# Patient Record
Sex: FEMALE | Race: WHITE | NOT HISPANIC OR LATINO | Employment: OTHER | URBAN - METROPOLITAN AREA
[De-identification: names, ages, dates, MRNs, and addresses within clinical notes are randomized per-mention and may not be internally consistent; named-entity substitution may affect disease eponyms.]

---

## 2017-01-05 ENCOUNTER — LAB VISIT (OUTPATIENT)
Dept: LAB | Facility: HOSPITAL | Age: 67
End: 2017-01-05
Attending: NURSE PRACTITIONER
Payer: COMMERCIAL

## 2017-01-05 ENCOUNTER — TELEPHONE (OUTPATIENT)
Dept: INTERNAL MEDICINE | Facility: CLINIC | Age: 67
End: 2017-01-05

## 2017-01-05 LAB
25(OH)D3+25(OH)D2 SERPL-MCNC: 27 NG/ML
ALBUMIN SERPL BCP-MCNC: 3.2 G/DL
ALP SERPL-CCNC: 175 U/L
ALT SERPL W/O P-5'-P-CCNC: 17 U/L
ANION GAP SERPL CALC-SCNC: 14 MMOL/L
AST SERPL-CCNC: 13 U/L
BILIRUB SERPL-MCNC: 0.3 MG/DL
BUN SERPL-MCNC: 37 MG/DL
CALCIUM SERPL-MCNC: 9 MG/DL
CHLORIDE SERPL-SCNC: 107 MMOL/L
CO2 SERPL-SCNC: 15 MMOL/L
CREAT SERPL-MCNC: 2 MG/DL
EST. GFR  (AFRICAN AMERICAN): 29.8 ML/MIN/1.73 M^2
EST. GFR  (NON AFRICAN AMERICAN): 25.8 ML/MIN/1.73 M^2
ESTIMATED AVG GLUCOSE: 148 MG/DL
GLUCOSE SERPL-MCNC: 205 MG/DL
HBA1C MFR BLD HPLC: 6.8 %
POTASSIUM SERPL-SCNC: 4.1 MMOL/L
PROT SERPL-MCNC: 7.1 G/DL
SODIUM SERPL-SCNC: 136 MMOL/L
T4 FREE SERPL-MCNC: 0.98 NG/DL
TSH SERPL DL<=0.005 MIU/L-ACNC: 0.22 UIU/ML

## 2017-01-05 PROCEDURE — 36415 COLL VENOUS BLD VENIPUNCTURE: CPT

## 2017-01-05 PROCEDURE — 84439 ASSAY OF FREE THYROXINE: CPT

## 2017-01-05 PROCEDURE — 83036 HEMOGLOBIN GLYCOSYLATED A1C: CPT

## 2017-01-05 PROCEDURE — 84443 ASSAY THYROID STIM HORMONE: CPT

## 2017-01-05 PROCEDURE — 80053 COMPREHEN METABOLIC PANEL: CPT

## 2017-01-05 PROCEDURE — 82306 VITAMIN D 25 HYDROXY: CPT

## 2017-01-05 NOTE — TELEPHONE ENCOUNTER
----- Message from Lurdes Navarro MD sent at 1/2/2017  7:13 PM CST -----  Please note that your bone density revealed osteopenia, thinning of the bone,  But not to the degree that prescriptive medication is recommended  Calcium, vitamin D and exercise are recommended, but the calcium and vitamin D levels are best recommended by your  Nephrologist  Lurdes Cancino

## 2017-01-09 ENCOUNTER — OFFICE VISIT (OUTPATIENT)
Dept: ENDOCRINOLOGY | Facility: CLINIC | Age: 67
End: 2017-01-09
Payer: COMMERCIAL

## 2017-01-09 VITALS
BODY MASS INDEX: 44.41 KG/M2 | SYSTOLIC BLOOD PRESSURE: 120 MMHG | WEIGHT: 293 LBS | HEART RATE: 62 BPM | DIASTOLIC BLOOD PRESSURE: 90 MMHG | HEIGHT: 68 IN

## 2017-01-09 DIAGNOSIS — M85.80 OSTEOPENIA: ICD-10-CM

## 2017-01-09 DIAGNOSIS — N18.4 CKD (CHRONIC KIDNEY DISEASE), STAGE IV: ICD-10-CM

## 2017-01-09 DIAGNOSIS — E03.9 ACQUIRED HYPOTHYROIDISM: ICD-10-CM

## 2017-01-09 DIAGNOSIS — E55.9 VITAMIN D DEFICIENCY DISEASE: ICD-10-CM

## 2017-01-09 DIAGNOSIS — E78.5 HYPERLIPIDEMIA, UNSPECIFIED HYPERLIPIDEMIA TYPE: ICD-10-CM

## 2017-01-09 DIAGNOSIS — E66.01 MORBID OBESITY DUE TO EXCESS CALORIES: ICD-10-CM

## 2017-01-09 PROCEDURE — 3080F DIAST BP >= 90 MM HG: CPT | Mod: S$GLB,,, | Performed by: NURSE PRACTITIONER

## 2017-01-09 PROCEDURE — 99999 PR PBB SHADOW E&M-EST. PATIENT-LVL III: CPT | Mod: PBBFAC,,, | Performed by: NURSE PRACTITIONER

## 2017-01-09 PROCEDURE — 3074F SYST BP LT 130 MM HG: CPT | Mod: S$GLB,,, | Performed by: NURSE PRACTITIONER

## 2017-01-09 PROCEDURE — 3066F NEPHROPATHY DOC TX: CPT | Mod: S$GLB,,, | Performed by: NURSE PRACTITIONER

## 2017-01-09 PROCEDURE — 99214 OFFICE O/P EST MOD 30 MIN: CPT | Mod: S$GLB,,, | Performed by: NURSE PRACTITIONER

## 2017-01-09 PROCEDURE — 3044F HG A1C LEVEL LT 7.0%: CPT | Mod: S$GLB,,, | Performed by: NURSE PRACTITIONER

## 2017-01-09 PROCEDURE — 1159F MED LIST DOCD IN RCRD: CPT | Mod: S$GLB,,, | Performed by: NURSE PRACTITIONER

## 2017-01-09 RX ORDER — INSULIN GLARGINE 100 [IU]/ML
INJECTION, SOLUTION SUBCUTANEOUS
Qty: 15 SYRINGE | Refills: 3
Start: 2017-01-09 | End: 2017-01-10 | Stop reason: SDUPTHER

## 2017-01-09 RX ORDER — INSULIN GLARGINE 100 [IU]/ML
INJECTION, SOLUTION SUBCUTANEOUS
Qty: 15 SYRINGE | Refills: 3 | Status: CANCELLED | OUTPATIENT
Start: 2017-01-09

## 2017-01-09 RX ORDER — INSULIN LISPRO 100 [IU]/ML
INJECTION, SOLUTION INTRAVENOUS; SUBCUTANEOUS
Qty: 10 ML | Refills: 11
Start: 2017-01-09 | End: 2017-07-26

## 2017-01-09 RX ORDER — METHOCARBAMOL 750 MG/1
TABLET, FILM COATED ORAL
Qty: 180 TABLET | Refills: 6 | Status: SHIPPED | OUTPATIENT
Start: 2017-01-09 | End: 2017-05-29 | Stop reason: SDUPTHER

## 2017-01-09 NOTE — PROGRESS NOTES
CC: This 66 y.o. female presents for management of diabetes mellitus     HPI: She was diagnosed with T2DM in 2006, found on routine bloodwork screening given family hx of DM. Previously tried Actos but not effective.   + hypo after New Year- adjusted diet some; Her s/s include legs get tingling, weak, and shaky     CURRENT DM MEDS: Lantus Solostar 20 units PM, Humalog vials 7-10-11 + correction    DIET/ MEAL PATTERN: Eat 3 meals a day, Breakfast- 2 yogurts or honey bun; Lunch- 3 Taquito's or PB sandwich or can of soup; Dinner- home cooked or take out. Largest meal- eats out about 4 times a week- order out- pizza, pasta, chinese, burgers  EXERCISE: no formal exercise, limited r/t back pain.    STANDARDS OF CARE:  Eye exam:  12/2016. No history of retinopathy. Sees Dr Kearns     C/o back pain and shoulder pain- excited A1C down so she can get a steroid injection in her rotator cuff  BMD 12/16- + osteopenia                           ROS:   Gen: good appetite, generalized fatigue and weakness.   Skin: Skin is intact, no rashes .  Eyes: Denies visual disturbances  Resp:   no SOB or WOLFE, no cough  Cardiac: No palpitations, chest pain, denies syncope, weakness, no edema or cyanosis.  GI: No nausea or vomiting, + diarrhea off/on- relates to dairy; no constipation   /GYN: has suprapubic catheter placed.   PVD: c/o chronic back pain rates 6-9 out of 10 on most days .  MS/Neuro: Denies numbness/ tingling in BLE; Gait steady - ambulated with cane .  Psych: Denies drug/ETOH abuse, no hx. of eating disorders   Other systems: negative.    PE:  GENERAL: Well developed, well nourished.  PSYCH: AAOx3, appropriate mood and affect, pleasant expression, conversant, appears relaxed, well groomed.   EYES: Conjunctiva, corneas clear, no lid lag, EOM intact.  NECK: Supple, trachea midline, carotid pulses strong   CHEST: Resp even and unlabored.  ABDOMEN: Soft, non-tender, non-distended; +BSs x4  VASCULAR:  1+ edema BLE, brisk capillary  refill BUE.  NEURO: walks with cane  SKIN: Normal skin turgor. Skin warm and dry.  no acanthosis nigracans.  Feet- footwear appropriate       Hemoglobin A1C   Date Value Ref Range Status   01/05/2017 6.8 (H) 4.5 - 6.2 % Final     Comment:     According to ADA guidelines, hemoglobin A1C <7.0% represents  optimal control in non-pregnant diabetic patients.  Different  metrics may apply to specific populations.   Standards of Medical Care in Diabetes - 2016.  For the purpose of screening for the presence of diabetes:  <5.7%     Consistent with the absence of diabetes  5.7-6.4%  Consistent with increasing risk for diabetes   (prediabetes)  >or=6.5%  Consistent with diabetes  Currently no consensus exists for use of hemoglobin A1C  for diagnosis of diabetes for children.     09/15/2016 7.2 (H) 4.5 - 6.2 % Final     Comment:     According to ADA guidelines, hemoglobin A1C <7.0% represents  optimal control in non-pregnant diabetic patients.  Different  metrics may apply to specific populations.   Standards of Medical Care in Diabetes - 2016.  For the purpose of screening for the presence of diabetes:  <5.7%     Consistent with the absence of diabetes  5.7-6.4%  Consistent with increasing risk for diabetes   (prediabetes)  >or=6.5%  Consistent with diabetes  Currently no consensus exists for use of hemoglobin A1C  for diagnosis of diabetes for children.     06/16/2016 7.2 (H) 4.5 - 6.2 % Final     Lab Results   Component Value Date    TSH 0.218 (L) 01/05/2017       ASSESSMENT and PLAN:    1. Diabetes type 2,  w ckd 4  -   continue Lantus Solostar 20 units PM, Change Humalog vials to 7-10-12 + correction.     Instructed pt to check BG at bedtime occassionaly   log in 2 weeks or sooner for issues  Notify me if have rotator cuff steroid injection- likely to effect her bg  - avoid metformin r/t CKD  - avoid Invokana r/t recurrent UTI  - Takes ASA, statin    2. CKD stage 4 -  avoid hypoglycemia; caution with insulin stacking;  following w Dr López in [place]    3. Hypertension- controlled, off meds per PCP    4. Hyperlipidemia -  LDL at goal < 100 - taking pravastatin, takes lopid every other day; LFT's at goal, recheck w RTC    5. Hypothyroidism -  I decreased to 100 mcg LT4 approx 1-2 weeks ago; Recheck TSH in 4 weeks and w RTC- add to lab on 2/15    Lab Results   Component Value Date    TSH 0.218 (L) 01/05/2017       6. Fibromyalgia - followed by rheumatology      7. Djd (degenerative joint disease) - limits mobility impacting BG; pain may elevate BG readings    8. Vitamin d deficiency disease/osteopenia - on supplement 1 pill in the AM (can't recall IU)- needs to let me know via patient portal; viatmin d 27- not at goal; aware dose needs to be adjusted    9. Depression - stable on Effexor, elavil    10.Morbid Obesity - discussed DM diet, portion control- Body mass index is 45.31 kg/(m^2).

## 2017-01-09 NOTE — MR AVS SNAPSHOT
[facility] - Endocrinology  [address] [address]  Phone: [phone]                  Cecile Bowen   2017 11:00 AM   Office Visit    Description:  Female : 1950   Provider:  Sumaya Dwyer DNP, NP   Department:  [facility] - Endocrinology           Reason for Visit     Diabetes Mellitus           Diagnoses this Visit        Comments    Uncontrolled type 2 diabetes mellitus with stage 4 chronic kidney disease, with long-term current use of insulin    -  Primary            To Do List           Future Appointments        Provider Department Dept Phone    2017 7:10 AM LAB, APPOINTMENT [place] [facility] [phone]    2017 8:30 AM [facility] CT2 64- LIMIT 600 LBS [facility] [phone]    2017 11:00 AM Rani Díaz MD [place] - Hematology Oncology [phone]    2017 10:00 AM Saroj Case MD [facility] - Neurology [phone]    2017 2:20 PM Thor Garvey MD [facility] - Rheumatology [phone]      Your Future Surgeries/Procedures     2017   Surgery with Morris Wiseman MD   [facility]    [address] [address] [phone]              Goals (5 Years of Data)     None       These Medications        Disp Refills Start End    insulin lispro (HUMALOG) 100 unit/mL injection 10 mL 11 2017     7-10-12 + correction    Pharmacy: [facility]/pharmacy # [facility] [address]. Ph #: [phone]       insulin glargine (LANTUS SOLOSTAR) 100 unit/mL (3 mL) InPn pen 15 Syringe 3 2017     INJECT 20 UNITS INTO THE SKIN EVERY am    Pharmacy: [facility]/pharmacy # - [place] - [address]. Ph #: [phone]         [facility]     [facility] -  Assistance  Registered nurses in the [facility] provide clinical advisement, health education, appointment booking,  "and other advisory services.  Call for this free service at [phone].             Medications           Message regarding Medications     Verify the changes and/or additions to your medication regime listed below are the same as discussed with your clinician today.  If any of these changes or additions are incorrect, please notify your healthcare provider.        CHANGE how you are taking these medications     Start Taking Instead of    insulin lispro (HUMALOG) 100 unit/mL injection HUMALOG 100 unit/mL injection    Dosage:  7-10-12 + correction Dosage:  INJECT UNDER THE SKIN 5 UNITS IN THE AM, 7 UNITS FOR LUNCH, AND 9 UNITS FOR DINNER BEFORE MEALS    Reason for Change:  Reorder     insulin glargine (LANTUS SOLOSTAR) 100 unit/mL (3 mL) InPn pen LANTUS SOLOSTAR 100 unit/mL (3 mL) InPn pen    Dosage:  INJECT 20 UNITS INTO THE SKIN EVERY am Dosage:  INJECT 19 UNITS INTO THE SKIN EVERY EVENING.    Reason for Change:  Reorder       STOP taking these medications     vitamin D (VITAMIN D3) 185 MG Tab Take 185 mg by mouth once daily.           Verify that the below list of medications is an accurate representation of the medications you are currently taking.  If none reported, the list may be blank. If incorrect, please contact your healthcare provider. Carry this list with you in case of emergency.           Current Medications     aspirin (ECOTRIN) 81 MG EC tablet Take 81 mg by mouth once daily.    BD INSULIN SYRINGE ULTRA-FINE 1/2 mL 31 gauge x 15/64" Syrg USE WITH INSULIN 4 TIMES A DAY    butalbital-acetaminophen-caffeine -40 mg (FIORICET, ESGIC) -40 mg per tablet TAKE 1 TABLET BY MOUTH DAILY AS NEEDED FOR PAIN.    cyanocobalamin, vitamin B-12, (VITAMIN B-12) 5,000 mcg Subl Place 1 tablet under the tongue once daily.    ferrous sulfate 325 (65 FE) MG EC tablet Take 325 mg by mouth once daily.     gemfibrozil (LOPID) 600 MG tablet TAKE 1 TABLET BY MOUTH TWICE A DAY    hydrocodone-acetaminophen 10-325mg " "(NORCO)  mg Tab Take 1 tablet by mouth every 6 (six) hours as needed.    insulin glargine (LANTUS SOLOSTAR) 100 unit/mL (3 mL) InPn pen INJECT 20 UNITS INTO THE SKIN EVERY am    insulin lispro (HUMALOG) 100 unit/mL injection 7-10-12 + correction    lancets (ONE TOUCH DELICA LANCETS) Misc Ultra 2 lancets to check blood sugar BID    levothyroxine (SYNTHROID) 100 MCG tablet Take 1 tablet (100 mcg total) by mouth once daily.    lidocaine (LIDODERM) 5 % APPLY 1 PATCH TO SKIN 12 HOURS ON 12 HOURS OFF    magnesium oxide (MAG-OX) 400 mg tablet TAKE 1 TABLET (400 MG TOTAL) BY MOUTH 2 (TWO) TIMES DAILY.    methocarbamol (ROBAXIN) 750 MG Tab TAKE 1-2 TABLETS BY MOUTH 4 TIMES DAILY AS NEEDED    multivitamin with minerals tablet Take 1 tablet by mouth once daily.    peg 3350-electrolytes-vit C (MOVIPREP) 100-7.5-2.691 gram PwPk Take 1 packet by mouth as directed.    pravastatin (PRAVACHOL) 40 MG tablet TAKE 1 TABLET BY MOUTH EVERY DAY    pyridoxine (B-6) 100 MG Tab Take 1 tablet (100 mg total) by mouth once daily.    scopolamine (TRANSDERM-SCOP) 1.5 mg (1 mg over 3 days) Place 1 patch (1.5 mg total) onto the skin every 72 hours.    sodium bicarbonate 650 MG tablet Take 1 tablet (650 mg total) by mouth 3 (three) times daily.    sumatriptan (IMITREX) 100 MG tablet TAKE 1 TABLET BY MOUTH ONCE A DAY    topiramate (TOPAMAX) 100 MG tablet TAKE 1 TABLET (100 MG TOTAL) BY MOUTH 2 (TWO) TIMES DAILY.    trazodone (DESYREL) 100 MG tablet TAKE 1 TABLET BY MOUTH AT BEDTIME MAY TAKE AN EXTRA HALF TABLET IF NEEDED    venlafaxine (EFFEXOR-XR) 75 MG 24 hr capsule TAKE 2 CAPSULES BY MOUTH ONCE DAILY    oxybutynin (DITROPAN-XL) 10 MG 24 hr tablet Take 1 tablet (10 mg total) by mouth once daily.           Clinical Reference Information           Vital Signs - Last Recorded  Most recent update: 1/9/2017 11:20 AM by Haley Robles MA    BP Pulse Ht Wt BMI    (!) 120/90 62 5' 8" (1.727 m) 135.2 kg (298 lb) 45.31 kg/m2      Blood Pressure     "      Most Recent Value    BP  (!)  120/90      Allergies as of 1/9/2017     No Known Allergies      Immunizations Administered on Date of Encounter - 1/9/2017     None      Orders Placed During Today's Visit     Future Labs/Procedures Expected by Expires    TSH  1/9/2017 3/10/2018    Comprehensive metabolic panel  4/11/2017 1/9/2018    Hemoglobin A1c  4/11/2017 1/9/2018    TSH  4/11/2017 1/9/2018    Vitamin D  4/11/2017 1/9/2018

## 2017-01-10 RX ORDER — INSULIN GLARGINE 100 [IU]/ML
INJECTION, SOLUTION SUBCUTANEOUS
Qty: 15 SYRINGE | Refills: 3 | Status: SHIPPED | OUTPATIENT
Start: 2017-01-10 | End: 2017-12-11 | Stop reason: SDUPTHER

## 2017-01-12 ENCOUNTER — PATIENT MESSAGE (OUTPATIENT)
Dept: ENDOCRINOLOGY | Facility: CLINIC | Age: 67
End: 2017-01-12

## 2017-01-13 ENCOUNTER — ANESTHESIA EVENT (OUTPATIENT)
Dept: ENDOSCOPY | Facility: HOSPITAL | Age: 67
End: 2017-01-13
Payer: COMMERCIAL

## 2017-01-13 ENCOUNTER — ANESTHESIA (OUTPATIENT)
Dept: ENDOSCOPY | Facility: HOSPITAL | Age: 67
End: 2017-01-13
Payer: COMMERCIAL

## 2017-01-13 ENCOUNTER — SURGERY (OUTPATIENT)
Age: 67
End: 2017-01-13

## 2017-01-13 VITALS — RESPIRATION RATE: 41 BRPM

## 2017-01-13 PROBLEM — Z12.11 SPECIAL SCREENING FOR MALIGNANT NEOPLASMS, COLON: Status: ACTIVE | Noted: 2017-01-13

## 2017-01-13 PROCEDURE — D9220A PRA ANESTHESIA: Mod: 33,CRNA,, | Performed by: NURSE ANESTHETIST, CERTIFIED REGISTERED

## 2017-01-13 PROCEDURE — 63600175 PHARM REV CODE 636 W HCPCS: Performed by: NURSE ANESTHETIST, CERTIFIED REGISTERED

## 2017-01-13 PROCEDURE — 25000003 PHARM REV CODE 250: Performed by: NURSE ANESTHETIST, CERTIFIED REGISTERED

## 2017-01-13 PROCEDURE — D9220A PRA ANESTHESIA: Mod: 33,ANES,, | Performed by: ANESTHESIOLOGY

## 2017-01-13 PROCEDURE — 25000003 PHARM REV CODE 250: Performed by: COLON & RECTAL SURGERY

## 2017-01-13 RX ORDER — PROPOFOL 10 MG/ML
VIAL (ML) INTRAVENOUS CONTINUOUS PRN
Status: DISCONTINUED | OUTPATIENT
Start: 2017-01-13 | End: 2017-01-13

## 2017-01-13 RX ORDER — PROPOFOL 10 MG/ML
VIAL (ML) INTRAVENOUS
Status: DISCONTINUED | OUTPATIENT
Start: 2017-01-13 | End: 2017-01-13

## 2017-01-13 RX ORDER — LIDOCAINE HCL/PF 100 MG/5ML
SYRINGE (ML) INTRAVENOUS
Status: DISCONTINUED | OUTPATIENT
Start: 2017-01-13 | End: 2017-01-13

## 2017-01-13 RX ADMIN — PROPOFOL 80 MG: 10 INJECTION, EMULSION INTRAVENOUS at 11:01

## 2017-01-13 RX ADMIN — LIDOCAINE HYDROCHLORIDE 50 MG: 20 INJECTION, SOLUTION INTRAVENOUS at 11:01

## 2017-01-13 RX ADMIN — DEXTROSE MONOHYDRATE AND SODIUM CHLORIDE: 5; .45 INJECTION, SOLUTION INTRAVENOUS at 11:01

## 2017-01-13 RX ADMIN — PROPOFOL 150 MCG/KG/MIN: 10 INJECTION, EMULSION INTRAVENOUS at 11:01

## 2017-01-13 NOTE — ANESTHESIA PREPROCEDURE EVALUATION
01/13/2017  Cecile Bowen is a 66 y.o., female.    Active Ambulatory Problems     Diagnosis Date Noted    Hypertension 12/12/2012    Hypothyroidism 12/12/2012    Fibromyalgia 12/17/2012    Chronic rhinitis 03/23/2013    Intractable chronic migraine without aura 08/27/2013    Vitamin D deficiency disease 03/11/2014    Hyperlipidemia 03/11/2014    VIJAYA (obstructive sleep apnea) 03/17/2014    Nuclear sclerosis - Both Eyes 03/24/2014    Dizziness and giddiness 09/22/2014    Falls frequently 09/30/2014    Abnormality of gait and mobility 10/29/2014    Mobility impaired 10/29/2014    CKD (chronic kidney disease), stage IV 12/29/2014    Osteoarthritis of lumbar spine 02/20/2015    Recurrent UTI 02/24/2015    Enlarged lymph node 03/12/2015    Anemia 03/12/2015    Iron deficiency anemia 03/16/2015    Primary osteoarthritis involving multiple joints 09/15/2015    Encounter for care or replacement of suprapubic tube 10/06/2015    Urinary retention 11/10/2015    Metabolic acidosis 12/07/2015    Neurogenic bladder 12/14/2015    Major depressive disorder, recurrent episode, moderate 03/10/2016    Insomnia 03/10/2016    Mixed migraine and muscle contraction headache 03/10/2016    Secondary hyperparathyroidism, renal 03/22/2016    Uncontrolled type 2 diabetes mellitus with chronic kidney disease, with long-term current use of insulin 07/06/2016    Chronic suprapubic catheter 09/19/2016    Pulmonary nodule 11/10/2016    Osteopenia 01/09/2017    Morbid obesity due to excess calories 01/09/2017     Resolved Ambulatory Problems     Diagnosis Date Noted    Hypoglycemic reaction to insulin in type 2 diabetes mellitus 12/12/2012    DJD (degenerative joint disease) 12/12/2012    Pyelonephritis 05/28/2013    Long term current use of opiate analgesic 10/25/2013    Falls frequently 09/21/2014     Dizziness 09/21/2014    Hyponatremia 09/22/2014    UTI (urinary tract infection) 09/22/2014    Dehydration 09/22/2014    Acute kidney injury 09/23/2014    Hyperosmolar non-ketotic state in patient with type 2 diabetes mellitus 09/30/2014    Nausea and vomiting 09/30/2014    Candiduria 09/30/2014    Scalp contusion 09/30/2014    Anemia 11/25/2014    Obesity 02/20/2015    Urinary frequency 02/24/2015    DM type 2 (diabetes mellitus, type 2) 04/27/2015    Secondary hyperparathyroidism (of renal origin) 04/27/2015    Long-term current use of opiate analgesic 09/15/2015    Uncontrolled type 2 diabetes mellitus with diabetic neuropathy, without long-term current use of insulin 09/19/2016    Type 2 diabetes mellitus with proteinuria 09/19/2016    Morbid obesity 10/19/2016     Past Medical History   Diagnosis Date    CKD (chronic kidney disease) stage 3, GFR 30-59 ml/min     Diabetes type 2, uncontrolled 12/12/2012    Migraine          [facility] Anesthesia Evaluation    I have reviewed the Patient Summary Reports.    I have reviewed the Nursing Notes.      Review of Systems  Anesthesia Hx:  Denies Hx of Anesthetic complications    Cardiovascular:   Hypertension  Denies Angina.        Pulmonary:   Denies Shortness of breath. Sleep Apnea    Renal/:   Chronic Renal Disease, CRI    Hepatic/GI:   Bowel Prep. Denies GERD. Denies Liver Disease.    Musculoskeletal:   Arthritis     Neurological:   Denies CVA. Headaches Denies Seizures.    Endocrine:   Diabetes, type 2, using insulin Hypothyroidism    Psych:   depression          Physical Exam  General:  Well nourished    Airway/Jaw/Neck:  Airway Findings: Mallampati: III Improves to II with phonation.  TM Distance: Normal, at least 6 cm  Jaw/Neck Findings:  Neck ROM: Normal ROM       Chest/Lungs:  Chest/Lungs Findings: Normal Respiratory Rate     Heart/Vascular:  Heart Findings: Rate: Normal        Mental Status:  Mental Status Findings:  Cooperative          Anesthesia Plan  Type of Anesthesia, risks & benefits discussed:  Anesthesia Type:  general  Patient's Preference: GA  Intra-op Monitoring Plan: standard ASA monitors  Intra-op Monitoring Plan Comments:   Post Op Pain Control Plan:   Post Op Pain Control Plan Comments: Opioids PRN  Induction:    Beta Blocker:  Patient is not currently on a Beta-Blocker (No further documentation required).       Informed Consent: Patient understands risks and agrees with Anesthesia plan.  Questions answered. Anesthesia consent signed with patient.  ASA Score: 3     Day of Surgery Review of History & Physical:        Anesthesia Plan Notes: I personally saw the patient and a history and physical was performed.    Plan for GA - TIVA        Ready For Surgery From Anesthesia Perspective.

## 2017-01-13 NOTE — TRANSFER OF CARE
"Anesthesia Transfer of Care Note    Patient: Cecile Bowen    Procedure(s) Performed: Procedure(s) (LRB):  COLONOSCOPY (N/A)    Patient location: PACU    Anesthesia Type: general    Transport from OR: Transported from OR on 2-3 L/min O2 by NC with adequate spontaneous ventilation    Post pain: adequate analgesia    Post assessment: no apparent anesthetic complications    Post vital signs: stable    Level of consciousness: awake, alert and oriented    Nausea/Vomiting: no nausea/vomiting    Complications: none          Last vitals:   Visit Vitals    BP (!) 146/70 (BP Location: Right arm, Patient Position: Lying, BP Method: Automatic)    Pulse 96    Temp 37.2 °C (99 °F) (Oral)    Resp 18    Ht 5' 8" (1.727 m)    Wt 134.7 kg (297 lb)    SpO2 96%    Breastfeeding No    BMI 45.16 kg/m2     "

## 2017-01-13 NOTE — ANESTHESIA POSTPROCEDURE EVALUATION
"Anesthesia Post Evaluation    Patient: Cecile Bowen    Procedure(s) Performed: Procedure(s) (LRB):  COLONOSCOPY (N/A)    Final Anesthesia Type: general  Patient location during evaluation: GI PACU  Patient participation: Yes- Able to Participate  Level of consciousness: awake and alert  Post-procedure vital signs: reviewed and stable  Pain management: adequate  Airway patency: patent  PONV status at discharge: No PONV  Anesthetic complications: no      Cardiovascular status: blood pressure returned to baseline  Respiratory status: unassisted, spontaneous ventilation and room air  Hydration status: euvolemic  Follow-up not needed.  Comments: Late entry        Visit Vitals    BP (!) 154/77 (BP Location: Right arm, Patient Position: Sitting, BP Method: Automatic)    Pulse 84    Temp 36.4 °C (97.5 °F) (Oral)    Resp 18    Ht 5' 8" (1.727 m)    Wt 134.7 kg (297 lb)    SpO2 (!) 94%    Breastfeeding No    BMI 45.16 kg/m2       Pain/Audrey Score: Pain Assessment Performed: Yes (1/13/2017 12:29 PM)  Presence of Pain: denies (1/13/2017 12:29 PM)  Audrey Score: 10 (1/13/2017 12:29 PM)      "

## 2017-01-16 ENCOUNTER — TELEPHONE (OUTPATIENT)
Dept: ENDOCRINOLOGY | Facility: HOSPITAL | Age: 67
End: 2017-01-16

## 2017-01-16 ENCOUNTER — TELEPHONE (OUTPATIENT)
Dept: ADMINISTRATIVE | Facility: OTHER | Age: 67
End: 2017-01-16

## 2017-01-16 DIAGNOSIS — E55.9 VITAMIN D DEFICIENCY DISEASE: Primary | ICD-10-CM

## 2017-01-16 RX ORDER — BUTALBITAL, ACETAMINOPHEN AND CAFFEINE 50; 325; 40 MG/1; MG/1; MG/1
TABLET ORAL
Qty: 30 TABLET | Refills: 0 | Status: SHIPPED | OUTPATIENT
Start: 2017-01-16 | End: 2017-03-03 | Stop reason: ALTCHOICE

## 2017-01-16 RX ORDER — ERGOCALCIFEROL 1.25 MG/1
50000 CAPSULE ORAL
Qty: 4 CAPSULE | Refills: 0 | Status: SHIPPED | OUTPATIENT
Start: 2017-01-16 | End: 2017-02-19 | Stop reason: SDUPTHER

## 2017-01-16 NOTE — TELEPHONE ENCOUNTER
----- Message from Jojo Person sent at 1/16/2017  9:30 AM CST -----  Contact: self  Pt is calling to reschedule her apt's that are for tomorrow. She states she cancelled one of them via the automated system but has a conflict with her schedule so she can not make it.    Contact number is [phone]

## 2017-01-20 ENCOUNTER — OFFICE VISIT (OUTPATIENT)
Dept: NEUROLOGY | Facility: CLINIC | Age: 67
End: 2017-01-20
Payer: COMMERCIAL

## 2017-01-20 ENCOUNTER — TELEPHONE (OUTPATIENT)
Dept: ENDOSCOPY | Facility: HOSPITAL | Age: 67
End: 2017-01-20

## 2017-01-20 VITALS — HEIGHT: 68 IN | SYSTOLIC BLOOD PRESSURE: 136 MMHG | DIASTOLIC BLOOD PRESSURE: 91 MMHG | HEART RATE: 95 BPM

## 2017-01-20 DIAGNOSIS — G44.40 MEDICATION OVERUSE HEADACHE: ICD-10-CM

## 2017-01-20 DIAGNOSIS — G43.711 INTRACTABLE CHRONIC MIGRAINE WITHOUT AURA AND WITH STATUS MIGRAINOSUS: ICD-10-CM

## 2017-01-20 DIAGNOSIS — G47.33 OSA (OBSTRUCTIVE SLEEP APNEA): Primary | ICD-10-CM

## 2017-01-20 DIAGNOSIS — R51.9 CHRONIC DAILY HEADACHE: ICD-10-CM

## 2017-01-20 PROCEDURE — 3080F DIAST BP >= 90 MM HG: CPT | Mod: S$GLB,,, | Performed by: PSYCHIATRY & NEUROLOGY

## 2017-01-20 PROCEDURE — 3075F SYST BP GE 130 - 139MM HG: CPT | Mod: S$GLB,,, | Performed by: PSYCHIATRY & NEUROLOGY

## 2017-01-20 PROCEDURE — 99999 PR PBB SHADOW E&M-EST. PATIENT-LVL V: CPT | Mod: PBBFAC,,, | Performed by: PSYCHIATRY & NEUROLOGY

## 2017-01-20 PROCEDURE — 99214 OFFICE O/P EST MOD 30 MIN: CPT | Mod: S$GLB,,, | Performed by: PSYCHIATRY & NEUROLOGY

## 2017-01-20 PROCEDURE — 1159F MED LIST DOCD IN RCRD: CPT | Mod: S$GLB,,, | Performed by: PSYCHIATRY & NEUROLOGY

## 2017-01-20 RX ORDER — MEMANTINE HYDROCHLORIDE 10 MG/1
10 TABLET ORAL 2 TIMES DAILY
Qty: 60 TABLET | Refills: 11 | Status: SHIPPED | OUTPATIENT
Start: 2017-01-20 | End: 2017-08-23

## 2017-01-20 RX ORDER — HYDROCODONE BITARTRATE AND ACETAMINOPHEN 10; 325 MG/1; MG/1
1 TABLET ORAL EVERY 6 HOURS PRN
Qty: 120 TABLET | Refills: 0 | Status: SHIPPED | OUTPATIENT
Start: 2017-01-20 | End: 2017-01-26 | Stop reason: SDUPTHER

## 2017-01-20 RX ORDER — SUMATRIPTAN SUCCINATE 100 MG/1
100 TABLET ORAL 2 TIMES DAILY PRN
Qty: 9 TABLET | Refills: 6 | Status: SHIPPED | OUTPATIENT
Start: 2017-01-20 | End: 2017-08-01 | Stop reason: SDUPTHER

## 2017-01-20 NOTE — PROGRESS NOTES
Follow up :   Migraine 4/week lasting several days   CDH   Fioricet up to 2 /day      Headache history:   Age of onset - Teens   Location - Bioccipital goes to crown   Nature of pain - Throbbing   Prodrome - no   Aura - No   Duration of headache - hrs   Time to peak intensity - 1 hr   Pain scale - range of intensity - 7-8/10   Frequency - 4/week , now 5/month   Status Migrainosus history - yes   Time of day of most headaches- anytime   Associated symptoms with the headache:   Meningeal symptoms - photophobia, phonophobia, exercise intolerance +   Nausea/vomitting +   Nasal drainage   Visual blurriness   Pallor/flushing   Dizziness +   Vertigo   Confusion   Impaired concentration +   Pain worsened with physical activity +   Neck pain +   Tension HA:   Location - Bifrontal   Nature of pain - Gripping   Prodrome - no   Aura - No   Duration of headache - hrs   Time to peak intensity - 1 hr   Pain scale - range of intensity - 6/10   Frequency - 4-5/week , now daily   Time of day of most headaches- morning   Associated symptoms with the headache: none   Headache Triggers: Heat (hot weather, hot baths or showers) , emotional stress +   Weather change +   Other comorbid conditions:   Anxiety - yes   Motion sickness symptom - yes   Treatment history:   Resolution of headache with sleep - yes   Meds tried:   Fioricet - helps   Imitrex - helps   She takes Vicodin 7.5 four times a day for pain as well as lidoderm patches, Daypro, Flexeril, Elavil, Effexor and Robaxin.   Elavil, Effexor - helps   topamax - helped with migraine     Headache risk factors:   H/o TBI - no   H/o Meningitis - no   F/h Aneurysms - no   Takes naps during the day   Denies refreshing sleep   Snoring +   Review of Systems   Constitutional: Negative.   Eyes: Negative.   Respiratory: Negative.   Cardiovascular: Negative.   Gastrointestinal: Negative.   Genitourinary: Negative.   Musculoskeletal: LBP +   Skin: Negative.   Neurological:Sleep disturbance + HA    Hematological: Negative.   Psychiatric/Behavioral: Negative.     Objective:    Physical Exam   Constitutional:   She appears well-developed and obese. She is well groomed   HENT:   Head: Atraumatic, oral and nasal mucosa intact   Mallampati - 3   Eyes: Conjunctivae and EOM are normal. Pupils are equal, round, and reactive to light OU   Neck: Neck supple. No thyromegaly present   Cardiovascular: Normal rate and normal heart sounds   No murmur heard   Pulmonary/Chest: Effort normal and breath sounds normal   Musculoskeletal: Normal range of motion. No joint stiffness. No vertebral point tenderness   Skin: Skin is warm and dry   Psychiatric: Normal mood and affect   Neuro exam:   Mental status:   Awake, attentive, Alert, oriented to self, place, year and month   Language function is intact   Naming, repetition and spontaneous meaningful speech expression are intact   Speech fluency is good and speech is clear   Remote and recent memory are good   No findings to suggest executive dysfuntion   Patient has adequate insight   Mood is stable   Cranial Nerves II - XII: intact   Coordination:   No intentional or positional tremor.   Motor:   Normal muscle bulk and symmetry. No fasciculations were noted   No pronator drift   Strength 5/5 shaheed   Sensory: Intact to light touch   Gait: Normal gait. Normal arm swing and turns. Uses cane   Headache Exam:   Bony prominence with tenderness over R parietal   Temples appear symmetric   No V1,V2,V3 distribution allodynia/hyperalgesia shaheed   No facial swelling   No gross TMJ abnormalities   No ptosis   No conj injection   No meningismus   No neck stiffness   No C spine tenderness   No tender points over trapezium   No supraorbital nerve exit point tenderness   No occipital nerve exit point tenderness   No auriculotemporal nerve tenderness   Assessment:       1.  HA (headache) - likely CO2 retention    2.  Obesity    3.  VIJAYA (obstructive sleep apnea)    4.  Occipital neuralgia - improved     5.  Chronic migraine without aura, with intractable migraine, so stated, without mention of status migrainosus + tension HA    6.  Skull lesion - stable for 15 yrs     7, TMJ   Poor medical compliance     Head CT FINDINGS:   The brain is normal in contour. No hydrocephalus. No hemorrhage, acute major vascular distribution infarct, or mass effect.   Small bony protuberance consistent with osteoma along the right frontal calvarium. Osseous structures are otherwise unremarkable. Visualized mastoid air cells and paranasal sinuses are well aerated.      Results for DANYELLE FARRIS (MRN [id number]) as of 10/25/2013 09:18     Ref. Range  8/27/2013 14:18    Thiamine  Latest Range:  ug/L  43    Vitamin B2  Latest Range: 1-19 mcg/L  11    Vitamin B6  Latest Range: 5-50 ug/L  2 (L)    Vit D, 25-Hydroxy  Latest Range: 30-96 ng/mL  43    TSH  Latest Range: 0.4-4.0 uIU/ml  2.158    T4, Total  Latest Range: 4.5-11.5 ug/dl  5.6       Plan:    1, prophylactic medication - Topamax 200 mg BID. Cont Effexor. Start Namenda 10 mg BID . Discussed side effects    2, Breakthrough headache - Imitrex , stop Fioricet    Multiple alternative treatment options were offered to the patient   3. Refrain from over counter pain medications. Discussed medication overuse headache. Cont Magnesium 400 mg PO BID    See  for medication overuse and depression   F/up sleep clinic     Pt a good candidate for BOTOX as she failed > 3 prophylactic agents     Patient verbalized understanding to plan. I answered all her questions to her satisfaction.

## 2017-01-20 NOTE — MR AVS SNAPSHOT
[facility] - Neurology  [address]  [facility] [address]  Phone: [phone]  Fax: [phone]                  Cecile Bowen   2017 10:00 AM   Office Visit    Description:  Female : 1950   Provider:  Saroj Case MD   Department:  [facility] - Neurology           Reason for Visit     Consult           Diagnoses this Visit        Comments    VIJAYA (obstructive sleep apnea)    -  Primary     Medication overuse headache         Intractable chronic migraine without aura and with status migrainosus         Chronic daily headache                To Do List           Future Appointments        Provider Department Dept Phone    2017 2:20 PM Thor Garvey MD [facility] - Rheumatology [phone]    2017 3:00 PM Tesha Ken NP [facility] - Urology 4th Floor [phone]    2017 12:00 PM LAB, APPOINTMENT [place] [facility] [phone]    2017 12:45 PM [facility] CT1 64- LIMIT 450 LBS [facility] [phone]    2017 2:30 PM Rani Díaz MD [place] - Hematology Oncology [phone]      Goals (5 Years of Data)     None      Follow-Up and Disposition     Return in about 2 months (around 3/20/2017).       These Medications        Disp Refills Start End    memantine (NAMENDA) 10 MG Tab 60 tablet 11 2017    Take 1 tablet (10 mg total) by mouth 2 (two) times daily. - Oral    Pharmacy: [id number] - [place], [place] - [address] Ph #: [phone]       sumatriptan (IMITREX) 100 MG tablet 9 tablet 6 2017     Take 1 tablet (100 mg total) by mouth 2 (two) times daily as needed for Migraine. - Oral    Pharmacy: [id number] - [place], [place] - [address] Ph #: [phone]         [facility]     [facility] -  Assistance  Registered nurses in the [facility] provide clinical advisement, health education, appointment booking, and other  "advisory services.  Call for this free service at [phone].             Medications           Message regarding Medications     Verify the changes and/or additions to your medication regime listed below are the same as discussed with your clinician today.  If any of these changes or additions are incorrect, please notify your healthcare provider.        START taking these NEW medications        Refills    memantine (NAMENDA) 10 MG Tab 11    Sig: Take 1 tablet (10 mg total) by mouth 2 (two) times daily.    Class: Normal    Route: Oral      CHANGE how you are taking these medications     Start Taking Instead of    sumatriptan (IMITREX) 100 MG tablet sumatriptan (IMITREX) 100 MG tablet    Dosage:  Take 1 tablet (100 mg total) by mouth 2 (two) times daily as needed for Migraine. Dosage:  TAKE 1 TABLET BY MOUTH ONCE A DAY    Reason for Change:  Reorder            Verify that the below list of medications is an accurate representation of the medications you are currently taking.  If none reported, the list may be blank. If incorrect, please contact your healthcare provider. Carry this list with you in case of emergency.           Current Medications     aspirin (ECOTRIN) 81 MG EC tablet Take 81 mg by mouth once daily.    BD INSULIN SYRINGE ULTRA-FINE 1/2 mL 31 gauge x 15/64" Syrg USE WITH INSULIN 4 TIMES A DAY    butalbital-acetaminophen-caffeine -40 mg (FIORICET, ESGIC) -40 mg per tablet TAKE 1 TABLET BY MOUTH DAILY AS NEEDED FOR PAIN.    cyanocobalamin, vitamin B-12, (VITAMIN B-12) 5,000 mcg Subl Place 1 tablet under the tongue once daily.    ergocalciferol (ERGOCALCIFEROL) 50,000 unit Cap Take 1 capsule (50,000 Units total) by mouth every 7 days.    ferrous sulfate 325 (65 FE) MG EC tablet Take 325 mg by mouth once daily.     gemfibrozil (LOPID) 600 MG tablet TAKE 1 TABLET BY MOUTH TWICE A DAY    hydrocodone-acetaminophen 10-325mg (NORCO)  mg Tab Take 1 tablet by mouth every 6 (six) hours as " "needed.    insulin lispro (HUMALOG) 100 unit/mL injection 7-10-12 + correction    lancets (ONE TOUCH DELICA LANCETS) Misc Ultra 2 lancets to check blood sugar BID    LANTUS SOLOSTAR 100 unit/mL (3 mL) InPn pen INJECT 19 UNITS INTO THE SKIN EVERY EVENING.    levothyroxine (SYNTHROID) 100 MCG tablet Take 1 tablet (100 mcg total) by mouth once daily.    lidocaine (LIDODERM) 5 % APPLY 1 PATCH TO SKIN 12 HOURS ON 12 HOURS OFF    magnesium oxide (MAG-OX) 400 mg tablet TAKE 1 TABLET (400 MG TOTAL) BY MOUTH 2 (TWO) TIMES DAILY.    methocarbamol (ROBAXIN) 750 MG Tab TAKE 1-2 TABLETS BY MOUTH 4 TIMES DAILY AS NEEDED    multivitamin with minerals tablet Take 1 tablet by mouth once daily.    peg 3350-electrolytes-vit C (MOVIPREP) 100-7.5-2.691 gram PwPk Take 1 packet by mouth as directed.    pravastatin (PRAVACHOL) 40 MG tablet TAKE 1 TABLET BY MOUTH EVERY DAY    pyridoxine (B-6) 100 MG Tab Take 1 tablet (100 mg total) by mouth once daily.    sodium bicarbonate 650 MG tablet Take 1 tablet (650 mg total) by mouth 3 (three) times daily.    sumatriptan (IMITREX) 100 MG tablet Take 1 tablet (100 mg total) by mouth 2 (two) times daily as needed for Migraine.    trazodone (DESYREL) 100 MG tablet TAKE 1 TABLET BY MOUTH AT BEDTIME MAY TAKE AN EXTRA HALF TABLET IF NEEDED    venlafaxine (EFFEXOR-XR) 75 MG 24 hr capsule TAKE 2 CAPSULES BY MOUTH ONCE DAILY    memantine (NAMENDA) 10 MG Tab Take 1 tablet (10 mg total) by mouth 2 (two) times daily.    oxybutynin (DITROPAN-XL) 10 MG 24 hr tablet Take 1 tablet (10 mg total) by mouth once daily.    scopolamine (TRANSDERM-SCOP) 1.5 mg (1 mg over 3 days) Place 1 patch (1.5 mg total) onto the skin every 72 hours.    topiramate (TOPAMAX) 100 MG tablet TAKE 1 TABLET (100 MG TOTAL) BY MOUTH 2 (TWO) TIMES DAILY.           Clinical Reference Information           Vital Signs - Last Recorded  Most recent update: 1/20/2017  9:49 AM by Petra Murillo MA    BP Pulse Ht             (!) 136/91 95 5' 8" " (1.727 m)         Blood Pressure          Most Recent Value    BP  (!)  136/91      Allergies as of 1/20/2017     No Known Allergies      Immunizations Administered on Date of Encounter - 1/20/2017     None      Orders Placed During Today's Visit      Normal Orders This Visit    Ambulatory consult to Psychiatry     Ambulatory consult to Sleep Disorders

## 2017-01-23 ENCOUNTER — PATIENT MESSAGE (OUTPATIENT)
Dept: NEPHROLOGY | Facility: CLINIC | Age: 67
End: 2017-01-23

## 2017-01-26 ENCOUNTER — OFFICE VISIT (OUTPATIENT)
Dept: RHEUMATOLOGY | Facility: CLINIC | Age: 67
End: 2017-01-26
Payer: COMMERCIAL

## 2017-01-26 VITALS
SYSTOLIC BLOOD PRESSURE: 130 MMHG | HEART RATE: 87 BPM | WEIGHT: 293 LBS | HEIGHT: 68 IN | BODY MASS INDEX: 44.41 KG/M2 | DIASTOLIC BLOOD PRESSURE: 84 MMHG

## 2017-01-26 DIAGNOSIS — M79.7 FIBROMYALGIA: ICD-10-CM

## 2017-01-26 DIAGNOSIS — Z79.891 LONG TERM PRESCRIPTION OPIATE USE: ICD-10-CM

## 2017-01-26 DIAGNOSIS — M15.9 PRIMARY OSTEOARTHRITIS INVOLVING MULTIPLE JOINTS: Primary | ICD-10-CM

## 2017-01-26 PROCEDURE — 99213 OFFICE O/P EST LOW 20 MIN: CPT | Mod: S$GLB,,, | Performed by: INTERNAL MEDICINE

## 2017-01-26 PROCEDURE — 3079F DIAST BP 80-89 MM HG: CPT | Mod: S$GLB,,, | Performed by: INTERNAL MEDICINE

## 2017-01-26 PROCEDURE — 99999 PR PBB SHADOW E&M-EST. PATIENT-LVL III: CPT | Mod: PBBFAC,,, | Performed by: INTERNAL MEDICINE

## 2017-01-26 PROCEDURE — 1159F MED LIST DOCD IN RCRD: CPT | Mod: S$GLB,,, | Performed by: INTERNAL MEDICINE

## 2017-01-26 PROCEDURE — 3075F SYST BP GE 130 - 139MM HG: CPT | Mod: S$GLB,,, | Performed by: INTERNAL MEDICINE

## 2017-01-26 RX ORDER — HYDROCODONE BITARTRATE AND ACETAMINOPHEN 10; 325 MG/1; MG/1
1 TABLET ORAL EVERY 6 HOURS PRN
Qty: 120 TABLET | Refills: 0 | Status: SHIPPED | OUTPATIENT
Start: 2017-03-17 | End: 2017-01-26 | Stop reason: SDUPTHER

## 2017-01-26 RX ORDER — HYDROCODONE BITARTRATE AND ACETAMINOPHEN 10; 325 MG/1; MG/1
1 TABLET ORAL EVERY 6 HOURS PRN
Qty: 120 TABLET | Refills: 0 | Status: SHIPPED | OUTPATIENT
Start: 2017-02-17 | End: 2017-01-26 | Stop reason: SDUPTHER

## 2017-01-26 RX ORDER — HYDROCODONE BITARTRATE AND ACETAMINOPHEN 10; 325 MG/1; MG/1
1 TABLET ORAL EVERY 6 HOURS PRN
Qty: 120 TABLET | Refills: 0 | Status: SHIPPED | OUTPATIENT
Start: 2017-04-14 | End: 2017-05-15 | Stop reason: SDUPTHER

## 2017-01-26 NOTE — PROGRESS NOTES
History of present illness: 66-year-old female had been following since prior to 2004. She has chronic pain secondary to osteoarthritis and fibromyalgia. She had been on anti-inflammatory medicines in the past but this was stopped 2 years ago when she developed renal insufficiency. She has been on long-term narcotic medications.  She remains on methocarbamol.  She is now using a scooter.  Her shoulder pain is stable.  She developed pain in the left anterior chest last week.  It is tender to touch.  She has had no shortness of breath.  She has been using heat and Biofreeze with some response.  Her other areas of pain have been stable.  Her diabetes has improved.  She denies other recent medical problems.    Physical examination:  Musculoskeletal: She has an area of point tenderness in the left midclavicular line in the area of the 10th rib.    Assessment:  1.  Stable osteoarthritis  2.  Muscular chest pain    Plans: Continue medications as before.  Prescriptions written for the next 3 months.  Return to see me in 4 months.

## 2017-01-30 ENCOUNTER — OFFICE VISIT (OUTPATIENT)
Dept: UROLOGY | Facility: CLINIC | Age: 67
End: 2017-01-30
Payer: COMMERCIAL

## 2017-01-30 VITALS
WEIGHT: 293 LBS | HEIGHT: 68 IN | HEART RATE: 86 BPM | BODY MASS INDEX: 44.41 KG/M2 | SYSTOLIC BLOOD PRESSURE: 161 MMHG | DIASTOLIC BLOOD PRESSURE: 90 MMHG

## 2017-01-30 DIAGNOSIS — L92.9 GRANULATION TISSUE: ICD-10-CM

## 2017-01-30 DIAGNOSIS — Z43.5 ENCOUNTER FOR SUPRAPUBIC CATHETER CARE: ICD-10-CM

## 2017-01-30 DIAGNOSIS — N31.9 NEUROGENIC BLADDER: Primary | ICD-10-CM

## 2017-01-30 PROCEDURE — 3080F DIAST BP >= 90 MM HG: CPT | Mod: S$GLB,,, | Performed by: NURSE PRACTITIONER

## 2017-01-30 PROCEDURE — 3077F SYST BP >= 140 MM HG: CPT | Mod: S$GLB,,, | Performed by: NURSE PRACTITIONER

## 2017-01-30 PROCEDURE — 99999 PR PBB SHADOW E&M-EST. PATIENT-LVL IV: CPT | Mod: PBBFAC,,, | Performed by: NURSE PRACTITIONER

## 2017-01-30 PROCEDURE — 51705 CHANGE OF BLADDER TUBE: CPT | Mod: S$GLB,,, | Performed by: NURSE PRACTITIONER

## 2017-01-30 PROCEDURE — 99213 OFFICE O/P EST LOW 20 MIN: CPT | Mod: 25,S$GLB,, | Performed by: NURSE PRACTITIONER

## 2017-01-30 PROCEDURE — 17250 CHEM CAUT OF GRANLTJ TISSUE: CPT | Mod: 51,S$GLB,, | Performed by: NURSE PRACTITIONER

## 2017-01-30 PROCEDURE — 1159F MED LIST DOCD IN RCRD: CPT | Mod: S$GLB,,, | Performed by: NURSE PRACTITIONER

## 2017-01-30 NOTE — PROGRESS NOTES
Subjective:       Patient ID: Cecile Bowen is a 66 y.o. female.    Chief Complaint: Catheterization (s/p tube change)    HPI Comments: Cecile Bowen is a 66 y.o. Female with history of bilateral hydronephrosis, incomplete bladder emptying which is managed by SPT (16fr).  Her last SPT change was 12/29/2016    Today she voices no complaints.  Here today for new SPT.  Good urine flow through SPT.      Last clinic visit with Dr. Whittington was 11/10/2015.      12/10/2015:  Procedure(s) Performed:   1. Cystoscopy with bladder botox injection  2. Silver nitrate to suprapubic catheter site  3 . Change suprapubic catheter tube            Past Medical History:    Diabetes type 2, uncontrolled                   12/12/2012    Obesity                                         12/12/2012    Hypertension                                    12/12/2012    DJD (degenerative joint disease)                12/12/2012    Hypothyroidism                                  12/12/2012    Nuclear sclerosis - Both Eyes                   3/24/2014     Migraine                                                      Past Surgical History:    TOTAL ABDOMINAL HYSTERECTOMY W/ BILATERAL SALP*  1985          GALLBLADDER SURGERY                              2006          TONSILLECTOMY, ADENOIDECTOMY                                   OVARIAN CYST SURGERY                             1985          HYSTERECTOMY                                                   DILATION AND CURETTAGE OF UTERUS                               Review of patient's family history indicates:    Diabetes                       Sister                    Kidney disease                 Sister                    ALS                            Mother                      Comment: d.    Cancer                         Maternal Grandmother        Comment: d. colon    Cancer                         Paternal Grandfather        Comment: d. lung    Diabetes                       Maternal Aunt     "         Kidney disease                 Maternal Aunt             Diabetes                       Maternal Uncle            Amblyopia                      Neg Hx                    Blindness                      Neg Hx                    Cataracts                      Neg Hx                    Glaucoma                       Neg Hx                    Macular degeneration           Neg Hx                    Retinal detachment             Neg Hx                    Strabismus                     Neg Hx                      Social History    Marital Status:             Spouse Name:                       Years of Education:                 Number of children:               Occupational History    None on file    Social History Main Topics    Smoking Status: Never Smoker                      Smokeless Status: Never Used                        Alcohol Use: No              Drug Use: No              Sexual Activity: Not Currently           Birth Control/Protection: Abstinence    Other Topics            Concern    None on file    Social History Narrative    Single      Lives w/ sister  And brother     both are helping her during her post hosp recovery period        Allergies:  Review of patient's allergies indicates no known allergies.    Medications:  Current outpatient prescriptions:amitriptyline (ELAVIL) 10 MG tablet, TAKE 3 TABLETS BY MOUTH IN THE EVENING, Disp: 90 tablet, Rfl: 5;  aspirin (ECOTRIN) 81 MG EC tablet, Take 81 mg by mouth once daily., Disp: , Rfl: ;  BD INSULIN PEN NEEDLE UF SHORT 31 X 5/16 " Ndle, USE ONCE DAILY WITH LANTUS SOLOSTAR PEN INSULIN, Disp: 100 each, Rfl: 11  butalbital-acetaminophen-caffeine -40 mg (FIORICET, ESGIC) -40 mg per tablet, TAKE 1 TABLET EVERY 4 TO 6 HOURS, Disp: 30 tablet, Rfl: 0;  cyanocobalamin, vitamin B-12, (VITAMIN B-12) 2,500 mcg Subl, Place 2,500 mcg under the tongue once daily., Disp: , Rfl: ;  diphenhydrAMINE (BENADRYL) 25 mg capsule, Take 25 mg by mouth " "every 6 (six) hours as needed., Disp: , Rfl:   ferrous sulfate 325 (65 FE) MG EC tablet, Take 325 mg by mouth 3 (three) times daily with meals., Disp: , Rfl: ;  fluticasone (FLONASE) 50 mcg/actuation nasal spray, 1 SPRAY IN EACH NOSTRIL DAILY (Patient taking differently: 1 SPRAY IN EACH NOSTRIL DAILY PRN), Disp: 16 g, Rfl: 1;  furosemide (LASIX) 20 MG tablet, Take 1 tablet (20 mg total) by mouth once daily., Disp: 4 tablet, Rfl: 0  gemfibrozil (LOPID) 600 MG tablet, TAKE 1 TABLET BY MOUTH TWICE A DAY, Disp: 60 tablet, Rfl: 5;  (START ON 4/29/2015) hydrocodone-acetaminophen 10-325mg (NORCO)  mg Tab, Take 1 tablet by mouth every 6 (six) hours as needed., Disp: 120 tablet, Rfl: 0;  insulin lispro (HUMALOG) 100 unit/mL injection, Inject 7 Units into the skin 3 (three) times daily before meals., Disp: 6.3 mL, Rfl: 11  insulin syringe-needle U-100 (BD INSULIN SYRINGE ULTRA-FINE) 1/2 mL 31 x 15/64" Syrg, 1 Box by Misc.(Non-Drug; Combo Route) route 4 (four) times daily., Disp: 100 Syringe, Rfl: 12;  lancets (ONE TOUCH DELICA LANCETS) Misc, Ultra 2 lancets to check blood sugar BID, Disp: 100 each, Rfl: 6;  LANTUS SOLOSTAR 100 unit/mL (3 mL) InPn pen, , Disp: , Rfl: 3  LIDODERM 5 %(700 mg/patch), APPLY 1 PATCH TO SKIN DAILY (LEAVING ON FOR 12 HOURS AND OFF FOR 12 HOURS), Disp: 30 patch, Rfl: 6;  magnesium oxide (MAG-OX) 400 mg tablet, TAKE 1 TABLET (400 MG TOTAL) BY MOUTH 2 (TWO) TIMES DAILY., Disp: 60 tablet, Rfl: 11;  methocarbamol (ROBAXIN) 750 MG Tab, TAKE 1 TO 2 TABLETS BY MOUTH 3 TIMES A DAY (Patient taking differently: Take 2 tablets twice daily), Disp: 120 tablet, Rfl: 3  methocarbamol (ROBAXIN) 750 MG Tab, TAKE 1 TO 2 TABLETS BY MOUTH 3 TIMES A DAY, Disp: 120 tablet, Rfl: 3;  methylPREDNISolone (MEDROL DOSEPACK) 4 mg tablet, use as directed, Disp: 21 tablet, Rfl: 0;  multivitamin with minerals tablet, Take 1 tablet by mouth once daily., Disp: , Rfl: ;  pravastatin (PRAVACHOL) 40 MG tablet, TAKE 1 TABLET BY " MOUTH EVERY DAY, Disp: 30 tablet, Rfl: 2  pyridoxine (B-6) 100 MG Tab, Take 1 tablet (100 mg total) by mouth once daily., Disp: 30 tablet, Rfl: 12;  scopolamine (TRANSDERM-SCOP) 1.5 mg, Place 1 patch (1.5 mg total) onto the skin every 72 hours., Disp: 4 patch, Rfl: 0;  sulfamethoxazole-trimethoprim 800-160mg (BACTRIM DS) 800-160 mg Tab, Take 1 tablet by mouth 2 (two) times daily., Disp: , Rfl: 0  sumatriptan (IMITREX) 100 MG tablet, TAKE 1 TABLET (100 MG TOTAL) BY MOUTH ONCE., Disp: 9 tablet, Rfl: 6;  SYNTHROID 125 mcg tablet, TAKE 1 TABLET BY MOUTH DAILY, Disp: 90 tablet, Rfl: 4;  topiramate (TOPAMAX) 100 MG tablet, TAKE 1 TABLET (100 MG TOTAL) BY MOUTH 2 (TWO) TIMES DAILY., Disp: 60 tablet, Rfl: 11;  topiramate (TOPAMAX) 25 MG tablet, Take 4 tablets (100 mg total) by mouth 2 (two) times daily., Disp: 240 tablet, Rfl: 5  venlafaxine (EFFEXOR-XR) 150 MG Cp24, TAKE 1 CAPSULE BY MOUTH ONCE DAILY, Disp: 30 capsule, Rfl: 2;  vitamin D (VITAMIN D3) 185 MG Tab, Take 185 mg by mouth once daily., Disp: , Rfl:         Review of Systems   Constitutional: Negative.  Negative for activity change, appetite change, chills and fever.   HENT: Negative for congestion, facial swelling, mouth sores, rhinorrhea, sore throat and trouble swallowing.    Eyes: Negative for photophobia, discharge and visual disturbance.   Respiratory: Negative for apnea, cough, chest tightness and wheezing.    Cardiovascular: Negative for chest pain and palpitations.   Gastrointestinal: Negative for abdominal pain, anal bleeding, constipation, diarrhea, nausea and vomiting.   Genitourinary: Negative for decreased urine volume, difficulty urinating, dysuria, flank pain, frequency, hematuria, nocturia, pelvic pain and urgency.        SPT draining well     Musculoskeletal: Positive for arthralgias and gait problem. Negative for back pain, neck pain and neck stiffness.        Scooter for mobility     Skin: Negative.  Negative for rash.   Neurological: Negative  for dizziness, seizures, speech difficulty, weakness and headaches.   Psychiatric/Behavioral: Negative for agitation, confusion, self-injury, sleep disturbance and suicidal ideas. The patient is not nervous/anxious.        Objective:      Physical Exam   Constitutional: She is oriented to person, place, and time. Vital signs are normal. She appears well-developed and well-nourished. She is active and cooperative.   HENT:   Head: Normocephalic.   Right Ear: External ear normal.   Left Ear: External ear normal.   Nose: Nose normal.   Eyes: Conjunctivae and lids are normal. Right eye exhibits no discharge. Left eye exhibits no discharge. No scleral icterus.   Neck: Trachea normal and normal range of motion. Neck supple. No tracheal deviation present.   Cardiovascular: Normal rate, regular rhythm and intact distal pulses.    Pulmonary/Chest: Effort normal. No respiratory distress.   Abdominal: Soft. Bowel sounds are normal. She exhibits no distension and no mass. There is no tenderness. There is no rebound and no guarding.       Musculoskeletal: Normal range of motion. She exhibits no edema.   Neurological: She is alert and oriented to person, place, and time.   Skin: Skin is warm, dry and intact.         Assessment:       1. Neurogenic bladder    2. Encounter for suprapubic catheter care        Plan:         I spent 20 minutes with the patient of which more than half was spent in direct consultation with the patient in regards to our treatment and plan.    Education and recommendations of today's plan of care including home remedies.  Removed 16fr SPT;   New 18fr SPT easily placed by me using sterile technique. (16fr was n/a)  I irrigated the bladder to verify the position  Balloon inflated with 8cc sterile water.   Silver Nitrate sticks x 3 to granulating tissue.  Tolerated well.  RTC one month

## 2017-02-05 RX ORDER — LANOLIN ALCOHOL/MO/W.PET/CERES
CREAM (GRAM) TOPICAL
Qty: 60 TABLET | Refills: 11 | Status: CANCELLED | OUTPATIENT
Start: 2017-02-05

## 2017-02-09 ENCOUNTER — OFFICE VISIT (OUTPATIENT)
Dept: HEMATOLOGY/ONCOLOGY | Facility: CLINIC | Age: 67
End: 2017-02-09
Payer: COMMERCIAL

## 2017-02-09 ENCOUNTER — HOSPITAL ENCOUNTER (OUTPATIENT)
Dept: RADIOLOGY | Facility: HOSPITAL | Age: 67
Discharge: HOME | End: 2017-02-09
Attending: INTERNAL MEDICINE
Payer: COMMERCIAL

## 2017-02-09 VITALS
RESPIRATION RATE: 20 BRPM | TEMPERATURE: 98 F | SYSTOLIC BLOOD PRESSURE: 131 MMHG | HEIGHT: 68 IN | DIASTOLIC BLOOD PRESSURE: 83 MMHG | HEART RATE: 86 BPM | OXYGEN SATURATION: 96 %

## 2017-02-09 DIAGNOSIS — D50.9 IRON DEFICIENCY ANEMIA, UNSPECIFIED IRON DEFICIENCY ANEMIA TYPE: ICD-10-CM

## 2017-02-09 DIAGNOSIS — R91.1 PULMONARY NODULE: ICD-10-CM

## 2017-02-09 DIAGNOSIS — R59.9 ENLARGED LYMPH NODE: Primary | ICD-10-CM

## 2017-02-09 PROCEDURE — 99999 PR PBB SHADOW E&M-EST. PATIENT-LVL III: CPT | Mod: PBBFAC,,, | Performed by: INTERNAL MEDICINE

## 2017-02-09 PROCEDURE — 71250 CT THORAX DX C-: CPT | Mod: 26,,, | Performed by: RADIOLOGY

## 2017-02-09 PROCEDURE — 3075F SYST BP GE 130 - 139MM HG: CPT | Mod: S$GLB,,, | Performed by: INTERNAL MEDICINE

## 2017-02-09 PROCEDURE — 99214 OFFICE O/P EST MOD 30 MIN: CPT | Mod: S$GLB,,, | Performed by: INTERNAL MEDICINE

## 2017-02-09 PROCEDURE — 71250 CT THORAX DX C-: CPT | Mod: TC

## 2017-02-09 PROCEDURE — 3079F DIAST BP 80-89 MM HG: CPT | Mod: S$GLB,,, | Performed by: INTERNAL MEDICINE

## 2017-02-09 NOTE — PROGRESS NOTES
Subjective:       Patient ID: Cecile Bowen is a 66 y.o. female.    Chief Complaint: Follow-up; Fatigue; and Headache    HPI     Returns for follow-up labs and CT scan.  CT 2/9/17:  1.  Redemonstration of a 0.5 cm pulmonary opacity within the superior segment of the lingula unchanged in size and appearance when compared to examination dated 10/14/2015. Continue surveillance per Fleischer society guidelines with 18-24 month followup (5/17-10/17) from initial discovery to ensure stability.  2. Scattered subcentimeter pulmonary nodules bilaterally, unchanged in size and appearance from prior examination dated 10/14/2015. No new lesions are identified.    Originally was seen after a CT scan was done on 3/2/15 to further evaluate cause of the complicated UTI and a lymph node was seen  Results:  Note this is a limited evaluation of the abdominal and pelvic contents without the use of intravenous and oral contrast. Imaged portions of the liver and spleen demonstrate homogenous attenuation. The gallbladder has been removed. The bile ducts, adrenal glands, and pancreas demonstrate no abnormality. The stomach, small bowel, and colon are unremarkable without evidence of obstruction or inflammation. There is no ascites or free air.  The kidneys are small in size, measuring approximately 8 cm in craniocaudad dimension. This likely reflects chronic medical renal disease. There is moderate bilateral hydroureteronephrosis without evidence of nephroureterolithiasis. There is no evidence of a pelvic or abdominal mass causing extrinsic compression of the ureters. Differential considerations for this hydroureteronephrosis include sequela of prior obstructive episodes versus reflux uropathy. The urinary bladder demonstrates no significant abnormality. Uterus is not identified.  There is a 1.6-cm para-aortic lymph node, nonspecific. Additional scattered prominent but not enlarged retroperitoneal lymph nodes are identified. The osseous  structures demonstrate no aggressive lesions or acute fractures. There is severe disk space narrowing at L2-3 with associated sclerosis and cystic change of the involved endplates. The extraperitoneal soft tissues demonstrate no significant abnormality.   She reported no unexpected weight loss- see below. No night sweats. No noticeable lymph node swelling.     Repeat scans in 10/16 revealed:  Impression 1.  Slightly decreased size of the left para-aortic node today measures 1.3-cm.  2.  4-mm nodule opacities in the right and left lower lobes unchanged.  There is a 6-mm opacity in the left upper lobe which I believe is more likely related to bronchiectasis and perhaps some mucus plugging.  Serial follow-up would seem reasonable. Per Fleischner Society guidelines; in a low risk patient, consider 12 month CT chest follow up. In a high risk patient  history of cancer/ smoker, consider 6 and 24 month CT chest follow up to exclude neoplasia.      We discussed LN smaller so no imaging indicated to follow  CT pulmonary nodule needed surveillance    At that time she was found to have an iron deficiency- replaced.    Review of Systems   Constitutional: Positive for fatigue (chronic). Negative for activity change, chills, fever and unexpected weight change.   HENT: Positive for sinus pressure (sinus allergies). Negative for sore throat and trouble swallowing.    Eyes: Negative for redness.   Respiratory: Negative for shortness of breath and wheezing.    Cardiovascular: Positive for leg swelling (reports chronic LLE). Negative for chest pain and palpitations.   Gastrointestinal: Negative for abdominal distention, abdominal pain, blood in stool, constipation, diarrhea, nausea and vomiting.        Colonoscopy prior- 2012- was due for follow-up 2/15   Genitourinary: Negative for decreased urine volume, difficulty urinating, hematuria (only with UTI) and urgency.        Recent urologic procedures     Musculoskeletal: Positive for  arthralgias (chronic), back pain and joint swelling. Negative for myalgias, neck pain and neck stiffness.        Sever arthritis- knees, hips, back (DJD and scoliosis)   Neurological: Positive for light-headedness (related to meds). Negative for dizziness, weakness, numbness and headaches (migraines, and other headaches sees neurology).       Objective:      Physical Exam   Constitutional: She is oriented to person, place, and time. She appears well-developed and well-nourished. No distress.   Presents alone   HENT:   Head: Normocephalic and atraumatic.   Right Ear: External ear normal.   Left Ear: External ear normal.   Nose: Nose normal.   Mouth/Throat: Oropharynx is clear and moist. No oropharyngeal exudate.   Eyes: Conjunctivae and EOM are normal. Pupils are equal, round, and reactive to light. Right eye exhibits no discharge. Left eye exhibits no discharge. No scleral icterus. Right pupil is round and reactive. Left pupil is round and reactive.   Neck: Normal range of motion. Neck supple. No JVD present. No tracheal deviation present. No thyromegaly present.   Cardiovascular: Normal rate, regular rhythm, normal heart sounds and intact distal pulses.  Exam reveals no gallop and no friction rub.    No murmur heard.  Pulmonary/Chest: Effort normal and breath sounds normal. No respiratory distress. She has no wheezes. She has no rales. She exhibits no tenderness.   Abdominal: Soft. Bowel sounds are normal. She exhibits no distension and no mass. There is no hepatosplenomegaly. There is no tenderness. There is no rebound and no guarding.   Musculoskeletal: She exhibits edema, tenderness and deformity.   Arthritis causes limitations, uses cane or wheelchair   Lymphadenopathy:        Head (right side): No submandibular adenopathy present.        Head (left side): No submandibular adenopathy present.     She has no cervical adenopathy.        Right cervical: No superficial cervical, no deep cervical and no posterior  cervical adenopathy present.       Left cervical: No superficial cervical, no deep cervical and no posterior cervical adenopathy present.     She has no axillary adenopathy.        Right axillary: No pectoral and no lateral adenopathy present.        Left axillary: No pectoral and no lateral adenopathy present.       Right: No inguinal and no supraclavicular adenopathy present.        Left: No inguinal and no supraclavicular adenopathy present.   Neurological: She is alert and oriented to person, place, and time. She has normal strength. No cranial nerve deficit or sensory deficit. She exhibits normal muscle tone. Coordination normal.   Skin: Skin is warm and dry. No bruising, no lesion, no petechiae and no rash noted. She is not diaphoretic. No erythema. No pallor.   Psychiatric: She has a normal mood and affect. Her behavior is normal. Judgment and thought content normal. Her mood appears not anxious. She does not exhibit a depressed mood.   Nursing note and vitals reviewed.      Assessment:       1. Enlarged lymph node    2. Iron deficiency anemia, unspecified iron deficiency anemia type    3. Pulmonary nodule        Plan:     1. Improved and no further follow-up recommended  2. Replaced  3. Stable CT - repeat in 18-24 months

## 2017-02-15 ENCOUNTER — TELEPHONE (OUTPATIENT)
Dept: ENDOCRINOLOGY | Facility: CLINIC | Age: 67
End: 2017-02-15

## 2017-02-15 ENCOUNTER — LAB VISIT (OUTPATIENT)
Dept: LAB | Facility: HOSPITAL | Age: 67
End: 2017-02-15
Payer: COMMERCIAL

## 2017-02-15 DIAGNOSIS — E03.9 ACQUIRED HYPOTHYROIDISM: Primary | ICD-10-CM

## 2017-02-15 DIAGNOSIS — N18.4 CKD (CHRONIC KIDNEY DISEASE), STAGE IV: ICD-10-CM

## 2017-02-15 LAB
ALBUMIN SERPL BCP-MCNC: 3.2 G/DL
ANION GAP SERPL CALC-SCNC: 11 MMOL/L
BASOPHILS # BLD AUTO: 0.03 K/UL
BASOPHILS NFR BLD: 0.3 %
BUN SERPL-MCNC: 36 MG/DL
CALCIUM SERPL-MCNC: 9 MG/DL
CHLORIDE SERPL-SCNC: 106 MMOL/L
CO2 SERPL-SCNC: 19 MMOL/L
CREAT SERPL-MCNC: 2.1 MG/DL
DIFFERENTIAL METHOD: NORMAL
EOSINOPHIL # BLD AUTO: 0.3 K/UL
EOSINOPHIL NFR BLD: 2.8 %
ERYTHROCYTE [DISTWIDTH] IN BLOOD BY AUTOMATED COUNT: 13.9 %
EST. GFR  (AFRICAN AMERICAN): 28.1 ML/MIN/1.73 M^2
EST. GFR  (NON AFRICAN AMERICAN): 24.3 ML/MIN/1.73 M^2
GLUCOSE SERPL-MCNC: 220 MG/DL
HCT VFR BLD AUTO: 41.3 %
HGB BLD-MCNC: 13.3 G/DL
LYMPHOCYTES # BLD AUTO: 2.4 K/UL
LYMPHOCYTES NFR BLD: 23.7 %
MCH RBC QN AUTO: 29.4 PG
MCHC RBC AUTO-ENTMCNC: 32.2 %
MCV RBC AUTO: 91 FL
MONOCYTES # BLD AUTO: 0.7 K/UL
MONOCYTES NFR BLD: 7.4 %
NEUTROPHILS # BLD AUTO: 6.6 K/UL
NEUTROPHILS NFR BLD: 65.5 %
PHOSPHATE SERPL-MCNC: 3.7 MG/DL
PLATELET # BLD AUTO: 346 K/UL
PMV BLD AUTO: 10.1 FL
POTASSIUM SERPL-SCNC: 4.2 MMOL/L
PTH-INTACT SERPL-MCNC: 151 PG/ML
RBC # BLD AUTO: 4.52 M/UL
SODIUM SERPL-SCNC: 136 MMOL/L
T4 FREE SERPL-MCNC: 1.29 NG/DL
TSH SERPL DL<=0.005 MIU/L-ACNC: 0.03 UIU/ML
WBC # BLD AUTO: 10 K/UL

## 2017-02-15 PROCEDURE — 80069 RENAL FUNCTION PANEL: CPT

## 2017-02-15 PROCEDURE — 83970 ASSAY OF PARATHORMONE: CPT

## 2017-02-15 PROCEDURE — 85025 COMPLETE CBC W/AUTO DIFF WBC: CPT

## 2017-02-15 PROCEDURE — 36415 COLL VENOUS BLD VENIPUNCTURE: CPT

## 2017-02-15 PROCEDURE — 84439 ASSAY OF FREE THYROXINE: CPT

## 2017-02-15 PROCEDURE — 84443 ASSAY THYROID STIM HORMONE: CPT

## 2017-02-15 RX ORDER — LEVOTHYROXINE SODIUM 75 UG/1
75 TABLET ORAL DAILY
Qty: 30 TABLET | Refills: 11 | Status: SHIPPED | OUTPATIENT
Start: 2017-02-15 | End: 2017-05-11

## 2017-02-15 NOTE — TELEPHONE ENCOUNTER
TSH result reviewed remains low    Please notify patient to  Decrease LT4 to 75 mcg qday and repeat tsh, ft4 in 6 weeks  Prescription sent to [facility] on [address]

## 2017-02-15 NOTE — TELEPHONE ENCOUNTER
Spoke to patient and let her know that CAROLE Yuliya would like her to Decrease LT4 to 75 mcg qday and repeat tsh, ft4 in 6 weeks  Prescription sent to [facility] on [address]. I also linked her labs to another apt she has in 6 weeks.

## 2017-02-20 RX ORDER — ERGOCALCIFEROL 1.25 MG/1
CAPSULE ORAL
Qty: 4 CAPSULE | Refills: 1 | Status: SHIPPED | OUTPATIENT
Start: 2017-02-20 | End: 2017-03-03

## 2017-03-03 ENCOUNTER — OFFICE VISIT (OUTPATIENT)
Dept: NEPHROLOGY | Facility: CLINIC | Age: 67
End: 2017-03-03
Payer: COMMERCIAL

## 2017-03-03 VITALS
TEMPERATURE: 98 F | OXYGEN SATURATION: 97 % | HEART RATE: 94 BPM | DIASTOLIC BLOOD PRESSURE: 76 MMHG | SYSTOLIC BLOOD PRESSURE: 122 MMHG

## 2017-03-03 DIAGNOSIS — N25.81 SECONDARY HYPERPARATHYROIDISM, RENAL: ICD-10-CM

## 2017-03-03 DIAGNOSIS — Z93.59 CHRONIC SUPRAPUBIC CATHETER: ICD-10-CM

## 2017-03-03 DIAGNOSIS — E87.20 METABOLIC ACIDOSIS: ICD-10-CM

## 2017-03-03 DIAGNOSIS — D50.9 IRON DEFICIENCY ANEMIA, UNSPECIFIED IRON DEFICIENCY ANEMIA TYPE: ICD-10-CM

## 2017-03-03 DIAGNOSIS — Z74.09 MOBILITY IMPAIRED: ICD-10-CM

## 2017-03-03 DIAGNOSIS — N18.4 CKD (CHRONIC KIDNEY DISEASE), STAGE IV: Primary | ICD-10-CM

## 2017-03-03 DIAGNOSIS — E66.01 MORBID OBESITY DUE TO EXCESS CALORIES: ICD-10-CM

## 2017-03-03 DIAGNOSIS — M79.7 FIBROMYALGIA: ICD-10-CM

## 2017-03-03 PROCEDURE — 3044F HG A1C LEVEL LT 7.0%: CPT | Mod: S$GLB,,, | Performed by: INTERNAL MEDICINE

## 2017-03-03 PROCEDURE — 3066F NEPHROPATHY DOC TX: CPT | Mod: S$GLB,,, | Performed by: INTERNAL MEDICINE

## 2017-03-03 PROCEDURE — 99999 PR PBB SHADOW E&M-EST. PATIENT-LVL II: CPT | Mod: PBBFAC,,, | Performed by: INTERNAL MEDICINE

## 2017-03-03 PROCEDURE — 3074F SYST BP LT 130 MM HG: CPT | Mod: S$GLB,,, | Performed by: INTERNAL MEDICINE

## 2017-03-03 PROCEDURE — 3078F DIAST BP <80 MM HG: CPT | Mod: S$GLB,,, | Performed by: INTERNAL MEDICINE

## 2017-03-03 PROCEDURE — 99214 OFFICE O/P EST MOD 30 MIN: CPT | Mod: S$GLB,,, | Performed by: INTERNAL MEDICINE

## 2017-03-03 PROCEDURE — 1160F RVW MEDS BY RX/DR IN RCRD: CPT | Mod: S$GLB,,, | Performed by: INTERNAL MEDICINE

## 2017-03-06 NOTE — PROGRESS NOTES
Subjective:       Patient ID: Cecile Bowen is a 66 y.o. White female who presents for follow-up evaluation of Chronic Kidney Disease    HPI     She reports an increase in migraine frequency, saw Neurology. She had to cancel her SPC change due to migraine. Edema is stable, still with SOB. She is using an electric scooter. Last Hba1c improved to 6.8.     Review of Systems   Constitutional: Negative for activity change, appetite change, fatigue and unexpected weight change.   HENT: Negative for facial swelling.    Respiratory: Negative for shortness of breath.    Cardiovascular: Negative for chest pain and leg swelling.   Gastrointestinal: Negative for abdominal pain.   Genitourinary: Negative for difficulty urinating and dysuria.   Musculoskeletal: Negative for arthralgias.   Neurological: Negative for weakness.       Objective:      Physical Exam   Constitutional: She is oriented to person, place, and time. She appears well-nourished. No distress.   HENT:   Mouth/Throat: Oropharynx is clear and moist.   Neck: No JVD present.   Cardiovascular: S1 normal and S2 normal.  Exam reveals no friction rub.    Pulmonary/Chest: Breath sounds normal. She has no wheezes. She has no rales.   Abdominal: Soft.   Musculoskeletal: She exhibits edema (trace).   Neurological: She is alert and oriented to person, place, and time.   Skin: Skin is warm and dry.   Psychiatric: She has a normal mood and affect.   Vitals reviewed.      Assessment:       1. CKD (chronic kidney disease), stage IV    2. Secondary hyperparathyroidism, renal    3. Morbid obesity due to excess calories    4. Uncontrolled type 2 diabetes mellitus with stage 4 chronic kidney disease, with long-term current use of insulin    5. Iron deficiency anemia, unspecified iron deficiency anemia type    6. Chronic suprapubic catheter    7. Metabolic acidosis    8. Mobility impaired    9. Fibromyalgia        Plan:             CKD stage 4 with stable kidney function and stable  proteinuria at 400mg. Continue RAAS blockade for renal preservation    HTN is controlled    DM--much improved    MBD--continue current therapy    ARON--H&H WNL, continue po iron      RTC 3 months with labs prior

## 2017-03-08 ENCOUNTER — OFFICE VISIT (OUTPATIENT)
Dept: UROLOGY | Facility: CLINIC | Age: 67
End: 2017-03-08
Payer: COMMERCIAL

## 2017-03-08 VITALS — DIASTOLIC BLOOD PRESSURE: 78 MMHG | HEART RATE: 79 BPM | SYSTOLIC BLOOD PRESSURE: 141 MMHG

## 2017-03-08 DIAGNOSIS — L92.9 GRANULATION TISSUE: ICD-10-CM

## 2017-03-08 DIAGNOSIS — Z43.5 ENCOUNTER FOR SUPRAPUBIC CATHETER CARE: ICD-10-CM

## 2017-03-08 DIAGNOSIS — N31.9 NEUROGENIC BLADDER: Primary | ICD-10-CM

## 2017-03-08 PROCEDURE — 1160F RVW MEDS BY RX/DR IN RCRD: CPT | Mod: S$GLB,,, | Performed by: NURSE PRACTITIONER

## 2017-03-08 PROCEDURE — 99999 PR PBB SHADOW E&M-EST. PATIENT-LVL IV: CPT | Mod: PBBFAC,,, | Performed by: NURSE PRACTITIONER

## 2017-03-08 PROCEDURE — 3077F SYST BP >= 140 MM HG: CPT | Mod: S$GLB,,, | Performed by: NURSE PRACTITIONER

## 2017-03-08 PROCEDURE — 99214 OFFICE O/P EST MOD 30 MIN: CPT | Mod: 25,S$GLB,, | Performed by: NURSE PRACTITIONER

## 2017-03-08 PROCEDURE — 17250 CHEM CAUT OF GRANLTJ TISSUE: CPT | Mod: 51,S$GLB,, | Performed by: NURSE PRACTITIONER

## 2017-03-08 PROCEDURE — 51705 CHANGE OF BLADDER TUBE: CPT | Mod: S$GLB,,, | Performed by: NURSE PRACTITIONER

## 2017-03-08 PROCEDURE — 3078F DIAST BP <80 MM HG: CPT | Mod: S$GLB,,, | Performed by: NURSE PRACTITIONER

## 2017-03-08 NOTE — PROGRESS NOTES
Subjective:       Patient ID: Cecile Bowen is a 66 y.o. female.    Chief Complaint: Urinary Retention (Neurogenic Bladder; SPT change)    HPI Comments: Cecile Bowen is a 66 y.o. Female with history of bilateral hydronephrosis, incomplete bladder emptying which is managed by SPT (16fr).  Her last SPT change was 01/30/2017    Today she voices no complaints.  Here today for new SPT.  Good urine flow through SPT.      Last clinic visit with Dr. Whittington was 11/10/2015.      12/10/2015:  Procedure(s) Performed:   1. Cystoscopy with bladder botox injection  2. Silver nitrate to suprapubic catheter site  3 . Change suprapubic catheter tube            Past Medical History:    Diabetes type 2, uncontrolled                   12/12/2012    Obesity                                         12/12/2012    Hypertension                                    12/12/2012    DJD (degenerative joint disease)                12/12/2012    Hypothyroidism                                  12/12/2012    Nuclear sclerosis - Both Eyes                   3/24/2014     Migraine                                                      Past Surgical History:    TOTAL ABDOMINAL HYSTERECTOMY W/ BILATERAL SALP*  1985          GALLBLADDER SURGERY                              2006          TONSILLECTOMY, ADENOIDECTOMY                                   OVARIAN CYST SURGERY                             1985          HYSTERECTOMY                                                   DILATION AND CURETTAGE OF UTERUS                               Review of patient's family history indicates:    Diabetes                       Sister                    Kidney disease                 Sister                    ALS                            Mother                      Comment: d.    Cancer                         Maternal Grandmother        Comment: d. colon    Cancer                         Paternal Grandfather        Comment: d. lung    Diabetes                        "Maternal Aunt             Kidney disease                 Maternal Aunt             Diabetes                       Maternal Uncle            Amblyopia                      Neg Hx                    Blindness                      Neg Hx                    Cataracts                      Neg Hx                    Glaucoma                       Neg Hx                    Macular degeneration           Neg Hx                    Retinal detachment             Neg Hx                    Strabismus                     Neg Hx                      Social History    Marital Status:             Spouse Name:                       Years of Education:                 Number of children:               Occupational History    None on file    Social History Main Topics    Smoking Status: Never Smoker                      Smokeless Status: Never Used                        Alcohol Use: No              Drug Use: No              Sexual Activity: Not Currently           Birth Control/Protection: Abstinence    Other Topics            Concern    None on file    Social History Narrative    Single      Lives w/ sister  And brother     both are helping her during her post hosp recovery period        Allergies:  Review of patient's allergies indicates no known allergies.    Medications:  Current outpatient prescriptions:amitriptyline (ELAVIL) 10 MG tablet, TAKE 3 TABLETS BY MOUTH IN THE EVENING, Disp: 90 tablet, Rfl: 5;  aspirin (ECOTRIN) 81 MG EC tablet, Take 81 mg by mouth once daily., Disp: , Rfl: ;  BD INSULIN PEN NEEDLE UF SHORT 31 X 5/16 " Ndle, USE ONCE DAILY WITH LANTUS SOLOSTAR PEN INSULIN, Disp: 100 each, Rfl: 11  butalbital-acetaminophen-caffeine -40 mg (FIORICET, ESGIC) -40 mg per tablet, TAKE 1 TABLET EVERY 4 TO 6 HOURS, Disp: 30 tablet, Rfl: 0;  cyanocobalamin, vitamin B-12, (VITAMIN B-12) 2,500 mcg Subl, Place 2,500 mcg under the tongue once daily., Disp: , Rfl: ;  diphenhydrAMINE (BENADRYL) 25 mg capsule, Take " "25 mg by mouth every 6 (six) hours as needed., Disp: , Rfl:   ferrous sulfate 325 (65 FE) MG EC tablet, Take 325 mg by mouth 3 (three) times daily with meals., Disp: , Rfl: ;  fluticasone (FLONASE) 50 mcg/actuation nasal spray, 1 SPRAY IN EACH NOSTRIL DAILY (Patient taking differently: 1 SPRAY IN EACH NOSTRIL DAILY PRN), Disp: 16 g, Rfl: 1;  furosemide (LASIX) 20 MG tablet, Take 1 tablet (20 mg total) by mouth once daily., Disp: 4 tablet, Rfl: 0  gemfibrozil (LOPID) 600 MG tablet, TAKE 1 TABLET BY MOUTH TWICE A DAY, Disp: 60 tablet, Rfl: 5;  (START ON 4/29/2015) hydrocodone-acetaminophen 10-325mg (NORCO)  mg Tab, Take 1 tablet by mouth every 6 (six) hours as needed., Disp: 120 tablet, Rfl: 0;  insulin lispro (HUMALOG) 100 unit/mL injection, Inject 7 Units into the skin 3 (three) times daily before meals., Disp: 6.3 mL, Rfl: 11  insulin syringe-needle U-100 (BD INSULIN SYRINGE ULTRA-FINE) 1/2 mL 31 x 15/64" Syrg, 1 Box by Misc.(Non-Drug; Combo Route) route 4 (four) times daily., Disp: 100 Syringe, Rfl: 12;  lancets (ONE TOUCH DELICA LANCETS) Misc, Ultra 2 lancets to check blood sugar BID, Disp: 100 each, Rfl: 6;  LANTUS SOLOSTAR 100 unit/mL (3 mL) InPn pen, , Disp: , Rfl: 3  LIDODERM 5 %(700 mg/patch), APPLY 1 PATCH TO SKIN DAILY (LEAVING ON FOR 12 HOURS AND OFF FOR 12 HOURS), Disp: 30 patch, Rfl: 6;  magnesium oxide (MAG-OX) 400 mg tablet, TAKE 1 TABLET (400 MG TOTAL) BY MOUTH 2 (TWO) TIMES DAILY., Disp: 60 tablet, Rfl: 11;  methocarbamol (ROBAXIN) 750 MG Tab, TAKE 1 TO 2 TABLETS BY MOUTH 3 TIMES A DAY (Patient taking differently: Take 2 tablets twice daily), Disp: 120 tablet, Rfl: 3  methocarbamol (ROBAXIN) 750 MG Tab, TAKE 1 TO 2 TABLETS BY MOUTH 3 TIMES A DAY, Disp: 120 tablet, Rfl: 3;  methylPREDNISolone (MEDROL DOSEPACK) 4 mg tablet, use as directed, Disp: 21 tablet, Rfl: 0;  multivitamin with minerals tablet, Take 1 tablet by mouth once daily., Disp: , Rfl: ;  pravastatin (PRAVACHOL) 40 MG tablet, TAKE 1 " TABLET BY MOUTH EVERY DAY, Disp: 30 tablet, Rfl: 2  pyridoxine (B-6) 100 MG Tab, Take 1 tablet (100 mg total) by mouth once daily., Disp: 30 tablet, Rfl: 12;  scopolamine (TRANSDERM-SCOP) 1.5 mg, Place 1 patch (1.5 mg total) onto the skin every 72 hours., Disp: 4 patch, Rfl: 0;  sulfamethoxazole-trimethoprim 800-160mg (BACTRIM DS) 800-160 mg Tab, Take 1 tablet by mouth 2 (two) times daily., Disp: , Rfl: 0  sumatriptan (IMITREX) 100 MG tablet, TAKE 1 TABLET (100 MG TOTAL) BY MOUTH ONCE., Disp: 9 tablet, Rfl: 6;  SYNTHROID 125 mcg tablet, TAKE 1 TABLET BY MOUTH DAILY, Disp: 90 tablet, Rfl: 4;  topiramate (TOPAMAX) 100 MG tablet, TAKE 1 TABLET (100 MG TOTAL) BY MOUTH 2 (TWO) TIMES DAILY., Disp: 60 tablet, Rfl: 11;  topiramate (TOPAMAX) 25 MG tablet, Take 4 tablets (100 mg total) by mouth 2 (two) times daily., Disp: 240 tablet, Rfl: 5  venlafaxine (EFFEXOR-XR) 150 MG Cp24, TAKE 1 CAPSULE BY MOUTH ONCE DAILY, Disp: 30 capsule, Rfl: 2;  vitamin D (VITAMIN D3) 185 MG Tab, Take 185 mg by mouth once daily., Disp: , Rfl:         Review of Systems   Constitutional: Negative.  Negative for activity change, appetite change, chills and fever.   HENT: Negative for congestion, facial swelling, mouth sores, rhinorrhea, sore throat and trouble swallowing.    Eyes: Negative for photophobia, discharge and visual disturbance.   Respiratory: Negative for apnea, cough, chest tightness and wheezing.    Cardiovascular: Negative for chest pain and palpitations.   Gastrointestinal: Negative for abdominal pain, anal bleeding, constipation, diarrhea, nausea and vomiting.   Genitourinary: Positive for difficulty urinating. Negative for decreased urine volume, dysuria, flank pain, frequency, hematuria, nocturia, pelvic pain and urgency.        SPT draining well     Musculoskeletal: Positive for arthralgias and back pain. Negative for gait problem, neck pain and neck stiffness.   Skin: Negative.  Negative for rash.   Neurological: Negative for  dizziness, seizures, speech difficulty, weakness and headaches.   Psychiatric/Behavioral: Negative for agitation, confusion, self-injury, sleep disturbance and suicidal ideas. The patient is not nervous/anxious.        Objective:      Physical Exam   Nursing note and vitals reviewed.  Constitutional: She is oriented to person, place, and time. Vital signs are normal. She appears well-developed and well-nourished. She is active and cooperative.   HENT:   Head: Normocephalic.   Right Ear: External ear normal.   Left Ear: External ear normal.   Nose: Nose normal.   Eyes: Conjunctivae and lids are normal. Right eye exhibits no discharge. Left eye exhibits no discharge. No scleral icterus.   Neck: Trachea normal and normal range of motion. Neck supple. No tracheal deviation present.   Cardiovascular: Normal rate, regular rhythm and intact distal pulses.    Pulmonary/Chest: Effort normal. No respiratory distress.   Abdominal: Soft. Bowel sounds are normal. She exhibits no distension and no mass. There is no tenderness. There is no rebound, no guarding and no CVA tenderness.       Musculoskeletal: Normal range of motion. She exhibits no edema.   Neurological: She is alert and oriented to person, place, and time.   Skin: Skin is warm, dry and intact.     Psychiatric: She has a normal mood and affect. Her behavior is normal. Judgment and thought content normal.       Assessment:       1. Neurogenic bladder    2. Encounter for suprapubic catheter care    3. Granulation tissue        Plan:         I spent 30 minutes with the patient of which more than half was spent in direct consultation with the patient in regards to our treatment and plan.    Education and recommendations of today's plan of care including home remedies.  I easily exchanged her 16fr SPT out using sterile technique.  Clear yellow urine received and balloon inflated with 8cc sterile water.  I clipped the excessive granulating tissue and used 3 silver nitrate  sticks to cauterize.   Tolerated well.  RTC one month for new 16fr SPT      I used

## 2017-03-08 NOTE — MR AVS SNAPSHOT
[facility]  [address]  [facility] [address]  Phone: [phone]                  Cecile Bowen   3/8/2017 2:00 PM   Office Visit    Description:  Female : 1950   Provider:  Tesha Ken NP   Department:  [facility]           Reason for Visit     Urinary Retention           Diagnoses this Visit        Comments    Neurogenic bladder    -  Primary     Encounter for suprapubic catheter care         Granulation tissue                To Do List           Future Appointments        Provider Department Dept Phone    3/22/2017 1:40 PM MD [facility] Medicine [phone]    3/29/2017 10:00 AM LAB, APPOINTMENT [place] [facility] [phone]    4/3/2017 1:00 PM Pura Hines NP [facility] [phone]    2017 1:30 PM Sumaya Dwyer DNP, NP [facility] - Endocrinology [phone]    2017 2:00 PM [facility] [place] [phone]      Goals (5 Years of Data)     None      Follow-Up and Disposition     Return in about 4 weeks (around 2017).      [facility]     [facility] - 24/7 Assistance  Registered nurses in the [facility] provide clinical advisement, health education, appointment booking, and other advisory services.  Call for this free service at [phone].             Medications           Message regarding Medications     Verify the changes and/or additions to your medication regime listed below are the same as discussed with your clinician today.  If any of these changes or additions are incorrect, please notify your healthcare provider.             Verify that the below list of medications is an accurate representation of the medications you are currently taking.  If none reported, the list may be blank. If incorrect, please contact your healthcare provider. Carry this list with you in case of  "emergency.           Current Medications     aspirin (ECOTRIN) 81 MG EC tablet Take 81 mg by mouth once daily.    BD INSULIN SYRINGE ULTRA-FINE 1/2 mL 31 gauge x 15/64" Syrg USE WITH INSULIN 4 TIMES A DAY    cyanocobalamin, vitamin B-12, (VITAMIN B-12) 5,000 mcg Subl Place 1 tablet under the tongue once daily.    ferrous sulfate 325 (65 FE) MG EC tablet Take 325 mg by mouth once daily.     gemfibrozil (LOPID) 600 MG tablet TAKE 1 TABLET BY MOUTH TWICE A DAY    hydrocodone-acetaminophen 10-325mg (NORCO)  mg Tab Starting on Apr 14, 2017. Take 1 tablet by mouth every 6 (six) hours as needed.    insulin lispro (HUMALOG) 100 unit/mL injection 7-10-12 + correction    lancets (ONE TOUCH DELICA LANCETS) Misc Ultra 2 lancets to check blood sugar BID    LANTUS SOLOSTAR 100 unit/mL (3 mL) InPn pen INJECT 19 UNITS INTO THE SKIN EVERY EVENING.    levothyroxine (SYNTHROID) 75 MCG tablet Take 1 tablet (75 mcg total) by mouth once daily.    lidocaine (LIDODERM) 5 % APPLY 1 PATCH TO SKIN 12 HOURS ON 12 HOURS OFF    magnesium oxide (MAG-OX) 400 mg tablet TAKE 1 TABLET (400 MG TOTAL) BY MOUTH 2 (TWO) TIMES DAILY.    memantine (NAMENDA) 10 MG Tab Take 1 tablet (10 mg total) by mouth 2 (two) times daily.    methocarbamol (ROBAXIN) 750 MG Tab TAKE 1-2 TABLETS BY MOUTH 4 TIMES DAILY AS NEEDED    multivitamin with minerals tablet Take 1 tablet by mouth once daily.    oxybutynin (DITROPAN-XL) 10 MG 24 hr tablet Take 1 tablet (10 mg total) by mouth once daily.    pravastatin (PRAVACHOL) 40 MG tablet TAKE 1 TABLET BY MOUTH EVERY DAY    pyridoxine (B-6) 100 MG Tab Take 1 tablet (100 mg total) by mouth once daily.    scopolamine (TRANSDERM-SCOP) 1.5 mg (1 mg over 3 days) Place 1 patch (1.5 mg total) onto the skin every 72 hours.    sodium bicarbonate 650 MG tablet Take 1 tablet (650 mg total) by mouth 3 (three) times daily.    sumatriptan (IMITREX) 100 MG tablet Take 1 tablet (100 mg total) by mouth 2 (two) times daily as needed for " Migraine.    topiramate (TOPAMAX) 100 MG tablet TAKE 1 TABLET (100 MG TOTAL) BY MOUTH 2 (TWO) TIMES DAILY.    trazodone (DESYREL) 100 MG tablet TAKE 1 TABLET BY MOUTH AT BEDTIME MAY TAKE AN EXTRA HALF TABLET IF NEEDED    venlafaxine (EFFEXOR-XR) 75 MG 24 hr capsule TAKE 2 CAPSULES BY MOUTH ONCE DAILY           Clinical Reference Information           Your Vitals Were     BP Pulse                141/78 (BP Location: Right arm, Patient Position: Sitting, BP Method: Automatic) 79          Blood Pressure          Most Recent Value    BP  (!)  141/78      Allergies as of 3/8/2017     No Known Allergies      Immunizations Administered on Date of Encounter - 3/8/2017     None      Language Assistance Services     ATTENTION: Language assistance services are available, free of charge. Please call [phone].      ATENCIÓN: Si alon darling, tiene a chinchilla disposición servicios gratuitos de asistencia lingüística. Llame al [phone].     CHÚ Ý: N?u b?n nói Ti?ng Vi?t, có các d?ch v? h? tr? ngôn ng? mi?n phí dành cho b?n. G?i s? [phone].         [facility] complies with applicable Federal civil rights laws and does not discriminate on the basis of race, color, national origin, age, disability, or sex.

## 2017-03-09 ENCOUNTER — PATIENT MESSAGE (OUTPATIENT)
Dept: ENDOCRINOLOGY | Facility: CLINIC | Age: 67
End: 2017-03-09

## 2017-03-09 ENCOUNTER — PATIENT MESSAGE (OUTPATIENT)
Dept: NEPHROLOGY | Facility: CLINIC | Age: 67
End: 2017-03-09

## 2017-03-10 ENCOUNTER — PATIENT MESSAGE (OUTPATIENT)
Dept: NEPHROLOGY | Facility: CLINIC | Age: 67
End: 2017-03-10

## 2017-03-14 RX ORDER — ERGOCALCIFEROL 1.25 MG/1
CAPSULE ORAL
Qty: 4 CAPSULE | Refills: 0 | Status: SHIPPED | OUTPATIENT
Start: 2017-03-14 | End: 2017-04-13 | Stop reason: SDUPTHER

## 2017-03-20 ENCOUNTER — PATIENT MESSAGE (OUTPATIENT)
Dept: RHEUMATOLOGY | Facility: CLINIC | Age: 67
End: 2017-03-20

## 2017-03-22 ENCOUNTER — OFFICE VISIT (OUTPATIENT)
Dept: INTERNAL MEDICINE | Facility: CLINIC | Age: 67
End: 2017-03-22
Payer: COMMERCIAL

## 2017-03-22 VITALS
WEIGHT: 293 LBS | TEMPERATURE: 98 F | HEART RATE: 104 BPM | DIASTOLIC BLOOD PRESSURE: 84 MMHG | HEIGHT: 68 IN | BODY MASS INDEX: 44.41 KG/M2 | SYSTOLIC BLOOD PRESSURE: 118 MMHG | RESPIRATION RATE: 16 BRPM

## 2017-03-22 DIAGNOSIS — Z12.31 ENCOUNTER FOR SCREENING MAMMOGRAM FOR BREAST CANCER: ICD-10-CM

## 2017-03-22 DIAGNOSIS — N18.4 CKD (CHRONIC KIDNEY DISEASE), STAGE IV: ICD-10-CM

## 2017-03-22 DIAGNOSIS — G44.209 MIXED MIGRAINE AND MUSCLE CONTRACTION HEADACHE: ICD-10-CM

## 2017-03-22 DIAGNOSIS — G43.909 MIXED MIGRAINE AND MUSCLE CONTRACTION HEADACHE: ICD-10-CM

## 2017-03-22 DIAGNOSIS — I10 ESSENTIAL HYPERTENSION: ICD-10-CM

## 2017-03-22 DIAGNOSIS — E78.5 HYPERLIPIDEMIA, UNSPECIFIED HYPERLIPIDEMIA TYPE: Primary | ICD-10-CM

## 2017-03-22 PROCEDURE — 99214 OFFICE O/P EST MOD 30 MIN: CPT | Mod: S$GLB,,, | Performed by: INTERNAL MEDICINE

## 2017-03-22 PROCEDURE — 3079F DIAST BP 80-89 MM HG: CPT | Mod: S$GLB,,, | Performed by: INTERNAL MEDICINE

## 2017-03-22 PROCEDURE — 3044F HG A1C LEVEL LT 7.0%: CPT | Mod: S$GLB,,, | Performed by: INTERNAL MEDICINE

## 2017-03-22 PROCEDURE — 3066F NEPHROPATHY DOC TX: CPT | Mod: S$GLB,,, | Performed by: INTERNAL MEDICINE

## 2017-03-22 PROCEDURE — 3074F SYST BP LT 130 MM HG: CPT | Mod: S$GLB,,, | Performed by: INTERNAL MEDICINE

## 2017-03-22 PROCEDURE — 99999 PR PBB SHADOW E&M-EST. PATIENT-LVL III: CPT | Mod: PBBFAC,,, | Performed by: INTERNAL MEDICINE

## 2017-03-22 PROCEDURE — 1160F RVW MEDS BY RX/DR IN RCRD: CPT | Mod: S$GLB,,, | Performed by: INTERNAL MEDICINE

## 2017-03-22 NOTE — PROGRESS NOTES
66 y.o. femalepresents in f/u to diabetes,CKD,IV, HLD, VIJAYA, ,and HA,    DM w/ neuro/renal, tx w/ lantus  20 U And Humalog 7-11 U premeal  Denied increased thirst, urination, or hypoglycemia episodes  A1C ==>6.8    Dyslipidemia tx w/gemfibrozil 60mg BID   and pravastatin 40mg  Denied unusual muscle pain or chest pain  LDL==>119    CKD, IV  EGFR==>24.3  Cr==> 2.1  Denied decreased urination  ++ hydration- water primarily; drinking 2 diet cokes daily for MOELLER  tx    MOELLER, Dr. Case trying to wean down some of meds  Pt awaiting VIJAYA screen for dx and possible CPAP that he is hopeful will improve sx  Pt not as optimistic, thinks multifactorial and noted she is to see a psychiatrist about her HA's   Reported she is not crazy     ROS:  No fever, chills, or night sweats  No blurry vision or visual disturbance  No dysphagia or early satiety  No palpitations or tachycardia  No cough or wheezing  No GERD or abdominal pain  No numbness or focal deficits  Remainder of review negative except as previously noted    PAST MEDICAL HX: Reviewed  PAST SURGICAL HX: Reviewed  SOCIAL HX: Reviewed  FAMILY HX: Reviewed    PHYSICAL EXAM:  VS: As noted  GENERAL: WDWN, A&O, NAD, conversant and co-operative;   Appears fatigued  EYES: Conj/lids unremarkable, sclera anicteric  ENT:Sinus nontender  NECK: Supple w/o lymphadenopathy or thyromegaly  RESPIRATORY: Efforts are unlabored. Lungs CTAP  CARDIOVASCULAR: Heart RRR. No carotid bruits noted  . 1+ pedal pulses. Trace LE edema  MUSCULOSKELETAL: Gait impaired, using scooter.   NEUROLOGIC: DONALD. No tremor noted  SKIN: Warm and dry      IMPRESSION:  HLD, asx  DM w/ renal, improved  CKD, IV, stable  HTN, stable  HA, tension/MSK/migraine, VIJAYA w/up pending , psych consult pending  Fibromyalgia, chronic  OA, multijoint    PLAN:  Mammo   Follow up Endo  Continue present meds  Keep well hydrated - water best  [facility]is[facility] Nephrology  Call prn  RTC 6 mos EPP

## 2017-03-30 ENCOUNTER — HOSPITAL ENCOUNTER (OUTPATIENT)
Dept: RADIOLOGY | Facility: HOSPITAL | Age: 67
Discharge: HOME | End: 2017-03-30
Attending: INTERNAL MEDICINE
Payer: COMMERCIAL

## 2017-03-30 DIAGNOSIS — Z12.31 ENCOUNTER FOR SCREENING MAMMOGRAM FOR BREAST CANCER: ICD-10-CM

## 2017-03-30 PROCEDURE — 77067 SCR MAMMO BI INCL CAD: CPT | Mod: TC

## 2017-03-30 PROCEDURE — 77067 SCR MAMMO BI INCL CAD: CPT | Mod: 26,,, | Performed by: RADIOLOGY

## 2017-04-03 ENCOUNTER — OFFICE VISIT (OUTPATIENT)
Dept: SLEEP MEDICINE | Facility: CLINIC | Age: 67
End: 2017-04-03
Payer: COMMERCIAL

## 2017-04-03 ENCOUNTER — TELEPHONE (OUTPATIENT)
Dept: INTERNAL MEDICINE | Facility: CLINIC | Age: 67
End: 2017-04-03

## 2017-04-03 VITALS
DIASTOLIC BLOOD PRESSURE: 80 MMHG | SYSTOLIC BLOOD PRESSURE: 134 MMHG | BODY MASS INDEX: 44.41 KG/M2 | HEART RATE: 74 BPM | HEIGHT: 68 IN | WEIGHT: 293 LBS

## 2017-04-03 DIAGNOSIS — G47.33 OBSTRUCTIVE SLEEP APNEA: Primary | ICD-10-CM

## 2017-04-03 PROCEDURE — 99214 OFFICE O/P EST MOD 30 MIN: CPT | Mod: S$GLB,,, | Performed by: NURSE PRACTITIONER

## 2017-04-03 PROCEDURE — 3079F DIAST BP 80-89 MM HG: CPT | Mod: S$GLB,,, | Performed by: NURSE PRACTITIONER

## 2017-04-03 PROCEDURE — 99999 PR PBB SHADOW E&M-EST. PATIENT-LVL IV: CPT | Mod: PBBFAC,,, | Performed by: NURSE PRACTITIONER

## 2017-04-03 PROCEDURE — 1160F RVW MEDS BY RX/DR IN RCRD: CPT | Mod: S$GLB,,, | Performed by: NURSE PRACTITIONER

## 2017-04-03 PROCEDURE — 3075F SYST BP GE 130 - 139MM HG: CPT | Mod: S$GLB,,, | Performed by: NURSE PRACTITIONER

## 2017-04-03 NOTE — PROGRESS NOTES
Cecile Bowen  was seen as f/u today for mgt of VIJAYA. Last seen by MD Romero Sept 2013, this is my initial visit with her.     After last seen, she underwent a sleep study revealing mild VIJAYA. She did not pursue treatment at that time. She continues to report mild daytime sleepiness, snoring,infrequent disrupted sleep, daily am headaches. Reports she has 5 types of headaches and not all of them are due to sleep apnea. ESS=6. +TMJ. Quality of sleep rates 3/5 . Takes Norco and Robaxin atleast daily. Uses motorized scooter to help mobility/reduce back pain from prolonged walking. Ready to begin PAP therapy. Sleeps 6-7h/night. Occasional dry mouth in am. ++myalgia/body aches/low back pain/ neck pain. Denies RLS symptoms or mood disturbance.           04/03/2017   INITIAL HISTORY OF PRESENT ILLNESS:  Cecile Bowen is a 66 y.o. female is here to be evaluated for a sleep disorder.       CHIEF COMPLAINT:      The patient's complaints include excessive daytime sleepiness, excessive daytime fatigue, snoring and interrupted sleep for many years.    Reports  dry mouth and sore throat  Denies nasal congestion   Reports  morning headaches  Reports  interrupted sleep  Denies frequent leg movements  Denies symptoms concerning for parasomnia    The ESS ([place] Sleepiness Score) taken on initial visit is 9 /24    The patient had tonsillectomy in the past; sinus surgery in 1988    SLEEP ROUTINE:  Bed partner: lives with her brother and sister  Time to bed: 11 PM  Sleep onset latency: 15-20 min  Disruptions or awakenings: 2-3  Time to fall back into sleep: 5-10 min  Wakeup time: 5:30-6 AM, then tries to sleep 2 hours   Perceived sleep quality: 3-4  Perceived total sleep time:  6-7 hours  hours.  Daytime naps: 1  Weekend sleep routine: till 8 AM  Exercise routine: no    The patient has mentioned difficulty falling and staying asleep.    The patient states that she has already made some changes in regard to sleep hygiene, making  sure that the bedroom is dark, features comfortable temperature and humidity level. The patient does watch TV and read in bed. she goes to bed before she is sleepy and gets out of bed if not asleep within 30 minutes. she avoids clock watching and tries not to worry about her ability to fall asleep.     The patient has tried the following sleeping aids: Ambien    PSG 10/21/13 (318#) AHI 10.8/low sat 87%        PAST MEDICAL HISTORY:    Active Ambulatory Problems     Diagnosis Date Noted    Hypertension 12/12/2012    Hypothyroidism 12/12/2012    Fibromyalgia 12/17/2012    Chronic rhinitis 03/23/2013    Intractable chronic migraine without aura 08/27/2013    Vitamin D deficiency disease 03/11/2014    Hyperlipidemia 03/11/2014    VIJAYA (obstructive sleep apnea) 03/17/2014    Nuclear sclerosis - Both Eyes 03/24/2014    Dizziness and giddiness 09/22/2014    Falls frequently 09/30/2014    Abnormality of gait and mobility 10/29/2014    Mobility impaired 10/29/2014    CKD (chronic kidney disease), stage IV 12/29/2014    Osteoarthritis of lumbar spine 02/20/2015    Recurrent UTI 02/24/2015    Enlarged lymph node 03/12/2015    Anemia 03/12/2015    Iron deficiency anemia 03/16/2015    Primary osteoarthritis involving multiple joints 09/15/2015    Encounter for care or replacement of suprapubic tube 10/06/2015    Urinary retention 11/10/2015    Metabolic acidosis 12/07/2015    Neurogenic bladder 12/14/2015    Major depressive disorder, recurrent episode, moderate 03/10/2016    Insomnia 03/10/2016    Mixed migraine and muscle contraction headache 03/10/2016    Secondary hyperparathyroidism, renal 03/22/2016    Uncontrolled type 2 diabetes mellitus with chronic kidney disease, with long-term current use of insulin 07/06/2016    Chronic suprapubic catheter 09/19/2016    Pulmonary nodule 11/10/2016    Osteopenia 01/09/2017    Morbid obesity due to excess calories 01/09/2017    Special screening for  "malignant neoplasms, colon 01/13/2017    Medication overuse headache 01/20/2017    Chronic daily headache 01/20/2017    Long term prescription opiate use 01/26/2017     Occupation:retired teacher                REVIEW OF SYSTEMS: Sleep related symptoms as per HPI, denies sinus congestion,  otherwise a balance review of 10-systems is negative.     PHYSICAL EXAM:  /80  Pulse 74  Ht 5' 8" (1.727 m)  Wt 134.7 kg (296 lb 15.4 oz)  BMI 45.15 kg/m2  GENERAL: morbid obese body habitus, well groomed.      ASSESSMENT:    1. VIJAYA, mild, ready to pursue definitive treatment. She has medical co-morbidities of migraine, DM, fibromyalgia, morbid obesity, hypothyroidism and hypertension,  which can be worsened by VIJAYA.         PLAN:  We discussed potential treatment options, which could include weight loss (10-15%), body positioning, continuous positive airway pressure (CPAP-defintive), mandibular advancement splint by dentist. Discussed etiology of VIJAYA and potential ramifications of untreated VIJAYA, including heart disease, hypertension, cognitive difficulties, stroke, and diabetes.      APAP setup 6-16cm, [facility]S DME pending authorization. RTC 4-5 wks AFTER setup adherence monitoring.     Encouraged weight loss efforts for potential improvement of VIJAYA and overall health benefits        "

## 2017-04-03 NOTE — MR AVS SNAPSHOT
[facility]  [address]  [facility] [address]  Phone: [phone]                  Cecile Bowen   4/3/2017 1:00 PM   Office Visit    Description:  Female : 1950   Provider:  Pura Hines NP   Department:  [facility]           Reason for Visit     Sleep Apnea           Diagnoses this Visit        Comments    Obstructive sleep apnea    -  Primary            To Do List           Future Appointments        Provider Department Dept Phone    2017 1:30 PM Sumaya Dwyer DNP, NP [facility] Endocrinology [phone]    2017 2:00 PM Tesha Ken NP [facility] [place] [phone]    2017 2:20 PM Thor Garvey MD [facility] Rheumatology [phone]    2017 7:15 AM LAB, SAME DAY [facility] [phone]    2017 7:30 AM SPECIMEN, MAIN CAMPUS [facility] [phone]      Goals (5 Years of Data)     None      Follow-Up and Disposition     Return for RTC 4-5 wks AFTER setup.      [facility]     [facility] Care [facility] -  Assistance  Unless otherwise directed by your provider, please contact [facility], our nurse care line that is available for  assistance.     Registered nurses in the [facility] provide: appointment scheduling, clinical advisement, health education, and other advisory services.  Call: [phone] (toll free)               Medications           Message regarding Medications     Verify the changes and/or additions to your medication regime listed below are the same as discussed with your clinician today.  If any of these changes or additions are incorrect, please notify your healthcare provider.             Verify that the below list of medications is an accurate representation of the medications you are currently taking.  If none reported, the list may be blank. If incorrect, please contact your healthcare provider. Carry this  "list with you in case of emergency.           Current Medications     aspirin (ECOTRIN) 81 MG EC tablet Take 81 mg by mouth once daily.    BD INSULIN SYRINGE ULTRA-FINE 1/2 mL 31 gauge x 15/64" Syrg USE WITH INSULIN 4 TIMES A DAY    cyanocobalamin, vitamin B-12, (VITAMIN B-12) 5,000 mcg Subl Place 1 tablet under the tongue once daily.    ferrous sulfate 325 (65 FE) MG EC tablet Take 325 mg by mouth once daily.     gemfibrozil (LOPID) 600 MG tablet TAKE 1 TABLET BY MOUTH TWICE A DAY    hydrocodone-acetaminophen 10-325mg (NORCO)  mg Tab Starting on Apr 14, 2017. Take 1 tablet by mouth every 6 (six) hours as needed.    insulin lispro (HUMALOG) 100 unit/mL injection 7-10-12 + correction    lancets (ONE TOUCH DELICA LANCETS) Misc Ultra 2 lancets to check blood sugar BID    LANTUS SOLOSTAR 100 unit/mL (3 mL) InPn pen INJECT 19 UNITS INTO THE SKIN EVERY EVENING.    levothyroxine (SYNTHROID) 75 MCG tablet Take 1 tablet (75 mcg total) by mouth once daily.    lidocaine (LIDODERM) 5 % APPLY 1 PATCH TO SKIN 12 HOURS ON 12 HOURS OFF    magnesium oxide (MAG-OX) 400 mg tablet TAKE 1 TABLET (400 MG TOTAL) BY MOUTH 2 (TWO) TIMES DAILY.    memantine (NAMENDA) 10 MG Tab Take 1 tablet (10 mg total) by mouth 2 (two) times daily.    methocarbamol (ROBAXIN) 750 MG Tab TAKE 1-2 TABLETS BY MOUTH 4 TIMES DAILY AS NEEDED    multivitamin with minerals tablet Take 1 tablet by mouth once daily.    oxybutynin (DITROPAN-XL) 10 MG 24 hr tablet Take 1 tablet (10 mg total) by mouth once daily.    pravastatin (PRAVACHOL) 40 MG tablet TAKE 1 TABLET BY MOUTH EVERY DAY    pyridoxine (B-6) 100 MG Tab Take 1 tablet (100 mg total) by mouth once daily.    scopolamine (TRANSDERM-SCOP) 1.5 mg (1 mg over 3 days) Place 1 patch (1.5 mg total) onto the skin every 72 hours.    sodium bicarbonate 650 MG tablet Take 1 tablet (650 mg total) by mouth 3 (three) times daily.    sumatriptan (IMITREX) 100 MG tablet Take 1 tablet (100 mg total) by mouth 2 (two) times " "daily as needed for Migraine.    topiramate (TOPAMAX) 100 MG tablet TAKE 1 TABLET (100 MG TOTAL) BY MOUTH 2 (TWO) TIMES DAILY.    trazodone (DESYREL) 100 MG tablet TAKE 1 TABLET BY MOUTH AT BEDTIME MAY TAKE AN EXTRA HALF TABLET IF NEEDED    venlafaxine (EFFEXOR-XR) 75 MG 24 hr capsule TAKE 2 CAPSULES BY MOUTH ONCE DAILY    VITAMIN D2 50,000 unit capsule TAKE 1 CAPSULE (50,000 UNITS TOTAL) BY MOUTH EVERY 7 DAYS.           Clinical Reference Information           Your Vitals Were     BP Pulse Height Weight BMI    134/80 74 5' 8" (1.727 m) 134.7 kg (296 lb 15.4 oz) 45.15 kg/m2      Blood Pressure          Most Recent Value    BP  134/80      Allergies as of 4/3/2017     No Known Allergies      Immunizations Administered on Date of Encounter - 4/3/2017     None      Orders Placed During Today's Visit      Normal Orders This Visit    CPAP FOR HOME USE       Instructions      Obstructive Sleep Apnea  Obstructive sleep apnea is a condition that causes your air passages to become narrowed or blocked during sleep. As a result, breathing stops for short periods. Your body wakes up enough for breathing to begin again, though you don't remember it. The cycle of stopped breathing and brief awakenings can repeat dozens of times a night. This prevents the body from getting to the deeper stages of sleep that are needed for good rest.  Signs of sleep apnea include loud snoring, noisy breathing, and gasping sounds during sleep. Daytime symptoms include waking up tired after a full night's sleep, waking up with headaches, feeling very sleepy or falling asleep during the day, and having problems with memory or concentration.  Risk factors for sleep apnea include:  · Being overweight  · Being a man, or a woman in menopause  · Smoking  · Using alcohol or sedating medications or herbs  · Having enlarged structures in the nose or throat  Home care  Lifestyle changes that can help treat snoring and sleep apnea include the following:  · If " you are overweight, lose weight. Talk to your healthcare provider about a weight-loss plan for you.  · Avoid alcohol for 3 to 4 hours before bedtime. Avoid sedating medications. Ask your healthcare provider about the medications you take.  · If you smoke, talk to your healthcare provider about ways to quit.  · Sleep on your side. This can help prevent gravity from pulling relaxed throat tissues into your breathing passages.  · If you have allergies or sinus problems that block your nose, ask your healthcare provider for help.  Follow up  Follow up with your healthcare provider as advised. A diagnosis of sleep apnea is made with a sleep study. Your healthcare provider can tell you more about this test.  When to seek medical care  Sleep apnea can make you more likely to have certain health problems. These include high blood pressure, heart attack, stroke, and sexual dysfunction. If you have sleep apnea, talk to your healthcare provider about the best treatments for you.  Date Last Reviewed: 5/3/2015  © 1548-4292 [employer]. [address] [place] [address]. All rights reserved. This information is not intended as a substitute for professional medical care. Always follow your healthcare professional's instructions.      [facility] [phone] ([facility]       Language Assistance Services     ATTENTION: Language assistance services are available, free of charge. Please call [phone].      ATENCIÓN: Si mariselala phong, tiene a chinchilla disposición servicios gratuitos de asistencia lingüística. Llame al [phone].     [facility] Ý: N?u b?n nói Ti?ng Vi?t, có các d?ch v? h? tr? ngôn ng? mi?n phí dành cho b?n. G?i s? [phone].         [facility] complies with applicable Federal civil rights laws and does not discriminate on the basis of race, color, national origin, age, disability, or sex.

## 2017-04-03 NOTE — TELEPHONE ENCOUNTER
----- Message from Lurdes Navarro MD sent at 4/1/2017  2:42 PM CDT -----  Please note that your cholesterol has improved from 176 to 150  Your good cholesterol has decreased and if you can increase your activity that should help raise the level again  You should continue to take your pravastatin  Lurdes Cancino

## 2017-04-03 NOTE — LETTER
April 4, 2017      Saroj Case MD  [address]  [facility] [address]           [facility]  [address]  [facility] [address]  Phone: [phone]          Patient: Cecile Bowen   MR Number: [id number]   YOB: 1950   Date of Visit: 4/3/2017       Dear Dr. Saroj Case:    Thank you for referring Cecile Bowen to me for evaluation. Attached you will find relevant portions of my assessment and plan of care.    If you have questions, please do not hesitate to call me. I look forward to following Cecile Bowen along with you.    Sincerely,    Pura Hines, NP    Enclosure  CC:  No Recipients    If you would like to receive this communication electronically, please contact [email or web address] or [phone] to request more information on [employer] access.    For providers and/or their staff who would like to refer a patient to [facility], please contact us through our one-stop-shop provider referral line, [facility], at [phone].    If you feel you have received this communication in error or would no longer like to receive these types of communications, please e-mail [email or web address]

## 2017-04-03 NOTE — PATIENT INSTRUCTIONS
Obstructive Sleep Apnea  Obstructive sleep apnea is a condition that causes your air passages to become narrowed or blocked during sleep. As a result, breathing stops for short periods. Your body wakes up enough for breathing to begin again, though you don't remember it. The cycle of stopped breathing and brief awakenings can repeat dozens of times a night. This prevents the body from getting to the deeper stages of sleep that are needed for good rest.  Signs of sleep apnea include loud snoring, noisy breathing, and gasping sounds during sleep. Daytime symptoms include waking up tired after a full night's sleep, waking up with headaches, feeling very sleepy or falling asleep during the day, and having problems with memory or concentration.  Risk factors for sleep apnea include:  · Being overweight  · Being a man, or a woman in menopause  · Smoking  · Using alcohol or sedating medications or herbs  · Having enlarged structures in the nose or throat  Home care  Lifestyle changes that can help treat snoring and sleep apnea include the following:  · If you are overweight, lose weight. Talk to your healthcare provider about a weight-loss plan for you.  · Avoid alcohol for 3 to 4 hours before bedtime. Avoid sedating medications. Ask your healthcare provider about the medications you take.  · If you smoke, talk to your healthcare provider about ways to quit.  · Sleep on your side. This can help prevent gravity from pulling relaxed throat tissues into your breathing passages.  · If you have allergies or sinus problems that block your nose, ask your healthcare provider for help.  Follow up  Follow up with your healthcare provider as advised. A diagnosis of sleep apnea is made with a sleep study. Your healthcare provider can tell you more about this test.  When to seek medical care  Sleep apnea can make you more likely to have certain health problems. These include high blood pressure, heart attack, stroke, and sexual  dysfunction. If you have sleep apnea, talk to your healthcare provider about the best treatments for you.  Date Last Reviewed: 5/3/2015  © 7452-0732 [employer]. [address] [place] [address], [place] [address]. All rights reserved. This information is not intended as a substitute for professional medical care. Always follow your healthcare professional's instructions.      [facility] [phone] ([facility])

## 2017-04-04 ENCOUNTER — PATIENT MESSAGE (OUTPATIENT)
Dept: NEUROLOGY | Facility: CLINIC | Age: 67
End: 2017-04-04

## 2017-04-05 ENCOUNTER — TELEPHONE (OUTPATIENT)
Dept: NEUROLOGY | Facility: CLINIC | Age: 67
End: 2017-04-05

## 2017-04-05 ENCOUNTER — TELEPHONE (OUTPATIENT)
Dept: ENDOCRINOLOGY | Facility: CLINIC | Age: 67
End: 2017-04-05

## 2017-04-05 NOTE — TELEPHONE ENCOUNTER
----- Message from Linh Santos sent at 4/5/2017 10:55 AM CDT -----  Contact: patient  [phone]  esther   -  Patient called stating that she is in really bad back pain and needs to speak with the nurse   Thanks,

## 2017-04-05 NOTE — TELEPHONE ENCOUNTER
Spoke to patient and she asked to reschedule her appointment today with CAROLE Dwyer due to her back was hurting to bad. I reschedule her appointment while we were on the phone.

## 2017-04-07 RX ORDER — LANOLIN ALCOHOL/MO/W.PET/CERES
CREAM (GRAM) TOPICAL
Qty: 60 TABLET | Refills: 11 | Status: SHIPPED | OUTPATIENT
Start: 2017-04-07 | End: 2017-11-27 | Stop reason: SDUPTHER

## 2017-04-12 ENCOUNTER — OFFICE VISIT (OUTPATIENT)
Dept: UROLOGY | Facility: CLINIC | Age: 67
End: 2017-04-12
Payer: COMMERCIAL

## 2017-04-12 VITALS
HEIGHT: 68 IN | SYSTOLIC BLOOD PRESSURE: 137 MMHG | BODY MASS INDEX: 44.41 KG/M2 | RESPIRATION RATE: 16 BRPM | HEART RATE: 88 BPM | WEIGHT: 293 LBS | DIASTOLIC BLOOD PRESSURE: 75 MMHG

## 2017-04-12 DIAGNOSIS — G47.33 OBSTRUCTIVE SLEEP APNEA: Primary | ICD-10-CM

## 2017-04-12 DIAGNOSIS — N31.9 NEUROGENIC BLADDER: Primary | ICD-10-CM

## 2017-04-12 DIAGNOSIS — Z43.5 ENCOUNTER FOR SUPRAPUBIC CATHETER CARE: ICD-10-CM

## 2017-04-12 PROCEDURE — 3075F SYST BP GE 130 - 139MM HG: CPT | Mod: S$GLB,,, | Performed by: NURSE PRACTITIONER

## 2017-04-12 PROCEDURE — 1160F RVW MEDS BY RX/DR IN RCRD: CPT | Mod: S$GLB,,, | Performed by: NURSE PRACTITIONER

## 2017-04-12 PROCEDURE — 17250 CHEM CAUT OF GRANLTJ TISSUE: CPT | Mod: 51,S$GLB,, | Performed by: NURSE PRACTITIONER

## 2017-04-12 PROCEDURE — 3078F DIAST BP <80 MM HG: CPT | Mod: S$GLB,,, | Performed by: NURSE PRACTITIONER

## 2017-04-12 PROCEDURE — 99999 PR PBB SHADOW E&M-EST. PATIENT-LVL IV: CPT | Mod: PBBFAC,,, | Performed by: NURSE PRACTITIONER

## 2017-04-12 PROCEDURE — 99213 OFFICE O/P EST LOW 20 MIN: CPT | Mod: 25,S$GLB,, | Performed by: NURSE PRACTITIONER

## 2017-04-12 PROCEDURE — 51705 CHANGE OF BLADDER TUBE: CPT | Mod: S$GLB,,, | Performed by: NURSE PRACTITIONER

## 2017-04-12 NOTE — MR AVS SNAPSHOT
[facility]  [address] [address]  Phone: [phone]                  Cecile Bowen   2017 2:00 PM   Office Visit    Description:  Female : 1950   Provider:  Tesha Ken NP   Department:  [facility]           Reason for Visit     neurogenic bladder           Diagnoses this Visit        Comments    Neurogenic bladder    -  Primary     Encounter for suprapubic catheter care                To Do List           Future Appointments        Provider Department Dept Phone    2017 3:00 PM Sumaya Dwyer DNP, NP [facility] Endocrinology [phone]    2017 2:20 PM Thor Garvey MD [facility] Rheumatology [phone]    2017 7:15 AM LAB, SAME DAY [facility] [phone]    2017 7:30 AM SPECIMEN, MAIN CAMPUS [facility] [phone]    2017 1:40 PM Michael López MD [facility] Nephrology [phone]      Goals (5 Years of Data)     None      Follow-Up and Disposition     Return in about 4 weeks (around 5/10/2017).      [facility]     [facility] -  Assistance  Unless otherwise directed by your provider, please contact [facility], our nurse care line that is available for  assistance.     Registered nurses in the [facility] provide: appointment scheduling, clinical advisement, health education, and other advisory services.  Call: [phone] (toll free)               Medications           Message regarding Medications     Verify the changes and/or additions to your medication regime listed below are the same as discussed with your clinician today.  If any of these changes or additions are incorrect, please notify your healthcare provider.        STOP taking these medications     pyridoxine (B-6) 100 MG Tab Take 1 tablet (100 mg total) by mouth once daily.           Verify that the below list of medications is an accurate  "representation of the medications you are currently taking.  If none reported, the list may be blank. If incorrect, please contact your healthcare provider. Carry this list with you in case of emergency.           Current Medications     aspirin (ECOTRIN) 81 MG EC tablet Take 81 mg by mouth once daily.    BD INSULIN SYRINGE ULTRA-FINE 1/2 mL 31 gauge x 15/64" Syrg USE WITH INSULIN 4 TIMES A DAY    cyanocobalamin, vitamin B-12, (VITAMIN B-12) 5,000 mcg Subl Place 1 tablet under the tongue once daily.    ferrous sulfate 325 (65 FE) MG EC tablet Take 325 mg by mouth once daily.     gemfibrozil (LOPID) 600 MG tablet TAKE 1 TABLET BY MOUTH TWICE A DAY    hydrocodone-acetaminophen 10-325mg (NORCO)  mg Tab Starting on Apr 14, 2017. Take 1 tablet by mouth every 6 (six) hours as needed.    insulin lispro (HUMALOG) 100 unit/mL injection 7-10-12 + correction    lancets (ONE TOUCH DELICA LANCETS) Misc Ultra 2 lancets to check blood sugar BID    LANTUS SOLOSTAR 100 unit/mL (3 mL) InPn pen INJECT 19 UNITS INTO THE SKIN EVERY EVENING.    levothyroxine (SYNTHROID) 75 MCG tablet Take 1 tablet (75 mcg total) by mouth once daily.    lidocaine (LIDODERM) 5 % APPLY 1 PATCH TO SKIN 12 HOURS ON 12 HOURS OFF    magnesium oxide (MAG-OX) 400 mg tablet TAKE 1 TABLET (400 MG TOTAL) BY MOUTH 2 (TWO) TIMES DAILY.    memantine (NAMENDA) 10 MG Tab Take 1 tablet (10 mg total) by mouth 2 (two) times daily.    methocarbamol (ROBAXIN) 750 MG Tab TAKE 1-2 TABLETS BY MOUTH 4 TIMES DAILY AS NEEDED    multivitamin with minerals tablet Take 1 tablet by mouth once daily.    oxybutynin (DITROPAN-XL) 10 MG 24 hr tablet Take 1 tablet (10 mg total) by mouth once daily.    pravastatin (PRAVACHOL) 40 MG tablet TAKE 1 TABLET BY MOUTH EVERY DAY    sodium bicarbonate 650 MG tablet Take 1 tablet (650 mg total) by mouth 3 (three) times daily.    sumatriptan (IMITREX) 100 MG tablet Take 1 tablet (100 mg total) by mouth 2 (two) times daily as needed for Migraine. " "   topiramate (TOPAMAX) 100 MG tablet TAKE 1 TABLET (100 MG TOTAL) BY MOUTH 2 (TWO) TIMES DAILY.    trazodone (DESYREL) 100 MG tablet TAKE 1 TABLET BY MOUTH AT BEDTIME MAY TAKE AN EXTRA HALF TABLET IF NEEDED    venlafaxine (EFFEXOR-XR) 75 MG 24 hr capsule TAKE 2 CAPSULES BY MOUTH ONCE DAILY    VITAMIN D2 50,000 unit capsule TAKE 1 CAPSULE (50,000 UNITS TOTAL) BY MOUTH EVERY 7 DAYS.    scopolamine (TRANSDERM-SCOP) 1.5 mg (1 mg over 3 days) Place 1 patch (1.5 mg total) onto the skin every 72 hours.           Clinical Reference Information           Your Vitals Were     BP Pulse Resp Height Weight BMI    137/75 (BP Location: Right arm, Patient Position: Sitting, BP Method: Automatic) 88 16 5' 8" (1.727 m) 134.3 kg (296 lb) 45.01 kg/m2      Blood Pressure          Most Recent Value    BP  137/75      Allergies as of 4/12/2017     No Known Allergies      Immunizations Administered on Date of Encounter - 4/12/2017     None      Language Assistance Services     ATTENTION: Language assistance services are available, free of charge. Please call [phone].      ATENCIÓN: Si mariselala phong, tiene a chinchilla disposición servicios gratuitos de asistencia lingüística. Llame al [phone].     CHARLI Ý: N?u b?n nói Ti?ng Vi?t, có các d?ch v? h? tr? ngôn ng? mi?n phí dành cho b?n. G?i s? [phone].         [facility] complies with applicable Federal civil rights laws and does not discriminate on the basis of race, color, national origin, age, disability, or sex.        "

## 2017-04-13 RX ORDER — ERGOCALCIFEROL 1.25 MG/1
CAPSULE ORAL
Qty: 4 CAPSULE | Refills: 2 | Status: SHIPPED | OUTPATIENT
Start: 2017-04-13 | End: 2017-07-10 | Stop reason: SDUPTHER

## 2017-04-13 RX ORDER — ERGOCALCIFEROL 1.25 MG/1
CAPSULE ORAL
Qty: 4 CAPSULE | Refills: 3 | Status: SHIPPED | OUTPATIENT
Start: 2017-04-13 | End: 2017-05-11

## 2017-04-17 RX ORDER — SODIUM BICARBONATE 650 MG/1
TABLET ORAL
Qty: 90 TABLET | Refills: 3 | Status: SHIPPED | OUTPATIENT
Start: 2017-04-17 | End: 2017-08-13 | Stop reason: SDUPTHER

## 2017-04-19 RX ORDER — VENLAFAXINE HYDROCHLORIDE 75 MG/1
CAPSULE, EXTENDED RELEASE ORAL
Qty: 60 CAPSULE | Refills: 5 | Status: SHIPPED | OUTPATIENT
Start: 2017-04-19 | End: 2017-05-12 | Stop reason: SDUPTHER

## 2017-05-07 ENCOUNTER — PATIENT MESSAGE (OUTPATIENT)
Dept: INTERNAL MEDICINE | Facility: CLINIC | Age: 67
End: 2017-05-07

## 2017-05-07 DIAGNOSIS — R51.9 NONINTRACTABLE HEADACHE, UNSPECIFIED CHRONICITY PATTERN, UNSPECIFIED HEADACHE TYPE: Primary | ICD-10-CM

## 2017-05-11 ENCOUNTER — OFFICE VISIT (OUTPATIENT)
Dept: ENDOCRINOLOGY | Facility: CLINIC | Age: 67
End: 2017-05-11
Payer: COMMERCIAL

## 2017-05-11 VITALS
BODY MASS INDEX: 44.41 KG/M2 | SYSTOLIC BLOOD PRESSURE: 128 MMHG | HEART RATE: 79 BPM | WEIGHT: 293 LBS | HEIGHT: 68 IN | DIASTOLIC BLOOD PRESSURE: 62 MMHG

## 2017-05-11 DIAGNOSIS — N18.4 CKD (CHRONIC KIDNEY DISEASE), STAGE IV: ICD-10-CM

## 2017-05-11 DIAGNOSIS — E78.5 HYPERLIPIDEMIA, UNSPECIFIED HYPERLIPIDEMIA TYPE: ICD-10-CM

## 2017-05-11 DIAGNOSIS — I10 ESSENTIAL HYPERTENSION: ICD-10-CM

## 2017-05-11 DIAGNOSIS — E03.9 ACQUIRED HYPOTHYROIDISM: ICD-10-CM

## 2017-05-11 DIAGNOSIS — E66.01 MORBID OBESITY DUE TO EXCESS CALORIES: ICD-10-CM

## 2017-05-11 DIAGNOSIS — E55.9 VITAMIN D DEFICIENCY DISEASE: ICD-10-CM

## 2017-05-11 PROCEDURE — 3074F SYST BP LT 130 MM HG: CPT | Mod: S$GLB,,, | Performed by: NURSE PRACTITIONER

## 2017-05-11 PROCEDURE — 3078F DIAST BP <80 MM HG: CPT | Mod: S$GLB,,, | Performed by: NURSE PRACTITIONER

## 2017-05-11 PROCEDURE — 3066F NEPHROPATHY DOC TX: CPT | Mod: S$GLB,,, | Performed by: NURSE PRACTITIONER

## 2017-05-11 PROCEDURE — 99999 PR PBB SHADOW E&M-EST. PATIENT-LVL IV: CPT | Mod: PBBFAC,,, | Performed by: NURSE PRACTITIONER

## 2017-05-11 PROCEDURE — 3045F PR MOST RECENT HEMOGLOBIN A1C LEVEL 7.0-9.0%: CPT | Mod: S$GLB,,, | Performed by: NURSE PRACTITIONER

## 2017-05-11 PROCEDURE — 99214 OFFICE O/P EST MOD 30 MIN: CPT | Mod: S$GLB,,, | Performed by: NURSE PRACTITIONER

## 2017-05-11 PROCEDURE — 1160F RVW MEDS BY RX/DR IN RCRD: CPT | Mod: S$GLB,,, | Performed by: NURSE PRACTITIONER

## 2017-05-11 RX ORDER — LEVOTHYROXINE SODIUM 50 UG/1
50 TABLET ORAL DAILY
Qty: 30 TABLET | Refills: 11 | Status: SHIPPED | OUTPATIENT
Start: 2017-05-11 | End: 2017-11-28 | Stop reason: SDUPTHER

## 2017-05-11 NOTE — MR AVS SNAPSHOT
[facility]  [address]  [facility] [address]  Phone: [phone]                  Cecile Bowen   2017 3:00 PM   Office Visit    Description:  Female : 1950   Provider:  Sumaya Dwyer DNP, NP   Department:  [facility] - Endocrinology           Reason for Visit     Diabetes Mellitus           Diagnoses this Visit        Comments    Uncontrolled type 2 diabetes mellitus with stage 4 chronic kidney disease, with long-term current use of insulin    -  Primary            To Do List           Future Appointments        Provider Department Dept Phone    2017 2:20 PM Enrique Henao MD [facility] - Neurology [phone]    2017 2:20 PM Thor Garvey MD [facility] - Rheumatology [phone]    2017 7:15 AM LAB, SAME DAY [facility] [phone]    2017 7:30 AM SPECIMEN, MAIN CAMPUS [facility] [phone]    2017 1:40 PM Michael López MD [place] - Nephrology [phone]      Goals (5 Years of Data)     None       These Medications        Disp Refills Start End    levothyroxine (SYNTHROID) 50 MCG tablet 30 tablet 11 2017    Take 1 tablet (50 mcg total) by mouth once daily. - Oral    Pharmacy: [facility] #[id number] - [place], [place] - [address]. Ph #: [phone]         [facility]     [facility] -  Assistance  Unless otherwise directed by your provider, please contact [facility], our nurse care line that is available for  assistance.     Registered nurses in the [facility] provide: appointment scheduling, clinical advisement, health education, and other advisory services.  Call: [phone] (toll free)               Medications           Message regarding Medications     Verify the changes and/or additions to your medication regime listed below are the same as discussed with your clinician today.  If any of these changes  "or additions are incorrect, please notify your healthcare provider.        START taking these NEW medications        Refills    levothyroxine (SYNTHROID) 50 MCG tablet 11    Sig: Take 1 tablet (50 mcg total) by mouth once daily.    Class: Normal    Route: Oral           Verify that the below list of medications is an accurate representation of the medications you are currently taking.  If none reported, the list may be blank. If incorrect, please contact your healthcare provider. Carry this list with you in case of emergency.           Current Medications     aspirin (ECOTRIN) 81 MG EC tablet Take 81 mg by mouth once daily.    BD INSULIN SYRINGE ULTRA-FINE 1/2 mL 31 gauge x 15/64" Syrg USE WITH INSULIN 4 TIMES A DAY    cyanocobalamin, vitamin B-12, (VITAMIN B-12) 5,000 mcg Subl Place 1 tablet under the tongue once daily.    ferrous sulfate 325 (65 FE) MG EC tablet Take 325 mg by mouth once daily.     gemfibrozil (LOPID) 600 MG tablet TAKE 1 TABLET BY MOUTH TWICE A DAY    insulin lispro (HUMALOG) 100 unit/mL injection 7-10-12 + correction    lancets (ONE TOUCH DELICA LANCETS) Misc Ultra 2 lancets to check blood sugar BID    LANTUS SOLOSTAR 100 unit/mL (3 mL) InPn pen INJECT 19 UNITS INTO THE SKIN EVERY EVENING.    magnesium oxide (MAG-OX) 400 mg tablet TAKE 1 TABLET (400 MG TOTAL) BY MOUTH 2 (TWO) TIMES DAILY.    memantine (NAMENDA) 10 MG Tab Take 1 tablet (10 mg total) by mouth 2 (two) times daily.    methocarbamol (ROBAXIN) 750 MG Tab TAKE 1-2 TABLETS BY MOUTH 4 TIMES DAILY AS NEEDED    multivitamin with minerals tablet Take 1 tablet by mouth once daily.    oxybutynin (DITROPAN-XL) 10 MG 24 hr tablet Take 1 tablet (10 mg total) by mouth once daily.    pravastatin (PRAVACHOL) 40 MG tablet TAKE 1 TABLET BY MOUTH EVERY DAY    scopolamine (TRANSDERM-SCOP) 1.5 mg (1 mg over 3 days) Place 1 patch (1.5 mg total) onto the skin every 72 hours.    sodium bicarbonate 650 MG tablet TAKE 1 TABLET (650 MG TOTAL) BY MOUTH 3 " "(THREE) TIMES DAILY.    sumatriptan (IMITREX) 100 MG tablet Take 1 tablet (100 mg total) by mouth 2 (two) times daily as needed for Migraine.    topiramate (TOPAMAX) 100 MG tablet TAKE 1 TABLET (100 MG TOTAL) BY MOUTH 2 (TWO) TIMES DAILY.    trazodone (DESYREL) 100 MG tablet TAKE 1 TABLET BY MOUTH AT BEDTIME MAY TAKE AN EXTRA HALF TABLET IF NEEDED    venlafaxine (EFFEXOR-XR) 75 MG 24 hr capsule TAKE 2 CAPSULES BY MOUTH ONCE DAILY    hydrocodone-acetaminophen 10-325mg (NORCO)  mg Tab Take 1 tablet by mouth every 6 (six) hours as needed.    levothyroxine (SYNTHROID) 50 MCG tablet Take 1 tablet (50 mcg total) by mouth once daily.    lidocaine (LIDODERM) 5 % APPLY 1 PATCH TO SKIN 12 HOURS ON 12 HOURS OFF    VITAMIN D2 50,000 unit capsule TAKE 1 CAPSULE (50,000 UNITS TOTAL) BY MOUTH EVERY 7 DAYS.           Clinical Reference Information           Your Vitals Were     BP Pulse Height Weight BMI    128/62 79 5' 8" (1.727 m) 134.7 kg (297 lb) 45.16 kg/m2      Blood Pressure          Most Recent Value    BP  128/62      Allergies as of 5/11/2017     No Known Allergies      Immunizations Administered on Date of Encounter - 5/11/2017     None      Orders Placed During Today's Visit     Future Labs/Procedures Expected by Expires    Hemoglobin A1c  8/11/2017 5/11/2018    TSH  8/11/2017 5/11/2018      Language Assistance Services     ATTENTION: Language assistance services are available, free of charge. Please call [phone].      ATENCIÓN: Si alon darling, tiene a chinchilla disposición servicios gratuitos de asistencia lingüística. Llame al [phone].     [facility] Ý: N?u b?n nói Ti?ng Vi?t, có các d?ch v? h? tr? ngôn ng? mi?n phí dành cho b?n. G?i s? [phone].         [facility] complies with applicable Federal civil rights laws and does not discriminate on the basis of race, color, national origin, age, disability, or sex.        "

## 2017-05-11 NOTE — PROGRESS NOTES
CC: This 66 y.o. female presents for management of diabetes mellitus     HPI: She was diagnosed with T2DM in 2006, found on routine bloodwork screening given family hx of DM. Previously tried Actos but not effective.     Brings logs to clinic as below  + hypoglycemia in 60s- rare approx 3 times in past 4 months          DIET/ MEAL PATTERN: Eat 3 meals a day, Breakfast- 2 yogurts or honey bun; Lunch-  Roast beef or ham or PB sandwich or can of soup; Dinner- home cooked or take out. Largest meal- eats out about 4 times a week- order out- pizza, pasta, chinese, burgers  Bedtime snack: jello or honey bun or baby bell and crackers     EXERCISE: no formal exercise, limited r/t back pain.    CURRENT DM MEDS: Lantus Solostar 20 units PM, Humalog vials 7-10-12 + correction    Switching insurances in July and she is switching to [facility]    STANDARDS OF CARE:  Eye exam:  12/2016. No history of retinopathy. Sees Dr Kearns    BMD 12/16- + osteopenia                    ROS:   Gen: good appetite, generalized fatigue and weakness.   Skin: Skin is intact, no rashes .  Eyes: Denies visual disturbances  Resp:   no SOB or WOLFE, no cough  Cardiac: No palpitations, chest pain, denies syncope, weakness, no edema or cyanosis.  GI: No nausea or vomiting, no constipation   /GYN: has suprapubic catheter placed.   PVD: c/o chronic back pain rates 6-9 out of 10 on most days .  MS/Neuro: Denies numbness/ tingling in BLE; migraines x 2 weekly. In scooter  Psych: Denies drug/ETOH abuse, no hx. of eating disorders   Other systems: negative.    PE:  GENERAL: Well developed, well nourished.  PSYCH: AAOx3, appropriate mood and affect, pleasant expression, conversant, appears relaxed, well groomed.   EYES: Conjunctiva, corneas clear, no lid lag, EOM intact.  NECK: Supple, trachea midline  VASCULAR:  1+ edema BLE, brisk capillary refill BUE.  SKIN:   Skin warm and dry.  no acanthosis nigracans.  Feet- footwear appropriate        Hemoglobin A1C   Date Value Ref Range Status   03/30/2017 7.0 (H) 4.5 - 6.2 % Final     Comment:     According to ADA guidelines, hemoglobin A1C <7.0% represents  optimal control in non-pregnant diabetic patients.  Different  metrics may apply to specific populations.   Standards of Medical Care in Diabetes - 2016.  For the purpose of screening for the presence of diabetes:  <5.7%     Consistent with the absence of diabetes  5.7-6.4%  Consistent with increasing risk for diabetes   (prediabetes)  >or=6.5%  Consistent with diabetes  Currently no consensus exists for use of hemoglobin A1C  for diagnosis of diabetes for children.     01/05/2017 6.8 (H) 4.5 - 6.2 % Final     Comment:     According to ADA guidelines, hemoglobin A1C <7.0% represents  optimal control in non-pregnant diabetic patients.  Different  metrics may apply to specific populations.   Standards of Medical Care in Diabetes - 2016.  For the purpose of screening for the presence of diabetes:  <5.7%     Consistent with the absence of diabetes  5.7-6.4%  Consistent with increasing risk for diabetes   (prediabetes)  >or=6.5%  Consistent with diabetes  Currently no consensus exists for use of hemoglobin A1C  for diagnosis of diabetes for children.     09/15/2016 7.2 (H) 4.5 - 6.2 % Final     Comment:     According to ADA guidelines, hemoglobin A1C <7.0% represents  optimal control in non-pregnant diabetic patients.  Different  metrics may apply to specific populations.   Standards of Medical Care in Diabetes - 2016.  For the purpose of screening for the presence of diabetes:  <5.7%     Consistent with the absence of diabetes  5.7-6.4%  Consistent with increasing risk for diabetes   (prediabetes)  >or=6.5%  Consistent with diabetes  Currently no consensus exists for use of hemoglobin A1C  for diagnosis of diabetes for children.       Lab Results   Component Value Date    TSH 0.032 (L) 03/30/2017    TSH 0.032 (L) 03/30/2017       ASSESSMENT and  PLAN:    1. Diabetes type 2,  w ckd 4  -   continue Lantus Solostar 20 units PM, Change Humalog vials to 7-10-12 + correction.       - avoid metformin r/t CKD  - avoid Invokana r/t recurrent UTI  - Takes ASA, statin    2. CKD stage 4 -  avoid hypoglycemia; caution with insulin stacking; following w Dr López in [place]    3. Hypertension- controlled, off meds per PCP    4. Hyperlipidemia -  LDL at goal < 100 - taking pravastatin, takes lopid every other day; LFT's at goal, recheck w RTC    5. Hypothyroidism - Decrease LT4 to 50 mcg qd and repeat TSh w RTC      Lab Results   Component Value Date    TSH 0.032 (L) 03/30/2017    TSH 0.032 (L) 03/30/2017       6. Fibromyalgia - followed by rheumatology      7. Djd (degenerative joint disease) - limits mobility impacting BG; pain may elevate BG readings    8. Vitamin d deficiency disease/osteopenia - on supplement 1 pill in the AM, vitamin d 37, repeat in 6 months    9. Depression - stable on Effexor    10.Morbid Obesity - discussed DM diet, portion control- Body mass index is 45.16 kg/(m^2).

## 2017-05-12 ENCOUNTER — PATIENT MESSAGE (OUTPATIENT)
Dept: UROLOGY | Facility: CLINIC | Age: 67
End: 2017-05-12

## 2017-05-12 RX ORDER — GEMFIBROZIL 600 MG/1
TABLET, FILM COATED ORAL
Qty: 60 TABLET | Refills: 3 | Status: SHIPPED | OUTPATIENT
Start: 2017-05-12 | End: 2017-06-24

## 2017-05-12 RX ORDER — VENLAFAXINE HYDROCHLORIDE 75 MG/1
CAPSULE, EXTENDED RELEASE ORAL
Qty: 60 CAPSULE | Refills: 5 | Status: SHIPPED | OUTPATIENT
Start: 2017-05-12 | End: 2017-11-29 | Stop reason: SDUPTHER

## 2017-05-13 ENCOUNTER — PATIENT MESSAGE (OUTPATIENT)
Dept: RHEUMATOLOGY | Facility: CLINIC | Age: 67
End: 2017-05-13

## 2017-05-15 RX ORDER — HYDROCODONE BITARTRATE AND ACETAMINOPHEN 10; 325 MG/1; MG/1
1 TABLET ORAL EVERY 6 HOURS PRN
Qty: 120 TABLET | Refills: 0 | Status: SHIPPED | OUTPATIENT
Start: 2017-05-15 | End: 2017-05-24 | Stop reason: SDUPTHER

## 2017-05-18 ENCOUNTER — OFFICE VISIT (OUTPATIENT)
Dept: NEUROLOGY | Facility: CLINIC | Age: 67
End: 2017-05-18
Payer: COMMERCIAL

## 2017-05-18 VITALS
HEIGHT: 68 IN | WEIGHT: 293 LBS | HEART RATE: 85 BPM | SYSTOLIC BLOOD PRESSURE: 149 MMHG | BODY MASS INDEX: 44.41 KG/M2 | DIASTOLIC BLOOD PRESSURE: 94 MMHG

## 2017-05-18 DIAGNOSIS — G43.719 INTRACTABLE TRANSFORMED MIGRAINE WITHOUT AURA AND WITHOUT STATUS MIGRAINOSUS: Primary | ICD-10-CM

## 2017-05-18 DIAGNOSIS — G43.909 MIXED MIGRAINE AND MUSCLE CONTRACTION HEADACHE: ICD-10-CM

## 2017-05-18 DIAGNOSIS — G44.209 MIXED MIGRAINE AND MUSCLE CONTRACTION HEADACHE: ICD-10-CM

## 2017-05-18 DIAGNOSIS — G43.711 INTRACTABLE CHRONIC MIGRAINE WITHOUT AURA AND WITH STATUS MIGRAINOSUS: Primary | ICD-10-CM

## 2017-05-18 PROCEDURE — 99213 OFFICE O/P EST LOW 20 MIN: CPT | Mod: S$GLB,,, | Performed by: PSYCHIATRY & NEUROLOGY

## 2017-05-18 PROCEDURE — 3077F SYST BP >= 140 MM HG: CPT | Mod: S$GLB,,, | Performed by: PSYCHIATRY & NEUROLOGY

## 2017-05-18 PROCEDURE — 3080F DIAST BP >= 90 MM HG: CPT | Mod: S$GLB,,, | Performed by: PSYCHIATRY & NEUROLOGY

## 2017-05-18 PROCEDURE — 1160F RVW MEDS BY RX/DR IN RCRD: CPT | Mod: S$GLB,,, | Performed by: PSYCHIATRY & NEUROLOGY

## 2017-05-18 PROCEDURE — 99999 PR PBB SHADOW E&M-EST. PATIENT-LVL III: CPT | Mod: PBBFAC,,, | Performed by: PSYCHIATRY & NEUROLOGY

## 2017-05-18 RX ORDER — DICLOFENAC SODIUM 10 MG/G
2 GEL TOPICAL DAILY PRN
Qty: 1 TUBE | Refills: 5 | Status: ON HOLD | OUTPATIENT
Start: 2017-05-18 | End: 2018-04-20 | Stop reason: CLARIF

## 2017-05-18 RX ORDER — GABAPENTIN 100 MG/1
100 CAPSULE ORAL 2 TIMES DAILY
Qty: 60 CAPSULE | Refills: 11 | Status: SHIPPED | OUTPATIENT
Start: 2017-05-18 | End: 2017-08-23

## 2017-05-18 NOTE — PROGRESS NOTES
Follow up :   Continued daily headache, stopped fioricet, continued relief from imitrex, unable to comply with CPAP due to severe distress caused by objects around her face related to childhood molesation     Headache history:   Age of onset - Teens   Location - Bioccipital goes to crown   Nature of pain - Throbbing   Prodrome - no   Aura - No   Duration of headache - hrs   Time to peak intensity - 1 hr   Pain scale - range of intensity - 7-8/10   Frequency - 4/week , now 5/month   Status Migrainosus history - yes   Time of day of most headaches- anytime   Associated symptoms with the headache:   Meningeal symptoms - photophobia, phonophobia, exercise intolerance +   Nausea/vomitting +   Nasal drainage   Visual blurriness   Pallor/flushing   Dizziness +   Vertigo   Confusion   Impaired concentration +   Pain worsened with physical activity +   Neck pain +   Tension HA:   Location - Bifrontal   Nature of pain - Gripping   Prodrome - no   Aura - No   Duration of headache - hrs   Time to peak intensity - 1 hr   Pain scale - range of intensity - 6/10   Frequency - daily   Time of day of most headaches- morning   Associated symptoms with the headache: none   Headache Triggers: Heat (hot weather, hot baths or showers) , emotional stress +   Weather change +   Other comorbid conditions:   Anxiety - yes   Motion sickness symptom - yes   Treatment history:   Resolution of headache with sleep - yes   Meds tried:   Fioricet - helps   Imitrex - helps   No headache benefit from:  Elavil, effexor, namenda, Mg, robaxin, flexeril, norco, unable to take NSAIDs due to renal failure  topamax - helped with migraine     Headache risk factors:   H/o TBI - no   H/o Meningitis - no   F/h Aneurysms - no   Takes naps during the day   Denies refreshing sleep   Snoring +   Review of Systems   Constitutional: Negative.   Eyes: Negative.   Respiratory: Negative.   Cardiovascular: Negative.   Gastrointestinal: Negative.   Genitourinary: Negative.    Musculoskeletal: LBP +   Skin: Negative.   Neurological:Sleep disturbance + HA   Hematological: Negative.   Psychiatric/Behavioral: Negative.     Objective:    Physical Exam   Constitutional:   She appears well-developed and obese. She is well groomed   HENT:   Head: Atraumatic, oral and nasal mucosa intact   Mallampati - 3   Eyes: Conjunctivae and EOM are normal. Pupils are equal, round, and reactive to light OU   Neck: Neck supple. No thyromegaly present   Cardiovascular: Normal rate and normal heart sounds   No murmur heard   Pulmonary/Chest: Effort normal and breath sounds normal   Musculoskeletal: Normal range of motion. No joint stiffness. No vertebral point tenderness   Skin: Skin is warm and dry   Psychiatric: Normal mood and affect   Neuro exam:   Mental status:   Awake, attentive, Alert, oriented to self, place, year and month   Language function is intact   Naming, repetition and spontaneous meaningful speech expression are intact   Speech fluency is good and speech is clear   Remote and recent memory are good   No findings to suggest executive dysfuntion   Patient has adequate insight   Mood is stable   Cranial Nerves II - XII: intact   Coordination:   No intentional or positional tremor.   Motor:   Normal muscle bulk and symmetry. No fasciculations were noted   No pronator drift   Strength 5/5 shaheed   Sensory: Intact to light touch   Gait: Normal gait. Normal arm swing and turns. Uses cane   Headache Exam:   Bony prominence with tenderness over R parietal   Temples appear symmetric   No V1,V2,V3 distribution allodynia/hyperalgesia shaheed   No facial swelling   No gross TMJ abnormalities   No ptosis   No conj injection   No meningismus   No neck stiffness   No C spine tenderness   No tender points over trapezium   No supraorbital nerve exit point tenderness   No occipital nerve exit point tenderness   No auriculotemporal nerve tenderness   Assessment:       1.  HA (headache) - likely CO2 retention    2.   Obesity    3.  VIJAYA (obstructive sleep apnea)    4.  Occipital neuralgia - improved    5.  Chronic migraine without aura, with intractable migraine, so stated, without mention of status migrainosus + tension HA    6.  Skull lesion - stable for 15 yrs      Headaches multifactorial from migraine, sinus, cervicalgia, TMJ, VIJAYA     Plan:    1, prophylactic medication - Start GBP 100mg bid (low dose 2/2 renal failure) cont topamax 200 mg BID, Effexor. Stop Namenda 10 mg BID.     2, Breakthrough headache - Imitrex, tylenol    3. Voltaren gel for cervicalgia   4. Initiate approval for botox    Patient verbalized understanding to plan. I answered all her questions to her satisfaction.

## 2017-05-18 NOTE — LETTER
May 18, 2017      Lurdes Navarro MD  [address] [facility] [address]           [facility]  [address]  [place] [address]  Phone: [phone]  Fax: [phone]          Patient: Cecile Bowen   MR Number: [id number]   YOB: 1950   Date of Visit: 5/18/2017       Dear Dr. Lurdes Navarro:    Thank you for referring Cecile Bowen to me for evaluation. Attached you will find relevant portions of my assessment and plan of care.    If you have questions, please do not hesitate to call me. I look forward to following Cecile Bowen along with you.    Sincerely,    Enrique Henao MD    Enclosure  CC:  No Recipients    If you would like to receive this communication electronically, please contact [email or web address] or [phone] to request more information on [employer] Link access.    For providers and/or their staff who would like to refer a patient to [facility], please contact us through our one-stop-shop provider referral line, [facility], at [phone].    If you feel you have received this communication in error or would no longer like to receive these types of communications, please e-mail [email or web address]

## 2017-05-22 ENCOUNTER — TELEPHONE (OUTPATIENT)
Dept: INTERNAL MEDICINE | Facility: CLINIC | Age: 67
End: 2017-05-22

## 2017-05-22 DIAGNOSIS — E03.9 HYPOTHYROIDISM, ADULT: ICD-10-CM

## 2017-05-22 DIAGNOSIS — Z00.00 PREVENTATIVE HEALTH CARE: Primary | ICD-10-CM

## 2017-05-22 DIAGNOSIS — E55.9 VITAMIN D DEFICIENCY DISEASE: ICD-10-CM

## 2017-05-22 DIAGNOSIS — E78.5 HYPERLIPIDEMIA, UNSPECIFIED HYPERLIPIDEMIA TYPE: ICD-10-CM

## 2017-05-22 DIAGNOSIS — I10 UNSPECIFIED ESSENTIAL HYPERTENSION: ICD-10-CM

## 2017-05-22 DIAGNOSIS — D50.9 IRON DEFICIENCY ANEMIA, UNSPECIFIED IRON DEFICIENCY ANEMIA TYPE: ICD-10-CM

## 2017-05-22 NOTE — TELEPHONE ENCOUNTER
----- Message from Rosario Dhillon sent at 5/22/2017  3:10 PM CDT -----  Contact: patient-  Doctor appointment and lab have been scheduled.  Please link lab orders to the lab appointment.  Date of doctor appointment:  9-27  Physical or EP:  physical  Date of lab appointment: 9-20   Comments:

## 2017-05-24 ENCOUNTER — OFFICE VISIT (OUTPATIENT)
Dept: RHEUMATOLOGY | Facility: CLINIC | Age: 67
End: 2017-05-24
Payer: COMMERCIAL

## 2017-05-24 VITALS — WEIGHT: 293 LBS | HEIGHT: 68 IN | BODY MASS INDEX: 44.41 KG/M2

## 2017-05-24 DIAGNOSIS — Z79.891 LONG TERM PRESCRIPTION OPIATE USE: ICD-10-CM

## 2017-05-24 DIAGNOSIS — G43.909 MIXED MIGRAINE AND MUSCLE CONTRACTION HEADACHE: ICD-10-CM

## 2017-05-24 DIAGNOSIS — Z74.09 MOBILITY IMPAIRED: ICD-10-CM

## 2017-05-24 DIAGNOSIS — M79.7 FIBROMYALGIA: ICD-10-CM

## 2017-05-24 DIAGNOSIS — G44.209 MIXED MIGRAINE AND MUSCLE CONTRACTION HEADACHE: ICD-10-CM

## 2017-05-24 DIAGNOSIS — M15.9 PRIMARY OSTEOARTHRITIS INVOLVING MULTIPLE JOINTS: Primary | ICD-10-CM

## 2017-05-24 PROCEDURE — 99213 OFFICE O/P EST LOW 20 MIN: CPT | Mod: S$GLB,,, | Performed by: INTERNAL MEDICINE

## 2017-05-24 PROCEDURE — 99999 PR PBB SHADOW E&M-EST. PATIENT-LVL III: CPT | Mod: PBBFAC,,, | Performed by: INTERNAL MEDICINE

## 2017-05-24 RX ORDER — HYDROCODONE BITARTRATE AND ACETAMINOPHEN 10; 325 MG/1; MG/1
1 TABLET ORAL EVERY 6 HOURS PRN
Qty: 120 TABLET | Refills: 0 | Status: SHIPPED | OUTPATIENT
Start: 2017-07-10 | End: 2017-05-24 | Stop reason: SDUPTHER

## 2017-05-24 RX ORDER — HYDROCODONE BITARTRATE AND ACETAMINOPHEN 10; 325 MG/1; MG/1
1 TABLET ORAL EVERY 6 HOURS PRN
Qty: 120 TABLET | Refills: 0 | Status: SHIPPED | OUTPATIENT
Start: 2017-06-12 | End: 2017-05-24 | Stop reason: SDUPTHER

## 2017-05-24 RX ORDER — HYDROCODONE BITARTRATE AND ACETAMINOPHEN 10; 325 MG/1; MG/1
1 TABLET ORAL EVERY 6 HOURS PRN
Qty: 120 TABLET | Refills: 0 | Status: SHIPPED | OUTPATIENT
Start: 2017-08-07 | End: 2017-08-31 | Stop reason: SDUPTHER

## 2017-05-25 ENCOUNTER — OFFICE VISIT (OUTPATIENT)
Dept: UROLOGY | Facility: CLINIC | Age: 67
End: 2017-05-25
Payer: COMMERCIAL

## 2017-05-25 VITALS — HEART RATE: 113 BPM | DIASTOLIC BLOOD PRESSURE: 93 MMHG | SYSTOLIC BLOOD PRESSURE: 142 MMHG

## 2017-05-25 DIAGNOSIS — L92.9 GRANULATION TISSUE: ICD-10-CM

## 2017-05-25 DIAGNOSIS — Z43.5 ENCOUNTER FOR SUPRAPUBIC CATHETER CARE: ICD-10-CM

## 2017-05-25 DIAGNOSIS — R33.9 URINARY RETENTION: ICD-10-CM

## 2017-05-25 DIAGNOSIS — N31.9 NEUROGENIC BLADDER: Primary | ICD-10-CM

## 2017-05-25 DIAGNOSIS — Z43.5 ENCOUNTER FOR CARE OR REPLACEMENT OF SUPRAPUBIC TUBE: ICD-10-CM

## 2017-05-25 PROCEDURE — 17250 CHEM CAUT OF GRANLTJ TISSUE: CPT | Mod: 51,S$GLB,, | Performed by: NURSE PRACTITIONER

## 2017-05-25 PROCEDURE — 51705 CHANGE OF BLADDER TUBE: CPT | Mod: S$GLB,,, | Performed by: NURSE PRACTITIONER

## 2017-05-25 PROCEDURE — 99214 OFFICE O/P EST MOD 30 MIN: CPT | Mod: 25,S$GLB,, | Performed by: NURSE PRACTITIONER

## 2017-05-25 PROCEDURE — 99999 PR PBB SHADOW E&M-EST. PATIENT-LVL III: CPT | Mod: PBBFAC,,, | Performed by: NURSE PRACTITIONER

## 2017-05-25 NOTE — PROGRESS NOTES
Subjective:       Patient ID: Cecile Bowen is a 66 y.o. female.    Chief Complaint: Urinary Retention (Neurogenic Bladder)    Cecile Bowen is a 66 y.o. Female with history of bilateral hydronephrosis, incomplete bladder emptying which is managed by SPT (16fr).  Her last SPT change was 04/12/2017    Today she voices no complaints.  Here today for new SPT.  Good urine flow through SPT.      Last visit with Dr. Whittington was 11/10/2015.      12/10/2015:  Procedure(s) Performed:   1. Cystoscopy with bladder botox injection  2. Silver nitrate to suprapubic catheter site  3 . Change suprapubic catheter tube            Past Medical History:    Diabetes type 2, uncontrolled                   12/12/2012    Obesity                                         12/12/2012    Hypertension                                    12/12/2012    DJD (degenerative joint disease)                12/12/2012    Hypothyroidism                                  12/12/2012    Nuclear sclerosis - Both Eyes                   3/24/2014     Migraine                                                      Past Surgical History:    TOTAL ABDOMINAL HYSTERECTOMY W/ BILATERAL SALP*  1985          GALLBLADDER SURGERY                              2006          TONSILLECTOMY, ADENOIDECTOMY                                   OVARIAN CYST SURGERY                             1985          HYSTERECTOMY                                                   DILATION AND CURETTAGE OF UTERUS                               Review of patient's family history indicates:    Diabetes                       Sister                    Kidney disease                 Sister                    ALS                            Mother                      Comment: d.    Cancer                         Maternal Grandmother        Comment: d. colon    Cancer                         Paternal Grandfather        Comment: d. lung    Diabetes                       Maternal Aunt             Kidney  "disease                 Maternal Aunt             Diabetes                       Maternal Uncle            Amblyopia                      Neg Hx                    Blindness                      Neg Hx                    Cataracts                      Neg Hx                    Glaucoma                       Neg Hx                    Macular degeneration           Neg Hx                    Retinal detachment             Neg Hx                    Strabismus                     Neg Hx                      Social History    Marital Status:             Spouse Name:                       Years of Education:                 Number of children:               Occupational History    None on file    Social History Main Topics    Smoking Status: Never Smoker                      Smokeless Status: Never Used                        Alcohol Use: No              Drug Use: No              Sexual Activity: Not Currently           Birth Control/Protection: Abstinence    Other Topics            Concern    None on file    Social History Narrative    Single      Lives w/ sister  And brother     both are helping her during her post hosp recovery period        Allergies:  Review of patient's allergies indicates no known allergies.    Medications:  Current outpatient prescriptions:amitriptyline (ELAVIL) 10 MG tablet, TAKE 3 TABLETS BY MOUTH IN THE EVENING, Disp: 90 tablet, Rfl: 5;  aspirin (ECOTRIN) 81 MG EC tablet, Take 81 mg by mouth once daily., Disp: , Rfl: ;  BD INSULIN PEN NEEDLE UF SHORT 31 X 5/16 " Ndle, USE ONCE DAILY WITH LANTUS SOLOSTAR PEN INSULIN, Disp: 100 each, Rfl: 11  butalbital-acetaminophen-caffeine -40 mg (FIORICET, ESGIC) -40 mg per tablet, TAKE 1 TABLET EVERY 4 TO 6 HOURS, Disp: 30 tablet, Rfl: 0;  cyanocobalamin, vitamin B-12, (VITAMIN B-12) 2,500 mcg Subl, Place 2,500 mcg under the tongue once daily., Disp: , Rfl: ;  diphenhydrAMINE (BENADRYL) 25 mg capsule, Take 25 mg by mouth every 6 (six) " "hours as needed., Disp: , Rfl:   ferrous sulfate 325 (65 FE) MG EC tablet, Take 325 mg by mouth 3 (three) times daily with meals., Disp: , Rfl: ;  fluticasone (FLONASE) 50 mcg/actuation nasal spray, 1 SPRAY IN EACH NOSTRIL DAILY (Patient taking differently: 1 SPRAY IN EACH NOSTRIL DAILY PRN), Disp: 16 g, Rfl: 1;  furosemide (LASIX) 20 MG tablet, Take 1 tablet (20 mg total) by mouth once daily., Disp: 4 tablet, Rfl: 0  gemfibrozil (LOPID) 600 MG tablet, TAKE 1 TABLET BY MOUTH TWICE A DAY, Disp: 60 tablet, Rfl: 5;  (START ON 4/29/2015) hydrocodone-acetaminophen 10-325mg (NORCO)  mg Tab, Take 1 tablet by mouth every 6 (six) hours as needed., Disp: 120 tablet, Rfl: 0;  insulin lispro (HUMALOG) 100 unit/mL injection, Inject 7 Units into the skin 3 (three) times daily before meals., Disp: 6.3 mL, Rfl: 11  insulin syringe-needle U-100 (BD INSULIN SYRINGE ULTRA-FINE) 1/2 mL 31 x 15/64" Syrg, 1 Box by Misc.(Non-Drug; Combo Route) route 4 (four) times daily., Disp: 100 Syringe, Rfl: 12;  lancets (ONE TOUCH DELICA LANCETS) Misc, Ultra 2 lancets to check blood sugar BID, Disp: 100 each, Rfl: 6;  LANTUS SOLOSTAR 100 unit/mL (3 mL) InPn pen, , Disp: , Rfl: 3  LIDODERM 5 %(700 mg/patch), APPLY 1 PATCH TO SKIN DAILY (LEAVING ON FOR 12 HOURS AND OFF FOR 12 HOURS), Disp: 30 patch, Rfl: 6;  magnesium oxide (MAG-OX) 400 mg tablet, TAKE 1 TABLET (400 MG TOTAL) BY MOUTH 2 (TWO) TIMES DAILY., Disp: 60 tablet, Rfl: 11;  methocarbamol (ROBAXIN) 750 MG Tab, TAKE 1 TO 2 TABLETS BY MOUTH 3 TIMES A DAY (Patient taking differently: Take 2 tablets twice daily), Disp: 120 tablet, Rfl: 3  methocarbamol (ROBAXIN) 750 MG Tab, TAKE 1 TO 2 TABLETS BY MOUTH 3 TIMES A DAY, Disp: 120 tablet, Rfl: 3;  methylPREDNISolone (MEDROL DOSEPACK) 4 mg tablet, use as directed, Disp: 21 tablet, Rfl: 0;  multivitamin with minerals tablet, Take 1 tablet by mouth once daily., Disp: , Rfl: ;  pravastatin (PRAVACHOL) 40 MG tablet, TAKE 1 TABLET BY MOUTH EVERY DAY, " Disp: 30 tablet, Rfl: 2  pyridoxine (B-6) 100 MG Tab, Take 1 tablet (100 mg total) by mouth once daily., Disp: 30 tablet, Rfl: 12;  scopolamine (TRANSDERM-SCOP) 1.5 mg, Place 1 patch (1.5 mg total) onto the skin every 72 hours., Disp: 4 patch, Rfl: 0;  sulfamethoxazole-trimethoprim 800-160mg (BACTRIM DS) 800-160 mg Tab, Take 1 tablet by mouth 2 (two) times daily., Disp: , Rfl: 0  sumatriptan (IMITREX) 100 MG tablet, TAKE 1 TABLET (100 MG TOTAL) BY MOUTH ONCE., Disp: 9 tablet, Rfl: 6;  SYNTHROID 125 mcg tablet, TAKE 1 TABLET BY MOUTH DAILY, Disp: 90 tablet, Rfl: 4;  topiramate (TOPAMAX) 100 MG tablet, TAKE 1 TABLET (100 MG TOTAL) BY MOUTH 2 (TWO) TIMES DAILY., Disp: 60 tablet, Rfl: 11;  topiramate (TOPAMAX) 25 MG tablet, Take 4 tablets (100 mg total) by mouth 2 (two) times daily., Disp: 240 tablet, Rfl: 5  venlafaxine (EFFEXOR-XR) 150 MG Cp24, TAKE 1 CAPSULE BY MOUTH ONCE DAILY, Disp: 30 capsule, Rfl: 2;  vitamin D (VITAMIN D3) 185 MG Tab, Take 185 mg by mouth once daily., Disp: , Rfl:           Review of Systems   Genitourinary: Positive for difficulty urinating.        SPT draining well.     Musculoskeletal: Positive for arthralgias and gait problem.       Objective:      Physical Exam   Nursing note and vitals reviewed.  Constitutional: She is oriented to person, place, and time. Vital signs are normal. She appears well-developed and well-nourished. She is active and cooperative.   HENT:   Head: Normocephalic.   Right Ear: External ear normal.   Left Ear: External ear normal.   Nose: Nose normal.   Eyes: Conjunctivae and lids are normal. Right eye exhibits no discharge. Left eye exhibits no discharge. No scleral icterus.   Neck: Trachea normal and normal range of motion. Neck supple. No tracheal deviation present.   Cardiovascular: Normal rate, regular rhythm and intact distal pulses.    Pulmonary/Chest: Effort normal. No respiratory distress.   Abdominal: Soft. Normal appearance and bowel sounds are normal. She  exhibits no distension and no mass. There is no tenderness. There is no rebound, no guarding and no CVA tenderness.       Musculoskeletal: Normal range of motion. She exhibits no edema.   Neurological: She is alert and oriented to person, place, and time.   Skin: Skin is warm, dry and intact.     Psychiatric: She has a normal mood and affect. Her behavior is normal. Judgment and thought content normal.       Assessment:       1. Neurogenic bladder    2. Urinary retention    3. Encounter for suprapubic catheter care    4. Granulation tissue        Plan:         I spent 30 minutes with the patient of which more than half was spent in direct consultation with the patient in regards to our treatment and plan.    Education and recommendations of today's plan of care including home remedies.  I removed old SPT without difficulty.  I easily replaced her 16fr SPT using sterile precautions.  Irrigated SPT/bladder to verify the position  Balloon filled with 8cc sterile water.  Using sterile scissors I removed excess granulation tissue; tissue cauterized with 3 silver nitrate sticks.   Tolerated well; dsg applied.  RTC one month

## 2017-05-25 NOTE — PROGRESS NOTES
History of present illness: 66-year-old female had been following since prior to 2004. She has chronic pain secondary to osteoarthritis and fibromyalgia. She had been on anti-inflammatory medicines in the past but this was stopped 2 years ago when she developed renal insufficiency. She has been on long-term narcotic medications.  She remains on methocarbamol.  She is now using a scooter.  Her aching has been stable.  Her headaches have improved.  She is being scheduled for Botox injections.  She started on gabapentin.  She has been using the Lidoderm patch.  She continues the hydrocodone and has not gotten the prescriptions filled early.  She denies other recent medical problems.    Physical examination was not performed, the entire time was counseling.    Assessment: Osteoarthritis    Plans: I refilled her hydrocodone for the next 3 months.  I will see her in follow-up in 3 months.

## 2017-05-26 ENCOUNTER — LAB VISIT (OUTPATIENT)
Dept: LAB | Facility: HOSPITAL | Age: 67
End: 2017-05-26
Attending: INTERNAL MEDICINE
Payer: COMMERCIAL

## 2017-05-26 DIAGNOSIS — N18.4 CKD (CHRONIC KIDNEY DISEASE), STAGE IV: ICD-10-CM

## 2017-05-26 LAB
ALBUMIN SERPL BCP-MCNC: 3.1 G/DL
ANION GAP SERPL CALC-SCNC: 9 MMOL/L
BASOPHILS # BLD AUTO: 0.03 K/UL
BASOPHILS NFR BLD: 0.4 %
BUN SERPL-MCNC: 27 MG/DL
CALCIUM SERPL-MCNC: 9.2 MG/DL
CHLORIDE SERPL-SCNC: 107 MMOL/L
CO2 SERPL-SCNC: 20 MMOL/L
CREAT SERPL-MCNC: 2 MG/DL
DIFFERENTIAL METHOD: NORMAL
EOSINOPHIL # BLD AUTO: 0.2 K/UL
EOSINOPHIL NFR BLD: 2.9 %
ERYTHROCYTE [DISTWIDTH] IN BLOOD BY AUTOMATED COUNT: 13 %
EST. GFR  (AFRICAN AMERICAN): 29.8 ML/MIN/1.73 M^2
EST. GFR  (NON AFRICAN AMERICAN): 25.8 ML/MIN/1.73 M^2
GLUCOSE SERPL-MCNC: 168 MG/DL
HCT VFR BLD AUTO: 39.6 %
HGB BLD-MCNC: 12.9 G/DL
LYMPHOCYTES # BLD AUTO: 1.6 K/UL
LYMPHOCYTES NFR BLD: 23.5 %
MCH RBC QN AUTO: 29.5 PG
MCHC RBC AUTO-ENTMCNC: 32.6 %
MCV RBC AUTO: 90 FL
MONOCYTES # BLD AUTO: 0.7 K/UL
MONOCYTES NFR BLD: 9.7 %
NEUTROPHILS # BLD AUTO: 4.4 K/UL
NEUTROPHILS NFR BLD: 63.1 %
PHOSPHATE SERPL-MCNC: 3.5 MG/DL
PLATELET # BLD AUTO: 316 K/UL
PMV BLD AUTO: 9.5 FL
POTASSIUM SERPL-SCNC: 4.1 MMOL/L
PTH-INTACT SERPL-MCNC: 105 PG/ML
RBC # BLD AUTO: 4.38 M/UL
SODIUM SERPL-SCNC: 136 MMOL/L
WBC # BLD AUTO: 6.9 K/UL

## 2017-05-26 PROCEDURE — 80069 RENAL FUNCTION PANEL: CPT

## 2017-05-26 PROCEDURE — 83970 ASSAY OF PARATHORMONE: CPT

## 2017-05-26 PROCEDURE — 36415 COLL VENOUS BLD VENIPUNCTURE: CPT

## 2017-05-26 PROCEDURE — 85025 COMPLETE CBC W/AUTO DIFF WBC: CPT

## 2017-05-29 RX ORDER — METHOCARBAMOL 750 MG/1
TABLET, FILM COATED ORAL
Qty: 180 TABLET | Refills: 6 | Status: SHIPPED | OUTPATIENT
Start: 2017-05-29 | End: 2017-10-16 | Stop reason: SDUPTHER

## 2017-06-02 ENCOUNTER — OFFICE VISIT (OUTPATIENT)
Dept: NEPHROLOGY | Facility: CLINIC | Age: 67
End: 2017-06-02
Payer: COMMERCIAL

## 2017-06-02 VITALS
BODY MASS INDEX: 44.41 KG/M2 | WEIGHT: 293 LBS | HEIGHT: 68 IN | SYSTOLIC BLOOD PRESSURE: 134 MMHG | DIASTOLIC BLOOD PRESSURE: 76 MMHG | HEART RATE: 96 BPM

## 2017-06-02 DIAGNOSIS — N25.81 SECONDARY HYPERPARATHYROIDISM, RENAL: ICD-10-CM

## 2017-06-02 DIAGNOSIS — I10 ESSENTIAL HYPERTENSION: ICD-10-CM

## 2017-06-02 DIAGNOSIS — N18.4 CKD (CHRONIC KIDNEY DISEASE), STAGE IV: Primary | ICD-10-CM

## 2017-06-02 DIAGNOSIS — E87.20 METABOLIC ACIDOSIS: ICD-10-CM

## 2017-06-02 DIAGNOSIS — D50.9 IRON DEFICIENCY ANEMIA, UNSPECIFIED IRON DEFICIENCY ANEMIA TYPE: ICD-10-CM

## 2017-06-02 DIAGNOSIS — G47.33 OSA (OBSTRUCTIVE SLEEP APNEA): ICD-10-CM

## 2017-06-02 DIAGNOSIS — E66.01 MORBID OBESITY DUE TO EXCESS CALORIES: ICD-10-CM

## 2017-06-02 DIAGNOSIS — Z93.59 CHRONIC SUPRAPUBIC CATHETER: ICD-10-CM

## 2017-06-02 PROCEDURE — 99214 OFFICE O/P EST MOD 30 MIN: CPT | Mod: S$GLB,,, | Performed by: INTERNAL MEDICINE

## 2017-06-02 PROCEDURE — 99999 PR PBB SHADOW E&M-EST. PATIENT-LVL II: CPT | Mod: PBBFAC,,, | Performed by: INTERNAL MEDICINE

## 2017-06-06 NOTE — PROGRESS NOTES
Subjective:       Patient ID: Cecile Bowen is a 66 y.o. White female who presents for follow-up evaluation of Chronic Kidney Disease    HPI     She reports she is doing well. Her last Hba1c was 7. No new medications although she saw a new neurologist and she will have botox for migraines once insurance approves. No edema nor SOB but mobility is limited. No foamy urine, no hematuria.     Review of Systems   Constitutional: Negative for activity change, appetite change and fever.   HENT: Negative for facial swelling.    Respiratory: Negative for shortness of breath.    Cardiovascular: Negative for chest pain and leg swelling.   Gastrointestinal: Negative for constipation and diarrhea.   Genitourinary: Negative for difficulty urinating and dysuria.   Musculoskeletal: Positive for arthralgias (no NSAID use) and back pain.   Neurological: Negative for weakness.       Objective:      Physical Exam   Constitutional: She is oriented to person, place, and time. She appears well-nourished.   HENT:   Mouth/Throat: Oropharynx is clear and moist.   Neck: No JVD present.   Cardiovascular: S1 normal and S2 normal.  Exam reveals no friction rub.    Pulmonary/Chest: Breath sounds normal. She has no wheezes. She has no rales.   Abdominal: Soft.   Musculoskeletal: She exhibits no edema.   Neurological: She is alert and oriented to person, place, and time.   Skin: Skin is warm and dry.   Psychiatric: She has a normal mood and affect.   Vitals reviewed.      Assessment:       1. CKD (chronic kidney disease), stage IV    2. Essential hypertension    3. Metabolic acidosis    4. Chronic suprapubic catheter    5. Secondary hyperparathyroidism, renal    6. Iron deficiency anemia, unspecified iron deficiency anemia type    7. Morbid obesity due to excess calories    8. VIJAYA (obstructive sleep apnea)        Plan:             CKD stage 4 with stable kidney function and proteinuria <1gram. If worsens will add a RAAS blcoker    HTN is  controlled    MBD--no change in therapy    DM--controlled    Morbid Obesity--encouraged weight loss      RTC 4 months with labs prior at main [place]

## 2017-06-15 ENCOUNTER — OFFICE VISIT (OUTPATIENT)
Dept: NEUROLOGY | Facility: CLINIC | Age: 67
End: 2017-06-15
Payer: COMMERCIAL

## 2017-06-15 VITALS — HEIGHT: 68 IN | DIASTOLIC BLOOD PRESSURE: 86 MMHG | HEART RATE: 98 BPM | SYSTOLIC BLOOD PRESSURE: 131 MMHG

## 2017-06-15 DIAGNOSIS — R51.9 CHRONIC DAILY HEADACHE: ICD-10-CM

## 2017-06-15 DIAGNOSIS — M85.80 OSTEOPENIA, UNSPECIFIED LOCATION: ICD-10-CM

## 2017-06-15 DIAGNOSIS — N18.4 CKD (CHRONIC KIDNEY DISEASE), STAGE IV: ICD-10-CM

## 2017-06-15 DIAGNOSIS — I10 ESSENTIAL HYPERTENSION: ICD-10-CM

## 2017-06-15 DIAGNOSIS — G43.709 CHRONIC MIGRAINE WITHOUT AURA, NOT INTRACTABLE, WITHOUT STATUS MIGRAINOSUS: Primary | ICD-10-CM

## 2017-06-15 DIAGNOSIS — G47.33 OSA (OBSTRUCTIVE SLEEP APNEA): ICD-10-CM

## 2017-06-15 PROCEDURE — 99214 OFFICE O/P EST MOD 30 MIN: CPT | Mod: S$GLB,,, | Performed by: NURSE PRACTITIONER

## 2017-06-15 PROCEDURE — 99999 PR PBB SHADOW E&M-EST. PATIENT-LVL III: CPT | Mod: PBBFAC,,, | Performed by: NURSE PRACTITIONER

## 2017-06-15 PROCEDURE — 3045F PR MOST RECENT HEMOGLOBIN A1C LEVEL 7.0-9.0%: CPT | Mod: S$GLB,,, | Performed by: NURSE PRACTITIONER

## 2017-06-15 PROCEDURE — 3066F NEPHROPATHY DOC TX: CPT | Mod: S$GLB,,, | Performed by: NURSE PRACTITIONER

## 2017-06-15 NOTE — LETTER
Zaynab 15, 2017      Enrique Henao MD  [address] [facility] [address]           [facility]  [address]  [place] [address]  Phone: [phone]  Fax: [phone]          Patient: Cecile Bowen   MR Number: [id number]   YOB: 1950   Date of Visit: 6/15/2017       Dear Dr. Enrique Henao:    Thank you for referring Cecile Bowen to me for evaluation. Attached you will find relevant portions of my assessment and plan of care.    If you have questions, please do not hesitate to call me. I look forward to following Cecile Bowen along with you.    Sincerely,    Chantelle Alexandre, [facility]    Enclosure  CC:  No Recipients    If you would like to receive this communication electronically, please contact [email or web address] or [phone] to request more information on [employer] Link access.    For providers and/or their staff who would like to refer a patient to [facility], please contact us through our one-stop-shop provider referral line, [facility], at [phone].    If you feel you have received this communication in error or would no longer like to receive these types of communications, please e-mail [email or web address]

## 2017-06-15 NOTE — PROGRESS NOTES
Established Patient   SUBJECTIVE:  Patient ID: Cecile Bowen is a 66 y.o. female.  Chief Complaint: Consult    History of Present Illness:  Cecile Bowen is a 66 y.o. female with VIJAYA, MDD, HTN, stage IV CKD, obesity, chronic back pain, chronic opiate use, and osteopenia who presents to the clinic alone for follow-up of chronic migraines to discuss Botox injections.     HA History:  Age of onset - Teens   Location - Bioccipital goes to crown   Nature of pain - Throbbing   Prodrome - no   Aura - No   Duration of headache - hrs   Time to peak intensity - 1 hr   Pain scale - range of intensity - 7-8/10   Frequency - 4/week , now 5/month   Status Migrainosus history - yes   Time of day of most headaches- anytime   Associated symptoms with the headache:   Meningeal symptoms - photophobia, phonophobia, exercise intolerance +   Nausea/vomitting +   Nasal drainage   Visual blurriness   Pallor/flushing   Dizziness +   Vertigo   Confusion   Impaired concentration +   Pain worsened with physical activity +   Neck pain +   Tension HA:   Location - Bifrontal   Nature of pain - Gripping   Prodrome - no   Aura - No   Duration of headache - hrs   Time to peak intensity - 1 hr   Pain scale - range of intensity - 6/10   Frequency - daily   Time of day of most headaches- morning   Associated symptoms with the headache: none   Headache Triggers: Heat (hot weather, hot baths or showers) , emotional stress +   Weather change +   Other comorbid conditions:   Anxiety - yes   Motion sickness symptom - yes     HD/month - 26/30  Current daily regimen includes--  Topamax 100mg BID, Mg Oxide 400mg BID, Gabapentin 100mg BID, Robaxin 750 mg TID, Effexor XR 75 mg  Abortives - Sumatriptan 100 mg     Therapies Tried & Response:  Topiramate 100 mg BID - helping  Mg Oxide - 400 mg BID - some relief   Sumatriptan - 100 mg - some relief    Gabapentin - not really helping  Fioricet - did not help migraines,   Amitriptyline - no   Flexeril -  "no  Robaxin - helps   Namenda 10 mg - no  Hydrocodone - helps back pain  Zanaflex - no help   Effexor - helps stabilize emotions, no effect on headache     Current Medications:    aspirin (ECOTRIN) 81 MG EC tablet, Take 81 mg by mouth once daily., Disp: , Rfl:     BD INSULIN SYRINGE ULTRA-FINE 1/2 mL 31 gauge x 15/64" Syrg, USE WITH INSULIN 4 TIMES A DAY, Disp: 100 Syringe, Rfl: 4    cyanocobalamin, vitamin B-12, (VITAMIN B-12) 5,000 mcg Subl, Place 1 tablet under the tongue once daily., Disp: , Rfl:     ferrous sulfate 325 (65 FE) MG EC tablet, Take 325 mg by mouth once daily. , Disp: , Rfl:     gabapentin (NEURONTIN) 100 MG capsule, Take 1 capsule (100 mg total) by mouth 2 (two) times daily., Disp: 60 capsule, Rfl: 11    gemfibrozil (LOPID) 600 MG tablet, TAKE 1 TABLET BY MOUTH TWICE A DAY (Patient taking differently: Patient takes one tablet every other day), Disp: 60 tablet, Rfl: 3    gemfibrozil (LOPID) 600 MG tablet, TAKE 1 TABLET BY MOUTH TWICE A DAY, Disp: 60 tablet, Rfl: 3    [START ON 8/7/2017] hydrocodone-acetaminophen 10-325mg (NORCO)  mg Tab, Take 1 tablet by mouth every 6 (six) hours as needed., Disp: 120 tablet, Rfl: 0    insulin lispro (HUMALOG) 100 unit/mL injection, 7-10-12 + correction, Disp: 10 mL, Rfl: 11    lancets (ONE TOUCH DELICA LANCETS) Misc, Ultra 2 lancets to check blood sugar BID, Disp: 100 each, Rfl: 6    LANTUS SOLOSTAR 100 unit/mL (3 mL) InPn pen, INJECT 19 UNITS INTO THE SKIN EVERY EVENING. (Patient taking differently: INJECT 19 UNITS INTO THE SKIN EVERY EVENING.--Patient using 20 units in the pm), Disp: 15 Syringe, Rfl: 3    levothyroxine (SYNTHROID) 50 MCG tablet, Take 1 tablet (50 mcg total) by mouth once daily., Disp: 30 tablet, Rfl: 11    lidocaine (LIDODERM) 5 %, APPLY 1 PATCH TO SKIN 12 HOURS ON 12 HOURS OFF, Disp: 30 patch, Rfl: 6    magnesium oxide (MAG-OX) 400 mg tablet, TAKE 1 TABLET (400 MG TOTAL) BY MOUTH 2 (TWO) TIMES DAILY., Disp: 60 tablet, Rfl: " "11    memantine (NAMENDA) 10 MG Tab, Take 1 tablet (10 mg total) by mouth 2 (two) times daily., Disp: 60 tablet, Rfl: 11    methocarbamol (ROBAXIN) 750 MG Tab, TAKE 1-2 TABLETS BY MOUTH 4 TIMES DAILY AS NEEDED, Disp: 180 tablet, Rfl: 6    multivitamin with minerals tablet, Take 1 tablet by mouth once daily., Disp: , Rfl:     oxybutynin (DITROPAN-XL) 10 MG 24 hr tablet, Take 1 tablet (10 mg total) by mouth once daily., Disp: 30 tablet, Rfl: 12    pravastatin (PRAVACHOL) 40 MG tablet, TAKE 1 TABLET BY MOUTH EVERY DAY, Disp: 30 tablet, Rfl: 5    scopolamine (TRANSDERM-SCOP) 1.5 mg (1 mg over 3 days), Place 1 patch (1.5 mg total) onto the skin every 72 hours. (Patient taking differently: Place 1 patch onto the skin every 72 hours. Only on cruise), Disp: 4 patch, Rfl: 0    sodium bicarbonate 650 MG tablet, TAKE 1 TABLET (650 MG TOTAL) BY MOUTH 3 (THREE) TIMES DAILY., Disp: 90 tablet, Rfl: 3    sumatriptan (IMITREX) 100 MG tablet, Take 1 tablet (100 mg total) by mouth 2 (two) times daily as needed for Migraine., Disp: 9 tablet, Rfl: 6    topiramate (TOPAMAX) 100 MG tablet, TAKE 1 TABLET (100 MG TOTAL) BY MOUTH 2 (TWO) TIMES DAILY. (Patient taking differently: TAKE 2 TABLET (200 MG TOTAL) BY MOUTH 2 (TWO) TIMES DAILY.), Disp: 60 tablet, Rfl: 11    trazodone (DESYREL) 100 MG tablet, TAKE 1 TABLET BY MOUTH AT BEDTIME MAY TAKE AN EXTRA HALF TABLET IF NEEDED, Disp: 45 tablet, Rfl: 5    venlafaxine (EFFEXOR-XR) 75 MG 24 hr capsule, TAKE 2 CAPSULES BY MOUTH ONCE DAILY, Disp: 60 capsule, Rfl: 5    VITAMIN D2 50,000 unit capsule, TAKE 1 CAPSULE (50,000 UNITS TOTAL) BY MOUTH EVERY 7 DAYS., Disp: 4 capsule, Rfl: 2    diclofenac sodium 1 % Gel, Apply 2 g topically daily as needed., Disp: 1 Tube, Rfl: 5    OBJECTIVE:  Vitals:  /86   Pulse 98   Ht 5' 8" (1.727 m)     Physical Exam Deferred - she is established with Dr. Henao, was referred for Botox injections only.      ASSESSMENT:  1. Chronic migraine without " aura, not intractable, without status migrainosus    2. Chronic daily headache    3. Osteopenia, unspecified location    4. CKD (chronic kidney disease), stage IV    5. Uncontrolled type 2 diabetes mellitus with stage 4 chronic kidney disease, with long-term current use of insulin    6. Essential hypertension    7. VIJAYA (obstructive sleep apnea)      Medical Decision Making:  Cecile is a 66 year old female who presents for evaluation for Botox injections for Chronic Migraine.  She is currently experiencing 26/30 headache days per month despite being on multiple migraine prevention medications including topiramate, effexor, gabapentin, namenda, robaxin, and trazodone.  Utilizes sumatriptan for abortive which is effective, but runs out of medication monthly, due frequency of migraines.  She has tried and failed other preventives listed above.  I feel Cecile would benefit from Botox injections for Chronic Migraine as co-morbidities limit treatment options and is already on multiple preventives.  As she will be switching insurances in the next few weeks (turning 67 in 3 weeks), will wait until she is on her new insurance to seek approval which she is in agreement with.  At that time, would also refer her to Dr. Torres in pain management to help with chronic LBP.      BOTOX  The patient has chronic migraines (G43.719) and suffers from headaches more than 15 days a month lasting more than 4 hours a day with no relief of symptoms despite trying multiple medications listed above. Botox treatment was approved for chronic migraines in October 2010. The patient will be an ideal candidate for Botox. We are planning for 3 treatments 3 months apart and aiming for at least 50% improvement in the symptoms. If we see no improvement after 3 treatments, we will discontinue the injections.    PLAN:  - Seek approval for Botox once she has new insurance   - Will refer to Dr. Torres with pain management at that time   - Continue to follow-up  with Dr. Henao for management of migraines   - Continue working on lifestyle modifications for headaches  - Discussed goals of therapy are to decrease the frequency, intensity, and duration of headaches  - Follow up in 6 weeks to 2 months if Botox is approved     I spent 30 minutes face-to-face with the patient with >50% of the time spent with counseling and education regarding:  - results of data, diagnosis, and recommendations stated above  - risks and benefits of Botox injections for chronic migraine  - Discussion regarding the procedure and amount of injections as well as possible side effects   - importance of healthy diet, exercise, and sleep hygiene in management of chronic migraines   - The patient verbalizes understanding and agreement with the treatment plan. Questions were sought and answered to her stated verbal satisfaction.      CC: Kailey Navarro MD & Dr. Marino, P-C  [facility]   [phone]

## 2017-06-17 RX ORDER — TOPIRAMATE 100 MG/1
TABLET, FILM COATED ORAL
Qty: 120 TABLET | Refills: 10 | Status: CANCELLED | OUTPATIENT
Start: 2017-06-17

## 2017-06-24 ENCOUNTER — HOSPITAL ENCOUNTER (EMERGENCY)
Facility: HOSPITAL | Age: 67
Discharge: HOME | End: 2017-06-24
Attending: EMERGENCY MEDICINE
Payer: COMMERCIAL

## 2017-06-24 ENCOUNTER — NURSE TRIAGE (OUTPATIENT)
Dept: ADMINISTRATIVE | Facility: CLINIC | Age: 67
End: 2017-06-24

## 2017-06-24 VITALS
DIASTOLIC BLOOD PRESSURE: 65 MMHG | HEART RATE: 76 BPM | OXYGEN SATURATION: 97 % | HEIGHT: 69 IN | SYSTOLIC BLOOD PRESSURE: 140 MMHG | TEMPERATURE: 98 F | WEIGHT: 293 LBS | BODY MASS INDEX: 43.4 KG/M2 | RESPIRATION RATE: 18 BRPM

## 2017-06-24 DIAGNOSIS — T83.510A URINARY TRACT INFECTION ASSOCIATED WITH CYSTOSTOMY CATHETER, INITIAL ENCOUNTER: Primary | ICD-10-CM

## 2017-06-24 DIAGNOSIS — N39.0 URINARY TRACT INFECTION ASSOCIATED WITH CYSTOSTOMY CATHETER, INITIAL ENCOUNTER: Primary | ICD-10-CM

## 2017-06-24 DIAGNOSIS — R11.0 NAUSEA: ICD-10-CM

## 2017-06-24 DIAGNOSIS — R03.0 ELEVATED BLOOD PRESSURE READING: ICD-10-CM

## 2017-06-24 DIAGNOSIS — R51.9 NONINTRACTABLE HEADACHE, UNSPECIFIED CHRONICITY PATTERN, UNSPECIFIED HEADACHE TYPE: ICD-10-CM

## 2017-06-24 LAB
BACTERIA #/AREA URNS AUTO: ABNORMAL /HPF
BILIRUB UR QL STRIP: NEGATIVE
BUN SERPL-MCNC: 29 MG/DL (ref 6–30)
CHLORIDE SERPL-SCNC: 107 MMOL/L (ref 95–110)
CLARITY UR REFRACT.AUTO: ABNORMAL
COLOR UR AUTO: YELLOW
CREAT SERPL-MCNC: 2 MG/DL (ref 0.5–1.4)
GLUCOSE SERPL-MCNC: 132 MG/DL (ref 70–110)
GLUCOSE UR QL STRIP: NEGATIVE
HCT VFR BLD CALC: 40 %PCV (ref 36–54)
HGB UR QL STRIP: ABNORMAL
KETONES UR QL STRIP: NEGATIVE
LEUKOCYTE ESTERASE UR QL STRIP: ABNORMAL
MICROSCOPIC COMMENT: ABNORMAL
NITRITE UR QL STRIP: POSITIVE
PH UR STRIP: 6 [PH] (ref 5–8)
POC IONIZED CALCIUM: 1.14 MMOL/L (ref 1.06–1.42)
POC TCO2 (MEASURED): 21 MMOL/L (ref 23–29)
POTASSIUM BLD-SCNC: 4.1 MMOL/L (ref 3.5–5.1)
PROT UR QL STRIP: NEGATIVE
RBC #/AREA URNS AUTO: 3 /HPF (ref 0–4)
SAMPLE: ABNORMAL
SODIUM BLD-SCNC: 139 MMOL/L (ref 136–145)
SP GR UR STRIP: 1 (ref 1–1.03)
SQUAMOUS #/AREA URNS AUTO: 0 /HPF
URN SPEC COLLECT METH UR: ABNORMAL
UROBILINOGEN UR STRIP-ACNC: NEGATIVE EU/DL
WBC #/AREA URNS AUTO: 39 /HPF (ref 0–5)
WBC CLUMPS UR QL AUTO: ABNORMAL

## 2017-06-24 PROCEDURE — 87077 CULTURE AEROBIC IDENTIFY: CPT

## 2017-06-24 PROCEDURE — 25000003 PHARM REV CODE 250: Performed by: EMERGENCY MEDICINE

## 2017-06-24 PROCEDURE — 81001 URINALYSIS AUTO W/SCOPE: CPT

## 2017-06-24 PROCEDURE — 87086 URINE CULTURE/COLONY COUNT: CPT

## 2017-06-24 PROCEDURE — 63600175 PHARM REV CODE 636 W HCPCS: Performed by: EMERGENCY MEDICINE

## 2017-06-24 PROCEDURE — 81003 URINALYSIS AUTO W/O SCOPE: CPT

## 2017-06-24 PROCEDURE — 87186 SC STD MICRODIL/AGAR DIL: CPT

## 2017-06-24 PROCEDURE — 87088 URINE BACTERIA CULTURE: CPT

## 2017-06-24 PROCEDURE — 99285 EMERGENCY DEPT VISIT HI MDM: CPT | Mod: ,,, | Performed by: EMERGENCY MEDICINE

## 2017-06-24 PROCEDURE — 96374 THER/PROPH/DIAG INJ IV PUSH: CPT

## 2017-06-24 PROCEDURE — 99284 EMERGENCY DEPT VISIT MOD MDM: CPT | Mod: 25

## 2017-06-24 RX ORDER — DIPHENHYDRAMINE HCL 25 MG
25 CAPSULE ORAL
Status: COMPLETED | OUTPATIENT
Start: 2017-06-24 | End: 2017-06-24

## 2017-06-24 RX ORDER — METOCLOPRAMIDE HYDROCHLORIDE 5 MG/ML
10 INJECTION INTRAMUSCULAR; INTRAVENOUS
Status: COMPLETED | OUTPATIENT
Start: 2017-06-24 | End: 2017-06-24

## 2017-06-24 RX ORDER — SULFAMETHOXAZOLE AND TRIMETHOPRIM 800; 160 MG/1; MG/1
1 TABLET ORAL 2 TIMES DAILY
Qty: 6 TABLET | Refills: 0 | Status: SHIPPED | OUTPATIENT
Start: 2017-06-24 | End: 2017-06-27

## 2017-06-24 RX ADMIN — DIPHENHYDRAMINE HYDROCHLORIDE 25 MG: 25 CAPSULE ORAL at 05:06

## 2017-06-24 RX ADMIN — METOCLOPRAMIDE 10 MG: 5 INJECTION, SOLUTION INTRAMUSCULAR; INTRAVENOUS at 05:06

## 2017-06-24 NOTE — ED PROVIDER NOTES
"Encounter Date: 6/24/2017       History     Chief Complaint   Patient presents with    Headache     66 year old lady with CKD, chronic indwelling suprapubic cathter, extensive history of migraine headaches, presents with elevated blood pressure, headache, and nausea. Patient states she started to feel nauseated last evening. Shortly after she developed a headache that she describes as "a vice " to bilateral parietal region. She has had many headaches similar to this in the past. HA has gradually worsened overnight and is associated with nausea. She took an Excedrin Migraine earlier this morning and took her sumatriptan around noon. She states she took her BP at home and it was elevated with SBP in the 190s. She was concerned her HA was related to her BP being elevated which is what prompted her to come to the ER. She has had high blood pressure in the past but it has never been this high. She states in her recent clinic visits the highest recording she recalls is an SBP of 142. She denies fever, numbness/weakness, visual disturbance, CP, or shortness of breath. She denies any change in the appearance or odor of her urine. She is scheduled to have her catheter exchanged this week.           Review of patient's allergies indicates:  No Known Allergies  Past Medical History:   Diagnosis Date    CKD (chronic kidney disease) stage 3, GFR 30-59 ml/min     as per patient    Diabetes type 2, uncontrolled 12/12/2012    DJD (degenerative joint disease) 12/12/2012    Hypertension 12/12/2012    Hypothyroidism 12/12/2012    Migraine     Nuclear sclerosis - Both Eyes 3/24/2014    Obesity 12/12/2012     Past Surgical History:   Procedure Laterality Date    BREAST BIOPSY Right     benign    CHOLECYSTECTOMY      COLONOSCOPY N/A 1/13/2017    Procedure: COLONOSCOPY;  Surgeon: Morris Wiseman MD;  Location: [facility] ([address] [place] [address]);  Service: Endoscopy;  Laterality: N/A;  Renal pt Nephrology advised to avoid phosphate " preps    DILATION AND CURETTAGE OF UTERUS      GALLBLADDER SURGERY  2006    OVARIAN CYST SURGERY  1985    spt placement      TONSILLECTOMY, ADENOIDECTOMY      TOTAL ABDOMINAL HYSTERECTOMY W/ BILATERAL SALPINGOOPHORECTOMY  1985     Family History   Problem Relation Age of Onset    Diabetes Sister     Kidney disease Sister     ALS Mother      d.    Cancer Maternal Grandmother      d. colon    Cancer Paternal Grandfather      d. lung    Scoliosis Brother     Prostate cancer Brother     Diabetes Maternal Aunt     Kidney disease Maternal Aunt     Diabetes Maternal Uncle     Amblyopia Neg Hx     Blindness Neg Hx     Cataracts Neg Hx     Glaucoma Neg Hx     Macular degeneration Neg Hx     Retinal detachment Neg Hx     Strabismus Neg Hx      Social History   Substance Use Topics    Smoking status: Never Smoker    Smokeless tobacco: Never Used    Alcohol use No     Review of Systems   Constitutional: Negative for fever.   Eyes: Negative for visual disturbance.   Respiratory: Negative for shortness of breath.    Cardiovascular: Negative for chest pain.   Gastrointestinal: Positive for nausea. Negative for abdominal pain and vomiting.   Genitourinary: Negative for hematuria.   Neurological: Positive for headaches. Negative for speech difficulty, weakness and numbness.   All other systems reviewed and are negative.      Physical Exam     Initial Vitals [06/24/17 1552]   BP Pulse Resp Temp SpO2   (!) 197/102 88 16 98.4 °F (36.9 °C) 100 %      MAP       133.67         Physical Exam    Nursing note and vitals reviewed.  Constitutional: She appears well-developed and well-nourished. She is not diaphoretic. No distress.   Patient smiling, pleasant, playing Brighter Future Challenge   HENT:   Mouth/Throat: Oropharynx is clear and moist.   Eyes: EOM are normal. Pupils are equal, round, and reactive to light.   Neck: Neck supple.   Cardiovascular: Normal rate and regular rhythm.   Pulmonary/Chest: Breath sounds normal.    Abdominal: Soft. Bowel sounds are normal.   Genitourinary:   Genitourinary Comments: Suprapubic catheter draining yellow, cloudy urine   Neurological: She is alert and oriented to person, place, and time. She has normal strength. No cranial nerve deficit or sensory deficit.   Skin: Skin is warm and dry.         ED Course   Procedures  Labs Reviewed   URINALYSIS, REFLEX TO URINE CULTURE - Abnormal; Notable for the following:        Result Value    Appearance, UA Cloudy (*)     Occult Blood UA 2+ (*)     Nitrite, UA Positive (*)     Leukocytes, UA 3+ (*)     All other components within normal limits    Narrative:     Preferred Collection Type->Urine, Clean Catch   URINALYSIS MICROSCOPIC - Abnormal; Notable for the following:     WBC, UA 39 (*)     WBC Clumps, UA Many (*)     Bacteria, UA Moderate (*)     All other components within normal limits    Narrative:     Preferred Collection Type->Urine, Clean Catch   ISTAT PROCEDURE - Abnormal; Notable for the following:     POC Glucose 132 (*)     POC Creatinine 2.0 (*)     POC TCO2 (MEASURED) 21 (*)     All other components within normal limits   CULTURE, URINE    Narrative:     Preferred Collection Type->Urine, Clean Catch                   APC / Resident Notes:   67 yo F with headache, nausea, elevated blood pressure. Neuro non focal. Ddx tension HA v. Migraine HA v. UTI v. ZAK. I do not suspect hypertensive emergency v. SAH v. Meningitis. Work up initiated with istat Chem, UA. Will treat with reglan, benadryl. On reassessment of patient .  Maci Leos MD PGY3  [employer] [facility]  4:43 PM    Update:  Cr 2- at baseline. UA nitrite leukocyte +. Patient reports she feels improved. Reviewed previous culture from nitrite + UA (7/2016) and at that time sensitive to bactrim. Pt scheduled for catheter exchange in 3 days. Will rx bactrim. Discussed instructions for low sodium diet and advised patient to record BP at home to bring to PCP for BP recheck. She understands and agrees  with plan.   Maci Leos MD PGY3  [employer] [facility]  5:57 PM             Attending Attestation:   Physician Attestation Statement for Resident:  As the supervising MD   Physician Attestation Statement: I have personally seen and examined this patient.   I agree with the above history. -:   As the supervising MD I agree with the above PE.    As the supervising MD I agree with the above treatment, course, plan, and disposition.                    ED Course     Clinical Impression:   The primary encounter diagnosis was Urinary tract infection associated with cystostomy catheter, initial encounter. Diagnoses of Nonintractable headache, unspecified chronicity pattern, unspecified headache type, Nausea, and Elevated blood pressure reading were also pertinent to this visit.                           Maci Leos MD  Resident  06/24/17 [id number]       Crow Devine MD  06/27/17 1413

## 2017-06-24 NOTE — ED TRIAGE NOTES
"Patient states onset headache, nausea and "unsteady on my feet" Woke up this am with same symptoms. h/o HBP no meds x 4 years. Prefers to stay in scooter. Patient took Norco 20 mg at 1100, Imitrex at 1500  "

## 2017-06-24 NOTE — TELEPHONE ENCOUNTER
"Pt called re BP up.     Reason for Disposition   [1] BP  >= 160 / 100 AND [2] cardiac or neurologic symptoms    (e.g., chest pain, difficulty breathing, unsteady gait, blurred vision)    Answer Assessment - Initial Assessment Questions  1. BLOOD PRESSURE: "What is the blood pressure?" "Did you take at least two measurements 5 minutes apart?"     191/142 HR= 74 in past 10 mins   2. ONSET: "When did you take your blood pressure?"     HA last pm , nausea yest. Woke with fever- not checked. Able to eat and drink, BS = 158 after eating earlier   3. HOW: "How did you obtain the blood pressure?" (e.g., visiting nurse, automatic home BP monitor)     Home cuff   4. HISTORY: "Do you have a history of high blood pressure?"     Yes   5. MEDICATIONS: "Are you taking any medications for blood pressure?" "Have you missed any doses recently?"     Took imitrex. no BP meds - last BP meds in 2013   6. OTHER SYMPTOMS: "Do you have any symptoms?" (e.g., headache, chest pain, blurred vision, difficulty breathing, weakness)     HA today, no CP no SOB, legs weak.    Protocols used:  HIGH BLOOD PRESSURE-A-[facility] local ED due to elevated BP, unsteady on feet, HA.   Call back with questions.   "

## 2017-06-24 NOTE — ED NOTES
Patient identifiers verified and correct for Ms Bowen  C/C: HBP  APPEARANCE: awake and alert in NAD.  SKIN: warm, dry and intact. No breakdown or bruising.  MUSCULOSKELETAL: Patient moving all extremities spontaneously, no obvious swelling or deformities noted. Uses scooter  RESPIRATORY: Denies shortness of breath.Respirations unlabored. All breath sounds CTA bilaterally.  CARDIAC: Denies CP; 2+ distal pulses;min peripheral edema  ABDOMEN: S/ND/NT, Denies nausea, normoactive bowel sounds present in all four quadrants. Normal stool pattern.  : voids spontaneously without difficulty, suprapubic catheter  Neurologic: AAO x 4; follows commands equal strength in all extremities; denies numbness/tingling. PERRLA C/o dizziness andheadache.

## 2017-06-24 NOTE — ED NOTES
Discharge home with family, states understanding to follow up as directed. Ambulates to scooter  without difficulty. RX given,

## 2017-06-25 ENCOUNTER — PATIENT MESSAGE (OUTPATIENT)
Dept: NEPHROLOGY | Facility: CLINIC | Age: 67
End: 2017-06-25

## 2017-06-26 LAB — BACTERIA UR CULT: NORMAL

## 2017-06-29 ENCOUNTER — OFFICE VISIT (OUTPATIENT)
Dept: UROLOGY | Facility: CLINIC | Age: 67
End: 2017-06-29
Payer: COMMERCIAL

## 2017-06-29 VITALS
RESPIRATION RATE: 16 BRPM | HEIGHT: 68 IN | BODY MASS INDEX: 44.41 KG/M2 | WEIGHT: 293 LBS | DIASTOLIC BLOOD PRESSURE: 74 MMHG | SYSTOLIC BLOOD PRESSURE: 129 MMHG | HEART RATE: 100 BPM

## 2017-06-29 DIAGNOSIS — Z43.5 ENCOUNTER FOR SUPRAPUBIC CATHETER CARE: ICD-10-CM

## 2017-06-29 DIAGNOSIS — N31.9 NEUROGENIC BLADDER: Primary | ICD-10-CM

## 2017-06-29 DIAGNOSIS — E11.9 TYPE 2 DIABETES MELLITUS NOT AT GOAL: ICD-10-CM

## 2017-06-29 DIAGNOSIS — L92.9 GRANULATION TISSUE: ICD-10-CM

## 2017-06-29 DIAGNOSIS — R33.9 URINARY RETENTION: ICD-10-CM

## 2017-06-29 PROCEDURE — 51705 CHANGE OF BLADDER TUBE: CPT | Mod: S$GLB,,, | Performed by: NURSE PRACTITIONER

## 2017-06-29 PROCEDURE — 99214 OFFICE O/P EST MOD 30 MIN: CPT | Mod: 25,S$GLB,, | Performed by: NURSE PRACTITIONER

## 2017-06-29 PROCEDURE — 17250 CHEM CAUT OF GRANLTJ TISSUE: CPT | Mod: 51,S$GLB,, | Performed by: NURSE PRACTITIONER

## 2017-06-29 PROCEDURE — 99999 PR PBB SHADOW E&M-EST. PATIENT-LVL III: CPT | Mod: PBBFAC,,, | Performed by: NURSE PRACTITIONER

## 2017-06-29 RX ORDER — PEN NEEDLE, DIABETIC 31 GX5/16"
NEEDLE, DISPOSABLE MISCELLANEOUS
Qty: 150 EACH | Refills: 3 | Status: SHIPPED | OUTPATIENT
Start: 2017-06-29 | End: 2018-10-24 | Stop reason: SDUPTHER

## 2017-06-29 RX ORDER — CIPROFLOXACIN 500 MG/1
500 TABLET ORAL
COMMUNITY
End: 2017-08-23

## 2017-06-29 NOTE — PROGRESS NOTES
Subjective:       Patient ID: Cecile Bowen is a 66 y.o. female.    Chief Complaint: Urinary Retention (Neurogenic Bladder; SPT Change)    Cecile Bowen is a 66 y.o. Female with history of bilateral hydronephrosis, incomplete bladder emptying which is managed by SPT (16fr).  Her last SPT change was 05/25/2017    Today she voices no complaints.  Here today for new SPT.  Good urine flow through SPT.  She reports was in the ER for headache and elevated BP.  Was not admitted.   She was started on cipro 2/2 UTI; currently taking them.  06/24/2017 Urine Culture, Routine  PSEUDOMONAS AERUGINOSA  >100,000 cfu/ml           Last visit with Dr. Wihttington was 11/10/2015.      12/10/2015:  Procedure(s) Performed:   1. Cystoscopy with bladder botox injection  2. Silver nitrate to suprapubic catheter site  3 . Change suprapubic catheter tube            Past Medical History:    Diabetes type 2, uncontrolled                   12/12/2012    Obesity                                         12/12/2012    Hypertension                                    12/12/2012    DJD (degenerative joint disease)                12/12/2012    Hypothyroidism                                  12/12/2012    Nuclear sclerosis - Both Eyes                   3/24/2014     Migraine                                                      Past Surgical History:    TOTAL ABDOMINAL HYSTERECTOMY W/ BILATERAL SALP*  1985          GALLBLADDER SURGERY                              2006          TONSILLECTOMY, ADENOIDECTOMY                                   OVARIAN CYST SURGERY                             1985          HYSTERECTOMY                                                   DILATION AND CURETTAGE OF UTERUS                               Review of patient's family history indicates:    Diabetes                       Sister                    Kidney disease                 Sister                    ALS                            Mother                      Comment:  "d.    Cancer                         Maternal Grandmother        Comment: d. colon    Cancer                         Paternal Grandfather        Comment: d. lung    Diabetes                       Maternal Aunt             Kidney disease                 Maternal Aunt             Diabetes                       Maternal Uncle            Amblyopia                      Neg Hx                    Blindness                      Neg Hx                    Cataracts                      Neg Hx                    Glaucoma                       Neg Hx                    Macular degeneration           Neg Hx                    Retinal detachment             Neg Hx                    Strabismus                     Neg Hx                      Social History    Marital Status:             Spouse Name:                       Years of Education:                 Number of children:               Occupational History    None on file    Social History Main Topics    Smoking Status: Never Smoker                      Smokeless Status: Never Used                        Alcohol Use: No              Drug Use: No              Sexual Activity: Not Currently           Birth Control/Protection: Abstinence    Other Topics            Concern    None on file    Social History Narrative    Single      Lives w/ sister  And brother     both are helping her during her post hosp recovery period        Allergies:  Review of patient's allergies indicates no known allergies.    Medications:  Current outpatient prescriptions:amitriptyline (ELAVIL) 10 MG tablet, TAKE 3 TABLETS BY MOUTH IN THE EVENING, Disp: 90 tablet, Rfl: 5;  aspirin (ECOTRIN) 81 MG EC tablet, Take 81 mg by mouth once daily., Disp: , Rfl: ;  BD INSULIN PEN NEEDLE UF SHORT 31 X 5/16 " Ndle, USE ONCE DAILY WITH LANTUS SOLOSTAR PEN INSULIN, Disp: 100 each, Rfl: 11  butalbital-acetaminophen-caffeine -40 mg (FIORICET, ESGIC) -40 mg per tablet, TAKE 1 TABLET EVERY 4 TO 6 HOURS, " "Disp: 30 tablet, Rfl: 0;  cyanocobalamin, vitamin B-12, (VITAMIN B-12) 2,500 mcg Subl, Place 2,500 mcg under the tongue once daily., Disp: , Rfl: ;  diphenhydrAMINE (BENADRYL) 25 mg capsule, Take 25 mg by mouth every 6 (six) hours as needed., Disp: , Rfl:   ferrous sulfate 325 (65 FE) MG EC tablet, Take 325 mg by mouth 3 (three) times daily with meals., Disp: , Rfl: ;  fluticasone (FLONASE) 50 mcg/actuation nasal spray, 1 SPRAY IN EACH NOSTRIL DAILY (Patient taking differently: 1 SPRAY IN EACH NOSTRIL DAILY PRN), Disp: 16 g, Rfl: 1;  furosemide (LASIX) 20 MG tablet, Take 1 tablet (20 mg total) by mouth once daily., Disp: 4 tablet, Rfl: 0  gemfibrozil (LOPID) 600 MG tablet, TAKE 1 TABLET BY MOUTH TWICE A DAY, Disp: 60 tablet, Rfl: 5;  (START ON 4/29/2015) hydrocodone-acetaminophen 10-325mg (NORCO)  mg Tab, Take 1 tablet by mouth every 6 (six) hours as needed., Disp: 120 tablet, Rfl: 0;  insulin lispro (HUMALOG) 100 unit/mL injection, Inject 7 Units into the skin 3 (three) times daily before meals., Disp: 6.3 mL, Rfl: 11  insulin syringe-needle U-100 (BD INSULIN SYRINGE ULTRA-FINE) 1/2 mL 31 x 15/64" Syrg, 1 Box by Misc.(Non-Drug; Combo Route) route 4 (four) times daily., Disp: 100 Syringe, Rfl: 12;  lancets (ONE TOUCH DELICA LANCETS) Misc, Ultra 2 lancets to check blood sugar BID, Disp: 100 each, Rfl: 6;  LANTUS SOLOSTAR 100 unit/mL (3 mL) InPn pen, , Disp: , Rfl: 3  LIDODERM 5 %(700 mg/patch), APPLY 1 PATCH TO SKIN DAILY (LEAVING ON FOR 12 HOURS AND OFF FOR 12 HOURS), Disp: 30 patch, Rfl: 6;  magnesium oxide (MAG-OX) 400 mg tablet, TAKE 1 TABLET (400 MG TOTAL) BY MOUTH 2 (TWO) TIMES DAILY., Disp: 60 tablet, Rfl: 11;  methocarbamol (ROBAXIN) 750 MG Tab, TAKE 1 TO 2 TABLETS BY MOUTH 3 TIMES A DAY (Patient taking differently: Take 2 tablets twice daily), Disp: 120 tablet, Rfl: 3  methocarbamol (ROBAXIN) 750 MG Tab, TAKE 1 TO 2 TABLETS BY MOUTH 3 TIMES A DAY, Disp: 120 tablet, Rfl: 3;  methylPREDNISolone (MEDROL " DOSEPACK) 4 mg tablet, use as directed, Disp: 21 tablet, Rfl: 0;  multivitamin with minerals tablet, Take 1 tablet by mouth once daily., Disp: , Rfl: ;  pravastatin (PRAVACHOL) 40 MG tablet, TAKE 1 TABLET BY MOUTH EVERY DAY, Disp: 30 tablet, Rfl: 2  pyridoxine (B-6) 100 MG Tab, Take 1 tablet (100 mg total) by mouth once daily., Disp: 30 tablet, Rfl: 12;  scopolamine (TRANSDERM-SCOP) 1.5 mg, Place 1 patch (1.5 mg total) onto the skin every 72 hours., Disp: 4 patch, Rfl: 0;  sulfamethoxazole-trimethoprim 800-160mg (BACTRIM DS) 800-160 mg Tab, Take 1 tablet by mouth 2 (two) times daily., Disp: , Rfl: 0  sumatriptan (IMITREX) 100 MG tablet, TAKE 1 TABLET (100 MG TOTAL) BY MOUTH ONCE., Disp: 9 tablet, Rfl: 6;  SYNTHROID 125 mcg tablet, TAKE 1 TABLET BY MOUTH DAILY, Disp: 90 tablet, Rfl: 4;  topiramate (TOPAMAX) 100 MG tablet, TAKE 1 TABLET (100 MG TOTAL) BY MOUTH 2 (TWO) TIMES DAILY., Disp: 60 tablet, Rfl: 11;  topiramate (TOPAMAX) 25 MG tablet, Take 4 tablets (100 mg total) by mouth 2 (two) times daily., Disp: 240 tablet, Rfl: 5  venlafaxine (EFFEXOR-XR) 150 MG Cp24, TAKE 1 CAPSULE BY MOUTH ONCE DAILY, Disp: 30 capsule, Rfl: 2;  vitamin D (VITAMIN D3) 185 MG Tab, Take 185 mg by mouth once daily., Disp: , Rfl:           Review of Systems   Constitutional: Negative.  Negative for activity change, appetite change, chills and fever.   HENT: Negative for congestion, facial swelling, mouth sores, rhinorrhea, sore throat and trouble swallowing.    Eyes: Negative for photophobia, discharge and visual disturbance.   Respiratory: Negative for apnea, cough, chest tightness and wheezing.    Cardiovascular: Negative for chest pain and palpitations.   Gastrointestinal: Negative for abdominal pain, anal bleeding, constipation, diarrhea, nausea and vomiting.   Genitourinary: Positive for difficulty urinating. Negative for decreased urine volume, dysuria, flank pain, frequency, hematuria, nocturia, pelvic pain and urgency.        SPT  draining well.     Musculoskeletal: Positive for arthralgias and gait problem. Negative for back pain, neck pain and neck stiffness.   Skin: Negative.  Negative for rash.   Neurological: Negative for dizziness, seizures, speech difficulty, weakness and headaches.   Psychiatric/Behavioral: Negative for agitation, confusion, self-injury, sleep disturbance and suicidal ideas. The patient is not nervous/anxious.        Objective:      Physical Exam   Nursing note and vitals reviewed.  Constitutional: She is oriented to person, place, and time. Vital signs are normal. She appears well-developed and well-nourished. She is active and cooperative.   HENT:   Head: Normocephalic.   Right Ear: External ear normal.   Left Ear: External ear normal.   Nose: Nose normal.   Eyes: Conjunctivae and lids are normal. Right eye exhibits no discharge. Left eye exhibits no discharge. No scleral icterus.   Neck: Trachea normal and normal range of motion. Neck supple. No tracheal deviation present.   Cardiovascular: Normal rate, regular rhythm and intact distal pulses.    Pulmonary/Chest: Effort normal. No respiratory distress.   Abdominal: Soft. Bowel sounds are normal. She exhibits no distension and no mass. There is no tenderness. There is no rebound and no guarding.       Musculoskeletal: Normal range of motion. She exhibits no edema.   Neurological: She is alert and oriented to person, place, and time.   Skin: Skin is warm, dry and intact.     Psychiatric: She has a normal mood and affect. Her behavior is normal. Judgment and thought content normal.       Assessment:       1. Neurogenic bladder    2. Urinary retention    3. Encounter for suprapubic catheter care        Plan:         I spent 25 minutes with the patient of which more than half was spent in direct consultation with the patient in regards to our treatment and plan.    Education and recommendations of today's plan of care including home remedies.  Old SPT easily  removed.  Area prepped with betadine and new 16fr SPT easily placed using sterile technique.  Irrigated the bladder to verify the position  Balloon inflated with 8cc sterile water.  Silver nitrate sticks x 3 used to granulation tissue.  Tolerated well.  Dsg applied.  RTC one month

## 2017-07-03 ENCOUNTER — OFFICE VISIT (OUTPATIENT)
Dept: INTERNAL MEDICINE | Facility: CLINIC | Age: 67
End: 2017-07-03
Payer: MEDICARE

## 2017-07-03 VITALS
TEMPERATURE: 99 F | RESPIRATION RATE: 16 BRPM | HEIGHT: 68 IN | HEART RATE: 88 BPM | BODY MASS INDEX: 44.41 KG/M2 | WEIGHT: 293 LBS | DIASTOLIC BLOOD PRESSURE: 70 MMHG | SYSTOLIC BLOOD PRESSURE: 120 MMHG

## 2017-07-03 DIAGNOSIS — N18.4 CKD (CHRONIC KIDNEY DISEASE), STAGE IV: ICD-10-CM

## 2017-07-03 DIAGNOSIS — I10 ESSENTIAL HYPERTENSION: ICD-10-CM

## 2017-07-03 DIAGNOSIS — H93.13 TINNITUS, BILATERAL: ICD-10-CM

## 2017-07-03 DIAGNOSIS — R51.9 HEADACHE, UNSPECIFIED HEADACHE TYPE: ICD-10-CM

## 2017-07-03 DIAGNOSIS — N39.0 RECURRENT UTI: Primary | ICD-10-CM

## 2017-07-03 PROCEDURE — 99214 OFFICE O/P EST MOD 30 MIN: CPT | Mod: S$PBB,,, | Performed by: INTERNAL MEDICINE

## 2017-07-03 PROCEDURE — 99999 PR PBB SHADOW E&M-EST. PATIENT-LVL III: CPT | Mod: PBBFAC,,,

## 2017-07-03 PROCEDURE — 99213 OFFICE O/P EST LOW 20 MIN: CPT | Mod: PBBFAC,PO

## 2017-07-03 NOTE — PROGRESS NOTES
"Subjective:       Patient ID: Cecile Bowen is a 66 y.o. female.    Chief Complaint: Hospital Follow Up (ER follow up)    66 YOF with HTN, DM2, CKD 4, migraine headaches, chronic indwelling suprapubic catheter with hx recurrent UTI followed by urology, fibromyalgia, and OA presenting for ER follow up. Patient evaluated at [facility] ED on 6/24 with headache, elevated blood pressure and nausea. She was found to have UTI, urine cx has since grown pseudomonas. She was prescribed 3 days of bactrim and discharged. She did not take any bactrim because she waits for cultures to finalize. Patient currently taking ciprofloxacin, she is prescribed a 7 day course course. She normally takes 10 days. She has 4 more days left. She normally does not get symptoms with UTIs, sometimes heamturia. She reports fatigue currently. Denies hematuria. Denies drainage from catheter site. She has her SPC changed on 6/29 with urology. She reports it is changed monthly and her urine is also collected monthly.     She was also hypertensive at ED to 197/102. Her pain was treated and blood pressure normalized in ED. She does not take antihypertensives chronically and is normotensive today. She has had several migraines since her ED visit, reports she "stays with headaches." the  Migraines have decreased in frequency and are improving with low salt diet. States she is now eating more salads and meals on wheels is delivering one meal per week. Reports normal blood pressures at home with low salt diet.    tinnitis with increasing loudness by about 25 % recently. Having trouble hearing TV. "high frequency wining sound." Has not seen ENT yet. Wondering if there is any medication for this. Present all the time for many years now. Nothing improves or worsens it.    Patient recently changed to medicare. The only medication issue she is finding is prn scopolamine patches for when she goes on cruises are not covered.      Review of Systems   Constitutional: " Positive for fatigue. Negative for chills and fever.   Respiratory: Negative for shortness of breath.    Gastrointestinal: Negative for abdominal pain, nausea and vomiting.   Genitourinary: Negative for hematuria and pelvic pain.   Musculoskeletal: Positive for arthralgias, back pain and gait problem.   Neurological: Positive for dizziness and headaches.       Objective:      Physical Exam   Constitutional: She is oriented to person, place, and time. She appears well-developed and well-nourished. No distress.   HENT:   Head: Normocephalic and atraumatic.   Nose: Nose normal.   Eyes: Conjunctivae are normal. No scleral icterus.   Cardiovascular: Normal rate and regular rhythm.    Murmur heard.  Pulmonary/Chest: Effort normal and breath sounds normal. No respiratory distress. She has no wheezes.   Abdominal: Soft. Bowel sounds are normal. She exhibits no distension. There is no tenderness.   Suprapubic catheter site with scant amount dried blood on skin. Mild erythema in skin fold lateral to site. No gross drainage or induration   Neurological: She is alert and oriented to person, place, and time.   Skin: Skin is warm and dry. She is not diaphoretic.   Vitals reviewed.      Assessment:       1. Recurrent UTI    2. Essential hypertension    3. Headache, unspecified headache type    4. Tinnitus, bilateral    5. CKD (chronic kidney disease), stage IV        Plan:         1. Recurrent UTI  -- Patient currently taking 7 day course of cipro for UTI. Complete ciprofloxacin 7 day course  -- has monthly exams with urology for catheter exchange. Will get repeat urine culture with urology at next visit.    2. HTN  -- normotensive in office today  -- cont to monitor BPs at home  -- cont low salt diet    3. Headache   -- possibly 2/2 HTN; patient has hx chronic headaches of multiple subtypes.  -- improved with new low salt diet  -- cont low salt diet    4. Tinnitis  -- discussed referral to ENT    5. CKD4  -- cr at  baseline    Patient seen and discussed with Dr. Julita Pineda MD  IM PGY 2    I have interviewed and examined the patient w/ the resident, Dr. Pineda  I agree w/ the impression and plan as outlined above by her.     Lureds Cancino MD

## 2017-07-10 ENCOUNTER — PATIENT MESSAGE (OUTPATIENT)
Dept: NEUROLOGY | Facility: CLINIC | Age: 67
End: 2017-07-10

## 2017-07-10 RX ORDER — ERGOCALCIFEROL 1.25 MG/1
CAPSULE ORAL
Qty: 4 CAPSULE | Refills: 2 | Status: SHIPPED | OUTPATIENT
Start: 2017-07-10 | End: 2017-10-20 | Stop reason: SDUPTHER

## 2017-07-11 RX ORDER — TOPIRAMATE 100 MG/1
TABLET, FILM COATED ORAL
Qty: 60 TABLET | Refills: 11 | Status: SHIPPED | OUTPATIENT
Start: 2017-07-11 | End: 2017-07-12

## 2017-07-12 DIAGNOSIS — G43.711 INTRACTABLE CHRONIC MIGRAINE WITHOUT AURA AND WITH STATUS MIGRAINOSUS: Primary | ICD-10-CM

## 2017-07-12 RX ORDER — TOPIRAMATE 200 MG/1
200 TABLET ORAL 2 TIMES DAILY
Qty: 60 TABLET | Refills: 11 | Status: SHIPPED | OUTPATIENT
Start: 2017-07-12 | End: 2017-08-23

## 2017-07-19 RX ORDER — PRAVASTATIN SODIUM 40 MG/1
TABLET ORAL
Qty: 30 TABLET | Refills: 5 | Status: SHIPPED | OUTPATIENT
Start: 2017-07-19 | End: 2018-03-10 | Stop reason: SDUPTHER

## 2017-07-26 RX ORDER — INSULIN LISPRO 100 [IU]/ML
INJECTION, SOLUTION INTRAVENOUS; SUBCUTANEOUS
Qty: 10 ML | Refills: 11 | Status: SHIPPED | OUTPATIENT
Start: 2017-07-26 | End: 2018-01-24 | Stop reason: SDUPTHER

## 2017-07-27 ENCOUNTER — OFFICE VISIT (OUTPATIENT)
Dept: UROLOGY | Facility: CLINIC | Age: 67
End: 2017-07-27
Payer: MEDICARE

## 2017-07-27 VITALS — HEART RATE: 100 BPM | DIASTOLIC BLOOD PRESSURE: 85 MMHG | SYSTOLIC BLOOD PRESSURE: 176 MMHG

## 2017-07-27 DIAGNOSIS — Z43.5 ENCOUNTER FOR SUPRAPUBIC CATHETER CARE: ICD-10-CM

## 2017-07-27 DIAGNOSIS — L98.491 SKIN ULCERATION, LIMITED TO BREAKDOWN OF SKIN: Primary | ICD-10-CM

## 2017-07-27 DIAGNOSIS — N31.9 NEUROGENIC BLADDER: ICD-10-CM

## 2017-07-27 DIAGNOSIS — R33.9 URINARY RETENTION: ICD-10-CM

## 2017-07-27 DIAGNOSIS — Z93.59 CHRONIC SUPRAPUBIC CATHETER: ICD-10-CM

## 2017-07-27 DIAGNOSIS — L92.9 GRANULATION TISSUE: ICD-10-CM

## 2017-07-27 PROCEDURE — 99213 OFFICE O/P EST LOW 20 MIN: CPT | Mod: PBBFAC | Performed by: NURSE PRACTITIONER

## 2017-07-27 PROCEDURE — 51705 CHANGE OF BLADDER TUBE: CPT | Mod: PBBFAC | Performed by: NURSE PRACTITIONER

## 2017-07-27 PROCEDURE — 51705 CHANGE OF BLADDER TUBE: CPT | Mod: S$PBB,,, | Performed by: NURSE PRACTITIONER

## 2017-07-27 PROCEDURE — 99999 PR PBB SHADOW E&M-EST. PATIENT-LVL III: CPT | Mod: PBBFAC,,, | Performed by: NURSE PRACTITIONER

## 2017-07-27 PROCEDURE — 17250 CHEM CAUT OF GRANLTJ TISSUE: CPT | Mod: S$PBB,51,, | Performed by: NURSE PRACTITIONER

## 2017-07-27 PROCEDURE — 17250 CHEM CAUT OF GRANLTJ TISSUE: CPT | Mod: PBBFAC | Performed by: NURSE PRACTITIONER

## 2017-07-27 PROCEDURE — 99214 OFFICE O/P EST MOD 30 MIN: CPT | Mod: S$PBB,25,, | Performed by: NURSE PRACTITIONER

## 2017-07-27 RX ORDER — CEPHALEXIN 500 MG/1
500 CAPSULE ORAL EVERY 8 HOURS
Qty: 21 CAPSULE | Refills: 0 | Status: SHIPPED | OUTPATIENT
Start: 2017-07-27 | End: 2017-08-03

## 2017-07-28 DIAGNOSIS — G43.711 INTRACTABLE CHRONIC MIGRAINE WITHOUT AURA AND WITH STATUS MIGRAINOSUS: ICD-10-CM

## 2017-07-28 RX ORDER — SUMATRIPTAN SUCCINATE 100 MG/1
100 TABLET ORAL 2 TIMES DAILY PRN
Qty: 9 TABLET | Refills: 6 | OUTPATIENT
Start: 2017-07-28

## 2017-07-31 ENCOUNTER — PATIENT MESSAGE (OUTPATIENT)
Dept: ENDOCRINOLOGY | Facility: CLINIC | Age: 67
End: 2017-07-31

## 2017-07-31 ENCOUNTER — PATIENT MESSAGE (OUTPATIENT)
Dept: NEUROLOGY | Facility: CLINIC | Age: 67
End: 2017-07-31

## 2017-07-31 ENCOUNTER — TELEPHONE (OUTPATIENT)
Dept: ENDOCRINOLOGY | Facility: HOSPITAL | Age: 67
End: 2017-07-31

## 2017-07-31 DIAGNOSIS — G43.711 INTRACTABLE CHRONIC MIGRAINE WITHOUT AURA AND WITH STATUS MIGRAINOSUS: ICD-10-CM

## 2017-08-01 ENCOUNTER — TELEPHONE (OUTPATIENT)
Dept: PULMONOLOGY | Facility: CLINIC | Age: 67
End: 2017-08-01

## 2017-08-01 RX ORDER — SUMATRIPTAN SUCCINATE 100 MG/1
100 TABLET ORAL 2 TIMES DAILY PRN
Qty: 9 TABLET | Refills: 6 | Status: SHIPPED | OUTPATIENT
Start: 2017-08-01 | End: 2017-12-28 | Stop reason: SDUPTHER

## 2017-08-03 ENCOUNTER — TELEPHONE (OUTPATIENT)
Dept: PULMONOLOGY | Facility: CLINIC | Age: 67
End: 2017-08-03

## 2017-08-14 RX ORDER — SODIUM BICARBONATE 650 MG/1
TABLET ORAL
Qty: 90 TABLET | Refills: 3 | Status: SHIPPED | OUTPATIENT
Start: 2017-08-14 | End: 2017-10-16 | Stop reason: SDUPTHER

## 2017-08-22 ENCOUNTER — PATIENT MESSAGE (OUTPATIENT)
Dept: ENDOCRINOLOGY | Facility: CLINIC | Age: 67
End: 2017-08-22

## 2017-08-23 ENCOUNTER — OFFICE VISIT (OUTPATIENT)
Dept: NEUROLOGY | Facility: CLINIC | Age: 67
End: 2017-08-23
Payer: MEDICARE

## 2017-08-23 VITALS — SYSTOLIC BLOOD PRESSURE: 144 MMHG | DIASTOLIC BLOOD PRESSURE: 85 MMHG | HEIGHT: 68 IN | HEART RATE: 103 BPM

## 2017-08-23 DIAGNOSIS — G43.711 INTRACTABLE CHRONIC MIGRAINE WITHOUT AURA AND WITH STATUS MIGRAINOSUS: Primary | ICD-10-CM

## 2017-08-23 DIAGNOSIS — R41.3 MEMORY DIFFICULTY: ICD-10-CM

## 2017-08-23 PROCEDURE — 99999 PR PBB SHADOW E&M-EST. PATIENT-LVL III: CPT | Mod: PBBFAC,,, | Performed by: PSYCHIATRY & NEUROLOGY

## 2017-08-23 PROCEDURE — 3079F DIAST BP 80-89 MM HG: CPT | Mod: ,,, | Performed by: PSYCHIATRY & NEUROLOGY

## 2017-08-23 PROCEDURE — 99213 OFFICE O/P EST LOW 20 MIN: CPT | Mod: PBBFAC | Performed by: PSYCHIATRY & NEUROLOGY

## 2017-08-23 PROCEDURE — 99213 OFFICE O/P EST LOW 20 MIN: CPT | Mod: S$PBB,,, | Performed by: PSYCHIATRY & NEUROLOGY

## 2017-08-23 PROCEDURE — 3077F SYST BP >= 140 MM HG: CPT | Mod: ,,, | Performed by: PSYCHIATRY & NEUROLOGY

## 2017-08-23 RX ORDER — TOPIRAMATE 200 MG/1
200 TABLET ORAL 2 TIMES DAILY
Qty: 180 TABLET | Refills: 3 | Status: SHIPPED | OUTPATIENT
Start: 2017-08-23 | End: 2018-09-25 | Stop reason: SDUPTHER

## 2017-08-23 RX ORDER — RIBOFLAVIN (VITAMIN B2) 400 MG
400 TABLET ORAL DAILY
Qty: 90 TABLET | Refills: 3 | Status: ON HOLD | OUTPATIENT
Start: 2017-08-23 | End: 2021-05-28

## 2017-08-23 NOTE — LETTER
August 23, 2017      Lurdes Navarro MD  [address] [facility] [address]           [facility]  [address]  [place] [address]  Phone: [phone]  Fax: [phone]          Patient: Cecile Bowen   MR Number: [id number]   YOB: 1950   Date of Visit: 8/23/2017       Dear Dr. Lurdes Navarro:    Thank you for referring Cecile Bowen to me for evaluation. Attached you will find relevant portions of my assessment and plan of care.    If you have questions, please do not hesitate to call me. I look forward to following Cecile Bowen along with you.    Sincerely,    Enrique Henao MD    Enclosure  CC:  No Recipients    If you would like to receive this communication electronically, please contact [email or web address] or [phone] to request more information on [facility] access.    For providers and/or their staff who would like to refer a patient to [facility], please contact us through our one-stop-shop provider referral line, [facility], at [phone].    If you feel you have received this communication in error or would no longer like to receive these types of communications, please e-mail [email or web address]

## 2017-08-23 NOTE — PATIENT INSTRUCTIONS
Limit excedrin migraine use as much as possible, do not take more than 1 daily, to avoid rebound headache    Start supplementation with vitamin B2 (riboflavin) 400mg daily    Call [phone] if you do not hear from neuropsychology in the next two weeks.        Rebound Headache     Overuse of pain medications can lead to rebound headaches.   You use pain medicines called analgesics to treat your headaches. You are now having more frequent or intense headaches (rebound headaches). They are your bodys response to too much pain medicine. Prescription pain medicines can cause these headaches. But so can over-the-counter medicines like acetaminophen or ibuprofen. A drug that contains caffeine or butalbital is most likely to cause rebound headache.  Symptoms of rebound headache include:  · Mild to moderate headache for 15 or more days each month for 3 months or more  · Headache when you wake up that continues most of the day  · Headaches getting worse over time  · Need for more and more medicine to treat headaches  Rebound headaches are most often diagnosed by your symptoms and medicine history. You may need tests to rule out other causes of your headaches. In the emergency room, you may be given a non-analgesic pain medicine to treat the pain or stop future headaches.  Home care  Treatment involves stopping use of your pain medicines. Your healthcare provider can tell you how to safely do this. You may be able to stop right away. Or you may need to take less and less over time (taper off). This will depend on the medicines you have been taking. To do this, follow the schedule that your provider gives you. If you are taking pain medicines for other types of pain at the same time, your healthcare provider may need other specialists to participate in your care.  · For the first week or so after stopping, the headaches will likely get worse. You may also have withdrawal symptoms. These often include nausea, vomiting, and  trouble sleeping. You may be given a medicine to help relieve pain and withdrawal symptoms. Take this exactly as you have been told. It is vital to avoid taking daily pain medicine. If you do so, rebound headaches will continue.  · Caffeine can make rebound headaches worse. If you have caffeinated drinks every day, slowly cut your intake.  · Keep a written log of your headaches. This can help you and your healthcare provider track your progress.  · Be patient. It can take about 2 to 6 months to stop having rebound headaches.  · Once you have broken the headache cycle, be careful not to start it again. Work with your provider to make a treatment plan for headache pain that has low risk of causing rebound headaches.  · Relaxation can help lower tension and relieve pain. Try a massage, meditation, yoga, or other relaxation techniques. Or make time for a relaxing hobby that you enjoy.  Follow-up care  Follow up with your healthcare provider, or as advised.  When to seek medical advice  Call your healthcare provider right away if any of these occur:  · Fever of 100.4°F (38°C) or higher  · Headaches that wake you from sleep  · Repeated vomiting or visual problems that don't go away  · Headache with a stiff neck, rash, confusion, weakness, numbness, seizure (convulsion), or trouble talking  · Headache that starts after a head injury or fall  · A type of headache you have never had before  · Headache that gets worse despite rest and medicine  Date Last Reviewed: 11/20/2015  © 6763-5411 [employer]. [address] [place] [address], [place] [address]. All rights reserved. This information is not intended as a substitute for professional medical care. Always follow your healthcare professional's instructions.

## 2017-08-23 NOTE — PROGRESS NOTES
Follow up :   No benefit from GBP, only 2 HA free days per month but feels generally less severe - takes excedrin migraine 1-2 tabs daily    5/2017  Continued daily headache, stopped fioricet, continued relief from imitrex, unable to comply with CPAP due to severe distress caused by objects around her face related to childhood molesation     Headache history:   Age of onset - Teens   Location - Bioccipital goes to crown   Nature of pain - Throbbing   Prodrome - no   Aura - No   Duration of headache - hrs   Time to peak intensity - 1 hr   Pain scale - range of intensity - 7-8/10   Frequency - 4/week , now 5/month   Status Migrainosus history - yes   Time of day of most headaches- anytime   Associated symptoms with the headache:   Meningeal symptoms - photophobia, phonophobia, exercise intolerance +   Nausea/vomitting +   Nasal drainage   Visual blurriness   Pallor/flushing   Dizziness +   Vertigo   Confusion   Impaired concentration +   Pain worsened with physical activity +   Neck pain +   Tension HA:   Location - Bifrontal   Nature of pain - Gripping   Prodrome - no   Aura - No   Duration of headache - hrs   Time to peak intensity - 1 hr   Pain scale - range of intensity - 6/10   Frequency - daily   Time of day of most headaches- morning   Associated symptoms with the headache: none   Headache Triggers: Heat (hot weather, hot baths or showers) , emotional stress +   Weather change +   Other comorbid conditions:   Anxiety - yes   Motion sickness symptom - yes   Treatment history:   Resolution of headache with sleep - yes   Meds tried:   Fioricet - helps   Imitrex - helps   No headache benefit from:  Elavil, effexor, namenda, Mg, robaxin, flexeril, norco, unable to take NSAIDs due to renal failure  topamax - helped with migraine     Headache risk factors:   H/o TBI - no   H/o Meningitis - no   F/h Aneurysms - no   Takes naps during the day   Denies refreshing sleep   Snoring +   Review of Systems   Constitutional:  Negative.   Eyes: Negative.   Respiratory: Negative.   Cardiovascular: Negative.   Gastrointestinal: Negative.   Genitourinary: Negative.   Musculoskeletal: LBP +   Skin: Negative.   Neurological:Sleep disturbance + HA   Hematological: Negative.   Psychiatric/Behavioral: Negative.     Objective:    Physical Exam   Constitutional:   She appears well-developed and obese. She is well groomed   HENT:   Head: Atraumatic, oral and nasal mucosa intact   Mallampati - 3   Eyes: Conjunctivae and EOM are normal. Pupils are equal, round, and reactive to light OU   Neck: Neck supple. No thyromegaly present   Cardiovascular: Normal rate and normal heart sounds   No murmur heard   Pulmonary/Chest: Effort normal and breath sounds normal   Musculoskeletal: Normal range of motion. No joint stiffness. No vertebral point tenderness   Skin: Skin is warm and dry   Psychiatric: Normal mood and affect   Neuro exam:   Mental status:   Awake, attentive, Alert, oriented to self, place, year and month   Language function is intact   Naming, repetition and spontaneous meaningful speech expression are intact   Speech fluency is good and speech is clear   Remote and recent memory are good   No findings to suggest executive dysfuntion   Patient has adequate insight   Mood is stable   Cranial Nerves II - XII: intact   Coordination:   No intentional or positional tremor.   Motor:   Normal muscle bulk and symmetry. No fasciculations were noted   No pronator drift   Strength 5/5 shaheed   Sensory: Intact to light touch   Gait: Normal gait. Normal arm swing and turns. Uses cane   Headache Exam:   Bony prominence with tenderness over R parietal   Temples appear symmetric   No V1,V2,V3 distribution allodynia/hyperalgesia shaheed   No facial swelling   No gross TMJ abnormalities   No ptosis   No conj injection   No meningismus   No neck stiffness   No C spine tenderness   No tender points over trapezium   No supraorbital nerve exit point tenderness   No occipital  nerve exit point tenderness   No auriculotemporal nerve tenderness   Assessment:       1.  HA (headache) - likely CO2 retention    2.  Obesity    3.  VIJAYA (obstructive sleep apnea)    4.  Occipital neuralgia - improved    5.  Chronic migraine without aura, with intractable migraine, so stated, without mention of status migrainosus + tension HA    6.  Skull lesion - stable for 15 yrs      Headaches multifactorial from migraine, sinus, cervicalgia, TMJ, VIJAYA     Plan:    1, prophylactic medication - topamax 200 mg BID, Effexor, Mg; advised to begin B2 supplementation.   2, Breakthrough headache - Imitrex, tylenol    3. Voltaren gel for cervicalgia   4. Wishes to defer botox for now    Patient verbalized understanding to plan. I answered all her questions to her satisfaction.

## 2017-08-30 ENCOUNTER — TELEPHONE (OUTPATIENT)
Dept: PULMONOLOGY | Facility: CLINIC | Age: 67
End: 2017-08-30

## 2017-08-31 ENCOUNTER — OFFICE VISIT (OUTPATIENT)
Dept: RHEUMATOLOGY | Facility: CLINIC | Age: 67
End: 2017-08-31
Payer: MEDICARE

## 2017-08-31 VITALS — SYSTOLIC BLOOD PRESSURE: 139 MMHG | HEIGHT: 68 IN | HEART RATE: 102 BPM | DIASTOLIC BLOOD PRESSURE: 90 MMHG

## 2017-08-31 DIAGNOSIS — Z79.891 LONG TERM PRESCRIPTION OPIATE USE: ICD-10-CM

## 2017-08-31 DIAGNOSIS — M15.9 PRIMARY OSTEOARTHRITIS INVOLVING MULTIPLE JOINTS: Primary | ICD-10-CM

## 2017-08-31 DIAGNOSIS — M79.7 FIBROMYALGIA: ICD-10-CM

## 2017-08-31 PROCEDURE — 3080F DIAST BP >= 90 MM HG: CPT | Mod: ,,, | Performed by: INTERNAL MEDICINE

## 2017-08-31 PROCEDURE — 99213 OFFICE O/P EST LOW 20 MIN: CPT | Mod: S$PBB,,, | Performed by: INTERNAL MEDICINE

## 2017-08-31 PROCEDURE — 3075F SYST BP GE 130 - 139MM HG: CPT | Mod: ,,, | Performed by: INTERNAL MEDICINE

## 2017-08-31 PROCEDURE — 99999 PR PBB SHADOW E&M-EST. PATIENT-LVL III: CPT | Mod: PBBFAC,,, | Performed by: INTERNAL MEDICINE

## 2017-08-31 PROCEDURE — 99213 OFFICE O/P EST LOW 20 MIN: CPT | Mod: PBBFAC | Performed by: INTERNAL MEDICINE

## 2017-08-31 RX ORDER — HYDROCODONE BITARTRATE AND ACETAMINOPHEN 10; 325 MG/1; MG/1
1 TABLET ORAL EVERY 6 HOURS PRN
Qty: 120 TABLET | Refills: 0 | Status: SHIPPED | OUTPATIENT
Start: 2017-09-28 | End: 2017-08-31 | Stop reason: SDUPTHER

## 2017-08-31 RX ORDER — HYDROCODONE BITARTRATE AND ACETAMINOPHEN 10; 325 MG/1; MG/1
1 TABLET ORAL EVERY 6 HOURS PRN
Qty: 120 TABLET | Refills: 0 | Status: SHIPPED | OUTPATIENT
Start: 2017-08-31 | End: 2017-08-31 | Stop reason: SDUPTHER

## 2017-08-31 RX ORDER — HYDROCODONE BITARTRATE AND ACETAMINOPHEN 10; 325 MG/1; MG/1
1 TABLET ORAL EVERY 6 HOURS PRN
Qty: 120 TABLET | Refills: 0 | Status: SHIPPED | OUTPATIENT
Start: 2017-10-26 | End: 2017-11-28 | Stop reason: SDUPTHER

## 2017-08-31 ASSESSMENT — ROUTINE ASSESSMENT OF PATIENT INDEX DATA (RAPID3)
PAIN SCORE: 7
TOTAL RAPID3 SCORE: 6.11
MDHAQ FUNCTION SCORE: 1.6
PSYCHOLOGICAL DISTRESS SCORE: 1.1
WHEN YOU AWAKENED IN THE MORNING OVER THE LAST WEEK, PLEASE INDICATE THE AMOUNT OF TIME IT TAKES UNTIL YOU ARE AS LIMBER AS YOU WILL BE FOR THE DAY: 2 HOURS
AM STIFFNESS SCORE: 1, YES
FATIGUE SCORE: 5
PATIENT GLOBAL ASSESSMENT SCORE: 6

## 2017-09-04 NOTE — PROGRESS NOTES
History of present illness: 67-year-old female I follow for chronic pain due to osteoarthritis.  Her pain is doing about the same.  Her hands are stiff in the morning.  She has had no joint swelling.  She had no response to gabapentin and she stopped it.  She has been using a Lidoderm patch which helps her knees.  Biofreeze has been helping.  Her headaches have been improving.  Robaxin has been helping.  She is still having problems with her bladder.    Physical examination:  Musculoskeletal: She was examined in her wheelchair.  Shoulders and elbows are unremarkable.  She has bony hypertrophy of the right fifth PIP with a flexion deformity.  She has no synovitis in the hands.  She has bony hypertrophy of the knees.  Ankles and feet are unremarkable.    Assessment: Osteoarthritis    Plans: I have refilled her pain medications for the next 3 months.  She is to continue other medications as before.  I will see her in follow-up in 3 months.

## 2017-09-05 ENCOUNTER — OFFICE VISIT (OUTPATIENT)
Dept: UROLOGY | Facility: CLINIC | Age: 67
End: 2017-09-05
Payer: MEDICARE

## 2017-09-05 VITALS
DIASTOLIC BLOOD PRESSURE: 74 MMHG | BODY MASS INDEX: 44.41 KG/M2 | SYSTOLIC BLOOD PRESSURE: 126 MMHG | WEIGHT: 293 LBS | HEART RATE: 96 BPM | HEIGHT: 68 IN

## 2017-09-05 DIAGNOSIS — R33.9 URINARY RETENTION: ICD-10-CM

## 2017-09-05 DIAGNOSIS — N31.9 NEUROGENIC BLADDER: Primary | ICD-10-CM

## 2017-09-05 DIAGNOSIS — Z43.5 ENCOUNTER FOR SUPRAPUBIC CATHETER CARE: ICD-10-CM

## 2017-09-05 PROCEDURE — 3074F SYST BP LT 130 MM HG: CPT | Mod: ,,, | Performed by: NURSE PRACTITIONER

## 2017-09-05 PROCEDURE — 51705 CHANGE OF BLADDER TUBE: CPT | Mod: S$PBB,,, | Performed by: NURSE PRACTITIONER

## 2017-09-05 PROCEDURE — 99213 OFFICE O/P EST LOW 20 MIN: CPT | Mod: S$PBB,25,, | Performed by: NURSE PRACTITIONER

## 2017-09-05 PROCEDURE — 99999 PR PBB SHADOW E&M-EST. PATIENT-LVL V: CPT | Mod: PBBFAC,,, | Performed by: NURSE PRACTITIONER

## 2017-09-05 PROCEDURE — 99215 OFFICE O/P EST HI 40 MIN: CPT | Mod: PBBFAC,25 | Performed by: NURSE PRACTITIONER

## 2017-09-05 PROCEDURE — 51705 CHANGE OF BLADDER TUBE: CPT | Mod: PBBFAC | Performed by: NURSE PRACTITIONER

## 2017-09-05 PROCEDURE — 3078F DIAST BP <80 MM HG: CPT | Mod: ,,, | Performed by: NURSE PRACTITIONER

## 2017-09-05 NOTE — PROGRESS NOTES
Subjective:       Patient ID: Cecile Bowen is a 67 y.o. female.    Chief Complaint: Urinary Retention    Cecile Bowen is a 67 y.o. Female with history of bilateral hydronephrosis, incomplete bladder emptying which is managed by SPT (16fr).  Her last SPT change was 07/27/2017    Here today for scheduled SPT change.   She voices of some lower abdomen tenderness.  SPT is draining well.  No fever, n/v    Good urine flow through SPT.  She reports was in the ER 06/24/2017 for headache and elevated BP.  Was not admitted.   She was started on cipro 2/2 UTI; currently taking them.  06/24/2017 Urine Culture, Routine  PSEUDOMONAS AERUGINOSA  >100,000 cfu/ml     Last visit with Dr. Whittington was 11/10/2015.    12/10/2015:  Procedure(s) Performed:   1. Cystoscopy with bladder botox injection  2. Silver nitrate to suprapubic catheter site  3 . Change suprapubic catheter tube            Past Medical History:    Diabetes type 2, uncontrolled                   12/12/2012    Obesity                                         12/12/2012    Hypertension                                    12/12/2012    DJD (degenerative joint disease)                12/12/2012    Hypothyroidism                                  12/12/2012    Nuclear sclerosis - Both Eyes                   3/24/2014     Migraine                                                      Past Surgical History:    TOTAL ABDOMINAL HYSTERECTOMY W/ BILATERAL SALP*  1985          GALLBLADDER SURGERY                              2006          TONSILLECTOMY, ADENOIDECTOMY                                   OVARIAN CYST SURGERY                             1985          HYSTERECTOMY                                                   DILATION AND CURETTAGE OF UTERUS                               Review of patient's family history indicates:    Diabetes                       Sister                    Kidney disease                 Sister                    ALS                             "Mother                      Comment: d.    Cancer                         Maternal Grandmother        Comment: [email or web address]    Cancer                         Paternal Grandfather        Comment: d. lung    Diabetes                       Maternal Aunt             Kidney disease                 Maternal Aunt             Diabetes                       Maternal Uncle            Amblyopia                      Neg Hx                    Blindness                      Neg Hx                    Cataracts                      Neg Hx                    Glaucoma                       Neg Hx                    Macular degeneration           Neg Hx                    Retinal detachment             Neg Hx                    Strabismus                     Neg Hx                      Social History    Marital Status:             Spouse Name:                       Years of Education:                 Number of children:               Occupational History    None on file    Social History Main Topics    Smoking Status: Never Smoker                      Smokeless Status: Never Used                        Alcohol Use: No              Drug Use: No              Sexual Activity: Not Currently           Birth Control/Protection: Abstinence    Other Topics            Concern    None on file    Social History Narrative    Single      Lives w/ sister  And brother     both are helping her during her post hosp recovery period        Allergies:  Review of patient's allergies indicates no known allergies.    Medications:  Current outpatient prescriptions:amitriptyline (ELAVIL) 10 MG tablet, TAKE 3 TABLETS BY MOUTH IN THE EVENING, Disp: 90 tablet, Rfl: 5;  aspirin (ECOTRIN) 81 MG EC tablet, Take 81 mg by mouth once daily., Disp: , Rfl: ;  BD INSULIN PEN NEEDLE UF SHORT 31 X 5/16 " Ndle, USE ONCE DAILY WITH LANTUS SOLOSTAR PEN INSULIN, Disp: 100 each, Rfl: 11  butalbital-acetaminophen-caffeine -40 mg (FIORICET, ESGIC) -40 mg per " "tablet, TAKE 1 TABLET EVERY 4 TO 6 HOURS, Disp: 30 tablet, Rfl: 0;  cyanocobalamin, vitamin B-12, (VITAMIN B-12) 2,500 mcg Subl, Place 2,500 mcg under the tongue once daily., Disp: , Rfl: ;  diphenhydrAMINE (BENADRYL) 25 mg capsule, Take 25 mg by mouth every 6 (six) hours as needed., Disp: , Rfl:   ferrous sulfate 325 (65 FE) MG EC tablet, Take 325 mg by mouth 3 (three) times daily with meals., Disp: , Rfl: ;  fluticasone (FLONASE) 50 mcg/actuation nasal spray, 1 SPRAY IN EACH NOSTRIL DAILY (Patient taking differently: 1 SPRAY IN EACH NOSTRIL DAILY PRN), Disp: 16 g, Rfl: 1;  furosemide (LASIX) 20 MG tablet, Take 1 tablet (20 mg total) by mouth once daily., Disp: 4 tablet, Rfl: 0  gemfibrozil (LOPID) 600 MG tablet, TAKE 1 TABLET BY MOUTH TWICE A DAY, Disp: 60 tablet, Rfl: 5;  (START ON 4/29/2015) hydrocodone-acetaminophen 10-325mg (NORCO)  mg Tab, Take 1 tablet by mouth every 6 (six) hours as needed., Disp: 120 tablet, Rfl: 0;  insulin lispro (HUMALOG) 100 unit/mL injection, Inject 7 Units into the skin 3 (three) times daily before meals., Disp: 6.3 mL, Rfl: 11  insulin syringe-needle U-100 (BD INSULIN SYRINGE ULTRA-FINE) 1/2 mL 31 x 15/64" Syrg, 1 Box by Misc.(Non-Drug; Combo Route) route 4 (four) times daily., Disp: 100 Syringe, Rfl: 12;  lancets (ONE TOUCH DELICA LANCETS) Misc, Ultra 2 lancets to check blood sugar BID, Disp: 100 each, Rfl: 6;  LANTUS SOLOSTAR 100 unit/mL (3 mL) InPn pen, , Disp: , Rfl: 3  LIDODERM 5 %(700 mg/patch), APPLY 1 PATCH TO SKIN DAILY (LEAVING ON FOR 12 HOURS AND OFF FOR 12 HOURS), Disp: 30 patch, Rfl: 6;  magnesium oxide (MAG-OX) 400 mg tablet, TAKE 1 TABLET (400 MG TOTAL) BY MOUTH 2 (TWO) TIMES DAILY., Disp: 60 tablet, Rfl: 11;  methocarbamol (ROBAXIN) 750 MG Tab, TAKE 1 TO 2 TABLETS BY MOUTH 3 TIMES A DAY (Patient taking differently: Take 2 tablets twice daily), Disp: 120 tablet, Rfl: 3  methocarbamol (ROBAXIN) 750 MG Tab, TAKE 1 TO 2 TABLETS BY MOUTH 3 TIMES A DAY, Disp: 120 " tablet, Rfl: 3;  methylPREDNISolone (MEDROL DOSEPACK) 4 mg tablet, use as directed, Disp: 21 tablet, Rfl: 0;  multivitamin with minerals tablet, Take 1 tablet by mouth once daily., Disp: , Rfl: ;  pravastatin (PRAVACHOL) 40 MG tablet, TAKE 1 TABLET BY MOUTH EVERY DAY, Disp: 30 tablet, Rfl: 2  pyridoxine (B-6) 100 MG Tab, Take 1 tablet (100 mg total) by mouth once daily., Disp: 30 tablet, Rfl: 12;  scopolamine (TRANSDERM-SCOP) 1.5 mg, Place 1 patch (1.5 mg total) onto the skin every 72 hours., Disp: 4 patch, Rfl: 0;  sulfamethoxazole-trimethoprim 800-160mg (BACTRIM DS) 800-160 mg Tab, Take 1 tablet by mouth 2 (two) times daily., Disp: , Rfl: 0  sumatriptan (IMITREX) 100 MG tablet, TAKE 1 TABLET (100 MG TOTAL) BY MOUTH ONCE., Disp: 9 tablet, Rfl: 6;  SYNTHROID 125 mcg tablet, TAKE 1 TABLET BY MOUTH DAILY, Disp: 90 tablet, Rfl: 4;  topiramate (TOPAMAX) 100 MG tablet, TAKE 1 TABLET (100 MG TOTAL) BY MOUTH 2 (TWO) TIMES DAILY., Disp: 60 tablet, Rfl: 11;  topiramate (TOPAMAX) 25 MG tablet, Take 4 tablets (100 mg total) by mouth 2 (two) times daily., Disp: 240 tablet, Rfl: 5  venlafaxine (EFFEXOR-XR) 150 MG Cp24, TAKE 1 CAPSULE BY MOUTH ONCE DAILY, Disp: 30 capsule, Rfl: 2;  vitamin D (VITAMIN D3) 185 MG Tab, Take 185 mg by mouth once daily., Disp: , Rfl:           Review of Systems   Constitutional: Negative.  Negative for activity change, appetite change, chills and fever.   HENT: Negative for congestion, facial swelling, mouth sores, rhinorrhea, sore throat and trouble swallowing.    Eyes: Negative for photophobia, discharge and visual disturbance.   Respiratory: Negative for apnea, cough, chest tightness and wheezing.    Cardiovascular: Negative for chest pain and palpitations.   Gastrointestinal: Negative for abdominal pain, anal bleeding, constipation, diarrhea, nausea and vomiting.   Genitourinary: Positive for difficulty urinating. Negative for decreased urine volume, dysuria, flank pain, frequency, hematuria,  nocturia, pelvic pain and urgency.        SPT draining well     Musculoskeletal: Positive for arthralgias and gait problem. Negative for back pain, neck pain and neck stiffness.   Skin: Negative.  Negative for rash.   Neurological: Negative for dizziness, seizures, speech difficulty, weakness and headaches.   Psychiatric/Behavioral: Negative for agitation, confusion, self-injury, sleep disturbance and suicidal ideas. The patient is not nervous/anxious.        Objective:      Physical Exam   Nursing note and vitals reviewed.  Constitutional: She is oriented to person, place, and time. Vital signs are normal. She appears well-developed and well-nourished. She is active and cooperative.   HENT:   Head: Normocephalic.   Right Ear: External ear normal.   Left Ear: External ear normal.   Nose: Nose normal.   Eyes: Conjunctivae and lids are normal. Right eye exhibits no discharge. Left eye exhibits no discharge. No scleral icterus.   Neck: Trachea normal and normal range of motion. Neck supple. No tracheal deviation present.   Cardiovascular: Normal rate, regular rhythm and intact distal pulses.    Pulmonary/Chest: Effort normal. No respiratory distress.   Abdominal: Soft. Normal appearance and bowel sounds are normal. She exhibits no distension and no mass. There is no tenderness. There is no rebound, no guarding and no CVA tenderness.       Musculoskeletal: Normal range of motion. She exhibits no edema.   Neurological: She is alert and oriented to person, place, and time.   Skin: Skin is warm, dry and intact.         Assessment:       1. Neurogenic bladder    2. Urinary retention    3. Encounter for suprapubic catheter care        Plan:         I spent 20 minutes with the patient of which more than half was spent in direct consultation with the patient in regards to our treatment and plan.    Education and recommendations of today's plan of care including home remedies.  Discussed findings  Removed old SPT.  New 16fr SPT  placed using sterile technique.  I irrigated the bladder to verify the position.  Balloon inflated 8cc sterile water; tolerated.  RTC one month.

## 2017-09-06 ENCOUNTER — PATIENT MESSAGE (OUTPATIENT)
Dept: UROLOGY | Facility: CLINIC | Age: 67
End: 2017-09-06

## 2017-09-07 ENCOUNTER — TELEPHONE (OUTPATIENT)
Dept: PHARMACY | Facility: CLINIC | Age: 67
End: 2017-09-07

## 2017-09-08 ENCOUNTER — TELEPHONE (OUTPATIENT)
Dept: DIABETES | Facility: CLINIC | Age: 67
End: 2017-09-08

## 2017-09-08 NOTE — TELEPHONE ENCOUNTER
----- Message from Marycruz Sena sent at 9/8/2017  2:33 PM CDT -----  Contact: Pt  Pt is calling to verify that it is okay for brother to  supplies.  Pt can be reached at [phone].    Thank you

## 2017-09-10 ENCOUNTER — PATIENT MESSAGE (OUTPATIENT)
Dept: UROLOGY | Facility: CLINIC | Age: 67
End: 2017-09-10

## 2017-09-10 DIAGNOSIS — R82.71 BACTERIA IN URINE: Primary | ICD-10-CM

## 2017-09-14 RX ORDER — GEMFIBROZIL 600 MG/1
600 TABLET, FILM COATED ORAL EVERY OTHER DAY
Qty: 30 TABLET | Refills: 6 | Status: CANCELLED | OUTPATIENT
Start: 2017-09-14

## 2017-09-14 NOTE — TELEPHONE ENCOUNTER
----- Message from Kary Jiménez sent at 9/14/2017 12:08 PM CDT -----  Contact: [phone]  Patient would like to know if she wants her to stop   taking Gemfribrozil? Please call and advise. No refills.

## 2017-09-14 NOTE — TELEPHONE ENCOUNTER
EPIC now showing contraindication w/   gemfibrozil and CKD 4  Will send note to her Nephrologist, to see if safe to take

## 2017-09-14 NOTE — TELEPHONE ENCOUNTER
Spoke to pt. Pt stated that she does not want to stop the medication. She stated that the pharmacy told her that we would not refill it. I advised the pt that we have not received the request.  Ready for eRx.

## 2017-09-14 NOTE — TELEPHONE ENCOUNTER
Traced back in chart ~ 10yrs+    gemfibriazol used to tx-Triglycerides     Noted to have been as high as 357  Last check 169 ( normal ,150)    And triglycerides contribute to elevated BS    Any particular reason she wants to d/c?  Would advise against stopping

## 2017-09-14 NOTE — TELEPHONE ENCOUNTER
Spoke to pt. Pt informed of that the medication needs to be cleared by her nephrologist.  Pt ok with waiting to hear back later.

## 2017-09-15 RX ORDER — GEMFIBROZIL 600 MG/1
TABLET, FILM COATED ORAL
Qty: 60 TABLET | Refills: 3 | Status: SHIPPED | OUTPATIENT
Start: 2017-09-15 | End: 2018-11-19 | Stop reason: SDUPTHER

## 2017-09-18 ENCOUNTER — PATIENT MESSAGE (OUTPATIENT)
Dept: UROLOGY | Facility: CLINIC | Age: 67
End: 2017-09-18

## 2017-09-18 RX ORDER — CIPROFLOXACIN 500 MG/1
500 TABLET ORAL 2 TIMES DAILY
Qty: 20 TABLET | Refills: 1 | Status: SHIPPED | OUTPATIENT
Start: 2017-09-18 | End: 2017-09-28

## 2017-09-21 ENCOUNTER — TELEPHONE (OUTPATIENT)
Dept: INTERNAL MEDICINE | Facility: CLINIC | Age: 67
End: 2017-09-21

## 2017-09-21 ENCOUNTER — LAB VISIT (OUTPATIENT)
Dept: LAB | Facility: HOSPITAL | Age: 67
End: 2017-09-21
Attending: INTERNAL MEDICINE
Payer: MEDICARE

## 2017-09-21 DIAGNOSIS — D50.9 IRON DEFICIENCY ANEMIA, UNSPECIFIED IRON DEFICIENCY ANEMIA TYPE: ICD-10-CM

## 2017-09-21 DIAGNOSIS — Z00.00 PREVENTATIVE HEALTH CARE: ICD-10-CM

## 2017-09-21 DIAGNOSIS — E55.9 VITAMIN D DEFICIENCY DISEASE: ICD-10-CM

## 2017-09-21 DIAGNOSIS — E78.5 HYPERLIPIDEMIA, UNSPECIFIED HYPERLIPIDEMIA TYPE: ICD-10-CM

## 2017-09-21 DIAGNOSIS — E03.9 HYPOTHYROIDISM, ADULT: ICD-10-CM

## 2017-09-21 DIAGNOSIS — I10 UNSPECIFIED ESSENTIAL HYPERTENSION: ICD-10-CM

## 2017-09-21 LAB
25(OH)D3+25(OH)D2 SERPL-MCNC: 36 NG/ML
ALBUMIN SERPL BCP-MCNC: 2.9 G/DL
ALP SERPL-CCNC: 179 U/L
ALT SERPL W/O P-5'-P-CCNC: 10 U/L
ANION GAP SERPL CALC-SCNC: 13 MMOL/L
AST SERPL-CCNC: 11 U/L
BASOPHILS # BLD AUTO: 0.02 K/UL
BASOPHILS NFR BLD: 0.2 %
BILIRUB SERPL-MCNC: 0.3 MG/DL
BUN SERPL-MCNC: 28 MG/DL
CALCIUM SERPL-MCNC: 9.5 MG/DL
CHLORIDE SERPL-SCNC: 107 MMOL/L
CHOLEST SERPL-MCNC: 154 MG/DL
CHOLEST/HDLC SERPL: 4.1 {RATIO}
CO2 SERPL-SCNC: 17 MMOL/L
CREAT SERPL-MCNC: 2.2 MG/DL
DIFFERENTIAL METHOD: NORMAL
EOSINOPHIL # BLD AUTO: 0.3 K/UL
EOSINOPHIL NFR BLD: 3.5 %
ERYTHROCYTE [DISTWIDTH] IN BLOOD BY AUTOMATED COUNT: 13 %
EST. GFR  (AFRICAN AMERICAN): 26.3 ML/MIN/1.73 M^2
EST. GFR  (NON AFRICAN AMERICAN): 22.8 ML/MIN/1.73 M^2
ESTIMATED AVG GLUCOSE: 169 MG/DL
GLUCOSE SERPL-MCNC: 188 MG/DL
HBA1C MFR BLD HPLC: 7.5 %
HCT VFR BLD AUTO: 38.6 %
HDLC SERPL-MCNC: 38 MG/DL
HDLC SERPL: 24.7 %
HGB BLD-MCNC: 12.7 G/DL
IRON SERPL-MCNC: 40 UG/DL
LDLC SERPL CALC-MCNC: 76.8 MG/DL
LYMPHOCYTES # BLD AUTO: 1.8 K/UL
LYMPHOCYTES NFR BLD: 22.1 %
MCH RBC QN AUTO: 28.7 PG
MCHC RBC AUTO-ENTMCNC: 32.9 G/DL
MCV RBC AUTO: 87 FL
MONOCYTES # BLD AUTO: 0.8 K/UL
MONOCYTES NFR BLD: 9.1 %
NEUTROPHILS # BLD AUTO: 5.3 K/UL
NEUTROPHILS NFR BLD: 64.9 %
NONHDLC SERPL-MCNC: 116 MG/DL
PLATELET # BLD AUTO: 326 K/UL
PMV BLD AUTO: 9.6 FL
POTASSIUM SERPL-SCNC: 3.9 MMOL/L
PROT SERPL-MCNC: 7.3 G/DL
RBC # BLD AUTO: 4.43 M/UL
SATURATED IRON: 16 %
SODIUM SERPL-SCNC: 137 MMOL/L
T4 FREE SERPL-MCNC: 1.27 NG/DL
TOTAL IRON BINDING CAPACITY: 256 UG/DL
TRANSFERRIN SERPL-MCNC: 173 MG/DL
TRIGL SERPL-MCNC: 196 MG/DL
TSH SERPL DL<=0.005 MIU/L-ACNC: <0.01 UIU/ML
WBC # BLD AUTO: 8.22 K/UL

## 2017-09-21 PROCEDURE — 84439 ASSAY OF FREE THYROXINE: CPT

## 2017-09-21 PROCEDURE — 83036 HEMOGLOBIN GLYCOSYLATED A1C: CPT

## 2017-09-21 PROCEDURE — 84443 ASSAY THYROID STIM HORMONE: CPT

## 2017-09-21 PROCEDURE — 83540 ASSAY OF IRON: CPT

## 2017-09-21 PROCEDURE — 82306 VITAMIN D 25 HYDROXY: CPT

## 2017-09-21 PROCEDURE — 80061 LIPID PANEL: CPT

## 2017-09-21 PROCEDURE — 85025 COMPLETE CBC W/AUTO DIFF WBC: CPT

## 2017-09-21 PROCEDURE — 80053 COMPREHEN METABOLIC PANEL: CPT

## 2017-09-21 NOTE — TELEPHONE ENCOUNTER
----- Message from Lurdes Navarro MD sent at 9/21/2017  2:29 PM CDT -----  Please review your lab work and we will discuss at your pending office visit.  Lurdes Cancino

## 2017-09-27 ENCOUNTER — OFFICE VISIT (OUTPATIENT)
Dept: INTERNAL MEDICINE | Facility: CLINIC | Age: 67
End: 2017-09-27
Payer: MEDICARE

## 2017-09-27 VITALS
SYSTOLIC BLOOD PRESSURE: 136 MMHG | DIASTOLIC BLOOD PRESSURE: 80 MMHG | TEMPERATURE: 98 F | HEART RATE: 104 BPM | HEIGHT: 68 IN | RESPIRATION RATE: 18 BRPM | WEIGHT: 293 LBS | BODY MASS INDEX: 44.41 KG/M2

## 2017-09-27 DIAGNOSIS — E78.5 HYPERLIPIDEMIA, UNSPECIFIED HYPERLIPIDEMIA TYPE: ICD-10-CM

## 2017-09-27 DIAGNOSIS — I10 ESSENTIAL HYPERTENSION: ICD-10-CM

## 2017-09-27 DIAGNOSIS — E03.9 ACQUIRED HYPOTHYROIDISM: ICD-10-CM

## 2017-09-27 DIAGNOSIS — Z23 NEED FOR VACCINATION WITH 13-POLYVALENT PNEUMOCOCCAL CONJUGATE VACCINE: ICD-10-CM

## 2017-09-27 DIAGNOSIS — N18.4 CKD (CHRONIC KIDNEY DISEASE), STAGE IV: ICD-10-CM

## 2017-09-27 PROCEDURE — 99215 OFFICE O/P EST HI 40 MIN: CPT | Mod: S$PBB,,, | Performed by: INTERNAL MEDICINE

## 2017-09-27 PROCEDURE — 90670 PCV13 VACCINE IM: CPT | Mod: PBBFAC,PO

## 2017-09-27 PROCEDURE — G0008 ADMIN INFLUENZA VIRUS VAC: HCPCS | Mod: PBBFAC,PO

## 2017-09-27 PROCEDURE — 99213 OFFICE O/P EST LOW 20 MIN: CPT | Mod: PBBFAC,PO | Performed by: INTERNAL MEDICINE

## 2017-09-27 PROCEDURE — 3079F DIAST BP 80-89 MM HG: CPT | Mod: ,,, | Performed by: INTERNAL MEDICINE

## 2017-09-27 PROCEDURE — 3075F SYST BP GE 130 - 139MM HG: CPT | Mod: ,,, | Performed by: INTERNAL MEDICINE

## 2017-09-27 PROCEDURE — 99999 PR PBB SHADOW E&M-EST. PATIENT-LVL III: CPT | Mod: PBBFAC,,, | Performed by: INTERNAL MEDICINE

## 2017-09-27 RX ORDER — SCOLOPAMINE TRANSDERMAL SYSTEM 1 MG/1
1 PATCH, EXTENDED RELEASE TRANSDERMAL
Qty: 4 PATCH | Refills: 0 | Status: SHIPPED | OUTPATIENT
Start: 2017-09-27 | End: 2019-10-11 | Stop reason: SDUPTHER

## 2017-09-27 NOTE — PROGRESS NOTES
CC: f/up chronic medical problems, including DM w/ renal and neuro, CKD IV,     And risks and complications associated w/ her chronic medical conditions  And Health Maintenance    67 y.o. female w/  DM w/ neuro/renal, tx w/ lantus  20 U And Humalog 7-11 U premeal  Denied increased thirst, urination, or hypoglycemia episodes  Having difficulty w/$$ for meds  A1C ==>7.5     Dyslipidemia tx w/gemfibrozil 60mg BID   and pravastatin 40mg  Denied unusual muscle pain or chest pain  LDL==>119     CKD, IV  EGFR==>24.3  Cr==> 2.1  Denied decreased urination  ++ hydration- water primarily; drinking 2 diet cokes daily for MOELLER  tx     MOELLER, Dr. Henao  Re-evaluated her HA   Pt obtained VIJAYA screen for dx and possible CPAP       Non smoker  NonETOH    HEALTH MAINTENANCE:  Cholesterol/labs: reviewed- eGFR==>24.3 ( uli 15.4)  C-scope: Dr. Vargas, 2/2012- normal, rec 3 yrs- pt due, she will review w/ her Nephrologist  WWE:gyn   Mammo: pending, pt wants to schedule   DEXA: pending  EYE exam: UTD, summer 2014, - [place]; early cataracts  DDS exam: dentures,upper and lower    VACCINATIONS:  TDAP: pending  Pneumovax: 9/2016  Zostavax: 2013  Flu, yearly    MEDCARD: Reviewed  ROS:  No fever, chills, or night sweats  No visual disturbance or d/c  No ear or sinus pain or pressure  No dysphagia or early satiety  No chest pain or palpitations  No cough or wheezing  No PND or orthopnea  No GERD or abdominal pain  No change in bowels or blood in stool  No hematuria or dysuria;  +  Nocturia  No skin rashes or lesions  No joint swelling or erythema  No unusual HA or focal deficits  No cold or heat intolerance  No increased thirst or urination  No unusual bruising or bleeding  ADL's: decreased physically, more difficulty standing and getting dressed   but still in charge of finances, and meds ; +  mops and cleans bathrooms  Brother does cooking and his scoliosis is progressing and they have to order food often  She is trying to eat healthy,  but dificult  MOST RATE LIMIt[facility]NG [facility] IS HER [facility]  AD's: + will no living will, wants all measures done if there is hope of returning to her baseline,    no POA, go to person would be her sister   ( 3 children in family, she and sister and brother live together)  Mental Health: concern for self and siblings  Memory: concerns expressed by sibs   Nutrition: challenged  Safety: intact  Gait: Using scooter  Urinary incontinence: permanent SPT  Remainder of review negative except as previously noted    PMHX:Reviewed  PSHX: Reviewed  SHX: Reviewed      PE:  VS: As noted  GENERAL: Well developed well nourished, obses female,alert and oriented in NAD  Conversant and co-operative; pleasant as always  EYES: Conjunctiva and lids normal, sclera anicteric  ENT: Hearing intact; canals free of cerumen , TM's unremarkable  Nasal mucosa/turbinates/septum unremarkable;   oropharynx w/o lesions or stridor; mucus membranes dry  Sinus nontender  NECK: Supple w/o thyromegaly or lymphadenopathy  RESPIRATORY: Efforts are unlabored; lungs are CTAP  CARDIOVASCULAR: Heart RRR w/o mumur, gallop or rub; No carotid bruits noted  1+ pedal pulses; trace LE edema noted  GASTROINTESTINAL: Bowel sounds are present, soft, nontender, nondistended  No hepatosplenomegaly noted.  MUSCULOSKELETAL: Gait impaired, using cane  No clubbing, cyanosis, or edema noted.  NEUROLOGIC: DONALD. No tremor noted  SKIN: warm and dry;   Feet-Monofilament intact; decreased vibratory , left > right   Dystrophic nails, no ulcers; + thickened areas f skin over the anterior surface of the toes    IMPRESSION:  DM w/ neuro/renal, uncontrolled, but improved  Hypoglycemia , DM on insulin, occ    Proteinuria in DM, asx  CKD, IV, slight improvement  HTN, stable  HLD, asx   OA, multiple joints-Abnormal gait, in PT  Migraine HA, better w/ new pillow she ordered off [employer]  Hypothyroidism, asx  Urinary retention w/ permanent SPT  Memory disturbance, reported to pt by siblings and she is  aware of problems  Osteopenia, asx    PLAN:  Pneumovax  FLu vaccine  CONSIDER PMR to assess function  Vigorous hydration  Monitor BP and BS  Continue present meds  Rx Transderm scop  Rec pt to call TEO  on the Aging to see if candidate for MOW  Call prn   RTC 6 mos

## 2017-09-29 ENCOUNTER — INITIAL CONSULT (OUTPATIENT)
Dept: NEUROLOGY | Facility: CLINIC | Age: 67
End: 2017-09-29
Payer: MEDICARE

## 2017-09-29 DIAGNOSIS — F32.A DEPRESSION, UNSPECIFIED DEPRESSION TYPE: ICD-10-CM

## 2017-09-29 DIAGNOSIS — R41.3 MEMORY DIFFICULTY: ICD-10-CM

## 2017-09-29 DIAGNOSIS — F45.0 SOMATIZATION DISORDER: ICD-10-CM

## 2017-09-29 DIAGNOSIS — F43.12 CHRONIC POST-TRAUMATIC STRESS DISORDER (PTSD): ICD-10-CM

## 2017-09-29 PROCEDURE — 96119 PR NEUROPSYCH TESTING BY TECHNICIAN: CPT | Mod: PBBFAC | Performed by: CLINICAL NEUROPSYCHOLOGIST

## 2017-09-29 PROCEDURE — 90791 PSYCH DIAGNOSTIC EVALUATION: CPT | Mod: PBBFAC | Performed by: CLINICAL NEUROPSYCHOLOGIST

## 2017-09-29 PROCEDURE — 90791 PSYCH DIAGNOSTIC EVALUATION: CPT | Mod: S$PBB,,, | Performed by: CLINICAL NEUROPSYCHOLOGIST

## 2017-09-29 PROCEDURE — 99499 UNLISTED E&M SERVICE: CPT | Mod: S$PBB,,, | Performed by: CLINICAL NEUROPSYCHOLOGIST

## 2017-09-29 PROCEDURE — 96119 PR NEUROPSYCH TESTING BY TECHNICIAN: CPT | Mod: 59,S$PBB,, | Performed by: CLINICAL NEUROPSYCHOLOGIST

## 2017-09-29 PROCEDURE — 96118 PR NEUROPSYCH TESTING BY PSYCH/PHYS: CPT | Mod: PBBFAC | Performed by: CLINICAL NEUROPSYCHOLOGIST

## 2017-09-29 PROCEDURE — 96118 PR NEUROPSYCH TESTING BY PSYCH/PHYS: CPT | Mod: S$PBB,,, | Performed by: CLINICAL NEUROPSYCHOLOGIST
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
## 2017-10-02 ENCOUNTER — OFFICE VISIT (OUTPATIENT)
Dept: NEPHROLOGY | Facility: CLINIC | Age: 67
End: 2017-10-02
Payer: MEDICARE

## 2017-10-02 VITALS
BODY MASS INDEX: 44.41 KG/M2 | HEIGHT: 68 IN | WEIGHT: 293 LBS | SYSTOLIC BLOOD PRESSURE: 130 MMHG | OXYGEN SATURATION: 98 % | DIASTOLIC BLOOD PRESSURE: 78 MMHG | HEART RATE: 105 BPM

## 2017-10-02 DIAGNOSIS — E87.20 METABOLIC ACIDOSIS: ICD-10-CM

## 2017-10-02 DIAGNOSIS — N31.9 NEUROGENIC BLADDER: ICD-10-CM

## 2017-10-02 DIAGNOSIS — N25.81 SECONDARY HYPERPARATHYROIDISM, RENAL: ICD-10-CM

## 2017-10-02 DIAGNOSIS — N18.4 CKD (CHRONIC KIDNEY DISEASE), STAGE IV: Primary | ICD-10-CM

## 2017-10-02 DIAGNOSIS — I10 ESSENTIAL HYPERTENSION: ICD-10-CM

## 2017-10-02 DIAGNOSIS — E66.01 MORBID OBESITY DUE TO EXCESS CALORIES: ICD-10-CM

## 2017-10-02 DIAGNOSIS — Z93.59 CHRONIC SUPRAPUBIC CATHETER: ICD-10-CM

## 2017-10-02 DIAGNOSIS — E55.9 VITAMIN D DEFICIENCY DISEASE: ICD-10-CM

## 2017-10-02 PROCEDURE — 99214 OFFICE O/P EST MOD 30 MIN: CPT | Mod: PBBFAC,PO | Performed by: INTERNAL MEDICINE

## 2017-10-02 PROCEDURE — 99999 PR PBB SHADOW E&M-EST. PATIENT-LVL IV: CPT | Mod: PBBFAC,,, | Performed by: INTERNAL MEDICINE

## 2017-10-02 PROCEDURE — 99214 OFFICE O/P EST MOD 30 MIN: CPT | Mod: S$PBB,,, | Performed by: INTERNAL MEDICINE

## 2017-10-02 RX ORDER — SCOLOPAMINE TRANSDERMAL SYSTEM 1 MG/1
PATCH, EXTENDED RELEASE TRANSDERMAL
Refills: 0 | COMMUNITY
Start: 2017-09-27 | End: 2017-10-02 | Stop reason: SDUPTHER

## 2017-10-03 ENCOUNTER — OFFICE VISIT (OUTPATIENT)
Dept: UROLOGY | Facility: CLINIC | Age: 67
End: 2017-10-03
Payer: MEDICARE

## 2017-10-03 VITALS
DIASTOLIC BLOOD PRESSURE: 68 MMHG | HEART RATE: 103 BPM | WEIGHT: 293 LBS | SYSTOLIC BLOOD PRESSURE: 120 MMHG | BODY MASS INDEX: 41.95 KG/M2 | HEIGHT: 70 IN

## 2017-10-03 DIAGNOSIS — N31.9 NEUROGENIC BLADDER: Primary | ICD-10-CM

## 2017-10-03 DIAGNOSIS — R33.9 URINARY RETENTION: ICD-10-CM

## 2017-10-03 DIAGNOSIS — Z93.59 SUPRAPUBIC CATHETER: ICD-10-CM

## 2017-10-03 DIAGNOSIS — Z43.5 ENCOUNTER FOR SUPRAPUBIC CATHETER CARE: ICD-10-CM

## 2017-10-03 PROCEDURE — 51705 CHANGE OF BLADDER TUBE: CPT | Mod: PBBFAC | Performed by: NURSE PRACTITIONER

## 2017-10-03 PROCEDURE — 99215 OFFICE O/P EST HI 40 MIN: CPT | Mod: PBBFAC | Performed by: NURSE PRACTITIONER

## 2017-10-03 PROCEDURE — 17250 CHEM CAUT OF GRANLTJ TISSUE: CPT | Mod: S$PBB,51,, | Performed by: NURSE PRACTITIONER

## 2017-10-03 PROCEDURE — 51705 CHANGE OF BLADDER TUBE: CPT | Mod: S$PBB,,, | Performed by: NURSE PRACTITIONER

## 2017-10-03 PROCEDURE — 99999 PR PBB SHADOW E&M-EST. PATIENT-LVL V: CPT | Mod: PBBFAC,,, | Performed by: NURSE PRACTITIONER

## 2017-10-03 PROCEDURE — 99214 OFFICE O/P EST MOD 30 MIN: CPT | Mod: S$PBB,25,, | Performed by: NURSE PRACTITIONER

## 2017-10-03 NOTE — PROGRESS NOTES
Subjective:       Patient ID: Cecile Boewn is a 67 y.o. female.    Chief Complaint: Urinary Retention (Neurogenic Bladder)    Cecile Bowen is a 67 y.o. Female with history of bilateral hydronephrosis, incomplete bladder emptying which is managed by SPT (16fr).  Her last SPT change was 09/05/2017    Here today for scheduled SPT change.   She voices of some lower abdomen tenderness.  SPT is draining well.  No fever, n/v    Good urine flow through SPT.  She reports was in the ER 06/24/2017 for headache and elevated BP.  Was not admitted.   She was started on cipro 2/2 UTI; currently taking them.  06/24/2017 Urine Culture, Routine  PSEUDOMONAS AERUGINOSA  >100,000 cfu/ml     Last visit with Dr. Whittington was 11/10/2015.    12/10/2015:  Procedure(s) Performed:   1. Cystoscopy with bladder botox injection  2. Silver nitrate to suprapubic catheter site  3 . Change suprapubic catheter tube            Past Medical History:    Diabetes type 2, uncontrolled                   12/12/2012    Obesity                                         12/12/2012    Hypertension                                    12/12/2012    DJD (degenerative joint disease)                12/12/2012    Hypothyroidism                                  12/12/2012    Nuclear sclerosis - Both Eyes                   3/24/2014     Migraine                                                      Past Surgical History:    TOTAL ABDOMINAL HYSTERECTOMY W/ BILATERAL SALP*  1985          GALLBLADDER SURGERY                              2006          TONSILLECTOMY, ADENOIDECTOMY                                   OVARIAN CYST SURGERY                             1985          HYSTERECTOMY                                                   DILATION AND CURETTAGE OF UTERUS                               Review of patient's family history indicates:    Diabetes                       Sister                    Kidney disease                 Sister                    ALS         "                    Mother                      Comment: d.    Cancer                         Maternal Grandmother        Comment: león. colon    Cancer                         Paternal Grandfather        Comment: d. lung    Diabetes                       Maternal Aunt             Kidney disease                 Maternal Aunt             Diabetes                       Maternal Uncle            Amblyopia                      Neg Hx                    Blindness                      Neg Hx                    Cataracts                      Neg Hx                    Glaucoma                       Neg Hx                    Macular degeneration           Neg Hx                    Retinal detachment             Neg Hx                    Strabismus                     Neg Hx                      Social History    Marital Status:             Spouse Name:                       Years of Education:                 Number of children:               Occupational History    None on file    Social History Main Topics    Smoking Status: Never Smoker                      Smokeless Status: Never Used                        Alcohol Use: No              Drug Use: No              Sexual Activity: Not Currently           Birth Control/Protection: Abstinence    Other Topics            Concern    None on file    Social History Narrative    Single      Lives w/ sister  And brother     both are helping her during her post hosp recovery period        Allergies:  Review of patient's allergies indicates no known allergies.    Medications:  Current outpatient prescriptions:amitriptyline (ELAVIL) 10 MG tablet, TAKE 3 TABLETS BY MOUTH IN THE EVENING, Disp: 90 tablet, Rfl: 5;  aspirin (ECOTRIN) 81 MG EC tablet, Take 81 mg by mouth once daily., Disp: , Rfl: ;  BD INSULIN PEN NEEDLE UF SHORT 31 X 5/16 " Ndle, USE ONCE DAILY WITH LANTUS SOLOSTAR PEN INSULIN, Disp: 100 each, Rfl: 11  butalbital-acetaminophen-caffeine -40 mg (FIORICET, ESGIC) " "-40 mg per tablet, TAKE 1 TABLET EVERY 4 TO 6 HOURS, Disp: 30 tablet, Rfl: 0;  cyanocobalamin, vitamin B-12, (VITAMIN B-12) 2,500 mcg Subl, Place 2,500 mcg under the tongue once daily., Disp: , Rfl: ;  diphenhydrAMINE (BENADRYL) 25 mg capsule, Take 25 mg by mouth every 6 (six) hours as needed., Disp: , Rfl:   ferrous sulfate 325 (65 FE) MG EC tablet, Take 325 mg by mouth 3 (three) times daily with meals., Disp: , Rfl: ;  fluticasone (FLONASE) 50 mcg/actuation nasal spray, 1 SPRAY IN EACH NOSTRIL DAILY (Patient taking differently: 1 SPRAY IN EACH NOSTRIL DAILY PRN), Disp: 16 g, Rfl: 1;  furosemide (LASIX) 20 MG tablet, Take 1 tablet (20 mg total) by mouth once daily., Disp: 4 tablet, Rfl: 0  gemfibrozil (LOPID) 600 MG tablet, TAKE 1 TABLET BY MOUTH TWICE A DAY, Disp: 60 tablet, Rfl: 5;  (START ON 4/29/2015) hydrocodone-acetaminophen 10-325mg (NORCO)  mg Tab, Take 1 tablet by mouth every 6 (six) hours as needed., Disp: 120 tablet, Rfl: 0;  insulin lispro (HUMALOG) 100 unit/mL injection, Inject 7 Units into the skin 3 (three) times daily before meals., Disp: 6.3 mL, Rfl: 11  insulin syringe-needle U-100 (BD INSULIN SYRINGE ULTRA-FINE) 1/2 mL 31 x 15/64" Syrg, 1 Box by Misc.(Non-Drug; Combo Route) route 4 (four) times daily., Disp: 100 Syringe, Rfl: 12;  lancets (ONE TOUCH DELICA LANCETS) Misc, Ultra 2 lancets to check blood sugar BID, Disp: 100 each, Rfl: 6;  LANTUS SOLOSTAR 100 unit/mL (3 mL) InPn pen, , Disp: , Rfl: 3  LIDODERM 5 %(700 mg/patch), APPLY 1 PATCH TO SKIN DAILY (LEAVING ON FOR 12 HOURS AND OFF FOR 12 HOURS), Disp: 30 patch, Rfl: 6;  magnesium oxide (MAG-OX) 400 mg tablet, TAKE 1 TABLET (400 MG TOTAL) BY MOUTH 2 (TWO) TIMES DAILY., Disp: 60 tablet, Rfl: 11;  methocarbamol (ROBAXIN) 750 MG Tab, TAKE 1 TO 2 TABLETS BY MOUTH 3 TIMES A DAY (Patient taking differently: Take 2 tablets twice daily), Disp: 120 tablet, Rfl: 3  methocarbamol (ROBAXIN) 750 MG Tab, TAKE 1 TO 2 TABLETS BY MOUTH 3 TIMES A " DAY, Disp: 120 tablet, Rfl: 3;  methylPREDNISolone (MEDROL DOSEPACK) 4 mg tablet, use as directed, Disp: 21 tablet, Rfl: 0;  multivitamin with minerals tablet, Take 1 tablet by mouth once daily., Disp: , Rfl: ;  pravastatin (PRAVACHOL) 40 MG tablet, TAKE 1 TABLET BY MOUTH EVERY DAY, Disp: 30 tablet, Rfl: 2  pyridoxine (B-6) 100 MG Tab, Take 1 tablet (100 mg total) by mouth once daily., Disp: 30 tablet, Rfl: 12;  scopolamine (TRANSDERM-SCOP) 1.5 mg, Place 1 patch (1.5 mg total) onto the skin every 72 hours., Disp: 4 patch, Rfl: 0;  sulfamethoxazole-trimethoprim 800-160mg (BACTRIM DS) 800-160 mg Tab, Take 1 tablet by mouth 2 (two) times daily., Disp: , Rfl: 0  sumatriptan (IMITREX) 100 MG tablet, TAKE 1 TABLET (100 MG TOTAL) BY MOUTH ONCE., Disp: 9 tablet, Rfl: 6;  SYNTHROID 125 mcg tablet, TAKE 1 TABLET BY MOUTH DAILY, Disp: 90 tablet, Rfl: 4;  topiramate (TOPAMAX) 100 MG tablet, TAKE 1 TABLET (100 MG TOTAL) BY MOUTH 2 (TWO) TIMES DAILY., Disp: 60 tablet, Rfl: 11;  topiramate (TOPAMAX) 25 MG tablet, Take 4 tablets (100 mg total) by mouth 2 (two) times daily., Disp: 240 tablet, Rfl: 5  venlafaxine (EFFEXOR-XR) 150 MG Cp24, TAKE 1 CAPSULE BY MOUTH ONCE DAILY, Disp: 30 capsule, Rfl: 2;  vitamin D (VITAMIN D3) 185 MG Tab, Take 185 mg by mouth once daily., Disp: , Rfl:           Review of Systems   Constitutional: Negative.  Negative for activity change, appetite change, chills and fever.   HENT: Negative for congestion, facial swelling, mouth sores, rhinorrhea, sore throat and trouble swallowing.    Eyes: Negative for photophobia, discharge and visual disturbance.   Respiratory: Negative for apnea, cough, chest tightness and wheezing.    Cardiovascular: Negative for chest pain and palpitations.   Gastrointestinal: Negative for abdominal pain, anal bleeding, constipation, diarrhea, nausea and vomiting.   Genitourinary: Negative for decreased urine volume, difficulty urinating, dysuria, flank pain, frequency, genital  sores, hematuria, nocturia, pelvic pain, urgency and vaginal pain.        SPT draining well     Musculoskeletal: Positive for arthralgias and gait problem. Negative for back pain, neck pain and neck stiffness.   Skin: Negative.  Negative for rash.   Neurological: Negative for dizziness, seizures, speech difficulty, weakness and headaches.   Psychiatric/Behavioral: Negative for agitation, confusion, self-injury, sleep disturbance and suicidal ideas. The patient is not nervous/anxious.        Objective:      Physical Exam   Nursing note and vitals reviewed.  Constitutional: She is oriented to person, place, and time. She appears well-developed and well-nourished.   HENT:   Head: Normocephalic.   Right Ear: External ear normal.   Left Ear: External ear normal.   Nose: Nose normal.   Eyes: Conjunctivae and lids are normal. Right eye exhibits no discharge. Left eye exhibits no discharge. No scleral icterus.   Neck: Trachea normal and normal range of motion. Neck supple. No tracheal deviation present.   Cardiovascular: Normal rate, regular rhythm and intact distal pulses.    Pulmonary/Chest: Effort normal. No respiratory distress.   Abdominal: Soft. Bowel sounds are normal. She exhibits no distension and no mass. There is no tenderness. There is no rebound and no guarding.       Musculoskeletal: Normal range of motion. She exhibits no edema.   Neurological: She is alert and oriented to person, place, and time.   Skin: Skin is warm, dry and intact.     Psychiatric: She has a normal mood and affect. Her behavior is normal. Judgment and thought content normal.       Assessment:       1. Neurogenic bladder    2. Urinary retention    3. Suprapubic catheter    4. Encounter for suprapubic catheter care        Plan:         I spent 30 minutes with the patient of which more than half was spent in direct consultation with the patient in regards to our treatment and plan.    Education and recommendations of today's plan of care  including home remedies.  We discussed her daily skin care;   She still seeing Nephrology; diet modifications to improve Hgb A1c  I removed old SPT.  Silver nitrate sticks x 2 to granulating tissue at SP stoma.  I easily placed a 16fr SPT using sterile technique.   I irrigated to verify the position.  Balloon inflated with 9cc sterile water.  Tolerated well.  dsg applied.  RTC 4 weeks on a Wednesday.

## 2017-10-04 ENCOUNTER — TELEPHONE (OUTPATIENT)
Dept: NEUROLOGY | Facility: CLINIC | Age: 67
End: 2017-10-04

## 2017-10-04 NOTE — TELEPHONE ENCOUNTER
Spoke with patient and related results to her. In particular, we discussed behavioral management strategies for pain management. She reported that she experiences less pain when she is more activity (e.g., going ceramic pottery classes).     She is going to speak with Dr. Henao and her pain management team about the [facility] for behavioral strategies to manage pain.

## 2017-10-06 RX ORDER — TRAZODONE HYDROCHLORIDE 100 MG/1
TABLET ORAL
Qty: 45 TABLET | Refills: 5 | Status: SHIPPED | OUTPATIENT
Start: 2017-10-06 | End: 2017-11-29 | Stop reason: SDUPTHER

## 2017-10-06 NOTE — PROGRESS NOTES
Subjective:       Patient ID: Cecile oBwen is a 67 y.o. White female who presents for follow-up evaluation of Chronic Kidney Disease    HPI     She had an episode of gross hematuria that cleared with ABX, no fever. LE edema is about the same. Positive WOLFE. Her last Hba1c was 7.5--she attributes the increase to her UTI. No CP. No NSAID use for her chronic HAS    Review of Systems   Constitutional: Negative for activity change, appetite change, fatigue and fever.   HENT: Negative for facial swelling.    Eyes: Negative for visual disturbance.   Respiratory: Negative for shortness of breath.    Cardiovascular: Positive for leg swelling. Negative for chest pain.   Gastrointestinal: Negative for constipation and diarrhea.   Genitourinary: Negative for difficulty urinating, dysuria and hematuria.   Musculoskeletal: Positive for arthralgias and back pain.   Neurological: Negative for weakness and headaches.   Psychiatric/Behavioral: Negative for sleep disturbance.       Objective:      Physical Exam   Constitutional: She is oriented to person, place, and time. She appears well-nourished.   HENT:   Mouth/Throat: Oropharynx is clear and moist.   Neck: No JVD present.   Cardiovascular: S1 normal and S2 normal.  Exam reveals no friction rub.    Pulmonary/Chest: Breath sounds normal. She has no wheezes. She has no rales.   Abdominal: Soft.   Musculoskeletal: She exhibits edema.   Neurological: She is alert and oriented to person, place, and time.   Skin: Skin is warm and dry.   Psychiatric: She has a normal mood and affect.   Vitals reviewed.      Assessment:       1. CKD (chronic kidney disease), stage IV    2. Metabolic acidosis    3. Essential hypertension    4. Neurogenic bladder    5. Chronic suprapubic catheter    6. Secondary hyperparathyroidism, renal    7. Morbid obesity due to excess calories    8. Vitamin D deficiency disease        Plan:           CKD Stage 4 with stable kidney function and proteinuria.      Mineral and Bone Disease--PTH at goal. Continue calcitriol and D3. She is on exogenous bicarbonate for metabolic acidosis    HTN is controlled    UTI hx given suprapubic catheter--continue Urology follow up    DM--discussed that one week of infection will not effect a 3 month test    Morbid Obesity--her weight gain continues. This is very worrisome. Consider bariatric surgery      RTC 4 months

## 2017-10-13 ENCOUNTER — PATIENT MESSAGE (OUTPATIENT)
Dept: RHEUMATOLOGY | Facility: CLINIC | Age: 67
End: 2017-10-13

## 2017-10-15 DIAGNOSIS — R39.15 URINARY URGENCY: ICD-10-CM

## 2017-10-16 ENCOUNTER — PATIENT MESSAGE (OUTPATIENT)
Dept: INTERNAL MEDICINE | Facility: CLINIC | Age: 67
End: 2017-10-16

## 2017-10-16 RX ORDER — SODIUM BICARBONATE 650 MG/1
TABLET ORAL
Qty: 90 TABLET | Refills: 3 | Status: SHIPPED | OUTPATIENT
Start: 2017-10-16 | End: 2017-10-24 | Stop reason: SDUPTHER

## 2017-10-16 RX ORDER — OXYBUTYNIN CHLORIDE 10 MG/1
10 TABLET, EXTENDED RELEASE ORAL DAILY
Qty: 30 TABLET | Refills: 9 | Status: SHIPPED | OUTPATIENT
Start: 2017-10-16 | End: 2018-06-30 | Stop reason: SDUPTHER

## 2017-10-16 RX ORDER — METHOCARBAMOL 750 MG/1
TABLET, FILM COATED ORAL
Qty: 180 TABLET | Refills: 6 | Status: SHIPPED | OUTPATIENT
Start: 2017-10-16 | End: 2018-03-26 | Stop reason: SDUPTHER

## 2017-10-20 RX ORDER — ERGOCALCIFEROL 1.25 MG/1
CAPSULE ORAL
Qty: 4 CAPSULE | Refills: 0 | Status: SHIPPED | OUTPATIENT
Start: 2017-10-20 | End: 2017-11-16 | Stop reason: SDUPTHER

## 2017-10-24 RX ORDER — SODIUM BICARBONATE 650 MG/1
TABLET ORAL
Qty: 270 TABLET | Refills: 3 | Status: SHIPPED | OUTPATIENT
Start: 2017-10-24 | End: 2018-12-07 | Stop reason: SDUPTHER

## 2017-11-01 ENCOUNTER — OFFICE VISIT (OUTPATIENT)
Dept: UROLOGY | Facility: CLINIC | Age: 67
End: 2017-11-01
Payer: MEDICARE

## 2017-11-01 VITALS
SYSTOLIC BLOOD PRESSURE: 143 MMHG | HEIGHT: 68 IN | HEART RATE: 106 BPM | DIASTOLIC BLOOD PRESSURE: 65 MMHG | BODY MASS INDEX: 44.41 KG/M2 | WEIGHT: 293 LBS

## 2017-11-01 DIAGNOSIS — N31.9 NEUROGENIC BLADDER: Primary | ICD-10-CM

## 2017-11-01 DIAGNOSIS — Z43.5 ENCOUNTER FOR CARE OR REPLACEMENT OF SUPRAPUBIC TUBE: ICD-10-CM

## 2017-11-01 DIAGNOSIS — R33.9 URINARY RETENTION: ICD-10-CM

## 2017-11-01 DIAGNOSIS — Z93.59 CHRONIC SUPRAPUBIC CATHETER: ICD-10-CM

## 2017-11-01 DIAGNOSIS — L30.9 DERMATITIS: ICD-10-CM

## 2017-11-01 PROCEDURE — 99214 OFFICE O/P EST MOD 30 MIN: CPT | Mod: S$PBB,25,, | Performed by: NURSE PRACTITIONER

## 2017-11-01 PROCEDURE — 51705 CHANGE OF BLADDER TUBE: CPT | Mod: S$PBB,,, | Performed by: NURSE PRACTITIONER

## 2017-11-01 PROCEDURE — 99999 PR PBB SHADOW E&M-EST. PATIENT-LVL V: CPT | Mod: PBBFAC,,, | Performed by: NURSE PRACTITIONER

## 2017-11-01 PROCEDURE — 99215 OFFICE O/P EST HI 40 MIN: CPT | Mod: PBBFAC,25 | Performed by: NURSE PRACTITIONER

## 2017-11-01 PROCEDURE — 51705 CHANGE OF BLADDER TUBE: CPT | Mod: PBBFAC | Performed by: NURSE PRACTITIONER

## 2017-11-01 NOTE — PROGRESS NOTES
Subjective:       Patient ID: Cecile Bowen is a 67 y.o. female.    Chief Complaint: Urinary Retention (Neurogenic Bladder)    Cecile Bowen is a 67 y.o. Female with history of bilateral hydronephrosis, incomplete bladder emptying which is managed by SPT (16fr).  Her last SPT change was 10/03/2017    Here today for scheduled SPT change.   She voices of some lower abdomen tenderness.  SPT is draining well.  No fever, n/v      Last visit with Dr. Whittington was 11/10/2015.    12/10/2015:  Procedure(s) Performed:   1. Cystoscopy with bladder botox injection  2. Silver nitrate to suprapubic catheter site  3 . Change suprapubic catheter tube            Past Medical History:    Diabetes type 2, uncontrolled                   12/12/2012    Obesity                                         12/12/2012    Hypertension                                    12/12/2012    DJD (degenerative joint disease)                12/12/2012    Hypothyroidism                                  12/12/2012    Nuclear sclerosis - Both Eyes                   3/24/2014     Migraine                                                      Past Surgical History:    TOTAL ABDOMINAL HYSTERECTOMY W/ BILATERAL SALP*  1985          GALLBLADDER SURGERY                              2006          TONSILLECTOMY, ADENOIDECTOMY                                   OVARIAN CYST SURGERY                             1985          HYSTERECTOMY                                                   DILATION AND CURETTAGE OF UTERUS                               Review of patient's family history indicates:    Diabetes                       Sister                    Kidney disease                 Sister                    ALS                            Mother                      Comment: d.    Cancer                         Maternal Grandmother        Comment: d. colon    Cancer                         Paternal Grandfather        Comment: d. lung    Diabetes                        "Maternal Aunt             Kidney disease                 Maternal Aunt             Diabetes                       Maternal Uncle            Amblyopia                      Neg Hx                    Blindness                      Neg Hx                    Cataracts                      Neg Hx                    Glaucoma                       Neg Hx                    Macular degeneration           Neg Hx                    Retinal detachment             Neg Hx                    Strabismus                     Neg Hx                      Social History    Marital Status:             Spouse Name:                       Years of Education:                 Number of children:               Occupational History    None on file    Social History Main Topics    Smoking Status: Never Smoker                      Smokeless Status: Never Used                        Alcohol Use: No              Drug Use: No              Sexual Activity: Not Currently           Birth Control/Protection: Abstinence    Other Topics            Concern    None on file    Social History Narrative    Single      Lives w/ sister  And brother     both are helping her during her post hosp recovery period        Allergies:  Review of patient's allergies indicates no known allergies.    Medications:  Current outpatient prescriptions:amitriptyline (ELAVIL) 10 MG tablet, TAKE 3 TABLETS BY MOUTH IN THE EVENING, Disp: 90 tablet, Rfl: 5;  aspirin (ECOTRIN) 81 MG EC tablet, Take 81 mg by mouth once daily., Disp: , Rfl: ;  BD INSULIN PEN NEEDLE UF SHORT 31 X 5/16 " Ndle, USE ONCE DAILY WITH LANTUS SOLOSTAR PEN INSULIN, Disp: 100 each, Rfl: 11  butalbital-acetaminophen-caffeine -40 mg (FIORICET, ESGIC) -40 mg per tablet, TAKE 1 TABLET EVERY 4 TO 6 HOURS, Disp: 30 tablet, Rfl: 0;  cyanocobalamin, vitamin B-12, (VITAMIN B-12) 2,500 mcg Subl, Place 2,500 mcg under the tongue once daily., Disp: , Rfl: ;  diphenhydrAMINE (BENADRYL) 25 mg capsule, Take " "25 mg by mouth every 6 (six) hours as needed., Disp: , Rfl:   ferrous sulfate 325 (65 FE) MG EC tablet, Take 325 mg by mouth 3 (three) times daily with meals., Disp: , Rfl: ;  fluticasone (FLONASE) 50 mcg/actuation nasal spray, 1 SPRAY IN EACH NOSTRIL DAILY (Patient taking differently: 1 SPRAY IN EACH NOSTRIL DAILY PRN), Disp: 16 g, Rfl: 1;  furosemide (LASIX) 20 MG tablet, Take 1 tablet (20 mg total) by mouth once daily., Disp: 4 tablet, Rfl: 0  gemfibrozil (LOPID) 600 MG tablet, TAKE 1 TABLET BY MOUTH TWICE A DAY, Disp: 60 tablet, Rfl: 5;  (START ON 4/29/2015) hydrocodone-acetaminophen 10-325mg (NORCO)  mg Tab, Take 1 tablet by mouth every 6 (six) hours as needed., Disp: 120 tablet, Rfl: 0;  insulin lispro (HUMALOG) 100 unit/mL injection, Inject 7 Units into the skin 3 (three) times daily before meals., Disp: 6.3 mL, Rfl: 11  insulin syringe-needle U-100 (BD INSULIN SYRINGE ULTRA-FINE) 1/2 mL 31 x 15/64" Syrg, 1 Box by Misc.(Non-Drug; Combo Route) route 4 (four) times daily., Disp: 100 Syringe, Rfl: 12;  lancets (ONE TOUCH DELICA LANCETS) Misc, Ultra 2 lancets to check blood sugar BID, Disp: 100 each, Rfl: 6;  LANTUS SOLOSTAR 100 unit/mL (3 mL) InPn pen, , Disp: , Rfl: 3  LIDODERM 5 %(700 mg/patch), APPLY 1 PATCH TO SKIN DAILY (LEAVING ON FOR 12 HOURS AND OFF FOR 12 HOURS), Disp: 30 patch, Rfl: 6;  magnesium oxide (MAG-OX) 400 mg tablet, TAKE 1 TABLET (400 MG TOTAL) BY MOUTH 2 (TWO) TIMES DAILY., Disp: 60 tablet, Rfl: 11;  methocarbamol (ROBAXIN) 750 MG Tab, TAKE 1 TO 2 TABLETS BY MOUTH 3 TIMES A DAY (Patient taking differently: Take 2 tablets twice daily), Disp: 120 tablet, Rfl: 3  methocarbamol (ROBAXIN) 750 MG Tab, TAKE 1 TO 2 TABLETS BY MOUTH 3 TIMES A DAY, Disp: 120 tablet, Rfl: 3;  methylPREDNISolone (MEDROL DOSEPACK) 4 mg tablet, use as directed, Disp: 21 tablet, Rfl: 0;  multivitamin with minerals tablet, Take 1 tablet by mouth once daily., Disp: , Rfl: ;  pravastatin (PRAVACHOL) 40 MG tablet, TAKE 1 " TABLET BY MOUTH EVERY DAY, Disp: 30 tablet, Rfl: 2  pyridoxine (B-6) 100 MG Tab, Take 1 tablet (100 mg total) by mouth once daily., Disp: 30 tablet, Rfl: 12;  scopolamine (TRANSDERM-SCOP) 1.5 mg, Place 1 patch (1.5 mg total) onto the skin every 72 hours., Disp: 4 patch, Rfl: 0;  sulfamethoxazole-trimethoprim 800-160mg (BACTRIM DS) 800-160 mg Tab, Take 1 tablet by mouth 2 (two) times daily., Disp: , Rfl: 0  sumatriptan (IMITREX) 100 MG tablet, TAKE 1 TABLET (100 MG TOTAL) BY MOUTH ONCE., Disp: 9 tablet, Rfl: 6;  SYNTHROID 125 mcg tablet, TAKE 1 TABLET BY MOUTH DAILY, Disp: 90 tablet, Rfl: 4;  topiramate (TOPAMAX) 100 MG tablet, TAKE 1 TABLET (100 MG TOTAL) BY MOUTH 2 (TWO) TIMES DAILY., Disp: 60 tablet, Rfl: 11;  topiramate (TOPAMAX) 25 MG tablet, Take 4 tablets (100 mg total) by mouth 2 (two) times daily., Disp: 240 tablet, Rfl: 5  venlafaxine (EFFEXOR-XR) 150 MG Cp24, TAKE 1 CAPSULE BY MOUTH ONCE DAILY, Disp: 30 capsule, Rfl: 2;  vitamin D (VITAMIN D3) 185 MG Tab, Take 185 mg by mouth once daily., Disp: , Rfl:           Review of Systems   Constitutional: Negative.  Negative for activity change, appetite change, chills and fever.   HENT: Negative for congestion, facial swelling, mouth sores, rhinorrhea, sore throat and trouble swallowing.    Eyes: Negative for photophobia, discharge and visual disturbance.   Respiratory: Negative for apnea, cough, chest tightness and wheezing.    Cardiovascular: Negative for chest pain and palpitations.   Gastrointestinal: Negative for abdominal pain, anal bleeding, constipation, diarrhea, nausea and vomiting.   Genitourinary: Positive for difficulty urinating. Negative for decreased urine volume, dysuria, flank pain, frequency, hematuria, nocturia, pelvic pain and urgency.        SPT draining well.     Musculoskeletal: Positive for arthralgias and gait problem. Negative for back pain, neck pain and neck stiffness.   Skin: Negative.  Negative for rash.   Neurological: Negative for  dizziness, seizures, speech difficulty, weakness and headaches.   Psychiatric/Behavioral: Negative for agitation, confusion, self-injury, sleep disturbance and suicidal ideas. The patient is not nervous/anxious.        Objective:      Physical Exam   Nursing note and vitals reviewed.  Constitutional: She is oriented to person, place, and time. She appears well-developed and well-nourished.   HENT:   Head: Normocephalic.   Right Ear: External ear normal.   Left Ear: External ear normal.   Nose: Nose normal.   Eyes: Conjunctivae and lids are normal. Right eye exhibits no discharge. Left eye exhibits no discharge. No scleral icterus.   Neck: Trachea normal and normal range of motion. Neck supple. No tracheal deviation present.   Cardiovascular: Normal rate, regular rhythm and intact distal pulses.    Pulmonary/Chest: Effort normal. No respiratory distress.   Abdominal: Soft. Bowel sounds are normal. She exhibits no distension and no mass. There is no tenderness. There is no rebound and no guarding.       Musculoskeletal: Normal range of motion. She exhibits no edema.   Neurological: She is alert and oriented to person, place, and time.   Skin: Skin is warm, dry and intact.         Assessment:       1. Neurogenic bladder    2. Urinary retention    3. Chronic suprapubic catheter    4. Encounter for care or replacement of suprapubic tube    5. Dermatitis        Plan:         I spent 25 minutes with the patient of which more than half was spent in direct consultation with the patient in regards to our treatment and plan.    Education and recommendations of today's plan of care including home remedies.  We discussed today's findings  Daily skin care/protection  I removed her old SPT then replaced with a new 16fr SPT using sterile technique.  I irrigated the bladder to verify the position then inflated the balloon with 8cc sterile water.  RTC one month for exchange

## 2017-11-16 RX ORDER — ERGOCALCIFEROL 1.25 MG/1
CAPSULE ORAL
Qty: 4 CAPSULE | Refills: 0 | Status: SHIPPED | OUTPATIENT
Start: 2017-11-16 | End: 2017-12-16 | Stop reason: SDUPTHER

## 2017-11-27 RX ORDER — LANOLIN ALCOHOL/MO/W.PET/CERES
CREAM (GRAM) TOPICAL
Qty: 180 TABLET | Refills: 3 | Status: SHIPPED | OUTPATIENT
Start: 2017-11-27 | End: 2018-12-07 | Stop reason: SDUPTHER

## 2017-11-28 ENCOUNTER — PATIENT MESSAGE (OUTPATIENT)
Dept: ENDOCRINOLOGY | Facility: CLINIC | Age: 67
End: 2017-11-28

## 2017-11-28 RX ORDER — LEVOTHYROXINE SODIUM 50 UG/1
50 TABLET ORAL DAILY
Qty: 30 TABLET | Refills: 11 | Status: SHIPPED | OUTPATIENT
Start: 2017-11-28 | End: 2018-12-19 | Stop reason: SDUPTHER

## 2017-11-28 RX ORDER — HYDROCODONE BITARTRATE AND ACETAMINOPHEN 10; 325 MG/1; MG/1
1 TABLET ORAL EVERY 6 HOURS PRN
Qty: 120 TABLET | Refills: 0 | Status: SHIPPED | OUTPATIENT
Start: 2017-11-28 | End: 2017-12-14 | Stop reason: SDUPTHER

## 2017-11-29 RX ORDER — VENLAFAXINE HYDROCHLORIDE 75 MG/1
150 CAPSULE, EXTENDED RELEASE ORAL DAILY
Qty: 180 CAPSULE | Refills: 1 | Status: SHIPPED | OUTPATIENT
Start: 2017-11-29 | End: 2018-06-16 | Stop reason: SDUPTHER

## 2017-11-29 RX ORDER — TRAZODONE HYDROCHLORIDE 100 MG/1
TABLET ORAL
Qty: 135 TABLET | Refills: 1 | Status: SHIPPED | OUTPATIENT
Start: 2017-11-29 | End: 2017-12-14

## 2017-12-01 ENCOUNTER — OFFICE VISIT (OUTPATIENT)
Dept: UROLOGY | Facility: CLINIC | Age: 67
End: 2017-12-01
Payer: MEDICARE

## 2017-12-01 VITALS — HEART RATE: 99 BPM | SYSTOLIC BLOOD PRESSURE: 169 MMHG | DIASTOLIC BLOOD PRESSURE: 90 MMHG

## 2017-12-01 DIAGNOSIS — L92.9 GRANULATION TISSUE: ICD-10-CM

## 2017-12-01 DIAGNOSIS — N31.9 NEUROGENIC BLADDER: Primary | ICD-10-CM

## 2017-12-01 DIAGNOSIS — R33.9 URINARY RETENTION: ICD-10-CM

## 2017-12-01 DIAGNOSIS — Z43.5 ENCOUNTER FOR SUPRAPUBIC CATHETER CARE: ICD-10-CM

## 2017-12-01 DIAGNOSIS — Z93.59 CHRONIC SUPRAPUBIC CATHETER: ICD-10-CM

## 2017-12-01 PROCEDURE — 17250 CHEM CAUT OF GRANLTJ TISSUE: CPT | Mod: PBBFAC | Performed by: NURSE PRACTITIONER

## 2017-12-01 PROCEDURE — 51705 CHANGE OF BLADDER TUBE: CPT | Mod: S$PBB,,, | Performed by: NURSE PRACTITIONER

## 2017-12-01 PROCEDURE — 51705 CHANGE OF BLADDER TUBE: CPT | Mod: PBBFAC | Performed by: NURSE PRACTITIONER

## 2017-12-01 PROCEDURE — 99214 OFFICE O/P EST MOD 30 MIN: CPT | Mod: PBBFAC | Performed by: NURSE PRACTITIONER

## 2017-12-01 PROCEDURE — 17250 CHEM CAUT OF GRANLTJ TISSUE: CPT | Mod: S$PBB,51,, | Performed by: NURSE PRACTITIONER

## 2017-12-01 PROCEDURE — 99999 PR PBB SHADOW E&M-EST. PATIENT-LVL IV: CPT | Mod: PBBFAC,,, | Performed by: NURSE PRACTITIONER

## 2017-12-01 PROCEDURE — 99214 OFFICE O/P EST MOD 30 MIN: CPT | Mod: S$PBB,25,, | Performed by: NURSE PRACTITIONER

## 2017-12-01 RX ORDER — OXYBUTYNIN CHLORIDE 10 MG/1
10 TABLET, EXTENDED RELEASE ORAL DAILY
Refills: 9 | COMMUNITY
Start: 2017-11-16 | End: 2018-07-06

## 2017-12-01 NOTE — PROGRESS NOTES
Subjective:       Patient ID: Cecile Bowen is a 67 y.o. female.    Chief Complaint: Urinary Retention (Neurogenic Bladder (Chronic SPT))    Cecile Bowen is a 67 y.o. Female with history of bilateral hydronephrosis, incomplete bladder emptying which is managed by SPT (16fr).  Her last SPT change was 11/01/2017    Here today for scheduled SPT change.   She voices of some lower abdomen tenderness.  SPT is draining well.  No fever, n/v  Leaving on a cruise Sunday      Last visit with Dr. Whittington was 11/10/2015.    12/10/2015:  Procedure(s) Performed:   1. Cystoscopy with bladder botox injection  2. Silver nitrate to suprapubic catheter site  3 . Change suprapubic catheter tube            Past Medical History:    Diabetes type 2, uncontrolled                   12/12/2012    Obesity                                         12/12/2012    Hypertension                                    12/12/2012    DJD (degenerative joint disease)                12/12/2012    Hypothyroidism                                  12/12/2012    Nuclear sclerosis - Both Eyes                   3/24/2014     Migraine                                                      Past Surgical History:    TOTAL ABDOMINAL HYSTERECTOMY W/ BILATERAL SALP*  1985          GALLBLADDER SURGERY                              2006          TONSILLECTOMY, ADENOIDECTOMY                                   OVARIAN CYST SURGERY                             1985          HYSTERECTOMY                                                   DILATION AND CURETTAGE OF UTERUS                               Review of patient's family history indicates:    Diabetes                       Sister                    Kidney disease                 Sister                    ALS                            Mother                      Comment: d.    Cancer                         Maternal Grandmother        Comment: d. colon    Cancer                         Paternal Grandfather        Comment:  "d. lung    Diabetes                       Maternal Aunt             Kidney disease                 Maternal Aunt             Diabetes                       Maternal Uncle            Amblyopia                      Neg Hx                    Blindness                      Neg Hx                    Cataracts                      Neg Hx                    Glaucoma                       Neg Hx                    Macular degeneration           Neg Hx                    Retinal detachment             Neg Hx                    Strabismus                     Neg Hx                      Social History    Marital Status:             Spouse Name:                       Years of Education:                 Number of children:               Occupational History    None on file    Social History Main Topics    Smoking Status: Never Smoker                      Smokeless Status: Never Used                        Alcohol Use: No              Drug Use: No              Sexual Activity: Not Currently           Birth Control/Protection: Abstinence    Other Topics            Concern    None on file    Social History Narrative    Single      Lives w/ sister  And brother     both are helping her during her post hosp recovery period        Allergies:  Review of patient's allergies indicates no known allergies.    Medications:  Current outpatient prescriptions:amitriptyline (ELAVIL) 10 MG tablet, TAKE 3 TABLETS BY MOUTH IN THE EVENING, Disp: 90 tablet, Rfl: 5;  aspirin (ECOTRIN) 81 MG EC tablet, Take 81 mg by mouth once daily., Disp: , Rfl: ;  BD INSULIN PEN NEEDLE UF SHORT 31 X 5/16 " Ndle, USE ONCE DAILY WITH LANTUS SOLOSTAR PEN INSULIN, Disp: 100 each, Rfl: 11  butalbital-acetaminophen-caffeine -40 mg (FIORICET, ESGIC) -40 mg per tablet, TAKE 1 TABLET EVERY 4 TO 6 HOURS, Disp: 30 tablet, Rfl: 0;  cyanocobalamin, vitamin B-12, (VITAMIN B-12) 2,500 mcg Subl, Place 2,500 mcg under the tongue once daily., Disp: , Rfl: ;  " "diphenhydrAMINE (BENADRYL) 25 mg capsule, Take 25 mg by mouth every 6 (six) hours as needed., Disp: , Rfl:   ferrous sulfate 325 (65 FE) MG EC tablet, Take 325 mg by mouth 3 (three) times daily with meals., Disp: , Rfl: ;  fluticasone (FLONASE) 50 mcg/actuation nasal spray, 1 SPRAY IN EACH NOSTRIL DAILY (Patient taking differently: 1 SPRAY IN EACH NOSTRIL DAILY PRN), Disp: 16 g, Rfl: 1;  furosemide (LASIX) 20 MG tablet, Take 1 tablet (20 mg total) by mouth once daily., Disp: 4 tablet, Rfl: 0  gemfibrozil (LOPID) 600 MG tablet, TAKE 1 TABLET BY MOUTH TWICE A DAY, Disp: 60 tablet, Rfl: 5;  (START ON 4/29/2015) hydrocodone-acetaminophen 10-325mg (NORCO)  mg Tab, Take 1 tablet by mouth every 6 (six) hours as needed., Disp: 120 tablet, Rfl: 0;  insulin lispro (HUMALOG) 100 unit/mL injection, Inject 7 Units into the skin 3 (three) times daily before meals., Disp: 6.3 mL, Rfl: 11  insulin syringe-needle U-100 (BD INSULIN SYRINGE ULTRA-FINE) 1/2 mL 31 x 15/64" Syrg, 1 Box by Misc.(Non-Drug; Combo Route) route 4 (four) times daily., Disp: 100 Syringe, Rfl: 12;  lancets (ONE TOUCH DELICA LANCETS) Misc, Ultra 2 lancets to check blood sugar BID, Disp: 100 each, Rfl: 6;  LANTUS SOLOSTAR 100 unit/mL (3 mL) InPn pen, , Disp: , Rfl: 3  LIDODERM 5 %(700 mg/patch), APPLY 1 PATCH TO SKIN DAILY (LEAVING ON FOR 12 HOURS AND OFF FOR 12 HOURS), Disp: 30 patch, Rfl: 6;  magnesium oxide (MAG-OX) 400 mg tablet, TAKE 1 TABLET (400 MG TOTAL) BY MOUTH 2 (TWO) TIMES DAILY., Disp: 60 tablet, Rfl: 11;  methocarbamol (ROBAXIN) 750 MG Tab, TAKE 1 TO 2 TABLETS BY MOUTH 3 TIMES A DAY (Patient taking differently: Take 2 tablets twice daily), Disp: 120 tablet, Rfl: 3  methocarbamol (ROBAXIN) 750 MG Tab, TAKE 1 TO 2 TABLETS BY MOUTH 3 TIMES A DAY, Disp: 120 tablet, Rfl: 3;  methylPREDNISolone (MEDROL DOSEPACK) 4 mg tablet, use as directed, Disp: 21 tablet, Rfl: 0;  multivitamin with minerals tablet, Take 1 tablet by mouth once daily., Disp: , Rfl: " ;  pravastatin (PRAVACHOL) 40 MG tablet, TAKE 1 TABLET BY MOUTH EVERY DAY, Disp: 30 tablet, Rfl: 2  pyridoxine (B-6) 100 MG Tab, Take 1 tablet (100 mg total) by mouth once daily., Disp: 30 tablet, Rfl: 12;  scopolamine (TRANSDERM-SCOP) 1.5 mg, Place 1 patch (1.5 mg total) onto the skin every 72 hours., Disp: 4 patch, Rfl: 0;  sulfamethoxazole-trimethoprim 800-160mg (BACTRIM DS) 800-160 mg Tab, Take 1 tablet by mouth 2 (two) times daily., Disp: , Rfl: 0  sumatriptan (IMITREX) 100 MG tablet, TAKE 1 TABLET (100 MG TOTAL) BY MOUTH ONCE., Disp: 9 tablet, Rfl: 6;  SYNTHROID 125 mcg tablet, TAKE 1 TABLET BY MOUTH DAILY, Disp: 90 tablet, Rfl: 4;  topiramate (TOPAMAX) 100 MG tablet, TAKE 1 TABLET (100 MG TOTAL) BY MOUTH 2 (TWO) TIMES DAILY., Disp: 60 tablet, Rfl: 11;  topiramate (TOPAMAX) 25 MG tablet, Take 4 tablets (100 mg total) by mouth 2 (two) times daily., Disp: 240 tablet, Rfl: 5  venlafaxine (EFFEXOR-XR) 150 MG Cp24, TAKE 1 CAPSULE BY MOUTH ONCE DAILY, Disp: 30 capsule, Rfl: 2;  vitamin D (VITAMIN D3) 185 MG Tab, Take 185 mg by mouth once daily., Disp: , Rfl:           Review of Systems   Constitutional: Negative for activity change, appetite change, chills and fever.   HENT: Negative for congestion, facial swelling, mouth sores, rhinorrhea, sore throat and trouble swallowing.    Eyes: Negative for photophobia, discharge and visual disturbance.   Respiratory: Negative for apnea, cough, chest tightness and wheezing.    Cardiovascular: Negative for chest pain and palpitations.   Gastrointestinal: Negative for abdominal pain, anal bleeding, constipation, diarrhea, nausea and vomiting.   Genitourinary: Positive for difficulty urinating. Negative for decreased urine volume, dysuria, flank pain, frequency, hematuria, nocturia, pelvic pain and urgency.        SPT draining well     Musculoskeletal: Positive for arthralgias. Negative for back pain, gait problem, neck pain and neck stiffness.   Skin: Negative.  Negative for  rash.   Neurological: Positive for weakness. Negative for dizziness, seizures, speech difficulty and headaches.   Psychiatric/Behavioral: Negative for agitation, confusion, self-injury, sleep disturbance and suicidal ideas. The patient is not nervous/anxious.        Objective:      Physical Exam   Nursing note and vitals reviewed.  Constitutional: She is oriented to person, place, and time. She appears well-developed and well-nourished.   HENT:   Head: Normocephalic.   Right Ear: External ear normal.   Left Ear: External ear normal.   Nose: Nose normal.   Eyes: Conjunctivae and lids are normal. Right eye exhibits no discharge. Left eye exhibits no discharge. No scleral icterus.   Neck: Trachea normal and normal range of motion. Neck supple. No tracheal deviation present.   Cardiovascular: Normal rate, regular rhythm and intact distal pulses.    Pulmonary/Chest: Effort normal. No respiratory distress.   Abdominal: Soft. Bowel sounds are normal. She exhibits no distension and no mass. There is no tenderness. There is no rebound and no guarding.       Musculoskeletal: Normal range of motion. She exhibits no edema.   Neurological: She is alert and oriented to person, place, and time.   Skin: Skin is warm, dry and intact.         Assessment:       1. Neurogenic bladder    2. Chronic suprapubic catheter    3. Encounter for suprapubic catheter care    4. Urinary retention    5. Granulation tissue        Plan:         I spent 25 minutes with the patient of which more than half was spent in direct consultation with the patient in regards to our treatment and plan.    Education and recommendations of today's plan of care including home remedies.  Discussed daily care  I removed her old SPT;  Silver nitrate sticks x 2 to the granulating tissue  Placed a new 16 fr SPT easily placed using sterile technique.  I irrigated the bladder to verify the position then inflated the balloon with 8cc sterile water.  RTC one month for  exchange

## 2017-12-11 RX ORDER — INSULIN GLARGINE 100 [IU]/ML
INJECTION, SOLUTION SUBCUTANEOUS
Qty: 15 SYRINGE | Refills: 3 | Status: SHIPPED | OUTPATIENT
Start: 2017-12-11 | End: 2018-10-24

## 2017-12-14 ENCOUNTER — OFFICE VISIT (OUTPATIENT)
Dept: RHEUMATOLOGY | Facility: CLINIC | Age: 67
End: 2017-12-14
Payer: MEDICARE

## 2017-12-14 VITALS
HEIGHT: 68 IN | DIASTOLIC BLOOD PRESSURE: 91 MMHG | BODY MASS INDEX: 44.41 KG/M2 | SYSTOLIC BLOOD PRESSURE: 137 MMHG | WEIGHT: 293 LBS | HEART RATE: 55 BPM

## 2017-12-14 DIAGNOSIS — M79.7 FIBROMYALGIA: ICD-10-CM

## 2017-12-14 DIAGNOSIS — M15.9 PRIMARY OSTEOARTHRITIS INVOLVING MULTIPLE JOINTS: Primary | ICD-10-CM

## 2017-12-14 DIAGNOSIS — Z79.891 LONG TERM PRESCRIPTION OPIATE USE: ICD-10-CM

## 2017-12-14 PROCEDURE — 99999 PR PBB SHADOW E&M-EST. PATIENT-LVL III: CPT | Mod: PBBFAC,,, | Performed by: INTERNAL MEDICINE

## 2017-12-14 PROCEDURE — 99213 OFFICE O/P EST LOW 20 MIN: CPT | Mod: S$PBB,,, | Performed by: INTERNAL MEDICINE

## 2017-12-14 PROCEDURE — 99213 OFFICE O/P EST LOW 20 MIN: CPT | Mod: PBBFAC | Performed by: INTERNAL MEDICINE

## 2017-12-14 RX ORDER — HYDROCODONE BITARTRATE AND ACETAMINOPHEN 10; 325 MG/1; MG/1
1 TABLET ORAL EVERY 6 HOURS PRN
Qty: 120 TABLET | Refills: 0 | Status: SHIPPED | OUTPATIENT
Start: 2018-02-08 | End: 2018-03-15 | Stop reason: SDUPTHER

## 2017-12-14 RX ORDER — HYDROCODONE BITARTRATE AND ACETAMINOPHEN 10; 325 MG/1; MG/1
1 TABLET ORAL EVERY 6 HOURS PRN
Qty: 120 TABLET | Refills: 0 | Status: SHIPPED | OUTPATIENT
Start: 2017-12-14 | End: 2017-12-14 | Stop reason: SDUPTHER

## 2017-12-14 RX ORDER — HYDROCODONE BITARTRATE AND ACETAMINOPHEN 10; 325 MG/1; MG/1
1 TABLET ORAL EVERY 6 HOURS PRN
Qty: 120 TABLET | Refills: 0 | Status: SHIPPED | OUTPATIENT
Start: 2018-01-11 | End: 2017-12-14 | Stop reason: SDUPTHER

## 2017-12-18 RX ORDER — ERGOCALCIFEROL 1.25 MG/1
CAPSULE ORAL
Qty: 4 CAPSULE | Refills: 4 | Status: SHIPPED | OUTPATIENT
Start: 2017-12-18 | End: 2018-01-19 | Stop reason: SDUPTHER

## 2017-12-18 NOTE — PROGRESS NOTES
History of present illness: 67-year-old female with peripheral neuropathy.  She uses a motorized cart.  She has a neurogenic bladder.  I have been following her for chronic pain due to osteoarthritis.  She complains of increased pain in the lower back.  She is also had increased pain in the left knee.  She had had prior physical therapy for the back but no injections.  She has been using topical medications on her knee.  She remains on Robaxin and hydrocodone.  She was not sleeping well.  She stopped her trazodone.  She is actually sleeping some better with stopping the trazodone.  She denies other recent medical problems.    Physical examination:  Musculoskeletal: Patient was examined in her wheelchair.  She has bony hypertrophy of the knees bilaterally.  She has decreased range of motion of the left knee.  She has no effusion.    Assessment: Osteoarthritis    Plans: I am making no change in her medications at the present time.  I will see her in follow-up in 3 months.

## 2017-12-28 ENCOUNTER — OFFICE VISIT (OUTPATIENT)
Dept: NEUROLOGY | Facility: CLINIC | Age: 67
End: 2017-12-28
Payer: MEDICARE

## 2017-12-28 VITALS
HEIGHT: 68 IN | WEIGHT: 293 LBS | SYSTOLIC BLOOD PRESSURE: 138 MMHG | HEART RATE: 108 BPM | DIASTOLIC BLOOD PRESSURE: 88 MMHG | BODY MASS INDEX: 44.41 KG/M2

## 2017-12-28 DIAGNOSIS — G44.40 MEDICATION OVERUSE HEADACHE: ICD-10-CM

## 2017-12-28 DIAGNOSIS — Z74.09 MOBILITY IMPAIRED: Primary | ICD-10-CM

## 2017-12-28 DIAGNOSIS — E66.01 MORBID OBESITY DUE TO EXCESS CALORIES: ICD-10-CM

## 2017-12-28 DIAGNOSIS — M79.7 FIBROMYALGIA: ICD-10-CM

## 2017-12-28 DIAGNOSIS — G43.711 INTRACTABLE CHRONIC MIGRAINE WITHOUT AURA AND WITH STATUS MIGRAINOSUS: ICD-10-CM

## 2017-12-28 PROCEDURE — 99213 OFFICE O/P EST LOW 20 MIN: CPT | Mod: PBBFAC | Performed by: PSYCHIATRY & NEUROLOGY

## 2017-12-28 PROCEDURE — 99214 OFFICE O/P EST MOD 30 MIN: CPT | Mod: S$PBB,,, | Performed by: PSYCHIATRY & NEUROLOGY

## 2017-12-28 PROCEDURE — 99999 PR PBB SHADOW E&M-EST. PATIENT-LVL III: CPT | Mod: PBBFAC,,, | Performed by: PSYCHIATRY & NEUROLOGY

## 2017-12-28 RX ORDER — SUMATRIPTAN SUCCINATE 100 MG/1
100 TABLET ORAL 2 TIMES DAILY PRN
Qty: 9 TABLET | Refills: 11 | Status: SHIPPED | OUTPATIENT
Start: 2017-12-28 | End: 2019-06-11 | Stop reason: SDUPTHER

## 2017-12-28 NOTE — PROGRESS NOTES
Follow up :   Continues with headaches most days but are markedly less severe, may go 1-2 weeks without excedrin, continued relief from imitrex.  Had a pleasant [place] with family.     8/2017  No benefit from GBP, only 2 HA free days per month but feels generally less severe - takes excedrin migraine 1-2 tabs daily  5/2017  Continued daily headache, stopped fioricet, continued relief from imitrex, unable to comply with CPAP due to severe distress caused by objects around her face related to childhood molesation     Headache history:   Age of onset - Teens   Location - Bioccipital goes to crown   Nature of pain - Throbbing   Prodrome - no   Aura - No   Duration of headache - hrs   Time to peak intensity - 1 hr   Pain scale - range of intensity - 7-8/10   Frequency - 4/week , now 5/month   Status Migrainosus history - yes   Time of day of most headaches- anytime   Associated symptoms with the headache:   Meningeal symptoms - photophobia, phonophobia, exercise intolerance +   Nausea/vomitting +   Nasal drainage   Visual blurriness   Pallor/flushing   Dizziness +   Vertigo   Confusion   Impaired concentration +   Pain worsened with physical activity +   Neck pain +   Tension HA:   Location - Bifrontal   Nature of pain - Gripping   Prodrome - no   Aura - No   Duration of headache - hrs   Time to peak intensity - 1 hr   Pain scale - range of intensity - 6/10   Frequency - daily   Time of day of most headaches- morning   Associated symptoms with the headache: none   Headache Triggers: Heat (hot weather, hot baths or showers) , emotional stress +   Weather change +   Other comorbid conditions:   Anxiety - yes   Motion sickness symptom - yes   Treatment history:   Resolution of headache with sleep - yes   Meds tried:   Fioricet - helps   Imitrex - helps   No headache benefit from:  Elavil, effexor, namenda, Mg, robaxin, flexeril, norco, unable to take NSAIDs due to renal failure  topamax - helped with migraine      Headache risk factors:   H/o TBI - no   H/o Meningitis - no   F/h Aneurysms - no   Takes naps during the day   Denies refreshing sleep   Snoring +   Review of Systems   Constitutional: Negative.   Eyes: Negative.   Respiratory: Negative.   Cardiovascular: Negative.   Gastrointestinal: Negative.   Genitourinary: Negative.   Musculoskeletal: LBP +   Skin: Negative.   Neurological:Sleep disturbance + HA   Hematological: Negative.   Psychiatric/Behavioral: Negative.     Objective:    Physical Exam   Constitutional:   She appears well-developed and obese. She is well groomed   HENT:   Head: Atraumatic, oral and nasal mucosa intact   Mallampati - 3   Eyes: Conjunctivae and EOM are normal. Pupils are equal, round, and reactive to light OU   Neck: Neck supple. No thyromegaly present   Cardiovascular: Normal rate and normal heart sounds   No murmur heard   Pulmonary/Chest: Effort normal and breath sounds normal   Musculoskeletal: Normal range of motion. No joint stiffness. No vertebral point tenderness   Skin: Skin is warm and dry   Psychiatric: Normal mood and affect   Neuro exam:   Mental status:   Awake, attentive, Alert, oriented to self, place, year and month   Language function is intact   Naming, repetition and spontaneous meaningful speech expression are intact   Speech fluency is good and speech is clear   Remote and recent memory are good   No findings to suggest executive dysfuntion   Patient has adequate insight   Mood is stable   Cranial Nerves II - XII: intact   Coordination:   No intentional or positional tremor.   Motor:   Normal muscle bulk and symmetry. No fasciculations were noted   No pronator drift   Strength 5/5 shaheed   Sensory: Intact to light touch   Gait: Normal gait. Normal arm swing and turns. Uses cane   Headache Exam:   Bony prominence with tenderness over R parietal   Temples appear symmetric   No V1,V2,V3 distribution allodynia/hyperalgesia shaheed   No facial swelling   No gross TMJ  abnormalities   No ptosis   No conj injection   No meningismus   No neck stiffness   No C spine tenderness   No tender points over trapezium   No supraorbital nerve exit point tenderness   No occipital nerve exit point tenderness   No auriculotemporal nerve tenderness   Assessment:       1.  HA (headache) - likely CO2 retention    2.  Obesity    3.  VIJAYA (obstructive sleep apnea)    4.  Occipital neuralgia - improved    5.  Chronic migraine without aura, with intractable migraine, so stated, without mention of status migrainosus + tension HA    6.  Skull lesion - stable for 15 yrs      Headaches multifactorial from migraine, sinus, cervicalgia, TMJ, VIJAYA     Plan:    1, prophylactic medication - topamax 200 mg BID, Effexor, Mg, B2 supplementation.   2, Breakthrough headache - Imitrex, tylenol    3. Voltaren gel for cervicalgia   4. Wishes to defer botox for now  5. Referral to functional rehabilitation  6. Results of neuropsychology testing reviewed     Patient verbalized understanding to plan. I answered all her questions to her satisfaction.     Return in 6 months

## 2018-01-04 ENCOUNTER — OFFICE VISIT (OUTPATIENT)
Dept: UROLOGY | Facility: CLINIC | Age: 68
End: 2018-01-04
Payer: MEDICARE

## 2018-01-04 VITALS
DIASTOLIC BLOOD PRESSURE: 87 MMHG | WEIGHT: 293 LBS | HEART RATE: 108 BPM | HEIGHT: 68 IN | SYSTOLIC BLOOD PRESSURE: 114 MMHG | BODY MASS INDEX: 44.41 KG/M2

## 2018-01-04 DIAGNOSIS — Z43.5 ENCOUNTER FOR CARE OR REPLACEMENT OF SUPRAPUBIC TUBE: ICD-10-CM

## 2018-01-04 DIAGNOSIS — N31.9 NEUROGENIC BLADDER: Primary | ICD-10-CM

## 2018-01-04 DIAGNOSIS — Z93.59 SUPRAPUBIC CATHETER: ICD-10-CM

## 2018-01-04 PROCEDURE — 99999 PR PBB SHADOW E&M-EST. PATIENT-LVL IV: CPT | Mod: PBBFAC,,, | Performed by: NURSE PRACTITIONER

## 2018-01-04 PROCEDURE — 99214 OFFICE O/P EST MOD 30 MIN: CPT | Mod: S$PBB,25,, | Performed by: NURSE PRACTITIONER

## 2018-01-04 PROCEDURE — 51705 CHANGE OF BLADDER TUBE: CPT | Mod: S$PBB,,, | Performed by: NURSE PRACTITIONER

## 2018-01-04 PROCEDURE — 99214 OFFICE O/P EST MOD 30 MIN: CPT | Mod: PBBFAC | Performed by: NURSE PRACTITIONER

## 2018-01-04 PROCEDURE — 51705 CHANGE OF BLADDER TUBE: CPT | Mod: PBBFAC | Performed by: NURSE PRACTITIONER

## 2018-01-04 NOTE — PROGRESS NOTES
Subjective:       Patient ID: Cecile Bowen is a 67 y.o. female.    Chief Complaint: Urinary Retention (neurogenic bladder)    Cecile Bowen is a 67 y.o. Female with history of bilateral hydronephrosis, incomplete bladder emptying which is managed by SPT (16fr).  Her last SPT change was 12/01/2017    Here today for scheduled SPT change.   She voices of some lower abdomen tenderness.  SPT is draining well.  No fever, n/v  Leaving on a cruise Sunday      Last visit with Dr. Whittington was 11/10/2015.    12/10/2015:  Procedure(s) Performed:   1. Cystoscopy with bladder botox injection  2. Silver nitrate to suprapubic catheter site  3 . Change suprapubic catheter tube            Past Medical History:    Diabetes type 2, uncontrolled                   12/12/2012    Obesity                                         12/12/2012    Hypertension                                    12/12/2012    DJD (degenerative joint disease)                12/12/2012    Hypothyroidism                                  12/12/2012    Nuclear sclerosis - Both Eyes                   3/24/2014     Migraine                                                      Past Surgical History:    TOTAL ABDOMINAL HYSTERECTOMY W/ BILATERAL SALP*  1985          GALLBLADDER SURGERY                              2006          TONSILLECTOMY, ADENOIDECTOMY                                   OVARIAN CYST SURGERY                             1985          HYSTERECTOMY                                                   DILATION AND CURETTAGE OF UTERUS                               Review of patient's family history indicates:    Diabetes                       Sister                    Kidney disease                 Sister                    ALS                            Mother                      Comment: d.    Cancer                         Maternal Grandmother        Comment: d. colon    Cancer                         Paternal Grandfather        Comment: león. lung     "Diabetes                       Maternal Aunt             Kidney disease                 Maternal Aunt             Diabetes                       Maternal Uncle            Amblyopia                      Neg Hx                    Blindness                      Neg Hx                    Cataracts                      Neg Hx                    Glaucoma                       Neg Hx                    Macular degeneration           Neg Hx                    Retinal detachment             Neg Hx                    Strabismus                     Neg Hx                      Social History    Marital Status:             Spouse Name:                       Years of Education:                 Number of children:               Occupational History    None on file    Social History Main Topics    Smoking Status: Never Smoker                      Smokeless Status: Never Used                        Alcohol Use: No              Drug Use: No              Sexual Activity: Not Currently           Birth Control/Protection: Abstinence    Other Topics            Concern    None on file    Social History Narrative    Single      Lives w/ sister  And brother     both are helping her during her post hosp recovery period        Allergies:  Review of patient's allergies indicates no known allergies.    Medications:  Current outpatient prescriptions:amitriptyline (ELAVIL) 10 MG tablet, TAKE 3 TABLETS BY MOUTH IN THE EVENING, Disp: 90 tablet, Rfl: 5;  aspirin (ECOTRIN) 81 MG EC tablet, Take 81 mg by mouth once daily., Disp: , Rfl: ;  BD INSULIN PEN NEEDLE UF SHORT 31 X 5/16 " Ndle, USE ONCE DAILY WITH LANTUS SOLOSTAR PEN INSULIN, Disp: 100 each, Rfl: 11  butalbital-acetaminophen-caffeine -40 mg (FIORICET, ESGIC) -40 mg per tablet, TAKE 1 TABLET EVERY 4 TO 6 HOURS, Disp: 30 tablet, Rfl: 0;  cyanocobalamin, vitamin B-12, (VITAMIN B-12) 2,500 mcg Subl, Place 2,500 mcg under the tongue once daily., Disp: , Rfl: ;  diphenhydrAMINE " "(BENADRYL) 25 mg capsule, Take 25 mg by mouth every 6 (six) hours as needed., Disp: , Rfl:   ferrous sulfate 325 (65 FE) MG EC tablet, Take 325 mg by mouth 3 (three) times daily with meals., Disp: , Rfl: ;  fluticasone (FLONASE) 50 mcg/actuation nasal spray, 1 SPRAY IN EACH NOSTRIL DAILY (Patient taking differently: 1 SPRAY IN EACH NOSTRIL DAILY PRN), Disp: 16 g, Rfl: 1;  furosemide (LASIX) 20 MG tablet, Take 1 tablet (20 mg total) by mouth once daily., Disp: 4 tablet, Rfl: 0  gemfibrozil (LOPID) 600 MG tablet, TAKE 1 TABLET BY MOUTH TWICE A DAY, Disp: 60 tablet, Rfl: 5;  (START ON 4/29/2015) hydrocodone-acetaminophen 10-325mg (NORCO)  mg Tab, Take 1 tablet by mouth every 6 (six) hours as needed., Disp: 120 tablet, Rfl: 0;  insulin lispro (HUMALOG) 100 unit/mL injection, Inject 7 Units into the skin 3 (three) times daily before meals., Disp: 6.3 mL, Rfl: 11  insulin syringe-needle U-100 (BD INSULIN SYRINGE ULTRA-FINE) 1/2 mL 31 x 15/64" Syrg, 1 Box by Misc.(Non-Drug; Combo Route) route 4 (four) times daily., Disp: 100 Syringe, Rfl: 12;  lancets (ONE TOUCH DELICA LANCETS) Misc, Ultra 2 lancets to check blood sugar BID, Disp: 100 each, Rfl: 6;  LANTUS SOLOSTAR 100 unit/mL (3 mL) InPn pen, , Disp: , Rfl: 3  LIDODERM 5 %(700 mg/patch), APPLY 1 PATCH TO SKIN DAILY (LEAVING ON FOR 12 HOURS AND OFF FOR 12 HOURS), Disp: 30 patch, Rfl: 6;  magnesium oxide (MAG-OX) 400 mg tablet, TAKE 1 TABLET (400 MG TOTAL) BY MOUTH 2 (TWO) TIMES DAILY., Disp: 60 tablet, Rfl: 11;  methocarbamol (ROBAXIN) 750 MG Tab, TAKE 1 TO 2 TABLETS BY MOUTH 3 TIMES A DAY (Patient taking differently: Take 2 tablets twice daily), Disp: 120 tablet, Rfl: 3  methocarbamol (ROBAXIN) 750 MG Tab, TAKE 1 TO 2 TABLETS BY MOUTH 3 TIMES A DAY, Disp: 120 tablet, Rfl: 3;  methylPREDNISolone (MEDROL DOSEPACK) 4 mg tablet, use as directed, Disp: 21 tablet, Rfl: 0;  multivitamin with minerals tablet, Take 1 tablet by mouth once daily., Disp: , Rfl: ;  pravastatin " (PRAVACHOL) 40 MG tablet, TAKE 1 TABLET BY MOUTH EVERY DAY, Disp: 30 tablet, Rfl: 2  pyridoxine (B-6) 100 MG Tab, Take 1 tablet (100 mg total) by mouth once daily., Disp: 30 tablet, Rfl: 12;  scopolamine (TRANSDERM-SCOP) 1.5 mg, Place 1 patch (1.5 mg total) onto the skin every 72 hours., Disp: 4 patch, Rfl: 0;  sulfamethoxazole-trimethoprim 800-160mg (BACTRIM DS) 800-160 mg Tab, Take 1 tablet by mouth 2 (two) times daily., Disp: , Rfl: 0  sumatriptan (IMITREX) 100 MG tablet, TAKE 1 TABLET (100 MG TOTAL) BY MOUTH ONCE., Disp: 9 tablet, Rfl: 6;  SYNTHROID 125 mcg tablet, TAKE 1 TABLET BY MOUTH DAILY, Disp: 90 tablet, Rfl: 4;  topiramate (TOPAMAX) 100 MG tablet, TAKE 1 TABLET (100 MG TOTAL) BY MOUTH 2 (TWO) TIMES DAILY., Disp: 60 tablet, Rfl: 11;  topiramate (TOPAMAX) 25 MG tablet, Take 4 tablets (100 mg total) by mouth 2 (two) times daily., Disp: 240 tablet, Rfl: 5  venlafaxine (EFFEXOR-XR) 150 MG Cp24, TAKE 1 CAPSULE BY MOUTH ONCE DAILY, Disp: 30 capsule, Rfl: 2;  vitamin D (VITAMIN D3) 185 MG Tab, Take 185 mg by mouth once daily., Disp: , Rfl:           Review of Systems   Constitutional: Negative.  Negative for activity change, appetite change, chills and fever.   HENT: Negative for congestion, facial swelling, mouth sores, rhinorrhea, sore throat and trouble swallowing.    Eyes: Negative for photophobia, discharge and visual disturbance.   Respiratory: Negative for apnea, cough, chest tightness and wheezing.    Cardiovascular: Negative for chest pain and palpitations.   Gastrointestinal: Negative for abdominal pain, anal bleeding, constipation, diarrhea, nausea and vomiting.   Genitourinary: Positive for difficulty urinating. Negative for decreased urine volume, dysuria, flank pain, frequency, hematuria, nocturia, pelvic pain and urgency.        SPT draining well     Musculoskeletal: Positive for arthralgias and gait problem. Negative for back pain, neck pain and neck stiffness.   Skin: Negative.  Negative for  rash.   Neurological: Negative for dizziness, seizures, speech difficulty, weakness and headaches.   Psychiatric/Behavioral: Negative for agitation, confusion, self-injury, sleep disturbance and suicidal ideas. The patient is not nervous/anxious.        Objective:      Physical Exam   Nursing note and vitals reviewed.  Constitutional: She is oriented to person, place, and time. She appears well-developed and well-nourished.   HENT:   Head: Normocephalic.   Right Ear: External ear normal.   Left Ear: External ear normal.   Nose: Nose normal.   Eyes: Conjunctivae and lids are normal. Right eye exhibits no discharge. Left eye exhibits no discharge. No scleral icterus.   Neck: Trachea normal and normal range of motion. Neck supple. No tracheal deviation present.   Cardiovascular: Normal rate, regular rhythm and intact distal pulses.    Pulmonary/Chest: Effort normal. No respiratory distress.   Abdominal: Soft. Bowel sounds are normal. She exhibits no distension and no mass. There is no tenderness. There is no rebound and no guarding.       Musculoskeletal: Normal range of motion. She exhibits no edema.   Neurological: She is alert and oriented to person, place, and time.   Skin: Skin is warm, dry and intact.     Psychiatric: She has a normal mood and affect. Her behavior is normal. Judgment and thought content normal.       Assessment:       1. Neurogenic bladder    2. Suprapubic catheter    3. Encounter for care or replacement of suprapubic tube        Plan:              I spent 25 minutes with the patient of which more than half was spent in direct consultation with the patient in regards to our treatment and plan.    Education and recommendations of today's plan of care including home remedies.  Discussed daily care  I removed her old SPT;  Placed a new 16 fr SPT easily placed using sterile technique.  I irrigated the bladder to verify the position then inflated the balloon with 8cc sterile water.  Daily skin care  discussed  Diet modifications; low duyen/carb diet; daily exercise to help with BMI; ;   RTC one month for exchange

## 2018-01-04 NOTE — PROGRESS NOTES
Patient, Cecile Bowen (MRN #[id number]), presented with a recorded BMI of 47.14 kg/m^2 consistent with the definition of morbid obesity (ICD-10 E66.01). The patient's morbid obesity was monitored, evaluated, addressed and/or treated. This addendum to the medical record is made on 01/04/2018.

## 2018-01-08 ENCOUNTER — TELEPHONE (OUTPATIENT)
Dept: PAIN MEDICINE | Facility: OTHER | Age: 68
End: 2018-01-08

## 2018-01-08 NOTE — TELEPHONE ENCOUNTER
skp [facility] pt she was not feeling good and forgot today apptmnt rescheduled her for 1/17 @ 10:30 for a medical screening with Dr Torres

## 2018-01-10 NOTE — TELEPHONE ENCOUNTER
Unfortunately, I am in a meeting at that time.  It should have already been blocked off in the clinic.  I have an opening for Friday the 19th at 7 am

## 2018-01-19 RX ORDER — ERGOCALCIFEROL 1.25 MG/1
CAPSULE ORAL
Qty: 4 CAPSULE | Refills: 4 | Status: SHIPPED | OUTPATIENT
Start: 2018-01-19 | End: 2018-05-03 | Stop reason: SDUPTHER

## 2018-01-24 ENCOUNTER — PATIENT MESSAGE (OUTPATIENT)
Dept: ENDOCRINOLOGY | Facility: CLINIC | Age: 68
End: 2018-01-24

## 2018-01-24 RX ORDER — INSULIN LISPRO 100 [IU]/ML
INJECTION, SOLUTION INTRAVENOUS; SUBCUTANEOUS
Qty: 10 ML | Refills: 11 | Status: SHIPPED | OUTPATIENT
Start: 2018-01-24 | End: 2018-10-24

## 2018-01-25 ENCOUNTER — TELEPHONE (OUTPATIENT)
Dept: PAIN MEDICINE | Facility: OTHER | Age: 68
End: 2018-01-25

## 2018-01-26 ENCOUNTER — OFFICE VISIT (OUTPATIENT)
Dept: PAIN MEDICINE | Facility: OTHER | Age: 68
End: 2018-01-26
Attending: ANESTHESIOLOGY
Payer: MEDICARE

## 2018-01-26 VITALS
BODY MASS INDEX: 44.41 KG/M2 | WEIGHT: 293 LBS | DIASTOLIC BLOOD PRESSURE: 82 MMHG | OXYGEN SATURATION: 97 % | HEART RATE: 110 BPM | HEIGHT: 68 IN | RESPIRATION RATE: 20 BRPM | SYSTOLIC BLOOD PRESSURE: 144 MMHG

## 2018-01-26 DIAGNOSIS — M47.816 LUMBAR SPONDYLOSIS: ICD-10-CM

## 2018-01-26 DIAGNOSIS — M79.10 MYALGIA: ICD-10-CM

## 2018-01-26 DIAGNOSIS — E66.01 MORBID OBESITY WITH BMI OF 45.0-49.9, ADULT: ICD-10-CM

## 2018-01-26 DIAGNOSIS — M47.816 FACET ARTHRITIS OF LUMBAR REGION: Primary | ICD-10-CM

## 2018-01-26 DIAGNOSIS — M54.6 THORACIC SPINE PAIN: ICD-10-CM

## 2018-01-26 DIAGNOSIS — R53.81 PHYSICAL DECONDITIONING: ICD-10-CM

## 2018-01-26 PROCEDURE — 99204 OFFICE O/P NEW MOD 45 MIN: CPT | Mod: ,,, | Performed by: ANESTHESIOLOGY

## 2018-01-26 NOTE — Clinical Note
Thank you so much for your referral!  She is a sweet lady.  I am going to see if I can tune her up enough in my clinic to get her ready for FRP in the future.  Currently, she is too deconditioned to even start.  I have her scheduled to see me in pain management in 2 weeks.  Thanks again! LW

## 2018-01-26 NOTE — PROGRESS NOTES
Subjective:     Patient ID: Cecile Bowen is a 67 y.o. female.    Chief Complaint: Pain    Consulted by: Dr. Enrique Henao     Disclaimer: This note was generated using voice recognition software.  There may be a typographical errors that were missed during proofreading.      HPI:    Cecile Bowen is a 67 y.o. female who presents today with low back pain. Her back pain has been present for 20+ years.  She has been diagnosed with degenerative disc disease (20 years ago), levoscoliosis (5 years ago), OA of the spine.  Her pain has been worsening over time, worse in the last decade, and severe for the past few years.  The worst of her pain is in her low back. The pain is equal bilaterally and does not radiate.  No associated numbness, tingling, or muscle weakness.  She has noticed that she has lost some muscle tone.  This pain is described in detail below.    Physical Therapy: Yes, at [facility], most recent was 2 visits in 2014.  She did a course for a couple of months before that    Non-pharmacologic Treatment:     · Ice/Heat: Heat helps  · TENS: Not tried  · Massage: Not tried  · Chiropractic care: Not tried  · Acupuncture: Not tried  · Other: Use a scooter. Has a recliner lift. No other assistive devices         Pain Medications:         · Currently taking: Norco 10/325 mg BID-QID PRN, Topamax, Robaxin 1500 mg TID, Excedrin migraine, Imitrex, topamax, Effexor, Biofreeze    · Has tried in the past:  Flexeril, Baclofen, gabapentin, memantine, amitriptyline, Zoloft, Lidoderm patches    · Has not tried: NSAIDs, Tylenol, Lyrica, Rx topical creams    Blood thinners: None    Interventional Therapies: None    Relevant Surgeries: None    Affecting sleep? Yes, she is getting about 2-3 hours at a time    Affecting daily activities? Yes    Depressive symptoms? Treated for depression          · SI/HI? 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      Back Pain   This is a chronic problem. The current episode started more than 1 year ago. The problem occurs constantly. The problem has been rapidly worsening since onset. The pain is present in the lumbar spine, sacro-iliac and thoracic spine. The pain does not radiate (having seperate pains in Bilateral Hip and Knees). The pain is at a severity of 7/10. The pain is moderate. The pain is the same all the time. The symptoms are aggravated by standing, bending and coughing (walking, lifting and getting up from a sitting position). Associated symptoms include headaches. Pertinent negatives include no chest pain, fever or weight loss. She has tried NSAIDs, muscle relaxant, injection treatment and heat (injection treatments in knees) for the symptoms. Physical therapy was effective and ineffective.      Last 3 PDI Scores 1/26/2018   Pain Disability Index (PDI) 45       Review of Systems   Constitution: Negative for chills, fever, malaise/fatigue, weight gain and weight loss.   HENT: Negative for ear pain and hoarse voice.    Eyes: Negative for blurred vision, pain and visual disturbance.   Cardiovascular: Negative for chest pain, dyspnea on exertion and irregular heartbeat.   Respiratory: Negative for cough, shortness of breath and wheezing.    Endocrine: Negative for cold intolerance and heat intolerance.   Hematologic/Lymphatic: Negative for adenopathy and bleeding problem. Does not bruise/bleed easily.   Skin: Negative for color change, itching and rash.   Musculoskeletal: Positive for back pain, joint pain, muscle cramps and stiffness.   Gastrointestinal: Negative for change in bowel habit, diarrhea, hematemesis, hematochezia, melena and vomiting.   Genitourinary: Negative for flank pain, frequency, hematuria and urgency.   Neurological: Positive for headaches and loss of balance. Negative for difficulty with concentration, dizziness and  "seizures.   Psychiatric/Behavioral: Negative for altered mental status, depression and suicidal ideas. The patient is not nervous/anxious.    Allergic/Immunologic: Negative for HIV exposure.             Past Medical History:   Diagnosis Date    Back pain     CKD (chronic kidney disease) stage 3, GFR 30-59 ml/min     as per patient    Diabetes type 2, uncontrolled 12/12/2012    DJD (degenerative joint disease) 12/12/2012    Hypertension 12/12/2012    Hypothyroidism 12/12/2012    Migraine     Nuclear sclerosis - Both Eyes 3/24/2014    Obesity 12/12/2012       Past Surgical History:   Procedure Laterality Date    BREAST BIOPSY Right     benign    CHOLECYSTECTOMY      COLONOSCOPY N/A 1/13/2017    Procedure: COLONOSCOPY;  Surgeon: Morris Wiseman MD;  Location: [facility] ([address] [place] [address]);  Service: Endoscopy;  Laterality: N/A;  Renal pt Nephrology advised to avoid phosphate preps    DILATION AND CURETTAGE OF UTERUS      GALLBLADDER SURGERY  2006    OVARIAN CYST SURGERY  1985    spt placement      TONSILLECTOMY, ADENOIDECTOMY      TOTAL ABDOMINAL HYSTERECTOMY W/ BILATERAL SALPINGOOPHORECTOMY  1985       Review of patient's allergies indicates:  No Known Allergies    Current Outpatient Prescriptions   Medication Sig Dispense Refill    aspirin-acetaminophen-caffeine 250-250-65 mg (EXCEDRIN MIGRAINE) 250-250-65 mg per tablet Take 1 tablet by mouth every 6 (six) hours as needed for Pain.      BD INSULIN PEN NEEDLE UF SHORT 31 gauge x 5/16" Ndle USE ONCE DAILY WITH LANTUS SOLOSTAR PEN INSULIN 150 each 3    BD INSULIN SYRINGE ULTRA-FINE 1/2 mL 31 gauge x 15/64" Syrg USE WITH INSULIN 4 TIMES A  Syringe 4    cyanocobalamin, vitamin B-12, (VITAMIN B-12) 5,000 mcg Subl Place 1 tablet under the tongue once daily.      ergocalciferol (VITAMIN D2) 50,000 unit Cap TAKE 1 CAPSULE (50,000 UNITS TOTAL) BY MOUTH EVERY 7 DAYS. 4 capsule 4    ferrous sulfate 325 (65 FE) MG EC tablet Take 325 mg by mouth once daily. "       gemfibrozil (LOPID) 600 MG tablet Patient takes one tablet every other day 60 tablet 3    [START ON 2/8/2018] hydrocodone-acetaminophen 10-325mg (NORCO)  mg Tab Take 1 tablet by mouth every 6 (six) hours as needed. 120 tablet 0    insulin lispro (HUMALOG) 100 unit/mL injection Inject under the skin 7 units in the AM, 10 units for lunch, 12 units for Dinner , before meals 10 mL 11    lancets (ONE TOUCH DELICA LANCETS) Misc Ultra 2 lancets to check blood sugar  each 6    LANTUS SOLOSTAR 100 unit/mL (3 mL) InPn pen INJECT 19 UNITS INTO THE SKIN EVERY EVENING. 15 Syringe 3    levothyroxine (SYNTHROID) 50 MCG tablet Take 1 tablet (50 mcg total) by mouth once daily. 30 tablet 11    lidocaine (LIDODERM) 5 % APPLY 1 PATCH TO SKIN 12 HOURS ON 12 HOURS OFF 30 patch 6    magnesium oxide (MAG-OX) 400 mg tablet TAKE 1 TABLET (400 MG TOTAL) BY MOUTH 2 (TWO) TIMES DAILY. 180 tablet 3    methocarbamol (ROBAXIN) 750 MG Tab TAKE 1-2 TABLETS BY MOUTH 4 TIMES DAILY AS NEEDED 180 tablet 6    multivitamin with minerals tablet Take 1 tablet by mouth once daily.      oxybutynin (DITROPAN-XL) 10 MG 24 hr tablet Take 10 mg by mouth once daily.  9    pravastatin (PRAVACHOL) 40 MG tablet TAKE 1 TABLET BY MOUTH EVERY DAY 30 tablet 5    riboflavin, vitamin B2, 400 mg Tab Take 400 mg by mouth once daily. 90 tablet 3    scopolamine (TRANSDERM-SCOP) 1.3-1.5 mg (1 mg over 3 days) Place 1 patch onto the skin every 72 hours. 4 patch 0    sodium bicarbonate 650 MG tablet TAKE 1 TABLET (650 MG TOTAL) BY MOUTH 3 (THREE) TIMES DAILY. 270 tablet 3    sumatriptan (IMITREX) 100 MG tablet Take 1 tablet (100 mg total) by mouth 2 (two) times daily as needed for Migraine. 9 tablet 11    topiramate (TOPAMAX) 200 MG Tab Take 1 tablet (200 mg total) by mouth 2 (two) times daily. 180 tablet 3    venlafaxine (EFFEXOR-XR) 75 MG 24 hr capsule Take 2 capsules (150 mg total) by mouth once daily. 180 capsule 1    diclofenac sodium 1 %  "Gel Apply 2 g topically daily as needed. 1 Tube 5     Current Facility-Administered Medications   Medication Dose Route Frequency Provider Last Rate Last Dose    onabotulinumtoxina injection 200 Units  200 Units Intramuscular Q90 Days PRUDENCE Mcfarland           Family History   Problem Relation Age of Onset    Diabetes Sister     Kidney disease Sister      CKD III    ALS Mother      d.    Cancer Maternal Grandmother      d. colon    Cancer Paternal Grandfather      d. lung    Scoliosis Brother      increased pain    Prostate cancer Brother     Diabetes Maternal Aunt     Kidney disease Maternal Aunt     Diabetes Maternal Uncle     Amblyopia Neg Hx     Blindness Neg Hx     Cataracts Neg Hx     Glaucoma Neg Hx     Macular degeneration Neg Hx     Retinal detachment Neg Hx     Strabismus Neg Hx        Social History     Social History    Marital status:      Spouse name: N/A    Number of children: N/A    Years of education: N/A     Occupational History    Not on file.     Social History Main Topics    Smoking status: Never Smoker    Smokeless tobacco: Never Used    Alcohol use No    Drug use: No    Sexual activity: Not Currently     Birth control/ protection: Abstinence     Other Topics Concern    Not on file     Social History Narrative    Single        Lives w/ sister  And brother     both are helping her during her post hosp recovery period       Objective:     Vitals:    01/26/18 1030   Resp: 20   SpO2: 97%   Weight: (!) 140 kg (308 lb 10.3 oz)   Height: 5' 8" (1.727 m)   PainSc:   7   PainLoc: Back       GEN:  Well developed, well nourished.  No acute distress.   HEENT:  No trauma.  Mucous membranes moist.  Nares patent bilaterally.  PSYCH: Normal affect. Thought content appropriate.  CHEST:  Breathing symmetric.  No audible wheezing.  ABD: Soft, non-distended.  SKIN:  Warm, pink, dry.  No rash on exposed areas.    EXT:  No cyanosis, clubbing, or edema.  No color change or " changes in nail or hair growth.  NEURO/MUSCULOSKELETAL:  Fully alert, oriented, and appropriate. Speech normal sen. No cranial nerve deficits.   Gait: Antalgic, cannot ambulate without assistance.  Present trendelenburg sign bilaterally.   Motor Strength: 5/5 motor strength throughout lower extremities.   Sensory: No sensory deficit in the lower extremities.   L-Spine:  Markedly decreased ROM with pain on extension, lateral rotation. Positive facet loading bilaterally.    Diffuse TTP over lumbar paraspinals > bilateral SI joints        Imaging:        The imaging studies listed below were independently reviewed by me, and I agree with the findings as documented below.     Narrative     Two views of the lumbar spine submitted.      Moderate degenerative change seen in the lumbar spine with multilevel interspace narrowing and vacuum disks.  Vertebral body heights are fairly well maintained.  Mild levoscoliosis seen.  Lateral alignment is reasonably good.   Impression      As above.      Electronically signed by: Real Rasmussen MD  Date: 12/05/14  Time: 10:47         Assessment:     Encounter Diagnoses   Name Primary?    Facet arthritis of lumbar region Yes    Lumbar spondylosis     Morbid obesity with BMI of 45.0-49.9, adult     Thoracic spine pain     Myalgia     Physical deconditioning        Plan:     Cecile was seen today for back pain.    Diagnoses and all orders for this visit:    Facet arthritis of lumbar region  -     X-Ray Lumbar Spine Complete 5 View; Future    Lumbar spondylosis  -     X-Ray Lumbar Spine Complete 5 View; Future    Morbid obesity with BMI of 45.0-49.9, adult  -     X-Ray Thoracic Spine AP Lateral; Future    Thoracic spine pain  -     X-Ray Thoracic Spine AP Lateral; Future    Myalgia    Physical deconditioning         Her pain is consistent with the above. At this time, she does not meet admission criteria to [facility] because:  · No history of formal pain management  · Inability to walk  for 5 minutes     Recommendation: Referral to Pain Management.  Appointment scheduled for 2/8/2018.  X-rays obtained for work up prior to the appointment.  She will likely need extensive PT in the future.  If she is able to build up her endurance and stamina, she would absolutely be a candidate for FRP in the future if needed.    The above plan and management options were discussed at length with patient. Patient is in agreement with the above and verbalized understanding. It will be communicated with the referring physician via electronic record, fax, or mail.

## 2018-01-31 ENCOUNTER — HOSPITAL ENCOUNTER (OUTPATIENT)
Dept: RADIOLOGY | Facility: HOSPITAL | Age: 68
Discharge: HOME | End: 2018-01-31
Attending: ANESTHESIOLOGY
Payer: MEDICARE

## 2018-01-31 DIAGNOSIS — M54.6 THORACIC SPINE PAIN: ICD-10-CM

## 2018-01-31 DIAGNOSIS — E66.01 MORBID OBESITY WITH BMI OF 45.0-49.9, ADULT: ICD-10-CM

## 2018-01-31 DIAGNOSIS — M47.816 FACET ARTHRITIS OF LUMBAR REGION: ICD-10-CM

## 2018-01-31 DIAGNOSIS — M47.816 LUMBAR SPONDYLOSIS: ICD-10-CM

## 2018-01-31 PROCEDURE — 72110 X-RAY EXAM L-2 SPINE 4/>VWS: CPT | Mod: 26,,, | Performed by: RADIOLOGY

## 2018-01-31 PROCEDURE — 72110 X-RAY EXAM L-2 SPINE 4/>VWS: CPT | Mod: TC,FY

## 2018-01-31 PROCEDURE — 72070 X-RAY EXAM THORAC SPINE 2VWS: CPT | Mod: 26,,, | Performed by: RADIOLOGY

## 2018-01-31 PROCEDURE — 72070 X-RAY EXAM THORAC SPINE 2VWS: CPT | Mod: TC,FY

## 2018-02-06 ENCOUNTER — LAB VISIT (OUTPATIENT)
Dept: LAB | Facility: HOSPITAL | Age: 68
End: 2018-02-06
Attending: INTERNAL MEDICINE
Payer: MEDICARE

## 2018-02-06 ENCOUNTER — OFFICE VISIT (OUTPATIENT)
Dept: UROLOGY | Facility: CLINIC | Age: 68
End: 2018-02-06
Payer: MEDICARE

## 2018-02-06 VITALS
HEART RATE: 99 BPM | WEIGHT: 293 LBS | BODY MASS INDEX: 44.41 KG/M2 | HEIGHT: 68 IN | SYSTOLIC BLOOD PRESSURE: 142 MMHG | DIASTOLIC BLOOD PRESSURE: 80 MMHG

## 2018-02-06 DIAGNOSIS — R33.9 URINARY RETENTION: ICD-10-CM

## 2018-02-06 DIAGNOSIS — Z93.59 CHRONIC SUPRAPUBIC CATHETER: ICD-10-CM

## 2018-02-06 DIAGNOSIS — Z43.5 ENCOUNTER FOR CARE OR REPLACEMENT OF SUPRAPUBIC TUBE: ICD-10-CM

## 2018-02-06 DIAGNOSIS — L92.9 GRANULATION TISSUE: ICD-10-CM

## 2018-02-06 DIAGNOSIS — N18.4 CKD (CHRONIC KIDNEY DISEASE), STAGE IV: ICD-10-CM

## 2018-02-06 DIAGNOSIS — N31.9 NEUROGENIC BLADDER: Primary | ICD-10-CM

## 2018-02-06 DIAGNOSIS — Z43.5 ENCOUNTER FOR SUPRAPUBIC CATHETER CARE: ICD-10-CM

## 2018-02-06 LAB
CREAT UR-MCNC: 49 MG/DL
PROT UR-MCNC: 20 MG/DL
PROT/CREAT RATIO, UR: 0.41

## 2018-02-06 PROCEDURE — 17250 CHEM CAUT OF GRANLTJ TISSUE: CPT | Mod: PBBFAC | Performed by: NURSE PRACTITIONER

## 2018-02-06 PROCEDURE — 82570 ASSAY OF URINE CREATININE: CPT

## 2018-02-06 PROCEDURE — 99214 OFFICE O/P EST MOD 30 MIN: CPT | Mod: PBBFAC,25 | Performed by: NURSE PRACTITIONER

## 2018-02-06 PROCEDURE — 51705 CHANGE OF BLADDER TUBE: CPT | Mod: S$PBB,,, | Performed by: NURSE PRACTITIONER

## 2018-02-06 PROCEDURE — 51705 CHANGE OF BLADDER TUBE: CPT | Mod: PBBFAC | Performed by: NURSE PRACTITIONER

## 2018-02-06 PROCEDURE — 99214 OFFICE O/P EST MOD 30 MIN: CPT | Mod: S$PBB,25,, | Performed by: NURSE PRACTITIONER

## 2018-02-06 PROCEDURE — 17250 CHEM CAUT OF GRANLTJ TISSUE: CPT | Mod: S$PBB,51,, | Performed by: NURSE PRACTITIONER

## 2018-02-06 PROCEDURE — 99999 PR PBB SHADOW E&M-EST. PATIENT-LVL IV: CPT | Mod: PBBFAC,,, | Performed by: NURSE PRACTITIONER

## 2018-02-06 NOTE — PROGRESS NOTES
Subjective:       Patient ID: Cecile Bowen is a 67 y.o. female.    Chief Complaint: Urinary Retention (Neurogenic Bladder)    Cecile Bowen is a 67 y.o. Female with history of bilateral hydronephrosis, incomplete bladder emptying which is managed by SPT (16fr).  Her last SPT change was 01/04/2018.     Here today for scheduled SPT change.   She voices no urological complaints.   SPT is draining well.  No fever, n/v        Last visit with Dr. Whittington was 11/10/2015.    12/10/2015:  Procedure(s) Performed:   1. Cystoscopy with bladder botox injection  2. Silver nitrate to suprapubic catheter site  3 . Change suprapubic catheter tube            Past Medical History:    Diabetes type 2, uncontrolled                   12/12/2012    Obesity                                         12/12/2012    Hypertension                                    12/12/2012    DJD (degenerative joint disease)                12/12/2012    Hypothyroidism                                  12/12/2012    Nuclear sclerosis - Both Eyes                   3/24/2014     Migraine                                                      Past Surgical History:    TOTAL ABDOMINAL HYSTERECTOMY W/ BILATERAL SALP*  1985          GALLBLADDER SURGERY                              2006          TONSILLECTOMY, ADENOIDECTOMY                                   OVARIAN CYST SURGERY                             1985          HYSTERECTOMY                                                   DILATION AND CURETTAGE OF UTERUS                               Review of patient's family history indicates:    Diabetes                       Sister                    Kidney disease                 Sister                    ALS                            Mother                      Comment: d.    Cancer                         Maternal Grandmother        Comment: d. colon    Cancer                         Paternal Grandfather        Comment: d. lung    Diabetes                        "Maternal Aunt             Kidney disease                 Maternal Aunt             Diabetes                       Maternal Uncle            Amblyopia                      Neg Hx                    Blindness                      Neg Hx                    Cataracts                      Neg Hx                    Glaucoma                       Neg Hx                    Macular degeneration           Neg Hx                    Retinal detachment             Neg Hx                    Strabismus                     Neg Hx                      Social History    Marital Status:             Spouse Name:                       Years of Education:                 Number of children:               Occupational History    None on file    Social History Main Topics    Smoking Status: Never Smoker                      Smokeless Status: Never Used                        Alcohol Use: No              Drug Use: No              Sexual Activity: Not Currently           Birth Control/Protection: Abstinence    Other Topics            Concern    None on file    Social History Narrative    Single      Lives w/ sister  And brother     both are helping her during her post hosp recovery period        Allergies:  Review of patient's allergies indicates no known allergies.    Medications:  Current outpatient prescriptions:amitriptyline (ELAVIL) 10 MG tablet, TAKE 3 TABLETS BY MOUTH IN THE EVENING, Disp: 90 tablet, Rfl: 5;  aspirin (ECOTRIN) 81 MG EC tablet, Take 81 mg by mouth once daily., Disp: , Rfl: ;  BD INSULIN PEN NEEDLE UF SHORT 31 X 5/16 " Ndle, USE ONCE DAILY WITH LANTUS SOLOSTAR PEN INSULIN, Disp: 100 each, Rfl: 11  butalbital-acetaminophen-caffeine -40 mg (FIORICET, ESGIC) -40 mg per tablet, TAKE 1 TABLET EVERY 4 TO 6 HOURS, Disp: 30 tablet, Rfl: 0;  cyanocobalamin, vitamin B-12, (VITAMIN B-12) 2,500 mcg Subl, Place 2,500 mcg under the tongue once daily., Disp: , Rfl: ;  diphenhydrAMINE (BENADRYL) 25 mg capsule, Take " "25 mg by mouth every 6 (six) hours as needed., Disp: , Rfl:   ferrous sulfate 325 (65 FE) MG EC tablet, Take 325 mg by mouth 3 (three) times daily with meals., Disp: , Rfl: ;  fluticasone (FLONASE) 50 mcg/actuation nasal spray, 1 SPRAY IN EACH NOSTRIL DAILY (Patient taking differently: 1 SPRAY IN EACH NOSTRIL DAILY PRN), Disp: 16 g, Rfl: 1;  furosemide (LASIX) 20 MG tablet, Take 1 tablet (20 mg total) by mouth once daily., Disp: 4 tablet, Rfl: 0  gemfibrozil (LOPID) 600 MG tablet, TAKE 1 TABLET BY MOUTH TWICE A DAY, Disp: 60 tablet, Rfl: 5;  (START ON 4/29/2015) hydrocodone-acetaminophen 10-325mg (NORCO)  mg Tab, Take 1 tablet by mouth every 6 (six) hours as needed., Disp: 120 tablet, Rfl: 0;  insulin lispro (HUMALOG) 100 unit/mL injection, Inject 7 Units into the skin 3 (three) times daily before meals., Disp: 6.3 mL, Rfl: 11  insulin syringe-needle U-100 (BD INSULIN SYRINGE ULTRA-FINE) 1/2 mL 31 x 15/64" Syrg, 1 Box by Misc.(Non-Drug; Combo Route) route 4 (four) times daily., Disp: 100 Syringe, Rfl: 12;  lancets (ONE TOUCH DELICA LANCETS) Misc, Ultra 2 lancets to check blood sugar BID, Disp: 100 each, Rfl: 6;  LANTUS SOLOSTAR 100 unit/mL (3 mL) InPn pen, , Disp: , Rfl: 3  LIDODERM 5 %(700 mg/patch), APPLY 1 PATCH TO SKIN DAILY (LEAVING ON FOR 12 HOURS AND OFF FOR 12 HOURS), Disp: 30 patch, Rfl: 6;  magnesium oxide (MAG-OX) 400 mg tablet, TAKE 1 TABLET (400 MG TOTAL) BY MOUTH 2 (TWO) TIMES DAILY., Disp: 60 tablet, Rfl: 11;  methocarbamol (ROBAXIN) 750 MG Tab, TAKE 1 TO 2 TABLETS BY MOUTH 3 TIMES A DAY (Patient taking differently: Take 2 tablets twice daily), Disp: 120 tablet, Rfl: 3  methocarbamol (ROBAXIN) 750 MG Tab, TAKE 1 TO 2 TABLETS BY MOUTH 3 TIMES A DAY, Disp: 120 tablet, Rfl: 3;  methylPREDNISolone (MEDROL DOSEPACK) 4 mg tablet, use as directed, Disp: 21 tablet, Rfl: 0;  multivitamin with minerals tablet, Take 1 tablet by mouth once daily., Disp: , Rfl: ;  pravastatin (PRAVACHOL) 40 MG tablet, TAKE 1 " TABLET BY MOUTH EVERY DAY, Disp: 30 tablet, Rfl: 2  pyridoxine (B-6) 100 MG Tab, Take 1 tablet (100 mg total) by mouth once daily., Disp: 30 tablet, Rfl: 12;  scopolamine (TRANSDERM-SCOP) 1.5 mg, Place 1 patch (1.5 mg total) onto the skin every 72 hours., Disp: 4 patch, Rfl: 0;  sulfamethoxazole-trimethoprim 800-160mg (BACTRIM DS) 800-160 mg Tab, Take 1 tablet by mouth 2 (two) times daily., Disp: , Rfl: 0  sumatriptan (IMITREX) 100 MG tablet, TAKE 1 TABLET (100 MG TOTAL) BY MOUTH ONCE., Disp: 9 tablet, Rfl: 6;  SYNTHROID 125 mcg tablet, TAKE 1 TABLET BY MOUTH DAILY, Disp: 90 tablet, Rfl: 4;  topiramate (TOPAMAX) 100 MG tablet, TAKE 1 TABLET (100 MG TOTAL) BY MOUTH 2 (TWO) TIMES DAILY., Disp: 60 tablet, Rfl: 11;  topiramate (TOPAMAX) 25 MG tablet, Take 4 tablets (100 mg total) by mouth 2 (two) times daily., Disp: 240 tablet, Rfl: 5  venlafaxine (EFFEXOR-XR) 150 MG Cp24, TAKE 1 CAPSULE BY MOUTH ONCE DAILY, Disp: 30 capsule, Rfl: 2;  vitamin D (VITAMIN D3) 185 MG Tab, Take 185 mg by mouth once daily., Disp: , Rfl:           Review of Systems   Constitutional: Negative.  Negative for chills and fever.   HENT: Negative for facial swelling and trouble swallowing.    Eyes: Negative for visual disturbance.   Respiratory: Negative for cough, chest tightness, shortness of breath and wheezing.    Cardiovascular: Negative for chest pain and palpitations.   Gastrointestinal: Negative for abdominal pain, constipation, nausea and vomiting.   Genitourinary: Positive for difficulty urinating. Negative for dysuria, hematuria, urgency and vaginal bleeding.        SPT draining well     Musculoskeletal: Positive for arthralgias and gait problem.   Skin: Negative for rash.   Neurological: Negative for dizziness and headaches.   Hematological: Does not bruise/bleed easily.   Psychiatric/Behavioral: Negative for behavioral problems.       Objective:      Physical Exam   Nursing note and vitals reviewed.  Constitutional: She is oriented to  person, place, and time. She appears well-developed and well-nourished. She is active and cooperative.   HENT:   Head: Normocephalic.   Right Ear: External ear normal.   Left Ear: External ear normal.   Nose: Nose normal.   Eyes: Conjunctivae and lids are normal. Right eye exhibits no discharge. Left eye exhibits no discharge. No scleral icterus.   Neck: Trachea normal and normal range of motion. Neck supple. No tracheal deviation present.   Cardiovascular: Normal rate, regular rhythm and intact distal pulses.    Pulmonary/Chest: Effort normal. No respiratory distress.   Abdominal: Soft. Bowel sounds are normal. She exhibits no distension and no mass. There is no tenderness. There is no rebound and no guarding.       Musculoskeletal: Normal range of motion. She exhibits no edema.   Neurological: She is alert and oriented to person, place, and time.   Skin: Skin is warm, dry and intact.     Psychiatric: She has a normal mood and affect. Her behavior is normal. Judgment and thought content normal.       Assessment:       1. Neurogenic bladder    2. Urinary retention    3. Chronic suprapubic catheter    4. Encounter for suprapubic catheter care    5. Encounter for care or replacement of suprapubic tube    6. Granulation tissue        Plan:         I spent 30 minutes with the patient of which more than half was spent in direct consultation with the patient in regards to our treatment and plan.    Education and recommendations of today's plan of care including home remedies.  We discussed daily care; keeping surrounding tissue clean and dry.  I removed old SPT; silver nitrate sticks x 3 to granulating tissue.  I placed a new 16fr SPT easily using sterile technique.   Irrigated to verify the position.  Balloon inflated with 9cc sterile water; plug placed.  Skin barrier cream and dsg applied to site.   RTC one month

## 2018-02-08 ENCOUNTER — OFFICE VISIT (OUTPATIENT)
Dept: PAIN MEDICINE | Facility: CLINIC | Age: 68
End: 2018-02-08
Attending: ANESTHESIOLOGY
Payer: MEDICARE

## 2018-02-08 VITALS
TEMPERATURE: 99 F | WEIGHT: 293 LBS | BODY MASS INDEX: 44.41 KG/M2 | HEIGHT: 68 IN | SYSTOLIC BLOOD PRESSURE: 125 MMHG | HEART RATE: 97 BPM | DIASTOLIC BLOOD PRESSURE: 73 MMHG

## 2018-02-08 DIAGNOSIS — M47.816 LUMBAR SPONDYLOSIS: Primary | ICD-10-CM

## 2018-02-08 DIAGNOSIS — M47.816 FACET ARTHRITIS OF LUMBAR REGION: ICD-10-CM

## 2018-02-08 DIAGNOSIS — E66.01 MORBID OBESITY WITH BMI OF 45.0-49.9, ADULT: ICD-10-CM

## 2018-02-08 DIAGNOSIS — M54.6 THORACIC SPINE PAIN: ICD-10-CM

## 2018-02-08 DIAGNOSIS — R53.81 PHYSICAL DECONDITIONING: ICD-10-CM

## 2018-02-08 PROCEDURE — 99213 OFFICE O/P EST LOW 20 MIN: CPT | Mod: PBBFAC | Performed by: ANESTHESIOLOGY

## 2018-02-08 PROCEDURE — 99213 OFFICE O/P EST LOW 20 MIN: CPT | Mod: S$PBB,,, | Performed by: ANESTHESIOLOGY

## 2018-02-08 PROCEDURE — 99999 PR PBB SHADOW E&M-EST. PATIENT-LVL III: CPT | Mod: PBBFAC,,, | Performed by: ANESTHESIOLOGY

## 2018-02-08 NOTE — PROGRESS NOTES
Subjective:     Patient ID: eCcile Bowen is a 67 y.o. female.    Chief Complaint: Pain    Consulted by: Dr. Enrique Henao     Disclaimer: This note was generated using voice recognition software.  There may be a typographical errors that were missed during proofreading.      HPI:    Cecile Bowen is a 67 y.o. female who presents today with low back pain. Her back pain has been present for 20+ years.  She has been diagnosed with degenerative disc disease (20 years ago), levoscoliosis (5 years ago), OA of the spine.  Her pain has been worsening over time, worse in the last decade, and severe for the past few years.  The worst of her pain is in her low back. The pain is equal bilaterally and does not radiate.  No associated numbness, tingling, or muscle weakness.  She has noticed that she has lost some muscle tone.  This pain is described in detail below.    She was medically screened by the [facility] and found to not be a current candidate.      She has recently had x-rays of her thoracic and lumbar spine that we will review today.    Physical Therapy: Yes, at [place], most recent was 2 visits in 2014.  She did a course for a couple of months before that    Non-pharmacologic Treatment:     · Ice/Heat: Heat helps  · TENS: Not tried  · Massage: Not tried  · Chiropractic care: Not tried  · Acupuncture: Not tried  · Other: Use a scooter. Has a recliner lift. No other assistive devices         Pain Medications:         · Currently taking: Norco 10/325 mg BID-QID PRN, Topamax, Robaxin 1500 mg TID, Excedrin migraine, Imitrex, topamax, Effexor, Biofreeze    · Has tried in the past:  Flexeril, Baclofen, gabapentin, memantine, amitriptyline, Zoloft, Lidoderm patches    · Has not tried: NSAIDs, Tylenol, Lyrica, Rx topical creams    Blood thinners: None    Interventional Therapies: None    Relevant Surgeries: None    Affecting sleep? Yes, she is getting about 2-3 hours at a time    Affecting daily activities? Yes    Depressive  symptoms? Treated for depression          · SI/HI? No    Work status: Retired , 3 rd grade.  Retired due to pain    Prescription Monitoring Program database:  Reviewed and consistent with medication use as prescribed.    Pain Scales  Best: 4/10  Worst: 9/10  Usually: 7/10  Today: 8/10    Back Pain   This is a chronic problem. The current episode started more than 1 year ago. The problem occurs constantly. The problem has been rapidly worsening since onset. The pain is present in the lumbar spine, sacro-iliac and thoracic spine. The pain does not radiate (having seperate pains in Bilateral Hip and Knees). The pain is at a severity of 7/10. The pain is moderate. The pain is the same all the time. The symptoms are aggravated by standing, bending and coughing (walking, lifting and getting up from a sitting position). Associated symptoms include headaches. Pertinent negatives include no chest pain, fever or weight loss. She has tried NSAIDs, muscle relaxant, injection treatment and heat (injection treatments in knees) for the symptoms. Physical therapy was effective and ineffective.      Last 3 PDI Scores 2/8/2018 1/26/2018   Pain Disability Index (PDI) 47 45       Review of Systems   Constitution: Negative for chills, fever, malaise/fatigue, weight gain and weight loss.   HENT: Negative for ear pain and hoarse voice.    Eyes: Negative for blurred vision, pain and visual disturbance.   Cardiovascular: Negative for chest pain, dyspnea on exertion and irregular heartbeat.   Respiratory: Negative for cough, shortness of breath and wheezing.    Endocrine: Negative for cold intolerance and heat intolerance.   Hematologic/Lymphatic: Negative for adenopathy and bleeding problem. Does not bruise/bleed easily.   Skin: Negative for color change, itching and rash.   Musculoskeletal: Positive for back pain, joint pain, muscle cramps and stiffness.   Gastrointestinal: Negative for change in bowel habit, diarrhea,  "hematemesis, hematochezia, melena and vomiting.   Genitourinary: Negative for flank pain, frequency, hematuria and urgency.   Neurological: Positive for headaches and loss of balance. Negative for difficulty with concentration, dizziness and seizures.   Psychiatric/Behavioral: Negative for altered mental status, depression and suicidal ideas. The patient is not nervous/anxious.    Allergic/Immunologic: Negative for HIV exposure.             Past Medical History:   Diagnosis Date    Back pain     CKD (chronic kidney disease) stage 3, GFR 30-59 ml/min     as per patient    Diabetes type 2, uncontrolled 12/12/2012    DJD (degenerative joint disease) 12/12/2012    Hypertension 12/12/2012    Hypothyroidism 12/12/2012    Migraine     Nuclear sclerosis - Both Eyes 3/24/2014    Obesity 12/12/2012       Past Surgical History:   Procedure Laterality Date    BREAST BIOPSY Right     benign    CHOLECYSTECTOMY      COLONOSCOPY N/A 1/13/2017    Procedure: COLONOSCOPY;  Surgeon: Morris Wiseman MD;  Location: [facility] ([address] [place] [address]);  Service: Endoscopy;  Laterality: N/A;  Renal pt Nephrology advised to avoid phosphate preps    DILATION AND CURETTAGE OF UTERUS      GALLBLADDER SURGERY  2006    OVARIAN CYST SURGERY  1985    spt placement      TONSILLECTOMY, ADENOIDECTOMY      TOTAL ABDOMINAL HYSTERECTOMY W/ BILATERAL SALPINGOOPHORECTOMY  1985       Review of patient's allergies indicates:  No Known Allergies    Current Outpatient Prescriptions   Medication Sig Dispense Refill    aspirin-acetaminophen-caffeine 250-250-65 mg (EXCEDRIN MIGRAINE) 250-250-65 mg per tablet Take 1 tablet by mouth every 6 (six) hours as needed for Pain.      BD INSULIN PEN NEEDLE UF SHORT 31 gauge x 5/16" Ndle USE ONCE DAILY WITH LANTUS SOLOSTAR PEN INSULIN 150 each 3    BD INSULIN SYRINGE ULTRA-FINE 1/2 mL 31 gauge x 15/64" Syrg USE WITH INSULIN 4 TIMES A  Syringe 4    cyanocobalamin, vitamin B-12, (VITAMIN B-12) 5,000 mcg Subl " Place 1 tablet under the tongue once daily.      ergocalciferol (VITAMIN D2) 50,000 unit Cap TAKE 1 CAPSULE (50,000 UNITS TOTAL) BY MOUTH EVERY 7 DAYS. 4 capsule 4    ferrous sulfate 325 (65 FE) MG EC tablet Take 325 mg by mouth once daily.       gemfibrozil (LOPID) 600 MG tablet Patient takes one tablet every other day 60 tablet 3    hydrocodone-acetaminophen 10-325mg (NORCO)  mg Tab Take 1 tablet by mouth every 6 (six) hours as needed. 120 tablet 0    insulin lispro (HUMALOG) 100 unit/mL injection Inject under the skin 7 units in the AM, 10 units for lunch, 12 units for Dinner , before meals 10 mL 11    lancets (ONE TOUCH DELICA LANCETS) Misc Ultra 2 lancets to check blood sugar  each 6    LANTUS SOLOSTAR 100 unit/mL (3 mL) InPn pen INJECT 19 UNITS INTO THE SKIN EVERY EVENING. 15 Syringe 3    levothyroxine (SYNTHROID) 50 MCG tablet Take 1 tablet (50 mcg total) by mouth once daily. 30 tablet 11    lidocaine (LIDODERM) 5 % APPLY 1 PATCH TO SKIN 12 HOURS ON 12 HOURS OFF 30 patch 6    magnesium oxide (MAG-OX) 400 mg tablet TAKE 1 TABLET (400 MG TOTAL) BY MOUTH 2 (TWO) TIMES DAILY. 180 tablet 3    methocarbamol (ROBAXIN) 750 MG Tab TAKE 1-2 TABLETS BY MOUTH 4 TIMES DAILY AS NEEDED 180 tablet 6    multivitamin with minerals tablet Take 1 tablet by mouth once daily.      oxybutynin (DITROPAN-XL) 10 MG 24 hr tablet Take 10 mg by mouth once daily.  9    pravastatin (PRAVACHOL) 40 MG tablet TAKE 1 TABLET BY MOUTH EVERY DAY 30 tablet 5    riboflavin, vitamin B2, 400 mg Tab Take 400 mg by mouth once daily. 90 tablet 3    scopolamine (TRANSDERM-SCOP) 1.3-1.5 mg (1 mg over 3 days) Place 1 patch onto the skin every 72 hours. 4 patch 0    sodium bicarbonate 650 MG tablet TAKE 1 TABLET (650 MG TOTAL) BY MOUTH 3 (THREE) TIMES DAILY. 270 tablet 3    sumatriptan (IMITREX) 100 MG tablet Take 1 tablet (100 mg total) by mouth 2 (two) times daily as needed for Migraine. 9 tablet 11    topiramate (TOPAMAX)  "200 MG Tab Take 1 tablet (200 mg total) by mouth 2 (two) times daily. 180 tablet 3    venlafaxine (EFFEXOR-XR) 75 MG 24 hr capsule Take 2 capsules (150 mg total) by mouth once daily. 180 capsule 1    diclofenac sodium 1 % Gel Apply 2 g topically daily as needed. 1 Tube 5     Current Facility-Administered Medications   Medication Dose Route Frequency Provider Last Rate Last Dose    onabotulinumtoxina injection 200 Units  200 Units Intramuscular Q90 Days PRUDENCE Mcfarland           Family History   Problem Relation Age of Onset    Diabetes Sister     Kidney disease Sister      CKD III    ALS Mother      d.    Cancer Maternal Grandmother      d. colon    Cancer Paternal Grandfather      d. lung    Scoliosis Brother      increased pain    Prostate cancer Brother     Diabetes Maternal Aunt     Kidney disease Maternal Aunt     Diabetes Maternal Uncle     Amblyopia Neg Hx     Blindness Neg Hx     Cataracts Neg Hx     Glaucoma Neg Hx     Macular degeneration Neg Hx     Retinal detachment Neg Hx     Strabismus Neg Hx        Social History     Social History    Marital status:      Spouse name: N/A    Number of children: N/A    Years of education: N/A     Occupational History    Not on file.     Social History Main Topics    Smoking status: Never Smoker    Smokeless tobacco: Never Used    Alcohol use No    Drug use: No    Sexual activity: Not Currently     Birth control/ protection: Abstinence     Other Topics Concern    Not on file     Social History Narrative    Single        Lives w/ sister  And brother     both are helping her during her post hosp recovery period       Objective:     Vitals:    02/08/18 1310   BP: 125/73   Pulse: 97   Temp: 98.6 °F (37 °C)   Weight: (!) 139.7 kg (308 lb)   Height: 5' 8" (1.727 m)   PainSc:   8       GEN:  Well developed, well nourished.  No acute distress.   HEENT:  No trauma.  Mucous membranes moist.  Nares patent bilaterally.  PSYCH: Normal " affect. Thought content appropriate.  CHEST:  Breathing symmetric.  No audible wheezing.  ABD: Soft, non-distended.  SKIN:  Warm, pink, dry.  No rash on exposed areas.    EXT:  No cyanosis, clubbing, or edema.  No color change or changes in nail or hair growth.  NEURO/MUSCULOSKELETAL:  Fully alert, oriented, and appropriate. Speech normal sen. No cranial nerve deficits.   Gait: Antalgic, cannot ambulate without assistance.  Present trendelenburg sign bilaterally.   Motor Strength: 5/5 motor strength throughout lower extremities.   Sensory: No sensory deficit in the lower extremities.   L-Spine:  Markedly decreased ROM with pain on extension, lateral rotation. Positive facet loading bilaterally in upper lumbar spine.    Diffuse TTP over lumbar paraspinals > bilateral SI joints        Imaging:        The imaging studies listed below were independently reviewed by me, and I agree with the findings as documented below.     Narrative     Two views of the lumbar spine submitted.      Moderate degenerative change seen in the lumbar spine with multilevel interspace narrowing and vacuum disks.  Vertebral body heights are fairly well maintained.  Mild levoscoliosis seen.  Lateral alignment is reasonably good.   Impression      As above.      Electronically signed by: Real Rasmussen MD  Date: 12/05/14  Time: 10:47     Narrative     5 views: There is moderate DJD at L2-L3, mild elsewhere. Alignment is normal. No fracture dislocation bone destruction or pars defect seen.   Impression      DJD.      Electronically signed by: ANANT LICONA MD  Date: 01/31/18  Time: 12:30      Narrative     2 views of the thoracic spine were obtained, with AP and lateral projections submitted.  Comparison is made to a thoracic CT examination of 2/9/17.    No significant alignment abnormality.  Vertebral body heights are adequately maintained, without significant compression deformity at any level.  Note is made of disc narrowing and vacuum  phenomena indicative of disc desiccation at both T7-8 and T8-9, with minimal marginal vertebral endplate spurring at these levels, findings which can also be appreciated on the thoracic CT examination of 2/9/17.  Vertebral endplates are well-defined.  No osseous destructive process or paraspinal soft tissue mass.  Right upper quadrant surgical clips are incidentally observed.   Impression      Disc narrowing, minimal marginal vertebral endplate spurring, and vacuum phenomena indicative of disc desiccation are again seen at both the T7-8 and T8-9 levels.  No evidence of compression fracture or other detrimental change since the CT exam of 2/9/17 is appreciated.      Electronically signed by: Nithin Valadez MD  Date: 01/31/18  Time: 12:37          Assessment:     Encounter Diagnoses   Name Primary?    Lumbar spondylosis Yes    Facet arthritis of lumbar region     Thoracic spine pain     Physical deconditioning     Morbid obesity with BMI of 45.0-49.9, adult        Plan:     Diagnoses and all orders for this visit:    Lumbar spondylosis    Facet arthritis of lumbar region    Thoracic spine pain    Physical deconditioning    Morbid obesity with BMI of 45.0-49.9, adult       Her pain is consistent with the above.    We discussed the assessment and recommendations.  All available images were reviewed. We discussed the disease process, prognosis, treatment plan, and risks and benefits. The patient is aware of the risks and benefits of the medications being prescribed, common side effects, and proper usage. The following is the plan we agreed on:     1. Schedule for L1-3 LMBB  1. If positive, RFA  2. If negative, MRI  2. Left knee genicular nerve blocks  1. If positive, RFA  2. If negative, x-ray and consider Synvisc  3. She will likely need extensive PT in the future.  If she is able to build up her endurance and stamina, she would absolutely be a candidate for FRP in the future if needed.  4. RTC in 3-6 weeks or sooner if  needed.    Thank you for allowing me to participate in the care of this patient.   Please do not hesitate to call me at [phone] with any questions or concerns.    The above plan and management options were discussed at length with patient. Patient is in agreement with the above and verbalized understanding. It will be communicated with the referring physician via electronic record, fax, or mail.

## 2018-02-20 ENCOUNTER — HOSPITAL ENCOUNTER (OUTPATIENT)
Facility: OTHER | Age: 68
Discharge: HOME | End: 2018-02-20
Attending: ANESTHESIOLOGY | Admitting: ANESTHESIOLOGY
Payer: MEDICARE

## 2018-02-20 ENCOUNTER — SURGERY (OUTPATIENT)
Age: 68
End: 2018-02-20

## 2018-02-20 VITALS
WEIGHT: 293 LBS | OXYGEN SATURATION: 97 % | RESPIRATION RATE: 18 BRPM | BODY MASS INDEX: 44.41 KG/M2 | DIASTOLIC BLOOD PRESSURE: 82 MMHG | TEMPERATURE: 98 F | HEIGHT: 68 IN | HEART RATE: 96 BPM | SYSTOLIC BLOOD PRESSURE: 139 MMHG

## 2018-02-20 DIAGNOSIS — M25.562 CHRONIC PAIN OF LEFT KNEE: ICD-10-CM

## 2018-02-20 DIAGNOSIS — M47.816 LUMBAR SPONDYLOSIS: Primary | ICD-10-CM

## 2018-02-20 DIAGNOSIS — G89.29 CHRONIC PAIN OF LEFT KNEE: ICD-10-CM

## 2018-02-20 LAB
GLUCOSE SERPL-MCNC: 415 MG/DL (ref 70–110)
POCT GLUCOSE: 415 MG/DL (ref 70–110)
POCT GLUCOSE: 431 MG/DL (ref 70–110)

## 2018-02-20 PROCEDURE — 64450 NJX AA&/STRD OTHER PN/BRANCH: CPT | Mod: 59,LT,, | Performed by: ANESTHESIOLOGY

## 2018-02-20 PROCEDURE — 64494 INJ PARAVERT F JNT L/S 2 LEV: CPT | Mod: 50 | Performed by: ANESTHESIOLOGY

## 2018-02-20 PROCEDURE — 64493 INJ PARAVERT F JNT L/S 1 LEV: CPT | Mod: 50 | Performed by: ANESTHESIOLOGY

## 2018-02-20 PROCEDURE — 64493 INJ PARAVERT F JNT L/S 1 LEV: CPT | Mod: 50,,, | Performed by: ANESTHESIOLOGY

## 2018-02-20 PROCEDURE — 64494 INJ PARAVERT F JNT L/S 2 LEV: CPT | Mod: 50,,, | Performed by: ANESTHESIOLOGY

## 2018-02-20 PROCEDURE — 25000003 PHARM REV CODE 250: Performed by: ANESTHESIOLOGY

## 2018-02-20 PROCEDURE — 64450 NJX AA&/STRD OTHER PN/BRANCH: CPT | Performed by: ANESTHESIOLOGY

## 2018-02-20 PROCEDURE — 64495 INJ PARAVERT F JNT L/S 3 LEV: CPT | Mod: 50 | Performed by: ANESTHESIOLOGY

## 2018-02-20 PROCEDURE — S0020 INJECTION, BUPIVICAINE HYDRO: HCPCS | Performed by: ANESTHESIOLOGY

## 2018-02-20 PROCEDURE — 64495 INJ PARAVERT F JNT L/S 3 LEV: CPT | Mod: 50,,, | Performed by: ANESTHESIOLOGY

## 2018-02-20 PROCEDURE — 82947 ASSAY GLUCOSE BLOOD QUANT: CPT | Performed by: ANESTHESIOLOGY

## 2018-02-20 RX ORDER — BUPIVACAINE HYDROCHLORIDE 5 MG/ML
3 INJECTION, SOLUTION EPIDURAL; INTRACAUDAL ONCE
Status: DISCONTINUED | OUTPATIENT
Start: 2018-02-20 | End: 2018-02-20 | Stop reason: HOSPADM

## 2018-02-20 RX ORDER — ALPRAZOLAM 0.5 MG/1
0.5 TABLET, ORALLY DISINTEGRATING ORAL NIGHTLY PRN
Status: DISCONTINUED | OUTPATIENT
Start: 2018-02-20 | End: 2018-02-20 | Stop reason: HOSPADM

## 2018-02-20 RX ORDER — BUPIVACAINE HYDROCHLORIDE 5 MG/ML
INJECTION, SOLUTION EPIDURAL; INTRACAUDAL
Status: DISCONTINUED | OUTPATIENT
Start: 2018-02-20 | End: 2018-02-20 | Stop reason: HOSPADM

## 2018-02-20 RX ORDER — LIDOCAINE HYDROCHLORIDE 10 MG/ML
10 INJECTION INFILTRATION; PERINEURAL ONCE
Status: COMPLETED | OUTPATIENT
Start: 2018-02-20 | End: 2018-02-20

## 2018-02-20 RX ADMIN — ALPRAZOLAM 0.5 MG: 0.5 TABLET, ORALLY DISINTEGRATING ORAL at 01:02

## 2018-02-20 RX ADMIN — SODIUM BICARBONATE 4 MEQ: 0.2 INJECTION, SOLUTION INTRAVENOUS at 02:02

## 2018-02-20 RX ADMIN — BUPIVACAINE HYDROCHLORIDE 10 ML: 5 INJECTION, SOLUTION EPIDURAL; INTRACAUDAL; PERINEURAL at 02:02

## 2018-02-20 RX ADMIN — LIDOCAINE HYDROCHLORIDE 10 ML: 10 INJECTION, SOLUTION INFILTRATION; PERINEURAL at 02:02

## 2018-02-20 NOTE — DISCHARGE INSTRUCTIONS
Home Care Instructions Pain Management:    1. DIET:   You may resume your normal diet today.   2. BATHING:   You may shower with luke warm water.  3. DRESSING:   You may remove your bandage today.   4. ACTIVITY LEVEL:   You may resume your normal activities 24 hrs after your procedure.  5. MEDICATIONS:   You may resume your normal medications today.   6. SPECIAL INSTRUCTIONS:   No heat to the injection site for 24 hrs including, bath or shower, heating pad, moist heat, or hot tubs.    Use ice pack to injection site for any pain or discomfort.  Apply ice packs for 20 minute intervals as needed.   If you have received any sedatives by mouth today you may not drive for 12 hours.    If you have received any sedation through your IV, you may not drive for 24 hrs.     PLEASE CALL YOUR DOCTOR IF:  1. Redness or swelling around the injection site.  2. Fever of 101 degrees  3. Drainage (pus) from the injection site.  4. For any continuous bleeding (some dried blood over the incision is normal.)    FOR EMERGENCIES:   If any unusual problems or difficulties occur during clinic hours, call [phone] or 130.     Thank you for allowing us to care for you today. You may receive a survey about the care we provided. Your feedback is valuable and helps us provide excellent care throughout the community.

## 2018-02-20 NOTE — OP NOTE
Date of Procedure: 02/20/2018    Procedure: Bilateral L1-3 Lumbar medial branch blocks    Pre-op diagnosis: Lumbar spondylosis [M47.816]    Post-op diagnosis: Lumbar spondylosis [M47.816]     Physician: Dr. April Torres     Assistant: Dr. Chauhan    Anesthestia: local/niravam 0.5 mg    EBL: None    Specimens: None    All medications, allergies, and relevant histories were reviewed. No recent antibiotics or infections.  A time-out was taken to verify the correct patient, procedure, laterality, and appropriate medications/allergies.    Lumbar Medial Branch Block:   The procedure risks, benefits, and possible complications were discussed with the patient including nerve damage, spinal headache, bleeding, infection, and failure of pain relief.   Patient was placed in the prone position with the midriff elevated. Oblique view of the spine was obtained with fluoroscopy. Entry sites marked over the skin. The skin was prepped with chlorhexidine x3 and draped. Sterile precautions observed throughout the procedure. Xylocaine 1% was infiltrated locally over the entry site. A 22-gauge spinal needle was introduced at an angle, and the needle was placed onto the junction of the superior articular process and the most supermedial point on the transverse process. Placement was confirmed with a fluoroscopic view. After negative aspiration, 1cc of bupivacaine 0.5% was injected at each level. Needle was restyletted and removed. Procedure performed at the levels indicated: Right L1-3, Left:L1-3.     Patient tolerated the procedure well and there were no complications.   The patient was discharged home with a responsible adult.      Future Management:   If good results, can proceed to RFA.  If no relief, can follow up to discuss options.    Date of procedure: 02/20/2018    Procedure: Left genicular nerve blocks    Pre-op diagnosis: Left knee pain     Post-op diagnosis: Same     Physician: Dr. April Torres     Assistant:  None    Anesthestia: local     EBL: None    Specimens: None    All medications, allergies, and relevant histories were reviewed. No recent antibiotics or infections. A time-out was taken to verify the correct patient, procedure, laterality, and appropriate medications/allergies.     Fluoro-guided Geniculate Nerve Blocks:    Informed consent obtained for the injection. The left knee was prepped with chlor prep. Using US guidance, the lateral femoral condyle was identified. Under live US, a 27-gauge needle was advanced to the superior aspect of the lateral femoral condyle. After negative aspiration, 1 cc containing 0.5% bupivacaine was injected. US showed good spread of local anesthestic. This was the repeated at the level of the suprapatellar region, the medial femoral condyle and the medial tibial condyle. The needle was removed and a Band-Aid placed over the entry point. No complications.     Future Management:    If good results, can proceed with cooled RFA.   Follow up via phone to tell me the results.

## 2018-02-20 NOTE — PROGRESS NOTES
Blood sugar 415, repeat blood sugar 431. Dr. Torres notified. Dr. Torres oktravon to proceed with procedure. Patient instructed to closely monitor blood sugar at home.

## 2018-02-21 ENCOUNTER — TELEPHONE (OUTPATIENT)
Dept: PAIN MEDICINE | Facility: CLINIC | Age: 68
End: 2018-02-21

## 2018-02-21 ENCOUNTER — OFFICE VISIT (OUTPATIENT)
Dept: NEPHROLOGY | Facility: CLINIC | Age: 68
End: 2018-02-21
Payer: MEDICARE

## 2018-02-21 VITALS
WEIGHT: 293 LBS | BODY MASS INDEX: 47.14 KG/M2 | OXYGEN SATURATION: 96 % | DIASTOLIC BLOOD PRESSURE: 60 MMHG | HEART RATE: 107 BPM | SYSTOLIC BLOOD PRESSURE: 110 MMHG

## 2018-02-21 DIAGNOSIS — D50.9 IRON DEFICIENCY ANEMIA, UNSPECIFIED IRON DEFICIENCY ANEMIA TYPE: ICD-10-CM

## 2018-02-21 DIAGNOSIS — N18.4 CKD (CHRONIC KIDNEY DISEASE), STAGE IV: ICD-10-CM

## 2018-02-21 DIAGNOSIS — G47.33 OSA (OBSTRUCTIVE SLEEP APNEA): ICD-10-CM

## 2018-02-21 DIAGNOSIS — D64.3: ICD-10-CM

## 2018-02-21 DIAGNOSIS — N31.9 NEUROGENIC BLADDER: ICD-10-CM

## 2018-02-21 DIAGNOSIS — E78.5 HYPERLIPIDEMIA, UNSPECIFIED HYPERLIPIDEMIA TYPE: ICD-10-CM

## 2018-02-21 DIAGNOSIS — N25.81 SECONDARY HYPERPARATHYROIDISM, RENAL: ICD-10-CM

## 2018-02-21 DIAGNOSIS — Z93.59 CHRONIC SUPRAPUBIC CATHETER: ICD-10-CM

## 2018-02-21 DIAGNOSIS — E55.9 VITAMIN D DEFICIENCY DISEASE: ICD-10-CM

## 2018-02-21 DIAGNOSIS — E87.20 METABOLIC ACIDOSIS: ICD-10-CM

## 2018-02-21 DIAGNOSIS — N18.4 CKD (CHRONIC KIDNEY DISEASE) STAGE 4, GFR 15-29 ML/MIN: Primary | ICD-10-CM

## 2018-02-21 DIAGNOSIS — I10 ESSENTIAL HYPERTENSION: ICD-10-CM

## 2018-02-21 DIAGNOSIS — M15.9 PRIMARY OSTEOARTHRITIS INVOLVING MULTIPLE JOINTS: ICD-10-CM

## 2018-02-21 DIAGNOSIS — M85.80 OSTEOPENIA, UNSPECIFIED LOCATION: ICD-10-CM

## 2018-02-21 DIAGNOSIS — G43.711 INTRACTABLE CHRONIC MIGRAINE WITHOUT AURA AND WITH STATUS MIGRAINOSUS: ICD-10-CM

## 2018-02-21 DIAGNOSIS — E66.01 MORBID OBESITY DUE TO EXCESS CALORIES: ICD-10-CM

## 2018-02-21 PROCEDURE — 99999 PR PBB SHADOW E&M-EST. PATIENT-LVL IV: CPT | Mod: PBBFAC,,, | Performed by: INTERNAL MEDICINE

## 2018-02-21 PROCEDURE — 99214 OFFICE O/P EST MOD 30 MIN: CPT | Mod: S$PBB,,, | Performed by: INTERNAL MEDICINE

## 2018-02-21 PROCEDURE — 99214 OFFICE O/P EST MOD 30 MIN: CPT | Mod: PBBFAC,PO | Performed by: INTERNAL MEDICINE

## 2018-02-21 NOTE — TELEPHONE ENCOUNTER
----- Message from Delaney Perdomo sent at 2/21/2018  3:01 PM CST -----  Contact: pt  _  1st Request  _  2nd Request  _  3rd Request        Who: pt    Why: Requesting a call back in regards to pain scores left knee 2, 2, 3, 4, 5, 6, 7, 8  Back 4, 5, 4, 5, 5, 5, 7, 8    What Number to Call Back:[phone]    When to Expect a call back: (Within 24 hours)    Please return the call at earliest convenience. Thanks!

## 2018-02-21 NOTE — TELEPHONE ENCOUNTER
Contacted patient to get clarification on relief after her procedure.     She reports she had 40% of relief on her Knee block   And the LMMB gave 30% relief.     Please advise on next step:     1. Treatment plan: Schedule for L1-3 LMBB  1. If positive, RFA  2. If negative, MRI  2. Left knee genicular nerve blocks  1. If positive, RFA  2. If negative, x-ray and consider Synvisc  3. She will likely need extensive PT in the future.  If she is able to build up her endurance and stamina, she would absolutely be a candidate for FRP in the future if needed.  RTC in 3-6 weeks or sooner if needed.

## 2018-02-22 RX ORDER — SUMATRIPTAN SUCCINATE 100 MG/1
100 TABLET ORAL 2 TIMES DAILY PRN
Qty: 9 TABLET | Refills: 6 | Status: SHIPPED | OUTPATIENT
Start: 2018-02-22 | End: 2018-09-04 | Stop reason: SDUPTHER

## 2018-02-23 NOTE — TELEPHONE ENCOUNTER
For the first few hours, she received 50-75% relief of her knee pain, which is a positive diagnostic block.  However, she would like to keep her focus on her back, where she only received 30% relief.  Please contact her to set her up for a bilateral L2-5 MBB with 1 mg niravam for Thursday, March 1, 2018.  Final levels TBD under fluoro    Thanks!  LW

## 2018-02-26 ENCOUNTER — PATIENT MESSAGE (OUTPATIENT)
Dept: NEPHROLOGY | Facility: CLINIC | Age: 68
End: 2018-02-26

## 2018-02-28 RX ORDER — CLONIDINE HYDROCHLORIDE 0.1 MG/1
TABLET ORAL
Qty: 90 TABLET | Refills: 3 | Status: SHIPPED | OUTPATIENT
Start: 2018-02-28 | End: 2018-05-29 | Stop reason: SDUPTHER

## 2018-03-02 ENCOUNTER — TELEPHONE (OUTPATIENT)
Dept: PAIN MEDICINE | Facility: CLINIC | Age: 68
End: 2018-03-02

## 2018-03-02 NOTE — TELEPHONE ENCOUNTER
----- Message from Irina Winkler LPN sent at 3/2/2018  1:13 PM CST -----  See below  ----- Message -----  From: Hayes Hsieh RN  Sent: 3/2/2018  11:57 AM  To: [facility] Pain Management Schedulers    Pt cx procedure next Thursday, wants to talk to dr about alternatives

## 2018-03-02 NOTE — TELEPHONE ENCOUNTER
----- Message from Jalyn Booker sent at 3/2/2018 11:58 AM CST -----  Contact: pt   x 1st Request  _ 2nd Request  _ 3rd Request    Who:pt     Why:Pt is requesting a call back in regards to exploring different options to treat her pain than to have the injection schedule for next month. Please call and advise.     What Number to Call Back:[phone]     When to Expect a call back: (Before the end of the day)  -- if call after 3:00 call back will be tomorrow.

## 2018-03-02 NOTE — TELEPHONE ENCOUNTER
Called and spoke with patient regarding other options for her pain.  Patient stated that she would like to speak with Dr. Torres.  Patient was informed that Dr. Torres give her a call back, but it maybe on Monday morning.  Patient verbalized understanding.

## 2018-03-06 ENCOUNTER — OFFICE VISIT (OUTPATIENT)
Dept: UROLOGY | Facility: CLINIC | Age: 68
End: 2018-03-06
Payer: MEDICARE

## 2018-03-06 VITALS
SYSTOLIC BLOOD PRESSURE: 113 MMHG | HEIGHT: 68 IN | DIASTOLIC BLOOD PRESSURE: 60 MMHG | BODY MASS INDEX: 44.41 KG/M2 | WEIGHT: 293 LBS | HEART RATE: 88 BPM

## 2018-03-06 DIAGNOSIS — Z93.59 CHRONIC SUPRAPUBIC CATHETER: ICD-10-CM

## 2018-03-06 DIAGNOSIS — R33.9 URINARY RETENTION: ICD-10-CM

## 2018-03-06 DIAGNOSIS — N31.9 NEUROGENIC BLADDER: Primary | ICD-10-CM

## 2018-03-06 DIAGNOSIS — Z43.5 ENCOUNTER FOR CARE OR REPLACEMENT OF SUPRAPUBIC TUBE: ICD-10-CM

## 2018-03-06 PROCEDURE — 51705 CHANGE OF BLADDER TUBE: CPT | Mod: S$PBB,,, | Performed by: NURSE PRACTITIONER

## 2018-03-06 PROCEDURE — 99213 OFFICE O/P EST LOW 20 MIN: CPT | Mod: PBBFAC,25 | Performed by: NURSE PRACTITIONER

## 2018-03-06 PROCEDURE — 99214 OFFICE O/P EST MOD 30 MIN: CPT | Mod: S$PBB,25,, | Performed by: NURSE PRACTITIONER

## 2018-03-06 PROCEDURE — 51705 CHANGE OF BLADDER TUBE: CPT | Mod: PBBFAC | Performed by: NURSE PRACTITIONER

## 2018-03-06 PROCEDURE — 99999 PR PBB SHADOW E&M-EST. PATIENT-LVL III: CPT | Mod: PBBFAC,,, | Performed by: NURSE PRACTITIONER

## 2018-03-06 RX ORDER — INSULIN GLARGINE 100 [IU]/ML
INJECTION, SOLUTION SUBCUTANEOUS
Qty: 15 SYRINGE | Refills: 3 | Status: SHIPPED | OUTPATIENT
Start: 2018-03-06 | End: 2018-10-01 | Stop reason: CLARIF

## 2018-03-06 NOTE — PROGRESS NOTES
Subjective:       Patient ID: Cecile Bowen is a 67 y.o. female.    Chief Complaint: Neurogenic Bladder (SPT Change)    Cecile Bowen is a 67 y.o. Female with history of bilateral hydronephrosis, incomplete bladder emptying which is managed by SPT (16fr).  Her last SPT change was 02/06/2018.     Here today for scheduled SPT change.   She voices no urological complaints.   SPT is draining well.  No fever, n/v        Last visit with Dr. Whittington was 11/10/2015.    12/10/2015:  Procedure(s) Performed:   1. Cystoscopy with bladder botox injection  2. Silver nitrate to suprapubic catheter site  3 . Change suprapubic catheter tube            Past Medical History:    Diabetes type 2, uncontrolled                   12/12/2012    Obesity                                         12/12/2012    Hypertension                                    12/12/2012    DJD (degenerative joint disease)                12/12/2012    Hypothyroidism                                  12/12/2012    Nuclear sclerosis - Both Eyes                   3/24/2014     Migraine                                                      Past Surgical History:    TOTAL ABDOMINAL HYSTERECTOMY W/ BILATERAL SALP*  1985          GALLBLADDER SURGERY                              2006          TONSILLECTOMY, ADENOIDECTOMY                                   OVARIAN CYST SURGERY                             1985          HYSTERECTOMY                                                   DILATION AND CURETTAGE OF UTERUS                               Review of patient's family history indicates:    Diabetes                       Sister                    Kidney disease                 Sister                    ALS                            Mother                      Comment: d.    Cancer                         Maternal Grandmother        Comment: d. colon    Cancer                         Paternal Grandfather        Comment: d. lung    Diabetes                       Maternal  "Aunt             Kidney disease                 Maternal Aunt             Diabetes                       Maternal Uncle            Amblyopia                      Neg Hx                    Blindness                      Neg Hx                    Cataracts                      Neg Hx                    Glaucoma                       Neg Hx                    Macular degeneration           Neg Hx                    Retinal detachment             Neg Hx                    Strabismus                     Neg Hx                      Social History    Marital Status:             Spouse Name:                       Years of Education:                 Number of children:               Occupational History    None on file    Social History Main Topics    Smoking Status: Never Smoker                      Smokeless Status: Never Used                        Alcohol Use: No              Drug Use: No              Sexual Activity: Not Currently           Birth Control/Protection: Abstinence    Other Topics            Concern    None on file    Social History Narrative    Single      Lives w/ sister  And brother     both are helping her during her post hosp recovery period        Allergies:  Review of patient's allergies indicates no known allergies.    Medications:  Current outpatient prescriptions:amitriptyline (ELAVIL) 10 MG tablet, TAKE 3 TABLETS BY MOUTH IN THE EVENING, Disp: 90 tablet, Rfl: 5;  aspirin (ECOTRIN) 81 MG EC tablet, Take 81 mg by mouth once daily., Disp: , Rfl: ;  BD INSULIN PEN NEEDLE UF SHORT 31 X 5/16 " Ndle, USE ONCE DAILY WITH LANTUS SOLOSTAR PEN INSULIN, Disp: 100 each, Rfl: 11  butalbital-acetaminophen-caffeine -40 mg (FIORICET, ESGIC) -40 mg per tablet, TAKE 1 TABLET EVERY 4 TO 6 HOURS, Disp: 30 tablet, Rfl: 0;  cyanocobalamin, vitamin B-12, (VITAMIN B-12) 2,500 mcg Subl, Place 2,500 mcg under the tongue once daily., Disp: , Rfl: ;  diphenhydrAMINE (BENADRYL) 25 mg capsule, Take 25 mg by " "mouth every 6 (six) hours as needed., Disp: , Rfl:   ferrous sulfate 325 (65 FE) MG EC tablet, Take 325 mg by mouth 3 (three) times daily with meals., Disp: , Rfl: ;  fluticasone (FLONASE) 50 mcg/actuation nasal spray, 1 SPRAY IN EACH NOSTRIL DAILY (Patient taking differently: 1 SPRAY IN EACH NOSTRIL DAILY PRN), Disp: 16 g, Rfl: 1;  furosemide (LASIX) 20 MG tablet, Take 1 tablet (20 mg total) by mouth once daily., Disp: 4 tablet, Rfl: 0  gemfibrozil (LOPID) 600 MG tablet, TAKE 1 TABLET BY MOUTH TWICE A DAY, Disp: 60 tablet, Rfl: 5;  (START ON 4/29/2015) hydrocodone-acetaminophen 10-325mg (NORCO)  mg Tab, Take 1 tablet by mouth every 6 (six) hours as needed., Disp: 120 tablet, Rfl: 0;  insulin lispro (HUMALOG) 100 unit/mL injection, Inject 7 Units into the skin 3 (three) times daily before meals., Disp: 6.3 mL, Rfl: 11  insulin syringe-needle U-100 (BD INSULIN SYRINGE ULTRA-FINE) 1/2 mL 31 x 15/64" Syrg, 1 Box by Misc.(Non-Drug; Combo Route) route 4 (four) times daily., Disp: 100 Syringe, Rfl: 12;  lancets (ONE TOUCH DELICA LANCETS) Misc, Ultra 2 lancets to check blood sugar BID, Disp: 100 each, Rfl: 6;  LANTUS SOLOSTAR 100 unit/mL (3 mL) InPn pen, , Disp: , Rfl: 3  LIDODERM 5 %(700 mg/patch), APPLY 1 PATCH TO SKIN DAILY (LEAVING ON FOR 12 HOURS AND OFF FOR 12 HOURS), Disp: 30 patch, Rfl: 6;  magnesium oxide (MAG-OX) 400 mg tablet, TAKE 1 TABLET (400 MG TOTAL) BY MOUTH 2 (TWO) TIMES DAILY., Disp: 60 tablet, Rfl: 11;  methocarbamol (ROBAXIN) 750 MG Tab, TAKE 1 TO 2 TABLETS BY MOUTH 3 TIMES A DAY (Patient taking differently: Take 2 tablets twice daily), Disp: 120 tablet, Rfl: 3  methocarbamol (ROBAXIN) 750 MG Tab, TAKE 1 TO 2 TABLETS BY MOUTH 3 TIMES A DAY, Disp: 120 tablet, Rfl: 3;  methylPREDNISolone (MEDROL DOSEPACK) 4 mg tablet, use as directed, Disp: 21 tablet, Rfl: 0;  multivitamin with minerals tablet, Take 1 tablet by mouth once daily., Disp: , Rfl: ;  pravastatin (PRAVACHOL) 40 MG tablet, TAKE 1 TABLET " BY MOUTH EVERY DAY, Disp: 30 tablet, Rfl: 2  pyridoxine (B-6) 100 MG Tab, Take 1 tablet (100 mg total) by mouth once daily., Disp: 30 tablet, Rfl: 12;  scopolamine (TRANSDERM-SCOP) 1.5 mg, Place 1 patch (1.5 mg total) onto the skin every 72 hours., Disp: 4 patch, Rfl: 0;  sulfamethoxazole-trimethoprim 800-160mg (BACTRIM DS) 800-160 mg Tab, Take 1 tablet by mouth 2 (two) times daily., Disp: , Rfl: 0  sumatriptan (IMITREX) 100 MG tablet, TAKE 1 TABLET (100 MG TOTAL) BY MOUTH ONCE., Disp: 9 tablet, Rfl: 6;  SYNTHROID 125 mcg tablet, TAKE 1 TABLET BY MOUTH DAILY, Disp: 90 tablet, Rfl: 4;  topiramate (TOPAMAX) 100 MG tablet, TAKE 1 TABLET (100 MG TOTAL) BY MOUTH 2 (TWO) TIMES DAILY., Disp: 60 tablet, Rfl: 11;  topiramate (TOPAMAX) 25 MG tablet, Take 4 tablets (100 mg total) by mouth 2 (two) times daily., Disp: 240 tablet, Rfl: 5  venlafaxine (EFFEXOR-XR) 150 MG Cp24, TAKE 1 CAPSULE BY MOUTH ONCE DAILY, Disp: 30 capsule, Rfl: 2;  vitamin D (VITAMIN D3) 185 MG Tab, Take 185 mg by mouth once daily., Disp: , Rfl:           Review of Systems   Constitutional: Negative.  Negative for chills and fever.   HENT: Negative for facial swelling and trouble swallowing.    Eyes: Negative for visual disturbance.   Respiratory: Negative for cough, chest tightness, shortness of breath and wheezing.    Cardiovascular: Negative for chest pain and palpitations.   Gastrointestinal: Negative for abdominal pain, constipation, nausea and vomiting.   Genitourinary: Positive for difficulty urinating. Negative for dysuria, hematuria, urgency and vaginal bleeding.        SPT draining well     Musculoskeletal: Positive for arthralgias and gait problem.   Skin: Negative for rash.   Neurological: Positive for headaches. Negative for dizziness.        Has a migraine.     Hematological: Does not bruise/bleed easily.   Psychiatric/Behavioral: Negative for behavioral problems.       Objective:      Physical Exam   Nursing note and vitals  reviewed.  Constitutional: She is oriented to person, place, and time. Vital signs are normal. She appears well-developed and well-nourished. She is active and cooperative.   HENT:   Head: Normocephalic.   Right Ear: External ear normal.   Left Ear: External ear normal.   Nose: Nose normal.   Eyes: Conjunctivae and lids are normal. Right eye exhibits no discharge. Left eye exhibits no discharge. No scleral icterus.   Neck: Trachea normal and normal range of motion. Neck supple. No tracheal deviation present.   Cardiovascular: Normal rate, regular rhythm and intact distal pulses.    Pulmonary/Chest: Effort normal. No respiratory distress.   Abdominal: Soft. Bowel sounds are normal. She exhibits no distension and no mass. There is no tenderness. There is no rebound, no guarding and no CVA tenderness.       Musculoskeletal: Normal range of motion. She exhibits no edema.   Neurological: She is alert and oriented to person, place, and time.   Skin: Skin is warm, dry and intact.     Psychiatric: She has a normal mood and affect. Her behavior is normal. Judgment and thought content normal.       Assessment:       1. Neurogenic bladder    2. Urinary retention    3. Chronic suprapubic catheter    4. Encounter for care or replacement of suprapubic tube        Plan:           I spent 25 minutes with the patient of which more than half was spent in direct consultation with the patient in regards to our treatment and plan.    Education and recommendations of today's plan of care including home remedies.  We discussed daily care; keeping surrounding tissue clean and dry.  I removed old SPT without difficulty.  I placed a new 16fr SPT easily using sterile technique.   Irrigated to verify the position.  Balloon inflated with 9cc sterile water; plug placed.  Skin barrier cream and dsg applied to site.   RTC one month

## 2018-03-06 NOTE — PROGRESS NOTES
Subjective:       Patient ID: Cecile Bowen is a 67 y.o. White female who presents for follow-up evaluation of Follow-up (CKD) and Chronic Kidney Disease    HPI     She reports she is doing well. She went on a 7 day cruise since last visit. No new medications. Last Hba1c was 7.5. Her mobility remains poor ans she continues to use a scooter.     Review of Systems   Constitutional: Negative for activity change, appetite change, fatigue and fever.   HENT: Negative for facial swelling.    Eyes: Negative for visual disturbance.   Respiratory: Negative for shortness of breath.    Cardiovascular: Positive for leg swelling. Negative for chest pain.   Gastrointestinal: Negative for constipation and diarrhea.   Genitourinary: Negative for difficulty urinating, dysuria and hematuria.   Musculoskeletal: Positive for arthralgias and back pain.   Neurological: Negative for weakness and headaches.   Psychiatric/Behavioral: Negative for sleep disturbance.       Objective:      Physical Exam   Constitutional: She is oriented to person, place, and time. She appears well-nourished.   HENT:   Mouth/Throat: Oropharynx is clear and moist.   Neck: No JVD present.   Cardiovascular: S1 normal and S2 normal.  Exam reveals no friction rub.    Pulmonary/Chest: Breath sounds normal. She has no wheezes. She has no rales.   Abdominal: Soft.   Musculoskeletal: She exhibits edema.   Neurological: She is alert and oriented to person, place, and time.   Skin: Skin is warm and dry.   Psychiatric: She has a normal mood and affect.   Vitals reviewed.      Assessment:       1. CKD (chronic kidney disease) stage 4, GFR 15-29 ml/min    2. CKD (chronic kidney disease), stage IV    3. Essential hypertension    4. Secondary hyperparathyroidism, renal    5. Morbid obesity due to excess calories    6. Iron deficiency anemia, unspecified iron deficiency anemia type    7. Metabolic acidosis    8. Chronic suprapubic catheter    9. Vitamin D deficiency disease     10. VIJAYA (obstructive sleep apnea)    11. Osteopenia, unspecified location    12. Primary osteoarthritis involving multiple joints        Plan:           CKD Stage 4 with stable kidney function and proteinuria.     Mineral and Bone Disease--PTH at goal. Continue calcitriol and D3. She is on exogenous bicarbonate for metabolic acidosis    HTN is controlled    UTI hx given suprapubic catheter--continue Urology follow up    DM--improved    Morbid Obesity--her weight gain continues. This is very worrisome. Consider bariatric surgery      RTC 4 months

## 2018-03-07 ENCOUNTER — TELEPHONE (OUTPATIENT)
Dept: PAIN MEDICINE | Facility: CLINIC | Age: 68
End: 2018-03-07

## 2018-03-07 NOTE — TELEPHONE ENCOUNTER
Contacted and spoke to patient regarding her treatment options.     She has been scheduled for a follow up to discuss treatment options.

## 2018-03-07 NOTE — TELEPHONE ENCOUNTER
----- Message from Darlin Miller MA sent at 3/2/2018  4:32 PM CST -----  April Torres MD routed conversation to Brian Chen S Staff 1 hour ago (3:30 PM)    April Torres MD 1 hour ago (3:29 PM)          Please contact her to discuss options.      Documentation     April Torres MD 1 hour ago (3:29 PM)          ----- Message from Irina Winkler LPN sent at 3/2/2018  1:13 PM CST -----  See below  ----- Message -----  From: Hayes Hsieh RN  Sent: 3/2/2018  11:57 AM  To: [facility] Pain Management Schedulers     Pt cx procedure next Thursday, wants to talk to dr about alternatives            Documentation

## 2018-03-10 RX ORDER — PRAVASTATIN SODIUM 40 MG/1
TABLET ORAL
Qty: 30 TABLET | Refills: 5 | Status: SHIPPED | OUTPATIENT
Start: 2018-03-10 | End: 2018-08-28 | Stop reason: SDUPTHER

## 2018-03-12 ENCOUNTER — OFFICE VISIT (OUTPATIENT)
Dept: PAIN MEDICINE | Facility: CLINIC | Age: 68
End: 2018-03-12
Attending: ANESTHESIOLOGY
Payer: MEDICARE

## 2018-03-12 VITALS
HEIGHT: 68 IN | DIASTOLIC BLOOD PRESSURE: 81 MMHG | SYSTOLIC BLOOD PRESSURE: 144 MMHG | HEART RATE: 58 BPM | TEMPERATURE: 99 F

## 2018-03-12 DIAGNOSIS — E66.01 MORBID OBESITY WITH BMI OF 45.0-49.9, ADULT: ICD-10-CM

## 2018-03-12 DIAGNOSIS — R53.81 PHYSICAL DECONDITIONING: ICD-10-CM

## 2018-03-12 DIAGNOSIS — M79.10 MYALGIA: Primary | ICD-10-CM

## 2018-03-12 DIAGNOSIS — M54.6 THORACIC SPINE PAIN: ICD-10-CM

## 2018-03-12 DIAGNOSIS — M62.830 LUMBAR PARASPINAL MUSCLE SPASM: ICD-10-CM

## 2018-03-12 DIAGNOSIS — M25.562 CHRONIC PAIN OF LEFT KNEE: ICD-10-CM

## 2018-03-12 DIAGNOSIS — M47.816 FACET ARTHRITIS OF LUMBAR REGION: ICD-10-CM

## 2018-03-12 DIAGNOSIS — G89.29 CHRONIC PAIN OF LEFT KNEE: ICD-10-CM

## 2018-03-12 DIAGNOSIS — M47.816 LUMBAR SPONDYLOSIS: ICD-10-CM

## 2018-03-12 PROCEDURE — 99999 PR PBB SHADOW E&M-EST. PATIENT-LVL III: CPT | Mod: PBBFAC,,, | Performed by: ANESTHESIOLOGY

## 2018-03-12 PROCEDURE — 99213 OFFICE O/P EST LOW 20 MIN: CPT | Mod: PBBFAC | Performed by: ANESTHESIOLOGY

## 2018-03-12 PROCEDURE — 99214 OFFICE O/P EST MOD 30 MIN: CPT | Mod: S$PBB,GC,, | Performed by: ANESTHESIOLOGY

## 2018-03-12 RX ORDER — BUPIVACAINE HYDROCHLORIDE 5 MG/ML
10 INJECTION, SOLUTION EPIDURAL; INTRACAUDAL
Status: COMPLETED | OUTPATIENT
Start: 2018-03-12 | End: 2018-03-12

## 2018-03-12 RX ADMIN — BUPIVACAINE HYDROCHLORIDE 50 MG: 5 INJECTION, SOLUTION EPIDURAL; INTRACAUDAL; PERINEURAL at 04:03

## 2018-03-12 NOTE — PROGRESS NOTES
Subjective:     Patient ID: Cecile Bowen is a 67 y.o. female.    Chief Complaint: Pain    Consulted by: Dr. Enrique Henao     Disclaimer: This note was generated using voice recognition software.  There may be a typographical errors that were missed during proofreading.      HPI:    Cecile Bowen is a 67 y.o. female who presents today with low back pain. Her back pain has been present for 20+ years.  She has been diagnosed with degenerative disc disease (20 years ago), levoscoliosis (5 years ago), OA of the spine.  Her pain has been worsening over time, worse in the last decade, and severe for the past few years.  The worst of her pain is in her low back. The pain is equal bilaterally and does not radiate.  No associated numbness, tingling, or muscle weakness.  She has noticed that she has lost some muscle tone.  This pain is described in detail below.    She was medically screened by the FRP and found to not be a current candidate.      She has recently had x-rays of her thoracic and lumbar spine that we will review today.    Interval History (3/12/2018)  Patient returns today s/p bilateral MBB L1,2,3 and left genicular nerve blocks. She reports at least 70% reduction in knee pain, but minimal (15%, at most) reduction in pain in the lower back. Her pain is similar location and distribution as previous, but slightly higher intensity and worsening spasms. Patient denies bowel/bladder incontinence or saddle anesthesia. Currently taking Robaxin 1500 TID and norco 10/325 up to 4 pills per day, both of which are helpful.       Physical Therapy: Yes, at [place], most recent was 2 visits in 2014.  She did a course for a couple of months before that    Non-pharmacologic Treatment:     · Ice/Heat: Heat helps  · TENS: Not tried  · Massage: Not tried  · Chiropractic care: Not tried  · Acupuncture: Not tried  · Other: Use a scooter. Has a recliner lift. No other assistive devices         Pain Medications:          · Currently taking: Norco 10/325 mg BID-QID PRN, Topamax, Robaxin 1500 mg TID, Excedrin migraine, Imitrex, topamax, Effexor, Biofreeze    · Has tried in the past:  Flexeril, Baclofen, gabapentin, memantine, amitriptyline, Zoloft, Lidoderm patches    · Has not tried: NSAIDs, Tylenol, Lyrica, Rx topical creams    Blood thinners: None    Interventional Therapies:   · 2/2018: bilateral MBB L1,2,3 (negative 15% pain relief) and left genicular nerve blocks (positive, 70% pain relief)    Relevant Surgeries: None    Affecting sleep? Yes, she is getting about 2-3 hours at a time    Affecting daily activities? Yes    Depressive symptoms? Treated for depression          · SI/HI? No    Work status: Retired , 3 rd grade.  Retired due to pain    Prescription Monitoring Program database:  Reviewed and consistent with medication use as prescribed.    Pain Scales  Best: 5/10  Worst: 9/10  Usually: 7/10  Today: 7/10    Back Pain   This is a chronic problem. The current episode started more than 1 year ago. The problem occurs constantly. The problem has been rapidly worsening since onset. The pain is present in the lumbar spine, sacro-iliac and thoracic spine. The pain does not radiate (having seperate pains in Bilateral Hip and Knees). The pain is at a severity of 7/10. The pain is moderate. The pain is the same all the time. The symptoms are aggravated by standing, bending and coughing (walking, lifting and getting up from a sitting position). Associated symptoms include headaches. Pertinent negatives include no chest pain, fever or weight loss. She has tried NSAIDs, muscle relaxant, injection treatment and heat (injection treatments in knees) for the symptoms. Physical therapy was effective and ineffective.      Last 3 PDI Scores 3/12/2018 2/8/2018 2/8/2018   Pain Disability Index (PDI) [phone]       Review of Systems   Constitution: Negative for chills, fever, malaise/fatigue, weight gain and weight loss.   HENT:  Negative for ear pain and hoarse voice.    Eyes: Negative for blurred vision, pain and visual disturbance.   Cardiovascular: Negative for chest pain, dyspnea on exertion and irregular heartbeat.   Respiratory: Negative for cough, shortness of breath and wheezing.    Endocrine: Negative for cold intolerance and heat intolerance.   Hematologic/Lymphatic: Negative for adenopathy and bleeding problem. Does not bruise/bleed easily.   Skin: Negative for color change, itching and rash.   Musculoskeletal: Positive for back pain, joint pain, muscle cramps and stiffness.   Gastrointestinal: Negative for change in bowel habit, diarrhea, hematemesis, hematochezia, melena and vomiting.   Genitourinary: Negative for flank pain, frequency, hematuria and urgency.   Neurological: Positive for headaches and loss of balance. Negative for difficulty with concentration, dizziness and seizures.   Psychiatric/Behavioral: Negative for altered mental status, depression and suicidal ideas. The patient is not nervous/anxious.    Allergic/Immunologic: Negative for HIV exposure.             Past Medical History:   Diagnosis Date    Back pain     CKD (chronic kidney disease) stage 3, GFR 30-59 ml/min     as per patient    Diabetes type 2, uncontrolled 12/12/2012    DJD (degenerative joint disease) 12/12/2012    Hypertension 12/12/2012    Hypothyroidism 12/12/2012    Migraine     Nuclear sclerosis - Both Eyes 3/24/2014    Obesity 12/12/2012       Past Surgical History:   Procedure Laterality Date    BREAST BIOPSY Right     benign    CHOLECYSTECTOMY      COLONOSCOPY N/A 1/13/2017    Procedure: COLONOSCOPY;  Surgeon: Morris Wiseman MD;  Location: [address];  Service: Endoscopy;  Laterality: N/A;  Renal pt Nephrology advised to avoid phosphate preps    DILATION AND CURETTAGE OF UTERUS      GALLBLADDER SURGERY  2006    OVARIAN CYST SURGERY  1985    spt placement      TONSILLECTOMY, ADENOIDECTOMY      TOTAL ABDOMINAL  "HYSTERECTOMY W/ BILATERAL SALPINGOOPHORECTOMY  1985       Review of patient's allergies indicates:  No Known Allergies    Current Outpatient Prescriptions   Medication Sig Dispense Refill    aspirin-acetaminophen-caffeine 250-250-65 mg (EXCEDRIN MIGRAINE) 250-250-65 mg per tablet Take 1 tablet by mouth every 6 (six) hours as needed for Pain.      BD INSULIN PEN NEEDLE UF SHORT 31 gauge x 5/16" Ndle USE ONCE DAILY WITH LANTUS SOLOSTAR PEN INSULIN 150 each 3    BD INSULIN SYRINGE ULTRA-FINE 1/2 mL 31 gauge x 15/64" Syrg USE WITH INSULIN 4 TIMES A  Syringe 4    cloNIDine (CATAPRES) 0.1 MG tablet One po q8 hours prn SBP > 170 90 tablet 3    cyanocobalamin, vitamin B-12, (VITAMIN B-12) 5,000 mcg Subl Place 1 tablet under the tongue once daily.      diclofenac sodium 1 % Gel Apply 2 g topically daily as needed. 1 Tube 5    ergocalciferol (VITAMIN D2) 50,000 unit Cap TAKE 1 CAPSULE (50,000 UNITS TOTAL) BY MOUTH EVERY 7 DAYS. 4 capsule 4    ferrous sulfate 325 (65 FE) MG EC tablet Take 325 mg by mouth once daily.       gemfibrozil (LOPID) 600 MG tablet Patient takes one tablet every other day 60 tablet 3    hydrocodone-acetaminophen 10-325mg (NORCO)  mg Tab Take 1 tablet by mouth every 6 (six) hours as needed. 120 tablet 0    insulin lispro (HUMALOG) 100 unit/mL injection Inject under the skin 7 units in the AM, 10 units for lunch, 12 units for Dinner , before meals 10 mL 11    lancets (ONE TOUCH DELICA LANCETS) Misc Ultra 2 lancets to check blood sugar  each 6    LANTUS SOLOSTAR 100 unit/mL (3 mL) InPn pen INJECT 19 UNITS INTO THE SKIN EVERY EVENING. 15 Syringe 3    LANTUS SOLOSTAR U-100 INSULIN 100 unit/mL (3 mL) InPn pen INJECT 19 UNITS INTO THE SKIN EVERY EVENING. 15 Syringe 3    levothyroxine (SYNTHROID) 50 MCG tablet Take 1 tablet (50 mcg total) by mouth once daily. 30 tablet 11    lidocaine (LIDODERM) 5 % APPLY 1 PATCH TO SKIN 12 HOURS ON 12 HOURS OFF 30 patch 6    magnesium oxide " (MAG-OX) 400 mg tablet TAKE 1 TABLET (400 MG TOTAL) BY MOUTH 2 (TWO) TIMES DAILY. 180 tablet 3    methocarbamol (ROBAXIN) 750 MG Tab TAKE 1-2 TABLETS BY MOUTH 4 TIMES DAILY AS NEEDED 180 tablet 6    multivitamin with minerals tablet Take 1 tablet by mouth once daily.      oxybutynin (DITROPAN-XL) 10 MG 24 hr tablet Take 10 mg by mouth once daily.  9    pravastatin (PRAVACHOL) 40 MG tablet TAKE 1 TABLET BY MOUTH EVERY DAY 30 tablet 5    riboflavin, vitamin B2, 400 mg Tab Take 400 mg by mouth once daily. 90 tablet 3    scopolamine (TRANSDERM-SCOP) 1.3-1.5 mg (1 mg over 3 days) Place 1 patch onto the skin every 72 hours. 4 patch 0    sodium bicarbonate 650 MG tablet TAKE 1 TABLET (650 MG TOTAL) BY MOUTH 3 (THREE) TIMES DAILY. 270 tablet 3    sumatriptan (IMITREX) 100 MG tablet Take 1 tablet (100 mg total) by mouth 2 (two) times daily as needed for Migraine. 9 tablet 11    sumatriptan (IMITREX) 100 MG tablet TAKE 1 TABLET (100 MG TOTAL) BY MOUTH 2 (TWO) TIMES DAILY AS NEEDED FOR MIGRAINE. 9 tablet 6    topiramate (TOPAMAX) 200 MG Tab Take 1 tablet (200 mg total) by mouth 2 (two) times daily. 180 tablet 3    venlafaxine (EFFEXOR-XR) 75 MG 24 hr capsule Take 2 capsules (150 mg total) by mouth once daily. 180 capsule 1     Current Facility-Administered Medications   Medication Dose Route Frequency Provider Last Rate Last Dose    onabotulinumtoxina injection 200 Units  200 Units Intramuscular Q90 Days PRUDENCE Mcfarland           Family History   Problem Relation Age of Onset    Diabetes Sister     Kidney disease Sister      CKD III    ALS Mother      d.    Cancer Maternal Grandmother      d. colon    Cancer Paternal Grandfather      d. lung    Scoliosis Brother      increased pain    Prostate cancer Brother     Diabetes Maternal Aunt     Kidney disease Maternal Aunt     Diabetes Maternal Uncle     Amblyopia Neg Hx     Blindness Neg Hx     Cataracts Neg Hx     Glaucoma Neg Hx     Macular  "degeneration Neg Hx     Retinal detachment Neg Hx     Strabismus Neg Hx        Social History     Social History    Marital status:      Spouse name: N/A    Number of children: N/A    Years of education: N/A     Occupational History    Not on file.     Social History Main Topics    Smoking status: Never Smoker    Smokeless tobacco: Never Used    Alcohol use No    Drug use: No    Sexual activity: Not Currently     Birth control/ protection: Abstinence     Other Topics Concern    Not on file     Social History Narrative    Single        Lives w/ sister  And brother     both are helping her during her post hosp recovery period       Objective:     Vitals:    03/12/18 1453   BP: (!) 144/81   Pulse: (!) 58   Temp: 98.8 °F (37.1 °C)   TempSrc: Oral   Height: 5' 8" (1.727 m)       GEN:  Well developed, well nourished.  No acute distress.   HEENT:  No trauma.  Mucous membranes moist.  Nares patent bilaterally.  PSYCH: Normal affect. Thought content appropriate.  CHEST:  Breathing symmetric.  No audible wheezing.  ABD: Soft, non-distended.  SKIN:  Warm, pink, dry.  No rash on exposed areas.    EXT:  No cyanosis, clubbing, or edema.  No color change or changes in nail or hair growth.  NEURO/MUSCULOSKELETAL:  Fully alert, oriented, and appropriate. Speech normal sen. No cranial nerve deficits.   Gait: Antalgic, cannot ambulate without assistance.  Present trendelenburg sign bilaterally.   Motor Strength: 5/5 motor strength throughout lower extremities.   Sensory: No sensory deficit in the lower extremities.   L-Spine:  Markedly decreased ROM with pain on extension, lateral rotation. Positive facet loading bilaterally in upper lumbar spine.    Diffuse TTP over lumbar paraspinals > bilateral SI joints        Imaging:        The imaging studies listed below were independently reviewed by me, and I agree with the findings as documented below.     Narrative     Two views of the lumbar spine submitted. "      Moderate degenerative change seen in the lumbar spine with multilevel interspace narrowing and vacuum disks.  Vertebral body heights are fairly well maintained.  Mild levoscoliosis seen.  Lateral alignment is reasonably good.   Impression      As above.      Electronically signed by: Real Rasmussen MD  Date: 12/05/14  Time: 10:47     Narrative     5 views: There is moderate DJD at L2-L3, mild elsewhere. Alignment is normal. No fracture dislocation bone destruction or pars defect seen.   Impression      DJD.      Electronically signed by: ANANT LICONA MD  Date: 01/31/18  Time: 12:30      Narrative     2 views of the thoracic spine were obtained, with AP and lateral projections submitted.  Comparison is made to a thoracic CT examination of 2/9/17.    No significant alignment abnormality.  Vertebral body heights are adequately maintained, without significant compression deformity at any level.  Note is made of disc narrowing and vacuum phenomena indicative of disc desiccation at both T7-8 and T8-9, with minimal marginal vertebral endplate spurring at these levels, findings which can also be appreciated on the thoracic CT examination of 2/9/17.  Vertebral endplates are well-defined.  No osseous destructive process or paraspinal soft tissue mass.  Right upper quadrant surgical clips are incidentally observed.   Impression      Disc narrowing, minimal marginal vertebral endplate spurring, and vacuum phenomena indicative of disc desiccation are again seen at both the T7-8 and T8-9 levels.  No evidence of compression fracture or other detrimental change since the CT exam of 2/9/17 is appreciated.      Electronically signed by: Nithin Valadez MD  Date: 01/31/18  Time: 12:37          Assessment:     Encounter Diagnoses   Name Primary?    Myalgia Yes    Lumbar spondylosis     Facet arthritis of lumbar region     Lumbar paraspinal muscle spasm     Physical deconditioning     Thoracic spine pain     Chronic pain of left  knee     Morbid obesity with BMI of 45.0-49.9, adult        Plan:     Cecile was seen today for back pain and headache.    Diagnoses and all orders for this visit:    Myalgia  -     bupivacaine (PF) 0.5% (5 mg/ml) injection 50 mg; Inject 10 mLs (50 mg total) into the muscle one time.    Lumbar spondylosis    Facet arthritis of lumbar region    Lumbar paraspinal muscle spasm    Physical deconditioning    Thoracic spine pain    Chronic pain of left knee    Morbid obesity with BMI of 45.0-49.9, adult       Her pain is consistent with the above.    We discussed the assessment and recommendations.  All available images were reviewed. We discussed the disease process, prognosis, treatment plan, and risks and benefits. The patient is aware of the risks and benefits of the medications being prescribed, common side effects, and proper usage. The following is the plan we agreed on:     1. Schedule for L2,L3,L4,L5 MBB  1. If positive, RFA  2. If negative, MRI  2. Left knee genicular nerve blocks with good response, but patient does not want knee RFA at this time as lumbar spine more bothersome at the moment.   3. Lumbar myofascial Trigger point injections today in clinic  4. TENS unit info provided to patient today  5. Patient also asked about Quell Machine. This was discussed, and it does not appear this would be covered by insurance.   6. Again, she will likely need extensive PT in the future.  If she is able to build up her endurance and stamina, she would absolutely be a candidate for FRP in the future if needed.  7. RTC in 3-6 weeks or sooner if needed.    Trigger Point Injection Procedure note:   The procedure was discussed with the patient including complications of damage, bleeding, infection, and failure of pain relief.     All medications, allergies, and relevant histories were reviewed. No recent antibiotics or infections.  A time-out was taken to verify the correct patient, procedure, laterality, and appropriate  medications/allergies.    Trigger points were identified by palpation and marked. CHG prep of sites done. A 27-gauge needle was advanced to the point of maximal tenderness, and 2.5 mL of 0.5% bupivacaine was injected after negative aspiration. All sites done in the same manner. Patient tolerated the procedure well and without complications. Sites injected included: bilateral lumbar paraspinal muscles x 4       The patient tolerated the procedure well and was discharged in excellent condition.    Adán Gonsalez MD, Pain Fellow    I have seen the patient with the fellow physician.  I have performed my own history and physical exam and we have come up with the above plan.  The patient is in agreement with our plan.    I certify that I provided the above services.  I was present for the entire procedure, which was performed by the fellow physician under my supervision.  There were no parts of the procedure that were performed not by myself or without my direct supervision.    The above plan and management options were discussed at length with patient. Patient is in agreement with the above and verbalized understanding. It will be communicated with the referring physician via electronic record, fax, or mail.

## 2018-03-12 NOTE — PATIENT INSTRUCTIONS
Currently, Medicare does not have a TENS unit provider.  I am not sure when this will be corrected.  For all of our Medicare patients, I am recommending that you order a TENS unit online.  This ends up being cheaper and faster than going through insurance.    Go to this website: www.tenspros.com    The first listing is for the TENS 7000, which is $35.  This is the unit I recommend.    The next listing is for the extra electrodes, although these are also available when you click on the link for the TENS 7000. (See below)      You can then follow the website instructions to complete your order. The total cost ends up being ~$45 including shipping.

## 2018-03-15 ENCOUNTER — SURGERY (OUTPATIENT)
Age: 68
End: 2018-03-15

## 2018-03-15 ENCOUNTER — HOSPITAL ENCOUNTER (OUTPATIENT)
Facility: OTHER | Age: 68
Discharge: HOME | End: 2018-03-15
Attending: ANESTHESIOLOGY | Admitting: ANESTHESIOLOGY
Payer: MEDICARE

## 2018-03-15 ENCOUNTER — OFFICE VISIT (OUTPATIENT)
Dept: RHEUMATOLOGY | Facility: CLINIC | Age: 68
End: 2018-03-15
Payer: MEDICARE

## 2018-03-15 VITALS
HEART RATE: 53 BPM | SYSTOLIC BLOOD PRESSURE: 123 MMHG | BODY MASS INDEX: 47.14 KG/M2 | DIASTOLIC BLOOD PRESSURE: 86 MMHG | WEIGHT: 293 LBS

## 2018-03-15 VITALS
DIASTOLIC BLOOD PRESSURE: 88 MMHG | SYSTOLIC BLOOD PRESSURE: 177 MMHG | HEIGHT: 68 IN | RESPIRATION RATE: 18 BRPM | HEART RATE: 92 BPM | TEMPERATURE: 98 F | BODY MASS INDEX: 44.41 KG/M2 | WEIGHT: 293 LBS | OXYGEN SATURATION: 97 %

## 2018-03-15 DIAGNOSIS — M47.816 SPONDYLOSIS OF LUMBAR REGION WITHOUT MYELOPATHY OR RADICULOPATHY: Primary | ICD-10-CM

## 2018-03-15 DIAGNOSIS — M79.7 FIBROMYALGIA: ICD-10-CM

## 2018-03-15 DIAGNOSIS — Z79.891 LONG TERM PRESCRIPTION OPIATE USE: ICD-10-CM

## 2018-03-15 DIAGNOSIS — G89.29 CHRONIC PAIN: ICD-10-CM

## 2018-03-15 DIAGNOSIS — M15.9 PRIMARY OSTEOARTHRITIS INVOLVING MULTIPLE JOINTS: Primary | ICD-10-CM

## 2018-03-15 LAB — POCT GLUCOSE: 252 MG/DL (ref 70–110)

## 2018-03-15 PROCEDURE — 64494 INJ PARAVERT F JNT L/S 2 LEV: CPT | Mod: 50,,, | Performed by: ANESTHESIOLOGY

## 2018-03-15 PROCEDURE — 64493 INJ PARAVERT F JNT L/S 1 LEV: CPT | Mod: 50 | Performed by: ANESTHESIOLOGY

## 2018-03-15 PROCEDURE — 64495 INJ PARAVERT F JNT L/S 3 LEV: CPT | Mod: 50 | Performed by: ANESTHESIOLOGY

## 2018-03-15 PROCEDURE — 99213 OFFICE O/P EST LOW 20 MIN: CPT | Mod: PBBFAC | Performed by: INTERNAL MEDICINE

## 2018-03-15 PROCEDURE — S0020 INJECTION, BUPIVICAINE HYDRO: HCPCS | Performed by: ANESTHESIOLOGY

## 2018-03-15 PROCEDURE — 99999 PR PBB SHADOW E&M-EST. PATIENT-LVL III: CPT | Mod: PBBFAC,,, | Performed by: INTERNAL MEDICINE

## 2018-03-15 PROCEDURE — 25000003 PHARM REV CODE 250: Performed by: ANESTHESIOLOGY

## 2018-03-15 PROCEDURE — 64494 INJ PARAVERT F JNT L/S 2 LEV: CPT | Mod: 50 | Performed by: ANESTHESIOLOGY

## 2018-03-15 PROCEDURE — 99213 OFFICE O/P EST LOW 20 MIN: CPT | Mod: S$PBB,,, | Performed by: INTERNAL MEDICINE

## 2018-03-15 PROCEDURE — 82947 ASSAY GLUCOSE BLOOD QUANT: CPT | Performed by: ANESTHESIOLOGY

## 2018-03-15 PROCEDURE — 64495 INJ PARAVERT F JNT L/S 3 LEV: CPT | Mod: 50,,, | Performed by: ANESTHESIOLOGY

## 2018-03-15 PROCEDURE — 64493 INJ PARAVERT F JNT L/S 1 LEV: CPT | Mod: 50,,, | Performed by: ANESTHESIOLOGY

## 2018-03-15 RX ORDER — HYDROCODONE BITARTRATE AND ACETAMINOPHEN 10; 325 MG/1; MG/1
1 TABLET ORAL EVERY 6 HOURS PRN
Qty: 120 TABLET | Refills: 0 | Status: SHIPPED | OUTPATIENT
Start: 2018-04-12 | End: 2018-03-15 | Stop reason: SDUPTHER

## 2018-03-15 RX ORDER — BUPIVACAINE HYDROCHLORIDE 5 MG/ML
INJECTION, SOLUTION EPIDURAL; INTRACAUDAL
Status: DISCONTINUED | OUTPATIENT
Start: 2018-03-15 | End: 2018-03-15 | Stop reason: HOSPADM

## 2018-03-15 RX ORDER — HYDROCODONE BITARTRATE AND ACETAMINOPHEN 10; 325 MG/1; MG/1
1 TABLET ORAL EVERY 6 HOURS PRN
Qty: 120 TABLET | Refills: 0 | Status: SHIPPED | OUTPATIENT
Start: 2018-05-10 | End: 2018-06-14 | Stop reason: SDUPTHER

## 2018-03-15 RX ORDER — SODIUM CHLORIDE 9 MG/ML
500 INJECTION, SOLUTION INTRAVENOUS CONTINUOUS
Status: DISCONTINUED | OUTPATIENT
Start: 2018-03-15 | End: 2018-03-15 | Stop reason: HOSPADM

## 2018-03-15 RX ORDER — LIDOCAINE HYDROCHLORIDE 10 MG/ML
INJECTION INFILTRATION; PERINEURAL
Status: DISCONTINUED | OUTPATIENT
Start: 2018-03-15 | End: 2018-03-15 | Stop reason: HOSPADM

## 2018-03-15 RX ORDER — HYDROCODONE BITARTRATE AND ACETAMINOPHEN 10; 325 MG/1; MG/1
1 TABLET ORAL EVERY 6 HOURS PRN
Qty: 120 TABLET | Refills: 0 | Status: SHIPPED | OUTPATIENT
Start: 2018-03-15 | End: 2018-03-15 | Stop reason: SDUPTHER

## 2018-03-15 RX ADMIN — BUPIVACAINE HYDROCHLORIDE 10 ML: 5 INJECTION, SOLUTION EPIDURAL; INTRACAUDAL; PERINEURAL at 09:03

## 2018-03-15 RX ADMIN — LIDOCAINE HYDROCHLORIDE 10 ML: 10 INJECTION, SOLUTION INFILTRATION; PERINEURAL at 09:03

## 2018-03-15 RX ADMIN — SODIUM BICARBONATE 4 MEQ: 0.2 INJECTION, SOLUTION INTRAVENOUS at 09:03

## 2018-03-15 NOTE — OP NOTE
Date of Procedure: 03/15/2018    Procedure: Bilateral L2-5 Lumbar medial branch blocks    Pre-op diagnosis: Lumbar spondylosis [M47.816]    Post-op diagnosis: Lumbar spondylosis [M47.816]     Physician: Dr. April Torres     Assistant: Dr. Gonsalez    Anesthestia: local    EBL: None    Specimens: None    All medications, allergies, and relevant histories were reviewed. No recent antibiotics or infections.  A time-out was taken to verify the correct patient, procedure, laterality, and appropriate medications/allergies.    Lumbar Medial Branch Block:   The procedure risks, benefits, and possible complications were discussed with the patient including nerve damage, spinal headache, bleeding, infection, and failure of pain relief.   Patient was placed in the prone position with the midriff elevated. Oblique view of the spine was obtained with fluoroscopy. Entry sites marked over the skin. The skin was prepped with chlorhexidine x3 and draped. Sterile precautions observed throughout the procedure. Xylocaine 1% was infiltrated locally over the entry site. A 22-gauge spinal needle was introduced at an angle, and the needle was placed onto the junction of the superior articular process and the most supermedial point on the transverse process. Placement was confirmed with a fluoroscopic view. After negative aspiration, 1cc of bupivacaine 0.5% was injected at each level. Needle was restyletted and removed. Procedure performed at the levels indicated: Right L2-5, Left:L2-5.     Patient tolerated the procedure well and there were no complications.   The patient was discharged home with a responsible adult.      Future Management:   If good results, can proceed to RFA.  If no relief, can follow up to discuss options.    I certify that I provided the above services.  I was present for the entire procedure, which was performed by the fellow physician under my supervision.  There were no parts of the procedure that were performed not  by myself or without my direct supervision.

## 2018-03-15 NOTE — DISCHARGE INSTRUCTIONS
Thank you for allowing us to care for you today. You may receive a survey about the care we provided. Your feedback is valuable and helps us provide excellent care throughout the community. Home Care Instructions Pain Management:    1. DIET:   You may resume your normal diet today.   2. BATHING:   You may shower with luke warm water. No soaking in tub.  3. DRESSING:   You may remove your bandage today.   4. ACTIVITY LEVEL:   You may resume your normal activities 24 hrs after your procedure.  5. MEDICATIONS:   You may resume your normal medications today.   6. SPECIAL INSTRUCTIONS:   No heat to the injection site for 24 hrs including, bath or shower, heating pad, moist heat, or hot tubs.    Use ice pack to injection site for any pain or discomfort.  Apply ice packs for 20 minute intervals as needed.   If you have received any sedatives by mouth today you may not drive for 12 hours.    If you have received any sedation through your IV, you may not drive for 24 hrs.     PLEASE CALL YOUR DOCTOR IF:  1. Redness or swelling around the injection site.  2. Fever of 101 degrees  3. Drainage (pus) from the injection site.  4. For any continuous bleeding (some dried blood over the incision is normal.)  5. For severe headache that is relieved when lying flat.    FOR EMERGENCIES:   If any unusual problems or difficulties occur during clinic hours, call [phone] or [phone].

## 2018-03-15 NOTE — PROGRESS NOTES
History of present illness: 67-year-old female with peripheral neuropathy and neurogenic bladder.  She has chronic pain due to osteoarthritis and fibromyalgia.  She is on chronic narcotic therapy.  She was evaluated by pain management.  She had a geniculate block in her knee with some response.  She has had a lumbar facet block but this did not help her.  Earlier today she had a lumbar nerve block and she thinks this may be helping.  Her headache has improved.  Her bladder has been stable.  She may require further Botox treatments.  She has had no other recent medical problems.    Physical examination was not performed, the entire time was counseling.    Assessment: Chronic pain syndrome    Plans: I refilled her pain medications for the next 3 months.  She is to return to see me in 3 months.

## 2018-03-16 ENCOUNTER — TELEPHONE (OUTPATIENT)
Dept: PAIN MEDICINE | Facility: CLINIC | Age: 68
End: 2018-03-16

## 2018-03-16 NOTE — TELEPHONE ENCOUNTER
----- Message from Jalyn Booker sent at 3/16/2018 10:14 AM CDT -----  Contact: pt   x 1st Request  _ 2nd Request  _ 3rd Request    Who:pt     Why:Pt pain diary score: post procedure 10:30am pain level was a 3 and pain relief 63%, 11:30am am to 4pm pain level was 3 and 75% pain relief. Bed time pain level was a 4 and 50% pain relief, 9:30am this morning pain level is a 8 and 0% pain relief. Please call and advise      What Number to Call Back:[phone]     When to Expect a call back: (Before the end of the day)  -- if call after 3:00 call back will be tomorrow.

## 2018-03-16 NOTE — TELEPHONE ENCOUNTER
Great! That is a positive diagnostic block. The next step is the RFA, L2-5, on the side of choice, followed by the other side 2 weeks later. IV sedation, 30 min, Venom.  Thanks! - LW

## 2018-03-26 RX ORDER — METHOCARBAMOL 750 MG/1
TABLET, FILM COATED ORAL
Qty: 180 TABLET | Refills: 0 | Status: SHIPPED | OUTPATIENT
Start: 2018-03-26 | End: 2018-04-18 | Stop reason: SDUPTHER

## 2018-03-31 ENCOUNTER — PATIENT MESSAGE (OUTPATIENT)
Dept: UROLOGY | Facility: CLINIC | Age: 68
End: 2018-03-31

## 2018-04-02 ENCOUNTER — HOSPITAL ENCOUNTER (OUTPATIENT)
Facility: OTHER | Age: 68
Discharge: HOME | End: 2018-04-02
Attending: ANESTHESIOLOGY | Admitting: ANESTHESIOLOGY
Payer: MEDICARE

## 2018-04-02 VITALS
TEMPERATURE: 98 F | SYSTOLIC BLOOD PRESSURE: 132 MMHG | HEIGHT: 68 IN | DIASTOLIC BLOOD PRESSURE: 86 MMHG | OXYGEN SATURATION: 97 % | RESPIRATION RATE: 16 BRPM | WEIGHT: 293 LBS | BODY MASS INDEX: 44.41 KG/M2 | HEART RATE: 104 BPM

## 2018-04-02 DIAGNOSIS — E66.01 MORBID OBESITY DUE TO EXCESS CALORIES: ICD-10-CM

## 2018-04-02 DIAGNOSIS — M47.816 LUMBAR SPONDYLOSIS: Primary | ICD-10-CM

## 2018-04-02 LAB — POCT GLUCOSE: 272 MG/DL (ref 70–110)

## 2018-04-02 PROCEDURE — 63600175 PHARM REV CODE 636 W HCPCS: Performed by: ANESTHESIOLOGY

## 2018-04-02 PROCEDURE — 82947 ASSAY GLUCOSE BLOOD QUANT: CPT | Performed by: ANESTHESIOLOGY

## 2018-04-02 PROCEDURE — 99152 MOD SED SAME PHYS/QHP 5/>YRS: CPT | Mod: ,,, | Performed by: ANESTHESIOLOGY

## 2018-04-02 PROCEDURE — 25000003 PHARM REV CODE 250: Performed by: ANESTHESIOLOGY

## 2018-04-02 PROCEDURE — 64636 DESTROY L/S FACET JNT ADDL: CPT | Performed by: ANESTHESIOLOGY

## 2018-04-02 PROCEDURE — 64635 DESTROY LUMB/SAC FACET JNT: CPT | Mod: RT,,, | Performed by: ANESTHESIOLOGY

## 2018-04-02 PROCEDURE — S0020 INJECTION, BUPIVICAINE HYDRO: HCPCS | Performed by: ANESTHESIOLOGY

## 2018-04-02 PROCEDURE — 64636 DESTROY L/S FACET JNT ADDL: CPT | Mod: RT,,, | Performed by: ANESTHESIOLOGY

## 2018-04-02 PROCEDURE — 64635 DESTROY LUMB/SAC FACET JNT: CPT | Performed by: ANESTHESIOLOGY

## 2018-04-02 RX ORDER — MIDAZOLAM HYDROCHLORIDE 1 MG/ML
INJECTION INTRAMUSCULAR; INTRAVENOUS
Status: DISCONTINUED | OUTPATIENT
Start: 2018-04-02 | End: 2018-04-02 | Stop reason: HOSPADM

## 2018-04-02 RX ORDER — FENTANYL CITRATE 50 UG/ML
INJECTION, SOLUTION INTRAMUSCULAR; INTRAVENOUS
Status: DISCONTINUED | OUTPATIENT
Start: 2018-04-02 | End: 2018-04-02 | Stop reason: HOSPADM

## 2018-04-02 RX ORDER — BUPIVACAINE HYDROCHLORIDE 5 MG/ML
INJECTION, SOLUTION EPIDURAL; INTRACAUDAL
Status: DISCONTINUED | OUTPATIENT
Start: 2018-04-02 | End: 2018-04-02 | Stop reason: HOSPADM

## 2018-04-02 RX ORDER — LIDOCAINE HYDROCHLORIDE 20 MG/ML
INJECTION, SOLUTION INFILTRATION; PERINEURAL
Status: DISCONTINUED | OUTPATIENT
Start: 2018-04-02 | End: 2018-04-02 | Stop reason: HOSPADM

## 2018-04-02 RX ORDER — SODIUM CHLORIDE 9 MG/ML
INJECTION, SOLUTION INTRAVENOUS CONTINUOUS
Status: DISCONTINUED | OUTPATIENT
Start: 2018-04-02 | End: 2018-04-02 | Stop reason: HOSPADM

## 2018-04-02 RX ADMIN — SODIUM CHLORIDE: 9 INJECTION, SOLUTION INTRAVENOUS at 11:04

## 2018-04-02 NOTE — DISCHARGE INSTRUCTIONS
Thank you for allowing us to care for you today. You may receive a survey about the care we provided. Your feedback is valuable and helps us provide excellent care throughout the community. Home Care Instructions Pain Management:    1. DIET:   You may resume your normal diet today.   2. BATHING:   You may shower with luke warm water.  3. DRESSING:   You may remove your bandage today.   4. ACTIVITY LEVEL:   You may resume your normal activities 24 hrs after your procedure.  5. MEDICATIONS:   You may resume your normal medications today.   6. SPECIAL INSTRUCTIONS:   No heat to the injection site for 24 hrs including, bath or shower, heating pad, moist heat, or hot tubs.    Use ice pack to injection site for any pain or discomfort.  Apply ice packs for 20 minute intervals as needed.   If you have received any sedatives by mouth today you may not drive for 12 hours.    If you have received any sedation through your IV, you may not drive for 24 hrs.     PLEASE CALL YOUR DOCTOR IF:  1. Redness or swelling around the injection site.  2. Fever of 101 degrees  3. Drainage (pus) from the injection site.  4. For any continuous bleeding (some dried blood over the incision is normal.)    FOR EMERGENCIES:   If any unusual problems or difficulties occur during clinic hours, call [phone] or [phone].   Adult Procedural Sedation Instructions    Recovery After Procedural Sedation (Adult)  You have been given medicine by vein to make you sleep during your surgery. This may have included both a pain medicine and sleeping medicine. Most of the effects have worn off. But you may still have some drowsiness for the next 6 to 8 hours.  Home care  Follow these guidelines when you get home:  · For the next 8 hours, you should be watched by a responsible adult. This person should make sure your condition is not getting worse.  · Don't drink any alcohol for the next 24 hours.  · Don't drive, operate dangerous machinery, or make important  business or personal decisions during the next 24 hours.  Note: Your healthcare provider may tell you not to take any medicine by mouth for pain or sleep in the next 4 hours. These medicines may react with the medicines you were given in the hospital. This could cause a much stronger response than usual.  Follow-up care  Follow up with your healthcare provider if you are not alert and back to your usual level of activity within 12 hours.  When to seek medical advice  Call your healthcare provider right away if any of these occur:  · Drowsiness gets worse  · Weakness or dizziness gets worse  · Repeated vomiting  · You can't be awakened   Date Last Reviewed: 10/18/2016  © 3227-6368 [employer]. [address] [place] [address], [place] [address]. All rights reserved. This information is not intended as a substitute for professional medical care. Always follow your healthcare professional's instructions.

## 2018-04-02 NOTE — OP NOTE
Date of Procedure: 04/02/2018    Procedure: Right L2-5 Lumbar Medial Branch Nerve Radiofrequency Ablation    Pre-op diagnosis: Lumbar spondylosis [M47.816]    Post-op diagnosis: Lumbar spondylosis [M47.816]     Physician: Dr. April Torres     Assistant: Dr. Allen    Anesthestia: local/IV sedation:  Versed 3 mg and fentanyl 100 mcg IV.  Conscious sedation provided by MD and monitored by RN.  Total sedation time was less than 45 minutes.    EBL: None    Specimens: None    All medications, allergies, and relevant histories were reviewed. No recent antibiotics or infections.  A time-out was taken to verify the correct patient, procedure, laterality, and appropriate medications/allergies.    Procedure: Lumbar RFA    Lumbar Medial Branch Block with conventional radiofrequency, levels L2-5, right     The procedure risks, benefits, and possible complications were discussed with the patient including nerve damage, infection, spinal headache, and paresis.   Patient was placed in the prone position with the midriff elevated. Skin was prepped with CHG and draped. Oblique view of the spine was obtained with fluoroscopy. Entry sites were marked over the skin and Xylocaine 1% was used to anesthetize the skin and subcutaneous tissues.     A 22-gauge 3.5 inch curved tip, insulated, RF needle was introduced at an angle to parallel the medial branches in the groove between superior articular process and transverse process, and L5 primary dorsal ramus at the junction of the S1 superior articular process and sacral ala.    Multifidus stim elicited at each level.  No distal motor stimulation was elicited at any level at 2V with a frequency of 2Hz.     1cc of 2% lidocaine was injected at each level.  Thermal RF was then conducted at each level at 80 degrees for 1:30 minutes x2 cycles.   1 cc of 0.5% bupivacaine was injected at each level.     Patient tolerated the procedure well and there were no complications.      Future Management:    If helpful, can repeat as needed.  Follow up with me in 4-6 weeks.      I certify that I provided the above services.  I was present for the entire procedure, which was performed by myself with the assistance of the resident physician.  There were no parts of the procedure that were performed not by myself or without my direct supervision.

## 2018-04-02 NOTE — H&P (VIEW-ONLY)
Subjective:     Patient ID: Cecile Bowen is a 67 y.o. female.    Chief Complaint: Pain    Consulted by: Dr. Enrique Henao     Disclaimer: This note was generated using voice recognition software.  There may be a typographical errors that were missed during proofreading.      HPI:    Cecile Bowen is a 67 y.o. female who presents today with low back pain. Her back pain has been present for 20+ years.  She has been diagnosed with degenerative disc disease (20 years ago), levoscoliosis (5 years ago), OA of the spine.  Her pain has been worsening over time, worse in the last decade, and severe for the past few years.  The worst of her pain is in her low back. The pain is equal bilaterally and does not radiate.  No associated numbness, tingling, or muscle weakness.  She has noticed that she has lost some muscle tone.  This pain is described in detail below.    She was medically screened by the FRP and found to not be a current candidate.      She has recently had x-rays of her thoracic and lumbar spine that we will review today.    Interval History (3/12/2018)  Patient returns today s/p bilateral MBB L1,2,3 and left genicular nerve blocks. She reports at least 70% reduction in knee pain, but minimal (15%, at most) reduction in pain in the lower back. Her pain is similar location and distribution as previous, but slightly higher intensity and worsening spasms. Patient denies bowel/bladder incontinence or saddle anesthesia. Currently taking Robaxin 1500 TID and norco 10/325 up to 4 pills per day, both of which are helpful.       Physical Therapy: Yes, at [facility], most recent was 2 visits in 2014.  She did a course for a couple of months before that    Non-pharmacologic Treatment:     · Ice/Heat: Heat helps  · TENS: Not tried  · Massage: Not tried  · Chiropractic care: Not tried  · Acupuncture: Not tried  · Other: Use a scooter. Has a recliner lift. No other assistive devices         Pain Medications:          · Currently taking: Norco 10/325 mg BID-QID PRN, Topamax, Robaxin 1500 mg TID, Excedrin migraine, Imitrex, topamax, Effexor, Biofreeze    · Has tried in the past:  Flexeril, Baclofen, gabapentin, memantine, amitriptyline, Zoloft, Lidoderm patches    · Has not tried: NSAIDs, Tylenol, Lyrica, Rx topical creams    Blood thinners: None    Interventional Therapies:   · 2/2018: bilateral MBB L1,2,3 (negative 15% pain relief) and left genicular nerve blocks (positive, 70% pain relief)    Relevant Surgeries: None    Affecting sleep? Yes, she is getting about 2-3 hours at a time    Affecting daily activities? Yes    Depressive symptoms? Treated for depression          · SI/HI? No    Work status: Retired , 3 rd grade.  Retired due to pain    Prescription Monitoring Program database:  Reviewed and consistent with medication use as prescribed.    Pain Scales  Best: 5/10  Worst: 9/10  Usually: 7/10  Today: 7/10    Back Pain   This is a chronic problem. The current episode started more than 1 year ago. The problem occurs constantly. The problem has been rapidly worsening since onset. The pain is present in the lumbar spine, sacro-iliac and thoracic spine. The pain does not radiate (having seperate pains in Bilateral Hip and Knees). The pain is at a severity of 7/10. The pain is moderate. The pain is the same all the time. The symptoms are aggravated by standing, bending and coughing (walking, lifting and getting up from a sitting position). Associated symptoms include headaches. Pertinent negatives include no chest pain, fever or weight loss. She has tried NSAIDs, muscle relaxant, injection treatment and heat (injection treatments in knees) for the symptoms. Physical therapy was effective and ineffective.      Last 3 PDI Scores 3/12/2018 2/8/2018 2/8/2018   Pain Disability Index (PDI) [phone]       Review of Systems   Constitution: Negative for chills, fever, malaise/fatigue, weight gain and weight loss.   HENT:  Negative for ear pain and hoarse voice.    Eyes: Negative for blurred vision, pain and visual disturbance.   Cardiovascular: Negative for chest pain, dyspnea on exertion and irregular heartbeat.   Respiratory: Negative for cough, shortness of breath and wheezing.    Endocrine: Negative for cold intolerance and heat intolerance.   Hematologic/Lymphatic: Negative for adenopathy and bleeding problem. Does not bruise/bleed easily.   Skin: Negative for color change, itching and rash.   Musculoskeletal: Positive for back pain, joint pain, muscle cramps and stiffness.   Gastrointestinal: Negative for change in bowel habit, diarrhea, hematemesis, hematochezia, melena and vomiting.   Genitourinary: Negative for flank pain, frequency, hematuria and urgency.   Neurological: Positive for headaches and loss of balance. Negative for difficulty with concentration, dizziness and seizures.   Psychiatric/Behavioral: Negative for altered mental status, depression and suicidal ideas. The patient is not nervous/anxious.    Allergic/Immunologic: Negative for HIV exposure.             Past Medical History:   Diagnosis Date    Back pain     CKD (chronic kidney disease) stage 3, GFR 30-59 ml/min     as per patient    Diabetes type 2, uncontrolled 12/12/2012    DJD (degenerative joint disease) 12/12/2012    Hypertension 12/12/2012    Hypothyroidism 12/12/2012    Migraine     Nuclear sclerosis - Both Eyes 3/24/2014    Obesity 12/12/2012       Past Surgical History:   Procedure Laterality Date    BREAST BIOPSY Right     benign    CHOLECYSTECTOMY      COLONOSCOPY N/A 1/13/2017    Procedure: COLONOSCOPY;  Surgeon: Morris Wiseman MD;  Location: [address];  Service: Endoscopy;  Laterality: N/A;  Renal pt Nephrology advised to avoid phosphate preps    DILATION AND CURETTAGE OF UTERUS      GALLBLADDER SURGERY  2006    OVARIAN CYST SURGERY  1985    spt placement      TONSILLECTOMY, ADENOIDECTOMY      TOTAL ABDOMINAL  "HYSTERECTOMY W/ BILATERAL SALPINGOOPHORECTOMY  1985       Review of patient's allergies indicates:  No Known Allergies    Current Outpatient Prescriptions   Medication Sig Dispense Refill    aspirin-acetaminophen-caffeine 250-250-65 mg (EXCEDRIN MIGRAINE) 250-250-65 mg per tablet Take 1 tablet by mouth every 6 (six) hours as needed for Pain.      BD INSULIN PEN NEEDLE UF SHORT 31 gauge x 5/16" Ndle USE ONCE DAILY WITH LANTUS SOLOSTAR PEN INSULIN 150 each 3    BD INSULIN SYRINGE ULTRA-FINE 1/2 mL 31 gauge x 15/64" Syrg USE WITH INSULIN 4 TIMES A  Syringe 4    cloNIDine (CATAPRES) 0.1 MG tablet One po q8 hours prn SBP > 170 90 tablet 3    cyanocobalamin, vitamin B-12, (VITAMIN B-12) 5,000 mcg Subl Place 1 tablet under the tongue once daily.      diclofenac sodium 1 % Gel Apply 2 g topically daily as needed. 1 Tube 5    ergocalciferol (VITAMIN D2) 50,000 unit Cap TAKE 1 CAPSULE (50,000 UNITS TOTAL) BY MOUTH EVERY 7 DAYS. 4 capsule 4    ferrous sulfate 325 (65 FE) MG EC tablet Take 325 mg by mouth once daily.       gemfibrozil (LOPID) 600 MG tablet Patient takes one tablet every other day 60 tablet 3    hydrocodone-acetaminophen 10-325mg (NORCO)  mg Tab Take 1 tablet by mouth every 6 (six) hours as needed. 120 tablet 0    insulin lispro (HUMALOG) 100 unit/mL injection Inject under the skin 7 units in the AM, 10 units for lunch, 12 units for Dinner , before meals 10 mL 11    lancets (ONE TOUCH DELICA LANCETS) Misc Ultra 2 lancets to check blood sugar  each 6    LANTUS SOLOSTAR 100 unit/mL (3 mL) InPn pen INJECT 19 UNITS INTO THE SKIN EVERY EVENING. 15 Syringe 3    LANTUS SOLOSTAR U-100 INSULIN 100 unit/mL (3 mL) InPn pen INJECT 19 UNITS INTO THE SKIN EVERY EVENING. 15 Syringe 3    levothyroxine (SYNTHROID) 50 MCG tablet Take 1 tablet (50 mcg total) by mouth once daily. 30 tablet 11    lidocaine (LIDODERM) 5 % APPLY 1 PATCH TO SKIN 12 HOURS ON 12 HOURS OFF 30 patch 6    magnesium oxide " (MAG-OX) 400 mg tablet TAKE 1 TABLET (400 MG TOTAL) BY MOUTH 2 (TWO) TIMES DAILY. 180 tablet 3    methocarbamol (ROBAXIN) 750 MG Tab TAKE 1-2 TABLETS BY MOUTH 4 TIMES DAILY AS NEEDED 180 tablet 6    multivitamin with minerals tablet Take 1 tablet by mouth once daily.      oxybutynin (DITROPAN-XL) 10 MG 24 hr tablet Take 10 mg by mouth once daily.  9    pravastatin (PRAVACHOL) 40 MG tablet TAKE 1 TABLET BY MOUTH EVERY DAY 30 tablet 5    riboflavin, vitamin B2, 400 mg Tab Take 400 mg by mouth once daily. 90 tablet 3    scopolamine (TRANSDERM-SCOP) 1.3-1.5 mg (1 mg over 3 days) Place 1 patch onto the skin every 72 hours. 4 patch 0    sodium bicarbonate 650 MG tablet TAKE 1 TABLET (650 MG TOTAL) BY MOUTH 3 (THREE) TIMES DAILY. 270 tablet 3    sumatriptan (IMITREX) 100 MG tablet Take 1 tablet (100 mg total) by mouth 2 (two) times daily as needed for Migraine. 9 tablet 11    sumatriptan (IMITREX) 100 MG tablet TAKE 1 TABLET (100 MG TOTAL) BY MOUTH 2 (TWO) TIMES DAILY AS NEEDED FOR MIGRAINE. 9 tablet 6    topiramate (TOPAMAX) 200 MG Tab Take 1 tablet (200 mg total) by mouth 2 (two) times daily. 180 tablet 3    venlafaxine (EFFEXOR-XR) 75 MG 24 hr capsule Take 2 capsules (150 mg total) by mouth once daily. 180 capsule 1     Current Facility-Administered Medications   Medication Dose Route Frequency Provider Last Rate Last Dose    onabotulinumtoxina injection 200 Units  200 Units Intramuscular Q90 Days PRUDENCE Mcfarland           Family History   Problem Relation Age of Onset    Diabetes Sister     Kidney disease Sister      CKD III    ALS Mother      d.    Cancer Maternal Grandmother      d. colon    Cancer Paternal Grandfather      d. lung    Scoliosis Brother      increased pain    Prostate cancer Brother     Diabetes Maternal Aunt     Kidney disease Maternal Aunt     Diabetes Maternal Uncle     Amblyopia Neg Hx     Blindness Neg Hx     Cataracts Neg Hx     Glaucoma Neg Hx     Macular  "degeneration Neg Hx     Retinal detachment Neg Hx     Strabismus Neg Hx        Social History     Social History    Marital status:      Spouse name: N/A    Number of children: N/A    Years of education: N/A     Occupational History    Not on file.     Social History Main Topics    Smoking status: Never Smoker    Smokeless tobacco: Never Used    Alcohol use No    Drug use: No    Sexual activity: Not Currently     Birth control/ protection: Abstinence     Other Topics Concern    Not on file     Social History Narrative    Single        Lives w/ sister  And brother     both are helping her during her post hosp recovery period       Objective:     Vitals:    03/12/18 1453   BP: (!) 144/81   Pulse: (!) 58   Temp: 98.8 °F (37.1 °C)   TempSrc: Oral   Height: 5' 8" (1.727 m)       GEN:  Well developed, well nourished.  No acute distress.   HEENT:  No trauma.  Mucous membranes moist.  Nares patent bilaterally.  PSYCH: Normal affect. Thought content appropriate.  CHEST:  Breathing symmetric.  No audible wheezing.  ABD: Soft, non-distended.  SKIN:  Warm, pink, dry.  No rash on exposed areas.    EXT:  No cyanosis, clubbing, or edema.  No color change or changes in nail or hair growth.  NEURO/MUSCULOSKELETAL:  Fully alert, oriented, and appropriate. Speech normal sen. No cranial nerve deficits.   Gait: Antalgic, cannot ambulate without assistance.  Present trendelenburg sign bilaterally.   Motor Strength: 5/5 motor strength throughout lower extremities.   Sensory: No sensory deficit in the lower extremities.   L-Spine:  Markedly decreased ROM with pain on extension, lateral rotation. Positive facet loading bilaterally in upper lumbar spine.    Diffuse TTP over lumbar paraspinals > bilateral SI joints        Imaging:        The imaging studies listed below were independently reviewed by me, and I agree with the findings as documented below.     Narrative     Two views of the lumbar spine submitted. "      Moderate degenerative change seen in the lumbar spine with multilevel interspace narrowing and vacuum disks.  Vertebral body heights are fairly well maintained.  Mild levoscoliosis seen.  Lateral alignment is reasonably good.   Impression      As above.      Electronically signed by: Real Rasmussen MD  Date: 12/05/14  Time: 10:47     Narrative     5 views: There is moderate DJD at L2-L3, mild elsewhere. Alignment is normal. No fracture dislocation bone destruction or pars defect seen.   Impression      DJD.      Electronically signed by: ANANT LICONA MD  Date: 01/31/18  Time: 12:30      Narrative     2 views of the thoracic spine were obtained, with AP and lateral projections submitted.  Comparison is made to a thoracic CT examination of 2/9/17.    No significant alignment abnormality.  Vertebral body heights are adequately maintained, without significant compression deformity at any level.  Note is made of disc narrowing and vacuum phenomena indicative of disc desiccation at both T7-8 and T8-9, with minimal marginal vertebral endplate spurring at these levels, findings which can also be appreciated on the thoracic CT examination of 2/9/17.  Vertebral endplates are well-defined.  No osseous destructive process or paraspinal soft tissue mass.  Right upper quadrant surgical clips are incidentally observed.   Impression      Disc narrowing, minimal marginal vertebral endplate spurring, and vacuum phenomena indicative of disc desiccation are again seen at both the T7-8 and T8-9 levels.  No evidence of compression fracture or other detrimental change since the CT exam of 2/9/17 is appreciated.      Electronically signed by: Nithin Valadez MD  Date: 01/31/18  Time: 12:37          Assessment:     Encounter Diagnoses   Name Primary?    Myalgia Yes    Lumbar spondylosis     Facet arthritis of lumbar region     Lumbar paraspinal muscle spasm     Physical deconditioning     Thoracic spine pain     Chronic pain of left  knee     Morbid obesity with BMI of 45.0-49.9, adult        Plan:     Cecile was seen today for back pain and headache.    Diagnoses and all orders for this visit:    Myalgia  -     bupivacaine (PF) 0.5% (5 mg/ml) injection 50 mg; Inject 10 mLs (50 mg total) into the muscle one time.    Lumbar spondylosis    Facet arthritis of lumbar region    Lumbar paraspinal muscle spasm    Physical deconditioning    Thoracic spine pain    Chronic pain of left knee    Morbid obesity with BMI of 45.0-49.9, adult       Her pain is consistent with the above.    We discussed the assessment and recommendations.  All available images were reviewed. We discussed the disease process, prognosis, treatment plan, and risks and benefits. The patient is aware of the risks and benefits of the medications being prescribed, common side effects, and proper usage. The following is the plan we agreed on:     1. Schedule for L2,L3,L4,L5 MBB  1. If positive, RFA  2. If negative, MRI  2. Left knee genicular nerve blocks with good response, but patient does not want knee RFA at this time as lumbar spine more bothersome at the moment.   3. Lumbar myofascial Trigger point injections today in clinic  4. TENS unit info provided to patient today  5. Patient also asked about Quell Machine. This was discussed, and it does not appear this would be covered by insurance.   6. Again, she will likely need extensive PT in the future.  If she is able to build up her endurance and stamina, she would absolutely be a candidate for FRP in the future if needed.  7. RTC in 3-6 weeks or sooner if needed.    Trigger Point Injection Procedure note:   The procedure was discussed with the patient including complications of damage, bleeding, infection, and failure of pain relief.     All medications, allergies, and relevant histories were reviewed. No recent antibiotics or infections.  A time-out was taken to verify the correct patient, procedure, laterality, and appropriate  medications/allergies.    Trigger points were identified by palpation and marked. CHG prep of sites done. A 27-gauge needle was advanced to the point of maximal tenderness, and 2.5 mL of 0.5% bupivacaine was injected after negative aspiration. All sites done in the same manner. Patient tolerated the procedure well and without complications. Sites injected included: bilateral lumbar paraspinal muscles x 4       The patient tolerated the procedure well and was discharged in excellent condition.    Adán Gonsalez MD, Pain Fellow    I have seen the patient with the fellow physician.  I have performed my own history and physical exam and we have come up with the above plan.  The patient is in agreement with our plan.    I certify that I provided the above services.  I was present for the entire procedure, which was performed by the fellow physician under my supervision.  There were no parts of the procedure that were performed not by myself or without my direct supervision.    The above plan and management options were discussed at length with patient. Patient is in agreement with the above and verbalized understanding. It will be communicated with the referring physician via electronic record, fax, or mail.

## 2018-04-02 NOTE — INTERVAL H&P NOTE
The patient has been examined and the H&P has been reviewed. Here for procedure. R L2, L3, L4, L5 RFA.     No change in the location or quality of the pain since the most recent clinic visit.  No new symptoms.  She wishes to proceed with the procedure today.    PE, unchanged from previous:  CV:  RRR  Resp: unlabored, no wheezing.    NPO since MN.    Anesthesia/Surgery risks, benefits and alternative options discussed and understood by patient/family.          Active Hospital Problems    Diagnosis  POA    Lumbar spondylosis [M47.816]  Yes      Resolved Hospital Problems    Diagnosis Date Resolved POA   No resolved problems to display.

## 2018-04-03 NOTE — DISCHARGE SUMMARY
"Discharge Note  Short Stay      SUMMARY     Admit Date: 4/2/2018    Attending Physician: April Torres      Discharge Physician: April Torres      Discharge Date: 4/3/2018 2:00 PM    Final Diagnosis: Lumbar spondylosis [M47.816]    Disposition: Home or self care    Patient Instructions:   Discharge Medication List as of 4/2/2018 12:06 PM      CONTINUE these medications which have NOT CHANGED    Details   aspirin-acetaminophen-caffeine 250-250-65 mg (EXCEDRIN MIGRAINE) 250-250-65 mg per tablet Take 1 tablet by mouth every 6 (six) hours as needed for Pain., Historical Med      BD INSULIN PEN NEEDLE UF SHORT 31 gauge x 5/16" Ndle USE ONCE DAILY WITH LANTUS SOLOSTAR PEN INSULIN, Normal      BD INSULIN SYRINGE ULTRA-FINE 1/2 mL 31 gauge x 15/64" Syrg USE WITH INSULIN 4 TIMES A DAY, Normal      cloNIDine (CATAPRES) 0.1 MG tablet One po q8 hours prn SBP > 170, Normal      cyanocobalamin, vitamin B-12, (VITAMIN B-12) 5,000 mcg Subl Place 1 tablet under the tongue once daily., Until Discontinued, Historical Med      diclofenac sodium 1 % Gel Apply 2 g topically daily as needed., Starting Thu 5/18/2017, Until Fri 12/1/2017, Normal      ergocalciferol (VITAMIN D2) 50,000 unit Cap TAKE 1 CAPSULE (50,000 UNITS TOTAL) BY MOUTH EVERY 7 DAYS., Normal      ferrous sulfate 325 (65 FE) MG EC tablet Take 325 mg by mouth once daily. , Until Discontinued, Historical Med      gemfibrozil (LOPID) 600 MG tablet Patient takes one tablet every other day, Normal      hydrocodone-acetaminophen 10-325mg (NORCO)  mg Tab Take 1 tablet by mouth every 6 (six) hours as needed., Starting Thu 5/10/2018, Until Sat 6/9/2018, Print      insulin lispro (HUMALOG) 100 unit/mL injection Inject under the skin 7 units in the AM, 10 units for lunch, 12 units for Dinner , before meals, Normal      lancets (ONE TOUCH DELICA LANCETS) Misc Ultra 2 lancets to check blood sugar BID, Normal      !! LANTUS SOLOSTAR 100 unit/mL (3 mL) InPn pen INJECT 19 UNITS " INTO THE SKIN EVERY EVENING., Normal      !! LANTUS SOLOSTAR U-100 INSULIN 100 unit/mL (3 mL) InPn pen INJECT 19 UNITS INTO THE SKIN EVERY EVENING., Normal      levothyroxine (SYNTHROID) 50 MCG tablet Take 1 tablet (50 mcg total) by mouth once daily., Starting Tue 11/28/2017, Until Wed 11/28/2018, Normal      lidocaine (LIDODERM) 5 % APPLY 1 PATCH TO SKIN 12 HOURS ON 12 HOURS OFF, Normal      magnesium oxide (MAG-OX) 400 mg tablet TAKE 1 TABLET (400 MG TOTAL) BY MOUTH 2 (TWO) TIMES DAILY., Normal      methocarbamol (ROBAXIN) 750 MG Tab TAKE 1-2 TABLETS BY MOUTH 4 TIMES DAILY AS NEEDED, Normal      multivitamin with minerals tablet Take 1 tablet by mouth once daily., Until Discontinued, Historical Med      oxybutynin (DITROPAN-XL) 10 MG 24 hr tablet Take 10 mg by mouth once daily., Starting Thu 11/16/2017, Historical Med      pravastatin (PRAVACHOL) 40 MG tablet TAKE 1 TABLET BY MOUTH EVERY DAY, Normal      riboflavin, vitamin B2, 400 mg Tab Take 400 mg by mouth once daily., Starting Wed 8/23/2017, Normal      scopolamine (TRANSDERM-SCOP) 1.3-1.5 mg (1 mg over 3 days) Place 1 patch onto the skin every 72 hours., Starting Wed 9/27/2017, Normal      sodium bicarbonate 650 MG tablet TAKE 1 TABLET (650 MG TOTAL) BY MOUTH 3 (THREE) TIMES DAILY., Normal      !! sumatriptan (IMITREX) 100 MG tablet Take 1 tablet (100 mg total) by mouth 2 (two) times daily as needed for Migraine., Starting Thu 12/28/2017, Normal      !! sumatriptan (IMITREX) 100 MG tablet TAKE 1 TABLET (100 MG TOTAL) BY MOUTH 2 (TWO) TIMES DAILY AS NEEDED FOR MIGRAINE., Starting Thu 2/22/2018, Normal      topiramate (TOPAMAX) 200 MG Tab Take 1 tablet (200 mg total) by mouth 2 (two) times daily., Starting Wed 8/23/2017, Until Thu 8/23/2018, Print      venlafaxine (EFFEXOR-XR) 75 MG 24 hr capsule Take 2 capsules (150 mg total) by mouth once daily., Starting Wed 11/29/2017, Normal       !! - Potential duplicate medications found. Please discuss with provider.           Resume home diet and activity

## 2018-04-05 ENCOUNTER — TELEPHONE (OUTPATIENT)
Dept: INTERNAL MEDICINE | Facility: CLINIC | Age: 68
End: 2018-04-05

## 2018-04-05 ENCOUNTER — OFFICE VISIT (OUTPATIENT)
Dept: UROLOGY | Facility: CLINIC | Age: 68
End: 2018-04-05
Payer: MEDICARE

## 2018-04-05 VITALS
TEMPERATURE: 98 F | DIASTOLIC BLOOD PRESSURE: 89 MMHG | BODY MASS INDEX: 44.41 KG/M2 | HEART RATE: 89 BPM | WEIGHT: 293 LBS | HEIGHT: 68 IN | SYSTOLIC BLOOD PRESSURE: 213 MMHG

## 2018-04-05 DIAGNOSIS — N31.9 NEUROGENIC BLADDER: Primary | ICD-10-CM

## 2018-04-05 DIAGNOSIS — Z93.59 CHRONIC SUPRAPUBIC CATHETER: ICD-10-CM

## 2018-04-05 DIAGNOSIS — Z43.5 ENCOUNTER FOR CARE OR REPLACEMENT OF SUPRAPUBIC TUBE: ICD-10-CM

## 2018-04-05 PROCEDURE — 51705 CHANGE OF BLADDER TUBE: CPT | Mod: S$PBB,,, | Performed by: NURSE PRACTITIONER

## 2018-04-05 PROCEDURE — 99999 PR PBB SHADOW E&M-EST. PATIENT-LVL III: CPT | Mod: PBBFAC,,, | Performed by: NURSE PRACTITIONER

## 2018-04-05 PROCEDURE — 51705 CHANGE OF BLADDER TUBE: CPT | Mod: PBBFAC | Performed by: NURSE PRACTITIONER

## 2018-04-05 PROCEDURE — 99214 OFFICE O/P EST MOD 30 MIN: CPT | Mod: S$PBB,25,, | Performed by: NURSE PRACTITIONER

## 2018-04-05 PROCEDURE — 99213 OFFICE O/P EST LOW 20 MIN: CPT | Mod: PBBFAC,25 | Performed by: NURSE PRACTITIONER

## 2018-04-05 NOTE — TELEPHONE ENCOUNTER
Pt called. No answer. Left message to call back to discuss. Advised that may need an office visit.

## 2018-04-05 NOTE — TELEPHONE ENCOUNTER
Spoke to pt. Pt stated that she went in today to have her tubing changed. Her bp was 212/89. Pt concerned.  Pt advised to take her clonidine as ordered by Dr. López.   Pt stated that she had forgotten about this medication. She will start it. She stated that she will keep a log of her readings today, and inform Dr. López's office tomorrow.  Pt also advised that if she should start feeling any symptoms to go to the ER.  Pt ok with this.

## 2018-04-05 NOTE — PROGRESS NOTES
Subjective:       Patient ID: Cecile Bowen is a 67 y.o. female.    Chief Complaint: Follow-up and Neurogenic Bladder (SPT Change)    Cecile Bowen is a 67 y.o. Female with history of bilateral hydronephrosis, incomplete bladder emptying which is managed by SPT (16fr).  Her last SPT change was 03/06/2018.     Here today for scheduled SPT change.   She voices no urological complaints.   SPT is draining well.  No fever, n/v        Last visit with Dr. Whittington was 11/10/2015.    12/10/2015:  Procedure(s) Performed:   1. Cystoscopy with bladder botox injection  2. Silver nitrate to suprapubic catheter site  3 . Change suprapubic catheter tube            Past Medical History:    Diabetes type 2, uncontrolled                   12/12/2012    Obesity                                         12/12/2012    Hypertension                                    12/12/2012    DJD (degenerative joint disease)                12/12/2012    Hypothyroidism                                  12/12/2012    Nuclear sclerosis - Both Eyes                   3/24/2014     Migraine                                                      Past Surgical History:    TOTAL ABDOMINAL HYSTERECTOMY W/ BILATERAL SALP*  1985          GALLBLADDER SURGERY                              2006          TONSILLECTOMY, ADENOIDECTOMY                                   OVARIAN CYST SURGERY                             1985          HYSTERECTOMY                                                   DILATION AND CURETTAGE OF UTERUS                               Review of patient's family history indicates:    Diabetes                       Sister                    Kidney disease                 Sister                    ALS                            Mother                      Comment: d.    Cancer                         Maternal Grandmother        Comment: d. colon    Cancer                         Paternal Grandfather        Comment: d. lung    Diabetes                    "    Maternal Aunt             Kidney disease                 Maternal Aunt             Diabetes                       Maternal Uncle            Amblyopia                      Neg Hx                    Blindness                      Neg Hx                    Cataracts                      Neg Hx                    Glaucoma                       Neg Hx                    Macular degeneration           Neg Hx                    Retinal detachment             Neg Hx                    Strabismus                     Neg Hx                      Social History    Marital Status:             Spouse Name:                       Years of Education:                 Number of children:               Occupational History    None on file    Social History Main Topics    Smoking Status: Never Smoker                      Smokeless Status: Never Used                        Alcohol Use: No              Drug Use: No              Sexual Activity: Not Currently           Birth Control/Protection: Abstinence    Other Topics            Concern    None on file    Social History Narrative    Single      Lives w/ sister  And brother     both are helping her during her post hosp recovery period        Allergies:  Review of patient's allergies indicates no known allergies.    Medications:  Current outpatient prescriptions:amitriptyline (ELAVIL) 10 MG tablet, TAKE 3 TABLETS BY MOUTH IN THE EVENING, Disp: 90 tablet, Rfl: 5;  aspirin (ECOTRIN) 81 MG EC tablet, Take 81 mg by mouth once daily., Disp: , Rfl: ;  BD INSULIN PEN NEEDLE UF SHORT 31 X 5/16 " Ndle, USE ONCE DAILY WITH LANTUS SOLOSTAR PEN INSULIN, Disp: 100 each, Rfl: 11  butalbital-acetaminophen-caffeine -40 mg (FIORICET, ESGIC) -40 mg per tablet, TAKE 1 TABLET EVERY 4 TO 6 HOURS, Disp: 30 tablet, Rfl: 0;  cyanocobalamin, vitamin B-12, (VITAMIN B-12) 2,500 mcg Subl, Place 2,500 mcg under the tongue once daily., Disp: , Rfl: ;  diphenhydrAMINE (BENADRYL) 25 mg capsule, " "Take 25 mg by mouth every 6 (six) hours as needed., Disp: , Rfl:   ferrous sulfate 325 (65 FE) MG EC tablet, Take 325 mg by mouth 3 (three) times daily with meals., Disp: , Rfl: ;  fluticasone (FLONASE) 50 mcg/actuation nasal spray, 1 SPRAY IN EACH NOSTRIL DAILY (Patient taking differently: 1 SPRAY IN EACH NOSTRIL DAILY PRN), Disp: 16 g, Rfl: 1;  furosemide (LASIX) 20 MG tablet, Take 1 tablet (20 mg total) by mouth once daily., Disp: 4 tablet, Rfl: 0  gemfibrozil (LOPID) 600 MG tablet, TAKE 1 TABLET BY MOUTH TWICE A DAY, Disp: 60 tablet, Rfl: 5;  (START ON 4/29/2015) hydrocodone-acetaminophen 10-325mg (NORCO)  mg Tab, Take 1 tablet by mouth every 6 (six) hours as needed., Disp: 120 tablet, Rfl: 0;  insulin lispro (HUMALOG) 100 unit/mL injection, Inject 7 Units into the skin 3 (three) times daily before meals., Disp: 6.3 mL, Rfl: 11  insulin syringe-needle U-100 (BD INSULIN SYRINGE ULTRA-FINE) 1/2 mL 31 x 15/64" Syrg, 1 Box by Misc.(Non-Drug; Combo Route) route 4 (four) times daily., Disp: 100 Syringe, Rfl: 12;  lancets (ONE TOUCH DELICA LANCETS) Misc, Ultra 2 lancets to check blood sugar BID, Disp: 100 each, Rfl: 6;  LANTUS SOLOSTAR 100 unit/mL (3 mL) InPn pen, , Disp: , Rfl: 3  LIDODERM 5 %(700 mg/patch), APPLY 1 PATCH TO SKIN DAILY (LEAVING ON FOR 12 HOURS AND OFF FOR 12 HOURS), Disp: 30 patch, Rfl: 6;  magnesium oxide (MAG-OX) 400 mg tablet, TAKE 1 TABLET (400 MG TOTAL) BY MOUTH 2 (TWO) TIMES DAILY., Disp: 60 tablet, Rfl: 11;  methocarbamol (ROBAXIN) 750 MG Tab, TAKE 1 TO 2 TABLETS BY MOUTH 3 TIMES A DAY (Patient taking differently: Take 2 tablets twice daily), Disp: 120 tablet, Rfl: 3  methocarbamol (ROBAXIN) 750 MG Tab, TAKE 1 TO 2 TABLETS BY MOUTH 3 TIMES A DAY, Disp: 120 tablet, Rfl: 3;  methylPREDNISolone (MEDROL DOSEPACK) 4 mg tablet, use as directed, Disp: 21 tablet, Rfl: 0;  multivitamin with minerals tablet, Take 1 tablet by mouth once daily., Disp: , Rfl: ;  pravastatin (PRAVACHOL) 40 MG tablet, " TAKE 1 TABLET BY MOUTH EVERY DAY, Disp: 30 tablet, Rfl: 2  pyridoxine (B-6) 100 MG Tab, Take 1 tablet (100 mg total) by mouth once daily., Disp: 30 tablet, Rfl: 12;  scopolamine (TRANSDERM-SCOP) 1.5 mg, Place 1 patch (1.5 mg total) onto the skin every 72 hours., Disp: 4 patch, Rfl: 0;  sulfamethoxazole-trimethoprim 800-160mg (BACTRIM DS) 800-160 mg Tab, Take 1 tablet by mouth 2 (two) times daily., Disp: , Rfl: 0  sumatriptan (IMITREX) 100 MG tablet, TAKE 1 TABLET (100 MG TOTAL) BY MOUTH ONCE., Disp: 9 tablet, Rfl: 6;  SYNTHROID 125 mcg tablet, TAKE 1 TABLET BY MOUTH DAILY, Disp: 90 tablet, Rfl: 4;  topiramate (TOPAMAX) 100 MG tablet, TAKE 1 TABLET (100 MG TOTAL) BY MOUTH 2 (TWO) TIMES DAILY., Disp: 60 tablet, Rfl: 11;  topiramate (TOPAMAX) 25 MG tablet, Take 4 tablets (100 mg total) by mouth 2 (two) times daily., Disp: 240 tablet, Rfl: 5  venlafaxine (EFFEXOR-XR) 150 MG Cp24, TAKE 1 CAPSULE BY MOUTH ONCE DAILY, Disp: 30 capsule, Rfl: 2;  vitamin D (VITAMIN D3) 185 MG Tab, Take 185 mg by mouth once daily., Disp: , Rfl:           Review of Systems   Constitutional: Negative.  Negative for chills and fever.        Does feel a little fatigued.     HENT: Negative for facial swelling and trouble swallowing.    Eyes: Negative for visual disturbance.   Respiratory: Negative for cough, chest tightness, shortness of breath and wheezing.    Cardiovascular: Negative for chest pain and palpitations.   Gastrointestinal: Negative for abdominal pain, constipation, nausea and vomiting.   Genitourinary: Positive for difficulty urinating. Negative for dysuria, hematuria, urgency and vaginal bleeding.        SPT draining well.     Musculoskeletal: Positive for arthralgias and gait problem.   Skin: Negative for rash.   Neurological: Negative for dizziness and headaches.   Hematological: Does not bruise/bleed easily.   Psychiatric/Behavioral: Negative for behavioral problems.       Objective:      Physical Exam   Nursing note and vitals  reviewed.  Constitutional: She is oriented to person, place, and time. Vital signs are normal. She appears well-developed and well-nourished. She is active and cooperative.   HENT:   Head: Normocephalic.   Right Ear: External ear normal.   Left Ear: External ear normal.   Nose: Nose normal.   Eyes: Conjunctivae and lids are normal. Right eye exhibits no discharge. Left eye exhibits no discharge. No scleral icterus.   Neck: Trachea normal and normal range of motion. Neck supple. No tracheal deviation present.   Cardiovascular: Normal rate, regular rhythm and intact distal pulses.    Pulmonary/Chest: Effort normal. No respiratory distress.   Abdominal: Soft. Bowel sounds are normal. She exhibits no distension and no mass. There is no tenderness. There is no rebound and no guarding.       Musculoskeletal: Normal range of motion. She exhibits no edema.   Neurological: She is alert and oriented to person, place, and time.   Skin: Skin is warm, dry and intact.     Psychiatric: She has a normal mood and affect. Her behavior is normal. Judgment and thought content normal.       Assessment:       1. Neurogenic bladder    2. Chronic suprapubic catheter    3. Encounter for care or replacement of suprapubic tube        Plan:         I spent 25 minutes with the patient of which more than half was spent in direct consultation with the patient in regards to our treatment and plan.    Education and recommendations of today's plan of care including home remedies.  We discussed daily care; keeping surrounding tissue clean and dry.  I removed old SPT without difficulty.  I placed a new 16fr SPT easily using sterile technique.   Irrigated to verify the position.  Balloon inflated with 9cc sterile water; plug placed.  Skin barrier cream and dsg applied to site.   RTC one month

## 2018-04-05 NOTE — TELEPHONE ENCOUNTER
----- Message from Maryann Owens sent at 4/5/2018  2:22 PM CDT -----  Contact: call pt [phone]  Patient is calling to let you know that her last blood pressure reading this morning was 212/89, she is concerned and would like to speak with someone to find out if she should come in for an appointment

## 2018-04-06 ENCOUNTER — TELEPHONE (OUTPATIENT)
Dept: NEPHROLOGY | Facility: CLINIC | Age: 68
End: 2018-04-06

## 2018-04-06 NOTE — TELEPHONE ENCOUNTER
----- Message from Mariya Wylie sent at 4/6/2018 11:14 AM CDT -----  Please call patient in regards to her BP readings  04/05/18   212/89  She states she took cloNIDine (CATAPRES) 0.1 MG tablet & BP came down   [phone] (home)

## 2018-04-06 NOTE — TELEPHONE ENCOUNTER
"Yesterday Chad in  NP office getting my tubing changed.  1415 it was 212/89.  At 230 she called my PCP office and the nurse advised to take clonidine.  An hour later    330p 143/77  445p 133/75.    7pm 132/79    1030p it was 133/78    States she hasn't even checked today because she felt so good.  I asked her to check it while we were on the phone so it could be documented.  It is 159/96.  She checked it on the other arm and it was 148/95.      She is "taking it easy today" and reports with a laugh, she is still in her nightgown.    She is concerned about her bottom number.  Please advise.   "

## 2018-04-10 ENCOUNTER — TELEPHONE (OUTPATIENT)
Dept: UROLOGY | Facility: CLINIC | Age: 68
End: 2018-04-10

## 2018-04-10 DIAGNOSIS — Z93.59 CHRONIC SUPRAPUBIC CATHETER: ICD-10-CM

## 2018-04-10 DIAGNOSIS — N31.9 NEUROGENIC BLADDER: Primary | ICD-10-CM

## 2018-04-18 RX ORDER — METHOCARBAMOL 750 MG/1
TABLET, FILM COATED ORAL
Qty: 180 TABLET | Refills: 2 | Status: SHIPPED | OUTPATIENT
Start: 2018-04-18 | End: 2018-06-25 | Stop reason: SDUPTHER

## 2018-04-20 ENCOUNTER — SURGERY (OUTPATIENT)
Age: 68
End: 2018-04-20

## 2018-04-20 ENCOUNTER — HOSPITAL ENCOUNTER (OUTPATIENT)
Facility: OTHER | Age: 68
Discharge: HOME | End: 2018-04-20
Attending: ANESTHESIOLOGY | Admitting: ANESTHESIOLOGY
Payer: MEDICARE

## 2018-04-20 VITALS
HEART RATE: 93 BPM | HEIGHT: 68 IN | BODY MASS INDEX: 44.41 KG/M2 | RESPIRATION RATE: 18 BRPM | DIASTOLIC BLOOD PRESSURE: 75 MMHG | OXYGEN SATURATION: 93 % | SYSTOLIC BLOOD PRESSURE: 131 MMHG | WEIGHT: 293 LBS

## 2018-04-20 DIAGNOSIS — M47.816 LUMBAR SPONDYLOSIS: Primary | ICD-10-CM

## 2018-04-20 LAB — POCT GLUCOSE: 266 MG/DL (ref 70–110)

## 2018-04-20 PROCEDURE — 82947 ASSAY GLUCOSE BLOOD QUANT: CPT | Performed by: ANESTHESIOLOGY

## 2018-04-20 PROCEDURE — S0020 INJECTION, BUPIVICAINE HYDRO: HCPCS | Performed by: ANESTHESIOLOGY

## 2018-04-20 PROCEDURE — 25000003 PHARM REV CODE 250: Performed by: ANESTHESIOLOGY

## 2018-04-20 PROCEDURE — 63600175 PHARM REV CODE 636 W HCPCS: Performed by: ANESTHESIOLOGY

## 2018-04-20 PROCEDURE — 64636 DESTROY L/S FACET JNT ADDL: CPT | Mod: LT,,, | Performed by: ANESTHESIOLOGY

## 2018-04-20 PROCEDURE — 64635 DESTROY LUMB/SAC FACET JNT: CPT | Performed by: ANESTHESIOLOGY

## 2018-04-20 PROCEDURE — 64635 DESTROY LUMB/SAC FACET JNT: CPT | Mod: LT,,, | Performed by: ANESTHESIOLOGY

## 2018-04-20 PROCEDURE — 64636 DESTROY L/S FACET JNT ADDL: CPT | Performed by: ANESTHESIOLOGY

## 2018-04-20 PROCEDURE — 99152 MOD SED SAME PHYS/QHP 5/>YRS: CPT | Mod: ,,, | Performed by: ANESTHESIOLOGY

## 2018-04-20 RX ORDER — SODIUM CHLORIDE 9 MG/ML
INJECTION, SOLUTION INTRAVENOUS CONTINUOUS
Status: DISCONTINUED | OUTPATIENT
Start: 2018-04-20 | End: 2018-04-20 | Stop reason: HOSPADM

## 2018-04-20 RX ORDER — BUPIVACAINE HYDROCHLORIDE 5 MG/ML
INJECTION, SOLUTION EPIDURAL; INTRACAUDAL
Status: DISCONTINUED | OUTPATIENT
Start: 2018-04-20 | End: 2018-04-20 | Stop reason: HOSPADM

## 2018-04-20 RX ORDER — FENTANYL CITRATE 50 UG/ML
INJECTION, SOLUTION INTRAMUSCULAR; INTRAVENOUS
Status: DISCONTINUED | OUTPATIENT
Start: 2018-04-20 | End: 2018-04-20 | Stop reason: HOSPADM

## 2018-04-20 RX ORDER — LIDOCAINE HYDROCHLORIDE 20 MG/ML
INJECTION, SOLUTION INFILTRATION; PERINEURAL
Status: DISCONTINUED | OUTPATIENT
Start: 2018-04-20 | End: 2018-04-20 | Stop reason: HOSPADM

## 2018-04-20 RX ORDER — MIDAZOLAM HYDROCHLORIDE 1 MG/ML
INJECTION INTRAMUSCULAR; INTRAVENOUS
Status: DISCONTINUED | OUTPATIENT
Start: 2018-04-20 | End: 2018-04-20 | Stop reason: HOSPADM

## 2018-04-20 RX ORDER — DEXAMETHASONE SODIUM PHOSPHATE 4 MG/ML
INJECTION, SOLUTION INTRA-ARTICULAR; INTRALESIONAL; INTRAMUSCULAR; INTRAVENOUS; SOFT TISSUE
Status: DISCONTINUED | OUTPATIENT
Start: 2018-04-20 | End: 2018-04-20 | Stop reason: HOSPADM

## 2018-04-20 RX ADMIN — LIDOCAINE HYDROCHLORIDE 10 ML: 20 INJECTION, SOLUTION INFILTRATION; PERINEURAL at 10:04

## 2018-04-20 RX ADMIN — FENTANYL CITRATE 50 MCG: 50 INJECTION, SOLUTION INTRAMUSCULAR; INTRAVENOUS at 10:04

## 2018-04-20 RX ADMIN — SODIUM CHLORIDE: 0.9 INJECTION, SOLUTION INTRAVENOUS at 10:04

## 2018-04-20 RX ADMIN — MIDAZOLAM HYDROCHLORIDE 0.5 MG: 1 INJECTION, SOLUTION INTRAMUSCULAR; INTRAVENOUS at 11:04

## 2018-04-20 RX ADMIN — DEXAMETHASONE SODIUM PHOSPHATE 10 MG: 4 INJECTION, SOLUTION INTRAMUSCULAR; INTRAVENOUS at 10:04

## 2018-04-20 RX ADMIN — BUPIVACAINE HYDROCHLORIDE 10 ML: 5 INJECTION, SOLUTION EPIDURAL; INTRACAUDAL; PERINEURAL at 10:04

## 2018-04-20 RX ADMIN — MIDAZOLAM HYDROCHLORIDE 1.5 MG: 1 INJECTION, SOLUTION INTRAMUSCULAR; INTRAVENOUS at 10:04

## 2018-04-20 RX ADMIN — MIDAZOLAM HYDROCHLORIDE 1 MG: 1 INJECTION, SOLUTION INTRAMUSCULAR; INTRAVENOUS at 11:04

## 2018-04-20 NOTE — OP NOTE
"Date of Procedure: 04/20/2018    Procedure: Left L2-5 Lumbar Medial Branch Nerve Thermal Radiofrequency Ablation    Pre-op diagnosis: Lumbar spondylosis; morbid obesity    Post-op diagnosis: Lumbar spondylosis; morbid obesity     Physician: Dr. April Torres     Assistant: None    Anesthestia: local/IV sedation:  Versed 3 mg and fentanyl 100 mcg IV.  Conscious sedation provided by MD and monitored by RN.  Total sedation time was less than 45 minutes. (See nurse documentation and case log for sedation time)    EBL: None    Specimens: None    All medications, allergies, and relevant histories were reviewed. No recent antibiotics or infections.  A time-out was taken to verify the correct patient, procedure, laterality, and appropriate medications/allergies.    Procedure: Lumbar RFA    Lumbar Medial Branch Block with radiofrequency ablation, levels L2-5     The procedure risks, benefits, and possible complications were discussed with the patient including nerve damage, infection, spinal headache, and paresis.   Patient was placed in the prone position with the midriff elevated. Skin was prepped with CHG and draped. Oblique view of the spine was obtained with fluoroscopy. Entry sites were marked over the skin and Xylocaine 1% was used to anesthetize the skin and subcutaneous tissues.     A 3.5 " Nehal Venom needle was introduced at an angle to parallel the medial branches in the groove between superior articular process and transverse process, and L5 primary dorsal ramus at the junction of the S1 superior articular process and sacral ala.    Multifidus stim elicited at each level.  No distal motor stimulation was elicited at any level at 2V with a frequency of 2Hz.  All impedances were within the acceptable range.    1cc of 2% lidocaine was injected at each level.  Thermal RF was then conducted at each level at 80 degrees, for 2:30 minutes   1 cc of a mixture of 0.5% bupivacaine with dexamethasone 5 mg was injected at " each level.    Special equipment and extra time required due to obesity.    Patient tolerated the procedure well and there were no complications.      Future Management:   If helpful, can repeat as needed.  Follow up with me in 4-6 weeks.      I certify that I provided the above services.  I was present for the entire procedure, which was performed by myself with the assistance of the resident physician.  There were no parts of the procedure that were performed not by myself or without my direct supervision.

## 2018-04-20 NOTE — DISCHARGE SUMMARY
"Discharge Note  Short Stay      SUMMARY     Admit Date: 4/20/2018    Attending Physician: April Torres      Discharge Physician: Arpil Torres      Discharge Date: 4/20/2018 11:51 AM    Procedure: Left L2-5 Lumbar Medial Branch Nerve Thermal Radiofrequency Ablation    Final Diagnosis: Lumbar spondylosis [M47.816]; morbid obesity    Disposition: Home or self care    Patient Instructions:   Discharge Medication List as of 4/20/2018 11:38 AM      CONTINUE these medications which have NOT CHANGED    Details   aspirin-acetaminophen-caffeine 250-250-65 mg (EXCEDRIN MIGRAINE) 250-250-65 mg per tablet Take 1 tablet by mouth every 6 (six) hours as needed for Pain., Historical Med      BD INSULIN PEN NEEDLE UF SHORT 31 gauge x 5/16" Ndle USE ONCE DAILY WITH LANTUS SOLOSTAR PEN INSULIN, Normal      BD INSULIN SYRINGE ULTRA-FINE 1/2 mL 31 gauge x 15/64" Syrg USE WITH INSULIN 4 TIMES A DAY, Normal      cloNIDine (CATAPRES) 0.1 MG tablet One po q8 hours prn SBP > 170, Normal      cyanocobalamin, vitamin B-12, (VITAMIN B-12) 5,000 mcg Subl Place 1 tablet under the tongue once daily., Until Discontinued, Historical Med      ergocalciferol (VITAMIN D2) 50,000 unit Cap TAKE 1 CAPSULE (50,000 UNITS TOTAL) BY MOUTH EVERY 7 DAYS., Normal      ferrous sulfate 325 (65 FE) MG EC tablet Take 325 mg by mouth once daily. , Until Discontinued, Historical Med      gemfibrozil (LOPID) 600 MG tablet Patient takes one tablet every other day, Normal      hydrocodone-acetaminophen 10-325mg (NORCO)  mg Tab Take 1 tablet by mouth every 6 (six) hours as needed., Starting Thu 5/10/2018, Until Sat 6/9/2018, Print      insulin lispro (HUMALOG) 100 unit/mL injection Inject under the skin 7 units in the AM, 10 units for lunch, 12 units for Dinner , before meals, Normal      lancets (ONE TOUCH DELICA LANCETS) Misc Ultra 2 lancets to check blood sugar BID, Normal      !! LANTUS SOLOSTAR 100 unit/mL (3 mL) InPn pen INJECT 19 UNITS INTO THE SKIN EVERY " EVENING., Normal      !! LANTUS SOLOSTAR U-100 INSULIN 100 unit/mL (3 mL) InPn pen INJECT 19 UNITS INTO THE SKIN EVERY EVENING., Normal      levothyroxine (SYNTHROID) 50 MCG tablet Take 1 tablet (50 mcg total) by mouth once daily., Starting Tue 11/28/2017, Until Wed 11/28/2018, Normal      magnesium oxide (MAG-OX) 400 mg tablet TAKE 1 TABLET (400 MG TOTAL) BY MOUTH 2 (TWO) TIMES DAILY., Normal      methocarbamol (ROBAXIN) 750 MG Tab TAKE 1-2 TABLETS BY MOUTH 4 TIMES DAILY AS NEEDED, Normal      multivitamin with minerals tablet Take 1 tablet by mouth once daily., Until Discontinued, Historical Med      oxybutynin (DITROPAN-XL) 10 MG 24 hr tablet Take 10 mg by mouth once daily., Starting Thu 11/16/2017, Historical Med      pravastatin (PRAVACHOL) 40 MG tablet TAKE 1 TABLET BY MOUTH EVERY DAY, Normal      riboflavin, vitamin B2, 400 mg Tab Take 400 mg by mouth once daily., Starting Wed 8/23/2017, Normal      scopolamine (TRANSDERM-SCOP) 1.3-1.5 mg (1 mg over 3 days) Place 1 patch onto the skin every 72 hours., Starting Wed 9/27/2017, Normal      sodium bicarbonate 650 MG tablet TAKE 1 TABLET (650 MG TOTAL) BY MOUTH 3 (THREE) TIMES DAILY., Normal      !! sumatriptan (IMITREX) 100 MG tablet Take 1 tablet (100 mg total) by mouth 2 (two) times daily as needed for Migraine., Starting Thu 12/28/2017, Normal      !! sumatriptan (IMITREX) 100 MG tablet TAKE 1 TABLET (100 MG TOTAL) BY MOUTH 2 (TWO) TIMES DAILY AS NEEDED FOR MIGRAINE., Starting Thu 2/22/2018, Normal      topiramate (TOPAMAX) 200 MG Tab Take 1 tablet (200 mg total) by mouth 2 (two) times daily., Starting Wed 8/23/2017, Until Thu 8/23/2018, Print      venlafaxine (EFFEXOR-XR) 75 MG 24 hr capsule Take 2 capsules (150 mg total) by mouth once daily., Starting Wed 11/29/2017, Normal       !! - Potential duplicate medications found. Please discuss with provider.          Resume home diet and activity

## 2018-04-20 NOTE — DISCHARGE INSTRUCTIONS

## 2018-05-02 ENCOUNTER — OFFICE VISIT (OUTPATIENT)
Dept: PAIN MEDICINE | Facility: CLINIC | Age: 68
End: 2018-05-02
Attending: ANESTHESIOLOGY
Payer: MEDICARE

## 2018-05-02 ENCOUNTER — ANESTHESIA EVENT (OUTPATIENT)
Dept: SURGERY | Facility: HOSPITAL | Age: 68
End: 2018-05-02
Payer: MEDICARE

## 2018-05-02 VITALS
DIASTOLIC BLOOD PRESSURE: 69 MMHG | HEART RATE: 104 BPM | HEIGHT: 68 IN | SYSTOLIC BLOOD PRESSURE: 127 MMHG | RESPIRATION RATE: 18 BRPM | TEMPERATURE: 98 F

## 2018-05-02 DIAGNOSIS — M79.10 MYALGIA: ICD-10-CM

## 2018-05-02 DIAGNOSIS — G89.29 CHRONIC PAIN OF LEFT KNEE: ICD-10-CM

## 2018-05-02 DIAGNOSIS — M54.6 THORACIC SPINE PAIN: ICD-10-CM

## 2018-05-02 DIAGNOSIS — Z01.818 PREOPERATIVE TESTING: Primary | ICD-10-CM

## 2018-05-02 DIAGNOSIS — G89.4 CHRONIC PAIN DISORDER: Primary | ICD-10-CM

## 2018-05-02 DIAGNOSIS — M47.816 LUMBAR SPONDYLOSIS: ICD-10-CM

## 2018-05-02 DIAGNOSIS — M25.562 CHRONIC PAIN OF LEFT KNEE: ICD-10-CM

## 2018-05-02 DIAGNOSIS — E66.01 MORBID OBESITY WITH BMI OF 45.0-49.9, ADULT: ICD-10-CM

## 2018-05-02 DIAGNOSIS — R53.81 PHYSICAL DECONDITIONING: ICD-10-CM

## 2018-05-02 DIAGNOSIS — M62.830 LUMBAR PARASPINAL MUSCLE SPASM: ICD-10-CM

## 2018-05-02 PROCEDURE — 99214 OFFICE O/P EST MOD 30 MIN: CPT | Mod: S$PBB,GC,, | Performed by: ANESTHESIOLOGY

## 2018-05-02 PROCEDURE — 99213 OFFICE O/P EST LOW 20 MIN: CPT | Mod: PBBFAC | Performed by: ANESTHESIOLOGY

## 2018-05-02 PROCEDURE — 99999 PR PBB SHADOW E&M-EST. PATIENT-LVL III: CPT | Mod: PBBFAC,,, | Performed by: ANESTHESIOLOGY

## 2018-05-02 NOTE — PROGRESS NOTES
Subjective:     Patient ID: Cecile Bowen is a 67 y.o. female.    Chief Complaint: Pain    Consulted by: Dr. Enrique Henao     Disclaimer: This note was generated using voice recognition software.  There may be a typographical errors that were missed during proofreading.      HPI:    Cecile Bowen is a 67 y.o. female who presents today with low back pain. Her back pain has been present for 20+ years.  She has been diagnosed with degenerative disc disease (20 years ago), levoscoliosis (5 years ago), OA of the spine.  Her pain has been worsening over time, worse in the last decade, and severe for the past few years.  The worst of her pain is in her low back. The pain is equal bilaterally and does not radiate.  No associated numbness, tingling, or muscle weakness.  She has noticed that she has lost some muscle tone.  This pain is described in detail below.    She was medically screened by the P and found to not be a current candidate.      She has recently had x-rays of her thoracic and lumbar spine that we will review today.    Interval History (3/12/2018)  Patient returns today s/p bilateral MBB L1,2,3 and left genicular nerve blocks. She reports at least 70% reduction in knee pain, but minimal (15%, at most) reduction in pain in the lower back. Her pain is similar location and distribution as previous, but slightly higher intensity and worsening spasms. Patient denies bowel/bladder incontinence or saddle anesthesia. Currently taking Robaxin 1500 TID and norco 10/325 up to 4 pills per day, both of which are helpful.     Interval History (5/2/2018):  She returns today for follow up.  She reports that the lower RFA, right then left.  Her pain is now higher up.  She does not want to do any more injections at this time.  Her medication regimen is helpful.  Her left knee is tolerable.    Physical Therapy: Yes, at [facility], most recent was 2 visits in 2014.  She did a course for a couple of months before  that    Non-pharmacologic Treatment:     · Ice/Heat: Heat helps  · TENS: Not tried  · Massage: Not tried  · Chiropractic care: Not tried  · Acupuncture: Not tried  · Other: Use a scooter. Has a recliner lift. No other assistive devices         Pain Medications:         · Currently taking: Norco 10/325 mg BID-QID PRN, Topamax, Robaxin 1500 mg TID, Excedrin migraine, Imitrex, topamax, Effexor, Biofreeze    · Has tried in the past:  Flexeril, Baclofen, gabapentin, memantine, amitriptyline, Zoloft, Lidoderm patches    · Has not tried: NSAIDs, Tylenol, Lyrica, Rx topical creams    Blood thinners: None    Interventional Therapies:   · 2/2018: bilateral MBB L1,2,3 (negative 15% pain relief) and left genicular nerve blocks (positive, 70% pain relief)    Relevant Surgeries: None    Affecting sleep? Yes, she is getting about 2-3 hours at a time    Affecting daily activities? Yes    Depressive symptoms? Treated for depression          · SI/HI? No    Work status: Retired , 3 rd grade.  Retired due to pain    Prescription Monitoring Program database:  Reviewed and consistent with medication use as prescribed.    Pain Scales  Best: 3/10  Worst: 9/10  Usually: 6/10  Today: 7/10    Back Pain   This is a chronic problem. The current episode started more than 1 year ago. The problem occurs constantly. The problem has been rapidly worsening since onset. The pain is present in the lumbar spine, sacro-iliac and thoracic spine. The pain does not radiate (having seperate pains in Bilateral Hip and Knees). The pain is at a severity of 7/10. The pain is moderate. The pain is the same all the time. The symptoms are aggravated by standing, bending and coughing (walking, lifting and getting up from a sitting position). Associated symptoms include headaches. Pertinent negatives include no chest pain, fever or weight loss. She has tried NSAIDs, muscle relaxant, injection treatment and heat (injection treatments in knees) for the  symptoms. Physical therapy was effective and ineffective.      Last 3 PDI Scores 5/2/2018 3/12/2018 2/8/2018   Pain Disability Index (PDI) 44 [phone]       Review of Systems   Constitution: Negative for chills, fever, malaise/fatigue, weight gain and weight loss.   HENT: Negative for ear pain and hoarse voice.    Eyes: Negative for blurred vision, pain and visual disturbance.   Cardiovascular: Negative for chest pain, dyspnea on exertion and irregular heartbeat.   Respiratory: Negative for cough, shortness of breath and wheezing.    Endocrine: Negative for cold intolerance and heat intolerance.   Hematologic/Lymphatic: Negative for adenopathy and bleeding problem. Does not bruise/bleed easily.   Skin: Negative for color change, itching and rash.   Musculoskeletal: Positive for back pain, joint pain, muscle cramps and stiffness.   Gastrointestinal: Negative for change in bowel habit, diarrhea, hematemesis, hematochezia, melena and vomiting.   Genitourinary: Negative for flank pain, frequency, hematuria and urgency.   Neurological: Positive for headaches and loss of balance. Negative for difficulty with concentration, dizziness and seizures.   Psychiatric/Behavioral: Negative for altered mental status, depression and suicidal ideas. The patient is not nervous/anxious.    Allergic/Immunologic: Negative for HIV exposure.             Past Medical History:   Diagnosis Date    Back pain     CKD (chronic kidney disease) stage 3, GFR 30-59 ml/min     as per patient    Diabetes type 2, uncontrolled 12/12/2012    DJD (degenerative joint disease) 12/12/2012    Hypertension 12/12/2012    Hypothyroidism 12/12/2012    Levoscoliosis     Migraine     Nuclear sclerosis - Both Eyes 3/24/2014    Obesity 12/12/2012       Past Surgical History:   Procedure Laterality Date    BREAST BIOPSY Right     benign    CHOLECYSTECTOMY      COLONOSCOPY N/A 1/13/2017    Procedure: COLONOSCOPY;  Surgeon: Morris Wiseman MD;  [facility]:  [facility]  Service: Endoscopy;  Laterality: N/A;  Renal pt Nephrology advised to avoid phosphate preps    DILATION AND CURETTAGE OF UTERUS      GALLBLADDER SURGERY  2006    OVARIAN CYST SURGERY  1985    spt placement      TONSILLECTOMY, ADENOIDECTOMY      TOTAL ABDOMINAL HYSTERECTOMY W/ BILATERAL SALPINGOOPHORECTOMY  1985       Review of patient's allergies indicates:  No Known Allergies    Current Outpatient Prescriptions   Medication Sig Dispense Refill    aspirin-acetaminophen-caffeine 250-250-65 mg (EXCEDRIN MIGRAINE) 250-250-65 mg per tablet Take 1 tablet by mouth every 6 (six) hours as needed for Pain.      BD INSULIN PEN NEEDLE UF SHORT 31 gauge x 5/16" Ndle USE ONCE DAILY WITH LANTUS SOLOSTAR PEN INSULIN 150 each 3    BD INSULIN SYRINGE ULTRA-FINE 1/2 mL 31 gauge x 15/64" Syrg USE WITH INSULIN 4 TIMES A  Syringe 4    cloNIDine (CATAPRES) 0.1 MG tablet One po q8 hours prn SBP > 170 90 tablet 3    cyanocobalamin, vitamin B-12, (VITAMIN B-12) 5,000 mcg Subl Place 1 tablet under the tongue once daily.      ergocalciferol (VITAMIN D2) 50,000 unit Cap TAKE 1 CAPSULE (50,000 UNITS TOTAL) BY MOUTH EVERY 7 DAYS. 4 capsule 4    ferrous sulfate 325 (65 FE) MG EC tablet Take 325 mg by mouth once daily.       gemfibrozil (LOPID) 600 MG tablet Patient takes one tablet every other day 60 tablet 3    [START ON 5/10/2018] hydrocodone-acetaminophen 10-325mg (NORCO)  mg Tab Take 1 tablet by mouth every 6 (six) hours as needed. 120 tablet 0    insulin lispro (HUMALOG) 100 unit/mL injection Inject under the skin 7 units in the AM, 10 units for lunch, 12 units for Dinner , before meals 10 mL 11    lancets (ONE TOUCH DELICA LANCETS) Misc Ultra 2 lancets to check blood sugar  each 6    LANTUS SOLOSTAR 100 unit/mL (3 mL) InPn pen INJECT 19 UNITS INTO THE SKIN EVERY EVENING. 15 Syringe 3    LANTUS SOLOSTAR U-100 INSULIN 100 unit/mL (3 mL) InPn pen INJECT 19 UNITS INTO THE SKIN EVERY " EVENING. 15 Syringe 3    levothyroxine (SYNTHROID) 50 MCG tablet Take 1 tablet (50 mcg total) by mouth once daily. 30 tablet 11    magnesium oxide (MAG-OX) 400 mg tablet TAKE 1 TABLET (400 MG TOTAL) BY MOUTH 2 (TWO) TIMES DAILY. 180 tablet 3    methocarbamol (ROBAXIN) 750 MG Tab TAKE 1-2 TABLETS BY MOUTH 4 TIMES DAILY AS NEEDED 180 tablet 2    multivitamin with minerals tablet Take 1 tablet by mouth once daily.      oxybutynin (DITROPAN-XL) 10 MG 24 hr tablet Take 10 mg by mouth once daily.  9    pravastatin (PRAVACHOL) 40 MG tablet TAKE 1 TABLET BY MOUTH EVERY DAY 30 tablet 5    riboflavin, vitamin B2, 400 mg Tab Take 400 mg by mouth once daily. 90 tablet 3    scopolamine (TRANSDERM-SCOP) 1.3-1.5 mg (1 mg over 3 days) Place 1 patch onto the skin every 72 hours. 4 patch 0    sodium bicarbonate 650 MG tablet TAKE 1 TABLET (650 MG TOTAL) BY MOUTH 3 (THREE) TIMES DAILY. 270 tablet 3    sumatriptan (IMITREX) 100 MG tablet Take 1 tablet (100 mg total) by mouth 2 (two) times daily as needed for Migraine. 9 tablet 11    sumatriptan (IMITREX) 100 MG tablet TAKE 1 TABLET (100 MG TOTAL) BY MOUTH 2 (TWO) TIMES DAILY AS NEEDED FOR MIGRAINE. 9 tablet 6    topiramate (TOPAMAX) 200 MG Tab Take 1 tablet (200 mg total) by mouth 2 (two) times daily. 180 tablet 3    venlafaxine (EFFEXOR-XR) 75 MG 24 hr capsule Take 2 capsules (150 mg total) by mouth once daily. 180 capsule 1     No current facility-administered medications for this visit.        Family History   Problem Relation Age of Onset    Diabetes Sister     Kidney disease Sister         CKD III    ALS Mother         d.    Cancer Maternal Grandmother         d. colon    Cancer Paternal Grandfather         d. lung    Scoliosis Brother         increased pain    Prostate cancer Brother     Diabetes Maternal Aunt     Kidney disease Maternal Aunt     Diabetes Maternal Uncle     Amblyopia Neg Hx     Blindness Neg Hx     Cataracts Neg Hx     Glaucoma Neg Hx   "   Macular degeneration Neg Hx     Retinal detachment Neg Hx     Strabismus Neg Hx        Social History     Social History    Marital status:      Spouse name: N/A    Number of children: N/A    Years of education: N/A     Occupational History    Not on file.     Social History Main Topics    Smoking status: Never Smoker    Smokeless tobacco: Never Used    Alcohol use No    Drug use: No    Sexual activity: Not Currently     Birth control/ protection: Abstinence     Other Topics Concern    Not on file     Social History Narrative    Single        Lives w/ sister  And brother     both are helping her during her post hosp recovery period       Objective:     Vitals:    05/02/18 1006   BP: 127/69   Pulse: 104   Resp: 18   Temp: 98.1 °F (36.7 °C)   Height: 5' 8" (1.727 m)   PainSc:   7       GEN:  Well developed, well nourished.  No acute distress.   HEENT:  No trauma.  Mucous membranes moist.  Nares patent bilaterally.  PSYCH: Normal affect. Thought content appropriate.  CHEST:  Breathing symmetric.  No audible wheezing.  ABD: Soft, non-distended.  SKIN:  Warm, pink, dry.  No rash on exposed areas.    EXT:  No cyanosis, clubbing, or edema.  No color change or changes in nail or hair growth.  NEURO/MUSCULOSKELETAL:  Fully alert, oriented, and appropriate. Speech normal sen. No cranial nerve deficits.   Gait: Antalgic, cannot ambulate without assistance.  Present trendelenburg sign bilaterally.   Motor Strength: 5/5 motor strength throughout lower extremities.   Sensory: No sensory deficit in the lower extremities.   L-Spine:  Markedly decreased ROM with pain on extension, lateral rotation. Positive facet loading bilaterally in upper lumbar spine.    Diffuse TTP over lumbar paraspinals > bilateral SI joints        Imaging:        The imaging studies listed below were independently reviewed by me, and I agree with the findings as documented below.     Narrative     Two views of the lumbar spine " submitted.      Moderate degenerative change seen in the lumbar spine with multilevel interspace narrowing and vacuum disks.  Vertebral body heights are fairly well maintained.  Mild levoscoliosis seen.  Lateral alignment is reasonably good.   Impression      As above.      Electronically signed by: Real Rasmussen MD  Date: 12/05/14  Time: 10:47     Narrative     5 views: There is moderate DJD at L2-L3, mild elsewhere. Alignment is normal. No fracture dislocation bone destruction or pars defect seen.   Impression      DJD.      Electronically signed by: ANANT LICONA MD  Date: 01/31/18  Time: 12:30      Narrative     2 views of the thoracic spine were obtained, with AP and lateral projections submitted.  Comparison is made to a thoracic CT examination of 2/9/17.    No significant alignment abnormality.  Vertebral body heights are adequately maintained, without significant compression deformity at any level.  Note is made of disc narrowing and vacuum phenomena indicative of disc desiccation at both T7-8 and T8-9, with minimal marginal vertebral endplate spurring at these levels, findings which can also be appreciated on the thoracic CT examination of 2/9/17.  Vertebral endplates are well-defined.  No osseous destructive process or paraspinal soft tissue mass.  Right upper quadrant surgical clips are incidentally observed.   Impression      Disc narrowing, minimal marginal vertebral endplate spurring, and vacuum phenomena indicative of disc desiccation are again seen at both the T7-8 and T8-9 levels.  No evidence of compression fracture or other detrimental change since the CT exam of 2/9/17 is appreciated.      Electronically signed by: Nithin Valadez MD  Date: 01/31/18  Time: 12:37          Assessment:     Encounter Diagnoses   Name Primary?    Chronic pain disorder Yes    Lumbar spondylosis     Lumbar paraspinal muscle spasm     Myalgia     Thoracic spine pain     Chronic pain of left knee     Morbid obesity with  BMI of 45.0-49.9, adult     Physical deconditioning        Plan:     Diagnoses and all orders for this visit:    Chronic pain disorder  -     Ambulatory consult to Physical Therapy    Lumbar spondylosis  -     Ambulatory consult to Physical Therapy    Lumbar paraspinal muscle spasm  -     Ambulatory consult to Physical Therapy    Myalgia  -     Ambulatory consult to Physical Therapy    Thoracic spine pain  -     Ambulatory consult to Physical Therapy    Chronic pain of left knee  -     Ambulatory consult to Physical Therapy    Morbid obesity with BMI of 45.0-49.9, adult  -     Ambulatory consult to Physical Therapy    Physical deconditioning  -     Ambulatory consult to Physical Therapy       Her pain is consistent with the above.    We discussed the assessment and recommendations.  All available images were reviewed. We discussed the disease process, prognosis, treatment plan, and risks and benefits. The patient is aware of the risks and benefits of the medications being prescribed, common side effects, and proper usage. The following is the plan we agreed on:     1. Can repeat L2,L3,L4,L5 RFA PRN  2. Left knee genicular nerve blocks with good response, but patient does not want knee RFA at this time.   3. Referral to PT. Again, she will likely need extensive PT.  If she is able to build up her endurance and stamina, she would absolutely be a candidate for FRP in the future if needed.  4. [facility]  5. Patient also asked about Quell Machine. This was discussed, and it does not appear this would be covered by insurance.   6. RTC in 3-6 weeks or sooner if needed.    The above plan and management options were discussed at length with patient. Patient is in agreement with the above and verbalized understanding. It will be communicated with the referring physician via electronic record, fax, or mail.

## 2018-05-02 NOTE — PRE ADMISSION SCREENING
Anesthesia Assessment: Preoperative EQUATION    Planned Procedure: Procedure(s) (LRB):  CYSTOSCOPY (N/A)  INJECTION-BOTOX (N/A)  INSERTION-CATHETER-SUPRAPUBIC CHANGE (N/A)  Requested Anesthesia Type:Monitor Anesthesia Care  Surgeon: Ronel Whittington MD  Service: Urology  Known or anticipated Date of Surgery:5/14/2018    Surgeon notes: reviewed    Electronic QUestionnaire Assessment completed via nurse interview with patient.        NO AQ    Triage considerations:     The patient has no apparent active cardiac condition (No unstable coronary Syndrome such as severe unstable angina or recent [<1 month] myocardial infarction, decompensated CHF, severe valvular   disease or significant arrhythmia)    Previous anesthesia records:GETA, MAC and No problems 1/2017 Airway/Jaw/Neck:  Airway Findings: Mallampati: III Improves to II with phonation.  TM Distance: Normal, at least 6 cm  Jaw/Neck Findings:  Neck ROM: Normal ROM       Last PCP note: 6-12 months ago , within [facility]   Subspecialty notes: Endocrinology, Nephrology, Rheumatology, Pain Management    Other important co-morbidities: HTN/HLP, DM2, Hypothyroidism, VIJAYA, HX anemia, morbid obesity, CKD 4, GERD     Tests already available:  Available tests,  3-6 months ago , within [facility] . 2/6/18 renal function. 9/2017 CBC, CMP and A1c. 9/2014 EKG.            Instructions given. (See in Nurse's note)    Optimization:  Anesthesia Preop Clinic Assessment  Indicated-not required for this procedure    Medical Opinion Indicated-will discuss with Dr Leopold          Plan:    Testing:  BMP and EKG (will ask Dr Leopold if A1c requested)     Patient  has previously scheduled Medical Appointment: 5/3 [facility]    Navigation: Tests Scheduled. 5/3                     Results will be tracked by Preop Clinic.

## 2018-05-02 NOTE — ANESTHESIA PREPROCEDURE EVALUATION
Pre Admission Screening  Louisa Sevilla RN      []Hide copied text  Anesthesia Assessment: Preoperative EQUATION     Planned Procedure: Procedure(s) (LRB):  CYSTOSCOPY (N/A)  INJECTION-BOTOX (N/A)  INSERTION-CATHETER-SUPRAPUBIC CHANGE (N/A)  Requested Anesthesia Type:Monitor Anesthesia Care  Surgeon: Ronel Whittington MD  Service: Urology  Known or anticipated Date of Surgery:5/14/2018     Surgeon notes: reviewed     Electronic QUestionnaire Assessment completed via nurse interview with patient.         NO AQ     Triage considerations:      The patient has no apparent active cardiac condition (No unstable coronary Syndrome such as severe unstable angina or recent [<1 month] myocardial infarction, decompensated CHF, severe valvular   disease or significant arrhythmia)     Previous anesthesia records:GETA, MAC and No problems 1/2017 Airway/Jaw/Neck:  Airway Findings: Mallampati: III Improves to II with phonation.  TM Distance: Normal, at least 6 cm  Jaw/Neck Findings:  Neck ROM: Normal ROM        Last PCP note: 6-12 months ago , within [facility]   Subspecialty notes: Endocrinology, Nephrology, Rheumatology, Pain Management     Other important co-morbidities: HTN/HLP, DM2, Hypothyroidism, VIJAYA, HX anemia, morbid obesity, CKD 4, GERD     Tests already available:  Available tests,  3-6 months ago , within [facility] . 2/6/18 renal function. 9/2017 CBC, CMP and A1c. 9/2014 EKG.                            Instructions given. (See in Nurse's note)     Optimization:  Anesthesia Preop Clinic Assessment  Indicated-not required for this procedure    Medical Opinion Indicated-will discuss with Dr Leopold                                        Plan:    Testing:  BMP and EKG (will ask Dr Leopold if A1c requested)                           Patient  has previously scheduled Medical Appointment: 5/3 [facility]     Navigation: Tests Scheduled. 5/3                                    Results will be tracked by Preop Clinic.                             Electronically signed by Louisa Sevilla RN at 5/2/2018  2:36 PM        Pre-admit on 5/14/2018            Detailed Report             5/4-lab and EKG results noted    Cecile Bowen is a 66yo F with h/o incomplete bladder emptying with suprapubic cath, here for bladder botox and cath exchange.       Past Medical History:   Diagnosis Date    Back pain     CKD (chronic kidney disease) stage 3, GFR 30-59 ml/min     as per patient    Diabetes type 2, uncontrolled 12/12/2012    DJD (degenerative joint disease) 12/12/2012    Hypertension 12/12/2012    Hypothyroidism 12/12/2012    Levoscoliosis     Migraine     Nuclear sclerosis - Both Eyes 3/24/2014    Obesity 12/12/2012       Past Surgical History:   Procedure Laterality Date    BREAST BIOPSY Right     benign    CHOLECYSTECTOMY      COLONOSCOPY N/A 1/13/2017    Procedure: COLONOSCOPY;  Surgeon: Morris Wiseman MD;  Location: [facility] ([address] [place] [address]);  Service: Endoscopy;  Laterality: N/A;  Renal pt Nephrology advised to avoid phosphate preps    DILATION AND CURETTAGE OF UTERUS      GALLBLADDER SURGERY  2006    OVARIAN CYST SURGERY  1985    spt placement      TONSILLECTOMY, ADENOIDECTOMY      TOTAL ABDOMINAL HYSTERECTOMY W/ BILATERAL SALPINGOOPHORECTOMY  1985                                                                                                             Anesthesia Evaluation         Review of Systems  Anesthesia Hx:  No problems with previous Anesthesia Denies Hx of Anesthetic complications HX of TMJ. History of prior surgery of interest to airway management or planning: Previous anesthesia: MAC 1/2017 colonoscopy with MAC.  Procedure performed at an [facility] Facility. Denies Family Hx of Anesthesia complications.   Denies Personal Hx of Anesthesia complications.   Hematology/Oncology:     Oncology Normal    -- Anemia: Iron Deficiency Anemia (last H/H 38.6 & 12.7 9/2017, pt is on daily po iron)   EENT/Dental:   Jaw  Problems:  Clicking (TMJ) and Stiffness Denies limited mouth opening  Cardiovascular:   Exercise tolerance: poor Hypertension  Denies Angina. hyperlipidemia ECG has been reviewed. Exercise tolerance limited by back pain and knee pain  Denies CP/SOB Functional Capacity 2 METS  Hypertension , Well Controlled on Rx    Pulmonary:   Denies COPD.  Denies Asthma.  Denies Shortness of breath.  Denies Recent URI. Sleep Apnea  Obstructive Sleep Apnea (VIJAYA), CPAP prescribed.   Renal/:   Chronic Renal Disease, CRI  Devices/Procedures/Surgery, suprapubic catheter. Kidney Function/Disease, Chronic Kidney Disease (CKD) , CKD Stage IV (GFR 15-29)  Other Renal / Gu Conditions: (neurogenic bladder)   Hepatic/GI:   GERD, well controlled    Musculoskeletal:   Arthritis   Musculoskeletal General/Symptoms: low back pain. Uses scooter for long distances. Spine Disorders: lumbar Degenerative Joint Disease    Neurological:   Denies CVA. Headaches  Dx of Headaches, Migraine Headache Pain Etiology/Diagnosis, Arthritis, Fibromayalgia, Low Back Pain    Endocrine:   Diabetes, using insulin  Diabetes, Type 2 Diabetes , controlled by insulin. Typical AM glucose range: 200-300 , most recent HgA1c value was 7.5 on 9/2017.  Thyroid Disease Hypothyroidism  Parathyroid Disease, Hyperparathyroidism  Metabolic Disorders, Morbid Obesity / BMI > 40  Psych:   depression          Physical Exam  General:  Morbid Obesity    Airway/Jaw/Neck:  Airway Findings: Mouth Opening: Normal Tongue: Normal  General Airway Assessment: Adult  Mallampati: II  TM Distance: Normal, at least 6 cm      Dental:  Dental Findings: Edentulous   Chest/Lungs:  Chest/Lungs Findings: Normal Respiratory Rate     Heart/Vascular:  Heart Findings: Rate: Normal        Mental Status:  Mental Status Findings:  Alert and Oriented         Anesthesia Plan  Type of Anesthesia, risks & benefits discussed:  Anesthesia Type:  general, MAC  Patient's Preference:   Intra-op Monitoring Plan: standard  ASA monitors  Intra-op Monitoring Plan Comments:   Post Op Pain Control Plan: multimodal analgesia, IV/PO Opioids PRN and per primary service following discharge from PACU  Post Op Pain Control Plan Comments:   Induction:   IV  Beta Blocker:  Patient is not currently on a Beta-Blocker (No further documentation required).       Informed Consent: Patient understands risks and agrees with Anesthesia plan.  Questions answered. Anesthesia consent signed with patient.  ASA Score: 3     Day of Surgery Review of History & Physical:    H&P update referred to the surgeon.         Ready For Surgery From Anesthesia Perspective.

## 2018-05-03 ENCOUNTER — OFFICE VISIT (OUTPATIENT)
Dept: UROLOGY | Facility: CLINIC | Age: 68
End: 2018-05-03
Payer: MEDICARE

## 2018-05-03 ENCOUNTER — HOSPITAL ENCOUNTER (OUTPATIENT)
Dept: CARDIOLOGY | Facility: CLINIC | Age: 68
Discharge: HOME | End: 2018-05-03
Attending: ANESTHESIOLOGY
Payer: MEDICARE

## 2018-05-03 VITALS
HEART RATE: 102 BPM | SYSTOLIC BLOOD PRESSURE: 130 MMHG | HEIGHT: 68 IN | WEIGHT: 290.13 LBS | BODY MASS INDEX: 43.97 KG/M2 | DIASTOLIC BLOOD PRESSURE: 71 MMHG

## 2018-05-03 DIAGNOSIS — Z43.5 ENCOUNTER FOR CARE OR REPLACEMENT OF SUPRAPUBIC TUBE: ICD-10-CM

## 2018-05-03 DIAGNOSIS — Z93.59 CHRONIC SUPRAPUBIC CATHETER: Primary | ICD-10-CM

## 2018-05-03 DIAGNOSIS — Z43.5 ENCOUNTER FOR SUPRAPUBIC CATHETER CARE: ICD-10-CM

## 2018-05-03 DIAGNOSIS — R82.71 BACTERIA IN URINE: ICD-10-CM

## 2018-05-03 DIAGNOSIS — Z01.818 PREOPERATIVE TESTING: ICD-10-CM

## 2018-05-03 DIAGNOSIS — L92.9 GRANULATION TISSUE: ICD-10-CM

## 2018-05-03 DIAGNOSIS — N32.89 BLADDER SPASM: ICD-10-CM

## 2018-05-03 DIAGNOSIS — N31.9 NEUROGENIC BLADDER: ICD-10-CM

## 2018-05-03 PROCEDURE — 99215 OFFICE O/P EST HI 40 MIN: CPT | Mod: PBBFAC,25 | Performed by: NURSE PRACTITIONER

## 2018-05-03 PROCEDURE — 17250 CHEM CAUT OF GRANLTJ TISSUE: CPT | Mod: PBBFAC | Performed by: NURSE PRACTITIONER

## 2018-05-03 PROCEDURE — 51705 CHANGE OF BLADDER TUBE: CPT | Mod: PBBFAC | Performed by: NURSE PRACTITIONER

## 2018-05-03 PROCEDURE — 93010 ELECTROCARDIOGRAM REPORT: CPT | Mod: S$PBB,,, | Performed by: INTERNAL MEDICINE

## 2018-05-03 PROCEDURE — 87086 URINE CULTURE/COLONY COUNT: CPT

## 2018-05-03 PROCEDURE — 87077 CULTURE AEROBIC IDENTIFY: CPT

## 2018-05-03 PROCEDURE — 17250 CHEM CAUT OF GRANLTJ TISSUE: CPT | Mod: S$PBB,51,, | Performed by: NURSE PRACTITIONER

## 2018-05-03 PROCEDURE — 99214 OFFICE O/P EST MOD 30 MIN: CPT | Mod: S$PBB,25,, | Performed by: NURSE PRACTITIONER

## 2018-05-03 PROCEDURE — 87186 SC STD MICRODIL/AGAR DIL: CPT

## 2018-05-03 PROCEDURE — 81001 URINALYSIS AUTO W/SCOPE: CPT

## 2018-05-03 PROCEDURE — 93005 ELECTROCARDIOGRAM TRACING: CPT | Mod: PBBFAC | Performed by: INTERNAL MEDICINE

## 2018-05-03 PROCEDURE — 51705 CHANGE OF BLADDER TUBE: CPT | Mod: S$PBB,,, | Performed by: NURSE PRACTITIONER

## 2018-05-03 PROCEDURE — 87088 URINE BACTERIA CULTURE: CPT

## 2018-05-03 PROCEDURE — 99999 PR PBB SHADOW E&M-EST. PATIENT-LVL V: CPT | Mod: PBBFAC,,, | Performed by: NURSE PRACTITIONER

## 2018-05-03 NOTE — PROGRESS NOTES
Subjective:       Patient ID: Cecile Bowen is a 67 y.o. female.    Chief Complaint: Catheterization    Cecile Bowen is a 67 y.o. Female with history of bilateral hydronephrosis, incomplete bladder emptying which is managed by SPT (16fr).  Her last SPT change was 04/05/2018.     Here today for scheduled SPT change.   And collect urine for upcoming Botox with Dr. Whittington 05/14/2018     SPT is draining well.  No fever, n/v        Last visit with Dr. Whittington was 11/10/2015.    12/10/2015:  Procedure(s) Performed:   1. Cystoscopy with bladder botox injection  2. Silver nitrate to suprapubic catheter site  3 . Change suprapubic catheter tube            Past Medical History:    Diabetes type 2, uncontrolled                   12/12/2012    Obesity                                         12/12/2012    Hypertension                                    12/12/2012    DJD (degenerative joint disease)                12/12/2012    Hypothyroidism                                  12/12/2012    Nuclear sclerosis - Both Eyes                   3/24/2014     Migraine                                                      Past Surgical History:    TOTAL ABDOMINAL HYSTERECTOMY W/ BILATERAL SALP*  1985          GALLBLADDER SURGERY                              2006          TONSILLECTOMY, ADENOIDECTOMY                                   OVARIAN CYST SURGERY                             1985          HYSTERECTOMY                                                   DILATION AND CURETTAGE OF UTERUS                               Review of patient's family history indicates:    Diabetes                       Sister                    Kidney disease                 Sister                    ALS                            Mother                      Comment: d.    Cancer                         Maternal Grandmother        Comment: d. colon    Cancer                         Paternal Grandfather        Comment: d. lung    Diabetes                 "       Maternal Aunt             Kidney disease                 Maternal Aunt             Diabetes                       Maternal Uncle            Amblyopia                      Neg Hx                    Blindness                      Neg Hx                    Cataracts                      Neg Hx                    Glaucoma                       Neg Hx                    Macular degeneration           Neg Hx                    Retinal detachment             Neg Hx                    Strabismus                     Neg Hx                      Social History    Marital Status:             Spouse Name:                       Years of Education:                 Number of children:               Occupational History    None on file    Social History Main Topics    Smoking Status: Never Smoker                      Smokeless Status: Never Used                        Alcohol Use: No              Drug Use: No              Sexual Activity: Not Currently           Birth Control/Protection: Abstinence    Other Topics            Concern    None on file    Social History Narrative    Single      Lives w/ sister  And brother     both are helping her during her post hosp recovery period        Allergies:  Review of patient's allergies indicates no known allergies.    Medications:  Current outpatient prescriptions:amitriptyline (ELAVIL) 10 MG tablet, TAKE 3 TABLETS BY MOUTH IN THE EVENING, Disp: 90 tablet, Rfl: 5;  aspirin (ECOTRIN) 81 MG EC tablet, Take 81 mg by mouth once daily., Disp: , Rfl: ;  BD INSULIN PEN NEEDLE UF SHORT 31 X 5/16 " Ndle, USE ONCE DAILY WITH LANTUS SOLOSTAR PEN INSULIN, Disp: 100 each, Rfl: 11  butalbital-acetaminophen-caffeine -40 mg (FIORICET, ESGIC) -40 mg per tablet, TAKE 1 TABLET EVERY 4 TO 6 HOURS, Disp: 30 tablet, Rfl: 0;  cyanocobalamin, vitamin B-12, (VITAMIN B-12) 2,500 mcg Subl, Place 2,500 mcg under the tongue once daily., Disp: , Rfl: ;  diphenhydrAMINE (BENADRYL) 25 mg " "capsule, Take 25 mg by mouth every 6 (six) hours as needed., Disp: , Rfl:   ferrous sulfate 325 (65 FE) MG EC tablet, Take 325 mg by mouth 3 (three) times daily with meals., Disp: , Rfl: ;  fluticasone (FLONASE) 50 mcg/actuation nasal spray, 1 SPRAY IN EACH NOSTRIL DAILY (Patient taking differently: 1 SPRAY IN EACH NOSTRIL DAILY PRN), Disp: 16 g, Rfl: 1;  furosemide (LASIX) 20 MG tablet, Take 1 tablet (20 mg total) by mouth once daily., Disp: 4 tablet, Rfl: 0  gemfibrozil (LOPID) 600 MG tablet, TAKE 1 TABLET BY MOUTH TWICE A DAY, Disp: 60 tablet, Rfl: 5;  (START ON 4/29/2015) hydrocodone-acetaminophen 10-325mg (NORCO)  mg Tab, Take 1 tablet by mouth every 6 (six) hours as needed., Disp: 120 tablet, Rfl: 0;  insulin lispro (HUMALOG) 100 unit/mL injection, Inject 7 Units into the skin 3 (three) times daily before meals., Disp: 6.3 mL, Rfl: 11  insulin syringe-needle U-100 (BD INSULIN SYRINGE ULTRA-FINE) 1/2 mL 31 x 15/64" Syrg, 1 Box by Misc.(Non-Drug; Combo Route) route 4 (four) times daily., Disp: 100 Syringe, Rfl: 12;  lancets (ONE TOUCH DELICA LANCETS) Misc, Ultra 2 lancets to check blood sugar BID, Disp: 100 each, Rfl: 6;  LANTUS SOLOSTAR 100 unit/mL (3 mL) InPn pen, , Disp: , Rfl: 3  LIDODERM 5 %(700 mg/patch), APPLY 1 PATCH TO SKIN DAILY (LEAVING ON FOR 12 HOURS AND OFF FOR 12 HOURS), Disp: 30 patch, Rfl: 6;  magnesium oxide (MAG-OX) 400 mg tablet, TAKE 1 TABLET (400 MG TOTAL) BY MOUTH 2 (TWO) TIMES DAILY., Disp: 60 tablet, Rfl: 11;  methocarbamol (ROBAXIN) 750 MG Tab, TAKE 1 TO 2 TABLETS BY MOUTH 3 TIMES A DAY (Patient taking differently: Take 2 tablets twice daily), Disp: 120 tablet, Rfl: 3  methocarbamol (ROBAXIN) 750 MG Tab, TAKE 1 TO 2 TABLETS BY MOUTH 3 TIMES A DAY, Disp: 120 tablet, Rfl: 3;  methylPREDNISolone (MEDROL DOSEPACK) 4 mg tablet, use as directed, Disp: 21 tablet, Rfl: 0;  multivitamin with minerals tablet, Take 1 tablet by mouth once daily., Disp: , Rfl: ;  pravastatin (PRAVACHOL) 40 MG " tablet, TAKE 1 TABLET BY MOUTH EVERY DAY, Disp: 30 tablet, Rfl: 2  pyridoxine (B-6) 100 MG Tab, Take 1 tablet (100 mg total) by mouth once daily., Disp: 30 tablet, Rfl: 12;  scopolamine (TRANSDERM-SCOP) 1.5 mg, Place 1 patch (1.5 mg total) onto the skin every 72 hours., Disp: 4 patch, Rfl: 0;  sulfamethoxazole-trimethoprim 800-160mg (BACTRIM DS) 800-160 mg Tab, Take 1 tablet by mouth 2 (two) times daily., Disp: , Rfl: 0  sumatriptan (IMITREX) 100 MG tablet, TAKE 1 TABLET (100 MG TOTAL) BY MOUTH ONCE., Disp: 9 tablet, Rfl: 6;  SYNTHROID 125 mcg tablet, TAKE 1 TABLET BY MOUTH DAILY, Disp: 90 tablet, Rfl: 4;  topiramate (TOPAMAX) 100 MG tablet, TAKE 1 TABLET (100 MG TOTAL) BY MOUTH 2 (TWO) TIMES DAILY., Disp: 60 tablet, Rfl: 11;  topiramate (TOPAMAX) 25 MG tablet, Take 4 tablets (100 mg total) by mouth 2 (two) times daily., Disp: 240 tablet, Rfl: 5  venlafaxine (EFFEXOR-XR) 150 MG Cp24, TAKE 1 CAPSULE BY MOUTH ONCE DAILY, Disp: 30 capsule, Rfl: 2;  vitamin D (VITAMIN D3) 185 MG Tab, Take 185 mg by mouth once daily., Disp: , Rfl:           Review of Systems   Constitutional: Negative for chills and fever.   HENT: Negative for facial swelling and trouble swallowing.    Eyes: Negative for visual disturbance.   Respiratory: Negative for cough, chest tightness, shortness of breath and wheezing.    Cardiovascular: Negative for chest pain and palpitations.   Gastrointestinal: Positive for abdominal pain. Negative for constipation, nausea and vomiting.        (+) bladder spasms     Genitourinary: Positive for difficulty urinating. Negative for dysuria, hematuria, urgency and vaginal bleeding.        SPT draining well;  (+) bladder spasms worsening.     Musculoskeletal: Positive for arthralgias, back pain and gait problem.   Skin: Negative for rash.   Neurological: Positive for weakness. Negative for dizziness and headaches.   Hematological: Does not bruise/bleed easily.   Psychiatric/Behavioral: Negative for behavioral  problems.       Objective:      Physical Exam   Nursing note and vitals reviewed.  Constitutional: She is oriented to person, place, and time. Vital signs are normal. She appears well-developed and well-nourished. She is active and cooperative.   HENT:   Head: Normocephalic.   Right Ear: External ear normal.   Left Ear: External ear normal.   Nose: Nose normal.   Eyes: Conjunctivae and lids are normal. Right eye exhibits no discharge. Left eye exhibits no discharge. No scleral icterus.   Neck: Trachea normal and normal range of motion. Neck supple. No tracheal deviation present.   Cardiovascular: Normal rate, regular rhythm and intact distal pulses.    Pulmonary/Chest: Effort normal. No respiratory distress.   Abdominal: Soft. Bowel sounds are normal. She exhibits no distension and no mass. There is no tenderness. There is no rebound, no guarding and no CVA tenderness.       Musculoskeletal: Normal range of motion. She exhibits no edema.   Neurological: She is alert and oriented to person, place, and time.   Skin: Skin is warm, dry and intact.     Psychiatric: She has a normal mood and affect. Her behavior is normal. Thought content normal.       Assessment:       1. Chronic suprapubic catheter    2. Neurogenic bladder    3. Bladder spasm    4. Encounter for suprapubic catheter care    5. Bacteria in urine        Plan:         I spent 25 minutes with the patient of which more than half was spent in direct consultation with the patient in regards to our treatment and plan.    Education and recommendations of today's plan of care including home remedies.  We discussed daily care; keeping surrounding tissue clean and dry.  I removed old SPT without difficulty.  I placed a new 16fr SPT easily using sterile technique.  Collected urine from new SPT and sent to lab.   Irrigated to verify the position.  Balloon inflated with 9cc sterile water; plug placed.  Silver Nitrate sticks x 3 to granulation tissue.  Emphasized  importance of good skin care around SPT.  Will contact regarding urine results prior to BOTOX   dsg applied to site.   F/u after BOTOX

## 2018-05-04 LAB
BACTERIA #/AREA URNS AUTO: ABNORMAL /HPF
BILIRUB UR QL STRIP: NEGATIVE
CLARITY UR REFRACT.AUTO: ABNORMAL
COLOR UR AUTO: YELLOW
GLUCOSE UR QL STRIP: NEGATIVE
HGB UR QL STRIP: ABNORMAL
HYALINE CASTS UR QL AUTO: 7 /LPF
KETONES UR QL STRIP: NEGATIVE
LEUKOCYTE ESTERASE UR QL STRIP: ABNORMAL
MICROSCOPIC COMMENT: ABNORMAL
NITRITE UR QL STRIP: NEGATIVE
PH UR STRIP: 7 [PH] (ref 5–8)
PROT UR QL STRIP: ABNORMAL
RBC #/AREA URNS AUTO: >100 /HPF (ref 0–4)
SP GR UR STRIP: 1.01 (ref 1–1.03)
URN SPEC COLLECT METH UR: ABNORMAL
UROBILINOGEN UR STRIP-ACNC: NEGATIVE EU/DL
WBC #/AREA URNS AUTO: 79 /HPF (ref 0–5)
WBC CLUMPS UR QL AUTO: ABNORMAL
YEAST UR QL AUTO: ABNORMAL

## 2018-05-04 RX ORDER — ERGOCALCIFEROL 1.25 MG/1
CAPSULE ORAL
Qty: 4 CAPSULE | Refills: 4 | Status: SHIPPED | OUTPATIENT
Start: 2018-05-04 | End: 2018-09-05 | Stop reason: SDUPTHER

## 2018-05-07 ENCOUNTER — PATIENT MESSAGE (OUTPATIENT)
Dept: INTERNAL MEDICINE | Facility: CLINIC | Age: 68
End: 2018-05-07

## 2018-05-08 ENCOUNTER — PATIENT MESSAGE (OUTPATIENT)
Dept: UROLOGY | Facility: CLINIC | Age: 68
End: 2018-05-08

## 2018-05-08 ENCOUNTER — TELEPHONE (OUTPATIENT)
Dept: UROLOGY | Facility: CLINIC | Age: 68
End: 2018-05-08

## 2018-05-08 DIAGNOSIS — R82.71 BACTERIA IN URINE: Primary | ICD-10-CM

## 2018-05-08 DIAGNOSIS — N32.89 BLADDER SPASM: ICD-10-CM

## 2018-05-08 LAB
BACTERIA UR CULT: NORMAL
BACTERIA UR CULT: NORMAL

## 2018-05-08 RX ORDER — CIPROFLOXACIN 500 MG/1
500 TABLET ORAL 2 TIMES DAILY
Qty: 14 TABLET | Refills: 0 | Status: SHIPPED | OUTPATIENT
Start: 2018-05-08 | End: 2018-05-15

## 2018-05-08 NOTE — TELEPHONE ENCOUNTER
Inform patient  that antibiotic sent to pharmacy [facility];   Cipro 500mg BID   Start today; take until gone.

## 2018-05-09 RX ORDER — INSULIN PUMP SYRINGE, 3 ML
EACH MISCELLANEOUS
Qty: 1 EACH | Refills: 0 | Status: SHIPPED | OUTPATIENT
Start: 2018-05-09 | End: 2019-09-17

## 2018-05-11 ENCOUNTER — TELEPHONE (OUTPATIENT)
Dept: UROLOGY | Facility: CLINIC | Age: 68
End: 2018-05-11

## 2018-05-11 DIAGNOSIS — E11.9 TYPE 2 DIABETES MELLITUS WITHOUT COMPLICATION: ICD-10-CM

## 2018-05-14 ENCOUNTER — ANESTHESIA (OUTPATIENT)
Dept: SURGERY | Facility: HOSPITAL | Age: 68
End: 2018-05-14
Payer: MEDICARE

## 2018-05-14 ENCOUNTER — HOSPITAL ENCOUNTER (OUTPATIENT)
Facility: HOSPITAL | Age: 68
Discharge: HOME | End: 2018-05-14
Attending: UROLOGY | Admitting: UROLOGY
Payer: MEDICARE

## 2018-05-14 VITALS
SYSTOLIC BLOOD PRESSURE: 140 MMHG | HEART RATE: 73 BPM | OXYGEN SATURATION: 97 % | TEMPERATURE: 98 F | RESPIRATION RATE: 15 BRPM | WEIGHT: 289 LBS | BODY MASS INDEX: 43.8 KG/M2 | HEIGHT: 68 IN | DIASTOLIC BLOOD PRESSURE: 77 MMHG

## 2018-05-14 DIAGNOSIS — N13.30 HYDRONEPHROSIS: ICD-10-CM

## 2018-05-14 LAB
POCT GLUCOSE: 164 MG/DL (ref 70–110)
POCT GLUCOSE: 226 MG/DL (ref 70–110)

## 2018-05-14 PROCEDURE — 82962 GLUCOSE BLOOD TEST: CPT | Mod: 91 | Performed by: UROLOGY

## 2018-05-14 PROCEDURE — 36000706: Performed by: UROLOGY

## 2018-05-14 PROCEDURE — 52287 CYSTOSCOPY CHEMODENERVATION: CPT | Mod: ,,, | Performed by: UROLOGY

## 2018-05-14 PROCEDURE — 63600175 PHARM REV CODE 636 W HCPCS: Performed by: STUDENT IN AN ORGANIZED HEALTH CARE EDUCATION/TRAINING PROGRAM

## 2018-05-14 PROCEDURE — 71000015 HC POSTOP RECOV 1ST HR: Performed by: UROLOGY

## 2018-05-14 PROCEDURE — 25000003 PHARM REV CODE 250: Performed by: STUDENT IN AN ORGANIZED HEALTH CARE EDUCATION/TRAINING PROGRAM

## 2018-05-14 PROCEDURE — 25000003 PHARM REV CODE 250: Performed by: ANESTHESIOLOGY

## 2018-05-14 PROCEDURE — 63600175 PHARM REV CODE 636 W HCPCS: Mod: JG | Performed by: UROLOGY

## 2018-05-14 PROCEDURE — 82962 GLUCOSE BLOOD TEST: CPT | Performed by: UROLOGY

## 2018-05-14 PROCEDURE — 63600175 PHARM REV CODE 636 W HCPCS: Performed by: NURSE ANESTHETIST, CERTIFIED REGISTERED

## 2018-05-14 PROCEDURE — 37000008 HC ANESTHESIA 1ST 15 MINUTES: Performed by: UROLOGY

## 2018-05-14 PROCEDURE — D9220A PRA ANESTHESIA: Mod: ANES,,, | Performed by: ANESTHESIOLOGY

## 2018-05-14 PROCEDURE — 51705 CHANGE OF BLADDER TUBE: CPT | Mod: 51,,, | Performed by: UROLOGY

## 2018-05-14 PROCEDURE — 37000009 HC ANESTHESIA EA ADD 15 MINS: Performed by: UROLOGY

## 2018-05-14 PROCEDURE — D9220A PRA ANESTHESIA: Mod: CRNA,,, | Performed by: NURSE ANESTHETIST, CERTIFIED REGISTERED

## 2018-05-14 PROCEDURE — 71000033 HC RECOVERY, INTIAL HOUR: Performed by: UROLOGY

## 2018-05-14 PROCEDURE — 36000707: Performed by: UROLOGY

## 2018-05-14 RX ORDER — PROPOFOL 10 MG/ML
VIAL (ML) INTRAVENOUS
Status: DISCONTINUED | OUTPATIENT
Start: 2018-05-14 | End: 2018-05-14

## 2018-05-14 RX ORDER — LIDOCAINE HCL/PF 100 MG/5ML
SYRINGE (ML) INTRAVENOUS
Status: DISCONTINUED | OUTPATIENT
Start: 2018-05-14 | End: 2018-05-14

## 2018-05-14 RX ORDER — LIDOCAINE HYDROCHLORIDE 10 MG/ML
1 INJECTION, SOLUTION EPIDURAL; INFILTRATION; INTRACAUDAL; PERINEURAL ONCE
Status: DISCONTINUED | OUTPATIENT
Start: 2018-05-14 | End: 2018-05-14 | Stop reason: HOSPADM

## 2018-05-14 RX ORDER — MIDAZOLAM HYDROCHLORIDE 1 MG/ML
INJECTION, SOLUTION INTRAMUSCULAR; INTRAVENOUS
Status: DISCONTINUED | OUTPATIENT
Start: 2018-05-14 | End: 2018-05-14

## 2018-05-14 RX ORDER — FENTANYL CITRATE 50 UG/ML
INJECTION, SOLUTION INTRAMUSCULAR; INTRAVENOUS
Status: DISCONTINUED | OUTPATIENT
Start: 2018-05-14 | End: 2018-05-14

## 2018-05-14 RX ORDER — HYDROMORPHONE HYDROCHLORIDE 1 MG/ML
0.2 INJECTION, SOLUTION INTRAMUSCULAR; INTRAVENOUS; SUBCUTANEOUS EVERY 5 MIN PRN
Status: DISCONTINUED | OUTPATIENT
Start: 2018-05-14 | End: 2018-05-14 | Stop reason: HOSPADM

## 2018-05-14 RX ORDER — SODIUM CHLORIDE 9 MG/ML
INJECTION, SOLUTION INTRAVENOUS CONTINUOUS
Status: DISCONTINUED | OUTPATIENT
Start: 2018-05-14 | End: 2018-05-14 | Stop reason: HOSPADM

## 2018-05-14 RX ORDER — TRAMADOL HYDROCHLORIDE 50 MG/1
50 TABLET ORAL EVERY 6 HOURS PRN
Qty: 5 TABLET | Refills: 0 | Status: SHIPPED | OUTPATIENT
Start: 2018-05-14 | End: 2018-05-24

## 2018-05-14 RX ORDER — SODIUM CHLORIDE 0.9 % (FLUSH) 0.9 %
3 SYRINGE (ML) INJECTION
Status: DISCONTINUED | OUTPATIENT
Start: 2018-05-14 | End: 2018-05-14 | Stop reason: HOSPADM

## 2018-05-14 RX ORDER — PHENYLEPHRINE HYDROCHLORIDE 10 MG/ML
INJECTION INTRAVENOUS
Status: DISCONTINUED | OUTPATIENT
Start: 2018-05-14 | End: 2018-05-14

## 2018-05-14 RX ORDER — PROPOFOL 10 MG/ML
VIAL (ML) INTRAVENOUS CONTINUOUS PRN
Status: DISCONTINUED | OUTPATIENT
Start: 2018-05-14 | End: 2018-05-14

## 2018-05-14 RX ADMIN — PHENYLEPHRINE HYDROCHLORIDE 100 MCG: 10 INJECTION INTRAVENOUS at 07:05

## 2018-05-14 RX ADMIN — GENTAMICIN SULFATE 160 MG: 40 INJECTION, SOLUTION INTRAMUSCULAR; INTRAVENOUS at 07:05

## 2018-05-14 RX ADMIN — FENTANYL CITRATE 50 MCG: 50 INJECTION, SOLUTION INTRAMUSCULAR; INTRAVENOUS at 07:05

## 2018-05-14 RX ADMIN — LIDOCAINE HYDROCHLORIDE 50 MG: 20 INJECTION, SOLUTION INTRAVENOUS at 07:05

## 2018-05-14 RX ADMIN — FENTANYL CITRATE 25 MCG: 50 INJECTION, SOLUTION INTRAMUSCULAR; INTRAVENOUS at 07:05

## 2018-05-14 RX ADMIN — MIDAZOLAM HYDROCHLORIDE 2 MG: 1 INJECTION, SOLUTION INTRAMUSCULAR; INTRAVENOUS at 06:05

## 2018-05-14 RX ADMIN — PROPOFOL 75 MCG/KG/MIN: 10 INJECTION, EMULSION INTRAVENOUS at 07:05

## 2018-05-14 RX ADMIN — AMPICILLIN SODIUM 1 G: 1 INJECTION, POWDER, FOR SOLUTION INTRAMUSCULAR; INTRAVENOUS at 07:05

## 2018-05-14 RX ADMIN — PROPOFOL 50 MG: 10 INJECTION, EMULSION INTRAVENOUS at 07:05

## 2018-05-14 RX ADMIN — SODIUM CHLORIDE: 0.9 INJECTION, SOLUTION INTRAVENOUS at 06:05

## 2018-05-14 NOTE — PLAN OF CARE
Patient tolerated procedure/anesthesia well, vss, no distress noted or reported. Denies pain related to procedure/ c/o chronic back pain, states pain is always there has home meds available for back pain. Denies nausea, tolerates PO. Patient has suprapubic cath, draining without difficulties. Discharge instructions reviewed with patient and family, verbalized understanding, consents with chart.

## 2018-05-14 NOTE — ANESTHESIA POSTPROCEDURE EVALUATION
"Anesthesia Post Evaluation    Patient: Cecile Bowen    Procedure(s) Performed: Procedure(s) (LRB):  CYSTOSCOPY (N/A)  INJECTION-BOTOX (N/A)  INSERTION-CATHETER-SUPRAPUBIC CHANGE (N/A)    Final Anesthesia Type: general  Patient location during evaluation: PACU  Patient participation: Yes- Able to Participate  Level of consciousness: awake and alert  Post-procedure vital signs: reviewed and stable  Pain management: adequate  Airway patency: patent  PONV status at discharge: No PONV  Anesthetic complications: no      Cardiovascular status: blood pressure returned to baseline  Respiratory status: unassisted, room air and spontaneous ventilation  Hydration status: euvolemic  Follow-up not needed.        Visit Vitals  BP (!) 140/77   Pulse 73   Temp 36.6 °C (97.9 °F) (Temporal)   Resp 15   Ht 5' 8" (1.727 m)   Wt 131.1 kg (289 lb)   SpO2 97%   BMI 43.94 kg/m²       Pain/Audrey Score: Pain Assessment Performed: Yes (5/14/2018  8:27 AM)  Presence of Pain: complains of pain/discomfort (5/14/2018  8:27 AM)  Audrey Score: 10 (5/14/2018  8:27 AM)      "

## 2018-05-14 NOTE — OP NOTE
[facility]  Operative Note    Date: 05/14/2018    Pre-Op Diagnosis: neurogenic bladder, hydronephrosis    Post-Op Diagnosis: same    Procedure(s) Performed:   1.  Cystoscopy with bladder botox injection  2.  Suprapubic catheter change.     Specimen(s): none    Staff Surgeon:  Ronel Whittington MD    Assistant Surgeon: Anders Rose MD    Anesthesia: Monitored Local Anesthesia with Sedation    Indications: Cecile Bowen is a 67 y.o. female with neurogenic bladder, bilateral Gr III-IV reflux, hx bilateral hydroneprhosis, managed with SPT and botox injection. Last injection 1.2/14/2015    Findings:   Normal cystoscopy.  300u in 30cc injected into bladder detrusor.   SPT changed without issue    Estimated Blood Loss: min    Drains: none    Procedure in Detail:  After informed consent was obtained the patient was brought to the cystoscopy suite and placed in the supine position.  SCDs were applied and working.  Anesthesia was administered.  When the patient was adequately sedated she was placed in the dorsal lithotomy position and prepped and draped in the usual sterile fashion.      A rigid cystoscope in a 22 Fr sheath was introduced into the patients's bladder via the urethra.  This passed easily.  Formal cystoscopy was performed which revealed the ureteral orifices in their normal anatomic position bilaterally.  No bladder masses, trabeculations, stones or diverticuli were seen.      300U units of botox in 30cc was injected into the detrusor muscle throughout the bladder.  Good wheals were raised.  The patient's bladder was drained and the cystoscope was removed.     We then exchanged the patient's suprapubic catheter with a new 16 fr murillo catheter.     The patient tolerated the procedure well and was transferred to the recovery room in stable condition.      Disposition:  The patient will follow up with Dr. Whittington in 4 weeks.  She was given prescriptions for tramadol.  She knows how to  self-cath should she have any issues with retention.      Anders Rose MD

## 2018-05-14 NOTE — DISCHARGE INSTRUCTIONS
Cystoscopy    Cystoscopy is a procedure that lets your doctor look directly inside your urethra and bladder. It can be used to:  · Help diagnose a problem with your urethra, bladder, or kidneys.  · Take a sample (biopsy) of bladder or urethral tissue.  · Treat certain problems (such as removing kidney stones).  · Place a stent to bypass an obstruction.  · Take special X-rays of the kidneys.  Based on the findings, your doctor may recommend other tests or treatments.  What is a cystoscope?  A cystoscope is a telescope-like instrument that contains lenses and fiberoptics (small glass wires that make bright light). The cystoscope may be straight and rigid, or flexible to bend around curves in the urethra. The doctor may look directly into the cystoscope, or project the image onto a monitor.  Getting ready  · Ask your doctor if you should stop taking any medicines before the procedure.  · Ask whether you should avoid eating or drinking anything after midnight before the procedure.  · Follow any other instructions your doctor gives you.  Tell your doctor before the exam if you:  · Take any medicines, such as aspirin or blood thinners  · Have allergies to any medicines  · Are pregnant   The procedure  Cystoscopy is done in the doctors office, surgery center, or hospital. The doctor and a nurse are present during the procedure. It takes only a few minutes, longer if a biopsy, X-ray, or treatment needs to be done.  During the procedure:  · You lie on an exam table on your back, knees bent and legs apart. You are covered with a drape.  · Your urethra and the area around it are washed. Anesthetic jelly may be applied to numb the urethra. Other pain medicine is usually not needed. In some cases, you may be offered a mild sedative to help you relax. If a more extensive procedure is to be done, such as a biopsy or kidney stone removal, general anesthesia may be needed.  · The cystoscope is inserted. A sterile fluid is put  into the bladder to expand it. You may feel pressure from this fluid.  · When the procedure is done, the cystoscope is removed.  After the procedure  If you had a sedative, general anesthesia, or spinal anesthesia, you must have someone drive you home. Once youre home:  · Drink plenty of fluids.  · You may have burning or light bleeding when you urinate--this is normal.  · Medicines may be prescribed to ease any discomfort or prevent infection. Take these as directed.  · Call your doctor if you have heavy bleeding or blood clots, burning that lasts more than a day, a fever over 100°F  (38° C), or trouble urinating.  Date Last Reviewed: 1/1/2017  © 7951-8426 [employer] [place]. [address] [place] [address], [place] [address]. All rights reserved. This information is not intended as a substitute for professional medical care. Always follow your healthcare professional's instructions.

## 2018-05-14 NOTE — INTERVAL H&P NOTE
The patient has been examined and the H&P has been reviewed:    I concur with the findings and no changes have occurred since H&P was written.    Anesthesia/Surgery risks, benefits and alternative options discussed and understood by patient/family.          Active Hospital Problems    Diagnosis  POA    Hydronephrosis [N13.30]  Yes      Resolved Hospital Problems    Diagnosis Date Resolved POA   No resolved problems to display.     
No

## 2018-05-14 NOTE — TRANSFER OF CARE
"Anesthesia Transfer of Care Note    Patient: Cecile Bowen    Procedure(s) Performed: Procedure(s) (LRB):  CYSTOSCOPY (N/A)  INJECTION-BOTOX (N/A)  INSERTION-CATHETER-SUPRAPUBIC CHANGE (N/A)    Patient location: PACU    Anesthesia Type: general    Transport from OR: Transported from OR on 6-10 L/min O2 by face mask with adequate spontaneous ventilation    Post pain: adequate analgesia    Post assessment: tolerated procedure well and no apparent anesthetic complications    Post vital signs: stable    Level of consciousness: awake, alert and oriented    Nausea/Vomiting: no nausea/vomiting    Complications: none    Transfer of care protocol was followed      Last vitals:   Visit Vitals  /79 (BP Location: Right arm, Patient Position: Lying)   Pulse 101   Temp 37.1 °C (98.8 °F) (Temporal)   Resp 16   Ht 5' 8" (1.727 m)   Wt 131.1 kg (289 lb)   SpO2 (!) 93%   BMI 43.94 kg/m²     "

## 2018-05-14 NOTE — DISCHARGE SUMMARY
[facility]  Discharge Note  Short Stay    Admit Date: 5/14/2018    Discharge Date and Time: 05/14/2018 8:07 AM      Attending Physician: Ronel Whittington MD     Discharge Provider: Anders Rose    Diagnoses:  Active Hospital Problems    Diagnosis  POA    *Hydronephrosis [N13.30]  Yes      Resolved Hospital Problems    Diagnosis Date Resolved POA   No resolved problems to display.       Discharged Condition: good    Hospital Course: Patient was admitted for cysto botox and tolerated the procedure well with no complications. The patient was discharged home in good condition on the same day.       Final Diagnoses: Same as principal problem.    Disposition: Home or Self Care    Follow up/Patient Instructions:    Medications:  Reconciled Home Medications: Current Discharge Medication List      START taking these medications    Details   traMADol (ULTRAM) 50 mg tablet Take 1 tablet (50 mg total) by mouth every 6 (six) hours as needed for Pain.  Qty: 5 tablet, Refills: 0         CONTINUE these medications which have NOT CHANGED    Details   aspirin-acetaminophen-caffeine 250-250-65 mg (EXCEDRIN MIGRAINE) 250-250-65 mg per tablet Take 1 tablet by mouth every 6 (six) hours as needed for Pain.      ciprofloxacin HCl (CIPRO) 500 MG tablet Take 1 tablet (500 mg total) by mouth 2 (two) times daily.  Qty: 14 tablet, Refills: 0    Associated Diagnoses: Bacteria in urine; Bladder spasm      cloNIDine (CATAPRES) 0.1 MG tablet One po q8 hours prn SBP > 170  Qty: 90 tablet, Refills: 3      cyanocobalamin, vitamin B-12, (VITAMIN B-12) 5,000 mcg Subl Place 1 tablet under the tongue once daily.      ferrous sulfate 325 (65 FE) MG EC tablet Take 325 mg by mouth once daily.       gemfibrozil (LOPID) 600 MG tablet Patient takes one tablet every other day  Qty: 60 tablet, Refills: 3      hydrocodone-acetaminophen 10-325mg (NORCO)  mg Tab Take 1 tablet by mouth every 6 (six) hours as needed.  Qty: 120 tablet, Refills: 0       insulin lispro (HUMALOG) 100 unit/mL injection Inject under the skin 7 units in the AM, 10 units for lunch, 12 units for Dinner , before meals  Qty: 10 mL, Refills: 11      !! LANTUS SOLOSTAR 100 unit/mL (3 mL) InPn pen INJECT 19 UNITS INTO THE SKIN EVERY EVENING.  Qty: 15 Syringe, Refills: 3      levothyroxine (SYNTHROID) 50 MCG tablet Take 1 tablet (50 mcg total) by mouth once daily.  Qty: 30 tablet, Refills: 11      magnesium oxide (MAG-OX) 400 mg tablet TAKE 1 TABLET (400 MG TOTAL) BY MOUTH 2 (TWO) TIMES DAILY.  Qty: 180 tablet, Refills: 3      methocarbamol (ROBAXIN) 750 MG Tab TAKE 1-2 TABLETS BY MOUTH 4 TIMES DAILY AS NEEDED  Qty: 180 tablet, Refills: 2      multivitamin with minerals tablet Take 1 tablet by mouth once daily.      oxybutynin (DITROPAN-XL) 10 MG 24 hr tablet Take 10 mg by mouth once daily.  Refills: 9      pravastatin (PRAVACHOL) 40 MG tablet TAKE 1 TABLET BY MOUTH EVERY DAY  Qty: 30 tablet, Refills: 5      sodium bicarbonate 650 MG tablet TAKE 1 TABLET (650 MG TOTAL) BY MOUTH 3 (THREE) TIMES DAILY.  Qty: 270 tablet, Refills: 3      !! sumatriptan (IMITREX) 100 MG tablet Take 1 tablet (100 mg total) by mouth 2 (two) times daily as needed for Migraine.  Qty: 9 tablet, Refills: 11    Associated Diagnoses: Intractable chronic migraine without aura and with status migrainosus      !! sumatriptan (IMITREX) 100 MG tablet TAKE 1 TABLET (100 MG TOTAL) BY MOUTH 2 (TWO) TIMES DAILY AS NEEDED FOR MIGRAINE.  Qty: 9 tablet, Refills: 6    Associated Diagnoses: Intractable chronic migraine without aura and with status migrainosus      topiramate (TOPAMAX) 200 MG Tab Take 1 tablet (200 mg total) by mouth 2 (two) times daily.  Qty: 180 tablet, Refills: 3    Associated Diagnoses: Intractable chronic migraine without aura and with status migrainosus      venlafaxine (EFFEXOR-XR) 75 MG 24 hr capsule Take 2 capsules (150 mg total) by mouth once daily.  Qty: 180 capsule, Refills: 1      BD INSULIN PEN NEEDLE  "UF SHORT 31 gauge x 5/16" Ndle USE ONCE DAILY WITH LANTUS SOLOSTAR PEN INSULIN  Qty: 150 each, Refills: 3    Associated Diagnoses: Type 2 diabetes mellitus not at goal      BD INSULIN SYRINGE ULTRA-FINE 1/2 mL 31 gauge x 15/64" Syrg USE WITH INSULIN 4 TIMES A DAY  Qty: 100 Syringe, Refills: 4      blood sugar diagnostic Strp ONE TOUCH ULTRA TEST STRIPS//Check blood sugars QID  Qty: 200 strip, Refills: 6      blood-glucose meter kit ONE TOUCH ULTRA  Qty: 1 each, Refills: 0      ergocalciferol (VITAMIN D2) 50,000 unit Cap TAKE 1 CAPSULE (50,000 UNITS TOTAL) BY MOUTH EVERY 7 DAYS.  Qty: 4 capsule, Refills: 4      lancets (ONE TOUCH DELICA LANCETS) Misc Ultra 2 lancets to check blood sugar BID  Qty: 100 each, Refills: 6      !! LANTUS SOLOSTAR U-100 INSULIN 100 unit/mL (3 mL) InPn pen INJECT 19 UNITS INTO THE SKIN EVERY EVENING.  Qty: 15 Syringe, Refills: 3      riboflavin, vitamin B2, 400 mg Tab Take 400 mg by mouth once daily.  Qty: 90 tablet, Refills: 3    Associated Diagnoses: Intractable chronic migraine without aura and with status migrainosus      scopolamine (TRANSDERM-SCOP) 1.3-1.5 mg (1 mg over 3 days) Place 1 patch onto the skin every 72 hours.  Qty: 4 patch, Refills: 0       !! - Potential duplicate medications found. Please discuss with provider.          Discharge Procedure Orders  Call MD for:  persistent nausea and vomiting or diarrhea     Call MD for:  severe uncontrolled pain     Call MD for:  persistent dizziness, light-headedness, or visual disturbances     Call MD for:   Order Comments: Temperature > 101F       Follow-up Information     Ronel Whittington MD In 4 weeks.    Specialty:  Urology  Contact information:  [address]  [place] [address] [phone]                   Discharge Procedure Orders (must include Diet, Follow-up, Activity):    Discharge Procedure Orders (must include Diet, Follow-up, Activity)  Call MD for:  persistent nausea and vomiting or diarrhea     Call MD " for:  severe uncontrolled pain     Call MD for:  persistent dizziness, light-headedness, or visual disturbances     Call MD for:   Order Comments: Temperature > 101F

## 2018-05-16 ENCOUNTER — CLINICAL SUPPORT (OUTPATIENT)
Dept: REHABILITATION | Facility: HOSPITAL | Age: 68
End: 2018-05-16
Attending: ANESTHESIOLOGY
Payer: MEDICARE

## 2018-05-16 DIAGNOSIS — M54.50 CHRONIC BILATERAL LOW BACK PAIN WITHOUT SCIATICA: ICD-10-CM

## 2018-05-16 DIAGNOSIS — G89.29 CHRONIC BILATERAL LOW BACK PAIN WITHOUT SCIATICA: ICD-10-CM

## 2018-05-16 DIAGNOSIS — R53.1 WEAKNESS GENERALIZED: ICD-10-CM

## 2018-05-16 PROCEDURE — G8979 MOBILITY GOAL STATUS: HCPCS | Mod: CK,PO

## 2018-05-16 PROCEDURE — 97110 THERAPEUTIC EXERCISES: CPT | Mod: PO

## 2018-05-16 PROCEDURE — G8978 MOBILITY CURRENT STATUS: HCPCS | Mod: CL,PO

## 2018-05-16 PROCEDURE — 97161 PT EVAL LOW COMPLEX 20 MIN: CPT | Mod: PO

## 2018-05-16 NOTE — PATIENT INSTRUCTIONS
Lumbar Rotation    Feet on floor, slowly rock knees from side to side in small, pain-free range of motion. Allow lower back to rotate slightly, but keep shoulders flat on ground. Hold 5 seconds.  Repeat 10 times per set. Do 2 sets per session. Do 2 sessions per day.      Glute Squeeze    Lie face up or down and squeeze your rear end. Do not tighten your abdominals or hold your breath. Hold 5 seconds. Relax.  Repeat 10 times per set. Do 2 sets per session. Do 2 sessions per day.      Pelvic Tilt    Flatten back by tightening stomach muscles and buttocks. Do not hold your breath.  Hold 5 seconds.  Repeat 10 times per set. Do 2 sets per session. Do 2 sessions per day.    Copyright © [employer]. All rights reserved.

## 2018-05-16 NOTE — PLAN OF CARE
"OUTPATIENT PHYSICAL THERAPY  PHYSICAL THERAPY EVALUATION    Name: Cecile Bowen  Clinic Number: [id number]    Evaluation Date: 05/16/2018  Visit #: 1 / 20  Authorization period Expiration: 12/31/2018  Plan of Care Expiration: 07/16/2018  Precautions: Standard     Diagnosis:   Encounter Diagnoses   Name Primary?    Chronic bilateral low back pain without sciatica     Weakness generalized      Physician: April Torres MD  Treatment Orders: PT Eval and Treat  Past Medical History:   Diagnosis Date    Back pain     CKD (chronic kidney disease) stage 3, GFR 30-59 ml/min     as per patient    Diabetes type 2, uncontrolled 12/12/2012    DJD (degenerative joint disease) 12/12/2012    Hypertension 12/12/2012    Hypothyroidism 12/12/2012    Levoscoliosis     Migraine     Nuclear sclerosis - Both Eyes 3/24/2014    Obesity 12/12/2012     Current Outpatient Prescriptions   Medication Sig    aspirin-acetaminophen-caffeine 250-250-65 mg (EXCEDRIN MIGRAINE) 250-250-65 mg per tablet Take 1 tablet by mouth every 6 (six) hours as needed for Pain.    BD INSULIN PEN NEEDLE UF SHORT 31 gauge x 5/16" Ndle USE ONCE DAILY WITH LANTUS SOLOSTAR PEN INSULIN    BD INSULIN SYRINGE ULTRA-FINE 1/2 mL 31 gauge x 15/64" Syrg USE WITH INSULIN 4 TIMES A DAY    blood sugar diagnostic Strp ONE TOUCH ULTRA TEST STRIPS//Check blood sugars QID    blood-glucose meter kit ONE TOUCH ULTRA    cloNIDine (CATAPRES) 0.1 MG tablet One po q8 hours prn SBP > 170    cyanocobalamin, vitamin B-12, (VITAMIN B-12) 5,000 mcg Subl Place 1 tablet under the tongue once daily.    ergocalciferol (VITAMIN D2) 50,000 unit Cap TAKE 1 CAPSULE (50,000 UNITS TOTAL) BY MOUTH EVERY 7 DAYS.    ferrous sulfate 325 (65 FE) MG EC tablet Take 325 mg by mouth once daily.     gemfibrozil (LOPID) 600 MG tablet Patient takes one tablet every other day    hydrocodone-acetaminophen 10-325mg (NORCO)  mg Tab Take 1 tablet by mouth every 6 (six) hours as needed.    " insulin lispro (HUMALOG) 100 unit/mL injection Inject under the skin 7 units in the AM, 10 units for lunch, 12 units for Dinner , before meals    lancets (ONE TOUCH DELICA LANCETS) Misc Ultra 2 lancets to check blood sugar BID    LANTUS SOLOSTAR 100 unit/mL (3 mL) InPn pen INJECT 19 UNITS INTO THE SKIN EVERY EVENING.    LANTUS SOLOSTAR U-100 INSULIN 100 unit/mL (3 mL) InPn pen INJECT 19 UNITS INTO THE SKIN EVERY EVENING.    levothyroxine (SYNTHROID) 50 MCG tablet Take 1 tablet (50 mcg total) by mouth once daily.    magnesium oxide (MAG-OX) 400 mg tablet TAKE 1 TABLET (400 MG TOTAL) BY MOUTH 2 (TWO) TIMES DAILY.    methocarbamol (ROBAXIN) 750 MG Tab TAKE 1-2 TABLETS BY MOUTH 4 TIMES DAILY AS NEEDED    multivitamin with minerals tablet Take 1 tablet by mouth once daily.    oxybutynin (DITROPAN-XL) 10 MG 24 hr tablet Take 10 mg by mouth once daily.    pravastatin (PRAVACHOL) 40 MG tablet TAKE 1 TABLET BY MOUTH EVERY DAY    riboflavin, vitamin B2, 400 mg Tab Take 400 mg by mouth once daily.    scopolamine (TRANSDERM-SCOP) 1.3-1.5 mg (1 mg over 3 days) Place 1 patch onto the skin every 72 hours.    sodium bicarbonate 650 MG tablet TAKE 1 TABLET (650 MG TOTAL) BY MOUTH 3 (THREE) TIMES DAILY.    sumatriptan (IMITREX) 100 MG tablet Take 1 tablet (100 mg total) by mouth 2 (two) times daily as needed for Migraine.    sumatriptan (IMITREX) 100 MG tablet TAKE 1 TABLET (100 MG TOTAL) BY MOUTH 2 (TWO) TIMES DAILY AS NEEDED FOR MIGRAINE.    topiramate (TOPAMAX) 200 MG Tab Take 1 tablet (200 mg total) by mouth 2 (two) times daily.    traMADol (ULTRAM) 50 mg tablet Take 1 tablet (50 mg total) by mouth every 6 (six) hours as needed for Pain.    venlafaxine (EFFEXOR-XR) 75 MG 24 hr capsule Take 2 capsules (150 mg total) by mouth once daily.     No current facility-administered medications for this visit.      Review of patient's allergies indicates:  No Known Allergies    History   Prior Therapy: Prior therapy years  "ago for lower extremities   Social History: Lives in [facility] with disabled brother and sister   Previous functional status: Prior to incident, pt independent in all ADLs and physical activity    Current functional status: Able to complete ADLs with additional time; unable to cook or perform housework   Work: Retired     Subjective   History of Present Illness: Pt presents to [facility] with complaints of chronic history of LBP. Per pt report, she has been diagnosed with DDD, OA, and scoliosis. Pt recently received RFA one month ago to lumbar spine with little pain relief. Pt mostly non-ambulatory and uses scooter for community distances. Pt uses cane to ambulate within the home. Pt additionally c/o B knee with L>R. Pt with prior history of multiple falls; however, has not had a fall in the past year. Pt with no specific aggravating factors, and reports back will spasm at random. Pt. denies history of minor or major trauma, motor vehicle accident, fall; past or present cancer; fever, chills, night sweats; unexplained weight change in past 3 months; recent infection; persistent night pain; saddle anesthesia, or changes in bowel/bladder function.      DOI: Chronic > 5 years   Imaging, bone scan films: 01/26/2018 " DJD."  Pain: current 7/10, worst 9/10, best 6/10, twisting, constant  Radicular symptoms: None   Aggravating factors: Bending forward, stationary positioning   Easing factors: heat and muscle relaxers/pain medication   Pts goals: Return to PLOF, return to long distance driving,     Objective   Mental status: alert, interactive, oriented x3  Posture/ Alignment: In standing, pt with wide CHRIS, mild genu valgum, increased anterior pelvic tilt, and forward head.     Movement Analysis: Pt uses wide CHRIS to perform sit<>stand transfer with UE support. Pt required Carlos to transfer from supine to sitting.     GAIT DEVIATIONS: Cecile amb with decreased sen, decreased step length and decreased weight-shifting " "ability.    ROM:     AROM ROM (% of normal) Comment Normal   Flexion: 50% Pt required UE support for balance 60 deg   Extension: 25%  25 deg   Lateral Flex R: 100% With trunk rotation  25 deg   Lateral Flex L: 75% With trunk rotation  25 deg   Rotation R: 75%  18 deg   Rotation L: 75%  18 deg   *denotes pain        Strength:    Right Left Comment   Shoulder flexion: 4+/5 5/5    Shoulder Abduction: 4+/5 4+/5    Elbow Flexion: 5/5 5/5    Elbow Extension: 5/5 5/5     5/5 5/5           Right Left Comment   Hip Flexion: 4+/5 4+/5    Knee Extension: 5/5 5/5    Knee Flexion: 4+/5 4+/5    Ankle DF: 5/5 5/5        Special Tests:  5 x STS: 18 seconds with UE support     Neurovascular:  - Sensation: Grossly intact to light touch across BLE  - DTR: 2+ Patella B  - Clonus: negative   - Street: negaitve     Palpation:  Pt with increased muscle tone along lumbar and thoracic paraspinals. Pt TTP along lumbar paraspinals.     Joint Play:  Not assessed     Pt/family was provided educational information, including: role of PT, goals for PT, scheduling - pt verbalized understanding. Discussed insurance plan with pt.     TREATMENT   Time In: 10:58 AM  Time Out: 11:50 AM    Discussed Plan of Care with patient: Yes    Cecile received 10 minutes of therapeutic exercises including:   Ball Roll outs fwd 5 x 10" hold   LTR x 10   Glut sets 20 x5" hold   Pelvic tilts 20 x 5" hold     (next visit: TA pulldowns, lateral ball roll outs, hooklying hip abd/add, supine marches, STM to lumbar paraspinals with fwd bend, lateral walking at counter)      Written Home Exercises Provided: See Patient Instructions   Exercises were reviewed and Cecile was able to demonstrate them prior to the end of the session. Pt received a written copy of exercises to perform at home. Cecile demonstrated good  understanding of the education provided.     Assessment   Cecile is a 67 y.o. female referred to outpatient physical therapy with a medical diagnosis of " chronic pain disorder, lumbar spondylosis, myalgia, and physical deconditioning. Pt demonstrates generalized muscle weakness and decreased functional mobility. Pt will benefit from skilled therapy services at this time to improve lumbar stabilization, gross muscle strength, and overall functional mobility, so she may return to PLOF. Pt prognosis is Fair. Positive prognostic factors include motivation for therapy services. Negative prognostic factors include multiple co-morbidities, severity and chronicity of symptoms. Pt will benefit from skilled outpatient physical therapy to address the above stated deficits, provide pt/family education, and to maximize pt's level of independence.     History  Co-morbidities and personal factors that may impact the plan of care Examination  Body Structures and Functions, activity limitations and participation restrictions that may impact the plan of care    Clinical Presentation   Co-morbidities:   high BMI and prior lumbar surgery        Personal Factors:   no deficits Body Regions:   back    Body Systems:   ROM  strength  gait  transfers        Participation Restrictions:   Pt unable to perform housework and cooking. Pt unable to ambulate community distances.      Activity limitations:   Learning and applying knowledge  no deficits    General Tasks and Commands  no deficits    Communication  no deficits    Mobility  no deficits    Self care  no deficits    Domestic Life  cooking  doing house work (cleaning house, washing dishes, laundry)  assisting others    Interactions/Relationships  no deficits    Life Areas  no deficits    Community and Social Life  no deficits         stable and uncomplicated                      low   moderate  high Decision Making/ Complexity Score:  low     CMS Impairment/Limitation/Restriction for FOTO Lumbar Spine Survey    Status  Limitation   Intake   30%   70%   Predicted  42%   58%     G-Code   CMS Severity Modifier   Current Status CL - At least  60 percent but less than 80 percent  Goal Status+   CK - At least 40 percent but less than 60 percent    Pt's spiritual, cultural and educational needs considered and pt agreeable to plan of care and goals as stated below:     Anticipated Barriers for physical therapy: none     Short Term GOALS:  In 4 weeks, pt will:  Report decreased back pain < / = 6 /10 to increase tolerance for walking long distances  Pt will perform sit to stand transfer from standard height chair with no use of UEs and < or = min A in order to be independent with transfers.  Pt will report > or = 20% decrease in difficulty performing household chores in order to improve functional mobility.   Pt to tolerate HEP to improve ROM and independence with ADL's      Long term goals  In 8 weeks, pt will:  Pt will be able to perform 5 consecutive sit to stand transfers s/ UE use on first attempts from std ht chair in order to improve transfer skills  Pt will be able to perform bridging x 5 without pain and clearing the gluts to improve standing tolerance   Pt will be able to demonstrate a proper TA contraction in order to provide stability in lumbar spine with functional tasks.  Pt demonstrate independence in HEP and POC to improve ROM and independence with ADL's.      Plan   Outpatient physical therapy 1 - 2 times weekly to include: pt ed, HEP, therapeutic exercises, therapeutic activities, neuromuscular re-education/ balance exercises, manual therapy, dry needling, and modalities prn. Cont PT for 6-8 weeks. Pt may be seen by PTA as part of the rehabilitation team.     I certify the need for these services furnished under this plan of treatment and while under my care.    Adia Foy, PT

## 2018-05-25 ENCOUNTER — CLINICAL SUPPORT (OUTPATIENT)
Dept: REHABILITATION | Facility: HOSPITAL | Age: 68
End: 2018-05-25
Attending: ANESTHESIOLOGY
Payer: MEDICARE

## 2018-05-25 DIAGNOSIS — M54.50 CHRONIC BILATERAL LOW BACK PAIN WITHOUT SCIATICA: ICD-10-CM

## 2018-05-25 DIAGNOSIS — R53.1 WEAKNESS GENERALIZED: ICD-10-CM

## 2018-05-25 DIAGNOSIS — G89.29 CHRONIC BILATERAL LOW BACK PAIN WITHOUT SCIATICA: ICD-10-CM

## 2018-05-25 PROCEDURE — 97110 THERAPEUTIC EXERCISES: CPT | Mod: PO

## 2018-05-25 NOTE — PROGRESS NOTES
OUTPATIENT PHYSICAL THERAPY  PHYSICAL THERAPY PROGRESS NOTE     Name: Cecile Bowen  Clinic Number: [id number]     Evaluation Date: 05/16/2018  Visit #: 2/ 20  Authorization period Expiration: 12/31/2018  Plan of Care Expiration: 07/16/2018  Precautions: Standard      Diagnosis:        Encounter Diagnoses   Name Primary?    Chronic bilateral low back pain without sciatica      Weakness generalized        Physician: April Torres MD  Treatment Orders: PT Eval and Treat       Past Medical History:   Diagnosis Date    Back pain      CKD (chronic kidney disease) stage 3, GFR 30-59 ml/min       as per patient    Diabetes type 2, uncontrolled 12/12/2012    DJD (degenerative joint disease) 12/12/2012    Hypertension 12/12/2012    Hypothyroidism 12/12/2012    Levoscoliosis      Migraine      Nuclear sclerosis - Both Eyes 3/24/2014    Obesity 12/12/2012          History of Present Illness: Pt presents to [facility] with complaints of chronic history of LBP. Per pt report, she has been diagnosed with DDD, OA, and scoliosis. Pt recently received RFA one month ago to lumbar spine with little pain relief. Pt mostly non-ambulatory and uses scooter for community distances. Pt uses cane to ambulate within the home. Pt additionally c/o B knee with L>R. Pt with prior history of multiple falls; however, has not had a fall in the past year. Pt with no specific aggravating factors, and reports back will spasm at random.           History   Prior Therapy: Prior therapy years ago for lower extremities   Social History: Lives in [facility] with disabled brother and sister   Previous functional status: Prior to incident, pt independent in all ADLs and physical activity    Current functional status: Able to complete ADLs with additional time; unable to cook or perform housework   Work: Retired      Subjective     Pt reports her pain fluctuates between a 6-7/10 most days.  Pt reports she took a pain pill and a couple of muscle  "relaxers before coming to treatment but she says " it doesn't affect me."    Pain: 7/10 back    Objective     Observation: Pt arrived to therapy amb on scooter. Pt able to transfer and perform all exercises independently    Time In: 12:14 pm  Time Out: 1:00 pm    Cecile received 46 minutes of therapeutic exercises including:     Ball Roll outs fwd 10 x 5" hold   Lateral ball roll outs x 10 5" hold  LTR x 10   Glut sets 20 x5" hold   Pelvic tilts 20 x 5" hold   Hooklying hip abd GTB x 10 5" hold  Hooklying add x 10 5"  LAQ x 10  Seated Marches x 10  Seated shoulder rows with OTB x 10  Seated shoulder extension with OTB x 10    * supine marches caused increased pain    Pt received 1 on 1 therapeutic exercise for 24 minutes     (next visit: TA pulldowns, STM to lumbar paraspinals with fwd bend, lateral walking at counter)        Written Home Exercises Provided: Bolded exercises were printed for hep.  Exercises were reviewed and Cecile was able to demonstrate them prior to the end of the session. Pt received a written copy of exercises to perform at home. Cecile demonstrated good  understanding of the education provided.            Assessment     Pt tolerated treatment well with reports of increased pain following treatment.  Pt was able to tolerate exercises with some pain.  Will try to progress within tolerance.   Pt will benefit from skilled outpatient physical therapy to address the above stated deficits, provide pt/family education, and to maximize pt's level of independence.              History  Co-morbidities and personal factors that may impact the plan of care Examination  Body Structures and Functions, activity limitations and participation restrictions that may impact the plan of care      Clinical Presentation   Co-morbidities:   high BMI and prior lumbar surgery           Personal Factors:   no deficits Body Regions:   back     Body Systems:   ROM  strength  gait  transfers           Participation " Restrictions:   Pt unable to perform housework and cooking. Pt unable to ambulate community distances.        Activity limitations:   Learning and applying knowledge  no deficits     General Tasks and Commands  no deficits     Communication  no deficits     Mobility  no deficits     Self care  no deficits     Domestic Life  cooking  doing house work (cleaning house, washing dishes, laundry)  assisting others     Interactions/Relationships  no deficits     Life Areas  no deficits     Community and Social Life  no deficits             stable and uncomplicated                                low   moderate   high Decision Making/ Complexity Score:  low      CMS Impairment/Limitation/Restriction for FOTO Lumbar Spine Survey                          Status             Limitation   Intake              30%                 70%   Predicted        42%                 58%      G-Code                        CMS Severity Modifier   Current Status          CL - At least 60 percent but less than 80 percent  Goal Status+              CK - At least 40 percent but less than 60 percent     Pt's spiritual, cultural and educational needs considered and pt agreeable to plan of care and goals as stated below:      Anticipated Barriers for physical therapy: none      Short Term GOALS:  In 4 weeks, pt will:  Report decreased back pain < / = 6 /10 to increase tolerance for walking long distances  Pt will perform sit to stand transfer from standard height chair with no use of UEs and < or = min A in order to be independent with transfers.  Pt will report > or = 20% decrease in difficulty performing household chores in order to improve functional mobility.   Pt to tolerate HEP to improve ROM and independence with ADL's

## 2018-05-29 RX ORDER — CLONIDINE HYDROCHLORIDE 0.1 MG/1
TABLET ORAL
Qty: 90 TABLET | Refills: 3 | Status: SHIPPED | OUTPATIENT
Start: 2018-05-29 | End: 2018-06-20 | Stop reason: SDUPTHER

## 2018-06-01 ENCOUNTER — CLINICAL SUPPORT (OUTPATIENT)
Dept: REHABILITATION | Facility: HOSPITAL | Age: 68
End: 2018-06-01
Attending: ANESTHESIOLOGY
Payer: MEDICARE

## 2018-06-01 DIAGNOSIS — G89.29 CHRONIC BILATERAL LOW BACK PAIN WITHOUT SCIATICA: ICD-10-CM

## 2018-06-01 DIAGNOSIS — R53.1 WEAKNESS GENERALIZED: ICD-10-CM

## 2018-06-01 DIAGNOSIS — M54.50 CHRONIC BILATERAL LOW BACK PAIN WITHOUT SCIATICA: ICD-10-CM

## 2018-06-01 PROCEDURE — 97110 THERAPEUTIC EXERCISES: CPT | Mod: PO

## 2018-06-01 NOTE — PROGRESS NOTES
OUTPATIENT PHYSICAL THERAPY  PHYSICAL THERAPY PROGRESS NOTE     Name: Cecile Bowen  Clinic Number: [id number]     Evaluation Date: 05/16/2018  Visit #: 2/ 20  Authorization period Expiration: 12/31/2018  Plan of Care Expiration: 07/16/2018  Precautions: Standard      Diagnosis:        Encounter Diagnoses   Name Primary?    Chronic bilateral low back pain without sciatica      Weakness generalized        Physician: April Torres MD  Treatment Orders: PT Eval and Treat       Past Medical History:   Diagnosis Date    Back pain      CKD (chronic kidney disease) stage 3, GFR 30-59 ml/min       as per patient    Diabetes type 2, uncontrolled 12/12/2012    DJD (degenerative joint disease) 12/12/2012    Hypertension 12/12/2012    Hypothyroidism 12/12/2012    Levoscoliosis      Migraine      Nuclear sclerosis - Both Eyes 3/24/2014    Obesity 12/12/2012          History of Present Illness: Pt presents to [facility] with complaints of chronic history of LBP. Per pt report, she has been diagnosed with DDD, OA, and scoliosis. Pt recently received RFA one month ago to lumbar spine with little pain relief. Pt mostly non-ambulatory and uses scooter for community distances. Pt uses cane to ambulate within the home. Pt additionally c/o B knee with L>R. Pt with prior history of multiple falls; however, has not had a fall in the past year. Pt with no specific aggravating factors, and reports back will spasm at random.           History   Prior Therapy: Prior therapy years ago for lower extremities   Social History: Lives in [facility] with disabled brother and sister   Previous functional status: Prior to incident, pt independent in all ADLs and physical activity    Current functional status: Able to complete ADLs with additional time; unable to cook or perform housework   Work: Retired      Subjective   Pt reports she is having a hard time today, and her back is really hurting. Pt reports she took 2 pain pills today and 2  "muscle relaxers.     Pain: 7/10 back    Objective     Observation: Pt arrived to therapy amb on scooter. Pt able to transfer and perform all exercises independently    Time In: 12:55 pm  Time Out: 1:40 pm    Cecile received 30 minutes of therapeutic exercises including:   Ball Roll outs fwd 10 x 5" hold (NP)  Lateral ball roll outs x 10 5" hold (NP)  LTR x 10   Glut sets 20 x5" hold   Pelvic tilts 20 x 5" hold   Hooklying hip abd GTB x 10 5" hold  Hooklying add x 10 5"  LAQ x 10  Seated Marches x 10  Seated shoulder rows with OTB x 10  Seated shoulder extension with OTB x 10    (next visit: TA pulldowns, gait training, lateral walking at counter)    Cecile received the following manual therapy techniques: Soft tissue Mobilization were applied to the: lumbar spine for 5 minutes.  STM to lumbar paraspinals in sitting with forward flexion     Pt received MHP x 8 minutes to lumbar spine to promote pain control and relaxation. Pt skin intact following removal of MHP.           Written Home Exercises Provided: Bolded exercises were printed for hep.  Exercises were reviewed and Cecile was able to demonstrate them prior to the end of the session. Pt received a written copy of exercises to perform at home. Cecile demonstrated good  understanding of the education provided.       Assessment   Pt treatment session modified to accommodate for increase in pt symptoms. Pt with TTP along L lumbar paraspinals in L4/5 region. Pt with relief of symptoms following lumbar STM and MHP. New therex not added today due to heightened symptoms. Pt will benefit from gait training with quad cane next treatment session. Will continue to monitor and progress as tolerated. Pt will benefit from skilled outpatient physical therapy to address the above stated deficits, provide pt/family education, and to maximize pt's level of independence.              History  Co-morbidities and personal factors that may impact the plan of care Examination  Body " Structures and Functions, activity limitations and participation restrictions that may impact the plan of care      Clinical Presentation   Co-morbidities:   high BMI and prior lumbar surgery           Personal Factors:   no deficits Body Regions:   back     Body Systems:   ROM  strength  gait  transfers           Participation Restrictions:   Pt unable to perform housework and cooking. Pt unable to ambulate community distances.        Activity limitations:   Learning and applying knowledge  no deficits     General Tasks and Commands  no deficits     Communication  no deficits     Mobility  no deficits     Self care  no deficits     Domestic Life  cooking  doing house work (cleaning house, washing dishes, laundry)  assisting others     Interactions/Relationships  no deficits     Life Areas  no deficits     Community and Social Life  no deficits             stable and uncomplicated                                low   moderate   high Decision Making/ Complexity Score:  low      CMS Impairment/Limitation/Restriction for FOTO Lumbar Spine Survey                          Status             Limitation   Intake              30%                 70%   Predicted        42%                 58%      G-Code                        CMS Severity Modifier   Current Status          CL - At least 60 percent but less than 80 percent  Goal Status+              CK - At least 40 percent but less than 60 percent     Pt's spiritual, cultural and educational needs considered and pt agreeable to plan of care and goals as stated below:      Anticipated Barriers for physical therapy: none      Short Term GOALS:  In 4 weeks, pt will:  Report decreased back pain < / = 6 /10 to increase tolerance for walking long distances  Pt will perform sit to stand transfer from standard height chair with no use of UEs and < or = min A in order to be independent with transfers.  Pt will report > or = 20% decrease in difficulty performing household chores in  order to improve functional mobility.   Pt to tolerate HEP to improve ROM and independence with ADL's        Plan   Continue with POC towards goals.      Adia Foy, PT

## 2018-06-04 RX ORDER — TRAZODONE HYDROCHLORIDE 100 MG/1
TABLET ORAL
Qty: 45 TABLET | Refills: 1 | Status: SHIPPED | OUTPATIENT
Start: 2018-06-04 | End: 2018-11-18 | Stop reason: SDUPTHER

## 2018-06-08 ENCOUNTER — CLINICAL SUPPORT (OUTPATIENT)
Dept: REHABILITATION | Facility: HOSPITAL | Age: 68
End: 2018-06-08
Attending: ANESTHESIOLOGY
Payer: MEDICARE

## 2018-06-08 ENCOUNTER — DOCUMENTATION ONLY (OUTPATIENT)
Dept: REHABILITATION | Facility: HOSPITAL | Age: 68
End: 2018-06-08

## 2018-06-08 DIAGNOSIS — G89.29 CHRONIC BILATERAL LOW BACK PAIN WITHOUT SCIATICA: ICD-10-CM

## 2018-06-08 DIAGNOSIS — R53.1 WEAKNESS GENERALIZED: ICD-10-CM

## 2018-06-08 DIAGNOSIS — M54.50 CHRONIC BILATERAL LOW BACK PAIN WITHOUT SCIATICA: ICD-10-CM

## 2018-06-08 PROCEDURE — 97140 MANUAL THERAPY 1/> REGIONS: CPT | Mod: PO

## 2018-06-08 PROCEDURE — 97110 THERAPEUTIC EXERCISES: CPT | Mod: PO

## 2018-06-08 NOTE — PROGRESS NOTES
OUTPATIENT PHYSICAL THERAPY  PHYSICAL THERAPY PROGRESS NOTE     Name: Cecile Bowen  Clinic Number: [id number]     Evaluation Date: 05/16/2018  Visit #: 4/ 20  Authorization period Expiration: 12/31/2018  Plan of Care Expiration: 07/16/2018  Precautions: Standard      Diagnosis:        Encounter Diagnoses   Name Primary?    Chronic bilateral low back pain without sciatica      Weakness generalized        Physician: April Torres MD  Treatment Orders: PT Eval and Treat       Past Medical History:   Diagnosis Date    Back pain      CKD (chronic kidney disease) stage 3, GFR 30-59 ml/min       as per patient    Diabetes type 2, uncontrolled 12/12/2012    DJD (degenerative joint disease) 12/12/2012    Hypertension 12/12/2012    Hypothyroidism 12/12/2012    Levoscoliosis      Migraine      Nuclear sclerosis - Both Eyes 3/24/2014    Obesity 12/12/2012          History of Present Illness: Pt presents to [facility] with complaints of chronic history of LBP. Per pt report, she has been diagnosed with DDD, OA, and scoliosis. Pt recently received RFA one month ago to lumbar spine with little pain relief. Pt mostly non-ambulatory and uses scooter for community distances. Pt uses cane to ambulate within the home. Pt additionally c/o B knee with L>R. Pt with prior history of multiple falls; however, has not had a fall in the past year. Pt with no specific aggravating factors, and reports back will spasm at random.           History   Prior Therapy: Prior therapy years ago for lower extremities   Social History: Lives in [facility] with disabled brother and sister   Previous functional status: Prior to incident, pt independent in all ADLs and physical activity    Current functional status: Able to complete ADLs with additional time; unable to cook or perform housework   Work: Retired      Subjective     Pt reports she took 1 pain pill and 2 muscle relaxers about 30 minutes before treatment.  Pt reports she sees Pain  "Management Wednesday and the Rheumatologist Thursday.  Pt reports she was having a lot of pain earlier but it has gotten slightly better since the medication.    Pain: 7/10 back    Objective     Observation: Pt arrived to therapy amb on scooter. Pt able to transfer and perform all exercises independently    Time In: 1205 pm  Time Out: 100 pm    Pt received MHP x 5 minutes to lumbar spine to promote pain control and relaxation. Pt continued to lie on heat for 2 of the exercises.  Pt reported a decrease in pain, 5/10, following heat.      Cecile received 41 minutes of therapeutic exercises including:     Ball Roll outs fwd 10 x 5" hold   Lateral ball roll outs x 10 5" hold   LTR x 10   Glut sets 20 x5" hold   Pelvic tilts 20 x 5" hold   Hooklying hip abd GTB x 10 5" hold  Hooklying add x 10 5"  LAQ x 10  Seated Marches x 10  Seated shoulder rows with OTB x 10  Seated shoulder extension with OTB x 10  TA pulldowns with GTB x 10    Pt received 1 on 1 therapeutic exercise for 16 minutes    (next visit: , gait training, lateral walking at counter)    Cecile received the following manual therapy techniques: Soft tissue Mobilization were applied to the: lumbar spine for 9 minutes.  STM to lumbar paraspinals in sitting with forward flexion        Written Home Exercises Provided: none added  Exercises were reviewed and Cecile was able to demonstrate them prior to the end of the session. Pt received a written copy of exercises to perform at home. Cecile demonstrated good  understanding of the education provided.       Assessment     Pt reported decreased pain following moist heat.  Pt reported increased pain and pulling with lateral ball roll outs.  Pt reported a decrease in pain, 5/10, following treatment.  Will progress as tolerated.  Will continue to monitor and progress as tolerated. Pt will benefit from skilled outpatient physical therapy to address the above stated deficits, provide pt/family education, and to maximize " pt's level of independence.              History  Co-morbidities and personal factors that may impact the plan of care Examination  Body Structures and Functions, activity limitations and participation restrictions that may impact the plan of care      Clinical Presentation   Co-morbidities:   high BMI and prior lumbar surgery           Personal Factors:   no deficits Body Regions:   back     Body Systems:   ROM  strength  gait  transfers           Participation Restrictions:   Pt unable to perform housework and cooking. Pt unable to ambulate community distances.        Activity limitations:   Learning and applying knowledge  no deficits     General Tasks and Commands  no deficits     Communication  no deficits     Mobility  no deficits     Self care  no deficits     Domestic Life  cooking  doing house work (cleaning house, washing dishes, laundry)  assisting others     Interactions/Relationships  no deficits     Life Areas  no deficits     Community and Social Life  no deficits             stable and uncomplicated                                low   moderate   high Decision Making/ Complexity Score:  low      CMS Impairment/Limitation/Restriction for FOTO Lumbar Spine Survey                          Status             Limitation   Intake              30%                 70%   Predicted        42%                 58%      G-Code                        CMS Severity Modifier   Current Status          CL - At least 60 percent but less than 80 percent  Goal Status+              CK - At least 40 percent but less than 60 percent     Pt's spiritual, cultural and educational needs considered and pt agreeable to plan of care and goals as stated below:      Anticipated Barriers for physical therapy: none      Short Term GOALS:  In 4 weeks, pt will:  Report decreased back pain < / = 6 /10 to increase tolerance for walking long distances  Pt will perform sit to stand transfer from standard height chair with no use of UEs and <  or = min A in order to be independent with transfers.  Pt will report > or = 20% decrease in difficulty performing household chores in order to improve functional mobility.   Pt to tolerate HEP to improve ROM and independence with ADL's        Plan   Continue with POC towards goals.

## 2018-06-08 NOTE — PROGRESS NOTES
After today's treatment session, patient was in the parking lots and was about to load her scooter into her vehicle.  Per patient, she made a sharp turn while on her scooter and proceeded to fall off, scraping her knee and elbow.  Pt was assisted from the ground by staff, and put into a wheelchair and brought inside.  Pt reported feeling fine and was  Embarrassed.  The elbow was cleaned with wound  and bandaged.  Pt was assisted out of the clinic.  PT will follow up with patient next week.

## 2018-06-14 ENCOUNTER — LAB VISIT (OUTPATIENT)
Dept: LAB | Facility: HOSPITAL | Age: 68
End: 2018-06-14
Attending: INTERNAL MEDICINE
Payer: MEDICARE

## 2018-06-14 ENCOUNTER — OFFICE VISIT (OUTPATIENT)
Dept: RHEUMATOLOGY | Facility: CLINIC | Age: 68
End: 2018-06-14
Payer: MEDICARE

## 2018-06-14 VITALS — WEIGHT: 290 LBS | HEIGHT: 68 IN | BODY MASS INDEX: 43.95 KG/M2

## 2018-06-14 DIAGNOSIS — M15.9 PRIMARY OSTEOARTHRITIS INVOLVING MULTIPLE JOINTS: Primary | ICD-10-CM

## 2018-06-14 DIAGNOSIS — Z79.891 LONG TERM PRESCRIPTION OPIATE USE: ICD-10-CM

## 2018-06-14 DIAGNOSIS — N18.4 CKD (CHRONIC KIDNEY DISEASE) STAGE 4, GFR 15-29 ML/MIN: ICD-10-CM

## 2018-06-14 DIAGNOSIS — M79.7 FIBROMYALGIA: ICD-10-CM

## 2018-06-14 LAB
CREAT UR-MCNC: 172 MG/DL
PROT UR-MCNC: 111 MG/DL
PROT/CREAT RATIO, UR: 0.65

## 2018-06-14 PROCEDURE — 99213 OFFICE O/P EST LOW 20 MIN: CPT | Mod: S$PBB,,, | Performed by: INTERNAL MEDICINE

## 2018-06-14 PROCEDURE — 99999 PR PBB SHADOW E&M-EST. PATIENT-LVL III: CPT | Mod: PBBFAC,,, | Performed by: INTERNAL MEDICINE

## 2018-06-14 PROCEDURE — 84156 ASSAY OF PROTEIN URINE: CPT

## 2018-06-14 PROCEDURE — 99213 OFFICE O/P EST LOW 20 MIN: CPT | Mod: PBBFAC | Performed by: INTERNAL MEDICINE

## 2018-06-14 RX ORDER — HYDROCODONE BITARTRATE AND ACETAMINOPHEN 10; 325 MG/1; MG/1
1 TABLET ORAL EVERY 6 HOURS PRN
Qty: 120 TABLET | Refills: 0 | Status: SHIPPED | OUTPATIENT
Start: 2018-06-14 | End: 2018-06-14 | Stop reason: SDUPTHER

## 2018-06-14 RX ORDER — HYDROCODONE BITARTRATE AND ACETAMINOPHEN 10; 325 MG/1; MG/1
1 TABLET ORAL EVERY 6 HOURS PRN
Qty: 120 TABLET | Refills: 0 | Status: SHIPPED | OUTPATIENT
Start: 2018-07-12 | End: 2018-06-14 | Stop reason: SDUPTHER

## 2018-06-14 RX ORDER — HYDROCODONE BITARTRATE AND ACETAMINOPHEN 10; 325 MG/1; MG/1
1 TABLET ORAL EVERY 6 HOURS PRN
Qty: 120 TABLET | Refills: 0 | Status: SHIPPED | OUTPATIENT
Start: 2018-08-09 | End: 2018-09-13 | Stop reason: SDUPTHER

## 2018-06-15 NOTE — PROGRESS NOTES
History of present illness:  67-year-old female I follow for chronic pain due to osteoarthritis and fibroma bulge of.  She has peripheral neuropathy and neurogenic bladder.  She has had several further Botox injections in the bladder.  She has also had some problems with her back.  She had RFA which helps some.  She is going to therapy.  She fell Friday landing on her right side.  She suffered abrasions on her elbow and knee but no other injuries.  She has had no other recent medical problems.    Physical examination was not performed, the entire time was counseling.    Assessment:  Chronic pain syndrome    Plans:  Narcotic prescription refill for the next 3 months.  Continue other medications as before.  Return to see me in 3 months.

## 2018-06-17 RX ORDER — VENLAFAXINE HYDROCHLORIDE 75 MG/1
CAPSULE, EXTENDED RELEASE ORAL
Qty: 180 CAPSULE | Refills: 1 | Status: SHIPPED | OUTPATIENT
Start: 2018-06-17 | End: 2019-02-04 | Stop reason: SDUPTHER

## 2018-06-18 ENCOUNTER — OFFICE VISIT (OUTPATIENT)
Dept: PAIN MEDICINE | Facility: CLINIC | Age: 68
End: 2018-06-18
Attending: ANESTHESIOLOGY
Payer: MEDICARE

## 2018-06-18 VITALS
TEMPERATURE: 98 F | SYSTOLIC BLOOD PRESSURE: 118 MMHG | HEART RATE: 72 BPM | HEIGHT: 68 IN | DIASTOLIC BLOOD PRESSURE: 78 MMHG | RESPIRATION RATE: 18 BRPM

## 2018-06-18 DIAGNOSIS — E66.01 MORBID OBESITY WITH BMI OF 40.0-44.9, ADULT: ICD-10-CM

## 2018-06-18 DIAGNOSIS — M79.10 MYALGIA: ICD-10-CM

## 2018-06-18 DIAGNOSIS — M62.830 LUMBAR PARASPINAL MUSCLE SPASM: ICD-10-CM

## 2018-06-18 DIAGNOSIS — G89.4 CHRONIC PAIN DISORDER: Primary | ICD-10-CM

## 2018-06-18 DIAGNOSIS — R53.81 PHYSICAL DECONDITIONING: ICD-10-CM

## 2018-06-18 DIAGNOSIS — M47.816 LUMBAR SPONDYLOSIS: ICD-10-CM

## 2018-06-18 PROCEDURE — 99214 OFFICE O/P EST MOD 30 MIN: CPT | Mod: 25,S$PBB,, | Performed by: ANESTHESIOLOGY

## 2018-06-18 PROCEDURE — 20553 NJX 1/MLT TRIGGER POINTS 3/>: CPT | Mod: S$PBB,,, | Performed by: ANESTHESIOLOGY

## 2018-06-18 PROCEDURE — 99213 OFFICE O/P EST LOW 20 MIN: CPT | Mod: PBBFAC,25 | Performed by: ANESTHESIOLOGY

## 2018-06-18 PROCEDURE — 20553 NJX 1/MLT TRIGGER POINTS 3/>: CPT | Mod: PBBFAC | Performed by: ANESTHESIOLOGY

## 2018-06-18 PROCEDURE — 99999 PR PBB SHADOW E&M-EST. PATIENT-LVL III: CPT | Mod: PBBFAC,,, | Performed by: ANESTHESIOLOGY

## 2018-06-18 RX ORDER — BUPIVACAINE HYDROCHLORIDE 5 MG/ML
9 INJECTION, SOLUTION EPIDURAL; INTRACAUDAL
Status: COMPLETED | OUTPATIENT
Start: 2018-06-18 | End: 2018-06-18

## 2018-06-18 RX ORDER — CLONIDINE 100 UG/ML
100 INJECTION, SOLUTION EPIDURAL ONCE
Status: COMPLETED | OUTPATIENT
Start: 2018-06-18 | End: 2018-06-18

## 2018-06-18 RX ADMIN — BUPIVACAINE HYDROCHLORIDE 45 MG: 5 INJECTION, SOLUTION EPIDURAL; INTRACAUDAL; PERINEURAL at 04:06

## 2018-06-18 RX ADMIN — CLONIDINE 100 MCG: 100 INJECTION, SOLUTION EPIDURAL at 04:06

## 2018-06-18 NOTE — PROGRESS NOTES
Subjective:     Patient ID: Cecile Bowen is a 67 y.o. female.    Chief Complaint: Pain    Consulted by: Dr. Enrique Henao     Disclaimer: This note was generated using voice recognition software.  There may be a typographical errors that were missed during proofreading.      HPI:    Cecile Bowen is a 67 y.o. female who presents today with low back pain. Her back pain has been present for 20+ years.  She has been diagnosed with degenerative disc disease (20 years ago), levoscoliosis (5 years ago), OA of the spine.  Her pain has been worsening over time, worse in the last decade, and severe for the past few years.  The worst of her pain is in her low back. The pain is equal bilaterally and does not radiate.  No associated numbness, tingling, or muscle weakness.  She has noticed that she has lost some muscle tone.  This pain is described in detail below.    She was medically screened by the [facility] and found to not be a current candidate.      She has recently had x-rays of her thoracic and lumbar spine that we will review today.    Interval History (3/12/2018)  Patient returns today s/p bilateral MBB L1,2,3 and left genicular nerve blocks. She reports at least 70% reduction in knee pain, but minimal (15%, at most) reduction in pain in the lower back. Her pain is similar location and distribution as previous, but slightly higher intensity and worsening spasms. Patient denies bowel/bladder incontinence or saddle anesthesia. Currently taking Robaxin 1500 TID and norco 10/325 up to 4 pills per day, both of which are helpful.     Interval History (5/2/2018):  She returns today for follow up.  She reports that the lower RFA, right then left.  Her pain is now higher up.  She does not want to do any more injections at this time.  Her medication regimen is helpful.  Her left knee is tolerable.    Interval History (6/18/2018):  She returns today for follow up.  She reports that the RFA continues to be helpful for her pain.   She has lost 20 lb.  She is enjoying physical therapy.  She has not been back since 06/08/2018 when she suffered a fall out of her power wheelchair when leaving the facility.  She reports that she does not have any lasting injuries from this other than a scrape on her right elbow and right knee.  She does reports of increased muscle pain in her low back that is making it difficult to relax    Physical Therapy:  06/18/2018:  Currently attending (no current follow-up visits scheduled at this time) Yes, at [place], most recent was 2 visits in 2014.  She did a course for a couple of months before that    Non-pharmacologic Treatment:     · Ice/Heat: Heat helps  · TENS: Not tried  · Massage: Not tried  · Chiropractic care: Not tried  · Acupuncture: Not tried  · Other: Use a scooter. Has a recliner lift. No other assistive devices         Pain Medications:         · Currently taking: Norco 10/325 mg BID-QID PRN, Topamax, Robaxin 1500 mg TID, Excedrin migraine, Imitrex, topamax, Effexor, Biofreeze    · Has tried in the past:  Flexeril, Baclofen, gabapentin, memantine, amitriptyline, Zoloft, Lidoderm patches    · Has not tried: NSAIDs, Tylenol, Lyrica, Rx topical creams    Blood thinners: None    Interventional Therapies:   · 2/2018: bilateral MBB L1,2,3 (negative 15% pain relief) and left genicular nerve blocks (positive, 70% pain relief)  · 3/2018: Bilateral L2-5 MBB: Positive  · 4/2/2018: Right L2-5 RFA: 20% better  · 4/20/2018: Left L2-5: 20% better    Relevant Surgeries: None    Affecting sleep? Yes, she is getting about 2-3 hours at a time    Affecting daily activities? Yes    Depressive symptoms? Treated for depression          · SI/HI? No    Work status: Retired , 3 rd grade.  Retired due to pain    Prescription Monitoring Program database:  Reviewed and consistent with medication use as prescribed.    Pain Scales  Best: 4/10  Worst: 10/10  Usually: 6/10  Today: 7/10    Back Pain   This is a chronic  problem. The current episode started more than 1 year ago. The problem occurs constantly. The problem has been rapidly worsening since onset. The pain is present in the lumbar spine, sacro-iliac and thoracic spine. The pain does not radiate (having seperate pains in Bilateral Hip and Knees). The pain is at a severity of 7/10. The pain is moderate. The pain is the same all the time. The symptoms are aggravated by standing, bending and coughing (walking, lifting and getting up from a sitting position). Associated symptoms include headaches. Pertinent negatives include no chest pain, fever or weight loss. She has tried NSAIDs, muscle relaxant, injection treatment and heat (injection treatments in knees) for the symptoms. Physical therapy was effective and ineffective.      Last 3 PDI Scores 6/18/2018 5/2/2018 3/12/2018   Pain Disability Index (PDI) [phone]       Review of Systems   Constitution: Negative for chills, fever, malaise/fatigue, weight gain and weight loss.   HENT: Negative for ear pain and hoarse voice.    Eyes: Negative for blurred vision, pain and visual disturbance.   Cardiovascular: Negative for chest pain, dyspnea on exertion and irregular heartbeat.   Respiratory: Negative for cough, shortness of breath and wheezing.    Endocrine: Negative for cold intolerance and heat intolerance.   Hematologic/Lymphatic: Negative for adenopathy and bleeding problem. Does not bruise/bleed easily.   Skin: Negative for color change, itching and rash.   Musculoskeletal: Positive for back pain, joint pain, muscle cramps and stiffness.   Gastrointestinal: Negative for change in bowel habit, diarrhea, hematemesis, hematochezia, melena and vomiting.   Genitourinary: Negative for flank pain, frequency, hematuria and urgency.   Neurological: Positive for headaches and loss of balance. Negative for difficulty with concentration, dizziness and seizures.   Psychiatric/Behavioral: Negative for altered mental status, depression  "and suicidal ideas. The patient is not nervous/anxious.    Allergic/Immunologic: Negative for HIV exposure.             Past Medical History:   Diagnosis Date    Back pain     CKD (chronic kidney disease) stage 3, GFR 30-59 ml/min     as per patient    Diabetes type 2, uncontrolled 12/12/2012    DJD (degenerative joint disease) 12/12/2012    Hypertension 12/12/2012    Hypothyroidism 12/12/2012    Levoscoliosis     Migraine     Nuclear sclerosis - Both Eyes 3/24/2014    Obesity 12/12/2012       Past Surgical History:   Procedure Laterality Date    BREAST BIOPSY Right     benign    CHOLECYSTECTOMY      COLONOSCOPY N/A 1/13/2017    Procedure: COLONOSCOPY;  Surgeon: Morris Wiseman MD;  Location: [address]);  Service: Endoscopy;  Laterality: N/A;  Renal pt Nephrology advised to avoid phosphate preps    DILATION AND CURETTAGE OF UTERUS      GALLBLADDER SURGERY  2006    OVARIAN CYST SURGERY  1985    spt placement      TONSILLECTOMY, ADENOIDECTOMY      TOTAL ABDOMINAL HYSTERECTOMY W/ BILATERAL SALPINGOOPHORECTOMY  1985       Review of patient's allergies indicates:  No Known Allergies    Current Outpatient Prescriptions   Medication Sig Dispense Refill    aspirin-acetaminophen-caffeine 250-250-65 mg (EXCEDRIN MIGRAINE) 250-250-65 mg per tablet Take 1 tablet by mouth every 6 (six) hours as needed for Pain.      BD INSULIN PEN NEEDLE UF SHORT 31 gauge x 5/16" Ndle USE ONCE DAILY WITH LANTUS SOLOSTAR PEN INSULIN 150 each 3    BD INSULIN SYRINGE ULTRA-FINE 1/2 mL 31 gauge x 15/64" Syrg USE WITH INSULIN 4 TIMES A  Syringe 4    blood sugar diagnostic Strp ONE TOUCH ULTRA TEST STRIPS//Check blood sugars  strip 6    blood-glucose meter kit ONE TOUCH ULTRA 1 each 0    cloNIDine (CATAPRES) 0.1 MG tablet TAKE 1 TABLET BY MOUTH EVERY 8 HOURS AS NEEDED FOR SYSTOLIC BLOOD PRESSURE GREATER THAN 170 90 tablet 3    cyanocobalamin, vitamin B-12, (VITAMIN B-12) 5,000 mcg Subl Place 1 tablet " under the tongue once daily.      ergocalciferol (VITAMIN D2) 50,000 unit Cap TAKE 1 CAPSULE (50,000 UNITS TOTAL) BY MOUTH EVERY 7 DAYS. 4 capsule 4    ferrous sulfate 325 (65 FE) MG EC tablet Take 325 mg by mouth once daily.       gemfibrozil (LOPID) 600 MG tablet Patient takes one tablet every other day 60 tablet 3    [START ON 8/9/2018] HYDROcodone-acetaminophen (NORCO)  mg per tablet Take 1 tablet by mouth every 6 (six) hours as needed. 120 tablet 0    insulin lispro (HUMALOG) 100 unit/mL injection Inject under the skin 7 units in the AM, 10 units for lunch, 12 units for Dinner , before meals 10 mL 11    lancets (ONE TOUCH DELICA LANCETS) Misc Ultra 2 lancets to check blood sugar  each 6    LANTUS SOLOSTAR 100 unit/mL (3 mL) InPn pen INJECT 19 UNITS INTO THE SKIN EVERY EVENING. 15 Syringe 3    LANTUS SOLOSTAR U-100 INSULIN 100 unit/mL (3 mL) InPn pen INJECT 19 UNITS INTO THE SKIN EVERY EVENING. 15 Syringe 3    levothyroxine (SYNTHROID) 50 MCG tablet Take 1 tablet (50 mcg total) by mouth once daily. 30 tablet 11    magnesium oxide (MAG-OX) 400 mg tablet TAKE 1 TABLET (400 MG TOTAL) BY MOUTH 2 (TWO) TIMES DAILY. 180 tablet 3    methocarbamol (ROBAXIN) 750 MG Tab TAKE 1-2 TABLETS BY MOUTH 4 TIMES DAILY AS NEEDED 180 tablet 2    multivitamin with minerals tablet Take 1 tablet by mouth once daily.      oxybutynin (DITROPAN-XL) 10 MG 24 hr tablet Take 10 mg by mouth once daily.  9    pravastatin (PRAVACHOL) 40 MG tablet TAKE 1 TABLET BY MOUTH EVERY DAY 30 tablet 5    riboflavin, vitamin B2, 400 mg Tab Take 400 mg by mouth once daily. 90 tablet 3    scopolamine (TRANSDERM-SCOP) 1.3-1.5 mg (1 mg over 3 days) Place 1 patch onto the skin every 72 hours. 4 patch 0    sodium bicarbonate 650 MG tablet TAKE 1 TABLET (650 MG TOTAL) BY MOUTH 3 (THREE) TIMES DAILY. 270 tablet 3    sumatriptan (IMITREX) 100 MG tablet Take 1 tablet (100 mg total) by mouth 2 (two) times daily as needed for Migraine. 9  "tablet 11    sumatriptan (IMITREX) 100 MG tablet TAKE 1 TABLET (100 MG TOTAL) BY MOUTH 2 (TWO) TIMES DAILY AS NEEDED FOR MIGRAINE. 9 tablet 6    topiramate (TOPAMAX) 200 MG Tab Take 1 tablet (200 mg total) by mouth 2 (two) times daily. 180 tablet 3    traZODone (DESYREL) 100 MG tablet TAKE 1 TABLET BY MOUTH AT BEDTIME MAY TAKE AN EXTRA HALF TABLET IF NEEDED 45 tablet 1    venlafaxine (EFFEXOR-XR) 75 MG 24 hr capsule TAKE 2 CAPSULES BY MOUTH ONCE DAILY. 180 capsule 1     No current facility-administered medications for this visit.        Family History   Problem Relation Age of Onset    Diabetes Sister     Kidney disease Sister         CKD III    ALS Mother         d.    Cancer Maternal Grandmother         d. colon    Cancer Paternal Grandfather         d. lung    Scoliosis Brother         increased pain    Prostate cancer Brother     Diabetes Maternal Aunt     Kidney disease Maternal Aunt     Diabetes Maternal Uncle     Amblyopia Neg Hx     Blindness Neg Hx     Cataracts Neg Hx     Glaucoma Neg Hx     Macular degeneration Neg Hx     Retinal detachment Neg Hx     Strabismus Neg Hx        Social History     Social History    Marital status:      Spouse name: N/A    Number of children: N/A    Years of education: N/A     Occupational History    Not on file.     Social History Main Topics    Smoking status: Never Smoker    Smokeless tobacco: Never Used    Alcohol use No    Drug use: No    Sexual activity: Not Currently     Birth control/ protection: Abstinence     Other Topics Concern    Not on file     Social History Narrative    Single        Lives w/ sister  And brother     both are helping her during her post hosp recovery period       Objective:     Vitals:    06/18/18 1538   BP: 118/78   Pulse: 72   Resp: 18   Temp: 97.7 °F (36.5 °C)   TempSrc: Oral   Height: 5' 8" (1.727 m)   PainSc:   7       GEN:  Well developed, well nourished.  No acute distress.   HEENT:  No trauma.  " Mucous membranes moist.  Nares patent bilaterally.  PSYCH: Normal affect. Thought content appropriate.  CHEST:  Breathing symmetric.  No audible wheezing.  ABD: Soft, non-distended.  SKIN:  Warm, pink, dry.  No rash on exposed areas.    EXT:  No cyanosis, clubbing, or edema.  No color change or changes in nail or hair growth.  NEURO/MUSCULOSKELETAL:  Fully alert, oriented, and appropriate. Speech normal sen. No cranial nerve deficits.   Gait: Antalgic, cannot ambulate without assistance.  Present trendelenburg sign bilaterally.   Motor Strength: 5/5 motor strength throughout lower extremities.   Sensory: No sensory deficit in the lower extremities.   L-Spine:  Markedly decreased ROM with pain on extension, lateral rotation.  Minimal facet loading bilaterally in lower lumbar spine.    Discrete trigger points palpated in bilateral lower lumbar paraspinals and right upper lumbar paraspinal.        Imaging:        The imaging studies listed below were independently reviewed by me, and I agree with the findings as documented below.     Narrative     Two views of the lumbar spine submitted.      Moderate degenerative change seen in the lumbar spine with multilevel interspace narrowing and vacuum disks.  Vertebral body heights are fairly well maintained.  Mild levoscoliosis seen.  Lateral alignment is reasonably good.   Impression      As above.      Electronically signed by: Real Rasmussen MD  Date: 12/05/14  Time: 10:47     Narrative     5 views: There is moderate DJD at L2-L3, mild elsewhere. Alignment is normal. No fracture dislocation bone destruction or pars defect seen.   Impression      DJD.      Electronically signed by: ANANT LICONA MD  Date: 01/31/18  Time: 12:30      Narrative     2 views of the thoracic spine were obtained, with AP and lateral projections submitted.  Comparison is made to a thoracic CT examination of 2/9/17.    No significant alignment abnormality.  Vertebral body heights are adequately  maintained, without significant compression deformity at any level.  Note is made of disc narrowing and vacuum phenomena indicative of disc desiccation at both T7-8 and T8-9, with minimal marginal vertebral endplate spurring at these levels, findings which can also be appreciated on the thoracic CT examination of 2/9/17.  Vertebral endplates are well-defined.  No osseous destructive process or paraspinal soft tissue mass.  Right upper quadrant surgical clips are incidentally observed.   Impression      Disc narrowing, minimal marginal vertebral endplate spurring, and vacuum phenomena indicative of disc desiccation are again seen at both the T7-8 and T8-9 levels.  No evidence of compression fracture or other detrimental change since the CT exam of 2/9/17 is appreciated.      Electronically signed by: Nithin Valadez MD  Date: 01/31/18  Time: 12:37          Assessment:     Encounter Diagnoses   Name Primary?    Chronic pain disorder Yes    Lumbar spondylosis     Myalgia     Morbid obesity with BMI of 40.0-44.9, adult     Lumbar paraspinal muscle spasm     Physical deconditioning        Plan:     Cecile was seen today for follow-up.    Diagnoses and all orders for this visit:    Chronic pain disorder    Lumbar spondylosis    Myalgia  -     bupivacaine (PF) injection 45 mg; Inject 9 mLs (45 mg total) into the muscle one time.  -     cloNIDine injection 100 mcg; 1 mL (100 mcg total) by Epidural route once.    Morbid obesity with BMI of 40.0-44.9, adult    Lumbar paraspinal muscle spasm    Physical deconditioning       Her pain is consistent with the above.    We discussed the assessment and recommendations.  All available images were reviewed. We discussed the disease process, prognosis, treatment plan, and risks and benefits. The patient is aware of the risks and benefits of the medications being prescribed, common side effects, and proper usage. The following is the plan we agreed on:     1. Trigger point injections  today as below  2. Can repeat L2,L3,L4,L5 RFA PRN  3. Left knee genicular nerve blocks with good response, but patient does not want knee RFA at this time.   4. I ordered her to contact physical therapy to get rescheduled for her sessions  5. TENS unit info provided to patient at [facility]  6. Patient also asked about Quell Machine. This was discussed, and it does not appear this would be covered by insurance.   7. RTC in 6 weeks or sooner if needed.    Trigger Point Injection:   The procedure was discussed with the patient including complications of damage, bleeding, infection, and failure of pain relief.     All medications, allergies, and relevant histories were reviewed. No recent antibiotics or infections.  A time-out was taken to verify the correct patient, procedure, laterality, and appropriate medications/allergies.    Trigger points were identified by palpation and marked. CHG prep of sites done. A 27-gauge needle was advanced to the point of maximal tenderness, and 3.33 mL of a mixture of 0.5% bupivacaine with clonidine 100 mcg mg was injected after negative aspiration in a fan-like distribution. All sites done in the same manner. Patient tolerated the procedure well and without complications. Sites injected included:  Bilateral lumbar paraspinals (1 left lower, 1 right lower, 1 right upper)     The patient tolerated the procedure well and was discharged in excellent condition.      The above plan and management options were discussed at length with patient. Patient is in agreement with the above and verbalized understanding. It will be communicated with the referring physician via electronic record, fax, or mail.

## 2018-06-19 ENCOUNTER — OFFICE VISIT (OUTPATIENT)
Dept: UROLOGY | Facility: CLINIC | Age: 68
End: 2018-06-19
Payer: MEDICARE

## 2018-06-19 VITALS
SYSTOLIC BLOOD PRESSURE: 140 MMHG | HEART RATE: 105 BPM | HEIGHT: 68 IN | BODY MASS INDEX: 43.93 KG/M2 | WEIGHT: 289.88 LBS | DIASTOLIC BLOOD PRESSURE: 88 MMHG

## 2018-06-19 DIAGNOSIS — Z43.5 ENCOUNTER FOR CARE OR REPLACEMENT OF SUPRAPUBIC TUBE: ICD-10-CM

## 2018-06-19 DIAGNOSIS — Z93.59 CHRONIC SUPRAPUBIC CATHETER: ICD-10-CM

## 2018-06-19 DIAGNOSIS — L92.9 GRANULATION TISSUE: ICD-10-CM

## 2018-06-19 DIAGNOSIS — N31.9 NEUROGENIC BLADDER: Primary | ICD-10-CM

## 2018-06-19 PROCEDURE — 51705 CHANGE OF BLADDER TUBE: CPT | Mod: PBBFAC | Performed by: NURSE PRACTITIONER

## 2018-06-19 PROCEDURE — 17250 CHEM CAUT OF GRANLTJ TISSUE: CPT | Mod: PBBFAC | Performed by: NURSE PRACTITIONER

## 2018-06-19 PROCEDURE — 99999 PR PBB SHADOW E&M-EST. PATIENT-LVL V: CPT | Mod: PBBFAC,,, | Performed by: NURSE PRACTITIONER

## 2018-06-19 PROCEDURE — 99214 OFFICE O/P EST MOD 30 MIN: CPT | Mod: S$PBB,25,, | Performed by: NURSE PRACTITIONER

## 2018-06-19 PROCEDURE — 99215 OFFICE O/P EST HI 40 MIN: CPT | Mod: PBBFAC,25 | Performed by: NURSE PRACTITIONER

## 2018-06-19 PROCEDURE — 51705 CHANGE OF BLADDER TUBE: CPT | Mod: S$PBB,,, | Performed by: NURSE PRACTITIONER

## 2018-06-19 PROCEDURE — 17250 CHEM CAUT OF GRANLTJ TISSUE: CPT | Mod: S$PBB,51,, | Performed by: NURSE PRACTITIONER

## 2018-06-19 NOTE — PROGRESS NOTES
Subjective:       Patient ID: Cecile Bowen is a 67 y.o. female.    Chief Complaint: neurogenic bladder (s/p tube change)    Cecile Bowen is a 67 y.o. Female with history of bilateral hydronephrosis, incomplete bladder emptying which is managed by SPT (16fr).  Her last SPT change was 05/03/2018.       05/14/2018 Procedure(s) Performed:   1.  Cystoscopy with bladder botox injection (300u)  2.  Suprapubic catheter change.     Here today for scheduled SPT change.   SPT is draining well.  Bladder spasm are greatly reduced; pleased with botox.    No fever, n/v              Past Medical History:    Diabetes type 2, uncontrolled                   12/12/2012    Obesity                                         12/12/2012    Hypertension                                    12/12/2012    DJD (degenerative joint disease)                12/12/2012    Hypothyroidism                                  12/12/2012    Nuclear sclerosis - Both Eyes                   3/24/2014     Migraine                                                      Past Surgical History:    TOTAL ABDOMINAL HYSTERECTOMY W/ BILATERAL SALP*  1985          GALLBLADDER SURGERY                              2006          TONSILLECTOMY, ADENOIDECTOMY                                   OVARIAN CYST SURGERY                             1985          HYSTERECTOMY                                                   DILATION AND CURETTAGE OF UTERUS                               Review of patient's family history indicates:    Diabetes                       Sister                    Kidney disease                 Sister                    ALS                            Mother                      Comment: d.    Cancer                         Maternal Grandmother        Comment: d. colon    Cancer                         Paternal Grandfather        Comment: d. lung    Diabetes                       Maternal Aunt             Kidney disease                 Maternal Aunt          "    Diabetes                       Maternal Uncle            Amblyopia                      Neg Hx                    Blindness                      Neg Hx                    Cataracts                      Neg Hx                    Glaucoma                       Neg Hx                    Macular degeneration           Neg Hx                    Retinal detachment             Neg Hx                    Strabismus                     Neg Hx                      Social History    Marital Status:             Spouse Name:                       Years of Education:                 Number of children:               Occupational History    None on file    Social History Main Topics    Smoking Status: Never Smoker                      Smokeless Status: Never Used                        Alcohol Use: No              Drug Use: No              Sexual Activity: Not Currently           Birth Control/Protection: Abstinence    Other Topics            Concern    None on file    Social History Narrative    Single      Lives w/ sister  And brother     both are helping her during her post hosp recovery period        Allergies:  Review of patient's allergies indicates no known allergies.    Medications:  Current outpatient prescriptions:amitriptyline (ELAVIL) 10 MG tablet, TAKE 3 TABLETS BY MOUTH IN THE EVENING, Disp: 90 tablet, Rfl: 5;  aspirin (ECOTRIN) 81 MG EC tablet, Take 81 mg by mouth once daily., Disp: , Rfl: ;  BD INSULIN PEN NEEDLE UF SHORT 31 X 5/16 " Ndle, USE ONCE DAILY WITH LANTUS SOLOSTAR PEN INSULIN, Disp: 100 each, Rfl: 11  butalbital-acetaminophen-caffeine -40 mg (FIORICET, ESGIC) -40 mg per tablet, TAKE 1 TABLET EVERY 4 TO 6 HOURS, Disp: 30 tablet, Rfl: 0;  cyanocobalamin, vitamin B-12, (VITAMIN B-12) 2,500 mcg Subl, Place 2,500 mcg under the tongue once daily., Disp: , Rfl: ;  diphenhydrAMINE (BENADRYL) 25 mg capsule, Take 25 mg by mouth every 6 (six) hours as needed., Disp: , Rfl:   ferrous sulfate " "325 (65 FE) MG EC tablet, Take 325 mg by mouth 3 (three) times daily with meals., Disp: , Rfl: ;  fluticasone (FLONASE) 50 mcg/actuation nasal spray, 1 SPRAY IN EACH NOSTRIL DAILY (Patient taking differently: 1 SPRAY IN EACH NOSTRIL DAILY PRN), Disp: 16 g, Rfl: 1;  furosemide (LASIX) 20 MG tablet, Take 1 tablet (20 mg total) by mouth once daily., Disp: 4 tablet, Rfl: 0  gemfibrozil (LOPID) 600 MG tablet, TAKE 1 TABLET BY MOUTH TWICE A DAY, Disp: 60 tablet, Rfl: 5;  (START ON 4/29/2015) hydrocodone-acetaminophen 10-325mg (NORCO)  mg Tab, Take 1 tablet by mouth every 6 (six) hours as needed., Disp: 120 tablet, Rfl: 0;  insulin lispro (HUMALOG) 100 unit/mL injection, Inject 7 Units into the skin 3 (three) times daily before meals., Disp: 6.3 mL, Rfl: 11  insulin syringe-needle U-100 (BD INSULIN SYRINGE ULTRA-FINE) 1/2 mL 31 x 15/64" Syrg, 1 Box by Misc.(Non-Drug; Combo Route) route 4 (four) times daily., Disp: 100 Syringe, Rfl: 12;  lancets (ONE TOUCH DELICA LANCETS) Misc, Ultra 2 lancets to check blood sugar BID, Disp: 100 each, Rfl: 6;  LANTUS SOLOSTAR 100 unit/mL (3 mL) InPn pen, , Disp: , Rfl: 3  LIDODERM 5 %(700 mg/patch), APPLY 1 PATCH TO SKIN DAILY (LEAVING ON FOR 12 HOURS AND OFF FOR 12 HOURS), Disp: 30 patch, Rfl: 6;  magnesium oxide (MAG-OX) 400 mg tablet, TAKE 1 TABLET (400 MG TOTAL) BY MOUTH 2 (TWO) TIMES DAILY., Disp: 60 tablet, Rfl: 11;  methocarbamol (ROBAXIN) 750 MG Tab, TAKE 1 TO 2 TABLETS BY MOUTH 3 TIMES A DAY (Patient taking differently: Take 2 tablets twice daily), Disp: 120 tablet, Rfl: 3  methocarbamol (ROBAXIN) 750 MG Tab, TAKE 1 TO 2 TABLETS BY MOUTH 3 TIMES A DAY, Disp: 120 tablet, Rfl: 3;  methylPREDNISolone (MEDROL DOSEPACK) 4 mg tablet, use as directed, Disp: 21 tablet, Rfl: 0;  multivitamin with minerals tablet, Take 1 tablet by mouth once daily., Disp: , Rfl: ;  pravastatin (PRAVACHOL) 40 MG tablet, TAKE 1 TABLET BY MOUTH EVERY DAY, Disp: 30 tablet, Rfl: 2  pyridoxine (B-6) 100 MG " Tab, Take 1 tablet (100 mg total) by mouth once daily., Disp: 30 tablet, Rfl: 12;  scopolamine (TRANSDERM-SCOP) 1.5 mg, Place 1 patch (1.5 mg total) onto the skin every 72 hours., Disp: 4 patch, Rfl: 0;  sulfamethoxazole-trimethoprim 800-160mg (BACTRIM DS) 800-160 mg Tab, Take 1 tablet by mouth 2 (two) times daily., Disp: , Rfl: 0  sumatriptan (IMITREX) 100 MG tablet, TAKE 1 TABLET (100 MG TOTAL) BY MOUTH ONCE., Disp: 9 tablet, Rfl: 6;  SYNTHROID 125 mcg tablet, TAKE 1 TABLET BY MOUTH DAILY, Disp: 90 tablet, Rfl: 4;  topiramate (TOPAMAX) 100 MG tablet, TAKE 1 TABLET (100 MG TOTAL) BY MOUTH 2 (TWO) TIMES DAILY., Disp: 60 tablet, Rfl: 11;  topiramate (TOPAMAX) 25 MG tablet, Take 4 tablets (100 mg total) by mouth 2 (two) times daily., Disp: 240 tablet, Rfl: 5  venlafaxine (EFFEXOR-XR) 150 MG Cp24, TAKE 1 CAPSULE BY MOUTH ONCE DAILY, Disp: 30 capsule, Rfl: 2;  vitamin D (VITAMIN D3) 185 MG Tab, Take 185 mg by mouth once daily., Disp: , Rfl:           Review of Systems   Constitutional: Negative.  Negative for chills and fever.   HENT: Negative for facial swelling and trouble swallowing.    Eyes: Negative for visual disturbance.   Respiratory: Negative for cough, chest tightness, shortness of breath and wheezing.    Cardiovascular: Negative for chest pain and palpitations.   Gastrointestinal: Negative for abdominal pain, constipation, nausea and vomiting.   Genitourinary: Positive for difficulty urinating. Negative for dysuria, hematuria, urgency and vaginal bleeding.        SPT draining well     Musculoskeletal: Negative for gait problem.   Skin: Negative for rash.   Neurological: Positive for headaches. Negative for dizziness.   Hematological: Does not bruise/bleed easily.   Psychiatric/Behavioral: Negative for behavioral problems.       Objective:      Physical Exam   Nursing note and vitals reviewed.  Constitutional: She is oriented to person, place, and time. Vital signs are normal. She appears well-developed and  well-nourished. She is active and cooperative.   HENT:   Head: Normocephalic.   Right Ear: External ear normal.   Left Ear: External ear normal.   Nose: Nose normal.   Eyes: Conjunctivae and lids are normal. Right eye exhibits no discharge. Left eye exhibits no discharge. No scleral icterus.   Neck: Trachea normal and normal range of motion. Neck supple. No tracheal deviation present.   Cardiovascular: Normal rate, regular rhythm and intact distal pulses.    Pulmonary/Chest: Effort normal. No respiratory distress.   Abdominal: Soft. Normal appearance and bowel sounds are normal. She exhibits no distension and no mass. There is no tenderness. There is no rebound and no guarding.       Musculoskeletal: Normal range of motion. She exhibits no edema.   Neurological: She is alert and oriented to person, place, and time.   Skin: Skin is warm, dry and intact.     Psychiatric: She has a normal mood and affect. Her behavior is normal. Judgment and thought content normal.       Assessment:       1. Neurogenic bladder    2. Chronic suprapubic catheter    3. Encounter for care or replacement of suprapubic tube        Plan:           I spent 25 minutes with the patient of which more than half was spent in direct consultation with the patient in regards to our treatment and plan.    Education and recommendations of today's plan of care including home remedies.  We discussed daily care; keeping surrounding tissue clean and dry.  I removed old SPT without difficulty.  I placed a new 16fr SPT easily using sterile technique.  Irrigated to verify the position.  Balloon inflated with 9cc sterile water; plug placed.  Silver Nitrate sticks x 3 to granulation tissue.  Emphasized importance of good skin care around SPT.  Will contact regarding urine results prior to BOTOX   dsg applied to site.

## 2018-06-20 ENCOUNTER — OFFICE VISIT (OUTPATIENT)
Dept: NEPHROLOGY | Facility: CLINIC | Age: 68
End: 2018-06-20
Payer: MEDICARE

## 2018-06-20 ENCOUNTER — PATIENT MESSAGE (OUTPATIENT)
Dept: NEPHROLOGY | Facility: CLINIC | Age: 68
End: 2018-06-20

## 2018-06-20 VITALS
SYSTOLIC BLOOD PRESSURE: 128 MMHG | BODY MASS INDEX: 45.25 KG/M2 | DIASTOLIC BLOOD PRESSURE: 70 MMHG | WEIGHT: 293 LBS | HEART RATE: 96 BPM | OXYGEN SATURATION: 97 %

## 2018-06-20 DIAGNOSIS — E87.20 METABOLIC ACIDOSIS: ICD-10-CM

## 2018-06-20 DIAGNOSIS — Z74.09 MOBILITY IMPAIRED: ICD-10-CM

## 2018-06-20 DIAGNOSIS — N25.81 SECONDARY HYPERPARATHYROIDISM, RENAL: ICD-10-CM

## 2018-06-20 DIAGNOSIS — G47.33 OSA (OBSTRUCTIVE SLEEP APNEA): ICD-10-CM

## 2018-06-20 DIAGNOSIS — E66.01 MORBID OBESITY DUE TO EXCESS CALORIES: ICD-10-CM

## 2018-06-20 DIAGNOSIS — E55.9 VITAMIN D DEFICIENCY DISEASE: ICD-10-CM

## 2018-06-20 DIAGNOSIS — N31.9 NEUROGENIC BLADDER: ICD-10-CM

## 2018-06-20 DIAGNOSIS — N18.4 CKD (CHRONIC KIDNEY DISEASE) STAGE 4, GFR 15-29 ML/MIN: Primary | ICD-10-CM

## 2018-06-20 DIAGNOSIS — M79.7 FIBROMYALGIA: ICD-10-CM

## 2018-06-20 DIAGNOSIS — N18.4 CKD (CHRONIC KIDNEY DISEASE), STAGE IV: ICD-10-CM

## 2018-06-20 DIAGNOSIS — D50.9 IRON DEFICIENCY ANEMIA, UNSPECIFIED IRON DEFICIENCY ANEMIA TYPE: ICD-10-CM

## 2018-06-20 DIAGNOSIS — D64.3: ICD-10-CM

## 2018-06-20 DIAGNOSIS — I10 ESSENTIAL HYPERTENSION: ICD-10-CM

## 2018-06-20 PROCEDURE — 99213 OFFICE O/P EST LOW 20 MIN: CPT | Mod: PBBFAC,PO | Performed by: INTERNAL MEDICINE

## 2018-06-20 PROCEDURE — 99999 PR PBB SHADOW E&M-EST. PATIENT-LVL III: CPT | Mod: PBBFAC,,, | Performed by: INTERNAL MEDICINE

## 2018-06-20 PROCEDURE — 99214 OFFICE O/P EST MOD 30 MIN: CPT | Mod: S$PBB,,, | Performed by: INTERNAL MEDICINE

## 2018-06-20 RX ORDER — CLONIDINE HYDROCHLORIDE 0.1 MG/1
0.1 TABLET ORAL ONCE
Qty: 30 TABLET | Refills: 3
Start: 2018-06-20 | End: 2019-01-30 | Stop reason: SDUPTHER

## 2018-06-21 ENCOUNTER — PATIENT MESSAGE (OUTPATIENT)
Dept: ENDOCRINOLOGY | Facility: CLINIC | Age: 68
End: 2018-06-21

## 2018-06-22 ENCOUNTER — TELEPHONE (OUTPATIENT)
Dept: ENDOCRINOLOGY | Facility: CLINIC | Age: 68
End: 2018-06-22

## 2018-06-22 NOTE — TELEPHONE ENCOUNTER
Previously seen my me 2016  Ms. Dwyer 5/2017    Needs f/u   Please schedule labs and fu with endo or dm NP

## 2018-06-25 RX ORDER — METHOCARBAMOL 750 MG/1
TABLET, FILM COATED ORAL
Qty: 180 TABLET | Refills: 2 | Status: SHIPPED | OUTPATIENT
Start: 2018-06-25 | End: 2018-08-28 | Stop reason: SDUPTHER

## 2018-06-30 DIAGNOSIS — R39.15 URINARY URGENCY: ICD-10-CM

## 2018-07-01 NOTE — PROGRESS NOTES
Subjective:       Patient ID: Cecile Bowen is a 67 y.o. White female who presents for follow-up evaluation of Follow-up and Chronic Kidney Disease    HPI     She reports she is doing well.  Her mobility remains poor and she continues to use a scooter. Her last Hba1c was 7.3  but she thinks it will improve for next time given her most recent home levels. No new medications. Her HAs are at bay. No LUTS    Review of Systems   Constitutional: Negative for activity change, appetite change, fatigue and fever.   HENT: Negative for facial swelling.    Eyes: Negative for visual disturbance.   Respiratory: Negative for shortness of breath.    Cardiovascular: Positive for leg swelling. Negative for chest pain.   Gastrointestinal: Negative for constipation and diarrhea.   Genitourinary: Negative for difficulty urinating, dysuria and hematuria.   Musculoskeletal: Positive for arthralgias and back pain.   Neurological: Negative for weakness and headaches.   Psychiatric/Behavioral: Negative for sleep disturbance.       Objective:      Physical Exam   Constitutional: She is oriented to person, place, and time. She appears well-nourished.   HENT:   Mouth/Throat: Oropharynx is clear and moist.   Neck: No JVD present.   Cardiovascular: S1 normal and S2 normal.  Exam reveals no friction rub.    Pulmonary/Chest: Breath sounds normal. She has no wheezes. She has no rales.   Abdominal: Soft.   Musculoskeletal: She exhibits edema.   Neurological: She is alert and oriented to person, place, and time.   Skin: Skin is warm and dry.   Psychiatric: She has a normal mood and affect.   Vitals reviewed.      Assessment:       1. CKD (chronic kidney disease) stage 4, GFR 15-29 ml/min    2. CKD (chronic kidney disease), stage IV    3. Metabolic acidosis    4. Neurogenic bladder    5. Iron deficiency anemia, unspecified iron deficiency anemia type    6. Other sideroblastic anemia    7. Vitamin D deficiency disease    8. Secondary  hyperparathyroidism, renal    9. Morbid obesity due to excess calories    10. Uncontrolled type 2 diabetes mellitus with stage 4 chronic kidney disease, with long-term current use of insulin    11. VIJAYA (obstructive sleep apnea)    12. Mobility impaired    13. Fibromyalgia    14. Essential hypertension        Plan:           CKD Stage 4 with stable kidney function and proteinuria.     Mineral and Bone Disease--PTH at goal. Continue calcitriol and D3. She is on exogenous bicarbonate for metabolic acidosis    HTN is controlled    UTI hx given suprapubic catheter--continue Urology follow up    DM--improved overall but needs further improvement. Keep Endocrine follow up    Morbid Obesity--her weight gain continues. This is very worrisome. Consider bariatric surgery      RTC 4 months

## 2018-07-02 ENCOUNTER — PATIENT MESSAGE (OUTPATIENT)
Dept: UROLOGY | Facility: CLINIC | Age: 68
End: 2018-07-02

## 2018-07-02 ENCOUNTER — TELEPHONE (OUTPATIENT)
Dept: UROLOGY | Facility: CLINIC | Age: 68
End: 2018-07-02

## 2018-07-03 ENCOUNTER — TELEPHONE (OUTPATIENT)
Dept: UROLOGY | Facility: CLINIC | Age: 68
End: 2018-07-03

## 2018-07-05 ENCOUNTER — PATIENT MESSAGE (OUTPATIENT)
Dept: ENDOCRINOLOGY | Facility: CLINIC | Age: 68
End: 2018-07-05

## 2018-07-06 ENCOUNTER — CLINICAL SUPPORT (OUTPATIENT)
Dept: REHABILITATION | Facility: HOSPITAL | Age: 68
End: 2018-07-06
Attending: ANESTHESIOLOGY
Payer: MEDICARE

## 2018-07-06 ENCOUNTER — PATIENT MESSAGE (OUTPATIENT)
Dept: UROLOGY | Facility: CLINIC | Age: 68
End: 2018-07-06

## 2018-07-06 DIAGNOSIS — M54.50 CHRONIC BILATERAL LOW BACK PAIN WITHOUT SCIATICA: ICD-10-CM

## 2018-07-06 DIAGNOSIS — R53.1 WEAKNESS GENERALIZED: ICD-10-CM

## 2018-07-06 DIAGNOSIS — G89.29 CHRONIC BILATERAL LOW BACK PAIN WITHOUT SCIATICA: ICD-10-CM

## 2018-07-06 PROCEDURE — G8978 MOBILITY CURRENT STATUS: HCPCS | Mod: CL,PO

## 2018-07-06 PROCEDURE — G8979 MOBILITY GOAL STATUS: HCPCS | Mod: CK,PO

## 2018-07-06 PROCEDURE — 97110 THERAPEUTIC EXERCISES: CPT | Mod: PO

## 2018-07-06 PROCEDURE — 97116 GAIT TRAINING THERAPY: CPT | Mod: PO

## 2018-07-06 RX ORDER — OXYBUTYNIN CHLORIDE 10 MG/1
10 TABLET, EXTENDED RELEASE ORAL DAILY
Qty: 30 TABLET | Refills: 9 | Status: SHIPPED | OUTPATIENT
Start: 2018-07-06 | End: 2019-09-23 | Stop reason: SDUPTHER

## 2018-07-06 NOTE — PROGRESS NOTES
OUTPATIENT PHYSICAL THERAPY  PHYSICAL THERAPY PROGRESS NOTE     Name: Cecile Bowen  Clinic Number: [id number]     Evaluation Date: 05/16/2018  Visit #: 4/ 20  Authorization period Expiration: 12/31/2018  Plan of Care Expiration: 07/16/2018  Precautions: Standard      Diagnosis:        Encounter Diagnoses   Name Primary?    Chronic bilateral low back pain without sciatica      Weakness generalized        Physician: April Torres MD  Treatment Orders: PT Eval and Treat       Past Medical History:   Diagnosis Date    Back pain      CKD (chronic kidney disease) stage 3, GFR 30-59 ml/min       as per patient    Diabetes type 2, uncontrolled 12/12/2012    DJD (degenerative joint disease) 12/12/2012    Hypertension 12/12/2012    Hypothyroidism 12/12/2012    Levoscoliosis      Migraine      Nuclear sclerosis - Both Eyes 3/24/2014    Obesity 12/12/2012          History of Present Illness: Pt presents to [facility] with complaints of chronic history of LBP. Per pt report, she has been diagnosed with DDD, OA, and scoliosis. Pt recently received RFA one month ago to lumbar spine with little pain relief. Pt mostly non-ambulatory and uses scooter for community distances. Pt uses cane to ambulate within the home. Pt additionally c/o B knee with L>R. Pt with prior history of multiple falls; however, has not had a fall in the past year. Pt with no specific aggravating factors, and reports back will spasm at random.           History   Prior Therapy: Prior therapy years ago for lower extremities   Social History: Lives in [facility] with disabled brother and sister   Previous functional status: Prior to incident, pt independent in all ADLs and physical activity    Current functional status: Able to complete ADLs with additional time; unable to cook or perform housework   Work: Retired      Subjective     Pt reports she has been feeling much better since beginning therapy services. Pt reports she now has more good days  "than bad.     Pain: 6/10 back    Objective     Time In:  3:00 pm  Time Out: 4 00 pm    Pt received MHP x 5 minutes to lumbar spine to promote pain control and relaxation. Pt continued to lie on heat for 2 of the exercises.  Pt reported a decrease in pain, 5/10, following heat.    Cecile received 40 minutes of therapeutic exercises including:   Pelvic tilts 20 x 5" hold   Hooklying hip abd GTB x 10 5" hold  Hooklying add x 10 5"  LAQ 2# 2 x 10   Standing Hip Ext/Abd x 10 each   Standing Marches 2 x 5 each LE   Tandem Stance 2 x 30" each       Patient participated in gait training for 10 minutes using SBQC and CGA for 25 feet x 4 trials     DID NOT PERFORM:  Ball Roll outs fwd 10 x 5" hold   Lateral ball roll outs x 10 5" hold   LTR x 10   Glut sets 20 x5" hold   Seated Marches x 10  Seated shoulder rows with OTB x 10  Seated shoulder extension with OTB x 10  TA pulldowns with GTB x 10    Cecile received the following manual therapy techniques: Soft tissue Mobilization were applied to the: lumbar spine for 9 minutes. (NOT PERFORMED TODAY)  STM to lumbar paraspinals in sitting with forward flexion        Written Home Exercises Provided: none added  Exercises were reviewed and Cecile was able to demonstrate them prior to the end of the session. Pt received a written copy of exercises to perform at home. Cecile demonstrated good  understanding of the education provided.       Assessment   Pt very motivated for therapy services today. Pt with improved gait quality, transfers, and overall functional mobility today. Pt able to tolerate progression of therex to include standing therex and balance activities. Will progress as tolerated.  Will continue to monitor and progress as tolerated. Pt will benefit from skilled outpatient physical therapy to address the above stated deficits, provide pt/family education, and to maximize pt's level of independence.              History  Co-morbidities and personal factors that may impact " the plan of care Examination  Body Structures and Functions, activity limitations and participation restrictions that may impact the plan of care      Clinical Presentation   Co-morbidities:   high BMI and prior lumbar surgery           Personal Factors:   no deficits Body Regions:   back     Body Systems:   ROM  strength  gait  transfers           Participation Restrictions:   Pt unable to perform housework and cooking. Pt unable to ambulate community distances.        Activity limitations:   Learning and applying knowledge  no deficits     General Tasks and Commands  no deficits     Communication  no deficits     Mobility  no deficits     Self care  no deficits     Domestic Life  cooking  doing house work (cleaning house, washing dishes, laundry)  assisting others     Interactions/Relationships  no deficits     Life Areas  no deficits     Community and Social Life  no deficits             stable and uncomplicated                                low   moderate   high Decision Making/ Complexity Score:  low      CMS Impairment/Limitation/Restriction for FOTO Lumbar Spine Survey                          Status             Limitation   Intake              30%                 70%   Predicted        42%                 58%      G-Code                        CMS Severity Modifier   Current Status          CL - At least 60 percent but less than 80 percent  Goal Status+              CK - At least 40 percent but less than 60 percent     Pt's spiritual, cultural and educational needs considered and pt agreeable to plan of care and goals as stated below:      Anticipated Barriers for physical therapy: none         Plan   Continue with POC towards goals.     Adia Foy, PT

## 2018-07-06 NOTE — PLAN OF CARE
"OUTPATIENT PHYSICAL THERAPY  PHYSICAL THERAPY EVALUATION    Name: Cecile Bowen  Clinic Number: [id number]    Evaluation Date: 05/16/2018  Visit #: 5 / 20  Authorization period Expiration: 12/31/2018  Plan of Care Expiration: 09/06/2018  Precautions: Standard     Diagnosis:   Encounter Diagnoses   Name Primary?    Chronic bilateral low back pain without sciatica     Weakness generalized      Physician: April Torres MD  Treatment Orders: PT Eval and Treat  Past Medical History:   Diagnosis Date    Back pain     CKD (chronic kidney disease) stage 3, GFR 30-59 ml/min     as per patient    Diabetes type 2, uncontrolled 12/12/2012    DJD (degenerative joint disease) 12/12/2012    Hypertension 12/12/2012    Hypothyroidism 12/12/2012    Levoscoliosis     Migraine     Nuclear sclerosis - Both Eyes 3/24/2014    Obesity 12/12/2012     Current Outpatient Prescriptions   Medication Sig    aspirin-acetaminophen-caffeine 250-250-65 mg (EXCEDRIN MIGRAINE) 250-250-65 mg per tablet Take 1 tablet by mouth every 6 (six) hours as needed for Pain.    BD INSULIN PEN NEEDLE UF SHORT 31 gauge x 5/16" Ndle USE ONCE DAILY WITH LANTUS SOLOSTAR PEN INSULIN    BD INSULIN SYRINGE ULTRA-FINE 1/2 mL 31 gauge x 15/64" Syrg USE WITH INSULIN 4 TIMES A DAY    blood sugar diagnostic Strp ONE TOUCH ULTRA TEST STRIPS//Check blood sugars QID    blood-glucose meter kit ONE TOUCH ULTRA    cloNIDine (CATAPRES) 0.1 MG tablet One po q8 hours prn SBP > 170    cyanocobalamin, vitamin B-12, (VITAMIN B-12) 5,000 mcg Subl Place 1 tablet under the tongue once daily.    ergocalciferol (VITAMIN D2) 50,000 unit Cap TAKE 1 CAPSULE (50,000 UNITS TOTAL) BY MOUTH EVERY 7 DAYS.    ferrous sulfate 325 (65 FE) MG EC tablet Take 325 mg by mouth once daily.     gemfibrozil (LOPID) 600 MG tablet Patient takes one tablet every other day    hydrocodone-acetaminophen 10-325mg (NORCO)  mg Tab Take 1 tablet by mouth every 6 (six) hours as needed.    " insulin lispro (HUMALOG) 100 unit/mL injection Inject under the skin 7 units in the AM, 10 units for lunch, 12 units for Dinner , before meals    lancets (ONE TOUCH DELICA LANCETS) Misc Ultra 2 lancets to check blood sugar BID    LANTUS SOLOSTAR 100 unit/mL (3 mL) InPn pen INJECT 19 UNITS INTO THE SKIN EVERY EVENING.    LANTUS SOLOSTAR U-100 INSULIN 100 unit/mL (3 mL) InPn pen INJECT 19 UNITS INTO THE SKIN EVERY EVENING.    levothyroxine (SYNTHROID) 50 MCG tablet Take 1 tablet (50 mcg total) by mouth once daily.    magnesium oxide (MAG-OX) 400 mg tablet TAKE 1 TABLET (400 MG TOTAL) BY MOUTH 2 (TWO) TIMES DAILY.    methocarbamol (ROBAXIN) 750 MG Tab TAKE 1-2 TABLETS BY MOUTH 4 TIMES DAILY AS NEEDED    multivitamin with minerals tablet Take 1 tablet by mouth once daily.    oxybutynin (DITROPAN-XL) 10 MG 24 hr tablet Take 10 mg by mouth once daily.    pravastatin (PRAVACHOL) 40 MG tablet TAKE 1 TABLET BY MOUTH EVERY DAY    riboflavin, vitamin B2, 400 mg Tab Take 400 mg by mouth once daily.    scopolamine (TRANSDERM-SCOP) 1.3-1.5 mg (1 mg over 3 days) Place 1 patch onto the skin every 72 hours.    sodium bicarbonate 650 MG tablet TAKE 1 TABLET (650 MG TOTAL) BY MOUTH 3 (THREE) TIMES DAILY.    sumatriptan (IMITREX) 100 MG tablet Take 1 tablet (100 mg total) by mouth 2 (two) times daily as needed for Migraine.    sumatriptan (IMITREX) 100 MG tablet TAKE 1 TABLET (100 MG TOTAL) BY MOUTH 2 (TWO) TIMES DAILY AS NEEDED FOR MIGRAINE.    topiramate (TOPAMAX) 200 MG Tab Take 1 tablet (200 mg total) by mouth 2 (two) times daily.    traMADol (ULTRAM) 50 mg tablet Take 1 tablet (50 mg total) by mouth every 6 (six) hours as needed for Pain.    venlafaxine (EFFEXOR-XR) 75 MG 24 hr capsule Take 2 capsules (150 mg total) by mouth once daily.     No current facility-administered medications for this visit.      Review of patient's allergies indicates:  No Known Allergies    History   Prior Therapy: Prior therapy years  "ago for lower extremities   Social History: Lives in [facility] with disabled brother and sister   Previous functional status: Prior to incident, pt independent in all ADLs and physical activity    Current functional status: Able to complete ADLs with additional time; unable to cook or perform housework   Work: Retired     Subjective   History of Present Illness: Pt presents to [facility] with complaints of chronic history of LBP. Per pt report, she has been diagnosed with DDD, OA, and scoliosis. Pt recently received RFA one month ago to lumbar spine with little pain relief. Pt mostly non-ambulatory and uses scooter for community distances. Pt uses cane to ambulate within the home. Pt additionally c/o B knee with L>R. Pt with prior history of multiple falls; however, has not had a fall in the past year. Pt with no specific aggravating factors, and reports back will spasm at random. Pt. denies history of minor or major trauma, motor vehicle accident, fall; past or present cancer; fever, chills, night sweats; unexplained weight change in past 3 months; recent infection; persistent night pain; saddle anesthesia, or changes in bowel/bladder function.      DOI: Chronic > 5 years   Imaging, bone scan films: 01/26/2018 " DJD."  Pain: current 7/10, worst 9/10, best 6/10, twisting, constant  Radicular symptoms: None   Aggravating factors: Bending forward, stationary positioning   Easing factors: heat and muscle relaxers/pain medication   Pts goals: Return to PLOF, return to long distance driving,     Objective   Mental status: alert, interactive, oriented x3  Posture/ Alignment: In standing, pt with wide CHRIS, mild genu valgum, increased anterior pelvic tilt, and forward head.     Movement Analysis: Pt uses wide CHRIS to perform sit<>stand transfer with UE support. Pt Mod I in bed mobility and transfers.     GAIT DEVIATIONS: Cecile amb SMQC , genu recurvatum of BLE, and increased lateral displacement at midstance.     ROM: "     AROM ROM (% of normal) Comment Normal   Flexion: 75% Pt required UE support for balance 60 deg   Extension: 40%  25 deg   Lateral Flex R: 100% With trunk rotation  25 deg   Lateral Flex L: 75% With trunk rotation  25 deg   Rotation R: 75%  18 deg   Rotation L: 75%  18 deg   *denotes pain        Strength:    Right Left Comment   Shoulder flexion: 4+/5 5/5    Shoulder Abduction: 4+/5 4+/5    Elbow Flexion: 5/5 5/5    Elbow Extension: 5/5 5/5     5/5 5/5           Right Left Comment   Hip Flexion: 4+/5 4+/5    Knee Extension: 5/5 5/5    Knee Flexion: 4+/5 4+/5    Ankle DF: 5/5 5/5        Special Tests:  5 x STS: 13 seconds with UE support     Neurovascular:  - Sensation: Grossly intact to light touch across BLE  - DTR: 2+ Patella B  - Clonus: negative   - Street: negaitve     Palpation:  Pt with increased muscle tone along lumbar and thoracic paraspinals. Pt TTP along lumbar paraspinals.     Joint Play:  Not assessed      Pt/family was provided educational information, including: role of PT, goals for PT, scheduling - pt verbalized understanding. Discussed insurance plan with pt.     TREATMENT   Time In: 3;00 PM  Time Out: 4:00 PM    Discussed Plan of Care with patient: Yes    Please refer to progress note dated 07/06/2018 for today's treatment documentation.     Written Home Exercises Provided: See Patient Instructions   Exercises were reviewed and Cecile was able to demonstrate them prior to the end of the session. Pt received a written copy of exercises to perform at home. Cecile demonstrated good  understanding of the education provided.     Assessment   Cecile is a 67 y.o. female referred to outpatient physical therapy with a medical diagnosis of chronic pain disorder, lumbar spondylosis, myalgia, and physical deconditioning. Pt demonstrates generalized muscle weakness and decreased functional mobility. Pt will benefit from skilled therapy services at this time to improve lumbar stabilization, gross  muscle strength, and overall functional mobility, so she may return to PLOF. Pt prognosis is Good. Positive prognostic factors include motivation for therapy services. Negative prognostic factors include multiple co-morbidities, severity and chronicity of symptoms. Pt will benefit from skilled outpatient physical therapy to address the above stated deficits, provide pt/family education, and to maximize pt's level of independence.     History  Co-morbidities and personal factors that may impact the plan of care Examination  Body Structures and Functions, activity limitations and participation restrictions that may impact the plan of care    Clinical Presentation   Co-morbidities:   high BMI and prior lumbar surgery        Personal Factors:   no deficits Body Regions:   back    Body Systems:   ROM  strength  gait  transfers        Participation Restrictions:   Pt unable to perform housework and cooking. Pt unable to ambulate community distances.      Activity limitations:   Learning and applying knowledge  no deficits    General Tasks and Commands  no deficits    Communication  no deficits    Mobility  no deficits    Self care  no deficits    Domestic Life  cooking  doing house work (cleaning house, washing dishes, laundry)  assisting others    Interactions/Relationships  no deficits    Life Areas  no deficits    Community and Social Life  no deficits         stable and uncomplicated                      low   moderate  high Decision Making/ Complexity Score:  low     CMS Impairment/Limitation/Restriction for FOTO Lumbar Spine Survey    Status  Limitation   Intake   30%   70%   Predicted  42%   58%     G-Code   CMS Severity Modifier   Current Status CL - At least 60 percent but less than 80 percent  Goal Status+   CK - At least 40 percent but less than 60 percent    Pt's spiritual, cultural and educational needs considered and pt agreeable to plan of care and goals as stated below:     Anticipated Barriers for  physical therapy: none     Short Term GOALS:  In 4 weeks, pt will:  Report decreased back pain < / = 6 /10 to increase tolerance for walking long distances (MET 07/06/2018)  Pt will perform sit to stand transfer from standard height chair with no use of UEs and < or = min A in order to be independent with transfers. (MET 07/06/2018)  Pt will report > or = 20% decrease in difficulty performing household chores in order to improve functional mobility. (MET 07/06/2018)  Pt to tolerate HEP to improve ROM and independence with ADL's (MET 07/06/2018)      Long term goals  In 8 weeks, pt will:  Pt will be able to perform 5 consecutive sit to stand transfers s/ UE use on first attempts from std ht chair in order to improve transfer skills  Pt will be able to perform bridging x 5 without pain and clearing the gluts to improve standing tolerance   Pt will be able to demonstrate a proper TA contraction in order to provide stability in lumbar spine with functional tasks.  Pt demonstrate independence in HEP and POC to improve ROM and independence with ADL's.      Plan   Outpatient physical therapy 1 - 2 times weekly to include: pt ed, HEP, therapeutic exercises, therapeutic activities, neuromuscular re-education/ balance exercises, manual therapy, dry needling, and modalities prn. Cont PT for 6-8 weeks. Pt may be seen by PTA as part of the rehabilitation team.     I certify the need for these services furnished under this plan of treatment and while under my care.    Adia Foy, PT

## 2018-07-19 ENCOUNTER — OFFICE VISIT (OUTPATIENT)
Dept: UROLOGY | Facility: CLINIC | Age: 68
End: 2018-07-19
Payer: MEDICARE

## 2018-07-19 VITALS
DIASTOLIC BLOOD PRESSURE: 92 MMHG | SYSTOLIC BLOOD PRESSURE: 143 MMHG | BODY MASS INDEX: 43.9 KG/M2 | HEIGHT: 68 IN | WEIGHT: 289.69 LBS | HEART RATE: 115 BPM

## 2018-07-19 DIAGNOSIS — L92.9 GRANULATION TISSUE: ICD-10-CM

## 2018-07-19 DIAGNOSIS — N31.9 NEUROGENIC BLADDER: Primary | ICD-10-CM

## 2018-07-19 DIAGNOSIS — Z43.5 ENCOUNTER FOR SUPRAPUBIC CATHETER CARE: ICD-10-CM

## 2018-07-19 DIAGNOSIS — Z93.59 CHRONIC SUPRAPUBIC CATHETER: ICD-10-CM

## 2018-07-19 PROCEDURE — 99214 OFFICE O/P EST MOD 30 MIN: CPT | Mod: 25,S$PBB,, | Performed by: NURSE PRACTITIONER

## 2018-07-19 PROCEDURE — 99999 PR PBB SHADOW E&M-EST. PATIENT-LVL III: CPT | Mod: PBBFAC,,, | Performed by: NURSE PRACTITIONER

## 2018-07-19 PROCEDURE — 51705 CHANGE OF BLADDER TUBE: CPT | Mod: PBBFAC | Performed by: NURSE PRACTITIONER

## 2018-07-19 PROCEDURE — 99213 OFFICE O/P EST LOW 20 MIN: CPT | Mod: PBBFAC | Performed by: NURSE PRACTITIONER

## 2018-07-19 PROCEDURE — 17250 CHEM CAUT OF GRANLTJ TISSUE: CPT | Mod: S$PBB,51,, | Performed by: NURSE PRACTITIONER

## 2018-07-19 PROCEDURE — 51705 CHANGE OF BLADDER TUBE: CPT | Mod: S$PBB,,, | Performed by: NURSE PRACTITIONER

## 2018-07-19 PROCEDURE — 17250 CHEM CAUT OF GRANLTJ TISSUE: CPT | Mod: PBBFAC | Performed by: NURSE PRACTITIONER

## 2018-07-19 NOTE — PROGRESS NOTES
Subjective:       Patient ID: Cecile Bowen is a 68 y.o. female.    Chief Complaint: Other (cath change 16 f) and Urinary Retention (Neurogenic Bladder)    Cecile Bowen is a 68 y.o. Female with history of bilateral hydronephrosis, incomplete bladder emptying which is managed by SPT (16fr).  Her last SPT change was 06/19/2018.       05/14/2018 Procedure(s) Performed:   1.  Cystoscopy with bladder botox injection (300u)  2.  Suprapubic catheter change.     Here today for scheduled SPT change.   SPT is draining well.  Bladder spasm are greatly reduced; pleased with botox.    No fever, n/v              Past Medical History:    Diabetes type 2, uncontrolled                   12/12/2012    Obesity                                         12/12/2012    Hypertension                                    12/12/2012    DJD (degenerative joint disease)                12/12/2012    Hypothyroidism                                  12/12/2012    Nuclear sclerosis - Both Eyes                   3/24/2014     Migraine                                                      Past Surgical History:    TOTAL ABDOMINAL HYSTERECTOMY W/ BILATERAL SALP*  1985          GALLBLADDER SURGERY                              2006          TONSILLECTOMY, ADENOIDECTOMY                                   OVARIAN CYST SURGERY                             1985          HYSTERECTOMY                                                   DILATION AND CURETTAGE OF UTERUS                               Review of patient's family history indicates:    Diabetes                       Sister                    Kidney disease                 Sister                    ALS                            Mother                      Comment: d.    Cancer                         Maternal Grandmother        Comment: d. colon    Cancer                         Paternal Grandfather        Comment: d. lung    Diabetes                       Maternal Aunt             Kidney disease          "        Maternal Aunt             Diabetes                       Maternal Uncle            Amblyopia                      Neg Hx                    Blindness                      Neg Hx                    Cataracts                      Neg Hx                    Glaucoma                       Neg Hx                    Macular degeneration           Neg Hx                    Retinal detachment             Neg Hx                    Strabismus                     Neg Hx                      Social History    Marital Status:             Spouse Name:                       Years of Education:                 Number of children:               Occupational History    None on file    Social History Main Topics    Smoking Status: Never Smoker                      Smokeless Status: Never Used                        Alcohol Use: No              Drug Use: No              Sexual Activity: Not Currently           Birth Control/Protection: Abstinence    Other Topics            Concern    None on file    Social History Narrative    Single      Lives w/ sister  And brother     both are helping her during her post hosp recovery period        Allergies:  Review of patient's allergies indicates no known allergies.    Medications:  Current outpatient prescriptions:amitriptyline (ELAVIL) 10 MG tablet, TAKE 3 TABLETS BY MOUTH IN THE EVENING, Disp: 90 tablet, Rfl: 5;  aspirin (ECOTRIN) 81 MG EC tablet, Take 81 mg by mouth once daily., Disp: , Rfl: ;  BD INSULIN PEN NEEDLE UF SHORT 31 X 5/16 " Ndle, USE ONCE DAILY WITH LANTUS SOLOSTAR PEN INSULIN, Disp: 100 each, Rfl: 11  butalbital-acetaminophen-caffeine -40 mg (FIORICET, ESGIC) -40 mg per tablet, TAKE 1 TABLET EVERY 4 TO 6 HOURS, Disp: 30 tablet, Rfl: 0;  cyanocobalamin, vitamin B-12, (VITAMIN B-12) 2,500 mcg Subl, Place 2,500 mcg under the tongue once daily., Disp: , Rfl: ;  diphenhydrAMINE (BENADRYL) 25 mg capsule, Take 25 mg by mouth every 6 (six) hours as needed., " "Disp: , Rfl:   ferrous sulfate 325 (65 FE) MG EC tablet, Take 325 mg by mouth 3 (three) times daily with meals., Disp: , Rfl: ;  fluticasone (FLONASE) 50 mcg/actuation nasal spray, 1 SPRAY IN EACH NOSTRIL DAILY (Patient taking differently: 1 SPRAY IN EACH NOSTRIL DAILY PRN), Disp: 16 g, Rfl: 1;  furosemide (LASIX) 20 MG tablet, Take 1 tablet (20 mg total) by mouth once daily., Disp: 4 tablet, Rfl: 0  gemfibrozil (LOPID) 600 MG tablet, TAKE 1 TABLET BY MOUTH TWICE A DAY, Disp: 60 tablet, Rfl: 5;  (START ON 4/29/2015) hydrocodone-acetaminophen 10-325mg (NORCO)  mg Tab, Take 1 tablet by mouth every 6 (six) hours as needed., Disp: 120 tablet, Rfl: 0;  insulin lispro (HUMALOG) 100 unit/mL injection, Inject 7 Units into the skin 3 (three) times daily before meals., Disp: 6.3 mL, Rfl: 11  insulin syringe-needle U-100 (BD INSULIN SYRINGE ULTRA-FINE) 1/2 mL 31 x 15/64" Syrg, 1 Box by Misc.(Non-Drug; Combo Route) route 4 (four) times daily., Disp: 100 Syringe, Rfl: 12;  lancets (ONE TOUCH DELICA LANCETS) Misc, Ultra 2 lancets to check blood sugar BID, Disp: 100 each, Rfl: 6;  LANTUS SOLOSTAR 100 unit/mL (3 mL) InPn pen, , Disp: , Rfl: 3  LIDODERM 5 %(700 mg/patch), APPLY 1 PATCH TO SKIN DAILY (LEAVING ON FOR 12 HOURS AND OFF FOR 12 HOURS), Disp: 30 patch, Rfl: 6;  magnesium oxide (MAG-OX) 400 mg tablet, TAKE 1 TABLET (400 MG TOTAL) BY MOUTH 2 (TWO) TIMES DAILY., Disp: 60 tablet, Rfl: 11;  methocarbamol (ROBAXIN) 750 MG Tab, TAKE 1 TO 2 TABLETS BY MOUTH 3 TIMES A DAY (Patient taking differently: Take 2 tablets twice daily), Disp: 120 tablet, Rfl: 3  methocarbamol (ROBAXIN) 750 MG Tab, TAKE 1 TO 2 TABLETS BY MOUTH 3 TIMES A DAY, Disp: 120 tablet, Rfl: 3;  methylPREDNISolone (MEDROL DOSEPACK) 4 mg tablet, use as directed, Disp: 21 tablet, Rfl: 0;  multivitamin with minerals tablet, Take 1 tablet by mouth once daily., Disp: , Rfl: ;  pravastatin (PRAVACHOL) 40 MG tablet, TAKE 1 TABLET BY MOUTH EVERY DAY, Disp: 30 tablet, " Rfl: 2  pyridoxine (B-6) 100 MG Tab, Take 1 tablet (100 mg total) by mouth once daily., Disp: 30 tablet, Rfl: 12;  scopolamine (TRANSDERM-SCOP) 1.5 mg, Place 1 patch (1.5 mg total) onto the skin every 72 hours., Disp: 4 patch, Rfl: 0;  sulfamethoxazole-trimethoprim 800-160mg (BACTRIM DS) 800-160 mg Tab, Take 1 tablet by mouth 2 (two) times daily., Disp: , Rfl: 0  sumatriptan (IMITREX) 100 MG tablet, TAKE 1 TABLET (100 MG TOTAL) BY MOUTH ONCE., Disp: 9 tablet, Rfl: 6;  SYNTHROID 125 mcg tablet, TAKE 1 TABLET BY MOUTH DAILY, Disp: 90 tablet, Rfl: 4;  topiramate (TOPAMAX) 100 MG tablet, TAKE 1 TABLET (100 MG TOTAL) BY MOUTH 2 (TWO) TIMES DAILY., Disp: 60 tablet, Rfl: 11;  topiramate (TOPAMAX) 25 MG tablet, Take 4 tablets (100 mg total) by mouth 2 (two) times daily., Disp: 240 tablet, Rfl: 5  venlafaxine (EFFEXOR-XR) 150 MG Cp24, TAKE 1 CAPSULE BY MOUTH ONCE DAILY, Disp: 30 capsule, Rfl: 2;  vitamin D (VITAMIN D3) 185 MG Tab, Take 185 mg by mouth once daily., Disp: , Rfl:           Review of Systems   Constitutional: Negative.  Negative for chills and fever.   HENT: Negative for facial swelling and trouble swallowing.    Eyes: Negative for visual disturbance.   Respiratory: Negative for cough, chest tightness, shortness of breath and wheezing.    Cardiovascular: Negative for chest pain and palpitations.   Gastrointestinal: Negative for abdominal pain, constipation, nausea and vomiting.   Genitourinary: Positive for difficulty urinating. Negative for dysuria, hematuria, urgency and vaginal bleeding.        SPT draining well     Musculoskeletal: Positive for arthralgias and gait problem.   Skin: Negative for rash.   Neurological: Negative for dizziness and headaches.   Hematological: Does not bruise/bleed easily.   Psychiatric/Behavioral: Negative for behavioral problems.       Objective:      Physical Exam   Nursing note and vitals reviewed.  Constitutional: She is oriented to person, place, and time. Vital signs are  normal. She appears well-developed and well-nourished. She is active and cooperative.   HENT:   Head: Normocephalic.   Right Ear: External ear normal.   Left Ear: External ear normal.   Nose: Nose normal.   Eyes: Conjunctivae and lids are normal. Right eye exhibits no discharge. Left eye exhibits no discharge. No scleral icterus.   Neck: Trachea normal and normal range of motion. Neck supple. No tracheal deviation present.   Cardiovascular: Normal rate, regular rhythm and intact distal pulses.    Pulmonary/Chest: Effort normal. No respiratory distress.   Abdominal: Soft. Bowel sounds are normal. She exhibits no distension and no mass. There is no tenderness. There is no rebound and no guarding.       Musculoskeletal: Normal range of motion. She exhibits no edema.   Neurological: She is alert and oriented to person, place, and time.   Skin: Skin is warm, dry and intact.     Psychiatric: She has a normal mood and affect. Her behavior is normal. Judgment and thought content normal.       Assessment:       1. Neurogenic bladder    2. Chronic suprapubic catheter    3. Encounter for suprapubic catheter care        Plan:          I spent 25 minutes with the patient of which more than half was spent in direct consultation with the patient in regards to our treatment and plan.    Education and recommendations of today's plan of care including home remedies.  We discussed daily care; keeping surrounding tissue clean and dry.  I removed old SPT without difficulty.  I placed a new 16fr SPT easily using sterile technique.   Irrigated to verify the position.  Balloon inflated with 9cc sterile water; plug placed.  Silver Nitrate sticks x 2 to the granulating tissue.  Skin barrier cream and dsg applied to site.   RTC one month

## 2018-08-01 ENCOUNTER — PATIENT MESSAGE (OUTPATIENT)
Dept: NEUROLOGY | Facility: CLINIC | Age: 68
End: 2018-08-01

## 2018-08-23 ENCOUNTER — OFFICE VISIT (OUTPATIENT)
Dept: UROLOGY | Facility: CLINIC | Age: 68
End: 2018-08-23
Payer: MEDICARE

## 2018-08-23 ENCOUNTER — OFFICE VISIT (OUTPATIENT)
Dept: OPTOMETRY | Facility: CLINIC | Age: 68
End: 2018-08-23
Payer: MEDICARE

## 2018-08-23 VITALS — DIASTOLIC BLOOD PRESSURE: 87 MMHG | HEART RATE: 99 BPM | SYSTOLIC BLOOD PRESSURE: 142 MMHG

## 2018-08-23 DIAGNOSIS — H25.13 NUCLEAR SCLEROSIS, BILATERAL: ICD-10-CM

## 2018-08-23 DIAGNOSIS — Z13.5 GLAUCOMA SCREENING: ICD-10-CM

## 2018-08-23 DIAGNOSIS — Z43.5 ENCOUNTER FOR CARE OR REPLACEMENT OF SUPRAPUBIC TUBE: ICD-10-CM

## 2018-08-23 DIAGNOSIS — H52.4 HYPEROPIA WITH PRESBYOPIA OF BOTH EYES: ICD-10-CM

## 2018-08-23 DIAGNOSIS — E11.9 TYPE 2 DIABETES MELLITUS WITHOUT RETINOPATHY: Primary | ICD-10-CM

## 2018-08-23 DIAGNOSIS — Z93.59 CHRONIC SUPRAPUBIC CATHETER: ICD-10-CM

## 2018-08-23 DIAGNOSIS — H52.03 HYPEROPIA WITH PRESBYOPIA OF BOTH EYES: ICD-10-CM

## 2018-08-23 DIAGNOSIS — N31.9 NEUROGENIC BLADDER: Primary | ICD-10-CM

## 2018-08-23 PROCEDURE — 99214 OFFICE O/P EST MOD 30 MIN: CPT | Mod: S$PBB,25,, | Performed by: NURSE PRACTITIONER

## 2018-08-23 PROCEDURE — 99212 OFFICE O/P EST SF 10 MIN: CPT | Mod: PBBFAC,PO,25 | Performed by: OPTOMETRIST

## 2018-08-23 PROCEDURE — 92014 COMPRE OPH EXAM EST PT 1/>: CPT | Mod: S$PBB,,, | Performed by: OPTOMETRIST

## 2018-08-23 PROCEDURE — 99999 PR PBB SHADOW E&M-EST. PATIENT-LVL III: CPT | Mod: PBBFAC,,, | Performed by: NURSE PRACTITIONER

## 2018-08-23 PROCEDURE — 51705 CHANGE OF BLADDER TUBE: CPT | Mod: PBBFAC | Performed by: NURSE PRACTITIONER

## 2018-08-23 PROCEDURE — 51705 CHANGE OF BLADDER TUBE: CPT | Mod: S$PBB,,, | Performed by: NURSE PRACTITIONER

## 2018-08-23 PROCEDURE — 92015 DETERMINE REFRACTIVE STATE: CPT | Mod: ,,, | Performed by: OPTOMETRIST

## 2018-08-23 PROCEDURE — 99999 PR PBB SHADOW E&M-EST. PATIENT-LVL II: CPT | Mod: PBBFAC,,, | Performed by: OPTOMETRIST

## 2018-08-23 PROCEDURE — 99213 OFFICE O/P EST LOW 20 MIN: CPT | Mod: PBBFAC,27,25 | Performed by: NURSE PRACTITIONER

## 2018-08-23 NOTE — PROGRESS NOTES
HPI     DLS:12/1/16  Pt states her eye get a lot tired, a lot of headaches when reading and   watching tv OTC readers 1.50 for tv  OTC readers 4.00 for reading.   Floaters in both eyes   LBS: didn't check it yet, yesterday it was 137.   No gtts   Hemoglobin A1C       Date                     Value               Ref Range             Status                09/21/2017               7.5 (H)             4.0 - 5.6 %           Final              Comment:    According to ADA guidelines, hemoglobin A1c <7.0% represents  optimal   control in non-pregnant diabetic patients. Different  metrics may apply to   specific patient populations.   Standards of Medical Care in   Diabetes-2016.  For the purpose of screening for the presence of   diabetes:  <5.7%     Consistent with the absence of diabetes  5.7-6.4%    Consistent with increasing risk for diabetes   (prediabetes)  >or=6.5%    Consistent with diabetes  Currently, no consensus exists for use of   hemoglobin A1c  for diagnosis of diabetes for children.  This Hemoglobin   A1c assay has significant interference with fetal   hemoglobin   (HbF).   The results are invalid for patients with abnormal amounts of   HbF,     including those with known Hereditary Persistence   of Fetal Hemoglobin.   Heterozygous hemoglobin variants (HbAS, HbAC,   HbAD, HbAE, HbA2) do not   significantly interfere with this assay;   however, presence of multiple   variants in a sample may impact the %   interference.         03/30/2017               7.0 (H)             4.5 - 6.2 %           Final              Comment:    According to ADA guidelines, hemoglobin A1C <7.0% represents  optimal   control in non-pregnant diabetic patients.  Different  metrics may apply   to specific populations.   Standards of Medical Care in Diabetes -   2016.  For the purpose of screening for the presence of diabetes:  <5.7%       Consistent with the absence of diabetes  5.7-6.4%  Consistent with   increasing risk for  diabetes   (prediabetes)  >or=6.5%  Consistent with   diabetes  Currently no consensus exists for use of hemoglobin A1C  for   diagnosis of diabetes for children.         01/05/2017               6.8 (H)             4.5 - 6.2 %           Final              Comment:    According to ADA guidelines, hemoglobin A1C <7.0% represents  optimal   control in non-pregnant diabetic patients.  Different  metrics may apply   to specific populations.   Standards of Medical Care in Diabetes -   2016.  For the purpose of screening for the presence of diabetes:  <5.7%       Consistent with the absence of diabetes  5.7-6.4%  Consistent with   increasing risk for diabetes   (prediabetes)  >or=6.5%  Consistent with   diabetes  Currently no consensus exists for use of hemoglobin A1C  for   diagnosis of diabetes for children.    ----------      Last edited by Ravi Kearns, OD on 8/23/2018 10:44 AM. (History)        ROS     Positive for: Endocrine (DM)    Negative for: Constitutional, Gastrointestinal, Neurological, Skin,   Genitourinary, Musculoskeletal, HENT, Eyes, Respiratory, Psychiatric,   Allergic/Imm, Heme/Lymph    Last edited by Ravi Kearns, OD on 8/23/2018 10:44 AM. (History)        Assessment /Plan     For exam results, see Encounter Report.    Type 2 diabetes mellitus without retinopathy    Nuclear sclerosis, bilateral    Glaucoma screening    Hyperopia with presbyopia of both eyes      1. Cat OU w inc hyp.  Pt happy w  otc +1.50 for dist, +4.00 for near  2. DM- WITHOUT RETINOPATHY.  Advised yearly DFE   3. Dry eye sx--advised SYSTANE ATs BID+    PLAN:    rtc 1 yr

## 2018-08-23 NOTE — PROGRESS NOTES
Subjective:       Patient ID: Cecile Bowen is a 68 y.o. female.    Chief Complaint: Neurogenic Bladder (Chronic SPT)    Cecile Bowen is a 68 y.o. Female with history of bilateral hydronephrosis, incomplete bladder emptying which is managed by SPT (16fr).  Her last SPT change was 07/19/2018.       05/14/2018 Procedure(s) Performed:   1.  Cystoscopy with bladder botox injection (300u)  2.  Suprapubic catheter change.     Here today for scheduled SPT change.   SPT is draining well.  Bladder spasm are greatly reduced; pleased with botox.    No fever, n/v              Past Medical History:    Diabetes type 2, uncontrolled                   12/12/2012    Obesity                                         12/12/2012    Hypertension                                    12/12/2012    DJD (degenerative joint disease)                12/12/2012    Hypothyroidism                                  12/12/2012    Nuclear sclerosis - Both Eyes                   3/24/2014     Migraine                                                      Past Surgical History:    TOTAL ABDOMINAL HYSTERECTOMY W/ BILATERAL SALP*  1985          GALLBLADDER SURGERY                              2006          TONSILLECTOMY, ADENOIDECTOMY                                   OVARIAN CYST SURGERY                             1985          HYSTERECTOMY                                                   DILATION AND CURETTAGE OF UTERUS                               Review of patient's family history indicates:    Diabetes                       Sister                    Kidney disease                 Sister                    ALS                            Mother                      Comment: d.    Cancer                         Maternal Grandmother        Comment: d. colon    Cancer                         Paternal Grandfather        Comment: d. lung    Diabetes                       Maternal Aunt             Kidney disease                 Maternal Aunt              "Diabetes                       Maternal Uncle            Amblyopia                      Neg Hx                    Blindness                      Neg Hx                    Cataracts                      Neg Hx                    Glaucoma                       Neg Hx                    Macular degeneration           Neg Hx                    Retinal detachment             Neg Hx                    Strabismus                     Neg Hx                      Social History    Marital Status:             Spouse Name:                       Years of Education:                 Number of children:               Occupational History    None on file    Social History Main Topics    Smoking Status: Never Smoker                      Smokeless Status: Never Used                        Alcohol Use: No              Drug Use: No              Sexual Activity: Not Currently           Birth Control/Protection: Abstinence    Other Topics            Concern    None on file    Social History Narrative    Single      Lives w/ sister  And brother     both are helping her during her post hosp recovery period        Allergies:  Review of patient's allergies indicates no known allergies.    Medications:  Current outpatient prescriptions:amitriptyline (ELAVIL) 10 MG tablet, TAKE 3 TABLETS BY MOUTH IN THE EVENING, Disp: 90 tablet, Rfl: 5;  aspirin (ECOTRIN) 81 MG EC tablet, Take 81 mg by mouth once daily., Disp: , Rfl: ;  BD INSULIN PEN NEEDLE UF SHORT 31 X 5/16 " Ndle, USE ONCE DAILY WITH LANTUS SOLOSTAR PEN INSULIN, Disp: 100 each, Rfl: 11  butalbital-acetaminophen-caffeine -40 mg (FIORICET, ESGIC) -40 mg per tablet, TAKE 1 TABLET EVERY 4 TO 6 HOURS, Disp: 30 tablet, Rfl: 0;  cyanocobalamin, vitamin B-12, (VITAMIN B-12) 2,500 mcg Subl, Place 2,500 mcg under the tongue once daily., Disp: , Rfl: ;  diphenhydrAMINE (BENADRYL) 25 mg capsule, Take 25 mg by mouth every 6 (six) hours as needed., Disp: , Rfl:   ferrous sulfate 325 " "(65 FE) MG EC tablet, Take 325 mg by mouth 3 (three) times daily with meals., Disp: , Rfl: ;  fluticasone (FLONASE) 50 mcg/actuation nasal spray, 1 SPRAY IN EACH NOSTRIL DAILY (Patient taking differently: 1 SPRAY IN EACH NOSTRIL DAILY PRN), Disp: 16 g, Rfl: 1;  furosemide (LASIX) 20 MG tablet, Take 1 tablet (20 mg total) by mouth once daily., Disp: 4 tablet, Rfl: 0  gemfibrozil (LOPID) 600 MG tablet, TAKE 1 TABLET BY MOUTH TWICE A DAY, Disp: 60 tablet, Rfl: 5;  (START ON 4/29/2015) hydrocodone-acetaminophen 10-325mg (NORCO)  mg Tab, Take 1 tablet by mouth every 6 (six) hours as needed., Disp: 120 tablet, Rfl: 0;  insulin lispro (HUMALOG) 100 unit/mL injection, Inject 7 Units into the skin 3 (three) times daily before meals., Disp: 6.3 mL, Rfl: 11  insulin syringe-needle U-100 (BD INSULIN SYRINGE ULTRA-FINE) 1/2 mL 31 x 15/64" Syrg, 1 Box by Misc.(Non-Drug; Combo Route) route 4 (four) times daily., Disp: 100 Syringe, Rfl: 12;  lancets (ONE TOUCH DELICA LANCETS) Misc, Ultra 2 lancets to check blood sugar BID, Disp: 100 each, Rfl: 6;  LANTUS SOLOSTAR 100 unit/mL (3 mL) InPn pen, , Disp: , Rfl: 3  LIDODERM 5 %(700 mg/patch), APPLY 1 PATCH TO SKIN DAILY (LEAVING ON FOR 12 HOURS AND OFF FOR 12 HOURS), Disp: 30 patch, Rfl: 6;  magnesium oxide (MAG-OX) 400 mg tablet, TAKE 1 TABLET (400 MG TOTAL) BY MOUTH 2 (TWO) TIMES DAILY., Disp: 60 tablet, Rfl: 11;  methocarbamol (ROBAXIN) 750 MG Tab, TAKE 1 TO 2 TABLETS BY MOUTH 3 TIMES A DAY (Patient taking differently: Take 2 tablets twice daily), Disp: 120 tablet, Rfl: 3  methocarbamol (ROBAXIN) 750 MG Tab, TAKE 1 TO 2 TABLETS BY MOUTH 3 TIMES A DAY, Disp: 120 tablet, Rfl: 3;  methylPREDNISolone (MEDROL DOSEPACK) 4 mg tablet, use as directed, Disp: 21 tablet, Rfl: 0;  multivitamin with minerals tablet, Take 1 tablet by mouth once daily., Disp: , Rfl: ;  pravastatin (PRAVACHOL) 40 MG tablet, TAKE 1 TABLET BY MOUTH EVERY DAY, Disp: 30 tablet, Rfl: 2  pyridoxine (B-6) 100 MG Tab, " Take 1 tablet (100 mg total) by mouth once daily., Disp: 30 tablet, Rfl: 12;  scopolamine (TRANSDERM-SCOP) 1.5 mg, Place 1 patch (1.5 mg total) onto the skin every 72 hours., Disp: 4 patch, Rfl: 0;  sulfamethoxazole-trimethoprim 800-160mg (BACTRIM DS) 800-160 mg Tab, Take 1 tablet by mouth 2 (two) times daily., Disp: , Rfl: 0  sumatriptan (IMITREX) 100 MG tablet, TAKE 1 TABLET (100 MG TOTAL) BY MOUTH ONCE., Disp: 9 tablet, Rfl: 6;  SYNTHROID 125 mcg tablet, TAKE 1 TABLET BY MOUTH DAILY, Disp: 90 tablet, Rfl: 4;  topiramate (TOPAMAX) 100 MG tablet, TAKE 1 TABLET (100 MG TOTAL) BY MOUTH 2 (TWO) TIMES DAILY., Disp: 60 tablet, Rfl: 11;  topiramate (TOPAMAX) 25 MG tablet, Take 4 tablets (100 mg total) by mouth 2 (two) times daily., Disp: 240 tablet, Rfl: 5  venlafaxine (EFFEXOR-XR) 150 MG Cp24, TAKE 1 CAPSULE BY MOUTH ONCE DAILY, Disp: 30 capsule, Rfl: 2;  vitamin D (VITAMIN D3) 185 MG Tab, Take 185 mg by mouth once daily., Disp: , Rfl:           Review of Systems   Constitutional: Negative for chills and fever.   HENT: Negative for facial swelling and trouble swallowing.    Eyes: Positive for visual disturbance.        She had eyes dilated today.     Respiratory: Negative for cough, chest tightness, shortness of breath and wheezing.    Cardiovascular: Negative for chest pain and palpitations.   Gastrointestinal: Negative for abdominal pain, constipation, nausea and vomiting.   Genitourinary: Positive for difficulty urinating. Negative for dysuria, hematuria, urgency and vaginal bleeding.        SPT draining well     Musculoskeletal: Positive for arthralgias and gait problem.   Skin: Negative for rash.   Neurological: Positive for weakness. Negative for dizziness and headaches.   Hematological: Does not bruise/bleed easily.   Psychiatric/Behavioral: Negative for behavioral problems.       Objective:      Physical Exam   Constitutional: She is oriented to person, place, and time. Vital signs are normal. She appears  well-developed and well-nourished. She is active and cooperative.   HENT:   Head: Normocephalic.   Right Ear: External ear normal.   Left Ear: External ear normal.   Nose: Nose normal.   Eyes: Conjunctivae and lids are normal. Right eye exhibits no discharge. Left eye exhibits no discharge. No scleral icterus.   Neck: Trachea normal and normal range of motion. Neck supple. No tracheal deviation present.   Cardiovascular: Normal rate, regular rhythm and intact distal pulses.    Pulmonary/Chest: Effort normal. No respiratory distress.   Abdominal: Soft. Normal appearance and bowel sounds are normal. She exhibits no distension and no mass. There is no tenderness. There is no rebound and no guarding.       Musculoskeletal: Normal range of motion. She exhibits no edema.   Neurological: She is alert and oriented to person, place, and time.   Skin: Skin is warm, dry and intact.     Psychiatric: She has a normal mood and affect. Her behavior is normal. Judgment and thought content normal.       Assessment:       1. Neurogenic bladder    2. Chronic suprapubic catheter    3. Encounter for care or replacement of suprapubic tube        Plan:            I spent 25 minutes with the patient of which more than half was spent in direct consultation with the patient in regards to our treatment and plan.    Education and recommendations of today's plan of care including home remedies.  We discussed daily care; keeping surrounding tissue clean and dry.  I removed old SPT without difficulty.  I placed a new 16fr SPT easily using sterile technique.   Irrigated to verify the position.  Balloon inflated with 9cc sterile water; plug placed.  Clean dressing applied   RTC one month

## 2018-08-28 RX ORDER — METHOCARBAMOL 750 MG/1
TABLET, FILM COATED ORAL
Qty: 180 TABLET | Refills: 2 | Status: SHIPPED | OUTPATIENT
Start: 2018-08-28 | End: 2018-10-29 | Stop reason: SDUPTHER

## 2018-08-28 RX ORDER — PRAVASTATIN SODIUM 40 MG/1
TABLET ORAL
Qty: 30 TABLET | Refills: 5 | Status: SHIPPED | OUTPATIENT
Start: 2018-08-28 | End: 2018-12-17 | Stop reason: SDUPTHER

## 2018-09-04 DIAGNOSIS — G43.711 INTRACTABLE CHRONIC MIGRAINE WITHOUT AURA AND WITH STATUS MIGRAINOSUS: ICD-10-CM

## 2018-09-04 RX ORDER — SUMATRIPTAN SUCCINATE 100 MG/1
100 TABLET ORAL 2 TIMES DAILY PRN
Qty: 9 TABLET | Refills: 6 | Status: SHIPPED | OUTPATIENT
Start: 2018-09-04 | End: 2018-10-03 | Stop reason: SDUPTHER

## 2018-09-05 RX ORDER — ERGOCALCIFEROL 1.25 MG/1
CAPSULE ORAL
Qty: 12 CAPSULE | Refills: 0 | Status: SHIPPED | OUTPATIENT
Start: 2018-09-05 | End: 2018-09-06 | Stop reason: SDUPTHER

## 2018-09-05 NOTE — TELEPHONE ENCOUNTER
LOV 9-27-17  LRF 5-4-18  Pt has an annual physical apt 12-19-18  Pharmacy requesting a 90 day supply

## 2018-09-06 RX ORDER — ERGOCALCIFEROL 1.25 MG/1
CAPSULE ORAL
Qty: 12 CAPSULE | Refills: 0 | Status: SHIPPED | OUTPATIENT
Start: 2018-09-06 | End: 2019-03-13 | Stop reason: SDUPTHER

## 2018-09-13 ENCOUNTER — OFFICE VISIT (OUTPATIENT)
Dept: RHEUMATOLOGY | Facility: CLINIC | Age: 68
End: 2018-09-13
Payer: MEDICARE

## 2018-09-13 VITALS
BODY MASS INDEX: 43.8 KG/M2 | DIASTOLIC BLOOD PRESSURE: 84 MMHG | SYSTOLIC BLOOD PRESSURE: 154 MMHG | HEART RATE: 50 BPM | WEIGHT: 289 LBS | HEIGHT: 68 IN

## 2018-09-13 DIAGNOSIS — M79.7 FIBROMYALGIA: ICD-10-CM

## 2018-09-13 DIAGNOSIS — M77.8 THUMB TENDONITIS: ICD-10-CM

## 2018-09-13 DIAGNOSIS — M15.9 PRIMARY OSTEOARTHRITIS INVOLVING MULTIPLE JOINTS: Primary | ICD-10-CM

## 2018-09-13 PROCEDURE — 99213 OFFICE O/P EST LOW 20 MIN: CPT | Mod: 25,S$PBB,, | Performed by: INTERNAL MEDICINE

## 2018-09-13 PROCEDURE — 20550 NJX 1 TENDON SHEATH/LIGAMENT: CPT | Mod: PBBFAC | Performed by: INTERNAL MEDICINE

## 2018-09-13 PROCEDURE — 99999 PR PBB SHADOW E&M-EST. PATIENT-LVL III: CPT | Mod: PBBFAC,,, | Performed by: INTERNAL MEDICINE

## 2018-09-13 PROCEDURE — 99213 OFFICE O/P EST LOW 20 MIN: CPT | Mod: PBBFAC,25 | Performed by: INTERNAL MEDICINE

## 2018-09-13 RX ORDER — HYDROCODONE BITARTRATE AND ACETAMINOPHEN 10; 325 MG/1; MG/1
1 TABLET ORAL EVERY 6 HOURS PRN
Qty: 120 TABLET | Refills: 0 | Status: SHIPPED | OUTPATIENT
Start: 2018-11-08 | End: 2018-12-06 | Stop reason: SDUPTHER

## 2018-09-13 RX ORDER — HYDROCODONE BITARTRATE AND ACETAMINOPHEN 10; 325 MG/1; MG/1
1 TABLET ORAL EVERY 6 HOURS PRN
Qty: 120 TABLET | Refills: 0 | Status: SHIPPED | OUTPATIENT
Start: 2018-09-13 | End: 2018-09-13 | Stop reason: SDUPTHER

## 2018-09-13 RX ORDER — HYDROCODONE BITARTRATE AND ACETAMINOPHEN 10; 325 MG/1; MG/1
1 TABLET ORAL EVERY 6 HOURS PRN
Qty: 120 TABLET | Refills: 0 | Status: SHIPPED | OUTPATIENT
Start: 2018-10-11 | End: 2018-09-13 | Stop reason: SDUPTHER

## 2018-09-13 RX ADMIN — METHYLPREDNISOLONE ACETATE 40 MG: 80 INJECTION, SUSPENSION INTRA-ARTICULAR; INTRALESIONAL; INTRAMUSCULAR; SOFT TISSUE at 02:09

## 2018-09-13 NOTE — PROCEDURES
Tendon Sheath: L thumb MCP  Date/Time: 9/13/2018 2:05 PM  Performed by: Thor Garvey MD  Authorized by: Thor Garvey MD     Consent Done?:  Yes (Verbal)  Indications:  Pain  Site marked: the procedure site was marked    Location:  Thumb  Site:  L thumb MCP  Prep: patient was prepped and draped in usual sterile fashion    Needle size:  25 G  Medications:  40 mg methylPREDNISolone acetate 80 mg/mL  Patient tolerance:  Patient tolerated the procedure well with no immediate complications

## 2018-09-14 RX ORDER — METHYLPREDNISOLONE ACETATE 80 MG/ML
40 INJECTION, SUSPENSION INTRA-ARTICULAR; INTRALESIONAL; INTRAMUSCULAR; SOFT TISSUE
Status: DISCONTINUED | OUTPATIENT
Start: 2018-09-13 | End: 2018-09-14 | Stop reason: HOSPADM

## 2018-09-14 NOTE — PROGRESS NOTES
History of present illness:  68-year-old female I follow for chronic pain due to osteoarthritis and fibroma bulge of.  She has peripheral neuropathy and neurogenic bladder.  She has had several further Botox injections in the bladder.  She has also had some problems with her back.  She had RFA which helps some.  She was recently seen by pain management and had trigger point injections.  They really did not help that much.  She remains on methocarbamol, Effexor, trazodone, and Topamax.    She has developed a new problem with pain and locking in her left thumb.  It is been worse for the past month.  It is worse when she 1st wakes up in the morning.  She has some discomfort when she is using her hand.  It does not wake her up at night.  She has had no numbness or tingling in the hand.  She has had no similar problem in the past.  She has been using topical medications with some relief.    Physical examination:  Musculoskeletal:  Patient was examined in her wheelchair.  She has a nodule of the flexor tendon of the left thumb under the MCP.  It is tender to palpation.  She has no synovitis in the hands.  She has no tenderness on palpation of the other joints.    Assessment:  1.  Underlying osteoarthritis and chronic pain syndrome  2.  Flexor tenosynovitis of the thumb    Plans:  1.  I will see how she response to the injection  2.  I refilled her pain medications for the next 3 months  3.  Return to see me in 6 months

## 2018-09-17 ENCOUNTER — TELEPHONE (OUTPATIENT)
Dept: UROLOGY | Facility: CLINIC | Age: 68
End: 2018-09-17

## 2018-09-17 ENCOUNTER — DOCUMENTATION ONLY (OUTPATIENT)
Dept: REHABILITATION | Facility: HOSPITAL | Age: 68
End: 2018-09-17

## 2018-09-17 DIAGNOSIS — N31.9 NEUROGENIC BLADDER: Primary | ICD-10-CM

## 2018-09-17 NOTE — PROGRESS NOTES
PHYSICAL THERAPY DISCHARGE SUMMARY     Name: Cecile Bowen  Clinic Number: [id number]    Diagnosis:        Encounter Diagnoses   Name Primary?    Chronic bilateral low back pain without sciatica      Weakness generalized        Physician: April Torres MD    Past Medical History:   Diagnosis Date    Back pain     CKD (chronic kidney disease) stage 3, GFR 30-59 ml/min     as per patient    Diabetes type 2, uncontrolled 2012    DJD (degenerative joint disease) 2012    Hypertension 2012    Hypothyroidism 2012    Levoscoliosis     Migraine     Nuclear sclerosis - Both Eyes 3/24/2014    Obesity 2012       Initial visit: 2018  Date of Last visit: 2018  Date of Discharge Note:  2018  Total Visits Received: 5  Missed Visits: 2  ASSESSMENT   Status Towards Goals Met:  Pt met all short term goals.     Goals Not achieved and why: Pt did not return to clinic for more treatment/re-assessment.  Pt has not scheduled any additional visits and has not returned to therapy.     Discharge reason : Pt has not re-scheduled further follow-up sessions and POC has     G-CODE: Discharge g-code not filed due to discharge note being documentation only. Upon eval Pt was at CL (60<80%)  with g-code goal set at CK (40 < 60%).    Short Term GOALS:  In 4 weeks, pt will:  Report decreased back pain < / = 6 /10 to increase tolerance for walking long distances (MET 2018)  Pt will perform sit to stand transfer from standard height chair with no use of UEs and < or = min A in order to be independent with transfers. (MET 2018)  Pt will report > or = 20% decrease in difficulty performing household chores in order to improve functional mobility. (MET 2018)  Pt to tolerate HEP to improve ROM and independence with ADL's (MET 2018)        Long term goals  In 8 weeks, pt will:  Pt will be able to perform 5 consecutive sit to stand transfers s/ UE use on first  attempts from [facility] ht chair in order to improve transfer skills  Pt will be able to perform bridging x 5 without pain and clearing the gluts to improve standing tolerance   Pt will be able to demonstrate a proper TA contraction in order to provide stability in lumbar spine with functional tasks.  Pt demonstrate independence in HEP and POC to improve ROM and independence with ADL's.      PLAN   This patient is discharged from [facility] Therapy Services.       Adia Foy, PT

## 2018-09-25 DIAGNOSIS — G43.711 INTRACTABLE CHRONIC MIGRAINE WITHOUT AURA AND WITH STATUS MIGRAINOSUS: ICD-10-CM

## 2018-09-26 RX ORDER — TOPIRAMATE 200 MG/1
TABLET ORAL
Qty: 180 TABLET | Refills: 2 | Status: SHIPPED | OUTPATIENT
Start: 2018-09-26 | End: 2019-09-01 | Stop reason: SDUPTHER

## 2018-10-01 ENCOUNTER — ANESTHESIA EVENT (OUTPATIENT)
Dept: SURGERY | Facility: HOSPITAL | Age: 68
End: 2018-10-01
Payer: MEDICARE

## 2018-10-01 DIAGNOSIS — Z01.818 PREOPERATIVE TESTING: Primary | ICD-10-CM

## 2018-10-01 NOTE — ANESTHESIA PREPROCEDURE EVALUATION
Louisa Sevilla, RN   Registered Nurse      Pre Admission Screening   Signed                             []Hide copied text    []Ashvinver for details      Anesthesia Assessment: Preoperative EQUATION     Planned Procedure: Procedure(s) (LRB):  CYSTOSCOPY (N/A)  INJECTION, BOTULINUM TOXIN, TYPE A 300 UNITS (N/A)  Requested Anesthesia Type:Monitor Anesthesia Care  Surgeon: Ronel Whittington MD  Service: Urology  Known or anticipated Date of Surgery:10/8/2018     Surgeon notes: reviewed     Electronic QUestionnaire Assessment completed via nurse interview with patient.         No AQ     Triage considerations:      The patient has no apparent active cardiac condition (No unstable coronary Syndrome such as severe unstable angina or recent [<1 month] myocardial infarction, decompensated CHF, severe valvular   disease or significant arrhythmia)     Previous anesthesia records:GETA, MAC and No problems   5/14/18 cysto:  Airway/Jaw/Neck:  Airway Findings: Mouth Opening: Normal Tongue: Normal  General Airway Assessment: Adult  Mallampati: II  TM Distance: Normal, at least 6 cm      Dental:  Dental Findings: Edentulous         Last PCP note: 6-12 months ago , within [facility]   Subspecialty notes: Endocrinology, Nephrology, Rheumatology, Pain Management     Other important co-morbidities: HTN/HLP, DM2, Hypothyroidism, VIJAYA, HX anemia, morbid obesity, CKD 4, GERD     Tests already available:  Available tests,  3-6 months ago , within [facility] .  6/14/18 PTH, reanl function. 5/3/18 BMP and EKG. 9/2017 A1c.                            Instructions given. (See in Nurse's note)     Optimization:  Anesthesia Preop Clinic Assessment  Indicated-not required for this procedure                Plan:    Testing:  Hematology Profile and BMP                           Patient  has previously scheduled Medical Appointment:10/3 Urology     Navigation: Tests Scheduled. 10/3                        Results will be tracked by Preop Clinic.                                     Electronically signed by Louisa Sevilla RN at 10/1/2018 10:17 AM       Pre-admit on 10/8/2018            Detailed Report    10/4/18- lab results noted                                                                                                                10/01/2018  Cecile Bowen is a 68 y.o., female.    Past Medical History:   Diagnosis Date    Back pain     CKD (chronic kidney disease) stage 3, GFR 30-59 ml/min     as per patient    Diabetes type 2, uncontrolled 12/12/2012    DJD (degenerative joint disease) 12/12/2012    Hypertension 12/12/2012    Hypothyroidism 12/12/2012    Levoscoliosis     Migraine     Nuclear sclerosis - Both Eyes 3/24/2014    Obesity 12/12/2012     Past Surgical History:   Procedure Laterality Date    BLOCK-NERVE GENITOFEMORAL Left 2/20/2018    Performed by [facility] at [facility]    BLOCK-NERVE-MEDIAL BRANCH-LUMBAR Bilateral 3/15/2018    Performed by [facility] at [facility]    BLOCK-NERVE-MEDIAL BRANCH-LUMBAR Bilateral 2/20/2018    [facility]formed by [facility] at [facility]    BREAST BIOPSY Right     benign    CHOLECYSTECTOMY      COLONOSCOPY N/A 1/13/2017    Procedure: COLONOSCOPY;  Surgeon: Morris Wiseman MD;  [facility]  Service: Endoscopy;  Laterality: N/A;  Renal pt Nephrology advised to avoid phosphate preps    COLONOSCOPY N/A 1/13/2017    Performed by Morris Wiseman MD at [facility] ENDO (4TH FLR)    CYSTOSCOPY N/A 5/14/2018    Performed by Ronel [facility]ittington MD at [facility] OR 1ST FLR    CYSTOSCOPY N/A 12/14/2015    Performed by [facility] MD at [facility] OR 1ST FLR    CYSTOSCOPY N/A 5/29/2015    Performed by R[facility]ton MD at [facility] OR 2ND FLR    CYSTOSCOPY N/A 3/23/2015    Performed by [facility] MD at [facility] OR 1ST FLR    DILATION AND CURETTAGE OF UTERUS      FLUORO URODYNAMIC STUDY (FUDS) N/A 3/23/2015    Performed by Ronel FRAZIER  [facility] at [facility] OR 1ST FLR    GALLBLADDER SURGERY  2006    INJECTION-BOTOX N/A 5/14/2018    Performed by [facility] at [facility] OR 1ST FLR    INJECTION-BOTOX N/A 12/14/2015    Performed by [facility] at [facility] OR 1ST FLR    INJECTION-BOTOX N/A 5/29/2015    Performed by [facility] at [facility] OR 2ND FLR    INSERTION-CATHETER-SUPRAPUBIC CHANGE N/A 5/14/2018    Performed by [facility] at [facility] OR 1ST FLR    INSERTION-SUPRAPUBIC TUBE-OPEN N/A 5/29/2015    Performed by [facility] at [facility] OR 2ND FLR    OVARIAN CYST SURGERY  1985    RADIOFREQUENCY THERMOCOAGULATION (RFTC)-NERVE-MEDIAN BRANCH-LUMBAR Left 4/20/2018    Performed by [facility] at [facility]GT    RADIOFREQUENCY THERMOCOAGULATION (RFTC)-NERVE-MEDIAN BRANCH-LUMBAR Right 4/2/2018    Performed by [facility] at [facility]GT    spt placement      TONSILLECTOMY, ADENOIDECTOMY      TOTAL ABDOMINAL HYSTERECTOMY W/ BILATERAL SALPINGOOPHORECTOMY  1985     Review of patient's allergies indicates:  No Known Allergies    No current facility-administered medications on file prior to encounter.      Current Outpatient Medications on File Prior to Encounter   Medication Sig Dispense Refill    aspirin-acetaminophen-caffeine 250-250-65 mg (EXCEDRIN MIGRAINE) 250-250-65 mg per tablet Take 1 tablet by mouth every 6 (six) hours as needed for Pain.      cyanocobalamin, vitamin B-12, (VITAMIN B-12) 5,000 mcg Subl Place 1 tablet under the tongue once daily.      ergocalciferol (VITAMIN D2) 50,000 unit Cap TAKE 1 CAPSULE (50,000 UNITS TOTAL) BY MOUTH EVERY 7 DAYS. 12 capsule 0    ferrous sulfate 325 (65 FE) MG EC tablet Take 325 mg by mouth once daily.       gemfibrozil (LOPID) 600 MG tablet Patient takes one tablet every other day 60 tablet 3    [START ON 11/8/2018] HYDROcodone-acetaminophen (NORCO)  mg per tablet Take 1 tablet by mouth every 6 (six) hours as needed. 120  "tablet 0    insulin lispro (HUMALOG) 100 unit/mL injection Inject under the skin 7 units in the AM, 10 units for lunch, 12 units for Dinner , before meals 10 mL 11    LANTUS SOLOSTAR 100 unit/mL (3 mL) InPn pen INJECT 19 UNITS INTO THE SKIN EVERY EVENING. 15 Syringe 3    levothyroxine (SYNTHROID) 50 MCG tablet Take 1 tablet (50 mcg total) by mouth once daily. 30 tablet 11    magnesium oxide (MAG-OX) 400 mg tablet TAKE 1 TABLET (400 MG TOTAL) BY MOUTH 2 (TWO) TIMES DAILY. 180 tablet 3    methocarbamol (ROBAXIN) 750 MG Tab TAKE 1-2 TABLETS BY MOUTH 4 TIMES DAILY AS NEEDED 180 tablet 2    multivitamin with minerals tablet Take 1 tablet by mouth once daily.      pravastatin (PRAVACHOL) 40 MG tablet TAKE 1 TABLET BY MOUTH EVERY DAY 30 tablet 5    riboflavin, vitamin B2, 400 mg Tab Take 400 mg by mouth once daily. 90 tablet 3    sodium bicarbonate 650 MG tablet TAKE 1 TABLET (650 MG TOTAL) BY MOUTH 3 (THREE) TIMES DAILY. 270 tablet 3    traZODone (DESYREL) 100 MG tablet TAKE 1 TABLET BY MOUTH AT BEDTIME MAY TAKE AN EXTRA HALF TABLET IF NEEDED 45 tablet 1    venlafaxine (EFFEXOR-XR) 75 MG 24 hr capsule TAKE 2 CAPSULES BY MOUTH ONCE DAILY. 180 capsule 1    BD INSULIN PEN NEEDLE UF SHORT 31 gauge x 5/16" Ndle USE ONCE DAILY WITH LANTUS SOLOSTAR PEN INSULIN 150 each 3    BD INSULIN SYRINGE ULTRA-FINE 1/2 mL 31 gauge x 15/64" Syrg USE WITH INSULIN 4 TIMES A  Syringe 12    blood sugar diagnostic Strp ONE TOUCH ULTRA TEST STRIPS//Check blood sugars  strip 6    blood-glucose meter kit ONE TOUCH ULTRA 1 each 0    cloNIDine (CATAPRES) 0.1 MG tablet Take 1 tablet (0.1 mg total) by mouth once. 30 tablet 3    lancets (ONE TOUCH DELICA LANCETS) Misc Ultra 2 lancets to check blood sugar  each 6    oxybutynin (DITROPAN-XL) 10 MG 24 hr tablet TAKE 1 TABLET (10 MG TOTAL) BY MOUTH ONCE DAILY. 30 tablet 9    scopolamine (TRANSDERM-SCOP) 1.3-1.5 mg (1 mg over 3 days) Place 1 patch onto the skin every 72 " hours. 4 patch 0    sumatriptan (IMITREX) 100 MG tablet Take 1 tablet (100 mg total) by mouth 2 (two) times daily as needed for Migraine. 9 tablet 11     Lab Results   Component Value Date    WBC 9.80 10/03/2018    HGB 14.5 10/03/2018    HCT 43.8 10/03/2018    MCV 89 10/03/2018     10/03/2018     BMP  Lab Results   Component Value Date     (L) 10/03/2018    K 3.9 10/03/2018     10/03/2018    CO2 19 (L) 10/03/2018    BUN 22 10/03/2018    CREATININE 2.3 (H) 10/03/2018    CALCIUM 9.0 10/03/2018    ANIONGAP 12 10/03/2018    ESTGFRAFRICA 24.8 (A) 10/03/2018    EGFRNONAA 21.5 (A) 10/03/2018         Anesthesia Evaluation    I have reviewed the Patient Summary Reports.     I have reviewed the Medications.     Review of Systems  Anesthesia Hx:  No problems with previous Anesthesia  History of prior surgery of interest to airway management or planning: Previous anesthesia: MAC  5/14/18 cysto with MAC.  Procedure performed at an [facility]. Denies Family Hx of Anesthesia complications.   Denies Personal Hx of Anesthesia complications.   Hematology/Oncology:     Oncology Normal    -- Anemia: Iron Deficiency Anemia   EENT/Dental:   Jaw Problems:  Clicking (TMJ)   Cardiovascular:   Exercise tolerance: poor Denies Pacemaker. Hypertension  Denies MI.   Denies CABG/stent.   Denies Angina. hyperlipidemia  Functional Capacity 2 METS  Hypertension (has prn clonidine for BP>170-used once in Feb 2018)    Pulmonary:   Denies COPD.  Denies Asthma.  Denies Shortness of breath.  Denies Recent URI. Sleep Apnea  Obstructive Sleep Apnea (VIJAYA), CPAP prescribed.   Renal/:   Chronic Renal Disease, CRI  Renal Symptoms/Infections/Stones: frequency.  Kidney Function/Disease, Chronic Kidney Disease (CKD) , CKD Stage III (GFR 30-59)  Other Renal / Gu Conditions: (neurogenic bladder)   Hepatic/GI:  Hepatic/GI Normal  Esophageal / Stomach Disorders Gerd Controlled by PRN antireflux medication.     Musculoskeletal:  Musculoskeletal General/Symptoms: joint pain, low back pain, neck pain. Functional capacity is ambulatory with cane. Or uses scooter for long distances   Neurological:   Denies CVA. Denies Seizures.  Dx of Headaches, Migraine Headache Pain , onset is chronic , alleviating factors are norco 3/day. Pain Etiology/Diagnosis, Arthritis, Low Back Pain    Endocrine:   Diabetes  Diabetes, Type 2 Diabetes , controlled by insulin. Typical AM glucose range: 150-350 , most recent HgA1c value was 7.5 on 9/2017.  Thyroid Disease Hypothyroidism  Metabolic Disorders, Morbid Obesity / BMI > 40  Psych:   depression          Physical Exam  General:  Morbid Obesity    Airway/Jaw/Neck:  Airway Findings: Mouth Opening: Normal Tongue: Normal  General Airway Assessment: Adult  Mallampati: II  TM Distance: Normal, at least 6 cm  Jaw/Neck Findings:  Neck ROM: Normal ROM      Dental:  Dental Findings: Edentulous        Mental Status:  Mental Status Findings:  Cooperative, Alert and Oriented         Anesthesia Plan  Type of Anesthesia, risks & benefits discussed:  Anesthesia Type:  MAC, general  Patient's Preference:   Intra-op Monitoring Plan: standard ASA monitors  Intra-op Monitoring Plan Comments:   Post Op Pain Control Plan: per primary service following discharge from PACU and IV/PO Opioids PRN  Post Op Pain Control Plan Comments:   Induction:   IV  Beta Blocker:  Patient is not currently on a Beta-Blocker (No further documentation required).       Informed Consent: Patient understands risks and agrees with Anesthesia plan.  Questions answered. Anesthesia consent signed with patient.  ASA Score: 3     Day of Surgery Review of History & Physical:    H&P update referred to the surgeon.     Anesthesia Plan Notes: Propofol        Ready For Surgery From Anesthesia Perspective.

## 2018-10-01 NOTE — PRE ADMISSION SCREENING
Anesthesia Assessment: Preoperative EQUATION    Planned Procedure: Procedure(s) (LRB):  CYSTOSCOPY (N/A)  INJECTION, BOTULINUM TOXIN, TYPE A 300 UNITS (N/A)  Requested Anesthesia Type:Monitor Anesthesia Care  Surgeon: Ronel Whittington MD  Service: Urology  Known or anticipated Date of Surgery:10/8/2018    Surgeon notes: reviewed    Electronic QUestionnaire Assessment completed via nurse interview with patient.        No AQ    Triage considerations:     The patient has no apparent active cardiac condition (No unstable coronary Syndrome such as severe unstable angina or recent [<1 month] myocardial infarction, decompensated CHF, severe valvular   disease or significant arrhythmia)    Previous anesthesia records:GETA, MAC and No problems   5/14/18 cysto:  Airway/Jaw/Neck:  Airway Findings: Mouth Opening: Normal Tongue: Normal  General Airway Assessment: Adult  Mallampati: II  TM Distance: Normal, at least 6 cm      Dental:  Dental Findings: Edentulous       Last PCP note: 6-12 months ago , within [facility]   Subspecialty notes: Endocrinology, Nephrology, Rheumatology, Pain Management    Other important co-morbidities: HTN/HLP, DM2, Hypothyroidism, VIJAYA, HX anemia, morbid obesity, CKD 4, GERD     Tests already available:  Available tests,  3-6 months ago , within [facility] .  6/14/18 PTH, reanl function. 5/3/18 BMP and EKG. 9/2017 A1c.            Instructions given. (See in Nurse's note)    Optimization:  Anesthesia Preop Clinic Assessment  Indicated-not required for this procedure      Plan:    Testing:  Hematology Profile and BMP     Patient  has previously scheduled Medical Appointment:10/3 Urology    Navigation: Tests Scheduled. 10/3             Results will be tracked by Preop Clinic.

## 2018-10-03 ENCOUNTER — LAB VISIT (OUTPATIENT)
Dept: LAB | Facility: HOSPITAL | Age: 68
End: 2018-10-03
Attending: ANESTHESIOLOGY
Payer: MEDICARE

## 2018-10-03 ENCOUNTER — OFFICE VISIT (OUTPATIENT)
Dept: UROLOGY | Facility: CLINIC | Age: 68
End: 2018-10-03
Payer: MEDICARE

## 2018-10-03 VITALS
BODY MASS INDEX: 43.8 KG/M2 | DIASTOLIC BLOOD PRESSURE: 83 MMHG | HEART RATE: 89 BPM | SYSTOLIC BLOOD PRESSURE: 122 MMHG | HEIGHT: 68 IN | WEIGHT: 289 LBS

## 2018-10-03 DIAGNOSIS — N31.9 NEUROGENIC BLADDER: ICD-10-CM

## 2018-10-03 DIAGNOSIS — Z43.5 ENCOUNTER FOR CARE OR REPLACEMENT OF SUPRAPUBIC TUBE: ICD-10-CM

## 2018-10-03 DIAGNOSIS — Z01.818 PREOPERATIVE TESTING: ICD-10-CM

## 2018-10-03 DIAGNOSIS — N32.89 BLADDER SPASMS: ICD-10-CM

## 2018-10-03 DIAGNOSIS — R82.71 BACTERIA IN URINE: Primary | ICD-10-CM

## 2018-10-03 DIAGNOSIS — Z93.59 CHRONIC SUPRAPUBIC CATHETER: ICD-10-CM

## 2018-10-03 LAB
ANION GAP SERPL CALC-SCNC: 12 MMOL/L
BUN SERPL-MCNC: 22 MG/DL
CALCIUM SERPL-MCNC: 9 MG/DL
CHLORIDE SERPL-SCNC: 103 MMOL/L
CO2 SERPL-SCNC: 19 MMOL/L
CREAT SERPL-MCNC: 2.3 MG/DL
ERYTHROCYTE [DISTWIDTH] IN BLOOD BY AUTOMATED COUNT: 12.9 %
EST. GFR  (AFRICAN AMERICAN): 24.8 ML/MIN/1.73 M^2
EST. GFR  (NON AFRICAN AMERICAN): 21.5 ML/MIN/1.73 M^2
GLUCOSE SERPL-MCNC: 251 MG/DL
HCT VFR BLD AUTO: 43.8 %
HGB BLD-MCNC: 14.5 G/DL
MCH RBC QN AUTO: 29.4 PG
MCHC RBC AUTO-ENTMCNC: 33.1 G/DL
MCV RBC AUTO: 89 FL
PLATELET # BLD AUTO: 325 K/UL
PMV BLD AUTO: 9.6 FL
POTASSIUM SERPL-SCNC: 3.9 MMOL/L
RBC # BLD AUTO: 4.93 M/UL
SODIUM SERPL-SCNC: 134 MMOL/L
WBC # BLD AUTO: 9.8 K/UL

## 2018-10-03 PROCEDURE — 99213 OFFICE O/P EST LOW 20 MIN: CPT | Mod: PBBFAC,25 | Performed by: NURSE PRACTITIONER

## 2018-10-03 PROCEDURE — 51705 CHANGE OF BLADDER TUBE: CPT | Mod: S$PBB,,, | Performed by: NURSE PRACTITIONER

## 2018-10-03 PROCEDURE — 80048 BASIC METABOLIC PNL TOTAL CA: CPT

## 2018-10-03 PROCEDURE — 51705 CHANGE OF BLADDER TUBE: CPT | Mod: PBBFAC | Performed by: NURSE PRACTITIONER

## 2018-10-03 PROCEDURE — 99214 OFFICE O/P EST MOD 30 MIN: CPT | Mod: S$PBB,25,, | Performed by: NURSE PRACTITIONER

## 2018-10-03 PROCEDURE — 99999 PR PBB SHADOW E&M-EST. PATIENT-LVL III: CPT | Mod: PBBFAC,,, | Performed by: NURSE PRACTITIONER

## 2018-10-03 PROCEDURE — 87086 URINE CULTURE/COLONY COUNT: CPT

## 2018-10-03 PROCEDURE — 85027 COMPLETE CBC AUTOMATED: CPT

## 2018-10-03 PROCEDURE — 36415 COLL VENOUS BLD VENIPUNCTURE: CPT

## 2018-10-03 RX ORDER — CIPROFLOXACIN 500 MG/1
500 TABLET ORAL 2 TIMES DAILY
Qty: 14 TABLET | Refills: 0 | Status: SHIPPED | OUTPATIENT
Start: 2018-10-03 | End: 2018-10-10

## 2018-10-03 NOTE — PROGRESS NOTES
Subjective:       Patient ID: Cecile Bowen is a 68 y.o. female.    Chief Complaint: neurogenic bladder (spt change)    Cecile Bowen is a 68 y.o. Female with history of bilateral hydronephrosis, incomplete bladder emptying which is managed by SPT (16fr).  Her last SPT change was 08/23/2018.     05/14/2018 Procedure(s) Performed:   1.  Cystoscopy with bladder botox injection (300u)  2.  Suprapubic catheter change.     Here today for scheduled SPT change and collect urine due to repeat Botox this Monday.   SPT is draining well.  Bladder spasm are greatly reduced; pleased with botox.    No fever, n/v              Past Medical History:    Diabetes type 2, uncontrolled                   12/12/2012    Obesity                                         12/12/2012    Hypertension                                    12/12/2012    DJD (degenerative joint disease)                12/12/2012    Hypothyroidism                                  12/12/2012    Nuclear sclerosis - Both Eyes                   3/24/2014     Migraine                                                      Past Surgical History:    TOTAL ABDOMINAL HYSTERECTOMY W/ BILATERAL SALP*  1985          GALLBLADDER SURGERY                              2006          TONSILLECTOMY, ADENOIDECTOMY                                   OVARIAN CYST SURGERY                             1985          HYSTERECTOMY                                                   DILATION AND CURETTAGE OF UTERUS                               Review of patient's family history indicates:    Diabetes                       Sister                    Kidney disease                 Sister                    ALS                            Mother                      Comment: d.    Cancer                         Maternal Grandmother        Comment: d. colon    Cancer                         Paternal Grandfather        Comment: d. lung    Diabetes                       Maternal Aunt             Kidney  "disease                 Maternal Aunt             Diabetes                       Maternal Uncle            Amblyopia                      Neg Hx                    Blindness                      Neg Hx                    Cataracts                      Neg Hx                    Glaucoma                       Neg Hx                    Macular degeneration           Neg Hx                    Retinal detachment             Neg Hx                    Strabismus                     Neg Hx                      Social History    Marital Status:             Spouse Name:                       Years of Education:                 Number of children:               Occupational History    None on file    Social History Main Topics    Smoking Status: Never Smoker                      Smokeless Status: Never Used                        Alcohol Use: No              Drug Use: No              Sexual Activity: Not Currently           Birth Control/Protection: Abstinence    Other Topics            Concern    None on file    Social History Narrative    Single      Lives w/ sister  And brother     both are helping her during her post hosp recovery period        Allergies:  Review of patient's allergies indicates no known allergies.    Medications:  Current outpatient prescriptions:amitriptyline (ELAVIL) 10 MG tablet, TAKE 3 TABLETS BY MOUTH IN THE EVENING, Disp: 90 tablet, Rfl: 5;  aspirin (ECOTRIN) 81 MG EC tablet, Take 81 mg by mouth once daily., Disp: , Rfl: ;  BD INSULIN PEN NEEDLE UF SHORT 31 X 5/16 " Ndle, USE ONCE DAILY WITH LANTUS SOLOSTAR PEN INSULIN, Disp: 100 each, Rfl: 11  butalbital-acetaminophen-caffeine -40 mg (FIORICET, ESGIC) -40 mg per tablet, TAKE 1 TABLET EVERY 4 TO 6 HOURS, Disp: 30 tablet, Rfl: 0;  cyanocobalamin, vitamin B-12, (VITAMIN B-12) 2,500 mcg Subl, Place 2,500 mcg under the tongue once daily., Disp: , Rfl: ;  diphenhydrAMINE (BENADRYL) 25 mg capsule, Take 25 mg by mouth every 6 (six) " "hours as needed., Disp: , Rfl:   ferrous sulfate 325 (65 FE) MG EC tablet, Take 325 mg by mouth 3 (three) times daily with meals., Disp: , Rfl: ;  fluticasone (FLONASE) 50 mcg/actuation nasal spray, 1 SPRAY IN EACH NOSTRIL DAILY (Patient taking differently: 1 SPRAY IN EACH NOSTRIL DAILY PRN), Disp: 16 g, Rfl: 1;  furosemide (LASIX) 20 MG tablet, Take 1 tablet (20 mg total) by mouth once daily., Disp: 4 tablet, Rfl: 0  gemfibrozil (LOPID) 600 MG tablet, TAKE 1 TABLET BY MOUTH TWICE A DAY, Disp: 60 tablet, Rfl: 5;  (START ON 4/29/2015) hydrocodone-acetaminophen 10-325mg (NORCO)  mg Tab, Take 1 tablet by mouth every 6 (six) hours as needed., Disp: 120 tablet, Rfl: 0;  insulin lispro (HUMALOG) 100 unit/mL injection, Inject 7 Units into the skin 3 (three) times daily before meals., Disp: 6.3 mL, Rfl: 11  insulin syringe-needle U-100 (BD INSULIN SYRINGE ULTRA-FINE) 1/2 mL 31 x 15/64" Syrg, 1 Box by Misc.(Non-Drug; Combo Route) route 4 (four) times daily., Disp: 100 Syringe, Rfl: 12;  lancets (ONE TOUCH DELICA LANCETS) Misc, Ultra 2 lancets to check blood sugar BID, Disp: 100 each, Rfl: 6;  LANTUS SOLOSTAR 100 unit/mL (3 mL) InPn pen, , Disp: , Rfl: 3  LIDODERM 5 %(700 mg/patch), APPLY 1 PATCH TO SKIN DAILY (LEAVING ON FOR 12 HOURS AND OFF FOR 12 HOURS), Disp: 30 patch, Rfl: 6;  magnesium oxide (MAG-OX) 400 mg tablet, TAKE 1 TABLET (400 MG TOTAL) BY MOUTH 2 (TWO) TIMES DAILY., Disp: 60 tablet, Rfl: 11;  methocarbamol (ROBAXIN) 750 MG Tab, TAKE 1 TO 2 TABLETS BY MOUTH 3 TIMES A DAY (Patient taking differently: Take 2 tablets twice daily), Disp: 120 tablet, Rfl: 3  methocarbamol (ROBAXIN) 750 MG Tab, TAKE 1 TO 2 TABLETS BY MOUTH 3 TIMES A DAY, Disp: 120 tablet, Rfl: 3;  methylPREDNISolone (MEDROL DOSEPACK) 4 mg tablet, use as directed, Disp: 21 tablet, Rfl: 0;  multivitamin with minerals tablet, Take 1 tablet by mouth once daily., Disp: , Rfl: ;  pravastatin (PRAVACHOL) 40 MG tablet, TAKE 1 TABLET BY MOUTH EVERY DAY, " Disp: 30 tablet, Rfl: 2  pyridoxine (B-6) 100 MG Tab, Take 1 tablet (100 mg total) by mouth once daily., Disp: 30 tablet, Rfl: 12;  scopolamine (TRANSDERM-SCOP) 1.5 mg, Place 1 patch (1.5 mg total) onto the skin every 72 hours., Disp: 4 patch, Rfl: 0;  sulfamethoxazole-trimethoprim 800-160mg (BACTRIM DS) 800-160 mg Tab, Take 1 tablet by mouth 2 (two) times daily., Disp: , Rfl: 0  sumatriptan (IMITREX) 100 MG tablet, TAKE 1 TABLET (100 MG TOTAL) BY MOUTH ONCE., Disp: 9 tablet, Rfl: 6;  SYNTHROID 125 mcg tablet, TAKE 1 TABLET BY MOUTH DAILY, Disp: 90 tablet, Rfl: 4;  topiramate (TOPAMAX) 100 MG tablet, TAKE 1 TABLET (100 MG TOTAL) BY MOUTH 2 (TWO) TIMES DAILY., Disp: 60 tablet, Rfl: 11;  topiramate (TOPAMAX) 25 MG tablet, Take 4 tablets (100 mg total) by mouth 2 (two) times daily., Disp: 240 tablet, Rfl: 5  venlafaxine (EFFEXOR-XR) 150 MG Cp24, TAKE 1 CAPSULE BY MOUTH ONCE DAILY, Disp: 30 capsule, Rfl: 2;  vitamin D (VITAMIN D3) 185 MG Tab, Take 185 mg by mouth once daily., Disp: , Rfl:           Review of Systems   Constitutional: Negative.  Negative for chills and fever.   HENT: Negative for facial swelling and trouble swallowing.    Eyes: Negative for visual disturbance.   Respiratory: Negative for cough, chest tightness, shortness of breath and wheezing.    Cardiovascular: Negative for chest pain and palpitations.   Gastrointestinal: Negative for abdominal pain, constipation, nausea and vomiting.   Genitourinary: Positive for difficulty urinating and urgency. Negative for dysuria, hematuria and vaginal bleeding.        SPT draining well; bladder spasms.     Musculoskeletal: Positive for arthralgias and gait problem.        Fibromyalgia    Skin: Negative for rash.   Neurological: Negative for dizziness and headaches.   Hematological: Does not bruise/bleed easily.   Psychiatric/Behavioral: Negative for behavioral problems.       Objective:      Physical Exam   Nursing note and vitals reviewed.  Constitutional: She  is oriented to person, place, and time. She appears well-developed and well-nourished.   HENT:   Head: Normocephalic.   Right Ear: External ear normal.   Left Ear: External ear normal.   Nose: Nose normal.   Eyes: Conjunctivae and lids are normal. Right eye exhibits no discharge. Left eye exhibits no discharge. No scleral icterus.   Neck: Trachea normal and normal range of motion. Neck supple. No tracheal deviation present.   Cardiovascular: Normal rate, regular rhythm and intact distal pulses.    Pulmonary/Chest: Effort normal. No respiratory distress.   Abdominal: Soft. Bowel sounds are normal. She exhibits no distension and no mass. There is no tenderness. There is no rebound and no guarding.       Musculoskeletal: Normal range of motion. She exhibits no edema.   Neurological: She is alert and oriented to person, place, and time.   Skin: Skin is warm, dry and intact.     Psychiatric: She has a normal mood and affect. Her behavior is normal. Judgment and thought content normal.       Assessment:       1. Bacteria in urine    2. Bladder spasms    3. Neurogenic bladder    4. Chronic suprapubic catheter    5. Encounter for care or replacement of suprapubic tube        Plan:         I spent 25 minutes with the patient of which more than half was spent in direct consultation with the patient in regards to our treatment and plan.    Education and recommendations of today's plan of care including home remedies.  I easily removed her old SPT then replaced with a new 16fr SPT using sterile technique.  Collected urine for cx  Irrigated bladder to verify the position.  Balloon inflated with ~10cc sterile water.  Tolerated well.  Will notify of needed antibiotics to ensure bladder clear of surgery.

## 2018-10-04 ENCOUNTER — TELEPHONE (OUTPATIENT)
Dept: UROLOGY | Facility: CLINIC | Age: 68
End: 2018-10-04

## 2018-10-04 LAB
BACTERIA UR CULT: NORMAL
BACTERIA UR CULT: NORMAL

## 2018-10-04 NOTE — TELEPHONE ENCOUNTER
Patient notified that her urine is not back but I sent an antibiotic to her pharmacy to start.   Did this because her cx may not be back in time to treat her since surgery is Monday.

## 2018-10-05 ENCOUNTER — TELEPHONE (OUTPATIENT)
Dept: UROLOGY | Facility: CLINIC | Age: 68
End: 2018-10-05

## 2018-10-08 ENCOUNTER — TELEPHONE (OUTPATIENT)
Dept: UROLOGY | Facility: CLINIC | Age: 68
End: 2018-10-08

## 2018-10-08 ENCOUNTER — ANESTHESIA (OUTPATIENT)
Dept: SURGERY | Facility: HOSPITAL | Age: 68
End: 2018-10-08
Payer: MEDICARE

## 2018-10-08 ENCOUNTER — HOSPITAL ENCOUNTER (OUTPATIENT)
Facility: HOSPITAL | Age: 68
Discharge: HOME | End: 2018-10-08
Attending: UROLOGY | Admitting: UROLOGY
Payer: MEDICARE

## 2018-10-08 VITALS
WEIGHT: 288 LBS | DIASTOLIC BLOOD PRESSURE: 75 MMHG | SYSTOLIC BLOOD PRESSURE: 171 MMHG | HEIGHT: 68 IN | TEMPERATURE: 98 F | HEART RATE: 68 BPM | OXYGEN SATURATION: 97 % | RESPIRATION RATE: 16 BRPM | BODY MASS INDEX: 43.65 KG/M2

## 2018-10-08 DIAGNOSIS — N31.9 NEUROGENIC BLADDER: ICD-10-CM

## 2018-10-08 LAB
ANION GAP SERPL CALC-SCNC: 5 MMOL/L
BUN SERPL-MCNC: 22 MG/DL
CALCIUM SERPL-MCNC: 8.5 MG/DL
CHLORIDE SERPL-SCNC: 110 MMOL/L
CO2 SERPL-SCNC: 22 MMOL/L
CREAT SERPL-MCNC: 1.9 MG/DL
EST. GFR  (AFRICAN AMERICAN): 31.2 ML/MIN/1.73 M^2
EST. GFR  (NON AFRICAN AMERICAN): 27.1 ML/MIN/1.73 M^2
GLUCOSE SERPL-MCNC: 209 MG/DL
POCT GLUCOSE: 191 MG/DL (ref 70–110)
POCT GLUCOSE: 243 MG/DL (ref 70–110)
POCT GLUCOSE: 255 MG/DL (ref 70–110)
POCT GLUCOSE: 333 MG/DL (ref 70–110)
POCT GLUCOSE: 373 MG/DL (ref 70–110)
POTASSIUM SERPL-SCNC: 4.2 MMOL/L
SODIUM SERPL-SCNC: 137 MMOL/L

## 2018-10-08 PROCEDURE — 37000009 HC ANESTHESIA EA ADD 15 MINS: Performed by: UROLOGY

## 2018-10-08 PROCEDURE — 52287 CYSTOSCOPY CHEMODENERVATION: CPT | Mod: GC,,, | Performed by: UROLOGY

## 2018-10-08 PROCEDURE — D9220A PRA ANESTHESIA: Mod: CRNA,,, | Performed by: NURSE ANESTHETIST, CERTIFIED REGISTERED

## 2018-10-08 PROCEDURE — 71000015 HC POSTOP RECOV 1ST HR: Performed by: UROLOGY

## 2018-10-08 PROCEDURE — 25000003 PHARM REV CODE 250: Performed by: UROLOGY

## 2018-10-08 PROCEDURE — 25000003 PHARM REV CODE 250: Performed by: ANESTHESIOLOGY

## 2018-10-08 PROCEDURE — D9220A PRA ANESTHESIA: Mod: ANES,,, | Performed by: ANESTHESIOLOGY

## 2018-10-08 PROCEDURE — 25000003 PHARM REV CODE 250: Performed by: STUDENT IN AN ORGANIZED HEALTH CARE EDUCATION/TRAINING PROGRAM

## 2018-10-08 PROCEDURE — 63600175 PHARM REV CODE 636 W HCPCS: Performed by: ANESTHESIOLOGY

## 2018-10-08 PROCEDURE — 36000706: Performed by: UROLOGY

## 2018-10-08 PROCEDURE — 63600175 PHARM REV CODE 636 W HCPCS: Performed by: NURSE ANESTHETIST, CERTIFIED REGISTERED

## 2018-10-08 PROCEDURE — 82962 GLUCOSE BLOOD TEST: CPT | Performed by: UROLOGY

## 2018-10-08 PROCEDURE — 71000044 HC DOSC ROUTINE RECOVERY FIRST HOUR: Performed by: UROLOGY

## 2018-10-08 PROCEDURE — 63600175 PHARM REV CODE 636 W HCPCS: Performed by: STUDENT IN AN ORGANIZED HEALTH CARE EDUCATION/TRAINING PROGRAM

## 2018-10-08 PROCEDURE — 82962 GLUCOSE BLOOD TEST: CPT | Mod: 91 | Performed by: UROLOGY

## 2018-10-08 PROCEDURE — 71000016 HC POSTOP RECOV ADDL HR: Performed by: UROLOGY

## 2018-10-08 PROCEDURE — 37000008 HC ANESTHESIA 1ST 15 MINUTES: Performed by: UROLOGY

## 2018-10-08 PROCEDURE — 80048 BASIC METABOLIC PNL TOTAL CA: CPT

## 2018-10-08 PROCEDURE — 63600175 PHARM REV CODE 636 W HCPCS: Mod: JG | Performed by: UROLOGY

## 2018-10-08 PROCEDURE — 36000707: Performed by: UROLOGY

## 2018-10-08 RX ORDER — TRAMADOL HYDROCHLORIDE 50 MG/1
50 TABLET ORAL EVERY 6 HOURS PRN
Qty: 5 TABLET | Refills: 0 | Status: SHIPPED | OUTPATIENT
Start: 2018-10-08 | End: 2018-10-18

## 2018-10-08 RX ORDER — PROPOFOL 10 MG/ML
VIAL (ML) INTRAVENOUS CONTINUOUS PRN
Status: DISCONTINUED | OUTPATIENT
Start: 2018-10-08 | End: 2018-10-08

## 2018-10-08 RX ORDER — LIDOCAINE HYDROCHLORIDE 20 MG/ML
JELLY TOPICAL
Status: DISCONTINUED | OUTPATIENT
Start: 2018-10-08 | End: 2018-10-08 | Stop reason: HOSPADM

## 2018-10-08 RX ORDER — LIDOCAINE HYDROCHLORIDE 10 MG/ML
1 INJECTION, SOLUTION EPIDURAL; INFILTRATION; INTRACAUDAL; PERINEURAL ONCE
Status: COMPLETED | OUTPATIENT
Start: 2018-10-08 | End: 2018-10-08

## 2018-10-08 RX ORDER — ONDANSETRON 8 MG/1
8 TABLET, ORALLY DISINTEGRATING ORAL EVERY 8 HOURS PRN
Status: DISCONTINUED | OUTPATIENT
Start: 2018-10-08 | End: 2018-10-08 | Stop reason: HOSPADM

## 2018-10-08 RX ORDER — SODIUM CHLORIDE 9 MG/ML
INJECTION, SOLUTION INTRAVENOUS ONCE
Status: COMPLETED | OUTPATIENT
Start: 2018-10-08 | End: 2018-10-08

## 2018-10-08 RX ORDER — HYDROCODONE BITARTRATE AND ACETAMINOPHEN 5; 325 MG/1; MG/1
1 TABLET ORAL EVERY 4 HOURS PRN
Status: DISCONTINUED | OUTPATIENT
Start: 2018-10-08 | End: 2018-10-08

## 2018-10-08 RX ORDER — PROPOFOL 10 MG/ML
VIAL (ML) INTRAVENOUS
Status: DISCONTINUED | OUTPATIENT
Start: 2018-10-08 | End: 2018-10-08

## 2018-10-08 RX ORDER — FENTANYL CITRATE 50 UG/ML
INJECTION, SOLUTION INTRAMUSCULAR; INTRAVENOUS
Status: DISCONTINUED | OUTPATIENT
Start: 2018-10-08 | End: 2018-10-08

## 2018-10-08 RX ORDER — SODIUM CHLORIDE 0.9 % (FLUSH) 0.9 %
3 SYRINGE (ML) INJECTION
Status: DISCONTINUED | OUTPATIENT
Start: 2018-10-08 | End: 2018-10-08 | Stop reason: HOSPADM

## 2018-10-08 RX ORDER — SODIUM CHLORIDE 9 MG/ML
INJECTION, SOLUTION INTRAVENOUS CONTINUOUS
Status: DISCONTINUED | OUTPATIENT
Start: 2018-10-08 | End: 2018-10-08 | Stop reason: HOSPADM

## 2018-10-08 RX ORDER — MIDAZOLAM HYDROCHLORIDE 1 MG/ML
INJECTION, SOLUTION INTRAMUSCULAR; INTRAVENOUS
Status: DISCONTINUED | OUTPATIENT
Start: 2018-10-08 | End: 2018-10-08

## 2018-10-08 RX ORDER — HYDROCODONE BITARTRATE AND ACETAMINOPHEN 10; 325 MG/1; MG/1
1 TABLET ORAL EVERY 6 HOURS PRN
Status: DISCONTINUED | OUTPATIENT
Start: 2018-10-08 | End: 2018-10-08 | Stop reason: HOSPADM

## 2018-10-08 RX ORDER — LIDOCAINE HCL/PF 100 MG/5ML
SYRINGE (ML) INTRAVENOUS
Status: DISCONTINUED | OUTPATIENT
Start: 2018-10-08 | End: 2018-10-08

## 2018-10-08 RX ORDER — FENTANYL CITRATE 50 UG/ML
25 INJECTION, SOLUTION INTRAMUSCULAR; INTRAVENOUS EVERY 5 MIN PRN
Status: DISCONTINUED | OUTPATIENT
Start: 2018-10-08 | End: 2018-10-08 | Stop reason: HOSPADM

## 2018-10-08 RX ADMIN — SODIUM CHLORIDE: 0.9 INJECTION, SOLUTION INTRAVENOUS at 11:10

## 2018-10-08 RX ADMIN — GENTAMICIN SULFATE 160 MG: 40 INJECTION, SOLUTION INTRAMUSCULAR; INTRAVENOUS at 12:10

## 2018-10-08 RX ADMIN — FENTANYL CITRATE 25 MCG: 50 INJECTION, SOLUTION INTRAMUSCULAR; INTRAVENOUS at 12:10

## 2018-10-08 RX ADMIN — SODIUM CHLORIDE: 0.9 INJECTION, SOLUTION INTRAVENOUS at 10:10

## 2018-10-08 RX ADMIN — LIDOCAINE HYDROCHLORIDE 50 MG: 20 INJECTION, SOLUTION INTRAVENOUS at 12:10

## 2018-10-08 RX ADMIN — MIDAZOLAM HYDROCHLORIDE 2 MG: 1 INJECTION, SOLUTION INTRAMUSCULAR; INTRAVENOUS at 11:10

## 2018-10-08 RX ADMIN — INSULIN HUMAN 5 UNITS: 100 INJECTION, SOLUTION PARENTERAL at 01:10

## 2018-10-08 RX ADMIN — PROPOFOL 20 MG: 10 INJECTION, EMULSION INTRAVENOUS at 12:10

## 2018-10-08 RX ADMIN — INSULIN HUMAN 3 UNITS: 100 INJECTION, SOLUTION PARENTERAL at 12:10

## 2018-10-08 RX ADMIN — PROPOFOL 100 MCG/KG/MIN: 10 INJECTION, EMULSION INTRAVENOUS at 12:10

## 2018-10-08 RX ADMIN — INSULIN HUMAN 10 UNITS: 100 INJECTION, SOLUTION PARENTERAL at 11:10

## 2018-10-08 RX ADMIN — AMPICILLIN SODIUM 1 G: 1 INJECTION, POWDER, FOR SOLUTION INTRAMUSCULAR; INTRAVENOUS at 12:10

## 2018-10-08 RX ADMIN — HYDROCODONE BITARTRATE AND ACETAMINOPHEN 1 TABLET: 10; 325 TABLET ORAL at 01:10

## 2018-10-08 RX ADMIN — LIDOCAINE HYDROCHLORIDE 10 MG: 10 INJECTION, SOLUTION EPIDURAL; INFILTRATION; INTRACAUDAL at 10:10

## 2018-10-08 NOTE — TRANSFER OF CARE
"Anesthesia Transfer of Care Note    Patient: Cecile Bowen    Procedure(s) Performed: Procedure(s) (LRB):  CYSTOSCOPY (N/A)  INJECTION, BOTULINUM TOXIN, TYPE A 300 UNITS (N/A)    Patient location: PACU    Anesthesia Type: general    Transport from OR: Transported from OR on 6-10 L/min O2 by face mask with adequate spontaneous ventilation    Post pain: adequate analgesia    Post assessment: no apparent anesthetic complications    Post vital signs: stable    Level of consciousness: awake and sedated    Nausea/Vomiting: no nausea/vomiting    Complications: none    Transfer of care protocol was followed      Last vitals:   Visit Vitals  BP (!) 133/59 (BP Location: Left arm, Patient Position: Lying)   Pulse 73   Temp 36.9 °C (98.4 °F) (Temporal)   Resp 20   Ht 5' 8" (1.727 m)   Wt 130.6 kg (288 lb)   SpO2 100%   Breastfeeding? No   BMI 43.79 kg/m²     "

## 2018-10-08 NOTE — PROGRESS NOTES
Call placed to Dr Verdin, informed MD of pt's . Pt denies feeling any symptoms at this time. Orders received for 1 liter bolus of normal saline and one time dose of 10 units of regular insulin IV, recheck CBG in 30 mins.

## 2018-10-08 NOTE — OP NOTE
[facility]  Operative Note    Date: 10/08/2018    Pre-Op Diagnosis: neurogenic bladder, hydronephrosis    Patient Active Problem List    Diagnosis Date Noted    Chronic bilateral low back pain without sciatica 05/16/2018    Weakness generalized 05/16/2018    Hydronephrosis 05/14/2018    Chronic pain 03/15/2018    Lumbar spondylosis 02/20/2018    Long term prescription opiate use 01/26/2017    Medication overuse headache 01/20/2017    Chronic daily headache 01/20/2017    Special screening for malignant neoplasms, colon 01/13/2017    Osteopenia 01/09/2017    Morbid obesity due to excess calories 01/09/2017    Pulmonary nodule 11/10/2016    Chronic suprapubic catheter 09/19/2016    Uncontrolled type 2 diabetes mellitus with chronic kidney disease, with long-term current use of insulin 07/06/2016    Secondary hyperparathyroidism, renal 03/22/2016    Major depressive disorder, recurrent episode, moderate 03/10/2016    Insomnia 03/10/2016    Mixed migraine and muscle contraction headache 03/10/2016    Neurogenic bladder 12/14/2015    Metabolic acidosis 12/07/2015    Urinary retention 11/10/2015    Encounter for care or replacement of suprapubic tube 10/06/2015    Primary osteoarthritis involving multiple joints 09/15/2015    Iron deficiency anemia 03/16/2015    Enlarged lymph node 03/12/2015    Anemia 03/12/2015    Recurrent UTI 02/24/2015    Osteoarthritis of lumbar spine 02/20/2015    CKD (chronic kidney disease), stage IV 12/29/2014    Abnormality of gait and mobility 10/29/2014    Mobility impaired 10/29/2014    Falls frequently 09/30/2014    Dizziness and giddiness 09/22/2014    Nuclear sclerosis - Both Eyes 03/24/2014    VIJAYA (obstructive sleep apnea) 03/17/2014    Vitamin D deficiency disease 03/11/2014    Hyperlipidemia 03/11/2014    Intractable chronic migraine without aura 08/27/2013    Chronic rhinitis 03/23/2013    Fibromyalgia 12/17/2012     Hypertension 12/12/2012    Hypothyroidism 12/12/2012       Post-Op Diagnosis: same    Procedure(s) Performed:   1.  Cystoscopy with bladder botox injection    Specimen(s): none    Staff Surgeon:  Ronel Whittington MD    Assistant Surgeon: Anders Crane MD, Judy Weinstein MD    Anesthesia: Monitored Local Anesthesia with Sedation    Indications: Cecile Bowen is a 67 y.o. female with neurogenic bladder, bilateral Gr III-IV reflux, hx bilateral hydroneprhosis, managed with SPT and botox injection. Last injection 12/14/15    Findings:   Normal cystoscopy, small area of edema left lateral dome next to SPT balloon  300u in 30cc injected into bladder detrusor.     Estimated Blood Loss: min    Drains: none    Procedure in Detail:  After informed consent was obtained the patient was brought to the cystoscopy suite and placed in the supine position.  SCDs were applied and working.  Anesthesia was administered.  When the patient was adequately sedated she was placed in the dorsal lithotomy position and prepped and draped in the usual sterile fashion.      A rigid cystoscope in a 22 Fr sheath was introduced into the patients's bladder via the urethra.  This passed easily.  Formal cystoscopy was performed which revealed the ureteral orifices in their normal anatomic position bilaterally.  No bladder masses, trabeculations, stones or diverticuli were seen. A small area of edema was noted in the left lateral dome of the bladder near the spt balloon.      300U units of botox in 30cc was injected into the detrusor muscle throughout the bladder.  Good wheals were raised.  The patient's bladder was drained and the cystoscope was removed.     The patient tolerated the procedure well and was transferred to the recovery room in stable condition.      Disposition:  The patient will follow up with Dr. Whittington in 6 months for cystoscopy.  She was given prescriptions for tramadol.    Anders Crane MD

## 2018-10-08 NOTE — INTERVAL H&P NOTE
The patient has been examined and the H&P has been reviewed:    I concur with the findings and no changes have occurred since H&P was written.    Anesthesia/Surgery risks, benefits and alternative options discussed and understood by patient/family.    To OR today for cysto with botox injection 300 units      Active Hospital Problems    Diagnosis  POA    Neurogenic bladder [N31.9]  Yes      Resolved Hospital Problems   No resolved problems to display.

## 2018-10-08 NOTE — PROGRESS NOTES
Dr. Pinzon notified of elevated blood pressure most likely due to low back pain. Pt monitors bp at home and take clonidine. MD said he would come see patient.

## 2018-10-08 NOTE — DISCHARGE SUMMARY
[facility]  Discharge Note  Short Stay    Admit Date: 10/8/2018    Discharge Date and Time: 10/08/2018 12:40 PM      Attending Physician: Ronel Whittington MD     Discharge Provider: Anders Crane    Diagnoses:  Active Hospital Problems    Diagnosis  POA    Neurogenic bladder [N31.9]  Yes      Resolved Hospital Problems   No resolved problems to display.       Discharged Condition: good    Hospital Course: Patient was admitted for bladder botox injections and tolerated the procedure well with no complications. The patient was discharged home in good condition on the same day.       Final Diagnoses: Same as principal problem.    Disposition: Home or Self Care    Follow up/Patient Instructions:    Medications:  Reconciled Home Medications:   Current Discharge Medication List      START taking these medications    Details   traMADol (ULTRAM) 50 mg tablet Take 1 tablet (50 mg total) by mouth every 6 (six) hours as needed for Pain.  Qty: 5 tablet, Refills: 0         CONTINUE these medications which have NOT CHANGED    Details   aspirin-acetaminophen-caffeine 250-250-65 mg (EXCEDRIN MIGRAINE) 250-250-65 mg per tablet Take 1 tablet by mouth every 6 (six) hours as needed for Pain.      ciprofloxacin HCl (CIPRO) 500 MG tablet Take 1 tablet (500 mg total) by mouth 2 (two) times daily. for 7 days  Qty: 14 tablet, Refills: 0    Associated Diagnoses: Bacteria in urine; Bladder spasms; Neurogenic bladder; Chronic suprapubic catheter; Encounter for care or replacement of suprapubic tube      cyanocobalamin, vitamin B-12, (VITAMIN B-12) 5,000 mcg Subl Place 1 tablet under the tongue once daily.      ergocalciferol (VITAMIN D2) 50,000 unit Cap TAKE 1 CAPSULE (50,000 UNITS TOTAL) BY MOUTH EVERY 7 DAYS.  Qty: 12 capsule, Refills: 0      ferrous sulfate 325 (65 FE) MG EC tablet Take 325 mg by mouth once daily.       gemfibrozil (LOPID) 600 MG tablet Patient takes one tablet every other day  Qty: 60 tablet, Refills: 3       HYDROcodone-acetaminophen (NORCO)  mg per tablet Take 1 tablet by mouth every 6 (six) hours as needed.  Qty: 120 tablet, Refills: 0      insulin lispro (HUMALOG) 100 unit/mL injection Inject under the skin 7 units in the AM, 10 units for lunch, 12 units for Dinner , before meals  Qty: 10 mL, Refills: 11      LANTUS SOLOSTAR 100 unit/mL (3 mL) InPn pen INJECT 19 UNITS INTO THE SKIN EVERY EVENING.  Qty: 15 Syringe, Refills: 3      levothyroxine (SYNTHROID) 50 MCG tablet Take 1 tablet (50 mcg total) by mouth once daily.  Qty: 30 tablet, Refills: 11      magnesium oxide (MAG-OX) 400 mg tablet TAKE 1 TABLET (400 MG TOTAL) BY MOUTH 2 (TWO) TIMES DAILY.  Qty: 180 tablet, Refills: 3      methocarbamol (ROBAXIN) 750 MG Tab TAKE 1-2 TABLETS BY MOUTH 4 TIMES DAILY AS NEEDED  Qty: 180 tablet, Refills: 2      multivitamin with minerals tablet Take 1 tablet by mouth once daily.      pravastatin (PRAVACHOL) 40 MG tablet TAKE 1 TABLET BY MOUTH EVERY DAY  Qty: 30 tablet, Refills: 5      riboflavin, vitamin B2, 400 mg Tab Take 400 mg by mouth once daily.  Qty: 90 tablet, Refills: 3    Associated Diagnoses: Intractable chronic migraine without aura and with status migrainosus      sodium bicarbonate 650 MG tablet TAKE 1 TABLET (650 MG TOTAL) BY MOUTH 3 (THREE) TIMES DAILY.  Qty: 270 tablet, Refills: 3      sumatriptan (IMITREX) 100 MG tablet Take 1 tablet (100 mg total) by mouth 2 (two) times daily as needed for Migraine.  Qty: 9 tablet, Refills: 11    Associated Diagnoses: Intractable chronic migraine without aura and with status migrainosus      topiramate (TOPAMAX) 200 MG Tab TAKE 1 TABLET BY MOUTH TWICE A DAY  Qty: 180 tablet, Refills: 2    Associated Diagnoses: Intractable chronic migraine without aura and with status migrainosus      traZODone (DESYREL) 100 MG tablet TAKE 1 TABLET BY MOUTH AT BEDTIME MAY TAKE AN EXTRA HALF TABLET IF NEEDED  Qty: 45 tablet, Refills: 1      venlafaxine (EFFEXOR-XR) 75 MG 24 hr capsule TAKE  "2 CAPSULES BY MOUTH ONCE DAILY.  Qty: 180 capsule, Refills: 1      BD INSULIN PEN NEEDLE UF SHORT 31 gauge x 5/16" Ndle USE ONCE DAILY WITH LANTUS SOLOSTAR PEN INSULIN  Qty: 150 each, Refills: 3    Associated Diagnoses: Type 2 diabetes mellitus not at goal      BD INSULIN SYRINGE ULTRA-FINE 1/2 mL 31 gauge x 15/64" Syrg USE WITH INSULIN 4 TIMES A DAY  Qty: 100 Syringe, Refills: 12      blood sugar diagnostic Strp ONE TOUCH ULTRA TEST STRIPS//Check blood sugars QID  Qty: 200 strip, Refills: 6      blood-glucose meter kit ONE TOUCH ULTRA  Qty: 1 each, Refills: 0      cloNIDine (CATAPRES) 0.1 MG tablet Take 1 tablet (0.1 mg total) by mouth once.  Qty: 30 tablet, Refills: 3      lancets (ONE TOUCH DELICA LANCETS) Misc Ultra 2 lancets to check blood sugar BID  Qty: 100 each, Refills: 6      oxybutynin (DITROPAN-XL) 10 MG 24 hr tablet TAKE 1 TABLET (10 MG TOTAL) BY MOUTH ONCE DAILY.  Qty: 30 tablet, Refills: 9    Associated Diagnoses: Urinary urgency      scopolamine (TRANSDERM-SCOP) 1.3-1.5 mg (1 mg over 3 days) Place 1 patch onto the skin every 72 hours.  Qty: 4 patch, Refills: 0           Discharge Procedure Orders   Diet general     Call MD for:  temperature >100.4     Call MD for:  persistent nausea and vomiting     Call MD for:  severe uncontrolled pain     Call MD for:  difficulty breathing, headache or visual disturbances     Call MD for:  hives     Call MD for:  extreme fatigue     Follow-up Information     Ronel Whittington MD In 6 months.    Specialty:  Urology  Why:  cystoscopy  Contact information:  [address] [facility] [address]  [phone]                     "

## 2018-10-08 NOTE — DISCHARGE INSTRUCTIONS
PATIENT INSTRUCTIONS  POST-ANESTHESIA    IMMEDIATELY FOLLOWING SURGERY:  Do not drive or operate machinery for the first twenty four hours after surgery.  Do not make any important decisions for twenty four hours after surgery or while taking narcotic pain medications or sedatives.  If you develop intractable nausea and vomiting or a severe headache please notify your doctor immediately.    FOLLOW-UP:  Please make an appointment with your surgeon as instructed. You do not need to follow up with anesthesia unless specifically instructed to do so.        Cystoscopy    Cystoscopy is a procedure that lets your doctor look directly inside your urethra and bladder. It can be used to:  · Help diagnose a problem with your urethra, bladder, or kidneys.  · Take a sample (biopsy) of bladder or urethral tissue.  · Treat certain problems (such as removing kidney stones).  · Place a stent to bypass an obstruction.  · Take special X-rays of the kidneys.  Based on the findings, your doctor may recommend other tests or treatments.  What is a cystoscope?  A cystoscope is a telescope-like instrument that contains lenses and fiberoptics (small glass wires that make bright light). The cystoscope may be straight and rigid, or flexible to bend around curves in the urethra. The doctor may look directly into the cystoscope, or project the image onto a monitor.  Getting ready  · Ask your doctor if you should stop taking any medicines before the procedure.  · Ask whether you should avoid eating or drinking anything after midnight before the procedure.  · Follow any other instructions your doctor gives you.  Tell your doctor before the exam if you:  · Take any medicines, such as aspirin or blood thinners  · Have allergies to any medicines  · Are pregnant   The procedure  Cystoscopy is done in the doctors office, surgery center, or hospital. The doctor and a nurse are present during the procedure. It takes only a few minutes, longer if a  biopsy, X-ray, or treatment needs to be done.  During the procedure:  · You lie on an exam table on your back, knees bent and legs apart. You are covered with a drape.  · Your urethra and the area around it are washed. Anesthetic jelly may be applied to numb the urethra. Other pain medicine is usually not needed. In some cases, you may be offered a mild sedative to help you relax. If a more extensive procedure is to be done, such as a biopsy or kidney stone removal, general anesthesia may be needed.  · The cystoscope is inserted. A sterile fluid is put into the bladder to expand it. You may feel pressure from this fluid.  · When the procedure is done, the cystoscope is removed.  After the procedure  If you had a sedative, general anesthesia, or spinal anesthesia, you must have someone drive you home. Once youre home:  · Drink plenty of fluids.  · You may have burning or light bleeding when you urinate--this is normal.  · Medicines may be prescribed to ease any discomfort or prevent infection. Take these as directed.  · Call your doctor if you have heavy bleeding or blood clots, burning that lasts more than a day, a fever over 100°F  (38° C), or trouble urinating.  Date Last Reviewed: 1/1/2017 © 2000-2017 [employer] [address] [place] [address], [place] [address]. All rights reserved. This information is not intended as a substitute for professional medical care. Always follow your healthcare professional's instructions.

## 2018-10-08 NOTE — ANESTHESIA POSTPROCEDURE EVALUATION
"Anesthesia Post Evaluation    Patient: Cecile Bowen    Procedure(s) Performed: Procedure(s) (LRB):  CYSTOSCOPY (N/A)  INJECTION, BOTULINUM TOXIN, TYPE A 300 UNITS (N/A)    Final Anesthesia Type: general (Natural airway)  Patient location during evaluation: PACU  Patient participation: Yes- Able to Participate  Level of consciousness: awake and alert  Post-procedure vital signs: reviewed and stable  Pain management: adequate  Airway patency: patent  PONV status at discharge: No PONV  Anesthetic complications: no      Cardiovascular status: hemodynamically stable  Respiratory status: unassisted  Hydration status: euvolemic  Follow-up not needed.        Visit Vitals  BP (!) 134/59   Pulse 71   Temp 36.9 °C (98.4 °F) (Temporal)   Resp (!) 24   Ht 5' 8" (1.727 m)   Wt 130.6 kg (288 lb)   SpO2 98%   Breastfeeding? No   BMI 43.79 kg/m²       Pain/Audrey Score: Pain Assessment Performed: Yes (10/8/2018 12:42 PM)  Presence of Pain: denies (10/8/2018 12:42 PM)  Audrey Score: 9 (10/8/2018 12:42 PM)        "

## 2018-10-19 ENCOUNTER — LAB VISIT (OUTPATIENT)
Dept: LAB | Facility: HOSPITAL | Age: 68
End: 2018-10-19
Attending: INTERNAL MEDICINE
Payer: MEDICARE

## 2018-10-19 DIAGNOSIS — E11.9 TYPE 2 DIABETES MELLITUS WITHOUT COMPLICATION: ICD-10-CM

## 2018-10-19 DIAGNOSIS — N18.4 CKD (CHRONIC KIDNEY DISEASE) STAGE 4, GFR 15-29 ML/MIN: ICD-10-CM

## 2018-10-19 LAB
ALBUMIN SERPL BCP-MCNC: 3.4 G/DL
ANION GAP SERPL CALC-SCNC: 9 MMOL/L
BUN SERPL-MCNC: 19 MG/DL
CALCIUM SERPL-MCNC: 9.2 MG/DL
CHLORIDE SERPL-SCNC: 99 MMOL/L
CO2 SERPL-SCNC: 22 MMOL/L
CREAT SERPL-MCNC: 2.2 MG/DL
EST. GFR  (AFRICAN AMERICAN): 26.2 ML/MIN/1.73 M^2
EST. GFR  (NON AFRICAN AMERICAN): 22.7 ML/MIN/1.73 M^2
ESTIMATED AVG GLUCOSE: 301 MG/DL
GLUCOSE SERPL-MCNC: 343 MG/DL
HBA1C MFR BLD HPLC: 12.1 %
PHOSPHATE SERPL-MCNC: 3 MG/DL
POTASSIUM SERPL-SCNC: 4.2 MMOL/L
PTH-INTACT SERPL-MCNC: 126 PG/ML
SODIUM SERPL-SCNC: 130 MMOL/L

## 2018-10-19 PROCEDURE — 83970 ASSAY OF PARATHORMONE: CPT

## 2018-10-19 PROCEDURE — 36415 COLL VENOUS BLD VENIPUNCTURE: CPT

## 2018-10-19 PROCEDURE — 80069 RENAL FUNCTION PANEL: CPT

## 2018-10-19 PROCEDURE — 83036 HEMOGLOBIN GLYCOSYLATED A1C: CPT

## 2018-10-22 ENCOUNTER — TELEPHONE (OUTPATIENT)
Dept: INTERNAL MEDICINE | Facility: CLINIC | Age: 68
End: 2018-10-22

## 2018-10-22 NOTE — TELEPHONE ENCOUNTER
----- Message from Lurdes Navarro MD sent at 10/19/2018  5:09 PM CDT -----  Please note that your diabetes has elevated significantly from a Hemoglobin average of 169 to 301  Please keep your appointment with the Diabetes Department next week so a new plan can be instituted and   the blood sugar can be better managed.  Lurdes Cancino

## 2018-10-23 NOTE — PROGRESS NOTES
CC: This 68 y.o. female presents for management of diabetes mellitus     HPI: She was diagnosed with T2DM in , found on routine bloodwork screening given family hx of DM. Previously tried Actos but not effective.   Last seen by CAROLE Dwyer DNP in  and is now being seen by me for the first time.  Lab Results   Component Value Date    HGBA1C 12.1 (H) 10/19/2018     a1c is elevated. a1c went from 7.5% to 12.1% over the past year.    Lives w/ brother and sister.   Brother-scoliosis  Sister-herniated disc  Hard to cook meals, ADLs at times     DIET/ MEAL PATTERN: Eat 3 meals a day,   Breakfast- yogurts, fruit  Lunch-  Meals on wheels-hot meals, smoothie from [employer]   Dinner- Ready to Eat from [employer]  Bedtime snacks-limits, if so popcorn    EXERCISE: no formal exercise, limited r/t back pain  7/10    CURRENT DM MEDS: Lantus Solostar 20-21 units daily, Humalog vials 7-10-12 + correction  Checks Sugar 4 times a day.   FB-330  Pre lunch: same   Pre dinner: last few days-has gotten lower-but remains in 200s    Lowest 50s here and there  Range 50s-330    Switching insurances in July and she is switching to [facility]    STANDARDS OF CARE:  Eye exam:  2018. No history of retinopathy. Sees Dr Kearns   Foot exam: 2017   BMD 12/16- + osteopenia                    ROS:   Gen: good appetite, generalized fatigue and weakness.   Skin: Skin is intact, no rashes .  Eyes: + visual disturbances, +cataracts in both eyes  Resp:   no SOB + WOLFE, no cough  Cardiac: No palpitations, chest pain, denies syncope, weakness, no edema or cyanosis.  GI: No nausea or vomiting, no constipation   /GYN: has suprapubic catheter placed.   PVD: c/o chronic back pain rates 7/10 .  MS/Neuro: Denies numbness/ tingling in BLE; migraines every day-imitrex (generic-takes 1/2 tab #9 a month, excedrin migraine). In scooter  Psych: Denies drug/ETOH abuse, no hx. of eating disorders   Other systems:  negative.    PE:  GENERAL: Well developed, well nourished.  PSYCH: AAOx3, appropriate mood and affect, pleasant expression, conversant, appears relaxed, well groomed.   EYES: Conjunctiva, corneas clear, no lid lag, EOM intact.  NECK: Supple, trachea midline  VASCULAR:  1+ (L) worse than (R)edema BLE, brisk capillary refill BUE.  SKIN:   Skin warm and dry.  no acanthosis nigracans. +venous stasis-worse on (L)-marking   Feet- footwear appropriate -diabetic shoes and socks    Visual Inspection:  Dry Skin -  Bilateral    Pedal Pulses:   Right: Diminshed  Left: Diminshed    Posterior tibialis:   Right:Diminshed  Left: Diminshed    Pulses +1 bilateral       Hemoglobin A1C   Date Value Ref Range Status   10/19/2018 12.1 (H) 4.0 - 5.6 % Final     Comment:     ADA Screening Guidelines:  5.7-6.4%  Consistent with prediabetes  >or=6.5%  Consistent with diabetes  High levels of fetal hemoglobin interfere with the HbA1C  assay. Heterozygous hemoglobin variants (HbS, HgC, etc)do  not significantly interfere with this assay.   However, presence of multiple variants may affect accuracy.     09/21/2017 7.5 (H) 4.0 - 5.6 % Final     Comment:     According to ADA guidelines, hemoglobin A1c <7.0% represents  optimal control in non-pregnant diabetic patients. Different  metrics may apply to specific patient populations.   Standards of Medical Care in Diabetes-2016.  For the purpose of screening for the presence of diabetes:  <5.7%     Consistent with the absence of diabetes  5.7-6.4%  Consistent with increasing risk for diabetes   (prediabetes)  >or=6.5%  Consistent with diabetes  Currently, no consensus exists for use of hemoglobin A1c  for diagnosis of diabetes for children.  This Hemoglobin A1c assay has significant interference with fetal   hemoglobin   (HbF). The results are invalid for patients with abnormal amounts of   HbF,   including those with known Hereditary Persistence   of Fetal Hemoglobin. Heterozygous hemoglobin  variants (HbAS, HbAC,   HbAD, HbAE, HbA2) do not significantly interfere with this assay;   however, presence of multiple variants in a sample may impact the %   interference.     03/30/2017 7.0 (H) 4.5 - 6.2 % Final     Comment:     According to ADA guidelines, hemoglobin A1C <7.0% represents  optimal control in non-pregnant diabetic patients.  Different  metrics may apply to specific populations.   Standards of Medical Care in Diabetes - 2016.  For the purpose of screening for the presence of diabetes:  <5.7%     Consistent with the absence of diabetes  5.7-6.4%  Consistent with increasing risk for diabetes   (prediabetes)  >or=6.5%  Consistent with diabetes  Currently no consensus exists for use of hemoglobin A1C  for diagnosis of diabetes for children.       Lab Results   Component Value Date    TSH <0.010 (L) 09/21/2017       ASSESSMENT and PLAN:  1. Uncontrolled type 2 diabetes mellitus with chronic kidney disease, with long-term current use of insulin  F/u in 3 mos  A1c, tsh next time  rx needed for lancets, pen needles  Increase humalog 12 units w/ meals plus scale 180-230+2, etc  Increase lantus to 32 units daily -50% increase  Will look into dpp4i or glp1a low dose next time  DE in the next few weeks-nutrition/log review, etc  Contact info given  Pt is apprehensive of increase insulin to drastically  Will gradually increase-next increase after DE visit.   a1c goal less than 7.5%    Counseling >35 mins      2. CKD (chronic kidney disease), stage IV  F/u with nephrology q3 mos     following w Dr López in [place]     3. Essential hypertension  Controlled, continue med(s)    4. Hyperlipidemia, unspecified hyperlipidemia type  Lab Results   Component Value Date    LDLCALC 76.8 09/21/2017     At goal   Repeat lipid next time    5. Morbid obesity due to excess calories  Body mass index is 43.54 kg/m². may increase insulin resistance.  Encourage chair exercise at least 3 times a week for 15-30 mins per  session    6. Acquired hypothyroidism  respeat tsh  Lab Results   Component Value Date    TSH <0.010 (L) 09/21/2017         7. Recurrent UTI  Has suprapubic catheter   8. VIJAYA (obstructive sleep apnea)  Not using cpap  If uncontrolled, may increase insulin resistance.    9. Nuclear sclerosis of both eyes  F/u with ophthalmology    10. Major depressive disorder, recurrent episode, moderate  On effexor   F/u with pcp  If uncontrolled, may affect adls, blood glucose management    11. Chronic bilateral low back pain without sciatica  May increase insulin resistance.   12. Type 2 diabetes mellitus not at goal  See above changes.

## 2018-10-24 ENCOUNTER — OFFICE VISIT (OUTPATIENT)
Dept: ENDOCRINOLOGY | Facility: CLINIC | Age: 68
End: 2018-10-24
Payer: MEDICARE

## 2018-10-24 VITALS
SYSTOLIC BLOOD PRESSURE: 124 MMHG | DIASTOLIC BLOOD PRESSURE: 78 MMHG | WEIGHT: 286.38 LBS | BODY MASS INDEX: 43.4 KG/M2 | HEART RATE: 73 BPM | HEIGHT: 68 IN

## 2018-10-24 DIAGNOSIS — I10 ESSENTIAL HYPERTENSION: ICD-10-CM

## 2018-10-24 DIAGNOSIS — E03.9 ACQUIRED HYPOTHYROIDISM: ICD-10-CM

## 2018-10-24 DIAGNOSIS — F33.1 MAJOR DEPRESSIVE DISORDER, RECURRENT EPISODE, MODERATE: ICD-10-CM

## 2018-10-24 DIAGNOSIS — G89.29 CHRONIC BILATERAL LOW BACK PAIN WITHOUT SCIATICA: ICD-10-CM

## 2018-10-24 DIAGNOSIS — N18.4 CKD (CHRONIC KIDNEY DISEASE), STAGE IV: ICD-10-CM

## 2018-10-24 DIAGNOSIS — M54.50 CHRONIC BILATERAL LOW BACK PAIN WITHOUT SCIATICA: ICD-10-CM

## 2018-10-24 DIAGNOSIS — G47.33 OSA (OBSTRUCTIVE SLEEP APNEA): ICD-10-CM

## 2018-10-24 DIAGNOSIS — H25.13 NUCLEAR SCLEROSIS OF BOTH EYES: ICD-10-CM

## 2018-10-24 DIAGNOSIS — E78.5 HYPERLIPIDEMIA, UNSPECIFIED HYPERLIPIDEMIA TYPE: ICD-10-CM

## 2018-10-24 DIAGNOSIS — N39.0 RECURRENT UTI: ICD-10-CM

## 2018-10-24 DIAGNOSIS — E11.9 TYPE 2 DIABETES MELLITUS NOT AT GOAL: ICD-10-CM

## 2018-10-24 DIAGNOSIS — E66.01 MORBID OBESITY DUE TO EXCESS CALORIES: ICD-10-CM

## 2018-10-24 PROCEDURE — 99215 OFFICE O/P EST HI 40 MIN: CPT | Mod: PBBFAC | Performed by: NURSE PRACTITIONER

## 2018-10-24 PROCEDURE — 99215 OFFICE O/P EST HI 40 MIN: CPT | Mod: S$PBB,,, | Performed by: NURSE PRACTITIONER

## 2018-10-24 PROCEDURE — 99999 PR PBB SHADOW E&M-EST. PATIENT-LVL V: CPT | Mod: PBBFAC,,, | Performed by: NURSE PRACTITIONER

## 2018-10-24 RX ORDER — INSULIN GLARGINE 100 [IU]/ML
INJECTION, SOLUTION SUBCUTANEOUS
Qty: 15 ML | Refills: 6 | Status: SHIPPED | OUTPATIENT
Start: 2018-10-24 | End: 2019-03-14 | Stop reason: SDUPTHER

## 2018-10-24 RX ORDER — INSULIN LISPRO 100 [IU]/ML
INJECTION, SOLUTION INTRAVENOUS; SUBCUTANEOUS
Qty: 20 ML | Refills: 6 | Status: SHIPPED | OUTPATIENT
Start: 2018-10-24 | End: 2019-03-14

## 2018-10-24 RX ORDER — LANCETS
EACH MISCELLANEOUS
Qty: 100 EACH | Refills: 6 | Status: SHIPPED | OUTPATIENT
Start: 2018-10-24 | End: 2020-08-20 | Stop reason: SDUPTHER

## 2018-10-24 RX ORDER — PEN NEEDLE, DIABETIC 30 GX3/16"
NEEDLE, DISPOSABLE MISCELLANEOUS
Qty: 100 EACH | Refills: 3 | Status: SHIPPED | OUTPATIENT
Start: 2018-10-24 | End: 2019-12-11 | Stop reason: SDUPTHER

## 2018-10-24 NOTE — PATIENT INSTRUCTIONS
Snacks can be an important part of a balanced, healthy meal plan. They allow you to eat more frequently, feeling full and satisfied throughout the day. Also, they allow you to spread carbohydrates evenly, which may stabilize blood sugars.  Plus, snacks are enjoyable!     The amount of carbohydrate needed at snacks varies. Generally, about 15-30 grams of carbohydrate per snack is recommended.  Below you will find some tasty treats.       0-5 gm carb   Crystal Light   Vitamin Water Zero   Herbal tea, unsweetened   2 tsp peanut butter on celery   1./2 cup sugar-free jell-o   1 sugar-free popsicle   ¼ cup blueberries   8oz Blue Anna unsweetened almond milk   5 baby carrots & celery sticks, cucumbers, bell peppers dipped in ¼ cup salsa, 2Tbsp light ranch dressing or 2Tbsp plain Greek yogurt   10 Goldfish crackers   ½ oz low-fat cheese or string cheese   1 closed handful of nuts, unsalted   1 Tbsp of sunflower seeds, unsalted   1 cup Smart Pop popcorn   1 whole grain brown rice cake        15 gm carb   1 small piece of fruit or ½ banana or 1/2 cup lite canned fruit   3 susana cracker squares   3 cups Smart Pop popcorn, top spray butter, Contreras lite salt or cinnamon and Truvia   5 Vanilla Wafers   ½ cup low fat, no added sugar ice cream or frozen yogurt (Blue bell, Blue Bunny, Weight Watchers, Skinny Cow)   ½ turkey, ham, or chicken sandwich   ½ c fruit with ½ c Cottage cheese   4-6 unsalted wheat crackers with 1 oz low fat cheese or 1 tbsp peanut butter    30-45 goldfish crackers (depending on flavor)    7-8 Rastafari mini brown rice cakes (caramel, apple cinnamon, chocolate)    12 Rastafari mini brown rice cakes (cheddar, bbq, ranch)    1/3 cup hummus dip with raw veg   1/2 whole wheat kena, 1Tbsp hummus   Mini Pizza (1/2 whole wheat English muffin, low-fat  cheese, tomato sauce)   100 calorie snack pack (Oreo, Chips Ahoy, Ritz Mix, Baked Cheetos)   4-6 oz. light or Greek Style yogurt  (Brynn Coles, Liza, [facility])   ½ cup sugar-free pudding     6 in. wheat tortilla or kena oven toasted chips (topped with spray butter flavoring, cinnamon, Truvia OR spray butter, garlic powder, chili powder)    18 BBQ Popchips (available at [employer], [employer] Foods, Fresh Market)                   Diabetes Support Group Meetings         Date: Topic:   February 8 Health Promotion/Cooking Demo   March 8 Taking Care of Your Kidneys   April 12 Taking Care of Your Feet   May 10 Ease Your Mind with Diabetes   Zaynab 14 Summer Treats/Cooking Demo   July 12 Super Market Sweep   August 9 Taking Care of Your Eyes   Sept 13 Technology/ADA updates   October 11 Recipes & Treats/Cooking Demo   November 8 Heart Health/Pump it up!   December 13 Year-End Close Out        Meetings are held in the [facility] ([facility]) of the [facility] located at [address] [place] [address]. Please call [phone] for additional information.    Free service, offered every 2nd Thursday of every month! Family members and/or friends are welcome as well!  Support group is for patients with type 1 or type 2 diabetes.    From 3:30p to 4:30p

## 2018-10-26 ENCOUNTER — PATIENT MESSAGE (OUTPATIENT)
Dept: NEPHROLOGY | Facility: CLINIC | Age: 68
End: 2018-10-26

## 2018-10-29 DIAGNOSIS — G89.29 CHRONIC BILATERAL LOW BACK PAIN WITHOUT SCIATICA: Primary | ICD-10-CM

## 2018-10-29 DIAGNOSIS — M54.50 CHRONIC BILATERAL LOW BACK PAIN WITHOUT SCIATICA: Primary | ICD-10-CM

## 2018-10-29 RX ORDER — METHOCARBAMOL 750 MG/1
750 TABLET, FILM COATED ORAL 3 TIMES DAILY PRN
Qty: 180 TABLET | Refills: 0 | Status: SHIPPED | OUTPATIENT
Start: 2018-10-29 | End: 2018-10-30 | Stop reason: SDUPTHER

## 2018-10-30 RX ORDER — METHOCARBAMOL 750 MG/1
750 TABLET, FILM COATED ORAL 3 TIMES DAILY PRN
Qty: 180 TABLET | Refills: 0 | Status: SHIPPED | OUTPATIENT
Start: 2018-10-30 | End: 2018-11-12 | Stop reason: SDUPTHER

## 2018-10-31 ENCOUNTER — OFFICE VISIT (OUTPATIENT)
Dept: NEPHROLOGY | Facility: CLINIC | Age: 68
End: 2018-10-31
Payer: MEDICARE

## 2018-10-31 ENCOUNTER — IMMUNIZATION (OUTPATIENT)
Dept: FAMILY MEDICINE | Facility: CLINIC | Age: 68
End: 2018-10-31
Payer: MEDICARE

## 2018-10-31 VITALS
HEIGHT: 68 IN | DIASTOLIC BLOOD PRESSURE: 82 MMHG | BODY MASS INDEX: 43.44 KG/M2 | SYSTOLIC BLOOD PRESSURE: 136 MMHG | WEIGHT: 286.63 LBS

## 2018-10-31 DIAGNOSIS — Z74.09 MOBILITY IMPAIRED: ICD-10-CM

## 2018-10-31 DIAGNOSIS — M79.7 FIBROMYALGIA: ICD-10-CM

## 2018-10-31 DIAGNOSIS — G44.209 MIXED MIGRAINE AND MUSCLE CONTRACTION HEADACHE: ICD-10-CM

## 2018-10-31 DIAGNOSIS — N18.4 CKD (CHRONIC KIDNEY DISEASE) STAGE 4, GFR 15-29 ML/MIN: Primary | ICD-10-CM

## 2018-10-31 DIAGNOSIS — I10 ESSENTIAL HYPERTENSION: ICD-10-CM

## 2018-10-31 DIAGNOSIS — E78.2 MIXED HYPERLIPIDEMIA: ICD-10-CM

## 2018-10-31 DIAGNOSIS — G43.909 MIXED MIGRAINE AND MUSCLE CONTRACTION HEADACHE: ICD-10-CM

## 2018-10-31 DIAGNOSIS — E87.20 METABOLIC ACIDOSIS: ICD-10-CM

## 2018-10-31 DIAGNOSIS — N18.4 CKD (CHRONIC KIDNEY DISEASE), STAGE IV: ICD-10-CM

## 2018-10-31 DIAGNOSIS — E03.9 ACQUIRED HYPOTHYROIDISM: ICD-10-CM

## 2018-10-31 DIAGNOSIS — D50.8 OTHER IRON DEFICIENCY ANEMIA: ICD-10-CM

## 2018-10-31 DIAGNOSIS — Z93.59 CHRONIC SUPRAPUBIC CATHETER: ICD-10-CM

## 2018-10-31 DIAGNOSIS — E66.01 MORBID OBESITY DUE TO EXCESS CALORIES: ICD-10-CM

## 2018-10-31 DIAGNOSIS — G47.33 OSA (OBSTRUCTIVE SLEEP APNEA): ICD-10-CM

## 2018-10-31 DIAGNOSIS — G89.29 OTHER CHRONIC PAIN: ICD-10-CM

## 2018-10-31 DIAGNOSIS — N25.81 SECONDARY HYPERPARATHYROIDISM, RENAL: ICD-10-CM

## 2018-10-31 DIAGNOSIS — N31.9 NEUROGENIC BLADDER: ICD-10-CM

## 2018-10-31 DIAGNOSIS — E55.9 VITAMIN D DEFICIENCY DISEASE: ICD-10-CM

## 2018-10-31 DIAGNOSIS — R26.9 ABNORMALITY OF GAIT AND MOBILITY: ICD-10-CM

## 2018-10-31 PROCEDURE — 99214 OFFICE O/P EST MOD 30 MIN: CPT | Mod: S$PBB,,, | Performed by: INTERNAL MEDICINE

## 2018-10-31 PROCEDURE — 90662 IIV NO PRSV INCREASED AG IM: CPT | Mod: PBBFAC,PO

## 2018-10-31 PROCEDURE — 99999 PR PBB SHADOW E&M-EST. PATIENT-LVL IV: CPT | Mod: PBBFAC,,, | Performed by: INTERNAL MEDICINE

## 2018-10-31 PROCEDURE — 99214 OFFICE O/P EST MOD 30 MIN: CPT | Mod: PBBFAC,25,PO | Performed by: INTERNAL MEDICINE

## 2018-11-01 ENCOUNTER — PATIENT MESSAGE (OUTPATIENT)
Dept: NEPHROLOGY | Facility: CLINIC | Age: 68
End: 2018-11-01

## 2018-11-02 ENCOUNTER — TELEPHONE (OUTPATIENT)
Dept: RHEUMATOLOGY | Facility: CLINIC | Age: 68
End: 2018-11-02

## 2018-11-02 NOTE — TELEPHONE ENCOUNTER
----- Message from Ghada Forrest sent at 11/2/2018  8:02 AM CDT -----  Contact: Self  Patient requesting a call to discuss medication.  Patient says directions on medication are wrong. Should read 1-2 tablets a day, instead it reads 1 tablet a day.  Cell [phone]

## 2018-11-03 ENCOUNTER — PATIENT MESSAGE (OUTPATIENT)
Dept: NEPHROLOGY | Facility: CLINIC | Age: 68
End: 2018-11-03

## 2018-11-05 NOTE — PROGRESS NOTES
Subjective:       Patient ID: Cecile Bowen is a 68 y.o. White female who presents for follow-up evaluation of Follow-up and Chronic Kidney Disease    HPI     She reports she is feeling OK except her Hba1c jumped up to 12.1 from 7.5.  Her mobility remains poor and she continues to use a scooter. Her HAs are at bay. No cloudy urine. LE edema is about the same. No NSAID use, uses morphine derivatives for pain. 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   HENT: Negative for facial swelling.    Eyes: Negative for visual disturbance.   Respiratory: Negative for shortness of breath.    Cardiovascular: Positive for leg swelling. Negative for chest pain.   Gastrointestinal: Negative for constipation and diarrhea.   Genitourinary: Negative for difficulty urinating, dysuria and hematuria.   Musculoskeletal: Positive for arthralgias and back pain.   Neurological: Negative for weakness and headaches.   Psychiatric/Behavioral: Negative for sleep disturbance.       Objective:      Physical Exam   Constitutional: She is oriented to person, place, and time. She appears well-nourished.   HENT:   Mouth/Throat: Oropharynx is clear and moist.   Neck: No JVD present.   Cardiovascular: S1 normal and S2 normal. Exam reveals no friction rub.   Pulmonary/Chest: Breath sounds normal. She has no wheezes. She has no rales.   Abdominal: Soft.   Musculoskeletal: She exhibits edema.   Neurological: She is alert and oriented to person, place, and time.   Skin: Skin is warm and dry.   Psychiatric: She has a normal mood and affect.   Vitals reviewed.      Assessment:       1. CKD (chronic kidney disease) stage 4, GFR 15-29 ml/min    2. CKD (chronic kidney disease), stage IV    3. Metabolic acidosis    4. Neurogenic bladder    5. Chronic suprapubic catheter    6. Essential hypertension    7. Other iron deficiency anemia    8. Vitamin D  deficiency disease    9. Secondary hyperparathyroidism, renal    10. Acquired hypothyroidism    11. Uncontrolled type 2 diabetes mellitus with chronic kidney disease, with long-term current use of insulin    12. Morbid obesity due to excess calories    13. Fibromyalgia    14. VIJAYA (obstructive sleep apnea)    15. Abnormality of gait and mobility    16. Mobility impaired    17. Mixed hyperlipidemia    18. Mixed migraine and muscle contraction headache    19. Other chronic pain        Plan:           CKD Stage 4 with stable kidney function. Proteinuria increased--may be due to BP or ongoing morbid obesity and poorly controlled DM. If trend continues for next visit will add low dose RAAS blocker, Ace/ARB/aldactone as BP allows    Mineral and Bone Disease--PTH at goal. Continue calcitriol and D3. She is on exogenous bicarbonate for metabolic acidosis and bicarb is at goal    HTN is controlled but advised her to check at home more often (at least 2-3X week)    UTI hx given suprapubic catheter--continue Urology follow up    DM--Vast worsening in glycemic control. Keep Endocrine follow up and she has an appt with DM dietitian next week. Explained that she DOES NOT need to avoid high protein from a nephrology perspective, just smart protein choices (rare red meat, lean meats, eggs are fine, beans, nuts etc.)    Morbid Obesity--11lb weight loss since last visit but still consider bariatric surgery      RTC 4 months

## 2018-11-07 ENCOUNTER — CLINICAL SUPPORT (OUTPATIENT)
Dept: DIABETES | Facility: CLINIC | Age: 68
End: 2018-11-07
Payer: MEDICARE

## 2018-11-07 ENCOUNTER — OFFICE VISIT (OUTPATIENT)
Dept: UROLOGY | Facility: CLINIC | Age: 68
End: 2018-11-07
Payer: MEDICARE

## 2018-11-07 ENCOUNTER — PATIENT MESSAGE (OUTPATIENT)
Dept: DIABETES | Facility: CLINIC | Age: 68
End: 2018-11-07

## 2018-11-07 VITALS
DIASTOLIC BLOOD PRESSURE: 93 MMHG | BODY MASS INDEX: 43.19 KG/M2 | HEART RATE: 101 BPM | HEIGHT: 68 IN | SYSTOLIC BLOOD PRESSURE: 144 MMHG | WEIGHT: 285 LBS

## 2018-11-07 DIAGNOSIS — N31.9 NEUROGENIC BLADDER: Primary | ICD-10-CM

## 2018-11-07 DIAGNOSIS — Z43.5 ENCOUNTER FOR CARE OR REPLACEMENT OF SUPRAPUBIC TUBE: ICD-10-CM

## 2018-11-07 DIAGNOSIS — Z93.59 CHRONIC SUPRAPUBIC CATHETER: ICD-10-CM

## 2018-11-07 PROCEDURE — 99999 PR PBB SHADOW E&M-EST. PATIENT-LVL II: CPT | Mod: PBBFAC,,,

## 2018-11-07 PROCEDURE — 99999 PR PBB SHADOW E&M-EST. PATIENT-LVL V: CPT | Mod: PBBFAC,,, | Performed by: NURSE PRACTITIONER

## 2018-11-07 PROCEDURE — 51705 CHANGE OF BLADDER TUBE: CPT | Mod: PBBFAC | Performed by: NURSE PRACTITIONER

## 2018-11-07 PROCEDURE — 99212 OFFICE O/P EST SF 10 MIN: CPT | Mod: PBBFAC,27,25

## 2018-11-07 PROCEDURE — 51705 CHANGE OF BLADDER TUBE: CPT | Mod: S$PBB,,, | Performed by: NURSE PRACTITIONER

## 2018-11-07 PROCEDURE — 99215 OFFICE O/P EST HI 40 MIN: CPT | Mod: PBBFAC,25 | Performed by: NURSE PRACTITIONER

## 2018-11-07 PROCEDURE — G0108 DIAB MANAGE TRN  PER INDIV: HCPCS | Mod: PBBFAC | Performed by: DIETITIAN, REGISTERED

## 2018-11-07 PROCEDURE — 99214 OFFICE O/P EST MOD 30 MIN: CPT | Mod: S$PBB,25,, | Performed by: NURSE PRACTITIONER

## 2018-11-07 NOTE — PROGRESS NOTES
Subjective:       Patient ID: Cecile Bowen is a 68 y.o. female.    Chief Complaint: Neurogenic bladder    Cecile Bowen is a 68 y.o. Female with history of bilateral hydronephrosis, incomplete bladder emptying which is managed by SPT (16fr).  Her last SPT change was 08/23/2018.     09/17/2018 Procedure(s) Performed:   1.  Cystoscopy with bladder botox injection (300u)  2.  Suprapubic catheter change.     Here today for scheduled SPT change Bladder spasm are greatly reduced; pleased with botox.    No fever, n/v              Past Medical History:    Diabetes type 2, uncontrolled                   12/12/2012    Obesity                                         12/12/2012    Hypertension                                    12/12/2012    DJD (degenerative joint disease)                12/12/2012    Hypothyroidism                                  12/12/2012    Nuclear sclerosis - Both Eyes                   3/24/2014     Migraine                                                      Past Surgical History:    TOTAL ABDOMINAL HYSTERECTOMY W/ BILATERAL SALP*  1985          GALLBLADDER SURGERY                              2006          TONSILLECTOMY, ADENOIDECTOMY                                   OVARIAN CYST SURGERY                             1985          HYSTERECTOMY                                                   DILATION AND CURETTAGE OF UTERUS                               Review of patient's family history indicates:    Diabetes                       Sister                    Kidney disease                 Sister                    ALS                            Mother                      Comment: d.    Cancer                         Maternal Grandmother        Comment: d. colon    Cancer                         Paternal Grandfather        Comment: d. lung    Diabetes                       Maternal Aunt             Kidney disease                 Maternal Aunt             Diabetes                       Maternal  "Uncle            Amblyopia                      Neg Hx                    Blindness                      Neg Hx                    Cataracts                      Neg Hx                    Glaucoma                       Neg Hx                    Macular degeneration           Neg Hx                    Retinal detachment             Neg Hx                    Strabismus                     Neg Hx                      Social History    Marital Status:             Spouse Name:                       Years of Education:                 Number of children:               Occupational History    None on file    Social History Main Topics    Smoking Status: Never Smoker                      Smokeless Status: Never Used                        Alcohol Use: No              Drug Use: No              Sexual Activity: Not Currently           Birth Control/Protection: Abstinence    Other Topics            Concern    None on file    Social History Narrative    Single      Lives w/ sister  And brother     both are helping her during her post hosp recovery period        Allergies:  Review of patient's allergies indicates no known allergies.    Medications:  Current outpatient prescriptions:amitriptyline (ELAVIL) 10 MG tablet, TAKE 3 TABLETS BY MOUTH IN THE EVENING, Disp: 90 tablet, Rfl: 5;  aspirin (ECOTRIN) 81 MG EC tablet, Take 81 mg by mouth once daily., Disp: , Rfl: ;  BD INSULIN PEN NEEDLE UF SHORT 31 X 5/16 " Ndle, USE ONCE DAILY WITH LANTUS SOLOSTAR PEN INSULIN, Disp: 100 each, Rfl: 11  butalbital-acetaminophen-caffeine -40 mg (FIORICET, ESGIC) -40 mg per tablet, TAKE 1 TABLET EVERY 4 TO 6 HOURS, Disp: 30 tablet, Rfl: 0;  cyanocobalamin, vitamin B-12, (VITAMIN B-12) 2,500 mcg Subl, Place 2,500 mcg under the tongue once daily., Disp: , Rfl: ;  diphenhydrAMINE (BENADRYL) 25 mg capsule, Take 25 mg by mouth every 6 (six) hours as needed., Disp: , Rfl:   ferrous sulfate 325 (65 FE) MG EC tablet, Take 325 mg by " "mouth 3 (three) times daily with meals., Disp: , Rfl: ;  fluticasone (FLONASE) 50 mcg/actuation nasal spray, 1 SPRAY IN EACH NOSTRIL DAILY (Patient taking differently: 1 SPRAY IN EACH NOSTRIL DAILY PRN), Disp: 16 g, Rfl: 1;  furosemide (LASIX) 20 MG tablet, Take 1 tablet (20 mg total) by mouth once daily., Disp: 4 tablet, Rfl: 0  gemfibrozil (LOPID) 600 MG tablet, TAKE 1 TABLET BY MOUTH TWICE A DAY, Disp: 60 tablet, Rfl: 5;  (START ON 4/29/2015) hydrocodone-acetaminophen 10-325mg (NORCO)  mg Tab, Take 1 tablet by mouth every 6 (six) hours as needed., Disp: 120 tablet, Rfl: 0;  insulin lispro (HUMALOG) 100 unit/mL injection, Inject 7 Units into the skin 3 (three) times daily before meals., Disp: 6.3 mL, Rfl: 11  insulin syringe-needle U-100 (BD INSULIN SYRINGE ULTRA-FINE) 1/2 mL 31 x 15/64" Syrg, 1 Box by Misc.(Non-Drug; Combo Route) route 4 (four) times daily., Disp: 100 Syringe, Rfl: 12;  lancets (ONE TOUCH DELICA LANCETS) Misc, Ultra 2 lancets to check blood sugar BID, Disp: 100 each, Rfl: 6;  LANTUS SOLOSTAR 100 unit/mL (3 mL) InPn pen, , Disp: , Rfl: 3  LIDODERM 5 %(700 mg/patch), APPLY 1 PATCH TO SKIN DAILY (LEAVING ON FOR 12 HOURS AND OFF FOR 12 HOURS), Disp: 30 patch, Rfl: 6;  magnesium oxide (MAG-OX) 400 mg tablet, TAKE 1 TABLET (400 MG TOTAL) BY MOUTH 2 (TWO) TIMES DAILY., Disp: 60 tablet, Rfl: 11;  methocarbamol (ROBAXIN) 750 MG Tab, TAKE 1 TO 2 TABLETS BY MOUTH 3 TIMES A DAY (Patient taking differently: Take 2 tablets twice daily), Disp: 120 tablet, Rfl: 3  methocarbamol (ROBAXIN) 750 MG Tab, TAKE 1 TO 2 TABLETS BY MOUTH 3 TIMES A DAY, Disp: 120 tablet, Rfl: 3;  methylPREDNISolone (MEDROL DOSEPACK) 4 mg tablet, use as directed, Disp: 21 tablet, Rfl: 0;  multivitamin with minerals tablet, Take 1 tablet by mouth once daily., Disp: , Rfl: ;  pravastatin (PRAVACHOL) 40 MG tablet, TAKE 1 TABLET BY MOUTH EVERY DAY, Disp: 30 tablet, Rfl: 2  pyridoxine (B-6) 100 MG Tab, Take 1 tablet (100 mg total) by mouth " once daily., Disp: 30 tablet, Rfl: 12;  scopolamine (TRANSDERM-SCOP) 1.5 mg, Place 1 patch (1.5 mg total) onto the skin every 72 hours., Disp: 4 patch, Rfl: 0;  sulfamethoxazole-trimethoprim 800-160mg (BACTRIM DS) 800-160 mg Tab, Take 1 tablet by mouth 2 (two) times daily., Disp: , Rfl: 0  sumatriptan (IMITREX) 100 MG tablet, TAKE 1 TABLET (100 MG TOTAL) BY MOUTH ONCE., Disp: 9 tablet, Rfl: 6;  SYNTHROID 125 mcg tablet, TAKE 1 TABLET BY MOUTH DAILY, Disp: 90 tablet, Rfl: 4;  topiramate (TOPAMAX) 100 MG tablet, TAKE 1 TABLET (100 MG TOTAL) BY MOUTH 2 (TWO) TIMES DAILY., Disp: 60 tablet, Rfl: 11;  topiramate (TOPAMAX) 25 MG tablet, Take 4 tablets (100 mg total) by mouth 2 (two) times daily., Disp: 240 tablet, Rfl: 5  venlafaxine (EFFEXOR-XR) 150 MG Cp24, TAKE 1 CAPSULE BY MOUTH ONCE DAILY, Disp: 30 capsule, Rfl: 2;  vitamin D (VITAMIN D3) 185 MG Tab, Take 185 mg by mouth once daily., Disp: , Rfl:           Review of Systems   Constitutional: Negative for chills and fever.   HENT: Negative for facial swelling and trouble swallowing.    Eyes: Negative for visual disturbance.   Respiratory: Negative for cough, chest tightness, shortness of breath and wheezing.    Cardiovascular: Negative for chest pain and palpitations.   Gastrointestinal: Negative for abdominal pain, constipation, nausea and vomiting.   Genitourinary: Positive for difficulty urinating. Negative for dysuria, hematuria, urgency and vaginal bleeding.   Musculoskeletal: Positive for arthralgias and gait problem.   Skin: Negative for rash.   Neurological: Positive for weakness and headaches. Negative for dizziness.        Had migraine for 3 days; better today     Hematological: Does not bruise/bleed easily.   Psychiatric/Behavioral: Negative for behavioral problems.       Objective:      Physical Exam   Vitals reviewed.  Constitutional: She is oriented to person, place, and time. Vital signs are normal. She appears well-developed and well-nourished. She is  active and cooperative.   HENT:   Head: Normocephalic.   Right Ear: External ear normal.   Left Ear: External ear normal.   Nose: Nose normal.   Eyes: Conjunctivae and lids are normal. Right eye exhibits no discharge. Left eye exhibits no discharge. No scleral icterus.   Neck: Trachea normal and normal range of motion. Neck supple. No tracheal deviation present.   Cardiovascular: Normal rate, regular rhythm and intact distal pulses.    Pulmonary/Chest: Effort normal. No respiratory distress.   Abdominal: Soft. Bowel sounds are normal. She exhibits no distension and no mass. There is no tenderness. There is no rebound and no guarding.       Neurological: She is alert and oriented to person, place, and time.   Skin: Skin is warm, dry and intact.         Assessment:       1. Chronic suprapubic catheter    2. Encounter for care or replacement of suprapubic tube    3. Neurogenic bladder        Plan:          I spent 25 minutes with the patient of which more than half was spent in direct consultation with the patient in regards to our treatment and plan.    Education and recommendations of today's plan of care including home remedies.  I easily removed her old SPT then replaced with a new 16fr SPT using sterile technique.  Stoma friable.   Irrigated bladder to verify the position.  Balloon inflated with ~10cc sterile water.  Skin barrier cream and dsg applied.  Tolerated well.

## 2018-11-07 NOTE — PROGRESS NOTES
Diabetes Education  Author: Jordyn Carr RD  Date: 11/7/2018    Diabetes Care Management Summary  Diabetes Education Record Assessment: Initial    Current Diabetes Risk Level: High     Last A1c:   Lab Results   Component Value Date    HGBA1C 12.1 (H) 10/19/2018     Last Visit with Diabetes Educator: none noted    Last OPCM Referral: : Not Found   Enrolled in OPCM: No      Diabetes Type  Diabetes Type : Type II    Diabetes History  Diabetes Diagnosis: >10 years (dx ~ 2006)    Current Treatment: Insulin (Lantus 32 units daily; Humalog 12 units AC + 180-230 +2)        Health Maintenance was reviewed today with patient. Discussed with patient importance of routine eye exams, foot exams/foot care, blood work (i.e.: A1c, microalbumin, and lipid), dental visits, yearly flu vaccine, and pneumonia vaccine as indicated by PCP. Patient verbalized understanding.     Health Maintenance Topics with due status: Not Due       Topic Last Completion Date    DEXA SCAN 12/15/2016    Colonoscopy 01/13/2017    Mammogram 03/31/2017    Pneumococcal (65+) 09/27/2017    Eye Exam 08/23/2018    Hemoglobin A1c 10/19/2018    Foot Exam 10/24/2018     Health Maintenance Due   Topic Date Due    TETANUS VACCINE  07/12/1970    Lipid Panel  09/21/2018       Nutrition  Meal Planning: 3 meals per day (see upload)    Monitoring   Self Monitoring : (SMBG 4-5 times daily)  Blood Glucose Logs: Yes (see upload)  Do you use a personal continuous glucose monitor?: No  In the last month, how often have you had a low blood sugar reaction?: more than once a week (a few 50s-60s, from taking Humalog too close together, or not having enough carbs at meals)  What are your symptoms of low blood sugar?: (confused, weak)  How do you treat low blood sugar?: (crackers, juice, or candy)    Exercise   Exercise Type: none      Social History  Preferred Learning Method: Face to Face  Primary Support: Self  Smoking Status: Never a Smoker  DDS-2 Score  ( > 3 = SIGNIFICANT  DISTRESS): 2.5     Barriers to Change: None  Learning Challenges : None  Readiness to Learn : Acceptance  Cultural Influences: No        Diabetes Education Assessment/Progress  Diabetes Disease Process (diabetes disease process and treatment options): Discussion, Instructed, Individual Session, Written Materials Provided  Nutrition (Incorporating nutritional management into one's lifestyle): Discussion, Instructed, Individual Session, Written Materials Provided  Medications (states correct name, dose, onset, peak, duration, side effects & timing of meds): Discussion, Instructed, Individual Session, Written Materials Provided  Monitoring (monitoring blood glucose/other parameters & using results): Discussion, Instructed, Individual Session, Written Materials Provided  Acute Complications (preventing, detecting, and treating acute complications): Discussion, Instructed, Individual Session, Written Materials Provided, Comprehends Key Points  Chronic Complications (preventing, detecting, and treating chronic complications): Discussion, Written Materials Provided  Cognitive (knowledge of self-management skills, functional health literacy): Individual Session  Diabetes Distress and Support Systems: Individual Session  Behavioral (readiness for change, lifestyle practices, self-care behaviors): Individual Session        Goals  Patient has selected/evaluated goals during today's session: Yes, selected    Healthy Eating: Se t(Monitor carb intake at meals so to match to appropriate amount of insulin needed)  Start Date: 11/07/18    Medications: Set (Take Humalog doses as instructed)  Start Date: 11/07/18         Diabetes Care Plan/Intervention  Education Plan/Intervention: Individual Follow-Up DSMT (1 mo f/u scheduled)        Diabetes Meal Plan  Restrictions: Restricted Carbohydrate  Carbohydrate Per Meal: 30-45g  Carbohydrate Per Snack : (0-5 gm)      Today's Self-Management Care Plan was developed with the patient's input  and is based on barriers identified during today's assessment.    The long and short-term goals in the care plan were written with the patient/caregiver's input. The patient has agreed to work toward these goals to improve her overall diabetes control.      The patient received a copy of today's self-management plan and verbalized understanding of the care plan, goals, and all of today's instructions.      The patient was encouraged to communicate with her physician and care team regarding her condition(s) and treatment.  I provided the patient with my contact information today and encouraged her to contact me via phone or patient portal as needed.         Education Units of Time   Time Spent: 60 min

## 2018-11-07 NOTE — Clinical Note
BG logs and food log attached to my note. She is sometimes taking her breakfast and lunch Humalog within 3 hours of each other, that's why I think she's dropping in the afternoon - so we discussed that. I also went over basic carb counting - told her only to take half (6 units) if she's not eating many carbs, or take full 12 units if eating >45 gm. Let me know if you want to do anything different!

## 2018-11-12 ENCOUNTER — TELEPHONE (OUTPATIENT)
Dept: RHEUMATOLOGY | Facility: CLINIC | Age: 68
End: 2018-11-12

## 2018-11-12 DIAGNOSIS — G89.29 CHRONIC BILATERAL LOW BACK PAIN WITHOUT SCIATICA: ICD-10-CM

## 2018-11-12 DIAGNOSIS — M54.50 CHRONIC BILATERAL LOW BACK PAIN WITHOUT SCIATICA: ICD-10-CM

## 2018-11-12 RX ORDER — METHOCARBAMOL 750 MG/1
750 TABLET, FILM COATED ORAL 3 TIMES DAILY PRN
Qty: 180 TABLET | Refills: 0 | Status: SHIPPED | OUTPATIENT
Start: 2018-11-12 | End: 2018-11-12

## 2018-11-12 RX ORDER — METHOCARBAMOL 750 MG/1
TABLET, FILM COATED ORAL
Qty: 180 TABLET | Refills: 0 | Status: SHIPPED | OUTPATIENT
Start: 2018-11-12 | End: 2018-12-06 | Stop reason: SDUPTHER

## 2018-11-18 RX ORDER — TRAZODONE HYDROCHLORIDE 100 MG/1
TABLET ORAL
Qty: 45 TABLET | Refills: 1 | Status: SHIPPED | OUTPATIENT
Start: 2018-11-18 | End: 2019-01-21 | Stop reason: SDUPTHER

## 2018-11-19 RX ORDER — GEMFIBROZIL 600 MG/1
TABLET, FILM COATED ORAL
Qty: 60 TABLET | Refills: 2 | Status: SHIPPED | OUTPATIENT
Start: 2018-11-19 | End: 2019-12-27

## 2018-12-01 ENCOUNTER — PATIENT MESSAGE (OUTPATIENT)
Dept: INTERNAL MEDICINE | Facility: CLINIC | Age: 68
End: 2018-12-01

## 2018-12-01 DIAGNOSIS — E78.2 MIXED HYPERLIPIDEMIA: ICD-10-CM

## 2018-12-01 DIAGNOSIS — E03.9 ACQUIRED HYPOTHYROIDISM: Primary | ICD-10-CM

## 2018-12-06 ENCOUNTER — PATIENT MESSAGE (OUTPATIENT)
Dept: INTERNAL MEDICINE | Facility: CLINIC | Age: 68
End: 2018-12-06

## 2018-12-06 ENCOUNTER — OFFICE VISIT (OUTPATIENT)
Dept: UROLOGY | Facility: CLINIC | Age: 68
End: 2018-12-06
Payer: MEDICARE

## 2018-12-06 ENCOUNTER — PATIENT MESSAGE (OUTPATIENT)
Dept: NEUROLOGY | Facility: CLINIC | Age: 68
End: 2018-12-06

## 2018-12-06 VITALS
RESPIRATION RATE: 15 BRPM | HEIGHT: 68 IN | SYSTOLIC BLOOD PRESSURE: 141 MMHG | WEIGHT: 285 LBS | HEART RATE: 78 BPM | BODY MASS INDEX: 43.19 KG/M2 | DIASTOLIC BLOOD PRESSURE: 91 MMHG

## 2018-12-06 DIAGNOSIS — G89.29 CHRONIC BILATERAL LOW BACK PAIN WITHOUT SCIATICA: ICD-10-CM

## 2018-12-06 DIAGNOSIS — L92.9 GRANULATION TISSUE: ICD-10-CM

## 2018-12-06 DIAGNOSIS — Z93.59 CHRONIC SUPRAPUBIC CATHETER: ICD-10-CM

## 2018-12-06 DIAGNOSIS — Z43.5 ENCOUNTER FOR CARE OR REPLACEMENT OF SUPRAPUBIC TUBE: ICD-10-CM

## 2018-12-06 DIAGNOSIS — N31.9 NEUROGENIC BLADDER: Primary | ICD-10-CM

## 2018-12-06 DIAGNOSIS — M54.50 CHRONIC BILATERAL LOW BACK PAIN WITHOUT SCIATICA: ICD-10-CM

## 2018-12-06 PROCEDURE — 99215 OFFICE O/P EST HI 40 MIN: CPT | Mod: PBBFAC | Performed by: NURSE PRACTITIONER

## 2018-12-06 PROCEDURE — 51705 CHANGE OF BLADDER TUBE: CPT | Mod: S$PBB,,, | Performed by: NURSE PRACTITIONER

## 2018-12-06 PROCEDURE — 99999 PR PBB SHADOW E&M-EST. PATIENT-LVL V: CPT | Mod: PBBFAC,,, | Performed by: NURSE PRACTITIONER

## 2018-12-06 PROCEDURE — 51705 CHANGE OF BLADDER TUBE: CPT | Mod: PBBFAC | Performed by: NURSE PRACTITIONER

## 2018-12-06 PROCEDURE — 17250 CHEM CAUT OF GRANLTJ TISSUE: CPT | Mod: PBBFAC | Performed by: NURSE PRACTITIONER

## 2018-12-06 PROCEDURE — 99214 OFFICE O/P EST MOD 30 MIN: CPT | Mod: S$PBB,25,, | Performed by: NURSE PRACTITIONER

## 2018-12-06 PROCEDURE — 17250 CHEM CAUT OF GRANLTJ TISSUE: CPT | Mod: S$PBB,51,, | Performed by: NURSE PRACTITIONER

## 2018-12-06 RX ORDER — METHOCARBAMOL 750 MG/1
TABLET, FILM COATED ORAL
Qty: 180 TABLET | Refills: 0 | Status: SHIPPED | OUTPATIENT
Start: 2018-12-06 | End: 2019-01-06 | Stop reason: SDUPTHER

## 2018-12-06 RX ORDER — HYDROCODONE BITARTRATE AND ACETAMINOPHEN 10; 325 MG/1; MG/1
1 TABLET ORAL EVERY 6 HOURS PRN
Qty: 120 TABLET | Refills: 0 | Status: SHIPPED | OUTPATIENT
Start: 2018-12-07 | End: 2019-01-03 | Stop reason: SDUPTHER

## 2018-12-06 NOTE — PROGRESS NOTES
Subjective:       Patient ID: Cecile Bowen is a 68 y.o. female.    Chief Complaint: Neurogenic Bladder (SPT change)    Cecile Bowen is a 68 y.o. Female with history of bilateral hydronephrosis, incomplete bladder emptying which is managed by SPT (16fr).  S/p Cystoscopy with bladder botox injection (300u) 09/17/2018 with Dr. Whittington.    Her last SPT change was 11/07/2018.   Here today for scheduled SPT change Bladder spasm are greatly reduced; pleased with botox.    No fever, n/v              Past Medical History:    Diabetes type 2, uncontrolled                   12/12/2012    Obesity                                         12/12/2012    Hypertension                                    12/12/2012    DJD (degenerative joint disease)                12/12/2012    Hypothyroidism                                  12/12/2012    Nuclear sclerosis - Both Eyes                   3/24/2014     Migraine                                                      Past Surgical History:    TOTAL ABDOMINAL HYSTERECTOMY W/ BILATERAL SALP*  1985          GALLBLADDER SURGERY                              2006          TONSILLECTOMY, ADENOIDECTOMY                                   OVARIAN CYST SURGERY                             1985          HYSTERECTOMY                                                   DILATION AND CURETTAGE OF UTERUS                               Review of patient's family history indicates:    Diabetes                       Sister                    Kidney disease                 Sister                    ALS                            Mother                      Comment: d.    Cancer                         Maternal Grandmother        Comment: d. colon    Cancer                         Paternal Grandfather        Comment: d. lung    Diabetes                       Maternal Aunt             Kidney disease                 Maternal Aunt             Diabetes                       Maternal Uncle            Amblyopia      "                 Neg Hx                    Blindness                      Neg Hx                    Cataracts                      Neg Hx                    Glaucoma                       Neg Hx                    Macular degeneration           Neg Hx                    Retinal detachment             Neg Hx                    Strabismus                     Neg Hx                      Social History    Marital Status:             Spouse Name:                       Years of Education:                 Number of children:               Occupational History    None on file    Social History Main Topics    Smoking Status: Never Smoker                      Smokeless Status: Never Used                        Alcohol Use: No              Drug Use: No              Sexual Activity: Not Currently           Birth Control/Protection: Abstinence    Other Topics            Concern    None on file    Social History Narrative    Single      Lives w/ sister  And brother     both are helping her during her post hosp recovery period        Allergies:  Review of patient's allergies indicates no known allergies.    Medications:  Current outpatient prescriptions:amitriptyline (ELAVIL) 10 MG tablet, TAKE 3 TABLETS BY MOUTH IN THE EVENING, Disp: 90 tablet, Rfl: 5;  aspirin (ECOTRIN) 81 MG EC tablet, Take 81 mg by mouth once daily., Disp: , Rfl: ;  BD INSULIN PEN NEEDLE UF SHORT 31 X 5/16 " Ndle, USE ONCE DAILY WITH LANTUS SOLOSTAR PEN INSULIN, Disp: 100 each, Rfl: 11  butalbital-acetaminophen-caffeine -40 mg (FIORICET, ESGIC) -40 mg per tablet, TAKE 1 TABLET EVERY 4 TO 6 HOURS, Disp: 30 tablet, Rfl: 0;  cyanocobalamin, vitamin B-12, (VITAMIN B-12) 2,500 mcg Subl, Place 2,500 mcg under the tongue once daily., Disp: , Rfl: ;  diphenhydrAMINE (BENADRYL) 25 mg capsule, Take 25 mg by mouth every 6 (six) hours as needed., Disp: , Rfl:   ferrous sulfate 325 (65 FE) MG EC tablet, Take 325 mg by mouth 3 (three) times daily with " "meals., Disp: , Rfl: ;  fluticasone (FLONASE) 50 mcg/actuation nasal spray, 1 SPRAY IN EACH NOSTRIL DAILY (Patient taking differently: 1 SPRAY IN EACH NOSTRIL DAILY PRN), Disp: 16 g, Rfl: 1;  furosemide (LASIX) 20 MG tablet, Take 1 tablet (20 mg total) by mouth once daily., Disp: 4 tablet, Rfl: 0  gemfibrozil (LOPID) 600 MG tablet, TAKE 1 TABLET BY MOUTH TWICE A DAY, Disp: 60 tablet, Rfl: 5;  (START ON 4/29/2015) hydrocodone-acetaminophen 10-325mg (NORCO)  mg Tab, Take 1 tablet by mouth every 6 (six) hours as needed., Disp: 120 tablet, Rfl: 0;  insulin lispro (HUMALOG) 100 unit/mL injection, Inject 7 Units into the skin 3 (three) times daily before meals., Disp: 6.3 mL, Rfl: 11  insulin syringe-needle U-100 (BD INSULIN SYRINGE ULTRA-FINE) 1/2 mL 31 x 15/64" Syrg, 1 Box by Misc.(Non-Drug; Combo Route) route 4 (four) times daily., Disp: 100 Syringe, Rfl: 12;  lancets (ONE TOUCH DELICA LANCETS) Misc, Ultra 2 lancets to check blood sugar BID, Disp: 100 each, Rfl: 6;  LANTUS SOLOSTAR 100 unit/mL (3 mL) InPn pen, , Disp: , Rfl: 3  LIDODERM 5 %(700 mg/patch), APPLY 1 PATCH TO SKIN DAILY (LEAVING ON FOR 12 HOURS AND OFF FOR 12 HOURS), Disp: 30 patch, Rfl: 6;  magnesium oxide (MAG-OX) 400 mg tablet, TAKE 1 TABLET (400 MG TOTAL) BY MOUTH 2 (TWO) TIMES DAILY., Disp: 60 tablet, Rfl: 11;  methocarbamol (ROBAXIN) 750 MG Tab, TAKE 1 TO 2 TABLETS BY MOUTH 3 TIMES A DAY (Patient taking differently: Take 2 tablets twice daily), Disp: 120 tablet, Rfl: 3  methocarbamol (ROBAXIN) 750 MG Tab, TAKE 1 TO 2 TABLETS BY MOUTH 3 TIMES A DAY, Disp: 120 tablet, Rfl: 3;  methylPREDNISolone (MEDROL DOSEPACK) 4 mg tablet, use as directed, Disp: 21 tablet, Rfl: 0;  multivitamin with minerals tablet, Take 1 tablet by mouth once daily., Disp: , Rfl: ;  pravastatin (PRAVACHOL) 40 MG tablet, TAKE 1 TABLET BY MOUTH EVERY DAY, Disp: 30 tablet, Rfl: 2  pyridoxine (B-6) 100 MG Tab, Take 1 tablet (100 mg total) by mouth once daily., Disp: 30 tablet, " Rfl: 12;  scopolamine (TRANSDERM-SCOP) 1.5 mg, Place 1 patch (1.5 mg total) onto the skin every 72 hours., Disp: 4 patch, Rfl: 0;  sulfamethoxazole-trimethoprim 800-160mg (BACTRIM DS) 800-160 mg Tab, Take 1 tablet by mouth 2 (two) times daily., Disp: , Rfl: 0  sumatriptan (IMITREX) 100 MG tablet, TAKE 1 TABLET (100 MG TOTAL) BY MOUTH ONCE., Disp: 9 tablet, Rfl: 6;  SYNTHROID 125 mcg tablet, TAKE 1 TABLET BY MOUTH DAILY, Disp: 90 tablet, Rfl: 4;  topiramate (TOPAMAX) 100 MG tablet, TAKE 1 TABLET (100 MG TOTAL) BY MOUTH 2 (TWO) TIMES DAILY., Disp: 60 tablet, Rfl: 11;  topiramate (TOPAMAX) 25 MG tablet, Take 4 tablets (100 mg total) by mouth 2 (two) times daily., Disp: 240 tablet, Rfl: 5  venlafaxine (EFFEXOR-XR) 150 MG Cp24, TAKE 1 CAPSULE BY MOUTH ONCE DAILY, Disp: 30 capsule, Rfl: 2;  vitamin D (VITAMIN D3) 185 MG Tab, Take 185 mg by mouth once daily., Disp: , Rfl:           Review of Systems   Constitutional: Negative.  Negative for chills and fever.   HENT: Negative for facial swelling and trouble swallowing.    Eyes: Negative for visual disturbance.   Respiratory: Negative for cough, chest tightness, shortness of breath and wheezing.    Cardiovascular: Negative for chest pain and palpitations.   Gastrointestinal: Negative for abdominal pain, constipation, nausea and vomiting.   Genitourinary: Positive for difficulty urinating. Negative for dysuria, hematuria, urgency and vaginal bleeding.        SPT draining well     Musculoskeletal: Positive for arthralgias and gait problem.   Skin: Negative for rash.   Neurological: Positive for headaches. Negative for dizziness.        Migraines     Hematological: Does not bruise/bleed easily.   Psychiatric/Behavioral: Negative for behavioral problems.       Objective:      Physical Exam   Nursing note and vitals reviewed.  Constitutional: She is oriented to person, place, and time. Vital signs are normal. She appears well-developed and well-nourished. She is active and  cooperative.   HENT:   Head: Normocephalic.   Right Ear: External ear normal.   Left Ear: External ear normal.   Nose: Nose normal.   Eyes: Conjunctivae and lids are normal. Right eye exhibits no discharge. Left eye exhibits no discharge. No scleral icterus.   Neck: Trachea normal and normal range of motion. Neck supple. No tracheal deviation present.   Cardiovascular: Normal rate, regular rhythm and intact distal pulses.    Pulmonary/Chest: Effort normal. No respiratory distress.   Abdominal: Soft. Bowel sounds are normal. She exhibits no distension and no mass. There is no tenderness. There is no rebound, no guarding and no CVA tenderness.       Musculoskeletal: She exhibits no edema.   Neurological: She is alert and oriented to person, place, and time.   Skin: Skin is warm, dry and intact.     Psychiatric: She has a normal mood and affect. Her behavior is normal. Judgment and thought content normal.       Assessment:       1. Neurogenic bladder    2. Chronic suprapubic catheter    3. Encounter for care or replacement of suprapubic tube    4. Granulation tissue        Plan:        I spent 25 minutes with the patient of which more than half was spent in direct consultation with the patient in regards to our treatment and plan.    Education and recommendations of today's plan of care including home remedies.  I easily removed her old SPT then replaced with a new 16fr SPT using sterile technique.  Stoma friable. Silver nitrate sticks x 3 to granulation tissue.  Irrigated bladder to verify the position.  Balloon inflated with ~10cc sterile water.  dsg applied.  Tolerated well.   RTC one month

## 2018-12-07 RX ORDER — SODIUM BICARBONATE 650 MG/1
TABLET ORAL
Qty: 270 TABLET | Refills: 2 | Status: SHIPPED | OUTPATIENT
Start: 2018-12-07 | End: 2020-02-01

## 2018-12-07 RX ORDER — LANOLIN ALCOHOL/MO/W.PET/CERES
CREAM (GRAM) TOPICAL
Qty: 180 TABLET | Refills: 1 | Status: SHIPPED | OUTPATIENT
Start: 2018-12-07 | End: 2019-09-23 | Stop reason: SDUPTHER

## 2018-12-11 ENCOUNTER — TELEPHONE (OUTPATIENT)
Dept: PHARMACY | Facility: CLINIC | Age: 68
End: 2018-12-11

## 2018-12-11 DIAGNOSIS — G43.909 MIXED MIGRAINE AND MUSCLE CONTRACTION HEADACHE: Primary | ICD-10-CM

## 2018-12-11 DIAGNOSIS — G44.209 MIXED MIGRAINE AND MUSCLE CONTRACTION HEADACHE: Primary | ICD-10-CM

## 2018-12-11 RX ORDER — METHYLPREDNISOLONE 4 MG/1
TABLET ORAL
Qty: 1 PACKAGE | Refills: 0 | Status: SHIPPED | OUTPATIENT
Start: 2018-12-11 | End: 2019-01-01

## 2018-12-11 NOTE — TELEPHONE ENCOUNTER
DOCUMENTATION ONLY:  Prior Authorization for [employer] approved from 12/11/18 to 12/31/19    Case Id: [id number]    Co-pay: $216.02    Patient Assistance IS required and is being researched.     -ARR

## 2018-12-13 ENCOUNTER — LAB VISIT (OUTPATIENT)
Dept: LAB | Facility: HOSPITAL | Age: 68
End: 2018-12-13
Attending: INTERNAL MEDICINE
Payer: MEDICARE

## 2018-12-13 DIAGNOSIS — E78.2 MIXED HYPERLIPIDEMIA: ICD-10-CM

## 2018-12-13 DIAGNOSIS — E03.9 ACQUIRED HYPOTHYROIDISM: ICD-10-CM

## 2018-12-13 LAB
CHOLEST SERPL-MCNC: 189 MG/DL
CHOLEST/HDLC SERPL: 3.7 {RATIO}
ESTIMATED AVG GLUCOSE: 197 MG/DL
HBA1C MFR BLD HPLC: 8.5 %
HDLC SERPL-MCNC: 51 MG/DL
HDLC SERPL: 27 %
LDLC SERPL CALC-MCNC: 100 MG/DL
NONHDLC SERPL-MCNC: 138 MG/DL
T4 FREE SERPL-MCNC: 0.85 NG/DL
TRIGL SERPL-MCNC: 190 MG/DL
TSH SERPL DL<=0.005 MIU/L-ACNC: 5.21 UIU/ML

## 2018-12-13 PROCEDURE — 36415 COLL VENOUS BLD VENIPUNCTURE: CPT

## 2018-12-13 PROCEDURE — 84439 ASSAY OF FREE THYROXINE: CPT

## 2018-12-13 PROCEDURE — 83036 HEMOGLOBIN GLYCOSYLATED A1C: CPT

## 2018-12-13 PROCEDURE — 84443 ASSAY THYROID STIM HORMONE: CPT

## 2018-12-13 PROCEDURE — 80061 LIPID PANEL: CPT

## 2018-12-17 RX ORDER — PRAVASTATIN SODIUM 40 MG/1
40 TABLET ORAL DAILY
Qty: 90 TABLET | Refills: 0 | Status: SHIPPED | OUTPATIENT
Start: 2018-12-17 | End: 2019-01-21 | Stop reason: ALTCHOICE

## 2018-12-19 ENCOUNTER — TELEPHONE (OUTPATIENT)
Dept: INTERNAL MEDICINE | Facility: CLINIC | Age: 68
End: 2018-12-19

## 2018-12-19 RX ORDER — LEVOTHYROXINE SODIUM 50 UG/1
TABLET ORAL
Qty: 30 TABLET | Refills: 8 | Status: SHIPPED | OUTPATIENT
Start: 2018-12-19 | End: 2019-06-11 | Stop reason: SDUPTHER

## 2018-12-19 NOTE — TELEPHONE ENCOUNTER
----- Message from Smita Martinez sent at 12/19/2018 12:17 PM CST -----  Contact: [phone]   Caller is requesting a sooner appointment. Caller declined first available appointment listed below. Caller will not accept being placed on the wait list and is requesting a message be sent to the provider.    When is the next available appointment:  04/09/19  Did you offer to schedule the next available appt and put the patient on the wait list?:   yes  What visit type: EPP  Symptoms:    Patient preference of timeframe to be scheduled:  Soon  What is the reason the patient is requesting a sooner appointment? (insurance terminating, changing jobs):    Would you prefer an answer via [facility]?:  No  Comments:  PT states she had to cancel her appt for today because she is having back pains

## 2018-12-19 NOTE — TELEPHONE ENCOUNTER
Pt called. No answer. Left message to call back to discuss scheduling her physical with resident clinic.

## 2018-12-20 ENCOUNTER — TELEPHONE (OUTPATIENT)
Dept: INTERNAL MEDICINE | Facility: CLINIC | Age: 68
End: 2018-12-20

## 2018-12-20 NOTE — TELEPHONE ENCOUNTER
----- Message from Lurdes Navarro MD sent at 12/20/2018  4:16 PM CST -----  Please review your lab work and we will discuss at your pending office visit.  Lurdes Cancino

## 2019-01-03 DIAGNOSIS — G89.29 CHRONIC BILATERAL LOW BACK PAIN WITHOUT SCIATICA: ICD-10-CM

## 2019-01-03 DIAGNOSIS — M54.50 CHRONIC BILATERAL LOW BACK PAIN WITHOUT SCIATICA: ICD-10-CM

## 2019-01-03 RX ORDER — HYDROCODONE BITARTRATE AND ACETAMINOPHEN 10; 325 MG/1; MG/1
1 TABLET ORAL EVERY 6 HOURS PRN
Qty: 120 TABLET | Refills: 0 | Status: SHIPPED | OUTPATIENT
Start: 2019-01-03 | End: 2019-02-04 | Stop reason: SDUPTHER

## 2019-01-06 DIAGNOSIS — G89.29 CHRONIC BILATERAL LOW BACK PAIN WITHOUT SCIATICA: ICD-10-CM

## 2019-01-06 DIAGNOSIS — M54.50 CHRONIC BILATERAL LOW BACK PAIN WITHOUT SCIATICA: ICD-10-CM

## 2019-01-07 RX ORDER — METHOCARBAMOL 750 MG/1
TABLET, FILM COATED ORAL
Qty: 180 TABLET | Refills: 0 | Status: SHIPPED | OUTPATIENT
Start: 2019-01-07 | End: 2019-02-08 | Stop reason: SDUPTHER

## 2019-01-09 ENCOUNTER — OFFICE VISIT (OUTPATIENT)
Dept: UROLOGY | Facility: CLINIC | Age: 69
End: 2019-01-09
Payer: MEDICARE

## 2019-01-09 VITALS
SYSTOLIC BLOOD PRESSURE: 177 MMHG | HEART RATE: 103 BPM | HEIGHT: 68 IN | BODY MASS INDEX: 43.19 KG/M2 | DIASTOLIC BLOOD PRESSURE: 79 MMHG | WEIGHT: 285 LBS

## 2019-01-09 DIAGNOSIS — N31.9 NEUROGENIC BLADDER: Primary | ICD-10-CM

## 2019-01-09 DIAGNOSIS — Z43.5 ENCOUNTER FOR CARE OR REPLACEMENT OF SUPRAPUBIC TUBE: ICD-10-CM

## 2019-01-09 DIAGNOSIS — Z93.59 CHRONIC SUPRAPUBIC CATHETER: ICD-10-CM

## 2019-01-09 PROCEDURE — 51705 PR CHANGE OF BLADDER TUBE,SIMPLE: ICD-10-PCS | Mod: S$PBB,,, | Performed by: NURSE PRACTITIONER

## 2019-01-09 PROCEDURE — 99213 OFFICE O/P EST LOW 20 MIN: CPT | Mod: PBBFAC | Performed by: NURSE PRACTITIONER

## 2019-01-09 PROCEDURE — 99999 PR PBB SHADOW E&M-EST. PATIENT-LVL III: ICD-10-PCS | Mod: PBBFAC,,, | Performed by: NURSE PRACTITIONER

## 2019-01-09 PROCEDURE — 51705 CHANGE OF BLADDER TUBE: CPT | Mod: S$PBB,,, | Performed by: NURSE PRACTITIONER

## 2019-01-09 PROCEDURE — 99999 PR PBB SHADOW E&M-EST. PATIENT-LVL III: CPT | Mod: PBBFAC,,, | Performed by: NURSE PRACTITIONER

## 2019-01-09 PROCEDURE — 99214 OFFICE O/P EST MOD 30 MIN: CPT | Mod: S$PBB,25,, | Performed by: NURSE PRACTITIONER

## 2019-01-09 PROCEDURE — 51705 CHANGE OF BLADDER TUBE: CPT | Mod: PBBFAC | Performed by: NURSE PRACTITIONER

## 2019-01-09 PROCEDURE — 99214 PR OFFICE/OUTPT VISIT, EST, LEVL IV, 30-39 MIN: ICD-10-PCS | Mod: S$PBB,25,, | Performed by: NURSE PRACTITIONER

## 2019-01-09 NOTE — PROGRESS NOTES
Subjective:       Patient ID: Cecile Bowen is a 68 y.o. female.    Chief Complaint: Neurogenic bladder (chronic suprapubic)    Cecile Bowen is a 68 y.o. Female with history of bilateral hydronephrosis, incomplete bladder emptying which is managed by SPT (16fr).  S/p Cystoscopy with bladder botox injection (300u) 09/17/2018 with Dr. Whittington.    Her last SPT change was 12/06/2018.   Here today for scheduled SPT change Bladder spasm are greatly reduced; pleased with botox.    No fever, n/v              Past Medical History:    Diabetes type 2, uncontrolled                   12/12/2012    Obesity                                         12/12/2012    Hypertension                                    12/12/2012    DJD (degenerative joint disease)                12/12/2012    Hypothyroidism                                  12/12/2012    Nuclear sclerosis - Both Eyes                   3/24/2014     Migraine                                                      Past Surgical History:    TOTAL ABDOMINAL HYSTERECTOMY W/ BILATERAL SALP*  1985          GALLBLADDER SURGERY                              2006          TONSILLECTOMY, ADENOIDECTOMY                                   OVARIAN CYST SURGERY                             1985          HYSTERECTOMY                                                   DILATION AND CURETTAGE OF UTERUS                               Review of patient's family history indicates:    Diabetes                       Sister                    Kidney disease                 Sister                    ALS                            Mother                      Comment: d.    Cancer                         Maternal Grandmother        Comment: d. colon    Cancer                         Paternal Grandfather        Comment: d. lung    Diabetes                       Maternal Aunt             Kidney disease                 Maternal Aunt             Diabetes                       Maternal Uncle             "Amblyopia                      Neg Hx                    Blindness                      Neg Hx                    Cataracts                      Neg Hx                    Glaucoma                       Neg Hx                    Macular degeneration           Neg Hx                    Retinal detachment             Neg Hx                    Strabismus                     Neg Hx                      Social History    Marital Status:             Spouse Name:                       Years of Education:                 Number of children:               Occupational History    None on file    Social History Main Topics    Smoking Status: Never Smoker                      Smokeless Status: Never Used                        Alcohol Use: No              Drug Use: No              Sexual Activity: Not Currently           Birth Control/Protection: Abstinence    Other Topics            Concern    None on file    Social History Narrative    Single      Lives w/ sister  And brother     both are helping her during her post hosp recovery period        Allergies:  Review of patient's allergies indicates no known allergies.    Medications:  Current outpatient prescriptions:amitriptyline (ELAVIL) 10 MG tablet, TAKE 3 TABLETS BY MOUTH IN THE EVENING, Disp: 90 tablet, Rfl: 5;  aspirin (ECOTRIN) 81 MG EC tablet, Take 81 mg by mouth once daily., Disp: , Rfl: ;  BD INSULIN PEN NEEDLE UF SHORT 31 X 5/16 " Ndle, USE ONCE DAILY WITH LANTUS SOLOSTAR PEN INSULIN, Disp: 100 each, Rfl: 11  butalbital-acetaminophen-caffeine -40 mg (FIORICET, ESGIC) -40 mg per tablet, TAKE 1 TABLET EVERY 4 TO 6 HOURS, Disp: 30 tablet, Rfl: 0;  cyanocobalamin, vitamin B-12, (VITAMIN B-12) 2,500 mcg Subl, Place 2,500 mcg under the tongue once daily., Disp: , Rfl: ;  diphenhydrAMINE (BENADRYL) 25 mg capsule, Take 25 mg by mouth every 6 (six) hours as needed., Disp: , Rfl:   ferrous sulfate 325 (65 FE) MG EC tablet, Take 325 mg by mouth 3 (three) times " "daily with meals., Disp: , Rfl: ;  fluticasone (FLONASE) 50 mcg/actuation nasal spray, 1 SPRAY IN EACH NOSTRIL DAILY (Patient taking differently: 1 SPRAY IN EACH NOSTRIL DAILY PRN), Disp: 16 g, Rfl: 1;  furosemide (LASIX) 20 MG tablet, Take 1 tablet (20 mg total) by mouth once daily., Disp: 4 tablet, Rfl: 0  gemfibrozil (LOPID) 600 MG tablet, TAKE 1 TABLET BY MOUTH TWICE A DAY, Disp: 60 tablet, Rfl: 5;  (START ON 4/29/2015) hydrocodone-acetaminophen 10-325mg (NORCO)  mg Tab, Take 1 tablet by mouth every 6 (six) hours as needed., Disp: 120 tablet, Rfl: 0;  insulin lispro (HUMALOG) 100 unit/mL injection, Inject 7 Units into the skin 3 (three) times daily before meals., Disp: 6.3 mL, Rfl: 11  insulin syringe-needle U-100 (BD INSULIN SYRINGE ULTRA-FINE) 1/2 mL 31 x 15/64" Syrg, 1 Box by Misc.(Non-Drug; Combo Route) route 4 (four) times daily., Disp: 100 Syringe, Rfl: 12;  lancets (ONE TOUCH DELICA LANCETS) Misc, Ultra 2 lancets to check blood sugar BID, Disp: 100 each, Rfl: 6;  LANTUS SOLOSTAR 100 unit/mL (3 mL) InPn pen, , Disp: , Rfl: 3  LIDODERM 5 %(700 mg/patch), APPLY 1 PATCH TO SKIN DAILY (LEAVING ON FOR 12 HOURS AND OFF FOR 12 HOURS), Disp: 30 patch, Rfl: 6;  magnesium oxide (MAG-OX) 400 mg tablet, TAKE 1 TABLET (400 MG TOTAL) BY MOUTH 2 (TWO) TIMES DAILY., Disp: 60 tablet, Rfl: 11;  methocarbamol (ROBAXIN) 750 MG Tab, TAKE 1 TO 2 TABLETS BY MOUTH 3 TIMES A DAY (Patient taking differently: Take 2 tablets twice daily), Disp: 120 tablet, Rfl: 3  methocarbamol (ROBAXIN) 750 MG Tab, TAKE 1 TO 2 TABLETS BY MOUTH 3 TIMES A DAY, Disp: 120 tablet, Rfl: 3;  methylPREDNISolone (MEDROL DOSEPACK) 4 mg tablet, use as directed, Disp: 21 tablet, Rfl: 0;  multivitamin with minerals tablet, Take 1 tablet by mouth once daily., Disp: , Rfl: ;  pravastatin (PRAVACHOL) 40 MG tablet, TAKE 1 TABLET BY MOUTH EVERY DAY, Disp: 30 tablet, Rfl: 2  pyridoxine (B-6) 100 MG Tab, Take 1 tablet (100 mg total) by mouth once daily., Disp: 30 " tablet, Rfl: 12;  scopolamine (TRANSDERM-SCOP) 1.5 mg, Place 1 patch (1.5 mg total) onto the skin every 72 hours., Disp: 4 patch, Rfl: 0;  sulfamethoxazole-trimethoprim 800-160mg (BACTRIM DS) 800-160 mg Tab, Take 1 tablet by mouth 2 (two) times daily., Disp: , Rfl: 0  sumatriptan (IMITREX) 100 MG tablet, TAKE 1 TABLET (100 MG TOTAL) BY MOUTH ONCE., Disp: 9 tablet, Rfl: 6;  SYNTHROID 125 mcg tablet, TAKE 1 TABLET BY MOUTH DAILY, Disp: 90 tablet, Rfl: 4;  topiramate (TOPAMAX) 100 MG tablet, TAKE 1 TABLET (100 MG TOTAL) BY MOUTH 2 (TWO) TIMES DAILY., Disp: 60 tablet, Rfl: 11;  topiramate (TOPAMAX) 25 MG tablet, Take 4 tablets (100 mg total) by mouth 2 (two) times daily., Disp: 240 tablet, Rfl: 5  venlafaxine (EFFEXOR-XR) 150 MG Cp24, TAKE 1 CAPSULE BY MOUTH ONCE DAILY, Disp: 30 capsule, Rfl: 2;  vitamin D (VITAMIN D3) 185 MG Tab, Take 185 mg by mouth once daily., Disp: , Rfl:           Review of Systems   Constitutional: Negative for chills and fever.   HENT: Negative for facial swelling and trouble swallowing.    Eyes: Negative for visual disturbance.   Respiratory: Negative for cough, chest tightness, shortness of breath and wheezing.    Cardiovascular: Negative for chest pain and palpitations.   Gastrointestinal: Negative for abdominal pain, constipation, nausea and vomiting.   Genitourinary: Positive for difficulty urinating. Negative for dysuria, hematuria, urgency and vaginal bleeding.        SPT draining well.     Musculoskeletal: Positive for arthralgias and gait problem.   Skin: Negative for rash.   Neurological: Positive for weakness and headaches. Negative for dizziness.   Hematological: Does not bruise/bleed easily.   Psychiatric/Behavioral: Negative for behavioral problems.       Objective:      Physical Exam   Nursing note and vitals reviewed.  Constitutional: She is oriented to person, place, and time. Vital signs are normal. She appears well-developed and well-nourished. She is active and cooperative.    HENT:   Head: Normocephalic.   Right Ear: External ear normal.   Left Ear: External ear normal.   Nose: Nose normal.   Eyes: Conjunctivae and lids are normal. Right eye exhibits no discharge. Left eye exhibits no discharge. No scleral icterus.   Neck: Trachea normal and normal range of motion. Neck supple. No tracheal deviation present.   Cardiovascular: Normal rate, regular rhythm and intact distal pulses.    Pulmonary/Chest: Effort normal. No respiratory distress.   Abdominal: Soft. Bowel sounds are normal. She exhibits no distension and no mass. There is no tenderness. There is no rebound and no guarding.       Musculoskeletal: Normal range of motion. She exhibits no edema.   Neurological: She is alert and oriented to person, place, and time.   Skin: Skin is warm, dry and intact.     Psychiatric: She has a normal mood and affect. Her behavior is normal. Judgment and thought content normal.       Assessment:       1. Neurogenic bladder    2. Chronic suprapubic catheter    3. Encounter for care or replacement of suprapubic tube        Plan:         I spent 25 minutes with the patient of which more than half was spent in direct consultation with the patient in regards to our treatment and plan.    Education and recommendations of today's plan of care including home remedies.  I easily removed her old SPT then replaced with a new 16fr SPT using sterile technique.  Irrigated bladder to verify the position.  Balloon inflated with ~10cc sterile water.  dsg applied.  Tolerated well.   RTC one month

## 2019-01-17 ENCOUNTER — CLINICAL SUPPORT (OUTPATIENT)
Dept: DIABETES | Facility: CLINIC | Age: 69
End: 2019-01-17
Payer: MEDICARE

## 2019-01-17 ENCOUNTER — PATIENT OUTREACH (OUTPATIENT)
Dept: DIABETES | Facility: CLINIC | Age: 69
End: 2019-01-17

## 2019-01-17 DIAGNOSIS — Z79.4 TYPE 2 DIABETES MELLITUS WITH HYPERGLYCEMIA, WITH LONG-TERM CURRENT USE OF INSULIN: Primary | ICD-10-CM

## 2019-01-17 DIAGNOSIS — E11.65 TYPE 2 DIABETES MELLITUS WITH HYPERGLYCEMIA, WITH LONG-TERM CURRENT USE OF INSULIN: Primary | ICD-10-CM

## 2019-01-17 PROCEDURE — G0108 DIAB MANAGE TRN  PER INDIV: HCPCS | Mod: PBBFAC | Performed by: DIETITIAN, REGISTERED

## 2019-01-17 NOTE — Clinical Note
Irielle - One or two hypos in the 50s-60s since I saw her last - I told her to change correction scales from 180-230 +2 to +1. She didn't bring logs but I told her we could adjust doses when she sends them via [facility]. Gabriella - She wants to try nadya -  can you start paperwork for her? She is testing 4-5 times per day. I will change my Nov note to say so - Brooke can you addend last note to say so? Anastasiia - can you call her to schedule f/u with Irielle? Last seen in October. Thanks!

## 2019-01-17 NOTE — PROGRESS NOTES
"Diabetes Education  Author: Jordyn Carr RD  Date: 1/17/2019    Diabetes Care Management Summary  Diabetes Education Record Progress: Comprehensive (2 mo f/u)      Current Diabetes Risk Level: Moderate (A1c decreased from 12.1 to 8.5% since initial visit)       Last A1c:   Lab Results   Component Value Date    HGBA1C 8.5 (H) 12/13/2018     Last visit with Diabetes Educator: 11/07/2018    Diabetes Type : Type II  Diabetes Diagnosis: >10 years (dx ~ 2006)      Current Treatment: Insulin  Lantus 32 units in AM;   Humalog 12 units AC + 180-230 +2        Reviewed Problem List with Patient: Yes    Health Maintenance was reviewed today with patient. Discussed with patient importance of routine eye exams, foot exams/foot care, blood work (i.e.: A1c, microalbumin, and lipid), dental visits, yearly flu vaccine, and pneumonia vaccine as indicated by PCP. Patient verbalized understanding.     Health Maintenance Topics with due status: Not Due       Topic Last Completion Date    DEXA SCAN 12/15/2016    Colonoscopy 01/13/2017    Mammogram 03/31/2017    Pneumococcal Vaccine (65+ Low/Medium Risk) 09/27/2017    Eye Exam 08/23/2018    Foot Exam 10/24/2018    Lipid Panel 12/13/2018    Hemoglobin A1c 12/13/2018     Health Maintenance Due   Topic Date Due    TETANUS VACCINE  07/12/1968       Nutrition  Meal Planning: 3 meals per day (similar to initial reports; lots of soup lately 2/2 decreased appetite, "knot in stomach")    Monitoring   Self Monitoring : (SMBG 4-5 times daily; -190; pre-meals: 100-200 )  Blood Glucose Logs: No (forgot to bring logs today but will send via [facility])  Do you use a personal continuous glucose monitor?: No  In the last month, how often have you had a low blood sugar reaction?: once (reports once a week in the 50s-60s)  What are your symptoms of low blood sugar?: (weak)  How do you treat low blood sugar?: (carries peanut butter crackers or milky way mini)    Exercise   Exercise Type: none (uses " scooter for ADLs)      Social History  Preferred Learning Method: Face to Face  Primary Support: Self  Smoking Status: Never a Smoker  Barriers to Change: None  Learning Challenges : None  Readiness to Learn : Acceptance  Cultural Influences: No          Diabetes Education Assessment/Progress  Medications (states correct name, dose, onset, peak, duration, side effects & timing of meds): Discussion, Instructed, Individual Session, Written Materials Provided, Comprehends Key Points       Reviewed doses, timing and MOA of insulin. Discussed with NP and instructed pt to change correciton scale to 180-230 +1 to help prevent hypos (was having 50-60s hypo maybe once a week). Needs to send BG logs for further insulin adjustments.     Monitoring (monitoring blood glucose/other parameters & using results): Discussion, Instructed, Individual Session, Written Materials Provided, Demonstration, Comprehends Key Points       Pt is interested in Freestyle Xiomara. She tests her BG 4-5 times daily and has been for >3 months. Occasional hypos; few BG's >200 usually related to food intake. Discussed basic use of Xiomara - will start paperwork for benefit investigation coverage.    Acute Complications (preventing, detecting, and treating acute complications): Discussion, Instructed, Individual Session, Written Materials Provided, Comprehends Key Points       Reviewed appropriate hypo treatment: glucose tabs, juice > pb crackers or chocolate candy.    Clinical (diabetes, other pertinent medical history, and relevant comorbidities reviewed during visit): Discussion       UTD on eye exam: due in August. Needs f/u with endo.    Cognitive (knowledge of self-management skills, functional health literacy): Individual Session       With some specific knowledge of diabetes management. Leaves with increased knowledge base - will benefit from f/u annually.    Diabetes Distress and Support Systems: Individual Session       No distress noted related to  diabetes management.           Goals  Patient has selected/evaluated goals during today's session: Yes, selected    Healthy Eating: In Progress (Monitor carb intake at meals so to match to appropriate amount of insulin needed)  Start Date: 11/07/18      Medications: % Met (Take Humalog doses as instructed)  Met Percentage : 100%  Start Date: 11/07/18         Diabetes Care Plan/Intervention  Education Plan/Intervention: Individual Follow-Up DSMT (f/u for possible nadya start; needs endo f/u)        Diabetes Meal Plan  Restrictions: Restricted Carbohydrate  Carbohydrate Per Meal: 30-45g  Carbohydrate Per Snack : (0-5 gm)        Today's Self-Management Care Plan was developed with the patient's input and is based on barriers identified during today's assessment.    The long and short-term goals in the care plan were written with the patient/caregiver's input. The patient has agreed to work toward these goals to improve her overall diabetes control.      The patient received a copy of today's self-management plan and verbalized understanding of the care plan, goals, and all of today's instructions.      The patient was encouraged to communicate with her physician and care team regarding her condition(s) and treatment.  I provided the patient with my contact information today and encouraged her to contact me via phone or patient portal as needed.         Education Units of Time   Time Spent: 60 min

## 2019-01-17 NOTE — PROGRESS NOTES
Filled out [facility] CGM form for Freestyle Xiomara.  Faxed CGM order to [facility] with signed chart notes and insurance information.

## 2019-01-20 PROBLEM — N18.4 CKD (CHRONIC KIDNEY DISEASE) STAGE 4, GFR 15-29 ML/MIN: Status: ACTIVE | Noted: 2019-01-20

## 2019-01-20 NOTE — PROGRESS NOTES
CC: f/up chronic medical problems, including DM w/ renal and neuro, CKD IV, HLD, HA's - migraine and tension,  And insomnia    And risks and complications associated w/ her chronic medical conditions  And Health Maintenance    68 y.o. female w/  DM w/ neuro/renal, tx w/ lantus  32 U   And Humalog 12 + SSI U premeal  Denied increased thirst, urination, or hypoglycemia episodes  Pt paying for meds for self and siblings  Working very hard to get BS under control  Checking BS 4 x daily , ac and hs  And lose some weight, BMI 43.33  A1C ==>8.5 ( was 12.1)     Dyslipidemia tx w/gemfibrozil 600 mg BID   and pravastatin 40mg( will change to rosuvastatin)  Denied unusual muscle pain or chest pain  LDL==>100, not @ goal     CKD, IV  EGFR==>22.7  Cr==> 2.2  Denied decreased urination  ++ hydration- water primarily  +HyperPTH, 2/2 renal ds       MOELLER, Dr. Henao  Re-evaluated her HA   Pt obtained VIJAYA screen for dx and possible CPAP   Not tolerating CPAP @ this time  Declined Botox  Had new migraine HA ashanti approved $200/month    Neck pain/HA, pt alternating  Imitrex, Excedrin Migraine  Requests Fioricet to use as 3rd agent, but advised me that Neurology did no want her to use > 2 x weekly  Neck mm are extremely tight, and w/ decreased ROM      Non smoker  NonETOH    HEALTH MAINTENANCE:  Cholesterol/labs: reviewed- eGFR==>22.1 ( uli 15.4)  C-scope: Dr. Ho, 1/2017- nl, rec 5 yrs  WWE:gyn , no sx  Mammo: pending, pt wants to schedule   DEXA:  12/2016, pending  EYE exam: UTD, , - [facility]ON; early cataracts, 2019  DDS exam: dentures,upper and lower    VACCINATIONS:  TDAP: pending  Pneumovax: 9/2016  Prevnar, 2017  Zostavax: 2013  Shingrix, pt to consider  Flu, yearly    MEDCARD: Reviewed  ROS:  No fever, chills, or night sweats  No visual disturbance or d/c  No ear or sinus pain or pressure  No dysphagia or early satiety  No chest pain or palpitations  No cough or wheezing  No PND or orthopnea  No GERD or abdominal pain  No change in  bowels or blood in stool  No hematuria or dysuria;   No skin rashes or lesions  No joint swelling or erythema  No unusual HA or focal deficits  No cold or heat intolerance  No increased thirst or urination  No unusual bruising or bleeding  ADL's: decreased physically, more difficulty standing and getting dressed   but still in charge of finances, and meds ;   +  mops and cleans bathrooms  Brother does cooking and his scoliosis is progressing and they have to order food often  She is trying to eat healthy, but take food often precludes goal; often gets food from [facility]'s  AD's: + will, no living will, wants all measures done if there is hope of returning to her baseline,    no M/POA, go to person would be her sister   ( 3 children in family, she and sister and brother live together)  Mental Health: MDD, Rx venlafaxine, no SI  Memory: concerns expressed by sibs, Neuro evel was unremarkable   Nutrition: challenged, ADL's decreased  Safety: intact  Gait: Using scooter  Urinary incontinence: permanent SPT  Remainder of review negative except as previously noted    PMHX:Reviewed  PSHX: Reviewed  SHX: Reviewed      PE:  VS: As noted  GENERAL: Well developed well nourished, obses female,alert and oriented in NAD  Conversant and co-operative; pleasant as always  EYES: Conjunctiva and lids normal, sclera anicteric  ENT: Hearing intact; canals free of cerumen , TM's unremarkable  Nasal mucosa/turbinates/septum unremarkable;   oropharynx w/o lesions or stridor; mucus membranes dry  Sinus nontender  NECK: Supple w/o thyromegaly or lymphadenopathy  RESPIRATORY: Efforts are unlabored; lungs are CTAP  CARDIOVASCULAR: Heart RRR w/o mumur, gallop or rub; No carotid bruits noted  1+ pedal pulses; trace LE edema noted  GASTROINTESTINAL: Bowel sounds are present, soft, nontender, nondistended  No hepatosplenomegaly noted.  MUSCULOSKELETAL: Gait impaired, using scooter  Neck: decreased ROM, tight and tender over the left  especially  No clubbing, cyanosis, or edema noted.  NEUROLOGIC: DONALD. No tremor noted  SKIN: warm and dry;    IMPRESSION:  DM w/ neuro/renal, uncontrolled, but improved, requiring ac and hs testing to avoid hypoglycemia' w/ goal to improve   Uncontrolled BS  Hypoglycemia , DM on insulin, occ    Proteinuria in DM, asx  CKD, IV, slight improvement  HTN, stable  HLD, asx, not @ goal   OA, multiple joints-Abnormal gai  Migraine HA, exacerbated  Hypothyroidism, asx  Urinary retention w/ permanent SPT  Osteopenia, asx  MDD, on Rx  Morbid obesity, BMI 43    PLAN:  DEXA  Mammo  C-scpine  D/C pravastatin  Rx rosuvastatin 20mg  Vigorous hydration  Monitor BP and BS  Continue present meds  Pt requesting Fioricet, think better addressed w/ Neuro   - will route to Dr. Mica cotto   RTC 6 mos    45' o/v > 50% face time spent in review, rec, and MDM

## 2019-01-21 ENCOUNTER — OFFICE VISIT (OUTPATIENT)
Dept: INTERNAL MEDICINE | Facility: CLINIC | Age: 69
End: 2019-01-21
Payer: MEDICARE

## 2019-01-21 ENCOUNTER — HOSPITAL ENCOUNTER (OUTPATIENT)
Dept: RADIOLOGY | Facility: HOSPITAL | Age: 69
Discharge: HOME | End: 2019-01-21
Attending: INTERNAL MEDICINE
Payer: MEDICARE

## 2019-01-21 VITALS
SYSTOLIC BLOOD PRESSURE: 118 MMHG | BODY MASS INDEX: 43.33 KG/M2 | TEMPERATURE: 99 F | OXYGEN SATURATION: 97 % | RESPIRATION RATE: 18 BRPM | HEIGHT: 68 IN | DIASTOLIC BLOOD PRESSURE: 64 MMHG | HEART RATE: 107 BPM

## 2019-01-21 DIAGNOSIS — Z12.31 ENCOUNTER FOR SCREENING MAMMOGRAM FOR BREAST CANCER: ICD-10-CM

## 2019-01-21 DIAGNOSIS — E66.01 MORBID OBESITY DUE TO EXCESS CALORIES: ICD-10-CM

## 2019-01-21 DIAGNOSIS — N25.81 SECONDARY HYPERPARATHYROIDISM, RENAL: ICD-10-CM

## 2019-01-21 DIAGNOSIS — M54.2 CHRONIC NECK AND BACK PAIN: ICD-10-CM

## 2019-01-21 DIAGNOSIS — Z78.0 POSTMENOPAUSAL ESTROGEN DEFICIENCY: ICD-10-CM

## 2019-01-21 DIAGNOSIS — G89.29 CHRONIC NECK AND BACK PAIN: ICD-10-CM

## 2019-01-21 DIAGNOSIS — I10 ESSENTIAL HYPERTENSION: ICD-10-CM

## 2019-01-21 DIAGNOSIS — F33.1 MAJOR DEPRESSIVE DISORDER, RECURRENT EPISODE, MODERATE: ICD-10-CM

## 2019-01-21 DIAGNOSIS — N18.4 CKD (CHRONIC KIDNEY DISEASE) STAGE 4, GFR 15-29 ML/MIN: ICD-10-CM

## 2019-01-21 DIAGNOSIS — E03.9 ACQUIRED HYPOTHYROIDISM: ICD-10-CM

## 2019-01-21 DIAGNOSIS — E78.2 MIXED HYPERLIPIDEMIA: ICD-10-CM

## 2019-01-21 DIAGNOSIS — M54.9 CHRONIC NECK AND BACK PAIN: ICD-10-CM

## 2019-01-21 PROCEDURE — 99999 PR PBB SHADOW E&M-EST. PATIENT-LVL IV: CPT | Mod: PBBFAC,,, | Performed by: INTERNAL MEDICINE

## 2019-01-21 PROCEDURE — 72050 X-RAY EXAM NECK SPINE 4/5VWS: CPT | Mod: TC,PO

## 2019-01-21 PROCEDURE — 99999 PR PBB SHADOW E&M-EST. PATIENT-LVL IV: ICD-10-PCS | Mod: PBBFAC,,, | Performed by: INTERNAL MEDICINE

## 2019-01-21 PROCEDURE — 72050 XR CERVICAL SPINE COMPLETE 5 VIEW: ICD-10-PCS | Mod: 26,,, | Performed by: RADIOLOGY

## 2019-01-21 PROCEDURE — 99215 PR OFFICE/OUTPT VISIT, EST, LEVL V, 40-54 MIN: ICD-10-PCS | Mod: S$PBB,,, | Performed by: INTERNAL MEDICINE

## 2019-01-21 PROCEDURE — 99214 OFFICE O/P EST MOD 30 MIN: CPT | Mod: PBBFAC,25,PO | Performed by: INTERNAL MEDICINE

## 2019-01-21 PROCEDURE — 72050 X-RAY EXAM NECK SPINE 4/5VWS: CPT | Mod: 26,,, | Performed by: RADIOLOGY

## 2019-01-21 PROCEDURE — 99215 OFFICE O/P EST HI 40 MIN: CPT | Mod: S$PBB,,, | Performed by: INTERNAL MEDICINE

## 2019-01-21 RX ORDER — TRAZODONE HYDROCHLORIDE 100 MG/1
TABLET ORAL
Qty: 45 TABLET | Refills: 12 | Status: SHIPPED | OUTPATIENT
Start: 2019-01-21 | End: 2020-01-17 | Stop reason: SDUPTHER

## 2019-01-21 RX ORDER — ROSUVASTATIN CALCIUM 20 MG/1
20 TABLET, COATED ORAL DAILY
Qty: 90 TABLET | Refills: 1 | Status: SHIPPED | OUTPATIENT
Start: 2019-01-21 | End: 2019-03-18 | Stop reason: SDUPTHER

## 2019-01-21 NOTE — Clinical Note
Christopher Nunez, saw our mutual pt today and she requested Rx Fioricet; advised her I would review w/ you as unclear if this is a reasonable adjunct with all her other HA meds.Appreciate your assist.Lurdes Pimentel

## 2019-01-22 ENCOUNTER — LAB VISIT (OUTPATIENT)
Dept: LAB | Facility: HOSPITAL | Age: 69
End: 2019-01-22
Attending: INTERNAL MEDICINE
Payer: MEDICARE

## 2019-01-22 DIAGNOSIS — N18.4 CKD (CHRONIC KIDNEY DISEASE) STAGE 4, GFR 15-29 ML/MIN: ICD-10-CM

## 2019-01-22 LAB
BACTERIA #/AREA URNS AUTO: ABNORMAL /HPF
BILIRUB UR QL STRIP: ABNORMAL
CLARITY UR REFRACT.AUTO: ABNORMAL
COLOR UR AUTO: YELLOW
GLUCOSE UR QL STRIP: NEGATIVE
HGB UR QL STRIP: ABNORMAL
HYALINE CASTS UR QL AUTO: 0 /LPF
KETONES UR QL STRIP: NEGATIVE
LEUKOCYTE ESTERASE UR QL STRIP: ABNORMAL
MICROSCOPIC COMMENT: ABNORMAL
NITRITE UR QL STRIP: NEGATIVE
PH UR STRIP: 6 [PH] (ref 5–8)
PROT UR QL STRIP: ABNORMAL
RBC #/AREA URNS AUTO: 8 /HPF (ref 0–4)
SP GR UR STRIP: 1.02 (ref 1–1.03)
URN SPEC COLLECT METH UR: ABNORMAL
WBC #/AREA URNS AUTO: >100 /HPF (ref 0–5)
WBC CLUMPS UR QL AUTO: ABNORMAL

## 2019-01-22 PROCEDURE — 81001 URINALYSIS AUTO W/SCOPE: CPT

## 2019-01-23 ENCOUNTER — TELEPHONE (OUTPATIENT)
Dept: INTERNAL MEDICINE | Facility: CLINIC | Age: 69
End: 2019-01-23

## 2019-01-23 DIAGNOSIS — M47.812 OSTEOARTHRITIS OF CERVICAL SPINE, UNSPECIFIED SPINAL OSTEOARTHRITIS COMPLICATION STATUS: Primary | ICD-10-CM

## 2019-01-23 RX ORDER — BUTALBITAL, ACETAMINOPHEN AND CAFFEINE 50; 325; 40 MG/1; MG/1; MG/1
TABLET ORAL
Qty: 10 TABLET | Refills: 0 | Status: SHIPPED | OUTPATIENT
Start: 2019-01-23 | End: 2019-02-26 | Stop reason: SDUPTHER

## 2019-01-23 NOTE — TELEPHONE ENCOUNTER
Please advise pt that her neck xray revealed DJD  Is she interested in PT??  Dr. Henao did okay # 10 Fioricet /month and will send to pharmacy

## 2019-01-23 NOTE — TELEPHONE ENCOUNTER
Rx called in to pharmacy for fioricet, #10 with 0 refills.  Spoke to pt. Pt informed of results.  Pt ok with trying one visit with PT to see if they can help.  Please enter order.

## 2019-01-28 ENCOUNTER — PATIENT MESSAGE (OUTPATIENT)
Dept: ENDOCRINOLOGY | Facility: CLINIC | Age: 69
End: 2019-01-28

## 2019-01-30 ENCOUNTER — OFFICE VISIT (OUTPATIENT)
Dept: NEPHROLOGY | Facility: CLINIC | Age: 69
End: 2019-01-30
Payer: MEDICARE

## 2019-01-30 ENCOUNTER — TELEPHONE (OUTPATIENT)
Dept: NEPHROLOGY | Facility: CLINIC | Age: 69
End: 2019-01-30

## 2019-01-30 VITALS
HEART RATE: 93 BPM | DIASTOLIC BLOOD PRESSURE: 90 MMHG | BODY MASS INDEX: 43.5 KG/M2 | SYSTOLIC BLOOD PRESSURE: 148 MMHG | HEIGHT: 68 IN | OXYGEN SATURATION: 96 % | WEIGHT: 287 LBS

## 2019-01-30 DIAGNOSIS — N39.0 RECURRENT UTI: ICD-10-CM

## 2019-01-30 DIAGNOSIS — G47.33 OSA (OBSTRUCTIVE SLEEP APNEA): ICD-10-CM

## 2019-01-30 DIAGNOSIS — N31.9 NEUROGENIC BLADDER: ICD-10-CM

## 2019-01-30 DIAGNOSIS — M79.7 FIBROMYALGIA: ICD-10-CM

## 2019-01-30 DIAGNOSIS — E66.01 MORBID OBESITY DUE TO EXCESS CALORIES: ICD-10-CM

## 2019-01-30 DIAGNOSIS — E87.20 METABOLIC ACIDOSIS: ICD-10-CM

## 2019-01-30 DIAGNOSIS — N13.30 HYDRONEPHROSIS, UNSPECIFIED HYDRONEPHROSIS TYPE: ICD-10-CM

## 2019-01-30 DIAGNOSIS — D50.8 OTHER IRON DEFICIENCY ANEMIA: ICD-10-CM

## 2019-01-30 DIAGNOSIS — R33.9 URINARY RETENTION: ICD-10-CM

## 2019-01-30 DIAGNOSIS — I10 ESSENTIAL HYPERTENSION: ICD-10-CM

## 2019-01-30 DIAGNOSIS — E03.9 ACQUIRED HYPOTHYROIDISM: ICD-10-CM

## 2019-01-30 DIAGNOSIS — N18.4 CKD (CHRONIC KIDNEY DISEASE) STAGE 4, GFR 15-29 ML/MIN: Primary | ICD-10-CM

## 2019-01-30 DIAGNOSIS — E78.2 MIXED HYPERLIPIDEMIA: ICD-10-CM

## 2019-01-30 DIAGNOSIS — G89.29 OTHER CHRONIC PAIN: ICD-10-CM

## 2019-01-30 DIAGNOSIS — Z74.09 MOBILITY IMPAIRED: ICD-10-CM

## 2019-01-30 DIAGNOSIS — N25.81 SECONDARY HYPERPARATHYROIDISM, RENAL: ICD-10-CM

## 2019-01-30 DIAGNOSIS — R26.9 ABNORMALITY OF GAIT AND MOBILITY: ICD-10-CM

## 2019-01-30 PROCEDURE — 99999 PR PBB SHADOW E&M-EST. PATIENT-LVL III: ICD-10-PCS | Mod: PBBFAC,,, | Performed by: INTERNAL MEDICINE

## 2019-01-30 PROCEDURE — 99999 PR PBB SHADOW E&M-EST. PATIENT-LVL III: CPT | Mod: PBBFAC,,, | Performed by: INTERNAL MEDICINE

## 2019-01-30 PROCEDURE — 99213 OFFICE O/P EST LOW 20 MIN: CPT | Mod: PBBFAC,PO | Performed by: INTERNAL MEDICINE

## 2019-01-30 PROCEDURE — 99214 OFFICE O/P EST MOD 30 MIN: CPT | Mod: S$PBB,,, | Performed by: INTERNAL MEDICINE

## 2019-01-30 PROCEDURE — 99214 PR OFFICE/OUTPT VISIT, EST, LEVL IV, 30-39 MIN: ICD-10-PCS | Mod: S$PBB,,, | Performed by: INTERNAL MEDICINE

## 2019-01-30 RX ORDER — CLONIDINE HYDROCHLORIDE 0.1 MG/1
0.1 TABLET ORAL ONCE
Qty: 30 TABLET | Refills: 3 | Status: SHIPPED | OUTPATIENT
Start: 2019-01-30 | End: 2019-09-25

## 2019-01-31 ENCOUNTER — CLINICAL SUPPORT (OUTPATIENT)
Dept: REHABILITATION | Facility: HOSPITAL | Age: 69
End: 2019-01-31
Attending: INTERNAL MEDICINE
Payer: MEDICARE

## 2019-01-31 DIAGNOSIS — G43.809 CERVICOGENIC MIGRAINE: ICD-10-CM

## 2019-01-31 PROCEDURE — 97161 PT EVAL LOW COMPLEX 20 MIN: CPT | Mod: PO

## 2019-01-31 PROCEDURE — 97110 THERAPEUTIC EXERCISES: CPT | Mod: PO

## 2019-02-01 PROBLEM — G43.809 CERVICOGENIC MIGRAINE: Status: ACTIVE | Noted: 2019-02-01

## 2019-02-01 NOTE — PLAN OF CARE
[facility]  Physical Therapy Initial Evaluation    Name: Cecile Bowen  [facility] Number: [id number]    Therapy Diagnosis:   Encounter Diagnosis   Name Primary?    Cervicogenic migraine      Physician: Lurdes Navarro    Physician Orders: PT Eval and Treat Neck pain w/ cervicogenic headache  Medical Diagnosis from Referral: Osteoarthritis of cervical spine unspecified spinal osteoarthritis complication status  Evaluation Date: 1/31/2019  Authorization Period Expiration: 1/23/20  Plan of Care Expiration: 4/21/19  Visit # / Visits authorized: 1/ 1    Time In: 2:00  Time Out: 3:00  Total Billable Time: 60 minutes    Precautions: Standard    Subjective   Date of onset: 2013  History of current condition - Cecile reports: insidious onset of neck pain, low back pain and migraines starting a few months after Aug 2013. Pt reports in Aug 2013 she had at least 6 falls secondary to an adverse reaction to her DM medication. Pt reports having issues with her BP, she was passing out and her kidneys were shutting down. Pt reports her migraines started off occurring a couple times a month which gradually increased to now twice a day and can last for a few hours. Pt was evaluated by a neurologist who attributes her headaches sleep apnea and noncompliance with CPAP machine (pt reports claustrophobia is her reason for non compliance and admits she suffers from insomnia). Pt reports she is unaware of any mechanical stressors for her headaches however she does notice stress increases headaches. Pt reports headaches prevent her from reading (used to read 1 novel / week currently not reading) and impair her cognition for basic ADLs. Pt also reports persistent back pain which she felt was getting somewhat better after 5 PT visits however she did not book additional appointments because she was unaware insurance had approved more visits. Pt would like to be able to read/ perform ADLs without provoking  migraines and be less reliant on her scooter.    Past Medical History:   Diagnosis Date    CKD (chronic kidney disease) stage 4, GFR 15-29 ml/min     [facility]    Hypertension 12/12/2012    Hypothyroidism 12/12/2012    Levoscoliosis     Migraine     Nuclear sclerosis - Both Eyes 3/24/2014    Obesity 12/12/2012     Cecile Bowen  has a past surgical history that includes Total abdominal hysterectomy w/ bilateral salpingoophorectomy (1985); Gallbladder surgery (2006); TONSILLECTOMY, ADENOIDECTOMY; Ovarian cyst surgery (1985); Dilation and curettage of uterus; Cholecystectomy; spt placement; Breast biopsy (Right); Colonoscopy (N/A, 1/13/2017); cystoscopy (N/A, 10/8/2018); and injection of botulinum toxin type a (N/A, 10/8/2018).    Cecile has a current medication list which includes the following prescription(s): aspirin-acetaminophen-caffeine 250-250-65 mg, bd insulin syringe ultra-fine, blood sugar diagnostic, blood-glucose meter, butalbital-acetaminophen-caffeine -40 mg, clonidine, cyanocobalamin (vitamin b-12), erenumab-aooe, ergocalciferol, ferrous sulfate, gemfibrozil, hydrocodone-acetaminophen, insulin, insulin lispro, lancets, magnesium oxide, methocarbamol, multivitamin with minerals, oxybutynin, pen needle, diabetic, riboflavin (vitamin b2), rosuvastatin, scopolamine, sodium bicarbonate, sumatriptan, synthroid, topiramate, trazodone, and venlafaxine.    Review of patient's allergies indicates:  No Known Allergies     Imaging, x-ray 1/21/19:     CLINICAL HISTORY:  Cervicalgia    FINDINGS:  There is grade 1 anterolisthesis of C3 on C4.  There is moderate DJD is C5-C6 and C6-C7, mild elsewhere.  The odontoid prevertebral soft tissues and posterior elements are intact.  The neural foramina are patent.        Prior Therapy: Pt received PT for LBP for 5 visits then did not schedule f/u because she was unaware she was approved for more visits  Social History: Lives in [place] with diasabled brother and  "sister. Walk in shower with shower chair no grab bars, commode   Prior Level of Function: Pt mod I with ADLs and navigating community with scooter, walks short distances at home but limited by LBP  Current Level of Function: Pt mod I with ADLs and navigating community with scooter, walks short distances at home but limited by LBP, pt limited in cognition secondary to neck pain/ headaches    Pain:  Current 7/10, worst 8/10, best 5/10   Location: Low back and neck   Description: Aching, Dull, Burning, Sharp, Electric and Shooting  Aggravating Factors: Sitting, Standing, Bending, Night Time, Morning and Flexing  Easing Factors: pain medication and rest    Pts goals: Have no headaches, cooking own meals, getting own stuff in the kitchen, stand for long enough to get own meals     Objective     Posture:  FHP, FSP, inc upper cervical extension    Cervical Range of Motion:    % Pain   Flexion WNL Tight/P!     Extension 75% Reports dizzines which resolves upon returning to neutral     Right Rotation WNL      Left Rotation WNL      Right Side Bending WNL "tight"   Left Side Bending WNL "tight"      Shoulder Range of Motion: WFL, pt reports pain at end range abduction/extension in L shoulder  *Pt endorses she has noticed pain with L UE reaching for about 1 year    Special Tests:  Distraction (+)   Compression (+)   Spurlings (-)   Sharp-Joel (-)   VA test (-)   Lateral Flexion Alar Ligament (-)     Joint Mobility: Transverse glides  Restricted/painful throughout C3-C6,      Thoracic mobility: PA glides restricted throughout t spine    Palpation: Pt TTP to bilat cervical paraspinals, suboccipitals, levator, and scalenes L>R    Sensation: WNL    TREATMENT   Treatment Time In: 2:40  Treatment Time Out: 3:00  Total Treatment time separate from Evaluation: 20 minutes    Cecile received therapeutic exercises to develop strength, endurance, ROM, flexibility, posture and core stabilization for 15 minutes including:    Chin tuck 3 " sec holds x20 (supine/sitting)  Scapular retraction/depression 3 sec holds x20  Supine horizontal abduction w/ YTB 2x10  Supine brueggars YTB (held d/t L shoulder pain)    Cecile received the following manual therapy techniques: Manual traction and Myofacial release were applied to the: cervical spine for 5 minutes, including:    STM to L suboccipital  Cervical traction  * Pt reports absence of headache following manual    Home Exercises and Patient Education Provided    Education provided re: posture, importance of physical activity, activity modification, roll of PT, POC    Written Home Exercises Provided: All exercises performed during today's treatment were printed and given to pt.  Exercises were reviewed and Cecile was able to demonstrate them prior to the end of the session.   Pt received a written copy of exercises to perform at home. Cecile demonstrated good  understanding of the education provided.     Assessment   Cecile is a 68 y.o. female referred to outpatient Physical Therapy with a medical diagnosis of neck pain with cervicogenic headache. Pt presents with decreased strength, decreased ROM, decreased flexibility, faulty posture, and increased pain. Due to impairments, pt is unable to read books, participate in regular physical activity or tolerate ADLs.     Pt prognosis is Good.   Pt will benefit from skilled outpatient Physical Therapy to address the deficits stated above and in the chart below, provide pt/family education, and to maximize pt's level of independence.     Plan of care discussed with patient: Yes  Pt's spiritual, cultural and educational needs considered and patient is agreeable to the plan of care and goals as stated below:     Anticipated Barriers for therapy: stopped attending PT following 6 visits last bout, fear avoidance behaviors    Medical Necessity is demonstrated by the following  History  Co-morbidities and personal factors that may impact the plan of care Examination  Body  Structures and Functions, activity limitations and participation restrictions that may impact the plan of care      Clinical Presentation   Co-morbidities:   high BMI and prior lumbar surgery           Personal Factors:   no deficits Body Regions:   back     Body Systems:   ROM  strength  gait  transfers           Participation Restrictions:   Pt unable to perform housework and cooking. Pt unable to ambulate community distances.        Activity limitations:   Learning and applying knowledge  no deficits     General Tasks and Commands  no deficits     Communication  no deficits     Mobility  no deficits     Self care  no deficits     Domestic Life  cooking  doing house work (cleaning house, washing dishes, laundry)  assisting others     Interactions/Relationships  no deficits     Life Areas  no deficits     Community and Social Life  no deficits             stable and uncomplicated                                low   moderate   high Decision Making/ Complexity Score:  low      Goals:  Short Term Goals: 4 weeks   1. Pt will be independent with HEP and report compliance at least 4 days/ week  2. Pt will report participating in 20 minutes of physical activity at least 3 days/ week  3. Pt will report fewer than 5 headaches / week which last less than 1 hour    Long Term Goals: 10 weeks   1. Pt will understanding which postural endurance training exercises are to be performed on at least 5 days/ week and report no pain while performing  2. Pt will be able to read one novel / week reporting no increase in headaches  3. Pt will be able to tolerate standing in her kitchen for at least 20 minutes reporting less than 2/10 pain in order to be able to prepare a meal    Plan   Plan of care Certification: 1/31/2019 to 4/15/19.    Outpatient Physical Therapy 2 times weekly for 10 weeks to include the following interventions: Cervical/Lumbar Traction, Electrical Stimulation TENS, Manual Therapy, Moist Heat/ Ice, Neuromuscular  Re-ed, Patient Education, Self Care, Therapeutic Activites, Therapeutic Exercise, Ultrasound and Dry Needling.     Saul Schuler, PT

## 2019-02-04 DIAGNOSIS — G89.29 CHRONIC BILATERAL LOW BACK PAIN WITHOUT SCIATICA: ICD-10-CM

## 2019-02-04 DIAGNOSIS — M54.50 CHRONIC BILATERAL LOW BACK PAIN WITHOUT SCIATICA: ICD-10-CM

## 2019-02-04 RX ORDER — HYDROCODONE BITARTRATE AND ACETAMINOPHEN 10; 325 MG/1; MG/1
1 TABLET ORAL EVERY 6 HOURS PRN
Qty: 120 TABLET | Refills: 0 | Status: SHIPPED | OUTPATIENT
Start: 2019-02-04 | End: 2019-02-27 | Stop reason: SDUPTHER

## 2019-02-04 RX ORDER — VENLAFAXINE HYDROCHLORIDE 75 MG/1
150 CAPSULE, EXTENDED RELEASE ORAL DAILY
Qty: 180 CAPSULE | Refills: 1 | Status: SHIPPED | OUTPATIENT
Start: 2019-02-04 | End: 2019-09-14 | Stop reason: SDUPTHER

## 2019-02-08 ENCOUNTER — TELEPHONE (OUTPATIENT)
Dept: RHEUMATOLOGY | Facility: CLINIC | Age: 69
End: 2019-02-08

## 2019-02-08 DIAGNOSIS — G89.29 CHRONIC BILATERAL LOW BACK PAIN WITHOUT SCIATICA: ICD-10-CM

## 2019-02-08 DIAGNOSIS — M54.50 CHRONIC BILATERAL LOW BACK PAIN WITHOUT SCIATICA: ICD-10-CM

## 2019-02-08 RX ORDER — METHOCARBAMOL 750 MG/1
TABLET, FILM COATED ORAL
Qty: 180 TABLET | Refills: 0 | Status: SHIPPED | OUTPATIENT
Start: 2019-02-08 | End: 2019-02-27 | Stop reason: SDUPTHER

## 2019-02-11 NOTE — PROGRESS NOTES
Subjective:       Patient ID: Cecile Bowen is a 68 y.o. White female who presents for follow-up evaluation of Chronic Kidney Disease    HPI     She reports she is feeling OK except her Hba1c jumped up to 12.1 from 7.5.  Her mobility remains poor and she continues to use a scooter. Her HAs are at bay. No cloudy urine. LE edema is about the same. No NSAID use, uses morphine derivatives for pain. She continues to attend her [employer] class    Interval history Jan 2019: she reports she is doing well. Her insurance has improved a new migraine HA medication and she is quire relieved. She uses clonidine. about 2X week for elevated BP. Most of the time she has a HA. No change in LE edema.       Review of Systems   Constitutional: Negative for activity change, appetite change, fatigue and fever.   HENT: Negative for facial swelling.    Eyes: Negative for visual disturbance.   Respiratory: Negative for shortness of breath.    Cardiovascular: Positive for leg swelling. Negative for chest pain.   Gastrointestinal: Negative for constipation and diarrhea.   Genitourinary: Negative for difficulty urinating, dysuria and hematuria.   Musculoskeletal: Positive for arthralgias and back pain.   Neurological: Negative for weakness and headaches.   Psychiatric/Behavioral: Negative for sleep disturbance.       Objective:      Physical Exam   Constitutional: She is oriented to person, place, and time. She appears well-nourished.   HENT:   Mouth/Throat: Oropharynx is clear and moist.   Neck: No JVD present.   Cardiovascular: S1 normal and S2 normal. Exam reveals no friction rub.   Pulmonary/Chest: Breath sounds normal. She has no wheezes. She has no rales.   Abdominal: Soft.   Musculoskeletal: She exhibits edema.   Neurological: She is alert and oriented to person, place, and time.   Skin: Skin is warm and dry.   Psychiatric: She has a normal mood and affect.   Vitals reviewed.      Assessment:       1. CKD (chronic kidney disease) stage  4, GFR 15-29 ml/min    2. Metabolic acidosis    3. Essential hypertension    4. Mixed hyperlipidemia    5. Other iron deficiency anemia    6. Secondary hyperparathyroidism, renal    7. Uncontrolled type 2 diabetes mellitus with chronic kidney disease, with long-term current use of insulin    8. Acquired hypothyroidism    9. Abnormality of gait and mobility    10. Mobility impaired    11. VIJAYA (obstructive sleep apnea)    12. Fibromyalgia    13. Morbid obesity due to excess calories    14. Hydronephrosis, unspecified hydronephrosis type    15. Neurogenic bladder    16. Urinary retention    17. Recurrent UTI    18. Other chronic pain        Plan:           CKD Stage 4 with stable kidney function. Proteinuria increased--may be due to BP or ongoing morbid obesity and poorly controlled DM. If trend continues for next visit will add low dose RAAS blocker, ARB/aldactone as BP allows    Mineral and Bone Disease--PTH at goal. Continue calcitriol and D3. She is on exogenous bicarbonate for metabolic acidosis and bicarb is at goal    HTN is controlled for the most part but she does need clondiine 2-3 x week. If increases in frequency will start a maintenece ARB    UTI hx given suprapubic catheter--continue Urology follow up    DM--improved glycemic control    Morbid Obesity--consider bariatric surgery      RTC 4 months

## 2019-02-14 ENCOUNTER — TELEPHONE (OUTPATIENT)
Dept: PHARMACY | Facility: CLINIC | Age: 69
End: 2019-02-14

## 2019-02-14 ENCOUNTER — OFFICE VISIT (OUTPATIENT)
Dept: UROLOGY | Facility: CLINIC | Age: 69
End: 2019-02-14
Payer: MEDICARE

## 2019-02-14 VITALS
DIASTOLIC BLOOD PRESSURE: 62 MMHG | WEIGHT: 285 LBS | HEIGHT: 68 IN | HEART RATE: 101 BPM | SYSTOLIC BLOOD PRESSURE: 132 MMHG | BODY MASS INDEX: 43.19 KG/M2

## 2019-02-14 DIAGNOSIS — Z93.59 CHRONIC SUPRAPUBIC CATHETER: ICD-10-CM

## 2019-02-14 DIAGNOSIS — N31.9 NEUROGENIC BLADDER: Primary | ICD-10-CM

## 2019-02-14 DIAGNOSIS — Z43.5 ENCOUNTER FOR CARE OR REPLACEMENT OF SUPRAPUBIC TUBE: ICD-10-CM

## 2019-02-14 PROCEDURE — 51705 CHANGE OF BLADDER TUBE: CPT | Mod: S$PBB,,, | Performed by: NURSE PRACTITIONER

## 2019-02-14 PROCEDURE — 99213 OFFICE O/P EST LOW 20 MIN: CPT | Mod: PBBFAC | Performed by: NURSE PRACTITIONER

## 2019-02-14 PROCEDURE — 99214 OFFICE O/P EST MOD 30 MIN: CPT | Mod: S$PBB,25,, | Performed by: NURSE PRACTITIONER

## 2019-02-14 PROCEDURE — 51705 PR CHANGE OF BLADDER TUBE,SIMPLE: ICD-10-PCS | Mod: S$PBB,,, | Performed by: NURSE PRACTITIONER

## 2019-02-14 PROCEDURE — 99999 PR PBB SHADOW E&M-EST. PATIENT-LVL III: CPT | Mod: PBBFAC,,, | Performed by: NURSE PRACTITIONER

## 2019-02-14 PROCEDURE — 99214 PR OFFICE/OUTPT VISIT, EST, LEVL IV, 30-39 MIN: ICD-10-PCS | Mod: S$PBB,25,, | Performed by: NURSE PRACTITIONER

## 2019-02-14 PROCEDURE — 51700 IRRIGATION OF BLADDER: CPT | Mod: PBBFAC | Performed by: NURSE PRACTITIONER

## 2019-02-14 PROCEDURE — 51705 CHANGE OF BLADDER TUBE: CPT | Mod: PBBFAC | Performed by: NURSE PRACTITIONER

## 2019-02-14 PROCEDURE — 99999 PR PBB SHADOW E&M-EST. PATIENT-LVL III: ICD-10-PCS | Mod: PBBFAC,,, | Performed by: NURSE PRACTITIONER

## 2019-02-14 NOTE — TELEPHONE ENCOUNTER
Initial Aimovig consult completed on . Aimovig 140mg was picked up at the consult. $0 copay. Address confirmed. Confirmed 2 patient identifiers - name and . Therapy Appropriate.    --Injection experience: insulin    Counseled patient on administration directions:  - Inject 140 mg (two auto-injectors) into the skin every 28 days.   - Take out of the refrigerator 30-60 minutes prior to injection.  - Wash hands before and after injection.  - Monthly RX will come with gauze, bandaids, and alcohol swabs.  - Patient may inject in either the tops of the thighs, abdomen- but at least 2 inches away from belly button, or the outer part of her upper arm (with assistance). If injecting 2 pens, use 2 different injection sites.   - Patient is to wipe down the injection site with the alcohol pad, wait to dry.    - Remove white cap from pen  - Gently squeeze OR stretch the area of the cleaned skin and hold it firmly.  Place the pen flat against the skin then push down on the purple button and release - there will be an initial click; in 10-15 seconds you will hear a second click and the window will go from from clear to yellow, indicating injection is complete.  - Patient should rotate injection sites.   - Patient will use sharps container; once full, per [place] law, she/ he may lock the sharps container and place in trash. Pharmacy will replace the sharps at no additional charge.    Patient was counseled on possible side effects:  - Injection site reaction: redness, soreness, itching, bruising, which should resolve within 3-5 days.  - constipation  - muscle cramping/spasms    Consultation included: indication; goals of treatment (may take 3 months to reach full efficacy); administration; storage and handling; side effects; how to handle side effects; the importance of compliance; the importance of keeping all follow up appointments.  Patient understands to report any medication changes to [facility] and provider. All questions  answered and addressed to patients satisfaction. [facility] to contact patient in 3 weeks for refills.     Ayan Webber, PharmD  Clinical Pharmacist   [facility]   P: [phone]

## 2019-02-14 NOTE — PROGRESS NOTES
Subjective:       Patient ID: Cecile Bowen is a 68 y.o. female.    Chief Complaint: Neurogenic Bladder (Chronic SPT)    Cecile Bowen is a 68 y.o. Female with history of bilateral hydronephrosis, incomplete bladder emptying which is managed by SPT (16fr).  S/p Cystoscopy with bladder botox injection (300u) 09/17/2018 with Dr. Whittington.    Her last SPT change was 01/09/2019.    Here today for scheduled SPT change.  Bladder spasm are greatly reduced; pleased with botox.    No fever, n/v              Past Medical History:    Diabetes type 2, uncontrolled                   12/12/2012    Obesity                                         12/12/2012    Hypertension                                    12/12/2012    DJD (degenerative joint disease)                12/12/2012    Hypothyroidism                                  12/12/2012    Nuclear sclerosis - Both Eyes                   3/24/2014     Migraine                                                      Past Surgical History:    TOTAL ABDOMINAL HYSTERECTOMY W/ BILATERAL SALP*  1985          GALLBLADDER SURGERY                              2006          TONSILLECTOMY, ADENOIDECTOMY                                   OVARIAN CYST SURGERY                             1985          HYSTERECTOMY                                                   DILATION AND CURETTAGE OF UTERUS                               Review of patient's family history indicates:    Diabetes                       Sister                    Kidney disease                 Sister                    ALS                            Mother                      Comment: d.    Cancer                         Maternal Grandmother        Comment: d. colon    Cancer                         Paternal Grandfather        Comment: d. lung    Diabetes                       Maternal Aunt             Kidney disease                 Maternal Aunt             Diabetes                       Maternal Uncle            Amblyopia  "                     Neg Hx                    Blindness                      Neg Hx                    Cataracts                      Neg Hx                    Glaucoma                       Neg Hx                    Macular degeneration           Neg Hx                    Retinal detachment             Neg Hx                    Strabismus                     Neg Hx                      Social History    Marital Status:             Spouse Name:                       Years of Education:                 Number of children:               Occupational History    None on file    Social History Main Topics    Smoking Status: Never Smoker                      Smokeless Status: Never Used                        Alcohol Use: No              Drug Use: No              Sexual Activity: Not Currently           Birth Control/Protection: Abstinence    Other Topics            Concern    None on file    Social History Narrative    Single      Lives w/ sister  And brother     both are helping her during her post hosp recovery period        Allergies:  Review of patient's allergies indicates no known allergies.    Medications:  Current outpatient prescriptions:amitriptyline (ELAVIL) 10 MG tablet, TAKE 3 TABLETS BY MOUTH IN THE EVENING, Disp: 90 tablet, Rfl: 5;  aspirin (ECOTRIN) 81 MG EC tablet, Take 81 mg by mouth once daily., Disp: , Rfl: ;  BD INSULIN PEN NEEDLE UF SHORT 31 X 5/16 " Ndle, USE ONCE DAILY WITH LANTUS SOLOSTAR PEN INSULIN, Disp: 100 each, Rfl: 11  butalbital-acetaminophen-caffeine -40 mg (FIORICET, ESGIC) -40 mg per tablet, TAKE 1 TABLET EVERY 4 TO 6 HOURS, Disp: 30 tablet, Rfl: 0;  cyanocobalamin, vitamin B-12, (VITAMIN B-12) 2,500 mcg Subl, Place 2,500 mcg under the tongue once daily., Disp: , Rfl: ;  diphenhydrAMINE (BENADRYL) 25 mg capsule, Take 25 mg by mouth every 6 (six) hours as needed., Disp: , Rfl:   ferrous sulfate 325 (65 FE) MG EC tablet, Take 325 mg by mouth 3 (three) times daily " "with meals., Disp: , Rfl: ;  fluticasone (FLONASE) 50 mcg/actuation nasal spray, 1 SPRAY IN EACH NOSTRIL DAILY (Patient taking differently: 1 SPRAY IN EACH NOSTRIL DAILY PRN), Disp: 16 g, Rfl: 1;  furosemide (LASIX) 20 MG tablet, Take 1 tablet (20 mg total) by mouth once daily., Disp: 4 tablet, Rfl: 0  gemfibrozil (LOPID) 600 MG tablet, TAKE 1 TABLET BY MOUTH TWICE A DAY, Disp: 60 tablet, Rfl: 5;  (START ON 4/29/2015) hydrocodone-acetaminophen 10-325mg (NORCO)  mg Tab, Take 1 tablet by mouth every 6 (six) hours as needed., Disp: 120 tablet, Rfl: 0;  insulin lispro (HUMALOG) 100 unit/mL injection, Inject 7 Units into the skin 3 (three) times daily before meals., Disp: 6.3 mL, Rfl: 11  insulin syringe-needle U-100 (BD INSULIN SYRINGE ULTRA-FINE) 1/2 mL 31 x 15/64" Syrg, 1 Box by Misc.(Non-Drug; Combo Route) route 4 (four) times daily., Disp: 100 Syringe, Rfl: 12;  lancets (ONE TOUCH DELICA LANCETS) Misc, Ultra 2 lancets to check blood sugar BID, Disp: 100 each, Rfl: 6;  LANTUS SOLOSTAR 100 unit/mL (3 mL) InPn pen, , Disp: , Rfl: 3  LIDODERM 5 %(700 mg/patch), APPLY 1 PATCH TO SKIN DAILY (LEAVING ON FOR 12 HOURS AND OFF FOR 12 HOURS), Disp: 30 patch, Rfl: 6;  magnesium oxide (MAG-OX) 400 mg tablet, TAKE 1 TABLET (400 MG TOTAL) BY MOUTH 2 (TWO) TIMES DAILY., Disp: 60 tablet, Rfl: 11;  methocarbamol (ROBAXIN) 750 MG Tab, TAKE 1 TO 2 TABLETS BY MOUTH 3 TIMES A DAY (Patient taking differently: Take 2 tablets twice daily), Disp: 120 tablet, Rfl: 3  methocarbamol (ROBAXIN) 750 MG Tab, TAKE 1 TO 2 TABLETS BY MOUTH 3 TIMES A DAY, Disp: 120 tablet, Rfl: 3;  methylPREDNISolone (MEDROL DOSEPACK) 4 mg tablet, use as directed, Disp: 21 tablet, Rfl: 0;  multivitamin with minerals tablet, Take 1 tablet by mouth once daily., Disp: , Rfl: ;  pravastatin (PRAVACHOL) 40 MG tablet, TAKE 1 TABLET BY MOUTH EVERY DAY, Disp: 30 tablet, Rfl: 2  pyridoxine (B-6) 100 MG Tab, Take 1 tablet (100 mg total) by mouth once daily., Disp: 30 " tablet, Rfl: 12;  scopolamine (TRANSDERM-SCOP) 1.5 mg, Place 1 patch (1.5 mg total) onto the skin every 72 hours., Disp: 4 patch, Rfl: 0;  sulfamethoxazole-trimethoprim 800-160mg (BACTRIM DS) 800-160 mg Tab, Take 1 tablet by mouth 2 (two) times daily., Disp: , Rfl: 0  sumatriptan (IMITREX) 100 MG tablet, TAKE 1 TABLET (100 MG TOTAL) BY MOUTH ONCE., Disp: 9 tablet, Rfl: 6;  SYNTHROID 125 mcg tablet, TAKE 1 TABLET BY MOUTH DAILY, Disp: 90 tablet, Rfl: 4;  topiramate (TOPAMAX) 100 MG tablet, TAKE 1 TABLET (100 MG TOTAL) BY MOUTH 2 (TWO) TIMES DAILY., Disp: 60 tablet, Rfl: 11;  topiramate (TOPAMAX) 25 MG tablet, Take 4 tablets (100 mg total) by mouth 2 (two) times daily., Disp: 240 tablet, Rfl: 5  venlafaxine (EFFEXOR-XR) 150 MG Cp24, TAKE 1 CAPSULE BY MOUTH ONCE DAILY, Disp: 30 capsule, Rfl: 2;  vitamin D (VITAMIN D3) 185 MG Tab, Take 185 mg by mouth once daily., Disp: , Rfl:           Review of Systems   Constitutional: Negative for chills and fever.   HENT: Negative for facial swelling and trouble swallowing.    Eyes: Negative for visual disturbance.   Respiratory: Negative for cough, chest tightness, shortness of breath and wheezing.    Cardiovascular: Negative for chest pain and palpitations.   Gastrointestinal: Negative for abdominal pain, constipation, nausea and vomiting.   Genitourinary: Positive for difficulty urinating. Negative for dysuria, hematuria, urgency and vaginal bleeding.   Musculoskeletal: Positive for arthralgias and gait problem.   Skin: Negative for rash.   Neurological: Positive for weakness. Negative for dizziness and headaches.   Hematological: Does not bruise/bleed easily.   Psychiatric/Behavioral: Negative for behavioral problems.       Objective:      Physical Exam   Nursing note and vitals reviewed.  Constitutional: She is oriented to person, place, and time. She appears well-developed and well-nourished.   HENT:   Head: Normocephalic.   Right Ear: External ear normal.   Left Ear:  External ear normal.   Nose: Nose normal.   Eyes: Conjunctivae and lids are normal. Right eye exhibits no discharge. Left eye exhibits no discharge. No scleral icterus.   Neck: Trachea normal and normal range of motion. Neck supple. No tracheal deviation present.   Cardiovascular: Normal rate, regular rhythm and intact distal pulses.    Pulmonary/Chest: Effort normal. No respiratory distress.   Abdominal: Soft. Bowel sounds are normal. She exhibits no distension and no mass. There is no tenderness. There is no rebound and no guarding.       Musculoskeletal: Normal range of motion. She exhibits no edema.   Neurological: She is alert and oriented to person, place, and time.   Skin: Skin is warm, dry and intact.     Psychiatric: She has a normal mood and affect. Her behavior is normal. Judgment and thought content normal.       Assessment:       1. Neurogenic bladder    2. Chronic suprapubic catheter    3. Encounter for care or replacement of suprapubic tube        Plan:         I spent 30 minutes with the patient of which more than half was spent in direct consultation with the patient in regards to our treatment and plan.    Education and recommendations of today's plan of care including home remedies.  Spoke with Ms. Jessi about allowing our new NP Nithin Velazquez to exchange her SPT; she gave verbal consent.  Old SPT easily removed.   Stoma prepped with betadine.   New 16fr SPT placed; urine received but difficulty with balloon inflation and and flushed.  Some hematuria noted.  I repositioned SPT; was able to inflate and flush.  Irrigated the bladder.  Discussed granulation tissue is friable; next visit will have Silver nitrate sticks to tend to that.  Voiced understanding

## 2019-02-26 RX ORDER — BUTALBITAL, ACETAMINOPHEN AND CAFFEINE 50; 325; 40 MG/1; MG/1; MG/1
TABLET ORAL
Qty: 10 TABLET | Refills: 0 | Status: SHIPPED | OUTPATIENT
Start: 2019-02-26 | End: 2019-03-28 | Stop reason: SDUPTHER

## 2019-02-27 ENCOUNTER — INITIAL CONSULT (OUTPATIENT)
Dept: RHEUMATOLOGY | Facility: CLINIC | Age: 69
End: 2019-02-27
Payer: MEDICARE

## 2019-02-27 ENCOUNTER — OFFICE VISIT (OUTPATIENT)
Dept: PHYSICAL MEDICINE AND REHAB | Facility: CLINIC | Age: 69
End: 2019-02-27
Payer: MEDICARE

## 2019-02-27 VITALS
HEIGHT: 68 IN | WEIGHT: 286 LBS | SYSTOLIC BLOOD PRESSURE: 151 MMHG | BODY MASS INDEX: 43.35 KG/M2 | DIASTOLIC BLOOD PRESSURE: 95 MMHG | WEIGHT: 285 LBS | BODY MASS INDEX: 43.19 KG/M2 | SYSTOLIC BLOOD PRESSURE: 166 MMHG | DIASTOLIC BLOOD PRESSURE: 91 MMHG | HEART RATE: 82 BPM | HEIGHT: 68 IN | HEART RATE: 63 BPM

## 2019-02-27 DIAGNOSIS — G89.29 CHRONIC NECK PAIN: ICD-10-CM

## 2019-02-27 DIAGNOSIS — M15.9 PRIMARY OSTEOARTHRITIS INVOLVING MULTIPLE JOINTS: Primary | ICD-10-CM

## 2019-02-27 DIAGNOSIS — M54.50 CHRONIC BILATERAL LOW BACK PAIN WITHOUT SCIATICA: ICD-10-CM

## 2019-02-27 DIAGNOSIS — G56.03 BILATERAL CARPAL TUNNEL SYNDROME: ICD-10-CM

## 2019-02-27 DIAGNOSIS — G89.29 CHRONIC MIDLINE LOW BACK PAIN WITHOUT SCIATICA: Primary | ICD-10-CM

## 2019-02-27 DIAGNOSIS — Z74.09 MOBILITY IMPAIRED: ICD-10-CM

## 2019-02-27 DIAGNOSIS — N18.4 CKD (CHRONIC KIDNEY DISEASE) STAGE 4, GFR 15-29 ML/MIN: ICD-10-CM

## 2019-02-27 DIAGNOSIS — E66.01 MORBID OBESITY WITH BMI OF 40.0-44.9, ADULT: ICD-10-CM

## 2019-02-27 DIAGNOSIS — M79.7 FIBROMYALGIA: ICD-10-CM

## 2019-02-27 DIAGNOSIS — M47.812 SPONDYLOSIS OF CERVICAL REGION WITHOUT MYELOPATHY OR RADICULOPATHY: ICD-10-CM

## 2019-02-27 DIAGNOSIS — G89.29 CHRONIC BILATERAL LOW BACK PAIN WITHOUT SCIATICA: ICD-10-CM

## 2019-02-27 DIAGNOSIS — M54.50 CHRONIC MIDLINE LOW BACK PAIN WITHOUT SCIATICA: Primary | ICD-10-CM

## 2019-02-27 DIAGNOSIS — Z79.891 LONG TERM PRESCRIPTION OPIATE USE: ICD-10-CM

## 2019-02-27 DIAGNOSIS — G89.29 CHRONIC MIDLINE THORACIC BACK PAIN: ICD-10-CM

## 2019-02-27 DIAGNOSIS — M54.6 CHRONIC MIDLINE THORACIC BACK PAIN: ICD-10-CM

## 2019-02-27 DIAGNOSIS — Z79.891 CHRONIC USE OF OPIATE FOR THERAPEUTIC PURPOSE: ICD-10-CM

## 2019-02-27 DIAGNOSIS — M47.816 SPONDYLOSIS OF LUMBAR REGION WITHOUT MYELOPATHY OR RADICULOPATHY: ICD-10-CM

## 2019-02-27 DIAGNOSIS — M54.2 CHRONIC NECK PAIN: ICD-10-CM

## 2019-02-27 PROCEDURE — 99205 OFFICE O/P NEW HI 60 MIN: CPT | Mod: S$PBB,,, | Performed by: PHYSICAL MEDICINE & REHABILITATION

## 2019-02-27 PROCEDURE — 99205 PR OFFICE/OUTPT VISIT, NEW, LEVL V, 60-74 MIN: ICD-10-PCS | Mod: S$PBB,,, | Performed by: PHYSICAL MEDICINE & REHABILITATION

## 2019-02-27 PROCEDURE — 99999 PR PBB SHADOW E&M-EST. PATIENT-LVL III: ICD-10-PCS | Mod: PBBFAC,,, | Performed by: PHYSICAL MEDICINE & REHABILITATION

## 2019-02-27 PROCEDURE — 99999 PR PBB SHADOW E&M-EST. PATIENT-LVL III: CPT | Mod: PBBFAC,,, | Performed by: PHYSICAL MEDICINE & REHABILITATION

## 2019-02-27 PROCEDURE — 99214 PR OFFICE/OUTPT VISIT, EST, LEVL IV, 30-39 MIN: ICD-10-PCS | Mod: S$PBB,GC,, | Performed by: INTERNAL MEDICINE

## 2019-02-27 PROCEDURE — 99213 OFFICE O/P EST LOW 20 MIN: CPT | Mod: PBBFAC,27 | Performed by: PHYSICAL MEDICINE & REHABILITATION

## 2019-02-27 PROCEDURE — 99999 PR PBB SHADOW E&M-EST. PATIENT-LVL III: ICD-10-PCS | Mod: PBBFAC,GC,, | Performed by: INTERNAL MEDICINE

## 2019-02-27 PROCEDURE — 99214 OFFICE O/P EST MOD 30 MIN: CPT | Mod: S$PBB,GC,, | Performed by: INTERNAL MEDICINE

## 2019-02-27 PROCEDURE — 99213 OFFICE O/P EST LOW 20 MIN: CPT | Mod: PBBFAC | Performed by: INTERNAL MEDICINE

## 2019-02-27 PROCEDURE — 99999 PR PBB SHADOW E&M-EST. PATIENT-LVL III: CPT | Mod: PBBFAC,GC,, | Performed by: INTERNAL MEDICINE

## 2019-02-27 RX ORDER — METHOCARBAMOL 750 MG/1
750-1500 TABLET, FILM COATED ORAL 3 TIMES DAILY PRN
Qty: 180 TABLET | Refills: 3 | Status: SHIPPED | OUTPATIENT
Start: 2019-02-27 | End: 2019-05-01 | Stop reason: SDUPTHER

## 2019-02-27 RX ORDER — HYDROCODONE BITARTRATE AND ACETAMINOPHEN 10; 325 MG/1; MG/1
1 TABLET ORAL EVERY 6 HOURS PRN
Qty: 120 TABLET | Refills: 0 | Status: SHIPPED | OUTPATIENT
Start: 2019-02-27 | End: 2019-04-02 | Stop reason: SDUPTHER

## 2019-02-27 RX ORDER — HYDROCODONE BITARTRATE AND ACETAMINOPHEN 10; 325 MG/1; MG/1
1 TABLET ORAL EVERY 6 HOURS PRN
Qty: 120 TABLET | Refills: 0 | Status: CANCELLED | OUTPATIENT
Start: 2019-02-27 | End: 2019-03-29

## 2019-02-27 ASSESSMENT — ROUTINE ASSESSMENT OF PATIENT INDEX DATA (RAPID3)
AM STIFFNESS SCORE: 1, YES
PAIN SCORE: 7
FATIGUE SCORE: 5
WHEN YOU AWAKENED IN THE MORNING OVER THE LAST WEEK, PLEASE INDICATE THE AMOUNT OF TIME IT TAKES UNTIL YOU ARE AS LIMBER AS YOU WILL BE FOR THE DAY: 2HRS
PSYCHOLOGICAL DISTRESS SCORE: 3.3
MDHAQ FUNCTION SCORE: 1.5

## 2019-02-27 NOTE — PROGRESS NOTES
Subjective:       Patient ID: Cecile Bowen is a 68 y.o. female.    Chief Complaint: follow up osteoarthitis    HPI Pt is a 68-year-old female with PMH of CKD, Levoscoliosis with degenerative disc disease, hypertension, hypothyroidism, migraine and obesity who is here today for follow up for her chronic pain due to osteoarthritis and fibromyalgia.  Pt asking for refills of her Norco and robaxin.     She has peripheral neuropathy and neurogenic bladder.  She has had several further Botox injections in the bladder previously.   Pt admits to chronic pain her knees, hips and spine for close to 20 years.    She has also had some problems with her back.  She was previously seen by pain management and had trigger point injections which she stated did not help much.  She remains on methocarbamol, Effexor, trazodone, and Topamax.  Pt also takes Norco as well.  Pt has tried different muscle relaxers in past including flexeril and tizadine but she stated the methocarbamol is the only one that helps her.    Pt has been in a motorized scooter for the past 3 years due to having pain with walking in her back.    Pt admits that intermittently her fingers will get stiff.     No joint swelling, dry mouth, raynauds, weight loss, fever, hair loss, sob, chest pain, cough, oral or nasal ulcerations, n/v/d.    Pt is a retired , never smoker, no drinking, no drug.  Pts mother and uncle both had ALS, but no rheumatoid, lupus or psoriasis in the family.    Review of Systems   Constitutional: Positive for fatigue. Negative for chills, diaphoresis, fever and unexpected weight change.   HENT: Negative for congestion, mouth sores and trouble swallowing.    Eyes: Negative for pain, redness and visual disturbance.   Respiratory: Negative for shortness of breath.    Cardiovascular: Negative for chest pain.   Gastrointestinal: Negative for abdominal pain, diarrhea, nausea and vomiting.   Genitourinary: Negative for difficulty  "urinating, dysuria, genital sores and hematuria.   Musculoskeletal: Positive for arthralgias, back pain and neck pain. Negative for joint swelling.   Skin: Negative for color change and rash.   Neurological: Positive for headaches.         Objective:   BP (!) 151/91   Pulse 63   Ht 5' 8" (1.727 m)   Wt 129.3 kg (285 lb)   BMI 43.33 kg/m²      Physical Exam   Constitutional: She is oriented to person, place, and time and well-developed, well-nourished, and in no distress. No distress.   HENT:   Head: Normocephalic and atraumatic.   Right Ear: External ear normal.   Left Ear: External ear normal.   Mouth/Throat: Oropharynx is clear and moist.   Eyes: Conjunctivae are normal. Right eye exhibits no discharge. Left eye exhibits no discharge. No scleral icterus.   Neck: Neck supple.   Mild restriction with extension of c-spine   Cardiovascular: Normal rate, regular rhythm and normal heart sounds.    No murmur heard.  Pulmonary/Chest: Effort normal and breath sounds normal. No respiratory distress. She has no wheezes. She has no rales.   Abdominal: Soft. Bowel sounds are normal. She exhibits no distension. There is no tenderness. There is no rebound.   Neurological: She is alert and oriented to person, place, and time.   Skin: Skin is warm and dry. No rash noted. She is not diaphoretic. No erythema.     Psychiatric: Mood and affect normal.          Ref. Range 6/11/2010 10:40 6/30/2010 13:50 12/29/2014 14:46   JASON Latest Ref Range: Neg <1:160  Negative     JASON Screen Latest Ref Range: Negative <1:160    Negative <1:160   Cytoplasmic Neutrophilic Ab Unknown  Negative    Perinuclear (P-ANCA) Unknown  Negative    Protein, Serum Latest Ref Range: 6.0 - 8.4 g/dL   6.7   Albumin grams/dl Latest Ref Range: 3.35 - 5.55 g/dL   3.34 (L)   Alpha-1 grams/dl Latest Ref Range: 0.17 - 0.41 g/dL   0.37   Alpha-2 grams/dl Latest Ref Range: 0.43 - 0.99 g/dL   0.96   Beta grams/dl Latest Ref Range: 0.50 - 1.10 g/dL   0.82   Gamma grams/dl " Latest Ref Range: 0.67 - 1.58 g/dL   1.22   Rheumatoid Factor Latest Ref Range: 0.0 - 15.0 IU/mL   <10.0     Assessment:       1. Primary osteoarthritis involving multiple joints    2. Fibromyalgia    3. Chronic bilateral low back pain without sciatica    4. Long term prescription opiate use    5. Mobility impaired        Pt is a 68-year-old female with PMH of CKD, Levoscoliosis with degenerative disc disease, hypertension, hypothyroidism, migraine and obesity who is here today for follow up for her chronic pain due to osteoarthritis and fibromyalgia.  Pt asking for refills of her Norco and robaxin. Discussion had with patient about trying to reduce her Norco dose and refer her to PMR or pain management for further management of her chronic pain medications as we informed her we do not prescribe long term narcotics or methacarbamol however pt did not want to discuss these options.     Plan:       -referral to PMR, pt has appointment today to see PMR for pain management, long term opiate use.  -consider updating hip and knee xrays in the future.  -return to clinic PRN

## 2019-02-27 NOTE — PROGRESS NOTES
Subjective:       Patient ID: Cecile Bowen is a 68 y.o. female.    Chief Complaint: No chief complaint on file.    HPI     DATE OF CONSULTATION:  02/27/2019.    HISTORY OF PRESENT ILLNESS:  Mrs. Bowen is a 68-year-old white female with   multiple medical problems who is presenting to the Physical Medicine Clinic for   management of her fibromyalgia, chronic back pain, and neck pain.  Her past   medical history is significant for hypertension, diabetes mellitus, chronic   kidney disease, hypothyroidism, morbid obesity (BMI of 43.5 today), fibromyalgia   syndrome diagnosed many years ago, chronic low back pain due to DJD, status   post multiple procedures including RFA and epidural steroid injections,   scoliosis, chronic neck pain, neurogenic bladder, status post suprapubic   catheter and bilateral carpal tunnel syndrome.    The patient started complaining of back pain about 10 years ago.  She was   diagnosed with DJD.  Her symptoms initially progressively getting worse.  They   have been stable for the last two years on her pain medications without the need   for escalation.  She has been followed up by Rheumatology.  Her low back pain   is a constant aching, throbbing and stabbing in the low lumbar spine.  She   denies any radiation to her legs.  It is aggravated by activity and better with   rest.  Her maximum pain is 9/10 and minimum 6/10.  Today, it is 7/10.  The   patient complains of bilateral lower extremity weakness, but without change from   baseline.  She ambulates short distances with a walker and uses a scooter for   longer distances.  She has chronic bowel urgency.  She is status post suprapubic   catheter for neurogenic bladder.    Her thoracic spine pain is a constant aching pain in the interscapular region.    She has infrequent radiation anteriorly to the sternum.  Her maximum pain is   7-8/10 and minimum 5-6/10.  Today, it is 7/10.    Her neck pain is intermittent aching pain in the whole  cervical spine.  She   denies any radiation to her arms.  It is aggravated with neck movement and   better with rest.  Her maximum pain is 8/10 and minimum 5/10.  Today, it is   5/10.  The patient denies any upper extremity weakness.  She complains of   bilateral hand numbness, attributed to carpal tunnel syndrome.  She has been   wearing the carpal tunnel braces consistently at night.  In addition to her   spine pain.  The patient has been complaining of generalized aching, fatigue,   and sleep impairment due to fibromyalgia syndrome.    She is currently taking venlafaxine XR 75 mg tablets, two tablets once per day.    It is prescribed by her primary care provider for depression.  She was told   that this is also the medicine that can be used for arthritis pain or   fibromyalgia.  She takes hydrocodone/APAP 10/325 p.r.n. four times per day.  She   reports that she has been on this dose for many years without need for   aspiration and without any significant side effect.  She takes methocarbamol 750   mg p.r.n., usually one to two tablets three times per day.  In the past (2014),   she failed cyclobenzaprine and tizanidine.      MS/HN  dd: 02/27/2019 09:53:51 (CST)  td: 02/28/2019 06:29:52 (CST)  Doc ID   #[id number]  Job ID #[id number]    CC:       Review of Systems   Constitutional: Positive for fatigue. Negative for chills and fever.   Eyes: Negative for visual disturbance.   Respiratory: Negative for chest tightness.    Cardiovascular: Negative for chest pain.   Gastrointestinal: Positive for diarrhea. Negative for abdominal pain, constipation, nausea and vomiting.   Genitourinary: Positive for difficulty urinating.   Musculoskeletal: Positive for arthralgias, back pain, gait problem, neck pain and neck stiffness. Negative for joint swelling.   Neurological: Positive for dizziness, weakness and headaches.   Psychiatric/Behavioral: Positive for sleep disturbance. Negative for behavioral problems.       Objective:       Physical Exam   Constitutional: She is oriented to person, place, and time. She appears well-developed and well-nourished.   Coming to the clinic in a manual wheelchair propelled by medical assistant     CLARISA:   Head: Normocephalic and atraumatic.   Neck: Normal range of motion.   Mild pain at end range.  +ve tenderness.   Cardiovascular: Normal rate, regular rhythm and normal heart sounds.   Pulmonary/Chest: Effort normal and breath sounds normal.   Abdominal: Soft.   Musculoskeletal:   BUE:  ROM:   RUE: full.   LUE: full.  Strength:    RUE: 5-/5 at shoulder abduction, 5 elbow flexion, 5 elbow extension, 5 hand .   LUE: 5-/5 at shoulder abduction, 5 elbow flexion, 5 elbow extension, 5 hand .  Sensation to pinprick:   RUE: intact.   LUE: intact.  DTR:    RUE: +1 biceps, +1 triceps.   LUE:  +1 biceps, +1 triceps.  Street:   RUE: -ve.   LUE: -ve.    BLE:  ROM:   RLE: full.   LLE: full.  Knee crepitus:   RLE: +ve.   LLE: +ve.   Strength:    RLE: 5-/5 at hip flexion, 5 knee extension, 5 ankle DF, 5 PF.   LLE: 5-/5 at hip flexion, 5 knee extension, 5 ankle DF, 5 PF.  Sensation to pinprick:     RLE: intact.      LLE: intact.   DTR:     RLE: +1 knee, +1 ankle.    LLE: +1 knee, +1 ankle.  Clonus:    Rt ankle: -ve.    Lt ankle: -ve.  SLR (sitting):      RLE: -ve.      LLE: -ve.    +ve tenderness over thoracic and lumbar spine.  +ve diffuse tender points over the upper & lower back, anterior chest wall, hips, elbows & knees.     Neurological: She is alert and oriented to person, place, and time.   Skin: Skin is warm.   Psychiatric: She has a normal mood and affect. Her behavior is normal.   Vitals reviewed.      Assessment:       1. Chronic midline low back pain without sciatica    2. Spondylosis of lumbar region without myelopathy or radiculopathy    3. Chronic midline thoracic back pain    4. Chronic neck pain    5. Spondylosis of cervical region without myelopathy or radiculopathy    6. Fibromyalgia    7. Morbid  obesity with BMI of 40.0-44.9, adult    8. Chronic use of opiate for therapeutic purpose        Plan:       - NSAIDs will be avoided due to CKD.  - Continue venlafaxine -XR 75 mg capsule, 2 capsules (150 mg total) once daily (for depression but should help with arthritic pain and fibromyalgia).  - Continue HYDROcodone-acetaminophen (NORCO)  mg per tablet; Take 1 tablet by mouth every 6 (six) hours as needed.  - Continue methocarbamol (ROBAXIN) 750 MG Tab; Take 1-2 tablets (750-1,500 mg total) by mouth 3 (three) times daily as needed.  - She was encouraged to use: Tylenol 500 mg, 1-2 po tid prn for mild pain.  - Continue to wear Carpal Tunnel Splints at night. If symptoms progress, she may need steroid injection into carpal tunnel.  - Weight loss was encouraged.  - Regular home exercise program was encouraged.  - Pain Clinic Drug Screen; Future  - A Pain contract was signed and will be scanned into the chart.  - Follow-up in about 3 months (around 5/27/2019).     This was a 60 minute visit, 50% of which was spent educating the patient about the diagnosis and the treatment plan.          This was a 45 minute visit, 50% of which was spent educating the patient about the diagnosis and the treatment plan.

## 2019-02-27 NOTE — PROGRESS NOTES
RHEUMATOLOGY ATTENDING:  Patient presented, personally interviewed and examined, medical records reviewed.  71 yr old lady followed by Dr. Garvey for generalized OA w LBP and fibromyalgia/CSS.  She has multiple other morbidities including CKD4, DM II & morbid obesity. She is wheelchair bound.  She requests her pain meds.  We are willing to start tapering her off narcotics, and refer her to Pain Management or PM&R for follow up.   She agrees to see PM&R & we were able to get an appointment today.   Findings discussed with patient and Dr. Dewey whose note and assessment I agree with.

## 2019-02-27 NOTE — LETTER
February 27, 2019        Carmenza Dewey MD  [address]  [facility] [address]           [facility]  [address]  [facility] [address]  Phone: [phone]          Patient: Cecile Bowen   MR Number: [id number]   YOB: 1950   Date of Visit: 2/27/2019       Dear Dr. Carmenza Dewey:    Thank you for referring Cecile Bowen to me for evaluation. Attached you will find relevant portions of my assessment and plan of care.    If you have questions, please do not hesitate to call me. I look forward to following Cecile Bowen along with you.    Sincerely,    Tesha Stafford MD    Enclosure  CC:  No Recipients    If you would like to receive this communication electronically, please contact [email or web address] or [phone] to request more information on [employer] Link access.    For providers and/or their staff who would like to refer a patient to [facility], please contact us through our one-stop-shop provider referral line, [facility], at [phone].    If you feel you have received this communication in error or would no longer like to receive these types of communications, please e-mail [email or web address]

## 2019-03-04 DIAGNOSIS — G43.711 INTRACTABLE CHRONIC MIGRAINE WITHOUT AURA AND WITH STATUS MIGRAINOSUS: ICD-10-CM

## 2019-03-04 RX ORDER — SUMATRIPTAN SUCCINATE 100 MG/1
100 TABLET ORAL 2 TIMES DAILY PRN
Qty: 9 TABLET | Refills: 6 | Status: SHIPPED | OUTPATIENT
Start: 2019-03-04 | End: 2019-09-03 | Stop reason: SDUPTHER

## 2019-03-05 ENCOUNTER — PATIENT MESSAGE (OUTPATIENT)
Dept: ENDOCRINOLOGY | Facility: CLINIC | Age: 69
End: 2019-03-05

## 2019-03-08 ENCOUNTER — TELEPHONE (OUTPATIENT)
Dept: PHARMACY | Facility: CLINIC | Age: 69
End: 2019-03-08

## 2019-03-13 ENCOUNTER — TELEPHONE (OUTPATIENT)
Dept: UROLOGY | Facility: CLINIC | Age: 69
End: 2019-03-13

## 2019-03-13 DIAGNOSIS — N31.9 NEUROGENIC BLADDER: Primary | ICD-10-CM

## 2019-03-13 RX ORDER — ERGOCALCIFEROL 1.25 MG/1
CAPSULE ORAL
Qty: 12 CAPSULE | Refills: 3 | Status: SHIPPED | OUTPATIENT
Start: 2019-03-13 | End: 2019-03-14 | Stop reason: SDUPTHER

## 2019-03-13 NOTE — PROGRESS NOTES
CC: This 68 y.o. female presents for management of diabetes mellitus     HPI: She was diagnosed with T2DM in 2006, found on routine bloodwork screening given family hx of DM. Previously tried Actos but not effective.   Last seen by CAROLE Dwyer DNP in 2017 and is now being seen by me for the second time.  Loves freestyle nadya, started use x 2 weeks ago.  Has 6% of hypoglycemic events, occurring in the am, later in the day.   Need f/u with 3 mos w/ [employer] supplier (freestyle nadya).  Lives w/ brother (scoliosis, ca) and sister (has dm), gets meals on wheels.               Lab Results   Component Value Date    HGBA1C 7.9 (H) 01/22/2019     Lives w/ brother and sister.   Brother-scoliosis  Sister-herniated disc  Hard to cook meals, ADLs at times     DIET/ MEAL PATTERN: Eat 3 meals a day,   Breakfast- yogurts, fruit  Lunch-  Meals on wheels-hot meals, smoothie from [employer]   Dinner- Ready to Eat from [employer] , [employer]  Bedtime snacks-limits, if so popcorn    EXERCISE: no formal exercise, limited r/t back pain  7/10    CURRENT DM MEDS: Lantus Solostar 32 units daily, Humalog vials 12 units ac w/ scale  Checks Sugar 6-10  times a day.                       Switching insurances in July and she is switching to [facility]    STANDARDS OF CARE:  Eye exam:  8/2018. No history of retinopathy. Sees Dr Keanrs   Foot exam: 10/2018     BMD 12/16- + osteopenia, due 12/2019, needs to reschedule                    ROS:   Gen: good appetite, generalized fatigue and weakness. Wt loss 4#  Skin: Skin is intact, no rashes .  Eyes: + visual disturbances, +cataracts in both eyes, has 2 different glasses   Resp:   no SOB + WOLFE, no cough  Cardiac: No palpitations, chest pain, denies syncope, weakness, no edema or cyanosis.  GI: No nausea or vomiting, no constipation   /GYN: has suprapubic catheter placed.   PVD: c/o chronic back pain rates 7/10.  MS/Neuro: Denies numbness/ tingling in BLE; migraines every day-imitrex (generic-takes  1/2 tab #9 a month, excedrin migraine). In scooter  Psych: Denies drug/ETOH abuse, no hx. of eating disorders   Other systems: negative.    PE:  GENERAL: Well developed, well nourished.  PSYCH: AAOx3, appropriate mood and affect, pleasant expression, conversant, appears relaxed, well groomed.   EYES: Conjunctiva, corneas clear, no lid lag, EOM intact.  NECK: Supple, trachea midline  VASCULAR:  1+ (L) worse than (R)edema BLE, brisk capillary refill BUE.  SKIN:   Skin warm and dry.  no acanthosis nigracans. +venous stasis-worse on (L)-marking   Feet- footwear appropriate -diabetic shoes and socks      Hemoglobin A1C   Date Value Ref Range Status   01/22/2019 7.9 (H) 4.0 - 5.6 % Final     Comment:     ADA Screening Guidelines:  5.7-6.4%  Consistent with prediabetes  >or=6.5%  Consistent with diabetes  High levels of fetal hemoglobin interfere with the HbA1C  assay. Heterozygous hemoglobin variants (HbS, HgC, etc)do  not significantly interfere with this assay.   However, presence of multiple variants may affect accuracy.     12/13/2018 8.5 (H) 4.0 - 5.6 % Final     Comment:     ADA Screening Guidelines:  5.7-6.4%  Consistent with prediabetes  >or=6.5%  Consistent with diabetes  High levels of fetal hemoglobin interfere with the HbA1C  assay. Heterozygous hemoglobin variants (HbS, HgC, etc)do  not significantly interfere with this assay.   However, presence of multiple variants may affect accuracy.     10/19/2018 12.1 (H) 4.0 - 5.6 % Final     Comment:     ADA Screening Guidelines:  5.7-6.4%  Consistent with prediabetes  >or=6.5%  Consistent with diabetes  High levels of fetal hemoglobin interfere with the HbA1C  assay. Heterozygous hemoglobin variants (HbS, HgC, etc)do  not significantly interfere with this assay.   However, presence of multiple variants may affect accuracy.       Lab Results   Component Value Date    TSH 5.206 (H) 12/13/2018       ASSESSMENT and PLAN:  1. Uncontrolled type 2 diabetes mellitus with  diabetic neuropathy, without long-term current use of insulin  Hemoglobin A1c    TSH    F/u in 3 mos  a1c goal less than 7.5%  Continue freestyle nadya   Continue to check >4 times a day    Decrease by 20%   Basal 27 units daily  Prandial 7-10-10 units ac w/ scale  Good days use less aggressive one 180-230+1, etc  Scale 180-230+2, etc-migraine/sick days     2. Uncontrolled type 2 diabetes mellitus with chronic kidney disease, with long-term current use of insulin  Continue to monitor  Avoid hypoglycemia and insulin stacking    3. Vitamin D deficiency disease  On ergo 50,000 weekly   4. Mixed hyperlipidemia  Lab Results   Component Value Date    LDLCALC 100.0 12/13/2018        5. Weakness generalized  See above   6. CKD (chronic kidney disease) stage 4, GFR 15-29 ml/min  Avoid hypoglycemia and insulin stacking   F/u with nephrology    7. Morbid obesity due to excess calories  Body mass index is 43.33 kg/m². may increase insulin resistance    8. Osteopenia, unspecified location  On vit d  No fosamax, last dexa scab 12/2019    9. VIJAYA (obstructive sleep apnea)  If uncontrolled, may increase insulin resistance  Not on cpap    10. Nuclear sclerosis of both eyes  F/u with ophthalmology    11. Neurogenic bladder  F/u with urology, nephrology   On suprapubic catheter-unable to do water aerobics   Be mindful uti, yeast infection    12. Major depressive disorder, recurrent episode, moderate  On effexor 150 mg daily , stable   13. Chronic bilateral low back pain without sciatica  Uses scooter, may increase insulin resistance  Encourage exercise

## 2019-03-14 ENCOUNTER — OFFICE VISIT (OUTPATIENT)
Dept: ENDOCRINOLOGY | Facility: CLINIC | Age: 69
End: 2019-03-14
Payer: MEDICARE

## 2019-03-14 ENCOUNTER — OFFICE VISIT (OUTPATIENT)
Dept: UROLOGY | Facility: CLINIC | Age: 69
End: 2019-03-14
Payer: MEDICARE

## 2019-03-14 ENCOUNTER — ANESTHESIA EVENT (OUTPATIENT)
Dept: SURGERY | Facility: HOSPITAL | Age: 69
End: 2019-03-14
Payer: MEDICARE

## 2019-03-14 VITALS
DIASTOLIC BLOOD PRESSURE: 74 MMHG | SYSTOLIC BLOOD PRESSURE: 164 MMHG | HEART RATE: 89 BPM | WEIGHT: 285 LBS | BODY MASS INDEX: 43.19 KG/M2 | HEIGHT: 68 IN

## 2019-03-14 VITALS
BODY MASS INDEX: 43.19 KG/M2 | HEIGHT: 68 IN | SYSTOLIC BLOOD PRESSURE: 122 MMHG | HEART RATE: 82 BPM | WEIGHT: 285 LBS | DIASTOLIC BLOOD PRESSURE: 77 MMHG

## 2019-03-14 DIAGNOSIS — H25.13 NUCLEAR SCLEROSIS OF BOTH EYES: ICD-10-CM

## 2019-03-14 DIAGNOSIS — R53.1 WEAKNESS GENERALIZED: ICD-10-CM

## 2019-03-14 DIAGNOSIS — L92.9 GRANULATION TISSUE: ICD-10-CM

## 2019-03-14 DIAGNOSIS — E55.9 VITAMIN D DEFICIENCY DISEASE: ICD-10-CM

## 2019-03-14 DIAGNOSIS — N32.89 BLADDER SPASMS: ICD-10-CM

## 2019-03-14 DIAGNOSIS — Z43.5 ENCOUNTER FOR CARE OR REPLACEMENT OF SUPRAPUBIC TUBE: ICD-10-CM

## 2019-03-14 DIAGNOSIS — M85.80 OSTEOPENIA, UNSPECIFIED LOCATION: ICD-10-CM

## 2019-03-14 DIAGNOSIS — N31.9 NEUROGENIC BLADDER: Primary | ICD-10-CM

## 2019-03-14 DIAGNOSIS — E66.01 MORBID OBESITY DUE TO EXCESS CALORIES: ICD-10-CM

## 2019-03-14 DIAGNOSIS — Z93.59 CHRONIC SUPRAPUBIC CATHETER: ICD-10-CM

## 2019-03-14 DIAGNOSIS — G89.29 CHRONIC BILATERAL LOW BACK PAIN WITHOUT SCIATICA: ICD-10-CM

## 2019-03-14 DIAGNOSIS — E03.9 ACQUIRED HYPOTHYROIDISM: ICD-10-CM

## 2019-03-14 DIAGNOSIS — N31.9 NEUROGENIC BLADDER: ICD-10-CM

## 2019-03-14 DIAGNOSIS — M54.50 CHRONIC BILATERAL LOW BACK PAIN WITHOUT SCIATICA: ICD-10-CM

## 2019-03-14 DIAGNOSIS — E78.2 MIXED HYPERLIPIDEMIA: ICD-10-CM

## 2019-03-14 DIAGNOSIS — F33.1 MAJOR DEPRESSIVE DISORDER, RECURRENT EPISODE, MODERATE: ICD-10-CM

## 2019-03-14 DIAGNOSIS — N18.4 CKD (CHRONIC KIDNEY DISEASE) STAGE 4, GFR 15-29 ML/MIN: ICD-10-CM

## 2019-03-14 DIAGNOSIS — G47.33 OSA (OBSTRUCTIVE SLEEP APNEA): ICD-10-CM

## 2019-03-14 LAB
BACTERIA #/AREA URNS AUTO: ABNORMAL /HPF
BILIRUB UR QL STRIP: NEGATIVE
CLARITY UR REFRACT.AUTO: ABNORMAL
COLOR UR AUTO: YELLOW
GLUCOSE UR QL STRIP: NEGATIVE
HGB UR QL STRIP: ABNORMAL
HYALINE CASTS UR QL AUTO: 0 /LPF
KETONES UR QL STRIP: NEGATIVE
LEUKOCYTE ESTERASE UR QL STRIP: ABNORMAL
MICROSCOPIC COMMENT: ABNORMAL
NITRITE UR QL STRIP: POSITIVE
PH UR STRIP: 7 [PH] (ref 5–8)
PROT UR QL STRIP: ABNORMAL
RBC #/AREA URNS AUTO: 29 /HPF (ref 0–4)
SP GR UR STRIP: 1.01 (ref 1–1.03)
URN SPEC COLLECT METH UR: ABNORMAL
WBC #/AREA URNS AUTO: 40 /HPF (ref 0–5)
WBC CLUMPS UR QL AUTO: ABNORMAL

## 2019-03-14 PROCEDURE — 99214 PR OFFICE/OUTPT VISIT, EST, LEVL IV, 30-39 MIN: ICD-10-PCS | Mod: S$PBB,,, | Performed by: NURSE PRACTITIONER

## 2019-03-14 PROCEDURE — 99999 PR PBB SHADOW E&M-EST. PATIENT-LVL V: ICD-10-PCS | Mod: PBBFAC,,, | Performed by: NURSE PRACTITIONER

## 2019-03-14 PROCEDURE — 17250 CHEM CAUT OF GRANLTJ TISSUE: CPT | Mod: PBBFAC | Performed by: NURSE PRACTITIONER

## 2019-03-14 PROCEDURE — 87186 SC STD MICRODIL/AGAR DIL: CPT

## 2019-03-14 PROCEDURE — 17250 PR CHEM CAUTERY GRANULATN TISSUE: ICD-10-PCS | Mod: S$PBB,,, | Performed by: NURSE PRACTITIONER

## 2019-03-14 PROCEDURE — 99214 OFFICE O/P EST MOD 30 MIN: CPT | Mod: S$PBB,25,, | Performed by: NURSE PRACTITIONER

## 2019-03-14 PROCEDURE — 99215 OFFICE O/P EST HI 40 MIN: CPT | Mod: PBBFAC,25 | Performed by: NURSE PRACTITIONER

## 2019-03-14 PROCEDURE — 51705 CHANGE OF BLADDER TUBE: CPT | Mod: PBBFAC | Performed by: NURSE PRACTITIONER

## 2019-03-14 PROCEDURE — 81001 URINALYSIS AUTO W/SCOPE: CPT

## 2019-03-14 PROCEDURE — 87088 URINE BACTERIA CULTURE: CPT

## 2019-03-14 PROCEDURE — 51705 PR CHANGE OF BLADDER TUBE,SIMPLE: ICD-10-PCS | Mod: S$PBB,,, | Performed by: NURSE PRACTITIONER

## 2019-03-14 PROCEDURE — 99214 OFFICE O/P EST MOD 30 MIN: CPT | Mod: S$PBB,,, | Performed by: NURSE PRACTITIONER

## 2019-03-14 PROCEDURE — 99999 PR PBB SHADOW E&M-EST. PATIENT-LVL V: CPT | Mod: PBBFAC,,, | Performed by: NURSE PRACTITIONER

## 2019-03-14 PROCEDURE — 87077 CULTURE AEROBIC IDENTIFY: CPT

## 2019-03-14 PROCEDURE — 99215 OFFICE O/P EST HI 40 MIN: CPT | Mod: PBBFAC,27,25 | Performed by: NURSE PRACTITIONER

## 2019-03-14 PROCEDURE — 99214 PR OFFICE/OUTPT VISIT, EST, LEVL IV, 30-39 MIN: ICD-10-PCS | Mod: S$PBB,25,, | Performed by: NURSE PRACTITIONER

## 2019-03-14 PROCEDURE — 51705 CHANGE OF BLADDER TUBE: CPT | Mod: S$PBB,,, | Performed by: NURSE PRACTITIONER

## 2019-03-14 PROCEDURE — 17250 CHEM CAUT OF GRANLTJ TISSUE: CPT | Mod: S$PBB,,, | Performed by: NURSE PRACTITIONER

## 2019-03-14 PROCEDURE — 87086 URINE CULTURE/COLONY COUNT: CPT

## 2019-03-14 RX ORDER — INSULIN GLARGINE 100 [IU]/ML
INJECTION, SOLUTION SUBCUTANEOUS
Qty: 15 ML | Refills: 6
Start: 2019-03-14 | End: 2019-06-13

## 2019-03-14 RX ORDER — ERGOCALCIFEROL 1.25 MG/1
CAPSULE ORAL
Qty: 12 CAPSULE | Refills: 3 | Status: SHIPPED | OUTPATIENT
Start: 2019-03-14 | End: 2020-02-07

## 2019-03-14 RX ORDER — INSULIN LISPRO 100 [IU]/ML
INJECTION, SOLUTION INTRAVENOUS; SUBCUTANEOUS
Qty: 20 ML | Refills: 6
Start: 2019-03-14 | End: 2019-06-13

## 2019-03-14 NOTE — PROGRESS NOTES
Subjective:       Patient ID: Cecile Bowen is a 68 y.o. female.    Chief Complaint: Neurogenic bladder    Cecile Bowen is a 68 y.o. Diabetic Female with history of bilateral hydronephrosis, incomplete bladder emptying which is managed by SPT (16fr).  S/p Cystoscopy with bladder botox injection (300u) 09/17/2018 with Dr. Whittington.    Her last SPT change was 02/14/2019.    Here today for scheduled SPT change.  Bladder spasm present but greatly reduced with Botox.  She is having cysto with Botox 3/25/2019 with Dr. Whittington    No fever, n/v              Past Medical History:    Diabetes type 2, uncontrolled                   12/12/2012    Obesity                                         12/12/2012    Hypertension                                    12/12/2012    DJD (degenerative joint disease)                12/12/2012    Hypothyroidism                                  12/12/2012    Nuclear sclerosis - Both Eyes                   3/24/2014     Migraine                                                      Past Surgical History:    TOTAL ABDOMINAL HYSTERECTOMY W/ BILATERAL SALP*  1985          GALLBLADDER SURGERY                              2006          TONSILLECTOMY, ADENOIDECTOMY                                   OVARIAN CYST SURGERY                             1985          HYSTERECTOMY                                                   DILATION AND CURETTAGE OF UTERUS                               Review of patient's family history indicates:    Diabetes                       Sister                    Kidney disease                 Sister                    ALS                            Mother                      Comment: d.    Cancer                         Maternal Grandmother        Comment: d. colon    Cancer                         Paternal Grandfather        Comment: d. lung    Diabetes                       Maternal Aunt             Kidney disease                 Maternal Aunt             Diabetes    "                    Maternal Uncle            Amblyopia                      Neg Hx                    Blindness                      Neg Hx                    Cataracts                      Neg Hx                    Glaucoma                       Neg Hx                    Macular degeneration           Neg Hx                    Retinal detachment             Neg Hx                    Strabismus                     Neg Hx                      Social History    Marital Status:             Spouse Name:                       Years of Education:                 Number of children:               Occupational History    None on file    Social History Main Topics    Smoking Status: Never Smoker                      Smokeless Status: Never Used                        Alcohol Use: No              Drug Use: No              Sexual Activity: Not Currently           Birth Control/Protection: Abstinence    Other Topics            Concern    None on file    Social History Narrative    Single      Lives w/ sister  And brother     both are helping her during her post hosp recovery period        Allergies:  Review of patient's allergies indicates no known allergies.    Medications:  Current outpatient prescriptions:amitriptyline (ELAVIL) 10 MG tablet, TAKE 3 TABLETS BY MOUTH IN THE EVENING, Disp: 90 tablet, Rfl: 5;  aspirin (ECOTRIN) 81 MG EC tablet, Take 81 mg by mouth once daily., Disp: , Rfl: ;  BD INSULIN PEN NEEDLE UF SHORT 31 X 5/16 " Ndle, USE ONCE DAILY WITH LANTUS SOLOSTAR PEN INSULIN, Disp: 100 each, Rfl: 11  butalbital-acetaminophen-caffeine -40 mg (FIORICET, ESGIC) -40 mg per tablet, TAKE 1 TABLET EVERY 4 TO 6 HOURS, Disp: 30 tablet, Rfl: 0;  cyanocobalamin, vitamin B-12, (VITAMIN B-12) 2,500 mcg Subl, Place 2,500 mcg under the tongue once daily., Disp: , Rfl: ;  diphenhydrAMINE (BENADRYL) 25 mg capsule, Take 25 mg by mouth every 6 (six) hours as needed., Disp: , Rfl:   ferrous sulfate 325 (65 FE) MG " "EC tablet, Take 325 mg by mouth 3 (three) times daily with meals., Disp: , Rfl: ;  fluticasone (FLONASE) 50 mcg/actuation nasal spray, 1 SPRAY IN EACH NOSTRIL DAILY (Patient taking differently: 1 SPRAY IN EACH NOSTRIL DAILY PRN), Disp: 16 g, Rfl: 1;  furosemide (LASIX) 20 MG tablet, Take 1 tablet (20 mg total) by mouth once daily., Disp: 4 tablet, Rfl: 0  gemfibrozil (LOPID) 600 MG tablet, TAKE 1 TABLET BY MOUTH TWICE A DAY, Disp: 60 tablet, Rfl: 5;  (START ON 4/29/2015) hydrocodone-acetaminophen 10-325mg (NORCO)  mg Tab, Take 1 tablet by mouth every 6 (six) hours as needed., Disp: 120 tablet, Rfl: 0;  insulin lispro (HUMALOG) 100 unit/mL injection, Inject 7 Units into the skin 3 (three) times daily before meals., Disp: 6.3 mL, Rfl: 11  insulin syringe-needle U-100 (BD INSULIN SYRINGE ULTRA-FINE) 1/2 mL 31 x 15/64" Syrg, 1 Box by Misc.(Non-Drug; Combo Route) route 4 (four) times daily., Disp: 100 Syringe, Rfl: 12;  lancets (ONE TOUCH DELICA LANCETS) Misc, Ultra 2 lancets to check blood sugar BID, Disp: 100 each, Rfl: 6;  LANTUS SOLOSTAR 100 unit/mL (3 mL) InPn pen, , Disp: , Rfl: 3  LIDODERM 5 %(700 mg/patch), APPLY 1 PATCH TO SKIN DAILY (LEAVING ON FOR 12 HOURS AND OFF FOR 12 HOURS), Disp: 30 patch, Rfl: 6;  magnesium oxide (MAG-OX) 400 mg tablet, TAKE 1 TABLET (400 MG TOTAL) BY MOUTH 2 (TWO) TIMES DAILY., Disp: 60 tablet, Rfl: 11;  methocarbamol (ROBAXIN) 750 MG Tab, TAKE 1 TO 2 TABLETS BY MOUTH 3 TIMES A DAY (Patient taking differently: Take 2 tablets twice daily), Disp: 120 tablet, Rfl: 3  methocarbamol (ROBAXIN) 750 MG Tab, TAKE 1 TO 2 TABLETS BY MOUTH 3 TIMES A DAY, Disp: 120 tablet, Rfl: 3;  methylPREDNISolone (MEDROL DOSEPACK) 4 mg tablet, use as directed, Disp: 21 tablet, Rfl: 0;  multivitamin with minerals tablet, Take 1 tablet by mouth once daily., Disp: , Rfl: ;  pravastatin (PRAVACHOL) 40 MG tablet, TAKE 1 TABLET BY MOUTH EVERY DAY, Disp: 30 tablet, Rfl: 2  pyridoxine (B-6) 100 MG Tab, Take 1 " tablet (100 mg total) by mouth once daily., Disp: 30 tablet, Rfl: 12;  scopolamine (TRANSDERM-SCOP) 1.5 mg, Place 1 patch (1.5 mg total) onto the skin every 72 hours., Disp: 4 patch, Rfl: 0;  sulfamethoxazole-trimethoprim 800-160mg (BACTRIM DS) 800-160 mg Tab, Take 1 tablet by mouth 2 (two) times daily., Disp: , Rfl: 0  sumatriptan (IMITREX) 100 MG tablet, TAKE 1 TABLET (100 MG TOTAL) BY MOUTH ONCE., Disp: 9 tablet, Rfl: 6;  SYNTHROID 125 mcg tablet, TAKE 1 TABLET BY MOUTH DAILY, Disp: 90 tablet, Rfl: 4;  topiramate (TOPAMAX) 100 MG tablet, TAKE 1 TABLET (100 MG TOTAL) BY MOUTH 2 (TWO) TIMES DAILY., Disp: 60 tablet, Rfl: 11;  topiramate (TOPAMAX) 25 MG tablet, Take 4 tablets (100 mg total) by mouth 2 (two) times daily., Disp: 240 tablet, Rfl: 5  venlafaxine (EFFEXOR-XR) 150 MG Cp24, TAKE 1 CAPSULE BY MOUTH ONCE DAILY, Disp: 30 capsule, Rfl: 2;  vitamin D (VITAMIN D3) 185 MG Tab, Take 185 mg by mouth once daily., Disp: , Rfl:           Review of Systems   Constitutional: Negative.  Negative for chills and fever.   HENT: Negative for facial swelling and trouble swallowing.    Eyes: Negative for visual disturbance.   Respiratory: Negative for cough, chest tightness, shortness of breath and wheezing.    Cardiovascular: Negative for chest pain and palpitations.   Gastrointestinal: Negative for abdominal pain, constipation, nausea and vomiting.   Genitourinary: Positive for difficulty urinating and urgency. Negative for dysuria, hematuria and vaginal bleeding.        SPT draining well;   (+) bladder spasms     Musculoskeletal: Positive for gait problem and myalgias.   Skin: Negative for rash.   Neurological: Positive for headaches. Negative for dizziness.        Hx of Migraines     Hematological: Does not bruise/bleed easily.   Psychiatric/Behavioral: Negative for behavioral problems.       Objective:      Physical Exam   Nursing note and vitals reviewed.  Constitutional: She is oriented to person, place, and time. Vital  signs are normal. She appears well-developed and well-nourished. She is active and cooperative.   HENT:   Head: Normocephalic.   Right Ear: External ear normal.   Left Ear: External ear normal.   Nose: Nose normal.   Eyes: Conjunctivae and lids are normal. Right eye exhibits no discharge. Left eye exhibits no discharge. No scleral icterus.   Neck: Trachea normal and normal range of motion. Neck supple. No tracheal deviation present.   Cardiovascular: Normal rate, regular rhythm and intact distal pulses.    Pulmonary/Chest: Effort normal. No respiratory distress.   Abdominal: Soft. Normal appearance and bowel sounds are normal. She exhibits no distension and no mass. There is no tenderness. There is no rebound, no guarding and no CVA tenderness.       Musculoskeletal: Normal range of motion. She exhibits no edema.   Neurological: She is alert and oriented to person, place, and time.   Skin: Skin is warm, dry and intact.     Psychiatric: She has a normal mood and affect. Her behavior is normal. Judgment and thought content normal.       Assessment:       1. Neurogenic bladder    2. Bladder spasms    3. Chronic suprapubic catheter    4. Encounter for care or replacement of suprapubic tube    5. Granulation tissue        Plan:         I spent 30 minutes with the patient of which more than half was spent in direct consultation with the patient in regards to our treatment and plan.    Education and recommendations of today's plan of care including home remedies.  Spoke with MsShon Jessi about allowing our new NP Nithin Velzaquez to exchange her SPT; she gave verbal consent.  Old SPT easily removed.   Silver nitrite sticks x 3 to granulation tissue.  Stoma prepped with betadine.   New 16fr SPT eaplaced; urine received.   Urine sent to lab for culture.  Irrigated the bladder.  Balloon inflated 9cc sterile water.  Tolerated well  Will contact regarding urine results and treatment prior to procedure.  RTC 4 weeks post botox  (04/25/2019)  Voiced understanding

## 2019-03-14 NOTE — PRE ADMISSION SCREENING
Anesthesia Assessment: Preoperative EQUATION    Planned Procedure: Procedure(s) (LRB):  CYSTOSCOPY (N/A)  INJECTION, BOTULINUM TOXIN, TYPE A 300 UNITS (N/A)  Requested Anesthesia Type:Monitor Anesthesia Care  Surgeon: Ronel Whittington MD  Service: Urology  Known or anticipated Date of Surgery:3/25/2019    Surgeon notes: reviewed    Electronic QUestionnaire Assessment completed via nurse interview with patient.        NO AQ      Triage considerations:     The patient has no apparent active cardiac condition (No unstable coronary Syndrome such as severe unstable angina or recent [<1 month] myocardial infarction, decompensated CHF, severe valvular   disease or significant arrhythmia)    Previous anesthesia records:GETA, MAC and No problems 10/8/18 cysto:  Airway/Jaw/Neck:  Airway Findings: Mouth Opening: Normal Tongue: Normal  General Airway Assessment: Adult  Mallampati: II  TM Distance: Normal, at least 6 cm  Jaw/Neck Findings:  Neck ROM: Normal ROM      Dental:  Dental Findings: Edentulous         Last PCP note: within 3 months , within [facility]   Subspecialty notes: Endocrinology, Nephrology, Rheumatology, Physical med    Other important co-morbidities: HTN/HLP, VIJAYA, Hypothyroidism, CKD4, DM2, morbid obesity, GERD, HX anemia     Tests already available:  Available tests,  within 3 months , within [facility] . 1/22/19 A1c, CBC, PTH, renal function. 12/13/18 TSH. 5/2018 EKG            Instructions given. (See in Nurse's note)    Optimization:  Anesthesia Preop Clinic Assessment  Indicated-not required for this procedure       Plan:    Testing:  none       Patient  has previously scheduled Medical Appointment: 3/14 endo, urology    Navigation:                Straight Line to surgery.               No tests, anesthesia preop clinic visit, or consult required.

## 2019-03-14 NOTE — ANESTHESIA PREPROCEDURE EVALUATION
Louisa Sevilla, RN   Registered Nurse      Pre Admission Screening   Signed                             []Hide copied text    []Hover for details      Anesthesia Assessment: Preoperative EQUATION     Planned Procedure: Procedure(s) (LRB):  CYSTOSCOPY (N/A)  INJECTION, BOTULINUM TOXIN, TYPE A 300 UNITS (N/A)  Requested Anesthesia Type:Monitor Anesthesia Care  Surgeon: Ronel Whittington MD  Service: Urology  Known or anticipated Date of Surgery:3/25/2019     Surgeon notes: reviewed     Electronic QUestionnaire Assessment completed via nurse interview with patient.         NO AQ        Triage considerations:      The patient has no apparent active cardiac condition (No unstable coronary Syndrome such as severe unstable angina or recent [<1 month] myocardial infarction, decompensated CHF, severe valvular   disease or significant arrhythmia)     Previous anesthesia records:GETA, MAC and No problems 10/8/18 cysto:  Airway/Jaw/Neck:  Airway Findings: Mouth Opening: Normal Tongue: Normal  General Airway Assessment: Adult  Mallampati: II  TM Distance: Normal, at least 6 cm  Jaw/Neck Findings:  Neck ROM: Normal ROM      Dental:  Dental Findings: Edentulous           Last PCP note: within 3 months , within [facility]   Subspecialty notes: Endocrinology, Nephrology, Rheumatology, Physical med     Other important co-morbidities: HTN/HLP, VIJAYA, Hypothyroidism, CKD4, DM2, morbid obesity, GERD, HX anemia     Tests already available:  Available tests,  within 3 months , within [facility] . 1/22/19 A1c, CBC, PTH, renal function. 12/13/18 TSH. 5/2018 EKG                            Instructions given. (See in Nurse's note)     Optimization:  Anesthesia Preop Clinic Assessment  Indicated-not required for this procedure                Plan:    Testing:  none                              Patient  has previously scheduled Medical Appointment: 3/14 endo, urology     Navigation:                          Straight Line to surgery.                           No tests, anesthesia preop clinic visit, or consult required.                                                           Electronically signed by Louisa Sevilla RN at 3/14/2019  9:58 AM       Pre-admit on 3/25/2019            Detailed Report                                                                                                                    03/14/2019  Cecile Bowen is a 68 y.o., female.    Anesthesia Evaluation    I have reviewed the Patient Summary Reports.    I have reviewed the Nursing Notes.   I have reviewed the Medications.     Review of Systems  Anesthesia Hx:  No problems with previous Anesthesia  History of prior surgery of interest to airway management or planning: Previous anesthesia: MAC  10/8/18 cysto with MAC.  Procedure performed at an [facility] Facility. Denies Family Hx of Anesthesia complications.   Denies Personal Hx of Anesthesia complications.   Hematology/Oncology:  Hematology Normal   Oncology Normal     EENT/Dental:   Jaw Problems:  Clicking (TMJ)   Cardiovascular:   Hypertension  Denies Angina. hyperlipidemia  Functional Capacity 2 METS  Hypertension    Pulmonary:   Denies Shortness of breath.  Denies Recent URI.  Obstructive Sleep Apnea (VIJAYA), CPAP used.   Renal/:   Chronic Renal Disease  Kidney Function/Disease, Chronic Kidney Disease (CKD) , CKD Stage IV (GFR 15-29)  Other Renal / Gu Conditions: (neurogenic bladder)   Hepatic/GI:  Hepatic/GI Normal    Musculoskeletal:   Arthritis   Osteoarthritis    Neurological:   Headaches  Dx of Headaches, Migraine Headache Pain , onset is chronic , alleviating factors are daily norco 2-3/day. Pain Etiology/Diagnosis, Arthritis, Osteoarthritis, Fibromayalgia    Endocrine:   Diabetes Hypothyroidism  Diabetes, Type 2 Diabetes , controlled by insulin. Typical AM glucose range:  , most recent HgA1c value was 7.9 on 1/22/19.  Thyroid Disease Hypothyroidism  Metabolic Disorders, Morbid Obesity / BMI > 40  Psych:    depression          Physical Exam  General:  Well nourished, Morbid Obesity    Airway/Jaw/Neck:  Airway Findings: Mouth Opening: Normal Tongue: Normal  General Airway Assessment: Adult  Mallampati: II  TM Distance: 4 - 6 cm  Jaw/Neck Findings:  Neck ROM: Normal ROM       Chest/Lungs:  Chest/Lungs Findings: Clear to auscultation, Normal Respiratory Rate     Heart/Vascular:  Heart Findings: Rate: Normal  Rhythm: Regular Rhythm  Sounds: Normal        Mental Status:  Mental Status Findings:  Cooperative, Alert and Oriented         Anesthesia Plan  Type of Anesthesia, risks & benefits discussed:  Anesthesia Type:  general  Patient's Preference:   Intra-op Monitoring Plan: standard ASA monitors  Intra-op Monitoring Plan Comments:   Post Op Pain Control Plan: multimodal analgesia  Post Op Pain Control Plan Comments:   Induction:   IV  Beta Blocker:  Patient is not currently on a Beta-Blocker (No further documentation required).       Informed Consent: Patient understands risks and agrees with Anesthesia plan.  Questions answered. Anesthesia consent signed with patient.  ASA Score: 3     Day of Surgery Review of History & Physical:    H&P update referred to the surgeon.         Ready For Surgery From Anesthesia Perspective.

## 2019-03-14 NOTE — H&P (VIEW-ONLY)
Subjective:       Patient ID: Cecile Bowen is a 68 y.o. female.    Chief Complaint: Neurogenic bladder    Cecile Bowen is a 68 y.o. Diabetic Female with history of bilateral hydronephrosis, incomplete bladder emptying which is managed by SPT (16fr).  S/p Cystoscopy with bladder botox injection (300u) 09/17/2018 with Dr. Whittington.    Her last SPT change was 02/14/2019.    Here today for scheduled SPT change.  Bladder spasm present but greatly reduced with Botox.  She is having cysto with Botox 3/25/2019 with Dr. Whittington    No fever, n/v              Past Medical History:    Diabetes type 2, uncontrolled                   12/12/2012    Obesity                                         12/12/2012    Hypertension                                    12/12/2012    DJD (degenerative joint disease)                12/12/2012    Hypothyroidism                                  12/12/2012    Nuclear sclerosis - Both Eyes                   3/24/2014     Migraine                                                      Past Surgical History:    TOTAL ABDOMINAL HYSTERECTOMY W/ BILATERAL SALP*  1985          GALLBLADDER SURGERY                              2006          TONSILLECTOMY, ADENOIDECTOMY                                   OVARIAN CYST SURGERY                             1985          HYSTERECTOMY                                                   DILATION AND CURETTAGE OF UTERUS                               Review of patient's family history indicates:    Diabetes                       Sister                    Kidney disease                 Sister                    ALS                            Mother                      Comment: d.    Cancer                         Maternal Grandmother        Comment: d. colon    Cancer                         Paternal Grandfather        Comment: d. lung    Diabetes                       Maternal Aunt             Kidney disease                 Maternal Aunt             Diabetes    "                    Maternal Uncle            Amblyopia                      Neg Hx                    Blindness                      Neg Hx                    Cataracts                      Neg Hx                    Glaucoma                       Neg Hx                    Macular degeneration           Neg Hx                    Retinal detachment             Neg Hx                    Strabismus                     Neg Hx                      Social History    Marital Status:             Spouse Name:                       Years of Education:                 Number of children:               Occupational History    None on file    Social History Main Topics    Smoking Status: Never Smoker                      Smokeless Status: Never Used                        Alcohol Use: No              Drug Use: No              Sexual Activity: Not Currently           Birth Control/Protection: Abstinence    Other Topics            Concern    None on file    Social History Narrative    Single      Lives w/ sister  And brother     both are helping her during her post hosp recovery period        Allergies:  Review of patient's allergies indicates no known allergies.    Medications:  Current outpatient prescriptions:amitriptyline (ELAVIL) 10 MG tablet, TAKE 3 TABLETS BY MOUTH IN THE EVENING, Disp: 90 tablet, Rfl: 5;  aspirin (ECOTRIN) 81 MG EC tablet, Take 81 mg by mouth once daily., Disp: , Rfl: ;  BD INSULIN PEN NEEDLE UF SHORT 31 X 5/16 " Ndle, USE ONCE DAILY WITH LANTUS SOLOSTAR PEN INSULIN, Disp: 100 each, Rfl: 11  butalbital-acetaminophen-caffeine -40 mg (FIORICET, ESGIC) -40 mg per tablet, TAKE 1 TABLET EVERY 4 TO 6 HOURS, Disp: 30 tablet, Rfl: 0;  cyanocobalamin, vitamin B-12, (VITAMIN B-12) 2,500 mcg Subl, Place 2,500 mcg under the tongue once daily., Disp: , Rfl: ;  diphenhydrAMINE (BENADRYL) 25 mg capsule, Take 25 mg by mouth every 6 (six) hours as needed., Disp: , Rfl:   ferrous sulfate 325 (65 FE) MG " "EC tablet, Take 325 mg by mouth 3 (three) times daily with meals., Disp: , Rfl: ;  fluticasone (FLONASE) 50 mcg/actuation nasal spray, 1 SPRAY IN EACH NOSTRIL DAILY (Patient taking differently: 1 SPRAY IN EACH NOSTRIL DAILY PRN), Disp: 16 g, Rfl: 1;  furosemide (LASIX) 20 MG tablet, Take 1 tablet (20 mg total) by mouth once daily., Disp: 4 tablet, Rfl: 0  gemfibrozil (LOPID) 600 MG tablet, TAKE 1 TABLET BY MOUTH TWICE A DAY, Disp: 60 tablet, Rfl: 5;  (START ON 4/29/2015) hydrocodone-acetaminophen 10-325mg (NORCO)  mg Tab, Take 1 tablet by mouth every 6 (six) hours as needed., Disp: 120 tablet, Rfl: 0;  insulin lispro (HUMALOG) 100 unit/mL injection, Inject 7 Units into the skin 3 (three) times daily before meals., Disp: 6.3 mL, Rfl: 11  insulin syringe-needle U-100 (BD INSULIN SYRINGE ULTRA-FINE) 1/2 mL 31 x 15/64" Syrg, 1 Box by Misc.(Non-Drug; Combo Route) route 4 (four) times daily., Disp: 100 Syringe, Rfl: 12;  lancets (ONE TOUCH DELICA LANCETS) Misc, Ultra 2 lancets to check blood sugar BID, Disp: 100 each, Rfl: 6;  LANTUS SOLOSTAR 100 unit/mL (3 mL) InPn pen, , Disp: , Rfl: 3  LIDODERM 5 %(700 mg/patch), APPLY 1 PATCH TO SKIN DAILY (LEAVING ON FOR 12 HOURS AND OFF FOR 12 HOURS), Disp: 30 patch, Rfl: 6;  magnesium oxide (MAG-OX) 400 mg tablet, TAKE 1 TABLET (400 MG TOTAL) BY MOUTH 2 (TWO) TIMES DAILY., Disp: 60 tablet, Rfl: 11;  methocarbamol (ROBAXIN) 750 MG Tab, TAKE 1 TO 2 TABLETS BY MOUTH 3 TIMES A DAY (Patient taking differently: Take 2 tablets twice daily), Disp: 120 tablet, Rfl: 3  methocarbamol (ROBAXIN) 750 MG Tab, TAKE 1 TO 2 TABLETS BY MOUTH 3 TIMES A DAY, Disp: 120 tablet, Rfl: 3;  methylPREDNISolone (MEDROL DOSEPACK) 4 mg tablet, use as directed, Disp: 21 tablet, Rfl: 0;  multivitamin with minerals tablet, Take 1 tablet by mouth once daily., Disp: , Rfl: ;  pravastatin (PRAVACHOL) 40 MG tablet, TAKE 1 TABLET BY MOUTH EVERY DAY, Disp: 30 tablet, Rfl: 2  pyridoxine (B-6) 100 MG Tab, Take 1 " tablet (100 mg total) by mouth once daily., Disp: 30 tablet, Rfl: 12;  scopolamine (TRANSDERM-SCOP) 1.5 mg, Place 1 patch (1.5 mg total) onto the skin every 72 hours., Disp: 4 patch, Rfl: 0;  sulfamethoxazole-trimethoprim 800-160mg (BACTRIM DS) 800-160 mg Tab, Take 1 tablet by mouth 2 (two) times daily., Disp: , Rfl: 0  sumatriptan (IMITREX) 100 MG tablet, TAKE 1 TABLET (100 MG TOTAL) BY MOUTH ONCE., Disp: 9 tablet, Rfl: 6;  SYNTHROID 125 mcg tablet, TAKE 1 TABLET BY MOUTH DAILY, Disp: 90 tablet, Rfl: 4;  topiramate (TOPAMAX) 100 MG tablet, TAKE 1 TABLET (100 MG TOTAL) BY MOUTH 2 (TWO) TIMES DAILY., Disp: 60 tablet, Rfl: 11;  topiramate (TOPAMAX) 25 MG tablet, Take 4 tablets (100 mg total) by mouth 2 (two) times daily., Disp: 240 tablet, Rfl: 5  venlafaxine (EFFEXOR-XR) 150 MG Cp24, TAKE 1 CAPSULE BY MOUTH ONCE DAILY, Disp: 30 capsule, Rfl: 2;  vitamin D (VITAMIN D3) 185 MG Tab, Take 185 mg by mouth once daily., Disp: , Rfl:           Review of Systems   Constitutional: Negative.  Negative for chills and fever.   HENT: Negative for facial swelling and trouble swallowing.    Eyes: Negative for visual disturbance.   Respiratory: Negative for cough, chest tightness, shortness of breath and wheezing.    Cardiovascular: Negative for chest pain and palpitations.   Gastrointestinal: Negative for abdominal pain, constipation, nausea and vomiting.   Genitourinary: Positive for difficulty urinating and urgency. Negative for dysuria, hematuria and vaginal bleeding.        SPT draining well;   (+) bladder spasms     Musculoskeletal: Positive for gait problem and myalgias.   Skin: Negative for rash.   Neurological: Positive for headaches. Negative for dizziness.        Hx of Migraines     Hematological: Does not bruise/bleed easily.   Psychiatric/Behavioral: Negative for behavioral problems.       Objective:      Physical Exam   Nursing note and vitals reviewed.  Constitutional: She is oriented to person, place, and time. Vital  signs are normal. She appears well-developed and well-nourished. She is active and cooperative.   HENT:   Head: Normocephalic.   Right Ear: External ear normal.   Left Ear: External ear normal.   Nose: Nose normal.   Eyes: Conjunctivae and lids are normal. Right eye exhibits no discharge. Left eye exhibits no discharge. No scleral icterus.   Neck: Trachea normal and normal range of motion. Neck supple. No tracheal deviation present.   Cardiovascular: Normal rate, regular rhythm and intact distal pulses.    Pulmonary/Chest: Effort normal. No respiratory distress.   Abdominal: Soft. Normal appearance and bowel sounds are normal. She exhibits no distension and no mass. There is no tenderness. There is no rebound, no guarding and no CVA tenderness.       Musculoskeletal: Normal range of motion. She exhibits no edema.   Neurological: She is alert and oriented to person, place, and time.   Skin: Skin is warm, dry and intact.     Psychiatric: She has a normal mood and affect. Her behavior is normal. Judgment and thought content normal.       Assessment:       1. Neurogenic bladder    2. Bladder spasms    3. Chronic suprapubic catheter    4. Encounter for care or replacement of suprapubic tube    5. Granulation tissue        Plan:         I spent 30 minutes with the patient of which more than half was spent in direct consultation with the patient in regards to our treatment and plan.    Education and recommendations of today's plan of care including home remedies.  Spoke with MsShon Jessi about allowing our new NP Nithin Velazquez to exchange her SPT; she gave verbal consent.  Old SPT easily removed.   Silver nitrite sticks x 3 to granulation tissue.  Stoma prepped with betadine.   New 16fr SPT eaplaced; urine received.   Urine sent to lab for culture.  Irrigated the bladder.  Balloon inflated 9cc sterile water.  Tolerated well  Will contact regarding urine results and treatment prior to procedure.  RTC 4 weeks post botox  (04/25/2019)  Voiced understanding

## 2019-03-14 NOTE — PATIENT INSTRUCTIONS
Snacks can be an important part of a balanced, healthy meal plan. They allow you to eat more frequently, feeling full and satisfied throughout the day. Also, they allow you to spread carbohydrates evenly, which may stabilize blood sugars.  Plus, snacks are enjoyable!     The amount of carbohydrate needed at snacks varies. Generally, about 15-30 grams of carbohydrate per snack is recommended.  Below you will find some tasty treats.       0-5 gm carb   Crystal Light   Vitamin Water Zero   Herbal tea, unsweetened   2 tsp peanut butter on celery   1./2 cup sugar-free jell-o   1 sugar-free popsicle   ¼ cup blueberries   8oz Blue Anna unsweetened almond milk   5 baby carrots & celery sticks, cucumbers, bell peppers dipped in ¼ cup salsa, 2Tbsp light ranch dressing or 2Tbsp plain Greek yogurt   10 Goldfish crackers   ½ oz low-fat cheese or string cheese   1 closed handful of nuts, unsalted   1 Tbsp of sunflower seeds, unsalted   1 cup Smart Pop popcorn   1 whole grain brown rice cake        15 gm carb   1 small piece of fruit or ½ banana or 1/2 cup lite canned fruit   3 susana cracker squares   3 cups Smart Pop popcorn, top spray butter, Contreras lite salt or cinnamon and Truvia   5 Vanilla Wafers   ½ cup low fat, no added sugar ice cream or frozen yogurt (Blue bell, Blue Bunny, Weight Watchers, Skinny Cow)   ½ turkey, ham, or chicken sandwich   ½ c fruit with ½ c Cottage cheese   4-6 unsalted wheat crackers with 1 oz low fat cheese or 1 tbsp peanut butter    30-45 goldfish crackers (depending on flavor)    7-8 Christian mini brown rice cakes (caramel, apple cinnamon, chocolate)    12 Christian mini brown rice cakes (cheddar, bbq, ranch)    1/3 cup hummus dip with raw veg   1/2 whole wheat kena, 1Tbsp hummus   Mini Pizza (1/2 whole wheat English muffin, low-fat  cheese, tomato sauce)   100 calorie snack pack (Oreo, Chips Ahoy, Ritz Mix, Baked Cheetos)   4-6 oz. light or Greek Style yogurt  (Sharyn, Brynn, Liza, [facility])   ½ cup sugar-free pudding     6 in. wheat tortilla or kena oven toasted chips (topped with spray butter flavoring, cinnamon, Truvia OR spray butter, garlic powder, chili powder)    18 BBQ Popchips (available at Target, Whole Foods, Fresh Market)                   Diabetes Support Group Meetings         Date: Topic:   February 14 Eat Fit BASIA/Health Promotion   March 14 Taking Care of Your Smile   April 11 Spring into Healthy Eating/Cooking Demo   May 9 Ease Your Mind with Diabetes   Zaynab 13 Summer Treats/Cooking Demo   July 11 Eat Fit BASIA/Super Market Sweep   August 8 Taking Care of Your Eyes and Feet   Sept 12 Technology/ADA updates   October 10 Recipes & Treats/Cooking Demo   November 14 Heart Health/Pump it up!   December 12 Year-End Close Out        Meetings are held in the [facility] ([facility]) of the [facility] located at [address] [place] [address]. Please call [phone] for additional information.    Free service, offered every 2nd Thursday of every month! Family members and/or friends are welcome as well!  Support group is for patients with type 1 or type 2 diabetes.    From 3:30p to 4:30p

## 2019-03-16 LAB — BACTERIA UR CULT: NORMAL

## 2019-03-18 ENCOUNTER — TELEPHONE (OUTPATIENT)
Dept: UROLOGY | Facility: CLINIC | Age: 69
End: 2019-03-18

## 2019-03-18 DIAGNOSIS — N39.0 BACTERIAL UTI: Primary | ICD-10-CM

## 2019-03-18 DIAGNOSIS — A49.9 BACTERIAL UTI: Primary | ICD-10-CM

## 2019-03-18 RX ORDER — CEPHALEXIN 500 MG/1
500 CAPSULE ORAL EVERY 12 HOURS
Qty: 14 CAPSULE | Refills: 0 | Status: ON HOLD | OUTPATIENT
Start: 2019-03-18 | End: 2019-03-25 | Stop reason: SDUPTHER

## 2019-03-18 RX ORDER — PRAVASTATIN SODIUM 40 MG/1
TABLET ORAL
Qty: 90 TABLET | Refills: 3 | Status: SHIPPED | OUTPATIENT
Start: 2019-03-18 | End: 2019-07-31 | Stop reason: ALTCHOICE

## 2019-03-18 NOTE — PROGRESS NOTES
She is scheduled for Botox on 3/25/2019 with Dr. Whittington.  Chronic SPT.  Urine collected at recent exchange showing (+) bacteria.  Due to sensitivities, elevated Cr level will give   Keflex 500mg BID.

## 2019-03-18 NOTE — TELEPHONE ENCOUNTER
Patient notified that antibiotic sent  to her pharmacy to start taking.   She needs to drink plenty of water!

## 2019-03-22 ENCOUNTER — TELEPHONE (OUTPATIENT)
Dept: UROLOGY | Facility: CLINIC | Age: 69
End: 2019-03-22

## 2019-03-22 NOTE — TELEPHONE ENCOUNTER
Called pt to confirm 5:15am arrival time for procedure. Gave pt NPO instructions and gave pt opportunity to ask questions. Pt verbalized understanding.

## 2019-03-25 ENCOUNTER — TELEPHONE (OUTPATIENT)
Dept: UROLOGY | Facility: CLINIC | Age: 69
End: 2019-03-25

## 2019-03-25 ENCOUNTER — ANESTHESIA (OUTPATIENT)
Dept: SURGERY | Facility: HOSPITAL | Age: 69
End: 2019-03-25
Payer: MEDICARE

## 2019-03-25 ENCOUNTER — HOSPITAL ENCOUNTER (OUTPATIENT)
Facility: HOSPITAL | Age: 69
Discharge: HOME | End: 2019-03-25
Attending: UROLOGY | Admitting: UROLOGY
Payer: MEDICARE

## 2019-03-25 VITALS
SYSTOLIC BLOOD PRESSURE: 153 MMHG | OXYGEN SATURATION: 98 % | DIASTOLIC BLOOD PRESSURE: 77 MMHG | RESPIRATION RATE: 20 BRPM | WEIGHT: 289 LBS | HEIGHT: 68 IN | BODY MASS INDEX: 43.8 KG/M2 | TEMPERATURE: 98 F | HEART RATE: 79 BPM

## 2019-03-25 DIAGNOSIS — N39.0 BACTERIAL UTI: ICD-10-CM

## 2019-03-25 DIAGNOSIS — R33.9 URINARY RETENTION: Primary | ICD-10-CM

## 2019-03-25 DIAGNOSIS — N31.9 NEUROGENIC BLADDER: ICD-10-CM

## 2019-03-25 DIAGNOSIS — A49.9 BACTERIAL UTI: ICD-10-CM

## 2019-03-25 LAB
POCT GLUCOSE: 179 MG/DL (ref 70–110)
POCT GLUCOSE: 200 MG/DL (ref 70–110)

## 2019-03-25 PROCEDURE — D9220A PRA ANESTHESIA: ICD-10-PCS | Mod: CRNA,,, | Performed by: NURSE ANESTHETIST, CERTIFIED REGISTERED

## 2019-03-25 PROCEDURE — 37000009 HC ANESTHESIA EA ADD 15 MINS: Performed by: UROLOGY

## 2019-03-25 PROCEDURE — 37000008 HC ANESTHESIA 1ST 15 MINUTES: Performed by: UROLOGY

## 2019-03-25 PROCEDURE — 52287 CYSTOSCOPY CHEMODENERVATION: CPT | Mod: GC,,, | Performed by: UROLOGY

## 2019-03-25 PROCEDURE — 82962 GLUCOSE BLOOD TEST: CPT | Performed by: UROLOGY

## 2019-03-25 PROCEDURE — 25000003 PHARM REV CODE 250: Performed by: STUDENT IN AN ORGANIZED HEALTH CARE EDUCATION/TRAINING PROGRAM

## 2019-03-25 PROCEDURE — 63600175 PHARM REV CODE 636 W HCPCS: Performed by: NURSE ANESTHETIST, CERTIFIED REGISTERED

## 2019-03-25 PROCEDURE — 63600175 PHARM REV CODE 636 W HCPCS: Performed by: STUDENT IN AN ORGANIZED HEALTH CARE EDUCATION/TRAINING PROGRAM

## 2019-03-25 PROCEDURE — 52287 PR CYSTOURETHROSCOPY WITH INJ FOR CHEMODENERVATION: ICD-10-PCS | Mod: GC,,, | Performed by: UROLOGY

## 2019-03-25 PROCEDURE — D9220A PRA ANESTHESIA: ICD-10-PCS | Mod: ANES,,, | Performed by: ANESTHESIOLOGY

## 2019-03-25 PROCEDURE — 71000015 HC POSTOP RECOV 1ST HR: Performed by: UROLOGY

## 2019-03-25 PROCEDURE — D9220A PRA ANESTHESIA: Mod: CRNA,,, | Performed by: NURSE ANESTHETIST, CERTIFIED REGISTERED

## 2019-03-25 PROCEDURE — 36000707: Performed by: UROLOGY

## 2019-03-25 PROCEDURE — 71000044 HC DOSC ROUTINE RECOVERY FIRST HOUR: Performed by: UROLOGY

## 2019-03-25 PROCEDURE — 36000706: Performed by: UROLOGY

## 2019-03-25 PROCEDURE — 25000003 PHARM REV CODE 250: Performed by: UROLOGY

## 2019-03-25 PROCEDURE — D9220A PRA ANESTHESIA: Mod: ANES,,, | Performed by: ANESTHESIOLOGY

## 2019-03-25 RX ORDER — PROPOFOL 10 MG/ML
VIAL (ML) INTRAVENOUS
Status: DISCONTINUED | OUTPATIENT
Start: 2019-03-25 | End: 2019-03-25

## 2019-03-25 RX ORDER — CEPHALEXIN 500 MG/1
500 CAPSULE ORAL EVERY 12 HOURS
Qty: 6 CAPSULE | Refills: 0 | Status: SHIPPED | OUTPATIENT
Start: 2019-03-25 | End: 2019-03-28

## 2019-03-25 RX ORDER — ACETAMINOPHEN 325 MG/1
650 TABLET ORAL EVERY 6 HOURS PRN
Status: DISCONTINUED | OUTPATIENT
Start: 2019-03-25 | End: 2019-03-25 | Stop reason: HOSPADM

## 2019-03-25 RX ORDER — CEPHALEXIN 500 MG/1
500 CAPSULE ORAL EVERY 12 HOURS
Qty: 6 CAPSULE | Refills: 0 | Status: SHIPPED | OUTPATIENT
Start: 2019-03-25 | End: 2019-03-25 | Stop reason: SDUPTHER

## 2019-03-25 RX ORDER — MIDAZOLAM HYDROCHLORIDE 1 MG/ML
INJECTION, SOLUTION INTRAMUSCULAR; INTRAVENOUS
Status: DISCONTINUED | OUTPATIENT
Start: 2019-03-25 | End: 2019-03-25

## 2019-03-25 RX ORDER — FENTANYL CITRATE 50 UG/ML
INJECTION, SOLUTION INTRAMUSCULAR; INTRAVENOUS
Status: DISCONTINUED | OUTPATIENT
Start: 2019-03-25 | End: 2019-03-25

## 2019-03-25 RX ORDER — SODIUM CHLORIDE 9 MG/ML
INJECTION, SOLUTION INTRAVENOUS CONTINUOUS
Status: DISCONTINUED | OUTPATIENT
Start: 2019-03-25 | End: 2019-03-25 | Stop reason: HOSPADM

## 2019-03-25 RX ORDER — SODIUM CHLORIDE 0.9 % (FLUSH) 0.9 %
3 SYRINGE (ML) INJECTION
Status: DISCONTINUED | OUTPATIENT
Start: 2019-03-25 | End: 2019-03-25 | Stop reason: HOSPADM

## 2019-03-25 RX ORDER — LIDOCAINE HYDROCHLORIDE 20 MG/ML
JELLY TOPICAL
Status: DISCONTINUED | OUTPATIENT
Start: 2019-03-25 | End: 2019-03-25 | Stop reason: HOSPADM

## 2019-03-25 RX ORDER — DEXAMETHASONE SODIUM PHOSPHATE 4 MG/ML
INJECTION, SOLUTION INTRA-ARTICULAR; INTRALESIONAL; INTRAMUSCULAR; INTRAVENOUS; SOFT TISSUE
Status: DISCONTINUED | OUTPATIENT
Start: 2019-03-25 | End: 2019-03-25

## 2019-03-25 RX ORDER — PROPOFOL 10 MG/ML
VIAL (ML) INTRAVENOUS CONTINUOUS PRN
Status: DISCONTINUED | OUTPATIENT
Start: 2019-03-25 | End: 2019-03-25

## 2019-03-25 RX ADMIN — AMPICILLIN 1 G: 1 INJECTION, POWDER, FOR SOLUTION INTRAMUSCULAR; INTRAVENOUS at 06:03

## 2019-03-25 RX ADMIN — FENTANYL CITRATE 50 MCG: 50 INJECTION, SOLUTION INTRAMUSCULAR; INTRAVENOUS at 07:03

## 2019-03-25 RX ADMIN — GENTAMICIN SULFATE 160 MG: 40 INJECTION, SOLUTION INTRAMUSCULAR; INTRAVENOUS at 07:03

## 2019-03-25 RX ADMIN — PROPOFOL 150 MCG/KG/MIN: 10 INJECTION, EMULSION INTRAVENOUS at 07:03

## 2019-03-25 RX ADMIN — SODIUM CHLORIDE: 0.9 INJECTION, SOLUTION INTRAVENOUS at 06:03

## 2019-03-25 RX ADMIN — PROPOFOL 50 MG: 10 INJECTION, EMULSION INTRAVENOUS at 07:03

## 2019-03-25 RX ADMIN — MIDAZOLAM HYDROCHLORIDE 2 MG: 1 INJECTION, SOLUTION INTRAMUSCULAR; INTRAVENOUS at 07:03

## 2019-03-25 RX ADMIN — DEXAMETHASONE SODIUM PHOSPHATE 4 MG: 4 INJECTION, SOLUTION INTRAMUSCULAR; INTRAVENOUS at 07:03

## 2019-03-25 NOTE — PLAN OF CARE
The patient was  escorted to [facility] room 25 by Dianne VELASQUEZ. Urine sample obtained.  The patient is currently  changing into a hospital gown.

## 2019-03-25 NOTE — TRANSFER OF CARE
"Anesthesia Transfer of Care Note    Patient: Cecile Bowen    Procedure(s) Performed: Procedure(s) (LRB):  CYSTOSCOPY (N/A)  INJECTION, BOTULINUM TOXIN, TYPE A 300 UNITS (N/A)  EXCHANGE suprapubic catheter (N/A)    Patient location: PACU    Anesthesia Type: general    Transport from OR: Transported from OR on 6-10 L/min O2 by face mask with adequate spontaneous ventilation    Post pain: adequate analgesia    Post assessment: no apparent anesthetic complications and tolerated procedure well    Post vital signs: stable    Level of consciousness: awake, alert and oriented    Nausea/Vomiting: no nausea/vomiting    Complications: none    Transfer of care protocol was followed      Last vitals:   Visit Vitals  BP (!) 143/75 (BP Location: Left arm, Patient Position: Lying)   Pulse 99   Temp 36.6 °C (97.8 °F) (Oral)   Resp 20   Ht 5' 8" (1.727 m)   Wt 131.1 kg (289 lb)   SpO2 (!) 94%   Breastfeeding? No   BMI 43.94 kg/m²     "

## 2019-03-25 NOTE — INTERVAL H&P NOTE
The patient has been examined and the H&P has been reviewed:    I concur with the findings and no changes have occurred since H&P was written.    Anesthesia/Surgery risks, benefits and alternative options discussed and understood by patient/family.          Active Hospital Problems    Diagnosis  POA    Neurogenic bladder [N31.9]  Yes      Resolved Hospital Problems   No resolved problems to display.

## 2019-03-25 NOTE — DISCHARGE SUMMARY
[facility]  Discharge Note  Short Stay    Admit Date: 3/25/2019    Discharge Date and Time: 3/25/2019      Attending Physician: Ronel Whittington MD     Discharge Provider: Tom Pryor MD    Diagnoses:  Active Hospital Problems    Diagnosis  POA    *Neurogenic bladder [N31.9]  Yes    CKD (chronic kidney disease) stage 4, GFR 15-29 ml/min [N18.4]  Yes     [facility]      Chronic bilateral low back pain without sciatica [M54.5, G89.29]  Yes    Hydronephrosis [N13.30]  Yes    Chronic pain [G89.29]  Yes    Osteopenia [M85.80]  Yes    Uncontrolled type 2 diabetes mellitus with diabetic neuropathy, without long-term current use of insulin [E11.40, E11.65]  Yes    Chronic suprapubic catheter [Z93.59]  Not Applicable    Uncontrolled type 2 diabetes mellitus with chronic kidney disease, with long-term current use of insulin [E11.22, E11.65, Z79.4]  Not Applicable    Secondary hyperparathyroidism, renal [N25.81]  Yes    Major depressive disorder, recurrent episode, moderate [F33.1]  Yes    Insomnia [G47.00]  Yes    Metabolic acidosis [E87.2]  Yes    Urinary retention [R33.9]  Yes    Encounter for care or replacement of suprapubic tube [Z43.5]  Not Applicable    Primary osteoarthritis involving multiple joints [M15.0]  Yes    Iron deficiency anemia [D50.9]  Yes    Anemia [D64.9]  Yes    Recurrent UTI [N39.0]  Yes    Osteoarthritis of lumbar spine [M47.816]  Yes    Mobility impaired [Z74.09]  Yes    Abnormality of gait and mobility [R26.9]  Yes    Falls frequently [R29.6]  Not Applicable    Dizziness and giddiness [R42]  Yes    Nuclear sclerosis - Both Eyes [H25.10]  Yes    VIJAYA (obstructive sleep apnea) [G47.33]  Yes    Vitamin D deficiency disease [E55.9]  Yes    Hyperlipidemia [E78.5]  Yes    Intractable chronic migraine without aura [G43.719]  Yes    Chronic rhinitis [J31.0]  Yes     Chronic    Fibromyalgia [M79.7]  Yes    Hypertension [I10]  Yes    Hypothyroidism  "[E03.9]  Yes      Resolved Hospital Problems   No resolved problems to display.       Discharged Condition: good    Hospital Course: Patient was admitted for a cystoscopy with botox injection and suprapubic catheter exchange and tolerated the procedure well with no complications.    Final Diagnoses: Same as principal problem.    Disposition: Home or Self Care    Follow up/Patient Instructions:    Medications:  Reconciled Home Medications:      Medication List      CONTINUE taking these medications    AIMOVIG AUTOINJECTOR (2 PACK) 70 mg/mL Atin  Generic drug:  erenumab-aooe  Inject 2 mLs (140 mg total) into the skin every 28 days.     BD INSULIN SYRINGE ULTRA-FINE 1/2 mL 31 gauge x 15/64" Syrg  Generic drug:  insulin syringe-needle U-100  USE WITH INSULIN 4 TIMES A DAY     blood sugar diagnostic Strp  ONE TOUCH ULTRA TEST STRIPS//Check blood sugars QID     blood-glucose meter kit  ONE TOUCH ULTRA     butalbital-acetaminophen-caffeine -40 mg -40 mg per tablet  Commonly known as:  FIORICET, ESGIC  TAKE 1 TABLET BY MOUTH WHEN NOT CONTROLLED BY OTHER MEDS. NO MORE THAN 10 TABS PER 30 DAYS     cephALEXin 500 MG capsule  Commonly known as:  KEFLEX  Take 1 capsule (500 mg total) by mouth every 12 (twelve) hours. for 3 days     cloNIDine 0.1 MG tablet  Commonly known as:  CATAPRES  Take 1 tablet (0.1 mg total) by mouth once. for 1 dose     ergocalciferol 50,000 unit Cap  Commonly known as:  VITAMIN D2  TAKE ONE CAPSULE BY MOUTH ONE TIME PER WEEK     ferrous sulfate 325 (65 FE) MG EC tablet  Take 325 mg by mouth once daily.     gemfibrozil 600 MG tablet  Commonly known as:  LOPID  TAKE 1 TABLET BY MOUTH EVERY OTHER DAY     HYDROcodone-acetaminophen  mg per tablet  Commonly known as:  NORCO  Take 1 tablet by mouth every 6 (six) hours as needed.     insulin glargine 100 units/mL (3mL) SubQ pen  Commonly known as:  LANTUS SOLOSTAR U-100 INSULIN  Inject 27 units daily.     insulin lispro 100 unit/mL " "injection  Commonly known as:  HumaLOG U-100 Insulin  Inject 7 units w/ breakfast, 10 units w/ lunch and dinner plus scale 180-230+2, 231-280+4, 281-330+6, 331-380+8, >380 +10.     lancets Misc  Ultra 2 lancets to check blood sugar BID     magnesium oxide 400 mg (241.3 mg magnesium) tablet  Commonly known as:  MAG-OX  TAKE 1 TABLET (400 MG TOTAL) BY MOUTH 2 (TWO) TIMES DAILY.     methocarbamol 750 MG Tab  Commonly known as:  ROBAXIN  Take 1-2 tablets (750-1,500 mg total) by mouth 3 (three) times daily as needed.     multivitamin with minerals tablet  Take 1 tablet by mouth once daily.     oxybutynin 10 MG 24 hr tablet  Commonly known as:  DITROPAN-XL  TAKE 1 TABLET (10 MG TOTAL) BY MOUTH ONCE DAILY.     pen needle, diabetic 31 gauge x 5/16" Ndle  Commonly known as:  BD ULTRA-FINE SHORT PEN NEEDLE  Uses w/ lantus daily. 90 day     pravastatin 40 MG tablet  Commonly known as:  PRAVACHOL  TAKE 1 TABLET BY MOUTH EVERY DAY     riboflavin (vitamin B2) 400 mg Tab  Take 400 mg by mouth once daily.     scopolamine 1.3-1.5 mg (1 mg over 3 days)  Commonly known as:  TRANSDERM-SCOP  Place 1 patch onto the skin every 72 hours.     sodium bicarbonate 650 MG tablet  TAKE 1 TABLET (650 MG TOTAL) BY MOUTH 3 (THREE) TIMES DAILY.     * sumatriptan 100 MG tablet  Commonly known as:  IMITREX  Take 1 tablet (100 mg total) by mouth 2 (two) times daily as needed for Migraine.     * sumatriptan 100 MG tablet  Commonly known as:  IMITREX  TAKE 1 TABLET (100 MG TOTAL) BY MOUTH 2 (TWO) TIMES DAILY AS NEEDED FOR MIGRAINE.     SYNTHROID 50 MCG tablet  Generic drug:  levothyroxine  TAKE 1 TABLET (50 MCG TOTAL) BY MOUTH ONCE DAILY.     topiramate 200 MG Tab  Commonly known as:  TOPAMAX  TAKE 1 TABLET BY MOUTH TWICE A DAY     traZODone 100 MG tablet  Commonly known as:  DESYREL  TAKE 1 TABLET BY MOUTH AT BEDTIME MAY TAKE AN EXTRA HALF TABLET IF NEEDED     venlafaxine 75 MG 24 hr capsule  Commonly known as:  EFFEXOR-XR  Take 2 capsules (150 mg " total) by mouth once daily.     VITAMIN B-12 5,000 mcg Subl  Generic drug:  cyanocobalamin (vitamin B-12)  Place 1 tablet under the tongue once daily.         * This list has 2 medication(s) that are the same as other medications prescribed for you. Read the directions carefully, and ask your doctor or other care provider to review them with you.              Discharge Procedure Orders   Call MD for:  temperature >100.4     Call MD for:  persistent nausea and vomiting or diarrhea     Call MD for:  severe uncontrolled pain     Call MD for:  redness, tenderness, or signs of infection (pain, swelling, redness, odor or green/yellow discharge around incision site)     Call MD for:  difficulty breathing or increased cough     Call MD for:  severe persistent headache     Call MD for:  worsening rash     Call MD for:  persistent dizziness, light-headedness, or visual disturbances     Call MD for:  increased confusion or weakness     No dressing needed     Follow-up Information     Tesha Ken NP In 4 weeks.    Specialties:  Family Medicine, Urology  Why:  post op cysto botox 300 U, needs SP tube exchange   Contact information:  David TAVARES  [facility] [id number] [phone]

## 2019-03-25 NOTE — PLAN OF CARE
Patient assigned to this writer by charge nurse. The patient is in the waiting room. This writer is completing a chart review and reviewing MD orders.

## 2019-03-25 NOTE — DISCHARGE INSTRUCTIONS
Cystoscopy    Cystoscopy is a procedure that lets your doctor look directly inside your urethra and bladder. It can be used to:  · Help diagnose a problem with your urethra, bladder, or kidneys.  · Take a sample (biopsy) of bladder or urethral tissue.  · Treat certain problems (such as removing kidney stones).  · Place a stent to bypass an obstruction.  · Take special X-rays of the kidneys.  Based on the findings, your doctor may recommend other tests or treatments.  What is a cystoscope?  A cystoscope is a telescope-like instrument that contains lenses and fiberoptics (small glass wires that make bright light). The cystoscope may be straight and rigid, or flexible to bend around curves in the urethra. The doctor may look directly into the cystoscope, or project the image onto a monitor.  Getting ready  · Ask your doctor if you should stop taking any medicines before the procedure.  · Ask whether you should avoid eating or drinking anything after midnight before the procedure.  · Follow any other instructions your doctor gives you.  Tell your doctor before the exam if you:  · Take any medicines, such as aspirin or blood thinners  · Have allergies to any medicines  · Are pregnant   The procedure  Cystoscopy is done in the doctors office, surgery center, or hospital. The doctor and a nurse are present during the procedure. It takes only a few minutes, longer if a biopsy, X-ray, or treatment needs to be done.  During the procedure:  · You lie on an exam table on your back, knees bent and legs apart. You are covered with a drape.  · Your urethra and the area around it are washed. Anesthetic jelly may be applied to numb the urethra. Other pain medicine is usually not needed. In some cases, you may be offered a mild sedative to help you relax. If a more extensive procedure is to be done, such as a biopsy or kidney stone removal, general anesthesia may be needed.  · The cystoscope is inserted. A sterile fluid is put  into the bladder to expand it. You may feel pressure from this fluid.  · When the procedure is done, the cystoscope is removed.  After the procedure  If you had a sedative, general anesthesia, or spinal anesthesia, you must have someone drive you home. Once youre home:  · Drink plenty of fluids.  · You may have burning or light bleeding when you urinate--this is normal.  · Medicines may be prescribed to ease any discomfort or prevent infection. Take these as directed.  · Call your doctor if you have heavy bleeding or blood clots, burning that lasts more than a day, a fever over 100°F  (38° C), or trouble urinating.

## 2019-03-25 NOTE — ANESTHESIA POSTPROCEDURE EVALUATION
Anesthesia Post Evaluation    Patient: Cecile Bowen    Procedure(s) Performed: Procedure(s) (LRB):  CYSTOSCOPY (N/A)  INJECTION, BOTULINUM TOXIN, TYPE A 300 UNITS (N/A)  EXCHANGE suprapubic catheter (N/A)    Final Anesthesia Type: general  Patient location during evaluation: PACU  Patient participation: Yes- Able to Participate  Level of consciousness: awake and alert and awake  Post-procedure vital signs: reviewed and stable  Pain management: adequate  Airway patency: patent  PONV status at discharge: No PONV  Anesthetic complications: no      Cardiovascular status: blood pressure returned to baseline  Respiratory status: unassisted and spontaneous ventilation  Hydration status: euvolemic  Follow-up not needed.          Vitals Value Taken Time   /93 3/25/2019  7:36 AM   Temp 36.5 °C (97.7 °F) 3/25/2019  7:36 AM   Pulse 79 3/25/2019  7:36 AM   Resp 20 3/25/2019  7:36 AM   SpO2 98 % 3/25/2019  7:36 AM         No case tracking events are documented in the log.      Pain/Audrey Score: Audrey Score: 10 (3/25/2019  8:26 AM)

## 2019-03-25 NOTE — OP NOTE
[facility]  Operative Note    Date: 03/25/2019    Pre-Op Diagnosis: neurogenic bladder, hydronephrosis    Patient Active Problem List    Diagnosis Date Noted    Cervicogenic migraine 02/01/2019    CKD (chronic kidney disease) stage 4, GFR 15-29 ml/min 01/20/2019    Chronic bilateral low back pain without sciatica 05/16/2018    Weakness generalized 05/16/2018    Hydronephrosis 05/14/2018    Chronic pain 03/15/2018    Lumbar spondylosis 02/20/2018    Long term prescription opiate use 01/26/2017    Medication overuse headache 01/20/2017    Chronic daily headache 01/20/2017    Special screening for malignant neoplasms, colon 01/13/2017    Osteopenia 01/09/2017    Morbid obesity due to excess calories 01/09/2017    Pulmonary nodule 11/10/2016    Uncontrolled type 2 diabetes mellitus with diabetic neuropathy, without long-term current use of insulin 09/19/2016    Chronic suprapubic catheter 09/19/2016    Uncontrolled type 2 diabetes mellitus with chronic kidney disease, with long-term current use of insulin 07/06/2016    Secondary hyperparathyroidism, renal 03/22/2016    Major depressive disorder, recurrent episode, moderate 03/10/2016    Insomnia 03/10/2016    Mixed migraine and muscle contraction headache 03/10/2016    Neurogenic bladder 12/14/2015    Metabolic acidosis 12/07/2015    Urinary retention 11/10/2015    Encounter for care or replacement of suprapubic tube 10/06/2015    Primary osteoarthritis involving multiple joints 09/15/2015    Iron deficiency anemia 03/16/2015    Enlarged lymph node 03/12/2015    Anemia 03/12/2015    Recurrent UTI 02/24/2015    Osteoarthritis of lumbar spine 02/20/2015    Abnormality of gait and mobility 10/29/2014    Mobility impaired 10/29/2014    Falls frequently 09/30/2014    Dizziness and giddiness 09/22/2014    Nuclear sclerosis - Both Eyes 03/24/2014    VIJAYA (obstructive sleep apnea) 03/17/2014    Vitamin D deficiency disease  03/11/2014    Hyperlipidemia 03/11/2014    Intractable chronic migraine without aura 08/27/2013    Chronic rhinitis 03/23/2013    Fibromyalgia 12/17/2012    Hypertension 12/12/2012    Hypothyroidism 12/12/2012       Post-Op Diagnosis: same    Procedure(s) Performed:   1.  Cystoscopy with bladder botox injection    Specimen(s): none    Staff Surgeon:  Ronel Whittington MD    Assistant Surgeon: Tom Pryor MD; Terri Wan MD    Anesthesia: Monitored Local Anesthesia with Sedation    Indications: Cecile Bowen is a 67 y.o. female with neurogenic bladder, bilateral Gr III-IV reflux, hx bilateral hydroneprhosis, managed with SPT and botox injection. Last injection 10/8/2018    Findings:   Normal cystoscopy, small area of catheter irritation around SP tube tract   300u in 30cc injected into bladder detrusor.     Estimated Blood Loss: min    Drains: none    Procedure in Detail:  After informed consent was obtained the patient was brought to the cystoscopy suite and placed in the supine position.  SCDs were applied and working.  Anesthesia was administered.  When the patient was adequately sedated she was placed in the dorsal lithotomy position and prepped and draped in the usual sterile fashion.      A rigid cystoscope in a 22 Fr sheath was introduced into the patients's bladder via the urethra.  This passed easily.  Formal cystoscopy was performed which revealed the ureteral orifices in their normal anatomic position bilaterally.  No bladder masses, trabeculations, stones or diverticuli were seen. Catheter irritation was noted in the dome of the bladder near the SP tube balloon.      300U units of botox in 30cc was injected into the detrusor muscle throughout the bladder.  Good wheals were raised.  The patient's bladder was drained and the cystoscope was removed.     The patient tolerated the procedure well and was transferred to the recovery room in stable condition.      Disposition:  The patient  will follow up with Tesha Ken in four weeks for SP tube exchange. She will be discharged on keflex for three days.     Tom Pryor MD

## 2019-03-25 NOTE — PLAN OF CARE
Discharge instructions reviewed with patient, verbalization of understanding. Prescriptions given to family along with discharge instructions. Pt chronic pain in back tolerable. Tolerated PO fluids without any s/s of nausea  or vomiting. VSS. Pt ready for discharge home.

## 2019-03-28 RX ORDER — BUTALBITAL, ACETAMINOPHEN AND CAFFEINE 50; 325; 40 MG/1; MG/1; MG/1
TABLET ORAL
Qty: 10 TABLET | Refills: 0 | Status: SHIPPED | OUTPATIENT
Start: 2019-03-28 | End: 2019-05-20 | Stop reason: SDUPTHER

## 2019-04-02 ENCOUNTER — PATIENT MESSAGE (OUTPATIENT)
Dept: ADMINISTRATIVE | Facility: OTHER | Age: 69
End: 2019-04-02

## 2019-04-02 ENCOUNTER — PATIENT MESSAGE (OUTPATIENT)
Dept: PHYSICAL MEDICINE AND REHAB | Facility: CLINIC | Age: 69
End: 2019-04-02

## 2019-04-02 DIAGNOSIS — G89.29 CHRONIC NECK PAIN: ICD-10-CM

## 2019-04-02 DIAGNOSIS — M54.50 CHRONIC MIDLINE LOW BACK PAIN WITHOUT SCIATICA: ICD-10-CM

## 2019-04-02 DIAGNOSIS — M79.7 FIBROMYALGIA: ICD-10-CM

## 2019-04-02 DIAGNOSIS — M54.2 CHRONIC NECK PAIN: ICD-10-CM

## 2019-04-02 DIAGNOSIS — G89.29 CHRONIC MIDLINE LOW BACK PAIN WITHOUT SCIATICA: ICD-10-CM

## 2019-04-02 RX ORDER — HYDROCODONE BITARTRATE AND ACETAMINOPHEN 10; 325 MG/1; MG/1
1 TABLET ORAL EVERY 6 HOURS PRN
Qty: 120 TABLET | Refills: 0 | Status: SHIPPED | OUTPATIENT
Start: 2019-04-02 | End: 2019-05-01 | Stop reason: SDUPTHER

## 2019-04-04 ENCOUNTER — TELEPHONE (OUTPATIENT)
Dept: PHARMACY | Facility: CLINIC | Age: 69
End: 2019-04-04

## 2019-04-04 NOTE — TELEPHONE ENCOUNTER
refill call for aimovig. will ship 4/8 to arrive 4/9 with patient consent.  copay $0. Patient has 0 doses remaining next dose due 4/14

## 2019-04-05 RX ORDER — LEVOTHYROXINE SODIUM 50 UG/1
TABLET ORAL
Qty: 30 TABLET | Refills: 6 | Status: SHIPPED | OUTPATIENT
Start: 2019-04-05 | End: 2020-02-12

## 2019-04-25 DIAGNOSIS — Z12.39 BREAST CANCER SCREENING: ICD-10-CM

## 2019-04-30 ENCOUNTER — TELEPHONE (OUTPATIENT)
Dept: PHARMACY | Facility: CLINIC | Age: 69
End: 2019-04-30

## 2019-04-30 ENCOUNTER — PATIENT MESSAGE (OUTPATIENT)
Dept: PHYSICAL MEDICINE AND REHAB | Facility: CLINIC | Age: 69
End: 2019-04-30

## 2019-04-30 NOTE — TELEPHONE ENCOUNTER
Refill and new formulation consult call for Aimovig 140mg syringe. No answer, no voicemail available. Sent [employer] message.    David Santos, PharmD  Clinical Pharmacist  [facility]  P: [phone]

## 2019-05-01 ENCOUNTER — OFFICE VISIT (OUTPATIENT)
Dept: UROLOGY | Facility: CLINIC | Age: 69
End: 2019-05-01
Payer: MEDICARE

## 2019-05-01 VITALS
DIASTOLIC BLOOD PRESSURE: 90 MMHG | HEART RATE: 101 BPM | HEIGHT: 68 IN | WEIGHT: 285 LBS | SYSTOLIC BLOOD PRESSURE: 143 MMHG | BODY MASS INDEX: 43.19 KG/M2

## 2019-05-01 DIAGNOSIS — M54.50 CHRONIC MIDLINE LOW BACK PAIN WITHOUT SCIATICA: ICD-10-CM

## 2019-05-01 DIAGNOSIS — M54.2 CHRONIC NECK PAIN: ICD-10-CM

## 2019-05-01 DIAGNOSIS — N31.9 NEUROGENIC BLADDER: Primary | ICD-10-CM

## 2019-05-01 DIAGNOSIS — Z93.59 CHRONIC SUPRAPUBIC CATHETER: ICD-10-CM

## 2019-05-01 DIAGNOSIS — G89.29 CHRONIC NECK PAIN: ICD-10-CM

## 2019-05-01 DIAGNOSIS — M79.7 FIBROMYALGIA: ICD-10-CM

## 2019-05-01 DIAGNOSIS — Z43.5 ENCOUNTER FOR SUPRAPUBIC CATHETER CARE: ICD-10-CM

## 2019-05-01 DIAGNOSIS — G89.29 CHRONIC MIDLINE LOW BACK PAIN WITHOUT SCIATICA: ICD-10-CM

## 2019-05-01 DIAGNOSIS — N32.89 BLADDER SPASMS: ICD-10-CM

## 2019-05-01 PROCEDURE — 99999 PR PBB SHADOW E&M-EST. PATIENT-LVL III: ICD-10-PCS | Mod: PBBFAC,,, | Performed by: NURSE PRACTITIONER

## 2019-05-01 PROCEDURE — 51705 PR CHANGE OF BLADDER TUBE,SIMPLE: ICD-10-PCS | Mod: S$PBB,,, | Performed by: NURSE PRACTITIONER

## 2019-05-01 PROCEDURE — 99214 OFFICE O/P EST MOD 30 MIN: CPT | Mod: S$PBB,25,, | Performed by: NURSE PRACTITIONER

## 2019-05-01 PROCEDURE — 51705 CHANGE OF BLADDER TUBE: CPT | Mod: S$PBB,,, | Performed by: NURSE PRACTITIONER

## 2019-05-01 PROCEDURE — 99214 PR OFFICE/OUTPT VISIT, EST, LEVL IV, 30-39 MIN: ICD-10-PCS | Mod: S$PBB,25,, | Performed by: NURSE PRACTITIONER

## 2019-05-01 PROCEDURE — 99999 PR PBB SHADOW E&M-EST. PATIENT-LVL III: CPT | Mod: PBBFAC,,, | Performed by: NURSE PRACTITIONER

## 2019-05-01 PROCEDURE — 51705 CHANGE OF BLADDER TUBE: CPT | Mod: PBBFAC | Performed by: NURSE PRACTITIONER

## 2019-05-01 PROCEDURE — 99213 OFFICE O/P EST LOW 20 MIN: CPT | Mod: PBBFAC | Performed by: NURSE PRACTITIONER

## 2019-05-01 RX ORDER — HYDROCODONE BITARTRATE AND ACETAMINOPHEN 10; 325 MG/1; MG/1
1 TABLET ORAL EVERY 6 HOURS PRN
Qty: 120 TABLET | Refills: 0 | Status: SHIPPED | OUTPATIENT
Start: 2019-05-02 | End: 2019-05-27 | Stop reason: SDUPTHER

## 2019-05-01 RX ORDER — METHOCARBAMOL 750 MG/1
750-1500 TABLET, FILM COATED ORAL 3 TIMES DAILY PRN
Qty: 180 TABLET | Refills: 3 | Status: SHIPPED | OUTPATIENT
Start: 2019-05-02 | End: 2019-10-03 | Stop reason: SDUPTHER

## 2019-05-01 NOTE — PROGRESS NOTES
CHIEF COMPLAINT:    Mrs Bowen is a 68 y.o. female presenting for SPT change.  PRESENTING ILLNESS:    Cecile Bowen is a 68 y.o. female with a PMH of neurogenic bladder who presents for SPT change. Her last clinic visit was 3/14/19 with JACKY Ken NP.    Diabetic Female with history of bilateral hydronephrosis, incomplete bladder emptying which is managed by SPT (16fr).  S/p Cystoscopy with bladder botox injection (300u) 3/25/19 with Dr. Whittington.     Her last SPT change was 3/25/19.    Here today for scheduled SPT change.  Bladder spasm improved with Botox injection.    Denies hematuria or flank pain  Denies fever, chills, nausea or vomiting  Pt keeps catheter plugged and empties throughout the day. At home she uses large drainage bag.     REVIEW OF SYSTEMS:    Review of Systems    Constitutional: Negative for fever and chills.   HENT: Negative for hearing loss.   Eyes: Negative for visual disturbance.   Respiratory: Negative for shortness of breath.   Cardiovascular: Negative for chest pain.   Gastrointestinal: Negative for nausea, vomiting, and constipation.   Genitourinary:  Positive difficulty voiding and bladder spasms. Negative for hematuria or flank pain.   Neurological: Negative for dizziness.   Hematological: Does not bruise/bleed easily.   Psychiatric/Behavioral: Negative for confusion.     PATIENT HISTORY:    Past Medical History:   Diagnosis Date    CKD (chronic kidney disease) stage 4, GFR 15-29 ml/min     [facility]    Hypertension 12/12/2012    Hypothyroidism 12/12/2012    Levoscoliosis     Migraine     Nuclear sclerosis - Both Eyes 3/24/2014    Obesity 12/12/2012       Past Surgical History:   Procedure Laterality Date    BLOCK-NERVE GENITOFEMORAL Left 2/20/2018    [facility] PAIN MGT    BLOCK-NERVE-MEDIAL BRANCH-LUMBAR Bilateral 3/15/2018    Performed by [facility] at [facility] PAIN T    BLOCK-NERVE-MEDIAL BRANCH-LUMBAR Bilateral 2/20/2018     [facility]    BREAST BIOPSY Right     benign    CHOLECYSTECTOMY      COLONOSCOPY N/A 1/13/2017    Performed by [facility]eman [facility] at [facility] ENDO (4TH FLR)    CYSTOSCOPY N/A 3/25/2019    Performed by [facility]ton MD at [facility] OR 1ST FLR    CYSTOSCOPY N/A 10/8/2018    Performed by [facility] at [facility] OR 1ST FLR    CYSTOSCOPY N/A 5/14/2018    Performed by [facility] at [facility] OR 1ST FLR    CYSTOSCOPY N/A 12/14/2015    Performed by [facility] at [facility] OR 1ST FLR    CYSTOSCOPY N/A 5/29/2015    Performed by [facility] at [facility] OR 2ND FLR    CYSTOSCOPY N/A 3/23/2015    Performed by [facility] at [facility] OR 1ST FLR    DILATION AND CURETTAGE OF UTERUS      EXCHANGE suprapubic catheter N/A 3/25/2019    Performed by [facility] at [facility] OR 1ST FLR    FLUORO URODYNAMIC STUDY (FUDS) N/A 3/23/2015    Performed by [facility]ton MD at [facility] OR 1ST FLR    GALLBLADDER SURGERY  2006    INJECTION, BOTULINUM TOXIN, TYPE A 300 UNITS N/A 3/25/2019    Performed by [facility]ton [facility] at [facility] OR 1ST FLR    INJECTION, BOTULINUM TOXIN, TYPE A 300 UNITS N/A 10/8/2018    Performed by [facility]ton MD at [facility] OR 1ST FLR    INJECTION-BOTOX N/A 5/14/2018    Performed by [facility]ton [facility] at [facility] OR 1ST FLR    INJECTION-BOTOX N/A 12/14/2015    Performed by [facility] at [facility] OR 1ST FLR    INJECTION-BOTOX N/A 5/29/2015    Performed by [facility]ton MD at [facility] OR 2ND FLR    INSERTION-CATHETER-SUPRAPUBIC CHANGE N/A 5/14/2018    Performed by [facility] at [facility] OR 1ST FLR    INSERTION-SUPRAPUBIC TUBE-OPEN N/A 5/29/2015    Performed by [facility]ton MD at [facility] OR 2ND FLR    OVARIAN CYST SURGERY  1985    RADIOFREQUENCY THERMOCOAGULATION (RFTC)-NERVE-MEDIAN BRANCH-LUMBAR Left 4/20/2018    Performed by [facility] at [facility]  PAIN MGT    RADIOFREQUENCY THERMOCOAGULATION (RFTC)-NERVE-MEDIAN BRANCH-LUMBAR Right 4/2/2018    Performed by April [facility] at [facility] [place] PAIN MGT    spt placement      TONSILLECTOMY, ADENOIDECTOMY      TOTAL ABDOMINAL HYSTERECTOMY W/ BILATERAL SALPINGOOPHORECTOMY  1985       Family History   Problem Relation Age of Onset    Diabetes Sister     Kidney disease Sister         CKD III    ALS Mother         d.    Cancer Maternal Grandmother         d. colon    Cancer Paternal Grandfather         d. lung    Scoliosis Brother         increased pain    Prostate cancer Brother         cured s/p surgery    Diabetes Maternal Aunt     Kidney disease Maternal Aunt     Diabetes Maternal Uncle     Amblyopia Neg Hx     Blindness Neg Hx     Cataracts Neg Hx     Glaucoma Neg Hx     Macular degeneration Neg Hx     Retinal detachment Neg Hx     Strabismus Neg Hx        Social History     Socioeconomic History    Marital status:      Spouse name: Not on file    Number of children: Not on file    Years of education: Not on file    Highest education level: Not on file   Occupational History    Occupation: teacher     Comment: retired   Social Needs    Financial resource strain: Not on file    Food insecurity:     Worry: Not on file     Inability: Not on file    Transportation needs:     Medical: Not on file     Non-medical: Not on file   Tobacco Use    Smoking status: Never Smoker    Smokeless tobacco: Never Used   Substance and Sexual Activity    Alcohol use: No     Alcohol/week: 0.0 oz    Drug use: No    Sexual activity: Not Currently     Birth control/protection: Abstinence   Lifestyle    Physical activity:     Days per week: Not on file     Minutes per session: Not on file    Stress: Not on file   Relationships    Social connections:     Talks on phone: Not on file     Gets together: Not on file     Attends [employer] service: Not on file     Active member of club or organization: Not on  "file     Attends meetings of clubs or organizations: Not on file     Relationship status: Not on file   Other Topics Concern    Not on file   Social History Narrative    Single ()        Lives w/ sister and brother. Pt needs to keep her narcotics hidden from her brother who will steal them            Allergies:  Patient has no known allergies.    Medications:    Current Outpatient Medications:     BD INSULIN SYRINGE ULTRA-FINE 1/2 mL 31 gauge x 15/64" Syrg, USE WITH INSULIN 4 TIMES A DAY, Disp: 100 Syringe, Rfl: 12    blood sugar diagnostic Strp, ONE TOUCH ULTRA TEST STRIPS//Check blood sugars QID, Disp: 200 strip, Rfl: 6    blood-glucose meter kit, ONE TOUCH ULTRA, Disp: 1 each, Rfl: 0    butalbital-acetaminophen-caffeine -40 mg (FIORICET, ESGIC) -40 mg per tablet, TAKE 1 TABLET BY MOUTH WHEN NOT CONTROLLED BY OTHER MEDS. NO MORE THAN 10 TABS PER 30 DAYS, Disp: 10 tablet, Rfl: 0    cyanocobalamin, vitamin B-12, (VITAMIN B-12) 5,000 mcg Subl, Place 1 tablet under the tongue once daily., Disp: , Rfl:     erenumab-aooe 140 mg/mL AtIn, Inject 1 syringe (140 mg total) into the skin every 28 days., Disp: 1 mL, Rfl: 2    ergocalciferol (VITAMIN D2) 50,000 unit Cap, TAKE ONE CAPSULE BY MOUTH ONE TIME PER WEEK, Disp: 12 capsule, Rfl: 3    ferrous sulfate 325 (65 FE) MG EC tablet, Take 325 mg by mouth once daily. , Disp: , Rfl:     gemfibrozil (LOPID) 600 MG tablet, TAKE 1 TABLET BY MOUTH EVERY OTHER DAY, Disp: 60 tablet, Rfl: 2    HYDROcodone-acetaminophen (NORCO)  mg per tablet, Take 1 tablet by mouth every 6 (six) hours as needed., Disp: 120 tablet, Rfl: 0    insulin (LANTUS SOLOSTAR U-100 INSULIN) glargine 100 units/mL (3mL) SubQ pen, Inject 27 units daily., Disp: 15 mL, Rfl: 6    insulin lispro (HUMALOG U-100 INSULIN) 100 unit/mL injection, Inject 7 units w/ breakfast, 10 units w/ lunch and dinner plus scale 180-230+2, 231-280+4, 281-330+6, 331-380+8, >380 +10., Disp: 20 mL, Rfl: " "6    lancets Misc, Ultra 2 lancets to check blood sugar BID, Disp: 100 each, Rfl: 6    magnesium oxide (MAG-OX) 400 mg (241.3 mg magnesium) tablet, TAKE 1 TABLET (400 MG TOTAL) BY MOUTH 2 (TWO) TIMES DAILY., Disp: 180 tablet, Rfl: 1    methocarbamol (ROBAXIN) 750 MG Tab, Take 1-2 tablets (750-1,500 mg total) by mouth 3 (three) times daily as needed., Disp: 180 tablet, Rfl: 3    multivitamin with minerals tablet, Take 1 tablet by mouth once daily., Disp: , Rfl:     pen needle, diabetic (BD ULTRA-FINE SHORT PEN NEEDLE) 31 gauge x 5/16" Ndle, Uses w/ lantus daily. 90 day, Disp: 100 each, Rfl: 3    pravastatin (PRAVACHOL) 40 MG tablet, TAKE 1 TABLET BY MOUTH EVERY DAY, Disp: 90 tablet, Rfl: 3    riboflavin, vitamin B2, 400 mg Tab, Take 400 mg by mouth once daily., Disp: 90 tablet, Rfl: 3    scopolamine (TRANSDERM-SCOP) 1.3-1.5 mg (1 mg over 3 days), Place 1 patch onto the skin every 72 hours., Disp: 4 patch, Rfl: 0    sodium bicarbonate 650 MG tablet, TAKE 1 TABLET (650 MG TOTAL) BY MOUTH 3 (THREE) TIMES DAILY., Disp: 270 tablet, Rfl: 2    sumatriptan (IMITREX) 100 MG tablet, Take 1 tablet (100 mg total) by mouth 2 (two) times daily as needed for Migraine., Disp: 9 tablet, Rfl: 11    sumatriptan (IMITREX) 100 MG tablet, TAKE 1 TABLET (100 MG TOTAL) BY MOUTH 2 (TWO) TIMES DAILY AS NEEDED FOR MIGRAINE., Disp: 9 tablet, Rfl: 6    SYNTHROID 50 mcg tablet, TAKE 1 TABLET (50 MCG TOTAL) BY MOUTH ONCE DAILY., Disp: 30 tablet, Rfl: 8    SYNTHROID 50 mcg tablet, TAKE 1 TABLET (50 MCG TOTAL) BY MOUTH ONCE DAILY., Disp: 30 tablet, Rfl: 6    topiramate (TOPAMAX) 200 MG Tab, TAKE 1 TABLET BY MOUTH TWICE A DAY, Disp: 180 tablet, Rfl: 2    traZODone (DESYREL) 100 MG tablet, TAKE 1 TABLET BY MOUTH AT BEDTIME MAY TAKE AN EXTRA HALF TABLET IF NEEDED, Disp: 45 tablet, Rfl: 12    venlafaxine (EFFEXOR-XR) 75 MG 24 hr capsule, Take 2 capsules (150 mg total) by mouth once daily., Disp: 180 capsule, Rfl: 1    cloNIDine " (CATAPRES) 0.1 MG tablet, Take 1 tablet (0.1 mg total) by mouth once. for 1 dose, Disp: 30 tablet, Rfl: 3    oxybutynin (DITROPAN-XL) 10 MG 24 hr tablet, TAKE 1 TABLET (10 MG TOTAL) BY MOUTH ONCE DAILY., Disp: 30 tablet, Rfl: 9    PHYSICAL EXAMINATION:    Constitutional: She is oriented to person, place, and time. She appears well-developed and well-nourished.  She is in no apparent distress.    Neck: Normal ROM.     Cardiovascular: Regular rhythm.      Pulmonary/Chest: Effort normal. No respiratory distress.     Abdominal:  She exhibits no distension.  There is no CVA tenderness.   SP stoma patent. + granulating tissue. No surrounding erythema.    Lymphadenopathy:          Right: No supraclavicular adenopathy present.        Left: No supraclavicular adenopathy present.     Neurological: She is alert and oriented to person, place, and time.     Skin: Skin is warm and dry.     Extremities: Decreased strength to BLE    Psych: Cooperative with normal affect.      Physical Exam      LABS:      IMPRESSION:    Encounter Diagnoses   Name Primary?    Neurogenic bladder Yes    Chronic suprapubic catheter     Encounter for suprapubic catheter care     Bladder spasms        PLAN:  -I removed old SPT without any difficulty and properly disposed.   Stoma cleaned and prepped with betadine.  Lidocaine gel injected.  I placed a 16 fr SPT using sterile technique, without difficulty. Yellow urine received.  Bladder was irrigated with 60cc and a good catheter position was confirmed.  Balloon inflated by with ~10cc sterile water.   Catheter was plugged.   The wound was cleaned and dressed.  Silver nitrate to granulated tissue  The patient tolerated well.  Daily skin care and suprapubic catheter care discussed.  Educational materials given.  Increase water intake to help prevent sediment.   RTC 4 weeks for next SPT change.        I spent 30 minutes with the patient of which more than half was spent in coordinating the patient's  care as well as in direct consultation with the patient in regards to our treatment and plan.

## 2019-05-01 NOTE — TELEPHONE ENCOUNTER
Last Rx refill-----04/02/19  Last office visit--02/27/19  Next office visit--05/27/19      methocarbomol and hydrocodone

## 2019-05-20 RX ORDER — BUTALBITAL, ACETAMINOPHEN AND CAFFEINE 50; 325; 40 MG/1; MG/1; MG/1
TABLET ORAL
Qty: 10 TABLET | Refills: 0 | Status: SHIPPED | OUTPATIENT
Start: 2019-05-20 | End: 2019-06-18 | Stop reason: SDUPTHER

## 2019-05-20 NOTE — TELEPHONE ENCOUNTER
rx called to recorder as dr vee approved.    [facility]/pharmacy #[id number] - [place], [place] - [address]   [address] [place] [address]   Phone: [phone]

## 2019-05-22 ENCOUNTER — PATIENT MESSAGE (OUTPATIENT)
Dept: ENDOCRINOLOGY | Facility: CLINIC | Age: 69
End: 2019-05-22

## 2019-05-27 ENCOUNTER — OFFICE VISIT (OUTPATIENT)
Dept: PHYSICAL MEDICINE AND REHAB | Facility: CLINIC | Age: 69
End: 2019-05-27
Payer: MEDICARE

## 2019-05-27 ENCOUNTER — TELEPHONE (OUTPATIENT)
Dept: PHARMACY | Facility: CLINIC | Age: 69
End: 2019-05-27

## 2019-05-27 VITALS
SYSTOLIC BLOOD PRESSURE: 139 MMHG | HEART RATE: 106 BPM | HEIGHT: 68 IN | DIASTOLIC BLOOD PRESSURE: 88 MMHG | BODY MASS INDEX: 43.8 KG/M2 | WEIGHT: 289 LBS

## 2019-05-27 DIAGNOSIS — M47.816 SPONDYLOSIS OF LUMBAR REGION WITHOUT MYELOPATHY OR RADICULOPATHY: ICD-10-CM

## 2019-05-27 DIAGNOSIS — M47.812 SPONDYLOSIS OF CERVICAL REGION WITHOUT MYELOPATHY OR RADICULOPATHY: ICD-10-CM

## 2019-05-27 DIAGNOSIS — E66.01 MORBID OBESITY WITH BMI OF 40.0-44.9, ADULT: ICD-10-CM

## 2019-05-27 DIAGNOSIS — M54.2 CHRONIC NECK PAIN: ICD-10-CM

## 2019-05-27 DIAGNOSIS — G89.29 CHRONIC MIDLINE THORACIC BACK PAIN: ICD-10-CM

## 2019-05-27 DIAGNOSIS — M79.7 FIBROMYALGIA: ICD-10-CM

## 2019-05-27 DIAGNOSIS — Z79.891 CHRONIC USE OF OPIATE FOR THERAPEUTIC PURPOSE: ICD-10-CM

## 2019-05-27 DIAGNOSIS — G89.29 CHRONIC NECK PAIN: ICD-10-CM

## 2019-05-27 DIAGNOSIS — M54.6 CHRONIC MIDLINE THORACIC BACK PAIN: ICD-10-CM

## 2019-05-27 DIAGNOSIS — G56.03 BILATERAL CARPAL TUNNEL SYNDROME: ICD-10-CM

## 2019-05-27 DIAGNOSIS — G89.29 CHRONIC MIDLINE LOW BACK PAIN WITHOUT SCIATICA: Primary | ICD-10-CM

## 2019-05-27 DIAGNOSIS — M54.50 CHRONIC MIDLINE LOW BACK PAIN WITHOUT SCIATICA: Primary | ICD-10-CM

## 2019-05-27 PROCEDURE — 99214 PR OFFICE/OUTPT VISIT, EST, LEVL IV, 30-39 MIN: ICD-10-PCS | Mod: S$PBB,,, | Performed by: PHYSICAL MEDICINE & REHABILITATION

## 2019-05-27 PROCEDURE — 99213 OFFICE O/P EST LOW 20 MIN: CPT | Mod: PBBFAC | Performed by: PHYSICAL MEDICINE & REHABILITATION

## 2019-05-27 PROCEDURE — 99214 OFFICE O/P EST MOD 30 MIN: CPT | Mod: S$PBB,,, | Performed by: PHYSICAL MEDICINE & REHABILITATION

## 2019-05-27 PROCEDURE — 99999 PR PBB SHADOW E&M-EST. PATIENT-LVL III: CPT | Mod: PBBFAC,,, | Performed by: PHYSICAL MEDICINE & REHABILITATION

## 2019-05-27 PROCEDURE — 99999 PR PBB SHADOW E&M-EST. PATIENT-LVL III: ICD-10-PCS | Mod: PBBFAC,,, | Performed by: PHYSICAL MEDICINE & REHABILITATION

## 2019-05-27 RX ORDER — HYDROCODONE BITARTRATE AND ACETAMINOPHEN 10; 325 MG/1; MG/1
1 TABLET ORAL EVERY 6 HOURS PRN
Qty: 120 TABLET | Refills: 0 | Status: SHIPPED | OUTPATIENT
Start: 2019-06-01 | End: 2019-06-27 | Stop reason: SDUPTHER

## 2019-05-27 NOTE — PROGRESS NOTES
Subjective:       Patient ID: Cecile Bowen is a 68 y.o. female.    Chief Complaint: No chief complaint on file.    HPI     HISTORY OF PRESENT ILLNESS:  Ms. Bowen is a 68-year-old white female with past   medical history of HTN, DM, CKD, hypothyroidism, morbid obesity (BMI in the 40s)   and OA.  She presented to the [facility] on 02/27/2019 for   chronic low back pain due to DJD, status post multiple procedures including RFAs   and ESIs, chronic thoracic pain, chronic neck pain and bilateral carpal tunnel   syndrome.  She was maintained on venlafaxine, p.r.n. hydrocodone/APAP and p.r.n.   methocarbamol.    The patient is coming to the clinic for followup.  Her back pain has been waxing   and waning.  It is a constant aching pain in the lumbar spine and across her   back.  She denies any radiation to her legs.  Her maximum pain is 9/10 and   minimum 6/10.  Today, it is 7/10.  The patient denies any change in her lower   extremity strength.  She has chronic bowel urgency.  She is status post   suprapubic catheter due to neurogenic bladder.    Her neck pain has been stable.  It is an intermittent aching pain in the   cervical spine.  She denies any radiation to her arms.  Her maximum pain is 8/10   and minimum 0/10.  Today, it is 3/10.  The patient denies any upper extremity   weakness.  She does have occasional associated occipital headaches.  She also   suffers with migraine and is followed up by Neurology.  Her thoracic pain has   been stable.  Her bilateral hand numbness has been under good control.  She uses   carpal tunnel splints intermittently.    She is currently taking venlafaxine XR 75 mg capsules, two capsules once per   day.  She takes hydrocodone/APAP 10/325 p.r.n. four times per day.  She takes   methocarbamol 750 mg p.o. p.r.n., usually one or two tablets three times per   day.  She takes occasionally three tablets at a time with better relief and no   significant side  effects.      MS/HN  dd: 05/27/2019 13:39:49 (CDT)  td: 05/28/2019 03:44:23 (CDT)  Doc ID   #[id number]  Job ID #[id number]    CC:           Review of Systems   Constitutional: Positive for fatigue. Negative for chills and fever.   Eyes: Negative for visual disturbance.   Respiratory: Positive for shortness of breath.    Cardiovascular: Negative for chest pain.   Gastrointestinal: Positive for diarrhea. Negative for abdominal pain, constipation, nausea and vomiting.   Genitourinary: Positive for difficulty urinating.   Musculoskeletal: Positive for arthralgias, back pain, gait problem, neck pain and neck stiffness. Negative for joint swelling.   Neurological: Positive for dizziness, weakness and headaches.   Psychiatric/Behavioral: Positive for sleep disturbance. Negative for behavioral problems.       Objective:      Physical Exam   Constitutional: She is oriented to person, place, and time. She appears well-developed and well-nourished.   Coming to the clinic in a manual wheelchair propelled by medical assistant     HENT:   Head: Normocephalic and atraumatic.   Neck: Normal range of motion.   Mild pain at end range.  +ve tenderness.   Pulmonary/Chest: Effort normal.   Musculoskeletal:   BUE:  ROM:   RUE: full.   LUE: full.  Strength:    RUE: 5-/5 at shoulder abduction, 5 elbow flexion, 5 elbow extension, 5 hand .   LUE: 5-/5 at shoulder abduction, 5 elbow flexion, 5 elbow extension, 5 hand .  Sensation to pinprick:   RUE: intact.   LUE: intact.    BLE:  ROM:   RLE: full.   LLE: full.  Knee crepitus:   RLE: +ve.   LLE: +ve.   Strength:    RLE: 5-/5 at hip flexion, 5 knee extension, 5 ankle DF, 5 PF.   LLE: 5-/5 at hip flexion, 5 knee extension, 5 ankle DF, 5 PF.  Sensation to pinprick:     RLE: intact.      LLE: intact.   SLR (sitting):      RLE: -ve.      LLE: -ve.    +ve tenderness over thoracic and lumbar spine.  +ve diffuse tender points over the upper & lower back, anterior chest wall, hips.   Neurological:  She is alert and oriented to person, place, and time.   Skin: Skin is warm.   Psychiatric: She has a normal mood and affect. Her behavior is normal.   Vitals reviewed.      Assessment:       1. Chronic midline low back pain without sciatica    2. Spondylosis of lumbar region without myelopathy or radiculopathy    3. Chronic midline thoracic back pain    4. Chronic neck pain    5. Spondylosis of cervical region without myelopathy or radiculopathy    6. Fibromyalgia    7. Bilateral carpal tunnel syndrome    8. Morbid obesity with BMI of 40.0-44.9, adult    9. Chronic use of opiate for therapeutic purpose        Plan:       - NSAIDs will be avoided due to CKD.  - Continue venlafaxine -XR 75 mg capsule, 2 capsules (150 mg total) once daily (for depression but should help with arthritic pain and fibromyalgia).  - Continue HYDROcodone-acetaminophen (NORCO)  mg per tablet; Take 1 tablet by mouth every 6 (six) hours as needed.  - Continue methocarbamol (ROBAXIN) 750 MG Tab; Take 1-2 tablets (750-1,500 mg total) by mouth 3 (three) times daily as needed.  - Continue to wear Carpal Tunnel Splints at night. If symptoms progress, she may need steroid injection into carpal tunnel.  - Weight loss was encouraged.  - Regular home exercise program was encouraged.  - Follow up in about 3 months (around 8/27/2019).     This was a 25 minute visit, 50% of which was spent educating the patient about the diagnosis and the treatment plan.

## 2019-05-30 ENCOUNTER — LAB VISIT (OUTPATIENT)
Dept: LAB | Facility: HOSPITAL | Age: 69
End: 2019-05-30
Attending: INTERNAL MEDICINE
Payer: MEDICARE

## 2019-05-30 DIAGNOSIS — N18.4 CKD (CHRONIC KIDNEY DISEASE) STAGE 4, GFR 15-29 ML/MIN: ICD-10-CM

## 2019-05-30 LAB
ALBUMIN SERPL BCP-MCNC: 2.9 G/DL (ref 3.5–5.2)
ANION GAP SERPL CALC-SCNC: 10 MMOL/L (ref 8–16)
BUN SERPL-MCNC: 27 MG/DL (ref 8–23)
CALCIUM SERPL-MCNC: 9.3 MG/DL (ref 8.7–10.5)
CHLORIDE SERPL-SCNC: 104 MMOL/L (ref 95–110)
CO2 SERPL-SCNC: 21 MMOL/L (ref 23–29)
CREAT SERPL-MCNC: 2 MG/DL (ref 0.5–1.4)
EST. GFR  (AFRICAN AMERICAN): 29.3 ML/MIN/1.73 M^2
EST. GFR  (NON AFRICAN AMERICAN): 25.5 ML/MIN/1.73 M^2
ESTIMATED AVG GLUCOSE: 146 MG/DL (ref 68–131)
GLUCOSE SERPL-MCNC: 186 MG/DL (ref 70–110)
HBA1C MFR BLD HPLC: 6.7 % (ref 4–5.6)
PHOSPHATE SERPL-MCNC: 3.8 MG/DL (ref 2.7–4.5)
POTASSIUM SERPL-SCNC: 4.5 MMOL/L (ref 3.5–5.1)
PTH-INTACT SERPL-MCNC: 145 PG/ML (ref 9–77)
SODIUM SERPL-SCNC: 135 MMOL/L (ref 136–145)
TSH SERPL DL<=0.005 MIU/L-ACNC: 3.36 UIU/ML (ref 0.4–4)

## 2019-05-30 PROCEDURE — 83036 HEMOGLOBIN GLYCOSYLATED A1C: CPT

## 2019-05-30 PROCEDURE — 84443 ASSAY THYROID STIM HORMONE: CPT

## 2019-05-30 PROCEDURE — 83970 ASSAY OF PARATHORMONE: CPT

## 2019-05-30 PROCEDURE — 36415 COLL VENOUS BLD VENIPUNCTURE: CPT

## 2019-05-30 PROCEDURE — 80069 RENAL FUNCTION PANEL: CPT

## 2019-06-04 ENCOUNTER — OFFICE VISIT (OUTPATIENT)
Dept: UROLOGY | Facility: CLINIC | Age: 69
End: 2019-06-04
Payer: MEDICARE

## 2019-06-04 VITALS
SYSTOLIC BLOOD PRESSURE: 118 MMHG | HEART RATE: 82 BPM | BODY MASS INDEX: 43.8 KG/M2 | RESPIRATION RATE: 15 BRPM | HEIGHT: 68 IN | DIASTOLIC BLOOD PRESSURE: 96 MMHG | WEIGHT: 289 LBS

## 2019-06-04 DIAGNOSIS — Z43.5 ENCOUNTER FOR CARE OR REPLACEMENT OF SUPRAPUBIC TUBE: ICD-10-CM

## 2019-06-04 DIAGNOSIS — N31.9 NEUROGENIC BLADDER: Primary | ICD-10-CM

## 2019-06-04 DIAGNOSIS — Z93.59 CHRONIC SUPRAPUBIC CATHETER: ICD-10-CM

## 2019-06-04 PROCEDURE — 99214 PR OFFICE/OUTPT VISIT, EST, LEVL IV, 30-39 MIN: ICD-10-PCS | Mod: S$PBB,25,, | Performed by: NURSE PRACTITIONER

## 2019-06-04 PROCEDURE — 99214 OFFICE O/P EST MOD 30 MIN: CPT | Mod: S$PBB,25,, | Performed by: NURSE PRACTITIONER

## 2019-06-04 PROCEDURE — 99999 PR PBB SHADOW E&M-EST. PATIENT-LVL V: ICD-10-PCS | Mod: PBBFAC,,, | Performed by: NURSE PRACTITIONER

## 2019-06-04 PROCEDURE — 51705 CHANGE OF BLADDER TUBE: CPT | Mod: S$PBB,,, | Performed by: NURSE PRACTITIONER

## 2019-06-04 PROCEDURE — 51705 CHANGE OF BLADDER TUBE: CPT | Mod: PBBFAC | Performed by: NURSE PRACTITIONER

## 2019-06-04 PROCEDURE — 51705 PR CHANGE OF BLADDER TUBE,SIMPLE: ICD-10-PCS | Mod: S$PBB,,, | Performed by: NURSE PRACTITIONER

## 2019-06-04 PROCEDURE — 99215 OFFICE O/P EST HI 40 MIN: CPT | Mod: PBBFAC,25 | Performed by: NURSE PRACTITIONER

## 2019-06-04 PROCEDURE — 99999 PR PBB SHADOW E&M-EST. PATIENT-LVL V: CPT | Mod: PBBFAC,,, | Performed by: NURSE PRACTITIONER

## 2019-06-05 NOTE — PROGRESS NOTES
Subjective:       Patient ID: Cecile Bowen is a 68 y.o. female.    Chief Complaint: Neurogenic Bladder (chronic SPT)    Cecile Bowen is a 68 y.o. Diabetic Female with history of bilateral hydronephrosis, incomplete bladder emptying which is managed by SPT (16fr).  S/p Cystoscopy with bladder botox injection (300u) 09/17/2018 with Dr. Whittington.    Her last SPT change was 05/01/2019.    Here today for scheduled SPT change.  Bladder spasms present but greatly reduced with Botox 03/25/2019 with Dr. Whittington.  She reports improved migraines with new medication    No fever, n/v              Past Medical History:    Diabetes type 2, uncontrolled                   12/12/2012    Obesity                                         12/12/2012    Hypertension                                    12/12/2012    DJD (degenerative joint disease)                12/12/2012    Hypothyroidism                                  12/12/2012    Nuclear sclerosis - Both Eyes                   3/24/2014     Migraine                                                      Past Surgical History:    TOTAL ABDOMINAL HYSTERECTOMY W/ BILATERAL SALP*  1985          GALLBLADDER SURGERY                              2006          TONSILLECTOMY, ADENOIDECTOMY                                   OVARIAN CYST SURGERY                             1985          HYSTERECTOMY                                                   DILATION AND CURETTAGE OF UTERUS                               Review of patient's family history indicates:    Diabetes                       Sister                    Kidney disease                 Sister                    ALS                            Mother                      Comment: d.    Cancer                         Maternal Grandmother        Comment: d. colon    Cancer                         Paternal Grandfather        Comment: d. lung    Diabetes                       Maternal Aunt             Kidney disease                  "Maternal Aunt             Diabetes                       Maternal Uncle            Amblyopia                      Neg Hx                    Blindness                      Neg Hx                    Cataracts                      Neg Hx                    Glaucoma                       Neg Hx                    Macular degeneration           Neg Hx                    Retinal detachment             Neg Hx                    Strabismus                     Neg Hx                      Social History    Marital Status:             Spouse Name:                       Years of Education:                 Number of children:               Occupational History    None on file    Social History Main Topics    Smoking Status: Never Smoker                      Smokeless Status: Never Used                        Alcohol Use: No              Drug Use: No              Sexual Activity: Not Currently           Birth Control/Protection: Abstinence    Other Topics            Concern    None on file    Social History Narrative    Single      Lives w/ sister  And brother     both are helping her during her post hosp recovery period        Allergies:  Review of patient's allergies indicates no known allergies.    Medications:  Current outpatient prescriptions:amitriptyline (ELAVIL) 10 MG tablet, TAKE 3 TABLETS BY MOUTH IN THE EVENING, Disp: 90 tablet, Rfl: 5;  aspirin (ECOTRIN) 81 MG EC tablet, Take 81 mg by mouth once daily., Disp: , Rfl: ;  BD INSULIN PEN NEEDLE UF SHORT 31 X 5/16 " Ndle, USE ONCE DAILY WITH LANTUS SOLOSTAR PEN INSULIN, Disp: 100 each, Rfl: 11  butalbital-acetaminophen-caffeine -40 mg (FIORICET, ESGIC) -40 mg per tablet, TAKE 1 TABLET EVERY 4 TO 6 HOURS, Disp: 30 tablet, Rfl: 0;  cyanocobalamin, vitamin B-12, (VITAMIN B-12) 2,500 mcg Subl, Place 2,500 mcg under the tongue once daily., Disp: , Rfl: ;  diphenhydrAMINE (BENADRYL) 25 mg capsule, Take 25 mg by mouth every 6 (six) hours as needed., Disp: , " "Rfl:   ferrous sulfate 325 (65 FE) MG EC tablet, Take 325 mg by mouth 3 (three) times daily with meals., Disp: , Rfl: ;  fluticasone (FLONASE) 50 mcg/actuation nasal spray, 1 SPRAY IN EACH NOSTRIL DAILY (Patient taking differently: 1 SPRAY IN EACH NOSTRIL DAILY PRN), Disp: 16 g, Rfl: 1;  furosemide (LASIX) 20 MG tablet, Take 1 tablet (20 mg total) by mouth once daily., Disp: 4 tablet, Rfl: 0  gemfibrozil (LOPID) 600 MG tablet, TAKE 1 TABLET BY MOUTH TWICE A DAY, Disp: 60 tablet, Rfl: 5;  (START ON 4/29/2015) hydrocodone-acetaminophen 10-325mg (NORCO)  mg Tab, Take 1 tablet by mouth every 6 (six) hours as needed., Disp: 120 tablet, Rfl: 0;  insulin lispro (HUMALOG) 100 unit/mL injection, Inject 7 Units into the skin 3 (three) times daily before meals., Disp: 6.3 mL, Rfl: 11  insulin syringe-needle U-100 (BD INSULIN SYRINGE ULTRA-FINE) 1/2 mL 31 x 15/64" Syrg, 1 Box by Misc.(Non-Drug; Combo Route) route 4 (four) times daily., Disp: 100 Syringe, Rfl: 12;  lancets (ONE TOUCH DELICA LANCETS) Misc, Ultra 2 lancets to check blood sugar BID, Disp: 100 each, Rfl: 6;  LANTUS SOLOSTAR 100 unit/mL (3 mL) InPn pen, , Disp: , Rfl: 3  LIDODERM 5 %(700 mg/patch), APPLY 1 PATCH TO SKIN DAILY (LEAVING ON FOR 12 HOURS AND OFF FOR 12 HOURS), Disp: 30 patch, Rfl: 6;  magnesium oxide (MAG-OX) 400 mg tablet, TAKE 1 TABLET (400 MG TOTAL) BY MOUTH 2 (TWO) TIMES DAILY., Disp: 60 tablet, Rfl: 11;  methocarbamol (ROBAXIN) 750 MG Tab, TAKE 1 TO 2 TABLETS BY MOUTH 3 TIMES A DAY (Patient taking differently: Take 2 tablets twice daily), Disp: 120 tablet, Rfl: 3  methocarbamol (ROBAXIN) 750 MG Tab, TAKE 1 TO 2 TABLETS BY MOUTH 3 TIMES A DAY, Disp: 120 tablet, Rfl: 3;  methylPREDNISolone (MEDROL DOSEPACK) 4 mg tablet, use as directed, Disp: 21 tablet, Rfl: 0;  multivitamin with minerals tablet, Take 1 tablet by mouth once daily., Disp: , Rfl: ;  pravastatin (PRAVACHOL) 40 MG tablet, TAKE 1 TABLET BY MOUTH EVERY DAY, Disp: 30 tablet, Rfl: " 2  pyridoxine (B-6) 100 MG Tab, Take 1 tablet (100 mg total) by mouth once daily., Disp: 30 tablet, Rfl: 12;  scopolamine (TRANSDERM-SCOP) 1.5 mg, Place 1 patch (1.5 mg total) onto the skin every 72 hours., Disp: 4 patch, Rfl: 0;  sulfamethoxazole-trimethoprim 800-160mg (BACTRIM DS) 800-160 mg Tab, Take 1 tablet by mouth 2 (two) times daily., Disp: , Rfl: 0  sumatriptan (IMITREX) 100 MG tablet, TAKE 1 TABLET (100 MG TOTAL) BY MOUTH ONCE., Disp: 9 tablet, Rfl: 6;  SYNTHROID 125 mcg tablet, TAKE 1 TABLET BY MOUTH DAILY, Disp: 90 tablet, Rfl: 4;  topiramate (TOPAMAX) 100 MG tablet, TAKE 1 TABLET (100 MG TOTAL) BY MOUTH 2 (TWO) TIMES DAILY., Disp: 60 tablet, Rfl: 11;  topiramate (TOPAMAX) 25 MG tablet, Take 4 tablets (100 mg total) by mouth 2 (two) times daily., Disp: 240 tablet, Rfl: 5  venlafaxine (EFFEXOR-XR) 150 MG Cp24, TAKE 1 CAPSULE BY MOUTH ONCE DAILY, Disp: 30 capsule, Rfl: 2;  vitamin D (VITAMIN D3) 185 MG Tab, Take 185 mg by mouth once daily., Disp: , Rfl:         Review of Systems   Constitutional: Negative.  Negative for chills and fever.   HENT: Negative for facial swelling and trouble swallowing.    Eyes: Negative for visual disturbance.   Respiratory: Negative for cough, chest tightness, shortness of breath and wheezing.    Cardiovascular: Negative for chest pain and palpitations.   Gastrointestinal: Negative for abdominal pain, constipation, nausea and vomiting.   Genitourinary: Positive for difficulty urinating. Negative for dysuria, hematuria, urgency and vaginal bleeding.        SPT draining well.     Musculoskeletal: Positive for arthralgias and gait problem.   Skin: Negative for rash.   Neurological: Positive for weakness and headaches. Negative for dizziness.        Hx of Migraines.     Hematological: Does not bruise/bleed easily.   Psychiatric/Behavioral: Negative for behavioral problems.       Objective:      Physical Exam   Nursing note and vitals reviewed.  Constitutional: She is oriented to  person, place, and time. She appears well-developed and well-nourished.   HENT:   Head: Normocephalic.   Right Ear: External ear normal.   Left Ear: External ear normal.   Nose: Nose normal.   Eyes: Conjunctivae and lids are normal. Right eye exhibits no discharge. Left eye exhibits no discharge. No scleral icterus.   Neck: Trachea normal and normal range of motion. Neck supple. No tracheal deviation present.   Cardiovascular: Normal rate, regular rhythm and intact distal pulses.    Pulmonary/Chest: Effort normal. No respiratory distress.   Abdominal: Soft. Bowel sounds are normal. She exhibits no distension and no mass. There is no tenderness. There is no rebound, no guarding and no CVA tenderness.       Musculoskeletal: Normal range of motion. She exhibits no edema.   Neurological: She is alert and oriented to person, place, and time.   Skin: Skin is warm, dry and intact.     Psychiatric: She has a normal mood and affect. Her behavior is normal. Judgment and thought content normal.       Assessment:       1. Neurogenic bladder    2. Chronic suprapubic catheter    3. Encounter for care or replacement of suprapubic tube        Plan:       I spent 25 minutes with the patient of which more than half was spent in direct consultation with the patient in regards to our treatment and plan.    Education and recommendations of today's plan of care including home remedies.  Old SPT easily removed.   Stoma prepped with betadine.   New 16fr SPT eaplaced; urine received.   Irrigated the bladder.  Balloon inflated 9cc sterile water.  Tolerated well  RTC 4 weeks Voiced understanding

## 2019-06-11 ENCOUNTER — OFFICE VISIT (OUTPATIENT)
Dept: NEPHROLOGY | Facility: CLINIC | Age: 69
End: 2019-06-11
Payer: MEDICARE

## 2019-06-11 VITALS
SYSTOLIC BLOOD PRESSURE: 110 MMHG | HEART RATE: 102 BPM | DIASTOLIC BLOOD PRESSURE: 70 MMHG | OXYGEN SATURATION: 96 % | BODY MASS INDEX: 44.41 KG/M2 | HEIGHT: 68 IN | WEIGHT: 293 LBS

## 2019-06-11 DIAGNOSIS — Z93.59 CHRONIC SUPRAPUBIC CATHETER: ICD-10-CM

## 2019-06-11 DIAGNOSIS — E66.01 MORBID OBESITY DUE TO EXCESS CALORIES: ICD-10-CM

## 2019-06-11 DIAGNOSIS — G44.209 MIXED MIGRAINE AND MUSCLE CONTRACTION HEADACHE: ICD-10-CM

## 2019-06-11 DIAGNOSIS — I10 ESSENTIAL HYPERTENSION: ICD-10-CM

## 2019-06-11 DIAGNOSIS — G43.909 MIXED MIGRAINE AND MUSCLE CONTRACTION HEADACHE: ICD-10-CM

## 2019-06-11 DIAGNOSIS — N18.4 CKD (CHRONIC KIDNEY DISEASE) STAGE 4, GFR 15-29 ML/MIN: ICD-10-CM

## 2019-06-11 DIAGNOSIS — G47.33 OSA (OBSTRUCTIVE SLEEP APNEA): ICD-10-CM

## 2019-06-11 DIAGNOSIS — D50.8 OTHER IRON DEFICIENCY ANEMIA: ICD-10-CM

## 2019-06-11 DIAGNOSIS — N25.81 SECONDARY HYPERPARATHYROIDISM, RENAL: ICD-10-CM

## 2019-06-11 DIAGNOSIS — R06.02 SOB (SHORTNESS OF BREATH): Primary | ICD-10-CM

## 2019-06-11 DIAGNOSIS — E87.20 METABOLIC ACIDOSIS: ICD-10-CM

## 2019-06-11 DIAGNOSIS — E55.9 VITAMIN D DEFICIENCY DISEASE: ICD-10-CM

## 2019-06-11 PROCEDURE — 99999 PR PBB SHADOW E&M-EST. PATIENT-LVL III: CPT | Mod: PBBFAC,,, | Performed by: INTERNAL MEDICINE

## 2019-06-11 PROCEDURE — 99214 OFFICE O/P EST MOD 30 MIN: CPT | Mod: S$PBB,,, | Performed by: INTERNAL MEDICINE

## 2019-06-11 PROCEDURE — 99999 PR PBB SHADOW E&M-EST. PATIENT-LVL III: ICD-10-PCS | Mod: PBBFAC,,, | Performed by: INTERNAL MEDICINE

## 2019-06-11 PROCEDURE — 99213 OFFICE O/P EST LOW 20 MIN: CPT | Mod: PBBFAC,PO | Performed by: INTERNAL MEDICINE

## 2019-06-11 PROCEDURE — 99214 PR OFFICE/OUTPT VISIT, EST, LEVL IV, 30-39 MIN: ICD-10-PCS | Mod: S$PBB,,, | Performed by: INTERNAL MEDICINE

## 2019-06-11 RX ORDER — ROSUVASTATIN CALCIUM 40 MG/1
40 TABLET, COATED ORAL NIGHTLY
COMMUNITY
End: 2019-09-14 | Stop reason: SDUPTHER

## 2019-06-13 ENCOUNTER — OFFICE VISIT (OUTPATIENT)
Dept: ENDOCRINOLOGY | Facility: CLINIC | Age: 69
End: 2019-06-13
Payer: MEDICARE

## 2019-06-13 VITALS
BODY MASS INDEX: 44.41 KG/M2 | WEIGHT: 293 LBS | SYSTOLIC BLOOD PRESSURE: 120 MMHG | DIASTOLIC BLOOD PRESSURE: 77 MMHG | HEIGHT: 68 IN | HEART RATE: 80 BPM

## 2019-06-13 DIAGNOSIS — R51.9 CHRONIC DAILY HEADACHE: ICD-10-CM

## 2019-06-13 DIAGNOSIS — E03.9 ACQUIRED HYPOTHYROIDISM: ICD-10-CM

## 2019-06-13 DIAGNOSIS — F33.1 MAJOR DEPRESSIVE DISORDER, RECURRENT EPISODE, MODERATE: ICD-10-CM

## 2019-06-13 DIAGNOSIS — N18.4 CKD (CHRONIC KIDNEY DISEASE) STAGE 4, GFR 15-29 ML/MIN: ICD-10-CM

## 2019-06-13 DIAGNOSIS — I10 ESSENTIAL HYPERTENSION: ICD-10-CM

## 2019-06-13 DIAGNOSIS — H25.13 NUCLEAR SCLEROSIS OF BOTH EYES: ICD-10-CM

## 2019-06-13 DIAGNOSIS — E78.2 MIXED HYPERLIPIDEMIA: ICD-10-CM

## 2019-06-13 DIAGNOSIS — Z74.09 MOBILITY IMPAIRED: ICD-10-CM

## 2019-06-13 DIAGNOSIS — G89.29 CHRONIC BILATERAL LOW BACK PAIN WITHOUT SCIATICA: ICD-10-CM

## 2019-06-13 DIAGNOSIS — E55.9 VITAMIN D DEFICIENCY DISEASE: ICD-10-CM

## 2019-06-13 DIAGNOSIS — M54.50 CHRONIC BILATERAL LOW BACK PAIN WITHOUT SCIATICA: ICD-10-CM

## 2019-06-13 DIAGNOSIS — E66.01 MORBID OBESITY DUE TO EXCESS CALORIES: ICD-10-CM

## 2019-06-13 DIAGNOSIS — G47.33 OSA (OBSTRUCTIVE SLEEP APNEA): ICD-10-CM

## 2019-06-13 PROCEDURE — 99999 PR PBB SHADOW E&M-EST. PATIENT-LVL III: ICD-10-PCS | Mod: PBBFAC,,, | Performed by: NURSE PRACTITIONER

## 2019-06-13 PROCEDURE — 99213 OFFICE O/P EST LOW 20 MIN: CPT | Mod: PBBFAC | Performed by: NURSE PRACTITIONER

## 2019-06-13 PROCEDURE — 99214 OFFICE O/P EST MOD 30 MIN: CPT | Mod: S$PBB,,, | Performed by: NURSE PRACTITIONER

## 2019-06-13 PROCEDURE — 99214 PR OFFICE/OUTPT VISIT, EST, LEVL IV, 30-39 MIN: ICD-10-PCS | Mod: S$PBB,,, | Performed by: NURSE PRACTITIONER

## 2019-06-13 PROCEDURE — 99999 PR PBB SHADOW E&M-EST. PATIENT-LVL III: CPT | Mod: PBBFAC,,, | Performed by: NURSE PRACTITIONER

## 2019-06-13 RX ORDER — INSULIN GLARGINE 100 [IU]/ML
INJECTION, SOLUTION SUBCUTANEOUS
Qty: 15 ML | Refills: 6
Start: 2019-06-13 | End: 2019-11-20

## 2019-06-13 RX ORDER — INSULIN LISPRO 100 [IU]/ML
INJECTION, SOLUTION INTRAVENOUS; SUBCUTANEOUS
Qty: 20 ML | Refills: 6
Start: 2019-06-13 | End: 2020-02-07 | Stop reason: DRUGHIGH

## 2019-06-13 NOTE — PROGRESS NOTES
CC: This 68 y.o. female presents for management of diabetes mellitus     HPI: She was diagnosed with T2DM in 2006, found on routine bloodwork screening given family hx of DM. Previously tried Actos but not effective.   Last seen by CAROLE Dwyer DNP in 2017.  Pt is being seen by me again, a1c is at target, reports hypoglycemia 18 times in the past 6 weeks.  Mild hypoglycemic episodes mostly, 60s.  Loves freestyle nadya x 4 mos ago.  Need f/u with 3 mos w/ [employer] supplier (freestyle nadya).  Lives w/ brother (scoliosis, ca) and sister (has dm, herniated disc), gets meals on wheels.   a1c went from 7.9 to 6.7%  Reports that her migraines have improved over the past 6 mos.  Has f/u with cardiology and nephrology.  Appt with cardiology tomorrow 6/14/19.   Lab Results   Component Value Date    HGBA1C 6.7 (H) 05/30/2019     Hard to cook meals, ADLs at times   Social hx: ex gutierrez    DIET/ MEAL PATTERN: Eat 3 meals a day,   Breakfast- yogurts, fruit  Lunch- hot meals, smoothie from [employer]   Dinner- Ready to Eat from [employer] , [employer]  Bedtime snacks-limits, if so popcorn  Stopped meals on wheels    EXERCISE: no formal exercise, limited r/t back pain  7/10    CURRENT DM MEDS: Lantus Solostar 21 units daily, Humalog vials 5-7-7 units  w/ scale  Checks Sugar 6-10  times a day.                                                                         Switching insurances in July and she is switching to [facility]    STANDARDS OF CARE:  Diabetes Management Status    Statin: Taking  ACE/ARB: Not taking    Screening or Prevention Patient's value Goal Complete/Controlled?   HgA1C Testing and Control   Lab Results   Component Value Date    HGBA1C 6.7 (H) 05/30/2019      Annually/Less than 8% Yes   Lipid profile : 12/13/2018 Annually Yes   LDL control Lab Results   Component Value Date    LDLCALC 100.0 12/13/2018    Annually/Less than 100 mg/dl  No   Nephropathy screening Lab Results   Component Value Date    LABMICR 178.0  09/21/2017     Lab Results   Component Value Date    PROTEINUA 2+ (A) 03/14/2019    Annually Yes   Blood pressure BP Readings from Last 1 Encounters:   06/13/19 120/77    Less than 140/90 Yes   Dilated retinal exam : 08/23/2018 Annually Yes   Foot exam   : 10/24/2018 Annually Yes     No history of retinopathy. Sees Dr Kearns   Foot exam: 10/2018     BMD 12/16- + osteopenia, due 12/2019, needs to reschedule                    ROS:   Gen: good appetite, generalized fatigue and weakness. Wt gain 6#  Skin: Skin is intact, no rashes .  Eyes: + visual disturbances, +cataracts in both eyes, has 2 different glasses   Resp:   no SOB + WOLFE, no cough  Cardiac: No palpitations, chest pain, denies syncope, weakness, no edema or cyanosis.  GI: No nausea or vomiting, no constipation   /GYN: has suprapubic catheter placed.   PVD: c/o chronic back pain rates 7/10.  MS/Neuro: Denies numbness/ tingling in BLE; migraines every day-imitrex (generic-takes 1/2 tab #9 a month, excedrin migraine). In scooter  Psych: Denies drug/ETOH abuse, no hx. of eating disorders   Other systems: negative.    PE:  GENERAL: Well developed, well nourished.  PSYCH: AAOx3, appropriate mood and affect, pleasant expression, conversant, appears relaxed, well groomed.   EYES: Conjunctiva, corneas clear, no lid lag, EOM intact.  NECK: Supple, trachea midline  VASCULAR:  1+ (L) worse than (R)edema BLE, brisk capillary refill BUE.  SKIN:   Skin warm and dry.  no acanthosis nigracans. +venous stasis-worse on (L)-marking   Feet- footwear appropriate -diabetic shoes and socks      Hemoglobin A1C   Date Value Ref Range Status   05/30/2019 6.7 (H) 4.0 - 5.6 % Final     Comment:     ADA Screening Guidelines:  5.7-6.4%  Consistent with prediabetes  >or=6.5%  Consistent with diabetes  High levels of fetal hemoglobin interfere with the HbA1C  assay. Heterozygous hemoglobin variants (HbS, HgC, etc)do  not significantly interfere with this assay.   However, presence of  multiple variants may affect accuracy.     01/22/2019 7.9 (H) 4.0 - 5.6 % Final     Comment:     ADA Screening Guidelines:  5.7-6.4%  Consistent with prediabetes  >or=6.5%  Consistent with diabetes  High levels of fetal hemoglobin interfere with the HbA1C  assay. Heterozygous hemoglobin variants (HbS, HgC, etc)do  not significantly interfere with this assay.   However, presence of multiple variants may affect accuracy.     12/13/2018 8.5 (H) 4.0 - 5.6 % Final     Comment:     ADA Screening Guidelines:  5.7-6.4%  Consistent with prediabetes  >or=6.5%  Consistent with diabetes  High levels of fetal hemoglobin interfere with the HbA1C  assay. Heterozygous hemoglobin variants (HbS, HgC, etc)do  not significantly interfere with this assay.   However, presence of multiple variants may affect accuracy.       Lab Results   Component Value Date    TSH 3.357 05/30/2019       ASSESSMENT and PLAN:  1. Uncontrolled type 2 diabetes mellitus with chronic kidney disease, with long-term current use of insulin     2. Uncontrolled type 2 diabetes mellitus with diabetic neuropathy, without long-term current use of insulin     3. Nuclear sclerosis of both eyes     4. Essential hypertension     5. Mixed hyperlipidemia     6. Chronic daily headache     7. CKD (chronic kidney disease) stage 4, GFR 15-29 ml/min     8. Acquired hypothyroidism  TSH   9. Major depressive disorder, recurrent episode, moderate     10. Vitamin D deficiency disease     11. Morbid obesity due to excess calories     12. Chronic bilateral low back pain without sciatica     13. VIJAYA (obstructive sleep apnea)     14. Mobility impaired       1. F/u in 3 mos  Doing well  a1c much improved  Goal under 7% with minimal hypoglycemia  Continue freestyle nadya  Increase basal 24 units daily  Change prandial 5-7 units w/ meals depending on po intake  Scale 180-230+2, etc  Continue to monitor >6 times a day  Has h/o hypoglycemia  2. See above  3. F/u with ophthalmology   4.  Controlled, continue med(s)  F/u with nephrology  5.   Lab Results   Component Value Date    LDLCALC 100.0 12/13/2018     At goal  6. F/u with neuro  7. F/u with nephrology   8.   Lab Results   Component Value Date    TSH 3.357 05/30/2019     Continue lt4  Take regularly on empty stomach, water only  9. On med, f/u with pcp  10. On supplement  11. Body mass index is 44.85 kg/m². may increase insulin resistance.  12. F/u with pain management, uses scooter  13. Not using cpap  14. See above

## 2019-06-13 NOTE — PATIENT INSTRUCTIONS
Snacks can be an important part of a balanced, healthy meal plan. They allow you to eat more frequently, feeling full and satisfied throughout the day. Also, they allow you to spread carbohydrates evenly, which may stabilize blood sugars.  Plus, snacks are enjoyable!     The amount of carbohydrate needed at snacks varies. Generally, about 15-30 grams of carbohydrate per snack is recommended.  Below you will find some tasty treats.       0-5 gm carb   Crystal Light   Vitamin Water Zero   Herbal tea, unsweetened   2 tsp peanut butter on celery   1./2 cup sugar-free jell-o   1 sugar-free popsicle   ¼ cup blueberries   8oz Blue Anna unsweetened almond milk   5 baby carrots & celery sticks, cucumbers, bell peppers dipped in ¼ cup salsa, 2Tbsp light ranch dressing or 2Tbsp plain Greek yogurt   10 Goldfish crackers   ½ oz low-fat cheese or string cheese   1 closed handful of nuts, unsalted   1 Tbsp of sunflower seeds, unsalted   1 cup Smart Pop popcorn   1 whole grain brown rice cake        15 gm carb   1 small piece of fruit or ½ banana or 1/2 cup lite canned fruit   3 susana cracker squares   3 cups Smart Pop popcorn, top spray butter, Contreras lite salt or cinnamon and Truvia   5 Vanilla Wafers   ½ cup low fat, no added sugar ice cream or frozen yogurt (Blue bell, Blue Bunny, Weight Watchers, Skinny Cow)   ½ turkey, ham, or chicken sandwich   ½ c fruit with ½ c Cottage cheese   4-6 unsalted wheat crackers with 1 oz low fat cheese or 1 tbsp peanut butter    30-45 goldfish crackers (depending on flavor)    7-8 Yazidi mini brown rice cakes (caramel, apple cinnamon, chocolate)    12 Yazidi mini brown rice cakes (cheddar, bbq, ranch)    1/3 cup hummus dip with raw veg   1/2 whole wheat kena, 1Tbsp hummus   Mini Pizza (1/2 whole wheat English muffin, low-fat  cheese, tomato sauce)   100 calorie snack pack (Oreo, Chips Ahoy, Ritz Mix, Baked Cheetos)   4-6 oz. light or Greek Style yogurt  (Sharyn, Brynn, Liza, [facility])   ½ cup sugar-free pudding     6 in. wheat tortilla or kena oven toasted chips (topped with spray butter flavoring, cinnamon, Truvia OR spray butter, garlic powder, chili powder)    18 BBQ Popchips (available at [employer], [employer], Fresh Market)                   Diabetes Support Group Meetings         Date: Topic:   February 14 Eat Fit BASIA/Health Promotion   March 14 Taking Care of Your Smile   April 11 Spring into Healthy Eating/Cooking Demo   May 9 Ease Your Mind with Diabetes   Zaynab 13 Summer Treats/Cooking Demo   July 11 Eat Fit BASIA/Super Market Sweep   August 8 Taking Care of Your Eyes and Feet   Sept 12 Technology/ADA updates   October 10 Recipes & Treats/Cooking Demo   November 14 Heart Health/Pump it up!   December 12 Year-End Close Out        Meetings are held in the [facility] (A) of the [facility] located at [address] [place] [address]. Please call [phone] for additional information.    Free service, offered every 2nd Thursday of every month! Family members and/or friends are welcome as well!  Support group is for patients with type 1 or type 2 diabetes.    From 3:30p to 4:30p

## 2019-06-14 ENCOUNTER — OFFICE VISIT (OUTPATIENT)
Dept: CARDIOLOGY | Facility: CLINIC | Age: 69
End: 2019-06-14
Payer: MEDICARE

## 2019-06-14 VITALS
HEART RATE: 92 BPM | BODY MASS INDEX: 44.41 KG/M2 | DIASTOLIC BLOOD PRESSURE: 70 MMHG | WEIGHT: 293 LBS | OXYGEN SATURATION: 96 % | SYSTOLIC BLOOD PRESSURE: 118 MMHG | HEIGHT: 68 IN

## 2019-06-14 DIAGNOSIS — R06.09 DOE (DYSPNEA ON EXERTION): Primary | ICD-10-CM

## 2019-06-14 DIAGNOSIS — E78.2 MIXED HYPERLIPIDEMIA: ICD-10-CM

## 2019-06-14 DIAGNOSIS — G47.33 OSA (OBSTRUCTIVE SLEEP APNEA): ICD-10-CM

## 2019-06-14 DIAGNOSIS — E66.01 MORBID OBESITY DUE TO EXCESS CALORIES: ICD-10-CM

## 2019-06-14 DIAGNOSIS — R26.9 ABNORMALITY OF GAIT AND MOBILITY: ICD-10-CM

## 2019-06-14 DIAGNOSIS — I10 ESSENTIAL HYPERTENSION: ICD-10-CM

## 2019-06-14 DIAGNOSIS — N18.4 CKD (CHRONIC KIDNEY DISEASE) STAGE 4, GFR 15-29 ML/MIN: ICD-10-CM

## 2019-06-14 PROCEDURE — 99999 PR PBB SHADOW E&M-EST. PATIENT-LVL III: CPT | Mod: PBBFAC,,, | Performed by: INTERNAL MEDICINE

## 2019-06-14 PROCEDURE — 93005 ELECTROCARDIOGRAM TRACING: CPT | Mod: PBBFAC | Performed by: INTERNAL MEDICINE

## 2019-06-14 PROCEDURE — 99204 OFFICE O/P NEW MOD 45 MIN: CPT | Mod: S$PBB,,, | Performed by: INTERNAL MEDICINE

## 2019-06-14 PROCEDURE — 93010 ELECTROCARDIOGRAM REPORT: CPT | Mod: S$PBB,,, | Performed by: INTERNAL MEDICINE

## 2019-06-14 PROCEDURE — 99204 PR OFFICE/OUTPT VISIT, NEW, LEVL IV, 45-59 MIN: ICD-10-PCS | Mod: S$PBB,,, | Performed by: INTERNAL MEDICINE

## 2019-06-14 PROCEDURE — 93010 EKG 12-LEAD: ICD-10-PCS | Mod: S$PBB,,, | Performed by: INTERNAL MEDICINE

## 2019-06-14 PROCEDURE — 99213 OFFICE O/P EST LOW 20 MIN: CPT | Mod: PBBFAC | Performed by: INTERNAL MEDICINE

## 2019-06-14 PROCEDURE — 99999 PR PBB SHADOW E&M-EST. PATIENT-LVL III: ICD-10-PCS | Mod: PBBFAC,,, | Performed by: INTERNAL MEDICINE

## 2019-06-14 NOTE — PROGRESS NOTES
"Subjective:   Patient ID:  Cecile Bowen is a 68 y.o. female who presents for evaluation of Shortness of Breath      HPI:   The patient presents for evaluation of shortness of breath on exertion. She is at very limitid activity only able to walk less than 100 feet due to back pain. Has noted with her little activity she is now becoming SOB . Denies chest pain or pressure with some coronary calcification on chest CT.  Cecile Bowen has dyslipidemia  on high intensity statin. She does not smoke. Takes PRN Clonidine when she has pain and checks her BP. Cecile Bowen chronic kidney disease stage IV. Cecile Bowen has obstructive sleep apnea untreated.Occasional orthostatic dizziness  Review of Systems   Constitution: Negative for malaise/fatigue, weight gain and weight loss.   Eyes: Negative for blurred vision.   Cardiovascular: Negative for chest pain, claudication, cyanosis, dyspnea on exertion, irregular heartbeat, leg swelling, near-syncope, orthopnea, palpitations, paroxysmal nocturnal dyspnea and syncope.   Respiratory: Negative for cough, shortness of breath and wheezing.         VIJAYA untreated   Musculoskeletal: Positive for back pain and joint pain. Negative for falls and myalgias.   Gastrointestinal: Negative for abdominal pain, heartburn, nausea and vomiting.   Genitourinary: Negative for nocturia.        Suprapubic catheter   Neurological: Positive for dizziness and headaches. Negative for brief paralysis, focal weakness, numbness, paresthesias and weakness.   Psychiatric/Behavioral: Negative for altered mental status.       Current Outpatient Medications   Medication Sig    BD INSULIN SYRINGE ULTRA-FINE 1/2 mL 31 gauge x 15/64" Syrg USE WITH INSULIN 4 TIMES A DAY    blood sugar diagnostic Strp ONE TOUCH ULTRA TEST STRIPS//Check blood sugars QID    butalbital-acetaminophen-caffeine -40 mg (FIORICET, ESGIC) -40 mg per tablet TAKE 1 TABLET BY MOUTH WHEN NOT CONTROLLED BY OTHER " "MEDICATIONS. NO MORE THAN 10 TABS PER 30 DAYS    cyanocobalamin, vitamin B-12, (VITAMIN B-12) 5,000 mcg Subl Place 1 tablet under the tongue once daily.    erenumab-aooe 140 mg/mL AtIn Inject 1 syringe (140 mg total) into the skin every 28 days.    ergocalciferol (VITAMIN D2) 50,000 unit Cap TAKE ONE CAPSULE BY MOUTH ONE TIME PER WEEK    ferrous sulfate 325 (65 FE) MG EC tablet Take 325 mg by mouth once daily.     gemfibrozil (LOPID) 600 MG tablet TAKE 1 TABLET BY MOUTH EVERY OTHER DAY    HYDROcodone-acetaminophen (NORCO)  mg per tablet Take 1 tablet by mouth every 6 (six) hours as needed.    insulin (LANTUS SOLOSTAR U-100 INSULIN) glargine 100 units/mL (3mL) SubQ pen Inject 24 units daily.    insulin lispro (HUMALOG U-100 INSULIN) 100 unit/mL injection Inject 5-7 units w/ meals plus scale 180-230+2, 231-280+4, 281-330+6, 331-380+8, >380 +10.    lancets Misc Ultra 2 lancets to check blood sugar BID    magnesium oxide (MAG-OX) 400 mg (241.3 mg magnesium) tablet TAKE 1 TABLET (400 MG TOTAL) BY MOUTH 2 (TWO) TIMES DAILY.    methocarbamol (ROBAXIN) 750 MG Tab Take 1-2 tablets (750-1,500 mg total) by mouth 3 (three) times daily as needed.    multivitamin with minerals tablet Take 1 tablet by mouth once daily.    pen needle, diabetic (BD ULTRA-FINE SHORT PEN NEEDLE) 31 gauge x 5/16" Ndle Uses w/ lantus daily. 90 day    riboflavin, vitamin B2, 400 mg Tab Take 400 mg by mouth once daily.    rosuvastatin (CRESTOR) 40 MG Tab Take 40 mg by mouth every evening.    scopolamine (TRANSDERM-SCOP) 1.3-1.5 mg (1 mg over 3 days) Place 1 patch onto the skin every 72 hours.    sodium bicarbonate 650 MG tablet TAKE 1 TABLET (650 MG TOTAL) BY MOUTH 3 (THREE) TIMES DAILY. (Patient taking differently: TAKE 1 TABLET (650 MG TOTAL) BY MOUTH 1 IN THE MORNING AND 2 TABLETS IN THE EVENING)    sumatriptan (IMITREX) 100 MG tablet TAKE 1 TABLET (100 MG TOTAL) BY MOUTH 2 (TWO) TIMES DAILY AS NEEDED FOR MIGRAINE.    SYNTHROID " "50 mcg tablet TAKE 1 TABLET (50 MCG TOTAL) BY MOUTH ONCE DAILY.    topiramate (TOPAMAX) 200 MG Tab TAKE 1 TABLET BY MOUTH TWICE A DAY    traZODone (DESYREL) 100 MG tablet TAKE 1 TABLET BY MOUTH AT BEDTIME MAY TAKE AN EXTRA HALF TABLET IF NEEDED    venlafaxine (EFFEXOR-XR) 75 MG 24 hr capsule Take 2 capsules (150 mg total) by mouth once daily.    blood-glucose meter kit ONE TOUCH ULTRA    cloNIDine (CATAPRES) 0.1 MG tablet Take 1 tablet (0.1 mg total) by mouth once. for 1 dose (Patient taking differently: Take 0.1 mg by mouth daily as needed. )    oxybutynin (DITROPAN-XL) 10 MG 24 hr tablet TAKE 1 TABLET (10 MG TOTAL) BY MOUTH ONCE DAILY.    pravastatin (PRAVACHOL) 40 MG tablet TAKE 1 TABLET BY MOUTH EVERY DAY     No current facility-administered medications for this visit.      Objective:   Physical Exam   Constitutional: She is oriented to person, place, and time. She appears well-developed. No distress.   /70   Pulse 92   Ht 5' 8" (1.727 m)   Wt 133.8 kg (295 lb)   SpO2 96%   BMI 44.85 kg/m²   Examined on a scooter   HENT:   Head: Normocephalic.   Eyes: Pupils are equal, round, and reactive to light. Conjunctivae are normal. No scleral icterus.   Neck: Neck supple. No JVD present. No thyromegaly present.   Cardiovascular: Normal rate, regular rhythm, normal heart sounds and intact distal pulses. PMI is not displaced. Exam reveals no gallop and no friction rub.   No murmur heard.  Peripheral pulses ( DP and PT not well felt   Pulmonary/Chest: Effort normal and breath sounds normal. No respiratory distress. She has no wheezes. She has no rales.   Abdominal: Soft. She exhibits no distension. There is no splenomegaly or hepatomegaly. There is no tenderness.   Musculoskeletal: She exhibits no edema or tenderness.   Scooter   Neurological: She is alert and oriented to person, place, and time.   Skin: Skin is warm and dry. She is not diaphoretic.   Psychiatric: She has a normal mood and affect. Her " behavior is normal.       Lab Results   Component Value Date     (L) 05/30/2019    K 4.5 05/30/2019     05/30/2019    CO2 21 (L) 05/30/2019    BUN 27 (H) 05/30/2019    CREATININE 2.0 (H) 05/30/2019     (H) 05/30/2019    HGBA1C 6.7 (H) 05/30/2019    MG 1.9 09/23/2014    AST 11 09/21/2017    ALT 10 09/21/2017    ALBUMIN 2.9 (L) 05/30/2019    PROT 7.3 09/21/2017    BILITOT 0.3 09/21/2017    WBC 10.42 01/22/2019    HGB 13.6 01/22/2019    HCT 43.0 01/22/2019    HCT 40 06/24/2017    MCV 92 01/22/2019     (H) 01/22/2019    TSH 3.357 05/30/2019    CHOL 189 12/13/2018    HDL 51 12/13/2018    LDLCALC 100.0 12/13/2018    TRIG 190 (H) 12/13/2018       Assessment:     1. WOLFE (dyspnea on exertion) : Suspect multifactorial. Possible diastolic dysfunction , PHT from untreated VIJAYA and obesity, deconditioning   2. Essential hypertension : Needs monitoring   3. Mixed hyperlipidemia : Due for recheck   4. Morbid obesity due to excess calories    5. Uncontrolled type 2 diabetes mellitus with chronic kidney disease, with long-term current use of insulin    6. Abnormality of gait and mobility : Contributing to #1   7. CKD (chronic kidney disease) stage 4, GFR 15-29 ml/min    8. VIJAYA (obstructive sleep apnea)        Plan:     Cecile was seen today for shortness of breath.    Diagnoses and all orders for this visit:    WOLFE (dyspnea on exertion)  -     Transthoracic echo (TTE) 2D with Color Flow; Future    Essential hypertension  Home BP monitoring encouraged with a log  Mixed hyperlipidemia  -     Lipid panel; Future; Expected date: 06/14/2019    Morbid obesity due to excess calories  Mediterranean Diet recommended with carbohydrate restriction  Uncontrolled type 2 diabetes mellitus with chronic kidney disease, with long-term current use of insulin    Abnormality of gait and mobility    CKD (chronic kidney disease) stage 4, GFR 15-29 ml/min    VIJAYA (obstructive sleep apnea)

## 2019-06-14 NOTE — LETTER
June 14, 2019      Michael López MD  [address] [facility] [address]  [address]  [facility] [address]           [facility]  [address]  [place] [address]  Phone: [phone]          Patient: Cecile Bowen   MR Number: [id number]   YOB: 1950   Date of Visit: 6/14/2019       Dear Dr. Michael López:    Thank you for referring Cceile Bowen to me for evaluation. Attached you will find relevant portions of my assessment and plan of care.    If you have questions, please do not hesitate to call me. I look forward to following Cecile Bowen along with you.    Sincerely,    Mynor Gómez MD    Enclosure  CC:  No Recipients    If you would like to receive this communication electronically, please contact [email or web address] or [phone] to request more information on [employer] access.    For providers and/or their staff who would like to refer a patient to [facility], please contact us through our one-stop-shop provider referral line, [facility], at [phone].    If you feel you have received this communication in error or would no longer like to receive these types of communications, please e-mail [email or web address]

## 2019-06-18 RX ORDER — BUTALBITAL, ACETAMINOPHEN AND CAFFEINE 50; 325; 40 MG/1; MG/1; MG/1
TABLET ORAL
Qty: 10 TABLET | Refills: 0 | Status: SHIPPED | OUTPATIENT
Start: 2019-06-18 | End: 2019-08-12 | Stop reason: SDUPTHER

## 2019-06-19 ENCOUNTER — HOSPITAL ENCOUNTER (OUTPATIENT)
Dept: RADIOLOGY | Facility: HOSPITAL | Age: 69
Discharge: HOME | End: 2019-06-19
Attending: INTERNAL MEDICINE
Payer: MEDICARE

## 2019-06-19 DIAGNOSIS — Z12.39 BREAST CANCER SCREENING: ICD-10-CM

## 2019-06-19 PROCEDURE — 77067 MAMMO DIGITAL SCREENING BILAT WITH CAD: ICD-10-PCS | Mod: 26,,, | Performed by: RADIOLOGY

## 2019-06-19 PROCEDURE — 77067 SCR MAMMO BI INCL CAD: CPT | Mod: 26,,, | Performed by: RADIOLOGY

## 2019-06-19 PROCEDURE — 77067 SCR MAMMO BI INCL CAD: CPT | Mod: TC,PO

## 2019-06-20 ENCOUNTER — TELEPHONE (OUTPATIENT)
Dept: RADIOLOGY | Facility: HOSPITAL | Age: 69
End: 2019-06-20

## 2019-06-20 NOTE — TELEPHONE ENCOUNTER
Spoke with patient and explained mammogram findings.Patient expressed understanding of results. Patient scheduled abnormal mammogram follow up appointment at [facility] on 6/24/2019.

## 2019-06-26 ENCOUNTER — HOSPITAL ENCOUNTER (OUTPATIENT)
Dept: CARDIOLOGY | Facility: CLINIC | Age: 69
Discharge: HOME | End: 2019-06-26
Attending: INTERNAL MEDICINE
Payer: MEDICARE

## 2019-06-26 VITALS
SYSTOLIC BLOOD PRESSURE: 122 MMHG | DIASTOLIC BLOOD PRESSURE: 66 MMHG | WEIGHT: 293 LBS | HEART RATE: 70 BPM | HEIGHT: 68 IN | BODY MASS INDEX: 44.41 KG/M2

## 2019-06-26 DIAGNOSIS — R06.09 DOE (DYSPNEA ON EXERTION): ICD-10-CM

## 2019-06-26 LAB
ASCENDING AORTA: 3.33 CM
BSA FOR ECHO PROCEDURE: 2.53 M2
CV ECHO LV RWT: 0.45 CM
DOP CALC LVOT AREA: 4.9 CM2
DOP CALC LVOT DIAMETER: 2.49 CM
DOP CALC LVOT PEAK VEL: 1.02 M/S
DOP CALC LVOT STROKE VOLUME: 83.57 CM3
DOP CALCLVOT PEAK VEL VTI: 17.17 CM
E WAVE DECELERATION TIME: 111.89 MSEC
E/A RATIO: 0.66
E/E' RATIO: 6 M/S
ECHO LV POSTERIOR WALL: 0.85 CM (ref 0.6–1.1)
FRACTIONAL SHORTENING: 46 % (ref 28–44)
INTERVENTRICULAR SEPTUM: 0.93 CM (ref 0.6–1.1)
IVRT: 0.05 MSEC
LA MAJOR: 4.47 CM
LA MINOR: 4.8 CM
LA WIDTH: 3.84 CM
LEFT ATRIUM SIZE: 3.02 CM
LEFT ATRIUM VOLUME INDEX: 18.9 ML/M2
LEFT ATRIUM VOLUME: 45.63 CM3
LEFT INTERNAL DIMENSION IN SYSTOLE: 2.01 CM (ref 2.1–4)
LEFT VENTRICLE DIASTOLIC VOLUME INDEX: 24.69 ML/M2
LEFT VENTRICLE DIASTOLIC VOLUME: 59.52 ML
LEFT VENTRICLE MASS INDEX: 40 G/M2
LEFT VENTRICLE SYSTOLIC VOLUME INDEX: 5.4 ML/M2
LEFT VENTRICLE SYSTOLIC VOLUME: 12.91 ML
LEFT VENTRICULAR INTERNAL DIMENSION IN DIASTOLE: 3.74 CM (ref 3.5–6)
LEFT VENTRICULAR MASS: 97.01 G
LV LATERAL E/E' RATIO: 6 M/S
LV SEPTAL E/E' RATIO: 6 M/S
MV PEAK A VEL: 0.91 M/S
MV PEAK E VEL: 0.6 M/S
PULM VEIN S/D RATIO: 1.72
PV PEAK D VEL: 0.32 M/S
PV PEAK S VEL: 0.55 M/S
RA MAJOR: 4.08 CM
RA WIDTH: 2.81 CM
RIGHT VENTRICULAR END-DIASTOLIC DIMENSION: 3.05 CM
RV TISSUE DOPPLER FREE WALL SYSTOLIC VELOCITY 1 (APICAL 4 CHAMBER VIEW): 15.42 CM/S
SINUS: 3.77 CM
STJ: 3.44 CM
TDI LATERAL: 0.1 M/S
TDI SEPTAL: 0.1 M/S
TDI: 0.1 M/S
TRICUSPID ANNULAR PLANE SYSTOLIC EXCURSION: 1.78 CM

## 2019-06-26 PROCEDURE — 93306 TTE W/DOPPLER COMPLETE: CPT | Mod: PBBFAC | Performed by: INTERNAL MEDICINE

## 2019-06-26 PROCEDURE — 93306 TRANSTHORACIC ECHO (TTE) COMPLETE (CUPID ONLY): ICD-10-PCS | Mod: 26,S$PBB,, | Performed by: INTERNAL MEDICINE

## 2019-06-27 ENCOUNTER — TELEPHONE (OUTPATIENT)
Dept: PHARMACY | Facility: CLINIC | Age: 69
End: 2019-06-27

## 2019-06-27 ENCOUNTER — PATIENT MESSAGE (OUTPATIENT)
Dept: PHYSICAL MEDICINE AND REHAB | Facility: CLINIC | Age: 69
End: 2019-06-27

## 2019-06-27 DIAGNOSIS — M54.2 CHRONIC NECK PAIN: ICD-10-CM

## 2019-06-27 DIAGNOSIS — G89.29 CHRONIC MIDLINE LOW BACK PAIN WITHOUT SCIATICA: ICD-10-CM

## 2019-06-27 DIAGNOSIS — G89.29 CHRONIC NECK PAIN: ICD-10-CM

## 2019-06-27 DIAGNOSIS — M54.50 CHRONIC MIDLINE LOW BACK PAIN WITHOUT SCIATICA: ICD-10-CM

## 2019-06-27 DIAGNOSIS — M79.7 FIBROMYALGIA: ICD-10-CM

## 2019-06-27 RX ORDER — HYDROCODONE BITARTRATE AND ACETAMINOPHEN 10; 325 MG/1; MG/1
1 TABLET ORAL EVERY 6 HOURS PRN
Qty: 120 TABLET | Refills: 0 | Status: SHIPPED | OUTPATIENT
Start: 2019-07-01 | End: 2019-07-31 | Stop reason: SDUPTHER

## 2019-06-27 NOTE — PROGRESS NOTES
Subjective:       Patient ID: Cecile Bowen is a 68 y.o. White female who presents for follow-up evaluation of Chronic Kidney Disease    HPI     She reports she is feeling OK except her Hba1c jumped up to 12.1 from 7.5.  Her mobility remains poor and she continues to use a scooter. Her HAs are at bay. No cloudy urine. LE edema is about the same. No NSAID use, uses morphine derivatives for pain. 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 Her insurance has improved a new migraine HA medication and she is quire relieved. She uses clonidine. about 2X week for elevated BP. Most of the time she has a HA. No change in LE edema.     Interval history June 2019: She notes SOB, at rest and on exertion. She is on new treatment for migraines which has helped tremendously and her BP is much better controlled. Her Hba1c has dropped to 6.7. She is going on a cruise in the fall      Review of Systems   Constitutional: Negative for activity change, appetite change, fatigue and fever.   HENT: Negative for facial swelling.    Eyes: Negative for visual disturbance.   Respiratory: Negative for shortness of breath.    Cardiovascular: Positive for leg swelling. Negative for chest pain.   Gastrointestinal: Negative for constipation and diarrhea.   Genitourinary: Negative for difficulty urinating, dysuria and hematuria.   Musculoskeletal: Positive for arthralgias and back pain.   Neurological: Negative for weakness and headaches.   Psychiatric/Behavioral: Negative for sleep disturbance.       Objective:      Physical Exam   Constitutional: She is oriented to person, place, and time. She appears well-nourished.   HENT:   Mouth/Throat: Oropharynx is clear and moist.   Neck: No JVD present.   Cardiovascular: S1 normal and S2 normal. Exam reveals no friction rub.   Pulmonary/Chest: Breath sounds normal. She has no wheezes. She has no rales.   Abdominal: Soft.   Musculoskeletal: She  exhibits edema.   Neurological: She is alert and oriented to person, place, and time.   Skin: Skin is warm and dry.   Psychiatric: She has a normal mood and affect.   Vitals reviewed.      Assessment:       1. SOB (shortness of breath)    2. Uncontrolled type 2 diabetes mellitus with chronic kidney disease, with long-term current use of insulin    3. CKD (chronic kidney disease) stage 4, GFR 15-29 ml/min    4. Chronic suprapubic catheter    5. Metabolic acidosis    6. Essential hypertension    7. Mixed migraine and muscle contraction headache    8. Secondary hyperparathyroidism, renal    9. Vitamin D deficiency disease    10. Other iron deficiency anemia    11. Morbid obesity due to excess calories    12. VIJAYA (obstructive sleep apnea)        Plan:           CKD Stage 4 with stable kidney function. Proteinuria increased again but noted her potassium is slowly rising. May preclude RAAS blocker (would be aldactone given BP)    SOB--refer to Cardiology    Mineral and Bone Disease--PTH at goal. Continue calcitriol and D3. She is on exogenous bicarbonate for metabolic acidosis and bicarb is at goal    HTN is controlled     Suprapubic catheter status--continue Urology follow up    DM--much improved glycemic control    Morbid Obesity--consider bariatric surgery      RTC 4 months

## 2019-06-28 ENCOUNTER — HOSPITAL ENCOUNTER (OUTPATIENT)
Dept: RADIOLOGY | Facility: HOSPITAL | Age: 69
Discharge: HOME | End: 2019-06-28
Attending: INTERNAL MEDICINE
Payer: MEDICARE

## 2019-06-28 DIAGNOSIS — R92.8 ABNORMAL MAMMOGRAM: ICD-10-CM

## 2019-06-28 PROCEDURE — 77061 MAMMO DIGITAL DIAGNOSTIC LEFT WITH TOMOSYNTHESIS_CAD: ICD-10-PCS | Mod: 26,LT,, | Performed by: RADIOLOGY

## 2019-06-28 PROCEDURE — 77061 BREAST TOMOSYNTHESIS UNI: CPT | Mod: 26,LT,, | Performed by: RADIOLOGY

## 2019-06-28 PROCEDURE — 77061 BREAST TOMOSYNTHESIS UNI: CPT | Mod: TC,PO,LT

## 2019-06-28 PROCEDURE — 76642 US BREAST LEFT LIMITED: ICD-10-PCS | Mod: 26,LT,, | Performed by: RADIOLOGY

## 2019-06-28 PROCEDURE — 76642 ULTRASOUND BREAST LIMITED: CPT | Mod: 26,LT,, | Performed by: RADIOLOGY

## 2019-06-28 PROCEDURE — 77065 MAMMO DIGITAL DIAGNOSTIC LEFT WITH TOMOSYNTHESIS_CAD: ICD-10-PCS | Mod: 26,LT,, | Performed by: RADIOLOGY

## 2019-06-28 PROCEDURE — 77065 DX MAMMO INCL CAD UNI: CPT | Mod: TC,PO,LT

## 2019-06-28 PROCEDURE — 76642 ULTRASOUND BREAST LIMITED: CPT | Mod: TC,PO,LT

## 2019-06-28 PROCEDURE — 77065 DX MAMMO INCL CAD UNI: CPT | Mod: 26,LT,, | Performed by: RADIOLOGY

## 2019-07-01 ENCOUNTER — TELEPHONE (OUTPATIENT)
Dept: RADIOLOGY | Facility: HOSPITAL | Age: 69
End: 2019-07-01

## 2019-07-01 NOTE — TELEPHONE ENCOUNTER
Spoke with patient. Reviewed breast biopsy procedure and reviewed instructions for breast biopsy. Patient expressed understanding and all questions were answered. Provided patient with my phone number to call for any further concerns or questions.   Patient scheduled breast biopsy at the [facility] on 7/8/2019.

## 2019-07-08 ENCOUNTER — HOSPITAL ENCOUNTER (OUTPATIENT)
Dept: RADIOLOGY | Facility: HOSPITAL | Age: 69
Discharge: HOME | End: 2019-07-08
Attending: INTERNAL MEDICINE
Payer: MEDICARE

## 2019-07-08 DIAGNOSIS — R92.8 ABNORMAL MAMMOGRAM: ICD-10-CM

## 2019-07-08 PROCEDURE — 77065 MAMMO DIGITAL DIAGNOSTIC LEFT WITH CAD: ICD-10-PCS | Mod: 26,LT,, | Performed by: RADIOLOGY

## 2019-07-08 PROCEDURE — 88341 IMHCHEM/IMCYTCHM EA ADD ANTB: CPT | Mod: 26,59,, | Performed by: PATHOLOGY

## 2019-07-08 PROCEDURE — 88342 CHG IMMUNOCYTOCHEMISTRY: ICD-10-PCS | Mod: 26,59,, | Performed by: PATHOLOGY

## 2019-07-08 PROCEDURE — 88360 PR  TUMOR IMMUNOHISTOCHEM/MANUAL: ICD-10-PCS | Mod: 26,,, | Performed by: PATHOLOGY

## 2019-07-08 PROCEDURE — 88305 TISSUE EXAM BY PATHOLOGIST: CPT | Mod: 26,,, | Performed by: PATHOLOGY

## 2019-07-08 PROCEDURE — 19083 BX BREAST 1ST LESION US IMAG: CPT | Mod: LT,,, | Performed by: RADIOLOGY

## 2019-07-08 PROCEDURE — 88360 TUMOR IMMUNOHISTOCHEM/MANUAL: CPT | Mod: 26,,, | Performed by: PATHOLOGY

## 2019-07-08 PROCEDURE — 25000003 PHARM REV CODE 250: Mod: PO | Performed by: INTERNAL MEDICINE

## 2019-07-08 PROCEDURE — 27201068 US BREAST BIOPSY WITH IMAGING 1ST SITE LEFT: Mod: PO

## 2019-07-08 PROCEDURE — 88305 TISSUE EXAM BY PATHOLOGIST: CPT | Performed by: PATHOLOGY

## 2019-07-08 PROCEDURE — 19083 US BREAST BIOPSY WITH IMAGING 1ST SITE LEFT: ICD-10-PCS | Mod: LT,,, | Performed by: RADIOLOGY

## 2019-07-08 PROCEDURE — 88342 IMHCHEM/IMCYTCHM 1ST ANTB: CPT | Mod: 26,59,, | Performed by: PATHOLOGY

## 2019-07-08 PROCEDURE — 88341 PR IHC OR ICC EACH ADD'L SINGLE ANTIBODY  STAINPR: ICD-10-PCS | Mod: 26,59,, | Performed by: PATHOLOGY

## 2019-07-08 PROCEDURE — 77065 DX MAMMO INCL CAD UNI: CPT | Mod: TC,PO,LT

## 2019-07-08 PROCEDURE — 88360 TUMOR IMMUNOHISTOCHEM/MANUAL: CPT | Performed by: PATHOLOGY

## 2019-07-08 PROCEDURE — 77065 DX MAMMO INCL CAD UNI: CPT | Mod: 26,LT,, | Performed by: RADIOLOGY

## 2019-07-08 PROCEDURE — 88305 TISSUE SPECIMEN TO PATHOLOGY, RADIOLOGY: ICD-10-PCS | Mod: 26,,, | Performed by: PATHOLOGY

## 2019-07-08 RX ORDER — PEN NEEDLE, DIABETIC 29 G X1/2"
NEEDLE, DISPOSABLE MISCELLANEOUS
Qty: 100 EACH | Refills: 12 | Status: SHIPPED | OUTPATIENT
Start: 2019-07-08 | End: 2020-10-08

## 2019-07-08 RX ORDER — LIDOCAINE HYDROCHLORIDE AND EPINEPHRINE 20; 10 MG/ML; UG/ML
10 INJECTION, SOLUTION INFILTRATION; PERINEURAL ONCE
Status: COMPLETED | OUTPATIENT
Start: 2019-07-08 | End: 2019-07-08

## 2019-07-08 RX ORDER — LIDOCAINE HYDROCHLORIDE 10 MG/ML
1.5 INJECTION, SOLUTION EPIDURAL; INFILTRATION; INTRACAUDAL; PERINEURAL ONCE
Status: COMPLETED | OUTPATIENT
Start: 2019-07-08 | End: 2019-07-08

## 2019-07-08 RX ADMIN — LIDOCAINE HYDROCHLORIDE 1.5 ML: 10 INJECTION, SOLUTION EPIDURAL; INFILTRATION; INTRACAUDAL; PERINEURAL at 02:07

## 2019-07-08 RX ADMIN — LIDOCAINE HYDROCHLORIDE AND EPINEPHRINE 10 ML: 20; 10 INJECTION, SOLUTION INFILTRATION; PERINEURAL at 02:07

## 2019-07-09 ENCOUNTER — OFFICE VISIT (OUTPATIENT)
Dept: UROLOGY | Facility: CLINIC | Age: 69
End: 2019-07-09
Payer: MEDICARE

## 2019-07-09 VITALS
SYSTOLIC BLOOD PRESSURE: 148 MMHG | WEIGHT: 293 LBS | HEIGHT: 68 IN | HEART RATE: 113 BPM | BODY MASS INDEX: 44.41 KG/M2 | DIASTOLIC BLOOD PRESSURE: 80 MMHG

## 2019-07-09 DIAGNOSIS — N31.9 NEUROGENIC BLADDER: Primary | ICD-10-CM

## 2019-07-09 DIAGNOSIS — Z43.5 ENCOUNTER FOR CARE OR REPLACEMENT OF SUPRAPUBIC TUBE: ICD-10-CM

## 2019-07-09 DIAGNOSIS — Z93.59 CHRONIC SUPRAPUBIC CATHETER: ICD-10-CM

## 2019-07-09 PROCEDURE — 99213 OFFICE O/P EST LOW 20 MIN: CPT | Mod: PBBFAC | Performed by: NURSE PRACTITIONER

## 2019-07-09 PROCEDURE — 99214 PR OFFICE/OUTPT VISIT, EST, LEVL IV, 30-39 MIN: ICD-10-PCS | Mod: S$PBB,25,, | Performed by: NURSE PRACTITIONER

## 2019-07-09 PROCEDURE — 51705 CHANGE OF BLADDER TUBE: CPT | Mod: PBBFAC | Performed by: NURSE PRACTITIONER

## 2019-07-09 PROCEDURE — 51705 PR CHANGE OF BLADDER TUBE,SIMPLE: ICD-10-PCS | Mod: S$PBB,,, | Performed by: NURSE PRACTITIONER

## 2019-07-09 PROCEDURE — 99999 PR PBB SHADOW E&M-EST. PATIENT-LVL III: ICD-10-PCS | Mod: PBBFAC,,, | Performed by: NURSE PRACTITIONER

## 2019-07-09 PROCEDURE — 51705 CHANGE OF BLADDER TUBE: CPT | Mod: S$PBB,,, | Performed by: NURSE PRACTITIONER

## 2019-07-09 PROCEDURE — 99999 PR PBB SHADOW E&M-EST. PATIENT-LVL III: CPT | Mod: PBBFAC,,, | Performed by: NURSE PRACTITIONER

## 2019-07-09 PROCEDURE — 99214 OFFICE O/P EST MOD 30 MIN: CPT | Mod: S$PBB,25,, | Performed by: NURSE PRACTITIONER

## 2019-07-09 NOTE — PROGRESS NOTES
Subjective:       Patient ID: Cecile Bowen is a 68 y.o. female.    Chief Complaint: Neurogenic bladder    Cecile Bowen is a 68 y.o. Diabetic Female with history of bilateral hydronephrosis, incomplete bladder emptying which is managed by SPT (16fr).  S/p Cystoscopy with bladder botox injection (300u) 09/17/2018 with Dr. Whittington.    Her last SPT change was 06/04/2019.    Here today for scheduled SPT change.  Bladder spasms present but greatly reduced with Botox 03/25/2019 with Dr. Whittington.  She reports improved migraines with new medication    No fever, n/v    She reports recent breast biopsy.            Past Medical History:    Diabetes type 2, uncontrolled                   12/12/2012    Obesity                                         12/12/2012    Hypertension                                    12/12/2012    DJD (degenerative joint disease)                12/12/2012    Hypothyroidism                                  12/12/2012    Nuclear sclerosis - Both Eyes                   3/24/2014     Migraine                                                      Past Surgical History:    TOTAL ABDOMINAL HYSTERECTOMY W/ BILATERAL SALP*  1985          GALLBLADDER SURGERY                              2006          TONSILLECTOMY, ADENOIDECTOMY                                   OVARIAN CYST SURGERY                             1985          HYSTERECTOMY                                                   DILATION AND CURETTAGE OF UTERUS                               Review of patient's family history indicates:    Diabetes                       Sister                    Kidney disease                 Sister                    ALS                            Mother                      Comment: d.    Cancer                         Maternal Grandmother        Comment: d. colon    Cancer                         Paternal Grandfather        Comment: d. lung    Diabetes                       Maternal Aunt             Kidney  "disease                 Maternal Aunt             Diabetes                       Maternal Uncle            Amblyopia                      Neg Hx                    Blindness                      Neg Hx                    Cataracts                      Neg Hx                    Glaucoma                       Neg Hx                    Macular degeneration           Neg Hx                    Retinal detachment             Neg Hx                    Strabismus                     Neg Hx                      Social History    Marital Status:             Spouse Name:                       Years of Education:                 Number of children:               Occupational History    None on file    Social History Main Topics    Smoking Status: Never Smoker                      Smokeless Status: Never Used                        Alcohol Use: No              Drug Use: No              Sexual Activity: Not Currently           Birth Control/Protection: Abstinence    Other Topics            Concern    None on file    Social History Narrative    Single      Lives w/ sister  And brother     both are helping her during her post hosp recovery period        Allergies:  Review of patient's allergies indicates no known allergies.    Medications:  Current outpatient prescriptions:amitriptyline (ELAVIL) 10 MG tablet, TAKE 3 TABLETS BY MOUTH IN THE EVENING, Disp: 90 tablet, Rfl: 5;  aspirin (ECOTRIN) 81 MG EC tablet, Take 81 mg by mouth once daily., Disp: , Rfl: ;  BD INSULIN PEN NEEDLE UF SHORT 31 X 5/16 " Ndle, USE ONCE DAILY WITH LANTUS SOLOSTAR PEN INSULIN, Disp: 100 each, Rfl: 11  butalbital-acetaminophen-caffeine -40 mg (FIORICET, ESGIC) -40 mg per tablet, TAKE 1 TABLET EVERY 4 TO 6 HOURS, Disp: 30 tablet, Rfl: 0;  cyanocobalamin, vitamin B-12, (VITAMIN B-12) 2,500 mcg Subl, Place 2,500 mcg under the tongue once daily., Disp: , Rfl: ;  diphenhydrAMINE (BENADRYL) 25 mg capsule, Take 25 mg by mouth every 6 (six) " "hours as needed., Disp: , Rfl:   ferrous sulfate 325 (65 FE) MG EC tablet, Take 325 mg by mouth 3 (three) times daily with meals., Disp: , Rfl: ;  fluticasone (FLONASE) 50 mcg/actuation nasal spray, 1 SPRAY IN EACH NOSTRIL DAILY (Patient taking differently: 1 SPRAY IN EACH NOSTRIL DAILY PRN), Disp: 16 g, Rfl: 1;  furosemide (LASIX) 20 MG tablet, Take 1 tablet (20 mg total) by mouth once daily., Disp: 4 tablet, Rfl: 0  gemfibrozil (LOPID) 600 MG tablet, TAKE 1 TABLET BY MOUTH TWICE A DAY, Disp: 60 tablet, Rfl: 5;  (START ON 4/29/2015) hydrocodone-acetaminophen 10-325mg (NORCO)  mg Tab, Take 1 tablet by mouth every 6 (six) hours as needed., Disp: 120 tablet, Rfl: 0;  insulin lispro (HUMALOG) 100 unit/mL injection, Inject 7 Units into the skin 3 (three) times daily before meals., Disp: 6.3 mL, Rfl: 11  insulin syringe-needle U-100 (BD INSULIN SYRINGE ULTRA-FINE) 1/2 mL 31 x 15/64" Syrg, 1 Box by Misc.(Non-Drug; Combo Route) route 4 (four) times daily., Disp: 100 Syringe, Rfl: 12;  lancets (ONE TOUCH DELICA LANCETS) Misc, Ultra 2 lancets to check blood sugar BID, Disp: 100 each, Rfl: 6;  LANTUS SOLOSTAR 100 unit/mL (3 mL) InPn pen, , Disp: , Rfl: 3  LIDODERM 5 %(700 mg/patch), APPLY 1 PATCH TO SKIN DAILY (LEAVING ON FOR 12 HOURS AND OFF FOR 12 HOURS), Disp: 30 patch, Rfl: 6;  magnesium oxide (MAG-OX) 400 mg tablet, TAKE 1 TABLET (400 MG TOTAL) BY MOUTH 2 (TWO) TIMES DAILY., Disp: 60 tablet, Rfl: 11;  methocarbamol (ROBAXIN) 750 MG Tab, TAKE 1 TO 2 TABLETS BY MOUTH 3 TIMES A DAY (Patient taking differently: Take 2 tablets twice daily), Disp: 120 tablet, Rfl: 3  methocarbamol (ROBAXIN) 750 MG Tab, TAKE 1 TO 2 TABLETS BY MOUTH 3 TIMES A DAY, Disp: 120 tablet, Rfl: 3;  methylPREDNISolone (MEDROL DOSEPACK) 4 mg tablet, use as directed, Disp: 21 tablet, Rfl: 0;  multivitamin with minerals tablet, Take 1 tablet by mouth once daily., Disp: , Rfl: ;  pravastatin (PRAVACHOL) 40 MG tablet, TAKE 1 TABLET BY MOUTH EVERY DAY, " Disp: 30 tablet, Rfl: 2  pyridoxine (B-6) 100 MG Tab, Take 1 tablet (100 mg total) by mouth once daily., Disp: 30 tablet, Rfl: 12;  scopolamine (TRANSDERM-SCOP) 1.5 mg, Place 1 patch (1.5 mg total) onto the skin every 72 hours., Disp: 4 patch, Rfl: 0;  sulfamethoxazole-trimethoprim 800-160mg (BACTRIM DS) 800-160 mg Tab, Take 1 tablet by mouth 2 (two) times daily., Disp: , Rfl: 0  sumatriptan (IMITREX) 100 MG tablet, TAKE 1 TABLET (100 MG TOTAL) BY MOUTH ONCE., Disp: 9 tablet, Rfl: 6;  SYNTHROID 125 mcg tablet, TAKE 1 TABLET BY MOUTH DAILY, Disp: 90 tablet, Rfl: 4;  topiramate (TOPAMAX) 100 MG tablet, TAKE 1 TABLET (100 MG TOTAL) BY MOUTH 2 (TWO) TIMES DAILY., Disp: 60 tablet, Rfl: 11;  topiramate (TOPAMAX) 25 MG tablet, Take 4 tablets (100 mg total) by mouth 2 (two) times daily., Disp: 240 tablet, Rfl: 5  venlafaxine (EFFEXOR-XR) 150 MG Cp24, TAKE 1 CAPSULE BY MOUTH ONCE DAILY, Disp: 30 capsule, Rfl: 2;  vitamin D (VITAMIN D3) 185 MG Tab, Take 185 mg by mouth once daily., Disp: , Rfl:         Review of Systems   Constitutional: Negative.  Negative for chills and fever.   HENT: Negative for facial swelling and trouble swallowing.    Eyes: Negative for visual disturbance.   Respiratory: Negative for cough, chest tightness, shortness of breath and wheezing.    Cardiovascular: Negative for chest pain and palpitations.   Gastrointestinal: Negative for abdominal pain, constipation, nausea and vomiting.   Genitourinary: Positive for difficulty urinating. Negative for dysuria, hematuria, urgency and vaginal bleeding.        SPT draining well     Musculoskeletal: Positive for arthralgias and back pain. Negative for gait problem.   Skin: Negative for rash.   Neurological: Negative for dizziness and headaches.   Hematological: Does not bruise/bleed easily.   Psychiatric/Behavioral: Negative for behavioral problems.       Objective:      Physical Exam   Nursing note and vitals reviewed.  Constitutional: She is oriented to  person, place, and time. She appears well-developed and well-nourished.   HENT:   Head: Normocephalic.   Right Ear: External ear normal.   Left Ear: External ear normal.   Nose: Nose normal.   Eyes: Conjunctivae and lids are normal. Right eye exhibits no discharge. Left eye exhibits no discharge. No scleral icterus.   Neck: Trachea normal and normal range of motion. Neck supple. No tracheal deviation present.   Cardiovascular: Normal rate, regular rhythm and intact distal pulses.    Pulmonary/Chest: Effort normal. No respiratory distress.   Abdominal: Soft. Bowel sounds are normal. She exhibits no distension and no mass. There is no tenderness. There is no rebound and no guarding.       Musculoskeletal: Normal range of motion. She exhibits no edema.   Neurological: She is alert and oriented to person, place, and time.   Skin: Skin is warm, dry and intact.     Psychiatric: She has a normal mood and affect. Her behavior is normal. Judgment and thought content normal.       Assessment:       1. Neurogenic bladder    2. Chronic suprapubic catheter    3. Encounter for care or replacement of suprapubic tube        Plan:         I spent 25 minutes with the patient of which more than half was spent in direct consultation with the patient in regards to our treatment and plan.    Education and recommendations of today's plan of care including home remedies.  Old SPT easily removed.   Stoma prepped with betadine.   New 16fr SPT eaplaced; urine received.   Irrigated the bladder.  Balloon inflated 9cc sterile water.  Tolerated well  RTC 4 weeks Voiced understanding

## 2019-07-10 ENCOUNTER — DOCUMENTATION ONLY (OUTPATIENT)
Dept: SURGERY | Facility: CLINIC | Age: 69
End: 2019-07-10

## 2019-07-10 ENCOUNTER — PATIENT MESSAGE (OUTPATIENT)
Dept: SURGERY | Facility: CLINIC | Age: 69
End: 2019-07-10

## 2019-07-10 NOTE — PROGRESS NOTES
Called Patient with results of breast biopsy from 7/8/2019.  Explained that the biopsy showed invasive ductal carcinoma . Discussed what this means and that the next step is to meet with a breast surgeon. An appt was made for 7/15//2019 with Dr. Garcia.  Reviewed location of breast center. Patient verbalized understanding.  Oncology Navigation   Intake  Date of Diagnosis: 07/08/19  Internal / External Referral: Internal  Initial Nurse Navigator Contact: 07/10/19  Diagnosis to Initial Contact Timeline (days): 2 days  Contact Method: Phone  Date Worked: 07/10/19  First Appointment Available: 07/15/19  Appointment Date: 07/15/19  Schedule to Appointment Timeline (days): 5  First Available Date vs. Scheduled Date (days): 0     Treatment     Procedures: Biopsy  Biopsy Schedule Date: 07/08/19           Acuity      Follow Up  No follow-ups on file.

## 2019-07-12 ENCOUNTER — TELEPHONE (OUTPATIENT)
Dept: SURGERY | Facility: CLINIC | Age: 69
End: 2019-07-12

## 2019-07-12 NOTE — TELEPHONE ENCOUNTER
Called to check on pt. Pt stated she is doing well and she does not have any further questions. Reviewed appt information. Pt verbalized understanding.

## 2019-07-15 ENCOUNTER — TELEPHONE (OUTPATIENT)
Dept: UROLOGY | Facility: CLINIC | Age: 69
End: 2019-07-15

## 2019-07-15 ENCOUNTER — TELEPHONE (OUTPATIENT)
Dept: SURGERY | Facility: CLINIC | Age: 69
End: 2019-07-15

## 2019-07-15 DIAGNOSIS — N31.9 NEUROGENIC BLADDER: Primary | ICD-10-CM

## 2019-07-15 NOTE — TELEPHONE ENCOUNTER
Call to pt regarding appt. Pt states with all the rain today, she canceled appt as she has to get around on a scooter and would be difficult for her to do with the weather. Offered appt tomorrow at 2:30 pm. Pt accepts this appt.

## 2019-07-15 NOTE — TELEPHONE ENCOUNTER
----- Message from Charisse Munguia sent at 7/15/2019 12:34 PM CDT -----  Contact: Pt.Self   Patient Requesting Sooner Appointment.     Reason for sooner appt.:  breast cancer    When is the first available appointment?  Schedule not available @ time of call     Communication Preference:  [phone]    Additional Information:    Thank You

## 2019-07-16 ENCOUNTER — OFFICE VISIT (OUTPATIENT)
Dept: SURGERY | Facility: CLINIC | Age: 69
End: 2019-07-16
Payer: MEDICARE

## 2019-07-16 VITALS
TEMPERATURE: 98 F | WEIGHT: 293 LBS | SYSTOLIC BLOOD PRESSURE: 142 MMHG | HEIGHT: 68 IN | BODY MASS INDEX: 44.41 KG/M2 | HEART RATE: 110 BPM | DIASTOLIC BLOOD PRESSURE: 87 MMHG

## 2019-07-16 DIAGNOSIS — Z17.0 MALIGNANT NEOPLASM OF UPPER-OUTER QUADRANT OF LEFT BREAST IN FEMALE, ESTROGEN RECEPTOR POSITIVE: Primary | ICD-10-CM

## 2019-07-16 DIAGNOSIS — C50.412 MALIGNANT NEOPLASM OF UPPER-OUTER QUADRANT OF LEFT BREAST IN FEMALE, ESTROGEN RECEPTOR POSITIVE: Primary | ICD-10-CM

## 2019-07-16 PROCEDURE — 99205 OFFICE O/P NEW HI 60 MIN: CPT | Mod: S$PBB,,, | Performed by: SURGERY

## 2019-07-16 PROCEDURE — 99999 PR PBB SHADOW E&M-EST. PATIENT-LVL III: CPT | Mod: PBBFAC,,, | Performed by: SURGERY

## 2019-07-16 PROCEDURE — 99205 PR OFFICE/OUTPT VISIT, NEW, LEVL V, 60-74 MIN: ICD-10-PCS | Mod: S$PBB,,, | Performed by: SURGERY

## 2019-07-16 PROCEDURE — 99213 OFFICE O/P EST LOW 20 MIN: CPT | Mod: PBBFAC | Performed by: SURGERY

## 2019-07-16 PROCEDURE — 99999 PR PBB SHADOW E&M-EST. PATIENT-LVL III: ICD-10-PCS | Mod: PBBFAC,,, | Performed by: SURGERY

## 2019-07-16 NOTE — LETTER
July 19, 2019      Lurdes Navarro MD  [address] [facility] [place] [address]           [facility]  [address], [address]  [facility] [address]  Phone: [phone]  Fax: [phone]          Patient: Cecile Bowen   MR Number: [id number]   YOB: 1950   Date of Visit: 7/16/2019       Dear Dr. Lurdes Navarro:    Thank you for referring Cecile Bowen to me for evaluation. Attached you will find relevant portions of my assessment and plan of care.    If you have questions, please do not hesitate to call me. I look forward to following Cecile Bowen along with you.    Sincerely,    Samia Fulton MD    Enclosure  CC:  No Recipients    If you would like to receive this communication electronically, please contact [email or web address] or [phone] to request more information on [employer] access.    For providers and/or their staff who would like to refer a patient to [facility], please contact us through our one-stop-shop provider referral line, [facility], at [phone].    If you feel you have received this communication in error or would no longer like to receive these types of communications, please e-mail [email or web address]

## 2019-07-16 NOTE — H&P (VIEW-ONLY)
Breast Surgery  [facility]  Department of Surgery      REFERRING PROVIDER: Lurdes Navarro MD   [facility] [place] [address]    Chief Complaint: breast cancer      Subjective:      Patient ID: eCcile Bowen is a 69 y.o. 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    The patient initially underwent a screening mammogram on 2019 which was abnormal. Follow-up mammogram and ultrasound showed a mass at the upper outer anterior 2 o'clock position. 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     Of note, her history is significant for CKD with GFR in the mid 25.5 on . She has insulin dependent diabetes. Her last A1c was 6.7 on . She recently had an EKG on  which revealed sinus rhythm with LVH and TTE on  with EF of 55%.     Patient does routinely do self breast exams.  Patient has not noted a change on breast exam.  Patient denies nipple discharge. Patient admits to to previous breast biopsy in  which patient reports had precancerous cells. Patient denies a personal history of breast cancer.    Findings at that time were the following:   Tumor size: 17mm  Tumor ndgndrndanddndend:nd nd2nd Estrogen Receptor: pos (95%)  Progesterone Receptor: weakly pos (5%)  Her-2 nicholas: neg  Lymph node status: clinically neg  Lymphatic invasion: neg    GYN History:  Age of menarche was 14. Age of menopause was 36 (surgical).  Last menstrual period was 34. Patient admits to hormonal therapy. Patient is . Age of first pregnancy was 36.   She reports having a Right ooporectomy for cysts and 1/2 Left ooporectomy for a benign tumor    Family History:  Brother - Prostate cancer at 59, treated with surgery  PGM - Colon cancer  MGF - Lung cancer       Past Medical History:   Diagnosis Date    CKD (chronic kidney disease) stage 4, GFR 15-29 ml/min     [facility]     Hypertension 12/12/2012    Hypothyroidism 12/12/2012    Levoscoliosis     Migraine     Nuclear sclerosis - Both Eyes 3/24/2014    Obesity 12/12/2012     Past Surgical History:   Procedure Laterality Date    BLOCK-NERVE GENITOFEMORAL Left 2/20/2018    [facility]    BLOCK-NERVE-MEDIAL BRANCH-LUMBAR Bilateral 3/15/2018    [facility]formed [facility]    BLOCK-NERVE-MEDIAL BRANCH-LUMBAR Bilateral 2/20/2018    [facility]    BREAST BIOPSY Right     benign    CHOLECYSTECTOMY      COLONOSCOPY N/A 1/13/2017    [facility] by [facility] at [facility] ENDO (4TH FLR)    CYSTOSCOPY N/A 3/25/2019    Per[facility] by R[facility] at [facility] OR 1ST FLR    CYSTOSCOPY N/A 10/8/2018    [facility] at [facility] OR 1ST FLR    CYSTOSCOPY N/A 5/14/2018    [facility] at [facility] OR 1ST FLR    CYSTOSCOPY N/A 12/14/2015    [facility] at [facility] OR 1ST FLR    CYSTOSCOPY N/A 5/29/2015    [facility] at [facility] OR 2ND FLR    CYSTOSCOPY N/A 3/23/2015    [facility] at [facility] OR [address] [place] [address]    DILATION AND CURETTAGE OF UTERUS      EXCHANGE suprapubic catheter N/A 3/25/2019    [facility] at [facility] OR 1ST FLR    FLUORO URODYNAMIC STUDY (FUDS) N/A 3/23/2015    [facility] at [facility]    GALLBLADDER SURGERY  2006    HYSTERECTOMY      INJECTION, BOTULINUM TOXIN, TYPE A 300 UNITS N/A 3/25/2019    [facility] at [facility] OR 1ST FLR    INJECTION, BOTULINUM TOXIN, TYPE A 300 UNITS N/A 10/8/2018    [facility] at [facility] OR 1ST FLR    INJECTION-BOTOX N/A 5/14/2018    Performed by [facility] at [facility] OR 1ST FLR    INJECTION-BOTOX N/A 12/14/2015    Performed by [facility] MD at  "[facility] OR 1ST FLR    INJECTION-BOTOX N/A 5/29/2015    Performed by [facility] at [facility] OR 2ND FLR    INSERTION-CATHETER-SUPRAPUBIC CHANGE N/A 5/14/2018    Performed by Roenl Whittington MD at [facility] OR 1ST FLR    INSERTION-SUPRAPUBIC TUBE-OPEN N/A 5/29/2015    Performed by [facility] at [facility] OR 2ND FLR    OVARIAN CYST SURGERY  1985    RADIOFREQUENCY THERMOCOAGULATION (RFTC)-NERVE-MEDIAN BRANCH-LUMBAR Left 4/20/2018    [facility]    RADIOFREQUENCY THERMOCOAGULATION (RFTC)-NERVE-MEDIAN BRANCH-LUMBAR Right 4/2/2018    Per[facility] at [facility]    spt placement      TONSILLECTOMY, ADENOIDECTOMY      TOTAL ABDOMINAL HYSTERECTOMY W/ BILATERAL SALPINGOOPHORECTOMY  1985     Current Outpatient Medications on File Prior to Visit   Medication Sig Dispense Refill    BD INSULIN SYRINGE ULTRA-FINE 0.5 mL 31 gauge x 5/16" Syrg USE WITH INSULIN 4 TIMES A  each 12    blood sugar diagnostic Strp ONE TOUCH ULTRA TEST STRIPS//Check blood sugars  strip 6    blood-glucose meter kit ONE TOUCH ULTRA 1 each 0    butalbital-acetaminophen-caffeine -40 mg (FIORICET, ESGIC) -40 mg per tablet TAKE 1 TABLET BY MOUTH WHEN NOT CONTROLLED BY OTHER MEDICATIONS. NO MORE THAN 10 TABS PER 30 DAYS 10 tablet 0    cloNIDine (CATAPRES) 0.1 MG tablet Take 1 tablet (0.1 mg total) by mouth once. for 1 dose (Patient taking differently: Take 0.1 mg by mouth daily as needed. ) 30 tablet 3    cyanocobalamin, vitamin B-12, (VITAMIN B-12) 5,000 mcg Subl Place 1 tablet under the tongue once daily.      erenumab-aooe 140 mg/mL AtIn Inject 1 syringe (140 mg total) into the skin every 28 days. 1 mL 2    ergocalciferol (VITAMIN D2) 50,000 unit Cap TAKE ONE CAPSULE BY MOUTH ONE TIME PER WEEK 12 capsule 3    ferrous sulfate 325 (65 FE) MG EC tablet Take 325 mg by mouth once daily.       gemfibrozil (LOPID) 600 MG tablet TAKE 1 TABLET BY " "MOUTH EVERY OTHER DAY 60 tablet 2    HYDROcodone-acetaminophen (NORCO)  mg per tablet Take 1 tablet by mouth every 6 (six) hours as needed. 120 tablet 0    insulin (LANTUS SOLOSTAR U-100 INSULIN) glargine 100 units/mL (3mL) SubQ pen Inject 24 units daily. 15 mL 6    insulin lispro (HUMALOG U-100 INSULIN) 100 unit/mL injection Inject 5-7 units w/ meals plus scale 180-230+2, 231-280+4, 281-330+6, 331-380+8, >380 +10. 20 mL 6    lancets Misc Ultra 2 lancets to check blood sugar  each 6    magnesium oxide (MAG-OX) 400 mg (241.3 mg magnesium) tablet TAKE 1 TABLET (400 MG TOTAL) BY MOUTH 2 (TWO) TIMES DAILY. 180 tablet 1    methocarbamol (ROBAXIN) 750 MG Tab Take 1-2 tablets (750-1,500 mg total) by mouth 3 (three) times daily as needed. 180 tablet 3    multivitamin with minerals tablet Take 1 tablet by mouth once daily.      oxybutynin (DITROPAN-XL) 10 MG 24 hr tablet TAKE 1 TABLET (10 MG TOTAL) BY MOUTH ONCE DAILY. 30 tablet 9    pen needle, diabetic (BD ULTRA-FINE SHORT PEN NEEDLE) 31 gauge x 5/16" Ndle Uses w/ lantus daily. 90 day 100 each 3    pravastatin (PRAVACHOL) 40 MG tablet TAKE 1 TABLET BY MOUTH EVERY DAY 90 tablet 3    riboflavin, vitamin B2, 400 mg Tab Take 400 mg by mouth once daily. 90 tablet 3    rosuvastatin (CRESTOR) 40 MG Tab Take 40 mg by mouth every evening.      scopolamine (TRANSDERM-SCOP) 1.3-1.5 mg (1 mg over 3 days) Place 1 patch onto the skin every 72 hours. 4 patch 0    sodium bicarbonate 650 MG tablet TAKE 1 TABLET (650 MG TOTAL) BY MOUTH 3 (THREE) TIMES DAILY. (Patient taking differently: TAKE 1 TABLET (650 MG TOTAL) BY MOUTH 1 IN THE MORNING AND 2 TABLETS IN THE EVENING) 270 tablet 2    sumatriptan (IMITREX) 100 MG tablet TAKE 1 TABLET (100 MG TOTAL) BY MOUTH 2 (TWO) TIMES DAILY AS NEEDED FOR MIGRAINE. 9 tablet 6    SYNTHROID 50 mcg tablet TAKE 1 TABLET (50 MCG TOTAL) BY MOUTH ONCE DAILY. 30 tablet 6    topiramate (TOPAMAX) 200 MG Tab TAKE 1 TABLET BY MOUTH " TWICE A  tablet 2    traZODone (DESYREL) 100 MG tablet TAKE 1 TABLET BY MOUTH AT BEDTIME MAY TAKE AN EXTRA HALF TABLET IF NEEDED 45 tablet 12    venlafaxine (EFFEXOR-XR) 75 MG 24 hr capsule Take 2 capsules (150 mg total) by mouth once daily. 180 capsule 1     No current facility-administered medications on file prior to visit.      Social History     Socioeconomic History    Marital status:      Spouse name: Not on file    Number of children: Not on file    Years of education: Not on file    Highest education level: Not on file   Occupational History    Occupation: teacher     Comment: retired   Social Needs    Financial resource strain: Not on file    Food insecurity:     Worry: Not on file     Inability: Not on file    Transportation needs:     Medical: Not on file     Non-medical: Not on file   Tobacco Use    Smoking status: Never Smoker    Smokeless tobacco: Never Used   Substance and Sexual Activity    Alcohol use: No     Alcohol/week: 0.0 oz    Drug use: No    Sexual activity: Not Currently     Birth control/protection: Abstinence   Lifestyle    Physical activity:     Days per week: Not on file     Minutes per session: Not on file    Stress: Not on file   Relationships    Social connections:     Talks on phone: Not on file     Gets together: Not on file     Attends Advent service: Not on file     Active member of club or organization: Not on file     Attends meetings of clubs or organizations: Not on file     Relationship status: Not on file   Other Topics Concern    Not on file   Social History Narrative    Single ()        Lives w/ sister and brother. Pt needs to keep her narcotics hidden from her brother who will steal them          Family History   Problem Relation Age of Onset    Diabetes Sister     Kidney disease Sister         CKD III    ALS Mother         d.    Cancer Maternal Grandmother         d. colon    Cancer Paternal Grandfather         d. lung  "   Scoliosis Brother         increased pain    Prostate cancer Brother         cured s/p surgery    Diabetes Maternal Aunt     Kidney disease Maternal Aunt     Diabetes Maternal Uncle     Amblyopia Neg Hx     Blindness Neg Hx     Cataracts Neg Hx     Glaucoma Neg Hx     Macular degeneration Neg Hx     Retinal detachment Neg Hx     Strabismus Neg Hx         Review of Systems   Constitutional: Negative for chills, fever and weight loss.   HENT: Negative for ear discharge, nosebleeds and sinus pain.    Eyes: Negative for double vision, photophobia and discharge.   Respiratory: Negative for cough, sputum production and shortness of breath.    Cardiovascular: Negative for chest pain and palpitations.   Gastrointestinal: Negative for blood in stool, nausea and vomiting.   Skin: Negative for rash.   Neurological: Negative for tremors and focal weakness.   Endo/Heme/Allergies: Does not bruise/bleed easily.   Psychiatric/Behavioral: Negative for substance abuse. The patient is not nervous/anxious.      Objective:   BP (!) 142/87 (BP Location: Right arm, Patient Position: Sitting, BP Method: Large (Automatic))   Pulse 110   Temp 98 °F (36.7 °C) (Oral)   Ht 5' 8" (1.727 m)   Wt 133.8 kg (294 lb 15.6 oz)   BMI 44.85 kg/m²     Physical Exam   Constitutional: She appears well-developed and well-nourished.   HENT:   Head: Normocephalic.   Eyes: No scleral icterus.   Neck: Neck supple. No tracheal deviation present.   Cardiovascular: Normal rate and regular rhythm.    Pulmonary/Chest: Breath sounds normal. No respiratory distress. Right breast exhibits no inverted nipple, no mass, no nipple discharge and no skin change. Left breast exhibits no inverted nipple, no mass, no nipple discharge and no skin change.       Abdominal: Soft. She exhibits no mass. There is no tenderness.   Musculoskeletal: She exhibits no edema.   Lymphadenopathy:     She has no cervical adenopathy.   Neurological: She is alert.   Skin: No " rash noted. No erythema.     Psychiatric: She has a normal mood and affect.         Radiology review: Images personally reviewed by me in the clinic.   Left breast diagnostic ultrasound and MMG 6/28/2019  Findings:  This procedure was performed using tomosynthesis.  Computer-aided detection was utilized in the interpretation of this examination.      Mammo Digital Diagnostic Left w/ Jerome  US Breast Left Limited  The breast has scattered areas of fibroglandular density.     There is a high density, irregularly shaped mass with spiculated margins seen in the upper outer quadrant of the left breast at 2 o'clock in the anterior depth.   With ultrasound, this corresponds to a 17 mm irregularly shaped, hypoechoic mass with angular margins with shadowing seen in the upper outer quadrant of the left breast at 2 o'clock in the anterior depth. No left axillary adenopathy.           Assessment:       Left breast malignant neoplasm, upper outer quadrant, estrogen positive.  Plan:     Options for management were discussed with the patient and her family. We reviewed the existing data noting the equivalency of breast conserving surgery with radiation therapy and mastectomy. We also reviewed the guidelines of the National Comprehensive Cancer Network for Stage 1 breast carcinoma. We discussed the need for lumpectomy margins to be negative for carcinoma, the necessity for postoperative radiation therapy after breast conservation in most cases, the possibility of a failed or false negative sentinel lymph node biopsy and the potential need for complete lymphadenectomy for a failed or positive sentinel lymph node biopsy were fully discussed. In the setting of mastectomy, delayed or immediate reconstruction options are available and were discussed.     In the setting of lumpectomy, radiation therapy would be recommended majority of the time.  The duration and treatment side effects were discussed with the patient.  This will  coordinated with the radiation oncologist pending final pathology.    We also discussed the role of systemic therapy in the treatment of early stage breast cancer.  We discussed that this is based on tumor biology and danae status and will be determined based on final pathology.  We discussed that if the cancer is hormone positive, endocrine therapy would be recommended in most cases and its use can reduce the risk of recurrence as well as improve survival. Side effects of treatment were briefly discussed. We also discussed the potential role for chemotherapy based on a number of factors such as tumor phenotype (ER+ vs. triple negative vs. [id number]+) and this would be determined in coordination with the medical oncologist.    The patient, in consultation with her family, has elected to proceed with left total mastectomy, sentinel lymph node biopsy, and possible axillary dissection. The operative risks of bleeding, infection, recurrence, scarring, and anesthetic complications and the possibility of requiring further surgery were all noted and informed consent obtained.    We will have her follow up with her PCP to ensure she is medically optimized for surgery given her history of CKD and IDDM.    She is also interested in genetic testing. We will complete this today.    Surgery scheduled. Follow-up in clinic roughly 14 days after surgery.     Patient was educated on breast cancer, receptors, wire localization lumpectomy, mastectomy, sentinel lymph node mapping and biopsy, axillary lymph node dissection, reconstruction, breast prosthesis with post-mastectomy bra and radiation therapy. Patient was given patient information binder [facility].  All her questions were answered.    Total time spent with the patient: 60 minutes.  45 minutes of face to face consultation and 15 minutes of chart review and coordination of care.

## 2019-07-16 NOTE — PROGRESS NOTES
Breast Surgery  [facility]  Department of Surgery      REFERRING PROVIDER: Lurdes Navarro MD   [place] [place] [address]    Chief Complaint: breast cancer      Subjective:      Patient ID: Cecile Bowen is a 69 y.o. 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    The patient initially underwent a screening mammogram on 2019 which was abnormal. Follow-up mammogram and ultrasound showed a mass at the upper outer anterior 2 o'clock position. 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     Of note, her history is significant for CKD with GFR in the mid 25.5 on . She has insulin dependent diabetes. Her last A1c was 6.7 on . She recently had an EKG on  which revealed sinus rhythm with LVH and TTE on  with EF of 55%.     Patient does routinely do self breast exams.  Patient has not noted a change on breast exam.  Patient denies nipple discharge. Patient admits to to previous breast biopsy in  which patient reports had precancerous cells. Patient denies a personal history of breast cancer.    Findings at that time were the following:   Tumor size: 17mm  Tumor ndgndrndanddndend:nd nd2nd Estrogen Receptor: pos (95%)  Progesterone Receptor: weakly pos (5%)  Her-2 nicholas: neg  Lymph node status: clinically neg  Lymphatic invasion: neg    GYN History:  Age of menarche was 14. Age of menopause was 36 (surgical).  Last menstrual period was 34. Patient admits to hormonal therapy. Patient is . Age of first pregnancy was 36.   She reports having a Right ooporectomy for cysts and 1/2 Left ooporectomy for a benign tumor    Family History:  Brother - Prostate cancer at 59, treated with surgery  PGM - Colon cancer  MGF - Lung cancer       Past Medical History:   Diagnosis Date    CKD (chronic kidney disease) stage 4, GFR 15-29 ml/min     [facility]     Hypertension 12/12/2012    Hypothyroidism 12/12/2012    Levoscoliosis     Migraine     Nuclear sclerosis - Both Eyes 3/24/2014    Obesity 12/12/2012     Past Surgical History:   Procedure Laterality Date    BLOCK-NERVE GENITOFEMORAL Left 2/20/2018    [facility]    BLOCK-NERVE-MEDIAL BRANCH-LUMBAR Bilateral 3/15/2018    Performed by [facility]    BLOCK-NERVE-MEDIAL BRANCH-LUMBAR Bilateral 2/20/2018    [facility] by [facility]    BREAST BIOPSY Right     benign    CHOLECYSTECTOMY      COLONOSCOPY N/A 1/13/2017    Performed by [facility] ENDO (4TH FLR)    CYSTOSCOPY N/A 3/25/2019    Performed by Ronel Whittington [facility] at [facility] OR 1ST FLR    CYSTOSCOPY N/A 10/8/2018    Performed by [facility] at [facility] OR 1ST FLR    CYSTOSCOPY N/A 5/14/2018    Performed by [facility] at [facility] OR 1ST FLR    CYSTOSCOPY N/A 12/14/2015    Performed by [facility] at [facility] OR 1ST FLR    CYSTOSCOPY N/A 5/29/2015    Performed by [facility] at [facility] OR 2ND FLR    CYSTOSCOPY N/A 3/23/2015    Performed by [facility]ton MD at [facility] OR [address] [place] [address]    DILATION AND CURETTAGE OF UTERUS      EXCHANGE suprapubic catheter N/A 3/25/2019    Performed by [facility] at [facility] OR 1ST FLR    FLUORO URODYNAMIC STUDY (FUDS) N/A 3/23/2015    Per[facility] at [facility] 1ST [facility]    GALLBLADDER SURGERY  2006    HYSTERECTOMY      INJECTION, BOTULINUM TOXIN, TYPE A 300 UNITS N/A 3/25/2019    Performed by [facility] at [facility] OR 1ST FLR    INJECTION, BOTULINUM TOXIN, TYPE A 300 UNITS N/A 10/8/2018    Performed by [facility] at [facility] OR 1ST FLR    INJECTION-BOTOX N/A 5/14/2018    Performed by [facility] at [facility] OR 1ST FLR    INJECTION-BOTOX N/A 12/14/2015    Performed by Ronel [facility]ittington MD at  "[facility] OR 1ST FLR    INJECTION-BOTOX N/A 5/29/2015    Performed by [facility] at [facility] OR 2ND FLR    INSERTION-CATHETER-SUPRAPUBIC CHANGE N/A 5/14/2018    Performed by [facility] at [facility] OR 1ST FLR    INSERTION-SUPRAPUBIC TUBE-OPEN N/A 5/29/2015    Performed by [facility] at [facility] OR 2ND FLR    OVARIAN CYST SURGERY  1985    RADIOFREQUENCY THERMOCOAGULATION (RFTC)-NERVE-MEDIAN BRANCH-LUMBAR Left 4/20/2018    Performed by [facility]    RADIOFREQUENCY THERMOCOAGULATION (RFTC)-NERVE-MEDIAN BRANCH-LUMBAR Right 4/2/2018    Performed by [facility] at [facility]    spt placement      TONSILLECTOMY, ADENOIDECTOMY      TOTAL ABDOMINAL HYSTERECTOMY W/ BILATERAL SALPINGOOPHORECTOMY  1985     Current Outpatient Medications on File Prior to Visit   Medication Sig Dispense Refill    BD INSULIN SYRINGE ULTRA-FINE 0.5 mL 31 gauge x 5/16" Syrg USE WITH INSULIN 4 TIMES A  each 12    blood sugar diagnostic Strp ONE TOUCH ULTRA TEST STRIPS//Check blood sugars  strip 6    blood-glucose meter kit ONE TOUCH ULTRA 1 each 0    butalbital-acetaminophen-caffeine -40 mg (FIORICET, ESGIC) -40 mg per tablet TAKE 1 TABLET BY MOUTH WHEN NOT CONTROLLED BY OTHER MEDICATIONS. NO MORE THAN 10 TABS PER 30 DAYS 10 tablet 0    cloNIDine (CATAPRES) 0.1 MG tablet Take 1 tablet (0.1 mg total) by mouth once. for 1 dose (Patient taking differently: Take 0.1 mg by mouth daily as needed. ) 30 tablet 3    cyanocobalamin, vitamin B-12, (VITAMIN B-12) 5,000 mcg Subl Place 1 tablet under the tongue once daily.      erenumab-aooe 140 mg/mL AtIn Inject 1 syringe (140 mg total) into the skin every 28 days. 1 mL 2    ergocalciferol (VITAMIN D2) 50,000 unit Cap TAKE ONE CAPSULE BY MOUTH ONE TIME PER WEEK 12 capsule 3    ferrous sulfate 325 (65 FE) MG EC tablet Take 325 mg by mouth once daily.       gemfibrozil (LOPID) 600 MG tablet TAKE 1 TABLET BY " "MOUTH EVERY OTHER DAY 60 tablet 2    HYDROcodone-acetaminophen (NORCO)  mg per tablet Take 1 tablet by mouth every 6 (six) hours as needed. 120 tablet 0    insulin (LANTUS SOLOSTAR U-100 INSULIN) glargine 100 units/mL (3mL) SubQ pen Inject 24 units daily. 15 mL 6    insulin lispro (HUMALOG U-100 INSULIN) 100 unit/mL injection Inject 5-7 units w/ meals plus scale 180-230+2, 231-280+4, 281-330+6, 331-380+8, >380 +10. 20 mL 6    lancets Misc Ultra 2 lancets to check blood sugar  each 6    magnesium oxide (MAG-OX) 400 mg (241.3 mg magnesium) tablet TAKE 1 TABLET (400 MG TOTAL) BY MOUTH 2 (TWO) TIMES DAILY. 180 tablet 1    methocarbamol (ROBAXIN) 750 MG Tab Take 1-2 tablets (750-1,500 mg total) by mouth 3 (three) times daily as needed. 180 tablet 3    multivitamin with minerals tablet Take 1 tablet by mouth once daily.      oxybutynin (DITROPAN-XL) 10 MG 24 hr tablet TAKE 1 TABLET (10 MG TOTAL) BY MOUTH ONCE DAILY. 30 tablet 9    pen needle, diabetic (BD ULTRA-FINE SHORT PEN NEEDLE) 31 gauge x 5/16" Ndle Uses w/ lantus daily. 90 day 100 each 3    pravastatin (PRAVACHOL) 40 MG tablet TAKE 1 TABLET BY MOUTH EVERY DAY 90 tablet 3    riboflavin, vitamin B2, 400 mg Tab Take 400 mg by mouth once daily. 90 tablet 3    rosuvastatin (CRESTOR) 40 MG Tab Take 40 mg by mouth every evening.      scopolamine (TRANSDERM-SCOP) 1.3-1.5 mg (1 mg over 3 days) Place 1 patch onto the skin every 72 hours. 4 patch 0    sodium bicarbonate 650 MG tablet TAKE 1 TABLET (650 MG TOTAL) BY MOUTH 3 (THREE) TIMES DAILY. (Patient taking differently: TAKE 1 TABLET (650 MG TOTAL) BY MOUTH 1 IN THE MORNING AND 2 TABLETS IN THE EVENING) 270 tablet 2    sumatriptan (IMITREX) 100 MG tablet TAKE 1 TABLET (100 MG TOTAL) BY MOUTH 2 (TWO) TIMES DAILY AS NEEDED FOR MIGRAINE. 9 tablet 6    SYNTHROID 50 mcg tablet TAKE 1 TABLET (50 MCG TOTAL) BY MOUTH ONCE DAILY. 30 tablet 6    topiramate (TOPAMAX) 200 MG Tab TAKE 1 TABLET BY MOUTH " TWICE A  tablet 2    traZODone (DESYREL) 100 MG tablet TAKE 1 TABLET BY MOUTH AT BEDTIME MAY TAKE AN EXTRA HALF TABLET IF NEEDED 45 tablet 12    venlafaxine (EFFEXOR-XR) 75 MG 24 hr capsule Take 2 capsules (150 mg total) by mouth once daily. 180 capsule 1     No current facility-administered medications on file prior to visit.      Social History     Socioeconomic History    Marital status:      Spouse name: Not on file    Number of children: Not on file    Years of education: Not on file    Highest education level: Not on file   Occupational History    Occupation: teacher     Comment: retired   Social Needs    Financial resource strain: Not on file    Food insecurity:     Worry: Not on file     Inability: Not on file    Transportation needs:     Medical: Not on file     Non-medical: Not on file   Tobacco Use    Smoking status: Never Smoker    Smokeless tobacco: Never Used   Substance and Sexual Activity    Alcohol use: No     Alcohol/week: 0.0 oz    Drug use: No    Sexual activity: Not Currently     Birth control/protection: Abstinence   Lifestyle    Physical activity:     Days per week: Not on file     Minutes per session: Not on file    Stress: Not on file   Relationships    Social connections:     Talks on phone: Not on file     Gets together: Not on file     Attends Confucianist service: Not on file     Active member of club or organization: Not on file     Attends meetings of clubs or organizations: Not on file     Relationship status: Not on file   Other Topics Concern    Not on file   Social History Narrative    Single ()        Lives w/ sister and brother. Pt needs to keep her narcotics hidden from her brother who will steal them          Family History   Problem Relation Age of Onset    Diabetes Sister     Kidney disease Sister         CKD III    ALS Mother         d.    Cancer Maternal Grandmother         d. colon    Cancer Paternal Grandfather         d. lung  "   Scoliosis Brother         increased pain    Prostate cancer Brother         cured s/p surgery    Diabetes Maternal Aunt     Kidney disease Maternal Aunt     Diabetes Maternal Uncle     Amblyopia Neg Hx     Blindness Neg Hx     Cataracts Neg Hx     Glaucoma Neg Hx     Macular degeneration Neg Hx     Retinal detachment Neg Hx     Strabismus Neg Hx         Review of Systems   Constitutional: Negative for chills, fever and weight loss.   HENT: Negative for ear discharge, nosebleeds and sinus pain.    Eyes: Negative for double vision, photophobia and discharge.   Respiratory: Negative for cough, sputum production and shortness of breath.    Cardiovascular: Negative for chest pain and palpitations.   Gastrointestinal: Negative for blood in stool, nausea and vomiting.   Skin: Negative for rash.   Neurological: Negative for tremors and focal weakness.   Endo/Heme/Allergies: Does not bruise/bleed easily.   Psychiatric/Behavioral: Negative for substance abuse. The patient is not nervous/anxious.      Objective:   BP (!) 142/87 (BP Location: Right arm, Patient Position: Sitting, BP Method: Large (Automatic))   Pulse 110   Temp 98 °F (36.7 °C) (Oral)   Ht 5' 8" (1.727 m)   Wt 133.8 kg (294 lb 15.6 oz)   BMI 44.85 kg/m²     Physical Exam   Constitutional: She appears well-developed and well-nourished.   HENT:   Head: Normocephalic.   Eyes: No scleral icterus.   Neck: Neck supple. No tracheal deviation present.   Cardiovascular: Normal rate and regular rhythm.    Pulmonary/Chest: Breath sounds normal. No respiratory distress. Right breast exhibits no inverted nipple, no mass, no nipple discharge and no skin change. Left breast exhibits no inverted nipple, no mass, no nipple discharge and no skin change.       Abdominal: Soft. She exhibits no mass. There is no tenderness.   Musculoskeletal: She exhibits no edema.   Lymphadenopathy:     She has no cervical adenopathy.   Neurological: She is alert.   Skin: No " rash noted. No erythema.     Psychiatric: She has a normal mood and affect.         Radiology review: Images personally reviewed by me in the clinic.   Left breast diagnostic ultrasound and MMG 6/28/2019  Findings:  This procedure was performed using tomosynthesis.  Computer-aided detection was utilized in the interpretation of this examination.      Mammo Digital Diagnostic Left w/ Jerome  US Breast Left Limited  The breast has scattered areas of fibroglandular density.     There is a high density, irregularly shaped mass with spiculated margins seen in the upper outer quadrant of the left breast at 2 o'clock in the anterior depth.   With ultrasound, this corresponds to a 17 mm irregularly shaped, hypoechoic mass with angular margins with shadowing seen in the upper outer quadrant of the left breast at 2 o'clock in the anterior depth. No left axillary adenopathy.           Assessment:       Left breast malignant neoplasm, upper outer quadrant, estrogen positive.  Plan:     Options for management were discussed with the patient and her family. We reviewed the existing data noting the equivalency of breast conserving surgery with radiation therapy and mastectomy. We also reviewed the guidelines of the National Comprehensive Cancer Network for Stage 1 breast carcinoma. We discussed the need for lumpectomy margins to be negative for carcinoma, the necessity for postoperative radiation therapy after breast conservation in most cases, the possibility of a failed or false negative sentinel lymph node biopsy and the potential need for complete lymphadenectomy for a failed or positive sentinel lymph node biopsy were fully discussed. In the setting of mastectomy, delayed or immediate reconstruction options are available and were discussed.     In the setting of lumpectomy, radiation therapy would be recommended majority of the time.  The duration and treatment side effects were discussed with the patient.  This will  coordinated with the radiation oncologist pending final pathology.    We also discussed the role of systemic therapy in the treatment of early stage breast cancer.  We discussed that this is based on tumor biology and danae status and will be determined based on final pathology.  We discussed that if the cancer is hormone positive, endocrine therapy would be recommended in most cases and its use can reduce the risk of recurrence as well as improve survival. Side effects of treatment were briefly discussed. We also discussed the potential role for chemotherapy based on a number of factors such as tumor phenotype (ER+ vs. triple negative vs. Lba2rhh+) and this would be determined in coordination with the medical oncologist.    The patient, in consultation with her family, has elected to proceed with left total mastectomy, sentinel lymph node biopsy, and possible axillary dissection. The operative risks of bleeding, infection, recurrence, scarring, and anesthetic complications and the possibility of requiring further surgery were all noted and informed consent obtained.    We will have her follow up with her PCP to ensure she is medically optimized for surgery given her history of CKD and IDDM.    She is also interested in genetic testing. We will complete this today.    Surgery scheduled. Follow-up in clinic roughly 14 days after surgery.     Patient was educated on breast cancer, receptors, wire localization lumpectomy, mastectomy, sentinel lymph node mapping and biopsy, axillary lymph node dissection, reconstruction, breast prosthesis with post-mastectomy bra and radiation therapy. Patient was given patient information binder [facility].  All her questions were answered.    Total time spent with the patient: 60 minutes.  45 minutes of face to face consultation and 15 minutes of chart review and coordination of care.

## 2019-07-16 NOTE — PROGRESS NOTES
Breast Surgery  [facility]  Department of Surgery      REFERRING PROVIDER: Lurdes Navarro MD  [address]  [place] [address]    Chief Complaint: No chief complaint on file.      Subjective:      Patient ID: Cecile Bowen is a 69 y.o. 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    The patient initially underwent a screening mammogram on 6/19/2019 which was abnormal. Follow-up mammogram and ultrasound showed a mass at the upper outer anterior 2 o'clock position. 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     Patient {does/does not:40424} routinely do self breast exams.  Patient {hpi assoc has/has/not:46031} noted a change on breast exam.  Patient {denies/admits to:5300} nipple discharge. Patient {denies/admits to:5300} to previous breast biopsy. Patient {denies/admits to:5300} a personal history of breast cancer.    Findings at that time were the following:   Tumor size: 17mm  Tumor ndgndrndanddndend:nd nd2nd Estrogen Receptor: pos (95%)  Progesterone Receptor: weakly pos (5%)  Her-2 nicholas: neg  Lymph node status: clinically neg  Lymphatic invasion: neg    GYN History:  Age of menarche was {numbers; 8-17:62393}. Age of menopause was ***.  Last menstrual period was ***. Patient {Actions; denies/admits to:5300} hormonal therapy. Patient is G{numbers; 0-10:03127}P{numbers; 0-10:52401}. Age of first live birth was ***. Patient {Desc; did/not:78576} breast feed.    Past Medical History:   Diagnosis Date    CKD (chronic kidney disease) stage 4, GFR 15-29 ml/min     [facility]    Hypertension 12/12/2012    Hypothyroidism 12/12/2012    Levoscoliosis     Migraine     Nuclear sclerosis - Both Eyes 3/24/2014    Obesity 12/12/2012     Past Surgical History:   Procedure Laterality Date    BLOCK-NERVE GENITOFEMORAL Left 2/20/2018    Performed by [facility]  at [facility]    BLOCK-NERVE-MEDIAL BRANCH-LUMBAR Bilateral 3/15/2018    [facility]    BLOCK-NERVE-MEDIAL BRANCH-LUMBAR Bilateral 2/20/2018    Performed by [facility] at [facility]    BREAST BIOPSY Right     benign    CHOLECYSTECTOMY      COLONOSCOPY N/A 1/13/2017    Performed by [facility] at [facility] ENDO (4TH FLR)    CYSTOSCOPY N/A 3/25/2019    Performed by Ronel Whittington MD at [facility] OR 1ST FLR    CYSTOSCOPY N/A 10/8/2018    Performed by [facility] at [facility] OR 1ST FLR    CYSTOSCOPY N/A 5/14/2018    Performed by [facility] at [facility] OR 1ST FLR    CYSTOSCOPY N/A 12/14/2015    Performed by [facility] at [facility] OR 1ST FLR    CYSTOSCOPY N/A 5/29/2015    Performed by [facility] MD at [facility] OR 2ND FLR    CYSTOSCOPY N/A 3/23/2015    Performed by [facility] at [facility] OR 1ST FLR    DILATION AND CURETTAGE OF UTERUS      EXCHANGE suprapubic catheter N/A 3/25/2019    Performed by R[facility] at [facility] OR 1ST FLR    FLUORO URODYNAMIC STUDY (FUDS) N/A 3/23/2015    Performed by [facility] at [facility] OR 1ST FLR    GALLBLADDER SURGERY  2006    HYSTERECTOMY      INJECTION, BOTULINUM TOXIN, TYPE A 300 UNITS N/A 3/25/2019    Performed by [facility] at [facility] OR 1ST FLR    INJECTION, BOTULINUM TOXIN, TYPE A 300 UNITS N/A 10/8/2018    Performed by [facility] at [facility] OR 1ST FLR    INJECTION-BOTOX N/A 5/14/2018    Performed by [facility] at [facility] OR 1ST FLR    INJECTION-BOTOX N/A 12/14/2015    Performed by [facility] at [facility] OR 1ST FLR    INJECTION-BOTOX N/A 5/29/2015    Performed by [facility] MD at [facility] OR 2ND FLR    INSERTION-CATHETER-SUPRAPUBIC CHANGE N/A 5/14/2018    Performed by [facility] at [facility] OR 1ST FLR    INSERTION-SUPRAPUBIC TUBE-OPEN N/A 5/29/2015    Performed by  "[facility] at [facility] 2ND FLR    OVARIAN CYST SURGERY  1985    RADIOFREQUENCY THERMOCOAGULATION (RFTC)-NERVE-MEDIAN BRANCH-LUMBAR Left 4/20/2018    [facility]    RADIOFREQUENCY THERMOCOAGULATION (RFTC)-NERVE-MEDIAN BRANCH-LUMBAR Right 4/2/2018    Performed by [facility] at [facility]    spt placement      TONSILLECTOMY, ADENOIDECTOMY      TOTAL ABDOMINAL HYSTERECTOMY W/ BILATERAL SALPINGOOPHORECTOMY  1985     Current Outpatient Medications on File Prior to Visit   Medication Sig Dispense Refill    BD INSULIN SYRINGE ULTRA-FINE 0.5 mL 31 gauge x 5/16" Syrg USE WITH INSULIN 4 TIMES A  each 12    blood sugar diagnostic Strp ONE TOUCH ULTRA TEST STRIPS//Check blood sugars  strip 6    blood-glucose meter kit ONE TOUCH ULTRA 1 each 0    butalbital-acetaminophen-caffeine -40 mg (FIORICET, ESGIC) -40 mg per tablet TAKE 1 TABLET BY MOUTH WHEN NOT CONTROLLED BY OTHER MEDICATIONS. NO MORE THAN 10 TABS PER 30 DAYS 10 tablet 0    cloNIDine (CATAPRES) 0.1 MG tablet Take 1 tablet (0.1 mg total) by mouth once. for 1 dose (Patient taking differently: Take 0.1 mg by mouth daily as needed. ) 30 tablet 3    cyanocobalamin, vitamin B-12, (VITAMIN B-12) 5,000 mcg Subl Place 1 tablet under the tongue once daily.      erenumab-aooe 140 mg/mL AtIn Inject 1 syringe (140 mg total) into the skin every 28 days. 1 mL 2    ergocalciferol (VITAMIN D2) 50,000 unit Cap TAKE ONE CAPSULE BY MOUTH ONE TIME PER WEEK 12 capsule 3    ferrous sulfate 325 (65 FE) MG EC tablet Take 325 mg by mouth once daily.       gemfibrozil (LOPID) 600 MG tablet TAKE 1 TABLET BY MOUTH EVERY OTHER DAY 60 tablet 2    HYDROcodone-acetaminophen (NORCO)  mg per tablet Take 1 tablet by mouth every 6 (six) hours as needed. 120 tablet 0    insulin (LANTUS SOLOSTAR U-100 INSULIN) glargine 100 units/mL (3mL) SubQ pen Inject 24 units daily. 15 mL 6    insulin lispro " "(HUMALOG U-100 INSULIN) 100 unit/mL injection Inject 5-7 units w/ meals plus scale 180-230+2, 231-280+4, 281-330+6, 331-380+8, >380 +10. 20 mL 6    lancets Misc Ultra 2 lancets to check blood sugar  each 6    magnesium oxide (MAG-OX) 400 mg (241.3 mg magnesium) tablet TAKE 1 TABLET (400 MG TOTAL) BY MOUTH 2 (TWO) TIMES DAILY. 180 tablet 1    methocarbamol (ROBAXIN) 750 MG Tab Take 1-2 tablets (750-1,500 mg total) by mouth 3 (three) times daily as needed. 180 tablet 3    multivitamin with minerals tablet Take 1 tablet by mouth once daily.      oxybutynin (DITROPAN-XL) 10 MG 24 hr tablet TAKE 1 TABLET (10 MG TOTAL) BY MOUTH ONCE DAILY. 30 tablet 9    pen needle, diabetic (BD ULTRA-FINE SHORT PEN NEEDLE) 31 gauge x 5/16" Ndle Uses w/ lantus daily. 90 day 100 each 3    pravastatin (PRAVACHOL) 40 MG tablet TAKE 1 TABLET BY MOUTH EVERY DAY 90 tablet 3    riboflavin, vitamin B2, 400 mg Tab Take 400 mg by mouth once daily. 90 tablet 3    rosuvastatin (CRESTOR) 40 MG Tab Take 40 mg by mouth every evening.      scopolamine (TRANSDERM-SCOP) 1.3-1.5 mg (1 mg over 3 days) Place 1 patch onto the skin every 72 hours. 4 patch 0    sodium bicarbonate 650 MG tablet TAKE 1 TABLET (650 MG TOTAL) BY MOUTH 3 (THREE) TIMES DAILY. (Patient taking differently: TAKE 1 TABLET (650 MG TOTAL) BY MOUTH 1 IN THE MORNING AND 2 TABLETS IN THE EVENING) 270 tablet 2    sumatriptan (IMITREX) 100 MG tablet TAKE 1 TABLET (100 MG TOTAL) BY MOUTH 2 (TWO) TIMES DAILY AS NEEDED FOR MIGRAINE. 9 tablet 6    SYNTHROID 50 mcg tablet TAKE 1 TABLET (50 MCG TOTAL) BY MOUTH ONCE DAILY. 30 tablet 6    topiramate (TOPAMAX) 200 MG Tab TAKE 1 TABLET BY MOUTH TWICE A  tablet 2    traZODone (DESYREL) 100 MG tablet TAKE 1 TABLET BY MOUTH AT BEDTIME MAY TAKE AN EXTRA HALF TABLET IF NEEDED 45 tablet 12    venlafaxine (EFFEXOR-XR) 75 MG 24 hr capsule Take 2 capsules (150 mg total) by mouth once daily. 180 capsule 1     No current " facility-administered medications on file prior to visit.      Social History     Socioeconomic History    Marital status:      Spouse name: Not on file    Number of children: Not on file    Years of education: Not on file    Highest education level: Not on file   Occupational History    Occupation: teacher     Comment: retired   Social Needs    Financial resource strain: Not on file    Food insecurity:     Worry: Not on file     Inability: Not on file    Transportation needs:     Medical: Not on file     Non-medical: Not on file   Tobacco Use    Smoking status: Never Smoker    Smokeless tobacco: Never Used   Substance and Sexual Activity    Alcohol use: No     Alcohol/week: 0.0 oz    Drug use: No    Sexual activity: Not Currently     Birth control/protection: Abstinence   Lifestyle    Physical activity:     Days per week: Not on file     Minutes per session: Not on file    Stress: Not on file   Relationships    Social connections:     Talks on phone: Not on file     Gets together: Not on file     Attends Taoist service: Not on file     Active member of club or organization: Not on file     Attends meetings of clubs or organizations: Not on file     Relationship status: Not on file   Other Topics Concern    Not on file   Social History Narrative    Single ()        Lives w/ sister and brother. Pt needs to keep her narcotics hidden from her brother who will steal them          Family History   Problem Relation Age of Onset    Diabetes Sister     Kidney disease Sister         CKD III    ALS Mother         d.    Cancer Maternal Grandmother         d. colon    Cancer Paternal Grandfather         d. lung    Scoliosis Brother         increased pain    Prostate cancer Brother         cured s/p surgery    Diabetes Maternal Aunt     Kidney disease Maternal Aunt     Diabetes Maternal Uncle     Amblyopia Neg Hx     Blindness Neg Hx     Cataracts Neg Hx     Glaucoma Neg Hx      Macular degeneration Neg Hx     Retinal detachment Neg Hx     Strabismus Neg Hx         Review of Systems  Objective:   There were no vitals taken for this visit.    Physical Exam    Radiology review: Images personally reviewed by me in the clinic.   Mammogram:***  Ultrasound:***  Assessment:       No diagnosis found.    Plan:     Options for management were discussed with the patient and her family. We reviewed the existing data noting the equivalency of breast conserving surgery with radiation therapy and mastectomy. We also reviewed the guidelines of the National Comprehensive Cancer Network for Stage *** breast carcinoma. We discussed the need for lumpectomy margins to be negative for carcinoma, the necessity for postoperative radiation therapy after breast conservation in most cases, the possibility of a failed or false negative sentinel lymph node biopsy and the potential need for complete lymphadenectomy for a failed or positive sentinel lymph node biopsy were fully discussed. In the setting of mastectomy, delayed or immediate reconstruction options are available and were discussed.     In the setting of lumpectomy, radiation therapy would be recommended majority of the time.  The duration and treatment side effects were discussed with the patient.  This will coordinated with the radiation oncologist pending final pathology.    We also discussed the role of systemic therapy in the treatment of early stage breast cancer.  We discussed that this is based on tumor biology and danae status and will be determined based on final pathology.  We discussed that if the cancer is hormone positive, endocrine therapy would be recommended in most cases and its use can reduce the risk of recurrence as well as improve survival. Side effects of treatment were briefly discussed. We also discussed the potential role for chemotherapy based on a number of factors such as tumor phenotype (ER+ vs. triple negative vs. [id number]+)  and this would be determined in coordination with the medical oncologist.    The patient, in consultation with her family, has elected to proceed with {partial/total:85007} {mastectomy procedure:72574}. The operative risks of bleeding, infection, recurrence, scarring, and anesthetic complications and the possibility of requiring further surgery were all noted and informed consent obtained.    Surgery scheduled. Follow-up in clinic roughly *** days after surgery.     Patient was educated on breast cancer, receptors, wire localization lumpectomy, mastectomy, sentinel lymph node mapping and biopsy, axillary lymph node dissection, reconstruction, breast prosthesis with post-mastectomy bra and radiation therapy. Patient was given patient information binder including [facility] breast cancer treatment brochure.  All her questions were answered.    Total time spent with the patient: *** minutes.  *** minutes of face to face consultation and *** minutes of chart review and coordination of care.

## 2019-07-18 DIAGNOSIS — C50.412 MALIGNANT NEOPLASM OF UPPER-OUTER QUADRANT OF LEFT BREAST IN FEMALE, ESTROGEN RECEPTOR POSITIVE: Primary | ICD-10-CM

## 2019-07-18 DIAGNOSIS — Z17.0 MALIGNANT NEOPLASM OF UPPER-OUTER QUADRANT OF LEFT BREAST IN FEMALE, ESTROGEN RECEPTOR POSITIVE: Primary | ICD-10-CM

## 2019-07-19 PROBLEM — C50.412 MALIGNANT NEOPLASM OF UPPER-OUTER QUADRANT OF LEFT BREAST IN FEMALE, ESTROGEN RECEPTOR POSITIVE: Status: ACTIVE | Noted: 2019-07-19

## 2019-07-19 PROBLEM — Z17.0 MALIGNANT NEOPLASM OF UPPER-OUTER QUADRANT OF LEFT BREAST IN FEMALE, ESTROGEN RECEPTOR POSITIVE: Status: ACTIVE | Noted: 2019-07-19

## 2019-07-23 DIAGNOSIS — Z17.0 MALIGNANT NEOPLASM OF UPPER-OUTER QUADRANT OF LEFT BREAST IN FEMALE, ESTROGEN RECEPTOR POSITIVE: Primary | ICD-10-CM

## 2019-07-23 DIAGNOSIS — C50.412 MALIGNANT NEOPLASM OF UPPER-OUTER QUADRANT OF LEFT BREAST IN FEMALE, ESTROGEN RECEPTOR POSITIVE: Primary | ICD-10-CM

## 2019-07-24 ENCOUNTER — PATIENT MESSAGE (OUTPATIENT)
Dept: NEPHROLOGY | Facility: CLINIC | Age: 69
End: 2019-07-24

## 2019-07-24 ENCOUNTER — CLINICAL SUPPORT (OUTPATIENT)
Dept: REHABILITATION | Facility: HOSPITAL | Age: 69
End: 2019-07-24
Attending: SURGERY
Payer: MEDICARE

## 2019-07-24 ENCOUNTER — PATIENT MESSAGE (OUTPATIENT)
Dept: SURGERY | Facility: HOSPITAL | Age: 69
End: 2019-07-24

## 2019-07-24 DIAGNOSIS — Z91.89 AT RISK FOR LYMPHEDEMA: ICD-10-CM

## 2019-07-24 PROBLEM — M54.50 CHRONIC BILATERAL LOW BACK PAIN WITHOUT SCIATICA: Status: RESOLVED | Noted: 2018-05-16 | Resolved: 2019-07-24

## 2019-07-24 PROBLEM — G89.29 CHRONIC BILATERAL LOW BACK PAIN WITHOUT SCIATICA: Status: RESOLVED | Noted: 2018-05-16 | Resolved: 2019-07-24

## 2019-07-24 PROBLEM — R53.1 WEAKNESS GENERALIZED: Status: RESOLVED | Noted: 2018-05-16 | Resolved: 2019-07-24

## 2019-07-24 PROCEDURE — G8984 CARRY CURRENT STATUS: HCPCS | Mod: CH

## 2019-07-24 PROCEDURE — G8986 CARRY D/C STATUS: HCPCS | Mod: CH

## 2019-07-24 PROCEDURE — G8985 CARRY GOAL STATUS: HCPCS | Mod: CH

## 2019-07-24 PROCEDURE — 97161 PT EVAL LOW COMPLEX 20 MIN: CPT

## 2019-07-24 NOTE — PATIENT INSTRUCTIONS
PRE/POST OP PATIENT EDUCATION    Post Operative Instructions     Range of Motion/lifting Precautions post surgery  The following activities should be avoided until your drain(s) have been removed  - do not lift affected arm above 90 degrees of shoulder flexion  - do not lift over 5 lbs  - do not pull or push heavy objects  - do not sleep on your stomach or surgery side       After surgery, you may begin self-care tasks including grooming, dressing, feeding and simple hygiene as soon as you feel up to it.    Schedule your post-op therapy follow-up after your drains have been removed     When to call your doctor   - if any part of your affected arm or axilla feels hot, is reddened or has increased swelling   - if you develop a temperature over 101 degrees Fahrenheit      Lymphedema - Identification and Prevention     Lymphedema - is the swelling of a body area or extremity caused by the accumulation of lymphatic fluid.  There is a risk for lymphedema with the removal of lymph nodes, trauma or radiation therapy.  Treatment of breast cancer often involves surgery: mastectomy or lumpectomy. Some of the lymph nodes in the underarm (called axillary lymph nodes) may be removed and checked to see if they contain cancer cells.     During breast surgery when axillary lymph nodes are removed (with sentinel node biopsy or axillary dissection) or are treated with radiation therapy, the lymphatic system may become impaired. This may prevent lymphatic fluid from leaving the area therefore, causing lymphedema.     Lymphatic fluid is a normal part of the circulatory system. Its function is to remove waste products and to produce cells vital to fighting infection. Swelling occurs when the vessels become restricted and the lymphatic fluid is unable to freely flow through them.  If lymphedema is left untreated, the affected limb could progressively become more swollen, which could lead to hardening  of the skin, bulkiness in the limb, infection and impaired wound healing.         There are things you can do to decrease the chance of developing lymphedema.                                          [email or web address]                                                                                                                                                  The information presented is intended for general information and educational purposes. It is not intended to replace the  advice of your health care provider. Contact your health care provider if you believe you have a health problem.                                                    POST OP EXERCISES - SAFE TO DO THE FIRST 2 WEEKS AFTER SURGERY UNTIL YOUR FOLLOW UP APPOINTMENT WITH PHYSICAL/OCCUPATIONAL THERAPY    Scapular Retraction (Standing)    With arms at sides, squeeze shoulder blades together. Do not shrug shoulders and do not hold your breath. Hold 5 seconds. Repeat 10 times 2 sessions 2 x day.       Exaggerated Breathing and Relaxation      Practice deep breathing frequently in the first few days following surgery even before you begin exercising. This exercise helps with tissue extensibility in the chest wall.  Inhale slowly and deeply through the nose and exhale through pursed lips. Concentrate on relaxing as you let the air out of your lungs. Repeat three (3) to four (4) times, remembering to breath in deeply and then relaxing. This exercise helps to ease the sensation of pulling and discomfort that may be experienced while exercising.      Ball Squeeze OR Hand pumps       Perform this exercise three (3) times a day for 1-3 minutes each time.    The ball squeeze or hand pumps helps to prevent or reduce temporary swelling that may occur in the affected arm. This exercise may be performed standing, sitting or while lying in bed. During this exercise the affected arm should be slightly bent and held upward. Support your arm with a  pillow if you are uncomfortable holding it up.    a. Hold a rubber ball in your hand on the affected side and lift your arm upward.  b. Alternate squeezing and relaxing the ball.            AROM: Elbow Flexion / Extension        With left hand palm up, gently bend elbow as far as possible. Then straighten arm as far as possible. Do this in standing.   Repeat 10 times per set. Do 2 sets per session. Do 1-3 sessions per day.

## 2019-07-24 NOTE — PLAN OF CARE
OUTPATIENT PHYSICAL THERAPY   EVALUATION    Name: Cecile Bowen  Clinic Number: [id number]    Therapy Diagnosis:   Encounter Diagnosis   Name Primary?    At risk for lymphedema      Physician: Samia Fulton MD    Physician Orders: PT Eval and Treat   Medical Diagnosis: Left breast malignant neoplasm, upper outer quadrant, estrogen positive.  Evaluation Date: 7/24/2019  Authorization period Expiration: 7/17/20  Plan of Care Certification Period: 7/24/19  Insurance: [facility], Medicare    Visit #: 1/ Visits authorized: 1  Time In:13:18 (pt with late arrival)  Time Out: 14:08  Total Billable Time: 50 minutes    Precautions: Standard and Fall    History   History of Present Illness: Cecile is a 69 y.o. female that presents to  [facility] at the [facility] secondary to dx of L breast cancer.  Pt to have L TM with SLNB and potential ALND.  Pt states she feels strongly that she will not need radiation or chemo after, but this is to be determined per surgeon's note.  Dx: Left breast malignant neoplasm, upper outer quadrant, estrogen positive.  Surgery date: 8/1/19      Pt presents today for baseline measurements to aid in the early detection of lymphedema, UE muscle testing, postural and ROM assessment along with education of risk of lymphedema and surgical precautions post surgery. Circumferential measurements will be taken today of BL UEs for early detection of lymphedema post surgery. Pt will also be instructed in exercises to perform pre and post-surgery to insure best outcomes.     Past Medical History:   Past Medical History:   Diagnosis Date    CKD (chronic kidney disease) stage 4, GFR 15-29 ml/min     [facility]    Hypertension 12/12/2012    Hypothyroidism 12/12/2012    Levoscoliosis     Migraine     Nuclear sclerosis - Both Eyes 3/24/2014    Obesity 12/12/2012       Past Surgical History:   Cecile Bowen  has a past surgical history that includes Total abdominal  "hysterectomy w/ bilateral salpingoophorectomy (1985); Gallbladder surgery (2006); TONSILLECTOMY, ADENOIDECTOMY; Ovarian cyst surgery (1985); Dilation and curettage of uterus; Cholecystectomy; spt placement; Breast biopsy (Right); Colonoscopy (N/A, 1/13/2017); Cystoscopy (N/A, 10/8/2018); Injection of botulinum toxin type A (N/A, 10/8/2018); Cystoscopy (N/A, 3/25/2019); Injection of botulinum toxin type A (N/A, 3/25/2019); and Hysterectomy.    Medications:  Cecile has a current medication list which includes the following prescription(s): bd insulin syringe ultra-fine, blood sugar diagnostic, blood-glucose meter, butalbital-acetaminophen-caffeine -40 mg, clonidine, cyanocobalamin (vitamin b-12), erenumab-aooe, ergocalciferol, ferrous sulfate, gemfibrozil, hydrocodone-acetaminophen, insulin, insulin lispro, lancets, magnesium oxide, methocarbamol, multivitamin with minerals, oxybutynin, pen needle, diabetic, pravastatin, riboflavin (vitamin b2), rosuvastatin, scopolamine, sodium bicarbonate, sumatriptan, synthroid, topiramate, trazodone, and venlafaxine.    Allergies:  Review of patient's allergies indicates:  No Known Allergies       Hand dominance: R handed  Prior Therapy: Yes for her back and knees  Nutrition:  Obese  Social History: pt lives with her sister and brother- "we take care of each other."  Place of Residence (Steps/Adaptations): private home, 1 stair to enter. Pt arrives in a motorized scooter, mostly uses a quad cane for ambulation, RW, pt has a walk-in shower with seat, elevated toilet, lift recliner. Pt has a suprapubic catheter.  Current functional status:  Pt is independent, but her sister has to help allison put on her lifestyle meter and sometimes with upper body dressing. Pt states she mostly walks with her quad cane at home  Exercise routine prior to onset : "getting up and down"  DME owned: see place of residence  Work:  Retired                          Subjective   Pt " "states: pt states she always has back pain  Pain: 7/10 on VAS. In her back    Objective   Mental status :A+O x 3, pleasant, appropriate    Posture/Alignment   Postural examination/scapula alignment: rounded shoulders, forward head  Joint integrity: WFLs  Skin integrity: intact  Edema: none noted    Sensation: Light Touch: Intact           Proprioception: Intact  - appearance: well groomed     ROM:   UPPER EXTREMITY--AROM/PROM  (R) UE: WNLs  (L) UE: WNLs     Shoulder Range of Motion:  (Seated)  ACTIVE ROM LEFT RIGHT   Flexion 150 145   Abduction 170 170   Extension 65 70   IR/90deg 80 90   ER/90deg ~85 75     Strength: manual muscle test grades below   Upper Extremity Strength   (L) UE (R) UE   Shoulder flexion: 5/5 5/5   Shoulder Abduction: 5/5 5/5   Shoulder IR 5/5 5/5   Shoulder ER 5/5 5/5   Elbow flexion: 5/5 5/5   Elbow extension: 5/5 5/5   Wrist flexion: 5/5 5/5   Wrist extension: 5/5 5/5    good good       Baseline Measurements of BL UE's for early detection of Lymphedema:     LANDMARK RIGHT UE LEFT UE DIFFERENCE   E + 8" 53 cm 57.5 cm 4.5 cm   E + 6" 49.5 cm 48 cm 1.5 cm   E + 4" 45 cm 44.5 cm 0.5 cm   E + 2" 38.5 cm 41.5 cm 3 cm   Elbow 30.5 cm 33.5 cm 3 cm   W+ 8" 31 cm 30 cm 1 cm   W +  6" 27.5 cm 26.5 cm 1 cm   W + 4" 23 cm 23 cm 0 cm   Wrist 18 cm 18.5 cm 0.5 cm   DPC 21.5 cm 21 cm 0.5 cm   IP Thumb 6.5 cm 6.5 cm 0 cm         Coordination:   - fine motor: WFL  - UE coordination: intact     - LE coordination:  Not tested     Functional Mobility (Bed mobility, transfers)  Bed mobility: I =  independent   Roll to left: I  Roll to right: I  Supine to prone: I  Scooting to edge of bed: I  Supine to sit: I  Sit to supine: I  Transfers to bed: I  Transfers to toilet: I  Sit to stand:  I  Stand pivot:  I  Car transfers: I      ADL's:  Feeding: I = independent   Grooming: I  Hygiene: I  UB Dressing: I  LB Dressing: I  Toileting: I  Bathing: I    Gait Assessment:   - AD used: none  - Assistance: independent  - " Distance: community distances       Endurance Deficit: none      Patient Education   - role of PT in multi - disciplinary team, goals for PT  - Pt was educated in lymphedema etiology and management plans.    - Pt was provided with written risk reductions and precautions for managing lymphedema.   - Reviewed TEO drain care instructions.     -PT encouraged pt to follow-up with surgeon regarding concerns about a botox procedure prior to her scheduled breast surgery, as well as the potential lymphedema risk of her lifestyle meter (that gets changed in L upper arm every 14 days).    ROM/lifting Precautions post surgery discussed -  until drains have been removed:  - do not lift affected arm above 90 degrees of shoulder flexion  - do not lift over 5 lbs  - do not pull or push heavy objects  - do not sleep on your stomach or surgery side     Written Home Exercises Provided and Patient Education: Handouts given   Pt was instructed in and performed therapeutic exercise for postural correction and alignment, stretching and soft tissue mobility, and strengthening.     Exercises included: handout given    - exaggerated deep breathing and relaxation  - scapular retractions  - wrist circles  - elbow flexion/extension  -shoulder rolls    Pt was able to demonstrate and report understanding and performance    Pt has no cultural, educational or language barriers to learning provided.    Functional Limitations Reporting     Category: mobility  Tool: MMT  Score: 0% Limitation   Current/ : CH = 0 % impaired, limited or restricted  Goal/ : CH = 0 % impaired, limited or restricted  Discharge: CH 0% impaired, limited or restricted     Modifier  Impairment Limitation Restriction    CH  0 % impaired, limited or restricted    CI  @ least 1% but less than 20% impaired, limited or restricted    CJ  @ least 20%<40% impaired, limited or restricted    CK  @ least 40%<60% impaired, limited or restricted    CL  @ least 60% <80% impaired,  limited or restricted    CM  @ least 80%<100% impaired limited or restricted    CN  100% impaired, limited or restricted     Assessment   This is a 69 y.o. female referred to outpatient physical therapy and presents with a medical diagnosis of left breast cancer and was seen today pre-operatively to assess strength and ROM of BL UEs, to take baseline circumferential measurements of BL UEs to aid in the early detection of lymphedema and provide pt education on exercises/precations post breast surgery. Pt does not exhibit any ROM impairments  Pt educated in lymphedema risks/precautions as well as ROM/lifting precautions post surgery - pt demonstrated/verbalized understanding. No goals established this visit as goals for PT will be established post surgery at follow up.      Anticipated barriers to physical therapy: obesity, multiple co-morbidities     Pt's spiritual, cultural and educational needs considered and pt agreeable to plan of care and goals as stated below:     Medical necessity is demonstrated by the following IMPAIRMENTS/PROMBLEM LIST:  History  Co-morbidities and personal factors that may impact the plan of care Co-morbidities:   diabetes, high BMI and history of cancer    Personal Factors:   no deficits     moderate   Examination  Body Structures and Functions, activity limitations and participation restrictions that may impact the plan of care Body Regions:   N/A    Body Systems:    N/A    Participation Restrictions:   None anticipated    Activity limitations:   Learning and applying knowledge  no deficits    General Tasks and Commands  no deficits    Communication  no deficits    Mobility  lifting and carrying objects  walking    Self care  dressing    Domestic Life  cooking  doing house work (cleaning house, washing dishes, laundry)    Interactions/Relationships  no deficits    Life Areas  no deficits    Community and Social Life  community life         high   Clinical Presentation stable and  uncomplicated low   Decision Making/ Complexity Score: low           Plan   Schedule patient for follow up with Physical therapy post surgery. Goals for therapy post surgery will be established at that time.     Therapist: Adia Kumar, PT  7/24/2019  '

## 2019-07-25 ENCOUNTER — TELEPHONE (OUTPATIENT)
Dept: PHARMACY | Facility: CLINIC | Age: 69
End: 2019-07-25

## 2019-07-29 ENCOUNTER — PATIENT MESSAGE (OUTPATIENT)
Dept: SURGERY | Facility: HOSPITAL | Age: 69
End: 2019-07-29

## 2019-07-29 ENCOUNTER — PATIENT MESSAGE (OUTPATIENT)
Dept: PHYSICAL MEDICINE AND REHAB | Facility: CLINIC | Age: 69
End: 2019-07-29

## 2019-07-30 ENCOUNTER — PATIENT MESSAGE (OUTPATIENT)
Dept: PHYSICAL MEDICINE AND REHAB | Facility: CLINIC | Age: 69
End: 2019-07-30

## 2019-07-30 ENCOUNTER — ANESTHESIA EVENT (OUTPATIENT)
Dept: SURGERY | Facility: HOSPITAL | Age: 69
End: 2019-07-30
Payer: MEDICARE

## 2019-07-30 DIAGNOSIS — G89.29 CHRONIC MIDLINE LOW BACK PAIN WITHOUT SCIATICA: ICD-10-CM

## 2019-07-30 DIAGNOSIS — G89.29 CHRONIC NECK PAIN: ICD-10-CM

## 2019-07-30 DIAGNOSIS — M54.2 CHRONIC NECK PAIN: ICD-10-CM

## 2019-07-30 DIAGNOSIS — M79.7 FIBROMYALGIA: ICD-10-CM

## 2019-07-30 DIAGNOSIS — M54.50 CHRONIC MIDLINE LOW BACK PAIN WITHOUT SCIATICA: ICD-10-CM

## 2019-07-31 RX ORDER — HYDROCODONE BITARTRATE AND ACETAMINOPHEN 10; 325 MG/1; MG/1
1 TABLET ORAL EVERY 6 HOURS PRN
Qty: 120 TABLET | Refills: 0 | Status: ON HOLD | OUTPATIENT
Start: 2019-07-31 | End: 2019-08-02 | Stop reason: SDUPTHER

## 2019-08-01 ENCOUNTER — ANESTHESIA (OUTPATIENT)
Dept: SURGERY | Facility: HOSPITAL | Age: 69
End: 2019-08-01
Payer: MEDICARE

## 2019-08-01 ENCOUNTER — HOSPITAL ENCOUNTER (OUTPATIENT)
Dept: RADIOLOGY | Facility: HOSPITAL | Age: 69
Discharge: HOME | End: 2019-08-01
Attending: SURGERY
Payer: MEDICARE

## 2019-08-01 ENCOUNTER — HOSPITAL ENCOUNTER (OUTPATIENT)
Facility: HOSPITAL | Age: 69
Discharge: HOME | End: 2019-08-02
Attending: SURGERY | Admitting: SURGERY
Payer: MEDICARE

## 2019-08-01 DIAGNOSIS — M54.2 CHRONIC NECK PAIN: ICD-10-CM

## 2019-08-01 DIAGNOSIS — Z17.0: ICD-10-CM

## 2019-08-01 DIAGNOSIS — G89.29 CHRONIC MIDLINE LOW BACK PAIN WITHOUT SCIATICA: ICD-10-CM

## 2019-08-01 DIAGNOSIS — Z17.0 MALIGNANT NEOPLASM OF UPPER-OUTER QUADRANT OF LEFT BREAST IN FEMALE, ESTROGEN RECEPTOR POSITIVE: ICD-10-CM

## 2019-08-01 DIAGNOSIS — C50.912: ICD-10-CM

## 2019-08-01 DIAGNOSIS — Z17.0 MALIGNANT NEOPLASM OF UPPER-OUTER QUADRANT OF LEFT BREAST IN FEMALE, ESTROGEN RECEPTOR POSITIVE: Primary | ICD-10-CM

## 2019-08-01 DIAGNOSIS — C50.412 MALIGNANT NEOPLASM OF UPPER-OUTER QUADRANT OF LEFT BREAST IN FEMALE, ESTROGEN RECEPTOR POSITIVE: ICD-10-CM

## 2019-08-01 DIAGNOSIS — G89.29 CHRONIC NECK PAIN: ICD-10-CM

## 2019-08-01 DIAGNOSIS — M54.50 CHRONIC MIDLINE LOW BACK PAIN WITHOUT SCIATICA: ICD-10-CM

## 2019-08-01 DIAGNOSIS — C50.412 MALIGNANT NEOPLASM OF UPPER-OUTER QUADRANT OF LEFT BREAST IN FEMALE, ESTROGEN RECEPTOR POSITIVE: Primary | ICD-10-CM

## 2019-08-01 DIAGNOSIS — R92.8 ABNORMAL MAMMOGRAM: ICD-10-CM

## 2019-08-01 DIAGNOSIS — M79.7 FIBROMYALGIA: ICD-10-CM

## 2019-08-01 LAB
POCT GLUCOSE: 166 MG/DL (ref 70–110)
POCT GLUCOSE: 201 MG/DL (ref 70–110)

## 2019-08-01 PROCEDURE — 88341 TISSUE SPECIMEN TO PATHOLOGY - SURGERY: ICD-10-PCS | Mod: 26,,, | Performed by: PATHOLOGY

## 2019-08-01 PROCEDURE — 37000008 HC ANESTHESIA 1ST 15 MINUTES: Performed by: SURGERY

## 2019-08-01 PROCEDURE — 19303 PR MASTECTOMY, SIMPLE, COMPLETE: ICD-10-PCS | Mod: LT,,, | Performed by: SURGERY

## 2019-08-01 PROCEDURE — 76942 ECHO GUIDE FOR BIOPSY: CPT | Performed by: SURGERY

## 2019-08-01 PROCEDURE — 88342 IMHCHEM/IMCYTCHM 1ST ANTB: CPT | Performed by: PATHOLOGY

## 2019-08-01 PROCEDURE — G0378 HOSPITAL OBSERVATION PER HR: HCPCS

## 2019-08-01 PROCEDURE — D9220A PRA ANESTHESIA: ICD-10-PCS | Mod: ANES,,, | Performed by: ANESTHESIOLOGY

## 2019-08-01 PROCEDURE — 38525 BIOPSY/REMOVAL LYMPH NODES: CPT | Mod: 51,LT,, | Performed by: SURGERY

## 2019-08-01 PROCEDURE — 88331 PATH CONSLTJ SURG 1 BLK 1SPC: CPT | Mod: 26,,, | Performed by: PATHOLOGY

## 2019-08-01 PROCEDURE — 37000009 HC ANESTHESIA EA ADD 15 MINS: Performed by: SURGERY

## 2019-08-01 PROCEDURE — 63600175 PHARM REV CODE 636 W HCPCS: Performed by: ANESTHESIOLOGY

## 2019-08-01 PROCEDURE — 63600175 PHARM REV CODE 636 W HCPCS: Performed by: STUDENT IN AN ORGANIZED HEALTH CARE EDUCATION/TRAINING PROGRAM

## 2019-08-01 PROCEDURE — 64461 PVB THORACIC SINGLE INJ SITE: CPT | Performed by: SURGERY

## 2019-08-01 PROCEDURE — 36000706: Performed by: SURGERY

## 2019-08-01 PROCEDURE — 88342 IMHCHEM/IMCYTCHM 1ST ANTB: CPT | Mod: 26,,, | Performed by: PATHOLOGY

## 2019-08-01 PROCEDURE — 88307 TISSUE SPECIMEN TO PATHOLOGY - SURGERY: ICD-10-PCS | Mod: 26,,, | Performed by: PATHOLOGY

## 2019-08-01 PROCEDURE — 71000039 HC RECOVERY, EACH ADD'L HOUR: Performed by: SURGERY

## 2019-08-01 PROCEDURE — D9220A PRA ANESTHESIA: Mod: CRNA,,, | Performed by: NURSE ANESTHETIST, CERTIFIED REGISTERED

## 2019-08-01 PROCEDURE — 63600175 PHARM REV CODE 636 W HCPCS: Performed by: NURSE ANESTHETIST, CERTIFIED REGISTERED

## 2019-08-01 PROCEDURE — 88341 IMHCHEM/IMCYTCHM EA ADD ANTB: CPT | Mod: 26,,, | Performed by: PATHOLOGY

## 2019-08-01 PROCEDURE — 63600175 PHARM REV CODE 636 W HCPCS

## 2019-08-01 PROCEDURE — 36000707: Performed by: SURGERY

## 2019-08-01 PROCEDURE — 27200750 HC INSULATED NEEDLE/ STIMUPLEX: Performed by: SURGERY

## 2019-08-01 PROCEDURE — 88331 TISSUE SPECIMEN TO PATHOLOGY - SURGERY: ICD-10-PCS | Mod: 26,,, | Performed by: PATHOLOGY

## 2019-08-01 PROCEDURE — 38792 PR IDENTIFY SENTINEL 2DE: ICD-10-PCS | Mod: 51,LT,, | Performed by: SURGERY

## 2019-08-01 PROCEDURE — 25000003 PHARM REV CODE 250: Performed by: NURSE ANESTHETIST, CERTIFIED REGISTERED

## 2019-08-01 PROCEDURE — 88342 TISSUE SPECIMEN TO PATHOLOGY - SURGERY: ICD-10-PCS | Mod: 26,,, | Performed by: PATHOLOGY

## 2019-08-01 PROCEDURE — C1729 CATH, DRAINAGE: HCPCS | Performed by: SURGERY

## 2019-08-01 PROCEDURE — D9220A PRA ANESTHESIA: ICD-10-PCS | Mod: CRNA,,, | Performed by: NURSE ANESTHETIST, CERTIFIED REGISTERED

## 2019-08-01 PROCEDURE — 82962 GLUCOSE BLOOD TEST: CPT | Performed by: SURGERY

## 2019-08-01 PROCEDURE — 88307 TISSUE EXAM BY PATHOLOGIST: CPT | Mod: 26,,, | Performed by: PATHOLOGY

## 2019-08-01 PROCEDURE — 88341 IMHCHEM/IMCYTCHM EA ADD ANTB: CPT | Mod: 59 | Performed by: PATHOLOGY

## 2019-08-01 PROCEDURE — 38525 PR BIOPSY/REM LYMPH NODES, AXILLARY: ICD-10-PCS | Mod: 51,LT,, | Performed by: SURGERY

## 2019-08-01 PROCEDURE — 71000033 HC RECOVERY, INTIAL HOUR: Performed by: SURGERY

## 2019-08-01 PROCEDURE — 64461 PVB THORACIC SINGLE INJ SITE: CPT | Mod: 59,LT,GC, | Performed by: ANESTHESIOLOGY

## 2019-08-01 PROCEDURE — 25000003 PHARM REV CODE 250

## 2019-08-01 PROCEDURE — D9220A PRA ANESTHESIA: Mod: ANES,,, | Performed by: ANESTHESIOLOGY

## 2019-08-01 PROCEDURE — 19303 MAST SIMPLE COMPLETE: CPT | Mod: LT,,, | Performed by: SURGERY

## 2019-08-01 PROCEDURE — 25000003 PHARM REV CODE 250: Performed by: STUDENT IN AN ORGANIZED HEALTH CARE EDUCATION/TRAINING PROGRAM

## 2019-08-01 PROCEDURE — 38792 RA TRACER ID OF SENTINL NODE: CPT | Mod: 51,LT,, | Performed by: SURGERY

## 2019-08-01 PROCEDURE — A9520 TC99 TILMANOCEPT DIAG 0.5MCI: HCPCS

## 2019-08-01 PROCEDURE — 64461: ICD-10-PCS | Mod: 59,LT,GC, | Performed by: ANESTHESIOLOGY

## 2019-08-01 RX ORDER — ROCURONIUM BROMIDE 10 MG/ML
INJECTION, SOLUTION INTRAVENOUS
Status: DISCONTINUED | OUTPATIENT
Start: 2019-08-01 | End: 2019-08-01

## 2019-08-01 RX ORDER — GLUCAGON 1 MG
1 KIT INJECTION
Status: DISCONTINUED | OUTPATIENT
Start: 2019-08-01 | End: 2019-08-02 | Stop reason: HOSPADM

## 2019-08-01 RX ORDER — SODIUM CHLORIDE 9 MG/ML
INJECTION, SOLUTION INTRAVENOUS CONTINUOUS
Status: DISCONTINUED | OUTPATIENT
Start: 2019-08-01 | End: 2019-08-01

## 2019-08-01 RX ORDER — CEFAZOLIN SODIUM 1 G/3ML
2 INJECTION, POWDER, FOR SOLUTION INTRAMUSCULAR; INTRAVENOUS
Status: COMPLETED | OUTPATIENT
Start: 2019-08-01 | End: 2019-08-01

## 2019-08-01 RX ORDER — HYDROMORPHONE HYDROCHLORIDE 1 MG/ML
0.5 INJECTION, SOLUTION INTRAMUSCULAR; INTRAVENOUS; SUBCUTANEOUS EVERY 6 HOURS PRN
Status: DISCONTINUED | OUTPATIENT
Start: 2019-08-01 | End: 2019-08-02 | Stop reason: HOSPADM

## 2019-08-01 RX ORDER — HYDROCODONE BITARTRATE AND ACETAMINOPHEN 10; 325 MG/1; MG/1
1 TABLET ORAL EVERY 4 HOURS PRN
Status: DISCONTINUED | OUTPATIENT
Start: 2019-08-02 | End: 2019-08-02 | Stop reason: HOSPADM

## 2019-08-01 RX ORDER — HYDROCODONE BITARTRATE AND ACETAMINOPHEN 5; 325 MG/1; MG/1
TABLET ORAL
Status: COMPLETED
Start: 2019-08-01 | End: 2019-08-01

## 2019-08-01 RX ORDER — INSULIN ASPART 100 [IU]/ML
0-5 INJECTION, SOLUTION INTRAVENOUS; SUBCUTANEOUS
Status: DISCONTINUED | OUTPATIENT
Start: 2019-08-01 | End: 2019-08-02 | Stop reason: HOSPADM

## 2019-08-01 RX ORDER — SODIUM CHLORIDE 9 MG/ML
INJECTION, SOLUTION INTRAVENOUS CONTINUOUS
Status: CANCELLED | OUTPATIENT
Start: 2019-08-01

## 2019-08-01 RX ORDER — ONDANSETRON 8 MG/1
8 TABLET, ORALLY DISINTEGRATING ORAL EVERY 8 HOURS PRN
Status: DISCONTINUED | OUTPATIENT
Start: 2019-08-01 | End: 2019-08-02 | Stop reason: HOSPADM

## 2019-08-01 RX ORDER — FENTANYL CITRATE 50 UG/ML
INJECTION, SOLUTION INTRAMUSCULAR; INTRAVENOUS
Status: DISCONTINUED | OUTPATIENT
Start: 2019-08-01 | End: 2019-08-01

## 2019-08-01 RX ORDER — LABETALOL HYDROCHLORIDE 5 MG/ML
INJECTION, SOLUTION INTRAVENOUS
Status: DISCONTINUED | OUTPATIENT
Start: 2019-08-01 | End: 2019-08-01

## 2019-08-01 RX ORDER — MIDAZOLAM HYDROCHLORIDE 1 MG/ML
0.5 INJECTION INTRAMUSCULAR; INTRAVENOUS
Status: DISCONTINUED | OUTPATIENT
Start: 2019-08-01 | End: 2019-08-01 | Stop reason: HOSPADM

## 2019-08-01 RX ORDER — LIDOCAINE HYDROCHLORIDE 10 MG/ML
1 INJECTION, SOLUTION EPIDURAL; INFILTRATION; INTRACAUDAL; PERINEURAL ONCE
Status: DISCONTINUED | OUTPATIENT
Start: 2019-08-01 | End: 2019-08-02 | Stop reason: HOSPADM

## 2019-08-01 RX ORDER — FENTANYL CITRATE 50 UG/ML
50 INJECTION, SOLUTION INTRAMUSCULAR; INTRAVENOUS ONCE
Status: COMPLETED | OUTPATIENT
Start: 2019-08-01 | End: 2019-08-01

## 2019-08-01 RX ORDER — SODIUM CHLORIDE 9 MG/ML
INJECTION, SOLUTION INTRAVENOUS CONTINUOUS PRN
Status: DISCONTINUED | OUTPATIENT
Start: 2019-08-01 | End: 2019-08-01

## 2019-08-01 RX ORDER — FENTANYL CITRATE 50 UG/ML
INJECTION, SOLUTION INTRAMUSCULAR; INTRAVENOUS
Status: COMPLETED
Start: 2019-08-01 | End: 2019-08-01

## 2019-08-01 RX ORDER — IBUPROFEN 200 MG
16 TABLET ORAL
Status: DISCONTINUED | OUTPATIENT
Start: 2019-08-01 | End: 2019-08-02 | Stop reason: HOSPADM

## 2019-08-01 RX ORDER — PROPOFOL 10 MG/ML
VIAL (ML) INTRAVENOUS
Status: DISCONTINUED | OUTPATIENT
Start: 2019-08-01 | End: 2019-08-01

## 2019-08-01 RX ORDER — SODIUM CHLORIDE 9 MG/ML
INJECTION, SOLUTION INTRAVENOUS CONTINUOUS
Status: DISCONTINUED | OUTPATIENT
Start: 2019-08-01 | End: 2019-08-02 | Stop reason: HOSPADM

## 2019-08-01 RX ORDER — LIDOCAINE HCL/PF 100 MG/5ML
SYRINGE (ML) INTRAVENOUS
Status: DISCONTINUED | OUTPATIENT
Start: 2019-08-01 | End: 2019-08-01

## 2019-08-01 RX ORDER — HYDROCODONE BITARTRATE AND ACETAMINOPHEN 5; 325 MG/1; MG/1
1 TABLET ORAL EVERY 4 HOURS PRN
Status: DISCONTINUED | OUTPATIENT
Start: 2019-08-01 | End: 2019-08-02 | Stop reason: HOSPADM

## 2019-08-01 RX ORDER — HYDRALAZINE HYDROCHLORIDE 20 MG/ML
10 INJECTION INTRAMUSCULAR; INTRAVENOUS EVERY 6 HOURS PRN
Status: DISCONTINUED | OUTPATIENT
Start: 2019-08-01 | End: 2019-08-02 | Stop reason: HOSPADM

## 2019-08-01 RX ORDER — PHENYLEPHRINE HYDROCHLORIDE 10 MG/ML
INJECTION INTRAVENOUS
Status: DISCONTINUED | OUTPATIENT
Start: 2019-08-01 | End: 2019-08-01

## 2019-08-01 RX ORDER — DIPHENHYDRAMINE HYDROCHLORIDE 50 MG/ML
25 INJECTION INTRAMUSCULAR; INTRAVENOUS EVERY 6 HOURS PRN
Status: DISCONTINUED | OUTPATIENT
Start: 2019-08-01 | End: 2019-08-01 | Stop reason: HOSPADM

## 2019-08-01 RX ORDER — ONDANSETRON 2 MG/ML
INJECTION INTRAMUSCULAR; INTRAVENOUS
Status: DISCONTINUED | OUTPATIENT
Start: 2019-08-01 | End: 2019-08-01

## 2019-08-01 RX ORDER — ACETAMINOPHEN 10 MG/ML
INJECTION, SOLUTION INTRAVENOUS
Status: DISCONTINUED | OUTPATIENT
Start: 2019-08-01 | End: 2019-08-01

## 2019-08-01 RX ORDER — GLYCOPYRROLATE 0.2 MG/ML
INJECTION INTRAMUSCULAR; INTRAVENOUS
Status: DISCONTINUED | OUTPATIENT
Start: 2019-08-01 | End: 2019-08-01

## 2019-08-01 RX ORDER — IBUPROFEN 200 MG
24 TABLET ORAL
Status: DISCONTINUED | OUTPATIENT
Start: 2019-08-01 | End: 2019-08-02 | Stop reason: HOSPADM

## 2019-08-01 RX ORDER — SUMATRIPTAN 50 MG/1
100 TABLET, FILM COATED ORAL ONCE
Status: COMPLETED | OUTPATIENT
Start: 2019-08-01 | End: 2019-08-02

## 2019-08-01 RX ORDER — LABETALOL HCL 20 MG/4 ML
10 SYRINGE (ML) INTRAVENOUS ONCE
Status: COMPLETED | OUTPATIENT
Start: 2019-08-01 | End: 2019-08-01

## 2019-08-01 RX ORDER — HYDROMORPHONE HYDROCHLORIDE 1 MG/ML
0.2 INJECTION, SOLUTION INTRAMUSCULAR; INTRAVENOUS; SUBCUTANEOUS EVERY 5 MIN PRN
Status: DISCONTINUED | OUTPATIENT
Start: 2019-08-01 | End: 2019-08-01 | Stop reason: HOSPADM

## 2019-08-01 RX ORDER — SUCCINYLCHOLINE CHLORIDE 20 MG/ML
INJECTION INTRAMUSCULAR; INTRAVENOUS
Status: DISCONTINUED | OUTPATIENT
Start: 2019-08-01 | End: 2019-08-01

## 2019-08-01 RX ORDER — FENTANYL CITRATE 50 UG/ML
25 INJECTION, SOLUTION INTRAMUSCULAR; INTRAVENOUS EVERY 5 MIN PRN
Status: DISCONTINUED | OUTPATIENT
Start: 2019-08-01 | End: 2019-08-01 | Stop reason: HOSPADM

## 2019-08-01 RX ORDER — LABETALOL HYDROCHLORIDE 5 MG/ML
20 INJECTION, SOLUTION INTRAVENOUS ONCE
Status: DISCONTINUED | OUTPATIENT
Start: 2019-08-01 | End: 2019-08-01

## 2019-08-01 RX ORDER — DEXAMETHASONE SODIUM PHOSPHATE 4 MG/ML
INJECTION, SOLUTION INTRA-ARTICULAR; INTRALESIONAL; INTRAMUSCULAR; INTRAVENOUS; SOFT TISSUE
Status: DISCONTINUED | OUTPATIENT
Start: 2019-08-01 | End: 2019-08-01

## 2019-08-01 RX ORDER — FENTANYL CITRATE 50 UG/ML
25 INJECTION, SOLUTION INTRAMUSCULAR; INTRAVENOUS EVERY 5 MIN PRN
Status: COMPLETED | OUTPATIENT
Start: 2019-08-01 | End: 2019-08-01

## 2019-08-01 RX ORDER — KETAMINE HCL IN 0.9 % NACL 50 MG/5 ML
SYRINGE (ML) INTRAVENOUS
Status: DISCONTINUED | OUTPATIENT
Start: 2019-08-01 | End: 2019-08-01

## 2019-08-01 RX ORDER — MIDAZOLAM HYDROCHLORIDE 1 MG/ML
INJECTION, SOLUTION INTRAMUSCULAR; INTRAVENOUS
Status: DISCONTINUED | OUTPATIENT
Start: 2019-08-01 | End: 2019-08-01

## 2019-08-01 RX ORDER — SODIUM CHLORIDE 0.9 % (FLUSH) 0.9 %
10 SYRINGE (ML) INJECTION
Status: DISCONTINUED | OUTPATIENT
Start: 2019-08-01 | End: 2019-08-01 | Stop reason: HOSPADM

## 2019-08-01 RX ADMIN — ONDANSETRON 4 MG: 2 INJECTION INTRAMUSCULAR; INTRAVENOUS at 06:08

## 2019-08-01 RX ADMIN — FENTANYL CITRATE 25 MCG: 50 INJECTION INTRAMUSCULAR; INTRAVENOUS at 08:08

## 2019-08-01 RX ADMIN — HYDROCODONE BITARTRATE AND ACETAMINOPHEN 1 TABLET: 5; 325 TABLET ORAL at 07:08

## 2019-08-01 RX ADMIN — FENTANYL CITRATE 25 MCG: 50 INJECTION INTRAMUSCULAR; INTRAVENOUS at 07:08

## 2019-08-01 RX ADMIN — LABETALOL HYDROCHLORIDE 10 MG: 5 INJECTION, SOLUTION INTRAVENOUS at 06:08

## 2019-08-01 RX ADMIN — FENTANYL CITRATE 50 MCG: 50 INJECTION, SOLUTION INTRAMUSCULAR; INTRAVENOUS at 04:08

## 2019-08-01 RX ADMIN — ACETAMINOPHEN 1000 MG: 10 INJECTION, SOLUTION INTRAVENOUS at 04:08

## 2019-08-01 RX ADMIN — FENTANYL CITRATE 50 MCG: 50 INJECTION INTRAMUSCULAR; INTRAVENOUS at 11:08

## 2019-08-01 RX ADMIN — SODIUM CHLORIDE: 0.9 INJECTION, SOLUTION INTRAVENOUS at 10:08

## 2019-08-01 RX ADMIN — Medication 10 MG: at 05:08

## 2019-08-01 RX ADMIN — GLYCOPYRROLATE 0.2 MCG: 0.2 INJECTION, SOLUTION INTRAMUSCULAR; INTRAVENOUS at 04:08

## 2019-08-01 RX ADMIN — SUCCINYLCHOLINE CHLORIDE 160 MG: 20 INJECTION, SOLUTION INTRAMUSCULAR; INTRAVENOUS at 04:08

## 2019-08-01 RX ADMIN — PHENYLEPHRINE HYDROCHLORIDE 100 MCG: 10 INJECTION INTRAVENOUS at 05:08

## 2019-08-01 RX ADMIN — CEFAZOLIN 2 G: 330 INJECTION, POWDER, FOR SOLUTION INTRAMUSCULAR; INTRAVENOUS at 04:08

## 2019-08-01 RX ADMIN — PROPOFOL 200 MG: 10 INJECTION, EMULSION INTRAVENOUS at 04:08

## 2019-08-01 RX ADMIN — DEXAMETHASONE SODIUM PHOSPHATE 8 MG: 4 INJECTION, SOLUTION INTRAMUSCULAR; INTRAVENOUS at 04:08

## 2019-08-01 RX ADMIN — MIDAZOLAM HYDROCHLORIDE 2 MG: 1 INJECTION, SOLUTION INTRAMUSCULAR; INTRAVENOUS at 04:08

## 2019-08-01 RX ADMIN — LABETALOL HYDROCHLORIDE 5 MG: 5 INJECTION, SOLUTION INTRAVENOUS at 05:08

## 2019-08-01 RX ADMIN — SODIUM CHLORIDE: 0.9 INJECTION, SOLUTION INTRAVENOUS at 04:08

## 2019-08-01 RX ADMIN — LIDOCAINE HYDROCHLORIDE 100 MG: 20 INJECTION, SOLUTION INTRAVENOUS at 04:08

## 2019-08-01 RX ADMIN — HYDROMORPHONE HYDROCHLORIDE 0.2 MG: 1 INJECTION, SOLUTION INTRAMUSCULAR; INTRAVENOUS; SUBCUTANEOUS at 10:08

## 2019-08-01 RX ADMIN — INSULIN ASPART 1 UNITS: 100 INJECTION, SOLUTION INTRAVENOUS; SUBCUTANEOUS at 10:08

## 2019-08-01 RX ADMIN — HYDRALAZINE HYDROCHLORIDE 10 MG: 20 INJECTION INTRAMUSCULAR; INTRAVENOUS at 10:08

## 2019-08-01 RX ADMIN — PROPOFOL 50 MG: 10 INJECTION, EMULSION INTRAVENOUS at 04:08

## 2019-08-01 RX ADMIN — MIDAZOLAM HYDROCHLORIDE 2 MG: 1 INJECTION, SOLUTION INTRAMUSCULAR; INTRAVENOUS at 11:08

## 2019-08-01 RX ADMIN — FENTANYL CITRATE: 50 INJECTION INTRAMUSCULAR; INTRAVENOUS at 02:08

## 2019-08-01 RX ADMIN — ROCURONIUM BROMIDE 10 MG: 10 INJECTION, SOLUTION INTRAVENOUS at 04:08

## 2019-08-01 RX ADMIN — SODIUM CHLORIDE: 0.9 INJECTION, SOLUTION INTRAVENOUS at 02:08

## 2019-08-01 RX ADMIN — Medication 30 MG: at 04:08

## 2019-08-01 RX ADMIN — FENTANYL CITRATE 50 MCG: 50 INJECTION, SOLUTION INTRAMUSCULAR; INTRAVENOUS at 05:08

## 2019-08-01 RX ADMIN — FENTANYL CITRATE 100 MCG: 50 INJECTION, SOLUTION INTRAMUSCULAR; INTRAVENOUS at 04:08

## 2019-08-01 RX ADMIN — LABETALOL HYDROCHLORIDE 10 MG: 5 INJECTION INTRAVENOUS at 07:08

## 2019-08-01 RX ADMIN — PHENYLEPHRINE HYDROCHLORIDE 100 MCG: 10 INJECTION INTRAVENOUS at 04:08

## 2019-08-01 NOTE — PRE-PROCEDURE INSTRUCTIONS
"  Spoke with Patient.  NPO, medication, and pre-op instructions reviewed.  Instructed that she can have clear liquids between 0000 - 0900 and then to remain NPO after 0900.  Denies previous problems with Anesthesia.    Has chronic pain and chronic daily migraines.  Stated that she has been "keeping a close tab" on her glucose with morning readings of 110-130.  Occasionally will have symptoms of Hypoglycemia.  Wears a continuous glucose monitor which she will remove before she arrives for surgery.  Verbalized understanding of instructions.    "

## 2019-08-01 NOTE — INTERVAL H&P NOTE
The patient has been examined and the H&P has been reviewed:    I concur with the findings and no changes have occurred since H&P was written.    Anesthesia/Surgery risks, benefits and alternative options discussed and understood by patient/family.          Active Hospital Problems    Diagnosis  POA    Malignant neoplasm of left breast, estrogen receptor positive [C50.912, Z17.0]  Not Applicable      Resolved Hospital Problems   No resolved problems to display.

## 2019-08-01 NOTE — ANESTHESIA PROCEDURE NOTES
Erector Spinae SS    Patient location during procedure: pre-op   Block not for primary anesthetic.  Reason for block: at surgeon's request and post-op pain management   Post-op Pain Location: Left breast  Start time: 8/1/2019 11:56 AM  Timeout: 8/1/2019 11:55 AM   End time: 8/1/2019 12:05 PM    Staffing  Authorizing Provider: Navi Pollock MD  Performing Provider: Crow Hernandez MD    Preanesthetic Checklist  Completed: patient identified, site marked, surgical consent, pre-op evaluation, timeout performed, IV checked, risks and benefits discussed and monitors and equipment checked  Peripheral Block  Patient position: sitting  Prep: ChloraPrep  Patient monitoring: heart rate, cardiac monitor, continuous pulse ox, continuous capnometry and frequent blood pressure checks  Block type: erector spinae plane (Erector Spinae Plane)  Laterality: left  Injection technique: single shot  Location: T2-3  Needle  Needle type: Stimuplex   Needle gauge: 21 G  Needle length: 4 in  Needle localization: anatomical landmarks and ultrasound guidance  Catheter type: spring wound  Catheter size: 19 G   -ultrasound image captured on disc.  Assessment  Injection assessment: negative parasthesia, local visualized surrounding nerve and negative aspiration  Paresthesia pain: none  Heart rate change: no  Slow fractionated injection: yes  Additional Notes  Patient tolerated very well.  Vital signs stable.  [facility].  30 cc of 0.5% bupivacaine with Epi 1:300,000. 50 mcg of clonidine and 1mg of decadron added

## 2019-08-01 NOTE — ANESTHESIA PREPROCEDURE EVALUATION
[facility]  Anesthesia Pre-Operative Evaluation         Patient Name: Cecile Bowen  YOB: 1950  MRN: [id number]    SUBJECTIVE:     Pre-operative evaluation for Procedure(s) (LRB):  MASTECTOMY 23 hour stay (Left)  INJECTION, FOR SENTINEL NODE IDENTIFICATION (Left)  BIOPSY, LYMPH NODE, SENTINEL (Left)  LYMPHADENECTOMY, AXILLARY (Left)     08/01/2019    Cecile Bowen is a 69 y.o. female w/ a significant PMHx of CKD with GFR in the mid 25.5 on 5/30. She has insulin dependent diabetes. Her last A1c was 6.7 on 5/30. She recently had an EKG on 6/14 which revealed sinus rhythm with LVH and TTE on 6/26 with EF of 55%.     Of note, she was found to have IDC and DCIS of the Left breast. The patient initially underwent a screening mammogram on 6/19/2019 which was abnormal.      Patient now presents for the above procedure(s).      LDA: None documented.       Peripheral IV - Single Lumen 03/25/19 0623 Right Forearm (Active)   Number of days: 128            Suprapubic Catheter (Active)   Number of days:             Suprapubic Catheter 03/25/19 0724 latex 16 Fr. (Active)   Number of days: 128       Prev airway: None documented.    Drips: None documented.      Patient Active Problem List   Diagnosis    Hypertension    Hypothyroidism    Fibromyalgia    Chronic rhinitis    Intractable chronic migraine without aura    Vitamin D deficiency disease    Hyperlipidemia    VIJAYA (obstructive sleep apnea)    Nuclear sclerosis - Both Eyes    Dizziness and giddiness    Falls frequently    Abnormality of gait and mobility    Mobility impaired    Osteoarthritis of lumbar spine    Recurrent UTI    Enlarged lymph node    Anemia    Iron deficiency anemia    Primary osteoarthritis involving multiple joints    Encounter for care or replacement of suprapubic tube    Urinary retention    Metabolic acidosis    Neurogenic bladder    Major depressive disorder, recurrent episode, moderate     "Insomnia    Mixed migraine and muscle contraction headache    Secondary hyperparathyroidism, renal    Uncontrolled type 2 diabetes mellitus with chronic kidney disease, with long-term current use of insulin    Uncontrolled type 2 diabetes mellitus with diabetic neuropathy, without long-term current use of insulin    Chronic suprapubic catheter    Pulmonary nodule    Osteopenia    Morbid obesity due to excess calories    Special screening for malignant neoplasms, colon    Medication overuse headache    Chronic daily headache    Long term prescription opiate use    Lumbar spondylosis    Chronic pain    Hydronephrosis    CKD (chronic kidney disease) stage 4, GFR 15-29 ml/min    Cervicogenic migraine    OWLFE (dyspnea on exertion)    Malignant neoplasm of upper-outer quadrant of left breast in female, estrogen receptor positive    At risk for lymphedema       Review of patient's allergies indicates:  No Known Allergies    Current Inpatient Medications:      No current facility-administered medications on file prior to encounter.      Current Outpatient Medications on File Prior to Encounter   Medication Sig Dispense Refill    BD INSULIN SYRINGE ULTRA-FINE 0.5 mL 31 gauge x 5/16" Syrg USE WITH INSULIN 4 TIMES A  each 12    blood sugar diagnostic Strp ONE TOUCH ULTRA TEST STRIPS//Check blood sugars  strip 6    blood-glucose meter kit ONE TOUCH ULTRA 1 each 0    butalbital-acetaminophen-caffeine -40 mg (FIORICET, ESGIC) -40 mg per tablet TAKE 1 TABLET BY MOUTH WHEN NOT CONTROLLED BY OTHER MEDICATIONS. NO MORE THAN 10 TABS PER 30 DAYS 10 tablet 0    cloNIDine (CATAPRES) 0.1 MG tablet Take 1 tablet (0.1 mg total) by mouth once. for 1 dose (Patient taking differently: Take 0.1 mg by mouth daily as needed. ) 30 tablet 3    cyanocobalamin, vitamin B-12, (VITAMIN B-12) 5,000 mcg Subl Place 1 tablet under the tongue every morning.       ergocalciferol (VITAMIN D2) 50,000 unit Cap " "TAKE ONE CAPSULE BY MOUTH ONE TIME PER WEEK (Patient taking differently: Take 50,000 Units by mouth every 7 days. TAKE ONE CAPSULE BY MOUTH ONE TIME PER WEEK, Takes on Tuesdays) 12 capsule 3    ferrous sulfate 325 (65 FE) MG EC tablet Take 325 mg by mouth nightly.       gemfibrozil (LOPID) 600 MG tablet TAKE 1 TABLET BY MOUTH EVERY OTHER DAY (Patient taking differently: Take 600 mg by mouth. TAKE 1 TABLET BY MOUTH EVERY OTHER DAY at bedtime) 60 tablet 2    insulin (LANTUS SOLOSTAR U-100 INSULIN) glargine 100 units/mL (3mL) SubQ pen Inject 24 units daily. (Patient taking differently: Inject 24 Units into the skin every morning. Inject 24 units daily.) 15 mL 6    insulin lispro (HUMALOG U-100 INSULIN) 100 unit/mL injection Inject 5-7 units w/ meals plus scale 180-230+2, 231-280+4, 281-330+6, 331-380+8, >380 +10. (Patient taking differently: 3 (three) times daily before meals. Injects 7- 5- 7 units w/ meals plus scale 180-230+2, 231-280+4, 281-330+6, 331-380+8, >380 +10.) 20 mL 6    lancets Misc Ultra 2 lancets to check blood sugar  each 6    magnesium oxide (MAG-OX) 400 mg (241.3 mg magnesium) tablet TAKE 1 TABLET (400 MG TOTAL) BY MOUTH 2 (TWO) TIMES DAILY. 180 tablet 1    methocarbamol (ROBAXIN) 750 MG Tab Take 1-2 tablets (750-1,500 mg total) by mouth 3 (three) times daily as needed. 180 tablet 3    multivitamin with minerals tablet Take 1 tablet by mouth every morning.       oxybutynin (DITROPAN-XL) 10 MG 24 hr tablet TAKE 1 TABLET (10 MG TOTAL) BY MOUTH ONCE DAILY. (Patient taking differently: Take 10 mg by mouth nightly. ) 30 tablet 9    pen needle, diabetic (BD ULTRA-FINE SHORT PEN NEEDLE) 31 gauge x 5/16" Ndle Uses w/ lantus daily. 90 day 100 each 3    riboflavin, vitamin B2, 400 mg Tab Take 400 mg by mouth once daily. (Patient taking differently: Take 400 mg by mouth every morning. ) 90 tablet 3    rosuvastatin (CRESTOR) 40 MG Tab Take 40 mg by mouth nightly.       scopolamine " (TRANSDERM-SCOP) 1.3-1.5 mg (1 mg over 3 days) Place 1 patch onto the skin every 72 hours. (Patient taking differently: Place 1 patch onto the skin every 72 hours as needed. ) 4 patch 0    sodium bicarbonate 650 MG tablet TAKE 1 TABLET (650 MG TOTAL) BY MOUTH 3 (THREE) TIMES DAILY. (Patient taking differently: TAKE 1 TABLET (650 MG TOTAL) BY MOUTH 1 IN THE MORNING AND 2 TABLETS IN THE EVENING) 270 tablet 2    sumatriptan (IMITREX) 100 MG tablet TAKE 1 TABLET (100 MG TOTAL) BY MOUTH 2 (TWO) TIMES DAILY AS NEEDED FOR MIGRAINE. 9 tablet 6    SYNTHROID 50 mcg tablet TAKE 1 TABLET (50 MCG TOTAL) BY MOUTH ONCE DAILY. (Patient taking differently: TAKE 1 TABLET (50 MCG TOTAL) BY MOUTH ONCE DAILY, morning) 30 tablet 6    topiramate (TOPAMAX) 200 MG Tab TAKE 1 TABLET BY MOUTH TWICE A  tablet 2    traZODone (DESYREL) 100 MG tablet TAKE 1 TABLET BY MOUTH AT BEDTIME MAY TAKE AN EXTRA HALF TABLET IF NEEDED (Patient taking differently: Take 150 mg by mouth nightly. TAKE 1 TABLET BY MOUTH AT BEDTIME MAY TAKE AN EXTRA HALF TABLET IF NEEDED) 45 tablet 12    venlafaxine (EFFEXOR-XR) 75 MG 24 hr capsule Take 2 capsules (150 mg total) by mouth once daily. (Patient taking differently: Take 150 mg by mouth nightly. ) 180 capsule 1       Past Surgical History:   Procedure Laterality Date    BLOCK-NERVE GENITOFEMORAL Left 2/20/2018    Performed by [facility] at [facility] PAIN MGT    BLOCK-NERVE-MEDIAL BRANCH-LUMBAR Bilateral 3/15/2018    Performed by [facility] at [facility] PAIN MGT    BLOCK-NERVE-MEDIAL BRANCH-LUMBAR Bilateral 2/20/2018    [facility]formed by [facility] at [facility] PAIN T    BREAST BIOPSY Right     benign    CHOLECYSTECTOMY      COLONOSCOPY N/A 1/13/2017    Performed by Morris [facility]eman MD at [facility] ENDO (4TH FLR)    CYSTOSCOPY N/A 3/25/2019    Performed by [facility] Whittington [facility] at [facility] OR 1ST FLR    CYSTOSCOPY N/A 10/8/2018    Per[facility] by [facility] at [facility] OR 1ST FLR     CYSTOSCOPY N/A 5/14/2018    [facility]ST FLR    CYSTOSCOPY N/A 12/14/2015    [facility] OR 1ST FLR    CYSTOSCOPY N/A 5/29/2015    [facility] OR 2ND FLR    CYSTOSCOPY N/A 3/23/2015    [facility] OR 1ST FLR    DILATION AND CURETTAGE OF UTERUS      EXCHANGE suprapubic catheter N/A 3/25/2019    [facility] OR 1ST FLR    FLUORO URODYNAMIC STUDY (FUDS) N/A 3/23/2015    [facility] OR 1ST FLR    GALLBLADDER SURGERY  2006    HYSTERECTOMY      INJECTION, BOTULINUM TOXIN, TYPE A 300 UNITS N/A 3/25/2019    [facility] OR 1ST FLR    INJECTION, BOTULINUM TOXIN, TYPE A 300 UNITS N/A 10/8/2018    [facility] OR 1ST FLR    INJECTION-BOTOX N/A 5/14/2018    [facility] OR 1ST FLR    INJECTION-BOTOX N/A 12/14/2015    [facility] 1ST FLR    INJECTION-BOTOX N/A 5/29/2015    [facility] at [facility] OR 2ND FLR    INSERTION-CATHETER-SUPRAPUBIC CHANGE N/A 5/14/2018    [facility] OR 1ST FLR    INSERTION-SUPRAPUBIC TUBE-OPEN N/A 5/29/2015    [facility] at [facility] OR 2ND FLR    OVARIAN CYST SURGERY  1985    RADIOFREQUENCY THERMOCOAGULATION (RFTC)-NERVE-MEDIAN BRANCH-LUMBAR Left 4/20/2018    [facility]    RADIOFREQUENCY THERMOCOAGULATION (RFTC)-NERVE-MEDIAN BRANCH-LUMBAR Right 4/2/2018    [facility]formed by [facility] at [facility] PAIN MGT    spt placement      TONSILLECTOMY, ADENOIDECTOMY      TOTAL ABDOMINAL HYSTERECTOMY W/ BILATERAL SALPINGOOPHORECTOMY  1985       Social History     Socioeconomic History    Marital status:      Spouse  name: Not on file    Number of children: Not on file    Years of education: Not on file    Highest education level: Not on file   Occupational History    Occupation: teacher     Comment: retired   Social Needs    Financial resource strain: Not on file    Food insecurity:     Worry: Not on file     Inability: Not on file    Transportation needs:     Medical: Not on file     Non-medical: Not on file   Tobacco Use    Smoking status: Never Smoker    Smokeless tobacco: Never Used   Substance and Sexual Activity    Alcohol use: No     Alcohol/week: 0.0 oz    Drug use: No    Sexual activity: Not Currently     Birth control/protection: Abstinence   Lifestyle    Physical activity:     Days per week: Not on file     Minutes per session: Not on file    Stress: Not on file   Relationships    Social connections:     Talks on phone: Not on file     Gets together: Not on file     Attends Denominational service: Not on file     Active member of club or organization: Not on file     Attends meetings of clubs or organizations: Not on file     Relationship status: Not on file   Other Topics Concern    Not on file   Social History Narrative    Single ()        Lives w/ sister and brother. Pt needs to keep her narcotics hidden from her brother who will steal them            OBJECTIVE:     Vital Signs Range (Last 24H):         Significant Labs:  Lab Results   Component Value Date    WBC 10.42 01/22/2019    HGB 13.6 01/22/2019    HCT 43.0 01/22/2019     (H) 01/22/2019    CHOL 169 06/26/2019    TRIG 166 (H) 06/26/2019    HDL 43 06/26/2019    ALT 10 09/21/2017    AST 11 09/21/2017     (L) 05/30/2019    K 4.5 05/30/2019     05/30/2019    CREATININE 2.0 (H) 05/30/2019    BUN 27 (H) 05/30/2019    CO2 21 (L) 05/30/2019    TSH 3.357 05/30/2019    INR 1.1 09/21/2014    HGBA1C 6.7 (H) 05/30/2019       Diagnostic Studies: No relevant studies.    EKG: No recent studies available.    2D ECHO:  No results found.  However, due to the size of the patient record, not all encounters were searched. Please check Results Review for a complete set of results.      ASSESSMENT/PLAN:         Anesthesia Evaluation         Review of Systems      Physical Exam  General:  Well nourished    Airway/Jaw/Neck:  Airway Findings: Mouth Opening: Normal Tongue: Normal  General Airway Assessment: Adult  Mallampati: II  Improves to II with phonation.  TM Distance: Normal, at least 6 cm      Dental:  Dental Findings: Edentulous   Chest/Lungs:  Chest/Lungs Findings: Clear to auscultation     Heart/Vascular:  Heart Findings: Rate: Normal  Rhythm: Regular Rhythm  Sounds: Normal        Mental Status:  Mental Status Findings:  Cooperative, Alert and Oriented         Anesthesia Plan  Type of Anesthesia, risks & benefits discussed:  Anesthesia Type:  general  Patient's Preference: General  Intra-op Monitoring Plan: standard ASA monitors  Intra-op Monitoring Plan Comments: Standard ASA monitors.   Post Op Pain Control Plan: per primary service following discharge from PACU  Post Op Pain Control Plan Comments: Per primary service.     Induction:   IV  Beta Blocker:  Patient is not currently on a Beta-Blocker (No further documentation required).       Informed Consent: Patient understands risks and agrees with Anesthesia plan.  Questions answered. Anesthesia consent signed with patient.  ASA Score: 3     Day of Surgery Review of History & Physical:    H&P update referred to the surgeon.     Anesthesia Plan Notes: Chart reviewed, patient interviewed and examined.  The plan for general anesthesia was explained.  Questions were answered and the consent was signed.  Franchesca WINSTON         Ready For Surgery From Anesthesia Perspective.

## 2019-08-02 VITALS
HEART RATE: 84 BPM | OXYGEN SATURATION: 98 % | HEIGHT: 68 IN | WEIGHT: 290 LBS | BODY MASS INDEX: 43.95 KG/M2 | RESPIRATION RATE: 18 BRPM | SYSTOLIC BLOOD PRESSURE: 137 MMHG | TEMPERATURE: 98 F | DIASTOLIC BLOOD PRESSURE: 66 MMHG

## 2019-08-02 LAB
POCT GLUCOSE: 115 MG/DL (ref 70–110)
POCT GLUCOSE: 216 MG/DL (ref 70–110)
POCT GLUCOSE: 220 MG/DL (ref 70–110)

## 2019-08-02 PROCEDURE — 94761 N-INVAS EAR/PLS OXIMETRY MLT: CPT

## 2019-08-02 PROCEDURE — 63600175 PHARM REV CODE 636 W HCPCS: Performed by: STUDENT IN AN ORGANIZED HEALTH CARE EDUCATION/TRAINING PROGRAM

## 2019-08-02 PROCEDURE — G0378 HOSPITAL OBSERVATION PER HR: HCPCS

## 2019-08-02 PROCEDURE — 25000003 PHARM REV CODE 250: Performed by: STUDENT IN AN ORGANIZED HEALTH CARE EDUCATION/TRAINING PROGRAM

## 2019-08-02 PROCEDURE — 25000003 PHARM REV CODE 250: Performed by: ANESTHESIOLOGY

## 2019-08-02 RX ORDER — HYDROCODONE BITARTRATE AND ACETAMINOPHEN 10; 325 MG/1; MG/1
1 TABLET ORAL EVERY 6 HOURS PRN
Qty: 120 TABLET | Refills: 0 | Status: SHIPPED | OUTPATIENT
Start: 2019-08-02 | End: 2019-08-26 | Stop reason: SDUPTHER

## 2019-08-02 RX ORDER — SUMATRIPTAN 50 MG/1
100 TABLET, FILM COATED ORAL ONCE
Status: COMPLETED | OUTPATIENT
Start: 2019-08-02 | End: 2019-08-02

## 2019-08-02 RX ORDER — GUAIFENESIN 100 MG/5ML
200 SOLUTION ORAL EVERY 6 HOURS PRN
Status: DISCONTINUED | OUTPATIENT
Start: 2019-08-02 | End: 2019-08-02 | Stop reason: HOSPADM

## 2019-08-02 RX ORDER — OXYCODONE HYDROCHLORIDE 5 MG/1
5 TABLET ORAL EVERY 4 HOURS PRN
Qty: 20 TABLET | Refills: 0 | Status: ON HOLD | OUTPATIENT
Start: 2019-08-02 | End: 2019-08-26 | Stop reason: SDUPTHER

## 2019-08-02 RX ORDER — OXYCODONE HYDROCHLORIDE 10 MG/1
10 TABLET ORAL EVERY 6 HOURS PRN
Qty: 20 TABLET | Refills: 0 | Status: CANCELLED | OUTPATIENT
Start: 2019-08-02

## 2019-08-02 RX ADMIN — INSULIN ASPART 2 UNITS: 100 INJECTION, SOLUTION INTRAVENOUS; SUBCUTANEOUS at 12:08

## 2019-08-02 RX ADMIN — SODIUM CHLORIDE: 0.9 INJECTION, SOLUTION INTRAVENOUS at 04:08

## 2019-08-02 RX ADMIN — SUMATRIPTAN SUCCINATE 100 MG: 50 TABLET ORAL at 02:08

## 2019-08-02 RX ADMIN — HYDROCODONE BITARTRATE AND ACETAMINOPHEN 1 TABLET: 10; 325 TABLET ORAL at 02:08

## 2019-08-02 RX ADMIN — HYDROCODONE BITARTRATE AND ACETAMINOPHEN 1 TABLET: 5; 325 TABLET ORAL at 09:08

## 2019-08-02 RX ADMIN — SUMATRIPTAN SUCCINATE 100 MG: 50 TABLET ORAL at 05:08

## 2019-08-02 RX ADMIN — HYDROCODONE BITARTRATE AND ACETAMINOPHEN 1 TABLET: 5; 325 TABLET ORAL at 12:08

## 2019-08-02 NOTE — DISCHARGE INSTRUCTIONS
Discharge Instructions: Caring for Your Zohaib-Choudhary Drainage Tube  Your doctor discharges you with a Zohaib-Choudhary drainage tube. Doctors commonly leave this drain within the abdominal cavity after surgery. It helps drain and collect blood and body fluid after surgery. This can prevent swelling and reduces the risk for infection. The tube is held in place by a few stitches. It is covered with a bandage. Your doctor will remove the drain when he or she determines you no longer need it.  Home care  · Dont sleep on the same side as the tube.  · Secure the tube and bag inside your clothing with a safety pin. This helps keep the tube from being pulled out.  · Empty your drain at least twice a day. Empty it more often if the drain is full. Wash  and dry your hands before emptying the drain.  ¨ Lift the opening on the drain.  ¨ Drain the fluid into a measuring cup.  ¨ Record the amount of fluid each time you empty the drain. Include the date and time it was emptied. Share this information with your doctor on your next visit.  ¨ Squeeze the bulb with your hands until you hear air coming out of the bulb if your doctor has instructed you to do so (sometimes the bulb is used as a reservoir without suction). Check with your doctor about specific drain instructions.  ¨ Close the opening.  · Change the dressing around the tube every day.  ¨ Wash your hands.  ¨ Remove the old bandage.  ¨ Wash your hands again.  ¨ Wet a cotton swab and clean the skin around the incision and tube site. Use normal saline solution (salt and water). Or, you can use warm, soapy water.  ¨ Put a new bandage on the incision and tube site. Make the bandage large enough to cover the whole incision area.  ¨ Tape the bandage in place.  · Keep the bandage and tube site dry when you shower. Ask your healthcare provider about the best way to do this.  · Stripping the tube helps keep blood clots from blocking the tube. Ask your nurse how often you should  strip the tube. Stripping may not be needed, depending on where and why your doctor placed the tube. It may even be dangerous in some cases.   ¨ Hold the tubing where it leaves the skin, with one hand. This keeps it from pulling on the skin.  ¨ Pinch the tubing with the thumb and first finger of your other hand.  ¨ Slowly and firmly pull your thumb and first finger down the tubing. You may find it helpful to hold an alcohol swab between your fingers and the tube to lubricate the tubing.  ¨ If the pulling hurts or feels like its coming out of the skin, stop. Begin again more gently.  Follow-up care  Make a follow-up appointment as directed by our staff.     When to seek medical care  Call your healthcare provider right away if you have any of the following:  · New or increased pain around the tube  · Redness, swelling, or warmth around the incision or tube  · Drainage that is foul-smelling  · Vomiting  · Fever of 100.4°F (38°C)  · Fluid leaking around the tube  · Incision seems not to be healing  · Stitches become loose  · Tube falls out or breaks  · Drainage that changes from light pink to dark red  · Blood clots in the drainage bulb  · A sudden increase or decrease in the amount of drainage (over 30 mL)   Date Last Reviewed: 2/1/2017  © 6867-5374 [employer]. [address] [place] [address], [place] [address]. All rights reserved. This information is not intended as a substitute for professional medical care. Always follow your healthcare professional's instructions.

## 2019-08-02 NOTE — NURSING
Received admit from pacu via stretcher 70 y/o wf s/p left mastectomy with lymph node biopsy. Aaox4, no distress noted. No complaints at present. Will continue to monitor.

## 2019-08-02 NOTE — PLAN OF CARE
Problem: Adult Inpatient Plan of Care  Goal: Plan of Care Review  Outcome: Ongoing (interventions implemented as appropriate)  Patient resting in bed comfortably. iv intact and free of infection and irration,  fall precautions maintained no falls noted. New bra placed on pt, Call light in reach bed locked and in lowest position. Non skid socks on while out of bed. Patient instructed to call for assistance. Skin integrity maintained as patient is independent with frequent positioning and assisted when needed, C/o pain managed with PRN meds, No other complaints or concerns. Awaiting for dr mckeon to see pt. Will continue to monitor and follow careplan of care.

## 2019-08-02 NOTE — PROGRESS NOTES
Breast Surgery  Progress Note    S: No acute events overnight. Patient denies fevers, chill, nausea, and vomiting. She reports pain this morning, but this is related to chronic back pain, a migraine, and discomfort from surgical bra. Denies pain at left breast. Reports feeling well enough to go home today.    O:  Vitals:    08/02/19 0811   BP:    Pulse: 78   Resp: 20   Temp: 97.6 °F (36.4 °C)     PE:  Gen: resting comfortably in bed  CV: RRR, extremities warm and well perfused  Respit: breathing non-labored  Breast: Left mastectomy incision c/d/i. Soft around incision with no sign of hematoma. Skin flaps appear healthy. Surgical bra is tight fitting for her.    A:  Ms. Bowen is a 70 y/o F POD#1 from left mastectomy and SLN Bx. She is progressing well and meeting post operative milestones    P:  Diabetic diet with ISS  PRN PO pain medication  Will re-order home sumatriptan for migraine  Exchange for 3XL surgical bra  Continue to monitor drain output  Encourage ambulation  Plan for discharge later today

## 2019-08-02 NOTE — TRANSFER OF CARE
"Anesthesia Transfer of Care Note    Patient: Cecile Bowen    Procedure(s) Performed: Procedure(s) (LRB):  MASTECTOMY 23 hour stay (Left)  INJECTION, FOR SENTINEL NODE IDENTIFICATION (Left)  BIOPSY, LYMPH NODE, SENTINEL (Left)    Patient location: PACU    Anesthesia Type: general    Transport from OR: Transported from OR on 6-10 L/min O2 by face mask with adequate spontaneous ventilation    Post pain: adequate analgesia    Post assessment: no apparent anesthetic complications and tolerated procedure well    Post vital signs: stable    Level of consciousness: awake and alert    Nausea/Vomiting: no nausea/vomiting    Complications: none    Transfer of care protocol was followed      Last vitals:   Visit Vitals  BP (!) 172/79 (BP Location: Right arm, Patient Position: Lying)   Pulse 81   Temp 36.7 °C (98 °F) (Oral)   Resp (!) 22   Ht 5' 8" (1.727 m)   Wt 131.5 kg (290 lb)   SpO2 99%   Breastfeeding? No   BMI 44.09 kg/m²     "

## 2019-08-02 NOTE — NURSING TRANSFER
Nursing Transfer Note      8/1/2019     Transfer To: 503    Transfer via stretcher    Transfer with room air    Transported by landon Ceja    Medicines sent: yes-Insulin pen     Chart send with patient: Yes    Notified: sister         16

## 2019-08-02 NOTE — PLAN OF CARE
08/02/19 1413   Post-Acute Status   Post-Acute Authorization Other   Other Status No Post-Acute Service Needs   This SW in communication with  and medical team. SW will continue to follow for discharge needs and offer support as needed.    No SW needs identified at this time.    Jaqueline Curry LMSW  [facility]  [id number]

## 2019-08-02 NOTE — PLAN OF CARE
Problem: Adult Inpatient Plan of Care  Goal: Plan of Care Review  Outcome: Ongoing (interventions implemented as appropriate)  Fair hours. Med x2 for migraine headache. With some relief. VSS. Sp cath patent. Iv fluids infusing. Safety maintained.

## 2019-08-02 NOTE — HOSPITAL COURSE
Ms. Bowen presented on 8/1 for left mastectomy and sentinel lymph node biopsy. She received a paravertebral nerve block before the procedure. She tolerated the surgery well and the intraop frozen node was negative for neoplasm. She was transferred to the PACU following the operation and then to the floor. She was transitioned to PO pain meds and started on a diabetic diet. On POD#1 she reported her pain was well controlled, she was tolerating a diabetic diet, and she was ambulating. She had good bowel function with adequate UOP through her suprapubic catheter.

## 2019-08-02 NOTE — ANESTHESIA POSTPROCEDURE EVALUATION
Anesthesia Post Evaluation    Patient: Cecile Bowen    Procedure(s) Performed: Procedure(s) (LRB):  MASTECTOMY 23 hour stay (Left)  INJECTION, FOR SENTINEL NODE IDENTIFICATION (Left)  BIOPSY, LYMPH NODE, SENTINEL (Left)    Final Anesthesia Type: general  Patient location during evaluation: PACU  Patient participation: Yes- Able to Participate  Level of consciousness: awake and alert  Post-procedure vital signs: reviewed and stable  Pain management: adequate  Airway patency: patent  PONV status at discharge: No PONV  Anesthetic complications: no      Cardiovascular status: stable  Respiratory status: unassisted and spontaneous ventilation  Hydration status: euvolemic  Follow-up not needed.          Vitals Value Taken Time   /73 8/2/2019  4:28 AM   Temp 36.4 °C (97.6 °F) 8/2/2019  4:28 AM   Pulse 78 8/2/2019  4:28 AM   Resp 18 8/2/2019  4:28 AM   SpO2 93 % 8/2/2019  4:28 AM         Event Time     Out of Recovery 23:05:28          Pain/Audrey Score: Pain Rating Prior to Med Admin: 9 (8/2/2019  5:45 AM)  Pain Rating Post Med Admin: 0 (8/1/2019  2:00 PM)  Audrey Score: 10 (8/1/2019 10:09 PM)

## 2019-08-02 NOTE — HPI
Cecile Bowen is a 69 y.o. 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 She initially underwent a screening mammogram on 6/19/2019 which was abnormal. Follow-up mammogram and ultrasound showed a mass at the upper outer anterior 2 o'clock position. 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      Of note, her history is significant for CKD with GFR in the mid 25.5 on 5/30 and she has a suprapubic catheter. She has insulin dependent diabetes. Her last A1c was 6.7 on 5/30. She recently had an EKG on 6/14 which revealed sinus rhythm with LVH and TTE on 6/26 with EF of 55%.

## 2019-08-02 NOTE — NURSING
Discharge instructions given to pt and family. Pt completed teach back method for TEO drain teaching. Pt awake, alert. Pt ambulated in leach using walker without difficulty. NAD. RX given for pain medication pt request to fill outpatient. NAD.

## 2019-08-02 NOTE — BRIEF OP NOTE
[facility]  Brief Operative Note     SUMMARY     Surgery Date: 8/1/2019     Surgeon(s) and Role:     * Samia Fulton MD - Primary    Assisting Surgeon: None    Pre-op Diagnosis:  Malignant neoplasm of upper-outer quadrant of left breast in female, estrogen receptor positive [C50.412, Z17.0]    Post-op Diagnosis:  Post-Op Diagnosis Codes:     * Malignant neoplasm of upper-outer quadrant of left breast in female, estrogen receptor positive [C50.412, Z17.0]    Procedure(s) (LRB):  MASTECTOMY 23 hour stay (Left)  INJECTION, FOR SENTINEL NODE IDENTIFICATION (Left)  BIOPSY, LYMPH NODE, SENTINEL (Left)    Anesthesia: General    Description of the findings of the procedure: Patient presented for left mastectomy and SLN Bx. Left sentinel node frozen was negative intra op. Left breast removed with healthy and viable skin flaps remaining.    Estimated Blood Loss: 100 mL         Specimens:  Specimens (From admission, onward)    Start     Ordered    08/01/19 1808  Specimen to Pathology - Surgery  Once     Start Status     08/01/19 1808 Collected (08/01/19 1808) Order ID: [id number]       08/01/19 1808        1. Left axillary sentinel lymph node hot at 950  2. Left breast - short stitch superior, long lateral

## 2019-08-02 NOTE — OP NOTE
Operative Note     8/1/2019    PRE-OP DIAGNOSIS: Malignant neoplasm of upper-outer quadrant of left breast in female, estrogen receptor positive [C50.412, Z17.0]      POST-OP DIAGNOSIS: Post-Op Diagnosis Codes:     * Malignant neoplasm of upper-outer quadrant of left breast in female, estrogen receptor positive [C50.412, Z17.0]    Procedure(s):  MASTECTOMY 23 hour stay  INJECTION, FOR SENTINEL NODE IDENTIFICATION  BIOPSY, LYMPH NODE, SENTINEL     SURGEON: Surgeon(s) and Role:     * Samia Fulton MD - Primary    ANESTHESIA: General     OPERATIVE FINDINGS:  Healthy-appearing flaps at the completion of the mastectomy was benign frozen section of sentinel node    INDICATION FOR PROCEDURE: This patient presents with a history of invasive carcinoma of the left breast    PROCEDURE IN DETAIL:  Cecile Bowen is a 69 y.o. female brought to the operating room for definitive surgery of invasive carcinoma of the left breast.  The patient has elected to undergo left simple mastectomy with sentinel lymph node biopsy for danae assessment. The patient was informed of the possible risks and complications of the procedure, including but not limited to anesthetic risks, bleeding, infection, and need for additional surgery.  The patient concurred with the proposed plan, and has given informed consent.  The site of surgery was properly noted/marked in the preoperative holding area.    The patient was then brought to the operating room and placed in the supine position with both upper extremities extended.  regional and general anesthesia was administered. Perioperative antibiotics were administered consisting of Ancef and a time out was performed confirming the patient, site, and procedure.   The patient's left breast was injected with technetium to facilitate sentinel lymph node identification.The left chest and axilla was then prepped and draped in the usual sterile fashion.    We then turned our attention to the left breast where  an elliptical incision was fashioned to incorporate the nipple areolar complex.  The incision was made with a 10-blade and extended through the subcutaneous tissues with Bovie electrocautery.  Skin flaps were raised to the clavicle superiorly.  We then  turned our attention to the left axilla. The gamma probe was used to identify an area of increased radioactivity within the lower axilla. The clavipectoral sheath was sharply incised to reveal the level I axillary lymph nodes. The probe was used to identify a single node with increased radioactivity.  This node was brought into the operative field and carefully dissected free of the surrounding lymphovascular structures.  The highest ex vivo count of the node was 950.  The node was then sent to pathology for frozen section evaluation, labeled as sentinel node #1.  A total of 1 axillary sentinel nodes and 0 axillary non-sentinel nodes were identified, excised and submitted to pathology.  Bed counts were obtained to confirm that the 10% rule had not been violated.   The wound was irrigated with normal saline, and all bleeding points were secured with Bovie electrocautery.     We then proceeded to raise the remainder of the flaps to the lateral border of the sternum medially, to the inframammary fold inferiorly, and to the anterior border of the latissimus dorsi muscle laterally. The breast tissue was sharply excised off the chest wall taking care to incorporate the pectoralis fascia while leaving the serratus fascia behind.  The resulting mastectomy specimen was marked using a short stitch superiorly and long stitch laterally.  The breast was sent to pathology for permanent evaluation.      Frozen section danae evaluation revealed no evidence of metastatic disease.  Therefore, the operative field was irrigated with normal saline and all bleeding points were secured with Bovie electrocautery.  A 19 Fr maria d drain was placed under the mastectomy flap. The incision was  closed using an interrupted 3-0 vicryl deep dermal stitch followed by a running 4-0 vicryl subcuticular.      Dermabond was applied. A post surgical bra was placed on the patient. At the end of the operation, all sponge, instrument, and needle counts x 2 were correct.    ESTIMATED BLOOD LOSS: less than 50 mL    COMPLICATIONS:  None    DISPOSITION: PACU - hemodynamically stable.    ATTESTATION:   I performed the procedure.

## 2019-08-03 ENCOUNTER — PATIENT MESSAGE (OUTPATIENT)
Dept: NEPHROLOGY | Facility: CLINIC | Age: 69
End: 2019-08-03

## 2019-08-06 ENCOUNTER — PATIENT MESSAGE (OUTPATIENT)
Dept: SURGERY | Facility: HOSPITAL | Age: 69
End: 2019-08-06

## 2019-08-06 ENCOUNTER — PATIENT MESSAGE (OUTPATIENT)
Dept: PHYSICAL MEDICINE AND REHAB | Facility: CLINIC | Age: 69
End: 2019-08-06

## 2019-08-08 ENCOUNTER — TELEPHONE (OUTPATIENT)
Dept: PHYSICAL MEDICINE AND REHAB | Facility: CLINIC | Age: 69
End: 2019-08-08

## 2019-08-08 NOTE — DISCHARGE SUMMARY
[facility]  General Surgery  Discharge Summary      Patient Name: Cecile Bowen  MRN: [id number]  Admission Date: 8/1/2019  Hospital Length of Stay: 0 days  Discharge Date and Time: 8/2/2019  4:40 PM  Attending Physician: Ania att. providers found   Discharging Provider: Jordyn Browne MD  Primary Care Provider: Kailey Navarro MD     HPI: Cecile Bowen is a 69 y.o. 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     The patient initially underwent a screening mammogram on 6/19/2019 which was abnormal. Follow-up mammogram and ultrasound showed a mass at the upper outer anterior 2 o'clock position. 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      Of note, her history is significant for CKD with GFR in the mid 25.5 on 5/30. She has insulin dependent diabetes. Her last A1c was 6.7 on 5/30. She recently had an EKG on 6/14 which revealed sinus rhythm with LVH and TTE on 6/26 with EF of 55%.      Patient does routinely do self breast exams.  Patient has not noted a change on breast exam.  Patient denies nipple discharge. Patient admits to to previous breast biopsy in 1986 which patient reports had precancerous cells. Patient denies a personal history of breast cancer.       Procedure(s) (LRB):  MASTECTOMY 23 hour stay (Left)  INJECTION, FOR SENTINEL NODE IDENTIFICATION (Left)  BIOPSY, LYMPH NODE, SENTINEL (Left)     Hospital Course: Patient presented for left mastectomy and sentinel lymph node biopsy for left breast IDC and DCIS. She tolerated the procedure well and was transferred to the PACU following the operation. She was then transferred to the floor for overnight observation. The following morning, she reported she was feeling well and ready to go home. Her pain was well controlled with PO medication, she was ambulating, tolerating a diet, and had good urine  output through her suprapubic catheter. She was discharged POD#1 in good condition.    Consults: none    Significant Diagnostic Studies: none    Pending Diagnostic Studies:     None        Final Active Diagnoses:    Diagnosis Date Noted POA    PRINCIPAL PROBLEM:  Malignant neoplasm of upper-outer quadrant of left breast in female, estrogen receptor positive [C50.412, Z17.0] 07/19/2019 Not Applicable    Malignant neoplasm of left breast, estrogen receptor positive [C50.912, Z17.0] 08/01/2019 Not Applicable    At risk for lymphedema [Z91.89] 07/24/2019 Yes      Problems Resolved During this Admission:      Discharged Condition: good    Disposition: Home or Self Care    Follow Up:  Follow-up Information     Samia Fulton MD On 8/13/2019.    Specialties:  General Surgery, Breast Surgery  Why:  Tuesday 8/13 @ 1:45 pm - post operative check up  Contact information:  [address]  [facility] [address]  [phone]                 Patient Instructions:      Other restrictions (specify):   Order Comments: Okay to shower later today  Please do not soak in water such as a bath, pool, or hot tub for one week  Wear surgical bra or other compressive bra with fluffs at all times, except while showering, for one week     Notify your health care provider if you experience any of the following:  temperature >100.4     Notify your health care provider if you experience any of the following:  persistent nausea and vomiting or diarrhea     Notify your health care provider if you experience any of the following:  severe uncontrolled pain     Notify your health care provider if you experience any of the following:  redness, tenderness, or signs of infection (pain, swelling, redness, odor or green/yellow discharge around incision site)     Notify your health care provider if you experience any of the following:  difficulty breathing or increased cough     Notify your health care provider if you experience any of the following:   "severe persistent headache     Notify your health care provider if you experience any of the following:  worsening rash     Notify your health care provider if you experience any of the following:  persistent dizziness, light-headedness, or visual disturbances     Notify your health care provider if you experience any of the following:  increased confusion or weakness     No dressing needed     Activity as tolerated     Medications:  Reconciled Home Medications:      Medication List      START taking these medications    oxyCODONE 5 MG immediate release tablet  Commonly known as:  ROXICODONE  Take 1 tablet (5 mg total) by mouth every 4 (four) hours as needed for Pain.        CONTINUE taking these medications    HYDROcodone-acetaminophen  mg per tablet  Commonly known as:  NORCO  Take 1 tablet by mouth every 6 (six) hours as needed.        ASK your doctor about these medications    AIMOVIG AUTOINJECTOR 140 mg/mL Atin  Generic drug:  erenumab-aooe  Inject 1 syringe (140 mg total) into the skin every 28 days.     BD INSULIN SYRINGE ULTRA-FINE 0.5 mL 31 gauge x 5/16" Syrg  Generic drug:  insulin syringe-needle U-100  USE WITH INSULIN 4 TIMES A DAY     blood sugar diagnostic Strp  ONE TOUCH ULTRA TEST STRIPS//Check blood sugars QID     blood-glucose meter kit  ONE TOUCH ULTRA     butalbital-acetaminophen-caffeine -40 mg -40 mg per tablet  Commonly known as:  FIORICET, ESGIC  TAKE 1 TABLET BY MOUTH WHEN NOT CONTROLLED BY OTHER MEDICATIONS. NO MORE THAN 10 TABS PER 30 DAYS     cloNIDine 0.1 MG tablet  Commonly known as:  CATAPRES  Take 1 tablet (0.1 mg total) by mouth once. for 1 dose     ergocalciferol 50,000 unit Cap  Commonly known as:  VITAMIN D2  TAKE ONE CAPSULE BY MOUTH ONE TIME PER WEEK     ferrous sulfate 325 (65 FE) MG EC tablet  Take 325 mg by mouth nightly.     gemfibrozil 600 MG tablet  Commonly known as:  LOPID  TAKE 1 TABLET BY MOUTH EVERY OTHER DAY     insulin glargine 100 units/mL (3mL) " "SubQ pen  Commonly known as:  LANTUS SOLOSTAR U-100 INSULIN  Inject 24 units daily.     insulin lispro 100 unit/mL injection  Commonly known as:  HumaLOG U-100 Insulin  Inject 5-7 units w/ meals plus scale 180-230+2, 231-280+4, 281-330+6, 331-380+8, >380 +10.     lancets Misc  Ultra 2 lancets to check blood sugar BID     magnesium oxide 400 mg (241.3 mg magnesium) tablet  Commonly known as:  MAG-OX  TAKE 1 TABLET (400 MG TOTAL) BY MOUTH 2 (TWO) TIMES DAILY.     methocarbamol 750 MG Tab  Commonly known as:  ROBAXIN  Take 1-2 tablets (750-1,500 mg total) by mouth 3 (three) times daily as needed.     multivitamin with minerals tablet  Take 1 tablet by mouth every morning.     oxybutynin 10 MG 24 hr tablet  Commonly known as:  DITROPAN-XL  TAKE 1 TABLET (10 MG TOTAL) BY MOUTH ONCE DAILY.     pen needle, diabetic 31 gauge x 5/16" Ndle  Commonly known as:  BD ULTRA-FINE SHORT PEN NEEDLE  Uses w/ lantus daily. 90 day     riboflavin (vitamin B2) 400 mg Tab  Take 400 mg by mouth once daily.     rosuvastatin 40 MG Tab  Commonly known as:  CRESTOR  Take 40 mg by mouth nightly.     scopolamine 1.3-1.5 mg (1 mg over 3 days)  Commonly known as:  TRANSDERM-SCOP  Place 1 patch onto the skin every 72 hours.     sodium bicarbonate 650 MG tablet  TAKE 1 TABLET (650 MG TOTAL) BY MOUTH 3 (THREE) TIMES DAILY.     sumatriptan 100 MG tablet  Commonly known as:  IMITREX  TAKE 1 TABLET (100 MG TOTAL) BY MOUTH 2 (TWO) TIMES DAILY AS NEEDED FOR MIGRAINE.     SYNTHROID 50 MCG tablet  Generic drug:  levothyroxine  TAKE 1 TABLET (50 MCG TOTAL) BY MOUTH ONCE DAILY.     topiramate 200 MG Tab  Commonly known as:  TOPAMAX  TAKE 1 TABLET BY MOUTH TWICE A DAY     traZODone 100 MG tablet  Commonly known as:  DESYREL  TAKE 1 TABLET BY MOUTH AT BEDTIME MAY TAKE AN EXTRA HALF TABLET IF NEEDED     venlafaxine 75 MG 24 hr capsule  Commonly known as:  EFFEXOR-XR  Take 2 capsules (150 mg total) by mouth once daily.     VITAMIN B-12 5,000 mcg Subl  Generic " drug:  cyanocobalamin (vitamin B-12)  Place 1 tablet under the tongue every morning.            Jordyn Browne MD  General Surgery  [facility]

## 2019-08-09 ENCOUNTER — PATIENT MESSAGE (OUTPATIENT)
Dept: ADMINISTRATIVE | Facility: OTHER | Age: 69
End: 2019-08-09

## 2019-08-09 NOTE — ADDENDUM NOTE
Addendum  created 08/09/19 1243 by Crow Hernandez MD    Intraprocedure Blocks edited, Sign clinical note

## 2019-08-12 RX ORDER — BUTALBITAL, ACETAMINOPHEN AND CAFFEINE 50; 325; 40 MG/1; MG/1; MG/1
TABLET ORAL
Qty: 10 TABLET | Refills: 0 | Status: SHIPPED | OUTPATIENT
Start: 2019-08-12 | End: 2019-09-09 | Stop reason: SDUPTHER

## 2019-08-12 NOTE — PROGRESS NOTES
"[facility]       Post-Op        REFERRING PHYSICIAN:  Kailey Navarro MD    MEDICAL ONCOLOGIST:    pending  RADIATION ONCOLOGIST:   pending    DIAGNOSIS:    This is a 69 y.o. female with a stage ? ER + WA + HER2 - IDC with DCIS of the left breast.    TREATMENT SUMMARY:  The patient is status post left total mastectomy and sentinel node biopsy on 8/1/2019.  Final pathology is pending.    INTERVAL HISTORY:   Cecile Bowen comes in for a post-op check.  She denies fever, chills, chest pain or shortness of breath.  Her pain is well controlled.      MEDICATIONS:  Current Outpatient Medications   Medication Sig Dispense Refill    BD INSULIN SYRINGE ULTRA-FINE 0.5 mL 31 gauge x 5/16" Syrg USE WITH INSULIN 4 TIMES A  each 12    blood sugar diagnostic Strp ONE TOUCH ULTRA TEST STRIPS//Check blood sugars  strip 6    blood-glucose meter kit ONE TOUCH ULTRA 1 each 0    butalbital-acetaminophen-caffeine -40 mg (FIORICET, ESGIC) -40 mg per tablet TAKE 1 TABLET BY MOUTH WHEN NOT CONTROLLED BY OTHER MEDS. NO MORE THAN 10 TABS PER 30 DAYS 10 tablet 0    cloNIDine (CATAPRES) 0.1 MG tablet Take 1 tablet (0.1 mg total) by mouth once. for 1 dose (Patient taking differently: Take 0.1 mg by mouth daily as needed. ) 30 tablet 3    cyanocobalamin, vitamin B-12, (VITAMIN B-12) 5,000 mcg Subl Place 1 tablet under the tongue every morning.       erenumab-aooe 140 mg/mL AtIn Inject 1 syringe (140 mg total) into the skin every 28 days. (Patient taking differently: Inject 140 mg into the skin every 28 days. Takes at the end of every month) 1 mL 11    ergocalciferol (VITAMIN D2) 50,000 unit Cap TAKE ONE CAPSULE BY MOUTH ONE TIME PER WEEK (Patient taking differently: Take 50,000 Units by mouth every 7 days. TAKE ONE CAPSULE BY MOUTH ONE TIME PER WEEK, Takes on Tuesdays) 12 capsule 3    ferrous sulfate 325 (65 FE) MG EC tablet Take 325 mg by mouth nightly.       gemfibrozil (LOPID) 600 " "MG tablet TAKE 1 TABLET BY MOUTH EVERY OTHER DAY (Patient taking differently: Take 600 mg by mouth. TAKE 1 TABLET BY MOUTH EVERY OTHER DAY at bedtime) 60 tablet 2    HYDROcodone-acetaminophen (NORCO)  mg per tablet Take 1 tablet by mouth every 6 (six) hours as needed. 120 tablet 0    insulin (LANTUS SOLOSTAR U-100 INSULIN) glargine 100 units/mL (3mL) SubQ pen Inject 24 units daily. (Patient taking differently: Inject 24 Units into the skin every morning. Inject 24 units daily.) 15 mL 6    insulin lispro (HUMALOG U-100 INSULIN) 100 unit/mL injection Inject 5-7 units w/ meals plus scale 180-230+2, 231-280+4, 281-330+6, 331-380+8, >380 +10. (Patient taking differently: 3 (three) times daily before meals. Injects 7- 5- 7 units w/ meals plus scale 180-230+2, 231-280+4, 281-330+6, 331-380+8, >380 +10.) 20 mL 6    lancets Misc Ultra 2 lancets to check blood sugar  each 6    magnesium oxide (MAG-OX) 400 mg (241.3 mg magnesium) tablet TAKE 1 TABLET (400 MG TOTAL) BY MOUTH 2 (TWO) TIMES DAILY. 180 tablet 1    methocarbamol (ROBAXIN) 750 MG Tab Take 1-2 tablets (750-1,500 mg total) by mouth 3 (three) times daily as needed. 180 tablet 3    multivitamin with minerals tablet Take 1 tablet by mouth every morning.       oxybutynin (DITROPAN-XL) 10 MG 24 hr tablet TAKE 1 TABLET (10 MG TOTAL) BY MOUTH ONCE DAILY. (Patient taking differently: Take 10 mg by mouth nightly. ) 30 tablet 9    oxyCODONE (ROXICODONE) 5 MG immediate release tablet Take 1 tablet (5 mg total) by mouth every 4 (four) hours as needed for Pain. 20 tablet 0    pen needle, diabetic (BD ULTRA-FINE SHORT PEN NEEDLE) 31 gauge x 5/16" Ndle Uses w/ lantus daily. 90 day 100 each 3    riboflavin, vitamin B2, 400 mg Tab Take 400 mg by mouth once daily. (Patient taking differently: Take 400 mg by mouth every morning. ) 90 tablet 3    rosuvastatin (CRESTOR) 40 MG Tab Take 40 mg by mouth nightly.       scopolamine (TRANSDERM-SCOP) 1.3-1.5 mg (1 mg " over 3 days) Place 1 patch onto the skin every 72 hours. (Patient taking differently: Place 1 patch onto the skin every 72 hours as needed. ) 4 patch 0    sodium bicarbonate 650 MG tablet TAKE 1 TABLET (650 MG TOTAL) BY MOUTH 3 (THREE) TIMES DAILY. (Patient taking differently: TAKE 1 TABLET (650 MG TOTAL) BY MOUTH 1 IN THE MORNING AND 2 TABLETS IN THE EVENING) 270 tablet 2    sumatriptan (IMITREX) 100 MG tablet TAKE 1 TABLET (100 MG TOTAL) BY MOUTH 2 (TWO) TIMES DAILY AS NEEDED FOR MIGRAINE. 9 tablet 6    SYNTHROID 50 mcg tablet TAKE 1 TABLET (50 MCG TOTAL) BY MOUTH ONCE DAILY. (Patient taking differently: TAKE 1 TABLET (50 MCG TOTAL) BY MOUTH ONCE DAILY, morning) 30 tablet 6    topiramate (TOPAMAX) 200 MG Tab TAKE 1 TABLET BY MOUTH TWICE A  tablet 2    traZODone (DESYREL) 100 MG tablet TAKE 1 TABLET BY MOUTH AT BEDTIME MAY TAKE AN EXTRA HALF TABLET IF NEEDED (Patient taking differently: Take 150 mg by mouth nightly. TAKE 1 TABLET BY MOUTH AT BEDTIME MAY TAKE AN EXTRA HALF TABLET IF NEEDED) 45 tablet 12    venlafaxine (EFFEXOR-XR) 75 MG 24 hr capsule Take 2 capsules (150 mg total) by mouth once daily. (Patient taking differently: Take 150 mg by mouth nightly. ) 180 capsule 1     No current facility-administered medications for this visit.        ALLERGIES:   Review of patient's allergies indicates:  No Known Allergies    PHYSICAL EXAMINATION:   General:  This is a well appearing female with appropriate speech, affect and gait.     Breast:  Incision clean, dry, and intact.     IMPRESSION:   The patient has had an uneventful postoperative course.    PLAN:   1. return in 6 months for a follow up office visit and breast exam  2. right mammogram in 6/19/2020 months  3. The patient is advised in continued exam of the breast chest wall and to report to this office sooner should she note any areas of abnormality or concern.   4.  She has been instructed to meet with med onc and rad onc for discussion of  adjuvant treatment recommendations  5. We will remove the drain today  6. Will keep checking for path results.

## 2019-08-13 ENCOUNTER — OFFICE VISIT (OUTPATIENT)
Dept: SURGERY | Facility: CLINIC | Age: 69
End: 2019-08-13
Payer: MEDICARE

## 2019-08-13 ENCOUNTER — TELEPHONE (OUTPATIENT)
Dept: SURGERY | Facility: CLINIC | Age: 69
End: 2019-08-13

## 2019-08-13 VITALS
HEART RATE: 116 BPM | HEIGHT: 68 IN | WEIGHT: 293 LBS | SYSTOLIC BLOOD PRESSURE: 146 MMHG | TEMPERATURE: 98 F | DIASTOLIC BLOOD PRESSURE: 86 MMHG | BODY MASS INDEX: 44.41 KG/M2

## 2019-08-13 DIAGNOSIS — C50.412 MALIGNANT NEOPLASM OF UPPER-OUTER QUADRANT OF LEFT BREAST IN FEMALE, ESTROGEN RECEPTOR POSITIVE: Primary | ICD-10-CM

## 2019-08-13 DIAGNOSIS — Z17.0 MALIGNANT NEOPLASM OF UPPER-OUTER QUADRANT OF LEFT BREAST IN FEMALE, ESTROGEN RECEPTOR POSITIVE: Primary | ICD-10-CM

## 2019-08-13 PROCEDURE — 99024 PR POST-OP FOLLOW-UP VISIT: ICD-10-PCS | Mod: POP,,, | Performed by: SURGERY

## 2019-08-13 PROCEDURE — 99024 POSTOP FOLLOW-UP VISIT: CPT | Mod: POP,,, | Performed by: SURGERY

## 2019-08-13 PROCEDURE — 99999 PR PBB SHADOW E&M-EST. PATIENT-LVL III: CPT | Mod: PBBFAC,,, | Performed by: SURGERY

## 2019-08-13 PROCEDURE — 99213 OFFICE O/P EST LOW 20 MIN: CPT | Mod: PBBFAC | Performed by: SURGERY

## 2019-08-13 PROCEDURE — 99999 PR PBB SHADOW E&M-EST. PATIENT-LVL III: ICD-10-PCS | Mod: PBBFAC,,, | Performed by: SURGERY

## 2019-08-13 NOTE — TELEPHONE ENCOUNTER
Call to pt to see what the output is for her TEO drain . Pt has post op appt today, but path not final. If TEO drain > 30 cc per day, pt would not need to come in for an appt. Per pt , TEO output has only bee 10-12 cc per day for the last 3 days. Pt to come in for appt for drain removal toady as scheduled, but knowing that the path results is not complete. Pt asks if her genetic test results were back yet. Informed pt that the genetics tests did return and are negative. Pt to receive a copy of these results for her records.

## 2019-08-14 ENCOUNTER — ANESTHESIA EVENT (OUTPATIENT)
Dept: SURGERY | Facility: HOSPITAL | Age: 69
End: 2019-08-14
Payer: MEDICARE

## 2019-08-14 ENCOUNTER — LAB VISIT (OUTPATIENT)
Dept: LAB | Facility: HOSPITAL | Age: 69
End: 2019-08-14
Attending: ANESTHESIOLOGY
Payer: MEDICARE

## 2019-08-14 ENCOUNTER — OFFICE VISIT (OUTPATIENT)
Dept: UROLOGY | Facility: CLINIC | Age: 69
End: 2019-08-14
Payer: MEDICARE

## 2019-08-14 VITALS
DIASTOLIC BLOOD PRESSURE: 93 MMHG | HEART RATE: 102 BPM | WEIGHT: 293 LBS | BODY MASS INDEX: 44.41 KG/M2 | HEIGHT: 68 IN | SYSTOLIC BLOOD PRESSURE: 138 MMHG

## 2019-08-14 DIAGNOSIS — R82.71 BACTERIA IN URINE: ICD-10-CM

## 2019-08-14 DIAGNOSIS — Z01.818 PREOPERATIVE TESTING: ICD-10-CM

## 2019-08-14 DIAGNOSIS — Z43.5 ENCOUNTER FOR CARE OR REPLACEMENT OF SUPRAPUBIC TUBE: ICD-10-CM

## 2019-08-14 DIAGNOSIS — Z93.59 CHRONIC SUPRAPUBIC CATHETER: Primary | ICD-10-CM

## 2019-08-14 DIAGNOSIS — N32.89 BLADDER SPASMS: ICD-10-CM

## 2019-08-14 DIAGNOSIS — Z01.818 PREOPERATIVE TESTING: Primary | ICD-10-CM

## 2019-08-14 DIAGNOSIS — Z90.12 S/P MASTECTOMY, LEFT: ICD-10-CM

## 2019-08-14 LAB
ANION GAP SERPL CALC-SCNC: 10 MMOL/L (ref 8–16)
BACTERIA #/AREA URNS AUTO: ABNORMAL /HPF
BILIRUB UR QL STRIP: ABNORMAL
BUN SERPL-MCNC: 21 MG/DL (ref 8–23)
CALCIUM SERPL-MCNC: 8.8 MG/DL (ref 8.7–10.5)
CALCIUM SERPL-MCNC: 8.8 MG/DL (ref 8.7–10.5)
CHLORIDE SERPL-SCNC: 103 MMOL/L (ref 95–110)
CLARITY UR REFRACT.AUTO: CLEAR
CO2 SERPL-SCNC: 22 MMOL/L (ref 23–29)
COLOR UR AUTO: ABNORMAL
CREAT SERPL-MCNC: 1.9 MG/DL (ref 0.5–1.4)
ERYTHROCYTE [DISTWIDTH] IN BLOOD BY AUTOMATED COUNT: 13 % (ref 11.5–14.5)
EST. GFR  (AFRICAN AMERICAN): 31 ML/MIN/1.73 M^2
EST. GFR  (NON AFRICAN AMERICAN): 26.9 ML/MIN/1.73 M^2
GLUCOSE SERPL-MCNC: 217 MG/DL (ref 70–110)
GLUCOSE UR QL STRIP: NEGATIVE
HCT VFR BLD AUTO: 38.7 % (ref 37–48.5)
HGB BLD-MCNC: 12.3 G/DL (ref 12–16)
HGB UR QL STRIP: ABNORMAL
HYALINE CASTS UR QL AUTO: 0 /LPF
KETONES UR QL STRIP: NEGATIVE
LEUKOCYTE ESTERASE UR QL STRIP: ABNORMAL
MCH RBC QN AUTO: 29.4 PG (ref 27–31)
MCHC RBC AUTO-ENTMCNC: 31.8 G/DL (ref 32–36)
MCV RBC AUTO: 93 FL (ref 82–98)
MICROSCOPIC COMMENT: ABNORMAL
NITRITE UR QL STRIP: NEGATIVE
PH UR STRIP: 6 [PH] (ref 5–8)
PHOSPHATE SERPL-MCNC: 3.5 MG/DL (ref 2.7–4.5)
PLATELET # BLD AUTO: 313 K/UL (ref 150–350)
PMV BLD AUTO: 9.7 FL (ref 9.2–12.9)
POTASSIUM SERPL-SCNC: 4.2 MMOL/L (ref 3.5–5.1)
PROT UR QL STRIP: ABNORMAL
RBC # BLD AUTO: 4.18 M/UL (ref 4–5.4)
RBC #/AREA URNS AUTO: 61 /HPF (ref 0–4)
SODIUM SERPL-SCNC: 135 MMOL/L (ref 136–145)
SP GR UR STRIP: 1.02 (ref 1–1.03)
SQUAMOUS #/AREA URNS AUTO: 2 /HPF
URN SPEC COLLECT METH UR: ABNORMAL
WBC # BLD AUTO: 9.36 K/UL (ref 3.9–12.7)
WBC #/AREA URNS AUTO: >100 /HPF (ref 0–5)
WBC CLUMPS UR QL AUTO: ABNORMAL

## 2019-08-14 PROCEDURE — 99214 PR OFFICE/OUTPT VISIT, EST, LEVL IV, 30-39 MIN: ICD-10-PCS | Mod: S$PBB,25,, | Performed by: NURSE PRACTITIONER

## 2019-08-14 PROCEDURE — 87088 URINE BACTERIA CULTURE: CPT

## 2019-08-14 PROCEDURE — 81001 URINALYSIS AUTO W/SCOPE: CPT

## 2019-08-14 PROCEDURE — 84100 ASSAY OF PHOSPHORUS: CPT

## 2019-08-14 PROCEDURE — 87077 CULTURE AEROBIC IDENTIFY: CPT

## 2019-08-14 PROCEDURE — 99999 PR PBB SHADOW E&M-EST. PATIENT-LVL III: CPT | Mod: PBBFAC,,, | Performed by: NURSE PRACTITIONER

## 2019-08-14 PROCEDURE — 51705 CHANGE OF BLADDER TUBE: CPT | Mod: PBBFAC | Performed by: NURSE PRACTITIONER

## 2019-08-14 PROCEDURE — 99213 OFFICE O/P EST LOW 20 MIN: CPT | Mod: PBBFAC,25 | Performed by: NURSE PRACTITIONER

## 2019-08-14 PROCEDURE — 51705 CHANGE OF BLADDER TUBE: CPT | Mod: S$PBB,,, | Performed by: NURSE PRACTITIONER

## 2019-08-14 PROCEDURE — 87086 URINE CULTURE/COLONY COUNT: CPT

## 2019-08-14 PROCEDURE — 36415 COLL VENOUS BLD VENIPUNCTURE: CPT

## 2019-08-14 PROCEDURE — 87186 SC STD MICRODIL/AGAR DIL: CPT

## 2019-08-14 PROCEDURE — 85027 COMPLETE CBC AUTOMATED: CPT

## 2019-08-14 PROCEDURE — 80048 BASIC METABOLIC PNL TOTAL CA: CPT

## 2019-08-14 PROCEDURE — 99214 OFFICE O/P EST MOD 30 MIN: CPT | Mod: S$PBB,25,, | Performed by: NURSE PRACTITIONER

## 2019-08-14 PROCEDURE — 51705 PR CHANGE OF BLADDER TUBE,SIMPLE: ICD-10-PCS | Mod: S$PBB,,, | Performed by: NURSE PRACTITIONER

## 2019-08-14 PROCEDURE — 99999 PR PBB SHADOW E&M-EST. PATIENT-LVL III: ICD-10-PCS | Mod: PBBFAC,,, | Performed by: NURSE PRACTITIONER

## 2019-08-14 NOTE — PROGRESS NOTES
Subjective:       Patient ID: Cecile Bowen is a 69 y.o. female.    Chief Complaint: Neurogenic bladder    Cecile Bowen is a 8 y.o. Diabetic Female with history of bilateral hydronephrosis, incomplete bladder emptying which is managed by SPT (16fr).  S/p Cystoscopy with bladder botox injection (300u) 09/17/2018 with Dr. Whittington.    She had recently discovered breast cancer; s/p Mastectomy 08/01/2019  Malignant neoplasm of upper-outer quadrant of left breast in female, estrogen receptor positive   No need for chemo/radiation therapy.    Her last SPT change was 06/04/2019.    Here today for scheduled SPT change.  Bladder spasms present but greatly reduced with Botox 03/25/2019 with Dr. Whittington.  She is planning on Botox with Dr. Whittington 08/26/2019  No fever, n/v    She reports improved migraines with new medication      S/p Mastectomy 08/01/2019                Past Medical History:    Diabetes type 2, uncontrolled                   12/12/2012    Obesity                                         12/12/2012    Hypertension                                    12/12/2012    DJD (degenerative joint disease)                12/12/2012    Hypothyroidism                                  12/12/2012    Nuclear sclerosis - Both Eyes                   3/24/2014     Migraine                                                      Past Surgical History:    TOTAL ABDOMINAL HYSTERECTOMY W/ BILATERAL SALP*  1985          GALLBLADDER SURGERY                              2006          TONSILLECTOMY, ADENOIDECTOMY                                   OVARIAN CYST SURGERY                             1985          HYSTERECTOMY                                                   DILATION AND CURETTAGE OF UTERUS                               Review of patient's family history indicates:    Diabetes                       Sister                    Kidney disease                 Sister                    ALS                            Mother   "                    Comment: d.    Cancer                         Maternal Grandmother        Comment: león. colon    Cancer                         Paternal Grandfather        Comment: d. lung    Diabetes                       Maternal Aunt             Kidney disease                 Maternal Aunt             Diabetes                       Maternal Uncle            Amblyopia                      Neg Hx                    Blindness                      Neg Hx                    Cataracts                      Neg Hx                    Glaucoma                       Neg Hx                    Macular degeneration           Neg Hx                    Retinal detachment             Neg Hx                    Strabismus                     Neg Hx                      Social History    Marital Status:             Spouse Name:                       Years of Education:                 Number of children:               Occupational History    None on file    Social History Main Topics    Smoking Status: Never Smoker                      Smokeless Status: Never Used                        Alcohol Use: No              Drug Use: No              Sexual Activity: Not Currently           Birth Control/Protection: Abstinence    Other Topics            Concern    None on file    Social History Narrative    Single      Lives w/ sister  And brother     both are helping her during her post hosp recovery period        Allergies:  Review of patient's allergies indicates no known allergies.    Medications:  Current outpatient prescriptions:amitriptyline (ELAVIL) 10 MG tablet, TAKE 3 TABLETS BY MOUTH IN THE EVENING, Disp: 90 tablet, Rfl: 5;  aspirin (ECOTRIN) 81 MG EC tablet, Take 81 mg by mouth once daily., Disp: , Rfl: ;  BD INSULIN PEN NEEDLE UF SHORT 31 X 5/16 " Ndle, USE ONCE DAILY WITH LANTUS SOLOSTAR PEN INSULIN, Disp: 100 each, Rfl: 11  butalbital-acetaminophen-caffeine -40 mg (FIORICET, ESGIC) -40 mg per tablet, TAKE " "1 TABLET EVERY 4 TO 6 HOURS, Disp: 30 tablet, Rfl: 0;  cyanocobalamin, vitamin B-12, (VITAMIN B-12) 2,500 mcg Subl, Place 2,500 mcg under the tongue once daily., Disp: , Rfl: ;  diphenhydrAMINE (BENADRYL) 25 mg capsule, Take 25 mg by mouth every 6 (six) hours as needed., Disp: , Rfl:   ferrous sulfate 325 (65 FE) MG EC tablet, Take 325 mg by mouth 3 (three) times daily with meals., Disp: , Rfl: ;  fluticasone (FLONASE) 50 mcg/actuation nasal spray, 1 SPRAY IN EACH NOSTRIL DAILY (Patient taking differently: 1 SPRAY IN EACH NOSTRIL DAILY PRN), Disp: 16 g, Rfl: 1;  furosemide (LASIX) 20 MG tablet, Take 1 tablet (20 mg total) by mouth once daily., Disp: 4 tablet, Rfl: 0  gemfibrozil (LOPID) 600 MG tablet, TAKE 1 TABLET BY MOUTH TWICE A DAY, Disp: 60 tablet, Rfl: 5;  (START ON 4/29/2015) hydrocodone-acetaminophen 10-325mg (NORCO)  mg Tab, Take 1 tablet by mouth every 6 (six) hours as needed., Disp: 120 tablet, Rfl: 0;  insulin lispro (HUMALOG) 100 unit/mL injection, Inject 7 Units into the skin 3 (three) times daily before meals., Disp: 6.3 mL, Rfl: 11  insulin syringe-needle U-100 (BD INSULIN SYRINGE ULTRA-FINE) 1/2 mL 31 x 15/64" Syrg, 1 Box by Misc.(Non-Drug; Combo Route) route 4 (four) times daily., Disp: 100 Syringe, Rfl: 12;  lancets (ONE TOUCH DELICA LANCETS) Misc, Ultra 2 lancets to check blood sugar BID, Disp: 100 each, Rfl: 6;  LANTUS SOLOSTAR 100 unit/mL (3 mL) InPn pen, , Disp: , Rfl: 3  LIDODERM 5 %(700 mg/patch), APPLY 1 PATCH TO SKIN DAILY (LEAVING ON FOR 12 HOURS AND OFF FOR 12 HOURS), Disp: 30 patch, Rfl: 6;  magnesium oxide (MAG-OX) 400 mg tablet, TAKE 1 TABLET (400 MG TOTAL) BY MOUTH 2 (TWO) TIMES DAILY., Disp: 60 tablet, Rfl: 11;  methocarbamol (ROBAXIN) 750 MG Tab, TAKE 1 TO 2 TABLETS BY MOUTH 3 TIMES A DAY (Patient taking differently: Take 2 tablets twice daily), Disp: 120 tablet, Rfl: 3  methocarbamol (ROBAXIN) 750 MG Tab, TAKE 1 TO 2 TABLETS BY MOUTH 3 TIMES A DAY, Disp: 120 tablet, Rfl: 3;  " methylPREDNISolone (MEDROL DOSEPACK) 4 mg tablet, use as directed, Disp: 21 tablet, Rfl: 0;  multivitamin with minerals tablet, Take 1 tablet by mouth once daily., Disp: , Rfl: ;  pravastatin (PRAVACHOL) 40 MG tablet, TAKE 1 TABLET BY MOUTH EVERY DAY, Disp: 30 tablet, Rfl: 2  pyridoxine (B-6) 100 MG Tab, Take 1 tablet (100 mg total) by mouth once daily., Disp: 30 tablet, Rfl: 12;  scopolamine (TRANSDERM-SCOP) 1.5 mg, Place 1 patch (1.5 mg total) onto the skin every 72 hours., Disp: 4 patch, Rfl: 0;  sulfamethoxazole-trimethoprim 800-160mg (BACTRIM DS) 800-160 mg Tab, Take 1 tablet by mouth 2 (two) times daily., Disp: , Rfl: 0  sumatriptan (IMITREX) 100 MG tablet, TAKE 1 TABLET (100 MG TOTAL) BY MOUTH ONCE., Disp: 9 tablet, Rfl: 6;  SYNTHROID 125 mcg tablet, TAKE 1 TABLET BY MOUTH DAILY, Disp: 90 tablet, Rfl: 4;  topiramate (TOPAMAX) 100 MG tablet, TAKE 1 TABLET (100 MG TOTAL) BY MOUTH 2 (TWO) TIMES DAILY., Disp: 60 tablet, Rfl: 11;  topiramate (TOPAMAX) 25 MG tablet, Take 4 tablets (100 mg total) by mouth 2 (two) times daily., Disp: 240 tablet, Rfl: 5  venlafaxine (EFFEXOR-XR) 150 MG Cp24, TAKE 1 CAPSULE BY MOUTH ONCE DAILY, Disp: 30 capsule, Rfl: 2;  vitamin D (VITAMIN D3) 185 MG Tab, Take 185 mg by mouth once daily., Disp: , Rfl:         Review of Systems   Constitutional: Negative.  Negative for chills and fever.   HENT: Negative for facial swelling and trouble swallowing.    Eyes: Negative for visual disturbance.   Respiratory: Negative for cough, chest tightness, shortness of breath and wheezing.    Cardiovascular: Negative for chest pain and palpitations.   Gastrointestinal: Negative for abdominal pain, constipation, nausea and vomiting.   Genitourinary: Positive for difficulty urinating. Negative for dysuria, hematuria, urgency and vaginal bleeding.   Musculoskeletal: Positive for arthralgias. Negative for gait problem.        Fibromyalgia     Skin: Negative for rash.        Left mastectomy site healing  well     Neurological: Negative for dizziness and headaches.   Hematological: Does not bruise/bleed easily.   Psychiatric/Behavioral: Negative for behavioral problems.       Objective:      Physical Exam   Nursing note and vitals reviewed.  Constitutional: She is oriented to person, place, and time. Vital signs are normal. She appears well-developed and well-nourished. She is active and cooperative.   HENT:   Head: Normocephalic.   Right Ear: External ear normal.   Left Ear: External ear normal.   Nose: Nose normal.   Eyes: Conjunctivae and lids are normal. Right eye exhibits no discharge. Left eye exhibits no discharge. No scleral icterus.   Neck: Trachea normal and normal range of motion. Neck supple. No tracheal deviation present.   Cardiovascular: Normal rate, regular rhythm and intact distal pulses.    Pulmonary/Chest: Effort normal. No respiratory distress.   Abdominal: Soft. Bowel sounds are normal. She exhibits no distension and no mass. There is no tenderness. There is no rebound and no guarding.       Musculoskeletal: Normal range of motion. She exhibits no edema.   Neurological: She is alert and oriented to person, place, and time.   Skin: Skin is warm, dry and intact.         Assessment:       1. Chronic suprapubic catheter    2. Bacteria in urine    3. Bladder spasms    4. Encounter for care or replacement of suprapubic tube    5. S/P mastectomy, left        Plan:         I spent 25 minutes with the patient of which more than half was spent in direct consultation with the patient in regards to our treatment and plan.    Education and recommendations of today's plan of care including home remedies.  Old SPT easily removed.   Stoma prepped with betadine.   New 16fr SPT eaplaced; urine received and sent to lab for culture.  Irrigated the bladder.  Balloon inflated 9cc sterile water.  Tolerated well  I gave skin barrier cream to apply to surrounding tissue.

## 2019-08-14 NOTE — PRE-PROCEDURE INSTRUCTIONS
Patient stated has not had any problems with anesthesia in the past. Will need preop labs. Our  will call to set up this appt.Preop instructions given. Hold asa, asa containing products, aleve, naprosyn, vitamins and supplements one week prior to surgery. Hold advil, ibuprofen, and motrin 2 days prior to surgery.  Shower the night before surgery and the morning of surgery with an antibacterial soap( hibiclens or dial antibacterial soap).  Nothing on the skin once shower. Do not apply any deodorant, lotion, powder, perfume,or aftershave.No makeup or moisturizer. No fingernail polish or jewelry going to surgery. . May have solid foods, gum, and hard candy until 8 hours before surgery/procedure time.  May have clear liquids( water, gatorade, powerade or apple juice) until 2 hours prior to surgery/procedure time.  No red or orange drinks. If in doubt , drink water. Nothing to drink 2 hours before arrival time for surgery/procedure. If you are told to take medication in the morning of surgery, it may be taken with a sip of water. If your doctor tells you something different pertaining to when to stop eating or drinking, follow your doctor's instructions.Call for changes in status or questions. Patient stated she knows where the 1st floor surgery center is located. Verbalizes understanding.     Private car

## 2019-08-14 NOTE — PRE ADMISSION SCREENING
Anesthesia Assessment: Preoperative EQUATION    Planned Procedure: Procedure(s) (LRB):  CYSTOSCOPY (N/A)  INJECTION, BOTULINUM TOXIN, TYPE A 300 UNITS (N/A)  Requested Anesthesia Type:Monitor Anesthesia Care  Surgeon: Ronel Whittington MD  Service: Urology  Known or anticipated Date of Surgery:8/26/2019    Surgeon notes: other urologist note    Electronic QUestionnaire Assessment completed via nurse interview with patient.  NO AQ    Triage considerations:     The patient has no apparent active cardiac condition (No unstable coronary Syndrome such as severe unstable angina or recent [<1 month] myocardial infarction, decompensated CHF, severe valvular   disease or significant arrhythmia)    Previous anesthesia records:GETA and No problems  8/1/2019 MASTECTOMY 23 hour stay (Left Breast) INJECTION, FOR SENTINEL NODE IDENTIFICATION (Left Axilla) BIOPSY, LYMPH NODE, SENTINEL (Left Axilla)   Airway/Jaw/Neck:  Airway Findings: Mouth Opening: Normal Tongue: Normal  General Airway Assessment: Adult  Mallampati: II  Improves to II with phonation.  TM Distance: Normal, at least 6 cm        Airway Placement Date: 08/01/19 Placement Time: 1621 Method of Intubation: Direct laryngoscopy Inserted by: CRNA Airway Device: Endotracheal Tube Mask Ventilation: Easy - oral Intubated: Postinduction Blade: Allen #2 Airway Device Size: 7.0 Style: Cuffed Cuff Inflation: Minimal occlusive pressure Inflation Amount (mL): 5 Placement Verified By: Auscultation;Capnometry;ETT Condensation Grade: Grade I Complicating Factors: Obesity;Oropharyngeal edema or fat;Short neck Findings Post-Intubation: Positive EtCO2;Bilateral breath sounds;Atraumatic/Condition of teeth unchanged Depth of Insertion (cm): 21 Secured at: Lips Complications: None Breath Sounds: Equal Bilateral Insertion attempts (enter comment if more than 2 attempts): 1 Removal Date: 08/01/19 Removal Time: 1857       Last PCP note: 6-12 months ago , within [facility]   Subspecialty  notes: Cardiology: General, Endocrinology, Nephrology, Rheumatology, BREAST SURGERY, UROLOGY, PM&R, & OPTOMETRY    Other important co-morbidities: MIGRAINES, NEUROGENIC BLADDER, HTN, HLD, DM2,MORBID OBESITY, HYDRONEPHROSIS, CKD4, IRON DEFICIENT ANEMIA, HYPOTHYROID, SECONDARY HYPERPARATHYROIDISM,FIBROMYALGIA, OSTEOARTHRITIS, & VIJAYA        Tests already available:  Available tests,  within 1 month , within [facility] .8/2/2019 POCT GLUC, 6/26/2019 LIPID PANEL,6/14/2019 EKG, 5/30/2019 TSH, HGA1C, RENAL FUNCTION PANEL            Instructions given. (See in Nurse's note)    Optimization:  Anesthesia Preop Clinic Assessment  Indicated- Not indicated for this surgery        Plan:    Testing:  Hematology Profile, BMP and calcium, & phosphorus      Patient  has previously scheduled Medical Appointment:8/14/DR. TORREZ    Navigation: Tests Scheduled. TBD                       Results will be tracked by Preop Clinic.                 Stra

## 2019-08-14 NOTE — ANESTHESIA PREPROCEDURE EVALUATION
Anesthesia Assessment: Preoperative EQUATION     Planned Procedure: Procedure(s) (LRB):  CYSTOSCOPY (N/A)  INJECTION, BOTULINUM TOXIN, TYPE A 300 UNITS (N/A)  Requested Anesthesia Type:Monitor Anesthesia Care  Surgeon: Ronel Whittington MD  Service: Urology  Known or anticipated Date of Surgery:8/26/2019     Surgeon notes: other urologist note     Electronic QUestionnaire Assessment completed via nurse interview with patient.  NO AQ     Triage considerations:      The patient has no apparent active cardiac condition (No unstable coronary Syndrome such as severe unstable angina or recent [<1 month] myocardial infarction, decompensated CHF, severe valvular   disease or significant arrhythmia)     Previous anesthesia records:GETA and No problems  8/1/2019 MASTECTOMY 23 hour stay (Left Breast) INJECTION, FOR SENTINEL NODE IDENTIFICATION (Left Axilla) BIOPSY, LYMPH NODE, SENTINEL (Left Axilla)   Airway/Jaw/Neck:  Airway Findings: Mouth Opening: Normal Tongue: Normal  General Airway Assessment: Adult  Mallampati: II  Improves to II with phonation.  TM Distance: Normal, at least 6 cm        Airway Placement Date: 08/01/19 Placement Time: 1621 Method of Intubation: Direct laryngoscopy Inserted by: CRNA Airway Device: Endotracheal Tube Mask Ventilation: Easy - oral Intubated: Postinduction Blade: Allen #2 Airway Device Size: 7.0 Style: Cuffed Cuff Inflation: Minimal occlusive pressure Inflation Amount (mL): 5 Placement Verified By: Auscultation;Capnometry;ETT Condensation Grade: Grade I Complicating Factors: Obesity;Oropharyngeal edema or fat;Short neck Findings Post-Intubation: Positive EtCO2;Bilateral breath sounds;Atraumatic/Condition of teeth unchanged Depth of Insertion (cm): 21 Secured at: Lips Complications: None Breath Sounds: Equal Bilateral Insertion attempts (enter comment if more than 2 attempts): 1 Removal Date: 08/01/19 Removal Time: 1857         Last PCP note: 6-12 months ago , within [facility]    Subspecialty notes: Cardiology: General, Endocrinology, Nephrology, Rheumatology, BREAST SURGERY, UROLOGY, PM&R, & OPTOMETRY     Other important co-morbidities: MIGRAINES, NEUROGENIC BLADDER, HTN, HLD, DM2,MORBID OBESITY, HYDRONEPHROSIS, CKD4, IRON DEFICIENT ANEMIA, HYPOTHYROID, SECONDARY HYPERPARATHYROIDISM,FIBROMYALGIA, OSTEOARTHRITIS, & VIJAYA        Tests already available:  Available tests,  within 1 month , within [facility] .8/2/2019 POCT GLUC, 6/26/2019 LIPID PANEL,6/14/2019 EKG, 5/30/2019 TSH, HGA1C, RENAL FUNCTION PANEL                       Instructions given. (See in Nurse's note)     Optimization:  Anesthesia Preop Clinic Assessment  Indicated- Not indicated for this surgery            Plan:           Testing:  Hematology Profile, BMP and calcium, & phosphorus                      Patient  has previously scheduled Medical Appointment:8/14/DR. TORREZ     Navigation: Tests Scheduled. TBD                               Results will be tracked by Preop Clinic.                      Stra      Electronically signed by Allie Martinez RN at 8/14/2019  1:52 PM       Pre-admit on 8/26/2019          Detailed Report    8/15 Labs resulted and noted.                                                                                                               08/14/2019  Cecile Bowen is a 69 y.o., female.    Anesthesia Evaluation    I have reviewed the Patient Summary Reports.    I have reviewed the Nursing Notes.   I have reviewed the Medications.     Review of Systems  Anesthesia Hx:  No problems with previous Anesthesia  History of prior surgery of interest to airway management or planning: Previous anesthesia: General Denies Family Hx of Anesthesia complications.   Denies Personal Hx of Anesthesia complications.   Social:  Non-Smoker, No Alcohol Use    Hematology/Oncology:  Hematology Normal   Oncology Normal    --: Iron Deficiency Anemia  Oncology Comments: Left mastectomy 8/1/2019     EENT/Dental:  EENT/Dental  Normal Nasal Symptoms: (chronic rhinitis)    Cardiovascular:   Exercise tolerance: good Hypertension  Denies Angina. hyperlipidemia WOLFE  Functional Capacity 2 METS, GROUND LEVEL WALKING WITH WALKER,OR USES SCOOTER  Hypertension , Recent typical clinic B/P of 146/86    Pulmonary:   Shortness of breath Denies Recent URI. Sleep Apnea  Obstructive Sleep Apnea (VIJAYA), CPAP prescribed.   Renal/:   Chronic Renal Disease, CRI Hydronephrosis Devices/Procedures/Surgery, suprapubic catheter. Kidney Function/Disease, Chronic Kidney Disease (CKD) , CKD Stage IV (GFR 15-29) H/O BILATERAL HYDRONEPHROSIS & INCOMPLETE BLADDER EMPTYING, NEUROGENIC BLADDER    Hepatic/GI:  Hepatic/GI Normal  Esophageal / Stomach Disorders Gerd    Musculoskeletal:   Arthritis   Musculoskeletal General/Symptoms: low back pain. Functional capacity is ambulatory with walker. OR USES SCOOTER  Joint Disease:  Arthritis, Osteoarthritis ARTHRITIS ALL OVER PER PATEINT Lumbar Spine Disorders, Lumbar Disc Disease LUMBAR SPONDYLOSIS  Neurological:   Neuromuscular Disease, Headaches Fibromyalgia Dx of Headaches, Migraine Headache Osteoarthritis  Neuromuscular Disease FIBROMYALGIA  Endocrine:   Diabetes, poorly controlled, type 2 Hypothyroidism  Diabetes, Type 2 Diabetes , Complications include Diabetic Nephropathy, Diabetic Neuropathy Typical AM glucose range: 200-300 SINCE MASTECTOMY , most recent HgA1c value was 6.7 on 5/30/2019.  Metabolic Disorders, Morbid Obesity / BMI > 40  Dermatological:  Skin Normal    Psych:   Psychiatric History depression LONG TERM OPIATE USE Depression.          Physical Exam  General:  Well nourished, Morbid Obesity    Airway/Jaw/Neck:  Airway Findings: Mouth Opening: Small, but > 3cm Tongue: Normal  General Airway Assessment: Adult, Possible difficult intubation  Mallampati: III  Improves to III with phonation.  TM Distance: 4 - 6 cm        Eyes/Ears/Nose:  EYES/EARS/NOSE FINDINGS: Normal   Dental:  Dental Findings: Edentulous    Chest/Lungs:  Chest/Lungs Findings: Clear to auscultation, Normal Respiratory Rate     Heart/Vascular:  Heart Findings: Rate: Normal  Rhythm: Regular Rhythm  Sounds: Normal  Heart murmur: negative Vascular Findings: Normal    Abdomen:  Abdomen Findings: Normal    Musculoskeletal:  Musculoskeletal Findings: Normal   Skin:  Skin Findings: Normal    Mental Status:  Mental Status Findings:  Cooperative, Alert and Oriented         Anesthesia Plan  Type of Anesthesia, risks & benefits discussed:  Anesthesia Type:  general  Patient's Preference:   Intra-op Monitoring Plan: standard ASA monitors  Intra-op Monitoring Plan Comments:   Post Op Pain Control Plan:   Post Op Pain Control Plan Comments:   Induction:   IV  Beta Blocker:  Patient is not currently on a Beta-Blocker (No further documentation required).       Informed Consent: Patient understands risks and agrees with Anesthesia plan.  Questions answered. Anesthesia consent signed with patient.  ASA Score: 3     Day of Surgery Review of History & Physical:            Ready For Surgery From Anesthesia Perspective.

## 2019-08-15 ENCOUNTER — PATIENT MESSAGE (OUTPATIENT)
Dept: ANESTHESIOLOGY | Facility: HOSPITAL | Age: 69
End: 2019-08-15

## 2019-08-16 ENCOUNTER — TELEPHONE (OUTPATIENT)
Dept: SURGERY | Facility: CLINIC | Age: 69
End: 2019-08-16

## 2019-08-16 LAB — BACTERIA UR CULT: ABNORMAL

## 2019-08-16 NOTE — TELEPHONE ENCOUNTER
----- Message from Eleonora Pablo sent at 8/16/2019 11:02 AM CDT -----  Contact: pt  Needs Advice    Reason for call: the pt is calling to speak with someone regarding needing the her test results. The pt states she had her surgery 2 weeks ago but has yet to hear her results.         Communication Preference: [phone]     Additional Information:

## 2019-08-16 NOTE — TELEPHONE ENCOUNTER
Return call to pt. Informed that the path report still in process. Pt states that when she saw Dr Fulton on Tuesday 8/13/19, it was to be completed by the next day. Informed pt that I would call the path dept and get an updated status on the report. Pt also asked how long she would have pain under her arm. States that is not as bad as it was right after surgery, but still hurts. Instructed pt to call next week if pain not improving.

## 2019-08-19 ENCOUNTER — TELEPHONE (OUTPATIENT)
Dept: UROLOGY | Facility: CLINIC | Age: 69
End: 2019-08-19

## 2019-08-19 DIAGNOSIS — N39.0 BACTERIAL UTI: Primary | ICD-10-CM

## 2019-08-19 DIAGNOSIS — A49.9 BACTERIAL UTI: Primary | ICD-10-CM

## 2019-08-19 RX ORDER — AMOXICILLIN AND CLAVULANATE POTASSIUM 500; 125 MG/1; MG/1
1 TABLET, FILM COATED ORAL
Qty: 21 TABLET | Refills: 0 | Status: SHIPPED | OUTPATIENT
Start: 2019-08-19 | End: 2019-08-26

## 2019-08-19 NOTE — PROGRESS NOTES
Scheduled for cysto with botox 8/26/2019 with Dr. Whittington    Recent Urine Cx:  PROTEUS MIRABILIS >100,000 cfu/ml with MDR  Sensitive to Augmentin; Cr 1.9 her Cr CL >30  Augmentin 500 mg TID sent to pharmacy

## 2019-08-19 NOTE — TELEPHONE ENCOUNTER
Patient notified that Antibiotics was sent to pharmacy.   Her procedure is next Monday.   
Detail Level: Generalized

## 2019-08-22 ENCOUNTER — OFFICE VISIT (OUTPATIENT)
Dept: SURGERY | Facility: CLINIC | Age: 69
End: 2019-08-22
Payer: MEDICARE

## 2019-08-22 ENCOUNTER — TELEPHONE (OUTPATIENT)
Dept: SURGERY | Facility: CLINIC | Age: 69
End: 2019-08-22

## 2019-08-22 ENCOUNTER — TELEPHONE (OUTPATIENT)
Dept: PHARMACY | Facility: CLINIC | Age: 69
End: 2019-08-22

## 2019-08-22 VITALS
WEIGHT: 293 LBS | TEMPERATURE: 98 F | DIASTOLIC BLOOD PRESSURE: 67 MMHG | SYSTOLIC BLOOD PRESSURE: 138 MMHG | HEIGHT: 68 IN | HEART RATE: 99 BPM | BODY MASS INDEX: 44.41 KG/M2

## 2019-08-22 DIAGNOSIS — R52 PAIN: Primary | ICD-10-CM

## 2019-08-22 PROCEDURE — 99999 PR PBB SHADOW E&M-EST. PATIENT-LVL III: CPT | Mod: PBBFAC,,, | Performed by: PHYSICIAN ASSISTANT

## 2019-08-22 PROCEDURE — 99024 POSTOP FOLLOW-UP VISIT: CPT | Mod: S$PBB,,, | Performed by: PHYSICIAN ASSISTANT

## 2019-08-22 PROCEDURE — 99213 OFFICE O/P EST LOW 20 MIN: CPT | Mod: PBBFAC | Performed by: PHYSICIAN ASSISTANT

## 2019-08-22 PROCEDURE — 99024 PR POST-OP FOLLOW-UP VISIT: ICD-10-PCS | Mod: S$PBB,,, | Performed by: PHYSICIAN ASSISTANT

## 2019-08-22 PROCEDURE — 99999 PR PBB SHADOW E&M-EST. PATIENT-LVL III: ICD-10-PCS | Mod: PBBFAC,,, | Performed by: PHYSICIAN ASSISTANT

## 2019-08-22 NOTE — PROGRESS NOTES
[facility]  [facility]  8/22/2019      SUBJECTIVE:   Ms. Cecile Bowen is a 69 y.o. female who presents today complaining of a LEFT axillary mass.      History of Present Illness: Patient was initially seen by Dr. 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    On 8/1/19 she had a left mastectomy and sentinel lymph node biopsy.  Final pathology revealed clear margins and micrometastasis in 1 out of 2 lymph nodes, stage 1 ([id number]).  The tumor measured 1.2 cm on surgical pathology.    She was seen by Dr. Fulton for her post-op visit on 8/13/19 and at the time appeared to be healing well.      Interval History:  Patient states she noticed a lump at her left axilla on Monday.  She thinks it has increased in size and is causing her pain.  Nothing seems to make it better or worse.    She also states she had a low grade fever of 99 two nights ago.      Past Medical History:   Diagnosis Date    Cervicogenic migraine     Chronic pain     CKD (chronic kidney disease) stage 4, GFR 15-29 ml/min     [facility]    CKD (chronic kidney disease) stage 4, GFR 15-29 ml/min     Depression     Diabetes mellitus     Long term use of Insulin, Diabetic Neuropathy    Fibromyalgia     Hydronephrosis     Hyperlipidemia     Hypertension 12/12/2012    Hypothyroidism 12/12/2012    ARON (iron deficiency anemia)     Insomnia     Levoscoliosis     Malignant neoplasm of upper-outer quadrant of left breast in female, estrogen receptor positive     Metabolic acidosis     Migraine     Mobility impaired     Nuclear sclerosis - Both Eyes 3/24/2014    OA (osteoarthritis of the spine)     Lumbar    Obesity 12/12/2012    VIJAYA (obstructive sleep apnea)     Osteopenia     Pulmonary nodule     Recurrent UTI     Secondary hyperparathyroidism, renal     Urinary retention     Vitamin D deficiency      Past Surgical History:   Procedure  Laterality Date    BIOPSY, LYMPH NODE, SENTINEL Left 8/1/2019    Performed by [facility]ia [facility]ulton MD at [facility] OR 2ND FLR    BLOCK-NERVE GENITOFEMORAL Left 2/20/2018    [facility] at [facility]    BLOCK-NERVE-MEDIAL BRANCH-LUMBAR Bilateral 3/15/2018    [facility] by [facility] at [facility]    BLOCK-NERVE-MEDIAL BRANCH-LUMBAR Bilateral 2/20/2018    Per[facility] by [facility] at [facility]    BREAST BIOPSY Right     benign    CHOLECYSTECTOMY      COLONOSCOPY N/A 1/13/2017    Performed by Morris Wiseman MD at [facility] ENDO (4TH FLR)    CYSTOSCOPY N/A 3/25/2019    Performed by Ronel Whittington MD at [facility] OR 1ST FLR    CYSTOSCOPY N/A 10/8/2018    Performed by Ronel [facility]ittington MD at [facility] OR 1ST FLR    CYSTOSCOPY N/A 5/14/2018    Performed by R[facility]ton MD at [facility] OR 1ST FLR    CYSTOSCOPY N/A 12/14/2015    Performed by Ronel [facility]ton MD at [facility] OR 1ST FLR    CYSTOSCOPY N/A 5/29/2015    Performed by Ronel [facility]ton MD at [facility] OR 2ND FLR    CYSTOSCOPY N/A 3/23/2015    Performed by Ronel Whittington MD at [facility] OR 1ST FLR    DILATION AND CURETTAGE OF UTERUS      EXCHANGE suprapubic catheter N/A 3/25/2019    Performed by Ronel [facility]ton MD at [facility] OR 1ST FLR    FLUORO URODYNAMIC STUDY (FUDS) N/A 3/23/2015    Performed by Ronel Whittington MD at [facility] OR 1ST FLR    GALLBLADDER SURGERY  2006    HYSTERECTOMY      INJECTION, BOTULINUM TOXIN, TYPE A 300 UNITS N/A 3/25/2019    Performed by Ronel [facility]ittington MD at [facility] OR 1ST FLR    INJECTION, BOTULINUM TOXIN, TYPE A 300 UNITS N/A 10/8/2018    Performed by Ronel Whittington MD at [facility] OR 1ST FLR    INJECTION, FOR SENTINEL NODE IDENTIFICATION Left 8/1/2019    Performed by Samia Fulton MD at [facility] OR 2ND FLR    INJECTION-BOTOX N/A 5/14/2018    Performed by Ronel Whittington MD at [facility] OR 1ST FLR    INJECTION-BOTOX N/A 12/14/2015    Performed by Ronel FRAZIER  [facility] at [facility] OR 1ST FLR    INJECTION-BOTOX N/A 5/29/2015    Performed by R[facility]ton MD at [facility] OR 2ND FLR    INSERTION-CATHETER-SUPRAPUBIC CHANGE N/A 5/14/2018    Performed by Ronel [facility]ton MD at [facility] OR 1ST FLR    INSERTION-SUPRAPUBIC TUBE-OPEN N/A 5/29/2015    Performed by Ronel [facility]ton MD at [facility] OR 2ND FLR    MASTECTOMY 23 hour stay Left 8/1/2019    Performed by [facility]ia Fulton MD at [facility] OR 2ND FLR    OVARIAN CYST SURGERY  1985    RADIOFREQUENCY THERMOCOAGULATION (RFTC)-NERVE-MEDIAN BRANCH-LUMBAR Left 4/20/2018    Performed by [facility] MD at [facility] PAIN MGT    RADIOFREQUENCY THERMOCOAGULATION (RFTC)-NERVE-MEDIAN BRANCH-LUMBAR Right 4/2/2018    Performed by April Torres MD at [facility] PAIN MGT    spt placement      TONSILLECTOMY, ADENOIDECTOMY      TOTAL ABDOMINAL HYSTERECTOMY W/ BILATERAL SALPINGOOPHORECTOMY  1985     Social History     Socioeconomic History    Marital status:      Spouse name: Not on file    Number of children: Not on file    Years of education: Not on file    Highest education level: Not on file   Occupational History    Occupation: teacher     Comment: retired   Social Needs    Financial resource strain: Not on file    Food insecurity:     Worry: Not on file     Inability: Not on file    Transportation needs:     Medical: Not on file     Non-medical: Not on file   Tobacco Use    Smoking status: Never Smoker    Smokeless tobacco: Never Used   Substance and Sexual Activity    Alcohol use: No     Alcohol/week: 0.0 oz    Drug use: No    Sexual activity: Not Currently     Birth control/protection: Abstinence   Lifestyle    Physical activity:     Days per week: Not on file     Minutes per session: Not on file    Stress: Not on file   Relationships    Social connections:     Talks on phone: Not on file     Gets together: Not on file     Attends Jewish service: Not on file     Active member of club or organization: Not  on file     Attends meetings of clubs or organizations: Not on file     Relationship status: Not on file   Other Topics Concern    Not on file   Social History Narrative    Single ()        Lives w/ sister and brother. Pt needs to keep her narcotics hidden from her brother who will steal them          Review of patient's allergies indicates:  No Known Allergies     Review of Systems: Denies any chest pain or shortness of breath. See HPI/ Interval History for other systems reviewed.    OBJECTIVE:   Vitals:    08/22/19 1042   BP: 138/67   Pulse: 99   Temp: 98.3 °F (36.8 °C)     Physical Exam:  HEENT: Normocephalic, atraumatic.    General: alert and oriented; no acute distress.    Left breast/axilla: ~6 x 7 cm area of fatty tissue just inferior and lateral to left axilla.  No erythema or skin changes.    A thorough ultrasound of this area was performed in clinic and no underlying fluid collection was seen.    Incision at left breast appears clean, dry, and intact.      ASSESSMENT:  Ms. Cecile Bowen is a 69 y.o. year old female with IDC and DCIS of the LEFT breast, s/p left mastectomy and SLNB on 8/1/19, stage 1 ([id number]), presents today complaining of left axillary swelling.      PLAN:   We discussed that this was just fatty tissue, the same tissue present on the contralateral side.  Her mastectomy likely caused an increase in sensation of this tissue and it she might have some residual edema from the surgery.  There was no underlying fluid collection on ultrasound and no skin changes or signs of infection.  We also dicussed her final pathology with negative margins and micrometastasis in 1 out of 2 lymph nodes.    She also states she never got an appointment with radiation or medical oncology.  I will notify Dr. Fulton, and she can follow up with me as needed.    ~Mohini Betancur PA-C      Surgical Oncology            8/22/2019

## 2019-08-22 NOTE — TELEPHONE ENCOUNTER
Contacted the patient regarding the message below.  The patient stated that her left axilla has a painful lump the size of a grapefruit.  The patient is scheduled to see DENISHA Betancur today at 10:30 to evaluate this area.  The patient voiced understanding of appointment date, time, and location.

## 2019-08-22 NOTE — TELEPHONE ENCOUNTER
----- Message from Charisse Munguia sent at 8/22/2019  8:15 AM CDT -----  Contact: Pt.Self   Needs Advice    Reason for call:  Pt. States she would like to speak with nurse  in reference to developing large lump   on (left) breast under armpit size of grapefruit along with severe pain which has grown over the  last couple of days is in fear this could turn into lymphedema  pt. states she Also had 99.5 low grade fever about 4 days ago and needs to find out what can done          Communication Preference:  [phone]    Additional Information:    Thank You

## 2019-08-23 ENCOUNTER — TELEPHONE (OUTPATIENT)
Dept: UROLOGY | Facility: CLINIC | Age: 69
End: 2019-08-23

## 2019-08-25 ENCOUNTER — PATIENT MESSAGE (OUTPATIENT)
Dept: PHYSICAL MEDICINE AND REHAB | Facility: CLINIC | Age: 69
End: 2019-08-25

## 2019-08-26 ENCOUNTER — TELEPHONE (OUTPATIENT)
Dept: PHYSICAL MEDICINE AND REHAB | Facility: CLINIC | Age: 69
End: 2019-08-26

## 2019-08-26 ENCOUNTER — HOSPITAL ENCOUNTER (OUTPATIENT)
Facility: HOSPITAL | Age: 69
Discharge: HOME | End: 2019-08-26
Attending: UROLOGY | Admitting: UROLOGY
Payer: MEDICARE

## 2019-08-26 ENCOUNTER — ANESTHESIA (OUTPATIENT)
Dept: SURGERY | Facility: HOSPITAL | Age: 69
End: 2019-08-26
Payer: MEDICARE

## 2019-08-26 ENCOUNTER — TELEPHONE (OUTPATIENT)
Dept: UROLOGY | Facility: CLINIC | Age: 69
End: 2019-08-26

## 2019-08-26 DIAGNOSIS — M54.50 CHRONIC MIDLINE LOW BACK PAIN WITHOUT SCIATICA: ICD-10-CM

## 2019-08-26 DIAGNOSIS — G89.29 CHRONIC MIDLINE LOW BACK PAIN WITHOUT SCIATICA: ICD-10-CM

## 2019-08-26 DIAGNOSIS — G89.29 CHRONIC NECK PAIN: ICD-10-CM

## 2019-08-26 DIAGNOSIS — M79.7 FIBROMYALGIA: ICD-10-CM

## 2019-08-26 DIAGNOSIS — M54.2 CHRONIC NECK PAIN: ICD-10-CM

## 2019-08-26 DIAGNOSIS — N31.9 NEUROGENIC BLADDER: Primary | ICD-10-CM

## 2019-08-26 LAB
POCT GLUCOSE: 227 MG/DL (ref 70–110)
POCT GLUCOSE: 236 MG/DL (ref 70–110)

## 2019-08-26 PROCEDURE — 63600175 PHARM REV CODE 636 W HCPCS: Performed by: ANESTHESIOLOGY

## 2019-08-26 PROCEDURE — 71000044 HC DOSC ROUTINE RECOVERY FIRST HOUR: Performed by: UROLOGY

## 2019-08-26 PROCEDURE — 63600175 PHARM REV CODE 636 W HCPCS: Performed by: NURSE ANESTHETIST, CERTIFIED REGISTERED

## 2019-08-26 PROCEDURE — D9220A PRA ANESTHESIA: Mod: CRNA,,, | Performed by: NURSE ANESTHETIST, CERTIFIED REGISTERED

## 2019-08-26 PROCEDURE — 37000008 HC ANESTHESIA 1ST 15 MINUTES: Performed by: UROLOGY

## 2019-08-26 PROCEDURE — 63600175 PHARM REV CODE 636 W HCPCS: Performed by: STUDENT IN AN ORGANIZED HEALTH CARE EDUCATION/TRAINING PROGRAM

## 2019-08-26 PROCEDURE — 37000009 HC ANESTHESIA EA ADD 15 MINS: Performed by: UROLOGY

## 2019-08-26 PROCEDURE — D9220A PRA ANESTHESIA: ICD-10-PCS | Mod: CRNA,,, | Performed by: NURSE ANESTHETIST, CERTIFIED REGISTERED

## 2019-08-26 PROCEDURE — 36000706: Performed by: UROLOGY

## 2019-08-26 PROCEDURE — 36000707: Performed by: UROLOGY

## 2019-08-26 PROCEDURE — 63600175 PHARM REV CODE 636 W HCPCS

## 2019-08-26 PROCEDURE — 25000003 PHARM REV CODE 250: Performed by: STUDENT IN AN ORGANIZED HEALTH CARE EDUCATION/TRAINING PROGRAM

## 2019-08-26 PROCEDURE — 25000003 PHARM REV CODE 250: Performed by: UROLOGY

## 2019-08-26 PROCEDURE — 52287 PR CYSTOURETHROSCOPY WITH INJ FOR CHEMODENERVATION: ICD-10-PCS | Mod: ,,, | Performed by: UROLOGY

## 2019-08-26 PROCEDURE — 52287 CYSTOSCOPY CHEMODENERVATION: CPT | Mod: ,,, | Performed by: UROLOGY

## 2019-08-26 PROCEDURE — 82962 GLUCOSE BLOOD TEST: CPT | Performed by: UROLOGY

## 2019-08-26 PROCEDURE — D9220A PRA ANESTHESIA: ICD-10-PCS | Mod: ANES,,, | Performed by: ANESTHESIOLOGY

## 2019-08-26 PROCEDURE — D9220A PRA ANESTHESIA: Mod: ANES,,, | Performed by: ANESTHESIOLOGY

## 2019-08-26 PROCEDURE — 71000045 HC DOSC ROUTINE RECOVERY EA ADD'L HR: Performed by: UROLOGY

## 2019-08-26 PROCEDURE — 25000003 PHARM REV CODE 250: Performed by: ANESTHESIOLOGY

## 2019-08-26 PROCEDURE — 63600175 PHARM REV CODE 636 W HCPCS: Mod: JG | Performed by: UROLOGY

## 2019-08-26 PROCEDURE — 25000003 PHARM REV CODE 250: Performed by: NURSE ANESTHETIST, CERTIFIED REGISTERED

## 2019-08-26 RX ORDER — LIDOCAINE HCL/PF 100 MG/5ML
SYRINGE (ML) INTRAVENOUS
Status: DISCONTINUED | OUTPATIENT
Start: 2019-08-26 | End: 2019-08-26

## 2019-08-26 RX ORDER — BUTALBITAL, ACETAMINOPHEN AND CAFFEINE 50; 325; 40 MG/1; MG/1; MG/1
2 TABLET ORAL ONCE
Status: COMPLETED | OUTPATIENT
Start: 2019-08-26 | End: 2019-08-26

## 2019-08-26 RX ORDER — METOPROLOL TARTRATE 1 MG/ML
INJECTION, SOLUTION INTRAVENOUS
Status: DISCONTINUED
Start: 2019-08-26 | End: 2019-08-26 | Stop reason: HOSPADM

## 2019-08-26 RX ORDER — OXYCODONE HYDROCHLORIDE 5 MG/1
5 TABLET ORAL EVERY 4 HOURS PRN
Qty: 5 TABLET | Refills: 0 | Status: SHIPPED | OUTPATIENT
Start: 2019-08-26 | End: 2020-01-24

## 2019-08-26 RX ORDER — ONDANSETRON 2 MG/ML
INJECTION INTRAMUSCULAR; INTRAVENOUS
Status: DISCONTINUED | OUTPATIENT
Start: 2019-08-26 | End: 2019-08-26

## 2019-08-26 RX ORDER — SODIUM CHLORIDE 9 MG/ML
INJECTION, SOLUTION INTRAVENOUS CONTINUOUS
Status: DISCONTINUED | OUTPATIENT
Start: 2019-08-26 | End: 2019-08-26 | Stop reason: HOSPADM

## 2019-08-26 RX ORDER — FENTANYL CITRATE 50 UG/ML
INJECTION, SOLUTION INTRAMUSCULAR; INTRAVENOUS
Status: COMPLETED
Start: 2019-08-26 | End: 2019-08-26

## 2019-08-26 RX ORDER — HYDRALAZINE HYDROCHLORIDE 20 MG/ML
INJECTION INTRAMUSCULAR; INTRAVENOUS
Status: DISCONTINUED
Start: 2019-08-26 | End: 2019-08-26 | Stop reason: HOSPADM

## 2019-08-26 RX ORDER — METOPROLOL TARTRATE 1 MG/ML
5 INJECTION, SOLUTION INTRAVENOUS ONCE
Status: COMPLETED | OUTPATIENT
Start: 2019-08-26 | End: 2019-08-26

## 2019-08-26 RX ORDER — FENTANYL CITRATE 50 UG/ML
25 INJECTION, SOLUTION INTRAMUSCULAR; INTRAVENOUS EVERY 5 MIN PRN
Status: COMPLETED | OUTPATIENT
Start: 2019-08-26 | End: 2019-08-26

## 2019-08-26 RX ORDER — HYDRALAZINE HYDROCHLORIDE 20 MG/ML
5 INJECTION INTRAMUSCULAR; INTRAVENOUS ONCE
Status: COMPLETED | OUTPATIENT
Start: 2019-08-26 | End: 2019-08-26

## 2019-08-26 RX ORDER — KETAMINE HCL IN 0.9 % NACL 50 MG/5 ML
SYRINGE (ML) INTRAVENOUS
Status: DISCONTINUED | OUTPATIENT
Start: 2019-08-26 | End: 2019-08-26

## 2019-08-26 RX ORDER — CEFAZOLIN SODIUM 1 G/3ML
2 INJECTION, POWDER, FOR SOLUTION INTRAMUSCULAR; INTRAVENOUS
Status: COMPLETED | OUTPATIENT
Start: 2019-08-26 | End: 2019-08-26

## 2019-08-26 RX ORDER — PROPOFOL 10 MG/ML
VIAL (ML) INTRAVENOUS CONTINUOUS PRN
Status: DISCONTINUED | OUTPATIENT
Start: 2019-08-26 | End: 2019-08-26

## 2019-08-26 RX ORDER — PROPOFOL 10 MG/ML
VIAL (ML) INTRAVENOUS
Status: DISCONTINUED | OUTPATIENT
Start: 2019-08-26 | End: 2019-08-26

## 2019-08-26 RX ORDER — LIDOCAINE HYDROCHLORIDE 20 MG/ML
JELLY TOPICAL
Status: DISCONTINUED | OUTPATIENT
Start: 2019-08-26 | End: 2019-08-26 | Stop reason: HOSPADM

## 2019-08-26 RX ORDER — MIDAZOLAM HYDROCHLORIDE 1 MG/ML
INJECTION, SOLUTION INTRAMUSCULAR; INTRAVENOUS
Status: DISCONTINUED | OUTPATIENT
Start: 2019-08-26 | End: 2019-08-26

## 2019-08-26 RX ORDER — HYDROCODONE BITARTRATE AND ACETAMINOPHEN 10; 325 MG/1; MG/1
1 TABLET ORAL EVERY 6 HOURS PRN
Qty: 120 TABLET | Refills: 0 | Status: SHIPPED | OUTPATIENT
Start: 2019-08-27 | End: 2019-09-27 | Stop reason: SDUPTHER

## 2019-08-26 RX ORDER — LIDOCAINE HYDROCHLORIDE 10 MG/ML
1 INJECTION, SOLUTION EPIDURAL; INFILTRATION; INTRACAUDAL; PERINEURAL ONCE
Status: COMPLETED | OUTPATIENT
Start: 2019-08-26 | End: 2019-08-26

## 2019-08-26 RX ADMIN — FENTANYL CITRATE 25 MCG: 50 INJECTION INTRAMUSCULAR; INTRAVENOUS at 10:08

## 2019-08-26 RX ADMIN — LIDOCAINE HYDROCHLORIDE 10 MG: 10 INJECTION, SOLUTION EPIDURAL; INFILTRATION; INTRACAUDAL; PERINEURAL at 08:08

## 2019-08-26 RX ADMIN — METOPROLOL TARTRATE 5 MG: 1 INJECTION, SOLUTION INTRAVENOUS at 01:08

## 2019-08-26 RX ADMIN — CEFAZOLIN 3 G: 330 INJECTION, POWDER, FOR SOLUTION INTRAMUSCULAR; INTRAVENOUS at 09:08

## 2019-08-26 RX ADMIN — HYDRALAZINE HYDROCHLORIDE 5 MG: 20 INJECTION INTRAMUSCULAR; INTRAVENOUS at 12:08

## 2019-08-26 RX ADMIN — Medication 20 MG: at 09:08

## 2019-08-26 RX ADMIN — ONDANSETRON 4 MG: 2 INJECTION INTRAMUSCULAR; INTRAVENOUS at 09:08

## 2019-08-26 RX ADMIN — HYDRALAZINE HYDROCHLORIDE 5 MG: 20 INJECTION INTRAMUSCULAR; INTRAVENOUS at 11:08

## 2019-08-26 RX ADMIN — INSULIN HUMAN 6 UNITS: 100 INJECTION, SOLUTION PARENTERAL at 09:08

## 2019-08-26 RX ADMIN — MIDAZOLAM HYDROCHLORIDE 2 MG: 1 INJECTION, SOLUTION INTRAMUSCULAR; INTRAVENOUS at 09:08

## 2019-08-26 RX ADMIN — PROPOFOL 50 MG: 10 INJECTION, EMULSION INTRAVENOUS at 09:08

## 2019-08-26 RX ADMIN — SODIUM CHLORIDE: 0.9 INJECTION, SOLUTION INTRAVENOUS at 09:08

## 2019-08-26 RX ADMIN — BUTALBITAL, ACETAMINOPHEN AND CAFFEINE 2 TABLET: 50; 325; 40 TABLET ORAL at 02:08

## 2019-08-26 RX ADMIN — PROPOFOL 100 MCG/KG/MIN: 10 INJECTION, EMULSION INTRAVENOUS at 09:08

## 2019-08-26 RX ADMIN — LIDOCAINE HYDROCHLORIDE 100 MG: 20 INJECTION, SOLUTION INTRAVENOUS at 09:08

## 2019-08-26 NOTE — DISCHARGE INSTRUCTIONS
Cystoscopy    Cystoscopy is a procedure that lets your doctor look directly inside your urethra and bladder. It can be used to:  · Help diagnose a problem with your urethra, bladder, or kidneys.  · Take a sample (biopsy) of bladder or urethral tissue.  · Treat certain problems (such as removing kidney stones).  · Place a stent to bypass an obstruction.  · Take special X-rays of the kidneys.  Based on the findings, your doctor may recommend other tests or treatments.  What is a cystoscope?  A cystoscope is a telescope-like instrument that contains lenses and fiberoptics (small glass wires that make bright light). The cystoscope may be straight and rigid, or flexible to bend around curves in the urethra. The doctor may look directly into the cystoscope, or project the image onto a monitor.  Getting ready  · Ask your doctor if you should stop taking any medicines before the procedure.  · Ask whether you should avoid eating or drinking anything after midnight before the procedure.  · Follow any other instructions your doctor gives you.  Tell your doctor before the exam if you:  · Take any medicines, such as aspirin or blood thinners  · Have allergies to any medicines  · Are pregnant   The procedure  Cystoscopy is done in the doctors office, surgery center, or hospital. The doctor and a nurse are present during the procedure. It takes only a few minutes, longer if a biopsy, X-ray, or treatment needs to be done.  During the procedure:  · You lie on an exam table on your back, knees bent and legs apart. You are covered with a drape.  · Your urethra and the area around it are washed. Anesthetic jelly may be applied to numb the urethra. Other pain medicine is usually not needed. In some cases, you may be offered a mild sedative to help you relax. If a more extensive procedure is to be done, such as a biopsy or kidney stone removal, general anesthesia may be needed.  · The cystoscope is inserted. A sterile fluid is put  into the bladder to expand it. You may feel pressure from this fluid.  · When the procedure is done, the cystoscope is removed.  After the procedure  If you had a sedative, general anesthesia, or spinal anesthesia, you must have someone drive you home. Once youre home:  · Drink plenty of fluids.  · You may have burning or light bleeding when you urinate--this is normal.  · Medicines may be prescribed to ease any discomfort or prevent infection. Take these as directed.  · Call your doctor if you have heavy bleeding or blood clots, burning that lasts more than a day, a fever over 100°F  (38° C), or trouble urinating.  Date Last Reviewed: 1/1/2017  © 5866-3335 [employer]. [address] [place] [address], [place] [address]. All rights reserved. This information is not intended as a substitute for professional medical care. Always follow your healthcare professional's instructions.

## 2019-08-26 NOTE — PROGRESS NOTES
Sat pt up and let her get dressed, rechecked pts BP and informed Dr. Gutierrez. Pt feels ok to go home, stated ok for pt to go home at this time. Pt and sister feel comfortable going home at this time.

## 2019-08-26 NOTE — OP NOTE
[facility]  Operative Note    Date: 08/26/2019    Pre-Op Diagnosis: neurogenic bladder    Patient Active Problem List    Diagnosis Date Noted    Malignant neoplasm of left breast, estrogen receptor positive 08/01/2019    At risk for lymphedema 07/24/2019    Malignant neoplasm of upper-outer quadrant of left breast in female, estrogen receptor positive 07/19/2019    WOLFE (dyspnea on exertion) 06/14/2019    Cervicogenic migraine 02/01/2019    CKD (chronic kidney disease) stage 4, GFR 15-29 ml/min 01/20/2019    Hydronephrosis 05/14/2018    Chronic pain 03/15/2018    Lumbar spondylosis 02/20/2018    Long term prescription opiate use 01/26/2017    Medication overuse headache 01/20/2017    Chronic daily headache 01/20/2017    Special screening for malignant neoplasms, colon 01/13/2017    Osteopenia 01/09/2017    Morbid obesity due to excess calories 01/09/2017    Pulmonary nodule 11/10/2016    Uncontrolled type 2 diabetes mellitus with diabetic neuropathy, without long-term current use of insulin 09/19/2016    Chronic suprapubic catheter 09/19/2016    Uncontrolled type 2 diabetes mellitus with chronic kidney disease, with long-term current use of insulin 07/06/2016    Secondary hyperparathyroidism, renal 03/22/2016    Major depressive disorder, recurrent episode, moderate 03/10/2016    Insomnia 03/10/2016    Mixed migraine and muscle contraction headache 03/10/2016    Neurogenic bladder 12/14/2015    Metabolic acidosis 12/07/2015    Urinary retention 11/10/2015    Encounter for care or replacement of suprapubic tube 10/06/2015    Primary osteoarthritis involving multiple joints 09/15/2015    Iron deficiency anemia 03/16/2015    Enlarged lymph node 03/12/2015    Anemia 03/12/2015    Recurrent UTI 02/24/2015    Osteoarthritis of lumbar spine 02/20/2015    Abnormality of gait and mobility 10/29/2014    Mobility impaired 10/29/2014    Falls frequently 09/30/2014     Dizziness and giddiness 09/22/2014    Nuclear sclerosis - Both Eyes 03/24/2014    VIJAYA (obstructive sleep apnea) 03/17/2014    Vitamin D deficiency disease 03/11/2014    Hyperlipidemia 03/11/2014    Intractable chronic migraine without aura 08/27/2013    Chronic rhinitis 03/23/2013    Fibromyalgia 12/17/2012    Hypertension 12/12/2012    Hypothyroidism 12/12/2012       Post-Op Diagnosis: same    Procedure(s) Performed:   1.  Cystoscopy with bladder botox injection    Specimen(s): none    Staff Surgeon:  Ronel Whittington MD    Assistant Surgeon: Manan Dixon MD    Anesthesia: Monitored Local Anesthesia with Sedation    Indications: Cecile Bowen is a 67 y.o. female with neurogenic bladder, bilateral Gr III-IV reflux, hx bilateral hydroneprhosis, managed with SPT and botox injection. Last injection 10/8/2018    Findings:   Normal cystoscopy, small area of catheter irritation around SP tube tract   300u in 21 cc injected into bladder detrusor    Estimated Blood Loss: min    Drains: none    Procedure in Detail:  After informed consent was obtained the patient was brought to the cystoscopy suite and placed in the supine position.  SCDs were applied and working.  Anesthesia was administered.  When the patient was adequately sedated she was placed in the dorsal lithotomy position and prepped and draped in the usual sterile fashion.    A rigid cystoscope in a 22 Fr sheath was introduced into the patients's bladder via the urethra.  This passed easily.  Formal cystoscopy was performed which revealed the ureteral orifices in their normal anatomic position bilaterally.  No bladder masses, trabeculations, stones or diverticuli were seen. Catheter irritation was noted in the dome of the bladder near the SP tube balloon.    300U units of botox in 21cc was injected into the detrusor muscle throughout the bladder.  Good wheals were raised.  The patient's bladder was drained and the cystoscope was removed.     The  patient tolerated the procedure well and was transferred to the recovery room in stable condition.      Disposition:  The patient will follow up with in 3 weeks for SP tube exchange.    Manan Dixon MD

## 2019-08-26 NOTE — DISCHARGE SUMMARY
[facility]  Discharge Note  Short Stay    Admit Date: 8/26/2019    Discharge Date and Time: 08/26/2019 9:52 AM      Attending Physician: Ronel Whittington MD     Discharge Provider: Manan Dixon MD    Diagnoses:  Active Hospital Problems    Diagnosis  POA    *Neurogenic bladder [N31.9]  Yes      Resolved Hospital Problems   No resolved problems to display.       Discharged Condition: stable    Hospital Course: Patient was admitted for cysto botox and tolerated the procedure well with no complications. The patient was discharged home in good condition on the same day.       Final Diagnoses: Same as principal problem.    Disposition: Home or Self Care    Follow up/Patient Instructions:    Medications:  Reconciled Home Medications:   Current Discharge Medication List      CONTINUE these medications which have CHANGED    Details   oxyCODONE (ROXICODONE) 5 MG immediate release tablet Take 1 tablet (5 mg total) by mouth every 4 (four) hours as needed for Pain.  Qty: 5 tablet, Refills: 0         CONTINUE these medications which have NOT CHANGED    Details   amoxicillin-clavulanate 500-125mg (AUGMENTIN) 500-125 mg Tab Take 1 tablet (500 mg total) by mouth 3 (three) times daily with meals. for 7 days  Qty: 21 tablet, Refills: 0    Associated Diagnoses: Bacterial UTI      butalbital-acetaminophen-caffeine -40 mg (FIORICET, ESGIC) -40 mg per tablet TAKE 1 TABLET BY MOUTH WHEN NOT CONTROLLED BY OTHER MEDS. NO MORE THAN 10 TABS PER 30 DAYS  Qty: 10 tablet, Refills: 0      cyanocobalamin, vitamin B-12, (VITAMIN B-12) 5,000 mcg Subl Place 1 tablet under the tongue every morning.       erenumab-aooe 140 mg/mL AtIn Inject 1 syringe (140 mg total) into the skin every 28 days.  Qty: 1 mL, Refills: 11      ergocalciferol (VITAMIN D2) 50,000 unit Cap TAKE ONE CAPSULE BY MOUTH ONE TIME PER WEEK  Qty: 12 capsule, Refills: 3      ferrous sulfate 325 (65 FE) MG EC tablet Take 325 mg by mouth nightly.        gemfibrozil (LOPID) 600 MG tablet TAKE 1 TABLET BY MOUTH EVERY OTHER DAY  Qty: 60 tablet, Refills: 2      HYDROcodone-acetaminophen (NORCO)  mg per tablet Take 1 tablet by mouth every 6 (six) hours as needed.  Qty: 120 tablet, Refills: 0    Associated Diagnoses: Chronic midline low back pain without sciatica; Chronic neck pain; Fibromyalgia      insulin (LANTUS SOLOSTAR U-100 INSULIN) glargine 100 units/mL (3mL) SubQ pen Inject 24 units daily.  Qty: 15 mL, Refills: 6      insulin lispro (HUMALOG U-100 INSULIN) 100 unit/mL injection Inject 5-7 units w/ meals plus scale 180-230+2, 231-280+4, 281-330+6, 331-380+8, >380 +10.  Qty: 20 mL, Refills: 6      magnesium oxide (MAG-OX) 400 mg (241.3 mg magnesium) tablet TAKE 1 TABLET (400 MG TOTAL) BY MOUTH 2 (TWO) TIMES DAILY.  Qty: 180 tablet, Refills: 1      methocarbamol (ROBAXIN) 750 MG Tab Take 1-2 tablets (750-1,500 mg total) by mouth 3 (three) times daily as needed.  Qty: 180 tablet, Refills: 3    Comments: DX Code Needed  .  Associated Diagnoses: Chronic midline low back pain without sciatica; Chronic neck pain; Fibromyalgia      multivitamin with minerals tablet Take 1 tablet by mouth every morning.       oxybutynin (DITROPAN-XL) 10 MG 24 hr tablet TAKE 1 TABLET (10 MG TOTAL) BY MOUTH ONCE DAILY.  Qty: 30 tablet, Refills: 9    Associated Diagnoses: Urinary urgency      riboflavin, vitamin B2, 400 mg Tab Take 400 mg by mouth once daily.  Qty: 90 tablet, Refills: 3    Associated Diagnoses: Intractable chronic migraine without aura and with status migrainosus      rosuvastatin (CRESTOR) 40 MG Tab Take 40 mg by mouth nightly.       sodium bicarbonate 650 MG tablet TAKE 1 TABLET (650 MG TOTAL) BY MOUTH 3 (THREE) TIMES DAILY.  Qty: 270 tablet, Refills: 2      sumatriptan (IMITREX) 100 MG tablet TAKE 1 TABLET (100 MG TOTAL) BY MOUTH 2 (TWO) TIMES DAILY AS NEEDED FOR MIGRAINE.  Qty: 9 tablet, Refills: 6    Associated Diagnoses: Intractable chronic migraine without  "aura and with status migrainosus      SYNTHROID 50 mcg tablet TAKE 1 TABLET (50 MCG TOTAL) BY MOUTH ONCE DAILY.  Qty: 30 tablet, Refills: 6      topiramate (TOPAMAX) 200 MG Tab TAKE 1 TABLET BY MOUTH TWICE A DAY  Qty: 180 tablet, Refills: 2    Associated Diagnoses: Intractable chronic migraine without aura and with status migrainosus      traZODone (DESYREL) 100 MG tablet TAKE 1 TABLET BY MOUTH AT BEDTIME MAY TAKE AN EXTRA HALF TABLET IF NEEDED  Qty: 45 tablet, Refills: 12      venlafaxine (EFFEXOR-XR) 75 MG 24 hr capsule Take 2 capsules (150 mg total) by mouth once daily.  Qty: 180 capsule, Refills: 1      BD INSULIN SYRINGE ULTRA-FINE 0.5 mL 31 gauge x 5/16" Syrg USE WITH INSULIN 4 TIMES A DAY  Qty: 100 each, Refills: 12      blood sugar diagnostic Strp ONE TOUCH ULTRA TEST STRIPS//Check blood sugars QID  Qty: 200 strip, Refills: 6      blood-glucose meter kit ONE TOUCH ULTRA  Qty: 1 each, Refills: 0      cloNIDine (CATAPRES) 0.1 MG tablet Take 1 tablet (0.1 mg total) by mouth once. for 1 dose  Qty: 30 tablet, Refills: 3      lancets Misc Ultra 2 lancets to check blood sugar BID  Qty: 100 each, Refills: 6      pen needle, diabetic (BD ULTRA-FINE SHORT PEN NEEDLE) 31 gauge x 5/16" Ndle Uses w/ lantus daily. 90 day  Qty: 100 each, Refills: 3    Associated Diagnoses: Type 2 diabetes mellitus not at goal      scopolamine (TRANSDERM-SCOP) 1.3-1.5 mg (1 mg over 3 days) Place 1 patch onto the skin every 72 hours.  Qty: 4 patch, Refills: 0           Discharge Procedure Orders   Call MD for:  temperature >100.4     Call MD for:  persistent nausea and vomiting or diarrhea     Call MD for:  severe uncontrolled pain     Call MD for:  redness, tenderness, or signs of infection (pain, swelling, redness, odor or green/yellow discharge around incision site)     Call MD for:  difficulty breathing or increased cough     Call MD for:  severe persistent headache     Call MD for:  worsening rash     Call MD for:  persistent dizziness, " light-headedness, or visual disturbances     Call MD for:  increased confusion or weakness     Follow-up Information     Nithin Velazquez DNP On 9/11/2019.    Specialty:  Urology  Why:  For suprapubic catheter exchange  Contact information:  [address]  [facility] [address]  [phone]

## 2019-08-26 NOTE — ANESTHESIA RELEASE NOTE
"Anesthesia Release from PACU Note    Patient: Cecile Bowen    Procedure(s) Performed: Procedure(s) (LRB):  CYSTOSCOPY (N/A)  INJECTION, BOTULINUM TOXIN, TYPE A 300 UNITS (N/A)    Anesthesia type: general    Post pain: Adequate analgesia    Post assessment: no apparent anesthetic complications, tolerated procedure well and no evidence of recall    Last Vitals:   Visit Vitals  BP (!) 127/59   Pulse 82   Temp 37 °C (98.6 °F) (Skin)   Resp 16   Ht 5' 8" (1.727 m)   Wt 136.1 kg (300 lb)   SpO2 96%   Breastfeeding? No   BMI 45.61 kg/m²       Post vital signs: stable    Level of consciousness: awake, alert  and oriented    Nausea/Vomiting: no nausea/no vomiting    Complications: none    Airway Patency: patent    Respiratory: unassisted, spontaneous ventilation, room air    Cardiovascular: stable and blood pressure at baseline    Hydration: euvolemic  "

## 2019-08-26 NOTE — PROGRESS NOTES
Infomred Dr. Gutierrez pts bp still high and she is having chronic back pain, he stated ok for her to take her home robaxin and new loperessor order noted.

## 2019-08-26 NOTE — TELEPHONE ENCOUNTER
I called the patient.   She had left mastectomy due to a tumor on 08/01/2019.  She continues to complain of left chest wall and axillary pain.  She has been on hydrocodone/APAP 10/325 p.r.n. 4 times per day.  Her last refill was on 07/31/2019 (per [place] Prescription Monitoring program ()).  She only has couple tablets left.  I sent a refill for tomorrow.

## 2019-08-26 NOTE — TELEPHONE ENCOUNTER
----- Message from iTta Isaac sent at 8/26/2019  3:34 PM CDT -----  Contact: pt   Type:  Needs Medical Advice    Who Called: pt   Symptoms (please be specific): pt needs a supplemental supply of pain medication for the pt   How long has patient had these symptoms: pt had a mastectomy the first week of August pt said Dr Stafford knows this that the oncology has spoke with him pt is down to her last two pain pills pt needs a supplement to tie her over until its time for her refill pt sent an email thru [facility] last night   Pharmacy name and phone [facility] pharmacy phone number [phone]  Would the patient rather a call back or a response via [facility]? Call back   Best Call Back Number: [phone]  Additional Information: none    BALTAZAR

## 2019-08-26 NOTE — TELEPHONE ENCOUNTER
Cysto, botox 300 units in 4-6 months. Possible cysto, bladder biopsy and fulgration.  Urine culture prior at time of SPT tube 7-10 days prior.

## 2019-08-26 NOTE — TRANSFER OF CARE
"Anesthesia Transfer of Care Note    Patient: Cecile Bowen    Procedure(s) Performed: Procedure(s) (LRB):  CYSTOSCOPY (N/A)  INJECTION, BOTULINUM TOXIN, TYPE A 300 UNITS (N/A)    Patient location: PACU    Anesthesia Type: general    Transport from OR: Transported from OR on 6-10 L/min O2 by face mask with adequate spontaneous ventilation    Post pain: adequate analgesia    Post assessment: no apparent anesthetic complications and tolerated procedure well    Post vital signs: stable    Level of consciousness: awake, alert and oriented    Nausea/Vomiting: no nausea/vomiting    Complications: none    Transfer of care protocol was followed      Last vitals:   Visit Vitals  BP (!) 176/83   Pulse 76   Temp 36.5 °C (97.7 °F) (Skin)   Resp 16   Ht 5' 8" (1.727 m)   Wt 136.1 kg (300 lb)   SpO2 100%   Breastfeeding? No   BMI 45.61 kg/m²     "

## 2019-08-26 NOTE — PROGRESS NOTES
Informed Dr. Gutierrez of high bp post hydrlazine, he stated to wait 5 min and check and then redose if needed.

## 2019-08-26 NOTE — TELEPHONE ENCOUNTER
Patient is currently in the Hospital.  Message to patient threw [facility] to contact the office once she is discharged for her refill.

## 2019-08-27 ENCOUNTER — TUMOR BOARD CONFERENCE (OUTPATIENT)
Dept: SURGERY | Facility: CLINIC | Age: 69
End: 2019-08-27

## 2019-08-27 VITALS
DIASTOLIC BLOOD PRESSURE: 59 MMHG | WEIGHT: 293 LBS | HEART RATE: 82 BPM | HEIGHT: 68 IN | RESPIRATION RATE: 16 BRPM | TEMPERATURE: 99 F | BODY MASS INDEX: 44.41 KG/M2 | SYSTOLIC BLOOD PRESSURE: 127 MMHG | OXYGEN SATURATION: 96 %

## 2019-08-27 NOTE — ANESTHESIA POSTPROCEDURE EVALUATION
Anesthesia Post Evaluation    Patient: Cecile Bowen    Procedure(s) Performed: Procedure(s) (LRB):  CYSTOSCOPY (N/A)  INJECTION, BOTULINUM TOXIN, TYPE A 300 UNITS (N/A)    Final Anesthesia Type: general  Patient location during evaluation: PACU  Patient participation: Yes- Able to Participate  Level of consciousness: awake and alert and oriented  Post-procedure vital signs: reviewed and stable  Pain management: adequate  Airway patency: patent  PONV status at discharge: No PONV  Anesthetic complications: no      Cardiovascular status: hemodynamically stable  Respiratory status: unassisted, spontaneous ventilation and room air  Hydration status: euvolemic  Follow-up not needed.          Vitals Value Taken Time   /59 8/26/2019  2:49 PM   Temp 37 °C (98.6 °F) 8/26/2019  2:45 PM   Pulse 90 8/26/2019  2:50 PM   Resp 16 8/26/2019  2:45 PM   SpO2 94 % 8/26/2019  2:50 PM   Vitals shown include unvalidated device data.      No case tracking events are documented in the log.      Pain/Audrey Score: Pain Rating Prior to Med Admin: 5 (8/26/2019  2:17 PM)  Audrey Score: 10 (8/26/2019  2:49 PM)

## 2019-09-01 DIAGNOSIS — G43.711 INTRACTABLE CHRONIC MIGRAINE WITHOUT AURA AND WITH STATUS MIGRAINOSUS: ICD-10-CM

## 2019-09-02 RX ORDER — TOPIRAMATE 200 MG/1
TABLET ORAL
Qty: 180 TABLET | Refills: 3 | Status: SHIPPED | OUTPATIENT
Start: 2019-09-02 | End: 2021-02-02

## 2019-09-03 ENCOUNTER — TELEPHONE (OUTPATIENT)
Dept: SURGERY | Facility: CLINIC | Age: 69
End: 2019-09-03

## 2019-09-03 DIAGNOSIS — G43.711 INTRACTABLE CHRONIC MIGRAINE WITHOUT AURA AND WITH STATUS MIGRAINOSUS: ICD-10-CM

## 2019-09-03 RX ORDER — SUMATRIPTAN SUCCINATE 100 MG/1
100 TABLET ORAL 2 TIMES DAILY PRN
Qty: 9 TABLET | Refills: 11 | Status: SHIPPED | OUTPATIENT
Start: 2019-09-03 | End: 2020-09-21

## 2019-09-03 NOTE — TELEPHONE ENCOUNTER
Spoke with pt. States she would rather wait and reappt if Mammaprint not complete. Pt will be rescheduled with Dr Cm once Mammaprint results.Pt instructed to call 1 week from today if no call for appt. Pt voiced understanding.

## 2019-09-03 NOTE — TELEPHONE ENCOUNTER
----- Message from Eleonora Pablo sent at 9/3/2019  8:50 AM CDT -----  Contact: pt  The pt wants to know if she should keep her appt with Dr. Cm this afternoon or should she wait to see Dr. Cm.    Communication preference:  [phone]

## 2019-09-04 DIAGNOSIS — C50.412 MALIGNANT NEOPLASM OF UPPER-OUTER QUADRANT OF LEFT BREAST IN FEMALE, ESTROGEN RECEPTOR POSITIVE: Primary | ICD-10-CM

## 2019-09-04 DIAGNOSIS — Z17.0 MALIGNANT NEOPLASM OF UPPER-OUTER QUADRANT OF LEFT BREAST IN FEMALE, ESTROGEN RECEPTOR POSITIVE: Primary | ICD-10-CM

## 2019-09-04 RX ORDER — BUTALBITAL, ACETAMINOPHEN AND CAFFEINE 50; 325; 40 MG/1; MG/1; MG/1
TABLET ORAL
Qty: 10 TABLET | Refills: 0 | OUTPATIENT
Start: 2019-09-04

## 2019-09-10 RX ORDER — BUTALBITAL, ACETAMINOPHEN AND CAFFEINE 50; 325; 40 MG/1; MG/1; MG/1
TABLET ORAL
Qty: 10 TABLET | Refills: 0 | Status: SHIPPED | OUTPATIENT
Start: 2019-09-10 | End: 2019-10-10 | Stop reason: SDUPTHER

## 2019-09-11 ENCOUNTER — PATIENT MESSAGE (OUTPATIENT)
Dept: NEPHROLOGY | Facility: CLINIC | Age: 69
End: 2019-09-11

## 2019-09-11 ENCOUNTER — PATIENT MESSAGE (OUTPATIENT)
Dept: ADMINISTRATIVE | Facility: OTHER | Age: 69
End: 2019-09-11

## 2019-09-11 ENCOUNTER — PATIENT MESSAGE (OUTPATIENT)
Dept: SURGERY | Facility: CLINIC | Age: 69
End: 2019-09-11

## 2019-09-14 RX ORDER — VENLAFAXINE HYDROCHLORIDE 75 MG/1
150 CAPSULE, EXTENDED RELEASE ORAL DAILY
Qty: 180 CAPSULE | Refills: 1 | Status: SHIPPED | OUTPATIENT
Start: 2019-09-14 | End: 2019-10-03

## 2019-09-14 RX ORDER — ROSUVASTATIN CALCIUM 20 MG/1
TABLET, COATED ORAL
Qty: 90 TABLET | Refills: 1 | Status: SHIPPED | OUTPATIENT
Start: 2019-09-14 | End: 2020-03-22

## 2019-09-17 ENCOUNTER — OFFICE VISIT (OUTPATIENT)
Dept: SURGERY | Facility: CLINIC | Age: 69
End: 2019-09-17
Payer: MEDICARE

## 2019-09-17 VITALS
RESPIRATION RATE: 16 BRPM | TEMPERATURE: 99 F | HEART RATE: 99 BPM | BODY MASS INDEX: 45.61 KG/M2 | SYSTOLIC BLOOD PRESSURE: 164 MMHG | DIASTOLIC BLOOD PRESSURE: 96 MMHG | HEIGHT: 68 IN

## 2019-09-17 DIAGNOSIS — M81.8 OSTEOPOROSIS DUE TO AROMATASE INHIBITOR: ICD-10-CM

## 2019-09-17 DIAGNOSIS — T38.6X5A OSTEOPOROSIS DUE TO AROMATASE INHIBITOR: ICD-10-CM

## 2019-09-17 DIAGNOSIS — Z17.0 MALIGNANT NEOPLASM OF UPPER-OUTER QUADRANT OF LEFT BREAST IN FEMALE, ESTROGEN RECEPTOR POSITIVE: Primary | ICD-10-CM

## 2019-09-17 DIAGNOSIS — C50.412 MALIGNANT NEOPLASM OF UPPER-OUTER QUADRANT OF LEFT BREAST IN FEMALE, ESTROGEN RECEPTOR POSITIVE: Primary | ICD-10-CM

## 2019-09-17 PROCEDURE — 99213 OFFICE O/P EST LOW 20 MIN: CPT | Mod: PBBFAC | Performed by: INTERNAL MEDICINE

## 2019-09-17 PROCEDURE — 99215 OFFICE O/P EST HI 40 MIN: CPT | Mod: S$PBB,,, | Performed by: INTERNAL MEDICINE

## 2019-09-17 PROCEDURE — 99215 PR OFFICE/OUTPT VISIT, EST, LEVL V, 40-54 MIN: ICD-10-PCS | Mod: S$PBB,,, | Performed by: INTERNAL MEDICINE

## 2019-09-17 PROCEDURE — 99999 PR PBB SHADOW E&M-EST. PATIENT-LVL III: ICD-10-PCS | Mod: PBBFAC,,, | Performed by: INTERNAL MEDICINE

## 2019-09-17 PROCEDURE — 99999 PR PBB SHADOW E&M-EST. PATIENT-LVL III: CPT | Mod: PBBFAC,,, | Performed by: INTERNAL MEDICINE

## 2019-09-17 RX ORDER — DIPHENHYDRAMINE HCL 25 MG
25 CAPSULE ORAL DAILY PRN
Status: ON HOLD | COMMUNITY
End: 2021-07-06 | Stop reason: HOSPADM

## 2019-09-17 RX ORDER — ANASTROZOLE 1 MG/1
1 TABLET ORAL DAILY
Qty: 90 TABLET | Refills: 2 | Status: SHIPPED | OUTPATIENT
Start: 2019-09-17 | End: 2020-08-26

## 2019-09-17 NOTE — PROGRESS NOTES
Subjective:       Patient ID: Cecile Bowen is a 69 y.o. female.    Chief Complaint: No chief complaint on file.    HPI   REFERRING PHYSICIAN:  Samia Fulton M.D.    REASON FOR REFERRAL:  Recent diagnosis of breast cancer, consideration for   adjuvant treatment.    HISTORY OF PRESENT ILLNESS:  Ms. Bowen is a pleasant 69-year-old female with   multiple comorbidities who recently had an abnormal mammogram.  This led to a   core biopsy that was performed on 07/08/2019, and showed a grade 1 infiltrating   ductal carcinoma that was ER strongly positive, TX positive and HER-2 negative.    The patient was seen by Dr. Fulton and on 08/01/2019, she underwent a left   modified radical mastectomy with sentinel lymph node biopsy.  The pathology   report from the procedure indicates that she had a 1.2 cm grade 1 carcinoma.    Resection margins were clear and white.  Unfortunately, one of two sentinel   lymph nodes had micrometastases.  The patient has made an uneventful recovery   and she is here to discuss adjuvant treatment options.  Of note, a MammaPrint   was sent and was low score predicting for 97.8% probability of disease free   survival at five years with hormonal management.    PAST MEDICAL HISTORY:  As above.  She has multiple comorbidities including CKD,   for which she has been followed by Dr. López; diabetes, currently on insulin;   a chronic suprapubic cystostomy that has been present for approximately three   years and hypertension.  She also suffers from migraines.    PAST SURGICAL HISTORY:  Significant for hysterectomy in 1985, tonsillectomy,   cholecystectomy and a suprapubic cystostomy.    FAMILY HISTORY:  Maternal grandfather had lung cancer while paternal grandmother   had colon cancer.  A brother has been diagnosed with prostate cancer.    SOCIAL HISTORY:  She is .  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  She did not smoke and does not drink alcohol.    GYNECOLOGICAL  HISTORY:  Menarche was at 14 and she underwent a hysterectomy in   1986.  She states that she had endometriosis.      Review of Systems    Overall she feels well.  She has fully recovered from her surgery, and has no pain.  She states that the left chest wall feels numb since hewr surgery.   She mentions her occasional migraines, and also fatigue and SOB with exertion.  She is using a scooter for ambulation due to her DJD.   She denies any anxiety, depression, easy bruising, fevers, chills, night  sweats, weight loss, nausea, vomiting, diarrhea, constipation, diplopia, blurred vision, chest pain, palpitations, breast pain, abdominal pain, extremity pain, or difficulty ambulating.  The remainder of the ten-point ROS, including general, skin, lymph, H/N, cardiorespiratory, GI, , Neuro, Endocrine, and psychiatric is negative.     Objective:      Physical Exam      She is alert, oriented to time, place, person, pleasant, well      nourished, in no acute physical distress.   She was examined on her scooter as she is not able to climb on the examination table.                                  VITAL SIGNS:  Reviewed                                      HEENT:  Normal.  There are no nasal, oral, lip, gingival, auricular, lid,    or conjunctival lesions.  Mucosae are moist and pink, and there is no        thrush.  Pupils are equal, reactive to light and accommodation.              Extraocular muscle movements are intact.  She is edentulous.  There is no frontal or maxillary tenderness.                                     NECK:  Supple without JVD, adenopathy, or thyromegaly.                       LUNGS:  Clear to auscultation without wheezing, rales, or rhonchi.           CARDIOVASCULAR:  Reveals an S1, S2, no murmurs, no rubs, no gallops.         ABDOMEN:  Soft, morbidly obese, nontender, without organomegaly.  Bowel sounds are    present. She does have a permanent suprapubic cystostomy.                                                                     EXTREMITIES:  No cyanosis, clubbing, or edema.                               BREASTS:  She is status post left mastectomy with a well-healed incision and no evidence of a chest wall recurrence.  There are no masses in the right breast.                                 LYMPHATIC:  There is no cervical, axillary, or supraclavicular adenopathy.   SKIN:  Warm and moist, without petechiae, rashes, induration, or ecchymoses.           NEUROLOGIC:  DTRs are 0-1+ bilaterally, symmetrical, motor function is 5/5,  and cranial nerves are  within normal limits.    Assessment:       1. Malignant neoplasm of upper-outer quadrant of left breast in female, estrogen receptor positive      2.    DM, Morbid obesity, extensive DJD, s/p suprabubic cystostomy.  Plan:         I had a long discussion with her;   She had a T1c grade I cancer, while her sentinel node was microscopically positive.   Her mammaprint score was low.   She is a poor candidate for chemotherapy given her poor PS and her multiple comorbidities, and she does not want to receive chemotherapy either.  The pros and cons of adjuvant hormonal therapy with anastrazole were discussed in detail.    The pros and cons of a full AND were also discussed with her;  She stated that she would rather not have any additional surgery.   I have asked Dr. Fulton to see her, and she has graciously agreed to see her later today.  I suspect that she will ultimately decide against a full AND, in which case she may begin her adjuvant hormonal therapy.    I have given her a prescription for anastrazole, and assuming that she will start taking it within the next few days, I will see her again in late October with a bone density scan.  Her multiple questions were answered to her satisfaction.  I spent approximately 60 minutes reviewing the available records and evaluating the patient, out of which over 50% of the time was spent face to face with the patient in  counseling and coordinating this patient's care.

## 2019-09-19 ENCOUNTER — TELEPHONE (OUTPATIENT)
Dept: PHARMACY | Facility: CLINIC | Age: 69
End: 2019-09-19

## 2019-09-23 DIAGNOSIS — R39.15 URINARY URGENCY: ICD-10-CM

## 2019-09-23 RX ORDER — OXYBUTYNIN CHLORIDE 10 MG/1
10 TABLET, EXTENDED RELEASE ORAL DAILY
Qty: 90 TABLET | Refills: 3 | Status: SHIPPED | OUTPATIENT
Start: 2019-09-23 | End: 2019-11-26

## 2019-09-24 PROBLEM — Z12.11 SPECIAL SCREENING FOR MALIGNANT NEOPLASMS, COLON: Status: RESOLVED | Noted: 2017-01-13 | Resolved: 2019-09-24

## 2019-09-24 PROBLEM — C50.412 MALIGNANT NEOPLASM OF UPPER-OUTER QUADRANT OF LEFT BREAST IN FEMALE, ESTROGEN RECEPTOR POSITIVE: Status: RESOLVED | Noted: 2019-07-19 | Resolved: 2019-09-24

## 2019-09-24 PROBLEM — R06.09 DOE (DYSPNEA ON EXERTION): Status: RESOLVED | Noted: 2019-06-14 | Resolved: 2019-09-24

## 2019-09-24 PROBLEM — Z91.81 RISK FOR FALLS: Status: ACTIVE | Noted: 2019-09-24

## 2019-09-24 PROBLEM — E11.22 CKD STAGE 4 DUE TO TYPE 2 DIABETES MELLITUS: Status: ACTIVE | Noted: 2019-01-20

## 2019-09-24 PROBLEM — Z17.0 MALIGNANT NEOPLASM OF UPPER-OUTER QUADRANT OF LEFT BREAST IN FEMALE, ESTROGEN RECEPTOR POSITIVE: Status: RESOLVED | Noted: 2019-07-19 | Resolved: 2019-09-24

## 2019-09-24 PROBLEM — Z91.89 AT RISK FOR LYMPHEDEMA: Status: RESOLVED | Noted: 2019-07-24 | Resolved: 2019-09-24

## 2019-09-24 PROBLEM — G44.40 MEDICATION OVERUSE HEADACHE: Status: RESOLVED | Noted: 2017-01-20 | Resolved: 2019-09-24

## 2019-09-25 ENCOUNTER — OFFICE VISIT (OUTPATIENT)
Dept: INTERNAL MEDICINE | Facility: CLINIC | Age: 69
End: 2019-09-25
Payer: MEDICARE

## 2019-09-25 VITALS
HEIGHT: 68 IN | RESPIRATION RATE: 15 BRPM | HEART RATE: 107 BPM | OXYGEN SATURATION: 96 % | WEIGHT: 293 LBS | BODY MASS INDEX: 44.41 KG/M2 | DIASTOLIC BLOOD PRESSURE: 70 MMHG | SYSTOLIC BLOOD PRESSURE: 120 MMHG

## 2019-09-25 DIAGNOSIS — Z90.12 S/P LEFT MASTECTOMY: ICD-10-CM

## 2019-09-25 DIAGNOSIS — N18.4 CKD STAGE 4 DUE TO TYPE 2 DIABETES MELLITUS: ICD-10-CM

## 2019-09-25 DIAGNOSIS — G47.00 INSOMNIA, UNSPECIFIED TYPE: ICD-10-CM

## 2019-09-25 DIAGNOSIS — Z93.6 PRESENCE OF UROSTOMY: ICD-10-CM

## 2019-09-25 DIAGNOSIS — D50.9 IRON DEFICIENCY ANEMIA, UNSPECIFIED IRON DEFICIENCY ANEMIA TYPE: ICD-10-CM

## 2019-09-25 DIAGNOSIS — M47.816 FACET ARTHRITIS OF LUMBAR REGION: ICD-10-CM

## 2019-09-25 DIAGNOSIS — E78.5 HYPERLIPIDEMIA ASSOCIATED WITH TYPE 2 DIABETES MELLITUS: ICD-10-CM

## 2019-09-25 DIAGNOSIS — R33.9 URINARY RETENTION: ICD-10-CM

## 2019-09-25 DIAGNOSIS — E66.01 MORBID OBESITY DUE TO EXCESS CALORIES: ICD-10-CM

## 2019-09-25 DIAGNOSIS — G43.909 MIXED MIGRAINE AND MUSCLE CONTRACTION HEADACHE: ICD-10-CM

## 2019-09-25 DIAGNOSIS — R51.9 CHRONIC DAILY HEADACHE: ICD-10-CM

## 2019-09-25 DIAGNOSIS — M15.9 PRIMARY OSTEOARTHRITIS INVOLVING MULTIPLE JOINTS: ICD-10-CM

## 2019-09-25 DIAGNOSIS — Z17.0 MALIGNANT NEOPLASM OF LEFT BREAST IN FEMALE, ESTROGEN RECEPTOR POSITIVE, UNSPECIFIED SITE OF BREAST: ICD-10-CM

## 2019-09-25 DIAGNOSIS — R26.9 ABNORMALITY OF GAIT AND MOBILITY: ICD-10-CM

## 2019-09-25 DIAGNOSIS — Z74.1 REQUIRES DAILY ASSISTANCE FOR ACTIVITIES OF DAILY LIVING (ADL) AND COMFORT NEEDS: ICD-10-CM

## 2019-09-25 DIAGNOSIS — N13.30 HYDRONEPHROSIS, UNSPECIFIED HYDRONEPHROSIS TYPE: ICD-10-CM

## 2019-09-25 DIAGNOSIS — E11.59 OBESITY, DIABETES, AND HYPERTENSION SYNDROME: ICD-10-CM

## 2019-09-25 DIAGNOSIS — M79.7 FIBROMYALGIA: ICD-10-CM

## 2019-09-25 DIAGNOSIS — Z91.81 RISK FOR FALLS: ICD-10-CM

## 2019-09-25 DIAGNOSIS — Z79.891 LONG TERM PRESCRIPTION OPIATE USE: ICD-10-CM

## 2019-09-25 DIAGNOSIS — N25.81 SECONDARY HYPERPARATHYROIDISM, RENAL: ICD-10-CM

## 2019-09-25 DIAGNOSIS — M47.816 LUMBAR SPONDYLOSIS: ICD-10-CM

## 2019-09-25 DIAGNOSIS — Z93.59 CHRONIC SUPRAPUBIC CATHETER: ICD-10-CM

## 2019-09-25 DIAGNOSIS — F33.1 MAJOR DEPRESSIVE DISORDER, RECURRENT EPISODE, MODERATE: ICD-10-CM

## 2019-09-25 DIAGNOSIS — E66.9 OBESITY, DIABETES, AND HYPERTENSION SYNDROME: ICD-10-CM

## 2019-09-25 DIAGNOSIS — G44.209 MIXED MIGRAINE AND MUSCLE CONTRACTION HEADACHE: ICD-10-CM

## 2019-09-25 DIAGNOSIS — I15.2 OBESITY, DIABETES, AND HYPERTENSION SYNDROME: ICD-10-CM

## 2019-09-25 DIAGNOSIS — E11.22 CKD STAGE 4 DUE TO TYPE 2 DIABETES MELLITUS: ICD-10-CM

## 2019-09-25 DIAGNOSIS — G43.809 CERVICOGENIC MIGRAINE: ICD-10-CM

## 2019-09-25 DIAGNOSIS — E03.9 HYPOTHYROIDISM, UNSPECIFIED TYPE: ICD-10-CM

## 2019-09-25 DIAGNOSIS — M85.80 OSTEOPENIA, UNSPECIFIED LOCATION: ICD-10-CM

## 2019-09-25 DIAGNOSIS — E11.69 HYPERLIPIDEMIA ASSOCIATED WITH TYPE 2 DIABETES MELLITUS: ICD-10-CM

## 2019-09-25 DIAGNOSIS — I15.2 HYPERTENSION ASSOCIATED WITH DIABETES: ICD-10-CM

## 2019-09-25 DIAGNOSIS — Z00.00 ENCOUNTER FOR PREVENTIVE HEALTH EXAMINATION: Primary | ICD-10-CM

## 2019-09-25 DIAGNOSIS — G89.4 CHRONIC PAIN SYNDROME: ICD-10-CM

## 2019-09-25 DIAGNOSIS — E11.69 OBESITY, DIABETES, AND HYPERTENSION SYNDROME: ICD-10-CM

## 2019-09-25 DIAGNOSIS — R91.1 PULMONARY NODULE: ICD-10-CM

## 2019-09-25 DIAGNOSIS — N31.9 NEUROGENIC BLADDER: ICD-10-CM

## 2019-09-25 DIAGNOSIS — Z13.5 SCREENING FOR EYE CONDITION: ICD-10-CM

## 2019-09-25 DIAGNOSIS — E55.9 VITAMIN D DEFICIENCY DISEASE: ICD-10-CM

## 2019-09-25 DIAGNOSIS — C50.912 MALIGNANT NEOPLASM OF LEFT BREAST IN FEMALE, ESTROGEN RECEPTOR POSITIVE, UNSPECIFIED SITE OF BREAST: ICD-10-CM

## 2019-09-25 DIAGNOSIS — Z23 NEED FOR INFLUENZA VACCINATION: ICD-10-CM

## 2019-09-25 DIAGNOSIS — M47.816 SPONDYLOSIS OF LUMBAR REGION WITHOUT MYELOPATHY OR RADICULOPATHY: ICD-10-CM

## 2019-09-25 DIAGNOSIS — G47.33 OSA (OBSTRUCTIVE SLEEP APNEA): ICD-10-CM

## 2019-09-25 DIAGNOSIS — D64.3: ICD-10-CM

## 2019-09-25 DIAGNOSIS — G43.719 INTRACTABLE CHRONIC MIGRAINE WITHOUT AURA AND WITHOUT STATUS MIGRAINOSUS: ICD-10-CM

## 2019-09-25 DIAGNOSIS — E11.59 HYPERTENSION ASSOCIATED WITH DIABETES: ICD-10-CM

## 2019-09-25 DIAGNOSIS — H25.10 NUCLEAR SCLEROSIS, UNSPECIFIED LATERALITY: ICD-10-CM

## 2019-09-25 PROCEDURE — 90662 IIV NO PRSV INCREASED AG IM: CPT | Mod: PBBFAC,PO | Performed by: NURSE PRACTITIONER

## 2019-09-25 PROCEDURE — 99999 PR PBB SHADOW E&M-EST. PATIENT-LVL V: ICD-10-PCS | Mod: PBBFAC,,, | Performed by: NURSE PRACTITIONER

## 2019-09-25 PROCEDURE — G0008 ADMIN INFLUENZA VIRUS VAC: HCPCS | Mod: PBBFAC,PO

## 2019-09-25 PROCEDURE — G0008 FLU VACCINE - HIGH DOSE (65+) PRESERVATIVE FREE IM: ICD-10-PCS | Mod: S$PBB,,, | Performed by: NURSE PRACTITIONER

## 2019-09-25 PROCEDURE — G0439 PR MEDICARE ANNUAL WELLNESS SUBSEQUENT VISIT: ICD-10-PCS | Mod: S$PBB,,, | Performed by: NURSE PRACTITIONER

## 2019-09-25 PROCEDURE — 99215 OFFICE O/P EST HI 40 MIN: CPT | Mod: PBBFAC,PO,25 | Performed by: NURSE PRACTITIONER

## 2019-09-25 PROCEDURE — G0439 PPPS, SUBSEQ VISIT: HCPCS | Mod: S$PBB,,, | Performed by: NURSE PRACTITIONER

## 2019-09-25 PROCEDURE — G0008 ADMIN INFLUENZA VIRUS VAC: HCPCS | Mod: S$PBB,,, | Performed by: NURSE PRACTITIONER

## 2019-09-25 PROCEDURE — 99999 PR PBB SHADOW E&M-EST. PATIENT-LVL V: CPT | Mod: PBBFAC,,, | Performed by: NURSE PRACTITIONER

## 2019-09-25 NOTE — PATIENT INSTRUCTIONS
Counseling and Referral of Other Preventative  (Italic type indicates deductible and co-insurance are waived)    Patient Name: Cecile Bowen  Today's Date: 9/25/2019    Health Maintenance       Date Due Completion Date    Eye Exam Due- referral   8/23/2018    Influenza Vaccine (1) Done today   10/31/2018    Foot Exam 10/24/2019   10/24/2018    TETANUS VACCINE In the future for skin trauma   ---    Shingles Vaccine (2 of 3) Discuss need to repeat with PCP   12/16/2013    Hemoglobin A1c 11/30/2019 5/30/2019    DEXA SCAN Scheduled   12/15/2016    Lipid Panel 06/26/2020 6/26/2019    Mammogram Per oncology   7/8/2019    Pneumococcal Vaccine (65+ High/Highest Risk) (2 of 2 - PPSV23) N/A- already received-not needed   9/27/2017    Colonoscopy    Weight loss program- check with Medicare & secondary insurance on benefits    Social determinants- check with benefits    Referral placed to outpt care management- help with your needs           01/13/2022 1/13/2017            Orders Placed This Encounter   Procedures    Ambulatory Referral to Ophthalmology    Ambulatory referral to Outpatient Case Management     The following information is provided to all patients.  This information is to help you find resources for any of the problems found today that may be affecting your health:                Living healthy guide: www.[facility].[place].gov      Understanding Diabetes: www.diabetes.org      Eating healthy: www.cdc.gov/healthyweight      CDC home safety checklist: www.cdc.gov/steadi/patient.html      Agency on Aging: www.goea.[place].[facility]      Alcoholics anonymous (AA): www.aa.org      Physical Activity: www.zenon.nih.gov/wd0llkd      Tobacco use: www.quitwithusla.org

## 2019-09-25 NOTE — PROGRESS NOTES
"Cecile Bowen presented for a  Medicare AWV and comprehensive Health Risk Assessment today. The following components were reviewed and updated:    · Medical history  · Family History  · Social history  · Allergies and Current Medications  · Health Risk Assessment  · Health Maintenance  · Care Team     ** See Completed Assessments for Annual Wellness Visit within the encounter summary.**       The following assessments were completed:  · Living Situation  · CAGE  · Depression Screening  · Timed Get Up and Go  · Whisper Test  · Cognitive Function Screening  · Nutrition Screening  · ADL Screening  · PAQ Screening    Vitals:    09/25/19 1401   BP: 120/70   BP Location: Right arm   Patient Position: Sitting   Pulse: 107   Resp: 15   SpO2: 96%   Weight: 136.1 kg (300 lb)   Height: 5' 8" (1.727 m)     Body mass index is 45.61 kg/m².  Physical Exam   Constitutional: She is oriented to person, place, and time. She appears well-developed and well-nourished.   HENT:   Head: Normocephalic and atraumatic.   Eyes: No scleral icterus.   Cardiovascular: Normal rate, regular rhythm and normal heart sounds.   No murmur heard.  Pulmonary/Chest: Effort normal and breath sounds normal. No stridor. She has no wheezes. She has no rales.   Abdominal: Soft. Bowel sounds are normal. She exhibits no distension. There is no tenderness.   obese   Musculoskeletal: She exhibits edema. She exhibits no tenderness or deformity.   On electric scooter   Neurological: She is alert and oriented to person, place, and time. No cranial nerve deficit.   Skin: Skin is warm and dry.   Left chest wall incision well approximated & healed   Psychiatric: She has a normal mood and affect. Her behavior is normal. Judgment and thought content normal.   Nursing note and vitals reviewed.        Diagnoses and health risks identified today and associated recommendations/orders:    1. Hypertension associated with diabetes  Stable- followed by cardiology  - Ambulatory " referral to Outpatient Case Management    2. Hypothyroidism, unspecified type  Stable- followed by PCP    3. Vitamin D deficiency disease  Stable- followed by PCP    4. Fibromyalgia  Stable- followed by PCP    5. Intractable chronic migraine without aura and without status migrainosus  Stable- followed by PCP  - Ambulatory referral to Outpatient Case Management    6. VIJAYA (obstructive sleep apnea)  Stable- followed by PCP  - Ambulatory referral to Outpatient Case Management    7. Hyperlipidemia associated with type 2 diabetes mellitus  Stable- followed by PCP, cardiology  - Ambulatory referral to Outpatient Case Management    8. Nuclear sclerosis, unspecified laterality  Stable- followed by opth    9. Abnormality of gait and mobility  Stable- followed by PCP  - Ambulatory referral to Outpatient Case Management    10. Spondylosis of lumbar region without myelopathy or radiculopathy  Stable- followed by PCP  - Ambulatory referral to Outpatient Case Management    11. Primary osteoarthritis involving multiple joints  Stable- followed by PCP, rheumatology  - Ambulatory referral to Outpatient Case Management    12. Iron deficiency anemia, unspecified iron deficiency anemia type  Stable- followed by PCP, nephrology    13. Pulmonary nodule  Stable- followed by PCP-ct chest 2/9/2017    14. Mixed migraine and muscle contraction headache  Stable- followed by PCP    15. Insomnia, unspecified type  Stable- followed by PCP    16. Major depressive disorder, recurrent episode, moderate  Stable- followed by PCP  - Ambulatory referral to Outpatient Case Management    17. Neurogenic bladder  Stable- followed by urology  - Ambulatory referral to Outpatient Case Management    18. Urinary retention  Stable- followed by urology    19. Secondary hyperparathyroidism, renal  Stable- followed by nephrology  - Ambulatory referral to Outpatient Case Management    20. Uncontrolled type 2 diabetes mellitus with chronic kidney disease, with  long-term current use of insulin  Stable- followed by endocrinology  - Ambulatory referral to Outpatient Case Management    21. Uncontrolled type 2 diabetes mellitus with diabetic neuropathy, without long-term current use of insulin  Stable- followed by endocrinology  - Ambulatory referral to Outpatient Case Management    22. Chronic suprapubic catheter  Stable- followed by urology    23. Osteopenia, unspecified location  Stable- followed by PCP-DXA ordered/scheduled    24. Lumbar spondylosis  Stable- followed by PCP  - Ambulatory referral to Outpatient Case Management    25. Chronic pain syndrome  Stable- followed by PCP,PMR  - Ambulatory referral to Outpatient Case Management    26. Hydronephrosis, unspecified hydronephrosis type  Stable- followed by urology    27. Malignant neoplasm of left breast in female, estrogen receptor positive, unspecified site of breast  Stable- followed by oncology  - Ambulatory referral to Outpatient Case Management    28. Cervicogenic migraine  Stable- followed by PCP    29. Long term prescription opiate use  Stable- followed by PCP,PMR    30. Chronic daily headache  Stable- followed by PCP  - Ambulatory referral to Outpatient Case Management    31. Risk for falls  Stable- followed by PCP  - Ambulatory referral to Outpatient Case Management    32. CKD stage 4 due to type 2 diabetes mellitus  Stable- followed by nephrology  - Ambulatory referral to Outpatient Case Management    33. sideoblastic Anemia, unspecified type  Stable- followed by PCP  - Ambulatory referral to Outpatient Case Management    34. Facet arthritis of lumbar region  Stable- followed by PCP  - Ambulatory referral to Outpatient Case Management    35. Uncontrolled secondary diabetes mellitus with stage 3 CKD (GFR 30-59)  Stable- followed by endocrinology  - Ambulatory referral to Outpatient Case Management    36. Screening for eye condition  Stable- followed by opth  - Ambulatory Referral to Ophthalmology    37.  Requires daily assistance for activities of daily living (ADL) and comfort needs  Stable- followed by PCP    38. Urinary complication  Stable- followed by urology- suprapublic cath    39. Presence of urostomy  Stable- followed by PCP- has suprapublic cath    40. Encounter for preventive health examination  Assessments completed. Preventative health recommendations reviewed.       41. Need for influenza vaccination  Stable- followed by PCP  - Influenza - High Dose (65+) (PF) (IM)    42. Morbid obesity due to excess calories  Chronic . Followed by PCP.   Centers for Disease Control and Prevention (CDC)  weight recommendations for current BMI & ideal BMI range discussed with patient.  Recommended diabetic Low fat diet. Recommended wt loss program- nonsurgical . SHe will check with insurance (medicare/[employer]) on benefits & eligibility. She is limited on her ability to exercise & with her mobility- multiple comorbidities.  Uses scooter .    43. Obesity, diabetes, and hypertension syndrome  Stable- followed by PCP       44. S/P Left mastectomy  Stable- followed by oncology      Provided Cecile with a 5-10 year written screening schedule and personal prevention plan. Recommendations were developed using the USPSTF age appropriate recommendations. Education, counseling, and referrals were provided as needed. After Visit Summary printed and given to patient which includes a list of additional screenings\tests needed. Prevention of falls discussed. Outpt Case mgt referral placed- pt requires assistance w ADLSs & transportation. Needs assistance with meeting several social determinants : transportation, cooking & fixing meals, cleaning after meals & house keeping, grocery shopping, ambulating, picking up RX from pharmacy, etc).Requesting fluvax- given. suprapubic catheter changed per urology.    Follow up in about 1 year (around 9/25/2020) for HRA.    MARTÍN Diaz offered to discuss end of life issues, including  information on how to make advance directives that the patient could use to name someone who would make medical decisions on their behalf if they became too ill to make themselves.    ___Patient declined  _X_Patient is interested, I provided paper work and offered to discuss.

## 2019-09-25 NOTE — Clinical Note
Primary Care Providers:Lurdes Navarro MD, MD (General)Your patient was seen today for a HRA visit. Gap(s) in care (HEDIS gaps) have been identified during this visit that require additional testing and possible follow up.Orders Placed This Encounter    Influenza - High Dose (65+) (PF) (IM)    Ambulatory Referral to Ophthalmology        Referral Priority:Routine        Referral Type:Consultation        Referral Reason:Specialty Services Required        Requested Specialty:Ophthalmology        Number of Visits Requested:1    Ambulatory referral to Outpatient Case Management        Referral Priority:Routine        Referral Type:Consultation        Referral Reason:Specialty Services Required        Number of Visits Requested:1These orders were placed using [facility] approved protocol and any results will be forwarded to your office for appropriate follow up. I have included a copy of my visit note; please review the note and feel free to contact me with any quest

## 2019-09-26 ENCOUNTER — OUTPATIENT CASE MANAGEMENT (OUTPATIENT)
Dept: ADMINISTRATIVE | Facility: OTHER | Age: 69
End: 2019-09-26

## 2019-09-26 ENCOUNTER — PATIENT MESSAGE (OUTPATIENT)
Dept: NEPHROLOGY | Facility: CLINIC | Age: 69
End: 2019-09-26

## 2019-09-26 NOTE — LETTER
October 15, 2019             Dear Ms. Bowen,    Welcome to [facility] Complex Care Management Program.  It was a pleasure talking with you today.  My name is Ezequiel Cornejo, and I look forward to being your Care Manager.  My goal is to help you function at the healthiest and highest level possible.  You can contact me directly at [phone].    As an [facility] patient, some of the services we may be able to provide include:      Development of an individualized care plan with a Registered Nurse    Connection with a    Connection with available resources and services     Coordinate communication among your care team members    Provide coaching and education    Help you understand your doctors treatment plan   Help you obtain information about your insurance coverage.     All services provided by [facility]s Complex Care Managers and other care team members are coordinated with and communicated to your primary care team.      As part of your enrollment, you will be receiving education materials and more information about these services in your [facility] account, by phone or through the mail.  If you do not wish to participate or receive information, please contact our office at [phone].      Sincerely,        Ezequiel Cornejo, RN  [facility]   Out-patient RN Complex Care Manager

## 2019-09-27 ENCOUNTER — PATIENT MESSAGE (OUTPATIENT)
Dept: PHYSICAL MEDICINE AND REHAB | Facility: CLINIC | Age: 69
End: 2019-09-27

## 2019-09-27 ENCOUNTER — PATIENT OUTREACH (OUTPATIENT)
Dept: ADMINISTRATIVE | Facility: OTHER | Age: 69
End: 2019-09-27

## 2019-09-27 DIAGNOSIS — G89.29 CHRONIC NECK PAIN: ICD-10-CM

## 2019-09-27 DIAGNOSIS — M54.2 CHRONIC NECK PAIN: ICD-10-CM

## 2019-09-27 DIAGNOSIS — M79.7 FIBROMYALGIA: ICD-10-CM

## 2019-09-27 DIAGNOSIS — M54.50 CHRONIC MIDLINE LOW BACK PAIN WITHOUT SCIATICA: ICD-10-CM

## 2019-09-27 DIAGNOSIS — G89.29 CHRONIC MIDLINE LOW BACK PAIN WITHOUT SCIATICA: ICD-10-CM

## 2019-09-27 RX ORDER — HYDROCODONE BITARTRATE AND ACETAMINOPHEN 10; 325 MG/1; MG/1
1 TABLET ORAL EVERY 6 HOURS PRN
Qty: 120 TABLET | Refills: 0 | Status: SHIPPED | OUTPATIENT
Start: 2019-09-28 | End: 2019-10-03 | Stop reason: SDUPTHER

## 2019-09-27 RX ORDER — LANOLIN ALCOHOL/MO/W.PET/CERES
CREAM (GRAM) TOPICAL
Qty: 180 TABLET | Refills: 3 | Status: SHIPPED | OUTPATIENT
Start: 2019-09-27 | End: 2021-01-28

## 2019-09-27 NOTE — TELEPHONE ENCOUNTER
Last Rx refill-----08/26/19  Last office visit-- 05/27/19  Next office visit--10/01/19        ----- Message from Teena Sanchez sent at 9/27/2019  2:46 PM CDT -----  Contact: self @ [phone]  Pt is calling for the status of her refill request for hydrocodone 10/325.  Pt says she took the last pill this morning and is now in a lot of pain.  Pls call.     [facility]/pharmacy [id number] - [place] - [address]   [phone] (Phone)           [phone] (Fax)

## 2019-09-30 ENCOUNTER — OFFICE VISIT (OUTPATIENT)
Dept: UROLOGY | Facility: CLINIC | Age: 69
End: 2019-09-30
Payer: MEDICARE

## 2019-09-30 DIAGNOSIS — N31.9 NEUROGENIC BLADDER: ICD-10-CM

## 2019-09-30 DIAGNOSIS — Z93.59 CHRONIC SUPRAPUBIC CATHETER: ICD-10-CM

## 2019-09-30 DIAGNOSIS — R33.9 URINARY RETENTION: ICD-10-CM

## 2019-09-30 DIAGNOSIS — Z43.5 ENCOUNTER FOR CARE OR REPLACEMENT OF SUPRAPUBIC TUBE: Primary | ICD-10-CM

## 2019-09-30 PROCEDURE — 51705 CHANGE OF BLADDER TUBE: CPT | Mod: S$PBB,,, | Performed by: NURSE PRACTITIONER

## 2019-09-30 PROCEDURE — 51705 CHANGE OF BLADDER TUBE: CPT | Mod: PBBFAC | Performed by: NURSE PRACTITIONER

## 2019-09-30 PROCEDURE — 99999 PR PBB SHADOW E&M-EST. PATIENT-LVL IV: CPT | Mod: PBBFAC,,, | Performed by: NURSE PRACTITIONER

## 2019-09-30 PROCEDURE — 99214 OFFICE O/P EST MOD 30 MIN: CPT | Mod: S$PBB,25,, | Performed by: NURSE PRACTITIONER

## 2019-09-30 PROCEDURE — 99214 OFFICE O/P EST MOD 30 MIN: CPT | Mod: PBBFAC | Performed by: NURSE PRACTITIONER

## 2019-09-30 PROCEDURE — 99999 PR PBB SHADOW E&M-EST. PATIENT-LVL IV: ICD-10-PCS | Mod: PBBFAC,,, | Performed by: NURSE PRACTITIONER

## 2019-09-30 PROCEDURE — 51705 PR CHANGE OF BLADDER TUBE,SIMPLE: ICD-10-PCS | Mod: S$PBB,,, | Performed by: NURSE PRACTITIONER

## 2019-09-30 PROCEDURE — 99214 PR OFFICE/OUTPT VISIT, EST, LEVL IV, 30-39 MIN: ICD-10-PCS | Mod: S$PBB,25,, | Performed by: NURSE PRACTITIONER

## 2019-09-30 NOTE — PROGRESS NOTES
CHIEF COMPLAINT:    Ms. Bowen is a 69 y.o. female presenting for SPT change.    PRESENTING ILLNESS:    Cecile Bowen is a 69 y.o. female new patient to me (records of past medical, family and social history personally reviewed by me), w/ h/o DM, breast CA s/p mastectomy 8/1/19, bilateral hydro, incomplete bladder emptying, neurogenic bladder managed w/ 16 Fr SPT, bladder spasms, s/p cysto w/ botox on 8/26/19 w/ Dr. Whittington.    Today pt returns to clinic for SPT change. She reports feeling well since cysto w/ botox procedure. Denies complaints.    REVIEW OF SYSTEMS:    Cecile Bowen denies headache, blurred vision, fever, nausea, vomiting, chills, abdominal pain, pelvic pain, flank pain, bleeding per rectum, cough, SOB, recent loss of consciousness, recent mental status changes, seizures, dizziness, or upper or lower extremity weakness.    PATIENT HISTORY:    Past Medical History:   Diagnosis Date    Cervicogenic migraine     Chronic pain     CKD (chronic kidney disease) stage 4, GFR 15-29 ml/min     [facility]    CKD (chronic kidney disease) stage 4, GFR 15-29 ml/min     Depression     Diabetes mellitus     Long term use of Insulin, Diabetic Neuropathy    Fibromyalgia     Hydronephrosis     Hyperlipidemia     Hypertension 12/12/2012    Hypothyroidism 12/12/2012    ARON (iron deficiency anemia)     Insomnia     Levoscoliosis     Malignant neoplasm of upper-outer quadrant of left breast in female, estrogen receptor positive     Metabolic acidosis     Migraine     Mobility impaired     Nuclear sclerosis - Both Eyes 3/24/2014    OA (osteoarthritis of the spine)     Lumbar    Obesity 12/12/2012    VIJAYA (obstructive sleep apnea)     Osteopenia     Pulmonary nodule     Recurrent UTI     Secondary hyperparathyroidism, renal     Urinary retention     Vitamin D deficiency        Past Surgical History:   Procedure Laterality Date    BREAST BIOPSY Right     benign    CHOLECYSTECTOMY       COLONOSCOPY N/A 1/13/2017    Procedure: COLONOSCOPY;  Surgeon: Morris Wiseman MD;  Location: [facility] (4TH FLR);  Service: Endoscopy;  Laterality: N/A;  Renal pt Nephrology advised to avoid phosphate preps    CYSTOSCOPY N/A 10/8/2018    Procedure: CYSTOSCOPY;  Surgeon: Ronel Whittington MD;  [facility];  Service: Urology;  Laterality: N/A;  45 min    CYSTOSCOPY N/A 3/25/2019    Procedure: CYSTOSCOPY;  Surgeon: Ronel Whittington MD;  [facility];  Service: Urology;  Laterality: N/A;  45 min    CYSTOSCOPY N/A 8/26/2019    Procedure: CYSTOSCOPY;  Surgeon: Ronel Whittington MD;  [facility];  Service: Urology;  Laterality: N/A;  45 min    DILATION AND CURETTAGE OF UTERUS      GALLBLADDER SURGERY  2006    HYSTERECTOMY      INJECTION FOR SENTINEL NODE IDENTIFICATION Left 8/1/2019    Procedure: INJECTION, FOR SENTINEL NODE IDENTIFICATION;  Surgeon: Samia Fulton MD;  Location: [facility] OR 2ND FLR;  Service: General;  Laterality: Left;    INJECTION OF BOTULINUM TOXIN TYPE A N/A 10/8/2018    Procedure: INJECTION, BOTULINUM TOXIN, TYPE A 300 UNITS;  Surgeon: Ronel Whittington MD;  Location: [facility] OR [address] [place] [address];  Service: Urology;  Laterality: N/A;    INJECTION OF BOTULINUM TOXIN TYPE A N/A 3/25/2019    Procedure: INJECTION, BOTULINUM TOXIN, TYPE A 300 UNITS;  Surgeon: Ronel Whittington MD;  Location: [facility] OR [address] [place] [address];  Service: Urology;  Laterality: N/A;    INJECTION OF BOTULINUM TOXIN TYPE A N/A 8/26/2019    Procedure: INJECTION, BOTULINUM TOXIN, TYPE A 300 UNITS;  Surgeon: Ronel Whittington MD;  Location: [facility] OR [address] [place] [address];  Service: Urology;  Laterality: N/A;    MASTECTOMY Left 8/1/2019    Procedure: MASTECTOMY 23 hour stay;  Surgeon: Samia Fulton MD;  Location: [facility] OR 2ND FLR;  Service: General;  Laterality: Left;    OVARIAN CYST SURGERY  1985    SENTINEL LYMPH NODE BIOPSY Left 8/1/2019    Procedure: BIOPSY, LYMPH NODE, SENTINEL;  Surgeon:  Samia Fulton MD;  Location: [facility] OR [address] [place] [address];  Service: General;  Laterality: Left;    spt placement      TONSILLECTOMY, ADENOIDECTOMY      TOTAL ABDOMINAL HYSTERECTOMY W/ BILATERAL SALPINGOOPHORECTOMY  1985       Family History   Problem Relation Age of Onset    Diabetes Sister     Kidney disease Sister         CKD III    ALS Mother         d.    Cancer Maternal Grandmother         d. colon    Cancer Paternal Grandfather         d. lung    Scoliosis Brother         increased pain    Prostate cancer Brother         cured s/p surgery    Cancer Brother         remission     Diabetes Maternal Aunt     Kidney disease Maternal Aunt     Diabetes Maternal Uncle     Amblyopia Neg Hx     Blindness Neg Hx     Cataracts Neg Hx     Glaucoma Neg Hx     Macular degeneration Neg Hx     Retinal detachment Neg Hx     Strabismus Neg Hx        Social History     Socioeconomic History    Marital status:      Spouse name: Not on file    Number of children: Not on file    Years of education: Not on file    Highest education level: Not on file   Occupational History    Occupation: teacher     Comment: retired   Social Needs    Financial resource strain: Not on file    Food insecurity:     Worry: Not on file     Inability: Not on file    Transportation needs:     Medical: Not on file     Non-medical: Not on file   Tobacco Use    Smoking status: Never Smoker    Smokeless tobacco: Never Used   Substance and Sexual Activity    Alcohol use: No     Alcohol/week: 0.0 standard drinks    Drug use: No    Sexual activity: Not Currently     Birth control/protection: Abstinence   Lifestyle    Physical activity:     Days per week: Not on file     Minutes per session: Not on file    Stress: Not on file   Relationships    Social connections:     Talks on phone: Not on file     Gets together: Not on file     Attends Buddhism service: Not on file     Active member of club or organization: Not on file      "Attends meetings of clubs or organizations: Not on file     Relationship status: Not on file   Other Topics Concern    Not on file   Social History Narrative    Single ()        Lives w/ sister and brother. Pt needs to keep her narcotics hidden from her brother who will steal them            Allergies:  Patient has no known allergies.    Medications:    Current Outpatient Medications:     anastrozole (ARIMIDEX) 1 mg Tab, Take 1 tablet (1 mg total) by mouth once daily., Disp: 90 tablet, Rfl: 2    BD INSULIN SYRINGE ULTRA-FINE 0.5 mL 31 gauge x 5/16" Syrg, USE WITH INSULIN 4 TIMES A DAY, Disp: 100 each, Rfl: 12    blood sugar diagnostic Strp, ONE TOUCH ULTRA TEST STRIPS//Check blood sugars QID, Disp: 200 strip, Rfl: 6    blood-glucose meter kit, ONE TOUCH ULTRA, Disp: 1 each, Rfl: 0    butalbital-acetaminophen-caffeine -40 mg (FIORICET, ESGIC) -40 mg per tablet, TAKE 1 TABLET BY MOUTH WHEN NOT CONTROLLED BY OTHER MEDS. NO MORE THAN 10 TABS PER 30 DAYS, Disp: 10 tablet, Rfl: 0    cloNIDine (CATAPRES) 0.1 MG tablet, Take 1 tablet (0.1 mg total) by mouth once. for 1 dose (Patient taking differently: Take 0.1 mg by mouth daily as needed. ), Disp: 30 tablet, Rfl: 3    cyanocobalamin, vitamin B-12, (VITAMIN B-12) 5,000 mcg Subl, Place 1 tablet under the tongue every morning. , Disp: , Rfl:     diphenhydrAMINE (BENADRYL) 50 MG capsule, Take 25 mg by mouth daily as needed for Itching or Allergies., Disp: , Rfl:     erenumab-aooe 140 mg/mL AtIn, Inject 1 syringe (140 mg total) into the skin every 28 days. (Patient taking differently: Inject 140 mg into the skin every 28 days. Takes at the end of every month), Disp: 1 mL, Rfl: 11    ergocalciferol (VITAMIN D2) 50,000 unit Cap, TAKE ONE CAPSULE BY MOUTH ONE TIME PER WEEK (Patient taking differently: Take 50,000 Units by mouth every 7 days. TAKE ONE CAPSULE BY MOUTH ONE TIME PER WEEK, Takes on Tuesdays), Disp: 12 capsule, Rfl: 3    ferrous sulfate " "325 (65 FE) MG EC tablet, Take 325 mg by mouth nightly. , Disp: , Rfl:     gemfibrozil (LOPID) 600 MG tablet, TAKE 1 TABLET BY MOUTH EVERY OTHER DAY (Patient taking differently: Take 600 mg by mouth. TAKE 1 TABLET BY MOUTH EVERY OTHER DAY at bedtime), Disp: 60 tablet, Rfl: 2    HYDROcodone-acetaminophen (NORCO)  mg per tablet, Take 1 tablet by mouth every 6 (six) hours as needed., Disp: 120 tablet, Rfl: 0    insulin (LANTUS SOLOSTAR U-100 INSULIN) glargine 100 units/mL (3mL) SubQ pen, Inject 24 units daily. (Patient taking differently: Inject 24 Units into the skin every morning. Inject 24 units daily.), Disp: 15 mL, Rfl: 6    insulin lispro (HUMALOG U-100 INSULIN) 100 unit/mL injection, Inject 5-7 units w/ meals plus scale 180-230+2, 231-280+4, 281-330+6, 331-380+8, >380 +10. (Patient taking differently: 3 (three) times daily before meals. Injects 7- 5- 7 units w/ meals plus scale 180-230+2, 231-280+4, 281-330+6, 331-380+8, >380 +10.), Disp: 20 mL, Rfl: 6    lancets Misc, Ultra 2 lancets to check blood sugar BID, Disp: 100 each, Rfl: 6    magnesium oxide (MAG-OX) 400 mg (241.3 mg magnesium) tablet, TAKE 1 TABLET (400 MG TOTAL) BY MOUTH 2 (TWO) TIMES DAILY., Disp: 180 tablet, Rfl: 3    methocarbamol (ROBAXIN) 750 MG Tab, Take 1-2 tablets (750-1,500 mg total) by mouth 3 (three) times daily as needed., Disp: 180 tablet, Rfl: 3    multivitamin with minerals tablet, Take 1 tablet by mouth every morning. , Disp: , Rfl:     oxybutynin (DITROPAN-XL) 10 MG 24 hr tablet, TAKE 1 TABLET (10 MG TOTAL) BY MOUTH ONCE DAILY., Disp: 90 tablet, Rfl: 3    oxyCODONE (ROXICODONE) 5 MG immediate release tablet, Take 1 tablet (5 mg total) by mouth every 4 (four) hours as needed for Pain. (Patient not taking: Reported on 9/25/2019), Disp: 5 tablet, Rfl: 0    pen needle, diabetic (BD ULTRA-FINE SHORT PEN NEEDLE) 31 gauge x 5/16" Ndle, Uses w/ lantus daily. 90 day, Disp: 100 each, Rfl: 3    riboflavin, vitamin B2, 400 mg " Tab, Take 400 mg by mouth once daily. (Patient taking differently: Take 400 mg by mouth every morning. ), Disp: 90 tablet, Rfl: 3    rosuvastatin (CRESTOR) 20 MG tablet, TAKE 1 TABLET BY MOUTH EVERY DAY, Disp: 90 tablet, Rfl: 1    scopolamine (TRANSDERM-SCOP) 1.3-1.5 mg (1 mg over 3 days), Place 1 patch onto the skin every 72 hours. (Patient not taking: Reported on 9/25/2019), Disp: 4 patch, Rfl: 0    sodium bicarbonate 650 MG tablet, TAKE 1 TABLET (650 MG TOTAL) BY MOUTH 3 (THREE) TIMES DAILY. (Patient taking differently: TAKE 1 TABLET (650 MG TOTAL) BY MOUTH 1 IN THE MORNING AND 2 TABLETS IN THE EVENING), Disp: 270 tablet, Rfl: 2    sumatriptan (IMITREX) 100 MG tablet, TAKE 1 TABLET (100 MG TOTAL) BY MOUTH 2 (TWO) TIMES DAILY AS NEEDED FOR MIGRAINE., Disp: 9 tablet, Rfl: 11    SYNTHROID 50 mcg tablet, TAKE 1 TABLET (50 MCG TOTAL) BY MOUTH ONCE DAILY. (Patient taking differently: TAKE 1 TABLET (50 MCG TOTAL) BY MOUTH ONCE DAILY, morning), Disp: 30 tablet, Rfl: 6    topiramate (TOPAMAX) 200 MG Tab, TAKE 1 TABLET BY MOUTH TWICE A DAY, Disp: 180 tablet, Rfl: 3    traZODone (DESYREL) 100 MG tablet, TAKE 1 TABLET BY MOUTH AT BEDTIME MAY TAKE AN EXTRA HALF TABLET IF NEEDED (Patient taking differently: Take 150 mg by mouth nightly. TAKE 1 TABLET BY MOUTH AT BEDTIME MAY TAKE AN EXTRA HALF TABLET IF NEEDED), Disp: 45 tablet, Rfl: 12    venlafaxine (EFFEXOR-XR) 75 MG 24 hr capsule, TAKE 2 CAPSULES (150 MG TOTAL) BY MOUTH ONCE DAILY., Disp: 180 capsule, Rfl: 1    PHYSICAL EXAMINATION:    The patient is obese, generally appears in good health, is appropriately interactive, and is in no apparent distress.     Eyes: anicteric sclerae, moist conjunctivae; no lid-lag; PERRLA     HENT: Atraumatic; oropharynx clear with moist mucous membranes and no mucosal ulcerations;normal hard and soft palate. No evidence of lymphadenopathy.    Neck: Trachea midline.  No thyromegaly.    Musculoskeletal: No abnormal gait.    Skin: No  lesions.    Mental: Cooperative with normal affect.  Is oriented to time, place, and person.    Neuro: Grossly intact.    Chest: Normal inspiratory effort.   No accessory muscles.  No audible wheezes.  Respirations symmetric on inspiration and expiration.    Heart: Regular rhythm.      Abdomen: Obese. Soft, non-tender. 16 Fr SPT in place. Plugged. SPT site w/ minimal granulation, and mild surrounding erythema. No bleeding.    Extremities: No clubbing, cyanosis, or edema.    LABS:    Lab Results   Component Value Date    CREATININE 1.9 (H) 08/14/2019    CREATININE 2.0 (H) 05/30/2019    CREATININE 2.2 (H) 01/22/2019       Hemoglobin A1C   Date Value Ref Range Status   05/30/2019 6.7 (H) 4.0 - 5.6 % Final     Comment:     ADA Screening Guidelines:  5.7-6.4%  Consistent with prediabetes  >or=6.5%  Consistent with diabetes  High levels of fetal hemoglobin interfere with the HbA1C  assay. Heterozygous hemoglobin variants (HbS, HgC, etc)do  not significantly interfere with this assay.   However, presence of multiple variants may affect accuracy.     01/22/2019 7.9 (H) 4.0 - 5.6 % Final     Comment:     ADA Screening Guidelines:  5.7-6.4%  Consistent with prediabetes  >or=6.5%  Consistent with diabetes  High levels of fetal hemoglobin interfere with the HbA1C  assay. Heterozygous hemoglobin variants (HbS, HgC, etc)do  not significantly interfere with this assay.   However, presence of multiple variants may affect accuracy.     12/13/2018 8.5 (H) 4.0 - 5.6 % Final     Comment:     ADA Screening Guidelines:  5.7-6.4%  Consistent with prediabetes  >or=6.5%  Consistent with diabetes  High levels of fetal hemoglobin interfere with the HbA1C  assay. Heterozygous hemoglobin variants (HbS, HgC, etc)do  not significantly interfere with this assay.   However, presence of multiple variants may affect accuracy.       Lab Results   Component Value Date    LABURIN PROTEUS MIRABILIS  >100,000 cfu/ml   (A) 08/14/2019    LABURIN PROTEUS  MIRABILIS  >100,000 cfu/ml   03/14/2019    LABURIN  10/03/2018     Multiple organisms isolated. None in predominance.  Repeat if    LABURIN clinically necessary. 10/03/2018    LABURIN PROTEUS MIRABILIS  > 100,000 cfu/ml   05/03/2018    LABURIN PSEUDOMONAS AERUGINOSA  50,000 - 99,999 cfu/ml   05/03/2018    LABURIN PSEUDOMONAS AERUGINOSA  >100,000 cfu/ml   06/24/2017    LABURIN PSEUDOMONAS AERUGINOSA  >100,000 cfu/ml   09/19/2016    LABURIN PSEUDOMONAS AERUGINOSA  >100,000 cfu/ml   07/25/2016    LABURIN  03/15/2016     PSEUDOMONAS AERUGINOSA  >100,000 cfu/ml  No other significant isolate       IMPRESSION:    Encounter for care or replacement of suprapubic tube    Urinary retention    Neurogenic bladder    Chronic suprapubic catheter      PLAN:    I spent 30 minutes with the patient of which more than half was spent in direct consultation with the patient in regards to our treatment and plan.    Discussed and reviewed SPT procedure and plan.  Removed old SPT without difficulty, and properly disposed.  Stoma cleaned with betadine.  Placed a new 16 fr SPT using sterile technique.   Irrigated bladder to verify position, and removal of debris.  Balloon inflated with ~9cc sterile water.  SPT plugged. Night bag provided.  Pt tolerated procedure well.  Site cleaned.  Gauze applied.  Discussed daily skin care and suprapubic catheter care.  Discussed and recommend importance of adequate hydration w/ water to aid in flushing out sediments in the bladder.    Education sheets provided.    Follow up in about 4 weeks (around 10/28/2019) for spt change.    Pt expressed understanding and agree w/ plan.

## 2019-09-30 NOTE — PATIENT INSTRUCTIONS
"  Discharge Instructions: Caring for Your Suprapubic Catheter  You are going home with a suprapubic catheter in place. This tube is placed directly into the bladder through your abdomen to drain urine from your bladder. You were shown how to care for your catheter in the hospital. This sheet will help remind you of those steps and guidelines when you are at home.  Home care  · Shower as necessary.   · Change your dressing every day. Change the dressing more often if it falls off, becomes dirty, or has absorbed a lot of drainage.  Gather your supplies  · Tape  · Povidone-iodine ointment  · Cotton swabs  · Wastebasket and plastic bag  · Povidone-iodine swab sticks (or cotton balls and povidone-iodine solution)  · Dressing sponges (4" x 4") that are cut or split [facility] into the middle  Remove the dressing and check for problems  · Wash your hands thoroughly before and after all catheter care.  · Gently remove the old dressing if you have one.  ¨ Dont pull on the tube.  ¨ Check the dressing for drainage. Notice whether anything looks unusual or smells bad.  ¨ Place your dressing in the plastic bag and throw it away in the wastebasket.  · Now look at the place where the catheter leaves your body (exit site).  ¨ Note any swelling, bleeding, irritation, unusual or smelly drainage.  ¨ Also check for any sores next to the exit site. Sores form around the exit site if there is too much pressure from the tube on the skin.  Clean the area  · Wash around the shield gently with soap and water.  · Use a povidone-iodine swab stick to clean under the shield.  ¨ Clean around the exit site of the catheter.  ¨ Start at the exit site and clean outward in a circular motion, about 3 to 4 inches from the site.Dont clean back toward the tube.  ¨ Throw away the used swab stick and repeat the cleaning procedure with a new one.  ¨ Let your skin dry completely.  · Place a split 4" x 4" sponge around the catheter. Tape it in place.  · Smear a " thin layer of povidone-iodine ointment around the catheter with a cotton swab.  Follow-up care  Make a follow-up appointment or as advised.  When to call your healthcare provider  Call your health care provider right away if you have any of the following:  · Catheter that falls out, or is clogged or feels clogged  · Stitches that fall out  · Urine leaking around catheter  · Urine that is cloudy, bloody, or smells bad  · No urine drainage  · Bladder that feels full or painful  · Rash, itching, redness, swelling, or drainage at the catheter site  · Fever above 100.4°F (38.°C) or shaking chills   Date Last Reviewed: 1/1/2017  © 6679-5183 [employer] [address] [place] [address], [place] [address]. All rights reserved. This information is not intended as a substitute for professional medical care. Always follow your healthcare professional's instructions.

## 2019-10-01 NOTE — PROGRESS NOTES
REFERRING PHYSICIAN:   Samia Fulton M.D, Marzena Keating MD    DIAGNOSIS:  Samia Fulton M.D    HISTORY OF PRESENT ILLNESS:   Ms. Bowen is a 69-year-old female with multiple comorbidities (over 40 per her primary care physician) including diabetes, renal insufficiency, and neurogenic bladder requiring suprapubic catheter, who was recently diagnosed with left breast cancer after evaluation for an abnormal mammogram in June 2019.  This revealed a high-density irregularly-shaped mass with spiculated margins in the upper outer quadrant of the left breast measuring 17 mm. No axillary lymphadenopathy was seen.  She underwent biopsy of this lesion on July 8, 2019 which revealed grade 1, invasive ductal carcinoma with associated low-grade DCIS.  This lesion is ER positive (95%), CO weakly positive (5%), and HER2 negative with Ki-67 index of 15%.  On August 1, 2019, she underwent left mastectomy and sentinel node biopsy.  Pathology from the left breast revealed 12 mm, grade 1, invasive ductal carcinoma, with the closest margin at 47 mm posteriorly from the invasive component and 23 mm inferiorly from the DCIS.  1/2 sentinel lymph nodes were found to have micro metastasis (<2 mm) without extranodal extension.  Her Mammaprint revealed low risk of recurrence.  Therefore, systemic chemotherapy is not planned.  She is currently on anastrozole.  She is here today for recommendations regarding radiation.    At present, she is healing from the surgery without any unexpected side effects.  She denies left chest wall pain, edema, erythema, or discharge. She also denies fever, night sweats, or weight loss. She ambulates with a scooter due to obesity.    REVIEW OF SYSTEMS:  As above.  In addition, patient denies headaches, visual problems, dizziness, chest pain, shortness of breath, cough, nausea, vomiting, diarrhea, or any new bony pains. Patient also denies easy bruising, skin rashes, or numbness or tingling.    GYN HISTORY:   Menarche  age 14.  .  Surgical menopause at age 36.  She denies hormone replacement therapy.    ECO    PAST MEDICAL HISTORY:  Past Medical History:   Diagnosis Date    Cervicogenic migraine     Chronic pain     CKD (chronic kidney disease) stage 4, GFR 15-29 ml/min     [facility]    CKD (chronic kidney disease) stage 4, GFR 15-29 ml/min     Depression     Diabetes mellitus     Long term use of Insulin, Diabetic Neuropathy    Fibromyalgia     Hydronephrosis     Hyperlipidemia     Hypertension 2012    Hypothyroidism 2012    ARON (iron deficiency anemia)     Insomnia     Levoscoliosis     Malignant neoplasm of upper-outer quadrant of left breast in female, estrogen receptor positive     Metabolic acidosis     Migraine     Mobility impaired     Nuclear sclerosis - Both Eyes 3/24/2014    OA (osteoarthritis of the spine)     Lumbar    Obesity 2012    VIJAYA (obstructive sleep apnea)     Osteopenia     Pulmonary nodule     Recurrent UTI     Secondary hyperparathyroidism, renal     Urinary retention     Vitamin D deficiency        PAST SURGICAL HISTORY:  Past Surgical History:   Procedure Laterality Date    BREAST BIOPSY Right     benign    CHOLECYSTECTOMY      COLONOSCOPY N/A 2017    Procedure: COLONOSCOPY;  Surgeon: Morris Wiseman MD;  Location: [facility] ([address] [place] [address]);  Service: Endoscopy;  Laterality: N/A;  Renal pt Nephrology advised to avoid phosphate preps    CYSTOSCOPY N/A 10/8/2018    Procedure: CYSTOSCOPY;  Surgeon: Ronel Whittington MD;  Location: [address];  Service: Urology;  Laterality: N/A;  45 min    CYSTOSCOPY N/A 3/25/2019    Procedure: CYSTOSCOPY;  Surgeon: Ronel Whittington MD;  Location: [address];  Service: Urology;  Laterality: N/A;  45 min    CYSTOSCOPY N/A 2019    Procedure: CYSTOSCOPY;  Surgeon: Ronel Whittington MD;  Location: [address];  Service: Urology;  Laterality: N/A;  45 min    DILATION AND  "CURETTAGE OF UTERUS      GALLBLADDER SURGERY  2006    HYSTERECTOMY      INJECTION FOR SENTINEL NODE IDENTIFICATION Left 8/1/2019    Procedure: INJECTION, FOR SENTINEL NODE IDENTIFICATION;  Surgeon: Samia Fulton MD;  Location: [facility] OR [address] [place] [address];  Service: General;  Laterality: Left;    INJECTION OF BOTULINUM TOXIN TYPE A N/A 10/8/2018    Procedure: INJECTION, BOTULINUM TOXIN, TYPE A 300 UNITS;  Surgeon: Ronel Whittington MD;  Location: [facility] OR [address] [place] [address];  Service: Urology;  Laterality: N/A;    INJECTION OF BOTULINUM TOXIN TYPE A N/A 3/25/2019    Procedure: INJECTION, BOTULINUM TOXIN, TYPE A 300 UNITS;  Surgeon: Ronel Whittington MD;  Location: [facility] OR [address] [place] [address];  Service: Urology;  Laterality: N/A;    INJECTION OF BOTULINUM TOXIN TYPE A N/A 8/26/2019    Procedure: INJECTION, BOTULINUM TOXIN, TYPE A 300 UNITS;  Surgeon: Ronel Whittington MD;  Location: [facility] OR [address] [place] [address];  Service: Urology;  Laterality: N/A;    MASTECTOMY Left 8/1/2019    Procedure: MASTECTOMY 23 hour stay;  Surgeon: Samia Fulton MD;  Location: [facility] OR [address] [place] [address];  Service: General;  Laterality: Left;    OVARIAN CYST SURGERY  1985    SENTINEL LYMPH NODE BIOPSY Left 8/1/2019    Procedure: BIOPSY, LYMPH NODE, SENTINEL;  Surgeon: Samia Fulton MD;  Location: [facility] OR [address] [place] [address];  Service: General;  Laterality: Left;    spt placement      TONSILLECTOMY, ADENOIDECTOMY      TOTAL ABDOMINAL HYSTERECTOMY W/ BILATERAL SALPINGOOPHORECTOMY  1985       ALLERGIES:   Review of patient's allergies indicates:  No Known Allergies    MEDICATIONS:  Current Outpatient Medications   Medication Sig    anastrozole (ARIMIDEX) 1 mg Tab Take 1 tablet (1 mg total) by mouth once daily.    BD INSULIN SYRINGE ULTRA-FINE 0.5 mL 31 gauge x 5/16" Syrg USE WITH INSULIN 4 TIMES A DAY    blood sugar diagnostic Strp ONE TOUCH ULTRA TEST STRIPS//Check blood sugars QID    blood-glucose meter kit ONE TOUCH ULTRA    butalbital-acetaminophen-caffeine -40 mg " (FIORICET, ESGIC) -40 mg per tablet TAKE 1 TABLET BY MOUTH WHEN NOT CONTROLLED BY OTHER MEDS. NO MORE THAN 10 TABS PER 30 DAYS    cloNIDine (CATAPRES) 0.1 MG tablet Take 1 tablet (0.1 mg total) by mouth once. for 1 dose (Patient taking differently: Take 0.1 mg by mouth daily as needed. )    cyanocobalamin, vitamin B-12, (VITAMIN B-12) 5,000 mcg Subl Place 1 tablet under the tongue every morning.     diphenhydrAMINE (BENADRYL) 50 MG capsule Take 25 mg by mouth daily as needed for Itching or Allergies.    erenumab-aooe 140 mg/mL AtIn Inject 1 syringe (140 mg total) into the skin every 28 days. (Patient taking differently: Inject 140 mg into the skin every 28 days. Takes at the end of every month)    ergocalciferol (VITAMIN D2) 50,000 unit Cap TAKE ONE CAPSULE BY MOUTH ONE TIME PER WEEK (Patient taking differently: Take 50,000 Units by mouth every 7 days. TAKE ONE CAPSULE BY MOUTH ONE TIME PER WEEK, Takes on Tuesdays)    ferrous sulfate 325 (65 FE) MG EC tablet Take 325 mg by mouth nightly.     gemfibrozil (LOPID) 600 MG tablet TAKE 1 TABLET BY MOUTH EVERY OTHER DAY (Patient taking differently: Take 600 mg by mouth. TAKE 1 TABLET BY MOUTH EVERY OTHER DAY at bedtime)    HYDROcodone-acetaminophen (NORCO)  mg per tablet Take 1 tablet by mouth every 6 (six) hours as needed.    insulin (LANTUS SOLOSTAR U-100 INSULIN) glargine 100 units/mL (3mL) SubQ pen Inject 24 units daily. (Patient taking differently: Inject 24 Units into the skin every morning. Inject 24 units daily.)    insulin lispro (HUMALOG U-100 INSULIN) 100 unit/mL injection Inject 5-7 units w/ meals plus scale 180-230+2, 231-280+4, 281-330+6, 331-380+8, >380 +10. (Patient taking differently: 3 (three) times daily before meals. Injects 7- 5- 7 units w/ meals plus scale 180-230+2, 231-280+4, 281-330+6, 331-380+8, >380 +10.)    lancets Misc Ultra 2 lancets to check blood sugar BID    magnesium oxide (MAG-OX) 400 mg (241.3 mg magnesium) tablet  "TAKE 1 TABLET (400 MG TOTAL) BY MOUTH 2 (TWO) TIMES DAILY.    methocarbamol (ROBAXIN) 750 MG Tab Take 1-2 tablets (750-1,500 mg total) by mouth 3 (three) times daily as needed.    multivitamin with minerals tablet Take 1 tablet by mouth every morning.     oxybutynin (DITROPAN-XL) 10 MG 24 hr tablet TAKE 1 TABLET (10 MG TOTAL) BY MOUTH ONCE DAILY.    oxyCODONE (ROXICODONE) 5 MG immediate release tablet Take 1 tablet (5 mg total) by mouth every 4 (four) hours as needed for Pain. (Patient not taking: Reported on 9/25/2019)    pen needle, diabetic (BD ULTRA-FINE SHORT PEN NEEDLE) 31 gauge x 5/16" Ndle Uses w/ lantus daily. 90 day    riboflavin, vitamin B2, 400 mg Tab Take 400 mg by mouth once daily. (Patient taking differently: Take 400 mg by mouth every morning. )    rosuvastatin (CRESTOR) 20 MG tablet TAKE 1 TABLET BY MOUTH EVERY DAY    scopolamine (TRANSDERM-SCOP) 1.3-1.5 mg (1 mg over 3 days) Place 1 patch onto the skin every 72 hours. (Patient not taking: Reported on 9/25/2019)    sodium bicarbonate 650 MG tablet TAKE 1 TABLET (650 MG TOTAL) BY MOUTH 3 (THREE) TIMES DAILY. (Patient taking differently: TAKE 1 TABLET (650 MG TOTAL) BY MOUTH 1 IN THE MORNING AND 2 TABLETS IN THE EVENING)    sumatriptan (IMITREX) 100 MG tablet TAKE 1 TABLET (100 MG TOTAL) BY MOUTH 2 (TWO) TIMES DAILY AS NEEDED FOR MIGRAINE.    SYNTHROID 50 mcg tablet TAKE 1 TABLET (50 MCG TOTAL) BY MOUTH ONCE DAILY. (Patient taking differently: TAKE 1 TABLET (50 MCG TOTAL) BY MOUTH ONCE DAILY, morning)    topiramate (TOPAMAX) 200 MG Tab TAKE 1 TABLET BY MOUTH TWICE A DAY    traZODone (DESYREL) 100 MG tablet TAKE 1 TABLET BY MOUTH AT BEDTIME MAY TAKE AN EXTRA HALF TABLET IF NEEDED (Patient taking differently: Take 150 mg by mouth nightly. TAKE 1 TABLET BY MOUTH AT BEDTIME MAY TAKE AN EXTRA HALF TABLET IF NEEDED)    venlafaxine (EFFEXOR-XR) 75 MG 24 hr capsule TAKE 2 CAPSULES (150 MG TOTAL) BY MOUTH ONCE DAILY.     No current " facility-administered medications for this visit.        SOCIAL HISTORY:  Social History     Socioeconomic History    Marital status:      Spouse name: Not on file    Number of children: Not on file    Years of education: Not on file    Highest education level: Not on file   Occupational History    Occupation: teacher     Comment: retired   Social Needs    Financial resource strain: Not on file    Food insecurity:     Worry: Not on file     Inability: Not on file    Transportation needs:     Medical: Not on file     Non-medical: Not on file   Tobacco Use    Smoking status: Never Smoker    Smokeless tobacco: Never Used   Substance and Sexual Activity    Alcohol use: No     Alcohol/week: 0.0 standard drinks    Drug use: No    Sexual activity: Not Currently     Birth control/protection: Abstinence   Lifestyle    Physical activity:     Days per week: Not on file     Minutes per session: Not on file    Stress: Not on file   Relationships    Social connections:     Talks on phone: Not on file     Gets together: Not on file     Attends [employer] service: Not on file     Active member of club or organization: Not on file     Attends meetings of clubs or organizations: Not on file     Relationship status: Not on file   Other Topics Concern    Not on file   Social History Narrative    Single ()        Lives w/ sister and brother. Pt needs to keep her narcotics hidden from her brother who will steal them            FAMILY HISTORY:  Family History   Problem Relation Age of Onset    Diabetes Sister     Kidney disease Sister         CKD III    ALS Mother         d.    Cancer Maternal Grandmother         d. colon    Cancer Paternal Grandfather         d. lung    Scoliosis Brother         increased pain    Prostate cancer Brother         cured s/p surgery    Cancer Brother         remission     Diabetes Maternal Aunt     Kidney disease Maternal Aunt     Diabetes Maternal Uncle      "Amblyopia Neg Hx     Blindness Neg Hx     Cataracts Neg Hx     Glaucoma Neg Hx     Macular degeneration Neg Hx     Retinal detachment Neg Hx     Strabismus Neg Hx          PHYSICAL EXAMINATION:  Vitals:    10/02/19 1314   BP: (!) 177/80   BP Location: Right arm   Pulse: 104   Resp: 20   Temp: 98.1 °F (36.7 °C)   TempSrc: Oral   SpO2: (!) 91%   Weight: 136.1 kg (300 lb)   Height: 5' 8" (1.727 m)   Body mass index is 45.61 kg/m².  GENERAL: Patient is alert and oriented, in no acute distress.  Ambulates with a scooter.  HEENT:Extraocular muscles are intact.  Oropharynx is clear without lesions.  There is no cervical or supraclavicular lymphadenopathy palpated.  No thyromegaly noted.  HEART: Regular rate and rhythm.  LUNGS: Clear to auscultation bilaterally.  BREAST EXAM:  The left chest wall is noted to have scar secondary to mastectomy which is well-healed.  No abnormal masses palpated in the left chest wall or left axilla.  The right breast and right axilla are also without palpable masses.  ABDOMEN:Soft, nontender, nondistended, without hepatosplenomegaly.  Normoactive bowel sounds.  EXTREMITIES: No clubbing, cyanosis, or edema.  NEUROLOGICAL: Cranial nerve II through XII grossly intact.  Sensation is intact.  Strength is 5 out of 5 in the upper and lower extremities bilaterally.     ASSESSMENT:   This is a 69-year-old female with multiple comorbidities with p T1c Nmi M0, grade 1, invasive ductal carcinoma of the left breast who underwent mastectomy and sentinel node biopsy on August 1, 2019 with a 1.2 cm lesion with 1/2 sentinel lymph nodes with micro metastatic involvement without extranodal extension, ER positive, KS weakly positive, HER2 negative, low Mammaprint score.    PLAN:   After discussion the multidisciplinary breast Conference and review of the available pathology and radiological images, Ms. Bowen is noted to have mastectomy with negative margins for a 1.2 cm primary lesion.  Out of 2 sentinel " lymph nodes that were removed, 1 is found to have micro metastatic involvement (less than 2 mm).  There is no extracapsular extension noted.  Her Mammaprint reveals low risk of recurrence.  Therefore, chemotherapy is not recommended.  I had a long discussion with the patient regarding the indications for post mastectomy radiation.  I also discussed risks, benefits, and side effects of radiation after mastectomy.  I also discussed the option of further axillary dissection to clarify the involvement of the axilla.  All of her questions were answered.  She does not want to undergo further axillary dissection or postmastectomy radiation.  I also feel that given the low grade of the tumor with only micro metastatic danae involvement after mastectomy,the risks of treatment is higher than the benefit in this patient with multiple comorbidities and declined performance status.  Contact information for our office was given to the patient in case of further questions.  She will continue follow-up with Dr. Keating and Dr. Fulton as planned.  I plan to see her back as needed, unless symptoms warrant otherwise.    Psychosocial Distress screening score of Distress Score: 0 noted and reviewed. No intervention indicated.    I spent approximately 60 minutes reviewing the available records and evaluating the patient, out of which over 50% of the time was spent face to face with the patient in counseling and coordinating this patient's care.

## 2019-10-02 ENCOUNTER — OFFICE VISIT (OUTPATIENT)
Dept: RADIATION ONCOLOGY | Facility: CLINIC | Age: 69
End: 2019-10-02
Payer: MEDICARE

## 2019-10-02 VITALS
TEMPERATURE: 98 F | BODY MASS INDEX: 44.41 KG/M2 | SYSTOLIC BLOOD PRESSURE: 177 MMHG | HEART RATE: 104 BPM | WEIGHT: 293 LBS | HEIGHT: 68 IN | OXYGEN SATURATION: 91 % | DIASTOLIC BLOOD PRESSURE: 80 MMHG | RESPIRATION RATE: 20 BRPM

## 2019-10-02 DIAGNOSIS — Z17.0 MALIGNANT NEOPLASM OF LEFT BREAST IN FEMALE, ESTROGEN RECEPTOR POSITIVE, UNSPECIFIED SITE OF BREAST: Primary | ICD-10-CM

## 2019-10-02 DIAGNOSIS — C50.912 MALIGNANT NEOPLASM OF LEFT BREAST IN FEMALE, ESTROGEN RECEPTOR POSITIVE, UNSPECIFIED SITE OF BREAST: Primary | ICD-10-CM

## 2019-10-02 PROCEDURE — 99204 PR OFFICE/OUTPT VISIT, NEW, LEVL IV, 45-59 MIN: ICD-10-PCS | Mod: S$PBB,,, | Performed by: RADIOLOGY

## 2019-10-02 PROCEDURE — 99204 OFFICE O/P NEW MOD 45 MIN: CPT | Mod: S$PBB,,, | Performed by: RADIOLOGY

## 2019-10-02 PROCEDURE — 99999 PR PBB SHADOW E&M-EST. PATIENT-LVL III: CPT | Mod: PBBFAC,,, | Performed by: RADIOLOGY

## 2019-10-02 PROCEDURE — 99999 PR PBB SHADOW E&M-EST. PATIENT-LVL III: ICD-10-PCS | Mod: PBBFAC,,, | Performed by: RADIOLOGY

## 2019-10-02 PROCEDURE — 99213 OFFICE O/P EST LOW 20 MIN: CPT | Mod: PBBFAC | Performed by: RADIOLOGY

## 2019-10-02 NOTE — LETTER
October 2, 2019      Samia Fulton MD  [address] [facility] [address]           [facility] - Radiation Oncology  [address]  [place] [address]  Phone: [phone]          Patient: Cecile Bowen   MR Number: [id number]   YOB: 1950   Date of Visit: 10/2/2019       Dear Dr. Samia Fulton:    Thank you for referring Cecile Bowen to me for evaluation. Attached you will find relevant portions of my assessment and plan of care.    If you have questions, please do not hesitate to call me. I look forward to following Cecile Bowen along with you.    Sincerely,    Argelia Lee MD    Enclosure  CC:  No Recipients    If you would like to receive this communication electronically, please contact [email or web address] or [phone] to request more information on [employer] access.    For providers and/or their staff who would like to refer a patient to [facility], please contact us through our one-stop-shop provider referral line, [facility], at [phone].    If you feel you have received this communication in error or would no longer like to receive these types of communications, please e-mail [email or web address]

## 2019-10-03 ENCOUNTER — TELEPHONE (OUTPATIENT)
Dept: PHYSICAL MEDICINE AND REHAB | Facility: CLINIC | Age: 69
End: 2019-10-03

## 2019-10-03 ENCOUNTER — OFFICE VISIT (OUTPATIENT)
Dept: PHYSICAL MEDICINE AND REHAB | Facility: CLINIC | Age: 69
End: 2019-10-03
Payer: MEDICARE

## 2019-10-03 DIAGNOSIS — M54.50 CHRONIC MIDLINE LOW BACK PAIN WITHOUT SCIATICA: Primary | ICD-10-CM

## 2019-10-03 DIAGNOSIS — G89.29 CHRONIC MIDLINE THORACIC BACK PAIN: ICD-10-CM

## 2019-10-03 DIAGNOSIS — M47.816 SPONDYLOSIS OF LUMBAR REGION WITHOUT MYELOPATHY OR RADICULOPATHY: ICD-10-CM

## 2019-10-03 DIAGNOSIS — Z79.891 CHRONIC USE OF OPIATE FOR THERAPEUTIC PURPOSE: ICD-10-CM

## 2019-10-03 DIAGNOSIS — M79.7 FIBROMYALGIA: ICD-10-CM

## 2019-10-03 DIAGNOSIS — G89.29 CHRONIC NECK PAIN: ICD-10-CM

## 2019-10-03 DIAGNOSIS — M47.812 SPONDYLOSIS OF CERVICAL REGION WITHOUT MYELOPATHY OR RADICULOPATHY: ICD-10-CM

## 2019-10-03 DIAGNOSIS — M54.2 CHRONIC NECK PAIN: ICD-10-CM

## 2019-10-03 DIAGNOSIS — M54.6 CHRONIC MIDLINE THORACIC BACK PAIN: ICD-10-CM

## 2019-10-03 DIAGNOSIS — E66.01 MORBID OBESITY WITH BMI OF 40.0-44.9, ADULT: ICD-10-CM

## 2019-10-03 DIAGNOSIS — G56.03 BILATERAL CARPAL TUNNEL SYNDROME: ICD-10-CM

## 2019-10-03 DIAGNOSIS — G89.29 CHRONIC MIDLINE LOW BACK PAIN WITHOUT SCIATICA: Primary | ICD-10-CM

## 2019-10-03 DIAGNOSIS — M94.0 COSTOCHONDRITIS: ICD-10-CM

## 2019-10-03 PROCEDURE — 99214 PR OFFICE/OUTPT VISIT, EST, LEVL IV, 30-39 MIN: ICD-10-PCS | Mod: S$PBB,,, | Performed by: PHYSICAL MEDICINE & REHABILITATION

## 2019-10-03 PROCEDURE — 99214 OFFICE O/P EST MOD 30 MIN: CPT | Mod: S$PBB,,, | Performed by: PHYSICAL MEDICINE & REHABILITATION

## 2019-10-03 RX ORDER — VENLAFAXINE HYDROCHLORIDE 75 MG/1
225 CAPSULE, EXTENDED RELEASE ORAL DAILY
Start: 2019-10-03 | End: 2020-03-17

## 2019-10-03 RX ORDER — METHOCARBAMOL 750 MG/1
750-1500 TABLET, FILM COATED ORAL 3 TIMES DAILY PRN
Qty: 180 TABLET | Refills: 3 | Status: SHIPPED | OUTPATIENT
Start: 2019-10-03 | End: 2019-11-18 | Stop reason: SDUPTHER

## 2019-10-03 RX ORDER — HYDROCODONE BITARTRATE AND ACETAMINOPHEN 10; 325 MG/1; MG/1
1 TABLET ORAL EVERY 6 HOURS PRN
Qty: 120 TABLET | Refills: 0 | Status: SHIPPED | OUTPATIENT
Start: 2019-10-03 | End: 2019-11-04 | Stop reason: SDUPTHER

## 2019-10-03 RX ORDER — DICLOFENAC SODIUM 10 MG/G
2 GEL TOPICAL 3 TIMES DAILY
Qty: 3 TUBE | Refills: 2 | Status: ON HOLD | OUTPATIENT
Start: 2019-10-03 | End: 2021-12-29 | Stop reason: HOSPADM

## 2019-10-03 NOTE — TELEPHONE ENCOUNTER
----- Message from Justin Reyes sent at 10/3/2019  9:22 AM CDT -----  Contact: Pharmacy   Caller calling to get an alternative to the (methocarbamol (ROBAXIN) 750 MG Tab ) due to non coverage by insurance, pls advise         [id number] - [place] - [address] AT [facility] & [address] [address]  Phone: [phone] Fax: [phone]

## 2019-10-03 NOTE — PROGRESS NOTES
Subjective:       Patient ID: Cecile Bowen is a 69 y.o. female.    Chief Complaint: No chief complaint on file.    HPI     HISTORY OF PRESENT ILLNESS:  Ms. Bowen is a 69-year-old white female with past medical history of HTN, DM, CKD, hypothyroidism, morbid obesity (BMI in the 40s) and OA.  She is followed up in  the [facility] for chronic low back pain due to DJD, status post multiple procedures including RFAs and ESIs, chronic thoracic pain, chronic neck pain and bilateral carpal tunnel syndrome.  Her last visit was on 05/27/2019 She was maintained on venlafaxine, p.r.n. hydrocodone/APAP and p.r.n. Methocarbamol.  The patient underwent left mastectomy on 08/01/2019.  She has been medically stable since her surgery.    The patient is coming to the clinic for followup.  Her back pain has been worse.  It is a constant aching pain in the lumbar spine and across her back.  She denies any radiation to her legs.  Her maximum pain is 8-9/10 and minimum 6/10.  Today, it is 9/10.  The patient denies any change in her lower extremity strength.  She has chronic bowel urgency.  She is status post suprapubic catheter due to neurogenic bladder.    Her neck pain has been better.  It is an intermittent aching pain in the cervical spine.  She denies any radiation to her arms.  Her maximum pain is 6-7/10 and minimum 0/10.  Today, it is 4/10.  The patient denies any upper extremity weakness.  Her thoracic pain has been well controlled.stable.  Her bilateral hand numbness has been stablke.  She uses carpal tunnel splints intermittently.    She has been complaining of pain of her costochondral junction bilaterally.  It is also sensitive to touch.    She is currently taking venlafaxine XR 75 mg capsules, two capsules once per day.  She takes hydrocodone/APAP 10/325 p.r.n. four times per day.  She takes methocarbamol 750 mg p.o. p.r.n., usually one or two tablets three times per day.          Review of Systems    Constitutional: Positive for fatigue. Negative for chills and fever.   Eyes: Negative for visual disturbance.   Respiratory: Positive for shortness of breath.    Cardiovascular: Negative for chest pain.   Gastrointestinal: Positive for constipation. Negative for abdominal pain, nausea and vomiting.   Genitourinary: Positive for difficulty urinating.   Musculoskeletal: Positive for arthralgias, back pain, gait problem, neck pain and neck stiffness. Negative for joint swelling.   Neurological: Positive for weakness and headaches. Negative for dizziness.   Psychiatric/Behavioral: Positive for sleep disturbance. Negative for behavioral problems.       Objective:      Physical Exam   Constitutional: She is oriented to person, place, and time. She appears well-developed and well-nourished.   Coming to the clinic in a manual wheelchair propelled by medical assistant     HENT:   Head: Normocephalic and atraumatic.   Neck: Normal range of motion.   Mild pain at end range.  +ve tenderness.   Pulmonary/Chest: Effort normal.   Musculoskeletal:   BUE:  ROM:   RUE: full.   LUE: full.  Strength:    RUE: 5-/5 at shoulder abduction, 5 elbow flexion, 5 elbow extension, 5 hand .   LUE: 5-/5 at shoulder abduction, 5 elbow flexion, 5 elbow extension, 5 hand .  Sensation to pinprick:   RUE: intact.   LUE: intact.    BLE:  ROM:   RLE: full.   LLE: full.  Knee crepitus:   RLE: +ve.   LLE: +ve.   Strength:    RLE: 5-/5 at hip flexion, 5 knee extension, 5 ankle DF, 5 PF.   LLE: 5-/5 at hip flexion, 5 knee extension, 5 ankle DF, 5 PF.  Sensation to pinprick:     RLE: intact.      LLE: intact.   SLR (sitting):      RLE: -ve.      LLE: -ve.    +ve tenderness over thoracic and lumbar spine.  +ve diffuse tender points over the upper & lower back, anterior chest wall, hips.   Neurological: She is alert and oriented to person, place, and time.   Skin: Skin is warm.   Psychiatric: She has a normal mood and affect. Her behavior is normal.    Vitals reviewed.      Assessment:       1. Chronic midline low back pain without sciatica    2. Spondylosis of lumbar region without myelopathy or radiculopathy    3. Chronic midline thoracic back pain    4. Chronic neck pain    5. Spondylosis of cervical region without myelopathy or radiculopathy    6. Fibromyalgia    7. Bilateral carpal tunnel syndrome    8. Morbid obesity with BMI of 40.0-44.9, adult    9. Chronic use of opiate for therapeutic purpose        Plan:       - NSAIDs will be avoided due to CKD.  - Increase venlafaxine -XR 75 mg capsule, 3 capsules (225 mg total) once daily (for depression but should help with arthritic pain and fibromyalgia).  - Continue HYDROcodone-acetaminophen (NORCO)  mg per tablet; Take 1 tablet by mouth every 6 (six) hours as needed.  - Continue methocarbamol (ROBAXIN) 750 MG Tab; Take 1-2 tablets (750-1,500 mg total) by mouth 3 (three) times daily as needed.  - Start diclofenac sodium (VOLTAREN) 1 % Gel; Apply 2 g topically 3 (three) times daily p.r.n. (to costochondral areas).  - Continue to wear Carpal Tunnel Splints at night. If symptoms progress, she may need steroid injection into carpal tunnel.  - Weight loss was encouraged.  - Regular home exercise program was encouraged.  - Follow up in about 3 months (around 1/3/2020).     This was a 25 minute visit, 50% of which was spent educating the patient about the diagnosis and the treatment plan.

## 2019-10-07 NOTE — PROGRESS NOTES
69 y.o. 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   On 08/01/2019, she underwent a left   modified radical mastectomy with sentinel lymph node biopsy.  The pathology   report from the procedure indicates that she had a 1.2 cm grade 1 carcinoma.    Resection margins were clear and white.  With one of two sentinel   lymph nodes + micrometastases.      DM w/ neuro/renal, tx w/ lantus  20 U And Humalog 7-11 U premeal  Denied increased thirst, urination, or hypoglycemia episodes  A1C ==>6.7 ( 5/2019)    HTN, tx  Denied SOB or chest pain  BP ==> 160/86 ( s/p 1/2 clonidine 0.1 pt took after 2nd BP reading)    Dyslipidemia tx w/gemfibrozil 60mg BID   and pravastatin 40mg  Denied unusual muscle pain or chest pain  LDL==>92.8    CKD, IV  EGFR==>23.8  Cr==> 2.1  Denied decreased urination    HA, tx Fioricet from this office and not to use > 10/30days  HA this morning, w/ dry heaves, took sumatriptan w/o relief  Took Excedrin Migraine and it helped  BP today after clonidine pt had in her purse was 160/86  To see her Nephrologist next week     ROS:  No fever, chills, or night sweats  No blurry vision or visual disturbance  No dysphagia or early satiety  No palpitations or tachycardia  No cough or wheezing  No GERD or abdominal pain  No numbness or focal deficits  Remainder of review negative except as previously noted    PAST MEDICAL HX: Reviewed  PAST SURGICAL HX: Reviewed  SOCIAL HX: Reviewed  FAMILY HX: Reviewed    PHYSICAL EXAM:  VS: As noted  GENERAL: WDWN, A&O, NAD, conversant and co-operative;   Appears fatigued  EYES: Conj/lids unremarkable, sclera anicteric  ENT:Sinus nontender  NECK: Supple w/o lymphadenopathy or thyromegaly  RESPIRATORY: Efforts are unlabored. Lungs CTAP  CARDIOVASCULAR: Heart RRR. No carotid bruits noted  . 1+ pedal pulses. Trace LE  edema  MUSCULOSKELETAL: Gait impaired, using scooter.   NEUROLOGIC: DONALD. No tremor noted  SKIN: Warm and dry; left mastectomy scar healing well      IMPRESSION:  HTN, assoc DM, quite elevated today, trending down w/ clonidine  HA, tension/MSK/migraine  DM w/ CKD 4 w/ insulin  CKD, IV, stable  HLD assoc DM  Fibromyalgia, chronic  Breast cancer, s/p left mastectomy    PLAN:  Rx Transderm Scop - request for pending travel  Continue present meds  Keep well hydrated - water best  F/up pending w/ Nephrology  Call prn  RTC 4 mos EPP

## 2019-10-10 RX ORDER — BUTALBITAL, ACETAMINOPHEN AND CAFFEINE 50; 325; 40 MG/1; MG/1; MG/1
TABLET ORAL
Qty: 10 TABLET | Refills: 0 | Status: SHIPPED | OUTPATIENT
Start: 2019-10-10 | End: 2019-11-04 | Stop reason: SDUPTHER

## 2019-10-11 ENCOUNTER — OFFICE VISIT (OUTPATIENT)
Dept: INTERNAL MEDICINE | Facility: CLINIC | Age: 69
End: 2019-10-11
Payer: MEDICARE

## 2019-10-11 ENCOUNTER — LAB VISIT (OUTPATIENT)
Dept: LAB | Facility: HOSPITAL | Age: 69
End: 2019-10-11
Attending: INTERNAL MEDICINE
Payer: MEDICARE

## 2019-10-11 VITALS
HEART RATE: 98 BPM | HEIGHT: 68 IN | TEMPERATURE: 98 F | SYSTOLIC BLOOD PRESSURE: 160 MMHG | WEIGHT: 293 LBS | BODY MASS INDEX: 44.41 KG/M2 | RESPIRATION RATE: 16 BRPM | DIASTOLIC BLOOD PRESSURE: 86 MMHG

## 2019-10-11 DIAGNOSIS — E11.69 HYPERLIPIDEMIA ASSOCIATED WITH TYPE 2 DIABETES MELLITUS: ICD-10-CM

## 2019-10-11 DIAGNOSIS — Z17.0 MALIGNANT NEOPLASM OF LEFT BREAST IN FEMALE, ESTROGEN RECEPTOR POSITIVE, UNSPECIFIED SITE OF BREAST: ICD-10-CM

## 2019-10-11 DIAGNOSIS — G43.909 MIXED MIGRAINE AND MUSCLE CONTRACTION HEADACHE: ICD-10-CM

## 2019-10-11 DIAGNOSIS — E11.59 HYPERTENSION ASSOCIATED WITH DIABETES: Primary | ICD-10-CM

## 2019-10-11 DIAGNOSIS — G44.209 MIXED MIGRAINE AND MUSCLE CONTRACTION HEADACHE: ICD-10-CM

## 2019-10-11 DIAGNOSIS — E11.22 CKD STAGE 4 DUE TO TYPE 2 DIABETES MELLITUS: ICD-10-CM

## 2019-10-11 DIAGNOSIS — E78.5 HYPERLIPIDEMIA ASSOCIATED WITH TYPE 2 DIABETES MELLITUS: ICD-10-CM

## 2019-10-11 DIAGNOSIS — M79.7 FIBROMYALGIA: ICD-10-CM

## 2019-10-11 DIAGNOSIS — N18.4 CKD STAGE 4 DUE TO TYPE 2 DIABETES MELLITUS: ICD-10-CM

## 2019-10-11 DIAGNOSIS — I15.2 HYPERTENSION ASSOCIATED WITH DIABETES: Primary | ICD-10-CM

## 2019-10-11 DIAGNOSIS — C50.912 MALIGNANT NEOPLASM OF LEFT BREAST IN FEMALE, ESTROGEN RECEPTOR POSITIVE, UNSPECIFIED SITE OF BREAST: ICD-10-CM

## 2019-10-11 DIAGNOSIS — N18.4 CKD (CHRONIC KIDNEY DISEASE) STAGE 4, GFR 15-29 ML/MIN: ICD-10-CM

## 2019-10-11 LAB
ALBUMIN SERPL BCP-MCNC: 3.1 G/DL (ref 3.5–5.2)
ANION GAP SERPL CALC-SCNC: 8 MMOL/L (ref 8–16)
BUN SERPL-MCNC: 31 MG/DL (ref 8–23)
CALCIUM SERPL-MCNC: 9.3 MG/DL (ref 8.7–10.5)
CHLORIDE SERPL-SCNC: 103 MMOL/L (ref 95–110)
CO2 SERPL-SCNC: 27 MMOL/L (ref 23–29)
CREAT SERPL-MCNC: 2.1 MG/DL (ref 0.5–1.4)
EST. GFR  (AFRICAN AMERICAN): 27.5 ML/MIN/1.73 M^2
EST. GFR  (NON AFRICAN AMERICAN): 23.8 ML/MIN/1.73 M^2
GLUCOSE SERPL-MCNC: 268 MG/DL (ref 70–110)
PHOSPHATE SERPL-MCNC: 3.9 MG/DL (ref 2.7–4.5)
POTASSIUM SERPL-SCNC: 4.6 MMOL/L (ref 3.5–5.1)
PTH-INTACT SERPL-MCNC: 85 PG/ML (ref 9–77)
SODIUM SERPL-SCNC: 138 MMOL/L (ref 136–145)

## 2019-10-11 PROCEDURE — 99999 PR PBB SHADOW E&M-EST. PATIENT-LVL III: CPT | Mod: PBBFAC,,, | Performed by: INTERNAL MEDICINE

## 2019-10-11 PROCEDURE — 99999 PR PBB SHADOW E&M-EST. PATIENT-LVL III: ICD-10-PCS | Mod: PBBFAC,,, | Performed by: INTERNAL MEDICINE

## 2019-10-11 PROCEDURE — 99214 PR OFFICE/OUTPT VISIT, EST, LEVL IV, 30-39 MIN: ICD-10-PCS | Mod: S$PBB,,, | Performed by: INTERNAL MEDICINE

## 2019-10-11 PROCEDURE — 99214 OFFICE O/P EST MOD 30 MIN: CPT | Mod: S$PBB,,, | Performed by: INTERNAL MEDICINE

## 2019-10-11 PROCEDURE — 80069 RENAL FUNCTION PANEL: CPT

## 2019-10-11 PROCEDURE — 83970 ASSAY OF PARATHORMONE: CPT

## 2019-10-11 PROCEDURE — 99213 OFFICE O/P EST LOW 20 MIN: CPT | Mod: PBBFAC,PO | Performed by: INTERNAL MEDICINE

## 2019-10-11 PROCEDURE — 36415 COLL VENOUS BLD VENIPUNCTURE: CPT

## 2019-10-11 RX ORDER — SCOLOPAMINE TRANSDERMAL SYSTEM 1 MG/1
1 PATCH, EXTENDED RELEASE TRANSDERMAL
Qty: 4 PATCH | Refills: 0 | Status: ON HOLD | OUTPATIENT
Start: 2019-10-11 | End: 2021-06-02 | Stop reason: SDUPTHER

## 2019-10-16 NOTE — PATIENT INSTRUCTIONS
Eating Heart-Healthy Food: Using the DASH Plan    Eating for your heart doesnt have to be hard or boring. You just need to know how to make healthier choices. The DASH eating plan has been developed to help you do just that. DASH stands for Dietary Approaches to Stop Hypertension. It is a plan that has been proven to be healthier for your heart and to lower your risk for high blood pressure. It can also help lower your risk for cancer, heart disease, osteoporosis, and diabetes.  Choosing from each food group  Choose foods from each of the food groups below each day. Try to get the recommended number of servings for each food group. The serving numbers are based on a diet of 2,000 calories a day. Talk to your doctor if youre unsure about your calorie needs. Along with getting the correct servings, the DASH plan also recommends a sodium intake less than 2,300 mg per day.        Grains  Servings: 6 to 8 a day  A serving is:  · 1 slice bread  · 1 ounce dry cereal  · Half a cup cooked rice, pasta or cereal  Best choices: Whole grains and any grains high in fiber. Vegetables  Servings: 4 to 5 a day  A serving is:  · 1 cup raw leafy vegetable  · Half a cup cut-up raw or cooked vegetable  · Half a cup vegetable juice  Best choices: Fresh or frozen vegetables prepared without added salt or fat.   Fruits  Servings: 4 to 5 a day  A serving is:  · 1 medium fruit  · One-quarter cup dried fruit  · Half a cup fresh, frozen, or canned fruit  · Half a cup of 100% fruit juices  Best choices: A variety of fresh fruits of different colors. Whole fruits are a better choice than fruit juices. Low-fat or fat-free dairy  Servings: 2 to 3 a day  A serving is:  · 1 cup milk  · 1 cup yogurt  · One and a half ounces cheese  Best choices: Skim or 1% milk, low-fat or fat-free yogurt or buttermilk, and low-fat cheeses.         Lean meats, poultry, fish  Servings: 6 or fewer a day  A serving is:  · 1 ounce cooked meats, poultry, or fish  · 1  egg  Best choices: Lean poultry and fish. Trim away visible fat. Broil, grill, roast, or boil instead of frying. Remove skin from poultry before eating. Limit how much red meat you eat.  Nuts, seeds, beans  Servings: 4 to 5 a week  A serving is:  · One-third cup nuts (one and a half ounces)  · 2 tablespoons nut butter or seeds  · Half a cup cooked dry beans or legumes  Best choices: Dry roasted nuts with no salt added, lentils, kidney beans, garbanzo beans, and whole palacios beans.   Fats and oils  Servings: 2 to 3 a day  A serving is:  · 1 teaspoon vegetable oil  · 1 teaspoon soft margarine  · 1 tablespoon mayonnaise  · 2 tablespoons salad dressing  Best choices: Nut and vegetable oils (nontropical vegetable oils), such as olive and canola oil. Sweets  Servings: 5 a week or fewer  A serving is:  · 1 tablespoon sugar, maple syrup, or honey  · 1 tablespoon jam or jelly  · 1 half-ounce jelly beans (about 15)  · 1 cup lemonade  Best choices: Dried fruit can be a satisfying sweet. Choose low-fat sweets. And watch your serving sizes!      For more on the DASH eating plan, visit:  www.nhlbi.nih.gov/health/health-topics/topics/dash   Date Last Reviewed: 6/1/2016  © 3608-3373 [employer]. [address] [place] [address], [place] [address]. All rights reserved. This information is not intended as a substitute for professional medical care. Always follow your healthcare professional's instructions.        Controlling High Blood Pressure  High blood pressure (hypertension) is often called the silent killer. This is because many people who have it dont know it. High blood pressure is defined as 140/90 mm Hg or higher. Know your blood pressure and remember to check it regularly. Doing so can save your life. Here are some things you can do to help control your blood pressure.    Choose heart-healthy foods  · Select low-salt, low-fat foods. Limit sodium intake to 2,400 mg per day or the amount suggested by your healthcare  provider.  · Limit canned, dried, cured, packaged, and fast foods. These can contain a lot of salt.  · Eat 8 to 10 servings of fruits and vegetables every day.  · Choose lean meats, fish, or chicken.  · Eat whole-grain pasta, brown rice, and beans.  · Eat 2 to 3 servings of low-fat or fat-free dairy products.  · Ask your doctor about the DASH eating plan. This plan helps reduce blood pressure.  · When you go to a restaurant, ask that your meal be prepared with no added salt.  Maintain a healthy weight  · Ask your healthcare provider how many calories to eat a day. Then stick to that number.  · Ask your healthcare provider what weight range is healthiest for you. If you are overweight, a weight loss of only 3% to 5% of your body weight can help lower blood pressure. Generally, a good weight loss goal is to lose 10% of your body weight in a year.  · Limit snacks and sweets.  · Get regular exercise.  Get up and get active  · Choose activities you enjoy. Find ones you can do with friends or family. This includes bicycling, dancing, walking, and jogging.  · Park farther away from building entrances.  · Use stairs instead of the elevator.  · When you can, walk or bike instead of driving.  · Hyndman leaves, garden, or do household repairs.  · Be active at a moderate to vigorous level of physical activity for at least 40 minutes for a minimum of 3 to 4 days a week.   Manage stress  · Make time to relax and enjoy life. Find time to laugh.  · Communicate your concerns with your loved ones and your healthcare provider.  · Visit with family and friends, and keep up with hobbies.  Limit alcohol and quit smoking  · Men should have no more than 2 drinks per day.  · Women should have no more than 1 drink per day.  · Talk with your healthcare provider about quitting smoking. Smoking significantly increases your risk for heart disease and stroke. Ask your healthcare provider about community smoking cessation programs and other  options.  Medicines  If lifestyle changes arent enough, your healthcare provider may prescribe high blood pressure medicine. Take all medicines as prescribed. If you have any questions about your medicines, ask your healthcare provider before stopping or changing them.   Date Last Reviewed: 4/27/2016  © 3890-7425 [employer]. [address] [place] [address]. All rights reserved. This information is not intended as a substitute for professional medical care. Always follow your healthcare professional's instructions.        High Blood Pressure and Kidney Disease  If you have high blood pressure and it is not controlled, it can damage the walls of the blood vessels in your body including those in the kidneys. If that happens, the tiny filtration units, or nephrons, become damaged and less able to filter your blood and waste products in the blood. Lowering high blood pressure can reduce the amount of damage to your kidneys and help slow any progression of kidney disease. High blood pressure is one of the top two causes of kidney failure in [place] countries.     Follow the instructions that come with your kit.     Normal blood pressure  The systolic pressure is when your heart is beating and pumping blood. The diastolic pressure is when your heart is relaxing and refilling with blood. A normal blood pressure is less than 120/80. In chronic kidney disease, or CKD, the blood pressure goal is less than 130/80.   Check your blood pressure often  Checking your blood pressure is a simple test that you can do at home. Most pharmacies have in-store monitors and home blood pressure monitors. For best results, keep the hints below in mind.  · Always take your blood pressure at the same time of the day. Morning may be best.  · Sit so that you feel relaxed, and do not talk.  · Use the cuff on your bare arm.  · Place the cuff so it fits snugly on your upper arm. Some monitors are placed on the  wrist.  · Follow all the instructions that come with your kit.  · Keep a record of all your blood pressure readings.  · Take your record and kit with you to healthcare provider visits. Ask your healthcare provider to check your blood pressure using your kit, and compare your readings with your providers.  Take medicine as directed  Blood pressure medicines often play a large role in treatment. Your medicine will work best if its taken as directed. Be sure to do these things:  · Take your medicine at the same time each day.  · Find out if it should be taken with food.  · Call your healthcare provider if you think the medicine is making you dizzy or sick to your stomach.  · Do not skip doses.  · Do not stop taking your medicine unless your healthcare provider tells you to. Doing so may be harmful.  · Get regular urine and blood tests at least annually to watch for kidney disease or monitor existing kidney disease.  Addressing other risk factors for kidney disease  Many other factors can also contribute to kidney disease. Smoking, diabetes, dietary habits, lack of exercise, obesity, and other factors can contribute.  Date Last Reviewed: 2/1/2017  © 6700-5743 [employer]. [address] [place] [address]. All rights reserved. This information is not intended as a substitute for professional medical care. Always follow your healthcare professional's instructions.        Understanding the Link Between High Blood Pressure and Stroke  Each day that your blood pressure is too high, your chances of having a stroke are increased. Normal blood pressure is considered to be less than 120 over less than 80 millimeters of mercury (mmHg) or 120/80 mmHg. A stroke is a loss of brain function caused by a lack of blood to the brain. Stroke can result from the damage that ongoing high blood pressure causes in your vessels. If the affected vessel stops supplying blood to the brain, a stroke results.  High blood  pressure damages blood vessels    Vessels thicken  When blood presses against a vessel wall with too much force, muscles in the wall lose their ability to stretch. This causes the wall to thicken, which narrows the vessel passage and reduces blood flow.   Clots form  When blood pressure is too high, it can damage blood vessel walls which results in scar tissue. Fat and cholesterol (plaque) collect in the damaged spots. Blood cells stick to the plaque, forming a mass called a clot. A clot can block blood flow in the vessel.   Vessels break  Sometimes blood flows with enough force to weaken a vessel wall. If the vessel is small or damaged, the wall can break. When this happens, blood leaks into nearby tissue and kills cells. Other cells may die because blood cannot reach them.   Know the symptoms of stroke  During a stroke, blood supply to the brain is cut off. But with prompt medical help, a better recovery is more likely. Think of a stroke as a brain attack. Dont wait. Call 911 if you have any of the symptoms below:  · Sudden weakness or numbness on one side of the face or body, including a leg or an arm  · Sudden trouble seeing with one or both eyes  · Sudden double vision  · Sudden trouble talking, such as slurred speech  · Sudden severe headache  · Sudden problems using or understanding words  · Sudden dizziness or loss of balance  · Seizures for the first time   · Any of these symptoms that occur and then resolve    Date Last Reviewed: 6/13/2015  © 1459-4460 [employer]. [address] [place] [address], [place] [address]. All rights reserved. This information is not intended as a substitute for professional medical care. Always follow your healthcare professional's instructions.        Chronic Kidney Disease (CKD)     The role of the kidneys is to remove waste products and extra water from the blood.  When the kidneys do not work as they should, waste products begin to build up in the blood. This is called  chronic kidney disease (CKD). CKD means that you have kidney damage or a decrease in kidney function lasting at least 3 months. CKD allows extra water, waste, and toxins to build up in the body. This can eventually become life-threatening. You might need dialysis or a kidney transplant to stay alive. This most severe form is called end stage renal disease.  Diabetes is the leading causes of chronic renal failure. Other causes include high blood pressure, hardening of the arteries (atherosclerosis), lupus, inflammation of the blood vessels (vasculitis), and past viral or bacterial infections. Certain over-the-counter pain medicines can cause renal failure when taken often over a long period of time. These include aspirin, ibuprofen, and related anti-inflammatory medicines called NSAIDs (nonsteroidal anti-inflammatory drugs).  Home care  The following guidelines will help you care for yourself at home:  · If you have diabetes, talk with your healthcare provider about keeping your blood sugar under control. Ask if you need to make and changes to your diet, lifestyle, or medicines.  · If you have high blood pressure:  ¨ Take prescribed medicine to lower your blood pressure to the recommended goal of less than 130/80.  ¨ Start a regular exercise program that you enjoy. Check with your healthcare provider to be sure your planned exercise program is right for you.  ¨ Eat less salt (sodium). Your healthcare provider can tell you how much salt per day is safe for you.  · If you are overweight, talk with your healthcare provider about a weight loss plan.  · If you smoke, you must quit. Smoking makes kidney disease worse. Talk with your healthcare provider about ways to help you quit.  For more information, visit the following links:  ¨ www.smokefree.gov/sites/default/files/pdf/clearing-the-air-accessible.pdf  ¨ www.smokefree.gov  ¨ www.cancer.org/healthy/stayawayfromtobacco/guidetoquittingsmoking/  · Most people with CKD need  to follow a special diet.  Be sure you understand yours. In general, you will need to limit protein, salt, potassium, and phosphorus. You also need to limit how much fluid you drink.   · CKD is a risk factor for heart disease. Talk with your healthcare provider about any other risk factors you might have and what you can do to lessen them.  · Talk with your healthcare provider about any medicines you are taking to find out if they need to be reduced or stopped.  · Don't use the following over-the-counter medicines, or consult your healthcare provider before using:  ¨ Aspirin and NSAIDs such as ibuprofen or naproxen. Using acetaminophen for fever or pain is OK.  ¨ Laxatives and antacids containing magnesium or aluminum  ¨ Fleet or phospho soda enemas containing phosphorus  ¨ Certain stomach acid-blocking medicine such as cimetidine or ranitidine   ¨ Decongestants containing pseudoephedrine   ¨ Herbal supplements  Follow-up care  Follow up with your healthcare provider, or as advised. Contact one of the following for more information:  · American Association of Kidney Patients 561-182-5015 www.aakp.org  · National Kidney Foundation 582-126-9444 www.kidney.org  · American Kidney Fund 002-850-0030 www.kidneyfund.org  · National Kidney Disease Education Program 866-4KIDNEY www.nkdep.nih.gov  If an X-ray, ECG (cardiogram), or other diagnostic test was taken, you will be told of any new findings that may affect your care.  Call 911  Call 911 if you have any of the following:  · Severe weakness, dizziness, fainting, drowsiness, or confusion  · Chest pain or shortness of breath  · Heart beating fast, slow, or irregularly  When to seek medical advice  Call your healthcare provider right away if any of these occur:  · Nausea or vomiting  · Fever of 100.4°F (38°C) or higher, or as directed by your healthcare provider  · Unexpected weight gain or swelling in the legs, ankles, or around the eyes  · Decrease or absent urine  output  Date Last Reviewed: 9/1/2016 © 2000-2017 [employer] [address] [place] [address], [place] [address]. All rights reserved. This information is not intended as a substitute for professional medical care. Always follow your healthcare professional's instructions.        Diet for Chronic Kidney Disease  Following a special diet when you have kidney disease can help you stay as healthy as possible. Your healthcare provider or dietitian should make a special diet plan just for you.    Eating right  Here are some good eating rules to follow:  · Protein. Eating protein is important for your body. But too much protein can put a strain on your kidneys. Eating less protein may slow the progression of chronic kidney disease. Foods high in protein include meat, fish, eggs, cheese, and other dairy products. A registered dietitian can help you plan a diet that has the right amount of protein for you.  · Sodium. Having too much salt in your diet can make your body hold onto (retain) water. Ask your provider or dietitian how much sodium per day you are allowed. This will help you avoid fluid buildup in your body (fluid retention). It can also help control high blood pressure. Learn to read food labels to know how much sodium is in one serving. Foods high in salt include processed meats, canned and boxed foods, sauces, salted chips and snacks, pickled foods, frozen dinners, and restaurant and fast food.  · Fluids. If you have advanced kidney disease, you will need to limit the water and fluids you drink. If you dont, then too much water will build up in your body. The exact amount of fluid you can drink depends on how well your kidneys are working. Ask your provider how much water you can safely drink each day.  · Potassium. In advanced kidney disease, your potassium level can go dangerously high. This affects your heart. It can cause an irregular heartbeat (arrhythmia). Ask your provider or dietitian if you  should limit potassium in your diet. Foods high in potassium include dairy products (milk, yogurt, cheese), dried beans, bananas, oranges, potatoes, tomatoes, spinach, cantaloupe, honeydew melon, dried fruits, and nuts.   · Calcium. Calcium is important to build strong bones. But foods high in calcium are also high in phosphorus, which can take calcium from your bones. Limiting foods high in phosphorus will help keep calcium in your bones. Ask your provider how much calcium you should get each day.  · Phosphorus. In advanced kidney disease, your phosphorus level can go dangerously high. This affects many systems in the body and can damage your heart. Limit your intake of phosphorus-rich foods. These include dried beans and peas, nuts, peanut butter, cocoa, beer, cola drinks, and dairy products.  Date Last Reviewed: 8/1/2016  © 1412-2505 [employer]. [address] [place] [address]. All rights reserved. This information is not intended as a substitute for professional medical care. Always follow your healthcare professional's instructions.        Anemia and Kidney Disease  Anemia is a health problem that affects your blood. Normally, the kidneys make a protein (erythropoietin) that tells your body when to make new red blood cells. But if you have kidney disease, your kidneys may not be able to make enough of this protein. Another cause for anemia in kidney disease is iron deficiency. Iron is important in the process of manufacturing red blood cells. It is necessary to replace iron before using medicines (such as epoetin chloe injections). Use this handout to help you understand anemia and the medicines that can help control it.  What is anemia?  Anemia occurs when your blood does not have enough red cells in it. Then your blood cant carry as much oxygen to your body. As a result, all your organs are running on too little fuel. Red blood cells make up 35% to 45% of normal blood. If you have  anemia, your red cell count (hematocrit) is below 35.  Signs of anemia  Talk with your healthcare provider if you have any of these signs:  · Ongoing fatigue  · Shortness of breath  · Rapid, irregular heartbeat  · Trouble concentrating  · Impotence  · Feeling dizzy or lightheaded  · Constant feeling of being cold  · Pale skin  Medicines can help  If youre at risk for anemia, you may be given a medicine called epoetin chloe (sometimes called EPO). EPO is a manmade version of erythropoietin. EPO controls anemia by signaling your body to make red blood cells. Most people who take EPO feel better and become more active. Your healthcare provider can also check the blood levels of iron. Iron is the raw material that helps EPO increase the red blood cells. In some cases, you may need additional nutrients, like vitamins and minerals, to help improve your anemia.  How EPO and iron are used  EPO may be used to treat any person with kidney disease who has anemia, but is most often used to treat people on dialysis. EPO is given as an injection under the skin. This is how most CAPD (Continuous ambulatory peritoneal dialysis) patients receive it. Those on hemodialysis can receive it through their IV (intravenous) line if they are unable to handle the injections. This method is more expensive and may not be as effective as the injections. If you have low levels of iron, you may need to take iron-containing tablets or IV iron to increase the levels.  Date Last Reviewed: 1/1/2017  © 1146-9645 [employer]. [address] [place] [address], [place] [address]. All rights reserved. This information is not intended as a substitute for professional medical care. Always follow your healthcare professional's instructions.

## 2019-10-16 NOTE — PROGRESS NOTES
Outpatient Care Management  Initial Patient Assessment    Patient: Cecile Bowen  MRN: [id number]  Date of Service: 10/15/2019  Completed by: Ezequiel Cornejo RN  Referral Date: 09/25/2019  Program: Disease Management (Diabetes & COPD)    Reason for Visit   Patient presents with    OPCM Enrollment Call     10/15/19    Initial Assessment     10/15/19    PHQ-9     10/15/19    Plan Of Care     10/15/19       Assessment Documentation     OPCM Initial Assessment    Involvement of Care  Do I have permission to speak with other family members about your care?:  Yes (Comment: Kat Wheat (sister) [phone], [phone])  Assessment completed by:  Patient  Patient Reported Insurance  Verified current insurance plan:  Medicare  Current Health Status  Patient Health Rating  Compared to other people your age, how would you rate your health?:  Fair  Patient Reported Labs & Vitals  Any patient reported labs and/or vitals?:  Yes  Patient reported pulse (bpm):  NA  Patient reported weight (lbs):  NA  Patient reported blood glucose (mg/dL):  164  Social Determinants  Advanced Care Planning  Do you have any of the following?:  Caregiver make informed decisions on your behalf (Comment: sister is aware of wishes)  If yes, do we have a copy?:  No  If no, would the patient like Advance Directive resources?:  No  Advanced Care Planning resources provided?:  No  Is Advanced Care Planning an area of need?:  No  Support  Caregiver presence?:  Yes  Name of primary caregiver:  KAT WHEAT  Primary caregiver phone number:  [phone]  Primary caregiver relationship:  family member  Present activity level:  must stay in house most of the time  Who takes you to your medical appointments?:  patient  Is the caregiver supporting a need?:  Yes  Does the current primary caregiver meet the patient's needs?:  No  Housing  Living arrangements:  family members  Number of people in home:  3  Type of residence:  single family home  Own or rent?:   own  Permanent residence?:  yes  Does the patient's residence have any of the following?:  rugs in the hallway  Is housing an area of need?:  no  Access to [employer]  Does the patient have access to any of the following devices or technologies?:  home computer, Internet access  Clinical Assessment  Medication Adherence  How does the patient obtain their medications?:  patient drives, family picks up  How many days a week do you miss medications?:  1  Do you use a pill box or medication chart to help you manage your medications?:  pill box  Do you sometimes have difficulty refilling your medications?:  no  *Active medication list was reviewed and reconciled with patient and/or caregiver:    Nutritional Status  Diet:  Regular  Change in appetite?:  yes  Recent changes in weight?:  no  Dentition:  wears dentures (Comment: doesnt wear them anymore, feels like they are not centered to her mouth)  Labs  Do you have regular lab work to monitor your medications?:  yes  Type of lab work:  A1c  Where do you get your lab work done?:  [facility]  Is lab adherence an area of need?:  no  PHQ Depression Screen Score = 0  Cognitive/Behavioral Health  Alert and oriented?:  yes  Difficulty thinking?:  no  Requires prompting?:  no  Requires assistance for routine expression?:  no  Is Cognitive/Behavioral health an area of need?:  no  Culture/Anglican  Does patient have cultural or Spiritism beliefs that may impact ability to access healthcare?:  no  Communication  Language preference:  English   needed?:  no  Hearing problems?:  no  Decreased vision?:  yes  Legally blind?:  no  Vision assistance:  glasses  Is Communication an area of need?:  no  Health Literacy  Preferred learning method:  hands on  How often do you need to have someone help you read instructions, pamphlets, or other written material from your doctor or pharmacy?:  never  Is there a Health Literacy need?:  no  Activities of Daily  Living  Ambulation:  assistance required  Dressing:  assistance required  Bathing:  assistance required  Transfers:  independent  Toileting:  independent  Feeding:  independent  Cleaning home/chores:  assistance required  Telephone use:  independent  Shopping/attending doctors' appointments:  independent  Paying bills:  independent  Taking meds:  independent  Climbing stairs:  independent  Fall Risk  Patient mobility status:  Ambulatory  Number of falls in the past 12 months:  1  Fall risk?:  No  Equipment/Current Services  Equipment/supplies used in home:  scooter, walker, shower chair/rails, other (see comment) (Comment: elevated toilet seat)  Current services:  n/a  Is Equipment/Supplies/Services an area of need?:  yes  Community & Government Programs  Support:  none  Government suppot assistance:  N/A  Community Health Office of Aging and Adult Services:  N/A  Community Resource Assessment  Based on the assessment of needs:  Patient is in need of community resources  [facility]  [facility] to assist with the following:  other (see comment) (Comment: caregiver assistance, cooking service)  Completion of Initial Assessment  Is the Initial Assessment Complete at this time?:  yes         Reviewed and provided basic information on available community resources for mental health, transportation, wellness resources, and palliative care programs with patient and/or caregiver.    Problem List and History     Patient Active Problem List   Diagnosis    Hypertension associated with diabetes    Hypothyroidism    Fibromyalgia    Intractable chronic migraine without aura    Vitamin D deficiency disease    Hyperlipidemia associated with type 2 diabetes mellitus    VIJAYA (obstructive sleep apnea)    Abnormality of gait and mobility    Osteoarthritis of lumbar spine    Sideroblastic anemia    Iron deficiency anemia    Primary osteoarthritis involving multiple joints    Urinary retention    Neurogenic bladder    Major  depressive disorder, recurrent episode, moderate    Insomnia    Mixed migraine and muscle contraction headache    Secondary hyperparathyroidism, renal    Uncontrolled type 2 diabetes mellitus with chronic kidney disease, with long-term current use of insulin    Chronic suprapubic catheter    Pulmonary nodule    Osteopenia    Morbid obesity due to excess calories    Chronic daily headache    Long term prescription opiate use    Lumbar spondylosis    Chronic pain    Hydronephrosis    CKD stage 4 due to type 2 diabetes mellitus    Cervicogenic migraine    Malignant neoplasm of left breast, estrogen receptor positive    Risk for falls    Facet arthritis of lumbar region    Obesity, diabetes, and hypertension syndrome    Requires daily assistance for activities of daily living (ADL) and comfort needs    Presence of urostomy    S/P left mastectomy       Reviewed Active Problem List with patient and/or Caregiver.    Medical History:  Reviewed medical history with patient and/or caregiver    Social History:  Reviewed social history with patient and/or caregiver    Complex Care Plan     Care plan was discussed and completed today with input from patient and/or caregiver.    Goals      Patient/caregiver will accept life style changes to manage and improve CKD prior to discharge from OPCM. - Priority: High      Overall Time to Completion  3 months from 10/15/2019     OPCM Identified Patient Barriers:  Advanced Care Planning:  No  Housing:  no  Lab Adherence:  no  Cognitive/Behavioral Health:  no  Communication:  no  Health Literacy:  no  Equipment/Supplies/Services:  yes  Culural/Yazdanism Beliefs:  no  Fall Risk:  Neg     OPCM Identified Disease Education Barriers:  Hypertension:  Pos  Chronic Kidney Disease:  Pos       Short Term Goals    Patient/caregiver will verbalize current eGFR value and eGFR goal within 1 month.  Interventions   · Review current eGFR with patient 27.5.   · Review eGFR goal with  patient, >60.      Status  · Partially met      Patient/caregiver will verbalize 2 signs and symptoms of Kidney Disease that would necessitate a call to healthcare provider or 911 within 2 months.    Signs and Symptoms of Kidney Disease   Call your healthcare provider right away if any of these occur:   Nausea or vomiting   Fever of 100.4°F (38°C) or higher, or as directed by your healthcare provider   Unexpected weight gain or swelling in the legs, ankles, or around the eyes   Decrease or absent urine output   Call 911 if you have any of the following:   Severe weakness, dizziness, fainting, drowsiness, or confusion   Chest pain or shortness of breath   Heart beating fast, slow, or irregularly    Interventions   · Assess for retention of at least 3 signs and symptoms of Kidney Disease that would necessitate a call to healthcare provider or 911.  · Recognize and provide educational material (KARINE) on Chronic Kidney Disease. - Mailed  · Review with patient/caregiver the signs and symptoms of Kidney Disease that would necessitate a call to healthcare provider or 911.    Status  · Partially met    Patient/caregiver will verbalize 3 ways of preventing complications due to Kidney Disease within 2 months.   Ways of Preventing Complications of Kidney Disease  If you have diabetes, talk with your healthcare provider about keeping your blood sugar under control. Ask if you need to make and changes to your diet, lifestyle, or medicines.   If you have high blood pressure:  ? Take prescribed medicine to lower your blood pressure to the recommended goal of less than 130/80.  ? Start a regular exercise program that you enjoy. Check with your healthcare provider to be sure your planned exercise program is right for you.  ? Eat less salt (sodium). Your healthcare provider can tell you how much salt per day is safe for you.  If you are overweight, talk with your healthcare provider about a weight loss plan.  Most people with CKD  need to follow a special diet.  Be sure you understand yours. In general, you will need to limit protein, salt, potassium, and phosphorus. You also need to limit how much fluid you drink.   CKD is a risk factor for heart disease. Talk with your healthcare provider about any other risk factors you might have and what you can do to lessen them.  Talk with your healthcare provider about any medicines you are taking to find out if they need to be reduced or stopped.  Don't use the following over-the-counter medicines, or consult your healthcare provider before using:  ? Aspirin and NSAIDs such as ibuprofen or naproxen. Using acetaminophen for fever or pain is OK.  ? Laxatives and antacids containing magnesium or aluminum  ? Fleet or phospho soda enemas containing phosphorus  ? Certain stomach acid-blocking medicine such as cimetidine or ranitidine   ? Decongestants containing pseudoephedrine   ? Herbal supplements    Interventions   · Assess for retention of at least 3 ways of preventing complications due to Kidney Disease.   · Empower patient/caregiver to discuss treatment plan with Physician/care team.  · Educate on importance of compliance with annual vaccinations.  · Educate on importance of compliance with dialysis.  · Educate on importance of compliance with Physician follow-ups.  · Educate on importance of compliance with preventive screenings.  · Educate on importance of compliance with scheduled laboratory testing and procedure.  · Educate on importance of Dietary Compliance.  · Educate on importance of Medication Compliance.  · Recognize and provide educational material (KRAADRIAN) on Chronic Kidney Disease. - Mailed  · Review with patient/caregiver ways of prevention complications due to Kidney Disease.       Status  · Partially met               Clinical Reference Documents Added to Patient Instructions       Document    CHRONIC KIDNEY DISEASE (CKD) (ENGLISH)    CHRONIC KIDNEY DISEASE, DIET FOR (ENGLISH)     HEART-HEALTHY FOOD, EATING: USING THE DASH PLAN (ENGLISH)    HIGH BLOOD PRESSURE, CONTROLLING (ENGLISH)    KIDNEY DISEASE, ANEMIA AND (ENGLISH)    KIDNEY DISEASE, HIGH BLOOD PRESSURE AND (ENGLISH)    STROKE AND HIGH BLOOD PRESSURE, UNDERSTANDING THE LINK BETWEEN (ENGLISH)             Patient/caregiver will check and record blood pressure daily. If >140/90 for 3 days, patient/caregiver will know to notify Physician, prior to discharge from OPCM - Priority: High      Overall Time to Completion  2 months from 10/15/2019     OPCM Identified Patient Barriers:  NA    OPCM Identified Education Barriers:  Education Barrier for Hypertension--Care Plan Created  Education Barrier for CKD--Care Plan Created      Short Term Goals  Patient/caregiver will measure and record the blood pressure 1 times per day for 2 weeks.  Interventions   · Assess for availability of working blood pressure cuff in home setting.  · Assess for retention of the signs and symptoms of disease specific exacerbation.  · Collaborate with Physician as appropriate to meet patient needs.  · Mail Blood pressure logs for home use.    10/15/19-Patient/Caregiver agrees to check and log BP daily by next encounter.   Patient/Caregiver agrees to OPCM follow up within 2 weeks to assess progress to goal.      Status  · Partially met      Patient/caregiver will verbalize 2 red flags of Hypertension/Hypotension and know when to contact Physician within 2 weeks.  Interventions   · Assess for retention of the signs and symptoms of disease specific exacerbation.  · Collaborate with Physician as appropriate to meet patient needs.     Status  · Partially met      Patient/caregiver will verbalize 2 signs and symptoms of Hypertension/Hypotension within 3 weeks.   Interventions   Recognize and provide educational material (KARINE).     Status  · Partially met           Clinical Reference Documents Added to Patient Instructions       Document    CHRONIC KIDNEY DISEASE (CKD)  (ENGLISH)    CHRONIC KIDNEY DISEASE, DIET FOR (ENGLISH)    HEART-HEALTHY FOOD, EATING: USING THE DASH PLAN (ENGLISH)    HIGH BLOOD PRESSURE, CONTROLLING (ENGLISH)    KIDNEY DISEASE, ANEMIA AND (ENGLISH)    KIDNEY DISEASE, HIGH BLOOD PRESSURE AND (ENGLISH)    STROKE AND HIGH BLOOD PRESSURE, UNDERSTANDING THE LINK BETWEEN (ENGLISH)             Patient/caregiver will have knowledge of resources available in order to obtain the services that are needed prior to discharge from [facility]. - Priority: High      Overall Time to Completion  2 months from 10/15/2019     [facility] Identified Patient Barriers:      [facility] Identified Education Barriers:  Education Barrier for Hypertension--Care Plan Created  Education Barrier for CKD--Care Plan Created      Short Term Goals   Patient/caregiver will fill out any applications that were sent to patient to complete from [facility]  within 1 month.  Interventions   · Refer to Outpatient Case Management Social Worker.     Status  · Partially met      Patient/caregiver will have contact information for identified community resources IE:[facility]  for follow-up within 1 month.  Interventions   · Refer to Outpatient Case Management Social Worker.     Status  · Partially met      Patient/caregiver will notify RN CCM if patient does not hear from [facility]  within 2 weeks.  Interventions   · Refer to Outpatient Case Management Social Worker.     Status  · Partially met                Patient Instructions     Clinical Reference Documents Added to Patient Instructions    Document    CHRONIC KIDNEY DISEASE (CKD) (ENGLISH)    CHRONIC KIDNEY DISEASE, DIET FOR (ENGLISH)    HEART-HEALTHY FOOD, EATING: USING THE DASH PLAN (ENGLISH)    HIGH BLOOD PRESSURE, CONTROLLING (ENGLISH)    KIDNEY DISEASE, ANEMIA AND (ENGLISH)    KIDNEY DISEASE, HIGH BLOOD PRESSURE AND (ENGLISH)    STROKE AND HIGH BLOOD PRESSURE, UNDERSTANDING THE LINK BETWEEN (ENGLISH)        Instructions were provided via the  [facility] patient resources-sent via mail at time of enrollment.     Interventions:  Chart review completed  PHQ2 completed, score 0  Mail resource materials for CKD and Hypertension  Mail resource information on Diabetes (controlled)  Refer to [facility] to assist with needs (caregiver service, resources for cooking service)  Encourage compliance with medications and treatment regimen  Inquired about current BG monitoring      Plan:  F/U in 2 weeks as discussed  F/U on receipt of resource materials  Continue education on Hypertension. Review s/sx of Hypo/hypertension. Encourage compliance with medication and treatment regimen. Encourage attendance to appts.  Continue education on CKD  Collaborate with [facility] to assist with needs  Collaborate with providers to meet needs  Complete Med Rec  Mail Med list after completion of med Rec            Follow up in about 2 weeks (around 10/29/2019) for RN Follow UP.    [facility] Self-Management Care Plan was developed with the patients/caregivers input and was based on identified barriers from todays assessment.  Goals were written today with the patient/caregiver and the patient has agreed to work towards these goals to improve his/her overall well-being. Patient verbalized understanding of the care plan, goals, and all of today's instructions. Encouraged patient/caregiver to communicate with his/her physician and health care team about health conditions and the treatment plan.  Provided my contact information today and encouraged patient/caregiver to call me with any questions as needed.

## 2019-10-17 RX ORDER — CLONIDINE HYDROCHLORIDE 0.1 MG/1
TABLET ORAL
Qty: 90 TABLET | Refills: 1 | Status: SHIPPED | OUTPATIENT
Start: 2019-10-17 | End: 2019-11-08 | Stop reason: SDUPTHER

## 2019-10-17 NOTE — TELEPHONE ENCOUNTER
----- Message from Maryanne Zaldivar sent at 10/17/2019 11:00 AM CDT -----  Rx Refill/Request     Is this a Refill or New Rx:  Refill    Rx Name and Strength:  cloNIDine (CATAPRES) 0.1 MG tablet    Preferred Pharmacy with phone number:   [facility]/pharm[facility] #[id number] - [place], [place] - [address]  [address]  [place] [address]  Phone: [phone] Fax: [phone]    Communication Preference:  Additional Information:

## 2019-10-18 ENCOUNTER — TELEPHONE (OUTPATIENT)
Dept: PHARMACY | Facility: CLINIC | Age: 69
End: 2019-10-18

## 2019-10-18 NOTE — TELEPHONE ENCOUNTER
Patient returned phone call back regarding specialty medication refill for Aimovig $0/004- Patient scheduled to have medication ship out on 10/22/2019 to arrive 10/23/2019 address confirmed. Sharps container needed flag added. Patient informed no new medications, conditions or allergies since last talked to [facility]. Patient have 0 days remaining & no missed dose and will inject 10/24. Patient declined questions for the clinical pharmacist. Patient voiced understanding. FLC

## 2019-10-21 ENCOUNTER — TELEPHONE (OUTPATIENT)
Dept: HEMATOLOGY/ONCOLOGY | Facility: CLINIC | Age: 69
End: 2019-10-21

## 2019-10-21 NOTE — TELEPHONE ENCOUNTER
"----- Message from Jojo Person sent at 10/21/2019  2:08 PM CDT -----  Contact: Cecile Bowen    ----- Message -----  From: Priscilla Lala  Sent: 10/21/2019   1:14 PM CDT  To: Jojo Person    Reschedule Existing Appointment    Type of appt : bone density/ f/u   Reason for rescheduling?   - pt states that she has a migraine and will not come   out today, please call and reschedule today's appts.   Caller: Cecile Bowen   Contact Preference: [phone]  Physician: Remy Keating MD  Appt: 10/21    Additional Information:  "Thank you for all that you do for our patients'"    "

## 2019-10-21 NOTE — TELEPHONE ENCOUNTER
Received message from pt to reschedule appt for Dr. Cm and bone density for today due to a migraine. Called pt and rescheduled appts for 10/29.

## 2019-10-23 ENCOUNTER — PATIENT OUTREACH (OUTPATIENT)
Dept: ADMINISTRATIVE | Facility: OTHER | Age: 69
End: 2019-10-23

## 2019-10-28 ENCOUNTER — OFFICE VISIT (OUTPATIENT)
Dept: OPTOMETRY | Facility: CLINIC | Age: 69
End: 2019-10-28
Payer: MEDICARE

## 2019-10-28 ENCOUNTER — TELEPHONE (OUTPATIENT)
Dept: HEMATOLOGY/ONCOLOGY | Facility: CLINIC | Age: 69
End: 2019-10-28

## 2019-10-28 DIAGNOSIS — E11.9 TYPE 2 DIABETES MELLITUS WITHOUT RETINOPATHY: Primary | ICD-10-CM

## 2019-10-28 DIAGNOSIS — Z13.5 GLAUCOMA SCREENING: ICD-10-CM

## 2019-10-28 DIAGNOSIS — H25.13 NUCLEAR SCLEROSIS, BILATERAL: ICD-10-CM

## 2019-10-28 DIAGNOSIS — H52.4 HYPEROPIA WITH PRESBYOPIA OF BOTH EYES: ICD-10-CM

## 2019-10-28 DIAGNOSIS — H52.03 HYPEROPIA WITH PRESBYOPIA OF BOTH EYES: ICD-10-CM

## 2019-10-28 PROCEDURE — 99212 OFFICE O/P EST SF 10 MIN: CPT | Mod: PBBFAC,PO | Performed by: OPTOMETRIST

## 2019-10-28 PROCEDURE — 92015 DETERMINE REFRACTIVE STATE: CPT | Mod: ,,, | Performed by: OPTOMETRIST

## 2019-10-28 PROCEDURE — 92014 PR EYE EXAM, EST PATIENT,COMPREHESV: ICD-10-PCS | Mod: S$PBB,,, | Performed by: OPTOMETRIST

## 2019-10-28 PROCEDURE — 99999 PR PBB SHADOW E&M-EST. PATIENT-LVL II: ICD-10-PCS | Mod: PBBFAC,,, | Performed by: OPTOMETRIST

## 2019-10-28 PROCEDURE — 99999 PR PBB SHADOW E&M-EST. PATIENT-LVL II: CPT | Mod: PBBFAC,,, | Performed by: OPTOMETRIST

## 2019-10-28 PROCEDURE — 92015 PR REFRACTION: ICD-10-PCS | Mod: ,,, | Performed by: OPTOMETRIST

## 2019-10-28 PROCEDURE — 92014 COMPRE OPH EXAM EST PT 1/>: CPT | Mod: S$PBB,,, | Performed by: OPTOMETRIST

## 2019-10-28 NOTE — LETTER
October 28, 2019      Kallie Lee, NP  [address] [address]           [facility]  [address]  [place] [address]  Phone: [phone]  Fax: [phone]          Patient: Cecile Bowen   MR Number: [id number]   YOB: 1950   Date of Visit: 10/28/2019       Dear Kallie Lee:    Thank you for referring Cecile Bowen to me for evaluation. Attached you will find relevant portions of my assessment and plan of care.    If you have questions, please do not hesitate to call me. I look forward to following Cecile Bowen along with you.    Sincerely,    Ravi Kearns, OD    Enclosure  CC:  No Recipients    If you would like to receive this communication electronically, please contact [email or web address] or [phone] to request more information on [employer] Link access.    For providers and/or their staff who would like to refer a patient to [facility], please contact us through our one-stop-shop provider referral line, [facility], at [phone].    If you feel you have received this communication in error or would no longer like to receive these types of communications, please e-mail [email or web address]

## 2019-10-28 NOTE — PROGRESS NOTES
HPI     DLS:8/23/18  Pt states both eye are getting weaker with dist and reading. Wears OTC   reader +4.00, states after an hour on her computer she gets a headache.   States she did not bring her dist (tv) glasses with her today (otc +1.50).   Pt feels her blood sugar up and also feels her blood pressure  is up to.  Occ. Floaters, no flashes   No gtts  LBS: didn't check today   Hemoglobin A1C       Date                     Value               Ref Range             Status                05/30/2019               6.7 (H)             4.0 - 5.6 %           Final                     01/22/2019               7.9 (H)             4.0 - 5.6 %           Final                     12/13/2018               8.5 (H)             4.0 - 5.6 %           Final                   Last edited by Ravi Kearns, OD on 10/28/2019  1:50 PM. (History)        ROS     Positive for: Endocrine (DM)    Negative for: Constitutional, Gastrointestinal, Neurological, Skin,   Genitourinary, Musculoskeletal, HENT, Eyes, Respiratory, Psychiatric,   Allergic/Imm, Heme/Lymph    Last edited by Ravi Kearns, OD on 10/28/2019  1:50 PM. (History)        Assessment /Plan     For exam results, see Encounter Report.    Type 2 diabetes mellitus without retinopathy    Nuclear sclerosis, bilateral    Glaucoma screening    Hyperopia with presbyopia of both eyes      1. Cat OU w inc hyp.  Pt can use  otc +1.75 for dist, +4.50 for near, or I wrote bifocal Rx  2. DM- WITHOUT RETINOPATHY.  Advised yearly DFE   3. Dry eye sx--advised SYSTANE ATs BID+    PLAN:    rtc 1 yr

## 2019-10-29 ENCOUNTER — HOSPITAL ENCOUNTER (OUTPATIENT)
Dept: RADIOLOGY | Facility: CLINIC | Age: 69
Discharge: HOME | End: 2019-10-29
Attending: INTERNAL MEDICINE
Payer: MEDICARE

## 2019-10-29 ENCOUNTER — OUTPATIENT CASE MANAGEMENT (OUTPATIENT)
Dept: ADMINISTRATIVE | Facility: OTHER | Age: 69
End: 2019-10-29

## 2019-10-29 ENCOUNTER — OFFICE VISIT (OUTPATIENT)
Dept: HEMATOLOGY/ONCOLOGY | Facility: CLINIC | Age: 69
End: 2019-10-29
Payer: MEDICARE

## 2019-10-29 VITALS
OXYGEN SATURATION: 95 % | DIASTOLIC BLOOD PRESSURE: 81 MMHG | BODY MASS INDEX: 44.41 KG/M2 | SYSTOLIC BLOOD PRESSURE: 167 MMHG | HEIGHT: 68 IN | RESPIRATION RATE: 20 BRPM | TEMPERATURE: 98 F | WEIGHT: 293 LBS | HEART RATE: 103 BPM

## 2019-10-29 DIAGNOSIS — E66.01 MORBID OBESITY DUE TO EXCESS CALORIES: ICD-10-CM

## 2019-10-29 DIAGNOSIS — C50.612 MALIGNANT NEOPLASM OF AXILLARY TAIL OF LEFT BREAST IN FEMALE, ESTROGEN RECEPTOR POSITIVE: Primary | ICD-10-CM

## 2019-10-29 DIAGNOSIS — T38.6X5A OSTEOPOROSIS DUE TO AROMATASE INHIBITOR: ICD-10-CM

## 2019-10-29 DIAGNOSIS — Z17.0 MALIGNANT NEOPLASM OF AXILLARY TAIL OF LEFT BREAST IN FEMALE, ESTROGEN RECEPTOR POSITIVE: Primary | ICD-10-CM

## 2019-10-29 DIAGNOSIS — M81.8 OSTEOPOROSIS DUE TO AROMATASE INHIBITOR: ICD-10-CM

## 2019-10-29 PROCEDURE — 77080 DEXA BONE DENSITY SPINE HIP: ICD-10-PCS | Mod: 26,,, | Performed by: INTERNAL MEDICINE

## 2019-10-29 PROCEDURE — 99214 OFFICE O/P EST MOD 30 MIN: CPT | Mod: S$PBB,,, | Performed by: INTERNAL MEDICINE

## 2019-10-29 PROCEDURE — 99999 PR PBB SHADOW E&M-EST. PATIENT-LVL V: ICD-10-PCS | Mod: PBBFAC,,, | Performed by: INTERNAL MEDICINE

## 2019-10-29 PROCEDURE — 99215 OFFICE O/P EST HI 40 MIN: CPT | Mod: PBBFAC,25 | Performed by: INTERNAL MEDICINE

## 2019-10-29 PROCEDURE — 77080 DXA BONE DENSITY AXIAL: CPT | Mod: 26,,, | Performed by: INTERNAL MEDICINE

## 2019-10-29 PROCEDURE — 99999 PR PBB SHADOW E&M-EST. PATIENT-LVL V: CPT | Mod: PBBFAC,,, | Performed by: INTERNAL MEDICINE

## 2019-10-29 PROCEDURE — 99214 PR OFFICE/OUTPT VISIT, EST, LEVL IV, 30-39 MIN: ICD-10-PCS | Mod: S$PBB,,, | Performed by: INTERNAL MEDICINE

## 2019-10-29 PROCEDURE — 77080 DXA BONE DENSITY AXIAL: CPT | Mod: TC

## 2019-10-29 NOTE — PROGRESS NOTES
Outpatient Care Management  Plan of Care Follow Up Visit    Patient: Cecile Bowen  MRN: [id number]  Date of Service: 10/29/2019  Completed by: Ezequiel Cornejo RN  Referral Date: 09/25/2019  Program: Disease Management (Diabetes & COPD)    Reason for Visit   Patient presents with    Update Plan Of Care     10/29/19       Patient Summary     Involvement of Care:  Do I have permission to speak with other family members about your care?   NA    Problem List     Patient Active Problem List   Diagnosis    Hypertension associated with diabetes    Hypothyroidism    Fibromyalgia    Intractable chronic migraine without aura    Vitamin D deficiency disease    Hyperlipidemia associated with type 2 diabetes mellitus    VIJAYA (obstructive sleep apnea)    Abnormality of gait and mobility    Osteoarthritis of lumbar spine    Sideroblastic anemia    Iron deficiency anemia    Primary osteoarthritis involving multiple joints    Urinary retention    Neurogenic bladder    Major depressive disorder, recurrent episode, moderate    Insomnia    Mixed migraine and muscle contraction headache    Secondary hyperparathyroidism, renal    Uncontrolled type 2 diabetes mellitus with chronic kidney disease, with long-term current use of insulin    Chronic suprapubic catheter    Pulmonary nodule    Osteopenia    Morbid obesity due to excess calories    Chronic daily headache    Long term prescription opiate use    Lumbar spondylosis    Chronic pain    Hydronephrosis    CKD stage 4 due to type 2 diabetes mellitus    Cervicogenic migraine    Malignant neoplasm of left breast, estrogen receptor positive    Risk for falls    Facet arthritis of lumbar region    Obesity, diabetes, and hypertension syndrome    Requires daily assistance for activities of daily living (ADL) and comfort needs    Presence of urostomy    S/P left mastectomy       Reviewed Active Problem List with patient and/or Caregiver.    Patient Reported  Labs & Vitals:  1.  Any Patient Reported Labs & Vitals?  Yes  2.  Patient Reported Blood Pressure:     3.  Patient Reported Pulse:     4.  Patient Reported Weight (Kg):     5.  Patient Reported Blood Glucose (mg/dl):  200-300    Medical History:  Reviewed medical history with patient and/or caregiver    Social History:  Reviewed social history with patient and/or caregiver    Clinical Assessment     Reviewed and provided basic information on available community resources for mental health, transportation, wellness resources, and palliative care programs with patient and/or caregiver.    Complex Care Plan     Care plan was discussed and completed today with input from patient and/or caregiver.  Goals      Patient/caregiver will accept life style changes to manage and improve CKD prior to discharge from OPCM. - Priority: High      Overall Time to Completion  3 months from 10/15/2019     OPCM Identified Patient Barriers:  Advanced Care Planning:  No  Housing:  no  Lab Adherence:  no  Cognitive/Behavioral Health:  no  Communication:  no  Health Literacy:  no  Equipment/Supplies/Services:  yes  Culural/Judaism Beliefs:  no  Fall Risk:  Neg     OPCM Identified Disease Education Barriers:  Hypertension:  Pos  Chronic Kidney Disease:  Pos       Short Term Goals    Patient/caregiver will verbalize current eGFR value and eGFR goal within 1 month.  Interventions   · Review current eGFR with patient 27.5.   · Review eGFR goal with patient, >60.     10/29/19-Patient/Caregiver agrees to review resource material on CKD by  Next encounter.  Patient/Caregiver agrees to OPCM follow up within 2 weeks to assess progress to goal.        Status  · Partially met      Patient/caregiver will verbalize 2 signs and symptoms of Kidney Disease that would necessitate a call to healthcare provider or 911 within 2 months.    Signs and Symptoms of Kidney Disease   Call your healthcare provider right away if any of these occur:   Nausea or  vomiting   Fever of 100.4°F (38°C) or higher, or as directed by your healthcare provider   Unexpected weight gain or swelling in the legs, ankles, or around the eyes   Decrease or absent urine output   Call 911 if you have any of the following:   Severe weakness, dizziness, fainting, drowsiness, or confusion   Chest pain or shortness of breath   Heart beating fast, slow, or irregularly    Interventions   · Assess for retention of at least 3 signs and symptoms of Kidney Disease that would necessitate a call to healthcare provider or 911.  · Recognize and provide educational material (KRAADRIAN) on Chronic Kidney Disease. - Mailed  · Review with patient/caregiver the signs and symptoms of Kidney Disease that would necessitate a call to healthcare provider or 911.    10/29/19-Patient/Caregiver agrees to recognize 2 s/sx of kidney disease that need a call to her HCP or 911 by End of enrollment.  Patient/Caregiver agrees to OPCM follow up within 2 weeks to assess progress to goal.       Status  · Partially met    Patient/caregiver will verbalize 3 ways of preventing complications due to Kidney Disease within 2 months.   Ways of Preventing Complications of Kidney Disease  If you have diabetes, talk with your healthcare provider about keeping your blood sugar under control. Ask if you need to make and changes to your diet, lifestyle, or medicines.   If you have high blood pressure:  ? Take prescribed medicine to lower your blood pressure to the recommended goal of less than 130/80.  ? Start a regular exercise program that you enjoy. Check with your healthcare provider to be sure your planned exercise program is right for you.  ? Eat less salt (sodium). Your healthcare provider can tell you how much salt per day is safe for you.  If you are overweight, talk with your healthcare provider about a weight loss plan.  Most people with CKD need to follow a special diet.  Be sure you understand yours. In general, you will need to  limit protein, salt, potassium, and phosphorus. You also need to limit how much fluid you drink.   CKD is a risk factor for heart disease. Talk with your healthcare provider about any other risk factors you might have and what you can do to lessen them.  Talk with your healthcare provider about any medicines you are taking to find out if they need to be reduced or stopped.  Don't use the following over-the-counter medicines, or consult your healthcare provider before using:  ? Aspirin and NSAIDs such as ibuprofen or naproxen. Using acetaminophen for fever or pain is OK.  ? Laxatives and antacids containing magnesium or aluminum  ? Fleet or phospho soda enemas containing phosphorus  ? Certain stomach acid-blocking medicine such as cimetidine or ranitidine   ? Decongestants containing pseudoephedrine   ? Herbal supplements    Interventions   · Assess for retention of at least 3 ways of preventing complications due to Kidney Disease.   · Empower patient/caregiver to discuss treatment plan with Physician/care team.  · Educate on importance of compliance with annual vaccinations.  · Educate on importance of compliance with dialysis.  · Educate on importance of compliance with Physician follow-ups.  · Educate on importance of compliance with preventive screenings.  · Educate on importance of compliance with scheduled laboratory testing and procedure.  · Educate on importance of Dietary Compliance.  · Educate on importance of Medication Compliance.  · Recognize and provide educational material (KARINE) on Chronic Kidney Disease. - Mailed  · Review with patient/caregiver ways of prevention complications due to Kidney Disease.       Status  · Partially met               Clinical Reference Documents Added to Patient Instructions       Document    CHRONIC KIDNEY DISEASE (CKD) (ENGLISH)    CHRONIC KIDNEY DISEASE, DIET FOR (ENGLISH)    HEART-HEALTHY FOOD, EATING: USING THE DASH PLAN (ENGLISH)    HIGH BLOOD PRESSURE, CONTROLLING  (ENGLISH)    KIDNEY DISEASE, ANEMIA AND (ENGLISH)    KIDNEY DISEASE, HIGH BLOOD PRESSURE AND (ENGLISH)    STROKE AND HIGH BLOOD PRESSURE, UNDERSTANDING THE LINK BETWEEN (ENGLISH)             Patient/caregiver will check and record blood pressure daily. If >140/90 for 3 days, patient/caregiver will know to notify Physician, prior to discharge from OPCM - Priority: High      Overall Time to Completion  2 months from 10/15/2019     OPCM Identified Patient Barriers:  NA    OPCM Identified Education Barriers:  Education Barrier for Hypertension--Care Plan Created  Education Barrier for CKD--Care Plan Created      Short Term Goals  Patient/caregiver will measure and record the blood pressure 1 times per day for 2 weeks.  Interventions   · Assess for availability of working blood pressure cuff in home setting.  · Assess for retention of the signs and symptoms of disease specific exacerbation.  · Collaborate with Physician as appropriate to meet patient needs.  · Mail Blood pressure logs for home use.    10/15/19-Patient/Caregiver agrees to check and log BP daily by next encounter. (10/29-patient reported that received the BP logs and has checking using them to monitor her BP).   Patient/Caregiver agrees to OPCM follow up within 2 weeks to assess progress to goal.-DONE 10/29     Status  · Partially met      Patient/caregiver will verbalize 2 red flags of Hypertension/Hypotension and know when to contact Physician within 2 weeks.  Interventions   · Assess for retention of the signs and symptoms of disease specific exacerbation.  · Collaborate with Physician as appropriate to meet patient needs.      10/29/19-Patient/Caregiver agrees to recognize 2 red flags of Hypo/hypertension by end of enrollment.  Patient/Caregiver agrees to OPCM follow up within 2 weeks to assess progress to goal.      Status  · Partially met      Patient/caregiver will verbalize 2 signs and symptoms of Hypertension/Hypotension within 3 weeks.    Interventions   Recognize and provide educational material ([employer]).Mailed 10/14     Status  · Partially met           Clinical Reference Documents Added to Patient Instructions       Document    CHRONIC KIDNEY DISEASE (CKD) (ENGLISH)    CHRONIC KIDNEY DISEASE, DIET FOR (ENGLISH)    HEART-HEALTHY FOOD, EATING: USING THE DASH PLAN (ENGLISH)    HIGH BLOOD PRESSURE, CONTROLLING (ENGLISH)    KIDNEY DISEASE, ANEMIA AND (ENGLISH)    KIDNEY DISEASE, HIGH BLOOD PRESSURE AND (ENGLISH)    STROKE AND HIGH BLOOD PRESSURE, UNDERSTANDING THE LINK BETWEEN (ENGLISH)             Patient/caregiver will have knowledge of resources available in order to obtain the services that are needed prior to discharge from \[facility]\"". - Priority: High      Overall Time to Completion  2 months from 10/15/2019     \[facility]\"" Identified Patient Barriers:      OPCM Identified Education Barriers:  Education Barrier for Hypertension--Care Plan Created  Education Barrier for CKD--Care Plan Created      Short Term Goals   Patient/caregiver will fill out any applications that were sent to patient to complete from \[facility]\""  within 1 month.  Interventions   · Refer to Outpatient Case Management Social Worker. Done 10/14, 10/29-pending outreach     Status  · Partially met      Patient/caregiver will have contact information for identified community resources IE:\[facility]\""  for follow-up within 1 month.  Interventions   · Refer to Outpatient Case Management Social Worker. Done 10/14, 10/29-pending outreach     Status  · Partially met      Patient/caregiver will notify RN CCM if patient does not hear from OPC  within 2 weeks.  Interventions   · Refer to Outpatient Case Management Social Worker. Done 10/14, 10/29-pending outreach     Status  · Partially met                Patient Instructions     Instructions were provided via the [employer] patient resources-Mailed to patient upon enrollment.      Interventions:  Chart review  completed  Confirmed receipt of resource materials  Message sent to [facility] team, update on BG readings with request for outreach (patient would like to be seen prior to leaving for vacation)  Message PCP with medication discrepancies  Mail detailed list of current medications with name, dose, route, frequency and purpose  Encourage compliance with medications and treatment regimen  Inquired about current BG monitoring (200s-300s)  Confirmed compliance with BP monitoring and logging        Plan:  F/U in 2 weeks as discussed  F/U on receipt of resource materials (recevied 10/29)  Continue education on Hypertension. Review s/sx of Hypo/hypertension. Encourage compliance with medication and treatment regimen. Encourage attendance to appts.  Continue education on CKD  Collaborate with OPCM LMSW to assist with needs  Collaborate with providers to meet needs  Complete Med Rec (completed 10/29)  Mail Med list after completion of med Rec (Done 10/29)       Follow up in about 10 days (around 11/8/2019) for RN Follow UP.      [facility]PC Self-Management Care Plan was developed with the patients/caregivers input and was based on identified barriers from todays assessment.  Goals were written today with the patient/caregiver and the patient has agreed to work towards these goals to improve his/her overall well-being. Patient verbalized understanding of the care plan, goals, and all of today's instructions. Encouraged patient/caregiver to communicate with his/her physician and health care team about health conditions and the treatment plan.  Provided my contact information today and encouraged patient/caregiver to call me with any questions as needed.

## 2019-10-30 ENCOUNTER — OFFICE VISIT (OUTPATIENT)
Dept: INTERNAL MEDICINE | Facility: CLINIC | Age: 69
End: 2019-10-30
Payer: MEDICARE

## 2019-10-30 ENCOUNTER — OFFICE VISIT (OUTPATIENT)
Dept: UROLOGY | Facility: CLINIC | Age: 69
End: 2019-10-30
Payer: MEDICARE

## 2019-10-30 VITALS
SYSTOLIC BLOOD PRESSURE: 128 MMHG | BODY MASS INDEX: 44.41 KG/M2 | WEIGHT: 293 LBS | HEIGHT: 68 IN | DIASTOLIC BLOOD PRESSURE: 84 MMHG

## 2019-10-30 VITALS — DIASTOLIC BLOOD PRESSURE: 94 MMHG | HEART RATE: 112 BPM | SYSTOLIC BLOOD PRESSURE: 165 MMHG

## 2019-10-30 DIAGNOSIS — N18.4 CKD STAGE 4 DUE TO TYPE 2 DIABETES MELLITUS: ICD-10-CM

## 2019-10-30 DIAGNOSIS — F33.1 MAJOR DEPRESSIVE DISORDER, RECURRENT EPISODE, MODERATE: ICD-10-CM

## 2019-10-30 DIAGNOSIS — E03.9 HYPOTHYROIDISM, UNSPECIFIED TYPE: ICD-10-CM

## 2019-10-30 DIAGNOSIS — E11.69 HYPERLIPIDEMIA ASSOCIATED WITH TYPE 2 DIABETES MELLITUS: ICD-10-CM

## 2019-10-30 DIAGNOSIS — Z17.0 MALIGNANT NEOPLASM OF AXILLARY TAIL OF LEFT BREAST IN FEMALE, ESTROGEN RECEPTOR POSITIVE: ICD-10-CM

## 2019-10-30 DIAGNOSIS — E78.5 HYPERLIPIDEMIA ASSOCIATED WITH TYPE 2 DIABETES MELLITUS: ICD-10-CM

## 2019-10-30 DIAGNOSIS — N31.9 NEUROGENIC BLADDER: Primary | ICD-10-CM

## 2019-10-30 DIAGNOSIS — Z93.59 CHRONIC SUPRAPUBIC CATHETER: ICD-10-CM

## 2019-10-30 DIAGNOSIS — M79.7 FIBROMYALGIA: ICD-10-CM

## 2019-10-30 DIAGNOSIS — Z90.12 S/P LEFT MASTECTOMY: ICD-10-CM

## 2019-10-30 DIAGNOSIS — Z43.5 ENCOUNTER FOR CARE OR REPLACEMENT OF SUPRAPUBIC TUBE: ICD-10-CM

## 2019-10-30 DIAGNOSIS — E11.22 CKD STAGE 4 DUE TO TYPE 2 DIABETES MELLITUS: ICD-10-CM

## 2019-10-30 DIAGNOSIS — C50.612 MALIGNANT NEOPLASM OF AXILLARY TAIL OF LEFT BREAST IN FEMALE, ESTROGEN RECEPTOR POSITIVE: ICD-10-CM

## 2019-10-30 DIAGNOSIS — N13.30 HYDRONEPHROSIS, UNSPECIFIED HYDRONEPHROSIS TYPE: ICD-10-CM

## 2019-10-30 DIAGNOSIS — G47.33 OSA (OBSTRUCTIVE SLEEP APNEA): ICD-10-CM

## 2019-10-30 DIAGNOSIS — I15.2 HYPERTENSION ASSOCIATED WITH DIABETES: ICD-10-CM

## 2019-10-30 DIAGNOSIS — E11.59 HYPERTENSION ASSOCIATED WITH DIABETES: ICD-10-CM

## 2019-10-30 DIAGNOSIS — E66.01 MORBID OBESITY DUE TO EXCESS CALORIES: ICD-10-CM

## 2019-10-30 PROCEDURE — 51705 CHANGE OF BLADDER TUBE: CPT | Mod: PBBFAC | Performed by: NURSE PRACTITIONER

## 2019-10-30 PROCEDURE — 99214 OFFICE O/P EST MOD 30 MIN: CPT | Mod: S$PBB,25,, | Performed by: NURSE PRACTITIONER

## 2019-10-30 PROCEDURE — 99214 PR OFFICE/OUTPT VISIT, EST, LEVL IV, 30-39 MIN: ICD-10-PCS | Mod: S$PBB,25,, | Performed by: NURSE PRACTITIONER

## 2019-10-30 PROCEDURE — 99999 PR PBB SHADOW E&M-EST. PATIENT-LVL IV: CPT | Mod: PBBFAC,,, | Performed by: NURSE PRACTITIONER

## 2019-10-30 PROCEDURE — 99999 PR PBB SHADOW E&M-EST. PATIENT-LVL IV: ICD-10-PCS | Mod: PBBFAC,,, | Performed by: NURSE PRACTITIONER

## 2019-10-30 PROCEDURE — 99214 PR OFFICE/OUTPT VISIT, EST, LEVL IV, 30-39 MIN: ICD-10-PCS | Mod: S$PBB,,, | Performed by: NURSE PRACTITIONER

## 2019-10-30 PROCEDURE — 99214 OFFICE O/P EST MOD 30 MIN: CPT | Mod: PBBFAC,25,27 | Performed by: NURSE PRACTITIONER

## 2019-10-30 PROCEDURE — 99214 OFFICE O/P EST MOD 30 MIN: CPT | Mod: PBBFAC,25 | Performed by: NURSE PRACTITIONER

## 2019-10-30 PROCEDURE — 51705 CHANGE OF BLADDER TUBE: CPT | Mod: S$PBB,,, | Performed by: NURSE PRACTITIONER

## 2019-10-30 PROCEDURE — 51705 PR CHANGE OF BLADDER TUBE,SIMPLE: ICD-10-PCS | Mod: S$PBB,,, | Performed by: NURSE PRACTITIONER

## 2019-10-30 PROCEDURE — 99214 OFFICE O/P EST MOD 30 MIN: CPT | Mod: S$PBB,,, | Performed by: NURSE PRACTITIONER

## 2019-10-30 NOTE — PROGRESS NOTES
CC: This 69 y.o. female presents for management of diabetes mellitus     HPI: She was diagnosed with T2DM in 2006, found on routine bloodwork screening given family hx of DM. Previously tried Actos but not effective.   Has h/o migraines, lumbar spondylosis, VIJAYA, obesity, depression, HTN, hydronephrosis, CKD 4, vit d deficiency, (L) breast ca, s/p mastectomy 8/1/19.  Last seen by CAROLE Dwyer DNP in 2017.  Last seen by me in spring 2019 and is now being seen by me again today.  Has h/o recurrent hypoglycemia;.  Uses [employer] supplier for freestyle nadya    Of note, did not do well on invokana.     Since them, pt needs lab work, reports more hyperglycemic events.    Lives w/ brother (scoliosis, ca) and sister (has dm, herniated disc), gets meals on wheels.   a1c went from 7.9 to 6.7%  Reports that her migraines have improved over the past 6 mos.  Has f/u with cardiology and nephrology, CKD 4.     Going on cruise --nov 10-17th.      Lab Results   Component Value Date    HGBA1C 6.7 (H) 05/30/2019     Hard to cook meals, ADLs at times   Social hx: ex gutierrez    DIET/ MEAL PATTERN: Eat 3 meals a day,   Breakfast- yogurts, fruit  Lunch- hot meals, smoothie from [employer]   Dinner- Ready to Eat from [employer] , lasagna  Bedtime snacks-limits, if so popcorn  Stopped meals on wheels    EXERCISE: no formal exercise, limited r/t back pain  7/10    CURRENT DM MEDS: Lantus Solostar 24 units daily, Humalog vials 9-9-9 units  w/ scale  See media  Freestyle nadya  avg 234 mg/dl  68% above goal  31% target  1% hypoglycemia   Mostly 200s-300s    July 2019: switch to [facility]    STANDARDS OF CARE:  Diabetes Management Status    Statin: Taking  ACE/ARB: Not taking    Screening or Prevention Patient's value Goal Complete/Controlled?   HgA1C Testing and Control   Lab Results   Component Value Date    HGBA1C 6.7 (H) 05/30/2019      Annually/Less than 8% Yes   Lipid profile : 06/26/2019 Annually Yes   LDL control Lab Results   Component  Value Date    LDLCALC 92.8 06/26/2019    Annually/Less than 100 mg/dl  No   Nephropathy screening Lab Results   Component Value Date    LABMICR 178.0 09/21/2017     Lab Results   Component Value Date    PROTEINUA 2+ (A) 08/14/2019    Annually Yes   Blood pressure BP Readings from Last 1 Encounters:   10/30/19 128/84    Less than 140/90 Yes   Dilated retinal exam : 10/28/2019 Annually Yes   Foot exam   : 10/11/2019 Annually Yes     No history of retinopathy. Sees Dr Kearns   Foot exam: 10/2018     BMD 12/16- + osteopenia, due 12/2019, needs to reschedule                    ROS:   Gen: good appetite, generalized fatigue and weakness. Wt loss 3#  Skin: Skin is intact, no rashes .  Eyes: + visual disturbances, +cataracts in both eyes, has 2 different glasses   Resp:   no SOB + WOLFE, no cough  Cardiac: No palpitations, chest pain, denies syncope, weakness, no edema or cyanosis.  GI: No nausea or vomiting, no constipation   /GYN: has suprapubic catheter placed.   PVD: c/o chronic back pain rates 7/10.  MS/Neuro: Denies numbness/ tingling in BLE; migraines every day-imitrex (generic-takes 1/2 tab #9 a month, excedrin migraine). In scooter  Psych: Denies drug/ETOH abuse, no hx. of eating disorders   Other systems: negative.    PE:  GENERAL: Well developed, well nourished.  PSYCH: AAOx3, appropriate mood and affect, pleasant expression, conversant, appears relaxed, well groomed.   EYES: Conjunctiva, corneas clear, no lid lag, EOM intact.  NECK: Supple, trachea midline  VASCULAR:  1+ (L) worse than (R)edema BLE, brisk capillary refill BUE.  SKIN:   Skin warm and dry.  no acanthosis nigracans. +venous stasis-worse on (L)-marking   Feet- footwear appropriate -diabetic shoes and socks      Hemoglobin A1C   Date Value Ref Range Status   05/30/2019 6.7 (H) 4.0 - 5.6 % Final     Comment:     ADA Screening Guidelines:  5.7-6.4%  Consistent with prediabetes  >or=6.5%  Consistent with diabetes  High levels of fetal hemoglobin  interfere with the HbA1C  assay. Heterozygous hemoglobin variants (HbS, HgC, etc)do  not significantly interfere with this assay.   However, presence of multiple variants may affect accuracy.     01/22/2019 7.9 (H) 4.0 - 5.6 % Final     Comment:     ADA Screening Guidelines:  5.7-6.4%  Consistent with prediabetes  >or=6.5%  Consistent with diabetes  High levels of fetal hemoglobin interfere with the HbA1C  assay. Heterozygous hemoglobin variants (HbS, HgC, etc)do  not significantly interfere with this assay.   However, presence of multiple variants may affect accuracy.     12/13/2018 8.5 (H) 4.0 - 5.6 % Final     Comment:     ADA Screening Guidelines:  5.7-6.4%  Consistent with prediabetes  >or=6.5%  Consistent with diabetes  High levels of fetal hemoglobin interfere with the HbA1C  assay. Heterozygous hemoglobin variants (HbS, HgC, etc)do  not significantly interfere with this assay.   However, presence of multiple variants may affect accuracy.       Lab Results   Component Value Date    TSH 3.357 05/30/2019       ASSESSMENT and PLAN:  1. Uncontrolled type 2 diabetes mellitus with chronic kidney disease, with long-term current use of insulin  Hemoglobin A1c    Comprehensive metabolic panel    Microalbumin/creatinine urine ratio    Hemoglobin A1c    Ambulatory Referral to Diabetes Education   2. Malignant neoplasm of axillary tail of left breast in female, estrogen receptor positive     3. S/P left mastectomy     4. Hyperlipidemia associated with type 2 diabetes mellitus     5. Hypertension associated with diabetes     6. Morbid obesity due to excess calories     7. Major depressive disorder, recurrent episode, moderate     8. Hydronephrosis, unspecified hydronephrosis type     9. Fibromyalgia     10. CKD stage 4 due to type 2 diabetes mellitus     11. VIJAYA (obstructive sleep apnea)     12. Hypothyroidism, unspecified type  TSH     1. F/u in 3 mos   DE in 6 weeks  Labs in 6 weeks  Then in 3 mos -a1c next time  a1c  goal less than 7.5%  Wt based 0.5 u/kg   Increase lantus to 32 units daily  Increase humalog to 12-12-12 units w/ scale 180-230+2, etc  Change to pen needles  flexpens  toujeo 32 units daily  humalog 12-12-12 units etc w/ same scale  2. F/u with hem/onc  3. See above  4.   Lab Results   Component Value Date    LDLCALC 92.8 06/26/2019     At goal  5. Controlled, continue med(s)  6. Body mass index is 45.16 kg/m². may increase insulin resistance  Hesitant with starting glp1a class  Lawsuit with invokana  7. On med, f/u with pcp  8. F/u with nephrology  9. F/u with pain management  10. F/u with nephrology   11. Not on cpap  12.   Lab Results   Component Value Date    TSH 3.357 05/30/2019     At goal   On lt4 50 mcg daily

## 2019-10-30 NOTE — PATIENT INSTRUCTIONS
Snacks can be an important part of a balanced, healthy meal plan. They allow you to eat more frequently, feeling full and satisfied throughout the day. Also, they allow you to spread carbohydrates evenly, which may stabilize blood sugars.  Plus, snacks are enjoyable!     The amount of carbohydrate needed at snacks varies. Generally, about 15-30 grams of carbohydrate per snack is recommended.  Below you will find some tasty treats.       0-5 gm carb   Crystal Light   Vitamin Water Zero   Herbal tea, unsweetened   2 tsp peanut butter on celery   1./2 cup sugar-free jell-o   1 sugar-free popsicle   ¼ cup blueberries   8oz Blue Anna unsweetened almond milk   5 baby carrots & celery sticks, cucumbers, bell peppers dipped in ¼ cup salsa, 2Tbsp light ranch dressing or 2Tbsp plain Greek yogurt   10 Goldfish crackers   ½ oz low-fat cheese or string cheese   1 closed handful of nuts, unsalted   1 Tbsp of sunflower seeds, unsalted   1 cup Smart Pop popcorn   1 whole grain brown rice cake        15 gm carb   1 small piece of fruit or ½ banana or 1/2 cup lite canned fruit   3 susana cracker squares   3 cups Smart Pop popcorn, top spray butter, Contreras lite salt or cinnamon and Truvia   5 Vanilla Wafers   ½ cup low fat, no added sugar ice cream or frozen yogurt (Blue bell, Blue Bunny, Weight Watchers, Skinny Cow)   ½ turkey, ham, or chicken sandwich   ½ c fruit with ½ c Cottage cheese   4-6 unsalted wheat crackers with 1 oz low fat cheese or 1 tbsp peanut butter    30-45 goldfish crackers (depending on flavor)    7-8 Congregation mini brown rice cakes (caramel, apple cinnamon, chocolate)    12 Congregation mini brown rice cakes (cheddar, bbq, ranch)    1/3 cup hummus dip with raw veg   1/2 whole wheat kena, 1Tbsp hummus   Mini Pizza (1/2 whole wheat English muffin, low-fat  cheese, tomato sauce)   100 calorie snack pack (Oreo, Chips Ahoy, Ritz Mix, Baked Cheetos)   4-6 oz. light or Greek Style yogurt  (Sharyn, Brynn, Liza, [facility])   ½ cup sugar-free pudding     6 in. wheat tortilla or kena oven toasted chips (topped with spray butter flavoring, cinnamon, Truvia OR spray butter, garlic powder, chili powder)    18 BBQ Popchips (available at [employer], Whole Foods, Fresh Market)                   Diabetes Support Group Meetings         Date: Topic:   February 14 Eat Fit BASIA/Health Promotion   March 14 Taking Care of Your Smile   April 11 Spring into Healthy Eating/Cooking Demo   May 9 Ease Your Mind with Diabetes   Zaynab 13 Summer Treats/Cooking Demo   July 11 Eat Fit BASIA/Super Market Sweep   August 8 Taking Care of Your Eyes and Feet   Sept 12 Technology/ADA updates   October 10 Recipes & Treats/Cooking Demo   November 14 Heart Health/Pump it up!   December 12 Year-End Close Out        Meetings are held in the [facility] ([facility]) of the [facility] located at [address] [place] [address]. Please call [phone] for additional information.    Free service, offered every 2nd Thursday of every month! Family members and/or friends are welcome as well!  Support group is for patients with type 1 or type 2 diabetes.    From 3:30p to 4:30p

## 2019-10-30 NOTE — PROGRESS NOTES
Subjective:       Patient ID: Cecile Bowen is a 69 y.o. female.    Chief Complaint: Neurogenic Bladder (S/P tube change)    Cecile Bowen is a 8 y.o. Diabetic Female with history of bilateral hydronephrosis, incomplete bladder emptying which is managed by SPT (16fr).  S/p Cystoscopy with bladder botox injection (300u) 09/17/2018 with Dr. Whittington.    She had recently discovered breast cancer; s/p Mastectomy 08/01/2019  Malignant neoplasm of upper-outer quadrant of left breast in female, estrogen receptor positive   No need for chemo/radiation therapy.    Her last SPT change was 09/30/2019.    Here today for scheduled SPT change.  Bladder spasms present but greatly reduced with Botox 08/26/2019 with Dr. Whittington.  No fever, n/v    She reports improved migraines with new medication    S/p Mastectomy 08/01/2019    She is going on a 7 day cruise next month.              Past Medical History:    Diabetes type 2, uncontrolled                   12/12/2012    Obesity                                         12/12/2012    Hypertension                                    12/12/2012    DJD (degenerative joint disease)                12/12/2012    Hypothyroidism                                  12/12/2012    Nuclear sclerosis - Both Eyes                   3/24/2014     Migraine                                                      Past Surgical History:    TOTAL ABDOMINAL HYSTERECTOMY W/ BILATERAL SALP*  1985          GALLBLADDER SURGERY                              2006          TONSILLECTOMY, ADENOIDECTOMY                                   OVARIAN CYST SURGERY                             1985          HYSTERECTOMY                                                   DILATION AND CURETTAGE OF UTERUS                               Review of patient's family history indicates:    Diabetes                       Sister                    Kidney disease                 Sister                    ALS                             "Mother                      Comment: d.    Cancer                         Maternal Grandmother        Comment: [email or web address] colon    Cancer                         Paternal Grandfather        Comment: d. lung    Diabetes                       Maternal Aunt             Kidney disease                 Maternal Aunt             Diabetes                       Maternal Uncle            Amblyopia                      Neg Hx                    Blindness                      Neg Hx                    Cataracts                      Neg Hx                    Glaucoma                       Neg Hx                    Macular degeneration           Neg Hx                    Retinal detachment             Neg Hx                    Strabismus                     Neg Hx                      Social History    Marital Status:             Spouse Name:                       Years of Education:                 Number of children:               Occupational History    None on file    Social History Main Topics    Smoking Status: Never Smoker                      Smokeless Status: Never Used                        Alcohol Use: No              Drug Use: No              Sexual Activity: Not Currently           Birth Control/Protection: Abstinence    Other Topics            Concern    None on file    Social History Narrative    Single      Lives w/ sister  And brother     both are helping her during her post hosp recovery period        Allergies:  Review of patient's allergies indicates no known allergies.    Medications:  Current outpatient prescriptions:amitriptyline (ELAVIL) 10 MG tablet, TAKE 3 TABLETS BY MOUTH IN THE EVENING, Disp: 90 tablet, Rfl: 5;  aspirin (ECOTRIN) 81 MG EC tablet, Take 81 mg by mouth once daily., Disp: , Rfl: ;  BD INSULIN PEN NEEDLE UF SHORT 31 X 5/16 " Ndle, USE ONCE DAILY WITH LANTUS SOLOSTAR PEN INSULIN, Disp: 100 each, Rfl: 11  butalbital-acetaminophen-caffeine -40 mg (FIORICET, ESGIC) -40 mg per " "tablet, TAKE 1 TABLET EVERY 4 TO 6 HOURS, Disp: 30 tablet, Rfl: 0;  cyanocobalamin, vitamin B-12, (VITAMIN B-12) 2,500 mcg Subl, Place 2,500 mcg under the tongue once daily., Disp: , Rfl: ;  diphenhydrAMINE (BENADRYL) 25 mg capsule, Take 25 mg by mouth every 6 (six) hours as needed., Disp: , Rfl:   ferrous sulfate 325 (65 FE) MG EC tablet, Take 325 mg by mouth 3 (three) times daily with meals., Disp: , Rfl: ;  fluticasone (FLONASE) 50 mcg/actuation nasal spray, 1 SPRAY IN EACH NOSTRIL DAILY (Patient taking differently: 1 SPRAY IN EACH NOSTRIL DAILY PRN), Disp: 16 g, Rfl: 1;  furosemide (LASIX) 20 MG tablet, Take 1 tablet (20 mg total) by mouth once daily., Disp: 4 tablet, Rfl: 0  gemfibrozil (LOPID) 600 MG tablet, TAKE 1 TABLET BY MOUTH TWICE A DAY, Disp: 60 tablet, Rfl: 5;  (START ON 4/29/2015) hydrocodone-acetaminophen 10-325mg (NORCO)  mg Tab, Take 1 tablet by mouth every 6 (six) hours as needed., Disp: 120 tablet, Rfl: 0;  insulin lispro (HUMALOG) 100 unit/mL injection, Inject 7 Units into the skin 3 (three) times daily before meals., Disp: 6.3 mL, Rfl: 11  insulin syringe-needle U-100 (BD INSULIN SYRINGE ULTRA-FINE) 1/2 mL 31 x 15/64" Syrg, 1 Box by Misc.(Non-Drug; Combo Route) route 4 (four) times daily., Disp: 100 Syringe, Rfl: 12;  lancets (ONE TOUCH DELICA LANCETS) Misc, Ultra 2 lancets to check blood sugar BID, Disp: 100 each, Rfl: 6;  LANTUS SOLOSTAR 100 unit/mL (3 mL) InPn pen, , Disp: , Rfl: 3  LIDODERM 5 %(700 mg/patch), APPLY 1 PATCH TO SKIN DAILY (LEAVING ON FOR 12 HOURS AND OFF FOR 12 HOURS), Disp: 30 patch, Rfl: 6;  magnesium oxide (MAG-OX) 400 mg tablet, TAKE 1 TABLET (400 MG TOTAL) BY MOUTH 2 (TWO) TIMES DAILY., Disp: 60 tablet, Rfl: 11;  methocarbamol (ROBAXIN) 750 MG Tab, TAKE 1 TO 2 TABLETS BY MOUTH 3 TIMES A DAY (Patient taking differently: Take 2 tablets twice daily), Disp: 120 tablet, Rfl: 3  methocarbamol (ROBAXIN) 750 MG Tab, TAKE 1 TO 2 TABLETS BY MOUTH 3 TIMES A DAY, Disp: 120 " tablet, Rfl: 3;  methylPREDNISolone (MEDROL DOSEPACK) 4 mg tablet, use as directed, Disp: 21 tablet, Rfl: 0;  multivitamin with minerals tablet, Take 1 tablet by mouth once daily., Disp: , Rfl: ;  pravastatin (PRAVACHOL) 40 MG tablet, TAKE 1 TABLET BY MOUTH EVERY DAY, Disp: 30 tablet, Rfl: 2  pyridoxine (B-6) 100 MG Tab, Take 1 tablet (100 mg total) by mouth once daily., Disp: 30 tablet, Rfl: 12;  scopolamine (TRANSDERM-SCOP) 1.5 mg, Place 1 patch (1.5 mg total) onto the skin every 72 hours., Disp: 4 patch, Rfl: 0;  sulfamethoxazole-trimethoprim 800-160mg (BACTRIM DS) 800-160 mg Tab, Take 1 tablet by mouth 2 (two) times daily., Disp: , Rfl: 0  sumatriptan (IMITREX) 100 MG tablet, TAKE 1 TABLET (100 MG TOTAL) BY MOUTH ONCE., Disp: 9 tablet, Rfl: 6;  SYNTHROID 125 mcg tablet, TAKE 1 TABLET BY MOUTH DAILY, Disp: 90 tablet, Rfl: 4;  topiramate (TOPAMAX) 100 MG tablet, TAKE 1 TABLET (100 MG TOTAL) BY MOUTH 2 (TWO) TIMES DAILY., Disp: 60 tablet, Rfl: 11;  topiramate (TOPAMAX) 25 MG tablet, Take 4 tablets (100 mg total) by mouth 2 (two) times daily., Disp: 240 tablet, Rfl: 5  venlafaxine (EFFEXOR-XR) 150 MG Cp24, TAKE 1 CAPSULE BY MOUTH ONCE DAILY, Disp: 30 capsule, Rfl: 2;  vitamin D (VITAMIN D3) 185 MG Tab, Take 185 mg by mouth once daily., Disp: , Rfl:         Review of Systems   Constitutional: Negative.  Negative for chills and fever.   HENT: Negative for facial swelling and trouble swallowing.    Eyes: Negative for visual disturbance.   Respiratory: Negative for cough, chest tightness, shortness of breath and wheezing.    Cardiovascular: Negative for chest pain and palpitations.   Gastrointestinal: Negative for abdominal pain, constipation, nausea and vomiting.   Genitourinary: Positive for difficulty urinating. Negative for dysuria, hematuria, urgency and vaginal bleeding.        SPT draining well     Musculoskeletal: Positive for arthralgias and gait problem.   Skin: Negative for rash.   Neurological: Positive for  weakness. Negative for dizziness and headaches.   Hematological: Does not bruise/bleed easily.   Psychiatric/Behavioral: Negative for behavioral problems.       Objective:      Physical Exam   Nursing note and vitals reviewed.  Constitutional: She is oriented to person, place, and time. Vital signs are normal. She appears well-developed and well-nourished. She is active and cooperative.   HENT:   Head: Normocephalic.   Right Ear: External ear normal.   Left Ear: External ear normal.   Nose: Nose normal.   Eyes: Conjunctivae and lids are normal. Right eye exhibits no discharge. Left eye exhibits no discharge. No scleral icterus.   Neck: Trachea normal and normal range of motion. Neck supple. No tracheal deviation present.   Cardiovascular: Normal rate, regular rhythm and intact distal pulses.    Pulmonary/Chest: Effort normal. No respiratory distress.   Abdominal: Soft. Bowel sounds are normal. She exhibits no distension and no mass. There is no tenderness. There is no rebound and no guarding.       Musculoskeletal: She exhibits no edema.   Neurological: She is alert and oriented to person, place, and time.   Skin: Skin is warm, dry and intact.         Assessment:       1. Neurogenic bladder    2. Encounter for care or replacement of suprapubic tube    3. Chronic suprapubic catheter        Plan:           I spent 25 minutes with the patient of which more than half was spent in direct consultation with the patient in regards to our treatment and plan.    Education and recommendations of today's plan of care including home remedies.  Old SPT easily removed.   Stoma prepped with betadine.   New 16fr SPT eaplaced; urine received and sent to lab for culture.  Irrigated the bladder.  Balloon inflated 9cc sterile water.  Tolerated well  I gave skin barrier cream to apply to surrounding tissue.

## 2019-10-31 ENCOUNTER — PATIENT MESSAGE (OUTPATIENT)
Dept: INTERNAL MEDICINE | Facility: CLINIC | Age: 69
End: 2019-10-31

## 2019-10-31 ENCOUNTER — PATIENT MESSAGE (OUTPATIENT)
Dept: PHYSICAL MEDICINE AND REHAB | Facility: CLINIC | Age: 69
End: 2019-10-31

## 2019-10-31 DIAGNOSIS — M79.7 FIBROMYALGIA: ICD-10-CM

## 2019-10-31 DIAGNOSIS — G89.29 CHRONIC MIDLINE LOW BACK PAIN WITHOUT SCIATICA: ICD-10-CM

## 2019-10-31 DIAGNOSIS — M54.2 CHRONIC NECK PAIN: ICD-10-CM

## 2019-10-31 DIAGNOSIS — M54.50 CHRONIC MIDLINE LOW BACK PAIN WITHOUT SCIATICA: ICD-10-CM

## 2019-10-31 DIAGNOSIS — G89.29 CHRONIC NECK PAIN: ICD-10-CM

## 2019-10-31 NOTE — TELEPHONE ENCOUNTER
Any chance she could sign up for Digital HTN?  She would be monitored closer for the elevations     In the interim, would take the clonidine if BP > 150 systolic  ( instead of >170 as now recommended)    Or >98 diastolic  rec she continue to document BP readings and   Book her for [facility] on Tuesday , looks like one 3 pm left

## 2019-11-01 RX ORDER — HYDROCODONE BITARTRATE AND ACETAMINOPHEN 10; 325 MG/1; MG/1
1 TABLET ORAL EVERY 6 HOURS PRN
Qty: 120 TABLET | Refills: 0 | OUTPATIENT
Start: 2019-11-01 | End: 2019-12-01

## 2019-11-02 ENCOUNTER — PATIENT MESSAGE (OUTPATIENT)
Dept: PHYSICAL MEDICINE AND REHAB | Facility: CLINIC | Age: 69
End: 2019-11-02

## 2019-11-02 DIAGNOSIS — M79.7 FIBROMYALGIA: ICD-10-CM

## 2019-11-02 DIAGNOSIS — G89.29 CHRONIC MIDLINE LOW BACK PAIN WITHOUT SCIATICA: ICD-10-CM

## 2019-11-02 DIAGNOSIS — M54.2 CHRONIC NECK PAIN: ICD-10-CM

## 2019-11-02 DIAGNOSIS — M54.50 CHRONIC MIDLINE LOW BACK PAIN WITHOUT SCIATICA: ICD-10-CM

## 2019-11-02 DIAGNOSIS — G89.29 CHRONIC NECK PAIN: ICD-10-CM

## 2019-11-03 ENCOUNTER — NURSE TRIAGE (OUTPATIENT)
Dept: ADMINISTRATIVE | Facility: CLINIC | Age: 69
End: 2019-11-03

## 2019-11-03 NOTE — TELEPHONE ENCOUNTER
"Patient is requesting a refil of her pain medication. Patient states she has taken 3 muscle relaxer. I have advised patient she should take medication as prescribed and for severe pain she should go to the ED.    Reason for Disposition   Caller requesting a NON-URGENT new prescription or refill and triager unable to refill per unit policy    Additional Information   Negative: Drug overdose and nurse unable to answer question   Negative: Caller requesting information not related to medicine   Negative: Caller requesting a prescription for Strep throat and has a positive culture result   Negative: Rash while taking a medication or within 3 days of stopping it   Negative: Immunization reaction suspected   Negative: [1] Asthma AND [2] having symptoms of asthma (cough, wheezing, etc)   Negative: MORE THAN A DOUBLE DOSE of a prescription or over-the-counter (OTC) drug   Negative: [1] DOUBLE DOSE (an extra dose or lesser amount) of over-the-counter (OTC) drug AND [2] any symptoms (e.g., dizziness, nausea, pain, sleepiness)   Negative: [1] DOUBLE DOSE (an extra dose or lesser amount) of prescription drug AND [2] any symptoms (e.g., dizziness, nausea, pain, sleepiness)   Negative: Took another person's prescription drug   Negative: [1] DOUBLE DOSE (an extra dose or lesser amount) of prescription drug AND [2] NO symptoms (Exception: a double dose of antibiotics)   Negative: Diabetes drug error or overdose (e.g., insulin or extra dose)   Negative: [1] Request for URGENT new prescription or refill of "essential" medication (i.e., likelihood of harm to patient if not taken) AND [2] triager unable to fill per unit policy   Negative: [1] Prescription not at pharmacy AND [2] was prescribed today by PCP   Negative: Pharmacy calling with prescription questions and triager unable to answer question   Negative: Caller has urgent medication question about med that PCP prescribed and triager unable to answer question   " Negative: Caller has NON-URGENT medication question about med that PCP prescribed and triager unable to answer question    Protocols used: MEDICATION QUESTION CALL-A-AH

## 2019-11-04 RX ORDER — HYDROCODONE BITARTRATE AND ACETAMINOPHEN 10; 325 MG/1; MG/1
1 TABLET ORAL EVERY 6 HOURS PRN
Qty: 120 TABLET | Refills: 0 | Status: SHIPPED | OUTPATIENT
Start: 2019-11-04 | End: 2019-12-02 | Stop reason: SDUPTHER

## 2019-11-04 NOTE — TELEPHONE ENCOUNTER
duplicated message      ----- Message from Moo Opal sent at 2019  9:33 AM CST -----  Contact: Pt  Rx Refill/Request     Is this a Refill or New Rx:  Refill    Rx Name and Strength:  HYDROcodone-acetaminophen (NORCO)  mg per tablet (    Preferred Pharmacy with phone number: [id number] - [place], [place] - [address] [place] [address] AT [facility] [address] [place]- Phone # [phone]    Communication Preference:[phone]    Additional Information:

## 2019-11-06 ENCOUNTER — PATIENT MESSAGE (OUTPATIENT)
Dept: INTERNAL MEDICINE | Facility: CLINIC | Age: 69
End: 2019-11-06

## 2019-11-08 RX ORDER — BUTALBITAL, ACETAMINOPHEN AND CAFFEINE 50; 325; 40 MG/1; MG/1; MG/1
TABLET ORAL
Qty: 10 TABLET | Refills: 0 | Status: SHIPPED | OUTPATIENT
Start: 2019-11-08 | End: 2019-12-07 | Stop reason: SDUPTHER

## 2019-11-08 RX ORDER — CLONIDINE HYDROCHLORIDE 0.1 MG/1
TABLET ORAL
Qty: 90 TABLET | Refills: 1 | Status: SHIPPED | OUTPATIENT
Start: 2019-11-08 | End: 2019-11-22

## 2019-11-14 ENCOUNTER — TELEPHONE (OUTPATIENT)
Dept: PHARMACY | Facility: CLINIC | Age: 69
End: 2019-11-14

## 2019-11-18 ENCOUNTER — OUTPATIENT CASE MANAGEMENT (OUTPATIENT)
Dept: ADMINISTRATIVE | Facility: OTHER | Age: 69
End: 2019-11-18

## 2019-11-18 ENCOUNTER — PATIENT MESSAGE (OUTPATIENT)
Dept: PHYSICAL MEDICINE AND REHAB | Facility: CLINIC | Age: 69
End: 2019-11-18

## 2019-11-18 DIAGNOSIS — G89.29 CHRONIC MIDLINE LOW BACK PAIN WITHOUT SCIATICA: ICD-10-CM

## 2019-11-18 DIAGNOSIS — G89.29 CHRONIC NECK PAIN: ICD-10-CM

## 2019-11-18 DIAGNOSIS — M79.7 FIBROMYALGIA: ICD-10-CM

## 2019-11-18 DIAGNOSIS — M54.50 CHRONIC MIDLINE LOW BACK PAIN WITHOUT SCIATICA: ICD-10-CM

## 2019-11-18 DIAGNOSIS — M54.2 CHRONIC NECK PAIN: ICD-10-CM

## 2019-11-18 RX ORDER — METHOCARBAMOL 750 MG/1
750-1500 TABLET, FILM COATED ORAL 3 TIMES DAILY PRN
Qty: 180 TABLET | Refills: 3 | Status: SHIPPED | OUTPATIENT
Start: 2019-11-18 | End: 2020-03-18 | Stop reason: SDUPTHER

## 2019-11-19 ENCOUNTER — PATIENT MESSAGE (OUTPATIENT)
Dept: DIABETES | Facility: CLINIC | Age: 69
End: 2019-11-19

## 2019-11-19 ENCOUNTER — PATIENT MESSAGE (OUTPATIENT)
Dept: INTERNAL MEDICINE | Facility: CLINIC | Age: 69
End: 2019-11-19

## 2019-11-19 RX ORDER — AMLODIPINE BESYLATE 5 MG/1
5 TABLET ORAL DAILY
Qty: 90 TABLET | Refills: 1 | Status: SHIPPED | OUTPATIENT
Start: 2019-11-19 | End: 2019-11-25 | Stop reason: SDUPTHER

## 2019-11-20 ENCOUNTER — PATIENT MESSAGE (OUTPATIENT)
Dept: INTERNAL MEDICINE | Facility: CLINIC | Age: 69
End: 2019-11-20

## 2019-11-20 RX ORDER — INSULIN GLARGINE 300 [IU]/ML
INJECTION, SOLUTION SUBCUTANEOUS
Qty: 2 SYRINGE | Refills: 6 | Status: SHIPPED | OUTPATIENT
Start: 2019-11-20 | End: 2020-01-30

## 2019-11-22 ENCOUNTER — TELEPHONE (OUTPATIENT)
Dept: INTERNAL MEDICINE | Facility: CLINIC | Age: 69
End: 2019-11-22

## 2019-11-22 ENCOUNTER — OUTPATIENT CASE MANAGEMENT (OUTPATIENT)
Dept: ADMINISTRATIVE | Facility: OTHER | Age: 69
End: 2019-11-22

## 2019-11-22 RX ORDER — CLONIDINE HYDROCHLORIDE 0.1 MG/1
0.1 TABLET ORAL EVERY 8 HOURS PRN
Qty: 90 TABLET | Refills: 1
Start: 2019-11-22 | End: 2020-02-07 | Stop reason: SDUPTHER

## 2019-11-22 NOTE — PROGRESS NOTES
Outpatient Care Management  Plan of Care Follow Up Visit    Patient: Cecile Bowen  MRN: [id number]  Date of Service: 11/22/2019  Completed by: Ezequiel Cornejo RN  Referral Date: 09/25/2019  Program: Disease Management (Diabetes & COPD)    Reason for Visit   Patient presents with    Update Plan Of Care     11/22/19       Brief Summary: Contacted patient for F/U.  Reported that her BG readings are better controlled with her new medication regimen. Stated that her most recent reading was 84.  Continues to experience BP issues, most recent BP reading was 163/105.  Patient started to amlodipine yesterday as directed by her PCP.  Would like to know if she is still allowed to take her PRN Clonidine with amlodipine to help with management of her BP.  Will sent message to PCP with patient concerns for further assistance. Patient appreciative of outreach.     Patient Summary     Involvement of Care:  Do I have permission to speak with other family members about your care?   YES, sister    Problem List     Patient Active Problem List   Diagnosis    Hypertension associated with diabetes    Hypothyroidism    Fibromyalgia    Intractable chronic migraine without aura    Vitamin D deficiency disease    Hyperlipidemia associated with type 2 diabetes mellitus    VIJAYA (obstructive sleep apnea)    Abnormality of gait and mobility    Osteoarthritis of lumbar spine    Sideroblastic anemia    Iron deficiency anemia    Primary osteoarthritis involving multiple joints    Urinary retention    Neurogenic bladder    Major depressive disorder, recurrent episode, moderate    Insomnia    Mixed migraine and muscle contraction headache    Secondary hyperparathyroidism, renal    Uncontrolled type 2 diabetes mellitus with chronic kidney disease, with long-term current use of insulin    Chronic suprapubic catheter    Pulmonary nodule    Osteopenia    Morbid obesity due to excess calories    Chronic daily headache    Long term  prescription opiate use    Lumbar spondylosis    Chronic pain    Hydronephrosis    CKD stage 4 due to type 2 diabetes mellitus    Cervicogenic migraine    Malignant neoplasm of left breast, estrogen receptor positive    Risk for falls    Facet arthritis of lumbar region    Obesity, diabetes, and hypertension syndrome    Requires daily assistance for activities of daily living (ADL) and comfort needs    Presence of urostomy    S/P left mastectomy       Reviewed Active Problem List with patient and/or Caregiver.    Patient Reported Labs & Vitals:  1.  Any Patient Reported Labs & Vitals?  Yes  2.  Patient Reported Blood Pressure:  163/105  3.  Patient Reported Pulse:     4.  Patient Reported Weight (Kg):     5.  Patient Reported Blood Glucose (mg/dl):  84    Medical History:  Reviewed medical history with patient and/or caregiver    Social History:  Reviewed social history with patient and/or caregiver    Clinical Assessment     Reviewed and provided basic information on available community resources for mental health, transportation, wellness resources, and palliative care programs with patient and/or caregiver.    Complex Care Plan     Care plan was discussed and completed today with input from patient and/or caregiver.    Goals      Patient/caregiver will accept life style changes to manage and improve CKD prior to discharge from OPCM. - Priority: High      Overall Time to Completion  3 months from 10/15/2019     OPCM Identified Patient Barriers:  Advanced Care Planning:  No  Housing:  no  Lab Adherence:  no  Cognitive/Behavioral Health:  no  Communication:  no  Health Literacy:  no  Equipment/Supplies/Services:  yes  Culural/Congregation Beliefs:  no  Fall Risk:  Neg     OPCM Identified Disease Education Barriers:  Hypertension:  Pos  Chronic Kidney Disease:  Pos       Short Term Goals    Patient/caregiver will verbalize current eGFR value and eGFR goal within 1 month.  Interventions   · Review current  eGFR with patient 27.5.   · Review eGFR goal with patient, >60.     10/29/19-Patient/Caregiver agrees to review resource material on CKD by  Next encounter.  Patient/Caregiver agrees to OPCM follow up within 2 weeks to assess progress to goal.        Status  · Partially met      Patient/caregiver will verbalize 2 signs and symptoms of Kidney Disease that would necessitate a call to healthcare provider or 911 within 2 months.    Signs and Symptoms of Kidney Disease   Call your healthcare provider right away if any of these occur:   Nausea or vomiting   Fever of 100.4°F (38°C) or higher, or as directed by your healthcare provider   Unexpected weight gain or swelling in the legs, ankles, or around the eyes   Decrease or absent urine output   Call 911 if you have any of the following:   Severe weakness, dizziness, fainting, drowsiness, or confusion   Chest pain or shortness of breath   Heart beating fast, slow, or irregularly    Interventions   · Assess for retention of at least 3 signs and symptoms of Kidney Disease that would necessitate a call to healthcare provider or 911.  · Recognize and provide educational material (KRAADRIAN) on Chronic Kidney Disease. - Mailed  · Review with patient/caregiver the signs and symptoms of Kidney Disease that would necessitate a call to healthcare provider or 911.    10/29/19-Patient/Caregiver agrees to recognize 2 s/sx of kidney disease that need a call to her HCP or 911 by End of enrollment.  Patient/Caregiver agrees to OPCM follow up within 2 weeks to assess progress to goal.       Status  · Partially met    Patient/caregiver will verbalize 3 ways of preventing complications due to Kidney Disease within 2 months.   Ways of Preventing Complications of Kidney Disease  If you have diabetes, talk with your healthcare provider about keeping your blood sugar under control. Ask if you need to make and changes to your diet, lifestyle, or medicines.   If you have high blood pressure:  ? Take  prescribed medicine to lower your blood pressure to the recommended goal of less than 130/80.  ? Start a regular exercise program that you enjoy. Check with your healthcare provider to be sure your planned exercise program is right for you.  ? Eat less salt (sodium). Your healthcare provider can tell you how much salt per day is safe for you.  If you are overweight, talk with your healthcare provider about a weight loss plan.  Most people with CKD need to follow a special diet.  Be sure you understand yours. In general, you will need to limit protein, salt, potassium, and phosphorus. You also need to limit how much fluid you drink.   CKD is a risk factor for heart disease. Talk with your healthcare provider about any other risk factors you might have and what you can do to lessen them.  Talk with your healthcare provider about any medicines you are taking to find out if they need to be reduced or stopped.  Don't use the following over-the-counter medicines, or consult your healthcare provider before using:  ? Aspirin and NSAIDs such as ibuprofen or naproxen. Using acetaminophen for fever or pain is OK.  ? Laxatives and antacids containing magnesium or aluminum  ? Fleet or phospho soda enemas containing phosphorus  ? Certain stomach acid-blocking medicine such as cimetidine or ranitidine   ? Decongestants containing pseudoephedrine   ? Herbal supplements    Interventions   · Assess for retention of at least 3 ways of preventing complications due to Kidney Disease.    · Empower patient/caregiver to discuss treatment plan with Physician/care team. Ongoing  · Educate on importance of compliance with annual vaccinations. Done 10/15  · Educate on importance of compliance with Physician follow-ups. Ongoing  · Educate on importance of compliance with preventive screenings. Ongoing  · Educate on importance of compliance with scheduled laboratory testing and procedure ongoing.  · Educate on importance of Dietary Compliance.  Ongoing  · Educate on importance of Medication Compliance. Ongoing  · Recognize and provide educational material (KRAADRIAN) on Chronic Kidney Disease. - Mailed  · Review with patient/caregiver ways of prevention complications due to Kidney Disease.       Status  · Partially met               Clinical Reference Documents Added to Patient Instructions       Document    CHRONIC KIDNEY DISEASE (CKD) (ENGLISH)    CHRONIC KIDNEY DISEASE, DIET FOR (ENGLISH)    HEART-HEALTHY FOOD, EATING: USING THE DASH PLAN (ENGLISH)    HIGH BLOOD PRESSURE, CONTROLLING (ENGLISH)    KIDNEY DISEASE, ANEMIA AND (ENGLISH)    KIDNEY DISEASE, HIGH BLOOD PRESSURE AND (ENGLISH)    STROKE AND HIGH BLOOD PRESSURE, UNDERSTANDING THE LINK BETWEEN (ENGLISH)             Patient/caregiver will check and record blood pressure daily. If >140/90 for 3 days, patient/caregiver will know to notify Physician, prior to discharge from OPCM - Priority: High      Overall Time to Completion  2 months from 10/15/2019     OPCM Identified Patient Barriers:  NA    OPCM Identified Education Barriers:  Education Barrier for Hypertension--Care Plan Created  Education Barrier for CKD--Care Plan Created      Short Term Goals  Patient/caregiver will measure and record the blood pressure 1 times per day for 2 weeks.  Interventions   · Assess for availability of working blood pressure cuff in home setting. Done 10/15  · Assess for retention of the signs and symptoms of disease specific exacerbation. Done 10/29  · Collaborate with Physician as appropriate to meet patient needs. Ongoing  · Mail Blood pressure logs for home use. Done 10/15    10/15/19-Patient/Caregiver agrees to check and log BP daily by next encounter. (10/29-patient reported that received the BP logs and has checking using them to monitor her BP).   Patient/Caregiver agrees to OPCM follow up within 2 weeks to assess progress to goal.-DONE 10/29     Status  · Partially met      Patient/caregiver will verbalize 2  red flags of Hypertension/Hypotension and know when to contact Physician within 2 weeks.  Interventions   · Assess for retention of the signs and symptoms of disease specific exacerbation. Done 10/29  · Collaborate with Physician as appropriate to meet patient needs. Ongoing      10/29/19-Patient/Caregiver agrees to recognize 2 red flags of Hypo/hypertension by end of enrollment.  Patient/Caregiver agrees to OPCM follow up within 2 weeks to assess progress to goal.  (11/22-patient has vaibhav experiencing headaches and high BP readings, has been in frequent contact with her PCP for management of hypertension)     Status  · Partially met      Patient/caregiver will verbalize 2 signs and symptoms of Hypertension/Hypotension within 3 weeks.   Interventions   Recognize and provide educational material (KARINE).Mailed 10/14    11/22-Patient/Caregiver agrees to recognize 2 s/sx of hypertension by  End of OPCM enrollment. (IN PROGRESS)  Patient/Caregiver agrees to OPCM follow up within 2 weeks to assess progress to goal.      Status  · Partially met           Clinical Reference Documents Added to Patient Instructions       Document    CHRONIC KIDNEY DISEASE (CKD) (ENGLISH)    CHRONIC KIDNEY DISEASE, DIET FOR (ENGLISH)    HEART-HEALTHY FOOD, EATING: USING THE DASH PLAN (ENGLISH)    HIGH BLOOD PRESSURE, CONTROLLING (ENGLISH)    KIDNEY DISEASE, ANEMIA AND (ENGLISH)    KIDNEY DISEASE, HIGH BLOOD PRESSURE AND (ENGLISH)    STROKE AND HIGH BLOOD PRESSURE, UNDERSTANDING THE LINK BETWEEN (ENGLISH)             Patient/caregiver will have knowledge of resources available in order to obtain the services that are needed prior to discharge from OPCM. - Priority: High      Overall Time to Completion  2 months from 10/15/2019     OPCM Identified Patient Barriers:      OPCM Identified Education Barriers:  Education Barrier for Hypertension--Care Plan Created  Education Barrier for CKD--Care Plan Created      Short Term Goals    Patient/caregiver will fill out any applications that were sent to patient to complete from OPC  within 1 month.  Interventions   · Refer to Outpatient Case Management Social Worker. Done 10/14, 10/29-pending outreach     Status  · Partially met      Patient/caregiver will have contact information for identified community resources IE:OPCM  for follow-up within 1 month.  Interventions   · Refer to Outpatient Case Management Social Worker. Done 10/14, 10/29-pending outreach     Status  · Partially met      Patient/caregiver will notify RN CCM if patient does not hear from OPC  within 2 weeks.  Interventions   · Refer to Outpatient Case Management Social Worker. Done 10/14, 10/29-pending outreach     Status  · Partially met                Patient Instructions     Instructions were provided via the [employer] patient resources-mailed upon \[facility]\"" enrollment.    Interventions:  Chart review completed  Confirmed compliance with new diabetic medication regimen and new HTN medication (amlodipine)  Message sent to Dr. Moon with questions about continued use of Clonidine.   Encourage compliance with medications and treatment regimen  Inquired about current BG monitoring (most recent was 84)  Confirmed compliance with BP monitoring and logging (this morning was 163/105)        Plan:  F/U in 2 weeks as discussed  F/U on receipt of resource materials (recevied 10/29)  Continue education on Hypertension. Review s/sx of Hypo/hypertension. Encourage compliance with medication and treatment regimen. Encourage attendance to appts.  Continue education on CKD  Collaborate with \[facility]\"" LMSW to assist with needs  Collaborate with providers to meet needs  Mail Med list after completion of med Rec (Done 10/29)    Follow up in about 13 days (around 12/5/2019) for RN Follow UP.      Todays \[facility]\"" Self-Management Care Plan was developed with the patients/caregivers input and was based on  identified barriers from todays assessment.  Goals were written today with the patient/caregiver and the patient has agreed to work towards these goals to improve his/her overall well-being. Patient verbalized understanding of the care plan, goals, and all of today's instructions. Encouraged patient/caregiver to communicate with his/her physician and health care team about health conditions and the treatment plan.  Provided my contact information today and encouraged patient/caregiver to call me with any questions as needed.

## 2019-11-22 NOTE — TELEPHONE ENCOUNTER
Reviewed w/ pt  She is taking amlodipine 5mg qd w/ improvement  Was running 180's, now 160's  Will continue to take clonidine for BP > 150 every 8 hours  Pt to call Monday w/ f/up BP readings

## 2019-11-22 NOTE — TELEPHONE ENCOUNTER
----- Message from Ezequiel Cornejo RN sent at 11/22/2019  2:59 PM CST -----  Contact: Ezequiel Cornejo RN Outpatient Case Management  Good afternoon,      My name is Ezequiel and I work with the outpatient case management team.  I spoke with Ms. Bowen this afternoon.  She reported that she started taking her Amlodipine as directed yesterday.  She reported that her BP this morning was 163/105.  She is aware that it will take some time for her new medication to start working.  She would like to know if she is allowed to still take her Catapres to bring her pressure down while taking the amlodipine.  Can you please contact her for further advice?        Thank you!  Ezequiel Cornejo RN

## 2019-11-25 ENCOUNTER — PATIENT MESSAGE (OUTPATIENT)
Dept: INTERNAL MEDICINE | Facility: CLINIC | Age: 69
End: 2019-11-25

## 2019-11-25 RX ORDER — INSULIN LISPRO 100 [IU]/ML
INJECTION, SOLUTION INTRAVENOUS; SUBCUTANEOUS
Qty: 30 ML | Refills: 4 | Status: SHIPPED | OUTPATIENT
Start: 2019-11-25 | End: 2020-04-29

## 2019-11-25 RX ORDER — AMLODIPINE BESYLATE 10 MG/1
10 TABLET ORAL DAILY
Qty: 90 TABLET | Refills: 1 | Status: SHIPPED | OUTPATIENT
Start: 2019-11-25 | End: 2020-08-28

## 2019-11-26 ENCOUNTER — OFFICE VISIT (OUTPATIENT)
Dept: UROLOGY | Facility: CLINIC | Age: 69
End: 2019-11-26
Payer: MEDICARE

## 2019-11-26 VITALS
DIASTOLIC BLOOD PRESSURE: 78 MMHG | WEIGHT: 291 LBS | HEART RATE: 100 BPM | SYSTOLIC BLOOD PRESSURE: 164 MMHG | BODY MASS INDEX: 44.1 KG/M2 | HEIGHT: 68 IN

## 2019-11-26 DIAGNOSIS — Z43.5 ENCOUNTER FOR CARE OR REPLACEMENT OF SUPRAPUBIC TUBE: ICD-10-CM

## 2019-11-26 DIAGNOSIS — N31.9 NEUROGENIC BLADDER: Primary | ICD-10-CM

## 2019-11-26 DIAGNOSIS — Z93.59 CHRONIC SUPRAPUBIC CATHETER: ICD-10-CM

## 2019-11-26 DIAGNOSIS — R33.9 URINARY RETENTION: ICD-10-CM

## 2019-11-26 PROCEDURE — 1159F MED LIST DOCD IN RCRD: CPT | Mod: ,,, | Performed by: NURSE PRACTITIONER

## 2019-11-26 PROCEDURE — 1159F PR MEDICATION LIST DOCUMENTED IN MEDICAL RECORD: ICD-10-PCS | Mod: ,,, | Performed by: NURSE PRACTITIONER

## 2019-11-26 PROCEDURE — 51705 PR CHANGE OF BLADDER TUBE,SIMPLE: ICD-10-PCS | Mod: S$PBB,,, | Performed by: NURSE PRACTITIONER

## 2019-11-26 PROCEDURE — 99214 OFFICE O/P EST MOD 30 MIN: CPT | Mod: S$PBB,25,, | Performed by: NURSE PRACTITIONER

## 2019-11-26 PROCEDURE — 51705 CHANGE OF BLADDER TUBE: CPT | Mod: PBBFAC | Performed by: NURSE PRACTITIONER

## 2019-11-26 PROCEDURE — 99999 PR PBB SHADOW E&M-EST. PATIENT-LVL V: ICD-10-PCS | Mod: PBBFAC,,, | Performed by: NURSE PRACTITIONER

## 2019-11-26 PROCEDURE — 99215 OFFICE O/P EST HI 40 MIN: CPT | Mod: PBBFAC,25 | Performed by: NURSE PRACTITIONER

## 2019-11-26 PROCEDURE — 1125F AMNT PAIN NOTED PAIN PRSNT: CPT | Mod: ,,, | Performed by: NURSE PRACTITIONER

## 2019-11-26 PROCEDURE — 99999 PR PBB SHADOW E&M-EST. PATIENT-LVL V: CPT | Mod: PBBFAC,,, | Performed by: NURSE PRACTITIONER

## 2019-11-26 PROCEDURE — 1125F PR PAIN SEVERITY QUANTIFIED, PAIN PRESENT: ICD-10-PCS | Mod: ,,, | Performed by: NURSE PRACTITIONER

## 2019-11-26 PROCEDURE — 99214 PR OFFICE/OUTPT VISIT, EST, LEVL IV, 30-39 MIN: ICD-10-PCS | Mod: S$PBB,25,, | Performed by: NURSE PRACTITIONER

## 2019-11-26 PROCEDURE — 51705 CHANGE OF BLADDER TUBE: CPT | Mod: S$PBB,,, | Performed by: NURSE PRACTITIONER

## 2019-11-26 RX ORDER — OXYBUTYNIN CHLORIDE 10 MG/1
10 TABLET, EXTENDED RELEASE ORAL DAILY
Refills: 3 | COMMUNITY
Start: 2019-11-05 | End: 2021-03-02 | Stop reason: SDUPTHER

## 2019-11-26 NOTE — PROGRESS NOTES
Subjective:       Patient ID: Cecile Bowen is a 69 y.o. female.    Chief Complaint: Neurogenic Bladder (chronic SPT)    Cecile Bowen is a 69 y.o. Diabetic Female with history of bilateral hydronephrosis, incomplete bladder emptying which is managed by SPT (16fr).  S/p Cystoscopy with bladder botox injection (300u) 09/17/2018 with Dr. Whittington.    She had recently discovered breast cancer; s/p Mastectomy 08/01/2019  Malignant neoplasm of upper-outer quadrant of left breast in female, estrogen receptor positive   No need for chemo/radiation therapy.    Her last SPT change was 10/30/2019.    Here today for scheduled SPT change.  Bladder spasms present but greatly reduced with Botox 08/26/2019 with Dr. Whittington.  No fever, n/v    She reports improved migraines with new medication    S/p Mastectomy 08/01/2019    She just returned from a 7 day cruise with her sister.                Past Medical History:    Diabetes type 2, uncontrolled                   12/12/2012    Obesity                                         12/12/2012    Hypertension                                    12/12/2012    DJD (degenerative joint disease)                12/12/2012    Hypothyroidism                                  12/12/2012    Nuclear sclerosis - Both Eyes                   3/24/2014     Migraine                                                      Past Surgical History:    TOTAL ABDOMINAL HYSTERECTOMY W/ BILATERAL SALP*  1985          GALLBLADDER SURGERY                              2006          TONSILLECTOMY, ADENOIDECTOMY                                   OVARIAN CYST SURGERY                             1985          HYSTERECTOMY                                                   DILATION AND CURETTAGE OF UTERUS                               Review of patient's family history indicates:    Diabetes                       Sister                    Kidney disease                 Sister                    ALS                       "      Mother                      Comment: d.    Cancer                         Maternal Grandmother        Comment: león. colon    Cancer                         Paternal Grandfather        Comment: d. lung    Diabetes                       Maternal Aunt             Kidney disease                 Maternal Aunt             Diabetes                       Maternal Uncle            Amblyopia                      Neg Hx                    Blindness                      Neg Hx                    Cataracts                      Neg Hx                    Glaucoma                       Neg Hx                    Macular degeneration           Neg Hx                    Retinal detachment             Neg Hx                    Strabismus                     Neg Hx                      Social History    Marital Status:             Spouse Name:                       Years of Education:                 Number of children:               Occupational History    None on file    Social History Main Topics    Smoking Status: Never Smoker                      Smokeless Status: Never Used                        Alcohol Use: No              Drug Use: No              Sexual Activity: Not Currently           Birth Control/Protection: Abstinence    Other Topics            Concern    None on file    Social History Narrative    Single      Lives w/ sister  And brother     both are helping her during her post hosp recovery period        Allergies:  Review of patient's allergies indicates no known allergies.    Medications:  Current outpatient prescriptions:amitriptyline (ELAVIL) 10 MG tablet, TAKE 3 TABLETS BY MOUTH IN THE EVENING, Disp: 90 tablet, Rfl: 5;  aspirin (ECOTRIN) 81 MG EC tablet, Take 81 mg by mouth once daily., Disp: , Rfl: ;  BD INSULIN PEN NEEDLE UF SHORT 31 X 5/16 " Ndle, USE ONCE DAILY WITH LANTUS SOLOSTAR PEN INSULIN, Disp: 100 each, Rfl: 11  butalbital-acetaminophen-caffeine -40 mg (FIORICET, ESGIC) -40 mg per " "tablet, TAKE 1 TABLET EVERY 4 TO 6 HOURS, Disp: 30 tablet, Rfl: 0;  cyanocobalamin, vitamin B-12, (VITAMIN B-12) 2,500 mcg Subl, Place 2,500 mcg under the tongue once daily., Disp: , Rfl: ;  diphenhydrAMINE (BENADRYL) 25 mg capsule, Take 25 mg by mouth every 6 (six) hours as needed., Disp: , Rfl:   ferrous sulfate 325 (65 FE) MG EC tablet, Take 325 mg by mouth 3 (three) times daily with meals., Disp: , Rfl: ;  fluticasone (FLONASE) 50 mcg/actuation nasal spray, 1 SPRAY IN EACH NOSTRIL DAILY (Patient taking differently: 1 SPRAY IN EACH NOSTRIL DAILY PRN), Disp: 16 g, Rfl: 1;  furosemide (LASIX) 20 MG tablet, Take 1 tablet (20 mg total) by mouth once daily., Disp: 4 tablet, Rfl: 0  gemfibrozil (LOPID) 600 MG tablet, TAKE 1 TABLET BY MOUTH TWICE A DAY, Disp: 60 tablet, Rfl: 5;  (START ON 4/29/2015) hydrocodone-acetaminophen 10-325mg (NORCO)  mg Tab, Take 1 tablet by mouth every 6 (six) hours as needed., Disp: 120 tablet, Rfl: 0;  insulin lispro (HUMALOG) 100 unit/mL injection, Inject 7 Units into the skin 3 (three) times daily before meals., Disp: 6.3 mL, Rfl: 11  insulin syringe-needle U-100 (BD INSULIN SYRINGE ULTRA-FINE) 1/2 mL 31 x 15/64" Syrg, 1 Box by Misc.(Non-Drug; Combo Route) route 4 (four) times daily., Disp: 100 Syringe, Rfl: 12;  lancets (ONE TOUCH DELICA LANCETS) Misc, Ultra 2 lancets to check blood sugar BID, Disp: 100 each, Rfl: 6;  LANTUS SOLOSTAR 100 unit/mL (3 mL) InPn pen, , Disp: , Rfl: 3  LIDODERM 5 %(700 mg/patch), APPLY 1 PATCH TO SKIN DAILY (LEAVING ON FOR 12 HOURS AND OFF FOR 12 HOURS), Disp: 30 patch, Rfl: 6;  magnesium oxide (MAG-OX) 400 mg tablet, TAKE 1 TABLET (400 MG TOTAL) BY MOUTH 2 (TWO) TIMES DAILY., Disp: 60 tablet, Rfl: 11;  methocarbamol (ROBAXIN) 750 MG Tab, TAKE 1 TO 2 TABLETS BY MOUTH 3 TIMES A DAY (Patient taking differently: Take 2 tablets twice daily), Disp: 120 tablet, Rfl: 3  methocarbamol (ROBAXIN) 750 MG Tab, TAKE 1 TO 2 TABLETS BY MOUTH 3 TIMES A DAY, Disp: 120 " tablet, Rfl: 3;  methylPREDNISolone (MEDROL DOSEPACK) 4 mg tablet, use as directed, Disp: 21 tablet, Rfl: 0;  multivitamin with minerals tablet, Take 1 tablet by mouth once daily., Disp: , Rfl: ;  pravastatin (PRAVACHOL) 40 MG tablet, TAKE 1 TABLET BY MOUTH EVERY DAY, Disp: 30 tablet, Rfl: 2  pyridoxine (B-6) 100 MG Tab, Take 1 tablet (100 mg total) by mouth once daily., Disp: 30 tablet, Rfl: 12;  scopolamine (TRANSDERM-SCOP) 1.5 mg, Place 1 patch (1.5 mg total) onto the skin every 72 hours., Disp: 4 patch, Rfl: 0;  sulfamethoxazole-trimethoprim 800-160mg (BACTRIM DS) 800-160 mg Tab, Take 1 tablet by mouth 2 (two) times daily., Disp: , Rfl: 0  sumatriptan (IMITREX) 100 MG tablet, TAKE 1 TABLET (100 MG TOTAL) BY MOUTH ONCE., Disp: 9 tablet, Rfl: 6;  SYNTHROID 125 mcg tablet, TAKE 1 TABLET BY MOUTH DAILY, Disp: 90 tablet, Rfl: 4;  topiramate (TOPAMAX) 100 MG tablet, TAKE 1 TABLET (100 MG TOTAL) BY MOUTH 2 (TWO) TIMES DAILY., Disp: 60 tablet, Rfl: 11;  topiramate (TOPAMAX) 25 MG tablet, Take 4 tablets (100 mg total) by mouth 2 (two) times daily., Disp: 240 tablet, Rfl: 5  venlafaxine (EFFEXOR-XR) 150 MG Cp24, TAKE 1 CAPSULE BY MOUTH ONCE DAILY, Disp: 30 capsule, Rfl: 2;  vitamin D (VITAMIN D3) 185 MG Tab, Take 185 mg by mouth once daily., Disp: , Rfl:         Review of Systems   Constitutional: Negative.  Negative for chills and fever.   HENT: Negative for facial swelling and trouble swallowing.    Eyes: Negative for visual disturbance.   Respiratory: Negative for cough, chest tightness, shortness of breath and wheezing.    Cardiovascular: Negative for chest pain and palpitations.   Gastrointestinal: Negative for abdominal pain, constipation, nausea and vomiting.   Genitourinary: Positive for difficulty urinating. Negative for dysuria, hematuria, urgency and vaginal bleeding.   Musculoskeletal: Positive for arthralgias and gait problem.   Skin: Negative for rash.   Neurological: Negative for dizziness and headaches.    Hematological: Does not bruise/bleed easily.   Psychiatric/Behavioral: Negative for behavioral problems.       Objective:      Physical Exam   Nursing note and vitals reviewed.  Constitutional: She is oriented to person, place, and time. She appears well-developed and well-nourished.   HENT:   Head: Normocephalic.   Right Ear: External ear normal.   Left Ear: External ear normal.   Nose: Nose normal.   Eyes: Conjunctivae and lids are normal. Right eye exhibits no discharge. Left eye exhibits no discharge. No scleral icterus.   Neck: Trachea normal and normal range of motion. Neck supple. No tracheal deviation present.   Cardiovascular: Normal rate and intact distal pulses.    Pulmonary/Chest: Effort normal. No respiratory distress.   Abdominal: Soft. She exhibits no distension. There is no tenderness. There is no guarding.       Musculoskeletal: Normal range of motion. She exhibits no edema.   Neurological: She is alert and oriented to person, place, and time.   Skin: Skin is warm, dry and intact.     Psychiatric: She has a normal mood and affect. Her behavior is normal.       Assessment:       1. Neurogenic bladder    2. Encounter for care or replacement of suprapubic tube    3. Urinary retention    4. Chronic suprapubic catheter        Plan:         I spent 25 minutes with the patient of which more than half was spent in direct consultation with the patient in regards to our treatment and plan.    Education and recommendations of today's plan of care including home remedies.  Old SPT easily removed.   Stoma prepped with betadine.   New 16fr SPT reaplaced;  Irrigated the bladder.  Balloon inflated 9cc sterile water.  Tolerated well  Skin barrier cream to apply to surrounding tissue.  dsg applied

## 2019-11-29 ENCOUNTER — PATIENT MESSAGE (OUTPATIENT)
Dept: INTERNAL MEDICINE | Facility: CLINIC | Age: 69
End: 2019-11-29

## 2019-11-29 ENCOUNTER — PATIENT MESSAGE (OUTPATIENT)
Dept: PRIMARY CARE CLINIC | Facility: CLINIC | Age: 69
End: 2019-11-29

## 2019-12-02 ENCOUNTER — PATIENT MESSAGE (OUTPATIENT)
Dept: PHYSICAL MEDICINE AND REHAB | Facility: CLINIC | Age: 69
End: 2019-12-02

## 2019-12-02 DIAGNOSIS — M79.7 FIBROMYALGIA: ICD-10-CM

## 2019-12-02 DIAGNOSIS — M54.2 CHRONIC NECK PAIN: ICD-10-CM

## 2019-12-02 DIAGNOSIS — G89.29 CHRONIC NECK PAIN: ICD-10-CM

## 2019-12-02 DIAGNOSIS — G89.29 CHRONIC MIDLINE LOW BACK PAIN WITHOUT SCIATICA: ICD-10-CM

## 2019-12-02 DIAGNOSIS — M54.50 CHRONIC MIDLINE LOW BACK PAIN WITHOUT SCIATICA: ICD-10-CM

## 2019-12-02 RX ORDER — HYDROCODONE BITARTRATE AND ACETAMINOPHEN 10; 325 MG/1; MG/1
1 TABLET ORAL EVERY 6 HOURS PRN
Qty: 120 TABLET | Refills: 0 | Status: SHIPPED | OUTPATIENT
Start: 2019-12-04 | End: 2020-01-02 | Stop reason: SDUPTHER

## 2019-12-04 ENCOUNTER — DOCUMENTATION ONLY (OUTPATIENT)
Dept: SURGERY | Facility: CLINIC | Age: 69
End: 2019-12-04

## 2019-12-04 NOTE — PROGRESS NOTES
Phoned but unable to reach patient to discuss end of treatment summary, copy to be mailed and message/copy sent to portal

## 2019-12-07 RX ORDER — BUTALBITAL, ACETAMINOPHEN AND CAFFEINE 50; 325; 40 MG/1; MG/1; MG/1
TABLET ORAL
Qty: 10 TABLET | Refills: 0 | Status: SHIPPED | OUTPATIENT
Start: 2019-12-07 | End: 2020-01-10

## 2019-12-10 ENCOUNTER — TELEPHONE (OUTPATIENT)
Dept: UROLOGY | Facility: CLINIC | Age: 69
End: 2019-12-10

## 2019-12-10 DIAGNOSIS — N31.9 NEUROGENIC BLADDER: Primary | ICD-10-CM

## 2019-12-10 DIAGNOSIS — Z93.59 CHRONIC SUPRAPUBIC CATHETER: ICD-10-CM

## 2019-12-10 RX ORDER — CLONIDINE HYDROCHLORIDE 0.1 MG/1
TABLET ORAL
Qty: 90 TABLET | Refills: 1 | Status: SHIPPED | OUTPATIENT
Start: 2019-12-10 | End: 2020-09-02 | Stop reason: SDUPTHER

## 2019-12-11 ENCOUNTER — LAB VISIT (OUTPATIENT)
Dept: LAB | Facility: HOSPITAL | Age: 69
End: 2019-12-11
Attending: NURSE PRACTITIONER
Payer: MEDICARE

## 2019-12-11 DIAGNOSIS — E11.9 TYPE 2 DIABETES MELLITUS NOT AT GOAL: ICD-10-CM

## 2019-12-11 DIAGNOSIS — E03.9 HYPOTHYROIDISM, UNSPECIFIED TYPE: ICD-10-CM

## 2019-12-11 LAB
ALBUMIN SERPL BCP-MCNC: 3 G/DL (ref 3.5–5.2)
ALP SERPL-CCNC: 123 U/L (ref 55–135)
ALT SERPL W/O P-5'-P-CCNC: 13 U/L (ref 10–44)
ANION GAP SERPL CALC-SCNC: 11 MMOL/L (ref 8–16)
AST SERPL-CCNC: 12 U/L (ref 10–40)
BILIRUB SERPL-MCNC: 0.3 MG/DL (ref 0.1–1)
BUN SERPL-MCNC: 27 MG/DL (ref 8–23)
CALCIUM SERPL-MCNC: 9.3 MG/DL (ref 8.7–10.5)
CHLORIDE SERPL-SCNC: 103 MMOL/L (ref 95–110)
CO2 SERPL-SCNC: 23 MMOL/L (ref 23–29)
CREAT SERPL-MCNC: 2.1 MG/DL (ref 0.5–1.4)
EST. GFR  (AFRICAN AMERICAN): 27.5 ML/MIN/1.73 M^2
EST. GFR  (NON AFRICAN AMERICAN): 23.8 ML/MIN/1.73 M^2
GLUCOSE SERPL-MCNC: 273 MG/DL (ref 70–110)
POTASSIUM SERPL-SCNC: 4.4 MMOL/L (ref 3.5–5.1)
PROT SERPL-MCNC: 6.7 G/DL (ref 6–8.4)
SODIUM SERPL-SCNC: 137 MMOL/L (ref 136–145)
T4 FREE SERPL-MCNC: 1.1 NG/DL (ref 0.71–1.51)
TSH SERPL DL<=0.005 MIU/L-ACNC: 4.01 UIU/ML (ref 0.4–4)

## 2019-12-11 PROCEDURE — 36415 COLL VENOUS BLD VENIPUNCTURE: CPT

## 2019-12-11 PROCEDURE — 80053 COMPREHEN METABOLIC PANEL: CPT

## 2019-12-11 PROCEDURE — 84443 ASSAY THYROID STIM HORMONE: CPT

## 2019-12-11 PROCEDURE — 84439 ASSAY OF FREE THYROXINE: CPT

## 2019-12-11 PROCEDURE — 83036 HEMOGLOBIN GLYCOSYLATED A1C: CPT

## 2019-12-12 LAB
ESTIMATED AVG GLUCOSE: 226 MG/DL (ref 68–131)
HBA1C MFR BLD HPLC: 9.5 % (ref 4–5.6)

## 2019-12-12 RX ORDER — PEN NEEDLE, DIABETIC 30 GX3/16"
NEEDLE, DISPOSABLE MISCELLANEOUS
Qty: 100 EACH | Refills: 3 | Status: SHIPPED | OUTPATIENT
Start: 2019-12-12 | End: 2021-10-05

## 2019-12-16 ENCOUNTER — TELEPHONE (OUTPATIENT)
Dept: PHARMACY | Facility: CLINIC | Age: 69
End: 2019-12-16

## 2019-12-19 ENCOUNTER — OUTPATIENT CASE MANAGEMENT (OUTPATIENT)
Dept: ADMINISTRATIVE | Facility: OTHER | Age: 69
End: 2019-12-19

## 2019-12-19 NOTE — PROGRESS NOTES
"Outpatient Care Management  Plan of Care Follow Up Visit    Patient: Cecile Bowen  MRN: [id number]  Date of Service: 12/19/19  Completed by: Ezequiel Cornejo RN  Referral Date: 09/25/2019  Program: Disease Management (Diabetes & COPD)    Reason for Visit   Patient presents with    Update Plan Of Care     12/19/19     Brief Summary: Contacted patient for F/U.  Reported that her BG readings have improved greatly and she feels like her Toujeo is "kicking IN."  Reported some of her recent BG readings as follows: 308, 318, 325, 282, 125, 156 and 42. We reviewed how to appropriately treat Low BG readings, patient stated that she did drink some fruit juice and felt better but did not recheck her BG. Educated on the importance of rechecking her BG after treating a low BG to ensure correcting the low BG. Patient verbalized understanding. Inquired about BP monitoring. Patient reported that she is checking her BP and keeping a log of her readings.  Stated that she recently increased her Amlodipine to 10mg as directed and has seen improvements in her BP: 143/88 and 138/86. Patient is aware that she may take her Clonidine as needed as directed by her PCP for additional treatment of high BP readings. Patient reported that she is struggling with her migraine headaches.  Taking Aimovig monthly and uses her Oral migraine medications as needed. Patient stated that she has not seen her Neurologist in over a year and would like to see if she can get an appointment to review treatment options. Will message Dr. Henao's team to assist with obtaining an appt. Patient appreciative of outreach. Reminded patient of upcoming appointments, verbalized awareness. Discussed completing F/U call with patient, who is in agreement with call in 2 weeks.        Next Steps:   F/U in 2 weeks as discussed  Continue education on Hypertension and CKD.   Collaborate with [facility] to assist with needs  F/u on message to Dr. Henao requesting an " appt        Patient Summary     Involvement of Care:  Do I have permission to speak with other family members about your care?       Patient Reported Labs & Vitals:  1.  Any Patient Reported Labs & Vitals?  Yes  2.  Patient Reported Blood Pressure:  143/88  138/86  3.  Patient Reported Pulse:     4.  Patient Reported Weight (Kg):     5.  Patient Reported Blood Glucose (mg/dl):  42, 156, 128, 282, 325, 318, 308    Medical History:  Reviewed medical history with patient and/or caregiver    Social History:  Reviewed social history with patient and/or caregiver    Clinical Assessment     Reviewed and provided basic information on available community resources for mental health, transportation, wellness resources, and palliative care programs with patient and/or caregiver.    Complex Care Plan     Care plan was discussed and completed today with input from patient and/or caregiver.    Goals      Patient/caregiver will accept life style changes to manage and improve CKD prior to discharge from OPCM. - Priority: High      Overall Time to Completion  3 months from 10/15/2019     OPCM Identified Patient Barriers:  Advanced Care Planning:  No  Housing:  no  Lab Adherence:  no  Cognitive/Behavioral Health:  no  Communication:  no  Health Literacy:  no  Equipment/Supplies/Services:  yes  Culural/Mormon Beliefs:  no  Fall Risk:  Neg     OPCM Identified Disease Education Barriers:  Hypertension:  Pos  Chronic Kidney Disease:  Pos       Short Term Goals    Patient/caregiver will verbalize current eGFR value and eGFR goal within 1 month.  Interventions   · Review current eGFR with patient 27.5.   · Review eGFR goal with patient, >60.     10/29/19-Patient/Caregiver agrees to review resource material on CKD by  Next encounter. (12/19-Pending, will reassess with next encounter)  Patient/Caregiver agrees to OPCM follow up within 2 weeks to assess progress to goal.        Status  · Partially met      Patient/caregiver will verbalize  2 signs and symptoms of Kidney Disease that would necessitate a call to healthcare provider or 911 within 2 months.    Signs and Symptoms of Kidney Disease   Call your healthcare provider right away if any of these occur:   Nausea or vomiting   Fever of 100.4°F (38°C) or higher, or as directed by your healthcare provider   Unexpected weight gain or swelling in the legs, ankles, or around the eyes   Decrease or absent urine output   Call 911 if you have any of the following:   Severe weakness, dizziness, fainting, drowsiness, or confusion   Chest pain or shortness of breath   Heart beating fast, slow, or irregularly    Interventions   · Assess for retention of at least 3 signs and symptoms of Kidney Disease that would necessitate a call to healthcare provider or 911.  · Recognize and provide educational material (KRAADRIAN) on Chronic Kidney Disease. - Mailed  · Review with patient/caregiver the signs and symptoms of Kidney Disease that would necessitate a call to healthcare provider or 911.    10/29/19-Patient/Caregiver agrees to recognize 2 s/sx of kidney disease that need a call to her HCP or 911 by End of enrollment.  Patient/Caregiver agrees to OPCM follow up within 2 weeks to assess progress to goal.  (12/19-in progress)      Status  · Partially met    Patient/caregiver will verbalize 3 ways of preventing complications due to Kidney Disease within 2 months.   Ways of Preventing Complications of Kidney Disease  If you have diabetes, talk with your healthcare provider about keeping your blood sugar under control. Ask if you need to make and changes to your diet, lifestyle, or medicines.   If you have high blood pressure:  ? Take prescribed medicine to lower your blood pressure to the recommended goal of less than 130/80.  ? Start a regular exercise program that you enjoy. Check with your healthcare provider to be sure your planned exercise program is right for you.  ? Eat less salt (sodium). Your healthcare provider  can tell you how much salt per day is safe for you.  If you are overweight, talk with your healthcare provider about a weight loss plan.  Most people with CKD need to follow a special diet.  Be sure you understand yours. In general, you will need to limit protein, salt, potassium, and phosphorus. You also need to limit how much fluid you drink.   CKD is a risk factor for heart disease. Talk with your healthcare provider about any other risk factors you might have and what you can do to lessen them.  Talk with your healthcare provider about any medicines you are taking to find out if they need to be reduced or stopped.  Don't use the following over-the-counter medicines, or consult your healthcare provider before using:  ? Aspirin and NSAIDs such as ibuprofen or naproxen. Using acetaminophen for fever or pain is OK.  ? Laxatives and antacids containing magnesium or aluminum  ? Fleet or phospho soda enemas containing phosphorus  ? Certain stomach acid-blocking medicine such as cimetidine or ranitidine   ? Decongestants containing pseudoephedrine   ? Herbal supplements    Interventions   · Assess for retention of at least 3 ways of preventing complications due to Kidney Disease.    · Empower patient/caregiver to discuss treatment plan with Physician/care team. Ongoing  · Educate on importance of compliance with annual vaccinations. Done 10/15  · Educate on importance of compliance with Physician follow-ups. Ongoing  · Educate on importance of compliance with preventive screenings. Ongoing  · Educate on importance of compliance with scheduled laboratory testing and procedure ongoing.  · Educate on importance of Dietary Compliance. Ongoing  · Educate on importance of Medication Compliance. Ongoing  · Recognize and provide educational material (KRAADRIAN) on Chronic Kidney Disease. - Mailed  · Review with patient/caregiver ways of prevention complications due to Kidney Disease.       Status  · Partially met                Clinical Reference Documents Added to Patient Instructions       Document    CHRONIC KIDNEY DISEASE (CKD) (ENGLISH)    CHRONIC KIDNEY DISEASE, DIET FOR (ENGLISH)    HEART-HEALTHY FOOD, EATING: USING THE DASH PLAN (ENGLISH)    HIGH BLOOD PRESSURE, CONTROLLING (ENGLISH)    KIDNEY DISEASE, ANEMIA AND (ENGLISH)    KIDNEY DISEASE, HIGH BLOOD PRESSURE AND (ENGLISH)    STROKE AND HIGH BLOOD PRESSURE, UNDERSTANDING THE LINK BETWEEN (ENGLISH)             Patient/caregiver will check and record blood pressure daily. If >140/90 for 3 days, patient/caregiver will know to notify Physician, prior to discharge from OPCM - Priority: High      Overall Time to Completion  2 months from 10/15/2019     OPCM Identified Patient Barriers:  NA    OPCM Identified Education Barriers:  Education Barrier for Hypertension--Care Plan Created  Education Barrier for CKD--Care Plan Created      Short Term Goals  Patient/caregiver will measure and record the blood pressure 1 times per day for 2 weeks.  Interventions   · Assess for availability of working blood pressure cuff in home setting. Done 10/15  · Assess for retention of the signs and symptoms of disease specific exacerbation. Done 10/29  · Collaborate with Physician as appropriate to meet patient needs. Ongoing  · Mail Blood pressure logs for home use. Done 10/15    10/15/19-Patient/Caregiver agrees to check and log BP daily by next encounter. (10/29-patient reported that received the BP logs and has checking using them to monitor her BP).   Patient/Caregiver agrees to OPCM follow up within 2 weeks to assess progress to goal.-DONE 10/29     Status  · MET      Patient/caregiver will verbalize 2 red flags of Hypertension/Hypotension and know when to contact Physician within 2 weeks.  Interventions   · Assess for retention of the signs and symptoms of disease specific exacerbation. Done 10/29  · Collaborate with Physician as appropriate to meet patient needs.  Ongoing      10/29/19-Patient/Caregiver agrees to recognize 2 red flags of Hypo/hypertension by end of enrollment.  Patient/Caregiver agrees to OPCM follow up within 2 weeks to assess progress to goal.  (11/22-patient has vaibhav experiencing headaches and high BP readings, has been in frequent contact with her PCP for management of hypertension)     Status  · MET      Patient/caregiver will verbalize 2 signs and symptoms of Hypertension/Hypotension within 3 weeks.   Interventions   Recognize and provide educational material (KARINE).Mailed 10/14    11/22-Patient/Caregiver agrees to recognize 2 s/sx of hypertension by  End of OPCM enrollment. (IN PROGRESS)  Patient/Caregiver agrees to OPCM follow up within 2 weeks to assess progress to goal.      Status  · Partially met           Clinical Reference Documents Added to Patient Instructions       Document    CHRONIC KIDNEY DISEASE (CKD) (ENGLISH)    CHRONIC KIDNEY DISEASE, DIET FOR (ENGLISH)    HEART-HEALTHY FOOD, EATING: USING THE DASH PLAN (ENGLISH)    HIGH BLOOD PRESSURE, CONTROLLING (ENGLISH)    KIDNEY DISEASE, ANEMIA AND (ENGLISH)    KIDNEY DISEASE, HIGH BLOOD PRESSURE AND (ENGLISH)    STROKE AND HIGH BLOOD PRESSURE, UNDERSTANDING THE LINK BETWEEN (ENGLISH)             Patient/caregiver will have knowledge of resources available in order to obtain the services that are needed prior to discharge from OPCM. - Priority: High      Overall Time to Completion  2 months from 10/15/2019     OPCM Identified Patient Barriers:      OPCM Identified Education Barriers:  Education Barrier for Hypertension--Care Plan Created  Education Barrier for CKD--Care Plan Created      Short Term Goals   Patient/caregiver will fill out any applications that were sent to patient to complete from OPCM  within 1 month.  Interventions   · Refer to Outpatient Case Management Social Worker. Done 10/14, 10/29-pending outreach     Status  · Partially met      Patient/caregiver will have  contact information for identified community resources IE:OPCM  for follow-up within 1 month.  Interventions   · Refer to Outpatient Case Management Social Worker. Done 10/14, 10/29-pending outreach     Status  · Partially met      Patient/caregiver will notify RN CCM if patient does not hear from OPCM  within 2 weeks.  Interventions   · Refer to Outpatient Case Management Social Worker. Done 10/14, 10/29-pending outreach     Status  · Partially met                Patient Instructions     Instructions were provided via the [employer] resources-mailed to patient upon enrollment    Follow up in about 2 weeks (around 1/2/2020) for RN Follow UP.      Todays OPCM Self-Management Care Plan was developed with the patients/caregivers input and was based on identified barriers from todays assessment.  Goals were written today with the patient/caregiver and the patient has agreed to work towards these goals to improve his/her overall well-being. Patient verbalized understanding of the care plan, goals, and all of today's instructions. Encouraged patient/caregiver to communicate with his/her physician and health care team about health conditions and the treatment plan.  Provided my contact information today and encouraged patient/caregiver to call me with any questions as needed.

## 2019-12-23 NOTE — TELEPHONE ENCOUNTER
DOCUMENTATION ONLY:  Prior authorization for Aimovig approved from 12/23/19 to 12/31/20    Case ID# [id number]    Co-pay: $0.00    Patient Assistance IS NOT required.    Forward to clinical pharmacist for consult & shipment. -HBR

## 2019-12-27 RX ORDER — GEMFIBROZIL 600 MG/1
600 TABLET, FILM COATED ORAL
Qty: 48 TABLET | Refills: 0 | Status: SHIPPED | OUTPATIENT
Start: 2019-12-27 | End: 2020-05-30

## 2019-12-27 NOTE — TELEPHONE ENCOUNTER
Pt eGFR has decreased slightly    Will adjust gemfibrozil from one tablet every other day  To one tablet 3 times weekly    Please advise pt of change

## 2019-12-27 NOTE — TELEPHONE ENCOUNTER
Spoke to pt. Pt advised of results and rx change.  Pt verbalized understanding.    Pt stated that she thinks she has a UTI.  I advised that she a urine specimen would need to be collected and sent for a culture, which can take 72 hours.  I advised that she should try to also contact her Urologist, Tracee Ken NP or Dr. Whittington.  Pt stated that they were out of the office.  I advised that they should have someone answering messages for them.  She stated that she will give them another call.  I advised to call back if she prefers to bring the urine specimen.    ALYCIA.

## 2019-12-29 ENCOUNTER — PATIENT MESSAGE (OUTPATIENT)
Dept: SURGERY | Facility: HOSPITAL | Age: 69
End: 2019-12-29

## 2020-01-02 ENCOUNTER — PATIENT MESSAGE (OUTPATIENT)
Dept: PHYSICAL MEDICINE AND REHAB | Facility: CLINIC | Age: 70
End: 2020-01-02

## 2020-01-02 DIAGNOSIS — G89.29 CHRONIC MIDLINE LOW BACK PAIN WITHOUT SCIATICA: ICD-10-CM

## 2020-01-02 DIAGNOSIS — G89.29 CHRONIC NECK PAIN: ICD-10-CM

## 2020-01-02 DIAGNOSIS — M79.7 FIBROMYALGIA: ICD-10-CM

## 2020-01-02 DIAGNOSIS — M54.2 CHRONIC NECK PAIN: ICD-10-CM

## 2020-01-02 DIAGNOSIS — M54.50 CHRONIC MIDLINE LOW BACK PAIN WITHOUT SCIATICA: ICD-10-CM

## 2020-01-03 ENCOUNTER — OUTPATIENT CASE MANAGEMENT (OUTPATIENT)
Dept: ADMINISTRATIVE | Facility: OTHER | Age: 70
End: 2020-01-03

## 2020-01-03 RX ORDER — HYDROCODONE BITARTRATE AND ACETAMINOPHEN 10; 325 MG/1; MG/1
1 TABLET ORAL EVERY 6 HOURS PRN
Qty: 120 TABLET | Refills: 0 | Status: SHIPPED | OUTPATIENT
Start: 2020-01-03 | End: 2020-01-31 | Stop reason: SDUPTHER

## 2020-01-03 NOTE — PROGRESS NOTES
Outpatient Care Management  Plan of Care Follow Up Visit    Patient: Cecile Bowen  MRN: [id number]  Date of Service: 01/03/2020  Completed by: Ezequiel Cornejo RN  Referral Date: 09/25/2019  Program: Disease Management (Diabetes & COPD)    Reason for Visit   Patient presents with    Update Plan Of Care     1/3/2020       Brief Summary: Contacted patient for F/U.  Patient reported that she was not feeling well today as she was fighting a migraine headache. Reported that her BP readings were down and somewhat better. Stated that most of her Systolic readings are in the 130s, but her diastolic is in the 90s. Continues to take her medication as directed. Feels that her BP is not helping with her migraines. Patient has been in contact with Dr. Henao's team and is scheduled for an appt on Jan 13th. Attempted to review her BG readings but patient was not feeling well, reported that she feels that the [place] is gradually improving her BG readings.  Reminded patient of upcoming lab appt on Jan 8th to have HgbA1C checked. Reminded patient of other upcoming appointments, verbalized awareness. Patient appreciative of outreach. Discussed completing F/U call with patient, who is in agreement with call in 4 weeks. Will plan to discuss case closure at that time.     Next Steps:   F/U in 2 weeks as discussed  Continue education on Hypertension and CKD.   Collaborate with [facility] to assist with needs        Patient Summary     Involvement of Care:  Do I have permission to speak with other family members about your care?       Patient Reported Labs & Vitals:  1.  Any Patient Reported Labs & Vitals?     2.  Patient Reported Blood Pressure:   130s/90s  3.  Patient Reported Pulse:     4.  Patient Reported Weight (Kg):     5.  Patient Reported Blood Glucose (mg/dl):       Medical History:  Reviewed medical history with patient and/or caregiver    Social History:  Reviewed social history with patient and/or caregiver    Clinical  Assessment     Reviewed and provided basic information on available community resources for mental health, transportation, wellness resources, and palliative care programs with patient and/or caregiver.    Complex Care Plan     Care plan was discussed and completed today with input from patient and/or caregiver.    Goals      Patient/caregiver will accept life style changes to manage and improve CKD prior to discharge from OPCM. - Priority: High      Overall Time to Completion  3 months from 10/15/2019     OPCM Identified Patient Barriers:  Advanced Care Planning:  No  Housing:  no  Lab Adherence:  no  Cognitive/Behavioral Health:  no  Communication:  no  Health Literacy:  no  Equipment/Supplies/Services:  yes  Culural/Sikh Beliefs:  no  Fall Risk:  Neg     OPCM Identified Disease Education Barriers:  Hypertension:  Pos  Chronic Kidney Disease:  Pos       Short Term Goals    Patient/caregiver will verbalize current eGFR value and eGFR goal within 1 month.  Interventions   · Review current eGFR with patient 27.5.   · Review eGFR goal with patient, >60.     10/29/19-Patient/Caregiver agrees to review resource material on CKD by  Next encounter. (12/19-Pending, will reassess with next encounter)  Patient/Caregiver agrees to OPCM follow up within 2 weeks to assess progress to goal.        Status  · Partially met      Patient/caregiver will verbalize 2 signs and symptoms of Kidney Disease that would necessitate a call to healthcare provider or 911 within 2 months.    Signs and Symptoms of Kidney Disease   Call your healthcare provider right away if any of these occur:   Nausea or vomiting   Fever of 100.4°F (38°C) or higher, or as directed by your healthcare provider   Unexpected weight gain or swelling in the legs, ankles, or around the eyes   Decrease or absent urine output   Call 911 if you have any of the following:   Severe weakness, dizziness, fainting, drowsiness, or confusion   Chest pain or shortness of  breath   Heart beating fast, slow, or irregularly    Interventions   · Assess for retention of at least 3 signs and symptoms of Kidney Disease that would necessitate a call to healthcare provider or 911.  · Recognize and provide educational material (KARINE) on Chronic Kidney Disease. - Mailed  · Review with patient/caregiver the signs and symptoms of Kidney Disease that would necessitate a call to healthcare provider or 911.    10/29/19-Patient/Caregiver agrees to recognize 2 s/sx of kidney disease that need a call to her HCP or 911 by End of enrollment.  Patient/Caregiver agrees to OPCM follow up within 2 weeks to assess progress to goal.  (12/19-in progress)      Status  · Partially met    Patient/caregiver will verbalize 3 ways of preventing complications due to Kidney Disease within 2 months.   Ways of Preventing Complications of Kidney Disease  If you have diabetes, talk with your healthcare provider about keeping your blood sugar under control. Ask if you need to make and changes to your diet, lifestyle, or medicines.   If you have high blood pressure:  ? Take prescribed medicine to lower your blood pressure to the recommended goal of less than 130/80.  ? Start a regular exercise program that you enjoy. Check with your healthcare provider to be sure your planned exercise program is right for you.  ? Eat less salt (sodium). Your healthcare provider can tell you how much salt per day is safe for you.  If you are overweight, talk with your healthcare provider about a weight loss plan.  Most people with CKD need to follow a special diet.  Be sure you understand yours. In general, you will need to limit protein, salt, potassium, and phosphorus. You also need to limit how much fluid you drink.   CKD is a risk factor for heart disease. Talk with your healthcare provider about any other risk factors you might have and what you can do to lessen them.  Talk with your healthcare provider about any medicines you are  taking to find out if they need to be reduced or stopped.  Don't use the following over-the-counter medicines, or consult your healthcare provider before using:  ? Aspirin and NSAIDs such as ibuprofen or naproxen. Using acetaminophen for fever or pain is OK.  ? Laxatives and antacids containing magnesium or aluminum  ? Fleet or phospho soda enemas containing phosphorus  ? Certain stomach acid-blocking medicine such as cimetidine or ranitidine   ? Decongestants containing pseudoephedrine   ? Herbal supplements    Interventions   · Assess for retention of at least 3 ways of preventing complications due to Kidney Disease.    · Empower patient/caregiver to discuss treatment plan with Physician/care team. Ongoing  · Educate on importance of compliance with annual vaccinations. Done 10/15  · Educate on importance of compliance with Physician follow-ups. Ongoing  · Educate on importance of compliance with preventive screenings. Ongoing  · Educate on importance of compliance with scheduled laboratory testing and procedure ongoing.  · Educate on importance of Dietary Compliance. Ongoing  · Educate on importance of Medication Compliance. Ongoing  · Recognize and provide educational material (KARINE) on Chronic Kidney Disease. - Mailed  · Review with patient/caregiver ways of prevention complications due to Kidney Disease.       Status  · Partially met               Clinical Reference Documents Added to Patient Instructions       Document    CHRONIC KIDNEY DISEASE (CKD) (ENGLISH)    CHRONIC KIDNEY DISEASE, DIET FOR (ENGLISH)    HEART-HEALTHY FOOD, EATING: USING THE DASH PLAN (ENGLISH)    HIGH BLOOD PRESSURE, CONTROLLING (ENGLISH)    KIDNEY DISEASE, ANEMIA AND (ENGLISH)    KIDNEY DISEASE, HIGH BLOOD PRESSURE AND (ENGLISH)    STROKE AND HIGH BLOOD PRESSURE, UNDERSTANDING THE LINK BETWEEN (ENGLISH)             Patient/caregiver will have knowledge of resources available in order to obtain the services that are needed prior to  discharge from OPCM. - Priority: High      Overall Time to Completion  2 months from 10/15/2019     OPCM Identified Patient Barriers:      OPCM Identified Education Barriers:  Education Barrier for Hypertension--Care Plan Created  Education Barrier for CKD--Care Plan Created      Short Term Goals   Patient/caregiver will fill out any applications that were sent to patient to complete from OPCM  within 1 month.  Interventions   · Refer to Outpatient Case Management Social Worker. Done 10/14, 10/29-pending outreach     Status  · Partially met      Patient/caregiver will have contact information for identified community resources IE:OPCM  for follow-up within 1 month.  Interventions   · Refer to Outpatient Case Management Social Worker. Done 10/14, 10/29-pending outreach     Status  · Partially met      Patient/caregiver will notify RN CCM if patient does not hear from OPCM  within 2 weeks.  Interventions   · Refer to Outpatient Case Management Social Worker. Done 10/14, 10/29-pending outreach     Status  · Partially met                Patient Instructions     Instructions were provided via the [employer] patient [email or web address] and are available for the patient to view on the patient portal.    Follow up in about 27 days (around 1/30/2020) for RN Follow UP.      Today[facility] Self-Management Care Plan was developed with the patients/caregivers input and was based on identified barriers from todays assessment.  Goals were written today with the patient/caregiver and the patient has agreed to work towards these goals to improve his/her overall well-being. Patient verbalized understanding of the care plan, goals, and all of today's instructions. Encouraged patient/caregiver to communicate with his/her physician and health care team about health conditions and the treatment plan.  Provided my contact information today and encouraged patient/caregiver to call me with any questions as  needed.

## 2020-01-08 NOTE — PROGRESS NOTES
[facility] MARYJO left a voicemail message on [phone]; voicemail did not  on [phone]. [facility] MARYJO will close case at this time.

## 2020-01-09 ENCOUNTER — OFFICE VISIT (OUTPATIENT)
Dept: UROLOGY | Facility: CLINIC | Age: 70
End: 2020-01-09
Payer: MEDICARE

## 2020-01-09 ENCOUNTER — LAB VISIT (OUTPATIENT)
Dept: LAB | Facility: HOSPITAL | Age: 70
End: 2020-01-09
Payer: MEDICARE

## 2020-01-09 VITALS
WEIGHT: 291 LBS | BODY MASS INDEX: 44.1 KG/M2 | SYSTOLIC BLOOD PRESSURE: 135 MMHG | HEIGHT: 68 IN | HEART RATE: 118 BPM | DIASTOLIC BLOOD PRESSURE: 105 MMHG

## 2020-01-09 DIAGNOSIS — N31.9 NEUROGENIC BLADDER: ICD-10-CM

## 2020-01-09 DIAGNOSIS — Z43.5 ENCOUNTER FOR CARE OR REPLACEMENT OF SUPRAPUBIC TUBE: Primary | ICD-10-CM

## 2020-01-09 DIAGNOSIS — N32.89 BLADDER SPASMS: ICD-10-CM

## 2020-01-09 DIAGNOSIS — R33.9 URINARY RETENTION: ICD-10-CM

## 2020-01-09 DIAGNOSIS — Z93.59 CHRONIC SUPRAPUBIC CATHETER: ICD-10-CM

## 2020-01-09 DIAGNOSIS — L92.9 GRANULATION TISSUE: ICD-10-CM

## 2020-01-09 LAB
ESTIMATED AVG GLUCOSE: 209 MG/DL (ref 68–131)
HBA1C MFR BLD HPLC: 8.9 % (ref 4–5.6)

## 2020-01-09 PROCEDURE — 1159F MED LIST DOCD IN RCRD: CPT | Mod: ,,, | Performed by: NURSE PRACTITIONER

## 2020-01-09 PROCEDURE — 51705 CHANGE OF BLADDER TUBE: CPT | Mod: PBBFAC | Performed by: NURSE PRACTITIONER

## 2020-01-09 PROCEDURE — 87077 CULTURE AEROBIC IDENTIFY: CPT

## 2020-01-09 PROCEDURE — 87088 URINE BACTERIA CULTURE: CPT

## 2020-01-09 PROCEDURE — 1159F PR MEDICATION LIST DOCUMENTED IN MEDICAL RECORD: ICD-10-PCS | Mod: ,,, | Performed by: NURSE PRACTITIONER

## 2020-01-09 PROCEDURE — 51705 PR CHANGE OF BLADDER TUBE,SIMPLE: ICD-10-PCS | Mod: S$PBB,,, | Performed by: NURSE PRACTITIONER

## 2020-01-09 PROCEDURE — 99214 PR OFFICE/OUTPT VISIT, EST, LEVL IV, 30-39 MIN: ICD-10-PCS | Mod: S$PBB,25,, | Performed by: NURSE PRACTITIONER

## 2020-01-09 PROCEDURE — 99215 OFFICE O/P EST HI 40 MIN: CPT | Mod: PBBFAC,25 | Performed by: NURSE PRACTITIONER

## 2020-01-09 PROCEDURE — 1126F AMNT PAIN NOTED NONE PRSNT: CPT | Mod: ,,, | Performed by: NURSE PRACTITIONER

## 2020-01-09 PROCEDURE — 17250 CHEM CAUT OF GRANLTJ TISSUE: CPT | Mod: S$PBB,51,, | Performed by: NURSE PRACTITIONER

## 2020-01-09 PROCEDURE — 99214 OFFICE O/P EST MOD 30 MIN: CPT | Mod: S$PBB,25,, | Performed by: NURSE PRACTITIONER

## 2020-01-09 PROCEDURE — 1126F PR PAIN SEVERITY QUANTIFIED, NO PAIN PRESENT: ICD-10-PCS | Mod: ,,, | Performed by: NURSE PRACTITIONER

## 2020-01-09 PROCEDURE — 99999 PR PBB SHADOW E&M-EST. PATIENT-LVL V: CPT | Mod: PBBFAC,,, | Performed by: NURSE PRACTITIONER

## 2020-01-09 PROCEDURE — 87186 SC STD MICRODIL/AGAR DIL: CPT

## 2020-01-09 PROCEDURE — 83036 HEMOGLOBIN GLYCOSYLATED A1C: CPT

## 2020-01-09 PROCEDURE — 99999 PR PBB SHADOW E&M-EST. PATIENT-LVL V: ICD-10-PCS | Mod: PBBFAC,,, | Performed by: NURSE PRACTITIONER

## 2020-01-09 PROCEDURE — 36415 COLL VENOUS BLD VENIPUNCTURE: CPT

## 2020-01-09 PROCEDURE — 17250 PR CHEM CAUTERY GRANULATN TISSUE: ICD-10-PCS | Mod: S$PBB,51,, | Performed by: NURSE PRACTITIONER

## 2020-01-09 PROCEDURE — 17250 CHEM CAUT OF GRANLTJ TISSUE: CPT | Mod: PBBFAC | Performed by: NURSE PRACTITIONER

## 2020-01-09 PROCEDURE — 87086 URINE CULTURE/COLONY COUNT: CPT

## 2020-01-09 PROCEDURE — 51705 CHANGE OF BLADDER TUBE: CPT | Mod: S$PBB,,, | Performed by: NURSE PRACTITIONER

## 2020-01-09 NOTE — PROGRESS NOTES
Subjective:       Patient ID: Cecile Bowen is a 69 y.o. female.    Chief Complaint: Urinary Retention (Neurogenic Bladder) and Follow-up (SPT change)    Cecile Bowen is a 69 y.o. Diabetic Female with history of bilateral hydronephrosis, incomplete bladder emptying which is managed by SPT (16fr).  S/p Cystoscopy with bladder botox injection (300u) 09/17/2018 with Dr. Whittington.    She had recently discovered breast cancer; s/p Mastectomy 08/01/2019  Malignant neoplasm of upper-outer quadrant of left breast in female, estrogen receptor positive   No need for chemo/radiation therapy.    Her last SPT change was 11/26/2019.    Here today for SPT change and collect urine for upcoming Botox procedure with Dr. Whittington.  He has missed her past appointments due to severe migraines    Bladder spasms present; the botox wearing off.  No fever, n/v    S/p Mastectomy 08/01/2019                    Past Medical History:    Diabetes type 2, uncontrolled                   12/12/2012    Obesity                                         12/12/2012    Hypertension                                    12/12/2012    DJD (degenerative joint disease)                12/12/2012    Hypothyroidism                                  12/12/2012    Nuclear sclerosis - Both Eyes                   3/24/2014     Migraine                                                      Past Surgical History:    TOTAL ABDOMINAL HYSTERECTOMY W/ BILATERAL SALP*  1985          GALLBLADDER SURGERY                              2006          TONSILLECTOMY, ADENOIDECTOMY                                   OVARIAN CYST SURGERY                             1985          HYSTERECTOMY                                                   DILATION AND CURETTAGE OF UTERUS                               Review of patient's family history indicates:    Diabetes                       Sister                    Kidney disease                 Sister                    ALS                 "            Mother                      Comment: d.    Cancer                         Maternal Grandmother        Comment: [email or web address] colon    Cancer                         Paternal Grandfather        Comment: d. lung    Diabetes                       Maternal Aunt             Kidney disease                 Maternal Aunt             Diabetes                       Maternal Uncle            Amblyopia                      Neg Hx                    Blindness                      Neg Hx                    Cataracts                      Neg Hx                    Glaucoma                       Neg Hx                    Macular degeneration           Neg Hx                    Retinal detachment             Neg Hx                    Strabismus                     Neg Hx                      Social History    Marital Status:             Spouse Name:                       Years of Education:                 Number of children:               Occupational History    None on file    Social History Main Topics    Smoking Status: Never Smoker                      Smokeless Status: Never Used                        Alcohol Use: No              Drug Use: No              Sexual Activity: Not Currently           Birth Control/Protection: Abstinence    Other Topics            Concern    None on file    Social History Narrative    Single      Lives w/ sister  And brother     both are helping her during her post hosp recovery period        Allergies:  Review of patient's allergies indicates no known allergies.    Medications:  Current outpatient prescriptions:amitriptyline (ELAVIL) 10 MG tablet, TAKE 3 TABLETS BY MOUTH IN THE EVENING, Disp: 90 tablet, Rfl: 5;  aspirin (ECOTRIN) 81 MG EC tablet, Take 81 mg by mouth once daily., Disp: , Rfl: ;  BD INSULIN PEN NEEDLE UF SHORT 31 X 5/16 " Ndle, USE ONCE DAILY WITH LANTUS SOLOSTAR PEN INSULIN, Disp: 100 each, Rfl: 11  butalbital-acetaminophen-caffeine -40 mg (FIORICET, ESGIC) -40 " "mg per tablet, TAKE 1 TABLET EVERY 4 TO 6 HOURS, Disp: 30 tablet, Rfl: 0;  cyanocobalamin, vitamin B-12, (VITAMIN B-12) 2,500 mcg Subl, Place 2,500 mcg under the tongue once daily., Disp: , Rfl: ;  diphenhydrAMINE (BENADRYL) 25 mg capsule, Take 25 mg by mouth every 6 (six) hours as needed., Disp: , Rfl:   ferrous sulfate 325 (65 FE) MG EC tablet, Take 325 mg by mouth 3 (three) times daily with meals., Disp: , Rfl: ;  fluticasone (FLONASE) 50 mcg/actuation nasal spray, 1 SPRAY IN EACH NOSTRIL DAILY (Patient taking differently: 1 SPRAY IN EACH NOSTRIL DAILY PRN), Disp: 16 g, Rfl: 1;  furosemide (LASIX) 20 MG tablet, Take 1 tablet (20 mg total) by mouth once daily., Disp: 4 tablet, Rfl: 0  gemfibrozil (LOPID) 600 MG tablet, TAKE 1 TABLET BY MOUTH TWICE A DAY, Disp: 60 tablet, Rfl: 5;  (START ON 4/29/2015) hydrocodone-acetaminophen 10-325mg (NORCO)  mg Tab, Take 1 tablet by mouth every 6 (six) hours as needed., Disp: 120 tablet, Rfl: 0;  insulin lispro (HUMALOG) 100 unit/mL injection, Inject 7 Units into the skin 3 (three) times daily before meals., Disp: 6.3 mL, Rfl: 11  insulin syringe-needle U-100 (BD INSULIN SYRINGE ULTRA-FINE) 1/2 mL 31 x 15/64" Syrg, 1 Box by Misc.(Non-Drug; Combo Route) route 4 (four) times daily., Disp: 100 Syringe, Rfl: 12;  lancets (ONE TOUCH DELICA LANCETS) Misc, Ultra 2 lancets to check blood sugar BID, Disp: 100 each, Rfl: 6;  LANTUS SOLOSTAR 100 unit/mL (3 mL) InPn pen, , Disp: , Rfl: 3  LIDODERM 5 %(700 mg/patch), APPLY 1 PATCH TO SKIN DAILY (LEAVING ON FOR 12 HOURS AND OFF FOR 12 HOURS), Disp: 30 patch, Rfl: 6;  magnesium oxide (MAG-OX) 400 mg tablet, TAKE 1 TABLET (400 MG TOTAL) BY MOUTH 2 (TWO) TIMES DAILY., Disp: 60 tablet, Rfl: 11;  methocarbamol (ROBAXIN) 750 MG Tab, TAKE 1 TO 2 TABLETS BY MOUTH 3 TIMES A DAY (Patient taking differently: Take 2 tablets twice daily), Disp: 120 tablet, Rfl: 3  methocarbamol (ROBAXIN) 750 MG Tab, TAKE 1 TO 2 TABLETS BY MOUTH 3 TIMES A DAY, Disp: " 120 tablet, Rfl: 3;  methylPREDNISolone (MEDROL DOSEPACK) 4 mg tablet, use as directed, Disp: 21 tablet, Rfl: 0;  multivitamin with minerals tablet, Take 1 tablet by mouth once daily., Disp: , Rfl: ;  pravastatin (PRAVACHOL) 40 MG tablet, TAKE 1 TABLET BY MOUTH EVERY DAY, Disp: 30 tablet, Rfl: 2  pyridoxine (B-6) 100 MG Tab, Take 1 tablet (100 mg total) by mouth once daily., Disp: 30 tablet, Rfl: 12;  scopolamine (TRANSDERM-SCOP) 1.5 mg, Place 1 patch (1.5 mg total) onto the skin every 72 hours., Disp: 4 patch, Rfl: 0;  sulfamethoxazole-trimethoprim 800-160mg (BACTRIM DS) 800-160 mg Tab, Take 1 tablet by mouth 2 (two) times daily., Disp: , Rfl: 0  sumatriptan (IMITREX) 100 MG tablet, TAKE 1 TABLET (100 MG TOTAL) BY MOUTH ONCE., Disp: 9 tablet, Rfl: 6;  SYNTHROID 125 mcg tablet, TAKE 1 TABLET BY MOUTH DAILY, Disp: 90 tablet, Rfl: 4;  topiramate (TOPAMAX) 100 MG tablet, TAKE 1 TABLET (100 MG TOTAL) BY MOUTH 2 (TWO) TIMES DAILY., Disp: 60 tablet, Rfl: 11;  topiramate (TOPAMAX) 25 MG tablet, Take 4 tablets (100 mg total) by mouth 2 (two) times daily., Disp: 240 tablet, Rfl: 5  venlafaxine (EFFEXOR-XR) 150 MG Cp24, TAKE 1 CAPSULE BY MOUTH ONCE DAILY, Disp: 30 capsule, Rfl: 2;  vitamin D (VITAMIN D3) 185 MG Tab, Take 185 mg by mouth once daily., Disp: , Rfl:         Review of Systems   Constitutional: Negative.  Negative for chills and fever.   HENT: Negative for facial swelling and trouble swallowing.    Eyes: Negative for visual disturbance.   Respiratory: Negative for cough, chest tightness, shortness of breath and wheezing.    Cardiovascular: Negative for chest pain and palpitations.   Gastrointestinal: Negative for abdominal pain, constipation, nausea and vomiting.   Genitourinary: Positive for difficulty urinating. Negative for dysuria, hematuria, urgency and vaginal bleeding.        SPT draining;   (+) bladder spasms     Musculoskeletal: Positive for arthralgias and gait problem.   Skin: Positive for rash.    Neurological: Negative for dizziness and headaches.   Hematological: Does not bruise/bleed easily.   Psychiatric/Behavioral: Negative for behavioral problems.       Objective:      Physical Exam   Nursing note and vitals reviewed.  Constitutional: She is oriented to person, place, and time. Vital signs are normal. She appears well-developed and well-nourished. She is active.   HENT:   Head: Normocephalic.   Right Ear: External ear normal.   Left Ear: External ear normal.   Nose: Nose normal.   Eyes: Conjunctivae and lids are normal. Right eye exhibits no discharge. Left eye exhibits no discharge. No scleral icterus.   Neck: Trachea normal and normal range of motion. Neck supple. No tracheal deviation present.   Cardiovascular: Normal rate and intact distal pulses.    Pulmonary/Chest: Effort normal. No respiratory distress.   Abdominal: Soft. She exhibits no distension. There is no tenderness. There is no guarding.       Musculoskeletal: Normal range of motion. She exhibits no edema.   Neurological: She is alert and oriented to person, place, and time.   Skin: Skin is warm, dry and intact.     Psychiatric: She has a normal mood and affect. Her behavior is normal.       Assessment:       1. Encounter for care or replacement of suprapubic tube    2. Neurogenic bladder    3. Bladder spasms    4. Chronic suprapubic catheter    5. Urinary retention    6. Granulation tissue        Plan:         I spent 30 minutes with the patient of which more than half was spent in direct consultation with the patient in regards to our treatment and plan.    Education and recommendations of today's plan of care including home remedies.  Silver nitrates sticks x 3 to granulating tissue  Old SPT easily removed.   Stoma prepped with betadine.   New 16fr SPT placed using sterile technique  Urine received and sent to lab for culture.  Irrigated the bladder to verify the position and plug to distal end  Balloon inflated 9cc sterile  water.  Tolerated well  Skin barrier cream to apply to surrounding tissue and along abdomen  dsg applied  Skin barrier cream given  F/u after botox

## 2020-01-10 RX ORDER — BUTALBITAL, ACETAMINOPHEN AND CAFFEINE 50; 325; 40 MG/1; MG/1; MG/1
TABLET ORAL
Qty: 10 TABLET | Refills: 0 | Status: SHIPPED | OUTPATIENT
Start: 2020-01-10 | End: 2020-02-10

## 2020-01-11 LAB — BACTERIA UR CULT: ABNORMAL

## 2020-01-13 ENCOUNTER — PATIENT MESSAGE (OUTPATIENT)
Dept: UROLOGY | Facility: CLINIC | Age: 70
End: 2020-01-13

## 2020-01-13 ENCOUNTER — OFFICE VISIT (OUTPATIENT)
Dept: NEUROLOGY | Facility: CLINIC | Age: 70
End: 2020-01-13
Payer: MEDICARE

## 2020-01-13 ENCOUNTER — LAB VISIT (OUTPATIENT)
Dept: LAB | Facility: HOSPITAL | Age: 70
End: 2020-01-13
Attending: PSYCHIATRY & NEUROLOGY
Payer: MEDICARE

## 2020-01-13 VITALS
HEART RATE: 114 BPM | HEIGHT: 68 IN | WEIGHT: 291 LBS | DIASTOLIC BLOOD PRESSURE: 83 MMHG | BODY MASS INDEX: 44.1 KG/M2 | SYSTOLIC BLOOD PRESSURE: 132 MMHG

## 2020-01-13 DIAGNOSIS — Z93.59 CHRONIC SUPRAPUBIC CATHETER: ICD-10-CM

## 2020-01-13 DIAGNOSIS — N32.89 BLADDER SPASMS: ICD-10-CM

## 2020-01-13 DIAGNOSIS — N39.0 BACTERIAL UTI: Primary | ICD-10-CM

## 2020-01-13 DIAGNOSIS — G43.719 INTRACTABLE CHRONIC MIGRAINE WITHOUT AURA AND WITHOUT STATUS MIGRAINOSUS: Primary | ICD-10-CM

## 2020-01-13 DIAGNOSIS — E53.8 B12 DEFICIENCY: ICD-10-CM

## 2020-01-13 DIAGNOSIS — A49.9 BACTERIAL UTI: Primary | ICD-10-CM

## 2020-01-13 DIAGNOSIS — Z01.818 PREOP EXAMINATION: ICD-10-CM

## 2020-01-13 DIAGNOSIS — E55.9 VITAMIN D DEFICIENCY DISEASE: ICD-10-CM

## 2020-01-13 DIAGNOSIS — G43.719 INTRACTABLE CHRONIC MIGRAINE WITHOUT AURA AND WITHOUT STATUS MIGRAINOSUS: ICD-10-CM

## 2020-01-13 LAB
25(OH)D3+25(OH)D2 SERPL-MCNC: 49 NG/ML (ref 30–96)
VIT B12 SERPL-MCNC: 1199 PG/ML (ref 210–950)

## 2020-01-13 PROCEDURE — 99999 PR PBB SHADOW E&M-EST. PATIENT-LVL III: CPT | Mod: PBBFAC,,, | Performed by: PSYCHIATRY & NEUROLOGY

## 2020-01-13 PROCEDURE — 99214 OFFICE O/P EST MOD 30 MIN: CPT | Mod: S$PBB,,, | Performed by: PSYCHIATRY & NEUROLOGY

## 2020-01-13 PROCEDURE — 82607 VITAMIN B-12: CPT

## 2020-01-13 PROCEDURE — 99214 PR OFFICE/OUTPT VISIT, EST, LEVL IV, 30-39 MIN: ICD-10-PCS | Mod: S$PBB,,, | Performed by: PSYCHIATRY & NEUROLOGY

## 2020-01-13 PROCEDURE — 1159F PR MEDICATION LIST DOCUMENTED IN MEDICAL RECORD: ICD-10-PCS | Mod: ,,, | Performed by: PSYCHIATRY & NEUROLOGY

## 2020-01-13 PROCEDURE — 1125F AMNT PAIN NOTED PAIN PRSNT: CPT | Mod: ,,, | Performed by: PSYCHIATRY & NEUROLOGY

## 2020-01-13 PROCEDURE — 84425 ASSAY OF VITAMIN B-1: CPT

## 2020-01-13 PROCEDURE — 82306 VITAMIN D 25 HYDROXY: CPT

## 2020-01-13 PROCEDURE — 84207 ASSAY OF VITAMIN B-6: CPT

## 2020-01-13 PROCEDURE — 1159F MED LIST DOCD IN RCRD: CPT | Mod: ,,, | Performed by: PSYCHIATRY & NEUROLOGY

## 2020-01-13 PROCEDURE — 99999 PR PBB SHADOW E&M-EST. PATIENT-LVL III: ICD-10-PCS | Mod: PBBFAC,,, | Performed by: PSYCHIATRY & NEUROLOGY

## 2020-01-13 PROCEDURE — 1125F PR PAIN SEVERITY QUANTIFIED, PAIN PRESENT: ICD-10-PCS | Mod: ,,, | Performed by: PSYCHIATRY & NEUROLOGY

## 2020-01-13 PROCEDURE — 36415 COLL VENOUS BLD VENIPUNCTURE: CPT

## 2020-01-13 PROCEDURE — 99213 OFFICE O/P EST LOW 20 MIN: CPT | Mod: PBBFAC | Performed by: PSYCHIATRY & NEUROLOGY

## 2020-01-13 RX ORDER — PROPRANOLOL HYDROCHLORIDE 60 MG/1
60 CAPSULE, EXTENDED RELEASE ORAL DAILY
Qty: 90 CAPSULE | Refills: 3 | Status: SHIPPED | OUTPATIENT
Start: 2020-01-13 | End: 2020-03-16

## 2020-01-13 RX ORDER — CEPHALEXIN 500 MG/1
500 CAPSULE ORAL EVERY 12 HOURS
Qty: 28 CAPSULE | Refills: 0 | Status: SHIPPED | OUTPATIENT
Start: 2020-01-13 | End: 2020-01-27

## 2020-01-13 NOTE — PATIENT INSTRUCTIONS
TAKE PROPRANOLOL 1 TAB DAILY     YOU WILL BE CONTACTED ONCE APPROVAL FOR BOTOX IS OBTAINED    CHECK VITAMIN LEVELS TODAY

## 2020-01-13 NOTE — PROGRESS NOTES
Follow up :   Improved severity with same near daily frequency of HA with aimovig, benefit of imitrex and robaxin but not flexeril, some improvement in HA with recent improvement in BP    12/2017  Continues with headaches most days but are markedly less severe, may go 1-2 weeks without excedrin, continued relief from imitrex.  Had a pleasant [place] with family.   8/2017  No benefit from GBP, only 2 HA free days per month but feels generally less severe - takes excedrin migraine 1-2 tabs daily  5/2017  Continued daily headache, stopped fioricet, continued relief from imitrex, unable to comply with CPAP due to severe distress caused by objects around her face related to childhood molesation     Headache history:   Age of onset - Teens   Location - Bioccipital goes to crown   Nature of pain - Throbbing   Prodrome - no   Aura - No   Duration of headache - hrs   Time to peak intensity - 1 hr   Pain scale - range of intensity - 7-8/10   Frequency - 4/week , now 5/month   Status Migrainosus history - yes   Time of day of most headaches- anytime   Associated symptoms with the headache:   Meningeal symptoms - photophobia, phonophobia, exercise intolerance +   Nausea/vomitting +   Nasal drainage   Visual blurriness   Pallor/flushing   Dizziness +   Vertigo   Confusion   Impaired concentration +   Pain worsened with physical activity +   Neck pain +   Tension HA:   Location - Bifrontal   Nature of pain - Gripping   Prodrome - no   Aura - No   Duration of headache - hrs   Time to peak intensity - 1 hr   Pain scale - range of intensity - 6/10   Frequency - daily   Time of day of most headaches- morning   Associated symptoms with the headache: none   Headache Triggers: Heat (hot weather, hot baths or showers) , emotional stress +   Weather change +   Other comorbid conditions:   Anxiety - yes   Motion sickness symptom - yes   Treatment history:   Resolution of headache with sleep - yes   Meds tried:   Fioricet - helps    Imitrex - helps   No headache benefit from:  Elavil, effexor, namenda, Mg, robaxin, flexeril, norco, unable to take NSAIDs due to renal failure  topamax - helped with migraine     Headache risk factors:   H/o TBI - no   H/o Meningitis - no   F/h Aneurysms - no   Takes naps during the day   Denies refreshing sleep   Snoring +   Review of Systems   Constitutional: Negative.   Eyes: Negative.   Respiratory: Negative.   Cardiovascular: Negative.   Gastrointestinal: Negative.   Genitourinary: Negative.   Musculoskeletal: LBP +   Skin: Negative.   Neurological:Sleep disturbance + HA   Hematological: Negative.   Psychiatric/Behavioral: Negative.     Objective:    Physical Exam   Constitutional:   She appears well-developed and obese. She is well groomed   HENT:   Head: Atraumatic, oral and nasal mucosa intact   Mallampati - 3   Eyes: Conjunctivae and EOM are normal. Pupils are equal, round, and reactive to light OU   Neck: Neck supple. No thyromegaly present   Cardiovascular: Normal rate and normal heart sounds   No murmur heard   Pulmonary/Chest: Effort normal and breath sounds normal   Musculoskeletal: Normal range of motion. No joint stiffness. No vertebral point tenderness   Skin: Skin is warm and dry   Psychiatric: Normal mood and affect   Neuro exam:   Mental status:   Awake, attentive, Alert, oriented to self, place, year and month   Language function is intact   Naming, repetition and spontaneous meaningful speech expression are intact   Speech fluency is good and speech is clear   Remote and recent memory are good   No findings to suggest executive dysfuntion   Patient has adequate insight   Mood is stable   Cranial Nerves II - XII: intact   Coordination:   No intentional or positional tremor.   Motor:   Normal muscle bulk and symmetry. No fasciculations were noted   No pronator drift   Strength 5/5 shaheed   Sensory: Intact to light touch   Gait: Normal gait. Normal arm swing and turns. Uses cane   Headache Exam:    Bony prominence with tenderness over R parietal   Temples appear symmetric   No V1,V2,V3 distribution allodynia/hyperalgesia shaheed   No facial swelling   No gross TMJ abnormalities   No ptosis   No conj injection   No meningismus   No neck stiffness   No C spine tenderness   No tender points over trapezium   No supraorbital nerve exit point tenderness   No occipital nerve exit point tenderness   No auriculotemporal nerve tenderness   Assessment:       1.  HA (headache) - likely CO2 retention    2.  Obesity    3.  VIJAYA (obstructive sleep apnea)    4.  Occipital neuralgia - improved    5.  Chronic migraine without aura, with intractable migraine, so stated, without mention of status migrainosus + tension HA    6.  Skull lesion - stable for 15 yrs      Headaches multifactorial from migraine, sinus, cervicalgia, TMJ, VIJAYA    Patient has chronic migraines (G43.719) and suffers from headaches more than 15 days a month lasting more than 4 hours a day with no relief of symptoms despite trying multiple medications listed below. Botox treatment was approved for chronic migraines in October 2010.  We are planning for 3 treatments 3 months apart and aiming for at least 50% improvement in the symptoms. If we see no improvement after 3 treatments, we will discontinue the injections.     Plan:    1, prophylactic medication - topamax 200 mg BID, Effexor 225mg/d, Mg, B2 supplementation, start Propranolol   2, Breakthrough headache - Imitrex, tylenol    3. Begin approval for botox      Patient verbalized understanding to plan. I answered all her questions to her satisfaction.     Return for botox

## 2020-01-14 ENCOUNTER — OFFICE VISIT (OUTPATIENT)
Dept: PHYSICAL MEDICINE AND REHAB | Facility: CLINIC | Age: 70
End: 2020-01-14
Payer: MEDICARE

## 2020-01-14 VITALS
SYSTOLIC BLOOD PRESSURE: 127 MMHG | HEART RATE: 89 BPM | HEIGHT: 68 IN | WEIGHT: 292 LBS | DIASTOLIC BLOOD PRESSURE: 91 MMHG | BODY MASS INDEX: 44.25 KG/M2

## 2020-01-14 DIAGNOSIS — M54.6 CHRONIC MIDLINE THORACIC BACK PAIN: ICD-10-CM

## 2020-01-14 DIAGNOSIS — M79.7 FIBROMYALGIA: ICD-10-CM

## 2020-01-14 DIAGNOSIS — G89.29 CHRONIC MIDLINE THORACIC BACK PAIN: ICD-10-CM

## 2020-01-14 DIAGNOSIS — G56.03 BILATERAL CARPAL TUNNEL SYNDROME: ICD-10-CM

## 2020-01-14 DIAGNOSIS — E66.01 MORBID OBESITY WITH BMI OF 40.0-44.9, ADULT: ICD-10-CM

## 2020-01-14 DIAGNOSIS — M47.816 SPONDYLOSIS OF LUMBAR REGION WITHOUT MYELOPATHY OR RADICULOPATHY: ICD-10-CM

## 2020-01-14 DIAGNOSIS — G89.29 CHRONIC NECK PAIN: ICD-10-CM

## 2020-01-14 DIAGNOSIS — M54.2 CHRONIC NECK PAIN: ICD-10-CM

## 2020-01-14 DIAGNOSIS — Z79.891 CHRONIC USE OF OPIATE FOR THERAPEUTIC PURPOSE: ICD-10-CM

## 2020-01-14 DIAGNOSIS — M94.0 COSTOCHONDRITIS: ICD-10-CM

## 2020-01-14 DIAGNOSIS — M47.812 SPONDYLOSIS OF CERVICAL REGION WITHOUT MYELOPATHY OR RADICULOPATHY: ICD-10-CM

## 2020-01-14 DIAGNOSIS — G89.29 CHRONIC MIDLINE LOW BACK PAIN WITHOUT SCIATICA: Primary | ICD-10-CM

## 2020-01-14 DIAGNOSIS — M54.50 CHRONIC MIDLINE LOW BACK PAIN WITHOUT SCIATICA: Primary | ICD-10-CM

## 2020-01-14 PROCEDURE — 99999 PR PBB SHADOW E&M-EST. PATIENT-LVL II: ICD-10-PCS | Mod: PBBFAC,,, | Performed by: PHYSICAL MEDICINE & REHABILITATION

## 2020-01-14 PROCEDURE — 99214 PR OFFICE/OUTPT VISIT, EST, LEVL IV, 30-39 MIN: ICD-10-PCS | Mod: S$PBB,,, | Performed by: PHYSICAL MEDICINE & REHABILITATION

## 2020-01-14 PROCEDURE — 1159F PR MEDICATION LIST DOCUMENTED IN MEDICAL RECORD: ICD-10-PCS | Mod: ,,, | Performed by: PHYSICAL MEDICINE & REHABILITATION

## 2020-01-14 PROCEDURE — 99212 OFFICE O/P EST SF 10 MIN: CPT | Mod: PBBFAC | Performed by: PHYSICAL MEDICINE & REHABILITATION

## 2020-01-14 PROCEDURE — 1159F MED LIST DOCD IN RCRD: CPT | Mod: ,,, | Performed by: PHYSICAL MEDICINE & REHABILITATION

## 2020-01-14 PROCEDURE — 99214 OFFICE O/P EST MOD 30 MIN: CPT | Mod: S$PBB,,, | Performed by: PHYSICAL MEDICINE & REHABILITATION

## 2020-01-14 PROCEDURE — 1125F AMNT PAIN NOTED PAIN PRSNT: CPT | Mod: ,,, | Performed by: PHYSICAL MEDICINE & REHABILITATION

## 2020-01-14 PROCEDURE — 1125F PR PAIN SEVERITY QUANTIFIED, PAIN PRESENT: ICD-10-PCS | Mod: ,,, | Performed by: PHYSICAL MEDICINE & REHABILITATION

## 2020-01-14 PROCEDURE — 99999 PR PBB SHADOW E&M-EST. PATIENT-LVL II: CPT | Mod: PBBFAC,,, | Performed by: PHYSICAL MEDICINE & REHABILITATION

## 2020-01-14 NOTE — PROGRESS NOTES
Subjective:       Patient ID: Cecile Bowen is a 69 y.o. female.    Chief Complaint: No chief complaint on file.    HPI     HISTORY OF PRESENT ILLNESS:  Ms. Bowen is a 69-year-old white female with past medical history of HTN, DM, CKD, hypothyroidism, fibromyalgia, status post left mastectomy in 08/2019, morbid obesity (BMI in the 40s) and OA.  She is followed up in  the [facility] for chronic low back pain due to DJD, status post multiple procedures including RFAs and ESIs, chronic thoracic pain, chronic neck pain and bilateral carpal tunnel syndrome.  Her last visit was on 10/3/19 She was maintained on venlafaxine, p.r.n. hydrocodone/APAP and p.r.n. Methocarbamol.  She was started on diclofenac gel.    The patient is coming to the clinic for followup.  Her back pain has been stable with occasional flare ups.  It is a constant aching pain in the lumbar spine and across her back.  She denies any radiation to her legs.  Her maximum pain is 9/10 and minimum 5-6/10.  Today, it is 6/10.  The patient denies any change in her lower extremity strength.  She has chronic bowel urgency.  She is status post suprapubic catheter due to neurogenic bladder.    Her neck pain has been stable.  It is an intermittent aching pain in the cervical spine.  She denies any radiation to her arms.  Her maximum pain is 7/10 and minimum 3/10.  Today, it is 4/10.  The patient denies any upper extremity weakness.      Her bilateral hand numbness has been under good control.  She has not had to use carpal tunnel splints often.  She continues to complain of generalized aching, fatigue and stiffness due to fibromyalgia.  Her left costochondral junction pain has been worse recently.  She denies any preceding trauma.    She is currently taking venlafaxine XR 75 mg capsules, 3 capsules once per day.  She takes hydrocodone/APAP 10/325 p.r.n. four times per day.  She takes methocarbamol 750 mg p.o. p.r.n., usually one or two tablets  three times per day.  She has Voltaren gel topically for painful joints, usually twice.        Review of Systems   Constitutional: Positive for fatigue. Negative for chills and fever.   Eyes: Negative for visual disturbance.   Respiratory: Positive for shortness of breath.    Cardiovascular: Negative for chest pain.   Gastrointestinal: Positive for constipation and nausea. Negative for abdominal pain and vomiting.   Genitourinary:        S/p suprapubic catheter   Musculoskeletal: Positive for arthralgias, back pain, gait problem, neck pain and neck stiffness. Negative for joint swelling.   Neurological: Positive for weakness and headaches. Negative for dizziness.   Psychiatric/Behavioral: Positive for sleep disturbance. Negative for behavioral problems.       Objective:      Physical Exam   Constitutional: She is oriented to person, place, and time. She appears well-developed and well-nourished.   Coming to the clinic in a manual wheelchair propelled by medical assistant     HENT:   Head: Normocephalic and atraumatic.   Neck: Normal range of motion.   Mild pain at end range.  +ve tenderness.   Pulmonary/Chest: Effort normal.   Musculoskeletal:   BUE:  ROM:   RUE: full.   LUE: full.  Strength:    RUE: 5-/5 at shoulder abduction, 5 elbow flexion, 5 elbow extension, 5 hand .   LUE: 5-/5 at shoulder abduction, 5 elbow flexion, 5 elbow extension, 5 hand .  Sensation to pinprick:   RUE: intact.   LUE: intact.    BLE:  ROM:   RLE: full.   LLE: full.  Knee crepitus:   RLE: +ve.   LLE: +ve.   Strength:    RLE: 5-/5 at hip flexion, 5 knee extension, 5 ankle DF, 5 PF.   LLE: 5-/5 at hip flexion, 5 knee extension, 5 ankle DF, 5 PF.  Sensation to pinprick:     RLE: intact.      LLE: intact.   SLR (sitting):      RLE: -ve.      LLE: -ve.    +ve tenderness over thoracic and lumbar spine.  +ve tenderness over Lt lower costochondral junction.  +ve diffuse tender points over the upper & lower back, anterior chest wall, hips.    Neurological: She is alert and oriented to person, place, and time.   Skin: Skin is warm.   Psychiatric: She has a normal mood and affect. Her behavior is normal.   Vitals reviewed.      Assessment:       1. Chronic midline low back pain without sciatica    2. Spondylosis of lumbar region without myelopathy or radiculopathy    3. Chronic midline thoracic back pain    4. Chronic neck pain    5. Spondylosis of cervical region without myelopathy or radiculopathy    6. Fibromyalgia    7. Bilateral carpal tunnel syndrome    8. Costochondritis    9. Morbid obesity with BMI of 40.0-44.9, adult    10. Chronic use of opiate for therapeutic purpose        Plan:       - NSAIDs will be avoided due to CKD.  - Continue venlafaxine -XR 75 mg capsule, 3 capsules (225 mg total) once daily (for depression but should help with arthritic pain and fibromyalgia).  - Continue HYDROcodone-acetaminophen (NORCO)  mg per tablet; Take 1 tablet by mouth every 6 (six) hours as needed.  - Continue methocarbamol (ROBAXIN) 750 MG Tab; Take 1-2 tablets (750-1,500 mg total) by mouth 3 (three) times daily as needed.  - Increase diclofenac sodium (VOLTAREN) 1 % Gel; Apply 2 g topically 3-4 times daily p.r.n. (to costochondral areas).  - Continue to wear Carpal Tunnel Splints at night as needed.  - Regular home exercise program was encouraged.  - Follow up in about 4 months (around 5/14/2020).     This was a 25 minute visit, 50% of which was spent educating the patient about the diagnosis and the treatment plan.    This note was partly generated with [employer] voice recognition software. I apologize for any possible typographical errors.

## 2020-01-15 LAB — VIT B2 SERPL-MCNC: 15 MCG/L (ref 1–19)

## 2020-01-16 LAB
PYRIDOXAL SERPL-MCNC: 10 UG/L (ref 5–50)
VIT B1 BLD-MCNC: 72 UG/L (ref 38–122)

## 2020-01-17 RX ORDER — TRAZODONE HYDROCHLORIDE 100 MG/1
TABLET ORAL
Qty: 135 TABLET | Refills: 3 | Status: SHIPPED | OUTPATIENT
Start: 2020-01-17 | End: 2021-08-27 | Stop reason: SDUPTHER

## 2020-01-17 NOTE — TELEPHONE ENCOUNTER
Action Required:     I have faxed over PAP forms to your office. The forms need to be completed by the physician for the patient's Aimovig prescription assistance. Please complete the prescription sections of the forms.     Once completed, you can fax them to [facility]. Any questions or concerns regarding the program or completion of the forms, please reach out to [facility].     Thank you,    Namita Driver  OSP [phone] (D)

## 2020-01-20 ENCOUNTER — PES CALL (OUTPATIENT)
Dept: ADMINISTRATIVE | Facility: CLINIC | Age: 70
End: 2020-01-20

## 2020-01-24 ENCOUNTER — TELEPHONE (OUTPATIENT)
Dept: NEUROLOGY | Facility: CLINIC | Age: 70
End: 2020-01-24

## 2020-01-24 ENCOUNTER — TELEPHONE (OUTPATIENT)
Dept: UROLOGY | Facility: CLINIC | Age: 70
End: 2020-01-24

## 2020-01-24 NOTE — TELEPHONE ENCOUNTER
Call placed to Patient, scheduled for 2/21/2020 at 1:40 PM for Botox with Dr. Henao. Patient expressed she was having a rough time as her brother passed away on yesterday. Expressed condolences on behalf of the clinic.

## 2020-01-24 NOTE — TELEPHONE ENCOUNTER
----- Message from Enrique Henao MD sent at 1/13/2020  9:29 AM CST -----  Cande,  Could you do approval for botox for this lady?  CV

## 2020-01-28 ENCOUNTER — PATIENT MESSAGE (OUTPATIENT)
Dept: UROLOGY | Facility: CLINIC | Age: 70
End: 2020-01-28

## 2020-01-28 ENCOUNTER — PATIENT MESSAGE (OUTPATIENT)
Dept: INTERNAL MEDICINE | Facility: CLINIC | Age: 70
End: 2020-01-28

## 2020-01-30 ENCOUNTER — PATIENT MESSAGE (OUTPATIENT)
Dept: PHYSICAL MEDICINE AND REHAB | Facility: CLINIC | Age: 70
End: 2020-01-30

## 2020-01-30 ENCOUNTER — TELEPHONE (OUTPATIENT)
Dept: PHARMACY | Facility: CLINIC | Age: 70
End: 2020-01-30

## 2020-01-30 DIAGNOSIS — M54.50 CHRONIC MIDLINE LOW BACK PAIN WITHOUT SCIATICA: ICD-10-CM

## 2020-01-30 DIAGNOSIS — M79.7 FIBROMYALGIA: ICD-10-CM

## 2020-01-30 DIAGNOSIS — G89.29 CHRONIC MIDLINE LOW BACK PAIN WITHOUT SCIATICA: ICD-10-CM

## 2020-01-30 DIAGNOSIS — G89.29 CHRONIC NECK PAIN: ICD-10-CM

## 2020-01-30 DIAGNOSIS — M54.2 CHRONIC NECK PAIN: ICD-10-CM

## 2020-01-30 RX ORDER — INSULIN GLARGINE 100 [IU]/ML
INJECTION, SOLUTION SUBCUTANEOUS
Qty: 10 ML | Refills: 6 | Status: SHIPPED | OUTPATIENT
Start: 2020-01-30 | End: 2020-02-07 | Stop reason: DRUGHIGH

## 2020-01-31 ENCOUNTER — TELEPHONE (OUTPATIENT)
Dept: PHARMACY | Facility: CLINIC | Age: 70
End: 2020-01-31

## 2020-01-31 ENCOUNTER — OFFICE VISIT (OUTPATIENT)
Dept: HEMATOLOGY/ONCOLOGY | Facility: CLINIC | Age: 70
End: 2020-01-31
Payer: MEDICARE

## 2020-01-31 VITALS
HEIGHT: 68 IN | RESPIRATION RATE: 14 BRPM | HEART RATE: 97 BPM | BODY MASS INDEX: 44.4 KG/M2 | SYSTOLIC BLOOD PRESSURE: 127 MMHG | OXYGEN SATURATION: 91 % | DIASTOLIC BLOOD PRESSURE: 79 MMHG | TEMPERATURE: 98 F

## 2020-01-31 DIAGNOSIS — Z79.811 PROPHYLACTIC USE OF ANASTROZOLE (ARIMIDEX): ICD-10-CM

## 2020-01-31 DIAGNOSIS — C50.612 MALIGNANT NEOPLASM OF AXILLARY TAIL OF LEFT BREAST IN FEMALE, ESTROGEN RECEPTOR POSITIVE: Primary | ICD-10-CM

## 2020-01-31 DIAGNOSIS — Z17.0 MALIGNANT NEOPLASM OF AXILLARY TAIL OF LEFT BREAST IN FEMALE, ESTROGEN RECEPTOR POSITIVE: Primary | ICD-10-CM

## 2020-01-31 PROCEDURE — 99213 PR OFFICE/OUTPT VISIT, EST, LEVL III, 20-29 MIN: ICD-10-PCS | Mod: S$PBB,,, | Performed by: INTERNAL MEDICINE

## 2020-01-31 PROCEDURE — 99999 PR PBB SHADOW E&M-EST. PATIENT-LVL V: CPT | Mod: PBBFAC,,, | Performed by: INTERNAL MEDICINE

## 2020-01-31 PROCEDURE — 99999 PR PBB SHADOW E&M-EST. PATIENT-LVL V: ICD-10-PCS | Mod: PBBFAC,,, | Performed by: INTERNAL MEDICINE

## 2020-01-31 PROCEDURE — 99215 OFFICE O/P EST HI 40 MIN: CPT | Mod: PBBFAC | Performed by: INTERNAL MEDICINE

## 2020-01-31 PROCEDURE — 99213 OFFICE O/P EST LOW 20 MIN: CPT | Mod: S$PBB,,, | Performed by: INTERNAL MEDICINE

## 2020-01-31 RX ORDER — INSULIN GLARGINE 100 [IU]/ML
INJECTION, SOLUTION SUBCUTANEOUS
Qty: 15 SYRINGE | Refills: 6 | Status: SHIPPED | OUTPATIENT
Start: 2020-01-31 | End: 2021-03-19 | Stop reason: SDUPTHER

## 2020-01-31 RX ORDER — HYDROCODONE BITARTRATE AND ACETAMINOPHEN 10; 325 MG/1; MG/1
1 TABLET ORAL EVERY 6 HOURS PRN
Qty: 120 TABLET | Refills: 0 | Status: SHIPPED | OUTPATIENT
Start: 2020-02-02 | End: 2020-03-02 | Stop reason: SDUPTHER

## 2020-01-31 NOTE — TELEPHONE ENCOUNTER
Call for followup on Aimovig. No answer, no ability to leave voicemail. Sent [employer] message.     David Santos, PharmD  Clinical Pharmacist  [facility]  P: [phone]

## 2020-01-31 NOTE — PROGRESS NOTES
Subjective:       Patient ID: Cecile Bowen is a 69 y.o. female.    Chief Complaint: No chief complaint on file.    HPI    Ms. Bowen returns today for follow up.  She has been on anastrazole since October 1, 2019, and has tolerated it well so far.   Briefly, she is a pleasant 69-year-old female with multiple comorbidities who on 08/01/2019, underwent a left   modified radical mastectomy with sentinel lymph node biopsy for a 1.2 cm grade 1 carcinoma that was ER strongly positive, WI positive and HER-2 negative.   Resection margins were clear and white.  Unfortunately, one of two sentinel lymph nodes had micrometastases.    Of note, a MammaPrint was low score predicting for 97.8% probability of disease free survival at five years with hormonal management.  She was deemed a poor candidate for chemotherapy and she was started on anastrazole in mid September 2019.    Her DXA scan has shown osteopenia of the L spine and the hip.    Review of Systems    Overall she feels well.  She mentions her fatigue and SOB with exertion.  She is using a scooter for ambulation due to her DJD.   She denies any anxiety, depression, easy bruising, fevers, chills, night  sweats, weight loss, nausea, vomiting, diarrhea, constipation, diplopia, blurred vision, chest pain, palpitations, breast pain, abdominal pain, extremity pain, or difficulty ambulating.  The remainder of the ten-point ROS, including general, skin, lymph, H/N, cardiorespiratory, GI, , Neuro, Endocrine, and psychiatric is negative.     Objective:      Physical Exam      She is alert, oriented to time, place, person, pleasant, well      nourished, in no acute physical distress.   She was examined on her scooter as she is not able to climb on the examination table.                                  VITAL SIGNS:  Reviewed                                      HEENT:  Normal.  There are no nasal, oral, lip, gingival, auricular, lid,    or conjunctival lesions.  Mucosae are  moist and pink, and there is no        thrush.  Pupils are equal, reactive to light and accommodation.              Extraocular muscle movements are intact.  She is edentulous.  There is no frontal or maxillary tenderness.                                     NECK:  Supple without JVD, adenopathy, or thyromegaly.                       LUNGS:  Clear to auscultation without wheezing, rales, or rhonchi.           CARDIOVASCULAR:  Reveals an S1, S2, no murmurs, no rubs, no gallops.         ABDOMEN:  Soft, morbidly obese, nontender, without organomegaly.  Bowel sounds are    present. She does have a permanent suprapubic cystostomy.                                                                    EXTREMITIES:  No cyanosis, clubbing, or edema.                               BREASTS:  She is status post left mastectomy with a well-healed incision and no evidence of a chest wall recurrence.  There are no masses in the right breast.                                 LYMPHATIC:  There is no cervical, axillary, or supraclavicular adenopathy.   SKIN:  Warm and moist, without petechiae, rashes, induration, or ecchymoses.           NEUROLOGIC:  DTRs are 0-1+ bilaterally, symmetrical, motor function is 5/5,  and cranial nerves are  within normal limits.    Assessment:       1. 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 Prophylactic use of anastrozole (Arimidex)      2.    DM, Morbid obesity, extensive DJD, s/p suprabubic cystostomy.  4.      Osteopenia  Plan:         I had a long discussion with her;   She will remain on anastrazole through September 2024.  I will see her again in 3 months.    Her multiple questions were answered to her satisfaction.

## 2020-02-01 RX ORDER — SODIUM BICARBONATE 650 MG/1
TABLET ORAL
Qty: 270 TABLET | Refills: 1 | Status: SHIPPED | OUTPATIENT
Start: 2020-02-01 | End: 2020-08-12

## 2020-02-03 ENCOUNTER — OUTPATIENT CASE MANAGEMENT (OUTPATIENT)
Dept: ADMINISTRATIVE | Facility: OTHER | Age: 70
End: 2020-02-03

## 2020-02-03 RX ORDER — INSULIN GLARGINE 100 [IU]/ML
INJECTION, SOLUTION SUBCUTANEOUS
Qty: 15 ML | Refills: 6 | Status: SHIPPED | OUTPATIENT
Start: 2020-02-03 | End: 2020-04-29

## 2020-02-03 NOTE — PROGRESS NOTES
Outpatient Care Management  Plan of Care Follow Up Visit    Patient: Cecile Bowen  MRN: [id number]  Date of Service: 02/03/2020  Completed by: Ezequiel Cornejo RN  Referral Date: 09/25/2019  Program: Disease Management (Diabetes & COPD)    Reason for Visit   Patient presents with    Update Plan Of Care     2/3/2020       Brief Summary: Contacted patient for F/U.  Patient reported that she recently lost her brother on Jan 23rd.  Patient reported that she is grieving but managing well.  Reported that her Blood pressures have been good, with occasional use of her clonidine. Patient reported that she is having some difficulty with keeping her BG WNL, but continues to follow treatment recommendations. Unable to review readings. Patient reported that she is having trouble affording her Toujeo and requested to be switched to Lantus as this is more affordable. Chart review completed and RX for Lantus was sent to [facility]. Patient would like this RX to be sent to [facility] as listed in her chart. Will send message to DIANN العراقي with patient's request. Patient reported that she did attended her appt with Dr. Henao and will be going through Botox treatments for her migraines on Feb 21st. Patient appreciative of outreach. Encouraged to contact with needs. Plan was to close case, will review with LMSW and provide support during time of grief. Discussed completing F/U call with patient, who is in agreement with call after first botox procedure.     Next Steps:   F/U in 2 weeks as discussed  Continue education on Hypertension and CKD.   Collaborate with [facility] LMSW to assist with needs      Patient Summary     Involvement of Care:  Do I have permission to speak with other family members about your care?       Patient Reported Labs & Vitals:  1.  Any Patient Reported Labs & Vitals?     2.  Patient Reported Blood Pressure:     3.  Patient Reported Pulse:     4.  Patient Reported Weight (Kg):     5.  Patient Reported Blood  Glucose (mg/dl):       Medical History:  Reviewed medical history with patient and/or caregiver    Social History:  Reviewed social history with patient and/or caregiver    Clinical Assessment     Reviewed and provided basic information on available community resources for mental health, transportation, wellness resources, and palliative care programs with patient and/or caregiver.    Complex Care Plan     Care plan was discussed and completed today with input from patient and/or caregiver.    Goals      Patient/caregiver will accept life style changes to manage and improve CKD prior to discharge from OPCM. - Priority: High      Overall Time to Completion  3 months from 10/15/2019     OPCM Identified Patient Barriers:  Advanced Care Planning:  No  Housing:  no  Lab Adherence:  no  Cognitive/Behavioral Health:  no  Communication:  no  Health Literacy:  no  Equipment/Supplies/Services:  yes  Culural/Faith Beliefs:  no  Fall Risk:  Neg     OPCM Identified Disease Education Barriers:  Hypertension:  Pos  Chronic Kidney Disease:  Pos       Short Term Goals    Patient/caregiver will verbalize current eGFR value and eGFR goal within 1 month.  Interventions   · Review current eGFR with patient 27.5. Done 2/3/20  · Review eGFR goal with patient, >60. Done 2/3/20    10/29/19-Patient/Caregiver agrees to review resource material on CKD by  Next encounter. (2/3/2020-Reviewed)  Patient/Caregiver agrees to OPCM follow up within 2 weeks to assess progress to goal.      Status  · Met      Patient/caregiver will verbalize 2 signs and symptoms of Kidney Disease that would necessitate a call to healthcare provider or 911 within 2 months.    Signs and Symptoms of Kidney Disease   Call your healthcare provider right away if any of these occur:   Nausea or vomiting   Fever of 100.4°F (38°C) or higher, or as directed by your healthcare provider   Unexpected weight gain or swelling in the legs, ankles, or around the eyes   Decrease or  absent urine output   Call 911 if you have any of the following:   Severe weakness, dizziness, fainting, drowsiness, or confusion   Chest pain or shortness of breath   Heart beating fast, slow, or irregularly    Interventions   · Assess for retention of at least 3 signs and symptoms of Kidney Disease that would necessitate a call to healthcare provider or 911.  · Recognize and provide educational material (KRAMES) on Chronic Kidney Disease. - Mailed  · Review with patient/caregiver the signs and symptoms of Kidney Disease that would necessitate a call to healthcare provider or 911.    10/29/19-Patient/Caregiver agrees to recognize 2 s/sx of kidney disease that need a call to her HCP or 911 by End of enrollment.  Patient/Caregiver agrees to OPCM follow up within 2 weeks to assess progress to goal.  (12/19-in progress)      Status  · Partially met    Patient/caregiver will verbalize 3 ways of preventing complications due to Kidney Disease within 2 months.   Ways of Preventing Complications of Kidney Disease  If you have diabetes, talk with your healthcare provider about keeping your blood sugar under control. Ask if you need to make and changes to your diet, lifestyle, or medicines.   If you have high blood pressure:  ? Take prescribed medicine to lower your blood pressure to the recommended goal of less than 130/80.  ? Start a regular exercise program that you enjoy. Check with your healthcare provider to be sure your planned exercise program is right for you.  ? Eat less salt (sodium). Your healthcare provider can tell you how much salt per day is safe for you.  If you are overweight, talk with your healthcare provider about a weight loss plan.  Most people with CKD need to follow a special diet.  Be sure you understand yours. In general, you will need to limit protein, salt, potassium, and phosphorus. You also need to limit how much fluid you drink.   CKD is a risk factor for heart disease. Talk with your  healthcare provider about any other risk factors you might have and what you can do to lessen them.  Talk with your healthcare provider about any medicines you are taking to find out if they need to be reduced or stopped.  Don't use the following over-the-counter medicines, or consult your healthcare provider before using:  ? Aspirin and NSAIDs such as ibuprofen or naproxen. Using acetaminophen for fever or pain is OK.  ? Laxatives and antacids containing magnesium or aluminum  ? Fleet or phospho soda enemas containing phosphorus  ? Certain stomach acid-blocking medicine such as cimetidine or ranitidine   ? Decongestants containing pseudoephedrine   ? Herbal supplements    Interventions   · Assess for retention of at least 3 ways of preventing complications due to Kidney Disease.    · Empower patient/caregiver to discuss treatment plan with Physician/care team. Ongoing  · Educate on importance of compliance with annual vaccinations. Done 10/15  · Educate on importance of compliance with Physician follow-ups. Ongoing  · Educate on importance of compliance with preventive screenings. Ongoing  · Educate on importance of compliance with scheduled laboratory testing and procedure ongoing.  · Educate on importance of Dietary Compliance. Ongoing  · Educate on importance of Medication Compliance. Ongoing  · Recognize and provide educational material (KRAMES) on Chronic Kidney Disease. - Mailed  · Review with patient/caregiver ways of prevention complications due to Kidney Disease.       Status  · Partially met               Clinical Reference Documents Added to Patient Instructions       Document    CHRONIC KIDNEY DISEASE (CKD) (ENGLISH)    CHRONIC KIDNEY DISEASE, DIET FOR (ENGLISH)    HEART-HEALTHY FOOD, EATING: USING THE DASH PLAN (ENGLISH)    HIGH BLOOD PRESSURE, CONTROLLING (ENGLISH)    KIDNEY DISEASE, ANEMIA AND (ENGLISH)    KIDNEY DISEASE, HIGH BLOOD PRESSURE AND (ENGLISH)    STROKE AND HIGH BLOOD PRESSURE,  UNDERSTANDING THE LINK BETWEEN (ENGLISH)             Patient/caregiver will have knowledge of resources available in order to obtain the services that are needed prior to discharge from OPCM. - Priority: High      Overall Time to Completion  2 months from 10/15/2019     OPCM Identified Patient Barriers:      OPCM Identified Education Barriers:  Education Barrier for Hypertension--Care Plan Created  Education Barrier for CKD--Care Plan Created      Short Term Goals   Patient/caregiver will fill out any applications that were sent to patient to complete from OPCM  within 1 month.  Interventions   · Refer to Outpatient Case Management Social Worker. Done 10/14, 10/29-pending outreach     Status  · Partially met      Patient/caregiver will have contact information for identified community resources IE:OPCM  for follow-up within 1 month.  Interventions   · Refer to Outpatient Case Management Social Worker. Done 10/14, 10/29-pending outreach     Status  · Partially met      Patient/caregiver will notify RN CCM if patient does not hear from OPCM  within 2 weeks.  Interventions   · Refer to Outpatient Case Management Social Worker. Done 10/14, 10/29-pending outreach     Status  · Partially met                Patient Instructions     Instructions were provided via the [facility] patient resources and are available for the patient to view on the patient portal.    Follow up in about 3 weeks (around 2/24/2020) for RN Follow UP.      Todays OPC Self-Management Care Plan was developed with the patients/caregivers input and was based on identified barriers from todays assessment.  Goals were written today with the patient/caregiver and the patient has agreed to work towards these goals to improve his/her overall well-being. Patient verbalized understanding of the care plan, goals, and all of today's instructions. Encouraged patient/caregiver to communicate with his/her physician and health  care team about health conditions and the treatment plan.  Provided my contact information today and encouraged patient/caregiver to call me with any questions as needed.

## 2020-02-06 ENCOUNTER — TELEPHONE (OUTPATIENT)
Dept: INTERNAL MEDICINE | Facility: CLINIC | Age: 70
End: 2020-02-06

## 2020-02-07 ENCOUNTER — OFFICE VISIT (OUTPATIENT)
Dept: INTERNAL MEDICINE | Facility: CLINIC | Age: 70
End: 2020-02-07
Payer: MEDICARE

## 2020-02-07 VITALS
HEIGHT: 68 IN | SYSTOLIC BLOOD PRESSURE: 130 MMHG | DIASTOLIC BLOOD PRESSURE: 60 MMHG | WEIGHT: 293 LBS | HEART RATE: 88 BPM | BODY MASS INDEX: 44.41 KG/M2

## 2020-02-07 DIAGNOSIS — Z99.89 DEPENDENCE ON OTHER ENABLING MACHINES AND DEVICES: ICD-10-CM

## 2020-02-07 DIAGNOSIS — Z79.891 LONG TERM PRESCRIPTION OPIATE USE: ICD-10-CM

## 2020-02-07 DIAGNOSIS — F33.1 MAJOR DEPRESSIVE DISORDER, RECURRENT EPISODE, MODERATE: ICD-10-CM

## 2020-02-07 DIAGNOSIS — E55.9 VITAMIN D DEFICIENCY DISEASE: ICD-10-CM

## 2020-02-07 DIAGNOSIS — M85.80 OSTEOPENIA, UNSPECIFIED LOCATION: ICD-10-CM

## 2020-02-07 DIAGNOSIS — G47.33 OSA (OBSTRUCTIVE SLEEP APNEA): ICD-10-CM

## 2020-02-07 DIAGNOSIS — Z90.12 S/P LEFT MASTECTOMY: ICD-10-CM

## 2020-02-07 DIAGNOSIS — G43.719 INTRACTABLE CHRONIC MIGRAINE WITHOUT AURA AND WITHOUT STATUS MIGRAINOSUS: ICD-10-CM

## 2020-02-07 DIAGNOSIS — N25.81 SECONDARY HYPERPARATHYROIDISM, RENAL: ICD-10-CM

## 2020-02-07 DIAGNOSIS — R26.9 ABNORMALITY OF GAIT AND MOBILITY: ICD-10-CM

## 2020-02-07 DIAGNOSIS — E11.59 HYPERTENSION ASSOCIATED WITH DIABETES: ICD-10-CM

## 2020-02-07 DIAGNOSIS — E66.9 DIABETES MELLITUS TYPE 2 IN OBESE: ICD-10-CM

## 2020-02-07 DIAGNOSIS — Z93.59 CHRONIC SUPRAPUBIC CATHETER: ICD-10-CM

## 2020-02-07 DIAGNOSIS — N31.9 NEUROGENIC BLADDER: ICD-10-CM

## 2020-02-07 DIAGNOSIS — M47.816 LUMBAR SPONDYLOSIS: ICD-10-CM

## 2020-02-07 DIAGNOSIS — G43.809 CERVICOGENIC MIGRAINE: ICD-10-CM

## 2020-02-07 DIAGNOSIS — R33.9 URINARY RETENTION: ICD-10-CM

## 2020-02-07 DIAGNOSIS — R51.9 CHRONIC DAILY HEADACHE: ICD-10-CM

## 2020-02-07 DIAGNOSIS — E78.5 HYPERLIPIDEMIA ASSOCIATED WITH TYPE 2 DIABETES MELLITUS: ICD-10-CM

## 2020-02-07 DIAGNOSIS — Z17.0 MALIGNANT NEOPLASM OF LEFT BREAST IN FEMALE, ESTROGEN RECEPTOR POSITIVE, UNSPECIFIED SITE OF BREAST: ICD-10-CM

## 2020-02-07 DIAGNOSIS — E03.9 HYPOTHYROIDISM, UNSPECIFIED TYPE: ICD-10-CM

## 2020-02-07 DIAGNOSIS — M47.816 FACET ARTHRITIS OF LUMBAR REGION: ICD-10-CM

## 2020-02-07 DIAGNOSIS — Z79.811 PROPHYLACTIC USE OF ANASTROZOLE (ARIMIDEX): ICD-10-CM

## 2020-02-07 DIAGNOSIS — N18.4 CKD STAGE 4 DUE TO TYPE 2 DIABETES MELLITUS: ICD-10-CM

## 2020-02-07 DIAGNOSIS — D50.8 OTHER IRON DEFICIENCY ANEMIA: ICD-10-CM

## 2020-02-07 DIAGNOSIS — Z00.00 ENCOUNTER FOR PREVENTIVE HEALTH EXAMINATION: Primary | ICD-10-CM

## 2020-02-07 DIAGNOSIS — M47.896 OTHER OSTEOARTHRITIS OF SPINE, LUMBAR REGION: ICD-10-CM

## 2020-02-07 DIAGNOSIS — C50.912 MALIGNANT NEOPLASM OF LEFT BREAST IN FEMALE, ESTROGEN RECEPTOR POSITIVE, UNSPECIFIED SITE OF BREAST: ICD-10-CM

## 2020-02-07 DIAGNOSIS — Z93.6 PRESENCE OF UROSTOMY: ICD-10-CM

## 2020-02-07 DIAGNOSIS — E66.01 MORBID OBESITY DUE TO EXCESS CALORIES: ICD-10-CM

## 2020-02-07 DIAGNOSIS — E11.22 CKD STAGE 4 DUE TO TYPE 2 DIABETES MELLITUS: ICD-10-CM

## 2020-02-07 DIAGNOSIS — R91.1 PULMONARY NODULE: ICD-10-CM

## 2020-02-07 DIAGNOSIS — I15.2 OBESITY, DIABETES, AND HYPERTENSION SYNDROME: ICD-10-CM

## 2020-02-07 DIAGNOSIS — E66.9 OBESITY, DIABETES, AND HYPERTENSION SYNDROME: ICD-10-CM

## 2020-02-07 DIAGNOSIS — G43.909 MIXED MIGRAINE AND MUSCLE CONTRACTION HEADACHE: ICD-10-CM

## 2020-02-07 DIAGNOSIS — G44.209 MIXED MIGRAINE AND MUSCLE CONTRACTION HEADACHE: ICD-10-CM

## 2020-02-07 DIAGNOSIS — D64.3 SIDEROBLASTIC ANEMIA: ICD-10-CM

## 2020-02-07 DIAGNOSIS — E11.59 OBESITY, DIABETES, AND HYPERTENSION SYNDROME: ICD-10-CM

## 2020-02-07 DIAGNOSIS — G89.4 CHRONIC PAIN SYNDROME: ICD-10-CM

## 2020-02-07 DIAGNOSIS — I15.2 HYPERTENSION ASSOCIATED WITH DIABETES: ICD-10-CM

## 2020-02-07 DIAGNOSIS — E11.69 OBESITY, DIABETES, AND HYPERTENSION SYNDROME: ICD-10-CM

## 2020-02-07 DIAGNOSIS — N13.30 HYDRONEPHROSIS, UNSPECIFIED HYDRONEPHROSIS TYPE: ICD-10-CM

## 2020-02-07 DIAGNOSIS — E11.69 HYPERLIPIDEMIA ASSOCIATED WITH TYPE 2 DIABETES MELLITUS: ICD-10-CM

## 2020-02-07 DIAGNOSIS — E11.69 DIABETES MELLITUS TYPE 2 IN OBESE: ICD-10-CM

## 2020-02-07 PROCEDURE — 99215 OFFICE O/P EST HI 40 MIN: CPT | Mod: PBBFAC,PO | Performed by: NURSE PRACTITIONER

## 2020-02-07 PROCEDURE — 99214 OFFICE O/P EST MOD 30 MIN: CPT | Mod: S$GLB,ICN,, | Performed by: NURSE PRACTITIONER

## 2020-02-07 PROCEDURE — 99999 PR PBB SHADOW E&M-EST. PATIENT-LVL V: CPT | Mod: PBBFAC,,, | Performed by: NURSE PRACTITIONER

## 2020-02-07 PROCEDURE — 99999 PR PBB SHADOW E&M-EST. PATIENT-LVL V: ICD-10-PCS | Mod: PBBFAC,,, | Performed by: NURSE PRACTITIONER

## 2020-02-07 PROCEDURE — 99214 PR OFFICE/OUTPT VISIT, EST, LEVL IV, 30-39 MIN: ICD-10-PCS | Mod: S$GLB,ICN,, | Performed by: NURSE PRACTITIONER

## 2020-02-07 RX ORDER — ERGOCALCIFEROL 1.25 MG/1
50000 CAPSULE ORAL
Qty: 12 CAPSULE | Refills: 3 | Status: SHIPPED | OUTPATIENT
Start: 2020-02-07 | End: 2021-04-26

## 2020-02-07 NOTE — PROGRESS NOTES
I offered to discuss end of life issues, including information on how to make advance directives that the patient could use to name someone who would make medical decisions on their behalf if they became too ill to make themselves.    ___Patient declined  _X_Patient reports that her sister will make health care decisions

## 2020-02-07 NOTE — PATIENT INSTRUCTIONS
Counseling and Referral of Other Preventative  (Italic type indicates deductible and co-insurance are waived)    Patient Name: Cecile Bowen  Today's Date: 2/7/2020    Health Maintenance       Date Due Completion Date    TETANUS VACCINE 09/25/2020 (Originally 7/12/1968) Consider obtaining    Shingles Vaccine (2 of 3) 09/25/2020 (Originally 2/10/2014) 12/16/2013    Lipid Panel 06/26/2020 6/26/2019    Mammogram 07/08/2020 7/8/2019    Hemoglobin A1c 07/09/2020 1/9/2020    Foot Exam 10/11/2020 10/11/2019 (Done)    Override on 10/11/2019: Done    Override on 9/27/2017: Done    Override on 10/6/2016: Done    Override on 9/19/2016: Done    Eye Exam 10/28/2020 10/28/2019    Pneumococcal Vaccine (65+ High/Highest Risk) (2 of 2 - PPSV23) 09/19/2021 9/27/2017    Colonoscopy 01/13/2022 1/13/2017    DEXA SCAN 10/29/2022 10/29/2019        No orders of the defined types were placed in this encounter.    The following information is provided to all patients.  This information is to help you find resources for any of the problems found today that may be affecting your health:                Living healthy guide: www.[facility].[place].gov      Understanding Diabetes: www.diabetes.org      Eating healthy: www.cdc.gov/healthyweight      CDC home safety checklist: www.cdc.gov/steadi/patient.html      Agency on Aging: www.goea.[place].gov      Alcoholics anonymous (AA): www.aa.org      Physical Activity: www.zenon.nih.gov/dl7htjp      Tobacco use: www.quitwithusla.org

## 2020-02-07 NOTE — PROGRESS NOTES
"Cecile Bowen presented for a  Medicare AWV and comprehensive Health Risk Assessment today. The following components were reviewed and updated:    · Medical history  · Family History  · Social history  · Allergies and Current Medications  · Health Risk Assessment  · Health Maintenance  · Care Team     ** See Completed Assessments for Annual Wellness Visit within the encounter summary.**       The following assessments were completed:  · Living Situation  · CAGE  · Depression Screening  · Timed Get Up and Go  · Whisper Test  · Cognitive Function Screening      ·   · Nutrition Screening  · ADL Screening  · PAQ Screening    Vitals:    02/07/20 1335   BP: 130/60   BP Location: Right arm   Pulse: 88   Weight: 133.3 kg (293 lb 14 oz)   Height: 5' 8" (1.727 m)     Body mass index is 44.68 kg/m².  Physical Exam   Constitutional: She is oriented to person, place, and time.   Obese   HENT:   Head: Normocephalic.   Cardiovascular: Normal rate and regular rhythm.   Pulmonary/Chest: Effort normal and breath sounds normal.   Abdominal: Soft. Bowel sounds are normal.   Musculoskeletal: She exhibits no edema.   In electric scooter   Neurological: She is alert and oriented to person, place, and time.   Skin: Skin is warm and dry.   Psychiatric: She has a normal mood and affect.   Nursing note and vitals reviewed.        Diagnoses and health risks identified today and associated recommendations/orders:    1. Encounter for preventive health examination  Here for Health Risk Assessment/Annual Wellness Visit.  Health maintenance reviewed and updated. Follow up in one year.    2. Hypertension associated with diabetes  Chronic, stable on current medications. Followed by PCP.    3. Hyperlipidemia associated with type 2 diabetes mellitus  Chronic, stable on current medications. Followed by PCP.    4. Pulmonary nodule  Chronic, stable. Followed by PCP.    5. Uncontrolled type 2 diabetes mellitus with chronic kidney disease, with long-term " current use of insulin  Chronic, not a goal, last A1c 8.9. Medications adjusted. Followed by PCP, Endocrinology, Nephrology.    6. CKD stage 4 due to type 2 diabetes mellitus  Chronic, stable. Followed by PCP, Nephrology.    7. Secondary hyperparathyroidism, renal  Chronic, stable. Followed by PCP, Nephrology.    8. Diabetes mellitus type 2 in obese  Chronic, not a goal, last A1c 8.9. Medications adjusted. Followed by PCP, Endocrinology, Nephrology.    9. Morbid obesity due to excess calories  Chronic, stable. Followed by PCP.    10. Obesity, diabetes, and hypertension syndrome  Chronic, stable on current medications. Followed by PCP.    11. Vitamin D deficiency disease  Chronic, stable on current medications. Followed by PCP.    12. Hypothyroidism, unspecified type  Chronic, stable on current medication. Followed by PCP.    13. Other iron deficiency anemia  Chronic, stable on current medication. Followed by PCP.    14. Sideroblastic anemia  Chronic, stable. Followed by PCP.    15. Malignant neoplasm of left breast in female, estrogen receptor positive, unspecified site of breast  Chronic, stable on current medication. Followed by PCP, Oncology.    16. S/P left mastectomy  Stable. Followed by Oncology    17. Prophylactic use of anastrozole (Arimidex)  Chronic, stable. Followed by Oncology    18. Neurogenic bladder  Chronic, stable with suprapubic catheter. Followed by PCP, Urology.    19. Presence of urostomy  Chronic, stable. Followed by Urology    20. Urinary retention  Chronic, stable with suprapubic catheter. Followed by Urology.    21. Chronic suprapubic catheter  Chronic, stable. Followed by Urology.    22. Hydronephrosis, unspecified hydronephrosis type  Chronic, stable with suprapubic catheter. Followed by Urology, PCP.    23. VIJAYA (obstructive sleep apnea)  Chronic, unable to tolerate CPAP. Followed by PCP.    24. Osteopenia, unspecified location  Chronic, stable on current medications. Followed by  PCP.    25. Major depressive disorder, recurrent episode, moderate  Chronic, stable on current medication. PHQ-2 score 1. Brother  2 weeks ago. Followed by PCP.    26. Mixed migraine and muscle contraction headache  Chronic, stable on current medications. Followed by PCP, Neurology.    27. Chronic daily headache  Chronic, stable on current medications. Followed by PCP, Neurology.    28. Intractable chronic migraine without aura and without status migrainosus  Chronic, stable on current medications. Followed by PCP, Neurology.    29. Cervicogenic migraine  Chronic, stable on current medications. Followed by PCP, Neurology.    30. Chronic pain syndrome  Chronic, stable on current medications. Followed by PCP, Rheumatology, Physical Medicine.    31. Other osteoarthritis of spine, lumbar region  Chronic, stable on current medications. Followed by PCP, Rheumatology, Physical Medicine.    32. Lumbar spondylosis  Chronic, stable on current medications. Followed by PCP, Rheumatology, Physical Medicine.    33. Facet arthritis of lumbar region  Chronic, stable on current medications. Followed by PCP, Rheumatology, Physical Medicine.    34. Long term prescription opiate use  Chronic, stable. Followed by PCP, Rheumatology, Physical Medicine.    35. Dependence on other enabling machines and devices  Chronic, uses cane in house, scooter when goes out. One fall last month - required [employer] to get off of floor. Followed by PCP.    36. Abnormality of gait and mobility  Chronic, uses cane in house, scooter when goes out. One fall last month - required [employer] to get off of floor. Followed by PCP.      Provided Cecile with a 5-10 year written screening schedule and personal prevention plan. Recommendations were developed using the USPSTF age appropriate recommendations. Education, counseling, and referrals were provided as needed. After Visit Summary printed and given to patient which includes a list of additional  screenings\tests needed.    Follow up in 5 days (on 2/12/2020).    Mally Neumann, NP

## 2020-02-09 ENCOUNTER — PATIENT MESSAGE (OUTPATIENT)
Dept: UROLOGY | Facility: CLINIC | Age: 70
End: 2020-02-09

## 2020-02-09 PROBLEM — E11.40 TYPE 2 DIABETES MELLITUS WITH DIABETIC NEUROPATHY: Status: ACTIVE | Noted: 2020-02-09

## 2020-02-10 ENCOUNTER — TELEPHONE (OUTPATIENT)
Dept: NEUROLOGY | Facility: CLINIC | Age: 70
End: 2020-02-10

## 2020-02-10 RX ORDER — BUTALBITAL, ACETAMINOPHEN AND CAFFEINE 50; 325; 40 MG/1; MG/1; MG/1
TABLET ORAL
Qty: 10 TABLET | Refills: 0 | Status: SHIPPED | OUTPATIENT
Start: 2020-02-10 | End: 2020-04-01 | Stop reason: SDUPTHER

## 2020-02-10 NOTE — TELEPHONE ENCOUNTER
Spoke with ciro stated that she still  Have not receive the re fax either informed that it was sent to be scan stated that  She will resend and I will fax it back ASAP

## 2020-02-11 ENCOUNTER — TELEPHONE (OUTPATIENT)
Dept: INTERNAL MEDICINE | Facility: CLINIC | Age: 70
End: 2020-02-11

## 2020-02-11 NOTE — TELEPHONE ENCOUNTER
----- Message from Sima Weldon sent at 2/11/2020  3:44 PM CST -----  Contact: self   Pt states she had to cancel her 2/12/2020 EPP appt due to her brother passing away at home.

## 2020-02-12 RX ORDER — LEVOTHYROXINE SODIUM 50 UG/1
TABLET ORAL
Qty: 90 TABLET | Refills: 2 | Status: SHIPPED | OUTPATIENT
Start: 2020-02-12 | End: 2020-12-21

## 2020-02-13 ENCOUNTER — TELEPHONE (OUTPATIENT)
Dept: UROLOGY | Facility: CLINIC | Age: 70
End: 2020-02-13

## 2020-02-13 ENCOUNTER — PATIENT MESSAGE (OUTPATIENT)
Dept: UROLOGY | Facility: CLINIC | Age: 70
End: 2020-02-13

## 2020-02-13 NOTE — TELEPHONE ENCOUNTER
----- Message from Anni Martinez MA sent at 2/13/2020 10:47 AM CST -----  Contact: [phone]   Pt has an appt today at 2:00 that she is unable to keep.  I offered to reschedule but she said she wants to talk to Tesha or the nurse first to explain why she is can canceling .

## 2020-02-13 NOTE — TELEPHONE ENCOUNTER
Returned pts call about appt scheduled today at 2pm. Pt refused to speak with me, she only wanted to speak with Tesha.

## 2020-02-14 ENCOUNTER — PATIENT MESSAGE (OUTPATIENT)
Dept: NEPHROLOGY | Facility: CLINIC | Age: 70
End: 2020-02-14

## 2020-02-21 ENCOUNTER — TELEPHONE (OUTPATIENT)
Dept: NEUROLOGY | Facility: CLINIC | Age: 70
End: 2020-02-21

## 2020-02-21 DIAGNOSIS — G43.719 INTRACTABLE CHRONIC MIGRAINE WITHOUT AURA AND WITHOUT STATUS MIGRAINOSUS: Primary | ICD-10-CM

## 2020-02-21 NOTE — TELEPHONE ENCOUNTER
----- Message from Jalyn Olivia sent at 2/21/2020  9:49 AM CST -----  Contact: Pt  Reason: Pt calling to reschedule appt for today.    Communication: [phone]

## 2020-02-26 ENCOUNTER — TELEPHONE (OUTPATIENT)
Dept: PHARMACY | Facility: CLINIC | Age: 70
End: 2020-02-26

## 2020-02-27 ENCOUNTER — OUTPATIENT CASE MANAGEMENT (OUTPATIENT)
Dept: ADMINISTRATIVE | Facility: OTHER | Age: 70
End: 2020-02-27

## 2020-03-02 ENCOUNTER — PATIENT MESSAGE (OUTPATIENT)
Dept: UROLOGY | Facility: CLINIC | Age: 70
End: 2020-03-02

## 2020-03-02 ENCOUNTER — PATIENT MESSAGE (OUTPATIENT)
Dept: PHYSICAL MEDICINE AND REHAB | Facility: CLINIC | Age: 70
End: 2020-03-02

## 2020-03-02 ENCOUNTER — PATIENT MESSAGE (OUTPATIENT)
Dept: NEUROLOGY | Facility: CLINIC | Age: 70
End: 2020-03-02

## 2020-03-02 ENCOUNTER — PATIENT MESSAGE (OUTPATIENT)
Dept: INTERNAL MEDICINE | Facility: CLINIC | Age: 70
End: 2020-03-02

## 2020-03-02 DIAGNOSIS — G89.29 CHRONIC MIDLINE LOW BACK PAIN WITHOUT SCIATICA: ICD-10-CM

## 2020-03-02 DIAGNOSIS — G89.29 CHRONIC NECK PAIN: ICD-10-CM

## 2020-03-02 DIAGNOSIS — M54.50 CHRONIC MIDLINE LOW BACK PAIN WITHOUT SCIATICA: ICD-10-CM

## 2020-03-02 DIAGNOSIS — M54.2 CHRONIC NECK PAIN: ICD-10-CM

## 2020-03-02 DIAGNOSIS — M79.7 FIBROMYALGIA: ICD-10-CM

## 2020-03-02 RX ORDER — HYDROCODONE BITARTRATE AND ACETAMINOPHEN 10; 325 MG/1; MG/1
1 TABLET ORAL EVERY 6 HOURS PRN
Qty: 120 TABLET | Refills: 0 | Status: SHIPPED | OUTPATIENT
Start: 2020-03-04 | End: 2020-04-02 | Stop reason: SDUPTHER

## 2020-03-04 ENCOUNTER — PATIENT MESSAGE (OUTPATIENT)
Dept: NEPHROLOGY | Facility: CLINIC | Age: 70
End: 2020-03-04

## 2020-03-04 ENCOUNTER — OUTPATIENT CASE MANAGEMENT (OUTPATIENT)
Dept: ADMINISTRATIVE | Facility: OTHER | Age: 70
End: 2020-03-04

## 2020-03-04 ENCOUNTER — PATIENT MESSAGE (OUTPATIENT)
Dept: INTERNAL MEDICINE | Facility: CLINIC | Age: 70
End: 2020-03-04

## 2020-03-04 NOTE — PROGRESS NOTES
Outpatient Care Management  Plan of Care Follow Up Visit    Patient: Cecile Bowen  MRN: [id number]  Date of Service: 03/04/2020  Completed by: Ezequiel Cornejo RN  Referral Date: 09/25/2019  Program: Disease Management (Diabetes & COPD)    Reason for Visit   Patient presents with    Case Closure     3/4/2020       Brief Summary: Contacted patient for F/U.  Patient reported that she had to reschedule her appt with Dr. López today due to weather concerns and a migraine. Patient reported that she did contact Dr. Lozoya about rescheduling her appt for Botox and has an appt scheduled in May. Patient would like to see if she can get an appt sooner. Will message Dr. Lozoya's team to see if patient can be placed on a waiting list as she is experiencing Migraines almost daily. Inquired about BG monitoring. Patient continues to check her BG as directed, stated that this morning her BG was 259. Patient stated that her her BG has been up and down. Continues to grieve the loss of her brother, but has closure since his recent services. Patient has been provided education on recommended treatment and management of Diabetes. Understands the importance of checking BG readings, attending appts with Endo, Podiatry, optometry/opthalmology and dental for assistance with disease management and prevent further complications. Patient appreciative of assistance. Will close case at this time as teaching needs have been met. Encouraged to contact with needs. Verbalized understanding.     Next Steps:   NA-Case closed    Patient Summary     Involvement of Care:  Do I have permission to speak with other family members about your care?       Patient Reported Labs & Vitals:  1.  Any Patient Reported Labs & Vitals?     2.  Patient Reported Blood Pressure:     3.  Patient Reported Pulse:     4.  Patient Reported Weight (Kg):     5.  Patient Reported Blood Glucose (mg/dl):   259    Medical History:  Reviewed medical history with patient and/or  caregiver    Social History:  Reviewed social history with patient and/or caregiver    Clinical Assessment     Reviewed and provided basic information on available community resources for mental health, transportation, wellness resources, and palliative care programs with patient and/or caregiver.    Complex Care Plan     Care plan was discussed and completed today with input from patient and/or caregiver.      Patient Instructions     Instructions were provided via the [employer] patient resources and are available for the patient to view on the patient portal.        Todays OPCM Self-Management Care Plan was developed with the patients/caregivers input and was based on identified barriers from todays assessment.  Goals were written today with the patient/caregiver and the patient has agreed to work towards these goals to improve his/her overall well-being. Patient verbalized understanding of the care plan, goals, and all of today's instructions. Encouraged patient/caregiver to communicate with his/her physician and health care team about health conditions and the treatment plan.  Provided my contact information today and encouraged patient/caregiver to call me with any questions as needed.

## 2020-03-04 NOTE — TELEPHONE ENCOUNTER
----- Message from Tom Negrete sent at 3/4/2020  9:24 AM CST -----  Contact: pt  Type:  Sooner Apoointment Request    Caller is requesting a sooner appointment.  Caller accepted first available appointment listed below.  Caller accepted being placed on the waitlist and is requesting a message be sent to doctor.    Name of Caller:  pt  When is the first available appointment?  pts appt  Symptoms:  regular 3-month check-up   Best Call Back Number:  [phone]  Additional Information:  Have not seen in 6 b/c dr unavailable for last visit.

## 2020-03-11 ENCOUNTER — OFFICE VISIT (OUTPATIENT)
Dept: UROLOGY | Facility: CLINIC | Age: 70
End: 2020-03-11
Payer: MEDICARE

## 2020-03-11 VITALS — DIASTOLIC BLOOD PRESSURE: 84 MMHG | HEART RATE: 84 BPM | SYSTOLIC BLOOD PRESSURE: 159 MMHG

## 2020-03-11 DIAGNOSIS — Z93.59 CHRONIC SUPRAPUBIC CATHETER: ICD-10-CM

## 2020-03-11 DIAGNOSIS — R82.71 BACTERIA IN URINE: ICD-10-CM

## 2020-03-11 DIAGNOSIS — N31.9 NEUROGENIC BLADDER: ICD-10-CM

## 2020-03-11 DIAGNOSIS — N32.89 BLADDER SPASMS: ICD-10-CM

## 2020-03-11 DIAGNOSIS — Z43.5 ENCOUNTER FOR CARE OR REPLACEMENT OF SUPRAPUBIC TUBE: Primary | ICD-10-CM

## 2020-03-11 PROCEDURE — 51705 CHANGE OF BLADDER TUBE: CPT | Mod: S$PBB,,, | Performed by: NURSE PRACTITIONER

## 2020-03-11 PROCEDURE — 99214 OFFICE O/P EST MOD 30 MIN: CPT | Mod: S$PBB,25,, | Performed by: NURSE PRACTITIONER

## 2020-03-11 PROCEDURE — 51705 PR CHANGE OF BLADDER TUBE,SIMPLE: ICD-10-PCS | Mod: S$PBB,,, | Performed by: NURSE PRACTITIONER

## 2020-03-11 PROCEDURE — 87088 URINE BACTERIA CULTURE: CPT

## 2020-03-11 PROCEDURE — 87077 CULTURE AEROBIC IDENTIFY: CPT

## 2020-03-11 PROCEDURE — 99214 PR OFFICE/OUTPT VISIT, EST, LEVL IV, 30-39 MIN: ICD-10-PCS | Mod: S$PBB,25,, | Performed by: NURSE PRACTITIONER

## 2020-03-11 PROCEDURE — 51705 CHANGE OF BLADDER TUBE: CPT | Mod: PBBFAC | Performed by: NURSE PRACTITIONER

## 2020-03-11 PROCEDURE — 99999 PR PBB SHADOW E&M-EST. PATIENT-LVL V: CPT | Mod: PBBFAC,,, | Performed by: NURSE PRACTITIONER

## 2020-03-11 PROCEDURE — 87186 SC STD MICRODIL/AGAR DIL: CPT | Mod: 59

## 2020-03-11 PROCEDURE — 99999 PR PBB SHADOW E&M-EST. PATIENT-LVL V: ICD-10-PCS | Mod: PBBFAC,,, | Performed by: NURSE PRACTITIONER

## 2020-03-11 PROCEDURE — 99215 OFFICE O/P EST HI 40 MIN: CPT | Mod: PBBFAC | Performed by: NURSE PRACTITIONER

## 2020-03-11 PROCEDURE — 87086 URINE CULTURE/COLONY COUNT: CPT

## 2020-03-11 NOTE — PROGRESS NOTES
Subjective:       Patient ID: Cecile Bowen is a 69 y.o. female.    Chief Complaint: Follow-up (SPT change)    Cecile Bowen is a 69 y.o. Diabetic Female with history of bilateral hydronephrosis, incomplete bladder emptying which is managed by SPT (16fr).  S/p Cystoscopy with bladder botox injection (300u) 2018 with Dr. Whittington.    She has been diagnosed with breast cancer; s/p Mastectomy 2019  Malignant neoplasm of upper-outer quadrant of left breast in female, estrogen receptor positive   No need for chemo/radiation therapy.    Her last SPT change was 2020.    She is late with her exchange /2 death in the family.  Her brother who lived with her  in her home.  She found him. He was cremated and ashes distrubed at [place] in .      Here today for SPT change; she would also like to be rescheduled for her Botox with Dr. Whittington.    Bladder spasms present;   No fever, n/v                      Past Medical History:    Diabetes type 2, uncontrolled                   2012    Obesity                                         2012    Hypertension                                    2012    DJD (degenerative joint disease)                2012    Hypothyroidism                                  2012    Nuclear sclerosis - Both Eyes                   3/24/2014     Migraine                                                      Past Surgical History:    TOTAL ABDOMINAL HYSTERECTOMY W/ BILATERAL SALP*            GALLBLADDER SURGERY                              2006          TONSILLECTOMY, ADENOIDECTOMY                                   OVARIAN CYST SURGERY                                       HYSTERECTOMY                                                   DILATION AND CURETTAGE OF UTERUS                               Review of patient's family history indicates:    Diabetes                       Sister                    Kidney disease                  "Sister                    ALS                            Mother                      Comment: d.    Cancer                         Maternal Grandmother        Comment: d. colon    Cancer                         Paternal Grandfather        Comment: d. lung    Diabetes                       Maternal Aunt             Kidney disease                 Maternal Aunt             Diabetes                       Maternal Uncle            Amblyopia                      Neg Hx                    Blindness                      Neg Hx                    Cataracts                      Neg Hx                    Glaucoma                       Neg Hx                    Macular degeneration           Neg Hx                    Retinal detachment             Neg Hx                    Strabismus                     Neg Hx                      Social History    Marital Status:             Spouse Name:                       Years of Education:                 Number of children:               Occupational History    None on file    Social History Main Topics    Smoking Status: Never Smoker                      Smokeless Status: Never Used                        Alcohol Use: No              Drug Use: No              Sexual Activity: Not Currently           Birth Control/Protection: Abstinence    Other Topics            Concern    None on file    Social History Narrative    Single      Lives w/ sister  And brother     both are helping her during her post hosp recovery period        Allergies:  Review of patient's allergies indicates no known allergies.    Medications:  Current outpatient prescriptions:amitriptyline (ELAVIL) 10 MG tablet, TAKE 3 TABLETS BY MOUTH IN THE EVENING, Disp: 90 tablet, Rfl: 5;  aspirin (ECOTRIN) 81 MG EC tablet, Take 81 mg by mouth once daily., Disp: , Rfl: ;  BD INSULIN PEN NEEDLE UF SHORT 31 X 5/16 " Ndle, USE ONCE DAILY WITH LANTUS SOLOSTAR PEN INSULIN, Disp: 100 each, Rfl: " "11  butalbital-acetaminophen-caffeine -40 mg (FIORICET, ESGIC) -40 mg per tablet, TAKE 1 TABLET EVERY 4 TO 6 HOURS, Disp: 30 tablet, Rfl: 0;  cyanocobalamin, vitamin B-12, (VITAMIN B-12) 2,500 mcg Subl, Place 2,500 mcg under the tongue once daily., Disp: , Rfl: ;  diphenhydrAMINE (BENADRYL) 25 mg capsule, Take 25 mg by mouth every 6 (six) hours as needed., Disp: , Rfl:   ferrous sulfate 325 (65 FE) MG EC tablet, Take 325 mg by mouth 3 (three) times daily with meals., Disp: , Rfl: ;  fluticasone (FLONASE) 50 mcg/actuation nasal spray, 1 SPRAY IN EACH NOSTRIL DAILY (Patient taking differently: 1 SPRAY IN EACH NOSTRIL DAILY PRN), Disp: 16 g, Rfl: 1;  furosemide (LASIX) 20 MG tablet, Take 1 tablet (20 mg total) by mouth once daily., Disp: 4 tablet, Rfl: 0  gemfibrozil (LOPID) 600 MG tablet, TAKE 1 TABLET BY MOUTH TWICE A DAY, Disp: 60 tablet, Rfl: 5;  (START ON 4/29/2015) hydrocodone-acetaminophen 10-325mg (NORCO)  mg Tab, Take 1 tablet by mouth every 6 (six) hours as needed., Disp: 120 tablet, Rfl: 0;  insulin lispro (HUMALOG) 100 unit/mL injection, Inject 7 Units into the skin 3 (three) times daily before meals., Disp: 6.3 mL, Rfl: 11  insulin syringe-needle U-100 (BD INSULIN SYRINGE ULTRA-FINE) 1/2 mL 31 x 15/64" Syrg, 1 Box by Misc.(Non-Drug; Combo Route) route 4 (four) times daily., Disp: 100 Syringe, Rfl: 12;  lancets (ONE TOUCH DELICA LANCETS) Misc, Ultra 2 lancets to check blood sugar BID, Disp: 100 each, Rfl: 6;  LANTUS SOLOSTAR 100 unit/mL (3 mL) InPn pen, , Disp: , Rfl: 3  LIDODERM 5 %(700 mg/patch), APPLY 1 PATCH TO SKIN DAILY (LEAVING ON FOR 12 HOURS AND OFF FOR 12 HOURS), Disp: 30 patch, Rfl: 6;  magnesium oxide (MAG-OX) 400 mg tablet, TAKE 1 TABLET (400 MG TOTAL) BY MOUTH 2 (TWO) TIMES DAILY., Disp: 60 tablet, Rfl: 11;  methocarbamol (ROBAXIN) 750 MG Tab, TAKE 1 TO 2 TABLETS BY MOUTH 3 TIMES A DAY (Patient taking differently: Take 2 tablets twice daily), Disp: 120 tablet, Rfl: " 3  methocarbamol (ROBAXIN) 750 MG Tab, TAKE 1 TO 2 TABLETS BY MOUTH 3 TIMES A DAY, Disp: 120 tablet, Rfl: 3;  methylPREDNISolone (MEDROL DOSEPACK) 4 mg tablet, use as directed, Disp: 21 tablet, Rfl: 0;  multivitamin with minerals tablet, Take 1 tablet by mouth once daily., Disp: , Rfl: ;  pravastatin (PRAVACHOL) 40 MG tablet, TAKE 1 TABLET BY MOUTH EVERY DAY, Disp: 30 tablet, Rfl: 2  pyridoxine (B-6) 100 MG Tab, Take 1 tablet (100 mg total) by mouth once daily., Disp: 30 tablet, Rfl: 12;  scopolamine (TRANSDERM-SCOP) 1.5 mg, Place 1 patch (1.5 mg total) onto the skin every 72 hours., Disp: 4 patch, Rfl: 0;  sulfamethoxazole-trimethoprim 800-160mg (BACTRIM DS) 800-160 mg Tab, Take 1 tablet by mouth 2 (two) times daily., Disp: , Rfl: 0  sumatriptan (IMITREX) 100 MG tablet, TAKE 1 TABLET (100 MG TOTAL) BY MOUTH ONCE., Disp: 9 tablet, Rfl: 6;  SYNTHROID 125 mcg tablet, TAKE 1 TABLET BY MOUTH DAILY, Disp: 90 tablet, Rfl: 4;  topiramate (TOPAMAX) 100 MG tablet, TAKE 1 TABLET (100 MG TOTAL) BY MOUTH 2 (TWO) TIMES DAILY., Disp: 60 tablet, Rfl: 11;  topiramate (TOPAMAX) 25 MG tablet, Take 4 tablets (100 mg total) by mouth 2 (two) times daily., Disp: 240 tablet, Rfl: 5  venlafaxine (EFFEXOR-XR) 150 MG Cp24, TAKE 1 CAPSULE BY MOUTH ONCE DAILY, Disp: 30 capsule, Rfl: 2;  vitamin D (VITAMIN D3) 185 MG Tab, Take 185 mg by mouth once daily., Disp: , Rfl:         Review of Systems   Constitutional: Negative.  Negative for chills and fever.   HENT: Negative for facial swelling and trouble swallowing.    Eyes: Negative for visual disturbance.   Respiratory: Negative for cough, chest tightness, shortness of breath and wheezing.    Cardiovascular: Negative for chest pain and palpitations.   Gastrointestinal: Negative for abdominal pain, constipation, nausea and vomiting.   Genitourinary: Positive for difficulty urinating and urgency. Negative for dysuria, hematuria and vaginal bleeding.        SPT draining well.   (+) bladder spasms.       Musculoskeletal: Positive for arthralgias, gait problem and myalgias.   Skin: Negative for rash.   Neurological: Negative for dizziness and headaches.   Hematological: Does not bruise/bleed easily.   Psychiatric/Behavioral: Negative for behavioral problems.       Objective:      Physical Exam   Nursing note and vitals reviewed.  Constitutional: She is oriented to person, place, and time. She appears well-developed and well-nourished.   HENT:   Head: Normocephalic.   Right Ear: External ear normal.   Left Ear: External ear normal.   Nose: Nose normal.   Eyes: Conjunctivae and lids are normal. Right eye exhibits no discharge. Left eye exhibits no discharge. No scleral icterus.   Neck: Trachea normal and normal range of motion. Neck supple. No tracheal deviation present.   Cardiovascular: Normal rate and intact distal pulses.    Pulmonary/Chest: Effort normal. No respiratory distress.   Abdominal: Soft. She exhibits no distension. There is no tenderness. There is no guarding.       Musculoskeletal: Normal range of motion. She exhibits no edema.   Neurological: She is alert and oriented to person, place, and time.   Skin: Skin is warm, dry and intact.     Psychiatric: She has a normal mood and affect. Her behavior is normal.       Assessment:       1. Encounter for care or replacement of suprapubic tube    2. Bacteria in urine    3. Bladder spasms    4. Chronic suprapubic catheter    5. Neurogenic bladder        Plan:          I spent 30 minutes with the patient of which more than half was spent in direct consultation with the patient in regards to our treatment and plan.    Education and recommendations of today's plan of care including home remedies.  Old SPT easily removed.   Stoma prepped with betadine.   New 16fr SPT placed using sterile technique  Urine received and sent to lab for culture; treat prior to cysto with botox (no date yet).  Irrigated the bladder to verify the position and plug to distal  end  Balloon inflated 9cc sterile water.  Tolerated well  Skin barrier cream to be mailed.  dsg applied  Skin barrier cream given  Surgery request for Botox completed.

## 2020-03-12 ENCOUNTER — TELEPHONE (OUTPATIENT)
Dept: UROLOGY | Facility: CLINIC | Age: 70
End: 2020-03-12

## 2020-03-12 NOTE — TELEPHONE ENCOUNTER
Tried to contact pt for scheduling, no answer left vm on 3-11 at 4:02pm asking to return the call.

## 2020-03-13 LAB — BACTERIA UR CULT: ABNORMAL

## 2020-03-15 ENCOUNTER — PATIENT MESSAGE (OUTPATIENT)
Dept: NEUROSURGERY | Facility: CLINIC | Age: 70
End: 2020-03-15

## 2020-03-16 ENCOUNTER — PROCEDURE VISIT (OUTPATIENT)
Dept: NEUROLOGY | Facility: CLINIC | Age: 70
End: 2020-03-16
Payer: MEDICARE

## 2020-03-16 VITALS — BODY MASS INDEX: 44.41 KG/M2 | WEIGHT: 293 LBS | HEIGHT: 68 IN

## 2020-03-16 DIAGNOSIS — G43.809 CERVICOGENIC MIGRAINE: Primary | ICD-10-CM

## 2020-03-16 DIAGNOSIS — G43.909 MIXED MIGRAINE AND MUSCLE CONTRACTION HEADACHE: ICD-10-CM

## 2020-03-16 DIAGNOSIS — R51.9 CHRONIC DAILY HEADACHE: ICD-10-CM

## 2020-03-16 DIAGNOSIS — G44.209 MIXED MIGRAINE AND MUSCLE CONTRACTION HEADACHE: ICD-10-CM

## 2020-03-16 PROCEDURE — 64615 PR CHEMODENERVATION OF MUSCLE FOR CHRONIC MIGRAINE: ICD-10-PCS | Mod: S$PBB,,, | Performed by: PSYCHIATRY & NEUROLOGY

## 2020-03-16 PROCEDURE — 64615 CHEMODENERV MUSC MIGRAINE: CPT | Mod: PBBFAC | Performed by: PSYCHIATRY & NEUROLOGY

## 2020-03-16 PROCEDURE — 99499 NO LOS: ICD-10-PCS | Mod: S$PBB,,, | Performed by: PSYCHIATRY & NEUROLOGY

## 2020-03-16 PROCEDURE — 64615 CHEMODENERV MUSC MIGRAINE: CPT | Mod: S$PBB,,, | Performed by: PSYCHIATRY & NEUROLOGY

## 2020-03-16 PROCEDURE — 99499 UNLISTED E&M SERVICE: CPT | Mod: S$PBB,,, | Performed by: PSYCHIATRY & NEUROLOGY

## 2020-03-16 RX ORDER — PROPRANOLOL HYDROCHLORIDE 80 MG/1
80 CAPSULE, EXTENDED RELEASE ORAL DAILY
Qty: 90 CAPSULE | Refills: 3 | Status: ON HOLD | OUTPATIENT
Start: 2020-03-16 | End: 2021-12-29 | Stop reason: HOSPADM

## 2020-03-16 NOTE — PROCEDURES
Procedures   [facility] - NEUROLOGY  [facility], [place]    Date: March 16, 2020   Patient Name: Cecile Bowen   MRN: [id number]   PCP: Kailey Navarro  Referring Provider: Enrique Henao MD    Assessment:      This is Cecile Bowen, 69 y.o. female with chronic migraines (G43.719) and suffers from headaches more than 15 days a month lasting more than 4 hours a day with no relief of symptoms despite trying multiple medications listed below. Botox treatment was approved for chronic migraines in October 2010.  We are planning for 3 treatments 3 months apart and aiming for at least 50% improvement in the symptoms. If we see no improvement after 3 treatments, we will discontinue the injections. .     Plan:      1, prophylactic medication - topamax 200 mg BID, Effexor 225mg/d, Mg, B2 supplementation, increase Propranolol 60->80mg/d - consider wean if no improvement given uncontrolled DM  2, Breakthrough headache - Imitrex, tylenol, ubrelvy  3. Continue botox       I discussed side effects of the medications. I asked the patient to  stop the medication if She notices serious adverse effects as we discussed and to seek immediate medical attention at an ER.     Enrique Henao MD  [facility]   Department of Neurology    Subjective:     Follow up :   -  Continues to note some benefit of aimovig with ongoing daily headache  -  Recent stressor of finding brother dead in their home    1/2020  Improved severity with same near daily frequency of HA with aimovig, benefit of imitrex and robaxin but not flexeril, some improvement in HA with recent improvement in BP   12/2017  Continues with headaches most days but are markedly less severe, may go 1-2 weeks without excedrin, continued relief from imitrex.  Had a pleasant [place] with family.   8/2017  No benefit from GBP, only 2 HA free days per month but feels generally less severe - takes excedrin migraine 1-2 tabs  daily  5/2017  Continued daily headache, stopped fioricet, continued relief from imitrex, unable to comply with CPAP due to severe distress caused by objects around her face related to childhood molesation     HPI:   Ms. Cecile Bowen is a 69 y.o. female who presents with a chief complaint of headache    Headache history:   Age of onset - Teens   Location - Bioccipital goes to crown   Nature of pain - Throbbing   Prodrome - no   Aura - No   Duration of headache - hrs   Time to peak intensity - 1 hr   Pain scale - range of intensity - 7-8/10   Frequency - 4/week , now 5/month   Status Migrainosus history - yes   Time of day of most headaches- anytime   Associated symptoms with the headache:   Meningeal symptoms - photophobia, phonophobia, exercise intolerance +   Nausea/vomitting +   Nasal drainage   Visual blurriness   Pallor/flushing   Dizziness +   Vertigo   Confusion   Impaired concentration +   Pain worsened with physical activity +   Neck pain +   Tension HA:   Location - Bifrontal   Nature of pain - Gripping   Prodrome - no   Aura - No   Duration of headache - hrs   Time to peak intensity - 1 hr   Pain scale - range of intensity - 6/10   Frequency - daily   Time of day of most headaches- morning   Associated symptoms with the headache: none   Headache Triggers: Heat (hot weather, hot baths or showers) , emotional stress +   Weather change +   Other comorbid conditions:   Anxiety - yes   Motion sickness symptom - yes   Treatment history:   Resolution of headache with sleep - yes   Meds tried:   Fioricet - helps   Imitrex - helps   No headache benefit from:  Elavil, effexor, namenda, Mg, robaxin, flexeril, norco, unable to take NSAIDs due to renal failure  topamax - helped with migraine      Headache risk factors:   H/o TBI - no   H/o Meningitis - no   F/h Aneurysms - no   Takes naps during the day   Denies refreshing sleep   Snoring +     PAST MEDICAL HISTORY:  Past Medical History:   Diagnosis Date     Cervicogenic migraine     Chronic pain     CKD (chronic kidney disease) stage 4, GFR 15-29 ml/min     [facility]    CKD (chronic kidney disease) stage 4, GFR 15-29 ml/min     Diabetes mellitus     Long term use of Insulin, Diabetic Neuropathy    Fibromyalgia     Hydronephrosis     Hyperlipidemia     Hypertension 12/12/2012    Hypothyroidism 12/12/2012    ARON (iron deficiency anemia)     Insomnia     Levoscoliosis     Malignant neoplasm of upper-outer quadrant of left breast in female, estrogen receptor positive     Metabolic acidosis     Mobility impaired     Nuclear sclerosis - Both Eyes 3/24/2014    VIJAYA (obstructive sleep apnea)     Osteopenia     Pulmonary nodule     Recurrent UTI     Renal manifestation of secondary diabetes mellitus     Secondary hyperparathyroidism, renal     Urinary retention        PAST SURGICAL HISTORY:  Past Surgical History:   Procedure Laterality Date    BREAST BIOPSY Right     benign    CHOLECYSTECTOMY      COLONOSCOPY N/A 1/13/2017    Procedure: COLONOSCOPY;  Surgeon: Morris Wiseman MD;  [facility]  Service: Endoscopy;  Laterality: N/A;  Renal pt Nephrology advised to avoid phosphate preps    CYSTOSCOPY N/A 10/8/2018    Procedure: CYSTOSCOPY;  Surgeon: Ronel Whittington MD;  Location: [facility] OR [address] [place] [address];  Service: Urology;  Laterality: N/A;  45 min    CYSTOSCOPY N/A 3/25/2019    Procedure: CYSTOSCOPY;  Surgeon: Ronel Whittington MD;  Location: [facility] OR [address] [place] [address];  Service: Urology;  Laterality: N/A;  45 min    CYSTOSCOPY N/A 8/26/2019    Procedure: CYSTOSCOPY;  Surgeon: Ronel Whittington MD;  Location: [facility] OR [address] [place] [address];  Service: Urology;  Laterality: N/A;  45 min    CYSTOSCOPY WITH BIOPSY OF BLADDER N/A 1/27/2020    Procedure: CYSTOSCOPY, WITH BLADDER BIOPSY, WITH FULGURATION IF INDICATED;  Surgeon: Ronel Whittington MD;  Location: [facility] OR [address] [place] [address];  Service: Urology;  Laterality: N/A;    DILATION AND CURETTAGE OF  "UTERUS      GALLBLADDER SURGERY  2006    HYSTERECTOMY      INJECTION FOR SENTINEL NODE IDENTIFICATION Left 8/1/2019    Procedure: INJECTION, FOR SENTINEL NODE IDENTIFICATION;  Surgeon: Samia Fulton MD;  Location: [facility] OR [address] [place] [address];  Service: General;  Laterality: Left;    INJECTION OF BOTULINUM TOXIN TYPE A N/A 10/8/2018    Procedure: INJECTION, BOTULINUM TOXIN, TYPE A 300 UNITS;  Surgeon: Ronel Whittington MD;  [facility];  Service: Urology;  Laterality: N/A;    INJECTION OF BOTULINUM TOXIN TYPE A N/A 3/25/2019    Procedure: INJECTION, BOTULINUM TOXIN, TYPE A 300 UNITS;  Surgeon: Ronel Whittington MD;  [facility];  Service: Urology;  Laterality: N/A;    INJECTION OF BOTULINUM TOXIN TYPE A N/A 8/26/2019    Procedure: INJECTION, BOTULINUM TOXIN, TYPE A 300 UNITS;  Surgeon: Ronel Whittington MD;  [facility];  Service: Urology;  Laterality: N/A;    MASTECTOMY Left 8/1/2019    Procedure: MASTECTOMY 23 hour stay;  Surgeon: Samia Fulton MD;  Location: [facility] OR [address] [place] [address];  Service: General;  Laterality: Left;    OVARIAN CYST SURGERY  1985    SENTINEL LYMPH NODE BIOPSY Left 8/1/2019    Procedure: BIOPSY, LYMPH NODE, SENTINEL;  Surgeon: Samia Fulton MD;  Location: [facility] OR [address] [place] [address];  Service: General;  Laterality: Left;    spt placement      TONSILLECTOMY, ADENOIDECTOMY      TOTAL ABDOMINAL HYSTERECTOMY W/ BILATERAL SALPINGOOPHORECTOMY  1985       CURRENT MEDS:  Current Outpatient Medications   Medication Sig Dispense Refill    amLODIPine (NORVASC) 10 MG tablet Take 1 tablet (10 mg total) by mouth once daily. For hypertension 90 tablet 1    anastrozole (ARIMIDEX) 1 mg Tab Take 1 tablet (1 mg total) by mouth once daily. 90 tablet 2    BD INSULIN SYRINGE ULTRA-FINE 0.5 mL 31 gauge x 5/16" Syrg USE WITH INSULIN 4 TIMES A  each 12    blood sugar diagnostic Strp ONE TOUCH ULTRA TEST STRIPS//Check blood sugars  strip 6    " butalbital-acetaminophen-caffeine -40 mg (FIORICET, ESGIC) -40 mg per tablet TAKE 1 TABLET BY MOUTH WHEN NOT CONTROLLED BY OTHER MEDS. NO MORE THAN 10 TABS PER 30 DAYS 10 tablet 0    cloNIDine (CATAPRES) 0.1 MG tablet TAKE 1 TABLET BY MOUTH EVERY 8 HOURS AS NEEDED FOR SYSTOLIC BLOOD PRESSURE GREATER THAN 170 90 tablet 1    cyanocobalamin, vitamin B-12, (VITAMIN B-12) 5,000 mcg Subl Place 1 tablet under the tongue daily as needed.       diphenhydrAMINE (BENADRYL) 50 MG capsule Take 25 mg by mouth daily as needed for Itching or Allergies.      erenumab-aooe 140 mg/mL AtIn Inject 1 syringe (140 mg total) into the skin every 28 days. (Patient taking differently: Inject 140 mg into the skin every 28 days. Takes at the end of every month) 1 mL 11    ergocalciferol (ERGOCALCIFEROL) 50,000 unit Cap Take 1 capsule (50,000 Units total) by mouth every 7 days. TAKE ONE CAPSULE BY MOUTH ONE TIME PER WEEK, Takes on Tuesdays 12 capsule 3    ferrous sulfate 325 (65 FE) MG EC tablet Take 325 mg by mouth nightly.       gemfibrozil (LOPID) 600 MG tablet Take 1 tablet (600 mg total) by mouth 3 (three) times a week. 48 tablet 0    HYDROcodone-acetaminophen (NORCO)  mg per tablet Take 1 tablet by mouth every 6 (six) hours as needed. 120 tablet 0    insulin (LANTUS SOLOSTAR U-100 INSULIN) glargine 100 units/mL (3mL) SubQ pen INJECT 32 UNITS SUBCUTANEOUSLY DAILY. 15 mL 6    insulin lispro (HUMALOG U-100 INSULIN) 100 unit/mL injection INJECT 12 UNITS W/ MEALS PLUS SCALE 180-230+2, 231-280+4, 281-330+6, 331-380+8, >380 +10. 30 mL 4    lancets Misc Ultra 2 lancets to check blood sugar  each 6    magnesium oxide (MAG-OX) 400 mg (241.3 mg magnesium) tablet TAKE 1 TABLET (400 MG TOTAL) BY MOUTH 2 (TWO) TIMES DAILY. 180 tablet 3    methocarbamol (ROBAXIN) 750 MG Tab Take 1-2 tablets (750-1,500 mg total) by mouth 3 (three) times daily as needed. 180 tablet 3    multivitamin with minerals tablet Take 1 tablet  "by mouth every morning.       oxybutynin (DITROPAN-XL) 10 MG 24 hr tablet Take 10 mg by mouth once daily.  3    pen needle, diabetic (BD ULTRA-FINE SHORT PEN NEEDLE) 31 gauge x 5/16" Ndle USES W/ LANTUS DAILY. 90 DAY e 11.65 100 each 3    riboflavin, vitamin B2, 400 mg Tab Take 400 mg by mouth once daily. (Patient taking differently: Take 400 mg by mouth every morning. ) 90 tablet 3    rosuvastatin (CRESTOR) 20 MG tablet TAKE 1 TABLET BY MOUTH EVERY DAY 90 tablet 1    scopolamine (TRANSDERM-SCOP) 1.3-1.5 mg (1 mg over 3 days) Place 1 patch onto the skin every 72 hours. 4 patch 0    sodium bicarbonate 650 MG tablet TAKE 1 TABLET (650 MG TOTAL) BY MOUTH 3 (THREE) TIMES DAILY. 270 tablet 1    sumatriptan (IMITREX) 100 MG tablet TAKE 1 TABLET (100 MG TOTAL) BY MOUTH 2 (TWO) TIMES DAILY AS NEEDED FOR MIGRAINE. 9 tablet 11    SYNTHROID 50 mcg tablet TAKE 1 TABLET BY MOUTH EVERY DAY 90 tablet 2    topiramate (TOPAMAX) 200 MG Tab TAKE 1 TABLET BY MOUTH TWICE A  tablet 3    traZODone (DESYREL) 100 MG tablet TAKE 1 TABLET BY MOUTH AT BEDTIME MAY TAKE AN EXTRA HALF TABLET IF NEEDED 135 tablet 3    venlafaxine (EFFEXOR-XR) 75 MG 24 hr capsule Take 3 capsules (225 mg total) by mouth once daily.      blood-glucose meter kit ONE TOUCH ULTRA 1 each 0    diclofenac sodium (VOLTAREN) 1 % Gel Apply 2 g topically 3 (three) times daily. 3 Tube 2    propranoloL (INDERAL LA) 80 MG 24 hr capsule Take 1 capsule (80 mg total) by mouth once daily. 90 capsule 3    ubrogepant 100 mg Tab Take 100 mg by mouth daily as needed. 10 tablet 11     Current Facility-Administered Medications   Medication Dose Route Frequency Provider Last Rate Last Dose    onabotulinumtoxina injection 200 Units  200 Units Intramuscular Q90 Days Enrique Henao MD           ALLERGIES:  Review of patient's allergies indicates:  No Known Allergies    FAMILY HISTORY:  Family History   Problem Relation Age of Onset    Diabetes Sister     Kidney " "disease Sister         CKD III    ALS Mother         d.    Cancer Maternal Grandmother         d. colon    Cancer Paternal Grandfather         d. lung    Scoliosis Brother         increased pain    Prostate cancer Brother         cured s/p surgery    Cancer Brother         remission     Diabetes Maternal Aunt     Kidney disease Maternal Aunt     Diabetes Maternal Uncle     Amblyopia Neg Hx     Blindness Neg Hx     Cataracts Neg Hx     Glaucoma Neg Hx     Macular degeneration Neg Hx     Retinal detachment Neg Hx     Strabismus Neg Hx        SOCIAL HISTORY:  Social History     Tobacco Use    Smoking status: Never Smoker    Smokeless tobacco: Never Used   Substance Use Topics    Alcohol use: No     Alcohol/week: 0.0 standard drinks    Drug use: No       Review of Systems:  12 review of systems is negative except for the symptoms mentioned in HPI.        Objective:     Vitals:    03/16/20 1035   Weight: 132.9 kg (293 lb)   Height: 5' 8" (1.727 m)       General: NAD, well nourished   Eyes: no tearing, discharge, no erythema   ENT: moist mucous membranes of the oral cavity, nares patent    Neck: Supple, full range of motion  Cardiovascular: Warm and well perfused, pulses equal and symmetrical  Lungs: Normal work of breathing, normal chest wall excursions  Skin: No rash, lesions, or breakdown on exposed skin  Psychiatry: Mood and affect are appropriate   Abdomen: soft, non tender, non distended  Extremeties: No cyanosis, clubbing or edema.    Neurological   MENTAL STATUS: Alert and oriented to person, place, and time. Attention and concentration within normal limits. Speech without dysarthria, able to name and repeat without difficulty. Recent and remote memory within normal limits   CRANIAL NERVES: Visual fields intact. PERRL. EOMI. Facial sensation intact. Face symmetrical. Hearing grossly intact. Full shoulder shrug bilaterally. Tongue protrudes midline   SENSORY: Sensation is intact to light touch " throughout.    MOTOR: Normal bulk and tone. No pronator drift.   CEREBELLAR/COORDINATION/GAIT: FTN intact    BOTOX was performed as an indicated therapy for intractable chronic migraine headaches given that the patient failed several prophylactic medications    Botulinum Toxin Injection Procedure   Pre-operative diagnosis: Chronic migraine   Post-operative diagnosis: Same   Procedure: Chemical neurolysis   After risks and benefits were explained including bleeding, infection, worsening of pain, damage to the areas being injected, weakness of muscles, loss of muscle control, dysphagia if injecting the head or neck, facial droop if injecting the facial area, painful injection, allergic or other reaction to the medications being injected, and the failure of the procedure to help the problem, a signed consent was obtained.   The patient was placed in a comfortable area and the sites to be treated were identified.The area to be treated was prepped three times with alcohol and the alcohol allowed to dry. Next, a 30 gauge needle was used to inject the medication in the area to be treated.   Area(s) injected:   Total Botox used: 155 Units   Botox wastage: 45 Units   Injection sites:    muscle bilaterally ( a total of 10 units divided into 2 sites)   Procerus muscle (5 units)   Frontalis muscle bilaterally (a total of 20 units divided into 4 sites)   Temporalis muscle bilaterally (a total of 40 units divided into 8 sites)   Occipitalis muscle bilaterally (a total of 30 units divided into 6 sites)   Cervical paraspinal muscles (a total of 20 units divided into 4 sites)   Trapezius muscle bilaterally (a total of 30 units divided into 6 sites)   Complications: none   RTC for the next Botox injection: 3 months

## 2020-03-17 DIAGNOSIS — M79.7 FIBROMYALGIA: ICD-10-CM

## 2020-03-17 DIAGNOSIS — M54.50 CHRONIC MIDLINE LOW BACK PAIN WITHOUT SCIATICA: ICD-10-CM

## 2020-03-17 DIAGNOSIS — G89.29 CHRONIC NECK PAIN: ICD-10-CM

## 2020-03-17 DIAGNOSIS — M54.2 CHRONIC NECK PAIN: ICD-10-CM

## 2020-03-17 DIAGNOSIS — G89.29 CHRONIC MIDLINE LOW BACK PAIN WITHOUT SCIATICA: ICD-10-CM

## 2020-03-17 RX ORDER — VENLAFAXINE HYDROCHLORIDE 75 MG/1
CAPSULE, EXTENDED RELEASE ORAL
Qty: 180 CAPSULE | Refills: 3 | Status: ON HOLD | OUTPATIENT
Start: 2020-03-17 | End: 2021-07-06 | Stop reason: HOSPADM

## 2020-03-18 ENCOUNTER — PATIENT MESSAGE (OUTPATIENT)
Dept: PHYSICAL MEDICINE AND REHAB | Facility: CLINIC | Age: 70
End: 2020-03-18

## 2020-03-18 ENCOUNTER — PATIENT MESSAGE (OUTPATIENT)
Dept: NEPHROLOGY | Facility: CLINIC | Age: 70
End: 2020-03-18

## 2020-03-18 ENCOUNTER — TELEPHONE (OUTPATIENT)
Dept: NEPHROLOGY | Facility: CLINIC | Age: 70
End: 2020-03-18

## 2020-03-18 DIAGNOSIS — M54.2 CHRONIC NECK PAIN: ICD-10-CM

## 2020-03-18 DIAGNOSIS — G89.29 CHRONIC NECK PAIN: ICD-10-CM

## 2020-03-18 DIAGNOSIS — G89.29 CHRONIC MIDLINE LOW BACK PAIN WITHOUT SCIATICA: ICD-10-CM

## 2020-03-18 DIAGNOSIS — M54.50 CHRONIC MIDLINE LOW BACK PAIN WITHOUT SCIATICA: ICD-10-CM

## 2020-03-18 DIAGNOSIS — M79.7 FIBROMYALGIA: ICD-10-CM

## 2020-03-18 RX ORDER — METHOCARBAMOL 750 MG/1
750-1500 TABLET, FILM COATED ORAL 3 TIMES DAILY PRN
Qty: 180 TABLET | Refills: 3 | Status: SHIPPED | OUTPATIENT
Start: 2020-04-03 | End: 2020-07-15

## 2020-03-18 NOTE — TELEPHONE ENCOUNTER
Pt. Will wait until May to see Dr. López. She is not interested in seeing Doctor in a virtual appt. At this point.

## 2020-03-22 RX ORDER — ROSUVASTATIN CALCIUM 20 MG/1
TABLET, COATED ORAL
Qty: 90 TABLET | Refills: 3 | Status: ON HOLD | OUTPATIENT
Start: 2020-03-22 | End: 2021-12-29 | Stop reason: HOSPADM

## 2020-03-23 DIAGNOSIS — Z93.59 CHRONIC SUPRAPUBIC CATHETER: ICD-10-CM

## 2020-03-23 DIAGNOSIS — E11.59 HYPERTENSION ASSOCIATED WITH DIABETES: ICD-10-CM

## 2020-03-23 DIAGNOSIS — N31.9 NEUROGENIC BLADDER: ICD-10-CM

## 2020-03-23 DIAGNOSIS — I15.2 HYPERTENSION ASSOCIATED WITH DIABETES: ICD-10-CM

## 2020-03-23 DIAGNOSIS — Z43.5 ENCOUNTER FOR CARE OR REPLACEMENT OF SUPRAPUBIC TUBE: Primary | ICD-10-CM

## 2020-03-23 NOTE — PROGRESS NOTES
Patient of Dr. Whittington with Neurogenic Bladder.  She manages her neurogenic bladder with a 16fr SPT.  Her last exchange in the office was 03/11/2020.  Dr. Whittington would like Home Health to change her SPT to prevent her from coming into the hospital clinic during this Covid-19 risks and precautions.    She is high risks, diabetic; breast cancer.    Orders placed by me today;  She scheduled to come to clinic on 04/08/2020

## 2020-03-25 ENCOUNTER — TELEPHONE (OUTPATIENT)
Dept: PHARMACY | Facility: CLINIC | Age: 70
End: 2020-03-25

## 2020-03-25 ENCOUNTER — TELEPHONE (OUTPATIENT)
Dept: INTERNAL MEDICINE | Facility: CLINIC | Age: 70
End: 2020-03-25

## 2020-03-25 NOTE — TELEPHONE ENCOUNTER
Spoke to pt. Pt was unable to locate the tawana to download My Chart.  Pt stated that she did not mind coming in to see Dr. Cancino to f/u.  Pt scheduled to see Dr. Cancino on Wednesday, 4/1/20 at 3:40 PM.

## 2020-03-25 NOTE — TELEPHONE ENCOUNTER
----- Message from Roxann Prince sent at 3/25/2020 10:24 AM CDT -----  Contact: Self   Pt was not able to locate the tawana. Please call and advise.

## 2020-04-01 ENCOUNTER — LAB VISIT (OUTPATIENT)
Dept: LAB | Facility: HOSPITAL | Age: 70
End: 2020-04-01
Attending: INTERNAL MEDICINE
Payer: MEDICARE

## 2020-04-01 ENCOUNTER — OFFICE VISIT (OUTPATIENT)
Dept: INTERNAL MEDICINE | Facility: CLINIC | Age: 70
End: 2020-04-01
Payer: MEDICARE

## 2020-04-01 VITALS
BODY MASS INDEX: 43.8 KG/M2 | HEART RATE: 84 BPM | HEIGHT: 68 IN | WEIGHT: 289 LBS | DIASTOLIC BLOOD PRESSURE: 75 MMHG | RESPIRATION RATE: 18 BRPM | TEMPERATURE: 98 F | SYSTOLIC BLOOD PRESSURE: 145 MMHG

## 2020-04-01 DIAGNOSIS — E11.22 CKD STAGE 4 DUE TO TYPE 2 DIABETES MELLITUS: ICD-10-CM

## 2020-04-01 DIAGNOSIS — C50.912 MALIGNANT NEOPLASM OF LEFT BREAST IN FEMALE, ESTROGEN RECEPTOR POSITIVE, UNSPECIFIED SITE OF BREAST: ICD-10-CM

## 2020-04-01 DIAGNOSIS — E11.59 HYPERTENSION ASSOCIATED WITH DIABETES: ICD-10-CM

## 2020-04-01 DIAGNOSIS — R51.9 CHRONIC DAILY HEADACHE: ICD-10-CM

## 2020-04-01 DIAGNOSIS — Z79.4 TYPE 2 DIABETES MELLITUS WITH DIABETIC NEUROPATHY, WITH LONG-TERM CURRENT USE OF INSULIN: Primary | ICD-10-CM

## 2020-04-01 DIAGNOSIS — N18.4 CKD STAGE 4 DUE TO TYPE 2 DIABETES MELLITUS: ICD-10-CM

## 2020-04-01 DIAGNOSIS — Z79.4 TYPE 2 DIABETES MELLITUS WITH DIABETIC NEUROPATHY, WITH LONG-TERM CURRENT USE OF INSULIN: ICD-10-CM

## 2020-04-01 DIAGNOSIS — N31.9 NEUROGENIC BLADDER: ICD-10-CM

## 2020-04-01 DIAGNOSIS — F33.1 MAJOR DEPRESSIVE DISORDER, RECURRENT EPISODE, MODERATE: ICD-10-CM

## 2020-04-01 DIAGNOSIS — Z93.59 CHRONIC SUPRAPUBIC CATHETER: ICD-10-CM

## 2020-04-01 DIAGNOSIS — E78.5 HYPERLIPIDEMIA ASSOCIATED WITH TYPE 2 DIABETES MELLITUS: ICD-10-CM

## 2020-04-01 DIAGNOSIS — E11.40 TYPE 2 DIABETES MELLITUS WITH DIABETIC NEUROPATHY, WITH LONG-TERM CURRENT USE OF INSULIN: Primary | ICD-10-CM

## 2020-04-01 DIAGNOSIS — Z17.0 MALIGNANT NEOPLASM OF LEFT BREAST IN FEMALE, ESTROGEN RECEPTOR POSITIVE, UNSPECIFIED SITE OF BREAST: ICD-10-CM

## 2020-04-01 DIAGNOSIS — E03.9 HYPOTHYROIDISM, UNSPECIFIED TYPE: ICD-10-CM

## 2020-04-01 DIAGNOSIS — E11.69 HYPERLIPIDEMIA ASSOCIATED WITH TYPE 2 DIABETES MELLITUS: ICD-10-CM

## 2020-04-01 DIAGNOSIS — E11.40 TYPE 2 DIABETES MELLITUS WITH DIABETIC NEUROPATHY, WITH LONG-TERM CURRENT USE OF INSULIN: ICD-10-CM

## 2020-04-01 DIAGNOSIS — I15.2 HYPERTENSION ASSOCIATED WITH DIABETES: ICD-10-CM

## 2020-04-01 DIAGNOSIS — E66.01 MORBID OBESITY DUE TO EXCESS CALORIES: ICD-10-CM

## 2020-04-01 LAB
ALBUMIN SERPL BCP-MCNC: 2.9 G/DL (ref 3.5–5.2)
ALP SERPL-CCNC: 119 U/L (ref 55–135)
ALT SERPL W/O P-5'-P-CCNC: 14 U/L (ref 10–44)
ANION GAP SERPL CALC-SCNC: 9 MMOL/L (ref 8–16)
AST SERPL-CCNC: 11 U/L (ref 10–40)
BILIRUB SERPL-MCNC: 0.2 MG/DL (ref 0.1–1)
BUN SERPL-MCNC: 34 MG/DL (ref 8–23)
CALCIUM SERPL-MCNC: 9.1 MG/DL (ref 8.7–10.5)
CHLORIDE SERPL-SCNC: 103 MMOL/L (ref 95–110)
CO2 SERPL-SCNC: 23 MMOL/L (ref 23–29)
CREAT SERPL-MCNC: 2.7 MG/DL (ref 0.5–1.4)
EST. GFR  (AFRICAN AMERICAN): 20.3 ML/MIN/1.73 M^2
EST. GFR  (NON AFRICAN AMERICAN): 17.6 ML/MIN/1.73 M^2
ESTIMATED AVG GLUCOSE: 200 MG/DL (ref 68–131)
GLUCOSE SERPL-MCNC: 242 MG/DL (ref 70–110)
HBA1C MFR BLD HPLC: 8.6 % (ref 4–5.6)
POTASSIUM SERPL-SCNC: 4.4 MMOL/L (ref 3.5–5.1)
PROT SERPL-MCNC: 7.3 G/DL (ref 6–8.4)
SODIUM SERPL-SCNC: 135 MMOL/L (ref 136–145)

## 2020-04-01 PROCEDURE — 80053 COMPREHEN METABOLIC PANEL: CPT

## 2020-04-01 PROCEDURE — 99215 OFFICE O/P EST HI 40 MIN: CPT | Mod: PBBFAC,PO | Performed by: STUDENT IN AN ORGANIZED HEALTH CARE EDUCATION/TRAINING PROGRAM

## 2020-04-01 PROCEDURE — 99214 OFFICE O/P EST MOD 30 MIN: CPT | Mod: S$PBB,GC,, | Performed by: STUDENT IN AN ORGANIZED HEALTH CARE EDUCATION/TRAINING PROGRAM

## 2020-04-01 PROCEDURE — 99999 PR PBB SHADOW E&M-EST. PATIENT-LVL V: ICD-10-PCS | Mod: PBBFAC,GC,, | Performed by: STUDENT IN AN ORGANIZED HEALTH CARE EDUCATION/TRAINING PROGRAM

## 2020-04-01 PROCEDURE — 99214 PR OFFICE/OUTPT VISIT, EST, LEVL IV, 30-39 MIN: ICD-10-PCS | Mod: S$PBB,GC,, | Performed by: STUDENT IN AN ORGANIZED HEALTH CARE EDUCATION/TRAINING PROGRAM

## 2020-04-01 PROCEDURE — 83036 HEMOGLOBIN GLYCOSYLATED A1C: CPT

## 2020-04-01 PROCEDURE — 36415 COLL VENOUS BLD VENIPUNCTURE: CPT | Mod: PO

## 2020-04-01 PROCEDURE — 99999 PR PBB SHADOW E&M-EST. PATIENT-LVL V: CPT | Mod: PBBFAC,GC,, | Performed by: STUDENT IN AN ORGANIZED HEALTH CARE EDUCATION/TRAINING PROGRAM

## 2020-04-01 RX ORDER — BUTALBITAL, ACETAMINOPHEN AND CAFFEINE 50; 325; 40 MG/1; MG/1; MG/1
TABLET ORAL
Qty: 10 TABLET | Refills: 0 | Status: SHIPPED | OUTPATIENT
Start: 2020-04-01 | End: 2020-05-29

## 2020-04-01 NOTE — PROGRESS NOTES
I have interviewed and examined the patient w/ the resident,   I agree w/ the impression and plan as outlined above.   Pt to be seen in Urology in ~ 2 weeks request she advise  If BP still elevated  ++ stress w/ recent loss of her brother  Lurdes Cancino MD- Staff

## 2020-04-01 NOTE — PROGRESS NOTES
Internal Medicine Clinic Note  4/1/20      Subjective:       Patient ID: Cecile Bowen is a 69 y.o. female being seen for an established visit.    Chief Complaint: Follow-up      HPI  Patient is a 69 year old female with multiple co-morbidities including IDDM (HA1c 8.9) with complications of CKD and neuropathy, hypothyroidism, HTN, HLD, chronic pain, breast cancer, and recurrent UTI who presents to clinic for follow up of her multiple co-morbidities. She reports that she has no particular health concerns on this clinic visit. She reports continued chronic symptoms of back and knee pain as well as daily migraines and sinus issues. She reports that she has been getting botox injections with neurology to help control her migraine headaches. She reports that her blood pressures usually run 130's/80's at home which is an improvement from 170's-190's systolic in the past. She reports trying to comply with diet and light exercise to help control her diabetes; currently takes 12 short acting insulin TID and 32 units lantus nightly. Of note, patient has breast cancer s/p mastectomy last August; currently being treated with anastrozole. She reports compliance with her medications with no particular adverse effects reported.  At the time of my exam, patient denies any dizziness, lightheadedness, chest pain, SOB, cough, fevers, or chills. She denies any issues with her suprapubic catheter.     Past Medical History:   Diagnosis Date    Cervicogenic migraine     Chronic pain     CKD (chronic kidney disease) stage 4, GFR 15-29 ml/min     [facility]    CKD (chronic kidney disease) stage 4, GFR 15-29 ml/min     Diabetes mellitus     Long term use of Insulin, Diabetic Neuropathy    Fibromyalgia     Hydronephrosis     Hyperlipidemia     Hypertension 12/12/2012    Hypothyroidism 12/12/2012    ARON (iron deficiency anemia)     Insomnia     Levoscoliosis     Malignant neoplasm of upper-outer quadrant of left breast in  female, estrogen receptor positive     Metabolic acidosis     Mobility impaired     Nuclear sclerosis - Both Eyes 3/24/2014    VIJAYA (obstructive sleep apnea)     Osteopenia     Pulmonary nodule     Recurrent UTI     Renal manifestation of secondary diabetes mellitus     Secondary hyperparathyroidism, renal     Urinary retention        Past Surgical History:   Procedure Laterality Date    BREAST BIOPSY Right     benign    CHOLECYSTECTOMY      COLONOSCOPY N/A 1/13/2017    Procedure: COLONOSCOPY;  Surgeon: Morris Wiseman MD;  Location: [facility] (4TH FLR);  Service: Endoscopy;  Laterality: N/A;  Renal pt Nephrology advised to avoid phosphate preps    CYSTOSCOPY N/A 10/8/2018    Procedure: CYSTOSCOPY;  Surgeon: Ronel Whittington MD;  Location: [facility] OR [address] [place] [address];  Service: Urology;  Laterality: N/A;  45 min    CYSTOSCOPY N/A 3/25/2019    Procedure: CYSTOSCOPY;  Surgeon: Ronel Whittington MD;  Location: [facility] OR [address] [place] [address];  Service: Urology;  Laterality: N/A;  45 min    CYSTOSCOPY N/A 8/26/2019    Procedure: CYSTOSCOPY;  Surgeon: Ronel Whittington MD;  Location: [address];  Service: Urology;  Laterality: N/A;  45 min    CYSTOSCOPY WITH BIOPSY OF BLADDER N/A 1/27/2020    Procedure: CYSTOSCOPY, WITH BLADDER BIOPSY, WITH FULGURATION IF INDICATED;  Surgeon: Ronel Whittington MD;  Location: [address];  Service: Urology;  Laterality: N/A;    DILATION AND CURETTAGE OF UTERUS      GALLBLADDER SURGERY  2006    HYSTERECTOMY      INJECTION FOR SENTINEL NODE IDENTIFICATION Left 8/1/2019    Procedure: INJECTION, FOR SENTINEL NODE IDENTIFICATION;  Surgeon: Samia Fulton MD;  Location: [facility] 2ND FLR;  Service: General;  Laterality: Left;    INJECTION OF BOTULINUM TOXIN TYPE A N/A 10/8/2018    Procedure: INJECTION, BOTULINUM TOXIN, TYPE A 300 UNITS;  Surgeon: Ronel Whittington MD;  Location: [facility] OR [address] [place] [address];  Service: Urology;  Laterality: N/A;    INJECTION OF BOTULINUM TOXIN  TYPE A N/A 3/25/2019    Procedure: INJECTION, BOTULINUM TOXIN, TYPE A 300 UNITS;  Surgeon: Ronel Whittington MD;  Location: [facility] OR [address] [place] [address];  Service: Urology;  Laterality: N/A;    INJECTION OF BOTULINUM TOXIN TYPE A N/A 8/26/2019    Procedure: INJECTION, BOTULINUM TOXIN, TYPE A 300 UNITS;  Surgeon: Ronel Whittington MD;  [facility];  Service: Urology;  Laterality: N/A;    MASTECTOMY Left 8/1/2019    Procedure: MASTECTOMY 23 hour stay;  Surgeon: Samia Fulton MD;  Location: [facility] OR 2ND FLR;  Service: General;  Laterality: Left;    OVARIAN CYST SURGERY  1985    SENTINEL LYMPH NODE BIOPSY Left 8/1/2019    Procedure: BIOPSY, LYMPH NODE, SENTINEL;  Surgeon: Samia Fulton MD;  Location: [facility] OR [address] [place] [address];  Service: General;  Laterality: Left;    spt placement      TONSILLECTOMY, ADENOIDECTOMY      TOTAL ABDOMINAL HYSTERECTOMY W/ BILATERAL SALPINGOOPHORECTOMY  1985       Family History   Problem Relation Age of Onset    Diabetes Sister     Kidney disease Sister         CKD III    ALS Mother         d.    Cancer Maternal Grandmother         d. colon    Cancer Paternal Grandfather         d. lung    Scoliosis Brother         increased pain    Prostate cancer Brother         cured s/p surgery    Cancer Brother         remission     Diabetes Maternal Aunt     Kidney disease Maternal Aunt     Diabetes Maternal Uncle     Amblyopia Neg Hx     Blindness Neg Hx     Cataracts Neg Hx     Glaucoma Neg Hx     Macular degeneration Neg Hx     Retinal detachment Neg Hx     Strabismus Neg Hx        Social History     Socioeconomic History    Marital status:      Spouse name: Not on file    Number of children: Not on file    Years of education: Not on file    Highest education level: Not on file   Occupational History    Occupation: teacher     Comment: retired   Social Needs    Financial resource strain: Not on file    Food insecurity:     Worry: Not on file     Inability:  Not on file    Transportation needs:     Medical: Not on file     Non-medical: Not on file   Tobacco Use    Smoking status: Never Smoker    Smokeless tobacco: Never Used   Substance and Sexual Activity    Alcohol use: No     Alcohol/week: 0.0 standard drinks    Drug use: No    Sexual activity: Not Currently     Birth control/protection: Abstinence   Lifestyle    Physical activity:     Days per week: Not on file     Minutes per session: Not on file    Stress: Not on file   Relationships    Social connections:     Talks on phone: Not on file     Gets together: Not on file     Attends Jewish service: Not on file     Active member of club or organization: Not on file     Attends meetings of clubs or organizations: Not on file     Relationship status: Not on file   Other Topics Concern    Not on file   Social History Narrative    Single ()        Lives w/ sister and brother. Pt needs to keep her narcotics hidden from her brother who will steal them            Review of Systems   Constitutional: Negative for appetite change, chills, fatigue and fever.   HENT: Positive for sinus pressure. Negative for congestion and sore throat.    Eyes: Negative for visual disturbance.   Respiratory: Negative for cough, chest tightness and shortness of breath.    Cardiovascular: Negative for chest pain, palpitations and leg swelling.   Gastrointestinal: Positive for constipation (occasional ) and diarrhea (occasional). Negative for abdominal pain and nausea.   Genitourinary: Negative for dysuria and flank pain.   Musculoskeletal: Positive for arthralgias and back pain. Negative for myalgias.   Skin: Negative for color change, pallor and rash.   Neurological: Positive for headaches (daily migraine). Negative for dizziness and weakness.       Patient's Medications   New Prescriptions    No medications on file   Previous Medications    AMLODIPINE (NORVASC) 10 MG TABLET    Take 1 tablet (10 mg total) by mouth once daily.  "For hypertension    ANASTROZOLE (ARIMIDEX) 1 MG TAB    Take 1 tablet (1 mg total) by mouth once daily.    BD INSULIN SYRINGE ULTRA-FINE 0.5 ML 31 GAUGE X 5/16" SYRG    USE WITH INSULIN 4 TIMES A DAY    BLOOD SUGAR DIAGNOSTIC STRP    ONE TOUCH ULTRA TEST STRIPS//Check blood sugars QID    BLOOD-GLUCOSE METER KIT    ONE TOUCH ULTRA    CLONIDINE (CATAPRES) 0.1 MG TABLET    TAKE 1 TABLET BY MOUTH EVERY 8 HOURS AS NEEDED FOR SYSTOLIC BLOOD PRESSURE GREATER THAN 170    CYANOCOBALAMIN, VITAMIN B-12, (VITAMIN B-12) 5,000 MCG SUBL    Place 1 tablet under the tongue daily as needed.     DICLOFENAC SODIUM (VOLTAREN) 1 % GEL    Apply 2 g topically 3 (three) times daily.    DIPHENHYDRAMINE (BENADRYL) 50 MG CAPSULE    Take 25 mg by mouth daily as needed for Itching or Allergies.    ERENUMAB-AOOE 140 MG/ML ATIN    Inject 1 syringe (140 mg total) into the skin every 28 days.    ERGOCALCIFEROL (ERGOCALCIFEROL) 50,000 UNIT CAP    Take 1 capsule (50,000 Units total) by mouth every 7 days. TAKE ONE CAPSULE BY MOUTH ONE TIME PER WEEK, Takes on Tuesdays    FERROUS SULFATE 325 (65 FE) MG EC TABLET    Take 325 mg by mouth nightly.     GEMFIBROZIL (LOPID) 600 MG TABLET    Take 1 tablet (600 mg total) by mouth 3 (three) times a week.    HYDROCODONE-ACETAMINOPHEN (NORCO)  MG PER TABLET    Take 1 tablet by mouth every 6 (six) hours as needed.    INSULIN (LANTUS SOLOSTAR U-100 INSULIN) GLARGINE 100 UNITS/ML (3ML) SUBQ PEN    INJECT 32 UNITS SUBCUTANEOUSLY DAILY.    INSULIN LISPRO (HUMALOG U-100 INSULIN) 100 UNIT/ML INJECTION    INJECT 12 UNITS W/ MEALS PLUS SCALE 180-230+2, 231-280+4, 281-330+6, 331-380+8, >380 +10.    LANCETS MISC    Ultra 2 lancets to check blood sugar BID    MAGNESIUM OXIDE (MAG-OX) 400 MG (241.3 MG MAGNESIUM) TABLET    TAKE 1 TABLET (400 MG TOTAL) BY MOUTH 2 (TWO) TIMES DAILY.    METHOCARBAMOL (ROBAXIN) 750 MG TAB    Take 1-2 tablets (750-1,500 mg total) by mouth 3 (three) times daily as needed.    MULTIVITAMIN WITH " "MINERALS TABLET    Take 1 tablet by mouth every morning.     OXYBUTYNIN (DITROPAN-XL) 10 MG 24 HR TABLET    Take 10 mg by mouth once daily.    PEN NEEDLE, DIABETIC (BD ULTRA-FINE SHORT PEN NEEDLE) 31 GAUGE X 5/16" NDLE    USES W/ LANTUS DAILY. 90 DAY e 11.65    PROPRANOLOL (INDERAL LA) 80 MG 24 HR CAPSULE    Take 1 capsule (80 mg total) by mouth once daily.    RIBOFLAVIN, VITAMIN B2, 400 MG TAB    Take 400 mg by mouth once daily.    ROSUVASTATIN (CRESTOR) 20 MG TABLET    TAKE 1 TABLET BY MOUTH EVERY DAY    SCOPOLAMINE (TRANSDERM-SCOP) 1.3-1.5 MG (1 MG OVER 3 DAYS)    Place 1 patch onto the skin every 72 hours.    SODIUM BICARBONATE 650 MG TABLET    TAKE 1 TABLET (650 MG TOTAL) BY MOUTH 3 (THREE) TIMES DAILY.    SUMATRIPTAN (IMITREX) 100 MG TABLET    TAKE 1 TABLET (100 MG TOTAL) BY MOUTH 2 (TWO) TIMES DAILY AS NEEDED FOR MIGRAINE.    SYNTHROID 50 MCG TABLET    TAKE 1 TABLET BY MOUTH EVERY DAY    TOPIRAMATE (TOPAMAX) 200 MG TAB    TAKE 1 TABLET BY MOUTH TWICE A DAY    TRAZODONE (DESYREL) 100 MG TABLET    TAKE 1 TABLET BY MOUTH AT BEDTIME MAY TAKE AN EXTRA HALF TABLET IF NEEDED    UBROGEPANT 100 MG TAB    Take 100 mg by mouth daily as needed.    VENLAFAXINE (EFFEXOR-XR) 75 MG 24 HR CAPSULE    TAKE 2 CAPSULES (150 MG TOTAL) BY MOUTH ONCE DAILY.   Modified Medications    Modified Medication Previous Medication    BUTALBITAL-ACETAMINOPHEN-CAFFEINE -40 MG (FIORICET, ESGIC) -40 MG PER TABLET butalbital-acetaminophen-caffeine -40 mg (FIORICET, ESGIC) -40 mg per tablet       TAKE 1 TABLET BY MOUTH WHEN NOT CONTROLLED BY OTHER MEDS. NO MORE THAN 10 TABS PER 30 DAYS    TAKE 1 TABLET BY MOUTH WHEN NOT CONTROLLED BY OTHER MEDS. NO MORE THAN 10 TABS PER 30 DAYS   Discontinued Medications    No medications on file       Patient Active Problem List    Diagnosis Date Noted    Type 2 diabetes mellitus with diabetic neuropathy 02/09/2020    Diabetes mellitus type 2 in obese 02/07/2020    Prophylactic use of " anastrozole (Arimidex) 01/31/2020    Facet arthritis of lumbar region 09/25/2019    Obesity, diabetes, and hypertension syndrome 09/25/2019    Requires daily assistance for activities of daily living (ADL) and comfort needs 09/25/2019    Presence of urostomy 09/25/2019    S/P left mastectomy 09/25/2019    Risk for falls 09/24/2019    Malignant neoplasm of left breast, estrogen receptor positive 08/01/2019    Cervicogenic migraine 02/01/2019    CKD stage 4 due to type 2 diabetes mellitus 01/20/2019     [facility]      Hydronephrosis 05/14/2018    Chronic pain 03/15/2018    Lumbar spondylosis 02/20/2018    Long term prescription opiate use 01/26/2017    Chronic daily headache 01/20/2017    Osteopenia 01/09/2017    Morbid obesity due to excess calories 01/09/2017    Pulmonary nodule 11/10/2016    Type 2 diabetes mellitus with proteinuria 09/19/2016    Chronic suprapubic catheter 09/19/2016    Uncontrolled type 2 diabetes mellitus with chronic kidney disease, with long-term current use of insulin 07/06/2016    Secondary hyperparathyroidism, renal 03/22/2016    Major depressive disorder, recurrent episode, moderate 03/10/2016    Insomnia 03/10/2016    Mixed migraine and muscle contraction headache 03/10/2016    Neurogenic bladder 12/14/2015    Urinary retention 11/10/2015    Primary osteoarthritis involving multiple joints 09/15/2015    Iron deficiency anemia 03/16/2015    Sideroblastic anemia 03/12/2015    Osteoarthritis of lumbar spine 02/20/2015    Abnormality of gait and mobility 10/29/2014    VIJAYA (obstructive sleep apnea) 03/17/2014    Vitamin D deficiency disease 03/11/2014    Hyperlipidemia associated with type 2 diabetes mellitus 03/11/2014    Intractable chronic migraine without aura 08/27/2013    Fibromyalgia 12/17/2012    Hypoglycemic reaction to insulin in type 2 diabetes mellitus 12/12/2012    Hypertension associated with diabetes 12/12/2012    Hypothyroidism  "12/12/2012           Objective:      BP (!) 145/75 (BP Location: Right arm)   Pulse 84   Temp 97.8 °F (36.6 °C) (Oral)   Resp 18   Ht 5' 8" (1.727 m)   Wt 131.1 kg (289 lb 0.4 oz)   BMI 43.95 kg/m²   Estimated body mass index is 43.95 kg/m² as calculated from the following:    Height as of this encounter: 5' 8" (1.727 m).    Weight as of this encounter: 131.1 kg (289 lb 0.4 oz).    Physical Exam   Constitutional: She is oriented to person, place, and time. She appears well-developed and well-nourished. No distress.   obese   HENT:   Head: Normocephalic and atraumatic.   Mouth/Throat: No oropharyngeal exudate.   Eyes: Pupils are equal, round, and reactive to light. EOM are normal. No scleral icterus.   Neck: Normal range of motion. Neck supple. No JVD present.   Cardiovascular: Normal rate, regular rhythm, normal heart sounds and intact distal pulses.   Pulmonary/Chest: Effort normal and breath sounds normal. She has no wheezes. She has no rales.   Abdominal: Soft. Bowel sounds are normal. There is no tenderness.   Musculoskeletal: Normal range of motion. She exhibits no edema or deformity.   Lymphadenopathy:     She has no cervical adenopathy.   Neurological: She is alert and oriented to person, place, and time. A sensory deficit (lower extremities) is present.   Skin: Skin is warm and dry.   Psychiatric: She has a normal mood and affect. Her behavior is normal.   Vitals reviewed.      Assessment:         1. Type 2 diabetes mellitus with diabetic neuropathy, with long-term current use of insulin  Comprehensive metabolic panel    Hemoglobin A1c   2. Hypertension associated with diabetes     3. Hyperlipidemia associated with type 2 diabetes mellitus     4. CKD stage 4 due to type 2 diabetes mellitus  Comprehensive metabolic panel   5. Morbid obesity due to excess calories     6. Hypothyroidism, unspecified type     7. Malignant neoplasm of left breast in female, estrogen receptor positive, unspecified site of " breast     8. Neurogenic bladder     9. Chronic suprapubic catheter     10. Chronic daily headache     11. Major depressive disorder, recurrent episode, moderate           Plan:         1. Type 2 diabetes mellitus with diabetic neuropathy, with long-term current use of insulin  - Last HA1c 8.9 two months ago  - Continue home lantus (32 units nightly), and lispro (12 units TID), managed by endocrinology  - Comprehensive metabolic panel; Future  - Hemoglobin A1c; Future    2. Hypertension associated with diabetes  - BP elevated on current clinic visit at 145 systolic  - Patient has various BP recorded on different clinic visit, some more controlled than others. Reports more controlled BP of 130/80 at home  - Continue home amlodipine and clonidine at current dose for now, will adjust if BP remains elevated on subsequent visits     3. Hyperlipidemia associated with type 2 diabetes mellitus  - Continue home rosuvastatin and gemfibrozil    4. CKD stage 4 due to type 2 diabetes mellitus  - Comprehensive metabolic panel; Future  - followed by nephrology    5. Morbid obesity due to excess calories  - Advised continued diet and exercise  - Continue diabetic and HLD medications    6. Hypothyroidism, unspecified type  - Continue home synthroid     7. Malignant neoplasm of left breast in female, estrogen receptor positive, unspecified site of breast  - Followed by oncology  - Continue anastrozole     8. Neurogenic bladder  - s/p suprapubic catheter placement  - Continue oxybutynin  - stable     9. Chronic suprapubic catheter  - See neurogenic bladder    10. Chronic daily headache  - Followed by neurology  - Refilled prescription for Fioricet  - Continue home fioricet, sumatriptan      11. Major depressive disorder, recurrent episode, moderate  - Continue home venlafaxine      Orders Placed This Encounter   Procedures    Comprehensive metabolic panel     Standing Status:   Future     Standing Expiration Date:   5/31/2021     Hemoglobin A1c     Standing Status:   Future     Standing Expiration Date:   5/31/2021             Patient seen and plan of care discussed with Dr. Julita Jaime  Internal Medicine, PGY-2  [phone]

## 2020-04-02 ENCOUNTER — PATIENT MESSAGE (OUTPATIENT)
Dept: PHYSICAL MEDICINE AND REHAB | Facility: CLINIC | Age: 70
End: 2020-04-02

## 2020-04-02 ENCOUNTER — TELEPHONE (OUTPATIENT)
Dept: INTERNAL MEDICINE | Facility: CLINIC | Age: 70
End: 2020-04-02

## 2020-04-02 ENCOUNTER — PATIENT MESSAGE (OUTPATIENT)
Dept: UROLOGY | Facility: CLINIC | Age: 70
End: 2020-04-02

## 2020-04-02 DIAGNOSIS — M54.50 CHRONIC MIDLINE LOW BACK PAIN WITHOUT SCIATICA: ICD-10-CM

## 2020-04-02 DIAGNOSIS — M54.2 CHRONIC NECK PAIN: ICD-10-CM

## 2020-04-02 DIAGNOSIS — M79.7 FIBROMYALGIA: ICD-10-CM

## 2020-04-02 DIAGNOSIS — G89.29 CHRONIC MIDLINE LOW BACK PAIN WITHOUT SCIATICA: ICD-10-CM

## 2020-04-02 DIAGNOSIS — G89.29 CHRONIC NECK PAIN: ICD-10-CM

## 2020-04-02 RX ORDER — HYDROCODONE BITARTRATE AND ACETAMINOPHEN 10; 325 MG/1; MG/1
1 TABLET ORAL EVERY 6 HOURS PRN
Qty: 120 TABLET | Refills: 0 | Status: SHIPPED | OUTPATIENT
Start: 2020-04-03 | End: 2020-04-28 | Stop reason: SDUPTHER

## 2020-04-02 NOTE — TELEPHONE ENCOUNTER
Phoned patient on 4/2 at 2:47 pm to inform her of her lab results from her 4/1 appointment. Discussed that her HA1c remains stable; slightly lower than her previous lab 2 months ago 8.9 -> 8.6. However, her creatinine is elevated to 2.7 from 2.1. She reports adequate hydration. Advised continued close BP and blood glucose monitoring. Patient has follow up with nephrology in May for management of CKD.

## 2020-04-07 ENCOUNTER — TELEPHONE (OUTPATIENT)
Dept: NEPHROLOGY | Facility: CLINIC | Age: 70
End: 2020-04-07

## 2020-04-07 ENCOUNTER — PATIENT MESSAGE (OUTPATIENT)
Dept: HEMATOLOGY/ONCOLOGY | Facility: CLINIC | Age: 70
End: 2020-04-07

## 2020-04-07 DIAGNOSIS — D50.8 OTHER IRON DEFICIENCY ANEMIA: ICD-10-CM

## 2020-04-07 DIAGNOSIS — N18.4 CKD (CHRONIC KIDNEY DISEASE) STAGE 4, GFR 15-29 ML/MIN: Primary | ICD-10-CM

## 2020-04-07 NOTE — TELEPHONE ENCOUNTER
----- Message from Karen Pitts MA sent at 3/18/2020  2:18 PM CDT -----  Would you like labs drawn before this pt. Comes in ? She has a appt. In May- I do not see any lab orders or anything scheduled.  Thank you!!!

## 2020-04-08 PROCEDURE — G0180 MD CERTIFICATION HHA PATIENT: HCPCS | Mod: ,,, | Performed by: UROLOGY

## 2020-04-08 PROCEDURE — G0180 PR HOME HEALTH MD CERTIFICATION: ICD-10-PCS | Mod: ,,, | Performed by: UROLOGY

## 2020-04-08 NOTE — TELEPHONE ENCOUNTER
Scheduled lab work for before her apt on 5/1 in [place]. Pt has another apt that will work with her lab apt. She verbalized understanding.

## 2020-04-10 ENCOUNTER — PATIENT MESSAGE (OUTPATIENT)
Dept: INTERNAL MEDICINE | Facility: CLINIC | Age: 70
End: 2020-04-10

## 2020-04-17 ENCOUNTER — TELEPHONE (OUTPATIENT)
Dept: PHARMACY | Facility: CLINIC | Age: 70
End: 2020-04-17

## 2020-04-20 ENCOUNTER — PATIENT MESSAGE (OUTPATIENT)
Dept: INTERNAL MEDICINE | Facility: CLINIC | Age: 70
End: 2020-04-20

## 2020-04-20 ENCOUNTER — PATIENT MESSAGE (OUTPATIENT)
Dept: HEMATOLOGY/ONCOLOGY | Facility: CLINIC | Age: 70
End: 2020-04-20

## 2020-04-28 ENCOUNTER — PATIENT MESSAGE (OUTPATIENT)
Dept: PHYSICAL MEDICINE AND REHAB | Facility: CLINIC | Age: 70
End: 2020-04-28

## 2020-04-28 DIAGNOSIS — M79.7 FIBROMYALGIA: ICD-10-CM

## 2020-04-28 DIAGNOSIS — G89.29 CHRONIC MIDLINE LOW BACK PAIN WITHOUT SCIATICA: ICD-10-CM

## 2020-04-28 DIAGNOSIS — G89.29 CHRONIC NECK PAIN: ICD-10-CM

## 2020-04-28 DIAGNOSIS — M54.50 CHRONIC MIDLINE LOW BACK PAIN WITHOUT SCIATICA: ICD-10-CM

## 2020-04-28 DIAGNOSIS — M54.2 CHRONIC NECK PAIN: ICD-10-CM

## 2020-04-28 RX ORDER — HYDROCODONE BITARTRATE AND ACETAMINOPHEN 10; 325 MG/1; MG/1
1 TABLET ORAL EVERY 6 HOURS PRN
Qty: 120 TABLET | Refills: 0 | Status: SHIPPED | OUTPATIENT
Start: 2020-05-03 | End: 2020-06-02 | Stop reason: SDUPTHER

## 2020-04-29 ENCOUNTER — PATIENT MESSAGE (OUTPATIENT)
Dept: INTERNAL MEDICINE | Facility: CLINIC | Age: 70
End: 2020-04-29

## 2020-04-29 ENCOUNTER — PATIENT MESSAGE (OUTPATIENT)
Dept: UROLOGY | Facility: CLINIC | Age: 70
End: 2020-04-29

## 2020-04-29 ENCOUNTER — TELEPHONE (OUTPATIENT)
Dept: UROLOGY | Facility: CLINIC | Age: 70
End: 2020-04-29

## 2020-04-29 ENCOUNTER — TELEPHONE (OUTPATIENT)
Dept: UROLOGY | Facility: HOSPITAL | Age: 70
End: 2020-04-29

## 2020-04-29 ENCOUNTER — OFFICE VISIT (OUTPATIENT)
Dept: INTERNAL MEDICINE | Facility: CLINIC | Age: 70
End: 2020-04-29
Payer: MEDICARE

## 2020-04-29 DIAGNOSIS — N25.81 SECONDARY HYPERPARATHYROIDISM, RENAL: ICD-10-CM

## 2020-04-29 DIAGNOSIS — E11.649 HYPOGLYCEMIC REACTION TO INSULIN IN TYPE 2 DIABETES MELLITUS: ICD-10-CM

## 2020-04-29 DIAGNOSIS — R80.9 TYPE 2 DIABETES MELLITUS WITH PROTEINURIA: ICD-10-CM

## 2020-04-29 DIAGNOSIS — E11.29 TYPE 2 DIABETES MELLITUS WITH PROTEINURIA: ICD-10-CM

## 2020-04-29 DIAGNOSIS — Z79.4 TYPE 2 DIABETES MELLITUS WITH DIABETIC NEUROPATHY, WITH LONG-TERM CURRENT USE OF INSULIN: Primary | ICD-10-CM

## 2020-04-29 DIAGNOSIS — E03.9 HYPOTHYROIDISM, UNSPECIFIED TYPE: ICD-10-CM

## 2020-04-29 DIAGNOSIS — E11.40 TYPE 2 DIABETES MELLITUS WITH DIABETIC NEUROPATHY, WITH LONG-TERM CURRENT USE OF INSULIN: Primary | ICD-10-CM

## 2020-04-29 PROCEDURE — 99442 PR PHYSICIAN TELEPHONE EVALUATION 11-20 MIN: CPT | Mod: 95,,, | Performed by: NURSE PRACTITIONER

## 2020-04-29 PROCEDURE — 99442 PR PHYSICIAN TELEPHONE EVALUATION 11-20 MIN: ICD-10-PCS | Mod: 95,,, | Performed by: NURSE PRACTITIONER

## 2020-04-29 RX ORDER — INSULIN GLARGINE 100 [IU]/ML
INJECTION, SOLUTION SUBCUTANEOUS
Qty: 15 ML | Refills: 6
Start: 2020-04-29 | End: 2020-08-03

## 2020-04-29 RX ORDER — INSULIN LISPRO 100 [IU]/ML
INJECTION, SOLUTION INTRAVENOUS; SUBCUTANEOUS
Qty: 30 ML | Refills: 4
Start: 2020-04-29 | End: 2020-12-29 | Stop reason: SDUPTHER

## 2020-04-29 RX ORDER — AMPICILLIN 500 MG/1
500 CAPSULE ORAL 2 TIMES DAILY
Qty: 14 CAPSULE | Refills: 0 | Status: SHIPPED | OUTPATIENT
Start: 2020-04-29 | End: 2020-05-06

## 2020-04-29 RX ORDER — SULFAMETHOXAZOLE AND TRIMETHOPRIM 800; 160 MG/1; MG/1
1 TABLET ORAL 2 TIMES DAILY
Qty: 14 TABLET | Refills: 0 | Status: SHIPPED | OUTPATIENT
Start: 2020-04-29 | End: 2020-05-06

## 2020-04-29 NOTE — PATIENT INSTRUCTIONS
Snacks can be an important part of a balanced, healthy meal plan. They allow you to eat more frequently, feeling full and satisfied throughout the day. Also, they allow you to spread carbohydrates evenly, which may stabilize blood sugars.  Plus, snacks are enjoyable!     The amount of carbohydrate needed at snacks varies. Generally, about 15-30 grams of carbohydrate per snack is recommended.  Below you will find some tasty treats.       0-5 gm carb   Crystal Light   Vitamin Water Zero   Herbal tea, unsweetened   2 tsp peanut butter on celery   1./2 cup sugar-free jell-o   1 sugar-free popsicle   ¼ cup blueberries   8oz Blue Anna unsweetened almond milk   5 baby carrots & celery sticks, cucumbers, bell peppers dipped in ¼ cup salsa, 2Tbsp light ranch dressing or 2Tbsp plain Greek yogurt   10 Goldfish crackers   ½ oz low-fat cheese or string cheese   1 closed handful of nuts, unsalted   1 Tbsp of sunflower seeds, unsalted   1 cup Smart Pop popcorn   1 whole grain brown rice cake        15 gm carb   1 small piece of fruit or ½ banana or 1/2 cup lite canned fruit   3 susana cracker squares   3 cups Smart Pop popcorn, top spray butter, Contreras lite salt or cinnamon and Truvia   5 Vanilla Wafers   ½ cup low fat, no added sugar ice cream or frozen yogurt (Blue bell, Blue Bunny, Weight Watchers, Skinny Cow)   ½ turkey, ham, or chicken sandwich   ½ c fruit with ½ c Cottage cheese   4-6 unsalted wheat crackers with 1 oz low fat cheese or 1 tbsp peanut butter    30-45 goldfish crackers (depending on flavor)    7-8 Denominational mini brown rice cakes (caramel, apple cinnamon, chocolate)    12 Denominational mini brown rice cakes (cheddar, bbq, ranch)    1/3 cup hummus dip with raw veg   1/2 whole wheat kena, 1Tbsp hummus   Mini Pizza (1/2 whole wheat English muffin, low-fat  cheese, tomato sauce)   100 calorie snack pack (Oreo, Chips Ahoy, Ritz Mix, Baked Cheetos)   4-6 oz. light or Greek Style yogurt  (Sharyn, Brynn, [facility], [facility])   ½ cup sugar-free pudding     6 in. wheat tortilla or kena oven toasted chips (topped with spray butter flavoring, cinnamon, Truvia OR spray butter, garlic powder, chili powder)    18 BBQ Popchips (available at [employer], [employer], [employer])

## 2020-04-29 NOTE — TELEPHONE ENCOUNTER
Sent ampiciliin.   Will cancel bactrim.   GFR is low.     Please let patient know I sent antibiotic

## 2020-04-29 NOTE — TELEPHONE ENCOUNTER
----- Message from DIANN Velez, PRUDENCE sent at 4/29/2020  3:04 PM CDT -----  Hi Dr. Whittington!  Pt stated that she is having hematuria, less output, UTI symptoms x 2-3 days.  Just wanted to pass message along!  Stay safe,  Brooke

## 2020-04-29 NOTE — TELEPHONE ENCOUNTER
----- Message from Vicki Moncada sent at 4/29/2020 12:50 PM CDT -----  Contact: Dianne (home health nurse): [phone]  Dianne called to speak with nurse state the pt is having urinary issues and would like to know can a urine culture be ordered, also state the pt is having respiratory issues as well       Please contact Dianne (home health nurse): [phone]

## 2020-04-29 NOTE — PROGRESS NOTES
Established Patient - Audio Only Telehealth Visit     The patient location is: home   The chief complaint leading to consultation is: type 2 dm   Visit type: Virtual visit with audio only (telephone)  Time: 15 mins    The reason for the audio only service rather than synchronous audio and video virtual visit was related to technical difficulties or patient preference/necessity.     Each patient to whom I provide medical services by telemedicine is:  (1) informed of the relationship between the physician and patient and the respective role of any other health care provider with respect to management of the patient; and (2) notified that they may decline to receive medical services by telemedicine and may withdraw from such care at any time. Patient verbally consented to receive this service via voice-only telephone call.       HPI: type 2 dm, CKD 4, risk for falls, VIJAYA, insomnia, hypothyroidism, obesity, vit d, hydronephrosis, neurogenic bladder, chronic headache, HLD, HTN, urostomy   UTI, hematuria, not feeling well x 2-3 days      Output: has decreased  Usually empties bag 2x a day at least  Drank a lot of water  Retaining fluid  Drank cranberry/cran/grape     BG 180s -240s    Current dose :  Prandial 12-12-12 units w/ scale 180-230+2, etc  Basal 32 units at night     Assessment and plan:      1. Type 2 diabetes mellitus with diabetic neuropathy, with long-term current use of insulin  Hemoglobin A1c   2. Uncontrolled type 2 diabetes mellitus with chronic kidney disease, with long-term current use of insulin  Hemoglobin A1c   3. Type 2 diabetes mellitus with proteinuria  Hemoglobin A1c   4. Secondary hyperparathyroidism, renal     5. Hypothyroidism, unspecified type     6. Hypoglycemic reaction to insulin in type 2 diabetes mellitus         1. F/u in 3-4 mos w/ Irielle  a1c prior to visit   a1c trending down  Needs new reader-sent message to rep for shipment  Change    INJECT 12 UNITS w/ breakfast and lunch, 15  units w/ dinner, snack dose 4 units, scale 150-200+2, 201-250+4, 251-300+6, 301-350+8, >350+10.   Continue lantus dose  2. See above  3. See above  F/u w/ urology -sent message about possible uti x 2-3 days/hematuria  4. F/u with nephrology prn   5.   Lab Results   Component Value Date    TSH 4.015 (H) 12/11/2019     On lt4  Repeat tsh next time   6. See above  Has personal cgm                    This service was not originating from a related E/M service provided within the previous 7 days nor will  to an E/M service or procedure within the next 24 hours or my soonest available appointment.  Prevailing standard of care was able to be met in this audio-only visit.

## 2020-04-29 NOTE — TELEPHONE ENCOUNTER
Returned call to  nurse, who states pt c/o UTI symptoms of blood-tinged urine in urostomy bag and dysuria. Gave v/o for urine specimen for culture to be done. States message was left w/ pt's PCP regarding respiratory issues pt claims to have been having for a while now.

## 2020-04-30 ENCOUNTER — DOCUMENT SCAN (OUTPATIENT)
Dept: HOME HEALTH SERVICES | Facility: HOSPITAL | Age: 70
End: 2020-04-30
Payer: COMMERCIAL

## 2020-04-30 ENCOUNTER — TELEPHONE (OUTPATIENT)
Dept: UROLOGY | Facility: CLINIC | Age: 70
End: 2020-04-30

## 2020-04-30 NOTE — TELEPHONE ENCOUNTER
Called to ensure patient got ampicillin.  Patient confirmed and states she started it last night.

## 2020-05-01 ENCOUNTER — LAB VISIT (OUTPATIENT)
Dept: LAB | Facility: HOSPITAL | Age: 70
End: 2020-05-01
Attending: UROLOGY
Payer: MEDICARE

## 2020-05-01 DIAGNOSIS — N31.9 NEUROGENIC DYSFUNCTION OF THE URINARY BLADDER: Primary | ICD-10-CM

## 2020-05-01 PROCEDURE — 87086 URINE CULTURE/COLONY COUNT: CPT

## 2020-05-01 PROCEDURE — 87077 CULTURE AEROBIC IDENTIFY: CPT

## 2020-05-01 PROCEDURE — 87186 SC STD MICRODIL/AGAR DIL: CPT

## 2020-05-01 PROCEDURE — 87088 URINE BACTERIA CULTURE: CPT

## 2020-05-04 ENCOUNTER — PATIENT MESSAGE (OUTPATIENT)
Dept: UROLOGY | Facility: CLINIC | Age: 70
End: 2020-05-04

## 2020-05-04 LAB — BACTERIA UR CULT: ABNORMAL

## 2020-05-06 ENCOUNTER — TELEPHONE (OUTPATIENT)
Dept: INTERNAL MEDICINE | Facility: CLINIC | Age: 70
End: 2020-05-06

## 2020-05-06 ENCOUNTER — PATIENT MESSAGE (OUTPATIENT)
Dept: INTERNAL MEDICINE | Facility: CLINIC | Age: 70
End: 2020-05-06

## 2020-05-06 DIAGNOSIS — Z79.4 TYPE 2 DIABETES MELLITUS WITH DIABETIC NEUROPATHY, WITH LONG-TERM CURRENT USE OF INSULIN: ICD-10-CM

## 2020-05-06 DIAGNOSIS — R80.9 TYPE 2 DIABETES MELLITUS WITH PROTEINURIA: Primary | ICD-10-CM

## 2020-05-06 DIAGNOSIS — E11.29 TYPE 2 DIABETES MELLITUS WITH PROTEINURIA: Primary | ICD-10-CM

## 2020-05-06 DIAGNOSIS — E11.40 TYPE 2 DIABETES MELLITUS WITH DIABETIC NEUROPATHY, WITH LONG-TERM CURRENT USE OF INSULIN: ICD-10-CM

## 2020-05-07 ENCOUNTER — TELEPHONE (OUTPATIENT)
Dept: SURGERY | Facility: CLINIC | Age: 70
End: 2020-05-07

## 2020-05-07 ENCOUNTER — PATIENT MESSAGE (OUTPATIENT)
Dept: NEUROLOGY | Facility: CLINIC | Age: 70
End: 2020-05-07

## 2020-05-07 DIAGNOSIS — Z12.31 ENCOUNTER FOR SCREENING MAMMOGRAM FOR MALIGNANT NEOPLASM OF BREAST: ICD-10-CM

## 2020-05-07 DIAGNOSIS — Z12.39 SCREENING FOR MALIGNANT NEOPLASM OF BREAST: Primary | ICD-10-CM

## 2020-05-07 NOTE — TELEPHONE ENCOUNTER
Call to pt to schedule f/u with Dr Fulton w/mammogram in June. Appt scheduled on 6/23/2020, 2:00 pm for mammogram and 2:15 pm for Dr Fulton appt. Pt accepts these time and date.

## 2020-05-08 ENCOUNTER — EXTERNAL HOME HEALTH (OUTPATIENT)
Dept: HOME HEALTH SERVICES | Facility: HOSPITAL | Age: 70
End: 2020-05-08
Payer: MEDICARE

## 2020-05-11 ENCOUNTER — OFFICE VISIT (OUTPATIENT)
Dept: INTERNAL MEDICINE | Facility: CLINIC | Age: 70
End: 2020-05-11
Payer: MEDICARE

## 2020-05-11 DIAGNOSIS — R05.9 COUGH: ICD-10-CM

## 2020-05-11 DIAGNOSIS — J01.90 ACUTE BACTERIAL SINUSITIS: Primary | ICD-10-CM

## 2020-05-11 DIAGNOSIS — B96.89 ACUTE BACTERIAL SINUSITIS: Primary | ICD-10-CM

## 2020-05-11 PROCEDURE — 99441 PR PHYSICIAN TELEPHONE EVALUATION 5-10 MIN: ICD-10-PCS | Mod: 95,,, | Performed by: INTERNAL MEDICINE

## 2020-05-11 PROCEDURE — 99441 PR PHYSICIAN TELEPHONE EVALUATION 5-10 MIN: CPT | Mod: 95,,, | Performed by: INTERNAL MEDICINE

## 2020-05-11 RX ORDER — AMOXICILLIN AND CLAVULANATE POTASSIUM 875; 125 MG/1; MG/1
1 TABLET, FILM COATED ORAL 2 TIMES DAILY
Qty: 14 TABLET | Refills: 0 | Status: SHIPPED | OUTPATIENT
Start: 2020-05-11 | End: 2020-05-18

## 2020-05-11 RX ORDER — LEVOCETIRIZINE DIHYDROCHLORIDE 5 MG/1
5 TABLET, FILM COATED ORAL NIGHTLY
Qty: 30 TABLET | Refills: 0 | Status: SHIPPED | OUTPATIENT
Start: 2020-05-11 | End: 2020-06-02

## 2020-05-11 RX ORDER — AZELASTINE 1 MG/ML
1 SPRAY, METERED NASAL 2 TIMES DAILY
Qty: 30 ML | Refills: 0 | Status: SHIPPED | OUTPATIENT
Start: 2020-05-11 | End: 2020-06-02

## 2020-05-11 RX ORDER — PROMETHAZINE HYDROCHLORIDE AND CODEINE PHOSPHATE 6.25; 1 MG/5ML; MG/5ML
5 SOLUTION ORAL NIGHTLY PRN
Qty: 118 ML | Refills: 0 | Status: SHIPPED | OUTPATIENT
Start: 2020-05-11 | End: 2020-05-21

## 2020-05-11 NOTE — PROGRESS NOTES
Subjective:       Patient ID: Cecile Bowen is a 69 y.o. female.    Chief Complaint: No chief complaint on file.    Patient is a 69 y.o.female who presents today for sinus and cough  Established Patient - Audio Only Telehealth Visit     The patient location is: [place]  The chief complaint leading to consultation is: sinus congestion and cough  Visit type: Virtual visit with audio only (telephone)  Total time spent with patient: 10 min       The reason for the audio only service rather than synchronous audio and video virtual visit was related to technical difficulties or patient preference/necessity.     Each patient to whom I provide medical services by telemedicine is:  (1) informed of the relationship between the physician and patient and the respective role of any other health care provider with respect to management of the patient; and (2) notified that they may decline to receive medical services by telemedicine and may withdraw from such care at any time. Patient verbally consented to receive this service via voice-only telephone call.       HPI:     - suffering with sinuses since highschool  - symptoms are currently severe  - seems to be induced by the pollen  - she is taking benadryl at night but didn't help  - sinuses are very clogged; notes sinus pressure and congestion; colored nasal discharge; face hurts when she pushes on it  - no fever or chills  - she also has a cough that is producing yellow sputum  - no sob or wheezing  - (+) hoarseness  - temp 97.8; blood pressure and pulse normal at home  - no exposure to covid 19; has been staying home           This service was not originating from a related E/M service provided within the previous 7 days nor will  to an E/M service or procedure within the next 24 hours or my soonest available appointment.  Prevailing standard of care was able to be met in this audio-only visit.    Review of Systems   Constitutional: Negative for appetite change, chills,  diaphoresis and fever.   HENT: Positive for congestion, postnasal drip, rhinorrhea, sinus pressure and sinus pain. Negative for ear discharge, ear pain, tinnitus, trouble swallowing and voice change.    Eyes: Negative for discharge, redness and itching.   Respiratory: Positive for cough. Negative for chest tightness, shortness of breath and wheezing.    Cardiovascular: Negative for chest pain, palpitations and leg swelling.   Gastrointestinal: Negative for abdominal pain, constipation, diarrhea, nausea and vomiting.   Endocrine: Negative for cold intolerance and heat intolerance.   Genitourinary: Negative for difficulty urinating, flank pain, hematuria and urgency.   Musculoskeletal: Negative for arthralgias, gait problem, myalgias and neck stiffness.   Skin: Negative for color change and rash.   Neurological: Negative for dizziness, seizures, syncope and headaches.   Hematological: Negative for adenopathy.   Psychiatric/Behavioral: Negative for agitation, behavioral problems, confusion and sleep disturbance.       Objective:      Physical Exam   Psychiatric: She has a normal mood and affect. Her behavior is normal. Judgment and thought content normal.       Assessment and Plan:       1. Acute bacterial sinusitis    - amoxicillin-clavulanate 875-125mg (AUGMENTIN) 875-125 mg per tablet; Take 1 tablet by mouth 2 (two) times daily. for 7 days  Dispense: 14 tablet; Refill: 0  - azelastine (ASTELIN) 137 mcg (0.1 %) nasal spray; 1 spray (137 mcg total) by Nasal route 2 (two) times daily.  Dispense: 30 mL; Refill: 0  - levocetirizine (XYZAL) 5 MG tablet; Take 1 tablet (5 mg total) by mouth every evening.  Dispense: 30 tablet; Refill: 0    2. Cough  - cough syrup may cause drowsiness  - promethazine-codeine 6.25-10 mg/5 ml (PHENERGAN WITH CODEINE) 6.25-10 mg/5 mL syrup; Take 5 mLs by mouth nightly as needed for Cough.  Dispense: 118 mL; Refill: 0      Notify clinic if symptoms change, worsen or do not improve        No  follow-ups on file.

## 2020-05-12 ENCOUNTER — TELEPHONE (OUTPATIENT)
Dept: PHARMACY | Facility: CLINIC | Age: 70
End: 2020-05-12

## 2020-05-12 NOTE — TELEPHONE ENCOUNTER
Refill call regarding Aimovig at [facility]. Will prepare for shipment with consent of patient on  to arrive 5/15. Next injection . Copay $0.00. Patient has started taking a new medication, transferring to [facility]. Patient has not had any medication/ dose or instruction changes. No new allergies or side effects reported with this shipment. Medication is being taken as prescribed by physician and properly stored. Two patient identifiers:  and Address verified. Sharps container requested.

## 2020-05-13 ENCOUNTER — TELEPHONE (OUTPATIENT)
Dept: NEUROLOGY | Facility: CLINIC | Age: 70
End: 2020-05-13

## 2020-05-19 ENCOUNTER — TELEPHONE (OUTPATIENT)
Dept: NEUROLOGY | Facility: CLINIC | Age: 70
End: 2020-05-19

## 2020-05-19 ENCOUNTER — DOCUMENTATION ONLY (OUTPATIENT)
Dept: NEUROLOGY | Facility: HOSPITAL | Age: 70
End: 2020-05-19

## 2020-05-19 NOTE — PROGRESS NOTES
Patient is at increased risk of heart attack and stroke with triptan use due to vascular risk factors including hypertension requiring multiple agents, hyperlipidemia, and diabetes which have resulted in CKD as well as advanced age.  Will attempt ubrlevly for migraine treatment.

## 2020-05-19 NOTE — TELEPHONE ENCOUNTER
Called [facility] and informed them that Dr Henao has placed documentation requested by insurance company for Ubrelvy approval.

## 2020-05-22 ENCOUNTER — TELEPHONE (OUTPATIENT)
Dept: INTERNAL MEDICINE | Facility: CLINIC | Age: 70
End: 2020-05-22

## 2020-05-22 DIAGNOSIS — R80.9 TYPE 2 DIABETES MELLITUS WITH PROTEINURIA: Primary | ICD-10-CM

## 2020-05-22 DIAGNOSIS — E11.29 TYPE 2 DIABETES MELLITUS WITH PROTEINURIA: Primary | ICD-10-CM

## 2020-05-22 NOTE — TELEPHONE ENCOUNTER
----- Message from Teena Goodwin sent at 5/22/2020 10:19 AM CDT -----  Contact: [facility] [phone]  Type: Home Health (orders, updates, clarifications, etc.)    Home Health Agency/Nurse: [facility]     Phone number: [phone]    Reason for call:  Patient is requesting A1c draw

## 2020-05-25 ENCOUNTER — DOCUMENT SCAN (OUTPATIENT)
Dept: HOME HEALTH SERVICES | Facility: HOSPITAL | Age: 70
End: 2020-05-25
Payer: COMMERCIAL

## 2020-05-25 ENCOUNTER — LAB VISIT (OUTPATIENT)
Dept: LAB | Facility: HOSPITAL | Age: 70
End: 2020-05-25
Attending: NURSE PRACTITIONER
Payer: MEDICARE

## 2020-05-25 DIAGNOSIS — E11.29 TYPE II DIABETES MELLITUS WITH RENAL MANIFESTATIONS: Primary | ICD-10-CM

## 2020-05-25 LAB
ESTIMATED AVG GLUCOSE: 197 MG/DL (ref 68–131)
HBA1C MFR BLD HPLC: 8.5 % (ref 4–5.6)

## 2020-05-25 PROCEDURE — 83036 HEMOGLOBIN GLYCOSYLATED A1C: CPT

## 2020-05-28 ENCOUNTER — OFFICE VISIT (OUTPATIENT)
Dept: PHYSICAL MEDICINE AND REHAB | Facility: CLINIC | Age: 70
End: 2020-05-28
Payer: MEDICARE

## 2020-05-28 DIAGNOSIS — E66.01 MORBID OBESITY WITH BMI OF 40.0-44.9, ADULT: ICD-10-CM

## 2020-05-28 DIAGNOSIS — G56.03 BILATERAL CARPAL TUNNEL SYNDROME: ICD-10-CM

## 2020-05-28 DIAGNOSIS — G89.29 CHRONIC MIDLINE LOW BACK PAIN WITHOUT SCIATICA: Primary | ICD-10-CM

## 2020-05-28 DIAGNOSIS — M79.7 FIBROMYALGIA: ICD-10-CM

## 2020-05-28 DIAGNOSIS — G89.29 CHRONIC MIDLINE THORACIC BACK PAIN: ICD-10-CM

## 2020-05-28 DIAGNOSIS — M54.6 CHRONIC MIDLINE THORACIC BACK PAIN: ICD-10-CM

## 2020-05-28 DIAGNOSIS — M54.50 CHRONIC MIDLINE LOW BACK PAIN WITHOUT SCIATICA: Primary | ICD-10-CM

## 2020-05-28 DIAGNOSIS — M54.2 CHRONIC NECK PAIN: ICD-10-CM

## 2020-05-28 DIAGNOSIS — M47.812 SPONDYLOSIS OF CERVICAL REGION WITHOUT MYELOPATHY OR RADICULOPATHY: ICD-10-CM

## 2020-05-28 DIAGNOSIS — Z79.891 CHRONIC USE OF OPIATE FOR THERAPEUTIC PURPOSE: ICD-10-CM

## 2020-05-28 DIAGNOSIS — M94.0 COSTOCHONDRITIS: ICD-10-CM

## 2020-05-28 DIAGNOSIS — G89.29 CHRONIC NECK PAIN: ICD-10-CM

## 2020-05-28 DIAGNOSIS — M47.816 SPONDYLOSIS OF LUMBAR REGION WITHOUT MYELOPATHY OR RADICULOPATHY: ICD-10-CM

## 2020-05-28 PROCEDURE — 99443 PR PHYSICIAN TELEPHONE EVALUATION 21-30 MIN: CPT | Mod: 95,,, | Performed by: PHYSICAL MEDICINE & REHABILITATION

## 2020-05-28 PROCEDURE — 99443 PR PHYSICIAN TELEPHONE EVALUATION 21-30 MIN: ICD-10-PCS | Mod: 95,,, | Performed by: PHYSICAL MEDICINE & REHABILITATION

## 2020-05-28 NOTE — PROGRESS NOTES
Subjective:       Patient ID: Cecile Bowen is a 69 y.o. female.      A telemedicine Audio Visit is being done today (due to the COVID-19 restrictions).     The patient location is: home  The chief complaint leading to consultation is: back pain, neck pain.  Visit type: Virtual visit with synchronous audio  Total time spent with patient: 20 min  Each patient to whom he or she provides medical services by telemedicine is:  (1) informed of the relationship between the physician and patient and the respective role of any other health care provider with respect to management of the patient; and (2) notified that he or she may decline to receive medical services by telemedicine and may withdraw from such care at any time.  This service was not originating from a related E/M service provided within the previous 7 days nor will  to an E/M service or procedure within the next 24 hours or my soonest available appointment.  Prevailing standard of care was able to be met in this audio-only visit.      Chief Complaint: No chief complaint on file.    HPI     HISTORY OF PRESENT ILLNESS:  Ms. Bowen is a 69-year-old white female with past medical history of HTN, DM, CKD, hypothyroidism, fibromyalgia, status post left mastectomy in 08/2019, morbid obesity (BMI in the 40s) and OA.  She is followed up in  the [facility] Medicine Clinic for chronic low back pain due to DJD, status post multiple procedures including RFAs and ESIs, chronic thoracic pain, chronic neck pain and bilateral carpal tunnel syndrome.  Her last visit was on 1/14/82020. She was maintained on venlafaxine, p.r.n. Hydrocodone/APAP, p.r.n. Methocarbamol and diclofenac gel. A Pain Clinic Drug Screen was obtained and was positive as expected for hydrocodone.    The patient's back pain has been worse.  It is a constant aching pain in the lumbar spine and across her back.  She denies any radiation to her legs.  Her maximum pain is 9/10 and minimum 4/10.  Today, it  is 7/10.  The patient denies any change in her lower extremity strength.  She has chronic bowel urgency.  She is status post suprapubic catheter due to neurogenic bladder.    Her neck pain has been better.  It is an intermittent aching pain in the cervical spine.  She denies any radiation to her arms.  Her maximum pain is 5/10 and minimum 2/10.  Today, it is 2/10.  The patient denies any upper extremity weakness.      Her bilateral hand numbness has been under good control.  She has not had to use carpal tunnel splints often.      She continues to complain of generalized aching, fatigue and stiffness due to fibromyalgia.  *    She is currently taking venlafaxine XR 75 mg capsules, 3 capsules once per day.  She takes hydrocodone/APAP 10/325 p.r.n. four times per day.  She takes methocarbamol 750 mg p.o. p.r.n., usually one or two tablets three times per day.  She has Voltaren gel topically for painful joints, usually twice.      Past Medical History:   Diagnosis Date    Cervicogenic migraine     Chronic pain     CKD (chronic kidney disease) stage 4, GFR 15-29 ml/min     [facility]    CKD (chronic kidney disease) stage 4, GFR 15-29 ml/min     Diabetes mellitus     Long term use of Insulin, Diabetic Neuropathy    Fibromyalgia     Hydronephrosis     Hyperlipidemia     Hypertension 12/12/2012    Hypothyroidism 12/12/2012    ARON (iron deficiency anemia)     Insomnia     Levoscoliosis     Malignant neoplasm of upper-outer quadrant of left breast in female, estrogen receptor positive     Metabolic acidosis     Mobility impaired     Nuclear sclerosis - Both Eyes 3/24/2014    VIJAYA (obstructive sleep apnea)     Osteopenia     Pulmonary nodule     Recurrent UTI     Renal manifestation of secondary diabetes mellitus     Secondary hyperparathyroidism, renal     Urinary retention          Review of Systems   Constitutional: Positive for fatigue. Negative for chills and fever.   Eyes: Negative for visual  disturbance.   Respiratory: Negative for shortness of breath.    Cardiovascular: Positive for chest pain (sternum).   Gastrointestinal: Negative for abdominal pain, constipation, nausea and vomiting.   Genitourinary:        S/p suprapubic catheter   Musculoskeletal: Positive for arthralgias, back pain, gait problem, neck pain and neck stiffness. Negative for joint swelling.   Neurological: Positive for weakness and headaches. Negative for dizziness.   Psychiatric/Behavioral: Positive for sleep disturbance. Negative for behavioral problems.       Objective:      Physical Exam      N/A    BUE:  The patient reports functional range of motion and good strength .    BLE:  The patient reports functional range of motion and mild leg weakness .    Assessment:       1. Chronic midline low back pain without sciatica    2. Spondylosis of lumbar region without myelopathy or radiculopathy    3. Chronic midline thoracic back pain    4. Chronic neck pain    5. Spondylosis of cervical region without myelopathy or radiculopathy    6. Fibromyalgia    7. Bilateral carpal tunnel syndrome    8. Costochondritis    9. Morbid obesity with BMI of 40.0-44.9, adult    10. Chronic use of opiate for therapeutic purpose        Plan:       - NSAIDs will be avoided due to CKD.  - Continue venlafaxine -XR 75 mg capsule, 3 capsules (225 mg total) once daily (for depression but should help with arthritic pain and fibromyalgia).  - Continue HYDROcodone-acetaminophen (NORCO)  mg per tablet; Take 1 tablet by mouth every 6 (six) hours as needed.  - Continue methocarbamol (ROBAXIN) 750 MG Tab; Take 1-2 tablets (750-1,500 mg total) by mouth 3 (three) times daily as needed.  - Continue diclofenac sodium (VOLTAREN) 1 % Gel; Apply 2 g topically 3-4 times daily p.r.n. (to costochondral areas).  - Continue to wear Carpal Tunnel Splints at night as needed.  - Regular home exercise program was encouraged.  - Follow up in about 4 months (around 9/28/2020).        This note was partly generated with [employer] voice recognition software. I apologize for any possible typographical errors.

## 2020-05-29 RX ORDER — BUTALBITAL, ACETAMINOPHEN AND CAFFEINE 50; 325; 40 MG/1; MG/1; MG/1
TABLET ORAL
Qty: 10 TABLET | Refills: 0 | Status: SHIPPED | OUTPATIENT
Start: 2020-05-29 | End: 2020-08-12

## 2020-05-30 RX ORDER — GEMFIBROZIL 600 MG/1
600 TABLET, FILM COATED ORAL
Qty: 48 TABLET | Refills: 0 | Status: SHIPPED | OUTPATIENT
Start: 2020-06-01 | End: 2020-08-20

## 2020-06-02 DIAGNOSIS — G43.719 INTRACTABLE CHRONIC MIGRAINE WITHOUT AURA AND WITHOUT STATUS MIGRAINOSUS: Primary | ICD-10-CM

## 2020-06-07 PROCEDURE — G0179 MD RECERTIFICATION HHA PT: HCPCS | Mod: ,,, | Performed by: UROLOGY

## 2020-06-07 PROCEDURE — G0179 PR HOME HEALTH MD RECERTIFICATION: ICD-10-PCS | Mod: ,,, | Performed by: UROLOGY

## 2020-06-09 ENCOUNTER — TELEPHONE (OUTPATIENT)
Dept: PHARMACY | Facility: CLINIC | Age: 70
End: 2020-06-09

## 2020-06-09 ENCOUNTER — TELEPHONE (OUTPATIENT)
Dept: UROLOGY | Facility: CLINIC | Age: 70
End: 2020-06-09

## 2020-06-09 DIAGNOSIS — N32.89 BLADDER SPASMS: ICD-10-CM

## 2020-06-09 DIAGNOSIS — N31.9 NEUROGENIC BLADDER: Primary | ICD-10-CM

## 2020-06-09 DIAGNOSIS — Z01.818 PRE-OP EVALUATION: ICD-10-CM

## 2020-06-10 ENCOUNTER — TELEPHONE (OUTPATIENT)
Dept: UROLOGY | Facility: CLINIC | Age: 70
End: 2020-06-10

## 2020-06-10 DIAGNOSIS — N39.0 BACTERIAL UTI: Primary | ICD-10-CM

## 2020-06-10 DIAGNOSIS — A49.9 BACTERIAL UTI: Primary | ICD-10-CM

## 2020-06-10 DIAGNOSIS — N31.9 NEUROGENIC BLADDER: ICD-10-CM

## 2020-06-10 NOTE — TELEPHONE ENCOUNTER
Sounds good to me  ----- Message from Grazyna Otto MA sent at 6/9/2020  1:35 PM CDT -----  Can you please put a urine order in for this pt and let me if this pt needs anything else prior to surgery on 6/29.

## 2020-06-28 DIAGNOSIS — B96.89 ACUTE BACTERIAL SINUSITIS: ICD-10-CM

## 2020-06-28 DIAGNOSIS — J01.90 ACUTE BACTERIAL SINUSITIS: ICD-10-CM

## 2020-06-28 RX ORDER — LEVOCETIRIZINE DIHYDROCHLORIDE 5 MG/1
TABLET, FILM COATED ORAL
Qty: 30 TABLET | Refills: 12 | Status: ON HOLD | OUTPATIENT
Start: 2020-06-28 | End: 2021-06-02 | Stop reason: HOSPADM

## 2020-06-28 RX ORDER — AZELASTINE 1 MG/ML
SPRAY, METERED NASAL
Qty: 30 ML | Refills: 12 | Status: ON HOLD | OUTPATIENT
Start: 2020-06-28 | End: 2021-06-02 | Stop reason: SDUPTHER

## 2020-07-02 ENCOUNTER — TELEPHONE (OUTPATIENT)
Dept: PHARMACY | Facility: CLINIC | Age: 70
End: 2020-07-02

## 2020-07-02 DIAGNOSIS — E11.9 TYPE 2 DIABETES MELLITUS WITHOUT COMPLICATION: ICD-10-CM

## 2020-07-06 ENCOUNTER — PATIENT MESSAGE (OUTPATIENT)
Dept: ADMINISTRATIVE | Facility: HOSPITAL | Age: 70
End: 2020-07-06

## 2020-07-06 ENCOUNTER — TELEPHONE (OUTPATIENT)
Dept: HEMATOLOGY/ONCOLOGY | Facility: CLINIC | Age: 70
End: 2020-07-06

## 2020-07-06 DIAGNOSIS — G89.29 CHRONIC NECK PAIN: ICD-10-CM

## 2020-07-06 DIAGNOSIS — M54.50 CHRONIC MIDLINE LOW BACK PAIN WITHOUT SCIATICA: ICD-10-CM

## 2020-07-06 DIAGNOSIS — G89.29 CHRONIC MIDLINE LOW BACK PAIN WITHOUT SCIATICA: ICD-10-CM

## 2020-07-06 DIAGNOSIS — M54.2 CHRONIC NECK PAIN: ICD-10-CM

## 2020-07-06 DIAGNOSIS — M79.7 FIBROMYALGIA: ICD-10-CM

## 2020-07-06 RX ORDER — HYDROCODONE BITARTRATE AND ACETAMINOPHEN 10; 325 MG/1; MG/1
1 TABLET ORAL EVERY 6 HOURS PRN
Qty: 120 TABLET | Refills: 0 | Status: SHIPPED | OUTPATIENT
Start: 2020-07-06 | End: 2020-08-07 | Stop reason: SDUPTHER

## 2020-07-06 NOTE — TELEPHONE ENCOUNTER
Spoke with patient and was able to change her appointment to a virtual audio appointment for tomorrow.     ----- Message from Maria Luisa Butler sent at 7/6/2020  3:16 PM CDT -----  Regarding: PT  Contact: PT  PT has an appointment tomorrow and wanted to know if it could be changed to a virtual visit. Please call back     Callback: [phone]

## 2020-07-07 ENCOUNTER — OFFICE VISIT (OUTPATIENT)
Dept: HEMATOLOGY/ONCOLOGY | Facility: CLINIC | Age: 70
End: 2020-07-07
Payer: MEDICARE

## 2020-07-07 ENCOUNTER — EXTERNAL HOME HEALTH (OUTPATIENT)
Dept: HOME HEALTH SERVICES | Facility: HOSPITAL | Age: 70
End: 2020-07-07
Payer: MEDICARE

## 2020-07-07 DIAGNOSIS — C50.412 MALIGNANT NEOPLASM OF UPPER-OUTER QUADRANT OF LEFT BREAST IN FEMALE, ESTROGEN RECEPTOR POSITIVE: Primary | ICD-10-CM

## 2020-07-07 DIAGNOSIS — Z17.0 MALIGNANT NEOPLASM OF UPPER-OUTER QUADRANT OF LEFT BREAST IN FEMALE, ESTROGEN RECEPTOR POSITIVE: Primary | ICD-10-CM

## 2020-07-07 PROCEDURE — 99442 PR PHYSICIAN TELEPHONE EVALUATION 11-20 MIN: CPT | Mod: 95,,, | Performed by: INTERNAL MEDICINE

## 2020-07-07 PROCEDURE — 99442 PR PHYSICIAN TELEPHONE EVALUATION 11-20 MIN: ICD-10-PCS | Mod: 95,,, | Performed by: INTERNAL MEDICINE

## 2020-07-07 NOTE — PROGRESS NOTES
Subjective:       Patient ID: Cecile Bowen is a 69 y.o. female.    Chief Complaint: No chief complaint on file.    HPI       The patient location is: Home  The chief complaint leading to consultation is:  Adjuvant treatment of breast cancer    Visit type: Audio only    Face to Face time with patient:   Fifteen minutes of total time spent on the encounter, which includes time on the phone plus non-face to face time preparing to see the patient (eg, review of tests), Obtaining and/or reviewing separately obtained history, Documenting clinical information in the electronic or other health record, Independently interpreting results (not separately reported) and communicating results to the patient/family/caregiver, or Care coordination (not separately reported).       Each patient to whom he or she provides medical services by telemedicine is:  (1) informed of the relationship between the physician and patient and the respective role of any other health care provider with respect to management of the patient; and (2) notified that he or she may decline to receive medical services by telemedicine and may withdraw from such care at any time.       Ms. Bowen had an appointment with me today, however due to the COVID-19 pandemic it was switched to a virtual visit.  She has been on anastrazole since October 1, 2019, and has tolerated it well so far.   Briefly, she is a pleasant 69-year-old female with multiple comorbidities who on 08/01/2019, underwent a left modified radical mastectomy with sentinel lymph node biopsy for a 1.2 cm grade 1 carcinoma that was ER strongly positive, FL positive and HER-2 negative.   Resection margins were clear and white.  Unfortunately, one of two sentinel lymph nodes had micrometastases.    Of note, a MammaPrint was low score predicting for 97.8% probability of disease free survival at five years with hormonal management.  She was deemed a poor candidate for chemotherapy and she was  started on anastrazole in mid September 2019.    Review of Systems    Overall she feels OK.  She denies any anxiety, depression, easy bruising, fevers, chills, night  sweats, weight loss, nausea, vomiting, diarrhea, constipation, diplopia, blurred vision, chest pain, palpitations, breast pain, abdominal pain, extremity pain, or difficulty ambulating.  The remainder of the ten-point ROS, including general, skin, lymph, H/N, cardiorespiratory, GI, , Neuro, Endocrine, and psychiatric is negative.     Objective:      Physical Exam      Deferred.  This was a virtual visit.    Assessment:       1. Malignant neoplasm of upper-outer quadrant of left breast in female, estrogen receptor positive      2.    DM, Morbid obesity, extensive DJD, s/p suprabubic cystostomy.  4.      Osteopenia  Plan:         I had a long discussion with her;   She will remain on anastrazole through September 2024.  I will see her again in 3 months.    Her multiple questions were answered to her satisfaction.

## 2020-07-07 NOTE — PROGRESS NOTES
60 Day Recert Order # [id number]  [facility] Period: 06/07 to 08/05/2020 with [facility] - Dr. Ronel Whittington

## 2020-07-17 DIAGNOSIS — Z71.89 COMPLEX CARE COORDINATION: ICD-10-CM

## 2020-07-28 ENCOUNTER — TELEPHONE (OUTPATIENT)
Dept: PHARMACY | Facility: CLINIC | Age: 70
End: 2020-07-28

## 2020-07-28 ENCOUNTER — TELEPHONE (OUTPATIENT)
Dept: NEPHROLOGY | Facility: CLINIC | Age: 70
End: 2020-07-28

## 2020-07-28 NOTE — TELEPHONE ENCOUNTER
----- Message from Jeniffer Brooks LPN sent at 7/13/2020 10:53 AM CDT -----      ----- Message -----  From: SYSTEM  Sent: 5/30/2020  12:21 AM CDT  To: [facility]ph Clinical Support

## 2020-08-03 ENCOUNTER — OFFICE VISIT (OUTPATIENT)
Dept: INTERNAL MEDICINE | Facility: CLINIC | Age: 70
End: 2020-08-03
Payer: MEDICARE

## 2020-08-03 ENCOUNTER — TELEPHONE (OUTPATIENT)
Dept: INTERNAL MEDICINE | Facility: CLINIC | Age: 70
End: 2020-08-03

## 2020-08-03 DIAGNOSIS — Z79.4 TYPE 2 DIABETES MELLITUS WITH DIABETIC NEUROPATHY, WITH LONG-TERM CURRENT USE OF INSULIN: Primary | ICD-10-CM

## 2020-08-03 DIAGNOSIS — E78.5 HYPERLIPIDEMIA ASSOCIATED WITH TYPE 2 DIABETES MELLITUS: ICD-10-CM

## 2020-08-03 DIAGNOSIS — E11.59 HYPERTENSION ASSOCIATED WITH DIABETES: ICD-10-CM

## 2020-08-03 DIAGNOSIS — E11.40 TYPE 2 DIABETES MELLITUS WITH DIABETIC NEUROPATHY, WITH LONG-TERM CURRENT USE OF INSULIN: Primary | ICD-10-CM

## 2020-08-03 DIAGNOSIS — E66.9 DIABETES MELLITUS TYPE 2 IN OBESE: ICD-10-CM

## 2020-08-03 DIAGNOSIS — E11.69 DIABETES MELLITUS TYPE 2 IN OBESE: ICD-10-CM

## 2020-08-03 DIAGNOSIS — I15.2 HYPERTENSION ASSOCIATED WITH DIABETES: ICD-10-CM

## 2020-08-03 DIAGNOSIS — E03.9 HYPOTHYROIDISM, UNSPECIFIED TYPE: ICD-10-CM

## 2020-08-03 DIAGNOSIS — N18.4 CKD STAGE 4 DUE TO TYPE 2 DIABETES MELLITUS: ICD-10-CM

## 2020-08-03 DIAGNOSIS — E11.69 HYPERLIPIDEMIA ASSOCIATED WITH TYPE 2 DIABETES MELLITUS: ICD-10-CM

## 2020-08-03 DIAGNOSIS — Z17.0 MALIGNANT NEOPLASM OF UPPER-OUTER QUADRANT OF LEFT BREAST IN FEMALE, ESTROGEN RECEPTOR POSITIVE: ICD-10-CM

## 2020-08-03 DIAGNOSIS — E11.22 CKD STAGE 4 DUE TO TYPE 2 DIABETES MELLITUS: ICD-10-CM

## 2020-08-03 DIAGNOSIS — C50.412 MALIGNANT NEOPLASM OF UPPER-OUTER QUADRANT OF LEFT BREAST IN FEMALE, ESTROGEN RECEPTOR POSITIVE: ICD-10-CM

## 2020-08-03 DIAGNOSIS — N13.30 HYDRONEPHROSIS, UNSPECIFIED HYDRONEPHROSIS TYPE: ICD-10-CM

## 2020-08-03 DIAGNOSIS — M79.7 FIBROMYALGIA: ICD-10-CM

## 2020-08-03 PROCEDURE — 99442 PR PHYSICIAN TELEPHONE EVALUATION 11-20 MIN: ICD-10-PCS | Mod: 95,,, | Performed by: NURSE PRACTITIONER

## 2020-08-03 PROCEDURE — 99442 PR PHYSICIAN TELEPHONE EVALUATION 11-20 MIN: CPT | Mod: 95,,, | Performed by: NURSE PRACTITIONER

## 2020-08-03 RX ORDER — INSULIN GLARGINE 100 [IU]/ML
INJECTION, SOLUTION SUBCUTANEOUS
Qty: 15 ML | Refills: 6
Start: 2020-08-03 | End: 2021-01-08

## 2020-08-03 NOTE — PROGRESS NOTES
Established Patient - Audio Only Telehealth Visit     The patient location is: home   The chief complaint leading to consultation is: type 2 DM   Visit type: Virtual visit with audio only (telephone)  Total time spent with patient: 15 mins     Current med: lantus 32 units at night around 8p , humalog 12 -12-12 units    bg 40s-60s overnight  On MDI, injections 4 x a day  Testing 4 x a day  Patient is willing and able to use the device  Demonstrated an understanding of the technology and is motivated to use CGM  Patient expected to adhere to a comprehensive diabetes treatment plan and patient has adequate medical supervision  Patient experiences multiple impaired awareness of hypoglycemia (hypoglycemia unawareness)       Dietary habits:  2-3 meals  Last meal around 7p      The reason for the audio only service rather than synchronous audio and video virtual visit was related to technical difficulties or patient preference/necessity.     Each patient to whom I provide medical services by telemedicine is:  (1) informed of the relationship between the physician and patient and the respective role of any other health care provider with respect to management of the patient; and (2) notified that they may decline to receive medical services by telemedicine and may withdraw from such care at any time. Patient verbally consented to receive this service via voice-only telephone call.         HPI: Type 2 DM w/ kidney complications w/ hypoglycemia       Assessment/plan:     1. Type 2 diabetes mellitus with diabetic neuropathy, with long-term current use of insulin  Hemoglobin A1C    Basic metabolic panel    Hemoglobin A1C    Basic metabolic panel   2. Diabetes mellitus type 2 in obese     3. Uncontrolled type 2 diabetes mellitus with chronic kidney disease, with long-term current use of insulin     4. CKD stage 4 due to type 2 diabetes mellitus     5. Hypertension associated with diabetes     6. Hyperlipidemia associated with  type 2 diabetes mellitus  Lipid Panel    Lipid Panel   7. Malignant neoplasm of upper-outer quadrant of left breast in female, estrogen receptor positive     8. Hydronephrosis, unspecified hydronephrosis type     9. Fibromyalgia     10. Hypothyroidism, unspecified type  TSH    TSH            1.-3. F/u in 4 mos w/ me  a1c bmp tsh, lipid this week  External labs needed per pcp and diabetes clinic  Pt missed last appt  Reports lows -nocturnal  Cut back lantus to 24 units 25% decrease  Continue prandial 12-12-12 at this time  Will revisit if adjustments are needed soon   a1c goal less than 7.5%  Has refills   4. Avoid hypoglycemia  F/u with nephrology   5. Continue med(s)  6.   Lab Results   Component Value Date    LDLCALC 92.8 06/26/2019     At goal   Lipid needed   7. F/u with breast sx center  8. See above  9. F/u with pcp, rheumatology prn   10. On lt4  Check this week per   Lab Results   Component Value Date    TSH 4.015 (H) 12/11/2019          This service was not originating from a related E/M service provided within the previous 7 days nor will  to an E/M service or procedure within the next 24 hours or my soonest available appointment.  Prevailing standard of care was able to be met in this audio-only visit.

## 2020-08-03 NOTE — TELEPHONE ENCOUNTER
----- Message from Kip Dang sent at 8/3/2020 11:42 AM CDT -----  Regarding: Appt  Contact: Patient [phone]  Appt today Phone Virtual     Calling to check the status set for 8/03/20 11:30    Please call an advise  Thank you

## 2020-08-04 ENCOUNTER — TELEPHONE (OUTPATIENT)
Dept: INTERNAL MEDICINE | Facility: CLINIC | Age: 70
End: 2020-08-04

## 2020-08-04 NOTE — TELEPHONE ENCOUNTER
----- Message from Geno Tobar sent at 8/4/2020  3:33 PM CDT -----  Contact: [facility]/kavitha/[phone]  [facility] called in regards to talking to the dr about a level 1 severity contraindisation with two medications. She would like a call back,     Please advise

## 2020-08-04 NOTE — TELEPHONE ENCOUNTER
Returned call to Stella with [facility]. No answer. Left message to call back to discuss.

## 2020-08-05 NOTE — TELEPHONE ENCOUNTER
Spoke to Stella with [facility]. She stated that they got a drug interaction between gemfibrozil and rosuvastatin. That they should not be given to the same pt.   Please advise.

## 2020-08-06 PROCEDURE — G0179 PR HOME HEALTH MD RECERTIFICATION: ICD-10-PCS | Mod: ,,, | Performed by: UROLOGY

## 2020-08-06 PROCEDURE — G0179 MD RECERTIFICATION HHA PT: HCPCS | Mod: ,,, | Performed by: UROLOGY

## 2020-08-07 NOTE — TELEPHONE ENCOUNTER
Spoke to Stella with [facility]. I advised that per Dr. Cancino, ok for pt to continue both medications. Aware of risk.

## 2020-08-07 NOTE — TELEPHONE ENCOUNTER
Spoke to Gabriela with [facility]. She stated that Stella was not available. Requested that I call back.

## 2020-08-13 ENCOUNTER — TELEPHONE (OUTPATIENT)
Dept: INTERNAL MEDICINE | Facility: CLINIC | Age: 70
End: 2020-08-13

## 2020-08-13 ENCOUNTER — LAB VISIT (OUTPATIENT)
Dept: LAB | Facility: HOSPITAL | Age: 70
End: 2020-08-13
Attending: INTERNAL MEDICINE
Payer: MEDICARE

## 2020-08-13 DIAGNOSIS — I15.2 ENDOCRINE HYPERTENSION: Primary | ICD-10-CM

## 2020-08-13 LAB
ANION GAP SERPL CALC-SCNC: 7 MMOL/L (ref 8–16)
BUN SERPL-MCNC: 29 MG/DL (ref 8–23)
CALCIUM SERPL-MCNC: 8.6 MG/DL (ref 8.7–10.5)
CHLORIDE SERPL-SCNC: 102 MMOL/L (ref 95–110)
CHOLEST SERPL-MCNC: 166 MG/DL (ref 120–199)
CHOLEST/HDLC SERPL: 3.9 {RATIO} (ref 2–5)
CO2 SERPL-SCNC: 25 MMOL/L (ref 23–29)
CREAT SERPL-MCNC: 1.8 MG/DL (ref 0.5–1.4)
EST. GFR  (AFRICAN AMERICAN): 32.9 ML/MIN/1.73 M^2
EST. GFR  (NON AFRICAN AMERICAN): 28.5 ML/MIN/1.73 M^2
ESTIMATED AVG GLUCOSE: 203 MG/DL (ref 68–131)
GLUCOSE SERPL-MCNC: 125 MG/DL (ref 70–110)
HBA1C MFR BLD HPLC: 8.7 % (ref 4–5.6)
HDLC SERPL-MCNC: 43 MG/DL (ref 40–75)
HDLC SERPL: 25.9 % (ref 20–50)
LDLC SERPL CALC-MCNC: 96.6 MG/DL (ref 63–159)
NONHDLC SERPL-MCNC: 123 MG/DL
POTASSIUM SERPL-SCNC: 4.4 MMOL/L (ref 3.5–5.1)
SODIUM SERPL-SCNC: 134 MMOL/L (ref 136–145)
TRIGL SERPL-MCNC: 132 MG/DL (ref 30–150)
TSH SERPL DL<=0.005 MIU/L-ACNC: 1.96 UIU/ML (ref 0.4–4)

## 2020-08-13 PROCEDURE — 84443 ASSAY THYROID STIM HORMONE: CPT

## 2020-08-13 PROCEDURE — 80061 LIPID PANEL: CPT

## 2020-08-13 PROCEDURE — 83036 HEMOGLOBIN GLYCOSYLATED A1C: CPT

## 2020-08-13 PROCEDURE — 80048 BASIC METABOLIC PNL TOTAL CA: CPT

## 2020-08-13 NOTE — TELEPHONE ENCOUNTER
If she could see Cecille at end of her morning, I could see her 12:30rosa  Don't think she's an early morning person or could see her 8:30ish then have Cecille see her

## 2020-08-13 NOTE — TELEPHONE ENCOUNTER
Spoke to pt. Pt stated that she is seeing the Diabetic Educator, Mrs. Gabriel, NP at . She stated that she had a virtual visit with her last week.   I advised that she communicate with her on today's a1c level.  Pt ok with this.

## 2020-08-13 NOTE — TELEPHONE ENCOUNTER
----- Message from Lurdes Navarro MD sent at 8/13/2020  1:36 PM CDT -----  Please review your lab work and we will discuss at your pending office visit.  Lurdes Cancino

## 2020-08-13 NOTE — TELEPHONE ENCOUNTER
Okay, as long as she is being followed by [facility], her A1C is not improving , I'll send to Ms. Gabriel

## 2020-08-13 NOTE — TELEPHONE ENCOUNTER
Sent pt Jonathan message to review her lab prior to appt , A1C elevated    She has seen Diabetes at , but she has difficulty w/ transportation    Wonder if she'd like to see Cecille the same day she sees me in 9/2020  Please see if we can facilitate

## 2020-08-13 NOTE — TELEPHONE ENCOUNTER
Pt cannot be scheduled on 9/13/20 when she comes to the office. Visit is in the afternoon. Tanja not seeing pts in the afternoon.  Please advise.

## 2020-08-20 ENCOUNTER — PATIENT OUTREACH (OUTPATIENT)
Dept: ADMINISTRATIVE | Facility: OTHER | Age: 70
End: 2020-08-20

## 2020-08-20 ENCOUNTER — DOCUMENT SCAN (OUTPATIENT)
Dept: HOME HEALTH SERVICES | Facility: HOSPITAL | Age: 70
End: 2020-08-20
Payer: COMMERCIAL

## 2020-08-20 RX ORDER — LANCETS
EACH MISCELLANEOUS
Qty: 50 EACH | Refills: 6 | Status: SHIPPED | OUTPATIENT
Start: 2020-08-20 | End: 2022-07-21

## 2020-08-20 RX ORDER — LANCETS
EACH MISCELLANEOUS
Qty: 50 EACH | Refills: 6 | Status: SHIPPED | OUTPATIENT
Start: 2020-08-20 | End: 2020-08-20

## 2020-08-20 NOTE — ADDENDUM NOTE
Addended by: KAYLYNN ALFARO on: 8/20/2020 04:18 PM     Modules accepted: Orders     Detail Level: Zone Plan: Take Benadryl 25mg one every 6-8 hours cold compress,

## 2020-08-21 NOTE — PROGRESS NOTES
Chart reviewed.   Immunizations: Triggered Imm Registry     Orders placed: n/a  Upcoming appts to satisfy MALICK topics: n/a

## 2020-08-25 ENCOUNTER — TELEPHONE (OUTPATIENT)
Dept: PHARMACY | Facility: CLINIC | Age: 70
End: 2020-08-25

## 2020-08-28 RX ORDER — AMLODIPINE BESYLATE 10 MG/1
10 TABLET ORAL DAILY
Qty: 90 TABLET | Refills: 1 | Status: SHIPPED | OUTPATIENT
Start: 2020-08-28 | End: 2021-06-30

## 2020-08-28 RX ORDER — BUTALBITAL, ACETAMINOPHEN AND CAFFEINE 50; 325; 40 MG/1; MG/1; MG/1
TABLET ORAL
Qty: 10 TABLET | Refills: 0 | OUTPATIENT
Start: 2020-08-28

## 2020-08-28 NOTE — TELEPHONE ENCOUNTER
Denied Fioricet- pt last refilled 8/10, she is to take no more than 10/30 days per Neurology rec  Refilled BP med

## 2020-08-28 NOTE — TELEPHONE ENCOUNTER
Spoke to pt. Pt stated that she did not request this refill. She stated that the pharmacy requests refills about 2 weeks ahead of time. She stated that she is aware that it is too early. She will contact her pharmacy when she is ready.

## 2020-09-02 ENCOUNTER — OFFICE VISIT (OUTPATIENT)
Dept: NEPHROLOGY | Facility: CLINIC | Age: 70
End: 2020-09-02
Payer: MEDICARE

## 2020-09-02 VITALS
BODY MASS INDEX: 43.8 KG/M2 | HEART RATE: 79 BPM | OXYGEN SATURATION: 97 % | HEIGHT: 68 IN | SYSTOLIC BLOOD PRESSURE: 132 MMHG | DIASTOLIC BLOOD PRESSURE: 80 MMHG | WEIGHT: 289 LBS

## 2020-09-02 DIAGNOSIS — N25.81 SECONDARY HYPERPARATHYROIDISM, RENAL: ICD-10-CM

## 2020-09-02 DIAGNOSIS — N31.9 NEUROGENIC BLADDER: ICD-10-CM

## 2020-09-02 DIAGNOSIS — R06.02 SOB (SHORTNESS OF BREATH): ICD-10-CM

## 2020-09-02 DIAGNOSIS — G43.909 MIXED MIGRAINE AND MUSCLE CONTRACTION HEADACHE: ICD-10-CM

## 2020-09-02 DIAGNOSIS — E55.9 VITAMIN D DEFICIENCY DISEASE: ICD-10-CM

## 2020-09-02 DIAGNOSIS — I10 ESSENTIAL HYPERTENSION: ICD-10-CM

## 2020-09-02 DIAGNOSIS — G47.33 OSA (OBSTRUCTIVE SLEEP APNEA): ICD-10-CM

## 2020-09-02 DIAGNOSIS — M79.7 FIBROMYALGIA: ICD-10-CM

## 2020-09-02 DIAGNOSIS — N18.4 CKD STAGE 4 DUE TO TYPE 2 DIABETES MELLITUS: ICD-10-CM

## 2020-09-02 DIAGNOSIS — N18.4 CKD (CHRONIC KIDNEY DISEASE) STAGE 4, GFR 15-29 ML/MIN: Primary | ICD-10-CM

## 2020-09-02 DIAGNOSIS — E87.20 METABOLIC ACIDOSIS: ICD-10-CM

## 2020-09-02 DIAGNOSIS — D50.8 OTHER IRON DEFICIENCY ANEMIA: ICD-10-CM

## 2020-09-02 DIAGNOSIS — Z74.09 MOBILITY IMPAIRED: ICD-10-CM

## 2020-09-02 DIAGNOSIS — R26.9 ABNORMALITY OF GAIT AND MOBILITY: ICD-10-CM

## 2020-09-02 DIAGNOSIS — G89.29 OTHER CHRONIC PAIN: ICD-10-CM

## 2020-09-02 DIAGNOSIS — E11.22 CKD STAGE 4 DUE TO TYPE 2 DIABETES MELLITUS: ICD-10-CM

## 2020-09-02 DIAGNOSIS — E66.01 MORBID OBESITY DUE TO EXCESS CALORIES: ICD-10-CM

## 2020-09-02 DIAGNOSIS — E03.9 ACQUIRED HYPOTHYROIDISM: ICD-10-CM

## 2020-09-02 DIAGNOSIS — N39.0 RECURRENT UTI: ICD-10-CM

## 2020-09-02 DIAGNOSIS — Z93.59 CHRONIC SUPRAPUBIC CATHETER: ICD-10-CM

## 2020-09-02 DIAGNOSIS — G44.209 MIXED MIGRAINE AND MUSCLE CONTRACTION HEADACHE: ICD-10-CM

## 2020-09-02 DIAGNOSIS — R33.9 URINARY RETENTION: ICD-10-CM

## 2020-09-02 DIAGNOSIS — E78.2 MIXED HYPERLIPIDEMIA: ICD-10-CM

## 2020-09-02 PROCEDURE — 99999 PR PBB SHADOW E&M-EST. PATIENT-LVL III: ICD-10-PCS | Mod: PBBFAC,,, | Performed by: INTERNAL MEDICINE

## 2020-09-02 PROCEDURE — 99999 PR PBB SHADOW E&M-EST. PATIENT-LVL III: CPT | Mod: PBBFAC,,, | Performed by: INTERNAL MEDICINE

## 2020-09-02 PROCEDURE — 99214 OFFICE O/P EST MOD 30 MIN: CPT | Mod: S$PBB,,, | Performed by: INTERNAL MEDICINE

## 2020-09-02 PROCEDURE — 99214 PR OFFICE/OUTPT VISIT, EST, LEVL IV, 30-39 MIN: ICD-10-PCS | Mod: S$PBB,,, | Performed by: INTERNAL MEDICINE

## 2020-09-02 PROCEDURE — 99213 OFFICE O/P EST LOW 20 MIN: CPT | Mod: PBBFAC,PO | Performed by: INTERNAL MEDICINE

## 2020-09-02 RX ORDER — CLONIDINE HYDROCHLORIDE 0.1 MG/1
TABLET ORAL
Qty: 90 TABLET | Refills: 1 | Status: SHIPPED | OUTPATIENT
Start: 2020-09-02 | End: 2020-09-24

## 2020-09-02 NOTE — PROGRESS NOTES
Subjective:       Patient ID: Cecile Bowen is a 70 y.o. White female who presents for follow-up evaluation of Chronic Kidney Disease    HPI     She reports she is feeling OK except her Hba1c jumped up to 12.1 from 7.5.  Her mobility remains poor and she continues to use a scooter. Her HAs are at bay. No cloudy urine. LE edema is about the same. No NSAID use, uses morphine derivatives for pain. 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 Her insurance has improved a new migraine HA medication and she is quire relieved. She uses clonidine. about 2X week for elevated BP. Most of the time she has a HA. No change in LE edema.     Interval history June 2019: She notes SOB, at rest and on exertion. She is on new treatment for migraines which has helped tremendously and her BP is much better controlled. Her Hba1c has dropped to 6.7. She is going on a cruise in the fall    Interval history September 2020: She is late to follow up due to weather and ill cancellations. She is feeling OK. Last Hba1c was 8.7. No new medications. She needs a refill on clonidine but still needs it rarely.       Review of Systems   Constitutional: Negative for activity change, appetite change, fatigue and fever.   HENT: Negative for facial swelling.    Eyes: Negative for visual disturbance.   Respiratory: Negative for shortness of breath.    Cardiovascular: Positive for leg swelling. Negative for chest pain.   Gastrointestinal: Negative for constipation and diarrhea.   Genitourinary: Negative for difficulty urinating, dysuria and hematuria.   Musculoskeletal: Positive for arthralgias and back pain.   Neurological: Negative for weakness and headaches.   Psychiatric/Behavioral: Negative for sleep disturbance.       Objective:      Physical Exam  Vitals signs reviewed.   Neck:      Vascular: No JVD.   Cardiovascular:      Heart sounds: S1 normal and S2 normal. No friction rub.   Pulmonary:       Breath sounds: Normal breath sounds. No wheezing or rales.   Abdominal:      Palpations: Abdomen is soft.   Skin:     General: Skin is warm and dry.   Neurological:      Mental Status: She is alert and oriented to person, place, and time.         Assessment:       1. CKD (chronic kidney disease) stage 4, GFR 15-29 ml/min    2. Secondary hyperparathyroidism, renal    3. Other iron deficiency anemia    4. Uncontrolled type 2 diabetes mellitus with chronic kidney disease, with long-term current use of insulin    5. SOB (shortness of breath)    6. Chronic suprapubic catheter    7. Metabolic acidosis    8. Mixed migraine and muscle contraction headache    9. Essential hypertension    10. Vitamin D deficiency disease    11. Morbid obesity due to excess calories    12. VIJAYA (obstructive sleep apnea)    13. Mixed hyperlipidemia    14. Acquired hypothyroidism    15. Mobility impaired    16. Abnormality of gait and mobility    17. Fibromyalgia    18. Neurogenic bladder    19. Urinary retention    20. CKD stage 4 due to type 2 diabetes mellitus    21. Other chronic pain    22. Recurrent UTI        Plan:           CKD Stage 4 with stable kidney function. Proteinuria not done. Not on a RAAS blocker, but potassium may preclude this as well as baseline BP    Mineral and Bone Disease--PTH was at goal. Continue calcitriol and D3. She is on exogenous bicarbonate for metabolic acidosis and bicarb is at goal    HTN is controlled     Suprapubic catheter status--continue Urology follow up    DM--remains above goal     Morbid Obesity--consider bariatric surgery      RTC 4 months

## 2020-09-04 ENCOUNTER — PATIENT OUTREACH (OUTPATIENT)
Dept: HOME HEALTH SERVICES | Facility: HOSPITAL | Age: 70
End: 2020-09-04

## 2020-09-09 ENCOUNTER — EXTERNAL HOME HEALTH (OUTPATIENT)
Dept: HOME HEALTH SERVICES | Facility: HOSPITAL | Age: 70
End: 2020-09-09
Payer: MEDICARE

## 2020-09-16 ENCOUNTER — PATIENT MESSAGE (OUTPATIENT)
Dept: PHYSICAL MEDICINE AND REHAB | Facility: CLINIC | Age: 70
End: 2020-09-16

## 2020-09-16 ENCOUNTER — OFFICE VISIT (OUTPATIENT)
Dept: INTERNAL MEDICINE | Facility: CLINIC | Age: 70
End: 2020-09-16
Payer: MEDICARE

## 2020-09-16 DIAGNOSIS — C50.412 MALIGNANT NEOPLASM OF UPPER-OUTER QUADRANT OF LEFT BREAST IN FEMALE, ESTROGEN RECEPTOR POSITIVE: ICD-10-CM

## 2020-09-16 DIAGNOSIS — E11.69 HYPERLIPIDEMIA ASSOCIATED WITH TYPE 2 DIABETES MELLITUS: ICD-10-CM

## 2020-09-16 DIAGNOSIS — M54.2 CHRONIC NECK PAIN: ICD-10-CM

## 2020-09-16 DIAGNOSIS — N18.4 CKD STAGE 4 DUE TO TYPE 2 DIABETES MELLITUS: ICD-10-CM

## 2020-09-16 DIAGNOSIS — G89.29 CHRONIC MIDLINE LOW BACK PAIN WITHOUT SCIATICA: ICD-10-CM

## 2020-09-16 DIAGNOSIS — E78.5 HYPERLIPIDEMIA ASSOCIATED WITH TYPE 2 DIABETES MELLITUS: ICD-10-CM

## 2020-09-16 DIAGNOSIS — I15.2 HYPERTENSION ASSOCIATED WITH DIABETES: Primary | ICD-10-CM

## 2020-09-16 DIAGNOSIS — G43.909 MIXED MIGRAINE AND MUSCLE CONTRACTION HEADACHE: ICD-10-CM

## 2020-09-16 DIAGNOSIS — M79.7 FIBROMYALGIA: ICD-10-CM

## 2020-09-16 DIAGNOSIS — E11.22 CKD STAGE 4 DUE TO TYPE 2 DIABETES MELLITUS: ICD-10-CM

## 2020-09-16 DIAGNOSIS — G44.209 MIXED MIGRAINE AND MUSCLE CONTRACTION HEADACHE: ICD-10-CM

## 2020-09-16 DIAGNOSIS — G89.29 CHRONIC NECK PAIN: ICD-10-CM

## 2020-09-16 DIAGNOSIS — E11.59 HYPERTENSION ASSOCIATED WITH DIABETES: Primary | ICD-10-CM

## 2020-09-16 DIAGNOSIS — M54.50 CHRONIC MIDLINE LOW BACK PAIN WITHOUT SCIATICA: ICD-10-CM

## 2020-09-16 DIAGNOSIS — Z17.0 MALIGNANT NEOPLASM OF UPPER-OUTER QUADRANT OF LEFT BREAST IN FEMALE, ESTROGEN RECEPTOR POSITIVE: ICD-10-CM

## 2020-09-16 PROCEDURE — 99442 PR PHYSICIAN TELEPHONE EVALUATION 11-20 MIN: CPT | Mod: 95,,, | Performed by: INTERNAL MEDICINE

## 2020-09-16 PROCEDURE — 99442 PR PHYSICIAN TELEPHONE EVALUATION 11-20 MIN: ICD-10-PCS | Mod: 95,,, | Performed by: INTERNAL MEDICINE

## 2020-09-16 RX ORDER — METHOCARBAMOL 750 MG/1
750-1500 TABLET, FILM COATED ORAL 3 TIMES DAILY PRN
Qty: 180 TABLET | Refills: 1 | Status: SHIPPED | OUTPATIENT
Start: 2020-09-16 | End: 2020-10-14 | Stop reason: SDUPTHER

## 2020-09-16 NOTE — PROGRESS NOTES
Telemedicine Virtual Visit    The patient location is:  Patient Home   The chief complaint leading to consultation is:  F/up chronic medical problems  Visit type: Virtual visit with synchronous audio and video  Total time spent with patient: 20  Each patient to whom he or she provides medical services by telemedicine is:  (1) informed of the relationship between the physician and patient and the respective role of any other health care provider with respect to management of the patient; and (2) notified that he or she may decline to receive medical services by telemedicine and may withdraw from such care at any time.           70 y.o. 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   On 08/01/2019, she underwent a left   modified radical mastectomy with sentinel lymph node biopsy.  The pathology   report from the procedure indicates that she had a 1.2 cm grade 1 carcinoma.    Resection margins were clear and white.  With one of two sentinel   lymph nodes + micrometastases.    Tx: Arimidex    DM w/ neuro/renal, tx w/ lantus  24 U And Humalog  12 breakfast and lunch and 12 u w/ dinner and SSI  Denied increased thirst, urination, or hypoglycemia episodes  Pt reported increased pain and decreased activities  A1C ==>8.7    HTN, tx amlodipine 10mg, clonidine 0.1,and  propranolol 80mg LA  Denied SOB or chest pain  BP ==>132/80    Dyslipidemia tx w/gemfibrozil 60mg BID   and rosuvastatin 20mg  Denied unusual muscle pain or chest pain  LDL==>96.6    CKD, IV  EGFR==>28.5  Cr==> 1.8  Denied decreased urination      Sleeps in recliner thinks helps back   Insomnia,  tx trazodone 100   Back spasms for 2 nights, tx muscle relaxors  Hypocalcemia- 8.6    HA, tx Aimovig 140 monthly  Decreased intensity, occ only mild HA  Excedrin Migraine helps, occ caffeine soda helps  Imitrex-  for severe HA  Fioricet from this office and not to use > 10/30days  HA this morning, w/ dry heaves, took sumatriptan w/o relief       ROS:  No fever, chills, or night sweats  No blurry vision or visual disturbance  No dysphagia or early satiety  No palpitations or tachycardia  No cough or wheezing  No GERD or abdominal pain  No numbness or focal deficits  + back pain s/p BM,   Remainder of review negative except as previously noted    PAST MEDICAL HX: Reviewed  PAST SURGICAL HX: Reviewed  SOCIAL HX: Reviewed  FAMILY HX: Reviewed    PE: AUDIO ONLY      IMPRESSION:  HTN, assoc DM, stable @ last appt w/ Nephrology  HA, tension/MSK/migraine, improved w/ new RX  DM w/ CKD 4 w/ insulin, eGFR improved kidney fx  HLD assoc DM  Fibromyalgia, chronic  Breast cancer, s/p left mastectomy w/ 2ndary ds  Inflammatory spondylopathy , lumbar, rate limiting   back pain s/p BM, and difficulty cleaning self- consider Sitz Bath or perhaps bedside commode    PLAN:  Rec flu vaccine  Continue present meds  Keep well hydrated - water best  Reviewed flu vaccine  Call prn  RTC 6 mos EPP

## 2020-09-21 ENCOUNTER — PATIENT MESSAGE (OUTPATIENT)
Dept: NEUROLOGY | Facility: CLINIC | Age: 70
End: 2020-09-21

## 2020-09-22 ENCOUNTER — TELEPHONE (OUTPATIENT)
Dept: PHARMACY | Facility: CLINIC | Age: 70
End: 2020-09-22

## 2020-09-29 ENCOUNTER — PATIENT MESSAGE (OUTPATIENT)
Dept: OTHER | Facility: OTHER | Age: 70
End: 2020-09-29

## 2020-09-29 ENCOUNTER — PATIENT OUTREACH (OUTPATIENT)
Dept: ADMINISTRATIVE | Facility: OTHER | Age: 70
End: 2020-09-29

## 2020-09-30 ENCOUNTER — TELEPHONE (OUTPATIENT)
Dept: PHYSICAL MEDICINE AND REHAB | Facility: CLINIC | Age: 70
End: 2020-09-30

## 2020-09-30 NOTE — TELEPHONE ENCOUNTER
----- Message from Teena Sanchez sent at 9/30/2020  8:18 AM CDT -----  Contact: self @ [phone]  Pt is scheduled to f/u with Dr Stafford on tomorrow.  She is calling to see if her appt can be changed ton an Audio Visit.  Pls call.

## 2020-10-01 ENCOUNTER — OFFICE VISIT (OUTPATIENT)
Dept: PHYSICAL MEDICINE AND REHAB | Facility: CLINIC | Age: 70
End: 2020-10-01
Payer: MEDICARE

## 2020-10-01 ENCOUNTER — TELEPHONE (OUTPATIENT)
Dept: PHYSICAL MEDICINE AND REHAB | Facility: CLINIC | Age: 70
End: 2020-10-01

## 2020-10-01 DIAGNOSIS — E66.01 MORBID OBESITY WITH BMI OF 40.0-44.9, ADULT: ICD-10-CM

## 2020-10-01 DIAGNOSIS — M47.816 SPONDYLOSIS OF LUMBAR REGION WITHOUT MYELOPATHY OR RADICULOPATHY: ICD-10-CM

## 2020-10-01 DIAGNOSIS — M47.812 SPONDYLOSIS OF CERVICAL REGION WITHOUT MYELOPATHY OR RADICULOPATHY: ICD-10-CM

## 2020-10-01 DIAGNOSIS — M54.2 CHRONIC NECK PAIN: ICD-10-CM

## 2020-10-01 DIAGNOSIS — G56.03 BILATERAL CARPAL TUNNEL SYNDROME: ICD-10-CM

## 2020-10-01 DIAGNOSIS — M54.6 CHRONIC MIDLINE THORACIC BACK PAIN: ICD-10-CM

## 2020-10-01 DIAGNOSIS — M79.7 FIBROMYALGIA: ICD-10-CM

## 2020-10-01 DIAGNOSIS — G89.29 CHRONIC MIDLINE THORACIC BACK PAIN: ICD-10-CM

## 2020-10-01 DIAGNOSIS — G89.29 CHRONIC MIDLINE LOW BACK PAIN WITHOUT SCIATICA: Primary | ICD-10-CM

## 2020-10-01 DIAGNOSIS — M94.0 COSTOCHONDRITIS: ICD-10-CM

## 2020-10-01 DIAGNOSIS — Z79.891 CHRONIC USE OF OPIATE FOR THERAPEUTIC PURPOSE: ICD-10-CM

## 2020-10-01 DIAGNOSIS — M54.50 CHRONIC MIDLINE LOW BACK PAIN WITHOUT SCIATICA: Primary | ICD-10-CM

## 2020-10-01 DIAGNOSIS — G89.29 CHRONIC NECK PAIN: ICD-10-CM

## 2020-10-01 PROCEDURE — 99442 PR PHYSICIAN TELEPHONE EVALUATION 11-20 MIN: ICD-10-PCS | Mod: 95,,, | Performed by: PHYSICAL MEDICINE & REHABILITATION

## 2020-10-01 PROCEDURE — 99442 PR PHYSICIAN TELEPHONE EVALUATION 11-20 MIN: CPT | Mod: 95,,, | Performed by: PHYSICAL MEDICINE & REHABILITATION

## 2020-10-01 NOTE — PROGRESS NOTES
Subjective:       Patient ID: Cecile Bowen is a 70 y.o. female.      A telemedicine Audio Visit is being done today (due to the COVID-19 restrictions).     The patient location is: home  The chief complaint leading to consultation is: back pain, neck pain.  Visit type: Virtual visit with synchronous audio  Total time spent with patient: 15 min  Each patient to whom he or she provides medical services by telemedicine is:  (1) informed of the relationship between the physician and patient and the respective role of any other health care provider with respect to management of the patient; and (2) notified that he or she may decline to receive medical services by telemedicine and may withdraw from such care at any time.  This service was not originating from a related E/M service provided within the previous 7 days nor will  to an E/M service or procedure within the next 24 hours or my soonest available appointment.  Prevailing standard of care was able to be met in this audio-only visit.      Chief Complaint: No chief complaint on file.    HPI     HISTORY OF PRESENT ILLNESS:  Ms. Bowen is a 70-year-old white female with past medical history of HTN, DM, CKD, hypothyroidism, fibromyalgia, status post left mastectomy in 08/2019, morbid obesity (BMI in the 40s) and OA.  She is followed up in  [facility] for chronic low back pain due to DJD, status post multiple procedures including RFAs and ESIs, chronic thoracic pain, chronic neck pain and bilateral carpal tunnel syndrome.  Her last visit was on 5/28/2020 (a telemedicine audio visit due to COVID-19). She was maintained on venlafaxine, p.r.n. Hydrocodone/APAP, p.r.n. Methocarbamol and diclofenac gel.     The patient's back pain has been stable with occasional flare ups.  It is a constant aching pain in the lumbar spine and across her back.  She has occasional muscle spasms I her back. She denies any radiation to her legs.  Her maximum pain is  9/10 and minimum 5/10.  Today, it is 7/10.  Her bilateral lower extremity weakness has been worse.  She has chronic bowel urgency.  She is status post suprapubic catheter due to neurogenic bladder.    Her neck pain has been much better.  It is an intermittent aching pain in the cervical spine.  She denies any radiation to her arms.  Her maximum pain is 1-2/10 and minimum 0/10.  Today, it is 0/10.  The patient denies any upper extremity weakness.  Her bilateral hand numbness has been under good control.        She continues to complain of generalized aching, fatigue and stiffness due to fibromyalgia.      She is currently taking:  - venlafaxine XR 75 mg capsules, 3 capsules once per day  - hydrocodone/APAP 10/325 p.r.n. four times per day  - methocarbamol 750 mg p.o. p.r.n., usually 1-2 tablets three times per day  - Voltaren gel topically for painful joints, usually 3-4 daily      Past Medical History:   Diagnosis Date    Cervicogenic migraine     Chronic pain     CKD (chronic kidney disease) stage 4, GFR 15-29 ml/min     [facility]    CKD (chronic kidney disease) stage 4, GFR 15-29 ml/min     Diabetes mellitus     Long term use of Insulin, Diabetic Neuropathy    Fibromyalgia     Hydronephrosis     Hyperlipidemia     Hypertension 12/12/2012    Hypothyroidism 12/12/2012    ARON (iron deficiency anemia)     Insomnia     Levoscoliosis     Malignant neoplasm of upper-outer quadrant of left breast in female, estrogen receptor positive     Metabolic acidosis     Mobility impaired     Nuclear sclerosis - Both Eyes 3/24/2014    VIJAYA (obstructive sleep apnea)     Osteopenia     Pulmonary nodule     Recurrent UTI     Renal manifestation of secondary diabetes mellitus     Secondary hyperparathyroidism, renal     Urinary retention          Review of Systems   Constitutional: Positive for fatigue. Negative for chills and fever.   Eyes: Negative for visual disturbance.   Respiratory: Negative for shortness  of breath.    Cardiovascular: Positive for chest pain (sternum).   Gastrointestinal: Negative for abdominal pain, constipation, nausea and vomiting.   Genitourinary:        S/p suprapubic catheter   Musculoskeletal: Positive for arthralgias, back pain, gait problem, neck pain and neck stiffness. Negative for joint swelling.   Neurological: Positive for weakness and headaches. Negative for dizziness.   Psychiatric/Behavioral: Positive for sleep disturbance. Negative for behavioral problems.       Objective:      Physical Exam      N/A    BUE:  The patient reports functional range of motion and good strength .    BLE:  The patient reports functional range of motion and mild leg weakness .    Assessment:       1. Chronic midline low back pain without sciatica    2. Spondylosis of lumbar region without myelopathy or radiculopathy    3. Chronic midline thoracic back pain    4. Chronic neck pain    5. Spondylosis of cervical region without myelopathy or radiculopathy    6. Fibromyalgia    7. Bilateral carpal tunnel syndrome    8. Costochondritis    9. Morbid obesity with BMI of 40.0-44.9, adult    10. Chronic use of opiate for therapeutic purpose        Plan:       - NSAIDs will be avoided due to CKD.  - Continue venlafaxine -XR 75 mg capsule, 3 capsules (225 mg total) once daily (for depression but should help with arthritic pain and fibromyalgia).  - Continue HYDROcodone-acetaminophen (NORCO)  mg per tablet; Take 1 tablet by mouth every 6 (six) hours as needed.  - Continue methocarbamol (ROBAXIN) 750 MG Tab; Take 1-2 tablets (750-1,500 mg total) by mouth 3 (three) times daily as needed.  - Continue diclofenac sodium (VOLTAREN) 1 % Gel; Apply 2 g topically 3-4 times daily p.r.n. (to costochondral areas).  - Regular home exercise program was encouraged.  - Follow up in about 4 months (around 2/1/2021).       This note was partly generated with [employer] voice recognition software. I apologize for any possible  typographical errors.

## 2020-10-01 NOTE — TELEPHONE ENCOUNTER
Message patient on yesterday that her appointment for today was rescheduled per her request to Audio Visit.

## 2020-10-01 NOTE — PATIENT INSTRUCTIONS
Neck/Back Pain [General]    Both neck and back pain are usually caused by injury to the muscles or ligaments of the spine. Sometimes the disks that separate each bone of the spine may cause pain by putting pressure on a nearby nerve. Back and neck pain may appear after a sudden twisting/bending force (such as in a car accident), or sometimes after a simple awkward movement. In either case, muscle spasm is often present and adds to the pain.  Acute neck and back pain usually gets better in one to two weeks. Pain related to disk disease, arthritis in the spinal joints or spinal stenosis (narrowing of the spinal canal) can become chronic and last for months or years.  Home Care:  · FOR NECK PAIN: Use a comfortable pillow that supports the head and keeps the spine in a neutral position. The position of the head should not be tilted forward or backward.  · FOR BACK PAIN: You may need to stay in bed the first few days. But, as soon as possible, begin sitting or walking to avoid problems with prolonged bed rest (muscle weakness, worsening back stiffness and pain, blood clots in the legs).  · When in bed, try to find a position of comfort. A firm mattress is best. Try lying flat on your back with pillows under your knees. You can also try lying on your side with your knees bent up towards your chest and a pillow between your knees.  · Avoid prolonged sitting. This puts more stress on the lower back than standing or walking.  · During the first two days after injury, apply an ICE PACK to the painful area for 20 minutes every 2-4 hours. This will reduce swelling and pain. HEAT (hot shower, hot bath or heating pad) works well for muscle spasm. You can start with ice, then switch to heat after two days. Some patients feel best alternating ice and heat treatments. Use the one method that feels the best to you.  · You may use acetaminophen (Tylenol) or ibuprofen (Motrin, Advil) to control pain, unless another pain medicine was  prescribed. [NOTE: If you have chronic liver or kidney disease or ever had a stomach ulcer or GI bleeding, talk with your doctor before using these medicines.]  · Be aware of safe lifting methods and do not lift anything over 15 pounds until all the pain is gone.  Follow Up  with your physician or this facility if your symptoms do not start to improve after one week. Physical therapy or further tests may be needed.  [NOTE: If X-rays were taken, they will be reviewed by a radiologist. You will be notified of any new findings that may affect your care.]  Get Prompt Medical Attention  if any of the following occur:  · Pain becomes worse or spreads into your arms or legs  · Weakness, numbness or pain in one or both arms or legs  · Loss of bowel or bladder control  · Numbness in the groin area  · Difficulty walking  · Fever of 100.4ºF (38ºC) or higher, or as directed by your healthcare provider  © 8655-1892 [facility], [address] [place] [address]. All rights reserved. This information is not intended as a substitute for professional medical care. Always follow your healthcare professional's instructions.    Back Exercises: Back Press  Do this exercise on your hands and knees. Keep your knees under your hips and your hands under your shoulders. Keep your spine in a neutral position (not arched or sagging). Be sure to maintain your necks natural curve.  · Tighten your abdominal and buttocks muscles to press your back upward. Let your head drop slightly.  · Hold for 5 seconds. Return to starting position.  · Repeat 5 times.     © 2349-8448 [facility], [address] [place] [address]. All rights reserved. This information is not intended as a substitute for professional medical care. Always follow your healthcare professional's instructions.    Back Exercises: Back Release  Do this exercise on your hands and knees. Keep your knees under your hips and your hands under your shoulders. Keep  your spine in a neutral position (not arched or sagging). Be sure to maintain your necks natural curve.  · Relax your abdominal and buttocks muscles, lift your head, and let your back sag. Be sure to keep your weight evenly distributed. Dont sit back on your hips.   · Hold for 5 seconds.  · Return to starting position.  · Repeat 5 times.  © 6275-3303 [facility] [place] [address]. All rights reserved. This information is not intended as a substitute for professional medical care. Always follow your healthcare professional's instructions.    Back Exercises: Bridge  The Bridge exercise strengthens your abdominal, buttocks, and hamstring muscles. This helps keep your back stable and aligned when you walk.  · Lie on the floor with your back and palms flat. Bend your knees. Keep your feet flat on the floor.  · Contract your abdominal and buttocks muscles. Slowly lift your buttocks off the floor until there is a straight line from your knees to your shoulders.  · Hold for 5  seconds. Repeat 10 times.    © 5057-1017 [place] [address]. All rights reserved. This information is not intended as a substitute for professional medical care. Always follow your healthcare professional's instructions.    Back Exercises: Elbow Press    To start, lie face down on your stomach, feet slightly apart, forehead on the floor. Breathe deeply. You should feel comfortable and relaxed in this position.  · Press up on your forearms. Keep your abdomen and hips on the floor.  · Hold for 20 seconds. Lower slowly.  · Repeat 2 times.  · Return to starting position.  © 0355-7346 [facility], [address] [place] [address]. All rights reserved. This information is not intended as a substitute for professional medical care. Always follow your healthcare professional's instructions.    Back Exercises: Pelvic Tilt  To start, lie on your back with your knees bent and  feet flat on the floor. Dont press your neck or lower back to the floor. Breathe deeply. You should feel comfortable and relaxed in this position.  · Tighten your abdomen and buttocks, and press your lower back toward the floor. This should be a small, subtle movement.  · Hold for 5 seconds. Release.  · Repeat 5 times.         © 3428-9380 [facility], [address] [place] [address]. All rights reserved. This information is not intended as a substitute for professional medical care. Always follow your healthcare professional's instructions.    Back Exercises: Partial Curl-Ups          To start, lie on your back with your knees bent and feet flat on the floor. Dont press your neck or lower back to the floor. Breathe deeply. You should feel comfortable and relaxed in this position.  · Cross your arms loosely.  · Tighten your abdomen and curl jail up, keeping your head in line with your shoulders.  · Hold for 5 seconds. Uncurl to lie down.  · Repeat 5 times.   © 5539-9792 [facility], [address] [place] [address]. All rights reserved. This information is not intended as a substitute for professional medical care. Always follow your healthcare professional's instructions.    Back Exercises: Lower Back Stretch                            To start, sit in a chair with your feet flat on the floor. Shift your weight slightly forward to avoid rounding your back. Relax, and keep your ears, shoulders, and hips aligned.  · Sit with your feet well apart.  · Bend forward and touch the floor with the backs of your hands. Relax and let your body drop.  · Hold for 20 seconds. Return to starting position.  · Repeat 2 times.   © 4136-1394 [facility], [address] [place] [address]. All rights reserved. This information is not intended as a substitute for professional medical care. Always follow your healthcare professional's instructions.    Back Exercises: Seated Rotation      To  start, sit in a chair with your feet flat on the floor. Shift your weight slightly forward to avoid rounding your back. Relax, and keep your ears, shoulders, and hips aligned.  · Fold your arms, elbows just below shoulder height.  · Turn from the waist with hips forward. Turn your head last.  · Hold for a count of 5. Return to starting position.  · Repeat 5 times on one side. Then switch sides.  © 2952-0122 [place] [address]. All rights reserved. This information is not intended as a substitute for professional medical care. Always follow your healthcare professional's instructions.    Back Exercises: Side Stretch  To start, sit in a chair with your feet flat on the floor. Shift your weight slightly forward to avoid rounding your back. Relax. Keep your ears, shoulders, and hips aligned.  · Stretch your right arm overhead.  · Slowly bend to the left. Dont twist your torso.  · Hold for 20 seconds. Return to starting position.  · Repeat 2 times. Then, switch to the other side.  © 8080-2854 [facility], [address] [place] [address]. All rights reserved. This information is not intended as a substitute for professional medical care. Always follow your healthcare professional's instructions      Neck/Back Pain [General]    Both neck and back pain are usually caused by injury to the muscles or ligaments of the spine. Sometimes the disks that separate each bone of the spine may cause pain by putting pressure on a nearby nerve. Back and neck pain may appear after a sudden twisting/bending force (such as in a car accident), or sometimes after a simple awkward movement. In either case, muscle spasm is often present and adds to the pain.  Acute neck and back pain usually gets better in one to two weeks. Pain related to disk disease, arthritis in the spinal joints or spinal stenosis (narrowing of the spinal canal) can become chronic and last for months or years.  Home  Care:  · FOR NECK PAIN: Use a comfortable pillow that supports the head and keeps the spine in a neutral position. The position of the head should not be tilted forward or backward.  · FOR BACK PAIN: You may need to stay in bed the first few days. But, as soon as possible, begin sitting or walking to avoid problems with prolonged bed rest (muscle weakness, worsening back stiffness and pain, blood clots in the legs).  · When in bed, try to find a position of comfort. A firm mattress is best. Try lying flat on your back with pillows under your knees. You can also try lying on your side with your knees bent up towards your chest and a pillow between your knees.  · Avoid prolonged sitting. This puts more stress on the lower back than standing or walking.  · During the first two days after injury, apply an ICE PACK to the painful area for 20 minutes every 2-4 hours. This will reduce swelling and pain. HEAT (hot shower, hot bath or heating pad) works well for muscle spasm. You can start with ice, then switch to heat after two days. Some patients feel best alternating ice and heat treatments. Use the one method that feels the best to you.  · You may use acetaminophen (Tylenol) or ibuprofen (Motrin, Advil) to control pain, unless another pain medicine was prescribed. [NOTE: If you have chronic liver or kidney disease or ever had a stomach ulcer or GI bleeding, talk with your doctor before using these medicines.]  · Be aware of safe lifting methods and do not lift anything over 15 pounds until all the pain is gone.  Follow Up  with your physician or this facility if your symptoms do not start to improve after one week. Physical therapy or further tests may be needed.  [NOTE: If X-rays were taken, they will be reviewed by a radiologist. You will be notified of any new findings that may affect your care.]  Get Prompt Medical Attention  if any of the following occur:  · Pain becomes worse or spreads into your arms or  legs  · Weakness, numbness or pain in one or both arms or legs  · Loss of bowel or bladder control  · Numbness in the groin area  · Difficulty walking  · Fever of 100.4ºF (38ºC) or higher, or as directed by your healthcare provider  © 6375-3395 [facility], [address] [place] [address]. All rights reserved. This information is not intended as a substitute for professional medical care. Always follow your healthcare professional's instructions.    Neck Exercises: Passive Neck Rotation          To start, lie on your back, knees bent and feet flat on the floor. Keep your ears, shoulders, and hips aligned, but dont press your lower back to the floor. Rest your hands on your pelvis. Breathe deeply and relax.  · With your neck relaxed, place the palm of one hand on your forehead. Use your hand to turn your head to one side until you feel a stretch in the neck muscles. Do not push through pain.  · Hold for 5 seconds. Then turn to the other side.  · Repeat 5 times on each side.   Note: Keep your shoulders on the floor. Dont lift your chin as you turn your head.  © 4925-3797 [facility], [address] [place] [address]. All rights reserved. This information is not intended as a substitute for professional medical care. Always follow your healthcare professional's instructions.    Exercises: Neck Isometrics  To start, sit in a chair with your feet flat on the floor. Your weight should be slightly forward so that youre balanced evenly on your buttocks. Relax your shoulders and keep your head level. Using a chair with arms may help you keep your balance.  1. Press your palm against your forehead. Resist with your neck muscles. Hold for 10 seconds. Relax. Repeat 5 times.  2. Do the exercise again, pressing on the side of your head. Repeat 5 times. Switch sides.  3. Do the exercise again, pressing on the back of your head. Repeat 5 times.          For your safety, check with your healthcare  provider before starting an exercise program.    © 2419-2581 [facility], [address] [place] [address]. All rights reserved. This information is not intended as a substitute for professional medical care. Always follow your healthcare professional's instructions.    Shoulder and Upper Back Stretch  To start, stand tall with your ears, shoulders, and hips in line. Your feet should be slightly apart, positioned just under your hips. Focus your eyes directly in front of you.  this position for a few seconds before starting your exercise. This helps increase your awareness of proper posture.  · Reach overhead and slightly back with both arms. Keep your shoulders and neck aligned and your elbows behind your shoulders.  · With your palms facing the ceiling, turn your fingers inward.  · Take a deep breath. Breathe out and lower your elbows toward your buttocks. Hold for 5 seconds, then return to starting position.  · Repeat 3 times.          © 6276-8662 [facility], [address] [place] [address]. All rights reserved. This information is not intended as a substitute for professional medical care. Always follow your healthcare professional's instructions.

## 2020-10-01 NOTE — TELEPHONE ENCOUNTER
----- Message from Crystal Bradford sent at 10/1/2020 12:58 PM CDT -----  Contact: Cecile Bowen  Patient stated she received a call and wanted to know if it was someone from the office. Patient contact number is [phone].

## 2020-10-05 PROCEDURE — G0179 PR HOME HEALTH MD RECERTIFICATION: ICD-10-PCS | Mod: ,,, | Performed by: INTERNAL MEDICINE

## 2020-10-05 PROCEDURE — G0179 MD RECERTIFICATION HHA PT: HCPCS | Mod: ,,, | Performed by: INTERNAL MEDICINE

## 2020-10-06 ENCOUNTER — EXTERNAL HOME HEALTH (OUTPATIENT)
Dept: HOME HEALTH SERVICES | Facility: HOSPITAL | Age: 70
End: 2020-10-06

## 2020-10-06 ENCOUNTER — PATIENT OUTREACH (OUTPATIENT)
Dept: HOME HEALTH SERVICES | Facility: HOSPITAL | Age: 70
End: 2020-10-06

## 2020-10-07 ENCOUNTER — EXTERNAL HOME HEALTH (OUTPATIENT)
Dept: HOME HEALTH SERVICES | Facility: HOSPITAL | Age: 70
End: 2020-10-07
Payer: MEDICARE

## 2020-10-13 ENCOUNTER — PATIENT MESSAGE (OUTPATIENT)
Dept: UROLOGY | Facility: CLINIC | Age: 70
End: 2020-10-13

## 2020-10-13 ENCOUNTER — PATIENT MESSAGE (OUTPATIENT)
Dept: PHYSICAL MEDICINE AND REHAB | Facility: CLINIC | Age: 70
End: 2020-10-13

## 2020-10-13 DIAGNOSIS — M79.7 FIBROMYALGIA: ICD-10-CM

## 2020-10-13 DIAGNOSIS — M54.50 CHRONIC MIDLINE LOW BACK PAIN WITHOUT SCIATICA: ICD-10-CM

## 2020-10-13 DIAGNOSIS — M54.2 CHRONIC NECK PAIN: ICD-10-CM

## 2020-10-13 DIAGNOSIS — G89.29 CHRONIC MIDLINE LOW BACK PAIN WITHOUT SCIATICA: ICD-10-CM

## 2020-10-13 DIAGNOSIS — G89.29 CHRONIC NECK PAIN: ICD-10-CM

## 2020-10-14 RX ORDER — HYDROCODONE BITARTRATE AND ACETAMINOPHEN 10; 325 MG/1; MG/1
1 TABLET ORAL EVERY 6 HOURS PRN
Qty: 120 TABLET | Refills: 0 | Status: SHIPPED | OUTPATIENT
Start: 2020-10-14 | End: 2020-11-13 | Stop reason: SDUPTHER

## 2020-10-14 RX ORDER — METHOCARBAMOL 750 MG/1
750-1500 TABLET, FILM COATED ORAL 3 TIMES DAILY PRN
Qty: 180 TABLET | Refills: 1 | Status: SHIPPED | OUTPATIENT
Start: 2020-10-14 | End: 2020-12-17 | Stop reason: SDUPTHER

## 2020-10-14 NOTE — TELEPHONE ENCOUNTER
Last Rx refill-----09/10/20-hydrocodone                             09/16/20-Methocarbamol  Last office visit--10/01/20  Next office visit--02/26/21

## 2020-10-19 ENCOUNTER — TELEPHONE (OUTPATIENT)
Dept: NEPHROLOGY | Facility: CLINIC | Age: 70
End: 2020-10-19

## 2020-10-21 ENCOUNTER — HOSPITAL ENCOUNTER (EMERGENCY)
Facility: HOSPITAL | Age: 70
Discharge: HOME | End: 2020-10-21
Attending: EMERGENCY MEDICINE
Payer: MEDICARE

## 2020-10-21 VITALS
SYSTOLIC BLOOD PRESSURE: 206 MMHG | HEART RATE: 79 BPM | BODY MASS INDEX: 43.95 KG/M2 | OXYGEN SATURATION: 98 % | TEMPERATURE: 99 F | HEIGHT: 68 IN | WEIGHT: 290 LBS | DIASTOLIC BLOOD PRESSURE: 92 MMHG | RESPIRATION RATE: 16 BRPM
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
## 2020-10-21 DIAGNOSIS — M79.605 PAIN OF LEFT LOWER EXTREMITY: Primary | ICD-10-CM

## 2020-10-21 LAB
ANION GAP SERPL CALC-SCNC: 11 MMOL/L (ref 8–16)
BUN SERPL-MCNC: 22 MG/DL (ref 8–23)
BUN SERPL-MCNC: 24 MG/DL (ref 6–30)
CALCIUM SERPL-MCNC: 9 MG/DL (ref 8.7–10.5)
CHLORIDE SERPL-SCNC: 102 MMOL/L (ref 95–110)
CHLORIDE SERPL-SCNC: 103 MMOL/L (ref 95–110)
CO2 SERPL-SCNC: 22 MMOL/L (ref 23–29)
CREAT SERPL-MCNC: 1.8 MG/DL (ref 0.5–1.4)
CREAT SERPL-MCNC: 1.8 MG/DL (ref 0.5–1.4)
EST. GFR  (AFRICAN AMERICAN): 32.9 ML/MIN/1.73 M^2
EST. GFR  (NON AFRICAN AMERICAN): 28.5 ML/MIN/1.73 M^2
GLUCOSE SERPL-MCNC: 164 MG/DL (ref 70–110)
GLUCOSE SERPL-MCNC: 172 MG/DL (ref 70–110)
HCT VFR BLD CALC: 37 %PCV (ref 36–54)
POC IONIZED CALCIUM: 1.22 MMOL/L (ref 1.06–1.42)
POC TCO2 (MEASURED): 24 MMOL/L (ref 23–29)
POTASSIUM BLD-SCNC: 5.5 MMOL/L (ref 3.5–5.1)
POTASSIUM SERPL-SCNC: 4.6 MMOL/L (ref 3.5–5.1)
SAMPLE: ABNORMAL
SODIUM BLD-SCNC: 138 MMOL/L (ref 136–145)
SODIUM SERPL-SCNC: 135 MMOL/L (ref 136–145)

## 2020-10-21 PROCEDURE — 99284 EMERGENCY DEPT VISIT MOD MDM: CPT | Mod: ,,, | Performed by: EMERGENCY MEDICINE

## 2020-10-21 PROCEDURE — 80048 BASIC METABOLIC PNL TOTAL CA: CPT

## 2020-10-21 PROCEDURE — 99284 PR EMERGENCY DEPT VISIT,LEVEL IV: ICD-10-PCS | Mod: ,,, | Performed by: EMERGENCY MEDICINE

## 2020-10-21 PROCEDURE — 80047 BASIC METABLC PNL IONIZED CA: CPT

## 2020-10-21 PROCEDURE — 99284 EMERGENCY DEPT VISIT MOD MDM: CPT | Mod: 25

## 2020-10-21 RX ORDER — ACETAMINOPHEN 325 MG/1
650 TABLET ORAL
Status: DISCONTINUED | OUTPATIENT
Start: 2020-10-21 | End: 2020-10-21 | Stop reason: HOSPADM

## 2020-10-21 NOTE — ED PROVIDER NOTES
Encounter Date: 10/21/2020       History     Chief Complaint   Patient presents with    Fall     Pt walks with a walker, had her left leg give out and fall. Pt c/o left leg pain. No head trauma.     Leg Pain     69 yo female with a h/o CKD, DM type II, HTN, HLD, hypothyroidism, chronic pain, breat CA s/p L mastectomy,       Context:  The patient was going to walk outside and her leg gave out.  She did not hit her head, but fell instead onto her left side. She ambulates normally with a walker.   Onset:  Acute   Location: left leg pain, left shoulder   Duration:  Since this afternoon    Modifiers:  She did not take anything prior to arrival   Associated Symptoms: no syncope, no blood thinners    The history is provided by the patient and medical records. No  was used.     Review of patient's allergies indicates:  No Known Allergies  Past Medical History:   Diagnosis Date    Cervicogenic migraine     Chronic pain     CKD (chronic kidney disease) stage 4, GFR 15-29 ml/min     [facility]    CKD (chronic kidney disease) stage 4, GFR 15-29 ml/min     Diabetes mellitus     Long term use of Insulin, Diabetic Neuropathy    Fibromyalgia     Hydronephrosis     Hyperlipidemia     Hypertension 12/12/2012    Hypothyroidism 12/12/2012    ARON (iron deficiency anemia)     Insomnia     Levoscoliosis     Malignant neoplasm of upper-outer quadrant of left breast in female, estrogen receptor positive     Metabolic acidosis     Mobility impaired     Nuclear sclerosis - Both Eyes 3/24/2014    VIJAYA (obstructive sleep apnea)     Osteopenia     Pulmonary nodule     Recurrent UTI     Renal manifestation of secondary diabetes mellitus     Secondary hyperparathyroidism, renal     Urinary retention      Past Surgical History:   Procedure Laterality Date    BREAST BIOPSY Right     benign    CHOLECYSTECTOMY      COLONOSCOPY N/A 1/13/2017    Procedure: COLONOSCOPY;  Surgeon: Morris Wiseman MD;   Location: [facility] ENDO (4TH FLR);  Service: Endoscopy;  Laterality: N/A;  Renal pt Nephrology advised to avoid phosphate preps    CYSTOSCOPY N/A 10/8/2018    Procedure: CYSTOSCOPY;  Surgeon: Ronel Whittington MD;  Location: [facility] OR 1ST FLR;  Service: Urology;  Laterality: N/A;  45 min    CYSTOSCOPY N/A 3/25/2019    Procedure: CYSTOSCOPY;  Surgeon: Ronel Whittington MD;  [facility];  Service: Urology;  Laterality: N/A;  45 min    CYSTOSCOPY N/A 8/26/2019    Procedure: CYSTOSCOPY;  Surgeon: Ronel Whittington MD;  [facility];  Service: Urology;  Laterality: N/A;  45 min    CYSTOSCOPY WITH BIOPSY OF BLADDER N/A 1/27/2020    Procedure: CYSTOSCOPY, WITH BLADDER BIOPSY, WITH FULGURATION IF INDICATED;  Surgeon: Ronel Whittington MD;  Location: [facility] OR [address] [place] [address];  Service: Urology;  Laterality: N/A;    DILATION AND CURETTAGE OF UTERUS      GALLBLADDER SURGERY  2006    HYSTERECTOMY      INJECTION FOR SENTINEL NODE IDENTIFICATION Left 8/1/2019    Procedure: INJECTION, FOR SENTINEL NODE IDENTIFICATION;  Surgeon: Samia Fulton MD;  Location: [facility] OR [address] [place] [address];  Service: General;  Laterality: Left;    INJECTION OF BOTULINUM TOXIN TYPE A N/A 10/8/2018    Procedure: INJECTION, BOTULINUM TOXIN, TYPE A 300 UNITS;  Surgeon: Ronel Whittington MD;  Location: [facility] OR [address] [place] [address];  Service: Urology;  Laterality: N/A;    INJECTION OF BOTULINUM TOXIN TYPE A N/A 3/25/2019    Procedure: INJECTION, BOTULINUM TOXIN, TYPE A 300 UNITS;  Surgeon: Ronel Whittington MD;  [facility];  Service: Urology;  Laterality: N/A;    INJECTION OF BOTULINUM TOXIN TYPE A N/A 8/26/2019    Procedure: INJECTION, BOTULINUM TOXIN, TYPE A 300 UNITS;  Surgeon: Ronel Whittington MD;  Location: [facility] OR [address] [place] [address];  Service: Urology;  Laterality: N/A;    MASTECTOMY Left 8/1/2019    Procedure: MASTECTOMY 23 hour stay;  Surgeon: Samia Fulton MD;  Location: [facility] OR [address] [place] [address];  Service: General;   Laterality: Left;    OVARIAN CYST SURGERY  1985    SENTINEL LYMPH NODE BIOPSY Left 8/1/2019    Procedure: BIOPSY, LYMPH NODE, SENTINEL;  Surgeon: Samia Fulton MD;  Location: [facility] OR [address] [place] [address];  Service: General;  Laterality: Left;    spt placement      TONSILLECTOMY, ADENOIDECTOMY      TOTAL ABDOMINAL HYSTERECTOMY W/ BILATERAL SALPINGOOPHORECTOMY  1985     Family History   Problem Relation Age of Onset    Diabetes Sister     Kidney disease Sister         CKD III    ALS Mother         d.    Cancer Maternal Grandmother         d. colon    Cancer Paternal Grandfather         d. lung    Scoliosis Brother         increased pain    Prostate cancer Brother         cured s/p surgery    Cancer Brother         rare cancer that got into the bones    Diabetes Maternal Aunt     Kidney disease Maternal Aunt     Diabetes Maternal Uncle     Amblyopia Neg Hx     Blindness Neg Hx     Cataracts Neg Hx     Glaucoma Neg Hx     Macular degeneration Neg Hx     Retinal detachment Neg Hx     Strabismus Neg Hx      Social History     Tobacco Use    Smoking status: Never Smoker    Smokeless tobacco: Never Used   Substance Use Topics    Alcohol use: No     Alcohol/week: 0.0 standard drinks    Drug use: No     Review of Systems   Constitutional: Negative for fever.   HENT: Negative for facial swelling.    Eyes: Negative for redness.   Respiratory: Negative for shortness of breath.    Cardiovascular: Negative for chest pain.   Gastrointestinal: Negative for abdominal pain.   Musculoskeletal: Positive for arthralgias and joint swelling. Negative for back pain and neck pain.   Skin: Negative for wound.   Neurological: Negative for facial asymmetry.   Hematological: Does not bruise/bleed easily.       Physical Exam     Initial Vitals [10/21/20 1447]   BP Pulse Resp Temp SpO2   (!) 149/85 69 15 98.6 °F (37 °C) 99 %      MAP       --         Physical Exam    Nursing note and vitals reviewed.  Constitutional: She is not  diaphoretic. No distress.   HENT:   Head: Normocephalic and atraumatic.   Eyes: Right eye exhibits no discharge. Left eye exhibits no discharge.   Neck: Neck supple. No tracheal deviation present.   Cardiovascular: Normal rate and regular rhythm.   Pulmonary/Chest: Breath sounds normal. No respiratory distress.   Abdominal: Soft. There is no abdominal tenderness.   Musculoskeletal:      Comments: Left shoulder:  Full ROM, diffusely tender to palpation, all compartments soft, 2+ radial    Left LE:  Diffuse TTP tib/fib, ankle, 2+ DP, no skin tenting, all extremity compartments soft   Left knee:  Nontender, can straight leg raise off bed, no ligament laxity     Hips nontender    No spinal or paraspinal TTP   Neurological: She is alert and oriented to person, place, and time.   Skin: Skin is warm. No rash noted.   Psychiatric: She has a normal mood and affect. Her behavior is normal.         ED Course   Procedures  Labs Reviewed   BASIC METABOLIC PANEL - Abnormal; Notable for the following components:       Result Value    Sodium 135 (*)     CO2 22 (*)     Glucose 172 (*)     Creatinine 1.8 (*)     eGFR if  32.9 (*)     eGFR if non  28.5 (*)     All other components within normal limits   ISTAT PROCEDURE - Abnormal; Notable for the following components:    POC Glucose 164 (*)     POC Creatinine 1.8 (*)     POC Potassium 5.5 (*)     All other components within normal limits   ISTAT CHEM8          Imaging Results          X-Ray Tibia Fibula 2 View Left (Final result)  Result time 10/21/20 17:38:50    Final result by Ravi Lua MD (10/21/20 17:38:50)                 Impression:      Suspected nonspecific soft tissue swelling about the distal left lower leg and ankle without acute displaced fracture-dislocation identified.      Electronically signed by: Ravi Lua MD  Date:    10/21/2020  Time:    17:38             Narrative:    EXAMINATION:  XR TIBIA FIBULA 2 VIEW LEFT    CLINICAL  HISTORY:  Unspecified fall, initial encounter    TECHNIQUE:  AP and lateral views of the left tibia and fibula were performed.    COMPARISON:  Left foot series 04/02/2015, bilateral knee series 02/13/2014    FINDINGS:  Generalized osteopenia.  Overall alignment is within normal limits.  No displaced fracture, dislocation or destructive osseous process.  Nonspecific soft tissue prominence about the distal left lower leg and ankle.  No subcutaneous emphysema or radiodense retained foreign body.                               X-Ray Shoulder 2 or More Views Left (Final result)  Result time 10/21/20 17:37:32    Final result by Ravi Lua MD (10/21/20 17:37:32)                 Impression:      No acute displaced fracture-dislocation identified.      Electronically signed by: Ravi Lua MD  Date:    10/21/2020  Time:    17:37             Narrative:    EXAMINATION:  XR SHOULDER COMPLETE 2 OR MORE VIEWS LEFT    CLINICAL HISTORY:  fall;    TECHNIQUE:  Three views of the left shoulder were performed.    COMPARISON:  Chest radiograph 09/21/2014    FINDINGS:  Generalized osteopenia.  Overall alignment is within normal limits.  No displaced fracture, dislocation or destructive osseous process.  Mild degenerative change noted about the shoulder.  Left lung apex is clear.  No subcutaneous emphysema or radiodense retained foreign body.                               X-Ray Foot Complete Left (Final result)  Result time 10/21/20 17:29:45    Final result by Ravi Lua MD (10/21/20 17:29:45)                 Impression:      Distal left lower leg, ankle and dorsal foot nonspecific soft tissue swelling without acute displaced fracture-dislocation identified.      Electronically signed by: Ravi Lua MD  Date:    10/21/2020  Time:    17:29             Narrative:    EXAMINATION:  XR ANKLE COMPLETE 3 VIEW LEFT; XR FOOT COMPLETE 3 VIEW LEFT    CLINICAL HISTORY:  Unspecified fall, initial encounter    TECHNIQUE:  AP, lateral and  oblique views of the left ankle and also left foot.    COMPARISON:  Left foot series 04/02/2015    FINDINGS:  Generalized osteopenia.  Prominence of soft tissues about the distal left lower leg and ankle and also dorsal midfoot and forefoot suggesting nonspecific swelling.  Ankle mortise is otherwise well aligned and intact.  No displaced fracture, dislocation or destructive osseous process.  Lisfranc articulation is congruent.  Small plantar calcaneal spur.  Mild degenerative change at the 1st MTP joint.  No subcutaneous emphysema or radiodense retained foreign body.                               X-Ray Ankle Complete Left (Final result)  Result time 10/21/20 17:29:45    Final result by Ravi Lua MD (10/21/20 17:29:45)                 Impression:      Distal left lower leg, ankle and dorsal foot nonspecific soft tissue swelling without acute displaced fracture-dislocation identified.      Electronically signed by: Ravi Lua MD  Date:    10/21/2020  Time:    17:29             Narrative:    EXAMINATION:  XR ANKLE COMPLETE 3 VIEW LEFT; XR FOOT COMPLETE 3 VIEW LEFT    CLINICAL HISTORY:  Unspecified fall, initial encounter    TECHNIQUE:  AP, lateral and oblique views of the left ankle and also left foot.    COMPARISON:  Left foot series 04/02/2015    FINDINGS:  Generalized osteopenia.  Prominence of soft tissues about the distal left lower leg and ankle and also dorsal midfoot and forefoot suggesting nonspecific swelling.  Ankle mortise is otherwise well aligned and intact.  No displaced fracture, dislocation or destructive osseous process.  Lisfranc articulation is congruent.  Small plantar calcaneal spur.  Mild degenerative change at the 1st MTP joint.  No subcutaneous emphysema or radiodense retained foreign body.                                 Medical Decision Making:   History:   Old Medical Records: I decided to obtain old medical records.  Initial Assessment:   71 yo female presenting after a fall.  No  head injury or LOC.  C/o left shoulder and left lower leg pain.  Plan for xrays.   Differential Diagnosis:   Including, but not limited to:  Fracture, contusion, sprain, strain   Clinical Tests:   Lab Tests: Ordered and Reviewed       <> Summary of Lab: Renal function at baseline    Radiological Study: Ordered and Reviewed  ED Management:  No acute injuries identified.  Patient lives with her sister, who will bring her walker and come to pick her up.   She agrees with discharge.  All questions answered prior to discharge.   Counseled on OTC medications for pain, patient has norco at home.  Return precautions given.  Ortho if needed.                              Clinical Impression:     ICD-10-CM ICD-9-CM   1. Pain of left lower extremity  M79.605 729.5   2. Fall  W19.XXXA E888.9                          ED Disposition Condition    Discharge Stable        ED Prescriptions     None        Follow-up Information     Follow up With Specialties Details Why Contact Info Additional Information    Lurdes Navarro MD Internal Medicine In 1 week  [address] [facility] [address]  [phone]       [facility]  As needed, If symptoms worsen [address] [facility] [address] [phone]     [facility]  As needed, If symptoms worsen [address] [facility], 5th [address]  [facility] [address] [phone] [facility] - Main Building, 5th Floor Please park in [facility] and take [facility] elevator                                       Kevin Del Valle MD  10/21/20 1833       Kevin Del Valle MD  10/21/20 1838

## 2020-10-21 NOTE — ED NOTES
I-STAT Chem-8+ Results:   Value Reference Range   Sodium 138 136-145 mmol/L   Potassium  5.5 3.5-5.1 mmol/L   Chloride 103  mmol/L   Ionized Calcium 1.22 1.06-1.42 mmol/L   CO2 (measured) 24 23-29 mmol/L   Glucose 164  mg/dL   BUN 24 6-30 mg/dL   Creatinine 1.8 0.5-1.4 mg/dL   Hematocrit 37 36-54%

## 2020-10-21 NOTE — ED TRIAGE NOTES
"Pt arrived via EMS from home. Pt reports "my legs gave out when I was walking with my cane in the foyer of my house. It was the weirdest sensation. It felt like my ankles were just crumbling." NO LOC or head trauma. Pt reports having bilateral leg pain, but worse on the left side. Hx of arthritis. Rates pain 9/10. No obvious deformity. No bruising or swelling noted.   "

## 2020-10-23 ENCOUNTER — PATIENT MESSAGE (OUTPATIENT)
Dept: INTERNAL MEDICINE | Facility: CLINIC | Age: 70
End: 2020-10-23

## 2020-10-24 NOTE — TELEPHONE ENCOUNTER
Would continue on present regimen  Be cautious if you add extra Tylenol to your meds as the hydrocodone from Dr. Stafford already has Tylenol in it

## 2020-10-26 ENCOUNTER — PATIENT MESSAGE (OUTPATIENT)
Dept: INTERNAL MEDICINE | Facility: CLINIC | Age: 70
End: 2020-10-26

## 2020-10-30 ENCOUNTER — TELEPHONE (OUTPATIENT)
Dept: INTERNAL MEDICINE | Facility: CLINIC | Age: 70
End: 2020-10-30

## 2020-10-30 ENCOUNTER — SPECIALTY PHARMACY (OUTPATIENT)
Dept: PHARMACY | Facility: CLINIC | Age: 70
End: 2020-10-30
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
## 2020-10-30 NOTE — TELEPHONE ENCOUNTER
Specialty Pharmacy - Refill Coordination    Specialty Medication Orders Linked to Encounter      Most Recent Value   Medication #1  erenumab-aooe (AIMOVIG) 140 mg/mL autoinjector (Order#[id number], Rx#[id number])          Refill Questions - Documented Responses      Most Recent Value   Relationship to patient of person spoken to?  Self   HIPAA/medical authority confirmed?  Yes   Any changes in contact preferences or allowed representatives?  No   Has the patient had any insurance changes?  No   Has the patient had any changes to specialty medication, dose, or instructions?  No   Has the patient started taking any new medications, herbals, or supplements?  No   Has the patient been diagnosed with any new medical conditions?  No   Does the patient have any new allergies to medications or foods?  No   Does the patient have any concerns about side effects?  No   Can the patient store medication/sharps container properly (at the correct temperature, away from children/pets, etc.)?  Yes   Can the patient call emergency services (911) in the event of an emergency?  Yes   Does the patient have any concerns or questions about taking or administering this medication as prescribed?  No   How many doses did the patient miss in the past 4 weeks or since the last fill?  1   Reported reason for missed doses:  Simply forgot   How many doses does the patient have on hand?  0   How many days does the patient report on hand quantity will last?  0   Does the number of doses/days supply remaining match pharmacy expected amounts?  Yes   How will the patient receive the medication?  Mail   When does the patient need to receive the medication?  11/03/20   Shipping Address  Home   Address in [facility] confirmed and updated if neccessary?  Yes   Expected Copay ($)  0   Is the patient able to afford the medication copay?  Yes   Payment Method  zero copay   Days supply of Refill  28   Would patient like to speak to a pharmacist?  No  "  Do you want to trigger an intervention?  No   Do you want to trigger an additional referral task?  No   Refill activity completed?  Yes   Refill activity plan  Refill scheduled   Shipment/Pickup Date:  11/02/20          Current Outpatient Medications   Medication Sig    amLODIPine (NORVASC) 10 MG tablet TAKE 1 TABLET (10 MG TOTAL) BY MOUTH ONCE DAILY. FOR HYPERTENSION    anastrozole (ARIMIDEX) 1 mg Tab TAKE 1 TABLET BY MOUTH EVERY DAY    azelastine (ASTELIN) 137 mcg (0.1 %) nasal spray INSTILL 1 SPRAY IN NASAL TWICE A DAY    BD INSULIN SYRINGE ULTRA-FINE 0.5 mL 31 gauge x 5/16" Syrg USE WITH INSULIN 4 TIMES A DAY    blood sugar diagnostic Strp ONE TOUCH ULTRA Check blood sugars 1-2 x a day    blood-glucose meter kit ONE TOUCH ULTRA    butalbital-acetaminophen-caffeine -40 mg (FIORICET, ESGIC) -40 mg per tablet TAKE 1 TABLET BY MOUTH WHEN NOT CONTROLLED BY OTHER MEDS. NO MORE THAN 10 TABS PER 30 DAYS    cloNIDine (CATAPRES) 0.1 MG tablet TAKE 1 TABLET BY MOUTH EVERY 8 HOURS AS NEEDED FOR SYSTOLIC BLOOD PRESSURE GREATER THAN 170    cyanocobalamin, vitamin B-12, (VITAMIN B-12) 5,000 mcg Subl Place 1 tablet under the tongue daily as needed.     diclofenac sodium (VOLTAREN) 1 % Gel Apply 2 g topically 3 (three) times daily.    diphenhydrAMINE (BENADRYL) 50 MG capsule Take 25 mg by mouth daily as needed for Itching or Allergies.    erenumab-aooe (AIMOVIG) 140 mg/mL autoinjector Inject  (140 mg total) into the skin every 28 days.    ergocalciferol (ERGOCALCIFEROL) 50,000 unit Cap Take 1 capsule (50,000 Units total) by mouth every 7 days. TAKE ONE CAPSULE BY MOUTH ONE TIME PER WEEK, Takes on Tuesdays    ferrous sulfate 325 (65 FE) MG EC tablet Take 325 mg by mouth nightly.     gemfibroziL (LOPID) 600 MG tablet TAKE 1 TABLET (600 MG TOTAL) BY MOUTH 3 (THREE) TIMES A WEEK.    HYDROcodone-acetaminophen (NORCO)  mg per tablet Take 1 tablet by mouth every 6 (six) hours as needed.    insulin " "(LANTUS SOLOSTAR U-100 INSULIN) glargine 100 units/mL (3mL) SubQ pen INJECT 24 UNITS SUBCUTANEOUSLY Nightly    insulin lispro (HUMALOG U-100 INSULIN) 100 unit/mL injection INJECT 12 UNITS w/ breakfast and lunch, 15 units w/ dinner, snack dose 4 units, scale 150-200+2, 201-250+4, 251-300+6, 301-350+8, >350+10. Max daily 69 units.    lancets Misc One touch ultra, checks 1-2 x a day    levocetirizine (XYZAL) 5 MG tablet TAKE 1 TABLET BY MOUTH EVERY DAY IN THE EVENING    magnesium oxide (MAG-OX) 400 mg (241.3 mg magnesium) tablet TAKE 1 TABLET (400 MG TOTAL) BY MOUTH 2 (TWO) TIMES DAILY.    methocarbamoL (ROBAXIN) 750 MG Tab Take 1-2 tablets (750-1,500 mg total) by mouth 3 (three) times daily as needed.    multivitamin with minerals tablet Take 1 tablet by mouth every morning.     oxybutynin (DITROPAN-XL) 10 MG 24 hr tablet Take 10 mg by mouth once daily.    pen needle, diabetic (BD ULTRA-FINE SHORT PEN NEEDLE) 31 gauge x 5/16" Ndle USES W/ LANTUS DAILY. 90 DAY e 11.65    propranoloL (INDERAL LA) 80 MG 24 hr capsule Take 1 capsule (80 mg total) by mouth once daily.    riboflavin, vitamin B2, 400 mg Tab Take 400 mg by mouth once daily. (Patient not taking: Reported on 4/1/2020)    rosuvastatin (CRESTOR) 20 MG tablet TAKE 1 TABLET BY MOUTH EVERY DAY    scopolamine (TRANSDERM-SCOP) 1.3-1.5 mg (1 mg over 3 days) Place 1 patch onto the skin every 72 hours. (Patient not taking: Reported on 4/1/2020)    sodium bicarbonate 650 MG tablet TAKE 1 TABLET (650 MG TOTAL) BY MOUTH 3 (THREE) TIMES DAILY.    sumatriptan (IMITREX) 100 MG tablet TAKE 1 TABLET (100 MG TOTAL) BY MOUTH 2 (TWO) TIMES DAILY AS NEEDED FOR MIGRAINE.    SYNTHROID 50 mcg tablet TAKE 1 TABLET BY MOUTH EVERY DAY    topiramate (TOPAMAX) 200 MG Tab TAKE 1 TABLET BY MOUTH TWICE A DAY    traZODone (DESYREL) 100 MG tablet TAKE 1 TABLET BY MOUTH AT BEDTIME MAY TAKE AN EXTRA HALF TABLET IF NEEDED    ubrogepant (UBRELVY)tablet 100 mg Take 1 tablet (100 mg " total) by mouth daily as needed.    venlafaxine (EFFEXOR-XR) 75 MG 24 hr capsule TAKE 2 CAPSULES (150 MG TOTAL) BY MOUTH ONCE DAILY.   Last reviewed on 10/21/2020  3:39 PM by Kevin Del Valle MD    Review of patient's allergies indicates:  No Known Allergies Last reviewed on  10/21/2020 3:19 PM by Jaqueline Flores      Tasks added this encounter   No tasks added.   Tasks due within next 3 months   10/20/2020 - Refill Call     [facility]  [address] [place] [id number]  Phone: [phone]  Fax: [phone]

## 2020-10-30 NOTE — TELEPHONE ENCOUNTER
----- Message from Lana Fierro sent at 10/30/2020  4:44 PM CDT -----  Contact: [facility] [phone]  Last Thursday pt fell and had Xrays done she does have bruising on lateral part of left foot and ankle its swollen and has pain

## 2020-11-04 ENCOUNTER — PATIENT OUTREACH (OUTPATIENT)
Dept: ADMINISTRATIVE | Facility: HOSPITAL | Age: 70
End: 2020-11-04

## 2020-11-04 NOTE — PROGRESS NOTES
Spoke with pt to schedule annual and labs. Pt states that she would like to call back when he recovers from an ankle sprain.

## 2020-11-13 ENCOUNTER — PATIENT MESSAGE (OUTPATIENT)
Dept: PHYSICAL MEDICINE AND REHAB | Facility: CLINIC | Age: 70
End: 2020-11-13

## 2020-11-13 DIAGNOSIS — M79.7 FIBROMYALGIA: ICD-10-CM

## 2020-11-13 DIAGNOSIS — M54.50 CHRONIC MIDLINE LOW BACK PAIN WITHOUT SCIATICA: ICD-10-CM

## 2020-11-13 DIAGNOSIS — G89.29 CHRONIC MIDLINE LOW BACK PAIN WITHOUT SCIATICA: ICD-10-CM

## 2020-11-13 DIAGNOSIS — G89.29 CHRONIC NECK PAIN: ICD-10-CM

## 2020-11-13 DIAGNOSIS — M54.2 CHRONIC NECK PAIN: ICD-10-CM

## 2020-11-13 RX ORDER — HYDROCODONE BITARTRATE AND ACETAMINOPHEN 10; 325 MG/1; MG/1
1 TABLET ORAL EVERY 6 HOURS PRN
Qty: 120 TABLET | Refills: 0 | Status: SHIPPED | OUTPATIENT
Start: 2020-11-14 | End: 2020-12-14 | Stop reason: SDUPTHER

## 2020-11-24 ENCOUNTER — SPECIALTY PHARMACY (OUTPATIENT)
Dept: PHARMACY | Facility: CLINIC | Age: 70
End: 2020-11-24

## 2020-11-24 NOTE — TELEPHONE ENCOUNTER
Specialty Pharmacy - Refill Coordination    Specialty Medication Orders Linked to Encounter      Most Recent Value   Medication #1  erenumab-aooe (AIMOVIG) 140 mg/mL autoinjector (Order#[id number], Rx#[id number])          Refill Questions - Documented Responses      Most Recent Value   Relationship to patient of person spoken to?  Self   HIPAA/medical authority confirmed?  Yes   Any changes in contact preferences or allowed representatives?  No   Has the patient had any insurance changes?  No   Has the patient had any changes to specialty medication, dose, or instructions?  No   Has the patient started taking any new medications, herbals, or supplements?  No   Has the patient been diagnosed with any new medical conditions?  No   Does the patient have any new allergies to medications or foods?  No   Does the patient have any concerns about side effects?  No   Can the patient store medication/sharps container properly (at the correct temperature, away from children/pets, etc.)?  Yes   Can the patient call emergency services (911) in the event of an emergency?  Yes   Does the patient have any concerns or questions about taking or administering this medication as prescribed?  No   How many doses did the patient miss in the past 4 weeks or since the last fill?  0   How many doses does the patient have on hand?  0   How many days does the patient report on hand quantity will last?  0   Does the number of doses/days supply remaining match pharmacy expected amounts?  Yes   How will the patient receive the medication?  Mail   When does the patient need to receive the medication?  12/02/20   Shipping Address  Home   Address in [facility] confirmed and updated if neccessary?  Yes   Expected Copay ($)  0   Is the patient able to afford the medication copay?  Yes   Payment Method  zero copay   Days supply of Refill  28   Would patient like to speak to a pharmacist?  No   Do you want to trigger an intervention?  No   Do  "you want to trigger an additional referral task?  No   Refill activity completed?  Yes   Refill activity plan  Refill scheduled   Shipment/Pickup Date:  11/30/20          Current Outpatient Medications   Medication Sig    amLODIPine (NORVASC) 10 MG tablet TAKE 1 TABLET (10 MG TOTAL) BY MOUTH ONCE DAILY. FOR HYPERTENSION    anastrozole (ARIMIDEX) 1 mg Tab TAKE 1 TABLET BY MOUTH EVERY DAY    azelastine (ASTELIN) 137 mcg (0.1 %) nasal spray INSTILL 1 SPRAY IN NASAL TWICE A DAY    BD INSULIN SYRINGE ULTRA-FINE 0.5 mL 31 gauge x 5/16" Syrg USE WITH INSULIN 4 TIMES A DAY    blood sugar diagnostic Strp ONE TOUCH ULTRA Check blood sugars 1-2 x a day    blood-glucose meter kit ONE TOUCH ULTRA    butalbital-acetaminophen-caffeine -40 mg (FIORICET, ESGIC) -40 mg per tablet TAKE 1 TABLET BY MOUTH WHEN NOT CONTROLLED BY OTHER MEDS. NO MORE THAN 10 TABS PER 30 DAYS    cloNIDine (CATAPRES) 0.1 MG tablet TAKE 1 TABLET BY MOUTH EVERY 8 HOURS AS NEEDED FOR SYSTOLIC BLOOD PRESSURE GREATER THAN 170    cyanocobalamin, vitamin B-12, (VITAMIN B-12) 5,000 mcg Subl Place 1 tablet under the tongue daily as needed.     diclofenac sodium (VOLTAREN) 1 % Gel Apply 2 g topically 3 (three) times daily.    diphenhydrAMINE (BENADRYL) 50 MG capsule Take 25 mg by mouth daily as needed for Itching or Allergies.    erenumab-aooe (AIMOVIG) 140 mg/mL autoinjector Inject  (140 mg total) into the skin every 28 days.    ergocalciferol (ERGOCALCIFEROL) 50,000 unit Cap Take 1 capsule (50,000 Units total) by mouth every 7 days. TAKE ONE CAPSULE BY MOUTH ONE TIME PER WEEK, Takes on Tuesdays    ferrous sulfate 325 (65 FE) MG EC tablet Take 325 mg by mouth nightly.     gemfibroziL (LOPID) 600 MG tablet TAKE 1 TABLET (600 MG TOTAL) BY MOUTH 3 (THREE) TIMES A WEEK.    HYDROcodone-acetaminophen (NORCO)  mg per tablet Take 1 tablet by mouth every 6 (six) hours as needed.    insulin (LANTUS SOLOSTAR U-100 INSULIN) glargine 100 " "units/mL (3mL) SubQ pen INJECT 24 UNITS SUBCUTANEOUSLY Nightly    insulin lispro (HUMALOG U-100 INSULIN) 100 unit/mL injection INJECT 12 UNITS w/ breakfast and lunch, 15 units w/ dinner, snack dose 4 units, scale 150-200+2, 201-250+4, 251-300+6, 301-350+8, >350+10. Max daily 69 units.    lancets Misc One touch ultra, checks 1-2 x a day    levocetirizine (XYZAL) 5 MG tablet TAKE 1 TABLET BY MOUTH EVERY DAY IN THE EVENING    magnesium oxide (MAG-OX) 400 mg (241.3 mg magnesium) tablet TAKE 1 TABLET (400 MG TOTAL) BY MOUTH 2 (TWO) TIMES DAILY.    methocarbamoL (ROBAXIN) 750 MG Tab Take 1-2 tablets (750-1,500 mg total) by mouth 3 (three) times daily as needed.    multivitamin with minerals tablet Take 1 tablet by mouth every morning.     oxybutynin (DITROPAN-XL) 10 MG 24 hr tablet Take 10 mg by mouth once daily.    pen needle, diabetic (BD ULTRA-FINE SHORT PEN NEEDLE) 31 gauge x 5/16" Ndle USES W/ LANTUS DAILY. 90 DAY e 11.65    propranoloL (INDERAL LA) 80 MG 24 hr capsule Take 1 capsule (80 mg total) by mouth once daily.    riboflavin, vitamin B2, 400 mg Tab Take 400 mg by mouth once daily. (Patient not taking: Reported on 4/1/2020)    rosuvastatin (CRESTOR) 20 MG tablet TAKE 1 TABLET BY MOUTH EVERY DAY    scopolamine (TRANSDERM-SCOP) 1.3-1.5 mg (1 mg over 3 days) Place 1 patch onto the skin every 72 hours. (Patient not taking: Reported on 4/1/2020)    sodium bicarbonate 650 MG tablet TAKE 1 TABLET (650 MG TOTAL) BY MOUTH 3 (THREE) TIMES DAILY.    sumatriptan (IMITREX) 100 MG tablet TAKE 1 TABLET (100 MG TOTAL) BY MOUTH 2 (TWO) TIMES DAILY AS NEEDED FOR MIGRAINE.    SYNTHROID 50 mcg tablet TAKE 1 TABLET BY MOUTH EVERY DAY    topiramate (TOPAMAX) 200 MG Tab TAKE 1 TABLET BY MOUTH TWICE A DAY    traZODone (DESYREL) 100 MG tablet TAKE 1 TABLET BY MOUTH AT BEDTIME MAY TAKE AN EXTRA HALF TABLET IF NEEDED    ubrogepant (UBRELVY)tablet 100 mg Take 1 tablet (100 mg total) by mouth daily as needed.    " venlafaxine (EFFEXOR-XR) 75 MG 24 hr capsule TAKE 2 CAPSULES (150 MG TOTAL) BY MOUTH ONCE DAILY.   Last reviewed on 10/21/2020  3:39 PM by Kevin Del Valle MD    Review of patient's allergies indicates:  No Known Allergies Last reviewed on  11/13/2020 9:07 PM by Tesha Stafford      Tasks added this encounter   12/22/2020 - Refill Call (Auto Added)   Tasks due within next 3 months   No tasks due.     [facility]  [address] [place] [id number]  Phone: [phone]  Fax: [phone]

## 2020-11-30 ENCOUNTER — TELEPHONE (OUTPATIENT)
Dept: INTERNAL MEDICINE | Facility: CLINIC | Age: 70
End: 2020-11-30

## 2020-11-30 NOTE — TELEPHONE ENCOUNTER
----- Message from Ariane Joyner LPN sent at 11/27/2020  4:04 PM CST -----  Contact: [phone] or [phone]    ----- Message -----  From: Sara Ramirez  Sent: 11/27/2020   2:57 PM CST  To: Ramon Lino Staff    Patient is requesting a call back from the nurse to please let her know when the Covid vaccines comes in. Patient states she needs to be at the top of the list due to being a high risk patient. Please call and advise

## 2020-12-04 ENCOUNTER — PATIENT MESSAGE (OUTPATIENT)
Dept: NEPHROLOGY | Facility: CLINIC | Age: 70
End: 2020-12-04

## 2020-12-04 ENCOUNTER — PATIENT MESSAGE (OUTPATIENT)
Dept: SURGERY | Facility: CLINIC | Age: 70
End: 2020-12-04

## 2020-12-11 ENCOUNTER — PATIENT OUTREACH (OUTPATIENT)
Dept: HOME HEALTH SERVICES | Facility: HOSPITAL | Age: 70
End: 2020-12-11

## 2020-12-13 ENCOUNTER — PATIENT MESSAGE (OUTPATIENT)
Dept: NEPHROLOGY | Facility: CLINIC | Age: 70
End: 2020-12-13

## 2020-12-13 ENCOUNTER — PATIENT MESSAGE (OUTPATIENT)
Dept: PHYSICAL MEDICINE AND REHAB | Facility: CLINIC | Age: 70
End: 2020-12-13

## 2020-12-13 DIAGNOSIS — G89.29 CHRONIC NECK PAIN: ICD-10-CM

## 2020-12-13 DIAGNOSIS — G89.29 CHRONIC MIDLINE LOW BACK PAIN WITHOUT SCIATICA: ICD-10-CM

## 2020-12-13 DIAGNOSIS — M54.50 CHRONIC MIDLINE LOW BACK PAIN WITHOUT SCIATICA: ICD-10-CM

## 2020-12-13 DIAGNOSIS — M54.2 CHRONIC NECK PAIN: ICD-10-CM

## 2020-12-13 DIAGNOSIS — M79.7 FIBROMYALGIA: ICD-10-CM

## 2020-12-14 ENCOUNTER — PES CALL (OUTPATIENT)
Dept: ADMINISTRATIVE | Facility: CLINIC | Age: 70
End: 2020-12-14

## 2020-12-14 PROCEDURE — G0179 MD RECERTIFICATION HHA PT: HCPCS | Mod: ,,, | Performed by: INTERNAL MEDICINE

## 2020-12-14 PROCEDURE — G0179 PR HOME HEALTH MD RECERTIFICATION: ICD-10-PCS | Mod: ,,, | Performed by: INTERNAL MEDICINE

## 2020-12-14 RX ORDER — HYDROCODONE BITARTRATE AND ACETAMINOPHEN 10; 325 MG/1; MG/1
1 TABLET ORAL EVERY 6 HOURS PRN
Qty: 120 TABLET | Refills: 0 | Status: SHIPPED | OUTPATIENT
Start: 2020-12-14 | End: 2021-01-12 | Stop reason: SDUPTHER

## 2020-12-17 ENCOUNTER — PATIENT MESSAGE (OUTPATIENT)
Dept: PHYSICAL MEDICINE AND REHAB | Facility: CLINIC | Age: 70
End: 2020-12-17

## 2020-12-17 DIAGNOSIS — G89.29 CHRONIC NECK PAIN: ICD-10-CM

## 2020-12-17 DIAGNOSIS — M54.50 CHRONIC MIDLINE LOW BACK PAIN WITHOUT SCIATICA: ICD-10-CM

## 2020-12-17 DIAGNOSIS — G89.29 CHRONIC MIDLINE LOW BACK PAIN WITHOUT SCIATICA: ICD-10-CM

## 2020-12-17 DIAGNOSIS — M54.2 CHRONIC NECK PAIN: ICD-10-CM

## 2020-12-17 DIAGNOSIS — M79.7 FIBROMYALGIA: ICD-10-CM

## 2020-12-17 RX ORDER — METHOCARBAMOL 750 MG/1
750-1500 TABLET, FILM COATED ORAL 3 TIMES DAILY PRN
Qty: 180 TABLET | Refills: 1 | Status: SHIPPED | OUTPATIENT
Start: 2020-12-17 | End: 2021-02-19 | Stop reason: SDUPTHER

## 2020-12-17 NOTE — TELEPHONE ENCOUNTER
Last Rx refill-----10/01/20  Last office visit--05/28/20  Next office visit--10/01/20     Patient underwent tunneled pleural catheter placement (L chest) utilizing a 15.5 Fr. pleurX catheter.  LOT #[id number].  900 ml drained and site was dressed with sterile dressing.    Detailed written orders and patient insurance information for drainage kits have been completed and faxed to [employer] at [phone], ATTN: [facility] [facility].  [email or web address] customer service can be reached at [phone].    Patient was discharged with pleurX starter kit containing 4 bottles with dressing along with a folder containing a DVD for review.       Jaida Richter, RRT, RCP  Interventional Respiratory Care Specialist  Endoscopy Department  Interventional Pulmonology  Respiratory Care Services  [facility]  [phone] office  [phone] pager

## 2020-12-20 ENCOUNTER — PATIENT MESSAGE (OUTPATIENT)
Dept: INTERNAL MEDICINE | Facility: CLINIC | Age: 70
End: 2020-12-20

## 2020-12-21 ENCOUNTER — PATIENT MESSAGE (OUTPATIENT)
Dept: INTERNAL MEDICINE | Facility: CLINIC | Age: 70
End: 2020-12-21

## 2020-12-21 DIAGNOSIS — Z23 HIGH PRIORITY FOR 2019 NOVEL CORONAVIRUS VACCINATION: ICD-10-CM

## 2020-12-21 DIAGNOSIS — R53.81 PHYSICAL DEBILITY: Primary | ICD-10-CM

## 2020-12-29 RX ORDER — INSULIN LISPRO 100 [IU]/ML
INJECTION, SOLUTION INTRAVENOUS; SUBCUTANEOUS
Qty: 30 ML | Refills: 6 | Status: SHIPPED | OUTPATIENT
Start: 2020-12-29 | End: 2021-01-08

## 2020-12-31 ENCOUNTER — PATIENT MESSAGE (OUTPATIENT)
Dept: UROLOGY | Facility: CLINIC | Age: 70
End: 2020-12-31

## 2020-12-31 ENCOUNTER — PATIENT MESSAGE (OUTPATIENT)
Dept: INTERNAL MEDICINE | Facility: CLINIC | Age: 70
End: 2020-12-31

## 2021-01-04 ENCOUNTER — PATIENT MESSAGE (OUTPATIENT)
Dept: ADMINISTRATIVE | Facility: HOSPITAL | Age: 71
End: 2021-01-04

## 2021-01-06 ENCOUNTER — TELEPHONE (OUTPATIENT)
Dept: INTERNAL MEDICINE | Facility: CLINIC | Age: 71
End: 2021-01-06

## 2021-01-07 ENCOUNTER — TELEPHONE (OUTPATIENT)
Dept: INTERNAL MEDICINE | Facility: CLINIC | Age: 71
End: 2021-01-07

## 2021-01-07 ENCOUNTER — NURSE TRIAGE (OUTPATIENT)
Dept: ADMINISTRATIVE | Facility: CLINIC | Age: 71
End: 2021-01-07

## 2021-01-08 ENCOUNTER — PATIENT MESSAGE (OUTPATIENT)
Dept: INTERNAL MEDICINE | Facility: CLINIC | Age: 71
End: 2021-01-08

## 2021-01-08 ENCOUNTER — TELEPHONE (OUTPATIENT)
Dept: INTERNAL MEDICINE | Facility: CLINIC | Age: 71
End: 2021-01-08

## 2021-01-08 DIAGNOSIS — E16.2 HYPOGLYCEMIA: ICD-10-CM

## 2021-01-08 RX ORDER — INSULIN PUMP SYRINGE, 3 ML
EACH MISCELLANEOUS
Qty: 1 EACH | Refills: 0 | Status: SHIPPED | OUTPATIENT
Start: 2021-01-08 | End: 2022-07-21

## 2021-01-08 RX ORDER — LANCETS
EACH MISCELLANEOUS
Qty: 400 EACH | Refills: 3 | Status: SHIPPED | OUTPATIENT
Start: 2021-01-08 | End: 2022-07-21

## 2021-01-19 ENCOUNTER — PATIENT MESSAGE (OUTPATIENT)
Dept: OPTOMETRY | Facility: CLINIC | Age: 71
End: 2021-01-19

## 2021-01-19 ENCOUNTER — PATIENT OUTREACH (OUTPATIENT)
Dept: ADMINISTRATIVE | Facility: OTHER | Age: 71
End: 2021-01-19

## 2021-01-28 ENCOUNTER — TELEPHONE (OUTPATIENT)
Dept: NEPHROLOGY | Facility: CLINIC | Age: 71
End: 2021-01-28

## 2021-02-02 ENCOUNTER — EXTERNAL HOME HEALTH (OUTPATIENT)
Dept: HOME HEALTH SERVICES | Facility: HOSPITAL | Age: 71
End: 2021-02-02
Payer: MEDICARE

## 2021-02-03 ENCOUNTER — LAB VISIT (OUTPATIENT)
Dept: LAB | Facility: HOSPITAL | Age: 71
End: 2021-02-03
Attending: INTERNAL MEDICINE
Payer: MEDICARE

## 2021-02-03 DIAGNOSIS — N31.9 NEUROGENIC DYSFUNCTION OF THE URINARY BLADDER: Primary | ICD-10-CM

## 2021-02-03 DIAGNOSIS — N39.0 URINARY TRACT INFECTION, SITE NOT SPECIFIED: ICD-10-CM

## 2021-02-03 DIAGNOSIS — E78.5 HYPERLIPEMIA: ICD-10-CM

## 2021-02-03 LAB
ALBUMIN SERPL BCP-MCNC: 2.6 G/DL (ref 3.5–5.2)
AMORPH CRY UR QL COMP ASSIST: ABNORMAL
ANION GAP SERPL CALC-SCNC: 10 MMOL/L (ref 8–16)
BACTERIA #/AREA URNS AUTO: ABNORMAL /HPF
BASOPHILS # BLD AUTO: 0.04 K/UL (ref 0–0.2)
BASOPHILS NFR BLD: 0.5 % (ref 0–1.9)
BILIRUB UR QL STRIP: NEGATIVE
BUN SERPL-MCNC: 21 MG/DL (ref 8–23)
CALCIUM SERPL-MCNC: 8.5 MG/DL (ref 8.7–10.5)
CHLORIDE SERPL-SCNC: 104 MMOL/L (ref 95–110)
CLARITY UR REFRACT.AUTO: ABNORMAL
CO2 SERPL-SCNC: 25 MMOL/L (ref 23–29)
COLOR UR AUTO: YELLOW
CREAT SERPL-MCNC: 1.7 MG/DL (ref 0.5–1.4)
CREAT UR-MCNC: 29 MG/DL (ref 15–325)
DIFFERENTIAL METHOD: ABNORMAL
EOSINOPHIL # BLD AUTO: 0.3 K/UL (ref 0–0.5)
EOSINOPHIL NFR BLD: 3.3 % (ref 0–8)
ERYTHROCYTE [DISTWIDTH] IN BLOOD BY AUTOMATED COUNT: 13.6 % (ref 11.5–14.5)
EST. GFR  (AFRICAN AMERICAN): 35.2 ML/MIN/1.73 M^2
EST. GFR  (NON AFRICAN AMERICAN): 30.6 ML/MIN/1.73 M^2
GLUCOSE SERPL-MCNC: 198 MG/DL (ref 70–110)
GLUCOSE UR QL STRIP: NEGATIVE
HCT VFR BLD AUTO: 39.3 % (ref 37–48.5)
HGB BLD-MCNC: 12.2 G/DL (ref 12–16)
HGB UR QL STRIP: ABNORMAL
HYALINE CASTS UR QL AUTO: 0 /LPF
IMM GRANULOCYTES # BLD AUTO: 0.03 K/UL (ref 0–0.04)
IMM GRANULOCYTES NFR BLD AUTO: 0.3 % (ref 0–0.5)
KETONES UR QL STRIP: NEGATIVE
LEUKOCYTE ESTERASE UR QL STRIP: ABNORMAL
LYMPHOCYTES # BLD AUTO: 2 K/UL (ref 1–4.8)
LYMPHOCYTES NFR BLD: 23.2 % (ref 18–48)
MCH RBC QN AUTO: 27.5 PG (ref 27–31)
MCHC RBC AUTO-ENTMCNC: 31 G/DL (ref 32–36)
MCV RBC AUTO: 89 FL (ref 82–98)
MICROSCOPIC COMMENT: ABNORMAL
MONOCYTES # BLD AUTO: 0.8 K/UL (ref 0.3–1)
MONOCYTES NFR BLD: 8.6 % (ref 4–15)
NEUTROPHILS # BLD AUTO: 5.6 K/UL (ref 1.8–7.7)
NEUTROPHILS NFR BLD: 64.1 % (ref 38–73)
NITRITE UR QL STRIP: POSITIVE
NRBC BLD-RTO: 0 /100 WBC
PH UR STRIP: >8 [PH] (ref 5–8)
PHOSPHATE SERPL-MCNC: 3.7 MG/DL (ref 2.7–4.5)
PLATELET # BLD AUTO: 327 K/UL (ref 150–350)
PMV BLD AUTO: 10.1 FL (ref 9.2–12.9)
POTASSIUM SERPL-SCNC: 3.9 MMOL/L (ref 3.5–5.1)
PROT UR QL STRIP: ABNORMAL
PROT UR-MCNC: 161 MG/DL (ref 0–15)
PROT/CREAT UR: 5.55 MG/G{CREAT} (ref 0–0.2)
PTH-INTACT SERPL-MCNC: 85 PG/ML (ref 9–77)
RBC # BLD AUTO: 4.43 M/UL (ref 4–5.4)
RBC #/AREA URNS AUTO: 5 /HPF (ref 0–4)
SODIUM SERPL-SCNC: 139 MMOL/L (ref 136–145)
SP GR UR STRIP: 1.01 (ref 1–1.03)
URN SPEC COLLECT METH UR: ABNORMAL
WBC # BLD AUTO: 8.76 K/UL (ref 3.9–12.7)
WBC #/AREA URNS AUTO: 27 /HPF (ref 0–5)
YEAST UR QL AUTO: ABNORMAL

## 2021-02-03 PROCEDURE — 36415 COLL VENOUS BLD VENIPUNCTURE: CPT

## 2021-02-03 PROCEDURE — 87088 URINE BACTERIA CULTURE: CPT

## 2021-02-03 PROCEDURE — 87086 URINE CULTURE/COLONY COUNT: CPT

## 2021-02-03 PROCEDURE — 82570 ASSAY OF URINE CREATININE: CPT

## 2021-02-03 PROCEDURE — 80069 RENAL FUNCTION PANEL: CPT

## 2021-02-03 PROCEDURE — 87077 CULTURE AEROBIC IDENTIFY: CPT

## 2021-02-03 PROCEDURE — 81001 URINALYSIS AUTO W/SCOPE: CPT

## 2021-02-03 PROCEDURE — 85025 COMPLETE CBC W/AUTO DIFF WBC: CPT

## 2021-02-03 PROCEDURE — 83970 ASSAY OF PARATHORMONE: CPT

## 2021-02-03 PROCEDURE — 87186 SC STD MICRODIL/AGAR DIL: CPT

## 2021-02-06 LAB — BACTERIA UR CULT: ABNORMAL

## 2021-02-08 ENCOUNTER — OFFICE VISIT (OUTPATIENT)
Dept: NEPHROLOGY | Facility: CLINIC | Age: 71
End: 2021-02-08
Payer: MEDICARE

## 2021-02-08 VITALS
WEIGHT: 270.31 LBS | BODY MASS INDEX: 40.97 KG/M2 | HEART RATE: 68 BPM | TEMPERATURE: 98 F | SYSTOLIC BLOOD PRESSURE: 142 MMHG | OXYGEN SATURATION: 98 % | HEIGHT: 68 IN | DIASTOLIC BLOOD PRESSURE: 80 MMHG

## 2021-02-08 DIAGNOSIS — E11.69 DIABETES MELLITUS TYPE 2 IN OBESE: ICD-10-CM

## 2021-02-08 DIAGNOSIS — E55.9 VITAMIN D DEFICIENCY DISEASE: ICD-10-CM

## 2021-02-08 DIAGNOSIS — E03.9 HYPOTHYROIDISM, UNSPECIFIED TYPE: ICD-10-CM

## 2021-02-08 DIAGNOSIS — E11.69 HYPERLIPIDEMIA ASSOCIATED WITH TYPE 2 DIABETES MELLITUS: ICD-10-CM

## 2021-02-08 DIAGNOSIS — E66.9 DIABETES MELLITUS TYPE 2 IN OBESE: ICD-10-CM

## 2021-02-08 DIAGNOSIS — E78.5 HYPERLIPIDEMIA ASSOCIATED WITH TYPE 2 DIABETES MELLITUS: ICD-10-CM

## 2021-02-08 DIAGNOSIS — N31.9 NEUROGENIC BLADDER: ICD-10-CM

## 2021-02-08 DIAGNOSIS — N18.4 CKD (CHRONIC KIDNEY DISEASE) STAGE 4, GFR 15-29 ML/MIN: Primary | ICD-10-CM

## 2021-02-08 DIAGNOSIS — Z79.811 PROPHYLACTIC USE OF ANASTROZOLE (ARIMIDEX): ICD-10-CM

## 2021-02-08 DIAGNOSIS — E66.01 MORBID OBESITY DUE TO EXCESS CALORIES: ICD-10-CM

## 2021-02-08 DIAGNOSIS — D50.8 OTHER IRON DEFICIENCY ANEMIA: ICD-10-CM

## 2021-02-08 DIAGNOSIS — E87.20 METABOLIC ACIDOSIS: ICD-10-CM

## 2021-02-08 DIAGNOSIS — E11.59 HYPERTENSION ASSOCIATED WITH DIABETES: ICD-10-CM

## 2021-02-08 DIAGNOSIS — M85.80 OSTEOPENIA, UNSPECIFIED LOCATION: ICD-10-CM

## 2021-02-08 DIAGNOSIS — E11.22 CKD STAGE 4 DUE TO TYPE 2 DIABETES MELLITUS: ICD-10-CM

## 2021-02-08 DIAGNOSIS — G47.33 OSA (OBSTRUCTIVE SLEEP APNEA): ICD-10-CM

## 2021-02-08 DIAGNOSIS — Z90.12 S/P LEFT MASTECTOMY: ICD-10-CM

## 2021-02-08 DIAGNOSIS — I10 ESSENTIAL HYPERTENSION: ICD-10-CM

## 2021-02-08 DIAGNOSIS — N25.81 SECONDARY HYPERPARATHYROIDISM, RENAL: ICD-10-CM

## 2021-02-08 DIAGNOSIS — N18.4 CKD STAGE 4 DUE TO TYPE 2 DIABETES MELLITUS: ICD-10-CM

## 2021-02-08 DIAGNOSIS — C50.412 MALIGNANT NEOPLASM OF UPPER-OUTER QUADRANT OF LEFT BREAST IN FEMALE, ESTROGEN RECEPTOR POSITIVE: ICD-10-CM

## 2021-02-08 DIAGNOSIS — E11.40 TYPE 2 DIABETES MELLITUS WITH DIABETIC NEUROPATHY, WITH LONG-TERM CURRENT USE OF INSULIN: ICD-10-CM

## 2021-02-08 DIAGNOSIS — G43.909 MIXED MIGRAINE AND MUSCLE CONTRACTION HEADACHE: ICD-10-CM

## 2021-02-08 DIAGNOSIS — M79.7 FIBROMYALGIA: ICD-10-CM

## 2021-02-08 DIAGNOSIS — Z93.6 PRESENCE OF UROSTOMY: ICD-10-CM

## 2021-02-08 DIAGNOSIS — Z79.4 TYPE 2 DIABETES MELLITUS WITH DIABETIC NEUROPATHY, WITH LONG-TERM CURRENT USE OF INSULIN: ICD-10-CM

## 2021-02-08 DIAGNOSIS — Z17.0 MALIGNANT NEOPLASM OF UPPER-OUTER QUADRANT OF LEFT BREAST IN FEMALE, ESTROGEN RECEPTOR POSITIVE: ICD-10-CM

## 2021-02-08 DIAGNOSIS — Z93.59 CHRONIC SUPRAPUBIC CATHETER: ICD-10-CM

## 2021-02-08 DIAGNOSIS — R06.02 SOB (SHORTNESS OF BREATH): ICD-10-CM

## 2021-02-08 DIAGNOSIS — G44.209 MIXED MIGRAINE AND MUSCLE CONTRACTION HEADACHE: ICD-10-CM

## 2021-02-08 DIAGNOSIS — E11.29 TYPE 2 DIABETES MELLITUS WITH PROTEINURIA: ICD-10-CM

## 2021-02-08 DIAGNOSIS — I15.2 HYPERTENSION ASSOCIATED WITH DIABETES: ICD-10-CM

## 2021-02-08 DIAGNOSIS — R80.9 TYPE 2 DIABETES MELLITUS WITH PROTEINURIA: ICD-10-CM

## 2021-02-08 PROCEDURE — 99215 PR OFFICE/OUTPT VISIT, EST, LEVL V, 40-54 MIN: ICD-10-PCS | Mod: S$PBB,,, | Performed by: INTERNAL MEDICINE

## 2021-02-08 PROCEDURE — 99215 OFFICE O/P EST HI 40 MIN: CPT | Mod: PBBFAC,PO | Performed by: INTERNAL MEDICINE

## 2021-02-08 PROCEDURE — 99215 OFFICE O/P EST HI 40 MIN: CPT | Mod: S$PBB,,, | Performed by: INTERNAL MEDICINE

## 2021-02-08 PROCEDURE — 99999 PR PBB SHADOW E&M-EST. PATIENT-LVL V: ICD-10-PCS | Mod: PBBFAC,,, | Performed by: INTERNAL MEDICINE

## 2021-02-08 PROCEDURE — 99999 PR PBB SHADOW E&M-EST. PATIENT-LVL V: CPT | Mod: PBBFAC,,, | Performed by: INTERNAL MEDICINE

## 2021-02-09 ENCOUNTER — PATIENT MESSAGE (OUTPATIENT)
Dept: NEPHROLOGY | Facility: CLINIC | Age: 71
End: 2021-02-09

## 2021-02-10 ENCOUNTER — PATIENT MESSAGE (OUTPATIENT)
Dept: INTERNAL MEDICINE | Facility: CLINIC | Age: 71
End: 2021-02-10

## 2021-02-14 PROBLEM — N18.4 CKD (CHRONIC KIDNEY DISEASE) STAGE 4, GFR 15-29 ML/MIN: Status: ACTIVE | Noted: 2021-02-14

## 2021-02-15 ENCOUNTER — PATIENT OUTREACH (OUTPATIENT)
Dept: HOME HEALTH SERVICES | Facility: HOSPITAL | Age: 71
End: 2021-02-15

## 2021-02-15 ENCOUNTER — PATIENT MESSAGE (OUTPATIENT)
Dept: INTERNAL MEDICINE | Facility: CLINIC | Age: 71
End: 2021-02-15

## 2021-02-17 ENCOUNTER — PATIENT MESSAGE (OUTPATIENT)
Dept: NEPHROLOGY | Facility: CLINIC | Age: 71
End: 2021-02-17

## 2021-02-17 ENCOUNTER — TELEPHONE (OUTPATIENT)
Dept: INTERNAL MEDICINE | Facility: CLINIC | Age: 71
End: 2021-02-17

## 2021-02-17 ENCOUNTER — EXTERNAL HOME HEALTH (OUTPATIENT)
Dept: HOME HEALTH SERVICES | Facility: HOSPITAL | Age: 71
End: 2021-02-17
Payer: COMMERCIAL

## 2021-02-18 ENCOUNTER — PES CALL (OUTPATIENT)
Dept: ADMINISTRATIVE | Facility: CLINIC | Age: 71
End: 2021-02-18

## 2021-02-18 ENCOUNTER — PATIENT MESSAGE (OUTPATIENT)
Dept: PHYSICAL MEDICINE AND REHAB | Facility: CLINIC | Age: 71
End: 2021-02-18

## 2021-02-18 DIAGNOSIS — G89.29 CHRONIC MIDLINE LOW BACK PAIN WITHOUT SCIATICA: ICD-10-CM

## 2021-02-18 DIAGNOSIS — G89.29 CHRONIC NECK PAIN: ICD-10-CM

## 2021-02-18 DIAGNOSIS — M79.7 FIBROMYALGIA: ICD-10-CM

## 2021-02-18 DIAGNOSIS — M54.50 CHRONIC MIDLINE LOW BACK PAIN WITHOUT SCIATICA: ICD-10-CM

## 2021-02-18 DIAGNOSIS — M54.2 CHRONIC NECK PAIN: ICD-10-CM

## 2021-02-19 RX ORDER — METHOCARBAMOL 750 MG/1
750-1500 TABLET, FILM COATED ORAL 3 TIMES DAILY PRN
Qty: 180 TABLET | Refills: 1 | Status: SHIPPED | OUTPATIENT
Start: 2021-02-19 | End: 2021-05-05 | Stop reason: SDUPTHER

## 2021-02-19 RX ORDER — HYDROCODONE BITARTRATE AND ACETAMINOPHEN 10; 325 MG/1; MG/1
1 TABLET ORAL EVERY 6 HOURS PRN
Qty: 120 TABLET | Refills: 0 | Status: SHIPPED | OUTPATIENT
Start: 2021-02-19 | End: 2021-03-19 | Stop reason: SDUPTHER

## 2021-02-23 ENCOUNTER — DOCUMENT SCAN (OUTPATIENT)
Dept: HOME HEALTH SERVICES | Facility: HOSPITAL | Age: 71
End: 2021-02-23
Payer: COMMERCIAL

## 2021-02-25 ENCOUNTER — PATIENT OUTREACH (OUTPATIENT)
Dept: ADMINISTRATIVE | Facility: OTHER | Age: 71
End: 2021-02-25

## 2021-02-25 ENCOUNTER — TELEPHONE (OUTPATIENT)
Dept: PHYSICAL MEDICINE AND REHAB | Facility: CLINIC | Age: 71
End: 2021-02-25

## 2021-02-25 ENCOUNTER — IMMUNIZATION (OUTPATIENT)
Dept: PHARMACY | Facility: CLINIC | Age: 71
End: 2021-02-25
Payer: MEDICARE

## 2021-02-25 DIAGNOSIS — Z23 NEED FOR VACCINATION: Primary | ICD-10-CM

## 2021-02-26 ENCOUNTER — OFFICE VISIT (OUTPATIENT)
Dept: PHYSICAL MEDICINE AND REHAB | Facility: CLINIC | Age: 71
End: 2021-02-26
Payer: MEDICARE

## 2021-02-26 ENCOUNTER — PATIENT MESSAGE (OUTPATIENT)
Dept: UROLOGY | Facility: CLINIC | Age: 71
End: 2021-02-26

## 2021-02-26 DIAGNOSIS — G89.29 CHRONIC MIDLINE THORACIC BACK PAIN: ICD-10-CM

## 2021-02-26 DIAGNOSIS — M79.7 FIBROMYALGIA: ICD-10-CM

## 2021-02-26 DIAGNOSIS — M54.2 CHRONIC NECK PAIN: ICD-10-CM

## 2021-02-26 DIAGNOSIS — M54.6 CHRONIC MIDLINE THORACIC BACK PAIN: ICD-10-CM

## 2021-02-26 DIAGNOSIS — M94.0 COSTOCHONDRITIS: ICD-10-CM

## 2021-02-26 DIAGNOSIS — E66.01 MORBID OBESITY WITH BMI OF 40.0-44.9, ADULT: ICD-10-CM

## 2021-02-26 DIAGNOSIS — M54.50 CHRONIC MIDLINE LOW BACK PAIN WITHOUT SCIATICA: Primary | ICD-10-CM

## 2021-02-26 DIAGNOSIS — M47.816 SPONDYLOSIS OF LUMBAR REGION WITHOUT MYELOPATHY OR RADICULOPATHY: ICD-10-CM

## 2021-02-26 DIAGNOSIS — Z79.891 CHRONIC USE OF OPIATE FOR THERAPEUTIC PURPOSE: ICD-10-CM

## 2021-02-26 DIAGNOSIS — M47.812 SPONDYLOSIS OF CERVICAL REGION WITHOUT MYELOPATHY OR RADICULOPATHY: ICD-10-CM

## 2021-02-26 DIAGNOSIS — G89.29 CHRONIC NECK PAIN: ICD-10-CM

## 2021-02-26 DIAGNOSIS — G56.03 BILATERAL CARPAL TUNNEL SYNDROME: ICD-10-CM

## 2021-02-26 DIAGNOSIS — G89.29 CHRONIC MIDLINE LOW BACK PAIN WITHOUT SCIATICA: Primary | ICD-10-CM

## 2021-02-26 PROCEDURE — 99442 PR PHYSICIAN TELEPHONE EVALUATION 11-20 MIN: CPT | Mod: 95,,, | Performed by: PHYSICAL MEDICINE & REHABILITATION

## 2021-02-26 PROCEDURE — 99442 PR PHYSICIAN TELEPHONE EVALUATION 11-20 MIN: ICD-10-PCS | Mod: 95,,, | Performed by: PHYSICAL MEDICINE & REHABILITATION

## 2021-03-01 ENCOUNTER — LAB VISIT (OUTPATIENT)
Dept: LAB | Facility: HOSPITAL | Age: 71
End: 2021-03-01
Attending: INTERNAL MEDICINE
Payer: MEDICARE

## 2021-03-01 ENCOUNTER — TELEPHONE (OUTPATIENT)
Dept: NEPHROLOGY | Facility: CLINIC | Age: 71
End: 2021-03-01

## 2021-03-01 DIAGNOSIS — N31.9 NEUROGENIC DYSFUNCTION OF THE URINARY BLADDER: Primary | ICD-10-CM

## 2021-03-01 DIAGNOSIS — I15.2 ENDOCRINE HYPERTENSION: ICD-10-CM

## 2021-03-01 LAB
ALBUMIN SERPL BCP-MCNC: 2.4 G/DL (ref 3.5–5.2)
ANION GAP SERPL CALC-SCNC: 8 MMOL/L (ref 8–16)
BASOPHILS # BLD AUTO: 0.04 K/UL (ref 0–0.2)
BASOPHILS NFR BLD: 0.6 % (ref 0–1.9)
BUN SERPL-MCNC: 23 MG/DL (ref 8–23)
CALCIUM SERPL-MCNC: 8.5 MG/DL (ref 8.7–10.5)
CHLORIDE SERPL-SCNC: 100 MMOL/L (ref 95–110)
CO2 SERPL-SCNC: 23 MMOL/L (ref 23–29)
CREAT SERPL-MCNC: 2.1 MG/DL (ref 0.5–1.4)
CREAT UR-MCNC: 36 MG/DL (ref 15–325)
DIFFERENTIAL METHOD: ABNORMAL
EOSINOPHIL # BLD AUTO: 0.2 K/UL (ref 0–0.5)
EOSINOPHIL NFR BLD: 3.4 % (ref 0–8)
ERYTHROCYTE [DISTWIDTH] IN BLOOD BY AUTOMATED COUNT: 13.7 % (ref 11.5–14.5)
EST. GFR  (AFRICAN AMERICAN): 27.3 ML/MIN/1.73 M^2
EST. GFR  (NON AFRICAN AMERICAN): 23.7 ML/MIN/1.73 M^2
GLUCOSE SERPL-MCNC: 491 MG/DL (ref 70–110)
HCT VFR BLD AUTO: 35.3 % (ref 37–48.5)
HGB BLD-MCNC: 11.2 G/DL (ref 12–16)
IMM GRANULOCYTES # BLD AUTO: 0.03 K/UL (ref 0–0.04)
IMM GRANULOCYTES NFR BLD AUTO: 0.4 % (ref 0–0.5)
LYMPHOCYTES # BLD AUTO: 2.1 K/UL (ref 1–4.8)
LYMPHOCYTES NFR BLD: 29.9 % (ref 18–48)
MCH RBC QN AUTO: 27.8 PG (ref 27–31)
MCHC RBC AUTO-ENTMCNC: 31.7 G/DL (ref 32–36)
MCV RBC AUTO: 88 FL (ref 82–98)
MONOCYTES # BLD AUTO: 0.5 K/UL (ref 0.3–1)
MONOCYTES NFR BLD: 7.7 % (ref 4–15)
NEUTROPHILS # BLD AUTO: 4 K/UL (ref 1.8–7.7)
NEUTROPHILS NFR BLD: 58 % (ref 38–73)
NRBC BLD-RTO: 0 /100 WBC
PHOSPHATE SERPL-MCNC: 4.1 MG/DL (ref 2.7–4.5)
PLATELET # BLD AUTO: 315 K/UL (ref 150–350)
PMV BLD AUTO: 10.4 FL (ref 9.2–12.9)
POTASSIUM SERPL-SCNC: 4.6 MMOL/L (ref 3.5–5.1)
PROT UR-MCNC: 160 MG/DL (ref 0–15)
PROT/CREAT UR: 4.44 MG/G{CREAT} (ref 0–0.2)
PTH-INTACT SERPL-MCNC: 65 PG/ML (ref 9–77)
RBC # BLD AUTO: 4.03 M/UL (ref 4–5.4)
SODIUM SERPL-SCNC: 131 MMOL/L (ref 136–145)
WBC # BLD AUTO: 6.98 K/UL (ref 3.9–12.7)

## 2021-03-01 PROCEDURE — 83970 ASSAY OF PARATHORMONE: CPT

## 2021-03-01 PROCEDURE — 80069 RENAL FUNCTION PANEL: CPT

## 2021-03-01 PROCEDURE — 84156 ASSAY OF PROTEIN URINE: CPT

## 2021-03-01 PROCEDURE — 85025 COMPLETE CBC W/AUTO DIFF WBC: CPT

## 2021-03-02 DIAGNOSIS — N32.89 BLADDER SPASMS: Primary | ICD-10-CM

## 2021-03-03 RX ORDER — OXYBUTYNIN CHLORIDE 10 MG/1
10 TABLET, EXTENDED RELEASE ORAL DAILY
Qty: 30 TABLET | Refills: 0 | Status: SHIPPED | OUTPATIENT
Start: 2021-03-03 | End: 2021-03-30

## 2021-03-07 ENCOUNTER — PATIENT MESSAGE (OUTPATIENT)
Dept: NEPHROLOGY | Facility: CLINIC | Age: 71
End: 2021-03-07

## 2021-03-19 ENCOUNTER — PATIENT MESSAGE (OUTPATIENT)
Dept: PHYSICAL MEDICINE AND REHAB | Facility: CLINIC | Age: 71
End: 2021-03-19

## 2021-03-19 DIAGNOSIS — M79.7 FIBROMYALGIA: ICD-10-CM

## 2021-03-19 DIAGNOSIS — M54.2 CHRONIC NECK PAIN: ICD-10-CM

## 2021-03-19 DIAGNOSIS — G89.29 CHRONIC MIDLINE LOW BACK PAIN WITHOUT SCIATICA: ICD-10-CM

## 2021-03-19 DIAGNOSIS — G89.29 CHRONIC NECK PAIN: ICD-10-CM

## 2021-03-19 DIAGNOSIS — M54.50 CHRONIC MIDLINE LOW BACK PAIN WITHOUT SCIATICA: ICD-10-CM

## 2021-03-19 RX ORDER — HYDROCODONE BITARTRATE AND ACETAMINOPHEN 10; 325 MG/1; MG/1
1 TABLET ORAL EVERY 6 HOURS PRN
Qty: 120 TABLET | Refills: 0 | Status: SHIPPED | OUTPATIENT
Start: 2021-03-21 | End: 2021-04-19 | Stop reason: SDUPTHER

## 2021-03-25 ENCOUNTER — IMMUNIZATION (OUTPATIENT)
Dept: PHARMACY | Facility: CLINIC | Age: 71
End: 2021-03-25
Payer: MEDICARE

## 2021-03-25 DIAGNOSIS — Z23 NEED FOR VACCINATION: Primary | ICD-10-CM

## 2021-04-03 PROCEDURE — G0179 PR HOME HEALTH MD RECERTIFICATION: ICD-10-PCS | Mod: ,,, | Performed by: INTERNAL MEDICINE

## 2021-04-03 PROCEDURE — G0179 MD RECERTIFICATION HHA PT: HCPCS | Mod: ,,, | Performed by: INTERNAL MEDICINE

## 2021-04-05 ENCOUNTER — PATIENT OUTREACH (OUTPATIENT)
Dept: HOME HEALTH SERVICES | Facility: HOSPITAL | Age: 71
End: 2021-04-05

## 2021-04-05 ENCOUNTER — PATIENT MESSAGE (OUTPATIENT)
Dept: ADMINISTRATIVE | Facility: HOSPITAL | Age: 71
End: 2021-04-05

## 2021-04-07 ENCOUNTER — EXTERNAL HOME HEALTH (OUTPATIENT)
Dept: HOME HEALTH SERVICES | Facility: HOSPITAL | Age: 71
End: 2021-04-07
Payer: MEDICARE

## 2021-04-12 ENCOUNTER — PATIENT MESSAGE (OUTPATIENT)
Dept: HEMATOLOGY/ONCOLOGY | Facility: CLINIC | Age: 71
End: 2021-04-12

## 2021-04-12 ENCOUNTER — PATIENT OUTREACH (OUTPATIENT)
Dept: ADMINISTRATIVE | Facility: OTHER | Age: 71
End: 2021-04-12

## 2021-04-12 DIAGNOSIS — E11.9 TYPE 2 DIABETES MELLITUS WITHOUT COMPLICATION, UNSPECIFIED WHETHER LONG TERM INSULIN USE: Primary | ICD-10-CM

## 2021-04-15 ENCOUNTER — PATIENT MESSAGE (OUTPATIENT)
Dept: UROLOGY | Facility: CLINIC | Age: 71
End: 2021-04-15

## 2021-04-19 ENCOUNTER — PATIENT MESSAGE (OUTPATIENT)
Dept: PHYSICAL MEDICINE AND REHAB | Facility: CLINIC | Age: 71
End: 2021-04-19

## 2021-04-19 DIAGNOSIS — G89.29 CHRONIC NECK PAIN: ICD-10-CM

## 2021-04-19 DIAGNOSIS — M54.50 CHRONIC MIDLINE LOW BACK PAIN WITHOUT SCIATICA: ICD-10-CM

## 2021-04-19 DIAGNOSIS — M54.2 CHRONIC NECK PAIN: ICD-10-CM

## 2021-04-19 DIAGNOSIS — G89.29 CHRONIC MIDLINE LOW BACK PAIN WITHOUT SCIATICA: ICD-10-CM

## 2021-04-19 DIAGNOSIS — M79.7 FIBROMYALGIA: ICD-10-CM

## 2021-04-19 RX ORDER — HYDROCODONE BITARTRATE AND ACETAMINOPHEN 10; 325 MG/1; MG/1
1 TABLET ORAL EVERY 6 HOURS PRN
Qty: 120 TABLET | Refills: 0 | Status: SHIPPED | OUTPATIENT
Start: 2021-04-20 | End: 2021-06-09 | Stop reason: SDUPTHER

## 2021-04-20 ENCOUNTER — PES CALL (OUTPATIENT)
Dept: ADMINISTRATIVE | Facility: CLINIC | Age: 71
End: 2021-04-20

## 2021-04-22 ENCOUNTER — PES CALL (OUTPATIENT)
Dept: ADMINISTRATIVE | Facility: CLINIC | Age: 71
End: 2021-04-22

## 2021-04-26 RX ORDER — ERGOCALCIFEROL 1.25 MG/1
50000 CAPSULE ORAL
Qty: 12 CAPSULE | Refills: 3 | Status: SHIPPED | OUTPATIENT
Start: 2021-04-26 | End: 2022-03-09

## 2021-05-04 ENCOUNTER — PATIENT MESSAGE (OUTPATIENT)
Dept: PHYSICAL MEDICINE AND REHAB | Facility: CLINIC | Age: 71
End: 2021-05-04

## 2021-05-04 ENCOUNTER — PATIENT MESSAGE (OUTPATIENT)
Dept: UROLOGY | Facility: CLINIC | Age: 71
End: 2021-05-04

## 2021-05-04 DIAGNOSIS — M79.7 FIBROMYALGIA: ICD-10-CM

## 2021-05-04 DIAGNOSIS — M54.50 CHRONIC MIDLINE LOW BACK PAIN WITHOUT SCIATICA: ICD-10-CM

## 2021-05-04 DIAGNOSIS — G89.29 CHRONIC MIDLINE LOW BACK PAIN WITHOUT SCIATICA: ICD-10-CM

## 2021-05-04 DIAGNOSIS — G89.29 CHRONIC NECK PAIN: ICD-10-CM

## 2021-05-04 DIAGNOSIS — M54.2 CHRONIC NECK PAIN: ICD-10-CM

## 2021-05-05 ENCOUNTER — PATIENT MESSAGE (OUTPATIENT)
Dept: HEMATOLOGY/ONCOLOGY | Facility: CLINIC | Age: 71
End: 2021-05-05

## 2021-05-05 RX ORDER — METHOCARBAMOL 750 MG/1
750-1500 TABLET, FILM COATED ORAL 3 TIMES DAILY PRN
Qty: 180 TABLET | Refills: 1 | Status: SHIPPED | OUTPATIENT
Start: 2021-05-05 | End: 2021-08-09 | Stop reason: SDUPTHER

## 2021-05-13 ENCOUNTER — TELEPHONE (OUTPATIENT)
Dept: PHYSICAL MEDICINE AND REHAB | Facility: CLINIC | Age: 71
End: 2021-05-13

## 2021-05-13 ENCOUNTER — TELEPHONE (OUTPATIENT)
Dept: INTERNAL MEDICINE | Facility: CLINIC | Age: 71
End: 2021-05-13

## 2021-05-14 ENCOUNTER — HOSPITAL ENCOUNTER (INPATIENT)
Facility: HOSPITAL | Age: 71
LOS: 19 days | Discharge: HOME HEALTH | DRG: 660 | End: 2021-06-02
Attending: EMERGENCY MEDICINE | Admitting: STUDENT IN AN ORGANIZED HEALTH CARE EDUCATION/TRAINING PROGRAM
Payer: MEDICARE

## 2021-05-14 ENCOUNTER — TELEPHONE (OUTPATIENT)
Dept: NEPHROLOGY | Facility: CLINIC | Age: 71
End: 2021-05-14

## 2021-05-14 ENCOUNTER — TELEPHONE (OUTPATIENT)
Dept: UROLOGY | Facility: CLINIC | Age: 71
End: 2021-05-14

## 2021-05-14 DIAGNOSIS — R34 ANURIA: ICD-10-CM

## 2021-05-14 DIAGNOSIS — N17.9 AKI (ACUTE KIDNEY INJURY): ICD-10-CM

## 2021-05-14 DIAGNOSIS — E87.20 METABOLIC ACIDOSIS: ICD-10-CM

## 2021-05-14 DIAGNOSIS — N13.30 HYDRONEPHROSIS, UNSPECIFIED HYDRONEPHROSIS TYPE: ICD-10-CM

## 2021-05-14 DIAGNOSIS — N13.30 HYDRONEPHROSIS OF LEFT KIDNEY: ICD-10-CM

## 2021-05-14 DIAGNOSIS — N18.4 CKD STAGE 4 DUE TO TYPE 2 DIABETES MELLITUS: ICD-10-CM

## 2021-05-14 DIAGNOSIS — N18.4 CKD (CHRONIC KIDNEY DISEASE) STAGE 4, GFR 15-29 ML/MIN: ICD-10-CM

## 2021-05-14 DIAGNOSIS — T83.510A URINARY TRACT INFECTION ASSOCIATED WITH CYSTOSTOMY CATHETER, INITIAL ENCOUNTER: ICD-10-CM

## 2021-05-14 DIAGNOSIS — R06.02 SOB (SHORTNESS OF BREATH): ICD-10-CM

## 2021-05-14 DIAGNOSIS — J01.90 ACUTE BACTERIAL SINUSITIS: ICD-10-CM

## 2021-05-14 DIAGNOSIS — R07.9 CHEST PAIN: ICD-10-CM

## 2021-05-14 DIAGNOSIS — R07.89 DISCOMFORT IN CHEST: ICD-10-CM

## 2021-05-14 DIAGNOSIS — B96.89 ACUTE BACTERIAL SINUSITIS: ICD-10-CM

## 2021-05-14 DIAGNOSIS — I15.2 HYPERTENSION ASSOCIATED WITH DIABETES: ICD-10-CM

## 2021-05-14 DIAGNOSIS — E11.22 CKD STAGE 4 DUE TO TYPE 2 DIABETES MELLITUS: ICD-10-CM

## 2021-05-14 DIAGNOSIS — E11.59 HYPERTENSION ASSOCIATED WITH DIABETES: ICD-10-CM

## 2021-05-14 DIAGNOSIS — E87.1 HYPONATREMIA: Primary | ICD-10-CM

## 2021-05-14 DIAGNOSIS — R80.9 NEPHROTIC RANGE PROTEINURIA: ICD-10-CM

## 2021-05-14 DIAGNOSIS — R06.02 SHORTNESS OF BREATH: ICD-10-CM

## 2021-05-14 DIAGNOSIS — N39.0 URINARY TRACT INFECTION ASSOCIATED WITH CYSTOSTOMY CATHETER, INITIAL ENCOUNTER: ICD-10-CM

## 2021-05-14 LAB
ALBUMIN SERPL BCP-MCNC: 2 G/DL (ref 3.5–5.2)
ALBUMIN SERPL BCP-MCNC: 2 G/DL (ref 3.5–5.2)
ALBUMIN SERPL BCP-MCNC: 2.1 G/DL (ref 3.5–5.2)
ALP SERPL-CCNC: 211 U/L (ref 55–135)
ALT SERPL W/O P-5'-P-CCNC: 12 U/L (ref 10–44)
ANION GAP SERPL CALC-SCNC: 10 MMOL/L (ref 8–16)
ANION GAP SERPL CALC-SCNC: 13 MMOL/L (ref 8–16)
ANION GAP SERPL CALC-SCNC: 16 MMOL/L (ref 8–16)
AST SERPL-CCNC: 17 U/L (ref 10–40)
BACTERIA #/AREA URNS AUTO: ABNORMAL /HPF
BASOPHILS # BLD AUTO: 0.04 K/UL (ref 0–0.2)
BASOPHILS NFR BLD: 0.2 % (ref 0–1.9)
BILIRUB SERPL-MCNC: 0.5 MG/DL (ref 0.1–1)
BILIRUB UR QL STRIP: NEGATIVE
BNP SERPL-MCNC: 1401 PG/ML (ref 0–99)
BUN SERPL-MCNC: 44 MG/DL (ref 6–30)
BUN SERPL-MCNC: 52 MG/DL (ref 8–23)
BUN SERPL-MCNC: 52 MG/DL (ref 8–23)
BUN SERPL-MCNC: 53 MG/DL (ref 8–23)
CALCIUM SERPL-MCNC: 8.3 MG/DL (ref 8.7–10.5)
CALCIUM SERPL-MCNC: 8.6 MG/DL (ref 8.7–10.5)
CALCIUM SERPL-MCNC: 8.7 MG/DL (ref 8.7–10.5)
CHLORIDE SERPL-SCNC: 101 MMOL/L (ref 95–110)
CHLORIDE SERPL-SCNC: 96 MMOL/L (ref 95–110)
CHLORIDE SERPL-SCNC: 96 MMOL/L (ref 95–110)
CHLORIDE SERPL-SCNC: 98 MMOL/L (ref 95–110)
CLARITY UR REFRACT.AUTO: ABNORMAL
CO2 SERPL-SCNC: 13 MMOL/L (ref 23–29)
CO2 SERPL-SCNC: 14 MMOL/L (ref 23–29)
CO2 SERPL-SCNC: 15 MMOL/L (ref 23–29)
COLOR UR AUTO: YELLOW
CREAT SERPL-MCNC: 4.6 MG/DL (ref 0.5–1.4)
CREAT SERPL-MCNC: 4.6 MG/DL (ref 0.5–1.4)
CREAT SERPL-MCNC: 4.8 MG/DL (ref 0.5–1.4)
CREAT SERPL-MCNC: 4.8 MG/DL (ref 0.5–1.4)
CTP QC/QA: YES
DIFFERENTIAL METHOD: ABNORMAL
EOSINOPHIL # BLD AUTO: 0.2 K/UL (ref 0–0.5)
EOSINOPHIL NFR BLD: 0.8 % (ref 0–8)
ERYTHROCYTE [DISTWIDTH] IN BLOOD BY AUTOMATED COUNT: 13.8 % (ref 11.5–14.5)
EST. GFR  (AFRICAN AMERICAN): 10 ML/MIN/1.73 M^2
EST. GFR  (AFRICAN AMERICAN): 10 ML/MIN/1.73 M^2
EST. GFR  (AFRICAN AMERICAN): 10.6 ML/MIN/1.73 M^2
EST. GFR  (NON AFRICAN AMERICAN): 8.7 ML/MIN/1.73 M^2
EST. GFR  (NON AFRICAN AMERICAN): 8.7 ML/MIN/1.73 M^2
EST. GFR  (NON AFRICAN AMERICAN): 9.2 ML/MIN/1.73 M^2
ESTIMATED AVG GLUCOSE: 226 MG/DL (ref 68–131)
GLUCOSE SERPL-MCNC: 246 MG/DL (ref 70–110)
GLUCOSE SERPL-MCNC: 260 MG/DL (ref 70–110)
GLUCOSE SERPL-MCNC: 281 MG/DL (ref 70–110)
GLUCOSE SERPL-MCNC: 287 MG/DL (ref 70–110)
GLUCOSE UR QL STRIP: NEGATIVE
HBA1C MFR BLD: 9.5 % (ref 4–5.6)
HCT VFR BLD AUTO: 36.4 % (ref 37–48.5)
HCT VFR BLD CALC: 40 %PCV (ref 36–54)
HCV AB SERPL QL IA: NEGATIVE
HGB BLD-MCNC: 12.2 G/DL (ref 12–16)
HGB UR QL STRIP: ABNORMAL
HYALINE CASTS UR QL AUTO: 0 /LPF
IMM GRANULOCYTES # BLD AUTO: 0.74 K/UL (ref 0–0.04)
IMM GRANULOCYTES NFR BLD AUTO: 4 % (ref 0–0.5)
KETONES UR QL STRIP: NEGATIVE
LACTATE SERPL-SCNC: 1.7 MMOL/L (ref 0.5–2.2)
LEUKOCYTE ESTERASE UR QL STRIP: ABNORMAL
LYMPHOCYTES # BLD AUTO: 1.2 K/UL (ref 1–4.8)
LYMPHOCYTES NFR BLD: 6.5 % (ref 18–48)
MCH RBC QN AUTO: 28.2 PG (ref 27–31)
MCHC RBC AUTO-ENTMCNC: 33.5 G/DL (ref 32–36)
MCV RBC AUTO: 84 FL (ref 82–98)
MICROSCOPIC COMMENT: ABNORMAL
MONOCYTES # BLD AUTO: 0.9 K/UL (ref 0.3–1)
MONOCYTES NFR BLD: 5.1 % (ref 4–15)
NEUTROPHILS # BLD AUTO: 15.3 K/UL (ref 1.8–7.7)
NEUTROPHILS NFR BLD: 83.4 % (ref 38–73)
NITRITE UR QL STRIP: NEGATIVE
NRBC BLD-RTO: 0 /100 WBC
OSMOLALITY SERPL: 299 MOSM/KG (ref 275–295)
PH UR STRIP: 6 [PH] (ref 5–8)
PHOSPHATE SERPL-MCNC: 3 MG/DL (ref 2.7–4.5)
PHOSPHATE SERPL-MCNC: 3.1 MG/DL (ref 2.7–4.5)
PLATELET # BLD AUTO: 126 K/UL (ref 150–450)
PMV BLD AUTO: 11 FL (ref 9.2–12.9)
POC IONIZED CALCIUM: 1.05 MMOL/L (ref 1.06–1.42)
POC TCO2 (MEASURED): 15 MMOL/L (ref 23–29)
POCT GLUCOSE: 269 MG/DL (ref 70–110)
POTASSIUM BLD-SCNC: 4.2 MMOL/L (ref 3.5–5.1)
POTASSIUM SERPL-SCNC: 4.5 MMOL/L (ref 3.5–5.1)
POTASSIUM SERPL-SCNC: 4.7 MMOL/L (ref 3.5–5.1)
POTASSIUM SERPL-SCNC: 5 MMOL/L (ref 3.5–5.1)
PROT SERPL-MCNC: 6.3 G/DL (ref 6–8.4)
PROT UR QL STRIP: ABNORMAL
RBC # BLD AUTO: 4.33 M/UL (ref 4–5.4)
RBC #/AREA URNS AUTO: 20 /HPF (ref 0–4)
SAMPLE: ABNORMAL
SARS-COV-2 RDRP RESP QL NAA+PROBE: NEGATIVE
SODIUM BLD-SCNC: 129 MMOL/L (ref 136–145)
SODIUM SERPL-SCNC: 121 MMOL/L (ref 136–145)
SODIUM SERPL-SCNC: 123 MMOL/L (ref 136–145)
SODIUM SERPL-SCNC: 127 MMOL/L (ref 136–145)
SP GR UR STRIP: 1 (ref 1–1.03)
T4 FREE SERPL-MCNC: 0.83 NG/DL (ref 0.71–1.51)
TSH SERPL DL<=0.005 MIU/L-ACNC: 5.2 UIU/ML (ref 0.4–4)
URN SPEC COLLECT METH UR: ABNORMAL
WBC # BLD AUTO: 18.34 K/UL (ref 3.9–12.7)
WBC #/AREA URNS AUTO: >100 /HPF (ref 0–5)
WBC CLUMPS UR QL AUTO: ABNORMAL

## 2021-05-14 PROCEDURE — 83605 ASSAY OF LACTIC ACID: CPT | Performed by: STUDENT IN AN ORGANIZED HEALTH CARE EDUCATION/TRAINING PROGRAM

## 2021-05-14 PROCEDURE — 83036 HEMOGLOBIN GLYCOSYLATED A1C: CPT | Performed by: STUDENT IN AN ORGANIZED HEALTH CARE EDUCATION/TRAINING PROGRAM

## 2021-05-14 PROCEDURE — 80047 BASIC METABLC PNL IONIZED CA: CPT

## 2021-05-14 PROCEDURE — 99285 EMERGENCY DEPT VISIT HI MDM: CPT | Mod: 25

## 2021-05-14 PROCEDURE — 96365 THER/PROPH/DIAG IV INF INIT: CPT

## 2021-05-14 PROCEDURE — 99285 PR EMERGENCY DEPT VISIT,LEVEL V: ICD-10-PCS | Mod: 25,CS,, | Performed by: EMERGENCY MEDICINE

## 2021-05-14 PROCEDURE — U0002 COVID-19 LAB TEST NON-CDC: HCPCS | Performed by: EMERGENCY MEDICINE

## 2021-05-14 PROCEDURE — 85025 COMPLETE CBC W/AUTO DIFF WBC: CPT | Performed by: STUDENT IN AN ORGANIZED HEALTH CARE EDUCATION/TRAINING PROGRAM

## 2021-05-14 PROCEDURE — 25000003 PHARM REV CODE 250: Performed by: STUDENT IN AN ORGANIZED HEALTH CARE EDUCATION/TRAINING PROGRAM

## 2021-05-14 PROCEDURE — 51705 CHANGE OF BLADDER TUBE: CPT | Mod: ,,, | Performed by: EMERGENCY MEDICINE

## 2021-05-14 PROCEDURE — 83930 ASSAY OF BLOOD OSMOLALITY: CPT | Performed by: STUDENT IN AN ORGANIZED HEALTH CARE EDUCATION/TRAINING PROGRAM

## 2021-05-14 PROCEDURE — 93010 ELECTROCARDIOGRAM REPORT: CPT | Mod: ,,, | Performed by: INTERNAL MEDICINE

## 2021-05-14 PROCEDURE — 11000001 HC ACUTE MED/SURG PRIVATE ROOM

## 2021-05-14 PROCEDURE — 63600175 PHARM REV CODE 636 W HCPCS: Performed by: STUDENT IN AN ORGANIZED HEALTH CARE EDUCATION/TRAINING PROGRAM

## 2021-05-14 PROCEDURE — 82565 ASSAY OF CREATININE: CPT

## 2021-05-14 PROCEDURE — 81001 URINALYSIS AUTO W/SCOPE: CPT | Performed by: STUDENT IN AN ORGANIZED HEALTH CARE EDUCATION/TRAINING PROGRAM

## 2021-05-14 PROCEDURE — 87077 CULTURE AEROBIC IDENTIFY: CPT | Performed by: STUDENT IN AN ORGANIZED HEALTH CARE EDUCATION/TRAINING PROGRAM

## 2021-05-14 PROCEDURE — 87088 URINE BACTERIA CULTURE: CPT | Performed by: STUDENT IN AN ORGANIZED HEALTH CARE EDUCATION/TRAINING PROGRAM

## 2021-05-14 PROCEDURE — 93005 ELECTROCARDIOGRAM TRACING: CPT

## 2021-05-14 PROCEDURE — C9399 UNCLASSIFIED DRUGS OR BIOLOG: HCPCS | Performed by: STUDENT IN AN ORGANIZED HEALTH CARE EDUCATION/TRAINING PROGRAM

## 2021-05-14 PROCEDURE — 80053 COMPREHEN METABOLIC PANEL: CPT | Performed by: STUDENT IN AN ORGANIZED HEALTH CARE EDUCATION/TRAINING PROGRAM

## 2021-05-14 PROCEDURE — 36415 COLL VENOUS BLD VENIPUNCTURE: CPT | Performed by: STUDENT IN AN ORGANIZED HEALTH CARE EDUCATION/TRAINING PROGRAM

## 2021-05-14 PROCEDURE — 84439 ASSAY OF FREE THYROXINE: CPT | Performed by: STUDENT IN AN ORGANIZED HEALTH CARE EDUCATION/TRAINING PROGRAM

## 2021-05-14 PROCEDURE — 99223 1ST HOSP IP/OBS HIGH 75: CPT | Mod: AI,,, | Performed by: STUDENT IN AN ORGANIZED HEALTH CARE EDUCATION/TRAINING PROGRAM

## 2021-05-14 PROCEDURE — 86803 HEPATITIS C AB TEST: CPT | Performed by: EMERGENCY MEDICINE

## 2021-05-14 PROCEDURE — 87086 URINE CULTURE/COLONY COUNT: CPT | Performed by: STUDENT IN AN ORGANIZED HEALTH CARE EDUCATION/TRAINING PROGRAM

## 2021-05-14 PROCEDURE — 83880 ASSAY OF NATRIURETIC PEPTIDE: CPT | Performed by: STUDENT IN AN ORGANIZED HEALTH CARE EDUCATION/TRAINING PROGRAM

## 2021-05-14 PROCEDURE — 99223 PR INITIAL HOSPITAL CARE,LEVL III: ICD-10-PCS | Mod: AI,,, | Performed by: STUDENT IN AN ORGANIZED HEALTH CARE EDUCATION/TRAINING PROGRAM

## 2021-05-14 PROCEDURE — 84443 ASSAY THYROID STIM HORMONE: CPT | Performed by: STUDENT IN AN ORGANIZED HEALTH CARE EDUCATION/TRAINING PROGRAM

## 2021-05-14 PROCEDURE — 99285 EMERGENCY DEPT VISIT HI MDM: CPT | Mod: 25,CS,, | Performed by: EMERGENCY MEDICINE

## 2021-05-14 PROCEDURE — 87186 SC STD MICRODIL/AGAR DIL: CPT | Performed by: STUDENT IN AN ORGANIZED HEALTH CARE EDUCATION/TRAINING PROGRAM

## 2021-05-14 PROCEDURE — 93010 EKG 12-LEAD: ICD-10-PCS | Mod: ,,, | Performed by: INTERNAL MEDICINE

## 2021-05-14 PROCEDURE — 80069 RENAL FUNCTION PANEL: CPT | Mod: 91 | Performed by: STUDENT IN AN ORGANIZED HEALTH CARE EDUCATION/TRAINING PROGRAM

## 2021-05-14 PROCEDURE — 51705 PR CHANGE OF BLADDER TUBE,SIMPLE: ICD-10-PCS | Mod: ,,, | Performed by: EMERGENCY MEDICINE

## 2021-05-14 RX ORDER — CLONIDINE HYDROCHLORIDE 0.1 MG/1
0.1 TABLET ORAL 3 TIMES DAILY
Status: DISCONTINUED | OUTPATIENT
Start: 2021-05-14 | End: 2021-05-19

## 2021-05-14 RX ORDER — ONDANSETRON 8 MG/1
8 TABLET, ORALLY DISINTEGRATING ORAL EVERY 8 HOURS PRN
Status: DISCONTINUED | OUTPATIENT
Start: 2021-05-14 | End: 2021-06-02 | Stop reason: HOSPADM

## 2021-05-14 RX ORDER — ACETAMINOPHEN 325 MG/1
650 TABLET ORAL EVERY 8 HOURS PRN
Status: DISCONTINUED | OUTPATIENT
Start: 2021-05-14 | End: 2021-06-02 | Stop reason: HOSPADM

## 2021-05-14 RX ORDER — VENLAFAXINE HYDROCHLORIDE 75 MG/1
150 CAPSULE, EXTENDED RELEASE ORAL DAILY
Status: DISCONTINUED | OUTPATIENT
Start: 2021-05-15 | End: 2021-06-02 | Stop reason: HOSPADM

## 2021-05-14 RX ORDER — GLUCAGON 1 MG
1 KIT INJECTION
Status: DISCONTINUED | OUTPATIENT
Start: 2021-05-14 | End: 2021-06-02 | Stop reason: HOSPADM

## 2021-05-14 RX ORDER — ROSUVASTATIN CALCIUM 20 MG/1
20 TABLET, COATED ORAL DAILY
Status: DISCONTINUED | OUTPATIENT
Start: 2021-05-15 | End: 2021-05-24 | Stop reason: ALTCHOICE

## 2021-05-14 RX ORDER — AZELASTINE 1 MG/ML
1 SPRAY, METERED NASAL 2 TIMES DAILY
Status: DISCONTINUED | OUTPATIENT
Start: 2021-05-14 | End: 2021-06-02 | Stop reason: HOSPADM

## 2021-05-14 RX ORDER — SODIUM CHLORIDE 0.9 % (FLUSH) 0.9 %
10 SYRINGE (ML) INJECTION
Status: CANCELLED | OUTPATIENT
Start: 2021-05-14

## 2021-05-14 RX ORDER — PROPRANOLOL HYDROCHLORIDE 80 MG/1
80 CAPSULE, EXTENDED RELEASE ORAL DAILY
Status: DISCONTINUED | OUTPATIENT
Start: 2021-05-15 | End: 2021-06-02 | Stop reason: HOSPADM

## 2021-05-14 RX ORDER — OXYBUTYNIN CHLORIDE 10 MG/1
10 TABLET, EXTENDED RELEASE ORAL DAILY
Status: DISCONTINUED | OUTPATIENT
Start: 2021-05-15 | End: 2021-06-02 | Stop reason: HOSPADM

## 2021-05-14 RX ORDER — CETIRIZINE HYDROCHLORIDE 5 MG/1
5 TABLET ORAL NIGHTLY
Status: DISCONTINUED | OUTPATIENT
Start: 2021-05-14 | End: 2021-06-02 | Stop reason: HOSPADM

## 2021-05-14 RX ORDER — PROMETHAZINE HYDROCHLORIDE 25 MG/1
25 TABLET ORAL EVERY 6 HOURS PRN
Status: DISCONTINUED | OUTPATIENT
Start: 2021-05-14 | End: 2021-06-02 | Stop reason: HOSPADM

## 2021-05-14 RX ORDER — SODIUM CHLORIDE 0.9 % (FLUSH) 0.9 %
10 SYRINGE (ML) INJECTION
Status: DISCONTINUED | OUTPATIENT
Start: 2021-05-14 | End: 2021-06-02 | Stop reason: HOSPADM

## 2021-05-14 RX ORDER — IBUPROFEN 200 MG
24 TABLET ORAL
Status: DISCONTINUED | OUTPATIENT
Start: 2021-05-14 | End: 2021-06-02 | Stop reason: HOSPADM

## 2021-05-14 RX ORDER — AMLODIPINE BESYLATE 10 MG/1
10 TABLET ORAL DAILY
Status: DISCONTINUED | OUTPATIENT
Start: 2021-05-15 | End: 2021-05-22

## 2021-05-14 RX ORDER — LEVOTHYROXINE SODIUM 50 UG/1
50 TABLET ORAL DAILY
Status: DISCONTINUED | OUTPATIENT
Start: 2021-05-15 | End: 2021-05-17

## 2021-05-14 RX ORDER — INSULIN ASPART 100 [IU]/ML
0-5 INJECTION, SOLUTION INTRAVENOUS; SUBCUTANEOUS
Status: DISCONTINUED | OUTPATIENT
Start: 2021-05-14 | End: 2021-05-26

## 2021-05-14 RX ORDER — IBUPROFEN 200 MG
16 TABLET ORAL
Status: DISCONTINUED | OUTPATIENT
Start: 2021-05-14 | End: 2021-06-02 | Stop reason: HOSPADM

## 2021-05-14 RX ADMIN — CLONIDINE HYDROCHLORIDE 0.1 MG: 0.1 TABLET ORAL at 11:05

## 2021-05-14 RX ADMIN — VANCOMYCIN HYDROCHLORIDE 2000 MG: 10 INJECTION, POWDER, LYOPHILIZED, FOR SOLUTION INTRAVENOUS at 02:05

## 2021-05-14 RX ADMIN — SODIUM CHLORIDE, SODIUM LACTATE, POTASSIUM CHLORIDE, AND CALCIUM CHLORIDE 1000 ML: .6; .31; .03; .02 INJECTION, SOLUTION INTRAVENOUS at 02:05

## 2021-05-14 RX ADMIN — INSULIN DETEMIR 15 UNITS: 100 INJECTION, SOLUTION SUBCUTANEOUS at 11:05

## 2021-05-14 RX ADMIN — PIPERACILLIN SODIUM AND TAZOBACTAM SODIUM 4.5 G: 4; .5 INJECTION, POWDER, FOR SOLUTION INTRAVENOUS at 03:05

## 2021-05-14 RX ADMIN — AZELASTINE HYDROCHLORIDE 137 MCG: 137 SPRAY, METERED NASAL at 11:05

## 2021-05-14 RX ADMIN — CETIRIZINE HYDROCHLORIDE 5 MG: 5 TABLET ORAL at 11:05

## 2021-05-15 ENCOUNTER — ANESTHESIA (OUTPATIENT)
Dept: SURGERY | Facility: HOSPITAL | Age: 71
DRG: 660 | End: 2021-05-15
Payer: MEDICARE

## 2021-05-15 ENCOUNTER — ANESTHESIA EVENT (OUTPATIENT)
Dept: SURGERY | Facility: HOSPITAL | Age: 71
DRG: 660 | End: 2021-05-15
Payer: MEDICARE

## 2021-05-15 LAB
ALBUMIN SERPL BCP-MCNC: 1.7 G/DL (ref 3.5–5.2)
ALBUMIN SERPL BCP-MCNC: 1.8 G/DL (ref 3.5–5.2)
ALBUMIN SERPL BCP-MCNC: 1.9 G/DL (ref 3.5–5.2)
ALP SERPL-CCNC: 184 U/L (ref 55–135)
ALT SERPL W/O P-5'-P-CCNC: 14 U/L (ref 10–44)
ANION GAP SERPL CALC-SCNC: 13 MMOL/L (ref 8–16)
ANION GAP SERPL CALC-SCNC: 14 MMOL/L (ref 8–16)
ANION GAP SERPL CALC-SCNC: 14 MMOL/L (ref 8–16)
AST SERPL-CCNC: 19 U/L (ref 10–40)
BASOPHILS # BLD AUTO: 0.02 K/UL (ref 0–0.2)
BASOPHILS NFR BLD: 0.1 % (ref 0–1.9)
BILIRUB SERPL-MCNC: 0.4 MG/DL (ref 0.1–1)
BUN SERPL-MCNC: 62 MG/DL (ref 8–23)
BUN SERPL-MCNC: 63 MG/DL (ref 8–23)
BUN SERPL-MCNC: 66 MG/DL (ref 8–23)
CALCIUM SERPL-MCNC: 8 MG/DL (ref 8.7–10.5)
CALCIUM SERPL-MCNC: 8 MG/DL (ref 8.7–10.5)
CALCIUM SERPL-MCNC: 8.4 MG/DL (ref 8.7–10.5)
CHLORIDE SERPL-SCNC: 97 MMOL/L (ref 95–110)
CHLORIDE SERPL-SCNC: 97 MMOL/L (ref 95–110)
CHLORIDE SERPL-SCNC: 98 MMOL/L (ref 95–110)
CO2 SERPL-SCNC: 12 MMOL/L (ref 23–29)
CO2 SERPL-SCNC: 12 MMOL/L (ref 23–29)
CO2 SERPL-SCNC: 13 MMOL/L (ref 23–29)
CREAT SERPL-MCNC: 5 MG/DL (ref 0.5–1.4)
CREAT SERPL-MCNC: 5 MG/DL (ref 0.5–1.4)
CREAT SERPL-MCNC: 5.3 MG/DL (ref 0.5–1.4)
DIFFERENTIAL METHOD: ABNORMAL
EOSINOPHIL # BLD AUTO: 0.4 K/UL (ref 0–0.5)
EOSINOPHIL NFR BLD: 2.3 % (ref 0–8)
ERYTHROCYTE [DISTWIDTH] IN BLOOD BY AUTOMATED COUNT: 14.3 % (ref 11.5–14.5)
EST. GFR  (AFRICAN AMERICAN): 8.9 ML/MIN/1.73 M^2
EST. GFR  (AFRICAN AMERICAN): 9.6 ML/MIN/1.73 M^2
EST. GFR  (AFRICAN AMERICAN): 9.6 ML/MIN/1.73 M^2
EST. GFR  (NON AFRICAN AMERICAN): 7.7 ML/MIN/1.73 M^2
EST. GFR  (NON AFRICAN AMERICAN): 8.3 ML/MIN/1.73 M^2
EST. GFR  (NON AFRICAN AMERICAN): 8.3 ML/MIN/1.73 M^2
GLUCOSE SERPL-MCNC: 145 MG/DL (ref 70–110)
GLUCOSE SERPL-MCNC: 187 MG/DL (ref 70–110)
GLUCOSE SERPL-MCNC: 202 MG/DL (ref 70–110)
HCT VFR BLD AUTO: 35.6 % (ref 37–48.5)
HGB BLD-MCNC: 12.1 G/DL (ref 12–16)
IMM GRANULOCYTES # BLD AUTO: 0.46 K/UL (ref 0–0.04)
IMM GRANULOCYTES NFR BLD AUTO: 2.8 % (ref 0–0.5)
LYMPHOCYTES # BLD AUTO: 1.8 K/UL (ref 1–4.8)
LYMPHOCYTES NFR BLD: 10.8 % (ref 18–48)
MAGNESIUM SERPL-MCNC: 1.7 MG/DL (ref 1.6–2.6)
MCH RBC QN AUTO: 28.4 PG (ref 27–31)
MCHC RBC AUTO-ENTMCNC: 34 G/DL (ref 32–36)
MCV RBC AUTO: 84 FL (ref 82–98)
MONOCYTES # BLD AUTO: 1.5 K/UL (ref 0.3–1)
MONOCYTES NFR BLD: 8.7 % (ref 4–15)
NEUTROPHILS # BLD AUTO: 12.6 K/UL (ref 1.8–7.7)
NEUTROPHILS NFR BLD: 75.3 % (ref 38–73)
NRBC BLD-RTO: 0 /100 WBC
OSMOLALITY SERPL: 289 MOSM/KG (ref 275–295)
PHOSPHATE SERPL-MCNC: 3.4 MG/DL (ref 2.7–4.5)
PHOSPHATE SERPL-MCNC: 3.7 MG/DL (ref 2.7–4.5)
PHOSPHATE SERPL-MCNC: 3.7 MG/DL (ref 2.7–4.5)
PLATELET # BLD AUTO: 99 K/UL (ref 150–450)
PLATELET BLD QL SMEAR: ABNORMAL
PMV BLD AUTO: 11.7 FL (ref 9.2–12.9)
POCT GLUCOSE: 145 MG/DL (ref 70–110)
POCT GLUCOSE: 172 MG/DL (ref 70–110)
POCT GLUCOSE: 205 MG/DL (ref 70–110)
POCT GLUCOSE: 208 MG/DL (ref 70–110)
POCT GLUCOSE: 228 MG/DL (ref 70–110)
POTASSIUM SERPL-SCNC: 5 MMOL/L (ref 3.5–5.1)
POTASSIUM SERPL-SCNC: 5.5 MMOL/L (ref 3.5–5.1)
POTASSIUM SERPL-SCNC: 5.7 MMOL/L (ref 3.5–5.1)
PROT SERPL-MCNC: 6 G/DL (ref 6–8.4)
RBC # BLD AUTO: 4.26 M/UL (ref 4–5.4)
SODIUM SERPL-SCNC: 122 MMOL/L (ref 136–145)
SODIUM SERPL-SCNC: 124 MMOL/L (ref 136–145)
SODIUM SERPL-SCNC: 124 MMOL/L (ref 136–145)
WBC # BLD AUTO: 16.72 K/UL (ref 3.9–12.7)

## 2021-05-15 PROCEDURE — 36000707: Performed by: UROLOGY

## 2021-05-15 PROCEDURE — 80069 RENAL FUNCTION PANEL: CPT | Mod: 91 | Performed by: STUDENT IN AN ORGANIZED HEALTH CARE EDUCATION/TRAINING PROGRAM

## 2021-05-15 PROCEDURE — 63600175 PHARM REV CODE 636 W HCPCS: Performed by: STUDENT IN AN ORGANIZED HEALTH CARE EDUCATION/TRAINING PROGRAM

## 2021-05-15 PROCEDURE — C1769 GUIDE WIRE: HCPCS | Performed by: UROLOGY

## 2021-05-15 PROCEDURE — 25000003 PHARM REV CODE 250: Performed by: NURSE ANESTHETIST, CERTIFIED REGISTERED

## 2021-05-15 PROCEDURE — 80053 COMPREHEN METABOLIC PANEL: CPT | Performed by: STUDENT IN AN ORGANIZED HEALTH CARE EDUCATION/TRAINING PROGRAM

## 2021-05-15 PROCEDURE — 52332 PR CYSTOSCOPY,INSERT URETERAL STENT: ICD-10-PCS | Mod: LT,,, | Performed by: UROLOGY

## 2021-05-15 PROCEDURE — 83735 ASSAY OF MAGNESIUM: CPT | Performed by: STUDENT IN AN ORGANIZED HEALTH CARE EDUCATION/TRAINING PROGRAM

## 2021-05-15 PROCEDURE — 63600175 PHARM REV CODE 636 W HCPCS: Performed by: NURSE ANESTHETIST, CERTIFIED REGISTERED

## 2021-05-15 PROCEDURE — 25000003 PHARM REV CODE 250: Performed by: STUDENT IN AN ORGANIZED HEALTH CARE EDUCATION/TRAINING PROGRAM

## 2021-05-15 PROCEDURE — D9220A PRA ANESTHESIA: Mod: ANES,,, | Performed by: STUDENT IN AN ORGANIZED HEALTH CARE EDUCATION/TRAINING PROGRAM

## 2021-05-15 PROCEDURE — 36000706: Performed by: UROLOGY

## 2021-05-15 PROCEDURE — 20600001 HC STEP DOWN PRIVATE ROOM

## 2021-05-15 PROCEDURE — 83930 ASSAY OF BLOOD OSMOLALITY: CPT | Performed by: STUDENT IN AN ORGANIZED HEALTH CARE EDUCATION/TRAINING PROGRAM

## 2021-05-15 PROCEDURE — 37000008 HC ANESTHESIA 1ST 15 MINUTES: Performed by: UROLOGY

## 2021-05-15 PROCEDURE — 99233 PR SUBSEQUENT HOSPITAL CARE,LEVL III: ICD-10-PCS | Mod: ,,, | Performed by: STUDENT IN AN ORGANIZED HEALTH CARE EDUCATION/TRAINING PROGRAM

## 2021-05-15 PROCEDURE — 25000003 PHARM REV CODE 250: Performed by: HOSPITALIST

## 2021-05-15 PROCEDURE — 87088 URINE BACTERIA CULTURE: CPT | Performed by: UROLOGY

## 2021-05-15 PROCEDURE — 36415 COLL VENOUS BLD VENIPUNCTURE: CPT | Performed by: STUDENT IN AN ORGANIZED HEALTH CARE EDUCATION/TRAINING PROGRAM

## 2021-05-15 PROCEDURE — 87086 URINE CULTURE/COLONY COUNT: CPT | Performed by: UROLOGY

## 2021-05-15 PROCEDURE — 84100 ASSAY OF PHOSPHORUS: CPT | Performed by: STUDENT IN AN ORGANIZED HEALTH CARE EDUCATION/TRAINING PROGRAM

## 2021-05-15 PROCEDURE — 99233 PR SUBSEQUENT HOSPITAL CARE,LEVL III: ICD-10-PCS | Mod: ,,, | Performed by: INTERNAL MEDICINE

## 2021-05-15 PROCEDURE — 71000033 HC RECOVERY, INTIAL HOUR: Performed by: UROLOGY

## 2021-05-15 PROCEDURE — 87077 CULTURE AEROBIC IDENTIFY: CPT | Performed by: UROLOGY

## 2021-05-15 PROCEDURE — 99233 SBSQ HOSP IP/OBS HIGH 50: CPT | Mod: ,,, | Performed by: INTERNAL MEDICINE

## 2021-05-15 PROCEDURE — 52332 CYSTOSCOPY AND TREATMENT: CPT | Mod: LT,,, | Performed by: UROLOGY

## 2021-05-15 PROCEDURE — 82962 GLUCOSE BLOOD TEST: CPT | Performed by: UROLOGY

## 2021-05-15 PROCEDURE — 37000009 HC ANESTHESIA EA ADD 15 MINS: Performed by: UROLOGY

## 2021-05-15 PROCEDURE — 87186 SC STD MICRODIL/AGAR DIL: CPT | Performed by: UROLOGY

## 2021-05-15 PROCEDURE — S5010 5% DEXTROSE AND 0.45% SALINE: HCPCS | Performed by: STUDENT IN AN ORGANIZED HEALTH CARE EDUCATION/TRAINING PROGRAM

## 2021-05-15 PROCEDURE — D9220A PRA ANESTHESIA: Mod: CRNA,,, | Performed by: NURSE ANESTHETIST, CERTIFIED REGISTERED

## 2021-05-15 PROCEDURE — C2617 STENT, NON-COR, TEM W/O DEL: HCPCS | Performed by: UROLOGY

## 2021-05-15 PROCEDURE — D9220A PRA ANESTHESIA: ICD-10-PCS | Mod: ANES,,, | Performed by: STUDENT IN AN ORGANIZED HEALTH CARE EDUCATION/TRAINING PROGRAM

## 2021-05-15 PROCEDURE — 85025 COMPLETE CBC W/AUTO DIFF WBC: CPT | Performed by: STUDENT IN AN ORGANIZED HEALTH CARE EDUCATION/TRAINING PROGRAM

## 2021-05-15 PROCEDURE — D9220A PRA ANESTHESIA: ICD-10-PCS | Mod: CRNA,,, | Performed by: NURSE ANESTHETIST, CERTIFIED REGISTERED

## 2021-05-15 PROCEDURE — 94761 N-INVAS EAR/PLS OXIMETRY MLT: CPT

## 2021-05-15 PROCEDURE — 99233 SBSQ HOSP IP/OBS HIGH 50: CPT | Mod: ,,, | Performed by: STUDENT IN AN ORGANIZED HEALTH CARE EDUCATION/TRAINING PROGRAM

## 2021-05-15 DEVICE — STENT URETERAL UNIV 6FR 26CM
Type: IMPLANTABLE DEVICE | Site: URETER | Status: NON-FUNCTIONAL
Removed: 2021-07-02

## 2021-05-15 RX ORDER — LIDOCAINE HYDROCHLORIDE 20 MG/ML
INJECTION INTRAVENOUS
Status: DISCONTINUED | OUTPATIENT
Start: 2021-05-15 | End: 2021-05-15

## 2021-05-15 RX ORDER — AMOXICILLIN 250 MG
2 CAPSULE ORAL 2 TIMES DAILY PRN
Status: DISCONTINUED | OUTPATIENT
Start: 2021-05-15 | End: 2021-06-02 | Stop reason: HOSPADM

## 2021-05-15 RX ORDER — METHOCARBAMOL 500 MG/1
1000 TABLET, FILM COATED ORAL EVERY 8 HOURS PRN
Status: DISCONTINUED | OUTPATIENT
Start: 2021-05-15 | End: 2021-05-25

## 2021-05-15 RX ORDER — FENTANYL CITRATE 50 UG/ML
INJECTION, SOLUTION INTRAMUSCULAR; INTRAVENOUS
Status: DISCONTINUED | OUTPATIENT
Start: 2021-05-15 | End: 2021-05-15

## 2021-05-15 RX ORDER — MIDAZOLAM HYDROCHLORIDE 1 MG/ML
INJECTION, SOLUTION INTRAMUSCULAR; INTRAVENOUS
Status: DISCONTINUED | OUTPATIENT
Start: 2021-05-15 | End: 2021-05-15

## 2021-05-15 RX ORDER — CEFAZOLIN SODIUM 1 G/3ML
INJECTION, POWDER, FOR SOLUTION INTRAMUSCULAR; INTRAVENOUS
Status: DISCONTINUED | OUTPATIENT
Start: 2021-05-15 | End: 2021-05-15

## 2021-05-15 RX ORDER — PROPOFOL 10 MG/ML
VIAL (ML) INTRAVENOUS CONTINUOUS PRN
Status: DISCONTINUED | OUTPATIENT
Start: 2021-05-15 | End: 2021-05-15

## 2021-05-15 RX ORDER — PHENYLEPHRINE HYDROCHLORIDE 10 MG/ML
INJECTION INTRAVENOUS
Status: DISCONTINUED | OUTPATIENT
Start: 2021-05-15 | End: 2021-05-15

## 2021-05-15 RX ORDER — SODIUM CHLORIDE 0.9 % (FLUSH) 0.9 %
10 SYRINGE (ML) INJECTION
Status: DISCONTINUED | OUTPATIENT
Start: 2021-05-15 | End: 2021-05-15 | Stop reason: HOSPADM

## 2021-05-15 RX ORDER — POLYETHYLENE GLYCOL 3350 17 G/17G
17 POWDER, FOR SOLUTION ORAL DAILY
Status: DISCONTINUED | OUTPATIENT
Start: 2021-05-15 | End: 2021-06-02 | Stop reason: HOSPADM

## 2021-05-15 RX ORDER — HYDROCODONE BITARTRATE AND ACETAMINOPHEN 10; 325 MG/1; MG/1
1 TABLET ORAL EVERY 6 HOURS PRN
Status: DISCONTINUED | OUTPATIENT
Start: 2021-05-15 | End: 2021-05-31

## 2021-05-15 RX ORDER — PROPOFOL 10 MG/ML
VIAL (ML) INTRAVENOUS
Status: DISCONTINUED | OUTPATIENT
Start: 2021-05-15 | End: 2021-05-15

## 2021-05-15 RX ADMIN — PHENYLEPHRINE HYDROCHLORIDE 200 MCG: 10 INJECTION INTRAVENOUS at 09:05

## 2021-05-15 RX ADMIN — OXYBUTYNIN CHLORIDE 10 MG: 10 TABLET, EXTENDED RELEASE ORAL at 10:05

## 2021-05-15 RX ADMIN — PIPERACILLIN SODIUM AND TAZOBACTAM SODIUM 4.5 G: 4; .5 INJECTION, POWDER, FOR SOLUTION INTRAVENOUS at 02:05

## 2021-05-15 RX ADMIN — ONDANSETRON 8 MG: 8 TABLET, ORALLY DISINTEGRATING ORAL at 03:05

## 2021-05-15 RX ADMIN — PROPRANOLOL HYDROCHLORIDE 80 MG: 80 CAPSULE, EXTENDED RELEASE ORAL at 10:05

## 2021-05-15 RX ADMIN — Medication 125 MCG/KG/MIN: at 08:05

## 2021-05-15 RX ADMIN — AZELASTINE HYDROCHLORIDE 137 MCG: 137 SPRAY, METERED NASAL at 10:05

## 2021-05-15 RX ADMIN — LEVOTHYROXINE SODIUM 50 MCG: 50 TABLET ORAL at 10:05

## 2021-05-15 RX ADMIN — VENLAFAXINE HYDROCHLORIDE 150 MG: 150 CAPSULE, EXTENDED RELEASE ORAL at 10:05

## 2021-05-15 RX ADMIN — SODIUM CHLORIDE: 0.9 INJECTION, SOLUTION INTRAVENOUS at 08:05

## 2021-05-15 RX ADMIN — MIDAZOLAM HYDROCHLORIDE 2 MG: 1 INJECTION, SOLUTION INTRAMUSCULAR; INTRAVENOUS at 08:05

## 2021-05-15 RX ADMIN — CEFAZOLIN 3 G: 330 INJECTION, POWDER, FOR SOLUTION INTRAMUSCULAR; INTRAVENOUS at 08:05

## 2021-05-15 RX ADMIN — CETIRIZINE HYDROCHLORIDE 5 MG: 5 TABLET ORAL at 09:05

## 2021-05-15 RX ADMIN — PHENYLEPHRINE HYDROCHLORIDE 200 MCG: 10 INJECTION INTRAVENOUS at 08:05

## 2021-05-15 RX ADMIN — INSULIN ASPART 2 UNITS: 100 INJECTION, SOLUTION INTRAVENOUS; SUBCUTANEOUS at 10:05

## 2021-05-15 RX ADMIN — SODIUM BICARBONATE: 84 INJECTION, SOLUTION INTRAVENOUS at 05:05

## 2021-05-15 RX ADMIN — CLONIDINE HYDROCHLORIDE 0.1 MG: 0.1 TABLET ORAL at 09:05

## 2021-05-15 RX ADMIN — PHENYLEPHRINE HYDROCHLORIDE 100 MCG: 10 INJECTION INTRAVENOUS at 08:05

## 2021-05-15 RX ADMIN — CLONIDINE HYDROCHLORIDE 0.1 MG: 0.1 TABLET ORAL at 10:05

## 2021-05-15 RX ADMIN — FENTANYL CITRATE 25 MCG: 50 INJECTION, SOLUTION INTRAMUSCULAR; INTRAVENOUS at 08:05

## 2021-05-15 RX ADMIN — HYDROCODONE BITARTRATE AND ACETAMINOPHEN 1 TABLET: 10; 325 TABLET ORAL at 03:05

## 2021-05-15 RX ADMIN — ROSUVASTATIN CALCIUM 20 MG: 20 TABLET, FILM COATED ORAL at 10:05

## 2021-05-15 RX ADMIN — POLYETHYLENE GLYCOL 3350 17 G: 17 POWDER, FOR SOLUTION ORAL at 10:05

## 2021-05-15 RX ADMIN — METHOCARBAMOL 1000 MG: 500 TABLET ORAL at 11:05

## 2021-05-15 RX ADMIN — INSULIN DETEMIR 15 UNITS: 100 INJECTION, SOLUTION SUBCUTANEOUS at 09:05

## 2021-05-15 RX ADMIN — METHOCARBAMOL 1000 MG: 500 TABLET ORAL at 03:05

## 2021-05-15 RX ADMIN — INSULIN ASPART 1 UNITS: 100 INJECTION, SOLUTION INTRAVENOUS; SUBCUTANEOUS at 09:05

## 2021-05-15 RX ADMIN — HYDROCODONE BITARTRATE AND ACETAMINOPHEN 1 TABLET: 10; 325 TABLET ORAL at 10:05

## 2021-05-15 RX ADMIN — LIDOCAINE HYDROCHLORIDE 40 MG: 20 INJECTION, SOLUTION INTRAVENOUS at 08:05

## 2021-05-15 RX ADMIN — AMLODIPINE BESYLATE 10 MG: 10 TABLET ORAL at 10:05

## 2021-05-15 RX ADMIN — PIPERACILLIN SODIUM AND TAZOBACTAM SODIUM 4.5 G: 4; .5 INJECTION, POWDER, FOR SOLUTION INTRAVENOUS at 03:05

## 2021-05-15 RX ADMIN — PROPOFOL 10 MG: 10 INJECTION, EMULSION INTRAVENOUS at 08:05

## 2021-05-16 ENCOUNTER — ANESTHESIA (OUTPATIENT)
Dept: MEDSURG UNIT | Facility: HOSPITAL | Age: 71
DRG: 660 | End: 2021-05-16
Payer: MEDICARE

## 2021-05-16 ENCOUNTER — ANESTHESIA EVENT (OUTPATIENT)
Dept: MEDSURG UNIT | Facility: HOSPITAL | Age: 71
DRG: 660 | End: 2021-05-16
Payer: MEDICARE

## 2021-05-16 LAB
ALBUMIN SERPL BCP-MCNC: 1.6 G/DL (ref 3.5–5.2)
ALBUMIN SERPL BCP-MCNC: 1.6 G/DL (ref 3.5–5.2)
ALBUMIN SERPL BCP-MCNC: 1.7 G/DL (ref 3.5–5.2)
ALBUMIN SERPL BCP-MCNC: 1.7 G/DL (ref 3.5–5.2)
ALBUMIN SERPL BCP-MCNC: 1.8 G/DL (ref 3.5–5.2)
ALBUMIN SERPL BCP-MCNC: 1.8 G/DL (ref 3.5–5.2)
ANION GAP SERPL CALC-SCNC: 15 MMOL/L (ref 8–16)
ANION GAP SERPL CALC-SCNC: 15 MMOL/L (ref 8–16)
ANION GAP SERPL CALC-SCNC: 16 MMOL/L (ref 8–16)
ANION GAP SERPL CALC-SCNC: 16 MMOL/L (ref 8–16)
ANION GAP SERPL CALC-SCNC: 17 MMOL/L (ref 8–16)
ANION GAP SERPL CALC-SCNC: 17 MMOL/L (ref 8–16)
BACTERIA UR CULT: ABNORMAL
BASOPHILS # BLD AUTO: 0.07 K/UL (ref 0–0.2)
BASOPHILS NFR BLD: 0.7 % (ref 0–1.9)
BUN SERPL-MCNC: 66 MG/DL (ref 8–23)
BUN SERPL-MCNC: 67 MG/DL (ref 8–23)
BUN SERPL-MCNC: 68 MG/DL (ref 8–23)
BUN SERPL-MCNC: 68 MG/DL (ref 8–23)
BUN SERPL-MCNC: 71 MG/DL (ref 8–23)
BUN SERPL-MCNC: 72 MG/DL (ref 8–23)
CALCIUM SERPL-MCNC: 7.4 MG/DL (ref 8.7–10.5)
CALCIUM SERPL-MCNC: 7.4 MG/DL (ref 8.7–10.5)
CALCIUM SERPL-MCNC: 7.9 MG/DL (ref 8.7–10.5)
CALCIUM SERPL-MCNC: 8 MG/DL (ref 8.7–10.5)
CALCIUM SERPL-MCNC: 8.1 MG/DL (ref 8.7–10.5)
CALCIUM SERPL-MCNC: 8.5 MG/DL (ref 8.7–10.5)
CHLORIDE SERPL-SCNC: 95 MMOL/L (ref 95–110)
CHLORIDE SERPL-SCNC: 95 MMOL/L (ref 95–110)
CHLORIDE SERPL-SCNC: 96 MMOL/L (ref 95–110)
CO2 SERPL-SCNC: 12 MMOL/L (ref 23–29)
CO2 SERPL-SCNC: 12 MMOL/L (ref 23–29)
CO2 SERPL-SCNC: 13 MMOL/L (ref 23–29)
CO2 SERPL-SCNC: 13 MMOL/L (ref 23–29)
CO2 SERPL-SCNC: 15 MMOL/L (ref 23–29)
CO2 SERPL-SCNC: 15 MMOL/L (ref 23–29)
CREAT SERPL-MCNC: 5.3 MG/DL (ref 0.5–1.4)
CREAT SERPL-MCNC: 5.3 MG/DL (ref 0.5–1.4)
CREAT SERPL-MCNC: 5.5 MG/DL (ref 0.5–1.4)
CREAT SERPL-MCNC: 5.5 MG/DL (ref 0.5–1.4)
CREAT SERPL-MCNC: 5.6 MG/DL (ref 0.5–1.4)
CREAT SERPL-MCNC: 5.7 MG/DL (ref 0.5–1.4)
DIFFERENTIAL METHOD: ABNORMAL
EOSINOPHIL # BLD AUTO: 0.4 K/UL (ref 0–0.5)
EOSINOPHIL NFR BLD: 3.5 % (ref 0–8)
ERYTHROCYTE [DISTWIDTH] IN BLOOD BY AUTOMATED COUNT: 14.8 % (ref 11.5–14.5)
EST. GFR  (AFRICAN AMERICAN): 8.2 ML/MIN/1.73 M^2
EST. GFR  (AFRICAN AMERICAN): 8.3 ML/MIN/1.73 M^2
EST. GFR  (AFRICAN AMERICAN): 8.5 ML/MIN/1.73 M^2
EST. GFR  (AFRICAN AMERICAN): 8.5 ML/MIN/1.73 M^2
EST. GFR  (AFRICAN AMERICAN): 8.9 ML/MIN/1.73 M^2
EST. GFR  (AFRICAN AMERICAN): 8.9 ML/MIN/1.73 M^2
EST. GFR  (NON AFRICAN AMERICAN): 7.1 ML/MIN/1.73 M^2
EST. GFR  (NON AFRICAN AMERICAN): 7.2 ML/MIN/1.73 M^2
EST. GFR  (NON AFRICAN AMERICAN): 7.4 ML/MIN/1.73 M^2
EST. GFR  (NON AFRICAN AMERICAN): 7.4 ML/MIN/1.73 M^2
EST. GFR  (NON AFRICAN AMERICAN): 7.7 ML/MIN/1.73 M^2
EST. GFR  (NON AFRICAN AMERICAN): 7.7 ML/MIN/1.73 M^2
GLUCOSE SERPL-MCNC: 120 MG/DL (ref 70–110)
GLUCOSE SERPL-MCNC: 123 MG/DL (ref 70–110)
GLUCOSE SERPL-MCNC: 124 MG/DL (ref 70–110)
GLUCOSE SERPL-MCNC: 145 MG/DL (ref 70–110)
GLUCOSE SERPL-MCNC: 160 MG/DL (ref 70–110)
GLUCOSE SERPL-MCNC: 181 MG/DL (ref 70–110)
HCT VFR BLD AUTO: 31.4 % (ref 37–48.5)
HGB BLD-MCNC: 10.2 G/DL (ref 12–16)
IMM GRANULOCYTES # BLD AUTO: 0.1 K/UL (ref 0–0.04)
IMM GRANULOCYTES NFR BLD AUTO: 1 % (ref 0–0.5)
LYMPHOCYTES # BLD AUTO: 1.6 K/UL (ref 1–4.8)
LYMPHOCYTES NFR BLD: 15.7 % (ref 18–48)
MAGNESIUM SERPL-MCNC: 1.6 MG/DL (ref 1.6–2.6)
MCH RBC QN AUTO: 28.7 PG (ref 27–31)
MCHC RBC AUTO-ENTMCNC: 32.5 G/DL (ref 32–36)
MCV RBC AUTO: 88 FL (ref 82–98)
MONOCYTES # BLD AUTO: 1.3 K/UL (ref 0.3–1)
MONOCYTES NFR BLD: 12.8 % (ref 4–15)
NEUTROPHILS # BLD AUTO: 6.6 K/UL (ref 1.8–7.7)
NEUTROPHILS NFR BLD: 66.3 % (ref 38–73)
NRBC BLD-RTO: 0 /100 WBC
PHOSPHATE SERPL-MCNC: 3.5 MG/DL (ref 2.7–4.5)
PHOSPHATE SERPL-MCNC: 3.6 MG/DL (ref 2.7–4.5)
PHOSPHATE SERPL-MCNC: 3.9 MG/DL (ref 2.7–4.5)
PHOSPHATE SERPL-MCNC: 4 MG/DL (ref 2.7–4.5)
PHOSPHATE SERPL-MCNC: 4 MG/DL (ref 2.7–4.5)
PLATELET # BLD AUTO: 112 K/UL (ref 150–450)
PMV BLD AUTO: 11.2 FL (ref 9.2–12.9)
POCT GLUCOSE: 262 MG/DL (ref 70–110)
POTASSIUM SERPL-SCNC: 4.7 MMOL/L (ref 3.5–5.1)
POTASSIUM SERPL-SCNC: 4.8 MMOL/L (ref 3.5–5.1)
POTASSIUM SERPL-SCNC: 5 MMOL/L (ref 3.5–5.1)
POTASSIUM SERPL-SCNC: 5.1 MMOL/L (ref 3.5–5.1)
RBC # BLD AUTO: 3.56 M/UL (ref 4–5.4)
SODIUM SERPL-SCNC: 124 MMOL/L (ref 136–145)
SODIUM SERPL-SCNC: 124 MMOL/L (ref 136–145)
SODIUM SERPL-SCNC: 125 MMOL/L (ref 136–145)
SODIUM SERPL-SCNC: 127 MMOL/L (ref 136–145)
WBC # BLD AUTO: 9.9 K/UL (ref 3.9–12.7)

## 2021-05-16 PROCEDURE — 99233 PR SUBSEQUENT HOSPITAL CARE,LEVL III: ICD-10-PCS | Mod: ,,, | Performed by: STUDENT IN AN ORGANIZED HEALTH CARE EDUCATION/TRAINING PROGRAM

## 2021-05-16 PROCEDURE — 76937 US GUIDE VASCULAR ACCESS: CPT | Performed by: STUDENT IN AN ORGANIZED HEALTH CARE EDUCATION/TRAINING PROGRAM

## 2021-05-16 PROCEDURE — 36415 COLL VENOUS BLD VENIPUNCTURE: CPT | Performed by: STUDENT IN AN ORGANIZED HEALTH CARE EDUCATION/TRAINING PROGRAM

## 2021-05-16 PROCEDURE — 20600001 HC STEP DOWN PRIVATE ROOM

## 2021-05-16 PROCEDURE — 80100014 HC HEMODIALYSIS 1:1

## 2021-05-16 PROCEDURE — 83735 ASSAY OF MAGNESIUM: CPT | Performed by: STUDENT IN AN ORGANIZED HEALTH CARE EDUCATION/TRAINING PROGRAM

## 2021-05-16 PROCEDURE — 80069 RENAL FUNCTION PANEL: CPT | Performed by: STUDENT IN AN ORGANIZED HEALTH CARE EDUCATION/TRAINING PROGRAM

## 2021-05-16 PROCEDURE — 97530 THERAPEUTIC ACTIVITIES: CPT

## 2021-05-16 PROCEDURE — 25000003 PHARM REV CODE 250: Performed by: STUDENT IN AN ORGANIZED HEALTH CARE EDUCATION/TRAINING PROGRAM

## 2021-05-16 PROCEDURE — 99233 SBSQ HOSP IP/OBS HIGH 50: CPT | Mod: ,,, | Performed by: STUDENT IN AN ORGANIZED HEALTH CARE EDUCATION/TRAINING PROGRAM

## 2021-05-16 PROCEDURE — 80069 RENAL FUNCTION PANEL: CPT | Mod: 91 | Performed by: STUDENT IN AN ORGANIZED HEALTH CARE EDUCATION/TRAINING PROGRAM

## 2021-05-16 PROCEDURE — 97166 OT EVAL MOD COMPLEX 45 MIN: CPT

## 2021-05-16 PROCEDURE — 99233 PR SUBSEQUENT HOSPITAL CARE,LEVL III: ICD-10-PCS | Mod: ,,, | Performed by: INTERNAL MEDICINE

## 2021-05-16 PROCEDURE — S5010 5% DEXTROSE AND 0.45% SALINE: HCPCS | Performed by: STUDENT IN AN ORGANIZED HEALTH CARE EDUCATION/TRAINING PROGRAM

## 2021-05-16 PROCEDURE — 25000003 PHARM REV CODE 250: Performed by: HOSPITALIST

## 2021-05-16 PROCEDURE — 85025 COMPLETE CBC W/AUTO DIFF WBC: CPT | Performed by: STUDENT IN AN ORGANIZED HEALTH CARE EDUCATION/TRAINING PROGRAM

## 2021-05-16 PROCEDURE — 36556 CENTRAL LINE: ICD-10-PCS | Mod: GC,,, | Performed by: ANESTHESIOLOGY

## 2021-05-16 PROCEDURE — 27000221 HC OXYGEN, UP TO 24 HOURS

## 2021-05-16 PROCEDURE — 76937 US GUIDE VASCULAR ACCESS: CPT | Mod: 26,GC,, | Performed by: ANESTHESIOLOGY

## 2021-05-16 PROCEDURE — 36556 INSERT NON-TUNNEL CV CATH: CPT | Mod: GC,,, | Performed by: ANESTHESIOLOGY

## 2021-05-16 PROCEDURE — 97535 SELF CARE MNGMENT TRAINING: CPT

## 2021-05-16 PROCEDURE — 76937 CENTRAL LINE: ICD-10-PCS | Mod: 26,GC,, | Performed by: ANESTHESIOLOGY

## 2021-05-16 PROCEDURE — 63600175 PHARM REV CODE 636 W HCPCS: Performed by: STUDENT IN AN ORGANIZED HEALTH CARE EDUCATION/TRAINING PROGRAM

## 2021-05-16 PROCEDURE — 99233 SBSQ HOSP IP/OBS HIGH 50: CPT | Mod: ,,, | Performed by: INTERNAL MEDICINE

## 2021-05-16 PROCEDURE — 97161 PT EVAL LOW COMPLEX 20 MIN: CPT

## 2021-05-16 RX ORDER — SODIUM CHLORIDE 9 MG/ML
INJECTION, SOLUTION INTRAVENOUS
Status: DISCONTINUED | OUTPATIENT
Start: 2021-05-16 | End: 2021-06-02 | Stop reason: HOSPADM

## 2021-05-16 RX ORDER — SODIUM CHLORIDE 9 MG/ML
INJECTION, SOLUTION INTRAVENOUS ONCE
Status: DISCONTINUED | OUTPATIENT
Start: 2021-05-16 | End: 2021-05-20

## 2021-05-16 RX ADMIN — PROPRANOLOL HYDROCHLORIDE 80 MG: 80 CAPSULE, EXTENDED RELEASE ORAL at 09:05

## 2021-05-16 RX ADMIN — AZELASTINE HYDROCHLORIDE 137 MCG: 137 SPRAY, METERED NASAL at 10:05

## 2021-05-16 RX ADMIN — CLONIDINE HYDROCHLORIDE 0.1 MG: 0.1 TABLET ORAL at 04:05

## 2021-05-16 RX ADMIN — METHOCARBAMOL 1000 MG: 500 TABLET ORAL at 09:05

## 2021-05-16 RX ADMIN — ROSUVASTATIN CALCIUM 20 MG: 20 TABLET, FILM COATED ORAL at 09:05

## 2021-05-16 RX ADMIN — HYDROCODONE BITARTRATE AND ACETAMINOPHEN 1 TABLET: 10; 325 TABLET ORAL at 04:05

## 2021-05-16 RX ADMIN — CLONIDINE HYDROCHLORIDE 0.1 MG: 0.1 TABLET ORAL at 09:05

## 2021-05-16 RX ADMIN — HYDROCODONE BITARTRATE AND ACETAMINOPHEN 1 TABLET: 10; 325 TABLET ORAL at 09:05

## 2021-05-16 RX ADMIN — AMLODIPINE BESYLATE 10 MG: 10 TABLET ORAL at 08:05

## 2021-05-16 RX ADMIN — LEVOTHYROXINE SODIUM 50 MCG: 50 TABLET ORAL at 08:05

## 2021-05-16 RX ADMIN — POLYETHYLENE GLYCOL 3350 17 G: 17 POWDER, FOR SOLUTION ORAL at 08:05

## 2021-05-16 RX ADMIN — INSULIN DETEMIR 15 UNITS: 100 INJECTION, SOLUTION SUBCUTANEOUS at 10:05

## 2021-05-16 RX ADMIN — PIPERACILLIN SODIUM AND TAZOBACTAM SODIUM 4.5 G: 4; .5 INJECTION, POWDER, FOR SOLUTION INTRAVENOUS at 04:05

## 2021-05-16 RX ADMIN — INSULIN ASPART 1 UNITS: 100 INJECTION, SOLUTION INTRAVENOUS; SUBCUTANEOUS at 10:05

## 2021-05-16 RX ADMIN — PIPERACILLIN SODIUM AND TAZOBACTAM SODIUM 4.5 G: 4; .5 INJECTION, POWDER, FOR SOLUTION INTRAVENOUS at 05:05

## 2021-05-16 RX ADMIN — CETIRIZINE HYDROCHLORIDE 5 MG: 5 TABLET ORAL at 09:05

## 2021-05-16 RX ADMIN — AZELASTINE HYDROCHLORIDE 137 MCG: 137 SPRAY, METERED NASAL at 09:05

## 2021-05-16 RX ADMIN — HYDROCODONE BITARTRATE AND ACETAMINOPHEN 1 TABLET: 10; 325 TABLET ORAL at 08:05

## 2021-05-16 RX ADMIN — VENLAFAXINE HYDROCHLORIDE 150 MG: 150 CAPSULE, EXTENDED RELEASE ORAL at 08:05

## 2021-05-16 RX ADMIN — CLONIDINE HYDROCHLORIDE 0.1 MG: 0.1 TABLET ORAL at 08:05

## 2021-05-16 RX ADMIN — OXYBUTYNIN CHLORIDE 10 MG: 10 TABLET, EXTENDED RELEASE ORAL at 08:05

## 2021-05-16 RX ADMIN — SODIUM BICARBONATE: 84 INJECTION, SOLUTION INTRAVENOUS at 04:05

## 2021-05-17 LAB
ALBUMIN SERPL BCP-MCNC: 1.6 G/DL (ref 3.5–5.2)
ALBUMIN SERPL BCP-MCNC: 1.6 G/DL (ref 3.5–5.2)
ALBUMIN SERPL BCP-MCNC: 1.7 G/DL (ref 3.5–5.2)
ALBUMIN SERPL BCP-MCNC: 1.7 G/DL (ref 3.5–5.2)
ANION GAP SERPL CALC-SCNC: 17 MMOL/L (ref 8–16)
ANION GAP SERPL CALC-SCNC: 19 MMOL/L (ref 8–16)
BACTERIA UR CULT: ABNORMAL
BASOPHILS # BLD AUTO: 0.04 K/UL (ref 0–0.2)
BASOPHILS NFR BLD: 0.4 % (ref 0–1.9)
BUN SERPL-MCNC: 54 MG/DL (ref 8–23)
BUN SERPL-MCNC: 55 MG/DL (ref 8–23)
BUN SERPL-MCNC: 58 MG/DL (ref 8–23)
BUN SERPL-MCNC: 59 MG/DL (ref 8–23)
CALCIUM SERPL-MCNC: 7.2 MG/DL (ref 8.7–10.5)
CALCIUM SERPL-MCNC: 7.3 MG/DL (ref 8.7–10.5)
CALCIUM SERPL-MCNC: 7.7 MG/DL (ref 8.7–10.5)
CALCIUM SERPL-MCNC: 8 MG/DL (ref 8.7–10.5)
CHLORIDE SERPL-SCNC: 92 MMOL/L (ref 95–110)
CHLORIDE SERPL-SCNC: 92 MMOL/L (ref 95–110)
CHLORIDE SERPL-SCNC: 93 MMOL/L (ref 95–110)
CHLORIDE SERPL-SCNC: 96 MMOL/L (ref 95–110)
CO2 SERPL-SCNC: 13 MMOL/L (ref 23–29)
CO2 SERPL-SCNC: 18 MMOL/L (ref 23–29)
CO2 SERPL-SCNC: 19 MMOL/L (ref 23–29)
CO2 SERPL-SCNC: 20 MMOL/L (ref 23–29)
CREAT SERPL-MCNC: 4.7 MG/DL (ref 0.5–1.4)
CREAT SERPL-MCNC: 4.7 MG/DL (ref 0.5–1.4)
CREAT SERPL-MCNC: 5 MG/DL (ref 0.5–1.4)
CREAT SERPL-MCNC: 5.2 MG/DL (ref 0.5–1.4)
CREAT UR-MCNC: 21 MG/DL (ref 15–325)
CREAT UR-MCNC: 21 MG/DL (ref 15–325)
DIFFERENTIAL METHOD: ABNORMAL
EOSINOPHIL # BLD AUTO: 0.4 K/UL (ref 0–0.5)
EOSINOPHIL NFR BLD: 3.7 % (ref 0–8)
ERYTHROCYTE [DISTWIDTH] IN BLOOD BY AUTOMATED COUNT: 14.4 % (ref 11.5–14.5)
EST. GFR  (AFRICAN AMERICAN): 10.3 ML/MIN/1.73 M^2
EST. GFR  (AFRICAN AMERICAN): 10.3 ML/MIN/1.73 M^2
EST. GFR  (AFRICAN AMERICAN): 9.1 ML/MIN/1.73 M^2
EST. GFR  (AFRICAN AMERICAN): 9.6 ML/MIN/1.73 M^2
EST. GFR  (NON AFRICAN AMERICAN): 7.9 ML/MIN/1.73 M^2
EST. GFR  (NON AFRICAN AMERICAN): 8.3 ML/MIN/1.73 M^2
EST. GFR  (NON AFRICAN AMERICAN): 8.9 ML/MIN/1.73 M^2
EST. GFR  (NON AFRICAN AMERICAN): 8.9 ML/MIN/1.73 M^2
GLUCOSE SERPL-MCNC: 114 MG/DL (ref 70–110)
GLUCOSE SERPL-MCNC: 138 MG/DL (ref 70–110)
GLUCOSE SERPL-MCNC: 149 MG/DL (ref 70–110)
GLUCOSE SERPL-MCNC: 161 MG/DL (ref 70–110)
HCT VFR BLD AUTO: 29.8 % (ref 37–48.5)
HGB BLD-MCNC: 10.2 G/DL (ref 12–16)
IMM GRANULOCYTES # BLD AUTO: 0.23 K/UL (ref 0–0.04)
IMM GRANULOCYTES NFR BLD AUTO: 2.3 % (ref 0–0.5)
LYMPHOCYTES # BLD AUTO: 1.2 K/UL (ref 1–4.8)
LYMPHOCYTES NFR BLD: 11.8 % (ref 18–48)
MAGNESIUM SERPL-MCNC: 1.7 MG/DL (ref 1.6–2.6)
MCH RBC QN AUTO: 28.4 PG (ref 27–31)
MCHC RBC AUTO-ENTMCNC: 34.2 G/DL (ref 32–36)
MCV RBC AUTO: 83 FL (ref 82–98)
MONOCYTES # BLD AUTO: 1.9 K/UL (ref 0.3–1)
MONOCYTES NFR BLD: 18.5 % (ref 4–15)
NEUTROPHILS # BLD AUTO: 6.4 K/UL (ref 1.8–7.7)
NEUTROPHILS NFR BLD: 63.3 % (ref 38–73)
NRBC BLD-RTO: 0 /100 WBC
OSMOLALITY UR: 295 MOSM/KG (ref 50–1200)
PHOSPHATE SERPL-MCNC: 4 MG/DL (ref 2.7–4.5)
PHOSPHATE SERPL-MCNC: 4 MG/DL (ref 2.7–4.5)
PHOSPHATE SERPL-MCNC: 4.2 MG/DL (ref 2.7–4.5)
PHOSPHATE SERPL-MCNC: 4.3 MG/DL (ref 2.7–4.5)
PHOSPHATE SERPL-MCNC: 4.9 MG/DL (ref 2.7–4.5)
PLATELET # BLD AUTO: 84 K/UL (ref 150–450)
PLATELET BLD QL SMEAR: ABNORMAL
PMV BLD AUTO: 10.7 FL (ref 9.2–12.9)
POCT GLUCOSE: 112 MG/DL (ref 70–110)
POCT GLUCOSE: 135 MG/DL (ref 70–110)
POTASSIUM SERPL-SCNC: 4.2 MMOL/L (ref 3.5–5.1)
POTASSIUM SERPL-SCNC: 4.2 MMOL/L (ref 3.5–5.1)
POTASSIUM SERPL-SCNC: 4.9 MMOL/L (ref 3.5–5.1)
POTASSIUM SERPL-SCNC: 5.2 MMOL/L (ref 3.5–5.1)
PROT UR-MCNC: 452 MG/DL (ref 0–15)
PROT/CREAT UR: 21.52 MG/G{CREAT} (ref 0–0.2)
RBC # BLD AUTO: 3.59 M/UL (ref 4–5.4)
SODIUM SERPL-SCNC: 127 MMOL/L (ref 136–145)
SODIUM SERPL-SCNC: 128 MMOL/L (ref 136–145)
SODIUM SERPL-SCNC: 129 MMOL/L (ref 136–145)
SODIUM SERPL-SCNC: 129 MMOL/L (ref 136–145)
SODIUM UR-SCNC: 88 MMOL/L (ref 20–250)
UUN UR-MCNC: 99 MG/DL (ref 140–1050)
WBC # BLD AUTO: 10.16 K/UL (ref 3.9–12.7)

## 2021-05-17 PROCEDURE — 85025 COMPLETE CBC W/AUTO DIFF WBC: CPT | Performed by: STUDENT IN AN ORGANIZED HEALTH CARE EDUCATION/TRAINING PROGRAM

## 2021-05-17 PROCEDURE — 97530 THERAPEUTIC ACTIVITIES: CPT

## 2021-05-17 PROCEDURE — 25000003 PHARM REV CODE 250: Performed by: HOSPITALIST

## 2021-05-17 PROCEDURE — 99233 SBSQ HOSP IP/OBS HIGH 50: CPT | Mod: ,,, | Performed by: STUDENT IN AN ORGANIZED HEALTH CARE EDUCATION/TRAINING PROGRAM

## 2021-05-17 PROCEDURE — 99900035 HC TECH TIME PER 15 MIN (STAT)

## 2021-05-17 PROCEDURE — 82570 ASSAY OF URINE CREATININE: CPT | Performed by: STUDENT IN AN ORGANIZED HEALTH CARE EDUCATION/TRAINING PROGRAM

## 2021-05-17 PROCEDURE — 83935 ASSAY OF URINE OSMOLALITY: CPT | Performed by: INTERNAL MEDICINE

## 2021-05-17 PROCEDURE — 99232 SBSQ HOSP IP/OBS MODERATE 35: CPT | Mod: GC,,, | Performed by: INTERNAL MEDICINE

## 2021-05-17 PROCEDURE — 25000003 PHARM REV CODE 250: Performed by: STUDENT IN AN ORGANIZED HEALTH CARE EDUCATION/TRAINING PROGRAM

## 2021-05-17 PROCEDURE — C9399 UNCLASSIFIED DRUGS OR BIOLOG: HCPCS | Performed by: STUDENT IN AN ORGANIZED HEALTH CARE EDUCATION/TRAINING PROGRAM

## 2021-05-17 PROCEDURE — 63600175 PHARM REV CODE 636 W HCPCS: Performed by: STUDENT IN AN ORGANIZED HEALTH CARE EDUCATION/TRAINING PROGRAM

## 2021-05-17 PROCEDURE — 97535 SELF CARE MNGMENT TRAINING: CPT

## 2021-05-17 PROCEDURE — 94761 N-INVAS EAR/PLS OXIMETRY MLT: CPT

## 2021-05-17 PROCEDURE — 99233 PR SUBSEQUENT HOSPITAL CARE,LEVL III: ICD-10-PCS | Mod: ,,, | Performed by: STUDENT IN AN ORGANIZED HEALTH CARE EDUCATION/TRAINING PROGRAM

## 2021-05-17 PROCEDURE — 36415 COLL VENOUS BLD VENIPUNCTURE: CPT | Performed by: STUDENT IN AN ORGANIZED HEALTH CARE EDUCATION/TRAINING PROGRAM

## 2021-05-17 PROCEDURE — S5010 5% DEXTROSE AND 0.45% SALINE: HCPCS | Performed by: STUDENT IN AN ORGANIZED HEALTH CARE EDUCATION/TRAINING PROGRAM

## 2021-05-17 PROCEDURE — 83735 ASSAY OF MAGNESIUM: CPT | Performed by: STUDENT IN AN ORGANIZED HEALTH CARE EDUCATION/TRAINING PROGRAM

## 2021-05-17 PROCEDURE — 90935 HEMODIALYSIS ONE EVALUATION: CPT

## 2021-05-17 PROCEDURE — 27000221 HC OXYGEN, UP TO 24 HOURS

## 2021-05-17 PROCEDURE — 20600001 HC STEP DOWN PRIVATE ROOM

## 2021-05-17 PROCEDURE — 99232 PR SUBSEQUENT HOSPITAL CARE,LEVL II: ICD-10-PCS | Mod: GC,,, | Performed by: INTERNAL MEDICINE

## 2021-05-17 PROCEDURE — 84540 ASSAY OF URINE/UREA-N: CPT | Performed by: STUDENT IN AN ORGANIZED HEALTH CARE EDUCATION/TRAINING PROGRAM

## 2021-05-17 PROCEDURE — 84300 ASSAY OF URINE SODIUM: CPT | Performed by: INTERNAL MEDICINE

## 2021-05-17 PROCEDURE — 25000003 PHARM REV CODE 250: Performed by: GENERAL PRACTICE

## 2021-05-17 PROCEDURE — 80069 RENAL FUNCTION PANEL: CPT | Performed by: STUDENT IN AN ORGANIZED HEALTH CARE EDUCATION/TRAINING PROGRAM

## 2021-05-17 RX ORDER — CEFTRIAXONE 1 G/1
1 INJECTION, POWDER, FOR SOLUTION INTRAMUSCULAR; INTRAVENOUS
Status: DISCONTINUED | OUTPATIENT
Start: 2021-05-17 | End: 2021-05-22

## 2021-05-17 RX ORDER — LEVOTHYROXINE SODIUM 50 UG/1
50 TABLET ORAL
Status: DISCONTINUED | OUTPATIENT
Start: 2021-05-18 | End: 2021-06-02 | Stop reason: HOSPADM

## 2021-05-17 RX ORDER — SODIUM CHLORIDE 9 MG/ML
INJECTION, SOLUTION INTRAVENOUS ONCE
Status: COMPLETED | OUTPATIENT
Start: 2021-05-17 | End: 2021-05-17

## 2021-05-17 RX ADMIN — VENLAFAXINE HYDROCHLORIDE 150 MG: 150 CAPSULE, EXTENDED RELEASE ORAL at 09:05

## 2021-05-17 RX ADMIN — HYDROCODONE BITARTRATE AND ACETAMINOPHEN 1 TABLET: 10; 325 TABLET ORAL at 10:05

## 2021-05-17 RX ADMIN — INSULIN DETEMIR 15 UNITS: 100 INJECTION, SOLUTION SUBCUTANEOUS at 08:05

## 2021-05-17 RX ADMIN — AZELASTINE HYDROCHLORIDE 137 MCG: 137 SPRAY, METERED NASAL at 08:05

## 2021-05-17 RX ADMIN — CLONIDINE HYDROCHLORIDE 0.1 MG: 0.1 TABLET ORAL at 02:05

## 2021-05-17 RX ADMIN — PIPERACILLIN SODIUM AND TAZOBACTAM SODIUM 4.5 G: 4; .5 INJECTION, POWDER, FOR SOLUTION INTRAVENOUS at 03:05

## 2021-05-17 RX ADMIN — CEFTRIAXONE 1 G: 1 INJECTION, POWDER, FOR SOLUTION INTRAMUSCULAR; INTRAVENOUS at 10:05

## 2021-05-17 RX ADMIN — SODIUM BICARBONATE: 84 INJECTION, SOLUTION INTRAVENOUS at 03:05

## 2021-05-17 RX ADMIN — SODIUM BICARBONATE: 84 INJECTION, SOLUTION INTRAVENOUS at 08:05

## 2021-05-17 RX ADMIN — ROSUVASTATIN CALCIUM 20 MG: 20 TABLET, FILM COATED ORAL at 09:05

## 2021-05-17 RX ADMIN — AZELASTINE HYDROCHLORIDE 137 MCG: 137 SPRAY, METERED NASAL at 10:05

## 2021-05-17 RX ADMIN — HYDROCODONE BITARTRATE AND ACETAMINOPHEN 1 TABLET: 10; 325 TABLET ORAL at 08:05

## 2021-05-17 RX ADMIN — AMLODIPINE BESYLATE 10 MG: 10 TABLET ORAL at 09:05

## 2021-05-17 RX ADMIN — CLONIDINE HYDROCHLORIDE 0.1 MG: 0.1 TABLET ORAL at 08:05

## 2021-05-17 RX ADMIN — LEVOTHYROXINE SODIUM 50 MCG: 50 TABLET ORAL at 09:05

## 2021-05-17 RX ADMIN — PROPRANOLOL HYDROCHLORIDE 80 MG: 80 CAPSULE, EXTENDED RELEASE ORAL at 09:05

## 2021-05-17 RX ADMIN — CETIRIZINE HYDROCHLORIDE 5 MG: 5 TABLET ORAL at 08:05

## 2021-05-17 RX ADMIN — SODIUM CHLORIDE: 0.9 INJECTION, SOLUTION INTRAVENOUS at 02:05

## 2021-05-17 RX ADMIN — OXYBUTYNIN CHLORIDE 10 MG: 10 TABLET, EXTENDED RELEASE ORAL at 09:05

## 2021-05-17 RX ADMIN — CLONIDINE HYDROCHLORIDE 0.1 MG: 0.1 TABLET ORAL at 09:05

## 2021-05-17 RX ADMIN — POLYETHYLENE GLYCOL 3350 17 G: 17 POWDER, FOR SOLUTION ORAL at 02:05

## 2021-05-18 ENCOUNTER — PATIENT OUTREACH (OUTPATIENT)
Dept: ADMINISTRATIVE | Facility: OTHER | Age: 71
End: 2021-05-18

## 2021-05-18 ENCOUNTER — TELEPHONE (OUTPATIENT)
Dept: NEPHROLOGY | Facility: CLINIC | Age: 71
End: 2021-05-18

## 2021-05-18 LAB
ALBUMIN SERPL BCP-MCNC: 1.6 G/DL (ref 3.5–5.2)
ALBUMIN SERPL BCP-MCNC: 1.6 G/DL (ref 3.5–5.2)
ALBUMIN SERPL BCP-MCNC: 1.7 G/DL (ref 3.5–5.2)
ALBUMIN SERPL BCP-MCNC: 1.8 G/DL (ref 3.5–5.2)
ANION GAP SERPL CALC-SCNC: 12 MMOL/L (ref 8–16)
ANION GAP SERPL CALC-SCNC: 15 MMOL/L (ref 8–16)
ANION GAP SERPL CALC-SCNC: 15 MMOL/L (ref 8–16)
ANION GAP SERPL CALC-SCNC: 17 MMOL/L (ref 8–16)
BASOPHILS # BLD AUTO: 0.06 K/UL (ref 0–0.2)
BASOPHILS NFR BLD: 0.6 % (ref 0–1.9)
BUN SERPL-MCNC: 39 MG/DL (ref 8–23)
BUN SERPL-MCNC: 40 MG/DL (ref 8–23)
BUN SERPL-MCNC: 42 MG/DL (ref 8–23)
BUN SERPL-MCNC: 42 MG/DL (ref 8–23)
CALCIUM SERPL-MCNC: 7.1 MG/DL (ref 8.7–10.5)
CALCIUM SERPL-MCNC: 7.1 MG/DL (ref 8.7–10.5)
CALCIUM SERPL-MCNC: 7.5 MG/DL (ref 8.7–10.5)
CALCIUM SERPL-MCNC: 7.8 MG/DL (ref 8.7–10.5)
CHLORIDE SERPL-SCNC: 89 MMOL/L (ref 95–110)
CHLORIDE SERPL-SCNC: 90 MMOL/L (ref 95–110)
CHLORIDE SERPL-SCNC: 91 MMOL/L (ref 95–110)
CHLORIDE SERPL-SCNC: 93 MMOL/L (ref 95–110)
CO2 SERPL-SCNC: 19 MMOL/L (ref 23–29)
CO2 SERPL-SCNC: 23 MMOL/L (ref 23–29)
CO2 SERPL-SCNC: 24 MMOL/L (ref 23–29)
CO2 SERPL-SCNC: 26 MMOL/L (ref 23–29)
CREAT SERPL-MCNC: 3.9 MG/DL (ref 0.5–1.4)
CREAT SERPL-MCNC: 4.1 MG/DL (ref 0.5–1.4)
CREAT SERPL-MCNC: 4.2 MG/DL (ref 0.5–1.4)
CREAT SERPL-MCNC: 4.2 MG/DL (ref 0.5–1.4)
DIFFERENTIAL METHOD: ABNORMAL
EOSINOPHIL # BLD AUTO: 0.3 K/UL (ref 0–0.5)
EOSINOPHIL NFR BLD: 3.4 % (ref 0–8)
ERYTHROCYTE [DISTWIDTH] IN BLOOD BY AUTOMATED COUNT: 14.3 % (ref 11.5–14.5)
EST. GFR  (AFRICAN AMERICAN): 11.8 ML/MIN/1.73 M^2
EST. GFR  (AFRICAN AMERICAN): 11.8 ML/MIN/1.73 M^2
EST. GFR  (AFRICAN AMERICAN): 12.1 ML/MIN/1.73 M^2
EST. GFR  (AFRICAN AMERICAN): 12.9 ML/MIN/1.73 M^2
EST. GFR  (NON AFRICAN AMERICAN): 10.2 ML/MIN/1.73 M^2
EST. GFR  (NON AFRICAN AMERICAN): 10.2 ML/MIN/1.73 M^2
EST. GFR  (NON AFRICAN AMERICAN): 10.5 ML/MIN/1.73 M^2
EST. GFR  (NON AFRICAN AMERICAN): 11.2 ML/MIN/1.73 M^2
GLUCOSE SERPL-MCNC: 151 MG/DL (ref 70–110)
GLUCOSE SERPL-MCNC: 160 MG/DL (ref 70–110)
GLUCOSE SERPL-MCNC: 161 MG/DL (ref 70–110)
GLUCOSE SERPL-MCNC: 200 MG/DL (ref 70–110)
HCT VFR BLD AUTO: 28.8 % (ref 37–48.5)
HGB BLD-MCNC: 10.4 G/DL (ref 12–16)
IMM GRANULOCYTES # BLD AUTO: 0.37 K/UL (ref 0–0.04)
IMM GRANULOCYTES NFR BLD AUTO: 3.8 % (ref 0–0.5)
LYMPHOCYTES # BLD AUTO: 1.5 K/UL (ref 1–4.8)
LYMPHOCYTES NFR BLD: 15.3 % (ref 18–48)
MAGNESIUM SERPL-MCNC: 1.6 MG/DL (ref 1.6–2.6)
MCH RBC QN AUTO: 29.2 PG (ref 27–31)
MCHC RBC AUTO-ENTMCNC: 36.1 G/DL (ref 32–36)
MCV RBC AUTO: 81 FL (ref 82–98)
MONOCYTES # BLD AUTO: 2.2 K/UL (ref 0.3–1)
MONOCYTES NFR BLD: 22.6 % (ref 4–15)
NEUTROPHILS # BLD AUTO: 5.3 K/UL (ref 1.8–7.7)
NEUTROPHILS NFR BLD: 54.3 % (ref 38–73)
NRBC BLD-RTO: 0 /100 WBC
PHOSPHATE SERPL-MCNC: 3.9 MG/DL (ref 2.7–4.5)
PHOSPHATE SERPL-MCNC: 4.2 MG/DL (ref 2.7–4.5)
PHOSPHATE SERPL-MCNC: 4.3 MG/DL (ref 2.7–4.5)
PLATELET # BLD AUTO: 78 K/UL (ref 150–450)
PMV BLD AUTO: 12.2 FL (ref 9.2–12.9)
POCT GLUCOSE: 225 MG/DL (ref 70–110)
POTASSIUM SERPL-SCNC: 3.9 MMOL/L (ref 3.5–5.1)
POTASSIUM SERPL-SCNC: 3.9 MMOL/L (ref 3.5–5.1)
POTASSIUM SERPL-SCNC: 4.1 MMOL/L (ref 3.5–5.1)
POTASSIUM SERPL-SCNC: 4.7 MMOL/L (ref 3.5–5.1)
RBC # BLD AUTO: 3.56 M/UL (ref 4–5.4)
SODIUM SERPL-SCNC: 126 MMOL/L (ref 136–145)
SODIUM SERPL-SCNC: 128 MMOL/L (ref 136–145)
SODIUM SERPL-SCNC: 129 MMOL/L (ref 136–145)
SODIUM SERPL-SCNC: 131 MMOL/L (ref 136–145)
WBC # BLD AUTO: 9.7 K/UL (ref 3.9–12.7)

## 2021-05-18 PROCEDURE — 36415 COLL VENOUS BLD VENIPUNCTURE: CPT | Performed by: STUDENT IN AN ORGANIZED HEALTH CARE EDUCATION/TRAINING PROGRAM

## 2021-05-18 PROCEDURE — 99232 SBSQ HOSP IP/OBS MODERATE 35: CPT | Mod: GC,,, | Performed by: INTERNAL MEDICINE

## 2021-05-18 PROCEDURE — 99233 SBSQ HOSP IP/OBS HIGH 50: CPT | Mod: ,,, | Performed by: STUDENT IN AN ORGANIZED HEALTH CARE EDUCATION/TRAINING PROGRAM

## 2021-05-18 PROCEDURE — 99233 PR SUBSEQUENT HOSPITAL CARE,LEVL III: ICD-10-PCS | Mod: ,,, | Performed by: STUDENT IN AN ORGANIZED HEALTH CARE EDUCATION/TRAINING PROGRAM

## 2021-05-18 PROCEDURE — 80069 RENAL FUNCTION PANEL: CPT | Mod: 91 | Performed by: STUDENT IN AN ORGANIZED HEALTH CARE EDUCATION/TRAINING PROGRAM

## 2021-05-18 PROCEDURE — 99232 PR SUBSEQUENT HOSPITAL CARE,LEVL II: ICD-10-PCS | Mod: GC,,, | Performed by: INTERNAL MEDICINE

## 2021-05-18 PROCEDURE — 25000003 PHARM REV CODE 250: Performed by: HOSPITALIST

## 2021-05-18 PROCEDURE — 83735 ASSAY OF MAGNESIUM: CPT | Performed by: STUDENT IN AN ORGANIZED HEALTH CARE EDUCATION/TRAINING PROGRAM

## 2021-05-18 PROCEDURE — 80069 RENAL FUNCTION PANEL: CPT | Performed by: STUDENT IN AN ORGANIZED HEALTH CARE EDUCATION/TRAINING PROGRAM

## 2021-05-18 PROCEDURE — 94761 N-INVAS EAR/PLS OXIMETRY MLT: CPT

## 2021-05-18 PROCEDURE — 27000221 HC OXYGEN, UP TO 24 HOURS

## 2021-05-18 PROCEDURE — 25000003 PHARM REV CODE 250: Performed by: STUDENT IN AN ORGANIZED HEALTH CARE EDUCATION/TRAINING PROGRAM

## 2021-05-18 PROCEDURE — S5010 5% DEXTROSE AND 0.45% SALINE: HCPCS | Performed by: STUDENT IN AN ORGANIZED HEALTH CARE EDUCATION/TRAINING PROGRAM

## 2021-05-18 PROCEDURE — 85025 COMPLETE CBC W/AUTO DIFF WBC: CPT | Performed by: STUDENT IN AN ORGANIZED HEALTH CARE EDUCATION/TRAINING PROGRAM

## 2021-05-18 PROCEDURE — 63600175 PHARM REV CODE 636 W HCPCS: Performed by: STUDENT IN AN ORGANIZED HEALTH CARE EDUCATION/TRAINING PROGRAM

## 2021-05-18 PROCEDURE — 99900035 HC TECH TIME PER 15 MIN (STAT)

## 2021-05-18 PROCEDURE — 20600001 HC STEP DOWN PRIVATE ROOM

## 2021-05-18 RX ORDER — MUPIROCIN 20 MG/G
OINTMENT TOPICAL 2 TIMES DAILY
Status: DISPENSED | OUTPATIENT
Start: 2021-05-18 | End: 2021-05-23

## 2021-05-18 RX ORDER — SODIUM BICARBONATE 650 MG/1
1300 TABLET ORAL 3 TIMES DAILY
Status: DISCONTINUED | OUTPATIENT
Start: 2021-05-18 | End: 2021-06-02 | Stop reason: HOSPADM

## 2021-05-18 RX ADMIN — ROSUVASTATIN CALCIUM 20 MG: 20 TABLET, FILM COATED ORAL at 09:05

## 2021-05-18 RX ADMIN — CLONIDINE HYDROCHLORIDE 0.1 MG: 0.1 TABLET ORAL at 09:05

## 2021-05-18 RX ADMIN — HYDROCODONE BITARTRATE AND ACETAMINOPHEN 1 TABLET: 10; 325 TABLET ORAL at 12:05

## 2021-05-18 RX ADMIN — AZELASTINE HYDROCHLORIDE 137 MCG: 137 SPRAY, METERED NASAL at 08:05

## 2021-05-18 RX ADMIN — METHOCARBAMOL 1000 MG: 500 TABLET ORAL at 09:05

## 2021-05-18 RX ADMIN — CLONIDINE HYDROCHLORIDE 0.1 MG: 0.1 TABLET ORAL at 02:05

## 2021-05-18 RX ADMIN — HYDROCODONE BITARTRATE AND ACETAMINOPHEN 1 TABLET: 10; 325 TABLET ORAL at 04:05

## 2021-05-18 RX ADMIN — SODIUM BICARBONATE 650 MG TABLET 1300 MG: at 06:05

## 2021-05-18 RX ADMIN — SODIUM BICARBONATE 650 MG TABLET 1300 MG: at 08:05

## 2021-05-18 RX ADMIN — AMLODIPINE BESYLATE 10 MG: 10 TABLET ORAL at 09:05

## 2021-05-18 RX ADMIN — CLONIDINE HYDROCHLORIDE 0.1 MG: 0.1 TABLET ORAL at 08:05

## 2021-05-18 RX ADMIN — PROPRANOLOL HYDROCHLORIDE 80 MG: 80 CAPSULE, EXTENDED RELEASE ORAL at 09:05

## 2021-05-18 RX ADMIN — AZELASTINE HYDROCHLORIDE 137 MCG: 137 SPRAY, METERED NASAL at 12:05

## 2021-05-18 RX ADMIN — CEFTRIAXONE 1 G: 1 INJECTION, POWDER, FOR SOLUTION INTRAMUSCULAR; INTRAVENOUS at 09:05

## 2021-05-18 RX ADMIN — MUPIROCIN: 20 OINTMENT TOPICAL at 10:05

## 2021-05-18 RX ADMIN — POLYETHYLENE GLYCOL 3350 17 G: 17 POWDER, FOR SOLUTION ORAL at 09:05

## 2021-05-18 RX ADMIN — INSULIN ASPART 1 UNITS: 100 INJECTION, SOLUTION INTRAVENOUS; SUBCUTANEOUS at 08:05

## 2021-05-18 RX ADMIN — SODIUM BICARBONATE: 84 INJECTION, SOLUTION INTRAVENOUS at 04:05

## 2021-05-18 RX ADMIN — SODIUM BICARBONATE 650 MG TABLET 1300 MG: at 10:05

## 2021-05-18 RX ADMIN — INSULIN DETEMIR 15 UNITS: 100 INJECTION, SOLUTION SUBCUTANEOUS at 08:05

## 2021-05-18 RX ADMIN — VENLAFAXINE HYDROCHLORIDE 150 MG: 150 CAPSULE, EXTENDED RELEASE ORAL at 09:05

## 2021-05-18 RX ADMIN — CETIRIZINE HYDROCHLORIDE 5 MG: 5 TABLET ORAL at 08:05

## 2021-05-18 RX ADMIN — HYDROCODONE BITARTRATE AND ACETAMINOPHEN 1 TABLET: 10; 325 TABLET ORAL at 08:05

## 2021-05-18 RX ADMIN — METHOCARBAMOL 1000 MG: 500 TABLET ORAL at 12:05

## 2021-05-18 RX ADMIN — DOCUSATE SODIUM 50MG AND SENNOSIDES 8.6MG 2 TABLET: 8.6; 5 TABLET, FILM COATED ORAL at 12:05

## 2021-05-18 RX ADMIN — LEVOTHYROXINE SODIUM 50 MCG: 50 TABLET ORAL at 05:05

## 2021-05-18 RX ADMIN — MUPIROCIN: 20 OINTMENT TOPICAL at 08:05

## 2021-05-18 RX ADMIN — OXYBUTYNIN CHLORIDE 10 MG: 10 TABLET, EXTENDED RELEASE ORAL at 09:05

## 2021-05-19 DIAGNOSIS — E11.69 HYPERLIPIDEMIA ASSOCIATED WITH TYPE 2 DIABETES MELLITUS: ICD-10-CM

## 2021-05-19 DIAGNOSIS — E78.5 HYPERLIPIDEMIA ASSOCIATED WITH TYPE 2 DIABETES MELLITUS: ICD-10-CM

## 2021-05-19 LAB
ALBUMIN SERPL BCP-MCNC: 1.8 G/DL (ref 3.5–5.2)
ALBUMIN/CREAT UR: 3545.8 UG/MG (ref 0–30)
ANION GAP SERPL CALC-SCNC: 17 MMOL/L (ref 8–16)
ANISOCYTOSIS BLD QL SMEAR: SLIGHT
BACTERIA #/AREA URNS AUTO: ABNORMAL /HPF
BASOPHILS # BLD AUTO: ABNORMAL K/UL (ref 0–0.2)
BASOPHILS NFR BLD: 0 % (ref 0–1.9)
BILIRUB UR QL STRIP: NEGATIVE
BUN SERPL-MCNC: 44 MG/DL (ref 8–23)
CALCIUM SERPL-MCNC: 7.8 MG/DL (ref 8.7–10.5)
CHLORIDE SERPL-SCNC: 87 MMOL/L (ref 95–110)
CLARITY UR REFRACT.AUTO: ABNORMAL
CO2 SERPL-SCNC: 21 MMOL/L (ref 23–29)
COLOR UR AUTO: YELLOW
CREAT SERPL-MCNC: 4.5 MG/DL (ref 0.5–1.4)
CREAT UR-MCNC: 24 MG/DL (ref 15–325)
DIFFERENTIAL METHOD: ABNORMAL
EOSINOPHIL # BLD AUTO: ABNORMAL K/UL (ref 0–0.5)
EOSINOPHIL NFR BLD: 3 % (ref 0–8)
ERYTHROCYTE [DISTWIDTH] IN BLOOD BY AUTOMATED COUNT: 13.6 % (ref 11.5–14.5)
EST. GFR  (AFRICAN AMERICAN): 10.9 ML/MIN/1.73 M^2
EST. GFR  (NON AFRICAN AMERICAN): 9.4 ML/MIN/1.73 M^2
GLUCOSE SERPL-MCNC: 83 MG/DL (ref 70–110)
GLUCOSE UR QL STRIP: NEGATIVE
HCT VFR BLD AUTO: 31.6 % (ref 37–48.5)
HGB BLD-MCNC: 10.6 G/DL (ref 12–16)
HGB UR QL STRIP: ABNORMAL
HYALINE CASTS UR QL AUTO: 0 /LPF
HYPOCHROMIA BLD QL SMEAR: ABNORMAL
IMM GRANULOCYTES # BLD AUTO: ABNORMAL K/UL (ref 0–0.04)
IMM GRANULOCYTES NFR BLD AUTO: ABNORMAL % (ref 0–0.5)
KETONES UR QL STRIP: NEGATIVE
LEUKOCYTE ESTERASE UR QL STRIP: ABNORMAL
LYMPHOCYTES # BLD AUTO: ABNORMAL K/UL (ref 1–4.8)
LYMPHOCYTES NFR BLD: 8 % (ref 18–48)
MAGNESIUM SERPL-MCNC: 1.7 MG/DL (ref 1.6–2.6)
MCH RBC QN AUTO: 28.1 PG (ref 27–31)
MCHC RBC AUTO-ENTMCNC: 33.5 G/DL (ref 32–36)
MCV RBC AUTO: 84 FL (ref 82–98)
MICROALBUMIN UR DL<=1MG/L-MCNC: 851 UG/ML
MICROSCOPIC COMMENT: ABNORMAL
MONOCYTES # BLD AUTO: ABNORMAL K/UL (ref 0.3–1)
MONOCYTES NFR BLD: 10 % (ref 4–15)
NEUTROPHILS NFR BLD: 78 % (ref 38–73)
NEUTS BAND NFR BLD MANUAL: 1 %
NITRITE UR QL STRIP: NEGATIVE
NRBC BLD-RTO: 0 /100 WBC
OVALOCYTES BLD QL SMEAR: ABNORMAL
PH UR STRIP: 7 [PH] (ref 5–8)
PHOSPHATE SERPL-MCNC: 4.6 MG/DL (ref 2.7–4.5)
PHOSPHATE SERPL-MCNC: 4.6 MG/DL (ref 2.7–4.5)
PLATELET # BLD AUTO: 167 K/UL (ref 150–450)
PMV BLD AUTO: 10.8 FL (ref 9.2–12.9)
POCT GLUCOSE: 83 MG/DL (ref 70–110)
POIKILOCYTOSIS BLD QL SMEAR: SLIGHT
POLYCHROMASIA BLD QL SMEAR: ABNORMAL
POTASSIUM SERPL-SCNC: 4 MMOL/L (ref 3.5–5.1)
PROT UR QL STRIP: ABNORMAL
PROT UR-MCNC: 215 MG/DL (ref 0–15)
PROT UR-MCNC: 215 MG/DL (ref 0–15)
PROT/CREAT UR: 8.96 MG/G{CREAT} (ref 0–0.2)
PROT/CREAT UR: 8.96 MG/G{CREAT} (ref 0–0.2)
RBC # BLD AUTO: 3.77 M/UL (ref 4–5.4)
RBC #/AREA URNS AUTO: 24 /HPF (ref 0–4)
SODIUM SERPL-SCNC: 125 MMOL/L (ref 136–145)
SP GR UR STRIP: 1.01 (ref 1–1.03)
SQUAMOUS #/AREA URNS AUTO: 2 /HPF
URN SPEC COLLECT METH UR: ABNORMAL
WBC # BLD AUTO: 11.91 K/UL (ref 3.9–12.7)
WBC #/AREA URNS AUTO: >100 /HPF (ref 0–5)
WBC CLUMPS UR QL AUTO: ABNORMAL

## 2021-05-19 PROCEDURE — 85007 BL SMEAR W/DIFF WBC COUNT: CPT | Performed by: STUDENT IN AN ORGANIZED HEALTH CARE EDUCATION/TRAINING PROGRAM

## 2021-05-19 PROCEDURE — 36415 COLL VENOUS BLD VENIPUNCTURE: CPT | Performed by: STUDENT IN AN ORGANIZED HEALTH CARE EDUCATION/TRAINING PROGRAM

## 2021-05-19 PROCEDURE — 81001 URINALYSIS AUTO W/SCOPE: CPT | Performed by: STUDENT IN AN ORGANIZED HEALTH CARE EDUCATION/TRAINING PROGRAM

## 2021-05-19 PROCEDURE — 63600175 PHARM REV CODE 636 W HCPCS: Mod: JG | Performed by: GENERAL PRACTICE

## 2021-05-19 PROCEDURE — 83735 ASSAY OF MAGNESIUM: CPT | Performed by: STUDENT IN AN ORGANIZED HEALTH CARE EDUCATION/TRAINING PROGRAM

## 2021-05-19 PROCEDURE — 84156 ASSAY OF PROTEIN URINE: CPT | Performed by: GENERAL PRACTICE

## 2021-05-19 PROCEDURE — 99900035 HC TECH TIME PER 15 MIN (STAT)

## 2021-05-19 PROCEDURE — 80069 RENAL FUNCTION PANEL: CPT | Performed by: STUDENT IN AN ORGANIZED HEALTH CARE EDUCATION/TRAINING PROGRAM

## 2021-05-19 PROCEDURE — 90935 HEMODIALYSIS ONE EVALUATION: CPT

## 2021-05-19 PROCEDURE — 97530 THERAPEUTIC ACTIVITIES: CPT

## 2021-05-19 PROCEDURE — 97116 GAIT TRAINING THERAPY: CPT

## 2021-05-19 PROCEDURE — 63600175 PHARM REV CODE 636 W HCPCS: Performed by: STUDENT IN AN ORGANIZED HEALTH CARE EDUCATION/TRAINING PROGRAM

## 2021-05-19 PROCEDURE — 99232 SBSQ HOSP IP/OBS MODERATE 35: CPT | Mod: ,,, | Performed by: INTERNAL MEDICINE

## 2021-05-19 PROCEDURE — 99232 PR SUBSEQUENT HOSPITAL CARE,LEVL II: ICD-10-PCS | Mod: ,,, | Performed by: INTERNAL MEDICINE

## 2021-05-19 PROCEDURE — 20600001 HC STEP DOWN PRIVATE ROOM

## 2021-05-19 PROCEDURE — 94761 N-INVAS EAR/PLS OXIMETRY MLT: CPT

## 2021-05-19 PROCEDURE — 99233 SBSQ HOSP IP/OBS HIGH 50: CPT | Mod: ,,, | Performed by: STUDENT IN AN ORGANIZED HEALTH CARE EDUCATION/TRAINING PROGRAM

## 2021-05-19 PROCEDURE — 87086 URINE CULTURE/COLONY COUNT: CPT | Performed by: STUDENT IN AN ORGANIZED HEALTH CARE EDUCATION/TRAINING PROGRAM

## 2021-05-19 PROCEDURE — 82043 UR ALBUMIN QUANTITATIVE: CPT | Performed by: GENERAL PRACTICE

## 2021-05-19 PROCEDURE — 36593 DECLOT VASCULAR DEVICE: CPT

## 2021-05-19 PROCEDURE — 85027 COMPLETE CBC AUTOMATED: CPT | Performed by: STUDENT IN AN ORGANIZED HEALTH CARE EDUCATION/TRAINING PROGRAM

## 2021-05-19 PROCEDURE — 25000003 PHARM REV CODE 250: Performed by: STUDENT IN AN ORGANIZED HEALTH CARE EDUCATION/TRAINING PROGRAM

## 2021-05-19 PROCEDURE — 99233 PR SUBSEQUENT HOSPITAL CARE,LEVL III: ICD-10-PCS | Mod: ,,, | Performed by: STUDENT IN AN ORGANIZED HEALTH CARE EDUCATION/TRAINING PROGRAM

## 2021-05-19 PROCEDURE — 97535 SELF CARE MNGMENT TRAINING: CPT

## 2021-05-19 PROCEDURE — 25000003 PHARM REV CODE 250: Performed by: HOSPITALIST

## 2021-05-19 RX ORDER — SODIUM CHLORIDE 9 MG/ML
INJECTION, SOLUTION INTRAVENOUS ONCE
Status: DISCONTINUED | OUTPATIENT
Start: 2021-05-19 | End: 2021-05-20

## 2021-05-19 RX ORDER — CLONIDINE HYDROCHLORIDE 0.1 MG/1
0.3 TABLET ORAL 3 TIMES DAILY
Status: DISCONTINUED | OUTPATIENT
Start: 2021-05-20 | End: 2021-06-02 | Stop reason: HOSPADM

## 2021-05-19 RX ORDER — CLONIDINE HYDROCHLORIDE 0.1 MG/1
0.2 TABLET ORAL 3 TIMES DAILY
Status: DISCONTINUED | OUTPATIENT
Start: 2021-05-19 | End: 2021-05-19

## 2021-05-19 RX ADMIN — SODIUM BICARBONATE 650 MG TABLET 1300 MG: at 04:05

## 2021-05-19 RX ADMIN — CLONIDINE HYDROCHLORIDE 0.1 MG: 0.1 TABLET ORAL at 08:05

## 2021-05-19 RX ADMIN — AMLODIPINE BESYLATE 10 MG: 10 TABLET ORAL at 08:05

## 2021-05-19 RX ADMIN — MUPIROCIN: 20 OINTMENT TOPICAL at 08:05

## 2021-05-19 RX ADMIN — POLYETHYLENE GLYCOL 3350 17 G: 17 POWDER, FOR SOLUTION ORAL at 08:05

## 2021-05-19 RX ADMIN — MUPIROCIN: 20 OINTMENT TOPICAL at 09:05

## 2021-05-19 RX ADMIN — CLONIDINE HYDROCHLORIDE 0.1 MG: 0.1 TABLET ORAL at 10:05

## 2021-05-19 RX ADMIN — HYDROCODONE BITARTRATE AND ACETAMINOPHEN 1 TABLET: 10; 325 TABLET ORAL at 03:05

## 2021-05-19 RX ADMIN — PROPRANOLOL HYDROCHLORIDE 80 MG: 80 CAPSULE, EXTENDED RELEASE ORAL at 08:05

## 2021-05-19 RX ADMIN — CLONIDINE HYDROCHLORIDE 0.2 MG: 0.1 TABLET ORAL at 09:05

## 2021-05-19 RX ADMIN — INSULIN DETEMIR 15 UNITS: 100 INJECTION, SOLUTION SUBCUTANEOUS at 09:05

## 2021-05-19 RX ADMIN — SODIUM BICARBONATE 650 MG TABLET 1300 MG: at 09:05

## 2021-05-19 RX ADMIN — OXYBUTYNIN CHLORIDE 10 MG: 10 TABLET, EXTENDED RELEASE ORAL at 08:05

## 2021-05-19 RX ADMIN — LEVOTHYROXINE SODIUM 50 MCG: 50 TABLET ORAL at 05:05

## 2021-05-19 RX ADMIN — AZELASTINE HYDROCHLORIDE 137 MCG: 137 SPRAY, METERED NASAL at 09:05

## 2021-05-19 RX ADMIN — SODIUM BICARBONATE 650 MG TABLET 1300 MG: at 08:05

## 2021-05-19 RX ADMIN — ALTEPLASE 4 MG: 2.2 INJECTION, POWDER, LYOPHILIZED, FOR SOLUTION INTRAVENOUS at 02:05

## 2021-05-19 RX ADMIN — CLONIDINE HYDROCHLORIDE 0.2 MG: 0.1 TABLET ORAL at 04:05

## 2021-05-19 RX ADMIN — VENLAFAXINE HYDROCHLORIDE 150 MG: 150 CAPSULE, EXTENDED RELEASE ORAL at 08:05

## 2021-05-19 RX ADMIN — CEFTRIAXONE 1 G: 1 INJECTION, POWDER, FOR SOLUTION INTRAMUSCULAR; INTRAVENOUS at 04:05

## 2021-05-19 RX ADMIN — HYDROCODONE BITARTRATE AND ACETAMINOPHEN 1 TABLET: 10; 325 TABLET ORAL at 01:05

## 2021-05-19 RX ADMIN — AZELASTINE HYDROCHLORIDE 137 MCG: 137 SPRAY, METERED NASAL at 08:05

## 2021-05-19 RX ADMIN — CETIRIZINE HYDROCHLORIDE 5 MG: 5 TABLET ORAL at 09:05

## 2021-05-19 RX ADMIN — ROSUVASTATIN CALCIUM 20 MG: 20 TABLET, FILM COATED ORAL at 08:05

## 2021-05-20 LAB
ALBUMIN SERPL BCP-MCNC: 1.6 G/DL (ref 3.5–5.2)
ANION GAP SERPL CALC-SCNC: 16 MMOL/L (ref 8–16)
ANION GAP SERPL CALC-SCNC: 18 MMOL/L (ref 8–16)
BACTERIA UR CULT: NO GROWTH
BUN SERPL-MCNC: 46 MG/DL (ref 8–23)
BUN SERPL-MCNC: 46 MG/DL (ref 8–23)
CALCIUM SERPL-MCNC: 7.1 MG/DL (ref 8.7–10.5)
CALCIUM SERPL-MCNC: 7.4 MG/DL (ref 8.7–10.5)
CHLORIDE SERPL-SCNC: 84 MMOL/L (ref 95–110)
CHLORIDE SERPL-SCNC: 85 MMOL/L (ref 95–110)
CO2 SERPL-SCNC: 19 MMOL/L (ref 23–29)
CO2 SERPL-SCNC: 21 MMOL/L (ref 23–29)
CREAT SERPL-MCNC: 4.3 MG/DL (ref 0.5–1.4)
CREAT SERPL-MCNC: 4.7 MG/DL (ref 0.5–1.4)
EST. GFR  (AFRICAN AMERICAN): 10.3 ML/MIN/1.73 M^2
EST. GFR  (AFRICAN AMERICAN): 11.5 ML/MIN/1.73 M^2
EST. GFR  (NON AFRICAN AMERICAN): 8.9 ML/MIN/1.73 M^2
EST. GFR  (NON AFRICAN AMERICAN): 9.9 ML/MIN/1.73 M^2
GLUCOSE SERPL-MCNC: 111 MG/DL (ref 70–110)
GLUCOSE SERPL-MCNC: 202 MG/DL (ref 70–110)
PHOSPHATE SERPL-MCNC: 5.5 MG/DL (ref 2.7–4.5)
POTASSIUM SERPL-SCNC: 4.2 MMOL/L (ref 3.5–5.1)
POTASSIUM SERPL-SCNC: 4.4 MMOL/L (ref 3.5–5.1)
SODIUM SERPL-SCNC: 121 MMOL/L (ref 136–145)
SODIUM SERPL-SCNC: 122 MMOL/L (ref 136–145)

## 2021-05-20 PROCEDURE — 99232 SBSQ HOSP IP/OBS MODERATE 35: CPT | Mod: ,,, | Performed by: INTERNAL MEDICINE

## 2021-05-20 PROCEDURE — 99233 PR SUBSEQUENT HOSPITAL CARE,LEVL III: ICD-10-PCS | Mod: ,,, | Performed by: INTERNAL MEDICINE

## 2021-05-20 PROCEDURE — 25000003 PHARM REV CODE 250: Performed by: INTERNAL MEDICINE

## 2021-05-20 PROCEDURE — 90935 HEMODIALYSIS ONE EVALUATION: CPT

## 2021-05-20 PROCEDURE — 80069 RENAL FUNCTION PANEL: CPT | Performed by: STUDENT IN AN ORGANIZED HEALTH CARE EDUCATION/TRAINING PROGRAM

## 2021-05-20 PROCEDURE — 20600001 HC STEP DOWN PRIVATE ROOM

## 2021-05-20 PROCEDURE — 99233 SBSQ HOSP IP/OBS HIGH 50: CPT | Mod: ,,, | Performed by: INTERNAL MEDICINE

## 2021-05-20 PROCEDURE — 36415 COLL VENOUS BLD VENIPUNCTURE: CPT | Performed by: INTERNAL MEDICINE

## 2021-05-20 PROCEDURE — 63600175 PHARM REV CODE 636 W HCPCS: Performed by: STUDENT IN AN ORGANIZED HEALTH CARE EDUCATION/TRAINING PROGRAM

## 2021-05-20 PROCEDURE — 25000003 PHARM REV CODE 250: Performed by: STUDENT IN AN ORGANIZED HEALTH CARE EDUCATION/TRAINING PROGRAM

## 2021-05-20 PROCEDURE — 36415 COLL VENOUS BLD VENIPUNCTURE: CPT | Performed by: STUDENT IN AN ORGANIZED HEALTH CARE EDUCATION/TRAINING PROGRAM

## 2021-05-20 PROCEDURE — 80048 BASIC METABOLIC PNL TOTAL CA: CPT | Performed by: INTERNAL MEDICINE

## 2021-05-20 PROCEDURE — 63600175 PHARM REV CODE 636 W HCPCS: Performed by: INTERNAL MEDICINE

## 2021-05-20 PROCEDURE — 99232 PR SUBSEQUENT HOSPITAL CARE,LEVL II: ICD-10-PCS | Mod: ,,, | Performed by: INTERNAL MEDICINE

## 2021-05-20 PROCEDURE — 25000003 PHARM REV CODE 250: Performed by: HOSPITALIST

## 2021-05-20 RX ORDER — HEPARIN SODIUM 5000 [USP'U]/ML
5000 INJECTION, SOLUTION INTRAVENOUS; SUBCUTANEOUS EVERY 8 HOURS
Status: DISCONTINUED | OUTPATIENT
Start: 2021-05-20 | End: 2021-06-01

## 2021-05-20 RX ORDER — SODIUM CHLORIDE 9 MG/ML
INJECTION, SOLUTION INTRAVENOUS ONCE
Status: DISCONTINUED | OUTPATIENT
Start: 2021-05-20 | End: 2021-05-20

## 2021-05-20 RX ADMIN — HYDROCODONE BITARTRATE AND ACETAMINOPHEN 1 TABLET: 10; 325 TABLET ORAL at 05:05

## 2021-05-20 RX ADMIN — POLYETHYLENE GLYCOL 3350 17 G: 17 POWDER, FOR SOLUTION ORAL at 07:05

## 2021-05-20 RX ADMIN — SODIUM BICARBONATE 650 MG TABLET 1300 MG: at 04:05

## 2021-05-20 RX ADMIN — PROPRANOLOL HYDROCHLORIDE 80 MG: 80 CAPSULE, EXTENDED RELEASE ORAL at 07:05

## 2021-05-20 RX ADMIN — VENLAFAXINE HYDROCHLORIDE 150 MG: 150 CAPSULE, EXTENDED RELEASE ORAL at 07:05

## 2021-05-20 RX ADMIN — CETIRIZINE HYDROCHLORIDE 5 MG: 5 TABLET ORAL at 09:05

## 2021-05-20 RX ADMIN — HEPARIN SODIUM 5000 UNITS: 5000 INJECTION INTRAVENOUS; SUBCUTANEOUS at 09:05

## 2021-05-20 RX ADMIN — CLONIDINE HYDROCHLORIDE 0.3 MG: 0.1 TABLET ORAL at 07:05

## 2021-05-20 RX ADMIN — OXYBUTYNIN CHLORIDE 10 MG: 10 TABLET, EXTENDED RELEASE ORAL at 07:05

## 2021-05-20 RX ADMIN — ROSUVASTATIN CALCIUM 20 MG: 20 TABLET, FILM COATED ORAL at 07:05

## 2021-05-20 RX ADMIN — AZELASTINE HYDROCHLORIDE 137 MCG: 137 SPRAY, METERED NASAL at 07:05

## 2021-05-20 RX ADMIN — AZELASTINE HYDROCHLORIDE 137 MCG: 137 SPRAY, METERED NASAL at 09:05

## 2021-05-20 RX ADMIN — INSULIN ASPART 2 UNITS: 100 INJECTION, SOLUTION INTRAVENOUS; SUBCUTANEOUS at 04:05

## 2021-05-20 RX ADMIN — CLONIDINE HYDROCHLORIDE 0.3 MG: 0.1 TABLET ORAL at 09:05

## 2021-05-20 RX ADMIN — MUPIROCIN: 20 OINTMENT TOPICAL at 09:05

## 2021-05-20 RX ADMIN — HYDROCODONE BITARTRATE AND ACETAMINOPHEN 1 TABLET: 10; 325 TABLET ORAL at 09:05

## 2021-05-20 RX ADMIN — METHOCARBAMOL 1000 MG: 500 TABLET ORAL at 07:05

## 2021-05-20 RX ADMIN — CEFTRIAXONE 1 G: 1 INJECTION, POWDER, FOR SOLUTION INTRAMUSCULAR; INTRAVENOUS at 04:05

## 2021-05-20 RX ADMIN — LEVOTHYROXINE SODIUM 50 MCG: 50 TABLET ORAL at 05:05

## 2021-05-20 RX ADMIN — CLONIDINE HYDROCHLORIDE 0.3 MG: 0.1 TABLET ORAL at 04:05

## 2021-05-20 RX ADMIN — AMLODIPINE BESYLATE 10 MG: 10 TABLET ORAL at 07:05

## 2021-05-20 RX ADMIN — METHOCARBAMOL 1000 MG: 500 TABLET ORAL at 09:05

## 2021-05-20 RX ADMIN — SODIUM BICARBONATE 650 MG TABLET 1300 MG: at 07:05

## 2021-05-20 RX ADMIN — SODIUM BICARBONATE 650 MG TABLET 1300 MG: at 09:05

## 2021-05-20 RX ADMIN — INSULIN DETEMIR 15 UNITS: 100 INJECTION, SOLUTION SUBCUTANEOUS at 09:05

## 2021-05-20 RX ADMIN — MUPIROCIN: 20 OINTMENT TOPICAL at 08:05

## 2021-05-20 RX ADMIN — INSULIN ASPART 1 UNITS: 100 INJECTION, SOLUTION INTRAVENOUS; SUBCUTANEOUS at 09:05

## 2021-05-20 RX ADMIN — FUROSEMIDE 160 MG: 10 INJECTION, SOLUTION INTRAMUSCULAR; INTRAVENOUS at 11:05

## 2021-05-21 PROBLEM — N30.00 ACUTE CYSTITIS: Status: ACTIVE | Noted: 2021-05-21

## 2021-05-21 LAB
ALBUMIN SERPL BCP-MCNC: 1.7 G/DL (ref 3.5–5.2)
ANION GAP SERPL CALC-SCNC: 13 MMOL/L (ref 8–16)
ANION GAP SERPL CALC-SCNC: 14 MMOL/L (ref 8–16)
ANION GAP SERPL CALC-SCNC: 15 MMOL/L (ref 8–16)
ANION GAP SERPL CALC-SCNC: 15 MMOL/L (ref 8–16)
BUN SERPL-MCNC: 47 MG/DL (ref 8–23)
BUN SERPL-MCNC: 47 MG/DL (ref 8–23)
BUN SERPL-MCNC: 48 MG/DL (ref 8–23)
BUN SERPL-MCNC: 49 MG/DL (ref 8–23)
CALCIUM SERPL-MCNC: 7.2 MG/DL (ref 8.7–10.5)
CALCIUM SERPL-MCNC: 7.2 MG/DL (ref 8.7–10.5)
CALCIUM SERPL-MCNC: 7.4 MG/DL (ref 8.7–10.5)
CALCIUM SERPL-MCNC: 7.4 MG/DL (ref 8.7–10.5)
CALCIUM SERPL-MCNC: 7.5 MG/DL (ref 8.7–10.5)
CALCIUM SERPL-MCNC: 7.5 MG/DL (ref 8.7–10.5)
CHLORIDE SERPL-SCNC: 80 MMOL/L (ref 95–110)
CHLORIDE SERPL-SCNC: 81 MMOL/L (ref 95–110)
CHLORIDE SERPL-SCNC: 81 MMOL/L (ref 95–110)
CHLORIDE SERPL-SCNC: 83 MMOL/L (ref 95–110)
CHLORIDE UR-SCNC: 57 MMOL/L (ref 25–200)
CHOLEST SERPL-MCNC: 167 MG/DL (ref 120–199)
CHOLEST/HDLC SERPL: 9.3 {RATIO} (ref 2–5)
CO2 SERPL-SCNC: 18 MMOL/L (ref 23–29)
CO2 SERPL-SCNC: 21 MMOL/L (ref 23–29)
CO2 SERPL-SCNC: 23 MMOL/L (ref 23–29)
CO2 SERPL-SCNC: 24 MMOL/L (ref 23–29)
CREAT SERPL-MCNC: 4.9 MG/DL (ref 0.5–1.4)
CREAT SERPL-MCNC: 5 MG/DL (ref 0.5–1.4)
EST. GFR  (AFRICAN AMERICAN): 9.6 ML/MIN/1.73 M^2
EST. GFR  (AFRICAN AMERICAN): 9.8 ML/MIN/1.73 M^2
EST. GFR  (NON AFRICAN AMERICAN): 8.3 ML/MIN/1.73 M^2
EST. GFR  (NON AFRICAN AMERICAN): 8.5 ML/MIN/1.73 M^2
GLUCOSE SERPL-MCNC: 158 MG/DL (ref 70–110)
GLUCOSE SERPL-MCNC: 158 MG/DL (ref 70–110)
GLUCOSE SERPL-MCNC: 170 MG/DL (ref 70–110)
GLUCOSE SERPL-MCNC: 181 MG/DL (ref 70–110)
GLUCOSE SERPL-MCNC: 188 MG/DL (ref 70–110)
GLUCOSE SERPL-MCNC: 199 MG/DL (ref 70–110)
HDLC SERPL-MCNC: 18 MG/DL (ref 40–75)
HDLC SERPL: 10.8 % (ref 20–50)
LDLC SERPL CALC-MCNC: 93 MG/DL (ref 63–159)
NONHDLC SERPL-MCNC: 149 MG/DL
OSMOLALITY SERPL: 270 MOSM/KG (ref 275–295)
OSMOLALITY UR: 187 MOSM/KG (ref 50–1200)
PHOSPHATE SERPL-MCNC: 5.6 MG/DL (ref 2.7–4.5)
POCT GLUCOSE: 185 MG/DL (ref 70–110)
POTASSIUM SERPL-SCNC: 4 MMOL/L (ref 3.5–5.1)
POTASSIUM SERPL-SCNC: 4 MMOL/L (ref 3.5–5.1)
POTASSIUM SERPL-SCNC: 4.1 MMOL/L (ref 3.5–5.1)
POTASSIUM SERPL-SCNC: 4.3 MMOL/L (ref 3.5–5.1)
POTASSIUM SERPL-SCNC: 4.5 MMOL/L (ref 3.5–5.1)
POTASSIUM SERPL-SCNC: 4.6 MMOL/L (ref 3.5–5.1)
POTASSIUM UR-SCNC: <10 MMOL/L (ref 15–95)
SODIUM SERPL-SCNC: 112 MMOL/L (ref 136–145)
SODIUM SERPL-SCNC: 115 MMOL/L (ref 136–145)
SODIUM SERPL-SCNC: 117 MMOL/L (ref 136–145)
SODIUM SERPL-SCNC: 117 MMOL/L (ref 136–145)
SODIUM SERPL-SCNC: 118 MMOL/L (ref 136–145)
SODIUM SERPL-SCNC: 119 MMOL/L (ref 136–145)
SODIUM UR-SCNC: 60 MMOL/L (ref 20–250)
TRIGL SERPL-MCNC: 280 MG/DL (ref 30–150)

## 2021-05-21 PROCEDURE — 82436 ASSAY OF URINE CHLORIDE: CPT | Performed by: INTERNAL MEDICINE

## 2021-05-21 PROCEDURE — 80061 LIPID PANEL: CPT | Performed by: INTERNAL MEDICINE

## 2021-05-21 PROCEDURE — 99233 PR SUBSEQUENT HOSPITAL CARE,LEVL III: ICD-10-PCS | Mod: GC,,, | Performed by: INTERNAL MEDICINE

## 2021-05-21 PROCEDURE — 36415 COLL VENOUS BLD VENIPUNCTURE: CPT | Performed by: INTERNAL MEDICINE

## 2021-05-21 PROCEDURE — 99233 SBSQ HOSP IP/OBS HIGH 50: CPT | Mod: GC,,, | Performed by: INTERNAL MEDICINE

## 2021-05-21 PROCEDURE — C9399 UNCLASSIFIED DRUGS OR BIOLOG: HCPCS | Performed by: STUDENT IN AN ORGANIZED HEALTH CARE EDUCATION/TRAINING PROGRAM

## 2021-05-21 PROCEDURE — 25000003 PHARM REV CODE 250: Performed by: INTERNAL MEDICINE

## 2021-05-21 PROCEDURE — 83930 ASSAY OF BLOOD OSMOLALITY: CPT | Performed by: INTERNAL MEDICINE

## 2021-05-21 PROCEDURE — 84295 ASSAY OF SERUM SODIUM: CPT | Performed by: INTERNAL MEDICINE

## 2021-05-21 PROCEDURE — 36415 COLL VENOUS BLD VENIPUNCTURE: CPT | Performed by: STUDENT IN AN ORGANIZED HEALTH CARE EDUCATION/TRAINING PROGRAM

## 2021-05-21 PROCEDURE — 25000003 PHARM REV CODE 250: Performed by: STUDENT IN AN ORGANIZED HEALTH CARE EDUCATION/TRAINING PROGRAM

## 2021-05-21 PROCEDURE — 97530 THERAPEUTIC ACTIVITIES: CPT

## 2021-05-21 PROCEDURE — 97110 THERAPEUTIC EXERCISES: CPT

## 2021-05-21 PROCEDURE — 63600175 PHARM REV CODE 636 W HCPCS: Performed by: INTERNAL MEDICINE

## 2021-05-21 PROCEDURE — 83935 ASSAY OF URINE OSMOLALITY: CPT | Performed by: INTERNAL MEDICINE

## 2021-05-21 PROCEDURE — 80048 BASIC METABOLIC PNL TOTAL CA: CPT | Performed by: INTERNAL MEDICINE

## 2021-05-21 PROCEDURE — 80069 RENAL FUNCTION PANEL: CPT | Performed by: STUDENT IN AN ORGANIZED HEALTH CARE EDUCATION/TRAINING PROGRAM

## 2021-05-21 PROCEDURE — 63600175 PHARM REV CODE 636 W HCPCS: Performed by: STUDENT IN AN ORGANIZED HEALTH CARE EDUCATION/TRAINING PROGRAM

## 2021-05-21 PROCEDURE — 20000000 HC ICU ROOM

## 2021-05-21 PROCEDURE — 84133 ASSAY OF URINE POTASSIUM: CPT | Performed by: INTERNAL MEDICINE

## 2021-05-21 PROCEDURE — 84300 ASSAY OF URINE SODIUM: CPT | Performed by: INTERNAL MEDICINE

## 2021-05-21 PROCEDURE — 99232 PR SUBSEQUENT HOSPITAL CARE,LEVL II: ICD-10-PCS | Mod: ,,, | Performed by: INTERNAL MEDICINE

## 2021-05-21 PROCEDURE — 25000003 PHARM REV CODE 250: Performed by: HOSPITALIST

## 2021-05-21 PROCEDURE — 99232 SBSQ HOSP IP/OBS MODERATE 35: CPT | Mod: ,,, | Performed by: INTERNAL MEDICINE

## 2021-05-21 RX ORDER — 3% SODIUM CHLORIDE 3 G/100ML
30 INJECTION, SOLUTION INTRAVENOUS CONTINUOUS
Status: DISCONTINUED | OUTPATIENT
Start: 2021-05-21 | End: 2021-05-21

## 2021-05-21 RX ORDER — SUMATRIPTAN 50 MG/1
100 TABLET, FILM COATED ORAL ONCE
Status: COMPLETED | OUTPATIENT
Start: 2021-05-21 | End: 2021-05-21

## 2021-05-21 RX ADMIN — PROPRANOLOL HYDROCHLORIDE 80 MG: 80 CAPSULE, EXTENDED RELEASE ORAL at 08:05

## 2021-05-21 RX ADMIN — HEPARIN SODIUM 5000 UNITS: 5000 INJECTION INTRAVENOUS; SUBCUTANEOUS at 02:05

## 2021-05-21 RX ADMIN — VENLAFAXINE HYDROCHLORIDE 150 MG: 150 CAPSULE, EXTENDED RELEASE ORAL at 08:05

## 2021-05-21 RX ADMIN — SODIUM BICARBONATE 650 MG TABLET 1300 MG: at 02:05

## 2021-05-21 RX ADMIN — FUROSEMIDE 160 MG: 10 INJECTION, SOLUTION INTRAMUSCULAR; INTRAVENOUS at 04:05

## 2021-05-21 RX ADMIN — SODIUM BICARBONATE 650 MG TABLET 1300 MG: at 08:05

## 2021-05-21 RX ADMIN — INSULIN DETEMIR 15 UNITS: 100 INJECTION, SOLUTION SUBCUTANEOUS at 10:05

## 2021-05-21 RX ADMIN — MUPIROCIN: 20 OINTMENT TOPICAL at 08:05

## 2021-05-21 RX ADMIN — HEPARIN SODIUM 5000 UNITS: 5000 INJECTION INTRAVENOUS; SUBCUTANEOUS at 10:05

## 2021-05-21 RX ADMIN — ROSUVASTATIN CALCIUM 20 MG: 20 TABLET, FILM COATED ORAL at 08:05

## 2021-05-21 RX ADMIN — HYDROCODONE BITARTRATE AND ACETAMINOPHEN 1 TABLET: 10; 325 TABLET ORAL at 05:05

## 2021-05-21 RX ADMIN — HYDROCODONE BITARTRATE AND ACETAMINOPHEN 1 TABLET: 10; 325 TABLET ORAL at 10:05

## 2021-05-21 RX ADMIN — HEPARIN SODIUM 5000 UNITS: 5000 INJECTION INTRAVENOUS; SUBCUTANEOUS at 06:05

## 2021-05-21 RX ADMIN — CEFTRIAXONE 1 G: 1 INJECTION, POWDER, FOR SOLUTION INTRAMUSCULAR; INTRAVENOUS at 06:05

## 2021-05-21 RX ADMIN — CLONIDINE HYDROCHLORIDE 0.3 MG: 0.1 TABLET ORAL at 10:05

## 2021-05-21 RX ADMIN — METHOCARBAMOL 1000 MG: 500 TABLET ORAL at 05:05

## 2021-05-21 RX ADMIN — AMLODIPINE BESYLATE 10 MG: 10 TABLET ORAL at 08:05

## 2021-05-21 RX ADMIN — OXYBUTYNIN CHLORIDE 10 MG: 10 TABLET, EXTENDED RELEASE ORAL at 08:05

## 2021-05-21 RX ADMIN — SUMATRIPTAN SUCCINATE 100 MG: 50 TABLET, FILM COATED ORAL at 06:05

## 2021-05-21 RX ADMIN — SODIUM BICARBONATE 650 MG TABLET 1300 MG: at 10:05

## 2021-05-21 RX ADMIN — CETIRIZINE HYDROCHLORIDE 5 MG: 5 TABLET ORAL at 10:05

## 2021-05-21 RX ADMIN — LEVOTHYROXINE SODIUM 50 MCG: 50 TABLET ORAL at 06:05

## 2021-05-21 RX ADMIN — CLONIDINE HYDROCHLORIDE 0.3 MG: 0.1 TABLET ORAL at 02:05

## 2021-05-21 RX ADMIN — CLONIDINE HYDROCHLORIDE 0.3 MG: 0.1 TABLET ORAL at 08:05

## 2021-05-21 RX ADMIN — AZELASTINE HYDROCHLORIDE 137 MCG: 137 SPRAY, METERED NASAL at 08:05

## 2021-05-22 LAB
ALBUMIN SERPL BCP-MCNC: 1.8 G/DL (ref 3.5–5.2)
ANION GAP SERPL CALC-SCNC: 12 MMOL/L (ref 8–16)
ANION GAP SERPL CALC-SCNC: 13 MMOL/L (ref 8–16)
ANION GAP SERPL CALC-SCNC: 14 MMOL/L (ref 8–16)
ANION GAP SERPL CALC-SCNC: 14 MMOL/L (ref 8–16)
ANION GAP SERPL CALC-SCNC: 16 MMOL/L (ref 8–16)
BUN SERPL-MCNC: 44 MG/DL (ref 8–23)
BUN SERPL-MCNC: 49 MG/DL (ref 8–23)
BUN SERPL-MCNC: 49 MG/DL (ref 8–23)
BUN SERPL-MCNC: 50 MG/DL (ref 8–23)
BUN SERPL-MCNC: 52 MG/DL (ref 8–23)
CALCIUM SERPL-MCNC: 6.1 MG/DL (ref 8.7–10.5)
CALCIUM SERPL-MCNC: 7 MG/DL (ref 8.7–10.5)
CALCIUM SERPL-MCNC: 7.1 MG/DL (ref 8.7–10.5)
CALCIUM SERPL-MCNC: 7.2 MG/DL (ref 8.7–10.5)
CHLORIDE SERPL-SCNC: 80 MMOL/L (ref 95–110)
CHLORIDE SERPL-SCNC: 80 MMOL/L (ref 95–110)
CHLORIDE SERPL-SCNC: 81 MMOL/L (ref 95–110)
CHLORIDE SERPL-SCNC: 89 MMOL/L (ref 95–110)
CO2 SERPL-SCNC: 21 MMOL/L (ref 23–29)
CO2 SERPL-SCNC: 22 MMOL/L (ref 23–29)
CO2 SERPL-SCNC: 23 MMOL/L (ref 23–29)
CO2 SERPL-SCNC: 24 MMOL/L (ref 23–29)
CO2 SERPL-SCNC: 26 MMOL/L (ref 23–29)
CREAT SERPL-MCNC: 4.1 MG/DL (ref 0.5–1.4)
CREAT SERPL-MCNC: 4.7 MG/DL (ref 0.5–1.4)
CREAT SERPL-MCNC: 4.8 MG/DL (ref 0.5–1.4)
CREAT SERPL-MCNC: 4.8 MG/DL (ref 0.5–1.4)
CREAT SERPL-MCNC: 4.9 MG/DL (ref 0.5–1.4)
CREAT SERPL-MCNC: 5 MG/DL (ref 0.5–1.4)
CREAT SERPL-MCNC: 5 MG/DL (ref 0.5–1.4)
EST. GFR  (AFRICAN AMERICAN): 10 ML/MIN/1.73 M^2
EST. GFR  (AFRICAN AMERICAN): 10 ML/MIN/1.73 M^2
EST. GFR  (AFRICAN AMERICAN): 10.3 ML/MIN/1.73 M^2
EST. GFR  (AFRICAN AMERICAN): 12.1 ML/MIN/1.73 M^2
EST. GFR  (AFRICAN AMERICAN): 9.6 ML/MIN/1.73 M^2
EST. GFR  (AFRICAN AMERICAN): 9.6 ML/MIN/1.73 M^2
EST. GFR  (AFRICAN AMERICAN): 9.8 ML/MIN/1.73 M^2
EST. GFR  (NON AFRICAN AMERICAN): 10.5 ML/MIN/1.73 M^2
EST. GFR  (NON AFRICAN AMERICAN): 8.3 ML/MIN/1.73 M^2
EST. GFR  (NON AFRICAN AMERICAN): 8.3 ML/MIN/1.73 M^2
EST. GFR  (NON AFRICAN AMERICAN): 8.5 ML/MIN/1.73 M^2
EST. GFR  (NON AFRICAN AMERICAN): 8.7 ML/MIN/1.73 M^2
EST. GFR  (NON AFRICAN AMERICAN): 8.7 ML/MIN/1.73 M^2
EST. GFR  (NON AFRICAN AMERICAN): 8.9 ML/MIN/1.73 M^2
GLUCOSE SERPL-MCNC: 132 MG/DL (ref 70–110)
GLUCOSE SERPL-MCNC: 141 MG/DL (ref 70–110)
GLUCOSE SERPL-MCNC: 141 MG/DL (ref 70–110)
GLUCOSE SERPL-MCNC: 144 MG/DL (ref 70–110)
GLUCOSE SERPL-MCNC: 162 MG/DL (ref 70–110)
GLUCOSE SERPL-MCNC: 171 MG/DL (ref 70–110)
GLUCOSE SERPL-MCNC: 181 MG/DL (ref 70–110)
MAGNESIUM SERPL-MCNC: 1.6 MG/DL (ref 1.6–2.6)
PHOSPHATE SERPL-MCNC: 6.1 MG/DL (ref 2.7–4.5)
PHOSPHATE SERPL-MCNC: 6.2 MG/DL (ref 2.7–4.5)
POCT GLUCOSE: 158 MG/DL (ref 70–110)
POCT GLUCOSE: 163 MG/DL (ref 70–110)
POCT GLUCOSE: 210 MG/DL (ref 70–110)
POTASSIUM SERPL-SCNC: 3.4 MMOL/L (ref 3.5–5.1)
POTASSIUM SERPL-SCNC: 3.7 MMOL/L (ref 3.5–5.1)
POTASSIUM SERPL-SCNC: 3.7 MMOL/L (ref 3.5–5.1)
POTASSIUM SERPL-SCNC: 3.8 MMOL/L (ref 3.5–5.1)
POTASSIUM SERPL-SCNC: 3.9 MMOL/L (ref 3.5–5.1)
SODIUM SERPL-SCNC: 118 MMOL/L (ref 136–145)
SODIUM SERPL-SCNC: 119 MMOL/L (ref 136–145)
SODIUM SERPL-SCNC: 122 MMOL/L (ref 136–145)

## 2021-05-22 PROCEDURE — 80048 BASIC METABOLIC PNL TOTAL CA: CPT | Mod: 91 | Performed by: INTERNAL MEDICINE

## 2021-05-22 PROCEDURE — 25000003 PHARM REV CODE 250: Performed by: STUDENT IN AN ORGANIZED HEALTH CARE EDUCATION/TRAINING PROGRAM

## 2021-05-22 PROCEDURE — 20000000 HC ICU ROOM

## 2021-05-22 PROCEDURE — 63600175 PHARM REV CODE 636 W HCPCS: Performed by: STUDENT IN AN ORGANIZED HEALTH CARE EDUCATION/TRAINING PROGRAM

## 2021-05-22 PROCEDURE — 94761 N-INVAS EAR/PLS OXIMETRY MLT: CPT

## 2021-05-22 PROCEDURE — 99233 PR SUBSEQUENT HOSPITAL CARE,LEVL III: ICD-10-PCS | Mod: GC,,, | Performed by: INTERNAL MEDICINE

## 2021-05-22 PROCEDURE — 80048 BASIC METABOLIC PNL TOTAL CA: CPT | Performed by: INTERNAL MEDICINE

## 2021-05-22 PROCEDURE — 93010 EKG 12-LEAD: ICD-10-PCS | Mod: ,,, | Performed by: INTERNAL MEDICINE

## 2021-05-22 PROCEDURE — 83735 ASSAY OF MAGNESIUM: CPT | Performed by: INTERNAL MEDICINE

## 2021-05-22 PROCEDURE — 84295 ASSAY OF SERUM SODIUM: CPT | Mod: 91 | Performed by: INTERNAL MEDICINE

## 2021-05-22 PROCEDURE — 25000003 PHARM REV CODE 250: Performed by: HOSPITALIST

## 2021-05-22 PROCEDURE — 63600175 PHARM REV CODE 636 W HCPCS: Performed by: INTERNAL MEDICINE

## 2021-05-22 PROCEDURE — 93005 ELECTROCARDIOGRAM TRACING: CPT

## 2021-05-22 PROCEDURE — 84484 ASSAY OF TROPONIN QUANT: CPT | Performed by: STUDENT IN AN ORGANIZED HEALTH CARE EDUCATION/TRAINING PROGRAM

## 2021-05-22 PROCEDURE — 93010 ELECTROCARDIOGRAM REPORT: CPT | Mod: ,,, | Performed by: INTERNAL MEDICINE

## 2021-05-22 PROCEDURE — 80069 RENAL FUNCTION PANEL: CPT | Performed by: STUDENT IN AN ORGANIZED HEALTH CARE EDUCATION/TRAINING PROGRAM

## 2021-05-22 PROCEDURE — 27000221 HC OXYGEN, UP TO 24 HOURS

## 2021-05-22 PROCEDURE — 99291 CRITICAL CARE FIRST HOUR: CPT | Mod: GC,,, | Performed by: INTERNAL MEDICINE

## 2021-05-22 PROCEDURE — 25000003 PHARM REV CODE 250: Performed by: INTERNAL MEDICINE

## 2021-05-22 PROCEDURE — 84100 ASSAY OF PHOSPHORUS: CPT | Performed by: INTERNAL MEDICINE

## 2021-05-22 PROCEDURE — 99291 PR CRITICAL CARE, E/M 30-74 MINUTES: ICD-10-PCS | Mod: GC,,, | Performed by: INTERNAL MEDICINE

## 2021-05-22 PROCEDURE — 99233 SBSQ HOSP IP/OBS HIGH 50: CPT | Mod: GC,,, | Performed by: INTERNAL MEDICINE

## 2021-05-22 RX ORDER — MAGNESIUM SULFATE 1 G/100ML
1 INJECTION INTRAVENOUS ONCE
Status: COMPLETED | OUTPATIENT
Start: 2021-05-22 | End: 2021-05-22

## 2021-05-22 RX ORDER — HYDRALAZINE HYDROCHLORIDE 20 MG/ML
10 INJECTION INTRAMUSCULAR; INTRAVENOUS EVERY 8 HOURS PRN
Status: DISCONTINUED | OUTPATIENT
Start: 2021-05-23 | End: 2021-05-23

## 2021-05-22 RX ORDER — SODIUM CHLORIDE 9 MG/ML
INJECTION, SOLUTION INTRAVENOUS CONTINUOUS
Status: DISCONTINUED | OUTPATIENT
Start: 2021-05-22 | End: 2021-06-02 | Stop reason: HOSPADM

## 2021-05-22 RX ORDER — SUMATRIPTAN 50 MG/1
100 TABLET, FILM COATED ORAL 2 TIMES DAILY PRN
Status: COMPLETED | OUTPATIENT
Start: 2021-05-22 | End: 2021-05-22

## 2021-05-22 RX ORDER — POTASSIUM CHLORIDE 20 MEQ/1
40 TABLET, EXTENDED RELEASE ORAL ONCE
Status: COMPLETED | OUTPATIENT
Start: 2021-05-22 | End: 2021-05-22

## 2021-05-22 RX ORDER — NIFEDIPINE 30 MG/1
30 TABLET, EXTENDED RELEASE ORAL DAILY
Status: DISCONTINUED | OUTPATIENT
Start: 2021-05-24 | End: 2021-05-23

## 2021-05-22 RX ORDER — HYDRALAZINE HYDROCHLORIDE 25 MG/1
25 TABLET, FILM COATED ORAL EVERY 8 HOURS
Status: DISCONTINUED | OUTPATIENT
Start: 2021-05-22 | End: 2021-06-02 | Stop reason: HOSPADM

## 2021-05-22 RX ORDER — CEFTRIAXONE 1 G/1
1 INJECTION, POWDER, FOR SOLUTION INTRAMUSCULAR; INTRAVENOUS
Status: COMPLETED | OUTPATIENT
Start: 2021-05-22 | End: 2021-05-23

## 2021-05-22 RX ORDER — NIFEDIPINE 30 MG/1
30 TABLET, EXTENDED RELEASE ORAL ONCE
Status: DISCONTINUED | OUTPATIENT
Start: 2021-05-23 | End: 2021-05-23

## 2021-05-22 RX ORDER — SUMATRIPTAN 50 MG/1
100 TABLET, FILM COATED ORAL 2 TIMES DAILY PRN
Status: DISCONTINUED | OUTPATIENT
Start: 2021-05-22 | End: 2021-05-22

## 2021-05-22 RX ADMIN — SODIUM BICARBONATE 650 MG TABLET 1300 MG: at 09:05

## 2021-05-22 RX ADMIN — HYDROCODONE BITARTRATE AND ACETAMINOPHEN 1 TABLET: 10; 325 TABLET ORAL at 07:05

## 2021-05-22 RX ADMIN — POTASSIUM CHLORIDE 40 MEQ: 1500 TABLET, EXTENDED RELEASE ORAL at 08:05

## 2021-05-22 RX ADMIN — METHOCARBAMOL 1000 MG: 500 TABLET ORAL at 04:05

## 2021-05-22 RX ADMIN — HYDROCODONE BITARTRATE AND ACETAMINOPHEN 1 TABLET: 10; 325 TABLET ORAL at 04:05

## 2021-05-22 RX ADMIN — HEPARIN SODIUM 5000 UNITS: 5000 INJECTION INTRAVENOUS; SUBCUTANEOUS at 06:05

## 2021-05-22 RX ADMIN — SODIUM CHLORIDE 10 ML/HR: 0.9 INJECTION, SOLUTION INTRAVENOUS at 03:05

## 2021-05-22 RX ADMIN — INSULIN DETEMIR 15 UNITS: 100 INJECTION, SOLUTION SUBCUTANEOUS at 10:05

## 2021-05-22 RX ADMIN — LEVOTHYROXINE SODIUM 50 MCG: 50 TABLET ORAL at 06:05

## 2021-05-22 RX ADMIN — CLONIDINE HYDROCHLORIDE 0.3 MG: 0.1 TABLET ORAL at 07:05

## 2021-05-22 RX ADMIN — SODIUM BICARBONATE 650 MG TABLET 1300 MG: at 04:05

## 2021-05-22 RX ADMIN — METHOCARBAMOL 1000 MG: 500 TABLET ORAL at 07:05

## 2021-05-22 RX ADMIN — HYDRALAZINE HYDROCHLORIDE 25 MG: 25 TABLET, FILM COATED ORAL at 01:05

## 2021-05-22 RX ADMIN — SODIUM BICARBONATE 650 MG TABLET 1300 MG: at 08:05

## 2021-05-22 RX ADMIN — INSULIN ASPART 1 UNITS: 100 INJECTION, SOLUTION INTRAVENOUS; SUBCUTANEOUS at 10:05

## 2021-05-22 RX ADMIN — CALCIUM GLUCONATE 2 G: 98 INJECTION, SOLUTION INTRAVENOUS at 08:05

## 2021-05-22 RX ADMIN — CETIRIZINE HYDROCHLORIDE 5 MG: 5 TABLET ORAL at 08:05

## 2021-05-22 RX ADMIN — HEPARIN SODIUM 5000 UNITS: 5000 INJECTION INTRAVENOUS; SUBCUTANEOUS at 01:05

## 2021-05-22 RX ADMIN — HYDRALAZINE HYDROCHLORIDE 25 MG: 25 TABLET, FILM COATED ORAL at 09:05

## 2021-05-22 RX ADMIN — ROSUVASTATIN CALCIUM 20 MG: 20 TABLET, FILM COATED ORAL at 09:05

## 2021-05-22 RX ADMIN — VENLAFAXINE HYDROCHLORIDE 150 MG: 150 CAPSULE, EXTENDED RELEASE ORAL at 09:05

## 2021-05-22 RX ADMIN — CEFTRIAXONE 1 G: 1 INJECTION, POWDER, FOR SOLUTION INTRAMUSCULAR; INTRAVENOUS at 06:05

## 2021-05-22 RX ADMIN — HEPARIN SODIUM 5000 UNITS: 5000 INJECTION INTRAVENOUS; SUBCUTANEOUS at 09:05

## 2021-05-22 RX ADMIN — SUMATRIPTAN SUCCINATE 100 MG: 50 TABLET ORAL at 11:05

## 2021-05-22 RX ADMIN — AZELASTINE HYDROCHLORIDE 137 MCG: 137 SPRAY, METERED NASAL at 09:05

## 2021-05-22 RX ADMIN — MUPIROCIN: 20 OINTMENT TOPICAL at 09:05

## 2021-05-22 RX ADMIN — MAGNESIUM SULFATE HEPTAHYDRATE 1 G: 500 INJECTION, SOLUTION INTRAMUSCULAR; INTRAVENOUS at 03:05

## 2021-05-22 RX ADMIN — OXYBUTYNIN CHLORIDE 10 MG: 10 TABLET, EXTENDED RELEASE ORAL at 09:05

## 2021-05-22 RX ADMIN — CLONIDINE HYDROCHLORIDE 0.3 MG: 0.1 TABLET ORAL at 04:05

## 2021-05-22 RX ADMIN — AMLODIPINE BESYLATE 10 MG: 10 TABLET ORAL at 07:05

## 2021-05-22 RX ADMIN — CLONIDINE HYDROCHLORIDE 0.3 MG: 0.1 TABLET ORAL at 08:05

## 2021-05-23 LAB
ALBUMIN SERPL BCP-MCNC: 1.8 G/DL (ref 3.5–5.2)
ANION GAP SERPL CALC-SCNC: 12 MMOL/L (ref 8–16)
ANION GAP SERPL CALC-SCNC: 12 MMOL/L (ref 8–16)
ANION GAP SERPL CALC-SCNC: 13 MMOL/L (ref 8–16)
ANION GAP SERPL CALC-SCNC: 13 MMOL/L (ref 8–16)
BASOPHILS # BLD AUTO: 0.05 K/UL (ref 0–0.2)
BASOPHILS NFR BLD: 0.4 % (ref 0–1.9)
BUN SERPL-MCNC: 49 MG/DL (ref 8–23)
BUN SERPL-MCNC: 49 MG/DL (ref 8–23)
BUN SERPL-MCNC: 50 MG/DL (ref 8–23)
BUN SERPL-MCNC: 50 MG/DL (ref 8–23)
CALCIUM SERPL-MCNC: 7.2 MG/DL (ref 8.7–10.5)
CALCIUM SERPL-MCNC: 7.4 MG/DL (ref 8.7–10.5)
CALCIUM SERPL-MCNC: 7.6 MG/DL (ref 8.7–10.5)
CALCIUM SERPL-MCNC: 7.6 MG/DL (ref 8.7–10.5)
CHLORIDE SERPL-SCNC: 82 MMOL/L (ref 95–110)
CHLORIDE SERPL-SCNC: 83 MMOL/L (ref 95–110)
CHLORIDE SERPL-SCNC: 83 MMOL/L (ref 95–110)
CHLORIDE SERPL-SCNC: 85 MMOL/L (ref 95–110)
CO2 SERPL-SCNC: 24 MMOL/L (ref 23–29)
CO2 SERPL-SCNC: 24 MMOL/L (ref 23–29)
CO2 SERPL-SCNC: 25 MMOL/L (ref 23–29)
CO2 SERPL-SCNC: 25 MMOL/L (ref 23–29)
CREAT SERPL-MCNC: 4.8 MG/DL (ref 0.5–1.4)
CREAT SERPL-MCNC: 4.9 MG/DL (ref 0.5–1.4)
DIFFERENTIAL METHOD: ABNORMAL
EOSINOPHIL # BLD AUTO: 0.2 K/UL (ref 0–0.5)
EOSINOPHIL NFR BLD: 1.4 % (ref 0–8)
ERYTHROCYTE [DISTWIDTH] IN BLOOD BY AUTOMATED COUNT: 12.6 % (ref 11.5–14.5)
EST. GFR  (AFRICAN AMERICAN): 10 ML/MIN/1.73 M^2
EST. GFR  (AFRICAN AMERICAN): 9.8 ML/MIN/1.73 M^2
EST. GFR  (NON AFRICAN AMERICAN): 8.5 ML/MIN/1.73 M^2
EST. GFR  (NON AFRICAN AMERICAN): 8.7 ML/MIN/1.73 M^2
GLUCOSE SERPL-MCNC: 110 MG/DL (ref 70–110)
GLUCOSE SERPL-MCNC: 110 MG/DL (ref 70–110)
GLUCOSE SERPL-MCNC: 139 MG/DL (ref 70–110)
GLUCOSE SERPL-MCNC: 160 MG/DL (ref 70–110)
HCT VFR BLD AUTO: 27.3 % (ref 37–48.5)
HGB BLD-MCNC: 9.6 G/DL (ref 12–16)
IMM GRANULOCYTES # BLD AUTO: 0.48 K/UL (ref 0–0.04)
IMM GRANULOCYTES NFR BLD AUTO: 3.4 % (ref 0–0.5)
LYMPHOCYTES # BLD AUTO: 2.4 K/UL (ref 1–4.8)
LYMPHOCYTES NFR BLD: 17.1 % (ref 18–48)
MAGNESIUM SERPL-MCNC: 1.8 MG/DL (ref 1.6–2.6)
MCH RBC QN AUTO: 27.8 PG (ref 27–31)
MCHC RBC AUTO-ENTMCNC: 35.2 G/DL (ref 32–36)
MCV RBC AUTO: 79 FL (ref 82–98)
MONOCYTES # BLD AUTO: 1.1 K/UL (ref 0.3–1)
MONOCYTES NFR BLD: 7.5 % (ref 4–15)
NEUTROPHILS # BLD AUTO: 9.8 K/UL (ref 1.8–7.7)
NEUTROPHILS NFR BLD: 70.2 % (ref 38–73)
NRBC BLD-RTO: 0 /100 WBC
PHOSPHATE SERPL-MCNC: 6.4 MG/DL (ref 2.7–4.5)
PHOSPHATE SERPL-MCNC: 6.4 MG/DL (ref 2.7–4.5)
PLATELET # BLD AUTO: 269 K/UL (ref 150–450)
PMV BLD AUTO: 9.5 FL (ref 9.2–12.9)
POCT GLUCOSE: 125 MG/DL (ref 70–110)
POCT GLUCOSE: 180 MG/DL (ref 70–110)
POCT GLUCOSE: 282 MG/DL (ref 70–110)
POTASSIUM SERPL-SCNC: 4.2 MMOL/L (ref 3.5–5.1)
POTASSIUM SERPL-SCNC: 4.2 MMOL/L (ref 3.5–5.1)
POTASSIUM SERPL-SCNC: 4.4 MMOL/L (ref 3.5–5.1)
POTASSIUM SERPL-SCNC: 4.5 MMOL/L (ref 3.5–5.1)
RBC # BLD AUTO: 3.45 M/UL (ref 4–5.4)
SODIUM SERPL-SCNC: 119 MMOL/L (ref 136–145)
SODIUM SERPL-SCNC: 120 MMOL/L (ref 136–145)
SODIUM SERPL-SCNC: 120 MMOL/L (ref 136–145)
SODIUM SERPL-SCNC: 122 MMOL/L (ref 136–145)
TROPONIN I SERPL DL<=0.01 NG/ML-MCNC: 0.09 NG/ML (ref 0–0.03)
TROPONIN I SERPL DL<=0.01 NG/ML-MCNC: 0.11 NG/ML (ref 0–0.03)
WBC # BLD AUTO: 14 K/UL (ref 3.9–12.7)

## 2021-05-23 PROCEDURE — 25000003 PHARM REV CODE 250: Performed by: HOSPITALIST

## 2021-05-23 PROCEDURE — 63600175 PHARM REV CODE 636 W HCPCS: Performed by: STUDENT IN AN ORGANIZED HEALTH CARE EDUCATION/TRAINING PROGRAM

## 2021-05-23 PROCEDURE — 85025 COMPLETE CBC W/AUTO DIFF WBC: CPT | Performed by: INTERNAL MEDICINE

## 2021-05-23 PROCEDURE — 84484 ASSAY OF TROPONIN QUANT: CPT | Performed by: INTERNAL MEDICINE

## 2021-05-23 PROCEDURE — 80048 BASIC METABOLIC PNL TOTAL CA: CPT | Performed by: STUDENT IN AN ORGANIZED HEALTH CARE EDUCATION/TRAINING PROGRAM

## 2021-05-23 PROCEDURE — 11000001 HC ACUTE MED/SURG PRIVATE ROOM

## 2021-05-23 PROCEDURE — 63600175 PHARM REV CODE 636 W HCPCS: Performed by: INTERNAL MEDICINE

## 2021-05-23 PROCEDURE — 99232 SBSQ HOSP IP/OBS MODERATE 35: CPT | Mod: GC,,, | Performed by: INTERNAL MEDICINE

## 2021-05-23 PROCEDURE — 80069 RENAL FUNCTION PANEL: CPT | Performed by: INTERNAL MEDICINE

## 2021-05-23 PROCEDURE — 99232 PR SUBSEQUENT HOSPITAL CARE,LEVL II: ICD-10-PCS | Mod: GC,,, | Performed by: INTERNAL MEDICINE

## 2021-05-23 PROCEDURE — 25000003 PHARM REV CODE 250: Performed by: STUDENT IN AN ORGANIZED HEALTH CARE EDUCATION/TRAINING PROGRAM

## 2021-05-23 PROCEDURE — 83735 ASSAY OF MAGNESIUM: CPT | Performed by: INTERNAL MEDICINE

## 2021-05-23 PROCEDURE — 99233 PR SUBSEQUENT HOSPITAL CARE,LEVL III: ICD-10-PCS | Mod: 25,GC,, | Performed by: INTERNAL MEDICINE

## 2021-05-23 PROCEDURE — 99233 SBSQ HOSP IP/OBS HIGH 50: CPT | Mod: 25,GC,, | Performed by: INTERNAL MEDICINE

## 2021-05-23 RX ORDER — NIFEDIPINE 30 MG/1
30 TABLET, EXTENDED RELEASE ORAL DAILY
Status: DISCONTINUED | OUTPATIENT
Start: 2021-05-23 | End: 2021-05-23

## 2021-05-23 RX ORDER — NIFEDIPINE 30 MG/1
30 TABLET, EXTENDED RELEASE ORAL DAILY
Status: DISCONTINUED | OUTPATIENT
Start: 2021-05-23 | End: 2021-05-30

## 2021-05-23 RX ADMIN — CLONIDINE HYDROCHLORIDE 0.3 MG: 0.1 TABLET ORAL at 02:05

## 2021-05-23 RX ADMIN — INSULIN ASPART 1 UNITS: 100 INJECTION, SOLUTION INTRAVENOUS; SUBCUTANEOUS at 08:05

## 2021-05-23 RX ADMIN — CEFTRIAXONE 1 G: 1 INJECTION, POWDER, FOR SOLUTION INTRAMUSCULAR; INTRAVENOUS at 05:05

## 2021-05-23 RX ADMIN — HEPARIN SODIUM 5000 UNITS: 5000 INJECTION INTRAVENOUS; SUBCUTANEOUS at 02:05

## 2021-05-23 RX ADMIN — INSULIN DETEMIR 15 UNITS: 100 INJECTION, SOLUTION SUBCUTANEOUS at 08:05

## 2021-05-23 RX ADMIN — HEPARIN SODIUM 5000 UNITS: 5000 INJECTION INTRAVENOUS; SUBCUTANEOUS at 06:05

## 2021-05-23 RX ADMIN — NIFEDIPINE 30 MG: 30 TABLET, FILM COATED, EXTENDED RELEASE ORAL at 04:05

## 2021-05-23 RX ADMIN — HYDRALAZINE HYDROCHLORIDE 25 MG: 25 TABLET, FILM COATED ORAL at 02:05

## 2021-05-23 RX ADMIN — LEVOTHYROXINE SODIUM 50 MCG: 50 TABLET ORAL at 06:05

## 2021-05-23 RX ADMIN — CLONIDINE HYDROCHLORIDE 0.3 MG: 0.1 TABLET ORAL at 09:05

## 2021-05-23 RX ADMIN — METHOCARBAMOL 1000 MG: 500 TABLET ORAL at 08:05

## 2021-05-23 RX ADMIN — HYDRALAZINE HYDROCHLORIDE 10 MG: 20 INJECTION INTRAMUSCULAR; INTRAVENOUS at 12:05

## 2021-05-23 RX ADMIN — ROSUVASTATIN CALCIUM 20 MG: 20 TABLET, FILM COATED ORAL at 09:05

## 2021-05-23 RX ADMIN — VENLAFAXINE HYDROCHLORIDE 150 MG: 150 CAPSULE, EXTENDED RELEASE ORAL at 09:05

## 2021-05-23 RX ADMIN — HYDROCODONE BITARTRATE AND ACETAMINOPHEN 1 TABLET: 10; 325 TABLET ORAL at 01:05

## 2021-05-23 RX ADMIN — SODIUM BICARBONATE 650 MG TABLET 1300 MG: at 02:05

## 2021-05-23 RX ADMIN — AZELASTINE HYDROCHLORIDE 137 MCG: 137 SPRAY, METERED NASAL at 08:05

## 2021-05-23 RX ADMIN — HYDROCODONE BITARTRATE AND ACETAMINOPHEN 1 TABLET: 10; 325 TABLET ORAL at 08:05

## 2021-05-23 RX ADMIN — SODIUM BICARBONATE 650 MG TABLET 1300 MG: at 08:05

## 2021-05-23 RX ADMIN — METHOCARBAMOL 1000 MG: 500 TABLET ORAL at 09:05

## 2021-05-23 RX ADMIN — HEPARIN SODIUM 5000 UNITS: 5000 INJECTION INTRAVENOUS; SUBCUTANEOUS at 11:05

## 2021-05-23 RX ADMIN — HYDRALAZINE HYDROCHLORIDE 25 MG: 25 TABLET, FILM COATED ORAL at 06:05

## 2021-05-23 RX ADMIN — OXYBUTYNIN CHLORIDE 10 MG: 10 TABLET, EXTENDED RELEASE ORAL at 09:05

## 2021-05-23 RX ADMIN — PROPRANOLOL HYDROCHLORIDE 80 MG: 80 CAPSULE, EXTENDED RELEASE ORAL at 09:05

## 2021-05-23 RX ADMIN — CLONIDINE HYDROCHLORIDE 0.3 MG: 0.1 TABLET ORAL at 08:05

## 2021-05-23 RX ADMIN — AZELASTINE HYDROCHLORIDE 137 MCG: 137 SPRAY, METERED NASAL at 09:05

## 2021-05-23 RX ADMIN — HYDRALAZINE HYDROCHLORIDE 25 MG: 25 TABLET, FILM COATED ORAL at 10:05

## 2021-05-23 RX ADMIN — CETIRIZINE HYDROCHLORIDE 5 MG: 5 TABLET ORAL at 08:05

## 2021-05-23 RX ADMIN — HYDROCODONE BITARTRATE AND ACETAMINOPHEN 1 TABLET: 10; 325 TABLET ORAL at 09:05

## 2021-05-23 RX ADMIN — HYDROCODONE BITARTRATE AND ACETAMINOPHEN 1 TABLET: 10; 325 TABLET ORAL at 03:05

## 2021-05-23 RX ADMIN — SODIUM BICARBONATE 650 MG TABLET 1300 MG: at 09:05

## 2021-05-24 ENCOUNTER — TELEPHONE (OUTPATIENT)
Dept: NEPHROLOGY | Facility: CLINIC | Age: 71
End: 2021-05-24

## 2021-05-24 LAB
ALBUMIN SERPL BCP-MCNC: 1.9 G/DL (ref 3.5–5.2)
ANION GAP SERPL CALC-SCNC: 13 MMOL/L (ref 8–16)
ANION GAP SERPL CALC-SCNC: 13 MMOL/L (ref 8–16)
ANION GAP SERPL CALC-SCNC: 14 MMOL/L (ref 8–16)
ANION GAP SERPL CALC-SCNC: 14 MMOL/L (ref 8–16)
ANION GAP SERPL CALC-SCNC: 15 MMOL/L (ref 8–16)
ASCENDING AORTA: 3.49 CM
AV INDEX (PROSTH): 0.74
AV MEAN GRADIENT: 7 MMHG
AV PEAK GRADIENT: 11 MMHG
AV VALVE AREA: 2.72 CM2
AV VELOCITY RATIO: 0.76
BASOPHILS # BLD AUTO: 0.06 K/UL (ref 0–0.2)
BASOPHILS NFR BLD: 0.4 % (ref 0–1.9)
BSA FOR ECHO PROCEDURE: 2.48 M2
BUN SERPL-MCNC: 51 MG/DL (ref 8–23)
BUN SERPL-MCNC: 52 MG/DL (ref 8–23)
BUN SERPL-MCNC: 53 MG/DL (ref 8–23)
BUN SERPL-MCNC: 53 MG/DL (ref 8–23)
BUN SERPL-MCNC: 54 MG/DL (ref 8–23)
CALCIUM SERPL-MCNC: 7.5 MG/DL (ref 8.7–10.5)
CALCIUM SERPL-MCNC: 7.5 MG/DL (ref 8.7–10.5)
CALCIUM SERPL-MCNC: 7.6 MG/DL (ref 8.7–10.5)
CALCIUM SERPL-MCNC: 7.6 MG/DL (ref 8.7–10.5)
CALCIUM SERPL-MCNC: 7.7 MG/DL (ref 8.7–10.5)
CHLORIDE SERPL-SCNC: 82 MMOL/L (ref 95–110)
CHLORIDE SERPL-SCNC: 84 MMOL/L (ref 95–110)
CHLORIDE SERPL-SCNC: 85 MMOL/L (ref 95–110)
CO2 SERPL-SCNC: 24 MMOL/L (ref 23–29)
CO2 SERPL-SCNC: 25 MMOL/L (ref 23–29)
CREAT SERPL-MCNC: 4.9 MG/DL (ref 0.5–1.4)
CREAT SERPL-MCNC: 4.9 MG/DL (ref 0.5–1.4)
CREAT SERPL-MCNC: 5.1 MG/DL (ref 0.5–1.4)
CV ECHO LV RWT: 0.51 CM
DIFFERENTIAL METHOD: ABNORMAL
DOP CALC AO PEAK VEL: 1.66 M/S
DOP CALC AO VTI: 40.87 CM
DOP CALC LVOT AREA: 3.7 CM2
DOP CALC LVOT DIAMETER: 2.16 CM
DOP CALC LVOT PEAK VEL: 1.26 M/S
DOP CALC LVOT STROKE VOLUME: 111.12 CM3
DOP CALCLVOT PEAK VEL VTI: 30.34 CM
E WAVE DECELERATION TIME: 269.74 MSEC
E/A RATIO: 0.76
E/E' RATIO: 14 M/S
ECHO LV POSTERIOR WALL: 1.05 CM (ref 0.6–1.1)
EJECTION FRACTION: 60 %
EOSINOPHIL # BLD AUTO: 0.2 K/UL (ref 0–0.5)
EOSINOPHIL NFR BLD: 1.6 % (ref 0–8)
ERYTHROCYTE [DISTWIDTH] IN BLOOD BY AUTOMATED COUNT: 12.8 % (ref 11.5–14.5)
EST. GFR  (AFRICAN AMERICAN): 9.3 ML/MIN/1.73 M^2
EST. GFR  (AFRICAN AMERICAN): 9.8 ML/MIN/1.73 M^2
EST. GFR  (AFRICAN AMERICAN): 9.8 ML/MIN/1.73 M^2
EST. GFR  (NON AFRICAN AMERICAN): 8.1 ML/MIN/1.73 M^2
EST. GFR  (NON AFRICAN AMERICAN): 8.5 ML/MIN/1.73 M^2
EST. GFR  (NON AFRICAN AMERICAN): 8.5 ML/MIN/1.73 M^2
FRACTIONAL SHORTENING: 25 % (ref 28–44)
GLUCOSE SERPL-MCNC: 154 MG/DL (ref 70–110)
GLUCOSE SERPL-MCNC: 155 MG/DL (ref 70–110)
GLUCOSE SERPL-MCNC: 155 MG/DL (ref 70–110)
GLUCOSE SERPL-MCNC: 157 MG/DL (ref 70–110)
GLUCOSE SERPL-MCNC: 204 MG/DL (ref 70–110)
HCT VFR BLD AUTO: 27.9 % (ref 37–48.5)
HGB BLD-MCNC: 9.7 G/DL (ref 12–16)
IMM GRANULOCYTES # BLD AUTO: 0.45 K/UL (ref 0–0.04)
IMM GRANULOCYTES NFR BLD AUTO: 3.2 % (ref 0–0.5)
INTERVENTRICULAR SEPTUM: 0.99 CM (ref 0.6–1.1)
LA MAJOR: 4.59 CM
LA MINOR: 4.59 CM
LA WIDTH: 3.52 CM
LEFT ATRIUM SIZE: 3.06 CM
LEFT ATRIUM VOLUME INDEX MOD: 17.7 ML/M2
LEFT ATRIUM VOLUME INDEX: 17.7 ML/M2
LEFT ATRIUM VOLUME MOD: 41.89 CM3
LEFT ATRIUM VOLUME: 42.02 CM3
LEFT INTERNAL DIMENSION IN SYSTOLE: 3.05 CM (ref 2.1–4)
LEFT VENTRICLE DIASTOLIC VOLUME INDEX: 31.17 ML/M2
LEFT VENTRICLE DIASTOLIC VOLUME: 73.87 ML
LEFT VENTRICLE MASS INDEX: 57 G/M2
LEFT VENTRICLE SYSTOLIC VOLUME INDEX: 15.4 ML/M2
LEFT VENTRICLE SYSTOLIC VOLUME: 36.55 ML
LEFT VENTRICULAR INTERNAL DIMENSION IN DIASTOLE: 4.09 CM (ref 3.5–6)
LEFT VENTRICULAR MASS: 135.32 G
LV LATERAL E/E' RATIO: 19.25 M/S
LV SEPTAL E/E' RATIO: 11 M/S
LYMPHOCYTES # BLD AUTO: 2.7 K/UL (ref 1–4.8)
LYMPHOCYTES NFR BLD: 19.4 % (ref 18–48)
MAGNESIUM SERPL-MCNC: 1.9 MG/DL (ref 1.6–2.6)
MCH RBC QN AUTO: 28.2 PG (ref 27–31)
MCHC RBC AUTO-ENTMCNC: 34.8 G/DL (ref 32–36)
MCV RBC AUTO: 81 FL (ref 82–98)
MONOCYTES # BLD AUTO: 1.3 K/UL (ref 0.3–1)
MONOCYTES NFR BLD: 9 % (ref 4–15)
MV A" WAVE DURATION": 14.84 MSEC
MV PEAK A VEL: 1.01 M/S
MV PEAK E VEL: 0.77 M/S
MV STENOSIS PRESSURE HALF TIME: 78.23 MS
MV VALVE AREA P 1/2 METHOD: 2.81 CM2
NEUTROPHILS # BLD AUTO: 9.3 K/UL (ref 1.8–7.7)
NEUTROPHILS NFR BLD: 66.4 % (ref 38–73)
NRBC BLD-RTO: 0 /100 WBC
PHOSPHATE SERPL-MCNC: 7 MG/DL (ref 2.7–4.5)
PHOSPHATE SERPL-MCNC: 7.1 MG/DL (ref 2.7–4.5)
PISA TR MAX VEL: 2.58 M/S
PLATELET # BLD AUTO: 272 K/UL (ref 150–450)
PMV BLD AUTO: 9.8 FL (ref 9.2–12.9)
POCT GLUCOSE: 162 MG/DL (ref 70–110)
POCT GLUCOSE: 263 MG/DL (ref 70–110)
POTASSIUM SERPL-SCNC: 4.6 MMOL/L (ref 3.5–5.1)
POTASSIUM SERPL-SCNC: 4.7 MMOL/L (ref 3.5–5.1)
POTASSIUM SERPL-SCNC: 4.8 MMOL/L (ref 3.5–5.1)
POTASSIUM SERPL-SCNC: 4.9 MMOL/L (ref 3.5–5.1)
POTASSIUM SERPL-SCNC: 5.1 MMOL/L (ref 3.5–5.1)
PULM VEIN S/D RATIO: 1.92
PV PEAK D VEL: 0.26 M/S
PV PEAK S VEL: 0.5 M/S
RA MAJOR: 3.62 CM
RA WIDTH: 2.98 CM
RBC # BLD AUTO: 3.44 M/UL (ref 4–5.4)
RIGHT VENTRICULAR END-DIASTOLIC DIMENSION: 2.85 CM
RV TISSUE DOPPLER FREE WALL SYSTOLIC VELOCITY 1 (APICAL 4 CHAMBER VIEW): 7.78 CM/S
SINUS: 3.29 CM
SODIUM SERPL-SCNC: 120 MMOL/L (ref 136–145)
SODIUM SERPL-SCNC: 121 MMOL/L (ref 136–145)
SODIUM SERPL-SCNC: 122 MMOL/L (ref 136–145)
SODIUM SERPL-SCNC: 123 MMOL/L (ref 136–145)
SODIUM SERPL-SCNC: 123 MMOL/L (ref 136–145)
STJ: 3.29 CM
TDI LATERAL: 0.04 M/S
TDI SEPTAL: 0.07 M/S
TDI: 0.06 M/S
TR MAX PG: 27 MMHG
TRICUSPID ANNULAR PLANE SYSTOLIC EXCURSION: 2.11 CM
WBC # BLD AUTO: 13.95 K/UL (ref 3.9–12.7)

## 2021-05-24 PROCEDURE — 99232 PR SUBSEQUENT HOSPITAL CARE,LEVL II: ICD-10-PCS | Mod: GC,,, | Performed by: INTERNAL MEDICINE

## 2021-05-24 PROCEDURE — 25000003 PHARM REV CODE 250: Performed by: HOSPITALIST

## 2021-05-24 PROCEDURE — 99232 SBSQ HOSP IP/OBS MODERATE 35: CPT | Mod: GC,,, | Performed by: INTERNAL MEDICINE

## 2021-05-24 PROCEDURE — 80048 BASIC METABOLIC PNL TOTAL CA: CPT | Mod: 91 | Performed by: STUDENT IN AN ORGANIZED HEALTH CARE EDUCATION/TRAINING PROGRAM

## 2021-05-24 PROCEDURE — 97168 OT RE-EVAL EST PLAN CARE: CPT

## 2021-05-24 PROCEDURE — 97164 PT RE-EVAL EST PLAN CARE: CPT

## 2021-05-24 PROCEDURE — 84100 ASSAY OF PHOSPHORUS: CPT | Performed by: STUDENT IN AN ORGANIZED HEALTH CARE EDUCATION/TRAINING PROGRAM

## 2021-05-24 PROCEDURE — 99232 PR SUBSEQUENT HOSPITAL CARE,LEVL II: ICD-10-PCS | Mod: ,,, | Performed by: INTERNAL MEDICINE

## 2021-05-24 PROCEDURE — 97116 GAIT TRAINING THERAPY: CPT

## 2021-05-24 PROCEDURE — 25000003 PHARM REV CODE 250: Performed by: STUDENT IN AN ORGANIZED HEALTH CARE EDUCATION/TRAINING PROGRAM

## 2021-05-24 PROCEDURE — 11000001 HC ACUTE MED/SURG PRIVATE ROOM

## 2021-05-24 PROCEDURE — 97530 THERAPEUTIC ACTIVITIES: CPT

## 2021-05-24 PROCEDURE — 85025 COMPLETE CBC W/AUTO DIFF WBC: CPT | Performed by: STUDENT IN AN ORGANIZED HEALTH CARE EDUCATION/TRAINING PROGRAM

## 2021-05-24 PROCEDURE — 36415 COLL VENOUS BLD VENIPUNCTURE: CPT | Performed by: STUDENT IN AN ORGANIZED HEALTH CARE EDUCATION/TRAINING PROGRAM

## 2021-05-24 PROCEDURE — 83735 ASSAY OF MAGNESIUM: CPT | Performed by: STUDENT IN AN ORGANIZED HEALTH CARE EDUCATION/TRAINING PROGRAM

## 2021-05-24 PROCEDURE — 63600175 PHARM REV CODE 636 W HCPCS: Performed by: INTERNAL MEDICINE

## 2021-05-24 PROCEDURE — 99232 SBSQ HOSP IP/OBS MODERATE 35: CPT | Mod: ,,, | Performed by: INTERNAL MEDICINE

## 2021-05-24 PROCEDURE — 80048 BASIC METABOLIC PNL TOTAL CA: CPT | Performed by: STUDENT IN AN ORGANIZED HEALTH CARE EDUCATION/TRAINING PROGRAM

## 2021-05-24 PROCEDURE — 80069 RENAL FUNCTION PANEL: CPT | Performed by: STUDENT IN AN ORGANIZED HEALTH CARE EDUCATION/TRAINING PROGRAM

## 2021-05-24 RX ORDER — SUMATRIPTAN 50 MG/1
100 TABLET, FILM COATED ORAL ONCE
Status: COMPLETED | OUTPATIENT
Start: 2021-05-24 | End: 2021-05-24

## 2021-05-24 RX ORDER — ATORVASTATIN CALCIUM 20 MG/1
40 TABLET, FILM COATED ORAL DAILY
Status: DISCONTINUED | OUTPATIENT
Start: 2021-05-25 | End: 2021-06-02 | Stop reason: HOSPADM

## 2021-05-24 RX ADMIN — HYDRALAZINE HYDROCHLORIDE 25 MG: 25 TABLET, FILM COATED ORAL at 02:05

## 2021-05-24 RX ADMIN — METHOCARBAMOL 1000 MG: 500 TABLET ORAL at 05:05

## 2021-05-24 RX ADMIN — ROSUVASTATIN CALCIUM 20 MG: 20 TABLET, FILM COATED ORAL at 12:05

## 2021-05-24 RX ADMIN — AZELASTINE HYDROCHLORIDE 137 MCG: 137 SPRAY, METERED NASAL at 08:05

## 2021-05-24 RX ADMIN — HYDROCODONE BITARTRATE AND ACETAMINOPHEN 1 TABLET: 10; 325 TABLET ORAL at 05:05

## 2021-05-24 RX ADMIN — INSULIN DETEMIR 15 UNITS: 100 INJECTION, SOLUTION SUBCUTANEOUS at 08:05

## 2021-05-24 RX ADMIN — SODIUM BICARBONATE 650 MG TABLET 1300 MG: at 08:05

## 2021-05-24 RX ADMIN — HYDROCODONE BITARTRATE AND ACETAMINOPHEN 1 TABLET: 10; 325 TABLET ORAL at 12:05

## 2021-05-24 RX ADMIN — METHOCARBAMOL 1000 MG: 500 TABLET ORAL at 12:05

## 2021-05-24 RX ADMIN — HEPARIN SODIUM 5000 UNITS: 5000 INJECTION INTRAVENOUS; SUBCUTANEOUS at 09:05

## 2021-05-24 RX ADMIN — LEVOTHYROXINE SODIUM 50 MCG: 50 TABLET ORAL at 05:05

## 2021-05-24 RX ADMIN — SUMATRIPTAN SUCCINATE 100 MG: 50 TABLET ORAL at 01:05

## 2021-05-24 RX ADMIN — HEPARIN SODIUM 5000 UNITS: 5000 INJECTION INTRAVENOUS; SUBCUTANEOUS at 05:05

## 2021-05-24 RX ADMIN — SODIUM BICARBONATE 650 MG TABLET 1300 MG: at 02:05

## 2021-05-24 RX ADMIN — INSULIN ASPART 1 UNITS: 100 INJECTION, SOLUTION INTRAVENOUS; SUBCUTANEOUS at 08:05

## 2021-05-24 RX ADMIN — HYDRALAZINE HYDROCHLORIDE 25 MG: 25 TABLET, FILM COATED ORAL at 05:05

## 2021-05-24 RX ADMIN — CLONIDINE HYDROCHLORIDE 0.3 MG: 0.1 TABLET ORAL at 02:05

## 2021-05-24 RX ADMIN — OXYBUTYNIN CHLORIDE 10 MG: 10 TABLET, EXTENDED RELEASE ORAL at 08:05

## 2021-05-24 RX ADMIN — CLONIDINE HYDROCHLORIDE 0.3 MG: 0.1 TABLET ORAL at 08:05

## 2021-05-24 RX ADMIN — NIFEDIPINE 30 MG: 30 TABLET, FILM COATED, EXTENDED RELEASE ORAL at 08:05

## 2021-05-24 RX ADMIN — VENLAFAXINE HYDROCHLORIDE 150 MG: 150 CAPSULE, EXTENDED RELEASE ORAL at 08:05

## 2021-05-24 RX ADMIN — HYDROCODONE BITARTRATE AND ACETAMINOPHEN 1 TABLET: 10; 325 TABLET ORAL at 08:05

## 2021-05-24 RX ADMIN — PROPRANOLOL HYDROCHLORIDE 80 MG: 80 CAPSULE, EXTENDED RELEASE ORAL at 08:05

## 2021-05-24 RX ADMIN — HYDRALAZINE HYDROCHLORIDE 25 MG: 25 TABLET, FILM COATED ORAL at 09:05

## 2021-05-24 RX ADMIN — HEPARIN SODIUM 5000 UNITS: 5000 INJECTION INTRAVENOUS; SUBCUTANEOUS at 02:05

## 2021-05-24 RX ADMIN — METHOCARBAMOL 1000 MG: 500 TABLET ORAL at 08:05

## 2021-05-24 RX ADMIN — CETIRIZINE HYDROCHLORIDE 5 MG: 5 TABLET ORAL at 08:05

## 2021-05-25 LAB
ALBUMIN SERPL BCP-MCNC: 1.8 G/DL (ref 3.5–5.2)
ANION GAP SERPL CALC-SCNC: 13 MMOL/L (ref 8–16)
ANION GAP SERPL CALC-SCNC: 13 MMOL/L (ref 8–16)
ANION GAP SERPL CALC-SCNC: 14 MMOL/L (ref 8–16)
ANION GAP SERPL CALC-SCNC: 17 MMOL/L (ref 8–16)
BASOPHILS # BLD AUTO: 0.05 K/UL (ref 0–0.2)
BASOPHILS NFR BLD: 0.5 % (ref 0–1.9)
BUN SERPL-MCNC: 52 MG/DL (ref 8–23)
BUN SERPL-MCNC: 53 MG/DL (ref 8–23)
CALCIUM SERPL-MCNC: 7.4 MG/DL (ref 8.7–10.5)
CALCIUM SERPL-MCNC: 7.4 MG/DL (ref 8.7–10.5)
CALCIUM SERPL-MCNC: 7.5 MG/DL (ref 8.7–10.5)
CALCIUM SERPL-MCNC: 7.5 MG/DL (ref 8.7–10.5)
CHLORIDE SERPL-SCNC: 84 MMOL/L (ref 95–110)
CHLORIDE SERPL-SCNC: 85 MMOL/L (ref 95–110)
CO2 SERPL-SCNC: 20 MMOL/L (ref 23–29)
CO2 SERPL-SCNC: 24 MMOL/L (ref 23–29)
CO2 SERPL-SCNC: 25 MMOL/L (ref 23–29)
CO2 SERPL-SCNC: 25 MMOL/L (ref 23–29)
CREAT SERPL-MCNC: 4.9 MG/DL (ref 0.5–1.4)
CREAT SERPL-MCNC: 5.1 MG/DL (ref 0.5–1.4)
DIFFERENTIAL METHOD: ABNORMAL
EOSINOPHIL # BLD AUTO: 0.2 K/UL (ref 0–0.5)
EOSINOPHIL NFR BLD: 1.7 % (ref 0–8)
ERYTHROCYTE [DISTWIDTH] IN BLOOD BY AUTOMATED COUNT: 12.9 % (ref 11.5–14.5)
EST. GFR  (AFRICAN AMERICAN): 9.3 ML/MIN/1.73 M^2
EST. GFR  (AFRICAN AMERICAN): 9.8 ML/MIN/1.73 M^2
EST. GFR  (NON AFRICAN AMERICAN): 8.1 ML/MIN/1.73 M^2
EST. GFR  (NON AFRICAN AMERICAN): 8.5 ML/MIN/1.73 M^2
GLUCOSE SERPL-MCNC: 169 MG/DL (ref 70–110)
GLUCOSE SERPL-MCNC: 170 MG/DL (ref 70–110)
GLUCOSE SERPL-MCNC: 173 MG/DL (ref 70–110)
GLUCOSE SERPL-MCNC: 183 MG/DL (ref 70–110)
HCT VFR BLD AUTO: 25.4 % (ref 37–48.5)
HGB BLD-MCNC: 8.6 G/DL (ref 12–16)
IMM GRANULOCYTES # BLD AUTO: 0.25 K/UL (ref 0–0.04)
IMM GRANULOCYTES NFR BLD AUTO: 2.3 % (ref 0–0.5)
LYMPHOCYTES # BLD AUTO: 2.2 K/UL (ref 1–4.8)
LYMPHOCYTES NFR BLD: 19.4 % (ref 18–48)
MAGNESIUM SERPL-MCNC: 1.8 MG/DL (ref 1.6–2.6)
MCH RBC QN AUTO: 28 PG (ref 27–31)
MCHC RBC AUTO-ENTMCNC: 33.9 G/DL (ref 32–36)
MCV RBC AUTO: 83 FL (ref 82–98)
MONOCYTES # BLD AUTO: 1.2 K/UL (ref 0.3–1)
MONOCYTES NFR BLD: 11 % (ref 4–15)
NEUTROPHILS # BLD AUTO: 7.2 K/UL (ref 1.8–7.7)
NEUTROPHILS NFR BLD: 65.1 % (ref 38–73)
NRBC BLD-RTO: 0 /100 WBC
PHOSPHATE SERPL-MCNC: 6.9 MG/DL (ref 2.7–4.5)
PHOSPHATE SERPL-MCNC: 7 MG/DL (ref 2.7–4.5)
PLATELET # BLD AUTO: 268 K/UL (ref 150–450)
PMV BLD AUTO: 9.6 FL (ref 9.2–12.9)
POCT GLUCOSE: 188 MG/DL (ref 70–110)
POCT GLUCOSE: 201 MG/DL (ref 70–110)
POCT GLUCOSE: 225 MG/DL (ref 70–110)
POTASSIUM SERPL-SCNC: 4.2 MMOL/L (ref 3.5–5.1)
POTASSIUM SERPL-SCNC: 4.3 MMOL/L (ref 3.5–5.1)
POTASSIUM SERPL-SCNC: 4.6 MMOL/L (ref 3.5–5.1)
POTASSIUM SERPL-SCNC: 4.6 MMOL/L (ref 3.5–5.1)
RBC # BLD AUTO: 3.07 M/UL (ref 4–5.4)
SODIUM SERPL-SCNC: 121 MMOL/L (ref 136–145)
SODIUM SERPL-SCNC: 122 MMOL/L (ref 136–145)
SODIUM SERPL-SCNC: 122 MMOL/L (ref 136–145)
SODIUM SERPL-SCNC: 123 MMOL/L (ref 136–145)
WBC # BLD AUTO: 11.06 K/UL (ref 3.9–12.7)

## 2021-05-25 PROCEDURE — 25000003 PHARM REV CODE 250: Performed by: STUDENT IN AN ORGANIZED HEALTH CARE EDUCATION/TRAINING PROGRAM

## 2021-05-25 PROCEDURE — 99232 PR SUBSEQUENT HOSPITAL CARE,LEVL II: ICD-10-PCS | Mod: ,,, | Performed by: INTERNAL MEDICINE

## 2021-05-25 PROCEDURE — 85025 COMPLETE CBC W/AUTO DIFF WBC: CPT | Performed by: STUDENT IN AN ORGANIZED HEALTH CARE EDUCATION/TRAINING PROGRAM

## 2021-05-25 PROCEDURE — 99232 SBSQ HOSP IP/OBS MODERATE 35: CPT | Mod: ,,, | Performed by: INTERNAL MEDICINE

## 2021-05-25 PROCEDURE — 80048 BASIC METABOLIC PNL TOTAL CA: CPT | Mod: 91 | Performed by: INTERNAL MEDICINE

## 2021-05-25 PROCEDURE — 36415 COLL VENOUS BLD VENIPUNCTURE: CPT | Performed by: STUDENT IN AN ORGANIZED HEALTH CARE EDUCATION/TRAINING PROGRAM

## 2021-05-25 PROCEDURE — 25000003 PHARM REV CODE 250: Performed by: INTERNAL MEDICINE

## 2021-05-25 PROCEDURE — 63600175 PHARM REV CODE 636 W HCPCS: Performed by: INTERNAL MEDICINE

## 2021-05-25 PROCEDURE — 80069 RENAL FUNCTION PANEL: CPT | Performed by: STUDENT IN AN ORGANIZED HEALTH CARE EDUCATION/TRAINING PROGRAM

## 2021-05-25 PROCEDURE — 83735 ASSAY OF MAGNESIUM: CPT | Performed by: STUDENT IN AN ORGANIZED HEALTH CARE EDUCATION/TRAINING PROGRAM

## 2021-05-25 PROCEDURE — 80048 BASIC METABOLIC PNL TOTAL CA: CPT | Performed by: STUDENT IN AN ORGANIZED HEALTH CARE EDUCATION/TRAINING PROGRAM

## 2021-05-25 PROCEDURE — 36415 COLL VENOUS BLD VENIPUNCTURE: CPT | Performed by: INTERNAL MEDICINE

## 2021-05-25 PROCEDURE — 84100 ASSAY OF PHOSPHORUS: CPT | Performed by: STUDENT IN AN ORGANIZED HEALTH CARE EDUCATION/TRAINING PROGRAM

## 2021-05-25 PROCEDURE — 11000001 HC ACUTE MED/SURG PRIVATE ROOM

## 2021-05-25 PROCEDURE — 99232 SBSQ HOSP IP/OBS MODERATE 35: CPT | Mod: GC,,, | Performed by: INTERNAL MEDICINE

## 2021-05-25 PROCEDURE — 25000003 PHARM REV CODE 250: Performed by: HOSPITALIST

## 2021-05-25 PROCEDURE — 80048 BASIC METABOLIC PNL TOTAL CA: CPT | Mod: 91 | Performed by: STUDENT IN AN ORGANIZED HEALTH CARE EDUCATION/TRAINING PROGRAM

## 2021-05-25 PROCEDURE — 99232 PR SUBSEQUENT HOSPITAL CARE,LEVL II: ICD-10-PCS | Mod: GC,,, | Performed by: INTERNAL MEDICINE

## 2021-05-25 RX ORDER — TIZANIDINE 4 MG/1
4 TABLET ORAL EVERY 8 HOURS
Status: DISCONTINUED | OUTPATIENT
Start: 2021-05-25 | End: 2021-06-02 | Stop reason: HOSPADM

## 2021-05-25 RX ADMIN — HYDROCODONE BITARTRATE AND ACETAMINOPHEN 1 TABLET: 10; 325 TABLET ORAL at 05:05

## 2021-05-25 RX ADMIN — CETIRIZINE HYDROCHLORIDE 5 MG: 5 TABLET ORAL at 10:05

## 2021-05-25 RX ADMIN — INSULIN ASPART 1 UNITS: 100 INJECTION, SOLUTION INTRAVENOUS; SUBCUTANEOUS at 10:05

## 2021-05-25 RX ADMIN — OXYBUTYNIN CHLORIDE 10 MG: 10 TABLET, EXTENDED RELEASE ORAL at 09:05

## 2021-05-25 RX ADMIN — TIZANIDINE 4 MG: 4 TABLET ORAL at 10:05

## 2021-05-25 RX ADMIN — METHOCARBAMOL 1000 MG: 500 TABLET ORAL at 05:05

## 2021-05-25 RX ADMIN — HEPARIN SODIUM 5000 UNITS: 5000 INJECTION INTRAVENOUS; SUBCUTANEOUS at 05:05

## 2021-05-25 RX ADMIN — LEVOTHYROXINE SODIUM 50 MCG: 50 TABLET ORAL at 05:05

## 2021-05-25 RX ADMIN — HYDROCODONE BITARTRATE AND ACETAMINOPHEN 1 TABLET: 10; 325 TABLET ORAL at 01:05

## 2021-05-25 RX ADMIN — HYDROCODONE BITARTRATE AND ACETAMINOPHEN 1 TABLET: 10; 325 TABLET ORAL at 10:05

## 2021-05-25 RX ADMIN — INSULIN DETEMIR 15 UNITS: 100 INJECTION, SOLUTION SUBCUTANEOUS at 10:05

## 2021-05-25 RX ADMIN — CLONIDINE HYDROCHLORIDE 0.3 MG: 0.1 TABLET ORAL at 09:05

## 2021-05-25 RX ADMIN — NIFEDIPINE 30 MG: 30 TABLET, FILM COATED, EXTENDED RELEASE ORAL at 09:05

## 2021-05-25 RX ADMIN — TIZANIDINE 4 MG: 4 TABLET ORAL at 02:05

## 2021-05-25 RX ADMIN — SODIUM BICARBONATE 650 MG TABLET 1300 MG: at 02:05

## 2021-05-25 RX ADMIN — ATORVASTATIN CALCIUM 40 MG: 20 TABLET, FILM COATED ORAL at 09:05

## 2021-05-25 RX ADMIN — VENLAFAXINE HYDROCHLORIDE 150 MG: 150 CAPSULE, EXTENDED RELEASE ORAL at 09:05

## 2021-05-25 RX ADMIN — PROPRANOLOL HYDROCHLORIDE 80 MG: 80 CAPSULE, EXTENDED RELEASE ORAL at 09:05

## 2021-05-25 RX ADMIN — HYDRALAZINE HYDROCHLORIDE 25 MG: 25 TABLET, FILM COATED ORAL at 02:05

## 2021-05-25 RX ADMIN — CLONIDINE HYDROCHLORIDE 0.3 MG: 0.1 TABLET ORAL at 10:05

## 2021-05-25 RX ADMIN — HEPARIN SODIUM 5000 UNITS: 5000 INJECTION INTRAVENOUS; SUBCUTANEOUS at 02:05

## 2021-05-25 RX ADMIN — HYDRALAZINE HYDROCHLORIDE 25 MG: 25 TABLET, FILM COATED ORAL at 05:05

## 2021-05-25 RX ADMIN — CLONIDINE HYDROCHLORIDE 0.3 MG: 0.1 TABLET ORAL at 02:05

## 2021-05-25 RX ADMIN — AZELASTINE HYDROCHLORIDE 137 MCG: 137 SPRAY, METERED NASAL at 10:05

## 2021-05-25 RX ADMIN — SODIUM BICARBONATE 650 MG TABLET 1300 MG: at 09:05

## 2021-05-25 RX ADMIN — HEPARIN SODIUM 5000 UNITS: 5000 INJECTION INTRAVENOUS; SUBCUTANEOUS at 10:05

## 2021-05-25 RX ADMIN — SODIUM BICARBONATE 650 MG TABLET 1300 MG: at 10:05

## 2021-05-25 RX ADMIN — HYDRALAZINE HYDROCHLORIDE 25 MG: 25 TABLET, FILM COATED ORAL at 10:05

## 2021-05-26 LAB
ALBUMIN SERPL BCP-MCNC: 1.8 G/DL (ref 3.5–5.2)
ANION GAP SERPL CALC-SCNC: 14 MMOL/L (ref 8–16)
BASOPHILS # BLD AUTO: 0.05 K/UL (ref 0–0.2)
BASOPHILS NFR BLD: 0.5 % (ref 0–1.9)
BUN SERPL-MCNC: 53 MG/DL (ref 8–23)
BUN SERPL-MCNC: 53 MG/DL (ref 8–23)
BUN SERPL-MCNC: 55 MG/DL (ref 8–23)
CALCIUM SERPL-MCNC: 7.6 MG/DL (ref 8.7–10.5)
CALCIUM SERPL-MCNC: 7.6 MG/DL (ref 8.7–10.5)
CALCIUM SERPL-MCNC: 7.7 MG/DL (ref 8.7–10.5)
CHLORIDE SERPL-SCNC: 85 MMOL/L (ref 95–110)
CO2 SERPL-SCNC: 23 MMOL/L (ref 23–29)
CO2 SERPL-SCNC: 23 MMOL/L (ref 23–29)
CO2 SERPL-SCNC: 24 MMOL/L (ref 23–29)
CREAT SERPL-MCNC: 4.9 MG/DL (ref 0.5–1.4)
CREAT SERPL-MCNC: 5 MG/DL (ref 0.5–1.4)
CREAT SERPL-MCNC: 5 MG/DL (ref 0.5–1.4)
DIFFERENTIAL METHOD: ABNORMAL
EOSINOPHIL # BLD AUTO: 0.2 K/UL (ref 0–0.5)
EOSINOPHIL NFR BLD: 2 % (ref 0–8)
ERYTHROCYTE [DISTWIDTH] IN BLOOD BY AUTOMATED COUNT: 12.9 % (ref 11.5–14.5)
EST. GFR  (AFRICAN AMERICAN): 9.6 ML/MIN/1.73 M^2
EST. GFR  (AFRICAN AMERICAN): 9.6 ML/MIN/1.73 M^2
EST. GFR  (AFRICAN AMERICAN): 9.8 ML/MIN/1.73 M^2
EST. GFR  (NON AFRICAN AMERICAN): 8.3 ML/MIN/1.73 M^2
EST. GFR  (NON AFRICAN AMERICAN): 8.3 ML/MIN/1.73 M^2
EST. GFR  (NON AFRICAN AMERICAN): 8.5 ML/MIN/1.73 M^2
GLUCOSE SERPL-MCNC: 161 MG/DL (ref 70–110)
GLUCOSE SERPL-MCNC: 165 MG/DL (ref 70–110)
GLUCOSE SERPL-MCNC: 208 MG/DL (ref 70–110)
HCT VFR BLD AUTO: 24.5 % (ref 37–48.5)
HGB BLD-MCNC: 8.3 G/DL (ref 12–16)
IMM GRANULOCYTES # BLD AUTO: 0.19 K/UL (ref 0–0.04)
IMM GRANULOCYTES NFR BLD AUTO: 1.8 % (ref 0–0.5)
LYMPHOCYTES # BLD AUTO: 2.3 K/UL (ref 1–4.8)
LYMPHOCYTES NFR BLD: 21.4 % (ref 18–48)
MAGNESIUM SERPL-MCNC: 1.8 MG/DL (ref 1.6–2.6)
MCH RBC QN AUTO: 28.2 PG (ref 27–31)
MCHC RBC AUTO-ENTMCNC: 33.9 G/DL (ref 32–36)
MCV RBC AUTO: 83 FL (ref 82–98)
MONOCYTES # BLD AUTO: 1.1 K/UL (ref 0.3–1)
MONOCYTES NFR BLD: 9.9 % (ref 4–15)
NEUTROPHILS # BLD AUTO: 7 K/UL (ref 1.8–7.7)
NEUTROPHILS NFR BLD: 64.4 % (ref 38–73)
NRBC BLD-RTO: 0 /100 WBC
PHOSPHATE SERPL-MCNC: 6.8 MG/DL (ref 2.7–4.5)
PHOSPHATE SERPL-MCNC: 6.9 MG/DL (ref 2.7–4.5)
PLATELET # BLD AUTO: 287 K/UL (ref 150–450)
PMV BLD AUTO: 9.9 FL (ref 9.2–12.9)
POCT GLUCOSE: 182 MG/DL (ref 70–110)
POCT GLUCOSE: 197 MG/DL (ref 70–110)
POCT GLUCOSE: 202 MG/DL (ref 70–110)
POCT GLUCOSE: 228 MG/DL (ref 70–110)
POCT GLUCOSE: 229 MG/DL (ref 70–110)
POTASSIUM SERPL-SCNC: 4.6 MMOL/L (ref 3.5–5.1)
POTASSIUM SERPL-SCNC: 4.6 MMOL/L (ref 3.5–5.1)
POTASSIUM SERPL-SCNC: 4.7 MMOL/L (ref 3.5–5.1)
RBC # BLD AUTO: 2.94 M/UL (ref 4–5.4)
SODIUM SERPL-SCNC: 122 MMOL/L (ref 136–145)
SODIUM SERPL-SCNC: 122 MMOL/L (ref 136–145)
SODIUM SERPL-SCNC: 123 MMOL/L (ref 136–145)
WBC # BLD AUTO: 10.79 K/UL (ref 3.9–12.7)

## 2021-05-26 PROCEDURE — 11000001 HC ACUTE MED/SURG PRIVATE ROOM

## 2021-05-26 PROCEDURE — 80069 RENAL FUNCTION PANEL: CPT | Performed by: STUDENT IN AN ORGANIZED HEALTH CARE EDUCATION/TRAINING PROGRAM

## 2021-05-26 PROCEDURE — 25000003 PHARM REV CODE 250: Performed by: HOSPITALIST

## 2021-05-26 PROCEDURE — 97116 GAIT TRAINING THERAPY: CPT

## 2021-05-26 PROCEDURE — 99232 SBSQ HOSP IP/OBS MODERATE 35: CPT | Mod: ,,, | Performed by: INTERNAL MEDICINE

## 2021-05-26 PROCEDURE — 63600175 PHARM REV CODE 636 W HCPCS: Performed by: INTERNAL MEDICINE

## 2021-05-26 PROCEDURE — 84100 ASSAY OF PHOSPHORUS: CPT | Performed by: STUDENT IN AN ORGANIZED HEALTH CARE EDUCATION/TRAINING PROGRAM

## 2021-05-26 PROCEDURE — 92610 EVALUATE SWALLOWING FUNCTION: CPT

## 2021-05-26 PROCEDURE — 85025 COMPLETE CBC W/AUTO DIFF WBC: CPT | Performed by: STUDENT IN AN ORGANIZED HEALTH CARE EDUCATION/TRAINING PROGRAM

## 2021-05-26 PROCEDURE — 99232 PR SUBSEQUENT HOSPITAL CARE,LEVL II: ICD-10-PCS | Mod: ,,, | Performed by: INTERNAL MEDICINE

## 2021-05-26 PROCEDURE — 97110 THERAPEUTIC EXERCISES: CPT

## 2021-05-26 PROCEDURE — 25000003 PHARM REV CODE 250: Performed by: STUDENT IN AN ORGANIZED HEALTH CARE EDUCATION/TRAINING PROGRAM

## 2021-05-26 PROCEDURE — 36415 COLL VENOUS BLD VENIPUNCTURE: CPT | Performed by: STUDENT IN AN ORGANIZED HEALTH CARE EDUCATION/TRAINING PROGRAM

## 2021-05-26 PROCEDURE — 97530 THERAPEUTIC ACTIVITIES: CPT

## 2021-05-26 PROCEDURE — 80048 BASIC METABOLIC PNL TOTAL CA: CPT | Performed by: STUDENT IN AN ORGANIZED HEALTH CARE EDUCATION/TRAINING PROGRAM

## 2021-05-26 PROCEDURE — 25000003 PHARM REV CODE 250: Performed by: INTERNAL MEDICINE

## 2021-05-26 PROCEDURE — 83735 ASSAY OF MAGNESIUM: CPT | Performed by: STUDENT IN AN ORGANIZED HEALTH CARE EDUCATION/TRAINING PROGRAM

## 2021-05-26 RX ORDER — INSULIN ASPART 100 [IU]/ML
1-10 INJECTION, SOLUTION INTRAVENOUS; SUBCUTANEOUS
Status: DISCONTINUED | OUTPATIENT
Start: 2021-05-26 | End: 2021-06-02 | Stop reason: HOSPADM

## 2021-05-26 RX ADMIN — TIZANIDINE 4 MG: 4 TABLET ORAL at 01:05

## 2021-05-26 RX ADMIN — SODIUM BICARBONATE 650 MG TABLET 1300 MG: at 04:05

## 2021-05-26 RX ADMIN — OXYBUTYNIN CHLORIDE 10 MG: 10 TABLET, EXTENDED RELEASE ORAL at 09:05

## 2021-05-26 RX ADMIN — HEPARIN SODIUM 5000 UNITS: 5000 INJECTION INTRAVENOUS; SUBCUTANEOUS at 05:05

## 2021-05-26 RX ADMIN — HYDRALAZINE HYDROCHLORIDE 25 MG: 25 TABLET, FILM COATED ORAL at 05:05

## 2021-05-26 RX ADMIN — INSULIN DETEMIR 15 UNITS: 100 INJECTION, SOLUTION SUBCUTANEOUS at 10:05

## 2021-05-26 RX ADMIN — CLONIDINE HYDROCHLORIDE 0.3 MG: 0.1 TABLET ORAL at 01:05

## 2021-05-26 RX ADMIN — INSULIN ASPART 4 UNITS: 100 INJECTION, SOLUTION INTRAVENOUS; SUBCUTANEOUS at 06:05

## 2021-05-26 RX ADMIN — CLONIDINE HYDROCHLORIDE 0.3 MG: 0.1 TABLET ORAL at 09:05

## 2021-05-26 RX ADMIN — HYDROCODONE BITARTRATE AND ACETAMINOPHEN 1 TABLET: 10; 325 TABLET ORAL at 12:05

## 2021-05-26 RX ADMIN — TIZANIDINE 4 MG: 4 TABLET ORAL at 09:05

## 2021-05-26 RX ADMIN — AZELASTINE HYDROCHLORIDE 137 MCG: 137 SPRAY, METERED NASAL at 09:05

## 2021-05-26 RX ADMIN — HYDROCODONE BITARTRATE AND ACETAMINOPHEN 1 TABLET: 10; 325 TABLET ORAL at 05:05

## 2021-05-26 RX ADMIN — HYDRALAZINE HYDROCHLORIDE 25 MG: 25 TABLET, FILM COATED ORAL at 04:05

## 2021-05-26 RX ADMIN — HEPARIN SODIUM 5000 UNITS: 5000 INJECTION INTRAVENOUS; SUBCUTANEOUS at 03:05

## 2021-05-26 RX ADMIN — VENLAFAXINE HYDROCHLORIDE 150 MG: 150 CAPSULE, EXTENDED RELEASE ORAL at 09:05

## 2021-05-26 RX ADMIN — SODIUM BICARBONATE 650 MG TABLET 1300 MG: at 09:05

## 2021-05-26 RX ADMIN — PROPRANOLOL HYDROCHLORIDE 80 MG: 80 CAPSULE, EXTENDED RELEASE ORAL at 01:05

## 2021-05-26 RX ADMIN — HYDRALAZINE HYDROCHLORIDE 25 MG: 25 TABLET, FILM COATED ORAL at 09:05

## 2021-05-26 RX ADMIN — TIZANIDINE 4 MG: 4 TABLET ORAL at 05:05

## 2021-05-26 RX ADMIN — INSULIN ASPART 2 UNITS: 100 INJECTION, SOLUTION INTRAVENOUS; SUBCUTANEOUS at 09:05

## 2021-05-26 RX ADMIN — INSULIN ASPART 2 UNITS: 100 INJECTION, SOLUTION INTRAVENOUS; SUBCUTANEOUS at 01:05

## 2021-05-26 RX ADMIN — HEPARIN SODIUM 5000 UNITS: 5000 INJECTION INTRAVENOUS; SUBCUTANEOUS at 09:05

## 2021-05-26 RX ADMIN — HYDROCODONE BITARTRATE AND ACETAMINOPHEN 1 TABLET: 10; 325 TABLET ORAL at 06:05

## 2021-05-26 RX ADMIN — ATORVASTATIN CALCIUM 40 MG: 20 TABLET, FILM COATED ORAL at 09:05

## 2021-05-26 RX ADMIN — CETIRIZINE HYDROCHLORIDE 5 MG: 5 TABLET ORAL at 09:05

## 2021-05-26 RX ADMIN — LEVOTHYROXINE SODIUM 50 MCG: 50 TABLET ORAL at 05:05

## 2021-05-26 RX ADMIN — NIFEDIPINE 30 MG: 30 TABLET, FILM COATED, EXTENDED RELEASE ORAL at 01:05

## 2021-05-27 ENCOUNTER — TELEPHONE (OUTPATIENT)
Dept: NEPHROLOGY | Facility: CLINIC | Age: 71
End: 2021-05-27

## 2021-05-27 ENCOUNTER — OUTPATIENT CASE MANAGEMENT (OUTPATIENT)
Dept: ADMINISTRATIVE | Facility: OTHER | Age: 71
End: 2021-05-27

## 2021-05-27 LAB
ALBUMIN SERPL BCP-MCNC: 1.8 G/DL (ref 3.5–5.2)
ANION GAP SERPL CALC-SCNC: 13 MMOL/L (ref 8–16)
BASOPHILS # BLD AUTO: 0.05 K/UL (ref 0–0.2)
BASOPHILS NFR BLD: 0.5 % (ref 0–1.9)
BUN SERPL-MCNC: 55 MG/DL (ref 8–23)
CALCIUM SERPL-MCNC: 7.8 MG/DL (ref 8.7–10.5)
CHLORIDE SERPL-SCNC: 86 MMOL/L (ref 95–110)
CO2 SERPL-SCNC: 26 MMOL/L (ref 23–29)
CREAT SERPL-MCNC: 5.2 MG/DL (ref 0.5–1.4)
DIFFERENTIAL METHOD: ABNORMAL
EOSINOPHIL # BLD AUTO: 0.2 K/UL (ref 0–0.5)
EOSINOPHIL NFR BLD: 2.1 % (ref 0–8)
ERYTHROCYTE [DISTWIDTH] IN BLOOD BY AUTOMATED COUNT: 13 % (ref 11.5–14.5)
EST. GFR  (AFRICAN AMERICAN): 9.1 ML/MIN/1.73 M^2
EST. GFR  (NON AFRICAN AMERICAN): 7.9 ML/MIN/1.73 M^2
GLUCOSE SERPL-MCNC: 185 MG/DL (ref 70–110)
HCT VFR BLD AUTO: 23.3 % (ref 37–48.5)
HGB BLD-MCNC: 7.6 G/DL (ref 12–16)
IMM GRANULOCYTES # BLD AUTO: 0.14 K/UL (ref 0–0.04)
IMM GRANULOCYTES NFR BLD AUTO: 1.5 % (ref 0–0.5)
LYMPHOCYTES # BLD AUTO: 2.2 K/UL (ref 1–4.8)
LYMPHOCYTES NFR BLD: 23.1 % (ref 18–48)
MAGNESIUM SERPL-MCNC: 1.8 MG/DL (ref 1.6–2.6)
MCH RBC QN AUTO: 27.9 PG (ref 27–31)
MCHC RBC AUTO-ENTMCNC: 32.6 G/DL (ref 32–36)
MCV RBC AUTO: 86 FL (ref 82–98)
MONOCYTES # BLD AUTO: 1.1 K/UL (ref 0.3–1)
MONOCYTES NFR BLD: 11.6 % (ref 4–15)
NEUTROPHILS # BLD AUTO: 5.8 K/UL (ref 1.8–7.7)
NEUTROPHILS NFR BLD: 61.2 % (ref 38–73)
NRBC BLD-RTO: 0 /100 WBC
PHOSPHATE SERPL-MCNC: 7.1 MG/DL (ref 2.7–4.5)
PHOSPHATE SERPL-MCNC: 7.2 MG/DL (ref 2.7–4.5)
PLATELET # BLD AUTO: 269 K/UL (ref 150–450)
PMV BLD AUTO: 9.6 FL (ref 9.2–12.9)
POCT GLUCOSE: 185 MG/DL (ref 70–110)
POCT GLUCOSE: 214 MG/DL (ref 70–110)
POCT GLUCOSE: 217 MG/DL (ref 70–110)
POCT GLUCOSE: 280 MG/DL (ref 70–110)
POTASSIUM SERPL-SCNC: 4.5 MMOL/L (ref 3.5–5.1)
RBC # BLD AUTO: 2.72 M/UL (ref 4–5.4)
SODIUM SERPL-SCNC: 125 MMOL/L (ref 136–145)
WBC # BLD AUTO: 9.5 K/UL (ref 3.9–12.7)

## 2021-05-27 PROCEDURE — 25000003 PHARM REV CODE 250: Performed by: STUDENT IN AN ORGANIZED HEALTH CARE EDUCATION/TRAINING PROGRAM

## 2021-05-27 PROCEDURE — 80069 RENAL FUNCTION PANEL: CPT | Performed by: STUDENT IN AN ORGANIZED HEALTH CARE EDUCATION/TRAINING PROGRAM

## 2021-05-27 PROCEDURE — 99233 SBSQ HOSP IP/OBS HIGH 50: CPT | Mod: ,,, | Performed by: STUDENT IN AN ORGANIZED HEALTH CARE EDUCATION/TRAINING PROGRAM

## 2021-05-27 PROCEDURE — 99233 PR SUBSEQUENT HOSPITAL CARE,LEVL III: ICD-10-PCS | Mod: ,,, | Performed by: STUDENT IN AN ORGANIZED HEALTH CARE EDUCATION/TRAINING PROGRAM

## 2021-05-27 PROCEDURE — 83735 ASSAY OF MAGNESIUM: CPT | Performed by: STUDENT IN AN ORGANIZED HEALTH CARE EDUCATION/TRAINING PROGRAM

## 2021-05-27 PROCEDURE — 11000001 HC ACUTE MED/SURG PRIVATE ROOM

## 2021-05-27 PROCEDURE — 25000003 PHARM REV CODE 250: Performed by: HOSPITALIST

## 2021-05-27 PROCEDURE — 97530 THERAPEUTIC ACTIVITIES: CPT

## 2021-05-27 PROCEDURE — 84100 ASSAY OF PHOSPHORUS: CPT | Performed by: STUDENT IN AN ORGANIZED HEALTH CARE EDUCATION/TRAINING PROGRAM

## 2021-05-27 PROCEDURE — 85025 COMPLETE CBC W/AUTO DIFF WBC: CPT | Performed by: STUDENT IN AN ORGANIZED HEALTH CARE EDUCATION/TRAINING PROGRAM

## 2021-05-27 PROCEDURE — 25000003 PHARM REV CODE 250: Performed by: INTERNAL MEDICINE

## 2021-05-27 PROCEDURE — 36415 COLL VENOUS BLD VENIPUNCTURE: CPT | Performed by: STUDENT IN AN ORGANIZED HEALTH CARE EDUCATION/TRAINING PROGRAM

## 2021-05-27 PROCEDURE — 63600175 PHARM REV CODE 636 W HCPCS: Performed by: INTERNAL MEDICINE

## 2021-05-27 RX ADMIN — SODIUM BICARBONATE 650 MG TABLET 1300 MG: at 02:05

## 2021-05-27 RX ADMIN — NIFEDIPINE 30 MG: 30 TABLET, FILM COATED, EXTENDED RELEASE ORAL at 08:05

## 2021-05-27 RX ADMIN — INSULIN DETEMIR 15 UNITS: 100 INJECTION, SOLUTION SUBCUTANEOUS at 09:05

## 2021-05-27 RX ADMIN — HYDROCODONE BITARTRATE AND ACETAMINOPHEN 1 TABLET: 10; 325 TABLET ORAL at 07:05

## 2021-05-27 RX ADMIN — PROPRANOLOL HYDROCHLORIDE 80 MG: 80 CAPSULE, EXTENDED RELEASE ORAL at 08:05

## 2021-05-27 RX ADMIN — HYDRALAZINE HYDROCHLORIDE 25 MG: 25 TABLET, FILM COATED ORAL at 02:05

## 2021-05-27 RX ADMIN — HYDRALAZINE HYDROCHLORIDE 25 MG: 25 TABLET, FILM COATED ORAL at 09:05

## 2021-05-27 RX ADMIN — HYDROCODONE BITARTRATE AND ACETAMINOPHEN 1 TABLET: 10; 325 TABLET ORAL at 12:05

## 2021-05-27 RX ADMIN — HEPARIN SODIUM 5000 UNITS: 5000 INJECTION INTRAVENOUS; SUBCUTANEOUS at 09:05

## 2021-05-27 RX ADMIN — LEVOTHYROXINE SODIUM 50 MCG: 50 TABLET ORAL at 05:05

## 2021-05-27 RX ADMIN — HEPARIN SODIUM 5000 UNITS: 5000 INJECTION INTRAVENOUS; SUBCUTANEOUS at 05:05

## 2021-05-27 RX ADMIN — POLYETHYLENE GLYCOL 3350 17 G: 17 POWDER, FOR SOLUTION ORAL at 08:05

## 2021-05-27 RX ADMIN — TIZANIDINE 4 MG: 4 TABLET ORAL at 09:05

## 2021-05-27 RX ADMIN — CLONIDINE HYDROCHLORIDE 0.3 MG: 0.1 TABLET ORAL at 08:05

## 2021-05-27 RX ADMIN — ATORVASTATIN CALCIUM 40 MG: 20 TABLET, FILM COATED ORAL at 08:05

## 2021-05-27 RX ADMIN — SODIUM BICARBONATE 650 MG TABLET 1300 MG: at 08:05

## 2021-05-27 RX ADMIN — INSULIN ASPART 6 UNITS: 100 INJECTION, SOLUTION INTRAVENOUS; SUBCUTANEOUS at 06:05

## 2021-05-27 RX ADMIN — INSULIN ASPART 2 UNITS: 100 INJECTION, SOLUTION INTRAVENOUS; SUBCUTANEOUS at 09:05

## 2021-05-27 RX ADMIN — TIZANIDINE 4 MG: 4 TABLET ORAL at 02:05

## 2021-05-27 RX ADMIN — HYDROCODONE BITARTRATE AND ACETAMINOPHEN 1 TABLET: 10; 325 TABLET ORAL at 04:05

## 2021-05-27 RX ADMIN — HEPARIN SODIUM 5000 UNITS: 5000 INJECTION INTRAVENOUS; SUBCUTANEOUS at 02:05

## 2021-05-27 RX ADMIN — VENLAFAXINE HYDROCHLORIDE 150 MG: 150 CAPSULE, EXTENDED RELEASE ORAL at 08:05

## 2021-05-27 RX ADMIN — CETIRIZINE HYDROCHLORIDE 5 MG: 5 TABLET ORAL at 09:05

## 2021-05-27 RX ADMIN — OXYBUTYNIN CHLORIDE 10 MG: 10 TABLET, EXTENDED RELEASE ORAL at 08:05

## 2021-05-27 RX ADMIN — AZELASTINE HYDROCHLORIDE 137 MCG: 137 SPRAY, METERED NASAL at 09:05

## 2021-05-27 RX ADMIN — SODIUM BICARBONATE 650 MG TABLET 1300 MG: at 09:05

## 2021-05-27 RX ADMIN — AZELASTINE HYDROCHLORIDE 137 MCG: 137 SPRAY, METERED NASAL at 08:05

## 2021-05-27 RX ADMIN — TIZANIDINE 4 MG: 4 TABLET ORAL at 05:05

## 2021-05-27 RX ADMIN — CLONIDINE HYDROCHLORIDE 0.3 MG: 0.1 TABLET ORAL at 09:05

## 2021-05-27 RX ADMIN — INSULIN ASPART 4 UNITS: 100 INJECTION, SOLUTION INTRAVENOUS; SUBCUTANEOUS at 12:05

## 2021-05-27 RX ADMIN — HYDRALAZINE HYDROCHLORIDE 25 MG: 25 TABLET, FILM COATED ORAL at 05:05

## 2021-05-27 RX ADMIN — CLONIDINE HYDROCHLORIDE 0.3 MG: 0.1 TABLET ORAL at 02:05

## 2021-05-27 RX ADMIN — INSULIN ASPART 2 UNITS: 100 INJECTION, SOLUTION INTRAVENOUS; SUBCUTANEOUS at 08:05

## 2021-05-28 ENCOUNTER — TELEPHONE (OUTPATIENT)
Dept: NEPHROLOGY | Facility: CLINIC | Age: 71
End: 2021-05-28

## 2021-05-28 LAB
ALBUMIN SERPL BCP-MCNC: 2.1 G/DL (ref 3.5–5.2)
ANION GAP SERPL CALC-SCNC: 15 MMOL/L (ref 8–16)
BASOPHILS # BLD AUTO: 0.05 K/UL (ref 0–0.2)
BASOPHILS NFR BLD: 0.6 % (ref 0–1.9)
BUN SERPL-MCNC: 55 MG/DL (ref 8–23)
CALCIUM SERPL-MCNC: 8.5 MG/DL (ref 8.7–10.5)
CHLORIDE SERPL-SCNC: 87 MMOL/L (ref 95–110)
CO2 SERPL-SCNC: 26 MMOL/L (ref 23–29)
CREAT SERPL-MCNC: 5.2 MG/DL (ref 0.5–1.4)
DIFFERENTIAL METHOD: ABNORMAL
EOSINOPHIL # BLD AUTO: 0.2 K/UL (ref 0–0.5)
EOSINOPHIL NFR BLD: 2.2 % (ref 0–8)
ERYTHROCYTE [DISTWIDTH] IN BLOOD BY AUTOMATED COUNT: 12.6 % (ref 11.5–14.5)
EST. GFR  (AFRICAN AMERICAN): 9.1 ML/MIN/1.73 M^2
EST. GFR  (NON AFRICAN AMERICAN): 7.9 ML/MIN/1.73 M^2
GLUCOSE SERPL-MCNC: 187 MG/DL (ref 70–110)
HCT VFR BLD AUTO: 25.8 % (ref 37–48.5)
HGB BLD-MCNC: 8.6 G/DL (ref 12–16)
IMM GRANULOCYTES # BLD AUTO: 0.07 K/UL (ref 0–0.04)
IMM GRANULOCYTES NFR BLD AUTO: 0.8 % (ref 0–0.5)
LYMPHOCYTES # BLD AUTO: 2 K/UL (ref 1–4.8)
LYMPHOCYTES NFR BLD: 23.9 % (ref 18–48)
MAGNESIUM SERPL-MCNC: 1.9 MG/DL (ref 1.6–2.6)
MCH RBC QN AUTO: 28.5 PG (ref 27–31)
MCHC RBC AUTO-ENTMCNC: 33.3 G/DL (ref 32–36)
MCV RBC AUTO: 85 FL (ref 82–98)
MONOCYTES # BLD AUTO: 1 K/UL (ref 0.3–1)
MONOCYTES NFR BLD: 11.4 % (ref 4–15)
NEUTROPHILS # BLD AUTO: 5.1 K/UL (ref 1.8–7.7)
NEUTROPHILS NFR BLD: 61.1 % (ref 38–73)
NRBC BLD-RTO: 0 /100 WBC
PHOSPHATE SERPL-MCNC: 7 MG/DL (ref 2.7–4.5)
PHOSPHATE SERPL-MCNC: 7 MG/DL (ref 2.7–4.5)
PLATELET # BLD AUTO: 324 K/UL (ref 150–450)
PMV BLD AUTO: 9.7 FL (ref 9.2–12.9)
POCT GLUCOSE: 197 MG/DL (ref 70–110)
POCT GLUCOSE: 210 MG/DL (ref 70–110)
POCT GLUCOSE: 240 MG/DL (ref 70–110)
POCT GLUCOSE: 253 MG/DL (ref 70–110)
POTASSIUM SERPL-SCNC: 4.4 MMOL/L (ref 3.5–5.1)
RBC # BLD AUTO: 3.02 M/UL (ref 4–5.4)
SODIUM SERPL-SCNC: 128 MMOL/L (ref 136–145)
WBC # BLD AUTO: 8.36 K/UL (ref 3.9–12.7)

## 2021-05-28 PROCEDURE — 99233 SBSQ HOSP IP/OBS HIGH 50: CPT | Mod: 95,,, | Performed by: INTERNAL MEDICINE

## 2021-05-28 PROCEDURE — 85025 COMPLETE CBC W/AUTO DIFF WBC: CPT | Performed by: STUDENT IN AN ORGANIZED HEALTH CARE EDUCATION/TRAINING PROGRAM

## 2021-05-28 PROCEDURE — 36415 COLL VENOUS BLD VENIPUNCTURE: CPT | Performed by: STUDENT IN AN ORGANIZED HEALTH CARE EDUCATION/TRAINING PROGRAM

## 2021-05-28 PROCEDURE — 99232 PR SUBSEQUENT HOSPITAL CARE,LEVL II: ICD-10-PCS | Mod: GC,,, | Performed by: INTERNAL MEDICINE

## 2021-05-28 PROCEDURE — 84100 ASSAY OF PHOSPHORUS: CPT | Performed by: STUDENT IN AN ORGANIZED HEALTH CARE EDUCATION/TRAINING PROGRAM

## 2021-05-28 PROCEDURE — 25000003 PHARM REV CODE 250: Performed by: INTERNAL MEDICINE

## 2021-05-28 PROCEDURE — 25000003 PHARM REV CODE 250: Performed by: STUDENT IN AN ORGANIZED HEALTH CARE EDUCATION/TRAINING PROGRAM

## 2021-05-28 PROCEDURE — 25000003 PHARM REV CODE 250: Performed by: HOSPITALIST

## 2021-05-28 PROCEDURE — 97116 GAIT TRAINING THERAPY: CPT

## 2021-05-28 PROCEDURE — 83735 ASSAY OF MAGNESIUM: CPT | Performed by: STUDENT IN AN ORGANIZED HEALTH CARE EDUCATION/TRAINING PROGRAM

## 2021-05-28 PROCEDURE — 11000001 HC ACUTE MED/SURG PRIVATE ROOM

## 2021-05-28 PROCEDURE — 99233 PR SUBSEQUENT HOSPITAL CARE,LEVL III: ICD-10-PCS | Mod: 95,,, | Performed by: INTERNAL MEDICINE

## 2021-05-28 PROCEDURE — 97530 THERAPEUTIC ACTIVITIES: CPT

## 2021-05-28 PROCEDURE — 63600175 PHARM REV CODE 636 W HCPCS: Performed by: INTERNAL MEDICINE

## 2021-05-28 PROCEDURE — 25000003 PHARM REV CODE 250: Performed by: PHYSICIAN ASSISTANT

## 2021-05-28 PROCEDURE — 99232 SBSQ HOSP IP/OBS MODERATE 35: CPT | Mod: GC,,, | Performed by: INTERNAL MEDICINE

## 2021-05-28 PROCEDURE — 80069 RENAL FUNCTION PANEL: CPT | Performed by: STUDENT IN AN ORGANIZED HEALTH CARE EDUCATION/TRAINING PROGRAM

## 2021-05-28 RX ORDER — SUMATRIPTAN 50 MG/1
100 TABLET, FILM COATED ORAL ONCE
Status: COMPLETED | OUTPATIENT
Start: 2021-05-28 | End: 2021-05-28

## 2021-05-28 RX ORDER — INSULIN LISPRO 100 [IU]/ML
12 INJECTION, SOLUTION INTRAVENOUS; SUBCUTANEOUS
Status: ON HOLD | COMMUNITY
End: 2021-07-06 | Stop reason: SDUPTHER

## 2021-05-28 RX ADMIN — CLONIDINE HYDROCHLORIDE 0.3 MG: 0.1 TABLET ORAL at 10:05

## 2021-05-28 RX ADMIN — TIZANIDINE 4 MG: 4 TABLET ORAL at 06:05

## 2021-05-28 RX ADMIN — INSULIN ASPART 4 UNITS: 100 INJECTION, SOLUTION INTRAVENOUS; SUBCUTANEOUS at 07:05

## 2021-05-28 RX ADMIN — POLYETHYLENE GLYCOL 3350 17 G: 17 POWDER, FOR SOLUTION ORAL at 08:05

## 2021-05-28 RX ADMIN — HYDROCODONE BITARTRATE AND ACETAMINOPHEN 1 TABLET: 10; 325 TABLET ORAL at 10:05

## 2021-05-28 RX ADMIN — TIZANIDINE 4 MG: 4 TABLET ORAL at 01:05

## 2021-05-28 RX ADMIN — SODIUM BICARBONATE 650 MG TABLET 1300 MG: at 02:05

## 2021-05-28 RX ADMIN — HYDRALAZINE HYDROCHLORIDE 25 MG: 25 TABLET, FILM COATED ORAL at 10:05

## 2021-05-28 RX ADMIN — PROPRANOLOL HYDROCHLORIDE 80 MG: 80 CAPSULE, EXTENDED RELEASE ORAL at 08:05

## 2021-05-28 RX ADMIN — SUMATRIPTAN SUCCINATE 100 MG: 50 TABLET ORAL at 01:05

## 2021-05-28 RX ADMIN — SODIUM BICARBONATE 650 MG TABLET 1300 MG: at 10:05

## 2021-05-28 RX ADMIN — VENLAFAXINE HYDROCHLORIDE 150 MG: 150 CAPSULE, EXTENDED RELEASE ORAL at 08:05

## 2021-05-28 RX ADMIN — INSULIN ASPART 3 UNITS: 100 INJECTION, SOLUTION INTRAVENOUS; SUBCUTANEOUS at 10:05

## 2021-05-28 RX ADMIN — LEVOTHYROXINE SODIUM 50 MCG: 50 TABLET ORAL at 06:05

## 2021-05-28 RX ADMIN — ATORVASTATIN CALCIUM 40 MG: 20 TABLET, FILM COATED ORAL at 08:05

## 2021-05-28 RX ADMIN — HYDROCODONE BITARTRATE AND ACETAMINOPHEN 1 TABLET: 10; 325 TABLET ORAL at 01:05

## 2021-05-28 RX ADMIN — NIFEDIPINE 30 MG: 30 TABLET, FILM COATED, EXTENDED RELEASE ORAL at 08:05

## 2021-05-28 RX ADMIN — TIZANIDINE 4 MG: 4 TABLET ORAL at 10:05

## 2021-05-28 RX ADMIN — OXYBUTYNIN CHLORIDE 10 MG: 10 TABLET, EXTENDED RELEASE ORAL at 08:05

## 2021-05-28 RX ADMIN — INSULIN DETEMIR 15 UNITS: 100 INJECTION, SOLUTION SUBCUTANEOUS at 10:05

## 2021-05-28 RX ADMIN — HYDRALAZINE HYDROCHLORIDE 25 MG: 25 TABLET, FILM COATED ORAL at 02:05

## 2021-05-28 RX ADMIN — CETIRIZINE HYDROCHLORIDE 5 MG: 5 TABLET ORAL at 10:05

## 2021-05-28 RX ADMIN — SODIUM BICARBONATE 650 MG TABLET 1300 MG: at 08:05

## 2021-05-28 RX ADMIN — HYDRALAZINE HYDROCHLORIDE 25 MG: 25 TABLET, FILM COATED ORAL at 06:05

## 2021-05-28 RX ADMIN — HEPARIN SODIUM 5000 UNITS: 5000 INJECTION INTRAVENOUS; SUBCUTANEOUS at 10:05

## 2021-05-28 RX ADMIN — CLONIDINE HYDROCHLORIDE 0.3 MG: 0.1 TABLET ORAL at 08:05

## 2021-05-28 RX ADMIN — HEPARIN SODIUM 5000 UNITS: 5000 INJECTION INTRAVENOUS; SUBCUTANEOUS at 06:05

## 2021-05-28 RX ADMIN — AZELASTINE HYDROCHLORIDE 137 MCG: 137 SPRAY, METERED NASAL at 10:05

## 2021-05-28 RX ADMIN — INSULIN ASPART 4 UNITS: 100 INJECTION, SOLUTION INTRAVENOUS; SUBCUTANEOUS at 01:05

## 2021-05-28 RX ADMIN — HEPARIN SODIUM 5000 UNITS: 5000 INJECTION INTRAVENOUS; SUBCUTANEOUS at 01:05

## 2021-05-28 RX ADMIN — HYDROCODONE BITARTRATE AND ACETAMINOPHEN 1 TABLET: 10; 325 TABLET ORAL at 07:05

## 2021-05-28 RX ADMIN — AZELASTINE HYDROCHLORIDE 137 MCG: 137 SPRAY, METERED NASAL at 08:05

## 2021-05-28 RX ADMIN — CLONIDINE HYDROCHLORIDE 0.3 MG: 0.1 TABLET ORAL at 02:05

## 2021-05-29 LAB
ALBUMIN SERPL BCP-MCNC: 2 G/DL (ref 3.5–5.2)
ANION GAP SERPL CALC-SCNC: 12 MMOL/L (ref 8–16)
BASOPHILS # BLD AUTO: 0.04 K/UL (ref 0–0.2)
BASOPHILS NFR BLD: 0.6 % (ref 0–1.9)
BUN SERPL-MCNC: 54 MG/DL (ref 8–23)
CALCIUM SERPL-MCNC: 8.5 MG/DL (ref 8.7–10.5)
CHLORIDE SERPL-SCNC: 89 MMOL/L (ref 95–110)
CO2 SERPL-SCNC: 27 MMOL/L (ref 23–29)
CREAT SERPL-MCNC: 5.1 MG/DL (ref 0.5–1.4)
DIFFERENTIAL METHOD: ABNORMAL
EOSINOPHIL # BLD AUTO: 0.2 K/UL (ref 0–0.5)
EOSINOPHIL NFR BLD: 2.4 % (ref 0–8)
ERYTHROCYTE [DISTWIDTH] IN BLOOD BY AUTOMATED COUNT: 12.7 % (ref 11.5–14.5)
EST. GFR  (AFRICAN AMERICAN): 9.3 ML/MIN/1.73 M^2
EST. GFR  (NON AFRICAN AMERICAN): 8.1 ML/MIN/1.73 M^2
GLUCOSE SERPL-MCNC: 172 MG/DL (ref 70–110)
HCT VFR BLD AUTO: 24.6 % (ref 37–48.5)
HGB BLD-MCNC: 8.1 G/DL (ref 12–16)
IMM GRANULOCYTES # BLD AUTO: 0.05 K/UL (ref 0–0.04)
IMM GRANULOCYTES NFR BLD AUTO: 0.7 % (ref 0–0.5)
LYMPHOCYTES # BLD AUTO: 1.7 K/UL (ref 1–4.8)
LYMPHOCYTES NFR BLD: 24.1 % (ref 18–48)
MAGNESIUM SERPL-MCNC: 1.8 MG/DL (ref 1.6–2.6)
MCH RBC QN AUTO: 27.6 PG (ref 27–31)
MCHC RBC AUTO-ENTMCNC: 32.9 G/DL (ref 32–36)
MCV RBC AUTO: 84 FL (ref 82–98)
MONOCYTES # BLD AUTO: 0.9 K/UL (ref 0.3–1)
MONOCYTES NFR BLD: 12.2 % (ref 4–15)
NEUTROPHILS # BLD AUTO: 4.2 K/UL (ref 1.8–7.7)
NEUTROPHILS NFR BLD: 60 % (ref 38–73)
NRBC BLD-RTO: 0 /100 WBC
PHOSPHATE SERPL-MCNC: 7 MG/DL (ref 2.7–4.5)
PLATELET # BLD AUTO: 299 K/UL (ref 150–450)
PMV BLD AUTO: 9.5 FL (ref 9.2–12.9)
POCT GLUCOSE: 179 MG/DL (ref 70–110)
POCT GLUCOSE: 182 MG/DL (ref 70–110)
POCT GLUCOSE: 209 MG/DL (ref 70–110)
POCT GLUCOSE: 222 MG/DL (ref 70–110)
POTASSIUM SERPL-SCNC: 4.6 MMOL/L (ref 3.5–5.1)
RBC # BLD AUTO: 2.93 M/UL (ref 4–5.4)
SODIUM SERPL-SCNC: 128 MMOL/L (ref 136–145)
WBC # BLD AUTO: 6.97 K/UL (ref 3.9–12.7)

## 2021-05-29 PROCEDURE — 25000003 PHARM REV CODE 250: Performed by: STUDENT IN AN ORGANIZED HEALTH CARE EDUCATION/TRAINING PROGRAM

## 2021-05-29 PROCEDURE — 11000001 HC ACUTE MED/SURG PRIVATE ROOM

## 2021-05-29 PROCEDURE — 83735 ASSAY OF MAGNESIUM: CPT | Performed by: STUDENT IN AN ORGANIZED HEALTH CARE EDUCATION/TRAINING PROGRAM

## 2021-05-29 PROCEDURE — 85025 COMPLETE CBC W/AUTO DIFF WBC: CPT | Performed by: STUDENT IN AN ORGANIZED HEALTH CARE EDUCATION/TRAINING PROGRAM

## 2021-05-29 PROCEDURE — 36415 COLL VENOUS BLD VENIPUNCTURE: CPT | Performed by: STUDENT IN AN ORGANIZED HEALTH CARE EDUCATION/TRAINING PROGRAM

## 2021-05-29 PROCEDURE — 99233 SBSQ HOSP IP/OBS HIGH 50: CPT | Mod: 95,,, | Performed by: INTERNAL MEDICINE

## 2021-05-29 PROCEDURE — 99233 PR SUBSEQUENT HOSPITAL CARE,LEVL III: ICD-10-PCS | Mod: 95,,, | Performed by: INTERNAL MEDICINE

## 2021-05-29 PROCEDURE — 63600175 PHARM REV CODE 636 W HCPCS: Performed by: INTERNAL MEDICINE

## 2021-05-29 PROCEDURE — 25000003 PHARM REV CODE 250: Performed by: HOSPITALIST

## 2021-05-29 PROCEDURE — 80069 RENAL FUNCTION PANEL: CPT | Performed by: STUDENT IN AN ORGANIZED HEALTH CARE EDUCATION/TRAINING PROGRAM

## 2021-05-29 PROCEDURE — 25000003 PHARM REV CODE 250: Performed by: INTERNAL MEDICINE

## 2021-05-29 PROCEDURE — 25000003 PHARM REV CODE 250: Performed by: PHYSICIAN ASSISTANT

## 2021-05-29 RX ORDER — TRAZODONE HYDROCHLORIDE 100 MG/1
100 TABLET ORAL NIGHTLY PRN
Status: DISCONTINUED | OUTPATIENT
Start: 2021-05-29 | End: 2021-06-02 | Stop reason: HOSPADM

## 2021-05-29 RX ORDER — TOPIRAMATE 200 MG/1
200 TABLET ORAL 2 TIMES DAILY
Status: DISCONTINUED | OUTPATIENT
Start: 2021-05-29 | End: 2021-05-31

## 2021-05-29 RX ORDER — SUMATRIPTAN 50 MG/1
100 TABLET, FILM COATED ORAL 2 TIMES DAILY PRN
Status: DISCONTINUED | OUTPATIENT
Start: 2021-05-29 | End: 2021-06-02 | Stop reason: HOSPADM

## 2021-05-29 RX ADMIN — SODIUM BICARBONATE 650 MG TABLET 1300 MG: at 09:05

## 2021-05-29 RX ADMIN — TIZANIDINE 4 MG: 4 TABLET ORAL at 01:05

## 2021-05-29 RX ADMIN — TIZANIDINE 4 MG: 4 TABLET ORAL at 05:05

## 2021-05-29 RX ADMIN — HYDROCODONE BITARTRATE AND ACETAMINOPHEN 1 TABLET: 10; 325 TABLET ORAL at 11:05

## 2021-05-29 RX ADMIN — INSULIN DETEMIR 15 UNITS: 100 INJECTION, SOLUTION SUBCUTANEOUS at 09:05

## 2021-05-29 RX ADMIN — HYDRALAZINE HYDROCHLORIDE 25 MG: 25 TABLET, FILM COATED ORAL at 09:05

## 2021-05-29 RX ADMIN — HEPARIN SODIUM 5000 UNITS: 5000 INJECTION INTRAVENOUS; SUBCUTANEOUS at 01:05

## 2021-05-29 RX ADMIN — LEVOTHYROXINE SODIUM 50 MCG: 50 TABLET ORAL at 05:05

## 2021-05-29 RX ADMIN — HYDROCODONE BITARTRATE AND ACETAMINOPHEN 1 TABLET: 10; 325 TABLET ORAL at 05:05

## 2021-05-29 RX ADMIN — CLONIDINE HYDROCHLORIDE 0.3 MG: 0.1 TABLET ORAL at 04:05

## 2021-05-29 RX ADMIN — OXYBUTYNIN CHLORIDE 10 MG: 10 TABLET, EXTENDED RELEASE ORAL at 09:05

## 2021-05-29 RX ADMIN — TOPIRAMATE 200 MG: 200 TABLET, FILM COATED ORAL at 09:05

## 2021-05-29 RX ADMIN — CLONIDINE HYDROCHLORIDE 0.3 MG: 0.1 TABLET ORAL at 09:05

## 2021-05-29 RX ADMIN — SODIUM BICARBONATE 650 MG TABLET 1300 MG: at 04:05

## 2021-05-29 RX ADMIN — PROPRANOLOL HYDROCHLORIDE 80 MG: 80 CAPSULE, EXTENDED RELEASE ORAL at 09:05

## 2021-05-29 RX ADMIN — VENLAFAXINE HYDROCHLORIDE 150 MG: 150 CAPSULE, EXTENDED RELEASE ORAL at 09:05

## 2021-05-29 RX ADMIN — ATORVASTATIN CALCIUM 40 MG: 20 TABLET, FILM COATED ORAL at 09:05

## 2021-05-29 RX ADMIN — HEPARIN SODIUM 5000 UNITS: 5000 INJECTION INTRAVENOUS; SUBCUTANEOUS at 05:05

## 2021-05-29 RX ADMIN — INSULIN ASPART 2 UNITS: 100 INJECTION, SOLUTION INTRAVENOUS; SUBCUTANEOUS at 09:05

## 2021-05-29 RX ADMIN — NIFEDIPINE 30 MG: 30 TABLET, FILM COATED, EXTENDED RELEASE ORAL at 09:05

## 2021-05-29 RX ADMIN — INSULIN ASPART 4 UNITS: 100 INJECTION, SOLUTION INTRAVENOUS; SUBCUTANEOUS at 05:05

## 2021-05-29 RX ADMIN — AZELASTINE HYDROCHLORIDE 137 MCG: 137 SPRAY, METERED NASAL at 09:05

## 2021-05-29 RX ADMIN — TIZANIDINE 4 MG: 4 TABLET ORAL at 09:05

## 2021-05-29 RX ADMIN — INSULIN ASPART 4 UNITS: 100 INJECTION, SOLUTION INTRAVENOUS; SUBCUTANEOUS at 01:05

## 2021-05-29 RX ADMIN — SUMATRIPTAN SUCCINATE 100 MG: 50 TABLET ORAL at 02:05

## 2021-05-29 RX ADMIN — HYDRALAZINE HYDROCHLORIDE 25 MG: 25 TABLET, FILM COATED ORAL at 05:05

## 2021-05-29 RX ADMIN — HYDRALAZINE HYDROCHLORIDE 25 MG: 25 TABLET, FILM COATED ORAL at 01:05

## 2021-05-29 RX ADMIN — HEPARIN SODIUM 5000 UNITS: 5000 INJECTION INTRAVENOUS; SUBCUTANEOUS at 09:05

## 2021-05-29 RX ADMIN — CETIRIZINE HYDROCHLORIDE 5 MG: 5 TABLET ORAL at 09:05

## 2021-05-30 LAB
ALBUMIN SERPL BCP-MCNC: 2 G/DL (ref 3.5–5.2)
ALBUMIN SERPL BCP-MCNC: 2 G/DL (ref 3.5–5.2)
ALP SERPL-CCNC: 172 U/L (ref 55–135)
ALT SERPL W/O P-5'-P-CCNC: 5 U/L (ref 10–44)
ANION GAP SERPL CALC-SCNC: 15 MMOL/L (ref 8–16)
AST SERPL-CCNC: 12 U/L (ref 10–40)
BASOPHILS # BLD AUTO: 0.06 K/UL (ref 0–0.2)
BASOPHILS NFR BLD: 1.1 % (ref 0–1.9)
BILIRUB DIRECT SERPL-MCNC: 0.1 MG/DL (ref 0.1–0.3)
BILIRUB SERPL-MCNC: 0.2 MG/DL (ref 0.1–1)
BUN SERPL-MCNC: 51 MG/DL (ref 8–23)
CALCIUM SERPL-MCNC: 8.6 MG/DL (ref 8.7–10.5)
CHLORIDE SERPL-SCNC: 94 MMOL/L (ref 95–110)
CO2 SERPL-SCNC: 23 MMOL/L (ref 23–29)
CREAT SERPL-MCNC: 4.7 MG/DL (ref 0.5–1.4)
DIFFERENTIAL METHOD: ABNORMAL
EOSINOPHIL # BLD AUTO: 0.2 K/UL (ref 0–0.5)
EOSINOPHIL NFR BLD: 3.3 % (ref 0–8)
ERYTHROCYTE [DISTWIDTH] IN BLOOD BY AUTOMATED COUNT: 12.7 % (ref 11.5–14.5)
EST. GFR  (AFRICAN AMERICAN): 10.3 ML/MIN/1.73 M^2
EST. GFR  (NON AFRICAN AMERICAN): 8.9 ML/MIN/1.73 M^2
GLUCOSE SERPL-MCNC: 192 MG/DL (ref 70–110)
HCT VFR BLD AUTO: 24.3 % (ref 37–48.5)
HGB BLD-MCNC: 7.9 G/DL (ref 12–16)
IMM GRANULOCYTES # BLD AUTO: 0.06 K/UL (ref 0–0.04)
IMM GRANULOCYTES NFR BLD AUTO: 1.1 % (ref 0–0.5)
LYMPHOCYTES # BLD AUTO: 1.5 K/UL (ref 1–4.8)
LYMPHOCYTES NFR BLD: 27.5 % (ref 18–48)
MAGNESIUM SERPL-MCNC: 1.7 MG/DL (ref 1.6–2.6)
MCH RBC QN AUTO: 27.8 PG (ref 27–31)
MCHC RBC AUTO-ENTMCNC: 32.5 G/DL (ref 32–36)
MCV RBC AUTO: 86 FL (ref 82–98)
MONOCYTES # BLD AUTO: 0.6 K/UL (ref 0.3–1)
MONOCYTES NFR BLD: 10.2 % (ref 4–15)
NEUTROPHILS # BLD AUTO: 3.1 K/UL (ref 1.8–7.7)
NEUTROPHILS NFR BLD: 56.8 % (ref 38–73)
NRBC BLD-RTO: 0 /100 WBC
PHOSPHATE SERPL-MCNC: 6.9 MG/DL (ref 2.7–4.5)
PLATELET # BLD AUTO: 323 K/UL (ref 150–450)
PMV BLD AUTO: 9.6 FL (ref 9.2–12.9)
POCT GLUCOSE: 178 MG/DL (ref 70–110)
POCT GLUCOSE: 180 MG/DL (ref 70–110)
POCT GLUCOSE: 204 MG/DL (ref 70–110)
POTASSIUM SERPL-SCNC: 4.7 MMOL/L (ref 3.5–5.1)
PROT SERPL-MCNC: 5.3 G/DL (ref 6–8.4)
RBC # BLD AUTO: 2.84 M/UL (ref 4–5.4)
SODIUM SERPL-SCNC: 132 MMOL/L (ref 136–145)
WBC # BLD AUTO: 5.39 K/UL (ref 3.9–12.7)

## 2021-05-30 PROCEDURE — 36415 COLL VENOUS BLD VENIPUNCTURE: CPT | Performed by: STUDENT IN AN ORGANIZED HEALTH CARE EDUCATION/TRAINING PROGRAM

## 2021-05-30 PROCEDURE — 63600175 PHARM REV CODE 636 W HCPCS: Performed by: INTERNAL MEDICINE

## 2021-05-30 PROCEDURE — 80076 HEPATIC FUNCTION PANEL: CPT | Performed by: INTERNAL MEDICINE

## 2021-05-30 PROCEDURE — 25000003 PHARM REV CODE 250: Performed by: INTERNAL MEDICINE

## 2021-05-30 PROCEDURE — 25000003 PHARM REV CODE 250: Performed by: STUDENT IN AN ORGANIZED HEALTH CARE EDUCATION/TRAINING PROGRAM

## 2021-05-30 PROCEDURE — 99233 PR SUBSEQUENT HOSPITAL CARE,LEVL III: ICD-10-PCS | Mod: 95,,, | Performed by: INTERNAL MEDICINE

## 2021-05-30 PROCEDURE — 80069 RENAL FUNCTION PANEL: CPT | Performed by: STUDENT IN AN ORGANIZED HEALTH CARE EDUCATION/TRAINING PROGRAM

## 2021-05-30 PROCEDURE — 99233 SBSQ HOSP IP/OBS HIGH 50: CPT | Mod: 95,,, | Performed by: INTERNAL MEDICINE

## 2021-05-30 PROCEDURE — 25000003 PHARM REV CODE 250: Performed by: PHYSICIAN ASSISTANT

## 2021-05-30 PROCEDURE — 11000001 HC ACUTE MED/SURG PRIVATE ROOM

## 2021-05-30 PROCEDURE — 85025 COMPLETE CBC W/AUTO DIFF WBC: CPT | Performed by: STUDENT IN AN ORGANIZED HEALTH CARE EDUCATION/TRAINING PROGRAM

## 2021-05-30 PROCEDURE — 25000003 PHARM REV CODE 250: Performed by: HOSPITALIST

## 2021-05-30 PROCEDURE — 83735 ASSAY OF MAGNESIUM: CPT | Performed by: STUDENT IN AN ORGANIZED HEALTH CARE EDUCATION/TRAINING PROGRAM

## 2021-05-30 RX ORDER — NIFEDIPINE 60 MG/1
60 TABLET, EXTENDED RELEASE ORAL DAILY
Status: DISCONTINUED | OUTPATIENT
Start: 2021-05-31 | End: 2021-06-02 | Stop reason: HOSPADM

## 2021-05-30 RX ORDER — NIFEDIPINE 30 MG/1
30 TABLET, EXTENDED RELEASE ORAL ONCE
Status: COMPLETED | OUTPATIENT
Start: 2021-05-30 | End: 2021-05-30

## 2021-05-30 RX ADMIN — HYDROCODONE BITARTRATE AND ACETAMINOPHEN 1 TABLET: 10; 325 TABLET ORAL at 06:05

## 2021-05-30 RX ADMIN — SUMATRIPTAN SUCCINATE 100 MG: 50 TABLET ORAL at 07:05

## 2021-05-30 RX ADMIN — AZELASTINE HYDROCHLORIDE 137 MCG: 137 SPRAY, METERED NASAL at 09:05

## 2021-05-30 RX ADMIN — TIZANIDINE 4 MG: 4 TABLET ORAL at 01:05

## 2021-05-30 RX ADMIN — CLONIDINE HYDROCHLORIDE 0.3 MG: 0.1 TABLET ORAL at 09:05

## 2021-05-30 RX ADMIN — HEPARIN SODIUM 5000 UNITS: 5000 INJECTION INTRAVENOUS; SUBCUTANEOUS at 10:05

## 2021-05-30 RX ADMIN — LEVOTHYROXINE SODIUM 50 MCG: 50 TABLET ORAL at 06:05

## 2021-05-30 RX ADMIN — CLONIDINE HYDROCHLORIDE 0.3 MG: 0.1 TABLET ORAL at 10:05

## 2021-05-30 RX ADMIN — INSULIN ASPART 2 UNITS: 100 INJECTION, SOLUTION INTRAVENOUS; SUBCUTANEOUS at 05:05

## 2021-05-30 RX ADMIN — NIFEDIPINE 30 MG: 30 TABLET, FILM COATED, EXTENDED RELEASE ORAL at 09:05

## 2021-05-30 RX ADMIN — INSULIN ASPART 4 UNITS: 100 INJECTION, SOLUTION INTRAVENOUS; SUBCUTANEOUS at 09:05

## 2021-05-30 RX ADMIN — OXYBUTYNIN CHLORIDE 10 MG: 10 TABLET, EXTENDED RELEASE ORAL at 09:05

## 2021-05-30 RX ADMIN — TIZANIDINE 4 MG: 4 TABLET ORAL at 06:05

## 2021-05-30 RX ADMIN — HYDRALAZINE HYDROCHLORIDE 25 MG: 25 TABLET, FILM COATED ORAL at 10:05

## 2021-05-30 RX ADMIN — PROPRANOLOL HYDROCHLORIDE 80 MG: 80 CAPSULE, EXTENDED RELEASE ORAL at 09:05

## 2021-05-30 RX ADMIN — SODIUM BICARBONATE 650 MG TABLET 1300 MG: at 10:05

## 2021-05-30 RX ADMIN — INSULIN ASPART 2 UNITS: 100 INJECTION, SOLUTION INTRAVENOUS; SUBCUTANEOUS at 01:05

## 2021-05-30 RX ADMIN — CETIRIZINE HYDROCHLORIDE 5 MG: 5 TABLET ORAL at 10:05

## 2021-05-30 RX ADMIN — TOPIRAMATE 200 MG: 200 TABLET, FILM COATED ORAL at 09:05

## 2021-05-30 RX ADMIN — AZELASTINE HYDROCHLORIDE 137 MCG: 137 SPRAY, METERED NASAL at 10:05

## 2021-05-30 RX ADMIN — INSULIN DETEMIR 15 UNITS: 100 INJECTION, SOLUTION SUBCUTANEOUS at 10:05

## 2021-05-30 RX ADMIN — HYDRALAZINE HYDROCHLORIDE 25 MG: 25 TABLET, FILM COATED ORAL at 01:05

## 2021-05-30 RX ADMIN — VENLAFAXINE HYDROCHLORIDE 150 MG: 150 CAPSULE, EXTENDED RELEASE ORAL at 09:05

## 2021-05-30 RX ADMIN — CLONIDINE HYDROCHLORIDE 0.3 MG: 0.1 TABLET ORAL at 04:05

## 2021-05-30 RX ADMIN — HYDROCODONE BITARTRATE AND ACETAMINOPHEN 1 TABLET: 10; 325 TABLET ORAL at 05:05

## 2021-05-30 RX ADMIN — NIFEDIPINE 30 MG: 30 TABLET, FILM COATED, EXTENDED RELEASE ORAL at 05:05

## 2021-05-30 RX ADMIN — HEPARIN SODIUM 5000 UNITS: 5000 INJECTION INTRAVENOUS; SUBCUTANEOUS at 06:05

## 2021-05-30 RX ADMIN — TIZANIDINE 4 MG: 4 TABLET ORAL at 10:05

## 2021-05-30 RX ADMIN — HEPARIN SODIUM 5000 UNITS: 5000 INJECTION INTRAVENOUS; SUBCUTANEOUS at 01:05

## 2021-05-30 RX ADMIN — SODIUM BICARBONATE 650 MG TABLET 1300 MG: at 04:05

## 2021-05-30 RX ADMIN — TOPIRAMATE 200 MG: 200 TABLET, FILM COATED ORAL at 10:05

## 2021-05-30 RX ADMIN — HYDRALAZINE HYDROCHLORIDE 25 MG: 25 TABLET, FILM COATED ORAL at 06:05

## 2021-05-30 RX ADMIN — ATORVASTATIN CALCIUM 40 MG: 20 TABLET, FILM COATED ORAL at 09:05

## 2021-05-30 RX ADMIN — SODIUM BICARBONATE 650 MG TABLET 1300 MG: at 09:05

## 2021-05-31 LAB
ALBUMIN SERPL BCP-MCNC: 2 G/DL (ref 3.5–5.2)
ANION GAP SERPL CALC-SCNC: 11 MMOL/L (ref 8–16)
BASOPHILS # BLD AUTO: 0.05 K/UL (ref 0–0.2)
BASOPHILS NFR BLD: 0.9 % (ref 0–1.9)
BUN SERPL-MCNC: 48 MG/DL (ref 8–23)
CALCIUM SERPL-MCNC: 8.7 MG/DL (ref 8.7–10.5)
CHLORIDE SERPL-SCNC: 97 MMOL/L (ref 95–110)
CO2 SERPL-SCNC: 26 MMOL/L (ref 23–29)
CREAT SERPL-MCNC: 4.6 MG/DL (ref 0.5–1.4)
DIFFERENTIAL METHOD: ABNORMAL
EOSINOPHIL # BLD AUTO: 0.2 K/UL (ref 0–0.5)
EOSINOPHIL NFR BLD: 3.5 % (ref 0–8)
ERYTHROCYTE [DISTWIDTH] IN BLOOD BY AUTOMATED COUNT: 12.8 % (ref 11.5–14.5)
EST. GFR  (AFRICAN AMERICAN): 10.6 ML/MIN/1.73 M^2
EST. GFR  (NON AFRICAN AMERICAN): 9.2 ML/MIN/1.73 M^2
GLUCOSE SERPL-MCNC: 149 MG/DL (ref 70–110)
HCT VFR BLD AUTO: 25.7 % (ref 37–48.5)
HGB BLD-MCNC: 8.2 G/DL (ref 12–16)
IMM GRANULOCYTES # BLD AUTO: 0.03 K/UL (ref 0–0.04)
IMM GRANULOCYTES NFR BLD AUTO: 0.5 % (ref 0–0.5)
LYMPHOCYTES # BLD AUTO: 1.6 K/UL (ref 1–4.8)
LYMPHOCYTES NFR BLD: 27.4 % (ref 18–48)
MAGNESIUM SERPL-MCNC: 1.8 MG/DL (ref 1.6–2.6)
MCH RBC QN AUTO: 28 PG (ref 27–31)
MCHC RBC AUTO-ENTMCNC: 31.9 G/DL (ref 32–36)
MCV RBC AUTO: 88 FL (ref 82–98)
MONOCYTES # BLD AUTO: 0.6 K/UL (ref 0.3–1)
MONOCYTES NFR BLD: 11 % (ref 4–15)
NEUTROPHILS # BLD AUTO: 3.2 K/UL (ref 1.8–7.7)
NEUTROPHILS NFR BLD: 56.7 % (ref 38–73)
NRBC BLD-RTO: 0 /100 WBC
PHOSPHATE SERPL-MCNC: 7 MG/DL (ref 2.7–4.5)
PLATELET # BLD AUTO: 355 K/UL (ref 150–450)
PMV BLD AUTO: 9.5 FL (ref 9.2–12.9)
POCT GLUCOSE: 135 MG/DL (ref 70–110)
POCT GLUCOSE: 167 MG/DL (ref 70–110)
POCT GLUCOSE: 170 MG/DL (ref 70–110)
POCT GLUCOSE: 194 MG/DL (ref 70–110)
POCT GLUCOSE: 202 MG/DL (ref 70–110)
POTASSIUM SERPL-SCNC: 4.4 MMOL/L (ref 3.5–5.1)
RBC # BLD AUTO: 2.93 M/UL (ref 4–5.4)
SODIUM SERPL-SCNC: 134 MMOL/L (ref 136–145)
WBC # BLD AUTO: 5.65 K/UL (ref 3.9–12.7)

## 2021-05-31 PROCEDURE — 83735 ASSAY OF MAGNESIUM: CPT | Performed by: STUDENT IN AN ORGANIZED HEALTH CARE EDUCATION/TRAINING PROGRAM

## 2021-05-31 PROCEDURE — 25000003 PHARM REV CODE 250: Performed by: PHYSICIAN ASSISTANT

## 2021-05-31 PROCEDURE — 11000001 HC ACUTE MED/SURG PRIVATE ROOM

## 2021-05-31 PROCEDURE — 80069 RENAL FUNCTION PANEL: CPT | Performed by: STUDENT IN AN ORGANIZED HEALTH CARE EDUCATION/TRAINING PROGRAM

## 2021-05-31 PROCEDURE — 25000003 PHARM REV CODE 250: Performed by: STUDENT IN AN ORGANIZED HEALTH CARE EDUCATION/TRAINING PROGRAM

## 2021-05-31 PROCEDURE — 99233 PR SUBSEQUENT HOSPITAL CARE,LEVL III: ICD-10-PCS | Mod: 95,,, | Performed by: INTERNAL MEDICINE

## 2021-05-31 PROCEDURE — 63600175 PHARM REV CODE 636 W HCPCS: Performed by: INTERNAL MEDICINE

## 2021-05-31 PROCEDURE — 25000003 PHARM REV CODE 250: Performed by: INTERNAL MEDICINE

## 2021-05-31 PROCEDURE — 85025 COMPLETE CBC W/AUTO DIFF WBC: CPT | Performed by: STUDENT IN AN ORGANIZED HEALTH CARE EDUCATION/TRAINING PROGRAM

## 2021-05-31 PROCEDURE — 25000003 PHARM REV CODE 250: Performed by: HOSPITALIST

## 2021-05-31 PROCEDURE — 36415 COLL VENOUS BLD VENIPUNCTURE: CPT | Performed by: STUDENT IN AN ORGANIZED HEALTH CARE EDUCATION/TRAINING PROGRAM

## 2021-05-31 PROCEDURE — 99233 SBSQ HOSP IP/OBS HIGH 50: CPT | Mod: 95,,, | Performed by: INTERNAL MEDICINE

## 2021-05-31 RX ORDER — OXYCODONE AND ACETAMINOPHEN 10; 325 MG/1; MG/1
1 TABLET ORAL EVERY 6 HOURS PRN
Status: DISCONTINUED | OUTPATIENT
Start: 2021-05-31 | End: 2021-06-02 | Stop reason: HOSPADM

## 2021-05-31 RX ORDER — TOPIRAMATE 100 MG/1
100 TABLET, FILM COATED ORAL 2 TIMES DAILY
Status: DISCONTINUED | OUTPATIENT
Start: 2021-05-31 | End: 2021-05-31

## 2021-05-31 RX ORDER — TOPIRAMATE 100 MG/1
100 TABLET, FILM COATED ORAL 2 TIMES DAILY
Status: DISCONTINUED | OUTPATIENT
Start: 2021-06-01 | End: 2021-06-01

## 2021-05-31 RX ADMIN — LEVOTHYROXINE SODIUM 50 MCG: 50 TABLET ORAL at 06:05

## 2021-05-31 RX ADMIN — CLONIDINE HYDROCHLORIDE 0.3 MG: 0.1 TABLET ORAL at 02:05

## 2021-05-31 RX ADMIN — HYDROCODONE BITARTRATE AND ACETAMINOPHEN 1 TABLET: 10; 325 TABLET ORAL at 08:05

## 2021-05-31 RX ADMIN — PROPRANOLOL HYDROCHLORIDE 80 MG: 80 CAPSULE, EXTENDED RELEASE ORAL at 08:05

## 2021-05-31 RX ADMIN — HYDRALAZINE HYDROCHLORIDE 25 MG: 25 TABLET, FILM COATED ORAL at 09:05

## 2021-05-31 RX ADMIN — OXYCODONE AND ACETAMINOPHEN 1 TABLET: 10; 325 TABLET ORAL at 06:05

## 2021-05-31 RX ADMIN — VENLAFAXINE HYDROCHLORIDE 150 MG: 150 CAPSULE, EXTENDED RELEASE ORAL at 08:05

## 2021-05-31 RX ADMIN — SODIUM BICARBONATE 650 MG TABLET 1300 MG: at 08:05

## 2021-05-31 RX ADMIN — ATORVASTATIN CALCIUM 40 MG: 20 TABLET, FILM COATED ORAL at 08:05

## 2021-05-31 RX ADMIN — OXYBUTYNIN CHLORIDE 10 MG: 10 TABLET, EXTENDED RELEASE ORAL at 08:05

## 2021-05-31 RX ADMIN — INSULIN DETEMIR 15 UNITS: 100 INJECTION, SOLUTION SUBCUTANEOUS at 11:05

## 2021-05-31 RX ADMIN — SUMATRIPTAN SUCCINATE 100 MG: 50 TABLET ORAL at 11:05

## 2021-05-31 RX ADMIN — HEPARIN SODIUM 5000 UNITS: 5000 INJECTION INTRAVENOUS; SUBCUTANEOUS at 02:05

## 2021-05-31 RX ADMIN — TIZANIDINE 4 MG: 4 TABLET ORAL at 06:05

## 2021-05-31 RX ADMIN — POLYETHYLENE GLYCOL 3350 17 G: 17 POWDER, FOR SOLUTION ORAL at 08:05

## 2021-05-31 RX ADMIN — SODIUM BICARBONATE 650 MG TABLET 1300 MG: at 09:05

## 2021-05-31 RX ADMIN — TOPIRAMATE 200 MG: 200 TABLET, FILM COATED ORAL at 08:05

## 2021-05-31 RX ADMIN — TIZANIDINE 4 MG: 4 TABLET ORAL at 02:05

## 2021-05-31 RX ADMIN — INSULIN ASPART 2 UNITS: 100 INJECTION, SOLUTION INTRAVENOUS; SUBCUTANEOUS at 08:05

## 2021-05-31 RX ADMIN — HEPARIN SODIUM 5000 UNITS: 5000 INJECTION INTRAVENOUS; SUBCUTANEOUS at 06:05

## 2021-05-31 RX ADMIN — HYDRALAZINE HYDROCHLORIDE 25 MG: 25 TABLET, FILM COATED ORAL at 02:05

## 2021-05-31 RX ADMIN — AZELASTINE HYDROCHLORIDE 137 MCG: 137 SPRAY, METERED NASAL at 09:05

## 2021-05-31 RX ADMIN — AZELASTINE HYDROCHLORIDE 137 MCG: 137 SPRAY, METERED NASAL at 08:05

## 2021-05-31 RX ADMIN — SODIUM BICARBONATE 650 MG TABLET 1300 MG: at 02:05

## 2021-05-31 RX ADMIN — HEPARIN SODIUM 5000 UNITS: 5000 INJECTION INTRAVENOUS; SUBCUTANEOUS at 09:05

## 2021-05-31 RX ADMIN — NIFEDIPINE 60 MG: 60 TABLET, FILM COATED, EXTENDED RELEASE ORAL at 08:05

## 2021-05-31 RX ADMIN — CETIRIZINE HYDROCHLORIDE 5 MG: 5 TABLET ORAL at 09:05

## 2021-05-31 RX ADMIN — HYDRALAZINE HYDROCHLORIDE 25 MG: 25 TABLET, FILM COATED ORAL at 06:05

## 2021-05-31 RX ADMIN — CLONIDINE HYDROCHLORIDE 0.3 MG: 0.1 TABLET ORAL at 09:05

## 2021-05-31 RX ADMIN — TIZANIDINE 4 MG: 4 TABLET ORAL at 09:05

## 2021-05-31 RX ADMIN — CLONIDINE HYDROCHLORIDE 0.3 MG: 0.1 TABLET ORAL at 08:05

## 2021-06-01 ENCOUNTER — OUTPATIENT CASE MANAGEMENT (OUTPATIENT)
Dept: ADMINISTRATIVE | Facility: OTHER | Age: 71
End: 2021-06-01

## 2021-06-01 LAB
ALBUMIN SERPL BCP-MCNC: 2.2 G/DL (ref 3.5–5.2)
ANION GAP SERPL CALC-SCNC: 14 MMOL/L (ref 8–16)
BASOPHILS # BLD AUTO: 0.04 K/UL (ref 0–0.2)
BASOPHILS NFR BLD: 0.7 % (ref 0–1.9)
BUN SERPL-MCNC: 45 MG/DL (ref 8–23)
CALCIUM SERPL-MCNC: 9 MG/DL (ref 8.7–10.5)
CHLORIDE SERPL-SCNC: 99 MMOL/L (ref 95–110)
CO2 SERPL-SCNC: 24 MMOL/L (ref 23–29)
CREAT SERPL-MCNC: 4.4 MG/DL (ref 0.5–1.4)
DIFFERENTIAL METHOD: ABNORMAL
EOSINOPHIL # BLD AUTO: 0.2 K/UL (ref 0–0.5)
EOSINOPHIL NFR BLD: 4.2 % (ref 0–8)
ERYTHROCYTE [DISTWIDTH] IN BLOOD BY AUTOMATED COUNT: 12.9 % (ref 11.5–14.5)
EST. GFR  (AFRICAN AMERICAN): 11.2 ML/MIN/1.73 M^2
EST. GFR  (NON AFRICAN AMERICAN): 9.7 ML/MIN/1.73 M^2
GLUCOSE SERPL-MCNC: 114 MG/DL (ref 70–110)
HCT VFR BLD AUTO: 26.9 % (ref 37–48.5)
HGB BLD-MCNC: 8.6 G/DL (ref 12–16)
IMM GRANULOCYTES # BLD AUTO: 0.02 K/UL (ref 0–0.04)
IMM GRANULOCYTES NFR BLD AUTO: 0.4 % (ref 0–0.5)
LYMPHOCYTES # BLD AUTO: 1.8 K/UL (ref 1–4.8)
LYMPHOCYTES NFR BLD: 32.7 % (ref 18–48)
MAGNESIUM SERPL-MCNC: 1.8 MG/DL (ref 1.6–2.6)
MCH RBC QN AUTO: 27.8 PG (ref 27–31)
MCHC RBC AUTO-ENTMCNC: 32 G/DL (ref 32–36)
MCV RBC AUTO: 87 FL (ref 82–98)
MONOCYTES # BLD AUTO: 0.7 K/UL (ref 0.3–1)
MONOCYTES NFR BLD: 11.9 % (ref 4–15)
NEUTROPHILS # BLD AUTO: 2.8 K/UL (ref 1.8–7.7)
NEUTROPHILS NFR BLD: 50.1 % (ref 38–73)
NRBC BLD-RTO: 0 /100 WBC
PHOSPHATE SERPL-MCNC: 6.8 MG/DL (ref 2.7–4.5)
PLATELET # BLD AUTO: 353 K/UL (ref 150–450)
PMV BLD AUTO: 9.5 FL (ref 9.2–12.9)
POCT GLUCOSE: 130 MG/DL (ref 70–110)
POCT GLUCOSE: 137 MG/DL (ref 70–110)
POCT GLUCOSE: 180 MG/DL (ref 70–110)
POCT GLUCOSE: 180 MG/DL (ref 70–110)
POTASSIUM SERPL-SCNC: 4.3 MMOL/L (ref 3.5–5.1)
RBC # BLD AUTO: 3.09 M/UL (ref 4–5.4)
SODIUM SERPL-SCNC: 137 MMOL/L (ref 136–145)
WBC # BLD AUTO: 5.48 K/UL (ref 3.9–12.7)

## 2021-06-01 PROCEDURE — 25000003 PHARM REV CODE 250: Performed by: PHYSICIAN ASSISTANT

## 2021-06-01 PROCEDURE — 99233 SBSQ HOSP IP/OBS HIGH 50: CPT | Mod: 95,,, | Performed by: INTERNAL MEDICINE

## 2021-06-01 PROCEDURE — 63600175 PHARM REV CODE 636 W HCPCS: Performed by: INTERNAL MEDICINE

## 2021-06-01 PROCEDURE — 83735 ASSAY OF MAGNESIUM: CPT | Performed by: STUDENT IN AN ORGANIZED HEALTH CARE EDUCATION/TRAINING PROGRAM

## 2021-06-01 PROCEDURE — 25000003 PHARM REV CODE 250: Performed by: STUDENT IN AN ORGANIZED HEALTH CARE EDUCATION/TRAINING PROGRAM

## 2021-06-01 PROCEDURE — 99233 PR SUBSEQUENT HOSPITAL CARE,LEVL III: ICD-10-PCS | Mod: 95,,, | Performed by: INTERNAL MEDICINE

## 2021-06-01 PROCEDURE — 25000003 PHARM REV CODE 250: Performed by: INTERNAL MEDICINE

## 2021-06-01 PROCEDURE — 36415 COLL VENOUS BLD VENIPUNCTURE: CPT | Performed by: STUDENT IN AN ORGANIZED HEALTH CARE EDUCATION/TRAINING PROGRAM

## 2021-06-01 PROCEDURE — 11000001 HC ACUTE MED/SURG PRIVATE ROOM

## 2021-06-01 PROCEDURE — 85025 COMPLETE CBC W/AUTO DIFF WBC: CPT | Performed by: STUDENT IN AN ORGANIZED HEALTH CARE EDUCATION/TRAINING PROGRAM

## 2021-06-01 PROCEDURE — 80069 RENAL FUNCTION PANEL: CPT | Performed by: STUDENT IN AN ORGANIZED HEALTH CARE EDUCATION/TRAINING PROGRAM

## 2021-06-01 RX ORDER — HEPARIN SODIUM 5000 [USP'U]/ML
7500 INJECTION, SOLUTION INTRAVENOUS; SUBCUTANEOUS EVERY 8 HOURS
Status: DISCONTINUED | OUTPATIENT
Start: 2021-06-01 | End: 2021-06-02 | Stop reason: HOSPADM

## 2021-06-01 RX ADMIN — SODIUM BICARBONATE 650 MG TABLET 1300 MG: at 02:06

## 2021-06-01 RX ADMIN — TIZANIDINE 4 MG: 4 TABLET ORAL at 09:06

## 2021-06-01 RX ADMIN — PROPRANOLOL HYDROCHLORIDE 80 MG: 80 CAPSULE, EXTENDED RELEASE ORAL at 08:06

## 2021-06-01 RX ADMIN — CLONIDINE HYDROCHLORIDE 0.3 MG: 0.1 TABLET ORAL at 09:06

## 2021-06-01 RX ADMIN — HYDRALAZINE HYDROCHLORIDE 25 MG: 25 TABLET, FILM COATED ORAL at 02:06

## 2021-06-01 RX ADMIN — HEPARIN SODIUM 5000 UNITS: 5000 INJECTION INTRAVENOUS; SUBCUTANEOUS at 06:06

## 2021-06-01 RX ADMIN — TIZANIDINE 4 MG: 4 TABLET ORAL at 07:06

## 2021-06-01 RX ADMIN — HEPARIN SODIUM 7500 UNITS: 5000 INJECTION INTRAVENOUS; SUBCUTANEOUS at 02:06

## 2021-06-01 RX ADMIN — HEPARIN SODIUM 7500 UNITS: 5000 INJECTION INTRAVENOUS; SUBCUTANEOUS at 09:06

## 2021-06-01 RX ADMIN — CLONIDINE HYDROCHLORIDE 0.3 MG: 0.1 TABLET ORAL at 02:06

## 2021-06-01 RX ADMIN — HYDRALAZINE HYDROCHLORIDE 25 MG: 25 TABLET, FILM COATED ORAL at 07:06

## 2021-06-01 RX ADMIN — HYDRALAZINE HYDROCHLORIDE 25 MG: 25 TABLET, FILM COATED ORAL at 09:06

## 2021-06-01 RX ADMIN — SODIUM BICARBONATE 650 MG TABLET 1300 MG: at 08:06

## 2021-06-01 RX ADMIN — TIZANIDINE 4 MG: 4 TABLET ORAL at 02:06

## 2021-06-01 RX ADMIN — SUMATRIPTAN SUCCINATE 100 MG: 50 TABLET ORAL at 05:06

## 2021-06-01 RX ADMIN — CETIRIZINE HYDROCHLORIDE 5 MG: 5 TABLET ORAL at 09:06

## 2021-06-01 RX ADMIN — LEVOTHYROXINE SODIUM 50 MCG: 50 TABLET ORAL at 07:06

## 2021-06-01 RX ADMIN — AZELASTINE HYDROCHLORIDE 137 MCG: 137 SPRAY, METERED NASAL at 08:06

## 2021-06-01 RX ADMIN — INSULIN DETEMIR 15 UNITS: 100 INJECTION, SOLUTION SUBCUTANEOUS at 10:06

## 2021-06-01 RX ADMIN — AZELASTINE HYDROCHLORIDE 137 MCG: 137 SPRAY, METERED NASAL at 09:06

## 2021-06-01 RX ADMIN — SODIUM BICARBONATE 650 MG TABLET 1300 MG: at 09:06

## 2021-06-01 RX ADMIN — VENLAFAXINE HYDROCHLORIDE 150 MG: 150 CAPSULE, EXTENDED RELEASE ORAL at 08:06

## 2021-06-01 RX ADMIN — ATORVASTATIN CALCIUM 40 MG: 20 TABLET, FILM COATED ORAL at 08:06

## 2021-06-01 RX ADMIN — OXYBUTYNIN CHLORIDE 10 MG: 10 TABLET, EXTENDED RELEASE ORAL at 08:06

## 2021-06-01 RX ADMIN — NIFEDIPINE 60 MG: 60 TABLET, FILM COATED, EXTENDED RELEASE ORAL at 08:06

## 2021-06-01 RX ADMIN — CLONIDINE HYDROCHLORIDE 0.3 MG: 0.1 TABLET ORAL at 08:06

## 2021-06-02 ENCOUNTER — PES CALL (OUTPATIENT)
Dept: ADMINISTRATIVE | Facility: CLINIC | Age: 71
End: 2021-06-02

## 2021-06-02 VITALS
WEIGHT: 283.31 LBS | HEIGHT: 68 IN | HEART RATE: 66 BPM | BODY MASS INDEX: 42.94 KG/M2 | SYSTOLIC BLOOD PRESSURE: 165 MMHG | OXYGEN SATURATION: 98 % | TEMPERATURE: 98 F | RESPIRATION RATE: 17 BRPM | DIASTOLIC BLOOD PRESSURE: 85 MMHG

## 2021-06-02 PROBLEM — N13.30 HYDRONEPHROSIS: Status: RESOLVED | Noted: 2018-05-14 | Resolved: 2021-06-02

## 2021-06-02 PROBLEM — N30.00 ACUTE CYSTITIS: Status: RESOLVED | Noted: 2021-05-21 | Resolved: 2021-06-02

## 2021-06-02 PROBLEM — R06.02 SOB (SHORTNESS OF BREATH): Status: RESOLVED | Noted: 2021-05-14 | Resolved: 2021-06-02

## 2021-06-02 LAB
ALBUMIN SERPL BCP-MCNC: 2.1 G/DL (ref 3.5–5.2)
ANION GAP SERPL CALC-SCNC: 10 MMOL/L (ref 8–16)
BASOPHILS # BLD AUTO: 0.05 K/UL (ref 0–0.2)
BASOPHILS NFR BLD: 0.9 % (ref 0–1.9)
BUN SERPL-MCNC: 42 MG/DL (ref 8–23)
CALCIUM SERPL-MCNC: 8.7 MG/DL (ref 8.7–10.5)
CHLORIDE SERPL-SCNC: 99 MMOL/L (ref 95–110)
CO2 SERPL-SCNC: 25 MMOL/L (ref 23–29)
CREAT SERPL-MCNC: 4.3 MG/DL (ref 0.5–1.4)
DIFFERENTIAL METHOD: ABNORMAL
EOSINOPHIL # BLD AUTO: 0.3 K/UL (ref 0–0.5)
EOSINOPHIL NFR BLD: 4.4 % (ref 0–8)
ERYTHROCYTE [DISTWIDTH] IN BLOOD BY AUTOMATED COUNT: 12.6 % (ref 11.5–14.5)
EST. GFR  (AFRICAN AMERICAN): 11.5 ML/MIN/1.73 M^2
EST. GFR  (NON AFRICAN AMERICAN): 9.9 ML/MIN/1.73 M^2
GLUCOSE SERPL-MCNC: 148 MG/DL (ref 70–110)
HCT VFR BLD AUTO: 25 % (ref 37–48.5)
HGB BLD-MCNC: 8 G/DL (ref 12–16)
IMM GRANULOCYTES # BLD AUTO: 0.02 K/UL (ref 0–0.04)
IMM GRANULOCYTES NFR BLD AUTO: 0.4 % (ref 0–0.5)
LYMPHOCYTES # BLD AUTO: 1.6 K/UL (ref 1–4.8)
LYMPHOCYTES NFR BLD: 27.9 % (ref 18–48)
MAGNESIUM SERPL-MCNC: 1.7 MG/DL (ref 1.6–2.6)
MCH RBC QN AUTO: 28 PG (ref 27–31)
MCHC RBC AUTO-ENTMCNC: 32 G/DL (ref 32–36)
MCV RBC AUTO: 87 FL (ref 82–98)
MONOCYTES # BLD AUTO: 0.5 K/UL (ref 0.3–1)
MONOCYTES NFR BLD: 9.3 % (ref 4–15)
NEUTROPHILS # BLD AUTO: 3.2 K/UL (ref 1.8–7.7)
NEUTROPHILS NFR BLD: 57.1 % (ref 38–73)
NRBC BLD-RTO: 0 /100 WBC
PHOSPHATE SERPL-MCNC: 6.5 MG/DL (ref 2.7–4.5)
PLATELET # BLD AUTO: 353 K/UL (ref 150–450)
PMV BLD AUTO: 9.5 FL (ref 9.2–12.9)
POCT GLUCOSE: 147 MG/DL (ref 70–110)
POCT GLUCOSE: 167 MG/DL (ref 70–110)
POCT GLUCOSE: 175 MG/DL (ref 70–110)
POTASSIUM SERPL-SCNC: 4.4 MMOL/L (ref 3.5–5.1)
RBC # BLD AUTO: 2.86 M/UL (ref 4–5.4)
SODIUM SERPL-SCNC: 134 MMOL/L (ref 136–145)
WBC # BLD AUTO: 5.62 K/UL (ref 3.9–12.7)

## 2021-06-02 PROCEDURE — 83735 ASSAY OF MAGNESIUM: CPT | Performed by: STUDENT IN AN ORGANIZED HEALTH CARE EDUCATION/TRAINING PROGRAM

## 2021-06-02 PROCEDURE — 97530 THERAPEUTIC ACTIVITIES: CPT

## 2021-06-02 PROCEDURE — 25000003 PHARM REV CODE 250: Performed by: PHYSICIAN ASSISTANT

## 2021-06-02 PROCEDURE — 25000003 PHARM REV CODE 250: Performed by: STUDENT IN AN ORGANIZED HEALTH CARE EDUCATION/TRAINING PROGRAM

## 2021-06-02 PROCEDURE — 99239 PR HOSPITAL DISCHARGE DAY,>30 MIN: ICD-10-PCS | Mod: 95,,, | Performed by: INTERNAL MEDICINE

## 2021-06-02 PROCEDURE — 80069 RENAL FUNCTION PANEL: CPT | Performed by: STUDENT IN AN ORGANIZED HEALTH CARE EDUCATION/TRAINING PROGRAM

## 2021-06-02 PROCEDURE — 25000003 PHARM REV CODE 250: Performed by: INTERNAL MEDICINE

## 2021-06-02 PROCEDURE — 97116 GAIT TRAINING THERAPY: CPT

## 2021-06-02 PROCEDURE — 63600175 PHARM REV CODE 636 W HCPCS: Performed by: INTERNAL MEDICINE

## 2021-06-02 PROCEDURE — 36415 COLL VENOUS BLD VENIPUNCTURE: CPT | Performed by: STUDENT IN AN ORGANIZED HEALTH CARE EDUCATION/TRAINING PROGRAM

## 2021-06-02 PROCEDURE — 85025 COMPLETE CBC W/AUTO DIFF WBC: CPT | Performed by: STUDENT IN AN ORGANIZED HEALTH CARE EDUCATION/TRAINING PROGRAM

## 2021-06-02 PROCEDURE — 99239 HOSP IP/OBS DSCHRG MGMT >30: CPT | Mod: 95,,, | Performed by: INTERNAL MEDICINE

## 2021-06-02 RX ORDER — AZELASTINE 1 MG/ML
1 SPRAY, METERED NASAL 2 TIMES DAILY PRN
Start: 2021-06-02 | End: 2021-06-30

## 2021-06-02 RX ORDER — SCOLOPAMINE TRANSDERMAL SYSTEM 1 MG/1
1 PATCH, EXTENDED RELEASE TRANSDERMAL
Status: ON HOLD
Start: 2021-06-02 | End: 2021-07-06 | Stop reason: HOSPADM

## 2021-06-02 RX ORDER — INSULIN GLARGINE 100 [IU]/ML
20 INJECTION, SOLUTION SUBCUTANEOUS DAILY
Status: ON HOLD
Start: 2021-06-02 | End: 2021-09-07 | Stop reason: SDUPTHER

## 2021-06-02 RX ORDER — CLONIDINE HYDROCHLORIDE 0.3 MG/1
0.3 TABLET ORAL 3 TIMES DAILY
Qty: 90 TABLET | Refills: 11 | Status: SHIPPED | OUTPATIENT
Start: 2021-06-02 | End: 2021-06-09

## 2021-06-02 RX ORDER — SODIUM BICARBONATE 650 MG/1
1300 TABLET ORAL 3 TIMES DAILY
Qty: 180 TABLET | Refills: 11 | Status: ON HOLD | OUTPATIENT
Start: 2021-06-02 | End: 2021-12-29 | Stop reason: HOSPADM

## 2021-06-02 RX ORDER — HYDRALAZINE HYDROCHLORIDE 50 MG/1
50 TABLET, FILM COATED ORAL EVERY 8 HOURS
Qty: 90 TABLET | Refills: 11 | Status: ON HOLD | OUTPATIENT
Start: 2021-06-02 | End: 2021-07-06 | Stop reason: HOSPADM

## 2021-06-02 RX ADMIN — INSULIN ASPART 4 UNITS: 100 INJECTION, SOLUTION INTRAVENOUS; SUBCUTANEOUS at 01:06

## 2021-06-02 RX ADMIN — ATORVASTATIN CALCIUM 40 MG: 20 TABLET, FILM COATED ORAL at 10:06

## 2021-06-02 RX ADMIN — CLONIDINE HYDROCHLORIDE 0.3 MG: 0.1 TABLET ORAL at 10:06

## 2021-06-02 RX ADMIN — OXYCODONE AND ACETAMINOPHEN 1 TABLET: 10; 325 TABLET ORAL at 07:06

## 2021-06-02 RX ADMIN — HEPARIN SODIUM 7500 UNITS: 5000 INJECTION INTRAVENOUS; SUBCUTANEOUS at 06:06

## 2021-06-02 RX ADMIN — SUMATRIPTAN SUCCINATE 100 MG: 50 TABLET ORAL at 10:06

## 2021-06-02 RX ADMIN — OXYBUTYNIN CHLORIDE 10 MG: 10 TABLET, EXTENDED RELEASE ORAL at 10:06

## 2021-06-02 RX ADMIN — NIFEDIPINE 60 MG: 60 TABLET, FILM COATED, EXTENDED RELEASE ORAL at 10:06

## 2021-06-02 RX ADMIN — CLONIDINE HYDROCHLORIDE 0.3 MG: 0.1 TABLET ORAL at 05:06

## 2021-06-02 RX ADMIN — LEVOTHYROXINE SODIUM 50 MCG: 50 TABLET ORAL at 06:06

## 2021-06-02 RX ADMIN — TIZANIDINE 4 MG: 4 TABLET ORAL at 06:06

## 2021-06-02 RX ADMIN — AZELASTINE HYDROCHLORIDE 137 MCG: 137 SPRAY, METERED NASAL at 10:06

## 2021-06-02 RX ADMIN — TIZANIDINE 4 MG: 4 TABLET ORAL at 01:06

## 2021-06-02 RX ADMIN — OXYCODONE AND ACETAMINOPHEN 1 TABLET: 10; 325 TABLET ORAL at 01:06

## 2021-06-02 RX ADMIN — HYDRALAZINE HYDROCHLORIDE 25 MG: 25 TABLET, FILM COATED ORAL at 01:06

## 2021-06-02 RX ADMIN — VENLAFAXINE HYDROCHLORIDE 150 MG: 150 CAPSULE, EXTENDED RELEASE ORAL at 10:06

## 2021-06-02 RX ADMIN — HEPARIN SODIUM 7500 UNITS: 5000 INJECTION INTRAVENOUS; SUBCUTANEOUS at 05:06

## 2021-06-02 RX ADMIN — PROPRANOLOL HYDROCHLORIDE 80 MG: 80 CAPSULE, EXTENDED RELEASE ORAL at 10:06

## 2021-06-02 RX ADMIN — SODIUM BICARBONATE 650 MG TABLET 1300 MG: at 10:06

## 2021-06-02 RX ADMIN — HYDRALAZINE HYDROCHLORIDE 25 MG: 25 TABLET, FILM COATED ORAL at 06:06

## 2021-06-02 RX ADMIN — SODIUM BICARBONATE 650 MG TABLET 1300 MG: at 05:06

## 2021-06-03 ENCOUNTER — PES CALL (OUTPATIENT)
Dept: ADMINISTRATIVE | Facility: CLINIC | Age: 71
End: 2021-06-03

## 2021-06-03 ENCOUNTER — PATIENT OUTREACH (OUTPATIENT)
Dept: ADMINISTRATIVE | Facility: CLINIC | Age: 71
End: 2021-06-03

## 2021-06-03 ENCOUNTER — PATIENT MESSAGE (OUTPATIENT)
Dept: ADMINISTRATIVE | Facility: OTHER | Age: 71
End: 2021-06-03

## 2021-06-03 ENCOUNTER — TELEPHONE (OUTPATIENT)
Dept: NEPHROLOGY | Facility: CLINIC | Age: 71
End: 2021-06-03

## 2021-06-03 DIAGNOSIS — N17.9 AKI (ACUTE KIDNEY INJURY): Primary | ICD-10-CM

## 2021-06-03 DIAGNOSIS — N18.4 CKD (CHRONIC KIDNEY DISEASE) STAGE 4, GFR 15-29 ML/MIN: Primary | ICD-10-CM

## 2021-06-04 ENCOUNTER — TELEPHONE (OUTPATIENT)
Dept: NEPHROLOGY | Facility: CLINIC | Age: 71
End: 2021-06-04

## 2021-06-04 ENCOUNTER — PATIENT MESSAGE (OUTPATIENT)
Dept: ADMINISTRATIVE | Facility: OTHER | Age: 71
End: 2021-06-04

## 2021-06-05 ENCOUNTER — PATIENT MESSAGE (OUTPATIENT)
Dept: NEPHROLOGY | Facility: CLINIC | Age: 71
End: 2021-06-05

## 2021-06-05 ENCOUNTER — LAB VISIT (OUTPATIENT)
Dept: LAB | Facility: HOSPITAL | Age: 71
End: 2021-06-05
Attending: INTERNAL MEDICINE
Payer: MEDICARE

## 2021-06-05 DIAGNOSIS — N17.9 ACUTE KIDNEY FAILURE, UNSPECIFIED: Primary | ICD-10-CM

## 2021-06-05 LAB
ANION GAP SERPL CALC-SCNC: 12 MMOL/L (ref 8–16)
BUN SERPL-MCNC: 26 MG/DL (ref 8–23)
CALCIUM SERPL-MCNC: 8.9 MG/DL (ref 8.7–10.5)
CHLORIDE SERPL-SCNC: 106 MMOL/L (ref 95–110)
CO2 SERPL-SCNC: 22 MMOL/L (ref 23–29)
CREAT SERPL-MCNC: 3.3 MG/DL (ref 0.5–1.4)
EST. GFR  (AFRICAN AMERICAN): 15.8 ML/MIN/1.73 M^2
EST. GFR  (NON AFRICAN AMERICAN): 13.7 ML/MIN/1.73 M^2
GLUCOSE SERPL-MCNC: 168 MG/DL (ref 70–110)
POTASSIUM SERPL-SCNC: 4 MMOL/L (ref 3.5–5.1)
SODIUM SERPL-SCNC: 140 MMOL/L (ref 136–145)

## 2021-06-05 PROCEDURE — 80048 BASIC METABOLIC PNL TOTAL CA: CPT | Performed by: INTERNAL MEDICINE

## 2021-06-05 PROCEDURE — G0180 MD CERTIFICATION HHA PATIENT: HCPCS | Mod: ,,, | Performed by: INTERNAL MEDICINE

## 2021-06-05 PROCEDURE — G0180 PR HOME HEALTH MD CERTIFICATION: ICD-10-PCS | Mod: ,,, | Performed by: INTERNAL MEDICINE

## 2021-06-07 ENCOUNTER — TELEPHONE (OUTPATIENT)
Dept: NEPHROLOGY | Facility: CLINIC | Age: 71
End: 2021-06-07

## 2021-06-07 ENCOUNTER — TELEPHONE (OUTPATIENT)
Dept: INTERNAL MEDICINE | Facility: CLINIC | Age: 71
End: 2021-06-07

## 2021-06-08 ENCOUNTER — PATIENT MESSAGE (OUTPATIENT)
Dept: PHYSICAL MEDICINE AND REHAB | Facility: CLINIC | Age: 71
End: 2021-06-08

## 2021-06-08 DIAGNOSIS — G89.29 CHRONIC NECK PAIN: ICD-10-CM

## 2021-06-08 DIAGNOSIS — M54.2 CHRONIC NECK PAIN: ICD-10-CM

## 2021-06-08 DIAGNOSIS — M79.7 FIBROMYALGIA: ICD-10-CM

## 2021-06-08 DIAGNOSIS — M54.50 CHRONIC MIDLINE LOW BACK PAIN WITHOUT SCIATICA: ICD-10-CM

## 2021-06-08 DIAGNOSIS — G89.29 CHRONIC MIDLINE LOW BACK PAIN WITHOUT SCIATICA: ICD-10-CM

## 2021-06-09 ENCOUNTER — TELEPHONE (OUTPATIENT)
Dept: INTERNAL MEDICINE | Facility: CLINIC | Age: 71
End: 2021-06-09

## 2021-06-09 ENCOUNTER — TELEPHONE (OUTPATIENT)
Dept: ADMINISTRATIVE | Facility: CLINIC | Age: 71
End: 2021-06-09

## 2021-06-09 ENCOUNTER — HOSPITAL ENCOUNTER (EMERGENCY)
Facility: HOSPITAL | Age: 71
Discharge: HOME | End: 2021-06-10
Attending: EMERGENCY MEDICINE
Payer: MEDICARE

## 2021-06-09 DIAGNOSIS — I10 HYPERTENSION, UNSPECIFIED TYPE: ICD-10-CM

## 2021-06-09 DIAGNOSIS — R33.9 URINARY RETENTION: Primary | ICD-10-CM

## 2021-06-09 LAB
ALBUMIN SERPL BCP-MCNC: 2.7 G/DL (ref 3.5–5.2)
ALP SERPL-CCNC: 197 U/L (ref 55–135)
ALT SERPL W/O P-5'-P-CCNC: 9 U/L (ref 10–44)
ANION GAP SERPL CALC-SCNC: 11 MMOL/L (ref 8–16)
AST SERPL-CCNC: 11 U/L (ref 10–40)
BASOPHILS # BLD AUTO: 0.07 K/UL (ref 0–0.2)
BASOPHILS NFR BLD: 0.9 % (ref 0–1.9)
BILIRUB SERPL-MCNC: 0.3 MG/DL (ref 0.1–1)
BUN SERPL-MCNC: 18 MG/DL (ref 8–23)
CALCIUM SERPL-MCNC: 8.9 MG/DL (ref 8.7–10.5)
CHLORIDE SERPL-SCNC: 107 MMOL/L (ref 95–110)
CO2 SERPL-SCNC: 18 MMOL/L (ref 23–29)
CREAT SERPL-MCNC: 3 MG/DL (ref 0.5–1.4)
DIFFERENTIAL METHOD: ABNORMAL
EOSINOPHIL # BLD AUTO: 0.3 K/UL (ref 0–0.5)
EOSINOPHIL NFR BLD: 3.3 % (ref 0–8)
ERYTHROCYTE [DISTWIDTH] IN BLOOD BY AUTOMATED COUNT: 14.3 % (ref 11.5–14.5)
EST. GFR  (AFRICAN AMERICAN): 17.7 ML/MIN/1.73 M^2
EST. GFR  (NON AFRICAN AMERICAN): 15.4 ML/MIN/1.73 M^2
GLUCOSE SERPL-MCNC: 176 MG/DL (ref 70–110)
HCT VFR BLD AUTO: 29.6 % (ref 37–48.5)
HGB BLD-MCNC: 9.7 G/DL (ref 12–16)
IMM GRANULOCYTES # BLD AUTO: 0.04 K/UL (ref 0–0.04)
IMM GRANULOCYTES NFR BLD AUTO: 0.5 % (ref 0–0.5)
LYMPHOCYTES # BLD AUTO: 1.8 K/UL (ref 1–4.8)
LYMPHOCYTES NFR BLD: 22.6 % (ref 18–48)
MCH RBC QN AUTO: 28.4 PG (ref 27–31)
MCHC RBC AUTO-ENTMCNC: 32.8 G/DL (ref 32–36)
MCV RBC AUTO: 87 FL (ref 82–98)
MONOCYTES # BLD AUTO: 0.8 K/UL (ref 0.3–1)
MONOCYTES NFR BLD: 10 % (ref 4–15)
NEUTROPHILS # BLD AUTO: 5.1 K/UL (ref 1.8–7.7)
NEUTROPHILS NFR BLD: 62.7 % (ref 38–73)
NRBC BLD-RTO: 0 /100 WBC
PLATELET # BLD AUTO: 326 K/UL (ref 150–450)
PMV BLD AUTO: 8.9 FL (ref 9.2–12.9)
POTASSIUM SERPL-SCNC: 4 MMOL/L (ref 3.5–5.1)
PROT SERPL-MCNC: 6.7 G/DL (ref 6–8.4)
RBC # BLD AUTO: 3.41 M/UL (ref 4–5.4)
SODIUM SERPL-SCNC: 136 MMOL/L (ref 136–145)
WBC # BLD AUTO: 8.13 K/UL (ref 3.9–12.7)

## 2021-06-09 PROCEDURE — 25000003 PHARM REV CODE 250: Performed by: EMERGENCY MEDICINE

## 2021-06-09 PROCEDURE — 99284 EMERGENCY DEPT VISIT MOD MDM: CPT | Mod: ,,, | Performed by: EMERGENCY MEDICINE

## 2021-06-09 PROCEDURE — 85025 COMPLETE CBC W/AUTO DIFF WBC: CPT | Performed by: EMERGENCY MEDICINE

## 2021-06-09 PROCEDURE — 99283 EMERGENCY DEPT VISIT LOW MDM: CPT

## 2021-06-09 PROCEDURE — 99284 PR EMERGENCY DEPT VISIT,LEVEL IV: ICD-10-PCS | Mod: ,,, | Performed by: EMERGENCY MEDICINE

## 2021-06-09 PROCEDURE — 80053 COMPREHEN METABOLIC PANEL: CPT | Performed by: EMERGENCY MEDICINE

## 2021-06-09 RX ORDER — CLONIDINE HYDROCHLORIDE 0.1 MG/1
0.2 TABLET ORAL
Status: COMPLETED | OUTPATIENT
Start: 2021-06-09 | End: 2021-06-09

## 2021-06-09 RX ORDER — CLONIDINE HYDROCHLORIDE 0.1 MG/1
0.2 TABLET ORAL 3 TIMES DAILY
Qty: 180 TABLET | Refills: 0 | Status: SHIPPED | OUTPATIENT
Start: 2021-06-09 | End: 2021-06-09 | Stop reason: SDUPTHER

## 2021-06-09 RX ORDER — AMLODIPINE BESYLATE 10 MG/1
10 TABLET ORAL
Status: COMPLETED | OUTPATIENT
Start: 2021-06-09 | End: 2021-06-09

## 2021-06-09 RX ORDER — HYDROCODONE BITARTRATE AND ACETAMINOPHEN 10; 325 MG/1; MG/1
1 TABLET ORAL EVERY 6 HOURS PRN
Qty: 120 TABLET | Refills: 0 | Status: SHIPPED | OUTPATIENT
Start: 2021-06-09 | End: 2021-08-20 | Stop reason: SDUPTHER

## 2021-06-09 RX ORDER — CLONIDINE HYDROCHLORIDE 0.1 MG/1
0.2 TABLET ORAL 3 TIMES DAILY
Qty: 180 TABLET | Refills: 0 | Status: ON HOLD | OUTPATIENT
Start: 2021-06-09 | End: 2021-07-05

## 2021-06-09 RX ADMIN — AMLODIPINE BESYLATE 10 MG: 10 TABLET ORAL at 04:06

## 2021-06-09 RX ADMIN — CLONIDINE HYDROCHLORIDE 0.2 MG: 0.1 TABLET ORAL at 08:06

## 2021-06-10 VITALS
OXYGEN SATURATION: 99 % | SYSTOLIC BLOOD PRESSURE: 182 MMHG | TEMPERATURE: 99 F | BODY MASS INDEX: 43.33 KG/M2 | WEIGHT: 285 LBS | RESPIRATION RATE: 18 BRPM | DIASTOLIC BLOOD PRESSURE: 81 MMHG | HEART RATE: 87 BPM

## 2021-06-11 ENCOUNTER — TELEPHONE (OUTPATIENT)
Dept: NEPHROLOGY | Facility: CLINIC | Age: 71
End: 2021-06-11

## 2021-06-15 ENCOUNTER — TELEPHONE (OUTPATIENT)
Dept: INTERNAL MEDICINE | Facility: CLINIC | Age: 71
End: 2021-06-15

## 2021-06-15 ENCOUNTER — PES CALL (OUTPATIENT)
Dept: ADMINISTRATIVE | Facility: CLINIC | Age: 71
End: 2021-06-15

## 2021-06-16 ENCOUNTER — OUTPATIENT CASE MANAGEMENT (OUTPATIENT)
Dept: ADMINISTRATIVE | Facility: OTHER | Age: 71
End: 2021-06-16

## 2021-06-16 ENCOUNTER — TELEPHONE (OUTPATIENT)
Dept: INTERNAL MEDICINE | Facility: CLINIC | Age: 71
End: 2021-06-16

## 2021-06-16 ENCOUNTER — LAB VISIT (OUTPATIENT)
Dept: LAB | Facility: HOSPITAL | Age: 71
End: 2021-06-16
Attending: INTERNAL MEDICINE
Payer: MEDICARE

## 2021-06-16 DIAGNOSIS — N17.9 ACUTE KIDNEY FAILURE, UNSPECIFIED: Primary | ICD-10-CM

## 2021-06-16 DIAGNOSIS — R53.81 PHYSICAL DEBILITY: Primary | ICD-10-CM

## 2021-06-16 LAB
ANION GAP SERPL CALC-SCNC: 11 MMOL/L (ref 8–16)
BUN SERPL-MCNC: 25 MG/DL (ref 8–23)
CALCIUM SERPL-MCNC: 8.7 MG/DL (ref 8.7–10.5)
CHLORIDE SERPL-SCNC: 108 MMOL/L (ref 95–110)
CO2 SERPL-SCNC: 16 MMOL/L (ref 23–29)
CREAT SERPL-MCNC: 2.6 MG/DL (ref 0.5–1.4)
EST. GFR  (AFRICAN AMERICAN): 21.1 ML/MIN/1.73 M^2
EST. GFR  (NON AFRICAN AMERICAN): 18.3 ML/MIN/1.73 M^2
GLUCOSE SERPL-MCNC: 234 MG/DL (ref 70–110)
POTASSIUM SERPL-SCNC: 4.2 MMOL/L (ref 3.5–5.1)
SODIUM SERPL-SCNC: 135 MMOL/L (ref 136–145)

## 2021-06-16 PROCEDURE — 80048 BASIC METABOLIC PNL TOTAL CA: CPT | Performed by: INTERNAL MEDICINE

## 2021-06-17 ENCOUNTER — TELEPHONE (OUTPATIENT)
Dept: INTERNAL MEDICINE | Facility: CLINIC | Age: 71
End: 2021-06-17

## 2021-06-17 ENCOUNTER — DOCUMENT SCAN (OUTPATIENT)
Dept: HOME HEALTH SERVICES | Facility: HOSPITAL | Age: 71
End: 2021-06-17
Payer: COMMERCIAL

## 2021-06-18 ENCOUNTER — LAB VISIT (OUTPATIENT)
Dept: LAB | Facility: HOSPITAL | Age: 71
End: 2021-06-18
Attending: INTERNAL MEDICINE
Payer: MEDICARE

## 2021-06-18 DIAGNOSIS — N31.9 NEUROGENIC DYSFUNCTION OF THE URINARY BLADDER: Primary | ICD-10-CM

## 2021-06-18 DIAGNOSIS — N39.0 URINARY TRACT INFECTION, SITE NOT SPECIFIED: ICD-10-CM

## 2021-06-18 LAB
BACTERIA #/AREA URNS AUTO: ABNORMAL /HPF
BILIRUB UR QL STRIP: NEGATIVE
CLARITY UR REFRACT.AUTO: ABNORMAL
COLOR UR AUTO: ABNORMAL
GLUCOSE UR QL STRIP: NEGATIVE
HGB UR QL STRIP: ABNORMAL
HYALINE CASTS UR QL AUTO: 0 /LPF
KETONES UR QL STRIP: NEGATIVE
LEUKOCYTE ESTERASE UR QL STRIP: ABNORMAL
MICROSCOPIC COMMENT: ABNORMAL
NITRITE UR QL STRIP: NEGATIVE
PH UR STRIP: 5 [PH] (ref 5–8)
PROT UR QL STRIP: ABNORMAL
RBC #/AREA URNS AUTO: >100 /HPF (ref 0–4)
SP GR UR STRIP: 1.01 (ref 1–1.03)
URN SPEC COLLECT METH UR: ABNORMAL
WBC #/AREA URNS AUTO: >100 /HPF (ref 0–5)
WBC CLUMPS UR QL AUTO: ABNORMAL

## 2021-06-18 PROCEDURE — 87088 URINE BACTERIA CULTURE: CPT | Performed by: INTERNAL MEDICINE

## 2021-06-18 PROCEDURE — 87086 URINE CULTURE/COLONY COUNT: CPT | Performed by: INTERNAL MEDICINE

## 2021-06-18 PROCEDURE — 87186 SC STD MICRODIL/AGAR DIL: CPT | Performed by: INTERNAL MEDICINE

## 2021-06-18 PROCEDURE — 87077 CULTURE AEROBIC IDENTIFY: CPT | Performed by: INTERNAL MEDICINE

## 2021-06-18 PROCEDURE — 81001 URINALYSIS AUTO W/SCOPE: CPT | Performed by: INTERNAL MEDICINE

## 2021-06-20 ENCOUNTER — PATIENT MESSAGE (OUTPATIENT)
Dept: NEPHROLOGY | Facility: CLINIC | Age: 71
End: 2021-06-20

## 2021-06-20 DIAGNOSIS — N18.4 CKD (CHRONIC KIDNEY DISEASE) STAGE 4, GFR 15-29 ML/MIN: Primary | ICD-10-CM

## 2021-06-20 LAB — BACTERIA UR CULT: ABNORMAL

## 2021-06-21 ENCOUNTER — TELEPHONE (OUTPATIENT)
Dept: INTERNAL MEDICINE | Facility: CLINIC | Age: 71
End: 2021-06-21

## 2021-06-21 RX ORDER — LEVOFLOXACIN 500 MG/1
500 TABLET, FILM COATED ORAL EVERY OTHER DAY
Qty: 5 TABLET | Refills: 0 | Status: ON HOLD | OUTPATIENT
Start: 2021-06-21 | End: 2021-07-06 | Stop reason: HOSPADM

## 2021-06-23 ENCOUNTER — LAB VISIT (OUTPATIENT)
Dept: LAB | Facility: HOSPITAL | Age: 71
End: 2021-06-23
Attending: INTERNAL MEDICINE
Payer: MEDICARE

## 2021-06-23 DIAGNOSIS — N18.4 CKD (CHRONIC KIDNEY DISEASE) STAGE 4, GFR 15-29 ML/MIN: ICD-10-CM

## 2021-06-23 LAB
ANION GAP SERPL CALC-SCNC: 9 MMOL/L (ref 8–16)
BUN SERPL-MCNC: 23 MG/DL (ref 8–23)
CALCIUM SERPL-MCNC: 9 MG/DL (ref 8.7–10.5)
CHLORIDE SERPL-SCNC: 107 MMOL/L (ref 95–110)
CO2 SERPL-SCNC: 18 MMOL/L (ref 23–29)
CREAT SERPL-MCNC: 2.4 MG/DL (ref 0.5–1.4)
EST. GFR  (AFRICAN AMERICAN): 23.2 ML/MIN/1.73 M^2
EST. GFR  (NON AFRICAN AMERICAN): 20.1 ML/MIN/1.73 M^2
GLUCOSE SERPL-MCNC: 224 MG/DL (ref 70–110)
POTASSIUM SERPL-SCNC: 5.1 MMOL/L (ref 3.5–5.1)
SODIUM SERPL-SCNC: 134 MMOL/L (ref 136–145)

## 2021-06-23 PROCEDURE — 84075 ASSAY ALKALINE PHOSPHATASE: CPT | Performed by: INTERNAL MEDICINE

## 2021-06-23 PROCEDURE — 80069 RENAL FUNCTION PANEL: CPT | Performed by: INTERNAL MEDICINE

## 2021-06-23 PROCEDURE — 36415 COLL VENOUS BLD VENIPUNCTURE: CPT | Performed by: INTERNAL MEDICINE

## 2021-06-23 PROCEDURE — 82247 BILIRUBIN TOTAL: CPT | Performed by: INTERNAL MEDICINE

## 2021-06-23 PROCEDURE — 80048 BASIC METABOLIC PNL TOTAL CA: CPT | Performed by: INTERNAL MEDICINE

## 2021-06-23 PROCEDURE — 84450 TRANSFERASE (AST) (SGOT): CPT | Performed by: INTERNAL MEDICINE

## 2021-06-24 ENCOUNTER — DOCUMENT SCAN (OUTPATIENT)
Dept: HOME HEALTH SERVICES | Facility: HOSPITAL | Age: 71
End: 2021-06-24
Payer: COMMERCIAL

## 2021-06-24 LAB
ALBUMIN SERPL BCP-MCNC: 2.4 G/DL (ref 3.5–5.2)
ALBUMIN SERPL BCP-MCNC: 2.4 G/DL (ref 3.5–5.2)
ALP SERPL-CCNC: 165 U/L (ref 55–135)
ALT SERPL W/O P-5'-P-CCNC: 11 U/L (ref 10–44)
ANION GAP SERPL CALC-SCNC: 12 MMOL/L (ref 8–16)
AST SERPL-CCNC: 11 U/L (ref 10–40)
BILIRUB DIRECT SERPL-MCNC: 0.1 MG/DL (ref 0.1–0.3)
BILIRUB SERPL-MCNC: 0.2 MG/DL (ref 0.1–1)
BUN SERPL-MCNC: 23 MG/DL (ref 8–23)
CALCIUM SERPL-MCNC: 9 MG/DL (ref 8.7–10.5)
CHLORIDE SERPL-SCNC: 107 MMOL/L (ref 95–110)
CO2 SERPL-SCNC: 15 MMOL/L (ref 23–29)
CREAT SERPL-MCNC: 2.4 MG/DL (ref 0.5–1.4)
EST. GFR  (AFRICAN AMERICAN): 23.2 ML/MIN/1.73 M^2
EST. GFR  (NON AFRICAN AMERICAN): 20.1 ML/MIN/1.73 M^2
GLUCOSE SERPL-MCNC: 216 MG/DL (ref 70–110)
PHOSPHATE SERPL-MCNC: 3.6 MG/DL (ref 2.7–4.5)
POTASSIUM SERPL-SCNC: 5.2 MMOL/L (ref 3.5–5.1)
PROT SERPL-MCNC: 6.3 G/DL (ref 6–8.4)
SODIUM SERPL-SCNC: 134 MMOL/L (ref 136–145)

## 2021-06-28 ENCOUNTER — TELEPHONE (OUTPATIENT)
Dept: UROLOGY | Facility: CLINIC | Age: 71
End: 2021-06-28

## 2021-06-28 DIAGNOSIS — A49.9 BACTERIAL UTI: Primary | ICD-10-CM

## 2021-06-28 DIAGNOSIS — N39.0 BACTERIAL UTI: Primary | ICD-10-CM

## 2021-06-29 ENCOUNTER — TELEPHONE (OUTPATIENT)
Dept: INTERNAL MEDICINE | Facility: CLINIC | Age: 71
End: 2021-06-29

## 2021-06-29 ENCOUNTER — TELEPHONE (OUTPATIENT)
Dept: UROLOGY | Facility: CLINIC | Age: 71
End: 2021-06-29

## 2021-06-29 ENCOUNTER — TELEPHONE (OUTPATIENT)
Dept: NEPHROLOGY | Facility: CLINIC | Age: 71
End: 2021-06-29

## 2021-06-29 DIAGNOSIS — N31.9 NEUROGENIC BLADDER: Primary | ICD-10-CM

## 2021-06-29 DIAGNOSIS — A49.9 BACTERIAL UTI: ICD-10-CM

## 2021-06-29 DIAGNOSIS — N39.0 BACTERIAL UTI: ICD-10-CM

## 2021-06-29 DIAGNOSIS — N32.89 BLADDER SPASMS: ICD-10-CM

## 2021-06-30 ENCOUNTER — TELEPHONE (OUTPATIENT)
Dept: NEPHROLOGY | Facility: CLINIC | Age: 71
End: 2021-06-30

## 2021-06-30 ENCOUNTER — TELEPHONE (OUTPATIENT)
Dept: UROLOGY | Facility: CLINIC | Age: 71
End: 2021-06-30

## 2021-06-30 ENCOUNTER — HOSPITAL ENCOUNTER (INPATIENT)
Facility: HOSPITAL | Age: 71
LOS: 6 days | Discharge: NURSING, REHAB OR LONG-TERM CARE | DRG: 660 | End: 2021-07-07
Attending: EMERGENCY MEDICINE | Admitting: HOSPITALIST
Payer: MEDICARE

## 2021-06-30 ENCOUNTER — PATIENT OUTREACH (OUTPATIENT)
Dept: EMERGENCY MEDICINE | Facility: HOSPITAL | Age: 71
End: 2021-06-30

## 2021-06-30 DIAGNOSIS — R26.9 ABNORMALITY OF GAIT AND MOBILITY: ICD-10-CM

## 2021-06-30 DIAGNOSIS — N12 PYELONEPHRITIS OF LEFT KIDNEY: ICD-10-CM

## 2021-06-30 DIAGNOSIS — R11.2 NAUSEA AND VOMITING: ICD-10-CM

## 2021-06-30 DIAGNOSIS — N20.0 KIDNEY STONE: ICD-10-CM

## 2021-06-30 DIAGNOSIS — R07.9 CHEST PAIN: ICD-10-CM

## 2021-06-30 DIAGNOSIS — N18.4 CKD (CHRONIC KIDNEY DISEASE) STAGE 4, GFR 15-29 ML/MIN: Primary | ICD-10-CM

## 2021-06-30 PROBLEM — N20.1 LEFT URETERAL STONE: Status: ACTIVE | Noted: 2021-06-30

## 2021-06-30 PROBLEM — Z93.6 PRESENCE OF UROSTOMY: Status: RESOLVED | Noted: 2019-09-25 | Resolved: 2021-06-30

## 2021-06-30 LAB
ANION GAP SERPL CALC-SCNC: 10 MMOL/L (ref 8–16)
ANION GAP SERPL CALC-SCNC: 12 MMOL/L (ref 8–16)
BACTERIA #/AREA URNS AUTO: ABNORMAL /HPF
BASOPHILS # BLD AUTO: 0.07 K/UL (ref 0–0.2)
BASOPHILS NFR BLD: 0.8 % (ref 0–1.9)
BILIRUB UR QL STRIP: ABNORMAL
BUN SERPL-MCNC: 25 MG/DL (ref 8–23)
BUN SERPL-MCNC: 25 MG/DL (ref 8–23)
CALCIUM SERPL-MCNC: 8.7 MG/DL (ref 8.7–10.5)
CALCIUM SERPL-MCNC: 9 MG/DL (ref 8.7–10.5)
CHLORIDE SERPL-SCNC: 106 MMOL/L (ref 95–110)
CHLORIDE SERPL-SCNC: 108 MMOL/L (ref 95–110)
CLARITY UR REFRACT.AUTO: ABNORMAL
CO2 SERPL-SCNC: 15 MMOL/L (ref 23–29)
CO2 SERPL-SCNC: 16 MMOL/L (ref 23–29)
COLOR UR AUTO: ABNORMAL
CREAT SERPL-MCNC: 2.4 MG/DL (ref 0.5–1.4)
CREAT SERPL-MCNC: 2.4 MG/DL (ref 0.5–1.4)
DIFFERENTIAL METHOD: ABNORMAL
EOSINOPHIL # BLD AUTO: 0.3 K/UL (ref 0–0.5)
EOSINOPHIL NFR BLD: 3.7 % (ref 0–8)
ERYTHROCYTE [DISTWIDTH] IN BLOOD BY AUTOMATED COUNT: 15 % (ref 11.5–14.5)
EST. GFR  (AFRICAN AMERICAN): 23.2 ML/MIN/1.73 M^2
EST. GFR  (AFRICAN AMERICAN): 23.2 ML/MIN/1.73 M^2
EST. GFR  (NON AFRICAN AMERICAN): 20.1 ML/MIN/1.73 M^2
EST. GFR  (NON AFRICAN AMERICAN): 20.1 ML/MIN/1.73 M^2
GLUCOSE SERPL-MCNC: 122 MG/DL (ref 70–110)
GLUCOSE SERPL-MCNC: 149 MG/DL (ref 70–110)
GLUCOSE UR QL STRIP: NEGATIVE
HCT VFR BLD AUTO: 30.2 % (ref 37–48.5)
HGB BLD-MCNC: 9.3 G/DL (ref 12–16)
HGB UR QL STRIP: ABNORMAL
HYALINE CASTS UR QL AUTO: 0 /LPF
IMM GRANULOCYTES # BLD AUTO: 0.04 K/UL (ref 0–0.04)
IMM GRANULOCYTES NFR BLD AUTO: 0.5 % (ref 0–0.5)
KETONES UR QL STRIP: NEGATIVE
LACTATE SERPL-SCNC: 1 MMOL/L (ref 0.5–2.2)
LEUKOCYTE ESTERASE UR QL STRIP: ABNORMAL
LYMPHOCYTES # BLD AUTO: 1.8 K/UL (ref 1–4.8)
LYMPHOCYTES NFR BLD: 21.4 % (ref 18–48)
MAGNESIUM SERPL-MCNC: 1.6 MG/DL (ref 1.6–2.6)
MCH RBC QN AUTO: 27.8 PG (ref 27–31)
MCHC RBC AUTO-ENTMCNC: 30.8 G/DL (ref 32–36)
MCV RBC AUTO: 90 FL (ref 82–98)
MICROSCOPIC COMMENT: ABNORMAL
MONOCYTES # BLD AUTO: 0.7 K/UL (ref 0.3–1)
MONOCYTES NFR BLD: 8.4 % (ref 4–15)
NEUTROPHILS # BLD AUTO: 5.6 K/UL (ref 1.8–7.7)
NEUTROPHILS NFR BLD: 65.2 % (ref 38–73)
NITRITE UR QL STRIP: POSITIVE
NRBC BLD-RTO: 0 /100 WBC
PH UR STRIP: 6 [PH] (ref 5–8)
PLATELET # BLD AUTO: 409 K/UL (ref 150–450)
PMV BLD AUTO: 9.3 FL (ref 9.2–12.9)
POTASSIUM SERPL-SCNC: 5 MMOL/L (ref 3.5–5.1)
POTASSIUM SERPL-SCNC: 5.4 MMOL/L (ref 3.5–5.1)
PROT UR QL STRIP: ABNORMAL
RBC # BLD AUTO: 3.35 M/UL (ref 4–5.4)
RBC #/AREA URNS AUTO: 60 /HPF (ref 0–4)
SODIUM SERPL-SCNC: 133 MMOL/L (ref 136–145)
SODIUM SERPL-SCNC: 134 MMOL/L (ref 136–145)
SP GR UR STRIP: 1.01 (ref 1–1.03)
URN SPEC COLLECT METH UR: ABNORMAL
WBC # BLD AUTO: 8.58 K/UL (ref 3.9–12.7)
WBC #/AREA URNS AUTO: >100 /HPF (ref 0–5)

## 2021-06-30 PROCEDURE — 96374 THER/PROPH/DIAG INJ IV PUSH: CPT

## 2021-06-30 PROCEDURE — 25000003 PHARM REV CODE 250: Performed by: PHYSICIAN ASSISTANT

## 2021-06-30 PROCEDURE — 63600175 PHARM REV CODE 636 W HCPCS: Performed by: HOSPITALIST

## 2021-06-30 PROCEDURE — 99226 PR SUBSEQUENT OBSERVATION CARE,LEVEL III: CPT | Mod: ,,, | Performed by: HOSPITALIST

## 2021-06-30 PROCEDURE — G0378 HOSPITAL OBSERVATION PER HR: HCPCS

## 2021-06-30 PROCEDURE — 96372 THER/PROPH/DIAG INJ SC/IM: CPT | Mod: 59

## 2021-06-30 PROCEDURE — 80048 BASIC METABOLIC PNL TOTAL CA: CPT | Performed by: PHYSICIAN ASSISTANT

## 2021-06-30 PROCEDURE — 87040 BLOOD CULTURE FOR BACTERIA: CPT | Performed by: HOSPITALIST

## 2021-06-30 PROCEDURE — 96375 TX/PRO/DX INJ NEW DRUG ADDON: CPT

## 2021-06-30 PROCEDURE — 99285 EMERGENCY DEPT VISIT HI MDM: CPT | Mod: 25

## 2021-06-30 PROCEDURE — 83605 ASSAY OF LACTIC ACID: CPT | Performed by: PHYSICIAN ASSISTANT

## 2021-06-30 PROCEDURE — 81001 URINALYSIS AUTO W/SCOPE: CPT | Performed by: PHYSICIAN ASSISTANT

## 2021-06-30 PROCEDURE — 87077 CULTURE AEROBIC IDENTIFY: CPT | Performed by: HOSPITALIST

## 2021-06-30 PROCEDURE — 25000003 PHARM REV CODE 250: Performed by: HOSPITALIST

## 2021-06-30 PROCEDURE — C9399 UNCLASSIFIED DRUGS OR BIOLOG: HCPCS | Performed by: HOSPITALIST

## 2021-06-30 PROCEDURE — 87205 SMEAR GRAM STAIN: CPT | Performed by: PHYSICIAN ASSISTANT

## 2021-06-30 PROCEDURE — 85025 COMPLETE CBC W/AUTO DIFF WBC: CPT | Performed by: PHYSICIAN ASSISTANT

## 2021-06-30 PROCEDURE — 87186 SC STD MICRODIL/AGAR DIL: CPT | Mod: 59 | Performed by: HOSPITALIST

## 2021-06-30 PROCEDURE — 83735 ASSAY OF MAGNESIUM: CPT | Performed by: HOSPITALIST

## 2021-06-30 PROCEDURE — 99285 EMERGENCY DEPT VISIT HI MDM: CPT | Mod: ,,, | Performed by: PHYSICIAN ASSISTANT

## 2021-06-30 PROCEDURE — 96372 THER/PROPH/DIAG INJ SC/IM: CPT

## 2021-06-30 PROCEDURE — 80048 BASIC METABOLIC PNL TOTAL CA: CPT | Mod: 91 | Performed by: HOSPITALIST

## 2021-06-30 PROCEDURE — 63600175 PHARM REV CODE 636 W HCPCS: Performed by: PHYSICIAN ASSISTANT

## 2021-06-30 PROCEDURE — 99285 PR EMERGENCY DEPT VISIT,LEVEL V: ICD-10-PCS | Mod: ,,, | Performed by: PHYSICIAN ASSISTANT

## 2021-06-30 PROCEDURE — 99226 PR SUBSEQUENT OBSERVATION CARE,LEVEL III: ICD-10-PCS | Mod: ,,, | Performed by: HOSPITALIST

## 2021-06-30 PROCEDURE — 99214 PR OFFICE/OUTPT VISIT, EST, LEVL IV, 30-39 MIN: ICD-10-PCS | Mod: GC,,, | Performed by: UROLOGY

## 2021-06-30 PROCEDURE — 99214 OFFICE O/P EST MOD 30 MIN: CPT | Mod: GC,,, | Performed by: UROLOGY

## 2021-06-30 PROCEDURE — 87086 URINE CULTURE/COLONY COUNT: CPT | Performed by: PHYSICIAN ASSISTANT

## 2021-06-30 PROCEDURE — 96361 HYDRATE IV INFUSION ADD-ON: CPT

## 2021-06-30 RX ORDER — ACETAMINOPHEN 325 MG/1
650 TABLET ORAL EVERY 4 HOURS PRN
Status: DISCONTINUED | OUTPATIENT
Start: 2021-06-30 | End: 2021-07-07 | Stop reason: HOSPADM

## 2021-06-30 RX ORDER — ONDANSETRON 2 MG/ML
4 INJECTION INTRAMUSCULAR; INTRAVENOUS EVERY 8 HOURS PRN
Status: DISCONTINUED | OUTPATIENT
Start: 2021-06-30 | End: 2021-07-07 | Stop reason: HOSPADM

## 2021-06-30 RX ORDER — VENLAFAXINE HYDROCHLORIDE 75 MG/1
150 CAPSULE, EXTENDED RELEASE ORAL DAILY
Status: DISCONTINUED | OUTPATIENT
Start: 2021-07-01 | End: 2021-07-06

## 2021-06-30 RX ORDER — SODIUM CHLORIDE 0.9 % (FLUSH) 0.9 %
10 SYRINGE (ML) INJECTION EVERY 6 HOURS PRN
Status: DISCONTINUED | OUTPATIENT
Start: 2021-06-30 | End: 2021-07-07 | Stop reason: HOSPADM

## 2021-06-30 RX ORDER — MICONAZOLE NITRATE 2 %
POWDER (GRAM) TOPICAL 2 TIMES DAILY
Status: DISCONTINUED | OUTPATIENT
Start: 2021-06-30 | End: 2021-07-07 | Stop reason: HOSPADM

## 2021-06-30 RX ORDER — TALC
6 POWDER (GRAM) TOPICAL NIGHTLY PRN
Status: CANCELLED | OUTPATIENT
Start: 2021-06-30

## 2021-06-30 RX ORDER — HYDROCODONE BITARTRATE AND ACETAMINOPHEN 10; 325 MG/1; MG/1
1 TABLET ORAL
Status: COMPLETED | OUTPATIENT
Start: 2021-06-30 | End: 2021-06-30

## 2021-06-30 RX ORDER — POLYETHYLENE GLYCOL 3350 17 G/17G
17 POWDER, FOR SOLUTION ORAL 2 TIMES DAILY PRN
Status: DISCONTINUED | OUTPATIENT
Start: 2021-06-30 | End: 2021-07-07 | Stop reason: HOSPADM

## 2021-06-30 RX ORDER — TRAZODONE HYDROCHLORIDE 100 MG/1
100 TABLET ORAL NIGHTLY
Status: DISCONTINUED | OUTPATIENT
Start: 2021-06-30 | End: 2021-07-07 | Stop reason: HOSPADM

## 2021-06-30 RX ORDER — TALC
6 POWDER (GRAM) TOPICAL NIGHTLY PRN
Status: DISCONTINUED | OUTPATIENT
Start: 2021-06-30 | End: 2021-07-07 | Stop reason: HOSPADM

## 2021-06-30 RX ORDER — GEMFIBROZIL 600 MG/1
600 TABLET, FILM COATED ORAL
Status: DISCONTINUED | OUTPATIENT
Start: 2021-06-30 | End: 2021-07-07 | Stop reason: HOSPADM

## 2021-06-30 RX ORDER — LANOLIN ALCOHOL/MO/W.PET/CERES
400 CREAM (GRAM) TOPICAL 2 TIMES DAILY
Status: DISCONTINUED | OUTPATIENT
Start: 2021-06-30 | End: 2021-07-07 | Stop reason: HOSPADM

## 2021-06-30 RX ORDER — ANASTROZOLE 1 MG/1
1 TABLET ORAL DAILY
Status: DISCONTINUED | OUTPATIENT
Start: 2021-07-01 | End: 2021-07-07 | Stop reason: HOSPADM

## 2021-06-30 RX ORDER — ERGOCALCIFEROL 1.25 MG/1
50000 CAPSULE ORAL
Status: DISCONTINUED | OUTPATIENT
Start: 2021-07-06 | End: 2021-07-07 | Stop reason: HOSPADM

## 2021-06-30 RX ORDER — ATORVASTATIN CALCIUM 40 MG/1
80 TABLET, FILM COATED ORAL DAILY
Status: DISCONTINUED | OUTPATIENT
Start: 2021-07-01 | End: 2021-07-07 | Stop reason: HOSPADM

## 2021-06-30 RX ORDER — FERROUS SULFATE 325(65) MG
325 TABLET, DELAYED RELEASE (ENTERIC COATED) ORAL NIGHTLY
Status: DISCONTINUED | OUTPATIENT
Start: 2021-06-30 | End: 2021-07-07 | Stop reason: HOSPADM

## 2021-06-30 RX ORDER — BUTALBITAL, ACETAMINOPHEN AND CAFFEINE 50; 325; 40 MG/1; MG/1; MG/1
1 TABLET ORAL DAILY PRN
Status: DISCONTINUED | OUTPATIENT
Start: 2021-06-30 | End: 2021-07-07 | Stop reason: HOSPADM

## 2021-06-30 RX ORDER — CEFTRIAXONE 1 G/1
1 INJECTION, POWDER, FOR SOLUTION INTRAMUSCULAR; INTRAVENOUS
Status: COMPLETED | OUTPATIENT
Start: 2021-06-30 | End: 2021-06-30

## 2021-06-30 RX ORDER — ONDANSETRON 2 MG/ML
4 INJECTION INTRAMUSCULAR; INTRAVENOUS
Status: COMPLETED | OUTPATIENT
Start: 2021-06-30 | End: 2021-06-30

## 2021-06-30 RX ORDER — HYDRALAZINE HYDROCHLORIDE 50 MG/1
50 TABLET, FILM COATED ORAL EVERY 8 HOURS
Status: DISCONTINUED | OUTPATIENT
Start: 2021-06-30 | End: 2021-07-02

## 2021-06-30 RX ORDER — IBUPROFEN 200 MG
16 TABLET ORAL
Status: DISCONTINUED | OUTPATIENT
Start: 2021-06-30 | End: 2021-07-07 | Stop reason: HOSPADM

## 2021-06-30 RX ORDER — METHOCARBAMOL 750 MG/1
1500 TABLET, FILM COATED ORAL 3 TIMES DAILY PRN
Status: DISCONTINUED | OUTPATIENT
Start: 2021-06-30 | End: 2021-07-07 | Stop reason: HOSPADM

## 2021-06-30 RX ORDER — SODIUM CHLORIDE, SODIUM LACTATE, POTASSIUM CHLORIDE, CALCIUM CHLORIDE 600; 310; 30; 20 MG/100ML; MG/100ML; MG/100ML; MG/100ML
INJECTION, SOLUTION INTRAVENOUS CONTINUOUS
Status: ACTIVE | OUTPATIENT
Start: 2021-06-30 | End: 2021-07-01

## 2021-06-30 RX ORDER — SODIUM BICARBONATE 650 MG/1
1300 TABLET ORAL 3 TIMES DAILY
Status: DISCONTINUED | OUTPATIENT
Start: 2021-06-30 | End: 2021-07-07 | Stop reason: HOSPADM

## 2021-06-30 RX ORDER — OXYBUTYNIN CHLORIDE 10 MG/1
10 TABLET, EXTENDED RELEASE ORAL DAILY
Status: DISCONTINUED | OUTPATIENT
Start: 2021-07-01 | End: 2021-07-07 | Stop reason: HOSPADM

## 2021-06-30 RX ORDER — AMOXICILLIN 250 MG
1 CAPSULE ORAL 2 TIMES DAILY
Status: DISCONTINUED | OUTPATIENT
Start: 2021-06-30 | End: 2021-07-07 | Stop reason: HOSPADM

## 2021-06-30 RX ORDER — INSULIN ASPART 100 [IU]/ML
8 INJECTION, SOLUTION INTRAVENOUS; SUBCUTANEOUS
Status: DISCONTINUED | OUTPATIENT
Start: 2021-06-30 | End: 2021-07-01

## 2021-06-30 RX ORDER — HEPARIN SODIUM 5000 [USP'U]/ML
7500 INJECTION, SOLUTION INTRAVENOUS; SUBCUTANEOUS EVERY 8 HOURS
Status: DISCONTINUED | OUTPATIENT
Start: 2021-06-30 | End: 2021-07-07 | Stop reason: HOSPADM

## 2021-06-30 RX ORDER — SODIUM CHLORIDE 0.9 % (FLUSH) 0.9 %
10 SYRINGE (ML) INJECTION
Status: CANCELLED | OUTPATIENT
Start: 2021-06-30

## 2021-06-30 RX ORDER — IBUPROFEN 200 MG
24 TABLET ORAL
Status: DISCONTINUED | OUTPATIENT
Start: 2021-06-30 | End: 2021-07-07 | Stop reason: HOSPADM

## 2021-06-30 RX ORDER — INSULIN ASPART 100 [IU]/ML
1-10 INJECTION, SOLUTION INTRAVENOUS; SUBCUTANEOUS
Status: DISCONTINUED | OUTPATIENT
Start: 2021-06-30 | End: 2021-07-07 | Stop reason: HOSPADM

## 2021-06-30 RX ORDER — GLUCAGON 1 MG
1 KIT INJECTION
Status: DISCONTINUED | OUTPATIENT
Start: 2021-06-30 | End: 2021-07-07 | Stop reason: HOSPADM

## 2021-06-30 RX ORDER — LEVOTHYROXINE SODIUM 50 UG/1
50 TABLET ORAL
Status: DISCONTINUED | OUTPATIENT
Start: 2021-07-01 | End: 2021-07-07 | Stop reason: HOSPADM

## 2021-06-30 RX ORDER — PROCHLORPERAZINE EDISYLATE 5 MG/ML
5 INJECTION INTRAMUSCULAR; INTRAVENOUS EVERY 6 HOURS PRN
Status: DISCONTINUED | OUTPATIENT
Start: 2021-06-30 | End: 2021-07-07 | Stop reason: HOSPADM

## 2021-06-30 RX ORDER — PROPRANOLOL HYDROCHLORIDE 80 MG/1
80 CAPSULE, EXTENDED RELEASE ORAL DAILY
Status: DISCONTINUED | OUTPATIENT
Start: 2021-07-01 | End: 2021-07-07 | Stop reason: HOSPADM

## 2021-06-30 RX ORDER — CLONIDINE HYDROCHLORIDE 0.2 MG/1
0.2 TABLET ORAL 3 TIMES DAILY
Status: DISCONTINUED | OUTPATIENT
Start: 2021-06-30 | End: 2021-07-07 | Stop reason: HOSPADM

## 2021-06-30 RX ORDER — SUMATRIPTAN 50 MG/1
50 TABLET, FILM COATED ORAL 2 TIMES DAILY PRN
Status: DISCONTINUED | OUTPATIENT
Start: 2021-06-30 | End: 2021-07-02

## 2021-06-30 RX ORDER — DICLOFENAC SODIUM 10 MG/G
2 GEL TOPICAL 3 TIMES DAILY PRN
Status: DISCONTINUED | OUTPATIENT
Start: 2021-06-30 | End: 2021-07-07 | Stop reason: HOSPADM

## 2021-06-30 RX ORDER — HYDROCODONE BITARTRATE AND ACETAMINOPHEN 10; 325 MG/1; MG/1
1 TABLET ORAL EVERY 6 HOURS PRN
Status: DISCONTINUED | OUTPATIENT
Start: 2021-06-30 | End: 2021-07-07 | Stop reason: HOSPADM

## 2021-06-30 RX ORDER — DIPHENHYDRAMINE HCL 25 MG
25 CAPSULE ORAL DAILY PRN
Status: DISCONTINUED | OUTPATIENT
Start: 2021-06-30 | End: 2021-07-07 | Stop reason: HOSPADM

## 2021-06-30 RX ADMIN — SODIUM BICARBONATE 1300 MG: 650 TABLET ORAL at 09:06

## 2021-06-30 RX ADMIN — SODIUM CHLORIDE, SODIUM LACTATE, POTASSIUM CHLORIDE, AND CALCIUM CHLORIDE: 600; 310; 30; 20 INJECTION, SOLUTION INTRAVENOUS at 11:06

## 2021-06-30 RX ADMIN — MICONAZOLE NITRATE: 20 POWDER TOPICAL at 10:06

## 2021-06-30 RX ADMIN — HYDROCODONE BITARTRATE AND ACETAMINOPHEN 1 TABLET: 10; 325 TABLET ORAL at 09:06

## 2021-06-30 RX ADMIN — CLONIDINE HYDROCHLORIDE 0.2 MG: 0.2 TABLET ORAL at 06:06

## 2021-06-30 RX ADMIN — HYDROCODONE BITARTRATE AND ACETAMINOPHEN 1 TABLET: 10; 325 TABLET ORAL at 01:06

## 2021-06-30 RX ADMIN — INSULIN DETEMIR 14 UNITS: 100 INJECTION, SOLUTION SUBCUTANEOUS at 09:06

## 2021-06-30 RX ADMIN — HEPARIN SODIUM 7500 UNITS: 5000 INJECTION INTRAVENOUS; SUBCUTANEOUS at 11:06

## 2021-06-30 RX ADMIN — Medication 400 MG: at 09:06

## 2021-06-30 RX ADMIN — HYDRALAZINE HYDROCHLORIDE 50 MG: 50 TABLET ORAL at 09:06

## 2021-06-30 RX ADMIN — DOCUSATE SODIUM 50 MG AND SENNOSIDES 8.6 MG 1 TABLET: 8.6; 5 TABLET, FILM COATED ORAL at 11:06

## 2021-06-30 RX ADMIN — CEFTRIAXONE 1 G: 1 INJECTION, POWDER, FOR SOLUTION INTRAMUSCULAR; INTRAVENOUS at 01:06

## 2021-06-30 RX ADMIN — METHOCARBAMOL 1500 MG: 750 TABLET ORAL at 09:06

## 2021-06-30 RX ADMIN — ONDANSETRON 4 MG: 2 INJECTION INTRAMUSCULAR; INTRAVENOUS at 03:06

## 2021-06-30 RX ADMIN — TRAZODONE HYDROCHLORIDE 100 MG: 100 TABLET ORAL at 09:06

## 2021-06-30 RX ADMIN — FERROUS SULFATE TAB EC 325 MG (65 MG FE EQUIVALENT) 325 MG: 325 (65 FE) TABLET DELAYED RESPONSE at 09:06

## 2021-06-30 RX ADMIN — SUMATRIPTAN SUCCINATE 50 MG: 50 TABLET ORAL at 10:06

## 2021-06-30 RX ADMIN — SODIUM CHLORIDE 1000 ML: 0.9 INJECTION, SOLUTION INTRAVENOUS at 12:06

## 2021-06-30 RX ADMIN — INSULIN ASPART 8 UNITS: 100 INJECTION, SOLUTION INTRAVENOUS; SUBCUTANEOUS at 06:06

## 2021-06-30 RX ADMIN — GEMFIBROZIL 600 MG: 600 TABLET ORAL at 10:06

## 2021-06-30 RX ADMIN — CLONIDINE HYDROCHLORIDE 0.2 MG: 0.2 TABLET ORAL at 09:06

## 2021-07-01 ENCOUNTER — TELEPHONE (OUTPATIENT)
Dept: UROLOGY | Facility: CLINIC | Age: 71
End: 2021-07-01

## 2021-07-01 ENCOUNTER — ANESTHESIA EVENT (OUTPATIENT)
Dept: SURGERY | Facility: HOSPITAL | Age: 71
DRG: 660 | End: 2021-07-01
Payer: MEDICARE

## 2021-07-01 LAB
ALBUMIN SERPL BCP-MCNC: 2.2 G/DL (ref 3.5–5.2)
ALP SERPL-CCNC: 129 U/L (ref 55–135)
ALT SERPL W/O P-5'-P-CCNC: 13 U/L (ref 10–44)
ANION GAP SERPL CALC-SCNC: 5 MMOL/L (ref 8–16)
APTT BLDCRRT: 30.3 SEC (ref 21–32)
AST SERPL-CCNC: 14 U/L (ref 10–40)
BACTERIA UR CULT: NORMAL
BACTERIA UR CULT: NORMAL
BASOPHILS # BLD AUTO: 0.05 K/UL (ref 0–0.2)
BASOPHILS NFR BLD: 0.5 % (ref 0–1.9)
BILIRUB SERPL-MCNC: 0.2 MG/DL (ref 0.1–1)
BNP SERPL-MCNC: 234 PG/ML (ref 0–99)
BUN SERPL-MCNC: 26 MG/DL (ref 8–23)
CALCIUM SERPL-MCNC: 8.6 MG/DL (ref 8.7–10.5)
CHLORIDE SERPL-SCNC: 108 MMOL/L (ref 95–110)
CO2 SERPL-SCNC: 20 MMOL/L (ref 23–29)
CREAT SERPL-MCNC: 2.5 MG/DL (ref 0.5–1.4)
DIFFERENTIAL METHOD: ABNORMAL
EOSINOPHIL # BLD AUTO: 0.4 K/UL (ref 0–0.5)
EOSINOPHIL NFR BLD: 4 % (ref 0–8)
ERYTHROCYTE [DISTWIDTH] IN BLOOD BY AUTOMATED COUNT: 15.1 % (ref 11.5–14.5)
EST. GFR  (AFRICAN AMERICAN): 22.1 ML/MIN/1.73 M^2
EST. GFR  (NON AFRICAN AMERICAN): 19.2 ML/MIN/1.73 M^2
GLUCOSE SERPL-MCNC: 125 MG/DL (ref 70–110)
GRAM STN SPEC: NORMAL
GRAM STN SPEC: NORMAL
HCT VFR BLD AUTO: 28.7 % (ref 37–48.5)
HGB BLD-MCNC: 8.8 G/DL (ref 12–16)
IMM GRANULOCYTES # BLD AUTO: 0.05 K/UL (ref 0–0.04)
IMM GRANULOCYTES NFR BLD AUTO: 0.5 % (ref 0–0.5)
INR PPP: 0.9 (ref 0.8–1.2)
LYMPHOCYTES # BLD AUTO: 2.6 K/UL (ref 1–4.8)
LYMPHOCYTES NFR BLD: 27.5 % (ref 18–48)
MAGNESIUM SERPL-MCNC: 1.6 MG/DL (ref 1.6–2.6)
MCH RBC QN AUTO: 27.9 PG (ref 27–31)
MCHC RBC AUTO-ENTMCNC: 30.7 G/DL (ref 32–36)
MCV RBC AUTO: 91 FL (ref 82–98)
MONOCYTES # BLD AUTO: 0.9 K/UL (ref 0.3–1)
MONOCYTES NFR BLD: 9.6 % (ref 4–15)
NEUTROPHILS # BLD AUTO: 5.5 K/UL (ref 1.8–7.7)
NEUTROPHILS NFR BLD: 57.9 % (ref 38–73)
NRBC BLD-RTO: 0 /100 WBC
PHOSPHATE SERPL-MCNC: 4.2 MG/DL (ref 2.7–4.5)
PLATELET # BLD AUTO: 310 K/UL (ref 150–450)
PMV BLD AUTO: 9.2 FL (ref 9.2–12.9)
POCT GLUCOSE: 105 MG/DL (ref 70–110)
POCT GLUCOSE: 160 MG/DL (ref 70–110)
POCT GLUCOSE: 173 MG/DL (ref 70–110)
POCT GLUCOSE: 237 MG/DL (ref 70–110)
POTASSIUM SERPL-SCNC: 5.5 MMOL/L (ref 3.5–5.1)
PROT SERPL-MCNC: 5.7 G/DL (ref 6–8.4)
PROTHROMBIN TIME: 10.3 SEC (ref 9–12.5)
RBC # BLD AUTO: 3.15 M/UL (ref 4–5.4)
SODIUM SERPL-SCNC: 133 MMOL/L (ref 136–145)
WBC # BLD AUTO: 9.49 K/UL (ref 3.9–12.7)

## 2021-07-01 PROCEDURE — 97161 PT EVAL LOW COMPLEX 20 MIN: CPT

## 2021-07-01 PROCEDURE — 99233 PR SUBSEQUENT HOSPITAL CARE,LEVL III: ICD-10-PCS | Mod: ,,, | Performed by: STUDENT IN AN ORGANIZED HEALTH CARE EDUCATION/TRAINING PROGRAM

## 2021-07-01 PROCEDURE — 86580 TB INTRADERMAL TEST: CPT | Performed by: STUDENT IN AN ORGANIZED HEALTH CARE EDUCATION/TRAINING PROGRAM

## 2021-07-01 PROCEDURE — 97535 SELF CARE MNGMENT TRAINING: CPT

## 2021-07-01 PROCEDURE — 96376 TX/PRO/DX INJ SAME DRUG ADON: CPT

## 2021-07-01 PROCEDURE — 85025 COMPLETE CBC W/AUTO DIFF WBC: CPT | Performed by: HOSPITALIST

## 2021-07-01 PROCEDURE — 96372 THER/PROPH/DIAG INJ SC/IM: CPT

## 2021-07-01 PROCEDURE — 11000001 HC ACUTE MED/SURG PRIVATE ROOM

## 2021-07-01 PROCEDURE — 97530 THERAPEUTIC ACTIVITIES: CPT

## 2021-07-01 PROCEDURE — 99233 SBSQ HOSP IP/OBS HIGH 50: CPT | Mod: ,,, | Performed by: STUDENT IN AN ORGANIZED HEALTH CARE EDUCATION/TRAINING PROGRAM

## 2021-07-01 PROCEDURE — 30200315 PPD INTRADERMAL TEST REV CODE 302: Performed by: STUDENT IN AN ORGANIZED HEALTH CARE EDUCATION/TRAINING PROGRAM

## 2021-07-01 PROCEDURE — 25000003 PHARM REV CODE 250: Performed by: STUDENT IN AN ORGANIZED HEALTH CARE EDUCATION/TRAINING PROGRAM

## 2021-07-01 PROCEDURE — 87040 BLOOD CULTURE FOR BACTERIA: CPT | Performed by: STUDENT IN AN ORGANIZED HEALTH CARE EDUCATION/TRAINING PROGRAM

## 2021-07-01 PROCEDURE — 85730 THROMBOPLASTIN TIME PARTIAL: CPT | Performed by: HOSPITALIST

## 2021-07-01 PROCEDURE — 25000003 PHARM REV CODE 250: Performed by: HOSPITALIST

## 2021-07-01 PROCEDURE — 80053 COMPREHEN METABOLIC PANEL: CPT | Performed by: HOSPITALIST

## 2021-07-01 PROCEDURE — 63600175 PHARM REV CODE 636 W HCPCS: Performed by: STUDENT IN AN ORGANIZED HEALTH CARE EDUCATION/TRAINING PROGRAM

## 2021-07-01 PROCEDURE — 84100 ASSAY OF PHOSPHORUS: CPT | Performed by: HOSPITALIST

## 2021-07-01 PROCEDURE — 85610 PROTHROMBIN TIME: CPT | Performed by: HOSPITALIST

## 2021-07-01 PROCEDURE — 36415 COLL VENOUS BLD VENIPUNCTURE: CPT | Performed by: HOSPITALIST

## 2021-07-01 PROCEDURE — 97165 OT EVAL LOW COMPLEX 30 MIN: CPT

## 2021-07-01 PROCEDURE — 63600175 PHARM REV CODE 636 W HCPCS: Performed by: HOSPITALIST

## 2021-07-01 PROCEDURE — 83735 ASSAY OF MAGNESIUM: CPT | Performed by: HOSPITALIST

## 2021-07-01 PROCEDURE — 83880 ASSAY OF NATRIURETIC PEPTIDE: CPT | Performed by: HOSPITALIST

## 2021-07-01 RX ORDER — INSULIN ASPART 100 [IU]/ML
10 INJECTION, SOLUTION INTRAVENOUS; SUBCUTANEOUS
Status: DISCONTINUED | OUTPATIENT
Start: 2021-07-02 | End: 2021-07-07 | Stop reason: HOSPADM

## 2021-07-01 RX ORDER — NIFEDIPINE 30 MG/1
60 TABLET, EXTENDED RELEASE ORAL NIGHTLY
Status: DISCONTINUED | OUTPATIENT
Start: 2021-07-01 | End: 2021-07-02

## 2021-07-01 RX ORDER — L. ACIDOPHILUS/L.BULGARICUS 100MM CELL
1 GRANULES IN PACKET (EA) ORAL 2 TIMES DAILY
Status: DISCONTINUED | OUTPATIENT
Start: 2021-07-01 | End: 2021-07-07 | Stop reason: HOSPADM

## 2021-07-01 RX ADMIN — MICONAZOLE NITRATE: 20 POWDER TOPICAL at 09:07

## 2021-07-01 RX ADMIN — HYDROCODONE BITARTRATE AND ACETAMINOPHEN 1 TABLET: 10; 325 TABLET ORAL at 05:07

## 2021-07-01 RX ADMIN — HEPARIN SODIUM 7500 UNITS: 5000 INJECTION INTRAVENOUS; SUBCUTANEOUS at 05:07

## 2021-07-01 RX ADMIN — NIFEDIPINE 60 MG: 30 TABLET, FILM COATED, EXTENDED RELEASE ORAL at 08:07

## 2021-07-01 RX ADMIN — CEFTRIAXONE 2 G: 2 INJECTION, SOLUTION INTRAVENOUS at 02:07

## 2021-07-01 RX ADMIN — DOCUSATE SODIUM 50 MG AND SENNOSIDES 8.6 MG 1 TABLET: 8.6; 5 TABLET, FILM COATED ORAL at 08:07

## 2021-07-01 RX ADMIN — METHOCARBAMOL 1500 MG: 750 TABLET ORAL at 06:07

## 2021-07-01 RX ADMIN — ATORVASTATIN CALCIUM 80 MG: 40 TABLET, FILM COATED ORAL at 09:07

## 2021-07-01 RX ADMIN — ANASTROZOLE 1 MG: 1 TABLET, COATED ORAL at 10:07

## 2021-07-01 RX ADMIN — TUBERCULIN PURIFIED PROTEIN DERIVATIVE 5 UNITS: 5 INJECTION, SOLUTION INTRADERMAL at 12:07

## 2021-07-01 RX ADMIN — INSULIN ASPART 8 UNITS: 100 INJECTION, SOLUTION INTRAVENOUS; SUBCUTANEOUS at 08:07

## 2021-07-01 RX ADMIN — SUMATRIPTAN SUCCINATE 50 MG: 50 TABLET ORAL at 08:07

## 2021-07-01 RX ADMIN — SODIUM BICARBONATE 1300 MG: 650 TABLET ORAL at 08:07

## 2021-07-01 RX ADMIN — VENLAFAXINE HYDROCHLORIDE 150 MG: 75 CAPSULE, EXTENDED RELEASE ORAL at 09:07

## 2021-07-01 RX ADMIN — VANCOMYCIN HYDROCHLORIDE 2000 MG: 10 INJECTION, POWDER, LYOPHILIZED, FOR SOLUTION INTRAVENOUS at 03:07

## 2021-07-01 RX ADMIN — CLONIDINE HYDROCHLORIDE 0.2 MG: 0.2 TABLET ORAL at 04:07

## 2021-07-01 RX ADMIN — INSULIN ASPART 8 UNITS: 100 INJECTION, SOLUTION INTRAVENOUS; SUBCUTANEOUS at 12:07

## 2021-07-01 RX ADMIN — CLONIDINE HYDROCHLORIDE 0.2 MG: 0.2 TABLET ORAL at 09:07

## 2021-07-01 RX ADMIN — TRAZODONE HYDROCHLORIDE 100 MG: 100 TABLET ORAL at 08:07

## 2021-07-01 RX ADMIN — HYDRALAZINE HYDROCHLORIDE 50 MG: 50 TABLET ORAL at 05:07

## 2021-07-01 RX ADMIN — PROPRANOLOL HYDROCHLORIDE 80 MG: 80 CAPSULE, EXTENDED RELEASE ORAL at 10:07

## 2021-07-01 RX ADMIN — Medication 400 MG: at 09:07

## 2021-07-01 RX ADMIN — HYDRALAZINE HYDROCHLORIDE 50 MG: 50 TABLET ORAL at 01:07

## 2021-07-01 RX ADMIN — SUMATRIPTAN SUCCINATE 50 MG: 50 TABLET ORAL at 12:07

## 2021-07-01 RX ADMIN — HEPARIN SODIUM 7500 UNITS: 5000 INJECTION INTRAVENOUS; SUBCUTANEOUS at 01:07

## 2021-07-01 RX ADMIN — SODIUM BICARBONATE 1300 MG: 650 TABLET ORAL at 09:07

## 2021-07-01 RX ADMIN — SODIUM ZIRCONIUM CYCLOSILICATE 5 G: 5 POWDER, FOR SUSPENSION ORAL at 01:07

## 2021-07-01 RX ADMIN — LEVOTHYROXINE SODIUM 50 MCG: 50 TABLET ORAL at 05:07

## 2021-07-01 RX ADMIN — HYDRALAZINE HYDROCHLORIDE 50 MG: 50 TABLET ORAL at 09:07

## 2021-07-01 RX ADMIN — MICONAZOLE NITRATE: 20 POWDER TOPICAL at 08:07

## 2021-07-01 RX ADMIN — INSULIN ASPART 8 UNITS: 100 INJECTION, SOLUTION INTRAVENOUS; SUBCUTANEOUS at 05:07

## 2021-07-01 RX ADMIN — INSULIN ASPART 4 UNITS: 100 INJECTION, SOLUTION INTRAVENOUS; SUBCUTANEOUS at 05:07

## 2021-07-01 RX ADMIN — ONDANSETRON 4 MG: 2 INJECTION INTRAMUSCULAR; INTRAVENOUS at 09:07

## 2021-07-01 RX ADMIN — LACTOBACILLUS ACIDOPHILUS / LACTOBACILLUS BULGARICUS 1 EACH: 100 MILLION CFU STRENGTH GRANULES at 02:07

## 2021-07-01 RX ADMIN — ONDANSETRON 4 MG: 2 INJECTION INTRAMUSCULAR; INTRAVENOUS at 03:07

## 2021-07-01 RX ADMIN — FERROUS SULFATE TAB EC 325 MG (65 MG FE EQUIVALENT) 325 MG: 325 (65 FE) TABLET DELAYED RESPONSE at 08:07

## 2021-07-01 RX ADMIN — HYDROCODONE BITARTRATE AND ACETAMINOPHEN 1 TABLET: 10; 325 TABLET ORAL at 12:07

## 2021-07-01 RX ADMIN — METHOCARBAMOL 1500 MG: 750 TABLET ORAL at 05:07

## 2021-07-01 RX ADMIN — METHOCARBAMOL 1500 MG: 750 TABLET ORAL at 12:07

## 2021-07-01 RX ADMIN — INSULIN ASPART 4 UNITS: 100 INJECTION, SOLUTION INTRAVENOUS; SUBCUTANEOUS at 08:07

## 2021-07-01 RX ADMIN — PROCHLORPERAZINE EDISYLATE 5 MG: 5 INJECTION INTRAMUSCULAR; INTRAVENOUS at 05:07

## 2021-07-01 RX ADMIN — OXYBUTYNIN CHLORIDE 10 MG: 10 TABLET, EXTENDED RELEASE ORAL at 09:07

## 2021-07-01 RX ADMIN — Medication 400 MG: at 08:07

## 2021-07-01 RX ADMIN — HYDROCODONE BITARTRATE AND ACETAMINOPHEN 1 TABLET: 10; 325 TABLET ORAL at 03:07

## 2021-07-01 RX ADMIN — HEPARIN SODIUM 7500 UNITS: 5000 INJECTION INTRAVENOUS; SUBCUTANEOUS at 09:07

## 2021-07-01 RX ADMIN — INSULIN DETEMIR 10 UNITS: 100 INJECTION, SOLUTION SUBCUTANEOUS at 08:07

## 2021-07-01 RX ADMIN — SODIUM BICARBONATE 1300 MG: 650 TABLET ORAL at 04:07

## 2021-07-02 ENCOUNTER — ANESTHESIA (OUTPATIENT)
Dept: SURGERY | Facility: HOSPITAL | Age: 71
DRG: 660 | End: 2021-07-02
Payer: MEDICARE

## 2021-07-02 LAB
ALBUMIN SERPL BCP-MCNC: 2.2 G/DL (ref 3.5–5.2)
ALP SERPL-CCNC: 144 U/L (ref 55–135)
ALT SERPL W/O P-5'-P-CCNC: 12 U/L (ref 10–44)
ANION GAP SERPL CALC-SCNC: 10 MMOL/L (ref 8–16)
AST SERPL-CCNC: 12 U/L (ref 10–40)
BASOPHILS # BLD AUTO: 0.04 K/UL (ref 0–0.2)
BASOPHILS NFR BLD: 0.5 % (ref 0–1.9)
BILIRUB SERPL-MCNC: 0.2 MG/DL (ref 0.1–1)
BUN SERPL-MCNC: 26 MG/DL (ref 8–23)
CALCIUM SERPL-MCNC: 8.5 MG/DL (ref 8.7–10.5)
CHLORIDE SERPL-SCNC: 103 MMOL/L (ref 95–110)
CO2 SERPL-SCNC: 17 MMOL/L (ref 23–29)
CREAT SERPL-MCNC: 2.5 MG/DL (ref 0.5–1.4)
DIFFERENTIAL METHOD: ABNORMAL
EOSINOPHIL # BLD AUTO: 0.3 K/UL (ref 0–0.5)
EOSINOPHIL NFR BLD: 3.3 % (ref 0–8)
ERYTHROCYTE [DISTWIDTH] IN BLOOD BY AUTOMATED COUNT: 14.9 % (ref 11.5–14.5)
EST. GFR  (AFRICAN AMERICAN): 22.1 ML/MIN/1.73 M^2
EST. GFR  (NON AFRICAN AMERICAN): 19.2 ML/MIN/1.73 M^2
GLUCOSE SERPL-MCNC: 101 MG/DL (ref 70–110)
HCT VFR BLD AUTO: 30.2 % (ref 37–48.5)
HGB BLD-MCNC: 9.3 G/DL (ref 12–16)
IMM GRANULOCYTES # BLD AUTO: 0.06 K/UL (ref 0–0.04)
IMM GRANULOCYTES NFR BLD AUTO: 0.7 % (ref 0–0.5)
LYMPHOCYTES # BLD AUTO: 1.8 K/UL (ref 1–4.8)
LYMPHOCYTES NFR BLD: 20.9 % (ref 18–48)
MAGNESIUM SERPL-MCNC: 1.7 MG/DL (ref 1.6–2.6)
MCH RBC QN AUTO: 28.6 PG (ref 27–31)
MCHC RBC AUTO-ENTMCNC: 30.8 G/DL (ref 32–36)
MCV RBC AUTO: 93 FL (ref 82–98)
MONOCYTES # BLD AUTO: 0.8 K/UL (ref 0.3–1)
MONOCYTES NFR BLD: 9.3 % (ref 4–15)
NEUTROPHILS # BLD AUTO: 5.7 K/UL (ref 1.8–7.7)
NEUTROPHILS NFR BLD: 65.3 % (ref 38–73)
NRBC BLD-RTO: 0 /100 WBC
PHOSPHATE SERPL-MCNC: 4.7 MG/DL (ref 2.7–4.5)
PLATELET # BLD AUTO: 367 K/UL (ref 150–450)
PMV BLD AUTO: 9.7 FL (ref 9.2–12.9)
POCT GLUCOSE: 103 MG/DL (ref 70–110)
POCT GLUCOSE: 121 MG/DL (ref 70–110)
POTASSIUM SERPL-SCNC: 5.4 MMOL/L (ref 3.5–5.1)
PROT SERPL-MCNC: 6 G/DL (ref 6–8.4)
RBC # BLD AUTO: 3.25 M/UL (ref 4–5.4)
SODIUM SERPL-SCNC: 130 MMOL/L (ref 136–145)
VANCOMYCIN SERPL-MCNC: 14.6 UG/ML
WBC # BLD AUTO: 8.68 K/UL (ref 3.9–12.7)

## 2021-07-02 PROCEDURE — 97168 OT RE-EVAL EST PLAN CARE: CPT

## 2021-07-02 PROCEDURE — 63600175 PHARM REV CODE 636 W HCPCS: Performed by: STUDENT IN AN ORGANIZED HEALTH CARE EDUCATION/TRAINING PROGRAM

## 2021-07-02 PROCEDURE — 97530 THERAPEUTIC ACTIVITIES: CPT

## 2021-07-02 PROCEDURE — 11000001 HC ACUTE MED/SURG PRIVATE ROOM

## 2021-07-02 PROCEDURE — D9220A PRA ANESTHESIA: ICD-10-PCS | Mod: ANES,,, | Performed by: ANESTHESIOLOGY

## 2021-07-02 PROCEDURE — 63600175 PHARM REV CODE 636 W HCPCS: Performed by: NURSE ANESTHETIST, CERTIFIED REGISTERED

## 2021-07-02 PROCEDURE — 37000009 HC ANESTHESIA EA ADD 15 MINS: Performed by: UROLOGY

## 2021-07-02 PROCEDURE — 83735 ASSAY OF MAGNESIUM: CPT | Performed by: HOSPITALIST

## 2021-07-02 PROCEDURE — 25000003 PHARM REV CODE 250: Performed by: STUDENT IN AN ORGANIZED HEALTH CARE EDUCATION/TRAINING PROGRAM

## 2021-07-02 PROCEDURE — 84100 ASSAY OF PHOSPHORUS: CPT | Performed by: HOSPITALIST

## 2021-07-02 PROCEDURE — C1769 GUIDE WIRE: HCPCS | Performed by: UROLOGY

## 2021-07-02 PROCEDURE — 99222 1ST HOSP IP/OBS MODERATE 55: CPT | Mod: ,,, | Performed by: NURSE PRACTITIONER

## 2021-07-02 PROCEDURE — D9220A PRA ANESTHESIA: ICD-10-PCS | Mod: CRNA,,, | Performed by: NURSE ANESTHETIST, CERTIFIED REGISTERED

## 2021-07-02 PROCEDURE — C2617 STENT, NON-COR, TEM W/O DEL: HCPCS | Performed by: UROLOGY

## 2021-07-02 PROCEDURE — 71000044 HC DOSC ROUTINE RECOVERY FIRST HOUR: Performed by: UROLOGY

## 2021-07-02 PROCEDURE — 71000015 HC POSTOP RECOV 1ST HR: Performed by: UROLOGY

## 2021-07-02 PROCEDURE — 63600175 PHARM REV CODE 636 W HCPCS: Performed by: HOSPITALIST

## 2021-07-02 PROCEDURE — C9399 UNCLASSIFIED DRUGS OR BIOLOG: HCPCS | Performed by: STUDENT IN AN ORGANIZED HEALTH CARE EDUCATION/TRAINING PROGRAM

## 2021-07-02 PROCEDURE — D9220A PRA ANESTHESIA: Mod: CRNA,,, | Performed by: NURSE ANESTHETIST, CERTIFIED REGISTERED

## 2021-07-02 PROCEDURE — 80202 ASSAY OF VANCOMYCIN: CPT | Performed by: STUDENT IN AN ORGANIZED HEALTH CARE EDUCATION/TRAINING PROGRAM

## 2021-07-02 PROCEDURE — 52332 CYSTOSCOPY AND TREATMENT: CPT | Mod: LT,,, | Performed by: UROLOGY

## 2021-07-02 PROCEDURE — 99233 PR SUBSEQUENT HOSPITAL CARE,LEVL III: ICD-10-PCS | Mod: ,,, | Performed by: STUDENT IN AN ORGANIZED HEALTH CARE EDUCATION/TRAINING PROGRAM

## 2021-07-02 PROCEDURE — 82962 GLUCOSE BLOOD TEST: CPT | Performed by: UROLOGY

## 2021-07-02 PROCEDURE — D9220A PRA ANESTHESIA: Mod: ANES,,, | Performed by: ANESTHESIOLOGY

## 2021-07-02 PROCEDURE — 25000003 PHARM REV CODE 250: Performed by: UROLOGY

## 2021-07-02 PROCEDURE — 85025 COMPLETE CBC W/AUTO DIFF WBC: CPT | Performed by: HOSPITALIST

## 2021-07-02 PROCEDURE — 36000706: Performed by: UROLOGY

## 2021-07-02 PROCEDURE — 36415 COLL VENOUS BLD VENIPUNCTURE: CPT | Performed by: STUDENT IN AN ORGANIZED HEALTH CARE EDUCATION/TRAINING PROGRAM

## 2021-07-02 PROCEDURE — 25000003 PHARM REV CODE 250: Performed by: NURSE ANESTHETIST, CERTIFIED REGISTERED

## 2021-07-02 PROCEDURE — 25000003 PHARM REV CODE 250: Performed by: HOSPITALIST

## 2021-07-02 PROCEDURE — 52332 PR CYSTOSCOPY,INSERT URETERAL STENT: ICD-10-PCS | Mod: LT,,, | Performed by: UROLOGY

## 2021-07-02 PROCEDURE — 99222 PR INITIAL HOSPITAL CARE,LEVL II: ICD-10-PCS | Mod: ,,, | Performed by: NURSE PRACTITIONER

## 2021-07-02 PROCEDURE — 87040 BLOOD CULTURE FOR BACTERIA: CPT | Mod: 59 | Performed by: STUDENT IN AN ORGANIZED HEALTH CARE EDUCATION/TRAINING PROGRAM

## 2021-07-02 PROCEDURE — 99233 SBSQ HOSP IP/OBS HIGH 50: CPT | Mod: ,,, | Performed by: STUDENT IN AN ORGANIZED HEALTH CARE EDUCATION/TRAINING PROGRAM

## 2021-07-02 PROCEDURE — 36000707: Performed by: UROLOGY

## 2021-07-02 PROCEDURE — 25000003 PHARM REV CODE 250: Performed by: PHYSICIAN ASSISTANT

## 2021-07-02 PROCEDURE — 37000008 HC ANESTHESIA 1ST 15 MINUTES: Performed by: UROLOGY

## 2021-07-02 PROCEDURE — 80053 COMPREHEN METABOLIC PANEL: CPT | Performed by: HOSPITALIST

## 2021-07-02 DEVICE — STENT URETERAL UNIV 6FR 24CM
Type: IMPLANTABLE DEVICE | Site: URETER | Status: NON-FUNCTIONAL
Removed: 2021-12-23

## 2021-07-02 RX ORDER — FENTANYL CITRATE 50 UG/ML
INJECTION, SOLUTION INTRAMUSCULAR; INTRAVENOUS
Status: DISCONTINUED | OUTPATIENT
Start: 2021-07-02 | End: 2021-07-02

## 2021-07-02 RX ORDER — LIDOCAINE HYDROCHLORIDE 20 MG/ML
JELLY TOPICAL
Status: DISCONTINUED | OUTPATIENT
Start: 2021-07-02 | End: 2021-07-02 | Stop reason: HOSPADM

## 2021-07-02 RX ORDER — MIDAZOLAM HYDROCHLORIDE 1 MG/ML
INJECTION, SOLUTION INTRAMUSCULAR; INTRAVENOUS
Status: DISCONTINUED | OUTPATIENT
Start: 2021-07-02 | End: 2021-07-02

## 2021-07-02 RX ORDER — SUMATRIPTAN 50 MG/1
50 TABLET, FILM COATED ORAL 2 TIMES DAILY PRN
Status: DISCONTINUED | OUTPATIENT
Start: 2021-07-02 | End: 2021-07-07 | Stop reason: HOSPADM

## 2021-07-02 RX ORDER — PROPOFOL 10 MG/ML
VIAL (ML) INTRAVENOUS
Status: DISCONTINUED | OUTPATIENT
Start: 2021-07-02 | End: 2021-07-02

## 2021-07-02 RX ORDER — DIPHENHYDRAMINE HYDROCHLORIDE 50 MG/ML
25 INJECTION INTRAMUSCULAR; INTRAVENOUS EVERY 6 HOURS PRN
Status: DISCONTINUED | OUTPATIENT
Start: 2021-07-02 | End: 2021-07-02 | Stop reason: HOSPADM

## 2021-07-02 RX ORDER — NIFEDIPINE 30 MG/1
90 TABLET, EXTENDED RELEASE ORAL NIGHTLY
Status: DISCONTINUED | OUTPATIENT
Start: 2021-07-02 | End: 2021-07-03

## 2021-07-02 RX ORDER — HYDRALAZINE HYDROCHLORIDE 25 MG/1
25 TABLET, FILM COATED ORAL EVERY 8 HOURS
Status: DISCONTINUED | OUTPATIENT
Start: 2021-07-02 | End: 2021-07-03

## 2021-07-02 RX ORDER — SODIUM CHLORIDE 0.9 % (FLUSH) 0.9 %
3 SYRINGE (ML) INJECTION
Status: DISCONTINUED | OUTPATIENT
Start: 2021-07-02 | End: 2021-07-02 | Stop reason: HOSPADM

## 2021-07-02 RX ORDER — PROPOFOL 10 MG/ML
VIAL (ML) INTRAVENOUS CONTINUOUS PRN
Status: DISCONTINUED | OUTPATIENT
Start: 2021-07-02 | End: 2021-07-02

## 2021-07-02 RX ORDER — FENTANYL CITRATE 50 UG/ML
25 INJECTION, SOLUTION INTRAMUSCULAR; INTRAVENOUS EVERY 5 MIN PRN
Status: DISCONTINUED | OUTPATIENT
Start: 2021-07-02 | End: 2021-07-02 | Stop reason: HOSPADM

## 2021-07-02 RX ORDER — HALOPERIDOL 5 MG/ML
0.5 INJECTION INTRAMUSCULAR EVERY 10 MIN PRN
Status: DISCONTINUED | OUTPATIENT
Start: 2021-07-02 | End: 2021-07-02 | Stop reason: HOSPADM

## 2021-07-02 RX ORDER — HYDROMORPHONE HYDROCHLORIDE 1 MG/ML
0.2 INJECTION, SOLUTION INTRAMUSCULAR; INTRAVENOUS; SUBCUTANEOUS EVERY 5 MIN PRN
Status: DISCONTINUED | OUTPATIENT
Start: 2021-07-02 | End: 2021-07-02 | Stop reason: HOSPADM

## 2021-07-02 RX ORDER — ONDANSETRON 2 MG/ML
4 INJECTION INTRAMUSCULAR; INTRAVENOUS ONCE AS NEEDED
Status: DISCONTINUED | OUTPATIENT
Start: 2021-07-02 | End: 2021-07-02 | Stop reason: HOSPADM

## 2021-07-02 RX ADMIN — MIDAZOLAM HYDROCHLORIDE 2 MG: 1 INJECTION, SOLUTION INTRAMUSCULAR; INTRAVENOUS at 07:07

## 2021-07-02 RX ADMIN — VANCOMYCIN HYDROCHLORIDE 1250 MG: 1.25 INJECTION, POWDER, LYOPHILIZED, FOR SOLUTION INTRAVENOUS at 05:07

## 2021-07-02 RX ADMIN — HYDROCODONE BITARTRATE AND ACETAMINOPHEN 1 TABLET: 10; 325 TABLET ORAL at 05:07

## 2021-07-02 RX ADMIN — SODIUM BICARBONATE 1300 MG: 650 TABLET ORAL at 09:07

## 2021-07-02 RX ADMIN — FENTANYL CITRATE 50 MCG: 50 INJECTION, SOLUTION INTRAMUSCULAR; INTRAVENOUS at 07:07

## 2021-07-02 RX ADMIN — CLONIDINE HYDROCHLORIDE 0.2 MG: 0.2 TABLET ORAL at 02:07

## 2021-07-02 RX ADMIN — SODIUM BICARBONATE 1300 MG: 650 TABLET ORAL at 02:07

## 2021-07-02 RX ADMIN — VENLAFAXINE HYDROCHLORIDE 150 MG: 75 CAPSULE, EXTENDED RELEASE ORAL at 09:07

## 2021-07-02 RX ADMIN — Medication 400 MG: at 10:07

## 2021-07-02 RX ADMIN — NIFEDIPINE 90 MG: 30 TABLET, FILM COATED, EXTENDED RELEASE ORAL at 10:07

## 2021-07-02 RX ADMIN — CLONIDINE HYDROCHLORIDE 0.2 MG: 0.2 TABLET ORAL at 10:07

## 2021-07-02 RX ADMIN — ANASTROZOLE 1 MG: 1 TABLET, COATED ORAL at 09:07

## 2021-07-02 RX ADMIN — Medication 100 MCG/KG/MIN: at 07:07

## 2021-07-02 RX ADMIN — DOCUSATE SODIUM 50 MG AND SENNOSIDES 8.6 MG 1 TABLET: 8.6; 5 TABLET, FILM COATED ORAL at 10:07

## 2021-07-02 RX ADMIN — PROPOFOL 50 MG: 10 INJECTION, EMULSION INTRAVENOUS at 07:07

## 2021-07-02 RX ADMIN — METHOCARBAMOL 1500 MG: 750 TABLET ORAL at 10:07

## 2021-07-02 RX ADMIN — Medication 400 MG: at 09:07

## 2021-07-02 RX ADMIN — CLONIDINE HYDROCHLORIDE 0.2 MG: 0.2 TABLET ORAL at 09:07

## 2021-07-02 RX ADMIN — LACTOBACILLUS ACIDOPHILUS / LACTOBACILLUS BULGARICUS 1 EACH: 100 MILLION CFU STRENGTH GRANULES at 09:07

## 2021-07-02 RX ADMIN — HYDRALAZINE HYDROCHLORIDE 25 MG: 25 TABLET, FILM COATED ORAL at 10:07

## 2021-07-02 RX ADMIN — SODIUM BICARBONATE 1300 MG: 650 TABLET ORAL at 10:07

## 2021-07-02 RX ADMIN — ATORVASTATIN CALCIUM 80 MG: 40 TABLET, FILM COATED ORAL at 09:07

## 2021-07-02 RX ADMIN — SODIUM CHLORIDE: 0.9 INJECTION, SOLUTION INTRAVENOUS at 07:07

## 2021-07-02 RX ADMIN — HYDROCODONE BITARTRATE AND ACETAMINOPHEN 1 TABLET: 10; 325 TABLET ORAL at 11:07

## 2021-07-02 RX ADMIN — FERROUS SULFATE TAB EC 325 MG (65 MG FE EQUIVALENT) 325 MG: 325 (65 FE) TABLET DELAYED RESPONSE at 10:07

## 2021-07-02 RX ADMIN — INSULIN DETEMIR 10 UNITS: 100 INJECTION, SOLUTION SUBCUTANEOUS at 10:07

## 2021-07-02 RX ADMIN — MICONAZOLE NITRATE: 20 POWDER TOPICAL at 10:07

## 2021-07-02 RX ADMIN — MICONAZOLE NITRATE: 20 POWDER TOPICAL at 09:07

## 2021-07-02 RX ADMIN — SUMATRIPTAN SUCCINATE 50 MG: 50 TABLET ORAL at 08:07

## 2021-07-02 RX ADMIN — HYDROCODONE BITARTRATE AND ACETAMINOPHEN 1 TABLET: 10; 325 TABLET ORAL at 02:07

## 2021-07-02 RX ADMIN — INSULIN DETEMIR 10 UNITS: 100 INJECTION, SOLUTION SUBCUTANEOUS at 09:07

## 2021-07-02 RX ADMIN — HEPARIN SODIUM 7500 UNITS: 5000 INJECTION INTRAVENOUS; SUBCUTANEOUS at 02:07

## 2021-07-02 RX ADMIN — HEPARIN SODIUM 7500 UNITS: 5000 INJECTION INTRAVENOUS; SUBCUTANEOUS at 10:07

## 2021-07-02 RX ADMIN — INSULIN ASPART 10 UNITS: 100 INJECTION, SOLUTION INTRAVENOUS; SUBCUTANEOUS at 12:07

## 2021-07-02 RX ADMIN — METHOCARBAMOL 1500 MG: 750 TABLET ORAL at 02:07

## 2021-07-02 RX ADMIN — OXYBUTYNIN CHLORIDE 10 MG: 10 TABLET, EXTENDED RELEASE ORAL at 09:07

## 2021-07-02 RX ADMIN — LACTOBACILLUS ACIDOPHILUS / LACTOBACILLUS BULGARICUS 1 EACH: 100 MILLION CFU STRENGTH GRANULES at 10:07

## 2021-07-02 RX ADMIN — HYDRALAZINE HYDROCHLORIDE 50 MG: 50 TABLET ORAL at 02:07

## 2021-07-02 RX ADMIN — GEMFIBROZIL 600 MG: 600 TABLET ORAL at 05:07

## 2021-07-02 RX ADMIN — CEFTRIAXONE 2 G: 2 INJECTION, SOLUTION INTRAVENOUS at 02:07

## 2021-07-02 RX ADMIN — INSULIN ASPART 10 UNITS: 100 INJECTION, SOLUTION INTRAVENOUS; SUBCUTANEOUS at 05:07

## 2021-07-02 RX ADMIN — METHOCARBAMOL 1500 MG: 750 TABLET ORAL at 05:07

## 2021-07-02 RX ADMIN — SODIUM ZIRCONIUM CYCLOSILICATE 10 G: 10 POWDER, FOR SUSPENSION ORAL at 02:07

## 2021-07-02 RX ADMIN — TRAZODONE HYDROCHLORIDE 100 MG: 100 TABLET ORAL at 10:07

## 2021-07-02 RX ADMIN — PROPRANOLOL HYDROCHLORIDE 80 MG: 80 CAPSULE, EXTENDED RELEASE ORAL at 09:07

## 2021-07-03 LAB
ALBUMIN SERPL BCP-MCNC: 1.9 G/DL (ref 3.5–5.2)
ALP SERPL-CCNC: 127 U/L (ref 55–135)
ALT SERPL W/O P-5'-P-CCNC: 11 U/L (ref 10–44)
ANION GAP SERPL CALC-SCNC: 9 MMOL/L (ref 8–16)
AST SERPL-CCNC: 8 U/L (ref 10–40)
BASOPHILS # BLD AUTO: 0.03 K/UL (ref 0–0.2)
BASOPHILS NFR BLD: 0.4 % (ref 0–1.9)
BILIRUB SERPL-MCNC: 0.1 MG/DL (ref 0.1–1)
BUN SERPL-MCNC: 29 MG/DL (ref 8–23)
CALCIUM SERPL-MCNC: 7.9 MG/DL (ref 8.7–10.5)
CHLORIDE SERPL-SCNC: 99 MMOL/L (ref 95–110)
CO2 SERPL-SCNC: 19 MMOL/L (ref 23–29)
CREAT SERPL-MCNC: 2.6 MG/DL (ref 0.5–1.4)
DIFFERENTIAL METHOD: ABNORMAL
EOSINOPHIL # BLD AUTO: 0.3 K/UL (ref 0–0.5)
EOSINOPHIL NFR BLD: 4 % (ref 0–8)
ERYTHROCYTE [DISTWIDTH] IN BLOOD BY AUTOMATED COUNT: 14.6 % (ref 11.5–14.5)
EST. GFR  (AFRICAN AMERICAN): 21.1 ML/MIN/1.73 M^2
EST. GFR  (NON AFRICAN AMERICAN): 18.3 ML/MIN/1.73 M^2
GLUCOSE SERPL-MCNC: 154 MG/DL (ref 70–110)
HCT VFR BLD AUTO: 24.6 % (ref 37–48.5)
HGB BLD-MCNC: 7.6 G/DL (ref 12–16)
IMM GRANULOCYTES # BLD AUTO: 0.05 K/UL (ref 0–0.04)
IMM GRANULOCYTES NFR BLD AUTO: 0.7 % (ref 0–0.5)
LYMPHOCYTES # BLD AUTO: 1.9 K/UL (ref 1–4.8)
LYMPHOCYTES NFR BLD: 25.5 % (ref 18–48)
MAGNESIUM SERPL-MCNC: 1.7 MG/DL (ref 1.6–2.6)
MCH RBC QN AUTO: 28.6 PG (ref 27–31)
MCHC RBC AUTO-ENTMCNC: 30.9 G/DL (ref 32–36)
MCV RBC AUTO: 93 FL (ref 82–98)
MONOCYTES # BLD AUTO: 0.7 K/UL (ref 0.3–1)
MONOCYTES NFR BLD: 9.3 % (ref 4–15)
NEUTROPHILS # BLD AUTO: 4.5 K/UL (ref 1.8–7.7)
NEUTROPHILS NFR BLD: 60.1 % (ref 38–73)
NRBC BLD-RTO: 0 /100 WBC
OSMOLALITY SERPL: 289 MOSM/KG (ref 275–295)
PHOSPHATE SERPL-MCNC: 4.8 MG/DL (ref 2.7–4.5)
PLATELET # BLD AUTO: 288 K/UL (ref 150–450)
PMV BLD AUTO: 9.4 FL (ref 9.2–12.9)
POCT GLUCOSE: 124 MG/DL (ref 70–110)
POCT GLUCOSE: 131 MG/DL (ref 70–110)
POCT GLUCOSE: 218 MG/DL (ref 70–110)
POCT GLUCOSE: 83 MG/DL (ref 70–110)
POTASSIUM SERPL-SCNC: 5.6 MMOL/L (ref 3.5–5.1)
PROT SERPL-MCNC: 5.3 G/DL (ref 6–8.4)
RBC # BLD AUTO: 2.66 M/UL (ref 4–5.4)
SODIUM SERPL-SCNC: 127 MMOL/L (ref 136–145)
TB INDURATION 48 - 72 HR READ: 0 MM
WBC # BLD AUTO: 7.42 K/UL (ref 3.9–12.7)

## 2021-07-03 PROCEDURE — 97530 THERAPEUTIC ACTIVITIES: CPT

## 2021-07-03 PROCEDURE — 99233 PR SUBSEQUENT HOSPITAL CARE,LEVL III: ICD-10-PCS | Mod: ,,, | Performed by: STUDENT IN AN ORGANIZED HEALTH CARE EDUCATION/TRAINING PROGRAM

## 2021-07-03 PROCEDURE — 83735 ASSAY OF MAGNESIUM: CPT | Performed by: HOSPITALIST

## 2021-07-03 PROCEDURE — 11000001 HC ACUTE MED/SURG PRIVATE ROOM

## 2021-07-03 PROCEDURE — 63600175 PHARM REV CODE 636 W HCPCS: Performed by: STUDENT IN AN ORGANIZED HEALTH CARE EDUCATION/TRAINING PROGRAM

## 2021-07-03 PROCEDURE — 36415 COLL VENOUS BLD VENIPUNCTURE: CPT | Performed by: HOSPITALIST

## 2021-07-03 PROCEDURE — 99233 SBSQ HOSP IP/OBS HIGH 50: CPT | Mod: ,,, | Performed by: STUDENT IN AN ORGANIZED HEALTH CARE EDUCATION/TRAINING PROGRAM

## 2021-07-03 PROCEDURE — 25000003 PHARM REV CODE 250: Performed by: STUDENT IN AN ORGANIZED HEALTH CARE EDUCATION/TRAINING PROGRAM

## 2021-07-03 PROCEDURE — 84100 ASSAY OF PHOSPHORUS: CPT | Performed by: HOSPITALIST

## 2021-07-03 PROCEDURE — 80053 COMPREHEN METABOLIC PANEL: CPT | Performed by: HOSPITALIST

## 2021-07-03 PROCEDURE — 97164 PT RE-EVAL EST PLAN CARE: CPT

## 2021-07-03 PROCEDURE — 63600175 PHARM REV CODE 636 W HCPCS: Performed by: HOSPITALIST

## 2021-07-03 PROCEDURE — 83930 ASSAY OF BLOOD OSMOLALITY: CPT | Performed by: HOSPITALIST

## 2021-07-03 PROCEDURE — 85025 COMPLETE CBC W/AUTO DIFF WBC: CPT | Performed by: HOSPITALIST

## 2021-07-03 PROCEDURE — 25000003 PHARM REV CODE 250: Performed by: HOSPITALIST

## 2021-07-03 RX ORDER — CEFADROXIL 500 MG/1
500 CAPSULE ORAL EVERY 12 HOURS
Status: COMPLETED | OUTPATIENT
Start: 2021-07-03 | End: 2021-07-05

## 2021-07-03 RX ORDER — NIFEDIPINE 30 MG/1
120 TABLET, EXTENDED RELEASE ORAL NIGHTLY
Status: DISCONTINUED | OUTPATIENT
Start: 2021-07-03 | End: 2021-07-07 | Stop reason: HOSPADM

## 2021-07-03 RX ORDER — MAGNESIUM SULFATE HEPTAHYDRATE 40 MG/ML
2 INJECTION, SOLUTION INTRAVENOUS ONCE
Status: COMPLETED | OUTPATIENT
Start: 2021-07-03 | End: 2021-07-03

## 2021-07-03 RX ADMIN — INSULIN ASPART 10 UNITS: 100 INJECTION, SOLUTION INTRAVENOUS; SUBCUTANEOUS at 12:07

## 2021-07-03 RX ADMIN — CLONIDINE HYDROCHLORIDE 0.2 MG: 0.2 TABLET ORAL at 09:07

## 2021-07-03 RX ADMIN — LEVOTHYROXINE SODIUM 50 MCG: 50 TABLET ORAL at 05:07

## 2021-07-03 RX ADMIN — INSULIN DETEMIR 10 UNITS: 100 INJECTION, SOLUTION SUBCUTANEOUS at 08:07

## 2021-07-03 RX ADMIN — MICONAZOLE NITRATE: 20 POWDER TOPICAL at 09:07

## 2021-07-03 RX ADMIN — LACTOBACILLUS ACIDOPHILUS / LACTOBACILLUS BULGARICUS 1 EACH: 100 MILLION CFU STRENGTH GRANULES at 09:07

## 2021-07-03 RX ADMIN — ANASTROZOLE 1 MG: 1 TABLET, COATED ORAL at 09:07

## 2021-07-03 RX ADMIN — VENLAFAXINE HYDROCHLORIDE 150 MG: 75 CAPSULE, EXTENDED RELEASE ORAL at 09:07

## 2021-07-03 RX ADMIN — Medication 400 MG: at 09:07

## 2021-07-03 RX ADMIN — HEPARIN SODIUM 7500 UNITS: 5000 INJECTION INTRAVENOUS; SUBCUTANEOUS at 10:07

## 2021-07-03 RX ADMIN — HYDROCODONE BITARTRATE AND ACETAMINOPHEN 1 TABLET: 10; 325 TABLET ORAL at 06:07

## 2021-07-03 RX ADMIN — OXYBUTYNIN CHLORIDE 10 MG: 10 TABLET, EXTENDED RELEASE ORAL at 09:07

## 2021-07-03 RX ADMIN — FERROUS SULFATE TAB EC 325 MG (65 MG FE EQUIVALENT) 325 MG: 325 (65 FE) TABLET DELAYED RESPONSE at 10:07

## 2021-07-03 RX ADMIN — METHOCARBAMOL 1500 MG: 750 TABLET ORAL at 06:07

## 2021-07-03 RX ADMIN — LACTOBACILLUS ACIDOPHILUS / LACTOBACILLUS BULGARICUS 1 EACH: 100 MILLION CFU STRENGTH GRANULES at 10:07

## 2021-07-03 RX ADMIN — HYDRALAZINE HYDROCHLORIDE 25 MG: 25 TABLET, FILM COATED ORAL at 05:07

## 2021-07-03 RX ADMIN — DOCUSATE SODIUM 50 MG AND SENNOSIDES 8.6 MG 1 TABLET: 8.6; 5 TABLET, FILM COATED ORAL at 10:07

## 2021-07-03 RX ADMIN — INSULIN DETEMIR 10 UNITS: 100 INJECTION, SOLUTION SUBCUTANEOUS at 10:07

## 2021-07-03 RX ADMIN — HEPARIN SODIUM 7500 UNITS: 5000 INJECTION INTRAVENOUS; SUBCUTANEOUS at 02:07

## 2021-07-03 RX ADMIN — CLONIDINE HYDROCHLORIDE 0.2 MG: 0.2 TABLET ORAL at 10:07

## 2021-07-03 RX ADMIN — CEFADROXIL 500 MG: 500 CAPSULE ORAL at 10:07

## 2021-07-03 RX ADMIN — HYDROCODONE BITARTRATE AND ACETAMINOPHEN 1 TABLET: 10; 325 TABLET ORAL at 10:07

## 2021-07-03 RX ADMIN — HEPARIN SODIUM 7500 UNITS: 5000 INJECTION INTRAVENOUS; SUBCUTANEOUS at 05:07

## 2021-07-03 RX ADMIN — SODIUM ZIRCONIUM CYCLOSILICATE 10 G: 10 POWDER, FOR SUSPENSION ORAL at 10:07

## 2021-07-03 RX ADMIN — SODIUM BICARBONATE 1300 MG: 650 TABLET ORAL at 02:07

## 2021-07-03 RX ADMIN — DOCUSATE SODIUM 50 MG AND SENNOSIDES 8.6 MG 1 TABLET: 8.6; 5 TABLET, FILM COATED ORAL at 09:07

## 2021-07-03 RX ADMIN — TRAZODONE HYDROCHLORIDE 100 MG: 100 TABLET ORAL at 10:07

## 2021-07-03 RX ADMIN — SODIUM BICARBONATE 1300 MG: 650 TABLET ORAL at 09:07

## 2021-07-03 RX ADMIN — INSULIN ASPART 2 UNITS: 100 INJECTION, SOLUTION INTRAVENOUS; SUBCUTANEOUS at 10:07

## 2021-07-03 RX ADMIN — CLONIDINE HYDROCHLORIDE 0.2 MG: 0.2 TABLET ORAL at 02:07

## 2021-07-03 RX ADMIN — METHOCARBAMOL 1500 MG: 750 TABLET ORAL at 10:07

## 2021-07-03 RX ADMIN — MAGNESIUM SULFATE 2 G: 2 INJECTION INTRAVENOUS at 10:07

## 2021-07-03 RX ADMIN — SODIUM BICARBONATE 1300 MG: 650 TABLET ORAL at 10:07

## 2021-07-03 RX ADMIN — Medication 400 MG: at 10:07

## 2021-07-03 RX ADMIN — PROPRANOLOL HYDROCHLORIDE 80 MG: 80 CAPSULE, EXTENDED RELEASE ORAL at 09:07

## 2021-07-03 RX ADMIN — ATORVASTATIN CALCIUM 80 MG: 40 TABLET, FILM COATED ORAL at 09:07

## 2021-07-03 RX ADMIN — INSULIN ASPART 10 UNITS: 100 INJECTION, SOLUTION INTRAVENOUS; SUBCUTANEOUS at 08:07

## 2021-07-04 PROBLEM — E87.5 HYPERKALEMIA: Status: ACTIVE | Noted: 2021-07-04

## 2021-07-04 LAB
ALBUMIN SERPL BCP-MCNC: 2.1 G/DL (ref 3.5–5.2)
ALP SERPL-CCNC: 128 U/L (ref 55–135)
ALT SERPL W/O P-5'-P-CCNC: 8 U/L (ref 10–44)
ANION GAP SERPL CALC-SCNC: 10 MMOL/L (ref 8–16)
ANION GAP SERPL CALC-SCNC: 9 MMOL/L (ref 8–16)
AST SERPL-CCNC: 9 U/L (ref 10–40)
BACTERIA BLD CULT: ABNORMAL
BASOPHILS # BLD AUTO: 0.03 K/UL (ref 0–0.2)
BASOPHILS NFR BLD: 0.4 % (ref 0–1.9)
BILIRUB SERPL-MCNC: 0.2 MG/DL (ref 0.1–1)
BUN SERPL-MCNC: 28 MG/DL (ref 8–23)
BUN SERPL-MCNC: 30 MG/DL (ref 8–23)
CALCIUM SERPL-MCNC: 8.3 MG/DL (ref 8.7–10.5)
CALCIUM SERPL-MCNC: 8.5 MG/DL (ref 8.7–10.5)
CHLORIDE SERPL-SCNC: 97 MMOL/L (ref 95–110)
CHLORIDE SERPL-SCNC: 98 MMOL/L (ref 95–110)
CO2 SERPL-SCNC: 17 MMOL/L (ref 23–29)
CO2 SERPL-SCNC: 22 MMOL/L (ref 23–29)
CREAT SERPL-MCNC: 2.1 MG/DL (ref 0.5–1.4)
CREAT SERPL-MCNC: 2.4 MG/DL (ref 0.5–1.4)
DIFFERENTIAL METHOD: ABNORMAL
EOSINOPHIL # BLD AUTO: 0.4 K/UL (ref 0–0.5)
EOSINOPHIL NFR BLD: 6.1 % (ref 0–8)
ERYTHROCYTE [DISTWIDTH] IN BLOOD BY AUTOMATED COUNT: 14.5 % (ref 11.5–14.5)
EST. GFR  (AFRICAN AMERICAN): 23.2 ML/MIN/1.73 M^2
EST. GFR  (AFRICAN AMERICAN): 27.3 ML/MIN/1.73 M^2
EST. GFR  (NON AFRICAN AMERICAN): 20.1 ML/MIN/1.73 M^2
EST. GFR  (NON AFRICAN AMERICAN): 23.7 ML/MIN/1.73 M^2
GLUCOSE SERPL-MCNC: 136 MG/DL (ref 70–110)
GLUCOSE SERPL-MCNC: 139 MG/DL (ref 70–110)
HCT VFR BLD AUTO: 24.1 % (ref 37–48.5)
HGB BLD-MCNC: 7.6 G/DL (ref 12–16)
IMM GRANULOCYTES # BLD AUTO: 0.05 K/UL (ref 0–0.04)
IMM GRANULOCYTES NFR BLD AUTO: 0.7 % (ref 0–0.5)
LYMPHOCYTES # BLD AUTO: 1.9 K/UL (ref 1–4.8)
LYMPHOCYTES NFR BLD: 28.7 % (ref 18–48)
MAGNESIUM SERPL-MCNC: 2 MG/DL (ref 1.6–2.6)
MCH RBC QN AUTO: 28.6 PG (ref 27–31)
MCHC RBC AUTO-ENTMCNC: 31.5 G/DL (ref 32–36)
MCV RBC AUTO: 91 FL (ref 82–98)
MONOCYTES # BLD AUTO: 0.7 K/UL (ref 0.3–1)
MONOCYTES NFR BLD: 9.8 % (ref 4–15)
NEUTROPHILS # BLD AUTO: 3.7 K/UL (ref 1.8–7.7)
NEUTROPHILS NFR BLD: 54.3 % (ref 38–73)
NRBC BLD-RTO: 0 /100 WBC
PHOSPHATE SERPL-MCNC: 4.2 MG/DL (ref 2.7–4.5)
PLATELET # BLD AUTO: 301 K/UL (ref 150–450)
PMV BLD AUTO: 9.4 FL (ref 9.2–12.9)
POCT GLUCOSE: 124 MG/DL (ref 70–110)
POCT GLUCOSE: 137 MG/DL (ref 70–110)
POCT GLUCOSE: 163 MG/DL (ref 70–110)
POTASSIUM SERPL-SCNC: 5.2 MMOL/L (ref 3.5–5.1)
POTASSIUM SERPL-SCNC: 5.3 MMOL/L (ref 3.5–5.1)
PROT SERPL-MCNC: 5.4 G/DL (ref 6–8.4)
RBC # BLD AUTO: 2.66 M/UL (ref 4–5.4)
SODIUM SERPL-SCNC: 125 MMOL/L (ref 136–145)
SODIUM SERPL-SCNC: 128 MMOL/L (ref 136–145)
WBC # BLD AUTO: 6.75 K/UL (ref 3.9–12.7)

## 2021-07-04 PROCEDURE — 80048 BASIC METABOLIC PNL TOTAL CA: CPT | Performed by: INTERNAL MEDICINE

## 2021-07-04 PROCEDURE — 25000003 PHARM REV CODE 250: Performed by: STUDENT IN AN ORGANIZED HEALTH CARE EDUCATION/TRAINING PROGRAM

## 2021-07-04 PROCEDURE — 11000001 HC ACUTE MED/SURG PRIVATE ROOM

## 2021-07-04 PROCEDURE — 99233 SBSQ HOSP IP/OBS HIGH 50: CPT | Mod: ,,, | Performed by: STUDENT IN AN ORGANIZED HEALTH CARE EDUCATION/TRAINING PROGRAM

## 2021-07-04 PROCEDURE — 80053 COMPREHEN METABOLIC PANEL: CPT | Performed by: HOSPITALIST

## 2021-07-04 PROCEDURE — 36415 COLL VENOUS BLD VENIPUNCTURE: CPT | Performed by: HOSPITALIST

## 2021-07-04 PROCEDURE — 25000003 PHARM REV CODE 250: Performed by: HOSPITALIST

## 2021-07-04 PROCEDURE — 99233 PR SUBSEQUENT HOSPITAL CARE,LEVL III: ICD-10-PCS | Mod: ,,, | Performed by: STUDENT IN AN ORGANIZED HEALTH CARE EDUCATION/TRAINING PROGRAM

## 2021-07-04 PROCEDURE — 99223 1ST HOSP IP/OBS HIGH 75: CPT | Mod: GC,,, | Performed by: INTERNAL MEDICINE

## 2021-07-04 PROCEDURE — 85025 COMPLETE CBC W/AUTO DIFF WBC: CPT | Performed by: HOSPITALIST

## 2021-07-04 PROCEDURE — 36415 COLL VENOUS BLD VENIPUNCTURE: CPT | Performed by: INTERNAL MEDICINE

## 2021-07-04 PROCEDURE — 99223 PR INITIAL HOSPITAL CARE,LEVL III: ICD-10-PCS | Mod: GC,,, | Performed by: INTERNAL MEDICINE

## 2021-07-04 PROCEDURE — 83735 ASSAY OF MAGNESIUM: CPT | Performed by: HOSPITALIST

## 2021-07-04 PROCEDURE — 84100 ASSAY OF PHOSPHORUS: CPT | Performed by: HOSPITALIST

## 2021-07-04 PROCEDURE — 63600175 PHARM REV CODE 636 W HCPCS: Performed by: HOSPITALIST

## 2021-07-04 RX ORDER — POLYETHYLENE GLYCOL 3350 17 G/17G
17 POWDER, FOR SOLUTION ORAL ONCE
Status: COMPLETED | OUTPATIENT
Start: 2021-07-04 | End: 2021-07-04

## 2021-07-04 RX ORDER — SODIUM CHLORIDE 9 MG/ML
INJECTION, SOLUTION INTRAVENOUS CONTINUOUS
Status: ACTIVE | OUTPATIENT
Start: 2021-07-04 | End: 2021-07-05

## 2021-07-04 RX ADMIN — LACTOBACILLUS ACIDOPHILUS / LACTOBACILLUS BULGARICUS 1 EACH: 100 MILLION CFU STRENGTH GRANULES at 10:07

## 2021-07-04 RX ADMIN — HYDROCODONE BITARTRATE AND ACETAMINOPHEN 1 TABLET: 10; 325 TABLET ORAL at 01:07

## 2021-07-04 RX ADMIN — CEFADROXIL 500 MG: 500 CAPSULE ORAL at 10:07

## 2021-07-04 RX ADMIN — PROPRANOLOL HYDROCHLORIDE 80 MG: 80 CAPSULE, EXTENDED RELEASE ORAL at 01:07

## 2021-07-04 RX ADMIN — CLONIDINE HYDROCHLORIDE 0.2 MG: 0.2 TABLET ORAL at 03:07

## 2021-07-04 RX ADMIN — ATORVASTATIN CALCIUM 80 MG: 40 TABLET, FILM COATED ORAL at 10:07

## 2021-07-04 RX ADMIN — LEVOTHYROXINE SODIUM 50 MCG: 50 TABLET ORAL at 05:07

## 2021-07-04 RX ADMIN — BUTALBITAL, ACETAMINOPHEN, AND CAFFEINE 1 TABLET: 50; 325; 40 TABLET ORAL at 01:07

## 2021-07-04 RX ADMIN — POLYETHYLENE GLYCOL 3350 17 G: 17 POWDER, FOR SOLUTION ORAL at 01:07

## 2021-07-04 RX ADMIN — NIFEDIPINE 120 MG: 30 TABLET, FILM COATED, EXTENDED RELEASE ORAL at 10:07

## 2021-07-04 RX ADMIN — CLONIDINE HYDROCHLORIDE 0.2 MG: 0.2 TABLET ORAL at 10:07

## 2021-07-04 RX ADMIN — METHOCARBAMOL 1500 MG: 750 TABLET ORAL at 10:07

## 2021-07-04 RX ADMIN — Medication 400 MG: at 10:07

## 2021-07-04 RX ADMIN — SODIUM BICARBONATE 1300 MG: 650 TABLET ORAL at 03:07

## 2021-07-04 RX ADMIN — HEPARIN SODIUM 7500 UNITS: 5000 INJECTION INTRAVENOUS; SUBCUTANEOUS at 10:07

## 2021-07-04 RX ADMIN — ANASTROZOLE 1 MG: 1 TABLET, COATED ORAL at 10:07

## 2021-07-04 RX ADMIN — DOCUSATE SODIUM 50 MG AND SENNOSIDES 8.6 MG 1 TABLET: 8.6; 5 TABLET, FILM COATED ORAL at 10:07

## 2021-07-04 RX ADMIN — INSULIN ASPART 10 UNITS: 100 INJECTION, SOLUTION INTRAVENOUS; SUBCUTANEOUS at 11:07

## 2021-07-04 RX ADMIN — MICONAZOLE NITRATE: 20 POWDER TOPICAL at 10:07

## 2021-07-04 RX ADMIN — SODIUM CHLORIDE: 0.9 INJECTION, SOLUTION INTRAVENOUS at 05:07

## 2021-07-04 RX ADMIN — OXYBUTYNIN CHLORIDE 10 MG: 10 TABLET, EXTENDED RELEASE ORAL at 10:07

## 2021-07-04 RX ADMIN — HEPARIN SODIUM 7500 UNITS: 5000 INJECTION INTRAVENOUS; SUBCUTANEOUS at 05:07

## 2021-07-04 RX ADMIN — INSULIN DETEMIR 10 UNITS: 100 INJECTION, SOLUTION SUBCUTANEOUS at 10:07

## 2021-07-04 RX ADMIN — HYDROCODONE BITARTRATE AND ACETAMINOPHEN 1 TABLET: 10; 325 TABLET ORAL at 08:07

## 2021-07-04 RX ADMIN — HEPARIN SODIUM 7500 UNITS: 5000 INJECTION INTRAVENOUS; SUBCUTANEOUS at 03:07

## 2021-07-04 RX ADMIN — INSULIN ASPART 10 UNITS: 100 INJECTION, SOLUTION INTRAVENOUS; SUBCUTANEOUS at 04:07

## 2021-07-04 RX ADMIN — VENLAFAXINE HYDROCHLORIDE 150 MG: 75 CAPSULE, EXTENDED RELEASE ORAL at 10:07

## 2021-07-04 RX ADMIN — SODIUM BICARBONATE 1300 MG: 650 TABLET ORAL at 10:07

## 2021-07-04 RX ADMIN — HYDROCODONE BITARTRATE AND ACETAMINOPHEN 1 TABLET: 10; 325 TABLET ORAL at 04:07

## 2021-07-04 RX ADMIN — METHOCARBAMOL 1500 MG: 750 TABLET ORAL at 01:07

## 2021-07-04 RX ADMIN — BUTALBITAL, ACETAMINOPHEN, AND CAFFEINE 1 TABLET: 50; 325; 40 TABLET ORAL at 06:07

## 2021-07-04 RX ADMIN — TRAZODONE HYDROCHLORIDE 100 MG: 100 TABLET ORAL at 10:07

## 2021-07-04 RX ADMIN — INSULIN ASPART 10 UNITS: 100 INJECTION, SOLUTION INTRAVENOUS; SUBCUTANEOUS at 07:07

## 2021-07-04 RX ADMIN — SODIUM ZIRCONIUM CYCLOSILICATE 10 G: 10 POWDER, FOR SUSPENSION ORAL at 01:07

## 2021-07-05 LAB
ALBUMIN SERPL BCP-MCNC: 1.9 G/DL (ref 3.5–5.2)
ALP SERPL-CCNC: 114 U/L (ref 55–135)
ALT SERPL W/O P-5'-P-CCNC: 9 U/L (ref 10–44)
ANION GAP SERPL CALC-SCNC: 9 MMOL/L (ref 8–16)
AST SERPL-CCNC: 11 U/L (ref 10–40)
BACTERIA BLD CULT: ABNORMAL
BASOPHILS # BLD AUTO: 0.04 K/UL (ref 0–0.2)
BASOPHILS NFR BLD: 0.6 % (ref 0–1.9)
BILIRUB SERPL-MCNC: 0.2 MG/DL (ref 0.1–1)
BUN SERPL-MCNC: 27 MG/DL (ref 8–23)
CALCIUM SERPL-MCNC: 8.1 MG/DL (ref 8.7–10.5)
CHLORIDE SERPL-SCNC: 100 MMOL/L (ref 95–110)
CO2 SERPL-SCNC: 20 MMOL/L (ref 23–29)
CREAT SERPL-MCNC: 1.9 MG/DL (ref 0.5–1.4)
CREAT UR-MCNC: 24 MG/DL (ref 15–325)
DIFFERENTIAL METHOD: ABNORMAL
EOSINOPHIL # BLD AUTO: 0.5 K/UL (ref 0–0.5)
EOSINOPHIL NFR BLD: 7.3 % (ref 0–8)
ERYTHROCYTE [DISTWIDTH] IN BLOOD BY AUTOMATED COUNT: 14.3 % (ref 11.5–14.5)
EST. GFR  (AFRICAN AMERICAN): 30.8 ML/MIN/1.73 M^2
EST. GFR  (NON AFRICAN AMERICAN): 26.7 ML/MIN/1.73 M^2
GLUCOSE SERPL-MCNC: 103 MG/DL (ref 70–110)
HCT VFR BLD AUTO: 22.9 % (ref 37–48.5)
HGB BLD-MCNC: 7.3 G/DL (ref 12–16)
IMM GRANULOCYTES # BLD AUTO: 0.04 K/UL (ref 0–0.04)
IMM GRANULOCYTES NFR BLD AUTO: 0.6 % (ref 0–0.5)
LYMPHOCYTES # BLD AUTO: 1.8 K/UL (ref 1–4.8)
LYMPHOCYTES NFR BLD: 28.9 % (ref 18–48)
MAGNESIUM SERPL-MCNC: 1.9 MG/DL (ref 1.6–2.6)
MCH RBC QN AUTO: 28.7 PG (ref 27–31)
MCHC RBC AUTO-ENTMCNC: 31.9 G/DL (ref 32–36)
MCV RBC AUTO: 90 FL (ref 82–98)
MONOCYTES # BLD AUTO: 0.7 K/UL (ref 0.3–1)
MONOCYTES NFR BLD: 11.3 % (ref 4–15)
NEUTROPHILS # BLD AUTO: 3.2 K/UL (ref 1.8–7.7)
NEUTROPHILS NFR BLD: 51.3 % (ref 38–73)
NRBC BLD-RTO: 0 /100 WBC
OSMOLALITY SERPL: 283 MOSM/KG (ref 275–295)
OSMOLALITY UR: 155 MOSM/KG (ref 50–1200)
PHOSPHATE SERPL-MCNC: 4.1 MG/DL (ref 2.7–4.5)
PLATELET # BLD AUTO: 288 K/UL (ref 150–450)
PMV BLD AUTO: 9.4 FL (ref 9.2–12.9)
POCT GLUCOSE: 109 MG/DL (ref 70–110)
POCT GLUCOSE: 114 MG/DL (ref 70–110)
POCT GLUCOSE: 161 MG/DL (ref 70–110)
POCT GLUCOSE: 209 MG/DL (ref 70–110)
POCT GLUCOSE: 51 MG/DL (ref 70–110)
POTASSIUM SERPL-SCNC: 5.3 MMOL/L (ref 3.5–5.1)
PROT SERPL-MCNC: 5 G/DL (ref 6–8.4)
PROT UR-MCNC: 91 MG/DL (ref 0–15)
PROT/CREAT UR: 3.79 MG/G{CREAT} (ref 0–0.2)
RBC # BLD AUTO: 2.54 M/UL (ref 4–5.4)
SODIUM SERPL-SCNC: 129 MMOL/L (ref 136–145)
SODIUM UR-SCNC: 28 MMOL/L (ref 20–250)
WBC # BLD AUTO: 6.26 K/UL (ref 3.9–12.7)

## 2021-07-05 PROCEDURE — 36415 COLL VENOUS BLD VENIPUNCTURE: CPT | Performed by: STUDENT IN AN ORGANIZED HEALTH CARE EDUCATION/TRAINING PROGRAM

## 2021-07-05 PROCEDURE — 82570 ASSAY OF URINE CREATININE: CPT | Performed by: STUDENT IN AN ORGANIZED HEALTH CARE EDUCATION/TRAINING PROGRAM

## 2021-07-05 PROCEDURE — 83935 ASSAY OF URINE OSMOLALITY: CPT | Performed by: STUDENT IN AN ORGANIZED HEALTH CARE EDUCATION/TRAINING PROGRAM

## 2021-07-05 PROCEDURE — 99233 PR SUBSEQUENT HOSPITAL CARE,LEVL III: ICD-10-PCS | Mod: ,,, | Performed by: STUDENT IN AN ORGANIZED HEALTH CARE EDUCATION/TRAINING PROGRAM

## 2021-07-05 PROCEDURE — 63600175 PHARM REV CODE 636 W HCPCS: Performed by: HOSPITALIST

## 2021-07-05 PROCEDURE — 84100 ASSAY OF PHOSPHORUS: CPT | Performed by: HOSPITALIST

## 2021-07-05 PROCEDURE — 25000003 PHARM REV CODE 250: Performed by: HOSPITALIST

## 2021-07-05 PROCEDURE — 83735 ASSAY OF MAGNESIUM: CPT | Performed by: HOSPITALIST

## 2021-07-05 PROCEDURE — 84300 ASSAY OF URINE SODIUM: CPT | Performed by: STUDENT IN AN ORGANIZED HEALTH CARE EDUCATION/TRAINING PROGRAM

## 2021-07-05 PROCEDURE — 11000001 HC ACUTE MED/SURG PRIVATE ROOM

## 2021-07-05 PROCEDURE — 83930 ASSAY OF BLOOD OSMOLALITY: CPT | Performed by: STUDENT IN AN ORGANIZED HEALTH CARE EDUCATION/TRAINING PROGRAM

## 2021-07-05 PROCEDURE — 25000003 PHARM REV CODE 250: Performed by: STUDENT IN AN ORGANIZED HEALTH CARE EDUCATION/TRAINING PROGRAM

## 2021-07-05 PROCEDURE — 84244 ASSAY OF RENIN: CPT | Performed by: STUDENT IN AN ORGANIZED HEALTH CARE EDUCATION/TRAINING PROGRAM

## 2021-07-05 PROCEDURE — 99233 SBSQ HOSP IP/OBS HIGH 50: CPT | Mod: ,,, | Performed by: STUDENT IN AN ORGANIZED HEALTH CARE EDUCATION/TRAINING PROGRAM

## 2021-07-05 PROCEDURE — 85025 COMPLETE CBC W/AUTO DIFF WBC: CPT | Performed by: HOSPITALIST

## 2021-07-05 PROCEDURE — 80053 COMPREHEN METABOLIC PANEL: CPT | Performed by: HOSPITALIST

## 2021-07-05 RX ORDER — HYDRALAZINE HYDROCHLORIDE 25 MG/1
25 TABLET, FILM COATED ORAL EVERY 8 HOURS
Status: DISCONTINUED | OUTPATIENT
Start: 2021-07-05 | End: 2021-07-06

## 2021-07-05 RX ADMIN — OXYBUTYNIN CHLORIDE 10 MG: 10 TABLET, EXTENDED RELEASE ORAL at 08:07

## 2021-07-05 RX ADMIN — LACTOBACILLUS ACIDOPHILUS / LACTOBACILLUS BULGARICUS 1 EACH: 100 MILLION CFU STRENGTH GRANULES at 08:07

## 2021-07-05 RX ADMIN — FERROUS SULFATE TAB EC 325 MG (65 MG FE EQUIVALENT) 325 MG: 325 (65 FE) TABLET DELAYED RESPONSE at 08:07

## 2021-07-05 RX ADMIN — CEFADROXIL 500 MG: 500 CAPSULE ORAL at 08:07

## 2021-07-05 RX ADMIN — HYDROCODONE BITARTRATE AND ACETAMINOPHEN 1 TABLET: 10; 325 TABLET ORAL at 02:07

## 2021-07-05 RX ADMIN — HYDROCODONE BITARTRATE AND ACETAMINOPHEN 1 TABLET: 10; 325 TABLET ORAL at 12:07

## 2021-07-05 RX ADMIN — METHOCARBAMOL 1500 MG: 750 TABLET ORAL at 12:07

## 2021-07-05 RX ADMIN — SODIUM BICARBONATE 1300 MG: 650 TABLET ORAL at 09:07

## 2021-07-05 RX ADMIN — VENLAFAXINE HYDROCHLORIDE 150 MG: 75 CAPSULE, EXTENDED RELEASE ORAL at 08:07

## 2021-07-05 RX ADMIN — PROPRANOLOL HYDROCHLORIDE 80 MG: 80 CAPSULE, EXTENDED RELEASE ORAL at 08:07

## 2021-07-05 RX ADMIN — Medication 400 MG: at 08:07

## 2021-07-05 RX ADMIN — ANASTROZOLE 1 MG: 1 TABLET, COATED ORAL at 08:07

## 2021-07-05 RX ADMIN — HEPARIN SODIUM 7500 UNITS: 5000 INJECTION INTRAVENOUS; SUBCUTANEOUS at 06:07

## 2021-07-05 RX ADMIN — HEPARIN SODIUM 7500 UNITS: 5000 INJECTION INTRAVENOUS; SUBCUTANEOUS at 10:07

## 2021-07-05 RX ADMIN — SODIUM CHLORIDE: 0.9 INJECTION, SOLUTION INTRAVENOUS at 08:07

## 2021-07-05 RX ADMIN — HEPARIN SODIUM 7500 UNITS: 5000 INJECTION INTRAVENOUS; SUBCUTANEOUS at 02:07

## 2021-07-05 RX ADMIN — DOCUSATE SODIUM 50 MG AND SENNOSIDES 8.6 MG 1 TABLET: 8.6; 5 TABLET, FILM COATED ORAL at 08:07

## 2021-07-05 RX ADMIN — MICONAZOLE NITRATE: 20 POWDER TOPICAL at 08:07

## 2021-07-05 RX ADMIN — NIFEDIPINE 120 MG: 30 TABLET, FILM COATED, EXTENDED RELEASE ORAL at 08:07

## 2021-07-05 RX ADMIN — HYDROCODONE BITARTRATE AND ACETAMINOPHEN 1 TABLET: 10; 325 TABLET ORAL at 08:07

## 2021-07-05 RX ADMIN — CLONIDINE HYDROCHLORIDE 0.2 MG: 0.2 TABLET ORAL at 08:07

## 2021-07-05 RX ADMIN — INSULIN ASPART 10 UNITS: 100 INJECTION, SOLUTION INTRAVENOUS; SUBCUTANEOUS at 12:07

## 2021-07-05 RX ADMIN — METHOCARBAMOL 1500 MG: 750 TABLET ORAL at 08:07

## 2021-07-05 RX ADMIN — MICONAZOLE NITRATE: 20 POWDER TOPICAL at 09:07

## 2021-07-05 RX ADMIN — SODIUM BICARBONATE 1300 MG: 650 TABLET ORAL at 08:07

## 2021-07-05 RX ADMIN — CLONIDINE HYDROCHLORIDE 0.2 MG: 0.2 TABLET ORAL at 02:07

## 2021-07-05 RX ADMIN — LEVOTHYROXINE SODIUM 50 MCG: 50 TABLET ORAL at 06:07

## 2021-07-05 RX ADMIN — INSULIN DETEMIR 10 UNITS: 100 INJECTION, SOLUTION SUBCUTANEOUS at 09:07

## 2021-07-05 RX ADMIN — TRAZODONE HYDROCHLORIDE 100 MG: 100 TABLET ORAL at 08:07

## 2021-07-05 RX ADMIN — SODIUM ZIRCONIUM CYCLOSILICATE 10 G: 10 POWDER, FOR SUSPENSION ORAL at 10:07

## 2021-07-05 RX ADMIN — GEMFIBROZIL 600 MG: 600 TABLET ORAL at 06:07

## 2021-07-05 RX ADMIN — HYDRALAZINE HYDROCHLORIDE 25 MG: 25 TABLET, FILM COATED ORAL at 08:07

## 2021-07-05 RX ADMIN — ATORVASTATIN CALCIUM 80 MG: 40 TABLET, FILM COATED ORAL at 08:07

## 2021-07-05 RX ADMIN — INSULIN ASPART 10 UNITS: 100 INJECTION, SOLUTION INTRAVENOUS; SUBCUTANEOUS at 08:07

## 2021-07-05 RX ADMIN — INSULIN ASPART 4 UNITS: 100 INJECTION, SOLUTION INTRAVENOUS; SUBCUTANEOUS at 12:07

## 2021-07-05 RX ADMIN — INSULIN DETEMIR 10 UNITS: 100 INJECTION, SOLUTION SUBCUTANEOUS at 08:07

## 2021-07-05 RX ADMIN — SODIUM BICARBONATE 1300 MG: 650 TABLET ORAL at 03:07

## 2021-07-06 ENCOUNTER — EXTERNAL HOME HEALTH (OUTPATIENT)
Dept: HOME HEALTH SERVICES | Facility: HOSPITAL | Age: 71
End: 2021-07-06
Payer: MEDICARE

## 2021-07-06 ENCOUNTER — PATIENT MESSAGE (OUTPATIENT)
Dept: ADMINISTRATIVE | Facility: HOSPITAL | Age: 71
End: 2021-07-06

## 2021-07-06 PROBLEM — N20.1 LEFT URETERAL STONE: Status: RESOLVED | Noted: 2021-06-30 | Resolved: 2021-07-06

## 2021-07-06 PROBLEM — R11.2 NAUSEA AND VOMITING: Status: RESOLVED | Noted: 2021-06-30 | Resolved: 2021-07-06

## 2021-07-06 LAB
ALBUMIN SERPL BCP-MCNC: 2.1 G/DL (ref 3.5–5.2)
ALP SERPL-CCNC: 126 U/L (ref 55–135)
ALT SERPL W/O P-5'-P-CCNC: 11 U/L (ref 10–44)
ANION GAP SERPL CALC-SCNC: 8 MMOL/L (ref 8–16)
AST SERPL-CCNC: 10 U/L (ref 10–40)
BACTERIA BLD CULT: NORMAL
BACTERIA BLD CULT: NORMAL
BASOPHILS # BLD AUTO: 0.04 K/UL (ref 0–0.2)
BASOPHILS NFR BLD: 0.6 % (ref 0–1.9)
BILIRUB SERPL-MCNC: 0.1 MG/DL (ref 0.1–1)
BUN SERPL-MCNC: 27 MG/DL (ref 8–23)
CALCIUM SERPL-MCNC: 8.4 MG/DL (ref 8.7–10.5)
CHLORIDE SERPL-SCNC: 101 MMOL/L (ref 95–110)
CO2 SERPL-SCNC: 20 MMOL/L (ref 23–29)
CREAT SERPL-MCNC: 1.7 MG/DL (ref 0.5–1.4)
DIFFERENTIAL METHOD: ABNORMAL
EOSINOPHIL # BLD AUTO: 0.4 K/UL (ref 0–0.5)
EOSINOPHIL NFR BLD: 6.5 % (ref 0–8)
ERYTHROCYTE [DISTWIDTH] IN BLOOD BY AUTOMATED COUNT: 14.5 % (ref 11.5–14.5)
EST. GFR  (AFRICAN AMERICAN): 35.2 ML/MIN/1.73 M^2
EST. GFR  (NON AFRICAN AMERICAN): 30.6 ML/MIN/1.73 M^2
GLUCOSE SERPL-MCNC: 113 MG/DL (ref 70–110)
HCT VFR BLD AUTO: 24.8 % (ref 37–48.5)
HGB BLD-MCNC: 8 G/DL (ref 12–16)
IMM GRANULOCYTES # BLD AUTO: 0.06 K/UL (ref 0–0.04)
IMM GRANULOCYTES NFR BLD AUTO: 0.9 % (ref 0–0.5)
LYMPHOCYTES # BLD AUTO: 1.8 K/UL (ref 1–4.8)
LYMPHOCYTES NFR BLD: 27.8 % (ref 18–48)
MAGNESIUM SERPL-MCNC: 2 MG/DL (ref 1.6–2.6)
MCH RBC QN AUTO: 28.8 PG (ref 27–31)
MCHC RBC AUTO-ENTMCNC: 32.3 G/DL (ref 32–36)
MCV RBC AUTO: 89 FL (ref 82–98)
MONOCYTES # BLD AUTO: 0.7 K/UL (ref 0.3–1)
MONOCYTES NFR BLD: 10.6 % (ref 4–15)
NEUTROPHILS # BLD AUTO: 3.5 K/UL (ref 1.8–7.7)
NEUTROPHILS NFR BLD: 53.6 % (ref 38–73)
NRBC BLD-RTO: 0 /100 WBC
PHOSPHATE SERPL-MCNC: 4 MG/DL (ref 2.7–4.5)
PLATELET # BLD AUTO: 324 K/UL (ref 150–450)
PMV BLD AUTO: 9.6 FL (ref 9.2–12.9)
POCT GLUCOSE: 117 MG/DL (ref 70–110)
POCT GLUCOSE: 155 MG/DL (ref 70–110)
POCT GLUCOSE: 209 MG/DL (ref 70–110)
POCT GLUCOSE: 50 MG/DL (ref 70–110)
POCT GLUCOSE: 89 MG/DL (ref 70–110)
POTASSIUM SERPL-SCNC: 5.3 MMOL/L (ref 3.5–5.1)
PROT SERPL-MCNC: 5.6 G/DL (ref 6–8.4)
RBC # BLD AUTO: 2.78 M/UL (ref 4–5.4)
SODIUM SERPL-SCNC: 129 MMOL/L (ref 136–145)
WBC # BLD AUTO: 6.61 K/UL (ref 3.9–12.7)

## 2021-07-06 PROCEDURE — 36415 COLL VENOUS BLD VENIPUNCTURE: CPT | Performed by: HOSPITALIST

## 2021-07-06 PROCEDURE — 97535 SELF CARE MNGMENT TRAINING: CPT

## 2021-07-06 PROCEDURE — 11000001 HC ACUTE MED/SURG PRIVATE ROOM

## 2021-07-06 PROCEDURE — 83735 ASSAY OF MAGNESIUM: CPT | Performed by: HOSPITALIST

## 2021-07-06 PROCEDURE — 99233 SBSQ HOSP IP/OBS HIGH 50: CPT | Mod: ,,, | Performed by: STUDENT IN AN ORGANIZED HEALTH CARE EDUCATION/TRAINING PROGRAM

## 2021-07-06 PROCEDURE — 80053 COMPREHEN METABOLIC PANEL: CPT | Performed by: HOSPITALIST

## 2021-07-06 PROCEDURE — 85025 COMPLETE CBC W/AUTO DIFF WBC: CPT | Performed by: HOSPITALIST

## 2021-07-06 PROCEDURE — 99233 PR SUBSEQUENT HOSPITAL CARE,LEVL III: ICD-10-PCS | Mod: ,,, | Performed by: STUDENT IN AN ORGANIZED HEALTH CARE EDUCATION/TRAINING PROGRAM

## 2021-07-06 PROCEDURE — 84100 ASSAY OF PHOSPHORUS: CPT | Performed by: HOSPITALIST

## 2021-07-06 PROCEDURE — 63600175 PHARM REV CODE 636 W HCPCS: Performed by: HOSPITALIST

## 2021-07-06 PROCEDURE — 25000003 PHARM REV CODE 250: Performed by: HOSPITALIST

## 2021-07-06 PROCEDURE — 25000003 PHARM REV CODE 250: Performed by: STUDENT IN AN ORGANIZED HEALTH CARE EDUCATION/TRAINING PROGRAM

## 2021-07-06 RX ORDER — SERTRALINE HYDROCHLORIDE 25 MG/1
25 TABLET, FILM COATED ORAL DAILY
Qty: 30 TABLET | Refills: 11 | Status: ON HOLD
Start: 2021-07-06 | End: 2021-12-29 | Stop reason: HOSPADM

## 2021-07-06 RX ORDER — INSULIN LISPRO 100 [IU]/ML
10 INJECTION, SOLUTION INTRAVENOUS; SUBCUTANEOUS
Qty: 9 ML | Refills: 11 | Status: ON HOLD
Start: 2021-07-06 | End: 2021-12-29 | Stop reason: HOSPADM

## 2021-07-06 RX ORDER — SERTRALINE HYDROCHLORIDE 25 MG/1
25 TABLET, FILM COATED ORAL DAILY
Status: DISCONTINUED | OUTPATIENT
Start: 2021-07-06 | End: 2021-07-07 | Stop reason: HOSPADM

## 2021-07-06 RX ORDER — ISOSORBIDE MONONITRATE 60 MG/1
60 TABLET, EXTENDED RELEASE ORAL DAILY
Status: DISCONTINUED | OUTPATIENT
Start: 2021-07-06 | End: 2021-07-06

## 2021-07-06 RX ORDER — POLYETHYLENE GLYCOL 3350 17 G/17G
17 POWDER, FOR SOLUTION ORAL
Status: DISCONTINUED | OUTPATIENT
Start: 2021-07-06 | End: 2021-07-07 | Stop reason: HOSPADM

## 2021-07-06 RX ORDER — ISOSORBIDE MONONITRATE 60 MG/1
120 TABLET, EXTENDED RELEASE ORAL DAILY
Status: DISCONTINUED | OUTPATIENT
Start: 2021-07-07 | End: 2021-07-07 | Stop reason: HOSPADM

## 2021-07-06 RX ORDER — CLONIDINE HYDROCHLORIDE 0.2 MG/1
0.2 TABLET ORAL 3 TIMES DAILY
Qty: 90 TABLET | Refills: 11
Start: 2021-07-06 | End: 2022-07-21

## 2021-07-06 RX ORDER — GEMFIBROZIL 600 MG/1
600 TABLET, FILM COATED ORAL
Qty: 36 TABLET | Refills: 3
Start: 2021-07-07 | End: 2023-01-31 | Stop reason: SDUPTHER

## 2021-07-06 RX ORDER — ISOSORBIDE MONONITRATE 60 MG/1
60 TABLET, EXTENDED RELEASE ORAL DAILY
Qty: 30 TABLET | Refills: 11
Start: 2021-07-06 | End: 2021-07-07 | Stop reason: HOSPADM

## 2021-07-06 RX ORDER — AMLODIPINE BESYLATE 10 MG/1
10 TABLET ORAL DAILY
Qty: 30 TABLET | Refills: 11 | Status: ON HOLD
Start: 2021-07-06 | End: 2021-12-29 | Stop reason: HOSPADM

## 2021-07-06 RX ORDER — ISOSORBIDE MONONITRATE 30 MG/1
30 TABLET, EXTENDED RELEASE ORAL DAILY
Status: DISCONTINUED | OUTPATIENT
Start: 2021-07-06 | End: 2021-07-06

## 2021-07-06 RX ADMIN — CLONIDINE HYDROCHLORIDE 0.2 MG: 0.2 TABLET ORAL at 09:07

## 2021-07-06 RX ADMIN — METHOCARBAMOL 1500 MG: 750 TABLET ORAL at 05:07

## 2021-07-06 RX ADMIN — METHOCARBAMOL 1500 MG: 750 TABLET ORAL at 08:07

## 2021-07-06 RX ADMIN — INSULIN ASPART 4 UNITS: 100 INJECTION, SOLUTION INTRAVENOUS; SUBCUTANEOUS at 12:07

## 2021-07-06 RX ADMIN — LACTOBACILLUS ACIDOPHILUS / LACTOBACILLUS BULGARICUS 1 EACH: 100 MILLION CFU STRENGTH GRANULES at 10:07

## 2021-07-06 RX ADMIN — HEPARIN SODIUM 7500 UNITS: 5000 INJECTION INTRAVENOUS; SUBCUTANEOUS at 09:07

## 2021-07-06 RX ADMIN — INSULIN DETEMIR 10 UNITS: 100 INJECTION, SOLUTION SUBCUTANEOUS at 10:07

## 2021-07-06 RX ADMIN — SODIUM BICARBONATE 1300 MG: 650 TABLET ORAL at 09:07

## 2021-07-06 RX ADMIN — SUMATRIPTAN SUCCINATE 50 MG: 50 TABLET ORAL at 12:07

## 2021-07-06 RX ADMIN — CLONIDINE HYDROCHLORIDE 0.2 MG: 0.2 TABLET ORAL at 08:07

## 2021-07-06 RX ADMIN — SODIUM ZIRCONIUM CYCLOSILICATE 10 G: 10 POWDER, FOR SUSPENSION ORAL at 08:07

## 2021-07-06 RX ADMIN — Medication 400 MG: at 09:07

## 2021-07-06 RX ADMIN — INSULIN ASPART 10 UNITS: 100 INJECTION, SOLUTION INTRAVENOUS; SUBCUTANEOUS at 12:07

## 2021-07-06 RX ADMIN — LACTOBACILLUS ACIDOPHILUS / LACTOBACILLUS BULGARICUS 1 EACH: 100 MILLION CFU STRENGTH GRANULES at 08:07

## 2021-07-06 RX ADMIN — DOCUSATE SODIUM 50 MG AND SENNOSIDES 8.6 MG 1 TABLET: 8.6; 5 TABLET, FILM COATED ORAL at 09:07

## 2021-07-06 RX ADMIN — CLONIDINE HYDROCHLORIDE 0.2 MG: 0.2 TABLET ORAL at 03:07

## 2021-07-06 RX ADMIN — METHOCARBAMOL 1500 MG: 750 TABLET ORAL at 07:07

## 2021-07-06 RX ADMIN — POLYETHYLENE GLYCOL 3350 17 G: 17 POWDER, FOR SOLUTION ORAL at 07:07

## 2021-07-06 RX ADMIN — ISOSORBIDE MONONITRATE 60 MG: 60 TABLET, EXTENDED RELEASE ORAL at 08:07

## 2021-07-06 RX ADMIN — ATORVASTATIN CALCIUM 80 MG: 40 TABLET, FILM COATED ORAL at 08:07

## 2021-07-06 RX ADMIN — LEVOTHYROXINE SODIUM 50 MCG: 50 TABLET ORAL at 05:07

## 2021-07-06 RX ADMIN — BUTALBITAL, ACETAMINOPHEN, AND CAFFEINE 1 TABLET: 50; 325; 40 TABLET ORAL at 07:07

## 2021-07-06 RX ADMIN — INSULIN DETEMIR 10 UNITS: 100 INJECTION, SOLUTION SUBCUTANEOUS at 08:07

## 2021-07-06 RX ADMIN — NIFEDIPINE 120 MG: 30 TABLET, FILM COATED, EXTENDED RELEASE ORAL at 09:07

## 2021-07-06 RX ADMIN — HEPARIN SODIUM 7500 UNITS: 5000 INJECTION INTRAVENOUS; SUBCUTANEOUS at 05:07

## 2021-07-06 RX ADMIN — ERGOCALCIFEROL 50000 UNITS: 1.25 CAPSULE ORAL at 08:07

## 2021-07-06 RX ADMIN — SERTRALINE HYDROCHLORIDE 25 MG: 25 TABLET ORAL at 08:07

## 2021-07-06 RX ADMIN — FERROUS SULFATE TAB EC 325 MG (65 MG FE EQUIVALENT) 325 MG: 325 (65 FE) TABLET DELAYED RESPONSE at 09:07

## 2021-07-06 RX ADMIN — TRAZODONE HYDROCHLORIDE 100 MG: 100 TABLET ORAL at 09:07

## 2021-07-06 RX ADMIN — OXYBUTYNIN CHLORIDE 10 MG: 10 TABLET, EXTENDED RELEASE ORAL at 08:07

## 2021-07-06 RX ADMIN — INSULIN ASPART 10 UNITS: 100 INJECTION, SOLUTION INTRAVENOUS; SUBCUTANEOUS at 08:07

## 2021-07-06 RX ADMIN — MICONAZOLE NITRATE: 20 POWDER TOPICAL at 10:07

## 2021-07-06 RX ADMIN — HYDRALAZINE HYDROCHLORIDE 25 MG: 25 TABLET, FILM COATED ORAL at 05:07

## 2021-07-06 RX ADMIN — PROPRANOLOL HYDROCHLORIDE 80 MG: 80 CAPSULE, EXTENDED RELEASE ORAL at 08:07

## 2021-07-06 RX ADMIN — SODIUM BICARBONATE 1300 MG: 650 TABLET ORAL at 03:07

## 2021-07-06 RX ADMIN — DOCUSATE SODIUM 50 MG AND SENNOSIDES 8.6 MG 1 TABLET: 8.6; 5 TABLET, FILM COATED ORAL at 08:07

## 2021-07-06 RX ADMIN — ONDANSETRON 4 MG: 2 INJECTION INTRAMUSCULAR; INTRAVENOUS at 03:07

## 2021-07-06 RX ADMIN — ANASTROZOLE 1 MG: 1 TABLET, COATED ORAL at 08:07

## 2021-07-06 RX ADMIN — SUMATRIPTAN SUCCINATE 50 MG: 50 TABLET ORAL at 04:07

## 2021-07-06 RX ADMIN — HYDROCODONE BITARTRATE AND ACETAMINOPHEN 1 TABLET: 10; 325 TABLET ORAL at 02:07

## 2021-07-06 RX ADMIN — Medication 400 MG: at 08:07

## 2021-07-06 RX ADMIN — HYDROCODONE BITARTRATE AND ACETAMINOPHEN 1 TABLET: 10; 325 TABLET ORAL at 08:07

## 2021-07-06 RX ADMIN — HEPARIN SODIUM 7500 UNITS: 5000 INJECTION INTRAVENOUS; SUBCUTANEOUS at 03:07

## 2021-07-06 RX ADMIN — MICONAZOLE NITRATE: 20 POWDER TOPICAL at 08:07

## 2021-07-07 ENCOUNTER — DOCUMENT SCAN (OUTPATIENT)
Dept: HOME HEALTH SERVICES | Facility: HOSPITAL | Age: 71
End: 2021-07-07
Payer: COMMERCIAL

## 2021-07-07 ENCOUNTER — TELEPHONE (OUTPATIENT)
Dept: UROLOGY | Facility: CLINIC | Age: 71
End: 2021-07-07

## 2021-07-07 VITALS
HEIGHT: 68 IN | RESPIRATION RATE: 16 BRPM | SYSTOLIC BLOOD PRESSURE: 144 MMHG | WEIGHT: 267.19 LBS | DIASTOLIC BLOOD PRESSURE: 65 MMHG | HEART RATE: 74 BPM | TEMPERATURE: 98 F | OXYGEN SATURATION: 94 % | BODY MASS INDEX: 40.5 KG/M2

## 2021-07-07 DIAGNOSIS — N20.1 URETERAL STONE: ICD-10-CM

## 2021-07-07 DIAGNOSIS — N31.9 NEUROGENIC BLADDER: Primary | ICD-10-CM

## 2021-07-07 PROBLEM — R11.2 NAUSEA AND VOMITING: Status: ACTIVE | Noted: 2021-07-07

## 2021-07-07 LAB
ALBUMIN SERPL BCP-MCNC: 2.1 G/DL (ref 3.5–5.2)
ALP SERPL-CCNC: 120 U/L (ref 55–135)
ALT SERPL W/O P-5'-P-CCNC: 9 U/L (ref 10–44)
ANION GAP SERPL CALC-SCNC: 7 MMOL/L (ref 8–16)
AST SERPL-CCNC: 9 U/L (ref 10–40)
BACTERIA BLD CULT: NORMAL
BACTERIA BLD CULT: NORMAL
BASOPHILS # BLD AUTO: 0.04 K/UL (ref 0–0.2)
BASOPHILS NFR BLD: 0.6 % (ref 0–1.9)
BILIRUB SERPL-MCNC: 0.1 MG/DL (ref 0.1–1)
BUN SERPL-MCNC: 28 MG/DL (ref 8–23)
CALCIUM SERPL-MCNC: 8.5 MG/DL (ref 8.7–10.5)
CHLORIDE SERPL-SCNC: 101 MMOL/L (ref 95–110)
CO2 SERPL-SCNC: 24 MMOL/L (ref 23–29)
CREAT SERPL-MCNC: 2.1 MG/DL (ref 0.5–1.4)
DIFFERENTIAL METHOD: ABNORMAL
EOSINOPHIL # BLD AUTO: 0.3 K/UL (ref 0–0.5)
EOSINOPHIL NFR BLD: 4.7 % (ref 0–8)
ERYTHROCYTE [DISTWIDTH] IN BLOOD BY AUTOMATED COUNT: 14.8 % (ref 11.5–14.5)
EST. GFR  (AFRICAN AMERICAN): 27.3 ML/MIN/1.73 M^2
EST. GFR  (NON AFRICAN AMERICAN): 23.7 ML/MIN/1.73 M^2
GLUCOSE SERPL-MCNC: 136 MG/DL (ref 70–110)
HCT VFR BLD AUTO: 23.1 % (ref 37–48.5)
HGB BLD-MCNC: 7 G/DL (ref 12–16)
IMM GRANULOCYTES # BLD AUTO: 0.06 K/UL (ref 0–0.04)
IMM GRANULOCYTES NFR BLD AUTO: 0.9 % (ref 0–0.5)
LYMPHOCYTES # BLD AUTO: 1.6 K/UL (ref 1–4.8)
LYMPHOCYTES NFR BLD: 25 % (ref 18–48)
MAGNESIUM SERPL-MCNC: 1.9 MG/DL (ref 1.6–2.6)
MCH RBC QN AUTO: 27.8 PG (ref 27–31)
MCHC RBC AUTO-ENTMCNC: 30.3 G/DL (ref 32–36)
MCV RBC AUTO: 92 FL (ref 82–98)
MONOCYTES # BLD AUTO: 0.8 K/UL (ref 0.3–1)
MONOCYTES NFR BLD: 11.9 % (ref 4–15)
NEUTROPHILS # BLD AUTO: 3.7 K/UL (ref 1.8–7.7)
NEUTROPHILS NFR BLD: 56.9 % (ref 38–73)
NRBC BLD-RTO: 0 /100 WBC
PHOSPHATE SERPL-MCNC: 4.1 MG/DL (ref 2.7–4.5)
PLATELET # BLD AUTO: 324 K/UL (ref 150–450)
PMV BLD AUTO: 9.7 FL (ref 9.2–12.9)
POCT GLUCOSE: 141 MG/DL (ref 70–110)
POCT GLUCOSE: 156 MG/DL (ref 70–110)
POCT GLUCOSE: 99 MG/DL (ref 70–110)
POTASSIUM SERPL-SCNC: 5.3 MMOL/L (ref 3.5–5.1)
PROT SERPL-MCNC: 5.3 G/DL (ref 6–8.4)
RBC # BLD AUTO: 2.52 M/UL (ref 4–5.4)
SODIUM SERPL-SCNC: 132 MMOL/L (ref 136–145)
WBC # BLD AUTO: 6.57 K/UL (ref 3.9–12.7)

## 2021-07-07 PROCEDURE — 97530 THERAPEUTIC ACTIVITIES: CPT

## 2021-07-07 PROCEDURE — 36415 COLL VENOUS BLD VENIPUNCTURE: CPT | Performed by: HOSPITALIST

## 2021-07-07 PROCEDURE — 84100 ASSAY OF PHOSPHORUS: CPT | Performed by: HOSPITALIST

## 2021-07-07 PROCEDURE — 63600175 PHARM REV CODE 636 W HCPCS: Performed by: HOSPITALIST

## 2021-07-07 PROCEDURE — 99239 PR HOSPITAL DISCHARGE DAY,>30 MIN: ICD-10-PCS | Mod: ,,, | Performed by: STUDENT IN AN ORGANIZED HEALTH CARE EDUCATION/TRAINING PROGRAM

## 2021-07-07 PROCEDURE — 99239 HOSP IP/OBS DSCHRG MGMT >30: CPT | Mod: ,,, | Performed by: STUDENT IN AN ORGANIZED HEALTH CARE EDUCATION/TRAINING PROGRAM

## 2021-07-07 PROCEDURE — 25000003 PHARM REV CODE 250: Performed by: HOSPITALIST

## 2021-07-07 PROCEDURE — 80053 COMPREHEN METABOLIC PANEL: CPT | Performed by: HOSPITALIST

## 2021-07-07 PROCEDURE — 97116 GAIT TRAINING THERAPY: CPT

## 2021-07-07 PROCEDURE — 85025 COMPLETE CBC W/AUTO DIFF WBC: CPT | Performed by: HOSPITALIST

## 2021-07-07 PROCEDURE — 83735 ASSAY OF MAGNESIUM: CPT | Performed by: HOSPITALIST

## 2021-07-07 PROCEDURE — 25000003 PHARM REV CODE 250: Performed by: STUDENT IN AN ORGANIZED HEALTH CARE EDUCATION/TRAINING PROGRAM

## 2021-07-07 RX ORDER — ISOSORBIDE MONONITRATE 120 MG/1
120 TABLET, EXTENDED RELEASE ORAL DAILY
Qty: 30 TABLET | Refills: 11
Start: 2021-07-07 | End: 2021-08-27 | Stop reason: SDUPTHER

## 2021-07-07 RX ORDER — PSEUDOEPHEDRINE/ACETAMINOPHEN 30MG-500MG
100 TABLET ORAL
Status: COMPLETED | OUTPATIENT
Start: 2021-07-07 | End: 2021-07-07

## 2021-07-07 RX ORDER — LACTULOSE 10 G/15ML
20 SOLUTION ORAL ONCE
Status: COMPLETED | OUTPATIENT
Start: 2021-07-07 | End: 2021-07-07

## 2021-07-07 RX ORDER — SYRING-NEEDL,DISP,INSUL,0.3 ML 29 G X1/2"
296 SYRINGE, EMPTY DISPOSABLE MISCELLANEOUS
Status: COMPLETED | OUTPATIENT
Start: 2021-07-07 | End: 2021-07-07

## 2021-07-07 RX ADMIN — MICONAZOLE NITRATE: 20 POWDER TOPICAL at 10:07

## 2021-07-07 RX ADMIN — INSULIN DETEMIR 10 UNITS: 100 INJECTION, SOLUTION SUBCUTANEOUS at 10:07

## 2021-07-07 RX ADMIN — INSULIN ASPART 10 UNITS: 100 INJECTION, SOLUTION INTRAVENOUS; SUBCUTANEOUS at 08:07

## 2021-07-07 RX ADMIN — POLYETHYLENE GLYCOL 3350 17 G: 17 POWDER, FOR SOLUTION ORAL at 02:07

## 2021-07-07 RX ADMIN — ISOSORBIDE MONONITRATE 120 MG: 60 TABLET, EXTENDED RELEASE ORAL at 10:07

## 2021-07-07 RX ADMIN — MAGNESIUM CITRATE 296 ML: 1.75 LIQUID ORAL at 03:07

## 2021-07-07 RX ADMIN — LEVOTHYROXINE SODIUM 50 MCG: 50 TABLET ORAL at 06:07

## 2021-07-07 RX ADMIN — LACTOBACILLUS ACIDOPHILUS / LACTOBACILLUS BULGARICUS 1 EACH: 100 MILLION CFU STRENGTH GRANULES at 10:07

## 2021-07-07 RX ADMIN — INSULIN ASPART 2 UNITS: 100 INJECTION, SOLUTION INTRAVENOUS; SUBCUTANEOUS at 08:07

## 2021-07-07 RX ADMIN — Medication 400 MG: at 10:07

## 2021-07-07 RX ADMIN — POLYETHYLENE GLYCOL 3350 17 G: 17 POWDER, FOR SOLUTION ORAL at 10:07

## 2021-07-07 RX ADMIN — SODIUM CHLORIDE 500 ML: 0.9 INJECTION, SOLUTION INTRAVENOUS at 02:07

## 2021-07-07 RX ADMIN — OXYBUTYNIN CHLORIDE 10 MG: 10 TABLET, EXTENDED RELEASE ORAL at 10:07

## 2021-07-07 RX ADMIN — SODIUM ZIRCONIUM CYCLOSILICATE 10 G: 10 POWDER, FOR SUSPENSION ORAL at 12:07

## 2021-07-07 RX ADMIN — HEPARIN SODIUM 7500 UNITS: 5000 INJECTION INTRAVENOUS; SUBCUTANEOUS at 06:07

## 2021-07-07 RX ADMIN — ATORVASTATIN CALCIUM 80 MG: 40 TABLET, FILM COATED ORAL at 10:07

## 2021-07-07 RX ADMIN — HYDROCODONE BITARTRATE AND ACETAMINOPHEN 1 TABLET: 10; 325 TABLET ORAL at 12:07

## 2021-07-07 RX ADMIN — DOCUSATE SODIUM 50 MG AND SENNOSIDES 8.6 MG 1 TABLET: 8.6; 5 TABLET, FILM COATED ORAL at 10:07

## 2021-07-07 RX ADMIN — ONDANSETRON 4 MG: 2 INJECTION INTRAMUSCULAR; INTRAVENOUS at 04:07

## 2021-07-07 RX ADMIN — METHOCARBAMOL 1500 MG: 750 TABLET ORAL at 10:07

## 2021-07-07 RX ADMIN — SUMATRIPTAN SUCCINATE 50 MG: 50 TABLET ORAL at 03:07

## 2021-07-07 RX ADMIN — HYDROCODONE BITARTRATE AND ACETAMINOPHEN 1 TABLET: 10; 325 TABLET ORAL at 03:07

## 2021-07-07 RX ADMIN — SODIUM BICARBONATE 1300 MG: 650 TABLET ORAL at 10:07

## 2021-07-07 RX ADMIN — ANASTROZOLE 1 MG: 1 TABLET, COATED ORAL at 10:07

## 2021-07-07 RX ADMIN — INSULIN ASPART 10 UNITS: 100 INJECTION, SOLUTION INTRAVENOUS; SUBCUTANEOUS at 11:07

## 2021-07-07 RX ADMIN — LACTULOSE 20 G: 10 SOLUTION ORAL at 10:07

## 2021-07-07 RX ADMIN — Medication 100 ML: at 03:07

## 2021-07-07 RX ADMIN — HEPARIN SODIUM 7500 UNITS: 5000 INJECTION INTRAVENOUS; SUBCUTANEOUS at 02:07

## 2021-07-07 RX ADMIN — CLONIDINE HYDROCHLORIDE 0.2 MG: 0.2 TABLET ORAL at 10:07

## 2021-07-07 RX ADMIN — PROPRANOLOL HYDROCHLORIDE 80 MG: 80 CAPSULE, EXTENDED RELEASE ORAL at 10:07

## 2021-07-07 RX ADMIN — SERTRALINE HYDROCHLORIDE 25 MG: 25 TABLET ORAL at 10:07

## 2021-07-10 ENCOUNTER — DOCUMENT SCAN (OUTPATIENT)
Dept: HOME HEALTH SERVICES | Facility: HOSPITAL | Age: 71
End: 2021-07-10
Payer: COMMERCIAL

## 2021-07-12 ENCOUNTER — HOSPITAL ENCOUNTER (OUTPATIENT)
Dept: RADIOLOGY | Facility: HOSPITAL | Age: 71
Discharge: HOME | End: 2021-07-12
Attending: PHYSICAL MEDICINE & REHABILITATION
Payer: MEDICARE

## 2021-07-12 ENCOUNTER — TELEPHONE (OUTPATIENT)
Dept: INTERNAL MEDICINE | Facility: CLINIC | Age: 71
End: 2021-07-12

## 2021-07-12 LAB
ALDOST SERPL-MCNC: 12.8 NG/DL
ALDOST/RENIN PLAS-RTO: 32 RATIO
RENIN PLAS-CCNC: 0.4 NG/ML/HR

## 2021-07-12 PROCEDURE — 73221 MRI JOINT UPR EXTREM W/O DYE: CPT | Mod: 26,RT,, | Performed by: RADIOLOGY

## 2021-07-12 PROCEDURE — 73221 MRI SHOULDER WITHOUT CONTRAST RIGHT: ICD-10-PCS | Mod: 26,RT,, | Performed by: RADIOLOGY

## 2021-07-15 ENCOUNTER — OUTPATIENT CASE MANAGEMENT (OUTPATIENT)
Dept: ADMINISTRATIVE | Facility: OTHER | Age: 71
End: 2021-07-15

## 2021-07-21 ENCOUNTER — PES CALL (OUTPATIENT)
Dept: ADMINISTRATIVE | Facility: CLINIC | Age: 71
End: 2021-07-21

## 2021-07-21 ENCOUNTER — TELEPHONE (OUTPATIENT)
Dept: INTERNAL MEDICINE | Facility: CLINIC | Age: 71
End: 2021-07-21

## 2021-07-23 ENCOUNTER — LAB VISIT (OUTPATIENT)
Dept: LAB | Facility: HOSPITAL | Age: 71
End: 2021-07-23
Attending: UROLOGY
Payer: MEDICARE

## 2021-07-23 ENCOUNTER — TELEPHONE (OUTPATIENT)
Dept: UROLOGY | Facility: CLINIC | Age: 71
End: 2021-07-23

## 2021-07-23 DIAGNOSIS — N31.9 NEUROGENIC DYSFUNCTION OF THE URINARY BLADDER: Primary | ICD-10-CM

## 2021-07-23 PROCEDURE — 87086 URINE CULTURE/COLONY COUNT: CPT | Performed by: UROLOGY

## 2021-07-25 PROBLEM — N20.0 NEPHROLITHIASIS: Status: ACTIVE | Noted: 2021-07-25

## 2021-07-25 LAB — BACTERIA UR CULT: NO GROWTH

## 2021-07-27 ENCOUNTER — TELEPHONE (OUTPATIENT)
Dept: INTERNAL MEDICINE | Facility: CLINIC | Age: 71
End: 2021-07-27

## 2021-07-27 ENCOUNTER — NURSE TRIAGE (OUTPATIENT)
Dept: ADMINISTRATIVE | Facility: CLINIC | Age: 71
End: 2021-07-27

## 2021-07-28 ENCOUNTER — PATIENT OUTREACH (OUTPATIENT)
Dept: ADMINISTRATIVE | Facility: OTHER | Age: 71
End: 2021-07-28

## 2021-07-29 ENCOUNTER — TELEPHONE (OUTPATIENT)
Dept: NEPHROLOGY | Facility: CLINIC | Age: 71
End: 2021-07-29

## 2021-07-30 ENCOUNTER — PATIENT MESSAGE (OUTPATIENT)
Dept: ADMINISTRATIVE | Facility: HOSPITAL | Age: 71
End: 2021-07-30

## 2021-07-30 ENCOUNTER — TELEPHONE (OUTPATIENT)
Dept: INTERNAL MEDICINE | Facility: CLINIC | Age: 71
End: 2021-07-30

## 2021-07-30 ENCOUNTER — PATIENT OUTREACH (OUTPATIENT)
Dept: ADMINISTRATIVE | Facility: HOSPITAL | Age: 71
End: 2021-07-30

## 2021-07-30 ENCOUNTER — OFFICE VISIT (OUTPATIENT)
Dept: PHYSICAL MEDICINE AND REHAB | Facility: CLINIC | Age: 71
End: 2021-07-30
Payer: MEDICARE

## 2021-07-30 DIAGNOSIS — M54.2 CHRONIC NECK PAIN: ICD-10-CM

## 2021-07-30 DIAGNOSIS — Z79.891 CHRONIC USE OF OPIATE FOR THERAPEUTIC PURPOSE: ICD-10-CM

## 2021-07-30 DIAGNOSIS — M79.7 FIBROMYALGIA: ICD-10-CM

## 2021-07-30 DIAGNOSIS — M75.101 RIGHT ROTATOR CUFF TEAR ARTHROPATHY: ICD-10-CM

## 2021-07-30 DIAGNOSIS — G56.03 BILATERAL CARPAL TUNNEL SYNDROME: ICD-10-CM

## 2021-07-30 DIAGNOSIS — G89.29 CHRONIC MIDLINE LOW BACK PAIN WITHOUT SCIATICA: Primary | ICD-10-CM

## 2021-07-30 DIAGNOSIS — Z12.31 ENCOUNTER FOR SCREENING MAMMOGRAM FOR BREAST CANCER: Primary | ICD-10-CM

## 2021-07-30 DIAGNOSIS — G89.29 CHRONIC NECK PAIN: ICD-10-CM

## 2021-07-30 DIAGNOSIS — G89.29 CHRONIC MIDLINE THORACIC BACK PAIN: ICD-10-CM

## 2021-07-30 DIAGNOSIS — M12.811 RIGHT ROTATOR CUFF TEAR ARTHROPATHY: ICD-10-CM

## 2021-07-30 DIAGNOSIS — M47.812 SPONDYLOSIS OF CERVICAL REGION WITHOUT MYELOPATHY OR RADICULOPATHY: ICD-10-CM

## 2021-07-30 DIAGNOSIS — M94.0 COSTOCHONDRITIS: ICD-10-CM

## 2021-07-30 DIAGNOSIS — E66.01 MORBID OBESITY WITH BMI OF 40.0-44.9, ADULT: ICD-10-CM

## 2021-07-30 DIAGNOSIS — M54.50 CHRONIC MIDLINE LOW BACK PAIN WITHOUT SCIATICA: Primary | ICD-10-CM

## 2021-07-30 DIAGNOSIS — M47.816 SPONDYLOSIS OF LUMBAR REGION WITHOUT MYELOPATHY OR RADICULOPATHY: ICD-10-CM

## 2021-07-30 DIAGNOSIS — M54.6 CHRONIC MIDLINE THORACIC BACK PAIN: ICD-10-CM

## 2021-07-30 PROCEDURE — 99443 PR PHYSICIAN TELEPHONE EVALUATION 21-30 MIN: CPT | Mod: 95,,, | Performed by: PHYSICAL MEDICINE & REHABILITATION

## 2021-07-30 PROCEDURE — 99443 PR PHYSICIAN TELEPHONE EVALUATION 21-30 MIN: ICD-10-PCS | Mod: 95,,, | Performed by: PHYSICAL MEDICINE & REHABILITATION

## 2021-08-02 ENCOUNTER — TELEPHONE (OUTPATIENT)
Dept: INTERNAL MEDICINE | Facility: CLINIC | Age: 71
End: 2021-08-02

## 2021-08-03 ENCOUNTER — TELEPHONE (OUTPATIENT)
Dept: HEMATOLOGY/ONCOLOGY | Facility: CLINIC | Age: 71
End: 2021-08-03

## 2021-08-04 PROCEDURE — G0179 MD RECERTIFICATION HHA PT: HCPCS | Mod: ,,, | Performed by: INTERNAL MEDICINE

## 2021-08-04 PROCEDURE — G0179 PR HOME HEALTH MD RECERTIFICATION: ICD-10-PCS | Mod: ,,, | Performed by: INTERNAL MEDICINE

## 2021-08-05 ENCOUNTER — DOCUMENT SCAN (OUTPATIENT)
Dept: HOME HEALTH SERVICES | Facility: HOSPITAL | Age: 71
End: 2021-08-05
Payer: COMMERCIAL

## 2021-08-05 ENCOUNTER — OFFICE VISIT (OUTPATIENT)
Dept: NEPHROLOGY | Facility: CLINIC | Age: 71
End: 2021-08-05
Payer: MEDICARE

## 2021-08-05 DIAGNOSIS — N18.4 CKD (CHRONIC KIDNEY DISEASE) STAGE 4, GFR 15-29 ML/MIN: Primary | ICD-10-CM

## 2021-08-05 DIAGNOSIS — Z79.4 TYPE 2 DIABETES MELLITUS WITH DIABETIC NEUROPATHY, WITH LONG-TERM CURRENT USE OF INSULIN: ICD-10-CM

## 2021-08-05 DIAGNOSIS — E66.9 DIABETES MELLITUS TYPE 2 IN OBESE: ICD-10-CM

## 2021-08-05 DIAGNOSIS — N25.81 SECONDARY HYPERPARATHYROIDISM, RENAL: ICD-10-CM

## 2021-08-05 DIAGNOSIS — E78.2 MIXED HYPERLIPIDEMIA: ICD-10-CM

## 2021-08-05 DIAGNOSIS — E55.9 VITAMIN D DEFICIENCY DISEASE: ICD-10-CM

## 2021-08-05 DIAGNOSIS — I15.2 HYPERTENSION ASSOCIATED WITH DIABETES: ICD-10-CM

## 2021-08-05 DIAGNOSIS — G47.33 OSA (OBSTRUCTIVE SLEEP APNEA): ICD-10-CM

## 2021-08-05 DIAGNOSIS — E66.01 MORBID OBESITY DUE TO EXCESS CALORIES: ICD-10-CM

## 2021-08-05 DIAGNOSIS — Z90.12 S/P LEFT MASTECTOMY: ICD-10-CM

## 2021-08-05 DIAGNOSIS — N31.9 NEUROGENIC BLADDER: ICD-10-CM

## 2021-08-05 DIAGNOSIS — E11.69 HYPERLIPIDEMIA ASSOCIATED WITH TYPE 2 DIABETES MELLITUS: ICD-10-CM

## 2021-08-05 DIAGNOSIS — E11.59 HYPERTENSION ASSOCIATED WITH DIABETES: ICD-10-CM

## 2021-08-05 DIAGNOSIS — E11.40 TYPE 2 DIABETES MELLITUS WITH DIABETIC NEUROPATHY, WITH LONG-TERM CURRENT USE OF INSULIN: ICD-10-CM

## 2021-08-05 DIAGNOSIS — E03.9 HYPOTHYROIDISM, UNSPECIFIED TYPE: ICD-10-CM

## 2021-08-05 DIAGNOSIS — Z93.6 PRESENCE OF UROSTOMY: ICD-10-CM

## 2021-08-05 DIAGNOSIS — G47.00 INSOMNIA, UNSPECIFIED TYPE: ICD-10-CM

## 2021-08-05 DIAGNOSIS — G43.909 MIXED MIGRAINE AND MUSCLE CONTRACTION HEADACHE: ICD-10-CM

## 2021-08-05 DIAGNOSIS — M79.7 FIBROMYALGIA: ICD-10-CM

## 2021-08-05 DIAGNOSIS — G44.209 MIXED MIGRAINE AND MUSCLE CONTRACTION HEADACHE: ICD-10-CM

## 2021-08-05 DIAGNOSIS — I10 ESSENTIAL HYPERTENSION: ICD-10-CM

## 2021-08-05 DIAGNOSIS — E11.69 DIABETES MELLITUS TYPE 2 IN OBESE: ICD-10-CM

## 2021-08-05 DIAGNOSIS — R80.9 TYPE 2 DIABETES MELLITUS WITH PROTEINURIA: ICD-10-CM

## 2021-08-05 DIAGNOSIS — M85.80 OSTEOPENIA, UNSPECIFIED LOCATION: ICD-10-CM

## 2021-08-05 DIAGNOSIS — E87.20 METABOLIC ACIDOSIS: ICD-10-CM

## 2021-08-05 DIAGNOSIS — E11.29 TYPE 2 DIABETES MELLITUS WITH PROTEINURIA: ICD-10-CM

## 2021-08-05 DIAGNOSIS — E78.5 HYPERLIPIDEMIA ASSOCIATED WITH TYPE 2 DIABETES MELLITUS: ICD-10-CM

## 2021-08-05 DIAGNOSIS — Z93.59 CHRONIC SUPRAPUBIC CATHETER: ICD-10-CM

## 2021-08-05 DIAGNOSIS — E03.9 ACQUIRED HYPOTHYROIDISM: ICD-10-CM

## 2021-08-05 DIAGNOSIS — D50.8 OTHER IRON DEFICIENCY ANEMIA: ICD-10-CM

## 2021-08-05 PROCEDURE — 99443 PR PHYSICIAN TELEPHONE EVALUATION 21-30 MIN: ICD-10-PCS | Mod: 95,,, | Performed by: INTERNAL MEDICINE

## 2021-08-05 PROCEDURE — 99443 PR PHYSICIAN TELEPHONE EVALUATION 21-30 MIN: CPT | Mod: 95,,, | Performed by: INTERNAL MEDICINE

## 2021-08-05 RX ORDER — POLYETHYLENE GLYCOL 3350 17 G/17G
POWDER, FOR SOLUTION ORAL
Qty: 30 PACKET | Refills: 6 | Status: SHIPPED | OUTPATIENT
Start: 2021-08-05 | End: 2023-01-30

## 2021-08-08 ENCOUNTER — PATIENT MESSAGE (OUTPATIENT)
Dept: PHYSICAL MEDICINE AND REHAB | Facility: CLINIC | Age: 71
End: 2021-08-08

## 2021-08-08 DIAGNOSIS — M54.50 CHRONIC MIDLINE LOW BACK PAIN WITHOUT SCIATICA: ICD-10-CM

## 2021-08-08 DIAGNOSIS — M54.2 CHRONIC NECK PAIN: ICD-10-CM

## 2021-08-08 DIAGNOSIS — G89.29 CHRONIC MIDLINE LOW BACK PAIN WITHOUT SCIATICA: ICD-10-CM

## 2021-08-08 DIAGNOSIS — M79.7 FIBROMYALGIA: ICD-10-CM

## 2021-08-08 DIAGNOSIS — G89.29 CHRONIC NECK PAIN: ICD-10-CM

## 2021-08-09 RX ORDER — METHOCARBAMOL 750 MG/1
750-1500 TABLET, FILM COATED ORAL 3 TIMES DAILY PRN
Qty: 180 TABLET | Refills: 1 | Status: SHIPPED | OUTPATIENT
Start: 2021-08-09 | End: 2021-10-21

## 2021-08-11 ENCOUNTER — PATIENT MESSAGE (OUTPATIENT)
Dept: NEPHROLOGY | Facility: CLINIC | Age: 71
End: 2021-08-11

## 2021-08-11 ENCOUNTER — PATIENT OUTREACH (OUTPATIENT)
Dept: HOME HEALTH SERVICES | Facility: HOSPITAL | Age: 71
End: 2021-08-11

## 2021-08-12 ENCOUNTER — TELEPHONE (OUTPATIENT)
Dept: INTERNAL MEDICINE | Facility: CLINIC | Age: 71
End: 2021-08-12

## 2021-08-12 ENCOUNTER — EXTERNAL HOME HEALTH (OUTPATIENT)
Dept: HOME HEALTH SERVICES | Facility: HOSPITAL | Age: 71
End: 2021-08-12
Payer: MEDICARE

## 2021-08-20 ENCOUNTER — PATIENT MESSAGE (OUTPATIENT)
Dept: PHYSICAL MEDICINE AND REHAB | Facility: CLINIC | Age: 71
End: 2021-08-20

## 2021-08-20 DIAGNOSIS — M54.2 CHRONIC NECK PAIN: ICD-10-CM

## 2021-08-20 DIAGNOSIS — M79.7 FIBROMYALGIA: ICD-10-CM

## 2021-08-20 DIAGNOSIS — M54.50 CHRONIC MIDLINE LOW BACK PAIN WITHOUT SCIATICA: ICD-10-CM

## 2021-08-20 DIAGNOSIS — G89.29 CHRONIC NECK PAIN: ICD-10-CM

## 2021-08-20 DIAGNOSIS — G89.29 CHRONIC MIDLINE LOW BACK PAIN WITHOUT SCIATICA: ICD-10-CM

## 2021-08-20 RX ORDER — HYDROCODONE BITARTRATE AND ACETAMINOPHEN 10; 325 MG/1; MG/1
1 TABLET ORAL EVERY 6 HOURS PRN
Qty: 120 TABLET | Refills: 0 | Status: ON HOLD | OUTPATIENT
Start: 2021-08-21 | End: 2021-09-07 | Stop reason: HOSPADM

## 2021-08-27 ENCOUNTER — TELEPHONE (OUTPATIENT)
Dept: INTERNAL MEDICINE | Facility: CLINIC | Age: 71
End: 2021-08-27

## 2021-08-27 RX ORDER — ISOSORBIDE MONONITRATE 120 MG/1
120 TABLET, EXTENDED RELEASE ORAL DAILY
Qty: 90 TABLET | Refills: 0 | Status: ON HOLD | OUTPATIENT
Start: 2021-08-27 | End: 2021-12-29 | Stop reason: HOSPADM

## 2021-08-27 RX ORDER — TRAZODONE HYDROCHLORIDE 100 MG/1
100 TABLET ORAL NIGHTLY PRN
Qty: 90 TABLET | Refills: 0 | Status: SHIPPED | OUTPATIENT
Start: 2021-08-27 | End: 2022-07-21

## 2021-09-05 ENCOUNTER — HOSPITAL ENCOUNTER (INPATIENT)
Facility: HOSPITAL | Age: 71
LOS: 2 days | Discharge: NURSING, REHAB OR LONG-TERM CARE | DRG: 699 | End: 2021-09-07
Attending: EMERGENCY MEDICINE | Admitting: HOSPITALIST
Payer: MEDICARE

## 2021-09-05 DIAGNOSIS — R07.9 CHEST PAIN: ICD-10-CM

## 2021-09-05 DIAGNOSIS — G43.711 INTRACTABLE CHRONIC MIGRAINE WITHOUT AURA AND WITH STATUS MIGRAINOSUS: ICD-10-CM

## 2021-09-05 DIAGNOSIS — R11.0 NAUSEA: ICD-10-CM

## 2021-09-05 PROBLEM — T83.511A URINARY TRACT INFECTION ASSOCIATED WITH INDWELLING URETHRAL CATHETER: Status: ACTIVE | Noted: 2021-09-05

## 2021-09-05 PROBLEM — N39.0 URINARY TRACT INFECTION ASSOCIATED WITH INDWELLING URETHRAL CATHETER: Status: ACTIVE | Noted: 2021-09-05

## 2021-09-05 PROBLEM — R74.01 TRANSAMINITIS: Status: ACTIVE | Noted: 2021-09-05

## 2021-09-05 LAB
ALBUMIN SERPL BCP-MCNC: 2.9 G/DL (ref 3.5–5.2)
ALP SERPL-CCNC: 308 U/L (ref 55–135)
ALT SERPL W/O P-5'-P-CCNC: 321 U/L (ref 10–44)
ANION GAP SERPL CALC-SCNC: 11 MMOL/L (ref 8–16)
ANION GAP SERPL CALC-SCNC: 12 MMOL/L (ref 8–16)
AST SERPL-CCNC: 337 U/L (ref 10–40)
BACTERIA #/AREA URNS AUTO: ABNORMAL /HPF
BASOPHILS # BLD AUTO: 0.04 K/UL (ref 0–0.2)
BASOPHILS NFR BLD: 0.4 % (ref 0–1.9)
BILIRUB SERPL-MCNC: 0.3 MG/DL (ref 0.1–1)
BILIRUB UR QL STRIP: NEGATIVE
BUN SERPL-MCNC: 41 MG/DL (ref 8–23)
BUN SERPL-MCNC: 43 MG/DL (ref 8–23)
BUN SERPL-MCNC: 44 MG/DL (ref 6–30)
CALCIUM SERPL-MCNC: 8.4 MG/DL (ref 8.7–10.5)
CALCIUM SERPL-MCNC: 9.5 MG/DL (ref 8.7–10.5)
CHLORIDE SERPL-SCNC: 101 MMOL/L (ref 95–110)
CHLORIDE SERPL-SCNC: 103 MMOL/L (ref 95–110)
CHLORIDE SERPL-SCNC: 108 MMOL/L (ref 95–110)
CHLORIDE UR-SCNC: 30 MMOL/L (ref 25–200)
CLARITY UR REFRACT.AUTO: ABNORMAL
CO2 SERPL-SCNC: 11 MMOL/L (ref 23–29)
CO2 SERPL-SCNC: 14 MMOL/L (ref 23–29)
COLOR UR AUTO: YELLOW
CREAT SERPL-MCNC: 2.3 MG/DL (ref 0.5–1.4)
CREAT SERPL-MCNC: 2.8 MG/DL (ref 0.5–1.4)
CREAT SERPL-MCNC: 2.9 MG/DL (ref 0.5–1.4)
CREAT UR-MCNC: 23 MG/DL (ref 15–325)
CTP QC/QA: YES
DIFFERENTIAL METHOD: ABNORMAL
EOSINOPHIL # BLD AUTO: 0.4 K/UL (ref 0–0.5)
EOSINOPHIL NFR BLD: 3.3 % (ref 0–8)
ERYTHROCYTE [DISTWIDTH] IN BLOOD BY AUTOMATED COUNT: 13.3 % (ref 11.5–14.5)
EST. GFR  (AFRICAN AMERICAN): 19.1 ML/MIN/1.73 M^2
EST. GFR  (AFRICAN AMERICAN): 24.3 ML/MIN/1.73 M^2
EST. GFR  (NON AFRICAN AMERICAN): 16.6 ML/MIN/1.73 M^2
EST. GFR  (NON AFRICAN AMERICAN): 21.1 ML/MIN/1.73 M^2
ESTIMATED AVG GLUCOSE: 143 MG/DL (ref 68–131)
GLUCOSE SERPL-MCNC: 184 MG/DL (ref 70–110)
GLUCOSE SERPL-MCNC: 200 MG/DL (ref 70–110)
GLUCOSE SERPL-MCNC: 200 MG/DL (ref 70–110)
GLUCOSE SERPL-MCNC: 244 MG/DL (ref 70–110)
GLUCOSE SERPL-MCNC: 246 MG/DL (ref 70–110)
GLUCOSE UR QL STRIP: ABNORMAL
HBA1C MFR BLD: 6.6 % (ref 4–5.6)
HCT VFR BLD AUTO: 38.1 % (ref 37–48.5)
HCT VFR BLD CALC: 36 %PCV (ref 36–54)
HGB BLD-MCNC: 12.3 G/DL (ref 12–16)
HGB UR QL STRIP: ABNORMAL
HYALINE CASTS UR QL AUTO: 0 /LPF
IMM GRANULOCYTES # BLD AUTO: 0.06 K/UL (ref 0–0.04)
IMM GRANULOCYTES NFR BLD AUTO: 0.6 % (ref 0–0.5)
INR PPP: 1.1 (ref 0.8–1.2)
KETONES UR QL STRIP: NEGATIVE
LACTATE SERPL-SCNC: 1 MMOL/L (ref 0.5–2.2)
LEUKOCYTE ESTERASE UR QL STRIP: ABNORMAL
LIPASE SERPL-CCNC: 11 U/L (ref 4–60)
LYMPHOCYTES # BLD AUTO: 1.3 K/UL (ref 1–4.8)
LYMPHOCYTES NFR BLD: 11.7 % (ref 18–48)
MCH RBC QN AUTO: 28.1 PG (ref 27–31)
MCHC RBC AUTO-ENTMCNC: 32.3 G/DL (ref 32–36)
MCV RBC AUTO: 87 FL (ref 82–98)
MICROSCOPIC COMMENT: ABNORMAL
MONOCYTES # BLD AUTO: 0.6 K/UL (ref 0.3–1)
MONOCYTES NFR BLD: 5.4 % (ref 4–15)
NEUTROPHILS # BLD AUTO: 8.4 K/UL (ref 1.8–7.7)
NEUTROPHILS NFR BLD: 78.6 % (ref 38–73)
NITRITE UR QL STRIP: POSITIVE
NRBC BLD-RTO: 0 /100 WBC
OSMOLALITY SERPL: 303 MOSM/KG (ref 275–295)
OSMOLALITY UR: 190 MOSM/KG (ref 50–1200)
PH UR STRIP: 7 [PH] (ref 5–8)
PLATELET # BLD AUTO: 327 K/UL (ref 150–450)
PMV BLD AUTO: 10.1 FL (ref 9.2–12.9)
POC IONIZED CALCIUM: 1.33 MMOL/L (ref 1.06–1.42)
POC TCO2 (MEASURED): 16 MMOL/L (ref 23–29)
POCT GLUCOSE: 200 MG/DL (ref 70–110)
POTASSIUM BLD-SCNC: 4.6 MMOL/L (ref 3.5–5.1)
POTASSIUM SERPL-SCNC: 4 MMOL/L (ref 3.5–5.1)
POTASSIUM SERPL-SCNC: 4.5 MMOL/L (ref 3.5–5.1)
PROT SERPL-MCNC: 7.2 G/DL (ref 6–8.4)
PROT UR QL STRIP: ABNORMAL
PROTHROMBIN TIME: 12.1 SEC (ref 9–12.5)
RBC # BLD AUTO: 4.38 M/UL (ref 4–5.4)
RBC #/AREA URNS AUTO: 10 /HPF (ref 0–4)
SAMPLE: ABNORMAL
SARS-COV-2 RDRP RESP QL NAA+PROBE: NEGATIVE
SARS-COV-2 RNA RESP QL NAA+PROBE: NOT DETECTED
SODIUM BLD-SCNC: 130 MMOL/L (ref 136–145)
SODIUM SERPL-SCNC: 127 MMOL/L (ref 136–145)
SODIUM SERPL-SCNC: 130 MMOL/L (ref 136–145)
SODIUM UR-SCNC: 27 MMOL/L (ref 20–250)
SP GR UR STRIP: 1.01 (ref 1–1.03)
TSH SERPL DL<=0.005 MIU/L-ACNC: 0.51 UIU/ML (ref 0.4–4)
URN SPEC COLLECT METH UR: ABNORMAL
WBC # BLD AUTO: 10.69 K/UL (ref 3.9–12.7)
WBC #/AREA URNS AUTO: >100 /HPF (ref 0–5)
WBC CLUMPS UR QL AUTO: ABNORMAL

## 2021-09-05 PROCEDURE — 87088 URINE BACTERIA CULTURE: CPT | Performed by: STUDENT IN AN ORGANIZED HEALTH CARE EDUCATION/TRAINING PROGRAM

## 2021-09-05 PROCEDURE — 83690 ASSAY OF LIPASE: CPT | Performed by: STUDENT IN AN ORGANIZED HEALTH CARE EDUCATION/TRAINING PROGRAM

## 2021-09-05 PROCEDURE — 81001 URINALYSIS AUTO W/SCOPE: CPT | Performed by: STUDENT IN AN ORGANIZED HEALTH CARE EDUCATION/TRAINING PROGRAM

## 2021-09-05 PROCEDURE — 99285 EMERGENCY DEPT VISIT HI MDM: CPT | Mod: CS,,, | Performed by: EMERGENCY MEDICINE

## 2021-09-05 PROCEDURE — 93010 ELECTROCARDIOGRAM REPORT: CPT | Mod: ,,, | Performed by: INTERNAL MEDICINE

## 2021-09-05 PROCEDURE — 87186 SC STD MICRODIL/AGAR DIL: CPT | Performed by: STUDENT IN AN ORGANIZED HEALTH CARE EDUCATION/TRAINING PROGRAM

## 2021-09-05 PROCEDURE — 80048 BASIC METABOLIC PNL TOTAL CA: CPT | Performed by: STUDENT IN AN ORGANIZED HEALTH CARE EDUCATION/TRAINING PROGRAM

## 2021-09-05 PROCEDURE — 83935 ASSAY OF URINE OSMOLALITY: CPT | Performed by: STUDENT IN AN ORGANIZED HEALTH CARE EDUCATION/TRAINING PROGRAM

## 2021-09-05 PROCEDURE — 82550 ASSAY OF CK (CPK): CPT | Performed by: STUDENT IN AN ORGANIZED HEALTH CARE EDUCATION/TRAINING PROGRAM

## 2021-09-05 PROCEDURE — 87086 URINE CULTURE/COLONY COUNT: CPT | Performed by: STUDENT IN AN ORGANIZED HEALTH CARE EDUCATION/TRAINING PROGRAM

## 2021-09-05 PROCEDURE — 87184 SC STD DISK METHOD PER PLATE: CPT | Performed by: STUDENT IN AN ORGANIZED HEALTH CARE EDUCATION/TRAINING PROGRAM

## 2021-09-05 PROCEDURE — U0003 INFECTIOUS AGENT DETECTION BY NUCLEIC ACID (DNA OR RNA); SEVERE ACUTE RESPIRATORY SYNDROME CORONAVIRUS 2 (SARS-COV-2) (CORONAVIRUS DISEASE [COVID-19]), AMPLIFIED PROBE TECHNIQUE, MAKING USE OF HIGH THROUGHPUT TECHNOLOGIES AS DESCRIBED BY CMS-2020-01-R: HCPCS | Performed by: STUDENT IN AN ORGANIZED HEALTH CARE EDUCATION/TRAINING PROGRAM

## 2021-09-05 PROCEDURE — U0002 COVID-19 LAB TEST NON-CDC: HCPCS | Performed by: HOSPITALIST

## 2021-09-05 PROCEDURE — 25000003 PHARM REV CODE 250: Performed by: STUDENT IN AN ORGANIZED HEALTH CARE EDUCATION/TRAINING PROGRAM

## 2021-09-05 PROCEDURE — 83605 ASSAY OF LACTIC ACID: CPT | Performed by: EMERGENCY MEDICINE

## 2021-09-05 PROCEDURE — 87389 HIV-1 AG W/HIV-1&-2 AB AG IA: CPT | Performed by: STUDENT IN AN ORGANIZED HEALTH CARE EDUCATION/TRAINING PROGRAM

## 2021-09-05 PROCEDURE — 87077 CULTURE AEROBIC IDENTIFY: CPT | Performed by: STUDENT IN AN ORGANIZED HEALTH CARE EDUCATION/TRAINING PROGRAM

## 2021-09-05 PROCEDURE — 25000003 PHARM REV CODE 250: Performed by: EMERGENCY MEDICINE

## 2021-09-05 PROCEDURE — 80047 BASIC METABLC PNL IONIZED CA: CPT

## 2021-09-05 PROCEDURE — 84300 ASSAY OF URINE SODIUM: CPT | Performed by: STUDENT IN AN ORGANIZED HEALTH CARE EDUCATION/TRAINING PROGRAM

## 2021-09-05 PROCEDURE — 83930 ASSAY OF BLOOD OSMOLALITY: CPT | Performed by: STUDENT IN AN ORGANIZED HEALTH CARE EDUCATION/TRAINING PROGRAM

## 2021-09-05 PROCEDURE — 93010 EKG 12-LEAD: ICD-10-PCS | Mod: ,,, | Performed by: INTERNAL MEDICINE

## 2021-09-05 PROCEDURE — 85025 COMPLETE CBC W/AUTO DIFF WBC: CPT | Performed by: EMERGENCY MEDICINE

## 2021-09-05 PROCEDURE — 99285 EMERGENCY DEPT VISIT HI MDM: CPT | Mod: 25

## 2021-09-05 PROCEDURE — 63600175 PHARM REV CODE 636 W HCPCS: Performed by: EMERGENCY MEDICINE

## 2021-09-05 PROCEDURE — 84443 ASSAY THYROID STIM HORMONE: CPT | Performed by: STUDENT IN AN ORGANIZED HEALTH CARE EDUCATION/TRAINING PROGRAM

## 2021-09-05 PROCEDURE — 80074 ACUTE HEPATITIS PANEL: CPT | Performed by: STUDENT IN AN ORGANIZED HEALTH CARE EDUCATION/TRAINING PROGRAM

## 2021-09-05 PROCEDURE — 99285 PR EMERGENCY DEPT VISIT,LEVEL V: ICD-10-PCS | Mod: CS,,, | Performed by: EMERGENCY MEDICINE

## 2021-09-05 PROCEDURE — 63600175 PHARM REV CODE 636 W HCPCS: Performed by: STUDENT IN AN ORGANIZED HEALTH CARE EDUCATION/TRAINING PROGRAM

## 2021-09-05 PROCEDURE — 12000002 HC ACUTE/MED SURGE SEMI-PRIVATE ROOM

## 2021-09-05 PROCEDURE — 82570 ASSAY OF URINE CREATININE: CPT | Performed by: STUDENT IN AN ORGANIZED HEALTH CARE EDUCATION/TRAINING PROGRAM

## 2021-09-05 PROCEDURE — 83036 HEMOGLOBIN GLYCOSYLATED A1C: CPT | Performed by: STUDENT IN AN ORGANIZED HEALTH CARE EDUCATION/TRAINING PROGRAM

## 2021-09-05 PROCEDURE — 93005 ELECTROCARDIOGRAM TRACING: CPT

## 2021-09-05 PROCEDURE — 85610 PROTHROMBIN TIME: CPT | Performed by: STUDENT IN AN ORGANIZED HEALTH CARE EDUCATION/TRAINING PROGRAM

## 2021-09-05 PROCEDURE — U0005 INFEC AGEN DETEC AMPLI PROBE: HCPCS | Performed by: STUDENT IN AN ORGANIZED HEALTH CARE EDUCATION/TRAINING PROGRAM

## 2021-09-05 PROCEDURE — 80053 COMPREHEN METABOLIC PANEL: CPT | Performed by: EMERGENCY MEDICINE

## 2021-09-05 PROCEDURE — 96360 HYDRATION IV INFUSION INIT: CPT

## 2021-09-05 PROCEDURE — 82436 ASSAY OF URINE CHLORIDE: CPT | Performed by: STUDENT IN AN ORGANIZED HEALTH CARE EDUCATION/TRAINING PROGRAM

## 2021-09-05 RX ORDER — SODIUM CHLORIDE 0.9 % (FLUSH) 0.9 %
10 SYRINGE (ML) INJECTION
Status: DISCONTINUED | OUTPATIENT
Start: 2021-09-05 | End: 2021-09-08 | Stop reason: HOSPADM

## 2021-09-05 RX ORDER — ATORVASTATIN CALCIUM 40 MG/1
40 TABLET, FILM COATED ORAL NIGHTLY
Status: DISCONTINUED | OUTPATIENT
Start: 2021-09-05 | End: 2021-09-05

## 2021-09-05 RX ORDER — LANOLIN ALCOHOL/MO/W.PET/CERES
1 CREAM (GRAM) TOPICAL DAILY
Status: DISCONTINUED | OUTPATIENT
Start: 2021-09-06 | End: 2021-09-08 | Stop reason: HOSPADM

## 2021-09-05 RX ORDER — ONDANSETRON 2 MG/ML
4 INJECTION INTRAMUSCULAR; INTRAVENOUS EVERY 6 HOURS PRN
Status: DISCONTINUED | OUTPATIENT
Start: 2021-09-05 | End: 2021-09-08 | Stop reason: HOSPADM

## 2021-09-05 RX ORDER — CEFTRIAXONE 1 G/1
1 INJECTION, POWDER, FOR SOLUTION INTRAMUSCULAR; INTRAVENOUS
Status: DISCONTINUED | OUTPATIENT
Start: 2021-09-05 | End: 2021-09-08 | Stop reason: HOSPADM

## 2021-09-05 RX ORDER — OXYBUTYNIN CHLORIDE 10 MG/1
10 TABLET, EXTENDED RELEASE ORAL DAILY
Status: DISCONTINUED | OUTPATIENT
Start: 2021-09-06 | End: 2021-09-08 | Stop reason: HOSPADM

## 2021-09-05 RX ORDER — SODIUM BICARBONATE 650 MG/1
1300 TABLET ORAL 3 TIMES DAILY
Status: DISCONTINUED | OUTPATIENT
Start: 2021-09-05 | End: 2021-09-08 | Stop reason: HOSPADM

## 2021-09-05 RX ORDER — OXYCODONE HYDROCHLORIDE 5 MG/1
5 TABLET ORAL ONCE AS NEEDED
Status: COMPLETED | OUTPATIENT
Start: 2021-09-05 | End: 2021-09-05

## 2021-09-05 RX ORDER — IBUPROFEN 200 MG
16 TABLET ORAL
Status: DISCONTINUED | OUTPATIENT
Start: 2021-09-05 | End: 2021-09-07

## 2021-09-05 RX ORDER — HYDROCODONE BITARTRATE AND ACETAMINOPHEN 10; 325 MG/1; MG/1
1 TABLET ORAL EVERY 8 HOURS PRN
Status: DISCONTINUED | OUTPATIENT
Start: 2021-09-05 | End: 2021-09-05

## 2021-09-05 RX ORDER — HEPARIN SODIUM 5000 [USP'U]/ML
5000 INJECTION, SOLUTION INTRAVENOUS; SUBCUTANEOUS EVERY 8 HOURS
Status: DISCONTINUED | OUTPATIENT
Start: 2021-09-05 | End: 2021-09-08 | Stop reason: HOSPADM

## 2021-09-05 RX ORDER — ERGOCALCIFEROL 1.25 MG/1
50000 CAPSULE ORAL
Status: DISCONTINUED | OUTPATIENT
Start: 2021-09-06 | End: 2021-09-08 | Stop reason: HOSPADM

## 2021-09-05 RX ORDER — ONDANSETRON 2 MG/ML
4 INJECTION INTRAMUSCULAR; INTRAVENOUS ONCE
Status: COMPLETED | OUTPATIENT
Start: 2021-09-05 | End: 2021-09-05

## 2021-09-05 RX ORDER — LEVOTHYROXINE SODIUM 50 UG/1
50 TABLET ORAL
Status: DISCONTINUED | OUTPATIENT
Start: 2021-09-06 | End: 2021-09-08 | Stop reason: HOSPADM

## 2021-09-05 RX ORDER — PROCHLORPERAZINE EDISYLATE 5 MG/ML
10 INJECTION INTRAMUSCULAR; INTRAVENOUS ONCE
Status: COMPLETED | OUTPATIENT
Start: 2021-09-05 | End: 2021-09-05

## 2021-09-05 RX ORDER — TRAZODONE HYDROCHLORIDE 100 MG/1
100 TABLET ORAL NIGHTLY PRN
Status: DISCONTINUED | OUTPATIENT
Start: 2021-09-05 | End: 2021-09-08 | Stop reason: HOSPADM

## 2021-09-05 RX ORDER — INSULIN ASPART 100 [IU]/ML
0-5 INJECTION, SOLUTION INTRAVENOUS; SUBCUTANEOUS EVERY 6 HOURS PRN
Status: DISCONTINUED | OUTPATIENT
Start: 2021-09-05 | End: 2021-09-06

## 2021-09-05 RX ORDER — SERTRALINE HYDROCHLORIDE 25 MG/1
25 TABLET, FILM COATED ORAL DAILY
Status: DISCONTINUED | OUTPATIENT
Start: 2021-09-06 | End: 2021-09-08 | Stop reason: HOSPADM

## 2021-09-05 RX ORDER — GLUCAGON 1 MG
1 KIT INJECTION
Status: DISCONTINUED | OUTPATIENT
Start: 2021-09-05 | End: 2021-09-07

## 2021-09-05 RX ORDER — IBUPROFEN 200 MG
24 TABLET ORAL
Status: DISCONTINUED | OUTPATIENT
Start: 2021-09-05 | End: 2021-09-07

## 2021-09-05 RX ADMIN — SODIUM BICARBONATE 1300 MG: 650 TABLET ORAL at 10:09

## 2021-09-05 RX ADMIN — SODIUM CHLORIDE 1000 ML: 0.9 INJECTION, SOLUTION INTRAVENOUS at 04:09

## 2021-09-05 RX ADMIN — ONDANSETRON 4 MG: 2 INJECTION INTRAMUSCULAR; INTRAVENOUS at 09:09

## 2021-09-05 RX ADMIN — PROCHLORPERAZINE EDISYLATE 10 MG: 5 INJECTION INTRAMUSCULAR; INTRAVENOUS at 10:09

## 2021-09-05 RX ADMIN — HEPARIN SODIUM 5000 UNITS: 5000 INJECTION INTRAVENOUS; SUBCUTANEOUS at 10:09

## 2021-09-05 RX ADMIN — OXYCODONE 5 MG: 5 TABLET ORAL at 10:09

## 2021-09-06 LAB
ALBUMIN SERPL BCP-MCNC: 2.3 G/DL (ref 3.5–5.2)
ALP SERPL-CCNC: 222 U/L (ref 55–135)
ALT SERPL W/O P-5'-P-CCNC: 187 U/L (ref 10–44)
ANION GAP SERPL CALC-SCNC: 9 MMOL/L (ref 8–16)
APAP SERPL-MCNC: <3 UG/ML (ref 10–20)
AST SERPL-CCNC: 117 U/L (ref 10–40)
BASOPHILS # BLD AUTO: 0.04 K/UL (ref 0–0.2)
BASOPHILS NFR BLD: 0.4 % (ref 0–1.9)
BILIRUB SERPL-MCNC: 0.2 MG/DL (ref 0.1–1)
BUN SERPL-MCNC: 43 MG/DL (ref 8–23)
CALCIUM SERPL-MCNC: 8.6 MG/DL (ref 8.7–10.5)
CHLORIDE SERPL-SCNC: 108 MMOL/L (ref 95–110)
CK SERPL-CCNC: 43 U/L (ref 20–180)
CO2 SERPL-SCNC: 15 MMOL/L (ref 23–29)
CREAT SERPL-MCNC: 2.6 MG/DL (ref 0.5–1.4)
DIFFERENTIAL METHOD: ABNORMAL
EOSINOPHIL # BLD AUTO: 0.5 K/UL (ref 0–0.5)
EOSINOPHIL NFR BLD: 5.2 % (ref 0–8)
ERYTHROCYTE [DISTWIDTH] IN BLOOD BY AUTOMATED COUNT: 13.7 % (ref 11.5–14.5)
EST. GFR  (AFRICAN AMERICAN): 20.9 ML/MIN/1.73 M^2
EST. GFR  (NON AFRICAN AMERICAN): 18.2 ML/MIN/1.73 M^2
GLUCOSE SERPL-MCNC: 198 MG/DL (ref 70–110)
HCT VFR BLD AUTO: 28.9 % (ref 37–48.5)
HGB BLD-MCNC: 9.3 G/DL (ref 12–16)
IMM GRANULOCYTES # BLD AUTO: 0.04 K/UL (ref 0–0.04)
IMM GRANULOCYTES NFR BLD AUTO: 0.4 % (ref 0–0.5)
LYMPHOCYTES # BLD AUTO: 1.9 K/UL (ref 1–4.8)
LYMPHOCYTES NFR BLD: 20.2 % (ref 18–48)
MAGNESIUM SERPL-MCNC: 1.7 MG/DL (ref 1.6–2.6)
MCH RBC QN AUTO: 28.3 PG (ref 27–31)
MCHC RBC AUTO-ENTMCNC: 32.2 G/DL (ref 32–36)
MCV RBC AUTO: 88 FL (ref 82–98)
MONOCYTES # BLD AUTO: 0.9 K/UL (ref 0.3–1)
MONOCYTES NFR BLD: 10.2 % (ref 4–15)
NEUTROPHILS # BLD AUTO: 5.8 K/UL (ref 1.8–7.7)
NEUTROPHILS NFR BLD: 63.6 % (ref 38–73)
NRBC BLD-RTO: 0 /100 WBC
PHOSPHATE SERPL-MCNC: 3.5 MG/DL (ref 2.7–4.5)
PLATELET # BLD AUTO: 251 K/UL (ref 150–450)
PMV BLD AUTO: 10.3 FL (ref 9.2–12.9)
POCT GLUCOSE: 184 MG/DL (ref 70–110)
POCT GLUCOSE: 239 MG/DL (ref 70–110)
POCT GLUCOSE: 249 MG/DL (ref 70–110)
POCT GLUCOSE: 283 MG/DL (ref 70–110)
POTASSIUM SERPL-SCNC: 4.4 MMOL/L (ref 3.5–5.1)
PROT SERPL-MCNC: 5.4 G/DL (ref 6–8.4)
RBC # BLD AUTO: 3.29 M/UL (ref 4–5.4)
SODIUM SERPL-SCNC: 132 MMOL/L (ref 136–145)
WBC # BLD AUTO: 9.19 K/UL (ref 3.9–12.7)

## 2021-09-06 PROCEDURE — 84100 ASSAY OF PHOSPHORUS: CPT | Performed by: STUDENT IN AN ORGANIZED HEALTH CARE EDUCATION/TRAINING PROGRAM

## 2021-09-06 PROCEDURE — 99223 1ST HOSP IP/OBS HIGH 75: CPT | Mod: AI,GC,, | Performed by: HOSPITALIST

## 2021-09-06 PROCEDURE — 97166 OT EVAL MOD COMPLEX 45 MIN: CPT

## 2021-09-06 PROCEDURE — 85025 COMPLETE CBC W/AUTO DIFF WBC: CPT | Performed by: STUDENT IN AN ORGANIZED HEALTH CARE EDUCATION/TRAINING PROGRAM

## 2021-09-06 PROCEDURE — C9399 UNCLASSIFIED DRUGS OR BIOLOG: HCPCS | Performed by: HOSPITALIST

## 2021-09-06 PROCEDURE — 94660 CPAP INITIATION&MGMT: CPT

## 2021-09-06 PROCEDURE — 99223 PR INITIAL HOSPITAL CARE,LEVL III: ICD-10-PCS | Mod: AI,GC,, | Performed by: HOSPITALIST

## 2021-09-06 PROCEDURE — 83735 ASSAY OF MAGNESIUM: CPT | Performed by: STUDENT IN AN ORGANIZED HEALTH CARE EDUCATION/TRAINING PROGRAM

## 2021-09-06 PROCEDURE — 25000003 PHARM REV CODE 250: Performed by: HOSPITALIST

## 2021-09-06 PROCEDURE — 99900035 HC TECH TIME PER 15 MIN (STAT)

## 2021-09-06 PROCEDURE — 63600175 PHARM REV CODE 636 W HCPCS: Performed by: HOSPITALIST

## 2021-09-06 PROCEDURE — 63600175 PHARM REV CODE 636 W HCPCS: Performed by: STUDENT IN AN ORGANIZED HEALTH CARE EDUCATION/TRAINING PROGRAM

## 2021-09-06 PROCEDURE — 80143 DRUG ASSAY ACETAMINOPHEN: CPT | Performed by: STUDENT IN AN ORGANIZED HEALTH CARE EDUCATION/TRAINING PROGRAM

## 2021-09-06 PROCEDURE — C9399 UNCLASSIFIED DRUGS OR BIOLOG: HCPCS | Performed by: STUDENT IN AN ORGANIZED HEALTH CARE EDUCATION/TRAINING PROGRAM

## 2021-09-06 PROCEDURE — 36415 COLL VENOUS BLD VENIPUNCTURE: CPT | Performed by: STUDENT IN AN ORGANIZED HEALTH CARE EDUCATION/TRAINING PROGRAM

## 2021-09-06 PROCEDURE — 97535 SELF CARE MNGMENT TRAINING: CPT

## 2021-09-06 PROCEDURE — 11000001 HC ACUTE MED/SURG PRIVATE ROOM

## 2021-09-06 PROCEDURE — 25000003 PHARM REV CODE 250: Performed by: STUDENT IN AN ORGANIZED HEALTH CARE EDUCATION/TRAINING PROGRAM

## 2021-09-06 PROCEDURE — 97162 PT EVAL MOD COMPLEX 30 MIN: CPT

## 2021-09-06 PROCEDURE — 80053 COMPREHEN METABOLIC PANEL: CPT | Performed by: STUDENT IN AN ORGANIZED HEALTH CARE EDUCATION/TRAINING PROGRAM

## 2021-09-06 RX ORDER — INSULIN ASPART 100 [IU]/ML
5 INJECTION, SOLUTION INTRAVENOUS; SUBCUTANEOUS
Status: DISCONTINUED | OUTPATIENT
Start: 2021-09-06 | End: 2021-09-07

## 2021-09-06 RX ORDER — IBUPROFEN 200 MG
24 TABLET ORAL
Status: DISCONTINUED | OUTPATIENT
Start: 2021-09-06 | End: 2021-09-08 | Stop reason: HOSPADM

## 2021-09-06 RX ORDER — GLUCAGON 1 MG
1 KIT INJECTION
Status: DISCONTINUED | OUTPATIENT
Start: 2021-09-06 | End: 2021-09-08 | Stop reason: HOSPADM

## 2021-09-06 RX ORDER — INSULIN ASPART 100 [IU]/ML
1-10 INJECTION, SOLUTION INTRAVENOUS; SUBCUTANEOUS
Status: DISCONTINUED | OUTPATIENT
Start: 2021-09-06 | End: 2021-09-08 | Stop reason: HOSPADM

## 2021-09-06 RX ORDER — SUMATRIPTAN 50 MG/1
50 TABLET, FILM COATED ORAL 2 TIMES DAILY PRN
Status: DISCONTINUED | OUTPATIENT
Start: 2021-09-06 | End: 2021-09-08 | Stop reason: HOSPADM

## 2021-09-06 RX ORDER — CLONIDINE HYDROCHLORIDE 0.3 MG/1
0.3 TABLET ORAL 3 TIMES DAILY
Status: DISCONTINUED | OUTPATIENT
Start: 2021-09-06 | End: 2021-09-07

## 2021-09-06 RX ORDER — PROCHLORPERAZINE EDISYLATE 5 MG/ML
10 INJECTION INTRAMUSCULAR; INTRAVENOUS EVERY 6 HOURS PRN
Status: DISCONTINUED | OUTPATIENT
Start: 2021-09-06 | End: 2021-09-08 | Stop reason: HOSPADM

## 2021-09-06 RX ORDER — IBUPROFEN 200 MG
16 TABLET ORAL
Status: DISCONTINUED | OUTPATIENT
Start: 2021-09-06 | End: 2021-09-08 | Stop reason: HOSPADM

## 2021-09-06 RX ADMIN — INSULIN DETEMIR 2 UNITS: 100 INJECTION, SOLUTION SUBCUTANEOUS at 08:09

## 2021-09-06 RX ADMIN — CLONIDINE HYDROCHLORIDE 0.3 MG: 0.3 TABLET ORAL at 02:09

## 2021-09-06 RX ADMIN — FERROUS SULFATE TAB EC 325 MG (65 MG FE EQUIVALENT) 1 EACH: 325 (65 FE) TABLET DELAYED RESPONSE at 08:09

## 2021-09-06 RX ADMIN — INSULIN ASPART 5 UNITS: 100 INJECTION, SOLUTION INTRAVENOUS; SUBCUTANEOUS at 12:09

## 2021-09-06 RX ADMIN — SODIUM BICARBONATE 1300 MG: 650 TABLET ORAL at 11:09

## 2021-09-06 RX ADMIN — INSULIN DETEMIR 5 UNITS: 100 INJECTION, SOLUTION SUBCUTANEOUS at 12:09

## 2021-09-06 RX ADMIN — CLONIDINE HYDROCHLORIDE 0.3 MG: 0.3 TABLET ORAL at 11:09

## 2021-09-06 RX ADMIN — LEVOTHYROXINE SODIUM 50 MCG: 50 TABLET ORAL at 05:09

## 2021-09-06 RX ADMIN — HEPARIN SODIUM 5000 UNITS: 5000 INJECTION INTRAVENOUS; SUBCUTANEOUS at 02:09

## 2021-09-06 RX ADMIN — INSULIN ASPART 2 UNITS: 100 INJECTION, SOLUTION INTRAVENOUS; SUBCUTANEOUS at 11:09

## 2021-09-06 RX ADMIN — OXYBUTYNIN CHLORIDE 10 MG: 10 TABLET, EXTENDED RELEASE ORAL at 08:09

## 2021-09-06 RX ADMIN — HEPARIN SODIUM 5000 UNITS: 5000 INJECTION INTRAVENOUS; SUBCUTANEOUS at 11:09

## 2021-09-06 RX ADMIN — SERTRALINE HYDROCHLORIDE 25 MG: 25 TABLET ORAL at 08:09

## 2021-09-06 RX ADMIN — CLONIDINE HYDROCHLORIDE 0.3 MG: 0.3 TABLET ORAL at 08:09

## 2021-09-06 RX ADMIN — HEPARIN SODIUM 5000 UNITS: 5000 INJECTION INTRAVENOUS; SUBCUTANEOUS at 05:09

## 2021-09-06 RX ADMIN — PROCHLORPERAZINE EDISYLATE 10 MG: 5 INJECTION INTRAMUSCULAR; INTRAVENOUS at 02:09

## 2021-09-06 RX ADMIN — ERGOCALCIFEROL 50000 UNITS: 1.25 CAPSULE ORAL at 08:09

## 2021-09-06 RX ADMIN — SODIUM BICARBONATE 1300 MG: 650 TABLET ORAL at 02:09

## 2021-09-06 RX ADMIN — SUMATRIPTAN SUCCINATE 50 MG: 50 TABLET ORAL at 11:09

## 2021-09-06 RX ADMIN — CEFTRIAXONE 1 G: 1 INJECTION, POWDER, FOR SOLUTION INTRAMUSCULAR; INTRAVENOUS at 02:09

## 2021-09-06 RX ADMIN — INSULIN ASPART 6 UNITS: 100 INJECTION, SOLUTION INTRAVENOUS; SUBCUTANEOUS at 12:09

## 2021-09-06 RX ADMIN — SODIUM BICARBONATE 1300 MG: 650 TABLET ORAL at 08:09

## 2021-09-06 RX ADMIN — INSULIN DETEMIR 5 UNITS: 100 INJECTION, SOLUTION SUBCUTANEOUS at 11:09

## 2021-09-07 VITALS
HEART RATE: 66 BPM | OXYGEN SATURATION: 98 % | TEMPERATURE: 97 F | DIASTOLIC BLOOD PRESSURE: 68 MMHG | SYSTOLIC BLOOD PRESSURE: 151 MMHG | HEIGHT: 65 IN | WEIGHT: 271.19 LBS | BODY MASS INDEX: 45.18 KG/M2 | RESPIRATION RATE: 16 BRPM

## 2021-09-07 LAB
ALBUMIN SERPL BCP-MCNC: 2.4 G/DL (ref 3.5–5.2)
ALP SERPL-CCNC: 219 U/L (ref 55–135)
ALT SERPL W/O P-5'-P-CCNC: 126 U/L (ref 10–44)
ANION GAP SERPL CALC-SCNC: 11 MMOL/L (ref 8–16)
AST SERPL-CCNC: 34 U/L (ref 10–40)
BASOPHILS # BLD AUTO: 0.04 K/UL (ref 0–0.2)
BASOPHILS NFR BLD: 0.6 % (ref 0–1.9)
BILIRUB SERPL-MCNC: 0.1 MG/DL (ref 0.1–1)
BUN SERPL-MCNC: 37 MG/DL (ref 8–23)
CALCIUM SERPL-MCNC: 8.8 MG/DL (ref 8.7–10.5)
CHLORIDE SERPL-SCNC: 109 MMOL/L (ref 95–110)
CO2 SERPL-SCNC: 13 MMOL/L (ref 23–29)
CREAT SERPL-MCNC: 2.5 MG/DL (ref 0.5–1.4)
DIFFERENTIAL METHOD: ABNORMAL
EOSINOPHIL # BLD AUTO: 0.4 K/UL (ref 0–0.5)
EOSINOPHIL NFR BLD: 5.6 % (ref 0–8)
ERYTHROCYTE [DISTWIDTH] IN BLOOD BY AUTOMATED COUNT: 13.8 % (ref 11.5–14.5)
EST. GFR  (AFRICAN AMERICAN): 21.9 ML/MIN/1.73 M^2
EST. GFR  (NON AFRICAN AMERICAN): 19 ML/MIN/1.73 M^2
GLUCOSE SERPL-MCNC: 232 MG/DL (ref 70–110)
HAV IGM SERPL QL IA: NEGATIVE
HBV CORE IGM SERPL QL IA: NEGATIVE
HBV SURFACE AG SERPL QL IA: NEGATIVE
HCT VFR BLD AUTO: 31.8 % (ref 37–48.5)
HCV AB SERPL QL IA: NEGATIVE
HGB BLD-MCNC: 10.2 G/DL (ref 12–16)
HIV 1+2 AB+HIV1 P24 AG SERPL QL IA: NEGATIVE
IMM GRANULOCYTES # BLD AUTO: 0.04 K/UL (ref 0–0.04)
IMM GRANULOCYTES NFR BLD AUTO: 0.6 % (ref 0–0.5)
LYMPHOCYTES # BLD AUTO: 1.9 K/UL (ref 1–4.8)
LYMPHOCYTES NFR BLD: 29.3 % (ref 18–48)
MAGNESIUM SERPL-MCNC: 1.8 MG/DL (ref 1.6–2.6)
MCH RBC QN AUTO: 28.7 PG (ref 27–31)
MCHC RBC AUTO-ENTMCNC: 32.1 G/DL (ref 32–36)
MCV RBC AUTO: 90 FL (ref 82–98)
MONOCYTES # BLD AUTO: 0.6 K/UL (ref 0.3–1)
MONOCYTES NFR BLD: 8.5 % (ref 4–15)
NEUTROPHILS # BLD AUTO: 3.6 K/UL (ref 1.8–7.7)
NEUTROPHILS NFR BLD: 55.4 % (ref 38–73)
NRBC BLD-RTO: 0 /100 WBC
PHOSPHATE SERPL-MCNC: 3.7 MG/DL (ref 2.7–4.5)
PLATELET # BLD AUTO: 282 K/UL (ref 150–450)
PMV BLD AUTO: 10.2 FL (ref 9.2–12.9)
POCT GLUCOSE: 223 MG/DL (ref 70–110)
POCT GLUCOSE: 226 MG/DL (ref 70–110)
POCT GLUCOSE: 251 MG/DL (ref 70–110)
POCT GLUCOSE: 274 MG/DL (ref 70–110)
POTASSIUM SERPL-SCNC: 4.8 MMOL/L (ref 3.5–5.1)
PROT SERPL-MCNC: 5.9 G/DL (ref 6–8.4)
RBC # BLD AUTO: 3.55 M/UL (ref 4–5.4)
SODIUM SERPL-SCNC: 133 MMOL/L (ref 136–145)
WBC # BLD AUTO: 6.55 K/UL (ref 3.9–12.7)

## 2021-09-07 PROCEDURE — 94761 N-INVAS EAR/PLS OXIMETRY MLT: CPT

## 2021-09-07 PROCEDURE — 36415 COLL VENOUS BLD VENIPUNCTURE: CPT | Performed by: STUDENT IN AN ORGANIZED HEALTH CARE EDUCATION/TRAINING PROGRAM

## 2021-09-07 PROCEDURE — 84100 ASSAY OF PHOSPHORUS: CPT | Performed by: STUDENT IN AN ORGANIZED HEALTH CARE EDUCATION/TRAINING PROGRAM

## 2021-09-07 PROCEDURE — 99239 HOSP IP/OBS DSCHRG MGMT >30: CPT | Mod: GC,,, | Performed by: HOSPITALIST

## 2021-09-07 PROCEDURE — 63600175 PHARM REV CODE 636 W HCPCS

## 2021-09-07 PROCEDURE — 63600175 PHARM REV CODE 636 W HCPCS: Performed by: STUDENT IN AN ORGANIZED HEALTH CARE EDUCATION/TRAINING PROGRAM

## 2021-09-07 PROCEDURE — 25000003 PHARM REV CODE 250: Performed by: STUDENT IN AN ORGANIZED HEALTH CARE EDUCATION/TRAINING PROGRAM

## 2021-09-07 PROCEDURE — 83735 ASSAY OF MAGNESIUM: CPT | Performed by: STUDENT IN AN ORGANIZED HEALTH CARE EDUCATION/TRAINING PROGRAM

## 2021-09-07 PROCEDURE — 94660 CPAP INITIATION&MGMT: CPT

## 2021-09-07 PROCEDURE — 80053 COMPREHEN METABOLIC PANEL: CPT | Performed by: STUDENT IN AN ORGANIZED HEALTH CARE EDUCATION/TRAINING PROGRAM

## 2021-09-07 PROCEDURE — 99900035 HC TECH TIME PER 15 MIN (STAT)

## 2021-09-07 PROCEDURE — 85025 COMPLETE CBC W/AUTO DIFF WBC: CPT | Performed by: STUDENT IN AN ORGANIZED HEALTH CARE EDUCATION/TRAINING PROGRAM

## 2021-09-07 PROCEDURE — 99239 PR HOSPITAL DISCHARGE DAY,>30 MIN: ICD-10-PCS | Mod: GC,,, | Performed by: HOSPITALIST

## 2021-09-07 PROCEDURE — 25000003 PHARM REV CODE 250: Performed by: HOSPITALIST

## 2021-09-07 RX ORDER — INSULIN ASPART 100 [IU]/ML
2 INJECTION, SOLUTION INTRAVENOUS; SUBCUTANEOUS
Status: DISCONTINUED | OUTPATIENT
Start: 2021-09-07 | End: 2021-09-07

## 2021-09-07 RX ORDER — AMLODIPINE BESYLATE 10 MG/1
10 TABLET ORAL DAILY
Status: DISCONTINUED | OUTPATIENT
Start: 2021-09-07 | End: 2021-09-08 | Stop reason: HOSPADM

## 2021-09-07 RX ORDER — CLONIDINE HYDROCHLORIDE 0.1 MG/1
0.2 TABLET ORAL 3 TIMES DAILY
Status: DISCONTINUED | OUTPATIENT
Start: 2021-09-07 | End: 2021-09-08 | Stop reason: HOSPADM

## 2021-09-07 RX ORDER — INSULIN GLARGINE 100 [IU]/ML
10 INJECTION, SOLUTION SUBCUTANEOUS 2 TIMES DAILY
Status: ON HOLD
Start: 2021-09-07 | End: 2021-12-29 | Stop reason: HOSPADM

## 2021-09-07 RX ORDER — SUMATRIPTAN SUCCINATE 100 MG/1
50 TABLET ORAL 2 TIMES DAILY PRN
Start: 2021-09-07 | End: 2021-09-15

## 2021-09-07 RX ORDER — CEFDINIR 300 MG/1
300 CAPSULE ORAL DAILY
Qty: 5 CAPSULE | Refills: 0
Start: 2021-09-08 | End: 2021-09-13

## 2021-09-07 RX ORDER — HYDRALAZINE HYDROCHLORIDE 20 MG/ML
10 INJECTION INTRAMUSCULAR; INTRAVENOUS ONCE
Status: COMPLETED | OUTPATIENT
Start: 2021-09-07 | End: 2021-09-07

## 2021-09-07 RX ORDER — INSULIN ASPART 100 [IU]/ML
7 INJECTION, SOLUTION INTRAVENOUS; SUBCUTANEOUS
Status: DISCONTINUED | OUTPATIENT
Start: 2021-09-07 | End: 2021-09-08 | Stop reason: HOSPADM

## 2021-09-07 RX ADMIN — LEVOTHYROXINE SODIUM 50 MCG: 50 TABLET ORAL at 06:09

## 2021-09-07 RX ADMIN — HEPARIN SODIUM 5000 UNITS: 5000 INJECTION INTRAVENOUS; SUBCUTANEOUS at 02:09

## 2021-09-07 RX ADMIN — HEPARIN SODIUM 5000 UNITS: 5000 INJECTION INTRAVENOUS; SUBCUTANEOUS at 09:09

## 2021-09-07 RX ADMIN — SODIUM BICARBONATE 1300 MG: 650 TABLET ORAL at 09:09

## 2021-09-07 RX ADMIN — SODIUM BICARBONATE 1300 MG: 650 TABLET ORAL at 02:09

## 2021-09-07 RX ADMIN — INSULIN ASPART 7 UNITS: 100 INJECTION, SOLUTION INTRAVENOUS; SUBCUTANEOUS at 05:09

## 2021-09-07 RX ADMIN — CEFTRIAXONE 1 G: 1 INJECTION, POWDER, FOR SOLUTION INTRAMUSCULAR; INTRAVENOUS at 03:09

## 2021-09-07 RX ADMIN — INSULIN DETEMIR 5 UNITS: 100 INJECTION, SOLUTION SUBCUTANEOUS at 08:09

## 2021-09-07 RX ADMIN — CLONIDINE HYDROCHLORIDE 0.2 MG: 0.1 TABLET ORAL at 02:09

## 2021-09-07 RX ADMIN — INSULIN ASPART 6 UNITS: 100 INJECTION, SOLUTION INTRAVENOUS; SUBCUTANEOUS at 05:09

## 2021-09-07 RX ADMIN — SERTRALINE HYDROCHLORIDE 25 MG: 25 TABLET ORAL at 08:09

## 2021-09-07 RX ADMIN — INSULIN ASPART 4 UNITS: 100 INJECTION, SOLUTION INTRAVENOUS; SUBCUTANEOUS at 08:09

## 2021-09-07 RX ADMIN — INSULIN ASPART 5 UNITS: 100 INJECTION, SOLUTION INTRAVENOUS; SUBCUTANEOUS at 08:09

## 2021-09-07 RX ADMIN — FERROUS SULFATE TAB EC 325 MG (65 MG FE EQUIVALENT) 1 EACH: 325 (65 FE) TABLET DELAYED RESPONSE at 08:09

## 2021-09-07 RX ADMIN — HYDRALAZINE HYDROCHLORIDE 10 MG: 20 INJECTION INTRAMUSCULAR; INTRAVENOUS at 04:09

## 2021-09-07 RX ADMIN — CLONIDINE HYDROCHLORIDE 0.2 MG: 0.1 TABLET ORAL at 09:09

## 2021-09-07 RX ADMIN — OXYBUTYNIN CHLORIDE 10 MG: 10 TABLET, EXTENDED RELEASE ORAL at 08:09

## 2021-09-07 RX ADMIN — AMLODIPINE BESYLATE 10 MG: 10 TABLET ORAL at 12:09

## 2021-09-07 RX ADMIN — SODIUM BICARBONATE 1300 MG: 650 TABLET ORAL at 08:09

## 2021-09-07 RX ADMIN — HEPARIN SODIUM 5000 UNITS: 5000 INJECTION INTRAVENOUS; SUBCUTANEOUS at 06:09

## 2021-09-07 RX ADMIN — INSULIN ASPART 6 UNITS: 100 INJECTION, SOLUTION INTRAVENOUS; SUBCUTANEOUS at 12:09

## 2021-09-07 RX ADMIN — INSULIN ASPART 2 UNITS: 100 INJECTION, SOLUTION INTRAVENOUS; SUBCUTANEOUS at 09:09

## 2021-09-07 RX ADMIN — INSULIN ASPART 7 UNITS: 100 INJECTION, SOLUTION INTRAVENOUS; SUBCUTANEOUS at 12:09

## 2021-09-07 RX ADMIN — SUMATRIPTAN SUCCINATE 50 MG: 50 TABLET ORAL at 02:09

## 2021-09-07 RX ADMIN — CLONIDINE HYDROCHLORIDE 0.3 MG: 0.3 TABLET ORAL at 08:09

## 2021-09-12 LAB — BACTERIA UR CULT: ABNORMAL

## 2021-09-16 ENCOUNTER — TELEPHONE (OUTPATIENT)
Dept: INTERNAL MEDICINE | Facility: CLINIC | Age: 71
End: 2021-09-16

## 2021-09-21 ENCOUNTER — TELEPHONE (OUTPATIENT)
Dept: INTERNAL MEDICINE | Facility: CLINIC | Age: 71
End: 2021-09-21

## 2021-09-28 ENCOUNTER — HOSPITAL ENCOUNTER (INPATIENT)
Facility: HOSPITAL | Age: 71
LOS: 5 days | Discharge: HOME | DRG: 698 | End: 2021-10-03
Attending: EMERGENCY MEDICINE | Admitting: STUDENT IN AN ORGANIZED HEALTH CARE EDUCATION/TRAINING PROGRAM
Payer: MEDICARE

## 2021-09-28 DIAGNOSIS — A41.9 SEPSIS, DUE TO UNSPECIFIED ORGANISM, UNSPECIFIED WHETHER ACUTE ORGAN DYSFUNCTION PRESENT: Primary | ICD-10-CM

## 2021-09-28 DIAGNOSIS — N39.0 URINARY TRACT INFECTION ASSOCIATED WITH CYSTOSTOMY CATHETER, INITIAL ENCOUNTER: ICD-10-CM

## 2021-09-28 DIAGNOSIS — G93.41 SEPTIC ENCEPHALOPATHY: ICD-10-CM

## 2021-09-28 DIAGNOSIS — T83.510A URINARY TRACT INFECTION ASSOCIATED WITH CYSTOSTOMY CATHETER, INITIAL ENCOUNTER: ICD-10-CM

## 2021-09-28 DIAGNOSIS — N30.00 ACUTE CYSTITIS WITHOUT HEMATURIA: ICD-10-CM

## 2021-09-28 DIAGNOSIS — R41.82 ALTERED MENTAL STATUS: ICD-10-CM

## 2021-09-28 PROBLEM — N18.4 STAGE 4 CHRONIC KIDNEY DISEASE DUE TO TYPE 2 DIABETES MELLITUS: Status: ACTIVE | Noted: 2019-01-20

## 2021-09-28 PROBLEM — N11.1: Status: ACTIVE | Noted: 2021-07-07

## 2021-09-28 PROBLEM — E11.22 STAGE 4 CHRONIC KIDNEY DISEASE DUE TO TYPE 2 DIABETES MELLITUS: Status: ACTIVE | Noted: 2019-01-20

## 2021-09-28 LAB
ALBUMIN SERPL BCP-MCNC: 2.4 G/DL (ref 3.5–5.2)
ALLENS TEST: ABNORMAL
ALP SERPL-CCNC: 143 U/L (ref 55–135)
ALT SERPL W/O P-5'-P-CCNC: 12 U/L (ref 10–44)
AMPHET+METHAMPHET UR QL: NEGATIVE
ANION GAP SERPL CALC-SCNC: 16 MMOL/L (ref 8–16)
AST SERPL-CCNC: 12 U/L (ref 10–40)
BACTERIA #/AREA URNS AUTO: ABNORMAL /HPF
BARBITURATES UR QL SCN>200 NG/ML: ABNORMAL
BASOPHILS # BLD AUTO: 0.03 K/UL (ref 0–0.2)
BASOPHILS NFR BLD: 0.2 % (ref 0–1.9)
BENZODIAZ UR QL SCN>200 NG/ML: NEGATIVE
BILIRUB SERPL-MCNC: 0.3 MG/DL (ref 0.1–1)
BILIRUB UR QL STRIP: NEGATIVE
BUN SERPL-MCNC: 42 MG/DL (ref 6–30)
BUN SERPL-MCNC: 46 MG/DL (ref 8–23)
BZE UR QL SCN: NEGATIVE
CALCIUM SERPL-MCNC: 9 MG/DL (ref 8.7–10.5)
CANNABINOIDS UR QL SCN: NEGATIVE
CHLORIDE SERPL-SCNC: 105 MMOL/L (ref 95–110)
CHLORIDE SERPL-SCNC: 106 MMOL/L (ref 95–110)
CK SERPL-CCNC: 32 U/L (ref 20–180)
CLARITY UR REFRACT.AUTO: ABNORMAL
CO2 SERPL-SCNC: 17 MMOL/L (ref 23–29)
COLOR UR AUTO: YELLOW
CREAT SERPL-MCNC: 2.8 MG/DL (ref 0.5–1.4)
CREAT SERPL-MCNC: 2.8 MG/DL (ref 0.5–1.4)
CREAT UR-MCNC: 49 MG/DL (ref 15–325)
CTP QC/QA: YES
DELSYS: ABNORMAL
DIFFERENTIAL METHOD: ABNORMAL
EOSINOPHIL # BLD AUTO: 0 K/UL (ref 0–0.5)
EOSINOPHIL NFR BLD: 0 % (ref 0–8)
ERYTHROCYTE [DISTWIDTH] IN BLOOD BY AUTOMATED COUNT: 13.1 % (ref 11.5–14.5)
EST. GFR  (AFRICAN AMERICAN): 19.1 ML/MIN/1.73 M^2
EST. GFR  (NON AFRICAN AMERICAN): 16.6 ML/MIN/1.73 M^2
GLUCOSE SERPL-MCNC: 265 MG/DL (ref 70–110)
GLUCOSE SERPL-MCNC: 274 MG/DL (ref 70–110)
GLUCOSE UR QL STRIP: ABNORMAL
HCO3 UR-SCNC: 21.2 MMOL/L (ref 24–28)
HCT VFR BLD AUTO: 34 % (ref 37–48.5)
HCT VFR BLD CALC: 33 %PCV (ref 36–54)
HGB BLD-MCNC: 11.3 G/DL (ref 12–16)
HGB UR QL STRIP: ABNORMAL
HYALINE CASTS UR QL AUTO: 0 /LPF
IMM GRANULOCYTES # BLD AUTO: 0.19 K/UL (ref 0–0.04)
IMM GRANULOCYTES NFR BLD AUTO: 1.5 % (ref 0–0.5)
KETONES UR QL STRIP: ABNORMAL
LACTATE SERPL-SCNC: 1.1 MMOL/L (ref 0.5–2.2)
LACTATE SERPL-SCNC: 1.1 MMOL/L (ref 0.5–2.2)
LEUKOCYTE ESTERASE UR QL STRIP: ABNORMAL
LYMPHOCYTES # BLD AUTO: 1.3 K/UL (ref 1–4.8)
LYMPHOCYTES NFR BLD: 10.2 % (ref 18–48)
MAGNESIUM SERPL-MCNC: 2.4 MG/DL (ref 1.6–2.6)
MCH RBC QN AUTO: 28.6 PG (ref 27–31)
MCHC RBC AUTO-ENTMCNC: 33.2 G/DL (ref 32–36)
MCV RBC AUTO: 86 FL (ref 82–98)
METHADONE UR QL SCN>300 NG/ML: NEGATIVE
MICROSCOPIC COMMENT: ABNORMAL
MODE: ABNORMAL
MONOCYTES # BLD AUTO: 0.5 K/UL (ref 0.3–1)
MONOCYTES NFR BLD: 3.6 % (ref 4–15)
NEUTROPHILS # BLD AUTO: 11 K/UL (ref 1.8–7.7)
NEUTROPHILS NFR BLD: 84.5 % (ref 38–73)
NITRITE UR QL STRIP: POSITIVE
NRBC BLD-RTO: 0 /100 WBC
OPIATES UR QL SCN: ABNORMAL
PCO2 BLDA: 29.5 MMHG (ref 35–45)
PCP UR QL SCN>25 NG/ML: NEGATIVE
PH SMN: 7.46 [PH] (ref 7.35–7.45)
PH UR STRIP: 6 [PH] (ref 5–8)
PHOSPHATE SERPL-MCNC: 4 MG/DL (ref 2.7–4.5)
PLATELET # BLD AUTO: 457 K/UL (ref 150–450)
PMV BLD AUTO: 8.7 FL (ref 9.2–12.9)
PO2 BLDA: 87 MMHG (ref 80–100)
POC BE: -3 MMOL/L
POC IONIZED CALCIUM: 1.15 MMOL/L (ref 1.06–1.42)
POC SATURATED O2: 97 % (ref 95–100)
POC TCO2 (MEASURED): 21 MMOL/L (ref 23–29)
POC TCO2: 22 MMOL/L (ref 23–27)
POTASSIUM BLD-SCNC: 4.5 MMOL/L (ref 3.5–5.1)
POTASSIUM SERPL-SCNC: 4.5 MMOL/L (ref 3.5–5.1)
PROT SERPL-MCNC: 6.7 G/DL (ref 6–8.4)
PROT UR QL STRIP: ABNORMAL
RBC # BLD AUTO: 3.95 M/UL (ref 4–5.4)
RBC #/AREA URNS AUTO: 17 /HPF (ref 0–4)
SAMPLE: ABNORMAL
SAMPLE: ABNORMAL
SARS-COV-2 RDRP RESP QL NAA+PROBE: NEGATIVE
SITE: ABNORMAL
SODIUM BLD-SCNC: 138 MMOL/L (ref 136–145)
SODIUM SERPL-SCNC: 138 MMOL/L (ref 136–145)
SP GR UR STRIP: 1.02 (ref 1–1.03)
SQUAMOUS #/AREA URNS AUTO: 0 /HPF
TOXICOLOGY INFORMATION: ABNORMAL
TROPONIN I SERPL DL<=0.01 NG/ML-MCNC: 0.13 NG/ML (ref 0–0.03)
URN SPEC COLLECT METH UR: ABNORMAL
WBC # BLD AUTO: 12.95 K/UL (ref 3.9–12.7)
WBC #/AREA URNS AUTO: >100 /HPF (ref 0–5)
WBC CLUMPS UR QL AUTO: ABNORMAL

## 2021-09-28 PROCEDURE — 82803 BLOOD GASES ANY COMBINATION: CPT

## 2021-09-28 PROCEDURE — 99900035 HC TECH TIME PER 15 MIN (STAT)

## 2021-09-28 PROCEDURE — 99291 PR CRITICAL CARE, E/M 30-74 MINUTES: ICD-10-PCS | Mod: CS,,, | Performed by: EMERGENCY MEDICINE

## 2021-09-28 PROCEDURE — 96366 THER/PROPH/DIAG IV INF ADDON: CPT

## 2021-09-28 PROCEDURE — 93010 EKG 12-LEAD: ICD-10-PCS | Mod: ,,, | Performed by: INTERNAL MEDICINE

## 2021-09-28 PROCEDURE — 80307 DRUG TEST PRSMV CHEM ANLYZR: CPT | Performed by: PHYSICIAN ASSISTANT

## 2021-09-28 PROCEDURE — 84484 ASSAY OF TROPONIN QUANT: CPT | Performed by: PHYSICIAN ASSISTANT

## 2021-09-28 PROCEDURE — 87077 CULTURE AEROBIC IDENTIFY: CPT | Performed by: PHYSICIAN ASSISTANT

## 2021-09-28 PROCEDURE — 80053 COMPREHEN METABOLIC PANEL: CPT | Performed by: PHYSICIAN ASSISTANT

## 2021-09-28 PROCEDURE — 51702 INSERT TEMP BLADDER CATH: CPT

## 2021-09-28 PROCEDURE — 93010 ELECTROCARDIOGRAM REPORT: CPT | Mod: ,,, | Performed by: INTERNAL MEDICINE

## 2021-09-28 PROCEDURE — 83605 ASSAY OF LACTIC ACID: CPT | Performed by: PHYSICIAN ASSISTANT

## 2021-09-28 PROCEDURE — 87186 SC STD MICRODIL/AGAR DIL: CPT | Performed by: PHYSICIAN ASSISTANT

## 2021-09-28 PROCEDURE — 96367 TX/PROPH/DG ADDL SEQ IV INF: CPT

## 2021-09-28 PROCEDURE — 93005 ELECTROCARDIOGRAM TRACING: CPT

## 2021-09-28 PROCEDURE — 96375 TX/PRO/DX INJ NEW DRUG ADDON: CPT

## 2021-09-28 PROCEDURE — 80047 BASIC METABLC PNL IONIZED CA: CPT

## 2021-09-28 PROCEDURE — 87088 URINE BACTERIA CULTURE: CPT | Performed by: PHYSICIAN ASSISTANT

## 2021-09-28 PROCEDURE — 85025 COMPLETE CBC W/AUTO DIFF WBC: CPT | Performed by: PHYSICIAN ASSISTANT

## 2021-09-28 PROCEDURE — 63600175 PHARM REV CODE 636 W HCPCS: Performed by: STUDENT IN AN ORGANIZED HEALTH CARE EDUCATION/TRAINING PROGRAM

## 2021-09-28 PROCEDURE — 99285 EMERGENCY DEPT VISIT HI MDM: CPT | Mod: 25

## 2021-09-28 PROCEDURE — 81001 URINALYSIS AUTO W/SCOPE: CPT | Performed by: PHYSICIAN ASSISTANT

## 2021-09-28 PROCEDURE — 12000002 HC ACUTE/MED SURGE SEMI-PRIVATE ROOM

## 2021-09-28 PROCEDURE — 63600175 PHARM REV CODE 636 W HCPCS: Performed by: EMERGENCY MEDICINE

## 2021-09-28 PROCEDURE — 87086 URINE CULTURE/COLONY COUNT: CPT | Performed by: PHYSICIAN ASSISTANT

## 2021-09-28 PROCEDURE — 99291 CRITICAL CARE FIRST HOUR: CPT | Mod: CS,,, | Performed by: EMERGENCY MEDICINE

## 2021-09-28 PROCEDURE — U0002 COVID-19 LAB TEST NON-CDC: HCPCS | Performed by: PHYSICIAN ASSISTANT

## 2021-09-28 PROCEDURE — 25000003 PHARM REV CODE 250: Performed by: EMERGENCY MEDICINE

## 2021-09-28 PROCEDURE — 36600 WITHDRAWAL OF ARTERIAL BLOOD: CPT

## 2021-09-28 PROCEDURE — 87040 BLOOD CULTURE FOR BACTERIA: CPT | Performed by: PHYSICIAN ASSISTANT

## 2021-09-28 PROCEDURE — 83735 ASSAY OF MAGNESIUM: CPT | Performed by: PHYSICIAN ASSISTANT

## 2021-09-28 PROCEDURE — 84100 ASSAY OF PHOSPHORUS: CPT | Performed by: PHYSICIAN ASSISTANT

## 2021-09-28 PROCEDURE — 96365 THER/PROPH/DIAG IV INF INIT: CPT

## 2021-09-28 PROCEDURE — 25000003 PHARM REV CODE 250: Performed by: PHYSICIAN ASSISTANT

## 2021-09-28 PROCEDURE — 63600175 PHARM REV CODE 636 W HCPCS: Performed by: PHYSICIAN ASSISTANT

## 2021-09-28 PROCEDURE — 82550 ASSAY OF CK (CPK): CPT | Performed by: PHYSICIAN ASSISTANT

## 2021-09-28 RX ORDER — MICONAZOLE NITRATE 2 %
POWDER (GRAM) TOPICAL 2 TIMES DAILY
Status: DISCONTINUED | OUTPATIENT
Start: 2021-09-28 | End: 2021-10-03 | Stop reason: HOSPADM

## 2021-09-28 RX ORDER — HYDRALAZINE HYDROCHLORIDE 20 MG/ML
5 INJECTION INTRAMUSCULAR; INTRAVENOUS
Status: COMPLETED | OUTPATIENT
Start: 2021-09-28 | End: 2021-09-28

## 2021-09-28 RX ORDER — FUROSEMIDE 10 MG/ML
40 INJECTION INTRAMUSCULAR; INTRAVENOUS ONCE
Status: COMPLETED | OUTPATIENT
Start: 2021-09-28 | End: 2021-09-28

## 2021-09-28 RX ORDER — CLONIDINE 0.2 MG/24H
1 PATCH, EXTENDED RELEASE TRANSDERMAL
Status: DISCONTINUED | OUTPATIENT
Start: 2021-09-28 | End: 2021-10-05

## 2021-09-28 RX ORDER — HYDRALAZINE HYDROCHLORIDE 20 MG/ML
10 INJECTION INTRAMUSCULAR; INTRAVENOUS
Status: DISCONTINUED | OUTPATIENT
Start: 2021-09-28 | End: 2021-09-28

## 2021-09-28 RX ORDER — ACETAMINOPHEN 650 MG/1
650 SUPPOSITORY RECTAL
Status: COMPLETED | OUTPATIENT
Start: 2021-09-28 | End: 2021-09-28

## 2021-09-28 RX ORDER — CLONIDINE HYDROCHLORIDE 0.1 MG/1
0.2 TABLET ORAL
Status: DISCONTINUED | OUTPATIENT
Start: 2021-09-28 | End: 2021-09-28

## 2021-09-28 RX ADMIN — ACETAMINOPHEN 650 MG: 650 SUPPOSITORY RECTAL at 06:09

## 2021-09-28 RX ADMIN — HYDRALAZINE HYDROCHLORIDE 5 MG: 20 INJECTION, SOLUTION INTRAMUSCULAR; INTRAVENOUS at 09:09

## 2021-09-28 RX ADMIN — VANCOMYCIN HYDROCHLORIDE 2000 MG: 10 INJECTION, POWDER, LYOPHILIZED, FOR SOLUTION INTRAVENOUS at 06:09

## 2021-09-28 RX ADMIN — FUROSEMIDE 40 MG: 10 INJECTION, SOLUTION INTRAMUSCULAR; INTRAVENOUS at 09:09

## 2021-09-28 RX ADMIN — PIPERACILLIN SODIUM AND TAZOBACTAM SODIUM 4.5 G: 4; .5 INJECTION, POWDER, FOR SOLUTION INTRAVENOUS at 08:09

## 2021-09-28 RX ADMIN — SODIUM CHLORIDE, SODIUM LACTATE, POTASSIUM CHLORIDE, AND CALCIUM CHLORIDE 1710 ML: .6; .31; .03; .02 INJECTION, SOLUTION INTRAVENOUS at 06:09

## 2021-09-28 RX ADMIN — CLONIDINE 1 PATCH: 0.2 PATCH TRANSDERMAL at 10:09

## 2021-09-28 RX ADMIN — MICONAZOLE NITRATE: 20 POWDER TOPICAL at 10:09

## 2021-09-29 PROBLEM — E16.2 HYPOGLYCEMIA: Status: RESOLVED | Noted: 2021-01-08 | Resolved: 2021-09-29

## 2021-09-29 PROBLEM — N18.4 CKD (CHRONIC KIDNEY DISEASE) STAGE 4, GFR 15-29 ML/MIN: Chronic | Status: ACTIVE | Noted: 2021-02-14

## 2021-09-29 PROBLEM — N39.0 SEPSIS SECONDARY TO UTI: Status: ACTIVE | Noted: 2021-09-28

## 2021-09-29 PROBLEM — R40.1 STUPOR: Status: ACTIVE | Noted: 2021-09-28

## 2021-09-29 PROBLEM — N18.4 CKD STAGE 4 DUE TO TYPE 2 DIABETES MELLITUS: Chronic | Status: ACTIVE | Noted: 2019-01-20

## 2021-09-29 PROBLEM — N39.0 URINARY TRACT INFECTION ASSOCIATED WITH CYSTOSTOMY CATHETER: Status: ACTIVE | Noted: 2021-09-29

## 2021-09-29 PROBLEM — E66.01 MORBID OBESITY DUE TO EXCESS CALORIES: Chronic | Status: ACTIVE | Noted: 2017-01-09

## 2021-09-29 PROBLEM — R11.0 NAUSEA: Status: RESOLVED | Noted: 2021-09-05 | Resolved: 2021-09-29

## 2021-09-29 PROBLEM — G89.29 CHRONIC PAIN: Chronic | Status: ACTIVE | Noted: 2018-03-15

## 2021-09-29 PROBLEM — E11.22 STAGE 4 CHRONIC KIDNEY DISEASE DUE TO TYPE 2 DIABETES MELLITUS: Chronic | Status: ACTIVE | Noted: 2019-01-20

## 2021-09-29 PROBLEM — E11.22 CKD STAGE 4 DUE TO TYPE 2 DIABETES MELLITUS: Chronic | Status: ACTIVE | Noted: 2019-01-20

## 2021-09-29 PROBLEM — G43.809 CERVICOGENIC MIGRAINE: Chronic | Status: ACTIVE | Noted: 2019-02-01

## 2021-09-29 PROBLEM — Z79.891 LONG TERM PRESCRIPTION OPIATE USE: Chronic | Status: ACTIVE | Noted: 2017-01-26

## 2021-09-29 PROBLEM — T83.510A URINARY TRACT INFECTION ASSOCIATED WITH CYSTOSTOMY CATHETER: Status: ACTIVE | Noted: 2021-09-29

## 2021-09-29 PROBLEM — N18.4 STAGE 4 CHRONIC KIDNEY DISEASE DUE TO TYPE 2 DIABETES MELLITUS: Chronic | Status: ACTIVE | Noted: 2019-01-20

## 2021-09-29 PROBLEM — Z90.12 S/P LEFT MASTECTOMY: Chronic | Status: ACTIVE | Noted: 2019-09-25

## 2021-09-29 PROBLEM — R11.2 NAUSEA AND VOMITING: Status: RESOLVED | Noted: 2021-07-07 | Resolved: 2021-09-29

## 2021-09-29 PROBLEM — E87.5 HYPERKALEMIA: Status: RESOLVED | Noted: 2021-07-04 | Resolved: 2021-09-29

## 2021-09-29 LAB
ANION GAP SERPL CALC-SCNC: 15 MMOL/L (ref 8–16)
BASOPHILS # BLD AUTO: 0.06 K/UL (ref 0–0.2)
BASOPHILS NFR BLD: 0.4 % (ref 0–1.9)
BUN SERPL-MCNC: 44 MG/DL (ref 8–23)
CALCIUM SERPL-MCNC: 8.7 MG/DL (ref 8.7–10.5)
CHLORIDE SERPL-SCNC: 106 MMOL/L (ref 95–110)
CO2 SERPL-SCNC: 17 MMOL/L (ref 23–29)
CREAT SERPL-MCNC: 2.8 MG/DL (ref 0.5–1.4)
DIFFERENTIAL METHOD: ABNORMAL
EOSINOPHIL # BLD AUTO: 0.1 K/UL (ref 0–0.5)
EOSINOPHIL NFR BLD: 0.5 % (ref 0–8)
ERYTHROCYTE [DISTWIDTH] IN BLOOD BY AUTOMATED COUNT: 13.2 % (ref 11.5–14.5)
EST. GFR  (AFRICAN AMERICAN): 19.1 ML/MIN/1.73 M^2
EST. GFR  (NON AFRICAN AMERICAN): 16.6 ML/MIN/1.73 M^2
GLUCOSE SERPL-MCNC: 198 MG/DL (ref 70–110)
HCT VFR BLD AUTO: 30.9 % (ref 37–48.5)
HGB BLD-MCNC: 10.3 G/DL (ref 12–16)
IMM GRANULOCYTES # BLD AUTO: 0.16 K/UL (ref 0–0.04)
IMM GRANULOCYTES NFR BLD AUTO: 1.2 % (ref 0–0.5)
LYMPHOCYTES # BLD AUTO: 2.8 K/UL (ref 1–4.8)
LYMPHOCYTES NFR BLD: 20.1 % (ref 18–48)
MAGNESIUM SERPL-MCNC: 2.2 MG/DL (ref 1.6–2.6)
MCH RBC QN AUTO: 28.5 PG (ref 27–31)
MCHC RBC AUTO-ENTMCNC: 33.3 G/DL (ref 32–36)
MCV RBC AUTO: 86 FL (ref 82–98)
MONOCYTES # BLD AUTO: 1.5 K/UL (ref 0.3–1)
MONOCYTES NFR BLD: 11.2 % (ref 4–15)
NEUTROPHILS # BLD AUTO: 9.2 K/UL (ref 1.8–7.7)
NEUTROPHILS NFR BLD: 66.6 % (ref 38–73)
NRBC BLD-RTO: 0 /100 WBC
PHOSPHATE SERPL-MCNC: 3.5 MG/DL (ref 2.7–4.5)
PLATELET # BLD AUTO: 389 K/UL (ref 150–450)
PMV BLD AUTO: 8.4 FL (ref 9.2–12.9)
POCT GLUCOSE: 194 MG/DL (ref 70–110)
POCT GLUCOSE: 198 MG/DL (ref 70–110)
POCT GLUCOSE: 204 MG/DL (ref 70–110)
POCT GLUCOSE: 217 MG/DL (ref 70–110)
POCT GLUCOSE: 330 MG/DL (ref 70–110)
POTASSIUM SERPL-SCNC: 3.7 MMOL/L (ref 3.5–5.1)
RBC # BLD AUTO: 3.61 M/UL (ref 4–5.4)
SODIUM SERPL-SCNC: 138 MMOL/L (ref 136–145)
TROPONIN I SERPL DL<=0.01 NG/ML-MCNC: 0.13 NG/ML (ref 0–0.03)
VANCOMYCIN SERPL-MCNC: 14 UG/ML
WBC # BLD AUTO: 13.75 K/UL (ref 3.9–12.7)

## 2021-09-29 PROCEDURE — 20600001 HC STEP DOWN PRIVATE ROOM

## 2021-09-29 PROCEDURE — 36415 COLL VENOUS BLD VENIPUNCTURE: CPT | Performed by: HOSPITALIST

## 2021-09-29 PROCEDURE — 84100 ASSAY OF PHOSPHORUS: CPT | Performed by: HOSPITALIST

## 2021-09-29 PROCEDURE — 25000003 PHARM REV CODE 250: Performed by: STUDENT IN AN ORGANIZED HEALTH CARE EDUCATION/TRAINING PROGRAM

## 2021-09-29 PROCEDURE — 85025 COMPLETE CBC W/AUTO DIFF WBC: CPT | Performed by: HOSPITALIST

## 2021-09-29 PROCEDURE — 25000003 PHARM REV CODE 250: Performed by: HOSPITALIST

## 2021-09-29 PROCEDURE — 25000003 PHARM REV CODE 250: Performed by: INTERNAL MEDICINE

## 2021-09-29 PROCEDURE — 87040 BLOOD CULTURE FOR BACTERIA: CPT | Mod: 59 | Performed by: HOSPITALIST

## 2021-09-29 PROCEDURE — 80202 ASSAY OF VANCOMYCIN: CPT | Performed by: STUDENT IN AN ORGANIZED HEALTH CARE EDUCATION/TRAINING PROGRAM

## 2021-09-29 PROCEDURE — 84484 ASSAY OF TROPONIN QUANT: CPT | Performed by: HOSPITALIST

## 2021-09-29 PROCEDURE — C9399 UNCLASSIFIED DRUGS OR BIOLOG: HCPCS | Performed by: HOSPITALIST

## 2021-09-29 PROCEDURE — 63600175 PHARM REV CODE 636 W HCPCS: Performed by: HOSPITALIST

## 2021-09-29 PROCEDURE — 99223 1ST HOSP IP/OBS HIGH 75: CPT | Mod: ,,, | Performed by: HOSPITALIST

## 2021-09-29 PROCEDURE — 80048 BASIC METABOLIC PNL TOTAL CA: CPT | Performed by: HOSPITALIST

## 2021-09-29 PROCEDURE — 63600175 PHARM REV CODE 636 W HCPCS: Performed by: STUDENT IN AN ORGANIZED HEALTH CARE EDUCATION/TRAINING PROGRAM

## 2021-09-29 PROCEDURE — 63600175 PHARM REV CODE 636 W HCPCS: Performed by: INTERNAL MEDICINE

## 2021-09-29 PROCEDURE — 83735 ASSAY OF MAGNESIUM: CPT | Performed by: HOSPITALIST

## 2021-09-29 PROCEDURE — 25000003 PHARM REV CODE 250: Performed by: PHYSICIAN ASSISTANT

## 2021-09-29 PROCEDURE — 99223 PR INITIAL HOSPITAL CARE,LEVL III: ICD-10-PCS | Mod: ,,, | Performed by: HOSPITALIST

## 2021-09-29 RX ORDER — TRAZODONE HYDROCHLORIDE 100 MG/1
100 TABLET ORAL NIGHTLY PRN
Status: DISCONTINUED | OUTPATIENT
Start: 2021-09-29 | End: 2021-10-03 | Stop reason: HOSPADM

## 2021-09-29 RX ORDER — LABETALOL HCL 20 MG/4 ML
10 SYRINGE (ML) INTRAVENOUS EVERY 6 HOURS PRN
Status: DISCONTINUED | OUTPATIENT
Start: 2021-09-29 | End: 2021-10-03 | Stop reason: HOSPADM

## 2021-09-29 RX ORDER — NALOXONE HCL 0.4 MG/ML
0.02 VIAL (ML) INJECTION
Status: DISCONTINUED | OUTPATIENT
Start: 2021-09-29 | End: 2021-10-03 | Stop reason: HOSPADM

## 2021-09-29 RX ORDER — VANCOMYCIN HCL IN 5 % DEXTROSE 1G/250ML
1000 PLASTIC BAG, INJECTION (ML) INTRAVENOUS ONCE
Status: COMPLETED | OUTPATIENT
Start: 2021-09-29 | End: 2021-09-29

## 2021-09-29 RX ORDER — GLUCAGON 1 MG
1 KIT INJECTION
Status: DISCONTINUED | OUTPATIENT
Start: 2021-09-29 | End: 2021-10-03 | Stop reason: HOSPADM

## 2021-09-29 RX ORDER — ISOSORBIDE MONONITRATE 60 MG/1
120 TABLET, EXTENDED RELEASE ORAL DAILY
Status: DISCONTINUED | OUTPATIENT
Start: 2021-09-29 | End: 2021-10-03 | Stop reason: HOSPADM

## 2021-09-29 RX ORDER — LEVOTHYROXINE SODIUM 50 UG/1
50 TABLET ORAL
Status: DISCONTINUED | OUTPATIENT
Start: 2021-09-29 | End: 2021-10-03 | Stop reason: HOSPADM

## 2021-09-29 RX ORDER — PRAVASTATIN SODIUM 40 MG/1
80 TABLET ORAL DAILY
Status: DISCONTINUED | OUTPATIENT
Start: 2021-09-29 | End: 2021-10-03 | Stop reason: HOSPADM

## 2021-09-29 RX ORDER — HYDRALAZINE HYDROCHLORIDE 20 MG/ML
5 INJECTION INTRAMUSCULAR; INTRAVENOUS EVERY 8 HOURS PRN
Status: DISCONTINUED | OUTPATIENT
Start: 2021-09-29 | End: 2021-10-03 | Stop reason: HOSPADM

## 2021-09-29 RX ORDER — IBUPROFEN 200 MG
24 TABLET ORAL
Status: DISCONTINUED | OUTPATIENT
Start: 2021-09-29 | End: 2021-10-03 | Stop reason: HOSPADM

## 2021-09-29 RX ORDER — LANOLIN ALCOHOL/MO/W.PET/CERES
400 CREAM (GRAM) TOPICAL 2 TIMES DAILY
Status: DISCONTINUED | OUTPATIENT
Start: 2021-09-29 | End: 2021-10-03 | Stop reason: HOSPADM

## 2021-09-29 RX ORDER — SERTRALINE HYDROCHLORIDE 25 MG/1
25 TABLET, FILM COATED ORAL DAILY
Status: DISCONTINUED | OUTPATIENT
Start: 2021-09-29 | End: 2021-10-03 | Stop reason: HOSPADM

## 2021-09-29 RX ORDER — CLONIDINE HYDROCHLORIDE 0.1 MG/1
0.2 TABLET ORAL 3 TIMES DAILY
Status: DISCONTINUED | OUTPATIENT
Start: 2021-09-29 | End: 2021-09-29

## 2021-09-29 RX ORDER — GEMFIBROZIL 600 MG/1
600 TABLET, FILM COATED ORAL
Status: DISCONTINUED | OUTPATIENT
Start: 2021-09-29 | End: 2021-09-29

## 2021-09-29 RX ORDER — HYDRALAZINE HYDROCHLORIDE 20 MG/ML
5 INJECTION INTRAMUSCULAR; INTRAVENOUS ONCE
Status: COMPLETED | OUTPATIENT
Start: 2021-09-29 | End: 2021-09-29

## 2021-09-29 RX ORDER — HEPARIN SODIUM 5000 [USP'U]/ML
7500 INJECTION, SOLUTION INTRAVENOUS; SUBCUTANEOUS EVERY 8 HOURS
Status: DISCONTINUED | OUTPATIENT
Start: 2021-09-29 | End: 2021-10-03 | Stop reason: HOSPADM

## 2021-09-29 RX ORDER — SODIUM CHLORIDE 0.9 % (FLUSH) 0.9 %
10 SYRINGE (ML) INJECTION
Status: DISCONTINUED | OUTPATIENT
Start: 2021-09-29 | End: 2021-10-03 | Stop reason: HOSPADM

## 2021-09-29 RX ORDER — ACETAMINOPHEN 500 MG
1000 TABLET ORAL EVERY 8 HOURS PRN
Status: DISCONTINUED | OUTPATIENT
Start: 2021-09-29 | End: 2021-10-03 | Stop reason: HOSPADM

## 2021-09-29 RX ORDER — TALC
6 POWDER (GRAM) TOPICAL NIGHTLY PRN
Status: DISCONTINUED | OUTPATIENT
Start: 2021-09-29 | End: 2021-10-03 | Stop reason: HOSPADM

## 2021-09-29 RX ORDER — AMLODIPINE BESYLATE 10 MG/1
10 TABLET ORAL DAILY
Status: DISCONTINUED | OUTPATIENT
Start: 2021-09-29 | End: 2021-10-02

## 2021-09-29 RX ORDER — INSULIN ASPART 100 [IU]/ML
0-5 INJECTION, SOLUTION INTRAVENOUS; SUBCUTANEOUS
Status: DISCONTINUED | OUTPATIENT
Start: 2021-09-29 | End: 2021-10-03 | Stop reason: HOSPADM

## 2021-09-29 RX ORDER — PROPRANOLOL HYDROCHLORIDE 40 MG/1
80 TABLET ORAL DAILY
Status: DISCONTINUED | OUTPATIENT
Start: 2021-09-29 | End: 2021-10-02

## 2021-09-29 RX ORDER — CLONIDINE HYDROCHLORIDE 0.1 MG/1
0.2 TABLET ORAL 3 TIMES DAILY
Status: DISCONTINUED | OUTPATIENT
Start: 2021-09-29 | End: 2021-10-03 | Stop reason: HOSPADM

## 2021-09-29 RX ORDER — IBUPROFEN 200 MG
16 TABLET ORAL
Status: DISCONTINUED | OUTPATIENT
Start: 2021-09-29 | End: 2021-10-03 | Stop reason: HOSPADM

## 2021-09-29 RX ORDER — ACETAMINOPHEN 500 MG
1000 TABLET ORAL ONCE
Status: COMPLETED | OUTPATIENT
Start: 2021-09-29 | End: 2021-09-29

## 2021-09-29 RX ORDER — SODIUM BICARBONATE 650 MG/1
1300 TABLET ORAL 3 TIMES DAILY
Status: DISCONTINUED | OUTPATIENT
Start: 2021-09-29 | End: 2021-10-03 | Stop reason: HOSPADM

## 2021-09-29 RX ORDER — PROPRANOLOL HYDROCHLORIDE 80 MG/1
80 TABLET ORAL DAILY
Status: DISCONTINUED | OUTPATIENT
Start: 2021-09-29 | End: 2021-09-29

## 2021-09-29 RX ORDER — ANASTROZOLE 1 MG/1
1 TABLET ORAL DAILY
Status: DISCONTINUED | OUTPATIENT
Start: 2021-09-29 | End: 2021-10-03 | Stop reason: HOSPADM

## 2021-09-29 RX ADMIN — INSULIN DETEMIR 10 UNITS: 100 INJECTION, SOLUTION SUBCUTANEOUS at 08:09

## 2021-09-29 RX ADMIN — HYDRALAZINE HYDROCHLORIDE 5 MG: 20 INJECTION, SOLUTION INTRAMUSCULAR; INTRAVENOUS at 06:09

## 2021-09-29 RX ADMIN — ACETAMINOPHEN 1000 MG: 500 TABLET ORAL at 10:09

## 2021-09-29 RX ADMIN — INSULIN ASPART 2 UNITS: 100 INJECTION, SOLUTION INTRAVENOUS; SUBCUTANEOUS at 08:09

## 2021-09-29 RX ADMIN — CLONIDINE HYDROCHLORIDE 0.2 MG: 0.1 TABLET ORAL at 09:09

## 2021-09-29 RX ADMIN — ANASTROZOLE 1 MG: 1 TABLET, COATED ORAL at 12:09

## 2021-09-29 RX ADMIN — LABETALOL HYDROCHLORIDE 10 MG: 5 INJECTION, SOLUTION INTRAVENOUS at 05:09

## 2021-09-29 RX ADMIN — PIPERACILLIN SODIUM AND TAZOBACTAM SODIUM 4.5 G: 4; .5 INJECTION, POWDER, FOR SOLUTION INTRAVENOUS at 11:09

## 2021-09-29 RX ADMIN — SODIUM BICARBONATE 1300 MG: 650 TABLET ORAL at 02:09

## 2021-09-29 RX ADMIN — HEPARIN SODIUM 7500 UNITS: 5000 INJECTION INTRAVENOUS; SUBCUTANEOUS at 06:09

## 2021-09-29 RX ADMIN — SODIUM BICARBONATE 1300 MG: 650 TABLET ORAL at 09:09

## 2021-09-29 RX ADMIN — HEPARIN SODIUM 7500 UNITS: 5000 INJECTION INTRAVENOUS; SUBCUTANEOUS at 09:09

## 2021-09-29 RX ADMIN — AMLODIPINE BESYLATE 10 MG: 10 TABLET ORAL at 09:09

## 2021-09-29 RX ADMIN — ISOSORBIDE MONONITRATE 120 MG: 60 TABLET, EXTENDED RELEASE ORAL at 09:09

## 2021-09-29 RX ADMIN — PIPERACILLIN SODIUM AND TAZOBACTAM SODIUM 4.5 G: 4; .5 INJECTION, POWDER, FOR SOLUTION INTRAVENOUS at 08:09

## 2021-09-29 RX ADMIN — Medication 400 MG: at 09:09

## 2021-09-29 RX ADMIN — HEPARIN SODIUM 7500 UNITS: 5000 INJECTION INTRAVENOUS; SUBCUTANEOUS at 02:09

## 2021-09-29 RX ADMIN — INSULIN DETEMIR 10 UNITS: 100 INJECTION, SOLUTION SUBCUTANEOUS at 12:09

## 2021-09-29 RX ADMIN — PRAVASTATIN SODIUM 80 MG: 40 TABLET ORAL at 12:09

## 2021-09-29 RX ADMIN — CLONIDINE HYDROCHLORIDE 0.2 MG: 0.1 TABLET ORAL at 02:09

## 2021-09-29 RX ADMIN — CLONIDINE HYDROCHLORIDE 0.2 MG: 0.1 TABLET ORAL at 10:09

## 2021-09-29 RX ADMIN — HYDRALAZINE HYDROCHLORIDE 5 MG: 20 INJECTION, SOLUTION INTRAMUSCULAR; INTRAVENOUS at 03:09

## 2021-09-29 RX ADMIN — MICONAZOLE NITRATE: 20 POWDER TOPICAL at 09:09

## 2021-09-29 RX ADMIN — MICONAZOLE NITRATE: 20 POWDER TOPICAL at 12:09

## 2021-09-29 RX ADMIN — PROPRANOLOL HYDROCHLORIDE 80 MG: 40 TABLET ORAL at 12:09

## 2021-09-29 RX ADMIN — SERTRALINE HYDROCHLORIDE 25 MG: 25 TABLET ORAL at 09:09

## 2021-09-29 RX ADMIN — VANCOMYCIN HYDROCHLORIDE 1000 MG: 1 INJECTION, POWDER, LYOPHILIZED, FOR SOLUTION INTRAVENOUS at 09:09

## 2021-09-30 ENCOUNTER — TELEPHONE (OUTPATIENT)
Dept: OPTOMETRY | Facility: CLINIC | Age: 71
End: 2021-09-30

## 2021-09-30 PROBLEM — R78.81 BACTEREMIA DUE TO STAPHYLOCOCCUS: Status: ACTIVE | Noted: 2021-09-28

## 2021-09-30 PROBLEM — G93.41 SEPTIC ENCEPHALOPATHY: Status: ACTIVE | Noted: 2021-09-28

## 2021-09-30 PROBLEM — B95.8 BACTEREMIA DUE TO STAPHYLOCOCCUS: Status: ACTIVE | Noted: 2021-09-28

## 2021-09-30 LAB
ANION GAP SERPL CALC-SCNC: 11 MMOL/L (ref 8–16)
BASOPHILS # BLD AUTO: 0.03 K/UL (ref 0–0.2)
BASOPHILS NFR BLD: 0.3 % (ref 0–1.9)
BUN SERPL-MCNC: 43 MG/DL (ref 8–23)
CALCIUM SERPL-MCNC: 8.1 MG/DL (ref 8.7–10.5)
CHLORIDE SERPL-SCNC: 105 MMOL/L (ref 95–110)
CO2 SERPL-SCNC: 21 MMOL/L (ref 23–29)
CREAT SERPL-MCNC: 2.5 MG/DL (ref 0.5–1.4)
DIFFERENTIAL METHOD: ABNORMAL
EOSINOPHIL # BLD AUTO: 0.2 K/UL (ref 0–0.5)
EOSINOPHIL NFR BLD: 2.1 % (ref 0–8)
ERYTHROCYTE [DISTWIDTH] IN BLOOD BY AUTOMATED COUNT: 13.5 % (ref 11.5–14.5)
EST. GFR  (AFRICAN AMERICAN): 21.9 ML/MIN/1.73 M^2
EST. GFR  (NON AFRICAN AMERICAN): 19 ML/MIN/1.73 M^2
GLUCOSE SERPL-MCNC: 222 MG/DL (ref 70–110)
HCT VFR BLD AUTO: 24 % (ref 37–48.5)
HGB BLD-MCNC: 7.6 G/DL (ref 12–16)
IMM GRANULOCYTES # BLD AUTO: 0.12 K/UL (ref 0–0.04)
IMM GRANULOCYTES NFR BLD AUTO: 1.2 % (ref 0–0.5)
LYMPHOCYTES # BLD AUTO: 2.9 K/UL (ref 1–4.8)
LYMPHOCYTES NFR BLD: 29.9 % (ref 18–48)
MAGNESIUM SERPL-MCNC: 2 MG/DL (ref 1.6–2.6)
MCH RBC QN AUTO: 27.7 PG (ref 27–31)
MCHC RBC AUTO-ENTMCNC: 31.7 G/DL (ref 32–36)
MCV RBC AUTO: 88 FL (ref 82–98)
MONOCYTES # BLD AUTO: 1.1 K/UL (ref 0.3–1)
MONOCYTES NFR BLD: 11.1 % (ref 4–15)
NEUTROPHILS # BLD AUTO: 5.4 K/UL (ref 1.8–7.7)
NEUTROPHILS NFR BLD: 55.4 % (ref 38–73)
NRBC BLD-RTO: 0 /100 WBC
PHOSPHATE SERPL-MCNC: 3.3 MG/DL (ref 2.7–4.5)
PLATELET # BLD AUTO: 328 K/UL (ref 150–450)
PMV BLD AUTO: 8.8 FL (ref 9.2–12.9)
POCT GLUCOSE: 272 MG/DL (ref 70–110)
POCT GLUCOSE: 356 MG/DL (ref 70–110)
POCT GLUCOSE: 373 MG/DL (ref 70–110)
POCT GLUCOSE: 445 MG/DL (ref 70–110)
POTASSIUM SERPL-SCNC: 3.2 MMOL/L (ref 3.5–5.1)
RBC # BLD AUTO: 2.74 M/UL (ref 4–5.4)
SODIUM SERPL-SCNC: 137 MMOL/L (ref 136–145)
VANCOMYCIN SERPL-MCNC: 21 UG/ML
WBC # BLD AUTO: 9.83 K/UL (ref 3.9–12.7)

## 2021-09-30 PROCEDURE — 80202 ASSAY OF VANCOMYCIN: CPT | Performed by: STUDENT IN AN ORGANIZED HEALTH CARE EDUCATION/TRAINING PROGRAM

## 2021-09-30 PROCEDURE — 83735 ASSAY OF MAGNESIUM: CPT | Performed by: HOSPITALIST

## 2021-09-30 PROCEDURE — 36415 COLL VENOUS BLD VENIPUNCTURE: CPT | Performed by: STUDENT IN AN ORGANIZED HEALTH CARE EDUCATION/TRAINING PROGRAM

## 2021-09-30 PROCEDURE — 63600175 PHARM REV CODE 636 W HCPCS: Performed by: HOSPITALIST

## 2021-09-30 PROCEDURE — 99232 PR SUBSEQUENT HOSPITAL CARE,LEVL II: ICD-10-PCS | Mod: ,,, | Performed by: HOSPITALIST

## 2021-09-30 PROCEDURE — 85025 COMPLETE CBC W/AUTO DIFF WBC: CPT | Performed by: HOSPITALIST

## 2021-09-30 PROCEDURE — 25000003 PHARM REV CODE 250: Performed by: STUDENT IN AN ORGANIZED HEALTH CARE EDUCATION/TRAINING PROGRAM

## 2021-09-30 PROCEDURE — 80048 BASIC METABOLIC PNL TOTAL CA: CPT | Performed by: HOSPITALIST

## 2021-09-30 PROCEDURE — 99232 SBSQ HOSP IP/OBS MODERATE 35: CPT | Mod: ,,, | Performed by: HOSPITALIST

## 2021-09-30 PROCEDURE — 25000003 PHARM REV CODE 250: Performed by: HOSPITALIST

## 2021-09-30 PROCEDURE — 84100 ASSAY OF PHOSPHORUS: CPT | Performed by: HOSPITALIST

## 2021-09-30 PROCEDURE — 36415 COLL VENOUS BLD VENIPUNCTURE: CPT | Performed by: HOSPITALIST

## 2021-09-30 PROCEDURE — 20600001 HC STEP DOWN PRIVATE ROOM

## 2021-09-30 RX ORDER — METHOCARBAMOL 750 MG/1
750 TABLET, FILM COATED ORAL 3 TIMES DAILY PRN
Status: DISCONTINUED | OUTPATIENT
Start: 2021-09-30 | End: 2021-10-03 | Stop reason: HOSPADM

## 2021-09-30 RX ORDER — ONDANSETRON 4 MG/1
4 TABLET, FILM COATED ORAL EVERY 6 HOURS PRN
Status: DISCONTINUED | OUTPATIENT
Start: 2021-09-30 | End: 2021-10-03 | Stop reason: HOSPADM

## 2021-09-30 RX ORDER — BISACODYL 5 MG
5 TABLET, DELAYED RELEASE (ENTERIC COATED) ORAL DAILY PRN
Status: DISCONTINUED | OUTPATIENT
Start: 2021-09-30 | End: 2021-10-03 | Stop reason: HOSPADM

## 2021-09-30 RX ORDER — INSULIN ASPART 100 [IU]/ML
10 INJECTION, SOLUTION INTRAVENOUS; SUBCUTANEOUS
Status: DISCONTINUED | OUTPATIENT
Start: 2021-09-30 | End: 2021-10-03 | Stop reason: HOSPADM

## 2021-09-30 RX ADMIN — HEPARIN SODIUM 7500 UNITS: 5000 INJECTION INTRAVENOUS; SUBCUTANEOUS at 09:09

## 2021-09-30 RX ADMIN — INSULIN ASPART 5 UNITS: 100 INJECTION, SOLUTION INTRAVENOUS; SUBCUTANEOUS at 04:09

## 2021-09-30 RX ADMIN — PRAVASTATIN SODIUM 80 MG: 40 TABLET ORAL at 11:09

## 2021-09-30 RX ADMIN — CLONIDINE HYDROCHLORIDE 0.2 MG: 0.1 TABLET ORAL at 11:09

## 2021-09-30 RX ADMIN — ANASTROZOLE 1 MG: 1 TABLET, COATED ORAL at 11:09

## 2021-09-30 RX ADMIN — PROPRANOLOL HYDROCHLORIDE 80 MG: 40 TABLET ORAL at 12:09

## 2021-09-30 RX ADMIN — MICONAZOLE NITRATE: 20 POWDER TOPICAL at 09:09

## 2021-09-30 RX ADMIN — INSULIN ASPART 10 UNITS: 100 INJECTION, SOLUTION INTRAVENOUS; SUBCUTANEOUS at 04:09

## 2021-09-30 RX ADMIN — SODIUM BICARBONATE 1300 MG: 650 TABLET ORAL at 11:09

## 2021-09-30 RX ADMIN — AMLODIPINE BESYLATE 10 MG: 10 TABLET ORAL at 11:09

## 2021-09-30 RX ADMIN — ISOSORBIDE MONONITRATE 120 MG: 60 TABLET, EXTENDED RELEASE ORAL at 11:09

## 2021-09-30 RX ADMIN — LEVOTHYROXINE SODIUM 50 MCG: 50 TABLET ORAL at 05:09

## 2021-09-30 RX ADMIN — ACETAMINOPHEN 1000 MG: 500 TABLET ORAL at 09:09

## 2021-09-30 RX ADMIN — VANCOMYCIN HYDROCHLORIDE 750 MG: 750 INJECTION, POWDER, LYOPHILIZED, FOR SOLUTION INTRAVENOUS at 09:09

## 2021-09-30 RX ADMIN — CLONIDINE HYDROCHLORIDE 0.2 MG: 0.1 TABLET ORAL at 08:09

## 2021-09-30 RX ADMIN — INSULIN ASPART 10 UNITS: 100 INJECTION, SOLUTION INTRAVENOUS; SUBCUTANEOUS at 12:09

## 2021-09-30 RX ADMIN — SERTRALINE HYDROCHLORIDE 25 MG: 25 TABLET ORAL at 11:09

## 2021-09-30 RX ADMIN — PIPERACILLIN SODIUM AND TAZOBACTAM SODIUM 4.5 G: 4; .5 INJECTION, POWDER, FOR SOLUTION INTRAVENOUS at 11:09

## 2021-09-30 RX ADMIN — Medication 400 MG: at 08:09

## 2021-09-30 RX ADMIN — MICONAZOLE NITRATE: 20 POWDER TOPICAL at 11:09

## 2021-09-30 RX ADMIN — HEPARIN SODIUM 7500 UNITS: 5000 INJECTION INTRAVENOUS; SUBCUTANEOUS at 03:09

## 2021-09-30 RX ADMIN — INSULIN ASPART 5 UNITS: 100 INJECTION, SOLUTION INTRAVENOUS; SUBCUTANEOUS at 12:09

## 2021-09-30 RX ADMIN — INSULIN ASPART 1 UNITS: 100 INJECTION, SOLUTION INTRAVENOUS; SUBCUTANEOUS at 09:09

## 2021-09-30 RX ADMIN — SODIUM BICARBONATE 1300 MG: 650 TABLET ORAL at 04:09

## 2021-09-30 RX ADMIN — HEPARIN SODIUM 7500 UNITS: 5000 INJECTION INTRAVENOUS; SUBCUTANEOUS at 05:09

## 2021-09-30 RX ADMIN — CLONIDINE HYDROCHLORIDE 0.2 MG: 0.1 TABLET ORAL at 03:09

## 2021-09-30 RX ADMIN — SODIUM BICARBONATE 1300 MG: 650 TABLET ORAL at 08:09

## 2021-10-01 PROBLEM — N39.0 SEPSIS SECONDARY TO UTI: Status: RESOLVED | Noted: 2021-09-28 | Resolved: 2021-10-01

## 2021-10-01 PROBLEM — A41.9 SEPSIS SECONDARY TO UTI: Status: RESOLVED | Noted: 2021-09-28 | Resolved: 2021-10-01

## 2021-10-01 PROBLEM — D64.9 NORMOCYTIC ANEMIA: Status: ACTIVE | Noted: 2021-09-30

## 2021-10-01 PROBLEM — G93.41 SEPTIC ENCEPHALOPATHY: Status: RESOLVED | Noted: 2021-09-28 | Resolved: 2021-10-01

## 2021-10-01 LAB
ANION GAP SERPL CALC-SCNC: 12 MMOL/L (ref 8–16)
BACTERIA UR CULT: ABNORMAL
BASOPHILS # BLD AUTO: 0.04 K/UL (ref 0–0.2)
BASOPHILS NFR BLD: 0.5 % (ref 0–1.9)
BUN SERPL-MCNC: 42 MG/DL (ref 8–23)
CALCIUM SERPL-MCNC: 7.6 MG/DL (ref 8.7–10.5)
CHLORIDE SERPL-SCNC: 100 MMOL/L (ref 95–110)
CO2 SERPL-SCNC: 19 MMOL/L (ref 23–29)
CREAT SERPL-MCNC: 2.5 MG/DL (ref 0.5–1.4)
DIFFERENTIAL METHOD: ABNORMAL
EOSINOPHIL # BLD AUTO: 0.3 K/UL (ref 0–0.5)
EOSINOPHIL NFR BLD: 3.3 % (ref 0–8)
ERYTHROCYTE [DISTWIDTH] IN BLOOD BY AUTOMATED COUNT: 13.3 % (ref 11.5–14.5)
EST. GFR  (AFRICAN AMERICAN): 21.9 ML/MIN/1.73 M^2
EST. GFR  (NON AFRICAN AMERICAN): 19 ML/MIN/1.73 M^2
FERRITIN SERPL-MCNC: 172 NG/ML (ref 20–300)
FOLATE SERPL-MCNC: 4.2 NG/ML (ref 4–24)
GLUCOSE SERPL-MCNC: 186 MG/DL (ref 70–110)
HCT VFR BLD AUTO: 22.4 % (ref 37–48.5)
HGB BLD-MCNC: 7.1 G/DL (ref 12–16)
IMM GRANULOCYTES # BLD AUTO: 0.1 K/UL (ref 0–0.04)
IMM GRANULOCYTES NFR BLD AUTO: 1.2 % (ref 0–0.5)
IRON SERPL-MCNC: 67 UG/DL (ref 30–160)
LYMPHOCYTES # BLD AUTO: 3.1 K/UL (ref 1–4.8)
LYMPHOCYTES NFR BLD: 38 % (ref 18–48)
MAGNESIUM SERPL-MCNC: 1.9 MG/DL (ref 1.6–2.6)
MCH RBC QN AUTO: 27.6 PG (ref 27–31)
MCHC RBC AUTO-ENTMCNC: 31.7 G/DL (ref 32–36)
MCV RBC AUTO: 87 FL (ref 82–98)
MONOCYTES # BLD AUTO: 0.9 K/UL (ref 0.3–1)
MONOCYTES NFR BLD: 10.6 % (ref 4–15)
NEUTROPHILS # BLD AUTO: 3.8 K/UL (ref 1.8–7.7)
NEUTROPHILS NFR BLD: 46.4 % (ref 38–73)
NRBC BLD-RTO: 0 /100 WBC
PHOSPHATE SERPL-MCNC: 3.3 MG/DL (ref 2.7–4.5)
PLATELET # BLD AUTO: 281 K/UL (ref 150–450)
PMV BLD AUTO: 9.1 FL (ref 9.2–12.9)
POCT GLUCOSE: 192 MG/DL (ref 70–110)
POCT GLUCOSE: 208 MG/DL (ref 70–110)
POCT GLUCOSE: 236 MG/DL (ref 70–110)
POCT GLUCOSE: 271 MG/DL (ref 70–110)
POTASSIUM SERPL-SCNC: 3.3 MMOL/L (ref 3.5–5.1)
RBC # BLD AUTO: 2.57 M/UL (ref 4–5.4)
SATURATED IRON: 43 % (ref 20–50)
SODIUM SERPL-SCNC: 131 MMOL/L (ref 136–145)
TOTAL IRON BINDING CAPACITY: 157 UG/DL (ref 250–450)
TRANSFERRIN SERPL-MCNC: 106 MG/DL (ref 200–375)
VIT B12 SERPL-MCNC: 488 PG/ML (ref 210–950)
WBC # BLD AUTO: 8.1 K/UL (ref 3.9–12.7)

## 2021-10-01 PROCEDURE — 25000003 PHARM REV CODE 250: Performed by: HOSPITALIST

## 2021-10-01 PROCEDURE — 20600001 HC STEP DOWN PRIVATE ROOM

## 2021-10-01 PROCEDURE — 83735 ASSAY OF MAGNESIUM: CPT | Performed by: HOSPITALIST

## 2021-10-01 PROCEDURE — 84466 ASSAY OF TRANSFERRIN: CPT | Performed by: HOSPITALIST

## 2021-10-01 PROCEDURE — 84100 ASSAY OF PHOSPHORUS: CPT | Performed by: HOSPITALIST

## 2021-10-01 PROCEDURE — 36415 COLL VENOUS BLD VENIPUNCTURE: CPT | Performed by: HOSPITALIST

## 2021-10-01 PROCEDURE — 85025 COMPLETE CBC W/AUTO DIFF WBC: CPT | Performed by: HOSPITALIST

## 2021-10-01 PROCEDURE — 99223 PR INITIAL HOSPITAL CARE,LEVL III: ICD-10-PCS | Mod: ,,, | Performed by: INTERNAL MEDICINE

## 2021-10-01 PROCEDURE — 82607 VITAMIN B-12: CPT | Performed by: HOSPITALIST

## 2021-10-01 PROCEDURE — 80048 BASIC METABOLIC PNL TOTAL CA: CPT | Performed by: HOSPITALIST

## 2021-10-01 PROCEDURE — 99223 1ST HOSP IP/OBS HIGH 75: CPT | Mod: ,,, | Performed by: INTERNAL MEDICINE

## 2021-10-01 PROCEDURE — 82746 ASSAY OF FOLIC ACID SERUM: CPT | Performed by: HOSPITALIST

## 2021-10-01 PROCEDURE — 25000003 PHARM REV CODE 250: Performed by: STUDENT IN AN ORGANIZED HEALTH CARE EDUCATION/TRAINING PROGRAM

## 2021-10-01 PROCEDURE — 99232 PR SUBSEQUENT HOSPITAL CARE,LEVL II: ICD-10-PCS | Mod: ,,, | Performed by: HOSPITALIST

## 2021-10-01 PROCEDURE — 25000003 PHARM REV CODE 250: Performed by: PHYSICIAN ASSISTANT

## 2021-10-01 PROCEDURE — 63600175 PHARM REV CODE 636 W HCPCS: Performed by: HOSPITALIST

## 2021-10-01 PROCEDURE — 99232 SBSQ HOSP IP/OBS MODERATE 35: CPT | Mod: ,,, | Performed by: HOSPITALIST

## 2021-10-01 PROCEDURE — 82728 ASSAY OF FERRITIN: CPT | Performed by: HOSPITALIST

## 2021-10-01 RX ORDER — SUMATRIPTAN 50 MG/1
100 TABLET, FILM COATED ORAL 2 TIMES DAILY PRN
Status: DISCONTINUED | OUTPATIENT
Start: 2021-10-01 | End: 2021-10-03 | Stop reason: HOSPADM

## 2021-10-01 RX ORDER — AMOXICILLIN 500 MG/1
500 CAPSULE ORAL 3 TIMES DAILY
Status: DISCONTINUED | OUTPATIENT
Start: 2021-10-01 | End: 2021-10-02

## 2021-10-01 RX ADMIN — CLONIDINE HYDROCHLORIDE 0.2 MG: 0.1 TABLET ORAL at 08:10

## 2021-10-01 RX ADMIN — SUMATRIPTAN SUCCINATE 100 MG: 50 TABLET ORAL at 12:10

## 2021-10-01 RX ADMIN — LEVOTHYROXINE SODIUM 50 MCG: 50 TABLET ORAL at 05:10

## 2021-10-01 RX ADMIN — INSULIN ASPART 3 UNITS: 100 INJECTION, SOLUTION INTRAVENOUS; SUBCUTANEOUS at 01:10

## 2021-10-01 RX ADMIN — SUMATRIPTAN SUCCINATE 100 MG: 50 TABLET ORAL at 10:10

## 2021-10-01 RX ADMIN — HEPARIN SODIUM 7500 UNITS: 5000 INJECTION INTRAVENOUS; SUBCUTANEOUS at 08:10

## 2021-10-01 RX ADMIN — INSULIN ASPART 10 UNITS: 100 INJECTION, SOLUTION INTRAVENOUS; SUBCUTANEOUS at 08:10

## 2021-10-01 RX ADMIN — HEPARIN SODIUM 7500 UNITS: 5000 INJECTION INTRAVENOUS; SUBCUTANEOUS at 05:10

## 2021-10-01 RX ADMIN — INSULIN ASPART 10 UNITS: 100 INJECTION, SOLUTION INTRAVENOUS; SUBCUTANEOUS at 01:10

## 2021-10-01 RX ADMIN — ISOSORBIDE MONONITRATE 120 MG: 60 TABLET, EXTENDED RELEASE ORAL at 08:10

## 2021-10-01 RX ADMIN — PRAVASTATIN SODIUM 80 MG: 40 TABLET ORAL at 08:10

## 2021-10-01 RX ADMIN — ANASTROZOLE 1 MG: 1 TABLET, COATED ORAL at 08:10

## 2021-10-01 RX ADMIN — AMOXICILLIN 500 MG: 500 CAPSULE ORAL at 08:10

## 2021-10-01 RX ADMIN — MICONAZOLE NITRATE: 20 POWDER TOPICAL at 08:10

## 2021-10-01 RX ADMIN — Medication 400 MG: at 08:10

## 2021-10-01 RX ADMIN — SODIUM BICARBONATE 1300 MG: 650 TABLET ORAL at 03:10

## 2021-10-01 RX ADMIN — INSULIN ASPART 10 UNITS: 100 INJECTION, SOLUTION INTRAVENOUS; SUBCUTANEOUS at 05:10

## 2021-10-01 RX ADMIN — HEPARIN SODIUM 7500 UNITS: 5000 INJECTION INTRAVENOUS; SUBCUTANEOUS at 03:10

## 2021-10-01 RX ADMIN — AMLODIPINE BESYLATE 10 MG: 10 TABLET ORAL at 08:10

## 2021-10-01 RX ADMIN — SODIUM BICARBONATE 1300 MG: 650 TABLET ORAL at 08:10

## 2021-10-01 RX ADMIN — SERTRALINE HYDROCHLORIDE 25 MG: 25 TABLET ORAL at 08:10

## 2021-10-01 RX ADMIN — PROPRANOLOL HYDROCHLORIDE 80 MG: 40 TABLET ORAL at 08:10

## 2021-10-01 RX ADMIN — INSULIN ASPART 2 UNITS: 100 INJECTION, SOLUTION INTRAVENOUS; SUBCUTANEOUS at 08:10

## 2021-10-01 RX ADMIN — CLONIDINE HYDROCHLORIDE 0.2 MG: 0.1 TABLET ORAL at 03:10

## 2021-10-01 RX ADMIN — ACETAMINOPHEN 1000 MG: 500 TABLET ORAL at 05:10

## 2021-10-01 RX ADMIN — Medication 6 MG: at 08:10

## 2021-10-02 LAB
ANION GAP SERPL CALC-SCNC: 9 MMOL/L (ref 8–16)
BACTERIA BLD CULT: ABNORMAL
BASOPHILS # BLD AUTO: 0.05 K/UL (ref 0–0.2)
BASOPHILS NFR BLD: 0.6 % (ref 0–1.9)
BUN SERPL-MCNC: 34 MG/DL (ref 8–23)
CALCIUM SERPL-MCNC: 8.3 MG/DL (ref 8.7–10.5)
CHLORIDE SERPL-SCNC: 103 MMOL/L (ref 95–110)
CO2 SERPL-SCNC: 22 MMOL/L (ref 23–29)
CREAT SERPL-MCNC: 2.4 MG/DL (ref 0.5–1.4)
DIFFERENTIAL METHOD: ABNORMAL
EOSINOPHIL # BLD AUTO: 0.3 K/UL (ref 0–0.5)
EOSINOPHIL NFR BLD: 3.3 % (ref 0–8)
ERYTHROCYTE [DISTWIDTH] IN BLOOD BY AUTOMATED COUNT: 13.2 % (ref 11.5–14.5)
EST. GFR  (AFRICAN AMERICAN): 23 ML/MIN/1.73 M^2
EST. GFR  (NON AFRICAN AMERICAN): 20 ML/MIN/1.73 M^2
GLUCOSE SERPL-MCNC: 230 MG/DL (ref 70–110)
HCT VFR BLD AUTO: 25.1 % (ref 37–48.5)
HGB BLD-MCNC: 7.7 G/DL (ref 12–16)
IMM GRANULOCYTES # BLD AUTO: 0.16 K/UL (ref 0–0.04)
IMM GRANULOCYTES NFR BLD AUTO: 2 % (ref 0–0.5)
LYMPHOCYTES # BLD AUTO: 2.6 K/UL (ref 1–4.8)
LYMPHOCYTES NFR BLD: 32.7 % (ref 18–48)
MAGNESIUM SERPL-MCNC: 2 MG/DL (ref 1.6–2.6)
MCH RBC QN AUTO: 27.2 PG (ref 27–31)
MCHC RBC AUTO-ENTMCNC: 30.7 G/DL (ref 32–36)
MCV RBC AUTO: 89 FL (ref 82–98)
MONOCYTES # BLD AUTO: 0.7 K/UL (ref 0.3–1)
MONOCYTES NFR BLD: 8.9 % (ref 4–15)
NEUTROPHILS # BLD AUTO: 4.2 K/UL (ref 1.8–7.7)
NEUTROPHILS NFR BLD: 52.5 % (ref 38–73)
NRBC BLD-RTO: 0 /100 WBC
PHOSPHATE SERPL-MCNC: 4.1 MG/DL (ref 2.7–4.5)
PLATELET # BLD AUTO: 278 K/UL (ref 150–450)
PMV BLD AUTO: 9.1 FL (ref 9.2–12.9)
POCT GLUCOSE: 205 MG/DL (ref 70–110)
POCT GLUCOSE: 227 MG/DL (ref 70–110)
POCT GLUCOSE: 269 MG/DL (ref 70–110)
POCT GLUCOSE: 280 MG/DL (ref 70–110)
POTASSIUM SERPL-SCNC: 3.9 MMOL/L (ref 3.5–5.1)
RBC # BLD AUTO: 2.83 M/UL (ref 4–5.4)
SODIUM SERPL-SCNC: 134 MMOL/L (ref 136–145)
WBC # BLD AUTO: 7.98 K/UL (ref 3.9–12.7)

## 2021-10-02 PROCEDURE — 84100 ASSAY OF PHOSPHORUS: CPT | Performed by: HOSPITALIST

## 2021-10-02 PROCEDURE — 25000003 PHARM REV CODE 250: Performed by: HOSPITALIST

## 2021-10-02 PROCEDURE — 25000003 PHARM REV CODE 250: Performed by: STUDENT IN AN ORGANIZED HEALTH CARE EDUCATION/TRAINING PROGRAM

## 2021-10-02 PROCEDURE — 83735 ASSAY OF MAGNESIUM: CPT | Performed by: HOSPITALIST

## 2021-10-02 PROCEDURE — 63600175 PHARM REV CODE 636 W HCPCS: Performed by: INTERNAL MEDICINE

## 2021-10-02 PROCEDURE — 63600175 PHARM REV CODE 636 W HCPCS: Performed by: HOSPITALIST

## 2021-10-02 PROCEDURE — 85025 COMPLETE CBC W/AUTO DIFF WBC: CPT | Performed by: HOSPITALIST

## 2021-10-02 PROCEDURE — 20600001 HC STEP DOWN PRIVATE ROOM

## 2021-10-02 PROCEDURE — 80048 BASIC METABOLIC PNL TOTAL CA: CPT | Performed by: HOSPITALIST

## 2021-10-02 PROCEDURE — 99232 PR SUBSEQUENT HOSPITAL CARE,LEVL II: ICD-10-PCS | Mod: ,,, | Performed by: HOSPITALIST

## 2021-10-02 PROCEDURE — 36415 COLL VENOUS BLD VENIPUNCTURE: CPT | Performed by: HOSPITALIST

## 2021-10-02 PROCEDURE — 99232 SBSQ HOSP IP/OBS MODERATE 35: CPT | Mod: ,,, | Performed by: HOSPITALIST

## 2021-10-02 RX ORDER — AMOXICILLIN 500 MG/1
500 CAPSULE ORAL EVERY 12 HOURS
Status: DISCONTINUED | OUTPATIENT
Start: 2021-10-02 | End: 2021-10-03 | Stop reason: HOSPADM

## 2021-10-02 RX ORDER — NIFEDIPINE 30 MG/1
90 TABLET, EXTENDED RELEASE ORAL DAILY
Status: DISCONTINUED | OUTPATIENT
Start: 2021-10-03 | End: 2021-10-03 | Stop reason: HOSPADM

## 2021-10-02 RX ORDER — CARVEDILOL 12.5 MG/1
12.5 TABLET ORAL 2 TIMES DAILY
Status: DISCONTINUED | OUTPATIENT
Start: 2021-10-02 | End: 2021-10-03 | Stop reason: HOSPADM

## 2021-10-02 RX ADMIN — CLONIDINE HYDROCHLORIDE 0.2 MG: 0.1 TABLET ORAL at 08:10

## 2021-10-02 RX ADMIN — HEPARIN SODIUM 7500 UNITS: 5000 INJECTION INTRAVENOUS; SUBCUTANEOUS at 04:10

## 2021-10-02 RX ADMIN — INSULIN ASPART 10 UNITS: 100 INJECTION, SOLUTION INTRAVENOUS; SUBCUTANEOUS at 12:10

## 2021-10-02 RX ADMIN — MICONAZOLE NITRATE: 20 POWDER TOPICAL at 08:10

## 2021-10-02 RX ADMIN — ISOSORBIDE MONONITRATE 120 MG: 60 TABLET, EXTENDED RELEASE ORAL at 08:10

## 2021-10-02 RX ADMIN — ACETAMINOPHEN 1000 MG: 500 TABLET ORAL at 02:10

## 2021-10-02 RX ADMIN — CARVEDILOL 12.5 MG: 12.5 TABLET, FILM COATED ORAL at 08:10

## 2021-10-02 RX ADMIN — INSULIN ASPART 1 UNITS: 100 INJECTION, SOLUTION INTRAVENOUS; SUBCUTANEOUS at 08:10

## 2021-10-02 RX ADMIN — INSULIN ASPART 10 UNITS: 100 INJECTION, SOLUTION INTRAVENOUS; SUBCUTANEOUS at 04:10

## 2021-10-02 RX ADMIN — HYDRALAZINE HYDROCHLORIDE 5 MG: 20 INJECTION, SOLUTION INTRAMUSCULAR; INTRAVENOUS at 09:10

## 2021-10-02 RX ADMIN — HEPARIN SODIUM 7500 UNITS: 5000 INJECTION INTRAVENOUS; SUBCUTANEOUS at 08:10

## 2021-10-02 RX ADMIN — SODIUM BICARBONATE 1300 MG: 650 TABLET ORAL at 08:10

## 2021-10-02 RX ADMIN — Medication 400 MG: at 08:10

## 2021-10-02 RX ADMIN — ACETAMINOPHEN 1000 MG: 500 TABLET ORAL at 05:10

## 2021-10-02 RX ADMIN — SERTRALINE HYDROCHLORIDE 25 MG: 25 TABLET ORAL at 08:10

## 2021-10-02 RX ADMIN — INSULIN ASPART 3 UNITS: 100 INJECTION, SOLUTION INTRAVENOUS; SUBCUTANEOUS at 12:10

## 2021-10-02 RX ADMIN — LEVOTHYROXINE SODIUM 50 MCG: 50 TABLET ORAL at 04:10

## 2021-10-02 RX ADMIN — CARVEDILOL 12.5 MG: 12.5 TABLET, FILM COATED ORAL at 12:10

## 2021-10-02 RX ADMIN — AMLODIPINE BESYLATE 10 MG: 10 TABLET ORAL at 08:10

## 2021-10-02 RX ADMIN — SODIUM BICARBONATE 1300 MG: 650 TABLET ORAL at 04:10

## 2021-10-02 RX ADMIN — SUMATRIPTAN SUCCINATE 100 MG: 50 TABLET ORAL at 11:10

## 2021-10-02 RX ADMIN — PROPRANOLOL HYDROCHLORIDE 80 MG: 40 TABLET ORAL at 08:10

## 2021-10-02 RX ADMIN — CLONIDINE HYDROCHLORIDE 0.2 MG: 0.1 TABLET ORAL at 04:10

## 2021-10-02 RX ADMIN — INSULIN ASPART 2 UNITS: 100 INJECTION, SOLUTION INTRAVENOUS; SUBCUTANEOUS at 08:10

## 2021-10-02 RX ADMIN — AMOXICILLIN 500 MG: 500 CAPSULE ORAL at 09:10

## 2021-10-02 RX ADMIN — INSULIN ASPART 3 UNITS: 100 INJECTION, SOLUTION INTRAVENOUS; SUBCUTANEOUS at 04:10

## 2021-10-02 RX ADMIN — AMOXICILLIN 500 MG: 500 CAPSULE ORAL at 08:10

## 2021-10-02 RX ADMIN — PRAVASTATIN SODIUM 80 MG: 40 TABLET ORAL at 08:10

## 2021-10-02 RX ADMIN — ANASTROZOLE 1 MG: 1 TABLET, COATED ORAL at 08:10

## 2021-10-02 RX ADMIN — INSULIN ASPART 10 UNITS: 100 INJECTION, SOLUTION INTRAVENOUS; SUBCUTANEOUS at 08:10

## 2021-10-03 VITALS
HEIGHT: 65 IN | BODY MASS INDEX: 45.18 KG/M2 | RESPIRATION RATE: 18 BRPM | WEIGHT: 271.19 LBS | DIASTOLIC BLOOD PRESSURE: 64 MMHG | TEMPERATURE: 98 F | OXYGEN SATURATION: 96 % | SYSTOLIC BLOOD PRESSURE: 155 MMHG | HEART RATE: 70 BPM

## 2021-10-03 PROBLEM — R78.81 POSITIVE BLOOD CULTURE: Status: RESOLVED | Noted: 2021-09-28 | Resolved: 2021-10-03

## 2021-10-03 LAB
ANION GAP SERPL CALC-SCNC: 11 MMOL/L (ref 8–16)
BACTERIA BLD CULT: ABNORMAL
BASOPHILS # BLD AUTO: 0.06 K/UL (ref 0–0.2)
BASOPHILS NFR BLD: 0.7 % (ref 0–1.9)
BUN SERPL-MCNC: 36 MG/DL (ref 8–23)
CALCIUM SERPL-MCNC: 8.6 MG/DL (ref 8.7–10.5)
CHLORIDE SERPL-SCNC: 102 MMOL/L (ref 95–110)
CO2 SERPL-SCNC: 24 MMOL/L (ref 23–29)
CREAT SERPL-MCNC: 2.2 MG/DL (ref 0.5–1.4)
DIFFERENTIAL METHOD: ABNORMAL
EOSINOPHIL # BLD AUTO: 0.4 K/UL (ref 0–0.5)
EOSINOPHIL NFR BLD: 4.5 % (ref 0–8)
ERYTHROCYTE [DISTWIDTH] IN BLOOD BY AUTOMATED COUNT: 13.4 % (ref 11.5–14.5)
EST. GFR  (AFRICAN AMERICAN): 25.6 ML/MIN/1.73 M^2
EST. GFR  (NON AFRICAN AMERICAN): 22.2 ML/MIN/1.73 M^2
GLUCOSE SERPL-MCNC: 148 MG/DL (ref 70–110)
HCT VFR BLD AUTO: 27 % (ref 37–48.5)
HGB BLD-MCNC: 8.6 G/DL (ref 12–16)
IMM GRANULOCYTES # BLD AUTO: 0.23 K/UL (ref 0–0.04)
IMM GRANULOCYTES NFR BLD AUTO: 2.6 % (ref 0–0.5)
LYMPHOCYTES # BLD AUTO: 3.1 K/UL (ref 1–4.8)
LYMPHOCYTES NFR BLD: 34.4 % (ref 18–48)
MAGNESIUM SERPL-MCNC: 2.1 MG/DL (ref 1.6–2.6)
MCH RBC QN AUTO: 27.7 PG (ref 27–31)
MCHC RBC AUTO-ENTMCNC: 31.9 G/DL (ref 32–36)
MCV RBC AUTO: 87 FL (ref 82–98)
MONOCYTES # BLD AUTO: 0.9 K/UL (ref 0.3–1)
MONOCYTES NFR BLD: 10 % (ref 4–15)
NEUTROPHILS # BLD AUTO: 4.3 K/UL (ref 1.8–7.7)
NEUTROPHILS NFR BLD: 47.8 % (ref 38–73)
NRBC BLD-RTO: 0 /100 WBC
PHOSPHATE SERPL-MCNC: 3.7 MG/DL (ref 2.7–4.5)
PLATELET # BLD AUTO: 328 K/UL (ref 150–450)
PMV BLD AUTO: 9.8 FL (ref 9.2–12.9)
POCT GLUCOSE: 249 MG/DL (ref 70–110)
POTASSIUM SERPL-SCNC: 3.5 MMOL/L (ref 3.5–5.1)
RBC # BLD AUTO: 3.1 M/UL (ref 4–5.4)
SODIUM SERPL-SCNC: 137 MMOL/L (ref 136–145)
WBC # BLD AUTO: 8.97 K/UL (ref 3.9–12.7)

## 2021-10-03 PROCEDURE — 80048 BASIC METABOLIC PNL TOTAL CA: CPT | Performed by: HOSPITALIST

## 2021-10-03 PROCEDURE — 85025 COMPLETE CBC W/AUTO DIFF WBC: CPT | Performed by: HOSPITALIST

## 2021-10-03 PROCEDURE — 99239 PR HOSPITAL DISCHARGE DAY,>30 MIN: ICD-10-PCS | Mod: ,,, | Performed by: HOSPITALIST

## 2021-10-03 PROCEDURE — 63600175 PHARM REV CODE 636 W HCPCS: Performed by: HOSPITALIST

## 2021-10-03 PROCEDURE — 63600175 PHARM REV CODE 636 W HCPCS: Performed by: INTERNAL MEDICINE

## 2021-10-03 PROCEDURE — 99239 HOSP IP/OBS DSCHRG MGMT >30: CPT | Mod: ,,, | Performed by: HOSPITALIST

## 2021-10-03 PROCEDURE — 84100 ASSAY OF PHOSPHORUS: CPT | Performed by: HOSPITALIST

## 2021-10-03 PROCEDURE — 36415 COLL VENOUS BLD VENIPUNCTURE: CPT | Performed by: HOSPITALIST

## 2021-10-03 PROCEDURE — 25000003 PHARM REV CODE 250: Performed by: STUDENT IN AN ORGANIZED HEALTH CARE EDUCATION/TRAINING PROGRAM

## 2021-10-03 PROCEDURE — 25000003 PHARM REV CODE 250: Performed by: HOSPITALIST

## 2021-10-03 PROCEDURE — 83735 ASSAY OF MAGNESIUM: CPT | Performed by: HOSPITALIST

## 2021-10-03 RX ORDER — HYDROCODONE BITARTRATE AND ACETAMINOPHEN 10; 325 MG/1; MG/1
1 TABLET ORAL EVERY 6 HOURS PRN
Status: DISCONTINUED | OUTPATIENT
Start: 2021-10-03 | End: 2021-10-03 | Stop reason: HOSPADM

## 2021-10-03 RX ORDER — AMOXICILLIN 500 MG/1
500 CAPSULE ORAL 3 TIMES DAILY
Qty: 16 CAPSULE | Refills: 0 | Status: SHIPPED | OUTPATIENT
Start: 2021-10-03 | End: 2023-02-07 | Stop reason: SDUPTHER

## 2021-10-03 RX ORDER — HYDROCODONE BITARTRATE AND ACETAMINOPHEN 10; 325 MG/1; MG/1
1 TABLET ORAL ONCE
Status: COMPLETED | OUTPATIENT
Start: 2021-10-03 | End: 2021-10-03

## 2021-10-03 RX ADMIN — HEPARIN SODIUM 7500 UNITS: 5000 INJECTION INTRAVENOUS; SUBCUTANEOUS at 05:10

## 2021-10-03 RX ADMIN — CLONIDINE HYDROCHLORIDE 0.2 MG: 0.1 TABLET ORAL at 09:10

## 2021-10-03 RX ADMIN — MICONAZOLE NITRATE: 20 POWDER TOPICAL at 10:10

## 2021-10-03 RX ADMIN — HYDROCODONE BITARTRATE AND ACETAMINOPHEN 1 TABLET: 10; 325 TABLET ORAL at 11:10

## 2021-10-03 RX ADMIN — ISOSORBIDE MONONITRATE 120 MG: 60 TABLET, EXTENDED RELEASE ORAL at 10:10

## 2021-10-03 RX ADMIN — SERTRALINE HYDROCHLORIDE 25 MG: 25 TABLET ORAL at 10:10

## 2021-10-03 RX ADMIN — Medication 400 MG: at 10:10

## 2021-10-03 RX ADMIN — LEVOTHYROXINE SODIUM 50 MCG: 50 TABLET ORAL at 05:10

## 2021-10-03 RX ADMIN — HYDROCODONE BITARTRATE AND ACETAMINOPHEN 1 TABLET: 10; 325 TABLET ORAL at 08:10

## 2021-10-03 RX ADMIN — ANASTROZOLE 1 MG: 1 TABLET, COATED ORAL at 10:10

## 2021-10-03 RX ADMIN — HYDRALAZINE HYDROCHLORIDE 5 MG: 20 INJECTION, SOLUTION INTRAMUSCULAR; INTRAVENOUS at 03:10

## 2021-10-03 RX ADMIN — NIFEDIPINE 90 MG: 30 TABLET, FILM COATED, EXTENDED RELEASE ORAL at 10:10

## 2021-10-03 RX ADMIN — PRAVASTATIN SODIUM 80 MG: 40 TABLET ORAL at 09:10

## 2021-10-03 RX ADMIN — INSULIN ASPART 10 UNITS: 100 INJECTION, SOLUTION INTRAVENOUS; SUBCUTANEOUS at 10:10

## 2021-10-03 RX ADMIN — METHOCARBAMOL 750 MG: 750 TABLET ORAL at 10:10

## 2021-10-03 RX ADMIN — SODIUM BICARBONATE 1300 MG: 650 TABLET ORAL at 09:10

## 2021-10-03 RX ADMIN — CARVEDILOL 12.5 MG: 12.5 TABLET, FILM COATED ORAL at 10:10

## 2021-10-03 RX ADMIN — AMOXICILLIN 500 MG: 500 CAPSULE ORAL at 10:10

## 2021-10-04 ENCOUNTER — TELEPHONE (OUTPATIENT)
Dept: INTERNAL MEDICINE | Facility: CLINIC | Age: 71
End: 2021-10-04

## 2021-10-04 LAB
BACTERIA BLD CULT: NORMAL
BACTERIA BLD CULT: NORMAL

## 2021-10-11 ENCOUNTER — TELEPHONE (OUTPATIENT)
Dept: INTERNAL MEDICINE | Facility: CLINIC | Age: 71
End: 2021-10-11

## 2021-10-13 ENCOUNTER — PATIENT MESSAGE (OUTPATIENT)
Dept: PHYSICAL MEDICINE AND REHAB | Facility: CLINIC | Age: 71
End: 2021-10-13
Payer: MEDICAID

## 2021-10-13 RX ORDER — HYDROCODONE BITARTRATE AND ACETAMINOPHEN 10; 325 MG/1; MG/1
1 TABLET ORAL EVERY 6 HOURS PRN
Qty: 120 TABLET | Refills: 0 | Status: SHIPPED | OUTPATIENT
Start: 2021-10-13 | End: 2021-11-12 | Stop reason: SDUPTHER

## 2021-10-23 NOTE — PROGRESS NOTES
Malignant neoplasm of left breast, estrogen receptor positive    6/28/2019 Imaging Significant Findings     Mammogram and US  Impression:  Left  Mass: Left breast mass at the upper outer anterior 2 o'clock position. Assessment: 4 - Suspicious finding. Biopsy is recommended.         Recommendation:  Biopsy is recommended. If the pathological results are benign, then six month follow up breast imaging is recommended. Findings and recommendation were discussed with the patient at the time of the procedure.          7/8/2019 Biopsy     Left breast, core needle biopsy:  - Invasive ductal carcinoma, Wayne grade 1 (T=2, N=1, M=1), 7mm in linear length  - Ductal carcinoma in-situ (DCIS), low nuclear grade, solid and cribriform types         7/8/2019 Initial Diagnosis     Malignant neoplasm of left breast, estrogen receptor positive, Invasive Ductal Carcinoma           7/8/2019 Tumor Markers     Estrogen Receptor: Positive >90%  Progesterone Receptor: Positive 0-10%  HER2: Negative  Ki67: 10-30%         8/1/2019 Surgery     Surgery: Dr Samia Fulton, [phone] performed left mastectomy with sentinel lymph node biopsy with pathology showing grade 1 invasive ductal carcinoma, 12 mm with 1 positive lymph nodes.            8/1/2019 Cancer Staged     Cancer Staging  T1c N1mi  Stage 1A per AJCC 8th ed. pathologic prognostic staging system             8/27/2019 Tumor Conference     Mammaprint  and let genomics provide next step. Could consider axillary dissection vs radiation but likely small benefit to both. Will discuss once genomic testing results.              
General

## 2021-11-12 ENCOUNTER — PATIENT MESSAGE (OUTPATIENT)
Dept: PHYSICAL MEDICINE AND REHAB | Facility: CLINIC | Age: 71
End: 2021-11-12
Payer: MEDICAID

## 2021-11-12 RX ORDER — HYDROCODONE BITARTRATE AND ACETAMINOPHEN 10; 325 MG/1; MG/1
1 TABLET ORAL EVERY 6 HOURS PRN
Qty: 120 TABLET | Refills: 0 | Status: ON HOLD | OUTPATIENT
Start: 2021-11-13 | End: 2021-12-29

## 2021-11-28 ENCOUNTER — HOSPITAL ENCOUNTER (INPATIENT)
Facility: HOSPITAL | Age: 71
LOS: 32 days | Discharge: NURSING, REHAB OR LONG-TERM CARE | DRG: 659 | End: 2021-12-30
Attending: EMERGENCY MEDICINE | Admitting: EMERGENCY MEDICINE
Payer: MEDICARE

## 2021-11-28 DIAGNOSIS — R41.82 ALTERED MENTAL STATUS, UNSPECIFIED ALTERED MENTAL STATUS TYPE: Primary | ICD-10-CM

## 2021-11-28 DIAGNOSIS — N17.9 ACUTE RENAL FAILURE: ICD-10-CM

## 2021-11-28 DIAGNOSIS — R04.0 EPISTAXIS: ICD-10-CM

## 2021-11-28 DIAGNOSIS — R06.02 SHORTNESS OF BREATH: ICD-10-CM

## 2021-11-28 DIAGNOSIS — R94.31 ST SEGMENT ELEVATION: ICD-10-CM

## 2021-11-28 DIAGNOSIS — Z51.5 ENCOUNTER FOR PALLIATIVE CARE: ICD-10-CM

## 2021-11-28 DIAGNOSIS — R00.0 TACHYCARDIA: ICD-10-CM

## 2021-11-28 DIAGNOSIS — D64.9 NORMOCYTIC ANEMIA: ICD-10-CM

## 2021-11-28 DIAGNOSIS — D72.829 LEUKOCYTOSIS: ICD-10-CM

## 2021-11-28 DIAGNOSIS — N17.0 ACUTE RENAL FAILURE WITH TUBULAR NECROSIS: ICD-10-CM

## 2021-11-28 DIAGNOSIS — D72.829 LEUKOCYTOSIS, UNSPECIFIED TYPE: ICD-10-CM

## 2021-11-28 DIAGNOSIS — Z71.89 COUNSELING REGARDING ADVANCED CARE PLANNING AND GOALS OF CARE: ICD-10-CM

## 2021-11-28 DIAGNOSIS — E66.9 DIABETES MELLITUS TYPE 2 IN OBESE: ICD-10-CM

## 2021-11-28 DIAGNOSIS — J96.01 ACUTE RESPIRATORY FAILURE WITH HYPOXIA: ICD-10-CM

## 2021-11-28 DIAGNOSIS — R40.0 SOMNOLENCE: ICD-10-CM

## 2021-11-28 DIAGNOSIS — E03.9 HYPOTHYROIDISM, UNSPECIFIED TYPE: ICD-10-CM

## 2021-11-28 DIAGNOSIS — R57.9 SHOCK, UNSPECIFIED: ICD-10-CM

## 2021-11-28 DIAGNOSIS — R07.9 CHEST PAIN: ICD-10-CM

## 2021-11-28 DIAGNOSIS — R00.0 SINUS TACHYCARDIA: ICD-10-CM

## 2021-11-28 DIAGNOSIS — R53.81 DEBILITY: ICD-10-CM

## 2021-11-28 DIAGNOSIS — Z96.0 RETAINED URETERAL STENT: ICD-10-CM

## 2021-11-28 DIAGNOSIS — F33.1 MAJOR DEPRESSIVE DISORDER, RECURRENT EPISODE, MODERATE: ICD-10-CM

## 2021-11-28 DIAGNOSIS — R07.9 CHEST PAIN, UNSPECIFIED TYPE: ICD-10-CM

## 2021-11-28 DIAGNOSIS — N20.0 NEPHROLITHIASIS: ICD-10-CM

## 2021-11-28 DIAGNOSIS — N17.9 ACUTE RENAL FAILURE, UNSPECIFIED ACUTE RENAL FAILURE TYPE: ICD-10-CM

## 2021-11-28 DIAGNOSIS — E87.1 HYPONATREMIA: ICD-10-CM

## 2021-11-28 DIAGNOSIS — R31.0 GROSS HEMATURIA: ICD-10-CM

## 2021-11-28 DIAGNOSIS — Z93.59 SUPRAPUBIC CATHETER: ICD-10-CM

## 2021-11-28 DIAGNOSIS — I48.91 A-FIB: ICD-10-CM

## 2021-11-28 DIAGNOSIS — J96.01 ACUTE HYPOXEMIC RESPIRATORY FAILURE: ICD-10-CM

## 2021-11-28 DIAGNOSIS — E11.69 DIABETES MELLITUS TYPE 2 IN OBESE: ICD-10-CM

## 2021-11-28 DIAGNOSIS — E87.20 METABOLIC ACIDOSIS: ICD-10-CM

## 2021-11-28 DIAGNOSIS — I48.0 PAROXYSMAL ATRIAL FIBRILLATION: ICD-10-CM

## 2021-11-28 DIAGNOSIS — Z71.89 DNR (DO NOT RESUSCITATE) DISCUSSION: ICD-10-CM

## 2021-11-28 DIAGNOSIS — I21.3 STEMI (ST ELEVATION MYOCARDIAL INFARCTION): ICD-10-CM

## 2021-11-28 DIAGNOSIS — G93.40 ACUTE ENCEPHALOPATHY: ICD-10-CM

## 2021-11-28 LAB
ALBUMIN SERPL BCP-MCNC: 2.2 G/DL (ref 3.5–5.2)
ALP SERPL-CCNC: 150 U/L (ref 55–135)
ALT SERPL W/O P-5'-P-CCNC: 16 U/L (ref 10–44)
ANION GAP SERPL CALC-SCNC: ABNORMAL MMOL/L (ref 8–16)
ANISOCYTOSIS BLD QL SMEAR: SLIGHT
AST SERPL-CCNC: 28 U/L (ref 10–40)
BACTERIA #/AREA URNS AUTO: ABNORMAL /HPF
BASOPHILS # BLD AUTO: 0.05 K/UL (ref 0–0.2)
BASOPHILS NFR BLD: 0.2 % (ref 0–1.9)
BILIRUB SERPL-MCNC: 0.3 MG/DL (ref 0.1–1)
BILIRUB UR QL STRIP: ABNORMAL
BNP SERPL-MCNC: 773 PG/ML (ref 0–99)
BUN SERPL-MCNC: 124 MG/DL (ref 8–23)
CALCIUM SERPL-MCNC: 8.9 MG/DL (ref 8.7–10.5)
CHLORIDE SERPL-SCNC: 111 MMOL/L (ref 95–110)
CLARITY UR REFRACT.AUTO: ABNORMAL
CO2 SERPL-SCNC: <5 MMOL/L (ref 23–29)
COLOR UR AUTO: ABNORMAL
CREAT SERPL-MCNC: 8 MG/DL (ref 0.5–1.4)
CTP QC/QA: YES
DIFFERENTIAL METHOD: ABNORMAL
EOSINOPHIL # BLD AUTO: 0 K/UL (ref 0–0.5)
EOSINOPHIL NFR BLD: 0 % (ref 0–8)
ERYTHROCYTE [DISTWIDTH] IN BLOOD BY AUTOMATED COUNT: 16.6 % (ref 11.5–14.5)
EST. GFR  (AFRICAN AMERICAN): 5.4 ML/MIN/1.73 M^2
EST. GFR  (NON AFRICAN AMERICAN): 4.7 ML/MIN/1.73 M^2
GLUCOSE SERPL-MCNC: 145 MG/DL (ref 70–110)
GLUCOSE UR QL STRIP: NEGATIVE
HCT VFR BLD AUTO: 25.5 % (ref 37–48.5)
HGB BLD-MCNC: 7.3 G/DL (ref 12–16)
HGB UR QL STRIP: ABNORMAL
HYALINE CASTS UR QL AUTO: 2 /LPF
HYPOCHROMIA BLD QL SMEAR: ABNORMAL
IMM GRANULOCYTES # BLD AUTO: 1.02 K/UL (ref 0–0.04)
IMM GRANULOCYTES NFR BLD AUTO: 4.6 % (ref 0–0.5)
KETONES UR QL STRIP: ABNORMAL
LACTATE SERPL-SCNC: 0.8 MMOL/L (ref 0.5–2.2)
LEUKOCYTE ESTERASE UR QL STRIP: ABNORMAL
LYMPHOCYTES # BLD AUTO: 1.3 K/UL (ref 1–4.8)
LYMPHOCYTES NFR BLD: 5.8 % (ref 18–48)
MCH RBC QN AUTO: 26.9 PG (ref 27–31)
MCHC RBC AUTO-ENTMCNC: 28.6 G/DL (ref 32–36)
MCV RBC AUTO: 94 FL (ref 82–98)
MICROSCOPIC COMMENT: ABNORMAL
MONOCYTES # BLD AUTO: 1.3 K/UL (ref 0.3–1)
MONOCYTES NFR BLD: 6 % (ref 4–15)
NEUTROPHILS # BLD AUTO: 18.4 K/UL (ref 1.8–7.7)
NEUTROPHILS NFR BLD: 83.4 % (ref 38–73)
NITRITE UR QL STRIP: NEGATIVE
NRBC BLD-RTO: 1 /100 WBC
OVALOCYTES BLD QL SMEAR: ABNORMAL
PH UR STRIP: 8 [PH] (ref 5–8)
PLATELET # BLD AUTO: 430 K/UL (ref 150–450)
PLATELET BLD QL SMEAR: ABNORMAL
PMV BLD AUTO: 9.3 FL (ref 9.2–12.9)
POIKILOCYTOSIS BLD QL SMEAR: SLIGHT
POLYCHROMASIA BLD QL SMEAR: ABNORMAL
POTASSIUM SERPL-SCNC: 5.7 MMOL/L (ref 3.5–5.1)
PROT SERPL-MCNC: 6.7 G/DL (ref 6–8.4)
PROT UR QL STRIP: ABNORMAL
RBC # BLD AUTO: 2.71 M/UL (ref 4–5.4)
RBC #/AREA URNS AUTO: 73 /HPF (ref 0–4)
SARS-COV-2 RDRP RESP QL NAA+PROBE: NEGATIVE
SODIUM SERPL-SCNC: 134 MMOL/L (ref 136–145)
SP GR UR STRIP: 1.02 (ref 1–1.03)
TROPONIN I SERPL DL<=0.01 NG/ML-MCNC: 0.18 NG/ML (ref 0–0.03)
TROPONIN I SERPL DL<=0.01 NG/ML-MCNC: 0.2 NG/ML (ref 0–0.03)
URN SPEC COLLECT METH UR: ABNORMAL
WBC # BLD AUTO: 22.05 K/UL (ref 3.9–12.7)
WBC #/AREA URNS AUTO: >100 /HPF (ref 0–5)

## 2021-11-28 PROCEDURE — 25000003 PHARM REV CODE 250: Performed by: STUDENT IN AN ORGANIZED HEALTH CARE EDUCATION/TRAINING PROGRAM

## 2021-11-28 PROCEDURE — 99291 CRITICAL CARE FIRST HOUR: CPT | Mod: 59

## 2021-11-28 PROCEDURE — 99291 CRITICAL CARE FIRST HOUR: CPT | Mod: CS,,, | Performed by: PHYSICIAN ASSISTANT

## 2021-11-28 PROCEDURE — 85025 COMPLETE CBC W/AUTO DIFF WBC: CPT | Performed by: PHYSICIAN ASSISTANT

## 2021-11-28 PROCEDURE — 25000003 PHARM REV CODE 250: Performed by: PHYSICIAN ASSISTANT

## 2021-11-28 PROCEDURE — 63600175 PHARM REV CODE 636 W HCPCS: Performed by: PHYSICIAN ASSISTANT

## 2021-11-28 PROCEDURE — 36600 WITHDRAWAL OF ARTERIAL BLOOD: CPT

## 2021-11-28 PROCEDURE — 83605 ASSAY OF LACTIC ACID: CPT | Performed by: PHYSICIAN ASSISTANT

## 2021-11-28 PROCEDURE — 12000002 HC ACUTE/MED SURGE SEMI-PRIVATE ROOM

## 2021-11-28 PROCEDURE — 84484 ASSAY OF TROPONIN QUANT: CPT | Performed by: EMERGENCY MEDICINE

## 2021-11-28 PROCEDURE — 96361 HYDRATE IV INFUSION ADD-ON: CPT

## 2021-11-28 PROCEDURE — 80053 COMPREHEN METABOLIC PANEL: CPT | Performed by: PHYSICIAN ASSISTANT

## 2021-11-28 PROCEDURE — 36556 INSERT NON-TUNNEL CV CATH: CPT

## 2021-11-28 PROCEDURE — C1752 CATH,HEMODIALYSIS,SHORT-TERM: HCPCS

## 2021-11-28 PROCEDURE — 99900035 HC TECH TIME PER 15 MIN (STAT)

## 2021-11-28 PROCEDURE — 81001 URINALYSIS AUTO W/SCOPE: CPT | Performed by: PHYSICIAN ASSISTANT

## 2021-11-28 PROCEDURE — 51701 INSERT BLADDER CATHETER: CPT

## 2021-11-28 PROCEDURE — 87040 BLOOD CULTURE FOR BACTERIA: CPT | Performed by: PHYSICIAN ASSISTANT

## 2021-11-28 PROCEDURE — 82803 BLOOD GASES ANY COMBINATION: CPT

## 2021-11-28 PROCEDURE — 25000003 PHARM REV CODE 250: Performed by: EMERGENCY MEDICINE

## 2021-11-28 PROCEDURE — 87086 URINE CULTURE/COLONY COUNT: CPT | Performed by: PHYSICIAN ASSISTANT

## 2021-11-28 PROCEDURE — U0002 COVID-19 LAB TEST NON-CDC: HCPCS | Performed by: PHYSICIAN ASSISTANT

## 2021-11-28 PROCEDURE — C1751 CATH, INF, PER/CENT/MIDLINE: HCPCS

## 2021-11-28 PROCEDURE — 94640 AIRWAY INHALATION TREATMENT: CPT

## 2021-11-28 PROCEDURE — 99223 PR INITIAL HOSPITAL CARE,LEVL III: ICD-10-PCS | Mod: ,,, | Performed by: INTERNAL MEDICINE

## 2021-11-28 PROCEDURE — 99223 1ST HOSP IP/OBS HIGH 75: CPT | Mod: ,,, | Performed by: INTERNAL MEDICINE

## 2021-11-28 PROCEDURE — 83880 ASSAY OF NATRIURETIC PEPTIDE: CPT | Performed by: PHYSICIAN ASSISTANT

## 2021-11-28 PROCEDURE — 84484 ASSAY OF TROPONIN QUANT: CPT | Mod: 91 | Performed by: PHYSICIAN ASSISTANT

## 2021-11-28 PROCEDURE — 96365 THER/PROPH/DIAG IV INF INIT: CPT | Mod: 59

## 2021-11-28 PROCEDURE — 87076 CULTURE ANAEROBE IDENT EACH: CPT | Performed by: PHYSICIAN ASSISTANT

## 2021-11-28 PROCEDURE — 99291 PR CRITICAL CARE, E/M 30-74 MINUTES: ICD-10-PCS | Mod: CS,,, | Performed by: PHYSICIAN ASSISTANT

## 2021-11-28 PROCEDURE — 25000242 PHARM REV CODE 250 ALT 637 W/ HCPCS: Performed by: STUDENT IN AN ORGANIZED HEALTH CARE EDUCATION/TRAINING PROGRAM

## 2021-11-28 PROCEDURE — 51702 INSERT TEMP BLADDER CATH: CPT

## 2021-11-28 RX ORDER — SODIUM CHLORIDE 9 MG/ML
INJECTION, SOLUTION INTRAVENOUS ONCE
Status: DISCONTINUED | OUTPATIENT
Start: 2021-11-29 | End: 2021-12-06

## 2021-11-28 RX ORDER — IPRATROPIUM BROMIDE AND ALBUTEROL SULFATE 2.5; .5 MG/3ML; MG/3ML
3 SOLUTION RESPIRATORY (INHALATION) EVERY 4 HOURS
Status: DISCONTINUED | OUTPATIENT
Start: 2021-11-28 | End: 2021-12-17

## 2021-11-28 RX ORDER — IBUPROFEN 200 MG
16 TABLET ORAL
Status: DISCONTINUED | OUTPATIENT
Start: 2021-11-28 | End: 2021-12-30 | Stop reason: HOSPADM

## 2021-11-28 RX ORDER — IBUPROFEN 200 MG
24 TABLET ORAL
Status: DISCONTINUED | OUTPATIENT
Start: 2021-11-28 | End: 2021-12-30 | Stop reason: HOSPADM

## 2021-11-28 RX ORDER — ASPIRIN 325 MG
325 TABLET ORAL
Status: DISCONTINUED | OUTPATIENT
Start: 2021-11-28 | End: 2021-11-28

## 2021-11-28 RX ORDER — ASPIRIN 300 MG/1
300 SUPPOSITORY RECTAL
Status: COMPLETED | OUTPATIENT
Start: 2021-11-28 | End: 2021-11-28

## 2021-11-28 RX ORDER — AMLODIPINE BESYLATE 10 MG/1
10 TABLET ORAL DAILY
Status: DISCONTINUED | OUTPATIENT
Start: 2021-11-28 | End: 2021-11-29

## 2021-11-28 RX ORDER — SODIUM BICARBONATE 1 MEQ/ML
50 SYRINGE (ML) INTRAVENOUS ONCE
Status: COMPLETED | OUTPATIENT
Start: 2021-11-28 | End: 2021-11-28

## 2021-11-28 RX ORDER — SODIUM CHLORIDE 0.9 % (FLUSH) 0.9 %
10 SYRINGE (ML) INJECTION EVERY 12 HOURS PRN
Status: DISCONTINUED | OUTPATIENT
Start: 2021-11-28 | End: 2021-11-29

## 2021-11-28 RX ORDER — HEPARIN SODIUM 5000 [USP'U]/ML
7500 INJECTION, SOLUTION INTRAVENOUS; SUBCUTANEOUS EVERY 8 HOURS
Status: DISCONTINUED | OUTPATIENT
Start: 2021-11-29 | End: 2021-11-29

## 2021-11-28 RX ORDER — GLUCAGON 1 MG
1 KIT INJECTION
Status: DISCONTINUED | OUTPATIENT
Start: 2021-11-28 | End: 2021-11-29

## 2021-11-28 RX ADMIN — TICAGRELOR 180 MG: 90 TABLET ORAL at 09:11

## 2021-11-28 RX ADMIN — SODIUM BICARBONATE 50 MEQ: 84 INJECTION INTRAVENOUS at 10:11

## 2021-11-28 RX ADMIN — SODIUM CHLORIDE, SODIUM LACTATE, POTASSIUM CHLORIDE, AND CALCIUM CHLORIDE 1000 ML: .6; .31; .03; .02 INJECTION, SOLUTION INTRAVENOUS at 08:11

## 2021-11-28 RX ADMIN — IPRATROPIUM BROMIDE AND ALBUTEROL SULFATE 3 ML: 2.5; .5 SOLUTION RESPIRATORY (INHALATION) at 10:11

## 2021-11-28 RX ADMIN — AMLODIPINE BESYLATE 10 MG: 10 TABLET ORAL at 10:11

## 2021-11-28 RX ADMIN — SODIUM CHLORIDE, SODIUM LACTATE, POTASSIUM CHLORIDE, AND CALCIUM CHLORIDE 1000 ML: .6; .31; .03; .02 INJECTION, SOLUTION INTRAVENOUS at 09:11

## 2021-11-28 RX ADMIN — ASPIRIN 300 MG: 300 SUPPOSITORY RECTAL at 09:11

## 2021-11-28 RX ADMIN — VANCOMYCIN HYDROCHLORIDE 2500 MG: 1.5 INJECTION, POWDER, LYOPHILIZED, FOR SOLUTION INTRAVENOUS at 09:11

## 2021-11-28 RX ADMIN — PIPERACILLIN SODIUM AND TAZOBACTAM SODIUM 4.5 G: 4; .5 INJECTION, POWDER, FOR SOLUTION INTRAVENOUS at 09:11

## 2021-11-29 PROBLEM — G93.40 ACUTE ENCEPHALOPATHY: Status: ACTIVE | Noted: 2021-11-29

## 2021-11-29 PROBLEM — I48.91 A-FIB: Status: ACTIVE | Noted: 2021-11-29

## 2021-11-29 PROBLEM — R04.0 EPISTAXIS: Status: ACTIVE | Noted: 2021-11-29

## 2021-11-29 LAB
ABO + RH BLD: NORMAL
ALBUMIN SERPL BCP-MCNC: 1.8 G/DL (ref 3.5–5.2)
ALBUMIN SERPL BCP-MCNC: 2.1 G/DL (ref 3.5–5.2)
ALLENS TEST: ABNORMAL
ALP SERPL-CCNC: 120 U/L (ref 55–135)
ALT SERPL W/O P-5'-P-CCNC: 15 U/L (ref 10–44)
ANION GAP SERPL CALC-SCNC: 16 MMOL/L (ref 8–16)
ANION GAP SERPL CALC-SCNC: 20 MMOL/L (ref 8–16)
ANION GAP SERPL CALC-SCNC: 21 MMOL/L (ref 8–16)
ANION GAP SERPL CALC-SCNC: 21 MMOL/L (ref 8–16)
APTT BLDCRRT: 107.5 SEC (ref 21–32)
APTT BLDCRRT: 32.1 SEC (ref 21–32)
AST SERPL-CCNC: 30 U/L (ref 10–40)
AV INDEX (PROSTH): 1.12
AV MEAN GRADIENT: 4 MMHG
AV PEAK GRADIENT: 10 MMHG
AV VALVE AREA: 3.57 CM2
AV VELOCITY RATIO: 0.97
B-OH-BUTYR BLD STRIP-SCNC: 4.3 MMOL/L (ref 0–0.5)
B-OH-BUTYR BLD STRIP-SCNC: 4.6 MMOL/L (ref 0–0.5)
BACTERIA UR CULT: NORMAL
BACTERIA UR CULT: NORMAL
BASOPHILS # BLD AUTO: 0.02 K/UL (ref 0–0.2)
BASOPHILS # BLD AUTO: 0.02 K/UL (ref 0–0.2)
BASOPHILS # BLD AUTO: 0.03 K/UL (ref 0–0.2)
BASOPHILS NFR BLD: 0.1 % (ref 0–1.9)
BASOPHILS NFR BLD: 0.2 % (ref 0–1.9)
BASOPHILS NFR BLD: 0.2 % (ref 0–1.9)
BILIRUB SERPL-MCNC: 0.5 MG/DL (ref 0.1–1)
BLD GP AB SCN CELLS X3 SERPL QL: NORMAL
BLD PROD TYP BPU: NORMAL
BLOOD UNIT EXPIRATION DATE: NORMAL
BLOOD UNIT TYPE CODE: 6200
BLOOD UNIT TYPE: NORMAL
BSA FOR ECHO PROCEDURE: 2.37 M2
BUN SERPL-MCNC: 21 MG/DL (ref 8–23)
BUN SERPL-MCNC: 50 MG/DL (ref 8–23)
BUN SERPL-MCNC: 63 MG/DL (ref 8–23)
BUN SERPL-MCNC: 66 MG/DL (ref 8–23)
CALCIUM SERPL-MCNC: 6.8 MG/DL (ref 8.7–10.5)
CALCIUM SERPL-MCNC: 6.9 MG/DL (ref 8.7–10.5)
CALCIUM SERPL-MCNC: 7.4 MG/DL (ref 8.7–10.5)
CALCIUM SERPL-MCNC: 7.8 MG/DL (ref 8.7–10.5)
CHLORIDE SERPL-SCNC: 102 MMOL/L (ref 95–110)
CHLORIDE SERPL-SCNC: 104 MMOL/L (ref 95–110)
CHLORIDE SERPL-SCNC: 105 MMOL/L (ref 95–110)
CHLORIDE SERPL-SCNC: 99 MMOL/L (ref 95–110)
CHOLEST SERPL-MCNC: 149 MG/DL (ref 120–199)
CHOLEST/HDLC SERPL: 4.1 {RATIO} (ref 2–5)
CK SERPL-CCNC: 789 U/L (ref 20–180)
CO2 SERPL-SCNC: 11 MMOL/L (ref 23–29)
CO2 SERPL-SCNC: 11 MMOL/L (ref 23–29)
CO2 SERPL-SCNC: 18 MMOL/L (ref 23–29)
CO2 SERPL-SCNC: 18 MMOL/L (ref 23–29)
CODING SYSTEM: NORMAL
CREAT SERPL-MCNC: 2.2 MG/DL (ref 0.5–1.4)
CREAT SERPL-MCNC: 3.4 MG/DL (ref 0.5–1.4)
CREAT SERPL-MCNC: 4.6 MG/DL (ref 0.5–1.4)
CREAT SERPL-MCNC: 4.6 MG/DL (ref 0.5–1.4)
CV ECHO LV RWT: 0.36 CM
DELSYS: ABNORMAL
DIFFERENTIAL METHOD: ABNORMAL
DISPENSE STATUS: NORMAL
DOP CALC AO PEAK VEL: 1.56 M/S
DOP CALC AO VTI: 26.78 CM
DOP CALC LVOT AREA: 3.2 CM2
DOP CALC LVOT DIAMETER: 2.01 CM
DOP CALC LVOT PEAK VEL: 1.52 M/S
DOP CALC LVOT STROKE VOLUME: 95.52 CM3
DOP CALCLVOT PEAK VEL VTI: 30.12 CM
E WAVE DECELERATION TIME: 190.92 MSEC
E/A RATIO: 0.84
E/E' RATIO: 11.38 M/S
ECHO LV POSTERIOR WALL: 0.8 CM (ref 0.6–1.1)
EJECTION FRACTION: 60 %
EOSINOPHIL # BLD AUTO: 0 K/UL (ref 0–0.5)
EOSINOPHIL NFR BLD: 0 % (ref 0–8)
ERYTHROCYTE [DISTWIDTH] IN BLOOD BY AUTOMATED COUNT: 15.6 % (ref 11.5–14.5)
ERYTHROCYTE [DISTWIDTH] IN BLOOD BY AUTOMATED COUNT: 15.6 % (ref 11.5–14.5)
ERYTHROCYTE [DISTWIDTH] IN BLOOD BY AUTOMATED COUNT: 15.9 % (ref 11.5–14.5)
ERYTHROCYTE [SEDIMENTATION RATE] IN BLOOD BY WESTERGREN METHOD: 32 MM/H
ERYTHROCYTE [SEDIMENTATION RATE] IN BLOOD BY WESTERGREN METHOD: 32 MM/H
EST. GFR  (AFRICAN AMERICAN): 10.5 ML/MIN/1.73 M^2
EST. GFR  (AFRICAN AMERICAN): 10.5 ML/MIN/1.73 M^2
EST. GFR  (AFRICAN AMERICAN): 15.1 ML/MIN/1.73 M^2
EST. GFR  (AFRICAN AMERICAN): 25.6 ML/MIN/1.73 M^2
EST. GFR  (NON AFRICAN AMERICAN): 13.1 ML/MIN/1.73 M^2
EST. GFR  (NON AFRICAN AMERICAN): 22.2 ML/MIN/1.73 M^2
EST. GFR  (NON AFRICAN AMERICAN): 9.1 ML/MIN/1.73 M^2
EST. GFR  (NON AFRICAN AMERICAN): 9.1 ML/MIN/1.73 M^2
ETCO2: 0
FERRITIN SERPL-MCNC: 757 NG/ML (ref 20–300)
FIO2: 100
FIO2: 50
FRACTIONAL SHORTENING: 42 % (ref 28–44)
GLUCOSE SERPL-MCNC: 114 MG/DL (ref 70–110)
GLUCOSE SERPL-MCNC: 133 MG/DL (ref 70–110)
GLUCOSE SERPL-MCNC: 134 MG/DL (ref 70–110)
GLUCOSE SERPL-MCNC: 158 MG/DL (ref 70–110)
HCO3 UR-SCNC: 15.3 MMOL/L (ref 24–28)
HCO3 UR-SCNC: 16.4 MMOL/L (ref 24–28)
HCO3 UR-SCNC: 3.4 MMOL/L (ref 24–28)
HCO3 UR-SCNC: 4 MMOL/L (ref 24–28)
HCT VFR BLD AUTO: 18.4 % (ref 37–48.5)
HCT VFR BLD AUTO: 21.5 % (ref 37–48.5)
HCT VFR BLD AUTO: 22 % (ref 37–48.5)
HCT VFR BLD CALC: 17 %PCV (ref 36–54)
HCT VFR BLD CALC: 23 %PCV (ref 36–54)
HDLC SERPL-MCNC: 36 MG/DL (ref 40–75)
HDLC SERPL: 24.2 % (ref 20–50)
HGB BLD-MCNC: 6 G/DL (ref 12–16)
HGB BLD-MCNC: 7 G/DL (ref 12–16)
HGB BLD-MCNC: 7.2 G/DL (ref 12–16)
IMM GRANULOCYTES # BLD AUTO: 0.38 K/UL (ref 0–0.04)
IMM GRANULOCYTES # BLD AUTO: 0.41 K/UL (ref 0–0.04)
IMM GRANULOCYTES # BLD AUTO: 0.56 K/UL (ref 0–0.04)
IMM GRANULOCYTES NFR BLD AUTO: 2.6 % (ref 0–0.5)
IMM GRANULOCYTES NFR BLD AUTO: 3.2 % (ref 0–0.5)
IMM GRANULOCYTES NFR BLD AUTO: 3.2 % (ref 0–0.5)
INR PPP: 1 (ref 0.8–1.2)
INTERVENTRICULAR SEPTUM: 0.82 CM (ref 0.6–1.1)
IRON SERPL-MCNC: 59 UG/DL (ref 30–160)
LA MAJOR: 5.15 CM
LA MINOR: 5 CM
LA WIDTH: 3.55 CM
LDLC SERPL CALC-MCNC: 74.6 MG/DL (ref 63–159)
LEFT ATRIUM SIZE: 2.92 CM
LEFT ATRIUM VOLUME INDEX MOD: 23.5 ML/M2
LEFT ATRIUM VOLUME INDEX: 19.9 ML/M2
LEFT ATRIUM VOLUME MOD: 52.83 CM3
LEFT ATRIUM VOLUME: 44.71 CM3
LEFT INTERNAL DIMENSION IN SYSTOLE: 2.59 CM (ref 2.1–4)
LEFT VENTRICLE DIASTOLIC VOLUME INDEX: 40.24 ML/M2
LEFT VENTRICLE DIASTOLIC VOLUME: 90.53 ML
LEFT VENTRICLE MASS INDEX: 51 G/M2
LEFT VENTRICLE SYSTOLIC VOLUME INDEX: 10.8 ML/M2
LEFT VENTRICLE SYSTOLIC VOLUME: 24.33 ML
LEFT VENTRICULAR INTERNAL DIMENSION IN DIASTOLE: 4.46 CM (ref 3.5–6)
LEFT VENTRICULAR MASS: 113.78 G
LV LATERAL E/E' RATIO: 10.57 M/S
LV SEPTAL E/E' RATIO: 12.33 M/S
LYMPHOCYTES # BLD AUTO: 0.5 K/UL (ref 1–4.8)
LYMPHOCYTES # BLD AUTO: 0.9 K/UL (ref 1–4.8)
LYMPHOCYTES # BLD AUTO: 1 K/UL (ref 1–4.8)
LYMPHOCYTES NFR BLD: 3.1 % (ref 18–48)
LYMPHOCYTES NFR BLD: 5.9 % (ref 18–48)
LYMPHOCYTES NFR BLD: 7.7 % (ref 18–48)
MAGNESIUM SERPL-MCNC: 1.6 MG/DL (ref 1.6–2.6)
MAGNESIUM SERPL-MCNC: 1.8 MG/DL (ref 1.6–2.6)
MAGNESIUM SERPL-MCNC: 2 MG/DL (ref 1.6–2.6)
MCH RBC QN AUTO: 26.8 PG (ref 27–31)
MCH RBC QN AUTO: 28.5 PG (ref 27–31)
MCH RBC QN AUTO: 28.8 PG (ref 27–31)
MCHC RBC AUTO-ENTMCNC: 32.6 G/DL (ref 32–36)
MCHC RBC AUTO-ENTMCNC: 32.6 G/DL (ref 32–36)
MCHC RBC AUTO-ENTMCNC: 32.7 G/DL (ref 32–36)
MCV RBC AUTO: 82 FL (ref 82–98)
MCV RBC AUTO: 87 FL (ref 82–98)
MCV RBC AUTO: 88 FL (ref 82–98)
MIN VOL: 12
MIN VOL: 14.7
MODE: ABNORMAL
MONOCYTES # BLD AUTO: 1 K/UL (ref 0.3–1)
MONOCYTES # BLD AUTO: 1.5 K/UL (ref 0.3–1)
MONOCYTES # BLD AUTO: 1.5 K/UL (ref 0.3–1)
MONOCYTES NFR BLD: 10.4 % (ref 4–15)
MONOCYTES NFR BLD: 11.7 % (ref 4–15)
MONOCYTES NFR BLD: 5.7 % (ref 4–15)
MV PEAK A VEL: 0.88 M/S
MV PEAK E VEL: 0.74 M/S
MV STENOSIS PRESSURE HALF TIME: 55.37 MS
MV VALVE AREA P 1/2 METHOD: 3.97 CM2
NEUTROPHILS # BLD AUTO: 10 K/UL (ref 1.8–7.7)
NEUTROPHILS # BLD AUTO: 11.7 K/UL (ref 1.8–7.7)
NEUTROPHILS # BLD AUTO: 15.2 K/UL (ref 1.8–7.7)
NEUTROPHILS NFR BLD: 77.2 % (ref 38–73)
NEUTROPHILS NFR BLD: 81 % (ref 38–73)
NEUTROPHILS NFR BLD: 87.8 % (ref 38–73)
NONHDLC SERPL-MCNC: 113 MG/DL
NRBC BLD-RTO: 1 /100 WBC
NRBC BLD-RTO: 1 /100 WBC
NRBC BLD-RTO: 2 /100 WBC
NUM UNITS TRANS PACKED RBC: NORMAL
OSMOLALITY SERPL: 297 MOSM/KG (ref 275–295)
OSMOLALITY SERPL: 301 MOSM/KG (ref 275–295)
PCO2 BLDA: 15.1 MMHG (ref 35–45)
PCO2 BLDA: 15.7 MMHG (ref 35–45)
PCO2 BLDA: 26.5 MMHG (ref 35–45)
PCO2 BLDA: 28.5 MMHG (ref 35–45)
PEEP: 5
PEEP: 5
PH SMN: 6.95 [PH] (ref 7.35–7.45)
PH SMN: 7.02 [PH] (ref 7.35–7.45)
PH SMN: 7.37 [PH] (ref 7.35–7.45)
PH SMN: 7.37 [PH] (ref 7.35–7.45)
PHOSPHATE SERPL-MCNC: 2.2 MG/DL (ref 2.7–4.5)
PHOSPHATE SERPL-MCNC: 3.3 MG/DL (ref 2.7–4.5)
PHOSPHATE SERPL-MCNC: 4.3 MG/DL (ref 2.7–4.5)
PIP: 18
PIP: 21
PLATELET # BLD AUTO: 334 K/UL (ref 150–450)
PLATELET # BLD AUTO: 344 K/UL (ref 150–450)
PLATELET # BLD AUTO: 388 K/UL (ref 150–450)
PMV BLD AUTO: 8.9 FL (ref 9.2–12.9)
PMV BLD AUTO: 9.3 FL (ref 9.2–12.9)
PMV BLD AUTO: 9.4 FL (ref 9.2–12.9)
PO2 BLDA: 174 MMHG (ref 80–100)
PO2 BLDA: 429 MMHG (ref 80–100)
PO2 BLDA: 44 MMHG (ref 40–60)
PO2 BLDA: 51 MMHG (ref 40–60)
POC BE: -10 MMOL/L
POC BE: -27 MMOL/L
POC BE: -29 MMOL/L
POC BE: -9 MMOL/L
POC IONIZED CALCIUM: 0.93 MMOL/L (ref 1.06–1.42)
POC IONIZED CALCIUM: 1.02 MMOL/L (ref 1.06–1.42)
POC SATURATED O2: 100 % (ref 95–100)
POC SATURATED O2: 65 % (ref 95–100)
POC SATURATED O2: 79 % (ref 95–100)
POC SATURATED O2: 99 % (ref 95–100)
POC TCO2: 16 MMOL/L (ref 23–27)
POC TCO2: 17 MMOL/L (ref 24–29)
POC TCO2: <5 MMOL/L (ref 23–27)
POC TCO2: <5 MMOL/L (ref 24–29)
POCT GLUCOSE: 118 MG/DL (ref 70–110)
POCT GLUCOSE: 124 MG/DL (ref 70–110)
POCT GLUCOSE: 128 MG/DL (ref 70–110)
POTASSIUM BLD-SCNC: 2.8 MMOL/L (ref 3.5–5.1)
POTASSIUM BLD-SCNC: 3.9 MMOL/L (ref 3.5–5.1)
POTASSIUM SERPL-SCNC: 3.1 MMOL/L (ref 3.5–5.1)
POTASSIUM SERPL-SCNC: 3.1 MMOL/L (ref 3.5–5.1)
POTASSIUM SERPL-SCNC: 4 MMOL/L (ref 3.5–5.1)
POTASSIUM SERPL-SCNC: 4.2 MMOL/L (ref 3.5–5.1)
PROT SERPL-MCNC: 4.5 G/DL (ref 6–8.4)
PROTHROMBIN TIME: 11.4 SEC (ref 9–12.5)
RBC # BLD AUTO: 2.24 M/UL (ref 4–5.4)
RBC # BLD AUTO: 2.46 M/UL (ref 4–5.4)
RBC # BLD AUTO: 2.5 M/UL (ref 4–5.4)
RIGHT VENTRICULAR END-DIASTOLIC DIMENSION: 3.37 CM
RV TISSUE DOPPLER FREE WALL SYSTOLIC VELOCITY 1 (APICAL 4 CHAMBER VIEW): 12.5 CM/S
SAMPLE: ABNORMAL
SATURATED IRON: 37 % (ref 20–50)
SINUS: 3.41 CM
SITE: ABNORMAL
SODIUM BLD-SCNC: 138 MMOL/L (ref 136–145)
SODIUM BLD-SCNC: 139 MMOL/L (ref 136–145)
SODIUM SERPL-SCNC: 134 MMOL/L (ref 136–145)
SODIUM SERPL-SCNC: 136 MMOL/L (ref 136–145)
SODIUM SERPL-SCNC: 137 MMOL/L (ref 136–145)
SODIUM SERPL-SCNC: 139 MMOL/L (ref 136–145)
TDI LATERAL: 0.07 M/S
TDI SEPTAL: 0.06 M/S
TDI: 0.07 M/S
TOTAL IRON BINDING CAPACITY: 158 UG/DL (ref 250–450)
TRANSFERRIN SERPL-MCNC: 107 MG/DL (ref 200–375)
TRICUSPID ANNULAR PLANE SYSTOLIC EXCURSION: 1.87 CM
TRIGL SERPL-MCNC: 192 MG/DL (ref 30–150)
TROPONIN I SERPL DL<=0.01 NG/ML-MCNC: 0.15 NG/ML (ref 0–0.03)
TSH SERPL DL<=0.005 MIU/L-ACNC: 2.02 UIU/ML (ref 0.4–4)
VANCOMYCIN SERPL-MCNC: 16.8 UG/ML
VANCOMYCIN TROUGH SERPL-MCNC: 16.7 UG/ML (ref 10–22)
VT: 360
VT: 360
WBC # BLD AUTO: 12.92 K/UL (ref 3.9–12.7)
WBC # BLD AUTO: 14.44 K/UL (ref 3.9–12.7)
WBC # BLD AUTO: 17.28 K/UL (ref 3.9–12.7)

## 2021-11-29 PROCEDURE — 80100014 HC HEMODIALYSIS 1:1

## 2021-11-29 PROCEDURE — 85025 COMPLETE CBC W/AUTO DIFF WBC: CPT | Performed by: STUDENT IN AN ORGANIZED HEALTH CARE EDUCATION/TRAINING PROGRAM

## 2021-11-29 PROCEDURE — 63600175 PHARM REV CODE 636 W HCPCS

## 2021-11-29 PROCEDURE — 36620 ARTERIAL LINE: ICD-10-PCS | Mod: 59,,, | Performed by: INTERNAL MEDICINE

## 2021-11-29 PROCEDURE — 20000000 HC ICU ROOM

## 2021-11-29 PROCEDURE — 94761 N-INVAS EAR/PLS OXIMETRY MLT: CPT

## 2021-11-29 PROCEDURE — 82728 ASSAY OF FERRITIN: CPT | Performed by: STUDENT IN AN ORGANIZED HEALTH CARE EDUCATION/TRAINING PROGRAM

## 2021-11-29 PROCEDURE — 36430 TRANSFUSION BLD/BLD COMPNT: CPT

## 2021-11-29 PROCEDURE — 99222 PR INITIAL HOSPITAL CARE,LEVL II: ICD-10-PCS | Mod: ,,, | Performed by: STUDENT IN AN ORGANIZED HEALTH CARE EDUCATION/TRAINING PROGRAM

## 2021-11-29 PROCEDURE — 83930 ASSAY OF BLOOD OSMOLALITY: CPT | Performed by: NURSE PRACTITIONER

## 2021-11-29 PROCEDURE — 93010 ELECTROCARDIOGRAM REPORT: CPT | Mod: ,,, | Performed by: INTERNAL MEDICINE

## 2021-11-29 PROCEDURE — P9016 RBC LEUKOCYTES REDUCED: HCPCS | Performed by: STUDENT IN AN ORGANIZED HEALTH CARE EDUCATION/TRAINING PROGRAM

## 2021-11-29 PROCEDURE — 83735 ASSAY OF MAGNESIUM: CPT | Performed by: STUDENT IN AN ORGANIZED HEALTH CARE EDUCATION/TRAINING PROGRAM

## 2021-11-29 PROCEDURE — 94002 VENT MGMT INPAT INIT DAY: CPT

## 2021-11-29 PROCEDURE — 36415 COLL VENOUS BLD VENIPUNCTURE: CPT | Performed by: INTERNAL MEDICINE

## 2021-11-29 PROCEDURE — 86900 BLOOD TYPING SEROLOGIC ABO: CPT | Performed by: INTERNAL MEDICINE

## 2021-11-29 PROCEDURE — 82010 KETONE BODYS QUAN: CPT | Mod: 91 | Performed by: STUDENT IN AN ORGANIZED HEALTH CARE EDUCATION/TRAINING PROGRAM

## 2021-11-29 PROCEDURE — 25000003 PHARM REV CODE 250: Performed by: NURSE PRACTITIONER

## 2021-11-29 PROCEDURE — 86920 COMPATIBILITY TEST SPIN: CPT | Performed by: STUDENT IN AN ORGANIZED HEALTH CARE EDUCATION/TRAINING PROGRAM

## 2021-11-29 PROCEDURE — 25000003 PHARM REV CODE 250

## 2021-11-29 PROCEDURE — 83735 ASSAY OF MAGNESIUM: CPT | Mod: 91 | Performed by: NURSE PRACTITIONER

## 2021-11-29 PROCEDURE — 93005 ELECTROCARDIOGRAM TRACING: CPT

## 2021-11-29 PROCEDURE — 99900026 HC AIRWAY MAINTENANCE (STAT)

## 2021-11-29 PROCEDURE — 43752 NASAL/OROGASTRIC W/TUBE PLMT: CPT

## 2021-11-29 PROCEDURE — 36556 PR INSERT NON-TUNNEL CV CATH 5+ YRS OLD: ICD-10-PCS | Mod: 59,GC,, | Performed by: INTERNAL MEDICINE

## 2021-11-29 PROCEDURE — 83930 ASSAY OF BLOOD OSMOLALITY: CPT | Mod: 91 | Performed by: STUDENT IN AN ORGANIZED HEALTH CARE EDUCATION/TRAINING PROGRAM

## 2021-11-29 PROCEDURE — 80069 RENAL FUNCTION PANEL: CPT | Performed by: STUDENT IN AN ORGANIZED HEALTH CARE EDUCATION/TRAINING PROGRAM

## 2021-11-29 PROCEDURE — 31500 PR INSERT, EMERGENCY ENDOTRACH AIRWAY: ICD-10-PCS | Mod: ,,, | Performed by: INTERNAL MEDICINE

## 2021-11-29 PROCEDURE — 63600175 PHARM REV CODE 636 W HCPCS: Performed by: NURSE PRACTITIONER

## 2021-11-29 PROCEDURE — 85014 HEMATOCRIT: CPT

## 2021-11-29 PROCEDURE — 82550 ASSAY OF CK (CPK): CPT | Performed by: STUDENT IN AN ORGANIZED HEALTH CARE EDUCATION/TRAINING PROGRAM

## 2021-11-29 PROCEDURE — 84132 ASSAY OF SERUM POTASSIUM: CPT

## 2021-11-29 PROCEDURE — 82010 KETONE BODYS QUAN: CPT | Performed by: NURSE PRACTITIONER

## 2021-11-29 PROCEDURE — 82800 BLOOD PH: CPT

## 2021-11-29 PROCEDURE — 86920 COMPATIBILITY TEST SPIN: CPT | Performed by: INTERNAL MEDICINE

## 2021-11-29 PROCEDURE — 84443 ASSAY THYROID STIM HORMONE: CPT | Performed by: STUDENT IN AN ORGANIZED HEALTH CARE EDUCATION/TRAINING PROGRAM

## 2021-11-29 PROCEDURE — 85025 COMPLETE CBC W/AUTO DIFF WBC: CPT | Mod: 91 | Performed by: NURSE PRACTITIONER

## 2021-11-29 PROCEDURE — 63600175 PHARM REV CODE 636 W HCPCS: Performed by: INTERNAL MEDICINE

## 2021-11-29 PROCEDURE — 85610 PROTHROMBIN TIME: CPT | Performed by: STUDENT IN AN ORGANIZED HEALTH CARE EDUCATION/TRAINING PROGRAM

## 2021-11-29 PROCEDURE — 25000003 PHARM REV CODE 250: Performed by: INTERNAL MEDICINE

## 2021-11-29 PROCEDURE — 80061 LIPID PANEL: CPT | Performed by: STUDENT IN AN ORGANIZED HEALTH CARE EDUCATION/TRAINING PROGRAM

## 2021-11-29 PROCEDURE — 85730 THROMBOPLASTIN TIME PARTIAL: CPT | Mod: 91 | Performed by: STUDENT IN AN ORGANIZED HEALTH CARE EDUCATION/TRAINING PROGRAM

## 2021-11-29 PROCEDURE — 90945 DIALYSIS ONE EVALUATION: CPT

## 2021-11-29 PROCEDURE — 27000221 HC OXYGEN, UP TO 24 HOURS

## 2021-11-29 PROCEDURE — 80053 COMPREHEN METABOLIC PANEL: CPT | Performed by: NURSE PRACTITIONER

## 2021-11-29 PROCEDURE — 63600175 PHARM REV CODE 636 W HCPCS: Performed by: STUDENT IN AN ORGANIZED HEALTH CARE EDUCATION/TRAINING PROGRAM

## 2021-11-29 PROCEDURE — 93010 EKG 12-LEAD: ICD-10-PCS | Mod: ,,, | Performed by: INTERNAL MEDICINE

## 2021-11-29 PROCEDURE — 99900035 HC TECH TIME PER 15 MIN (STAT)

## 2021-11-29 PROCEDURE — 84466 ASSAY OF TRANSFERRIN: CPT | Performed by: STUDENT IN AN ORGANIZED HEALTH CARE EDUCATION/TRAINING PROGRAM

## 2021-11-29 PROCEDURE — 80320 DRUG SCREEN QUANTALCOHOLS: CPT | Performed by: NURSE PRACTITIONER

## 2021-11-29 PROCEDURE — 94003 VENT MGMT INPAT SUBQ DAY: CPT

## 2021-11-29 PROCEDURE — 84484 ASSAY OF TROPONIN QUANT: CPT | Performed by: STUDENT IN AN ORGANIZED HEALTH CARE EDUCATION/TRAINING PROGRAM

## 2021-11-29 PROCEDURE — 36556 INSERT NON-TUNNEL CV CATH: CPT | Mod: 59,GC,, | Performed by: INTERNAL MEDICINE

## 2021-11-29 PROCEDURE — 99291 PR CRITICAL CARE, E/M 30-74 MINUTES: ICD-10-PCS | Mod: 25,GC,, | Performed by: INTERNAL MEDICINE

## 2021-11-29 PROCEDURE — 31500 INSERT EMERGENCY AIRWAY: CPT | Mod: ,,, | Performed by: INTERNAL MEDICINE

## 2021-11-29 PROCEDURE — 25000242 PHARM REV CODE 250 ALT 637 W/ HCPCS: Performed by: STUDENT IN AN ORGANIZED HEALTH CARE EDUCATION/TRAINING PROGRAM

## 2021-11-29 PROCEDURE — 80069 RENAL FUNCTION PANEL: CPT | Mod: 91 | Performed by: NURSE PRACTITIONER

## 2021-11-29 PROCEDURE — 85025 COMPLETE CBC W/AUTO DIFF WBC: CPT | Mod: 91 | Performed by: INTERNAL MEDICINE

## 2021-11-29 PROCEDURE — 99291 CRITICAL CARE FIRST HOUR: CPT | Mod: 25,GC,, | Performed by: INTERNAL MEDICINE

## 2021-11-29 PROCEDURE — 80202 ASSAY OF VANCOMYCIN: CPT | Performed by: STUDENT IN AN ORGANIZED HEALTH CARE EDUCATION/TRAINING PROGRAM

## 2021-11-29 PROCEDURE — 31500 INSERT EMERGENCY AIRWAY: CPT

## 2021-11-29 PROCEDURE — 27200188 HC TRANSDUCER, ART ADULT/PEDS

## 2021-11-29 PROCEDURE — 80202 ASSAY OF VANCOMYCIN: CPT | Mod: 91 | Performed by: INTERNAL MEDICINE

## 2021-11-29 PROCEDURE — 37799 UNLISTED PX VASCULAR SURGERY: CPT

## 2021-11-29 PROCEDURE — 25000003 PHARM REV CODE 250: Performed by: STUDENT IN AN ORGANIZED HEALTH CARE EDUCATION/TRAINING PROGRAM

## 2021-11-29 PROCEDURE — 94640 AIRWAY INHALATION TREATMENT: CPT

## 2021-11-29 PROCEDURE — 84295 ASSAY OF SERUM SODIUM: CPT

## 2021-11-29 PROCEDURE — 99222 1ST HOSP IP/OBS MODERATE 55: CPT | Mod: ,,, | Performed by: STUDENT IN AN ORGANIZED HEALTH CARE EDUCATION/TRAINING PROGRAM

## 2021-11-29 PROCEDURE — 82330 ASSAY OF CALCIUM: CPT

## 2021-11-29 PROCEDURE — 82803 BLOOD GASES ANY COMBINATION: CPT

## 2021-11-29 PROCEDURE — C1751 CATH, INF, PER/CENT/MIDLINE: HCPCS

## 2021-11-29 PROCEDURE — 36620 INSERTION CATHETER ARTERY: CPT

## 2021-11-29 PROCEDURE — 36620 INSERTION CATHETER ARTERY: CPT | Mod: 59,,, | Performed by: INTERNAL MEDICINE

## 2021-11-29 RX ORDER — HYDROCODONE BITARTRATE AND ACETAMINOPHEN 500; 5 MG/1; MG/1
TABLET ORAL CONTINUOUS
Status: DISCONTINUED | OUTPATIENT
Start: 2021-11-29 | End: 2021-12-02

## 2021-11-29 RX ORDER — POTASSIUM CHLORIDE 29.8 MG/ML
40 INJECTION INTRAVENOUS ONCE
Status: COMPLETED | OUTPATIENT
Start: 2021-11-29 | End: 2021-11-29

## 2021-11-29 RX ORDER — ETOMIDATE 2 MG/ML
20 INJECTION INTRAVENOUS ONCE
Status: COMPLETED | OUTPATIENT
Start: 2021-11-29 | End: 2021-11-29

## 2021-11-29 RX ORDER — HYDROCODONE BITARTRATE AND ACETAMINOPHEN 500; 5 MG/1; MG/1
TABLET ORAL
Status: DISCONTINUED | OUTPATIENT
Start: 2021-11-29 | End: 2021-11-30

## 2021-11-29 RX ORDER — OXYMETAZOLINE HCL 0.05 %
2 SPRAY, NON-AEROSOL (ML) NASAL ONCE
Status: COMPLETED | OUTPATIENT
Start: 2021-11-29 | End: 2021-11-29

## 2021-11-29 RX ORDER — LEVOTHYROXINE SODIUM 50 UG/1
50 TABLET ORAL
Status: DISCONTINUED | OUTPATIENT
Start: 2021-11-30 | End: 2021-12-30 | Stop reason: HOSPADM

## 2021-11-29 RX ORDER — METOPROLOL TARTRATE 1 MG/ML
5 INJECTION, SOLUTION INTRAVENOUS EVERY 5 MIN PRN
Status: COMPLETED | OUTPATIENT
Start: 2021-11-29 | End: 2021-12-01

## 2021-11-29 RX ORDER — CHLORHEXIDINE GLUCONATE ORAL RINSE 1.2 MG/ML
15 SOLUTION DENTAL 2 TIMES DAILY
Status: DISCONTINUED | OUTPATIENT
Start: 2021-11-29 | End: 2021-12-01

## 2021-11-29 RX ORDER — GLUCAGON 1 MG
1 KIT INJECTION
Status: CANCELLED | OUTPATIENT
Start: 2021-11-29

## 2021-11-29 RX ORDER — CLOPIDOGREL BISULFATE 75 MG/1
75 TABLET ORAL DAILY
Status: DISCONTINUED | OUTPATIENT
Start: 2021-11-29 | End: 2021-11-29

## 2021-11-29 RX ORDER — PHENYLEPHRINE HYDROCHLORIDE 10 MG/ML
200 INJECTION INTRAVENOUS ONCE
Status: COMPLETED | OUTPATIENT
Start: 2021-11-29 | End: 2021-11-29

## 2021-11-29 RX ORDER — INSULIN ASPART 100 [IU]/ML
1-10 INJECTION, SOLUTION INTRAVENOUS; SUBCUTANEOUS EVERY 6 HOURS PRN
Status: CANCELLED | OUTPATIENT
Start: 2021-11-29

## 2021-11-29 RX ORDER — ETOMIDATE 2 MG/ML
INJECTION INTRAVENOUS
Status: COMPLETED
Start: 2021-11-29 | End: 2021-11-29

## 2021-11-29 RX ORDER — EPINEPHRINE 1 MG/ML
INJECTION, SOLUTION INTRACARDIAC; INTRAMUSCULAR; INTRAVENOUS; SUBCUTANEOUS
Status: COMPLETED
Start: 2021-11-29 | End: 2021-11-29

## 2021-11-29 RX ORDER — PHENYLEPHRINE HYDROCHLORIDE 10 MG/ML
INJECTION INTRAVENOUS
Status: COMPLETED
Start: 2021-11-29 | End: 2021-11-29

## 2021-11-29 RX ORDER — ANASTROZOLE 1 MG/1
1 TABLET ORAL DAILY
Status: DISCONTINUED | OUTPATIENT
Start: 2021-11-30 | End: 2021-12-01

## 2021-11-29 RX ORDER — METOPROLOL TARTRATE 1 MG/ML
INJECTION, SOLUTION INTRAVENOUS
Status: COMPLETED
Start: 2021-11-29 | End: 2021-11-29

## 2021-11-29 RX ORDER — IPRATROPIUM BROMIDE AND ALBUTEROL SULFATE 2.5; .5 MG/3ML; MG/3ML
3 SOLUTION RESPIRATORY (INHALATION) EVERY 4 HOURS PRN
Status: DISCONTINUED | OUTPATIENT
Start: 2021-11-29 | End: 2021-12-06

## 2021-11-29 RX ORDER — FENTANYL CITRATE 50 UG/ML
50 INJECTION, SOLUTION INTRAMUSCULAR; INTRAVENOUS
Status: ACTIVE | OUTPATIENT
Start: 2021-11-29 | End: 2021-11-30

## 2021-11-29 RX ORDER — PROPOFOL 10 MG/ML
0-50 INJECTION, EMULSION INTRAVENOUS CONTINUOUS
Status: DISCONTINUED | OUTPATIENT
Start: 2021-11-29 | End: 2021-11-30

## 2021-11-29 RX ORDER — CEFEPIME HYDROCHLORIDE 1 G/1
1 INJECTION, POWDER, FOR SOLUTION INTRAMUSCULAR; INTRAVENOUS
Status: DISCONTINUED | OUTPATIENT
Start: 2021-11-29 | End: 2021-11-30

## 2021-11-29 RX ORDER — NAPROXEN SODIUM 220 MG/1
81 TABLET, FILM COATED ORAL DAILY
Status: DISCONTINUED | OUTPATIENT
Start: 2021-11-29 | End: 2021-11-29

## 2021-11-29 RX ORDER — FENTANYL CITRATE-0.9 % NACL/PF 10 MCG/ML
0-200 PLASTIC BAG, INJECTION (ML) INTRAVENOUS CONTINUOUS
Status: DISCONTINUED | OUTPATIENT
Start: 2021-11-29 | End: 2021-11-29

## 2021-11-29 RX ORDER — ASPIRIN 81 MG/1
81 TABLET ORAL DAILY
Status: DISCONTINUED | OUTPATIENT
Start: 2021-11-29 | End: 2021-11-29

## 2021-11-29 RX ORDER — MUPIROCIN 20 MG/G
OINTMENT TOPICAL 2 TIMES DAILY
Status: COMPLETED | OUTPATIENT
Start: 2021-11-29 | End: 2021-12-03

## 2021-11-29 RX ORDER — MAGNESIUM SULFATE HEPTAHYDRATE 40 MG/ML
2 INJECTION, SOLUTION INTRAVENOUS
Status: DISCONTINUED | OUTPATIENT
Start: 2021-11-29 | End: 2021-11-30

## 2021-11-29 RX ORDER — ATORVASTATIN CALCIUM 20 MG/1
80 TABLET, FILM COATED ORAL DAILY
Status: DISCONTINUED | OUTPATIENT
Start: 2021-11-29 | End: 2021-12-01

## 2021-11-29 RX ORDER — ANASTROZOLE 1 MG/1
1 TABLET ORAL DAILY
Status: DISCONTINUED | OUTPATIENT
Start: 2021-11-30 | End: 2021-11-29

## 2021-11-29 RX ORDER — FENTANYL CITRATE 50 UG/ML
50 INJECTION, SOLUTION INTRAMUSCULAR; INTRAVENOUS
Status: DISCONTINUED | OUTPATIENT
Start: 2021-12-01 | End: 2021-12-01

## 2021-11-29 RX ORDER — CLOPIDOGREL 300 MG/1
600 TABLET, FILM COATED ORAL ONCE
Status: DISCONTINUED | OUTPATIENT
Start: 2021-11-29 | End: 2021-11-29

## 2021-11-29 RX ORDER — FAMOTIDINE 10 MG/ML
20 INJECTION INTRAVENOUS DAILY
Status: DISCONTINUED | OUTPATIENT
Start: 2021-11-29 | End: 2021-12-04

## 2021-11-29 RX ORDER — PHENYLEPHRINE HCL IN 0.9% NACL 20MG/250ML
0-5 PLASTIC BAG, INJECTION (ML) INTRAVENOUS CONTINUOUS
Status: DISCONTINUED | OUTPATIENT
Start: 2021-11-29 | End: 2021-11-29

## 2021-11-29 RX ORDER — PHENYLEPHRINE HCL IN 0.9% NACL 1 MG/10 ML
SYRINGE (ML) INTRAVENOUS
Status: COMPLETED
Start: 2021-11-29 | End: 2021-11-29

## 2021-11-29 RX ORDER — SODIUM CHLORIDE 0.9 % (FLUSH) 0.9 %
10 SYRINGE (ML) INJECTION
Status: DISCONTINUED | OUTPATIENT
Start: 2021-11-29 | End: 2021-12-30 | Stop reason: HOSPADM

## 2021-11-29 RX ORDER — ATORVASTATIN CALCIUM 20 MG/1
80 TABLET, FILM COATED ORAL DAILY
Status: DISCONTINUED | OUTPATIENT
Start: 2021-11-29 | End: 2021-11-29

## 2021-11-29 RX ORDER — GLUCAGON 1 MG
1 KIT INJECTION
Status: DISCONTINUED | OUTPATIENT
Start: 2021-11-29 | End: 2021-11-29

## 2021-11-29 RX ORDER — ROCURONIUM BROMIDE 10 MG/ML
INJECTION, SOLUTION INTRAVENOUS
Status: DISPENSED
Start: 2021-11-29 | End: 2021-11-29

## 2021-11-29 RX ORDER — FAMOTIDINE 10 MG/ML
20 INJECTION INTRAVENOUS 2 TIMES DAILY
Status: DISCONTINUED | OUTPATIENT
Start: 2021-11-29 | End: 2021-11-29

## 2021-11-29 RX ORDER — GLUCAGON 1 MG
1 KIT INJECTION
Status: DISCONTINUED | OUTPATIENT
Start: 2021-11-29 | End: 2021-12-30 | Stop reason: HOSPADM

## 2021-11-29 RX ORDER — DEXTROSE MONOHYDRATE 100 MG/ML
INJECTION, SOLUTION INTRAVENOUS CONTINUOUS PRN
Status: DISCONTINUED | OUTPATIENT
Start: 2021-11-29 | End: 2021-11-29

## 2021-11-29 RX ORDER — INSULIN ASPART 100 [IU]/ML
1-10 INJECTION, SOLUTION INTRAVENOUS; SUBCUTANEOUS EVERY 4 HOURS PRN
Status: DISCONTINUED | OUTPATIENT
Start: 2021-11-29 | End: 2021-12-30 | Stop reason: HOSPADM

## 2021-11-29 RX ORDER — PROPOFOL 10 MG/ML
VIAL (ML) INTRAVENOUS
Status: COMPLETED
Start: 2021-11-29 | End: 2021-11-29

## 2021-11-29 RX ORDER — MAGNESIUM SULFATE HEPTAHYDRATE 40 MG/ML
2 INJECTION, SOLUTION INTRAVENOUS ONCE
Status: COMPLETED | OUTPATIENT
Start: 2021-11-29 | End: 2021-11-29

## 2021-11-29 RX ORDER — SUCCINYLCHOLINE CHLORIDE 20 MG/ML
INJECTION INTRAMUSCULAR; INTRAVENOUS
Status: COMPLETED
Start: 2021-11-29 | End: 2021-11-29

## 2021-11-29 RX ORDER — LEVOTHYROXINE SODIUM 50 UG/1
50 TABLET ORAL
Status: DISCONTINUED | OUTPATIENT
Start: 2021-11-29 | End: 2021-11-29

## 2021-11-29 RX ORDER — ASPIRIN 300 MG/1
300 SUPPOSITORY RECTAL DAILY
Status: DISCONTINUED | OUTPATIENT
Start: 2021-11-29 | End: 2021-12-01

## 2021-11-29 RX ORDER — PROPOFOL 10 MG/ML
INJECTION, EMULSION INTRAVENOUS
Status: COMPLETED
Start: 2021-11-29 | End: 2021-11-29

## 2021-11-29 RX ORDER — HEPARIN SODIUM,PORCINE/D5W 25000/250
0-40 INTRAVENOUS SOLUTION INTRAVENOUS CONTINUOUS
Status: DISCONTINUED | OUTPATIENT
Start: 2021-11-29 | End: 2021-11-30

## 2021-11-29 RX ORDER — ROCURONIUM BROMIDE 10 MG/ML
100 INJECTION, SOLUTION INTRAVENOUS ONCE
Status: COMPLETED | OUTPATIENT
Start: 2021-11-29 | End: 2021-11-29

## 2021-11-29 RX ORDER — INSULIN ASPART 100 [IU]/ML
1-10 INJECTION, SOLUTION INTRAVENOUS; SUBCUTANEOUS EVERY 6 HOURS PRN
Status: DISCONTINUED | OUTPATIENT
Start: 2021-11-29 | End: 2021-11-29

## 2021-11-29 RX ADMIN — IPRATROPIUM BROMIDE AND ALBUTEROL SULFATE 3 ML: 2.5; .5 SOLUTION RESPIRATORY (INHALATION) at 11:11

## 2021-11-29 RX ADMIN — PROPOFOL 10 MCG/KG/MIN: 10 INJECTION, EMULSION INTRAVENOUS at 03:11

## 2021-11-29 RX ADMIN — HEPARIN SODIUM AND DEXTROSE 12 UNITS/KG/HR: 10000; 5 INJECTION INTRAVENOUS at 01:11

## 2021-11-29 RX ADMIN — IPRATROPIUM BROMIDE AND ALBUTEROL SULFATE 3 ML: 2.5; .5 SOLUTION RESPIRATORY (INHALATION) at 07:11

## 2021-11-29 RX ADMIN — IPRATROPIUM BROMIDE AND ALBUTEROL SULFATE 3 ML: 2.5; .5 SOLUTION RESPIRATORY (INHALATION) at 03:11

## 2021-11-29 RX ADMIN — Medication 1.5 MCG/KG/MIN: at 06:11

## 2021-11-29 RX ADMIN — AMIODARONE HYDROCHLORIDE 1 MG/MIN: 1.8 INJECTION, SOLUTION INTRAVENOUS at 03:11

## 2021-11-29 RX ADMIN — Medication 1 MG: at 08:11

## 2021-11-29 RX ADMIN — PROPOFOL 10 MCG/KG/MIN: 10 INJECTION, EMULSION INTRAVENOUS at 04:11

## 2021-11-29 RX ADMIN — Medication 0.05 MCG/KG/MIN: at 04:11

## 2021-11-29 RX ADMIN — ETOMIDATE 20 MG: 2 INJECTION INTRAVENOUS at 04:11

## 2021-11-29 RX ADMIN — CHLORHEXIDINE GLUCONATE 0.12% ORAL RINSE 15 ML: 1.2 LIQUID ORAL at 09:11

## 2021-11-29 RX ADMIN — Medication 2 SPRAY: at 05:11

## 2021-11-29 RX ADMIN — AMIODARONE HYDROCHLORIDE 1 MG/MIN: 1.8 INJECTION, SOLUTION INTRAVENOUS at 08:11

## 2021-11-29 RX ADMIN — AMIODARONE HYDROCHLORIDE 0.5 MG/MIN: 1.8 INJECTION, SOLUTION INTRAVENOUS at 09:11

## 2021-11-29 RX ADMIN — AMIODARONE HYDROCHLORIDE 0.5 MG/MIN: 1.8 INJECTION, SOLUTION INTRAVENOUS at 06:11

## 2021-11-29 RX ADMIN — PHENYLEPHRINE HYDROCHLORIDE: 10 INJECTION INTRAVENOUS at 08:11

## 2021-11-29 RX ADMIN — CEFEPIME 1 G: 1 INJECTION, POWDER, FOR SOLUTION INTRAMUSCULAR; INTRAVENOUS at 12:11

## 2021-11-29 RX ADMIN — ROCURONIUM BROMIDE 100 MG: 10 INJECTION, SOLUTION INTRAVENOUS at 04:11

## 2021-11-29 RX ADMIN — METOROPROLOL TARTRATE 5 MG: 5 INJECTION, SOLUTION INTRAVENOUS at 02:11

## 2021-11-29 RX ADMIN — FAMOTIDINE 20 MG: 10 INJECTION, SOLUTION INTRAVENOUS at 09:11

## 2021-11-29 RX ADMIN — SODIUM CHLORIDE: 0.9 INJECTION, SOLUTION INTRAVENOUS at 02:11

## 2021-11-29 RX ADMIN — AMIODARONE HYDROCHLORIDE 150 MG: 1.5 INJECTION, SOLUTION INTRAVENOUS at 02:11

## 2021-11-29 RX ADMIN — MUPIROCIN: 20 OINTMENT TOPICAL at 09:11

## 2021-11-29 RX ADMIN — VANCOMYCIN HYDROCHLORIDE 500 MG: 500 INJECTION, POWDER, LYOPHILIZED, FOR SOLUTION INTRAVENOUS at 04:11

## 2021-11-29 RX ADMIN — PHENYLEPHRINE HYDROCHLORIDE 200 MCG: 10 INJECTION INTRAVENOUS at 04:11

## 2021-11-29 RX ADMIN — SODIUM BICARBONATE: 84 INJECTION, SOLUTION INTRAVENOUS at 12:11

## 2021-11-29 RX ADMIN — IPRATROPIUM BROMIDE AND ALBUTEROL SULFATE 3 ML: 2.5; .5 SOLUTION RESPIRATORY (INHALATION) at 01:11

## 2021-11-29 RX ADMIN — ASPIRIN 300 MG: 300 SUPPOSITORY RECTAL at 04:11

## 2021-11-29 RX ADMIN — IPRATROPIUM BROMIDE AND ALBUTEROL SULFATE 3 ML: 2.5; .5 SOLUTION RESPIRATORY (INHALATION) at 04:11

## 2021-11-29 RX ADMIN — MAGNESIUM SULFATE 2 G: 2 INJECTION INTRAVENOUS at 10:11

## 2021-11-29 RX ADMIN — POTASSIUM CHLORIDE 40 MEQ: 400 INJECTION, SOLUTION INTRAVENOUS at 09:11

## 2021-11-29 RX ADMIN — Medication 25 MCG/HR: at 04:11

## 2021-11-29 RX ADMIN — PHENYLEPHRINE HYDROCHLORIDE 0.9 MCG/KG/MIN: 10 INJECTION INTRAVENOUS at 10:11

## 2021-11-29 RX ADMIN — MUPIROCIN: 20 OINTMENT TOPICAL at 08:11

## 2021-11-30 PROBLEM — G93.40 ACUTE ENCEPHALOPATHY: Status: RESOLVED | Noted: 2021-11-29 | Resolved: 2021-11-30

## 2021-11-30 LAB
ALBUMIN SERPL BCP-MCNC: 1.8 G/DL (ref 3.5–5.2)
ALLENS TEST: ABNORMAL
ALP SERPL-CCNC: 116 U/L (ref 55–135)
ALT SERPL W/O P-5'-P-CCNC: 13 U/L (ref 10–44)
ANION GAP SERPL CALC-SCNC: 14 MMOL/L (ref 8–16)
ANION GAP SERPL CALC-SCNC: 15 MMOL/L (ref 8–16)
ANION GAP SERPL CALC-SCNC: 17 MMOL/L (ref 8–16)
ANION GAP SERPL CALC-SCNC: 17 MMOL/L (ref 8–16)
ANISOCYTOSIS BLD QL SMEAR: SLIGHT
APTT BLDCRRT: 24.1 SEC (ref 21–32)
AST SERPL-CCNC: 27 U/L (ref 10–40)
BASO STIPL BLD QL SMEAR: ABNORMAL
BASOPHILS # BLD AUTO: 0.01 K/UL (ref 0–0.2)
BASOPHILS NFR BLD: 0 % (ref 0–1.9)
BASOPHILS NFR BLD: 0.1 % (ref 0–1.9)
BILIRUB SERPL-MCNC: 0.3 MG/DL (ref 0.1–1)
BLD PROD TYP BPU: NORMAL
BLOOD UNIT EXPIRATION DATE: NORMAL
BLOOD UNIT TYPE CODE: 6200
BLOOD UNIT TYPE: NORMAL
BUN SERPL-MCNC: 11 MG/DL (ref 8–23)
BUN SERPL-MCNC: 12 MG/DL (ref 8–23)
BUN SERPL-MCNC: 4 MG/DL (ref 8–23)
BUN SERPL-MCNC: 8 MG/DL (ref 8–23)
CA-I BLDV-SCNC: 1.1 MMOL/L (ref 1.06–1.42)
CALCIUM SERPL-MCNC: 8.3 MG/DL (ref 8.7–10.5)
CALCIUM SERPL-MCNC: 8.4 MG/DL (ref 8.7–10.5)
CALCIUM SERPL-MCNC: 8.8 MG/DL (ref 8.7–10.5)
CALCIUM SERPL-MCNC: 9 MG/DL (ref 8.7–10.5)
CHLORIDE SERPL-SCNC: 105 MMOL/L (ref 95–110)
CHLORIDE SERPL-SCNC: 106 MMOL/L (ref 95–110)
CHLORIDE SERPL-SCNC: 106 MMOL/L (ref 95–110)
CHLORIDE SERPL-SCNC: 107 MMOL/L (ref 95–110)
CO2 SERPL-SCNC: 19 MMOL/L (ref 23–29)
CO2 SERPL-SCNC: 21 MMOL/L (ref 23–29)
CODING SYSTEM: NORMAL
CREAT SERPL-MCNC: 0.6 MG/DL (ref 0.5–1.4)
CREAT SERPL-MCNC: 0.9 MG/DL (ref 0.5–1.4)
CREAT SERPL-MCNC: 1.1 MG/DL (ref 0.5–1.4)
CREAT SERPL-MCNC: 1.1 MG/DL (ref 0.5–1.4)
DELSYS: ABNORMAL
DIFFERENTIAL METHOD: ABNORMAL
DIFFERENTIAL METHOD: ABNORMAL
DISPENSE STATUS: NORMAL
EOSINOPHIL # BLD AUTO: 0 K/UL (ref 0–0.5)
EOSINOPHIL NFR BLD: 0 % (ref 0–8)
EOSINOPHIL NFR BLD: 0 % (ref 0–8)
ERYTHROCYTE [DISTWIDTH] IN BLOOD BY AUTOMATED COUNT: 15.9 % (ref 11.5–14.5)
ERYTHROCYTE [DISTWIDTH] IN BLOOD BY AUTOMATED COUNT: 16.2 % (ref 11.5–14.5)
ERYTHROCYTE [SEDIMENTATION RATE] IN BLOOD BY WESTERGREN METHOD: 14 MM/H
EST. GFR  (AFRICAN AMERICAN): 59.2 ML/MIN/1.73 M^2
EST. GFR  (AFRICAN AMERICAN): 59.2 ML/MIN/1.73 M^2
EST. GFR  (AFRICAN AMERICAN): >60 ML/MIN/1.73 M^2
EST. GFR  (AFRICAN AMERICAN): >60 ML/MIN/1.73 M^2
EST. GFR  (NON AFRICAN AMERICAN): 51.3 ML/MIN/1.73 M^2
EST. GFR  (NON AFRICAN AMERICAN): 51.3 ML/MIN/1.73 M^2
EST. GFR  (NON AFRICAN AMERICAN): >60 ML/MIN/1.73 M^2
EST. GFR  (NON AFRICAN AMERICAN): >60 ML/MIN/1.73 M^2
FIO2: 40
GLUCOSE SERPL-MCNC: 119 MG/DL (ref 70–110)
GLUCOSE SERPL-MCNC: 129 MG/DL (ref 70–110)
GLUCOSE SERPL-MCNC: 145 MG/DL (ref 70–110)
GLUCOSE SERPL-MCNC: 154 MG/DL (ref 70–110)
HCO3 UR-SCNC: 15.6 MMOL/L (ref 24–28)
HCT VFR BLD AUTO: 20.4 % (ref 37–48.5)
HCT VFR BLD AUTO: 22 % (ref 37–48.5)
HGB BLD-MCNC: 6.6 G/DL (ref 12–16)
HGB BLD-MCNC: 7.2 G/DL (ref 12–16)
IMM GRANULOCYTES # BLD AUTO: 0.24 K/UL (ref 0–0.04)
IMM GRANULOCYTES # BLD AUTO: ABNORMAL K/UL (ref 0–0.04)
IMM GRANULOCYTES NFR BLD AUTO: 2.3 % (ref 0–0.5)
IMM GRANULOCYTES NFR BLD AUTO: ABNORMAL % (ref 0–0.5)
LYMPHOCYTES # BLD AUTO: 1.4 K/UL (ref 1–4.8)
LYMPHOCYTES NFR BLD: 12 % (ref 18–48)
LYMPHOCYTES NFR BLD: 13.1 % (ref 18–48)
MAGNESIUM SERPL-MCNC: 1.8 MG/DL (ref 1.6–2.6)
MAGNESIUM SERPL-MCNC: 1.8 MG/DL (ref 1.6–2.6)
MAGNESIUM SERPL-MCNC: 2.3 MG/DL (ref 1.6–2.6)
MAGNESIUM SERPL-MCNC: 2.4 MG/DL (ref 1.6–2.6)
MCH RBC QN AUTO: 28.3 PG (ref 27–31)
MCH RBC QN AUTO: 28.6 PG (ref 27–31)
MCHC RBC AUTO-ENTMCNC: 32.4 G/DL (ref 32–36)
MCHC RBC AUTO-ENTMCNC: 32.7 G/DL (ref 32–36)
MCV RBC AUTO: 87 FL (ref 82–98)
MCV RBC AUTO: 88 FL (ref 82–98)
MIN VOL: 12.3
MODE: ABNORMAL
MONOCYTES # BLD AUTO: 1.7 K/UL (ref 0.3–1)
MONOCYTES NFR BLD: 16.4 % (ref 4–15)
MONOCYTES NFR BLD: 6 % (ref 4–15)
MYELOCYTES NFR BLD MANUAL: 1 %
NEUTROPHILS # BLD AUTO: 7.1 K/UL (ref 1.8–7.7)
NEUTROPHILS NFR BLD: 68.1 % (ref 38–73)
NEUTROPHILS NFR BLD: 81 % (ref 38–73)
NRBC BLD-RTO: 1 /100 WBC
NRBC BLD-RTO: 1 /100 WBC
PCO2 BLDA: 26.2 MMHG (ref 35–45)
PEEP: 5
PH SMN: 7.38 [PH] (ref 7.35–7.45)
PHOSPHATE SERPL-MCNC: 1.5 MG/DL (ref 2.7–4.5)
PHOSPHATE SERPL-MCNC: 1.6 MG/DL (ref 2.7–4.5)
PHOSPHATE SERPL-MCNC: 1.8 MG/DL (ref 2.7–4.5)
PHOSPHATE SERPL-MCNC: 2.3 MG/DL (ref 2.7–4.5)
PLATELET # BLD AUTO: 220 K/UL (ref 150–450)
PLATELET # BLD AUTO: 263 K/UL (ref 150–450)
PLATELET BLD QL SMEAR: ABNORMAL
PMV BLD AUTO: 9 FL (ref 9.2–12.9)
PMV BLD AUTO: 9.3 FL (ref 9.2–12.9)
PO2 BLDA: 201 MMHG (ref 80–100)
POC BE: -9 MMOL/L
POC SATURATED O2: 100 % (ref 95–100)
POC TCO2: 16 MMOL/L (ref 23–27)
POCT GLUCOSE: 168 MG/DL (ref 70–110)
POCT GLUCOSE: 183 MG/DL (ref 70–110)
POTASSIUM SERPL-SCNC: 4.2 MMOL/L (ref 3.5–5.1)
POTASSIUM SERPL-SCNC: 4.5 MMOL/L (ref 3.5–5.1)
POTASSIUM SERPL-SCNC: 4.8 MMOL/L (ref 3.5–5.1)
POTASSIUM SERPL-SCNC: 5 MMOL/L (ref 3.5–5.1)
PROT SERPL-MCNC: 4.6 G/DL (ref 6–8.4)
RBC # BLD AUTO: 2.33 M/UL (ref 4–5.4)
RBC # BLD AUTO: 2.52 M/UL (ref 4–5.4)
SAMPLE: ABNORMAL
SITE: ABNORMAL
SMUDGE CELLS BLD QL SMEAR: PRESENT
SODIUM SERPL-SCNC: 139 MMOL/L (ref 136–145)
SODIUM SERPL-SCNC: 140 MMOL/L (ref 136–145)
SODIUM SERPL-SCNC: 141 MMOL/L (ref 136–145)
SODIUM SERPL-SCNC: 145 MMOL/L (ref 136–145)
TRANS ERYTHROCYTES VOL PATIENT: NORMAL ML
TRIGL SERPL-MCNC: 286 MG/DL (ref 30–150)
VANCOMYCIN SERPL-MCNC: 13.7 UG/ML
VT: 1119
WBC # BLD AUTO: 10.48 K/UL (ref 3.9–12.7)
WBC # BLD AUTO: 11.27 K/UL (ref 3.9–12.7)

## 2021-11-30 PROCEDURE — 25000003 PHARM REV CODE 250: Performed by: NURSE PRACTITIONER

## 2021-11-30 PROCEDURE — 90945 DIALYSIS ONE EVALUATION: CPT

## 2021-11-30 PROCEDURE — 85027 COMPLETE CBC AUTOMATED: CPT | Performed by: NURSE PRACTITIONER

## 2021-11-30 PROCEDURE — 25000003 PHARM REV CODE 250: Performed by: INTERNAL MEDICINE

## 2021-11-30 PROCEDURE — 63600175 PHARM REV CODE 636 W HCPCS: Performed by: INTERNAL MEDICINE

## 2021-11-30 PROCEDURE — 63600175 PHARM REV CODE 636 W HCPCS: Performed by: NURSE PRACTITIONER

## 2021-11-30 PROCEDURE — 25000003 PHARM REV CODE 250: Performed by: CLINICAL NURSE SPECIALIST

## 2021-11-30 PROCEDURE — 85007 BL SMEAR W/DIFF WBC COUNT: CPT | Performed by: NURSE PRACTITIONER

## 2021-11-30 PROCEDURE — 94640 AIRWAY INHALATION TREATMENT: CPT

## 2021-11-30 PROCEDURE — 25000242 PHARM REV CODE 250 ALT 637 W/ HCPCS: Performed by: STUDENT IN AN ORGANIZED HEALTH CARE EDUCATION/TRAINING PROGRAM

## 2021-11-30 PROCEDURE — 99232 PR SUBSEQUENT HOSPITAL CARE,LEVL II: ICD-10-PCS | Mod: ,,, | Performed by: NURSE PRACTITIONER

## 2021-11-30 PROCEDURE — 27200966 HC CLOSED SUCTION SYSTEM

## 2021-11-30 PROCEDURE — 94003 VENT MGMT INPAT SUBQ DAY: CPT

## 2021-11-30 PROCEDURE — 63600175 PHARM REV CODE 636 W HCPCS: Performed by: STUDENT IN AN ORGANIZED HEALTH CARE EDUCATION/TRAINING PROGRAM

## 2021-11-30 PROCEDURE — 80202 ASSAY OF VANCOMYCIN: CPT | Performed by: INTERNAL MEDICINE

## 2021-11-30 PROCEDURE — 94761 N-INVAS EAR/PLS OXIMETRY MLT: CPT

## 2021-11-30 PROCEDURE — 85025 COMPLETE CBC W/AUTO DIFF WBC: CPT | Performed by: NURSE PRACTITIONER

## 2021-11-30 PROCEDURE — 97165 OT EVAL LOW COMPLEX 30 MIN: CPT

## 2021-11-30 PROCEDURE — 99900017 HC EXTUBATION W/PARAMETERS (STAT)

## 2021-11-30 PROCEDURE — 80069 RENAL FUNCTION PANEL: CPT | Mod: 91 | Performed by: NURSE PRACTITIONER

## 2021-11-30 PROCEDURE — 99291 CRITICAL CARE FIRST HOUR: CPT | Mod: ,,, | Performed by: CLINICAL NURSE SPECIALIST

## 2021-11-30 PROCEDURE — P9021 RED BLOOD CELLS UNIT: HCPCS | Performed by: INTERNAL MEDICINE

## 2021-11-30 PROCEDURE — 80100008 HC CRRT DAILY MAINTENANCE

## 2021-11-30 PROCEDURE — 84478 ASSAY OF TRIGLYCERIDES: CPT | Performed by: NURSE PRACTITIONER

## 2021-11-30 PROCEDURE — 25000003 PHARM REV CODE 250: Performed by: STUDENT IN AN ORGANIZED HEALTH CARE EDUCATION/TRAINING PROGRAM

## 2021-11-30 PROCEDURE — 83735 ASSAY OF MAGNESIUM: CPT | Mod: 91 | Performed by: NURSE PRACTITIONER

## 2021-11-30 PROCEDURE — 94150 VITAL CAPACITY TEST: CPT

## 2021-11-30 PROCEDURE — 99291 PR CRITICAL CARE, E/M 30-74 MINUTES: ICD-10-PCS | Mod: ,,, | Performed by: CLINICAL NURSE SPECIALIST

## 2021-11-30 PROCEDURE — 80069 RENAL FUNCTION PANEL: CPT | Performed by: STUDENT IN AN ORGANIZED HEALTH CARE EDUCATION/TRAINING PROGRAM

## 2021-11-30 PROCEDURE — 97162 PT EVAL MOD COMPLEX 30 MIN: CPT

## 2021-11-30 PROCEDURE — 83735 ASSAY OF MAGNESIUM: CPT | Performed by: NURSE PRACTITIONER

## 2021-11-30 PROCEDURE — 99232 SBSQ HOSP IP/OBS MODERATE 35: CPT | Mod: ,,, | Performed by: NURSE PRACTITIONER

## 2021-11-30 PROCEDURE — 99900035 HC TECH TIME PER 15 MIN (STAT)

## 2021-11-30 PROCEDURE — 80053 COMPREHEN METABOLIC PANEL: CPT | Performed by: NURSE PRACTITIONER

## 2021-11-30 PROCEDURE — 25000003 PHARM REV CODE 250

## 2021-11-30 PROCEDURE — 94010 BREATHING CAPACITY TEST: CPT

## 2021-11-30 PROCEDURE — 82330 ASSAY OF CALCIUM: CPT | Mod: 91 | Performed by: NURSE PRACTITIONER

## 2021-11-30 PROCEDURE — 84100 ASSAY OF PHOSPHORUS: CPT | Performed by: NURSE PRACTITIONER

## 2021-11-30 PROCEDURE — 63600175 PHARM REV CODE 636 W HCPCS: Mod: JG | Performed by: STUDENT IN AN ORGANIZED HEALTH CARE EDUCATION/TRAINING PROGRAM

## 2021-11-30 PROCEDURE — 20000000 HC ICU ROOM

## 2021-11-30 PROCEDURE — 27000221 HC OXYGEN, UP TO 24 HOURS

## 2021-11-30 PROCEDURE — 85730 THROMBOPLASTIN TIME PARTIAL: CPT | Performed by: STUDENT IN AN ORGANIZED HEALTH CARE EDUCATION/TRAINING PROGRAM

## 2021-11-30 RX ORDER — ISOSORBIDE MONONITRATE 60 MG/1
120 TABLET, EXTENDED RELEASE ORAL DAILY
Status: DISCONTINUED | OUTPATIENT
Start: 2021-12-01 | End: 2021-11-30

## 2021-11-30 RX ORDER — AMLODIPINE BESYLATE 10 MG/1
10 TABLET ORAL DAILY
Status: DISCONTINUED | OUTPATIENT
Start: 2021-11-30 | End: 2021-12-03

## 2021-11-30 RX ORDER — ISOSORBIDE MONONITRATE 60 MG/1
120 TABLET, EXTENDED RELEASE ORAL DAILY
Status: DISCONTINUED | OUTPATIENT
Start: 2021-11-30 | End: 2021-12-01

## 2021-11-30 RX ORDER — LABETALOL HCL 20 MG/4 ML
SYRINGE (ML) INTRAVENOUS
Status: COMPLETED
Start: 2021-11-30 | End: 2021-11-30

## 2021-11-30 RX ORDER — LABETALOL HCL 20 MG/4 ML
10 SYRINGE (ML) INTRAVENOUS ONCE
Status: COMPLETED | OUTPATIENT
Start: 2021-11-30 | End: 2021-11-30

## 2021-11-30 RX ORDER — CEFEPIME HYDROCHLORIDE 1 G/1
1 INJECTION, POWDER, FOR SOLUTION INTRAMUSCULAR; INTRAVENOUS
Status: DISCONTINUED | OUTPATIENT
Start: 2021-11-30 | End: 2021-12-01

## 2021-11-30 RX ORDER — MAGNESIUM SULFATE HEPTAHYDRATE 40 MG/ML
2 INJECTION, SOLUTION INTRAVENOUS
Status: DISPENSED | OUTPATIENT
Start: 2021-11-30 | End: 2021-12-01

## 2021-11-30 RX ORDER — AMLODIPINE BESYLATE 10 MG/1
10 TABLET ORAL DAILY
Status: DISCONTINUED | OUTPATIENT
Start: 2021-11-30 | End: 2021-11-30

## 2021-11-30 RX ORDER — METOPROLOL TARTRATE 1 MG/ML
5 INJECTION, SOLUTION INTRAVENOUS ONCE
Status: COMPLETED | OUTPATIENT
Start: 2021-11-30 | End: 2021-11-30

## 2021-11-30 RX ORDER — LABETALOL HCL 20 MG/4 ML
10 SYRINGE (ML) INTRAVENOUS ONCE
Status: DISCONTINUED | OUTPATIENT
Start: 2021-11-30 | End: 2021-11-30

## 2021-11-30 RX ORDER — HYDROCODONE BITARTRATE AND ACETAMINOPHEN 500; 5 MG/1; MG/1
TABLET ORAL
Status: DISCONTINUED | OUTPATIENT
Start: 2021-11-30 | End: 2021-12-17

## 2021-11-30 RX ADMIN — DEXTROSE MONOHYDRATE, SODIUM CITRATE, AND CITRIC ACID MONOHYDRATE: 2.45; 2.2; .8 INJECTION, SOLUTION INTRAVENOUS at 12:11

## 2021-11-30 RX ADMIN — CEFEPIME 1 G: 1 INJECTION, POWDER, FOR SOLUTION INTRAMUSCULAR; INTRAVENOUS at 10:11

## 2021-11-30 RX ADMIN — AMIODARONE HYDROCHLORIDE 0.5 MG/MIN: 1.8 INJECTION, SOLUTION INTRAVENOUS at 08:11

## 2021-11-30 RX ADMIN — IPRATROPIUM BROMIDE AND ALBUTEROL SULFATE 3 ML: 2.5; .5 SOLUTION RESPIRATORY (INHALATION) at 08:11

## 2021-11-30 RX ADMIN — CEFEPIME 1 G: 1 INJECTION, POWDER, FOR SOLUTION INTRAMUSCULAR; INTRAVENOUS at 11:11

## 2021-11-30 RX ADMIN — MUPIROCIN: 20 OINTMENT TOPICAL at 08:11

## 2021-11-30 RX ADMIN — IPRATROPIUM BROMIDE AND ALBUTEROL SULFATE 3 ML: 2.5; .5 SOLUTION RESPIRATORY (INHALATION) at 11:11

## 2021-11-30 RX ADMIN — SODIUM PHOSPHATE, MONOBASIC, MONOHYDRATE 30 MMOL: 276; 142 INJECTION, SOLUTION INTRAVENOUS at 05:11

## 2021-11-30 RX ADMIN — PROPOFOL 15 MCG/KG/MIN: 10 INJECTION, EMULSION INTRAVENOUS at 01:11

## 2021-11-30 RX ADMIN — SODIUM CHLORIDE: 0.9 INJECTION, SOLUTION INTRAVENOUS at 03:11

## 2021-11-30 RX ADMIN — FAMOTIDINE 20 MG: 10 INJECTION, SOLUTION INTRAVENOUS at 08:11

## 2021-11-30 RX ADMIN — ALTEPLASE 2 MG: 2.2 INJECTION, POWDER, LYOPHILIZED, FOR SOLUTION INTRAVENOUS at 10:11

## 2021-11-30 RX ADMIN — CHLORHEXIDINE GLUCONATE 0.12% ORAL RINSE 15 ML: 1.2 LIQUID ORAL at 08:11

## 2021-11-30 RX ADMIN — IPRATROPIUM BROMIDE AND ALBUTEROL SULFATE 3 ML: 2.5; .5 SOLUTION RESPIRATORY (INHALATION) at 04:11

## 2021-11-30 RX ADMIN — LABETALOL HYDROCHLORIDE 10 MG: 5 INJECTION, SOLUTION INTRAVENOUS at 11:11

## 2021-11-30 RX ADMIN — IPRATROPIUM BROMIDE AND ALBUTEROL SULFATE 3 ML: 2.5; .5 SOLUTION RESPIRATORY (INHALATION) at 07:11

## 2021-11-30 RX ADMIN — MAGNESIUM SULFATE 2 G: 2 INJECTION INTRAVENOUS at 12:11

## 2021-11-30 RX ADMIN — Medication 10 MG: at 11:11

## 2021-11-30 RX ADMIN — PROPOFOL 20 MCG/KG/MIN: 10 INJECTION, EMULSION INTRAVENOUS at 07:11

## 2021-11-30 RX ADMIN — CALCIUM GLUCONATE: 98 INJECTION, SOLUTION INTRAVENOUS at 10:11

## 2021-11-30 RX ADMIN — CALCIUM GLUCONATE: 98 INJECTION, SOLUTION INTRAVENOUS at 12:11

## 2021-11-30 RX ADMIN — ASPIRIN 300 MG: 300 SUPPOSITORY RECTAL at 08:11

## 2021-11-30 RX ADMIN — METOROPROLOL TARTRATE 5 MG: 5 INJECTION, SOLUTION INTRAVENOUS at 01:11

## 2021-11-30 RX ADMIN — ALTEPLASE 2 MG: 2.2 INJECTION, POWDER, LYOPHILIZED, FOR SOLUTION INTRAVENOUS at 08:11

## 2021-12-01 PROBLEM — Z71.89 COUNSELING REGARDING ADVANCED CARE PLANNING AND GOALS OF CARE: Status: ACTIVE | Noted: 2021-12-01

## 2021-12-01 PROBLEM — Z51.5 ENCOUNTER FOR PALLIATIVE CARE: Status: ACTIVE | Noted: 2021-12-01

## 2021-12-01 PROBLEM — R53.81 DEBILITY: Status: ACTIVE | Noted: 2021-12-01

## 2021-12-01 LAB
ACETONE SERPL-MCNC: NOT DETECTED MG/DL
ALBUMIN SERPL BCP-MCNC: 1.9 G/DL (ref 3.5–5.2)
ALP SERPL-CCNC: 124 U/L (ref 55–135)
ALT SERPL W/O P-5'-P-CCNC: 22 U/L (ref 10–44)
ANION GAP SERPL CALC-SCNC: 16 MMOL/L (ref 8–16)
AST SERPL-CCNC: 28 U/L (ref 10–40)
BACTERIA BLD CULT: ABNORMAL
BASOPHILS # BLD AUTO: 0.02 K/UL (ref 0–0.2)
BASOPHILS # BLD AUTO: 0.04 K/UL (ref 0–0.2)
BASOPHILS NFR BLD: 0.2 % (ref 0–1.9)
BASOPHILS NFR BLD: 0.4 % (ref 0–1.9)
BILIRUB SERPL-MCNC: 0.4 MG/DL (ref 0.1–1)
BUN SERPL-MCNC: <3 MG/DL (ref 8–23)
CA-I BLDV-SCNC: 1.19 MMOL/L (ref 1.06–1.42)
CALCIUM SERPL-MCNC: 10.3 MG/DL (ref 8.7–10.5)
CHLORIDE SERPL-SCNC: 105 MMOL/L (ref 95–110)
CO2 SERPL-SCNC: 21 MMOL/L (ref 23–29)
CREAT SERPL-MCNC: 0.5 MG/DL (ref 0.5–1.4)
DIFFERENTIAL METHOD: ABNORMAL
DIFFERENTIAL METHOD: ABNORMAL
EOSINOPHIL # BLD AUTO: 0 K/UL (ref 0–0.5)
EOSINOPHIL # BLD AUTO: 0 K/UL (ref 0–0.5)
EOSINOPHIL NFR BLD: 0.2 % (ref 0–8)
EOSINOPHIL NFR BLD: 0.2 % (ref 0–8)
ERYTHROCYTE [DISTWIDTH] IN BLOOD BY AUTOMATED COUNT: 16.8 % (ref 11.5–14.5)
ERYTHROCYTE [DISTWIDTH] IN BLOOD BY AUTOMATED COUNT: 16.9 % (ref 11.5–14.5)
EST. GFR  (AFRICAN AMERICAN): >60 ML/MIN/1.73 M^2
EST. GFR  (NON AFRICAN AMERICAN): >60 ML/MIN/1.73 M^2
ETHANOL SERPL-MCNC: NOT DETECTED MG/DL
GLUCOSE SERPL-MCNC: 162 MG/DL (ref 70–110)
HCT VFR BLD AUTO: 22.3 % (ref 37–48.5)
HCT VFR BLD AUTO: 22.5 % (ref 37–48.5)
HGB BLD-MCNC: 7.1 G/DL (ref 12–16)
HGB BLD-MCNC: 7.2 G/DL (ref 12–16)
IMM GRANULOCYTES # BLD AUTO: 0.42 K/UL (ref 0–0.04)
IMM GRANULOCYTES # BLD AUTO: 0.44 K/UL (ref 0–0.04)
IMM GRANULOCYTES NFR BLD AUTO: 4.1 % (ref 0–0.5)
IMM GRANULOCYTES NFR BLD AUTO: 4.2 % (ref 0–0.5)
ISOPROPANOL SERPL-MCNC: NOT DETECTED MG/DL
LYMPHOCYTES # BLD AUTO: 1.2 K/UL (ref 1–4.8)
LYMPHOCYTES # BLD AUTO: 1.4 K/UL (ref 1–4.8)
LYMPHOCYTES NFR BLD: 11.6 % (ref 18–48)
LYMPHOCYTES NFR BLD: 13.7 % (ref 18–48)
MAGNESIUM SERPL-MCNC: 2.4 MG/DL (ref 1.6–2.6)
MCH RBC QN AUTO: 27.8 PG (ref 27–31)
MCH RBC QN AUTO: 28.1 PG (ref 27–31)
MCHC RBC AUTO-ENTMCNC: 31.8 G/DL (ref 32–36)
MCHC RBC AUTO-ENTMCNC: 32 G/DL (ref 32–36)
MCV RBC AUTO: 87 FL (ref 82–98)
MCV RBC AUTO: 88 FL (ref 82–98)
METHANOL SERPL-MCNC: NOT DETECTED MG/DL
MONOCYTES # BLD AUTO: 1.4 K/UL (ref 0.3–1)
MONOCYTES # BLD AUTO: 1.8 K/UL (ref 0.3–1)
MONOCYTES NFR BLD: 13.6 % (ref 4–15)
MONOCYTES NFR BLD: 16.7 % (ref 4–15)
NEUTROPHILS # BLD AUTO: 7 K/UL (ref 1.8–7.7)
NEUTROPHILS # BLD AUTO: 7.1 K/UL (ref 1.8–7.7)
NEUTROPHILS NFR BLD: 66.9 % (ref 38–73)
NEUTROPHILS NFR BLD: 68.2 % (ref 38–73)
NRBC BLD-RTO: 1 /100 WBC
NRBC BLD-RTO: 1 /100 WBC
PHOSPHATE SERPL-MCNC: 1.2 MG/DL (ref 2.7–4.5)
PLATELET # BLD AUTO: 182 K/UL (ref 150–450)
PLATELET # BLD AUTO: 219 K/UL (ref 150–450)
PMV BLD AUTO: 9.2 FL (ref 9.2–12.9)
PMV BLD AUTO: 9.3 FL (ref 9.2–12.9)
POCT GLUCOSE: 153 MG/DL (ref 70–110)
POCT GLUCOSE: 171 MG/DL (ref 70–110)
POCT GLUCOSE: 175 MG/DL (ref 70–110)
POCT GLUCOSE: 188 MG/DL (ref 70–110)
POCT GLUCOSE: 205 MG/DL (ref 70–110)
POCT GLUCOSE: 222 MG/DL (ref 70–110)
POTASSIUM SERPL-SCNC: 3.8 MMOL/L (ref 3.5–5.1)
PROT SERPL-MCNC: 4.7 G/DL (ref 6–8.4)
RBC # BLD AUTO: 2.53 M/UL (ref 4–5.4)
RBC # BLD AUTO: 2.59 M/UL (ref 4–5.4)
SODIUM SERPL-SCNC: 142 MMOL/L (ref 136–145)
VOLATILE SCREEN SERUM, CHAIN OF CUSTODY: NORMAL
VOLATILES SERPL: NORMAL
WBC # BLD AUTO: 10.33 K/UL (ref 3.9–12.7)
WBC # BLD AUTO: 10.54 K/UL (ref 3.9–12.7)

## 2021-12-01 PROCEDURE — 20000000 HC ICU ROOM

## 2021-12-01 PROCEDURE — 25000003 PHARM REV CODE 250: Performed by: STUDENT IN AN ORGANIZED HEALTH CARE EDUCATION/TRAINING PROGRAM

## 2021-12-01 PROCEDURE — 80100008 HC CRRT DAILY MAINTENANCE

## 2021-12-01 PROCEDURE — 25000003 PHARM REV CODE 250: Performed by: CLINICAL NURSE SPECIALIST

## 2021-12-01 PROCEDURE — 99497 ADVNCD CARE PLAN 30 MIN: CPT | Mod: 25,,, | Performed by: NURSE PRACTITIONER

## 2021-12-01 PROCEDURE — 99900035 HC TECH TIME PER 15 MIN (STAT)

## 2021-12-01 PROCEDURE — 25000003 PHARM REV CODE 250: Performed by: INTERNAL MEDICINE

## 2021-12-01 PROCEDURE — 63600175 PHARM REV CODE 636 W HCPCS: Performed by: CLINICAL NURSE SPECIALIST

## 2021-12-01 PROCEDURE — 25000242 PHARM REV CODE 250 ALT 637 W/ HCPCS: Performed by: STUDENT IN AN ORGANIZED HEALTH CARE EDUCATION/TRAINING PROGRAM

## 2021-12-01 PROCEDURE — 63600175 PHARM REV CODE 636 W HCPCS: Performed by: STUDENT IN AN ORGANIZED HEALTH CARE EDUCATION/TRAINING PROGRAM

## 2021-12-01 PROCEDURE — 63600175 PHARM REV CODE 636 W HCPCS: Performed by: PHYSICIAN ASSISTANT

## 2021-12-01 PROCEDURE — 63600175 PHARM REV CODE 636 W HCPCS: Performed by: NURSE PRACTITIONER

## 2021-12-01 PROCEDURE — 85025 COMPLETE CBC W/AUTO DIFF WBC: CPT | Mod: 91 | Performed by: NURSE PRACTITIONER

## 2021-12-01 PROCEDURE — 99291 PR CRITICAL CARE, E/M 30-74 MINUTES: ICD-10-PCS | Mod: ,,, | Performed by: CLINICAL NURSE SPECIALIST

## 2021-12-01 PROCEDURE — 94761 N-INVAS EAR/PLS OXIMETRY MLT: CPT

## 2021-12-01 PROCEDURE — 84100 ASSAY OF PHOSPHORUS: CPT | Performed by: NURSE PRACTITIONER

## 2021-12-01 PROCEDURE — 94640 AIRWAY INHALATION TREATMENT: CPT

## 2021-12-01 PROCEDURE — 99223 1ST HOSP IP/OBS HIGH 75: CPT | Mod: ,,, | Performed by: NURSE PRACTITIONER

## 2021-12-01 PROCEDURE — 25000003 PHARM REV CODE 250

## 2021-12-01 PROCEDURE — 97535 SELF CARE MNGMENT TRAINING: CPT

## 2021-12-01 PROCEDURE — 83735 ASSAY OF MAGNESIUM: CPT | Performed by: NURSE PRACTITIONER

## 2021-12-01 PROCEDURE — 63600175 PHARM REV CODE 636 W HCPCS: Performed by: INTERNAL MEDICINE

## 2021-12-01 PROCEDURE — 99291 CRITICAL CARE FIRST HOUR: CPT | Mod: ,,, | Performed by: CLINICAL NURSE SPECIALIST

## 2021-12-01 PROCEDURE — 90945 DIALYSIS ONE EVALUATION: CPT

## 2021-12-01 PROCEDURE — 99497 PR ADVNCD CARE PLAN 30 MIN: ICD-10-PCS | Mod: 25,,, | Performed by: NURSE PRACTITIONER

## 2021-12-01 PROCEDURE — 80053 COMPREHEN METABOLIC PANEL: CPT | Performed by: NURSE PRACTITIONER

## 2021-12-01 PROCEDURE — 92610 EVALUATE SWALLOWING FUNCTION: CPT

## 2021-12-01 PROCEDURE — 99233 SBSQ HOSP IP/OBS HIGH 50: CPT | Mod: ,,, | Performed by: NURSE PRACTITIONER

## 2021-12-01 PROCEDURE — 99223 PR INITIAL HOSPITAL CARE,LEVL III: ICD-10-PCS | Mod: ,,, | Performed by: NURSE PRACTITIONER

## 2021-12-01 PROCEDURE — 27000221 HC OXYGEN, UP TO 24 HOURS

## 2021-12-01 PROCEDURE — 99233 PR SUBSEQUENT HOSPITAL CARE,LEVL III: ICD-10-PCS | Mod: ,,, | Performed by: NURSE PRACTITIONER

## 2021-12-01 RX ORDER — DILTIAZEM HYDROCHLORIDE 5 MG/ML
INJECTION INTRAVENOUS
Status: COMPLETED
Start: 2021-12-01 | End: 2021-12-01

## 2021-12-01 RX ORDER — HYDRALAZINE HYDROCHLORIDE 20 MG/ML
INJECTION INTRAMUSCULAR; INTRAVENOUS
Status: DISPENSED
Start: 2021-12-01 | End: 2021-12-01

## 2021-12-01 RX ORDER — SODIUM CHLORIDE 9 MG/ML
INJECTION, SOLUTION INTRAVENOUS
Status: DISCONTINUED | OUTPATIENT
Start: 2021-12-01 | End: 2021-12-30 | Stop reason: HOSPADM

## 2021-12-01 RX ORDER — HYDRALAZINE HYDROCHLORIDE 20 MG/ML
10 INJECTION INTRAMUSCULAR; INTRAVENOUS EVERY 6 HOURS PRN
Status: DISCONTINUED | OUTPATIENT
Start: 2021-12-01 | End: 2021-12-30 | Stop reason: HOSPADM

## 2021-12-01 RX ORDER — ATORVASTATIN CALCIUM 20 MG/1
80 TABLET, FILM COATED ORAL DAILY
Status: DISCONTINUED | OUTPATIENT
Start: 2021-12-02 | End: 2021-12-30 | Stop reason: HOSPADM

## 2021-12-01 RX ORDER — NAPROXEN SODIUM 220 MG/1
81 TABLET, FILM COATED ORAL DAILY
Status: DISCONTINUED | OUTPATIENT
Start: 2021-12-01 | End: 2021-12-21

## 2021-12-01 RX ORDER — DILTIAZEM HYDROCHLORIDE 5 MG/ML
10 INJECTION INTRAVENOUS ONCE
Status: COMPLETED | OUTPATIENT
Start: 2021-12-01 | End: 2021-12-01

## 2021-12-01 RX ORDER — CEFEPIME HYDROCHLORIDE 1 G/1
1 INJECTION, POWDER, FOR SOLUTION INTRAMUSCULAR; INTRAVENOUS
Status: COMPLETED | OUTPATIENT
Start: 2021-12-02 | End: 2021-12-05

## 2021-12-01 RX ORDER — PROPRANOLOL HYDROCHLORIDE 20 MG/1
40 TABLET ORAL 2 TIMES DAILY
Status: DISCONTINUED | OUTPATIENT
Start: 2021-12-01 | End: 2021-12-02

## 2021-12-01 RX ORDER — HEPARIN SODIUM 5000 [USP'U]/ML
5000 INJECTION, SOLUTION INTRAVENOUS; SUBCUTANEOUS EVERY 8 HOURS
Status: DISCONTINUED | OUTPATIENT
Start: 2021-12-01 | End: 2021-12-10

## 2021-12-01 RX ORDER — PROPRANOLOL HYDROCHLORIDE 20 MG/1
40 TABLET ORAL 2 TIMES DAILY
Status: DISCONTINUED | OUTPATIENT
Start: 2021-12-01 | End: 2021-12-01

## 2021-12-01 RX ORDER — ANASTROZOLE 1 MG/1
1 TABLET ORAL DAILY
Status: DISCONTINUED | OUTPATIENT
Start: 2021-12-02 | End: 2021-12-30 | Stop reason: HOSPADM

## 2021-12-01 RX ADMIN — IPRATROPIUM BROMIDE AND ALBUTEROL SULFATE 3 ML: 2.5; .5 SOLUTION RESPIRATORY (INHALATION) at 01:12

## 2021-12-01 RX ADMIN — INSULIN ASPART 1 UNITS: 100 INJECTION, SOLUTION INTRAVENOUS; SUBCUTANEOUS at 12:12

## 2021-12-01 RX ADMIN — INSULIN ASPART 2 UNITS: 100 INJECTION, SOLUTION INTRAVENOUS; SUBCUTANEOUS at 10:12

## 2021-12-01 RX ADMIN — AMLODIPINE BESYLATE 10 MG: 10 TABLET ORAL at 01:12

## 2021-12-01 RX ADMIN — PROPRANOLOL HYDROCHLORIDE 40 MG: 20 TABLET ORAL at 03:12

## 2021-12-01 RX ADMIN — FAMOTIDINE 20 MG: 10 INJECTION, SOLUTION INTRAVENOUS at 11:12

## 2021-12-01 RX ADMIN — INSULIN ASPART 4 UNITS: 100 INJECTION, SOLUTION INTRAVENOUS; SUBCUTANEOUS at 12:12

## 2021-12-01 RX ADMIN — DILTIAZEM HYDROCHLORIDE 10 MG: 5 INJECTION INTRAVENOUS at 09:12

## 2021-12-01 RX ADMIN — MUPIROCIN: 20 OINTMENT TOPICAL at 11:12

## 2021-12-01 RX ADMIN — HYDRALAZINE HYDROCHLORIDE 10 MG: 20 INJECTION, SOLUTION INTRAMUSCULAR; INTRAVENOUS at 05:12

## 2021-12-01 RX ADMIN — IPRATROPIUM BROMIDE AND ALBUTEROL SULFATE 3 ML: 2.5; .5 SOLUTION RESPIRATORY (INHALATION) at 07:12

## 2021-12-01 RX ADMIN — ASPIRIN 81 MG CHEWABLE TABLET 81 MG: 81 TABLET CHEWABLE at 01:12

## 2021-12-01 RX ADMIN — METOROPROLOL TARTRATE 5 MG: 5 INJECTION, SOLUTION INTRAVENOUS at 05:12

## 2021-12-01 RX ADMIN — CHLOROTHIAZIDE SODIUM 500 MG: 500 INJECTION, POWDER, LYOPHILIZED, FOR SOLUTION INTRAVENOUS at 04:12

## 2021-12-01 RX ADMIN — DILTIAZEM HYDROCHLORIDE 10 MG: 5 INJECTION INTRAVENOUS at 05:12

## 2021-12-01 RX ADMIN — METOROPROLOL TARTRATE 5 MG: 5 INJECTION, SOLUTION INTRAVENOUS at 08:12

## 2021-12-01 RX ADMIN — AMIODARONE HYDROCHLORIDE 0.5 MG/MIN: 1.8 INJECTION, SOLUTION INTRAVENOUS at 07:12

## 2021-12-01 RX ADMIN — IPRATROPIUM BROMIDE AND ALBUTEROL SULFATE 3 ML: 2.5; .5 SOLUTION RESPIRATORY (INHALATION) at 03:12

## 2021-12-01 RX ADMIN — DIBASIC SODIUM PHOSPHATE, MONOBASIC POTASSIUM PHOSPHATE AND MONOBASIC SODIUM PHOSPHATE 2 TABLET: 852; 155; 130 TABLET ORAL at 06:12

## 2021-12-01 RX ADMIN — MUPIROCIN: 20 OINTMENT TOPICAL at 09:12

## 2021-12-01 RX ADMIN — ATORVASTATIN CALCIUM 80 MG: 20 TABLET, FILM COATED ORAL at 01:12

## 2021-12-01 RX ADMIN — HEPARIN SODIUM 5000 UNITS: 5000 INJECTION INTRAVENOUS; SUBCUTANEOUS at 03:12

## 2021-12-01 RX ADMIN — ANASTROZOLE 1 MG: 1 TABLET, COATED ORAL at 01:12

## 2021-12-01 RX ADMIN — HYDRALAZINE HYDROCHLORIDE 10 MG: 20 INJECTION, SOLUTION INTRAMUSCULAR; INTRAVENOUS at 01:12

## 2021-12-01 RX ADMIN — HEPARIN SODIUM 5000 UNITS: 5000 INJECTION INTRAVENOUS; SUBCUTANEOUS at 10:12

## 2021-12-01 RX ADMIN — AMIODARONE HYDROCHLORIDE 1 MG/MIN: 1.8 INJECTION, SOLUTION INTRAVENOUS at 02:12

## 2021-12-01 RX ADMIN — ISOSORBIDE MONONITRATE 120 MG: 60 TABLET, EXTENDED RELEASE ORAL at 01:12

## 2021-12-01 RX ADMIN — IPRATROPIUM BROMIDE AND ALBUTEROL SULFATE 3 ML: 2.5; .5 SOLUTION RESPIRATORY (INHALATION) at 08:12

## 2021-12-01 RX ADMIN — SODIUM CHLORIDE: 0.9 INJECTION, SOLUTION INTRAVENOUS at 05:12

## 2021-12-01 RX ADMIN — DIBASIC SODIUM PHOSPHATE, MONOBASIC POTASSIUM PHOSPHATE AND MONOBASIC SODIUM PHOSPHATE 2 TABLET: 852; 155; 130 TABLET ORAL at 01:12

## 2021-12-01 RX ADMIN — HYDRALAZINE HYDROCHLORIDE 10 MG: 20 INJECTION, SOLUTION INTRAMUSCULAR; INTRAVENOUS at 07:12

## 2021-12-01 RX ADMIN — FUROSEMIDE 200 MG: 10 INJECTION, SOLUTION INTRAMUSCULAR; INTRAVENOUS at 05:12

## 2021-12-01 RX ADMIN — INSULIN ASPART 2 UNITS: 100 INJECTION, SOLUTION INTRAVENOUS; SUBCUTANEOUS at 06:12

## 2021-12-01 RX ADMIN — CEFEPIME 1 G: 1 INJECTION, POWDER, FOR SOLUTION INTRAMUSCULAR; INTRAVENOUS at 11:12

## 2021-12-02 LAB
ALBUMIN SERPL BCP-MCNC: 1.9 G/DL (ref 3.5–5.2)
ALP SERPL-CCNC: 115 U/L (ref 55–135)
ALT SERPL W/O P-5'-P-CCNC: 20 U/L (ref 10–44)
ANION GAP SERPL CALC-SCNC: 13 MMOL/L (ref 8–16)
ANISOCYTOSIS BLD QL SMEAR: SLIGHT
ANISOCYTOSIS BLD QL SMEAR: SLIGHT
AST SERPL-CCNC: 19 U/L (ref 10–40)
BASOPHILS # BLD AUTO: ABNORMAL K/UL (ref 0–0.2)
BASOPHILS # BLD AUTO: ABNORMAL K/UL (ref 0–0.2)
BASOPHILS NFR BLD: 0 % (ref 0–1.9)
BASOPHILS NFR BLD: 0 % (ref 0–1.9)
BILIRUB SERPL-MCNC: 0.3 MG/DL (ref 0.1–1)
BUN SERPL-MCNC: 10 MG/DL (ref 8–23)
CALCIUM SERPL-MCNC: 9.7 MG/DL (ref 8.7–10.5)
CHLORIDE SERPL-SCNC: 107 MMOL/L (ref 95–110)
CO2 SERPL-SCNC: 22 MMOL/L (ref 23–29)
CREAT SERPL-MCNC: 1.1 MG/DL (ref 0.5–1.4)
DIFFERENTIAL METHOD: ABNORMAL
DIFFERENTIAL METHOD: ABNORMAL
EOSINOPHIL # BLD AUTO: ABNORMAL K/UL (ref 0–0.5)
EOSINOPHIL # BLD AUTO: ABNORMAL K/UL (ref 0–0.5)
EOSINOPHIL NFR BLD: 0 % (ref 0–8)
EOSINOPHIL NFR BLD: 0 % (ref 0–8)
ERYTHROCYTE [DISTWIDTH] IN BLOOD BY AUTOMATED COUNT: 16.8 % (ref 11.5–14.5)
ERYTHROCYTE [DISTWIDTH] IN BLOOD BY AUTOMATED COUNT: 17.2 % (ref 11.5–14.5)
EST. GFR  (AFRICAN AMERICAN): 59.2 ML/MIN/1.73 M^2
EST. GFR  (NON AFRICAN AMERICAN): 51.3 ML/MIN/1.73 M^2
GLUCOSE SERPL-MCNC: 204 MG/DL (ref 70–110)
HCT VFR BLD AUTO: 22.3 % (ref 37–48.5)
HCT VFR BLD AUTO: 22.4 % (ref 37–48.5)
HGB BLD-MCNC: 6.9 G/DL (ref 12–16)
HGB BLD-MCNC: 7.2 G/DL (ref 12–16)
HYPOCHROMIA BLD QL SMEAR: ABNORMAL
HYPOCHROMIA BLD QL SMEAR: ABNORMAL
IMM GRANULOCYTES # BLD AUTO: ABNORMAL K/UL (ref 0–0.04)
IMM GRANULOCYTES # BLD AUTO: ABNORMAL K/UL (ref 0–0.04)
IMM GRANULOCYTES NFR BLD AUTO: ABNORMAL % (ref 0–0.5)
IMM GRANULOCYTES NFR BLD AUTO: ABNORMAL % (ref 0–0.5)
LYMPHOCYTES # BLD AUTO: ABNORMAL K/UL (ref 1–4.8)
LYMPHOCYTES # BLD AUTO: ABNORMAL K/UL (ref 1–4.8)
LYMPHOCYTES NFR BLD: 10 % (ref 18–48)
LYMPHOCYTES NFR BLD: 8 % (ref 18–48)
MAGNESIUM SERPL-MCNC: 2.3 MG/DL (ref 1.6–2.6)
MCH RBC QN AUTO: 27.5 PG (ref 27–31)
MCH RBC QN AUTO: 28.3 PG (ref 27–31)
MCHC RBC AUTO-ENTMCNC: 30.9 G/DL (ref 32–36)
MCHC RBC AUTO-ENTMCNC: 32.1 G/DL (ref 32–36)
MCV RBC AUTO: 88 FL (ref 82–98)
MCV RBC AUTO: 89 FL (ref 82–98)
METAMYELOCYTES NFR BLD MANUAL: 1 %
METAMYELOCYTES NFR BLD MANUAL: 1 %
MONOCYTES # BLD AUTO: ABNORMAL K/UL (ref 0.3–1)
MONOCYTES # BLD AUTO: ABNORMAL K/UL (ref 0.3–1)
MONOCYTES NFR BLD: 1 % (ref 4–15)
MONOCYTES NFR BLD: 4 % (ref 4–15)
MYELOCYTES NFR BLD MANUAL: 1 %
NEUTROPHILS NFR BLD: 83 % (ref 38–73)
NEUTROPHILS NFR BLD: 89 % (ref 38–73)
NEUTS BAND NFR BLD MANUAL: 1 %
NRBC BLD-RTO: 1 /100 WBC
NRBC BLD-RTO: 1 /100 WBC
OVALOCYTES BLD QL SMEAR: ABNORMAL
PHOSPHATE SERPL-MCNC: 2.2 MG/DL (ref 2.7–4.5)
PLATELET # BLD AUTO: 186 K/UL (ref 150–450)
PLATELET # BLD AUTO: 226 K/UL (ref 150–450)
PLATELET BLD QL SMEAR: ABNORMAL
PLATELET BLD QL SMEAR: ABNORMAL
PMV BLD AUTO: 9.5 FL (ref 9.2–12.9)
PMV BLD AUTO: 9.6 FL (ref 9.2–12.9)
POCT GLUCOSE: 197 MG/DL (ref 70–110)
POCT GLUCOSE: 219 MG/DL (ref 70–110)
POCT GLUCOSE: 223 MG/DL (ref 70–110)
POCT GLUCOSE: 223 MG/DL (ref 70–110)
POCT GLUCOSE: 226 MG/DL (ref 70–110)
POIKILOCYTOSIS BLD QL SMEAR: SLIGHT
POIKILOCYTOSIS BLD QL SMEAR: SLIGHT
POLYCHROMASIA BLD QL SMEAR: ABNORMAL
POLYCHROMASIA BLD QL SMEAR: ABNORMAL
POTASSIUM SERPL-SCNC: 4 MMOL/L (ref 3.5–5.1)
PROMYELOCYTES NFR BLD MANUAL: 1 %
PROT SERPL-MCNC: 5 G/DL (ref 6–8.4)
RBC # BLD AUTO: 2.51 M/UL (ref 4–5.4)
RBC # BLD AUTO: 2.54 M/UL (ref 4–5.4)
SMUDGE CELLS BLD QL SMEAR: PRESENT
SODIUM SERPL-SCNC: 142 MMOL/L (ref 136–145)
WBC # BLD AUTO: 10.72 K/UL (ref 3.9–12.7)
WBC # BLD AUTO: 8.86 K/UL (ref 3.9–12.7)

## 2021-12-02 PROCEDURE — 25000003 PHARM REV CODE 250: Performed by: CLINICAL NURSE SPECIALIST

## 2021-12-02 PROCEDURE — 99233 PR SUBSEQUENT HOSPITAL CARE,LEVL III: ICD-10-PCS | Mod: ,,, | Performed by: NURSE PRACTITIONER

## 2021-12-02 PROCEDURE — 99291 CRITICAL CARE FIRST HOUR: CPT | Mod: ,,, | Performed by: CLINICAL NURSE SPECIALIST

## 2021-12-02 PROCEDURE — 85027 COMPLETE CBC AUTOMATED: CPT | Mod: 91 | Performed by: NURSE PRACTITIONER

## 2021-12-02 PROCEDURE — 99233 SBSQ HOSP IP/OBS HIGH 50: CPT | Mod: ,,, | Performed by: NURSE PRACTITIONER

## 2021-12-02 PROCEDURE — 92526 ORAL FUNCTION THERAPY: CPT

## 2021-12-02 PROCEDURE — 85007 BL SMEAR W/DIFF WBC COUNT: CPT | Mod: 91 | Performed by: NURSE PRACTITIONER

## 2021-12-02 PROCEDURE — 94761 N-INVAS EAR/PLS OXIMETRY MLT: CPT

## 2021-12-02 PROCEDURE — 97535 SELF CARE MNGMENT TRAINING: CPT

## 2021-12-02 PROCEDURE — 94640 AIRWAY INHALATION TREATMENT: CPT

## 2021-12-02 PROCEDURE — 83735 ASSAY OF MAGNESIUM: CPT | Performed by: NURSE PRACTITIONER

## 2021-12-02 PROCEDURE — 25000003 PHARM REV CODE 250: Performed by: INTERNAL MEDICINE

## 2021-12-02 PROCEDURE — 25000242 PHARM REV CODE 250 ALT 637 W/ HCPCS: Performed by: STUDENT IN AN ORGANIZED HEALTH CARE EDUCATION/TRAINING PROGRAM

## 2021-12-02 PROCEDURE — 63600175 PHARM REV CODE 636 W HCPCS: Performed by: CLINICAL NURSE SPECIALIST

## 2021-12-02 PROCEDURE — 80053 COMPREHEN METABOLIC PANEL: CPT | Performed by: NURSE PRACTITIONER

## 2021-12-02 PROCEDURE — 63600175 PHARM REV CODE 636 W HCPCS: Performed by: PHYSICIAN ASSISTANT

## 2021-12-02 PROCEDURE — 63600175 PHARM REV CODE 636 W HCPCS: Performed by: INTERNAL MEDICINE

## 2021-12-02 PROCEDURE — 97110 THERAPEUTIC EXERCISES: CPT

## 2021-12-02 PROCEDURE — 20600001 HC STEP DOWN PRIVATE ROOM

## 2021-12-02 PROCEDURE — 84100 ASSAY OF PHOSPHORUS: CPT | Performed by: NURSE PRACTITIONER

## 2021-12-02 PROCEDURE — 25000003 PHARM REV CODE 250: Performed by: STUDENT IN AN ORGANIZED HEALTH CARE EDUCATION/TRAINING PROGRAM

## 2021-12-02 PROCEDURE — 97530 THERAPEUTIC ACTIVITIES: CPT

## 2021-12-02 PROCEDURE — 99291 PR CRITICAL CARE, E/M 30-74 MINUTES: ICD-10-PCS | Mod: ,,, | Performed by: CLINICAL NURSE SPECIALIST

## 2021-12-02 RX ORDER — SODIUM,POTASSIUM PHOSPHATES 280-250MG
1 POWDER IN PACKET (EA) ORAL ONCE
Status: COMPLETED | OUTPATIENT
Start: 2021-12-02 | End: 2021-12-02

## 2021-12-02 RX ORDER — PROPRANOLOL HYDROCHLORIDE 20 MG/1
60 TABLET ORAL 2 TIMES DAILY
Status: DISCONTINUED | OUTPATIENT
Start: 2021-12-02 | End: 2021-12-03

## 2021-12-02 RX ORDER — ONDANSETRON 2 MG/ML
4 INJECTION INTRAMUSCULAR; INTRAVENOUS EVERY 8 HOURS PRN
Status: DISCONTINUED | OUTPATIENT
Start: 2021-12-02 | End: 2021-12-30 | Stop reason: HOSPADM

## 2021-12-02 RX ORDER — PROPRANOLOL HYDROCHLORIDE 20 MG/1
20 TABLET ORAL ONCE
Status: COMPLETED | OUTPATIENT
Start: 2021-12-02 | End: 2021-12-02

## 2021-12-02 RX ADMIN — HYDRALAZINE HYDROCHLORIDE 10 MG: 20 INJECTION, SOLUTION INTRAMUSCULAR; INTRAVENOUS at 07:12

## 2021-12-02 RX ADMIN — POTASSIUM & SODIUM PHOSPHATES POWDER PACK 280-160-250 MG 1 PACKET: 280-160-250 PACK at 11:12

## 2021-12-02 RX ADMIN — INSULIN ASPART 2 UNITS: 100 INJECTION, SOLUTION INTRAVENOUS; SUBCUTANEOUS at 12:12

## 2021-12-02 RX ADMIN — INSULIN ASPART 4 UNITS: 100 INJECTION, SOLUTION INTRAVENOUS; SUBCUTANEOUS at 03:12

## 2021-12-02 RX ADMIN — MUPIROCIN: 20 OINTMENT TOPICAL at 09:12

## 2021-12-02 RX ADMIN — AMLODIPINE BESYLATE 10 MG: 10 TABLET ORAL at 09:12

## 2021-12-02 RX ADMIN — IPRATROPIUM BROMIDE AND ALBUTEROL SULFATE 3 ML: 2.5; .5 SOLUTION RESPIRATORY (INHALATION) at 07:12

## 2021-12-02 RX ADMIN — INSULIN ASPART 4 UNITS: 100 INJECTION, SOLUTION INTRAVENOUS; SUBCUTANEOUS at 02:12

## 2021-12-02 RX ADMIN — HEPARIN SODIUM 5000 UNITS: 5000 INJECTION INTRAVENOUS; SUBCUTANEOUS at 05:12

## 2021-12-02 RX ADMIN — FUROSEMIDE 160 MG: 10 INJECTION, SOLUTION INTRAMUSCULAR; INTRAVENOUS at 11:12

## 2021-12-02 RX ADMIN — FAMOTIDINE 20 MG: 10 INJECTION, SOLUTION INTRAVENOUS at 11:12

## 2021-12-02 RX ADMIN — INSULIN ASPART 4 UNITS: 100 INJECTION, SOLUTION INTRAVENOUS; SUBCUTANEOUS at 10:12

## 2021-12-02 RX ADMIN — FUROSEMIDE 160 MG: 10 INJECTION, SOLUTION INTRAMUSCULAR; INTRAVENOUS at 02:12

## 2021-12-02 RX ADMIN — PROPRANOLOL HYDROCHLORIDE 20 MG: 20 TABLET ORAL at 11:12

## 2021-12-02 RX ADMIN — IPRATROPIUM BROMIDE AND ALBUTEROL SULFATE 3 ML: 2.5; .5 SOLUTION RESPIRATORY (INHALATION) at 03:12

## 2021-12-02 RX ADMIN — HEPARIN SODIUM 5000 UNITS: 5000 INJECTION INTRAVENOUS; SUBCUTANEOUS at 02:12

## 2021-12-02 RX ADMIN — IPRATROPIUM BROMIDE AND ALBUTEROL SULFATE 3 ML: 2.5; .5 SOLUTION RESPIRATORY (INHALATION) at 12:12

## 2021-12-02 RX ADMIN — CEFEPIME 1 G: 1 INJECTION, POWDER, FOR SOLUTION INTRAMUSCULAR; INTRAVENOUS at 11:12

## 2021-12-02 RX ADMIN — IPRATROPIUM BROMIDE AND ALBUTEROL SULFATE 3 ML: 2.5; .5 SOLUTION RESPIRATORY (INHALATION) at 01:12

## 2021-12-02 RX ADMIN — ATORVASTATIN CALCIUM 80 MG: 20 TABLET, FILM COATED ORAL at 09:12

## 2021-12-02 RX ADMIN — PROPRANOLOL HYDROCHLORIDE 40 MG: 20 TABLET ORAL at 09:12

## 2021-12-02 RX ADMIN — LEVOTHYROXINE SODIUM 50 MCG: 50 TABLET ORAL at 05:12

## 2021-12-02 RX ADMIN — PROPRANOLOL HYDROCHLORIDE 60 MG: 20 TABLET ORAL at 09:12

## 2021-12-02 RX ADMIN — ASPIRIN 81 MG CHEWABLE TABLET 81 MG: 81 TABLET CHEWABLE at 09:12

## 2021-12-02 RX ADMIN — INSULIN ASPART 1 UNITS: 100 INJECTION, SOLUTION INTRAVENOUS; SUBCUTANEOUS at 09:12

## 2021-12-02 RX ADMIN — IPRATROPIUM BROMIDE AND ALBUTEROL SULFATE 3 ML: 2.5; .5 SOLUTION RESPIRATORY (INHALATION) at 08:12

## 2021-12-02 RX ADMIN — HEPARIN SODIUM 5000 UNITS: 5000 INJECTION INTRAVENOUS; SUBCUTANEOUS at 09:12

## 2021-12-03 LAB
ALBUMIN SERPL BCP-MCNC: 2.1 G/DL (ref 3.5–5.2)
ALP SERPL-CCNC: 127 U/L (ref 55–135)
ALT SERPL W/O P-5'-P-CCNC: 19 U/L (ref 10–44)
ANION GAP SERPL CALC-SCNC: 14 MMOL/L (ref 8–16)
ANISOCYTOSIS BLD QL SMEAR: SLIGHT
AST SERPL-CCNC: 14 U/L (ref 10–40)
BACTERIA BLD CULT: NORMAL
BASO STIPL BLD QL SMEAR: ABNORMAL
BASOPHILS # BLD AUTO: ABNORMAL K/UL (ref 0–0.2)
BASOPHILS # BLD AUTO: ABNORMAL K/UL (ref 0–0.2)
BASOPHILS NFR BLD: 0 % (ref 0–1.9)
BASOPHILS NFR BLD: 0 % (ref 0–1.9)
BILIRUB SERPL-MCNC: 0.3 MG/DL (ref 0.1–1)
BUN SERPL-MCNC: 21 MG/DL (ref 8–23)
CALCIUM SERPL-MCNC: 9.3 MG/DL (ref 8.7–10.5)
CHLORIDE SERPL-SCNC: 105 MMOL/L (ref 95–110)
CO2 SERPL-SCNC: 26 MMOL/L (ref 23–29)
CREAT SERPL-MCNC: 2.2 MG/DL (ref 0.5–1.4)
DIFFERENTIAL METHOD: ABNORMAL
DIFFERENTIAL METHOD: ABNORMAL
EOSINOPHIL # BLD AUTO: ABNORMAL K/UL (ref 0–0.5)
EOSINOPHIL # BLD AUTO: ABNORMAL K/UL (ref 0–0.5)
EOSINOPHIL NFR BLD: 0 % (ref 0–8)
EOSINOPHIL NFR BLD: 1 % (ref 0–8)
ERYTHROCYTE [DISTWIDTH] IN BLOOD BY AUTOMATED COUNT: 17.2 % (ref 11.5–14.5)
ERYTHROCYTE [DISTWIDTH] IN BLOOD BY AUTOMATED COUNT: 17.5 % (ref 11.5–14.5)
EST. GFR  (AFRICAN AMERICAN): 25.6 ML/MIN/1.73 M^2
EST. GFR  (NON AFRICAN AMERICAN): 22.2 ML/MIN/1.73 M^2
GLUCOSE SERPL-MCNC: 169 MG/DL (ref 70–110)
HCT VFR BLD AUTO: 24.2 % (ref 37–48.5)
HCT VFR BLD AUTO: 25.6 % (ref 37–48.5)
HGB BLD-MCNC: 7.8 G/DL (ref 12–16)
HGB BLD-MCNC: 8 G/DL (ref 12–16)
HYPOCHROMIA BLD QL SMEAR: ABNORMAL
IMM GRANULOCYTES # BLD AUTO: ABNORMAL K/UL (ref 0–0.04)
IMM GRANULOCYTES # BLD AUTO: ABNORMAL K/UL (ref 0–0.04)
IMM GRANULOCYTES NFR BLD AUTO: ABNORMAL % (ref 0–0.5)
IMM GRANULOCYTES NFR BLD AUTO: ABNORMAL % (ref 0–0.5)
LYMPHOCYTES # BLD AUTO: ABNORMAL K/UL (ref 1–4.8)
LYMPHOCYTES # BLD AUTO: ABNORMAL K/UL (ref 1–4.8)
LYMPHOCYTES NFR BLD: 16 % (ref 18–48)
LYMPHOCYTES NFR BLD: 5 % (ref 18–48)
MAGNESIUM SERPL-MCNC: 2.2 MG/DL (ref 1.6–2.6)
MCH RBC QN AUTO: 28.4 PG (ref 27–31)
MCH RBC QN AUTO: 28.4 PG (ref 27–31)
MCHC RBC AUTO-ENTMCNC: 31.3 G/DL (ref 32–36)
MCHC RBC AUTO-ENTMCNC: 32.2 G/DL (ref 32–36)
MCV RBC AUTO: 88 FL (ref 82–98)
MCV RBC AUTO: 91 FL (ref 82–98)
METAMYELOCYTES NFR BLD MANUAL: 2 %
MONOCYTES # BLD AUTO: ABNORMAL K/UL (ref 0.3–1)
MONOCYTES # BLD AUTO: ABNORMAL K/UL (ref 0.3–1)
MONOCYTES NFR BLD: 9 % (ref 4–15)
MONOCYTES NFR BLD: 9 % (ref 4–15)
MYELOCYTES NFR BLD MANUAL: 1 %
NEUTROPHILS NFR BLD: 73 % (ref 38–73)
NEUTROPHILS NFR BLD: 81 % (ref 38–73)
NEUTS BAND NFR BLD MANUAL: 1 %
NEUTS BAND NFR BLD MANUAL: 2 %
NRBC BLD-RTO: 1 /100 WBC
NRBC BLD-RTO: 1 /100 WBC
OVALOCYTES BLD QL SMEAR: ABNORMAL
PHOSPHATE SERPL-MCNC: 2.7 MG/DL (ref 2.7–4.5)
PLATELET # BLD AUTO: 214 K/UL (ref 150–450)
PLATELET # BLD AUTO: 243 K/UL (ref 150–450)
PLATELET BLD QL SMEAR: ABNORMAL
PLATELET BLD QL SMEAR: ABNORMAL
PMV BLD AUTO: 11 FL (ref 9.2–12.9)
PMV BLD AUTO: 9.8 FL (ref 9.2–12.9)
POCT GLUCOSE: 214 MG/DL (ref 70–110)
POCT GLUCOSE: 221 MG/DL (ref 70–110)
POCT GLUCOSE: 245 MG/DL (ref 70–110)
POCT GLUCOSE: 251 MG/DL (ref 70–110)
POIKILOCYTOSIS BLD QL SMEAR: SLIGHT
POLYCHROMASIA BLD QL SMEAR: ABNORMAL
POTASSIUM SERPL-SCNC: 4 MMOL/L (ref 3.5–5.1)
PROT SERPL-MCNC: 5 G/DL (ref 6–8.4)
RBC # BLD AUTO: 2.75 M/UL (ref 4–5.4)
RBC # BLD AUTO: 2.82 M/UL (ref 4–5.4)
SODIUM SERPL-SCNC: 145 MMOL/L (ref 136–145)
WBC # BLD AUTO: 10.72 K/UL (ref 3.9–12.7)
WBC # BLD AUTO: 12.98 K/UL (ref 3.9–12.7)

## 2021-12-03 PROCEDURE — 63600175 PHARM REV CODE 636 W HCPCS: Performed by: STUDENT IN AN ORGANIZED HEALTH CARE EDUCATION/TRAINING PROGRAM

## 2021-12-03 PROCEDURE — 97535 SELF CARE MNGMENT TRAINING: CPT

## 2021-12-03 PROCEDURE — 25000003 PHARM REV CODE 250: Performed by: STUDENT IN AN ORGANIZED HEALTH CARE EDUCATION/TRAINING PROGRAM

## 2021-12-03 PROCEDURE — 99232 SBSQ HOSP IP/OBS MODERATE 35: CPT | Mod: ,,, | Performed by: NURSE PRACTITIONER

## 2021-12-03 PROCEDURE — 83735 ASSAY OF MAGNESIUM: CPT | Performed by: NURSE PRACTITIONER

## 2021-12-03 PROCEDURE — 25000003 PHARM REV CODE 250: Performed by: INTERNAL MEDICINE

## 2021-12-03 PROCEDURE — 36415 COLL VENOUS BLD VENIPUNCTURE: CPT | Performed by: NURSE PRACTITIONER

## 2021-12-03 PROCEDURE — 92526 ORAL FUNCTION THERAPY: CPT

## 2021-12-03 PROCEDURE — 99233 SBSQ HOSP IP/OBS HIGH 50: CPT | Mod: ,,, | Performed by: STUDENT IN AN ORGANIZED HEALTH CARE EDUCATION/TRAINING PROGRAM

## 2021-12-03 PROCEDURE — 63600175 PHARM REV CODE 636 W HCPCS: Performed by: CLINICAL NURSE SPECIALIST

## 2021-12-03 PROCEDURE — 94640 AIRWAY INHALATION TREATMENT: CPT

## 2021-12-03 PROCEDURE — 25000242 PHARM REV CODE 250 ALT 637 W/ HCPCS: Performed by: STUDENT IN AN ORGANIZED HEALTH CARE EDUCATION/TRAINING PROGRAM

## 2021-12-03 PROCEDURE — 20600001 HC STEP DOWN PRIVATE ROOM

## 2021-12-03 PROCEDURE — 99900035 HC TECH TIME PER 15 MIN (STAT)

## 2021-12-03 PROCEDURE — 94761 N-INVAS EAR/PLS OXIMETRY MLT: CPT

## 2021-12-03 PROCEDURE — 99232 PR SUBSEQUENT HOSPITAL CARE,LEVL II: ICD-10-PCS | Mod: ,,, | Performed by: NURSE PRACTITIONER

## 2021-12-03 PROCEDURE — 25000003 PHARM REV CODE 250: Performed by: CLINICAL NURSE SPECIALIST

## 2021-12-03 PROCEDURE — 80053 COMPREHEN METABOLIC PANEL: CPT | Performed by: NURSE PRACTITIONER

## 2021-12-03 PROCEDURE — 85027 COMPLETE CBC AUTOMATED: CPT | Performed by: NURSE PRACTITIONER

## 2021-12-03 PROCEDURE — 85007 BL SMEAR W/DIFF WBC COUNT: CPT | Performed by: NURSE PRACTITIONER

## 2021-12-03 PROCEDURE — 99233 PR SUBSEQUENT HOSPITAL CARE,LEVL III: ICD-10-PCS | Mod: ,,, | Performed by: STUDENT IN AN ORGANIZED HEALTH CARE EDUCATION/TRAINING PROGRAM

## 2021-12-03 PROCEDURE — 84100 ASSAY OF PHOSPHORUS: CPT | Performed by: NURSE PRACTITIONER

## 2021-12-03 RX ORDER — CARVEDILOL 6.25 MG/1
6.25 TABLET ORAL 2 TIMES DAILY
Status: DISCONTINUED | OUTPATIENT
Start: 2021-12-03 | End: 2021-12-06

## 2021-12-03 RX ORDER — NIFEDIPINE 30 MG/1
60 TABLET, EXTENDED RELEASE ORAL DAILY
Status: DISCONTINUED | OUTPATIENT
Start: 2021-12-04 | End: 2021-12-06

## 2021-12-03 RX ORDER — HYDRALAZINE HYDROCHLORIDE 50 MG/1
50 TABLET, FILM COATED ORAL EVERY 8 HOURS
Status: DISCONTINUED | OUTPATIENT
Start: 2021-12-03 | End: 2021-12-06

## 2021-12-03 RX ADMIN — FUROSEMIDE 160 MG: 10 INJECTION, SOLUTION INTRAMUSCULAR; INTRAVENOUS at 12:12

## 2021-12-03 RX ADMIN — MUPIROCIN: 20 OINTMENT TOPICAL at 08:12

## 2021-12-03 RX ADMIN — INSULIN ASPART 2 UNITS: 100 INJECTION, SOLUTION INTRAVENOUS; SUBCUTANEOUS at 12:12

## 2021-12-03 RX ADMIN — HYDRALAZINE HYDROCHLORIDE 50 MG: 50 TABLET ORAL at 02:12

## 2021-12-03 RX ADMIN — LEVOTHYROXINE SODIUM 50 MCG: 50 TABLET ORAL at 06:12

## 2021-12-03 RX ADMIN — CHLOROTHIAZIDE SODIUM 500 MG: 500 INJECTION, POWDER, LYOPHILIZED, FOR SOLUTION INTRAVENOUS at 12:12

## 2021-12-03 RX ADMIN — HEPARIN SODIUM 5000 UNITS: 5000 INJECTION INTRAVENOUS; SUBCUTANEOUS at 09:12

## 2021-12-03 RX ADMIN — AMLODIPINE BESYLATE 10 MG: 10 TABLET ORAL at 10:12

## 2021-12-03 RX ADMIN — FUROSEMIDE 160 MG: 10 INJECTION, SOLUTION INTRAMUSCULAR; INTRAVENOUS at 04:12

## 2021-12-03 RX ADMIN — INSULIN ASPART 4 UNITS: 100 INJECTION, SOLUTION INTRAVENOUS; SUBCUTANEOUS at 10:12

## 2021-12-03 RX ADMIN — ATORVASTATIN CALCIUM 80 MG: 20 TABLET, FILM COATED ORAL at 10:12

## 2021-12-03 RX ADMIN — IPRATROPIUM BROMIDE AND ALBUTEROL SULFATE 3 ML: 2.5; .5 SOLUTION RESPIRATORY (INHALATION) at 08:12

## 2021-12-03 RX ADMIN — HYDRALAZINE HYDROCHLORIDE 50 MG: 50 TABLET ORAL at 09:12

## 2021-12-03 RX ADMIN — PROPRANOLOL HYDROCHLORIDE 60 MG: 20 TABLET ORAL at 10:12

## 2021-12-03 RX ADMIN — IPRATROPIUM BROMIDE AND ALBUTEROL SULFATE 3 ML: 2.5; .5 SOLUTION RESPIRATORY (INHALATION) at 12:12

## 2021-12-03 RX ADMIN — IPRATROPIUM BROMIDE AND ALBUTEROL SULFATE 3 ML: 2.5; .5 SOLUTION RESPIRATORY (INHALATION) at 03:12

## 2021-12-03 RX ADMIN — MUPIROCIN: 20 OINTMENT TOPICAL at 10:12

## 2021-12-03 RX ADMIN — CARVEDILOL 6.25 MG: 6.25 TABLET, FILM COATED ORAL at 08:12

## 2021-12-03 RX ADMIN — HEPARIN SODIUM 5000 UNITS: 5000 INJECTION INTRAVENOUS; SUBCUTANEOUS at 06:12

## 2021-12-03 RX ADMIN — INSULIN ASPART 2 UNITS: 100 INJECTION, SOLUTION INTRAVENOUS; SUBCUTANEOUS at 09:12

## 2021-12-03 RX ADMIN — IPRATROPIUM BROMIDE AND ALBUTEROL SULFATE 3 ML: 2.5; .5 SOLUTION RESPIRATORY (INHALATION) at 11:12

## 2021-12-03 RX ADMIN — FUROSEMIDE 160 MG: 10 INJECTION, SOLUTION INTRAMUSCULAR; INTRAVENOUS at 08:12

## 2021-12-03 RX ADMIN — CEFEPIME 1 G: 1 INJECTION, POWDER, FOR SOLUTION INTRAMUSCULAR; INTRAVENOUS at 10:12

## 2021-12-03 RX ADMIN — ANASTROZOLE 1 MG: 1 TABLET, COATED ORAL at 10:12

## 2021-12-03 RX ADMIN — HEPARIN SODIUM 5000 UNITS: 5000 INJECTION INTRAVENOUS; SUBCUTANEOUS at 02:12

## 2021-12-03 RX ADMIN — FUROSEMIDE 160 MG: 10 INJECTION, SOLUTION INTRAMUSCULAR; INTRAVENOUS at 11:12

## 2021-12-03 RX ADMIN — IPRATROPIUM BROMIDE AND ALBUTEROL SULFATE 3 ML: 2.5; .5 SOLUTION RESPIRATORY (INHALATION) at 07:12

## 2021-12-03 RX ADMIN — FAMOTIDINE 20 MG: 10 INJECTION, SOLUTION INTRAVENOUS at 10:12

## 2021-12-03 RX ADMIN — INSULIN ASPART 4 UNITS: 100 INJECTION, SOLUTION INTRAVENOUS; SUBCUTANEOUS at 12:12

## 2021-12-03 RX ADMIN — ASPIRIN 81 MG CHEWABLE TABLET 81 MG: 81 TABLET CHEWABLE at 10:12

## 2021-12-04 LAB
ALBUMIN SERPL BCP-MCNC: 1.9 G/DL (ref 3.5–5.2)
ALBUMIN SERPL BCP-MCNC: 1.9 G/DL (ref 3.5–5.2)
ALP SERPL-CCNC: 114 U/L (ref 55–135)
ALT SERPL W/O P-5'-P-CCNC: 17 U/L (ref 10–44)
ANION GAP SERPL CALC-SCNC: 10 MMOL/L (ref 8–16)
ANION GAP SERPL CALC-SCNC: 11 MMOL/L (ref 8–16)
ANISOCYTOSIS BLD QL SMEAR: SLIGHT
AST SERPL-CCNC: 13 U/L (ref 10–40)
BASOPHILS NFR BLD: 0 % (ref 0–1.9)
BILIRUB SERPL-MCNC: 0.3 MG/DL (ref 0.1–1)
BUN SERPL-MCNC: 29 MG/DL (ref 8–23)
BUN SERPL-MCNC: 29 MG/DL (ref 8–23)
CALCIUM SERPL-MCNC: 8.4 MG/DL (ref 8.7–10.5)
CALCIUM SERPL-MCNC: 8.5 MG/DL (ref 8.7–10.5)
CHLORIDE SERPL-SCNC: 101 MMOL/L (ref 95–110)
CHLORIDE SERPL-SCNC: 104 MMOL/L (ref 95–110)
CO2 SERPL-SCNC: 26 MMOL/L (ref 23–29)
CO2 SERPL-SCNC: 27 MMOL/L (ref 23–29)
CREAT SERPL-MCNC: 2.7 MG/DL (ref 0.5–1.4)
CREAT SERPL-MCNC: 2.7 MG/DL (ref 0.5–1.4)
DIFFERENTIAL METHOD: ABNORMAL
EOSINOPHIL NFR BLD: 3 % (ref 0–8)
ERYTHROCYTE [DISTWIDTH] IN BLOOD BY AUTOMATED COUNT: 17.3 % (ref 11.5–14.5)
EST. GFR  (AFRICAN AMERICAN): 20 ML/MIN/1.73 M^2
EST. GFR  (AFRICAN AMERICAN): 20 ML/MIN/1.73 M^2
EST. GFR  (NON AFRICAN AMERICAN): 17.3 ML/MIN/1.73 M^2
EST. GFR  (NON AFRICAN AMERICAN): 17.3 ML/MIN/1.73 M^2
GLUCOSE SERPL-MCNC: 179 MG/DL (ref 70–110)
GLUCOSE SERPL-MCNC: 184 MG/DL (ref 70–110)
HCT VFR BLD AUTO: 26.1 % (ref 37–48.5)
HGB BLD-MCNC: 7.8 G/DL (ref 12–16)
HYPOCHROMIA BLD QL SMEAR: ABNORMAL
IMM GRANULOCYTES # BLD AUTO: ABNORMAL K/UL (ref 0–0.04)
IMM GRANULOCYTES NFR BLD AUTO: ABNORMAL % (ref 0–0.5)
LYMPHOCYTES NFR BLD: 14 % (ref 18–48)
MAGNESIUM SERPL-MCNC: 2.1 MG/DL (ref 1.6–2.6)
MCH RBC QN AUTO: 27.6 PG (ref 27–31)
MCHC RBC AUTO-ENTMCNC: 29.9 G/DL (ref 32–36)
MCV RBC AUTO: 92 FL (ref 82–98)
METAMYELOCYTES NFR BLD MANUAL: 2 %
MONOCYTES NFR BLD: 8 % (ref 4–15)
MYELOCYTES NFR BLD MANUAL: 1 %
NEUTROPHILS NFR BLD: 69 % (ref 38–73)
NEUTS BAND NFR BLD MANUAL: 3 %
NRBC BLD-RTO: 0 /100 WBC
PHOSPHATE SERPL-MCNC: 3 MG/DL (ref 2.7–4.5)
PHOSPHATE SERPL-MCNC: 3.1 MG/DL (ref 2.7–4.5)
PLATELET # BLD AUTO: 212 K/UL (ref 150–450)
PMV BLD AUTO: 10.1 FL (ref 9.2–12.9)
POCT GLUCOSE: 213 MG/DL (ref 70–110)
POCT GLUCOSE: 214 MG/DL (ref 70–110)
POCT GLUCOSE: 235 MG/DL (ref 70–110)
POCT GLUCOSE: 254 MG/DL (ref 70–110)
POCT GLUCOSE: 296 MG/DL (ref 70–110)
POCT GLUCOSE: 298 MG/DL (ref 70–110)
POLYCHROMASIA BLD QL SMEAR: ABNORMAL
POTASSIUM SERPL-SCNC: 3.4 MMOL/L (ref 3.5–5.1)
POTASSIUM SERPL-SCNC: 3.4 MMOL/L (ref 3.5–5.1)
PROT SERPL-MCNC: 4.8 G/DL (ref 6–8.4)
RBC # BLD AUTO: 2.83 M/UL (ref 4–5.4)
SODIUM SERPL-SCNC: 138 MMOL/L (ref 136–145)
SODIUM SERPL-SCNC: 141 MMOL/L (ref 136–145)
WBC # BLD AUTO: 10.56 K/UL (ref 3.9–12.7)

## 2021-12-04 PROCEDURE — 99900035 HC TECH TIME PER 15 MIN (STAT)

## 2021-12-04 PROCEDURE — 99233 PR SUBSEQUENT HOSPITAL CARE,LEVL III: ICD-10-PCS | Mod: ,,, | Performed by: STUDENT IN AN ORGANIZED HEALTH CARE EDUCATION/TRAINING PROGRAM

## 2021-12-04 PROCEDURE — 94640 AIRWAY INHALATION TREATMENT: CPT

## 2021-12-04 PROCEDURE — 85027 COMPLETE CBC AUTOMATED: CPT | Performed by: NURSE PRACTITIONER

## 2021-12-04 PROCEDURE — 94761 N-INVAS EAR/PLS OXIMETRY MLT: CPT

## 2021-12-04 PROCEDURE — 20600001 HC STEP DOWN PRIVATE ROOM

## 2021-12-04 PROCEDURE — 25000003 PHARM REV CODE 250: Performed by: STUDENT IN AN ORGANIZED HEALTH CARE EDUCATION/TRAINING PROGRAM

## 2021-12-04 PROCEDURE — 25000003 PHARM REV CODE 250: Performed by: INTERNAL MEDICINE

## 2021-12-04 PROCEDURE — 84100 ASSAY OF PHOSPHORUS: CPT | Performed by: NURSE PRACTITIONER

## 2021-12-04 PROCEDURE — 80053 COMPREHEN METABOLIC PANEL: CPT | Performed by: NURSE PRACTITIONER

## 2021-12-04 PROCEDURE — 63600175 PHARM REV CODE 636 W HCPCS: Performed by: CLINICAL NURSE SPECIALIST

## 2021-12-04 PROCEDURE — 85007 BL SMEAR W/DIFF WBC COUNT: CPT | Performed by: NURSE PRACTITIONER

## 2021-12-04 PROCEDURE — 25000242 PHARM REV CODE 250 ALT 637 W/ HCPCS: Performed by: STUDENT IN AN ORGANIZED HEALTH CARE EDUCATION/TRAINING PROGRAM

## 2021-12-04 PROCEDURE — 94760 N-INVAS EAR/PLS OXIMETRY 1: CPT

## 2021-12-04 PROCEDURE — 36415 COLL VENOUS BLD VENIPUNCTURE: CPT | Performed by: STUDENT IN AN ORGANIZED HEALTH CARE EDUCATION/TRAINING PROGRAM

## 2021-12-04 PROCEDURE — 80069 RENAL FUNCTION PANEL: CPT | Performed by: STUDENT IN AN ORGANIZED HEALTH CARE EDUCATION/TRAINING PROGRAM

## 2021-12-04 PROCEDURE — 25000003 PHARM REV CODE 250: Performed by: CLINICAL NURSE SPECIALIST

## 2021-12-04 PROCEDURE — 99233 SBSQ HOSP IP/OBS HIGH 50: CPT | Mod: ,,, | Performed by: STUDENT IN AN ORGANIZED HEALTH CARE EDUCATION/TRAINING PROGRAM

## 2021-12-04 PROCEDURE — 83735 ASSAY OF MAGNESIUM: CPT | Performed by: NURSE PRACTITIONER

## 2021-12-04 RX ORDER — FAMOTIDINE 20 MG/1
20 TABLET, FILM COATED ORAL DAILY
Status: DISCONTINUED | OUTPATIENT
Start: 2021-12-05 | End: 2021-12-30 | Stop reason: HOSPADM

## 2021-12-04 RX ADMIN — INSULIN ASPART 6 UNITS: 100 INJECTION, SOLUTION INTRAVENOUS; SUBCUTANEOUS at 05:12

## 2021-12-04 RX ADMIN — INSULIN ASPART 6 UNITS: 100 INJECTION, SOLUTION INTRAVENOUS; SUBCUTANEOUS at 11:12

## 2021-12-04 RX ADMIN — ANASTROZOLE 1 MG: 1 TABLET, COATED ORAL at 08:12

## 2021-12-04 RX ADMIN — IPRATROPIUM BROMIDE AND ALBUTEROL SULFATE 3 ML: 2.5; .5 SOLUTION RESPIRATORY (INHALATION) at 08:12

## 2021-12-04 RX ADMIN — INSULIN ASPART 2 UNITS: 100 INJECTION, SOLUTION INTRAVENOUS; SUBCUTANEOUS at 10:12

## 2021-12-04 RX ADMIN — HYDRALAZINE HYDROCHLORIDE 50 MG: 50 TABLET ORAL at 02:12

## 2021-12-04 RX ADMIN — FAMOTIDINE 20 MG: 10 INJECTION, SOLUTION INTRAVENOUS at 09:12

## 2021-12-04 RX ADMIN — ATORVASTATIN CALCIUM 80 MG: 20 TABLET, FILM COATED ORAL at 08:12

## 2021-12-04 RX ADMIN — HEPARIN SODIUM 5000 UNITS: 5000 INJECTION INTRAVENOUS; SUBCUTANEOUS at 06:12

## 2021-12-04 RX ADMIN — LEVOTHYROXINE SODIUM 50 MCG: 50 TABLET ORAL at 06:12

## 2021-12-04 RX ADMIN — IPRATROPIUM BROMIDE AND ALBUTEROL SULFATE 3 ML: 2.5; .5 SOLUTION RESPIRATORY (INHALATION) at 03:12

## 2021-12-04 RX ADMIN — NIFEDIPINE 60 MG: 30 TABLET, FILM COATED, EXTENDED RELEASE ORAL at 08:12

## 2021-12-04 RX ADMIN — FUROSEMIDE 160 MG: 10 INJECTION, SOLUTION INTRAMUSCULAR; INTRAVENOUS at 09:12

## 2021-12-04 RX ADMIN — IPRATROPIUM BROMIDE AND ALBUTEROL SULFATE 3 ML: 2.5; .5 SOLUTION RESPIRATORY (INHALATION) at 10:12

## 2021-12-04 RX ADMIN — CEFEPIME 1 G: 1 INJECTION, POWDER, FOR SOLUTION INTRAMUSCULAR; INTRAVENOUS at 11:12

## 2021-12-04 RX ADMIN — CARVEDILOL 6.25 MG: 6.25 TABLET, FILM COATED ORAL at 08:12

## 2021-12-04 RX ADMIN — HEPARIN SODIUM 5000 UNITS: 5000 INJECTION INTRAVENOUS; SUBCUTANEOUS at 02:12

## 2021-12-04 RX ADMIN — INSULIN ASPART 3 UNITS: 100 INJECTION, SOLUTION INTRAVENOUS; SUBCUTANEOUS at 12:12

## 2021-12-04 RX ADMIN — IPRATROPIUM BROMIDE AND ALBUTEROL SULFATE 3 ML: 2.5; .5 SOLUTION RESPIRATORY (INHALATION) at 12:12

## 2021-12-04 RX ADMIN — HYDRALAZINE HYDROCHLORIDE 50 MG: 50 TABLET ORAL at 06:12

## 2021-12-04 RX ADMIN — CARVEDILOL 6.25 MG: 6.25 TABLET, FILM COATED ORAL at 10:12

## 2021-12-04 RX ADMIN — HYDRALAZINE HYDROCHLORIDE 50 MG: 50 TABLET ORAL at 10:12

## 2021-12-04 RX ADMIN — ASPIRIN 81 MG CHEWABLE TABLET 81 MG: 81 TABLET CHEWABLE at 08:12

## 2021-12-04 RX ADMIN — HEPARIN SODIUM 5000 UNITS: 5000 INJECTION INTRAVENOUS; SUBCUTANEOUS at 10:12

## 2021-12-05 LAB
ALBUMIN SERPL BCP-MCNC: 1.7 G/DL (ref 3.5–5.2)
ALBUMIN SERPL BCP-MCNC: 1.8 G/DL (ref 3.5–5.2)
ALP SERPL-CCNC: 111 U/L (ref 55–135)
ALP SERPL-CCNC: 112 U/L (ref 55–135)
ALT SERPL W/O P-5'-P-CCNC: 18 U/L (ref 10–44)
ALT SERPL W/O P-5'-P-CCNC: 18 U/L (ref 10–44)
ANION GAP SERPL CALC-SCNC: 14 MMOL/L (ref 8–16)
ANION GAP SERPL CALC-SCNC: 15 MMOL/L (ref 8–16)
AST SERPL-CCNC: 11 U/L (ref 10–40)
AST SERPL-CCNC: 12 U/L (ref 10–40)
BASOPHILS # BLD AUTO: 0.04 K/UL (ref 0–0.2)
BASOPHILS NFR BLD: 0.2 % (ref 0–1.9)
BILIRUB SERPL-MCNC: 0.3 MG/DL (ref 0.1–1)
BILIRUB SERPL-MCNC: 0.3 MG/DL (ref 0.1–1)
BUN SERPL-MCNC: 32 MG/DL (ref 8–23)
BUN SERPL-MCNC: 34 MG/DL (ref 8–23)
CALCIUM SERPL-MCNC: 7.6 MG/DL (ref 8.7–10.5)
CALCIUM SERPL-MCNC: 7.9 MG/DL (ref 8.7–10.5)
CHLORIDE SERPL-SCNC: 95 MMOL/L (ref 95–110)
CHLORIDE SERPL-SCNC: 98 MMOL/L (ref 95–110)
CO2 SERPL-SCNC: 19 MMOL/L (ref 23–29)
CO2 SERPL-SCNC: 21 MMOL/L (ref 23–29)
CREAT SERPL-MCNC: 2.8 MG/DL (ref 0.5–1.4)
CREAT SERPL-MCNC: 3.2 MG/DL (ref 0.5–1.4)
DIFFERENTIAL METHOD: ABNORMAL
EOSINOPHIL # BLD AUTO: 0.2 K/UL (ref 0–0.5)
EOSINOPHIL NFR BLD: 1.1 % (ref 0–8)
ERYTHROCYTE [DISTWIDTH] IN BLOOD BY AUTOMATED COUNT: 17.4 % (ref 11.5–14.5)
EST. GFR  (AFRICAN AMERICAN): 16.3 ML/MIN/1.73 M^2
EST. GFR  (AFRICAN AMERICAN): 19.1 ML/MIN/1.73 M^2
EST. GFR  (NON AFRICAN AMERICAN): 14.1 ML/MIN/1.73 M^2
EST. GFR  (NON AFRICAN AMERICAN): 16.6 ML/MIN/1.73 M^2
GLUCOSE SERPL-MCNC: 160 MG/DL (ref 70–110)
GLUCOSE SERPL-MCNC: 182 MG/DL (ref 70–110)
HCT VFR BLD AUTO: 24.2 % (ref 37–48.5)
HGB BLD-MCNC: 7.4 G/DL (ref 12–16)
IMM GRANULOCYTES # BLD AUTO: 0.52 K/UL (ref 0–0.04)
IMM GRANULOCYTES NFR BLD AUTO: 2.7 % (ref 0–0.5)
LYMPHOCYTES # BLD AUTO: 2.8 K/UL (ref 1–4.8)
LYMPHOCYTES NFR BLD: 14.8 % (ref 18–48)
MAGNESIUM SERPL-MCNC: 1.8 MG/DL (ref 1.6–2.6)
MCH RBC QN AUTO: 27.7 PG (ref 27–31)
MCHC RBC AUTO-ENTMCNC: 30.6 G/DL (ref 32–36)
MCV RBC AUTO: 91 FL (ref 82–98)
MONOCYTES # BLD AUTO: 1.9 K/UL (ref 0.3–1)
MONOCYTES NFR BLD: 9.8 % (ref 4–15)
NEUTROPHILS # BLD AUTO: 13.5 K/UL (ref 1.8–7.7)
NEUTROPHILS NFR BLD: 71.4 % (ref 38–73)
NRBC BLD-RTO: 0 /100 WBC
OSMOLALITY SERPL: 289 MOSM/KG (ref 275–295)
OSMOLALITY UR: 288 MOSM/KG (ref 50–1200)
PHOSPHATE SERPL-MCNC: 3.1 MG/DL (ref 2.7–4.5)
PLATELET # BLD AUTO: 186 K/UL (ref 150–450)
PMV BLD AUTO: 10.4 FL (ref 9.2–12.9)
POCT GLUCOSE: 158 MG/DL (ref 70–110)
POCT GLUCOSE: 159 MG/DL (ref 70–110)
POCT GLUCOSE: 167 MG/DL (ref 70–110)
POCT GLUCOSE: 207 MG/DL (ref 70–110)
POCT GLUCOSE: 217 MG/DL (ref 70–110)
POTASSIUM SERPL-SCNC: 3.2 MMOL/L (ref 3.5–5.1)
POTASSIUM SERPL-SCNC: 3.9 MMOL/L (ref 3.5–5.1)
PROT SERPL-MCNC: 4.6 G/DL (ref 6–8.4)
PROT SERPL-MCNC: 4.7 G/DL (ref 6–8.4)
RBC # BLD AUTO: 2.67 M/UL (ref 4–5.4)
SODIUM SERPL-SCNC: 130 MMOL/L (ref 136–145)
SODIUM SERPL-SCNC: 132 MMOL/L (ref 136–145)
SODIUM UR-SCNC: 64 MMOL/L (ref 20–250)
WBC # BLD AUTO: 18.92 K/UL (ref 3.9–12.7)

## 2021-12-05 PROCEDURE — 85025 COMPLETE CBC W/AUTO DIFF WBC: CPT | Performed by: STUDENT IN AN ORGANIZED HEALTH CARE EDUCATION/TRAINING PROGRAM

## 2021-12-05 PROCEDURE — 99900035 HC TECH TIME PER 15 MIN (STAT)

## 2021-12-05 PROCEDURE — 94761 N-INVAS EAR/PLS OXIMETRY MLT: CPT

## 2021-12-05 PROCEDURE — 63600175 PHARM REV CODE 636 W HCPCS: Performed by: CLINICAL NURSE SPECIALIST

## 2021-12-05 PROCEDURE — 36415 COLL VENOUS BLD VENIPUNCTURE: CPT | Performed by: STUDENT IN AN ORGANIZED HEALTH CARE EDUCATION/TRAINING PROGRAM

## 2021-12-05 PROCEDURE — 20600001 HC STEP DOWN PRIVATE ROOM

## 2021-12-05 PROCEDURE — 80053 COMPREHEN METABOLIC PANEL: CPT | Performed by: NURSE PRACTITIONER

## 2021-12-05 PROCEDURE — 80053 COMPREHEN METABOLIC PANEL: CPT | Mod: 91 | Performed by: STUDENT IN AN ORGANIZED HEALTH CARE EDUCATION/TRAINING PROGRAM

## 2021-12-05 PROCEDURE — 84100 ASSAY OF PHOSPHORUS: CPT | Performed by: NURSE PRACTITIONER

## 2021-12-05 PROCEDURE — 25000003 PHARM REV CODE 250: Performed by: STUDENT IN AN ORGANIZED HEALTH CARE EDUCATION/TRAINING PROGRAM

## 2021-12-05 PROCEDURE — 25000242 PHARM REV CODE 250 ALT 637 W/ HCPCS: Performed by: STUDENT IN AN ORGANIZED HEALTH CARE EDUCATION/TRAINING PROGRAM

## 2021-12-05 PROCEDURE — 27000221 HC OXYGEN, UP TO 24 HOURS

## 2021-12-05 PROCEDURE — 25000003 PHARM REV CODE 250: Performed by: CLINICAL NURSE SPECIALIST

## 2021-12-05 PROCEDURE — 25000003 PHARM REV CODE 250: Performed by: INTERNAL MEDICINE

## 2021-12-05 PROCEDURE — 36415 COLL VENOUS BLD VENIPUNCTURE: CPT | Performed by: NURSE PRACTITIONER

## 2021-12-05 PROCEDURE — 99233 PR SUBSEQUENT HOSPITAL CARE,LEVL III: ICD-10-PCS | Mod: ,,, | Performed by: STUDENT IN AN ORGANIZED HEALTH CARE EDUCATION/TRAINING PROGRAM

## 2021-12-05 PROCEDURE — 94640 AIRWAY INHALATION TREATMENT: CPT

## 2021-12-05 PROCEDURE — 83935 ASSAY OF URINE OSMOLALITY: CPT | Performed by: STUDENT IN AN ORGANIZED HEALTH CARE EDUCATION/TRAINING PROGRAM

## 2021-12-05 PROCEDURE — 84300 ASSAY OF URINE SODIUM: CPT | Performed by: STUDENT IN AN ORGANIZED HEALTH CARE EDUCATION/TRAINING PROGRAM

## 2021-12-05 PROCEDURE — 83930 ASSAY OF BLOOD OSMOLALITY: CPT | Performed by: STUDENT IN AN ORGANIZED HEALTH CARE EDUCATION/TRAINING PROGRAM

## 2021-12-05 PROCEDURE — 99233 SBSQ HOSP IP/OBS HIGH 50: CPT | Mod: ,,, | Performed by: STUDENT IN AN ORGANIZED HEALTH CARE EDUCATION/TRAINING PROGRAM

## 2021-12-05 PROCEDURE — 83735 ASSAY OF MAGNESIUM: CPT | Performed by: NURSE PRACTITIONER

## 2021-12-05 RX ORDER — ACETAMINOPHEN 325 MG/1
650 TABLET ORAL EVERY 6 HOURS PRN
Status: DISCONTINUED | OUTPATIENT
Start: 2021-12-05 | End: 2021-12-08

## 2021-12-05 RX ORDER — LIDOCAINE 50 MG/G
1 PATCH TOPICAL
Status: DISCONTINUED | OUTPATIENT
Start: 2021-12-05 | End: 2021-12-09

## 2021-12-05 RX ORDER — POTASSIUM CHLORIDE 20 MEQ/1
20 TABLET, EXTENDED RELEASE ORAL ONCE
Status: COMPLETED | OUTPATIENT
Start: 2021-12-05 | End: 2021-12-05

## 2021-12-05 RX ADMIN — HYDRALAZINE HYDROCHLORIDE 50 MG: 50 TABLET ORAL at 06:12

## 2021-12-05 RX ADMIN — IPRATROPIUM BROMIDE AND ALBUTEROL SULFATE 3 ML: 2.5; .5 SOLUTION RESPIRATORY (INHALATION) at 08:12

## 2021-12-05 RX ADMIN — HEPARIN SODIUM 5000 UNITS: 5000 INJECTION INTRAVENOUS; SUBCUTANEOUS at 06:12

## 2021-12-05 RX ADMIN — POTASSIUM CHLORIDE 20 MEQ: 1500 TABLET, EXTENDED RELEASE ORAL at 08:12

## 2021-12-05 RX ADMIN — IPRATROPIUM BROMIDE AND ALBUTEROL SULFATE 3 ML: 2.5; .5 SOLUTION RESPIRATORY (INHALATION) at 12:12

## 2021-12-05 RX ADMIN — ACETAMINOPHEN 650 MG: 325 TABLET ORAL at 12:12

## 2021-12-05 RX ADMIN — CARVEDILOL 6.25 MG: 6.25 TABLET, FILM COATED ORAL at 08:12

## 2021-12-05 RX ADMIN — IPRATROPIUM BROMIDE AND ALBUTEROL SULFATE 3 ML: 2.5; .5 SOLUTION RESPIRATORY (INHALATION) at 03:12

## 2021-12-05 RX ADMIN — LEVOTHYROXINE SODIUM 50 MCG: 50 TABLET ORAL at 06:12

## 2021-12-05 RX ADMIN — HYDRALAZINE HYDROCHLORIDE 50 MG: 50 TABLET ORAL at 10:12

## 2021-12-05 RX ADMIN — ANASTROZOLE 1 MG: 1 TABLET, COATED ORAL at 08:12

## 2021-12-05 RX ADMIN — HEPARIN SODIUM 5000 UNITS: 5000 INJECTION INTRAVENOUS; SUBCUTANEOUS at 10:12

## 2021-12-05 RX ADMIN — FAMOTIDINE 20 MG: 20 TABLET ORAL at 08:12

## 2021-12-05 RX ADMIN — HEPARIN SODIUM 5000 UNITS: 5000 INJECTION INTRAVENOUS; SUBCUTANEOUS at 04:12

## 2021-12-05 RX ADMIN — ATORVASTATIN CALCIUM 80 MG: 20 TABLET, FILM COATED ORAL at 08:12

## 2021-12-05 RX ADMIN — INSULIN ASPART 2 UNITS: 100 INJECTION, SOLUTION INTRAVENOUS; SUBCUTANEOUS at 09:12

## 2021-12-05 RX ADMIN — INSULIN ASPART 4 UNITS: 100 INJECTION, SOLUTION INTRAVENOUS; SUBCUTANEOUS at 04:12

## 2021-12-05 RX ADMIN — NIFEDIPINE 60 MG: 30 TABLET, FILM COATED, EXTENDED RELEASE ORAL at 08:12

## 2021-12-05 RX ADMIN — ASPIRIN 81 MG CHEWABLE TABLET 81 MG: 81 TABLET CHEWABLE at 08:12

## 2021-12-05 RX ADMIN — LIDOCAINE 5% 1 PATCH: 700 PATCH TOPICAL at 12:12

## 2021-12-05 RX ADMIN — INSULIN ASPART 4 UNITS: 100 INJECTION, SOLUTION INTRAVENOUS; SUBCUTANEOUS at 01:12

## 2021-12-05 RX ADMIN — CEFEPIME 1 G: 1 INJECTION, POWDER, FOR SOLUTION INTRAMUSCULAR; INTRAVENOUS at 11:12

## 2021-12-05 RX ADMIN — IPRATROPIUM BROMIDE AND ALBUTEROL SULFATE 3 ML: 2.5; .5 SOLUTION RESPIRATORY (INHALATION) at 10:12

## 2021-12-05 RX ADMIN — IPRATROPIUM BROMIDE AND ALBUTEROL SULFATE 3 ML: 2.5; .5 SOLUTION RESPIRATORY (INHALATION) at 04:12

## 2021-12-06 PROBLEM — D72.829 LEUKOCYTOSIS: Status: ACTIVE | Noted: 2021-12-06

## 2021-12-06 LAB
ABO + RH BLD: NORMAL
ALBUMIN SERPL BCP-MCNC: 1.6 G/DL (ref 3.5–5.2)
ALP SERPL-CCNC: 105 U/L (ref 55–135)
ALT SERPL W/O P-5'-P-CCNC: 14 U/L (ref 10–44)
ANION GAP SERPL CALC-SCNC: 14 MMOL/L (ref 8–16)
AST SERPL-CCNC: 11 U/L (ref 10–40)
BASOPHILS # BLD AUTO: 0.02 K/UL (ref 0–0.2)
BASOPHILS NFR BLD: 0.1 % (ref 0–1.9)
BILIRUB SERPL-MCNC: 0.3 MG/DL (ref 0.1–1)
BLD GP AB SCN CELLS X3 SERPL QL: NORMAL
BLD PROD TYP BPU: NORMAL
BLOOD UNIT EXPIRATION DATE: NORMAL
BLOOD UNIT TYPE CODE: 6200
BLOOD UNIT TYPE: NORMAL
BUN SERPL-MCNC: 41 MG/DL (ref 8–23)
CALCIUM SERPL-MCNC: 7.1 MG/DL (ref 8.7–10.5)
CHLORIDE SERPL-SCNC: 98 MMOL/L (ref 95–110)
CO2 SERPL-SCNC: 20 MMOL/L (ref 23–29)
CODING SYSTEM: NORMAL
CREAT SERPL-MCNC: 3.6 MG/DL (ref 0.5–1.4)
DIFFERENTIAL METHOD: ABNORMAL
DISPENSE STATUS: NORMAL
EOSINOPHIL # BLD AUTO: 0.2 K/UL (ref 0–0.5)
EOSINOPHIL NFR BLD: 0.7 % (ref 0–8)
ERYTHROCYTE [DISTWIDTH] IN BLOOD BY AUTOMATED COUNT: 17.5 % (ref 11.5–14.5)
EST. GFR  (AFRICAN AMERICAN): 14.1 ML/MIN/1.73 M^2
EST. GFR  (NON AFRICAN AMERICAN): 12.2 ML/MIN/1.73 M^2
GLUCOSE SERPL-MCNC: 128 MG/DL (ref 70–110)
HCT VFR BLD AUTO: 22.3 % (ref 37–48.5)
HGB BLD-MCNC: 6.8 G/DL (ref 12–16)
IMM GRANULOCYTES # BLD AUTO: 0.77 K/UL (ref 0–0.04)
IMM GRANULOCYTES NFR BLD AUTO: 3.2 % (ref 0–0.5)
LYMPHOCYTES # BLD AUTO: 2.4 K/UL (ref 1–4.8)
LYMPHOCYTES NFR BLD: 9.8 % (ref 18–48)
MAGNESIUM SERPL-MCNC: 1.7 MG/DL (ref 1.6–2.6)
MCH RBC QN AUTO: 28.3 PG (ref 27–31)
MCHC RBC AUTO-ENTMCNC: 30.5 G/DL (ref 32–36)
MCV RBC AUTO: 93 FL (ref 82–98)
MONOCYTES # BLD AUTO: 2.1 K/UL (ref 0.3–1)
MONOCYTES NFR BLD: 8.6 % (ref 4–15)
NEUTROPHILS # BLD AUTO: 18.7 K/UL (ref 1.8–7.7)
NEUTROPHILS NFR BLD: 77.6 % (ref 38–73)
NRBC BLD-RTO: 0 /100 WBC
NUM UNITS TRANS PACKED RBC: NORMAL
PHOSPHATE SERPL-MCNC: 3.6 MG/DL (ref 2.7–4.5)
PLATELET # BLD AUTO: 167 K/UL (ref 150–450)
PMV BLD AUTO: 10.8 FL (ref 9.2–12.9)
POCT GLUCOSE: 144 MG/DL (ref 70–110)
POCT GLUCOSE: 232 MG/DL (ref 70–110)
POCT GLUCOSE: 266 MG/DL (ref 70–110)
POTASSIUM SERPL-SCNC: 3.5 MMOL/L (ref 3.5–5.1)
PROCALCITONIN SERPL IA-MCNC: 0.69 NG/ML
PROT SERPL-MCNC: 3.9 G/DL (ref 6–8.4)
RBC # BLD AUTO: 2.4 M/UL (ref 4–5.4)
SODIUM SERPL-SCNC: 132 MMOL/L (ref 136–145)
WBC # BLD AUTO: 24.07 K/UL (ref 3.9–12.7)

## 2021-12-06 PROCEDURE — 99233 PR SUBSEQUENT HOSPITAL CARE,LEVL III: ICD-10-PCS | Mod: ,,, | Performed by: NURSE PRACTITIONER

## 2021-12-06 PROCEDURE — 99900035 HC TECH TIME PER 15 MIN (STAT)

## 2021-12-06 PROCEDURE — 27000221 HC OXYGEN, UP TO 24 HOURS

## 2021-12-06 PROCEDURE — 36415 COLL VENOUS BLD VENIPUNCTURE: CPT | Performed by: NURSE PRACTITIONER

## 2021-12-06 PROCEDURE — 99233 SBSQ HOSP IP/OBS HIGH 50: CPT | Mod: ,,, | Performed by: NURSE PRACTITIONER

## 2021-12-06 PROCEDURE — 25000003 PHARM REV CODE 250: Performed by: CLINICAL NURSE SPECIALIST

## 2021-12-06 PROCEDURE — 83735 ASSAY OF MAGNESIUM: CPT | Performed by: NURSE PRACTITIONER

## 2021-12-06 PROCEDURE — 86920 COMPATIBILITY TEST SPIN: CPT | Performed by: STUDENT IN AN ORGANIZED HEALTH CARE EDUCATION/TRAINING PROGRAM

## 2021-12-06 PROCEDURE — P9016 RBC LEUKOCYTES REDUCED: HCPCS | Performed by: STUDENT IN AN ORGANIZED HEALTH CARE EDUCATION/TRAINING PROGRAM

## 2021-12-06 PROCEDURE — 94761 N-INVAS EAR/PLS OXIMETRY MLT: CPT

## 2021-12-06 PROCEDURE — 25000003 PHARM REV CODE 250: Performed by: INTERNAL MEDICINE

## 2021-12-06 PROCEDURE — 84100 ASSAY OF PHOSPHORUS: CPT | Performed by: NURSE PRACTITIONER

## 2021-12-06 PROCEDURE — 92526 ORAL FUNCTION THERAPY: CPT

## 2021-12-06 PROCEDURE — 97535 SELF CARE MNGMENT TRAINING: CPT

## 2021-12-06 PROCEDURE — 25000003 PHARM REV CODE 250: Performed by: STUDENT IN AN ORGANIZED HEALTH CARE EDUCATION/TRAINING PROGRAM

## 2021-12-06 PROCEDURE — 36415 COLL VENOUS BLD VENIPUNCTURE: CPT | Performed by: STUDENT IN AN ORGANIZED HEALTH CARE EDUCATION/TRAINING PROGRAM

## 2021-12-06 PROCEDURE — 20600001 HC STEP DOWN PRIVATE ROOM

## 2021-12-06 PROCEDURE — 63600175 PHARM REV CODE 636 W HCPCS: Performed by: CLINICAL NURSE SPECIALIST

## 2021-12-06 PROCEDURE — 63600175 PHARM REV CODE 636 W HCPCS: Performed by: NURSE PRACTITIONER

## 2021-12-06 PROCEDURE — 85025 COMPLETE CBC W/AUTO DIFF WBC: CPT | Performed by: STUDENT IN AN ORGANIZED HEALTH CARE EDUCATION/TRAINING PROGRAM

## 2021-12-06 PROCEDURE — 80053 COMPREHEN METABOLIC PANEL: CPT | Performed by: NURSE PRACTITIONER

## 2021-12-06 PROCEDURE — 84145 PROCALCITONIN (PCT): CPT | Performed by: STUDENT IN AN ORGANIZED HEALTH CARE EDUCATION/TRAINING PROGRAM

## 2021-12-06 PROCEDURE — 86900 BLOOD TYPING SEROLOGIC ABO: CPT | Performed by: STUDENT IN AN ORGANIZED HEALTH CARE EDUCATION/TRAINING PROGRAM

## 2021-12-06 PROCEDURE — 94640 AIRWAY INHALATION TREATMENT: CPT

## 2021-12-06 PROCEDURE — 87040 BLOOD CULTURE FOR BACTERIA: CPT | Mod: 59 | Performed by: STUDENT IN AN ORGANIZED HEALTH CARE EDUCATION/TRAINING PROGRAM

## 2021-12-06 PROCEDURE — 25000242 PHARM REV CODE 250 ALT 637 W/ HCPCS: Performed by: STUDENT IN AN ORGANIZED HEALTH CARE EDUCATION/TRAINING PROGRAM

## 2021-12-06 PROCEDURE — 99233 PR SUBSEQUENT HOSPITAL CARE,LEVL III: ICD-10-PCS | Mod: ,,, | Performed by: STUDENT IN AN ORGANIZED HEALTH CARE EDUCATION/TRAINING PROGRAM

## 2021-12-06 PROCEDURE — 99233 SBSQ HOSP IP/OBS HIGH 50: CPT | Mod: ,,, | Performed by: STUDENT IN AN ORGANIZED HEALTH CARE EDUCATION/TRAINING PROGRAM

## 2021-12-06 RX ORDER — FUROSEMIDE 10 MG/ML
120 INJECTION INTRAMUSCULAR; INTRAVENOUS ONCE
Status: COMPLETED | OUTPATIENT
Start: 2021-12-06 | End: 2021-12-06

## 2021-12-06 RX ORDER — HYDROCODONE BITARTRATE AND ACETAMINOPHEN 500; 5 MG/1; MG/1
TABLET ORAL
Status: DISCONTINUED | OUTPATIENT
Start: 2021-12-06 | End: 2021-12-16

## 2021-12-06 RX ADMIN — HEPARIN SODIUM 5000 UNITS: 5000 INJECTION INTRAVENOUS; SUBCUTANEOUS at 05:12

## 2021-12-06 RX ADMIN — IPRATROPIUM BROMIDE AND ALBUTEROL SULFATE 3 ML: 2.5; .5 SOLUTION RESPIRATORY (INHALATION) at 02:12

## 2021-12-06 RX ADMIN — HEPARIN SODIUM 5000 UNITS: 5000 INJECTION INTRAVENOUS; SUBCUTANEOUS at 01:12

## 2021-12-06 RX ADMIN — ANASTROZOLE 1 MG: 1 TABLET, COATED ORAL at 08:12

## 2021-12-06 RX ADMIN — IPRATROPIUM BROMIDE AND ALBUTEROL SULFATE 3 ML: 2.5; .5 SOLUTION RESPIRATORY (INHALATION) at 11:12

## 2021-12-06 RX ADMIN — INSULIN ASPART 3 UNITS: 100 INJECTION, SOLUTION INTRAVENOUS; SUBCUTANEOUS at 08:12

## 2021-12-06 RX ADMIN — FUROSEMIDE 120 MG: 40 INJECTION, SOLUTION INTRAMUSCULAR; INTRAVENOUS at 11:12

## 2021-12-06 RX ADMIN — LIDOCAINE 5% 1 PATCH: 700 PATCH TOPICAL at 01:12

## 2021-12-06 RX ADMIN — ASPIRIN 81 MG CHEWABLE TABLET 81 MG: 81 TABLET CHEWABLE at 08:12

## 2021-12-06 RX ADMIN — FAMOTIDINE 20 MG: 20 TABLET ORAL at 08:12

## 2021-12-06 RX ADMIN — ATORVASTATIN CALCIUM 80 MG: 20 TABLET, FILM COATED ORAL at 08:12

## 2021-12-06 RX ADMIN — LEVOTHYROXINE SODIUM 50 MCG: 50 TABLET ORAL at 05:12

## 2021-12-06 RX ADMIN — IPRATROPIUM BROMIDE AND ALBUTEROL SULFATE 3 ML: 2.5; .5 SOLUTION RESPIRATORY (INHALATION) at 05:12

## 2021-12-06 RX ADMIN — IPRATROPIUM BROMIDE AND ALBUTEROL SULFATE 3 ML: 2.5; .5 SOLUTION RESPIRATORY (INHALATION) at 08:12

## 2021-12-06 RX ADMIN — INSULIN ASPART 4 UNITS: 100 INJECTION, SOLUTION INTRAVENOUS; SUBCUTANEOUS at 05:12

## 2021-12-06 RX ADMIN — HEPARIN SODIUM 5000 UNITS: 5000 INJECTION INTRAVENOUS; SUBCUTANEOUS at 10:12

## 2021-12-06 RX ADMIN — IPRATROPIUM BROMIDE AND ALBUTEROL SULFATE 3 ML: 2.5; .5 SOLUTION RESPIRATORY (INHALATION) at 03:12

## 2021-12-06 RX ADMIN — IPRATROPIUM BROMIDE AND ALBUTEROL SULFATE 3 ML: 2.5; .5 SOLUTION RESPIRATORY (INHALATION) at 07:12

## 2021-12-07 LAB
ALBUMIN SERPL BCP-MCNC: 1.5 G/DL (ref 3.5–5.2)
ALP SERPL-CCNC: 125 U/L (ref 55–135)
ALT SERPL W/O P-5'-P-CCNC: 15 U/L (ref 10–44)
ANION GAP SERPL CALC-SCNC: 12 MMOL/L (ref 8–16)
ANISOCYTOSIS BLD QL SMEAR: SLIGHT
AST SERPL-CCNC: 10 U/L (ref 10–40)
BASOPHILS # BLD AUTO: 0.05 K/UL (ref 0–0.2)
BASOPHILS NFR BLD: 0.2 % (ref 0–1.9)
BILIRUB SERPL-MCNC: 0.3 MG/DL (ref 0.1–1)
BUN SERPL-MCNC: 48 MG/DL (ref 8–23)
CALCIUM SERPL-MCNC: 7.3 MG/DL (ref 8.7–10.5)
CHLORIDE SERPL-SCNC: 95 MMOL/L (ref 95–110)
CO2 SERPL-SCNC: 21 MMOL/L (ref 23–29)
CREAT SERPL-MCNC: 4 MG/DL (ref 0.5–1.4)
DIFFERENTIAL METHOD: ABNORMAL
EOSINOPHIL # BLD AUTO: 0.1 K/UL (ref 0–0.5)
EOSINOPHIL NFR BLD: 0.3 % (ref 0–8)
ERYTHROCYTE [DISTWIDTH] IN BLOOD BY AUTOMATED COUNT: 16.7 % (ref 11.5–14.5)
EST. GFR  (AFRICAN AMERICAN): 12.4 ML/MIN/1.73 M^2
EST. GFR  (NON AFRICAN AMERICAN): 10.8 ML/MIN/1.73 M^2
GLUCOSE SERPL-MCNC: 210 MG/DL (ref 70–110)
HCT VFR BLD AUTO: 24.9 % (ref 37–48.5)
HGB BLD-MCNC: 8.2 G/DL (ref 12–16)
IMM GRANULOCYTES # BLD AUTO: 0.73 K/UL (ref 0–0.04)
IMM GRANULOCYTES NFR BLD AUTO: 3.1 % (ref 0–0.5)
LYMPHOCYTES # BLD AUTO: 2.2 K/UL (ref 1–4.8)
LYMPHOCYTES NFR BLD: 9.4 % (ref 18–48)
MAGNESIUM SERPL-MCNC: 1.6 MG/DL (ref 1.6–2.6)
MCH RBC QN AUTO: 28.8 PG (ref 27–31)
MCHC RBC AUTO-ENTMCNC: 32.9 G/DL (ref 32–36)
MCV RBC AUTO: 87 FL (ref 82–98)
MONOCYTES # BLD AUTO: 2.7 K/UL (ref 0.3–1)
MONOCYTES NFR BLD: 11.4 % (ref 4–15)
NEUTROPHILS # BLD AUTO: 17.8 K/UL (ref 1.8–7.7)
NEUTROPHILS NFR BLD: 75.6 % (ref 38–73)
NRBC BLD-RTO: 0 /100 WBC
PHOSPHATE SERPL-MCNC: 3.1 MG/DL (ref 2.7–4.5)
PLATELET # BLD AUTO: 171 K/UL (ref 150–450)
PLATELET BLD QL SMEAR: ABNORMAL
PMV BLD AUTO: 10.5 FL (ref 9.2–12.9)
POCT GLUCOSE: 188 MG/DL (ref 70–110)
POCT GLUCOSE: 202 MG/DL (ref 70–110)
POCT GLUCOSE: 257 MG/DL (ref 70–110)
POTASSIUM SERPL-SCNC: 3.3 MMOL/L (ref 3.5–5.1)
PROT SERPL-MCNC: 4.4 G/DL (ref 6–8.4)
RBC # BLD AUTO: 2.85 M/UL (ref 4–5.4)
SODIUM SERPL-SCNC: 128 MMOL/L (ref 136–145)
WBC # BLD AUTO: 23.5 K/UL (ref 3.9–12.7)

## 2021-12-07 PROCEDURE — 94761 N-INVAS EAR/PLS OXIMETRY MLT: CPT

## 2021-12-07 PROCEDURE — 85025 COMPLETE CBC W/AUTO DIFF WBC: CPT | Performed by: STUDENT IN AN ORGANIZED HEALTH CARE EDUCATION/TRAINING PROGRAM

## 2021-12-07 PROCEDURE — 27000221 HC OXYGEN, UP TO 24 HOURS

## 2021-12-07 PROCEDURE — 94640 AIRWAY INHALATION TREATMENT: CPT

## 2021-12-07 PROCEDURE — 99900035 HC TECH TIME PER 15 MIN (STAT)

## 2021-12-07 PROCEDURE — 25000003 PHARM REV CODE 250: Performed by: INTERNAL MEDICINE

## 2021-12-07 PROCEDURE — 20600001 HC STEP DOWN PRIVATE ROOM

## 2021-12-07 PROCEDURE — 63600175 PHARM REV CODE 636 W HCPCS: Performed by: NURSE PRACTITIONER

## 2021-12-07 PROCEDURE — 99233 SBSQ HOSP IP/OBS HIGH 50: CPT | Mod: ,,, | Performed by: NURSE PRACTITIONER

## 2021-12-07 PROCEDURE — 92526 ORAL FUNCTION THERAPY: CPT

## 2021-12-07 PROCEDURE — 80053 COMPREHEN METABOLIC PANEL: CPT | Performed by: NURSE PRACTITIONER

## 2021-12-07 PROCEDURE — 25000003 PHARM REV CODE 250: Performed by: CLINICAL NURSE SPECIALIST

## 2021-12-07 PROCEDURE — 25000242 PHARM REV CODE 250 ALT 637 W/ HCPCS: Performed by: STUDENT IN AN ORGANIZED HEALTH CARE EDUCATION/TRAINING PROGRAM

## 2021-12-07 PROCEDURE — 63600175 PHARM REV CODE 636 W HCPCS: Performed by: CLINICAL NURSE SPECIALIST

## 2021-12-07 PROCEDURE — 36415 COLL VENOUS BLD VENIPUNCTURE: CPT | Performed by: NURSE PRACTITIONER

## 2021-12-07 PROCEDURE — 84100 ASSAY OF PHOSPHORUS: CPT | Performed by: NURSE PRACTITIONER

## 2021-12-07 PROCEDURE — 83735 ASSAY OF MAGNESIUM: CPT | Performed by: NURSE PRACTITIONER

## 2021-12-07 PROCEDURE — 99233 SBSQ HOSP IP/OBS HIGH 50: CPT | Mod: ,,, | Performed by: STUDENT IN AN ORGANIZED HEALTH CARE EDUCATION/TRAINING PROGRAM

## 2021-12-07 PROCEDURE — 25000003 PHARM REV CODE 250: Performed by: STUDENT IN AN ORGANIZED HEALTH CARE EDUCATION/TRAINING PROGRAM

## 2021-12-07 PROCEDURE — 97535 SELF CARE MNGMENT TRAINING: CPT

## 2021-12-07 PROCEDURE — 99233 PR SUBSEQUENT HOSPITAL CARE,LEVL III: ICD-10-PCS | Mod: ,,, | Performed by: STUDENT IN AN ORGANIZED HEALTH CARE EDUCATION/TRAINING PROGRAM

## 2021-12-07 PROCEDURE — 99233 PR SUBSEQUENT HOSPITAL CARE,LEVL III: ICD-10-PCS | Mod: ,,, | Performed by: NURSE PRACTITIONER

## 2021-12-07 RX ORDER — FUROSEMIDE 10 MG/ML
80 INJECTION INTRAMUSCULAR; INTRAVENOUS 2 TIMES DAILY
Status: DISCONTINUED | OUTPATIENT
Start: 2021-12-07 | End: 2021-12-08

## 2021-12-07 RX ORDER — TALC
6 POWDER (GRAM) TOPICAL NIGHTLY PRN
Status: DISCONTINUED | OUTPATIENT
Start: 2021-12-07 | End: 2021-12-30 | Stop reason: HOSPADM

## 2021-12-07 RX ADMIN — POTASSIUM BICARBONATE 25 MEQ: 978 TABLET, EFFERVESCENT ORAL at 08:12

## 2021-12-07 RX ADMIN — IPRATROPIUM BROMIDE AND ALBUTEROL SULFATE 3 ML: 2.5; .5 SOLUTION RESPIRATORY (INHALATION) at 04:12

## 2021-12-07 RX ADMIN — IPRATROPIUM BROMIDE AND ALBUTEROL SULFATE 3 ML: 2.5; .5 SOLUTION RESPIRATORY (INHALATION) at 12:12

## 2021-12-07 RX ADMIN — LIDOCAINE 5% 1 PATCH: 700 PATCH TOPICAL at 12:12

## 2021-12-07 RX ADMIN — HEPARIN SODIUM 5000 UNITS: 5000 INJECTION INTRAVENOUS; SUBCUTANEOUS at 02:12

## 2021-12-07 RX ADMIN — INSULIN ASPART 6 UNITS: 100 INJECTION, SOLUTION INTRAVENOUS; SUBCUTANEOUS at 04:12

## 2021-12-07 RX ADMIN — HEPARIN SODIUM 5000 UNITS: 5000 INJECTION INTRAVENOUS; SUBCUTANEOUS at 06:12

## 2021-12-07 RX ADMIN — ASPIRIN 81 MG CHEWABLE TABLET 81 MG: 81 TABLET CHEWABLE at 08:12

## 2021-12-07 RX ADMIN — HEPARIN SODIUM 5000 UNITS: 5000 INJECTION INTRAVENOUS; SUBCUTANEOUS at 09:12

## 2021-12-07 RX ADMIN — LEVOTHYROXINE SODIUM 50 MCG: 50 TABLET ORAL at 06:12

## 2021-12-07 RX ADMIN — INSULIN ASPART 2 UNITS: 100 INJECTION, SOLUTION INTRAVENOUS; SUBCUTANEOUS at 11:12

## 2021-12-07 RX ADMIN — IPRATROPIUM BROMIDE AND ALBUTEROL SULFATE 3 ML: 2.5; .5 SOLUTION RESPIRATORY (INHALATION) at 03:12

## 2021-12-07 RX ADMIN — ATORVASTATIN CALCIUM 80 MG: 20 TABLET, FILM COATED ORAL at 08:12

## 2021-12-07 RX ADMIN — INSULIN ASPART 2 UNITS: 100 INJECTION, SOLUTION INTRAVENOUS; SUBCUTANEOUS at 09:12

## 2021-12-07 RX ADMIN — FUROSEMIDE 80 MG: 40 INJECTION, SOLUTION INTRAMUSCULAR; INTRAVENOUS at 11:12

## 2021-12-07 RX ADMIN — ANASTROZOLE 1 MG: 1 TABLET, COATED ORAL at 08:12

## 2021-12-07 RX ADMIN — FAMOTIDINE 20 MG: 20 TABLET ORAL at 08:12

## 2021-12-07 RX ADMIN — IPRATROPIUM BROMIDE AND ALBUTEROL SULFATE 3 ML: 2.5; .5 SOLUTION RESPIRATORY (INHALATION) at 08:12

## 2021-12-07 RX ADMIN — FUROSEMIDE 80 MG: 40 INJECTION, SOLUTION INTRAMUSCULAR; INTRAVENOUS at 09:12

## 2021-12-08 LAB
ALBUMIN SERPL BCP-MCNC: 1.4 G/DL (ref 3.5–5.2)
ALP SERPL-CCNC: 114 U/L (ref 55–135)
ALT SERPL W/O P-5'-P-CCNC: 13 U/L (ref 10–44)
ANION GAP SERPL CALC-SCNC: 14 MMOL/L (ref 8–16)
ANISOCYTOSIS BLD QL SMEAR: SLIGHT
AST SERPL-CCNC: 13 U/L (ref 10–40)
BASOPHILS # BLD AUTO: 0.02 K/UL (ref 0–0.2)
BASOPHILS NFR BLD: 0.1 % (ref 0–1.9)
BILIRUB SERPL-MCNC: 0.3 MG/DL (ref 0.1–1)
BUN SERPL-MCNC: 49 MG/DL (ref 8–23)
CALCIUM SERPL-MCNC: 6.9 MG/DL (ref 8.7–10.5)
CHLORIDE SERPL-SCNC: 90 MMOL/L (ref 95–110)
CHLORIDE UR-SCNC: 81 MMOL/L (ref 25–200)
CO2 SERPL-SCNC: 21 MMOL/L (ref 23–29)
CREAT SERPL-MCNC: 3.9 MG/DL (ref 0.5–1.4)
DIFFERENTIAL METHOD: ABNORMAL
EOSINOPHIL # BLD AUTO: 0.1 K/UL (ref 0–0.5)
EOSINOPHIL NFR BLD: 0.8 % (ref 0–8)
ERYTHROCYTE [DISTWIDTH] IN BLOOD BY AUTOMATED COUNT: 16.6 % (ref 11.5–14.5)
EST. GFR  (AFRICAN AMERICAN): 12.8 ML/MIN/1.73 M^2
EST. GFR  (NON AFRICAN AMERICAN): 11.1 ML/MIN/1.73 M^2
GLUCOSE SERPL-MCNC: 125 MG/DL (ref 70–110)
HCT VFR BLD AUTO: 23.5 % (ref 37–48.5)
HGB BLD-MCNC: 7.7 G/DL (ref 12–16)
HYPOCHROMIA BLD QL SMEAR: ABNORMAL
IMM GRANULOCYTES # BLD AUTO: 0.21 K/UL (ref 0–0.04)
IMM GRANULOCYTES NFR BLD AUTO: 1.5 % (ref 0–0.5)
LYMPHOCYTES # BLD AUTO: 2 K/UL (ref 1–4.8)
LYMPHOCYTES NFR BLD: 13.8 % (ref 18–48)
MAGNESIUM SERPL-MCNC: 1.4 MG/DL (ref 1.6–2.6)
MCH RBC QN AUTO: 28.2 PG (ref 27–31)
MCHC RBC AUTO-ENTMCNC: 32.8 G/DL (ref 32–36)
MCV RBC AUTO: 86 FL (ref 82–98)
MONOCYTES # BLD AUTO: 2.4 K/UL (ref 0.3–1)
MONOCYTES NFR BLD: 16.7 % (ref 4–15)
NEUTROPHILS # BLD AUTO: 9.5 K/UL (ref 1.8–7.7)
NEUTROPHILS NFR BLD: 67.1 % (ref 38–73)
NRBC BLD-RTO: 0 /100 WBC
OSMOLALITY SERPL: 290 MOSM/KG (ref 275–295)
OSMOLALITY UR: 216 MOSM/KG (ref 50–1200)
OVALOCYTES BLD QL SMEAR: ABNORMAL
PHOSPHATE SERPL-MCNC: 3.4 MG/DL (ref 2.7–4.5)
PLATELET # BLD AUTO: 168 K/UL (ref 150–450)
PMV BLD AUTO: 10.4 FL (ref 9.2–12.9)
POCT GLUCOSE: 152 MG/DL (ref 70–110)
POCT GLUCOSE: 160 MG/DL (ref 70–110)
POCT GLUCOSE: 201 MG/DL (ref 70–110)
POCT GLUCOSE: 246 MG/DL (ref 70–110)
POCT GLUCOSE: 274 MG/DL (ref 70–110)
POIKILOCYTOSIS BLD QL SMEAR: SLIGHT
POLYCHROMASIA BLD QL SMEAR: ABNORMAL
POTASSIUM SERPL-SCNC: 3.1 MMOL/L (ref 3.5–5.1)
PROT SERPL-MCNC: 3.8 G/DL (ref 6–8.4)
RBC # BLD AUTO: 2.73 M/UL (ref 4–5.4)
SODIUM SERPL-SCNC: 125 MMOL/L (ref 136–145)
SODIUM UR-SCNC: 72 MMOL/L (ref 20–250)
SPHEROCYTES BLD QL SMEAR: ABNORMAL
WBC # BLD AUTO: 14.16 K/UL (ref 3.9–12.7)

## 2021-12-08 PROCEDURE — 99233 SBSQ HOSP IP/OBS HIGH 50: CPT | Mod: ,,, | Performed by: STUDENT IN AN ORGANIZED HEALTH CARE EDUCATION/TRAINING PROGRAM

## 2021-12-08 PROCEDURE — 97530 THERAPEUTIC ACTIVITIES: CPT

## 2021-12-08 PROCEDURE — 36415 COLL VENOUS BLD VENIPUNCTURE: CPT | Performed by: NURSE PRACTITIONER

## 2021-12-08 PROCEDURE — 25000003 PHARM REV CODE 250: Performed by: PHYSICIAN ASSISTANT

## 2021-12-08 PROCEDURE — 83935 ASSAY OF URINE OSMOLALITY: CPT | Performed by: NURSE PRACTITIONER

## 2021-12-08 PROCEDURE — 85025 COMPLETE CBC W/AUTO DIFF WBC: CPT | Performed by: STUDENT IN AN ORGANIZED HEALTH CARE EDUCATION/TRAINING PROGRAM

## 2021-12-08 PROCEDURE — 94799 UNLISTED PULMONARY SVC/PX: CPT

## 2021-12-08 PROCEDURE — 63600175 PHARM REV CODE 636 W HCPCS: Performed by: CLINICAL NURSE SPECIALIST

## 2021-12-08 PROCEDURE — 94640 AIRWAY INHALATION TREATMENT: CPT

## 2021-12-08 PROCEDURE — 83735 ASSAY OF MAGNESIUM: CPT | Performed by: NURSE PRACTITIONER

## 2021-12-08 PROCEDURE — 84100 ASSAY OF PHOSPHORUS: CPT | Performed by: NURSE PRACTITIONER

## 2021-12-08 PROCEDURE — 83930 ASSAY OF BLOOD OSMOLALITY: CPT | Performed by: NURSE PRACTITIONER

## 2021-12-08 PROCEDURE — 25000003 PHARM REV CODE 250: Performed by: STUDENT IN AN ORGANIZED HEALTH CARE EDUCATION/TRAINING PROGRAM

## 2021-12-08 PROCEDURE — 99233 PR SUBSEQUENT HOSPITAL CARE,LEVL III: ICD-10-PCS | Mod: ,,, | Performed by: STUDENT IN AN ORGANIZED HEALTH CARE EDUCATION/TRAINING PROGRAM

## 2021-12-08 PROCEDURE — 99900035 HC TECH TIME PER 15 MIN (STAT)

## 2021-12-08 PROCEDURE — 27000221 HC OXYGEN, UP TO 24 HOURS

## 2021-12-08 PROCEDURE — 80053 COMPREHEN METABOLIC PANEL: CPT | Performed by: NURSE PRACTITIONER

## 2021-12-08 PROCEDURE — 25000242 PHARM REV CODE 250 ALT 637 W/ HCPCS: Performed by: STUDENT IN AN ORGANIZED HEALTH CARE EDUCATION/TRAINING PROGRAM

## 2021-12-08 PROCEDURE — 94761 N-INVAS EAR/PLS OXIMETRY MLT: CPT

## 2021-12-08 PROCEDURE — 82436 ASSAY OF URINE CHLORIDE: CPT | Performed by: NURSE PRACTITIONER

## 2021-12-08 PROCEDURE — 25000003 PHARM REV CODE 250: Performed by: INTERNAL MEDICINE

## 2021-12-08 PROCEDURE — 25000003 PHARM REV CODE 250: Performed by: CLINICAL NURSE SPECIALIST

## 2021-12-08 PROCEDURE — 99233 SBSQ HOSP IP/OBS HIGH 50: CPT | Mod: ,,, | Performed by: INTERNAL MEDICINE

## 2021-12-08 PROCEDURE — 63600175 PHARM REV CODE 636 W HCPCS: Performed by: NURSE PRACTITIONER

## 2021-12-08 PROCEDURE — 99233 PR SUBSEQUENT HOSPITAL CARE,LEVL III: ICD-10-PCS | Mod: ,,, | Performed by: INTERNAL MEDICINE

## 2021-12-08 PROCEDURE — 84300 ASSAY OF URINE SODIUM: CPT | Performed by: NURSE PRACTITIONER

## 2021-12-08 PROCEDURE — 97535 SELF CARE MNGMENT TRAINING: CPT

## 2021-12-08 PROCEDURE — 20600001 HC STEP DOWN PRIVATE ROOM

## 2021-12-08 RX ORDER — CALCIUM CARBONATE 200(500)MG
1000 TABLET,CHEWABLE ORAL ONCE
Status: COMPLETED | OUTPATIENT
Start: 2021-12-08 | End: 2021-12-08

## 2021-12-08 RX ORDER — FUROSEMIDE 10 MG/ML
80 INJECTION INTRAMUSCULAR; INTRAVENOUS DAILY
Status: DISCONTINUED | OUTPATIENT
Start: 2021-12-09 | End: 2021-12-10

## 2021-12-08 RX ORDER — HYDROCODONE BITARTRATE AND ACETAMINOPHEN 10; 325 MG/1; MG/1
1 TABLET ORAL EVERY 6 HOURS PRN
Status: DISCONTINUED | OUTPATIENT
Start: 2021-12-08 | End: 2021-12-30 | Stop reason: HOSPADM

## 2021-12-08 RX ADMIN — ASPIRIN 81 MG CHEWABLE TABLET 81 MG: 81 TABLET CHEWABLE at 09:12

## 2021-12-08 RX ADMIN — HEPARIN SODIUM 5000 UNITS: 5000 INJECTION INTRAVENOUS; SUBCUTANEOUS at 05:12

## 2021-12-08 RX ADMIN — INSULIN ASPART 3 UNITS: 100 INJECTION, SOLUTION INTRAVENOUS; SUBCUTANEOUS at 08:12

## 2021-12-08 RX ADMIN — ACETAMINOPHEN 650 MG: 325 TABLET ORAL at 05:12

## 2021-12-08 RX ADMIN — FAMOTIDINE 20 MG: 20 TABLET ORAL at 09:12

## 2021-12-08 RX ADMIN — HYDROCODONE BITARTRATE AND ACETAMINOPHEN 1 TABLET: 10; 325 TABLET ORAL at 08:12

## 2021-12-08 RX ADMIN — ANASTROZOLE 1 MG: 1 TABLET, COATED ORAL at 09:12

## 2021-12-08 RX ADMIN — FUROSEMIDE 80 MG: 40 INJECTION, SOLUTION INTRAMUSCULAR; INTRAVENOUS at 09:12

## 2021-12-08 RX ADMIN — IPRATROPIUM BROMIDE AND ALBUTEROL SULFATE 3 ML: 2.5; .5 SOLUTION RESPIRATORY (INHALATION) at 12:12

## 2021-12-08 RX ADMIN — IPRATROPIUM BROMIDE AND ALBUTEROL SULFATE 3 ML: 2.5; .5 SOLUTION RESPIRATORY (INHALATION) at 07:12

## 2021-12-08 RX ADMIN — Medication 6 MG: at 08:12

## 2021-12-08 RX ADMIN — IPRATROPIUM BROMIDE AND ALBUTEROL SULFATE 3 ML: 2.5; .5 SOLUTION RESPIRATORY (INHALATION) at 03:12

## 2021-12-08 RX ADMIN — INSULIN ASPART 4 UNITS: 100 INJECTION, SOLUTION INTRAVENOUS; SUBCUTANEOUS at 05:12

## 2021-12-08 RX ADMIN — HEPARIN SODIUM 5000 UNITS: 5000 INJECTION INTRAVENOUS; SUBCUTANEOUS at 09:12

## 2021-12-08 RX ADMIN — IPRATROPIUM BROMIDE AND ALBUTEROL SULFATE 3 ML: 2.5; .5 SOLUTION RESPIRATORY (INHALATION) at 08:12

## 2021-12-08 RX ADMIN — INSULIN ASPART 1 UNITS: 100 INJECTION, SOLUTION INTRAVENOUS; SUBCUTANEOUS at 01:12

## 2021-12-08 RX ADMIN — LEVOTHYROXINE SODIUM 50 MCG: 50 TABLET ORAL at 05:12

## 2021-12-08 RX ADMIN — HYDROCODONE BITARTRATE AND ACETAMINOPHEN 1 TABLET: 10; 325 TABLET ORAL at 12:12

## 2021-12-08 RX ADMIN — LIDOCAINE 5% 1 PATCH: 700 PATCH TOPICAL at 12:12

## 2021-12-08 RX ADMIN — CALCIUM CARBONATE (ANTACID) CHEW TAB 500 MG 1000 MG: 500 CHEW TAB at 09:12

## 2021-12-08 RX ADMIN — ATORVASTATIN CALCIUM 80 MG: 20 TABLET, FILM COATED ORAL at 09:12

## 2021-12-09 LAB
ALBUMIN SERPL BCP-MCNC: 1.4 G/DL (ref 3.5–5.2)
ALP SERPL-CCNC: 111 U/L (ref 55–135)
ALT SERPL W/O P-5'-P-CCNC: 20 U/L (ref 10–44)
ANION GAP SERPL CALC-SCNC: 15 MMOL/L (ref 8–16)
ANISOCYTOSIS BLD QL SMEAR: SLIGHT
AST SERPL-CCNC: 20 U/L (ref 10–40)
BASOPHILS # BLD AUTO: 0.03 K/UL (ref 0–0.2)
BASOPHILS NFR BLD: 0.2 % (ref 0–1.9)
BILIRUB SERPL-MCNC: 0.3 MG/DL (ref 0.1–1)
BUN SERPL-MCNC: 52 MG/DL (ref 8–23)
CALCIUM SERPL-MCNC: 7.4 MG/DL (ref 8.7–10.5)
CHLORIDE SERPL-SCNC: 91 MMOL/L (ref 95–110)
CO2 SERPL-SCNC: 22 MMOL/L (ref 23–29)
CREAT SERPL-MCNC: 4.2 MG/DL (ref 0.5–1.4)
CREAT UR-MCNC: 15 MG/DL (ref 15–325)
DIFFERENTIAL METHOD: ABNORMAL
EOSINOPHIL # BLD AUTO: 0.2 K/UL (ref 0–0.5)
EOSINOPHIL NFR BLD: 1.4 % (ref 0–8)
ERYTHROCYTE [DISTWIDTH] IN BLOOD BY AUTOMATED COUNT: 16.2 % (ref 11.5–14.5)
EST. GFR  (AFRICAN AMERICAN): 11.7 ML/MIN/1.73 M^2
EST. GFR  (NON AFRICAN AMERICAN): 10.2 ML/MIN/1.73 M^2
GLUCOSE SERPL-MCNC: 130 MG/DL (ref 70–110)
HCT VFR BLD AUTO: 24.8 % (ref 37–48.5)
HGB BLD-MCNC: 8 G/DL (ref 12–16)
HYPOCHROMIA BLD QL SMEAR: ABNORMAL
IMM GRANULOCYTES # BLD AUTO: 0.17 K/UL (ref 0–0.04)
IMM GRANULOCYTES NFR BLD AUTO: 1.4 % (ref 0–0.5)
LYMPHOCYTES # BLD AUTO: 2.4 K/UL (ref 1–4.8)
LYMPHOCYTES NFR BLD: 20 % (ref 18–48)
MAGNESIUM SERPL-MCNC: 1.4 MG/DL (ref 1.6–2.6)
MCH RBC QN AUTO: 28.8 PG (ref 27–31)
MCHC RBC AUTO-ENTMCNC: 32.3 G/DL (ref 32–36)
MCV RBC AUTO: 89 FL (ref 82–98)
MONOCYTES # BLD AUTO: 2.3 K/UL (ref 0.3–1)
MONOCYTES NFR BLD: 19.4 % (ref 4–15)
NEUTROPHILS # BLD AUTO: 6.9 K/UL (ref 1.8–7.7)
NEUTROPHILS NFR BLD: 57.6 % (ref 38–73)
NRBC BLD-RTO: 0 /100 WBC
OVALOCYTES BLD QL SMEAR: ABNORMAL
PHOSPHATE SERPL-MCNC: 3.8 MG/DL (ref 2.7–4.5)
PLATELET # BLD AUTO: 209 K/UL (ref 150–450)
PLATELET BLD QL SMEAR: ABNORMAL
PMV BLD AUTO: 10.4 FL (ref 9.2–12.9)
POCT GLUCOSE: 139 MG/DL (ref 70–110)
POCT GLUCOSE: 143 MG/DL (ref 70–110)
POCT GLUCOSE: 160 MG/DL (ref 70–110)
POCT GLUCOSE: 162 MG/DL (ref 70–110)
POCT GLUCOSE: 183 MG/DL (ref 70–110)
POCT GLUCOSE: 197 MG/DL (ref 70–110)
POCT GLUCOSE: 238 MG/DL (ref 70–110)
POIKILOCYTOSIS BLD QL SMEAR: SLIGHT
POLYCHROMASIA BLD QL SMEAR: ABNORMAL
POTASSIUM SERPL-SCNC: 3 MMOL/L (ref 3.5–5.1)
PROT SERPL-MCNC: 4.4 G/DL (ref 6–8.4)
PROT UR-MCNC: 60 MG/DL (ref 0–15)
PROT/CREAT UR: 4 MG/G{CREAT} (ref 0–0.2)
RBC # BLD AUTO: 2.78 M/UL (ref 4–5.4)
SODIUM SERPL-SCNC: 128 MMOL/L (ref 136–145)
SPHEROCYTES BLD QL SMEAR: ABNORMAL
WBC # BLD AUTO: 12.04 K/UL (ref 3.9–12.7)

## 2021-12-09 PROCEDURE — 84100 ASSAY OF PHOSPHORUS: CPT | Performed by: NURSE PRACTITIONER

## 2021-12-09 PROCEDURE — 85025 COMPLETE CBC W/AUTO DIFF WBC: CPT | Performed by: STUDENT IN AN ORGANIZED HEALTH CARE EDUCATION/TRAINING PROGRAM

## 2021-12-09 PROCEDURE — 94640 AIRWAY INHALATION TREATMENT: CPT

## 2021-12-09 PROCEDURE — 99233 PR SUBSEQUENT HOSPITAL CARE,LEVL III: ICD-10-PCS | Mod: ,,, | Performed by: INTERNAL MEDICINE

## 2021-12-09 PROCEDURE — 63600175 PHARM REV CODE 636 W HCPCS: Performed by: NURSE PRACTITIONER

## 2021-12-09 PROCEDURE — 83735 ASSAY OF MAGNESIUM: CPT | Performed by: NURSE PRACTITIONER

## 2021-12-09 PROCEDURE — 25000003 PHARM REV CODE 250: Performed by: CLINICAL NURSE SPECIALIST

## 2021-12-09 PROCEDURE — 25000003 PHARM REV CODE 250: Performed by: STUDENT IN AN ORGANIZED HEALTH CARE EDUCATION/TRAINING PROGRAM

## 2021-12-09 PROCEDURE — 80053 COMPREHEN METABOLIC PANEL: CPT | Performed by: NURSE PRACTITIONER

## 2021-12-09 PROCEDURE — 99232 SBSQ HOSP IP/OBS MODERATE 35: CPT | Mod: ,,, | Performed by: STUDENT IN AN ORGANIZED HEALTH CARE EDUCATION/TRAINING PROGRAM

## 2021-12-09 PROCEDURE — 99900035 HC TECH TIME PER 15 MIN (STAT)

## 2021-12-09 PROCEDURE — 92526 ORAL FUNCTION THERAPY: CPT

## 2021-12-09 PROCEDURE — 25000003 PHARM REV CODE 250: Performed by: INTERNAL MEDICINE

## 2021-12-09 PROCEDURE — 27000221 HC OXYGEN, UP TO 24 HOURS

## 2021-12-09 PROCEDURE — 94668 MNPJ CHEST WALL SBSQ: CPT

## 2021-12-09 PROCEDURE — 99233 SBSQ HOSP IP/OBS HIGH 50: CPT | Mod: ,,, | Performed by: INTERNAL MEDICINE

## 2021-12-09 PROCEDURE — 99232 PR SUBSEQUENT HOSPITAL CARE,LEVL II: ICD-10-PCS | Mod: ,,, | Performed by: STUDENT IN AN ORGANIZED HEALTH CARE EDUCATION/TRAINING PROGRAM

## 2021-12-09 PROCEDURE — 94761 N-INVAS EAR/PLS OXIMETRY MLT: CPT

## 2021-12-09 PROCEDURE — 25000242 PHARM REV CODE 250 ALT 637 W/ HCPCS: Performed by: STUDENT IN AN ORGANIZED HEALTH CARE EDUCATION/TRAINING PROGRAM

## 2021-12-09 PROCEDURE — 36415 COLL VENOUS BLD VENIPUNCTURE: CPT | Performed by: NURSE PRACTITIONER

## 2021-12-09 PROCEDURE — 20600001 HC STEP DOWN PRIVATE ROOM

## 2021-12-09 PROCEDURE — 94667 MNPJ CHEST WALL 1ST: CPT

## 2021-12-09 PROCEDURE — 82570 ASSAY OF URINE CREATININE: CPT

## 2021-12-09 PROCEDURE — 63600175 PHARM REV CODE 636 W HCPCS: Performed by: CLINICAL NURSE SPECIALIST

## 2021-12-09 RX ORDER — LIDOCAINE 50 MG/G
1 PATCH TOPICAL
Status: DISCONTINUED | OUTPATIENT
Start: 2021-12-09 | End: 2021-12-30 | Stop reason: HOSPADM

## 2021-12-09 RX ORDER — SUMATRIPTAN 50 MG/1
100 TABLET, FILM COATED ORAL ONCE
Status: COMPLETED | OUTPATIENT
Start: 2021-12-09 | End: 2021-12-09

## 2021-12-09 RX ADMIN — ANASTROZOLE 1 MG: 1 TABLET, COATED ORAL at 10:12

## 2021-12-09 RX ADMIN — ATORVASTATIN CALCIUM 80 MG: 20 TABLET, FILM COATED ORAL at 10:12

## 2021-12-09 RX ADMIN — ASPIRIN 81 MG CHEWABLE TABLET 81 MG: 81 TABLET CHEWABLE at 10:12

## 2021-12-09 RX ADMIN — IPRATROPIUM BROMIDE AND ALBUTEROL SULFATE 3 ML: 2.5; .5 SOLUTION RESPIRATORY (INHALATION) at 07:12

## 2021-12-09 RX ADMIN — LEVOTHYROXINE SODIUM 50 MCG: 50 TABLET ORAL at 05:12

## 2021-12-09 RX ADMIN — HEPARIN SODIUM 5000 UNITS: 5000 INJECTION INTRAVENOUS; SUBCUTANEOUS at 10:12

## 2021-12-09 RX ADMIN — SUMATRIPTAN SUCCINATE 100 MG: 50 TABLET ORAL at 05:12

## 2021-12-09 RX ADMIN — FAMOTIDINE 20 MG: 20 TABLET ORAL at 10:12

## 2021-12-09 RX ADMIN — IPRATROPIUM BROMIDE AND ALBUTEROL SULFATE 3 ML: 2.5; .5 SOLUTION RESPIRATORY (INHALATION) at 08:12

## 2021-12-09 RX ADMIN — FUROSEMIDE 80 MG: 40 INJECTION, SOLUTION INTRAMUSCULAR; INTRAVENOUS at 10:12

## 2021-12-09 RX ADMIN — INSULIN ASPART 2 UNITS: 100 INJECTION, SOLUTION INTRAVENOUS; SUBCUTANEOUS at 08:12

## 2021-12-09 RX ADMIN — HEPARIN SODIUM 5000 UNITS: 5000 INJECTION INTRAVENOUS; SUBCUTANEOUS at 03:12

## 2021-12-09 RX ADMIN — IPRATROPIUM BROMIDE AND ALBUTEROL SULFATE 3 ML: 2.5; .5 SOLUTION RESPIRATORY (INHALATION) at 12:12

## 2021-12-09 RX ADMIN — IPRATROPIUM BROMIDE AND ALBUTEROL SULFATE 3 ML: 2.5; .5 SOLUTION RESPIRATORY (INHALATION) at 04:12

## 2021-12-09 RX ADMIN — INSULIN ASPART 2 UNITS: 100 INJECTION, SOLUTION INTRAVENOUS; SUBCUTANEOUS at 04:12

## 2021-12-09 RX ADMIN — HYDROCODONE BITARTRATE AND ACETAMINOPHEN 1 TABLET: 10; 325 TABLET ORAL at 10:12

## 2021-12-09 RX ADMIN — HEPARIN SODIUM 5000 UNITS: 5000 INJECTION INTRAVENOUS; SUBCUTANEOUS at 05:12

## 2021-12-09 RX ADMIN — INSULIN ASPART 1 UNITS: 100 INJECTION, SOLUTION INTRAVENOUS; SUBCUTANEOUS at 11:12

## 2021-12-09 RX ADMIN — LIDOCAINE 5% 1 PATCH: 700 PATCH TOPICAL at 03:12

## 2021-12-09 RX ADMIN — LIDOCAINE 1 PATCH: 50 PATCH CUTANEOUS at 08:12

## 2021-12-10 LAB
ALBUMIN SERPL BCP-MCNC: 1.5 G/DL (ref 3.5–5.2)
ANION GAP SERPL CALC-SCNC: 15 MMOL/L (ref 8–16)
BUN SERPL-MCNC: 58 MG/DL (ref 8–23)
CALCIUM SERPL-MCNC: 7.6 MG/DL (ref 8.7–10.5)
CHLORIDE SERPL-SCNC: 89 MMOL/L (ref 95–110)
CO2 SERPL-SCNC: 22 MMOL/L (ref 23–29)
CREAT SERPL-MCNC: 4.2 MG/DL (ref 0.5–1.4)
EST. GFR  (AFRICAN AMERICAN): 11.7 ML/MIN/1.73 M^2
EST. GFR  (NON AFRICAN AMERICAN): 10.2 ML/MIN/1.73 M^2
GLUCOSE SERPL-MCNC: 134 MG/DL (ref 70–110)
PHOSPHATE SERPL-MCNC: 3.4 MG/DL (ref 2.7–4.5)
POCT GLUCOSE: 122 MG/DL (ref 70–110)
POCT GLUCOSE: 163 MG/DL (ref 70–110)
POCT GLUCOSE: 188 MG/DL (ref 70–110)
POCT GLUCOSE: 196 MG/DL (ref 70–110)
POCT GLUCOSE: 204 MG/DL (ref 70–110)
POTASSIUM SERPL-SCNC: 3 MMOL/L (ref 3.5–5.1)
SODIUM SERPL-SCNC: 126 MMOL/L (ref 136–145)

## 2021-12-10 PROCEDURE — 94640 AIRWAY INHALATION TREATMENT: CPT

## 2021-12-10 PROCEDURE — 99900035 HC TECH TIME PER 15 MIN (STAT)

## 2021-12-10 PROCEDURE — 99233 PR SUBSEQUENT HOSPITAL CARE,LEVL III: ICD-10-PCS | Mod: ,,, | Performed by: INTERNAL MEDICINE

## 2021-12-10 PROCEDURE — 27000221 HC OXYGEN, UP TO 24 HOURS

## 2021-12-10 PROCEDURE — 63600175 PHARM REV CODE 636 W HCPCS: Performed by: CLINICAL NURSE SPECIALIST

## 2021-12-10 PROCEDURE — 25000003 PHARM REV CODE 250: Performed by: INTERNAL MEDICINE

## 2021-12-10 PROCEDURE — 94668 MNPJ CHEST WALL SBSQ: CPT

## 2021-12-10 PROCEDURE — 99232 PR SUBSEQUENT HOSPITAL CARE,LEVL II: ICD-10-PCS | Mod: ,,, | Performed by: STUDENT IN AN ORGANIZED HEALTH CARE EDUCATION/TRAINING PROGRAM

## 2021-12-10 PROCEDURE — 25000242 PHARM REV CODE 250 ALT 637 W/ HCPCS: Performed by: STUDENT IN AN ORGANIZED HEALTH CARE EDUCATION/TRAINING PROGRAM

## 2021-12-10 PROCEDURE — 36415 COLL VENOUS BLD VENIPUNCTURE: CPT | Performed by: STUDENT IN AN ORGANIZED HEALTH CARE EDUCATION/TRAINING PROGRAM

## 2021-12-10 PROCEDURE — 25000003 PHARM REV CODE 250: Performed by: STUDENT IN AN ORGANIZED HEALTH CARE EDUCATION/TRAINING PROGRAM

## 2021-12-10 PROCEDURE — 97530 THERAPEUTIC ACTIVITIES: CPT

## 2021-12-10 PROCEDURE — 99232 SBSQ HOSP IP/OBS MODERATE 35: CPT | Mod: ,,, | Performed by: STUDENT IN AN ORGANIZED HEALTH CARE EDUCATION/TRAINING PROGRAM

## 2021-12-10 PROCEDURE — 94761 N-INVAS EAR/PLS OXIMETRY MLT: CPT

## 2021-12-10 PROCEDURE — 99233 SBSQ HOSP IP/OBS HIGH 50: CPT | Mod: ,,, | Performed by: INTERNAL MEDICINE

## 2021-12-10 PROCEDURE — 25000003 PHARM REV CODE 250: Performed by: CLINICAL NURSE SPECIALIST

## 2021-12-10 PROCEDURE — 20600001 HC STEP DOWN PRIVATE ROOM

## 2021-12-10 PROCEDURE — 80069 RENAL FUNCTION PANEL: CPT | Performed by: STUDENT IN AN ORGANIZED HEALTH CARE EDUCATION/TRAINING PROGRAM

## 2021-12-10 RX ADMIN — FAMOTIDINE 20 MG: 20 TABLET ORAL at 10:12

## 2021-12-10 RX ADMIN — IPRATROPIUM BROMIDE AND ALBUTEROL SULFATE 3 ML: 2.5; .5 SOLUTION RESPIRATORY (INHALATION) at 01:12

## 2021-12-10 RX ADMIN — LEVOTHYROXINE SODIUM 50 MCG: 50 TABLET ORAL at 05:12

## 2021-12-10 RX ADMIN — IPRATROPIUM BROMIDE AND ALBUTEROL SULFATE 3 ML: 2.5; .5 SOLUTION RESPIRATORY (INHALATION) at 12:12

## 2021-12-10 RX ADMIN — IPRATROPIUM BROMIDE AND ALBUTEROL SULFATE 3 ML: 2.5; .5 SOLUTION RESPIRATORY (INHALATION) at 08:12

## 2021-12-10 RX ADMIN — LIDOCAINE 1 PATCH: 50 PATCH CUTANEOUS at 08:12

## 2021-12-10 RX ADMIN — APIXABAN 2.5 MG: 2.5 TABLET, FILM COATED ORAL at 08:12

## 2021-12-10 RX ADMIN — IPRATROPIUM BROMIDE AND ALBUTEROL SULFATE 3 ML: 2.5; .5 SOLUTION RESPIRATORY (INHALATION) at 04:12

## 2021-12-10 RX ADMIN — ASPIRIN 81 MG CHEWABLE TABLET 81 MG: 81 TABLET CHEWABLE at 10:12

## 2021-12-10 RX ADMIN — IPRATROPIUM BROMIDE AND ALBUTEROL SULFATE 3 ML: 2.5; .5 SOLUTION RESPIRATORY (INHALATION) at 03:12

## 2021-12-10 RX ADMIN — ATORVASTATIN CALCIUM 80 MG: 20 TABLET, FILM COATED ORAL at 10:12

## 2021-12-10 RX ADMIN — ANASTROZOLE 1 MG: 1 TABLET, COATED ORAL at 10:12

## 2021-12-10 RX ADMIN — HEPARIN SODIUM 5000 UNITS: 5000 INJECTION INTRAVENOUS; SUBCUTANEOUS at 03:12

## 2021-12-10 RX ADMIN — INSULIN ASPART 1 UNITS: 100 INJECTION, SOLUTION INTRAVENOUS; SUBCUTANEOUS at 09:12

## 2021-12-10 RX ADMIN — HYDROCODONE BITARTRATE AND ACETAMINOPHEN 1 TABLET: 10; 325 TABLET ORAL at 11:12

## 2021-12-10 RX ADMIN — HYDROCODONE BITARTRATE AND ACETAMINOPHEN 1 TABLET: 10; 325 TABLET ORAL at 10:12

## 2021-12-10 RX ADMIN — HEPARIN SODIUM 5000 UNITS: 5000 INJECTION INTRAVENOUS; SUBCUTANEOUS at 05:12

## 2021-12-10 RX ADMIN — INSULIN ASPART 2 UNITS: 100 INJECTION, SOLUTION INTRAVENOUS; SUBCUTANEOUS at 04:12

## 2021-12-11 LAB
ALBUMIN SERPL BCP-MCNC: 1.6 G/DL (ref 3.5–5.2)
ALBUMIN SERPL BCP-MCNC: 1.7 G/DL (ref 3.5–5.2)
ANION GAP SERPL CALC-SCNC: 14 MMOL/L (ref 8–16)
ANION GAP SERPL CALC-SCNC: 14 MMOL/L (ref 8–16)
ANION GAP SERPL CALC-SCNC: 16 MMOL/L (ref 8–16)
BACTERIA BLD CULT: NORMAL
BACTERIA BLD CULT: NORMAL
BUN SERPL-MCNC: 57 MG/DL (ref 8–23)
BUN SERPL-MCNC: 57 MG/DL (ref 8–23)
BUN SERPL-MCNC: 60 MG/DL (ref 8–23)
CALCIUM SERPL-MCNC: 7.5 MG/DL (ref 8.7–10.5)
CALCIUM SERPL-MCNC: 7.5 MG/DL (ref 8.7–10.5)
CALCIUM SERPL-MCNC: 7.7 MG/DL (ref 8.7–10.5)
CHLORIDE SERPL-SCNC: 83 MMOL/L (ref 95–110)
CHLORIDE SERPL-SCNC: 84 MMOL/L (ref 95–110)
CHLORIDE SERPL-SCNC: 86 MMOL/L (ref 95–110)
CHLORIDE UR-SCNC: 25 MMOL/L (ref 25–200)
CO2 SERPL-SCNC: 19 MMOL/L (ref 23–29)
CO2 SERPL-SCNC: 19 MMOL/L (ref 23–29)
CO2 SERPL-SCNC: 22 MMOL/L (ref 23–29)
CREAT SERPL-MCNC: 4 MG/DL (ref 0.5–1.4)
CREAT SERPL-MCNC: 4.1 MG/DL (ref 0.5–1.4)
CREAT SERPL-MCNC: 4.2 MG/DL (ref 0.5–1.4)
EST. GFR  (AFRICAN AMERICAN): 11.7 ML/MIN/1.73 M^2
EST. GFR  (AFRICAN AMERICAN): 12.1 ML/MIN/1.73 M^2
EST. GFR  (AFRICAN AMERICAN): 12.4 ML/MIN/1.73 M^2
EST. GFR  (NON AFRICAN AMERICAN): 10.2 ML/MIN/1.73 M^2
EST. GFR  (NON AFRICAN AMERICAN): 10.5 ML/MIN/1.73 M^2
EST. GFR  (NON AFRICAN AMERICAN): 10.8 ML/MIN/1.73 M^2
GLUCOSE SERPL-MCNC: 164 MG/DL (ref 70–110)
GLUCOSE SERPL-MCNC: 174 MG/DL (ref 70–110)
GLUCOSE SERPL-MCNC: 177 MG/DL (ref 70–110)
OSMOLALITY SERPL: 280 MOSM/KG (ref 275–295)
OSMOLALITY UR: 181 MOSM/KG (ref 50–1200)
PHOSPHATE SERPL-MCNC: 3.3 MG/DL (ref 2.7–4.5)
PHOSPHATE SERPL-MCNC: 3.6 MG/DL (ref 2.7–4.5)
POCT GLUCOSE: 176 MG/DL (ref 70–110)
POCT GLUCOSE: 191 MG/DL (ref 70–110)
POCT GLUCOSE: 194 MG/DL (ref 70–110)
POCT GLUCOSE: 253 MG/DL (ref 70–110)
POTASSIUM SERPL-SCNC: 3.2 MMOL/L (ref 3.5–5.1)
POTASSIUM SERPL-SCNC: 3.3 MMOL/L (ref 3.5–5.1)
POTASSIUM SERPL-SCNC: 3.5 MMOL/L (ref 3.5–5.1)
SODIUM SERPL-SCNC: 117 MMOL/L (ref 136–145)
SODIUM SERPL-SCNC: 119 MMOL/L (ref 136–145)
SODIUM SERPL-SCNC: 121 MMOL/L (ref 136–145)
SODIUM UR-SCNC: 29 MMOL/L (ref 20–250)

## 2021-12-11 PROCEDURE — 82436 ASSAY OF URINE CHLORIDE: CPT | Performed by: INTERNAL MEDICINE

## 2021-12-11 PROCEDURE — 94640 AIRWAY INHALATION TREATMENT: CPT

## 2021-12-11 PROCEDURE — 25000003 PHARM REV CODE 250: Performed by: INTERNAL MEDICINE

## 2021-12-11 PROCEDURE — 36415 COLL VENOUS BLD VENIPUNCTURE: CPT | Performed by: STUDENT IN AN ORGANIZED HEALTH CARE EDUCATION/TRAINING PROGRAM

## 2021-12-11 PROCEDURE — 99232 PR SUBSEQUENT HOSPITAL CARE,LEVL II: ICD-10-PCS | Mod: ,,, | Performed by: INTERNAL MEDICINE

## 2021-12-11 PROCEDURE — 80069 RENAL FUNCTION PANEL: CPT | Performed by: STUDENT IN AN ORGANIZED HEALTH CARE EDUCATION/TRAINING PROGRAM

## 2021-12-11 PROCEDURE — 25000003 PHARM REV CODE 250: Performed by: CLINICAL NURSE SPECIALIST

## 2021-12-11 PROCEDURE — 25000242 PHARM REV CODE 250 ALT 637 W/ HCPCS: Performed by: STUDENT IN AN ORGANIZED HEALTH CARE EDUCATION/TRAINING PROGRAM

## 2021-12-11 PROCEDURE — 84300 ASSAY OF URINE SODIUM: CPT | Performed by: INTERNAL MEDICINE

## 2021-12-11 PROCEDURE — 63600175 PHARM REV CODE 636 W HCPCS: Performed by: PHYSICIAN ASSISTANT

## 2021-12-11 PROCEDURE — 80048 BASIC METABOLIC PNL TOTAL CA: CPT | Performed by: INTERNAL MEDICINE

## 2021-12-11 PROCEDURE — 83935 ASSAY OF URINE OSMOLALITY: CPT | Performed by: INTERNAL MEDICINE

## 2021-12-11 PROCEDURE — 20600001 HC STEP DOWN PRIVATE ROOM

## 2021-12-11 PROCEDURE — 36415 COLL VENOUS BLD VENIPUNCTURE: CPT | Performed by: INTERNAL MEDICINE

## 2021-12-11 PROCEDURE — 25000003 PHARM REV CODE 250: Performed by: STUDENT IN AN ORGANIZED HEALTH CARE EDUCATION/TRAINING PROGRAM

## 2021-12-11 PROCEDURE — 80069 RENAL FUNCTION PANEL: CPT | Mod: 91 | Performed by: INTERNAL MEDICINE

## 2021-12-11 PROCEDURE — 83930 ASSAY OF BLOOD OSMOLALITY: CPT | Performed by: INTERNAL MEDICINE

## 2021-12-11 PROCEDURE — 99232 SBSQ HOSP IP/OBS MODERATE 35: CPT | Mod: ,,, | Performed by: STUDENT IN AN ORGANIZED HEALTH CARE EDUCATION/TRAINING PROGRAM

## 2021-12-11 PROCEDURE — 99232 SBSQ HOSP IP/OBS MODERATE 35: CPT | Mod: ,,, | Performed by: INTERNAL MEDICINE

## 2021-12-11 PROCEDURE — 94668 MNPJ CHEST WALL SBSQ: CPT

## 2021-12-11 PROCEDURE — 99232 PR SUBSEQUENT HOSPITAL CARE,LEVL II: ICD-10-PCS | Mod: ,,, | Performed by: STUDENT IN AN ORGANIZED HEALTH CARE EDUCATION/TRAINING PROGRAM

## 2021-12-11 PROCEDURE — 94761 N-INVAS EAR/PLS OXIMETRY MLT: CPT

## 2021-12-11 PROCEDURE — 63600175 PHARM REV CODE 636 W HCPCS: Performed by: INTERNAL MEDICINE

## 2021-12-11 RX ORDER — 3% SODIUM CHLORIDE 3 G/100ML
100 INJECTION, SOLUTION INTRAVENOUS CONTINUOUS
Status: DISPENSED | OUTPATIENT
Start: 2021-12-11 | End: 2021-12-12

## 2021-12-11 RX ORDER — FUROSEMIDE 10 MG/ML
40 INJECTION INTRAMUSCULAR; INTRAVENOUS ONCE
Status: COMPLETED | OUTPATIENT
Start: 2021-12-11 | End: 2021-12-11

## 2021-12-11 RX ORDER — SUMATRIPTAN 50 MG/1
100 TABLET, FILM COATED ORAL ONCE
Status: COMPLETED | OUTPATIENT
Start: 2021-12-11 | End: 2021-12-11

## 2021-12-11 RX ORDER — SODIUM CHLORIDE 9 MG/ML
INJECTION, SOLUTION INTRAVENOUS CONTINUOUS
Status: ACTIVE | OUTPATIENT
Start: 2021-12-11 | End: 2021-12-11

## 2021-12-11 RX ADMIN — ATORVASTATIN CALCIUM 80 MG: 20 TABLET, FILM COATED ORAL at 08:12

## 2021-12-11 RX ADMIN — INSULIN ASPART 2 UNITS: 100 INJECTION, SOLUTION INTRAVENOUS; SUBCUTANEOUS at 12:12

## 2021-12-11 RX ADMIN — IPRATROPIUM BROMIDE AND ALBUTEROL SULFATE 3 ML: 2.5; .5 SOLUTION RESPIRATORY (INHALATION) at 05:12

## 2021-12-11 RX ADMIN — FAMOTIDINE 20 MG: 20 TABLET ORAL at 08:12

## 2021-12-11 RX ADMIN — IPRATROPIUM BROMIDE AND ALBUTEROL SULFATE 3 ML: 2.5; .5 SOLUTION RESPIRATORY (INHALATION) at 08:12

## 2021-12-11 RX ADMIN — APIXABAN 2.5 MG: 2.5 TABLET, FILM COATED ORAL at 08:12

## 2021-12-11 RX ADMIN — ASPIRIN 81 MG CHEWABLE TABLET 81 MG: 81 TABLET CHEWABLE at 08:12

## 2021-12-11 RX ADMIN — SUMATRIPTAN SUCCINATE 100 MG: 50 TABLET ORAL at 09:12

## 2021-12-11 RX ADMIN — LEVOTHYROXINE SODIUM 50 MCG: 50 TABLET ORAL at 05:12

## 2021-12-11 RX ADMIN — IPRATROPIUM BROMIDE AND ALBUTEROL SULFATE 3 ML: 2.5; .5 SOLUTION RESPIRATORY (INHALATION) at 11:12

## 2021-12-11 RX ADMIN — IPRATROPIUM BROMIDE AND ALBUTEROL SULFATE 3 ML: 2.5; .5 SOLUTION RESPIRATORY (INHALATION) at 03:12

## 2021-12-11 RX ADMIN — INSULIN ASPART 6 UNITS: 100 INJECTION, SOLUTION INTRAVENOUS; SUBCUTANEOUS at 04:12

## 2021-12-11 RX ADMIN — HYDROCODONE BITARTRATE AND ACETAMINOPHEN 1 TABLET: 10; 325 TABLET ORAL at 05:12

## 2021-12-11 RX ADMIN — INSULIN ASPART 2 UNITS: 100 INJECTION, SOLUTION INTRAVENOUS; SUBCUTANEOUS at 08:12

## 2021-12-11 RX ADMIN — ANASTROZOLE 1 MG: 1 TABLET, COATED ORAL at 08:12

## 2021-12-11 RX ADMIN — IPRATROPIUM BROMIDE AND ALBUTEROL SULFATE 3 ML: 2.5; .5 SOLUTION RESPIRATORY (INHALATION) at 12:12

## 2021-12-11 RX ADMIN — SODIUM CHLORIDE: 0.9 INJECTION, SOLUTION INTRAVENOUS at 05:12

## 2021-12-11 RX ADMIN — HYDRALAZINE HYDROCHLORIDE 10 MG: 20 INJECTION, SOLUTION INTRAMUSCULAR; INTRAVENOUS at 08:12

## 2021-12-11 RX ADMIN — FUROSEMIDE 40 MG: 40 INJECTION, SOLUTION INTRAMUSCULAR; INTRAVENOUS at 12:12

## 2021-12-11 RX ADMIN — INSULIN ASPART 1 UNITS: 100 INJECTION, SOLUTION INTRAVENOUS; SUBCUTANEOUS at 10:12

## 2021-12-12 LAB
ALBUMIN SERPL BCP-MCNC: 1.5 G/DL (ref 3.5–5.2)
ALBUMIN SERPL BCP-MCNC: 1.6 G/DL (ref 3.5–5.2)
ANION GAP SERPL CALC-SCNC: 11 MMOL/L (ref 8–16)
ANION GAP SERPL CALC-SCNC: 15 MMOL/L (ref 8–16)
BUN SERPL-MCNC: 57 MG/DL (ref 8–23)
BUN SERPL-MCNC: 57 MG/DL (ref 8–23)
CALCIUM SERPL-MCNC: 7 MG/DL (ref 8.7–10.5)
CALCIUM SERPL-MCNC: 7.2 MG/DL (ref 8.7–10.5)
CHLORIDE SERPL-SCNC: 86 MMOL/L (ref 95–110)
CHLORIDE SERPL-SCNC: 92 MMOL/L (ref 95–110)
CO2 SERPL-SCNC: 20 MMOL/L (ref 23–29)
CO2 SERPL-SCNC: 21 MMOL/L (ref 23–29)
CREAT SERPL-MCNC: 4.1 MG/DL (ref 0.5–1.4)
CREAT SERPL-MCNC: 4.5 MG/DL (ref 0.5–1.4)
EST. GFR  (AFRICAN AMERICAN): 10.8 ML/MIN/1.73 M^2
EST. GFR  (AFRICAN AMERICAN): 12.1 ML/MIN/1.73 M^2
EST. GFR  (NON AFRICAN AMERICAN): 10.5 ML/MIN/1.73 M^2
EST. GFR  (NON AFRICAN AMERICAN): 9.4 ML/MIN/1.73 M^2
GLUCOSE SERPL-MCNC: 138 MG/DL (ref 70–110)
GLUCOSE SERPL-MCNC: 191 MG/DL (ref 70–110)
PHOSPHATE SERPL-MCNC: 3.2 MG/DL (ref 2.7–4.5)
PHOSPHATE SERPL-MCNC: 3.6 MG/DL (ref 2.7–4.5)
POCT GLUCOSE: 176 MG/DL (ref 70–110)
POCT GLUCOSE: 219 MG/DL (ref 70–110)
POCT GLUCOSE: 236 MG/DL (ref 70–110)
POTASSIUM SERPL-SCNC: 3.2 MMOL/L (ref 3.5–5.1)
POTASSIUM SERPL-SCNC: 3.4 MMOL/L (ref 3.5–5.1)
SODIUM SERPL-SCNC: 122 MMOL/L (ref 136–145)
SODIUM SERPL-SCNC: 123 MMOL/L (ref 136–145)

## 2021-12-12 PROCEDURE — 25000242 PHARM REV CODE 250 ALT 637 W/ HCPCS: Performed by: STUDENT IN AN ORGANIZED HEALTH CARE EDUCATION/TRAINING PROGRAM

## 2021-12-12 PROCEDURE — 25000003 PHARM REV CODE 250: Performed by: CLINICAL NURSE SPECIALIST

## 2021-12-12 PROCEDURE — 25000003 PHARM REV CODE 250: Performed by: INTERNAL MEDICINE

## 2021-12-12 PROCEDURE — 94761 N-INVAS EAR/PLS OXIMETRY MLT: CPT

## 2021-12-12 PROCEDURE — 94664 DEMO&/EVAL PT USE INHALER: CPT

## 2021-12-12 PROCEDURE — 25000003 PHARM REV CODE 250: Performed by: STUDENT IN AN ORGANIZED HEALTH CARE EDUCATION/TRAINING PROGRAM

## 2021-12-12 PROCEDURE — 99233 PR SUBSEQUENT HOSPITAL CARE,LEVL III: ICD-10-PCS | Mod: ,,, | Performed by: NURSE PRACTITIONER

## 2021-12-12 PROCEDURE — 99233 SBSQ HOSP IP/OBS HIGH 50: CPT | Mod: ,,, | Performed by: NURSE PRACTITIONER

## 2021-12-12 PROCEDURE — 99232 SBSQ HOSP IP/OBS MODERATE 35: CPT | Mod: ,,, | Performed by: STUDENT IN AN ORGANIZED HEALTH CARE EDUCATION/TRAINING PROGRAM

## 2021-12-12 PROCEDURE — 99900035 HC TECH TIME PER 15 MIN (STAT)

## 2021-12-12 PROCEDURE — 20600001 HC STEP DOWN PRIVATE ROOM

## 2021-12-12 PROCEDURE — 94640 AIRWAY INHALATION TREATMENT: CPT

## 2021-12-12 PROCEDURE — 80069 RENAL FUNCTION PANEL: CPT | Performed by: NURSE PRACTITIONER

## 2021-12-12 PROCEDURE — 80069 RENAL FUNCTION PANEL: CPT | Mod: 91 | Performed by: STUDENT IN AN ORGANIZED HEALTH CARE EDUCATION/TRAINING PROGRAM

## 2021-12-12 PROCEDURE — 99233 SBSQ HOSP IP/OBS HIGH 50: CPT | Mod: ,,, | Performed by: INTERNAL MEDICINE

## 2021-12-12 PROCEDURE — 36415 COLL VENOUS BLD VENIPUNCTURE: CPT | Performed by: STUDENT IN AN ORGANIZED HEALTH CARE EDUCATION/TRAINING PROGRAM

## 2021-12-12 PROCEDURE — 99232 PR SUBSEQUENT HOSPITAL CARE,LEVL II: ICD-10-PCS | Mod: ,,, | Performed by: STUDENT IN AN ORGANIZED HEALTH CARE EDUCATION/TRAINING PROGRAM

## 2021-12-12 PROCEDURE — 27000646 HC AEROBIKA DEVICE

## 2021-12-12 PROCEDURE — 94668 MNPJ CHEST WALL SBSQ: CPT

## 2021-12-12 PROCEDURE — 99233 PR SUBSEQUENT HOSPITAL CARE,LEVL III: ICD-10-PCS | Mod: ,,, | Performed by: INTERNAL MEDICINE

## 2021-12-12 RX ORDER — SUMATRIPTAN 50 MG/1
100 TABLET, FILM COATED ORAL DAILY PRN
Status: DISCONTINUED | OUTPATIENT
Start: 2021-12-13 | End: 2021-12-30 | Stop reason: HOSPADM

## 2021-12-12 RX ORDER — SUMATRIPTAN 50 MG/1
50 TABLET, FILM COATED ORAL ONCE
Status: COMPLETED | OUTPATIENT
Start: 2021-12-12 | End: 2021-12-12

## 2021-12-12 RX ORDER — SUMATRIPTAN 50 MG/1
50 TABLET, FILM COATED ORAL DAILY PRN
Status: DISCONTINUED | OUTPATIENT
Start: 2021-12-12 | End: 2021-12-12

## 2021-12-12 RX ADMIN — APIXABAN 2.5 MG: 2.5 TABLET, FILM COATED ORAL at 09:12

## 2021-12-12 RX ADMIN — SUMATRIPTAN SUCCINATE 50 MG: 50 TABLET ORAL at 06:12

## 2021-12-12 RX ADMIN — FAMOTIDINE 20 MG: 20 TABLET ORAL at 08:12

## 2021-12-12 RX ADMIN — IPRATROPIUM BROMIDE AND ALBUTEROL SULFATE 3 ML: 2.5; .5 SOLUTION RESPIRATORY (INHALATION) at 03:12

## 2021-12-12 RX ADMIN — SUMATRIPTAN SUCCINATE 50 MG: 50 TABLET ORAL at 03:12

## 2021-12-12 RX ADMIN — IPRATROPIUM BROMIDE AND ALBUTEROL SULFATE 3 ML: 2.5; .5 SOLUTION RESPIRATORY (INHALATION) at 07:12

## 2021-12-12 RX ADMIN — ANASTROZOLE 1 MG: 1 TABLET, COATED ORAL at 08:12

## 2021-12-12 RX ADMIN — ATORVASTATIN CALCIUM 80 MG: 20 TABLET, FILM COATED ORAL at 08:12

## 2021-12-12 RX ADMIN — LEVOTHYROXINE SODIUM 50 MCG: 50 TABLET ORAL at 06:12

## 2021-12-12 RX ADMIN — ASPIRIN 81 MG CHEWABLE TABLET 81 MG: 81 TABLET CHEWABLE at 08:12

## 2021-12-12 RX ADMIN — HYDROCODONE BITARTRATE AND ACETAMINOPHEN 1 TABLET: 10; 325 TABLET ORAL at 11:12

## 2021-12-12 RX ADMIN — INSULIN ASPART 4 UNITS: 100 INJECTION, SOLUTION INTRAVENOUS; SUBCUTANEOUS at 12:12

## 2021-12-12 RX ADMIN — IPRATROPIUM BROMIDE AND ALBUTEROL SULFATE 3 ML: 2.5; .5 SOLUTION RESPIRATORY (INHALATION) at 11:12

## 2021-12-12 RX ADMIN — APIXABAN 2.5 MG: 2.5 TABLET, FILM COATED ORAL at 08:12

## 2021-12-12 RX ADMIN — LIDOCAINE 1 PATCH: 50 PATCH CUTANEOUS at 09:12

## 2021-12-12 RX ADMIN — INSULIN ASPART 4 UNITS: 100 INJECTION, SOLUTION INTRAVENOUS; SUBCUTANEOUS at 06:12

## 2021-12-12 RX ADMIN — INSULIN ASPART 2 UNITS: 100 INJECTION, SOLUTION INTRAVENOUS; SUBCUTANEOUS at 08:12

## 2021-12-13 LAB
ALBUMIN SERPL BCP-MCNC: 1.4 G/DL (ref 3.5–5.2)
ALBUMIN SERPL BCP-MCNC: 1.6 G/DL (ref 3.5–5.2)
ALBUMIN SERPL BCP-MCNC: 1.7 G/DL (ref 3.5–5.2)
ANION GAP SERPL CALC-SCNC: 11 MMOL/L (ref 8–16)
ANION GAP SERPL CALC-SCNC: 13 MMOL/L (ref 8–16)
ANION GAP SERPL CALC-SCNC: 14 MMOL/L (ref 8–16)
BASOPHILS # BLD AUTO: 0.03 K/UL (ref 0–0.2)
BASOPHILS NFR BLD: 0.4 % (ref 0–1.9)
BUN SERPL-MCNC: 50 MG/DL (ref 8–23)
BUN SERPL-MCNC: 57 MG/DL (ref 8–23)
BUN SERPL-MCNC: 59 MG/DL (ref 8–23)
CA-I BLDV-SCNC: 1.1 MMOL/L (ref 1.06–1.42)
CALCIUM SERPL-MCNC: 6.2 MG/DL (ref 8.7–10.5)
CALCIUM SERPL-MCNC: 7.8 MG/DL (ref 8.7–10.5)
CALCIUM SERPL-MCNC: 7.9 MG/DL (ref 8.7–10.5)
CHLORIDE SERPL-SCNC: 86 MMOL/L (ref 95–110)
CHLORIDE SERPL-SCNC: 87 MMOL/L (ref 95–110)
CHLORIDE SERPL-SCNC: 95 MMOL/L (ref 95–110)
CHLORIDE UR-SCNC: 22 MMOL/L (ref 25–200)
CO2 SERPL-SCNC: 19 MMOL/L (ref 23–29)
CO2 SERPL-SCNC: 19 MMOL/L (ref 23–29)
CO2 SERPL-SCNC: 20 MMOL/L (ref 23–29)
CREAT SERPL-MCNC: 3.9 MG/DL (ref 0.5–1.4)
CREAT SERPL-MCNC: 4.3 MG/DL (ref 0.5–1.4)
CREAT SERPL-MCNC: 4.3 MG/DL (ref 0.5–1.4)
DIFFERENTIAL METHOD: ABNORMAL
EOSINOPHIL # BLD AUTO: 0.1 K/UL (ref 0–0.5)
EOSINOPHIL NFR BLD: 1.3 % (ref 0–8)
ERYTHROCYTE [DISTWIDTH] IN BLOOD BY AUTOMATED COUNT: 15.5 % (ref 11.5–14.5)
EST. GFR  (AFRICAN AMERICAN): 11.4 ML/MIN/1.73 M^2
EST. GFR  (AFRICAN AMERICAN): 11.4 ML/MIN/1.73 M^2
EST. GFR  (AFRICAN AMERICAN): 12.8 ML/MIN/1.73 M^2
EST. GFR  (NON AFRICAN AMERICAN): 11.1 ML/MIN/1.73 M^2
EST. GFR  (NON AFRICAN AMERICAN): 9.9 ML/MIN/1.73 M^2
EST. GFR  (NON AFRICAN AMERICAN): 9.9 ML/MIN/1.73 M^2
GLUCOSE SERPL-MCNC: 128 MG/DL (ref 70–110)
GLUCOSE SERPL-MCNC: 129 MG/DL (ref 70–110)
GLUCOSE SERPL-MCNC: 139 MG/DL (ref 70–110)
HCT VFR BLD AUTO: 24.3 % (ref 37–48.5)
HGB BLD-MCNC: 7.7 G/DL (ref 12–16)
IMM GRANULOCYTES # BLD AUTO: 0.2 K/UL (ref 0–0.04)
IMM GRANULOCYTES NFR BLD AUTO: 2.6 % (ref 0–0.5)
LYMPHOCYTES # BLD AUTO: 1.7 K/UL (ref 1–4.8)
LYMPHOCYTES NFR BLD: 22.3 % (ref 18–48)
MCH RBC QN AUTO: 28.4 PG (ref 27–31)
MCHC RBC AUTO-ENTMCNC: 31.7 G/DL (ref 32–36)
MCV RBC AUTO: 90 FL (ref 82–98)
MONOCYTES # BLD AUTO: 1.2 K/UL (ref 0.3–1)
MONOCYTES NFR BLD: 15.1 % (ref 4–15)
NEUTROPHILS # BLD AUTO: 4.4 K/UL (ref 1.8–7.7)
NEUTROPHILS NFR BLD: 58.3 % (ref 38–73)
NRBC BLD-RTO: 0 /100 WBC
OSMOLALITY UR: 176 MOSM/KG (ref 50–1200)
PHOSPHATE SERPL-MCNC: 3.3 MG/DL (ref 2.7–4.5)
PHOSPHATE SERPL-MCNC: 3.9 MG/DL (ref 2.7–4.5)
PHOSPHATE SERPL-MCNC: 4.1 MG/DL (ref 2.7–4.5)
PLATELET # BLD AUTO: 294 K/UL (ref 150–450)
PMV BLD AUTO: 9.4 FL (ref 9.2–12.9)
POCT GLUCOSE: 190 MG/DL (ref 70–110)
POCT GLUCOSE: 202 MG/DL (ref 70–110)
POCT GLUCOSE: 222 MG/DL (ref 70–110)
POTASSIUM SERPL-SCNC: 3 MMOL/L (ref 3.5–5.1)
POTASSIUM SERPL-SCNC: 3.5 MMOL/L (ref 3.5–5.1)
POTASSIUM SERPL-SCNC: 3.7 MMOL/L (ref 3.5–5.1)
RBC # BLD AUTO: 2.71 M/UL (ref 4–5.4)
SODIUM SERPL-SCNC: 119 MMOL/L (ref 136–145)
SODIUM SERPL-SCNC: 120 MMOL/L (ref 136–145)
SODIUM SERPL-SCNC: 125 MMOL/L (ref 136–145)
SODIUM UR-SCNC: 33 MMOL/L (ref 20–250)
WBC # BLD AUTO: 7.61 K/UL (ref 3.9–12.7)

## 2021-12-13 PROCEDURE — 20600001 HC STEP DOWN PRIVATE ROOM

## 2021-12-13 PROCEDURE — 84300 ASSAY OF URINE SODIUM: CPT | Performed by: NURSE PRACTITIONER

## 2021-12-13 PROCEDURE — 97535 SELF CARE MNGMENT TRAINING: CPT

## 2021-12-13 PROCEDURE — 94668 MNPJ CHEST WALL SBSQ: CPT

## 2021-12-13 PROCEDURE — 25000003 PHARM REV CODE 250: Performed by: PHYSICIAN ASSISTANT

## 2021-12-13 PROCEDURE — 85025 COMPLETE CBC W/AUTO DIFF WBC: CPT | Performed by: STUDENT IN AN ORGANIZED HEALTH CARE EDUCATION/TRAINING PROGRAM

## 2021-12-13 PROCEDURE — 92526 ORAL FUNCTION THERAPY: CPT

## 2021-12-13 PROCEDURE — 82436 ASSAY OF URINE CHLORIDE: CPT | Performed by: NURSE PRACTITIONER

## 2021-12-13 PROCEDURE — 94799 UNLISTED PULMONARY SVC/PX: CPT

## 2021-12-13 PROCEDURE — 83935 ASSAY OF URINE OSMOLALITY: CPT | Performed by: NURSE PRACTITIONER

## 2021-12-13 PROCEDURE — 99900035 HC TECH TIME PER 15 MIN (STAT)

## 2021-12-13 PROCEDURE — 25000003 PHARM REV CODE 250: Performed by: STUDENT IN AN ORGANIZED HEALTH CARE EDUCATION/TRAINING PROGRAM

## 2021-12-13 PROCEDURE — 99232 PR SUBSEQUENT HOSPITAL CARE,LEVL II: ICD-10-PCS | Mod: ,,, | Performed by: STUDENT IN AN ORGANIZED HEALTH CARE EDUCATION/TRAINING PROGRAM

## 2021-12-13 PROCEDURE — 94761 N-INVAS EAR/PLS OXIMETRY MLT: CPT

## 2021-12-13 PROCEDURE — 25000242 PHARM REV CODE 250 ALT 637 W/ HCPCS: Performed by: STUDENT IN AN ORGANIZED HEALTH CARE EDUCATION/TRAINING PROGRAM

## 2021-12-13 PROCEDURE — 25000003 PHARM REV CODE 250: Performed by: CLINICAL NURSE SPECIALIST

## 2021-12-13 PROCEDURE — 36415 COLL VENOUS BLD VENIPUNCTURE: CPT | Performed by: STUDENT IN AN ORGANIZED HEALTH CARE EDUCATION/TRAINING PROGRAM

## 2021-12-13 PROCEDURE — 36415 COLL VENOUS BLD VENIPUNCTURE: CPT | Performed by: NURSE PRACTITIONER

## 2021-12-13 PROCEDURE — 99232 SBSQ HOSP IP/OBS MODERATE 35: CPT | Mod: ,,, | Performed by: STUDENT IN AN ORGANIZED HEALTH CARE EDUCATION/TRAINING PROGRAM

## 2021-12-13 PROCEDURE — 25000003 PHARM REV CODE 250: Performed by: INTERNAL MEDICINE

## 2021-12-13 PROCEDURE — 82330 ASSAY OF CALCIUM: CPT | Performed by: NURSE PRACTITIONER

## 2021-12-13 PROCEDURE — 80069 RENAL FUNCTION PANEL: CPT | Mod: 91 | Performed by: STUDENT IN AN ORGANIZED HEALTH CARE EDUCATION/TRAINING PROGRAM

## 2021-12-13 PROCEDURE — 94640 AIRWAY INHALATION TREATMENT: CPT

## 2021-12-13 RX ORDER — ACETAMINOPHEN 500 MG
1000 TABLET ORAL ONCE
Status: COMPLETED | OUTPATIENT
Start: 2021-12-13 | End: 2021-12-13

## 2021-12-13 RX ADMIN — HYDROCODONE BITARTRATE AND ACETAMINOPHEN 1 TABLET: 10; 325 TABLET ORAL at 10:12

## 2021-12-13 RX ADMIN — IPRATROPIUM BROMIDE AND ALBUTEROL SULFATE 3 ML: 2.5; .5 SOLUTION RESPIRATORY (INHALATION) at 12:12

## 2021-12-13 RX ADMIN — ACETAMINOPHEN 1000 MG: 500 TABLET ORAL at 09:12

## 2021-12-13 RX ADMIN — IPRATROPIUM BROMIDE AND ALBUTEROL SULFATE 3 ML: 2.5; .5 SOLUTION RESPIRATORY (INHALATION) at 07:12

## 2021-12-13 RX ADMIN — LIDOCAINE 1 PATCH: 50 PATCH CUTANEOUS at 08:12

## 2021-12-13 RX ADMIN — IPRATROPIUM BROMIDE AND ALBUTEROL SULFATE 3 ML: 2.5; .5 SOLUTION RESPIRATORY (INHALATION) at 04:12

## 2021-12-13 RX ADMIN — ANASTROZOLE 1 MG: 1 TABLET, COATED ORAL at 09:12

## 2021-12-13 RX ADMIN — INSULIN ASPART 1 UNITS: 100 INJECTION, SOLUTION INTRAVENOUS; SUBCUTANEOUS at 12:12

## 2021-12-13 RX ADMIN — IPRATROPIUM BROMIDE AND ALBUTEROL SULFATE 3 ML: 2.5; .5 SOLUTION RESPIRATORY (INHALATION) at 08:12

## 2021-12-13 RX ADMIN — ATORVASTATIN CALCIUM 80 MG: 20 TABLET, FILM COATED ORAL at 09:12

## 2021-12-13 RX ADMIN — FAMOTIDINE 20 MG: 20 TABLET ORAL at 09:12

## 2021-12-13 RX ADMIN — INSULIN ASPART 4 UNITS: 100 INJECTION, SOLUTION INTRAVENOUS; SUBCUTANEOUS at 11:12

## 2021-12-13 RX ADMIN — HYDROCODONE BITARTRATE AND ACETAMINOPHEN 1 TABLET: 10; 325 TABLET ORAL at 11:12

## 2021-12-13 RX ADMIN — ASPIRIN 81 MG CHEWABLE TABLET 81 MG: 81 TABLET CHEWABLE at 09:12

## 2021-12-13 RX ADMIN — APIXABAN 2.5 MG: 2.5 TABLET, FILM COATED ORAL at 09:12

## 2021-12-13 RX ADMIN — LEVOTHYROXINE SODIUM 50 MCG: 50 TABLET ORAL at 06:12

## 2021-12-13 RX ADMIN — HYDROCODONE BITARTRATE AND ACETAMINOPHEN 1 TABLET: 10; 325 TABLET ORAL at 04:12

## 2021-12-13 RX ADMIN — APIXABAN 2.5 MG: 2.5 TABLET, FILM COATED ORAL at 08:12

## 2021-12-14 LAB
ALBUMIN SERPL BCP-MCNC: 1.7 G/DL (ref 3.5–5.2)
ALBUMIN SERPL BCP-MCNC: 1.7 G/DL (ref 3.5–5.2)
ALBUMIN SERPL BCP-MCNC: 1.8 G/DL (ref 3.5–5.2)
ANION GAP SERPL CALC-SCNC: 12 MMOL/L (ref 8–16)
ANION GAP SERPL CALC-SCNC: 13 MMOL/L (ref 8–16)
ANION GAP SERPL CALC-SCNC: 17 MMOL/L (ref 8–16)
BUN SERPL-MCNC: 62 MG/DL (ref 8–23)
BUN SERPL-MCNC: 63 MG/DL (ref 8–23)
BUN SERPL-MCNC: 65 MG/DL (ref 8–23)
CALCIUM SERPL-MCNC: 7.9 MG/DL (ref 8.7–10.5)
CALCIUM SERPL-MCNC: 8 MG/DL (ref 8.7–10.5)
CALCIUM SERPL-MCNC: 8.1 MG/DL (ref 8.7–10.5)
CHLORIDE SERPL-SCNC: 84 MMOL/L (ref 95–110)
CHLORIDE SERPL-SCNC: 85 MMOL/L (ref 95–110)
CHLORIDE SERPL-SCNC: 86 MMOL/L (ref 95–110)
CO2 SERPL-SCNC: 16 MMOL/L (ref 23–29)
CO2 SERPL-SCNC: 21 MMOL/L (ref 23–29)
CO2 SERPL-SCNC: 21 MMOL/L (ref 23–29)
CREAT SERPL-MCNC: 4.2 MG/DL (ref 0.5–1.4)
CREAT SERPL-MCNC: 4.3 MG/DL (ref 0.5–1.4)
CREAT SERPL-MCNC: 4.8 MG/DL (ref 0.5–1.4)
EST. GFR  (AFRICAN AMERICAN): 10 ML/MIN/1.73 M^2
EST. GFR  (AFRICAN AMERICAN): 11.4 ML/MIN/1.73 M^2
EST. GFR  (AFRICAN AMERICAN): 11.7 ML/MIN/1.73 M^2
EST. GFR  (NON AFRICAN AMERICAN): 10.2 ML/MIN/1.73 M^2
EST. GFR  (NON AFRICAN AMERICAN): 8.6 ML/MIN/1.73 M^2
EST. GFR  (NON AFRICAN AMERICAN): 9.9 ML/MIN/1.73 M^2
GLUCOSE SERPL-MCNC: 120 MG/DL (ref 70–110)
GLUCOSE SERPL-MCNC: 134 MG/DL (ref 70–110)
GLUCOSE SERPL-MCNC: 165 MG/DL (ref 70–110)
PHOSPHATE SERPL-MCNC: 4.5 MG/DL (ref 2.7–4.5)
PHOSPHATE SERPL-MCNC: 4.5 MG/DL (ref 2.7–4.5)
PHOSPHATE SERPL-MCNC: 4.8 MG/DL (ref 2.7–4.5)
POCT GLUCOSE: 139 MG/DL (ref 70–110)
POCT GLUCOSE: 140 MG/DL (ref 70–110)
POCT GLUCOSE: 158 MG/DL (ref 70–110)
POCT GLUCOSE: 162 MG/DL (ref 70–110)
POCT GLUCOSE: 185 MG/DL (ref 70–110)
POCT GLUCOSE: 194 MG/DL (ref 70–110)
POTASSIUM SERPL-SCNC: 3.6 MMOL/L (ref 3.5–5.1)
POTASSIUM SERPL-SCNC: 3.7 MMOL/L (ref 3.5–5.1)
POTASSIUM SERPL-SCNC: 4.1 MMOL/L (ref 3.5–5.1)
SODIUM SERPL-SCNC: 118 MMOL/L (ref 136–145)
SODIUM SERPL-SCNC: 118 MMOL/L (ref 136–145)
SODIUM SERPL-SCNC: 119 MMOL/L (ref 136–145)

## 2021-12-14 PROCEDURE — 25000242 PHARM REV CODE 250 ALT 637 W/ HCPCS: Performed by: STUDENT IN AN ORGANIZED HEALTH CARE EDUCATION/TRAINING PROGRAM

## 2021-12-14 PROCEDURE — 94668 MNPJ CHEST WALL SBSQ: CPT

## 2021-12-14 PROCEDURE — 99900035 HC TECH TIME PER 15 MIN (STAT)

## 2021-12-14 PROCEDURE — 99232 SBSQ HOSP IP/OBS MODERATE 35: CPT | Mod: ,,, | Performed by: STUDENT IN AN ORGANIZED HEALTH CARE EDUCATION/TRAINING PROGRAM

## 2021-12-14 PROCEDURE — 97535 SELF CARE MNGMENT TRAINING: CPT

## 2021-12-14 PROCEDURE — 80069 RENAL FUNCTION PANEL: CPT | Mod: 91 | Performed by: STUDENT IN AN ORGANIZED HEALTH CARE EDUCATION/TRAINING PROGRAM

## 2021-12-14 PROCEDURE — 99232 PR SUBSEQUENT HOSPITAL CARE,LEVL II: ICD-10-PCS | Mod: ,,, | Performed by: STUDENT IN AN ORGANIZED HEALTH CARE EDUCATION/TRAINING PROGRAM

## 2021-12-14 PROCEDURE — 94761 N-INVAS EAR/PLS OXIMETRY MLT: CPT

## 2021-12-14 PROCEDURE — 94640 AIRWAY INHALATION TREATMENT: CPT

## 2021-12-14 PROCEDURE — 25000003 PHARM REV CODE 250: Performed by: STUDENT IN AN ORGANIZED HEALTH CARE EDUCATION/TRAINING PROGRAM

## 2021-12-14 PROCEDURE — 80069 RENAL FUNCTION PANEL: CPT | Mod: 91 | Performed by: NURSE PRACTITIONER

## 2021-12-14 PROCEDURE — 25000003 PHARM REV CODE 250: Performed by: INTERNAL MEDICINE

## 2021-12-14 PROCEDURE — 27000646 HC AEROBIKA DEVICE

## 2021-12-14 PROCEDURE — 94799 UNLISTED PULMONARY SVC/PX: CPT

## 2021-12-14 PROCEDURE — 94760 N-INVAS EAR/PLS OXIMETRY 1: CPT

## 2021-12-14 PROCEDURE — 99232 PR SUBSEQUENT HOSPITAL CARE,LEVL II: ICD-10-PCS | Mod: ,,, | Performed by: INTERNAL MEDICINE

## 2021-12-14 PROCEDURE — 97530 THERAPEUTIC ACTIVITIES: CPT

## 2021-12-14 PROCEDURE — 99232 SBSQ HOSP IP/OBS MODERATE 35: CPT | Mod: ,,, | Performed by: INTERNAL MEDICINE

## 2021-12-14 PROCEDURE — 36415 COLL VENOUS BLD VENIPUNCTURE: CPT | Performed by: STUDENT IN AN ORGANIZED HEALTH CARE EDUCATION/TRAINING PROGRAM

## 2021-12-14 PROCEDURE — 25000003 PHARM REV CODE 250: Performed by: CLINICAL NURSE SPECIALIST

## 2021-12-14 PROCEDURE — 20600001 HC STEP DOWN PRIVATE ROOM

## 2021-12-14 PROCEDURE — 36415 COLL VENOUS BLD VENIPUNCTURE: CPT | Performed by: NURSE PRACTITIONER

## 2021-12-14 PROCEDURE — 63600175 PHARM REV CODE 636 W HCPCS: Performed by: NURSE PRACTITIONER

## 2021-12-14 PROCEDURE — 94664 DEMO&/EVAL PT USE INHALER: CPT

## 2021-12-14 RX ORDER — FUROSEMIDE 10 MG/ML
100 INJECTION INTRAMUSCULAR; INTRAVENOUS ONCE
Status: COMPLETED | OUTPATIENT
Start: 2021-12-14 | End: 2021-12-14

## 2021-12-14 RX ADMIN — HYDROCODONE BITARTRATE AND ACETAMINOPHEN 1 TABLET: 10; 325 TABLET ORAL at 11:12

## 2021-12-14 RX ADMIN — FAMOTIDINE 20 MG: 20 TABLET ORAL at 08:12

## 2021-12-14 RX ADMIN — FUROSEMIDE 100 MG: 10 INJECTION, SOLUTION INTRAVENOUS at 11:12

## 2021-12-14 RX ADMIN — IPRATROPIUM BROMIDE AND ALBUTEROL SULFATE 3 ML: 2.5; .5 SOLUTION RESPIRATORY (INHALATION) at 12:12

## 2021-12-14 RX ADMIN — HYDROCODONE BITARTRATE AND ACETAMINOPHEN 1 TABLET: 10; 325 TABLET ORAL at 05:12

## 2021-12-14 RX ADMIN — LIDOCAINE 1 PATCH: 50 PATCH CUTANEOUS at 08:12

## 2021-12-14 RX ADMIN — IPRATROPIUM BROMIDE AND ALBUTEROL SULFATE 3 ML: 2.5; .5 SOLUTION RESPIRATORY (INHALATION) at 03:12

## 2021-12-14 RX ADMIN — IPRATROPIUM BROMIDE AND ALBUTEROL SULFATE 3 ML: 2.5; .5 SOLUTION RESPIRATORY (INHALATION) at 07:12

## 2021-12-14 RX ADMIN — ATORVASTATIN CALCIUM 80 MG: 20 TABLET, FILM COATED ORAL at 08:12

## 2021-12-14 RX ADMIN — ASPIRIN 81 MG CHEWABLE TABLET 81 MG: 81 TABLET CHEWABLE at 08:12

## 2021-12-14 RX ADMIN — APIXABAN 2.5 MG: 2.5 TABLET, FILM COATED ORAL at 08:12

## 2021-12-14 RX ADMIN — LEVOTHYROXINE SODIUM 50 MCG: 50 TABLET ORAL at 05:12

## 2021-12-14 RX ADMIN — IPRATROPIUM BROMIDE AND ALBUTEROL SULFATE 3 ML: 2.5; .5 SOLUTION RESPIRATORY (INHALATION) at 08:12

## 2021-12-14 RX ADMIN — ANASTROZOLE 1 MG: 1 TABLET, COATED ORAL at 08:12

## 2021-12-14 RX ADMIN — HYDROCODONE BITARTRATE AND ACETAMINOPHEN 1 TABLET: 10; 325 TABLET ORAL at 06:12

## 2021-12-14 RX ADMIN — IPRATROPIUM BROMIDE AND ALBUTEROL SULFATE 3 ML: 2.5; .5 SOLUTION RESPIRATORY (INHALATION) at 04:12

## 2021-12-14 RX ADMIN — IPRATROPIUM BROMIDE AND ALBUTEROL SULFATE 3 ML: 2.5; .5 SOLUTION RESPIRATORY (INHALATION) at 11:12

## 2021-12-15 ENCOUNTER — TELEPHONE (OUTPATIENT)
Dept: NEPHROLOGY | Facility: CLINIC | Age: 71
End: 2021-12-15
Payer: MEDICAID

## 2021-12-15 PROBLEM — R04.0 EPISTAXIS: Status: RESOLVED | Noted: 2021-11-29 | Resolved: 2021-12-15

## 2021-12-15 PROBLEM — G93.40 ACUTE ENCEPHALOPATHY: Status: RESOLVED | Noted: 2021-11-29 | Resolved: 2021-12-15

## 2021-12-15 PROBLEM — R94.31 ST SEGMENT ELEVATION: Status: RESOLVED | Noted: 2021-11-28 | Resolved: 2021-12-15

## 2021-12-15 LAB
ALBUMIN SERPL BCP-MCNC: 1.7 G/DL (ref 3.5–5.2)
ALLENS TEST: ABNORMAL
ANION GAP SERPL CALC-SCNC: 15 MMOL/L (ref 8–16)
BUN SERPL-MCNC: 63 MG/DL (ref 8–23)
CALCIUM SERPL-MCNC: 7.9 MG/DL (ref 8.7–10.5)
CHLORIDE SERPL-SCNC: 83 MMOL/L (ref 95–110)
CO2 SERPL-SCNC: 18 MMOL/L (ref 23–29)
CREAT SERPL-MCNC: 4.6 MG/DL (ref 0.5–1.4)
DELSYS: ABNORMAL
EST. GFR  (AFRICAN AMERICAN): 10.5 ML/MIN/1.73 M^2
EST. GFR  (NON AFRICAN AMERICAN): 9.1 ML/MIN/1.73 M^2
GLUCOSE SERPL-MCNC: 131 MG/DL (ref 70–110)
PHOSPHATE SERPL-MCNC: 4.8 MG/DL (ref 2.7–4.5)
POC TEMPERATURE: ABNORMAL
POCT GLUCOSE: 156 MG/DL (ref 70–110)
POCT GLUCOSE: 184 MG/DL (ref 70–110)
POCT GLUCOSE: 225 MG/DL (ref 70–110)
POTASSIUM SERPL-SCNC: 3.6 MMOL/L (ref 3.5–5.1)
PROVIDER NOTIFIED: ABNORMAL
SAMPLE: ABNORMAL
SITE: ABNORMAL
SODIUM BLD-SCNC: 116 MMOL/L (ref 136–145)
SODIUM SERPL-SCNC: 116 MMOL/L (ref 136–145)
SODIUM SERPL-SCNC: 116 MMOL/L (ref 136–145)
TIME NOTIFIED: 2022

## 2021-12-15 PROCEDURE — 25000003 PHARM REV CODE 250: Performed by: STUDENT IN AN ORGANIZED HEALTH CARE EDUCATION/TRAINING PROGRAM

## 2021-12-15 PROCEDURE — 84295 ASSAY OF SERUM SODIUM: CPT

## 2021-12-15 PROCEDURE — 90945 DIALYSIS ONE EVALUATION: CPT

## 2021-12-15 PROCEDURE — 99233 SBSQ HOSP IP/OBS HIGH 50: CPT | Mod: ,,, | Performed by: STUDENT IN AN ORGANIZED HEALTH CARE EDUCATION/TRAINING PROGRAM

## 2021-12-15 PROCEDURE — 36415 COLL VENOUS BLD VENIPUNCTURE: CPT | Performed by: NURSE PRACTITIONER

## 2021-12-15 PROCEDURE — 94640 AIRWAY INHALATION TREATMENT: CPT

## 2021-12-15 PROCEDURE — 93010 ELECTROCARDIOGRAM REPORT: CPT | Mod: ,,, | Performed by: INTERNAL MEDICINE

## 2021-12-15 PROCEDURE — 99291 PR CRITICAL CARE, E/M 30-74 MINUTES: ICD-10-PCS | Mod: ,,, | Performed by: CLINICAL NURSE SPECIALIST

## 2021-12-15 PROCEDURE — 25000003 PHARM REV CODE 250: Performed by: INTERNAL MEDICINE

## 2021-12-15 PROCEDURE — 25000003 PHARM REV CODE 250: Performed by: NURSE PRACTITIONER

## 2021-12-15 PROCEDURE — 84295 ASSAY OF SERUM SODIUM: CPT | Performed by: NURSE PRACTITIONER

## 2021-12-15 PROCEDURE — 99900035 HC TECH TIME PER 15 MIN (STAT)

## 2021-12-15 PROCEDURE — 27000646 HC AEROBIKA DEVICE

## 2021-12-15 PROCEDURE — 99233 PR SUBSEQUENT HOSPITAL CARE,LEVL III: ICD-10-PCS | Mod: ,,, | Performed by: STUDENT IN AN ORGANIZED HEALTH CARE EDUCATION/TRAINING PROGRAM

## 2021-12-15 PROCEDURE — 99233 PR SUBSEQUENT HOSPITAL CARE,LEVL III: ICD-10-PCS | Mod: ,,, | Performed by: INTERNAL MEDICINE

## 2021-12-15 PROCEDURE — 93005 ELECTROCARDIOGRAM TRACING: CPT

## 2021-12-15 PROCEDURE — 63600175 PHARM REV CODE 636 W HCPCS: Performed by: NURSE PRACTITIONER

## 2021-12-15 PROCEDURE — 80069 RENAL FUNCTION PANEL: CPT | Performed by: STUDENT IN AN ORGANIZED HEALTH CARE EDUCATION/TRAINING PROGRAM

## 2021-12-15 PROCEDURE — 94760 N-INVAS EAR/PLS OXIMETRY 1: CPT

## 2021-12-15 PROCEDURE — 94664 DEMO&/EVAL PT USE INHALER: CPT

## 2021-12-15 PROCEDURE — 99233 SBSQ HOSP IP/OBS HIGH 50: CPT | Mod: ,,, | Performed by: INTERNAL MEDICINE

## 2021-12-15 PROCEDURE — 93010 EKG 12-LEAD: ICD-10-PCS | Mod: ,,, | Performed by: INTERNAL MEDICINE

## 2021-12-15 PROCEDURE — 36415 COLL VENOUS BLD VENIPUNCTURE: CPT | Performed by: STUDENT IN AN ORGANIZED HEALTH CARE EDUCATION/TRAINING PROGRAM

## 2021-12-15 PROCEDURE — 25000242 PHARM REV CODE 250 ALT 637 W/ HCPCS: Performed by: STUDENT IN AN ORGANIZED HEALTH CARE EDUCATION/TRAINING PROGRAM

## 2021-12-15 PROCEDURE — 25000003 PHARM REV CODE 250: Performed by: CLINICAL NURSE SPECIALIST

## 2021-12-15 PROCEDURE — 27202415 HC CARTRIDGE, CRRT

## 2021-12-15 PROCEDURE — 20000000 HC ICU ROOM

## 2021-12-15 PROCEDURE — 99291 CRITICAL CARE FIRST HOUR: CPT | Mod: ,,, | Performed by: CLINICAL NURSE SPECIALIST

## 2021-12-15 PROCEDURE — 94761 N-INVAS EAR/PLS OXIMETRY MLT: CPT

## 2021-12-15 RX ORDER — HYDROCODONE BITARTRATE AND ACETAMINOPHEN 500; 5 MG/1; MG/1
TABLET ORAL CONTINUOUS
Status: ACTIVE | OUTPATIENT
Start: 2021-12-15 | End: 2021-12-16

## 2021-12-15 RX ORDER — MAGNESIUM SULFATE HEPTAHYDRATE 40 MG/ML
2 INJECTION, SOLUTION INTRAVENOUS
Status: DISPENSED | OUTPATIENT
Start: 2021-12-15 | End: 2021-12-16

## 2021-12-15 RX ORDER — DEXTROSE MONOHYDRATE 50 MG/ML
INJECTION, SOLUTION INTRAVENOUS CONTINUOUS
Status: DISCONTINUED | OUTPATIENT
Start: 2021-12-15 | End: 2021-12-17

## 2021-12-15 RX ADMIN — ANASTROZOLE 1 MG: 1 TABLET, COATED ORAL at 09:12

## 2021-12-15 RX ADMIN — IPRATROPIUM BROMIDE AND ALBUTEROL SULFATE 3 ML: 2.5; .5 SOLUTION RESPIRATORY (INHALATION) at 03:12

## 2021-12-15 RX ADMIN — IPRATROPIUM BROMIDE AND ALBUTEROL SULFATE 3 ML: 2.5; .5 SOLUTION RESPIRATORY (INHALATION) at 04:12

## 2021-12-15 RX ADMIN — HYDROCODONE BITARTRATE AND ACETAMINOPHEN 1 TABLET: 10; 325 TABLET ORAL at 03:12

## 2021-12-15 RX ADMIN — IPRATROPIUM BROMIDE AND ALBUTEROL SULFATE 3 ML: 2.5; .5 SOLUTION RESPIRATORY (INHALATION) at 07:12

## 2021-12-15 RX ADMIN — IPRATROPIUM BROMIDE AND ALBUTEROL SULFATE 3 ML: 2.5; .5 SOLUTION RESPIRATORY (INHALATION) at 12:12

## 2021-12-15 RX ADMIN — IPRATROPIUM BROMIDE AND ALBUTEROL SULFATE 3 ML: 2.5; .5 SOLUTION RESPIRATORY (INHALATION) at 08:12

## 2021-12-15 RX ADMIN — INSULIN ASPART 2 UNITS: 100 INJECTION, SOLUTION INTRAVENOUS; SUBCUTANEOUS at 09:12

## 2021-12-15 RX ADMIN — FUROSEMIDE 160 MG: 10 INJECTION, SOLUTION INTRAMUSCULAR; INTRAVENOUS at 10:12

## 2021-12-15 RX ADMIN — ASPIRIN 81 MG CHEWABLE TABLET 81 MG: 81 TABLET CHEWABLE at 11:12

## 2021-12-15 RX ADMIN — LIDOCAINE 1 PATCH: 50 PATCH CUTANEOUS at 08:12

## 2021-12-15 RX ADMIN — DEXTROSE MONOHYDRATE: 5 INJECTION, SOLUTION INTRAVENOUS at 05:12

## 2021-12-15 RX ADMIN — HYDROCODONE BITARTRATE AND ACETAMINOPHEN 1 TABLET: 10; 325 TABLET ORAL at 02:12

## 2021-12-15 RX ADMIN — APIXABAN 2.5 MG: 2.5 TABLET, FILM COATED ORAL at 11:12

## 2021-12-15 RX ADMIN — ATORVASTATIN CALCIUM 80 MG: 20 TABLET, FILM COATED ORAL at 11:12

## 2021-12-15 RX ADMIN — LEVOTHYROXINE SODIUM 50 MCG: 50 TABLET ORAL at 05:12

## 2021-12-15 RX ADMIN — IPRATROPIUM BROMIDE AND ALBUTEROL SULFATE 3 ML: 2.5; .5 SOLUTION RESPIRATORY (INHALATION) at 11:12

## 2021-12-15 RX ADMIN — FAMOTIDINE 20 MG: 20 TABLET ORAL at 11:12

## 2021-12-15 RX ADMIN — HYDROCODONE BITARTRATE AND ACETAMINOPHEN 1 TABLET: 10; 325 TABLET ORAL at 08:12

## 2021-12-15 RX ADMIN — APIXABAN 2.5 MG: 2.5 TABLET, FILM COATED ORAL at 09:12

## 2021-12-16 PROBLEM — D72.829 LEUKOCYTOSIS: Status: RESOLVED | Noted: 2021-12-06 | Resolved: 2021-12-16

## 2021-12-16 PROBLEM — N39.0 UTI (URINARY TRACT INFECTION): Status: RESOLVED | Noted: 2021-09-29 | Resolved: 2021-12-16

## 2021-12-16 LAB
ABO + RH BLD: NORMAL
ALBUMIN SERPL BCP-MCNC: 1.6 G/DL (ref 3.5–5.2)
ALBUMIN SERPL BCP-MCNC: 1.7 G/DL (ref 3.5–5.2)
ALLENS TEST: ABNORMAL
ANION GAP SERPL CALC-SCNC: 10 MMOL/L (ref 8–16)
ANION GAP SERPL CALC-SCNC: 10 MMOL/L (ref 8–16)
ANION GAP SERPL CALC-SCNC: 13 MMOL/L (ref 8–16)
ANION GAP SERPL CALC-SCNC: 13 MMOL/L (ref 8–16)
BASOPHILS # BLD AUTO: 0.03 K/UL (ref 0–0.2)
BASOPHILS # BLD AUTO: 0.03 K/UL (ref 0–0.2)
BASOPHILS NFR BLD: 0.2 % (ref 0–1.9)
BASOPHILS NFR BLD: 0.4 % (ref 0–1.9)
BLD GP AB SCN CELLS X3 SERPL QL: NORMAL
BLD PROD TYP BPU: NORMAL
BLOOD UNIT EXPIRATION DATE: NORMAL
BLOOD UNIT TYPE CODE: 6200
BLOOD UNIT TYPE: NORMAL
BUN SERPL-MCNC: 20 MG/DL (ref 8–23)
BUN SERPL-MCNC: 26 MG/DL (ref 8–23)
BUN SERPL-MCNC: 31 MG/DL (ref 8–23)
BUN SERPL-MCNC: 46 MG/DL (ref 8–23)
CALCIUM SERPL-MCNC: 7 MG/DL (ref 8.7–10.5)
CALCIUM SERPL-MCNC: 7.3 MG/DL (ref 8.7–10.5)
CALCIUM SERPL-MCNC: 7.3 MG/DL (ref 8.7–10.5)
CALCIUM SERPL-MCNC: 7.7 MG/DL (ref 8.7–10.5)
CHLORIDE SERPL-SCNC: 89 MMOL/L (ref 95–110)
CHLORIDE SERPL-SCNC: 90 MMOL/L (ref 95–110)
CO2 SERPL-SCNC: 16 MMOL/L (ref 23–29)
CO2 SERPL-SCNC: 18 MMOL/L (ref 23–29)
CO2 SERPL-SCNC: 20 MMOL/L (ref 23–29)
CO2 SERPL-SCNC: 20 MMOL/L (ref 23–29)
CODING SYSTEM: NORMAL
CREAT SERPL-MCNC: 1.5 MG/DL (ref 0.5–1.4)
CREAT SERPL-MCNC: 1.8 MG/DL (ref 0.5–1.4)
CREAT SERPL-MCNC: 2.2 MG/DL (ref 0.5–1.4)
CREAT SERPL-MCNC: 3.3 MG/DL (ref 0.5–1.4)
DELSYS: ABNORMAL
DIFFERENTIAL METHOD: ABNORMAL
DIFFERENTIAL METHOD: ABNORMAL
DISPENSE STATUS: NORMAL
EOSINOPHIL # BLD AUTO: 0 K/UL (ref 0–0.5)
EOSINOPHIL # BLD AUTO: 0.1 K/UL (ref 0–0.5)
EOSINOPHIL NFR BLD: 0.3 % (ref 0–8)
EOSINOPHIL NFR BLD: 1.7 % (ref 0–8)
ERYTHROCYTE [DISTWIDTH] IN BLOOD BY AUTOMATED COUNT: 15.3 % (ref 11.5–14.5)
ERYTHROCYTE [DISTWIDTH] IN BLOOD BY AUTOMATED COUNT: 15.6 % (ref 11.5–14.5)
EST. GFR  (AFRICAN AMERICAN): 15.7 ML/MIN/1.73 M^2
EST. GFR  (AFRICAN AMERICAN): 25.6 ML/MIN/1.73 M^2
EST. GFR  (AFRICAN AMERICAN): 32.6 ML/MIN/1.73 M^2
EST. GFR  (AFRICAN AMERICAN): 40.7 ML/MIN/1.73 M^2
EST. GFR  (NON AFRICAN AMERICAN): 13.6 ML/MIN/1.73 M^2
EST. GFR  (NON AFRICAN AMERICAN): 22.2 ML/MIN/1.73 M^2
EST. GFR  (NON AFRICAN AMERICAN): 28.3 ML/MIN/1.73 M^2
EST. GFR  (NON AFRICAN AMERICAN): 35.3 ML/MIN/1.73 M^2
GLUCOSE SERPL-MCNC: 174 MG/DL (ref 70–110)
GLUCOSE SERPL-MCNC: 212 MG/DL (ref 70–110)
GLUCOSE SERPL-MCNC: 218 MG/DL (ref 70–110)
GLUCOSE SERPL-MCNC: 240 MG/DL (ref 70–110)
HCO3 UR-SCNC: 22.7 MMOL/L (ref 24–28)
HCT VFR BLD AUTO: 20.5 % (ref 37–48.5)
HCT VFR BLD AUTO: 23 % (ref 37–48.5)
HCT VFR BLD CALC: 18 %PCV (ref 36–54)
HGB BLD-MCNC: 6.6 G/DL (ref 12–16)
HGB BLD-MCNC: 7.8 G/DL (ref 12–16)
IMM GRANULOCYTES # BLD AUTO: 0.16 K/UL (ref 0–0.04)
IMM GRANULOCYTES # BLD AUTO: 0.29 K/UL (ref 0–0.04)
IMM GRANULOCYTES NFR BLD AUTO: 2 % (ref 0–0.5)
IMM GRANULOCYTES NFR BLD AUTO: 2.2 % (ref 0–0.5)
LYMPHOCYTES # BLD AUTO: 0.7 K/UL (ref 1–4.8)
LYMPHOCYTES # BLD AUTO: 1.3 K/UL (ref 1–4.8)
LYMPHOCYTES NFR BLD: 16.6 % (ref 18–48)
LYMPHOCYTES NFR BLD: 5.3 % (ref 18–48)
MAGNESIUM SERPL-MCNC: 1.4 MG/DL (ref 1.6–2.6)
MAGNESIUM SERPL-MCNC: 1.8 MG/DL (ref 1.6–2.6)
MAGNESIUM SERPL-MCNC: 1.9 MG/DL (ref 1.6–2.6)
MAGNESIUM SERPL-MCNC: 2 MG/DL (ref 1.6–2.6)
MCH RBC QN AUTO: 28.6 PG (ref 27–31)
MCH RBC QN AUTO: 29.3 PG (ref 27–31)
MCHC RBC AUTO-ENTMCNC: 32.2 G/DL (ref 32–36)
MCHC RBC AUTO-ENTMCNC: 33.9 G/DL (ref 32–36)
MCV RBC AUTO: 87 FL (ref 82–98)
MCV RBC AUTO: 89 FL (ref 82–98)
MONOCYTES # BLD AUTO: 0.3 K/UL (ref 0.3–1)
MONOCYTES # BLD AUTO: 0.9 K/UL (ref 0.3–1)
MONOCYTES NFR BLD: 11.4 % (ref 4–15)
MONOCYTES NFR BLD: 2.4 % (ref 4–15)
NEUTROPHILS # BLD AUTO: 11.9 K/UL (ref 1.8–7.7)
NEUTROPHILS # BLD AUTO: 5.3 K/UL (ref 1.8–7.7)
NEUTROPHILS NFR BLD: 67.9 % (ref 38–73)
NEUTROPHILS NFR BLD: 89.6 % (ref 38–73)
NRBC BLD-RTO: 0 /100 WBC
NRBC BLD-RTO: 0 /100 WBC
PCO2 BLDA: 40.9 MMHG (ref 35–45)
PH SMN: 7.35 [PH] (ref 7.35–7.45)
PHOSPHATE SERPL-MCNC: 3 MG/DL (ref 2.7–4.5)
PHOSPHATE SERPL-MCNC: 3.4 MG/DL (ref 2.7–4.5)
PHOSPHATE SERPL-MCNC: 3.5 MG/DL (ref 2.7–4.5)
PHOSPHATE SERPL-MCNC: 4.2 MG/DL (ref 2.7–4.5)
PLATELET # BLD AUTO: 340 K/UL (ref 150–450)
PLATELET # BLD AUTO: 354 K/UL (ref 150–450)
PMV BLD AUTO: 8.9 FL (ref 9.2–12.9)
PMV BLD AUTO: 9.1 FL (ref 9.2–12.9)
PO2 BLDA: 30 MMHG (ref 40–60)
POC BE: -3 MMOL/L
POC IONIZED CALCIUM: 1.12 MMOL/L (ref 1.06–1.42)
POC PCO2 TEMP: 40.6 MMHG
POC PH TEMP: 7.36
POC PO2 TEMP: 30 MMHG
POC SATURATED O2: 55 % (ref 95–100)
POC TCO2: 24 MMOL/L (ref 24–29)
POC TEMPERATURE: ABNORMAL
POC TEMPERATURE: ABNORMAL
POCT GLUCOSE: 236 MG/DL (ref 70–110)
POCT GLUCOSE: 246 MG/DL (ref 70–110)
POCT GLUCOSE: 252 MG/DL (ref 70–110)
POCT GLUCOSE: 254 MG/DL (ref 70–110)
POCT GLUCOSE: 283 MG/DL (ref 70–110)
POCT GLUCOSE: >500 MG/DL (ref 70–110)
POTASSIUM BLD-SCNC: 3.3 MMOL/L (ref 3.5–5.1)
POTASSIUM SERPL-SCNC: 3.4 MMOL/L (ref 3.5–5.1)
POTASSIUM SERPL-SCNC: 3.5 MMOL/L (ref 3.5–5.1)
PROVIDER NOTIFIED: ABNORMAL
PROVIDER NOTIFIED: ABNORMAL
RBC # BLD AUTO: 2.31 M/UL (ref 4–5.4)
RBC # BLD AUTO: 2.66 M/UL (ref 4–5.4)
SAMPLE: ABNORMAL
SITE: ABNORMAL
SODIUM BLD-SCNC: 119 MMOL/L (ref 136–145)
SODIUM BLD-SCNC: 120 MMOL/L (ref 136–145)
SODIUM BLD-SCNC: 121 MMOL/L (ref 136–145)
SODIUM SERPL-SCNC: 118 MMOL/L (ref 136–145)
SODIUM SERPL-SCNC: 119 MMOL/L (ref 136–145)
SODIUM SERPL-SCNC: 121 MMOL/L (ref 136–145)
SODIUM SERPL-SCNC: 122 MMOL/L (ref 136–145)
TIME NOTIFIED: 341
TIME NOTIFIED: 5
TRANS ERYTHROCYTES VOL PATIENT: NORMAL ML
WBC # BLD AUTO: 13.24 K/UL (ref 3.9–12.7)
WBC # BLD AUTO: 7.81 K/UL (ref 3.9–12.7)

## 2021-12-16 PROCEDURE — 20000000 HC ICU ROOM

## 2021-12-16 PROCEDURE — 86900 BLOOD TYPING SEROLOGIC ABO: CPT | Performed by: STUDENT IN AN ORGANIZED HEALTH CARE EDUCATION/TRAINING PROGRAM

## 2021-12-16 PROCEDURE — 25000003 PHARM REV CODE 250: Performed by: STUDENT IN AN ORGANIZED HEALTH CARE EDUCATION/TRAINING PROGRAM

## 2021-12-16 PROCEDURE — 27000646 HC AEROBIKA DEVICE

## 2021-12-16 PROCEDURE — 25000003 PHARM REV CODE 250: Performed by: INTERNAL MEDICINE

## 2021-12-16 PROCEDURE — 94799 UNLISTED PULMONARY SVC/PX: CPT

## 2021-12-16 PROCEDURE — 99499 UNLISTED E&M SERVICE: CPT | Mod: ,,, | Performed by: NURSE PRACTITIONER

## 2021-12-16 PROCEDURE — 94664 DEMO&/EVAL PT USE INHALER: CPT

## 2021-12-16 PROCEDURE — 94761 N-INVAS EAR/PLS OXIMETRY MLT: CPT

## 2021-12-16 PROCEDURE — 25000003 PHARM REV CODE 250: Performed by: CLINICAL NURSE SPECIALIST

## 2021-12-16 PROCEDURE — 94640 AIRWAY INHALATION TREATMENT: CPT

## 2021-12-16 PROCEDURE — 99233 SBSQ HOSP IP/OBS HIGH 50: CPT | Mod: ,,, | Performed by: INTERNAL MEDICINE

## 2021-12-16 PROCEDURE — 83735 ASSAY OF MAGNESIUM: CPT | Mod: 91 | Performed by: STUDENT IN AN ORGANIZED HEALTH CARE EDUCATION/TRAINING PROGRAM

## 2021-12-16 PROCEDURE — 63600175 PHARM REV CODE 636 W HCPCS: Performed by: NURSE PRACTITIONER

## 2021-12-16 PROCEDURE — 99900035 HC TECH TIME PER 15 MIN (STAT)

## 2021-12-16 PROCEDURE — 83735 ASSAY OF MAGNESIUM: CPT | Mod: 91 | Performed by: EMERGENCY MEDICINE

## 2021-12-16 PROCEDURE — 99233 PR SUBSEQUENT HOSPITAL CARE,LEVL III: ICD-10-PCS | Mod: ,,, | Performed by: INTERNAL MEDICINE

## 2021-12-16 PROCEDURE — 86920 COMPATIBILITY TEST SPIN: CPT | Performed by: STUDENT IN AN ORGANIZED HEALTH CARE EDUCATION/TRAINING PROGRAM

## 2021-12-16 PROCEDURE — P9021 RED BLOOD CELLS UNIT: HCPCS | Performed by: STUDENT IN AN ORGANIZED HEALTH CARE EDUCATION/TRAINING PROGRAM

## 2021-12-16 PROCEDURE — 99499 NO LOS: ICD-10-PCS | Mod: ,,, | Performed by: NURSE PRACTITIONER

## 2021-12-16 PROCEDURE — 99291 PR CRITICAL CARE, E/M 30-74 MINUTES: ICD-10-PCS | Mod: ,,, | Performed by: NURSE PRACTITIONER

## 2021-12-16 PROCEDURE — 80069 RENAL FUNCTION PANEL: CPT | Mod: 91 | Performed by: STUDENT IN AN ORGANIZED HEALTH CARE EDUCATION/TRAINING PROGRAM

## 2021-12-16 PROCEDURE — 80100008 HC CRRT DAILY MAINTENANCE

## 2021-12-16 PROCEDURE — 80069 RENAL FUNCTION PANEL: CPT | Performed by: STUDENT IN AN ORGANIZED HEALTH CARE EDUCATION/TRAINING PROGRAM

## 2021-12-16 PROCEDURE — 99291 CRITICAL CARE FIRST HOUR: CPT | Mod: ,,, | Performed by: NURSE PRACTITIONER

## 2021-12-16 PROCEDURE — 82803 BLOOD GASES ANY COMBINATION: CPT

## 2021-12-16 PROCEDURE — 84295 ASSAY OF SERUM SODIUM: CPT

## 2021-12-16 PROCEDURE — 80069 RENAL FUNCTION PANEL: CPT | Mod: 91 | Performed by: EMERGENCY MEDICINE

## 2021-12-16 PROCEDURE — 25000242 PHARM REV CODE 250 ALT 637 W/ HCPCS: Performed by: STUDENT IN AN ORGANIZED HEALTH CARE EDUCATION/TRAINING PROGRAM

## 2021-12-16 PROCEDURE — 94668 MNPJ CHEST WALL SBSQ: CPT

## 2021-12-16 PROCEDURE — 85025 COMPLETE CBC W/AUTO DIFF WBC: CPT | Mod: 91 | Performed by: NURSE PRACTITIONER

## 2021-12-16 PROCEDURE — 90945 DIALYSIS ONE EVALUATION: CPT

## 2021-12-16 PROCEDURE — 25000003 PHARM REV CODE 250: Performed by: NURSE PRACTITIONER

## 2021-12-16 PROCEDURE — 83735 ASSAY OF MAGNESIUM: CPT | Performed by: STUDENT IN AN ORGANIZED HEALTH CARE EDUCATION/TRAINING PROGRAM

## 2021-12-16 PROCEDURE — 84295 ASSAY OF SERUM SODIUM: CPT | Performed by: NURSE PRACTITIONER

## 2021-12-16 PROCEDURE — 27202415 HC CARTRIDGE, CRRT

## 2021-12-16 RX ORDER — HYDROCODONE BITARTRATE AND ACETAMINOPHEN 500; 5 MG/1; MG/1
TABLET ORAL
Status: DISCONTINUED | OUTPATIENT
Start: 2021-12-16 | End: 2021-12-17

## 2021-12-16 RX ORDER — POTASSIUM CHLORIDE 20 MEQ/1
40 TABLET, EXTENDED RELEASE ORAL ONCE
Status: COMPLETED | OUTPATIENT
Start: 2021-12-16 | End: 2021-12-16

## 2021-12-16 RX ADMIN — IPRATROPIUM BROMIDE AND ALBUTEROL SULFATE 3 ML: 2.5; .5 SOLUTION RESPIRATORY (INHALATION) at 07:12

## 2021-12-16 RX ADMIN — IPRATROPIUM BROMIDE AND ALBUTEROL SULFATE 3 ML: 2.5; .5 SOLUTION RESPIRATORY (INHALATION) at 12:12

## 2021-12-16 RX ADMIN — HYDROCODONE BITARTRATE AND ACETAMINOPHEN 1 TABLET: 10; 325 TABLET ORAL at 02:12

## 2021-12-16 RX ADMIN — DEXTROSE MONOHYDRATE: 5 INJECTION, SOLUTION INTRAVENOUS at 05:12

## 2021-12-16 RX ADMIN — DEXTROSE MONOHYDRATE: 5 INJECTION, SOLUTION INTRAVENOUS at 10:12

## 2021-12-16 RX ADMIN — HYDROCODONE BITARTRATE AND ACETAMINOPHEN 1 TABLET: 10; 325 TABLET ORAL at 09:12

## 2021-12-16 RX ADMIN — INSULIN ASPART 4 UNITS: 100 INJECTION, SOLUTION INTRAVENOUS; SUBCUTANEOUS at 06:12

## 2021-12-16 RX ADMIN — LIDOCAINE 1 PATCH: 50 PATCH CUTANEOUS at 09:12

## 2021-12-16 RX ADMIN — INSULIN ASPART 6 UNITS: 100 INJECTION, SOLUTION INTRAVENOUS; SUBCUTANEOUS at 12:12

## 2021-12-16 RX ADMIN — ATORVASTATIN CALCIUM 80 MG: 20 TABLET, FILM COATED ORAL at 08:12

## 2021-12-16 RX ADMIN — INSULIN ASPART 3 UNITS: 100 INJECTION, SOLUTION INTRAVENOUS; SUBCUTANEOUS at 10:12

## 2021-12-16 RX ADMIN — IPRATROPIUM BROMIDE AND ALBUTEROL SULFATE 3 ML: 2.5; .5 SOLUTION RESPIRATORY (INHALATION) at 03:12

## 2021-12-16 RX ADMIN — DEXTROSE MONOHYDRATE: 5 INJECTION, SOLUTION INTRAVENOUS at 03:12

## 2021-12-16 RX ADMIN — INSULIN ASPART 6 UNITS: 100 INJECTION, SOLUTION INTRAVENOUS; SUBCUTANEOUS at 05:12

## 2021-12-16 RX ADMIN — FAMOTIDINE 20 MG: 20 TABLET ORAL at 08:12

## 2021-12-16 RX ADMIN — LEVOTHYROXINE SODIUM 50 MCG: 50 TABLET ORAL at 06:12

## 2021-12-16 RX ADMIN — ANASTROZOLE 1 MG: 1 TABLET, COATED ORAL at 08:12

## 2021-12-16 RX ADMIN — APIXABAN 2.5 MG: 2.5 TABLET, FILM COATED ORAL at 09:12

## 2021-12-16 RX ADMIN — DEXTROSE MONOHYDRATE: 5 INJECTION, SOLUTION INTRAVENOUS at 08:12

## 2021-12-16 RX ADMIN — IPRATROPIUM BROMIDE AND ALBUTEROL SULFATE 3 ML: 2.5; .5 SOLUTION RESPIRATORY (INHALATION) at 11:12

## 2021-12-16 RX ADMIN — SUMATRIPTAN SUCCINATE 100 MG: 50 TABLET ORAL at 06:12

## 2021-12-16 RX ADMIN — ASPIRIN 81 MG CHEWABLE TABLET 81 MG: 81 TABLET CHEWABLE at 08:12

## 2021-12-16 RX ADMIN — HYDROCODONE BITARTRATE AND ACETAMINOPHEN 1 TABLET: 10; 325 TABLET ORAL at 07:12

## 2021-12-16 RX ADMIN — INSULIN ASPART 4 UNITS: 100 INJECTION, SOLUTION INTRAVENOUS; SUBCUTANEOUS at 08:12

## 2021-12-16 RX ADMIN — MAGNESIUM SULFATE IN WATER 2 G: 40 INJECTION, SOLUTION INTRAVENOUS at 06:12

## 2021-12-16 RX ADMIN — POTASSIUM CHLORIDE 40 MEQ: 1500 TABLET, EXTENDED RELEASE ORAL at 04:12

## 2021-12-16 RX ADMIN — APIXABAN 2.5 MG: 2.5 TABLET, FILM COATED ORAL at 08:12

## 2021-12-17 LAB
ALBUMIN SERPL BCP-MCNC: 1.6 G/DL (ref 3.5–5.2)
ALBUMIN SERPL BCP-MCNC: 1.7 G/DL (ref 3.5–5.2)
ALBUMIN SERPL BCP-MCNC: 1.7 G/DL (ref 3.5–5.2)
ALLENS TEST: ABNORMAL
ANION GAP SERPL CALC-SCNC: 11 MMOL/L (ref 8–16)
ANION GAP SERPL CALC-SCNC: 8 MMOL/L (ref 8–16)
ANION GAP SERPL CALC-SCNC: 9 MMOL/L (ref 8–16)
BASOPHILS # BLD AUTO: 0.04 K/UL (ref 0–0.2)
BASOPHILS NFR BLD: 0.5 % (ref 0–1.9)
BUN SERPL-MCNC: 10 MG/DL (ref 8–23)
BUN SERPL-MCNC: 13 MG/DL (ref 8–23)
BUN SERPL-MCNC: 16 MG/DL (ref 8–23)
CALCIUM SERPL-MCNC: 6.8 MG/DL (ref 8.7–10.5)
CALCIUM SERPL-MCNC: 7.1 MG/DL (ref 8.7–10.5)
CALCIUM SERPL-MCNC: 7.3 MG/DL (ref 8.7–10.5)
CHLORIDE SERPL-SCNC: 90 MMOL/L (ref 95–110)
CHLORIDE SERPL-SCNC: 91 MMOL/L (ref 95–110)
CHLORIDE SERPL-SCNC: 91 MMOL/L (ref 95–110)
CO2 SERPL-SCNC: 18 MMOL/L (ref 23–29)
CO2 SERPL-SCNC: 19 MMOL/L (ref 23–29)
CO2 SERPL-SCNC: 19 MMOL/L (ref 23–29)
CREAT SERPL-MCNC: 0.9 MG/DL (ref 0.5–1.4)
CREAT SERPL-MCNC: 1.1 MG/DL (ref 0.5–1.4)
CREAT SERPL-MCNC: 1.2 MG/DL (ref 0.5–1.4)
DELSYS: ABNORMAL
DIFFERENTIAL METHOD: ABNORMAL
EOSINOPHIL # BLD AUTO: 0.2 K/UL (ref 0–0.5)
EOSINOPHIL NFR BLD: 2 % (ref 0–8)
ERYTHROCYTE [DISTWIDTH] IN BLOOD BY AUTOMATED COUNT: 15.2 % (ref 11.5–14.5)
EST. GFR  (AFRICAN AMERICAN): 53.3 ML/MIN/1.73 M^2
EST. GFR  (AFRICAN AMERICAN): 59.2 ML/MIN/1.73 M^2
EST. GFR  (AFRICAN AMERICAN): >60 ML/MIN/1.73 M^2
EST. GFR  (NON AFRICAN AMERICAN): 46.2 ML/MIN/1.73 M^2
EST. GFR  (NON AFRICAN AMERICAN): 51.3 ML/MIN/1.73 M^2
EST. GFR  (NON AFRICAN AMERICAN): >60 ML/MIN/1.73 M^2
GLUCOSE SERPL-MCNC: 203 MG/DL (ref 70–110)
GLUCOSE SERPL-MCNC: 212 MG/DL (ref 70–110)
GLUCOSE SERPL-MCNC: 233 MG/DL (ref 70–110)
HCO3 UR-SCNC: 21.2 MMOL/L (ref 24–28)
HCO3 UR-SCNC: 22.1 MMOL/L (ref 24–28)
HCO3 UR-SCNC: 22.4 MMOL/L (ref 24–28)
HCT VFR BLD AUTO: 22.5 % (ref 37–48.5)
HCT VFR BLD CALC: 19 %PCV (ref 36–54)
HCT VFR BLD CALC: 21 %PCV (ref 36–54)
HCT VFR BLD CALC: 24 %PCV (ref 36–54)
HGB BLD-MCNC: 7.3 G/DL (ref 12–16)
IMM GRANULOCYTES # BLD AUTO: 0.12 K/UL (ref 0–0.04)
IMM GRANULOCYTES NFR BLD AUTO: 1.5 % (ref 0–0.5)
LYMPHOCYTES # BLD AUTO: 1.8 K/UL (ref 1–4.8)
LYMPHOCYTES NFR BLD: 22.1 % (ref 18–48)
MAGNESIUM SERPL-MCNC: 1.8 MG/DL (ref 1.6–2.6)
MAGNESIUM SERPL-MCNC: 1.8 MG/DL (ref 1.6–2.6)
MAGNESIUM SERPL-MCNC: 1.9 MG/DL (ref 1.6–2.6)
MCH RBC QN AUTO: 29 PG (ref 27–31)
MCHC RBC AUTO-ENTMCNC: 32.4 G/DL (ref 32–36)
MCV RBC AUTO: 89 FL (ref 82–98)
MONOCYTES # BLD AUTO: 1.1 K/UL (ref 0.3–1)
MONOCYTES NFR BLD: 13.9 % (ref 4–15)
NEUTROPHILS # BLD AUTO: 4.9 K/UL (ref 1.8–7.7)
NEUTROPHILS NFR BLD: 60 % (ref 38–73)
NRBC BLD-RTO: 0 /100 WBC
PCO2 BLDA: 38.2 MMHG (ref 35–45)
PCO2 BLDA: 39.4 MMHG (ref 35–45)
PCO2 BLDA: 39.5 MMHG (ref 35–45)
PH SMN: 7.35 [PH] (ref 7.35–7.45)
PH SMN: 7.36 [PH] (ref 7.35–7.45)
PH SMN: 7.36 [PH] (ref 7.35–7.45)
PHOSPHATE SERPL-MCNC: 3 MG/DL (ref 2.7–4.5)
PHOSPHATE SERPL-MCNC: 3 MG/DL (ref 2.7–4.5)
PHOSPHATE SERPL-MCNC: 3.1 MG/DL (ref 2.7–4.5)
PLATELET # BLD AUTO: 256 K/UL (ref 150–450)
PMV BLD AUTO: 8.8 FL (ref 9.2–12.9)
PO2 BLDA: 28 MMHG (ref 40–60)
PO2 BLDA: 28 MMHG (ref 40–60)
PO2 BLDA: 29 MMHG (ref 40–60)
POC BE: -3 MMOL/L
POC BE: -3 MMOL/L
POC BE: -4 MMOL/L
POC IONIZED CALCIUM: 1.13 MMOL/L (ref 1.06–1.42)
POC IONIZED CALCIUM: 1.14 MMOL/L (ref 1.06–1.42)
POC IONIZED CALCIUM: 1.15 MMOL/L (ref 1.06–1.42)
POC SATURATED O2: 50 % (ref 95–100)
POC SATURATED O2: 51 % (ref 95–100)
POC SATURATED O2: 52 % (ref 95–100)
POC TCO2: 22 MMOL/L (ref 24–29)
POC TCO2: 23 MMOL/L (ref 24–29)
POC TCO2: 24 MMOL/L (ref 24–29)
POCT GLUCOSE: 194 MG/DL (ref 70–110)
POCT GLUCOSE: 213 MG/DL (ref 70–110)
POCT GLUCOSE: 232 MG/DL (ref 70–110)
POTASSIUM BLD-SCNC: 4 MMOL/L (ref 3.5–5.1)
POTASSIUM BLD-SCNC: 4.1 MMOL/L (ref 3.5–5.1)
POTASSIUM BLD-SCNC: 4.1 MMOL/L (ref 3.5–5.1)
POTASSIUM SERPL-SCNC: 3.8 MMOL/L (ref 3.5–5.1)
POTASSIUM SERPL-SCNC: 3.9 MMOL/L (ref 3.5–5.1)
POTASSIUM SERPL-SCNC: 3.9 MMOL/L (ref 3.5–5.1)
RBC # BLD AUTO: 2.52 M/UL (ref 4–5.4)
SAMPLE: ABNORMAL
SITE: ABNORMAL
SODIUM BLD-SCNC: 121 MMOL/L (ref 136–145)
SODIUM BLD-SCNC: 122 MMOL/L (ref 136–145)
SODIUM SERPL-SCNC: 117 MMOL/L (ref 136–145)
SODIUM SERPL-SCNC: 119 MMOL/L (ref 136–145)
SODIUM SERPL-SCNC: 119 MMOL/L (ref 136–145)
SODIUM SERPL-SCNC: 120 MMOL/L (ref 136–145)
WBC # BLD AUTO: 8.07 K/UL (ref 3.9–12.7)

## 2021-12-17 PROCEDURE — 84295 ASSAY OF SERUM SODIUM: CPT | Performed by: INTERNAL MEDICINE

## 2021-12-17 PROCEDURE — 25000242 PHARM REV CODE 250 ALT 637 W/ HCPCS: Performed by: EMERGENCY MEDICINE

## 2021-12-17 PROCEDURE — 80069 RENAL FUNCTION PANEL: CPT | Mod: 91 | Performed by: STUDENT IN AN ORGANIZED HEALTH CARE EDUCATION/TRAINING PROGRAM

## 2021-12-17 PROCEDURE — 94664 DEMO&/EVAL PT USE INHALER: CPT

## 2021-12-17 PROCEDURE — 27202415 HC CARTRIDGE, CRRT

## 2021-12-17 PROCEDURE — 20000000 HC ICU ROOM

## 2021-12-17 PROCEDURE — 84132 ASSAY OF SERUM POTASSIUM: CPT

## 2021-12-17 PROCEDURE — 27201236 HC CRRT SET UP & CANCELED

## 2021-12-17 PROCEDURE — 25000003 PHARM REV CODE 250: Performed by: INTERNAL MEDICINE

## 2021-12-17 PROCEDURE — 85025 COMPLETE CBC W/AUTO DIFF WBC: CPT | Performed by: NURSE PRACTITIONER

## 2021-12-17 PROCEDURE — 25000003 PHARM REV CODE 250: Performed by: STUDENT IN AN ORGANIZED HEALTH CARE EDUCATION/TRAINING PROGRAM

## 2021-12-17 PROCEDURE — 63600175 PHARM REV CODE 636 W HCPCS: Performed by: STUDENT IN AN ORGANIZED HEALTH CARE EDUCATION/TRAINING PROGRAM

## 2021-12-17 PROCEDURE — 94761 N-INVAS EAR/PLS OXIMETRY MLT: CPT

## 2021-12-17 PROCEDURE — 99499 NO LOS: ICD-10-PCS | Mod: GC,,, | Performed by: EMERGENCY MEDICINE

## 2021-12-17 PROCEDURE — 99291 PR CRITICAL CARE, E/M 30-74 MINUTES: ICD-10-PCS | Mod: ,,, | Performed by: CLINICAL NURSE SPECIALIST

## 2021-12-17 PROCEDURE — 27000646 HC AEROBIKA DEVICE

## 2021-12-17 PROCEDURE — 94799 UNLISTED PULMONARY SVC/PX: CPT

## 2021-12-17 PROCEDURE — 99233 PR SUBSEQUENT HOSPITAL CARE,LEVL III: ICD-10-PCS | Mod: ,,, | Performed by: INTERNAL MEDICINE

## 2021-12-17 PROCEDURE — 83735 ASSAY OF MAGNESIUM: CPT | Performed by: STUDENT IN AN ORGANIZED HEALTH CARE EDUCATION/TRAINING PROGRAM

## 2021-12-17 PROCEDURE — 90945 DIALYSIS ONE EVALUATION: CPT

## 2021-12-17 PROCEDURE — 80100008 HC CRRT DAILY MAINTENANCE

## 2021-12-17 PROCEDURE — 99499 UNLISTED E&M SERVICE: CPT | Mod: GC,,, | Performed by: EMERGENCY MEDICINE

## 2021-12-17 PROCEDURE — 99291 CRITICAL CARE FIRST HOUR: CPT | Mod: ,,, | Performed by: CLINICAL NURSE SPECIALIST

## 2021-12-17 PROCEDURE — 99233 SBSQ HOSP IP/OBS HIGH 50: CPT | Mod: ,,, | Performed by: INTERNAL MEDICINE

## 2021-12-17 PROCEDURE — 85014 HEMATOCRIT: CPT

## 2021-12-17 PROCEDURE — 82330 ASSAY OF CALCIUM: CPT

## 2021-12-17 PROCEDURE — 99900035 HC TECH TIME PER 15 MIN (STAT)

## 2021-12-17 PROCEDURE — 83735 ASSAY OF MAGNESIUM: CPT | Mod: 91 | Performed by: STUDENT IN AN ORGANIZED HEALTH CARE EDUCATION/TRAINING PROGRAM

## 2021-12-17 PROCEDURE — 25000003 PHARM REV CODE 250: Performed by: CLINICAL NURSE SPECIALIST

## 2021-12-17 PROCEDURE — 84295 ASSAY OF SERUM SODIUM: CPT

## 2021-12-17 PROCEDURE — 82803 BLOOD GASES ANY COMBINATION: CPT

## 2021-12-17 PROCEDURE — 25000003 PHARM REV CODE 250: Performed by: PHYSICIAN ASSISTANT

## 2021-12-17 PROCEDURE — 94640 AIRWAY INHALATION TREATMENT: CPT

## 2021-12-17 PROCEDURE — 25000242 PHARM REV CODE 250 ALT 637 W/ HCPCS: Performed by: STUDENT IN AN ORGANIZED HEALTH CARE EDUCATION/TRAINING PROGRAM

## 2021-12-17 RX ORDER — IPRATROPIUM BROMIDE AND ALBUTEROL SULFATE 2.5; .5 MG/3ML; MG/3ML
3 SOLUTION RESPIRATORY (INHALATION)
Status: DISCONTINUED | OUTPATIENT
Start: 2021-12-17 | End: 2021-12-19

## 2021-12-17 RX ORDER — 3% SODIUM CHLORIDE 3 G/100ML
60 INJECTION, SOLUTION INTRAVENOUS CONTINUOUS
Status: DISCONTINUED | OUTPATIENT
Start: 2021-12-17 | End: 2021-12-19

## 2021-12-17 RX ORDER — FENTANYL CITRATE/PF 250MCG/5ML
25 SYRINGE (ML) INTRAVENOUS
Status: DISCONTINUED | OUTPATIENT
Start: 2021-12-17 | End: 2021-12-21

## 2021-12-17 RX ORDER — OXYCODONE HYDROCHLORIDE 5 MG/1
5 TABLET ORAL ONCE
Status: COMPLETED | OUTPATIENT
Start: 2021-12-17 | End: 2021-12-17

## 2021-12-17 RX ADMIN — DEXTROSE MONOHYDRATE: 5 INJECTION, SOLUTION INTRAVENOUS at 06:12

## 2021-12-17 RX ADMIN — ANASTROZOLE 1 MG: 1 TABLET, COATED ORAL at 07:12

## 2021-12-17 RX ADMIN — INSULIN ASPART 2 UNITS: 100 INJECTION, SOLUTION INTRAVENOUS; SUBCUTANEOUS at 04:12

## 2021-12-17 RX ADMIN — HYDROCODONE BITARTRATE AND ACETAMINOPHEN 1 TABLET: 10; 325 TABLET ORAL at 05:12

## 2021-12-17 RX ADMIN — DEXTROSE MONOHYDRATE: 5 INJECTION, SOLUTION INTRAVENOUS at 04:12

## 2021-12-17 RX ADMIN — DEXTROSE MONOHYDRATE: 5 INJECTION, SOLUTION INTRAVENOUS at 11:12

## 2021-12-17 RX ADMIN — DEXTROSE MONOHYDRATE: 5 INJECTION, SOLUTION INTRAVENOUS at 01:12

## 2021-12-17 RX ADMIN — APIXABAN 2.5 MG: 2.5 TABLET, FILM COATED ORAL at 07:12

## 2021-12-17 RX ADMIN — FAMOTIDINE 20 MG: 20 TABLET ORAL at 07:12

## 2021-12-17 RX ADMIN — LEVOTHYROXINE SODIUM 50 MCG: 50 TABLET ORAL at 07:12

## 2021-12-17 RX ADMIN — INSULIN ASPART 2 UNITS: 100 INJECTION, SOLUTION INTRAVENOUS; SUBCUTANEOUS at 09:12

## 2021-12-17 RX ADMIN — HYDROCODONE BITARTRATE AND ACETAMINOPHEN 1 TABLET: 10; 325 TABLET ORAL at 11:12

## 2021-12-17 RX ADMIN — INSULIN ASPART 2 UNITS: 100 INJECTION, SOLUTION INTRAVENOUS; SUBCUTANEOUS at 05:12

## 2021-12-17 RX ADMIN — SODIUM CHLORIDE 50 ML/HR: 3 INJECTION, SOLUTION INTRAVENOUS at 03:12

## 2021-12-17 RX ADMIN — INSULIN ASPART 4 UNITS: 100 INJECTION, SOLUTION INTRAVENOUS; SUBCUTANEOUS at 11:12

## 2021-12-17 RX ADMIN — IPRATROPIUM BROMIDE AND ALBUTEROL SULFATE 3 ML: 2.5; .5 SOLUTION RESPIRATORY (INHALATION) at 08:12

## 2021-12-17 RX ADMIN — ATORVASTATIN CALCIUM 80 MG: 20 TABLET, FILM COATED ORAL at 07:12

## 2021-12-17 RX ADMIN — IPRATROPIUM BROMIDE AND ALBUTEROL SULFATE 3 ML: .5; 3 SOLUTION RESPIRATORY (INHALATION) at 07:12

## 2021-12-17 RX ADMIN — OXYCODONE 5 MG: 5 TABLET ORAL at 11:12

## 2021-12-17 RX ADMIN — Medication 6 MG: at 01:12

## 2021-12-17 RX ADMIN — APIXABAN 2.5 MG: 2.5 TABLET, FILM COATED ORAL at 09:12

## 2021-12-17 RX ADMIN — HYDROCODONE BITARTRATE AND ACETAMINOPHEN 1 TABLET: 10; 325 TABLET ORAL at 07:12

## 2021-12-17 RX ADMIN — ASPIRIN 81 MG CHEWABLE TABLET 81 MG: 81 TABLET CHEWABLE at 07:12

## 2021-12-18 LAB
ALBUMIN SERPL BCP-MCNC: 1.6 G/DL (ref 3.5–5.2)
ALBUMIN SERPL BCP-MCNC: 1.6 G/DL (ref 3.5–5.2)
ALLENS TEST: ABNORMAL
ALP SERPL-CCNC: 126 U/L (ref 55–135)
ALT SERPL W/O P-5'-P-CCNC: 19 U/L (ref 10–44)
ANION GAP SERPL CALC-SCNC: 10 MMOL/L (ref 8–16)
ANION GAP SERPL CALC-SCNC: 5 MMOL/L (ref 8–16)
AST SERPL-CCNC: 15 U/L (ref 10–40)
BILIRUB SERPL-MCNC: 0.4 MG/DL (ref 0.1–1)
BUN SERPL-MCNC: 14 MG/DL (ref 8–23)
BUN SERPL-MCNC: 16 MG/DL (ref 8–23)
CALCIUM SERPL-MCNC: 7.1 MG/DL (ref 8.7–10.5)
CALCIUM SERPL-MCNC: 7.3 MG/DL (ref 8.7–10.5)
CHLORIDE SERPL-SCNC: 94 MMOL/L (ref 95–110)
CHLORIDE SERPL-SCNC: 97 MMOL/L (ref 95–110)
CO2 SERPL-SCNC: 17 MMOL/L (ref 23–29)
CO2 SERPL-SCNC: 22 MMOL/L (ref 23–29)
CREAT SERPL-MCNC: 1.4 MG/DL (ref 0.5–1.4)
CREAT SERPL-MCNC: 1.6 MG/DL (ref 0.5–1.4)
DELSYS: ABNORMAL
EST. GFR  (AFRICAN AMERICAN): 37.6 ML/MIN/1.73 M^2
EST. GFR  (AFRICAN AMERICAN): 44.2 ML/MIN/1.73 M^2
EST. GFR  (NON AFRICAN AMERICAN): 32.6 ML/MIN/1.73 M^2
EST. GFR  (NON AFRICAN AMERICAN): 38.4 ML/MIN/1.73 M^2
GLUCOSE SERPL-MCNC: 167 MG/DL (ref 70–110)
GLUCOSE SERPL-MCNC: 96 MG/DL (ref 70–110)
HCO3 UR-SCNC: 20.2 MMOL/L (ref 24–28)
HCO3 UR-SCNC: 20.6 MMOL/L (ref 24–28)
HCO3 UR-SCNC: 20.6 MMOL/L (ref 24–28)
HCO3 UR-SCNC: 21.2 MMOL/L (ref 24–28)
HCO3 UR-SCNC: 21.4 MMOL/L (ref 24–28)
HCO3 UR-SCNC: 21.7 MMOL/L (ref 24–28)
HCO3 UR-SCNC: 21.7 MMOL/L (ref 24–28)
HCO3 UR-SCNC: 22.1 MMOL/L (ref 24–28)
HCT VFR BLD CALC: 17 %PCV (ref 36–54)
HCT VFR BLD CALC: 18 %PCV (ref 36–54)
HCT VFR BLD CALC: 19 %PCV (ref 36–54)
HCT VFR BLD CALC: 20 %PCV (ref 36–54)
PCO2 BLDA: 37.6 MMHG (ref 35–45)
PCO2 BLDA: 38.2 MMHG (ref 35–45)
PCO2 BLDA: 38.7 MMHG (ref 35–45)
PCO2 BLDA: 38.9 MMHG (ref 35–45)
PCO2 BLDA: 39.3 MMHG (ref 35–45)
PCO2 BLDA: 39.3 MMHG (ref 35–45)
PCO2 BLDA: 39.5 MMHG (ref 35–45)
PCO2 BLDA: 40.5 MMHG (ref 35–45)
PCO2 BLDA: 41.5 MMHG (ref 35–45)
PCO2 BLDA: 41.9 MMHG (ref 35–45)
PH SMN: 7.3 [PH] (ref 7.35–7.45)
PH SMN: 7.32 [PH] (ref 7.35–7.45)
PH SMN: 7.32 [PH] (ref 7.35–7.45)
PH SMN: 7.34 [PH] (ref 7.35–7.45)
PH SMN: 7.35 [PH] (ref 7.35–7.45)
PH SMN: 7.36 [PH] (ref 7.35–7.45)
PH SMN: 7.36 [PH] (ref 7.35–7.45)
PHOSPHATE SERPL-MCNC: 4 MG/DL (ref 2.7–4.5)
PO2 BLDA: 27 MMHG (ref 40–60)
PO2 BLDA: 27 MMHG (ref 40–60)
PO2 BLDA: 29 MMHG (ref 40–60)
PO2 BLDA: 29 MMHG (ref 40–60)
PO2 BLDA: 30 MMHG (ref 40–60)
PO2 BLDA: 30 MMHG (ref 40–60)
PO2 BLDA: 31 MMHG (ref 40–60)
PO2 BLDA: 31 MMHG (ref 40–60)
PO2 BLDA: 32 MMHG (ref 40–60)
PO2 BLDA: 35 MMHG (ref 40–60)
POC BE: -3 MMOL/L
POC BE: -4 MMOL/L
POC BE: -5 MMOL/L
POC BE: -5 MMOL/L
POC BE: -6 MMOL/L
POC BE: -6 MMOL/L
POC IONIZED CALCIUM: 1.14 MMOL/L (ref 1.06–1.42)
POC IONIZED CALCIUM: 1.17 MMOL/L (ref 1.06–1.42)
POC IONIZED CALCIUM: 1.18 MMOL/L (ref 1.06–1.42)
POC IONIZED CALCIUM: 1.19 MMOL/L (ref 1.06–1.42)
POC IONIZED CALCIUM: 1.2 MMOL/L (ref 1.06–1.42)
POC SATURATED O2: 45 % (ref 95–100)
POC SATURATED O2: 48 % (ref 95–100)
POC SATURATED O2: 49 % (ref 95–100)
POC SATURATED O2: 53 % (ref 95–100)
POC SATURATED O2: 53 % (ref 95–100)
POC SATURATED O2: 54 % (ref 95–100)
POC SATURATED O2: 55 % (ref 95–100)
POC SATURATED O2: 57 % (ref 95–100)
POC SATURATED O2: 57 % (ref 95–100)
POC SATURATED O2: 64 % (ref 95–100)
POC TCO2: 21 MMOL/L (ref 24–29)
POC TCO2: 22 MMOL/L (ref 24–29)
POC TCO2: 23 MMOL/L (ref 24–29)
POCT GLUCOSE: 115 MG/DL (ref 70–110)
POCT GLUCOSE: 121 MG/DL (ref 70–110)
POCT GLUCOSE: 139 MG/DL (ref 70–110)
POCT GLUCOSE: 164 MG/DL (ref 70–110)
POCT GLUCOSE: 164 MG/DL (ref 70–110)
POCT GLUCOSE: 175 MG/DL (ref 70–110)
POTASSIUM BLD-SCNC: 4 MMOL/L (ref 3.5–5.1)
POTASSIUM BLD-SCNC: 4.1 MMOL/L (ref 3.5–5.1)
POTASSIUM BLD-SCNC: 4.3 MMOL/L (ref 3.5–5.1)
POTASSIUM BLD-SCNC: 4.4 MMOL/L (ref 3.5–5.1)
POTASSIUM BLD-SCNC: 4.4 MMOL/L (ref 3.5–5.1)
POTASSIUM SERPL-SCNC: 4.1 MMOL/L (ref 3.5–5.1)
POTASSIUM SERPL-SCNC: 4.3 MMOL/L (ref 3.5–5.1)
PROT SERPL-MCNC: 4 G/DL (ref 6–8.4)
SAMPLE: ABNORMAL
SITE: ABNORMAL
SODIUM BLD-SCNC: 123 MMOL/L (ref 136–145)
SODIUM BLD-SCNC: 124 MMOL/L (ref 136–145)
SODIUM BLD-SCNC: 124 MMOL/L (ref 136–145)
SODIUM BLD-SCNC: 126 MMOL/L (ref 136–145)
SODIUM BLD-SCNC: 127 MMOL/L (ref 136–145)
SODIUM BLD-SCNC: 128 MMOL/L (ref 136–145)
SODIUM BLD-SCNC: 129 MMOL/L (ref 136–145)
SODIUM SERPL-SCNC: 121 MMOL/L (ref 136–145)
SODIUM SERPL-SCNC: 124 MMOL/L (ref 136–145)

## 2021-12-18 PROCEDURE — 94761 N-INVAS EAR/PLS OXIMETRY MLT: CPT

## 2021-12-18 PROCEDURE — 82565 ASSAY OF CREATININE: CPT

## 2021-12-18 PROCEDURE — 85014 HEMATOCRIT: CPT

## 2021-12-18 PROCEDURE — 84244 ASSAY OF RENIN: CPT | Performed by: STUDENT IN AN ORGANIZED HEALTH CARE EDUCATION/TRAINING PROGRAM

## 2021-12-18 PROCEDURE — 82800 BLOOD PH: CPT

## 2021-12-18 PROCEDURE — 84132 ASSAY OF SERUM POTASSIUM: CPT

## 2021-12-18 PROCEDURE — 82803 BLOOD GASES ANY COMBINATION: CPT

## 2021-12-18 PROCEDURE — 25000003 PHARM REV CODE 250: Performed by: NURSE PRACTITIONER

## 2021-12-18 PROCEDURE — 99233 PR SUBSEQUENT HOSPITAL CARE,LEVL III: ICD-10-PCS | Mod: ,,, | Performed by: INTERNAL MEDICINE

## 2021-12-18 PROCEDURE — 80069 RENAL FUNCTION PANEL: CPT | Performed by: STUDENT IN AN ORGANIZED HEALTH CARE EDUCATION/TRAINING PROGRAM

## 2021-12-18 PROCEDURE — 94640 AIRWAY INHALATION TREATMENT: CPT

## 2021-12-18 PROCEDURE — 25000003 PHARM REV CODE 250: Performed by: CLINICAL NURSE SPECIALIST

## 2021-12-18 PROCEDURE — 83605 ASSAY OF LACTIC ACID: CPT

## 2021-12-18 PROCEDURE — 80053 COMPREHEN METABOLIC PANEL: CPT | Performed by: CLINICAL NURSE SPECIALIST

## 2021-12-18 PROCEDURE — 84295 ASSAY OF SERUM SODIUM: CPT

## 2021-12-18 PROCEDURE — 99291 PR CRITICAL CARE, E/M 30-74 MINUTES: ICD-10-PCS | Mod: ,,, | Performed by: NURSE PRACTITIONER

## 2021-12-18 PROCEDURE — 99900035 HC TECH TIME PER 15 MIN (STAT)

## 2021-12-18 PROCEDURE — 20000000 HC ICU ROOM

## 2021-12-18 PROCEDURE — 99291 CRITICAL CARE FIRST HOUR: CPT | Mod: ,,, | Performed by: NURSE PRACTITIONER

## 2021-12-18 PROCEDURE — 85610 PROTHROMBIN TIME: CPT

## 2021-12-18 PROCEDURE — 80100014 HC HEMODIALYSIS 1:1

## 2021-12-18 PROCEDURE — 25000003 PHARM REV CODE 250: Performed by: INTERNAL MEDICINE

## 2021-12-18 PROCEDURE — 63600175 PHARM REV CODE 636 W HCPCS: Performed by: NURSE PRACTITIONER

## 2021-12-18 PROCEDURE — 25000003 PHARM REV CODE 250: Performed by: STUDENT IN AN ORGANIZED HEALTH CARE EDUCATION/TRAINING PROGRAM

## 2021-12-18 PROCEDURE — 63600175 PHARM REV CODE 636 W HCPCS: Mod: JG | Performed by: STUDENT IN AN ORGANIZED HEALTH CARE EDUCATION/TRAINING PROGRAM

## 2021-12-18 PROCEDURE — 25000242 PHARM REV CODE 250 ALT 637 W/ HCPCS: Performed by: EMERGENCY MEDICINE

## 2021-12-18 PROCEDURE — 99233 SBSQ HOSP IP/OBS HIGH 50: CPT | Mod: ,,, | Performed by: INTERNAL MEDICINE

## 2021-12-18 PROCEDURE — 82330 ASSAY OF CALCIUM: CPT

## 2021-12-18 PROCEDURE — 63600175 PHARM REV CODE 636 W HCPCS: Performed by: STUDENT IN AN ORGANIZED HEALTH CARE EDUCATION/TRAINING PROGRAM

## 2021-12-18 PROCEDURE — 85347 COAGULATION TIME ACTIVATED: CPT

## 2021-12-18 RX ORDER — SODIUM CHLORIDE 9 MG/ML
INJECTION, SOLUTION INTRAVENOUS ONCE
Status: DISCONTINUED | OUTPATIENT
Start: 2021-12-18 | End: 2021-12-21

## 2021-12-18 RX ORDER — HEPARIN SODIUM 1000 [USP'U]/ML
1000 INJECTION, SOLUTION INTRAVENOUS; SUBCUTANEOUS
Status: DISCONTINUED | OUTPATIENT
Start: 2021-12-18 | End: 2021-12-22

## 2021-12-18 RX ORDER — AMOXICILLIN 250 MG
1 CAPSULE ORAL DAILY PRN
Status: DISCONTINUED | OUTPATIENT
Start: 2021-12-18 | End: 2021-12-29

## 2021-12-18 RX ADMIN — HYDROCODONE BITARTRATE AND ACETAMINOPHEN 1 TABLET: 10; 325 TABLET ORAL at 02:12

## 2021-12-18 RX ADMIN — SENNOSIDES AND DOCUSATE SODIUM 1 TABLET: 50; 8.6 TABLET ORAL at 12:12

## 2021-12-18 RX ADMIN — HYDROCODONE BITARTRATE AND ACETAMINOPHEN 1 TABLET: 10; 325 TABLET ORAL at 08:12

## 2021-12-18 RX ADMIN — IPRATROPIUM BROMIDE AND ALBUTEROL SULFATE 3 ML: .5; 3 SOLUTION RESPIRATORY (INHALATION) at 11:12

## 2021-12-18 RX ADMIN — LEVOTHYROXINE SODIUM 50 MCG: 50 TABLET ORAL at 07:12

## 2021-12-18 RX ADMIN — INSULIN ASPART 2 UNITS: 100 INJECTION, SOLUTION INTRAVENOUS; SUBCUTANEOUS at 04:12

## 2021-12-18 RX ADMIN — IPRATROPIUM BROMIDE AND ALBUTEROL SULFATE 3 ML: .5; 3 SOLUTION RESPIRATORY (INHALATION) at 07:12

## 2021-12-18 RX ADMIN — APIXABAN 2.5 MG: 2.5 TABLET, FILM COATED ORAL at 08:12

## 2021-12-18 RX ADMIN — EPOETIN ALFA-EPBX 10000 UNITS: 10000 INJECTION, SOLUTION INTRAVENOUS; SUBCUTANEOUS at 08:12

## 2021-12-18 RX ADMIN — IPRATROPIUM BROMIDE AND ALBUTEROL SULFATE 3 ML: .5; 3 SOLUTION RESPIRATORY (INHALATION) at 08:12

## 2021-12-18 RX ADMIN — FAMOTIDINE 20 MG: 20 TABLET ORAL at 08:12

## 2021-12-18 RX ADMIN — ATORVASTATIN CALCIUM 80 MG: 20 TABLET, FILM COATED ORAL at 08:12

## 2021-12-18 RX ADMIN — IPRATROPIUM BROMIDE AND ALBUTEROL SULFATE 3 ML: .5; 3 SOLUTION RESPIRATORY (INHALATION) at 04:12

## 2021-12-18 RX ADMIN — ASPIRIN 81 MG CHEWABLE TABLET 81 MG: 81 TABLET CHEWABLE at 08:12

## 2021-12-18 RX ADMIN — INSULIN ASPART 2 UNITS: 100 INJECTION, SOLUTION INTRAVENOUS; SUBCUTANEOUS at 01:12

## 2021-12-18 RX ADMIN — ANASTROZOLE 1 MG: 1 TABLET, COATED ORAL at 08:12

## 2021-12-18 RX ADMIN — SODIUM CHLORIDE 50 ML/HR: 3 INJECTION, SOLUTION INTRAVENOUS at 03:12

## 2021-12-18 RX ADMIN — INSULIN ASPART 1 UNITS: 100 INJECTION, SOLUTION INTRAVENOUS; SUBCUTANEOUS at 08:12

## 2021-12-18 RX ADMIN — SODIUM CHLORIDE 60 ML/HR: 3 INJECTION, SOLUTION INTRAVENOUS at 02:12

## 2021-12-19 LAB
ABO + RH BLD: NORMAL
ALBUMIN SERPL BCP-MCNC: 1.5 G/DL (ref 3.5–5.2)
ALLENS TEST: ABNORMAL
ANION GAP SERPL CALC-SCNC: 6 MMOL/L (ref 8–16)
BASOPHILS # BLD AUTO: 0.03 K/UL (ref 0–0.2)
BASOPHILS # BLD AUTO: 0.03 K/UL (ref 0–0.2)
BASOPHILS NFR BLD: 0.4 % (ref 0–1.9)
BASOPHILS NFR BLD: 0.5 % (ref 0–1.9)
BLD GP AB SCN CELLS X3 SERPL QL: NORMAL
BLD PROD TYP BPU: NORMAL
BLOOD UNIT EXPIRATION DATE: NORMAL
BLOOD UNIT TYPE CODE: 6200
BLOOD UNIT TYPE: NORMAL
BUN SERPL-MCNC: 10 MG/DL (ref 8–23)
CALCIUM SERPL-MCNC: 7.8 MG/DL (ref 8.7–10.5)
CHLORIDE SERPL-SCNC: 100 MMOL/L (ref 95–110)
CO2 SERPL-SCNC: 24 MMOL/L (ref 23–29)
CODING SYSTEM: NORMAL
CORTIS SERPL-MCNC: 11.9 UG/DL (ref 4.3–22.4)
CREAT SERPL-MCNC: 1.1 MG/DL (ref 0.5–1.4)
DELSYS: ABNORMAL
DIFFERENTIAL METHOD: ABNORMAL
DIFFERENTIAL METHOD: ABNORMAL
DISPENSE STATUS: NORMAL
EOSINOPHIL # BLD AUTO: 0.1 K/UL (ref 0–0.5)
EOSINOPHIL # BLD AUTO: 0.1 K/UL (ref 0–0.5)
EOSINOPHIL NFR BLD: 1.2 % (ref 0–8)
EOSINOPHIL NFR BLD: 2.1 % (ref 0–8)
ERYTHROCYTE [DISTWIDTH] IN BLOOD BY AUTOMATED COUNT: 15.5 % (ref 11.5–14.5)
ERYTHROCYTE [DISTWIDTH] IN BLOOD BY AUTOMATED COUNT: 16.2 % (ref 11.5–14.5)
EST. GFR  (AFRICAN AMERICAN): 59.2 ML/MIN/1.73 M^2
EST. GFR  (NON AFRICAN AMERICAN): 51.3 ML/MIN/1.73 M^2
GLUCOSE SERPL-MCNC: 126 MG/DL (ref 70–110)
HCO3 UR-SCNC: 25.4 MMOL/L (ref 24–28)
HCO3 UR-SCNC: 25.5 MMOL/L (ref 24–28)
HCO3 UR-SCNC: 25.5 MMOL/L (ref 24–28)
HCO3 UR-SCNC: 26.2 MMOL/L (ref 24–28)
HCT VFR BLD AUTO: 19.6 % (ref 37–48.5)
HCT VFR BLD AUTO: 24.8 % (ref 37–48.5)
HCT VFR BLD CALC: 18 %PCV (ref 36–54)
HCT VFR BLD CALC: 21 %PCV (ref 36–54)
HCT VFR BLD CALC: 22 %PCV (ref 36–54)
HGB BLD-MCNC: 6.1 G/DL (ref 12–16)
HGB BLD-MCNC: 7.9 G/DL (ref 12–16)
IMM GRANULOCYTES # BLD AUTO: 0.07 K/UL (ref 0–0.04)
IMM GRANULOCYTES # BLD AUTO: 0.1 K/UL (ref 0–0.04)
IMM GRANULOCYTES NFR BLD AUTO: 1.1 % (ref 0–0.5)
IMM GRANULOCYTES NFR BLD AUTO: 1.3 % (ref 0–0.5)
LYMPHOCYTES # BLD AUTO: 0.9 K/UL (ref 1–4.8)
LYMPHOCYTES # BLD AUTO: 1.3 K/UL (ref 1–4.8)
LYMPHOCYTES NFR BLD: 12.3 % (ref 18–48)
LYMPHOCYTES NFR BLD: 19.7 % (ref 18–48)
MCH RBC QN AUTO: 29 PG (ref 27–31)
MCH RBC QN AUTO: 29.3 PG (ref 27–31)
MCHC RBC AUTO-ENTMCNC: 31.1 G/DL (ref 32–36)
MCHC RBC AUTO-ENTMCNC: 31.9 G/DL (ref 32–36)
MCV RBC AUTO: 92 FL (ref 82–98)
MCV RBC AUTO: 93 FL (ref 82–98)
MONOCYTES # BLD AUTO: 0.9 K/UL (ref 0.3–1)
MONOCYTES # BLD AUTO: 1 K/UL (ref 0.3–1)
MONOCYTES NFR BLD: 12.6 % (ref 4–15)
MONOCYTES NFR BLD: 14.2 % (ref 4–15)
NEUTROPHILS # BLD AUTO: 4.2 K/UL (ref 1.8–7.7)
NEUTROPHILS # BLD AUTO: 5.4 K/UL (ref 1.8–7.7)
NEUTROPHILS NFR BLD: 62.4 % (ref 38–73)
NEUTROPHILS NFR BLD: 72.2 % (ref 38–73)
NRBC BLD-RTO: 0 /100 WBC
NRBC BLD-RTO: 0 /100 WBC
PCO2 BLDA: 42.9 MMHG (ref 35–45)
PCO2 BLDA: 43.8 MMHG (ref 35–45)
PCO2 BLDA: 44.5 MMHG (ref 35–45)
PCO2 BLDA: 47.9 MMHG (ref 35–45)
PH SMN: 7.35 [PH] (ref 7.35–7.45)
PH SMN: 7.37 [PH] (ref 7.35–7.45)
PH SMN: 7.37 [PH] (ref 7.35–7.45)
PH SMN: 7.38 [PH] (ref 7.35–7.45)
PHOSPHATE SERPL-MCNC: 2.7 MG/DL (ref 2.7–4.5)
PLATELET # BLD AUTO: 210 K/UL (ref 150–450)
PLATELET # BLD AUTO: 241 K/UL (ref 150–450)
PMV BLD AUTO: 8.6 FL (ref 9.2–12.9)
PMV BLD AUTO: 8.6 FL (ref 9.2–12.9)
PO2 BLDA: 27 MMHG (ref 40–60)
PO2 BLDA: 30 MMHG (ref 40–60)
PO2 BLDA: 32 MMHG (ref 40–60)
PO2 BLDA: 35 MMHG (ref 40–60)
POC BE: 0 MMOL/L
POC BE: 1 MMOL/L
POC IONIZED CALCIUM: 1.19 MMOL/L (ref 1.06–1.42)
POC IONIZED CALCIUM: 1.21 MMOL/L (ref 1.06–1.42)
POC IONIZED CALCIUM: 1.21 MMOL/L (ref 1.06–1.42)
POC SATURATED O2: 46 % (ref 95–100)
POC SATURATED O2: 56 % (ref 95–100)
POC SATURATED O2: 60 % (ref 95–100)
POC SATURATED O2: 65 % (ref 95–100)
POC TCO2: 27 MMOL/L (ref 24–29)
POC TCO2: 28 MMOL/L (ref 24–29)
POCT GLUCOSE: 123 MG/DL (ref 70–110)
POCT GLUCOSE: 141 MG/DL (ref 70–110)
POCT GLUCOSE: 148 MG/DL (ref 70–110)
POCT GLUCOSE: 160 MG/DL (ref 70–110)
POCT GLUCOSE: 173 MG/DL (ref 70–110)
POCT GLUCOSE: 200 MG/DL (ref 70–110)
POCT GLUCOSE: 218 MG/DL (ref 70–110)
POTASSIUM BLD-SCNC: 3.4 MMOL/L (ref 3.5–5.1)
POTASSIUM BLD-SCNC: 3.4 MMOL/L (ref 3.5–5.1)
POTASSIUM BLD-SCNC: 3.6 MMOL/L (ref 3.5–5.1)
POTASSIUM SERPL-SCNC: 3.7 MMOL/L (ref 3.5–5.1)
RBC # BLD AUTO: 2.1 M/UL (ref 4–5.4)
RBC # BLD AUTO: 2.7 M/UL (ref 4–5.4)
SAMPLE: ABNORMAL
SITE: ABNORMAL
SODIUM BLD-SCNC: 131 MMOL/L (ref 136–145)
SODIUM SERPL-SCNC: 130 MMOL/L (ref 136–145)
TRANS ERYTHROCYTES VOL PATIENT: NORMAL ML
WBC # BLD AUTO: 6.64 K/UL (ref 3.9–12.7)
WBC # BLD AUTO: 7.54 K/UL (ref 3.9–12.7)

## 2021-12-19 PROCEDURE — 97535 SELF CARE MNGMENT TRAINING: CPT

## 2021-12-19 PROCEDURE — 82533 TOTAL CORTISOL: CPT | Performed by: STUDENT IN AN ORGANIZED HEALTH CARE EDUCATION/TRAINING PROGRAM

## 2021-12-19 PROCEDURE — 99900035 HC TECH TIME PER 15 MIN (STAT)

## 2021-12-19 PROCEDURE — 99233 PR SUBSEQUENT HOSPITAL CARE,LEVL III: ICD-10-PCS | Mod: ,,, | Performed by: EMERGENCY MEDICINE

## 2021-12-19 PROCEDURE — 84295 ASSAY OF SERUM SODIUM: CPT

## 2021-12-19 PROCEDURE — 94640 AIRWAY INHALATION TREATMENT: CPT

## 2021-12-19 PROCEDURE — 97168 OT RE-EVAL EST PLAN CARE: CPT

## 2021-12-19 PROCEDURE — P9021 RED BLOOD CELLS UNIT: HCPCS | Performed by: STUDENT IN AN ORGANIZED HEALTH CARE EDUCATION/TRAINING PROGRAM

## 2021-12-19 PROCEDURE — 20000000 HC ICU ROOM

## 2021-12-19 PROCEDURE — 80069 RENAL FUNCTION PANEL: CPT | Performed by: NURSE PRACTITIONER

## 2021-12-19 PROCEDURE — 25000003 PHARM REV CODE 250: Performed by: STUDENT IN AN ORGANIZED HEALTH CARE EDUCATION/TRAINING PROGRAM

## 2021-12-19 PROCEDURE — 94761 N-INVAS EAR/PLS OXIMETRY MLT: CPT

## 2021-12-19 PROCEDURE — 93010 ELECTROCARDIOGRAM REPORT: CPT | Mod: ,,, | Performed by: INTERNAL MEDICINE

## 2021-12-19 PROCEDURE — 85025 COMPLETE CBC W/AUTO DIFF WBC: CPT | Mod: 91 | Performed by: EMERGENCY MEDICINE

## 2021-12-19 PROCEDURE — 99233 PR SUBSEQUENT HOSPITAL CARE,LEVL III: ICD-10-PCS | Mod: ,,, | Performed by: INTERNAL MEDICINE

## 2021-12-19 PROCEDURE — 27000646 HC AEROBIKA DEVICE

## 2021-12-19 PROCEDURE — 25000242 PHARM REV CODE 250 ALT 637 W/ HCPCS: Performed by: EMERGENCY MEDICINE

## 2021-12-19 PROCEDURE — 86870 RBC ANTIBODY IDENTIFICATION: CPT | Performed by: EMERGENCY MEDICINE

## 2021-12-19 PROCEDURE — 25000003 PHARM REV CODE 250: Performed by: INTERNAL MEDICINE

## 2021-12-19 PROCEDURE — 86900 BLOOD TYPING SEROLOGIC ABO: CPT | Performed by: EMERGENCY MEDICINE

## 2021-12-19 PROCEDURE — 93005 ELECTROCARDIOGRAM TRACING: CPT

## 2021-12-19 PROCEDURE — 94664 DEMO&/EVAL PT USE INHALER: CPT

## 2021-12-19 PROCEDURE — 82803 BLOOD GASES ANY COMBINATION: CPT

## 2021-12-19 PROCEDURE — 85025 COMPLETE CBC W/AUTO DIFF WBC: CPT | Performed by: NURSE PRACTITIONER

## 2021-12-19 PROCEDURE — 94799 UNLISTED PULMONARY SVC/PX: CPT

## 2021-12-19 PROCEDURE — 25000003 PHARM REV CODE 250: Performed by: CLINICAL NURSE SPECIALIST

## 2021-12-19 PROCEDURE — 97164 PT RE-EVAL EST PLAN CARE: CPT

## 2021-12-19 PROCEDURE — 97530 THERAPEUTIC ACTIVITIES: CPT

## 2021-12-19 PROCEDURE — 99499 NO LOS: ICD-10-PCS | Mod: ,,, | Performed by: PHYSICIAN ASSISTANT

## 2021-12-19 PROCEDURE — 99233 SBSQ HOSP IP/OBS HIGH 50: CPT | Mod: ,,, | Performed by: INTERNAL MEDICINE

## 2021-12-19 PROCEDURE — 93010 EKG 12-LEAD: ICD-10-PCS | Mod: ,,, | Performed by: INTERNAL MEDICINE

## 2021-12-19 PROCEDURE — 99233 SBSQ HOSP IP/OBS HIGH 50: CPT | Mod: ,,, | Performed by: EMERGENCY MEDICINE

## 2021-12-19 PROCEDURE — 99499 UNLISTED E&M SERVICE: CPT | Mod: ,,, | Performed by: PHYSICIAN ASSISTANT

## 2021-12-19 RX ORDER — HYDROCODONE BITARTRATE AND ACETAMINOPHEN 500; 5 MG/1; MG/1
TABLET ORAL
Status: DISCONTINUED | OUTPATIENT
Start: 2021-12-19 | End: 2021-12-20

## 2021-12-19 RX ORDER — SODIUM CHLORIDE 9 MG/ML
INJECTION, SOLUTION INTRAVENOUS ONCE
Status: DISCONTINUED | OUTPATIENT
Start: 2021-12-20 | End: 2021-12-21

## 2021-12-19 RX ORDER — IPRATROPIUM BROMIDE AND ALBUTEROL SULFATE 2.5; .5 MG/3ML; MG/3ML
3 SOLUTION RESPIRATORY (INHALATION)
Status: DISCONTINUED | OUTPATIENT
Start: 2021-12-19 | End: 2021-12-30 | Stop reason: HOSPADM

## 2021-12-19 RX ORDER — HEPARIN SODIUM 1000 [USP'U]/ML
1000 INJECTION, SOLUTION INTRAVENOUS; SUBCUTANEOUS
Status: DISCONTINUED | OUTPATIENT
Start: 2021-12-20 | End: 2021-12-22

## 2021-12-19 RX ADMIN — INSULIN ASPART 1 UNITS: 100 INJECTION, SOLUTION INTRAVENOUS; SUBCUTANEOUS at 08:12

## 2021-12-19 RX ADMIN — APIXABAN 2.5 MG: 2.5 TABLET, FILM COATED ORAL at 08:12

## 2021-12-19 RX ADMIN — LIDOCAINE 1 PATCH: 50 PATCH CUTANEOUS at 08:12

## 2021-12-19 RX ADMIN — ATORVASTATIN CALCIUM 80 MG: 20 TABLET, FILM COATED ORAL at 08:12

## 2021-12-19 RX ADMIN — IPRATROPIUM BROMIDE AND ALBUTEROL SULFATE 3 ML: .5; 3 SOLUTION RESPIRATORY (INHALATION) at 01:12

## 2021-12-19 RX ADMIN — INSULIN ASPART 2 UNITS: 100 INJECTION, SOLUTION INTRAVENOUS; SUBCUTANEOUS at 05:12

## 2021-12-19 RX ADMIN — IPRATROPIUM BROMIDE AND ALBUTEROL SULFATE 3 ML: .5; 3 SOLUTION RESPIRATORY (INHALATION) at 07:12

## 2021-12-19 RX ADMIN — INSULIN ASPART 4 UNITS: 100 INJECTION, SOLUTION INTRAVENOUS; SUBCUTANEOUS at 12:12

## 2021-12-19 RX ADMIN — LEVOTHYROXINE SODIUM 50 MCG: 50 TABLET ORAL at 05:12

## 2021-12-19 RX ADMIN — ASPIRIN 81 MG CHEWABLE TABLET 81 MG: 81 TABLET CHEWABLE at 08:12

## 2021-12-19 RX ADMIN — FAMOTIDINE 20 MG: 20 TABLET ORAL at 08:12

## 2021-12-19 RX ADMIN — ANASTROZOLE 1 MG: 1 TABLET, COATED ORAL at 08:12

## 2021-12-19 RX ADMIN — IPRATROPIUM BROMIDE AND ALBUTEROL SULFATE 3 ML: .5; 3 SOLUTION RESPIRATORY (INHALATION) at 08:12

## 2021-12-19 RX ADMIN — HYDROCODONE BITARTRATE AND ACETAMINOPHEN 1 TABLET: 10; 325 TABLET ORAL at 05:12

## 2021-12-20 PROBLEM — R31.0 GROSS HEMATURIA: Status: ACTIVE | Noted: 2021-12-20

## 2021-12-20 LAB
ALBUMIN SERPL BCP-MCNC: 1.7 G/DL (ref 3.5–5.2)
ALP SERPL-CCNC: 154 U/L (ref 55–135)
ALT SERPL W/O P-5'-P-CCNC: 18 U/L (ref 10–44)
ANION GAP SERPL CALC-SCNC: 6 MMOL/L (ref 8–16)
AST SERPL-CCNC: 15 U/L (ref 10–40)
BASOPHILS # BLD AUTO: 0.04 K/UL (ref 0–0.2)
BASOPHILS NFR BLD: 0.6 % (ref 0–1.9)
BILIRUB SERPL-MCNC: 0.4 MG/DL (ref 0.1–1)
BUN SERPL-MCNC: 17 MG/DL (ref 8–23)
CALCIUM SERPL-MCNC: 7.5 MG/DL (ref 8.7–10.5)
CHLORIDE SERPL-SCNC: 98 MMOL/L (ref 95–110)
CO2 SERPL-SCNC: 22 MMOL/L (ref 23–29)
CREAT SERPL-MCNC: 1.8 MG/DL (ref 0.5–1.4)
DIFFERENTIAL METHOD: ABNORMAL
EOSINOPHIL # BLD AUTO: 0.1 K/UL (ref 0–0.5)
EOSINOPHIL NFR BLD: 1.9 % (ref 0–8)
ERYTHROCYTE [DISTWIDTH] IN BLOOD BY AUTOMATED COUNT: 16.2 % (ref 11.5–14.5)
EST. GFR  (AFRICAN AMERICAN): 32.6 ML/MIN/1.73 M^2
EST. GFR  (NON AFRICAN AMERICAN): 28.3 ML/MIN/1.73 M^2
GLUCOSE SERPL-MCNC: 124 MG/DL (ref 70–110)
HCT VFR BLD AUTO: 23.3 % (ref 37–48.5)
HGB BLD-MCNC: 7.3 G/DL (ref 12–16)
IMM GRANULOCYTES # BLD AUTO: 0.08 K/UL (ref 0–0.04)
IMM GRANULOCYTES NFR BLD AUTO: 1.1 % (ref 0–0.5)
LYMPHOCYTES # BLD AUTO: 1.4 K/UL (ref 1–4.8)
LYMPHOCYTES NFR BLD: 20.2 % (ref 18–48)
MAGNESIUM SERPL-MCNC: 1.7 MG/DL (ref 1.6–2.6)
MCH RBC QN AUTO: 28.7 PG (ref 27–31)
MCHC RBC AUTO-ENTMCNC: 31.3 G/DL (ref 32–36)
MCV RBC AUTO: 92 FL (ref 82–98)
MONOCYTES # BLD AUTO: 1 K/UL (ref 0.3–1)
MONOCYTES NFR BLD: 14.5 % (ref 4–15)
NEUTROPHILS # BLD AUTO: 4.3 K/UL (ref 1.8–7.7)
NEUTROPHILS NFR BLD: 61.7 % (ref 38–73)
NRBC BLD-RTO: 0 /100 WBC
PLATELET # BLD AUTO: 240 K/UL (ref 150–450)
PMV BLD AUTO: 8.9 FL (ref 9.2–12.9)
POCT GLUCOSE: 142 MG/DL (ref 70–110)
POCT GLUCOSE: 169 MG/DL (ref 70–110)
POCT GLUCOSE: 175 MG/DL (ref 70–110)
POCT GLUCOSE: 205 MG/DL (ref 70–110)
POTASSIUM SERPL-SCNC: 4.2 MMOL/L (ref 3.5–5.1)
PROT SERPL-MCNC: 4.3 G/DL (ref 6–8.4)
RBC # BLD AUTO: 2.54 M/UL (ref 4–5.4)
SODIUM SERPL-SCNC: 126 MMOL/L (ref 136–145)
WBC # BLD AUTO: 6.98 K/UL (ref 3.9–12.7)

## 2021-12-20 PROCEDURE — 25000003 PHARM REV CODE 250: Performed by: CLINICAL NURSE SPECIALIST

## 2021-12-20 PROCEDURE — 25000003 PHARM REV CODE 250: Performed by: INTERNAL MEDICINE

## 2021-12-20 PROCEDURE — 87077 CULTURE AEROBIC IDENTIFY: CPT | Performed by: STUDENT IN AN ORGANIZED HEALTH CARE EDUCATION/TRAINING PROGRAM

## 2021-12-20 PROCEDURE — 63600175 PHARM REV CODE 636 W HCPCS: Mod: JG | Performed by: STUDENT IN AN ORGANIZED HEALTH CARE EDUCATION/TRAINING PROGRAM

## 2021-12-20 PROCEDURE — 99900035 HC TECH TIME PER 15 MIN (STAT)

## 2021-12-20 PROCEDURE — 99233 SBSQ HOSP IP/OBS HIGH 50: CPT | Mod: ,,, | Performed by: NURSE PRACTITIONER

## 2021-12-20 PROCEDURE — 87088 URINE BACTERIA CULTURE: CPT | Performed by: STUDENT IN AN ORGANIZED HEALTH CARE EDUCATION/TRAINING PROGRAM

## 2021-12-20 PROCEDURE — 87086 URINE CULTURE/COLONY COUNT: CPT | Performed by: STUDENT IN AN ORGANIZED HEALTH CARE EDUCATION/TRAINING PROGRAM

## 2021-12-20 PROCEDURE — 99233 PR SUBSEQUENT HOSPITAL CARE,LEVL III: ICD-10-PCS | Mod: ,,, | Performed by: INTERNAL MEDICINE

## 2021-12-20 PROCEDURE — 94761 N-INVAS EAR/PLS OXIMETRY MLT: CPT

## 2021-12-20 PROCEDURE — 87186 SC STD MICRODIL/AGAR DIL: CPT | Performed by: STUDENT IN AN ORGANIZED HEALTH CARE EDUCATION/TRAINING PROGRAM

## 2021-12-20 PROCEDURE — 83735 ASSAY OF MAGNESIUM: CPT | Performed by: PHYSICIAN ASSISTANT

## 2021-12-20 PROCEDURE — 20000000 HC ICU ROOM

## 2021-12-20 PROCEDURE — 25000003 PHARM REV CODE 250: Performed by: STUDENT IN AN ORGANIZED HEALTH CARE EDUCATION/TRAINING PROGRAM

## 2021-12-20 PROCEDURE — 80053 COMPREHEN METABOLIC PANEL: CPT | Performed by: PHYSICIAN ASSISTANT

## 2021-12-20 PROCEDURE — 99233 SBSQ HOSP IP/OBS HIGH 50: CPT | Mod: ,,, | Performed by: INTERNAL MEDICINE

## 2021-12-20 PROCEDURE — 94799 UNLISTED PULMONARY SVC/PX: CPT

## 2021-12-20 PROCEDURE — 85025 COMPLETE CBC W/AUTO DIFF WBC: CPT | Performed by: PHYSICIAN ASSISTANT

## 2021-12-20 PROCEDURE — 80100014 HC HEMODIALYSIS 1:1

## 2021-12-20 PROCEDURE — 99233 PR SUBSEQUENT HOSPITAL CARE,LEVL III: ICD-10-PCS | Mod: ,,, | Performed by: NURSE PRACTITIONER

## 2021-12-20 PROCEDURE — 25000242 PHARM REV CODE 250 ALT 637 W/ HCPCS: Performed by: EMERGENCY MEDICINE

## 2021-12-20 PROCEDURE — 94640 AIRWAY INHALATION TREATMENT: CPT

## 2021-12-20 RX ORDER — HEPARIN SODIUM 1000 [USP'U]/ML
1000 INJECTION, SOLUTION INTRAVENOUS; SUBCUTANEOUS
Status: DISCONTINUED | OUTPATIENT
Start: 2021-12-21 | End: 2021-12-22

## 2021-12-20 RX ORDER — SODIUM CHLORIDE 9 MG/ML
INJECTION, SOLUTION INTRAVENOUS ONCE
Status: DISCONTINUED | OUTPATIENT
Start: 2021-12-21 | End: 2021-12-22

## 2021-12-20 RX ADMIN — APIXABAN 2.5 MG: 2.5 TABLET, FILM COATED ORAL at 09:12

## 2021-12-20 RX ADMIN — INSULIN ASPART 2 UNITS: 100 INJECTION, SOLUTION INTRAVENOUS; SUBCUTANEOUS at 05:12

## 2021-12-20 RX ADMIN — INSULIN ASPART 2 UNITS: 100 INJECTION, SOLUTION INTRAVENOUS; SUBCUTANEOUS at 12:12

## 2021-12-20 RX ADMIN — IPRATROPIUM BROMIDE AND ALBUTEROL SULFATE 3 ML: .5; 3 SOLUTION RESPIRATORY (INHALATION) at 07:12

## 2021-12-20 RX ADMIN — ATORVASTATIN CALCIUM 80 MG: 20 TABLET, FILM COATED ORAL at 09:12

## 2021-12-20 RX ADMIN — ANASTROZOLE 1 MG: 1 TABLET, COATED ORAL at 09:12

## 2021-12-20 RX ADMIN — ASPIRIN 81 MG CHEWABLE TABLET 81 MG: 81 TABLET CHEWABLE at 09:12

## 2021-12-20 RX ADMIN — HYDROCODONE BITARTRATE AND ACETAMINOPHEN 1 TABLET: 10; 325 TABLET ORAL at 09:12

## 2021-12-20 RX ADMIN — INSULIN ASPART 2 UNITS: 100 INJECTION, SOLUTION INTRAVENOUS; SUBCUTANEOUS at 08:12

## 2021-12-20 RX ADMIN — HYDROCODONE BITARTRATE AND ACETAMINOPHEN 1 TABLET: 10; 325 TABLET ORAL at 01:12

## 2021-12-20 RX ADMIN — LEVOTHYROXINE SODIUM 50 MCG: 50 TABLET ORAL at 06:12

## 2021-12-20 RX ADMIN — IPRATROPIUM BROMIDE AND ALBUTEROL SULFATE 3 ML: .5; 3 SOLUTION RESPIRATORY (INHALATION) at 12:12

## 2021-12-20 RX ADMIN — HYDROCODONE BITARTRATE AND ACETAMINOPHEN 1 TABLET: 10; 325 TABLET ORAL at 07:12

## 2021-12-20 RX ADMIN — FAMOTIDINE 20 MG: 20 TABLET ORAL at 09:12

## 2021-12-20 RX ADMIN — EPOETIN ALFA-EPBX 6130 UNITS: 4000 INJECTION, SOLUTION INTRAVENOUS; SUBCUTANEOUS at 10:12

## 2021-12-20 RX ADMIN — APIXABAN 2.5 MG: 2.5 TABLET, FILM COATED ORAL at 08:12

## 2021-12-21 LAB
ALBUMIN SERPL BCP-MCNC: 1.7 G/DL (ref 3.5–5.2)
ALBUMIN SERPL BCP-MCNC: 1.7 G/DL (ref 3.5–5.2)
ALP SERPL-CCNC: 155 U/L (ref 55–135)
ALP SERPL-CCNC: 155 U/L (ref 55–135)
ALT SERPL W/O P-5'-P-CCNC: 18 U/L (ref 10–44)
ALT SERPL W/O P-5'-P-CCNC: 18 U/L (ref 10–44)
ANION GAP SERPL CALC-SCNC: 6 MMOL/L (ref 8–16)
ANION GAP SERPL CALC-SCNC: 6 MMOL/L (ref 8–16)
AST SERPL-CCNC: 17 U/L (ref 10–40)
AST SERPL-CCNC: 17 U/L (ref 10–40)
BILIRUB SERPL-MCNC: 0.4 MG/DL (ref 0.1–1)
BILIRUB SERPL-MCNC: 0.4 MG/DL (ref 0.1–1)
BUN SERPL-MCNC: 14 MG/DL (ref 8–23)
BUN SERPL-MCNC: 14 MG/DL (ref 8–23)
CALCIUM SERPL-MCNC: 7.7 MG/DL (ref 8.7–10.5)
CALCIUM SERPL-MCNC: 7.7 MG/DL (ref 8.7–10.5)
CHLORIDE SERPL-SCNC: 98 MMOL/L (ref 95–110)
CHLORIDE SERPL-SCNC: 98 MMOL/L (ref 95–110)
CO2 SERPL-SCNC: 25 MMOL/L (ref 23–29)
CO2 SERPL-SCNC: 25 MMOL/L (ref 23–29)
CREAT SERPL-MCNC: 1.4 MG/DL (ref 0.5–1.4)
CREAT SERPL-MCNC: 1.4 MG/DL (ref 0.5–1.4)
EST. GFR  (AFRICAN AMERICAN): 44.2 ML/MIN/1.73 M^2
EST. GFR  (AFRICAN AMERICAN): 44.2 ML/MIN/1.73 M^2
EST. GFR  (NON AFRICAN AMERICAN): 38.4 ML/MIN/1.73 M^2
EST. GFR  (NON AFRICAN AMERICAN): 38.4 ML/MIN/1.73 M^2
ESTIMATED AVG GLUCOSE: 105 MG/DL (ref 68–131)
GLUCOSE SERPL-MCNC: 127 MG/DL (ref 70–110)
GLUCOSE SERPL-MCNC: 127 MG/DL (ref 70–110)
HBA1C MFR BLD: 5.3 % (ref 4–5.6)
POCT GLUCOSE: 114 MG/DL (ref 70–110)
POCT GLUCOSE: 137 MG/DL (ref 70–110)
POCT GLUCOSE: 137 MG/DL (ref 70–110)
POCT GLUCOSE: 146 MG/DL (ref 70–110)
POCT GLUCOSE: 146 MG/DL (ref 70–110)
POCT GLUCOSE: 184 MG/DL (ref 70–110)
POTASSIUM SERPL-SCNC: 3.8 MMOL/L (ref 3.5–5.1)
POTASSIUM SERPL-SCNC: 3.8 MMOL/L (ref 3.5–5.1)
PROT SERPL-MCNC: 4.3 G/DL (ref 6–8.4)
PROT SERPL-MCNC: 4.3 G/DL (ref 6–8.4)
SARS-COV-2 RNA RESP QL NAA+PROBE: NOT DETECTED
SODIUM SERPL-SCNC: 129 MMOL/L (ref 136–145)
SODIUM SERPL-SCNC: 129 MMOL/L (ref 136–145)

## 2021-12-21 PROCEDURE — 99900035 HC TECH TIME PER 15 MIN (STAT)

## 2021-12-21 PROCEDURE — 97530 THERAPEUTIC ACTIVITIES: CPT

## 2021-12-21 PROCEDURE — 94640 AIRWAY INHALATION TREATMENT: CPT

## 2021-12-21 PROCEDURE — 63600175 PHARM REV CODE 636 W HCPCS: Performed by: HOSPITALIST

## 2021-12-21 PROCEDURE — 93010 EKG 12-LEAD: ICD-10-PCS | Mod: ,,, | Performed by: INTERNAL MEDICINE

## 2021-12-21 PROCEDURE — 25000003 PHARM REV CODE 250: Performed by: INTERNAL MEDICINE

## 2021-12-21 PROCEDURE — 25000003 PHARM REV CODE 250: Performed by: HOSPITALIST

## 2021-12-21 PROCEDURE — 99232 SBSQ HOSP IP/OBS MODERATE 35: CPT | Mod: ,,, | Performed by: HOSPITALIST

## 2021-12-21 PROCEDURE — 11000001 HC ACUTE MED/SURG PRIVATE ROOM

## 2021-12-21 PROCEDURE — 25000003 PHARM REV CODE 250: Performed by: CLINICAL NURSE SPECIALIST

## 2021-12-21 PROCEDURE — 99232 SBSQ HOSP IP/OBS MODERATE 35: CPT | Mod: ,,, | Performed by: INTERNAL MEDICINE

## 2021-12-21 PROCEDURE — U0005 INFEC AGEN DETEC AMPLI PROBE: HCPCS | Performed by: HOSPITALIST

## 2021-12-21 PROCEDURE — 99232 PR SUBSEQUENT HOSPITAL CARE,LEVL II: ICD-10-PCS | Mod: ,,, | Performed by: HOSPITALIST

## 2021-12-21 PROCEDURE — U0003 INFECTIOUS AGENT DETECTION BY NUCLEIC ACID (DNA OR RNA); SEVERE ACUTE RESPIRATORY SYNDROME CORONAVIRUS 2 (SARS-COV-2) (CORONAVIRUS DISEASE [COVID-19]), AMPLIFIED PROBE TECHNIQUE, MAKING USE OF HIGH THROUGHPUT TECHNOLOGIES AS DESCRIBED BY CMS-2020-01-R: HCPCS | Performed by: HOSPITALIST

## 2021-12-21 PROCEDURE — 97535 SELF CARE MNGMENT TRAINING: CPT

## 2021-12-21 PROCEDURE — 99232 PR SUBSEQUENT HOSPITAL CARE,LEVL II: ICD-10-PCS | Mod: ,,, | Performed by: INTERNAL MEDICINE

## 2021-12-21 PROCEDURE — 25000003 PHARM REV CODE 250: Performed by: RADIOLOGY

## 2021-12-21 PROCEDURE — 30200315 PPD INTRADERMAL TEST REV CODE 302: Performed by: HOSPITALIST

## 2021-12-21 PROCEDURE — 25000003 PHARM REV CODE 250: Performed by: STUDENT IN AN ORGANIZED HEALTH CARE EDUCATION/TRAINING PROGRAM

## 2021-12-21 PROCEDURE — 25000242 PHARM REV CODE 250 ALT 637 W/ HCPCS: Performed by: EMERGENCY MEDICINE

## 2021-12-21 PROCEDURE — 94761 N-INVAS EAR/PLS OXIMETRY MLT: CPT

## 2021-12-21 PROCEDURE — 93005 ELECTROCARDIOGRAM TRACING: CPT

## 2021-12-21 PROCEDURE — 63600175 PHARM REV CODE 636 W HCPCS: Performed by: RADIOLOGY

## 2021-12-21 PROCEDURE — 83036 HEMOGLOBIN GLYCOSYLATED A1C: CPT | Performed by: STUDENT IN AN ORGANIZED HEALTH CARE EDUCATION/TRAINING PROGRAM

## 2021-12-21 PROCEDURE — 86580 TB INTRADERMAL TEST: CPT | Performed by: HOSPITALIST

## 2021-12-21 PROCEDURE — 80053 COMPREHEN METABOLIC PANEL: CPT | Performed by: PHYSICIAN ASSISTANT

## 2021-12-21 PROCEDURE — 93010 ELECTROCARDIOGRAM REPORT: CPT | Mod: ,,, | Performed by: INTERNAL MEDICINE

## 2021-12-21 RX ORDER — MIDAZOLAM HYDROCHLORIDE 1 MG/ML
INJECTION INTRAMUSCULAR; INTRAVENOUS CODE/TRAUMA/SEDATION MEDICATION
Status: COMPLETED | OUTPATIENT
Start: 2021-12-21 | End: 2021-12-21

## 2021-12-21 RX ORDER — METOPROLOL TARTRATE 1 MG/ML
5 INJECTION, SOLUTION INTRAVENOUS EVERY 5 MIN PRN
Status: DISCONTINUED | OUTPATIENT
Start: 2021-12-21 | End: 2021-12-30 | Stop reason: HOSPADM

## 2021-12-21 RX ORDER — SODIUM CHLORIDE 9 MG/ML
INJECTION, SOLUTION INTRAVENOUS ONCE
Status: COMPLETED | OUTPATIENT
Start: 2021-12-22 | End: 2021-12-22

## 2021-12-21 RX ORDER — FENTANYL CITRATE 50 UG/ML
INJECTION, SOLUTION INTRAMUSCULAR; INTRAVENOUS CODE/TRAUMA/SEDATION MEDICATION
Status: COMPLETED | OUTPATIENT
Start: 2021-12-21 | End: 2021-12-21

## 2021-12-21 RX ORDER — METOPROLOL TARTRATE 25 MG/1
25 TABLET, FILM COATED ORAL 2 TIMES DAILY
Status: DISCONTINUED | OUTPATIENT
Start: 2021-12-21 | End: 2021-12-30 | Stop reason: HOSPADM

## 2021-12-21 RX ORDER — METOPROLOL TARTRATE 1 MG/ML
INJECTION, SOLUTION INTRAVENOUS CODE/TRAUMA/SEDATION MEDICATION
Status: COMPLETED | OUTPATIENT
Start: 2021-12-21 | End: 2021-12-21

## 2021-12-21 RX ORDER — HEPARIN SODIUM 1000 [USP'U]/ML
1000 INJECTION, SOLUTION INTRAVENOUS; SUBCUTANEOUS
Status: DISCONTINUED | OUTPATIENT
Start: 2021-12-22 | End: 2021-12-27

## 2021-12-21 RX ORDER — LIDOCAINE HYDROCHLORIDE 10 MG/ML
INJECTION INFILTRATION; PERINEURAL CODE/TRAUMA/SEDATION MEDICATION
Status: COMPLETED | OUTPATIENT
Start: 2021-12-21 | End: 2021-12-21

## 2021-12-21 RX ORDER — TETRACYCLINE HYDROCHLORIDE 500 MG/1
500 CAPSULE ORAL 2 TIMES DAILY
Status: DISCONTINUED | OUTPATIENT
Start: 2021-12-21 | End: 2021-12-22

## 2021-12-21 RX ADMIN — HYDROCODONE BITARTRATE AND ACETAMINOPHEN 1 TABLET: 10; 325 TABLET ORAL at 12:12

## 2021-12-21 RX ADMIN — TUBERCULIN PURIFIED PROTEIN DERIVATIVE 5 UNITS: 5 INJECTION, SOLUTION INTRADERMAL at 11:12

## 2021-12-21 RX ADMIN — ATORVASTATIN CALCIUM 80 MG: 20 TABLET, FILM COATED ORAL at 09:12

## 2021-12-21 RX ADMIN — AMPICILLIN AND SULBACTAM 1.5 G: 1; .5 INJECTION, POWDER, FOR SOLUTION INTRAVENOUS at 09:12

## 2021-12-21 RX ADMIN — METOPROLOL TARTRATE 25 MG: 25 TABLET, FILM COATED ORAL at 05:12

## 2021-12-21 RX ADMIN — METOPROLOL TARTRATE 5 MG: 5 INJECTION, SOLUTION INTRAVENOUS at 04:12

## 2021-12-21 RX ADMIN — FENTANYL CITRATE 25 MCG: 0.05 INJECTION, SOLUTION INTRAMUSCULAR; INTRAVENOUS at 03:12

## 2021-12-21 RX ADMIN — TETRACYCLINE HYDROCHLORIDE 500 MG: 500 CAPSULE ORAL at 09:12

## 2021-12-21 RX ADMIN — AMPICILLIN AND SULBACTAM 1.5 G: 1; .5 INJECTION, POWDER, FOR SOLUTION INTRAVENOUS at 02:12

## 2021-12-21 RX ADMIN — LIDOCAINE HYDROCHLORIDE 5 ML: 10 INJECTION, SOLUTION INFILTRATION; PERINEURAL at 03:12

## 2021-12-21 RX ADMIN — ANASTROZOLE 1 MG: 1 TABLET, COATED ORAL at 09:12

## 2021-12-21 RX ADMIN — MIDAZOLAM HYDROCHLORIDE 2 MG: 1 INJECTION, SOLUTION INTRAMUSCULAR; INTRAVENOUS at 03:12

## 2021-12-21 RX ADMIN — HYDROCODONE BITARTRATE AND ACETAMINOPHEN 1 TABLET: 10; 325 TABLET ORAL at 06:12

## 2021-12-21 RX ADMIN — IPRATROPIUM BROMIDE AND ALBUTEROL SULFATE 3 ML: .5; 3 SOLUTION RESPIRATORY (INHALATION) at 01:12

## 2021-12-21 RX ADMIN — IPRATROPIUM BROMIDE AND ALBUTEROL SULFATE 3 ML: .5; 3 SOLUTION RESPIRATORY (INHALATION) at 07:12

## 2021-12-21 RX ADMIN — FAMOTIDINE 20 MG: 20 TABLET ORAL at 09:12

## 2021-12-21 RX ADMIN — IPRATROPIUM BROMIDE AND ALBUTEROL SULFATE 3 ML: .5; 3 SOLUTION RESPIRATORY (INHALATION) at 03:12

## 2021-12-21 RX ADMIN — LEVOTHYROXINE SODIUM 50 MCG: 50 TABLET ORAL at 05:12

## 2021-12-22 ENCOUNTER — ANESTHESIA EVENT (OUTPATIENT)
Dept: SURGERY | Facility: HOSPITAL | Age: 71
DRG: 659 | End: 2021-12-22
Payer: MEDICARE

## 2021-12-22 LAB
ALBUMIN SERPL BCP-MCNC: 1.6 G/DL (ref 3.5–5.2)
ALP SERPL-CCNC: 166 U/L (ref 55–135)
ALT SERPL W/O P-5'-P-CCNC: 18 U/L (ref 10–44)
ANION GAP SERPL CALC-SCNC: 5 MMOL/L (ref 8–16)
AST SERPL-CCNC: 20 U/L (ref 10–40)
BASOPHILS # BLD AUTO: 0.04 K/UL (ref 0–0.2)
BASOPHILS NFR BLD: 0.6 % (ref 0–1.9)
BILIRUB SERPL-MCNC: 0.3 MG/DL (ref 0.1–1)
BUN SERPL-MCNC: 18 MG/DL (ref 8–23)
CALCIUM SERPL-MCNC: 8 MG/DL (ref 8.7–10.5)
CHLORIDE SERPL-SCNC: 99 MMOL/L (ref 95–110)
CO2 SERPL-SCNC: 26 MMOL/L (ref 23–29)
CREAT SERPL-MCNC: 1.9 MG/DL (ref 0.5–1.4)
DIFFERENTIAL METHOD: ABNORMAL
EOSINOPHIL # BLD AUTO: 0.1 K/UL (ref 0–0.5)
EOSINOPHIL NFR BLD: 1.9 % (ref 0–8)
ERYTHROCYTE [DISTWIDTH] IN BLOOD BY AUTOMATED COUNT: 15.6 % (ref 11.5–14.5)
EST. GFR  (AFRICAN AMERICAN): 30.6 ML/MIN/1.73 M^2
EST. GFR  (NON AFRICAN AMERICAN): 26.5 ML/MIN/1.73 M^2
GLUCOSE SERPL-MCNC: 143 MG/DL (ref 70–110)
HCT VFR BLD AUTO: 24.9 % (ref 37–48.5)
HGB BLD-MCNC: 7.7 G/DL (ref 12–16)
IMM GRANULOCYTES # BLD AUTO: 0.09 K/UL (ref 0–0.04)
IMM GRANULOCYTES NFR BLD AUTO: 1.3 % (ref 0–0.5)
LYMPHOCYTES # BLD AUTO: 1 K/UL (ref 1–4.8)
LYMPHOCYTES NFR BLD: 14.2 % (ref 18–48)
MCH RBC QN AUTO: 28.8 PG (ref 27–31)
MCHC RBC AUTO-ENTMCNC: 30.9 G/DL (ref 32–36)
MCV RBC AUTO: 93 FL (ref 82–98)
MONOCYTES # BLD AUTO: 0.8 K/UL (ref 0.3–1)
MONOCYTES NFR BLD: 11.2 % (ref 4–15)
NEUTROPHILS # BLD AUTO: 4.9 K/UL (ref 1.8–7.7)
NEUTROPHILS NFR BLD: 70.8 % (ref 38–73)
NRBC BLD-RTO: 0 /100 WBC
PLATELET # BLD AUTO: 315 K/UL (ref 150–450)
PMV BLD AUTO: 8.9 FL (ref 9.2–12.9)
POCT GLUCOSE: 132 MG/DL (ref 70–110)
POCT GLUCOSE: 150 MG/DL (ref 70–110)
POCT GLUCOSE: 162 MG/DL (ref 70–110)
POCT GLUCOSE: 171 MG/DL (ref 70–110)
POCT GLUCOSE: 177 MG/DL (ref 70–110)
POTASSIUM SERPL-SCNC: 3.9 MMOL/L (ref 3.5–5.1)
PROT SERPL-MCNC: 4.9 G/DL (ref 6–8.4)
RBC # BLD AUTO: 2.67 M/UL (ref 4–5.4)
SODIUM SERPL-SCNC: 130 MMOL/L (ref 136–145)
WBC # BLD AUTO: 6.85 K/UL (ref 3.9–12.7)

## 2021-12-22 PROCEDURE — 94799 UNLISTED PULMONARY SVC/PX: CPT

## 2021-12-22 PROCEDURE — 85025 COMPLETE CBC W/AUTO DIFF WBC: CPT | Performed by: HOSPITALIST

## 2021-12-22 PROCEDURE — 63600175 PHARM REV CODE 636 W HCPCS: Mod: JG | Performed by: STUDENT IN AN ORGANIZED HEALTH CARE EDUCATION/TRAINING PROGRAM

## 2021-12-22 PROCEDURE — 25000003 PHARM REV CODE 250: Performed by: INTERNAL MEDICINE

## 2021-12-22 PROCEDURE — 86709 HEPATITIS A IGM ANTIBODY: CPT | Performed by: HOSPITALIST

## 2021-12-22 PROCEDURE — 25000003 PHARM REV CODE 250: Performed by: STUDENT IN AN ORGANIZED HEALTH CARE EDUCATION/TRAINING PROGRAM

## 2021-12-22 PROCEDURE — 99900035 HC TECH TIME PER 15 MIN (STAT)

## 2021-12-22 PROCEDURE — 86706 HEP B SURFACE ANTIBODY: CPT | Performed by: HOSPITALIST

## 2021-12-22 PROCEDURE — 25000003 PHARM REV CODE 250: Performed by: HOSPITALIST

## 2021-12-22 PROCEDURE — 25000003 PHARM REV CODE 250: Performed by: CLINICAL NURSE SPECIALIST

## 2021-12-22 PROCEDURE — 80053 COMPREHEN METABOLIC PANEL: CPT | Performed by: PHYSICIAN ASSISTANT

## 2021-12-22 PROCEDURE — 90935 HEMODIALYSIS ONE EVALUATION: CPT

## 2021-12-22 PROCEDURE — 25000242 PHARM REV CODE 250 ALT 637 W/ HCPCS: Performed by: EMERGENCY MEDICINE

## 2021-12-22 PROCEDURE — 63600175 PHARM REV CODE 636 W HCPCS: Performed by: HOSPITALIST

## 2021-12-22 PROCEDURE — 90935 HEMODIALYSIS ONE EVALUATION: CPT | Mod: GC,,, | Performed by: INTERNAL MEDICINE

## 2021-12-22 PROCEDURE — 11000001 HC ACUTE MED/SURG PRIVATE ROOM

## 2021-12-22 PROCEDURE — 86704 HEP B CORE ANTIBODY TOTAL: CPT | Performed by: HOSPITALIST

## 2021-12-22 PROCEDURE — 99232 PR SUBSEQUENT HOSPITAL CARE,LEVL II: ICD-10-PCS | Mod: ,,, | Performed by: HOSPITALIST

## 2021-12-22 PROCEDURE — 27000646 HC AEROBIKA DEVICE

## 2021-12-22 PROCEDURE — 99232 SBSQ HOSP IP/OBS MODERATE 35: CPT | Mod: ,,, | Performed by: HOSPITALIST

## 2021-12-22 PROCEDURE — 94761 N-INVAS EAR/PLS OXIMETRY MLT: CPT

## 2021-12-22 PROCEDURE — 94664 DEMO&/EVAL PT USE INHALER: CPT

## 2021-12-22 PROCEDURE — 94640 AIRWAY INHALATION TREATMENT: CPT

## 2021-12-22 PROCEDURE — 90935 PR HEMODIALYSIS, ONE EVALUATION: ICD-10-PCS | Mod: GC,,, | Performed by: INTERNAL MEDICINE

## 2021-12-22 PROCEDURE — 87340 HEPATITIS B SURFACE AG IA: CPT | Performed by: HOSPITALIST

## 2021-12-22 PROCEDURE — 27000221 HC OXYGEN, UP TO 24 HOURS

## 2021-12-22 RX ORDER — FUROSEMIDE 80 MG/1
80 TABLET ORAL 2 TIMES DAILY
Status: DISCONTINUED | OUTPATIENT
Start: 2021-12-22 | End: 2021-12-29

## 2021-12-22 RX ADMIN — METOPROLOL TARTRATE 25 MG: 25 TABLET, FILM COATED ORAL at 09:12

## 2021-12-22 RX ADMIN — ANASTROZOLE 1 MG: 1 TABLET, COATED ORAL at 09:12

## 2021-12-22 RX ADMIN — APIXABAN 2.5 MG: 2.5 TABLET, FILM COATED ORAL at 09:12

## 2021-12-22 RX ADMIN — HYDROCODONE BITARTRATE AND ACETAMINOPHEN 1 TABLET: 10; 325 TABLET ORAL at 02:12

## 2021-12-22 RX ADMIN — METOPROLOL TARTRATE 25 MG: 25 TABLET, FILM COATED ORAL at 08:12

## 2021-12-22 RX ADMIN — FAMOTIDINE 20 MG: 20 TABLET ORAL at 09:12

## 2021-12-22 RX ADMIN — FUROSEMIDE 80 MG: 80 TABLET ORAL at 06:12

## 2021-12-22 RX ADMIN — IPRATROPIUM BROMIDE AND ALBUTEROL SULFATE 3 ML: .5; 3 SOLUTION RESPIRATORY (INHALATION) at 07:12

## 2021-12-22 RX ADMIN — AMPICILLIN AND SULBACTAM 1.5 G: 1; .5 INJECTION, POWDER, FOR SOLUTION INTRAVENOUS at 06:12

## 2021-12-22 RX ADMIN — SODIUM CHLORIDE: 0.9 INJECTION, SOLUTION INTRAVENOUS at 02:12

## 2021-12-22 RX ADMIN — HEPARIN SODIUM 1000 UNITS: 1000 INJECTION, SOLUTION INTRAVENOUS; SUBCUTANEOUS at 04:12

## 2021-12-22 RX ADMIN — TETRACYCLINE HYDROCHLORIDE 500 MG: 500 CAPSULE ORAL at 09:12

## 2021-12-22 RX ADMIN — AMPICILLIN SODIUM AND SULBACTAM SODIUM 3 G: 2; 1 INJECTION, POWDER, FOR SOLUTION INTRAMUSCULAR; INTRAVENOUS at 06:12

## 2021-12-22 RX ADMIN — EPOETIN ALFA-EPBX 6130 UNITS: 4000 INJECTION, SOLUTION INTRAVENOUS; SUBCUTANEOUS at 04:12

## 2021-12-22 RX ADMIN — LEVOTHYROXINE SODIUM 50 MCG: 50 TABLET ORAL at 06:12

## 2021-12-22 RX ADMIN — FUROSEMIDE 80 MG: 80 TABLET ORAL at 09:12

## 2021-12-22 RX ADMIN — IPRATROPIUM BROMIDE AND ALBUTEROL SULFATE 3 ML: .5; 3 SOLUTION RESPIRATORY (INHALATION) at 08:12

## 2021-12-22 RX ADMIN — HYDROCODONE BITARTRATE AND ACETAMINOPHEN 1 TABLET: 10; 325 TABLET ORAL at 08:12

## 2021-12-22 RX ADMIN — ATORVASTATIN CALCIUM 80 MG: 20 TABLET, FILM COATED ORAL at 09:12

## 2021-12-23 ENCOUNTER — TELEPHONE (OUTPATIENT)
Dept: UROLOGY | Facility: CLINIC | Age: 71
End: 2021-12-23
Payer: MEDICAID

## 2021-12-23 ENCOUNTER — ANESTHESIA (OUTPATIENT)
Dept: SURGERY | Facility: HOSPITAL | Age: 71
DRG: 659 | End: 2021-12-23
Payer: MEDICARE

## 2021-12-23 DIAGNOSIS — A49.9 BACTERIAL UTI: Primary | ICD-10-CM

## 2021-12-23 DIAGNOSIS — N39.0 BACTERIAL UTI: Primary | ICD-10-CM

## 2021-12-23 PROBLEM — R31.0 GROSS HEMATURIA: Status: RESOLVED | Noted: 2021-12-20 | Resolved: 2021-12-23

## 2021-12-23 PROBLEM — Z71.89 COUNSELING REGARDING ADVANCED CARE PLANNING AND GOALS OF CARE: Status: RESOLVED | Noted: 2021-12-01 | Resolved: 2021-12-23

## 2021-12-23 LAB
ALBUMIN SERPL BCP-MCNC: 1.6 G/DL (ref 3.5–5.2)
ALDOST SERPL-MCNC: 20.3 NG/DL
ALDOST/RENIN PLAS-RTO: 13.5 RATIO
ALP SERPL-CCNC: 162 U/L (ref 55–135)
ALT SERPL W/O P-5'-P-CCNC: 15 U/L (ref 10–44)
ANION GAP SERPL CALC-SCNC: 6 MMOL/L (ref 8–16)
AST SERPL-CCNC: 12 U/L (ref 10–40)
BACTERIA UR CULT: ABNORMAL
BASOPHILS # BLD AUTO: 0.03 K/UL (ref 0–0.2)
BASOPHILS NFR BLD: 0.5 % (ref 0–1.9)
BILIRUB SERPL-MCNC: 0.3 MG/DL (ref 0.1–1)
BUN SERPL-MCNC: 14 MG/DL (ref 8–23)
CALCIUM SERPL-MCNC: 7.9 MG/DL (ref 8.7–10.5)
CHLORIDE SERPL-SCNC: 99 MMOL/L (ref 95–110)
CO2 SERPL-SCNC: 28 MMOL/L (ref 23–29)
CREAT SERPL-MCNC: 1.5 MG/DL (ref 0.5–1.4)
DIFFERENTIAL METHOD: ABNORMAL
EOSINOPHIL # BLD AUTO: 0.1 K/UL (ref 0–0.5)
EOSINOPHIL NFR BLD: 2.3 % (ref 0–8)
ERYTHROCYTE [DISTWIDTH] IN BLOOD BY AUTOMATED COUNT: 15.8 % (ref 11.5–14.5)
EST. GFR  (AFRICAN AMERICAN): 40.7 ML/MIN/1.73 M^2
EST. GFR  (NON AFRICAN AMERICAN): 35.3 ML/MIN/1.73 M^2
GLUCOSE SERPL-MCNC: 113 MG/DL (ref 70–110)
HAV IGM SERPL QL IA: NEGATIVE
HBV CORE AB SERPL QL IA: NEGATIVE
HBV SURFACE AB SER-ACNC: NEGATIVE M[IU]/ML
HBV SURFACE AG SERPL QL IA: NEGATIVE
HCT VFR BLD AUTO: 25 % (ref 37–48.5)
HGB BLD-MCNC: 7.7 G/DL (ref 12–16)
IMM GRANULOCYTES # BLD AUTO: 0.1 K/UL (ref 0–0.04)
IMM GRANULOCYTES NFR BLD AUTO: 1.7 % (ref 0–0.5)
LYMPHOCYTES # BLD AUTO: 1.1 K/UL (ref 1–4.8)
LYMPHOCYTES NFR BLD: 19 % (ref 18–48)
MCH RBC QN AUTO: 28.9 PG (ref 27–31)
MCHC RBC AUTO-ENTMCNC: 30.8 G/DL (ref 32–36)
MCV RBC AUTO: 94 FL (ref 82–98)
MONOCYTES # BLD AUTO: 0.9 K/UL (ref 0.3–1)
MONOCYTES NFR BLD: 14.8 % (ref 4–15)
NEUTROPHILS # BLD AUTO: 3.7 K/UL (ref 1.8–7.7)
NEUTROPHILS NFR BLD: 61.7 % (ref 38–73)
NRBC BLD-RTO: 0 /100 WBC
PLATELET # BLD AUTO: 258 K/UL (ref 150–450)
PMV BLD AUTO: 8.7 FL (ref 9.2–12.9)
POCT GLUCOSE: 110 MG/DL (ref 70–110)
POCT GLUCOSE: 114 MG/DL (ref 70–110)
POCT GLUCOSE: 119 MG/DL (ref 70–110)
POCT GLUCOSE: 129 MG/DL (ref 70–110)
POCT GLUCOSE: 313 MG/DL (ref 70–110)
POTASSIUM SERPL-SCNC: 4 MMOL/L (ref 3.5–5.1)
PROT SERPL-MCNC: 4.3 G/DL (ref 6–8.4)
RBC # BLD AUTO: 2.66 M/UL (ref 4–5.4)
RENIN PLAS-CCNC: 1.5 NG/ML/HR
SODIUM SERPL-SCNC: 133 MMOL/L (ref 136–145)
TB INDURATION 48 - 72 HR READ: 0 MM
WBC # BLD AUTO: 6 K/UL (ref 3.9–12.7)

## 2021-12-23 PROCEDURE — C1769 GUIDE WIRE: HCPCS | Performed by: UROLOGY

## 2021-12-23 PROCEDURE — 99232 SBSQ HOSP IP/OBS MODERATE 35: CPT | Mod: ,,, | Performed by: STUDENT IN AN ORGANIZED HEALTH CARE EDUCATION/TRAINING PROGRAM

## 2021-12-23 PROCEDURE — 36000706: Performed by: UROLOGY

## 2021-12-23 PROCEDURE — 37000008 HC ANESTHESIA 1ST 15 MINUTES: Performed by: UROLOGY

## 2021-12-23 PROCEDURE — 94640 AIRWAY INHALATION TREATMENT: CPT

## 2021-12-23 PROCEDURE — 94799 UNLISTED PULMONARY SVC/PX: CPT

## 2021-12-23 PROCEDURE — 27000221 HC OXYGEN, UP TO 24 HOURS

## 2021-12-23 PROCEDURE — 63600175 PHARM REV CODE 636 W HCPCS: Performed by: HOSPITALIST

## 2021-12-23 PROCEDURE — 94761 N-INVAS EAR/PLS OXIMETRY MLT: CPT

## 2021-12-23 PROCEDURE — 99232 PR SUBSEQUENT HOSPITAL CARE,LEVL II: ICD-10-PCS | Mod: ,,, | Performed by: STUDENT IN AN ORGANIZED HEALTH CARE EDUCATION/TRAINING PROGRAM

## 2021-12-23 PROCEDURE — 71000015 HC POSTOP RECOV 1ST HR: Performed by: UROLOGY

## 2021-12-23 PROCEDURE — 85025 COMPLETE CBC W/AUTO DIFF WBC: CPT | Performed by: HOSPITALIST

## 2021-12-23 PROCEDURE — 80053 COMPREHEN METABOLIC PANEL: CPT | Performed by: PHYSICIAN ASSISTANT

## 2021-12-23 PROCEDURE — 52332 CYSTOSCOPY AND TREATMENT: CPT | Mod: LT,,, | Performed by: UROLOGY

## 2021-12-23 PROCEDURE — 25000242 PHARM REV CODE 250 ALT 637 W/ HCPCS: Performed by: EMERGENCY MEDICINE

## 2021-12-23 PROCEDURE — 25000003 PHARM REV CODE 250: Performed by: HOSPITALIST

## 2021-12-23 PROCEDURE — D9220A PRA ANESTHESIA: Mod: ANES,,, | Performed by: ANESTHESIOLOGY

## 2021-12-23 PROCEDURE — 25000003 PHARM REV CODE 250: Performed by: STUDENT IN AN ORGANIZED HEALTH CARE EDUCATION/TRAINING PROGRAM

## 2021-12-23 PROCEDURE — C1758 CATHETER, URETERAL: HCPCS | Performed by: UROLOGY

## 2021-12-23 PROCEDURE — 11000001 HC ACUTE MED/SURG PRIVATE ROOM

## 2021-12-23 PROCEDURE — 99900035 HC TECH TIME PER 15 MIN (STAT)

## 2021-12-23 PROCEDURE — D9220A PRA ANESTHESIA: ICD-10-PCS | Mod: ANES,,, | Performed by: ANESTHESIOLOGY

## 2021-12-23 PROCEDURE — D9220A PRA ANESTHESIA: Mod: CRNA,,, | Performed by: NURSE ANESTHETIST, CERTIFIED REGISTERED

## 2021-12-23 PROCEDURE — 25000003 PHARM REV CODE 250: Performed by: INTERNAL MEDICINE

## 2021-12-23 PROCEDURE — 63600175 PHARM REV CODE 636 W HCPCS: Performed by: NURSE ANESTHETIST, CERTIFIED REGISTERED

## 2021-12-23 PROCEDURE — C2617 STENT, NON-COR, TEM W/O DEL: HCPCS | Performed by: UROLOGY

## 2021-12-23 PROCEDURE — 25000003 PHARM REV CODE 250: Performed by: CLINICAL NURSE SPECIALIST

## 2021-12-23 PROCEDURE — 52332 PR CYSTOSCOPY,INSERT URETERAL STENT: ICD-10-PCS | Mod: LT,,, | Performed by: UROLOGY

## 2021-12-23 PROCEDURE — 71000044 HC DOSC ROUTINE RECOVERY FIRST HOUR: Performed by: UROLOGY

## 2021-12-23 PROCEDURE — 36000707: Performed by: UROLOGY

## 2021-12-23 PROCEDURE — 37000009 HC ANESTHESIA EA ADD 15 MINS: Performed by: UROLOGY

## 2021-12-23 PROCEDURE — 82962 GLUCOSE BLOOD TEST: CPT | Performed by: UROLOGY

## 2021-12-23 PROCEDURE — D9220A PRA ANESTHESIA: ICD-10-PCS | Mod: CRNA,,, | Performed by: NURSE ANESTHETIST, CERTIFIED REGISTERED

## 2021-12-23 PROCEDURE — 25000003 PHARM REV CODE 250: Performed by: NURSE ANESTHETIST, CERTIFIED REGISTERED

## 2021-12-23 DEVICE — STENT URETERAL UNIV 6FR 24CM
Type: IMPLANTABLE DEVICE | Site: URETER | Status: NON-FUNCTIONAL
Removed: 2022-02-18

## 2021-12-23 RX ORDER — FENTANYL CITRATE 50 UG/ML
25 INJECTION, SOLUTION INTRAMUSCULAR; INTRAVENOUS EVERY 5 MIN PRN
Status: DISCONTINUED | OUTPATIENT
Start: 2021-12-23 | End: 2021-12-23 | Stop reason: HOSPADM

## 2021-12-23 RX ORDER — GENTAMICIN SULFATE 40 MG/ML
INJECTION, SOLUTION INTRAMUSCULAR; INTRAVENOUS
Status: DISCONTINUED | OUTPATIENT
Start: 2021-12-23 | End: 2021-12-23

## 2021-12-23 RX ORDER — MIDAZOLAM HYDROCHLORIDE 1 MG/ML
INJECTION, SOLUTION INTRAMUSCULAR; INTRAVENOUS
Status: DISCONTINUED | OUTPATIENT
Start: 2021-12-23 | End: 2021-12-23

## 2021-12-23 RX ORDER — ONDANSETRON 2 MG/ML
INJECTION INTRAMUSCULAR; INTRAVENOUS
Status: DISCONTINUED | OUTPATIENT
Start: 2021-12-23 | End: 2021-12-23

## 2021-12-23 RX ORDER — PROPOFOL 10 MG/ML
VIAL (ML) INTRAVENOUS
Status: DISCONTINUED | OUTPATIENT
Start: 2021-12-23 | End: 2021-12-23

## 2021-12-23 RX ORDER — HEPARIN SODIUM 1000 [USP'U]/ML
1000 INJECTION, SOLUTION INTRAVENOUS; SUBCUTANEOUS
Status: DISCONTINUED | OUTPATIENT
Start: 2021-12-24 | End: 2021-12-27

## 2021-12-23 RX ORDER — ONDANSETRON 2 MG/ML
4 INJECTION INTRAMUSCULAR; INTRAVENOUS DAILY PRN
Status: DISCONTINUED | OUTPATIENT
Start: 2021-12-23 | End: 2021-12-23 | Stop reason: HOSPADM

## 2021-12-23 RX ORDER — SODIUM CHLORIDE 9 MG/ML
INJECTION, SOLUTION INTRAVENOUS ONCE
Status: COMPLETED | OUTPATIENT
Start: 2021-12-24 | End: 2021-12-24

## 2021-12-23 RX ORDER — SODIUM CHLORIDE 0.9 % (FLUSH) 0.9 %
3 SYRINGE (ML) INJECTION EVERY 30 MIN PRN
Status: DISCONTINUED | OUTPATIENT
Start: 2021-12-23 | End: 2021-12-23 | Stop reason: HOSPADM

## 2021-12-23 RX ORDER — FENTANYL CITRATE 50 UG/ML
INJECTION, SOLUTION INTRAMUSCULAR; INTRAVENOUS
Status: DISCONTINUED | OUTPATIENT
Start: 2021-12-23 | End: 2021-12-23

## 2021-12-23 RX ORDER — PHENYLEPHRINE HCL IN 0.9% NACL 1 MG/10 ML
SYRINGE (ML) INTRAVENOUS
Status: DISCONTINUED | OUTPATIENT
Start: 2021-12-23 | End: 2021-12-23

## 2021-12-23 RX ADMIN — IPRATROPIUM BROMIDE AND ALBUTEROL SULFATE 3 ML: .5; 3 SOLUTION RESPIRATORY (INHALATION) at 07:12

## 2021-12-23 RX ADMIN — AMPICILLIN SODIUM AND SULBACTAM SODIUM 3 G: 2; 1 INJECTION, POWDER, FOR SOLUTION INTRAMUSCULAR; INTRAVENOUS at 06:12

## 2021-12-23 RX ADMIN — LEVOTHYROXINE SODIUM 50 MCG: 50 TABLET ORAL at 06:12

## 2021-12-23 RX ADMIN — IPRATROPIUM BROMIDE AND ALBUTEROL SULFATE 3 ML: .5; 3 SOLUTION RESPIRATORY (INHALATION) at 02:12

## 2021-12-23 RX ADMIN — MIDAZOLAM 2 MG: 1 INJECTION INTRAMUSCULAR; INTRAVENOUS at 08:12

## 2021-12-23 RX ADMIN — ANASTROZOLE 1 MG: 1 TABLET, COATED ORAL at 11:12

## 2021-12-23 RX ADMIN — INSULIN ASPART 8 UNITS: 100 INJECTION, SOLUTION INTRAVENOUS; SUBCUTANEOUS at 06:12

## 2021-12-23 RX ADMIN — HYDROCODONE BITARTRATE AND ACETAMINOPHEN 1 TABLET: 10; 325 TABLET ORAL at 06:12

## 2021-12-23 RX ADMIN — FENTANYL CITRATE 25 MCG: 50 INJECTION INTRAMUSCULAR; INTRAVENOUS at 09:12

## 2021-12-23 RX ADMIN — ATORVASTATIN CALCIUM 80 MG: 20 TABLET, FILM COATED ORAL at 11:12

## 2021-12-23 RX ADMIN — ONDANSETRON 4 MG: 2 INJECTION INTRAMUSCULAR; INTRAVENOUS at 09:12

## 2021-12-23 RX ADMIN — METOPROLOL TARTRATE 25 MG: 25 TABLET, FILM COATED ORAL at 11:12

## 2021-12-23 RX ADMIN — FAMOTIDINE 20 MG: 20 TABLET ORAL at 11:12

## 2021-12-23 RX ADMIN — Medication 100 MCG: at 09:12

## 2021-12-23 RX ADMIN — FUROSEMIDE 80 MG: 80 TABLET ORAL at 11:12

## 2021-12-23 RX ADMIN — SODIUM CHLORIDE: 0.9 INJECTION, SOLUTION INTRAVENOUS at 08:12

## 2021-12-23 RX ADMIN — Medication 20 MG: at 09:12

## 2021-12-23 RX ADMIN — FENTANYL CITRATE 50 MCG: 50 INJECTION INTRAMUSCULAR; INTRAVENOUS at 08:12

## 2021-12-23 RX ADMIN — GENTAMICIN SULFATE 240 MG: 40 INJECTION, SOLUTION INTRAMUSCULAR; INTRAVENOUS at 08:12

## 2021-12-23 RX ADMIN — PROPOFOL 50 MCG/KG/MIN: 10 INJECTION, EMULSION INTRAVENOUS at 08:12

## 2021-12-23 RX ADMIN — FUROSEMIDE 80 MG: 80 TABLET ORAL at 06:12

## 2021-12-24 LAB
ABO + RH BLD: NORMAL
ALBUMIN SERPL BCP-MCNC: 1.5 G/DL (ref 3.5–5.2)
ALP SERPL-CCNC: 146 U/L (ref 55–135)
ALT SERPL W/O P-5'-P-CCNC: 12 U/L (ref 10–44)
ANION GAP SERPL CALC-SCNC: 6 MMOL/L (ref 8–16)
AST SERPL-CCNC: 11 U/L (ref 10–40)
BASOPHILS # BLD AUTO: 0.03 K/UL (ref 0–0.2)
BASOPHILS NFR BLD: 0.5 % (ref 0–1.9)
BILIRUB SERPL-MCNC: 0.2 MG/DL (ref 0.1–1)
BLD GP AB SCN CELLS X3 SERPL QL: NORMAL
BLD PROD TYP BPU: NORMAL
BLOOD UNIT EXPIRATION DATE: NORMAL
BLOOD UNIT TYPE CODE: 6200
BLOOD UNIT TYPE: NORMAL
BUN SERPL-MCNC: 20 MG/DL (ref 8–23)
CALCIUM SERPL-MCNC: 7.8 MG/DL (ref 8.7–10.5)
CHLORIDE SERPL-SCNC: 100 MMOL/L (ref 95–110)
CO2 SERPL-SCNC: 28 MMOL/L (ref 23–29)
CODING SYSTEM: NORMAL
CREAT SERPL-MCNC: 1.9 MG/DL (ref 0.5–1.4)
DIFFERENTIAL METHOD: ABNORMAL
DISPENSE STATUS: NORMAL
EOSINOPHIL # BLD AUTO: 0.2 K/UL (ref 0–0.5)
EOSINOPHIL NFR BLD: 2.4 % (ref 0–8)
ERYTHROCYTE [DISTWIDTH] IN BLOOD BY AUTOMATED COUNT: 15.9 % (ref 11.5–14.5)
EST. GFR  (AFRICAN AMERICAN): 30.6 ML/MIN/1.73 M^2
EST. GFR  (NON AFRICAN AMERICAN): 26.5 ML/MIN/1.73 M^2
GLUCOSE SERPL-MCNC: 97 MG/DL (ref 70–110)
HCT VFR BLD AUTO: 22.1 % (ref 37–48.5)
HGB BLD-MCNC: 6.9 G/DL (ref 12–16)
IMM GRANULOCYTES # BLD AUTO: 0.1 K/UL (ref 0–0.04)
IMM GRANULOCYTES NFR BLD AUTO: 1.6 % (ref 0–0.5)
LYMPHOCYTES # BLD AUTO: 1.4 K/UL (ref 1–4.8)
LYMPHOCYTES NFR BLD: 22.7 % (ref 18–48)
MCH RBC QN AUTO: 29.7 PG (ref 27–31)
MCHC RBC AUTO-ENTMCNC: 31.2 G/DL (ref 32–36)
MCV RBC AUTO: 95 FL (ref 82–98)
MONOCYTES # BLD AUTO: 1 K/UL (ref 0.3–1)
MONOCYTES NFR BLD: 15.4 % (ref 4–15)
NEUTROPHILS # BLD AUTO: 3.6 K/UL (ref 1.8–7.7)
NEUTROPHILS NFR BLD: 57.4 % (ref 38–73)
NRBC BLD-RTO: 0 /100 WBC
PLATELET # BLD AUTO: 245 K/UL (ref 150–450)
PMV BLD AUTO: 8.7 FL (ref 9.2–12.9)
POCT GLUCOSE: 130 MG/DL (ref 70–110)
POCT GLUCOSE: 175 MG/DL (ref 70–110)
POCT GLUCOSE: 197 MG/DL (ref 70–110)
POTASSIUM SERPL-SCNC: 4.2 MMOL/L (ref 3.5–5.1)
PROT SERPL-MCNC: 4 G/DL (ref 6–8.4)
RBC # BLD AUTO: 2.32 M/UL (ref 4–5.4)
SODIUM SERPL-SCNC: 134 MMOL/L (ref 136–145)
TRANS ERYTHROCYTES VOL PATIENT: NORMAL ML
WBC # BLD AUTO: 6.3 K/UL (ref 3.9–12.7)

## 2021-12-24 PROCEDURE — P9021 RED BLOOD CELLS UNIT: HCPCS | Performed by: STUDENT IN AN ORGANIZED HEALTH CARE EDUCATION/TRAINING PROGRAM

## 2021-12-24 PROCEDURE — 94640 AIRWAY INHALATION TREATMENT: CPT

## 2021-12-24 PROCEDURE — 63600175 PHARM REV CODE 636 W HCPCS: Performed by: HOSPITALIST

## 2021-12-24 PROCEDURE — 25000242 PHARM REV CODE 250 ALT 637 W/ HCPCS: Performed by: EMERGENCY MEDICINE

## 2021-12-24 PROCEDURE — 80053 COMPREHEN METABOLIC PANEL: CPT | Performed by: PHYSICIAN ASSISTANT

## 2021-12-24 PROCEDURE — 63600175 PHARM REV CODE 636 W HCPCS: Performed by: STUDENT IN AN ORGANIZED HEALTH CARE EDUCATION/TRAINING PROGRAM

## 2021-12-24 PROCEDURE — 86922 COMPATIBILITY TEST ANTIGLOB: CPT | Performed by: STUDENT IN AN ORGANIZED HEALTH CARE EDUCATION/TRAINING PROGRAM

## 2021-12-24 PROCEDURE — 99232 SBSQ HOSP IP/OBS MODERATE 35: CPT | Mod: ,,, | Performed by: STUDENT IN AN ORGANIZED HEALTH CARE EDUCATION/TRAINING PROGRAM

## 2021-12-24 PROCEDURE — 94761 N-INVAS EAR/PLS OXIMETRY MLT: CPT

## 2021-12-24 PROCEDURE — 25000003 PHARM REV CODE 250: Performed by: HOSPITALIST

## 2021-12-24 PROCEDURE — 90935 PR HEMODIALYSIS, ONE EVALUATION: ICD-10-PCS | Mod: GC,,, | Performed by: INTERNAL MEDICINE

## 2021-12-24 PROCEDURE — 90935 HEMODIALYSIS ONE EVALUATION: CPT | Mod: GC,,, | Performed by: INTERNAL MEDICINE

## 2021-12-24 PROCEDURE — 25000003 PHARM REV CODE 250: Performed by: STUDENT IN AN ORGANIZED HEALTH CARE EDUCATION/TRAINING PROGRAM

## 2021-12-24 PROCEDURE — 0004A HC IMMUNIZ ADMIN, SARS-COV-2 COVID-19 VACC, 30MCG/0.3ML, BOOSTER DOSE: CPT | Performed by: STUDENT IN AN ORGANIZED HEALTH CARE EDUCATION/TRAINING PROGRAM

## 2021-12-24 PROCEDURE — 99232 PR SUBSEQUENT HOSPITAL CARE,LEVL II: ICD-10-PCS | Mod: ,,, | Performed by: STUDENT IN AN ORGANIZED HEALTH CARE EDUCATION/TRAINING PROGRAM

## 2021-12-24 PROCEDURE — 99900035 HC TECH TIME PER 15 MIN (STAT)

## 2021-12-24 PROCEDURE — 11000001 HC ACUTE MED/SURG PRIVATE ROOM

## 2021-12-24 PROCEDURE — 91300 PHARM REV CODE 636 W HCPCS: CPT | Performed by: STUDENT IN AN ORGANIZED HEALTH CARE EDUCATION/TRAINING PROGRAM

## 2021-12-24 PROCEDURE — 25000003 PHARM REV CODE 250: Performed by: INTERNAL MEDICINE

## 2021-12-24 PROCEDURE — 86900 BLOOD TYPING SEROLOGIC ABO: CPT | Performed by: STUDENT IN AN ORGANIZED HEALTH CARE EDUCATION/TRAINING PROGRAM

## 2021-12-24 PROCEDURE — 94799 UNLISTED PULMONARY SVC/PX: CPT

## 2021-12-24 PROCEDURE — 90935 HEMODIALYSIS ONE EVALUATION: CPT

## 2021-12-24 PROCEDURE — 85025 COMPLETE CBC W/AUTO DIFF WBC: CPT | Performed by: HOSPITALIST

## 2021-12-24 RX ORDER — HYDROCODONE BITARTRATE AND ACETAMINOPHEN 500; 5 MG/1; MG/1
TABLET ORAL
Status: DISCONTINUED | OUTPATIENT
Start: 2021-12-24 | End: 2021-12-30 | Stop reason: HOSPADM

## 2021-12-24 RX ADMIN — LEVOTHYROXINE SODIUM 50 MCG: 50 TABLET ORAL at 06:12

## 2021-12-24 RX ADMIN — AMPICILLIN SODIUM AND SULBACTAM SODIUM 3 G: 2; 1 INJECTION, POWDER, FOR SOLUTION INTRAMUSCULAR; INTRAVENOUS at 05:12

## 2021-12-24 RX ADMIN — HYDROCODONE BITARTRATE AND ACETAMINOPHEN 1 TABLET: 10; 325 TABLET ORAL at 02:12

## 2021-12-24 RX ADMIN — FUROSEMIDE 80 MG: 80 TABLET ORAL at 04:12

## 2021-12-24 RX ADMIN — HEPARIN SODIUM 1000 UNITS: 1000 INJECTION, SOLUTION INTRAVENOUS; SUBCUTANEOUS at 10:12

## 2021-12-24 RX ADMIN — AMPICILLIN SODIUM AND SULBACTAM SODIUM 3 G: 2; 1 INJECTION, POWDER, FOR SOLUTION INTRAMUSCULAR; INTRAVENOUS at 06:12

## 2021-12-24 RX ADMIN — IPRATROPIUM BROMIDE AND ALBUTEROL SULFATE 3 ML: .5; 3 SOLUTION RESPIRATORY (INHALATION) at 09:12

## 2021-12-24 RX ADMIN — FUROSEMIDE 80 MG: 80 TABLET ORAL at 06:12

## 2021-12-24 RX ADMIN — HYDROCODONE BITARTRATE AND ACETAMINOPHEN 1 TABLET: 10; 325 TABLET ORAL at 08:12

## 2021-12-24 RX ADMIN — METOPROLOL TARTRATE 25 MG: 25 TABLET, FILM COATED ORAL at 08:12

## 2021-12-24 RX ADMIN — EPOETIN ALFA-EPBX 6130 UNITS: 4000 INJECTION, SOLUTION INTRAVENOUS; SUBCUTANEOUS at 10:12

## 2021-12-24 RX ADMIN — IPRATROPIUM BROMIDE AND ALBUTEROL SULFATE 3 ML: .5; 3 SOLUTION RESPIRATORY (INHALATION) at 02:12

## 2021-12-24 RX ADMIN — RNA INGREDIENT BNT-162B2 0.3 ML: 0.23 INJECTION, SUSPENSION INTRAMUSCULAR at 04:12

## 2021-12-24 RX ADMIN — SODIUM CHLORIDE: 0.9 INJECTION, SOLUTION INTRAVENOUS at 08:12

## 2021-12-24 RX ADMIN — APIXABAN 2.5 MG: 2.5 TABLET, FILM COATED ORAL at 08:12

## 2021-12-25 LAB
ALBUMIN SERPL BCP-MCNC: 1.6 G/DL (ref 3.5–5.2)
ALP SERPL-CCNC: 149 U/L (ref 55–135)
ALT SERPL W/O P-5'-P-CCNC: 12 U/L (ref 10–44)
ANION GAP SERPL CALC-SCNC: 6 MMOL/L (ref 8–16)
AST SERPL-CCNC: 9 U/L (ref 10–40)
BASOPHILS # BLD AUTO: 0.04 K/UL (ref 0–0.2)
BASOPHILS NFR BLD: 0.6 % (ref 0–1.9)
BILIRUB SERPL-MCNC: 0.5 MG/DL (ref 0.1–1)
BUN SERPL-MCNC: 20 MG/DL (ref 8–23)
CALCIUM SERPL-MCNC: 7.7 MG/DL (ref 8.7–10.5)
CHLORIDE SERPL-SCNC: 97 MMOL/L (ref 95–110)
CO2 SERPL-SCNC: 28 MMOL/L (ref 23–29)
CREAT SERPL-MCNC: 2.2 MG/DL (ref 0.5–1.4)
DIFFERENTIAL METHOD: ABNORMAL
EOSINOPHIL # BLD AUTO: 0.2 K/UL (ref 0–0.5)
EOSINOPHIL NFR BLD: 2.3 % (ref 0–8)
ERYTHROCYTE [DISTWIDTH] IN BLOOD BY AUTOMATED COUNT: 15.8 % (ref 11.5–14.5)
EST. GFR  (AFRICAN AMERICAN): 25.6 ML/MIN/1.73 M^2
EST. GFR  (NON AFRICAN AMERICAN): 22.2 ML/MIN/1.73 M^2
GLUCOSE SERPL-MCNC: 166 MG/DL (ref 70–110)
HCT VFR BLD AUTO: 25.4 % (ref 37–48.5)
HGB BLD-MCNC: 7.8 G/DL (ref 12–16)
IMM GRANULOCYTES # BLD AUTO: 0.25 K/UL (ref 0–0.04)
IMM GRANULOCYTES NFR BLD AUTO: 3.6 % (ref 0–0.5)
LYMPHOCYTES # BLD AUTO: 1.4 K/UL (ref 1–4.8)
LYMPHOCYTES NFR BLD: 19.7 % (ref 18–48)
MCH RBC QN AUTO: 29.4 PG (ref 27–31)
MCHC RBC AUTO-ENTMCNC: 30.7 G/DL (ref 32–36)
MCV RBC AUTO: 96 FL (ref 82–98)
MONOCYTES # BLD AUTO: 1.1 K/UL (ref 0.3–1)
MONOCYTES NFR BLD: 15.9 % (ref 4–15)
NEUTROPHILS # BLD AUTO: 4 K/UL (ref 1.8–7.7)
NEUTROPHILS NFR BLD: 57.9 % (ref 38–73)
NRBC BLD-RTO: 0 /100 WBC
PLATELET # BLD AUTO: 226 K/UL (ref 150–450)
PMV BLD AUTO: 8.7 FL (ref 9.2–12.9)
POCT GLUCOSE: 149 MG/DL (ref 70–110)
POCT GLUCOSE: 170 MG/DL (ref 70–110)
POCT GLUCOSE: 181 MG/DL (ref 70–110)
POTASSIUM SERPL-SCNC: 4.2 MMOL/L (ref 3.5–5.1)
PROT SERPL-MCNC: 4.4 G/DL (ref 6–8.4)
RBC # BLD AUTO: 2.65 M/UL (ref 4–5.4)
SODIUM SERPL-SCNC: 131 MMOL/L (ref 136–145)
WBC # BLD AUTO: 6.9 K/UL (ref 3.9–12.7)

## 2021-12-25 PROCEDURE — 25000242 PHARM REV CODE 250 ALT 637 W/ HCPCS: Performed by: EMERGENCY MEDICINE

## 2021-12-25 PROCEDURE — 25000003 PHARM REV CODE 250: Performed by: STUDENT IN AN ORGANIZED HEALTH CARE EDUCATION/TRAINING PROGRAM

## 2021-12-25 PROCEDURE — 25000003 PHARM REV CODE 250: Performed by: INTERNAL MEDICINE

## 2021-12-25 PROCEDURE — 25000003 PHARM REV CODE 250: Performed by: CLINICAL NURSE SPECIALIST

## 2021-12-25 PROCEDURE — 11000001 HC ACUTE MED/SURG PRIVATE ROOM

## 2021-12-25 PROCEDURE — 99232 SBSQ HOSP IP/OBS MODERATE 35: CPT | Mod: ,,, | Performed by: STUDENT IN AN ORGANIZED HEALTH CARE EDUCATION/TRAINING PROGRAM

## 2021-12-25 PROCEDURE — 94799 UNLISTED PULMONARY SVC/PX: CPT

## 2021-12-25 PROCEDURE — 63600175 PHARM REV CODE 636 W HCPCS: Performed by: HOSPITALIST

## 2021-12-25 PROCEDURE — 80053 COMPREHEN METABOLIC PANEL: CPT | Performed by: PHYSICIAN ASSISTANT

## 2021-12-25 PROCEDURE — 94640 AIRWAY INHALATION TREATMENT: CPT

## 2021-12-25 PROCEDURE — 25000003 PHARM REV CODE 250: Performed by: HOSPITALIST

## 2021-12-25 PROCEDURE — 99232 PR SUBSEQUENT HOSPITAL CARE,LEVL II: ICD-10-PCS | Mod: ,,, | Performed by: STUDENT IN AN ORGANIZED HEALTH CARE EDUCATION/TRAINING PROGRAM

## 2021-12-25 PROCEDURE — 85025 COMPLETE CBC W/AUTO DIFF WBC: CPT | Performed by: HOSPITALIST

## 2021-12-25 PROCEDURE — 94761 N-INVAS EAR/PLS OXIMETRY MLT: CPT

## 2021-12-25 RX ADMIN — ATORVASTATIN CALCIUM 80 MG: 20 TABLET, FILM COATED ORAL at 09:12

## 2021-12-25 RX ADMIN — HYDROCODONE BITARTRATE AND ACETAMINOPHEN 1 TABLET: 10; 325 TABLET ORAL at 03:12

## 2021-12-25 RX ADMIN — AMPICILLIN SODIUM AND SULBACTAM SODIUM 3 G: 2; 1 INJECTION, POWDER, FOR SOLUTION INTRAMUSCULAR; INTRAVENOUS at 04:12

## 2021-12-25 RX ADMIN — AMPICILLIN SODIUM AND SULBACTAM SODIUM 3 G: 2; 1 INJECTION, POWDER, FOR SOLUTION INTRAMUSCULAR; INTRAVENOUS at 06:12

## 2021-12-25 RX ADMIN — IPRATROPIUM BROMIDE AND ALBUTEROL SULFATE 3 ML: .5; 3 SOLUTION RESPIRATORY (INHALATION) at 01:12

## 2021-12-25 RX ADMIN — METOPROLOL TARTRATE 25 MG: 25 TABLET, FILM COATED ORAL at 09:12

## 2021-12-25 RX ADMIN — FUROSEMIDE 80 MG: 80 TABLET ORAL at 09:12

## 2021-12-25 RX ADMIN — APIXABAN 2.5 MG: 2.5 TABLET, FILM COATED ORAL at 09:12

## 2021-12-25 RX ADMIN — METOPROLOL TARTRATE 25 MG: 25 TABLET, FILM COATED ORAL at 08:12

## 2021-12-25 RX ADMIN — APIXABAN 2.5 MG: 2.5 TABLET, FILM COATED ORAL at 08:12

## 2021-12-25 RX ADMIN — INSULIN ASPART 2 UNITS: 100 INJECTION, SOLUTION INTRAVENOUS; SUBCUTANEOUS at 04:12

## 2021-12-25 RX ADMIN — IPRATROPIUM BROMIDE AND ALBUTEROL SULFATE 3 ML: .5; 3 SOLUTION RESPIRATORY (INHALATION) at 09:12

## 2021-12-25 RX ADMIN — ANASTROZOLE 1 MG: 1 TABLET, COATED ORAL at 09:12

## 2021-12-25 RX ADMIN — IPRATROPIUM BROMIDE AND ALBUTEROL SULFATE 3 ML: .5; 3 SOLUTION RESPIRATORY (INHALATION) at 08:12

## 2021-12-25 RX ADMIN — FAMOTIDINE 20 MG: 20 TABLET ORAL at 09:12

## 2021-12-25 RX ADMIN — FUROSEMIDE 80 MG: 80 TABLET ORAL at 04:12

## 2021-12-25 RX ADMIN — HYDROCODONE BITARTRATE AND ACETAMINOPHEN 1 TABLET: 10; 325 TABLET ORAL at 04:12

## 2021-12-25 RX ADMIN — LEVOTHYROXINE SODIUM 50 MCG: 50 TABLET ORAL at 06:12

## 2021-12-26 LAB
ALBUMIN SERPL BCP-MCNC: 1.6 G/DL (ref 3.5–5.2)
ALP SERPL-CCNC: 141 U/L (ref 55–135)
ALT SERPL W/O P-5'-P-CCNC: 10 U/L (ref 10–44)
ANION GAP SERPL CALC-SCNC: 6 MMOL/L (ref 8–16)
AST SERPL-CCNC: 9 U/L (ref 10–40)
BASOPHILS # BLD AUTO: 0.04 K/UL (ref 0–0.2)
BASOPHILS NFR BLD: 0.5 % (ref 0–1.9)
BILIRUB SERPL-MCNC: 0.3 MG/DL (ref 0.1–1)
BUN SERPL-MCNC: 26 MG/DL (ref 8–23)
CALCIUM SERPL-MCNC: 7.7 MG/DL (ref 8.7–10.5)
CHLORIDE SERPL-SCNC: 97 MMOL/L (ref 95–110)
CO2 SERPL-SCNC: 28 MMOL/L (ref 23–29)
CREAT SERPL-MCNC: 2.3 MG/DL (ref 0.5–1.4)
DIFFERENTIAL METHOD: ABNORMAL
EOSINOPHIL # BLD AUTO: 0.2 K/UL (ref 0–0.5)
EOSINOPHIL NFR BLD: 2.6 % (ref 0–8)
ERYTHROCYTE [DISTWIDTH] IN BLOOD BY AUTOMATED COUNT: 15.3 % (ref 11.5–14.5)
EST. GFR  (AFRICAN AMERICAN): 24.3 ML/MIN/1.73 M^2
EST. GFR  (NON AFRICAN AMERICAN): 21.1 ML/MIN/1.73 M^2
GLUCOSE SERPL-MCNC: 111 MG/DL (ref 70–110)
HCT VFR BLD AUTO: 24.1 % (ref 37–48.5)
HGB BLD-MCNC: 7.4 G/DL (ref 12–16)
IMM GRANULOCYTES # BLD AUTO: 0.33 K/UL (ref 0–0.04)
IMM GRANULOCYTES NFR BLD AUTO: 4.4 % (ref 0–0.5)
LYMPHOCYTES # BLD AUTO: 1.4 K/UL (ref 1–4.8)
LYMPHOCYTES NFR BLD: 18.2 % (ref 18–48)
MCH RBC QN AUTO: 28.9 PG (ref 27–31)
MCHC RBC AUTO-ENTMCNC: 30.7 G/DL (ref 32–36)
MCV RBC AUTO: 94 FL (ref 82–98)
MONOCYTES # BLD AUTO: 1.1 K/UL (ref 0.3–1)
MONOCYTES NFR BLD: 14.2 % (ref 4–15)
NEUTROPHILS # BLD AUTO: 4.5 K/UL (ref 1.8–7.7)
NEUTROPHILS NFR BLD: 60.1 % (ref 38–73)
NRBC BLD-RTO: 0 /100 WBC
PLATELET # BLD AUTO: 226 K/UL (ref 150–450)
PMV BLD AUTO: 8.8 FL (ref 9.2–12.9)
POCT GLUCOSE: 127 MG/DL (ref 70–110)
POCT GLUCOSE: 187 MG/DL (ref 70–110)
POCT GLUCOSE: 209 MG/DL (ref 70–110)
POCT GLUCOSE: 217 MG/DL (ref 70–110)
POTASSIUM SERPL-SCNC: 4.4 MMOL/L (ref 3.5–5.1)
PROT SERPL-MCNC: 4.3 G/DL (ref 6–8.4)
RBC # BLD AUTO: 2.56 M/UL (ref 4–5.4)
SODIUM SERPL-SCNC: 131 MMOL/L (ref 136–145)
WBC # BLD AUTO: 7.42 K/UL (ref 3.9–12.7)

## 2021-12-26 PROCEDURE — 99232 SBSQ HOSP IP/OBS MODERATE 35: CPT | Mod: ,,, | Performed by: STUDENT IN AN ORGANIZED HEALTH CARE EDUCATION/TRAINING PROGRAM

## 2021-12-26 PROCEDURE — 25000003 PHARM REV CODE 250: Performed by: STUDENT IN AN ORGANIZED HEALTH CARE EDUCATION/TRAINING PROGRAM

## 2021-12-26 PROCEDURE — 85025 COMPLETE CBC W/AUTO DIFF WBC: CPT | Performed by: HOSPITALIST

## 2021-12-26 PROCEDURE — 94761 N-INVAS EAR/PLS OXIMETRY MLT: CPT

## 2021-12-26 PROCEDURE — 25000003 PHARM REV CODE 250: Performed by: INTERNAL MEDICINE

## 2021-12-26 PROCEDURE — 11000001 HC ACUTE MED/SURG PRIVATE ROOM

## 2021-12-26 PROCEDURE — 25000242 PHARM REV CODE 250 ALT 637 W/ HCPCS: Performed by: EMERGENCY MEDICINE

## 2021-12-26 PROCEDURE — 25000003 PHARM REV CODE 250: Performed by: HOSPITALIST

## 2021-12-26 PROCEDURE — 99900035 HC TECH TIME PER 15 MIN (STAT)

## 2021-12-26 PROCEDURE — 94640 AIRWAY INHALATION TREATMENT: CPT

## 2021-12-26 PROCEDURE — 99232 PR SUBSEQUENT HOSPITAL CARE,LEVL II: ICD-10-PCS | Mod: ,,, | Performed by: STUDENT IN AN ORGANIZED HEALTH CARE EDUCATION/TRAINING PROGRAM

## 2021-12-26 PROCEDURE — 63600175 PHARM REV CODE 636 W HCPCS: Performed by: HOSPITALIST

## 2021-12-26 PROCEDURE — 25000003 PHARM REV CODE 250: Performed by: CLINICAL NURSE SPECIALIST

## 2021-12-26 PROCEDURE — 80053 COMPREHEN METABOLIC PANEL: CPT | Performed by: PHYSICIAN ASSISTANT

## 2021-12-26 RX ADMIN — METOPROLOL TARTRATE 25 MG: 25 TABLET, FILM COATED ORAL at 07:12

## 2021-12-26 RX ADMIN — HYDROCODONE BITARTRATE AND ACETAMINOPHEN 1 TABLET: 10; 325 TABLET ORAL at 07:12

## 2021-12-26 RX ADMIN — IPRATROPIUM BROMIDE AND ALBUTEROL SULFATE 3 ML: .5; 3 SOLUTION RESPIRATORY (INHALATION) at 03:12

## 2021-12-26 RX ADMIN — AMPICILLIN SODIUM AND SULBACTAM SODIUM 3 G: 2; 1 INJECTION, POWDER, FOR SOLUTION INTRAMUSCULAR; INTRAVENOUS at 05:12

## 2021-12-26 RX ADMIN — ANASTROZOLE 1 MG: 1 TABLET, COATED ORAL at 07:12

## 2021-12-26 RX ADMIN — INSULIN ASPART 2 UNITS: 100 INJECTION, SOLUTION INTRAVENOUS; SUBCUTANEOUS at 12:12

## 2021-12-26 RX ADMIN — IPRATROPIUM BROMIDE AND ALBUTEROL SULFATE 3 ML: .5; 3 SOLUTION RESPIRATORY (INHALATION) at 10:12

## 2021-12-26 RX ADMIN — FUROSEMIDE 80 MG: 80 TABLET ORAL at 07:12

## 2021-12-26 RX ADMIN — LEVOTHYROXINE SODIUM 50 MCG: 50 TABLET ORAL at 05:12

## 2021-12-26 RX ADMIN — FUROSEMIDE 80 MG: 80 TABLET ORAL at 05:12

## 2021-12-26 RX ADMIN — APIXABAN 2.5 MG: 2.5 TABLET, FILM COATED ORAL at 07:12

## 2021-12-26 RX ADMIN — IPRATROPIUM BROMIDE AND ALBUTEROL SULFATE 3 ML: .5; 3 SOLUTION RESPIRATORY (INHALATION) at 07:12

## 2021-12-26 RX ADMIN — ATORVASTATIN CALCIUM 80 MG: 20 TABLET, FILM COATED ORAL at 07:12

## 2021-12-26 RX ADMIN — INSULIN ASPART 2 UNITS: 100 INJECTION, SOLUTION INTRAVENOUS; SUBCUTANEOUS at 10:12

## 2021-12-26 RX ADMIN — FAMOTIDINE 20 MG: 20 TABLET ORAL at 07:12

## 2021-12-26 RX ADMIN — METOPROLOL TARTRATE 25 MG: 25 TABLET, FILM COATED ORAL at 08:12

## 2021-12-26 RX ADMIN — HYDROCODONE BITARTRATE AND ACETAMINOPHEN 1 TABLET: 10; 325 TABLET ORAL at 08:12

## 2021-12-26 RX ADMIN — APIXABAN 2.5 MG: 2.5 TABLET, FILM COATED ORAL at 08:12

## 2021-12-27 ENCOUNTER — TELEPHONE (OUTPATIENT)
Dept: PREADMISSION TESTING | Facility: HOSPITAL | Age: 71
End: 2021-12-27
Payer: MEDICAID

## 2021-12-27 PROBLEM — K59.00 CONSTIPATION: Status: ACTIVE | Noted: 2021-12-27

## 2021-12-27 LAB
ALBUMIN SERPL BCP-MCNC: 1.6 G/DL (ref 3.5–5.2)
ALP SERPL-CCNC: 141 U/L (ref 55–135)
ALT SERPL W/O P-5'-P-CCNC: 10 U/L (ref 10–44)
ANION GAP SERPL CALC-SCNC: 7 MMOL/L (ref 8–16)
AST SERPL-CCNC: 8 U/L (ref 10–40)
BASOPHILS # BLD AUTO: 0.04 K/UL (ref 0–0.2)
BASOPHILS NFR BLD: 0.6 % (ref 0–1.9)
BILIRUB SERPL-MCNC: 0.2 MG/DL (ref 0.1–1)
BUN SERPL-MCNC: 33 MG/DL (ref 8–23)
CALCIUM SERPL-MCNC: 7.7 MG/DL (ref 8.7–10.5)
CHLORIDE SERPL-SCNC: 96 MMOL/L (ref 95–110)
CO2 SERPL-SCNC: 27 MMOL/L (ref 23–29)
CREAT SERPL-MCNC: 2.6 MG/DL (ref 0.5–1.4)
DIFFERENTIAL METHOD: ABNORMAL
EOSINOPHIL # BLD AUTO: 0.2 K/UL (ref 0–0.5)
EOSINOPHIL NFR BLD: 3.2 % (ref 0–8)
ERYTHROCYTE [DISTWIDTH] IN BLOOD BY AUTOMATED COUNT: 15.3 % (ref 11.5–14.5)
EST. GFR  (AFRICAN AMERICAN): 20.9 ML/MIN/1.73 M^2
EST. GFR  (NON AFRICAN AMERICAN): 18.2 ML/MIN/1.73 M^2
GLUCOSE SERPL-MCNC: 135 MG/DL (ref 70–110)
HCT VFR BLD AUTO: 23.5 % (ref 37–48.5)
HGB BLD-MCNC: 7.5 G/DL (ref 12–16)
IMM GRANULOCYTES # BLD AUTO: 0.29 K/UL (ref 0–0.04)
IMM GRANULOCYTES NFR BLD AUTO: 4 % (ref 0–0.5)
LYMPHOCYTES # BLD AUTO: 1.3 K/UL (ref 1–4.8)
LYMPHOCYTES NFR BLD: 17.5 % (ref 18–48)
MCH RBC QN AUTO: 29.8 PG (ref 27–31)
MCHC RBC AUTO-ENTMCNC: 31.9 G/DL (ref 32–36)
MCV RBC AUTO: 93 FL (ref 82–98)
MONOCYTES # BLD AUTO: 1 K/UL (ref 0.3–1)
MONOCYTES NFR BLD: 13.6 % (ref 4–15)
NEUTROPHILS # BLD AUTO: 4.4 K/UL (ref 1.8–7.7)
NEUTROPHILS NFR BLD: 61.1 % (ref 38–73)
NRBC BLD-RTO: 0 /100 WBC
PLATELET # BLD AUTO: 226 K/UL (ref 150–450)
PMV BLD AUTO: 8.8 FL (ref 9.2–12.9)
POCT GLUCOSE: 147 MG/DL (ref 70–110)
POCT GLUCOSE: 147 MG/DL (ref 70–110)
POCT GLUCOSE: 172 MG/DL (ref 70–110)
POCT GLUCOSE: 198 MG/DL (ref 70–110)
POCT GLUCOSE: 311 MG/DL (ref 70–110)
POCT GLUCOSE: 50 MG/DL (ref 70–110)
POTASSIUM SERPL-SCNC: 4.2 MMOL/L (ref 3.5–5.1)
PROT SERPL-MCNC: 4.4 G/DL (ref 6–8.4)
RBC # BLD AUTO: 2.52 M/UL (ref 4–5.4)
SODIUM SERPL-SCNC: 130 MMOL/L (ref 136–145)
WBC # BLD AUTO: 7.19 K/UL (ref 3.9–12.7)

## 2021-12-27 PROCEDURE — 25000242 PHARM REV CODE 250 ALT 637 W/ HCPCS: Performed by: EMERGENCY MEDICINE

## 2021-12-27 PROCEDURE — 25000003 PHARM REV CODE 250: Performed by: CLINICAL NURSE SPECIALIST

## 2021-12-27 PROCEDURE — 25000003 PHARM REV CODE 250: Performed by: STUDENT IN AN ORGANIZED HEALTH CARE EDUCATION/TRAINING PROGRAM

## 2021-12-27 PROCEDURE — 25000003 PHARM REV CODE 250: Performed by: HOSPITALIST

## 2021-12-27 PROCEDURE — 63600175 PHARM REV CODE 636 W HCPCS: Performed by: HOSPITALIST

## 2021-12-27 PROCEDURE — 90935 HEMODIALYSIS ONE EVALUATION: CPT

## 2021-12-27 PROCEDURE — 25000003 PHARM REV CODE 250: Performed by: NURSE PRACTITIONER

## 2021-12-27 PROCEDURE — 63600175 PHARM REV CODE 636 W HCPCS: Mod: JG | Performed by: STUDENT IN AN ORGANIZED HEALTH CARE EDUCATION/TRAINING PROGRAM

## 2021-12-27 PROCEDURE — 25000003 PHARM REV CODE 250: Performed by: INTERNAL MEDICINE

## 2021-12-27 PROCEDURE — 94761 N-INVAS EAR/PLS OXIMETRY MLT: CPT

## 2021-12-27 PROCEDURE — 80100016 HC MAINTENANCE HEMODIALYSIS

## 2021-12-27 PROCEDURE — 11000001 HC ACUTE MED/SURG PRIVATE ROOM

## 2021-12-27 PROCEDURE — 94640 AIRWAY INHALATION TREATMENT: CPT

## 2021-12-27 PROCEDURE — 25000003 PHARM REV CODE 250: Performed by: PHYSICIAN ASSISTANT

## 2021-12-27 PROCEDURE — 90945 PR DIALYSIS, NOT HEMO, 1 EVAL: ICD-10-PCS | Mod: ,,, | Performed by: INTERNAL MEDICINE

## 2021-12-27 PROCEDURE — 90945 DIALYSIS ONE EVALUATION: CPT | Mod: ,,, | Performed by: INTERNAL MEDICINE

## 2021-12-27 PROCEDURE — 99232 PR SUBSEQUENT HOSPITAL CARE,LEVL II: ICD-10-PCS | Mod: ,,, | Performed by: STUDENT IN AN ORGANIZED HEALTH CARE EDUCATION/TRAINING PROGRAM

## 2021-12-27 PROCEDURE — 94760 N-INVAS EAR/PLS OXIMETRY 1: CPT

## 2021-12-27 PROCEDURE — 80053 COMPREHEN METABOLIC PANEL: CPT | Performed by: PHYSICIAN ASSISTANT

## 2021-12-27 PROCEDURE — 85025 COMPLETE CBC W/AUTO DIFF WBC: CPT | Performed by: HOSPITALIST

## 2021-12-27 PROCEDURE — 99232 SBSQ HOSP IP/OBS MODERATE 35: CPT | Mod: ,,, | Performed by: STUDENT IN AN ORGANIZED HEALTH CARE EDUCATION/TRAINING PROGRAM

## 2021-12-27 RX ORDER — HEPARIN SODIUM 1000 [USP'U]/ML
1000 INJECTION, SOLUTION INTRAVENOUS; SUBCUTANEOUS
Status: DISCONTINUED | OUTPATIENT
Start: 2021-12-27 | End: 2021-12-30 | Stop reason: HOSPADM

## 2021-12-27 RX ORDER — POLYETHYLENE GLYCOL 3350 17 G/17G
17 POWDER, FOR SOLUTION ORAL DAILY
Status: DISCONTINUED | OUTPATIENT
Start: 2021-12-27 | End: 2021-12-29

## 2021-12-27 RX ORDER — SODIUM CHLORIDE 9 MG/ML
INJECTION, SOLUTION INTRAVENOUS ONCE
Status: COMPLETED | OUTPATIENT
Start: 2021-12-27 | End: 2021-12-27

## 2021-12-27 RX ORDER — CIPROFLOXACIN 500 MG/1
500 TABLET ORAL EVERY 24 HOURS
Status: DISCONTINUED | OUTPATIENT
Start: 2021-12-27 | End: 2021-12-30 | Stop reason: HOSPADM

## 2021-12-27 RX ADMIN — HEPARIN SODIUM 1000 UNITS: 1000 INJECTION, SOLUTION INTRAVENOUS; SUBCUTANEOUS at 12:12

## 2021-12-27 RX ADMIN — ATORVASTATIN CALCIUM 80 MG: 20 TABLET, FILM COATED ORAL at 02:12

## 2021-12-27 RX ADMIN — LEVOTHYROXINE SODIUM 50 MCG: 50 TABLET ORAL at 05:12

## 2021-12-27 RX ADMIN — HYDROCODONE BITARTRATE AND ACETAMINOPHEN 1 TABLET: 10; 325 TABLET ORAL at 02:12

## 2021-12-27 RX ADMIN — HYDROCODONE BITARTRATE AND ACETAMINOPHEN 1 TABLET: 10; 325 TABLET ORAL at 08:12

## 2021-12-27 RX ADMIN — APIXABAN 2.5 MG: 2.5 TABLET, FILM COATED ORAL at 02:12

## 2021-12-27 RX ADMIN — CIPROFLOXACIN 500 MG: 500 TABLET, FILM COATED ORAL at 02:12

## 2021-12-27 RX ADMIN — AMPICILLIN SODIUM AND SULBACTAM SODIUM 3 G: 2; 1 INJECTION, POWDER, FOR SOLUTION INTRAMUSCULAR; INTRAVENOUS at 05:12

## 2021-12-27 RX ADMIN — FUROSEMIDE 80 MG: 80 TABLET ORAL at 04:12

## 2021-12-27 RX ADMIN — APIXABAN 2.5 MG: 2.5 TABLET, FILM COATED ORAL at 08:12

## 2021-12-27 RX ADMIN — IPRATROPIUM BROMIDE AND ALBUTEROL SULFATE 3 ML: .5; 3 SOLUTION RESPIRATORY (INHALATION) at 03:12

## 2021-12-27 RX ADMIN — METOPROLOL TARTRATE 25 MG: 25 TABLET, FILM COATED ORAL at 02:12

## 2021-12-27 RX ADMIN — METOPROLOL TARTRATE 25 MG: 25 TABLET, FILM COATED ORAL at 08:12

## 2021-12-27 RX ADMIN — SODIUM CHLORIDE: 0.9 INJECTION, SOLUTION INTRAVENOUS at 09:12

## 2021-12-27 RX ADMIN — SENNOSIDES AND DOCUSATE SODIUM 1 TABLET: 50; 8.6 TABLET ORAL at 02:12

## 2021-12-27 RX ADMIN — IPRATROPIUM BROMIDE AND ALBUTEROL SULFATE 3 ML: .5; 3 SOLUTION RESPIRATORY (INHALATION) at 09:12

## 2021-12-27 RX ADMIN — Medication 6 MG: at 08:12

## 2021-12-27 RX ADMIN — POLYETHYLENE GLYCOL 3350 17 G: 17 POWDER, FOR SOLUTION ORAL at 08:12

## 2021-12-27 RX ADMIN — DEXTROSE MONOHYDRATE 12.5 G: 25 INJECTION, SOLUTION INTRAVENOUS at 11:12

## 2021-12-27 RX ADMIN — HYDROCODONE BITARTRATE AND ACETAMINOPHEN 1 TABLET: 10; 325 TABLET ORAL at 03:12

## 2021-12-27 RX ADMIN — EPOETIN ALFA-EPBX 6130 UNITS: 4000 INJECTION, SOLUTION INTRAVENOUS; SUBCUTANEOUS at 11:12

## 2021-12-27 RX ADMIN — FUROSEMIDE 80 MG: 80 TABLET ORAL at 02:12

## 2021-12-27 RX ADMIN — FAMOTIDINE 20 MG: 20 TABLET ORAL at 02:12

## 2021-12-27 RX ADMIN — SENNOSIDES AND DOCUSATE SODIUM 1 TABLET: 50; 8.6 TABLET ORAL at 04:12

## 2021-12-27 RX ADMIN — ANASTROZOLE 1 MG: 1 TABLET, COATED ORAL at 02:12

## 2021-12-27 NOTE — TELEPHONE ENCOUNTER
----- Message from Bridget Nelson LPN sent at 12/27/2021  7:58 AM CST -----  MD Ronel Admas MD  Cc: KENNEDI ALVARADO Staff; Grazyna Gillespie MA    Caller: Unspecified (4 days ago,  9:27 AM)  Thank you     Team, please schedule with any available provider

## 2021-12-28 LAB
ALBUMIN SERPL BCP-MCNC: 1.7 G/DL (ref 3.5–5.2)
ALP SERPL-CCNC: 149 U/L (ref 55–135)
ALT SERPL W/O P-5'-P-CCNC: 14 U/L (ref 10–44)
ANION GAP SERPL CALC-SCNC: 5 MMOL/L (ref 8–16)
AST SERPL-CCNC: 15 U/L (ref 10–40)
BASOPHILS # BLD AUTO: 0.06 K/UL (ref 0–0.2)
BASOPHILS NFR BLD: 0.7 % (ref 0–1.9)
BILIRUB SERPL-MCNC: 0.3 MG/DL (ref 0.1–1)
BUN SERPL-MCNC: 21 MG/DL (ref 8–23)
CALCIUM SERPL-MCNC: 8.2 MG/DL (ref 8.7–10.5)
CHLORIDE SERPL-SCNC: 97 MMOL/L (ref 95–110)
CO2 SERPL-SCNC: 28 MMOL/L (ref 23–29)
CREAT SERPL-MCNC: 1.9 MG/DL (ref 0.5–1.4)
DIFFERENTIAL METHOD: ABNORMAL
EOSINOPHIL # BLD AUTO: 0.2 K/UL (ref 0–0.5)
EOSINOPHIL NFR BLD: 2.3 % (ref 0–8)
ERYTHROCYTE [DISTWIDTH] IN BLOOD BY AUTOMATED COUNT: 15.7 % (ref 11.5–14.5)
EST. GFR  (AFRICAN AMERICAN): 30.6 ML/MIN/1.73 M^2
EST. GFR  (NON AFRICAN AMERICAN): 26.5 ML/MIN/1.73 M^2
GLUCOSE SERPL-MCNC: 165 MG/DL (ref 70–110)
HCT VFR BLD AUTO: 25.7 % (ref 37–48.5)
HGB BLD-MCNC: 7.9 G/DL (ref 12–16)
IMM GRANULOCYTES # BLD AUTO: 0.3 K/UL (ref 0–0.04)
IMM GRANULOCYTES NFR BLD AUTO: 3.5 % (ref 0–0.5)
LYMPHOCYTES # BLD AUTO: 1.4 K/UL (ref 1–4.8)
LYMPHOCYTES NFR BLD: 15.9 % (ref 18–48)
MCH RBC QN AUTO: 29.2 PG (ref 27–31)
MCHC RBC AUTO-ENTMCNC: 30.7 G/DL (ref 32–36)
MCV RBC AUTO: 95 FL (ref 82–98)
MONOCYTES # BLD AUTO: 1.3 K/UL (ref 0.3–1)
MONOCYTES NFR BLD: 15.1 % (ref 4–15)
NEUTROPHILS # BLD AUTO: 5.4 K/UL (ref 1.8–7.7)
NEUTROPHILS NFR BLD: 62.5 % (ref 38–73)
NRBC BLD-RTO: 0 /100 WBC
PLATELET # BLD AUTO: 252 K/UL (ref 150–450)
PMV BLD AUTO: 9 FL (ref 9.2–12.9)
POCT GLUCOSE: 166 MG/DL (ref 70–110)
POCT GLUCOSE: 180 MG/DL (ref 70–110)
POCT GLUCOSE: 220 MG/DL (ref 70–110)
POCT GLUCOSE: 271 MG/DL (ref 70–110)
POCT GLUCOSE: 301 MG/DL (ref 70–110)
POTASSIUM SERPL-SCNC: 3.9 MMOL/L (ref 3.5–5.1)
PROT SERPL-MCNC: 5.5 G/DL (ref 6–8.4)
PTH-INTACT SERPL-MCNC: 52.6 PG/ML (ref 9–77)
RBC # BLD AUTO: 2.71 M/UL (ref 4–5.4)
SARS-COV-2 RNA RESP QL NAA+PROBE: NOT DETECTED
SODIUM SERPL-SCNC: 130 MMOL/L (ref 136–145)
WBC # BLD AUTO: 8.66 K/UL (ref 3.9–12.7)

## 2021-12-28 PROCEDURE — 25000003 PHARM REV CODE 250: Performed by: HOSPITALIST

## 2021-12-28 PROCEDURE — 25000003 PHARM REV CODE 250: Performed by: STUDENT IN AN ORGANIZED HEALTH CARE EDUCATION/TRAINING PROGRAM

## 2021-12-28 PROCEDURE — 80053 COMPREHEN METABOLIC PANEL: CPT | Performed by: PHYSICIAN ASSISTANT

## 2021-12-28 PROCEDURE — 25000003 PHARM REV CODE 250: Performed by: CLINICAL NURSE SPECIALIST

## 2021-12-28 PROCEDURE — 94760 N-INVAS EAR/PLS OXIMETRY 1: CPT

## 2021-12-28 PROCEDURE — 99232 SBSQ HOSP IP/OBS MODERATE 35: CPT | Mod: ,,, | Performed by: STUDENT IN AN ORGANIZED HEALTH CARE EDUCATION/TRAINING PROGRAM

## 2021-12-28 PROCEDURE — 25000242 PHARM REV CODE 250 ALT 637 W/ HCPCS: Performed by: EMERGENCY MEDICINE

## 2021-12-28 PROCEDURE — 99232 PR SUBSEQUENT HOSPITAL CARE,LEVL II: ICD-10-PCS | Mod: ,,, | Performed by: STUDENT IN AN ORGANIZED HEALTH CARE EDUCATION/TRAINING PROGRAM

## 2021-12-28 PROCEDURE — 99232 PR SUBSEQUENT HOSPITAL CARE,LEVL II: ICD-10-PCS | Mod: ,,, | Performed by: NURSE PRACTITIONER

## 2021-12-28 PROCEDURE — U0005 INFEC AGEN DETEC AMPLI PROBE: HCPCS | Performed by: STUDENT IN AN ORGANIZED HEALTH CARE EDUCATION/TRAINING PROGRAM

## 2021-12-28 PROCEDURE — 11000001 HC ACUTE MED/SURG PRIVATE ROOM

## 2021-12-28 PROCEDURE — 27000221 HC OXYGEN, UP TO 24 HOURS

## 2021-12-28 PROCEDURE — 36415 COLL VENOUS BLD VENIPUNCTURE: CPT | Performed by: PHYSICIAN ASSISTANT

## 2021-12-28 PROCEDURE — 97530 THERAPEUTIC ACTIVITIES: CPT

## 2021-12-28 PROCEDURE — 97110 THERAPEUTIC EXERCISES: CPT

## 2021-12-28 PROCEDURE — 83970 ASSAY OF PARATHORMONE: CPT | Performed by: INTERNAL MEDICINE

## 2021-12-28 PROCEDURE — 63600175 PHARM REV CODE 636 W HCPCS: Performed by: NURSE PRACTITIONER

## 2021-12-28 PROCEDURE — 99900035 HC TECH TIME PER 15 MIN (STAT)

## 2021-12-28 PROCEDURE — 94761 N-INVAS EAR/PLS OXIMETRY MLT: CPT

## 2021-12-28 PROCEDURE — 99232 SBSQ HOSP IP/OBS MODERATE 35: CPT | Mod: ,,, | Performed by: NURSE PRACTITIONER

## 2021-12-28 PROCEDURE — 25000003 PHARM REV CODE 250: Performed by: PHYSICIAN ASSISTANT

## 2021-12-28 PROCEDURE — 94640 AIRWAY INHALATION TREATMENT: CPT

## 2021-12-28 PROCEDURE — 85025 COMPLETE CBC W/AUTO DIFF WBC: CPT | Performed by: HOSPITALIST

## 2021-12-28 PROCEDURE — U0003 INFECTIOUS AGENT DETECTION BY NUCLEIC ACID (DNA OR RNA); SEVERE ACUTE RESPIRATORY SYNDROME CORONAVIRUS 2 (SARS-COV-2) (CORONAVIRUS DISEASE [COVID-19]), AMPLIFIED PROBE TECHNIQUE, MAKING USE OF HIGH THROUGHPUT TECHNOLOGIES AS DESCRIBED BY CMS-2020-01-R: HCPCS | Performed by: STUDENT IN AN ORGANIZED HEALTH CARE EDUCATION/TRAINING PROGRAM

## 2021-12-28 PROCEDURE — 25000003 PHARM REV CODE 250: Performed by: INTERNAL MEDICINE

## 2021-12-28 RX ADMIN — HYDROCODONE BITARTRATE AND ACETAMINOPHEN 1 TABLET: 10; 325 TABLET ORAL at 08:12

## 2021-12-28 RX ADMIN — IPRATROPIUM BROMIDE AND ALBUTEROL SULFATE 3 ML: .5; 3 SOLUTION RESPIRATORY (INHALATION) at 09:12

## 2021-12-28 RX ADMIN — LEVOTHYROXINE SODIUM 50 MCG: 50 TABLET ORAL at 06:12

## 2021-12-28 RX ADMIN — FAMOTIDINE 20 MG: 20 TABLET ORAL at 09:12

## 2021-12-28 RX ADMIN — IPRATROPIUM BROMIDE AND ALBUTEROL SULFATE 3 ML: .5; 3 SOLUTION RESPIRATORY (INHALATION) at 01:12

## 2021-12-28 RX ADMIN — IPRATROPIUM BROMIDE AND ALBUTEROL SULFATE 3 ML: .5; 3 SOLUTION RESPIRATORY (INHALATION) at 07:12

## 2021-12-28 RX ADMIN — Medication 6 MG: at 08:12

## 2021-12-28 RX ADMIN — CIPROFLOXACIN 500 MG: 500 TABLET, FILM COATED ORAL at 03:12

## 2021-12-28 RX ADMIN — ATORVASTATIN CALCIUM 80 MG: 20 TABLET, FILM COATED ORAL at 09:12

## 2021-12-28 RX ADMIN — APIXABAN 2.5 MG: 2.5 TABLET, FILM COATED ORAL at 09:12

## 2021-12-28 RX ADMIN — INSULIN ASPART 2 UNITS: 100 INJECTION, SOLUTION INTRAVENOUS; SUBCUTANEOUS at 09:12

## 2021-12-28 RX ADMIN — ANASTROZOLE 1 MG: 1 TABLET, COATED ORAL at 09:12

## 2021-12-28 RX ADMIN — POLYETHYLENE GLYCOL 3350 17 G: 17 POWDER, FOR SOLUTION ORAL at 09:12

## 2021-12-28 RX ADMIN — METOPROLOL TARTRATE 25 MG: 25 TABLET, FILM COATED ORAL at 09:12

## 2021-12-28 RX ADMIN — HYDROCODONE BITARTRATE AND ACETAMINOPHEN 1 TABLET: 10; 325 TABLET ORAL at 09:12

## 2021-12-28 RX ADMIN — METOPROLOL TARTRATE 25 MG: 25 TABLET, FILM COATED ORAL at 08:12

## 2021-12-28 RX ADMIN — APIXABAN 2.5 MG: 2.5 TABLET, FILM COATED ORAL at 08:12

## 2021-12-28 RX ADMIN — FUROSEMIDE 80 MG: 80 TABLET ORAL at 09:12

## 2021-12-28 RX ADMIN — FUROSEMIDE 80 MG: 80 TABLET ORAL at 05:12

## 2021-12-28 RX ADMIN — INSULIN ASPART 1 UNITS: 100 INJECTION, SOLUTION INTRAVENOUS; SUBCUTANEOUS at 09:12

## 2021-12-28 RX ADMIN — INSULIN ASPART 8 UNITS: 100 INJECTION, SOLUTION INTRAVENOUS; SUBCUTANEOUS at 05:12

## 2021-12-29 ENCOUNTER — TELEPHONE (OUTPATIENT)
Dept: INTERNAL MEDICINE | Facility: CLINIC | Age: 71
End: 2021-12-29
Payer: MEDICAID

## 2021-12-29 ENCOUNTER — OUTPATIENT CASE MANAGEMENT (OUTPATIENT)
Dept: ADMINISTRATIVE | Facility: OTHER | Age: 71
End: 2021-12-29
Payer: MEDICAID

## 2021-12-29 LAB
ALBUMIN SERPL BCP-MCNC: 1.9 G/DL (ref 3.5–5.2)
ALP SERPL-CCNC: 159 U/L (ref 55–135)
ALT SERPL W/O P-5'-P-CCNC: 15 U/L (ref 10–44)
ANION GAP SERPL CALC-SCNC: 9 MMOL/L (ref 8–16)
AST SERPL-CCNC: 22 U/L (ref 10–40)
BASOPHILS # BLD AUTO: 0.07 K/UL (ref 0–0.2)
BASOPHILS NFR BLD: 1 % (ref 0–1.9)
BILIRUB SERPL-MCNC: 0.3 MG/DL (ref 0.1–1)
BUN SERPL-MCNC: 27 MG/DL (ref 8–23)
CALCIUM SERPL-MCNC: 8 MG/DL (ref 8.7–10.5)
CHLORIDE SERPL-SCNC: 98 MMOL/L (ref 95–110)
CO2 SERPL-SCNC: 23 MMOL/L (ref 23–29)
CREAT SERPL-MCNC: 2.1 MG/DL (ref 0.5–1.4)
DIFFERENTIAL METHOD: ABNORMAL
EOSINOPHIL # BLD AUTO: 0.2 K/UL (ref 0–0.5)
EOSINOPHIL NFR BLD: 2.4 % (ref 0–8)
ERYTHROCYTE [DISTWIDTH] IN BLOOD BY AUTOMATED COUNT: 15.8 % (ref 11.5–14.5)
EST. GFR  (AFRICAN AMERICAN): 27.1 ML/MIN/1.73 M^2
EST. GFR  (NON AFRICAN AMERICAN): 23.5 ML/MIN/1.73 M^2
GLUCOSE SERPL-MCNC: 125 MG/DL (ref 70–110)
HCT VFR BLD AUTO: 31.3 % (ref 37–48.5)
HGB BLD-MCNC: 9.4 G/DL (ref 12–16)
IMM GRANULOCYTES # BLD AUTO: 0.35 K/UL (ref 0–0.04)
IMM GRANULOCYTES NFR BLD AUTO: 4.9 % (ref 0–0.5)
LYMPHOCYTES # BLD AUTO: 1.4 K/UL (ref 1–4.8)
LYMPHOCYTES NFR BLD: 19.6 % (ref 18–48)
MCH RBC QN AUTO: 29.6 PG (ref 27–31)
MCHC RBC AUTO-ENTMCNC: 30 G/DL (ref 32–36)
MCV RBC AUTO: 98 FL (ref 82–98)
MONOCYTES # BLD AUTO: 1.3 K/UL (ref 0.3–1)
MONOCYTES NFR BLD: 17.8 % (ref 4–15)
NEUTROPHILS # BLD AUTO: 3.9 K/UL (ref 1.8–7.7)
NEUTROPHILS NFR BLD: 54.3 % (ref 38–73)
NRBC BLD-RTO: 0 /100 WBC
PLATELET # BLD AUTO: 238 K/UL (ref 150–450)
PMV BLD AUTO: 9.6 FL (ref 9.2–12.9)
POCT GLUCOSE: 135 MG/DL (ref 70–110)
POCT GLUCOSE: 154 MG/DL (ref 70–110)
POCT GLUCOSE: 173 MG/DL (ref 70–110)
POCT GLUCOSE: 197 MG/DL (ref 70–110)
POCT GLUCOSE: 252 MG/DL (ref 70–110)
POTASSIUM SERPL-SCNC: 5 MMOL/L (ref 3.5–5.1)
PROT SERPL-MCNC: 5.5 G/DL (ref 6–8.4)
RBC # BLD AUTO: 3.18 M/UL (ref 4–5.4)
SODIUM SERPL-SCNC: 130 MMOL/L (ref 136–145)
WBC # BLD AUTO: 7.13 K/UL (ref 3.9–12.7)

## 2021-12-29 PROCEDURE — 25000003 PHARM REV CODE 250: Performed by: HOSPITALIST

## 2021-12-29 PROCEDURE — 85025 COMPLETE CBC W/AUTO DIFF WBC: CPT | Performed by: HOSPITALIST

## 2021-12-29 PROCEDURE — 25000003 PHARM REV CODE 250: Performed by: STUDENT IN AN ORGANIZED HEALTH CARE EDUCATION/TRAINING PROGRAM

## 2021-12-29 PROCEDURE — 36415 COLL VENOUS BLD VENIPUNCTURE: CPT | Performed by: PHYSICIAN ASSISTANT

## 2021-12-29 PROCEDURE — 25000242 PHARM REV CODE 250 ALT 637 W/ HCPCS: Performed by: EMERGENCY MEDICINE

## 2021-12-29 PROCEDURE — 25000003 PHARM REV CODE 250: Performed by: CLINICAL NURSE SPECIALIST

## 2021-12-29 PROCEDURE — 94640 AIRWAY INHALATION TREATMENT: CPT

## 2021-12-29 PROCEDURE — 94761 N-INVAS EAR/PLS OXIMETRY MLT: CPT

## 2021-12-29 PROCEDURE — 99232 SBSQ HOSP IP/OBS MODERATE 35: CPT | Mod: ,,, | Performed by: STUDENT IN AN ORGANIZED HEALTH CARE EDUCATION/TRAINING PROGRAM

## 2021-12-29 PROCEDURE — 25000003 PHARM REV CODE 250: Performed by: PHYSICIAN ASSISTANT

## 2021-12-29 PROCEDURE — 99232 SBSQ HOSP IP/OBS MODERATE 35: CPT | Mod: ,,, | Performed by: NURSE PRACTITIONER

## 2021-12-29 PROCEDURE — 99232 PR SUBSEQUENT HOSPITAL CARE,LEVL II: ICD-10-PCS | Mod: ,,, | Performed by: NURSE PRACTITIONER

## 2021-12-29 PROCEDURE — 80053 COMPREHEN METABOLIC PANEL: CPT | Performed by: PHYSICIAN ASSISTANT

## 2021-12-29 PROCEDURE — 11000001 HC ACUTE MED/SURG PRIVATE ROOM

## 2021-12-29 PROCEDURE — 25000003 PHARM REV CODE 250: Performed by: INTERNAL MEDICINE

## 2021-12-29 PROCEDURE — 99232 PR SUBSEQUENT HOSPITAL CARE,LEVL II: ICD-10-PCS | Mod: ,,, | Performed by: STUDENT IN AN ORGANIZED HEALTH CARE EDUCATION/TRAINING PROGRAM

## 2021-12-29 PROCEDURE — 97535 SELF CARE MNGMENT TRAINING: CPT

## 2021-12-29 PROCEDURE — 97530 THERAPEUTIC ACTIVITIES: CPT

## 2021-12-29 RX ORDER — HYDROCODONE BITARTRATE AND ACETAMINOPHEN 10; 325 MG/1; MG/1
1 TABLET ORAL EVERY 6 HOURS PRN
Qty: 30 TABLET | Refills: 0 | Status: SHIPPED | OUTPATIENT
Start: 2021-12-29 | End: 2022-01-05

## 2021-12-29 RX ORDER — ATORVASTATIN CALCIUM 80 MG/1
80 TABLET, FILM COATED ORAL DAILY
Qty: 90 TABLET | Refills: 3 | Status: SHIPPED | OUTPATIENT
Start: 2021-12-30 | End: 2023-01-31

## 2021-12-29 RX ORDER — HYDROCODONE BITARTRATE AND ACETAMINOPHEN 10; 325 MG/1; MG/1
1 TABLET ORAL EVERY 6 HOURS PRN
Qty: 30 TABLET | Refills: 0 | Status: SHIPPED | OUTPATIENT
Start: 2021-12-29 | End: 2021-12-29

## 2021-12-29 RX ORDER — HYDROCODONE BITARTRATE AND ACETAMINOPHEN 10; 325 MG/1; MG/1
1 TABLET ORAL EVERY 6 HOURS PRN
Qty: 120 TABLET | Refills: 0 | Status: SHIPPED | OUTPATIENT
Start: 2021-12-29 | End: 2021-12-29 | Stop reason: HOSPADM

## 2021-12-29 RX ORDER — TALC
6 POWDER (GRAM) TOPICAL NIGHTLY PRN
Qty: 30 TABLET | Refills: 0 | Status: ON HOLD | OUTPATIENT
Start: 2021-12-29 | End: 2022-02-14

## 2021-12-29 RX ORDER — METOPROLOL TARTRATE 25 MG/1
25 TABLET, FILM COATED ORAL 2 TIMES DAILY
Qty: 60 TABLET | Refills: 11 | Status: SHIPPED | OUTPATIENT
Start: 2021-12-29 | End: 2022-07-21

## 2021-12-29 RX ORDER — FUROSEMIDE 80 MG/1
160 TABLET ORAL 2 TIMES DAILY
Qty: 120 TABLET | Refills: 0 | Status: SHIPPED | OUTPATIENT
Start: 2021-12-29 | End: 2023-05-31

## 2021-12-29 RX ORDER — FAMOTIDINE 20 MG/1
20 TABLET, FILM COATED ORAL DAILY
Qty: 30 TABLET | Refills: 11 | Status: SHIPPED | OUTPATIENT
Start: 2021-12-30 | End: 2022-07-05

## 2021-12-29 RX ORDER — FUROSEMIDE 80 MG/1
160 TABLET ORAL 2 TIMES DAILY
Status: DISCONTINUED | OUTPATIENT
Start: 2021-12-29 | End: 2021-12-30 | Stop reason: HOSPADM

## 2021-12-29 RX ORDER — CIPROFLOXACIN 500 MG/1
500 TABLET ORAL NIGHTLY
Qty: 3 TABLET | Refills: 0 | Status: SHIPPED | OUTPATIENT
Start: 2021-12-29 | End: 2022-01-01

## 2021-12-29 RX ORDER — POLYETHYLENE GLYCOL 3350 17 G/17G
17 POWDER, FOR SOLUTION ORAL 2 TIMES DAILY PRN
Status: DISCONTINUED | OUTPATIENT
Start: 2021-12-29 | End: 2021-12-30 | Stop reason: HOSPADM

## 2021-12-29 RX ORDER — CIPROFLOXACIN 500 MG/1
500 TABLET ORAL DAILY PRN
Qty: 3 TABLET | Refills: 0 | Status: SHIPPED | OUTPATIENT
Start: 2021-12-29 | End: 2021-12-29

## 2021-12-29 RX ORDER — ALBUTEROL SULFATE 90 UG/1
2 AEROSOL, METERED RESPIRATORY (INHALATION) EVERY 6 HOURS PRN
Qty: 8.5 G | Refills: 0 | Status: SHIPPED | OUTPATIENT
Start: 2021-12-29 | End: 2022-07-21

## 2021-12-29 RX ADMIN — IPRATROPIUM BROMIDE AND ALBUTEROL SULFATE 3 ML: .5; 3 SOLUTION RESPIRATORY (INHALATION) at 01:12

## 2021-12-29 RX ADMIN — CIPROFLOXACIN 500 MG: 500 TABLET, FILM COATED ORAL at 04:12

## 2021-12-29 RX ADMIN — ANASTROZOLE 1 MG: 1 TABLET, COATED ORAL at 08:12

## 2021-12-29 RX ADMIN — FUROSEMIDE 160 MG: 80 TABLET ORAL at 05:12

## 2021-12-29 RX ADMIN — Medication 6 MG: at 08:12

## 2021-12-29 RX ADMIN — HYDROCODONE BITARTRATE AND ACETAMINOPHEN 1 TABLET: 10; 325 TABLET ORAL at 08:12

## 2021-12-29 RX ADMIN — METOPROLOL TARTRATE 25 MG: 25 TABLET, FILM COATED ORAL at 08:12

## 2021-12-29 RX ADMIN — FAMOTIDINE 20 MG: 20 TABLET ORAL at 08:12

## 2021-12-29 RX ADMIN — IPRATROPIUM BROMIDE AND ALBUTEROL SULFATE 3 ML: .5; 3 SOLUTION RESPIRATORY (INHALATION) at 09:12

## 2021-12-29 RX ADMIN — IPRATROPIUM BROMIDE AND ALBUTEROL SULFATE 3 ML: .5; 3 SOLUTION RESPIRATORY (INHALATION) at 07:12

## 2021-12-29 RX ADMIN — FUROSEMIDE 160 MG: 80 TABLET ORAL at 08:12

## 2021-12-29 RX ADMIN — ATORVASTATIN CALCIUM 80 MG: 20 TABLET, FILM COATED ORAL at 08:12

## 2021-12-29 RX ADMIN — LEVOTHYROXINE SODIUM 50 MCG: 50 TABLET ORAL at 05:12

## 2021-12-29 RX ADMIN — INSULIN ASPART 6 UNITS: 100 INJECTION, SOLUTION INTRAVENOUS; SUBCUTANEOUS at 05:12

## 2021-12-30 VITALS
WEIGHT: 272.94 LBS | SYSTOLIC BLOOD PRESSURE: 147 MMHG | OXYGEN SATURATION: 96 % | HEIGHT: 65 IN | DIASTOLIC BLOOD PRESSURE: 65 MMHG | HEART RATE: 88 BPM | RESPIRATION RATE: 18 BRPM | TEMPERATURE: 99 F | BODY MASS INDEX: 45.47 KG/M2

## 2021-12-30 LAB
ALBUMIN SERPL BCP-MCNC: 1.7 G/DL (ref 3.5–5.2)
ALP SERPL-CCNC: 140 U/L (ref 55–135)
ALT SERPL W/O P-5'-P-CCNC: 12 U/L (ref 10–44)
ANION GAP SERPL CALC-SCNC: 8 MMOL/L (ref 8–16)
AST SERPL-CCNC: 14 U/L (ref 10–40)
BASOPHILS # BLD AUTO: 0.04 K/UL (ref 0–0.2)
BASOPHILS NFR BLD: 0.8 % (ref 0–1.9)
BILIRUB SERPL-MCNC: 0.3 MG/DL (ref 0.1–1)
BUN SERPL-MCNC: 32 MG/DL (ref 8–23)
CALCIUM SERPL-MCNC: 8.3 MG/DL (ref 8.7–10.5)
CHLORIDE SERPL-SCNC: 96 MMOL/L (ref 95–110)
CO2 SERPL-SCNC: 30 MMOL/L (ref 23–29)
CREAT SERPL-MCNC: 2.6 MG/DL (ref 0.5–1.4)
DIFFERENTIAL METHOD: ABNORMAL
EOSINOPHIL # BLD AUTO: 0.1 K/UL (ref 0–0.5)
EOSINOPHIL NFR BLD: 1.9 % (ref 0–8)
ERYTHROCYTE [DISTWIDTH] IN BLOOD BY AUTOMATED COUNT: 15.8 % (ref 11.5–14.5)
EST. GFR  (AFRICAN AMERICAN): 20.9 ML/MIN/1.73 M^2
EST. GFR  (NON AFRICAN AMERICAN): 18.2 ML/MIN/1.73 M^2
GLUCOSE SERPL-MCNC: 132 MG/DL (ref 70–110)
HCT VFR BLD AUTO: 25.9 % (ref 37–48.5)
HGB BLD-MCNC: 8.1 G/DL (ref 12–16)
IMM GRANULOCYTES # BLD AUTO: 0.25 K/UL (ref 0–0.04)
IMM GRANULOCYTES NFR BLD AUTO: 4.8 % (ref 0–0.5)
LYMPHOCYTES # BLD AUTO: 1 K/UL (ref 1–4.8)
LYMPHOCYTES NFR BLD: 19.6 % (ref 18–48)
MCH RBC QN AUTO: 29.6 PG (ref 27–31)
MCHC RBC AUTO-ENTMCNC: 31.3 G/DL (ref 32–36)
MCV RBC AUTO: 95 FL (ref 82–98)
MONOCYTES # BLD AUTO: 1.1 K/UL (ref 0.3–1)
MONOCYTES NFR BLD: 21.3 % (ref 4–15)
NEUTROPHILS # BLD AUTO: 2.7 K/UL (ref 1.8–7.7)
NEUTROPHILS NFR BLD: 51.6 % (ref 38–73)
NRBC BLD-RTO: 0 /100 WBC
PLATELET # BLD AUTO: 240 K/UL (ref 150–450)
PMV BLD AUTO: 9.5 FL (ref 9.2–12.9)
POCT GLUCOSE: 147 MG/DL (ref 70–110)
POCT GLUCOSE: 232 MG/DL (ref 70–110)
POTASSIUM SERPL-SCNC: 4.3 MMOL/L (ref 3.5–5.1)
PROT SERPL-MCNC: 5.3 G/DL (ref 6–8.4)
RBC # BLD AUTO: 2.74 M/UL (ref 4–5.4)
SODIUM SERPL-SCNC: 134 MMOL/L (ref 136–145)
WBC # BLD AUTO: 5.16 K/UL (ref 3.9–12.7)

## 2021-12-30 PROCEDURE — 99232 PR SUBSEQUENT HOSPITAL CARE,LEVL II: ICD-10-PCS | Mod: ,,, | Performed by: NURSE PRACTITIONER

## 2021-12-30 PROCEDURE — 94640 AIRWAY INHALATION TREATMENT: CPT

## 2021-12-30 PROCEDURE — 99232 SBSQ HOSP IP/OBS MODERATE 35: CPT | Mod: ,,, | Performed by: NURSE PRACTITIONER

## 2021-12-30 PROCEDURE — 25000003 PHARM REV CODE 250: Performed by: STUDENT IN AN ORGANIZED HEALTH CARE EDUCATION/TRAINING PROGRAM

## 2021-12-30 PROCEDURE — 25000242 PHARM REV CODE 250 ALT 637 W/ HCPCS: Performed by: EMERGENCY MEDICINE

## 2021-12-30 PROCEDURE — 80053 COMPREHEN METABOLIC PANEL: CPT | Performed by: PHYSICIAN ASSISTANT

## 2021-12-30 PROCEDURE — 25000003 PHARM REV CODE 250: Performed by: HOSPITALIST

## 2021-12-30 PROCEDURE — 85025 COMPLETE CBC W/AUTO DIFF WBC: CPT | Performed by: HOSPITALIST

## 2021-12-30 PROCEDURE — 25000003 PHARM REV CODE 250: Performed by: CLINICAL NURSE SPECIALIST

## 2021-12-30 PROCEDURE — 94761 N-INVAS EAR/PLS OXIMETRY MLT: CPT

## 2021-12-30 PROCEDURE — 25000003 PHARM REV CODE 250: Performed by: INTERNAL MEDICINE

## 2021-12-30 RX ADMIN — INSULIN ASPART 4 UNITS: 100 INJECTION, SOLUTION INTRAVENOUS; SUBCUTANEOUS at 11:12

## 2021-12-30 RX ADMIN — CIPROFLOXACIN 500 MG: 500 TABLET, FILM COATED ORAL at 02:12

## 2021-12-30 RX ADMIN — METOPROLOL TARTRATE 25 MG: 25 TABLET, FILM COATED ORAL at 08:12

## 2021-12-30 RX ADMIN — FAMOTIDINE 20 MG: 20 TABLET ORAL at 08:12

## 2021-12-30 RX ADMIN — LEVOTHYROXINE SODIUM 50 MCG: 50 TABLET ORAL at 05:12

## 2021-12-30 RX ADMIN — IPRATROPIUM BROMIDE AND ALBUTEROL SULFATE 3 ML: .5; 3 SOLUTION RESPIRATORY (INHALATION) at 01:12

## 2021-12-30 RX ADMIN — ANASTROZOLE 1 MG: 1 TABLET, COATED ORAL at 08:12

## 2021-12-30 RX ADMIN — ATORVASTATIN CALCIUM 80 MG: 20 TABLET, FILM COATED ORAL at 08:12

## 2021-12-30 RX ADMIN — IPRATROPIUM BROMIDE AND ALBUTEROL SULFATE 3 ML: .5; 3 SOLUTION RESPIRATORY (INHALATION) at 08:12

## 2021-12-30 RX ADMIN — FUROSEMIDE 160 MG: 80 TABLET ORAL at 08:12

## 2021-12-30 RX ADMIN — HYDROCODONE BITARTRATE AND ACETAMINOPHEN 1 TABLET: 10; 325 TABLET ORAL at 05:12

## 2022-01-01 LAB — BLOOD GROUP ANTIBODIES SERPL: NORMAL

## 2022-01-04 LAB
FOLATE: 9.8 NG/ML
HBV SURFACE AG SERPL QL IA: NEGATIVE
HCV AB SERPL QL IA: NONREACTIVE
HCV S/CO RATIO(INDEX): 0.06 (ref 0–0.79)

## 2022-01-07 LAB
ALBUMIN SERPL ELPH-MCNC: 2.02 G/DL (ref 3.75–5.01)
IMMUNOFIXATION REFLEX: ABNORMAL
Lab: 0.39 G/DL (ref 0.19–0.46)
Lab: 0.56 G/DL (ref 0.48–1.1)
Lab: 0.92 G/DL (ref 0.48–1.05)
Lab: 1 G/DL (ref 0.62–1.51)
Lab: ABNORMAL
PROT SERPL-MCNC: 4.9 G/DL (ref 6.3–8.2)
SPE INTERPRETATION: ABNORMAL

## 2022-01-08 LAB — BUN, POST: 8 MG/DL (ref 6–19)

## 2022-01-10 LAB
BUN SERPL-MCNC: 22 MG/DL (ref 6–19)
BUN/CREAT SERPL: 11.5 (ref 10–20)
CREAT SERPL-MCNC: 1.91 MG/DL (ref 0.6–1.3)
UREA REDUCTION RATIO: 64 % (ref 65–80)

## 2022-01-11 ENCOUNTER — PES CALL (OUTPATIENT)
Dept: ADMINISTRATIVE | Facility: CLINIC | Age: 72
End: 2022-01-11
Payer: MEDICARE

## 2022-01-13 LAB
25(OH)D3 SERPL-MCNC: 61.7 NG/ML (ref 30–100)
IRON: 48 MCG/DL (ref 30–160)
TOTAL IRON BINDING CAPACITY: 165 MCG/DL (ref 185–515)
TRANSFERRIN SATURATION: 29 % (ref 20–55)
UIBC SERPL-MCNC: 117 MCG/DL (ref 155–355)

## 2022-01-19 PROCEDURE — G0180 PR HOME HEALTH MD CERTIFICATION: ICD-10-PCS | Mod: ,,, | Performed by: INTERNAL MEDICINE

## 2022-01-19 PROCEDURE — G0180 MD CERTIFICATION HHA PATIENT: HCPCS | Mod: ,,, | Performed by: INTERNAL MEDICINE

## 2022-01-20 ENCOUNTER — HOSPITAL ENCOUNTER (EMERGENCY)
Facility: HOSPITAL | Age: 72
Discharge: HOME | End: 2022-01-21
Attending: EMERGENCY MEDICINE
Payer: MEDICARE

## 2022-01-20 DIAGNOSIS — R07.9 CHEST PAIN: ICD-10-CM

## 2022-01-20 LAB
ALBUMIN SERPL BCP-MCNC: 1.9 G/DL (ref 3.5–5.2)
ALP SERPL-CCNC: 182 U/L (ref 55–135)
ALT SERPL W/O P-5'-P-CCNC: 17 U/L (ref 10–44)
ANION GAP SERPL CALC-SCNC: 13 MMOL/L (ref 8–16)
AST SERPL-CCNC: 24 U/L (ref 10–40)
BACTERIA #/AREA URNS AUTO: ABNORMAL /HPF
BASOPHILS # BLD AUTO: 0.03 K/UL (ref 0–0.2)
BASOPHILS NFR BLD: 0.4 % (ref 0–1.9)
BILIRUB SERPL-MCNC: 0.3 MG/DL (ref 0.1–1)
BILIRUB UR QL STRIP: NEGATIVE
BNP SERPL-MCNC: 190 PG/ML (ref 0–99)
BUN SERPL-MCNC: 32 MG/DL (ref 8–23)
CALCIUM SERPL-MCNC: 8.7 MG/DL (ref 8.7–10.5)
CHLORIDE SERPL-SCNC: 90 MMOL/L (ref 95–110)
CLARITY UR REFRACT.AUTO: ABNORMAL
CO2 SERPL-SCNC: 23 MMOL/L (ref 23–29)
COLOR UR AUTO: ABNORMAL
CREAT SERPL-MCNC: 2.3 MG/DL (ref 0.5–1.4)
CTP QC/QA: YES
DIFFERENTIAL METHOD: ABNORMAL
EOSINOPHIL # BLD AUTO: 0.3 K/UL (ref 0–0.5)
EOSINOPHIL NFR BLD: 3.4 % (ref 0–8)
ERYTHROCYTE [DISTWIDTH] IN BLOOD BY AUTOMATED COUNT: 14.6 % (ref 11.5–14.5)
EST. GFR  (AFRICAN AMERICAN): 24.3 ML/MIN/1.73 M^2
EST. GFR  (NON AFRICAN AMERICAN): 21.1 ML/MIN/1.73 M^2
GLUCOSE SERPL-MCNC: 228 MG/DL (ref 70–110)
GLUCOSE UR QL STRIP: NEGATIVE
HCT VFR BLD AUTO: 28.6 % (ref 37–48.5)
HGB BLD-MCNC: 9.2 G/DL (ref 12–16)
HGB UR QL STRIP: ABNORMAL
HYALINE CASTS UR QL AUTO: 0 /LPF
IMM GRANULOCYTES # BLD AUTO: 0.1 K/UL (ref 0–0.04)
IMM GRANULOCYTES NFR BLD AUTO: 1.3 % (ref 0–0.5)
KETONES UR QL STRIP: NEGATIVE
LEUKOCYTE ESTERASE UR QL STRIP: ABNORMAL
LYMPHOCYTES # BLD AUTO: 1.6 K/UL (ref 1–4.8)
LYMPHOCYTES NFR BLD: 20.1 % (ref 18–48)
MCH RBC QN AUTO: 29 PG (ref 27–31)
MCHC RBC AUTO-ENTMCNC: 32.2 G/DL (ref 32–36)
MCV RBC AUTO: 90 FL (ref 82–98)
MICROSCOPIC COMMENT: ABNORMAL
MONOCYTES # BLD AUTO: 0.8 K/UL (ref 0.3–1)
MONOCYTES NFR BLD: 9.7 % (ref 4–15)
NEUTROPHILS # BLD AUTO: 5 K/UL (ref 1.8–7.7)
NEUTROPHILS NFR BLD: 65.1 % (ref 38–73)
NITRITE UR QL STRIP: NEGATIVE
NRBC BLD-RTO: 0 /100 WBC
PH UR STRIP: 6 [PH] (ref 5–8)
PLATELET # BLD AUTO: 299 K/UL (ref 150–450)
PMV BLD AUTO: 9.1 FL (ref 9.2–12.9)
POTASSIUM SERPL-SCNC: 4.1 MMOL/L (ref 3.5–5.1)
PROT SERPL-MCNC: 7 G/DL (ref 6–8.4)
PROT UR QL STRIP: ABNORMAL
RBC # BLD AUTO: 3.17 M/UL (ref 4–5.4)
RBC #/AREA URNS AUTO: 55 /HPF (ref 0–4)
SARS-COV-2 RDRP RESP QL NAA+PROBE: NEGATIVE
SODIUM SERPL-SCNC: 126 MMOL/L (ref 136–145)
SP GR UR STRIP: 1.01 (ref 1–1.03)
TROPONIN I SERPL DL<=0.01 NG/ML-MCNC: 0.01 NG/ML (ref 0–0.03)
URN SPEC COLLECT METH UR: ABNORMAL
WBC # BLD AUTO: 7.73 K/UL (ref 3.9–12.7)
WBC #/AREA URNS AUTO: >100 /HPF (ref 0–5)
WBC CLUMPS UR QL AUTO: ABNORMAL
YEAST UR QL AUTO: ABNORMAL

## 2022-01-20 PROCEDURE — 93010 EKG 12-LEAD: ICD-10-PCS | Mod: ,,, | Performed by: INTERNAL MEDICINE

## 2022-01-20 PROCEDURE — 99285 PR EMERGENCY DEPT VISIT,LEVEL V: ICD-10-PCS | Mod: GC,CS,, | Performed by: EMERGENCY MEDICINE

## 2022-01-20 PROCEDURE — 99285 EMERGENCY DEPT VISIT HI MDM: CPT | Mod: GC,CS,, | Performed by: EMERGENCY MEDICINE

## 2022-01-20 PROCEDURE — 99285 EMERGENCY DEPT VISIT HI MDM: CPT | Mod: 25,HCNC

## 2022-01-20 PROCEDURE — 83880 ASSAY OF NATRIURETIC PEPTIDE: CPT | Performed by: EMERGENCY MEDICINE

## 2022-01-20 PROCEDURE — 87086 URINE CULTURE/COLONY COUNT: CPT | Performed by: EMERGENCY MEDICINE

## 2022-01-20 PROCEDURE — 84484 ASSAY OF TROPONIN QUANT: CPT | Performed by: EMERGENCY MEDICINE

## 2022-01-20 PROCEDURE — 81001 URINALYSIS AUTO W/SCOPE: CPT | Performed by: EMERGENCY MEDICINE

## 2022-01-20 PROCEDURE — 93005 ELECTROCARDIOGRAM TRACING: CPT

## 2022-01-20 PROCEDURE — 87077 CULTURE AEROBIC IDENTIFY: CPT | Mod: HCNC | Performed by: EMERGENCY MEDICINE

## 2022-01-20 PROCEDURE — U0002 COVID-19 LAB TEST NON-CDC: HCPCS | Performed by: PHYSICIAN ASSISTANT

## 2022-01-20 PROCEDURE — 87088 URINE BACTERIA CULTURE: CPT | Mod: HCNC | Performed by: EMERGENCY MEDICINE

## 2022-01-20 PROCEDURE — 87186 SC STD MICRODIL/AGAR DIL: CPT | Mod: HCNC | Performed by: EMERGENCY MEDICINE

## 2022-01-20 PROCEDURE — 93010 ELECTROCARDIOGRAM REPORT: CPT | Mod: ,,, | Performed by: INTERNAL MEDICINE

## 2022-01-20 PROCEDURE — 85025 COMPLETE CBC W/AUTO DIFF WBC: CPT | Performed by: EMERGENCY MEDICINE

## 2022-01-20 PROCEDURE — 80053 COMPREHEN METABOLIC PANEL: CPT | Performed by: EMERGENCY MEDICINE

## 2022-01-21 ENCOUNTER — HOSPITAL ENCOUNTER (INPATIENT)
Facility: HOSPITAL | Age: 72
LOS: 11 days | Discharge: HOME HEALTH | DRG: 698 | End: 2022-02-05
Attending: EMERGENCY MEDICINE | Admitting: HOSPITALIST
Payer: MEDICARE

## 2022-01-21 VITALS
HEART RATE: 94 BPM | RESPIRATION RATE: 16 BRPM | OXYGEN SATURATION: 98 % | DIASTOLIC BLOOD PRESSURE: 77 MMHG | HEIGHT: 63 IN | TEMPERATURE: 99 F | WEIGHT: 265 LBS | BODY MASS INDEX: 46.95 KG/M2 | SYSTOLIC BLOOD PRESSURE: 144 MMHG

## 2022-01-21 DIAGNOSIS — Z99.2 DIALYSIS PATIENT: Chronic | ICD-10-CM

## 2022-01-21 DIAGNOSIS — R07.9 CHEST PAIN: ICD-10-CM

## 2022-01-21 DIAGNOSIS — F33.1 MAJOR DEPRESSIVE DISORDER, RECURRENT EPISODE, MODERATE: Chronic | ICD-10-CM

## 2022-01-21 DIAGNOSIS — E78.5 HYPERLIPIDEMIA ASSOCIATED WITH TYPE 2 DIABETES MELLITUS: Chronic | ICD-10-CM

## 2022-01-21 DIAGNOSIS — N39.0 URINARY TRACT INFECTION DUE TO ESBL KLEBSIELLA: ICD-10-CM

## 2022-01-21 DIAGNOSIS — E87.1 HYPONATREMIA: Chronic | ICD-10-CM

## 2022-01-21 DIAGNOSIS — G47.33 OSA (OBSTRUCTIVE SLEEP APNEA): Chronic | ICD-10-CM

## 2022-01-21 DIAGNOSIS — G89.29 OTHER CHRONIC PAIN: Chronic | ICD-10-CM

## 2022-01-21 DIAGNOSIS — M15.9 PRIMARY OSTEOARTHRITIS INVOLVING MULTIPLE JOINTS: ICD-10-CM

## 2022-01-21 DIAGNOSIS — N39.0 URINARY TRACT INFECTION WITHOUT HEMATURIA, SITE UNSPECIFIED: ICD-10-CM

## 2022-01-21 DIAGNOSIS — N18.9 CHRONIC KIDNEY DISEASE-MINERAL AND BONE DISORDER: ICD-10-CM

## 2022-01-21 DIAGNOSIS — E83.9 CHRONIC KIDNEY DISEASE-MINERAL AND BONE DISORDER: ICD-10-CM

## 2022-01-21 DIAGNOSIS — N18.6 ESRD (END STAGE RENAL DISEASE): Primary | ICD-10-CM

## 2022-01-21 DIAGNOSIS — I48.0 PAROXYSMAL ATRIAL FIBRILLATION: Chronic | ICD-10-CM

## 2022-01-21 DIAGNOSIS — N17.9 AKI (ACUTE KIDNEY INJURY): ICD-10-CM

## 2022-01-21 DIAGNOSIS — M89.9 CHRONIC KIDNEY DISEASE-MINERAL AND BONE DISORDER: ICD-10-CM

## 2022-01-21 DIAGNOSIS — N31.9 NEUROGENIC BLADDER: Chronic | ICD-10-CM

## 2022-01-21 DIAGNOSIS — R53.81 DEBILITY: Chronic | ICD-10-CM

## 2022-01-21 DIAGNOSIS — B96.89 URINARY TRACT INFECTION DUE TO ESBL KLEBSIELLA: ICD-10-CM

## 2022-01-21 DIAGNOSIS — C50.612 MALIGNANT NEOPLASM OF AXILLARY TAIL OF LEFT BREAST IN FEMALE, ESTROGEN RECEPTOR POSITIVE: Chronic | ICD-10-CM

## 2022-01-21 DIAGNOSIS — N39.0 RECURRENT URINARY TRACT INFECTION: Chronic | ICD-10-CM

## 2022-01-21 DIAGNOSIS — D64.9 NORMOCYTIC ANEMIA: Chronic | ICD-10-CM

## 2022-01-21 DIAGNOSIS — Z17.0 MALIGNANT NEOPLASM OF AXILLARY TAIL OF LEFT BREAST IN FEMALE, ESTROGEN RECEPTOR POSITIVE: Chronic | ICD-10-CM

## 2022-01-21 DIAGNOSIS — M79.7 FIBROMYALGIA: Chronic | ICD-10-CM

## 2022-01-21 DIAGNOSIS — E03.9 ACQUIRED HYPOTHYROIDISM: Chronic | ICD-10-CM

## 2022-01-21 DIAGNOSIS — E11.69 HYPERLIPIDEMIA ASSOCIATED WITH TYPE 2 DIABETES MELLITUS: Chronic | ICD-10-CM

## 2022-01-21 DIAGNOSIS — E66.01 MORBID OBESITY DUE TO EXCESS CALORIES: Chronic | ICD-10-CM

## 2022-01-21 DIAGNOSIS — L89.313 PRESSURE INJURY OF RIGHT BUTTOCK, STAGE 3: ICD-10-CM

## 2022-01-21 DIAGNOSIS — I15.9 SECONDARY HYPERTENSION: ICD-10-CM

## 2022-01-21 PROBLEM — I48.91 A-FIB: Chronic | Status: ACTIVE | Noted: 2021-11-29

## 2022-01-21 PROBLEM — Z74.1 REQUIRES DAILY ASSISTANCE FOR ACTIVITIES OF DAILY LIVING (ADL) AND COMFORT NEEDS: Chronic | Status: ACTIVE | Noted: 2019-09-25

## 2022-01-21 PROBLEM — C50.912 MALIGNANT NEOPLASM OF LEFT BREAST, ESTROGEN RECEPTOR POSITIVE: Chronic | Status: ACTIVE | Noted: 2019-08-01

## 2022-01-21 LAB
ALBUMIN SERPL BCP-MCNC: 1.8 G/DL (ref 3.5–5.2)
ALP SERPL-CCNC: 163 U/L (ref 55–135)
ALT SERPL W/O P-5'-P-CCNC: 16 U/L (ref 10–44)
ANION GAP SERPL CALC-SCNC: 9 MMOL/L (ref 8–16)
ANION GAP SERPL CALC-SCNC: 9 MMOL/L (ref 8–16)
AST SERPL-CCNC: 12 U/L (ref 10–40)
BACTERIA #/AREA URNS AUTO: ABNORMAL /HPF
BASOPHILS # BLD AUTO: 0.03 K/UL (ref 0–0.2)
BASOPHILS NFR BLD: 0.5 % (ref 0–1.9)
BILIRUB SERPL-MCNC: 0.3 MG/DL (ref 0.1–1)
BILIRUB UR QL STRIP: NEGATIVE
BNP SERPL-MCNC: 167 PG/ML (ref 0–99)
BUN SERPL-MCNC: 31 MG/DL (ref 8–23)
BUN SERPL-MCNC: 31 MG/DL (ref 8–23)
CALCIUM SERPL-MCNC: 8.2 MG/DL (ref 8.7–10.5)
CALCIUM SERPL-MCNC: 8.7 MG/DL (ref 8.7–10.5)
CHLORIDE SERPL-SCNC: 90 MMOL/L (ref 95–110)
CHLORIDE SERPL-SCNC: 95 MMOL/L (ref 95–110)
CLARITY UR REFRACT.AUTO: ABNORMAL
CO2 SERPL-SCNC: 25 MMOL/L (ref 23–29)
CO2 SERPL-SCNC: 27 MMOL/L (ref 23–29)
COLOR UR AUTO: YELLOW
CREAT SERPL-MCNC: 2 MG/DL (ref 0.5–1.4)
CREAT SERPL-MCNC: 2.2 MG/DL (ref 0.5–1.4)
CTP QC/QA: YES
DIFFERENTIAL METHOD: ABNORMAL
EOSINOPHIL # BLD AUTO: 0.3 K/UL (ref 0–0.5)
EOSINOPHIL NFR BLD: 4.2 % (ref 0–8)
ERYTHROCYTE [DISTWIDTH] IN BLOOD BY AUTOMATED COUNT: 14.7 % (ref 11.5–14.5)
EST. GFR  (AFRICAN AMERICAN): 25.6 ML/MIN/1.73 M^2
EST. GFR  (AFRICAN AMERICAN): 28.7 ML/MIN/1.73 M^2
EST. GFR  (NON AFRICAN AMERICAN): 22.2 ML/MIN/1.73 M^2
EST. GFR  (NON AFRICAN AMERICAN): 24.9 ML/MIN/1.73 M^2
GLUCOSE SERPL-MCNC: 162 MG/DL (ref 70–110)
GLUCOSE SERPL-MCNC: 197 MG/DL (ref 70–110)
GLUCOSE UR QL STRIP: ABNORMAL
HCT VFR BLD AUTO: 27.7 % (ref 37–48.5)
HGB BLD-MCNC: 8.9 G/DL (ref 12–16)
HGB UR QL STRIP: ABNORMAL
HYALINE CASTS UR QL AUTO: 1 /LPF
IMM GRANULOCYTES # BLD AUTO: 0.1 K/UL (ref 0–0.04)
IMM GRANULOCYTES NFR BLD AUTO: 1.5 % (ref 0–0.5)
KETONES UR QL STRIP: NEGATIVE
LEUKOCYTE ESTERASE UR QL STRIP: ABNORMAL
LYMPHOCYTES # BLD AUTO: 1.4 K/UL (ref 1–4.8)
LYMPHOCYTES NFR BLD: 21.7 % (ref 18–48)
MAGNESIUM SERPL-MCNC: 1.9 MG/DL (ref 1.6–2.6)
MCH RBC QN AUTO: 29 PG (ref 27–31)
MCHC RBC AUTO-ENTMCNC: 32.1 G/DL (ref 32–36)
MCV RBC AUTO: 90 FL (ref 82–98)
MICROSCOPIC COMMENT: ABNORMAL
MONOCYTES # BLD AUTO: 0.8 K/UL (ref 0.3–1)
MONOCYTES NFR BLD: 12.2 % (ref 4–15)
NEUTROPHILS # BLD AUTO: 3.9 K/UL (ref 1.8–7.7)
NEUTROPHILS NFR BLD: 59.9 % (ref 38–73)
NITRITE UR QL STRIP: NEGATIVE
NRBC BLD-RTO: 0 /100 WBC
PH UR STRIP: 6 [PH] (ref 5–8)
PHOSPHATE SERPL-MCNC: 4.4 MG/DL (ref 2.7–4.5)
PLATELET # BLD AUTO: 276 K/UL (ref 150–450)
PMV BLD AUTO: 8.8 FL (ref 9.2–12.9)
POTASSIUM SERPL-SCNC: 3.4 MMOL/L (ref 3.5–5.1)
POTASSIUM SERPL-SCNC: 3.6 MMOL/L (ref 3.5–5.1)
PROT SERPL-MCNC: 6.4 G/DL (ref 6–8.4)
PROT UR QL STRIP: ABNORMAL
RBC # BLD AUTO: 3.07 M/UL (ref 4–5.4)
RBC #/AREA URNS AUTO: 80 /HPF (ref 0–4)
SARS-COV-2 RDRP RESP QL NAA+PROBE: NEGATIVE
SODIUM SERPL-SCNC: 126 MMOL/L (ref 136–145)
SODIUM SERPL-SCNC: 129 MMOL/L (ref 136–145)
SP GR UR STRIP: 1.01 (ref 1–1.03)
TROPONIN I SERPL DL<=0.01 NG/ML-MCNC: 0.02 NG/ML (ref 0–0.03)
URN SPEC COLLECT METH UR: ABNORMAL
WBC # BLD AUTO: 6.49 K/UL (ref 3.9–12.7)
WBC #/AREA URNS AUTO: >100 /HPF (ref 0–5)
WBC CLUMPS UR QL AUTO: ABNORMAL

## 2022-01-21 PROCEDURE — 81001 URINALYSIS AUTO W/SCOPE: CPT | Mod: HCNC | Performed by: STUDENT IN AN ORGANIZED HEALTH CARE EDUCATION/TRAINING PROGRAM

## 2022-01-21 PROCEDURE — 83880 ASSAY OF NATRIURETIC PEPTIDE: CPT | Mod: HCNC | Performed by: STUDENT IN AN ORGANIZED HEALTH CARE EDUCATION/TRAINING PROGRAM

## 2022-01-21 PROCEDURE — 84100 ASSAY OF PHOSPHORUS: CPT | Mod: HCNC | Performed by: STUDENT IN AN ORGANIZED HEALTH CARE EDUCATION/TRAINING PROGRAM

## 2022-01-21 PROCEDURE — 27000190 HC CPAP FULL FACE MASK W/VALVE: Mod: HCNC

## 2022-01-21 PROCEDURE — 93005 ELECTROCARDIOGRAM TRACING: CPT

## 2022-01-21 PROCEDURE — 87186 SC STD MICRODIL/AGAR DIL: CPT | Mod: HCNC | Performed by: STUDENT IN AN ORGANIZED HEALTH CARE EDUCATION/TRAINING PROGRAM

## 2022-01-21 PROCEDURE — 99900035 HC TECH TIME PER 15 MIN (STAT): Mod: HCNC

## 2022-01-21 PROCEDURE — 99285 EMERGENCY DEPT VISIT HI MDM: CPT | Mod: 25,HCNC

## 2022-01-21 PROCEDURE — U0002 COVID-19 LAB TEST NON-CDC: HCPCS | Performed by: STUDENT IN AN ORGANIZED HEALTH CARE EDUCATION/TRAINING PROGRAM

## 2022-01-21 PROCEDURE — 94660 CPAP INITIATION&MGMT: CPT | Mod: HCNC

## 2022-01-21 PROCEDURE — 84484 ASSAY OF TROPONIN QUANT: CPT | Mod: HCNC | Performed by: STUDENT IN AN ORGANIZED HEALTH CARE EDUCATION/TRAINING PROGRAM

## 2022-01-21 PROCEDURE — 94761 N-INVAS EAR/PLS OXIMETRY MLT: CPT | Mod: HCNC

## 2022-01-21 PROCEDURE — 80053 COMPREHEN METABOLIC PANEL: CPT | Mod: HCNC | Performed by: STUDENT IN AN ORGANIZED HEALTH CARE EDUCATION/TRAINING PROGRAM

## 2022-01-21 PROCEDURE — G0378 HOSPITAL OBSERVATION PER HR: HCPCS | Mod: HCNC

## 2022-01-21 PROCEDURE — 83735 ASSAY OF MAGNESIUM: CPT | Mod: HCNC | Performed by: STUDENT IN AN ORGANIZED HEALTH CARE EDUCATION/TRAINING PROGRAM

## 2022-01-21 PROCEDURE — 27000221 HC OXYGEN, UP TO 24 HOURS: Mod: HCNC

## 2022-01-21 PROCEDURE — 87086 URINE CULTURE/COLONY COUNT: CPT | Mod: HCNC | Performed by: STUDENT IN AN ORGANIZED HEALTH CARE EDUCATION/TRAINING PROGRAM

## 2022-01-21 PROCEDURE — 87088 URINE BACTERIA CULTURE: CPT | Mod: HCNC | Performed by: STUDENT IN AN ORGANIZED HEALTH CARE EDUCATION/TRAINING PROGRAM

## 2022-01-21 PROCEDURE — 80048 BASIC METABOLIC PNL TOTAL CA: CPT | Mod: HCNC

## 2022-01-21 PROCEDURE — 25000003 PHARM REV CODE 250: Mod: HCNC | Performed by: STUDENT IN AN ORGANIZED HEALTH CARE EDUCATION/TRAINING PROGRAM

## 2022-01-21 PROCEDURE — 25000003 PHARM REV CODE 250: Mod: HCNC

## 2022-01-21 PROCEDURE — 87077 CULTURE AEROBIC IDENTIFY: CPT | Mod: HCNC | Performed by: STUDENT IN AN ORGANIZED HEALTH CARE EDUCATION/TRAINING PROGRAM

## 2022-01-21 PROCEDURE — 85025 COMPLETE CBC W/AUTO DIFF WBC: CPT | Mod: HCNC | Performed by: STUDENT IN AN ORGANIZED HEALTH CARE EDUCATION/TRAINING PROGRAM

## 2022-01-21 RX ORDER — ERGOCALCIFEROL 1.25 MG/1
50000 CAPSULE ORAL
Status: DISCONTINUED | OUTPATIENT
Start: 2022-01-22 | End: 2022-02-05 | Stop reason: HOSPADM

## 2022-01-21 RX ORDER — LEVOTHYROXINE SODIUM 50 UG/1
50 TABLET ORAL
Status: DISCONTINUED | OUTPATIENT
Start: 2022-01-22 | End: 2022-02-05 | Stop reason: HOSPADM

## 2022-01-21 RX ORDER — POLYETHYLENE GLYCOL 3350 17 G/17G
17 POWDER, FOR SOLUTION ORAL DAILY
Status: DISCONTINUED | OUTPATIENT
Start: 2022-01-22 | End: 2022-01-25

## 2022-01-21 RX ORDER — AMOXICILLIN 250 MG
1 CAPSULE ORAL 2 TIMES DAILY
Status: DISCONTINUED | OUTPATIENT
Start: 2022-01-21 | End: 2022-02-05 | Stop reason: HOSPADM

## 2022-01-21 RX ORDER — HEPARIN SODIUM 1000 [USP'U]/ML
1000 INJECTION, SOLUTION INTRAVENOUS; SUBCUTANEOUS
Status: DISCONTINUED | OUTPATIENT
Start: 2022-01-22 | End: 2022-01-24

## 2022-01-21 RX ORDER — FUROSEMIDE 80 MG/1
160 TABLET ORAL 2 TIMES DAILY
Status: DISCONTINUED | OUTPATIENT
Start: 2022-01-21 | End: 2022-01-28

## 2022-01-21 RX ORDER — SUMATRIPTAN 50 MG/1
100 TABLET, FILM COATED ORAL 2 TIMES DAILY PRN
Status: DISCONTINUED | OUTPATIENT
Start: 2022-01-21 | End: 2022-02-05 | Stop reason: HOSPADM

## 2022-01-21 RX ORDER — ATORVASTATIN CALCIUM 20 MG/1
80 TABLET, FILM COATED ORAL DAILY
Status: DISCONTINUED | OUTPATIENT
Start: 2022-01-22 | End: 2022-02-05 | Stop reason: HOSPADM

## 2022-01-21 RX ORDER — OXYCODONE HYDROCHLORIDE 10 MG/1
10 TABLET ORAL EVERY 4 HOURS PRN
Status: DISCONTINUED | OUTPATIENT
Start: 2022-01-21 | End: 2022-02-05 | Stop reason: HOSPADM

## 2022-01-21 RX ORDER — TALC
6 POWDER (GRAM) TOPICAL NIGHTLY PRN
Status: DISCONTINUED | OUTPATIENT
Start: 2022-01-21 | End: 2022-02-05 | Stop reason: HOSPADM

## 2022-01-21 RX ORDER — METOPROLOL TARTRATE 25 MG/1
25 TABLET, FILM COATED ORAL 2 TIMES DAILY
Status: DISCONTINUED | OUTPATIENT
Start: 2022-01-21 | End: 2022-02-05 | Stop reason: HOSPADM

## 2022-01-21 RX ORDER — ALBUTEROL SULFATE 90 UG/1
2 AEROSOL, METERED RESPIRATORY (INHALATION) EVERY 6 HOURS PRN
Status: DISCONTINUED | OUTPATIENT
Start: 2022-01-21 | End: 2022-01-24

## 2022-01-21 RX ORDER — SODIUM CHLORIDE 0.9 % (FLUSH) 0.9 %
10 SYRINGE (ML) INJECTION
Status: DISCONTINUED | OUTPATIENT
Start: 2022-01-21 | End: 2022-02-05 | Stop reason: HOSPADM

## 2022-01-21 RX ORDER — OXYBUTYNIN CHLORIDE 10 MG/1
10 TABLET, EXTENDED RELEASE ORAL DAILY
Status: DISCONTINUED | OUTPATIENT
Start: 2022-01-22 | End: 2022-02-05 | Stop reason: HOSPADM

## 2022-01-21 RX ORDER — METHOCARBAMOL 750 MG/1
750 TABLET, FILM COATED ORAL 3 TIMES DAILY PRN
Status: DISCONTINUED | OUTPATIENT
Start: 2022-01-21 | End: 2022-02-05 | Stop reason: HOSPADM

## 2022-01-21 RX ORDER — FAMOTIDINE 20 MG/1
20 TABLET, FILM COATED ORAL DAILY
Status: DISCONTINUED | OUTPATIENT
Start: 2022-01-22 | End: 2022-02-05 | Stop reason: HOSPADM

## 2022-01-21 RX ORDER — GEMFIBROZIL 600 MG/1
600 TABLET, FILM COATED ORAL
Status: DISCONTINUED | OUTPATIENT
Start: 2022-01-21 | End: 2022-02-05 | Stop reason: HOSPADM

## 2022-01-21 RX ORDER — ONDANSETRON 2 MG/ML
4 INJECTION INTRAMUSCULAR; INTRAVENOUS EVERY 8 HOURS PRN
Status: DISCONTINUED | OUTPATIENT
Start: 2022-01-21 | End: 2022-02-05 | Stop reason: HOSPADM

## 2022-01-21 RX ORDER — MUPIROCIN 20 MG/G
OINTMENT TOPICAL 2 TIMES DAILY
Status: DISPENSED | OUTPATIENT
Start: 2022-01-22 | End: 2022-01-27

## 2022-01-21 RX ORDER — CIPROFLOXACIN 500 MG/1
500 TABLET ORAL
Status: COMPLETED | OUTPATIENT
Start: 2022-01-21 | End: 2022-01-21

## 2022-01-21 RX ORDER — CIPROFLOXACIN 500 MG/1
500 TABLET ORAL EVERY 12 HOURS
Status: DISCONTINUED | OUTPATIENT
Start: 2022-01-21 | End: 2022-01-22

## 2022-01-21 RX ORDER — BUTALBITAL, ACETAMINOPHEN AND CAFFEINE 50; 325; 40 MG/1; MG/1; MG/1
1 TABLET ORAL DAILY PRN
Status: DISCONTINUED | OUTPATIENT
Start: 2022-01-21 | End: 2022-02-05 | Stop reason: HOSPADM

## 2022-01-21 RX ORDER — ANASTROZOLE 1 MG/1
1 TABLET ORAL DAILY
Status: DISCONTINUED | OUTPATIENT
Start: 2022-01-22 | End: 2022-02-05 | Stop reason: HOSPADM

## 2022-01-21 RX ORDER — SODIUM CHLORIDE 9 MG/ML
INJECTION, SOLUTION INTRAVENOUS ONCE
Status: COMPLETED | OUTPATIENT
Start: 2022-01-22 | End: 2022-01-22

## 2022-01-21 RX ORDER — ACETAMINOPHEN 325 MG/1
650 TABLET ORAL EVERY 4 HOURS PRN
Status: DISCONTINUED | OUTPATIENT
Start: 2022-01-21 | End: 2022-02-05 | Stop reason: HOSPADM

## 2022-01-21 RX ORDER — CLONIDINE HYDROCHLORIDE 0.2 MG/1
0.2 TABLET ORAL 3 TIMES DAILY
Status: DISCONTINUED | OUTPATIENT
Start: 2022-01-21 | End: 2022-02-05 | Stop reason: HOSPADM

## 2022-01-21 RX ADMIN — CIPROFLOXACIN 500 MG: 500 TABLET, FILM COATED ORAL at 08:01

## 2022-01-21 RX ADMIN — CIPROFLOXACIN HYDROCHLORIDE 500 MG: 500 TABLET, FILM COATED ORAL at 03:01

## 2022-01-21 RX ADMIN — METHOCARBAMOL 750 MG: 750 TABLET ORAL at 05:01

## 2022-01-21 RX ADMIN — CLONIDINE HYDROCHLORIDE 0.2 MG: 0.2 TABLET ORAL at 08:01

## 2022-01-21 RX ADMIN — OXYCODONE HYDROCHLORIDE 10 MG: 10 TABLET ORAL at 05:01

## 2022-01-21 RX ADMIN — FUROSEMIDE 160 MG: 80 TABLET ORAL at 08:01

## 2022-01-21 RX ADMIN — APIXABAN 2.5 MG: 2.5 TABLET, FILM COATED ORAL at 08:01

## 2022-01-21 RX ADMIN — METOPROLOL TARTRATE 25 MG: 25 TABLET, FILM COATED ORAL at 08:01

## 2022-01-21 RX ADMIN — GEMFIBROZIL 600 MG: 600 TABLET ORAL at 08:01

## 2022-01-21 NOTE — DISCHARGE INSTRUCTIONS
You were seen in the hospital for chest heaviness which improved while you were in the emergency room. We checked for a heart attack and you did not have one. You had some fluid on your Chest X-Ray. Given your kidney disease and dialysis plans for tomorrow morning, we recommend you keep your dialysis schedule and continue taking your home medications as instructed. Please come back if you chest discomfort returns and is worse.

## 2022-01-21 NOTE — ED TRIAGE NOTES
Cecile Bowen, a 71 y.o. female presents to the ED w/ complaint of SOB, chest pain starting around 4:30 this afternoon. Pt denies n/v/d, fever, headache, lightheadedness, fever. Pt reports that she does dialysis on M/F/W. A&Ox4, satting at 96% on RA.     Triage note:  Chief Complaint   Patient presents with    Shortness of Breath     Pt on dialysis with last full treatment on Monday. Pt has murillo cath in place with purulent drainage. Pt A&O x4     Review of patient's allergies indicates:  No Known Allergies  Past Medical History:   Diagnosis Date    Cervicogenic migraine     Chronic pain     CKD (chronic kidney disease) stage 4, GFR 15-29 ml/min     [facility]    CKD (chronic kidney disease) stage 4, GFR 15-29 ml/min     Diabetes mellitus     Long term use of Insulin, Diabetic Neuropathy    Fibromyalgia     Hydronephrosis     Hyperlipidemia     Hypertension 12/12/2012    Hypothyroidism 12/12/2012    ARON (iron deficiency anemia)     Insomnia     Levoscoliosis     Malignant neoplasm of upper-outer quadrant of left breast in female, estrogen receptor positive     Metabolic acidosis     Mobility impaired     Nuclear sclerosis - Both Eyes 3/24/2014    VIJAYA (obstructive sleep apnea)     Osteopenia     Pulmonary nodule     Recurrent UTI     Renal manifestation of secondary diabetes mellitus     Secondary hyperparathyroidism, renal     Urinary retention

## 2022-01-21 NOTE — ASSESSMENT & PLAN NOTE
Chronic hyponatremia 130 baseline, 126 on admission  Secondary to fluid overload, UTI  Nephro consulted for HD    If hyponatremia does not return to baseline, consider urine studies

## 2022-01-21 NOTE — ED PROVIDER NOTES
Encounter Date: 1/20/2022       History     Chief Complaint   Patient presents with    Shortness of Breath     Pt on dialysis with last full treatment on Monday. Pt has murillo cath in place with purulent drainage. Pt A&O x4     Patient is a 70 yo woman with ESRD (on HD), DM2, HTN, breast cancer, and recurrent UTI presents with chest heaviness for 3.5 hours. Chest heaviness is not a pressure or squeezing sensation and it does not move anywhere. She is unsure of her medications and admits to missing some doses. She has a cough with small amount of sputum. She denies fever, chills, dizziness, SOB, syncope, and palpations. She does have significant leg swelling. She has a chronic suprapubic catheter that has been in place for 5 years. She is mostly bedbound at home but has a wheelchair for mobility but rarely uses it. She started on HD 2 months ago and missed her session yesterday (Wednesday) due to transportation issues. Her last HD was Monday prior to discharge from [facility]. She had been at [facility] since 12/30/21 when she was admitted after a long hospital stay after being found down at home 11/28/21. During her hospital stay, she was treated for possible STEMI, worsening CKD, metabolic acidosis and required intubation for acute respiratory failure. She is was discharged to [facility] and returned home on 1/17/22 after her HD session. This was her last HD session. She was supposed to go Wednesday but did not have transport properly arranged.         Review of patient's allergies indicates:  No Known Allergies  Past Medical History:   Diagnosis Date    Cervicogenic migraine     Chronic pain     CKD (chronic kidney disease) stage 4, GFR 15-29 ml/min     [facility]    CKD (chronic kidney disease) stage 4, GFR 15-29 ml/min     Diabetes mellitus     Long term use of Insulin, Diabetic Neuropathy    Fibromyalgia     Hydronephrosis     Hyperlipidemia     Hypertension 12/12/2012    Hypothyroidism 12/12/2012    ARON  (iron deficiency anemia)     Insomnia     Levoscoliosis     Malignant neoplasm of upper-outer quadrant of left breast in female, estrogen receptor positive     Metabolic acidosis     Mobility impaired     Nuclear sclerosis - Both Eyes 3/24/2014    VIJAYA (obstructive sleep apnea)     Osteopenia     Pulmonary nodule     Recurrent UTI     Renal manifestation of secondary diabetes mellitus     Secondary hyperparathyroidism, renal     Urinary retention      Past Surgical History:   Procedure Laterality Date    BREAST BIOPSY Right     benign    CHOLECYSTECTOMY      COLONOSCOPY N/A 1/13/2017    Procedure: COLONOSCOPY;  Surgeon: Morris Wiseman MD;  Location: [facility] ENDO (4TH FLR);  Service: Endoscopy;  Laterality: N/A;  Renal pt Nephrology advised to avoid phosphate preps    CYSTOSCOPY N/A 10/8/2018    Procedure: CYSTOSCOPY;  Surgeon: Ronel Whittington MD;  [facility];  Service: Urology;  Laterality: N/A;  45 min    CYSTOSCOPY N/A 3/25/2019    Procedure: CYSTOSCOPY;  Surgeon: Ronel Whittington MD;  [facility];  Service: Urology;  Laterality: N/A;  45 min    CYSTOSCOPY N/A 8/26/2019    Procedure: CYSTOSCOPY;  Surgeon: Ronel Whittington MD;  [facility];  Service: Urology;  Laterality: N/A;  45 min    CYSTOSCOPY N/A 7/2/2021    Procedure: CYSTOSCOPY;  Surgeon: Ronel Whittington MD;  [facility];  Service: Urology;  Laterality: N/A;    CYSTOSCOPY  12/23/2021    Procedure: CYSTOSCOPY;  Surgeon: Ronel Whittington MD;  [facility];  Service: Urology;;    CYSTOSCOPY W/ URETERAL STENT PLACEMENT Left 5/15/2021    Procedure: CYSTOSCOPY, WITH URETERAL STENT INSERTION;  Surgeon: Levy Sánchez Jr., MD;  [facility];  Service: Urology;  Laterality: Left;    CYSTOSCOPY WITH BIOPSY OF BLADDER N/A 1/27/2020    Procedure: CYSTOSCOPY, WITH BLADDER BIOPSY, WITH FULGURATION IF INDICATED;  Surgeon: Ronel FRAZIER  MD Pk;  Location: [address];  Service: Urology;  Laterality: N/A;    DILATION AND CURETTAGE OF UTERUS      GALLBLADDER SURGERY  2006    HYSTERECTOMY      INJECTION FOR SENTINEL NODE IDENTIFICATION Left 8/1/2019    Procedure: INJECTION, FOR SENTINEL NODE IDENTIFICATION;  Surgeon: Samia Fulton MD;  Location: [address];  Service: General;  Laterality: Left;    INJECTION OF BOTULINUM TOXIN TYPE A N/A 10/8/2018    Procedure: INJECTION, BOTULINUM TOXIN, TYPE A 300 UNITS;  Surgeon: Ronel Whittington MD;  Location: [address];  Service: Urology;  Laterality: N/A;    INJECTION OF BOTULINUM TOXIN TYPE A N/A 3/25/2019    Procedure: INJECTION, BOTULINUM TOXIN, TYPE A 300 UNITS;  Surgeon: Ronel Whittington MD;  Location: [address];  Service: Urology;  Laterality: N/A;    INJECTION OF BOTULINUM TOXIN TYPE A N/A 8/26/2019    Procedure: INJECTION, BOTULINUM TOXIN, TYPE A 300 UNITS;  Surgeon: Ronel Whittington MD;  Location: [address];  Service: Urology;  Laterality: N/A;    MASTECTOMY Left 8/1/2019    Procedure: MASTECTOMY 23 hour stay;  Surgeon: Samia Fulton MD;  Location: [address];  Service: General;  Laterality: Left;    OVARIAN CYST SURGERY  1985    REPLACEMENT OF STENT Left 12/23/2021    Procedure: REPLACEMENT, STENT;  Surgeon: Ronel Whittington MD;  Location: [address];  Service: Urology;  Laterality: Left;    SENTINEL LYMPH NODE BIOPSY Left 8/1/2019    Procedure: BIOPSY, LYMPH NODE, SENTINEL;  Surgeon: Samia Fulton MD;  Location: [address];  Service: General;  Laterality: Left;    spt placement      TONSILLECTOMY, ADENOIDECTOMY      TOTAL ABDOMINAL HYSTERECTOMY W/ BILATERAL SALPINGOOPHORECTOMY  1985     Family History   Problem Relation Age of Onset    Diabetes Sister     Kidney disease Sister         CKD III    ALS Mother         d.    Cancer Maternal Grandmother         d. colon    Cancer Paternal Grandfather         d.  lung    Scoliosis Brother         increased pain    Prostate cancer Brother         cured s/p surgery    Cancer Brother         rare cancer that got into the bones    Diabetes Maternal Aunt     Kidney disease Maternal Aunt     Diabetes Maternal Uncle     Amblyopia Neg Hx     Blindness Neg Hx     Cataracts Neg Hx     Glaucoma Neg Hx     Macular degeneration Neg Hx     Retinal detachment Neg Hx     Strabismus Neg Hx      Social History     Tobacco Use    Smoking status: Never Smoker    Smokeless tobacco: Never Used   Substance Use Topics    Alcohol use: No     Alcohol/week: 0.0 standard drinks    Drug use: No     Review of Systems   Constitutional: Negative for chills and fever.   HENT: Negative for congestion, nosebleeds and sore throat.    Eyes: Negative for photophobia and pain.   Respiratory: Positive for chest tightness (chest heaviness). Negative for cough and shortness of breath.    Cardiovascular: Negative for chest pain and palpitations.   Gastrointestinal: Negative for abdominal pain, blood in stool, diarrhea, nausea and vomiting.   Genitourinary: Negative for dysuria and hematuria.   Musculoskeletal: Negative for back pain.   Skin: Negative for color change.   Allergic/Immunologic: Negative for immunocompromised state.   Neurological: Negative for seizures, weakness and numbness.   Hematological: Negative for adenopathy.   Psychiatric/Behavioral: Negative for agitation and confusion.       Physical Exam     Initial Vitals [01/20/22 1747]   BP Pulse Resp Temp SpO2   (!) 170/80 100 18 99.1 °F (37.3 °C) 96 %      MAP       --         Physical Exam    Constitutional: No distress.   obese   HENT:   Head: Normocephalic and atraumatic.   Eyes: Pupils are equal, round, and reactive to light. Right eye exhibits no discharge. Left eye exhibits no discharge. No scleral icterus.   Neck: No thyromegaly present. No tracheal deviation present. No JVD present.   Cardiovascular: Normal rate and regular  rhythm.   No murmur heard.  Pulmonary/Chest: Breath sounds normal. No respiratory distress. She has no wheezes. She has no rhonchi.   Right TDC   Abdominal: Abdomen is soft. Bowel sounds are normal. She exhibits no distension and no mass. There is no abdominal tenderness.   Genitourinary:    Genitourinary Comments: SPB catheter in place with 1200ml cloudy, sediment urine      Musculoskeletal:         General: No tenderness or edema. Normal range of motion.     Neurological: She is alert and oriented to person, place, and time. She has normal reflexes. She displays normal reflexes. No cranial nerve deficit.   Skin: Skin is warm and dry. Capillary refill takes less than 2 seconds. No rash and no abscess noted.   Psychiatric: She has a normal mood and affect.         ED Course   Procedures  Labs Reviewed   SARS-COV-2 RDRP GENE    Narrative:     This test utilizes isothermal nucleic acid amplification   technology to detect the SARS-CoV-2 RdRp nucleic acid segment.   The analytical sensitivity (limit of detection) is 125 genome   equivalents/mL.   A POSITIVE result implies infection with the SARS-CoV-2 virus;   the patient is presumed to be contagious.     A NEGATIVE result means that SARS-CoV-2 nucleic acids are not   present above the limit of detection. A NEGATIVE result should be   treated as presumptive. It does not rule out the possibility of   COVID-19 and should not be the sole basis for treatment decisions.   If COVID-19 is strongly suspected based on clinical and exposure   history, re-testing using an alternate molecular assay should be   considered.   This test is only for use under the Food and Drug   Administration s Emergency Use Authorization (EUA).   Commercial kits are provided by [employer].   Performance characteristics of the EUA have been independently   verified by [facility] Department of   Pathology and Laboratory Medicine.    _________________________________________________________________   The authorized Fact Sheet for Healthcare Providers and the authorized Fact   Sheet for Patients of the ID NOW COVID-19 are available on the FDA   website:     https://www.fda.gov/media/595433/download  https://www.fda.gov/media/796426/download           EKG Readings: (Independently Interpreted)   Rhythm: Sinus Tachycardia.       Imaging Results    None          Medications - No data to display  Medical Decision Making:   Initial Assessment:   70 yo woman with ESRD with recently missed HD who presents with chest discomfort. Given leg swelling, and concern for missed medications, would be concerned for volume overload.   Differential Diagnosis:   Volume overload, ACS, pleurisy   Clinical Tests:   Lab Tests: Ordered  Radiological Study: Ordered  Medical Tests: Ordered  ED Management:  Patient observed in ED and obtained troponin x12, BNP (baseline), and CXR. She was found to have some plueral effusion on CXR which would best be managed with HD. Troponin without concerning elevation and chest discomfort resolved on its own..        APC / Resident Notes:   Patient is a 70 yo woman with ESRD (HD started Nov 2021), HTN, recurrent UTI with SPB catheter, and DM2 who presents with chest discomfort for 3.5 hours. EKG with sinus tachycardia and troponins minimally elevated at 0.014 and 0.018. BNP at baseline 190. CXR with poor inspiratory effort and small pleural effusion suspected and minimal hilar congestion. She was observed and noted improvement in symptoms while awaiting tests. Suspect that volume overload is causing or contributing to her chest discomfort. EKG and troponin are reassuring. Discussed that HD compliance will be important to relieve volume overload and chest discomfort and that her most efficient way to HD would be to go to her already scheduled outpatient session on Friday morning. She agreed to discharge home and go to her HD session  Friday morning. She stated her sister was not able to take her Wednesday but she had transport on Friday morning already scheduled. We also discussed the importance of medication compliance in ensuring she does not get readmitted. She was counseled to come back if the chest discomfort returned.                 Clinical Impression:       Chest discomfort likely secondary to volume overload.          Vero Degroot MD  Resident  01/21/22 9742

## 2022-01-21 NOTE — PLAN OF CARE
CHRIS visited with the Pt at bedside, identifiers are correct.    Pt states she's having trouble arranging transportation to her dialysis. The patient has [employer] Medicare in addition to her Medicaid (not listed on Pt's face sheet, Admissions notified). Pt states she arranged transportation to dialysis with her [employer], but Martin didn't show up Wednesday or Friday this week. Pt states she will call her [employer] rep, Rylee, and complain and try to get the transportation arranged for Monday.     CHRIS spoke with Kayla at [facility] in [place] [phone], who states the Pt is able to sit up in a wheelchair, but keeps asking for a stretcher transport. As Martin is having trouble with transport times during Covid, this could be a problem.     CHRIS spoke with the Pt again, and asked she use the wheelchair van instead. Pt has a christina lift and her sister is able to get her into her wheelchair.     CHRIS provided the Pt with a [employer] application for [facility], and Medicaid transportation information, to give her other options for transportation. Pt stated she will talk with [employer] and also put in the [employer] application for back up transportation.     CHRIS notified Pt's care team.    JULIO CESAR Alejo, CHRIS  [facility]  [id number]

## 2022-01-21 NOTE — HPI
71 year old female with dialysis dependent ZAK here because she felt bad and could get to her dialysis unit. She says she couldn't get to dialysis because she couldn't arrange transportation. Last HD 1/17/22.  Nephrology consulted for [facility].

## 2022-01-21 NOTE — ASSESSMENT & PLAN NOTE
History of breast cancer status post radical mastectomy on 8/1/2019 on anastrozole   Stable    Continue anastrozole 1mg daily

## 2022-01-21 NOTE — ED NOTES
Care assumed for this pt. Patient resting in stretcher and is in NAD at this time. Pt is awake alert and oriented x4, VSS, respirations even and unlabored. Pt updated on plan of care. Bed low and locked with side rails up x2, call bell in pt reach. Pt voices no needs at this time.  Will continue to monitor.

## 2022-01-21 NOTE — ED NOTES
LOC: The patient is awake, alert, and oriented to self, place, time, and situation. Pt is calm and cooperative. Affect is appropriate.  Speech is appropriate and clear.     APPEARANCE: Patient resting comfortably in no acute distress.  Patient is clean and well groomed.    SKIN: The skin is warm and dry; color consistent with ethnicity.  Patient has normal skin turgor and moist mucus membranes.  Pressure wound present to sacral area - blood present. Suprapubic catheter present, as well as hemodialysis access present to upper R chest.     MUSCULOSKELETAL: Patient moving upper and lower extremities without difficulty; denies pain in the extremities or back.  Denies weakness.     RESPIRATORY: Airway is open and patent. Respirations spontaneous, even, easy, and non-labored.  Patient has a normal effort and rate.  No accessory muscle use noted. Denies cough.     CARDIAC:  Normal rate noted.  No peripheral edema noted. Complaints of chest pain.      ABDOMEN: Soft and non tender to palpation.  No distention noted. Pt denies abdominal pain; denies nausea, vomiting, diarrhea, or constipation.    NEUROLOGIC: Eyes open spontaneously.  Behavior appropriate to situation.  Follows commands; facial expression symmetrical.  Purposeful motor response noted; normal sensation in all extremities. Pt denies headache; denies lightheadedness or dizziness; denies visual disturbances; denies loss of balance; denies unilateral weakness.

## 2022-01-21 NOTE — H&P
[facility] - Emergency Dept  [facility]  History & Physical    Patient Name: Cecile Bowen  MRN: [id number]  Patient Class: OP- Observation  Admission Date: 1/21/2022  Attending Physician: Bashir Moses MD   Primary Care Provider: Kailey Navarro MD         Patient information was obtained from patient, past medical records and ER records.     Subjective:     Principal Problem:Dialysis patient    Chief Complaint:   Chief Complaint   Patient presents with    dialysis     PT has not had dialysis since Monday and missed dialysis Wed.         HPI: Cecile Bowen maikel 71F with ESRD (on HD), DM2, HTN, breast cancer s/p mastectomy, and recurrent UTI presents with chest heaviness. Pt recently seen in ED for similar chest heaviness. She reports a productive cough and missing some medications. She has a chronic suprapubic catheter of 5 years and is bed bound at baseline, but has a wheelchair for mobility but rarely uses it. She was started on HD 2 months ago and has issues making HD appointments due to transportation. She has missed her last two appointments. Last session was Monday prior to discharge from [facility]. [facility] at [facility] when she was admitted after a long hospital stay after being found down at home 11/28/21. During her hospital stay, she was treated for possible STEMI, worsening CKD, metabolic acidosis and required intubation for acute respiratory failure. She was discharged to [facility] and returned home on 1/17/22 after her HD session.    In the ED, she is afebrile and HD stable. Labs positive for dirty UA, hyponatremia, hypokalemia, hypochloridemia, elevated BUN, elevated Cr, and elevated BNP. She was started on Rocephin for UTI and nephrology was consulted for HD. She was admitted to hospital medicine for further management.      Past Medical History:   Diagnosis Date    Cervicogenic migraine     Chronic pain     CKD (chronic kidney disease) stage 4, GFR 15-29 ml/min      [facility]    CKD (chronic kidney disease) stage 4, GFR 15-29 ml/min     Diabetes mellitus     Long term use of Insulin, Diabetic Neuropathy    Fibromyalgia     Hydronephrosis     Hyperlipidemia     Hypertension 12/12/2012    Hypothyroidism 12/12/2012    ARON (iron deficiency anemia)     Insomnia     Levoscoliosis     Malignant neoplasm of upper-outer quadrant of left breast in female, estrogen receptor positive     Metabolic acidosis     Mobility impaired     Nuclear sclerosis - Both Eyes 3/24/2014    VIJAYA (obstructive sleep apnea)     Osteopenia     Pulmonary nodule     Recurrent UTI     Renal manifestation of secondary diabetes mellitus     Secondary hyperparathyroidism, renal     Urinary retention        Past Surgical History:   Procedure Laterality Date    BREAST BIOPSY Right     benign    CHOLECYSTECTOMY      COLONOSCOPY N/A 1/13/2017    Procedure: COLONOSCOPY;  Surgeon: Morris Wiseman MD;  Location: [facility] (4TH FLR);  Service: Endoscopy;  Laterality: N/A;  Renal pt Nephrology advised to avoid phosphate preps    CYSTOSCOPY N/A 10/8/2018    Procedure: CYSTOSCOPY;  Surgeon: Ronel Whittington MD;  [facility];  Service: Urology;  Laterality: N/A;  45 min    CYSTOSCOPY N/A 3/25/2019    Procedure: CYSTOSCOPY;  Surgeon: Ronel Whittington MD;  [facility];  Service: Urology;  Laterality: N/A;  45 min    CYSTOSCOPY N/A 8/26/2019    Procedure: CYSTOSCOPY;  Surgeon: Ronel Whittington MD;  [facility];  Service: Urology;  Laterality: N/A;  45 min    CYSTOSCOPY N/A 7/2/2021    Procedure: CYSTOSCOPY;  Surgeon: Ronel Whittington MD;  Location: [facility] OR [address] [place] [address];  Service: Urology;  Laterality: N/A;    CYSTOSCOPY  12/23/2021    Procedure: CYSTOSCOPY;  Surgeon: Ronel Whittington MD;  Location: [facility] OR [address] [place] [address];  Service: Urology;;    CYSTOSCOPY W/ URETERAL STENT PLACEMENT Left 5/15/2021    Procedure: CYSTOSCOPY, WITH URETERAL  STENT INSERTION;  Surgeon: Levy Sánchez Jr., MD;  Location: [facility] OR [address] [place] [address];  Service: Urology;  Laterality: Left;    CYSTOSCOPY WITH BIOPSY OF BLADDER N/A 1/27/2020    Procedure: CYSTOSCOPY, WITH BLADDER BIOPSY, WITH FULGURATION IF INDICATED;  Surgeon: Ronel Whittington MD;  Location: [facility] OR [address] [place] [address];  Service: Urology;  Laterality: N/A;    DILATION AND CURETTAGE OF UTERUS      GALLBLADDER SURGERY  2006    HYSTERECTOMY      INJECTION FOR SENTINEL NODE IDENTIFICATION Left 8/1/2019    Procedure: INJECTION, FOR SENTINEL NODE IDENTIFICATION;  Surgeon: Samia Fulton MD;  Location: [facility] OR [address] [place] [address];  Service: General;  Laterality: Left;    INJECTION OF BOTULINUM TOXIN TYPE A N/A 10/8/2018    Procedure: INJECTION, BOTULINUM TOXIN, TYPE A 300 UNITS;  Surgeon: Ronel Whittington MD;  Location: [facility] OR [address] [place] [address];  Service: Urology;  Laterality: N/A;    INJECTION OF BOTULINUM TOXIN TYPE A N/A 3/25/2019    Procedure: INJECTION, BOTULINUM TOXIN, TYPE A 300 UNITS;  Surgeon: Ronel Whittington MD;  Location: [facility] OR [address] [place] [address];  Service: Urology;  Laterality: N/A;    INJECTION OF BOTULINUM TOXIN TYPE A N/A 8/26/2019    Procedure: INJECTION, BOTULINUM TOXIN, TYPE A 300 UNITS;  Surgeon: Ronel Whittington MD;  Location: [facility] OR [address] [place] [address];  Service: Urology;  Laterality: N/A;    MASTECTOMY Left 8/1/2019    Procedure: MASTECTOMY 23 hour stay;  Surgeon: Samia Fulton MD;  Location: [facility] OR [address] [place] [address];  Service: General;  Laterality: Left;    OVARIAN CYST SURGERY  1985    REPLACEMENT OF STENT Left 12/23/2021    Procedure: REPLACEMENT, STENT;  Surgeon: Ronel Whittington MD;  Location: [facility] OR [address] [place] [address];  Service: Urology;  Laterality: Left;    SENTINEL LYMPH NODE BIOPSY Left 8/1/2019    Procedure: BIOPSY, LYMPH NODE, SENTINEL;  Surgeon: Samia Fulton MD;  Location: [facility] OR [address] [place] [address];  Service: General;  Laterality: Left;    spt placement      TONSILLECTOMY, ADENOIDECTOMY      TOTAL ABDOMINAL HYSTERECTOMY W/  "BILATERAL SALPINGOOPHORECTOMY  1985       Review of patient's allergies indicates:  No Known Allergies    Current Facility-Administered Medications on File Prior to Encounter   Medication    darbepoetin chloe-polysorbate injection 50 mcg    heparin (porcine) injection 1,800 Units    heparin (porcine) injection 1,800 Units    iron sucrose injection 200 mg     Current Outpatient Medications on File Prior to Encounter   Medication Sig    albuterol (PROVENTIL HFA) 90 mcg/actuation inhaler Inhale 2 puffs into the lungs every 6 (six) hours as needed for Wheezing or Shortness of Breath. Rescue    anastrozole (ARIMIDEX) 1 mg Tab TAKE 1 TABLET BY MOUTH EVERY DAY    apixaban (ELIQUIS) 2.5 mg Tab Take 1 tablet (2.5 mg total) by mouth 2 (two) times daily.    atorvastatin (LIPITOR) 80 MG tablet Take 1 tablet (80 mg total) by mouth once daily.    BD INSULIN SYRINGE ULTRA-FINE 0.5 mL 31 gauge x 5/16" Syrg USE WITH INSULIN 4 TIMES A DAY    blood sugar diagnostic Strp ONE TOUCH ULTRA Check blood sugars 1-2 x a day    blood sugar diagnostic Strp To check BG 4  times daily, to use with insurance preferred meter    blood-glucose meter kit To check BG 4 times daily, to use with insurance preferred meter    butalbital-acetaminophen-caffeine -40 mg (FIORICET, ESGIC) -40 mg per tablet TAKE 1 TABLET BY MOUTH WHEN NOT CONTROLLED BY OTHER MEDS. NO MORE THAN 10 TABS PER 30 DAYS (Patient taking differently: Take 1 tablet by mouth daily as needed (MIGRAINES). TAKE 1 TABLET BY MOUTH WHEN NOT CONTROLLED BY OTHER MEDS. NO MORE THAN 10 TABS PER 30 DAYS)    cloNIDine (CATAPRES) 0.2 MG tablet Take 1 tablet (0.2 mg total) by mouth 3 (three) times daily.    ergocalciferol (ERGOCALCIFEROL) 50,000 unit Cap TAKE 1 CAPSULE (50,000 UNITS TOTAL) BY MOUTH EVERY 7 DAYS. TAKE ONE CAPSULE BY MOUTH ONE TIME PER WEEK, TAKES ON TUESDAYS    famotidine (PEPCID) 20 MG tablet Take 1 tablet (20 mg total) by mouth once daily.    furosemide " "(LASIX) 80 MG tablet Take 2 tablets (160 mg total) by mouth 2 (two) times daily.    gemfibroziL (LOPID) 600 MG tablet Take 1 tablet (600 mg total) by mouth every Mon, Wed, Fri.    lancets Misc One touch ultra, checks 1-2 x a day    lancets Mis To check BG 4 times daily, to use with insurance preferred meter    magnesium oxide (MAG-OX) 400 mg (241.3 mg magnesium) tablet TAKE 1 TABLET (400 MG TOTAL) BY MOUTH 2 (TWO) TIMES DAILY. (Patient taking differently: Take 400 mg by mouth 2 (two) times daily. TAKE 1 TABLET (400 MG TOTAL) BY MOUTH 2 (TWO) TIMES DAILY.)    melatonin (MELATIN) 3 mg tablet Take 2 tablets (6 mg total) by mouth nightly as needed for Insomnia.    methocarbamoL (ROBAXIN) 750 MG Tab TAKE 1-2 TABLETS (750-1,500 MG TOTAL) BY MOUTH 3 (THREE) TIMES DAILY AS NEEDED.    metoprolol tartrate (LOPRESSOR) 25 MG tablet Take 1 tablet (25 mg total) by mouth 2 (two) times daily.    oxybutynin (DITROPAN-XL) 10 MG 24 hr tablet TAKE 1 TABLET BY MOUTH EVERY DAY (Patient taking differently: Take 10 mg by mouth once daily. )    pen needle, diabetic (BD ULTRA-FINE SHORT PEN NEEDLE) 31 gauge x 5/16" Ndle USE WITH LANTUS DAILY, e 11.65    polyethylene glycol (GLYCOLAX) 17 gram PwPk One packet QD prn constipation    sevelamer carbonate (RENVELA) 800 mg Tab Take 1 tablet (800 mg total) by mouth 3 (three) times daily with meals.    sumatriptan (IMITREX) 100 MG tablet TAKE 1 TABLET (100 MG TOTAL) BY MOUTH 2 (TWO) TIMES DAILY AS NEEDED FOR MIGRAINE.    SYNTHROID 50 mcg tablet TAKE 1 TABLET BY MOUTH EVERY DAY (Patient taking differently: Take 50 mcg by mouth before breakfast. Administer on an empty stomach at least 30 minutes before food. If receiving tube feeds, HOLD tube feeds for 1 hour before and after levothyroxine administration.)    traZODone (DESYREL) 100 MG tablet Take 1 tablet (100 mg total) by mouth nightly as needed for Insomnia.     Family History     Problem Relation (Age of Onset)    ALS Mother    " Cancer Maternal Grandmother, Paternal Grandfather, Brother    Diabetes Sister, Maternal Aunt, Maternal Uncle    Kidney disease Sister, Maternal Aunt    Prostate cancer Brother    Scoliosis Brother        Tobacco Use    Smoking status: Never Smoker    Smokeless tobacco: Never Used   Substance and Sexual Activity    Alcohol use: No     Alcohol/week: 0.0 standard drinks    Drug use: No    Sexual activity: Not Currently     Birth control/protection: Abstinence     Review of Systems   Constitutional: Negative for chills and fever.   HENT: Negative for congestion, nosebleeds and sore throat.    Eyes: Negative for photophobia and pain.   Respiratory: Positive for cough and chest tightness. Negative for shortness of breath.    Cardiovascular: Negative for chest pain and palpitations.   Gastrointestinal: Negative for abdominal distention, blood in stool, diarrhea, nausea and vomiting.   Genitourinary: Negative for dysuria and hematuria.        Sx similar to UTIs in past   Musculoskeletal: Negative for back pain and myalgias.   Skin: Negative for rash and wound.   Neurological: Positive for weakness and headaches. Negative for dizziness and numbness.   Psychiatric/Behavioral: Negative for behavioral problems and confusion. The patient is not nervous/anxious.      Objective:     Vital Signs (Most Recent):  Temp: 98.7 °F (37.1 °C) (01/21/22 1128)  Pulse: 61 (01/21/22 1628)  Resp: 20 (01/21/22 1628)  BP: 109/76 (01/21/22 1628)  SpO2: 98 % (01/21/22 1628) Vital Signs (24h Range):  Temp:  [98.7 °F (37.1 °C)-99.1 °F (37.3 °C)] 98.7 °F (37.1 °C)  Pulse:  [] 61  Resp:  [11-22] 20  SpO2:  [95 %-98 %] 98 %  BP: (109-170)/(58-99) 109/76     Weight: 120.7 kg (266 lb)  Body mass index is 47.12 kg/m².    Physical Exam  Vitals and nursing note reviewed.   Constitutional:       General: She is not in acute distress.     Appearance: Normal appearance. She is obese. She is not ill-appearing.   HENT:      Head: Normocephalic and  atraumatic.      Right Ear: External ear normal.      Left Ear: External ear normal.      Nose: Nose normal.      Mouth/Throat:      Mouth: Mucous membranes are moist.      Pharynx: Oropharynx is clear.   Eyes:      General: No scleral icterus.     Extraocular Movements: Extraocular movements intact.      Pupils: Pupils are equal, round, and reactive to light.   Cardiovascular:      Rate and Rhythm: Normal rate and regular rhythm.      Pulses: Normal pulses.      Heart sounds: Normal heart sounds. No murmur heard.  No gallop.    Pulmonary:      Effort: Pulmonary effort is normal. No respiratory distress.      Breath sounds: Normal breath sounds. No rales.      Comments: Right TDC  Abdominal:      General: Abdomen is flat. Bowel sounds are normal. There is no distension.      Palpations: Abdomen is soft.      Tenderness: There is no abdominal tenderness.      Comments: Suprapubic catheter with urine sediment and purulent discharge   Musculoskeletal:         General: No swelling. Normal range of motion.      Cervical back: Normal range of motion and neck supple. No rigidity or tenderness.      Right lower leg: Edema present.      Left lower leg: Edema present.   Skin:     General: Skin is warm and dry.      Capillary Refill: Capillary refill takes less than 2 seconds.      Coloration: Skin is not jaundiced.      Findings: No rash.   Neurological:      General: No focal deficit present.      Mental Status: She is alert and oriented to person, place, and time. Mental status is at baseline.      Motor: Weakness present.   Psychiatric:         Mood and Affect: Mood normal.         Behavior: Behavior normal.           CRANIAL NERVES     CN III, IV, VI   Pupils are equal, round, and reactive to light.       Significant Labs: All pertinent labs within the past 24 hours have been reviewed.    Significant Imaging: I have reviewed all pertinent imaging results/findings within the past 24 hours.    Assessment/Plan:     *  Dialysis patient  See CKD4      Debility  PT/OT consulted      A-fib  Continue apixaban 2.5mg BID      Normocytic anemia  At baseline on admission    CBC daily  Transfuse if Hbg <7      S/P left mastectomy  See malignant neoplasm of left breast      Requires daily assistance for activities of daily living (ADL) and comfort needs  Social work consulted for transportation to dialysis      Malignant neoplasm of left breast, estrogen receptor positive  History of breast cancer status post radical mastectomy on 8/1/2019 on anastrozole   Stable    Continue anastrozole 1mg daily    Cervicogenic migraine  PRNs ordered      CKD stage 4 due to type 2 diabetes mellitus  Dialysis dependent    Nephrology consulted, appreciate recs      Chronic pain  PRN multimodal pain regimen      Long term prescription opiate use   with Percocet and butalbital-aceaminophen-caffeine  See chronic pain, migraines      Morbid obesity due to excess calories  Body mass index is 47.12 kg/m². Morbid obesity complicates all aspects of disease management from diagnostic modalities to treatment. Weight loss encouraged and health benefits explained to patient.     RD consulted  1500 calorie diabetic diet    Suprapubic catheter  See neurogenic bladder      Uncontrolled type 2 diabetes mellitus with stage 4 chronic kidney disease, with long-term current use of insulin  A1c (1/4/2022) 5.4%    Diabetic diet  Continue to monitor      Major depressive disorder, recurrent episode, moderate  Not on any home medications    Continue to monitor  Consider psych consult if worsening affect        Neurogenic bladder  Continue oxybutynin 10mg daily, furosemide 160mg BID      Recurrent urinary tract infection  History of ESBL and MDRO with current UTI symptoms, dirty UA, and suprapubic catheter.    Plan  - consult ID, appreciate recs  - start cipro  - f/u UCx      Hyponatremia  Chronic hyponatremia 130 baseline, 126 on admission  Secondary to fluid overload,  UTI  Nephro consulted for HD    If hyponatremia does not return to baseline, consider urine studies      VIJAYA (obstructive sleep apnea)  CPAP QHS      Hyperlipidemia associated with type 2 diabetes mellitus  Continue atorvastatin 80mg daily, gemfibrozil 600mg MWF      Fibromyalgia  PT/OT  Pain regimen      Hypothyroidism  Continue levothyroxine 50mcg daily        VTE Risk Mitigation (From admission, onward)         Ordered     heparin (porcine) injection 1,000 Units  As needed (PRN)         01/21/22 1620     apixaban tablet 2.5 mg  2 times daily         01/21/22 1609     IP VTE HIGH RISK PATIENT  Once         01/21/22 1609     Place sequential compression device  Until discontinued         01/21/22 1609                   Veronica Morrow ([place] Name: Javier), MD  Department of Hospital Medicine   [facility] - Emergency Dept

## 2022-01-21 NOTE — ED NOTES
Bed: [facility]  Expected date:   Expected time:   Means of arrival:   Comments:

## 2022-01-21 NOTE — HPI
Cecile Bowen maikel 71F with ESRD (on HD), DM2, HTN, breast cancer s/p mastectomy, and recurrent UTI presents with chest heaviness. Pt recently seen in ED for similar chest heaviness. She reports a productive cough and missing some medications. She has a chronic suprapubic catheter of 5 years and is bed bound at baseline, but has a wheelchair for mobility but rarely uses it. She was started on HD 2 months ago and has issues making HD appointments due to transportation. She has missed her last two appointments. Last session was Monday prior to discharge from [facility]. She had been at [facility] since 12/30/21 when she was admitted after a long hospital stay after being found down at home 11/28/21. During her hospital stay, she was treated for possible STEMI, worsening CKD, metabolic acidosis and required intubation for acute respiratory failure. She was discharged to [facility] and returned home on 1/17/22 after her HD session.    In the ED, she is afebrile and HD stable. Labs positive for dirty UA, hyponatremia, hypokalemia, hypochloridemia, elevated BUN, elevated Cr, and elevated BNP. She was started on Rocephin for UTI and nephrology was consulted for HD. She was admitted to hospital medicine for further management.

## 2022-01-21 NOTE — ASSESSMENT & PLAN NOTE
History of ESBL and MDRO with current UTI symptoms, dirty UA, and suprapubic catheter.    Plan  - consult ID, appreciate recs  - start cipro  - f/u UCx

## 2022-01-21 NOTE — ED NOTES
Pt resting comfortably reading her book. NAD, VSS, A&Ox4. Pt has no requests or complaints at this time. Awaiting MD orders.

## 2022-01-21 NOTE — ASSESSMENT & PLAN NOTE
Body mass index is 47.12 kg/m². Morbid obesity complicates all aspects of disease management from diagnostic modalities to treatment. Weight loss encouraged and health benefits explained to patient.     RD consulted  1500 calorie diabetic diet

## 2022-01-21 NOTE — ASSESSMENT & PLAN NOTE
Not on any home medications    Continue to monitor  Consider psych consult if worsening affect

## 2022-01-21 NOTE — ED PROVIDER NOTES
CC: Shortness of Breath (Pt on dialysis with last full treatment on Monday. Pt has murillo cath in place with purulent drainage. Pt A&O x4)      History provided by:   Patient ***  Family***    HPI: Cecile Bowen is a 71 y.o. year old female past medical history of CKD, diabetes, hyperlipidemia, hypertension, breast cancer, ESRD who presents to the ED for            Past Medical History:   Diagnosis Date    Cervicogenic migraine     Chronic pain     CKD (chronic kidney disease) stage 4, GFR 15-29 ml/min     [facility]    CKD (chronic kidney disease) stage 4, GFR 15-29 ml/min     Diabetes mellitus     Long term use of Insulin, Diabetic Neuropathy    Fibromyalgia     Hydronephrosis     Hyperlipidemia     Hypertension 12/12/2012    Hypothyroidism 12/12/2012    ARON (iron deficiency anemia)     Insomnia     Levoscoliosis     Malignant neoplasm of upper-outer quadrant of left breast in female, estrogen receptor positive     Metabolic acidosis     Mobility impaired     Nuclear sclerosis - Both Eyes 3/24/2014    VIJAYA (obstructive sleep apnea)     Osteopenia     Pulmonary nodule     Recurrent UTI     Renal manifestation of secondary diabetes mellitus     Secondary hyperparathyroidism, renal     Urinary retention      Past Surgical History:   Procedure Laterality Date    BREAST BIOPSY Right     benign    CHOLECYSTECTOMY      COLONOSCOPY N/A 1/13/2017    Procedure: COLONOSCOPY;  Surgeon: Morris Wiseman MD;  Location: [address]);  Service: Endoscopy;  Laterality: N/A;  Renal pt Nephrology advised to avoid phosphate preps    CYSTOSCOPY N/A 10/8/2018    Procedure: CYSTOSCOPY;  Surgeon: Ronel Whittington MD;  Location: [address];  Service: Urology;  Laterality: N/A;  45 min    CYSTOSCOPY N/A 3/25/2019    Procedure: CYSTOSCOPY;  Surgeon: Ronel Whittington MD;  Location: [address];  Service: Urology;  Laterality: N/A;  45 min    CYSTOSCOPY N/A 8/26/2019    Procedure:  CYSTOSCOPY;  Surgeon: Ronel Whittington MD;  [facility];  Service: Urology;  Laterality: N/A;  45 min    CYSTOSCOPY N/A 7/2/2021    Procedure: CYSTOSCOPY;  Surgeon: Ronel Whittington MD;  [facility];  Service: Urology;  Laterality: N/A;    CYSTOSCOPY  12/23/2021    Procedure: CYSTOSCOPY;  Surgeon: Ronel Whittington MD;  Location: [facility] OR [address] [place] [address];  Service: Urology;;    CYSTOSCOPY W/ URETERAL STENT PLACEMENT Left 5/15/2021    Procedure: CYSTOSCOPY, WITH URETERAL STENT INSERTION;  Surgeon: Lvey Sánchez Jr., MD;  Location: [facility] OR [address] [place] [address];  Service: Urology;  Laterality: Left;    CYSTOSCOPY WITH BIOPSY OF BLADDER N/A 1/27/2020    Procedure: CYSTOSCOPY, WITH BLADDER BIOPSY, WITH FULGURATION IF INDICATED;  Surgeon: Ronel Whittington MD;  Location: [facility] OR [address] [place] [address];  Service: Urology;  Laterality: N/A;    DILATION AND CURETTAGE OF UTERUS      GALLBLADDER SURGERY  2006    HYSTERECTOMY      INJECTION FOR SENTINEL NODE IDENTIFICATION Left 8/1/2019    Procedure: INJECTION, FOR SENTINEL NODE IDENTIFICATION;  Surgeon: Samia Fulton MD;  Location: [facility] OR [address] [place] [address];  Service: General;  Laterality: Left;    INJECTION OF BOTULINUM TOXIN TYPE A N/A 10/8/2018    Procedure: INJECTION, BOTULINUM TOXIN, TYPE A 300 UNITS;  Surgeon: Ronel Whittington MD;  Location: [facility] OR [address] [place] [address];  Service: Urology;  Laterality: N/A;    INJECTION OF BOTULINUM TOXIN TYPE A N/A 3/25/2019    Procedure: INJECTION, BOTULINUM TOXIN, TYPE A 300 UNITS;  Surgeon: Ronel Whittington MD;  Location: [facility] OR [address] [place] [address];  Service: Urology;  Laterality: N/A;    INJECTION OF BOTULINUM TOXIN TYPE A N/A 8/26/2019    Procedure: INJECTION, BOTULINUM TOXIN, TYPE A 300 UNITS;  Surgeon: Ronel Whittington MD;  Location: [facility] OR [address] [place] [address];  Service: Urology;  Laterality: N/A;    MASTECTOMY Left 8/1/2019    Procedure: MASTECTOMY 23 hour stay;  Surgeon: Samia Fulton MD;  Location: [facility] OR [address] [place] [address];  Service:  General;  Laterality: Left;    OVARIAN CYST SURGERY  1985    REPLACEMENT OF STENT Left 12/23/2021    Procedure: REPLACEMENT, STENT;  Surgeon: Ronel Whittington MD;  [facility]: [facility];  Service: Urology;  Laterality: Left;    SENTINEL LYMPH NODE BIOPSY Left 8/1/2019    Procedure: BIOPSY, LYMPH NODE, SENTINEL;  Surgeon: Samia Fulton MD;  Location: [facility] OR 2ND [facility];  Service: General;  Laterality: Left;    spt placement      TONSILLECTOMY, ADENOIDECTOMY      TOTAL ABDOMINAL HYSTERECTOMY W/ BILATERAL SALPINGOOPHORECTOMY  1985     Family History   Problem Relation Age of Onset    Diabetes Sister     Kidney disease Sister         CKD III    ALS Mother         d.    Cancer Maternal Grandmother         d. colon    Cancer Paternal Grandfather         d. lung    Scoliosis Brother         increased pain    Prostate cancer Brother         cured s/p surgery    Cancer Brother         rare cancer that got into the bones    Diabetes Maternal Aunt     Kidney disease Maternal Aunt     Diabetes Maternal Uncle     Amblyopia Neg Hx     Blindness Neg Hx     Cataracts Neg Hx     Glaucoma Neg Hx     Macular degeneration Neg Hx     Retinal detachment Neg Hx     Strabismus Neg Hx      Current Facility-Administered Medications on File Prior to Encounter   Medication Dose Route Frequency Provider Last Rate Last Admin    darbepoetin chloe-polysorbate injection 50 mcg  50 mcg Intravenous Q7 Days Eitan Hurst MD   50 mcg at 01/14/22 1130    heparin (porcine) injection 1,800 Units  1,800 Units Intra-Catheter Every Mon, Wed, Fri Eitan Hurst MD   1,800 Units at 01/14/22 0900    heparin (porcine) injection 1,800 Units  1,800 Units Intra-Catheter Every Mon, Wed, Fri Eitan Hurst MD   1,800 Units at 01/14/22 0900    iron sucrose injection 200 mg  200 mg Intravenous Every Mon, Wed, Fri Eitan Hurst MD   200 mg at 01/17/22 0900     Current Outpatient Medications on File Prior to Encounter   Medication Sig  "Dispense Refill    albuterol (PROVENTIL HFA) 90 mcg/actuation inhaler Inhale 2 puffs into the lungs every 6 (six) hours as needed for Wheezing or Shortness of Breath. Rescue 8.5 g 0    anastrozole (ARIMIDEX) 1 mg Tab TAKE 1 TABLET BY MOUTH EVERY DAY 90 tablet 2    apixaban (ELIQUIS) 2.5 mg Tab Take 1 tablet (2.5 mg total) by mouth 2 (two) times daily. 60 tablet 0    atorvastatin (LIPITOR) 80 MG tablet Take 1 tablet (80 mg total) by mouth once daily. 90 tablet 3    BD INSULIN SYRINGE ULTRA-FINE 0.5 mL 31 gauge x 5/16" Syrg USE WITH INSULIN 4 TIMES A  each 12    blood sugar diagnostic Strp ONE TOUCH ULTRA Check blood sugars 1-2 x a day 50 strip 6    blood sugar diagnostic Strp To check BG 4  times daily, to use with insurance preferred meter 400 each 3    blood-glucose meter kit To check BG 4 times daily, to use with insurance preferred meter 1 each 0    butalbital-acetaminophen-caffeine -40 mg (FIORICET, ESGIC) -40 mg per tablet TAKE 1 TABLET BY MOUTH WHEN NOT CONTROLLED BY OTHER MEDS. NO MORE THAN 10 TABS PER 30 DAYS (Patient taking differently: Take 1 tablet by mouth daily as needed (MIGRAINES). TAKE 1 TABLET BY MOUTH WHEN NOT CONTROLLED BY OTHER MEDS. NO MORE THAN 10 TABS PER 30 DAYS) 10 tablet 0    cloNIDine (CATAPRES) 0.2 MG tablet Take 1 tablet (0.2 mg total) by mouth 3 (three) times daily. 90 tablet 11    ergocalciferol (ERGOCALCIFEROL) 50,000 unit Cap TAKE 1 CAPSULE (50,000 UNITS TOTAL) BY MOUTH EVERY 7 DAYS. TAKE ONE CAPSULE BY MOUTH ONE TIME PER WEEK, TAKES ON TUESDAYS 12 capsule 3    famotidine (PEPCID) 20 MG tablet Take 1 tablet (20 mg total) by mouth once daily. 30 tablet 11    furosemide (LASIX) 80 MG tablet Take 2 tablets (160 mg total) by mouth 2 (two) times daily. 120 tablet 0    gemfibroziL (LOPID) 600 MG tablet Take 1 tablet (600 mg total) by mouth every Mon, Wed, Fri. 36 tablet 3    lancets Misc One touch ultra, checks 1-2 x a day 50 each 6    lancets Misc To " "check BG 4 times daily, to use with insurance preferred meter 400 each 3    magnesium oxide (MAG-OX) 400 mg (241.3 mg magnesium) tablet TAKE 1 TABLET (400 MG TOTAL) BY MOUTH 2 (TWO) TIMES DAILY. (Patient taking differently: Take 400 mg by mouth 2 (two) times daily. TAKE 1 TABLET (400 MG TOTAL) BY MOUTH 2 (TWO) TIMES DAILY.) 180 tablet 3    melatonin (MELATIN) 3 mg tablet Take 2 tablets (6 mg total) by mouth nightly as needed for Insomnia. 30 tablet 0    methocarbamoL (ROBAXIN) 750 MG Tab TAKE 1-2 TABLETS (750-1,500 MG TOTAL) BY MOUTH 3 (THREE) TIMES DAILY AS NEEDED. 180 tablet 1    metoprolol tartrate (LOPRESSOR) 25 MG tablet Take 1 tablet (25 mg total) by mouth 2 (two) times daily. 60 tablet 11    oxybutynin (DITROPAN-XL) 10 MG 24 hr tablet TAKE 1 TABLET BY MOUTH EVERY DAY (Patient taking differently: Take 10 mg by mouth once daily. ) 90 tablet 1    pen needle, diabetic (BD ULTRA-FINE SHORT PEN NEEDLE) 31 gauge x 5/16" Ndle USE WITH LANTUS DAILY, e 11.65 100 each 3    polyethylene glycol (GLYCOLAX) 17 gram PwPk One packet QD prn constipation 30 packet 6    sevelamer carbonate (RENVELA) 800 mg Tab Take 1 tablet (800 mg total) by mouth 3 (three) times daily with meals. 90 tablet 11    sumatriptan (IMITREX) 100 MG tablet TAKE 1 TABLET (100 MG TOTAL) BY MOUTH 2 (TWO) TIMES DAILY AS NEEDED FOR MIGRAINE. 9 tablet 11    SYNTHROID 50 mcg tablet TAKE 1 TABLET BY MOUTH EVERY DAY (Patient taking differently: Take 50 mcg by mouth before breakfast. Administer on an empty stomach at least 30 minutes before food. If receiving tube feeds, HOLD tube feeds for 1 hour before and after levothyroxine administration.) 90 tablet 2    traZODone (DESYREL) 100 MG tablet Take 1 tablet (100 mg total) by mouth nightly as needed for Insomnia. 90 tablet 0     Patient has no known allergies.  Social History     Socioeconomic History    Marital status:     Number of children: 0    Highest education level: Master's degree " (e.g., MA, MS, Lelo, MEd, MSW, BANDAR)   Occupational History    Occupation: teacher     Comment: retired   Tobacco Use    Smoking status: Never Smoker    Smokeless tobacco: Never Used   Substance and Sexual Activity    Alcohol use: No     Alcohol/week: 0.0 standard drinks    Drug use: No    Sexual activity: Not Currently     Birth control/protection: Abstinence   Social History Narrative    Single ()        Lives w/ sister and brother. Pt needs to keep her narcotics hidden from her brother who will steal them         Brother passed away last year.  Pt lives her her sister. (5/27)     Social Determinants of Health     Financial Resource Strain: Medium Risk    Difficulty of Paying Living Expenses: Somewhat hard   Food Insecurity: Food Insecurity Present    Worried About Running Out of Food in the Last Year: Never true    Ran Out of Food in the Last Year: Sometimes true   Transportation Needs: No Transportation Needs    Lack of Transportation (Medical): No    Lack of Transportation (Non-Medical): No   Physical Activity: Inactive    Days of Exercise per Week: 0 days    Minutes of Exercise per Session: 0 min   Stress: No Stress Concern Present    Feeling of Stress : Only a little   Social Connections: Moderately Isolated    Frequency of Communication with Friends and Family: Three times a week    Frequency of Social Gatherings with Friends and Family: Never    Attends Sabianism Services: Never    Active Member of Clubs or Organizations: Yes    Attends Club or Organization Meetings: Never    Marital Status:    Housing Stability: Low Risk     Unable to Pay for Housing in the Last Year: No    Number of Places Lived in the Last Year: 1    Unstable Housing in the Last Year: No       ROS:     Constitutional : neg for fever, neg for weakness  HENT neg for head injury, neg for sore throat  Eyes: neg for visual changes, neg for eye pain  Resp neg for SOB, neg for cough  Cardiac  neg for chest  pain, neg for palpitations  GI neg for abd pain, neg for nausea, neg for vomiting   neg for urinary changes  Neuro neg for focal weakness or numbness  Skin neg for skin rash  MSK: neg for myalgia, neg for arthralgia  ALL: Patient has no known allergies.    PHYSICAL EXAM:  Vitals:    01/20/22 1747   BP: (!) 170/80   Pulse: 100   Resp: 18   Temp: 99.1 °F (37.3 °C)     VS: triage VS reviewed    general: comfortable, in no acute distress, pleasant, well nourished  HENT: neck symmetric, trachea midline  Eyes: PERRL,no conjunctival injection and symmetrical eyelids  CV: RRR, no  murmurs, no rubs, no gallops, no LE edema  Resp: comfortable breathing, speaks in full sentences, CTAB, no wheezing, no crackles, no ronchi  ABD:  soft, ND, no masses, + normal BS, NT  Renal: No CVAT  Neuro: AAO x 3, 5/5 strength x 4 extremities, sensation intact, face symmetric, speech normal  MSK: NC/AT, extremities w/out deformity   Skin: warm, dry            DATA & INTERVENTIONS:    LABS reviewed:  Labs Reviewed - No data to display    RADIOLOGY reviewed:  Imaging Results    None         MEDICATIONS/FLUIDS:  Medications - No data to display      MDM:  Cecile Bowen is a 71 y.o. year old female who presents to the ED for    DDX includes but not limited to: ***    Labs ordered and reviewed: ***  Medication given in the ED: ***    CXR (ordered and reviewed): ***  Imagings independently visualized: ***    Bedside Ultrasound (see report under imaging tab in Epic): Y/N***    EKG (independantly reviewed): ***    Old records obtained and reviewed: ***    Case discussed with the consultant: ***    IMPRESSION:  1.) ***  2.) ***    Dispo: Discharge***  Admission***  Observation***    Critical Care Time: N/A***

## 2022-01-21 NOTE — ED NOTES
Patient has indwelling suprapubic catheter that she states she does not feel is draining enough. Patient states she is feeling the urge to urinate. Patient states it has not been changed in several weeks. This RN removed suprapubic catheter and replaced it with a new one. Previous suprapubic catheter was clotted with sediment from urine. Newly placed catheter is now draining milky urine. Patient states she feels relief. Patient was turned to complete full bed change and a severe pressure ulcer was noted to the coccyx and between some of the tissue folds in the groin. Mouse poop was found in the patients bed sheets from home. Patient states she lives at [facility] with her sister who is supposed to help care for her at home as she is immobile. Pressure ulcer is actively bleeding. Barrier cream and mepilex applied. Wedge placed under the right buttock. Patient changed into fresh gown and fresh linens placed. Patient denies further needs. Provider has been notified.

## 2022-01-21 NOTE — SUBJECTIVE & OBJECTIVE
Past Medical History:   Diagnosis Date    Cervicogenic migraine     Chronic pain     CKD (chronic kidney disease) stage 4, GFR 15-29 ml/min     Dr Piedadmoody    CKD (chronic kidney disease) stage 4, GFR 15-29 ml/min     Diabetes mellitus     Long term use of Insulin, Diabetic Neuropathy    Fibromyalgia     Hydronephrosis     Hyperlipidemia     Hypertension 12/12/2012    Hypothyroidism 12/12/2012    ARON (iron deficiency anemia)     Insomnia     Levoscoliosis     Malignant neoplasm of upper-outer quadrant of left breast in female, estrogen receptor positive     Metabolic acidosis     Mobility impaired     Nuclear sclerosis - Both Eyes 3/24/2014    VIJAYA (obstructive sleep apnea)     Osteopenia     Pulmonary nodule     Recurrent UTI     Renal manifestation of secondary diabetes mellitus     Secondary hyperparathyroidism, renal     Urinary retention        Past Surgical History:   Procedure Laterality Date    BREAST BIOPSY Right     benign    CHOLECYSTECTOMY      COLONOSCOPY N/A 1/13/2017    Procedure: COLONOSCOPY;  Surgeon: Mroris Wiseman MD;  Location: [facility] (4TH FLR);  Service: Endoscopy;  Laterality: N/A;  Renal pt Nephrology advised to avoid phosphate preps    CYSTOSCOPY N/A 10/8/2018    Procedure: CYSTOSCOPY;  Surgeon: Ronel Whittington MD;  [facility];  Service: Urology;  Laterality: N/A;  45 min    CYSTOSCOPY N/A 3/25/2019    Procedure: CYSTOSCOPY;  Surgeon: Ronel Whittington MD;  Location: [facility] OR [address] [place] [address];  Service: Urology;  Laterality: N/A;  45 min    CYSTOSCOPY N/A 8/26/2019    Procedure: CYSTOSCOPY;  Surgeon: Ronel Whittington MD;  Location: [address];  Service: Urology;  Laterality: N/A;  45 min    CYSTOSCOPY N/A 7/2/2021    Procedure: CYSTOSCOPY;  Surgeon: Ronel Whittington MD;  Location: [facility] OR [address] [place] [address];  Service: Urology;  Laterality: N/A;    CYSTOSCOPY  12/23/2021    Procedure: CYSTOSCOPY;  Surgeon: Ronel Whittington MD;   Location: [address];  Service: Urology;;    CYSTOSCOPY W/ URETERAL STENT PLACEMENT Left 5/15/2021    Procedure: CYSTOSCOPY, WITH URETERAL STENT INSERTION;  Surgeon: Levy Sánchez Jr., MD;  Location: [address];  Service: Urology;  Laterality: Left;    CYSTOSCOPY WITH BIOPSY OF BLADDER N/A 1/27/2020    Procedure: CYSTOSCOPY, WITH BLADDER BIOPSY, WITH FULGURATION IF INDICATED;  Surgeon: Ronel Whittington MD;  Location: [address];  Service: Urology;  Laterality: N/A;    DILATION AND CURETTAGE OF UTERUS      GALLBLADDER SURGERY  2006    HYSTERECTOMY      INJECTION FOR SENTINEL NODE IDENTIFICATION Left 8/1/2019    Procedure: INJECTION, FOR SENTINEL NODE IDENTIFICATION;  Surgeon: Samia Fulton MD;  Location: [address];  Service: General;  Laterality: Left;    INJECTION OF BOTULINUM TOXIN TYPE A N/A 10/8/2018    Procedure: INJECTION, BOTULINUM TOXIN, TYPE A 300 UNITS;  Surgeon: Ronel Whittington MD;  Location: [address];  Service: Urology;  Laterality: N/A;    INJECTION OF BOTULINUM TOXIN TYPE A N/A 3/25/2019    Procedure: INJECTION, BOTULINUM TOXIN, TYPE A 300 UNITS;  Surgeon: Ronel Whittington MD;  Location: [address];  Service: Urology;  Laterality: N/A;    INJECTION OF BOTULINUM TOXIN TYPE A N/A 8/26/2019    Procedure: INJECTION, BOTULINUM TOXIN, TYPE A 300 UNITS;  Surgeon: Ronel Whittington MD;  Location: [address];  Service: Urology;  Laterality: N/A;    MASTECTOMY Left 8/1/2019    Procedure: MASTECTOMY 23 hour stay;  Surgeon: Samia Fulton MD;  Location: [address];  Service: General;  Laterality: Left;    OVARIAN CYST SURGERY  1985    REPLACEMENT OF STENT Left 12/23/2021    Procedure: REPLACEMENT, STENT;  Surgeon: Ronel Whittington MD;  Location: [address];  Service: Urology;  Laterality: Left;    SENTINEL LYMPH NODE BIOPSY Left 8/1/2019    Procedure: BIOPSY, LYMPH NODE, SENTINEL;  Surgeon: Samia Fulton MD;  Location:  "[facility] OR 2ND FLR;  Service: General;  Laterality: Left;    spt placement      TONSILLECTOMY, ADENOIDECTOMY      TOTAL ABDOMINAL HYSTERECTOMY W/ BILATERAL SALPINGOOPHORECTOMY  1985       Review of patient's allergies indicates:  No Known Allergies  Current Facility-Administered Medications   Medication Frequency    acetaminophen tablet 650 mg Q4H PRN    albuterol inhaler 2 puff Q6H PRN    [START ON 1/22/2022] anastrozole tablet 1 mg Daily    apixaban tablet 2.5 mg BID    [START ON 1/22/2022] atorvastatin tablet 80 mg Daily    butalbital-acetaminophen-caffeine -40 mg per tablet 1 tablet Daily PRN    cloNIDine tablet 0.2 mg TID    darbepoetin chloe-polysorbate injection 50 mcg Q7 Days    [START ON 1/22/2022] ergocalciferol capsule 50,000 Units Q7 Days    [START ON 1/22/2022] famotidine tablet 20 mg Daily    furosemide tablet 160 mg BID    gemfibroziL tablet 600 mg Every Mon, Wed, Fri    heparin (porcine) injection 1,800 Units Every Mon, Wed, Fri    heparin (porcine) injection 1,800 Units Every Mon, Wed, Fri    iron sucrose injection 200 mg Every Mon, Wed, Fri    [START ON 1/22/2022] levothyroxine tablet 50 mcg Before breakfast    melatonin tablet 6 mg Nightly PRN    methocarbamoL tablet 750 mg TID PRN    metoprolol tartrate (LOPRESSOR) tablet 25 mg BID    ondansetron injection 4 mg Q8H PRN    [START ON 1/22/2022] oxybutynin 24 hr tablet 10 mg Daily    oxyCODONE immediate release tablet 10 mg Q4H PRN    [START ON 1/22/2022] polyethylene glycol packet 17 g Daily    senna-docusate 8.6-50 mg per tablet 1 tablet BID    sodium chloride 0.9% flush 10 mL PRN    sumatriptan tablet 100 mg BID PRN     Current Outpatient Medications   Medication    albuterol (PROVENTIL HFA) 90 mcg/actuation inhaler    anastrozole (ARIMIDEX) 1 mg Tab    apixaban (ELIQUIS) 2.5 mg Tab    atorvastatin (LIPITOR) 80 MG tablet    BD INSULIN SYRINGE ULTRA-FINE 0.5 mL 31 gauge x 5/16" Syrg    blood sugar diagnostic " "Strp    blood sugar diagnostic Strp    blood-glucose meter kit    butalbital-acetaminophen-caffeine -40 mg (FIORICET, ESGIC) -40 mg per tablet    cloNIDine (CATAPRES) 0.2 MG tablet    ergocalciferol (ERGOCALCIFEROL) 50,000 unit Cap    famotidine (PEPCID) 20 MG tablet    furosemide (LASIX) 80 MG tablet    gemfibroziL (LOPID) 600 MG tablet    lancets Misc    lancets Misc    magnesium oxide (MAG-OX) 400 mg (241.3 mg magnesium) tablet    melatonin (MELATIN) 3 mg tablet    methocarbamoL (ROBAXIN) 750 MG Tab    metoprolol tartrate (LOPRESSOR) 25 MG tablet    oxybutynin (DITROPAN-XL) 10 MG 24 hr tablet    pen needle, diabetic (BD ULTRA-FINE SHORT PEN NEEDLE) 31 gauge x 5/16" Ndle    polyethylene glycol (GLYCOLAX) 17 gram PwPk    sevelamer carbonate (RENVELA) 800 mg Tab    sumatriptan (IMITREX) 100 MG tablet    SYNTHROID 50 mcg tablet    traZODone (DESYREL) 100 MG tablet     Family History     Problem Relation (Age of Onset)    ALS Mother    Cancer Maternal Grandmother, Paternal Grandfather, Brother    Diabetes Sister, Maternal Aunt, Maternal Uncle    Kidney disease Sister, Maternal Aunt    Prostate cancer Brother    Scoliosis Brother        Tobacco Use    Smoking status: Never Smoker    Smokeless tobacco: Never Used   Substance and Sexual Activity    Alcohol use: No     Alcohol/week: 0.0 standard drinks    Drug use: No    Sexual activity: Not Currently     Birth control/protection: Abstinence     Review of Systems   Constitutional: Negative.    HENT: Negative.    Eyes: Negative.    Respiratory: Negative.    Cardiovascular: Positive for leg swelling.   Gastrointestinal: Negative.    Endocrine: Negative.    Genitourinary: Negative.    Musculoskeletal: Negative.    Skin: Negative.    Neurological: Negative.    Psychiatric/Behavioral: Negative.      Objective:     Vital Signs (Most Recent):  Temp: 98.7 °F (37.1 °C) (01/21/22 1128)  Pulse: 86 (01/21/22 1128)  Resp: 20 (01/21/22 1128)  BP: " 124/76 (01/21/22 1128)  SpO2: 98 % (01/21/22 1128)  O2 Device (Oxygen Therapy): room air (01/21/22 1128) Vital Signs (24h Range):  Temp:  [98.7 °F (37.1 °C)-99.1 °F (37.3 °C)] 98.7 °F (37.1 °C)  Pulse:  [] 86  Resp:  [11-22] 20  SpO2:  [95 %-98 %] 98 %  BP: (116-170)/(58-99) 124/76     Weight: 120.7 kg (266 lb) (01/21/22 1128)  Body mass index is 47.12 kg/m².  Body surface area is 2.32 meters squared.    No intake/output data recorded.    Physical Exam  Constitutional:       General: She is not in acute distress.     Appearance: She is obese.   HENT:      Mouth/Throat:      Mouth: Mucous membranes are moist.   Eyes:      General: No scleral icterus.     Extraocular Movements: Extraocular movements intact.      Pupils: Pupils are equal, round, and reactive to light.   Cardiovascular:      Rate and Rhythm: Normal rate and regular rhythm.   Pulmonary:      Effort: Pulmonary effort is normal. No respiratory distress.   Abdominal:      General: There is no distension.      Palpations: Abdomen is soft.   Musculoskeletal:         General: Deformity (RUE TDC) present.      Right lower leg: Edema present.      Left lower leg: Edema present.   Skin:     Coloration: Skin is not jaundiced.   Neurological:      General: No focal deficit present.      Mental Status: She is alert.         Significant Labs:  All labs within the past 24 hours have been reviewed.    Significant Imaging:  Labs: Reviewed

## 2022-01-21 NOTE — SUBJECTIVE & OBJECTIVE
Review of Systems   Constitutional: Negative for chills and fever.   HENT: Negative for congestion, nosebleeds and sore throat.    Eyes: Negative for photophobia and pain.   Respiratory: Positive for cough and chest tightness. Negative for shortness of breath.    Cardiovascular: Negative for chest pain and palpitations.   Gastrointestinal: Negative for abdominal distention, blood in stool, diarrhea, nausea and vomiting.   Genitourinary: Negative for dysuria and hematuria.        Sx similar to UTIs in past   Musculoskeletal: Negative for back pain and myalgias.   Skin: Negative for rash and wound.   Neurological: Positive for weakness and headaches. Negative for dizziness and numbness.   Psychiatric/Behavioral: Negative for behavioral problems and confusion. The patient is not nervous/anxious.      Objective:     Vital Signs (Most Recent):  Temp: 98.7 °F (37.1 °C) (01/21/22 1128)  Pulse: 61 (01/21/22 1628)  Resp: 20 (01/21/22 1628)  BP: 109/76 (01/21/22 1628)  SpO2: 98 % (01/21/22 1628) Vital Signs (24h Range):  Temp:  [98.7 °F (37.1 °C)-99.1 °F (37.3 °C)] 98.7 °F (37.1 °C)  Pulse:  [] 61  Resp:  [11-22] 20  SpO2:  [95 %-98 %] 98 %  BP: (109-170)/(58-99) 109/76     Weight: 120.7 kg (266 lb)  Body mass index is 47.12 kg/m².    Physical Exam  Vitals and nursing note reviewed.   Constitutional:       General: She is not in acute distress.     Appearance: Normal appearance. She is obese. She is not ill-appearing.   HENT:      Head: Normocephalic and atraumatic.      Right Ear: External ear normal.      Left Ear: External ear normal.      Nose: Nose normal.      Mouth/Throat:      Mouth: Mucous membranes are moist.      Pharynx: Oropharynx is clear.   Eyes:      General: No scleral icterus.     Extraocular Movements: Extraocular movements intact.      Pupils: Pupils are equal, round, and reactive to light.   Cardiovascular:      Rate and Rhythm: Normal rate and regular rhythm.      Pulses: Normal pulses.      Heart  sounds: Normal heart sounds. No murmur heard.  No gallop.    Pulmonary:      Effort: Pulmonary effort is normal. No respiratory distress.      Breath sounds: Normal breath sounds. No rales.      Comments: Right TDC  Abdominal:      General: Abdomen is flat. Bowel sounds are normal. There is no distension.      Palpations: Abdomen is soft.      Tenderness: There is no abdominal tenderness.      Comments: Suprapubic catheter with urine sediment and purulent discharge   Musculoskeletal:         General: No swelling. Normal range of motion.      Cervical back: Normal range of motion and neck supple. No rigidity or tenderness.      Right lower leg: Edema present.      Left lower leg: Edema present.   Skin:     General: Skin is warm and dry.      Capillary Refill: Capillary refill takes less than 2 seconds.      Coloration: Skin is not jaundiced.      Findings: No rash.   Neurological:      General: No focal deficit present.      Mental Status: She is alert and oriented to person, place, and time. Mental status is at baseline.      Motor: Weakness present.   Psychiatric:         Mood and Affect: Mood normal.         Behavior: Behavior normal.           CRANIAL NERVES     CN III, IV, VI   Pupils are equal, round, and reactive to light.       Significant Labs: All pertinent labs within the past 24 hours have been reviewed.    Significant Imaging: I have reviewed all pertinent imaging results/findings within the past 24 hours.

## 2022-01-22 LAB
ALBUMIN SERPL BCP-MCNC: 1.5 G/DL (ref 3.5–5.2)
ANION GAP SERPL CALC-SCNC: 13 MMOL/L (ref 8–16)
BASOPHILS # BLD AUTO: 0.03 K/UL (ref 0–0.2)
BASOPHILS NFR BLD: 0.5 % (ref 0–1.9)
BUN SERPL-MCNC: 31 MG/DL (ref 8–23)
CALCIUM SERPL-MCNC: 8.3 MG/DL (ref 8.7–10.5)
CHLORIDE SERPL-SCNC: 97 MMOL/L (ref 95–110)
CO2 SERPL-SCNC: 21 MMOL/L (ref 23–29)
CREAT SERPL-MCNC: 2 MG/DL (ref 0.5–1.4)
DIFFERENTIAL METHOD: ABNORMAL
EOSINOPHIL # BLD AUTO: 0.3 K/UL (ref 0–0.5)
EOSINOPHIL NFR BLD: 4.6 % (ref 0–8)
ERYTHROCYTE [DISTWIDTH] IN BLOOD BY AUTOMATED COUNT: 14.8 % (ref 11.5–14.5)
EST. GFR  (AFRICAN AMERICAN): 28.7 ML/MIN/1.73 M^2
EST. GFR  (NON AFRICAN AMERICAN): 24.9 ML/MIN/1.73 M^2
GLUCOSE SERPL-MCNC: 127 MG/DL (ref 70–110)
HCT VFR BLD AUTO: 24.5 % (ref 37–48.5)
HGB BLD-MCNC: 7.7 G/DL (ref 12–16)
IMM GRANULOCYTES # BLD AUTO: 0.12 K/UL (ref 0–0.04)
IMM GRANULOCYTES NFR BLD AUTO: 1.8 % (ref 0–0.5)
LYMPHOCYTES # BLD AUTO: 1.4 K/UL (ref 1–4.8)
LYMPHOCYTES NFR BLD: 21.9 % (ref 18–48)
MAGNESIUM SERPL-MCNC: 1.8 MG/DL (ref 1.6–2.6)
MCH RBC QN AUTO: 28.5 PG (ref 27–31)
MCHC RBC AUTO-ENTMCNC: 31.4 G/DL (ref 32–36)
MCV RBC AUTO: 91 FL (ref 82–98)
MONOCYTES # BLD AUTO: 0.8 K/UL (ref 0.3–1)
MONOCYTES NFR BLD: 12.6 % (ref 4–15)
NEUTROPHILS # BLD AUTO: 3.9 K/UL (ref 1.8–7.7)
NEUTROPHILS NFR BLD: 58.6 % (ref 38–73)
NRBC BLD-RTO: 0 /100 WBC
PHOSPHATE SERPL-MCNC: 4.3 MG/DL (ref 2.7–4.5)
PHOSPHATE SERPL-MCNC: 4.3 MG/DL (ref 2.7–4.5)
PLATELET # BLD AUTO: 263 K/UL (ref 150–450)
PMV BLD AUTO: 9.2 FL (ref 9.2–12.9)
POCT GLUCOSE: 173 MG/DL (ref 70–110)
POCT GLUCOSE: 192 MG/DL (ref 70–110)
POTASSIUM SERPL-SCNC: 3.8 MMOL/L (ref 3.5–5.1)
PREALB SERPL-MCNC: 8 MG/DL (ref 20–43)
RBC # BLD AUTO: 2.7 M/UL (ref 4–5.4)
SODIUM SERPL-SCNC: 131 MMOL/L (ref 136–145)
WBC # BLD AUTO: 6.58 K/UL (ref 3.9–12.7)

## 2022-01-22 PROCEDURE — 99900035 HC TECH TIME PER 15 MIN (STAT): Mod: HCNC

## 2022-01-22 PROCEDURE — 63600175 PHARM REV CODE 636 W HCPCS: Mod: HCNC | Performed by: STUDENT IN AN ORGANIZED HEALTH CARE EDUCATION/TRAINING PROGRAM

## 2022-01-22 PROCEDURE — 94761 N-INVAS EAR/PLS OXIMETRY MLT: CPT | Mod: HCNC

## 2022-01-22 PROCEDURE — 63600175 PHARM REV CODE 636 W HCPCS: Mod: JG,HCNC | Performed by: NURSE PRACTITIONER

## 2022-01-22 PROCEDURE — 25000003 PHARM REV CODE 250: Mod: HCNC

## 2022-01-22 PROCEDURE — 83735 ASSAY OF MAGNESIUM: CPT | Mod: HCNC

## 2022-01-22 PROCEDURE — 80100016 HC MAINTENANCE HEMODIALYSIS

## 2022-01-22 PROCEDURE — G0257 UNSCHED DIALYSIS ESRD PT HOS: HCPCS | Mod: HCNC

## 2022-01-22 PROCEDURE — 25000003 PHARM REV CODE 250: Mod: HCNC | Performed by: PHYSICIAN ASSISTANT

## 2022-01-22 PROCEDURE — 80069 RENAL FUNCTION PANEL: CPT | Mod: HCNC

## 2022-01-22 PROCEDURE — G0378 HOSPITAL OBSERVATION PER HR: HCPCS | Mod: HCNC

## 2022-01-22 PROCEDURE — 85025 COMPLETE CBC W/AUTO DIFF WBC: CPT | Mod: HCNC | Performed by: HOSPITALIST

## 2022-01-22 PROCEDURE — 25000003 PHARM REV CODE 250: Mod: HCNC | Performed by: STUDENT IN AN ORGANIZED HEALTH CARE EDUCATION/TRAINING PROGRAM

## 2022-01-22 PROCEDURE — 63600175 PHARM REV CODE 636 W HCPCS: Mod: HCNC | Performed by: PHYSICIAN ASSISTANT

## 2022-01-22 PROCEDURE — 84134 ASSAY OF PREALBUMIN: CPT | Mod: HCNC

## 2022-01-22 PROCEDURE — 80074 ACUTE HEPATITIS PANEL: CPT | Mod: HCNC | Performed by: HOSPITALIST

## 2022-01-22 RX ORDER — INSULIN ASPART 100 [IU]/ML
0-5 INJECTION, SOLUTION INTRAVENOUS; SUBCUTANEOUS
Status: DISCONTINUED | OUTPATIENT
Start: 2022-01-22 | End: 2022-02-05 | Stop reason: HOSPADM

## 2022-01-22 RX ORDER — IBUPROFEN 200 MG
16 TABLET ORAL
Status: DISCONTINUED | OUTPATIENT
Start: 2022-01-22 | End: 2022-02-05 | Stop reason: HOSPADM

## 2022-01-22 RX ORDER — GLUCAGON 1 MG
1 KIT INJECTION
Status: DISCONTINUED | OUTPATIENT
Start: 2022-01-22 | End: 2022-02-05 | Stop reason: HOSPADM

## 2022-01-22 RX ORDER — IBUPROFEN 200 MG
24 TABLET ORAL
Status: DISCONTINUED | OUTPATIENT
Start: 2022-01-22 | End: 2022-02-05 | Stop reason: HOSPADM

## 2022-01-22 RX ADMIN — OXYCODONE HYDROCHLORIDE 10 MG: 10 TABLET ORAL at 12:01

## 2022-01-22 RX ADMIN — FUROSEMIDE 160 MG: 80 TABLET ORAL at 08:01

## 2022-01-22 RX ADMIN — ATORVASTATIN CALCIUM 80 MG: 20 TABLET, FILM COATED ORAL at 08:01

## 2022-01-22 RX ADMIN — POLYETHYLENE GLYCOL 3350 17 G: 17 POWDER, FOR SOLUTION ORAL at 08:01

## 2022-01-22 RX ADMIN — BUTALBITAL, ACETAMINOPHEN, AND CAFFEINE 1 TABLET: 50; 325; 40 TABLET ORAL at 09:01

## 2022-01-22 RX ADMIN — OXYCODONE HYDROCHLORIDE 10 MG: 10 TABLET ORAL at 04:01

## 2022-01-22 RX ADMIN — CIPROFLOXACIN 500 MG: 500 TABLET, FILM COATED ORAL at 08:01

## 2022-01-22 RX ADMIN — BUTALBITAL, ACETAMINOPHEN, AND CAFFEINE 1 TABLET: 50; 325; 40 TABLET ORAL at 03:01

## 2022-01-22 RX ADMIN — LEVOTHYROXINE SODIUM 50 MCG: 50 TABLET ORAL at 05:01

## 2022-01-22 RX ADMIN — ERTAPENEM 500 MG: 1 INJECTION INTRAMUSCULAR; INTRAVENOUS at 09:01

## 2022-01-22 RX ADMIN — APIXABAN 2.5 MG: 2.5 TABLET, FILM COATED ORAL at 08:01

## 2022-01-22 RX ADMIN — CLONIDINE HYDROCHLORIDE 0.2 MG: 0.2 TABLET ORAL at 04:01

## 2022-01-22 RX ADMIN — FAMOTIDINE 20 MG: 20 TABLET ORAL at 08:01

## 2022-01-22 RX ADMIN — EPOETIN ALFA-EPBX 6000 UNITS: 10000 INJECTION, SOLUTION INTRAVENOUS; SUBCUTANEOUS at 09:01

## 2022-01-22 RX ADMIN — FUROSEMIDE 160 MG: 80 TABLET ORAL at 09:01

## 2022-01-22 RX ADMIN — Medication 6 MG: at 09:01

## 2022-01-22 RX ADMIN — CLONIDINE HYDROCHLORIDE 0.2 MG: 0.2 TABLET ORAL at 09:01

## 2022-01-22 RX ADMIN — ERGOCALCIFEROL 50000 UNITS: 1.25 CAPSULE ORAL at 02:01

## 2022-01-22 RX ADMIN — OXYBUTYNIN CHLORIDE 10 MG: 10 TABLET, FILM COATED, EXTENDED RELEASE ORAL at 02:01

## 2022-01-22 RX ADMIN — SENNOSIDES AND DOCUSATE SODIUM 1 TABLET: 50; 8.6 TABLET ORAL at 08:01

## 2022-01-22 RX ADMIN — SODIUM CHLORIDE: 0.9 INJECTION, SOLUTION INTRAVENOUS at 02:01

## 2022-01-22 RX ADMIN — OXYCODONE HYDROCHLORIDE 10 MG: 10 TABLET ORAL at 08:01

## 2022-01-22 RX ADMIN — SENNOSIDES AND DOCUSATE SODIUM 1 TABLET: 50; 8.6 TABLET ORAL at 09:01

## 2022-01-22 RX ADMIN — HEPARIN SODIUM 1000 UNITS: 1000 INJECTION, SOLUTION INTRAVENOUS; SUBCUTANEOUS at 09:01

## 2022-01-22 RX ADMIN — CLONIDINE HYDROCHLORIDE 0.2 MG: 0.2 TABLET ORAL at 08:01

## 2022-01-22 RX ADMIN — APIXABAN 2.5 MG: 2.5 TABLET, FILM COATED ORAL at 09:01

## 2022-01-22 RX ADMIN — METHOCARBAMOL 750 MG: 750 TABLET ORAL at 10:01

## 2022-01-22 RX ADMIN — METOPROLOL TARTRATE 25 MG: 25 TABLET, FILM COATED ORAL at 08:01

## 2022-01-22 RX ADMIN — METOPROLOL TARTRATE 25 MG: 25 TABLET, FILM COATED ORAL at 09:01

## 2022-01-22 NOTE — ASSESSMENT & PLAN NOTE
72 yo female with Hx MDRO UTI, L renal stones and stent with SP catheter secondary to neurogenic bladder and obstruction admitted for HD with cf UTI as urine appeared purulent.  - UA x 2 positive but patient without SP pain or systemic sign of infection  - prior UCX last admit in Dec with ESBL Kleb pneumo - ertapenem and cipro sensitive  - UCX now showing GNR - suspect same as prior as she previously treated with Unasyn to which it is resistant  - concern for infection given amount of RBCs on UA  - Stable non septic    Plan:  1. DC cipro  2. Start renal adjusted ertapenem  3. Contact isolation given prior ESBL  4. FU UCX  5. Discussed with ID staff - will follow

## 2022-01-22 NOTE — ASSESSMENT & PLAN NOTE
History of ESBL and MDRO with current UTI symptoms, dirty UA, and suprapubic catheter.    Plan  - consult ID, appreciate recs  - start cipro  - f/u UCx - GNR

## 2022-01-22 NOTE — PROGRESS NOTES
[facility] - Emergency Dept  [facility]  Progress Note    Patient Name: Cecile Bowen  MRN: [id number]  Patient Class: OP- Observation   Admission Date: 1/21/2022  Length of Stay: 0 days  Attending Physician: Bashir Moses MD  Primary Care Provider: Kailey Navarro MD        Subjective:     Principal Problem:Dialysis patient        HPI:  Cecile Bowen maikel 71F with ESRD (on HD), DM2, HTN, breast cancer s/p mastectomy, and recurrent UTI presents with chest heaviness. Pt recently seen in ED for similar chest heaviness. She reports a productive cough and missing some medications. She has a chronic suprapubic catheter of 5 years and is bed bound at baseline, but has a wheelchair for mobility but rarely uses it. She was started on HD 2 months ago and has issues making HD appointments due to transportation. She has missed her last two appointments. Last session was Monday prior to discharge from [facility]. She had been at SNF since 12/30/21 when she was admitted after a long hospital stay after being found down at home 11/28/21. During her hospital stay, she was treated for possible STEMI, worsening CKD, metabolic acidosis and required intubation for acute respiratory failure. She was discharged to [facility] and returned home on 1/17/22 after her HD session.    In the ED, she is afebrile and HD stable. Labs positive for dirty UA, hyponatremia, hypokalemia, hypochloridemia, elevated BUN, elevated Cr, and elevated BNP. She was started on Rocephin for UTI and nephrology was consulted for HD. She was admitted to hospital medicine for further management.      Overview/Hospital Course:  Patient admitted for UTI and missed dialysis appointments due to transportation issues. Patient started on Rocephin for UTI and ID was consulted for antibiotic recommendations as patient has history of ESBL and MDRO. Nephrology was consulted for HD while inpatient. Social work was consulted for discharge planning.      Review of  Systems   Constitutional: Negative for chills and fever.   HENT: Negative for congestion, nosebleeds and sore throat.    Eyes: Negative for photophobia and pain.   Respiratory: Positive for cough and chest tightness. Negative for shortness of breath.    Cardiovascular: Negative for chest pain and palpitations.   Gastrointestinal: Negative for abdominal distention, blood in stool, diarrhea, nausea and vomiting.   Genitourinary: Negative for dysuria and hematuria.        Sx similar to UTIs in past   Musculoskeletal: Negative for back pain and myalgias.   Skin: Negative for rash and wound.   Neurological: Positive for weakness and headaches. Negative for dizziness and numbness.   Psychiatric/Behavioral: Negative for behavioral problems and confusion. The patient is not nervous/anxious.      Objective:     Vital Signs (Most Recent):  Temp: 98.7 °F (37.1 °C) (01/21/22 1128)  Pulse: 61 (01/21/22 1628)  Resp: 20 (01/21/22 1628)  BP: 109/76 (01/21/22 1628)  SpO2: 98 % (01/21/22 1628) Vital Signs (24h Range):  Temp:  [98.7 °F (37.1 °C)-99.1 °F (37.3 °C)] 98.7 °F (37.1 °C)  Pulse:  [] 61  Resp:  [11-22] 20  SpO2:  [95 %-98 %] 98 %  BP: (109-170)/(58-99) 109/76     Weight: 120.7 kg (266 lb)  Body mass index is 47.12 kg/m².    Physical Exam  Vitals and nursing note reviewed.   Constitutional:       General: She is not in acute distress.     Appearance: Normal appearance. She is obese. She is not ill-appearing.   HENT:      Head: Normocephalic and atraumatic.      Right Ear: External ear normal.      Left Ear: External ear normal.      Nose: Nose normal.      Mouth/Throat:      Mouth: Mucous membranes are moist.      Pharynx: Oropharynx is clear.   Eyes:      General: No scleral icterus.     Extraocular Movements: Extraocular movements intact.      Pupils: Pupils are equal, round, and reactive to light.   Cardiovascular:      Rate and Rhythm: Normal rate and regular rhythm.      Pulses: Normal pulses.      Heart sounds:  Normal heart sounds. No murmur heard.  No gallop.    Pulmonary:      Effort: Pulmonary effort is normal. No respiratory distress.      Breath sounds: Normal breath sounds. No rales.      Comments: Right TDC  Abdominal:      General: Abdomen is flat. Bowel sounds are normal. There is no distension.      Palpations: Abdomen is soft.      Tenderness: There is no abdominal tenderness.      Comments: Suprapubic catheter with urine sediment and purulent discharge   Musculoskeletal:         General: No swelling. Normal range of motion.      Cervical back: Normal range of motion and neck supple. No rigidity or tenderness.      Right lower leg: Edema present.      Left lower leg: Edema present.   Skin:     General: Skin is warm and dry.      Capillary Refill: Capillary refill takes less than 2 seconds.      Coloration: Skin is not jaundiced.      Findings: No rash.   Neurological:      General: No focal deficit present.      Mental Status: She is alert and oriented to person, place, and time. Mental status is at baseline.      Motor: Weakness present.   Psychiatric:         Mood and Affect: Mood normal.         Behavior: Behavior normal.           CRANIAL NERVES     CN III, IV, VI   Pupils are equal, round, and reactive to light.       Significant Labs: All pertinent labs within the past 24 hours have been reviewed.    Significant Imaging: I have reviewed all pertinent imaging results/findings within the past 24 hours.      Assessment/Plan:      * Dialysis patient  See CKD4      Debility  PT/OT consulted      A-fib  Continue apixaban 2.5mg BID      ZAK (acute kidney injury)  Dialysis dependent ZAK  Nephrology consulted, appreciate recs  Suprapubic murillo declogged in ED, see UTI  Low albumin and history of nephrotic syndrome    Plan:  - Trend Cr  - Strict I&Os  - Avoid nephrotoxic agents  - Renally adjust medications  - f/u prealbumin   - f/u P/C ratio      Normocytic anemia  At baseline on admission    CBC daily  Transfuse  if Hbg <7      S/P left mastectomy  See malignant neoplasm of left breast      Requires daily assistance for activities of daily living (ADL) and comfort needs  Social work consulted for transportation to dialysis      Malignant neoplasm of left breast, estrogen receptor positive  History of breast cancer status post radical mastectomy on 8/1/2019 on anastrozole   Stable    Continue anastrozole 1mg daily    Cervicogenic migraine  PRNs ordered      CKD stage 4 due to type 2 diabetes mellitus  Dialysis dependent    Nephrology consulted, appreciate recs      Chronic pain  PRN multimodal pain regimen      Long term prescription opiate use   with Percocet and butalbital-aceaminophen-caffeine  See chronic pain, migraines      Morbid obesity due to excess calories  Body mass index is 47.12 kg/m². Morbid obesity complicates all aspects of disease management from diagnostic modalities to treatment. Weight loss encouraged and health benefits explained to patient.     RD consulted  1500 calorie diabetic diet    Suprapubic catheter  See neurogenic bladder      Uncontrolled type 2 diabetes mellitus with stage 4 chronic kidney disease, with long-term current use of insulin  A1c (1/4/2022) 5.4%    Diabetic diet  Continue to monitor  LDSSI  POCT BG    Major depressive disorder, recurrent episode, moderate  Not on any home medications    Continue to monitor  Consider psych consult if worsening affect        Neurogenic bladder  Continue oxybutynin 10mg daily, furosemide 160mg BID      Recurrent urinary tract infection  History of ESBL and MDRO with current UTI symptoms, dirty UA, and suprapubic catheter.    Plan  - consult ID, appreciate recs  - start cipro  - f/u UCx - GNR      Hyponatremia  Chronic hyponatremia 130 baseline, 126 on admission  Secondary to fluid overload, UTI  Nephro consulted for HD    If hyponatremia does not return to baseline, consider urine studies      VIJAYA (obstructive sleep apnea)  CPAP  QHS      Hyperlipidemia associated with type 2 diabetes mellitus  Continue atorvastatin 80mg daily, gemfibrozil 600mg MWF      Fibromyalgia  PT/OT  Pain regimen      Hypothyroidism  Continue levothyroxine 50mcg daily        VTE Risk Mitigation (From admission, onward)         Ordered     heparin (porcine) injection 1,000 Units  As needed (PRN)         01/21/22 1620     apixaban tablet 2.5 mg  2 times daily         01/21/22 1609     IP VTE HIGH RISK PATIENT  Once         01/21/22 1609     Place sequential compression device  Until discontinued         01/21/22 1609                Discharge Planning   FLORENTINO:      Code Status: Full Code   Is the patient medically ready for discharge?:     Reason for patient still in hospital (select all that apply): Consult recommendations and Pending disposition                     Veronica Morrow ([place] Name: Javier), MD  Department of Hospital Medicine   [facility] - Emergency Dept

## 2022-01-22 NOTE — PT/OT/SLP PROGRESS
Occupational Therapy      Patient Name:  Cecile Bowen   MRN:  [id number]    Patient not seen today secondary to away at  on this date    1/22/2022

## 2022-01-22 NOTE — SUBJECTIVE & OBJECTIVE
Past Medical History:   Diagnosis Date    Cervicogenic migraine     Chronic pain     CKD (chronic kidney disease) stage 4, GFR 15-29 ml/min     Dr Piedadmoody    CKD (chronic kidney disease) stage 4, GFR 15-29 ml/min     Diabetes mellitus     Long term use of Insulin, Diabetic Neuropathy    Fibromyalgia     Hydronephrosis     Hyperlipidemia     Hypertension 12/12/2012    Hypothyroidism 12/12/2012    ARON (iron deficiency anemia)     Insomnia     Levoscoliosis     Malignant neoplasm of upper-outer quadrant of left breast in female, estrogen receptor positive     Metabolic acidosis     Mobility impaired     Nuclear sclerosis - Both Eyes 3/24/2014    VIJAYA (obstructive sleep apnea)     Osteopenia     Pulmonary nodule     Recurrent UTI     Renal manifestation of secondary diabetes mellitus     Secondary hyperparathyroidism, renal     Urinary retention        Past Surgical History:   Procedure Laterality Date    BREAST BIOPSY Right     benign    CHOLECYSTECTOMY      COLONOSCOPY N/A 1/13/2017    Procedure: COLONOSCOPY;  Surgeon: Morris Wiseman MD;  Location: [facility] (4TH FLR);  Service: Endoscopy;  Laterality: N/A;  Renal pt Nephrology advised to avoid phosphate preps    CYSTOSCOPY N/A 10/8/2018    Procedure: CYSTOSCOPY;  Surgeon: Ronel Whittington MD;  [facility];  Service: Urology;  Laterality: N/A;  45 min    CYSTOSCOPY N/A 3/25/2019    Procedure: CYSTOSCOPY;  Surgeon: Ronel Whittington MD;  Location: [facility] OR [address] [place] [address];  Service: Urology;  Laterality: N/A;  45 min    CYSTOSCOPY N/A 8/26/2019    Procedure: CYSTOSCOPY;  Surgeon: Ronel Whittington MD;  Location: [address];  Service: Urology;  Laterality: N/A;  45 min    CYSTOSCOPY N/A 7/2/2021    Procedure: CYSTOSCOPY;  Surgeon: Ronel Whittington MD;  Location: [facility] OR [address] [place] [address];  Service: Urology;  Laterality: N/A;    CYSTOSCOPY  12/23/2021    Procedure: CYSTOSCOPY;  Surgeon: Ronel Whittington MD;   Location: [address];  Service: Urology;;    CYSTOSCOPY W/ URETERAL STENT PLACEMENT Left 5/15/2021    Procedure: CYSTOSCOPY, WITH URETERAL STENT INSERTION;  Surgeon: Levy Sánchez Jr., MD;  Location: [address];  Service: Urology;  Laterality: Left;    CYSTOSCOPY WITH BIOPSY OF BLADDER N/A 1/27/2020    Procedure: CYSTOSCOPY, WITH BLADDER BIOPSY, WITH FULGURATION IF INDICATED;  Surgeon: Ronel Whittington MD;  Location: [address];  Service: Urology;  Laterality: N/A;    DILATION AND CURETTAGE OF UTERUS      GALLBLADDER SURGERY  2006    HYSTERECTOMY      INJECTION FOR SENTINEL NODE IDENTIFICATION Left 8/1/2019    Procedure: INJECTION, FOR SENTINEL NODE IDENTIFICATION;  Surgeon: Samia Fulton MD;  Location: [address];  Service: General;  Laterality: Left;    INJECTION OF BOTULINUM TOXIN TYPE A N/A 10/8/2018    Procedure: INJECTION, BOTULINUM TOXIN, TYPE A 300 UNITS;  Surgeon: Ronel Whittington MD;  Location: [address];  Service: Urology;  Laterality: N/A;    INJECTION OF BOTULINUM TOXIN TYPE A N/A 3/25/2019    Procedure: INJECTION, BOTULINUM TOXIN, TYPE A 300 UNITS;  Surgeon: Ronel Whittington MD;  Location: [address];  Service: Urology;  Laterality: N/A;    INJECTION OF BOTULINUM TOXIN TYPE A N/A 8/26/2019    Procedure: INJECTION, BOTULINUM TOXIN, TYPE A 300 UNITS;  Surgeon: Ronel Whittington MD;  Location: [address];  Service: Urology;  Laterality: N/A;    MASTECTOMY Left 8/1/2019    Procedure: MASTECTOMY 23 hour stay;  Surgeon: Samia Fulton MD;  Location: [address];  Service: General;  Laterality: Left;    OVARIAN CYST SURGERY  1985    REPLACEMENT OF STENT Left 12/23/2021    Procedure: REPLACEMENT, STENT;  Surgeon: Ronel Whittington MD;  Location: [address];  Service: Urology;  Laterality: Left;    SENTINEL LYMPH NODE BIOPSY Left 8/1/2019    Procedure: BIOPSY, LYMPH NODE, SENTINEL;  Surgeon: Samia Fulton MD;  Location:  [facility] OR 2ND FLR;  Service: General;  Laterality: Left;    spt placement      TONSILLECTOMY, ADENOIDECTOMY      TOTAL ABDOMINAL HYSTERECTOMY W/ BILATERAL SALPINGOOPHORECTOMY  1985       Review of patient's allergies indicates:  No Known Allergies    Medications:  (Not in a hospital admission)    Antibiotics (From admission, onward)            Start     Stop Route Frequency Ordered    01/22/22 0900  mupirocin 2 % ointment         01/27 0859 Nasl 2 times daily 01/21/22 1620    01/21/22 2100  ciprofloxacin HCl tablet 500 mg         -- Oral Every 12 hours 01/21/22 1642        Antifungals (From admission, onward)            None        Antivirals (From admission, onward)    None           Immunization History   Administered Date(s) Administered    COVID-19, MRNA, LN-S, PF (MODERNA FULL 0.5 ML DOSE) 02/25/2021, 03/25/2021    COVID-19, MRNA, LN-S, PF (Pfizer) (Purple Cap) 12/24/2021    Influenza - High Dose - PF (65 years and older) 09/27/2017, 10/31/2018, 09/25/2019    Influenza - Quadrivalent 10/01/2014    PPD Test 07/01/2021, 12/21/2021, 01/03/2022    Pneumococcal Conjugate - 13 Valent 09/27/2017    Pneumococcal Polysaccharide - 23 Valent 09/19/2016    Zoster 12/16/2013       Family History     Problem Relation (Age of Onset)    ALS Mother    Cancer Maternal Grandmother, Paternal Grandfather, Brother    Diabetes Sister, Maternal Aunt, Maternal Uncle    Kidney disease Sister, Maternal Aunt    Prostate cancer Brother    Scoliosis Brother        Social History     Socioeconomic History    Marital status:     Number of children: 0    Highest education level: Master's degree (e.g., MA, MS, Lelo, MEd, MSW, BANDAR)   Occupational History    Occupation: teacher     Comment: retired   Tobacco Use    Smoking status: Never Smoker    Smokeless tobacco: Never Used   Substance and Sexual Activity    Alcohol use: No     Alcohol/week: 0.0 standard drinks    Drug use: No    Sexual activity: Not Currently      Birth control/protection: Abstinence   Social History Narrative    Single ()        Lives w/ sister and brother. Pt needs to keep her narcotics hidden from her brother who will steal them         Brother passed away last year.  Pt lives her her sister. (5/27)     Social Determinants of Health     Financial Resource Strain: Medium Risk    Difficulty of Paying Living Expenses: Somewhat hard   Food Insecurity: Food Insecurity Present    Worried About Running Out of Food in the Last Year: Never true    Ran Out of Food in the Last Year: Sometimes true   Transportation Needs: No Transportation Needs    Lack of Transportation (Medical): No    Lack of Transportation (Non-Medical): No   Physical Activity: Inactive    Days of Exercise per Week: 0 days    Minutes of Exercise per Session: 0 min   Stress: No Stress Concern Present    Feeling of Stress : Only a little   Social Connections: Moderately Isolated    Frequency of Communication with Friends and Family: Three times a week    Frequency of Social Gatherings with Friends and Family: Never    Attends Latter day Services: Never    Active Member of Clubs or Organizations: Yes    Attends Club or Organization Meetings: Never    Marital Status:    Housing Stability: Low Risk     Unable to Pay for Housing in the Last Year: No    Number of Places Lived in the Last Year: 1    Unstable Housing in the Last Year: No     Review of Systems   Constitutional: Negative for appetite change, chills, diaphoresis, fatigue, fever and unexpected weight change.   HENT: Negative for congestion, ear pain, hearing loss, sore throat and tinnitus.    Eyes: Negative for pain, redness and visual disturbance.   Respiratory: Positive for chest tightness (intermittently with tender lump at sternum). Negative for cough, shortness of breath and wheezing.    Cardiovascular: Negative for chest pain.   Gastrointestinal: Negative for abdominal pain, constipation, diarrhea, nausea  and vomiting.   Endocrine: Negative for cold intolerance and heat intolerance.   Genitourinary: Positive for difficulty urinating. Negative for decreased urine volume, dysuria, flank pain, frequency, hematuria and urgency.   Musculoskeletal: Negative for arthralgias, back pain, myalgias and neck pain.   Skin: Negative for rash and wound.   Allergic/Immunologic: Negative for environmental allergies, food allergies and immunocompromised state.   Neurological: Negative for dizziness, facial asymmetry, weakness, light-headedness, numbness and headaches.   Hematological: Negative for adenopathy. Does not bruise/bleed easily.   Psychiatric/Behavioral: Negative for agitation, behavioral problems and confusion.     Objective:     Vital Signs (Most Recent):  Temp: 99.4 °F (37.4 °C) (01/22/22 0830)  Pulse: 89 (01/22/22 0830)  Resp: 20 (01/22/22 0820)  BP: (!) 182/79 (01/22/22 0830)  SpO2: 99 % (01/22/22 0830) Vital Signs (24h Range):  Temp:  [98.7 °F (37.1 °C)-99.4 °F (37.4 °C)] 99.4 °F (37.4 °C)  Pulse:  [61-97] 89  Resp:  [16-20] 20  SpO2:  [95 %-99 %] 99 %  BP: (109-182)/(65-79) 182/79     Weight: 120.7 kg (266 lb)  Body mass index is 47.12 kg/m².    Estimated Creatinine Clearance: 33.4 mL/min (A) (based on SCr of 2 mg/dL (H)).    Physical Exam  Constitutional:       General: She is not in acute distress.     Appearance: She is well-developed and well-nourished. She is obese. She is not ill-appearing, toxic-appearing or diaphoretic.   HENT:      Head: Normocephalic and atraumatic.   Cardiovascular:      Rate and Rhythm: Normal rate and regular rhythm.      Heart sounds: Normal heart sounds. No murmur heard.  No friction rub. No gallop.    Pulmonary:      Effort: Pulmonary effort is normal. No respiratory distress.      Breath sounds: Normal breath sounds. No wheezing or rales.   Abdominal:      General: Bowel sounds are normal. There is no distension.      Palpations: Abdomen is soft. There is no mass.      Tenderness:  There is no abdominal tenderness. There is no guarding or rebound.   Musculoskeletal:         General: No edema.   Skin:     General: Skin is warm and dry.   Neurological:      Mental Status: She is alert and oriented to person, place, and time.   Psychiatric:         Mood and Affect: Mood and affect normal.         Behavior: Behavior normal.                 Significant Labs:   Blood Culture:   Recent Labs   Lab 09/29/21 2206 11/28/21 2121 11/28/21 2122 12/06/21  0758 12/06/21  0802   LABBLOO No growth after 5 days. Gram stain aer bottle: Gram positive cocci in clusters resembling Staph   Gram stain zelda bottle: Gram negative rods   Results called to and read back by: Love Montemayor, Charge Nurse   11/29/2021  20:32  COAGULASE-NEGATIVE STAPHYLOCOCCUS SPECIES  Organism is a probable contaminant  *  CLOSTRIDIUM SPOROGENES* No growth after 5 days. No growth after 5 days. No growth after 5 days.     CBC:   Recent Labs   Lab 01/20/22 2128 01/21/22 1235 01/22/22  0403   WBC 7.73 6.49 6.58   HGB 9.2* 8.9* 7.7*   HCT 28.6* 27.7* 24.5*    276 263     CMP:   Recent Labs   Lab 01/20/22 2128 01/20/22 2128 01/21/22 1235 01/21/22 2019 01/22/22  0322   *   < > 126* 129* 131*   K 4.1   < > 3.4* 3.6 3.8   CL 90*   < > 90* 95 97   CO2 23   < > 27 25 21*   *   < > 162* 197* 127*   BUN 32*   < > 31* 31* 31*   CREATININE 2.3*   < > 2.2* 2.0* 2.0*   CALCIUM 8.7   < > 8.7 8.2* 8.3*   PROT 7.0  --  6.4  --   --    ALBUMIN 1.9*  --  1.8*  --  1.5*   BILITOT 0.3  --  0.3  --   --    ALKPHOS 182*  --  163*  --   --    AST 24  --  12  --   --    ALT 17  --  16  --   --    ANIONGAP 13   < > 9 9 13   EGFRNONAA 21.1*   < > 22.2* 24.9* 24.9*    < > = values in this interval not displayed.     Urine Culture:   Recent Labs   Lab 09/05/21 2111 09/28/21  1730 11/28/21 1940 12/20/21 1928 01/20/22  2216   LABURIN PROTEUS MIRABILIS  50,000 - 99,999 cfu/ml  * ENTEROCOCCUS FAECALIS  > 100,000 cfu/ml  * Multiple organisms  isolated. None in predominance.  Repeat if  clinically necessary. KLEBSIELLA PNEUMONIAE ESBL  >100,000 cfu/ml  No other significant isolate  * GRAM NEGATIVE FERNANDEZ  >100,000 cfu/ml  Identification and susceptibility pending  *     Urine Studies:   Recent Labs   Lab 09/28/21  1730 11/28/21  1940 01/21/22  1402   COLORU Yellow   < > Yellow   APPEARANCEUA Cloudy*   < > Cloudy*   PHUR 6.0   < > 6.0   SPECGRAV 1.020   < > 1.010   PROTEINUA 3+*   < > 2+*   GLUCUA 2+*   < > 1+*   KETONESU Trace*   < > Negative   BILIRUBINUA Negative   < > Negative   OCCULTUA 2+*   < > 2+*   NITRITE Positive*   < > Negative   LEUKOCYTESUR 3+*   < > 3+*   RBCUA 17*   < > 80*   WBCUA >100*   < > >100*   BACTERIA Occasional   < > Many*   SQUAMEPITHEL 0  --   --    HYALINECASTS 0   < > 1    < > = values in this interval not displayed.     All pertinent labs within the past 24 hours have been reviewed.    Significant Imaging: I have reviewed all pertinent imaging results/findings within the past 24 hours.   X-Ray Chest AP Portable [[id number]] Resulted: 01/21/22 1334   Order Status: Completed Updated: 01/21/22 1336   Narrative:     EXAMINATION:   XR CHEST AP PORTABLE     CLINICAL HISTORY:   chest discomfort;     TECHNIQUE:   Single frontal view of the chest was performed.     COMPARISON:   01/20/2022     FINDINGS:   Again noted is right diaphragmatic elevation with a meniscus sign, likely indicating at least a small volume right pleural effusion.  The left lung field and pleural space remains clear.  The heart size appears normal.  There is mild calcification of the aortic knob consistent with atherosclerotic stigmata.  Tunneled right hemodialysis catheter noted.    Impression:       No interval detrimental change identified.       Electronically signed by: Rosaline Farah   Date: 01/21/2022   Time: 13:34       Imaging History    2022  Date Procedure Name Status Accession Number Location   01/21/22 01:21 PM X-Ray Chest AP Portable Final [id number]    01/20/22 09:44 PM X-Ray Chest AP Portable Final [id number] [facility]   2021  Date Procedure Name Status Accession Number Location   12/23/21 10:33 AM SURG [place] Surgery Fluoro Usage Final [id number] [facility]

## 2022-01-22 NOTE — PLAN OF CARE
Problem: Device-Related Complication Risk (Hemodialysis)  Goal: Safe, Effective Therapy Delivery  Outcome: Ongoing, Progressing  Intervention: Optimize Device Care and Function  Flowsheets (Taken 1/22/2022 3881)  Circuit Management: air detection alarms on     Problem: Hemodynamic Instability (Hemodialysis)  Goal: Effective Tissue Perfusion  Outcome: Ongoing, Progressing

## 2022-01-22 NOTE — HPI
Cecile Bowen maikel 71F with ESRD (on HD), DM2, HTN, breast cancer s/p mastectomy, and recurrent UTI presented to the ED with chest heaviness on 1/21. Pt recently seen in ED 1/20 for similar chest heaviness which was felt due to volume overload and was DC'd home. She has a chronic suprapubic catheter of 5 years and is bed bound at baseline, she has a wheelchair for mobility but rarely uses it. She was started on HD 2 months ago and has issues making HD appointments due to transportation. She has missed her last appointments. Last session was Monday prior to discharge from [facility]. She had been at SNF since 12/30/21 when she was admitted after a long hospital stay after being found down at home 11/28/21. During her hospital stay, she was treated for possible STEMI, worsening CKD, metabolic acidosis and required intubation for acute respiratory failure. She has a history of urinary retention, L sided renal stones and neurogenic bladder with L sided renal stents in place. She underwent exchange on 12/23 and had urine culture showing ESBL Kleb P that was treated with Unasyn though sensitivities show resistant.   She was discharged to [facility] and returned home on 1/17/22 after her HD session.    Since admit she has undergone SP cath change in 1/21 and has been started on cipro.  UA x 2 show WBC >100 and leukocyte 3+ and RBCs 55-80. UCx with gnr.  She denies any systemic sign of infection and reports always having cloudy urine with sediment.  Reports her sx of uti is RBCs in urine.  She is on cipro.  She had HD today and wants to go home.  ID consulted for further evaluation and treatment.

## 2022-01-22 NOTE — ED NOTES
Report received from RON Caldwell.  Pt to ED after missing dialysis.  She is asleep at this time, breathing even and unlabored on room air.  She is not using her CPAP.    LOC: Patient name and date of birth verified. The patient is awake, alert and aware of environment with an appropriate affect, the patient is oriented x 3 and speaking appropriately.   APPEARANCE: Patient resting comfortably, patient is clean and well groomed, patient's clothing is properly fastened.  MUSCULOSKELETAL: Patient moving all extremities well, no obvious swelling or deformities noted.   RESPIRATORY: Respirations are spontaneous, patient has a normal effort and rate, no accessory muscle use noted.  CARDIAC: Patient has a normal rate and rhythm, no periphreal edema noted, capillary refill < 3 seconds.  ABDOMEN: Soft and non tender to palpation, no distention noted. Bowel sounds present in all four quadrants.  NEUROLOGIC: Eyes open spontaneously, behavior appropriate to situation, follows commands, facial expression symmetrical, bilateral hand grasp equal and even, purposeful motor response noted, normal sensation in all extremities when touched with a finger.    Pt states she makes urine through suprapubic cath

## 2022-01-22 NOTE — HOSPITAL COURSE
Patient admitted for UTI and missed dialysis appointments due to transportation issues. Patient started on Rocephin for UTI and ID was consulted for antibiotic recommendations as patient has history of ESBL and MDRO. Pt transitioned to meropenem followed by Bactrim for 3 days for ESBL UTI. Midline consult ordered for extended abx coverage. Nephrology was consulted for HD while inpatient. Social work was consulted for discharge planning, working on SNF placement. PT/OT consulted and recommending SNF. Wound care consulted for skin breakdown with recs.     1/28 patient with significant lower extremity edema, Cr 1.8 > 2.1, Na 125. Continue furosemide 160 mg BID per nephrology recs. Per nephrology, even though good urine O/p, patient may benefit from RRT at least 2 times a week for clearances and volume removal. Session of HD planned today. Plan for SNF vs Home with HH, anticipate discharge 1/31.

## 2022-01-22 NOTE — PROGRESS NOTES
PT arrived to [facility] on stretcher (Bed next time), AAOX4, in NAD, VSS, on RA, maintenance HD TX initiated via [facility] CVC with a 3L UF goal over 3hours as tolerated. [facility]   01/22/22 0915   Handoff Report   Received From Rosa Isela   Given To Saloni   Treatment Type   Treatment Type Maintenance   Vital Signs   Pulse 90   Heart Rate Source Monitor   Resp 20   O2 Device (Oxygen Therapy) room air   BP (!) 177/71   BP Location Right arm   BP Method Automatic   Patient Position Lying   Orthostatic VS No   Assessments (Pre/Post)   Consent Obtained yes   Safety vein preservation armband present   Blood Liters Processed (BLP) 0   Transport Modality stretcher   Level of Consciousness (AVPU) alert   Pain   Preferred Pain Scale number (Numeric Rating Pain Scale)   Pain Rating (0-10): Rest 0   Pain Rating (0-10): Activity 0   Pre-Hemodialysis Assessment   Patient Status Arrived   Treatment Status Started   Gross Bleach Negative Yes   Machine Number K8   Alarms Verified Yes   pH 7   Machine Temperature 96.8 °F (36 °C)   Dialyzer F-160   Machine Conductivity 14   Meter Conductivity 14   Dialysate Na (mEq/L) 140   Dialysate K (mEq/L) 3   Dialysate CA (mEq/L) 2.5   Dialysate HCO3 (mEq/L) 35   Prime Ordered (mL) 250 mL   Net UF Goal 3000   Total UF Goal 3800   Pre-Hemodialysis Comments HD TX initiated VIA [facility] CVC   UF Rate 1270   RO # / DI #  Main        Hemodialysis Catheter right internal jugular   No placement date or time found.   Present Prior to Hospital Arrival?: Yes  Location: right internal jugular   Site Assessment No drainage;No redness;No swelling;No warmth   Line Securement Device Secured with sutures   Dressing Type Gauze   Dressing Status Clean;Dry   Dressing Intervention Sterile dressing change   Date on Dressing 01/22/22   Dressing Due to be Changed 01/29/22   Venous Patency/Care flushed w/o difficulty;blood return present   Arterial Patency/Care flushed w/o difficulty;blood return present   Line Necessity Review CRRT/HD    During Hemodialysis Assessment   Blood Flow Rate (mL/min) 400 mL/min   Dialysate Flow Rate (mL/min) 800 ml/min   Ultrafiltration Rate (mL/Hr) 1270 mL/Hr   Arteriovenous Lines Secure Yes   Arterial Pressure (mmHg) -130 mmHg   Venous Pressure (mmHg) 110   Blood Volume Processed (Liters) 0 L   UF Removed (mL) 0 mL   TMP 30   Venous Line in Air Detector Yes   Intake (mL) 250 mL   Intra-Hemodialysis Comments Sterile dressing change performed

## 2022-01-22 NOTE — ED NOTES
Dialysis report given, transport at bedside.  Pt breathing even and unlabored on room air.  AAOX4.  All meds from [facility]OU pyxis given.

## 2022-01-22 NOTE — PT/OT/SLP PROGRESS
Physical Therapy      Patient Name:  Cecile Bowen   MRN:  [id number]    Patient not seen today secondary to Dialysis. PT unable to return for later attempt. Will follow-up for PT evaluation at next scheduled session as able.    1/22/2022

## 2022-01-22 NOTE — CONSULTS
[facility] Emergency Dept  Infectious Disease  Consult Note    Patient Name: Cecile Bowen  MRN: [id number]  Admission Date: 1/21/2022  Hospital Length of Stay: 0 days  Attending Physician: Bashir Moses MD  Primary Care Provider: Kailey Navarro MD     Isolation Status: Contact    Patient information was obtained from patient and ER records.      Inpatient consult to Infectious Diseases  Consult performed by: JEFFERY Osorio Jr.  Consult ordered by: Veronica Morrow MD        Assessment/Plan:     Suprapubic catheter  70 yo female with Hx MDRO UTI, L renal stones and stent with SP catheter secondary to neurogenic bladder and obstruction admitted for HD with cf UTI as urine appeared purulent.  - UA x 2 positive but patient without SP pain or systemic sign of infection  - prior UCX last admit in Dec with ESBL Kleb pneumo - ertapenem and cipro sensitive  - UCX now showing GNR - suspect same as prior as she previously treated with Unasyn to which it is resistant  - concern for infection given amount of RBCs on UA  - Stable non septic    Plan:  1. DC cipro  2. Start renal adjusted ertapenem  3. Contact isolation given prior ESBL  4. FU UCX  5. Discussed with ID staff - will follow        Thank you for your consult. I will follow-up with patient. Please contact us if you have any additional questions.    JEFFERY Mckeon  Infectious Disease  [facility] - Emergency Dept    Subjective:     Principal Problem: Dialysis patient    HPI: Cecile Bowen maikel 71F with ESRD (on HD), DM2, HTN, breast cancer s/p mastectomy, and recurrent UTI presented to the ED with chest heaviness on 1/21. Pt recently seen in ED 1/20 for similar chest heaviness which was felt due to volume overload and was DC'd home. She has a chronic suprapubic catheter of 5 years and is bed bound at baseline, she has a wheelchair for mobility but rarely uses it. She was started on HD 2 months ago and has issues making HD appointments due to  transportation. She has missed her last appointments. Last session was Monday prior to discharge from [facility]. She had been at [facility] since 12/30/21 when she was admitted after a long hospital stay after being found down at home 11/28/21. During her hospital stay, she was treated for possible STEMI, worsening CKD, metabolic acidosis and required intubation for acute respiratory failure. She has a history of urinary retention, L sided renal stones and neurogenic bladder with L sided renal stents in place. She underwent exchange on 12/23 and had urine culture showing ESBL Kleb P that was treated with Unasyn though sensitivities show resistant.   She was discharged to [facility] and returned home on 1/17/22 after her HD session.    Since admit she has undergone SP cath change in 1/21 and has been started on cipro.  UA x 2 show WBC >100 and leukocyte 3+ and RBCs 55-80. UCx with gnr.  She denies any systemic sign of infection and reports always having cloudy urine with sediment.  Reports her sx of uti is RBCs in urine.  She is on cipro.  She had HD today and wants to go home.  ID consulted for further evaluation and treatment.         Past Medical History:   Diagnosis Date    Cervicogenic migraine     Chronic pain     CKD (chronic kidney disease) stage 4, GFR 15-29 ml/min     [facility]    CKD (chronic kidney disease) stage 4, GFR 15-29 ml/min     Diabetes mellitus     Long term use of Insulin, Diabetic Neuropathy    Fibromyalgia     Hydronephrosis     Hyperlipidemia     Hypertension 12/12/2012    Hypothyroidism 12/12/2012    ARON (iron deficiency anemia)     Insomnia     Levoscoliosis     Malignant neoplasm of upper-outer quadrant of left breast in female, estrogen receptor positive     Metabolic acidosis     Mobility impaired     Nuclear sclerosis - Both Eyes 3/24/2014    VIJAYA (obstructive sleep apnea)     Osteopenia     Pulmonary nodule     Recurrent UTI     Renal manifestation of secondary  diabetes mellitus     Secondary hyperparathyroidism, renal     Urinary retention        Past Surgical History:   Procedure Laterality Date    BREAST BIOPSY Right     benign    CHOLECYSTECTOMY      COLONOSCOPY N/A 1/13/2017    Procedure: COLONOSCOPY;  Surgeon: Morris Wiseman MD;  Location: [facility] ENDO (4TH FLR);  Service: Endoscopy;  Laterality: N/A;  Renal pt Nephrology advised to avoid phosphate preps    CYSTOSCOPY N/A 10/8/2018    Procedure: CYSTOSCOPY;  Surgeon: Ronel Whittington MD;  Location: [facility] OR 1ST FLR;  Service: Urology;  Laterality: N/A;  45 min    CYSTOSCOPY N/A 3/25/2019    Procedure: CYSTOSCOPY;  Surgeon: Ronel Whittington MD;  Location: [facility] OR 1ST FLR;  Service: Urology;  Laterality: N/A;  45 min    CYSTOSCOPY N/A 8/26/2019    Procedure: CYSTOSCOPY;  Surgeon: Ronel Whittington MD;  Location: [facility] OR 1ST FLR;  Service: Urology;  Laterality: N/A;  45 min    CYSTOSCOPY N/A 7/2/2021    Procedure: CYSTOSCOPY;  Surgeon: Ronel Whittington MD;  Location: [facility] OR 1ST FLR;  Service: Urology;  Laterality: N/A;    CYSTOSCOPY  12/23/2021    Procedure: CYSTOSCOPY;  Surgeon: Ronel Whittington MD;  Location: [facility] OR 1ST FLR;  Service: Urology;;    CYSTOSCOPY W/ URETERAL STENT PLACEMENT Left 5/15/2021    Procedure: CYSTOSCOPY, WITH URETERAL STENT INSERTION;  Surgeon: Levy Sánchez Jr., MD;  Location: [facility] OR 1ST FLR;  Service: Urology;  Laterality: Left;    CYSTOSCOPY WITH BIOPSY OF BLADDER N/A 1/27/2020    Procedure: CYSTOSCOPY, WITH BLADDER BIOPSY, WITH FULGURATION IF INDICATED;  Surgeon: Ronel Whittington MD;  Location: [facility] OR 1ST FLR;  Service: Urology;  Laterality: N/A;    DILATION AND CURETTAGE OF UTERUS      GALLBLADDER SURGERY  2006    HYSTERECTOMY      INJECTION FOR SENTINEL NODE IDENTIFICATION Left 8/1/2019    Procedure: INJECTION, FOR SENTINEL NODE IDENTIFICATION;  Surgeon: Samia Fulton MD;  Location: [facility] OR 2ND FLR;  Service: General;  Laterality:  Left;    INJECTION OF BOTULINUM TOXIN TYPE A N/A 10/8/2018    Procedure: INJECTION, BOTULINUM TOXIN, TYPE A 300 UNITS;  Surgeon: Ronel Whittington MD;  Location: [facility] OR [address] [place] [address];  Service: Urology;  Laterality: N/A;    INJECTION OF BOTULINUM TOXIN TYPE A N/A 3/25/2019    Procedure: INJECTION, BOTULINUM TOXIN, TYPE A 300 UNITS;  Surgeon: Ronel Whittington MD;  Location: [facility] OR [address] [place] [address];  Service: Urology;  Laterality: N/A;    INJECTION OF BOTULINUM TOXIN TYPE A N/A 8/26/2019    Procedure: INJECTION, BOTULINUM TOXIN, TYPE A 300 UNITS;  Surgeon: Ronel Whittington MD;  Location: [facility] OR [address] [place] [address];  Service: Urology;  Laterality: N/A;    MASTECTOMY Left 8/1/2019    Procedure: MASTECTOMY 23 hour stay;  Surgeon: Samia Fulton MD;  Location: [facility] OR [address] [place] [address];  Service: General;  Laterality: Left;    OVARIAN CYST SURGERY  1985    REPLACEMENT OF STENT Left 12/23/2021    Procedure: REPLACEMENT, STENT;  Surgeon: Ronel Whittington MD;  Location: [facility] OR [address] [place] [address];  Service: Urology;  Laterality: Left;    SENTINEL LYMPH NODE BIOPSY Left 8/1/2019    Procedure: BIOPSY, LYMPH NODE, SENTINEL;  Surgeon: Samia Fulton MD;  Location: [address];  Service: General;  Laterality: Left;    spt placement      TONSILLECTOMY, ADENOIDECTOMY      TOTAL ABDOMINAL HYSTERECTOMY W/ BILATERAL SALPINGOOPHORECTOMY  1985       Review of patient's allergies indicates:  No Known Allergies    Medications:  (Not in a hospital admission)    Antibiotics (From admission, onward)            Start     Stop Route Frequency Ordered    01/22/22 0900  mupirocin 2 % ointment         01/27 0859 Nasl 2 times daily 01/21/22 1620    01/21/22 2100  ciprofloxacin HCl tablet 500 mg         -- Oral Every 12 hours 01/21/22 1642        Antifungals (From admission, onward)            None        Antivirals (From admission, onward)    None           Immunization History   Administered Date(s) Administered    COVID-19, MRNA, LN-S, PF (MODERNA FULL  0.5 ML DOSE) 02/25/2021, 03/25/2021    COVID-19, MRNA, LN-S, PF (Pfizer) (Purple Cap) 12/24/2021    Influenza - High Dose - PF (65 years and older) 09/27/2017, 10/31/2018, 09/25/2019    Influenza - Quadrivalent 10/01/2014    PPD Test 07/01/2021, 12/21/2021, 01/03/2022    Pneumococcal Conjugate - 13 Valent 09/27/2017    Pneumococcal Polysaccharide - 23 Valent 09/19/2016    Zoster 12/16/2013       Family History     Problem Relation (Age of Onset)    ALS Mother    Cancer Maternal Grandmother, Paternal Grandfather, Brother    Diabetes Sister, Maternal Aunt, Maternal Uncle    Kidney disease Sister, Maternal Aunt    Prostate cancer Brother    Scoliosis Brother        Social History     Socioeconomic History    Marital status:     Number of children: 0    Highest education level: Master's degree (e.g., MA, MS, Lelo, MEd, MSW, BANDAR)   Occupational History    Occupation: teacher     Comment: retired   Tobacco Use    Smoking status: Never Smoker    Smokeless tobacco: Never Used   Substance and Sexual Activity    Alcohol use: No     Alcohol/week: 0.0 standard drinks    Drug use: No    Sexual activity: Not Currently     Birth control/protection: Abstinence   Social History Narrative    Single ()        Lives w/ sister and brother. Pt needs to keep her narcotics hidden from her brother who will steal them         Brother passed away last year.  Pt lives her her sister. (5/27)     Social Determinants of Health     Financial Resource Strain: Medium Risk    Difficulty of Paying Living Expenses: Somewhat hard   Food Insecurity: Food Insecurity Present    Worried About Running Out of Food in the Last Year: Never true    Ran Out of Food in the Last Year: Sometimes true   Transportation Needs: No Transportation Needs    Lack of Transportation (Medical): No    Lack of Transportation (Non-Medical): No   Physical Activity: Inactive    Days of Exercise per Week: 0 days    Minutes of Exercise per  Session: 0 min   Stress: No Stress Concern Present    Feeling of Stress : Only a little   Social Connections: Moderately Isolated    Frequency of Communication with Friends and Family: Three times a week    Frequency of Social Gatherings with Friends and Family: Never    Attends Latter day Services: Never    Active Member of Clubs or Organizations: Yes    Attends Club or Organization Meetings: Never    Marital Status:    Housing Stability: Low Risk     Unable to Pay for Housing in the Last Year: No    Number of Places Lived in the Last Year: 1    Unstable Housing in the Last Year: No     Review of Systems   Constitutional: Negative for appetite change, chills, diaphoresis, fatigue, fever and unexpected weight change.   HENT: Negative for congestion, ear pain, hearing loss, sore throat and tinnitus.    Eyes: Negative for pain, redness and visual disturbance.   Respiratory: Positive for chest tightness (intermittently with tender lump at sternum). Negative for cough, shortness of breath and wheezing.    Cardiovascular: Negative for chest pain.   Gastrointestinal: Negative for abdominal pain, constipation, diarrhea, nausea and vomiting.   Endocrine: Negative for cold intolerance and heat intolerance.   Genitourinary: Positive for difficulty urinating. Negative for decreased urine volume, dysuria, flank pain, frequency, hematuria and urgency.   Musculoskeletal: Negative for arthralgias, back pain, myalgias and neck pain.   Skin: Negative for rash and wound.   Allergic/Immunologic: Negative for environmental allergies, food allergies and immunocompromised state.   Neurological: Negative for dizziness, facial asymmetry, weakness, light-headedness, numbness and headaches.   Hematological: Negative for adenopathy. Does not bruise/bleed easily.   Psychiatric/Behavioral: Negative for agitation, behavioral problems and confusion.     Objective:     Vital Signs (Most Recent):  Temp: 99.4 °F (37.4 °C) (01/22/22  0830)  Pulse: 89 (01/22/22 0830)  Resp: 20 (01/22/22 0820)  BP: (!) 182/79 (01/22/22 0830)  SpO2: 99 % (01/22/22 0830) Vital Signs (24h Range):  Temp:  [98.7 °F (37.1 °C)-99.4 °F (37.4 °C)] 99.4 °F (37.4 °C)  Pulse:  [61-97] 89  Resp:  [16-20] 20  SpO2:  [95 %-99 %] 99 %  BP: (109-182)/(65-79) 182/79     Weight: 120.7 kg (266 lb)  Body mass index is 47.12 kg/m².    Estimated Creatinine Clearance: 33.4 mL/min (A) (based on SCr of 2 mg/dL (H)).    Physical Exam  Constitutional:       General: She is not in acute distress.     Appearance: She is well-developed and well-nourished. She is obese. She is not ill-appearing, toxic-appearing or diaphoretic.   HENT:      Head: Normocephalic and atraumatic.   Cardiovascular:      Rate and Rhythm: Normal rate and regular rhythm.      Heart sounds: Normal heart sounds. No murmur heard.  No friction rub. No gallop.    Pulmonary:      Effort: Pulmonary effort is normal. No respiratory distress.      Breath sounds: Normal breath sounds. No wheezing or rales.   Abdominal:      General: Bowel sounds are normal. There is no distension.      Palpations: Abdomen is soft. There is no mass.      Tenderness: There is no abdominal tenderness. There is no guarding or rebound.   Musculoskeletal:         General: No edema.   Skin:     General: Skin is warm and dry.   Neurological:      Mental Status: She is alert and oriented to person, place, and time.   Psychiatric:         Mood and Affect: Mood and affect normal.         Behavior: Behavior normal.                 Significant Labs:   Blood Culture:   Recent Labs   Lab 09/29/21 2206 11/28/21 2121 11/28/21 2122 12/06/21  0758 12/06/21  0802   LABBLOO No growth after 5 days. Gram stain aer bottle: Gram positive cocci in clusters resembling Staph   Gram stain zelda bottle: Gram negative rods   Results called to and read back by: Love Montemayor, Charge Nurse   11/29/2021  20:32  COAGULASE-NEGATIVE STAPHYLOCOCCUS SPECIES  Organism is a probable  contaminant  *  CLOSTRIDIUM SPOROGENES* No growth after 5 days. No growth after 5 days. No growth after 5 days.     CBC:   Recent Labs   Lab 01/20/22 2128 01/21/22 1235 01/22/22  0403   WBC 7.73 6.49 6.58   HGB 9.2* 8.9* 7.7*   HCT 28.6* 27.7* 24.5*    276 263     CMP:   Recent Labs   Lab 01/20/22 2128 01/20/22 2128 01/21/22 1235 01/21/22 2019 01/22/22  0322   *   < > 126* 129* 131*   K 4.1   < > 3.4* 3.6 3.8   CL 90*   < > 90* 95 97   CO2 23   < > 27 25 21*   *   < > 162* 197* 127*   BUN 32*   < > 31* 31* 31*   CREATININE 2.3*   < > 2.2* 2.0* 2.0*   CALCIUM 8.7   < > 8.7 8.2* 8.3*   PROT 7.0  --  6.4  --   --    ALBUMIN 1.9*  --  1.8*  --  1.5*   BILITOT 0.3  --  0.3  --   --    ALKPHOS 182*  --  163*  --   --    AST 24  --  12  --   --    ALT 17  --  16  --   --    ANIONGAP 13   < > 9 9 13   EGFRNONAA 21.1*   < > 22.2* 24.9* 24.9*    < > = values in this interval not displayed.     Urine Culture:   Recent Labs   Lab 09/05/21 2111 09/28/21 1730 11/28/21 1940 12/20/21 1928 01/20/22  2216   LABURIN PROTEUS MIRABILIS  50,000 - 99,999 cfu/ml  * ENTEROCOCCUS FAECALIS  > 100,000 cfu/ml  * Multiple organisms isolated. None in predominance.  Repeat if  clinically necessary. KLEBSIELLA PNEUMONIAE ESBL  >100,000 cfu/ml  No other significant isolate  * GRAM NEGATIVE FERNANDEZ  >100,000 cfu/ml  Identification and susceptibility pending  *     Urine Studies:   Recent Labs   Lab 09/28/21 1730 11/28/21 1940 01/21/22  1402   COLORU Yellow   < > Yellow   APPEARANCEUA Cloudy*   < > Cloudy*   PHUR 6.0   < > 6.0   SPECGRAV 1.020   < > 1.010   PROTEINUA 3+*   < > 2+*   GLUCUA 2+*   < > 1+*   KETONESU Trace*   < > Negative   BILIRUBINUA Negative   < > Negative   OCCULTUA 2+*   < > 2+*   NITRITE Positive*   < > Negative   LEUKOCYTESUR 3+*   < > 3+*   RBCUA 17*   < > 80*   WBCUA >100*   < > >100*   BACTERIA Occasional   < > Many*   SQUAMEPITHEL 0  --   --    HYALINECASTS 0   < > 1    < > = values in this  interval not displayed.     All pertinent labs within the past 24 hours have been reviewed.    Significant Imaging: I have reviewed all pertinent imaging results/findings within the past 24 hours.   X-Ray Chest AP Portable [[id number]] Resulted: 01/21/22 1334   Order Status: Completed Updated: 01/21/22 1336   Narrative:     EXAMINATION:   XR CHEST AP PORTABLE     CLINICAL HISTORY:   chest discomfort;     TECHNIQUE:   Single frontal view of the chest was performed.     COMPARISON:   01/20/2022     FINDINGS:   Again noted is right diaphragmatic elevation with a meniscus sign, likely indicating at least a small volume right pleural effusion.  The left lung field and pleural space remains clear.  The heart size appears normal.  There is mild calcification of the aortic knob consistent with atherosclerotic stigmata.  Tunneled right hemodialysis catheter noted.    Impression:       No interval detrimental change identified.       Electronically signed by: Rosaline Farah   Date: 01/21/2022   Time: 13:34       Imaging History    2022  Date Procedure Name Status Accession Number Location   01/21/22 01:21 PM X-Ray Chest AP Portable Final [id number] [facility]   01/20/22 09:44 PM X-Ray Chest AP Portable Final [id number] [facility]   2021  Date Procedure Name Status Accession Number Location   12/23/21 10:33 AM SURG [place] [facility] Fluoro Usage Final [id number] [facility]

## 2022-01-22 NOTE — PROGRESS NOTES
HD TX complete, report to primary RN. Blood returned, lines hep locked and capped.    01/22/22 1210   Handoff Report   Received From [facility]   Given To Laura   Vital Signs   Pulse 87   Heart Rate Source Monitor   Resp 20   O2 Device (Oxygen Therapy) room air   BP Location Right arm   BP Method Automatic   Patient Position Lying   Orthostatic VS No   Assessments (Pre/Post)   Consent Obtained yes   Safety vein preservation armband present   Date Hepatitis Profile Obtained 01/22/22   Blood Liters Processed (BLP) 65.6   Transport Modality bed   Level of Consciousness (AVPU) alert   Dialyzer Clearance moderately streaked   Pain   Preferred Pain Scale number (Numeric Rating Pain Scale)   Pain Rating (0-10): Rest 0   Pain Rating (0-10): Activity 0   Pre-Hemodialysis Assessment   Treatment Status Completed        Hemodialysis Catheter right internal jugular   No placement date or time found.   Present Prior to Hospital Arrival?: Yes  Location: right internal jugular   Site Assessment No drainage;No redness;No swelling;No warmth   Line Securement Device Secured with sutures   Dressing Type Biopatch in place;Central line dressing   Dressing Status Clean;Dry;Intact   Dressing Intervention Integrity maintained   Date on Dressing 01/22/22   Dressing Due to be Changed 01/29/22   Venous Patency/Care heparin locked   Arterial Patency/Care heparin locked   Line Necessity Review CRRT/HD   During Hemodialysis Assessment   Blood Flow Rate (mL/min) 400 mL/min   Dialysate Flow Rate (mL/min) 800 ml/min   Ultrafiltration Rate (mL/Hr) 1270 mL/Hr   Arteriovenous Lines Secure Yes   Arterial Pressure (mmHg) -150 mmHg   Venous Pressure (mmHg) 150   Blood Volume Processed (Liters) 65.6 L   UF Removed (mL) 3800 mL   TMP 40   Venous Line in Air Detector Yes   Intake (mL) 550 mL   Intra-Hemodialysis Comments HD TX complete; blood returned   Post-Hemodialysis Assessment   Rinseback Volume (mL) 250 mL   Blood Volume Processed (Liters) 65.6 L    Dialyzer Clearance Moderately streaked   Duration of Treatment (minutes) 180 minutes   Hemodialysis Intake (mL) 800 mL   Total UF (mL) 3800 mL   Net Fluid Removal 3000   Patient Response to Treatment Tolerated well   Post-Hemodialysis Comments See notes

## 2022-01-22 NOTE — PROGRESS NOTES
[facility] STAFF HEMODIALYSIS NOTE     Patient currently on hemodialysis for removal of uremic toxins and volume.     Patient seen and evaluated on hemodialysis, tolerating treatment, see HD flowsheet for vitals and assessments.    Labs have been reviewed and the dialysate bath has been adjusted.       Assessment/Plan:    -HD dependent ZAK, possible progression to ESRD    -Patient seen on HD, tolerating treatment well, w/o complaints   -UF goal of 3L as tolerated   -Renal diet, if not NPO   -Strict I/O's and daily weights  -Daily renal function panels  -Keep MAP >65 while on HD   -Maintain Hgb >7.0  -Epo with HD   -Will continue to follow while inpatient       DIANN Fuchs, AGNP-C  Nephrology  Pager:  [phone]

## 2022-01-22 NOTE — ED PROVIDER NOTES
Encounter Date: 1/21/2022       History     Chief Complaint   Patient presents with    dialysis     PT has not had dialysis since Monday and missed dialysis Wed.      Patient is a 71-year-old female with a past medical history of ESRD (dialysis Monday, Wednesday, Friday), HTN, HLD here because she has missed her Wednesday and now her Friday dialysis sessions due to transportation issues.  Patient endorses worsening bilateral lower extremity edema and states that for the past several days she has had a central chest pressure and shortness of breath.  Patient denies any nausea or vomiting, fever.  Of note, patient was seen in the ED last night worked up for chest pain and was discharged.        Review of patient's allergies indicates:  No Known Allergies  Past Medical History:   Diagnosis Date    Cervicogenic migraine     Chronic pain     CKD (chronic kidney disease) stage 4, GFR 15-29 ml/min     [facility]    CKD (chronic kidney disease) stage 4, GFR 15-29 ml/min     Diabetes mellitus     Long term use of Insulin, Diabetic Neuropathy    Fibromyalgia     Hydronephrosis     Hyperlipidemia     Hypertension 12/12/2012    Hypothyroidism 12/12/2012    ARON (iron deficiency anemia)     Insomnia     Levoscoliosis     Malignant neoplasm of upper-outer quadrant of left breast in female, estrogen receptor positive     Metabolic acidosis     Mobility impaired     Nuclear sclerosis - Both Eyes 3/24/2014    VIJAYA (obstructive sleep apnea)     Osteopenia     Pulmonary nodule     Recurrent UTI     Renal manifestation of secondary diabetes mellitus     Secondary hyperparathyroidism, renal     Urinary retention      Past Surgical History:   Procedure Laterality Date    BREAST BIOPSY Right     benign    CHOLECYSTECTOMY      COLONOSCOPY N/A 1/13/2017    Procedure: COLONOSCOPY;  Surgeon: Morris Wiseman MD;  Location: [address]);  Service: Endoscopy;  Laterality: N/A;  Renal pt Nephrology advised to  avoid phosphate preps    CYSTOSCOPY N/A 10/8/2018    Procedure: CYSTOSCOPY;  Surgeon: Ronel Whittington MD;  Location: [facility] OR 1ST FLR;  Service: Urology;  Laterality: N/A;  45 min    CYSTOSCOPY N/A 3/25/2019    Procedure: CYSTOSCOPY;  Surgeon: Ronel Whittington MD;  Location: [facility] OR 1ST FLR;  Service: Urology;  Laterality: N/A;  45 min    CYSTOSCOPY N/A 8/26/2019    Procedure: CYSTOSCOPY;  Surgeon: Ronel Whittington MD;  Location: [facility] OR 1ST FLR;  Service: Urology;  Laterality: N/A;  45 min    CYSTOSCOPY N/A 7/2/2021    Procedure: CYSTOSCOPY;  Surgeon: Ronel Whittington MD;  [facility];  Service: Urology;  Laterality: N/A;    CYSTOSCOPY  12/23/2021    Procedure: CYSTOSCOPY;  Surgeon: Ronel Whittington MD;  [facility];  Service: Urology;;    CYSTOSCOPY W/ URETERAL STENT PLACEMENT Left 5/15/2021    Procedure: CYSTOSCOPY, WITH URETERAL STENT INSERTION;  Surgeon: Levy Sánchez Jr., MD;  [facility];  Service: Urology;  Laterality: Left;    CYSTOSCOPY WITH BIOPSY OF BLADDER N/A 1/27/2020    Procedure: CYSTOSCOPY, WITH BLADDER BIOPSY, WITH FULGURATION IF INDICATED;  Surgeon: Ronel Whittington MD;  Location: [facility] OR [address] [place] [address];  Service: Urology;  Laterality: N/A;    DILATION AND CURETTAGE OF UTERUS      GALLBLADDER SURGERY  2006    HYSTERECTOMY      INJECTION FOR SENTINEL NODE IDENTIFICATION Left 8/1/2019    Procedure: INJECTION, FOR SENTINEL NODE IDENTIFICATION;  Surgeon: Samia Fulton MD;  Location: [facility] OR 2ND FLR;  Service: General;  Laterality: Left;    INJECTION OF BOTULINUM TOXIN TYPE A N/A 10/8/2018    Procedure: INJECTION, BOTULINUM TOXIN, TYPE A 300 UNITS;  Surgeon: Ronel Whittington MD;  Location: [facility] OR 1ST FLR;  Service: Urology;  Laterality: N/A;    INJECTION OF BOTULINUM TOXIN TYPE A N/A 3/25/2019    Procedure: INJECTION, BOTULINUM TOXIN, TYPE A 300 UNITS;  Surgeon: Ronel Whittington MD;  Location: [facility] OR  1ST FLR;  Service: Urology;  Laterality: N/A;    INJECTION OF BOTULINUM TOXIN TYPE A N/A 8/26/2019    Procedure: INJECTION, BOTULINUM TOXIN, TYPE A 300 UNITS;  Surgeon: Ronel Whittington MD;  [facility];  Service: Urology;  Laterality: N/A;    MASTECTOMY Left 8/1/2019    Procedure: MASTECTOMY 23 hour stay;  Surgeon: Samia Fulton MD;  Location: [facility] OR 2ND FLR;  Service: General;  Laterality: Left;    OVARIAN CYST SURGERY  1985    REPLACEMENT OF STENT Left 12/23/2021    Procedure: REPLACEMENT, STENT;  Surgeon: Ronel Whittington MD;  [facility];  Service: Urology;  Laterality: Left;    SENTINEL LYMPH NODE BIOPSY Left 8/1/2019    Procedure: BIOPSY, LYMPH NODE, SENTINEL;  Surgeon: Samia Fulton MD;  [facility];  Service: General;  Laterality: Left;    spt placement      TONSILLECTOMY, ADENOIDECTOMY      TOTAL ABDOMINAL HYSTERECTOMY W/ BILATERAL SALPINGOOPHORECTOMY  1985     Family History   Problem Relation Age of Onset    Diabetes Sister     Kidney disease Sister         CKD III    ALS Mother         d.    Cancer Maternal Grandmother         d. colon    Cancer Paternal Grandfather         d. lung    Scoliosis Brother         increased pain    Prostate cancer Brother         cured s/p surgery    Cancer Brother         rare cancer that got into the bones    Diabetes Maternal Aunt     Kidney disease Maternal Aunt     Diabetes Maternal Uncle     Amblyopia Neg Hx     Blindness Neg Hx     Cataracts Neg Hx     Glaucoma Neg Hx     Macular degeneration Neg Hx     Retinal detachment Neg Hx     Strabismus Neg Hx      Social History     Tobacco Use    Smoking status: Never Smoker    Smokeless tobacco: Never Used   Substance Use Topics    Alcohol use: No     Alcohol/week: 0.0 standard drinks    Drug use: No     Review of Systems   Constitutional: Negative for chills and fever.   HENT: Negative for congestion and rhinorrhea.    Respiratory:  Positive for shortness of breath. Negative for cough.    Cardiovascular: Positive for chest pain and leg swelling.   Gastrointestinal: Negative for abdominal pain, nausea and vomiting.   Genitourinary: Negative for dysuria and hematuria.   Musculoskeletal: Negative for back pain.   Skin: Negative for rash and wound.   Neurological: Negative for syncope and headaches.   Psychiatric/Behavioral: Negative for agitation and confusion.       Physical Exam     Initial Vitals [01/21/22 1128]   BP Pulse Resp Temp SpO2   124/76 86 20 98.7 °F (37.1 °C) 98 %      MAP       --         Physical Exam    ED Course   Procedures  Labs Reviewed   B-TYPE NATRIURETIC PEPTIDE - Abnormal; Notable for the following components:       Result Value     (*)     All other components within normal limits   CBC W/ AUTO DIFFERENTIAL - Abnormal; Notable for the following components:    RBC 3.07 (*)     Hemoglobin 8.9 (*)     Hematocrit 27.7 (*)     RDW 14.7 (*)     MPV 8.8 (*)     Immature Granulocytes 1.5 (*)     Immature Grans (Abs) 0.10 (*)     All other components within normal limits   COMPREHENSIVE METABOLIC PANEL - Abnormal; Notable for the following components:    Sodium 126 (*)     Potassium 3.4 (*)     Chloride 90 (*)     Glucose 162 (*)     BUN 31 (*)     Creatinine 2.2 (*)     Albumin 1.8 (*)     Alkaline Phosphatase 163 (*)     eGFR if  25.6 (*)     eGFR if non  22.2 (*)     All other components within normal limits   URINALYSIS, REFLEX TO URINE CULTURE - Abnormal; Notable for the following components:    Appearance, UA Cloudy (*)     Protein, UA 2+ (*)     Glucose, UA 1+ (*)     Occult Blood UA 2+ (*)     Leukocytes, UA 3+ (*)     All other components within normal limits    Narrative:     Specimen Source->Urine   URINALYSIS MICROSCOPIC - Abnormal; Notable for the following components:    RBC, UA 80 (*)     WBC, UA >100 (*)     WBC Clumps, UA Many (*)     Bacteria Many (*)     All other  components within normal limits    Narrative:     Specimen Source->Urine   CULTURE, URINE   TROPONIN I   MAGNESIUM   PHOSPHORUS   BASIC METABOLIC PANEL   SARS-COV-2 RDRP GENE    Narrative:     This test utilizes isothermal nucleic acid amplification   technology to detect the SARS-CoV-2 RdRp nucleic acid segment.   The analytical sensitivity (limit of detection) is 125 genome   equivalents/mL.   A POSITIVE result implies infection with the SARS-CoV-2 virus;   the patient is presumed to be contagious.     A NEGATIVE result means that SARS-CoV-2 nucleic acids are not   present above the limit of detection. A NEGATIVE result should be   treated as presumptive. It does not rule out the possibility of   COVID-19 and should not be the sole basis for treatment decisions.   If COVID-19 is strongly suspected based on clinical and exposure   history, re-testing using an alternate molecular assay should be   considered.   This test is only for use under the Food and Drug   Administration s Emergency Use Authorization (EUA).   Commercial kits are provided by [employer].   Performance characteristics of the EUA have been independently   verified by [facility] Department of   Pathology and Laboratory Medicine.   _________________________________________________________________   The authorized Fact Sheet for Healthcare Providers and the authorized Fact   Sheet for Patients of the ID NOW COVID-19 are available on the FDA   website:     https://www.fda.gov/media/797121/download  https://www.fda.gov/media/732478/download              ECG Results          EKG 12-lead (In process)  Result time 01/21/22 15:06:46    In process by Interface, Lab In [facility] (01/21/22 15:06:46)                 Narrative:    Test Reason : R07.9,    Vent. Rate : 090 BPM     Atrial Rate : 090 BPM     P-R Int : 150 ms          QRS Dur : 084 ms      QT Int : 372 ms       P-R-T Axes : 003 007 098 degrees     QTc Int : 455 ms    Normal sinus  rhythm  Nonspecific T wave abnormality  Abnormal ECG  When compared with ECG of 20-JAN-2022 19:58,  No significant change was found    Referred By: [email or web address]   SELF           Confirmed By:                             Imaging Results          X-Ray Chest AP Portable (Final result)  Result time 01/21/22 13:34:17    Final result by Rosaline Farah MD (01/21/22 13:34:17)                 Impression:      No interval detrimental change identified.      Electronically signed by: Rosaline Farah  Date:    01/21/2022  Time:    13:34             Narrative:    EXAMINATION:  XR CHEST AP PORTABLE    CLINICAL HISTORY:  chest discomfort;    TECHNIQUE:  Single frontal view of the chest was performed.    COMPARISON:  01/20/2022    FINDINGS:  Again noted is right diaphragmatic elevation with a meniscus sign, likely indicating at least a small volume right pleural effusion.  The left lung field and pleural space remains clear.  The heart size appears normal.  There is mild calcification of the aortic knob consistent with atherosclerotic stigmata.  Tunneled right hemodialysis catheter noted.                                 Medications   albuterol inhaler 2 puff (has no administration in time range)   anastrozole tablet 1 mg (has no administration in time range)   apixaban tablet 2.5 mg (has no administration in time range)   atorvastatin tablet 80 mg (has no administration in time range)   butalbital-acetaminophen-caffeine -40 mg per tablet 1 tablet (has no administration in time range)   cloNIDine tablet 0.2 mg (has no administration in time range)   ergocalciferol capsule 50,000 Units (has no administration in time range)   famotidine tablet 20 mg (has no administration in time range)   furosemide tablet 160 mg (has no administration in time range)   gemfibroziL tablet 600 mg (has no administration in time range)   methocarbamoL tablet 750 mg (750 mg Oral Given 1/21/22 1728)   metoprolol tartrate (LOPRESSOR) tablet 25 mg (has no  administration in time range)   oxybutynin 24 hr tablet 10 mg (has no administration in time range)   sumatriptan tablet 100 mg (has no administration in time range)   levothyroxine tablet 50 mcg (has no administration in time range)   sodium chloride 0.9% flush 10 mL (has no administration in time range)   acetaminophen tablet 650 mg (has no administration in time range)   polyethylene glycol packet 17 g (has no administration in time range)   senna-docusate 8.6-50 mg per tablet 1 tablet (has no administration in time range)   ondansetron injection 4 mg (has no administration in time range)   oxyCODONE immediate release tablet 10 mg (10 mg Oral Given 1/21/22 1717)   melatonin tablet 6 mg (has no administration in time range)   mupirocin 2 % ointment (has no administration in time range)   0.9%  NaCl infusion (has no administration in time range)   sodium chloride 0.9% bolus 250 mL (has no administration in time range)   heparin (porcine) injection 1,000 Units (has no administration in time range)   ciprofloxacin HCl tablet 500 mg (has no administration in time range)   ciprofloxacin HCl tablet 500 mg (500 mg Oral Given 1/21/22 1530)     Medical Decision Making:   Initial Assessment:   Patient is a 71-year-old female here for dialysis with a increasing leg swelling, chest pressure, shortness of breath. Pt normotensive with normal heart rate, non-toxic appearing, satting well on room air with normal respiratory rate, afebrile. Exam notable for bilateral lower extremity edema to level of knees, normal heart and lung sounds.  Differential Diagnosis:   Ddx includes but not limited to fluid overload, electrolyte abnormality, ACS, arrhythmia, pneumonia.  Clinical Tests:   Lab Tests: Ordered and Reviewed  Radiological Study: Ordered and Reviewed  Medical Tests: Ordered and Reviewed  ED Management:  EKG shows NSR with rate of 90, no STEMI or peaked T waves.  CBC with stable anemia.  CMP with hyponatremia that appears to be  stable, potassium 3.4, decreased chloride, elevated BUN and creatinine consistent with ESRD.  Mag, phos unremarkable, troponin within normal limits.  UA positive for many bacteria and wbc's, rbc's.  Patient has a history of ESBL UTI sensitive to Cipro, so Cipro given.  Patient unable to receive dialysis, and given the patient has had increasing lower extremity edema and will not be able to get dialysis until Monday, patient admitted to Hospital Medicine for dialysis and UTI treatment.            Attending Attestation:   Physician Attestation Statement for Resident:  As the supervising MD   Physician Attestation Statement: I have personally seen and examined this patient.   I agree with the above history. -:   As the supervising MD I agree with the above PE.    As the supervising MD I agree with the above treatment, course, plan, and disposition.  I have reviewed and agree with the residents interpretation of the following: lab data and x-rays.  I have reviewed the following: old records at this facility and records from a referring facility.                ED Course as of 01/21/22 1907 Fri Jan 21, 2022   1339 BNP(!): 167 [JR]   1340 Phosphorus: 4.4 [JR]   1433 Urinalysis Microscopic(!) [JR]   1433 WBC Clumps, UA(!): Many [JR]   1433 WBC, UA(!): >100 [JR]      ED Course User Index  [JR] Susan Gomez MD             Clinical Impression:   Final diagnoses:  [R07.9] Chest pain  [N18.6] ESRD (end stage renal disease) (Primary)  [N39.0] Urinary tract infection without hematuria, site unspecified          ED Disposition Condition    Observation               Sabina Jamil MD  Resident  01/21/22 1907       Susan Gomez MD  01/24/22 0941

## 2022-01-22 NOTE — CONSULTS
[facility] - Emergency Dept  Nephrology  Consult Note    Patient Name: Cecile Bowen  MRN: [id number]  Admission Date: 1/21/2022  Hospital Length of Stay: 0 days  Attending Provider: Bashir Moses MD   Primary Care Physician: Kailey Navarro MD  Principal Problem:Dialysis patient    Inpatient consult to Nephrology  Consult performed by: Emmanuel Hare MD  Consult ordered by: Sabina Jamil MD  Reason for consult: dd zak  Assessment/Recommendations: Hd tomorrow        Subjective:     HPI: 71 year old female with dialysis dependent ZAK here because she felt bad and could get to her dialysis unit. She says she couldn't get to dialysis because she couldn't arrange transportation. Last HD 1/17/22.  Nephrology consulted for ESKD.      Past Medical History:   Diagnosis Date    Cervicogenic migraine     Chronic pain     CKD (chronic kidney disease) stage 4, GFR 15-29 ml/min     [facility]    CKD (chronic kidney disease) stage 4, GFR 15-29 ml/min     Diabetes mellitus     Long term use of Insulin, Diabetic Neuropathy    Fibromyalgia     Hydronephrosis     Hyperlipidemia     Hypertension 12/12/2012    Hypothyroidism 12/12/2012    ARON (iron deficiency anemia)     Insomnia     Levoscoliosis     Malignant neoplasm of upper-outer quadrant of left breast in female, estrogen receptor positive     Metabolic acidosis     Mobility impaired     Nuclear sclerosis - Both Eyes 3/24/2014    VIJAYA (obstructive sleep apnea)     Osteopenia     Pulmonary nodule     Recurrent UTI     Renal manifestation of secondary diabetes mellitus     Secondary hyperparathyroidism, renal     Urinary retention        Past Surgical History:   Procedure Laterality Date    BREAST BIOPSY Right     benign    CHOLECYSTECTOMY      COLONOSCOPY N/A 1/13/2017    Procedure: COLONOSCOPY;  Surgeon: Morris Wiseman MD;  Location: [address];  Service: Endoscopy;  Laterality: N/A;  Renal pt Nephrology advised to  avoid phosphate preps    CYSTOSCOPY N/A 10/8/2018    Procedure: CYSTOSCOPY;  Surgeon: Ronel Whittington MD;  Location: [facility] OR 1ST FLR;  Service: Urology;  Laterality: N/A;  45 min    CYSTOSCOPY N/A 3/25/2019    Procedure: CYSTOSCOPY;  Surgeon: Ronel Whittington MD;  Location: [facility] OR 1ST FLR;  Service: Urology;  Laterality: N/A;  45 min    CYSTOSCOPY N/A 8/26/2019    Procedure: CYSTOSCOPY;  Surgeon: Ronel Whittington MD;  Location: [facility] OR 1ST FLR;  Service: Urology;  Laterality: N/A;  45 min    CYSTOSCOPY N/A 7/2/2021    Procedure: CYSTOSCOPY;  Surgeon: Ronel Whittington MD;  [facility];  Service: Urology;  Laterality: N/A;    CYSTOSCOPY  12/23/2021    Procedure: CYSTOSCOPY;  Surgeon: Ronel Whittington MD;  [facility];  Service: Urology;;    CYSTOSCOPY W/ URETERAL STENT PLACEMENT Left 5/15/2021    Procedure: CYSTOSCOPY, WITH URETERAL STENT INSERTION;  Surgeon: Levy Sánchez Jr., MD;  [facility];  Service: Urology;  Laterality: Left;    CYSTOSCOPY WITH BIOPSY OF BLADDER N/A 1/27/2020    Procedure: CYSTOSCOPY, WITH BLADDER BIOPSY, WITH FULGURATION IF INDICATED;  Surgeon: Ronel Whittington MD;  Location: [facility] OR [address] [place] [address];  Service: Urology;  Laterality: N/A;    DILATION AND CURETTAGE OF UTERUS      GALLBLADDER SURGERY  2006    HYSTERECTOMY      INJECTION FOR SENTINEL NODE IDENTIFICATION Left 8/1/2019    Procedure: INJECTION, FOR SENTINEL NODE IDENTIFICATION;  Surgeon: Samia Fulton MD;  Location: [facility] OR 2ND FLR;  Service: General;  Laterality: Left;    INJECTION OF BOTULINUM TOXIN TYPE A N/A 10/8/2018    Procedure: INJECTION, BOTULINUM TOXIN, TYPE A 300 UNITS;  Surgeon: Ronel Whittington MD;  Location: [facility] OR 1ST FLR;  Service: Urology;  Laterality: N/A;    INJECTION OF BOTULINUM TOXIN TYPE A N/A 3/25/2019    Procedure: INJECTION, BOTULINUM TOXIN, TYPE A 300 UNITS;  Surgeon: Ronel Whittington MD;  Location: [facility] OR  1ST FLR;  Service: Urology;  Laterality: N/A;    INJECTION OF BOTULINUM TOXIN TYPE A N/A 8/26/2019    Procedure: INJECTION, BOTULINUM TOXIN, TYPE A 300 UNITS;  Surgeon: Ronel Whittington MD;  [facility];  Service: Urology;  Laterality: N/A;    MASTECTOMY Left 8/1/2019    Procedure: MASTECTOMY 23 hour stay;  Surgeon: Samia Fulton MD;  Location: [facility] OR 2ND FLR;  Service: General;  Laterality: Left;    OVARIAN CYST SURGERY  1985    REPLACEMENT OF STENT Left 12/23/2021    Procedure: REPLACEMENT, STENT;  Surgeon: Ronel Whittington MD;  [facility];  Service: Urology;  Laterality: Left;    SENTINEL LYMPH NODE BIOPSY Left 8/1/2019    Procedure: BIOPSY, LYMPH NODE, SENTINEL;  Surgeon: Samia Fulton MD;  [facility];  Service: General;  Laterality: Left;    spt placement      TONSILLECTOMY, ADENOIDECTOMY      TOTAL ABDOMINAL HYSTERECTOMY W/ BILATERAL SALPINGOOPHORECTOMY  1985       Review of patient's allergies indicates:  No Known Allergies  Current Facility-Administered Medications   Medication Frequency    acetaminophen tablet 650 mg Q4H PRN    albuterol inhaler 2 puff Q6H PRN    [START ON 1/22/2022] anastrozole tablet 1 mg Daily    apixaban tablet 2.5 mg BID    [START ON 1/22/2022] atorvastatin tablet 80 mg Daily    butalbital-acetaminophen-caffeine -40 mg per tablet 1 tablet Daily PRN    cloNIDine tablet 0.2 mg TID    darbepoetin chloe-polysorbate injection 50 mcg Q7 Days    [START ON 1/22/2022] ergocalciferol capsule 50,000 Units Q7 Days    [START ON 1/22/2022] famotidine tablet 20 mg Daily    furosemide tablet 160 mg BID    gemfibroziL tablet 600 mg Every Mon, Wed, Fri    heparin (porcine) injection 1,800 Units Every Mon, Wed, Fri    heparin (porcine) injection 1,800 Units Every Mon, Wed, Fri    iron sucrose injection 200 mg Every Mon, Wed, Fri    [START ON 1/22/2022] levothyroxine tablet 50 mcg Before breakfast    melatonin tablet  "6 mg Nightly PRN    methocarbamoL tablet 750 mg TID PRN    metoprolol tartrate (LOPRESSOR) tablet 25 mg BID    ondansetron injection 4 mg Q8H PRN    [START ON 1/22/2022] oxybutynin 24 hr tablet 10 mg Daily    oxyCODONE immediate release tablet 10 mg Q4H PRN    [START ON 1/22/2022] polyethylene glycol packet 17 g Daily    senna-docusate 8.6-50 mg per tablet 1 tablet BID    sodium chloride 0.9% flush 10 mL PRN    sumatriptan tablet 100 mg BID PRN     Current Outpatient Medications   Medication    albuterol (PROVENTIL HFA) 90 mcg/actuation inhaler    anastrozole (ARIMIDEX) 1 mg Tab    apixaban (ELIQUIS) 2.5 mg Tab    atorvastatin (LIPITOR) 80 MG tablet    BD INSULIN SYRINGE ULTRA-FINE 0.5 mL 31 gauge x 5/16" Syrg    blood sugar diagnostic Strp    blood sugar diagnostic Strp    blood-glucose meter kit    butalbital-acetaminophen-caffeine -40 mg (FIORICET, ESGIC) -40 mg per tablet    cloNIDine (CATAPRES) 0.2 MG tablet    ergocalciferol (ERGOCALCIFEROL) 50,000 unit Cap    famotidine (PEPCID) 20 MG tablet    furosemide (LASIX) 80 MG tablet    gemfibroziL (LOPID) 600 MG tablet    lancets Misc    lancets Misc    magnesium oxide (MAG-OX) 400 mg (241.3 mg magnesium) tablet    melatonin (MELATIN) 3 mg tablet    methocarbamoL (ROBAXIN) 750 MG Tab    metoprolol tartrate (LOPRESSOR) 25 MG tablet    oxybutynin (DITROPAN-XL) 10 MG 24 hr tablet    pen needle, diabetic (BD ULTRA-FINE SHORT PEN NEEDLE) 31 gauge x 5/16" Ndle    polyethylene glycol (GLYCOLAX) 17 gram PwPk    sevelamer carbonate (RENVELA) 800 mg Tab    sumatriptan (IMITREX) 100 MG tablet    SYNTHROID 50 mcg tablet    traZODone (DESYREL) 100 MG tablet     Family History     Problem Relation (Age of Onset)    ALS Mother    Cancer Maternal Grandmother, Paternal Grandfather, Brother    Diabetes Sister, Maternal Aunt, Maternal Uncle    Kidney disease Sister, Maternal Aunt    Prostate cancer Brother    Scoliosis Brother    "     Tobacco Use    Smoking status: Never Smoker    Smokeless tobacco: Never Used   Substance and Sexual Activity    Alcohol use: No     Alcohol/week: 0.0 standard drinks    Drug use: No    Sexual activity: Not Currently     Birth control/protection: Abstinence     Review of Systems   Constitutional: Negative.    HENT: Negative.    Eyes: Negative.    Respiratory: Negative.    Cardiovascular: Positive for leg swelling.   Gastrointestinal: Negative.    Endocrine: Negative.    Genitourinary: Negative.    Musculoskeletal: Negative.    Skin: Negative.    Neurological: Negative.    Psychiatric/Behavioral: Negative.      Objective:     Vital Signs (Most Recent):  Temp: 98.7 °F (37.1 °C) (01/21/22 1128)  Pulse: 86 (01/21/22 1128)  Resp: 20 (01/21/22 1128)  BP: 124/76 (01/21/22 1128)  SpO2: 98 % (01/21/22 1128)  O2 Device (Oxygen Therapy): room air (01/21/22 1128) Vital Signs (24h Range):  Temp:  [98.7 °F (37.1 °C)-99.1 °F (37.3 °C)] 98.7 °F (37.1 °C)  Pulse:  [] 86  Resp:  [11-22] 20  SpO2:  [95 %-98 %] 98 %  BP: (116-170)/(58-99) 124/76     Weight: 120.7 kg (266 lb) (01/21/22 1128)  Body mass index is 47.12 kg/m².  Body surface area is 2.32 meters squared.    No intake/output data recorded.    Physical Exam  Constitutional:       General: She is not in acute distress.     Appearance: She is obese.   HENT:      Mouth/Throat:      Mouth: Mucous membranes are moist.   Eyes:      General: No scleral icterus.     Extraocular Movements: Extraocular movements intact.      Pupils: Pupils are equal, round, and reactive to light.   Cardiovascular:      Rate and Rhythm: Normal rate and regular rhythm.   Pulmonary:      Effort: Pulmonary effort is normal. No respiratory distress.   Abdominal:      General: There is no distension.      Palpations: Abdomen is soft.   Musculoskeletal:         General: Deformity (RUE TDC) present.      Right lower leg: Edema present.      Left lower leg: Edema present.   Skin:     Coloration:  Skin is not jaundiced.   Neurological:      General: No focal deficit present.      Mental Status: She is alert.         Significant Labs:  All labs within the past 24 hours have been reviewed.    Significant Imaging:  Labs: Reviewed    Assessment/Plan:     ZAK (acute kidney injury)  -dialysis dependent ZAK secondary to septic shock 1 month prior  -likely now ESKD though prior baseline was 2.5   -MWF, RIJ TDC, , still makes lots of urine, [facility] dialysis unit Dr. Hurst  -missed dialysis wed due to transportation  -murillo catheter was declogged in ED with lots of urine output  -significant LE edema and hyponatremia from water intake  -not volume overloaded, no severe acidosis, no severe electrolyte abnormalities, no signs of uremia  -HD tomorrow          Emmanuel Hare MD  Nephrology  [facility] Dept

## 2022-01-22 NOTE — ASSESSMENT & PLAN NOTE
Dialysis dependent ZAK  Nephrology consulted, appreciate recs  Suprapubic murillo declogged in ED, see UTI  Low albumin and history of nephrotic syndrome    Plan:  - Trend Cr  - Strict I&Os  - Avoid nephrotoxic agents  - Renally adjust medications  - f/u prealbumin   - f/u P/C ratio

## 2022-01-22 NOTE — ASSESSMENT & PLAN NOTE
-dialysis dependent ZAK secondary to septic shock 1 month prior  -likely now ESKD though prior baseline was 2.5   -MWF, TOSHIA TDC, , still makes lots of urine, [facility] dialysis unit Dr. Hurst  -missed dialysis wed due to transportation  -murillo catheter was declogged in ED with lots of urine output  -significant LE edema and hyponatremia from water intake  -not volume overloaded, no severe acidosis, no severe electrolyte abnormalities, no signs of uremia  -HD tomorrow

## 2022-01-22 NOTE — ASSESSMENT & PLAN NOTE
OUTPATIENT ESTABLISHED PATIENT NOTE  Patient: JOSE MURILLO : 1985  Date of Service: 10/25/2017  CC: Follow-up    Assessment/Plan                                             ASSESSMENT & PLANS     LFT elevation likely to be r/t Fatty liver   • Weight loss is only safe way to mgt fatty liver disease.    • I recommend moderate, regular exercise as tolerated.  • Follow up with PCP for mgt of HLP. Statin therapy has been shown to be safe in patients with nonalcoholic fatty liver disease.  PCP is encouraged to have a low threshold for starting statins, despite potential side effects of LFT elevation w/ statins  • I encouraged pt to stay healthy in general, such as avoid smoking, recreation drugs, excessive acetaminophen or acetaminophen drugs as fatty liver, if not controlled, can develop into liver cirrhosis   • Although with limited evidence, Vitamin E 400 IU/day is recommended in patients who do NOT have diabetes or coronary artery disease   • Avoid any alcohol consumption, although some data are mixed, regarding risks and benefits of light/moderate alcohol consumption.   · Pt is to return in 6 months for repeat CMP. If at that time, pt's weight is reduced, and HLP/DM2 is improved, but there is no improvement in LFT abnormality, then a liver biopsy is to recommended to further evaluate other causes of LFT elevation.    Need for hepatitis A vaccination  · Get vaccinated for Hepatitis A.  This can done with us, PCP, or health department.  Pt  Expresses that he wants to get it done at PCP's office    Follow Up: Return in about 6 months (around 2018) for Recheck.      DISCUSSION:I spent 25 minutes face to face with pt and spend over 50% of time on counseling. The above plan was delineated in details with patient and all questions and concerns were answered.  Patient is also given contact information.  Patient is to return as scheduled or sooner if new problems arise.   Subjective                       Body mass index is 47.12 kg/m². Morbid obesity complicates all aspects of disease management from diagnostic modalities to treatment. Weight loss encouraged and health benefits explained to patient.     RD consulted  1500 calorie diabetic diet                                  SUBJECTIVE   History of Present Illness  Mr. Adiel Olmos is a 32 y.o. male is here for a follow-up on recent evaluation for LFT elevation.  Workup was negative for hepatitis infection as well as any autoimmune process.  LFT elevation appears to be related to fatty liver. He has IgA elevation.  However, if patient were to have any autoimmune process, IgA is expected to be low. Lipid panel in 2/2017 showed elevated triglycerides at 238.  Patient is being managed with diet modification and exercise and isn’t yet on medications for hyperlipidemia.  Pt reports of decreasing ETOH consumption as well.  Reports of drinking only about 2-3 drinks a week not     There is mentioning of Dr. Macias’s note, regarding patient is seeing  GI.  I clarify with patient to make sure that patient is not seeing 2 providers for the same issue.  Patient stated that he has not seen GI/hepatology at [facility] and that he has not been to  for many years.  Pt denies any abdominal pain, including RUQ abdominal pain.  Pt denies jaundice, pruritus, stool or urine color changes, palmar erythema, or any skin changes. No stigmata of liver disease.    No change in bowel habits. Pt denies dark black stools or bright red blood in the stools, in the toilet bowl, or on the toilet tissue.  No diarrhea or constipation.  Stool character and consistency have been normal.  Patient reports of recent new onset of shortness of breath.  Further workup with EKG, ordered by PCP, showed arrhythmia.  Patient is being scheduled for a stress test soon.  Recently started taking citrulline malate, which is a synthetic medication to increase blood flow as he is doing jiu-jitsus.      ROS:Review of Systems   Constitutional: Negative.    HENT: Negative.    Eyes: Negative.    Respiratory: Negative.    Cardiovascular: Negative.    Endocrine: Negative.    Genitourinary: Negative.    Musculoskeletal: Negative.    Skin:  Negative.    Allergic/Immunologic: Negative.    Neurological: Negative.    Hematological: Negative.    Psychiatric/Behavioral: Negative.    Subjective   PAST MED HX: Pt  has a past medical history of Anxiety; Fatigue; GERD (gastroesophageal reflux disease); Hyperlipidemia; Lactose intolerance; Lower back pain; and Vitamin D deficiency.  PAST SURG HX: Pt  has a past surgical history that includes Eye surgery.  FAM HX: family history includes Diabetes in his mother; VINCENT disease in his father; Hypertension in his father; Stomach cancer in his maternal grandfather; Thyroid disease in his mother.  SOC HX: Pt  reports that he has quit smoking. His smoking use included Cigarettes. He smoked 0.50 packs per day. He has never used smokeless tobacco. He reports that he does not drink alcohol or use illicit drugs.    Objective                                                           OBJECTIVE   Allergy: Pt is allergic to aspirin.  MEDS: No current outpatient prescriptions on file.  Lab Results   Component Value Date    WBC 6.95 07/07/2017    HGB 15.5 07/07/2017    HCT 45.1 07/07/2017    MCV 86.4 07/07/2017    MCHC 34.4 07/07/2017     (H) 07/07/2017     Lab Results   Component Value Date     08/09/2017    K 4.5 08/09/2017     08/09/2017    CO2 30.0 08/09/2017    BUN 14 08/09/2017    CREATININE 1.00 08/09/2017    GLUCOSE 106 (H) 08/09/2017    CALCIUM 9.6 08/09/2017    ANIONGAP 4.0 08/09/2017        Liver Profile     Lab Results   Component Value Date    AST 35 (H) 08/09/2017    AST 48 (H) 07/07/2017    AST 36 (H) 03/27/2017    AST 41 (H) 02/07/2017     Lab Results   Component Value Date    ALT 59 (H) 08/09/2017     (H) 07/07/2017    ALT 89 (H) 03/27/2017    ALT 72 (H) 02/07/2017     Lab Results   Component Value Date    ALKPHOS 67 08/09/2017    ALKPHOS 77 07/07/2017    ALKPHOS 65 03/27/2017    ALKPHOS 60 02/07/2017    GGT 50 08/09/2017    GGT 55 (H) 02/07/2017     Lab Results   Component Value Date     BILITOT 0.5 08/09/2017    BILITOT 0.3 07/07/2017    BILITOT 0.5 03/27/2017    BILITOT 0.5 02/07/2017    ALBUMIN 4.60 08/09/2017    ALBUMIN 4.90 07/07/2017    ALBUMIN 4.90 03/27/2017    ALBUMIN 4.60 02/07/2017    PROTEINTOT 7.8 08/09/2017     Lab Results   Component Value Date    LIPASE 43 07/07/2017    AMYLASE 41 07/07/2017     Albumin Globulin Ratio (Normal 1.5 - 2.5 g/dL)  Lab Results   Component Value Date    LABIL2 1.4 (L) 08/09/2017    LABIL2 1.7 07/07/2017    LABIL2 1.8 03/27/2017    LABIL2 1.4 (L) 02/07/2017      Alkaline Phosphatase, Isoenzymes   Lab Results   Component Value Date    LABLIVE <1.0 07/07/2017     Acute Hepatitis Panel  Lab Results   Component Value Date    HEPAIGM Non-Reactive 03/27/2017    HEPBSAG Non-Reactive 03/27/2017    HEPBIGMCORE Non-Reactive 03/27/2017    HEPCAB Non-Reactive 03/27/2017     Immunity Against Hep A and B  Lab Results   Component Value Date    HAV Negative 08/09/2017    HEPBSAB Reactive (A) 08/09/2017   TIBC (Normal 250 - 450 ug/dL)  Lab Results   Component Value Date    TIBC 303 08/09/2017     Serum Iron (Normal 38 - 169 ug/dL)  Lab Results   Component Value Date    IRON 100 08/09/2017     Transferrin or Iron Saturation % (Normal 20 - 50 %)  Lab Results   Component Value Date    LABIRON 33 08/09/2017     Ferritin (Normal 20.00 - 291.00 ng/mL)  Lab Results   Component Value Date    FERRITIN 256.00 08/09/2017    FERRITIN 295.00 07/07/2017     HLP  Lab Results   Component Value Date    TRIG 238 (H) 02/07/2017    HDL 38 (L) 02/07/2017    VLDL 47.6 02/07/2017     Autoimmune Markers  Lab Results   Component Value Date    ANADIRECT Negative 08/09/2017    ANADIRECT Negative 07/07/2017    ANADIRECT Negative 08/16/2016     Immunoglobulins Panel [IgG (IgG Subclasses), IgA, IgM]  (IgG 700 - 1600 mg/dL. IgA 91 - 414 mg/dL. IgM 40 - 230 mg/dL)  Lab Results   Component Value Date    TOTIGGREF 982 07/07/2017     (H) 08/09/2017     (H) 07/07/2017    IGM 90 07/07/2017      Celiac Panel  Lab Results   Component Value Date    GDPABIGA 10 08/09/2017    DEAMIGG 3 08/09/2017    TTRANSGLIGA <2 08/09/2017    TSUTRANIGG <2 08/09/2017     (H) 08/09/2017     IBD Panel  Lab Results   Component Value Date    ATYPPANC <1:20 07/07/2017    MYELOP <9.0 07/07/2017    PR3ABS <3.5 07/07/2017    CANCA <1:20 07/07/2017    PANCA <1:20 07/07/2017     Anti-Mitochondrial Antibody (AMA)   Lab Results   Component Value Date    MITOAB <20.0 07/07/2017     Anti-Smooth Muscle Antibody   Lab Results   Component Value Date    SMOOTHMUSCAB 6 07/07/2017     Liver-Kidney Antibody (Anti-microsomal Antibody)  Lab Results   Component Value Date    LABLIVE <1.0 07/07/2017     Alpha-1 Antitrypsin Phenotype and Alpha Tumor Marker  Lab Results   Component Value Date    AFPTM 122 08/09/2017    PHENOTYPE MM 08/09/2017     Ceruloplasmin (Normal 16.0 - 31.0 mg/dL)  Lab Results   Component Value Date    CERULOPLSM 29.0 07/07/2017         Wt Readings from Last 5 Encounters:   10/25/17 207 lb 9.6 oz (94.2 kg)   10/06/17 209 lb (94.8 kg)   08/09/17 203 lb 3.2 oz (92.2 kg)   07/07/17 206 lb (93.4 kg)   05/09/17 211 lb (95.7 kg)   body mass index is 28.16 kg/(m^2).,Temp: 96.9 °F (36.1 °C),BP: 118/72,Heart Rate: 68   Physical Exam  General Well developed; well nourished; no acute distress.   ENT Oral mucosa pink and moist without thrush or lesions.    GI Abd soft, NT, ND, normal active bowel sounds.  No HSM.  No abd hernia    Pt care team: Ashanti FONSECA & Monserrat Sinclair MA  [facility]--Gastroenterology  [phone]    CC: Dr.Cherie ABHIJIT Brar MD   [id number] / [place] [address] FAX:[phone]

## 2022-01-23 LAB
ANION GAP SERPL CALC-SCNC: 10 MMOL/L (ref 8–16)
BACTERIA UR CULT: ABNORMAL
BASOPHILS # BLD AUTO: 0.03 K/UL (ref 0–0.2)
BASOPHILS NFR BLD: 0.6 % (ref 0–1.9)
BUN SERPL-MCNC: 16 MG/DL (ref 8–23)
CALCIUM SERPL-MCNC: 8 MG/DL (ref 8.7–10.5)
CHLORIDE SERPL-SCNC: 99 MMOL/L (ref 95–110)
CO2 SERPL-SCNC: 25 MMOL/L (ref 23–29)
CREAT SERPL-MCNC: 1.3 MG/DL (ref 0.5–1.4)
CREAT UR-MCNC: 9 MG/DL (ref 15–325)
DIFFERENTIAL METHOD: ABNORMAL
EOSINOPHIL # BLD AUTO: 0.3 K/UL (ref 0–0.5)
EOSINOPHIL NFR BLD: 5.8 % (ref 0–8)
ERYTHROCYTE [DISTWIDTH] IN BLOOD BY AUTOMATED COUNT: 15 % (ref 11.5–14.5)
EST. GFR  (AFRICAN AMERICAN): 48.4 ML/MIN/1.73 M^2
EST. GFR  (NON AFRICAN AMERICAN): 42 ML/MIN/1.73 M^2
FERRITIN SERPL-MCNC: 812 NG/ML (ref 20–300)
FOLATE SERPL-MCNC: 4.1 NG/ML (ref 4–24)
GLUCOSE SERPL-MCNC: 147 MG/DL (ref 70–110)
HCT VFR BLD AUTO: 24.8 % (ref 37–48.5)
HGB BLD-MCNC: 7.8 G/DL (ref 12–16)
IMM GRANULOCYTES # BLD AUTO: 0.11 K/UL (ref 0–0.04)
IMM GRANULOCYTES NFR BLD AUTO: 2.1 % (ref 0–0.5)
IRON SERPL-MCNC: 23 UG/DL (ref 30–160)
LYMPHOCYTES # BLD AUTO: 1.6 K/UL (ref 1–4.8)
LYMPHOCYTES NFR BLD: 29 % (ref 18–48)
MAGNESIUM SERPL-MCNC: 1.5 MG/DL (ref 1.6–2.6)
MCH RBC QN AUTO: 28.9 PG (ref 27–31)
MCHC RBC AUTO-ENTMCNC: 31.5 G/DL (ref 32–36)
MCV RBC AUTO: 92 FL (ref 82–98)
MONOCYTES # BLD AUTO: 1 K/UL (ref 0.3–1)
MONOCYTES NFR BLD: 18.1 % (ref 4–15)
NEUTROPHILS # BLD AUTO: 2.4 K/UL (ref 1.8–7.7)
NEUTROPHILS NFR BLD: 44.4 % (ref 38–73)
NRBC BLD-RTO: 0 /100 WBC
PHOSPHATE SERPL-MCNC: 2.9 MG/DL (ref 2.7–4.5)
PLATELET # BLD AUTO: 212 K/UL (ref 150–450)
PMV BLD AUTO: 9.1 FL (ref 9.2–12.9)
POCT GLUCOSE: 191 MG/DL (ref 70–110)
POCT GLUCOSE: 194 MG/DL (ref 70–110)
POCT GLUCOSE: 219 MG/DL (ref 70–110)
POCT GLUCOSE: 235 MG/DL (ref 70–110)
POTASSIUM SERPL-SCNC: 3.4 MMOL/L (ref 3.5–5.1)
PROT UR-MCNC: 84 MG/DL (ref 0–15)
PROT/CREAT UR: 9.33 MG/G{CREAT} (ref 0–0.2)
RBC # BLD AUTO: 2.7 M/UL (ref 4–5.4)
SATURATED IRON: 16 % (ref 20–50)
SODIUM SERPL-SCNC: 134 MMOL/L (ref 136–145)
TOTAL IRON BINDING CAPACITY: 142 UG/DL (ref 250–450)
TRANSFERRIN SERPL-MCNC: 96 MG/DL (ref 200–375)
VIT B12 SERPL-MCNC: 590 PG/ML (ref 210–950)
WBC # BLD AUTO: 5.35 K/UL (ref 3.9–12.7)

## 2022-01-23 PROCEDURE — 82728 ASSAY OF FERRITIN: CPT | Mod: HCNC | Performed by: HOSPITALIST

## 2022-01-23 PROCEDURE — 84100 ASSAY OF PHOSPHORUS: CPT | Mod: HCNC

## 2022-01-23 PROCEDURE — 94760 N-INVAS EAR/PLS OXIMETRY 1: CPT | Mod: HCNC

## 2022-01-23 PROCEDURE — 82746 ASSAY OF FOLIC ACID SERUM: CPT | Mod: HCNC | Performed by: HOSPITALIST

## 2022-01-23 PROCEDURE — 97535 SELF CARE MNGMENT TRAINING: CPT | Mod: HCNC

## 2022-01-23 PROCEDURE — 63600175 PHARM REV CODE 636 W HCPCS: Mod: HCNC

## 2022-01-23 PROCEDURE — 83735 ASSAY OF MAGNESIUM: CPT | Mod: HCNC

## 2022-01-23 PROCEDURE — 80048 BASIC METABOLIC PNL TOTAL CA: CPT | Mod: HCNC

## 2022-01-23 PROCEDURE — 84156 ASSAY OF PROTEIN URINE: CPT | Mod: HCNC

## 2022-01-23 PROCEDURE — 84466 ASSAY OF TRANSFERRIN: CPT | Mod: HCNC | Performed by: HOSPITALIST

## 2022-01-23 PROCEDURE — G0378 HOSPITAL OBSERVATION PER HR: HCPCS | Mod: HCNC

## 2022-01-23 PROCEDURE — 27000221 HC OXYGEN, UP TO 24 HOURS: Mod: HCNC

## 2022-01-23 PROCEDURE — 63600175 PHARM REV CODE 636 W HCPCS: Mod: HCNC | Performed by: PHYSICIAN ASSISTANT

## 2022-01-23 PROCEDURE — 25000003 PHARM REV CODE 250: Mod: HCNC

## 2022-01-23 PROCEDURE — 85025 COMPLETE CBC W/AUTO DIFF WBC: CPT | Mod: HCNC

## 2022-01-23 PROCEDURE — 99900035 HC TECH TIME PER 15 MIN (STAT): Mod: HCNC

## 2022-01-23 PROCEDURE — 97161 PT EVAL LOW COMPLEX 20 MIN: CPT | Mod: HCNC

## 2022-01-23 PROCEDURE — 97530 THERAPEUTIC ACTIVITIES: CPT | Mod: HCNC

## 2022-01-23 PROCEDURE — 36415 COLL VENOUS BLD VENIPUNCTURE: CPT | Mod: HCNC | Performed by: HOSPITALIST

## 2022-01-23 PROCEDURE — 97165 OT EVAL LOW COMPLEX 30 MIN: CPT | Mod: HCNC

## 2022-01-23 PROCEDURE — 82607 VITAMIN B-12: CPT | Mod: HCNC | Performed by: HOSPITALIST

## 2022-01-23 RX ORDER — POTASSIUM CHLORIDE 750 MG/1
10 CAPSULE, EXTENDED RELEASE ORAL ONCE
Status: COMPLETED | OUTPATIENT
Start: 2022-01-23 | End: 2022-01-23

## 2022-01-23 RX ORDER — MEROPENEM AND SODIUM CHLORIDE 1 G/50ML
1 INJECTION, SOLUTION INTRAVENOUS
Status: DISCONTINUED | OUTPATIENT
Start: 2022-01-23 | End: 2022-01-24

## 2022-01-23 RX ORDER — MAGNESIUM SULFATE HEPTAHYDRATE 40 MG/ML
2 INJECTION, SOLUTION INTRAVENOUS ONCE
Status: COMPLETED | OUTPATIENT
Start: 2022-01-23 | End: 2022-01-23

## 2022-01-23 RX ADMIN — MEROPENEM AND SODIUM CHLORIDE 1 G: 1 INJECTION, SOLUTION INTRAVENOUS at 01:01

## 2022-01-23 RX ADMIN — ANASTROZOLE 1 MG: 1 TABLET, COATED ORAL at 09:01

## 2022-01-23 RX ADMIN — METOPROLOL TARTRATE 25 MG: 25 TABLET, FILM COATED ORAL at 08:01

## 2022-01-23 RX ADMIN — FUROSEMIDE 160 MG: 80 TABLET ORAL at 09:01

## 2022-01-23 RX ADMIN — CLONIDINE HYDROCHLORIDE 0.2 MG: 0.2 TABLET ORAL at 08:01

## 2022-01-23 RX ADMIN — OXYBUTYNIN CHLORIDE 10 MG: 10 TABLET, FILM COATED, EXTENDED RELEASE ORAL at 09:01

## 2022-01-23 RX ADMIN — POTASSIUM CHLORIDE 10 MEQ: 10 CAPSULE, COATED, EXTENDED RELEASE ORAL at 09:01

## 2022-01-23 RX ADMIN — LEVOTHYROXINE SODIUM 50 MCG: 50 TABLET ORAL at 06:01

## 2022-01-23 RX ADMIN — CLONIDINE HYDROCHLORIDE 0.2 MG: 0.2 TABLET ORAL at 03:01

## 2022-01-23 RX ADMIN — METOPROLOL TARTRATE 25 MG: 25 TABLET, FILM COATED ORAL at 09:01

## 2022-01-23 RX ADMIN — FAMOTIDINE 20 MG: 20 TABLET ORAL at 09:01

## 2022-01-23 RX ADMIN — OXYCODONE HYDROCHLORIDE 10 MG: 10 TABLET ORAL at 03:01

## 2022-01-23 RX ADMIN — FUROSEMIDE 160 MG: 80 TABLET ORAL at 08:01

## 2022-01-23 RX ADMIN — ATORVASTATIN CALCIUM 80 MG: 20 TABLET, FILM COATED ORAL at 09:01

## 2022-01-23 RX ADMIN — MEROPENEM AND SODIUM CHLORIDE 1 G: 1 INJECTION, SOLUTION INTRAVENOUS at 10:01

## 2022-01-23 RX ADMIN — INSULIN ASPART 1 UNITS: 100 INJECTION, SOLUTION INTRAVENOUS; SUBCUTANEOUS at 08:01

## 2022-01-23 RX ADMIN — APIXABAN 2.5 MG: 2.5 TABLET, FILM COATED ORAL at 09:01

## 2022-01-23 RX ADMIN — CLONIDINE HYDROCHLORIDE 0.2 MG: 0.2 TABLET ORAL at 09:01

## 2022-01-23 RX ADMIN — Medication 6 MG: at 08:01

## 2022-01-23 RX ADMIN — SENNOSIDES AND DOCUSATE SODIUM 1 TABLET: 50; 8.6 TABLET ORAL at 09:01

## 2022-01-23 RX ADMIN — MAGNESIUM SULFATE IN WATER 2 G: 40 INJECTION, SOLUTION INTRAVENOUS at 09:01

## 2022-01-23 RX ADMIN — OXYCODONE HYDROCHLORIDE 10 MG: 10 TABLET ORAL at 09:01

## 2022-01-23 RX ADMIN — INSULIN ASPART 2 UNITS: 100 INJECTION, SOLUTION INTRAVENOUS; SUBCUTANEOUS at 06:01

## 2022-01-23 RX ADMIN — OXYCODONE HYDROCHLORIDE 10 MG: 10 TABLET ORAL at 08:01

## 2022-01-23 RX ADMIN — SENNOSIDES AND DOCUSATE SODIUM 1 TABLET: 50; 8.6 TABLET ORAL at 08:01

## 2022-01-23 RX ADMIN — APIXABAN 2.5 MG: 2.5 TABLET, FILM COATED ORAL at 08:01

## 2022-01-23 NOTE — SUBJECTIVE & OBJECTIVE
Interval History: NAEON. Afebrile, HD stable. HD yesterday. UCx with ESBL and transitioned to meropenem. SW consulted to ensure proper transportation with HD as outpatient.    Review of Systems   Constitutional: Negative for chills and fever.   HENT: Negative for congestion, nosebleeds and sore throat.    Eyes: Negative for photophobia and pain.   Respiratory: Negative for cough, chest tightness and shortness of breath.    Cardiovascular: Negative for chest pain and palpitations.   Gastrointestinal: Negative for abdominal distention, blood in stool, diarrhea, nausea and vomiting.   Genitourinary: Negative for dysuria and hematuria.        Sx similar to UTIs in past   Musculoskeletal: Negative for back pain and myalgias.   Skin: Negative for rash and wound.   Neurological: Positive for weakness and headaches. Negative for dizziness and numbness.   Psychiatric/Behavioral: Negative for behavioral problems and confusion. The patient is not nervous/anxious.      Objective:     Vital Signs (Most Recent):  Temp: 97.8 °F (36.6 °C) (01/23/22 1208)  Pulse: 100 (01/23/22 1208)  Resp: 18 (01/23/22 1208)  BP: 126/60 (01/23/22 1208)  SpO2: (!) 93 % (01/23/22 1208) Vital Signs (24h Range):  Temp:  [97.8 °F (36.6 °C)-98.6 °F (37 °C)] 97.8 °F (36.6 °C)  Pulse:  [] 100  Resp:  [12-18] 18  SpO2:  [93 %-100 %] 93 %  BP: (126-158)/(60-86) 126/60     Weight: 120.7 kg (265 lb 15.8 oz)  Body mass index is 47.12 kg/m².    Intake/Output Summary (Last 24 hours) at 1/23/2022 1230  Last data filed at 1/22/2022 1900  Gross per 24 hour   Intake --   Output 1950 ml   Net -1950 ml      Physical Exam  Vitals and nursing note reviewed.   Constitutional:       General: She is not in acute distress.     Appearance: Normal appearance. She is obese. She is not ill-appearing.   HENT:      Head: Normocephalic and atraumatic.      Right Ear: External ear normal.      Left Ear: External ear normal.      Nose: Nose normal.      Mouth/Throat:      Mouth:  Mucous membranes are moist.      Pharynx: Oropharynx is clear.   Eyes:      General: No scleral icterus.     Extraocular Movements: Extraocular movements intact.      Pupils: Pupils are equal, round, and reactive to light.   Cardiovascular:      Rate and Rhythm: Normal rate and regular rhythm.      Pulses: Normal pulses.      Heart sounds: Normal heart sounds. No murmur heard.  No gallop.    Pulmonary:      Effort: Pulmonary effort is normal. No respiratory distress.      Breath sounds: Normal breath sounds. No rales.      Comments: Right TDC  Abdominal:      General: Abdomen is flat. Bowel sounds are normal. There is no distension.      Palpations: Abdomen is soft.      Tenderness: There is no abdominal tenderness.      Comments: Suprapubic catheter   Musculoskeletal:         General: No swelling. Normal range of motion.      Cervical back: Normal range of motion and neck supple. No rigidity or tenderness.      Right lower leg: Edema present.      Left lower leg: Edema present.   Skin:     General: Skin is warm and dry.      Capillary Refill: Capillary refill takes less than 2 seconds.      Coloration: Skin is not jaundiced.      Findings: No rash.   Neurological:      General: No focal deficit present.      Mental Status: She is alert and oriented to person, place, and time. Mental status is at baseline.      Motor: Weakness present.   Psychiatric:         Mood and Affect: Mood normal.         Behavior: Behavior normal.         Significant Labs: All pertinent labs within the past 24 hours have been reviewed.    Significant Imaging: I have reviewed all pertinent imaging results/findings within the past 24 hours.

## 2022-01-23 NOTE — ASSESSMENT & PLAN NOTE
History of ESBL and MDRO with current UTI symptoms, dirty UA, and suprapubic catheter.    Plan  - consult ID, appreciate recs  - transition ertapenem to meropenem  -- midline consult  - UCx ESBL

## 2022-01-23 NOTE — PT/OT/SLP EVAL
Occupational Therapy   Evaluation/tx    Name: Cecile Bowen  MRN: [id number]  Admitting Diagnosis:  Dialysis patient  Recent Surgery: * No surgery found *      Recommendations:     Discharge Recommendations: nursing facility, skilled  Discharge Equipment Recommendations:  none  Barriers to discharge:  Other (Comment) (requires increased assist)    Assessment:     Cecile Bowen is a 71 y.o. female with a medical diagnosis of Dialysis patient.  She presents with deficits in functional mobility as well as ADL task performance. Pt. Required Max A to scoot along EOB on this date with fatigue noted following task of 3 scoots to HOB. Pt. Was able to sit EOB with SBA to perform grooming tasks. Pt. Was unable to fully clear buttocks off bed with attempted at standing with 1 person assist on this date. Performance deficits affecting function: weakness,impaired endurance,impaired self care skills,impaired functional mobilty,decreased lower extremity function,decreased upper extremity function,decreased ROM.   Pt. Would benefit from continued OT services to maximize safety and I with ADL tasks.     Rehab Prognosis: Good; patient would benefit from acute skilled OT services to address these deficits and reach maximum level of function.       Plan:     Patient to be seen 3 x/week to address the above listed problems via self-care/home management,therapeutic activities,therapeutic exercises  · Plan of Care Expires: 02/22/22  · Plan of Care Reviewed with: patient    Subjective     Chief Complaint: Being weak and having difficulty since recent d/c home with transportation problems to HD  Patient/Family Comments/goals: To be able to walk again    Occupational Profile:  Living Environment: Pt. Resides with her sister in  house with no steps. Pt. Has been taking baths in lift chair  Previous level of function: Since most recent d/c pt. Has been staying in the lift chair and is able to feed self, perform UBD, and groom. Pt. Rolls side  to side to change padding in chair when she is soiled.  Roles and Routines: partial caretaker of self, sister   Equipment Used at Home:  lift device,wheelchair,slide board (lift chair)  Assistance upon Discharge: sister     Pain/Comfort:  · Pain Rating 1: 0/10  · Pain Rating Post-Intervention 1: 0/10    Patients cultural, spiritual, Sikhism conflicts given the current situation: no    Objective:     Communicated with: nurse prior to session.  Patient found supine with murillo catheter,peripheral IV upon OT entry to room. Nurse present    General Precautions: Standard, fall,aspiration,diabetic,contact   Orthopedic Precautions:N/A   Braces: N/A  Respiratory Status: Room air    Occupational Performance:    Bed Mobility:    · Patient completed Rolling/Turning to Left with  minimum assistance  · Patient completed Rolling/Turning to Right with moderate assistance  · Patient completed Scooting/Bridging with maximal assistance along EOB to HOB x 3 trials  · Patient completed Supine to Sit with moderate assistance  · Patient completed Sit to Supine with moderate assistance    Functional Mobility/Transfers:  · Patient completed Sit <> Stand Transfer with maximal assistance  with  no assistive device but unable to  Fully clear buttocks from edge of bed  ·     Activities of Daily Living:  · Grooming: stand by assistance seated EOB to comb hair, brush teeth and wash face    Cognitive/Visual Perceptual:  Cognitive/Psychosocial Skills:     -       Oriented to: Person, Place, Time and Situation   -       Follows Commands/attention:Follows multistep  commands  -       Communication: clear/fluent  -       Memory: No Deficits noted  -       Safety awareness/insight to disability: intact   -       Mood/Affect/Coping skills/emotional control: Appropriate to situation  Visual/Perceptual:      -wears glasses    Physical Exam:  Balance: -       sit: SBA   Postural examination/scapula alignment:    -       Rounded shoulders  -        Forward head  -       Posterior pelvic tilt  Skin integrity: Visible skin intact  Dominant hand: -       right  Upper Extremity Range of Motion:     -       Right Upper Extremity: Deficits: at shoulder 2/2 RTC injury;elbow and below WFL  -       Left Upper Extremity: WFL   Strength:    -       Right Upper Extremity: WFL  -       Left Upper Extremity: WFL    AMPAC 6 Click ADL:  AMPA Total Score: 16    Treatment & Education:  Pt. Educated on role of OT and pOC  Pt. Educated on safety with mobility and ADL tasks seated EOB  Education:    Patient left supine with all lines intact, call button in reach and nurse present    GOALS:   Multidisciplinary Problems     Occupational Therapy Goals        Problem: Occupational Therapy Goal    Goal Priority Disciplines Outcome Interventions   Occupational Therapy Goal     OT, PT/OT     Description: Goals to be met by: 02-03-22     Patient will increase functional independence with ADLs by performing:    UE Dressing with Set-up Assistance.  Grooming while seated with Set-up Assistance.  Toileting from bedside commode with Moderate Assistance for hygiene and clothing management using drop arm commode and slide board  Sitting at edge of bed x15 minutes with Modified [place].  Supine to sit with Minimal Assistance.  Slide board  transfers with Minimal Assistance.  Pt. To be I with HEP to BUE to improve level of endurance (RUE with RTC injury)  Pt. To perform sit to stand from EOB with Mod A                     History:     Past Medical History:   Diagnosis Date    Cervicogenic migraine     Chronic pain     CKD (chronic kidney disease) stage 4, GFR 15-29 ml/min     [facility]    CKD (chronic kidney disease) stage 4, GFR 15-29 ml/min     Diabetes mellitus     Long term use of Insulin, Diabetic Neuropathy    Fibromyalgia     Hydronephrosis     Hyperlipidemia     Hypertension 12/12/2012    Hypothyroidism 12/12/2012    ARON (iron deficiency anemia)     Insomnia      Levoscoliosis     Malignant neoplasm of upper-outer quadrant of left breast in female, estrogen receptor positive     Metabolic acidosis     Mobility impaired     Nuclear sclerosis - Both Eyes 3/24/2014    VIJAYA (obstructive sleep apnea)     Osteopenia     Pulmonary nodule     Recurrent UTI     Renal manifestation of secondary diabetes mellitus     Secondary hyperparathyroidism, renal     Urinary retention        Past Surgical History:   Procedure Laterality Date    BREAST BIOPSY Right     benign    CHOLECYSTECTOMY      COLONOSCOPY N/A 1/13/2017    Procedure: COLONOSCOPY;  Surgeon: Morris Wiseman MD;  Location: [facility] (4TH FLR);  Service: Endoscopy;  Laterality: N/A;  Renal pt Nephrology advised to avoid phosphate preps    CYSTOSCOPY N/A 10/8/2018    Procedure: CYSTOSCOPY;  Surgeon: Ronel Whittington MD;  [facility];  Service: Urology;  Laterality: N/A;  45 min    CYSTOSCOPY N/A 3/25/2019    Procedure: CYSTOSCOPY;  Surgeon: Ronel Whittington MD;  [facility];  Service: Urology;  Laterality: N/A;  45 min    CYSTOSCOPY N/A 8/26/2019    Procedure: CYSTOSCOPY;  Surgeon: Ronel Whittington MD;  [facility];  Service: Urology;  Laterality: N/A;  45 min    CYSTOSCOPY N/A 7/2/2021    Procedure: CYSTOSCOPY;  Surgeon: Ronel Whittington MD;  [facility];  Service: Urology;  Laterality: N/A;    CYSTOSCOPY  12/23/2021    Procedure: CYSTOSCOPY;  Surgeon: Ronle Whittington MD;  [facility];  Service: Urology;;    CYSTOSCOPY W/ URETERAL STENT PLACEMENT Left 5/15/2021    Procedure: CYSTOSCOPY, WITH URETERAL STENT INSERTION;  Surgeon: Levy Sánchez Jr., MD;  [facility];  Service: Urology;  Laterality: Left;    CYSTOSCOPY WITH BIOPSY OF BLADDER N/A 1/27/2020    Procedure: CYSTOSCOPY, WITH BLADDER BIOPSY, WITH FULGURATION IF INDICATED;  Surgeon: Ronel Whittington MD;  [facility];   Service: Urology;  Laterality: N/A;    DILATION AND CURETTAGE OF UTERUS      GALLBLADDER SURGERY  2006    HYSTERECTOMY      INJECTION FOR SENTINEL NODE IDENTIFICATION Left 8/1/2019    Procedure: INJECTION, FOR SENTINEL NODE IDENTIFICATION;  Surgeon: Samia Fulton MD;  Location: [facility] OR [address] [place] [address];  Service: General;  Laterality: Left;    INJECTION OF BOTULINUM TOXIN TYPE A N/A 10/8/2018    Procedure: INJECTION, BOTULINUM TOXIN, TYPE A 300 UNITS;  Surgeon: Ronel Whittington MD;  Location: [address];  Service: Urology;  Laterality: N/A;    INJECTION OF BOTULINUM TOXIN TYPE A N/A 3/25/2019    Procedure: INJECTION, BOTULINUM TOXIN, TYPE A 300 UNITS;  Surgeon: Ronel Whittington MD;  Location: [facility] OR [address] [place] [address];  Service: Urology;  Laterality: N/A;    INJECTION OF BOTULINUM TOXIN TYPE A N/A 8/26/2019    Procedure: INJECTION, BOTULINUM TOXIN, TYPE A 300 UNITS;  Surgeon: Ronel Whittington MD;  Location: [address];  Service: Urology;  Laterality: N/A;    MASTECTOMY Left 8/1/2019    Procedure: MASTECTOMY 23 hour stay;  Surgeon: Samia Fulton MD;  Location: [address];  Service: General;  Laterality: Left;    OVARIAN CYST SURGERY  1985    REPLACEMENT OF STENT Left 12/23/2021    Procedure: REPLACEMENT, STENT;  Surgeon: Ronel Whittington MD;  Location: [address];  Service: Urology;  Laterality: Left;    SENTINEL LYMPH NODE BIOPSY Left 8/1/2019    Procedure: BIOPSY, LYMPH NODE, SENTINEL;  Surgeon: Samia Fulton MD;  Location: [address];  Service: General;  Laterality: Left;    spt placement      TONSILLECTOMY, ADENOIDECTOMY      TOTAL ABDOMINAL HYSTERECTOMY W/ BILATERAL SALPINGOOPHORECTOMY  1985       Time Tracking:     OT Date of Treatment: 01/23/22  OT Start Time: 0917  OT Stop Time: 0947  OT Total Time (min): 30 min    Billable Minutes:Evaluation 15  Self Care/Home Management 15    1/23/2022

## 2022-01-23 NOTE — ASSESSMENT & PLAN NOTE
72 yo female with Hx MDRO UTI, L renal stones and stent with SP catheter secondary to neurogenic bladder and obstruction admitted for HD with cf UTI as urine appeared purulent.  - UA x 2 positive but patient without SP pain or systemic sign of infection  - prior UCX last admit in Dec with ESBL Kleb pneumo - ertapenem and cipro sensitive  - UCX now showing ESBL Kleb Pneumo (intermediate to ertapenem) - meropenem sensitive  - concern for infection given amount of RBCs on UA  - Stable non septic    Plan:  1. DC ertapenem  2. Start renal adjusted meropenem  3. Contact isolation given prior ESBL  4. Please exchange murillo as likely colonized, be a retained nidus for infection and re seed urine once abx stopped.   5. Will follow

## 2022-01-23 NOTE — SUBJECTIVE & OBJECTIVE
Interval History:   No AEON.  Afebrile and WBC WNL  UCx with ESBL Klep Pneumo - intermediate to ertapenem - Merrem and Bactrim sensitive only  The patient denies any recent fever, chills, or sweats.      Review of Systems   Constitutional: Negative for activity change, chills, diaphoresis and fever.   Respiratory: Negative for cough, shortness of breath and wheezing.    Cardiovascular: Negative for chest pain.   Gastrointestinal: Negative for abdominal pain, constipation, diarrhea, nausea and vomiting.   Genitourinary: Negative for dysuria, frequency and urgency.   Neurological: Negative for dizziness.   Hematological: Does not bruise/bleed easily.     Objective:     Vital Signs (Most Recent):  Temp: 97.9 °F (36.6 °C) (01/23/22 0727)  Pulse: 106 (01/23/22 0745)  Resp: 17 (01/23/22 0937)  BP: (!) 156/67 (01/23/22 0727)  SpO2: 96 % (01/23/22 0745) Vital Signs (24h Range):  Temp:  [97.9 °F (36.6 °C)-98.6 °F (37 °C)] 97.9 °F (36.6 °C)  Pulse:  [] 106  Resp:  [12-20] 17  SpO2:  [94 %-100 %] 96 %  BP: (144-184)/(36-86) 156/67     Weight: 120.7 kg (265 lb 15.8 oz)  Body mass index is 47.12 kg/m².    Estimated Creatinine Clearance: 51.4 mL/min (based on SCr of 1.3 mg/dL).    Physical Exam  Constitutional:       General: She is not in acute distress.     Appearance: She is well-developed. She is obese. She is not ill-appearing, toxic-appearing or diaphoretic.   HENT:      Head: Normocephalic and atraumatic.   Cardiovascular:      Rate and Rhythm: Normal rate and regular rhythm.      Heart sounds: Normal heart sounds. No murmur heard.  No friction rub. No gallop.    Pulmonary:      Effort: Pulmonary effort is normal. No respiratory distress.      Breath sounds: Normal breath sounds. No wheezing or rales.   Abdominal:      General: Bowel sounds are normal. There is no distension.      Palpations: Abdomen is soft. There is no mass.      Tenderness: There is no abdominal tenderness. There is no guarding or rebound.    Skin:     General: Skin is warm and dry.   Neurological:      Mental Status: She is alert and oriented to person, place, and time.   Psychiatric:         Behavior: Behavior normal.     Pics 1/22              Significant Labs:   Blood Culture:   Recent Labs   Lab 09/29/21 2206 11/28/21 2121 11/28/21 2122 12/06/21  0758 12/06/21  0802   LABBLOO No growth after 5 days. Gram stain aer bottle: Gram positive cocci in clusters resembling Staph   Gram stain zelda bottle: Gram negative rods   Results called to and read back by: Love Montemayor, Charge Nurse   11/29/2021  20:32  COAGULASE-NEGATIVE STAPHYLOCOCCUS SPECIES  Organism is a probable contaminant  *  CLOSTRIDIUM SPOROGENES* No growth after 5 days. No growth after 5 days. No growth after 5 days.     CBC:   Recent Labs   Lab 01/21/22  1235 01/22/22  0403 01/23/22  0332   WBC 6.49 6.58 5.35   HGB 8.9* 7.7* 7.8*   HCT 27.7* 24.5* 24.8*    263 212     CMP:   Recent Labs   Lab 01/21/22  1235 01/21/22  1235 01/21/22  2019 01/22/22  0322 01/23/22  0332   *   < > 129* 131* 134*   K 3.4*   < > 3.6 3.8 3.4*   CL 90*   < > 95 97 99   CO2 27   < > 25 21* 25   *   < > 197* 127* 147*   BUN 31*   < > 31* 31* 16   CREATININE 2.2*   < > 2.0* 2.0* 1.3   CALCIUM 8.7   < > 8.2* 8.3* 8.0*   PROT 6.4  --   --   --   --    ALBUMIN 1.8*  --   --  1.5*  --    BILITOT 0.3  --   --   --   --    ALKPHOS 163*  --   --   --   --    AST 12  --   --   --   --    ALT 16  --   --   --   --    ANIONGAP 9   < > 9 13 10   EGFRNONAA 22.2*   < > 24.9* 24.9* 42.0*    < > = values in this interval not displayed.     Urine Culture:   Recent Labs   Lab 09/28/21 1730 11/28/21  1940 12/20/21  1928 01/20/22  2216 01/21/22  1402   LABURIN ENTEROCOCCUS FAECALIS  > 100,000 cfu/ml  * Multiple organisms isolated. None in predominance.  Repeat if  clinically necessary. KLEBSIELLA PNEUMONIAE ESBL  >100,000 cfu/ml  No other significant isolate  * KLEBSIELLA PNEUMONIAE ESBL  >100,000 cfu/ml  *  GRAM NEGATIVE FERNANDEZ  > 100,000 cfu/ml  Identification and susceptibility pending  *     Urine Studies:   Recent Labs   Lab 09/28/21  1730 11/28/21  1940 01/21/22  1402   COLORU Yellow   < > Yellow   APPEARANCEUA Cloudy*   < > Cloudy*   PHUR 6.0   < > 6.0   SPECGRAV 1.020   < > 1.010   PROTEINUA 3+*   < > 2+*   GLUCUA 2+*   < > 1+*   KETONESU Trace*   < > Negative   BILIRUBINUA Negative   < > Negative   OCCULTUA 2+*   < > 2+*   NITRITE Positive*   < > Negative   LEUKOCYTESUR 3+*   < > 3+*   RBCUA 17*   < > 80*   WBCUA >100*   < > >100*   BACTERIA Occasional   < > Many*   SQUAMEPITHEL 0  --   --    HYALINECASTS 0   < > 1    < > = values in this interval not displayed.     All pertinent labs within the past 24 hours have been reviewed.    Significant Imaging: I have reviewed all pertinent imaging results/findings within the past 24 hours.   X-Ray Chest AP Portable [[id number]] Resulted: 01/21/22 1334   Order Status: Completed Updated: 01/21/22 1336   Narrative:     EXAMINATION:   XR CHEST AP PORTABLE     CLINICAL HISTORY:   chest discomfort;     TECHNIQUE:   Single frontal view of the chest was performed.     COMPARISON:   01/20/2022     FINDINGS:   Again noted is right diaphragmatic elevation with a meniscus sign, likely indicating at least a small volume right pleural effusion.  The left lung field and pleural space remains clear.  The heart size appears normal.  There is mild calcification of the aortic knob consistent with atherosclerotic stigmata.  Tunneled right hemodialysis catheter noted.    Impression:       No interval detrimental change identified.       Electronically signed by: Rosaline Farah   Date: 01/21/2022   Time: 13:34       Imaging History    2022  Date Procedure Name Status Accession Number Location   01/21/22 01:21 PM X-Ray Chest AP Portable Final [id number] [facility]   01/20/22 09:44 PM X-Ray Chest AP Portable Final [id number] [facility]   2021  Date Procedure Name Status Accession Number Location   12/23/21 10:33  AM [facility] Fluoro Usage Final [id number] [facility]

## 2022-01-23 NOTE — ASSESSMENT & PLAN NOTE
Chronic hyponatremia 130 baseline, 126 on admission  Secondary to fluid overload, UTI  Nephro consulted for HD

## 2022-01-23 NOTE — PT/OT/SLP EVAL
"Physical Therapy  Evaluation    Patient Name:  Cecile Bowen   MRN:  [id number]    Recommendations:     Discharge Recommendations:  nursing facility, skilled   Discharge Equipment Recommendations: none   Barriers to discharge: Decreased caregiver support    Assessment:       Cecile Bowen is a 71 y.o. female admitted with a medical diagnosis of Dialysis patient.  She presents with the following impairments/functional limitations:  weakness,impaired endurance,impaired self care skills,impaired functional mobilty,gait instability,decreased lower extremity function,pain.        Patient demonstrates decreased independence secondary to generalized weakness and decreased endurance.  Patient required mod A for bed mobility but requesting to hold sitting edge of bed secondary to increased fatigue following earlier OT and poor sleep overnight.  Patient participated increased rolling to don air pad mattress.  Patient will benefit from skilled PT in an acute care and skilled nursing setting to decreased burden of care in home environment.      Rehab Prognosis: Good; patient would benefit from acute skilled PT services 3 x/week to address these deficits and reach maximum level of function.  Patient is most appropriate to go to nursing facility, skilled .  Recent Surgery: * No surgery found *      Plan:     During this hospitalization, patient to be seen 3 x/week to address the identified rehab impairments via gait training,therapeutic activities,therapeutic exercises,neuromuscular re-education,wheelchair management/training and progress toward the following goals:    · Plan of Care Expires:  02/22/22    Subjective     Subjective:"I really want to be able to stand and take 2-3 steps to my scooter."  Pain/Comfort:  · Pain Rating 1: 2/10  · Location - Orientation 1: generalized  · Location 1: gluteal  · Pain Addressed 1: Pre-medicate for activity,Reposition,Cessation of Activity  · Pain Rating Post-Intervention 1:  (did not " rate)    Patients cultural, spiritual, Zoroastrianism conflicts given the current situation: no    Living Environment:  Prior level of function: Patient was at [facility] from 12/30 - 1/17 and reports worked on sliding board transfers and exercises.  Patient reports she has ray lift at home since discharge.  Patient has had increased difficulty getting to dialysis treatments since discharge from SNF.  Residence: lives with their family 1-story house/ trailer, number of outside stairs: 2-3 steps   Support available upon discharge: family (sister)  Equipment owned: lift device,wheelchair,slide board (scooter, lift chair, Ray lift)  Objective:     Communicated with nursing prior to session.  Patient found supine with murillo catheter,telemetry,peripheral IV  upon PT entry to room.    General Precautions: Standard, fall,contact   Orthopedic Precautions:N/A   Braces: N/A     Exams:  · RLE ROM: ~ 75% of functional range  · RLE Strength: grossly 3+/5  · LLE ROM: ~ 75% of functional range  · LLE Strength: grossly 3+/5  · Cognitive Exam:  Patient is oriented to Person, Place, Time and Situation      Functional Mobility:  · Bed Mobility:     · Rolling Left:  moderate assistance   · Rolling Right: moderate assistance   · Transfers:  Deferred sitting out of bed secondary to fatigue.      Therapeutic Activities and Exercises:   Patient participated in increased rolling to change sheets and place air pad mattress.  Patient declining sitting edge of bed following rolling secondary to increased fatigue following activity and OT in AM.      Exercises: Patient performed 1 set(s) of 5 repetitions of  the following bed level exercises: bridging and straight leg raises for bilateral LE. Patient required skilled PT for instruction of exercises and appropriate cues to perform exercises safely and appropriately.      Patient Education:    Patient educated on Fall risk, home safety, Home exercise program and plan of care by explanation.   Patient was receptive to education and needs reinforcement.     AM-PAC 6 CLICK MOBILITY  Total Score:9     Patient left supine with all lines intact and call button in reach.    GOALS:   Multidisciplinary Problems     Physical Therapy Goals        Problem: Physical Therapy Goal    Goal Priority Disciplines Outcome Goal Variances Interventions   Physical Therapy Goal     PT, PT/OT Ongoing, Progressing     Description: Goals to be met by: 22    Patient will increase functional independence with mobility by performin. Supine to sit with MInimal Assistance  2. Sit to supine with MInimal Assistance  3. Sit to stand transfer with Maximum Assistance  4.Patient will demonstrate independence with a home exercise program  5. Bed to chair transfer with Contact Guard Assistance using Slideboard                   History:     Past Medical History:   Diagnosis Date    Cervicogenic migraine     Chronic pain     CKD (chronic kidney disease) stage 4, GFR 15-29 ml/min     [facility]    CKD (chronic kidney disease) stage 4, GFR 15-29 ml/min     Diabetes mellitus     Long term use of Insulin, Diabetic Neuropathy    Fibromyalgia     Hydronephrosis     Hyperlipidemia     Hypertension 2012    Hypothyroidism 2012    ARON (iron deficiency anemia)     Insomnia     Levoscoliosis     Malignant neoplasm of upper-outer quadrant of left breast in female, estrogen receptor positive     Metabolic acidosis     Mobility impaired     Nuclear sclerosis - Both Eyes 3/24/2014    VIJAYA (obstructive sleep apnea)     Osteopenia     Pulmonary nodule     Recurrent UTI     Renal manifestation of secondary diabetes mellitus     Secondary hyperparathyroidism, renal     Urinary retention        Past Surgical History:   Procedure Laterality Date    BREAST BIOPSY Right     benign    CHOLECYSTECTOMY      COLONOSCOPY N/A 2017    Procedure: COLONOSCOPY;  Surgeon: Morris Wiseman MD;  Location: [address]  FLR);  Service: Endoscopy;  Laterality: N/A;  Renal pt Nephrology advised to avoid phosphate preps    CYSTOSCOPY N/A 10/8/2018    Procedure: CYSTOSCOPY;  Surgeon: Ronel Whittington MD;  Location: [facility] OR 1ST FLR;  Service: Urology;  Laterality: N/A;  45 min    CYSTOSCOPY N/A 3/25/2019    Procedure: CYSTOSCOPY;  Surgeon: Ronel Whittington MD;  Location: [facility] OR 1ST FLR;  Service: Urology;  Laterality: N/A;  45 min    CYSTOSCOPY N/A 8/26/2019    Procedure: CYSTOSCOPY;  Surgeon: Ronel Whittington MD;  Location: [facility] OR 1ST FLR;  Service: Urology;  Laterality: N/A;  45 min    CYSTOSCOPY N/A 7/2/2021    Procedure: CYSTOSCOPY;  Surgeon: Ronel Whittington MD;  [facility];  Service: Urology;  Laterality: N/A;    CYSTOSCOPY  12/23/2021    Procedure: CYSTOSCOPY;  Surgeon: Ronel Whittington MD;  [facility];  Service: Urology;;    CYSTOSCOPY W/ URETERAL STENT PLACEMENT Left 5/15/2021    Procedure: CYSTOSCOPY, WITH URETERAL STENT INSERTION;  Surgeon: Levy Sánchez Jr., MD;  Location: [facility] OR [address] [place] [address];  Service: Urology;  Laterality: Left;    CYSTOSCOPY WITH BIOPSY OF BLADDER N/A 1/27/2020    Procedure: CYSTOSCOPY, WITH BLADDER BIOPSY, WITH FULGURATION IF INDICATED;  Surgeon: Ronel Whittington MD;  Location: [facility] OR [address] [place] [address];  Service: Urology;  Laterality: N/A;    DILATION AND CURETTAGE OF UTERUS      GALLBLADDER SURGERY  2006    HYSTERECTOMY      INJECTION FOR SENTINEL NODE IDENTIFICATION Left 8/1/2019    Procedure: INJECTION, FOR SENTINEL NODE IDENTIFICATION;  Surgeon: Samia Fulton MD;  Location: [facility] OR 2ND FLR;  Service: General;  Laterality: Left;    INJECTION OF BOTULINUM TOXIN TYPE A N/A 10/8/2018    Procedure: INJECTION, BOTULINUM TOXIN, TYPE A 300 UNITS;  Surgeon: Ronel Whittington MD;  Location: [facility] OR 1ST FLR;  Service: Urology;  Laterality: N/A;    INJECTION OF BOTULINUM TOXIN TYPE A N/A 3/25/2019    Procedure: INJECTION, BOTULINUM  TOXIN, TYPE A 300 UNITS;  Surgeon: Ronel Whittington MD;  Location: [facility] OR [address] [place] [address];  Service: Urology;  Laterality: N/A;    INJECTION OF BOTULINUM TOXIN TYPE A N/A 8/26/2019    Procedure: INJECTION, BOTULINUM TOXIN, TYPE A 300 UNITS;  Surgeon: Ronel Whittington MD;  Location: [facility] OR [address] [place] [address];  Service: Urology;  Laterality: N/A;    MASTECTOMY Left 8/1/2019    Procedure: MASTECTOMY 23 hour stay;  Surgeon: Samia Fulton MD;  Location: [facility] OR [address] [place] [address];  Service: General;  Laterality: Left;    OVARIAN CYST SURGERY  1985    REPLACEMENT OF STENT Left 12/23/2021    Procedure: REPLACEMENT, STENT;  Surgeon: Ronel Whittington MD;  Location: [facility] OR [address] [place] [address];  Service: Urology;  Laterality: Left;    SENTINEL LYMPH NODE BIOPSY Left 8/1/2019    Procedure: BIOPSY, LYMPH NODE, SENTINEL;  Surgeon: Samia Fulton MD;  Location: [facility] OR [address] [place] [address];  Service: General;  Laterality: Left;    spt placement      TONSILLECTOMY, ADENOIDECTOMY      TOTAL ABDOMINAL HYSTERECTOMY W/ BILATERAL SALPINGOOPHORECTOMY  1985       Time Tracking:     PT Received On: 01/23/22  PT Start Time: 1120     PT Stop Time: 1201  PT Total Time (min): 41 min     Billable Minutes: Evaluation 15 min Therapeutic Activity 26 min      Gavin Larose, PT  01/23/2022

## 2022-01-23 NOTE — ASSESSMENT & PLAN NOTE
Body mass index is 47.12 kg/m². Morbid obesity complicates all aspects of disease management from diagnostic modalities to treatment. Weight loss encouraged and health benefits explained to patient.     RD consulted  1500 calorie diabetic diet  BOOST

## 2022-01-23 NOTE — CONSULTS
"Food & Nutrition  Education    Diet Education: Renal  Time Spent: 10 minutes  Learners: Pt      Nutrition Education provided with handouts: Chronic Kidney Disease Stage 5 Nutrition Therapy, Meal Planning using My Plate      Comments: Pt resting in bed with no family present. Pt reports following Renal diet at home. Discussed low K, Phos, and Na foods. Emphasized the importance of choosing </= 150 mg/serving for K items and limiting moderate (150-250 mg/serving) K food items. Also discussed eating "high" K and Phos food items in very limited settings. Pt verbalized understand. Recommended sodium goal </= 2000 mg/day (2 gm). Emphasized the importance on selecting foods with </= 140 mg Na per serving when label reading. Pt verbalized understanding. No other needs identified. Left education material with pt.      All questions and concerns answered. Dietitian's contact information provided.       Follow-Up: Yes    Please Re-consult as needed        Thanks!    "

## 2022-01-23 NOTE — PROGRESS NOTES
[facility] - Med Surg  Infectious Disease  Progress Note    Patient Name: Cecile Bowen  MRN: [id number]  Admission Date: 1/21/2022  Length of Stay: 0 days  Attending Physician: Bashir Moses MD  Primary Care Provider: Kailey Navarro MD    Isolation Status: Contact  Assessment/Plan:      Suprapubic catheter  70 yo female with Hx MDRO UTI, L renal stones and stent with SP catheter secondary to neurogenic bladder and obstruction admitted for HD with cf UTI as urine appeared purulent.  - UA x 2 positive but patient without SP pain or systemic sign of infection  - prior UCX last admit in Dec with ESBL Kleb pneumo - ertapenem and cipro sensitive  - UCX now showing ESBL Kleb Pneumo (intermediate to ertapenem) - meropenem sensitive  - concern for infection given amount of RBCs on UA  - Stable non septic    Plan:  1. DC ertapenem  2. Start renal adjusted meropenem  3. Contact isolation given prior ESBL  4. Please exchange murillo as likely colonized, be a retained nidus for infection and re seed urine once abx stopped.   5. Will follow        Anticipated Disposition: tbd    Thank you for your consult. I will follow-up with patient. Please contact us if you have any additional questions.    JEFFERY Mckeon  Infectious Disease  [facility] - Med Surg    Subjective:     Principal Problem:Dialysis patient    HPI: Cecile Bowen maikel 71F with ESRD (on HD), DM2, HTN, breast cancer s/p mastectomy, and recurrent UTI presented to the ED with chest heaviness on 1/21. Pt recently seen in ED 1/20 for similar chest heaviness which was felt due to volume overload and was DC'd home. She has a chronic suprapubic catheter of 5 years and is bed bound at baseline, she has a wheelchair for mobility but rarely uses it. She was started on HD 2 months ago and has issues making HD appointments due to transportation. She has missed her last appointments. Last session was Monday prior to discharge from SNF. She had been at SNF since  12/30/21 when she was admitted after a long hospital stay after being found down at home 11/28/21. During her hospital stay, she was treated for possible STEMI, worsening CKD, metabolic acidosis and required intubation for acute respiratory failure. She has a history of urinary retention, L sided renal stones and neurogenic bladder with L sided renal stents in place. She underwent exchange on 12/23 and had urine culture showing ESBL Kleb P that was treated with Unasyn though sensitivities show resistant.   She was discharged to [facility] and returned home on 1/17/22 after her HD session.    Since admit she has undergone SP cath change in 1/21 and has been started on cipro.  UA x 2 show WBC >100 and leukocyte 3+ and RBCs 55-80. UCx with gnr.  She denies any systemic sign of infection and reports always having cloudy urine with sediment.  Reports her sx of uti is RBCs in urine.  She is on cipro.  She had HD today and wants to go home.  ID consulted for further evaluation and treatment.       Interval History:   No AEON.  Afebrile and WBC WNL  UCx with ESBL Klep Pneumo - intermediate to ertapenem - Merrem and Bactrim sensitive only  The patient denies any recent fever, chills, or sweats.      Review of Systems   Constitutional: Negative for activity change, chills, diaphoresis and fever.   Respiratory: Negative for cough, shortness of breath and wheezing.    Cardiovascular: Negative for chest pain.   Gastrointestinal: Negative for abdominal pain, constipation, diarrhea, nausea and vomiting.   Genitourinary: Negative for dysuria, frequency and urgency.   Neurological: Negative for dizziness.   Hematological: Does not bruise/bleed easily.     Objective:     Vital Signs (Most Recent):  Temp: 97.9 °F (36.6 °C) (01/23/22 0727)  Pulse: 106 (01/23/22 0745)  Resp: 17 (01/23/22 0937)  BP: (!) 156/67 (01/23/22 0727)  SpO2: 96 % (01/23/22 0745) Vital Signs (24h Range):  Temp:  [97.9 °F (36.6 °C)-98.6 °F (37 °C)] 97.9 °F  (36.6 °C)  Pulse:  [] 106  Resp:  [12-20] 17  SpO2:  [94 %-100 %] 96 %  BP: (144-184)/(36-86) 156/67     Weight: 120.7 kg (265 lb 15.8 oz)  Body mass index is 47.12 kg/m².    Estimated Creatinine Clearance: 51.4 mL/min (based on SCr of 1.3 mg/dL).    Physical Exam  Constitutional:       General: She is not in acute distress.     Appearance: She is well-developed. She is obese. She is not ill-appearing, toxic-appearing or diaphoretic.   HENT:      Head: Normocephalic and atraumatic.   Cardiovascular:      Rate and Rhythm: Normal rate and regular rhythm.      Heart sounds: Normal heart sounds. No murmur heard.  No friction rub. No gallop.    Pulmonary:      Effort: Pulmonary effort is normal. No respiratory distress.      Breath sounds: Normal breath sounds. No wheezing or rales.   Abdominal:      General: Bowel sounds are normal. There is no distension.      Palpations: Abdomen is soft. There is no mass.      Tenderness: There is no abdominal tenderness. There is no guarding or rebound.   Skin:     General: Skin is warm and dry.   Neurological:      Mental Status: She is alert and oriented to person, place, and time.   Psychiatric:         Behavior: Behavior normal.     Pics 1/22              Significant Labs:   Blood Culture:   Recent Labs   Lab 09/29/21 2206 11/28/21 2121 11/28/21  2122 12/06/21  0758 12/06/21  0802   LABBLOO No growth after 5 days. Gram stain aer bottle: Gram positive cocci in clusters resembling Staph   Gram stain zelda bottle: Gram negative rods   Results called to and read back by: Love Montemayor, Charge Nurse   11/29/2021  20:32  COAGULASE-NEGATIVE STAPHYLOCOCCUS SPECIES  Organism is a probable contaminant  *  CLOSTRIDIUM SPOROGENES* No growth after 5 days. No growth after 5 days. No growth after 5 days.     CBC:   Recent Labs   Lab 01/21/22  1235 01/22/22  0403 01/23/22  0332   WBC 6.49 6.58 5.35   HGB 8.9* 7.7* 7.8*   HCT 27.7* 24.5* 24.8*    263 212     CMP:   Recent Labs    Lab 01/21/22  1235 01/21/22  1235 01/21/22 2019 01/22/22  0322 01/23/22  0332   *   < > 129* 131* 134*   K 3.4*   < > 3.6 3.8 3.4*   CL 90*   < > 95 97 99   CO2 27   < > 25 21* 25   *   < > 197* 127* 147*   BUN 31*   < > 31* 31* 16   CREATININE 2.2*   < > 2.0* 2.0* 1.3   CALCIUM 8.7   < > 8.2* 8.3* 8.0*   PROT 6.4  --   --   --   --    ALBUMIN 1.8*  --   --  1.5*  --    BILITOT 0.3  --   --   --   --    ALKPHOS 163*  --   --   --   --    AST 12  --   --   --   --    ALT 16  --   --   --   --    ANIONGAP 9   < > 9 13 10   EGFRNONAA 22.2*   < > 24.9* 24.9* 42.0*    < > = values in this interval not displayed.     Urine Culture:   Recent Labs   Lab 09/28/21 1730 11/28/21 1940 12/20/21 1928 01/20/22  2216 01/21/22  1402   LABURIN ENTEROCOCCUS FAECALIS  > 100,000 cfu/ml  * Multiple organisms isolated. None in predominance.  Repeat if  clinically necessary. KLEBSIELLA PNEUMONIAE ESBL  >100,000 cfu/ml  No other significant isolate  * KLEBSIELLA PNEUMONIAE ESBL  >100,000 cfu/ml  * GRAM NEGATIVE FERNANDEZ  > 100,000 cfu/ml  Identification and susceptibility pending  *     Urine Studies:   Recent Labs   Lab 09/28/21 1730 11/28/21 1940 01/21/22  1402   COLORU Yellow   < > Yellow   APPEARANCEUA Cloudy*   < > Cloudy*   PHUR 6.0   < > 6.0   SPECGRAV 1.020   < > 1.010   PROTEINUA 3+*   < > 2+*   GLUCUA 2+*   < > 1+*   KETONESU Trace*   < > Negative   BILIRUBINUA Negative   < > Negative   OCCULTUA 2+*   < > 2+*   NITRITE Positive*   < > Negative   LEUKOCYTESUR 3+*   < > 3+*   RBCUA 17*   < > 80*   WBCUA >100*   < > >100*   BACTERIA Occasional   < > Many*   SQUAMEPITHEL 0  --   --    HYALINECASTS 0   < > 1    < > = values in this interval not displayed.     All pertinent labs within the past 24 hours have been reviewed.    Significant Imaging: I have reviewed all pertinent imaging results/findings within the past 24 hours.   X-Ray Chest AP Portable [[id number]] Resulted: 01/21/22 1334   Order Status: Completed  Updated: 01/21/22 1336   Narrative:     EXAMINATION:   XR CHEST AP PORTABLE     CLINICAL HISTORY:   chest discomfort;     TECHNIQUE:   Single frontal view of the chest was performed.     COMPARISON:   01/20/2022     FINDINGS:   Again noted is right diaphragmatic elevation with a meniscus sign, likely indicating at least a small volume right pleural effusion.  The left lung field and pleural space remains clear.  The heart size appears normal.  There is mild calcification of the aortic knob consistent with atherosclerotic stigmata.  Tunneled right hemodialysis catheter noted.    Impression:       No interval detrimental change identified.       Electronically signed by: Rosaline Farah   Date: 01/21/2022   Time: 13:34       Imaging History    2022  Date Procedure Name Status Accession Number Location   01/21/22 01:21 PM X-Ray Chest AP Portable Final [id number] [facility]   01/20/22 09:44 PM X-Ray Chest AP Portable Final [id number] [facility]   2021  Date Procedure Name Status Accession Number Location   12/23/21 10:33 AM SURG [place] Surgery Fluoro Usage Final [id number] [facility]

## 2022-01-23 NOTE — ASSESSMENT & PLAN NOTE
Dialysis dependent ZAK  Nephrology consulted, appreciate recs  Suprapubic murillo declogged in ED, see UTI  Low albumin and history of nephrotic syndrome    Plan:  - Trend Cr  - Strict I&Os  - Avoid nephrotoxic agents  - Renally adjust medications  - Prealbumin low  - f/u P/C ratio

## 2022-01-23 NOTE — PLAN OF CARE
Problem: Physical Therapy Goal  Goal: Physical Therapy Goal  Description: Goals to be met by: 22    Patient will increase functional independence with mobility by performin. Supine to sit with MInimal Assistance  2. Sit to supine with MInimal Assistance  3. Sit to stand transfer with Maximum Assistance  4.Patient will demonstrate independence with a home exercise program  5. Bed to chair transfer with Contact Guard Assistance using Slideboard  Outcome: Ongoing, Progressing     PT evaluation completed, goals established and plan of care reviewed with patient.  Patient demonstrates decreased independence secondary to generalized weakness and decreased endurance.  Patient required mod A for bed mobility but requesting to hold sitting edge of bed secondary to increased fatigue following earlier OT and poor sleep overnight.  Patient participated increased rolling to don air pad mattress.  Patient will benefit from skilled PT in an acute care and skilled nursing setting to decreased burden of care in home environment.      Gavin Laroes, PT, DPT  2022

## 2022-01-23 NOTE — PLAN OF CARE
Problem: Skin Injury Risk Increased  Goal: Skin Health and Integrity  Outcome: Ongoing, Progressing     Problem: Adult Inpatient Plan of Care  Goal: Plan of Care Review  Outcome: Ongoing, Progressing  Goal: Patient-Specific Goal (Individualized)  Outcome: Ongoing, Progressing  Goal: Absence of Hospital-Acquired Illness or Injury  Outcome: Ongoing, Progressing  Goal: Optimal Comfort and Wellbeing  Outcome: Ongoing, Progressing  Goal: Readiness for Transition of Care  Outcome: Ongoing, Progressing     Problem: Bariatric Environmental Safety  Goal: Safety Maintained with Care  Outcome: Ongoing, Progressing     Problem: Device-Related Complication Risk (Hemodialysis)  Goal: Safe, Effective Therapy Delivery  Outcome: Ongoing, Progressing     Problem: Hemodynamic Instability (Hemodialysis)  Goal: Effective Tissue Perfusion  Outcome: Ongoing, Progressing     Problem: Infection (Hemodialysis)  Goal: Absence of Infection Signs and Symptoms  Outcome: Ongoing, Progressing     Problem: Diabetes Comorbidity  Goal: Blood Glucose Level Within Targeted Range  Outcome: Ongoing, Progressing     Problem: Fluid and Electrolyte Imbalance (Acute Kidney Injury/Impairment)  Goal: Fluid and Electrolyte Balance  Outcome: Ongoing, Progressing     Problem: Oral Intake Inadequate (Acute Kidney Injury/Impairment)  Goal: Optimal Nutrition Intake  Outcome: Ongoing, Progressing     Problem: Renal Function Impairment (Acute Kidney Injury/Impairment)  Goal: Effective Renal Function  Outcome: Ongoing, Progressing     Problem: Infection  Goal: Absence of Infection Signs and Symptoms  Outcome: Ongoing, Progressing     Problem: Impaired Wound Healing  Goal: Optimal Wound Healing  Outcome: Ongoing, Progressing     Problem: Skin Injury Risk Increased  Goal: Skin Health and Integrity  Outcome: Ongoing, Progressing

## 2022-01-24 PROBLEM — I15.9 SECONDARY HYPERTENSION: Status: ACTIVE | Noted: 2022-01-24

## 2022-01-24 LAB
ANION GAP SERPL CALC-SCNC: 11 MMOL/L (ref 8–16)
BASOPHILS # BLD AUTO: 0.03 K/UL (ref 0–0.2)
BASOPHILS NFR BLD: 0.6 % (ref 0–1.9)
BUN SERPL-MCNC: 20 MG/DL (ref 8–23)
CALCIUM SERPL-MCNC: 8.1 MG/DL (ref 8.7–10.5)
CHLORIDE SERPL-SCNC: 96 MMOL/L (ref 95–110)
CO2 SERPL-SCNC: 25 MMOL/L (ref 23–29)
CREAT SERPL-MCNC: 1.8 MG/DL (ref 0.5–1.4)
DIFFERENTIAL METHOD: ABNORMAL
EOSINOPHIL # BLD AUTO: 0.3 K/UL (ref 0–0.5)
EOSINOPHIL NFR BLD: 5.9 % (ref 0–8)
ERYTHROCYTE [DISTWIDTH] IN BLOOD BY AUTOMATED COUNT: 14.8 % (ref 11.5–14.5)
EST. GFR  (AFRICAN AMERICAN): 32.6 ML/MIN/1.73 M^2
EST. GFR  (NON AFRICAN AMERICAN): 28.3 ML/MIN/1.73 M^2
GLUCOSE SERPL-MCNC: 158 MG/DL (ref 70–110)
HAV IGM SERPL QL IA: NEGATIVE
HBV CORE IGM SERPL QL IA: NEGATIVE
HBV SURFACE AG SERPL QL IA: NEGATIVE
HCT VFR BLD AUTO: 26.2 % (ref 37–48.5)
HCV AB SERPL QL IA: NEGATIVE
HGB BLD-MCNC: 8 G/DL (ref 12–16)
IMM GRANULOCYTES # BLD AUTO: 0.13 K/UL (ref 0–0.04)
IMM GRANULOCYTES NFR BLD AUTO: 2.4 % (ref 0–0.5)
LYMPHOCYTES # BLD AUTO: 1.7 K/UL (ref 1–4.8)
LYMPHOCYTES NFR BLD: 31.7 % (ref 18–48)
MAGNESIUM SERPL-MCNC: 1.8 MG/DL (ref 1.6–2.6)
MCH RBC QN AUTO: 28.6 PG (ref 27–31)
MCHC RBC AUTO-ENTMCNC: 30.5 G/DL (ref 32–36)
MCV RBC AUTO: 94 FL (ref 82–98)
MONOCYTES # BLD AUTO: 1 K/UL (ref 0.3–1)
MONOCYTES NFR BLD: 18.7 % (ref 4–15)
NEUTROPHILS # BLD AUTO: 2.2 K/UL (ref 1.8–7.7)
NEUTROPHILS NFR BLD: 40.7 % (ref 38–73)
NRBC BLD-RTO: 0 /100 WBC
PHOSPHATE SERPL-MCNC: 4 MG/DL (ref 2.7–4.5)
PLATELET # BLD AUTO: 237 K/UL (ref 150–450)
PMV BLD AUTO: 9.2 FL (ref 9.2–12.9)
POCT GLUCOSE: 163 MG/DL (ref 70–110)
POCT GLUCOSE: 181 MG/DL (ref 70–110)
POCT GLUCOSE: 220 MG/DL (ref 70–110)
POCT GLUCOSE: 233 MG/DL (ref 70–110)
POTASSIUM SERPL-SCNC: 3.3 MMOL/L (ref 3.5–5.1)
RBC # BLD AUTO: 2.8 M/UL (ref 4–5.4)
SODIUM SERPL-SCNC: 132 MMOL/L (ref 136–145)
WBC # BLD AUTO: 5.45 K/UL (ref 3.9–12.7)

## 2022-01-24 PROCEDURE — 99900035 HC TECH TIME PER 15 MIN (STAT): Mod: HCNC

## 2022-01-24 PROCEDURE — 84100 ASSAY OF PHOSPHORUS: CPT | Mod: HCNC

## 2022-01-24 PROCEDURE — 36415 COLL VENOUS BLD VENIPUNCTURE: CPT | Mod: HCNC

## 2022-01-24 PROCEDURE — 25000003 PHARM REV CODE 250: Mod: HCNC

## 2022-01-24 PROCEDURE — 25000003 PHARM REV CODE 250: Mod: HCNC | Performed by: STUDENT IN AN ORGANIZED HEALTH CARE EDUCATION/TRAINING PROGRAM

## 2022-01-24 PROCEDURE — G0378 HOSPITAL OBSERVATION PER HR: HCPCS | Mod: HCNC

## 2022-01-24 PROCEDURE — 94761 N-INVAS EAR/PLS OXIMETRY MLT: CPT | Mod: HCNC

## 2022-01-24 PROCEDURE — 83735 ASSAY OF MAGNESIUM: CPT | Mod: HCNC

## 2022-01-24 PROCEDURE — 80048 BASIC METABOLIC PNL TOTAL CA: CPT | Mod: HCNC

## 2022-01-24 PROCEDURE — 85025 COMPLETE CBC W/AUTO DIFF WBC: CPT | Mod: HCNC

## 2022-01-24 PROCEDURE — 63600175 PHARM REV CODE 636 W HCPCS: Mod: HCNC | Performed by: PHYSICIAN ASSISTANT

## 2022-01-24 RX ORDER — SODIUM CHLORIDE 9 MG/ML
INJECTION, SOLUTION INTRAVENOUS ONCE
Status: DISCONTINUED | OUTPATIENT
Start: 2022-01-24 | End: 2022-01-25

## 2022-01-24 RX ORDER — MEROPENEM AND SODIUM CHLORIDE 1 G/50ML
1 INJECTION, SOLUTION INTRAVENOUS DAILY
Qty: 50 ML | Refills: 0 | Status: SHIPPED | OUTPATIENT
Start: 2022-01-24 | End: 2022-01-25 | Stop reason: HOSPADM

## 2022-01-24 RX ORDER — MEROPENEM AND SODIUM CHLORIDE 1 G/50ML
1 INJECTION, SOLUTION INTRAVENOUS
Status: DISCONTINUED | OUTPATIENT
Start: 2022-01-24 | End: 2022-01-31

## 2022-01-24 RX ORDER — HEPARIN SODIUM 1000 [USP'U]/ML
1000 INJECTION, SOLUTION INTRAVENOUS; SUBCUTANEOUS
Status: DISCONTINUED | OUTPATIENT
Start: 2022-01-25 | End: 2022-02-03

## 2022-01-24 RX ORDER — SODIUM CHLORIDE 9 MG/ML
INJECTION, SOLUTION INTRAVENOUS ONCE
Status: DISCONTINUED | OUTPATIENT
Start: 2022-01-25 | End: 2022-02-03

## 2022-01-24 RX ORDER — LISINOPRIL 10 MG/1
10 TABLET ORAL DAILY
Qty: 90 TABLET | Refills: 3 | Status: ON HOLD | OUTPATIENT
Start: 2022-01-25 | End: 2022-02-22 | Stop reason: SDUPTHER

## 2022-01-24 RX ORDER — LISINOPRIL 10 MG/1
10 TABLET ORAL DAILY
Status: DISCONTINUED | OUTPATIENT
Start: 2022-01-24 | End: 2022-02-05 | Stop reason: HOSPADM

## 2022-01-24 RX ORDER — POTASSIUM CHLORIDE 20 MEQ/1
40 TABLET, EXTENDED RELEASE ORAL ONCE
Status: COMPLETED | OUTPATIENT
Start: 2022-01-24 | End: 2022-01-24

## 2022-01-24 RX ORDER — HEPARIN SODIUM 1000 [USP'U]/ML
1000 INJECTION, SOLUTION INTRAVENOUS; SUBCUTANEOUS
Status: DISCONTINUED | OUTPATIENT
Start: 2022-01-24 | End: 2022-01-25

## 2022-01-24 RX ADMIN — SENNOSIDES AND DOCUSATE SODIUM 1 TABLET: 50; 8.6 TABLET ORAL at 09:01

## 2022-01-24 RX ADMIN — LEVOTHYROXINE SODIUM 50 MCG: 50 TABLET ORAL at 05:01

## 2022-01-24 RX ADMIN — LISINOPRIL 10 MG: 10 TABLET ORAL at 12:01

## 2022-01-24 RX ADMIN — OXYCODONE HYDROCHLORIDE 10 MG: 10 TABLET ORAL at 07:01

## 2022-01-24 RX ADMIN — METOPROLOL TARTRATE 25 MG: 25 TABLET, FILM COATED ORAL at 09:01

## 2022-01-24 RX ADMIN — METHOCARBAMOL 750 MG: 750 TABLET ORAL at 09:01

## 2022-01-24 RX ADMIN — FUROSEMIDE 160 MG: 80 TABLET ORAL at 09:01

## 2022-01-24 RX ADMIN — CLONIDINE HYDROCHLORIDE 0.2 MG: 0.2 TABLET ORAL at 03:01

## 2022-01-24 RX ADMIN — INSULIN ASPART 2 UNITS: 100 INJECTION, SOLUTION INTRAVENOUS; SUBCUTANEOUS at 12:01

## 2022-01-24 RX ADMIN — FAMOTIDINE 20 MG: 20 TABLET ORAL at 09:01

## 2022-01-24 RX ADMIN — ANASTROZOLE 1 MG: 1 TABLET, COATED ORAL at 09:01

## 2022-01-24 RX ADMIN — APIXABAN 2.5 MG: 2.5 TABLET, FILM COATED ORAL at 09:01

## 2022-01-24 RX ADMIN — OXYCODONE HYDROCHLORIDE 10 MG: 10 TABLET ORAL at 03:01

## 2022-01-24 RX ADMIN — GEMFIBROZIL 600 MG: 600 TABLET ORAL at 06:01

## 2022-01-24 RX ADMIN — ATORVASTATIN CALCIUM 80 MG: 20 TABLET, FILM COATED ORAL at 09:01

## 2022-01-24 RX ADMIN — OXYBUTYNIN CHLORIDE 10 MG: 10 TABLET, FILM COATED, EXTENDED RELEASE ORAL at 09:01

## 2022-01-24 RX ADMIN — POTASSIUM CHLORIDE 40 MEQ: 1500 TABLET, EXTENDED RELEASE ORAL at 09:01

## 2022-01-24 RX ADMIN — MEROPENEM AND SODIUM CHLORIDE 1 G: 1 INJECTION, SOLUTION INTRAVENOUS at 10:01

## 2022-01-24 RX ADMIN — METHOCARBAMOL 750 MG: 750 TABLET ORAL at 06:01

## 2022-01-24 RX ADMIN — Medication 6 MG: at 07:01

## 2022-01-24 RX ADMIN — OXYCODONE HYDROCHLORIDE 10 MG: 10 TABLET ORAL at 09:01

## 2022-01-24 RX ADMIN — CLONIDINE HYDROCHLORIDE 0.2 MG: 0.2 TABLET ORAL at 09:01

## 2022-01-24 RX ADMIN — ACETAMINOPHEN 650 MG: 325 TABLET ORAL at 01:01

## 2022-01-24 NOTE — PROGRESS NOTES
Petros Shaffer - [facility] Surg  Wound Care    Patient Name:  Cecile Bowen   MRN:  [id number]  Date: 1/24/2022  Diagnosis: Dialysis patient    History:     Past Medical History:   Diagnosis Date    Cervicogenic migraine     Chronic pain     CKD (chronic kidney disease) stage 4, GFR 15-29 ml/min     [facility]    CKD (chronic kidney disease) stage 4, GFR 15-29 ml/min     Diabetes mellitus     Long term use of Insulin, Diabetic Neuropathy    Fibromyalgia     Hydronephrosis     Hyperlipidemia     Hypertension 12/12/2012    Hypothyroidism 12/12/2012    ARON (iron deficiency anemia)     Insomnia     Levoscoliosis     Malignant neoplasm of upper-outer quadrant of left breast in female, estrogen receptor positive     Metabolic acidosis     Mobility impaired     Nuclear sclerosis - Both Eyes 3/24/2014    VIJAYA (obstructive sleep apnea)     Osteopenia     Pulmonary nodule     Recurrent UTI     Renal manifestation of secondary diabetes mellitus     Secondary hyperparathyroidism, renal     Urinary retention        Social History     Socioeconomic History    Marital status:     Number of children: 0    Highest education level: Master's degree (e.g., MA, MS, Lelo, MEd, MSW, BANDAR)   Occupational History    Occupation: teacher     Comment: retired   Tobacco Use    Smoking status: Never Smoker    Smokeless tobacco: Never Used   Substance and Sexual Activity    Alcohol use: No     Alcohol/week: 0.0 standard drinks    Drug use: No    Sexual activity: Not Currently     Birth control/protection: Abstinence   Social History Narrative    Single ()        Lives w/ sister and brother. Pt needs to keep her narcotics hidden from her brother who will steal them         Brother passed away last year.  Pt lives her her sister. (5/27)     Social Determinants of Health     Financial Resource Strain: Medium Risk    Difficulty of Paying Living Expenses: Somewhat hard   Food Insecurity: Food Insecurity Present     Worried About Running Out of Food in the Last Year: Never true    Ran Out of Food in the Last Year: Sometimes true   Transportation Needs: No Transportation Needs    Lack of Transportation (Medical): No    Lack of Transportation (Non-Medical): No   Physical Activity: Inactive    Days of Exercise per Week: 0 days    Minutes of Exercise per Session: 0 min   Stress: No Stress Concern Present    Feeling of Stress : Only a little   Social Connections: Moderately Isolated    Frequency of Communication with Friends and Family: Three times a week    Frequency of Social Gatherings with Friends and Family: Never    Attends Moravian Services: Never    Active Member of Clubs or Organizations: Yes    Attends Club or Organization Meetings: Never    Marital Status:    Housing Stability: Low Risk     Unable to Pay for Housing in the Last Year: No    Number of Places Lived in the Last Year: 1    Unstable Housing in the Last Year: No       Precautions:     Allergies as of 01/21/2022    (No Known Allergies)       [facility] Assessment Details/Treatment   Wound care consulted for sacrum, LLE by RN   The right buttock is red/pink/moist with epithelialization to the wound edges, irregular wound edges from the gluteal cleft to the right buttock. Excoriation to the lower buttock observed.   The right lower leg(calf) has 2 wounds with darkened skin surrounding the wounds.  The wound tissue is pink/moist, irregular.   The left inner thigh has partial thickness skin loss in a linear shape    Plan:  Right buttock/right calf- cleanse with wound cleanser or sterile normal saline, apply Cavilon barrier spray to the klaus-wound, apply triad ointment over the wound bed and cover with a foam dressing daily.    Waffle overlay in place. Supplies at bedside.     Nursing to continue care, pressure prevention measures  Wound care will follow-up prn  Recommendations made to primary team per secure chat for above plan . Orders placed.       01/24/22 1045        Altered Skin Integrity 12/11/21 1718 Right lower Calf   Date First Assessed/Time First Assessed: 12/11/21 1718   Altered Skin Integrity Present on Admission: suspected hospital acquired  Side: Right  Orientation: lower  Location: Calf   Wound Image     Dressing Appearance Open to air   Drainage Amount None   Appearance Pink;Red;Moist   Tissue loss description Partial thickness   Periwound Area Intact;Dry;Pink   Wound Edges Open;Irregular   Care Cleansed with:;Sterile normal saline;Applied:;Skin Barrier  (Triad ointment)   Dressing Applied;Foam   Dressing Change Due 01/27/22        Altered Skin Integrity 11/30/21 1240 Right Buttocks Full thickness tissue loss. Subcutaneous fat may be visible but bone, tendon or muscle are not exposed   Date First Assessed/Time First Assessed: 11/30/21 1240   Altered Skin Integrity Present on Admission: yes  Side: Right  Location: Buttocks  Description of Altered Skin Integrity: Full thickness tissue loss. Subcutaneous fat may be visible but bone, te...   Wound Image    Description of Altered Skin Integrity Full thickness tissue loss. Subcutaneous fat may be visible but bone, tendon or muscle are not exposed   Dressing Appearance Intact;Moist drainage   Drainage Amount Small   Drainage Characteristics/Odor Serous   Appearance Pink;Red;Moist   Tissue loss description Partial thickness   Periwound Area Moist;Excoriated   Wound Edges Open   Wound Length (cm) 5 cm   Wound Width (cm) 205 cm   Wound Depth (cm) 0.2 cm   Wound Volume (cm^3) 205 cm^3   Wound Surface Area (cm^2) 1025 cm^2   Care Cleansed with:;Sterile normal saline;Applied:;Skin Barrier  (triad ointment)   Dressing Changed;Foam     Right lower buttocks,right inner thigh-      01/24/2022

## 2022-01-24 NOTE — PLAN OF CARE
[facility]    FACILITY TRANSFER ORDERS      Patient Name: Cecile Bowen  YOB: 1950    PCP: Kailey Navarro MD   PCP Address: [address] [place] [address] / [place] [address]  PCP Phone Number: [phone]  PCP Fax: [phone]    Encounter Date: 01/24/2022    Admit to: Skilled Bed    Vital Signs:  Routine    Diagnoses:   Active Hospital Problems    Diagnosis  POA    *Dialysis patient [Z99.2]  Not Applicable     Chronic    Debility [R53.81]  Yes     Chronic    A-fib [I48.91]  Yes     Chronic    ZAK (acute kidney injury) [N17.9]  Unknown    Normocytic anemia [D64.9]  Yes     Chronic    S/P left mastectomy [Z90.12]  Not Applicable     Chronic    Requires daily assistance for activities of daily living (ADL) and comfort needs [Z74.1]  Yes     Chronic    Malignant neoplasm of left breast, estrogen receptor positive [C50.912, Z17.0]  Not Applicable     Chronic    Cervicogenic migraine [G43.809]  Yes     Chronic    CKD stage 4 due to type 2 diabetes mellitus [E11.22, N18.4]  Yes     Chronic     [facility]      Chronic pain [G89.29]  Yes     Chronic    Long term prescription opiate use [Z79.891]  Not Applicable     Chronic    Morbid obesity due to excess calories [E66.01]  Yes     Chronic    Suprapubic catheter [Z93.59]  Not Applicable     Chronic    Uncontrolled type 2 diabetes mellitus with stage 4 chronic kidney disease, with long-term current use of insulin [E11.22, E11.65, N18.4, Z79.4]  Not Applicable     Chronic    Major depressive disorder, recurrent episode, moderate [F33.1]  Yes     Chronic    Neurogenic bladder [N31.9]  Yes     Chronic    Recurrent urinary tract infection [N39.0]  Yes     Chronic    Hyponatremia [E87.1]  Yes     Chronic    VIJAYA (obstructive sleep apnea) [G47.33]  Yes     Chronic    Hyperlipidemia associated with type 2 diabetes mellitus [E11.69, E78.5]  Yes     Chronic    Fibromyalgia [M79.7]  Yes     Chronic    Hypothyroidism  [E03.9]  Yes     Chronic      Resolved Hospital Problems   No resolved problems to display.       Allergies:Review of patient's allergies indicates:  No Known Allergies    Diet: diabetic diet: 1800 calorie    Activities: Bed rest with bathroom privileges and Up with assistance    Nursing: routine     Labs: BMP Once     CONSULTS:    Physical Therapy to evaluate and treat. , Occupational Therapy to evaluate and treat. and  to evaluate for community resources/long-range planning.    MISCELLANEOUS CARE:  Tiwari Care: Empty Tiwari bag every shift. Change Tiwari every month., Routine Skin for Bedridden Patients: Apply moisture barrier cream to all skin folds and wet areas in perineal area daily and after baths and all bowel movements. and Diabetes Care:   SN to perform and educate Diabetic management with blood glucose monitoring: and Fingerstick blood sugar AC and HS    WOUND CARE ORDERS  Yes: Pressure Ulcer(s) Stage II :    Right buttock/right calf- cleanse with wound cleanser or sterile normal saline, apply Cavilon barrier spray to the klaus-wound, apply triad ointment over the wound bed and cover with a foam dressing daily.    Waffle overlay in place. Supplies at bedside.          Medications: Review discharge medications with patient and family and provide education.      Current Discharge Medication List      START taking these medications    Details   lisinopriL 10 MG tablet Take 1 tablet (10 mg total) by mouth once daily.  Qty: 90 tablet, Refills: 3    Comments: .      meropenem-0.9% sodium chloride 1 gram/50 mL PgBk Inject 50 mLs (1 g total) into the vein once daily. for 2 days  Qty: 50 mL, Refills: 0         CONTINUE these medications which have NOT CHANGED    Details   albuterol (PROVENTIL HFA) 90 mcg/actuation inhaler Inhale 2 puffs into the lungs every 6 (six) hours as needed for Wheezing or Shortness of Breath. Rescue  Qty: 8.5 g, Refills: 0      anastrozole (ARIMIDEX) 1 mg Tab TAKE 1 TABLET BY  MOUTH EVERY DAY  Qty: 90 tablet, Refills: 2    Associated Diagnoses: Malignant neoplasm of upper-outer quadrant of left breast in female, estrogen receptor positive      apixaban (ELIQUIS) 2.5 mg Tab Take 1 tablet (2.5 mg total) by mouth 2 (two) times daily.  Qty: 60 tablet, Refills: 0      !! blood sugar diagnostic Strp ONE TOUCH ULTRA Check blood sugars 1-2 x a day  Qty: 50 strip, Refills: 6    Comments: ONE TOUCH ULTRA Check blood sugars 1-2 x a day  Associated Diagnoses: Uncontrolled type 2 diabetes mellitus with chronic kidney disease, with long-term current use of insulin      !! blood sugar diagnostic Strp To check BG 4  times daily, to use with insurance preferred meter  Qty: 400 each, Refills: 3      butalbital-acetaminophen-caffeine -40 mg (FIORICET, ESGIC) -40 mg per tablet TAKE 1 TABLET BY MOUTH WHEN NOT CONTROLLED BY OTHER MEDS. NO MORE THAN 10 TABS PER 30 DAYS  Qty: 10 tablet, Refills: 0      cloNIDine (CATAPRES) 0.2 MG tablet Take 1 tablet (0.2 mg total) by mouth 3 (three) times daily.  Qty: 90 tablet, Refills: 11      ergocalciferol (ERGOCALCIFEROL) 50,000 unit Cap TAKE 1 CAPSULE (50,000 UNITS TOTAL) BY MOUTH EVERY 7 DAYS. TAKE ONE CAPSULE BY MOUTH ONE TIME PER WEEK, TAKES ON TUESDAYS  Qty: 12 capsule, Refills: 3      famotidine (PEPCID) 20 MG tablet Take 1 tablet (20 mg total) by mouth once daily.  Qty: 30 tablet, Refills: 11      furosemide (LASIX) 80 MG tablet Take 2 tablets (160 mg total) by mouth 2 (two) times daily.  Qty: 120 tablet, Refills: 0      !! lancets Misc One touch ultra, checks 1-2 x a day  Qty: 50 each, Refills: 6      !! lancets Misc To check BG 4 times daily, to use with insurance preferred meter  Qty: 400 each, Refills: 3      melatonin (MELATIN) 3 mg tablet Take 2 tablets (6 mg total) by mouth nightly as needed for Insomnia.  Qty: 30 tablet, Refills: 0      methocarbamoL (ROBAXIN) 750 MG Tab TAKE 1-2 TABLETS (750-1,500 MG TOTAL) BY MOUTH 3 (THREE) TIMES DAILY AS  "NEEDED.  Qty: 180 tablet, Refills: 1    Associated Diagnoses: Chronic midline low back pain without sciatica; Chronic neck pain; Fibromyalgia      oxybutynin (DITROPAN-XL) 10 MG 24 hr tablet TAKE 1 TABLET BY MOUTH EVERY DAY  Qty: 90 tablet, Refills: 1    Associated Diagnoses: Bladder spasms      polyethylene glycol (GLYCOLAX) 17 gram PwPk One packet QD prn constipation  Qty: 30 packet, Refills: 6      sevelamer carbonate (RENVELA) 800 mg Tab Take 1 tablet (800 mg total) by mouth 3 (three) times daily with meals.  Qty: 90 tablet, Refills: 11      sumatriptan (IMITREX) 100 MG tablet TAKE 1 TABLET (100 MG TOTAL) BY MOUTH 2 (TWO) TIMES DAILY AS NEEDED FOR MIGRAINE.  Qty: 9 tablet, Refills: 11    Associated Diagnoses: Intractable chronic migraine without aura and with status migrainosus      SYNTHROID 50 mcg tablet TAKE 1 TABLET BY MOUTH EVERY DAY  Qty: 90 tablet, Refills: 2      traZODone (DESYREL) 100 MG tablet Take 1 tablet (100 mg total) by mouth nightly as needed for Insomnia.  Qty: 90 tablet, Refills: 0      atorvastatin (LIPITOR) 80 MG tablet Take 1 tablet (80 mg total) by mouth once daily.  Qty: 90 tablet, Refills: 3      BD INSULIN SYRINGE ULTRA-FINE 0.5 mL 31 gauge x 5/16" Syrg USE WITH INSULIN 4 TIMES A DAY  Qty: 100 each, Refills: 12      blood-glucose meter kit To check BG 4 times daily, to use with insurance preferred meter  Qty: 1 each, Refills: 0      gemfibroziL (LOPID) 600 MG tablet Take 1 tablet (600 mg total) by mouth every Mon, Wed, Fri.  Qty: 36 tablet, Refills: 3      magnesium oxide (MAG-OX) 400 mg (241.3 mg magnesium) tablet TAKE 1 TABLET (400 MG TOTAL) BY MOUTH 2 (TWO) TIMES DAILY.  Qty: 180 tablet, Refills: 3      metoprolol tartrate (LOPRESSOR) 25 MG tablet Take 1 tablet (25 mg total) by mouth 2 (two) times daily.  Qty: 60 tablet, Refills: 11    Comments: .      pen needle, diabetic (BD ULTRA-FINE SHORT PEN NEEDLE) 31 gauge x 5/16" Ndle USE WITH LANTUS DAILY, e 11.65  Qty: 100 each, Refills: 3 "    Comments: DX Code Needed  PATIENT IS REQUESTING A REFILL FOR THIS RX.  Associated Diagnoses: Type 2 diabetes mellitus not at goal       !! - Potential duplicate medications found. Please discuss with provider.             Immunizations Administered as of 1/24/2022     Name Date Dose VIS Date Route Exp Date    COVID-19, MRNA, LN-S, PF (Moderna) 3/25/2021  8:20 AM 0.5 mL 12/19/2020 Intramuscular 8/29/2021    Site: Right deltoid     Given By: Desiree Perdomo RN     : Modern[employer]     Lot: [id number]     COVID-19, MRNA, LN-S, PF (Moderna) 2/25/2021  8:05 AM 0.5 mL 12/19/2020 Intramuscular 8/17/2021    Site: Right deltoid     Given By: Liana Lopez RN     : Moderna [employer]     Lot: [id number]     COVID-19, MRNA, LN-S, PF (Pfizer) (Purple Cap) 12/24/2021  4:01 PM 0.3 mL 12/12/2020 Intramuscular 12/24/2021    Site: Right deltoid     Given By: Molly Carmen RN     : Pf[employer] Inc     Lot: [id number]           This patient has had both covid vaccinations    Some patients may experience side effects after vaccination.  These may include fever, headache, muscle or joint aches.  Most symptoms resolve with 24-48 hours and do not require urgent medical evaluation unless they persist for more than 72 hours or symptoms are concerning for an unrelated medical condition.          _________________________________  Vero Degroot MD  01/24/2022

## 2022-01-24 NOTE — PLAN OF CARE
AAOx4 w/ VSS during shift. CBG monitored and managed w/ insulin. Mg and K+ replaced per MD order. Infection treated w/ IV ABX. Pain managed w/ PO analgesics. Supapubic catheter replaced and urine protein creatine ration collected per MD order. Midline consulted but have not come to insert new line yet.

## 2022-01-24 NOTE — PLAN OF CARE
Problem: Skin Injury Risk Increased  Goal: Skin Health and Integrity  Outcome: Ongoing, Not Progressing     Problem: Skin Injury Risk Increased  Goal: Skin Health and Integrity  Outcome: Ongoing, Not Progressing

## 2022-01-24 NOTE — RESPIRATORY THERAPY
RAPID RESPONSE RESPIRATORY CHART CHECK       Chart check completed,  instructed to call [phone] for further concerns or assistance.

## 2022-01-25 ENCOUNTER — PATIENT MESSAGE (OUTPATIENT)
Dept: ADMINISTRATIVE | Facility: HOSPITAL | Age: 72
End: 2022-01-25
Payer: MEDICARE

## 2022-01-25 PROBLEM — E83.9 CHRONIC KIDNEY DISEASE-MINERAL AND BONE DISORDER: Status: ACTIVE | Noted: 2022-01-25

## 2022-01-25 PROBLEM — D63.8 ANEMIA OF CHRONIC DISEASE: Status: ACTIVE | Noted: 2022-01-25

## 2022-01-25 PROBLEM — M89.9 CHRONIC KIDNEY DISEASE-MINERAL AND BONE DISORDER: Status: ACTIVE | Noted: 2022-01-25

## 2022-01-25 PROBLEM — N18.9 CHRONIC KIDNEY DISEASE-MINERAL AND BONE DISORDER: Status: ACTIVE | Noted: 2022-01-25

## 2022-01-25 LAB
ANION GAP SERPL CALC-SCNC: 13 MMOL/L (ref 8–16)
BASOPHILS # BLD AUTO: 0.03 K/UL (ref 0–0.2)
BASOPHILS NFR BLD: 0.5 % (ref 0–1.9)
BUN SERPL-MCNC: 25 MG/DL (ref 8–23)
CALCIUM SERPL-MCNC: 8 MG/DL (ref 8.7–10.5)
CHLORIDE SERPL-SCNC: 95 MMOL/L (ref 95–110)
CO2 SERPL-SCNC: 23 MMOL/L (ref 23–29)
CREAT SERPL-MCNC: 1.8 MG/DL (ref 0.5–1.4)
DIFFERENTIAL METHOD: ABNORMAL
EOSINOPHIL # BLD AUTO: 0.3 K/UL (ref 0–0.5)
EOSINOPHIL NFR BLD: 4.9 % (ref 0–8)
ERYTHROCYTE [DISTWIDTH] IN BLOOD BY AUTOMATED COUNT: 14.8 % (ref 11.5–14.5)
EST. GFR  (AFRICAN AMERICAN): 32.6 ML/MIN/1.73 M^2
EST. GFR  (NON AFRICAN AMERICAN): 28.3 ML/MIN/1.73 M^2
GLUCOSE SERPL-MCNC: 161 MG/DL (ref 70–110)
HCT VFR BLD AUTO: 25.6 % (ref 37–48.5)
HGB BLD-MCNC: 8 G/DL (ref 12–16)
IMM GRANULOCYTES # BLD AUTO: 0.14 K/UL (ref 0–0.04)
IMM GRANULOCYTES NFR BLD AUTO: 2.2 % (ref 0–0.5)
LYMPHOCYTES # BLD AUTO: 1.8 K/UL (ref 1–4.8)
LYMPHOCYTES NFR BLD: 29.1 % (ref 18–48)
MAGNESIUM SERPL-MCNC: 1.7 MG/DL (ref 1.6–2.6)
MCH RBC QN AUTO: 28.9 PG (ref 27–31)
MCHC RBC AUTO-ENTMCNC: 31.3 G/DL (ref 32–36)
MCV RBC AUTO: 92 FL (ref 82–98)
MONOCYTES # BLD AUTO: 0.8 K/UL (ref 0.3–1)
MONOCYTES NFR BLD: 13.1 % (ref 4–15)
NEUTROPHILS # BLD AUTO: 3.2 K/UL (ref 1.8–7.7)
NEUTROPHILS NFR BLD: 50.2 % (ref 38–73)
NRBC BLD-RTO: 0 /100 WBC
PHOSPHATE SERPL-MCNC: 4.7 MG/DL (ref 2.7–4.5)
PLATELET # BLD AUTO: 241 K/UL (ref 150–450)
PMV BLD AUTO: 9.3 FL (ref 9.2–12.9)
POCT GLUCOSE: 150 MG/DL (ref 70–110)
POCT GLUCOSE: 174 MG/DL (ref 70–110)
POCT GLUCOSE: 191 MG/DL (ref 70–110)
POCT GLUCOSE: 213 MG/DL (ref 70–110)
POCT GLUCOSE: 224 MG/DL (ref 70–110)
POTASSIUM SERPL-SCNC: 3.8 MMOL/L (ref 3.5–5.1)
RBC # BLD AUTO: 2.77 M/UL (ref 4–5.4)
SODIUM SERPL-SCNC: 131 MMOL/L (ref 136–145)
WBC # BLD AUTO: 6.28 K/UL (ref 3.9–12.7)

## 2022-01-25 PROCEDURE — 25000003 PHARM REV CODE 250: Mod: HCNC

## 2022-01-25 PROCEDURE — 36415 COLL VENOUS BLD VENIPUNCTURE: CPT | Mod: HCNC

## 2022-01-25 PROCEDURE — 36415 COLL VENOUS BLD VENIPUNCTURE: CPT | Mod: HCNC | Performed by: HOSPITALIST

## 2022-01-25 PROCEDURE — 11000001 HC ACUTE MED/SURG PRIVATE ROOM: Mod: HCNC

## 2022-01-25 PROCEDURE — 25000003 PHARM REV CODE 250: Mod: HCNC | Performed by: STUDENT IN AN ORGANIZED HEALTH CARE EDUCATION/TRAINING PROGRAM

## 2022-01-25 PROCEDURE — 27000207 HC ISOLATION: Mod: HCNC

## 2022-01-25 PROCEDURE — 80100014 HC HEMODIALYSIS 1:1: Mod: HCNC

## 2022-01-25 PROCEDURE — 82610 CYSTATIN C: CPT | Mod: HCNC | Performed by: HOSPITALIST

## 2022-01-25 PROCEDURE — 86580 TB INTRADERMAL TEST: CPT | Mod: HCNC | Performed by: HOSPITALIST

## 2022-01-25 PROCEDURE — 84100 ASSAY OF PHOSPHORUS: CPT | Mod: HCNC

## 2022-01-25 PROCEDURE — 30200315 PPD INTRADERMAL TEST REV CODE 302: Mod: HCNC | Performed by: HOSPITALIST

## 2022-01-25 PROCEDURE — 80048 BASIC METABOLIC PNL TOTAL CA: CPT | Mod: HCNC

## 2022-01-25 PROCEDURE — 63600175 PHARM REV CODE 636 W HCPCS: Mod: JG,HCNC | Performed by: NURSE PRACTITIONER

## 2022-01-25 PROCEDURE — 63600175 PHARM REV CODE 636 W HCPCS: Mod: HCNC | Performed by: STUDENT IN AN ORGANIZED HEALTH CARE EDUCATION/TRAINING PROGRAM

## 2022-01-25 PROCEDURE — 63600175 PHARM REV CODE 636 W HCPCS: Mod: HCNC | Performed by: PHYSICIAN ASSISTANT

## 2022-01-25 PROCEDURE — 83735 ASSAY OF MAGNESIUM: CPT | Mod: HCNC

## 2022-01-25 PROCEDURE — 85025 COMPLETE CBC W/AUTO DIFF WBC: CPT | Mod: HCNC

## 2022-01-25 RX ORDER — SULFAMETHOXAZOLE AND TRIMETHOPRIM 800; 160 MG/1; MG/1
1 TABLET ORAL 2 TIMES DAILY
Qty: 6 TABLET | Refills: 0 | Status: SHIPPED | OUTPATIENT
Start: 2022-01-26 | End: 2022-02-04 | Stop reason: HOSPADM

## 2022-01-25 RX ORDER — VENLAFAXINE HYDROCHLORIDE 75 MG/1
150 CAPSULE, EXTENDED RELEASE ORAL DAILY
Status: DISCONTINUED | OUTPATIENT
Start: 2022-01-25 | End: 2022-02-05 | Stop reason: HOSPADM

## 2022-01-25 RX ORDER — POLYETHYLENE GLYCOL 3350 17 G/17G
17 POWDER, FOR SOLUTION ORAL 2 TIMES DAILY
Status: DISCONTINUED | OUTPATIENT
Start: 2022-01-25 | End: 2022-02-05 | Stop reason: HOSPADM

## 2022-01-25 RX ADMIN — OXYBUTYNIN CHLORIDE 10 MG: 10 TABLET, FILM COATED, EXTENDED RELEASE ORAL at 01:01

## 2022-01-25 RX ADMIN — SENNOSIDES AND DOCUSATE SODIUM 1 TABLET: 50; 8.6 TABLET ORAL at 01:01

## 2022-01-25 RX ADMIN — LISINOPRIL 10 MG: 10 TABLET ORAL at 01:01

## 2022-01-25 RX ADMIN — APIXABAN 2.5 MG: 2.5 TABLET, FILM COATED ORAL at 08:01

## 2022-01-25 RX ADMIN — HEPARIN SODIUM 1000 UNITS: 1000 INJECTION INTRAVENOUS; SUBCUTANEOUS at 11:01

## 2022-01-25 RX ADMIN — LEVOTHYROXINE SODIUM 50 MCG: 50 TABLET ORAL at 05:01

## 2022-01-25 RX ADMIN — EPOETIN ALFA-EPBX 6000 UNITS: 10000 INJECTION, SOLUTION INTRAVENOUS; SUBCUTANEOUS at 11:01

## 2022-01-25 RX ADMIN — CLONIDINE HYDROCHLORIDE 0.2 MG: 0.2 TABLET ORAL at 03:01

## 2022-01-25 RX ADMIN — POLYETHYLENE GLYCOL 3350 17 G: 17 POWDER, FOR SOLUTION ORAL at 01:01

## 2022-01-25 RX ADMIN — APIXABAN 2.5 MG: 2.5 TABLET, FILM COATED ORAL at 01:01

## 2022-01-25 RX ADMIN — METOPROLOL TARTRATE 25 MG: 25 TABLET, FILM COATED ORAL at 08:01

## 2022-01-25 RX ADMIN — Medication 6 MG: at 08:01

## 2022-01-25 RX ADMIN — FUROSEMIDE 160 MG: 80 TABLET ORAL at 01:01

## 2022-01-25 RX ADMIN — CLONIDINE HYDROCHLORIDE 0.2 MG: 0.2 TABLET ORAL at 09:01

## 2022-01-25 RX ADMIN — POLYETHYLENE GLYCOL 3350 17 G: 17 POWDER, FOR SOLUTION ORAL at 08:01

## 2022-01-25 RX ADMIN — SENNOSIDES AND DOCUSATE SODIUM 1 TABLET: 50; 8.6 TABLET ORAL at 08:01

## 2022-01-25 RX ADMIN — ATORVASTATIN CALCIUM 80 MG: 20 TABLET, FILM COATED ORAL at 01:01

## 2022-01-25 RX ADMIN — OXYCODONE HYDROCHLORIDE 10 MG: 10 TABLET ORAL at 09:01

## 2022-01-25 RX ADMIN — CLONIDINE HYDROCHLORIDE 0.2 MG: 0.2 TABLET ORAL at 06:01

## 2022-01-25 RX ADMIN — MUPIROCIN: 20 OINTMENT TOPICAL at 09:01

## 2022-01-25 RX ADMIN — ACETAMINOPHEN 650 MG: 325 TABLET ORAL at 08:01

## 2022-01-25 RX ADMIN — ANASTROZOLE 1 MG: 1 TABLET, COATED ORAL at 01:01

## 2022-01-25 RX ADMIN — BUTALBITAL, ACETAMINOPHEN, AND CAFFEINE 1 TABLET: 50; 325; 40 TABLET ORAL at 01:01

## 2022-01-25 RX ADMIN — METOPROLOL TARTRATE 25 MG: 25 TABLET, FILM COATED ORAL at 01:01

## 2022-01-25 RX ADMIN — FUROSEMIDE 160 MG: 80 TABLET ORAL at 08:01

## 2022-01-25 RX ADMIN — FAMOTIDINE 20 MG: 20 TABLET ORAL at 01:01

## 2022-01-25 RX ADMIN — TUBERCULIN PURIFIED PROTEIN DERIVATIVE 5 UNITS: 5 INJECTION, SOLUTION INTRADERMAL at 04:01

## 2022-01-25 RX ADMIN — MEROPENEM AND SODIUM CHLORIDE 1 G: 1 INJECTION, SOLUTION INTRAVENOUS at 11:01

## 2022-01-25 RX ADMIN — CLONIDINE HYDROCHLORIDE 0.2 MG: 0.2 TABLET ORAL at 01:01

## 2022-01-25 NOTE — PROGRESS NOTES
Dialysis completed. Right IJ tunneled catheter flushed with normal saline, heparinized, capped and taped.Patient dialyzed for 3 hours with fluid removal of 3 liters. Tolerated well with stable vital signs.

## 2022-01-25 NOTE — ASSESSMENT & PLAN NOTE
At baseline on admission  Anemia of chronic disease    CBC daily  Transfuse if Hbg <7  Checking iron studies, folate, B12, and epo with HD

## 2022-01-25 NOTE — PROGRESS NOTES
[facility] - Med Surg  Infectious Disease  Progress Note    Patient Name: Cecile Bowen  MRN: [id number]  Admission Date: 1/21/2022  Length of Stay: 0 days  Attending Physician: Bashir Moses MD  Primary Care Provider: Kialey Navarro MD    Isolation Status: Contact  Assessment/Plan:      Suprapubic catheter  72 yo female with Hx MDRO UTI, L renal stones and stent with SP catheter secondary to neurogenic bladder and obstruction admitted for HD with cf UTI as urine appeared purulent.  - prior UCX last admit in Dec with ESBL Kleb pneumo - ertapenem and cipro sensitive  - UCX x 2 now showing ESBL Kleb Pneumo (intermediate to ertapenem) - meropenem sensitive and repeat UCX post SP change with GNR - patient without SP pain or systemic sign of infection   - concern for infection given amount of RBCs on UA  - SP changed 1/23 - urine cloudy with sediment (patient reports both always present)  - Stable non septic    Plan:  1. Continue Meropenem x 7d total (from SP change 1/23 as urine still cloudy) - eoc 1/30  2. Rec SNF placement to complete IV abx  3. Contact isolation given prior ESBL  4. weekly CBC and CMP while on meropenem  5. Discussed with ID staff  6. Needs urology close fu after dc  7. Will sign off          Anticipated Disposition: tbd    Thank you for your consult. I will sign off. Please contact us if you have any additional questions.    JEFFERY Mckeon  Infectious Disease  [facility] - Med Surg    Subjective:     Principal Problem:Dialysis patient    HPI: Cecile Bowen maikel 71F with ESRD (on HD), DM2, HTN, breast cancer s/p mastectomy, and recurrent UTI presented to the ED with chest heaviness on 1/21. Pt recently seen in ED 1/20 for similar chest heaviness which was felt due to volume overload and was DC'd home. She has a chronic suprapubic catheter of 5 years and is bed bound at baseline, she has a wheelchair for mobility but rarely uses it. She was started on HD 2 months ago and has issues  making HD appointments due to transportation. She has missed her last appointments. Last session was Monday prior to discharge from [facility]. She had been at [facility] since 12/30/21 when she was admitted after a long hospital stay after being found down at home 11/28/21. During her hospital stay, she was treated for possible STEMI, worsening CKD, metabolic acidosis and required intubation for acute respiratory failure. She has a history of urinary retention, L sided renal stones and neurogenic bladder with L sided renal stents in place. She underwent exchange on 12/23 and had urine culture showing ESBL Kleb P that was treated with Unasyn though sensitivities show resistant.   She was discharged to [facility] and returned home on 1/17/22 after her HD session.    Since admit she has undergone SP cath change in 1/21 and has been started on cipro.  UA x 2 show WBC >100 and leukocyte 3+ and RBCs 55-80. UCx with gnr.  She denies any systemic sign of infection and reports always having cloudy urine with sediment.  Reports her sx of uti is RBCs in urine.  She is on cipro.  She had HD today and wants to go home.  ID consulted for further evaluation and treatment.       Interval History:   No AEON.  Afebrile and WBC WNL  UCx with ESBL Klep Pneumo x2 - intermediate to ertapenem - Merrem and Bactrim sensitive only  Feels ok  Primary team working on SNF placement  The patient denies any recent fever, chills, or sweats.      Review of Systems   Constitutional: Negative for activity change, chills, diaphoresis and fever.   Respiratory: Negative for cough, shortness of breath and wheezing.    Cardiovascular: Negative for chest pain.   Gastrointestinal: Negative for abdominal pain, constipation, diarrhea, nausea and vomiting.   Genitourinary: Negative for dysuria, frequency and urgency.   Neurological: Negative for dizziness.   Hematological: Does not bruise/bleed easily.     Objective:     Vital Signs (Most Recent):  Temp: 98 °F (36.7  °C) (01/25/22 1200)  Pulse: 83 (01/25/22 1200)  Resp: 18 (01/25/22 1200)  BP: (!) 160/70 (01/25/22 1200)  SpO2: 96 % (01/25/22 1200) Vital Signs (24h Range):  Temp:  [98 °F (36.7 °C)-99 °F (37.2 °C)] 98 °F (36.7 °C)  Pulse:  [] 83  Resp:  [15-20] 18  SpO2:  [92 %-96 %] 96 %  BP: (121-160)/(60-85) 160/70     Weight: 120.7 kg (265 lb 15.8 oz)  Body mass index is 47.12 kg/m².    Estimated Creatinine Clearance: 37.1 mL/min (A) (based on SCr of 1.8 mg/dL (H)).    Physical Exam  Constitutional:       General: She is not in acute distress.     Appearance: She is well-developed. She is obese. She is not ill-appearing, toxic-appearing or diaphoretic.       HENT:      Head: Normocephalic and atraumatic.   Cardiovascular:      Rate and Rhythm: Normal rate and regular rhythm.      Heart sounds: Normal heart sounds. No murmur heard.  No friction rub. No gallop.    Pulmonary:      Effort: Pulmonary effort is normal. No respiratory distress.      Breath sounds: Normal breath sounds. No wheezing or rales.   Abdominal:      General: Bowel sounds are normal. There is no distension.      Palpations: Abdomen is soft. There is no mass.      Tenderness: There is no abdominal tenderness. There is no guarding or rebound.   Skin:     General: Skin is warm and dry.   Neurological:      Mental Status: She is alert and oriented to person, place, and time.   Psychiatric:         Behavior: Behavior normal.         Pics 1/22              Significant Labs:   Blood Culture:   Recent Labs   Lab 09/29/21  2206 11/28/21 2121 11/28/21 2122 12/06/21  0758 12/06/21  0802   LABBLOO No growth after 5 days. Gram stain aer bottle: Gram positive cocci in clusters resembling Staph   Gram stain zelda bottle: Gram negative rods   Results called to and read back by: Love Montemayor, Charge Nurse   11/29/2021  20:32  COAGULASE-NEGATIVE STAPHYLOCOCCUS SPECIES  Organism is a probable contaminant  *  CLOSTRIDIUM SPOROGENES* No growth after 5 days. No growth  after 5 days. No growth after 5 days.     CBC:   Recent Labs   Lab 01/24/22  0339 01/25/22  0506   WBC 5.45 6.28   HGB 8.0* 8.0*   HCT 26.2* 25.6*    241     CMP:   Recent Labs   Lab 01/24/22  0339 01/25/22  0506   * 131*   K 3.3* 3.8   CL 96 95   CO2 25 23   * 161*   BUN 20 25*   CREATININE 1.8* 1.8*   CALCIUM 8.1* 8.0*   ANIONGAP 11 13   EGFRNONAA 28.3* 28.3*     Urine Culture:   Recent Labs   Lab 09/28/21  1730 11/28/21  1940 12/20/21  1928 01/20/22  2216 01/21/22  1402   LABURIN ENTEROCOCCUS FAECALIS  > 100,000 cfu/ml  * Multiple organisms isolated. None in predominance.  Repeat if  clinically necessary. KLEBSIELLA PNEUMONIAE ESBL  >100,000 cfu/ml  No other significant isolate  * KLEBSIELLA PNEUMONIAE ESBL  >100,000 cfu/ml  * KLEBSIELLA PNEUMONIAE ESBL  > 100,000 cfu/ml  *     Urine Studies:   Recent Labs   Lab 09/28/21 1730 11/28/21 1940 01/21/22  1402   COLORU Yellow   < > Yellow   APPEARANCEUA Cloudy*   < > Cloudy*   PHUR 6.0   < > 6.0   SPECGRAV 1.020   < > 1.010   PROTEINUA 3+*   < > 2+*   GLUCUA 2+*   < > 1+*   KETONESU Trace*   < > Negative   BILIRUBINUA Negative   < > Negative   OCCULTUA 2+*   < > 2+*   NITRITE Positive*   < > Negative   LEUKOCYTESUR 3+*   < > 3+*   RBCUA 17*   < > 80*   WBCUA >100*   < > >100*   BACTERIA Occasional   < > Many*   SQUAMEPITHEL 0  --   --    HYALINECASTS 0   < > 1    < > = values in this interval not displayed.     All pertinent labs within the past 24 hours have been reviewed.    Significant Imaging: I have reviewed all pertinent imaging results/findings within the past 24 hours.   X-Ray Chest AP Portable [[id number]] Resulted: 01/21/22 1332   Order Status: Completed Updated: 01/21/22 1336   Narrative:     EXAMINATION:   XR CHEST AP PORTABLE     CLINICAL HISTORY:   chest discomfort;     TECHNIQUE:   Single frontal view of the chest was performed.     COMPARISON:   01/20/2022     FINDINGS:   Again noted is right diaphragmatic elevation with a meniscus  sign, likely indicating at least a small volume right pleural effusion.  The left lung field and pleural space remains clear.  The heart size appears normal.  There is mild calcification of the aortic knob consistent with atherosclerotic stigmata.  Tunneled right hemodialysis catheter noted.    Impression:       No interval detrimental change identified.       Electronically signed by: Rosaline Farah   Date: 01/21/2022   Time: 13:34       Imaging History    2022  Date Procedure Name Status Accession Number Location   01/21/22 01:21 PM X-Ray Chest AP Portable Final [id number] [facility]   01/20/22 09:44 PM X-Ray Chest AP Portable Final [id number] [facility]   2021  Date Procedure Name Status Accession Number Location   12/23/21 10:33 AM SURG [place] Surgery Fluoro Usage Final [id number] [facility]

## 2022-01-25 NOTE — PLAN OF CARE
AAOx4 w/ VSS during shift. CBG monitored and managed w/ insulin. Infection treated w/ IV ABX. Pain managed w/ PO analgesics. Pt did not get dialysis today like scheduled.

## 2022-01-25 NOTE — CONSULTS
[facility] Medicine  Telemedicine Consult Note    Patient Name: Cecile Bowen  MRN: [id number]  Admission Date: 1/21/2022  Hospital Length of Stay: 0 days  Attending Physician: Bashir Moses MD   Primary Care Provider: Kailey Navarro MD     Thank you for your consult to [facility]. We have reviewed the patient chart. This patient does not meet criteria for [facility] service at this time due to [facility] service capacity limitations. Will hand back to In-house service..        Liana Ruiz MD  Department of Hospital Medicine   [facility]

## 2022-01-25 NOTE — ASSESSMENT & PLAN NOTE
History of ESBL and MDRO with current UTI symptoms, dirty UA, and suprapubic catheter.    Plan:  - consult ID, appreciate recs: Plan on 3d of Meropenem then DC on Bactrim DS qod x 3 doses  - transition ertapenem to meropenem  -- midline consult  - UCx ESBL

## 2022-01-25 NOTE — PLAN OF CARE
Problem: Skin Injury Risk Increased  Goal: Skin Health and Integrity  Outcome: Ongoing, Progressing     Problem: Adult Inpatient Plan of Care  Goal: Plan of Care Review  Outcome: Ongoing, Progressing  Goal: Patient-Specific Goal (Individualized)  Outcome: Ongoing, Progressing  Goal: Absence of Hospital-Acquired Illness or Injury  Outcome: Ongoing, Progressing     Problem: Skin Injury Risk Increased  Goal: Skin Health and Integrity  Outcome: Ongoing, Progressing     Problem: Adult Inpatient Plan of Care  Goal: Plan of Care Review  Outcome: Ongoing, Progressing     Problem: Adult Inpatient Plan of Care  Goal: Absence of Hospital-Acquired Illness or Injury  Outcome: Ongoing, Progressing

## 2022-01-25 NOTE — ASSESSMENT & PLAN NOTE
-possibly recovering  -dialysis dependent ZAK secondary to septic shock 1 month prior  -MIKKI, TOSHIA TDC, , still makes lots of urine, [facility] dialysis unit Dr. Hurst  -murillo catheter was declogged in ED with lots of urine output  -significant LE edema and hyponatremia from water intake  -seen on HD today without issue  -made 3.5L urine with stable creatinine after 1 day  -plan to hold dialysis on Thursday; please continue furosemide

## 2022-01-25 NOTE — PLAN OF CARE
Problem: Fluid and Electrolyte Imbalance (Acute Kidney Injury/Impairment)  Goal: Fluid and Electrolyte Balance  Outcome: Ongoing, Progressing

## 2022-01-25 NOTE — PLAN OF CARE
01/25/22 1458   Post-Acute Status   Post-Acute Authorization Placement   Post-Acute Placement Status Referrals Sent   Discharge Plan   Discharge Plan A Skilled Nursing Facility   Discharge Plan B Skilled Nursing Facility     Pt needs SNF placement.  Per CM, pt is agreeable to considering other SNFs since [facility] can not accept the pt. She does not want to go to [facility] or [facility].  The covering SW sent referrals through [facility] to [place] [facility], [facility], [facility], [place] , [facility], [place], [facility], [facility], [facility], [facility], [place], and [place].  SW will f/u as needed.    Update: 4:04 PM  SW called in the [employer] and faxed the Pas[facility].    Janie Abbasi LCSW   PRN

## 2022-01-25 NOTE — ASSESSMENT & PLAN NOTE
Dialysis dependent ZAK  Nephrology consulted, appreciate recs  Suprapubic murillo declogged in ED, see UTI  Low albumin and history of nephrotic syndrome    Plan:  - Trend Cr  - Strict I&Os  - Avoid nephrotoxic agents  - Renally adjust medications  - Prealbumin low  - P/C ratio elevated 9.33  -- History of nephrotic syndrome

## 2022-01-25 NOTE — PROGRESS NOTES
Patient received in dialysis unit via bed. Acute dialysis started via right IJ tunneled catheter. Isolation precautions maintained.

## 2022-01-25 NOTE — PT/OT/SLP PROGRESS
Physical Therapy      Patient Name:  Cecile Bowen   MRN:  [id number]    Patient not seen today secondary to pt off floor at dialysis first attempt, and pt refusal second attempt.   Will follow-up at next scheduled visit per PT POC.

## 2022-01-25 NOTE — PROGRESS NOTES
[facility] Medicine  Progress Note    Patient Name: Cecile Bowen  MRN: [id number]  Patient Class: IP- Inpatient   Admission Date: 1/21/2022  Length of Stay: 0 days  Attending Physician: Bashir Moses MD  Primary Care Provider: Kailey Navarro MD        Subjective:     Principal Problem:Dialysis patient        HPI:  Cecile Bowen maikel 71F with ESRD (on HD), DM2, HTN, breast cancer s/p mastectomy, and recurrent UTI presents with chest heaviness. Pt recently seen in ED for similar chest heaviness. She reports a productive cough and missing some medications. She has a chronic suprapubic catheter of 5 years and is bed bound at baseline, but has a wheelchair for mobility but rarely uses it. She was started on HD 2 months ago and has issues making HD appointments due to transportation. She has missed her last two appointments. Last session was Monday prior to discharge from SNF. She had been at SNF since 12/30/21 when she was admitted after a long hospital stay after being found down at home 11/28/21. During her hospital stay, she was treated for possible STEMI, worsening CKD, metabolic acidosis and required intubation for acute respiratory failure. She was discharged to [facility] and returned home on 1/17/22 after her HD session.    In the ED, she is afebrile and HD stable. Labs positive for dirty UA, hyponatremia, hypokalemia, hypochloridemia, elevated BUN, elevated Cr, and elevated BNP. She was started on Rocephin for UTI and nephrology was consulted for HD. She was admitted to hospital medicine for further management.      Overview/Hospital Course:  Patient admitted for UTI and missed dialysis appointments due to transportation issues. Patient started on Rocephin for UTI and ID was consulted for antibiotic recommendations as patient has history of ESBL and MDRO. Pt transitioned to meropenem followed by Bactrim for 3 days for ESBL UTI. Midline consult ordered for extended abx  coverage. Nephrology was consulted for HD while inpatient. Social work was consulted for discharge planning.       Interval History: NAEON. Afebrile, HD stable. HD this morning. Pending OSNF placement vs HH. D3/3 meropenem for UTI.     Review of Systems   Constitutional: Negative for chills and fever.   HENT: Negative for congestion, nosebleeds and sore throat.    Eyes: Negative for photophobia and pain.   Respiratory: Negative for cough, chest tightness and shortness of breath.    Cardiovascular: Negative for chest pain and palpitations.   Gastrointestinal: Negative for abdominal distention, blood in stool, diarrhea, nausea and vomiting.   Genitourinary: Negative for dysuria and hematuria.        Sx similar to UTIs in past   Musculoskeletal: Negative for back pain and myalgias.   Skin: Negative for rash and wound.   Neurological: Positive for weakness. Negative for dizziness, numbness and headaches.   Psychiatric/Behavioral: Negative for behavioral problems and confusion. The patient is not nervous/anxious.      Objective:     Vital Signs (Most Recent):  Temp: 98.5 °F (36.9 °C) (01/25/22 0732)  Pulse: 80 (01/25/22 0732)  Resp: 15 (01/25/22 0732)  BP: 121/60 (01/25/22 0732)  SpO2: (!) 93 % (01/25/22 0732) Vital Signs (24h Range):  Temp:  [98 °F (36.7 °C)-99 °F (37.2 °C)] 98.5 °F (36.9 °C)  Pulse:  [] 80  Resp:  [15-20] 15  SpO2:  [92 %-96 %] 93 %  BP: (121-148)/(60-68) 121/60     Weight: 120.7 kg (265 lb 15.8 oz)  Body mass index is 47.12 kg/m².    Intake/Output Summary (Last 24 hours) at 1/25/2022 0922  Last data filed at 1/25/2022 0600  Gross per 24 hour   Intake 2575 ml   Output 3575 ml   Net -1000 ml      Physical Exam  Vitals and nursing note reviewed.   Constitutional:       General: She is not in acute distress.     Appearance: Normal appearance. She is obese. She is not ill-appearing.   HENT:      Head: Normocephalic and atraumatic.      Right Ear: External ear normal.      Left Ear: External ear  normal.      Nose: Nose normal.      Mouth/Throat:      Mouth: Mucous membranes are moist.      Pharynx: Oropharynx is clear.   Eyes:      General: No scleral icterus.     Extraocular Movements: Extraocular movements intact.      Pupils: Pupils are equal, round, and reactive to light.   Cardiovascular:      Rate and Rhythm: Normal rate and regular rhythm.      Pulses: Normal pulses.      Heart sounds: Normal heart sounds. No murmur heard.  No gallop.    Pulmonary:      Effort: Pulmonary effort is normal. No respiratory distress.      Breath sounds: Normal breath sounds. No rales.      Comments: Right TDC  Abdominal:      General: Abdomen is flat. Bowel sounds are normal. There is no distension.      Palpations: Abdomen is soft.      Tenderness: There is no abdominal tenderness.      Comments: Suprapubic catheter   Musculoskeletal:         General: No swelling. Normal range of motion.      Cervical back: Normal range of motion and neck supple. No rigidity or tenderness.      Right lower leg: Edema present.      Left lower leg: Edema present.   Skin:     General: Skin is warm and dry.      Capillary Refill: Capillary refill takes less than 2 seconds.      Coloration: Skin is not jaundiced.      Findings: No rash.   Neurological:      General: No focal deficit present.      Mental Status: She is alert and oriented to person, place, and time. Mental status is at baseline.      Motor: Weakness present.   Psychiatric:         Mood and Affect: Mood normal.         Behavior: Behavior normal.         Significant Labs: All pertinent labs within the past 24 hours have been reviewed.    Significant Imaging: I have reviewed all pertinent imaging results/findings within the past 24 hours.      Assessment/Plan:      * Dialysis patient  See CKD4      Secondary hypertension  Proteinuria on labs  Added lisinopril 10mg 1/24      Debility  PT/OT consulted  SNF recommended; pt wants OSNF  -- appreciate SW assistance      A-fib  Continue  apixaban 2.5mg BID      ZAK (acute kidney injury)  Dialysis dependent ZAK  Nephrology consulted, appreciate recs  Suprapubic murillo declogged in ED, see UTI  Low albumin and history of nephrotic syndrome    Plan:  - Trend Cr  - Strict I&Os  - Avoid nephrotoxic agents  - Renally adjust medications  - Prealbumin low  - P/C ratio elevated 9.33  -- History of nephrotic syndrome      Normocytic anemia  At baseline on admission  Anemia of chronic disease    CBC daily  Transfuse if Hbg <7  Checking iron studies, folate, B12, and epo with HD    S/P left mastectomy  See malignant neoplasm of left breast      Requires daily assistance for activities of daily living (ADL) and comfort needs  Social work consulted for transportation to dialysis      Malignant neoplasm of left breast, estrogen receptor positive  History of breast cancer status post radical mastectomy on 8/1/2019 on anastrozole   Stable    Continue anastrozole 1mg daily    Cervicogenic migraine  PRNs ordered      CKD stage 4 due to type 2 diabetes mellitus  Dialysis dependent    Nephrology consulted, appreciate recs      Chronic pain  PRN multimodal pain regimen      Long term prescription opiate use   with Percocet and butalbital-aceaminophen-caffeine  See chronic pain, migraines      Morbid obesity due to excess calories  Body mass index is 47.12 kg/m². Morbid obesity complicates all aspects of disease management from diagnostic modalities to treatment. Weight loss encouraged and health benefits explained to patient.     RD consulted  1500 calorie diabetic diet  BOOST    Suprapubic catheter  See neurogenic bladder      Uncontrolled type 2 diabetes mellitus with stage 4 chronic kidney disease, with long-term current use of insulin  A1c (1/4/2022) 5.4%    Diabetic diet  Continue to monitor  LDSSI  POCT BG    Major depressive disorder, recurrent episode, moderate  Not on any home medications    Continue to monitor  Consider psych consult if worsening  affect        Neurogenic bladder  Continue oxybutynin 10mg daily, furosemide 160mg BID      Recurrent urinary tract infection  History of ESBL and MDRO with current UTI symptoms, dirty UA, and suprapubic catheter.    Plan:  - consult ID, appreciate recs  --  Plan on 3d of Meropenem (D3/3) then DC on Bactrim DS qod x 3 doses  -- Consider cont meropenem vs Bactrim if patient goes to OSNF  -- Exchange murillo cath after abx are complete  - UCx ESBL      Hyponatremia  Chronic hyponatremia 130 baseline, 126 on admission  Secondary to fluid overload, UTI  Nephro consulted for HD      VIJAYA (obstructive sleep apnea)  CPAP QHS      Hyperlipidemia associated with type 2 diabetes mellitus  Continue atorvastatin 80mg daily, gemfibrozil 600mg MWF      Fibromyalgia  PT/OT  Pain regimen      Hypothyroidism  Continue levothyroxine 50mcg daily        VTE Risk Mitigation (From admission, onward)         Ordered     heparin (porcine) injection 1,000 Units  As needed (PRN)         01/24/22 0828     apixaban tablet 2.5 mg  2 times daily         01/21/22 1609     IP VTE HIGH RISK PATIENT  Once         01/21/22 1609     Place sequential compression device  Until discontinued         01/21/22 1609                Discharge Planning   FLORENTINO: 1/26/2022     Code Status: Full Code   Is the patient medically ready for discharge?: Yes    Reason for patient still in hospital (select all that apply): Consult recommendations and Pending disposition                     Veronica Morrow ([place] Name: Javier), MD  Department of Hospital Medicine   [facility] Surg

## 2022-01-25 NOTE — ASSESSMENT & PLAN NOTE
History of ESBL and MDRO with current UTI symptoms, dirty UA, and suprapubic catheter.    Plan:  - consult ID, appreciate recs  --  Plan on 3d of Meropenem (D3/3) then DC on Bactrim DS qod x 3 doses  -- Consider cont meropenem vs Bactrim if patient goes to OSNF  -- Exchange murillo cath after abx are complete  - UCx ESBL

## 2022-01-25 NOTE — SUBJECTIVE & OBJECTIVE
Interval History: HD today, may be recovering and urine output 3.5L    Review of patient's allergies indicates:  No Known Allergies  Current Facility-Administered Medications   Medication Frequency    0.9%  NaCl infusion Once    acetaminophen tablet 650 mg Q4H PRN    anastrozole tablet 1 mg Daily    apixaban tablet 2.5 mg BID    atorvastatin tablet 80 mg Daily    butalbital-acetaminophen-caffeine -40 mg per tablet 1 tablet Daily PRN    cloNIDine tablet 0.2 mg TID    dextrose 50% injection 12.5 g PRN    dextrose 50% injection 25 g PRN    epoetin chloe-epbx injection 6,000 Units Every Tues, Thurs, Sat    ergocalciferol capsule 50,000 Units Q7 Days    famotidine tablet 20 mg Daily    furosemide tablet 160 mg BID    gemfibroziL tablet 600 mg Every Mon, Wed, Fri    glucagon (human recombinant) injection 1 mg PRN    glucose chewable tablet 16 g PRN    glucose chewable tablet 24 g PRN    heparin (porcine) injection 1,000 Units PRN    insulin aspart U-100 pen 0-5 Units QID (AC + HS) PRN    levothyroxine tablet 50 mcg Before breakfast    lisinopriL tablet 10 mg Daily    melatonin tablet 6 mg Nightly PRN    meropenem-0.9% sodium chloride 1 g/50 mL IVPB Q24H    methocarbamoL tablet 750 mg TID PRN    metoprolol tartrate (LOPRESSOR) tablet 25 mg BID    mupirocin 2 % ointment BID    ondansetron injection 4 mg Q8H PRN    oxybutynin 24 hr tablet 10 mg Daily    oxyCODONE immediate release tablet Tab 10 mg Q4H PRN    polyethylene glycol packet 17 g BID    senna-docusate 8.6-50 mg per tablet 1 tablet BID    sodium chloride 0.9% bolus 250 mL PRN    sodium chloride 0.9% flush 10 mL PRN    sumatriptan tablet 100 mg BID PRN       Objective:     Vital Signs (Most Recent):  Temp: 98 °F (36.7 °C) (01/25/22 1200)  Pulse: 83 (01/25/22 1200)  Resp: 18 (01/25/22 1200)  BP: (!) 160/70 (01/25/22 1200)  SpO2: 96 % (01/25/22 1200)  O2 Device (Oxygen Therapy): room air (01/25/22 1200) Vital Signs (24h  Range):  Temp:  [98 °F (36.7 °C)-99 °F (37.2 °C)] 98 °F (36.7 °C)  Pulse:  [] 83  Resp:  [15-20] 18  SpO2:  [92 %-96 %] 96 %  BP: (121-160)/(60-85) 160/70     Weight: 120.7 kg (265 lb 15.8 oz) (01/22/22 1919)  Body mass index is 47.12 kg/m².  Body surface area is 2.32 meters squared.    I/O last 3 completed shifts:  In: 2875 [P.O.:2875]  Out: 4075 [Urine:4075]    Physical Exam  Constitutional:       General: She is not in acute distress.     Appearance: She is obese.   HENT:      Mouth/Throat:      Mouth: Mucous membranes are moist.   Eyes:      General: No scleral icterus.     Extraocular Movements: Extraocular movements intact.      Pupils: Pupils are equal, round, and reactive to light.   Cardiovascular:      Rate and Rhythm: Normal rate and regular rhythm.   Pulmonary:      Effort: Pulmonary effort is normal. No respiratory distress.   Abdominal:      General: There is no distension.      Palpations: Abdomen is soft.   Musculoskeletal:         General: Deformity (RUE TDC) present.      Right lower leg: Edema present.      Left lower leg: Edema present.   Skin:     Coloration: Skin is not jaundiced.   Neurological:      General: No focal deficit present.      Mental Status: She is alert.         Significant Labs:  All labs within the past 24 hours have been reviewed.     Significant Imaging:  Labs: Reviewed

## 2022-01-25 NOTE — PROGRESS NOTES
[facility]  Nephrology  Progress Note    Patient Name: Cecile Bowen  MRN: [id number]  Admission Date: 1/21/2022  Hospital Length of Stay: 0 days  Attending Provider: Bashir Moses MD   Primary Care Physician: Kailey Navarro MD  Principal Problem:Dialysis patient    Subjective:     HPI: 71 year old female with dialysis dependent ZAK here because she felt bad and could get to her dialysis unit. She says she couldn't get to dialysis because she couldn't arrange transportation. Last HD 1/17/22.  Nephrology consulted for ESKD.      Interval History: HD today, may be recovering and urine output 3.5L    Review of patient's allergies indicates:  No Known Allergies  Current Facility-Administered Medications   Medication Frequency    0.9%  NaCl infusion Once    acetaminophen tablet 650 mg Q4H PRN    anastrozole tablet 1 mg Daily    apixaban tablet 2.5 mg BID    atorvastatin tablet 80 mg Daily    butalbital-acetaminophen-caffeine -40 mg per tablet 1 tablet Daily PRN    cloNIDine tablet 0.2 mg TID    dextrose 50% injection 12.5 g PRN    dextrose 50% injection 25 g PRN    epoetin chloe-epbx injection 6,000 Units Every Tues, Thurs, Sat    ergocalciferol capsule 50,000 Units Q7 Days    famotidine tablet 20 mg Daily    furosemide tablet 160 mg BID    gemfibroziL tablet 600 mg Every Mon, Wed, Fri    glucagon (human recombinant) injection 1 mg PRN    glucose chewable tablet 16 g PRN    glucose chewable tablet 24 g PRN    heparin (porcine) injection 1,000 Units PRN    insulin aspart U-100 pen 0-5 Units QID (AC + HS) PRN    levothyroxine tablet 50 mcg Before breakfast    lisinopriL tablet 10 mg Daily    melatonin tablet 6 mg Nightly PRN    meropenem-0.9% sodium chloride 1 g/50 mL IVPB Q24H    methocarbamoL tablet 750 mg TID PRN    metoprolol tartrate (LOPRESSOR) tablet 25 mg BID    mupirocin 2 % ointment BID    ondansetron injection 4 mg Q8H PRN    oxybutynin 24 hr tablet 10 mg  Daily    oxyCODONE immediate release tablet Tab 10 mg Q4H PRN    polyethylene glycol packet 17 g BID    senna-docusate 8.6-50 mg per tablet 1 tablet BID    sodium chloride 0.9% bolus 250 mL PRN    sodium chloride 0.9% flush 10 mL PRN    sumatriptan tablet 100 mg BID PRN       Objective:     Vital Signs (Most Recent):  Temp: 98 °F (36.7 °C) (01/25/22 1200)  Pulse: 83 (01/25/22 1200)  Resp: 18 (01/25/22 1200)  BP: (!) 160/70 (01/25/22 1200)  SpO2: 96 % (01/25/22 1200)  O2 Device (Oxygen Therapy): room air (01/25/22 1200) Vital Signs (24h Range):  Temp:  [98 °F (36.7 °C)-99 °F (37.2 °C)] 98 °F (36.7 °C)  Pulse:  [] 83  Resp:  [15-20] 18  SpO2:  [92 %-96 %] 96 %  BP: (121-160)/(60-85) 160/70     Weight: 120.7 kg (265 lb 15.8 oz) (01/22/22 1919)  Body mass index is 47.12 kg/m².  Body surface area is 2.32 meters squared.    I/O last 3 completed shifts:  In: 2875 [P.O.:2875]  Out: 4075 [Urine:4075]    Physical Exam  Constitutional:       General: She is not in acute distress.     Appearance: She is obese.   HENT:      Mouth/Throat:      Mouth: Mucous membranes are moist.   Eyes:      General: No scleral icterus.     Extraocular Movements: Extraocular movements intact.      Pupils: Pupils are equal, round, and reactive to light.   Cardiovascular:      Rate and Rhythm: Normal rate and regular rhythm.   Pulmonary:      Effort: Pulmonary effort is normal. No respiratory distress.   Abdominal:      General: There is no distension.      Palpations: Abdomen is soft.   Musculoskeletal:         General: Deformity (RUE TDC) present.      Right lower leg: Edema present.      Left lower leg: Edema present.   Skin:     Coloration: Skin is not jaundiced.   Neurological:      General: No focal deficit present.      Mental Status: She is alert.         Significant Labs:  All labs within the past 24 hours have been reviewed.     Significant Imaging:  Labs: Reviewed    Assessment/Plan:     ZAK (acute kidney injury)  -possibly  recovering  -dialysis dependent ZAK secondary to septic shock 1 month prior  -MWF, TOSHIA TDC, , still makes lots of urine, [facility] dialysis unit Dr. Hurst  -murillo catheter was declogged in ED with lots of urine output  -significant LE edema and hyponatremia from water intake  -seen on HD today without issue  -made 3.5L urine with stable creatinine after 1 day  -plan to hold dialysis on Thursday; please continue furosemide    Anemia of chronic disease  -epo TIW  -IV iron outpatient    Chronic kidney disease-mineral and bone disorder  -start vit d 2000 daily    Debility  -per primary          Emmanuel Hare MD  Nephrology  [facility]

## 2022-01-25 NOTE — SUBJECTIVE & OBJECTIVE
Interval History: NAEON. Afebrile, HD stable. HD this morning. Pending OSNF placement vs HH. D3/3 meropenem for UTI.     Review of Systems   Constitutional: Negative for chills and fever.   HENT: Negative for congestion, nosebleeds and sore throat.    Eyes: Negative for photophobia and pain.   Respiratory: Negative for cough, chest tightness and shortness of breath.    Cardiovascular: Negative for chest pain and palpitations.   Gastrointestinal: Negative for abdominal distention, blood in stool, diarrhea, nausea and vomiting.   Genitourinary: Negative for dysuria and hematuria.        Sx similar to UTIs in past   Musculoskeletal: Negative for back pain and myalgias.   Skin: Negative for rash and wound.   Neurological: Positive for weakness. Negative for dizziness, numbness and headaches.   Psychiatric/Behavioral: Negative for behavioral problems and confusion. The patient is not nervous/anxious.      Objective:     Vital Signs (Most Recent):  Temp: 98.5 °F (36.9 °C) (01/25/22 0732)  Pulse: 80 (01/25/22 0732)  Resp: 15 (01/25/22 0732)  BP: 121/60 (01/25/22 0732)  SpO2: (!) 93 % (01/25/22 0732) Vital Signs (24h Range):  Temp:  [98 °F (36.7 °C)-99 °F (37.2 °C)] 98.5 °F (36.9 °C)  Pulse:  [] 80  Resp:  [15-20] 15  SpO2:  [92 %-96 %] 93 %  BP: (121-148)/(60-68) 121/60     Weight: 120.7 kg (265 lb 15.8 oz)  Body mass index is 47.12 kg/m².    Intake/Output Summary (Last 24 hours) at 1/25/2022 0922  Last data filed at 1/25/2022 0600  Gross per 24 hour   Intake 2575 ml   Output 3575 ml   Net -1000 ml      Physical Exam  Vitals and nursing note reviewed.   Constitutional:       General: She is not in acute distress.     Appearance: Normal appearance. She is obese. She is not ill-appearing.   HENT:      Head: Normocephalic and atraumatic.      Right Ear: External ear normal.      Left Ear: External ear normal.      Nose: Nose normal.      Mouth/Throat:      Mouth: Mucous membranes are moist.      Pharynx: Oropharynx is  clear.   Eyes:      General: No scleral icterus.     Extraocular Movements: Extraocular movements intact.      Pupils: Pupils are equal, round, and reactive to light.   Cardiovascular:      Rate and Rhythm: Normal rate and regular rhythm.      Pulses: Normal pulses.      Heart sounds: Normal heart sounds. No murmur heard.  No gallop.    Pulmonary:      Effort: Pulmonary effort is normal. No respiratory distress.      Breath sounds: Normal breath sounds. No rales.      Comments: Right TDC  Abdominal:      General: Abdomen is flat. Bowel sounds are normal. There is no distension.      Palpations: Abdomen is soft.      Tenderness: There is no abdominal tenderness.      Comments: Suprapubic catheter   Musculoskeletal:         General: No swelling. Normal range of motion.      Cervical back: Normal range of motion and neck supple. No rigidity or tenderness.      Right lower leg: Edema present.      Left lower leg: Edema present.   Skin:     General: Skin is warm and dry.      Capillary Refill: Capillary refill takes less than 2 seconds.      Coloration: Skin is not jaundiced.      Findings: No rash.   Neurological:      General: No focal deficit present.      Mental Status: She is alert and oriented to person, place, and time. Mental status is at baseline.      Motor: Weakness present.   Psychiatric:         Mood and Affect: Mood normal.         Behavior: Behavior normal.         Significant Labs: All pertinent labs within the past 24 hours have been reviewed.    Significant Imaging: I have reviewed all pertinent imaging results/findings within the past 24 hours.

## 2022-01-25 NOTE — PROGRESS NOTES
[facility]  Infectious Disease  Progress Note    Patient Name: Cecile Bowen  MRN: [id number]  Admission Date: 1/21/2022  Length of Stay: 0 days  Attending Physician: Bashir Moses MD  Primary Care Provider: Kailey Navarro MD    Isolation Status: Contact  Assessment/Plan:      Suprapubic catheter  70 yo female with Hx MDRO UTI, L renal stones and stent with SP catheter secondary to neurogenic bladder and obstruction admitted for HD with cf UTI as urine appeared purulent.  - UA x 2 positive but patient without SP pain or systemic sign of infection  - prior UCX last admit in Dec with ESBL Kleb pneumo - ertapenem and cipro sensitive  - UCX now showing ESBL Kleb Pneumo (intermediate to ertapenem) - meropenem sensitive and repeat UCX post SP change with GNR   - concern for infection given amount of RBCs on UA  - Stable non septic    Plan:  1. Continue Meropenem  2. FU UCX results from 1/21 with GNR - need to know sensitivities prior to DC  3. Contact isolation given prior ESBL  4. Please exchange murillo as likely colonized, be a retained nidus for infection and re seed urine once abx stopped.   5. Plan on 3d of Meropenem then DC on Bactrim DS qod x 3 doses  6. Will follow  7. Discussed with ID staff        Anticipated Disposition:  tbd    Thank you for your consult. I will follow-up with patient. Please contact us if you have any additional questions.    JEFFERY Mckeon  Infectious Disease  [facility]    Subjective:     Principal Problem:Dialysis patient    HPI: Cecile Bowen maikel 71F with ESRD (on HD), DM2, HTN, breast cancer s/p mastectomy, and recurrent UTI presented to the ED with chest heaviness on 1/21. Pt recently seen in ED 1/20 for similar chest heaviness which was felt due to volume overload and was DC'd home. She has a chronic suprapubic catheter of 5 years and is bed bound at baseline, she has a wheelchair for mobility but rarely uses it. She was started on HD 2 months ago  and has issues making HD appointments due to transportation. She has missed her last appointments. Last session was Monday prior to discharge from [facility]. She had been at [facility] since 12/30/21 when she was admitted after a long hospital stay after being found down at home 11/28/21. During her hospital stay, she was treated for possible STEMI, worsening CKD, metabolic acidosis and required intubation for acute respiratory failure. She has a history of urinary retention, L sided renal stones and neurogenic bladder with L sided renal stents in place. She underwent exchange on 12/23 and had urine culture showing ESBL Kleb P that was treated with Unasyn though sensitivities show resistant.   She was discharged to [facility] and returned home on 1/17/22 after her HD session.    Since admit she has undergone SP cath change in 1/21 and has been started on cipro.  UA x 2 show WBC >100 and leukocyte 3+ and RBCs 55-80. UCx with gnr.  She denies any systemic sign of infection and reports always having cloudy urine with sediment.  Reports her sx of uti is RBCs in urine.  She is on cipro.  She had HD today and wants to go home.  ID consulted for further evaluation and treatment.       Interval History:   No AEON.  Afebrile and WBC WNL  UCx with ESBL Klep Pneumo - intermediate to ertapenem - Merrem and Bactrim sensitive only  Repeat urine culture post change with GNR - likely thsame  The patient denies any recent fever, chills, or sweats.      Review of Systems   Constitutional: Negative for activity change, chills, diaphoresis and fever.   Respiratory: Negative for cough, shortness of breath and wheezing.    Cardiovascular: Negative for chest pain.   Gastrointestinal: Negative for abdominal pain, constipation, diarrhea, nausea and vomiting.   Genitourinary: Negative for dysuria, frequency and urgency.   Neurological: Negative for dizziness.   Hematological: Does not bruise/bleed easily.     Objective:     Vital Signs (Most  Recent):  Temp: 99 °F (37.2 °C) (01/24/22 1613)  Pulse: 76 (01/24/22 1613)  Resp: 18 (01/24/22 1613)  BP: 126/65 (01/24/22 1613)  SpO2: 96 % (01/24/22 1613) Vital Signs (24h Range):  Temp:  [98.1 °F (36.7 °C)-99.1 °F (37.3 °C)] 99 °F (37.2 °C)  Pulse:  [70-87] 76  Resp:  [17-18] 18  SpO2:  [93 %-96 %] 96 %  BP: (126-174)/(65-74) 126/65     Weight: 120.7 kg (265 lb 15.8 oz)  Body mass index is 47.12 kg/m².    Estimated Creatinine Clearance: 37.1 mL/min (A) (based on SCr of 1.8 mg/dL (H)).    Physical Exam  Constitutional:       General: She is not in acute distress.     Appearance: She is well-developed. She is obese. She is not ill-appearing, toxic-appearing or diaphoretic.   HENT:      Head: Normocephalic and atraumatic.   Cardiovascular:      Rate and Rhythm: Normal rate and regular rhythm.      Heart sounds: Normal heart sounds. No murmur heard.  No friction rub. No gallop.    Pulmonary:      Effort: Pulmonary effort is normal. No respiratory distress.      Breath sounds: Normal breath sounds. No wheezing or rales.   Abdominal:      General: Bowel sounds are normal. There is no distension.      Palpations: Abdomen is soft. There is no mass.      Tenderness: There is no abdominal tenderness. There is no guarding or rebound.   Skin:     General: Skin is warm and dry.   Neurological:      Mental Status: She is alert and oriented to person, place, and time.   Psychiatric:         Behavior: Behavior normal.     Pics 1/22              Significant Labs:   Blood Culture:   Recent Labs   Lab 09/29/21 2206 11/28/21 2121 11/28/21 2122 12/06/21  0758 12/06/21  0802   LABBLOO No growth after 5 days. Gram stain aer bottle: Gram positive cocci in clusters resembling Staph   Gram stain zelda bottle: Gram negative rods   Results called to and read back by: Loev Montemayor, Charge Nurse   11/29/2021  20:32  COAGULASE-NEGATIVE STAPHYLOCOCCUS SPECIES  Organism is a probable contaminant  *  CLOSTRIDIUM SPOROGENES* No growth after  5 days. No growth after 5 days. No growth after 5 days.     CBC:   Recent Labs   Lab 01/23/22  0332 01/24/22  0339   WBC 5.35 5.45   HGB 7.8* 8.0*   HCT 24.8* 26.2*    237     CMP:   Recent Labs   Lab 01/23/22  0332 01/24/22  0339   * 132*   K 3.4* 3.3*   CL 99 96   CO2 25 25   * 158*   BUN 16 20   CREATININE 1.3 1.8*   CALCIUM 8.0* 8.1*   ANIONGAP 10 11   EGFRNONAA 42.0* 28.3*     Urine Culture:   Recent Labs   Lab 09/28/21  1730 11/28/21  1940 12/20/21 1928 01/20/22  2216 01/21/22  1402   LABURIN ENTEROCOCCUS FAECALIS  > 100,000 cfu/ml  * Multiple organisms isolated. None in predominance.  Repeat if  clinically necessary. KLEBSIELLA PNEUMONIAE ESBL  >100,000 cfu/ml  No other significant isolate  * KLEBSIELLA PNEUMONIAE ESBL  >100,000 cfu/ml  * GRAM NEGATIVE FERNANDEZ  > 100,000 cfu/ml  Identification and susceptibility pending  *     Urine Studies:   Recent Labs   Lab 09/28/21 1730 11/28/21 1940 01/21/22  1402   COLORU Yellow   < > Yellow   APPEARANCEUA Cloudy*   < > Cloudy*   PHUR 6.0   < > 6.0   SPECGRAV 1.020   < > 1.010   PROTEINUA 3+*   < > 2+*   GLUCUA 2+*   < > 1+*   KETONESU Trace*   < > Negative   BILIRUBINUA Negative   < > Negative   OCCULTUA 2+*   < > 2+*   NITRITE Positive*   < > Negative   LEUKOCYTESUR 3+*   < > 3+*   RBCUA 17*   < > 80*   WBCUA >100*   < > >100*   BACTERIA Occasional   < > Many*   SQUAMEPITHEL 0  --   --    HYALINECASTS 0   < > 1    < > = values in this interval not displayed.     All pertinent labs within the past 24 hours have been reviewed.    Significant Imaging: I have reviewed all pertinent imaging results/findings within the past 24 hours.   X-Ray Chest AP Portable [[id number]] Resulted: 01/21/22 1335   Order Status: Completed Updated: 01/21/22 1336   Narrative:     EXAMINATION:   XR CHEST AP PORTABLE     CLINICAL HISTORY:   chest discomfort;     TECHNIQUE:   Single frontal view of the chest was performed.     COMPARISON:   01/20/2022     FINDINGS:   Again  noted is right diaphragmatic elevation with a meniscus sign, likely indicating at least a small volume right pleural effusion.  The left lung field and pleural space remains clear.  The heart size appears normal.  There is mild calcification of the aortic knob consistent with atherosclerotic stigmata.  Tunneled right hemodialysis catheter noted.    Impression:       No interval detrimental change identified.       Electronically signed by: Rosaline Farah   Date: 01/21/2022   Time: 13:34       Imaging History    2022  Date Procedure Name Status Accession Number Location   01/21/22 01:21 PM X-Ray Chest AP Portable Final [id number] [facility]   01/20/22 09:44 PM X-Ray Chest AP Portable Final [id number] [facility]   2021  Date Procedure Name Status Accession Number Location   12/23/21 10:33 AM SURG [place] Surgery Fluoro Usage Final [id number] [facility]

## 2022-01-25 NOTE — ASSESSMENT & PLAN NOTE
History of ESBL and MDRO with current UTI symptoms, dirty UA, and suprapubic catheter.    Plan:  - consult ID, appreciate recs  --  Plan on 3d of Meropenem (D3/3) then DC on Bactrim DS qod x 3 doses  -- Consider cont meropenem vs Bactrim if patient goes to OSNF  -- Exchange murillo cath after abx are complete  - UCx ESBL    I/D Recs as follows:  Plan: 1. Continue Meropenem x 7d total (from SP change 1/23 as urine still cloudy) - eoc 1/30 2. Rec SNF placement to complete IV abx 3. Contact isolation given prior ESBL 4. weekl CBC and CMP while on meropene 5. Discussed with ID staff 6. Needs urology close fu after dc 7. Will sign off

## 2022-01-25 NOTE — PROGRESS NOTES
[facility] Medicine  Progress Note    Patient Name: Cecile Bowen  MRN: [id number]  Patient Class: OP- Observation   Admission Date: 1/21/2022  Length of Stay: 0 days  Attending Physician: Bashir Moses MD  Primary Care Provider: Kailey Navarro MD        Subjective:     Principal Problem:Dialysis patient        HPI:  Cecile Bowen maikel 71F with ESRD (on HD), DM2, HTN, breast cancer s/p mastectomy, and recurrent UTI presents with chest heaviness. Pt recently seen in ED for similar chest heaviness. She reports a productive cough and missing some medications. She has a chronic suprapubic catheter of 5 years and is bed bound at baseline, but has a wheelchair for mobility but rarely uses it. She was started on HD 2 months ago and has issues making HD appointments due to transportation. She has missed her last two appointments. Last session was Monday prior to discharge from SNF. She had been at SNF since 12/30/21 when she was admitted after a long hospital stay after being found down at home 11/28/21. During her hospital stay, she was treated for possible STEMI, worsening CKD, metabolic acidosis and required intubation for acute respiratory failure. She was discharged to [facility] and returned home on 1/17/22 after her HD session.    In the ED, she is afebrile and HD stable. Labs positive for dirty UA, hyponatremia, hypokalemia, hypochloridemia, elevated BUN, elevated Cr, and elevated BNP. She was started on Rocephin for UTI and nephrology was consulted for HD. She was admitted to hospital medicine for further management.      Overview/Hospital Course:  Patient admitted for UTI and missed dialysis appointments due to transportation issues. Patient started on Rocephin for UTI and ID was consulted for antibiotic recommendations as patient has history of ESBL and MDRO. Pt transitioned to meropenem for ESBL UTI. Midline consult ordered for extended abx coverage. Nephrology was consulted  for HD while inpatient. Social work was consulted for discharge planning.       Interval History: Patient seen and examined today. Denies compliants. Eager to go home but amendable to [facility]. She is in good spirits otherwise. Discussed need to have plan for return home to avoid NH placement in future.     Review of Systems   Constitutional: Negative for chills and fever.   HENT: Negative for congestion, nosebleeds and sore throat.    Eyes: Negative for photophobia and pain.   Respiratory: Negative for cough, chest tightness and shortness of breath.    Cardiovascular: Negative for chest pain and palpitations.   Gastrointestinal: Negative for abdominal distention, blood in stool, diarrhea, nausea and vomiting.   Genitourinary: Negative for dysuria and hematuria.        Sx similar to UTIs in past   Musculoskeletal: Negative for back pain and myalgias.   Skin: Negative for rash and wound.   Neurological: Positive for weakness. Negative for dizziness, numbness and headaches.   Psychiatric/Behavioral: Negative for behavioral problems and confusion. The patient is not nervous/anxious.      Objective:     Vital Signs (Most Recent):  Temp: 99 °F (37.2 °C) (01/24/22 1613)  Pulse: 82 (01/24/22 1909)  Resp: 18 (01/24/22 1613)  BP: 134/66 (01/24/22 1909)  SpO2: (!) 92 % (01/24/22 1909) Vital Signs (24h Range):  Temp:  [98.1 °F (36.7 °C)-99 °F (37.2 °C)] 99 °F (37.2 °C)  Pulse:  [70-87] 82  Resp:  [17-18] 18  SpO2:  [92 %-96 %] 92 %  BP: (126-174)/(65-74) 134/66     Weight: 120.7 kg (265 lb 15.8 oz)  Body mass index is 47.12 kg/m².    Intake/Output Summary (Last 24 hours) at 1/24/2022 1914  Last data filed at 1/24/2022 1400  Gross per 24 hour   Intake 300 ml   Output 2325 ml   Net -2025 ml      Physical Exam  Vitals and nursing note reviewed.   Constitutional:       General: She is not in acute distress.     Appearance: Normal appearance. She is obese. She is not ill-appearing.   HENT:      Head: Normocephalic and  atraumatic.      Right Ear: External ear normal.      Left Ear: External ear normal.      Nose: Nose normal.      Mouth/Throat:      Mouth: Mucous membranes are moist.      Pharynx: Oropharynx is clear.   Eyes:      General: No scleral icterus.     Extraocular Movements: Extraocular movements intact.      Pupils: Pupils are equal, round, and reactive to light.   Cardiovascular:      Rate and Rhythm: Normal rate and regular rhythm.      Pulses: Normal pulses.      Heart sounds: Normal heart sounds. No murmur heard.  No gallop.    Pulmonary:      Effort: Pulmonary effort is normal. No respiratory distress.      Breath sounds: Normal breath sounds. No rales.      Comments: Right TDC  Abdominal:      General: Abdomen is flat. Bowel sounds are normal. There is no distension.      Palpations: Abdomen is soft.      Tenderness: There is no abdominal tenderness.      Comments: Suprapubic catheter   Musculoskeletal:         General: No swelling. Normal range of motion.      Cervical back: Normal range of motion and neck supple. No rigidity or tenderness.      Right lower leg: Edema present.      Left lower leg: Edema present.   Skin:     General: Skin is warm and dry.      Capillary Refill: Capillary refill takes less than 2 seconds.      Coloration: Skin is not jaundiced.      Findings: No rash.   Neurological:      General: No focal deficit present.      Mental Status: She is alert and oriented to person, place, and time. Mental status is at baseline.      Motor: Weakness present.   Psychiatric:         Mood and Affect: Mood normal.         Behavior: Behavior normal.         Significant Labs:   All pertinent labs within the past 24 hours have been reviewed.  CBC:   Recent Labs   Lab 01/23/22  0332 01/24/22  0339   WBC 5.35 5.45   HGB 7.8* 8.0*   HCT 24.8* 26.2*    237     CMP:   Recent Labs   Lab 01/23/22  0332 01/24/22  0339   * 132*   K 3.4* 3.3*   CL 99 96   CO2 25 25   * 158*   BUN 16 20   CREATININE  1.3 1.8*   CALCIUM 8.0* 8.1*   ANIONGAP 10 11   EGFRNONAA 42.0* 28.3*       Significant Imaging: I have reviewed all pertinent imaging results/findings within the past 24 hours.      Assessment/Plan:      * Dialysis patient  See CKD4      Secondary hypertension  Proteinuria on labs  Added lisinopril 10mg 1/24      Debility  PT/OT consulted  SNF recommended; pt wants OSNF      A-fib  Continue apixaban 2.5mg BID      ZAK (acute kidney injury)  Dialysis dependent ZAK  Nephrology consulted, appreciate recs  Suprapubic murillo declogged in ED, see UTI  Low albumin and history of nephrotic syndrome    Plan:  - Trend Cr  - Strict I&Os  - Avoid nephrotoxic agents  - Renally adjust medications  - Prealbumin low  - f/u P/C ratio      Normocytic anemia  At baseline on admission  Anemia of chronic disease    CBC daily  Transfuse if Hbg <7      S/P left mastectomy  See malignant neoplasm of left breast      Requires daily assistance for activities of daily living (ADL) and comfort needs  Social work consulted for transportation to dialysis      Malignant neoplasm of left breast, estrogen receptor positive  History of breast cancer status post radical mastectomy on 8/1/2019 on anastrozole   Stable    Continue anastrozole 1mg daily    Cervicogenic migraine  PRNs ordered      CKD stage 4 due to type 2 diabetes mellitus  Dialysis dependent    Nephrology consulted, appreciate recs      Chronic pain  PRN multimodal pain regimen      Long term prescription opiate use   with Percocet and butalbital-aceaminophen-caffeine  See chronic pain, migraines      Morbid obesity due to excess calories  Body mass index is 47.12 kg/m². Morbid obesity complicates all aspects of disease management from diagnostic modalities to treatment. Weight loss encouraged and health benefits explained to patient.     RD consulted  1500 calorie diabetic diet  BOOST    Suprapubic catheter  See neurogenic bladder      Uncontrolled type 2 diabetes mellitus with  stage 4 chronic kidney disease, with long-term current use of insulin  A1c (1/4/2022) 5.4%    Diabetic diet  Continue to monitor  LDSSI  POCT BG    Major depressive disorder, recurrent episode, moderate  Not on any home medications    Continue to monitor  Consider psych consult if worsening affect        Neurogenic bladder  Continue oxybutynin 10mg daily, furosemide 160mg BID      Recurrent urinary tract infection  History of ESBL and MDRO with current UTI symptoms, dirty UA, and suprapubic catheter.    Plan:  - consult ID, appreciate recs: Plan on 3d of Meropenem then DC on Bactrim DS qod x 3 doses  - transition ertapenem to meropenem  -- midline consult  - UCx ESBL      Hyponatremia  Chronic hyponatremia 130 baseline, 126 on admission  Secondary to fluid overload, UTI  Nephro consulted for HD      VIJAYA (obstructive sleep apnea)  CPAP QHS      Hyperlipidemia associated with type 2 diabetes mellitus  Continue atorvastatin 80mg daily, gemfibrozil 600mg MWF      Fibromyalgia  PT/OT  Pain regimen      Hypothyroidism  Continue levothyroxine 50mcg daily        VTE Risk Mitigation (From admission, onward)         Ordered     heparin (porcine) injection 1,000 Units  As needed (PRN)         01/24/22 0828     heparin (porcine) injection 1,000 Units  As needed (PRN)         01/24/22 0827     apixaban tablet 2.5 mg  2 times daily         01/21/22 1609     IP VTE HIGH RISK PATIENT  Once         01/21/22 1609     Place sequential compression device  Until discontinued         01/21/22 1609                Discharge Planning   FLORENTINO: 1/26/2022     Code Status: Full Code   Is the patient medically ready for discharge?:     Reason for patient still in hospital (select all that apply): Patient trending condition, Consult recommendations and PT / OT recommendations                     Vero Degroot MD  Department of Hospital Medicine   [facility]

## 2022-01-25 NOTE — SUBJECTIVE & OBJECTIVE
Interval History:   No AEON.  Afebrile and WBC WNL  UCx with ESBL Klep Pneumo x2 - intermediate to ertapenem - Merrem and Bactrim sensitive only  Feels ok  Primary team working on SNF placement  The patient denies any recent fever, chills, or sweats.      Review of Systems   Constitutional: Negative for activity change, chills, diaphoresis and fever.   Respiratory: Negative for cough, shortness of breath and wheezing.    Cardiovascular: Negative for chest pain.   Gastrointestinal: Negative for abdominal pain, constipation, diarrhea, nausea and vomiting.   Genitourinary: Negative for dysuria, frequency and urgency.   Neurological: Negative for dizziness.   Hematological: Does not bruise/bleed easily.     Objective:     Vital Signs (Most Recent):  Temp: 98 °F (36.7 °C) (01/25/22 1200)  Pulse: 83 (01/25/22 1200)  Resp: 18 (01/25/22 1200)  BP: (!) 160/70 (01/25/22 1200)  SpO2: 96 % (01/25/22 1200) Vital Signs (24h Range):  Temp:  [98 °F (36.7 °C)-99 °F (37.2 °C)] 98 °F (36.7 °C)  Pulse:  [] 83  Resp:  [15-20] 18  SpO2:  [92 %-96 %] 96 %  BP: (121-160)/(60-85) 160/70     Weight: 120.7 kg (265 lb 15.8 oz)  Body mass index is 47.12 kg/m².    Estimated Creatinine Clearance: 37.1 mL/min (A) (based on SCr of 1.8 mg/dL (H)).    Physical Exam  Constitutional:       General: She is not in acute distress.     Appearance: She is well-developed. She is obese. She is not ill-appearing, toxic-appearing or diaphoretic.       HENT:      Head: Normocephalic and atraumatic.   Cardiovascular:      Rate and Rhythm: Normal rate and regular rhythm.      Heart sounds: Normal heart sounds. No murmur heard.  No friction rub. No gallop.    Pulmonary:      Effort: Pulmonary effort is normal. No respiratory distress.      Breath sounds: Normal breath sounds. No wheezing or rales.   Abdominal:      General: Bowel sounds are normal. There is no distension.      Palpations: Abdomen is soft. There is no mass.      Tenderness: There is no  abdominal tenderness. There is no guarding or rebound.   Skin:     General: Skin is warm and dry.   Neurological:      Mental Status: She is alert and oriented to person, place, and time.   Psychiatric:         Behavior: Behavior normal.         Pics 1/22              Significant Labs:   Blood Culture:   Recent Labs   Lab 09/29/21 2206 11/28/21 2121 11/28/21 2122 12/06/21  0758 12/06/21  0802   LABBLOO No growth after 5 days. Gram stain aer bottle: Gram positive cocci in clusters resembling Staph   Gram stain zelda bottle: Gram negative rods   Results called to and read back by: Love Montemayor, Charge Nurse   11/29/2021  20:32  COAGULASE-NEGATIVE STAPHYLOCOCCUS SPECIES  Organism is a probable contaminant  *  CLOSTRIDIUM SPOROGENES* No growth after 5 days. No growth after 5 days. No growth after 5 days.     CBC:   Recent Labs   Lab 01/24/22 0339 01/25/22  0506   WBC 5.45 6.28   HGB 8.0* 8.0*   HCT 26.2* 25.6*    241     CMP:   Recent Labs   Lab 01/24/22 0339 01/25/22  0506   * 131*   K 3.3* 3.8   CL 96 95   CO2 25 23   * 161*   BUN 20 25*   CREATININE 1.8* 1.8*   CALCIUM 8.1* 8.0*   ANIONGAP 11 13   EGFRNONAA 28.3* 28.3*     Urine Culture:   Recent Labs   Lab 09/28/21 1730 11/28/21 1940 12/20/21  1928 01/20/22  2216 01/21/22  1402   LABURIN ENTEROCOCCUS FAECALIS  > 100,000 cfu/ml  * Multiple organisms isolated. None in predominance.  Repeat if  clinically necessary. KLEBSIELLA PNEUMONIAE ESBL  >100,000 cfu/ml  No other significant isolate  * KLEBSIELLA PNEUMONIAE ESBL  >100,000 cfu/ml  * KLEBSIELLA PNEUMONIAE ESBL  > 100,000 cfu/ml  *     Urine Studies:   Recent Labs   Lab 09/28/21 1730 11/28/21 1940 01/21/22  1402   COLORU Yellow   < > Yellow   APPEARANCEUA Cloudy*   < > Cloudy*   PHUR 6.0   < > 6.0   SPECGRAV 1.020   < > 1.010   PROTEINUA 3+*   < > 2+*   GLUCUA 2+*   < > 1+*   KETONESU Trace*   < > Negative   BILIRUBINUA Negative   < > Negative   OCCULTUA 2+*   < > 2+*   NITRITE  Positive*   < > Negative   LEUKOCYTESUR 3+*   < > 3+*   RBCUA 17*   < > 80*   WBCUA >100*   < > >100*   BACTERIA Occasional   < > Many*   SQUAMEPITHEL 0  --   --    HYALINECASTS 0   < > 1    < > = values in this interval not displayed.     All pertinent labs within the past 24 hours have been reviewed.    Significant Imaging: I have reviewed all pertinent imaging results/findings within the past 24 hours.   X-Ray Chest AP Portable [[id number]] Resulted: 01/21/22 1334   Order Status: Completed Updated: 01/21/22 1336   Narrative:     EXAMINATION:   XR CHEST AP PORTABLE     CLINICAL HISTORY:   chest discomfort;     TECHNIQUE:   Single frontal view of the chest was performed.     COMPARISON:   01/20/2022     FINDINGS:   Again noted is right diaphragmatic elevation with a meniscus sign, likely indicating at least a small volume right pleural effusion.  The left lung field and pleural space remains clear.  The heart size appears normal.  There is mild calcification of the aortic knob consistent with atherosclerotic stigmata.  Tunneled right hemodialysis catheter noted.    Impression:       No interval detrimental change identified.       Electronically signed by: Rosaline Farah   Date: 01/21/2022   Time: 13:34       Imaging History    2022  Date Procedure Name Status Accession Number Location   01/21/22 01:21 PM X-Ray Chest AP Portable Final [id number] [facility]   01/20/22 09:44 PM [id number]   2021  Date Procedure Name Status Accession Number Location   12/23/21 10:33 AM SURG [place] Surgery Fluoro Usage Final [id number] [facility]

## 2022-01-25 NOTE — PLAN OF CARE
NURSING HOME ORDERS    01/25/2022  [facility] - MED SURG  [address] [facility] [address]  Dept: [phone]  Loc: [phone]     Admit to Nursing Home:  Skilled    Diagnoses:  Active Hospital Problems    Diagnosis  POA    *Dialysis patient [Z99.2]  Not Applicable     Chronic    Secondary hypertension [I15.9]  Yes    Debility [R53.81]  Yes     Chronic    A-fib [I48.91]  Yes     Chronic    ZAK (acute kidney injury) [N17.9]  Unknown    Normocytic anemia [D64.9]  Yes     Chronic    S/P left mastectomy [Z90.12]  Not Applicable     Chronic    Requires daily assistance for activities of daily living (ADL) and comfort needs [Z74.1]  Yes     Chronic    Malignant neoplasm of left breast, estrogen receptor positive [C50.912, Z17.0]  Not Applicable     Chronic    Cervicogenic migraine [G43.809]  Yes     Chronic    CKD stage 4 due to type 2 diabetes mellitus [E11.22, N18.4]  Yes     Chronic     [facility]      Chronic pain [G89.29]  Yes     Chronic    Long term prescription opiate use [Z79.891]  Not Applicable     Chronic    Morbid obesity due to excess calories [E66.01]  Yes     Chronic    Suprapubic catheter [Z93.59]  Not Applicable     Chronic    Uncontrolled type 2 diabetes mellitus with stage 4 chronic kidney disease, with long-term current use of insulin [E11.22, E11.65, N18.4, Z79.4]  Not Applicable     Chronic    Major depressive disorder, recurrent episode, moderate [F33.1]  Yes     Chronic    Neurogenic bladder [N31.9]  Yes     Chronic    Recurrent urinary tract infection [N39.0]  Yes     Chronic    Hyponatremia [E87.1]  Yes     Chronic    VIJAYA (obstructive sleep apnea) [G47.33]  Yes     Chronic    Hyperlipidemia associated with type 2 diabetes mellitus [E11.69, E78.5]  Yes     Chronic    Fibromyalgia [M79.7]  Yes     Chronic    Hypothyroidism [E03.9]  Yes     Chronic      Resolved Hospital Problems   No resolved problems to display.       Code  Status: MYKE    Patient is homebound due to:  Dialysis patient    Allergies:Review of patient's allergies indicates:  No Known Allergies    Vitals:  Routine    Diet: cardiac diet, diabetic diet: 1800 calorie and renal diet    Activities:   Activity as tolerated    Labs:  Routine     Nursing Precautions:  Aspiration , Fall and Pressure ulcer prevention    Consults:   PT to evaluate and treat- 3 times a week, OT to evaluate and treat- 3 times a week, Wound Care and Nutrition to evaluate and recommend diet     Miscellaneous Care: Tiwari Care: Empty Tiwari bag every shift.  Change Tiwari every month  Routine Skin for Bedridden Patients:  Apply moisture barrier cream to all  Wound Care: yes:  Right buttocks, right calf area- cleanse with wound cleanser or sterile normal saline, apply cavilon lennox film to the klaus-wound, apply triad ointment to the wound bed then cover/reapply the foam dressing daily/prn cleansing  Diabetes Care: Diabetes: Check blood sugar. Fingerstick blood sugar AC and HS  Sliding Scale/Hypoglycemia Protocol: For FSG<80, give 1 amp D50 or 1 glucose tablet. For FSG , do nothing. For -200, give 1 unit of novolog in addition to pre-meal insulin. For -250, give 2 units of novolog in addition to pre-meal insulin. For -300, give 3 units of novolog in addition to pre-meal insulin. For 301-350, give 4 units of novolog in addition to pre-meal insulin. For 351-400, give 5 units of novolog in addition to pre-meal insulin. For FSG >400, give 5 units of novolog in addition to pre-meal insulin and please call MD                   Diabetes Care:  SN to perform and educate Diabetic management with blood glucose monitoring: and Report CBG < 60 or > 350 to physician.      Medications: Discontinue all previous medication orders, if any. See new list below.     Medication List      START taking these medications    lisinopriL 10 MG tablet  Take 1 tablet (10 mg total) by mouth once daily.    "  sulfamethoxazole-trimethoprim 800-160mg 800-160 mg Tab  Commonly known as: BACTRIM DS  Take 1 tablet by mouth 2 (two) times daily. for 3 days  Start taking on: January 26, 2022        CHANGE how you take these medications    butalbital-acetaminophen-caffeine -40 mg -40 mg per tablet  Commonly known as: FIORICET, ESGIC  TAKE 1 TABLET BY MOUTH WHEN NOT CONTROLLED BY OTHER MEDS. NO MORE THAN 10 TABS PER 30 DAYS  What changed: See the new instructions.     magnesium oxide 400 mg (241.3 mg magnesium) tablet  Commonly known as: MAG-OX  TAKE 1 TABLET (400 MG TOTAL) BY MOUTH 2 (TWO) TIMES DAILY.  What changed: See the new instructions.     SYNTHROID 50 MCG tablet  Generic drug: levothyroxine  TAKE 1 TABLET BY MOUTH EVERY DAY  What changed:   · how much to take  · when to take this  · additional instructions        CONTINUE taking these medications    albuterol 90 mcg/actuation inhaler  Commonly known as: PROVENTIL HFA  Inhale 2 puffs into the lungs every 6 (six) hours as needed for Wheezing or Shortness of Breath. Rescue     anastrozole 1 mg Tab  Commonly known as: ARIMIDEX  TAKE 1 TABLET BY MOUTH EVERY DAY     apixaban 2.5 mg Tab  Commonly known as: ELIQUIS  Take 1 tablet (2.5 mg total) by mouth 2 (two) times daily.     atorvastatin 80 MG tablet  Commonly known as: LIPITOR  Take 1 tablet (80 mg total) by mouth once daily.     BD INSULIN SYRINGE ULTRA-FINE 0.5 mL 31 gauge x 5/16" Syrg  Generic drug: insulin syringe-needle U-100  USE WITH INSULIN 4 TIMES A DAY     * blood sugar diagnostic Strp  ONE TOUCH ULTRA Check blood sugars 1-2 x a day     * blood sugar diagnostic Strp  To check BG 4  times daily, to use with insurance preferred meter     blood-glucose meter kit  To check BG 4 times daily, to use with insurance preferred meter     cloNIDine 0.2 MG tablet  Commonly known as: CATAPRES  Take 1 tablet (0.2 mg total) by mouth 3 (three) times daily.     ergocalciferol 50,000 unit Cap  Commonly known as: " "ERGOCALCIFEROL  TAKE 1 CAPSULE (50,000 UNITS TOTAL) BY MOUTH EVERY 7 DAYS. TAKE ONE CAPSULE BY MOUTH ONE TIME PER WEEK, TAKES ON TUESDAYS     famotidine 20 MG tablet  Commonly known as: PEPCID  Take 1 tablet (20 mg total) by mouth once daily.     furosemide 80 MG tablet  Commonly known as: LASIX  Take 2 tablets (160 mg total) by mouth 2 (two) times daily.     gemfibroziL 600 MG tablet  Commonly known as: LOPID  Take 1 tablet (600 mg total) by mouth every Mon, Wed, Fri.     * lancets Misc  One touch ultra, checks 1-2 x a day     * lancets Misc  To check BG 4 times daily, to use with insurance preferred meter     melatonin 3 mg tablet  Commonly known as: MELATIN  Take 2 tablets (6 mg total) by mouth nightly as needed for Insomnia.     methocarbamoL 750 MG Tab  Commonly known as: ROBAXIN  TAKE 1-2 TABLETS (750-1,500 MG TOTAL) BY MOUTH 3 (THREE) TIMES DAILY AS NEEDED.     metoprolol tartrate 25 MG tablet  Commonly known as: LOPRESSOR  Take 1 tablet (25 mg total) by mouth 2 (two) times daily.     oxybutynin 10 MG 24 hr tablet  Commonly known as: DITROPAN-XL  TAKE 1 TABLET BY MOUTH EVERY DAY     pen needle, diabetic 31 gauge x 5/16" Ndle  Commonly known as: BD ULTRA-FINE SHORT PEN NEEDLE  USE WITH LANTUS DAILY, e 11.65     polyethylene glycol 17 gram Pwpk  Commonly known as: GLYCOLAX  One packet QD prn constipation     sevelamer carbonate 800 mg Tab  Commonly known as: RENVELA  Take 1 tablet (800 mg total) by mouth 3 (three) times daily with meals.     sumatriptan 100 MG tablet  Commonly known as: IMITREX  TAKE 1 TABLET (100 MG TOTAL) BY MOUTH 2 (TWO) TIMES DAILY AS NEEDED FOR MIGRAINE.     traZODone 100 MG tablet  Commonly known as: DESYREL  Take 1 tablet (100 mg total) by mouth nightly as needed for Insomnia.         * This list has 4 medication(s) that are the same as other medications prescribed for you. Read the directions carefully, and ask your doctor or other care provider to review them with you.          "         Immunizations Administered as of 1/25/2022     Name Date Dose VIS Date Route Exp Date    COVID-19, MRNA, LN-S, PF (Moderna) 3/25/2021  8:20 AM 0.5 mL 12/19/2020 Intramuscular 8/29/2021    Site: Right deltoid     Given By: Desiree Perdomo RN     : Modern[employer]     Lot: [id number]     COVID-19, MRNA, LN-S, PF (Moderna) 2/25/2021  8:05 AM 0.5 mL 12/19/2020 Intramuscular 8/17/2021    Site: Right deltoid     Given By: Liana Lopez RN     : Moderna [employer]     Lot: [id number]     COVID-19, MRNA, LN-S, PF (Pfizer) (Purple Cap) 12/24/2021  4:01 PM 0.3 mL 12/12/2020 Intramuscular 12/24/2021    Site: Right deltoid     Given By: Molly Carmen RN     : Pf[employer] Inc     Lot: [id number]           This patient has had both covid vaccinations    Some patients may experience side effects after vaccination.  These may include fever, headache, muscle or joint aches.  Most symptoms resolve with 24-48 hours and do not require urgent medical evaluation unless they persist for more than 72 hours or symptoms are concerning for an unrelated medical condition.          _________________________________  Veronica ZhaoGlendo Name: MD Maegan  01/25/2022

## 2022-01-25 NOTE — SUBJECTIVE & OBJECTIVE
Interval History:   No AEON.  Afebrile and WBC WNL  UCx with ESBL Klep Pneumo - intermediate to ertapenem - Merrem and Bactrim sensitive only  Repeat urine culture post change with GNR - likely thsame  The patient denies any recent fever, chills, or sweats.      Review of Systems   Constitutional: Negative for activity change, chills, diaphoresis and fever.   Respiratory: Negative for cough, shortness of breath and wheezing.    Cardiovascular: Negative for chest pain.   Gastrointestinal: Negative for abdominal pain, constipation, diarrhea, nausea and vomiting.   Genitourinary: Negative for dysuria, frequency and urgency.   Neurological: Negative for dizziness.   Hematological: Does not bruise/bleed easily.     Objective:     Vital Signs (Most Recent):  Temp: 99 °F (37.2 °C) (01/24/22 1613)  Pulse: 76 (01/24/22 1613)  Resp: 18 (01/24/22 1613)  BP: 126/65 (01/24/22 1613)  SpO2: 96 % (01/24/22 1613) Vital Signs (24h Range):  Temp:  [98.1 °F (36.7 °C)-99.1 °F (37.3 °C)] 99 °F (37.2 °C)  Pulse:  [70-87] 76  Resp:  [17-18] 18  SpO2:  [93 %-96 %] 96 %  BP: (126-174)/(65-74) 126/65     Weight: 120.7 kg (265 lb 15.8 oz)  Body mass index is 47.12 kg/m².    Estimated Creatinine Clearance: 37.1 mL/min (A) (based on SCr of 1.8 mg/dL (H)).    Physical Exam  Constitutional:       General: She is not in acute distress.     Appearance: She is well-developed. She is obese. She is not ill-appearing, toxic-appearing or diaphoretic.   HENT:      Head: Normocephalic and atraumatic.   Cardiovascular:      Rate and Rhythm: Normal rate and regular rhythm.      Heart sounds: Normal heart sounds. No murmur heard.  No friction rub. No gallop.    Pulmonary:      Effort: Pulmonary effort is normal. No respiratory distress.      Breath sounds: Normal breath sounds. No wheezing or rales.   Abdominal:      General: Bowel sounds are normal. There is no distension.      Palpations: Abdomen is soft. There is no mass.      Tenderness: There is no  abdominal tenderness. There is no guarding or rebound.   Skin:     General: Skin is warm and dry.   Neurological:      Mental Status: She is alert and oriented to person, place, and time.   Psychiatric:         Behavior: Behavior normal.     Pics 1/22              Significant Labs:   Blood Culture:   Recent Labs   Lab 09/29/21 2206 11/28/21 2121 11/28/21 2122 12/06/21  0758 12/06/21  0802   LABBLOO No growth after 5 days. Gram stain aer bottle: Gram positive cocci in clusters resembling Staph   Gram stain zelda bottle: Gram negative rods   Results called to and read back by: Love Montemayor, Charge Nurse   11/29/2021  20:32  COAGULASE-NEGATIVE STAPHYLOCOCCUS SPECIES  Organism is a probable contaminant  *  CLOSTRIDIUM SPOROGENES* No growth after 5 days. No growth after 5 days. No growth after 5 days.     CBC:   Recent Labs   Lab 01/23/22 0332 01/24/22  0339   WBC 5.35 5.45   HGB 7.8* 8.0*   HCT 24.8* 26.2*    237     CMP:   Recent Labs   Lab 01/23/22 0332 01/24/22  0339   * 132*   K 3.4* 3.3*   CL 99 96   CO2 25 25   * 158*   BUN 16 20   CREATININE 1.3 1.8*   CALCIUM 8.0* 8.1*   ANIONGAP 10 11   EGFRNONAA 42.0* 28.3*     Urine Culture:   Recent Labs   Lab 09/28/21 1730 11/28/21 1940 12/20/21 1928 01/20/22  2216 01/21/22  1402   LABURIN ENTEROCOCCUS FAECALIS  > 100,000 cfu/ml  * Multiple organisms isolated. None in predominance.  Repeat if  clinically necessary. KLEBSIELLA PNEUMONIAE ESBL  >100,000 cfu/ml  No other significant isolate  * KLEBSIELLA PNEUMONIAE ESBL  >100,000 cfu/ml  * GRAM NEGATIVE FERNANDEZ  > 100,000 cfu/ml  Identification and susceptibility pending  *     Urine Studies:   Recent Labs   Lab 09/28/21 1730 11/28/21 1940 01/21/22  1402   COLORU Yellow   < > Yellow   APPEARANCEUA Cloudy*   < > Cloudy*   PHUR 6.0   < > 6.0   SPECGRAV 1.020   < > 1.010   PROTEINUA 3+*   < > 2+*   GLUCUA 2+*   < > 1+*   KETONESU Trace*   < > Negative   BILIRUBINUA Negative   < > Negative   OCCULTUA  2+*   < > 2+*   NITRITE Positive*   < > Negative   LEUKOCYTESUR 3+*   < > 3+*   RBCUA 17*   < > 80*   WBCUA >100*   < > >100*   BACTERIA Occasional   < > Many*   SQUAMEPITHEL 0  --   --    HYALINECASTS 0   < > 1    < > = values in this interval not displayed.     All pertinent labs within the past 24 hours have been reviewed.    Significant Imaging: I have reviewed all pertinent imaging results/findings within the past 24 hours.   X-Ray Chest AP Portable [[id number]] Resulted: 01/21/22 1334   Order Status: Completed Updated: 01/21/22 1336   Narrative:     EXAMINATION:   XR CHEST AP PORTABLE     CLINICAL HISTORY:   chest discomfort;     TECHNIQUE:   Single frontal view of the chest was performed.     COMPARISON:   01/20/2022     FINDINGS:   Again noted is right diaphragmatic elevation with a meniscus sign, likely indicating at least a small volume right pleural effusion.  The left lung field and pleural space remains clear.  The heart size appears normal.  There is mild calcification of the aortic knob consistent with atherosclerotic stigmata.  Tunneled right hemodialysis catheter noted.    Impression:       No interval detrimental change identified.       Electronically signed by: Rosaline Farah   Date: 01/21/2022   Time: 13:34       Imaging History    2022  Date Procedure Name Status Accession Number Location   01/21/22 01:21 PM X-Ray Chest AP Portable Final [id number] [facility]   01/20/22 09:44 PM X-Ray Chest AP Portable Final [id number] [facility]   2021  Date Procedure Name Status Accession Number Location   12/23/21 10:33 AM SURG [place] Surgery Fluoro Usage Final [id number] [facility]

## 2022-01-25 NOTE — ASSESSMENT & PLAN NOTE
70 yo female with Hx MDRO UTI, L renal stones and stent with SP catheter secondary to neurogenic bladder and obstruction admitted for HD with cf UTI as urine appeared purulent.  - UA x 2 positive but patient without SP pain or systemic sign of infection  - prior UCX last admit in Dec with ESBL Kleb pneumo - ertapenem and cipro sensitive  - UCX now showing ESBL Kleb Pneumo (intermediate to ertapenem) - meropenem sensitive and repeat UCX post SP change with GNR   - concern for infection given amount of RBCs on UA  - Stable non septic    Plan:  1. Continue Meropenem  2. FU UCX results from 1/21 with GNR - need to know sensitivities prior to DC  3. Contact isolation given prior ESBL  4. Please exchange murillo as likely colonized, be a retained nidus for infection and re seed urine once abx stopped.   5. Plan on 3d of Meropenem then DC on Bactrim DS qod x 3 doses  6. Will follow  7. Discussed with ID staff

## 2022-01-25 NOTE — PLAN OF CARE
[facility]  Initial Discharge Assessment       Primary Care Provider: Kailey Navarro MD    Admission Diagnosis: ESRD (end stage renal disease) [N18.6]  Dialysis patient [Z99.2]  Chest pain [R07.9]  Urinary tract infection without hematuria, site unspecified [N39.0]    Admission Date: 1/21/2022  Expected Discharge Date: 1/26/2022    Discharge Barriers Identified: Transportation    Payor: [employer]ICARE / Plan: [employer] MEDICARE HMO / Product Type: Capitation /     Extended Emergency Contact Information  Primary Emergency Contact: Kat Sánchez  Address: [address] [place] [address]           [place] [address] [facility]  Home Phone: [phone]  Mobile Phone: [phone]  Relation: Sister    Discharge Plan A: (P) Skilled Nursing Facility  Discharge Plan B: (P) Skilled Nursing Facility      [id number] [place] - [address] [place] [address]  [address] [place] [address]  [place] [address]  Phone: [phone] Fax: [phone]    [id number] - [place] - [address] [place] [address] AT [facility] [place] [address]  [address] [place] [address]  Phone: [phone] Fax: [phone]      Initial Assessment (most recent)     Adult Discharge Assessment - 01/25/22 1455        Discharge Assessment    Assessment Type Discharge Planning Assessment     Confirmed/corrected address, phone number and insurance Yes     Source of Information patient     Communicated FLORENTINO with patient/caregiver Yes     Reason For Admission ESRD     Lives With sibling(s)   Patient lives with her sister.    Do you expect to return to your current living situation? Yes     Do you have help at home or someone to help you manage your care at home? Yes     Who are your caregiver(s) and their phone number(s)? Kat Sánchez (sister) [phone]     Prior to hospitilization cognitive status: Alert/Oriented     Current cognitive status: Alert/Oriented     Walking or Climbing Stairs Difficulty  ambulation difficulty, requires equipment     Dressing/Bathing Difficulty bathing difficulty, assistance 1 person     Equipment Currently Used at Home wheelchair;power chair;lift device     Readmission within 30 days? Yes   Patient was discharged from [facility] on 12/30.    Patient currently being followed by outpatient case management? No     Do you currently have service(s) that help you manage your care at home? Yes     Name and Contact number of agency [facility]     Is the pt/caregiver preference to resume services with current agency Yes     Do you take prescription medications? Yes     Do you have prescription coverage? Yes     Do you have any problems affording any of your prescribed medications? No     Is the patient taking medications as prescribed? yes     How do you get to doctors appointments? health plan transportation     Are you on dialysis? Yes     Dialysis Name and Scheduled days [facility]/F     Do you take coumadin? No     Discharge Plan A Skilled Nursing Facility     Discharge Plan B Home Health     DME Needed Upon Discharge  other (see comments)   TBD    Discharge Plan discussed with: Patient     Discharge Barriers Identified Transportation        Relationship/Environment    Name(s) of Who Lives With Patient Kat Sánchez (sister) [phone]                  Marisol Tate RN  Ext [phone]

## 2022-01-26 PROBLEM — L89.313 PRESSURE INJURY OF RIGHT BUTTOCK, STAGE 3: Status: ACTIVE | Noted: 2022-01-26

## 2022-01-26 LAB
ANION GAP SERPL CALC-SCNC: 9 MMOL/L (ref 8–16)
BACTERIA UR CULT: ABNORMAL
BASOPHILS # BLD AUTO: 0.03 K/UL (ref 0–0.2)
BASOPHILS NFR BLD: 0.5 % (ref 0–1.9)
BUN SERPL-MCNC: 17 MG/DL (ref 8–23)
CALCIUM SERPL-MCNC: 8.2 MG/DL (ref 8.7–10.5)
CHLORIDE SERPL-SCNC: 97 MMOL/L (ref 95–110)
CO2 SERPL-SCNC: 28 MMOL/L (ref 23–29)
CREAT SERPL-MCNC: 1.7 MG/DL (ref 0.5–1.4)
DIFFERENTIAL METHOD: ABNORMAL
EOSINOPHIL # BLD AUTO: 0.3 K/UL (ref 0–0.5)
EOSINOPHIL NFR BLD: 5.4 % (ref 0–8)
ERYTHROCYTE [DISTWIDTH] IN BLOOD BY AUTOMATED COUNT: 14.9 % (ref 11.5–14.5)
EST. GFR  (AFRICAN AMERICAN): 35 ML/MIN/1.73 M^2
EST. GFR  (NON AFRICAN AMERICAN): 30.3 ML/MIN/1.73 M^2
GLUCOSE SERPL-MCNC: 160 MG/DL (ref 70–110)
HCT VFR BLD AUTO: 26.8 % (ref 37–48.5)
HGB BLD-MCNC: 8.3 G/DL (ref 12–16)
IMM GRANULOCYTES # BLD AUTO: 0.16 K/UL (ref 0–0.04)
IMM GRANULOCYTES NFR BLD AUTO: 2.9 % (ref 0–0.5)
LYMPHOCYTES # BLD AUTO: 2.1 K/UL (ref 1–4.8)
LYMPHOCYTES NFR BLD: 37.9 % (ref 18–48)
MAGNESIUM SERPL-MCNC: 1.7 MG/DL (ref 1.6–2.6)
MCH RBC QN AUTO: 28.9 PG (ref 27–31)
MCHC RBC AUTO-ENTMCNC: 31 G/DL (ref 32–36)
MCV RBC AUTO: 93 FL (ref 82–98)
MONOCYTES # BLD AUTO: 0.8 K/UL (ref 0.3–1)
MONOCYTES NFR BLD: 13.7 % (ref 4–15)
NEUTROPHILS # BLD AUTO: 2.2 K/UL (ref 1.8–7.7)
NEUTROPHILS NFR BLD: 39.6 % (ref 38–73)
NRBC BLD-RTO: 0 /100 WBC
PHOSPHATE SERPL-MCNC: 4 MG/DL (ref 2.7–4.5)
PLATELET # BLD AUTO: 232 K/UL (ref 150–450)
PMV BLD AUTO: 9.2 FL (ref 9.2–12.9)
POCT GLUCOSE: 126 MG/DL (ref 70–110)
POCT GLUCOSE: 195 MG/DL (ref 70–110)
POCT GLUCOSE: 273 MG/DL (ref 70–110)
POCT GLUCOSE: 306 MG/DL (ref 70–110)
POTASSIUM SERPL-SCNC: 4 MMOL/L (ref 3.5–5.1)
RBC # BLD AUTO: 2.87 M/UL (ref 4–5.4)
SODIUM SERPL-SCNC: 134 MMOL/L (ref 136–145)
WBC # BLD AUTO: 5.56 K/UL (ref 3.9–12.7)

## 2022-01-26 PROCEDURE — 80048 BASIC METABOLIC PNL TOTAL CA: CPT | Mod: HCNC

## 2022-01-26 PROCEDURE — 84100 ASSAY OF PHOSPHORUS: CPT | Mod: HCNC

## 2022-01-26 PROCEDURE — 25000003 PHARM REV CODE 250: Mod: HCNC | Performed by: STUDENT IN AN ORGANIZED HEALTH CARE EDUCATION/TRAINING PROGRAM

## 2022-01-26 PROCEDURE — 27000207 HC ISOLATION: Mod: HCNC

## 2022-01-26 PROCEDURE — 63600175 PHARM REV CODE 636 W HCPCS: Mod: HCNC | Performed by: PHYSICIAN ASSISTANT

## 2022-01-26 PROCEDURE — 97530 THERAPEUTIC ACTIVITIES: CPT | Mod: HCNC,CO

## 2022-01-26 PROCEDURE — 11000001 HC ACUTE MED/SURG PRIVATE ROOM: Mod: HCNC

## 2022-01-26 PROCEDURE — 36415 COLL VENOUS BLD VENIPUNCTURE: CPT | Mod: HCNC

## 2022-01-26 PROCEDURE — 97530 THERAPEUTIC ACTIVITIES: CPT | Mod: HCNC,CQ

## 2022-01-26 PROCEDURE — 83735 ASSAY OF MAGNESIUM: CPT | Mod: HCNC

## 2022-01-26 PROCEDURE — 85025 COMPLETE CBC W/AUTO DIFF WBC: CPT | Mod: HCNC

## 2022-01-26 PROCEDURE — 97535 SELF CARE MNGMENT TRAINING: CPT | Mod: HCNC,CO

## 2022-01-26 PROCEDURE — 25000003 PHARM REV CODE 250: Mod: HCNC

## 2022-01-26 RX ADMIN — METOPROLOL TARTRATE 25 MG: 25 TABLET, FILM COATED ORAL at 09:01

## 2022-01-26 RX ADMIN — SENNOSIDES AND DOCUSATE SODIUM 1 TABLET: 50; 8.6 TABLET ORAL at 08:01

## 2022-01-26 RX ADMIN — MUPIROCIN: 20 OINTMENT TOPICAL at 09:01

## 2022-01-26 RX ADMIN — CLONIDINE HYDROCHLORIDE 0.2 MG: 0.2 TABLET ORAL at 08:01

## 2022-01-26 RX ADMIN — METHOCARBAMOL 750 MG: 750 TABLET ORAL at 08:01

## 2022-01-26 RX ADMIN — SENNOSIDES AND DOCUSATE SODIUM 1 TABLET: 50; 8.6 TABLET ORAL at 09:01

## 2022-01-26 RX ADMIN — OXYCODONE HYDROCHLORIDE 10 MG: 10 TABLET ORAL at 10:01

## 2022-01-26 RX ADMIN — Medication 6 MG: at 08:01

## 2022-01-26 RX ADMIN — LISINOPRIL 10 MG: 10 TABLET ORAL at 09:01

## 2022-01-26 RX ADMIN — OXYCODONE HYDROCHLORIDE 10 MG: 10 TABLET ORAL at 02:01

## 2022-01-26 RX ADMIN — GEMFIBROZIL 600 MG: 600 TABLET ORAL at 04:01

## 2022-01-26 RX ADMIN — INSULIN ASPART 3 UNITS: 100 INJECTION, SOLUTION INTRAVENOUS; SUBCUTANEOUS at 12:01

## 2022-01-26 RX ADMIN — MUPIROCIN: 20 OINTMENT TOPICAL at 12:01

## 2022-01-26 RX ADMIN — FUROSEMIDE 160 MG: 80 TABLET ORAL at 08:01

## 2022-01-26 RX ADMIN — OXYBUTYNIN CHLORIDE 10 MG: 10 TABLET, FILM COATED, EXTENDED RELEASE ORAL at 09:01

## 2022-01-26 RX ADMIN — METHOCARBAMOL 750 MG: 750 TABLET ORAL at 04:01

## 2022-01-26 RX ADMIN — FAMOTIDINE 20 MG: 20 TABLET ORAL at 09:01

## 2022-01-26 RX ADMIN — ATORVASTATIN CALCIUM 80 MG: 20 TABLET, FILM COATED ORAL at 09:01

## 2022-01-26 RX ADMIN — APIXABAN 2.5 MG: 2.5 TABLET, FILM COATED ORAL at 08:01

## 2022-01-26 RX ADMIN — LEVOTHYROXINE SODIUM 50 MCG: 50 TABLET ORAL at 05:01

## 2022-01-26 RX ADMIN — POLYETHYLENE GLYCOL 3350 17 G: 17 POWDER, FOR SOLUTION ORAL at 09:01

## 2022-01-26 RX ADMIN — ANASTROZOLE 1 MG: 1 TABLET, COATED ORAL at 09:01

## 2022-01-26 RX ADMIN — CLONIDINE HYDROCHLORIDE 0.2 MG: 0.2 TABLET ORAL at 02:01

## 2022-01-26 RX ADMIN — CLONIDINE HYDROCHLORIDE 0.2 MG: 0.2 TABLET ORAL at 09:01

## 2022-01-26 RX ADMIN — FUROSEMIDE 160 MG: 80 TABLET ORAL at 09:01

## 2022-01-26 RX ADMIN — APIXABAN 2.5 MG: 2.5 TABLET, FILM COATED ORAL at 09:01

## 2022-01-26 RX ADMIN — INSULIN ASPART 2 UNITS: 100 INJECTION, SOLUTION INTRAVENOUS; SUBCUTANEOUS at 09:01

## 2022-01-26 RX ADMIN — MEROPENEM AND SODIUM CHLORIDE 1 G: 1 INJECTION, SOLUTION INTRAVENOUS at 10:01

## 2022-01-26 RX ADMIN — VENLAFAXINE HYDROCHLORIDE 150 MG: 75 CAPSULE, EXTENDED RELEASE ORAL at 09:01

## 2022-01-26 NOTE — CONSULTS
[facility] - Med Surg  Skin [facility]ity [facility]  Consult Note    Patient Name: Cecile Bowen  MRN: [id number]  Admission Date: 1/21/2022  Hospital Length of Stay: 1 days  Attending Physician: Liana Ruiz MD  Primary Care Provider: Kailey Navarro MD     Consults  Subjective:     History of Present Illness:  Cecile Bowen maikel 71F with ESRD (on HD), DM2, HTN, breast cancer s/p mastectomy, and recurrent UTI presents with chest heaviness. Pt recently seen in ED for similar chest heaviness. She reports a productive cough and missing some medications. She has a chronic suprapubic catheter of 5 years and is bed bound at baseline, but has a wheelchair for mobility but rarely uses it. Wound care is consulted for evaluation of buttocks skin injuries.      Scheduled Meds:   sodium chloride 0.9%   Intravenous Once    anastrozole  1 mg Oral Daily    apixaban  2.5 mg Oral BID    atorvastatin  80 mg Oral Daily    cloNIDine  0.2 mg Oral TID    epoetin chloe-ebpx (RETACRIT) injection  50 Units/kg Intravenous Every Tues, Thurs, Sat    ergocalciferol  50,000 Units Oral Q7 Days    famotidine  20 mg Oral Daily    furosemide  160 mg Oral BID    gemfibroziL  600 mg Oral Every Mon, Wed, Fri    levothyroxine  50 mcg Oral Before breakfast    lisinopriL  10 mg Oral Daily    meropenem (MERREM) IVPB  1 g Intravenous Q24H    metoprolol tartrate  25 mg Oral BID    mupirocin   Nasal BID    oxybutynin  10 mg Oral Daily    polyethylene glycol  17 g Oral BID    senna-docusate 8.6-50 mg  1 tablet Oral BID    venlafaxine  150 mg Oral Daily     Continuous Infusions:  PRN Meds:acetaminophen, butalbital-acetaminophen-caffeine -40 mg, dextrose 50%, dextrose 50%, glucagon (human recombinant), glucose, glucose, heparin (porcine), influenza, insulin aspart U-100, melatonin, methocarbamoL, ondansetron, oxyCODONE, sodium chloride 0.9%, sodium chloride 0.9%, sumatriptan    Review of patient's allergies indicates:  No Known  Allergies     Past Medical History:   Diagnosis Date    Cervicogenic migraine     Chronic pain     CKD (chronic kidney disease) stage 4, GFR 15-29 ml/min     Dr Piedadmoody    CKD (chronic kidney disease) stage 4, GFR 15-29 ml/min     Diabetes mellitus     Long term use of Insulin, Diabetic Neuropathy    Fibromyalgia     Hydronephrosis     Hyperlipidemia     Hypertension 12/12/2012    Hypothyroidism 12/12/2012    ARON (iron deficiency anemia)     Insomnia     Levoscoliosis     Malignant neoplasm of upper-outer quadrant of left breast in female, estrogen receptor positive     Metabolic acidosis     Mobility impaired     Nuclear sclerosis - Both Eyes 3/24/2014    VIJAYA (obstructive sleep apnea)     Osteopenia     Pulmonary nodule     Recurrent UTI     Renal manifestation of secondary diabetes mellitus     Secondary hyperparathyroidism, renal     Urinary retention      Past Surgical History:   Procedure Laterality Date    BREAST BIOPSY Right     benign    CHOLECYSTECTOMY      COLONOSCOPY N/A 1/13/2017    Procedure: COLONOSCOPY;  Surgeon: Morris Wiseman MD;  Location: [facility] (4TH FLR);  Service: Endoscopy;  Laterality: N/A;  Renal pt Nephrology advised to avoid phosphate preps    CYSTOSCOPY N/A 10/8/2018    Procedure: CYSTOSCOPY;  Surgeon: Ronel Whittington MD;  [address]  Service: Urology;  Laterality: N/A;  45 min    CYSTOSCOPY N/A 3/25/2019    Procedure: CYSTOSCOPY;  Surgeon: Ronle Whittington MD;  Location: [address];  Service: Urology;  Laterality: N/A;  45 min    CYSTOSCOPY N/A 8/26/2019    Procedure: CYSTOSCOPY;  Surgeon: Ronel Whittington MD;  Location: [address];  Service: Urology;  Laterality: N/A;  45 min    CYSTOSCOPY N/A 7/2/2021    Procedure: CYSTOSCOPY;  Surgeon: Ronel Whittington MD;  Location: [address];  Service: Urology;  Laterality: N/A;    CYSTOSCOPY  12/23/2021    Procedure: CYSTOSCOPY;  Surgeon: Ronel FRAZIER  MD Pk;  Location: [facility] OR [address] [place] [address];  Service: Urology;;    CYSTOSCOPY W/ URETERAL STENT PLACEMENT Left 5/15/2021    Procedure: CYSTOSCOPY, WITH URETERAL STENT INSERTION;  Surgeon: Levy Sánchez Jr., MD;  Location: [address];  Service: Urology;  Laterality: Left;    CYSTOSCOPY WITH BIOPSY OF BLADDER N/A 1/27/2020    Procedure: CYSTOSCOPY, WITH BLADDER BIOPSY, WITH FULGURATION IF INDICATED;  Surgeon: Ronel Whittington MD;  Location: [address];  Service: Urology;  Laterality: N/A;    DILATION AND CURETTAGE OF UTERUS      GALLBLADDER SURGERY  2006    HYSTERECTOMY      INJECTION FOR SENTINEL NODE IDENTIFICATION Left 8/1/2019    Procedure: INJECTION, FOR SENTINEL NODE IDENTIFICATION;  Surgeon: Samia Fulton MD;  Location: [address];  Service: General;  Laterality: Left;    INJECTION OF BOTULINUM TOXIN TYPE A N/A 10/8/2018    Procedure: INJECTION, BOTULINUM TOXIN, TYPE A 300 UNITS;  Surgeon: Ronel Whittington MD;  Location: [address];  Service: Urology;  Laterality: N/A;    INJECTION OF BOTULINUM TOXIN TYPE A N/A 3/25/2019    Procedure: INJECTION, BOTULINUM TOXIN, TYPE A 300 UNITS;  Surgeon: Ronel Whittington MD;  Location: [address];  Service: Urology;  Laterality: N/A;    INJECTION OF BOTULINUM TOXIN TYPE A N/A 8/26/2019    Procedure: INJECTION, BOTULINUM TOXIN, TYPE A 300 UNITS;  Surgeon: Ronel Whittington MD;  Location: [address];  Service: Urology;  Laterality: N/A;    MASTECTOMY Left 8/1/2019    Procedure: MASTECTOMY 23 hour stay;  Surgeon: Samia Fulton MD;  Location: [facility] OR [address] [place] [address];  Service: General;  Laterality: Left;    OVARIAN CYST SURGERY  1985    REPLACEMENT OF STENT Left 12/23/2021    Procedure: REPLACEMENT, STENT;  Surgeon: Ronel Whittington MD;  Location: [address];  Service: Urology;  Laterality: Left;    SENTINEL LYMPH NODE BIOPSY Left 8/1/2019    Procedure: BIOPSY, LYMPH NODE, SENTINEL;  Surgeon: Samia Fulton  MD;  Location: [facility] OR [address] [place] [address];  Service: General;  Laterality: Left;    spt placement      TONSILLECTOMY, ADENOIDECTOMY      TOTAL ABDOMINAL HYSTERECTOMY W/ BILATERAL SALPINGOOPHORECTOMY  1985       Family History     Problem Relation (Age of Onset)    ALS Mother    Cancer Maternal Grandmother, Paternal Grandfather, Brother    Diabetes Sister, Maternal Aunt, Maternal Uncle    Kidney disease Sister, Maternal Aunt    Prostate cancer Brother    Scoliosis Brother        Tobacco Use    Smoking status: Never Smoker    Smokeless tobacco: Never Used   Substance and Sexual Activity    Alcohol use: No     Alcohol/week: 0.0 standard drinks    Drug use: No    Sexual activity: Not Currently     Birth control/protection: Abstinence     Review of Systems   Skin: Positive for wound.       Objective:     Vital Signs (Most Recent):  Temp: 98.8 °F (37.1 °C) (01/26/22 1113)  Pulse: 79 (01/26/22 1113)  Resp: 18 (01/26/22 1414)  BP: 137/61 (01/26/22 1113)  SpO2: 96 % (01/26/22 1113) Vital Signs (24h Range):  Temp:  [96.7 °F (35.9 °C)-98.8 °F (37.1 °C)] 98.8 °F (37.1 °C)  Pulse:  [72-84] 79  Resp:  [16-18] 18  SpO2:  [91 %-96 %] 96 %  BP: (136-175)/(61-81) 137/61     Weight: 120.7 kg (265 lb 15.8 oz)  Body mass index is 47.12 kg/m².  Physical Exam  Constitutional:       Appearance: Normal appearance.   Skin:     General: Skin is warm and dry.      Findings: Lesion present.   Neurological:      Mental Status: She is alert.         Laboratory:  All pertinent labs reviewed within the last 24 hours.    Diagnostic Results:  None      Assessment/Plan:         [facility] Skin Integrity Evaluation    Skin Integrity [facility] evaluation of patient as part of the comprehensive skin care team.   She has been admitted for 5 days. Skin injury was noted on 11/30/21. POA yes. PT and RD are involved in her care.                Pressure injury of right buttock, stage 3  - POA pressure injury stage 3 noted to buttocks.  - continue Triad bid. Applied per NP.  -  pt can turn independently in bed. Encouraged to turn q2h.  - MAINE surface with waffle overlay in place.  - Nursing to maintain pressure injury prevention measures.         Thank you for your consult. I will follow-up with patient. Please contact us if you have any additional questions.         Anni Nelson NP  Skin [facility]ity [facility]  [facility] - Med Surg

## 2022-01-26 NOTE — SUBJECTIVE & OBJECTIVE
Interval History: no acute events overnight. No complaints besides feeling tired. Up with PT. Awaiting SNF placement.     Review of Systems   Constitutional: Positive for fatigue. Negative for chills and fever.   Respiratory: Negative for cough and shortness of breath.    Cardiovascular: Negative for chest pain.   Gastrointestinal: Negative for abdominal pain.   Neurological: Positive for weakness.     Objective:     Vital Signs (Most Recent):  Temp: 97.9 °F (36.6 °C) (01/26/22 1600)  Pulse: 79 (01/26/22 1600)  Resp: 16 (01/26/22 1600)  BP: (!) 130/56 (01/26/22 1600)  SpO2: (!) 94 % (01/26/22 1600) Vital Signs (24h Range):  Temp:  [96.7 °F (35.9 °C)-98.8 °F (37.1 °C)] 97.9 °F (36.6 °C)  Pulse:  [72-84] 79  Resp:  [16-18] 16  SpO2:  [91 %-96 %] 94 %  BP: (130-175)/(56-81) 130/56     Weight: 120.7 kg (265 lb 15.8 oz)  Body mass index is 47.12 kg/m².    Intake/Output Summary (Last 24 hours) at 1/26/2022 1752  Last data filed at 1/26/2022 1100  Gross per 24 hour   Intake 480 ml   Output 1500 ml   Net -1020 ml      Physical Exam  Vitals and nursing note reviewed.   Constitutional:       General: She is not in acute distress.     Appearance: She is obese. She is ill-appearing.   Cardiovascular:      Rate and Rhythm: Normal rate.   Pulmonary:      Effort: Pulmonary effort is normal. No respiratory distress.   Neurological:      Mental Status: She is alert and oriented to person, place, and time.      Motor: Weakness present.   Psychiatric:         Behavior: Behavior normal.         Thought Content: Thought content normal.         Significant Labs: All pertinent labs within the past 24 hours have been reviewed.    Significant Imaging: I have reviewed all pertinent imaging results/findings within the past 24 hours.

## 2022-01-26 NOTE — PLAN OF CARE
01/26/22 1452   Post-Acute Status   Post-Acute Authorization Placement   Post-Acute Placement Status Referrals Sent   Discharge Plan   Discharge Plan A Skilled Nursing Facility   Discharge Plan B Skilled Nursing Facility     Pt was denied from [facility], [facility], [facility], [facility], [facility], [facility], [facility], [facility], [facility], [facility], and [facility].    [facility] is reviewing her record but said she may be in her copay days.  NISH will review. CHRIS left a message for admissions at [facility]. CHRIS/CASSIUS will f/u as needed.    Janie Abbasi, DARLYN   PRN

## 2022-01-26 NOTE — SUBJECTIVE & OBJECTIVE
Care After Your Knee Replacement  Valerio Becerra D.O.  [phone] or [phone]  [phone] or [phone]  · Severe pain.  · Numbness.  · Fever  Scheduled Meds:   sodium chloride 0.9%   Intravenous Once    anastrozole  1 mg Oral Daily    apixaban  2.5 mg Oral BID    atorvastatin  80 mg Oral Daily    cloNIDine  0.2 mg Oral TID    epoetin chloe-ebpx (RETACRIT) injection  50 Units/kg Intravenous Every Tues, Thurs, Sat    ergocalciferol  50,000 Units Oral Q7 Days    famotidine  20 mg Oral Daily    furosemide  160 mg Oral BID    gemfibroziL  600 mg Oral Every Mon, Wed, Fri    levothyroxine  50 mcg Oral Before breakfast    lisinopriL  10 mg Oral Daily    meropenem (MERREM) IVPB  1 g Intravenous Q24H    metoprolol tartrate  25 mg Oral BID    mupirocin   Nasal BID    oxybutynin  10 mg Oral Daily    polyethylene glycol  17 g Oral BID    senna-docusate 8.6-50 mg  1 tablet Oral BID    venlafaxine  150 mg Oral Daily     Continuous Infusions:  PRN Meds:acetaminophen, butalbital-acetaminophen-caffeine -40 mg, dextrose 50%, dextrose 50%, glucagon (human recombinant), glucose, glucose, heparin (porcine), influenza, insulin aspart U-100, melatonin, methocarbamoL, ondansetron, oxyCODONE, sodium chloride 0.9%, sodium chloride 0.9%, sumatriptan    Review of patient's allergies indicates:  No Known Allergies     Past Medical History:   Diagnosis Date    Cervicogenic migraine     Chronic pain     CKD (chronic kidney disease) stage 4, GFR 15-29 ml/min     [facility]    CKD (chronic kidney disease) stage 4, GFR 15-29 ml/min     Diabetes mellitus     Long term use of Insulin, Diabetic Neuropathy    Fibromyalgia     Hydronephrosis     Hyperlipidemia     Hypertension 12/12/2012    Hypothyroidism 12/12/2012    ARON (iron deficiency anemia)     Insomnia     Levoscoliosis     Malignant neoplasm of upper-outer quadrant of left breast in female, estrogen receptor positive     Metabolic acidosis     Mobility impaired     Nuclear sclerosis - Both Eyes 3/24/2014    VIJAYA (obstructive sleep apnea)     Osteopenia     Pulmonary nodule     Recurrent UTI      Renal manifestation of secondary diabetes mellitus     Secondary hyperparathyroidism, renal     Urinary retention      Past Surgical History:   Procedure Laterality Date    BREAST BIOPSY Right     benign    CHOLECYSTECTOMY      COLONOSCOPY N/A 1/13/2017    Procedure: COLONOSCOPY;  Surgeon: Morris Wiseman MD;  Location: [facility] ENDO (4TH FLR);  Service: Endoscopy;  Laterality: N/A;  Renal pt Nephrology advised to avoid phosphate preps    CYSTOSCOPY N/A 10/8/2018    Procedure: CYSTOSCOPY;  Surgeon: oRnel Whittington MD;  Location: [facility] OR 1ST FLR;  Service: Urology;  Laterality: N/A;  45 min    CYSTOSCOPY N/A 3/25/2019    Procedure: CYSTOSCOPY;  Surgeon: Ronel Whittington MD;  Location: [facility] OR 1ST FLR;  Service: Urology;  Laterality: N/A;  45 min    CYSTOSCOPY N/A 8/26/2019    Procedure: CYSTOSCOPY;  Surgeon: Ronel Whittington MD;  Location: [facility] OR 1ST FLR;  Service: Urology;  Laterality: N/A;  45 min    CYSTOSCOPY N/A 7/2/2021    Procedure: CYSTOSCOPY;  Surgeon: Ronel Whittington MD;  Location: [facility] OR 1ST FLR;  Service: Urology;  Laterality: N/A;    CYSTOSCOPY  12/23/2021    Procedure: CYSTOSCOPY;  Surgeon: Ronel Whittington MD;  Location: [facility] OR 1ST FLR;  Service: Urology;;    CYSTOSCOPY W/ URETERAL STENT PLACEMENT Left 5/15/2021    Procedure: CYSTOSCOPY, WITH URETERAL STENT INSERTION;  Surgeon: Levy Sánchez Jr., MD;  Location: [facility] OR 1ST FLR;  Service: Urology;  Laterality: Left;    CYSTOSCOPY WITH BIOPSY OF BLADDER N/A 1/27/2020    Procedure: CYSTOSCOPY, WITH BLADDER BIOPSY, WITH FULGURATION IF INDICATED;  Surgeon: Ronel Whittington MD;  Location: [facility] OR 1ST FLR;  Service: Urology;  Laterality: N/A;    DILATION AND CURETTAGE OF UTERUS      GALLBLADDER SURGERY  2006    HYSTERECTOMY      INJECTION FOR SENTINEL NODE IDENTIFICATION Left 8/1/2019    Procedure: INJECTION, FOR SENTINEL NODE IDENTIFICATION;  Surgeon: Samia Fulton MD;  Location: [facility] OR [facility]  greater than 101 degrees.  · Change in temperature or color of the affected extremity.  · Calf pain (this may signify a blood clot in your calf).       FLR;  Service: General;  Laterality: Left;    INJECTION OF BOTULINUM TOXIN TYPE A N/A 10/8/2018    Procedure: INJECTION, BOTULINUM TOXIN, TYPE A 300 UNITS;  Surgeon: Ronel Whittington MD;  [facility];  Service: Urology;  Laterality: N/A;    INJECTION OF BOTULINUM TOXIN TYPE A N/A 3/25/2019    Procedure: INJECTION, BOTULINUM TOXIN, TYPE A 300 UNITS;  Surgeon: Ronel Whittington MD;  [facility];  Service: Urology;  Laterality: N/A;    INJECTION OF BOTULINUM TOXIN TYPE A N/A 8/26/2019    Procedure: INJECTION, BOTULINUM TOXIN, TYPE A 300 UNITS;  Surgeon: Ronel Whittington MD;  [facility];  Service: Urology;  Laterality: N/A;    MASTECTOMY Left 8/1/2019    Procedure: MASTECTOMY 23 hour stay;  Surgeon: Samia Fulton MD;  [facility];  Service: General;  Laterality: Left;    OVARIAN CYST SURGERY  1985    REPLACEMENT OF STENT Left 12/23/2021    Procedure: REPLACEMENT, STENT;  Surgeon: Ronel Whittington MD;  [facility];  Service: Urology;  Laterality: Left;    SENTINEL LYMPH NODE BIOPSY Left 8/1/2019    Procedure: BIOPSY, LYMPH NODE, SENTINEL;  Surgeon: Samia Fulton MD;  [facility];  Service: General;  Laterality: Left;    spt placement      TONSILLECTOMY, ADENOIDECTOMY      TOTAL ABDOMINAL HYSTERECTOMY W/ BILATERAL SALPINGOOPHORECTOMY  1985       Family History     Problem Relation (Age of Onset)    ALS Mother    Cancer Maternal Grandmother, Paternal Grandfather, Brother    Diabetes Sister, Maternal Aunt, Maternal Uncle    Kidney disease Sister, Maternal Aunt    Prostate cancer Brother    Scoliosis Brother        Tobacco Use    Smoking status: Never Smoker    Smokeless tobacco: Never Used   Substance and Sexual Activity    Alcohol use: No     Alcohol/week: 0.0 standard drinks    Drug use: No    Sexual activity: Not Currently     Birth control/protection: Abstinence     Review of Systems   Skin:  Positive for wound.       Objective:     Vital Signs (Most Recent):  Temp: 98.8 °F (37.1 °C) (01/26/22 1113)  Pulse: 79 (01/26/22 1113)  Resp: 18 (01/26/22 1414)  BP: 137/61 (01/26/22 1113)  SpO2: 96 % (01/26/22 1113) Vital Signs (24h Range):  Temp:  [96.7 °F (35.9 °C)-98.8 °F (37.1 °C)] 98.8 °F (37.1 °C)  Pulse:  [72-84] 79  Resp:  [16-18] 18  SpO2:  [91 %-96 %] 96 %  BP: (136-175)/(61-81) 137/61     Weight: 120.7 kg (265 lb 15.8 oz)  Body mass index is 47.12 kg/m².  Physical Exam  Constitutional:       Appearance: Normal appearance.   Skin:     General: Skin is warm and dry.      Findings: Lesion present.   Neurological:      Mental Status: She is alert.         Laboratory:  All pertinent labs reviewed within the last 24 hours.    Diagnostic Results:  None

## 2022-01-26 NOTE — PT/OT/SLP PROGRESS
"Physical Therapy Treatment    Patient Name:  Cecile Bowen   MRN:  [id number]    Recommendations:     Discharge Recommendations:  nursing facility, skilled   Discharge Equipment Recommendations: none   Barriers to discharge: Decreased caregiver support    Assessment:     Cecile Bowen is a 71 y.o. female admitted with a medical diagnosis of Dialysis patient.  She presents with the following impairments/functional limitations:  weakness,impaired endurance,impaired self care skills,impaired functional mobilty,decreased lower extremity function,impaired balance,pain,decreased ROM.    Patient agreeable to therapy. She was able to transfer to EOB with SBA with HOB elevated slightly. Sat EOB with good balance and increased pain to back and neck. She required Max a x 2 to stand with RW and without RW with therapists blocking knees and giving support at hips to stand more erect. She did better without RW. Returned to sup with Max a. Will continue PT per POC.     Rehab Prognosis: Good; patient would benefit from acute skilled PT services to address these deficits and reach maximum level of function.    Recent Surgery: * No surgery found *      Plan:     During this hospitalization, patient to be seen 3 x/week to address the identified rehab impairments via gait training,therapeutic activities,therapeutic exercises and progress toward the following goals:    · Plan of Care Expires:  02/22/22    Subjective     Chief Complaint: pain  Patient/Family Comments/goals: "I just want to be able to get to a bedside commode".   Pain/Comfort:  · Pain Rating 1: 9/10  · Location - Orientation 1: generalized  · Location 1: back  · Pain Addressed 1: Pre-medicate for activity,Reposition,Distraction      Objective:     Communicated with Nurse prior to session.  Patient found supine with telemetry,peripheral IV,murillo catheter upon PT entry to room.     General Precautions: Standard, fall,contact   Orthopedic Precautions:N/A   Braces: " N/A  Respiratory Status: Room air     Functional Mobility:  · Bed Mobility:     · Supine to Sit: stand by assistance and with HOB elevated  · Sit to Supine: maximal assistance and of 2 persons  · Transfers:     · Sit to Stand:  maximal assistance and of 2 persons with no AD, rolling walker and better, more erect stand with no AD and assist of therapists with blocking, cues at knees and hips      AM-PAC 6 CLICK MOBILITY  Turning over in bed (including adjusting bedclothes, sheets and blankets)?: 3  Sitting down on and standing up from a chair with arms (e.g., wheelchair, bedside commode, etc.): 2  Moving from lying on back to sitting on the side of the bed?: 3 (with HOB elevated slightly)  Moving to and from a bed to a chair (including a wheelchair)?: 1  Need to walk in hospital room?: 1  Climbing 3-5 steps with a railing?: 1  Basic Mobility Total Score: 11       Therapeutic Activities and Exercises:   -white board updated  -Educ patient on benefit of therapy and SNF      Patient left supine with all lines intact and call button in reach..    GOALS:   Multidisciplinary Problems     Physical Therapy Goals        Problem: Physical Therapy Goal    Goal Priority Disciplines Outcome Goal Variances Interventions   Physical Therapy Goal     PT, PT/OT Ongoing, Progressing     Description: Goals to be met by: 22    Patient will increase functional independence with mobility by performin. Supine to sit with MInimal Assistance  2. Sit to supine with MInimal Assistance  3. Sit to stand transfer with Maximum Assistance  4.Patient will demonstrate independence with a home exercise program  5. Bed to chair transfer with Contact Guard Assistance using Slideboard                   Time Tracking:     PT Received On: 22  PT Start Time: 1409     PT Stop Time: 1438  PT Total Time (min): 29 min     Billable Minutes: Therapeutic Activity 29    Treatment Type: Treatment  PT/PTA: PTA     PTA Visit Number: 1     2022

## 2022-01-26 NOTE — PT/OT/SLP PROGRESS
"Occupational Therapy   Co-Treatment with Physical Therapy    Name: Cecile Bowen  MRN: [id number]  Admitting Diagnosis:  Dialysis patient      Pre-op Diagnosis: * No surgery found *    Recommendations:     Discharge Recommendations: nursing facility, skilled  Discharge Equipment Recommendations:  none  Barriers to discharge:   (requires increased assist)    Assessment:     Cecile Bowen is a 71 y.o. female with a medical diagnosis of Dialysis patient.  She presents with the following performance deficits affecting function are weakness,impaired endurance,impaired self care skills,impaired functional mobilty,decreased upper extremity function,decreased lower extremity function,decreased ROM,pain.   Patient agreeable to OT session and tolerated fair secondary to c/o back pain. Patient completed bed mobility with SBA and functional t/f x2 trials of sit<>stand with Max(A)x2, with bed elevated, B knees blocked. Patient would benefit from continued OT services to address deficits and progress towards goals. Continue OT POC.    Rehab Prognosis:  Good; patient would benefit from acute skilled OT services to address these deficits and reach maximum level of function.       Plan:     Patient to be seen 3 x/week to address the above listed problems via self-care/home management,therapeutic activities,therapeutic exercises  · Plan of Care Expires: 02/22/22  · Plan of Care Reviewed with: patient    Subjective   "[Back] hurts all over."  "I do everything in my lift chair. I wipe by moving side to side."  "I haven't walked since last March."    Pain/Comfort:  · Pain Rating 1: 9/10  · Location - Orientation 1: generalized  · Location 1: back  · Pain Addressed 1: Pre-medicate for activity,Reposition,Distraction,Nurse notified  · Pain Rating Post-Intervention 1: other (see comments) (Pt did not rate)    Objective:     Communicated with: RN and OTR prior to session.  Patient found HOB elevated with telemetry,peripheral IV,murillo " catheter upon OT entry to room.  A client care conference was completed by the OTR and the GARCIA prior to treatment by the GARCIA to discuss the patient's POC and current status.    General Precautions: Standard, fall,contact   Orthopedic Precautions:N/A   Braces: N/A  Respiratory Status: Room air     Occupational Performance:     Bed Mobility:    · Patient completed Rolling/Turning to Left with  stand by assistance, with side rail and HOB elevated  · Patient completed Scooting anteriorly to plant B feet on floor with stand by assistance and scooting in supine towards HOB with Total(A)x2 using draw sheet  · Scooting laterally to (L) along EOB with Max(A)  · Patient completed Supine to Sit with stand by assistance, with side rail and HOB elevated  · Patient completed Sit to Supine with moderate assistance, with side rail and HOB flat with BLE management     Functional Mobility/Transfers:  · Patient completed Sit <> Stand Transfer with maximal assistance and of 2 persons  with  hand-held assist and B knees blocked with bed elevated    · x2 trials from EOB, with cues for proper body mechanics  · x1 incomplete trial using RW  · Functional Mobility: Unable to safely complete at this time.    Activities of Daily Living:  · Grooming: supervision for safety seated EOB to complete hair brushing and facial hygiene  · Bathing: supervision for safety seated EOB to complete abbreviated UB sponge bath  · Upper Body Dressing: stand by assistance for safety and orienting new gown to don      AMPAC 6 Click ADL: 16    Treatment & Education:  Reviewed OT POC and goals  ADL re-training  Patient tolerated ~15 min seated EOB with Supervision for dynamic sitting balance.   Instruction and facilitation in safe transfer techniques including proper body mechanics, weight shifting, scooting technique, and hand placement  Co-treatment performed with PTA due to patient's complexity and benefit of 2 skilled therapists to facilitate functional and  safe occupational performance, accommodate patient's activity tolerance, and maximize patient's participation in therapy.     Patient left HOB elevated with all lines intact and call button in reachEducation:      GOALS:   Multidisciplinary Problems     Occupational Therapy Goals        Problem: Occupational Therapy Goal    Goal Priority Disciplines Outcome Interventions   Occupational Therapy Goal     OT, PT/OT Ongoing, Progressing    Description: Goals to be met by: 02-03-22     Patient will increase functional independence with ADLs by performing:    UE Dressing with Set-up Assistance.  Grooming while seated with Set-up Assistance.  Toileting from bedside commode with Moderate Assistance for hygiene and clothing management using drop arm commode and slide board  Sitting at edge of bed x15 minutes with Modified [place].  Supine to sit with Minimal Assistance.  Slide board  transfers with Minimal Assistance.  Pt. To be I with HEP to BUE to improve level of endurance (RUE with RTC injury)  Pt. To perform sit to stand from EOB with Mod A                     Time Tracking:     OT Date of Treatment: 01/26/22  OT Start Time: 1408  OT Stop Time: 1438  OT Total Time (min): 30 min    Billable Minutes:Self Care/Home Management 16  Therapeutic Activity 14    OT/SUZI: SUZI ARCHER Visit Number: 1 1/26/2022

## 2022-01-26 NOTE — HPI
Cecile Bowen maikel 71F with ESRD (on HD), DM2, HTN, breast cancer s/p mastectomy, and recurrent UTI presents with chest heaviness. Pt recently seen in ED for similar chest heaviness. She reports a productive cough and missing some medications. She has a chronic suprapubic catheter of 5 years and is bed bound at baseline, but has a wheelchair for mobility but rarely uses it. Wound care is consulted for evaluation of buttocks skin injuries.

## 2022-01-26 NOTE — CONSULTS
[facility] Medicine  Telemedicine Consult Note    Patient Name: Cecile Bowen  MRN: [id number]  Admission Date: 1/21/2022  Hospital Length of Stay: 0 days  Attending Physician: Bashir Moses MD   Primary Care Provider: Kailey Navarro MD     Thank you for your consult to [facility]. We have reviewed the patient chart. This patient does meet criteria for [facility] service at this time. Will assume care on 01/26/22 at 7AM.        Liana Ruiz MD  Department of Hospital Medicine   [facility]

## 2022-01-26 NOTE — ASSESSMENT & PLAN NOTE
- POA pressure injury stage 3 noted to buttocks.  - continue Triad bid. Applied per NP.  - pt can turn independently in bed. Encouraged to turn q2h.  - MAINE surface with waffle overlay in place.  - Nursing to maintain pressure injury prevention measures.

## 2022-01-26 NOTE — PLAN OF CARE
Problem: Occupational Therapy Goal  Goal: Occupational Therapy Goal  Description: Goals to be met by: 02-03-22     Patient will increase functional independence with ADLs by performing:    UE Dressing with Set-up Assistance.  Grooming while seated with Set-up Assistance.  Toileting from bedside commode with Moderate Assistance for hygiene and clothing management using drop arm commode and slide board  Sitting at edge of bed x15 minutes with Modified [place].  Supine to sit with Minimal Assistance.  Slide board  transfers with Minimal Assistance.  Pt. To be I with HEP to BUE to improve level of endurance (RUE with RTC injury)  Pt. To perform sit to stand from EOB with Mod A    Outcome: Ongoing, Progressing

## 2022-01-27 LAB
ANION GAP SERPL CALC-SCNC: 6 MMOL/L (ref 8–16)
BASOPHILS # BLD AUTO: 0.05 K/UL (ref 0–0.2)
BASOPHILS NFR BLD: 0.7 % (ref 0–1.9)
BUN SERPL-MCNC: 24 MG/DL (ref 8–23)
CALCIUM SERPL-MCNC: 7.9 MG/DL (ref 8.7–10.5)
CHLORIDE SERPL-SCNC: 96 MMOL/L (ref 95–110)
CO2 SERPL-SCNC: 28 MMOL/L (ref 23–29)
CREAT CL/1.73 SQ M 12H UR+SERPL-ARVRAT: 14 ML/MIN (ref 70–110)
CREAT SERPL-MCNC: 1.8 MG/DL (ref 0.5–1.4)
CREAT SERPL-MCNC: 1.8 MG/DL (ref 0.5–1.4)
CREAT UR-MCNC: 18 MG/DL (ref 15–325)
CREATININE, URINE (MG/SPEC): 360 MG/SPEC
CYSTATIN C SERPL-MCNC: 3.84 MG/L (ref 0.67–1.21)
DIFFERENTIAL METHOD: ABNORMAL
EOSINOPHIL # BLD AUTO: 0.3 K/UL (ref 0–0.5)
EOSINOPHIL NFR BLD: 4 % (ref 0–8)
ERYTHROCYTE [DISTWIDTH] IN BLOOD BY AUTOMATED COUNT: 14.7 % (ref 11.5–14.5)
EST. GFR  (AFRICAN AMERICAN): 32.6 ML/MIN/1.73 M^2
EST. GFR  (NON AFRICAN AMERICAN): 28.3 ML/MIN/1.73 M^2
GFR/BSA.PRED SERPLBLD CYS-BASED-ARV: 12 ML/MIN/BSA
GLUCOSE SERPL-MCNC: 168 MG/DL (ref 70–110)
HCT VFR BLD AUTO: 26.1 % (ref 37–48.5)
HGB BLD-MCNC: 8.1 G/DL (ref 12–16)
IMM GRANULOCYTES # BLD AUTO: 0.21 K/UL (ref 0–0.04)
IMM GRANULOCYTES NFR BLD AUTO: 2.8 % (ref 0–0.5)
LYMPHOCYTES # BLD AUTO: 2.2 K/UL (ref 1–4.8)
LYMPHOCYTES NFR BLD: 29.8 % (ref 18–48)
MAGNESIUM SERPL-MCNC: 1.5 MG/DL (ref 1.6–2.6)
MCH RBC QN AUTO: 28.4 PG (ref 27–31)
MCHC RBC AUTO-ENTMCNC: 31 G/DL (ref 32–36)
MCV RBC AUTO: 92 FL (ref 82–98)
MONOCYTES # BLD AUTO: 0.9 K/UL (ref 0.3–1)
MONOCYTES NFR BLD: 11.5 % (ref 4–15)
NEUTROPHILS # BLD AUTO: 3.8 K/UL (ref 1.8–7.7)
NEUTROPHILS NFR BLD: 51.2 % (ref 38–73)
NRBC BLD-RTO: 0 /100 WBC
PHOSPHATE SERPL-MCNC: 4.8 MG/DL (ref 2.7–4.5)
PLATELET # BLD AUTO: 235 K/UL (ref 150–450)
PMV BLD AUTO: 9.2 FL (ref 9.2–12.9)
POCT GLUCOSE: 160 MG/DL (ref 70–110)
POCT GLUCOSE: 170 MG/DL (ref 70–110)
POCT GLUCOSE: 192 MG/DL (ref 70–110)
POCT GLUCOSE: 207 MG/DL (ref 70–110)
POTASSIUM SERPL-SCNC: 3.9 MMOL/L (ref 3.5–5.1)
RBC # BLD AUTO: 2.85 M/UL (ref 4–5.4)
SODIUM SERPL-SCNC: 130 MMOL/L (ref 136–145)
TB INDURATION 48 - 72 HR READ: 0 MM
URINE COLLECTION DURATION: 24 HR
URINE VOLUME: 2000 ML
WBC # BLD AUTO: 7.41 K/UL (ref 3.9–12.7)

## 2022-01-27 PROCEDURE — 83735 ASSAY OF MAGNESIUM: CPT | Mod: HCNC

## 2022-01-27 PROCEDURE — 27000207 HC ISOLATION: Mod: HCNC

## 2022-01-27 PROCEDURE — 25000003 PHARM REV CODE 250: Mod: HCNC

## 2022-01-27 PROCEDURE — 36415 COLL VENOUS BLD VENIPUNCTURE: CPT | Mod: HCNC

## 2022-01-27 PROCEDURE — 63600175 PHARM REV CODE 636 W HCPCS: Mod: HCNC | Performed by: PHYSICIAN ASSISTANT

## 2022-01-27 PROCEDURE — 25000003 PHARM REV CODE 250: Mod: HCNC | Performed by: STUDENT IN AN ORGANIZED HEALTH CARE EDUCATION/TRAINING PROGRAM

## 2022-01-27 PROCEDURE — 84100 ASSAY OF PHOSPHORUS: CPT | Mod: HCNC

## 2022-01-27 PROCEDURE — 82575 CREATININE CLEARANCE TEST: CPT | Mod: HCNC | Performed by: HOSPITALIST

## 2022-01-27 PROCEDURE — 85025 COMPLETE CBC W/AUTO DIFF WBC: CPT | Mod: HCNC

## 2022-01-27 PROCEDURE — 94761 N-INVAS EAR/PLS OXIMETRY MLT: CPT | Mod: HCNC

## 2022-01-27 PROCEDURE — 25000003 PHARM REV CODE 250: Mod: HCNC | Performed by: NURSE PRACTITIONER

## 2022-01-27 PROCEDURE — 80048 BASIC METABOLIC PNL TOTAL CA: CPT | Mod: HCNC

## 2022-01-27 PROCEDURE — 11000001 HC ACUTE MED/SURG PRIVATE ROOM: Mod: HCNC

## 2022-01-27 RX ORDER — SUMATRIPTAN 50 MG/1
50 TABLET, FILM COATED ORAL ONCE
Status: COMPLETED | OUTPATIENT
Start: 2022-01-27 | End: 2022-01-27

## 2022-01-27 RX ADMIN — INSULIN ASPART 2 UNITS: 100 INJECTION, SOLUTION INTRAVENOUS; SUBCUTANEOUS at 12:01

## 2022-01-27 RX ADMIN — METOPROLOL TARTRATE 25 MG: 25 TABLET, FILM COATED ORAL at 09:01

## 2022-01-27 RX ADMIN — CLONIDINE HYDROCHLORIDE 0.2 MG: 0.2 TABLET ORAL at 09:01

## 2022-01-27 RX ADMIN — SUMATRIPTAN SUCCINATE 50 MG: 50 TABLET ORAL at 11:01

## 2022-01-27 RX ADMIN — SENNOSIDES AND DOCUSATE SODIUM 1 TABLET: 50; 8.6 TABLET ORAL at 09:01

## 2022-01-27 RX ADMIN — OXYCODONE HYDROCHLORIDE 10 MG: 10 TABLET ORAL at 09:01

## 2022-01-27 RX ADMIN — LEVOTHYROXINE SODIUM 50 MCG: 50 TABLET ORAL at 05:01

## 2022-01-27 RX ADMIN — FUROSEMIDE 160 MG: 80 TABLET ORAL at 09:01

## 2022-01-27 RX ADMIN — APIXABAN 2.5 MG: 2.5 TABLET, FILM COATED ORAL at 09:01

## 2022-01-27 RX ADMIN — BUTALBITAL, ACETAMINOPHEN, AND CAFFEINE 1 TABLET: 50; 325; 40 TABLET ORAL at 03:01

## 2022-01-27 RX ADMIN — VENLAFAXINE HYDROCHLORIDE 150 MG: 75 CAPSULE, EXTENDED RELEASE ORAL at 09:01

## 2022-01-27 RX ADMIN — ATORVASTATIN CALCIUM 80 MG: 20 TABLET, FILM COATED ORAL at 09:01

## 2022-01-27 RX ADMIN — LISINOPRIL 10 MG: 10 TABLET ORAL at 09:01

## 2022-01-27 RX ADMIN — ANASTROZOLE 1 MG: 1 TABLET, COATED ORAL at 09:01

## 2022-01-27 RX ADMIN — FAMOTIDINE 20 MG: 20 TABLET ORAL at 09:01

## 2022-01-27 RX ADMIN — MEROPENEM AND SODIUM CHLORIDE 1 G: 1 INJECTION, SOLUTION INTRAVENOUS at 10:01

## 2022-01-27 RX ADMIN — OXYCODONE HYDROCHLORIDE 10 MG: 10 TABLET ORAL at 04:01

## 2022-01-27 RX ADMIN — CLONIDINE HYDROCHLORIDE 0.2 MG: 0.2 TABLET ORAL at 04:01

## 2022-01-27 RX ADMIN — OXYBUTYNIN CHLORIDE 10 MG: 10 TABLET, FILM COATED, EXTENDED RELEASE ORAL at 09:01

## 2022-01-27 RX ADMIN — POLYETHYLENE GLYCOL 3350 17 G: 17 POWDER, FOR SOLUTION ORAL at 09:01

## 2022-01-27 RX ADMIN — Medication 6 MG: at 09:01

## 2022-01-27 NOTE — PLAN OF CARE
CHRIS spoke with Cathy at [facility]-she is reviewing ref, requested additional info. They are able to accept HD pts as long as they go to [facility] or [facility] in [facility]. CHRIS updated additional clinicals fo review.    Message left for admissions at [facility].     Message left for admissions at [facility].    Katie Gibson, GAETANOW  PRN

## 2022-01-27 NOTE — PLAN OF CARE
Problem: Skin Injury Risk Increased  Goal: Skin Health and Integrity  Outcome: Ongoing, Progressing     Problem: Adult Inpatient Plan of Care  Goal: Plan of Care Review  Outcome: Ongoing, Progressing  Goal: Patient-Specific Goal (Individualized)  Outcome: Ongoing, Progressing  Goal: Absence of Hospital-Acquired Illness or Injury  Outcome: Ongoing, Progressing  Goal: Optimal Comfort and Wellbeing  Outcome: Ongoing, Progressing  Goal: Readiness for Transition of Care  Outcome: Ongoing, Progressing     Problem: Bariatric Environmental Safety  Goal: Safety Maintained with Care  Outcome: Ongoing, Progressing     Problem: Device-Related Complication Risk (Hemodialysis)  Goal: Safe, Effective Therapy Delivery  Outcome: Ongoing, Progressing     Problem: Hemodynamic Instability (Hemodialysis)  Goal: Effective Tissue Perfusion  Outcome: Ongoing, Progressing     Problem: Infection (Hemodialysis)  Goal: Absence of Infection Signs and Symptoms  Outcome: Ongoing, Progressing     Problem: Diabetes Comorbidity  Goal: Blood Glucose Level Within Targeted Range  Outcome: Ongoing, Progressing     Problem: Fluid and Electrolyte Imbalance (Acute Kidney Injury/Impairment)  Goal: Fluid and Electrolyte Balance  Outcome: Ongoing, Progressing     Problem: Oral Intake Inadequate (Acute Kidney Injury/Impairment)  Goal: Optimal Nutrition Intake  Outcome: Ongoing, Progressing     Problem: Renal Function Impairment (Acute Kidney Injury/Impairment)  Goal: Effective Renal Function  Outcome: Ongoing, Progressing     Problem: Infection  Goal: Absence of Infection Signs and Symptoms  Outcome: Ongoing, Progressing     Problem: Impaired Wound Healing  Goal: Optimal Wound Healing  Outcome: Ongoing, Progressing    Patient in bed resting with T.V. on. The patient complain of pain today @ 10:11 received oxycodone 10mg @ 11:00 patient states relief of pain.

## 2022-01-27 NOTE — ASSESSMENT & PLAN NOTE
Chronic hyponatremia 130 baseline, 126 on admission  Secondary to fluid overload, UTI  Nephro consulted for HD  Na improved

## 2022-01-27 NOTE — ASSESSMENT & PLAN NOTE
"Dialysis dependent    Nephrology consulted, appreciate recs  Last HD 1/25  "S/p HD session today. Pt made 3 lit urine . Will hold off on next HD session and evaluate the need for RRT on daily basis"  Labs pending  Nephrology recs pending for dialysis needs      "

## 2022-01-27 NOTE — SUBJECTIVE & OBJECTIVE
Interval History: no acute events overnight. C/o frontal headache/migraine. Stood up with PT yesterday, patient is happy about that. Dialysis on hold for now. Awaiting SNF placement.     Review of Systems   Constitutional: Positive for fatigue. Negative for chills and fever.   Respiratory: Negative for cough and shortness of breath.    Cardiovascular: Negative for chest pain.   Gastrointestinal: Negative for abdominal pain.   Skin: Positive for wound.   Neurological: Positive for weakness and headaches.     Objective:     Vital Signs (Most Recent):  Temp: 97.2 °F (36.2 °C) (01/27/22 1607)  Pulse: 78 (01/27/22 1607)  Resp: 16 (01/27/22 1639)  BP: (!) 189/88 (01/27/22 1607)  SpO2: (!) 92 % (01/27/22 1607) Vital Signs (24h Range):  Temp:  [97.2 °F (36.2 °C)-98.7 °F (37.1 °C)] 97.2 °F (36.2 °C)  Pulse:  [75-83] 78  Resp:  [16-20] 16  SpO2:  [91 %-93 %] 92 %  BP: ()/(57-88) 189/88     Weight: 120.7 kg (265 lb 15.8 oz)  Body mass index is 47.12 kg/m².    Intake/Output Summary (Last 24 hours) at 1/27/2022 1655  Last data filed at 1/27/2022 0700  Gross per 24 hour   Intake 200 ml   Output 1700 ml   Net -1500 ml      Physical Exam  Vitals and nursing note reviewed.   Constitutional:       General: She is not in acute distress.     Appearance: She is obese. She is ill-appearing.   Cardiovascular:      Rate and Rhythm: Normal rate.   Pulmonary:      Effort: Pulmonary effort is normal. No respiratory distress.   Neurological:      Mental Status: She is alert and oriented to person, place, and time.      Motor: Weakness present.   Psychiatric:         Behavior: Behavior normal.         Thought Content: Thought content normal.         Significant Labs: All pertinent labs within the past 24 hours have been reviewed.    Significant Imaging: I have reviewed all pertinent imaging results/findings within the past 24 hours.

## 2022-01-27 NOTE — PROGRESS NOTES
[facility] Medicine  Telemedicine Progress Note    Patient Name: Cecile Bowen  MRN: [id number]  Patient Class: IP- Inpatient   Admission Date: 1/21/2022  Length of Stay: 1 days  Attending Physician: Liana Ruiz MD  Primary Care Provider: Kailey Navarro MD          Subjective:     Principal Problem:Dialysis patient        HPI:  Cecile Bowen maikel 71F with ESRD (on HD), DM2, HTN, breast cancer s/p mastectomy, and recurrent UTI presents with chest heaviness. Pt recently seen in ED for similar chest heaviness. She reports a productive cough and missing some medications. She has a chronic suprapubic catheter of 5 years and is bed bound at baseline, but has a wheelchair for mobility but rarely uses it. She was started on HD 2 months ago and has issues making HD appointments due to transportation. She has missed her last two appointments. Last session was Monday prior to discharge from SNF. She had been at SNF since 12/30/21 when she was admitted after a long hospital stay after being found down at home 11/28/21. During her hospital stay, she was treated for possible STEMI, worsening CKD, metabolic acidosis and required intubation for acute respiratory failure. She was discharged to [facility] and returned home on 1/17/22 after her HD session.    In the ED, she is afebrile and HD stable. Labs positive for dirty UA, hyponatremia, hypokalemia, hypochloridemia, elevated BUN, elevated Cr, and elevated BNP. She was started on Rocephin for UTI and nephrology was consulted for HD. She was admitted to hospital medicine for further management.      Overview/Hospital Course:  Patient admitted for UTI and missed dialysis appointments due to transportation issues. Patient started on Rocephin for UTI and ID was consulted for antibiotic recommendations as patient has history of ESBL and MDRO. Pt transitioned to meropenem followed by Bactrim for 3 days for ESBL UTI. Midline consult ordered  for extended abx coverage. Nephrology was consulted for HD while inpatient. Social work was consulted for discharge planning, working on SNF placement. PT/OT consulted and recommending SNF. Wound care consulted for skin breakdown with recs.       Interval History: no acute events overnight. No complaints besides feeling tired. Up with PT. Awaiting SNF placement.     Review of Systems   Constitutional: Positive for fatigue. Negative for chills and fever.   Respiratory: Negative for cough and shortness of breath.    Cardiovascular: Negative for chest pain.   Gastrointestinal: Negative for abdominal pain.   Neurological: Positive for weakness.     Objective:     Vital Signs (Most Recent):  Temp: 97.9 °F (36.6 °C) (01/26/22 1600)  Pulse: 79 (01/26/22 1600)  Resp: 16 (01/26/22 1600)  BP: (!) 130/56 (01/26/22 1600)  SpO2: (!) 94 % (01/26/22 1600) Vital Signs (24h Range):  Temp:  [96.7 °F (35.9 °C)-98.8 °F (37.1 °C)] 97.9 °F (36.6 °C)  Pulse:  [72-84] 79  Resp:  [16-18] 16  SpO2:  [91 %-96 %] 94 %  BP: (130-175)/(56-81) 130/56     Weight: 120.7 kg (265 lb 15.8 oz)  Body mass index is 47.12 kg/m².    Intake/Output Summary (Last 24 hours) at 1/26/2022 1752  Last data filed at 1/26/2022 1100  Gross per 24 hour   Intake 480 ml   Output 1500 ml   Net -1020 ml      Physical Exam  Vitals and nursing note reviewed.   Constitutional:       General: She is not in acute distress.     Appearance: She is obese. She is ill-appearing.   Cardiovascular:      Rate and Rhythm: Normal rate.   Pulmonary:      Effort: Pulmonary effort is normal. No respiratory distress.   Neurological:      Mental Status: She is alert and oriented to person, place, and time.      Motor: Weakness present.   Psychiatric:         Behavior: Behavior normal.         Thought Content: Thought content normal.         Significant Labs: All pertinent labs within the past 24 hours have been reviewed.    Significant Imaging: I have reviewed all pertinent imaging  "results/findings within the past 24 hours.      Assessment/Plan:      * Dialysis patient  See CKD4      Pressure injury of right buttock, stage 3  Would care consulted  Appreciate recs      Urinary tract infection due to ESBL Klebsiella  See above      Anemia of chronic disease  stable      Secondary hypertension  Proteinuria on labs  Added lisinopril 10mg 1/24      Debility  PT/OT consulted  SNF recommended; pt wants OSNF  -- appreciate SW assistance  --placement pending     A-fib  Continue apixaban 2.5mg BID  Cont BB      ZAK (acute kidney injury)  Dialysis dependent ZAK  Nephrology consulted, appreciate recs  Suprapubic murillo declogged in ED, see UTI  Low albumin and history of nephrotic syndrome    Plan:  - Trend Cr  - Strict I&Os  - Avoid nephrotoxic agents  - Renally adjust medications  - Prealbumin low  - P/C ratio elevated 9.33  -- History of nephrotic syndrome      Normocytic anemia  At baseline on admission  Anemia of chronic disease    CBC daily  Transfuse if Hbg <7  Checking iron studies, folate, B12, and epo with HD    S/P left mastectomy  See malignant neoplasm of left breast      Requires daily assistance for activities of daily living (ADL) and comfort needs  Social work consulted for transportation to dialysis      Malignant neoplasm of left breast, estrogen receptor positive  History of breast cancer status post radical mastectomy on 8/1/2019 on anastrozole   Stable    Continue anastrozole 1mg daily    Cervicogenic migraine  PRNs ordered      CKD stage 4 due to type 2 diabetes mellitus  Dialysis dependent    Nephrology consulted, appreciate recs  Last HD 1/25  "S/p HD session today. Pt made 3 lit urine . Will hold off on next HD session and evaluate the need for RRT on daily basis"  Labs pending        Chronic pain  PRN multimodal pain regimen      Long term prescription opiate use   with Percocet and butalbital-aceaminophen-caffeine  See chronic pain, migraines      Morbid obesity due to excess " calories  Body mass index is 47.12 kg/m². Morbid obesity complicates all aspects of disease management from diagnostic modalities to treatment. Weight loss encouraged and health benefits explained to patient.     RD consulted  1500 calorie diabetic diet  BOOST    Suprapubic catheter  See neurogenic bladder      Uncontrolled type 2 diabetes mellitus with stage 4 chronic kidney disease, with long-term current use of insulin  A1c (1/4/2022) 5.4%    Diabetic diet  Continue to monitor  LDSSI  POCT BG    Major depressive disorder, recurrent episode, moderate  Not on any home medications    Continue to monitor  Consider psych consult if worsening affect        Neurogenic bladder  Continue oxybutynin 10mg daily, furosemide 160mg BID  Suprapubic catheter      Recurrent urinary tract infection  History of ESBL and MDRO with current UTI symptoms, dirty UA, and suprapubic catheter.    Plan:  - consult ID, appreciate recs  --  Plan on 3d of Meropenem (D3/3) then DC on Bactrim DS qod x 3 doses  -- Consider cont meropenem vs Bactrim if patient goes to OSNF  -- Exchange murillo cath after abx are complete  - UCx ESBL    I/D Recs as follows:  Plan: 1. Continue Meropenem x 7d total (from SP change 1/23 as urine still cloudy) - eoc 1/30 2. Rec SNF placement to complete IV abx 3. Contact isolation given prior ESBL 4. weekl CBC and CMP while on meropene 5. Discussed with ID staff 6. Needs urology close fu after dc 7. Will sign off          Hyponatremia  Chronic hyponatremia 130 baseline, 126 on admission  Secondary to fluid overload, UTI  Nephro consulted for HD  Na improved      VIJAYA (obstructive sleep apnea)  CPAP QHS      Hyperlipidemia associated with type 2 diabetes mellitus  Continue atorvastatin 80mg daily, gemfibrozil 600mg MWF      Fibromyalgia  PT/OT  Pain regimen      Hypothyroidism  Continue levothyroxine 50mcg daily        VTE Risk Mitigation (From admission, onward)         Ordered     heparin (porcine) injection 1,000 Units   As needed (PRN)         01/24/22 0828     apixaban tablet 2.5 mg  2 times daily         01/21/22 1609     IP VTE HIGH RISK PATIENT  Once         01/21/22 1609     Place sequential compression device  Until discontinued         01/21/22 1609                      I have assessed these finding virtually using telemed platform and with assistance of bedside nurse                 The attending portion of this evaluation, treatment, and documentation was performed per Gilda Sun NP via Telemedicine AudioVisual using the secure [employer] software platform with 2 way audio/video. The provider was located off-site and the patient is located in the hospital. The aforementioned video software was utilized to document the relevant history and physical exam    Gilda Sun NP  Department of Hospital Medicine   [facility]

## 2022-01-27 NOTE — PROGRESS NOTES
[facility] Medicine  Telemedicine Progress Note    Patient Name: Cecile Bowen  MRN: [id number]  Patient Class: IP- Inpatient   Admission Date: 1/21/2022  Length of Stay: 2 days  Attending Physician: Dorothy Kraus MD  Primary Care Provider: Kailey Navarro MD          Subjective:     Principal Problem:Dialysis patient        HPI:  Cecile Bowen maikel 71F with ESRD (on HD), DM2, HTN, breast cancer s/p mastectomy, and recurrent UTI presents with chest heaviness. Pt recently seen in ED for similar chest heaviness. She reports a productive cough and missing some medications. She has a chronic suprapubic catheter of 5 years and is bed bound at baseline, but has a wheelchair for mobility but rarely uses it. She was started on HD 2 months ago and has issues making HD appointments due to transportation. She has missed her last two appointments. Last session was Monday prior to discharge from SNF. She had been at SNF since 12/30/21 when she was admitted after a long hospital stay after being found down at home 11/28/21. During her hospital stay, she was treated for possible STEMI, worsening CKD, metabolic acidosis and required intubation for acute respiratory failure. She was discharged to [facility] and returned home on 1/17/22 after her HD session.    In the ED, she is afebrile and HD stable. Labs positive for dirty UA, hyponatremia, hypokalemia, hypochloridemia, elevated BUN, elevated Cr, and elevated BNP. She was started on Rocephin for UTI and nephrology was consulted for HD. She was admitted to hospital medicine for further management.      Overview/Hospital Course:  Patient admitted for UTI and missed dialysis appointments due to transportation issues. Patient started on Rocephin for UTI and ID was consulted for antibiotic recommendations as patient has history of ESBL and MDRO. Pt transitioned to meropenem followed by Bactrim for 3 days for ESBL UTI. Midline consult ordered  for extended abx coverage. Nephrology was consulted for HD while inpatient. Social work was consulted for discharge planning, working on SNF placement. PT/OT consulted and recommending SNF. Wound care consulted for skin breakdown with recs.       Interval History: no acute events overnight. C/o frontal headache/migraine. Stood up with PT yesterday, patient is happy about that. Dialysis on hold for now. Awaiting SNF placement.     Review of Systems   Constitutional: Positive for fatigue. Negative for chills and fever.   Respiratory: Negative for cough and shortness of breath.    Cardiovascular: Negative for chest pain.   Gastrointestinal: Negative for abdominal pain.   Skin: Positive for wound.   Neurological: Positive for weakness and headaches.     Objective:     Vital Signs (Most Recent):  Temp: 97.2 °F (36.2 °C) (01/27/22 1607)  Pulse: 78 (01/27/22 1607)  Resp: 16 (01/27/22 1639)  BP: (!) 189/88 (01/27/22 1607)  SpO2: (!) 92 % (01/27/22 1607) Vital Signs (24h Range):  Temp:  [97.2 °F (36.2 °C)-98.7 °F (37.1 °C)] 97.2 °F (36.2 °C)  Pulse:  [75-83] 78  Resp:  [16-20] 16  SpO2:  [91 %-93 %] 92 %  BP: ()/(57-88) 189/88     Weight: 120.7 kg (265 lb 15.8 oz)  Body mass index is 47.12 kg/m².    Intake/Output Summary (Last 24 hours) at 1/27/2022 1655  Last data filed at 1/27/2022 0700  Gross per 24 hour   Intake 200 ml   Output 1700 ml   Net -1500 ml      Physical Exam  Vitals and nursing note reviewed.   Constitutional:       General: She is not in acute distress.     Appearance: She is obese. She is ill-appearing.   Cardiovascular:      Rate and Rhythm: Normal rate.   Pulmonary:      Effort: Pulmonary effort is normal. No respiratory distress.   Neurological:      Mental Status: She is alert and oriented to person, place, and time.      Motor: Weakness present.   Psychiatric:         Behavior: Behavior normal.         Thought Content: Thought content normal.         Significant Labs: All pertinent labs within the  "past 24 hours have been reviewed.    Significant Imaging: I have reviewed all pertinent imaging results/findings within the past 24 hours.      Assessment/Plan:      * Dialysis patient  See CKD4      Pressure injury of right buttock, stage 3  Would care consulted  Appreciate recs      Urinary tract infection due to ESBL Klebsiella  See above      Anemia of chronic disease  stable      Secondary hypertension  Proteinuria on labs  Added lisinopril 10mg 1/24      Debility  PT/OT consulted  SNF recommended; pt wants OSNF  -- appreciate SW assistance  --placement pending     A-fib  Continue apixaban 2.5mg BID  Cont BB      ZAK (acute kidney injury)  Dialysis dependent ZAK  Nephrology consulted, appreciate recs  Suprapubic murillo declogged in ED, see UTI  Low albumin and history of nephrotic syndrome    Plan:  - Trend Cr  - Strict I&Os  - Avoid nephrotoxic agents  - Renally adjust medications  - Prealbumin low  - P/C ratio elevated 9.33  -- History of nephrotic syndrome      Normocytic anemia  At baseline on admission  Anemia of chronic disease    CBC daily  Transfuse if Hbg <7  Checking iron studies, folate, B12, and epo with HD    S/P left mastectomy  See malignant neoplasm of left breast      Requires daily assistance for activities of daily living (ADL) and comfort needs  Cont PT/OT      Malignant neoplasm of left breast, estrogen receptor positive  History of breast cancer status post radical mastectomy on 8/1/2019 on anastrozole   Stable    Continue anastrozole 1mg daily    Cervicogenic migraine  PRNs ordered      CKD stage 4 due to type 2 diabetes mellitus  Dialysis dependent    Nephrology consulted, appreciate recs  Last HD 1/25  "S/p HD session today. Pt made 3 lit urine . Will hold off on next HD session and evaluate the need for RRT on daily basis"  Labs pending  Nephrology recs pending for dialysis needs        Chronic pain  PRN multimodal pain regimen      Long term prescription opiate use   with Percocet " and butalbital-aceaminophen-caffeine  See chronic pain, migraines      Morbid obesity due to excess calories  Body mass index is 47.12 kg/m². Morbid obesity complicates all aspects of disease management from diagnostic modalities to treatment. Weight loss encouraged and health benefits explained to patient.     RD consulted  1500 calorie diabetic diet  BOOST    Suprapubic catheter  See neurogenic bladder      Uncontrolled type 2 diabetes mellitus with stage 4 chronic kidney disease, with long-term current use of insulin  A1c (1/4/2022) 5.4%    Diabetic diet  Continue to monitor  LDSSI  POCT BG    Major depressive disorder, recurrent episode, moderate  Not on any home medications    Continue to monitor  Consider psych consult if worsening affect        Neurogenic bladder  Continue oxybutynin 10mg daily, furosemide 160mg BID  Suprapubic catheter      Recurrent urinary tract infection  History of ESBL and MDRO with current UTI symptoms, dirty UA, and suprapubic catheter.    Plan:  - consult ID, appreciate recs  --  Plan on 3d of Meropenem (D3/3) then DC on Bactrim DS qod x 3 doses  -- Consider cont meropenem vs Bactrim if patient goes to OSNF  -- Exchange murillo cath after abx are complete  - UCx ESBL    I/D Recs as follows:  Plan: 1. Continue Meropenem x 7d total (from SP change 1/23 as urine still cloudy) - eoc 1/30 2. Rec SNF placement to complete IV abx 3. Contact isolation given prior ESBL 4. weekl CBC and CMP while on meropene 5. Discussed with ID staff 6. Needs urology close fu after dc 7. Will sign off      With end date of antibiotics 1/30/22          Hyponatremia  Chronic hyponatremia 130 baseline, 126 on admission  Secondary to fluid overload, UTI  Nephro consulted for HD  Na improved      VIJAYA (obstructive sleep apnea)  CPAP QHS      Hyperlipidemia associated with type 2 diabetes mellitus  Continue atorvastatin 80mg daily, gemfibrozil 600mg MWF      Fibromyalgia  PT/OT  Pain  regimen      Hypothyroidism  Continue levothyroxine 50mcg daily        VTE Risk Mitigation (From admission, onward)         Ordered     heparin (porcine) injection 1,000 Units  As needed (PRN)         01/24/22 0828     apixaban tablet 2.5 mg  2 times daily         01/21/22 1609     IP VTE HIGH RISK PATIENT  Once         01/21/22 1609     Place sequential compression device  Until discontinued         01/21/22 1609                      I have assessed these finding virtually using telemed platform and with assistance of bedside nurse                 The attending portion of this evaluation, treatment, and documentation was performed per Gilda Sun NP via Telemedicine AudioVisual using the secure [email or web address] software platform with 2 way audio/video. The provider was located off-site and the patient is located in the hospital. The aforementioned video software was utilized to document the relevant history and physical exam    Gilda Sun NP  Department of Hospital Medicine   [facility]

## 2022-01-27 NOTE — PLAN OF CARE
CHRIS informed Cathy with [facility] ([phone]) that pt is current at [facility] on [address] for HD (MWF 6:30am). Cathy is concerned pt may be in her copay days for SNF and if so, she would need upfront payment from pt/family. CHRIS requested Cathy find out SNF day status and provide update.    Katie Gibson, GAETANOW  PRN

## 2022-01-27 NOTE — PLAN OF CARE
PT is AAO x 4, bedrest, and observed to be in a stable condition. Pain meds, and POC reviewed with the patient. All concerns were addressed and safety was maintained

## 2022-01-27 NOTE — ASSESSMENT & PLAN NOTE
History of ESBL and MDRO with current UTI symptoms, dirty UA, and suprapubic catheter.    Plan:  - consult ID, appreciate recs  --  Plan on 3d of Meropenem (D3/3) then DC on Bactrim DS qod x 3 doses  -- Consider cont meropenem vs Bactrim if patient goes to OSNF  -- Exchange murillo cath after abx are complete  - UCx ESBL    I/D Recs as follows:  Plan: 1. Continue Meropenem x 7d total (from SP change 1/23 as urine still cloudy) - eoc 1/30 2. Rec SNF placement to complete IV abx 3. Contact isolation given prior ESBL 4. weekl CBC and CMP while on meropene 5. Discussed with ID staff 6. Needs urology close fu after dc 7. Will sign off      With end date of antibiotics 1/30/22

## 2022-01-27 NOTE — ASSESSMENT & PLAN NOTE
PT/OT consulted  SNF recommended; pt wants OSNF  -- appreciate SW assistance  --placement pending

## 2022-01-27 NOTE — ASSESSMENT & PLAN NOTE
"Dialysis dependent    Nephrology consulted, appreciate recs  Last HD 1/25  "S/p HD session today. Pt made 3 lit urine . Will hold off on next HD session and evaluate the need for RRT on daily basis"  Labs pending      "

## 2022-01-28 LAB
ANION GAP SERPL CALC-SCNC: 8 MMOL/L (ref 8–16)
BASOPHILS # BLD AUTO: 0.05 K/UL (ref 0–0.2)
BASOPHILS NFR BLD: 0.6 % (ref 0–1.9)
BUN SERPL-MCNC: 26 MG/DL (ref 8–23)
CALCIUM SERPL-MCNC: 8.4 MG/DL (ref 8.7–10.5)
CHLORIDE SERPL-SCNC: 89 MMOL/L (ref 95–110)
CO2 SERPL-SCNC: 28 MMOL/L (ref 23–29)
CREAT SERPL-MCNC: 2.1 MG/DL (ref 0.5–1.4)
DIFFERENTIAL METHOD: ABNORMAL
EOSINOPHIL # BLD AUTO: 0.4 K/UL (ref 0–0.5)
EOSINOPHIL NFR BLD: 4.4 % (ref 0–8)
ERYTHROCYTE [DISTWIDTH] IN BLOOD BY AUTOMATED COUNT: 14.5 % (ref 11.5–14.5)
EST. GFR  (AFRICAN AMERICAN): 27.1 ML/MIN/1.73 M^2
EST. GFR  (NON AFRICAN AMERICAN): 23.5 ML/MIN/1.73 M^2
GLUCOSE SERPL-MCNC: 146 MG/DL (ref 70–110)
HCT VFR BLD AUTO: 24.5 % (ref 37–48.5)
HGB BLD-MCNC: 7.9 G/DL (ref 12–16)
IMM GRANULOCYTES # BLD AUTO: 0.17 K/UL (ref 0–0.04)
IMM GRANULOCYTES NFR BLD AUTO: 2.2 % (ref 0–0.5)
LYMPHOCYTES # BLD AUTO: 2.4 K/UL (ref 1–4.8)
LYMPHOCYTES NFR BLD: 30.8 % (ref 18–48)
MAGNESIUM SERPL-MCNC: 1.5 MG/DL (ref 1.6–2.6)
MCH RBC QN AUTO: 28.6 PG (ref 27–31)
MCHC RBC AUTO-ENTMCNC: 32.2 G/DL (ref 32–36)
MCV RBC AUTO: 89 FL (ref 82–98)
MONOCYTES # BLD AUTO: 0.8 K/UL (ref 0.3–1)
MONOCYTES NFR BLD: 10.3 % (ref 4–15)
NEUTROPHILS # BLD AUTO: 4.1 K/UL (ref 1.8–7.7)
NEUTROPHILS NFR BLD: 51.7 % (ref 38–73)
NRBC BLD-RTO: 0 /100 WBC
PHOSPHATE SERPL-MCNC: 5.3 MG/DL (ref 2.7–4.5)
PLATELET # BLD AUTO: 239 K/UL (ref 150–450)
PMV BLD AUTO: 9.1 FL (ref 9.2–12.9)
POCT GLUCOSE: 133 MG/DL (ref 70–110)
POCT GLUCOSE: 152 MG/DL (ref 70–110)
POCT GLUCOSE: 182 MG/DL (ref 70–110)
POCT GLUCOSE: 185 MG/DL (ref 70–110)
POTASSIUM SERPL-SCNC: 3.8 MMOL/L (ref 3.5–5.1)
RBC # BLD AUTO: 2.76 M/UL (ref 4–5.4)
SODIUM SERPL-SCNC: 125 MMOL/L (ref 136–145)
WBC # BLD AUTO: 7.88 K/UL (ref 3.9–12.7)

## 2022-01-28 PROCEDURE — 97535 SELF CARE MNGMENT TRAINING: CPT | Mod: HCNC,CO

## 2022-01-28 PROCEDURE — 85025 COMPLETE CBC W/AUTO DIFF WBC: CPT | Mod: HCNC

## 2022-01-28 PROCEDURE — 25000003 PHARM REV CODE 250: Mod: HCNC | Performed by: STUDENT IN AN ORGANIZED HEALTH CARE EDUCATION/TRAINING PROGRAM

## 2022-01-28 PROCEDURE — 27000207 HC ISOLATION: Mod: HCNC

## 2022-01-28 PROCEDURE — 97110 THERAPEUTIC EXERCISES: CPT | Mod: HCNC,CQ

## 2022-01-28 PROCEDURE — 90935 HEMODIALYSIS ONE EVALUATION: CPT | Mod: HCNC

## 2022-01-28 PROCEDURE — 63600175 PHARM REV CODE 636 W HCPCS: Mod: HCNC | Performed by: STUDENT IN AN ORGANIZED HEALTH CARE EDUCATION/TRAINING PROGRAM

## 2022-01-28 PROCEDURE — 83735 ASSAY OF MAGNESIUM: CPT | Mod: HCNC

## 2022-01-28 PROCEDURE — 36415 COLL VENOUS BLD VENIPUNCTURE: CPT | Mod: HCNC

## 2022-01-28 PROCEDURE — 94760 N-INVAS EAR/PLS OXIMETRY 1: CPT | Mod: HCNC

## 2022-01-28 PROCEDURE — 80048 BASIC METABOLIC PNL TOTAL CA: CPT | Mod: HCNC

## 2022-01-28 PROCEDURE — 25000003 PHARM REV CODE 250: Mod: HCNC | Performed by: NURSE PRACTITIONER

## 2022-01-28 PROCEDURE — 80100014 HC HEMODIALYSIS 1:1: Mod: HCNC

## 2022-01-28 PROCEDURE — 11000001 HC ACUTE MED/SURG PRIVATE ROOM: Mod: HCNC

## 2022-01-28 PROCEDURE — 63600175 PHARM REV CODE 636 W HCPCS: Mod: HCNC | Performed by: NURSE PRACTITIONER

## 2022-01-28 PROCEDURE — 97530 THERAPEUTIC ACTIVITIES: CPT | Mod: HCNC,CQ

## 2022-01-28 PROCEDURE — 63600175 PHARM REV CODE 636 W HCPCS: Mod: HCNC | Performed by: PHYSICIAN ASSISTANT

## 2022-01-28 PROCEDURE — 25000003 PHARM REV CODE 250: Mod: HCNC

## 2022-01-28 PROCEDURE — 97530 THERAPEUTIC ACTIVITIES: CPT | Mod: HCNC,CO

## 2022-01-28 PROCEDURE — 84100 ASSAY OF PHOSPHORUS: CPT | Mod: HCNC

## 2022-01-28 RX ORDER — HEPARIN SODIUM 1000 [USP'U]/ML
1000 INJECTION, SOLUTION INTRAVENOUS; SUBCUTANEOUS
Status: DISCONTINUED | OUTPATIENT
Start: 2022-01-28 | End: 2022-02-03

## 2022-01-28 RX ORDER — MUPIROCIN 20 MG/G
OINTMENT TOPICAL 2 TIMES DAILY
Status: ACTIVE | OUTPATIENT
Start: 2022-01-28 | End: 2022-02-02

## 2022-01-28 RX ORDER — FUROSEMIDE 80 MG/1
160 TABLET ORAL 2 TIMES DAILY
Status: DISCONTINUED | OUTPATIENT
Start: 2022-01-28 | End: 2022-02-05 | Stop reason: HOSPADM

## 2022-01-28 RX ORDER — FUROSEMIDE 80 MG/1
80 TABLET ORAL 2 TIMES DAILY
Status: DISCONTINUED | OUTPATIENT
Start: 2022-01-28 | End: 2022-01-28

## 2022-01-28 RX ORDER — MAGNESIUM SULFATE 1 G/100ML
1 INJECTION INTRAVENOUS ONCE
Status: COMPLETED | OUTPATIENT
Start: 2022-01-28 | End: 2022-01-28

## 2022-01-28 RX ORDER — SODIUM CHLORIDE 9 MG/ML
INJECTION, SOLUTION INTRAVENOUS ONCE
Status: DISCONTINUED | OUTPATIENT
Start: 2022-01-28 | End: 2022-02-03

## 2022-01-28 RX ADMIN — POLYETHYLENE GLYCOL 3350 17 G: 17 POWDER, FOR SOLUTION ORAL at 08:01

## 2022-01-28 RX ADMIN — SUMATRIPTAN SUCCINATE 100 MG: 50 TABLET ORAL at 08:01

## 2022-01-28 RX ADMIN — OXYCODONE HYDROCHLORIDE 10 MG: 10 TABLET ORAL at 09:01

## 2022-01-28 RX ADMIN — GEMFIBROZIL 600 MG: 600 TABLET ORAL at 06:01

## 2022-01-28 RX ADMIN — APIXABAN 2.5 MG: 2.5 TABLET, FILM COATED ORAL at 08:01

## 2022-01-28 RX ADMIN — MAGNESIUM SULFATE HEPTAHYDRATE 1 G: 500 INJECTION, SOLUTION INTRAMUSCULAR; INTRAVENOUS at 12:01

## 2022-01-28 RX ADMIN — ANASTROZOLE 1 MG: 1 TABLET, COATED ORAL at 09:01

## 2022-01-28 RX ADMIN — VENLAFAXINE HYDROCHLORIDE 150 MG: 75 CAPSULE, EXTENDED RELEASE ORAL at 09:01

## 2022-01-28 RX ADMIN — ATORVASTATIN CALCIUM 80 MG: 20 TABLET, FILM COATED ORAL at 09:01

## 2022-01-28 RX ADMIN — APIXABAN 2.5 MG: 2.5 TABLET, FILM COATED ORAL at 09:01

## 2022-01-28 RX ADMIN — MEROPENEM AND SODIUM CHLORIDE 1 G: 1 INJECTION, SOLUTION INTRAVENOUS at 11:01

## 2022-01-28 RX ADMIN — OXYBUTYNIN CHLORIDE 10 MG: 10 TABLET, FILM COATED, EXTENDED RELEASE ORAL at 09:01

## 2022-01-28 RX ADMIN — METOPROLOL TARTRATE 25 MG: 25 TABLET, FILM COATED ORAL at 08:01

## 2022-01-28 RX ADMIN — FAMOTIDINE 20 MG: 20 TABLET ORAL at 09:01

## 2022-01-28 RX ADMIN — FUROSEMIDE 80 MG: 80 TABLET ORAL at 09:01

## 2022-01-28 RX ADMIN — ACETAMINOPHEN 650 MG: 325 TABLET ORAL at 04:01

## 2022-01-28 RX ADMIN — BUTALBITAL, ACETAMINOPHEN, AND CAFFEINE 1 TABLET: 50; 325; 40 TABLET ORAL at 12:01

## 2022-01-28 RX ADMIN — SENNOSIDES AND DOCUSATE SODIUM 1 TABLET: 50; 8.6 TABLET ORAL at 08:01

## 2022-01-28 RX ADMIN — Medication 6 MG: at 08:01

## 2022-01-28 RX ADMIN — HEPARIN SODIUM 1000 UNITS: 1000 INJECTION, SOLUTION INTRAVENOUS; SUBCUTANEOUS at 04:01

## 2022-01-28 RX ADMIN — CLONIDINE HYDROCHLORIDE 0.2 MG: 0.2 TABLET ORAL at 08:01

## 2022-01-28 RX ADMIN — CLONIDINE HYDROCHLORIDE 0.2 MG: 0.2 TABLET ORAL at 06:01

## 2022-01-28 RX ADMIN — FUROSEMIDE 160 MG: 80 TABLET ORAL at 08:01

## 2022-01-28 RX ADMIN — SENNOSIDES AND DOCUSATE SODIUM 1 TABLET: 50; 8.6 TABLET ORAL at 09:01

## 2022-01-28 RX ADMIN — LISINOPRIL 10 MG: 10 TABLET ORAL at 09:01

## 2022-01-28 RX ADMIN — POLYETHYLENE GLYCOL 3350 17 G: 17 POWDER, FOR SOLUTION ORAL at 09:01

## 2022-01-28 RX ADMIN — MUPIROCIN: 20 OINTMENT TOPICAL at 08:01

## 2022-01-28 RX ADMIN — METOPROLOL TARTRATE 25 MG: 25 TABLET, FILM COATED ORAL at 09:01

## 2022-01-28 RX ADMIN — CLONIDINE HYDROCHLORIDE 0.2 MG: 0.2 TABLET ORAL at 09:01

## 2022-01-28 RX ADMIN — LEVOTHYROXINE SODIUM 50 MCG: 50 TABLET ORAL at 06:01

## 2022-01-28 NOTE — PLAN OF CARE
[facility]  Discharge Reassessment    Primary Care Provider: Kailey Navarro MD    Expected Discharge Date: 1/31/2022    Reassessment (most recent)     Discharge Reassessment - 01/28/22 1441        Discharge Reassessment    Assessment Type Discharge Planning Reassessment     Did the patient's condition or plan change since previous assessment? No     Discharge Plan discussed with: Patient     Discharge Plan A Skilled Nursing Facility     Discharge Plan B Home Health     DME Needed Upon Discharge  other (see comments)   TBD    Why the patient remains in the hospital Placement issues        Post-Acute Status    Post-Acute Authorization Placement     Post-Acute Placement Status Referrals Sent               Marisol Tate, RN  Ext [phone]

## 2022-01-28 NOTE — PROGRESS NOTES
[facility] - [facility]  Nephrology  Progress Note    Patient Name: Cecile Bowen  MRN: [id number]  Admission Date: 1/21/2022  Hospital Length of Stay: 3 days  Attending Provider: Dorothy Kraus MD   Primary Care Physician: Kailey Navarro MD  Principal Problem:Dialysis patient    Subjective:     HPI: 71 year old female with dialysis dependent ZAK here because she felt bad and could get to her dialysis unit. She says she couldn't get to dialysis because she couldn't arrange transportation. Last HD 1/17/22.  Nephrology consulted for ESKD.      Interval History: not properly diluting urine or more likely drinking too much water    Review of patient's allergies indicates:  No Known Allergies  Current Facility-Administered Medications   Medication Frequency    0.9%  NaCl infusion Once    0.9%  NaCl infusion Once    acetaminophen tablet 650 mg Q4H PRN    anastrozole tablet 1 mg Daily    apixaban tablet 2.5 mg BID    atorvastatin tablet 80 mg Daily    butalbital-acetaminophen-caffeine -40 mg per tablet 1 tablet Daily PRN    cloNIDine tablet 0.2 mg TID    dextrose 50% injection 12.5 g PRN    dextrose 50% injection 25 g PRN    epoetin chloe-epbx injection 6,000 Units Every Tues, Thurs, Sat    ergocalciferol capsule 50,000 Units Q7 Days    famotidine tablet 20 mg Daily    furosemide tablet 160 mg BID    gemfibroziL tablet 600 mg Every Mon, Wed, Fri    glucagon (human recombinant) injection 1 mg PRN    glucose chewable tablet 16 g PRN    glucose chewable tablet 24 g PRN    heparin (porcine) injection 1,000 Units PRN    heparin (porcine) injection 1,000 Units PRN    influenza (QUADRIVALENT ADJUVANTED PF) vaccine 0.5 mL vaccine x 1 dose    insulin aspart U-100 pen 0-5 Units QID (AC + HS) PRN    levothyroxine tablet 50 mcg Before breakfast    lisinopriL tablet 10 mg Daily    melatonin tablet 6 mg Nightly PRN    meropenem-0.9% sodium chloride 1 g/50 mL IVPB Q24H    methocarbamoL tablet 750 mg  TID PRN    metoprolol tartrate (LOPRESSOR) tablet 25 mg BID    mupirocin 2 % ointment BID    ondansetron injection 4 mg Q8H PRN    oxybutynin 24 hr tablet 10 mg Daily    oxyCODONE immediate release tablet Tab 10 mg Q4H PRN    polyethylene glycol packet 17 g BID    senna-docusate 8.6-50 mg per tablet 1 tablet BID    sodium chloride 0.9% bolus 250 mL PRN    sodium chloride 0.9% bolus 250 mL PRN    sodium chloride 0.9% flush 10 mL PRN    sumatriptan tablet 100 mg BID PRN    venlafaxine 24 hr capsule 150 mg Daily       Objective:     Vital Signs (Most Recent):  Temp: 98 °F (36.7 °C) (01/28/22 1249)  Pulse: 78 (01/28/22 1249)  Resp: 16 (01/28/22 1249)  BP: (!) 159/74 (01/28/22 1249)  SpO2: (!) 93 % (01/28/22 1249)  O2 Device (Oxygen Therapy): room air (01/27/22 1852) Vital Signs (24h Range):  Temp:  [96.1 °F (35.6 °C)-98 °F (36.7 °C)] 98 °F (36.7 °C)  Pulse:  [67-78] 78  Resp:  [16-20] 16  SpO2:  [92 %-98 %] 93 %  BP: (127-189)/(62-88) 159/74     Weight: 120.7 kg (265 lb 15.8 oz) (01/22/22 1919)  Body mass index is 47.12 kg/m².  Body surface area is 2.32 meters squared.    I/O last 3 completed shifts:  In: 740 [P.O.:640; IV Piggyback:100]  Out: 3900 [Urine:3900]    Physical Exam  Constitutional:       General: She is not in acute distress.     Appearance: She is obese.   HENT:      Mouth/Throat:      Mouth: Mucous membranes are moist.   Eyes:      General: No scleral icterus.     Extraocular Movements: Extraocular movements intact.      Pupils: Pupils are equal, round, and reactive to light.   Cardiovascular:      Rate and Rhythm: Normal rate and regular rhythm.   Pulmonary:      Effort: Pulmonary effort is normal. No respiratory distress.   Abdominal:      General: There is no distension.      Palpations: Abdomen is soft.   Musculoskeletal:         General: Deformity (RUE TDC) present.      Right lower leg: Edema present.      Left lower leg: Edema present.   Skin:     Coloration: Skin is not jaundiced.    Neurological:      General: No focal deficit present.      Mental Status: She is alert.         Significant Labs:  All labs within the past 24 hours have been reviewed.     Significant Imaging:  Labs: Reviewed    Assessment/Plan:     ZAK (acute kidney injury)  -possibly recovering  -dialysis dependent ZAK secondary to septic shock 1 month prior  -MIKKI, TOSHIA TDC, , still makes lots of urine, [facility] dialysis unit Dr. Hurst  -murillo catheter was declogged in ED with lots of urine output  -significant LE edema and hyponatremia from water intake  -seen on HD today without issue  -made 2.5L urine, significant worsening hyponatremia betweem sessions  -please continue furosemide 160 bid    Anemia of chronic disease  -epo TIW  -IV iron outpatient    Chronic kidney disease-mineral and bone disorder  -start vit d 2000 daily    Debility  -per primary    Hyponatremia  -possibly from innability to concentrate the urine but most likely from excess water intake  -ordered urine sodium and osm        Emmanuel Hare MD  Nephrology  [facility]

## 2022-01-28 NOTE — PROGRESS NOTES
Patient arrived via bed.  Pt. Awake, alert and responsive.  VSS.  No distress noted.  HD TX 1:1  started via RIJ HD catheter.  Isolation precautions maintained.

## 2022-01-28 NOTE — NURSING
PT pulled her IV out by mistake. All attempts to start anew Iv site proved abortive. Will pass on to morning shift to call Picc team in the A.M for midline.s

## 2022-01-28 NOTE — ASSESSMENT & PLAN NOTE
-possibly recovering  -dialysis dependent ZAK secondary to septic shock 1 month prior  -MIKKI, TOSHIA TDC, , still makes lots of urine, [facility] dialysis unit Dr. Hurst  -murillo catheter was declogged in ED with lots of urine output  -significant LE edema and hyponatremia from water intake  -seen on HD today without issue  -made 2.5L urine, significant worsening hyponatremia betweem sessions  -please continue furosemide 160 bid

## 2022-01-28 NOTE — ASSESSMENT & PLAN NOTE
-possibly from innability to concentrate the urine but most likely from excess water intake  -ordered urine sodium and osm

## 2022-01-28 NOTE — SUBJECTIVE & OBJECTIVE
Interval History: not properly diluting urine or more likely drinking too much water    Review of patient's allergies indicates:  No Known Allergies  Current Facility-Administered Medications   Medication Frequency    0.9%  NaCl infusion Once    0.9%  NaCl infusion Once    acetaminophen tablet 650 mg Q4H PRN    anastrozole tablet 1 mg Daily    apixaban tablet 2.5 mg BID    atorvastatin tablet 80 mg Daily    butalbital-acetaminophen-caffeine -40 mg per tablet 1 tablet Daily PRN    cloNIDine tablet 0.2 mg TID    dextrose 50% injection 12.5 g PRN    dextrose 50% injection 25 g PRN    epoetin chloe-epbx injection 6,000 Units Every Tues, Thurs, Sat    ergocalciferol capsule 50,000 Units Q7 Days    famotidine tablet 20 mg Daily    furosemide tablet 160 mg BID    gemfibroziL tablet 600 mg Every Mon, Wed, Fri    glucagon (human recombinant) injection 1 mg PRN    glucose chewable tablet 16 g PRN    glucose chewable tablet 24 g PRN    heparin (porcine) injection 1,000 Units PRN    heparin (porcine) injection 1,000 Units PRN    influenza (QUADRIVALENT ADJUVANTED PF) vaccine 0.5 mL vaccine x 1 dose    insulin aspart U-100 pen 0-5 Units QID (AC + HS) PRN    levothyroxine tablet 50 mcg Before breakfast    lisinopriL tablet 10 mg Daily    melatonin tablet 6 mg Nightly PRN    meropenem-0.9% sodium chloride 1 g/50 mL IVPB Q24H    methocarbamoL tablet 750 mg TID PRN    metoprolol tartrate (LOPRESSOR) tablet 25 mg BID    mupirocin 2 % ointment BID    ondansetron injection 4 mg Q8H PRN    oxybutynin 24 hr tablet 10 mg Daily    oxyCODONE immediate release tablet Tab 10 mg Q4H PRN    polyethylene glycol packet 17 g BID    senna-docusate 8.6-50 mg per tablet 1 tablet BID    sodium chloride 0.9% bolus 250 mL PRN    sodium chloride 0.9% bolus 250 mL PRN    sodium chloride 0.9% flush 10 mL PRN    sumatriptan tablet 100 mg BID PRN    venlafaxine 24 hr capsule 150 mg Daily       Objective:     Vital  Signs (Most Recent):  Temp: 98 °F (36.7 °C) (01/28/22 1249)  Pulse: 78 (01/28/22 1249)  Resp: 16 (01/28/22 1249)  BP: (!) 159/74 (01/28/22 1249)  SpO2: (!) 93 % (01/28/22 1249)  O2 Device (Oxygen Therapy): room air (01/27/22 1852) Vital Signs (24h Range):  Temp:  [96.1 °F (35.6 °C)-98 °F (36.7 °C)] 98 °F (36.7 °C)  Pulse:  [67-78] 78  Resp:  [16-20] 16  SpO2:  [92 %-98 %] 93 %  BP: (127-189)/(62-88) 159/74     Weight: 120.7 kg (265 lb 15.8 oz) (01/22/22 1919)  Body mass index is 47.12 kg/m².  Body surface area is 2.32 meters squared.    I/O last 3 completed shifts:  In: 740 [P.O.:640; IV Piggyback:100]  Out: 3900 [Urine:3900]    Physical Exam  Constitutional:       General: She is not in acute distress.     Appearance: She is obese.   HENT:      Mouth/Throat:      Mouth: Mucous membranes are moist.   Eyes:      General: No scleral icterus.     Extraocular Movements: Extraocular movements intact.      Pupils: Pupils are equal, round, and reactive to light.   Cardiovascular:      Rate and Rhythm: Normal rate and regular rhythm.   Pulmonary:      Effort: Pulmonary effort is normal. No respiratory distress.   Abdominal:      General: There is no distension.      Palpations: Abdomen is soft.   Musculoskeletal:         General: Deformity (RUE TDC) present.      Right lower leg: Edema present.      Left lower leg: Edema present.   Skin:     Coloration: Skin is not jaundiced.   Neurological:      General: No focal deficit present.      Mental Status: She is alert.         Significant Labs:  All labs within the past 24 hours have been reviewed.     Significant Imaging:  Labs: Reviewed

## 2022-01-28 NOTE — PLAN OF CARE
Pt resting in bed and observed to be in stable condition. At this time, pt has no IV access and still on room air. POC reviewed with the patient and encouraged to always to call for help.

## 2022-01-28 NOTE — PT/OT/SLP PROGRESS
Physical Therapy Treatment    Patient Name:  Cecile Bowen   MRN:  [id number]    Recommendations:     Discharge Recommendations:  nursing facility, skilled   Discharge Equipment Recommendations: none   Barriers to discharge: Decreased caregiver support    Assessment:     Cecile Bowen is a 71 y.o. female admitted with a medical diagnosis of Dialysis patient.  She presents with the following impairments/functional limitations:  weakness,impaired endurance,impaired functional mobilty,impaired self care skills,decreased lower extremity function,impaired balance,pain,decreased ROM. Pt continues to require significant assistance at this time, and would continue to benefit from skilled PT services to improve all deficits noted above. Resume PT POC as indicated.    Rehab Prognosis: Good; patient would benefit from acute skilled PT services to address these deficits and reach maximum level of function.    Recent Surgery: * No surgery found *      Plan:     During this hospitalization, patient to be seen 3 x/week to address the identified rehab impairments via gait training,therapeutic activities,therapeutic exercises and progress toward the following goals:    · Plan of Care Expires:  02/22/22    Subjective     Chief Complaint: none stated  Patient/Family Comments/goals: none stated  Pain/Comfort:  · Pain Rating 1:  (Pt did not rate.)  · Location - Orientation 1: generalized  · Location 1:  (neck)  · Pain Addressed 1: Reposition  · Pain Rating Post-Intervention 1:  (Pt did not rate.)      Objective:     Communicated with nursiing prior to session.  Patient found supine with  (all lines intact) upon PT entry to room.     General Precautions: Standard, fall,contact   Orthopedic Precautions:N/A   Braces: N/A  Respiratory Status: Room air     Functional Mobility:  · Bed Mobility:  Rolling Left:  moderate assistance  · Supine to Sit: moderate assistance  · Sit to Supine: moderate assistance  · Transfers:  Patient completed Sit  <> Stand Transfer with maximal assistance and of 2 persons  with  hand-held assist  x2 trials from EOB, min cues for proper body mechanics, (B) knees blocked, (B)HHA, Max(A)x2; x2 trials from EOB using RW-- incomplete, unable to clear buttocks due to difficulty with (B) knee extension.      AM-PAC 6 CLICK MOBILITY  Turning over in bed (including adjusting bedclothes, sheets and blankets)?: 3  Sitting down on and standing up from a chair with arms (e.g., wheelchair, bedside commode, etc.): 2  Moving from lying on back to sitting on the side of the bed?: 3  Moving to and from a bed to a chair (including a wheelchair)?: 1  Need to walk in hospital room?: 1  Climbing 3-5 steps with a railing?: 1  Basic Mobility Total Score: 11       Therapeutic Activities and Exercises:   -BLE therex 2x10 reps: LAQ,HF,GS    Patient left supine with all lines intact, call button in reach and nursing present..    GOALS:   Multidisciplinary Problems     Physical Therapy Goals        Problem: Physical Therapy Goal    Goal Priority Disciplines Outcome Goal Variances Interventions   Physical Therapy Goal     PT, PT/OT Ongoing, Progressing     Description: Goals to be met by: 22    Patient will increase functional independence with mobility by performin. Supine to sit with MInimal Assistance  2. Sit to supine with MInimal Assistance  3. Sit to stand transfer with Maximum Assistance  4.Patient will demonstrate independence with a home exercise program  5. Bed to chair transfer with Contact Guard Assistance using Slideboard                   Time Tracking:     PT Received On: 22  PT Start Time: 944     PT Stop Time: 1010  PT Total Time (min): 26 min     Billable Minutes: Therapeutic Activity 18 and Therapeutic Exercise 8    Treatment Type: Treatment  PT/PTA: PTA     PTA Visit Number: 2     2022

## 2022-01-28 NOTE — PT/OT/SLP PROGRESS
"Occupational Therapy   Co-Treatment with Physical Therapy    Name: Cecile Bowen  MRN: [id number]  Admitting Diagnosis:  Dialysis patient      Pre-op Diagnosis: * No surgery found *    Recommendations:     Discharge Recommendations: nursing facility, skilled  Discharge Equipment Recommendations:  none  Barriers to discharge:   (requires increased assist)    Assessment:     Cecile Bowen is a 71 y.o. female with a medical diagnosis of Dialysis patient.  She presents with the following performance deficits affecting function are weakness,impaired endurance,impaired self care skills,impaired functional mobilty,impaired balance,pain,decreased ROM,decreased upper extremity function,decreased lower extremity function.   Patient agreeable to OT session this day and tolerated fair secondary to fatigue and c/o back pain. Patient completed functional t/f sit<>stand x2 trials using (B)HHA and (B) knees blocked with Max(A)x2. Patient would benefit from continued OT services to address deficits and progress towards goals. Continue OT POC.     Rehab Prognosis:  Good; patient would benefit from acute skilled OT services to address these deficits and reach maximum level of function.       Plan:     Patient to be seen 3 x/week to address the above listed problems via self-care/home management,therapeutic activities,therapeutic exercises  · Plan of Care Expires: 02/22/22  · Plan of Care Reviewed with: patient    Subjective   "Oh! My back!"  "I didn't get much sleep last night with all the interruptions."    Pain/Comfort:  · Pain Rating 1: other (see comments) (Pt did not rate)  · Location - Orientation 1: generalized  · Location 1: back  · Pain Addressed 1: Reposition,Distraction,Nurse notified  · Pain Rating Post-Intervention 1: other (see comments) (Pt did not rate)    Objective:     Communicated with: RN prior to session.  Patient found HOB elevated with telemetry (suprapubic catheter) upon OT entry to room.    General " Precautions: Standard, contact,fall   Orthopedic Precautions:N/A   Braces: N/A  Respiratory Status: Room air     Occupational Performance:     Bed Mobility:    · Patient completed Rolling/Turning to Left with  moderate assistance, with side rail and HOB elevated and slightly BLE management  · Patient completed Scooting/Bridging with stand by assistance  · Patient completed Supine to Sit with moderate assistance, with side rail and HOB elevated with BLE management and some trunk support to (A) with midline  · Patient completed Sit to Supine with moderate assistance, with side rail and HOB flat with BLE management     Functional Mobility/Transfers:  · Patient completed Sit <> Stand Transfer with maximal assistance and of 2 persons  with  hand-held assist    · x2 trials from EOB, min cues for proper body mechanics, (B) knees blocked, (B)HHA, Max(A)x2  · x2 trials from EOB using RW-- incomplete, unable to clear buttocks due to difficulty with (B) knee extension.     Activities of Daily Living:  · Grooming: stand by assistance for balance seated EOB to complete hair brushing and face hygiene  · Toileting: total assistance for completing posterior pericare in stance; tolerated ~1 min standing with Max(A)x2; RN present to assist and finish pericare and donning brief using log roll      [facility] 6 Click ADL: 16    Treatment & Education:  Instruction and facilitation in safe transfer techniques including proper body mechanics, weight shifting, and hand placement  Addressed all patient questions within SUZI scope of practice.   Co-treatment performed with PTA due to patient's complexity and benefit of 2 skilled therapists to facilitate functional and safe occupational performance, accommodate patient's activity tolerance, and maximize patient's participation in therapy.     Patient left left sidelying with all lines intact, call button in reach and RN presentEducation:      GOALS:   Multidisciplinary Problems     Occupational  Therapy Goals        Problem: Occupational Therapy Goal    Goal Priority Disciplines Outcome Interventions   Occupational Therapy Goal     OT, PT/OT Ongoing, Progressing    Description: Goals to be met by: 02-03-22     Patient will increase functional independence with ADLs by performing:    UE Dressing with Set-up Assistance.  Grooming while seated with Set-up Assistance.  Toileting from bedside commode with Moderate Assistance for hygiene and clothing management using drop arm commode and slide board  Sitting at edge of bed x15 minutes with Modified Ada.  Supine to sit with Minimal Assistance.  Slide board  transfers with Minimal Assistance.  Pt. To be I with HEP to BUE to improve level of endurance (RUE with RTC injury)  Pt. To perform sit to stand from EOB with Mod A                     Time Tracking:     OT Date of Treatment: 01/28/22  OT Start Time: 1022  OT Stop Time: 1050  OT Total Time (min): 28 min    Billable Minutes:Self Care/Home Management 10  Therapeutic Activity 18    OT/SUZI: SUZI ARCHER Visit Number: 2    1/28/2022

## 2022-01-29 LAB
ANION GAP SERPL CALC-SCNC: 10 MMOL/L (ref 8–16)
BASOPHILS # BLD AUTO: 0.03 K/UL (ref 0–0.2)
BASOPHILS NFR BLD: 0.4 % (ref 0–1.9)
BUN SERPL-MCNC: 15 MG/DL (ref 8–23)
CALCIUM SERPL-MCNC: 7.9 MG/DL (ref 8.7–10.5)
CHLORIDE SERPL-SCNC: 95 MMOL/L (ref 95–110)
CO2 SERPL-SCNC: 25 MMOL/L (ref 23–29)
CREAT SERPL-MCNC: 1.5 MG/DL (ref 0.5–1.4)
DIFFERENTIAL METHOD: ABNORMAL
EOSINOPHIL # BLD AUTO: 0.2 K/UL (ref 0–0.5)
EOSINOPHIL NFR BLD: 3.2 % (ref 0–8)
ERYTHROCYTE [DISTWIDTH] IN BLOOD BY AUTOMATED COUNT: 14.9 % (ref 11.5–14.5)
EST. GFR  (AFRICAN AMERICAN): 40.7 ML/MIN/1.73 M^2
EST. GFR  (NON AFRICAN AMERICAN): 35.3 ML/MIN/1.73 M^2
GLUCOSE SERPL-MCNC: 138 MG/DL (ref 70–110)
HCT VFR BLD AUTO: 26.6 % (ref 37–48.5)
HGB BLD-MCNC: 8.4 G/DL (ref 12–16)
IMM GRANULOCYTES # BLD AUTO: 0.13 K/UL (ref 0–0.04)
IMM GRANULOCYTES NFR BLD AUTO: 1.7 % (ref 0–0.5)
LYMPHOCYTES # BLD AUTO: 1.6 K/UL (ref 1–4.8)
LYMPHOCYTES NFR BLD: 21.1 % (ref 18–48)
MAGNESIUM SERPL-MCNC: 1.7 MG/DL (ref 1.6–2.6)
MCH RBC QN AUTO: 29.3 PG (ref 27–31)
MCHC RBC AUTO-ENTMCNC: 31.6 G/DL (ref 32–36)
MCV RBC AUTO: 93 FL (ref 82–98)
MONOCYTES # BLD AUTO: 0.8 K/UL (ref 0.3–1)
MONOCYTES NFR BLD: 10.6 % (ref 4–15)
NEUTROPHILS # BLD AUTO: 4.8 K/UL (ref 1.8–7.7)
NEUTROPHILS NFR BLD: 63 % (ref 38–73)
NRBC BLD-RTO: 0 /100 WBC
PHOSPHATE SERPL-MCNC: 3.8 MG/DL (ref 2.7–4.5)
PLATELET # BLD AUTO: 212 K/UL (ref 150–450)
PMV BLD AUTO: 9.3 FL (ref 9.2–12.9)
POCT GLUCOSE: 141 MG/DL (ref 70–110)
POCT GLUCOSE: 158 MG/DL (ref 70–110)
POCT GLUCOSE: 180 MG/DL (ref 70–110)
POCT GLUCOSE: 204 MG/DL (ref 70–110)
POTASSIUM SERPL-SCNC: 3.6 MMOL/L (ref 3.5–5.1)
RBC # BLD AUTO: 2.87 M/UL (ref 4–5.4)
SODIUM SERPL-SCNC: 130 MMOL/L (ref 136–145)
WBC # BLD AUTO: 7.58 K/UL (ref 3.9–12.7)

## 2022-01-29 PROCEDURE — 83735 ASSAY OF MAGNESIUM: CPT | Mod: HCNC

## 2022-01-29 PROCEDURE — 25000003 PHARM REV CODE 250: Mod: HCNC | Performed by: STUDENT IN AN ORGANIZED HEALTH CARE EDUCATION/TRAINING PROGRAM

## 2022-01-29 PROCEDURE — 27000207 HC ISOLATION: Mod: HCNC

## 2022-01-29 PROCEDURE — 80048 BASIC METABOLIC PNL TOTAL CA: CPT | Mod: HCNC

## 2022-01-29 PROCEDURE — 94761 N-INVAS EAR/PLS OXIMETRY MLT: CPT | Mod: HCNC

## 2022-01-29 PROCEDURE — 11000001 HC ACUTE MED/SURG PRIVATE ROOM: Mod: HCNC

## 2022-01-29 PROCEDURE — 25000003 PHARM REV CODE 250: Mod: HCNC

## 2022-01-29 PROCEDURE — 85025 COMPLETE CBC W/AUTO DIFF WBC: CPT | Mod: HCNC

## 2022-01-29 PROCEDURE — 63600175 PHARM REV CODE 636 W HCPCS: Mod: HCNC | Performed by: PHYSICIAN ASSISTANT

## 2022-01-29 PROCEDURE — 36415 COLL VENOUS BLD VENIPUNCTURE: CPT | Mod: HCNC

## 2022-01-29 PROCEDURE — 84100 ASSAY OF PHOSPHORUS: CPT | Mod: HCNC

## 2022-01-29 PROCEDURE — 25000003 PHARM REV CODE 250: Mod: HCNC | Performed by: NURSE PRACTITIONER

## 2022-01-29 RX ADMIN — VENLAFAXINE HYDROCHLORIDE 150 MG: 75 CAPSULE, EXTENDED RELEASE ORAL at 08:01

## 2022-01-29 RX ADMIN — METOPROLOL TARTRATE 25 MG: 25 TABLET, FILM COATED ORAL at 08:01

## 2022-01-29 RX ADMIN — OXYCODONE HYDROCHLORIDE 10 MG: 10 TABLET ORAL at 04:01

## 2022-01-29 RX ADMIN — MEROPENEM AND SODIUM CHLORIDE 1 G: 1 INJECTION, SOLUTION INTRAVENOUS at 11:01

## 2022-01-29 RX ADMIN — INSULIN ASPART 2 UNITS: 100 INJECTION, SOLUTION INTRAVENOUS; SUBCUTANEOUS at 12:01

## 2022-01-29 RX ADMIN — FAMOTIDINE 20 MG: 20 TABLET ORAL at 08:01

## 2022-01-29 RX ADMIN — FUROSEMIDE 160 MG: 80 TABLET ORAL at 08:01

## 2022-01-29 RX ADMIN — SUMATRIPTAN SUCCINATE 100 MG: 50 TABLET ORAL at 09:01

## 2022-01-29 RX ADMIN — ATORVASTATIN CALCIUM 80 MG: 20 TABLET, FILM COATED ORAL at 08:01

## 2022-01-29 RX ADMIN — Medication 6 MG: at 08:01

## 2022-01-29 RX ADMIN — MUPIROCIN: 20 OINTMENT TOPICAL at 08:01

## 2022-01-29 RX ADMIN — LEVOTHYROXINE SODIUM 50 MCG: 50 TABLET ORAL at 07:01

## 2022-01-29 RX ADMIN — POLYETHYLENE GLYCOL 3350 17 G: 17 POWDER, FOR SOLUTION ORAL at 08:01

## 2022-01-29 RX ADMIN — LISINOPRIL 10 MG: 10 TABLET ORAL at 08:01

## 2022-01-29 RX ADMIN — SENNOSIDES AND DOCUSATE SODIUM 1 TABLET: 50; 8.6 TABLET ORAL at 08:01

## 2022-01-29 RX ADMIN — CLONIDINE HYDROCHLORIDE 0.2 MG: 0.2 TABLET ORAL at 01:01

## 2022-01-29 RX ADMIN — ANASTROZOLE 1 MG: 1 TABLET, COATED ORAL at 08:01

## 2022-01-29 RX ADMIN — OXYBUTYNIN CHLORIDE 10 MG: 10 TABLET, FILM COATED, EXTENDED RELEASE ORAL at 08:01

## 2022-01-29 RX ADMIN — ERGOCALCIFEROL 50000 UNITS: 1.25 CAPSULE ORAL at 08:01

## 2022-01-29 RX ADMIN — CLONIDINE HYDROCHLORIDE 0.2 MG: 0.2 TABLET ORAL at 08:01

## 2022-01-29 RX ADMIN — APIXABAN 2.5 MG: 2.5 TABLET, FILM COATED ORAL at 08:01

## 2022-01-29 NOTE — PROGRESS NOTES
HD Tx complete.  Ran for 3 hrs.    Net fluid removal of 2085.  VSS.   Tolerated dialysis well.  Report given to primary nurse.  Returned to floor via bed.

## 2022-01-29 NOTE — PROGRESS NOTES
[facility] [place] [facility]  Telemedicine Progress Note    Patient Name: Cecile Bowen  MRN: [id number]  Patient Class: IP- Inpatient   Admission Date: 1/21/2022  Length of Stay: 3 days  Attending Physician: Dorothy Kraus MD  Primary Care Provider: Kailey Navarro MD          Subjective:     Principal Problem:Dialysis patient        HPI:  Cecile Bowen maikel 71F with ESRD (on HD), DM2, HTN, breast cancer s/p mastectomy, and recurrent UTI presents with chest heaviness. Pt recently seen in ED for similar chest heaviness. She reports a productive cough and missing some medications. She has a chronic suprapubic catheter of 5 years and is bed bound at baseline, but has a wheelchair for mobility but rarely uses it. She was started on HD 2 months ago and has issues making HD appointments due to transportation. She has missed her last two appointments. Last session was Monday prior to discharge from SNF. She had been at SNF since 12/30/21 when she was admitted after a long hospital stay after being found down at home 11/28/21. During her hospital stay, she was treated for possible STEMI, worsening CKD, metabolic acidosis and required intubation for acute respiratory failure. She was discharged to [facility] and returned home on 1/17/22 after her HD session.    In the ED, she is afebrile and HD stable. Labs positive for dirty UA, hyponatremia, hypokalemia, hypochloridemia, elevated BUN, elevated Cr, and elevated BNP. She was started on Rocephin for UTI and nephrology was consulted for HD. She was admitted to hospital medicine for further management.      Overview/Hospital Course:  Patient admitted for UTI and missed dialysis appointments due to transportation issues. Patient started on Rocephin for UTI and ID was consulted for antibiotic recommendations as patient has history of ESBL and MDRO. Pt transitioned to meropenem followed by Bactrim for 3 days for ESBL UTI. Midline consult ordered  for extended abx coverage. Nephrology was consulted for HD while inpatient. Social work was consulted for discharge planning, working on SNF placement. PT/OT consulted and recommending SNF. Wound care consulted for skin breakdown with recs.     1/28 patient with significant lower extremity edema, Cr 1.8 > 2.1, Na 125. Continue furosemide 160 mg BID per nephrology recs. Per nephrology, even though good urine O/p, patient may benefit from RRT at least 2 times a week for clearances and volume removal. Session of HD planned today. Plan for SNF vs Home with HH, anticipate discharge 1/31.       Interval History: no acute events overnight. Denies complaints.   Review of Systems   Constitutional: Positive for fatigue. Negative for chills and fever.   Respiratory: Negative for cough and shortness of breath.    Cardiovascular: Positive for leg swelling. Negative for chest pain.   Gastrointestinal: Negative for abdominal pain.   Skin: Positive for wound.   Neurological: Positive for weakness.     Objective:     Vital Signs (Most Recent):  Temp: 98 °F (36.7 °C) (01/28/22 1454)  Pulse: 87 (01/28/22 1715)  Resp: 17 (01/28/22 1454)  BP: 131/72 (01/28/22 1715)  SpO2: (!) 93 % (01/28/22 1454) Vital Signs (24h Range):  Temp:  [96.1 °F (35.6 °C)-98 °F (36.7 °C)] 98 °F (36.7 °C)  Pulse:  [67-91] 87  Resp:  [16-18] 17  SpO2:  [93 %-98 %] 93 %  BP: (101-149)/(50-82) 131/72     Weight: 120.7 kg (265 lb 15.8 oz)  Body mass index is 47.12 kg/m².    Intake/Output Summary (Last 24 hours) at 1/28/2022 1804  Last data filed at 1/28/2022 0745  Gross per 24 hour   Intake 640 ml   Output 1200 ml   Net -560 ml      Physical Exam  Vitals and nursing note reviewed.   Constitutional:       General: She is not in acute distress.     Appearance: She is obese.   Cardiovascular:      Rate and Rhythm: Normal rate.   Pulmonary:      Effort: Pulmonary effort is normal. No respiratory distress.   Neurological:      Mental Status: She is alert and oriented to  "person, place, and time.      Motor: Weakness present.   Psychiatric:         Behavior: Behavior normal.         Thought Content: Thought content normal.         Significant Labs: All pertinent labs within the past 24 hours have been reviewed.    Significant Imaging: I have reviewed all pertinent imaging results/findings within the past 24 hours.      Assessment/Plan:      * Dialysis patient  See CKD4      Pressure injury of right buttock, stage 3  Would care consulted  Appreciate recs      Urinary tract infection due to ESBL Klebsiella  See above      Anemia of chronic disease  stable      Secondary hypertension  Proteinuria on labs  Added lisinopril 10mg 1/24      Debility  PT/OT consulted  SNF recommended; pt wants OSNF  -- appreciate SW assistance  --placement pending     A-fib  Continue apixaban 2.5mg BID  Cont BB      ZAK (acute kidney injury)  Dialysis dependent ZAK  Nephrology consulted, appreciate recs  Suprapubic murillo declogged in ED, see UTI  Low albumin and history of nephrotic syndrome    Plan:  - Trend Cr  - Strict I&Os  - Avoid nephrotoxic agents  - Renally adjust medications  - Prealbumin low  - P/C ratio elevated 9.33  -- History of nephrotic syndrome      Normocytic anemia  At baseline on admission  Anemia of chronic disease    CBC daily  Transfuse if Hbg <7  Checking iron studies, folate, B12, and epo with HD    S/P left mastectomy  See malignant neoplasm of left breast      Requires daily assistance for activities of daily living (ADL) and comfort needs  Cont PT/OT      Malignant neoplasm of left breast, estrogen receptor positive  History of breast cancer status post radical mastectomy on 8/1/2019 on anastrozole   Stable    Continue anastrozole 1mg daily    Cervicogenic migraine  PRNs ordered      CKD stage 4 due to type 2 diabetes mellitus  Dialysis dependent    Nephrology consulted, appreciate recs  Last HD 1/25  "S/p HD session today. Pt made 3 lit urine . Will hold off on next HD session " "and evaluate the need for RRT on daily basis"  Labs pending  Nephrology recs pending for dialysis needs    1/28  -plan for HD today, patient with good urine output, may require RRT twice weekly  - final nephrology recs pending         Chronic pain  PRN multimodal pain regimen      Long term prescription opiate use   with Percocet and butalbital-aceaminophen-caffeine  See chronic pain, migraines      Morbid obesity due to excess calories  Body mass index is 47.12 kg/m². Morbid obesity complicates all aspects of disease management from diagnostic modalities to treatment. Weight loss encouraged and health benefits explained to patient.     RD consulted  1500 calorie diabetic diet  BOOST    Suprapubic catheter  See neurogenic bladder      Uncontrolled type 2 diabetes mellitus with stage 4 chronic kidney disease, with long-term current use of insulin  A1c (1/4/2022) 5.4%    Diabetic diet  Continue to monitor  LDSSI  POCT BG    Major depressive disorder, recurrent episode, moderate  Not on any home medications    Continue to monitor  Consider psych consult if worsening affect        Neurogenic bladder  Continue oxybutynin 10mg daily, furosemide 160mg BID  Suprapubic catheter      Recurrent urinary tract infection  History of ESBL and MDRO with current UTI symptoms, dirty UA, and suprapubic catheter.    Plan:  - consult ID, appreciate recs  --  Plan on 3d of Meropenem (D3/3) then DC on Bactrim DS qod x 3 doses  -- Consider cont meropenem vs Bactrim if patient goes to OSNF  -- Exchange murillo cath after abx are complete  - UCx ESBL    I/D Recs as follows:  Plan: 1. Continue Meropenem x 7d total (from SP change 1/23 as urine still cloudy) - eoc 1/30 2. Rec SNF placement to complete IV abx 3. Contact isolation given prior ESBL 4. weekl CBC and CMP while on meropene 5. Discussed with ID staff 6. Needs urology close fu after dc 7. Will sign off      With end date of antibiotics 1/30/22          Hyponatremia  Chronic " hyponatremia 130 baseline, 126 on admission  Secondary to fluid overload, UTI  Nephro consulted for HD  Na improved      VIJAYA (obstructive sleep apnea)  CPAP QHS      Hyperlipidemia associated with type 2 diabetes mellitus  Continue atorvastatin 80mg daily, gemfibrozil 600mg MWF      Fibromyalgia  PT/OT  Pain regimen      Hypothyroidism  Continue levothyroxine 50mcg daily        VTE Risk Mitigation (From admission, onward)         Ordered     heparin (porcine) injection 1,000 Units  As needed (PRN)         01/28/22 1038     heparin (porcine) injection 1,000 Units  As needed (PRN)         01/24/22 0828     apixaban tablet 2.5 mg  2 times daily         01/21/22 1609     IP VTE HIGH RISK PATIENT  Once         01/21/22 1609     Place sequential compression device  Until discontinued         01/21/22 1609                      I have assessed these finding virtually using telemed platform and with assistance of bedside nurse                 The attending portion of this evaluation, treatment, and documentation was performed per Dianne Santo NP via Telemedicine AudioVisual using the secure [employer] software platform with 2 way audio/video. The provider was located off-site and the patient is located in the hospital. The aforementioned video software was utilized to document the relevant history and physical exam    Dianne Santo NP  Department of Hospital Medicine   [facility]

## 2022-01-29 NOTE — ASSESSMENT & PLAN NOTE
"Dialysis dependent    Nephrology consulted, appreciate recs  Last HD 1/25  "S/p HD session today. Pt made 3 lit urine . Will hold off on next HD session and evaluate the need for RRT on daily basis"  Labs pending  Nephrology recs pending for dialysis needs    1/28  -plan for HD today, patient with good urine output, may require RRT twice weekly  - final nephrology recs pending       "

## 2022-01-29 NOTE — SUBJECTIVE & OBJECTIVE
Interval History: no acute events overnight. Denies complaints.   Review of Systems   Constitutional: Positive for fatigue. Negative for chills and fever.   Respiratory: Negative for cough and shortness of breath.    Cardiovascular: Positive for leg swelling. Negative for chest pain.   Gastrointestinal: Negative for abdominal pain.   Skin: Positive for wound.   Neurological: Positive for weakness.     Objective:     Vital Signs (Most Recent):  Temp: 98 °F (36.7 °C) (01/28/22 1454)  Pulse: 87 (01/28/22 1715)  Resp: 17 (01/28/22 1454)  BP: 131/72 (01/28/22 1715)  SpO2: (!) 93 % (01/28/22 1454) Vital Signs (24h Range):  Temp:  [96.1 °F (35.6 °C)-98 °F (36.7 °C)] 98 °F (36.7 °C)  Pulse:  [67-91] 87  Resp:  [16-18] 17  SpO2:  [93 %-98 %] 93 %  BP: (101-149)/(50-82) 131/72     Weight: 120.7 kg (265 lb 15.8 oz)  Body mass index is 47.12 kg/m².    Intake/Output Summary (Last 24 hours) at 1/28/2022 1804  Last data filed at 1/28/2022 0745  Gross per 24 hour   Intake 640 ml   Output 1200 ml   Net -560 ml      Physical Exam  Vitals and nursing note reviewed.   Constitutional:       General: She is not in acute distress.     Appearance: She is obese.   Cardiovascular:      Rate and Rhythm: Normal rate.   Pulmonary:      Effort: Pulmonary effort is normal. No respiratory distress.   Neurological:      Mental Status: She is alert and oriented to person, place, and time.      Motor: Weakness present.   Psychiatric:         Behavior: Behavior normal.         Thought Content: Thought content normal.         Significant Labs: All pertinent labs within the past 24 hours have been reviewed.    Significant Imaging: I have reviewed all pertinent imaging results/findings within the past 24 hours.

## 2022-01-29 NOTE — PLAN OF CARE
Problem: Skin Injury Risk Increased  Goal: Skin Health and Integrity  Outcome: Ongoing, Progressing

## 2022-01-30 LAB
ANION GAP SERPL CALC-SCNC: 9 MMOL/L (ref 8–16)
BASOPHILS # BLD AUTO: 0.05 K/UL (ref 0–0.2)
BASOPHILS NFR BLD: 0.7 % (ref 0–1.9)
BUN SERPL-MCNC: 23 MG/DL (ref 8–23)
CALCIUM SERPL-MCNC: 8.5 MG/DL (ref 8.7–10.5)
CHLORIDE SERPL-SCNC: 96 MMOL/L (ref 95–110)
CO2 SERPL-SCNC: 27 MMOL/L (ref 23–29)
CREAT SERPL-MCNC: 1.5 MG/DL (ref 0.5–1.4)
DIFFERENTIAL METHOD: ABNORMAL
EOSINOPHIL # BLD AUTO: 0.2 K/UL (ref 0–0.5)
EOSINOPHIL NFR BLD: 3.5 % (ref 0–8)
ERYTHROCYTE [DISTWIDTH] IN BLOOD BY AUTOMATED COUNT: 15 % (ref 11.5–14.5)
EST. GFR  (AFRICAN AMERICAN): 40.7 ML/MIN/1.73 M^2
EST. GFR  (NON AFRICAN AMERICAN): 35.3 ML/MIN/1.73 M^2
GLUCOSE SERPL-MCNC: 135 MG/DL (ref 70–110)
HCT VFR BLD AUTO: 28 % (ref 37–48.5)
HGB BLD-MCNC: 8.7 G/DL (ref 12–16)
IMM GRANULOCYTES # BLD AUTO: 0.13 K/UL (ref 0–0.04)
IMM GRANULOCYTES NFR BLD AUTO: 1.9 % (ref 0–0.5)
LYMPHOCYTES # BLD AUTO: 2 K/UL (ref 1–4.8)
LYMPHOCYTES NFR BLD: 29.1 % (ref 18–48)
MAGNESIUM SERPL-MCNC: 1.6 MG/DL (ref 1.6–2.6)
MCH RBC QN AUTO: 28.5 PG (ref 27–31)
MCHC RBC AUTO-ENTMCNC: 31.1 G/DL (ref 32–36)
MCV RBC AUTO: 92 FL (ref 82–98)
MONOCYTES # BLD AUTO: 0.8 K/UL (ref 0.3–1)
MONOCYTES NFR BLD: 11 % (ref 4–15)
NEUTROPHILS # BLD AUTO: 3.7 K/UL (ref 1.8–7.7)
NEUTROPHILS NFR BLD: 53.8 % (ref 38–73)
NRBC BLD-RTO: 0 /100 WBC
OSMOLALITY UR: 214 MOSM/KG (ref 50–1200)
PHOSPHATE SERPL-MCNC: 4.6 MG/DL (ref 2.7–4.5)
PLATELET # BLD AUTO: 249 K/UL (ref 150–450)
PMV BLD AUTO: 9.6 FL (ref 9.2–12.9)
POCT GLUCOSE: 161 MG/DL (ref 70–110)
POCT GLUCOSE: 177 MG/DL (ref 70–110)
POCT GLUCOSE: 188 MG/DL (ref 70–110)
POCT GLUCOSE: 193 MG/DL (ref 70–110)
POTASSIUM SERPL-SCNC: 3.3 MMOL/L (ref 3.5–5.1)
RBC # BLD AUTO: 3.05 M/UL (ref 4–5.4)
SODIUM SERPL-SCNC: 132 MMOL/L (ref 136–145)
SODIUM UR-SCNC: 66 MMOL/L (ref 20–250)
WBC # BLD AUTO: 6.88 K/UL (ref 3.9–12.7)

## 2022-01-30 PROCEDURE — 97110 THERAPEUTIC EXERCISES: CPT | Mod: HCNC

## 2022-01-30 PROCEDURE — 25000003 PHARM REV CODE 250: Mod: HCNC | Performed by: STUDENT IN AN ORGANIZED HEALTH CARE EDUCATION/TRAINING PROGRAM

## 2022-01-30 PROCEDURE — 97110 THERAPEUTIC EXERCISES: CPT | Mod: HCNC,CQ

## 2022-01-30 PROCEDURE — 25000003 PHARM REV CODE 250: Mod: HCNC

## 2022-01-30 PROCEDURE — 84100 ASSAY OF PHOSPHORUS: CPT | Mod: HCNC

## 2022-01-30 PROCEDURE — 36415 COLL VENOUS BLD VENIPUNCTURE: CPT | Mod: HCNC

## 2022-01-30 PROCEDURE — 83735 ASSAY OF MAGNESIUM: CPT | Mod: HCNC

## 2022-01-30 PROCEDURE — 25000003 PHARM REV CODE 250: Mod: HCNC | Performed by: INTERNAL MEDICINE

## 2022-01-30 PROCEDURE — 11000001 HC ACUTE MED/SURG PRIVATE ROOM: Mod: HCNC

## 2022-01-30 PROCEDURE — 27000207 HC ISOLATION: Mod: HCNC

## 2022-01-30 PROCEDURE — 25000003 PHARM REV CODE 250: Mod: HCNC | Performed by: NURSE PRACTITIONER

## 2022-01-30 PROCEDURE — 83935 ASSAY OF URINE OSMOLALITY: CPT | Mod: HCNC | Performed by: STUDENT IN AN ORGANIZED HEALTH CARE EDUCATION/TRAINING PROGRAM

## 2022-01-30 PROCEDURE — 84300 ASSAY OF URINE SODIUM: CPT | Mod: HCNC | Performed by: STUDENT IN AN ORGANIZED HEALTH CARE EDUCATION/TRAINING PROGRAM

## 2022-01-30 PROCEDURE — 85025 COMPLETE CBC W/AUTO DIFF WBC: CPT | Mod: HCNC

## 2022-01-30 PROCEDURE — 80048 BASIC METABOLIC PNL TOTAL CA: CPT | Mod: HCNC

## 2022-01-30 PROCEDURE — 97535 SELF CARE MNGMENT TRAINING: CPT | Mod: HCNC

## 2022-01-30 RX ORDER — POTASSIUM CHLORIDE 20 MEQ/1
40 TABLET, EXTENDED RELEASE ORAL ONCE
Status: COMPLETED | OUTPATIENT
Start: 2022-01-30 | End: 2022-01-30

## 2022-01-30 RX ADMIN — OXYBUTYNIN CHLORIDE 10 MG: 10 TABLET, FILM COATED, EXTENDED RELEASE ORAL at 08:01

## 2022-01-30 RX ADMIN — BUTALBITAL, ACETAMINOPHEN, AND CAFFEINE 1 TABLET: 50; 325; 40 TABLET ORAL at 08:01

## 2022-01-30 RX ADMIN — METOPROLOL TARTRATE 25 MG: 25 TABLET, FILM COATED ORAL at 09:01

## 2022-01-30 RX ADMIN — SUMATRIPTAN SUCCINATE 100 MG: 50 TABLET ORAL at 05:01

## 2022-01-30 RX ADMIN — APIXABAN 2.5 MG: 2.5 TABLET, FILM COATED ORAL at 08:01

## 2022-01-30 RX ADMIN — OXYCODONE HYDROCHLORIDE 10 MG: 10 TABLET ORAL at 06:01

## 2022-01-30 RX ADMIN — FUROSEMIDE 160 MG: 80 TABLET ORAL at 08:01

## 2022-01-30 RX ADMIN — POLYETHYLENE GLYCOL 3350 17 G: 17 POWDER, FOR SOLUTION ORAL at 09:01

## 2022-01-30 RX ADMIN — CLONIDINE HYDROCHLORIDE 0.2 MG: 0.2 TABLET ORAL at 08:01

## 2022-01-30 RX ADMIN — Medication 6 MG: at 08:01

## 2022-01-30 RX ADMIN — ANASTROZOLE 1 MG: 1 TABLET, COATED ORAL at 08:01

## 2022-01-30 RX ADMIN — LEVOTHYROXINE SODIUM 50 MCG: 50 TABLET ORAL at 06:01

## 2022-01-30 RX ADMIN — SENNOSIDES AND DOCUSATE SODIUM 1 TABLET: 50; 8.6 TABLET ORAL at 08:01

## 2022-01-30 RX ADMIN — ATORVASTATIN CALCIUM 80 MG: 20 TABLET, FILM COATED ORAL at 08:01

## 2022-01-30 RX ADMIN — METHOCARBAMOL 750 MG: 750 TABLET ORAL at 08:01

## 2022-01-30 RX ADMIN — POTASSIUM CHLORIDE 40 MEQ: 1500 TABLET, EXTENDED RELEASE ORAL at 12:01

## 2022-01-30 RX ADMIN — MUPIROCIN: 20 OINTMENT TOPICAL at 09:01

## 2022-01-30 RX ADMIN — VENLAFAXINE HYDROCHLORIDE 150 MG: 75 CAPSULE, EXTENDED RELEASE ORAL at 08:01

## 2022-01-30 RX ADMIN — CLONIDINE HYDROCHLORIDE 0.2 MG: 0.2 TABLET ORAL at 03:01

## 2022-01-30 RX ADMIN — POLYETHYLENE GLYCOL 3350 17 G: 17 POWDER, FOR SOLUTION ORAL at 08:01

## 2022-01-30 RX ADMIN — FAMOTIDINE 20 MG: 20 TABLET ORAL at 08:01

## 2022-01-30 RX ADMIN — METOPROLOL TARTRATE 25 MG: 25 TABLET, FILM COATED ORAL at 08:01

## 2022-01-30 RX ADMIN — OXYCODONE HYDROCHLORIDE 10 MG: 10 TABLET ORAL at 03:01

## 2022-01-30 RX ADMIN — OXYCODONE HYDROCHLORIDE 10 MG: 10 TABLET ORAL at 08:01

## 2022-01-30 RX ADMIN — LISINOPRIL 10 MG: 10 TABLET ORAL at 08:01

## 2022-01-30 NOTE — ASSESSMENT & PLAN NOTE
PT/OT consulted  SNF recommended; pt wants OSNF but has a copay for further SNF stay which she cannot provide  -- appreciate SW assistance  -- plan for home with home health once transportation issues resolved (her main reason for missing HD)  -  she has a christina lift at home for transfers

## 2022-01-30 NOTE — PT/OT/SLP PROGRESS
"Physical Therapy Treatment    Patient Name:  Cecile Bowen   MRN:  [id number]    Recommendations:     Discharge Recommendations:  nursing facility, skilled   Discharge Equipment Recommendations: none   Barriers to discharge: Decreased caregiver support    Assessment:     Cecile Bowen is a 71 y.o. female admitted with a medical diagnosis of Dialysis patient.  She presents with the following impairments/functional limitations:  weakness,impaired endurance,impaired self care skills,impaired functional mobilty,decreased ROM,edema,decreased lower extremity function,decreased upper extremity function.    Patient declined EOB activity but agreed to BLE exercises in supine. She performed BLE AP,HS,QS,hip abd/ add 2 x 10 reps each with rests as needed. Will continue PT per pOC and will continue to encourage upright sitting    Rehab Prognosis: Good; patient would benefit from acute skilled PT services to address these deficits and reach maximum level of function.    Recent Surgery: * No surgery found *      Plan:     During this hospitalization, patient to be seen 3 x/week to address the identified rehab impairments via therapeutic activities,therapeutic exercises and progress toward the following goals:    · Plan of Care Expires:  02/22/22    Subjective     Chief Complaint: fatigue after working with OT  Patient/Family Comments/goals: "I already got up today".   Pain/Comfort:  · Pain Rating 1: 0/10      Objective:     Communicated with Nurse prior to session.  Patient found supine with peripheral IV,murillo catheter upon PT entry to room.     General Precautions: Standard, contact,fall   Orthopedic Precautions:N/A   Braces: N/A  Respiratory Status: Room air     Functional Mobility:  Declined mobility, agreed to exercises in bed.       AM-PAC 6 CLICK MOBILITY  Turning over in bed (including adjusting bedclothes, sheets and blankets)?: 3  Sitting down on and standing up from a chair with arms (e.g., wheelchair, bedside commode, " etc.): 2  Moving from lying on back to sitting on the side of the bed?: 3  Moving to and from a bed to a chair (including a wheelchair)?: 1  Need to walk in hospital room?: 1  Climbing 3-5 steps with a railing?: 1  Basic Mobility Total Score: 11       Therapeutic Activities and Exercises:   -White board updated  -Educ patient on benefit of OOB activity once home. Reports her sister is Independent with christina transfer for patient at home.     Patient left supine with all lines intact and call button in reach..    GOALS:   Multidisciplinary Problems     Physical Therapy Goals        Problem: Physical Therapy Goal    Goal Priority Disciplines Outcome Goal Variances Interventions   Physical Therapy Goal     PT, PT/OT Ongoing, Progressing     Description: Goals to be met by: 22    Patient will increase functional independence with mobility by performin. Supine to sit with MInimal Assistance  2. Sit to supine with MInimal Assistance  3. Sit to stand transfer with Maximum Assistance  4.Patient will demonstrate independence with a home exercise program  5. Bed to chair transfer with Contact Guard Assistance using Slideboard                   Time Tracking:     PT Received On: 22  PT Start Time: 1530     PT Stop Time: 1545  PT Total Time (min): 15 min     Billable Minutes: Therapeutic Exercise 15    Treatment Type: Treatment  PT/PTA: PTA     PTA Visit Number: 3     2022

## 2022-01-30 NOTE — SUBJECTIVE & OBJECTIVE
This encounter was provided through telemedicine.  Patient was transferred to the telemedicine service on:  01/30/2022   The patient location is: 608/608 A admitted 1/21/2022 11:36 AM.  Present with the patient at the time of the telemed/virtual assessment: Telepresenter    Interval History/Overnight Events:     Uneventful night - no HA - not eating much but drinking Boost - will try Novasource renal due to elevated Phos; potassium low this am so repleted gently      Review of Systems   Constitutional: Negative for fever.   Respiratory: Negative for cough and shortness of breath.    Cardiovascular: Negative for chest pain.   Neurological: Positive for weakness (Generalized).        Inpatient Medications:  Scheduled Meds:   sodium chloride 0.9%   Intravenous Once    sodium chloride 0.9%   Intravenous Once    anastrozole  1 mg Oral Daily    apixaban  2.5 mg Oral BID    atorvastatin  80 mg Oral Daily    cloNIDine  0.2 mg Oral TID    epoetin chloe-ebpx (RETACRIT) injection  50 Units/kg Intravenous Every Tues, Thurs, Sat    ergocalciferol  50,000 Units Oral Q7 Days    famotidine  20 mg Oral Daily    furosemide  160 mg Oral BID    gemfibroziL  600 mg Oral Every Mon, Wed, Fri    levothyroxine  50 mcg Oral Before breakfast    lisinopriL  10 mg Oral Daily    meropenem (MERREM) IVPB  1 g Intravenous Q24H    metoprolol tartrate  25 mg Oral BID    mupirocin   Nasal BID    oxybutynin  10 mg Oral Daily    polyethylene glycol  17 g Oral BID    senna-docusate 8.6-50 mg  1 tablet Oral BID    venlafaxine  150 mg Oral Daily     Continuous Infusions:  PRN Meds:.acetaminophen, butalbital-acetaminophen-caffeine -40 mg, dextrose 50%, dextrose 50%, glucagon (human recombinant), glucose, glucose, heparin (porcine), heparin (porcine), influenza, insulin aspart U-100, melatonin, methocarbamoL, ondansetron, oxyCODONE, sodium chloride 0.9%, sodium chloride 0.9%, sodium chloride 0.9%, sumatriptan      Objective:      Temp:  [96.3 °F (35.7 °C)-99.3 °F (37.4 °C)] 98.9 °F (37.2 °C)  Pulse:  [73-83] 83  Resp:  [16-18] 16  SpO2:  [90 %-96 %] 96 %  BP: (149-169)/(67-75) 159/74      Intake/Output Summary (Last 24 hours) at 1/30/2022 1053  Last data filed at 1/30/2022 0600  Gross per 24 hour   Intake 730 ml   Output 3050 ml   Net -2320 ml        Body mass index is 47.12 kg/m².    Physical Exam  Vitals and nursing note reviewed.   Constitutional:       General: She is not in acute distress.     Appearance: Normal appearance. She is obese.   HENT:      Head: Normocephalic and atraumatic.   Eyes:      General: No scleral icterus.        Right eye: No discharge.         Left eye: No discharge.      Extraocular Movements: Extraocular movements intact.   Cardiovascular:      Rate and Rhythm: Normal rate.   Pulmonary:      Effort: Pulmonary effort is normal. No tachypnea or respiratory distress.   Neurological:      General: No focal deficit present.      Mental Status: She is alert and oriented to person, place, and time.      Cranial Nerves: No cranial nerve deficit.      Motor: No weakness.   Psychiatric:         Attention and Perception: Attention normal.         Mood and Affect: Mood and affect normal.         Speech: Speech normal.         Behavior: Behavior is cooperative.          Labs:  Recent Results (from the past 24 hour(s))   POCT glucose    Collection Time: 01/29/22 11:21 AM   Result Value Ref Range    POCT Glucose 204 (H) 70 - 110 mg/dL   POCT glucose    Collection Time: 01/29/22  4:01 PM   Result Value Ref Range    POCT Glucose 141 (H) 70 - 110 mg/dL   POCT glucose    Collection Time: 01/29/22  9:39 PM   Result Value Ref Range    POCT Glucose 180 (H) 70 - 110 mg/dL   Magnesium    Collection Time: 01/30/22  4:50 AM   Result Value Ref Range    Magnesium 1.6 1.6 - 2.6 mg/dL   Phosphorus    Collection Time: 01/30/22  4:50 AM   Result Value Ref Range    Phosphorus 4.6 (H) 2.7 - 4.5 mg/dL   CBC auto differential    Collection  Time: 01/30/22  4:50 AM   Result Value Ref Range    WBC 6.88 3.90 - 12.70 K/uL    RBC 3.05 (L) 4.00 - 5.40 M/uL    Hemoglobin 8.7 (L) 12.0 - 16.0 g/dL    Hematocrit 28.0 (L) 37.0 - 48.5 %    MCV 92 82 - 98 fL    MCH 28.5 27.0 - 31.0 pg    MCHC 31.1 (L) 32.0 - 36.0 g/dL    RDW 15.0 (H) 11.5 - 14.5 %    Platelets 249 150 - 450 K/uL    MPV 9.6 9.2 - 12.9 fL    Immature Granulocytes 1.9 (H) 0.0 - 0.5 %    Gran # (ANC) 3.7 1.8 - 7.7 K/uL    Immature Grans (Abs) 0.13 (H) 0.00 - 0.04 K/uL    Lymph # 2.0 1.0 - 4.8 K/uL    Mono # 0.8 0.3 - 1.0 K/uL    Eos # 0.2 0.0 - 0.5 K/uL    Baso # 0.05 0.00 - 0.20 K/uL    nRBC 0 0 /100 WBC    Gran % 53.8 38.0 - 73.0 %    Lymph % 29.1 18.0 - 48.0 %    Mono % 11.0 4.0 - 15.0 %    Eosinophil % 3.5 0.0 - 8.0 %    Basophil % 0.7 0.0 - 1.9 %    Differential Method Automated    Basic metabolic panel    Collection Time: 01/30/22  4:50 AM   Result Value Ref Range    Sodium 132 (L) 136 - 145 mmol/L    Potassium 3.3 (L) 3.5 - 5.1 mmol/L    Chloride 96 95 - 110 mmol/L    CO2 27 23 - 29 mmol/L    Glucose 135 (H) 70 - 110 mg/dL    BUN 23 8 - 23 mg/dL    Creatinine 1.5 (H) 0.5 - 1.4 mg/dL    Calcium 8.5 (L) 8.7 - 10.5 mg/dL    Anion Gap 9 8 - 16 mmol/L    eGFR if African American 40.7 (A) >60 mL/min/1.73 m^2    eGFR if non  35.3 (A) >60 mL/min/1.73 m^2   POCT glucose    Collection Time: 01/30/22  7:30 AM   Result Value Ref Range    POCT Glucose 161 (H) 70 - 110 mg/dL        Lab Results   Component Value Date    [id number] Negative 01/21/2022       Recent Labs   Lab 01/28/22  0330 01/28/22 0330 01/29/22 0416 01/30/22  0450   WBC 7.88  --  7.58 6.88   LYMPH 30.8  2.4   < > 21.1  1.6 29.1  2.0   HGB 7.9*  --  8.4* 8.7*   HCT 24.5*  --  26.6* 28.0*     --  212 249    < > = values in this interval not displayed.     Recent Labs   Lab 01/28/22 0330 01/29/22 0416 01/30/22  0450   * 130* 132*   K 3.8 3.6 3.3*   CL 89* 95 96   CO2 28 25 27   BUN 26* 15 23   CREATININE 2.1*  1.5* 1.5*   * 138* 135*   CALCIUM 8.4* 7.9* 8.5*   MG 1.5* 1.7 1.6   PHOS 5.3* 3.8 4.6*     No results for input(s): ALKPHOS, ALT, AST, ALBUMIN, PROT, BILITOT, INR in the last 168 hours.     No results for input(s): DDIMER, FERRITIN, CRP, LDH, BNP, TROPONINI, CPK in the last 72 hours.    Invalid input(s): PROCALCITONIN    All labs within the last 24 hours were reviewed.     Microbiology:  Microbiology Results (last 7 days)     Procedure Component Value Units Date/Time    Urine culture [[id number]]  (Abnormal)  (Susceptibility) Collected: 01/21/22 1402    Order Status: Completed Specimen: Urine Updated: 01/26/22 1210     Urine Culture, Routine KLEBSIELLA PNEUMONIAE ESBL  > 100,000 cfu/ml      Narrative:      Specimen Source->Urine            Imaging  ECG Results          EKG 12-lead (Final result)  Result time 01/22/22 10:43:30    Final result by Interface, Lab In [facility] (01/22/22 10:43:30)                 Narrative:    Test Reason : R07.9,    Vent. Rate : 090 BPM     Atrial Rate : 090 BPM     P-R Int : 150 ms          QRS Dur : 084 ms      QT Int : 372 ms       P-R-T Axes : 003 007 098 degrees     QTc Int : 455 ms    Normal sinus rhythm  Nonspecific T wave abnormality  Abnormal ECG  When compared with ECG of 20-JAN-2022 19:58,  No significant change was found  Confirmed by Juancarlos DAINEL, Crow HICKS ([id number]) on 1/22/2022 10:43:13 AM    Referred By: ELVA   SELF           Confirmed By:Crow Bauer MD                              Results for orders placed during the hospital encounter of 11/28/21    Echo    Interpretation Summary  · The left ventricle is normal in size with normal systolic function. The estimated ejection fraction is 60%.  · Normal right ventricular size with normal right ventricular systolic function.  · Normal left ventricular diastolic function.  · Mechanically ventilated; cannot use inferior caval vein diameter to estimate central venous pressure.      X-Ray Chest AP Portable  Narrative:  EXAMINATION:  XR CHEST AP PORTABLE    CLINICAL HISTORY:  chest discomfort;    TECHNIQUE:  Single frontal view of the chest was performed.    COMPARISON:  01/20/2022    FINDINGS:  Again noted is right diaphragmatic elevation with a meniscus sign, likely indicating at least a small volume right pleural effusion.  The left lung field and pleural space remains clear.  The heart size appears normal.  There is mild calcification of the aortic knob consistent with atherosclerotic stigmata.  Tunneled right hemodialysis catheter noted.  Impression: No interval detrimental change identified.    Electronically signed by: Rosaline Farah  Date:    01/21/2022  Time:    13:34      All imaging within the last 24 hours was reviewed.       Discharge Planning   FLORENTINO: 1/31/2022     Code Status: Full Code   Is the patient medically ready for discharge?: Yes    Reason for patient still in hospital (select all that apply): Patient trending condition, Treatment and Pending disposition  Discharge Plan A: Skilled Nursing Facility

## 2022-01-30 NOTE — PLAN OF CARE
Problem: Occupational Therapy Goal  Goal: Occupational Therapy Goal  Description: Goals to be met by: 02-03-22     Patient will increase functional independence with ADLs by performing:    UE Dressing with Set-up Assistance.  Grooming while seated with Set-up Assistance.  Toileting from bedside commode with Moderate Assistance for hygiene and clothing management using drop arm commode and slide board  Sitting at edge of bed x15 minutes with Modified [place].  Supine to sit with Minimal Assistance.  Slide board  transfers with Minimal Assistance.  Pt. To be I with HEP to BUE to improve level of endurance (RUE with RTC injury)  Pt. To perform sit to stand from EOB with Mod A    Outcome: Ongoing, Progressing

## 2022-01-30 NOTE — ASSESSMENT & PLAN NOTE
A1c (1/4/2022) 5.4%    Diabetic diet  Continue to monitor  Sliding scale NovoLog insulin as needed for hyperglycemia  Glucose monitoring

## 2022-01-30 NOTE — SUBJECTIVE & OBJECTIVE
This encounter was provided through telemedicine.  Patient was transferred to the telemedicine service on:  01/29/2022   The patient location is: 608/608 A admitted 1/21/2022 11:36 AM.  Present with the patient at the time of the telemed/virtual assessment: Telepresenter    Interval History/Overnight Events:   Clinical record since admit reviewed.    Sleepy this a.m. but improved during visit and did receive sumatriptan for migraine last night; headache is improved; she has chronic migraine sometimes occurring daily; she has been tolerating her meals; last bowel movement on 01/28; patient awaiting completion of transportation requirements to dialysis; no issues with her suprapubic catheter    Review of Systems   Constitutional: Negative for fever.   Respiratory: Negative for cough and shortness of breath.    Cardiovascular: Negative for chest pain.   Neurological: Positive for weakness (Generalized).        Inpatient Medications:  Scheduled Meds:   sodium chloride 0.9%   Intravenous Once    sodium chloride 0.9%   Intravenous Once    anastrozole  1 mg Oral Daily    apixaban  2.5 mg Oral BID    atorvastatin  80 mg Oral Daily    cloNIDine  0.2 mg Oral TID    epoetin chloe-ebpx (RETACRIT) injection  50 Units/kg Intravenous Every Tues, Thurs, Sat    ergocalciferol  50,000 Units Oral Q7 Days    famotidine  20 mg Oral Daily    furosemide  160 mg Oral BID    gemfibroziL  600 mg Oral Every Mon, Wed, Fri    levothyroxine  50 mcg Oral Before breakfast    lisinopriL  10 mg Oral Daily    meropenem (MERREM) IVPB  1 g Intravenous Q24H    metoprolol tartrate  25 mg Oral BID    mupirocin   Nasal BID    oxybutynin  10 mg Oral Daily    polyethylene glycol  17 g Oral BID    senna-docusate 8.6-50 mg  1 tablet Oral BID    venlafaxine  150 mg Oral Daily     Continuous Infusions:  PRN Meds:.acetaminophen, butalbital-acetaminophen-caffeine -40 mg, dextrose 50%, dextrose 50%, glucagon (human recombinant), glucose,  glucose, heparin (porcine), heparin (porcine), influenza, insulin aspart U-100, melatonin, methocarbamoL, ondansetron, oxyCODONE, sodium chloride 0.9%, sodium chloride 0.9%, sodium chloride 0.9%, sumatriptan      Objective:     Temp:  [96.2 °F (35.7 °C)-98.9 °F (37.2 °C)] 98.9 °F (37.2 °C)  Pulse:  [65-91] 77  Resp:  [16-18] 16  SpO2:  [90 %-95 %] 95 %  BP: (137-169)/(64-77) 149/67      Intake/Output Summary (Last 24 hours) at 1/29/2022 1845  Last data filed at 1/29/2022 1616  Gross per 24 hour   Intake 960 ml   Output --   Net 960 ml        Body mass index is 47.12 kg/m².    Physical Exam  Vitals and nursing note reviewed.   Constitutional:       General: She is not in acute distress.     Appearance: Normal appearance. She is obese.   HENT:      Head: Normocephalic and atraumatic.   Eyes:      General: No scleral icterus.        Right eye: No discharge.         Left eye: No discharge.      Extraocular Movements: Extraocular movements intact.   Cardiovascular:      Rate and Rhythm: Normal rate.   Pulmonary:      Effort: Pulmonary effort is normal. No tachypnea or respiratory distress.   Neurological:      General: No focal deficit present.      Mental Status: She is alert and oriented to person, place, and time.      Cranial Nerves: No cranial nerve deficit.      Motor: No weakness.   Psychiatric:         Attention and Perception: Attention normal.         Mood and Affect: Mood and affect normal.         Speech: Speech normal.         Behavior: Behavior is cooperative.          Labs:  Recent Results (from the past 24 hour(s))   POCT glucose    Collection Time: 01/28/22  9:36 PM   Result Value Ref Range    POCT Glucose 185 (H) 70 - 110 mg/dL   Magnesium    Collection Time: 01/29/22  4:16 AM   Result Value Ref Range    Magnesium 1.7 1.6 - 2.6 mg/dL   Phosphorus    Collection Time: 01/29/22  4:16 AM   Result Value Ref Range    Phosphorus 3.8 2.7 - 4.5 mg/dL   CBC auto differential    Collection Time: 01/29/22  4:16 AM    Result Value Ref Range    WBC 7.58 3.90 - 12.70 K/uL    RBC 2.87 (L) 4.00 - 5.40 M/uL    Hemoglobin 8.4 (L) 12.0 - 16.0 g/dL    Hematocrit 26.6 (L) 37.0 - 48.5 %    MCV 93 82 - 98 fL    MCH 29.3 27.0 - 31.0 pg    MCHC 31.6 (L) 32.0 - 36.0 g/dL    RDW 14.9 (H) 11.5 - 14.5 %    Platelets 212 150 - 450 K/uL    MPV 9.3 9.2 - 12.9 fL    Immature Granulocytes 1.7 (H) 0.0 - 0.5 %    Gran # (ANC) 4.8 1.8 - 7.7 K/uL    Immature Grans (Abs) 0.13 (H) 0.00 - 0.04 K/uL    Lymph # 1.6 1.0 - 4.8 K/uL    Mono # 0.8 0.3 - 1.0 K/uL    Eos # 0.2 0.0 - 0.5 K/uL    Baso # 0.03 0.00 - 0.20 K/uL    nRBC 0 0 /100 WBC    Gran % 63.0 38.0 - 73.0 %    Lymph % 21.1 18.0 - 48.0 %    Mono % 10.6 4.0 - 15.0 %    Eosinophil % 3.2 0.0 - 8.0 %    Basophil % 0.4 0.0 - 1.9 %    Differential Method Automated    Basic metabolic panel    Collection Time: 01/29/22  4:16 AM   Result Value Ref Range    Sodium 130 (L) 136 - 145 mmol/L    Potassium 3.6 3.5 - 5.1 mmol/L    Chloride 95 95 - 110 mmol/L    CO2 25 23 - 29 mmol/L    Glucose 138 (H) 70 - 110 mg/dL    BUN 15 8 - 23 mg/dL    Creatinine 1.5 (H) 0.5 - 1.4 mg/dL    Calcium 7.9 (L) 8.7 - 10.5 mg/dL    Anion Gap 10 8 - 16 mmol/L    eGFR if African American 40.7 (A) >60 mL/min/1.73 m^2    eGFR if non  35.3 (A) >60 mL/min/1.73 m^2   POCT glucose    Collection Time: 01/29/22  8:29 AM   Result Value Ref Range    POCT Glucose 158 (H) 70 - 110 mg/dL   POCT glucose    Collection Time: 01/29/22  4:01 PM   Result Value Ref Range    POCT Glucose 141 (H) 70 - 110 mg/dL        Lab Results   Component Value Date    [id number] Negative 01/21/2022       Recent Labs   Lab 01/27/22  0441 01/27/22  0441 01/28/22  0330 01/29/22  0416   WBC 7.41  --  7.88 7.58   LYMPH 29.8  2.2   < > 30.8  2.4 21.1  1.6   HGB 8.1*  --  7.9* 8.4*   HCT 26.1*  --  24.5* 26.6*     --  239 212    < > = values in this interval not displayed.     Recent Labs   Lab 01/27/22  0441 01/27/22  0441 01/27/22  0700  01/28/22  0330 01/29/22  0416   *  --   --  125* 130*   K 3.9  --   --  3.8 3.6   CL 96  --   --  89* 95   CO2 28  --   --  28 25   BUN 24*  --   --  26* 15   CREATININE 1.8*   < > 1.8* 2.1* 1.5*   *  --   --  146* 138*   CALCIUM 7.9*  --   --  8.4* 7.9*   MG 1.5*  --   --  1.5* 1.7   PHOS 4.8*  --   --  5.3* 3.8    < > = values in this interval not displayed.     No results for input(s): ALKPHOS, ALT, AST, ALBUMIN, PROT, BILITOT, INR in the last 168 hours.     No results for input(s): DDIMER, FERRITIN, CRP, LDH, BNP, TROPONINI, CPK in the last 72 hours.    Invalid input(s): PROCALCITONIN    All labs within the last 24 hours were reviewed.     Microbiology:  Microbiology Results (last 7 days)     Procedure Component Value Units Date/Time    Urine culture [[id number]]  (Abnormal)  (Susceptibility) Collected: 01/21/22 1402    Order Status: Completed Specimen: Urine Updated: 01/26/22 1210     Urine Culture, Routine KLEBSIELLA PNEUMONIAE ESBL  > 100,000 cfu/ml      Narrative:      Specimen Source->Urine            Imaging  ECG Results          EKG 12-lead (Final result)  Result time 01/22/22 10:43:30    Final result by Interface, Lab In [facility] (01/22/22 10:43:30)                 Narrative:    Test Reason : R07.9,    Vent. Rate : 090 BPM     Atrial Rate : 090 BPM     P-R Int : 150 ms          QRS Dur : 084 ms      QT Int : 372 ms       P-R-T Axes : 003 007 098 degrees     QTc Int : 455 ms    Normal sinus rhythm  Nonspecific T wave abnormality  Abnormal ECG  When compared with ECG of 20-JAN-2022 19:58,  No significant change was found  Confirmed by Crow Bauer MD ([id number]) on 1/22/2022 10:43:13 AM    Referred By: [email or web address]   SELF           Confirmed By:Crow Bauer MD                              Results for orders placed during the hospital encounter of 11/28/21    Echo    Interpretation Summary  · The left ventricle is normal in size with normal systolic function. The estimated ejection fraction is  60%.  · Normal right ventricular size with normal right ventricular systolic function.  · Normal left ventricular diastolic function.  · Mechanically ventilated; cannot use inferior caval vein diameter to estimate central venous pressure.      X-Ray Chest AP Portable  Narrative: EXAMINATION:  XR CHEST AP PORTABLE    CLINICAL HISTORY:  chest discomfort;    TECHNIQUE:  Single frontal view of the chest was performed.    COMPARISON:  01/20/2022    FINDINGS:  Again noted is right diaphragmatic elevation with a meniscus sign, likely indicating at least a small volume right pleural effusion.  The left lung field and pleural space remains clear.  The heart size appears normal.  There is mild calcification of the aortic knob consistent with atherosclerotic stigmata.  Tunneled right hemodialysis catheter noted.  Impression: No interval detrimental change identified.    Electronically signed by: Rosaline Farah  Date:    01/21/2022  Time:    13:34      All imaging within the last 24 hours was reviewed.       Discharge Planning   FLORENTINO: 1/31/2022     Code Status: Full Code   Is the patient medically ready for discharge?: Yes    Reason for patient still in hospital (select all that apply): Patient trending condition, Treatment and Pending disposition  Discharge Plan A: Skilled Nursing Facility

## 2022-01-30 NOTE — PROGRESS NOTES
[facility] Medicine  Telemedicine Progress Note    Patient Name: Cecile Bowen  MRN: [id number]  Patient Class: IP- Inpatient   Admission Date: 1/21/2022  Length of Stay: 4 days  Attending Physician: Dorothy Kraus MD  Primary Care Provider: Kailey Navarro MD          Subjective:     Principal Problem:Dialysis patient        HPI:  Cecile Bowen maikel 71F with ESRD (on HD), DM2, HTN, breast cancer s/p mastectomy, and recurrent UTI presents with chest heaviness. Pt recently seen in ED for similar chest heaviness. She reports a productive cough and missing some medications. She has a chronic suprapubic catheter of 5 years and is bed bound at baseline, but has a wheelchair for mobility but rarely uses it. She was started on HD 2 months ago and has issues making HD appointments due to transportation. She has missed her last two appointments. Last session was Monday prior to discharge from SNF. She had been at SNF since 12/30/21 when she was admitted after a long hospital stay after being found down at home 11/28/21. During her hospital stay, she was treated for possible STEMI, worsening CKD, metabolic acidosis and required intubation for acute respiratory failure. She was discharged to [facility] and returned home on 1/17/22 after her HD session.    In the ED, she is afebrile and HD stable. Labs positive for dirty UA, hyponatremia, hypokalemia, hypochloridemia, elevated BUN, elevated Cr, and elevated BNP. She was started on Rocephin for UTI and nephrology was consulted for HD. She was admitted to hospital medicine for further management.      Overview/Hospital Course:  Patient admitted for UTI and missed dialysis appointments due to transportation issues. Patient started on Rocephin for UTI and ID was consulted for antibiotic recommendations as patient has history of ESBL and MDRO. Pt transitioned to meropenem followed by Bactrim for 3 days for ESBL UTI. Midline consult ordered  for extended abx coverage. Nephrology was consulted for HD while inpatient. Social work was consulted for discharge planning, working on SNF placement. PT/OT consulted and recommending SNF. Wound care consulted for skin breakdown with recs.     1/28 patient with significant lower extremity edema, Cr 1.8 > 2.1, Na 125. Continue furosemide 160 mg BID per nephrology recs. Per nephrology, even though good urine O/p, patient may benefit from RRT at least 2 times a week for clearances and volume removal. Session of HD planned today. Plan for SNF vs Home with HH, anticipate discharge 1/31.         This encounter was provided through telemedicine.  Patient was transferred to the telemedicine service on:  01/29/2022   The patient location is: 608/608 A admitted 1/21/2022 11:36 AM.  Present with the patient at the time of the telemed/virtual assessment: Telepresenter    Interval History/Overnight Events:   Clinical record since admit reviewed.    Sleepy this a.m. but improved during visit and did receive sumatriptan for migraine last night; headache is improved; she has chronic migraine sometimes occurring daily; she has been tolerating her meals; last bowel movement on 01/28; patient awaiting completion of transportation requirements to dialysis; no issues with her suprapubic catheter    Review of Systems   Constitutional: Negative for fever.   Respiratory: Negative for cough and shortness of breath.    Cardiovascular: Negative for chest pain.   Neurological: Positive for weakness (Generalized).        Inpatient Medications:  Scheduled Meds:   sodium chloride 0.9%   Intravenous Once    sodium chloride 0.9%   Intravenous Once    anastrozole  1 mg Oral Daily    apixaban  2.5 mg Oral BID    atorvastatin  80 mg Oral Daily    cloNIDine  0.2 mg Oral TID    epoetin chloe-ebpx (RETACRIT) injection  50 Units/kg Intravenous Every Tues, Thurs, Sat    ergocalciferol  50,000 Units Oral Q7 Days    famotidine  20 mg Oral Daily     furosemide  160 mg Oral BID    gemfibroziL  600 mg Oral Every Mon, Wed, Fri    levothyroxine  50 mcg Oral Before breakfast    lisinopriL  10 mg Oral Daily    meropenem (MERREM) IVPB  1 g Intravenous Q24H    metoprolol tartrate  25 mg Oral BID    mupirocin   Nasal BID    oxybutynin  10 mg Oral Daily    polyethylene glycol  17 g Oral BID    senna-docusate 8.6-50 mg  1 tablet Oral BID    venlafaxine  150 mg Oral Daily     Continuous Infusions:  PRN Meds:.acetaminophen, butalbital-acetaminophen-caffeine -40 mg, dextrose 50%, dextrose 50%, glucagon (human recombinant), glucose, glucose, heparin (porcine), heparin (porcine), influenza, insulin aspart U-100, melatonin, methocarbamoL, ondansetron, oxyCODONE, sodium chloride 0.9%, sodium chloride 0.9%, sodium chloride 0.9%, sumatriptan      Objective:     Temp:  [96.2 °F (35.7 °C)-98.9 °F (37.2 °C)] 98.9 °F (37.2 °C)  Pulse:  [65-91] 77  Resp:  [16-18] 16  SpO2:  [90 %-95 %] 95 %  BP: (137-169)/(64-77) 149/67      Intake/Output Summary (Last 24 hours) at 1/29/2022 1845  Last data filed at 1/29/2022 1616  Gross per 24 hour   Intake 960 ml   Output --   Net 960 ml        Body mass index is 47.12 kg/m².    Physical Exam  Vitals and nursing note reviewed.   Constitutional:       General: She is not in acute distress.     Appearance: Normal appearance. She is obese.   HENT:      Head: Normocephalic and atraumatic.   Eyes:      General: No scleral icterus.        Right eye: No discharge.         Left eye: No discharge.      Extraocular Movements: Extraocular movements intact.   Cardiovascular:      Rate and Rhythm: Normal rate.   Pulmonary:      Effort: Pulmonary effort is normal. No tachypnea or respiratory distress.   Neurological:      General: No focal deficit present.      Mental Status: She is alert and oriented to person, place, and time.      Cranial Nerves: No cranial nerve deficit.      Motor: No weakness.   Psychiatric:         Attention and Perception:  Attention normal.         Mood and Affect: Mood and affect normal.         Speech: Speech normal.         Behavior: Behavior is cooperative.          Labs:  Recent Results (from the past 24 hour(s))   POCT glucose    Collection Time: 01/28/22  9:36 PM   Result Value Ref Range    POCT Glucose 185 (H) 70 - 110 mg/dL   Magnesium    Collection Time: 01/29/22  4:16 AM   Result Value Ref Range    Magnesium 1.7 1.6 - 2.6 mg/dL   Phosphorus    Collection Time: 01/29/22  4:16 AM   Result Value Ref Range    Phosphorus 3.8 2.7 - 4.5 mg/dL   CBC auto differential    Collection Time: 01/29/22  4:16 AM   Result Value Ref Range    WBC 7.58 3.90 - 12.70 K/uL    RBC 2.87 (L) 4.00 - 5.40 M/uL    Hemoglobin 8.4 (L) 12.0 - 16.0 g/dL    Hematocrit 26.6 (L) 37.0 - 48.5 %    MCV 93 82 - 98 fL    MCH 29.3 27.0 - 31.0 pg    MCHC 31.6 (L) 32.0 - 36.0 g/dL    RDW 14.9 (H) 11.5 - 14.5 %    Platelets 212 150 - 450 K/uL    MPV 9.3 9.2 - 12.9 fL    Immature Granulocytes 1.7 (H) 0.0 - 0.5 %    Gran # (ANC) 4.8 1.8 - 7.7 K/uL    Immature Grans (Abs) 0.13 (H) 0.00 - 0.04 K/uL    Lymph # 1.6 1.0 - 4.8 K/uL    Mono # 0.8 0.3 - 1.0 K/uL    Eos # 0.2 0.0 - 0.5 K/uL    Baso # 0.03 0.00 - 0.20 K/uL    nRBC 0 0 /100 WBC    Gran % 63.0 38.0 - 73.0 %    Lymph % 21.1 18.0 - 48.0 %    Mono % 10.6 4.0 - 15.0 %    Eosinophil % 3.2 0.0 - 8.0 %    Basophil % 0.4 0.0 - 1.9 %    Differential Method Automated    Basic metabolic panel    Collection Time: 01/29/22  4:16 AM   Result Value Ref Range    Sodium 130 (L) 136 - 145 mmol/L    Potassium 3.6 3.5 - 5.1 mmol/L    Chloride 95 95 - 110 mmol/L    CO2 25 23 - 29 mmol/L    Glucose 138 (H) 70 - 110 mg/dL    BUN 15 8 - 23 mg/dL    Creatinine 1.5 (H) 0.5 - 1.4 mg/dL    Calcium 7.9 (L) 8.7 - 10.5 mg/dL    Anion Gap 10 8 - 16 mmol/L    eGFR if African American 40.7 (A) >60 mL/min/1.73 m^2    eGFR if non  35.3 (A) >60 mL/min/1.73 m^2   POCT glucose    Collection Time: 01/29/22  8:29 AM   Result Value Ref  Range    POCT Glucose 158 (H) 70 - 110 mg/dL   POCT glucose    Collection Time: 01/29/22  4:01 PM   Result Value Ref Range    POCT Glucose 141 (H) 70 - 110 mg/dL        Lab Results   Component Value Date    [id number] Negative 01/21/2022       Recent Labs   Lab 01/27/22  0441 01/27/22  0441 01/28/22  0330 01/29/22  0416   WBC 7.41  --  7.88 7.58   LYMPH 29.8  2.2   < > 30.8  2.4 21.1  1.6   HGB 8.1*  --  7.9* 8.4*   HCT 26.1*  --  24.5* 26.6*     --  239 212    < > = values in this interval not displayed.     Recent Labs   Lab 01/27/22 0441 01/27/22 0441 01/27/22  0700 01/28/22  0330 01/29/22  0416   *  --   --  125* 130*   K 3.9  --   --  3.8 3.6   CL 96  --   --  89* 95   CO2 28  --   --  28 25   BUN 24*  --   --  26* 15   CREATININE 1.8*   < > 1.8* 2.1* 1.5*   *  --   --  146* 138*   CALCIUM 7.9*  --   --  8.4* 7.9*   MG 1.5*  --   --  1.5* 1.7   PHOS 4.8*  --   --  5.3* 3.8    < > = values in this interval not displayed.     No results for input(s): ALKPHOS, ALT, AST, ALBUMIN, PROT, BILITOT, INR in the last 168 hours.     No results for input(s): DDIMER, FERRITIN, CRP, LDH, BNP, TROPONINI, CPK in the last 72 hours.    Invalid input(s): PROCALCITONIN    All labs within the last 24 hours were reviewed.     Microbiology:  Microbiology Results (last 7 days)     Procedure Component Value Units Date/Time    Urine culture [[id number]]  (Abnormal)  (Susceptibility) Collected: 01/21/22 1402    Order Status: Completed Specimen: Urine Updated: 01/26/22 1210     Urine Culture, Routine KLEBSIELLA PNEUMONIAE ESBL  > 100,000 cfu/ml      Narrative:      Specimen Source->Urine            Imaging  ECG Results          EKG 12-lead (Final result)  Result time 01/22/22 10:43:30    Final result by Interface, Lab In [facility] (01/22/22 10:43:30)                 Narrative:    Test Reason : R07.9,    Vent. Rate : 090 BPM     Atrial Rate : 090 BPM     P-R Int : 150 ms          QRS Dur : 084 ms      QT Int : 372 ms        P-R-T Axes : 003 007 098 degrees     QTc Int : 455 ms    Normal sinus rhythm  Nonspecific T wave abnormality  Abnormal ECG  When compared with ECG of 20-JAN-2022 19:58,  No significant change was found  Confirmed by Juancarlos DANIEL, Crow HICKS ([id number]) on 1/22/2022 10:43:13 AM    Referred By: ELVA   SELF           Confirmed By:Crow Bauer MD                              Results for orders placed during the hospital encounter of 11/28/21    Echo    Interpretation Summary  · The left ventricle is normal in size with normal systolic function. The estimated ejection fraction is 60%.  · Normal right ventricular size with normal right ventricular systolic function.  · Normal left ventricular diastolic function.  · Mechanically ventilated; cannot use inferior caval vein diameter to estimate central venous pressure.      X-Ray Chest AP Portable  Narrative: EXAMINATION:  XR CHEST AP PORTABLE    CLINICAL HISTORY:  chest discomfort;    TECHNIQUE:  Single frontal view of the chest was performed.    COMPARISON:  01/20/2022    FINDINGS:  Again noted is right diaphragmatic elevation with a meniscus sign, likely indicating at least a small volume right pleural effusion.  The left lung field and pleural space remains clear.  The heart size appears normal.  There is mild calcification of the aortic knob consistent with atherosclerotic stigmata.  Tunneled right hemodialysis catheter noted.  Impression: No interval detrimental change identified.    Electronically signed by: Rosaline Farah  Date:    01/21/2022  Time:    13:34      All imaging within the last 24 hours was reviewed.       Discharge Planning   FLORENTINO: 1/31/2022     Code Status: Full Code   Is the patient medically ready for discharge?: Yes    Reason for patient still in hospital (select all that apply): Patient trending condition, Treatment and Pending disposition  Discharge Plan A: Skilled Nursing Facility            Assessment/Plan:      * Dialysis patient  See  "CKD4      Pressure injury of right buttock, stage 3  Would care consulted  Appreciate recs      Urinary tract infection due to ESBL Klebsiella  See UTI      Anemia of chronic disease  stable      Secondary hypertension  Proteinuria on labs  Added lisinopril 10mg 1/24      Debility  PT/OT consulted  SNF recommended; pt wants OSNF but has a copay for further SNF stay which she cannot provide  -- appreciate SW assistance  -- plan for home with home health once transportation issues resolved (her main reason for missing HD)  -  she has a christina lift at home for transfers    A-fib  Continue apixaban 2.5mg BID  Cont metoprolol      ZAK (acute kidney injury)  Dialysis dependent ZAK  Nephrology consulted, appreciate recs  Suprapubic murillo declogged in ED, see UTI  Low albumin and history of nephrotic syndrome  Plan:  - Trend Cr  - Strict I&Os  - Avoid nephrotoxic agents  - Renally adjust medications  - Prealbumin low  - P/C ratio elevated 9.33  -- History of nephrotic syndrome      Normocytic anemia  At baseline on admission  Anemia of chronic disease    CBC daily  Transfuse if Hbg <7  Checking iron studies, folate, B12, and epo with HD    S/P left mastectomy  See malignant neoplasm of left breast      Requires daily assistance for activities of daily living (ADL) and comfort needs  Cont PT/OT      Malignant neoplasm of left breast, estrogen receptor positive  History of breast cancer status post radical mastectomy on 8/1/2019 on anastrozole   Stable  Continue anastrozole 1mg daily    Cervicogenic migraine  PRNs ordered as she takes at home  -referral for f/u with her neurologist      CKD stage 4 due to type 2 diabetes mellitus  Dialysis dependent    Nephrology consulted, appreciate recs  Last HD 1/25  "S/p HD session today. Pt made 3 lit urine . Will hold off on next HD session and evaluate the need for RRT on daily basis"  Labs pending  Nephrology recs pending for dialysis needs    1/28  -plan for HD today, patient with " good urine output, may require RRT twice weekly  - final nephrology recs pending         Chronic pain  PRN multimodal pain regimen      Long term prescription opiate use   with Percocet and butalbital-aceaminophen-caffeine  See chronic pain, migraines      Morbid obesity due to excess calories  Body mass index is 47.12 kg/m². Morbid obesity complicates all aspects of disease management from diagnostic modalities to treatment. Weight loss encouraged and health benefits explained to patient.     RD consulted  2000 calorie diabetic diet  BOOST    Suprapubic catheter  See neurogenic bladder      Uncontrolled type 2 diabetes mellitus with stage 4 chronic kidney disease, with long-term current use of insulin  A1c (1/4/2022) 5.4%    Diabetic diet  Continue to monitor  Sliding scale NovoLog insulin as needed for hyperglycemia  Glucose monitoring    Major depressive disorder, recurrent episode, moderate  Not on any home medications  Continue to monitor  Consider psych consult if worsening affect        Neurogenic bladder  Continue oxybutynin 10mg daily, furosemide 160mg BID  Suprapubic catheter      Recurrent urinary tract infection  History of ESBL and MDRO with current UTI symptoms, dirty UA, and suprapubic catheter.  Plan:  - consult ID, appreciate recs  --  Plan on 3d of Meropenem (D3/3) then DC on Bactrim DS qod x 3 doses  -- Consider cont meropenem vs Bactrim if patient goes to OSNF  -- Exchange murillo cath after abx are complete  - UCx ESBL  I/D Recs as follows:  Plan: 1. Continue Meropenem x 7d total (from SP change 1/23 as urine still cloudy) - eoc 1/30 2. Rec SNF placement to complete IV abx 3. Contact isolation given prior ESBL 4. weekl CBC and CMP while on meropene 5. Discussed with ID staff 6. Needs urology close fu after dc 7. Will sign off  With end date of antibiotics 1/30/22          Hyponatremia  Chronic hyponatremia 130 baseline, 126 on admission  Secondary to fluid overload, UTI  Nephro consulted for  HD  Na improved      VIJAYA (obstructive sleep apnea)  CPAP QHS      Hyperlipidemia associated with type 2 diabetes mellitus  Continue atorvastatin 80mg daily, gemfibrozil 600mg MWF      Fibromyalgia  PT/OT  Pain regimen      Hypothyroidism  Continue levothyroxine 50mcg daily        VTE Risk Mitigation (From admission, onward)         Ordered     heparin (porcine) injection 1,000 Units  As needed (PRN)         01/28/22 1038     heparin (porcine) injection 1,000 Units  As needed (PRN)         01/24/22 0828     apixaban tablet 2.5 mg  2 times daily         01/21/22 1609     IP VTE HIGH RISK PATIENT  Once         01/21/22 1609     Place sequential compression device  Until discontinued         01/21/22 1609              I have assessed these findings virtually using a telemed platform and with assistance of the bedside nurse.          The attending portion of this evaluation, treatment, and documentation was performed per Dorothy Kraus MD via Telemedicine AudioVisual using the secure [employer] software platform with 2 way audio/video. The provider was located off-site and the patient is located in the hospital. The aforementioned video software was utilized to document the relevant history and physical exam    Dorothy Kraus MD  Department of Hospital Medicine   [facility]

## 2022-01-30 NOTE — ASSESSMENT & PLAN NOTE
Body mass index is 47.12 kg/m². Morbid obesity complicates all aspects of disease management from diagnostic modalities to treatment. Weight loss encouraged and health benefits explained to patient.     RD consulted  2000 calorie diabetic diet  BOOST

## 2022-01-30 NOTE — PT/OT/SLP PROGRESS
Occupational Therapy   Treatment    Name: Cecile Bowen  MRN: [id number]  Admitting Diagnosis:  Dialysis patient       Recommendations:     Discharge Recommendations: nursing facility, skilled  Discharge Equipment Recommendations:  none  Barriers to discharge:   (pt requires increased assistance)    Assessment:     Cecile Bowen is a 71 y.o. female with a medical diagnosis of Dialysis patient.  Sheremains limited in performance of self-care, functional mobility and ADLs and currently not performing tasks at PLOF. Performance deficits affecting function are weakness,impaired functional mobilty,impaired endurance,pain,impaired self care skills,decreased ROM,impaired balance,gait instability. Pt tolerated session well, but continues to require substantial assist with self care tasks, functional mobility and functional transfers. Pt required mod (A) for bed mobility and max (A) x2 with HHA for sit<>stand from EOB. Pt able to stand pivot laterally toward HOB with max assist x2 and HHA requiring verbal cues for sequencing. She would continue to benefit from OT intervention to further her functional (I)ce and safety.      Rehab Prognosis:  Fair; patient would benefit from acute skilled OT services to address these deficits and reach maximum level of function.       Plan:     Patient to be seen 3 x/week to address the above listed problems via self-care/home management,therapeutic activities,therapeutic exercises  · Plan of Care Expires: 02/22/22  · Plan of Care Reviewed with: patient    Subjective     Pain/Comfort:  · Pain Rating 1: 6/10  · Location - Side 1: Bilateral  · Location - Orientation 1: generalized  · Location 1: back  · Pain Addressed 1: Reposition,Distraction,Cessation of Activity  · Pain Rating Post-Intervention 1: 6/10    Objective:     Communicated with: nurse prior to session.  Patient found supine with peripheral IV,murillo catheter upon OT entry to room.    General Precautions: Standard, contact,fall    Orthopedic Precautions:N/A   Braces: N/A  Respiratory Status: Room air     Occupational Performance:     Bed Mobility:    · Patient completed Rolling/Turning to Left with minimum assistance with use of handrail and (A) for trunk management.   · Patient completed Scooting/Bridging with stand by assistance  · Patient completed Supine to Sit with moderate assistance with use of handrail and (A) with trunk management and BLE management.   · Patient completed Sit to Supine with moderate assistance with use of handrail and (A) with trunk management and BLE management.     Functional Mobility/Transfers:  · Patient completed Sit <> Stand Transfer from EOB with maximal assistance and of 2 persons  with  hand-held assist   · Functional Mobility: Pt able to complete lateral stand pivot alongside of the bed toward HOB with max A x2 and HHA.     Activities of Daily Living:  · Grooming: supervision after set up on tray table for pt to complete grooming sitting EOB.   · Upper Body Dressing: moderate assistance to antoine gown over back.       [facility] 6 Click ADL: 16    Treatment & Education:  Pt edu on POC, safety when performing self care tasks, benefit of performing OOB activity, and safety when performing functional transfers and mobility.  - White board updated  - Self care tasks completed-- as noted above     Pt performed dowel exercises with 1# dowel 1 set 10 reps performing shld Flex/ Extn, Horizontal Ab/Adduction, chest presses, and biceps curls.  No (A) needed to complete exercises but brief breaks provided between sets.          Patient left supine with all lines intact and call button in reachEducation:      GOALS:   Multidisciplinary Problems     Occupational Therapy Goals        Problem: Occupational Therapy Goal    Goal Priority Disciplines Outcome Interventions   Occupational Therapy Goal     OT, PT/OT Ongoing, Progressing    Description: Goals to be met by: 02-03-22     Patient will increase functional independence  with ADLs by performing:    UE Dressing with Set-up Assistance.  Grooming while seated with Set-up Assistance.  Toileting from bedside commode with Moderate Assistance for hygiene and clothing management using drop arm commode and slide board  Sitting at edge of bed x15 minutes with Modified [place].  Supine to sit with Minimal Assistance.  Slide board  transfers with Minimal Assistance.  Pt. To be I with HEP to BUE to improve level of endurance (RUE with RTC injury)  Pt. To perform sit to stand from EOB with Mod A                     Time Tracking:     OT Date of Treatment: 01/30/22  OT Start Time: 0802  OT Stop Time: 0825  OT Total Time (min): 23 min    Billable Minutes:Self Care/Home Management 13  Therapeutic Exercise 10          SUZI Visit Number: 2    1/30/2022

## 2022-01-31 LAB
ANION GAP SERPL CALC-SCNC: 6 MMOL/L (ref 8–16)
BASOPHILS # BLD AUTO: 0.04 K/UL (ref 0–0.2)
BASOPHILS NFR BLD: 0.6 % (ref 0–1.9)
BUN SERPL-MCNC: 25 MG/DL (ref 8–23)
CALCIUM SERPL-MCNC: 7.9 MG/DL (ref 8.7–10.5)
CHLORIDE SERPL-SCNC: 94 MMOL/L (ref 95–110)
CO2 SERPL-SCNC: 28 MMOL/L (ref 23–29)
CREAT SERPL-MCNC: 1.7 MG/DL (ref 0.5–1.4)
DIFFERENTIAL METHOD: ABNORMAL
EOSINOPHIL # BLD AUTO: 0.3 K/UL (ref 0–0.5)
EOSINOPHIL NFR BLD: 4.5 % (ref 0–8)
ERYTHROCYTE [DISTWIDTH] IN BLOOD BY AUTOMATED COUNT: 15.4 % (ref 11.5–14.5)
EST. GFR  (AFRICAN AMERICAN): 35 ML/MIN/1.73 M^2
EST. GFR  (NON AFRICAN AMERICAN): 30.3 ML/MIN/1.73 M^2
GLUCOSE SERPL-MCNC: 152 MG/DL (ref 70–110)
HCT VFR BLD AUTO: 24.8 % (ref 37–48.5)
HGB BLD-MCNC: 8 G/DL (ref 12–16)
IMM GRANULOCYTES # BLD AUTO: 0.07 K/UL (ref 0–0.04)
IMM GRANULOCYTES NFR BLD AUTO: 1 % (ref 0–0.5)
LYMPHOCYTES # BLD AUTO: 2.1 K/UL (ref 1–4.8)
LYMPHOCYTES NFR BLD: 31 % (ref 18–48)
MAGNESIUM SERPL-MCNC: 1.4 MG/DL (ref 1.6–2.6)
MCH RBC QN AUTO: 29.6 PG (ref 27–31)
MCHC RBC AUTO-ENTMCNC: 32.3 G/DL (ref 32–36)
MCV RBC AUTO: 92 FL (ref 82–98)
MONOCYTES # BLD AUTO: 0.8 K/UL (ref 0.3–1)
MONOCYTES NFR BLD: 11.3 % (ref 4–15)
NEUTROPHILS # BLD AUTO: 3.6 K/UL (ref 1.8–7.7)
NEUTROPHILS NFR BLD: 51.6 % (ref 38–73)
NRBC BLD-RTO: 0 /100 WBC
PHOSPHATE SERPL-MCNC: 4.6 MG/DL (ref 2.7–4.5)
PLATELET # BLD AUTO: 240 K/UL (ref 150–450)
PMV BLD AUTO: 9.2 FL (ref 9.2–12.9)
POCT GLUCOSE: 152 MG/DL (ref 70–110)
POCT GLUCOSE: 169 MG/DL (ref 70–110)
POCT GLUCOSE: 179 MG/DL (ref 70–110)
POCT GLUCOSE: 225 MG/DL (ref 70–110)
POTASSIUM SERPL-SCNC: 3.9 MMOL/L (ref 3.5–5.1)
RBC # BLD AUTO: 2.7 M/UL (ref 4–5.4)
SODIUM SERPL-SCNC: 128 MMOL/L (ref 136–145)
WBC # BLD AUTO: 6.9 K/UL (ref 3.9–12.7)

## 2022-01-31 PROCEDURE — 83735 ASSAY OF MAGNESIUM: CPT | Mod: HCNC

## 2022-01-31 PROCEDURE — 36415 COLL VENOUS BLD VENIPUNCTURE: CPT | Mod: HCNC

## 2022-01-31 PROCEDURE — 85025 COMPLETE CBC W/AUTO DIFF WBC: CPT | Mod: HCNC

## 2022-01-31 PROCEDURE — 25000003 PHARM REV CODE 250: Mod: HCNC | Performed by: NURSE PRACTITIONER

## 2022-01-31 PROCEDURE — 25000003 PHARM REV CODE 250: Mod: HCNC | Performed by: STUDENT IN AN ORGANIZED HEALTH CARE EDUCATION/TRAINING PROGRAM

## 2022-01-31 PROCEDURE — 63600175 PHARM REV CODE 636 W HCPCS: Mod: HCNC | Performed by: PHYSICIAN ASSISTANT

## 2022-01-31 PROCEDURE — 80048 BASIC METABOLIC PNL TOTAL CA: CPT | Mod: HCNC

## 2022-01-31 PROCEDURE — 11000001 HC ACUTE MED/SURG PRIVATE ROOM: Mod: HCNC

## 2022-01-31 PROCEDURE — 84100 ASSAY OF PHOSPHORUS: CPT | Mod: HCNC

## 2022-01-31 PROCEDURE — 25000003 PHARM REV CODE 250: Mod: HCNC

## 2022-01-31 PROCEDURE — 27000207 HC ISOLATION: Mod: HCNC

## 2022-01-31 RX ORDER — SODIUM CHLORIDE 9 MG/ML
INJECTION, SOLUTION INTRAVENOUS ONCE
Status: DISCONTINUED | OUTPATIENT
Start: 2022-01-31 | End: 2022-02-03

## 2022-01-31 RX ORDER — HEPARIN SODIUM 1000 [USP'U]/ML
1000 INJECTION, SOLUTION INTRAVENOUS; SUBCUTANEOUS
Status: DISCONTINUED | OUTPATIENT
Start: 2022-01-31 | End: 2022-02-03

## 2022-01-31 RX ADMIN — SUMATRIPTAN SUCCINATE 100 MG: 50 TABLET ORAL at 07:01

## 2022-01-31 RX ADMIN — ANASTROZOLE 1 MG: 1 TABLET, COATED ORAL at 08:01

## 2022-01-31 RX ADMIN — CLONIDINE HYDROCHLORIDE 0.2 MG: 0.2 TABLET ORAL at 08:01

## 2022-01-31 RX ADMIN — Medication 6 MG: at 08:01

## 2022-01-31 RX ADMIN — OXYCODONE HYDROCHLORIDE 10 MG: 10 TABLET ORAL at 01:01

## 2022-01-31 RX ADMIN — METOPROLOL TARTRATE 25 MG: 25 TABLET, FILM COATED ORAL at 08:01

## 2022-01-31 RX ADMIN — OXYCODONE HYDROCHLORIDE 10 MG: 10 TABLET ORAL at 08:01

## 2022-01-31 RX ADMIN — FAMOTIDINE 20 MG: 20 TABLET ORAL at 08:01

## 2022-01-31 RX ADMIN — FUROSEMIDE 160 MG: 80 TABLET ORAL at 08:01

## 2022-01-31 RX ADMIN — ATORVASTATIN CALCIUM 80 MG: 20 TABLET, FILM COATED ORAL at 08:01

## 2022-01-31 RX ADMIN — SENNOSIDES AND DOCUSATE SODIUM 1 TABLET: 50; 8.6 TABLET ORAL at 08:01

## 2022-01-31 RX ADMIN — APIXABAN 2.5 MG: 2.5 TABLET, FILM COATED ORAL at 08:01

## 2022-01-31 RX ADMIN — CLONIDINE HYDROCHLORIDE 0.2 MG: 0.2 TABLET ORAL at 02:01

## 2022-01-31 RX ADMIN — MUPIROCIN: 20 OINTMENT TOPICAL at 09:01

## 2022-01-31 RX ADMIN — MEROPENEM AND SODIUM CHLORIDE 1 G: 1 INJECTION, SOLUTION INTRAVENOUS at 12:01

## 2022-01-31 RX ADMIN — GEMFIBROZIL 600 MG: 600 TABLET ORAL at 05:01

## 2022-01-31 RX ADMIN — LEVOTHYROXINE SODIUM 50 MCG: 50 TABLET ORAL at 06:01

## 2022-01-31 RX ADMIN — OXYBUTYNIN CHLORIDE 10 MG: 10 TABLET, FILM COATED, EXTENDED RELEASE ORAL at 08:01

## 2022-01-31 RX ADMIN — LISINOPRIL 10 MG: 10 TABLET ORAL at 08:01

## 2022-01-31 RX ADMIN — VENLAFAXINE HYDROCHLORIDE 150 MG: 75 CAPSULE, EXTENDED RELEASE ORAL at 08:01

## 2022-01-31 NOTE — PROGRESS NOTES
[facility] Medicine  Telemedicine Progress Note    Patient Name: Cecile Bowen  MRN: [id number]  Patient Class: IP- Inpatient   Admission Date: 1/21/2022  Length of Stay: 5 days  Attending Physician: Dorothy Kraus MD  Primary Care Provider: Kailey Navarro MD          Subjective:     Principal Problem:Dialysis patient        HPI:  Cecile Bowen maikel 71F with ESRD (on HD), DM2, HTN, breast cancer s/p mastectomy, and recurrent UTI presents with chest heaviness. Pt recently seen in ED for similar chest heaviness. She reports a productive cough and missing some medications. She has a chronic suprapubic catheter of 5 years and is bed bound at baseline, but has a wheelchair for mobility but rarely uses it. She was started on HD 2 months ago and has issues making HD appointments due to transportation. She has missed her last two appointments. Last session was Monday prior to discharge from SNF. She had been at SNF since 12/30/21 when she was admitted after a long hospital stay after being found down at home 11/28/21. During her hospital stay, she was treated for possible STEMI, worsening CKD, metabolic acidosis and required intubation for acute respiratory failure. She was discharged to [facility] and returned home on 1/17/22 after her HD session.    In the ED, she is afebrile and HD stable. Labs positive for dirty UA, hyponatremia, hypokalemia, hypochloridemia, elevated BUN, elevated Cr, and elevated BNP. She was started on Rocephin for UTI and nephrology was consulted for HD. She was admitted to hospital medicine for further management.      Overview/Hospital Course:  Patient admitted for UTI and missed dialysis appointments due to transportation issues. Patient started on Rocephin for UTI and ID was consulted for antibiotic recommendations as patient has history of ESBL and MDRO. Pt transitioned to meropenem followed by Bactrim for 3 days for ESBL UTI. Midline consult ordered  for extended abx coverage. Nephrology was consulted for HD while inpatient. Social work was consulted for discharge planning, working on SNF placement. PT/OT consulted and recommending SNF. Wound care consulted for skin breakdown with recs.     1/28 patient with significant lower extremity edema, Cr 1.8 > 2.1, Na 125. Continue furosemide 160 mg BID per nephrology recs. Per nephrology, even though good urine O/p, patient may benefit from RRT at least 2 times a week for clearances and volume removal. Session of HD planned today. Plan for SNF vs Home with HH, anticipate discharge 1/31.         This encounter was provided through telemedicine.  Patient was transferred to the telemedicine service on:  01/30/2022   The patient location is: [id number] A admitted 1/21/2022 11:36 AM.  Present with the patient at the time of the telemed/virtual assessment: Telepresenter    Interval History/Overnight Events:     Uneventful night - no HA - not eating much but drinking Boost - will try Novasource renal due to elevated Phos; potassium low this am so repleted gently      Review of Systems   Constitutional: Negative for fever.   Respiratory: Negative for cough and shortness of breath.    Cardiovascular: Negative for chest pain.   Neurological: Positive for weakness (Generalized).        Inpatient Medications:  Scheduled Meds:   sodium chloride 0.9%   Intravenous Once    sodium chloride 0.9%   Intravenous Once    anastrozole  1 mg Oral Daily    apixaban  2.5 mg Oral BID    atorvastatin  80 mg Oral Daily    cloNIDine  0.2 mg Oral TID    epoetin chloe-ebpx (RETACRIT) injection  50 Units/kg Intravenous Every Tues, Thurs, Sat    ergocalciferol  50,000 Units Oral Q7 Days    famotidine  20 mg Oral Daily    furosemide  160 mg Oral BID    gemfibroziL  600 mg Oral Every Mon, Wed, Fri    levothyroxine  50 mcg Oral Before breakfast    lisinopriL  10 mg Oral Daily    meropenem (MERREM) IVPB  1 g Intravenous Q24H    metoprolol  tartrate  25 mg Oral BID    mupirocin   Nasal BID    oxybutynin  10 mg Oral Daily    polyethylene glycol  17 g Oral BID    senna-docusate 8.6-50 mg  1 tablet Oral BID    venlafaxine  150 mg Oral Daily     Continuous Infusions:  PRN Meds:.acetaminophen, butalbital-acetaminophen-caffeine -40 mg, dextrose 50%, dextrose 50%, glucagon (human recombinant), glucose, glucose, heparin (porcine), heparin (porcine), influenza, insulin aspart U-100, melatonin, methocarbamoL, ondansetron, oxyCODONE, sodium chloride 0.9%, sodium chloride 0.9%, sodium chloride 0.9%, sumatriptan      Objective:     Temp:  [96.3 °F (35.7 °C)-99.3 °F (37.4 °C)] 98.9 °F (37.2 °C)  Pulse:  [73-83] 83  Resp:  [16-18] 16  SpO2:  [90 %-96 %] 96 %  BP: (149-169)/(67-75) 159/74      Intake/Output Summary (Last 24 hours) at 1/30/2022 1053  Last data filed at 1/30/2022 0600  Gross per 24 hour   Intake 730 ml   Output 3050 ml   Net -2320 ml        Body mass index is 47.12 kg/m².    Physical Exam  Vitals and nursing note reviewed.   Constitutional:       General: She is not in acute distress.     Appearance: Normal appearance. She is obese.   HENT:      Head: Normocephalic and atraumatic.   Eyes:      General: No scleral icterus.        Right eye: No discharge.         Left eye: No discharge.      Extraocular Movements: Extraocular movements intact.   Cardiovascular:      Rate and Rhythm: Normal rate.   Pulmonary:      Effort: Pulmonary effort is normal. No tachypnea or respiratory distress.   Neurological:      General: No focal deficit present.      Mental Status: She is alert and oriented to person, place, and time.      Cranial Nerves: No cranial nerve deficit.      Motor: No weakness.   Psychiatric:         Attention and Perception: Attention normal.         Mood and Affect: Mood and affect normal.         Speech: Speech normal.         Behavior: Behavior is cooperative.          Labs:  Recent Results (from the past 24 hour(s))   POCT glucose     Collection Time: 01/29/22 11:21 AM   Result Value Ref Range    POCT Glucose 204 (H) 70 - 110 mg/dL   POCT glucose    Collection Time: 01/29/22  4:01 PM   Result Value Ref Range    POCT Glucose 141 (H) 70 - 110 mg/dL   POCT glucose    Collection Time: 01/29/22  9:39 PM   Result Value Ref Range    POCT Glucose 180 (H) 70 - 110 mg/dL   Magnesium    Collection Time: 01/30/22  4:50 AM   Result Value Ref Range    Magnesium 1.6 1.6 - 2.6 mg/dL   Phosphorus    Collection Time: 01/30/22  4:50 AM   Result Value Ref Range    Phosphorus 4.6 (H) 2.7 - 4.5 mg/dL   CBC auto differential    Collection Time: 01/30/22  4:50 AM   Result Value Ref Range    WBC 6.88 3.90 - 12.70 K/uL    RBC 3.05 (L) 4.00 - 5.40 M/uL    Hemoglobin 8.7 (L) 12.0 - 16.0 g/dL    Hematocrit 28.0 (L) 37.0 - 48.5 %    MCV 92 82 - 98 fL    MCH 28.5 27.0 - 31.0 pg    MCHC 31.1 (L) 32.0 - 36.0 g/dL    RDW 15.0 (H) 11.5 - 14.5 %    Platelets 249 150 - 450 K/uL    MPV 9.6 9.2 - 12.9 fL    Immature Granulocytes 1.9 (H) 0.0 - 0.5 %    Gran # (ANC) 3.7 1.8 - 7.7 K/uL    Immature Grans (Abs) 0.13 (H) 0.00 - 0.04 K/uL    Lymph # 2.0 1.0 - 4.8 K/uL    Mono # 0.8 0.3 - 1.0 K/uL    Eos # 0.2 0.0 - 0.5 K/uL    Baso # 0.05 0.00 - 0.20 K/uL    nRBC 0 0 /100 WBC    Gran % 53.8 38.0 - 73.0 %    Lymph % 29.1 18.0 - 48.0 %    Mono % 11.0 4.0 - 15.0 %    Eosinophil % 3.5 0.0 - 8.0 %    Basophil % 0.7 0.0 - 1.9 %    Differential Method Automated    Basic metabolic panel    Collection Time: 01/30/22  4:50 AM   Result Value Ref Range    Sodium 132 (L) 136 - 145 mmol/L    Potassium 3.3 (L) 3.5 - 5.1 mmol/L    Chloride 96 95 - 110 mmol/L    CO2 27 23 - 29 mmol/L    Glucose 135 (H) 70 - 110 mg/dL    BUN 23 8 - 23 mg/dL    Creatinine 1.5 (H) 0.5 - 1.4 mg/dL    Calcium 8.5 (L) 8.7 - 10.5 mg/dL    Anion Gap 9 8 - 16 mmol/L    eGFR if African American 40.7 (A) >60 mL/min/1.73 m^2    eGFR if non  35.3 (A) >60 mL/min/1.73 m^2   POCT glucose    Collection Time: 01/30/22  7:30 AM    Result Value Ref Range    POCT Glucose 161 (H) 70 - 110 mg/dL        Lab Results   Component Value Date    [id number] Negative 01/21/2022       Recent Labs   Lab 01/28/22 0330 01/28/22 0330 01/29/22 0416 01/30/22  0450   WBC 7.88  --  7.58 6.88   LYMPH 30.8  2.4   < > 21.1  1.6 29.1  2.0   HGB 7.9*  --  8.4* 8.7*   HCT 24.5*  --  26.6* 28.0*     --  212 249    < > = values in this interval not displayed.     Recent Labs   Lab 01/28/22 0330 01/29/22 0416 01/30/22  0450   * 130* 132*   K 3.8 3.6 3.3*   CL 89* 95 96   CO2 28 25 27   BUN 26* 15 23   CREATININE 2.1* 1.5* 1.5*   * 138* 135*   CALCIUM 8.4* 7.9* 8.5*   MG 1.5* 1.7 1.6   PHOS 5.3* 3.8 4.6*     No results for input(s): ALKPHOS, ALT, AST, ALBUMIN, PROT, BILITOT, INR in the last 168 hours.     No results for input(s): DDIMER, FERRITIN, CRP, LDH, BNP, TROPONINI, CPK in the last 72 hours.    Invalid input(s): PROCALCITONIN    All labs within the last 24 hours were reviewed.     Microbiology:  Microbiology Results (last 7 days)     Procedure Component Value Units Date/Time    Urine culture [[id number]]  (Abnormal)  (Susceptibility) Collected: 01/21/22 1402    Order Status: Completed Specimen: Urine Updated: 01/26/22 1210     Urine Culture, Routine KLEBSIELLA PNEUMONIAE ESBL  > 100,000 cfu/ml      Narrative:      Specimen Source->Urine            Imaging  ECG Results          EKG 12-lead (Final result)  Result time 01/22/22 10:43:30    Final result by Interface, Lab In [facility] (01/22/22 10:43:30)                 Narrative:    Test Reason : R07.9,    Vent. Rate : 090 BPM     Atrial Rate : 090 BPM     P-R Int : 150 ms          QRS Dur : 084 ms      QT Int : 372 ms       P-R-T Axes : 003 007 098 degrees     QTc Int : 455 ms    Normal sinus rhythm  Nonspecific T wave abnormality  Abnormal ECG  When compared with ECG of 20-JAN-2022 19:58,  No significant change was found  Confirmed by Crow Bauer MD ([id number]) on 1/22/2022 10:43:13  AM    Referred By: [email or web address]   SELF           Confirmed By:Crow Bauer MD                              Results for orders placed during the hospital encounter of 11/28/21    Echo    Interpretation Summary  · The left ventricle is normal in size with normal systolic function. The estimated ejection fraction is 60%.  · Normal right ventricular size with normal right ventricular systolic function.  · Normal left ventricular diastolic function.  · Mechanically ventilated; cannot use inferior caval vein diameter to estimate central venous pressure.      X-Ray Chest AP Portable  Narrative: EXAMINATION:  XR CHEST AP PORTABLE    CLINICAL HISTORY:  chest discomfort;    TECHNIQUE:  Single frontal view of the chest was performed.    COMPARISON:  01/20/2022    FINDINGS:  Again noted is right diaphragmatic elevation with a meniscus sign, likely indicating at least a small volume right pleural effusion.  The left lung field and pleural space remains clear.  The heart size appears normal.  There is mild calcification of the aortic knob consistent with atherosclerotic stigmata.  Tunneled right hemodialysis catheter noted.  Impression: No interval detrimental change identified.    Electronically signed by: Rosaline Farah  Date:    01/21/2022  Time:    13:34      All imaging within the last 24 hours was reviewed.       Discharge Planning   FLORENTINO: 1/31/2022     Code Status: Full Code   Is the patient medically ready for discharge?: Yes    Reason for patient still in hospital (select all that apply): Patient trending condition, Treatment and Pending disposition  Discharge Plan A: Skilled Nursing Facility            Assessment/Plan:      * Dialysis patient  See CKD4      Pressure injury of right buttock, stage 3  Would care consulted  Appreciate recs      Urinary tract infection due to ESBL Klebsiella  See UTI      Anemia of chronic disease  stable      Secondary hypertension  Proteinuria on labs  Added lisinopril 10mg  "1/24      Debility  PT/OT consulted  SNF recommended; pt wants OSNF but has a copay for further SNF stay which she cannot provide  -- appreciate SW assistance  -- plan for home with home health once transportation issues resolved (her main reason for missing HD)  -  she has a christina lift at home for transfers    A-fib  Continue apixaban 2.5mg BID  Cont metoprolol      ZAK (acute kidney injury)  Dialysis dependent ZAK  Nephrology consulted, appreciate recs  Suprapubic murillo declogged in ED, see UTI  Low albumin and history of nephrotic syndrome  Plan:  - Trend Cr  - Strict I&Os  - Avoid nephrotoxic agents  - Renally adjust medications  - Prealbumin low  - P/C ratio elevated 9.33  -- History of nephrotic syndrome      Normocytic anemia  At baseline on admission  Anemia of chronic disease    CBC daily  Transfuse if Hbg <7  Checking iron studies, folate, B12, and epo with HD    S/P left mastectomy  See malignant neoplasm of left breast      Requires daily assistance for activities of daily living (ADL) and comfort needs  Cont PT/OT      Malignant neoplasm of left breast, estrogen receptor positive  History of breast cancer status post radical mastectomy on 8/1/2019 on anastrozole   Stable  Continue anastrozole 1mg daily    Cervicogenic migraine  PRNs ordered as she takes at home  -referral for f/u with her neurologist      CKD stage 4 due to type 2 diabetes mellitus  Dialysis dependent    Nephrology consulted, appreciate recs  Last HD 1/25  "S/p HD session today. Pt made 3 lit urine . Will hold off on next HD session and evaluate the need for RRT on daily basis"  Labs pending  Nephrology recs pending for dialysis needs    1/28  -plan for HD today, patient with good urine output, may require RRT twice weekly  - final nephrology recs pending         Chronic pain  PRN multimodal pain regimen      Long term prescription opiate use   with Percocet and butalbital-aceaminophen-caffeine  See chronic pain, " migraines      Morbid obesity due to excess calories  Body mass index is 47.12 kg/m². Morbid obesity complicates all aspects of disease management from diagnostic modalities to treatment. Weight loss encouraged and health benefits explained to patient.     RD consulted  2000 calorie diabetic diet  BOOST    Suprapubic catheter  See neurogenic bladder      Uncontrolled type 2 diabetes mellitus with stage 4 chronic kidney disease, with long-term current use of insulin  A1c (1/4/2022) 5.4%    Diabetic diet  Continue to monitor  Sliding scale NovoLog insulin as needed for hyperglycemia  Glucose monitoring    Major depressive disorder, recurrent episode, moderate  Not on any home medications  Continue to monitor  Consider psych consult if worsening affect        Neurogenic bladder  Continue oxybutynin 10mg daily, furosemide 160mg BID  Suprapubic catheter      Recurrent urinary tract infection  History of ESBL and MDRO with current UTI symptoms, dirty UA, and suprapubic catheter.  Plan:  - consult ID, appreciate recs  --  Plan on 3d of Meropenem (D3/3) then DC on Bactrim DS qod x 3 doses  -- Consider cont meropenem vs Bactrim if patient goes to OSNF  -- Exchange murillo cath after abx are complete  - UCx ESBL  I/D Recs as follows:  Plan: 1. Continue Meropenem x 7d total (from SP change 1/23 as urine still cloudy) - eoc 1/30 2. Rec SNF placement to complete IV abx 3. Contact isolation given prior ESBL 4. weekl CBC and CMP while on meropene 5. Discussed with ID staff 6. Needs urology close fu after dc 7. Will sign off  With end date of antibiotics 1/30/22          Hyponatremia  Chronic hyponatremia 130 baseline, 126 on admission  Secondary to fluid overload, UTI  Nephro consulted for HD  Na improved      VIJAYA (obstructive sleep apnea)  CPAP QHS      Hyperlipidemia associated with type 2 diabetes mellitus  Continue atorvastatin 80mg daily, gemfibrozil 600mg MWF      Fibromyalgia  PT/OT  Pain regimen      Hypothyroidism  Continue  levothyroxine 50mcg daily        VTE Risk Mitigation (From admission, onward)         Ordered     heparin (porcine) injection 1,000 Units  As needed (PRN)         01/28/22 1038     heparin (porcine) injection 1,000 Units  As needed (PRN)         01/24/22 0828     apixaban tablet 2.5 mg  2 times daily         01/21/22 1609     IP VTE HIGH RISK PATIENT  Once         01/21/22 1609     Place sequential compression device  Until discontinued         01/21/22 1609            I have assessed these findings virtually using a telemed platform and with assistance of the bedside nurse.        The attending portion of this evaluation, treatment, and documentation was performed per Dorothy Kraus MD via Telemedicine AudioVisual using the secure [employer] software platform with 2 way audio/video. The provider was located off-site and the patient is located in the hospital. The aforementioned video software was utilized to document the relevant history and physical exam    Dorothy Kraus MD  Department of Hospital Medicine   [facility] Surg

## 2022-01-31 NOTE — PLAN OF CARE
Continue diabetic ,renal diet, novasource renal, education complete,RD following  Goals: PO to meet 85% of EEN/EPN with ONS by next RD follow up  Nutrition Goal Status: new

## 2022-01-31 NOTE — PLAN OF CARE
Left VM for Cathy with [facility]  ([phone]), to obtain the status of the patient's SNF days/eligibility. Patient may be out of days and therefore, will have to pay copay or out of pocket if DC to SNF.    Checked [employer] and only responses received are denials. Will continue to follow.    Alena Wright, [facility] [place]  [phone]

## 2022-01-31 NOTE — PLAN OF CARE
Problem: Skin Injury Risk Increased  Goal: Skin Health and Integrity  Outcome: Ongoing, Progressing     Problem: Adult Inpatient Plan of Care  Goal: Plan of Care Review  Outcome: Ongoing, Progressing     Problem: Adult Inpatient Plan of Care  Goal: Patient-Specific Goal (Individualized)  Outcome: Ongoing, Progressing     Problem: Adult Inpatient Plan of Care  Goal: Optimal Comfort and Wellbeing  Outcome: Ongoing, Progressing     Problem: Impaired Wound Healing  Goal: Optimal Wound Healing  Outcome: Ongoing, Progressing   Pt is AAO X4, plan of care reviewed with her. Questions answered pt states her understanding. Urine collected and sent to the lab, wound care completed pt did allow to be positioned off of her buttocks twice today, I educated her on the importance of turning q 2 hours and remaining in a different position for more than 30 minutes, she voiced her understanding. Sister at the bedside for most of the afternoon, she has been kept safe free from falls and injury, bed is in the low locked position.

## 2022-01-31 NOTE — PROGRESS NOTES
"[facility] Surg  Adult Nutrition  Progress Note    SUMMARY   Recommendations   Continue diabetic renal diet, novasource renal, education complete,RD following  Goals: PO to meet 85% of EEN/EPN with ONS by next RD follow up  Nutrition Goal Status: new  Communication of RD Recs: other (comment) (POC)    Assessment and Plan  Increased nutrient needs(protein) wound healing in the presence of infection and poor appetite as evidenced by pressure injury, multiple wounds  Ongoing    Decreased nutrient needs related to organ dysfunction as evidenced by need for renal and diabetic diet with education.    Ongoing    Malnutrition Assessment  Not indicated patient appears well nourished                                       Reason for Assessment    Reason For Assessment: length of stay  Diagnosis: renal disease,infection/sepsis  Relevant Medical History: ESRD new to dialysis, DM,pressure injury to buttocks, HTN, VIJAYA, anemia, CKD, debility, hyponatremia, HLD,  Interdisciplinary Rounds: did not attend  General Information Comments: PO 25-50%, NFPE not indicated, patient is well nourished, She was educated on diabetic renal diet earlier in this admit, here last month and then went to SNF before david. Weight loss of 5% may well have been fluid.  Nutrition Discharge Planning: DC on diabetic renal diet    Nutrition Risk Screen    Nutrition Risk Screen: large or nonhealing wound, burn or pressure injury    Nutrition/Diet History    Patient Reported Diet/Restrictions/Preferences: diabetic diet,renal  Typical Food/Fluid Intake: 2-3 meals per day  Spiritual, Cultural Beliefs, Mormon Practices, Values that Affect Care: no  Food Allergies: NKFA  Factors Affecting Nutritional Intake: decreased appetite    Anthropometrics    Temp: 98 °F (36.7 °C)  Height Method: Stated  Height: 5' 3" (160 cm)  Height (inches): 63 in  Weight Method: Estimated  Weight: 116 kg (255 lb 11.7 oz)  Weight (lb): 255.74 lb  Ideal Body Weight (IBW), Female: " 115 lb  % Ideal Body Weight, Female (lb): 222.38 %  BMI (Calculated): 45.3  BMI Grade: greater than 40 - morbid obesity  Weight Loss: intentional  Usual Body Weight (UBW), k.5 kg  % Usual Body Weight: 94.89  % Weight Change From Usual Weight: -5.31 %       Lab/Procedures/Meds    Pertinent Labs Reviewed: reviewed  Pertinent Labs Comments: PO4 4.6, Mg 1.4, Cr 1.7, Na 128, BUN 25, glucose 152, A1c 5.4,  Pertinent Medications Reviewed: reviewed  Pertinent Medications Comments: Epo, lisinopril, lasix, Vit D, senna-docusate, polyethylene glycol, levothyroxine, famotidine, apixaban,         Estimated/Assessed Needs    Weight Used For Calorie Calculations: 116 kg (255 lb 11.7 oz)  Energy Calorie Requirements (kcal): 1654  Energy Need Method: [place]-St Jeor (x 1.0( PAL) 2/2 obesity)  Protein Requirements: 68-78g  Weight Used For Protein Calculations: 52.3 kg (115 lb 4.8 oz) (IBW kg 2/2 obesity)  Fluid Requirements (mL): 1654 or per MD  Estimated Fluid Requirement Method: RDA Method  RDA Method (mL): 1654  CHO Requirement: 206g      Nutrition Prescription Ordered    Current Diet Order:  diabetic , renal  Nutrition Order Comments: 25-50%  Oral Nutrition Supplement: novasource renal TID    Evaluation of Received Nutrient/Fluid Intake    I/O:  to date  Energy Calories Required: meeting needs  Protein Required: not meeting needs  Fluid Required: meeting needs  Comments: LBM   Tolerance: tolerating  % Intake of Estimated Energy Needs: 50 - 75 %  % Meal Intake: 25 - 50 %    Nutrition Risk    Level of Risk/Frequency of Follow-up: low (one time per week)     Monitor and Evaluation    Food and Nutrient Intake: food and beverage intake  Food and Nutrient Adminstration: diet order  Knowledge/Beliefs/Attitudes: food and nutrition knowledge/skill  Physical Activity and Function: nutrition-related ADLs and IADLs  Anthropometric Measurements: weight change  Biochemical Data, Medical Tests and Procedures: electrolyte  and renal panel,gastrointestinal profile,glucose/endocrine profile,inflammatory profile     Nutrition Follow-Up    RD Follow-up?: Yes

## 2022-02-01 LAB
ANION GAP SERPL CALC-SCNC: 12 MMOL/L (ref 8–16)
BASOPHILS # BLD AUTO: 0.04 K/UL (ref 0–0.2)
BASOPHILS NFR BLD: 0.6 % (ref 0–1.9)
BUN SERPL-MCNC: 33 MG/DL (ref 8–23)
CALCIUM SERPL-MCNC: 8.5 MG/DL (ref 8.7–10.5)
CHLORIDE SERPL-SCNC: 95 MMOL/L (ref 95–110)
CO2 SERPL-SCNC: 23 MMOL/L (ref 23–29)
CREAT SERPL-MCNC: 1.8 MG/DL (ref 0.5–1.4)
DIFFERENTIAL METHOD: ABNORMAL
EOSINOPHIL # BLD AUTO: 0.3 K/UL (ref 0–0.5)
EOSINOPHIL NFR BLD: 4.1 % (ref 0–8)
ERYTHROCYTE [DISTWIDTH] IN BLOOD BY AUTOMATED COUNT: 15.1 % (ref 11.5–14.5)
EST. GFR  (AFRICAN AMERICAN): 32.6 ML/MIN/1.73 M^2
EST. GFR  (NON AFRICAN AMERICAN): 28.3 ML/MIN/1.73 M^2
GLUCOSE SERPL-MCNC: 164 MG/DL (ref 70–110)
HCT VFR BLD AUTO: 28.1 % (ref 37–48.5)
HGB BLD-MCNC: 8.9 G/DL (ref 12–16)
IMM GRANULOCYTES # BLD AUTO: 0.07 K/UL (ref 0–0.04)
IMM GRANULOCYTES NFR BLD AUTO: 1 % (ref 0–0.5)
LYMPHOCYTES # BLD AUTO: 2.2 K/UL (ref 1–4.8)
LYMPHOCYTES NFR BLD: 29.7 % (ref 18–48)
MAGNESIUM SERPL-MCNC: 1.5 MG/DL (ref 1.6–2.6)
MCH RBC QN AUTO: 29.1 PG (ref 27–31)
MCHC RBC AUTO-ENTMCNC: 31.7 G/DL (ref 32–36)
MCV RBC AUTO: 92 FL (ref 82–98)
MONOCYTES # BLD AUTO: 0.8 K/UL (ref 0.3–1)
MONOCYTES NFR BLD: 10.6 % (ref 4–15)
NEUTROPHILS # BLD AUTO: 3.9 K/UL (ref 1.8–7.7)
NEUTROPHILS NFR BLD: 54 % (ref 38–73)
NRBC BLD-RTO: 0 /100 WBC
PHOSPHATE SERPL-MCNC: 5.2 MG/DL (ref 2.7–4.5)
PLATELET # BLD AUTO: 259 K/UL (ref 150–450)
PMV BLD AUTO: 9.5 FL (ref 9.2–12.9)
POCT GLUCOSE: 159 MG/DL (ref 70–110)
POCT GLUCOSE: 170 MG/DL (ref 70–110)
POCT GLUCOSE: 179 MG/DL (ref 70–110)
POCT GLUCOSE: 195 MG/DL (ref 70–110)
POTASSIUM SERPL-SCNC: 4.6 MMOL/L (ref 3.5–5.1)
RBC # BLD AUTO: 3.06 M/UL (ref 4–5.4)
SODIUM SERPL-SCNC: 130 MMOL/L (ref 136–145)
WBC # BLD AUTO: 7.25 K/UL (ref 3.9–12.7)

## 2022-02-01 PROCEDURE — 36415 COLL VENOUS BLD VENIPUNCTURE: CPT | Mod: HCNC

## 2022-02-01 PROCEDURE — 63600175 PHARM REV CODE 636 W HCPCS: Mod: HCNC | Performed by: STUDENT IN AN ORGANIZED HEALTH CARE EDUCATION/TRAINING PROGRAM

## 2022-02-01 PROCEDURE — 25000003 PHARM REV CODE 250: Mod: HCNC

## 2022-02-01 PROCEDURE — 85025 COMPLETE CBC W/AUTO DIFF WBC: CPT | Mod: HCNC

## 2022-02-01 PROCEDURE — 25000003 PHARM REV CODE 250: Mod: HCNC | Performed by: STUDENT IN AN ORGANIZED HEALTH CARE EDUCATION/TRAINING PROGRAM

## 2022-02-01 PROCEDURE — 11000001 HC ACUTE MED/SURG PRIVATE ROOM: Mod: HCNC

## 2022-02-01 PROCEDURE — 63600175 PHARM REV CODE 636 W HCPCS: Mod: JG,HCNC | Performed by: NURSE PRACTITIONER

## 2022-02-01 PROCEDURE — 80048 BASIC METABOLIC PNL TOTAL CA: CPT | Mod: HCNC

## 2022-02-01 PROCEDURE — 83735 ASSAY OF MAGNESIUM: CPT | Mod: HCNC

## 2022-02-01 PROCEDURE — 25000003 PHARM REV CODE 250: Mod: HCNC | Performed by: INTERNAL MEDICINE

## 2022-02-01 PROCEDURE — 25000003 PHARM REV CODE 250: Mod: HCNC | Performed by: NURSE PRACTITIONER

## 2022-02-01 PROCEDURE — 84100 ASSAY OF PHOSPHORUS: CPT | Mod: HCNC

## 2022-02-01 PROCEDURE — 27000207 HC ISOLATION: Mod: HCNC

## 2022-02-01 PROCEDURE — 80100014 HC HEMODIALYSIS 1:1: Mod: HCNC

## 2022-02-01 RX ORDER — SODIUM CHLORIDE 9 MG/ML
INJECTION, SOLUTION INTRAVENOUS ONCE
Status: COMPLETED | OUTPATIENT
Start: 2022-02-01 | End: 2022-02-01

## 2022-02-01 RX ORDER — HEPARIN SODIUM 1000 [USP'U]/ML
1000 INJECTION, SOLUTION INTRAVENOUS; SUBCUTANEOUS
Status: DISCONTINUED | OUTPATIENT
Start: 2022-02-01 | End: 2022-02-03

## 2022-02-01 RX ORDER — LANOLIN ALCOHOL/MO/W.PET/CERES
400 CREAM (GRAM) TOPICAL ONCE
Status: COMPLETED | OUTPATIENT
Start: 2022-02-01 | End: 2022-02-01

## 2022-02-01 RX ADMIN — CLONIDINE HYDROCHLORIDE 0.2 MG: 0.2 TABLET ORAL at 01:02

## 2022-02-01 RX ADMIN — BUTALBITAL, ACETAMINOPHEN, AND CAFFEINE 1 TABLET: 50; 325; 40 TABLET ORAL at 09:02

## 2022-02-01 RX ADMIN — APIXABAN 2.5 MG: 2.5 TABLET, FILM COATED ORAL at 09:02

## 2022-02-01 RX ADMIN — ANASTROZOLE 1 MG: 1 TABLET, COATED ORAL at 01:02

## 2022-02-01 RX ADMIN — OXYCODONE HYDROCHLORIDE 10 MG: 10 TABLET ORAL at 01:02

## 2022-02-01 RX ADMIN — Medication 400 MG: at 02:02

## 2022-02-01 RX ADMIN — VENLAFAXINE HYDROCHLORIDE 150 MG: 75 CAPSULE, EXTENDED RELEASE ORAL at 01:02

## 2022-02-01 RX ADMIN — LISINOPRIL 10 MG: 10 TABLET ORAL at 01:02

## 2022-02-01 RX ADMIN — SODIUM CHLORIDE: 0.9 INJECTION, SOLUTION INTRAVENOUS at 08:02

## 2022-02-01 RX ADMIN — HEPARIN SODIUM 1000 UNITS: 1000 INJECTION, SOLUTION INTRAVENOUS; SUBCUTANEOUS at 11:02

## 2022-02-01 RX ADMIN — FUROSEMIDE 160 MG: 80 TABLET ORAL at 09:02

## 2022-02-01 RX ADMIN — ATORVASTATIN CALCIUM 80 MG: 20 TABLET, FILM COATED ORAL at 01:02

## 2022-02-01 RX ADMIN — CLONIDINE HYDROCHLORIDE 0.2 MG: 0.2 TABLET ORAL at 09:02

## 2022-02-01 RX ADMIN — SENNOSIDES AND DOCUSATE SODIUM 1 TABLET: 50; 8.6 TABLET ORAL at 01:02

## 2022-02-01 RX ADMIN — OXYBUTYNIN CHLORIDE 10 MG: 10 TABLET, FILM COATED, EXTENDED RELEASE ORAL at 09:02

## 2022-02-01 RX ADMIN — EPOETIN ALFA-EPBX 6000 UNITS: 10000 INJECTION, SOLUTION INTRAVENOUS; SUBCUTANEOUS at 11:02

## 2022-02-01 RX ADMIN — CLONIDINE HYDROCHLORIDE 0.2 MG: 0.2 TABLET ORAL at 05:02

## 2022-02-01 RX ADMIN — FUROSEMIDE 160 MG: 80 TABLET ORAL at 01:02

## 2022-02-01 RX ADMIN — METOPROLOL TARTRATE 25 MG: 25 TABLET, FILM COATED ORAL at 01:02

## 2022-02-01 RX ADMIN — MUPIROCIN: 20 OINTMENT TOPICAL at 09:02

## 2022-02-01 RX ADMIN — LEVOTHYROXINE SODIUM 50 MCG: 50 TABLET ORAL at 05:02

## 2022-02-01 RX ADMIN — APIXABAN 2.5 MG: 2.5 TABLET, FILM COATED ORAL at 01:02

## 2022-02-01 RX ADMIN — FAMOTIDINE 20 MG: 20 TABLET ORAL at 01:02

## 2022-02-01 RX ADMIN — METOPROLOL TARTRATE 25 MG: 25 TABLET, FILM COATED ORAL at 09:02

## 2022-02-01 NOTE — PROGRESS NOTES
[facility] Medicine  Telemedicine Progress Note    Patient Name: Cecile Bowen  MRN: [id number]  Patient Class: IP- Inpatient   Admission Date: 1/21/2022  Length of Stay: 6 days  Attending Physician: Dorothy Kraus MD  Primary Care Provider: Kailey Navarro MD          Subjective:     Principal Problem:Dialysis patient        HPI:  Cecile Bowen maikel 71F with ESRD (on HD), DM2, HTN, breast cancer s/p mastectomy, and recurrent UTI presents with chest heaviness. Pt recently seen in ED for similar chest heaviness. She reports a productive cough and missing some medications. She has a chronic suprapubic catheter of 5 years and is bed bound at baseline, but has a wheelchair for mobility but rarely uses it. She was started on HD 2 months ago and has issues making HD appointments due to transportation. She has missed her last two appointments. Last session was Monday prior to discharge from SNF. She had been at SNF since 12/30/21 when she was admitted after a long hospital stay after being found down at home 11/28/21. During her hospital stay, she was treated for possible STEMI, worsening CKD, metabolic acidosis and required intubation for acute respiratory failure. She was discharged to [facility] and returned home on 1/17/22 after her HD session.    In the ED, she is afebrile and HD stable. Labs positive for dirty UA, hyponatremia, hypokalemia, hypochloridemia, elevated BUN, elevated Cr, and elevated BNP. She was started on Rocephin for UTI and nephrology was consulted for HD. She was admitted to hospital medicine for further management.      Overview/Hospital Course:  Patient admitted for UTI and missed dialysis appointments due to transportation issues. Patient started on Rocephin for UTI and ID was consulted for antibiotic recommendations as patient has history of ESBL and MDRO. Pt transitioned to meropenem followed by Bactrim for 3 days for ESBL UTI. Midline consult ordered  for extended abx coverage. Nephrology was consulted for HD while inpatient. Social work was consulted for discharge planning, working on SNF placement. PT/OT consulted and recommending SNF. Wound care consulted for skin breakdown with recs.     1/28 patient with significant lower extremity edema, Cr 1.8 > 2.1, Na 125. Continue furosemide 160 mg BID per nephrology recs. Per nephrology, even though good urine O/p, patient may benefit from RRT at least 2 times a week for clearances and volume removal. Session of HD planned today. Plan for SNF vs Home with HH, anticipate discharge 1/31.         This encounter was provided through telemedicine.  Patient was transferred to the telemedicine service on:  01/31/2022   The patient location is: 608/608 A admitted 1/21/2022 11:36 AM.  Present with the patient at the time of the telemed/virtual assessment: Telepresenter    Interval History/Overnight Events:     MOELLER last night relieved with sumatriptan; otherwise feeling well; dialysis planned for tomorrow; awaiting coordination of home services for discharge      Review of Systems   Constitutional: Negative for fever.   Respiratory: Negative for cough and shortness of breath.    Cardiovascular: Negative for chest pain.   Neurological: Positive for weakness (Generalized).        Inpatient Medications:  Scheduled Meds:   sodium chloride 0.9%   Intravenous Once    sodium chloride 0.9%   Intravenous Once    sodium chloride 0.9%   Intravenous Once    anastrozole  1 mg Oral Daily    apixaban  2.5 mg Oral BID    atorvastatin  80 mg Oral Daily    cloNIDine  0.2 mg Oral TID    epoetin chloe-ebpx (RETACRIT) injection  50 Units/kg Intravenous Every Tues, Thurs, Sat    ergocalciferol  50,000 Units Oral Q7 Days    famotidine  20 mg Oral Daily    furosemide  160 mg Oral BID    gemfibroziL  600 mg Oral Every Mon, Wed, Fri    levothyroxine  50 mcg Oral Before breakfast    lisinopriL  10 mg Oral Daily    metoprolol tartrate  25 mg  Oral BID    mupirocin   Nasal BID    oxybutynin  10 mg Oral Daily    polyethylene glycol  17 g Oral BID    senna-docusate 8.6-50 mg  1 tablet Oral BID    venlafaxine  150 mg Oral Daily     Continuous Infusions:  PRN Meds:.acetaminophen, butalbital-acetaminophen-caffeine -40 mg, dextrose 50%, dextrose 50%, glucagon (human recombinant), glucose, glucose, heparin (porcine), heparin (porcine), heparin (porcine), influenza, insulin aspart U-100, melatonin, methocarbamoL, ondansetron, oxyCODONE, sodium chloride 0.9%, sodium chloride 0.9%, sodium chloride 0.9%, sodium chloride 0.9%, sumatriptan      Objective:     Temp:  [98 °F (36.7 °C)-99 °F (37.2 °C)] 99 °F (37.2 °C)  Pulse:  [69-74] 72  Resp:  [16-19] 19  SpO2:  [94 %-97 %] 94 %  BP: (119-142)/(55-64) 119/55      Intake/Output Summary (Last 24 hours) at 1/31/2022 2103  Last data filed at 1/31/2022 1800  Gross per 24 hour   Intake 1100 ml   Output 1800 ml   Net -700 ml        Body mass index is 45.3 kg/m².    Physical Exam  Vitals and nursing note reviewed.   Constitutional:       General: She is not in acute distress.     Appearance: Normal appearance. She is obese.   HENT:      Head: Normocephalic and atraumatic.   Eyes:      General: No scleral icterus.        Right eye: No discharge.         Left eye: No discharge.      Extraocular Movements: Extraocular movements intact.   Cardiovascular:      Rate and Rhythm: Normal rate.   Pulmonary:      Effort: Pulmonary effort is normal. No tachypnea or respiratory distress.   Neurological:      General: No focal deficit present.      Mental Status: She is alert and oriented to person, place, and time.      Cranial Nerves: No cranial nerve deficit.      Motor: No weakness.   Psychiatric:         Attention and Perception: Attention normal.         Mood and Affect: Mood and affect normal.         Speech: Speech normal.         Behavior: Behavior is cooperative.          Labs:  Recent Results (from the past 24 hour(s))    POCT glucose    Collection Time: 01/30/22  9:08 PM   Result Value Ref Range    POCT Glucose 177 (H) 70 - 110 mg/dL   Magnesium    Collection Time: 01/31/22  4:50 AM   Result Value Ref Range    Magnesium 1.4 (L) 1.6 - 2.6 mg/dL   Phosphorus    Collection Time: 01/31/22  4:50 AM   Result Value Ref Range    Phosphorus 4.6 (H) 2.7 - 4.5 mg/dL   CBC auto differential    Collection Time: 01/31/22  4:50 AM   Result Value Ref Range    WBC 6.90 3.90 - 12.70 K/uL    RBC 2.70 (L) 4.00 - 5.40 M/uL    Hemoglobin 8.0 (L) 12.0 - 16.0 g/dL    Hematocrit 24.8 (L) 37.0 - 48.5 %    MCV 92 82 - 98 fL    MCH 29.6 27.0 - 31.0 pg    MCHC 32.3 32.0 - 36.0 g/dL    RDW 15.4 (H) 11.5 - 14.5 %    Platelets 240 150 - 450 K/uL    MPV 9.2 9.2 - 12.9 fL    Immature Granulocytes 1.0 (H) 0.0 - 0.5 %    Gran # (ANC) 3.6 1.8 - 7.7 K/uL    Immature Grans (Abs) 0.07 (H) 0.00 - 0.04 K/uL    Lymph # 2.1 1.0 - 4.8 K/uL    Mono # 0.8 0.3 - 1.0 K/uL    Eos # 0.3 0.0 - 0.5 K/uL    Baso # 0.04 0.00 - 0.20 K/uL    nRBC 0 0 /100 WBC    Gran % 51.6 38.0 - 73.0 %    Lymph % 31.0 18.0 - 48.0 %    Mono % 11.3 4.0 - 15.0 %    Eosinophil % 4.5 0.0 - 8.0 %    Basophil % 0.6 0.0 - 1.9 %    Differential Method Automated    Basic metabolic panel    Collection Time: 01/31/22  4:50 AM   Result Value Ref Range    Sodium 128 (L) 136 - 145 mmol/L    Potassium 3.9 3.5 - 5.1 mmol/L    Chloride 94 (L) 95 - 110 mmol/L    CO2 28 23 - 29 mmol/L    Glucose 152 (H) 70 - 110 mg/dL    BUN 25 (H) 8 - 23 mg/dL    Creatinine 1.7 (H) 0.5 - 1.4 mg/dL    Calcium 7.9 (L) 8.7 - 10.5 mg/dL    Anion Gap 6 (L) 8 - 16 mmol/L    eGFR if African American 35.0 (A) >60 mL/min/1.73 m^2    eGFR if non  30.3 (A) >60 mL/min/1.73 m^2   POCT glucose    Collection Time: 01/31/22  7:36 AM   Result Value Ref Range    POCT Glucose 152 (H) 70 - 110 mg/dL   POCT glucose    Collection Time: 01/31/22 12:10 PM   Result Value Ref Range    POCT Glucose 169 (H) 70 - 110 mg/dL   POCT glucose     Collection Time: 01/31/22  4:06 PM   Result Value Ref Range    POCT Glucose 225 (H) 70 - 110 mg/dL   POCT glucose    Collection Time: 01/31/22  8:10 PM   Result Value Ref Range    POCT Glucose 179 (H) 70 - 110 mg/dL        Lab Results   Component Value Date    [id number] Negative 01/21/2022       Recent Labs   Lab 01/29/22  0416 01/29/22  0416 01/30/22  0450 01/31/22  0450   WBC 7.58  --  6.88 6.90   LYMPH 21.1  1.6   < > 29.1  2.0 31.0  2.1   HGB 8.4*  --  8.7* 8.0*   HCT 26.6*  --  28.0* 24.8*     --  249 240    < > = values in this interval not displayed.     Recent Labs   Lab 01/29/22 0416 01/30/22 0450 01/31/22  0450   * 132* 128*   K 3.6 3.3* 3.9   CL 95 96 94*   CO2 25 27 28   BUN 15 23 25*   CREATININE 1.5* 1.5* 1.7*   * 135* 152*   CALCIUM 7.9* 8.5* 7.9*   MG 1.7 1.6 1.4*   PHOS 3.8 4.6* 4.6*     No results for input(s): ALKPHOS, ALT, AST, ALBUMIN, PROT, BILITOT, INR in the last 168 hours.     No results for input(s): DDIMER, FERRITIN, CRP, LDH, BNP, TROPONINI, CPK in the last 72 hours.    Invalid input(s): PROCALCITONIN    All labs within the last 24 hours were reviewed.     Microbiology:  Microbiology Results (last 7 days)     Procedure Component Value Units Date/Time    Urine culture [[id number]]  (Abnormal)  (Susceptibility) Collected: 01/21/22 1402    Order Status: Completed Specimen: Urine Updated: 01/26/22 1210     Urine Culture, Routine KLEBSIELLA PNEUMONIAE ESBL  > 100,000 cfu/ml      Narrative:      Specimen Source->Urine            Imaging  ECG Results          EKG 12-lead (Final result)  Result time 01/22/22 10:43:30    Final result by Interface, Lab In [facility] (01/22/22 10:43:30)                 Narrative:    Test Reason : R07.9,    Vent. Rate : 090 BPM     Atrial Rate : 090 BPM     P-R Int : 150 ms          QRS Dur : 084 ms      QT Int : 372 ms       P-R-T Axes : 003 007 098 degrees     QTc Int : 455 ms    Normal sinus rhythm  Nonspecific T wave abnormality  Abnormal  ECG  When compared with ECG of 20-JAN-2022 19:58,  No significant change was found  Confirmed by Juancarlos DANIEL, Crow HICKS ([id number]) on 1/22/2022 10:43:13 AM    Referred By: [email or web address]   SELF           Confirmed By:Crow Bauer MD                              Results for orders placed during the hospital encounter of 11/28/21    Echo    Interpretation Summary  · The left ventricle is normal in size with normal systolic function. The estimated ejection fraction is 60%.  · Normal right ventricular size with normal right ventricular systolic function.  · Normal left ventricular diastolic function.  · Mechanically ventilated; cannot use inferior caval vein diameter to estimate central venous pressure.      X-Ray Chest AP Portable  Narrative: EXAMINATION:  XR CHEST AP PORTABLE    CLINICAL HISTORY:  chest discomfort;    TECHNIQUE:  Single frontal view of the chest was performed.    COMPARISON:  01/20/2022    FINDINGS:  Again noted is right diaphragmatic elevation with a meniscus sign, likely indicating at least a small volume right pleural effusion.  The left lung field and pleural space remains clear.  The heart size appears normal.  There is mild calcification of the aortic knob consistent with atherosclerotic stigmata.  Tunneled right hemodialysis catheter noted.  Impression: No interval detrimental change identified.    Electronically signed by: Rosaline aFrah  Date:    01/21/2022  Time:    13:34      All imaging within the last 24 hours was reviewed.       Discharge Planning   FLORENTINO: 2/1/2022     Code Status: Full Code   Is the patient medically ready for discharge?: Yes    Reason for patient still in hospital (select all that apply): Patient trending condition, Treatment and Pending disposition  Discharge Plan A: Skilled Nursing Facility            Assessment/Plan:      * Dialysis patient  See CKD4  -patient has had trouble getting to dialysis from home and is unable to pay co-pay for skilled nursing; she is having a ramp built  "at her home and transportation will need to be arranged prior to discharge    Pressure injury of right buttock, stage 3  Would care consulted  Appreciate recs      Urinary tract infection due to ESBL Klebsiella  See UTI      Anemia of chronic disease  stable      Secondary hypertension  Proteinuria on labs  Added lisinopril 10mg 1/24      Debility  PT/OT consulted  SNF recommended; pt wants OSNF but has a copay for further SNF stay which she cannot provide  -- appreciate SW assistance  -- plan for home with home health once transportation issues resolved (her main reason for missing HD)  -  she has a christina lift at home for transfers    A-fib  Continue apixaban 2.5mg BID  Cont metoprolol      ZAK (acute kidney injury)  Dialysis dependent ZAK  Nephrology consulted, appreciate recs  Suprapubic murillo declogged in ED, see UTI  Low albumin and history of nephrotic syndrome  Plan:  - Trend Cr  - Strict I&Os  - Avoid nephrotoxic agents  - Renally adjust medications  - Prealbumin low  - P/C ratio elevated 9.33  -- History of nephrotic syndrome      Normocytic anemia  At baseline on admission  Anemia of chronic disease    CBC daily  Transfuse if Hbg <7  Checking iron studies, folate, B12, and epo with HD    S/P left mastectomy  See malignant neoplasm of left breast      Requires daily assistance for activities of daily living (ADL) and comfort needs  Cont PT/OT      Malignant neoplasm of left breast, estrogen receptor positive  History of breast cancer status post radical mastectomy on 8/1/2019 on anastrozole   Stable  Continue anastrozole 1mg daily    Cervicogenic migraine  PRNs ordered as she takes at home  -referral for f/u with her neurologist      CKD stage 4 due to type 2 diabetes mellitus  Dialysis dependent    Nephrology consulted, appreciate recs  Last HD 1/25  "S/p HD session today. Pt made 3 lit urine . Will hold off on next HD session and evaluate the need for RRT on daily basis"  Labs pending  Nephrology recs " pending for dialysis needs    1/28  -plan for HD today, patient with good urine output, may require RRT twice weekly  - final nephrology recs pending         Chronic pain  PRN multimodal pain regimen      Long term prescription opiate use   with Percocet and butalbital-aceaminophen-caffeine  See chronic pain, migraines      Morbid obesity due to excess calories  Body mass index is 47.12 kg/m². Morbid obesity complicates all aspects of disease management from diagnostic modalities to treatment. Weight loss encouraged and health benefits explained to patient.     RD consulted  2000 calorie diabetic diet  BOOST    Suprapubic catheter  See neurogenic bladder      Uncontrolled type 2 diabetes mellitus with stage 4 chronic kidney disease, with long-term current use of insulin  A1c (1/4/2022) 5.4%    Diabetic diet  Continue to monitor  Sliding scale NovoLog insulin as needed for hyperglycemia  Glucose monitoring    Major depressive disorder, recurrent episode, moderate  Not on any home medications  Continue to monitor  Consider psych consult if worsening affect        Neurogenic bladder  Continue oxybutynin 10mg daily, furosemide 160mg BID  Suprapubic catheter      Recurrent urinary tract infection  History of ESBL and MDRO with current UTI symptoms, dirty UA, and suprapubic catheter.  Plan:  - consult ID, appreciate recs  --  Plan on 3d of Meropenem (D3/3) then DC on Bactrim DS qod x 3 doses  -- Consider cont meropenem vs Bactrim if patient goes to OSNF  -- Exchange murillo cath after abx are complete  - UCx ESBL  I/D Recs as follows:  Plan: 1. Continue Meropenem x 7d total (from SP change 1/23 as urine still cloudy) - eoc 1/30 2. Rec SNF placement to complete IV abx 3. Contact isolation given prior ESBL 4. weekl CBC and CMP while on meropene 5. Discussed with ID staff 6. Needs urology close fu after dc 7. Will sign off  With end date of antibiotics 1/30/22          Hyponatremia  Chronic hyponatremia 130 baseline, 126 on  admission  Secondary to fluid overload, UTI  Nephro consulted for HD  Na improved      VIJAYA (obstructive sleep apnea)  CPAP QHS      Hyperlipidemia associated with type 2 diabetes mellitus  Continue atorvastatin 80mg daily, gemfibrozil 600mg MWF      Fibromyalgia  PT/OT  Pain regimen      Hypothyroidism  Continue levothyroxine 50mcg daily        VTE Risk Mitigation (From admission, onward)         Ordered     heparin (porcine) injection 1,000 Units  As needed (PRN)         01/31/22 0715     heparin (porcine) injection 1,000 Units  As needed (PRN)         01/28/22 1038     heparin (porcine) injection 1,000 Units  As needed (PRN)         01/24/22 0828     apixaban tablet 2.5 mg  2 times daily         01/21/22 1609     IP VTE HIGH RISK PATIENT  Once         01/21/22 1609     Place sequential compression device  Until discontinued         01/21/22 1609              I have assessed these findings virtually using a telemed platform and with assistance of the bedside nurse.          The attending portion of this evaluation, treatment, and documentation was performed per Dorothy Kraus MD via Telemedicine AudioVisual using the secure Reimage software platform with 2 way audio/video. The provider was located off-site and the patient is located in the hospital. The aforementioned video software was utilized to document the relevant history and physical exam    Dorothy rKaus MD  Department of Hospital Medicine   [facility]

## 2022-02-01 NOTE — PLAN OF CARE
Met with patient at bedside. She reported that her ramp has been built but that she cannot transfer independently or with the help of her sister to a wheelchair or wheelchair van. She reports that she uses a christina lift. Wheelchair van transport does not typically go into the residence to transfer patient from bed to wheelchair. They typically  patient outside residence. Will continue to follow.     Alena Wright, [facility] [place]  [phone]

## 2022-02-01 NOTE — SUBJECTIVE & OBJECTIVE
This encounter was provided through telemedicine.  Patient was transferred to the telemedicine service on:  02/01/2022   The patient location is: 608/608 A admitted 1/21/2022 11:36 AM.  Present with the patient at the time of the telemed/virtual assessment: Telepresenter    Interval History/Overnight Events:     Seen in HD; HA again last pm but alleviated with sumatriptan; her wraps her house is built and anticipate discharge once transportation requirements are confirmed    Review of Systems   Constitutional: Negative for fever.   Respiratory: Negative for cough and shortness of breath.    Cardiovascular: Negative for chest pain.   Neurological: Positive for weakness (Generalized).        Inpatient Medications:  Scheduled Meds:   sodium chloride 0.9%   Intravenous Once    sodium chloride 0.9%   Intravenous Once    sodium chloride 0.9%   Intravenous Once    anastrozole  1 mg Oral Daily    apixaban  2.5 mg Oral BID    atorvastatin  80 mg Oral Daily    cloNIDine  0.2 mg Oral TID    epoetin chloe-ebpx (RETACRIT) injection  50 Units/kg Intravenous Every Tues, Thurs, Sat    ergocalciferol  50,000 Units Oral Q7 Days    famotidine  20 mg Oral Daily    furosemide  160 mg Oral BID    gemfibroziL  600 mg Oral Every Mon, Wed, Fri    levothyroxine  50 mcg Oral Before breakfast    lisinopriL  10 mg Oral Daily    metoprolol tartrate  25 mg Oral BID    mupirocin   Nasal BID    oxybutynin  10 mg Oral Daily    polyethylene glycol  17 g Oral BID    senna-docusate 8.6-50 mg  1 tablet Oral BID    venlafaxine  150 mg Oral Daily     Continuous Infusions:  PRN Meds:.acetaminophen, butalbital-acetaminophen-caffeine -40 mg, dextrose 50%, dextrose 50%, glucagon (human recombinant), glucose, glucose, heparin (porcine), heparin (porcine), heparin (porcine), heparin (porcine), influenza, insulin aspart U-100, melatonin, methocarbamoL, ondansetron, oxyCODONE, sodium chloride 0.9%, sodium chloride 0.9%, sodium chloride  0.9%, sodium chloride 0.9%, sodium chloride 0.9%, sumatriptan      Objective:     Temp:  [97.7 °F (36.5 °C)-99 °F (37.2 °C)] 98.7 °F (37.1 °C)  Pulse:  [64-90] 84  Resp:  [17-19] 19  SpO2:  [90 %-95 %] 90 %  BP: ()/(40-76) 126/60      Intake/Output Summary (Last 24 hours) at 2/1/2022 1755  Last data filed at 2/1/2022 1201  Gross per 24 hour   Intake 1260.02 ml   Output 3150 ml   Net -1889.98 ml        Body mass index is 45.3 kg/m².    Physical Exam  Vitals and nursing note reviewed.   Constitutional:       General: She is not in acute distress.     Appearance: Normal appearance. She is obese.   HENT:      Head: Normocephalic and atraumatic.   Eyes:      General: No scleral icterus.        Right eye: No discharge.         Left eye: No discharge.      Extraocular Movements: Extraocular movements intact.   Cardiovascular:      Rate and Rhythm: Normal rate.   Pulmonary:      Effort: Pulmonary effort is normal. No tachypnea or respiratory distress.   Neurological:      General: No focal deficit present.      Mental Status: She is alert and oriented to person, place, and time.      Cranial Nerves: No cranial nerve deficit.      Motor: No weakness.   Psychiatric:         Attention and Perception: Attention normal.         Mood and Affect: Mood and affect normal.         Speech: Speech normal.         Behavior: Behavior is cooperative.          Labs:  Recent Results (from the past 24 hour(s))   POCT glucose    Collection Time: 01/31/22  8:10 PM   Result Value Ref Range    POCT Glucose 179 (H) 70 - 110 mg/dL   Magnesium    Collection Time: 02/01/22  3:47 AM   Result Value Ref Range    Magnesium 1.5 (L) 1.6 - 2.6 mg/dL   Phosphorus    Collection Time: 02/01/22  3:47 AM   Result Value Ref Range    Phosphorus 5.2 (H) 2.7 - 4.5 mg/dL   CBC auto differential    Collection Time: 02/01/22  3:47 AM   Result Value Ref Range    WBC 7.25 3.90 - 12.70 K/uL    RBC 3.06 (L) 4.00 - 5.40 M/uL    Hemoglobin 8.9 (L) 12.0 - 16.0 g/dL     Hematocrit 28.1 (L) 37.0 - 48.5 %    MCV 92 82 - 98 fL    MCH 29.1 27.0 - 31.0 pg    MCHC 31.7 (L) 32.0 - 36.0 g/dL    RDW 15.1 (H) 11.5 - 14.5 %    Platelets 259 150 - 450 K/uL    MPV 9.5 9.2 - 12.9 fL    Immature Granulocytes 1.0 (H) 0.0 - 0.5 %    Gran # (ANC) 3.9 1.8 - 7.7 K/uL    Immature Grans (Abs) 0.07 (H) 0.00 - 0.04 K/uL    Lymph # 2.2 1.0 - 4.8 K/uL    Mono # 0.8 0.3 - 1.0 K/uL    Eos # 0.3 0.0 - 0.5 K/uL    Baso # 0.04 0.00 - 0.20 K/uL    nRBC 0 0 /100 WBC    Gran % 54.0 38.0 - 73.0 %    Lymph % 29.7 18.0 - 48.0 %    Mono % 10.6 4.0 - 15.0 %    Eosinophil % 4.1 0.0 - 8.0 %    Basophil % 0.6 0.0 - 1.9 %    Differential Method Automated    Basic metabolic panel    Collection Time: 02/01/22  3:47 AM   Result Value Ref Range    Sodium 130 (L) 136 - 145 mmol/L    Potassium 4.6 3.5 - 5.1 mmol/L    Chloride 95 95 - 110 mmol/L    CO2 23 23 - 29 mmol/L    Glucose 164 (H) 70 - 110 mg/dL    BUN 33 (H) 8 - 23 mg/dL    Creatinine 1.8 (H) 0.5 - 1.4 mg/dL    Calcium 8.5 (L) 8.7 - 10.5 mg/dL    Anion Gap 12 8 - 16 mmol/L    eGFR if African American 32.6 (A) >60 mL/min/1.73 m^2    eGFR if non  28.3 (A) >60 mL/min/1.73 m^2   POCT glucose    Collection Time: 02/01/22  7:18 AM   Result Value Ref Range    POCT Glucose 179 (H) 70 - 110 mg/dL   POCT glucose    Collection Time: 02/01/22 11:46 AM   Result Value Ref Range    POCT Glucose 159 (H) 70 - 110 mg/dL        Lab Results   Component Value Date    [id number] Negative 01/21/2022       Recent Labs   Lab 01/30/22 0450 01/30/22 0450 01/31/22 0450 02/01/22 0347   WBC 6.88  --  6.90 7.25   LYMPH 29.1  2.0   < > 31.0  2.1 29.7  2.2   HGB 8.7*  --  8.0* 8.9*   HCT 28.0*  --  24.8* 28.1*     --  240 259    < > = values in this interval not displayed.     Recent Labs   Lab 01/30/22 0450 01/31/22 0450 02/01/22 0347   * 128* 130*   K 3.3* 3.9 4.6   CL 96 94* 95   CO2 27 28 23   BUN 23 25* 33*   CREATININE 1.5* 1.7* 1.8*   * 152* 164*    CALCIUM 8.5* 7.9* 8.5*   MG 1.6 1.4* 1.5*   PHOS 4.6* 4.6* 5.2*     No results for input(s): ALKPHOS, ALT, AST, ALBUMIN, PROT, BILITOT, INR in the last 168 hours.     No results for input(s): DDIMER, FERRITIN, CRP, LDH, BNP, TROPONINI, CPK in the last 72 hours.    Invalid input(s): PROCALCITONIN    All labs within the last 24 hours were reviewed.     Microbiology:  Microbiology Results (last 7 days)     Procedure Component Value Units Date/Time    Urine culture [[id number]]  (Abnormal)  (Susceptibility) Collected: 01/21/22 1402    Order Status: Completed Specimen: Urine Updated: 01/26/22 1210     Urine Culture, Routine KLEBSIELLA PNEUMONIAE ESBL  > 100,000 cfu/ml      Narrative:      Specimen Source->Urine            Imaging  ECG Results          EKG 12-lead (Final result)  Result time 01/22/22 10:43:30    Final result by Interface, Lab In [facility] (01/22/22 10:43:30)                 Narrative:    Test Reason : R07.9,    Vent. Rate : 090 BPM     Atrial Rate : 090 BPM     P-R Int : 150 ms          QRS Dur : 084 ms      QT Int : 372 ms       P-R-T Axes : 003 007 098 degrees     QTc Int : 455 ms    Normal sinus rhythm  Nonspecific T wave abnormality  Abnormal ECG  When compared with ECG of 20-JAN-2022 19:58,  No significant change was found  Confirmed by Crow Bauer MD ([id number]) on 1/22/2022 10:43:13 AM    Referred By: [email or web address]   SELF           Confirmed By:Crow Bauer MD                              Results for orders placed during the hospital encounter of 11/28/21    Echo    Interpretation Summary  · The left ventricle is normal in size with normal systolic function. The estimated ejection fraction is 60%.  · Normal right ventricular size with normal right ventricular systolic function.  · Normal left ventricular diastolic function.  · Mechanically ventilated; cannot use inferior caval vein diameter to estimate central venous pressure.      X-Ray Chest AP Portable  Narrative: EXAMINATION:  XR CHEST AP  PORTABLE    CLINICAL HISTORY:  chest discomfort;    TECHNIQUE:  Single frontal view of the chest was performed.    COMPARISON:  01/20/2022    FINDINGS:  Again noted is right diaphragmatic elevation with a meniscus sign, likely indicating at least a small volume right pleural effusion.  The left lung field and pleural space remains clear.  The heart size appears normal.  There is mild calcification of the aortic knob consistent with atherosclerotic stigmata.  Tunneled right hemodialysis catheter noted.  Impression: No interval detrimental change identified.    Electronically signed by: Rosaline Farah  Date:    01/21/2022  Time:    13:34      All imaging within the last 24 hours was reviewed.       Discharge Planning   FLORENTINO: 2/1/2022     Code Status: Full Code   Is the patient medically ready for discharge?: Yes    Reason for patient still in hospital (select all that apply): Patient trending condition, Treatment and Pending disposition  Discharge Plan A: Skilled Nursing Facility   Discharge Delays: (!) Post-Acute Set-up

## 2022-02-01 NOTE — ASSESSMENT & PLAN NOTE
See CKD4  -patient has had trouble getting to dialysis from home and is unable to pay co-pay for skilled nursing; she is having a ramp built at her home and transportation will need to be arranged prior to discharge

## 2022-02-01 NOTE — PLAN OF CARE
02/01/22 0812   Discharge Reassessment   Assessment Type Discharge Planning Reassessment   Did the patient's condition or plan change since previous assessment? No   Discharge Plan discussed with: Patient   Communicated FLORENTINO with patient/caregiver Date not available/Unable to determine   Discharge Plan A Skilled Nursing Facility   Discharge Plan B Home Health   DME Needed Upon Discharge  other (see comments)  (TBD)   Why the patient remains in the hospital Placement issues   Post-Acute Status   Discharge Delays (!) Post-Acute Set-up     Having difficulty placing patient due to dialysis and the possibility that she is out of SNF days. Extended the area of potential SNFs adding 25 to the list of referrals sent via [facility]. There are now 50 referrals out. Will continue to follow.     Left voicemail for Miranda Gabriel @ [phone] ext [phone] at [facility] to determine if patient has SNF days still available.       Alena Wright, [facility]  [phone]

## 2022-02-01 NOTE — PROGRESS NOTES
Dialysis complete.  Blood returned.  RIJ PC flushed, heparin locked, capped and taped.      Net UF 1.7L.  Unable to remove more due to soft BP's.      Transported from [facility] to room 608 via bed by transporters.

## 2022-02-01 NOTE — PLAN OF CARE
Spoke with Miranda Gabriel @ [phone] ext [phone] at [facility]. She reported that the patient still has SNF days available. Will continue to follow.    Reached out to [facility] to ask if they could help to determine what the patient's copays would be if DC to SNF.     Received call from [place] at [facility] ([phone]). She reported that they cannot accept patient because she is receiving HD at [facility] and they cannot transport this far. I asked if she knew what the copay for the patient would be. She reported that once the patient is out of days with [facility], Medicaid pays for SNF days.       Alena Wright, [facility]  [phone]

## 2022-02-01 NOTE — ASSESSMENT & PLAN NOTE
-dialysis dependent ZAK secondary to septic shock 1 month prior  -MIKKI, TOSHIA TDC, , still makes lots of urine, [facility] dialysis unit Dr. Hurst  -murillo catheter was declogged in ED with lots of urine output  -significant LE edema and hyponatremia from water intake  -seen on HD today with UF of 1.7L limited by BP  -please continue furosemide 160 bid

## 2022-02-01 NOTE — SUBJECTIVE & OBJECTIVE
This encounter was provided through telemedicine.  Patient was transferred to the telemedicine service on:  01/31/2022   The patient location is: 608/608 A admitted 1/21/2022 11:36 AM.  Present with the patient at the time of the telemed/virtual assessment: Telepresenter    Interval History/Overnight Events:     MOELLER last night relieved with sumatriptan; otherwise feeling well; dialysis planned for tomorrow; awaiting coordination of home services for discharge      Review of Systems   Constitutional: Negative for fever.   Respiratory: Negative for cough and shortness of breath.    Cardiovascular: Negative for chest pain.   Neurological: Positive for weakness (Generalized).        Inpatient Medications:  Scheduled Meds:   sodium chloride 0.9%   Intravenous Once    sodium chloride 0.9%   Intravenous Once    sodium chloride 0.9%   Intravenous Once    anastrozole  1 mg Oral Daily    apixaban  2.5 mg Oral BID    atorvastatin  80 mg Oral Daily    cloNIDine  0.2 mg Oral TID    epoetin chloe-ebpx (RETACRIT) injection  50 Units/kg Intravenous Every Tues, Thurs, Sat    ergocalciferol  50,000 Units Oral Q7 Days    famotidine  20 mg Oral Daily    furosemide  160 mg Oral BID    gemfibroziL  600 mg Oral Every Mon, Wed, Fri    levothyroxine  50 mcg Oral Before breakfast    lisinopriL  10 mg Oral Daily    metoprolol tartrate  25 mg Oral BID    mupirocin   Nasal BID    oxybutynin  10 mg Oral Daily    polyethylene glycol  17 g Oral BID    senna-docusate 8.6-50 mg  1 tablet Oral BID    venlafaxine  150 mg Oral Daily     Continuous Infusions:  PRN Meds:.acetaminophen, butalbital-acetaminophen-caffeine -40 mg, dextrose 50%, dextrose 50%, glucagon (human recombinant), glucose, glucose, heparin (porcine), heparin (porcine), heparin (porcine), influenza, insulin aspart U-100, melatonin, methocarbamoL, ondansetron, oxyCODONE, sodium chloride 0.9%, sodium chloride 0.9%, sodium chloride 0.9%, sodium chloride 0.9%,  sumatriptan      Objective:     Temp:  [98 °F (36.7 °C)-99 °F (37.2 °C)] 99 °F (37.2 °C)  Pulse:  [69-74] 72  Resp:  [16-19] 19  SpO2:  [94 %-97 %] 94 %  BP: (119-142)/(55-64) 119/55      Intake/Output Summary (Last 24 hours) at 1/31/2022 2103  Last data filed at 1/31/2022 1800  Gross per 24 hour   Intake 1100 ml   Output 1800 ml   Net -700 ml        Body mass index is 45.3 kg/m².    Physical Exam  Vitals and nursing note reviewed.   Constitutional:       General: She is not in acute distress.     Appearance: Normal appearance. She is obese.   HENT:      Head: Normocephalic and atraumatic.   Eyes:      General: No scleral icterus.        Right eye: No discharge.         Left eye: No discharge.      Extraocular Movements: Extraocular movements intact.   Cardiovascular:      Rate and Rhythm: Normal rate.   Pulmonary:      Effort: Pulmonary effort is normal. No tachypnea or respiratory distress.   Neurological:      General: No focal deficit present.      Mental Status: She is alert and oriented to person, place, and time.      Cranial Nerves: No cranial nerve deficit.      Motor: No weakness.   Psychiatric:         Attention and Perception: Attention normal.         Mood and Affect: Mood and affect normal.         Speech: Speech normal.         Behavior: Behavior is cooperative.          Labs:  Recent Results (from the past 24 hour(s))   POCT glucose    Collection Time: 01/30/22  9:08 PM   Result Value Ref Range    POCT Glucose 177 (H) 70 - 110 mg/dL   Magnesium    Collection Time: 01/31/22  4:50 AM   Result Value Ref Range    Magnesium 1.4 (L) 1.6 - 2.6 mg/dL   Phosphorus    Collection Time: 01/31/22  4:50 AM   Result Value Ref Range    Phosphorus 4.6 (H) 2.7 - 4.5 mg/dL   CBC auto differential    Collection Time: 01/31/22  4:50 AM   Result Value Ref Range    WBC 6.90 3.90 - 12.70 K/uL    RBC 2.70 (L) 4.00 - 5.40 M/uL    Hemoglobin 8.0 (L) 12.0 - 16.0 g/dL    Hematocrit 24.8 (L) 37.0 - 48.5 %    MCV 92 82 - 98 fL     MCH 29.6 27.0 - 31.0 pg    MCHC 32.3 32.0 - 36.0 g/dL    RDW 15.4 (H) 11.5 - 14.5 %    Platelets 240 150 - 450 K/uL    MPV 9.2 9.2 - 12.9 fL    Immature Granulocytes 1.0 (H) 0.0 - 0.5 %    Gran # (ANC) 3.6 1.8 - 7.7 K/uL    Immature Grans (Abs) 0.07 (H) 0.00 - 0.04 K/uL    Lymph # 2.1 1.0 - 4.8 K/uL    Mono # 0.8 0.3 - 1.0 K/uL    Eos # 0.3 0.0 - 0.5 K/uL    Baso # 0.04 0.00 - 0.20 K/uL    nRBC 0 0 /100 WBC    Gran % 51.6 38.0 - 73.0 %    Lymph % 31.0 18.0 - 48.0 %    Mono % 11.3 4.0 - 15.0 %    Eosinophil % 4.5 0.0 - 8.0 %    Basophil % 0.6 0.0 - 1.9 %    Differential Method Automated    Basic metabolic panel    Collection Time: 01/31/22  4:50 AM   Result Value Ref Range    Sodium 128 (L) 136 - 145 mmol/L    Potassium 3.9 3.5 - 5.1 mmol/L    Chloride 94 (L) 95 - 110 mmol/L    CO2 28 23 - 29 mmol/L    Glucose 152 (H) 70 - 110 mg/dL    BUN 25 (H) 8 - 23 mg/dL    Creatinine 1.7 (H) 0.5 - 1.4 mg/dL    Calcium 7.9 (L) 8.7 - 10.5 mg/dL    Anion Gap 6 (L) 8 - 16 mmol/L    eGFR if African American 35.0 (A) >60 mL/min/1.73 m^2    eGFR if non  30.3 (A) >60 mL/min/1.73 m^2   POCT glucose    Collection Time: 01/31/22  7:36 AM   Result Value Ref Range    POCT Glucose 152 (H) 70 - 110 mg/dL   POCT glucose    Collection Time: 01/31/22 12:10 PM   Result Value Ref Range    POCT Glucose 169 (H) 70 - 110 mg/dL   POCT glucose    Collection Time: 01/31/22  4:06 PM   Result Value Ref Range    POCT Glucose 225 (H) 70 - 110 mg/dL   POCT glucose    Collection Time: 01/31/22  8:10 PM   Result Value Ref Range    POCT Glucose 179 (H) 70 - 110 mg/dL        Lab Results   Component Value Date    [id number] Negative 01/21/2022       Recent Labs   Lab 01/29/22  0416 01/29/22  0416 01/30/22  0450 01/31/22  0450   WBC 7.58  --  6.88 6.90   LYMPH 21.1  1.6   < > 29.1  2.0 31.0  2.1   HGB 8.4*  --  8.7* 8.0*   HCT 26.6*  --  28.0* 24.8*     --  249 240    < > = values in this interval not displayed.     Recent Labs   Lab  01/29/22  0416 01/30/22  0450 01/31/22  0450   * 132* 128*   K 3.6 3.3* 3.9   CL 95 96 94*   CO2 25 27 28   BUN 15 23 25*   CREATININE 1.5* 1.5* 1.7*   * 135* 152*   CALCIUM 7.9* 8.5* 7.9*   MG 1.7 1.6 1.4*   PHOS 3.8 4.6* 4.6*     No results for input(s): ALKPHOS, ALT, AST, ALBUMIN, PROT, BILITOT, INR in the last 168 hours.     No results for input(s): DDIMER, FERRITIN, CRP, LDH, BNP, TROPONINI, CPK in the last 72 hours.    Invalid input(s): PROCALCITONIN    All labs within the last 24 hours were reviewed.     Microbiology:  Microbiology Results (last 7 days)     Procedure Component Value Units Date/Time    Urine culture [[id number]]  (Abnormal)  (Susceptibility) Collected: 01/21/22 1402    Order Status: Completed Specimen: Urine Updated: 01/26/22 1210     Urine Culture, Routine KLEBSIELLA PNEUMONIAE ESBL  > 100,000 cfu/ml      Narrative:      Specimen Source->Urine            Imaging  ECG Results          EKG 12-lead (Final result)  Result time 01/22/22 10:43:30    Final result by Interface, Lab In [facility] (01/22/22 10:43:30)                 Narrative:    Test Reason : R07.9,    Vent. Rate : 090 BPM     Atrial Rate : 090 BPM     P-R Int : 150 ms          QRS Dur : 084 ms      QT Int : 372 ms       P-R-T Axes : 003 007 098 degrees     QTc Int : 455 ms    Normal sinus rhythm  Nonspecific T wave abnormality  Abnormal ECG  When compared with ECG of 20-JAN-2022 19:58,  No significant change was found  Confirmed by Crow Bauer MD ([id number]) on 1/22/2022 10:43:13 AM    Referred By: [email or web address]   SELF           Confirmed By:Crow Bauer MD                              Results for orders placed during the hospital encounter of 11/28/21    Echo    Interpretation Summary  · The left ventricle is normal in size with normal systolic function. The estimated ejection fraction is 60%.  · Normal right ventricular size with normal right ventricular systolic function.  · Normal left ventricular diastolic function.  ·  Mechanically ventilated; cannot use inferior caval vein diameter to estimate central venous pressure.      X-Ray Chest AP Portable  Narrative: EXAMINATION:  XR CHEST AP PORTABLE    CLINICAL HISTORY:  chest discomfort;    TECHNIQUE:  Single frontal view of the chest was performed.    COMPARISON:  01/20/2022    FINDINGS:  Again noted is right diaphragmatic elevation with a meniscus sign, likely indicating at least a small volume right pleural effusion.  The left lung field and pleural space remains clear.  The heart size appears normal.  There is mild calcification of the aortic knob consistent with atherosclerotic stigmata.  Tunneled right hemodialysis catheter noted.  Impression: No interval detrimental change identified.    Electronically signed by: Rosaline Farah  Date:    01/21/2022  Time:    13:34      All imaging within the last 24 hours was reviewed.       Discharge Planning   FLORENTINO: 2/1/2022     Code Status: Full Code   Is the patient medically ready for discharge?: Yes    Reason for patient still in hospital (select all that apply): Patient trending condition, Treatment and Pending disposition  Discharge Plan A: Skilled Nursing Facility

## 2022-02-01 NOTE — PLAN OF CARE
Problem: Skin Injury Risk Increased  Goal: Skin Health and Integrity  Outcome: Ongoing, Progressing     Problem: Adult Inpatient Plan of Care  Goal: Plan of Care Review  Outcome: Ongoing, Progressing     Problem: Adult Inpatient Plan of Care  Goal: Patient-Specific Goal (Individualized)  Outcome: Ongoing, Progressing     Problem: Diabetes Comorbidity  Goal: Blood Glucose Level Within Targeted Range  Outcome: Ongoing, Progressing     Problem: Hemodynamic Instability (Hemodialysis)  Goal: Effective Tissue Perfusion  Outcome: Ongoing, Progressing

## 2022-02-01 NOTE — PT/OT/SLP PROGRESS
Occupational Therapy      Patient Name:  Cecile Bowen   MRN:  [id number]    Patient not seen today secondary to off the floor Dialysis. Patient still within OT POC. Will follow-up as able.  SUZI Barragan  2/1/2022

## 2022-02-01 NOTE — PT/OT/SLP PROGRESS
Physical Therapy      Patient Name:  Cecile Bowen   MRN:  [id number]    Patient not seen today secondary to Dialysis. Will follow-up at next scheduled visit per PT POC.

## 2022-02-01 NOTE — PROGRESS NOTES
Acute dialysis started to [facility].  Tolerated well.    Contact isolation precautions maintained.

## 2022-02-01 NOTE — PROGRESS NOTES
[facility] - [facility]  Nephrology  Progress Note    Patient Name: Cecile Bowen  MRN: [id number]  Admission Date: 1/21/2022  Hospital Length of Stay: 7 days  Attending Provider: Dorothy Kraus MD   Primary Care Physician: Kailey Navarro MD  Principal Problem:Dialysis patient    Subjective:     HPI: 71 year old female with dialysis dependent ZKA here because she felt bad and could get to her dialysis unit. She says she couldn't get to dialysis because she couldn't arrange transportation. Last HD 1/17/22.  Nephrology consulted for ESKD.      Interval History: HD this morning with UF limited by BP    Review of patient's allergies indicates:  No Known Allergies  Current Facility-Administered Medications   Medication Frequency    0.9%  NaCl infusion Once    0.9%  NaCl infusion Once    0.9%  NaCl infusion Once    acetaminophen tablet 650 mg Q4H PRN    anastrozole tablet 1 mg Daily    apixaban tablet 2.5 mg BID    atorvastatin tablet 80 mg Daily    butalbital-acetaminophen-caffeine -40 mg per tablet 1 tablet Daily PRN    cloNIDine tablet 0.2 mg TID    dextrose 50% injection 12.5 g PRN    dextrose 50% injection 25 g PRN    epoetin chloe-epbx injection 6,000 Units Every Tues, Thurs, Sat    ergocalciferol capsule 50,000 Units Q7 Days    famotidine tablet 20 mg Daily    furosemide tablet 160 mg BID    gemfibroziL tablet 600 mg Every Mon, Wed, Fri    glucagon (human recombinant) injection 1 mg PRN    glucose chewable tablet 16 g PRN    glucose chewable tablet 24 g PRN    heparin (porcine) injection 1,000 Units PRN    heparin (porcine) injection 1,000 Units PRN    heparin (porcine) injection 1,000 Units PRN    heparin (porcine) injection 1,000 Units PRN    influenza (QUADRIVALENT ADJUVANTED PF) vaccine 0.5 mL vaccine x 1 dose    insulin aspart U-100 pen 0-5 Units QID (AC + HS) PRN    levothyroxine tablet 50 mcg Before breakfast    lisinopriL tablet 10 mg Daily    melatonin tablet 6 mg  Nightly PRN    methocarbamoL tablet 750 mg TID PRN    metoprolol tartrate (LOPRESSOR) tablet 25 mg BID    mupirocin 2 % ointment BID    ondansetron injection 4 mg Q8H PRN    oxybutynin 24 hr tablet 10 mg Daily    oxyCODONE immediate release tablet Tab 10 mg Q4H PRN    polyethylene glycol packet 17 g BID    senna-docusate 8.6-50 mg per tablet 1 tablet BID    sodium chloride 0.9% bolus 250 mL PRN    sodium chloride 0.9% bolus 250 mL PRN    sodium chloride 0.9% bolus 250 mL PRN    sodium chloride 0.9% bolus 250 mL PRN    sodium chloride 0.9% flush 10 mL PRN    sumatriptan tablet 100 mg BID PRN    venlafaxine 24 hr capsule 150 mg Daily       Objective:     Vital Signs (Most Recent):  Temp: 97.7 °F (36.5 °C) (02/01/22 1159)  Pulse: 83 (02/01/22 1217)  Resp: 18 (02/01/22 1355)  BP: (!) 145/76 (02/01/22 1217)  SpO2: 95 % (02/01/22 1217)  O2 Device (Oxygen Therapy): room air (02/01/22 1159) Vital Signs (24h Range):  Temp:  [97.7 °F (36.5 °C)-99 °F (37.2 °C)] 97.7 °F (36.5 °C)  Pulse:  [64-90] 83  Resp:  [16-19] 18  SpO2:  [94 %-95 %] 95 %  BP: ()/(40-76) 145/76     Weight: 116 kg (255 lb 11.7 oz) (01/31/22 1723)  Body mass index is 45.3 kg/m².  Body surface area is 2.27 meters squared.    I/O last 3 completed shifts:  In: 1300 [P.O.:1300]  Out: 1800 [Urine:1800]    Physical Exam  Constitutional:       General: She is not in acute distress.     Appearance: She is obese.   HENT:      Mouth/Throat:      Mouth: Mucous membranes are moist.   Eyes:      General: No scleral icterus.     Extraocular Movements: Extraocular movements intact.      Pupils: Pupils are equal, round, and reactive to light.   Cardiovascular:      Rate and Rhythm: Normal rate and regular rhythm.   Pulmonary:      Effort: Pulmonary effort is normal. No respiratory distress.   Abdominal:      General: There is no distension.      Palpations: Abdomen is soft.   Musculoskeletal:         General: Deformity (RUE TDC) present.      Right lower  leg: Edema present.      Left lower leg: Edema present.   Skin:     Coloration: Skin is not jaundiced.   Neurological:      General: No focal deficit present.      Mental Status: She is alert.         Significant Labs:  All labs within the past 24 hours have been reviewed.     Significant Imaging:  Labs: Reviewed    Assessment/Plan:     ZAK (acute kidney injury)  -dialysis dependent ZAK secondary to septic shock 1 month prior  -MWF, TOSHIA TDC, , still makes lots of urine, [facility] dialysis unit Dr. Hurst  -murillo catheter was declogged in ED with lots of urine output  -significant LE edema and hyponatremia from water intake  -seen on HD today with UF of 1.7L limited by BP  -please continue furosemide 160 bid    Anemia of chronic disease  -epo TIW  -IV iron outpatient    Chronic kidney disease-mineral and bone disorder  -continue vit d    Hyponatremia  -she is making dilute urine at 200 osm  -her hyponatremia is from excess water intake          Emmanuel Hare MD  Nephrology  [facility]

## 2022-02-01 NOTE — SUBJECTIVE & OBJECTIVE
Interval History: HD this morning with UF limited by BP    Review of patient's allergies indicates:  No Known Allergies  Current Facility-Administered Medications   Medication Frequency    0.9%  NaCl infusion Once    0.9%  NaCl infusion Once    0.9%  NaCl infusion Once    acetaminophen tablet 650 mg Q4H PRN    anastrozole tablet 1 mg Daily    apixaban tablet 2.5 mg BID    atorvastatin tablet 80 mg Daily    butalbital-acetaminophen-caffeine -40 mg per tablet 1 tablet Daily PRN    cloNIDine tablet 0.2 mg TID    dextrose 50% injection 12.5 g PRN    dextrose 50% injection 25 g PRN    epoetin chloe-epbx injection 6,000 Units Every Tues, Thurs, Sat    ergocalciferol capsule 50,000 Units Q7 Days    famotidine tablet 20 mg Daily    furosemide tablet 160 mg BID    gemfibroziL tablet 600 mg Every Mon, Wed, Fri    glucagon (human recombinant) injection 1 mg PRN    glucose chewable tablet 16 g PRN    glucose chewable tablet 24 g PRN    heparin (porcine) injection 1,000 Units PRN    heparin (porcine) injection 1,000 Units PRN    heparin (porcine) injection 1,000 Units PRN    heparin (porcine) injection 1,000 Units PRN    influenza (QUADRIVALENT ADJUVANTED PF) vaccine 0.5 mL vaccine x 1 dose    insulin aspart U-100 pen 0-5 Units QID (AC + HS) PRN    levothyroxine tablet 50 mcg Before breakfast    lisinopriL tablet 10 mg Daily    melatonin tablet 6 mg Nightly PRN    methocarbamoL tablet 750 mg TID PRN    metoprolol tartrate (LOPRESSOR) tablet 25 mg BID    mupirocin 2 % ointment BID    ondansetron injection 4 mg Q8H PRN    oxybutynin 24 hr tablet 10 mg Daily    oxyCODONE immediate release tablet Tab 10 mg Q4H PRN    polyethylene glycol packet 17 g BID    senna-docusate 8.6-50 mg per tablet 1 tablet BID    sodium chloride 0.9% bolus 250 mL PRN    sodium chloride 0.9% bolus 250 mL PRN    sodium chloride 0.9% bolus 250 mL PRN    sodium chloride 0.9% bolus 250 mL PRN    sodium chloride 0.9%  flush 10 mL PRN    sumatriptan tablet 100 mg BID PRN    venlafaxine 24 hr capsule 150 mg Daily       Objective:     Vital Signs (Most Recent):  Temp: 97.7 °F (36.5 °C) (02/01/22 1159)  Pulse: 83 (02/01/22 1217)  Resp: 18 (02/01/22 1355)  BP: (!) 145/76 (02/01/22 1217)  SpO2: 95 % (02/01/22 1217)  O2 Device (Oxygen Therapy): room air (02/01/22 1159) Vital Signs (24h Range):  Temp:  [97.7 °F (36.5 °C)-99 °F (37.2 °C)] 97.7 °F (36.5 °C)  Pulse:  [64-90] 83  Resp:  [16-19] 18  SpO2:  [94 %-95 %] 95 %  BP: ()/(40-76) 145/76     Weight: 116 kg (255 lb 11.7 oz) (01/31/22 1723)  Body mass index is 45.3 kg/m².  Body surface area is 2.27 meters squared.    I/O last 3 completed shifts:  In: 1300 [P.O.:1300]  Out: 1800 [Urine:1800]    Physical Exam  Constitutional:       General: She is not in acute distress.     Appearance: She is obese.   HENT:      Mouth/Throat:      Mouth: Mucous membranes are moist.   Eyes:      General: No scleral icterus.     Extraocular Movements: Extraocular movements intact.      Pupils: Pupils are equal, round, and reactive to light.   Cardiovascular:      Rate and Rhythm: Normal rate and regular rhythm.   Pulmonary:      Effort: Pulmonary effort is normal. No respiratory distress.   Abdominal:      General: There is no distension.      Palpations: Abdomen is soft.   Musculoskeletal:         General: Deformity (RUE TDC) present.      Right lower leg: Edema present.      Left lower leg: Edema present.   Skin:     Coloration: Skin is not jaundiced.   Neurological:      General: No focal deficit present.      Mental Status: She is alert.         Significant Labs:  All labs within the past 24 hours have been reviewed.     Significant Imaging:  Labs: Reviewed

## 2022-02-02 ENCOUNTER — EXTERNAL HOME HEALTH (OUTPATIENT)
Dept: HOME HEALTH SERVICES | Facility: HOSPITAL | Age: 72
End: 2022-02-02
Payer: MEDICARE

## 2022-02-02 PROBLEM — Z75.8 DISCHARGE PLANNING ISSUES: Status: ACTIVE | Noted: 2022-02-02

## 2022-02-02 PROBLEM — Z02.9 DISCHARGE PLANNING ISSUES: Status: ACTIVE | Noted: 2022-02-02

## 2022-02-02 LAB
ANION GAP SERPL CALC-SCNC: 7 MMOL/L (ref 8–16)
BASOPHILS # BLD AUTO: 0.05 K/UL (ref 0–0.2)
BASOPHILS NFR BLD: 0.8 % (ref 0–1.9)
BUN SERPL-MCNC: 25 MG/DL (ref 8–23)
CALCIUM SERPL-MCNC: 8.1 MG/DL (ref 8.7–10.5)
CHLORIDE SERPL-SCNC: 97 MMOL/L (ref 95–110)
CO2 SERPL-SCNC: 29 MMOL/L (ref 23–29)
CREAT SERPL-MCNC: 1.6 MG/DL (ref 0.5–1.4)
DIFFERENTIAL METHOD: ABNORMAL
EOSINOPHIL # BLD AUTO: 0.3 K/UL (ref 0–0.5)
EOSINOPHIL NFR BLD: 4.7 % (ref 0–8)
ERYTHROCYTE [DISTWIDTH] IN BLOOD BY AUTOMATED COUNT: 15.4 % (ref 11.5–14.5)
EST. GFR  (AFRICAN AMERICAN): 37.6 ML/MIN/1.73 M^2
EST. GFR  (NON AFRICAN AMERICAN): 32.6 ML/MIN/1.73 M^2
GLUCOSE SERPL-MCNC: 160 MG/DL (ref 70–110)
HCT VFR BLD AUTO: 25.4 % (ref 37–48.5)
HGB BLD-MCNC: 8 G/DL (ref 12–16)
IMM GRANULOCYTES # BLD AUTO: 0.08 K/UL (ref 0–0.04)
IMM GRANULOCYTES NFR BLD AUTO: 1.2 % (ref 0–0.5)
LYMPHOCYTES # BLD AUTO: 2.1 K/UL (ref 1–4.8)
LYMPHOCYTES NFR BLD: 32.8 % (ref 18–48)
MAGNESIUM SERPL-MCNC: 1.5 MG/DL (ref 1.6–2.6)
MCH RBC QN AUTO: 29.1 PG (ref 27–31)
MCHC RBC AUTO-ENTMCNC: 31.5 G/DL (ref 32–36)
MCV RBC AUTO: 92 FL (ref 82–98)
MONOCYTES # BLD AUTO: 0.7 K/UL (ref 0.3–1)
MONOCYTES NFR BLD: 10.9 % (ref 4–15)
NEUTROPHILS # BLD AUTO: 3.2 K/UL (ref 1.8–7.7)
NEUTROPHILS NFR BLD: 49.6 % (ref 38–73)
NRBC BLD-RTO: 0 /100 WBC
PHOSPHATE SERPL-MCNC: 3.6 MG/DL (ref 2.7–4.5)
PLATELET # BLD AUTO: 248 K/UL (ref 150–450)
PMV BLD AUTO: 9.7 FL (ref 9.2–12.9)
POCT GLUCOSE: 182 MG/DL (ref 70–110)
POCT GLUCOSE: 184 MG/DL (ref 70–110)
POCT GLUCOSE: 214 MG/DL (ref 70–110)
POCT GLUCOSE: 245 MG/DL (ref 70–110)
POTASSIUM SERPL-SCNC: 3.5 MMOL/L (ref 3.5–5.1)
RBC # BLD AUTO: 2.75 M/UL (ref 4–5.4)
SODIUM SERPL-SCNC: 133 MMOL/L (ref 136–145)
WBC # BLD AUTO: 6.41 K/UL (ref 3.9–12.7)

## 2022-02-02 PROCEDURE — 83735 ASSAY OF MAGNESIUM: CPT | Mod: HCNC

## 2022-02-02 PROCEDURE — 97530 THERAPEUTIC ACTIVITIES: CPT | Mod: HCNC,CQ

## 2022-02-02 PROCEDURE — 11000001 HC ACUTE MED/SURG PRIVATE ROOM: Mod: HCNC

## 2022-02-02 PROCEDURE — 80048 BASIC METABOLIC PNL TOTAL CA: CPT | Mod: HCNC

## 2022-02-02 PROCEDURE — 25000003 PHARM REV CODE 250: Mod: HCNC | Performed by: STUDENT IN AN ORGANIZED HEALTH CARE EDUCATION/TRAINING PROGRAM

## 2022-02-02 PROCEDURE — 27000207 HC ISOLATION: Mod: HCNC

## 2022-02-02 PROCEDURE — 97535 SELF CARE MNGMENT TRAINING: CPT | Mod: HCNC,CO

## 2022-02-02 PROCEDURE — 25000003 PHARM REV CODE 250: Mod: HCNC | Performed by: NURSE PRACTITIONER

## 2022-02-02 PROCEDURE — 25000003 PHARM REV CODE 250: Mod: HCNC

## 2022-02-02 PROCEDURE — 85025 COMPLETE CBC W/AUTO DIFF WBC: CPT | Mod: HCNC

## 2022-02-02 PROCEDURE — 84100 ASSAY OF PHOSPHORUS: CPT | Mod: HCNC

## 2022-02-02 PROCEDURE — 97110 THERAPEUTIC EXERCISES: CPT | Mod: HCNC,CO

## 2022-02-02 PROCEDURE — 97110 THERAPEUTIC EXERCISES: CPT | Mod: HCNC,CQ

## 2022-02-02 PROCEDURE — 36415 COLL VENOUS BLD VENIPUNCTURE: CPT | Mod: HCNC

## 2022-02-02 RX ADMIN — INSULIN ASPART 2 UNITS: 100 INJECTION, SOLUTION INTRAVENOUS; SUBCUTANEOUS at 12:02

## 2022-02-02 RX ADMIN — VENLAFAXINE HYDROCHLORIDE 150 MG: 75 CAPSULE, EXTENDED RELEASE ORAL at 09:02

## 2022-02-02 RX ADMIN — LEVOTHYROXINE SODIUM 50 MCG: 50 TABLET ORAL at 06:02

## 2022-02-02 RX ADMIN — CLONIDINE HYDROCHLORIDE 0.2 MG: 0.2 TABLET ORAL at 09:02

## 2022-02-02 RX ADMIN — FUROSEMIDE 160 MG: 80 TABLET ORAL at 08:02

## 2022-02-02 RX ADMIN — GEMFIBROZIL 600 MG: 600 TABLET ORAL at 05:02

## 2022-02-02 RX ADMIN — ATORVASTATIN CALCIUM 80 MG: 20 TABLET, FILM COATED ORAL at 09:02

## 2022-02-02 RX ADMIN — OXYBUTYNIN CHLORIDE 10 MG: 10 TABLET, FILM COATED, EXTENDED RELEASE ORAL at 09:02

## 2022-02-02 RX ADMIN — METOPROLOL TARTRATE 25 MG: 25 TABLET, FILM COATED ORAL at 09:02

## 2022-02-02 RX ADMIN — OXYCODONE HYDROCHLORIDE 10 MG: 10 TABLET ORAL at 03:02

## 2022-02-02 RX ADMIN — INSULIN ASPART 1 UNITS: 100 INJECTION, SOLUTION INTRAVENOUS; SUBCUTANEOUS at 10:02

## 2022-02-02 RX ADMIN — APIXABAN 2.5 MG: 2.5 TABLET, FILM COATED ORAL at 09:02

## 2022-02-02 RX ADMIN — SENNOSIDES AND DOCUSATE SODIUM 1 TABLET: 50; 8.6 TABLET ORAL at 08:02

## 2022-02-02 RX ADMIN — Medication 6 MG: at 08:02

## 2022-02-02 RX ADMIN — CLONIDINE HYDROCHLORIDE 0.2 MG: 0.2 TABLET ORAL at 08:02

## 2022-02-02 RX ADMIN — APIXABAN 2.5 MG: 2.5 TABLET, FILM COATED ORAL at 08:02

## 2022-02-02 RX ADMIN — LISINOPRIL 10 MG: 10 TABLET ORAL at 09:02

## 2022-02-02 RX ADMIN — ANASTROZOLE 1 MG: 1 TABLET, COATED ORAL at 09:02

## 2022-02-02 RX ADMIN — FUROSEMIDE 160 MG: 80 TABLET ORAL at 09:02

## 2022-02-02 RX ADMIN — SENNOSIDES AND DOCUSATE SODIUM 1 TABLET: 50; 8.6 TABLET ORAL at 09:02

## 2022-02-02 RX ADMIN — OXYCODONE HYDROCHLORIDE 10 MG: 10 TABLET ORAL at 08:02

## 2022-02-02 RX ADMIN — OXYCODONE HYDROCHLORIDE 10 MG: 10 TABLET ORAL at 06:02

## 2022-02-02 RX ADMIN — FAMOTIDINE 20 MG: 20 TABLET ORAL at 09:02

## 2022-02-02 RX ADMIN — METOPROLOL TARTRATE 25 MG: 25 TABLET, FILM COATED ORAL at 08:02

## 2022-02-02 RX ADMIN — CLONIDINE HYDROCHLORIDE 0.2 MG: 0.2 TABLET ORAL at 03:02

## 2022-02-02 NOTE — PLAN OF CARE
Reviewed [facility] for referral responses. 50 referrals have been sent. 23 responses (all denials) have been received. Will continue to follow.     Alena Wright, [facility]  [phone]

## 2022-02-02 NOTE — PT/OT/SLP PROGRESS
Physical Therapy Treatment    Patient Name:  Cecile Bowen   MRN:  [id number]    Recommendations:     Discharge Recommendations:  nursing facility, skilled   Discharge Equipment Recommendations: none   Barriers to discharge: Decreased caregiver support    Assessment:     Cecile Bowen is a 71 y.o. female admitted with a medical diagnosis of Dialysis patient.  She presents with the following impairments/functional limitations:  weakness,impaired endurance,impaired self care skills,impaired functional mobilty,decreased ROM,edema,decreased lower extremity function,decreased upper extremity function. Pt continues to require significant assistance with all mobility.Pt will continue to benefit from skilled PT services to improve all deficits noted above. Resume PT POC as indicated.    Rehab Prognosis: Good; patient would benefit from acute skilled PT services to address these deficits and reach maximum level of function.    Recent Surgery: * No surgery found *      Plan:     During this hospitalization, patient to be seen 3 x/week to address the identified rehab impairments via therapeutic activities,therapeutic exercises and progress toward the following goals:    · Plan of Care Expires:  02/22/22    Subjective     Chief Complaint: none stated  Patient/Family Comments/goals: none stated  Pain/Comfort:  · Pain Rating 1: 0/10  · Pain Rating Post-Intervention 1: 0/10      Objective:     Communicated with nsg prior to session.  Patient found supine with  (all lines intact) upon PT entry to room.     General Precautions: Standard, contact,fall   Orthopedic Precautions:N/A   Braces: N/A  Respiratory Status: Room air     Functional Mobility:  · Bed Mobility:  · Supine to Sit: minimum assistance  · Sit to Supine: moderate assistance  · Balance: Pt sat EOB ~20min with SPV      AM-PAC 6 CLICK MOBILITY  Turning over in bed (including adjusting bedclothes, sheets and blankets)?: 3  Sitting down on and standing up from a chair with  arms (e.g., wheelchair, bedside commode, etc.): 2  Moving from lying on back to sitting on the side of the bed?: 3  Moving to and from a bed to a chair (including a wheelchair)?: 1  Need to walk in hospital room?: 1  Climbing 3-5 steps with a railing?: 1  Basic Mobility Total Score: 11       Therapeutic Activities and Exercises:   -BLE therex 2x10 reps: AP,LAQ,HF,hip abd/add,GS    Patient left supine with all lines intact, call button in reach and nsg notified..    GOALS:   Multidisciplinary Problems     Physical Therapy Goals        Problem: Physical Therapy Goal    Goal Priority Disciplines Outcome Goal Variances Interventions   Physical Therapy Goal     PT, PT/OT Ongoing, Progressing     Description: Goals to be met by: 22    Patient will increase functional independence with mobility by performin. Supine to sit with MInimal Assistance  2. Sit to supine with MInimal Assistance  3. Sit to stand transfer with Maximum Assistance  4.Patient will demonstrate independence with a home exercise program  5. Bed to chair transfer with Contact Guard Assistance using Slideboard                   Time Tracking:     PT Received On: 22  PT Start Time: 1005     PT Stop Time: 1030  PT Total Time (min): 25 min     Billable Minutes: Therapeutic Activity 17 and Therapeutic Exercise 8    Treatment Type: Treatment  PT/PTA: PTA     PTA Visit Number: 4     2022

## 2022-02-02 NOTE — PT/OT/SLP PROGRESS
"Occupational Therapy   Co-Treatment with Physical Therapy    Name: Cecile Bowen  MRN: [id number]  Admitting Diagnosis:  Dialysis patient      Pre-op Diagnosis: * No surgery found *    Recommendations:     Discharge Recommendations: nursing facility, skilled  Discharge Equipment Recommendations:  none  Barriers to discharge:   (requires increased assist)    Assessment:     Cecile Bowen is a 71 y.o. female with a medical diagnosis of Dialysis patient.  She presents with the following performance deficits affecting function are weakness,impaired endurance,impaired self care skills,impaired functional mobilty,gait instability,impaired balance,pain,decreased ROM.     Patient agreeable to OT session and tolerated well. Patient completed bed mobility supine>sit with Min(A) and sit>supine with Mod(A). Patient tolerated ~18 min EOB with (S) and no LOB noted. Patient would benefit from continued OT services to address deficits and progress towards goals. Continue OT POC.    Rehab Prognosis:  Good; patient would benefit from acute skilled OT services to address these deficits and reach maximum level of function.       Plan:     Patient to be seen 3 x/week to address the above listed problems via self-care/home management,therapeutic activities,therapeutic exercises  · Plan of Care Expires: 02/22/22  · Plan of Care Reviewed with: patient    Subjective   "I read one book a day."    Pain/Comfort:  · Pain Rating 1: other (see comments) (Pt did not rate)  · Location - Side 1: Bilateral  · Location - Orientation 1: generalized  · Location 1: back  · Pain Addressed 1: Reposition,Pre-medicate for activity  · Pain Rating Post-Intervention 1: other (see comments) (Pt did not rate)  · Pain Rating 2: other (see comments) (Pt did not rate)  · Location - Side 2: Bilateral  · Location - Orientation 2: generalized  · Location 2: neck  · Pain Addressed 2: Cessation of Activity,Pre-medicate for activity  · Pain Rating Post-Intervention 2: " other (see comments) (Pt did not rate)    Objective:     Communicated with: RN prior to session.  Patient found HOB elevated with  (no ative lines) upon OT entry to room.    General Precautions: Standard, fall,contact   Orthopedic Precautions:N/A   Braces: N/A  Respiratory Status: Room air     Occupational Performance:     Bed Mobility:    · Patient completed Rolling/Turning to Left with  contact guard assistance and with side rail  · Patient completed Scooting anteriorly to plant B feet on floor with contact guard assistance  · Scooting in supine towards HOB using overhead bed rail with CGA  · Patient completed Supine to Sit with minimum assistance, with side rail and HOB elevated with min cues for sequencing  · Patient completed Sit to Supine with moderate assistance, with side rail and HOB flat with BLE management     Functional Mobility/Transfers:  · Did not assess this day secondary to pain    Activities of Daily Living:  · Grooming: supervision for safety to complete grooming and hygiene tasks seated EOB; No LOB noted     Therapeutic Exercise:  · (L)UE AROM and (R)UE AAROM shoulder flexion 1 set x 10 reps  · BUE AROM elbow flexion  · Dynamic sitting activity including unilateral reaching with focus in core activation and crossing midline (1 set x 5 reps alternating BUE) required to increase participation and (I) in ADL tasks.     [facility] 6 Click ADL: 17    Treatment & Education:  Compensatory strategies for bed mobility to decrease level of (A)  Addressed all patient questions within SUZI scope of practice.   Co-treatment performed with PTA due to patient's complexity and benefit of 2 skilled therapists to facilitate functional and safe occupational performance, accommodate patient's activity tolerance, and maximize patient's participation in therapy.     Patient left HOB elevated with all lines intact and call button in reachEducation:      GOALS:   Multidisciplinary Problems     Occupational Therapy Goals         Problem: Occupational Therapy Goal    Goal Priority Disciplines Outcome Interventions   Occupational Therapy Goal     OT, PT/OT Ongoing, Progressing    Description: Goals to be met by: 02-03-22     Patient will increase functional independence with ADLs by performing:    UE Dressing with Set-up Assistance.  Grooming while seated with Set-up Assistance.  Toileting from bedside commode with Moderate Assistance for hygiene and clothing management using drop arm commode and slide board  Sitting at edge of bed x15 minutes with Modified [place].  Supine to sit with Minimal Assistance.  Slide board  transfers with Minimal Assistance.  Pt. To be I with HEP to BUE to improve level of endurance (RUE with RTC injury)  Pt. To perform sit to stand from EOB with Mod A                     Time Tracking:     OT Date of Treatment: 02/02/22  OT Start Time: 1008  OT Stop Time: 1033  OT Total Time (min): 25 min    Billable Minutes:Self Care/Home Management 11  Therapeutic Exercise 14    OT/SUZI: SUZI ARCHER Visit Number: 1 2/2/2022

## 2022-02-02 NOTE — PROGRESS NOTES
[facility] Medicine  Telemedicine Progress Note    Patient Name: Cecile Bowen  MRN: [id number]  Patient Class: IP- Inpatient   Admission Date: 1/21/2022  Length of Stay: 7 days  Attending Physician: Dorothy Kraus MD  Primary Care Provider: Kailey Navarro MD          Subjective:     Principal Problem:Dialysis patient        HPI:  Cecile Bowen maikel 71F with ESRD (on HD), DM2, HTN, breast cancer s/p mastectomy, and recurrent UTI presents with chest heaviness. Pt recently seen in ED for similar chest heaviness. She reports a productive cough and missing some medications. She has a chronic suprapubic catheter of 5 years and is bed bound at baseline, but has a wheelchair for mobility but rarely uses it. She was started on HD 2 months ago and has issues making HD appointments due to transportation. She has missed her last two appointments. Last session was Monday prior to discharge from SNF. She had been at SNF since 12/30/21 when she was admitted after a long hospital stay after being found down at home 11/28/21. During her hospital stay, she was treated for possible STEMI, worsening CKD, metabolic acidosis and required intubation for acute respiratory failure. She was discharged to [facility] and returned home on 1/17/22 after her HD session.    In the ED, she is afebrile and HD stable. Labs positive for dirty UA, hyponatremia, hypokalemia, hypochloridemia, elevated BUN, elevated Cr, and elevated BNP. She was started on Rocephin for UTI and nephrology was consulted for HD. She was admitted to hospital medicine for further management.      Overview/Hospital Course:  Patient admitted for UTI and missed dialysis appointments due to transportation issues. Patient started on Rocephin for UTI and ID was consulted for antibiotic recommendations as patient has history of ESBL and MDRO. Pt transitioned to meropenem followed by Bactrim for 3 days for ESBL UTI. Midline consult ordered  for extended abx coverage. Nephrology was consulted for HD while inpatient. Social work was consulted for discharge planning, working on SNF placement. PT/OT consulted and recommending SNF. Wound care consulted for skin breakdown with recs.     1/28 patient with significant lower extremity edema, Cr 1.8 > 2.1, Na 125. Continue furosemide 160 mg BID per nephrology recs. Per nephrology, even though good urine O/p, patient may benefit from RRT at least 2 times a week for clearances and volume removal. Session of HD planned today. Plan for SNF vs Home with HH, anticipate discharge 1/31.         This encounter was provided through telemedicine.  Patient was transferred to the telemedicine service on:  02/01/2022   The patient location is: [id number] A admitted 1/21/2022 11:36 AM.  Present with the patient at the time of the telemed/virtual assessment: Telepresenter    Interval History/Overnight Events:     Seen in HD; HA again last pm but alleviated with sumatriptan; her wraps her house is built and anticipate discharge once transportation requirements are confirmed    Review of Systems   Constitutional: Negative for fever.   Respiratory: Negative for cough and shortness of breath.    Cardiovascular: Negative for chest pain.   Neurological: Positive for weakness (Generalized).        Inpatient Medications:  Scheduled Meds:   sodium chloride 0.9%   Intravenous Once    sodium chloride 0.9%   Intravenous Once    sodium chloride 0.9%   Intravenous Once    anastrozole  1 mg Oral Daily    apixaban  2.5 mg Oral BID    atorvastatin  80 mg Oral Daily    cloNIDine  0.2 mg Oral TID    epoetin chloe-ebpx (RETACRIT) injection  50 Units/kg Intravenous Every Tues, Thurs, Sat    ergocalciferol  50,000 Units Oral Q7 Days    famotidine  20 mg Oral Daily    furosemide  160 mg Oral BID    gemfibroziL  600 mg Oral Every Mon, Wed, Fri    levothyroxine  50 mcg Oral Before breakfast    lisinopriL  10 mg Oral Daily    metoprolol  tartrate  25 mg Oral BID    mupirocin   Nasal BID    oxybutynin  10 mg Oral Daily    polyethylene glycol  17 g Oral BID    senna-docusate 8.6-50 mg  1 tablet Oral BID    venlafaxine  150 mg Oral Daily     Continuous Infusions:  PRN Meds:.acetaminophen, butalbital-acetaminophen-caffeine -40 mg, dextrose 50%, dextrose 50%, glucagon (human recombinant), glucose, glucose, heparin (porcine), heparin (porcine), heparin (porcine), heparin (porcine), influenza, insulin aspart U-100, melatonin, methocarbamoL, ondansetron, oxyCODONE, sodium chloride 0.9%, sodium chloride 0.9%, sodium chloride 0.9%, sodium chloride 0.9%, sodium chloride 0.9%, sumatriptan      Objective:     Temp:  [97.7 °F (36.5 °C)-99 °F (37.2 °C)] 98.7 °F (37.1 °C)  Pulse:  [64-90] 84  Resp:  [17-19] 19  SpO2:  [90 %-95 %] 90 %  BP: ()/(40-76) 126/60      Intake/Output Summary (Last 24 hours) at 2/1/2022 1755  Last data filed at 2/1/2022 1201  Gross per 24 hour   Intake 1260.02 ml   Output 3150 ml   Net -1889.98 ml        Body mass index is 45.3 kg/m².    Physical Exam  Vitals and nursing note reviewed.   Constitutional:       General: She is not in acute distress.     Appearance: Normal appearance. She is obese.   HENT:      Head: Normocephalic and atraumatic.   Eyes:      General: No scleral icterus.        Right eye: No discharge.         Left eye: No discharge.      Extraocular Movements: Extraocular movements intact.   Cardiovascular:      Rate and Rhythm: Normal rate.   Pulmonary:      Effort: Pulmonary effort is normal. No tachypnea or respiratory distress.   Neurological:      General: No focal deficit present.      Mental Status: She is alert and oriented to person, place, and time.      Cranial Nerves: No cranial nerve deficit.      Motor: No weakness.   Psychiatric:         Attention and Perception: Attention normal.         Mood and Affect: Mood and affect normal.         Speech: Speech normal.         Behavior: Behavior is  [facility].          Labs:  Recent Results (from the past 24 hour(s))   POCT glucose    Collection Time: 01/31/22  8:10 PM   Result Value Ref Range    POCT Glucose 179 (H) 70 - 110 mg/dL   Magnesium    Collection Time: 02/01/22  3:47 AM   Result Value Ref Range    Magnesium 1.5 (L) 1.6 - 2.6 mg/dL   Phosphorus    Collection Time: 02/01/22  3:47 AM   Result Value Ref Range    Phosphorus 5.2 (H) 2.7 - 4.5 mg/dL   CBC auto differential    Collection Time: 02/01/22  3:47 AM   Result Value Ref Range    WBC 7.25 3.90 - 12.70 K/uL    RBC 3.06 (L) 4.00 - 5.40 M/uL    Hemoglobin 8.9 (L) 12.0 - 16.0 g/dL    Hematocrit 28.1 (L) 37.0 - 48.5 %    MCV 92 82 - 98 fL    MCH 29.1 27.0 - 31.0 pg    MCHC 31.7 (L) 32.0 - 36.0 g/dL    RDW 15.1 (H) 11.5 - 14.5 %    Platelets 259 150 - 450 K/uL    MPV 9.5 9.2 - 12.9 fL    Immature Granulocytes 1.0 (H) 0.0 - 0.5 %    Gran # (ANC) 3.9 1.8 - 7.7 K/uL    Immature Grans (Abs) 0.07 (H) 0.00 - 0.04 K/uL    Lymph # 2.2 1.0 - 4.8 K/uL    Mono # 0.8 0.3 - 1.0 K/uL    Eos # 0.3 0.0 - 0.5 K/uL    Baso # 0.04 0.00 - 0.20 K/uL    nRBC 0 0 /100 WBC    Gran % 54.0 38.0 - 73.0 %    Lymph % 29.7 18.0 - 48.0 %    Mono % 10.6 4.0 - 15.0 %    Eosinophil % 4.1 0.0 - 8.0 %    Basophil % 0.6 0.0 - 1.9 %    Differential Method Automated    Basic metabolic panel    Collection Time: 02/01/22  3:47 AM   Result Value Ref Range    Sodium 130 (L) 136 - 145 mmol/L    Potassium 4.6 3.5 - 5.1 mmol/L    Chloride 95 95 - 110 mmol/L    CO2 23 23 - 29 mmol/L    Glucose 164 (H) 70 - 110 mg/dL    BUN 33 (H) 8 - 23 mg/dL    Creatinine 1.8 (H) 0.5 - 1.4 mg/dL    Calcium 8.5 (L) 8.7 - 10.5 mg/dL    Anion Gap 12 8 - 16 mmol/L    eGFR if African American 32.6 (A) >60 mL/min/1.73 m^2    eGFR if non  28.3 (A) >60 mL/min/1.73 m^2   POCT glucose    Collection Time: 02/01/22  7:18 AM   Result Value Ref Range    POCT Glucose 179 (H) 70 - 110 mg/dL   POCT glucose    Collection Time: 02/01/22 11:46 AM   Result Value Ref  Range    POCT Glucose 159 (H) 70 - 110 mg/dL        Lab Results   Component Value Date    [id number] Negative 01/21/2022       Recent Labs   Lab 01/30/22 0450 01/30/22 0450 01/31/22 0450 02/01/22  0347   WBC 6.88  --  6.90 7.25   LYMPH 29.1  2.0   < > 31.0  2.1 29.7  2.2   HGB 8.7*  --  8.0* 8.9*   HCT 28.0*  --  24.8* 28.1*     --  240 259    < > = values in this interval not displayed.     Recent Labs   Lab 01/30/22 0450 01/31/22 0450 02/01/22 0347   * 128* 130*   K 3.3* 3.9 4.6   CL 96 94* 95   CO2 27 28 23   BUN 23 25* 33*   CREATININE 1.5* 1.7* 1.8*   * 152* 164*   CALCIUM 8.5* 7.9* 8.5*   MG 1.6 1.4* 1.5*   PHOS 4.6* 4.6* 5.2*     No results for input(s): ALKPHOS, ALT, AST, ALBUMIN, PROT, BILITOT, INR in the last 168 hours.     No results for input(s): DDIMER, FERRITIN, CRP, LDH, BNP, TROPONINI, CPK in the last 72 hours.    Invalid input(s): PROCALCITONIN    All labs within the last 24 hours were reviewed.     Microbiology:  Microbiology Results (last 7 days)     Procedure Component Value Units Date/Time    Urine culture [[id number]]  (Abnormal)  (Susceptibility) Collected: 01/21/22 1402    Order Status: Completed Specimen: Urine Updated: 01/26/22 1210     Urine Culture, Routine KLEBSIELLA PNEUMONIAE ESBL  > 100,000 cfu/ml      Narrative:      Specimen Source->Urine            Imaging  ECG Results          EKG 12-lead (Final result)  Result time 01/22/22 10:43:30    Final result by Interface, [facility] (01/22/22 10:43:30)                 Narrative:    Test Reason : R07.9,    Vent. Rate : 090 BPM     Atrial Rate : 090 BPM     P-R Int : 150 ms          QRS Dur : 084 ms      QT Int : 372 ms       P-R-T Axes : 003 007 098 degrees     QTc Int : 455 ms    Normal sinus rhythm  Nonspecific T wave abnormality  Abnormal ECG  When compared with ECG of 20-JAN-2022 19:58,  No significant change was found  Confirmed by Crow Bauer MD ([id number]) on 1/22/2022 10:43:13 AM    Referred By:  [email or web address]   SELF           Confirmed By:Crow Bauer MD                              Results for orders placed during the hospital encounter of 11/28/21    Echo    Interpretation Summary  · The left ventricle is normal in size with normal systolic function. The estimated ejection fraction is 60%.  · Normal right ventricular size with normal right ventricular systolic function.  · Normal left ventricular diastolic function.  · Mechanically ventilated; cannot use inferior caval vein diameter to estimate central venous pressure.      X-Ray Chest AP Portable  Narrative: EXAMINATION:  XR CHEST AP PORTABLE    CLINICAL HISTORY:  chest discomfort;    TECHNIQUE:  Single frontal view of the chest was performed.    COMPARISON:  01/20/2022    FINDINGS:  Again noted is right diaphragmatic elevation with a meniscus sign, likely indicating at least a small volume right pleural effusion.  The left lung field and pleural space remains clear.  The heart size appears normal.  There is mild calcification of the aortic knob consistent with atherosclerotic stigmata.  Tunneled right hemodialysis catheter noted.  Impression: No interval detrimental change identified.    Electronically signed by: Rosaline Farah  Date:    01/21/2022  Time:    13:34      All imaging within the last 24 hours was reviewed.       Discharge Planning   FLORENTINO: 2/1/2022     Code Status: Full Code   Is the patient medically ready for discharge?: Yes    Reason for patient still in hospital (select all that apply): Patient trending condition, Treatment and Pending disposition  Discharge Plan A: Skilled Nursing Facility   Discharge Delays: (!) Post-Acute Set-up        Assessment/Plan:      * Dialysis patient  See CKD4  -patient has had trouble getting to dialysis from home and is unable to pay co-pay for skilled nursing; she is having a ramp built at her home and transportation will need to be arranged prior to discharge    Pressure injury of right buttock, stage  "3  Would care consulted  Appreciate recs      Urinary tract infection due to ESBL Klebsiella  See UTI      Anemia of chronic disease  stable      Secondary hypertension  Proteinuria on labs  Added lisinopril 10mg 1/24      Debility  PT/OT consulted  SNF recommended; pt wants OSNF but has a copay for further SNF stay which she cannot provide  -- appreciate SW assistance  -- plan for home with home health once transportation issues resolved (her main reason for missing HD)  -  she has a christina lift at home for transfers    A-fib  Continue apixaban 2.5mg BID  Cont metoprolol      ZAK (acute kidney injury)  Dialysis dependent ZAK  Nephrology consulted, appreciate recs  Suprapubic murillo declogged in ED, see UTI  Low albumin and history of nephrotic syndrome  Plan:  - Trend Cr  - Strict I&Os  - Avoid nephrotoxic agents  - Renally adjust medications  - Prealbumin low  - P/C ratio elevated 9.33  -- History of nephrotic syndrome      Normocytic anemia  At baseline on admission  Anemia of chronic disease    CBC daily  Transfuse if Hbg <7  Checking iron studies, folate, B12, and epo with HD    S/P left mastectomy  See malignant neoplasm of left breast      Requires daily assistance for activities of daily living (ADL) and comfort needs  Cont PT/OT      Malignant neoplasm of left breast, estrogen receptor positive  History of breast cancer status post radical mastectomy on 8/1/2019 on anastrozole   Stable  Continue anastrozole 1mg daily    Cervicogenic migraine  PRNs ordered as she takes at home  -referral for f/u with her neurologist      CKD stage 4 due to type 2 diabetes mellitus  Dialysis dependent    Nephrology consulted, appreciate recs  Last HD 1/25  "S/p HD session today. Pt made 3 lit urine . Will hold off on next HD session and evaluate the need for RRT on daily basis"  Labs pending  Nephrology recs pending for dialysis needs    1/28  -plan for HD today, patient with good urine output, may require RRT twice weekly  - " final nephrology recs pending         Chronic pain  PRN multimodal pain regimen      Long term prescription opiate use   with Percocet and butalbital-aceaminophen-caffeine  See chronic pain, migraines      Morbid obesity due to excess calories  Body mass index is 47.12 kg/m². Morbid obesity complicates all aspects of disease management from diagnostic modalities to treatment. Weight loss encouraged and health benefits explained to patient.     RD consulted  2000 calorie diabetic diet  BOOST    Suprapubic catheter  See neurogenic bladder      Controlled type 2 diabetes mellitus with stage 4 chronic kidney disease, with long-term current use of insulin  A1c (1/4/2022) 5.4%  Diabetic diet  Continue to monitor  Sliding scale NovoLog insulin as needed for hyperglycemia  Glucose monitoring    Major depressive disorder, recurrent episode, moderate  Not on any home medications  Continue to monitor  Consider psych consult if worsening affect        Neurogenic bladder  Continue oxybutynin 10mg daily, furosemide 160mg BID  Suprapubic catheter      Recurrent urinary tract infection  History of ESBL and MDRO with current UTI symptoms, dirty UA, and suprapubic catheter.  Plan:  - consult ID, appreciate recs  --  Plan on 3d of Meropenem (D3/3) then DC on Bactrim DS qod x 3 doses  -- Consider cont meropenem vs Bactrim if patient goes to OSNF  -- Exchange murillo cath after abx are complete  - UCx ESBL  I/D Recs as follows:  Plan: 1. Continue Meropenem x 7d total (from SP change 1/23 as urine still cloudy) - eoc 1/30 2. Rec SNF placement to complete IV abx 3. Contact isolation given prior ESBL 4. weekl CBC and CMP while on meropene 5. Discussed with ID staff 6. Needs urology close fu after dc 7. Will sign off  With end date of antibiotics 1/30/22          Hyponatremia  Chronic hyponatremia 130 baseline, 126 on admission  Secondary to fluid overload, UTI  Nephro consulted for HD  Na improved      VIJAYA (obstructive sleep apnea)  CPAP  QHS      Hyperlipidemia associated with type 2 diabetes mellitus  Continue atorvastatin 80mg daily, gemfibrozil 600mg MWF      Fibromyalgia  PT/OT  Pain regimen      Hypothyroidism  Continue levothyroxine 50mcg daily        VTE Risk Mitigation (From admission, onward)         Ordered     heparin (porcine) injection 1,000 Units  As needed (PRN)         02/01/22 0803     heparin (porcine) injection 1,000 Units  As needed (PRN)         01/31/22 0715     heparin (porcine) injection 1,000 Units  As needed (PRN)         01/28/22 1038     heparin (porcine) injection 1,000 Units  As needed (PRN)         01/24/22 0828     apixaban tablet 2.5 mg  2 times daily         01/21/22 1609     IP VTE HIGH RISK PATIENT  Once         01/21/22 1609     Place sequential compression device  Until discontinued         01/21/22 1609                I have assessed these findings virtually using a telemed platform and with assistance of the bedside nurse.        The attending portion of this evaluation, treatment, and documentation was performed per Dorothy Kraus MD via Telemedicine AudioVisual using the secure [employer] software platform with 2 way audio/video. The provider was located off-site and the patient is located in the hospital. The aforementioned video software was utilized to document the relevant history and physical exam    Dorothy Kraus MD  Department of Hospital Medicine   [facility]

## 2022-02-02 NOTE — PLAN OF CARE
Left VM with sister, Kat Sánchez ([phone]) to discuss if she could assist with transferring patient from bed to wheelchair for transport to HD. Will continue to follow.    Made contact with patient's sister, Kat. She reports that she can come meet with PT/OT team at 3:00 PM on Thursday, 2/3/2022. Reached out to PT/OT and they reported that was fine.    Alena Wright, [facility] [place]  [phone]

## 2022-02-02 NOTE — SUBJECTIVE & OBJECTIVE
This encounter was provided through telemedicine.  Patient was transferred to the telemedicine service on:  01/26/2022   The patient location is: 608/608 A admitted 1/21/2022 11:36 AM.  Present with the patient at the time of the telemed/virtual assessment: Telepresenter    Interval History/Overnight Events:     Patient without complaint; calling medicaid to see if she can get waiver program for sitters.  Most of the visit was spent discussing previous dialysis arrangments.    Review of Systems   Constitutional: Negative for fever.   Respiratory: Negative for cough and shortness of breath.    Cardiovascular: Negative for chest pain.   Neurological: Positive for weakness (Generalized).        Inpatient Medications:  Scheduled Meds:   sodium chloride 0.9%   Intravenous Once    sodium chloride 0.9%   Intravenous Once    sodium chloride 0.9%   Intravenous Once    anastrozole  1 mg Oral Daily    apixaban  2.5 mg Oral BID    atorvastatin  80 mg Oral Daily    cloNIDine  0.2 mg Oral TID    epoetin chloe-ebpx (RETACRIT) injection  50 Units/kg Intravenous Every Tues, Thurs, Sat    ergocalciferol  50,000 Units Oral Q7 Days    famotidine  20 mg Oral Daily    furosemide  160 mg Oral BID    gemfibroziL  600 mg Oral Every Mon, Wed, Fri    levothyroxine  50 mcg Oral Before breakfast    lisinopriL  10 mg Oral Daily    metoprolol tartrate  25 mg Oral BID    oxybutynin  10 mg Oral Daily    polyethylene glycol  17 g Oral BID    senna-docusate 8.6-50 mg  1 tablet Oral BID    venlafaxine  150 mg Oral Daily     Continuous Infusions:  PRN Meds:.acetaminophen, butalbital-acetaminophen-caffeine -40 mg, dextrose 50%, dextrose 50%, glucagon (human recombinant), glucose, glucose, heparin (porcine), heparin (porcine), heparin (porcine), heparin (porcine), influenza, insulin aspart U-100, melatonin, methocarbamoL, ondansetron, oxyCODONE, sodium chloride 0.9%, sodium chloride 0.9%, sodium chloride 0.9%, sodium chloride  0.9%, sodium chloride 0.9%, sumatriptan      Objective:     Temp:  [97.7 °F (36.5 °C)-99.5 °F (37.5 °C)] 98.8 °F (37.1 °C)  Pulse:  [69-84] 74  Resp:  [16-20] 17  SpO2:  [90 %-95 %] 94 %  BP: ()/(56-77) 165/76      Intake/Output Summary (Last 24 hours) at 2/2/2022 1100  Last data filed at 2/1/2022 1201  Gross per 24 hour   Intake 600.02 ml   Output 2350 ml   Net -1749.98 ml        Body mass index is 45.3 kg/m².    Physical Exam  Vitals and nursing note reviewed.   Constitutional:       General: She is not in acute distress.     Appearance: Normal appearance. She is obese.   HENT:      Head: Normocephalic and atraumatic.   Eyes:      General: No scleral icterus.        Right eye: No discharge.         Left eye: No discharge.      Extraocular Movements: Extraocular movements intact.   Cardiovascular:      Rate and Rhythm: Normal rate.   Pulmonary:      Effort: Pulmonary effort is normal. No tachypnea or respiratory distress.   Neurological:      General: No focal deficit present.      Mental Status: She is alert and oriented to person, place, and time.      Cranial Nerves: No cranial nerve deficit.      Motor: No weakness.   Psychiatric:         Attention and Perception: Attention normal.         Mood and Affect: Mood and affect normal.         Speech: Speech normal.         Behavior: Behavior is cooperative.          Labs:  Recent Results (from the past 24 hour(s))   POCT glucose    Collection Time: 02/01/22 11:46 AM   Result Value Ref Range    POCT Glucose 159 (H) 70 - 110 mg/dL   POCT glucose    Collection Time: 02/01/22  3:52 PM   Result Value Ref Range    POCT Glucose 170 (H) 70 - 110 mg/dL   POCT glucose    Collection Time: 02/01/22  9:39 PM   Result Value Ref Range    POCT Glucose 195 (H) 70 - 110 mg/dL   Magnesium    Collection Time: 02/02/22  4:06 AM   Result Value Ref Range    Magnesium 1.5 (L) 1.6 - 2.6 mg/dL   Phosphorus    Collection Time: 02/02/22  4:06 AM   Result Value Ref Range    Phosphorus 3.6  2.7 - 4.5 mg/dL   CBC auto differential    Collection Time: 02/02/22  4:06 AM   Result Value Ref Range    WBC 6.41 3.90 - 12.70 K/uL    RBC 2.75 (L) 4.00 - 5.40 M/uL    Hemoglobin 8.0 (L) 12.0 - 16.0 g/dL    Hematocrit 25.4 (L) 37.0 - 48.5 %    MCV 92 82 - 98 fL    MCH 29.1 27.0 - 31.0 pg    MCHC 31.5 (L) 32.0 - 36.0 g/dL    RDW 15.4 (H) 11.5 - 14.5 %    Platelets 248 150 - 450 K/uL    MPV 9.7 9.2 - 12.9 fL    Immature Granulocytes 1.2 (H) 0.0 - 0.5 %    Gran # (ANC) 3.2 1.8 - 7.7 K/uL    Immature Grans (Abs) 0.08 (H) 0.00 - 0.04 K/uL    Lymph # 2.1 1.0 - 4.8 K/uL    Mono # 0.7 0.3 - 1.0 K/uL    Eos # 0.3 0.0 - 0.5 K/uL    Baso # 0.05 0.00 - 0.20 K/uL    nRBC 0 0 /100 WBC    Gran % 49.6 38.0 - 73.0 %    Lymph % 32.8 18.0 - 48.0 %    Mono % 10.9 4.0 - 15.0 %    Eosinophil % 4.7 0.0 - 8.0 %    Basophil % 0.8 0.0 - 1.9 %    Differential Method Automated    Basic metabolic panel    Collection Time: 02/02/22  4:06 AM   Result Value Ref Range    Sodium 133 (L) 136 - 145 mmol/L    Potassium 3.5 3.5 - 5.1 mmol/L    Chloride 97 95 - 110 mmol/L    CO2 29 23 - 29 mmol/L    Glucose 160 (H) 70 - 110 mg/dL    BUN 25 (H) 8 - 23 mg/dL    Creatinine 1.6 (H) 0.5 - 1.4 mg/dL    Calcium 8.1 (L) 8.7 - 10.5 mg/dL    Anion Gap 7 (L) 8 - 16 mmol/L    eGFR if African American 37.6 (A) >60 mL/min/1.73 m^2    eGFR if non  32.6 (A) >60 mL/min/1.73 m^2   POCT glucose    Collection Time: 02/02/22  7:22 AM   Result Value Ref Range    POCT Glucose 184 (H) 70 - 110 mg/dL        Lab Results   Component Value Date    [id number] Negative 01/21/2022       Recent Labs   Lab 01/31/22 0450 01/31/22 0450 02/01/22 0347 02/02/22  0406   WBC 6.90  --  7.25 6.41   LYMPH 31.0  2.1   < > 29.7  2.2 32.8  2.1   HGB 8.0*  --  8.9* 8.0*   HCT 24.8*  --  28.1* 25.4*     --  259 248    < > = values in this interval not displayed.     Recent Labs   Lab 01/31/22 0450 02/01/22 0347 02/02/22  0406   * 130* 133*   K 3.9 4.6 3.5   CL  94* 95 97   CO2 28 23 29   BUN 25* 33* 25*   CREATININE 1.7* 1.8* 1.6*   * 164* 160*   CALCIUM 7.9* 8.5* 8.1*   MG 1.4* 1.5* 1.5*   PHOS 4.6* 5.2* 3.6     No results for input(s): ALKPHOS, ALT, AST, ALBUMIN, PROT, BILITOT, INR in the last 168 hours.     No results for input(s): DDIMER, FERRITIN, CRP, LDH, BNP, TROPONINI, CPK in the last 72 hours.    Invalid input(s): PROCALCITONIN    All labs within the last 24 hours were reviewed.     Microbiology:  Microbiology Results (last 7 days)     Procedure Component Value Units Date/Time    Urine culture [[id number]]  (Abnormal)  (Susceptibility) Collected: 01/21/22 1402    Order Status: Completed Specimen: Urine Updated: 01/26/22 1210     Urine Culture, Routine KLEBSIELLA PNEUMONIAE ESBL  > 100,000 cfu/ml      Narrative:      Specimen Source->Urine            Imaging  ECG Results          EKG 12-lead (Final result)  Result time 01/22/22 10:43:30    Final result by Interface, Lab In [facility] (01/22/22 10:43:30)                 Narrative:    Test Reason : R07.9,    Vent. Rate : 090 BPM     Atrial Rate : 090 BPM     P-R Int : 150 ms          QRS Dur : 084 ms      QT Int : 372 ms       P-R-T Axes : 003 007 098 degrees     QTc Int : 455 ms    Normal sinus rhythm  Nonspecific T wave abnormality  Abnormal ECG  When compared with ECG of 20-JAN-2022 19:58,  No significant change was found  Confirmed by Juancarlos DANIEL, Crow HICKS ([id number]) on 1/22/2022 10:43:13 AM    Referred By: [email or web address]   SELF           Confirmed By:Crow Bauer MD                              Results for orders placed during the hospital encounter of 11/28/21    Echo    Interpretation Summary  · The left ventricle is normal in size with normal systolic function. The estimated ejection fraction is 60%.  · Normal right ventricular size with normal right ventricular systolic function.  · Normal left ventricular diastolic function.  · Mechanically ventilated; cannot use inferior caval vein diameter to estimate central  venous pressure.      X-Ray Chest AP Portable  Narrative: EXAMINATION:  XR CHEST AP PORTABLE    CLINICAL HISTORY:  chest discomfort;    TECHNIQUE:  Single frontal view of the chest was performed.    COMPARISON:  01/20/2022    FINDINGS:  Again noted is right diaphragmatic elevation with a meniscus sign, likely indicating at least a small volume right pleural effusion.  The left lung field and pleural space remains clear.  The heart size appears normal.  There is mild calcification of the aortic knob consistent with atherosclerotic stigmata.  Tunneled right hemodialysis catheter noted.  Impression: No interval detrimental change identified.    Electronically signed by: Rosaline Farah  Date:    01/21/2022  Time:    13:34      All imaging within the last 24 hours was reviewed.       Discharge Planning   FLORENTINO: 2/7/2022     Code Status: Full Code   Is the patient medically ready for discharge?: Yes    Reason for patient still in hospital (select all that apply): Patient trending condition, Treatment and Pending disposition  Discharge Plan A: Skilled Nursing Facility   Discharge Delays: (!) Post-Acute Set-up

## 2022-02-03 LAB
ANION GAP SERPL CALC-SCNC: 8 MMOL/L (ref 8–16)
BASOPHILS # BLD AUTO: 0.04 K/UL (ref 0–0.2)
BASOPHILS NFR BLD: 0.6 % (ref 0–1.9)
BUN SERPL-MCNC: 30 MG/DL (ref 8–23)
CALCIUM SERPL-MCNC: 8.8 MG/DL (ref 8.7–10.5)
CHLORIDE SERPL-SCNC: 95 MMOL/L (ref 95–110)
CO2 SERPL-SCNC: 29 MMOL/L (ref 23–29)
CREAT SERPL-MCNC: 1.8 MG/DL (ref 0.5–1.4)
DIFFERENTIAL METHOD: ABNORMAL
EOSINOPHIL # BLD AUTO: 0.4 K/UL (ref 0–0.5)
EOSINOPHIL NFR BLD: 5.2 % (ref 0–8)
ERYTHROCYTE [DISTWIDTH] IN BLOOD BY AUTOMATED COUNT: 14.7 % (ref 11.5–14.5)
EST. GFR  (AFRICAN AMERICAN): 32.6 ML/MIN/1.73 M^2
EST. GFR  (NON AFRICAN AMERICAN): 28.3 ML/MIN/1.73 M^2
GLUCOSE SERPL-MCNC: 173 MG/DL (ref 70–110)
HCT VFR BLD AUTO: 25.8 % (ref 37–48.5)
HGB BLD-MCNC: 8.3 G/DL (ref 12–16)
IMM GRANULOCYTES # BLD AUTO: 0.07 K/UL (ref 0–0.04)
IMM GRANULOCYTES NFR BLD AUTO: 1 % (ref 0–0.5)
LYMPHOCYTES # BLD AUTO: 2.1 K/UL (ref 1–4.8)
LYMPHOCYTES NFR BLD: 30.8 % (ref 18–48)
MAGNESIUM SERPL-MCNC: 1.5 MG/DL (ref 1.6–2.6)
MCH RBC QN AUTO: 29.3 PG (ref 27–31)
MCHC RBC AUTO-ENTMCNC: 32.2 G/DL (ref 32–36)
MCV RBC AUTO: 91 FL (ref 82–98)
MONOCYTES # BLD AUTO: 0.6 K/UL (ref 0.3–1)
MONOCYTES NFR BLD: 8.9 % (ref 4–15)
NEUTROPHILS # BLD AUTO: 3.6 K/UL (ref 1.8–7.7)
NEUTROPHILS NFR BLD: 53.5 % (ref 38–73)
NRBC BLD-RTO: 0 /100 WBC
PHOSPHATE SERPL-MCNC: 4.6 MG/DL (ref 2.7–4.5)
PLATELET # BLD AUTO: 246 K/UL (ref 150–450)
PMV BLD AUTO: 9.4 FL (ref 9.2–12.9)
POCT GLUCOSE: 147 MG/DL (ref 70–110)
POCT GLUCOSE: 195 MG/DL (ref 70–110)
POCT GLUCOSE: 220 MG/DL (ref 70–110)
POCT GLUCOSE: 281 MG/DL (ref 70–110)
POTASSIUM SERPL-SCNC: 3.7 MMOL/L (ref 3.5–5.1)
RBC # BLD AUTO: 2.83 M/UL (ref 4–5.4)
SODIUM SERPL-SCNC: 132 MMOL/L (ref 136–145)
WBC # BLD AUTO: 6.72 K/UL (ref 3.9–12.7)

## 2022-02-03 PROCEDURE — 25000003 PHARM REV CODE 250: Mod: HCNC | Performed by: NURSE PRACTITIONER

## 2022-02-03 PROCEDURE — 25000003 PHARM REV CODE 250: Mod: HCNC | Performed by: STUDENT IN AN ORGANIZED HEALTH CARE EDUCATION/TRAINING PROGRAM

## 2022-02-03 PROCEDURE — 63600175 PHARM REV CODE 636 W HCPCS: Mod: HCNC

## 2022-02-03 PROCEDURE — 63600175 PHARM REV CODE 636 W HCPCS: Mod: HCNC | Performed by: STUDENT IN AN ORGANIZED HEALTH CARE EDUCATION/TRAINING PROGRAM

## 2022-02-03 PROCEDURE — 25000003 PHARM REV CODE 250: Mod: HCNC

## 2022-02-03 PROCEDURE — 97530 THERAPEUTIC ACTIVITIES: CPT | Mod: HCNC,CQ

## 2022-02-03 PROCEDURE — 84100 ASSAY OF PHOSPHORUS: CPT | Mod: HCNC

## 2022-02-03 PROCEDURE — 85025 COMPLETE CBC W/AUTO DIFF WBC: CPT | Mod: HCNC

## 2022-02-03 PROCEDURE — 90935 HEMODIALYSIS ONE EVALUATION: CPT | Mod: HCNC

## 2022-02-03 PROCEDURE — 27000207 HC ISOLATION: Mod: HCNC

## 2022-02-03 PROCEDURE — 36415 COLL VENOUS BLD VENIPUNCTURE: CPT | Mod: HCNC

## 2022-02-03 PROCEDURE — 80048 BASIC METABOLIC PNL TOTAL CA: CPT | Mod: HCNC

## 2022-02-03 PROCEDURE — 81001 URINALYSIS AUTO W/SCOPE: CPT | Mod: HCNC | Performed by: INTERNAL MEDICINE

## 2022-02-03 PROCEDURE — 83735 ASSAY OF MAGNESIUM: CPT | Mod: HCNC

## 2022-02-03 PROCEDURE — 11000001 HC ACUTE MED/SURG PRIVATE ROOM: Mod: HCNC

## 2022-02-03 PROCEDURE — 80100014 HC HEMODIALYSIS 1:1: Mod: HCNC

## 2022-02-03 PROCEDURE — 63600175 PHARM REV CODE 636 W HCPCS: Mod: JG,HCNC | Performed by: NURSE PRACTITIONER

## 2022-02-03 RX ORDER — HEPARIN SODIUM 1000 [USP'U]/ML
1000 INJECTION, SOLUTION INTRAVENOUS; SUBCUTANEOUS
Status: DISCONTINUED | OUTPATIENT
Start: 2022-02-03 | End: 2022-02-05 | Stop reason: HOSPADM

## 2022-02-03 RX ORDER — SODIUM CHLORIDE 9 MG/ML
INJECTION, SOLUTION INTRAVENOUS ONCE
Status: COMPLETED | OUTPATIENT
Start: 2022-02-03 | End: 2022-02-03

## 2022-02-03 RX ADMIN — Medication 6 MG: at 08:02

## 2022-02-03 RX ADMIN — OXYCODONE HYDROCHLORIDE 10 MG: 10 TABLET ORAL at 08:02

## 2022-02-03 RX ADMIN — HEPARIN SODIUM 1000 UNITS: 1000 INJECTION, SOLUTION INTRAVENOUS; SUBCUTANEOUS at 12:02

## 2022-02-03 RX ADMIN — METOPROLOL TARTRATE 25 MG: 25 TABLET, FILM COATED ORAL at 08:02

## 2022-02-03 RX ADMIN — CLONIDINE HYDROCHLORIDE 0.2 MG: 0.2 TABLET ORAL at 02:02

## 2022-02-03 RX ADMIN — FUROSEMIDE 160 MG: 80 TABLET ORAL at 08:02

## 2022-02-03 RX ADMIN — BUTALBITAL, ACETAMINOPHEN, AND CAFFEINE 1 TABLET: 50; 325; 40 TABLET ORAL at 02:02

## 2022-02-03 RX ADMIN — APIXABAN 2.5 MG: 2.5 TABLET, FILM COATED ORAL at 08:02

## 2022-02-03 RX ADMIN — EPOETIN ALFA-EPBX 6000 UNITS: 10000 INJECTION, SOLUTION INTRAVENOUS; SUBCUTANEOUS at 12:02

## 2022-02-03 RX ADMIN — ACETAMINOPHEN 650 MG: 325 TABLET ORAL at 12:02

## 2022-02-03 RX ADMIN — LEVOTHYROXINE SODIUM 50 MCG: 50 TABLET ORAL at 07:02

## 2022-02-03 RX ADMIN — SENNOSIDES AND DOCUSATE SODIUM 1 TABLET: 50; 8.6 TABLET ORAL at 08:02

## 2022-02-03 RX ADMIN — METHOCARBAMOL 750 MG: 750 TABLET ORAL at 08:02

## 2022-02-03 RX ADMIN — INSULIN ASPART 1 UNITS: 100 INJECTION, SOLUTION INTRAVENOUS; SUBCUTANEOUS at 11:02

## 2022-02-03 RX ADMIN — SODIUM CHLORIDE: 0.9 INJECTION, SOLUTION INTRAVENOUS at 09:02

## 2022-02-03 NOTE — SUBJECTIVE & OBJECTIVE
"Telemedicine  This service was provided by Virtual Visit.    Patient was transferred to [facility] on:  1/26/2022     Chief Complaint   Patient presents with    dialysis     PT has not had dialysis since Monday and missed dialysis Wed.      The patient location is: 608/608 A   Admitted 1/21/2022 11:36 AM  Present with the patient at the time of the telemed/virtual assessment: Telepresenter    Interval History / Events Overnight:   The patient is able to provide adequate history. Additional history was obtained from past medical records. No significant events reported by Nursing. Nurse reports "pink" urine this PM.  Patient complains of typical chronic HA. Symptoms have worsened since yesterday. Associated symptoms include: fatigue. Symptoms are increasing in severity.     Lab test(s) reviewed: H&H stable    Review of Systems   Constitutional: Negative for fever.   Respiratory: Negative for shortness of breath.      Objective:     Vital Signs (Most Recent):  Temp: 98 °F (36.7 °C) (02/03/22 0923)  Pulse: 71 (02/03/22 1130)  Resp: 18 (02/03/22 0923)  BP: (!) 118/52 (02/03/22 1130)  SpO2: (!) 94 % (02/03/22 0820) Vital Signs (24h Range):  Temp:  [97.9 °F (36.6 °C)-99.1 °F (37.3 °C)] 98 °F (36.7 °C)  Pulse:  [62-75] 71  Resp:  [16-20] 18  SpO2:  [91 %-95 %] 94 %  BP: (118-158)/(52-77) 118/52     Weight: 116 kg (255 lb 11.7 oz)  Body mass index is 45.3 kg/m².    Intake/Output Summary (Last 24 hours) at 2/3/2022 1142  Last data filed at 2/3/2022 0700  Gross per 24 hour   Intake 654 ml   Output 850 ml   Net -196 ml      Physical Exam  Constitutional:       General: She is not in acute distress.     Appearance: Normal appearance. She is not diaphoretic.   Eyes:      General: Lids are normal. No scleral icterus.        Right eye: No discharge.         Left eye: No discharge.      Conjunctiva/sclera: Conjunctivae normal.   Cardiovascular:      Rate and Rhythm: Normal rate.   Pulmonary:      Effort: Pulmonary " effort is normal. No tachypnea, accessory muscle usage or respiratory distress.   Abdominal:      General: There is no distension.   Skin:     Coloration: Skin is not cyanotic.   Neurological:      Mental Status: She is alert. She is not disoriented.   Psychiatric:         Attention and Perception: Attention normal.         Mood and Affect: Affect normal.         Behavior: Behavior is cooperative.         Significant Labs:     Recent Labs   Lab 09/05/21  2139 12/21/21  0803 01/03/22  0000   HGBA1C 6.6* 5.3 5.4   :   Recent Labs   Lab 02/03/22  0724 02/03/22  1214 02/03/22  1604   POCTGLUCOSE 195* 147* 220*     Recent Labs   Lab 02/01/22  0347 02/02/22  0406 02/03/22  0401   WBC 7.25 6.41 6.72   HGB 8.9* 8.0* 8.3*   HCT 28.1* 25.4* 25.8*    248 246     Recent Labs   Lab 02/01/22  0347 02/01/22  0347 02/02/22  0406 02/03/22  0401   GRAN 54.0  3.9   < > 49.6  3.2 53.5  3.6   LYMPH 29.7  2.2   < > 32.8  2.1 30.8  2.1   MONO 10.6  0.8   < > 10.9  0.7 8.9  0.6   EOS 0.3  --  0.3 0.4    < > = values in this interval not displayed.     Recent Labs   Lab 02/01/22  0347 02/02/22  0406 02/03/22  0401   * 133* 132*   K 4.6 3.5 3.7   CL 95 97 95   CO2 23 29 29   BUN 33* 25* 30*   CREATININE 1.8* 1.6* 1.8*   * 160* 173*   CALCIUM 8.5* 8.1* 8.8   MG 1.5* 1.5* 1.5*   PHOS 5.2* 3.6 4.6*     Recent Labs   Lab 09/28/21  1749 09/28/21 2112 10/01/21  0351 11/1952 11/29/21  0413 12/06/21  0758 01/03/22  0000 01/23/22  0332   PROCAL  --   --   --   --   --  0.69*  --   --    LACTATE 1.1 1.1  --  0.8  --   --   --   --    FERRITIN  --   --    < >  --  757*  --  353* 812*    < > = values in this interval not displayed.     Results for orders placed or performed in visit on 01/12/22   Vitamin D   Result Value Ref Range    Vit D, 25-Hydroxy 61.7 30.0 - 100.0 ng/mL     SARS-CoV2 (COVID-19) Qualitative PCR (no units)   Date Value   12/28/2021 Not Detected   12/21/2021 Not Detected   09/05/2021 Not Detected      POC Rapid COVID (no units)   Date Value   01/21/2022 Negative   01/20/2022 Negative   11/28/2021 Negative   09/28/2021 Negative   09/05/2021 Negative   05/14/2021 Negative       ECG Results          EKG 12-lead (Final result)  Result time 01/22/22 10:43:30    Final result by Interface, Lab In [facility] (01/22/22 10:43:30)                 Narrative:    Test Reason : R07.9,    Vent. Rate : 090 BPM     Atrial Rate : 090 BPM     P-R Int : 150 ms          QRS Dur : 084 ms      QT Int : 372 ms       P-R-T Axes : 003 007 098 degrees     QTc Int : 455 ms    Normal sinus rhythm  Nonspecific T wave abnormality  Abnormal ECG  When compared with ECG of 20-JAN-2022 19:58,  No significant change was found  Confirmed by Juancarlos ADNIEL, Crow HICKS ([id number]) on 1/22/2022 10:43:13 AM    Referred By: [email or web address]   SELF           Confirmed By:Crow Bauer MD                              Results for orders placed during the hospital encounter of 11/28/21    Echo    Interpretation Summary  · The left ventricle is normal in size with normal systolic function. The estimated ejection fraction is 60%.  · Normal right ventricular size with normal right ventricular systolic function.  · Normal left ventricular diastolic function.  · Mechanically ventilated; cannot use inferior caval vein diameter to estimate central venous pressure.      X-Ray Chest AP Portable  Narrative: EXAMINATION:  XR CHEST AP PORTABLE    CLINICAL HISTORY:  chest discomfort;    TECHNIQUE:  Single frontal view of the chest was performed.    COMPARISON:  01/20/2022    FINDINGS:  Again noted is right diaphragmatic elevation with a meniscus sign, likely indicating at least a small volume right pleural effusion.  The left lung field and pleural space remains clear.  The heart size appears normal.  There is mild calcification of the aortic knob consistent with atherosclerotic stigmata.  Tunneled right hemodialysis catheter noted.  Impression: No interval detrimental change  identified.    Electronically signed by: Rosaline Farah  Date:    01/21/2022  Time:    13:34      Labs and Imaging within the last 24 hours listed above were reviewed.       Diet: Diet diabetic [facility]; 2000 Calorie; Renal  GI Prophylaxis: Continued, chronic acid suppression therapy as OP and Indicated due to multiple minor risk factors  Significant LDAs:   IV Access Type: Dialysis Access  Urinary Catheter Indication if present: Patient Does Not Have Urinary Catheter  Other Lines/Tubes/Drains:    HIGH RISK CONDITION(S):   Patient has an abrupt change in neurologic status: Weakness     Goals of Care:    Previous admission:  1/20/22  Likely prognosis:  Fair  Code Status: Full Code  Comfort Only: No  Hospice: No  Goals at discharge: remain at home, with physician follow-up    Discharge Planning   FLORENTINO: 2/7/2022     Code Status: Full Code   Is the patient medically ready for discharge?: Yes    Reason for patient still in hospital (select all that apply): Patient trending condition, Laboratory test and Pending disposition  Discharge Plan A: Skilled Nursing Facility   Discharge Delays: (!) Post-Acute Set-up

## 2022-02-03 NOTE — PROGRESS NOTES
Dialysis complete.  Blood returned.  RIJ PC flushed, heparin locked, capped and taped.  Net UF 2L.  Tolerated well.

## 2022-02-03 NOTE — PLAN OF CARE
Problem: Skin Injury Risk Increased  Goal: Skin Health and Integrity  Outcome: Ongoing, Progressing     Problem: Adult Inpatient Plan of Care  Goal: Plan of Care Review  Outcome: Ongoing, Progressing  Goal: Patient-Specific Goal (Individualized)  Outcome: Ongoing, Progressing     Problem: Diabetes Comorbidity  Goal: Blood Glucose Level Within Targeted Range  Outcome: Ongoing, Progressing   Alert orient times 4. SLept most of the shift after taking sleeping aid. Suprapubic cath with 300 ML edilson color output. NO fall. VSS

## 2022-02-03 NOTE — PT/OT/SLP PROGRESS
"Physical Therapy Treatment    Patient Name:  Cecile Bowen   MRN:  [id number]    Recommendations:     Discharge Recommendations:  nursing facility, skilled   Discharge Equipment Recommendations: hospital bed   Barriers to discharge: Decreased caregiver support    Assessment:     Cecile Bowen is a 71 y.o. female admitted with a medical diagnosis of Dialysis patient.  She presents with the following impairments/functional limitations:  weakness,impaired endurance,impaired self care skills,impaired functional mobilty,decreased lower extremity function,decreased upper extremity function,pain.    Patient and sister present in room. Sister reports that a christina was delivered to their home but she was never trained on how to use it. Reports that EMTs transport would christina patient into wheelchair to take her to dialysis. Kat ( patient sister) is requesting that home health PT train her on christina use as no christina is present for training in this setting. Ms. Huber transferred to EOB with SBA with HOB elevated slightly. She was c/o increased pain to head and neck and requested to return to supine. Patient does not stand but does well at bed level. Sister is confident that she will be able to christina patient once trained and that she will be able to clean patient when she cannot after a BM. Will continue PT per pOC.     Rehab Prognosis: Poor; patient would benefit from acute skilled PT services to address these deficits and reach maximum level of function.    Recent Surgery: * No surgery found *      Plan:     During this hospitalization, patient to be seen 3 x/week to address the identified rehab impairments via therapeutic activities,therapeutic exercises and progress toward the following goals:    · Plan of Care Expires:  02/22/22    Subjective     Chief Complaint: pain to neck and head  Patient/Family Comments/goals: "I can clean and dress myself".   Pain/Comfort:  · Pain Rating 1: 6/10  · Location - Orientation 1: " generalized  · Location 1: head  · Pain Addressed 1: Reposition,Pre-medicate for activity      Objective:     Communicated with Nurse prior to session.  Patient found supine with peripheral IV,murillo catheter upon PT entry to room.     General Precautions: Standard, fall   Orthopedic Precautions:N/A   Braces: N/A  Respiratory Status: Room air     Functional Mobility:  · Bed Mobility:     · Scooting: maximal assistance  · Supine to Sit: stand by assistance  · Sit to Supine: minimum assistance and for LEs only      AM-PAC 6 CLICK MOBILITY  Turning over in bed (including adjusting bedclothes, sheets and blankets)?: 3  Sitting down on and standing up from a chair with arms (e.g., wheelchair, bedside commode, etc.): 2  Moving from lying on back to sitting on the side of the bed?: 3  Moving to and from a bed to a chair (including a wheelchair)?: 1  Need to walk in hospital room?: 1  Climbing 3-5 steps with a railing?: 1  Basic Mobility Total Score: 11       Therapeutic Activities and Exercises:   -white board updated  -Sister educ on patient needs and understands what her role will be.     Patient left supine with all lines intact, call button in reach and sister present..    GOALS:   Multidisciplinary Problems     Physical Therapy Goals        Problem: Physical Therapy Goal    Goal Priority Disciplines Outcome Goal Variances Interventions   Physical Therapy Goal     PT, PT/OT Ongoing, Progressing     Description: Goals to be met by: 22    Patient will increase functional independence with mobility by performin. Supine to sit with MInimal Assistance  2. Sit to supine with MInimal Assistance  3. Sit to stand transfer with Maximum Assistance  4.Patient will demonstrate independence with a home exercise program  5. Bed to chair transfer with Contact Guard Assistance using Slideboard                   Time Tracking:     PT Received On: 22  PT Start Time: 1531     PT Stop Time: 1602  PT Total Time (min): 31 min      Billable Minutes: Therapeutic Activity 31    Treatment Type: Treatment  PT/PTA: PTA     PTA Visit Number: 5     02/03/2022

## 2022-02-03 NOTE — SUBJECTIVE & OBJECTIVE
Interval History: no significant changes    Review of patient's allergies indicates:  No Known Allergies  Current Facility-Administered Medications   Medication Frequency    0.9%  NaCl infusion Once    acetaminophen tablet 650 mg Q4H PRN    anastrozole tablet 1 mg Daily    apixaban tablet 2.5 mg BID    atorvastatin tablet 80 mg Daily    butalbital-acetaminophen-caffeine -40 mg per tablet 1 tablet Daily PRN    cloNIDine tablet 0.2 mg TID    dextrose 50% injection 12.5 g PRN    dextrose 50% injection 25 g PRN    epoetin chloe-epbx injection 6,000 Units Every Tues, Thurs, Sat    ergocalciferol capsule 50,000 Units Q7 Days    famotidine tablet 20 mg Daily    furosemide tablet 160 mg BID    gemfibroziL tablet 600 mg Every Mon, Wed, Fri    glucagon (human recombinant) injection 1 mg PRN    glucose chewable tablet 16 g PRN    glucose chewable tablet 24 g PRN    heparin (porcine) injection 1,000 Units PRN    influenza (QUADRIVALENT ADJUVANTED PF) vaccine 0.5 mL vaccine x 1 dose    insulin aspart U-100 pen 0-5 Units QID (AC + HS) PRN    levothyroxine tablet 50 mcg Before breakfast    lisinopriL tablet 10 mg Daily    melatonin tablet 6 mg Nightly PRN    methocarbamoL tablet 750 mg TID PRN    metoprolol tartrate (LOPRESSOR) tablet 25 mg BID    ondansetron injection 4 mg Q8H PRN    oxybutynin 24 hr tablet 10 mg Daily    oxyCODONE immediate release tablet Tab 10 mg Q4H PRN    polyethylene glycol packet 17 g BID    senna-docusate 8.6-50 mg per tablet 1 tablet BID    sodium chloride 0.9% bolus 250 mL PRN    sodium chloride 0.9% bolus 250 mL PRN    sodium chloride 0.9% bolus 250 mL PRN    sodium chloride 0.9% bolus 250 mL PRN    sodium chloride 0.9% bolus 250 mL PRN    sodium chloride 0.9% flush 10 mL PRN    sumatriptan tablet 100 mg BID PRN    venlafaxine 24 hr capsule 150 mg Daily       Objective:     Vital Signs (Most Recent):  Temp: 98 °F (36.7 °C) (02/03/22 0923)  Pulse: 72 (02/03/22  1215)  Resp: 18 (02/03/22 0923)  BP: 129/62 (02/03/22 1215)  SpO2: (!) 94 % (02/03/22 0820)  O2 Device (Oxygen Therapy): room air (02/01/22 1159) Vital Signs (24h Range):  Temp:  [97.9 °F (36.6 °C)-99.1 °F (37.3 °C)] 98 °F (36.7 °C)  Pulse:  [62-75] 72  Resp:  [16-20] 18  SpO2:  [91 %-95 %] 94 %  BP: (111-158)/(49-77) 129/62     Weight: 116 kg (255 lb 11.7 oz) (01/31/22 1723)  Body mass index is 45.3 kg/m².  Body surface area is 2.27 meters squared.    I/O last 3 completed shifts:  In: 894 [P.O.:894]  Out: 1150 [Urine:1150]    Physical Exam  Constitutional:       General: She is not in acute distress.     Appearance: She is obese.   HENT:      Mouth/Throat:      Mouth: Mucous membranes are moist.   Eyes:      General: No scleral icterus.     Extraocular Movements: Extraocular movements intact.      Pupils: Pupils are equal, round, and reactive to light.   Cardiovascular:      Rate and Rhythm: Normal rate and regular rhythm.   Pulmonary:      Effort: Pulmonary effort is normal. No respiratory distress.   Abdominal:      General: There is no distension.      Palpations: Abdomen is soft.   Musculoskeletal:         General: Deformity (RUE TDC) present.      Right lower leg: Edema present.      Left lower leg: Edema present.   Skin:     Coloration: Skin is not jaundiced.   Neurological:      General: No focal deficit present.      Mental Status: She is alert.         Significant Labs:  All labs within the past 24 hours have been reviewed.     Significant Imaging:  Labs: Reviewed

## 2022-02-03 NOTE — ASSESSMENT & PLAN NOTE
Extensively discussed HD arrangements on 2/2/2022 with the patient when she was at skilled nursing as below:    when the patient was at SNF she was transferred into a wheelchair or stretcher to go to dialysis. The HD unit then used a christina lift to transfer her from the stretcher/wheelchair into their recliner. This worked fine per the patient. She sleeps in a lift chair at home and has not been transferred via christina to wheelchair by her sister.    Based on this,  I have ordered a hospital bed for home. I recommend having her sister come in for training with PT to see if she can independently transfer the patient from hospital bed/lift chair to wheelchair at home. Then the patient can have reliable wheelchair transport with a transport service.  If the patient's sister cannot transfer with the lift chair and no one else is available to transport her, alternative placement such as [facility] may have to be pursued.    She has missed most HD from home due to stretcher unavailability although she has transportation benefits.

## 2022-02-03 NOTE — PLAN OF CARE
Problem: Skin Injury Risk Increased  Goal: Skin Health and Integrity  Outcome: Ongoing, Progressing     Problem: Adult Inpatient Plan of Care  Goal: Plan of Care Review  Outcome: Ongoing, Progressing  Goal: Patient-Specific Goal (Individualized)  Outcome: Ongoing, Progressing  Goal: Absence of Hospital-Acquired Illness or Injury  Outcome: Ongoing, Progressing  Goal: Optimal Comfort and Wellbeing  Outcome: Ongoing, Progressing  Goal: Readiness for Transition of Care  Outcome: Ongoing, Progressing     Problem: Bariatric Environmental Safety  Goal: Safety Maintained with Care  Outcome: Ongoing, Progressing   Patient alert, remain afebrile, VS also within pt range.  Pain managed with prescribed intervention, pt verbalize moderate relief. Wound care completed per shift.  Suprapubic site cleansed and dressed.  No acute health changes, falls or injuries during shift.

## 2022-02-03 NOTE — ASSESSMENT & PLAN NOTE
-dialysis dependent ZAK secondary to septic shock 1 month prior  -TOSHIA KAYE TDC, , still makes lots of urine, [facility] dialysis unit Dr. Hurst  -murillo catheter was declogged in ED with lots of urine output  -significant LE edema and hyponatremia from water intake  -seen on HD today with UF of 2L  -please continue furosemide 160 bid

## 2022-02-03 NOTE — PROGRESS NOTES
[facility] Medicine  Telemedicine Progress Note    Patient Name: Cecile Bowen  MRN: [id number]  Patient Class: IP- Inpatient   Admission Date: 1/21/2022  Length of Stay: 8 days  Attending Physician: Dorothy Kraus MD  Primary Care Provider: Kailey Navarro MD          Subjective:     Principal Problem:Dialysis patient        HPI:  Cecile Bowen maikel 71F with ESRD (on HD), DM2, HTN, breast cancer s/p mastectomy, and recurrent UTI presents with chest heaviness. Pt recently seen in ED for similar chest heaviness. She reports a productive cough and missing some medications. She has a chronic suprapubic catheter of 5 years and is bed bound at baseline, but has a wheelchair for mobility but rarely uses it. She was started on HD 2 months ago and has issues making HD appointments due to transportation. She has missed her last two appointments. Last session was Monday prior to discharge from SNF. She had been at SNF since 12/30/21 when she was admitted after a long hospital stay after being found down at home 11/28/21. During her hospital stay, she was treated for possible STEMI, worsening CKD, metabolic acidosis and required intubation for acute respiratory failure. She was discharged to [facility] and returned home on 1/17/22 after her HD session.    In the ED, she is afebrile and HD stable. Labs positive for dirty UA, hyponatremia, hypokalemia, hypochloridemia, elevated BUN, elevated Cr, and elevated BNP. She was started on Rocephin for UTI and nephrology was consulted for HD. She was admitted to hospital medicine for further management.      Overview/Hospital Course:  Patient admitted for UTI and missed dialysis appointments due to transportation issues. Patient started on Rocephin for UTI and ID was consulted for antibiotic recommendations as patient has history of ESBL and MDRO. Pt transitioned to meropenem followed by Bactrim for 3 days for ESBL UTI. Midline consult ordered  for extended abx coverage. Nephrology was consulted for HD while inpatient. Social work was consulted for discharge planning, working on SNF placement. PT/OT consulted and recommending SNF. Wound care consulted for skin breakdown with recs.     1/28 patient with significant lower extremity edema, Cr 1.8 > 2.1, Na 125. Continue furosemide 160 mg BID per nephrology recs. Per nephrology, even though good urine O/p, patient may benefit from RRT at least 2 times a week for clearances and volume removal. Session of HD planned today. Plan for SNF vs Home with HH, anticipate discharge 1/31.         This encounter was provided through telemedicine.  Patient was transferred to the telemedicine service on:  01/26/2022   The patient location is: [id number] A admitted 1/21/2022 11:36 AM.  Present with the patient at the time of the telemed/virtual assessment: Telepresenter    Interval History/Overnight Events:     Patient without complaint; calling medicaid to see if she can get waiver program for sitters.  Most of the visit was spent discussing previous dialysis arrangments.    Review of Systems   Constitutional: Negative for fever.   Respiratory: Negative for cough and shortness of breath.    Cardiovascular: Negative for chest pain.   Neurological: Positive for weakness (Generalized).        Inpatient Medications:  Scheduled Meds:   sodium chloride 0.9%   Intravenous Once    sodium chloride 0.9%   Intravenous Once    sodium chloride 0.9%   Intravenous Once    anastrozole  1 mg Oral Daily    apixaban  2.5 mg Oral BID    atorvastatin  80 mg Oral Daily    cloNIDine  0.2 mg Oral TID    epoetin chloe-ebpx (RETACRIT) injection  50 Units/kg Intravenous Every Tues, Thurs, Sat    ergocalciferol  50,000 Units Oral Q7 Days    famotidine  20 mg Oral Daily    furosemide  160 mg Oral BID    gemfibroziL  600 mg Oral Every Mon, Wed, Fri    levothyroxine  50 mcg Oral Before breakfast    lisinopriL  10 mg Oral Daily    metoprolol  tartrate  25 mg Oral BID    oxybutynin  10 mg Oral Daily    polyethylene glycol  17 g Oral BID    senna-docusate 8.6-50 mg  1 tablet Oral BID    venlafaxine  150 mg Oral Daily     Continuous Infusions:  PRN Meds:.acetaminophen, butalbital-acetaminophen-caffeine -40 mg, dextrose 50%, dextrose 50%, glucagon (human recombinant), glucose, glucose, heparin (porcine), heparin (porcine), heparin (porcine), heparin (porcine), influenza, insulin aspart U-100, melatonin, methocarbamoL, ondansetron, oxyCODONE, sodium chloride 0.9%, sodium chloride 0.9%, sodium chloride 0.9%, sodium chloride 0.9%, sodium chloride 0.9%, sumatriptan      Objective:     Temp:  [97.7 °F (36.5 °C)-99.5 °F (37.5 °C)] 98.8 °F (37.1 °C)  Pulse:  [69-84] 74  Resp:  [16-20] 17  SpO2:  [90 %-95 %] 94 %  BP: ()/(56-77) 165/76      Intake/Output Summary (Last 24 hours) at 2/2/2022 1100  Last data filed at 2/1/2022 1201  Gross per 24 hour   Intake 600.02 ml   Output 2350 ml   Net -1749.98 ml        Body mass index is 45.3 kg/m².    Physical Exam  Vitals and nursing note reviewed.   Constitutional:       General: She is not in acute distress.     Appearance: Normal appearance. She is obese.   HENT:      Head: Normocephalic and atraumatic.   Eyes:      General: No scleral icterus.        Right eye: No discharge.         Left eye: No discharge.      Extraocular Movements: Extraocular movements intact.   Cardiovascular:      Rate and Rhythm: Normal rate.   Pulmonary:      Effort: Pulmonary effort is normal. No tachypnea or respiratory distress.   Neurological:      General: No focal deficit present.      Mental Status: She is alert and oriented to person, place, and time.      Cranial Nerves: No cranial nerve deficit.      Motor: No weakness.   Psychiatric:         Attention and Perception: Attention normal.         Mood and Affect: Mood and affect normal.         Speech: Speech normal.         Behavior: Behavior is cooperative.           Labs:  Recent Results (from the past 24 hour(s))   POCT glucose    Collection Time: 02/01/22 11:46 AM   Result Value Ref Range    POCT Glucose 159 (H) 70 - 110 mg/dL   POCT glucose    Collection Time: 02/01/22  3:52 PM   Result Value Ref Range    POCT Glucose 170 (H) 70 - 110 mg/dL   POCT glucose    Collection Time: 02/01/22  9:39 PM   Result Value Ref Range    POCT Glucose 195 (H) 70 - 110 mg/dL   Magnesium    Collection Time: 02/02/22  4:06 AM   Result Value Ref Range    Magnesium 1.5 (L) 1.6 - 2.6 mg/dL   Phosphorus    Collection Time: 02/02/22  4:06 AM   Result Value Ref Range    Phosphorus 3.6 2.7 - 4.5 mg/dL   CBC auto differential    Collection Time: 02/02/22  4:06 AM   Result Value Ref Range    WBC 6.41 3.90 - 12.70 K/uL    RBC 2.75 (L) 4.00 - 5.40 M/uL    Hemoglobin 8.0 (L) 12.0 - 16.0 g/dL    Hematocrit 25.4 (L) 37.0 - 48.5 %    MCV 92 82 - 98 fL    MCH 29.1 27.0 - 31.0 pg    MCHC 31.5 (L) 32.0 - 36.0 g/dL    RDW 15.4 (H) 11.5 - 14.5 %    Platelets 248 150 - 450 K/uL    MPV 9.7 9.2 - 12.9 fL    Immature Granulocytes 1.2 (H) 0.0 - 0.5 %    Gran # (ANC) 3.2 1.8 - 7.7 K/uL    Immature Grans (Abs) 0.08 (H) 0.00 - 0.04 K/uL    Lymph # 2.1 1.0 - 4.8 K/uL    Mono # 0.7 0.3 - 1.0 K/uL    Eos # 0.3 0.0 - 0.5 K/uL    Baso # 0.05 0.00 - 0.20 K/uL    nRBC 0 0 /100 WBC    Gran % 49.6 38.0 - 73.0 %    Lymph % 32.8 18.0 - 48.0 %    Mono % 10.9 4.0 - 15.0 %    Eosinophil % 4.7 0.0 - 8.0 %    Basophil % 0.8 0.0 - 1.9 %    Differential Method Automated    Basic metabolic panel    Collection Time: 02/02/22  4:06 AM   Result Value Ref Range    Sodium 133 (L) 136 - 145 mmol/L    Potassium 3.5 3.5 - 5.1 mmol/L    Chloride 97 95 - 110 mmol/L    CO2 29 23 - 29 mmol/L    Glucose 160 (H) 70 - 110 mg/dL    BUN 25 (H) 8 - 23 mg/dL    Creatinine 1.6 (H) 0.5 - 1.4 mg/dL    Calcium 8.1 (L) 8.7 - 10.5 mg/dL    Anion Gap 7 (L) 8 - 16 mmol/L    eGFR if African American 37.6 (A) >60 mL/min/1.73 m^2    eGFR if non  32.6  (A) >60 mL/min/1.73 m^2   POCT glucose    Collection Time: 02/02/22  7:22 AM   Result Value Ref Range    POCT Glucose 184 (H) 70 - 110 mg/dL        Lab Results   Component Value Date    [id number] Negative 01/21/2022       Recent Labs   Lab 01/31/22 0450 01/31/22 0450 02/01/22 0347 02/02/22  0406   WBC 6.90  --  7.25 6.41   LYMPH 31.0  2.1   < > 29.7  2.2 32.8  2.1   HGB 8.0*  --  8.9* 8.0*   HCT 24.8*  --  28.1* 25.4*     --  259 248    < > = values in this interval not displayed.     Recent Labs   Lab 01/31/22 0450 02/01/22 0347 02/02/22  0406   * 130* 133*   K 3.9 4.6 3.5   CL 94* 95 97   CO2 28 23 29   BUN 25* 33* 25*   CREATININE 1.7* 1.8* 1.6*   * 164* 160*   CALCIUM 7.9* 8.5* 8.1*   MG 1.4* 1.5* 1.5*   PHOS 4.6* 5.2* 3.6     No results for input(s): ALKPHOS, ALT, AST, ALBUMIN, PROT, BILITOT, INR in the last 168 hours.     No results for input(s): DDIMER, FERRITIN, CRP, LDH, BNP, TROPONINI, CPK in the last 72 hours.    Invalid input(s): PROCALCITONIN    All labs within the last 24 hours were reviewed.     Microbiology:  Microbiology Results (last 7 days)     Procedure Component Value Units Date/Time    Urine culture [[id number]]  (Abnormal)  (Susceptibility) Collected: 01/21/22 1402    Order Status: Completed Specimen: Urine Updated: 01/26/22 1210     Urine Culture, Routine KLEBSIELLA PNEUMONIAE ESBL  > 100,000 cfu/ml      Narrative:      Specimen Source->Urine            Imaging  ECG Results          EKG 12-lead (Final result)  Result time 01/22/22 10:43:30    Final result by Interface, Lab In [facility] (01/22/22 10:43:30)                 Narrative:    Test Reason : R07.9,    Vent. Rate : 090 BPM     Atrial Rate : 090 BPM     P-R Int : 150 ms          QRS Dur : 084 ms      QT Int : 372 ms       P-R-T Axes : 003 007 098 degrees     QTc Int : 455 ms    Normal sinus rhythm  Nonspecific T wave abnormality  Abnormal ECG  When compared with ECG of 20-JAN-2022 19:58,  No significant change  was found  Confirmed by Crow Bauer MD ([id number]) on 1/22/2022 10:43:13 AM    Referred By: [email or web address]   SELF           Confirmed By:Crow Bauer MD                              Results for orders placed during the hospital encounter of 11/28/21    Echo    Interpretation Summary  · The left ventricle is normal in size with normal systolic function. The estimated ejection fraction is 60%.  · Normal right ventricular size with normal right ventricular systolic function.  · Normal left ventricular diastolic function.  · Mechanically ventilated; cannot use inferior caval vein diameter to estimate central venous pressure.      X-Ray Chest AP Portable  Narrative: EXAMINATION:  XR CHEST AP PORTABLE    CLINICAL HISTORY:  chest discomfort;    TECHNIQUE:  Single frontal view of the chest was performed.    COMPARISON:  01/20/2022    FINDINGS:  Again noted is right diaphragmatic elevation with a meniscus sign, likely indicating at least a small volume right pleural effusion.  The left lung field and pleural space remains clear.  The heart size appears normal.  There is mild calcification of the aortic knob consistent with atherosclerotic stigmata.  Tunneled right hemodialysis catheter noted.  Impression: No interval detrimental change identified.    Electronically signed by: Rosaline Farah  Date:    01/21/2022  Time:    13:34      All imaging within the last 24 hours was reviewed.       Discharge Planning   FLORENTINO: 2/7/2022     Code Status: Full Code   Is the patient medically ready for discharge?: Yes    Reason for patient still in hospital (select all that apply): Patient trending condition, Treatment and Pending disposition  Discharge Plan A: Skilled Nursing Facility   Discharge Delays: (!) Post-Acute Set-up        Assessment/Plan:      * Dialysis patient  See CKD4  -patient has had trouble getting to dialysis from home and is unable to pay co-pay for skilled nursing; she is having a ramp built at her home and transportation will  need to be arranged prior to discharge    Discharge planning issues  Extensively discussed HD arrangements on 2/2/2022 with the patient when she was at skilled nursing as below:    when the patient was at SNF she was transferred into a wheelchair or stretcher to go to dialysis. The HD unit then used a christina lift to transfer her from the stretcher/wheelchair into their recliner. This worked fine per the patient. She sleeps in a lift chair at home and has not been transferred via christina to wheelchair by her sister.    Based on this,  I have ordered a hospital bed for home. I recommend having her sister come in for training with PT to see if she can independently transfer the patient from hospital bed/lift chair to wheelchair at home. Then the patient can have reliable wheelchair transport with a transport service.  If the patient's sister cannot transfer with the lift chair and no one else is available to transport her, alternative placement such as [facility] may have to be pursued.    She has missed most HD from home due to stretcher unavailability although she has transportation benefits.       Pressure injury of right buttock, stage 3  Would care consulted  Appreciate recs      Urinary tract infection due to ESBL Klebsiella  See UTI      Anemia of chronic disease  stable      Secondary hypertension  Proteinuria on labs  Added lisinopril 10mg 1/24      Debility  PT/OT consulted  SNF recommended; pt wants OSNF but has a copay for further SNF stay which she cannot provide  -- appreciate SW assistance  -- plan for home with home health once transportation issues resolved (her main reason for missing HD)  -  she has a christina lift at home for transfers    A-fib  Continue apixaban 2.5mg BID  Cont metoprolol      ZAK (acute kidney injury)  Dialysis dependent ZAK  Nephrology consulted, appreciate recs  Suprapubic murillo declogged in ED, see UTI  Low albumin and history of nephrotic syndrome  Plan:  - Trend Cr  - Strict  "I&Os  - Avoid nephrotoxic agents  - Renally adjust medications  - Prealbumin low  - P/C ratio elevated 9.33  -- History of nephrotic syndrome      Normocytic anemia  At baseline on admission  Anemia of chronic disease    CBC daily  Transfuse if Hbg <7  Checking iron studies, folate, B12, and epo with HD    S/P left mastectomy  See malignant neoplasm of left breast      Requires daily assistance for activities of daily living (ADL) and comfort needs  Cont PT/OT      Malignant neoplasm of left breast, estrogen receptor positive  History of breast cancer status post radical mastectomy on 8/1/2019 on anastrozole   Stable  Continue anastrozole 1mg daily    Cervicogenic migraine  PRNs ordered as she takes at home  -referral for f/u with her neurologist      CKD stage 4 due to type 2 diabetes mellitus  Dialysis dependent    Nephrology consulted, appreciate recs  Last HD 1/25  "S/p HD session today. Pt made 3 lit urine . Will hold off on next HD session and evaluate the need for RRT on daily basis"  Labs pending  Nephrology recs pending for dialysis needs    1/28  -plan for HD today, patient with good urine output, may require RRT twice weekly  - final nephrology recs pending         Chronic pain  PRN multimodal pain regimen      Long term prescription opiate use   with Percocet and butalbital-aceaminophen-caffeine  See chronic pain, migraines      Morbid obesity due to excess calories  Body mass index is 47.12 kg/m². Morbid obesity complicates all aspects of disease management from diagnostic modalities to treatment. Weight loss encouraged and health benefits explained to patient.     RD consulted  2000 calorie diabetic diet  BOOST    Suprapubic catheter  See neurogenic bladder      Uncontrolled type 2 diabetes mellitus with stage 4 chronic kidney disease, with long-term current use of insulin  A1c (1/4/2022) 5.4%  Diabetic diet  Continue to monitor  Sliding scale NovoLog insulin as needed for hyperglycemia  Glucose " monitoring    Major depressive disorder, recurrent episode, moderate  Not on any home medications  Continue to monitor  Consider psych consult if worsening affect        Neurogenic bladder  Continue oxybutynin 10mg daily, furosemide 160mg BID  Suprapubic catheter      Recurrent urinary tract infection  History of ESBL and MDRO with current UTI symptoms, dirty UA, and suprapubic catheter.  Plan:  - consult ID, appreciate recs  --  Plan on 3d of Meropenem (D3/3) then DC on Bactrim DS qod x 3 doses  -- Consider cont meropenem vs Bactrim if patient goes to OSNF  -- Exchange murillo cath after abx are complete  - UCx ESBL  I/D Recs as follows:  Plan: 1. Continue Meropenem x 7d total (from SP change 1/23 as urine still cloudy) - eoc 1/30 2. Rec SNF placement to complete IV abx 3. Contact isolation given prior ESBL 4. weekl CBC and CMP while on meropene 5. Discussed with ID staff 6. Needs urology close fu after dc 7. Will sign off  With end date of antibiotics 1/30/22          Hyponatremia  Chronic hyponatremia 130 baseline, 126 on admission  Secondary to fluid overload, UTI  Nephro consulted for HD  Na improved      VIJAYA (obstructive sleep apnea)  CPAP QHS      Hyperlipidemia associated with type 2 diabetes mellitus  Continue atorvastatin 80mg daily, gemfibrozil 600mg MWF      Fibromyalgia  PT/OT  Pain regimen      Hypothyroidism  Continue levothyroxine 50mcg daily        VTE Risk Mitigation (From admission, onward)         Ordered     heparin (porcine) injection 1,000 Units  As needed (PRN)         02/01/22 0803     heparin (porcine) injection 1,000 Units  As needed (PRN)         01/31/22 0715     heparin (porcine) injection 1,000 Units  As needed (PRN)         01/28/22 1038     heparin (porcine) injection 1,000 Units  As needed (PRN)         01/24/22 0828     apixaban tablet 2.5 mg  2 times daily         01/21/22 1609     IP VTE HIGH RISK PATIENT  Once         01/21/22 1609     Place sequential compression device  Until  discontinued         01/21/22 1609                I have assessed these findings virtually using a telemed platform and with assistance of the bedside nurse.        The attending portion of this evaluation, treatment, and documentation was performed per Dorothy Kraus MD via Telemedicine AudioVisual using the secure Friend[employer] software platform with 2 way audio/video. The provider was located off-site and the patient is located in the hospital. The aforementioned video software was utilized to document the relevant history and physical exam    Dorothy Kraus MD  Department of Hospital Medicine   [facility]

## 2022-02-03 NOTE — PROGRESS NOTES
Acute dialysis started to [facility].  Tolerated well.    Contact isolation precautions maintained.

## 2022-02-03 NOTE — PROGRESS NOTES
[facility]  Nephrology  Progress Note    Patient Name: Cecile Bowen  MRN: [id number]  Admission Date: 1/21/2022  Hospital Length of Stay: 9 days  Attending Provider: Liana Ruiz MD   Primary Care Physician: Kailey Navarro MD  Principal Problem:Dialysis patient    Subjective:     HPI: 71 year old female with dialysis dependent ZAK here because she felt bad and could get to her dialysis unit. She says she couldn't get to dialysis because she couldn't arrange transportation. Last HD 1/17/22.  Nephrology consulted for ESKD.      Interval History: no significant changes    Review of patient's allergies indicates:  No Known Allergies  Current Facility-Administered Medications   Medication Frequency    0.9%  NaCl infusion Once    acetaminophen tablet 650 mg Q4H PRN    anastrozole tablet 1 mg Daily    apixaban tablet 2.5 mg BID    atorvastatin tablet 80 mg Daily    butalbital-acetaminophen-caffeine -40 mg per tablet 1 tablet Daily PRN    cloNIDine tablet 0.2 mg TID    dextrose 50% injection 12.5 g PRN    dextrose 50% injection 25 g PRN    epoetin chloe-epbx injection 6,000 Units Every Tues, Thurs, Sat    ergocalciferol capsule 50,000 Units Q7 Days    famotidine tablet 20 mg Daily    furosemide tablet 160 mg BID    gemfibroziL tablet 600 mg Every Mon, Wed, Fri    glucagon (human recombinant) injection 1 mg PRN    glucose chewable tablet 16 g PRN    glucose chewable tablet 24 g PRN    heparin (porcine) injection 1,000 Units PRN    influenza (QUADRIVALENT ADJUVANTED PF) vaccine 0.5 mL vaccine x 1 dose    insulin aspart U-100 pen 0-5 Units QID (AC + HS) PRN    levothyroxine tablet 50 mcg Before breakfast    lisinopriL tablet 10 mg Daily    melatonin tablet 6 mg Nightly PRN    methocarbamoL tablet 750 mg TID PRN    metoprolol tartrate (LOPRESSOR) tablet 25 mg BID    ondansetron injection 4 mg Q8H PRN    oxybutynin 24 hr tablet 10 mg Daily    oxyCODONE immediate release  tablet Tab 10 mg Q4H PRN    polyethylene glycol packet 17 g BID    senna-docusate 8.6-50 mg per tablet 1 tablet BID    sodium chloride 0.9% bolus 250 mL PRN    sodium chloride 0.9% bolus 250 mL PRN    sodium chloride 0.9% bolus 250 mL PRN    sodium chloride 0.9% bolus 250 mL PRN    sodium chloride 0.9% bolus 250 mL PRN    sodium chloride 0.9% flush 10 mL PRN    sumatriptan tablet 100 mg BID PRN    venlafaxine 24 hr capsule 150 mg Daily       Objective:     Vital Signs (Most Recent):  Temp: 98 °F (36.7 °C) (02/03/22 0923)  Pulse: 72 (02/03/22 1215)  Resp: 18 (02/03/22 0923)  BP: 129/62 (02/03/22 1215)  SpO2: (!) 94 % (02/03/22 0820)  O2 Device (Oxygen Therapy): room air (02/01/22 1159) Vital Signs (24h Range):  Temp:  [97.9 °F (36.6 °C)-99.1 °F (37.3 °C)] 98 °F (36.7 °C)  Pulse:  [62-75] 72  Resp:  [16-20] 18  SpO2:  [91 %-95 %] 94 %  BP: (111-158)/(49-77) 129/62     Weight: 116 kg (255 lb 11.7 oz) (01/31/22 1723)  Body mass index is 45.3 kg/m².  Body surface area is 2.27 meters squared.    I/O last 3 completed shifts:  In: 894 [P.O.:894]  Out: 1150 [Urine:1150]    Physical Exam  Constitutional:       General: She is not in acute distress.     Appearance: She is obese.   HENT:      Mouth/Throat:      Mouth: Mucous membranes are moist.   Eyes:      General: No scleral icterus.     Extraocular Movements: Extraocular movements intact.      Pupils: Pupils are equal, round, and reactive to light.   Cardiovascular:      Rate and Rhythm: Normal rate and regular rhythm.   Pulmonary:      Effort: Pulmonary effort is normal. No respiratory distress.   Abdominal:      General: There is no distension.      Palpations: Abdomen is soft.   Musculoskeletal:         General: Deformity (RUE TDC) present.      Right lower leg: Edema present.      Left lower leg: Edema present.   Skin:     Coloration: Skin is not jaundiced.   Neurological:      General: No focal deficit present.      Mental Status: She is alert.          Significant Labs:  All labs within the past 24 hours have been reviewed.     Significant Imaging:  Labs: Reviewed    Assessment/Plan:     ZAK (acute kidney injury)  -dialysis dependent ZAK secondary to septic shock 1 month prior  -MIKKI, TOSHIA TDC, , still makes lots of urine, [facility] dialysis unit Dr. Hurst  -murillo catheter was declogged in ED with lots of urine output  -significant LE edema and hyponatremia from water intake  -seen on HD today with UF of 2L  -please continue furosemide 160 bid    Anemia of chronic disease  -epo TIW  -IV iron outpatient    Chronic kidney disease-mineral and bone disorder  -continue vit d    Debility  -per primary    Hyponatremia  -she is making dilute urine at 200 osm  -her hyponatremia is from excess water intake          Emmanuel Hare MD  Nephrology  [facility]

## 2022-02-04 PROBLEM — Z02.9 DISCHARGE PLANNING ISSUES: Status: RESOLVED | Noted: 2022-02-02 | Resolved: 2022-02-04

## 2022-02-04 PROBLEM — Z75.8 DISCHARGE PLANNING ISSUES: Status: RESOLVED | Noted: 2022-02-02 | Resolved: 2022-02-04

## 2022-02-04 LAB
ANION GAP SERPL CALC-SCNC: 9 MMOL/L (ref 8–16)
BACTERIA #/AREA URNS AUTO: ABNORMAL /HPF
BASOPHILS # BLD AUTO: 0.04 K/UL (ref 0–0.2)
BASOPHILS NFR BLD: 0.5 % (ref 0–1.9)
BILIRUB UR QL STRIP: NEGATIVE
BUN SERPL-MCNC: 24 MG/DL (ref 8–23)
CALCIUM SERPL-MCNC: 8.3 MG/DL (ref 8.7–10.5)
CHLORIDE SERPL-SCNC: 96 MMOL/L (ref 95–110)
CLARITY UR REFRACT.AUTO: ABNORMAL
CO2 SERPL-SCNC: 24 MMOL/L (ref 23–29)
COLOR UR AUTO: YELLOW
CREAT SERPL-MCNC: 1.3 MG/DL (ref 0.5–1.4)
DIFFERENTIAL METHOD: ABNORMAL
EOSINOPHIL # BLD AUTO: 0.3 K/UL (ref 0–0.5)
EOSINOPHIL NFR BLD: 3.7 % (ref 0–8)
ERYTHROCYTE [DISTWIDTH] IN BLOOD BY AUTOMATED COUNT: 15.2 % (ref 11.5–14.5)
EST. GFR  (AFRICAN AMERICAN): 48.4 ML/MIN/1.73 M^2
EST. GFR  (NON AFRICAN AMERICAN): 42 ML/MIN/1.73 M^2
GLUCOSE SERPL-MCNC: 181 MG/DL (ref 70–110)
GLUCOSE UR QL STRIP: ABNORMAL
HCT VFR BLD AUTO: 25.4 % (ref 37–48.5)
HGB BLD-MCNC: 8.2 G/DL (ref 12–16)
HGB UR QL STRIP: ABNORMAL
HYALINE CASTS UR QL AUTO: 0 /LPF
IMM GRANULOCYTES # BLD AUTO: 0.06 K/UL (ref 0–0.04)
IMM GRANULOCYTES NFR BLD AUTO: 0.7 % (ref 0–0.5)
KETONES UR QL STRIP: NEGATIVE
LEUKOCYTE ESTERASE UR QL STRIP: ABNORMAL
LYMPHOCYTES # BLD AUTO: 2.1 K/UL (ref 1–4.8)
LYMPHOCYTES NFR BLD: 25.8 % (ref 18–48)
MAGNESIUM SERPL-MCNC: 1.6 MG/DL (ref 1.6–2.6)
MCH RBC QN AUTO: 29.2 PG (ref 27–31)
MCHC RBC AUTO-ENTMCNC: 32.3 G/DL (ref 32–36)
MCV RBC AUTO: 90 FL (ref 82–98)
MICROSCOPIC COMMENT: ABNORMAL
MONOCYTES # BLD AUTO: 0.8 K/UL (ref 0.3–1)
MONOCYTES NFR BLD: 9.3 % (ref 4–15)
NEUTROPHILS # BLD AUTO: 5 K/UL (ref 1.8–7.7)
NEUTROPHILS NFR BLD: 60 % (ref 38–73)
NITRITE UR QL STRIP: NEGATIVE
NRBC BLD-RTO: 0 /100 WBC
PH UR STRIP: 6 [PH] (ref 5–8)
PHOSPHATE SERPL-MCNC: 3.2 MG/DL (ref 2.7–4.5)
PLATELET # BLD AUTO: 251 K/UL (ref 150–450)
PMV BLD AUTO: 9.7 FL (ref 9.2–12.9)
POCT GLUCOSE: 158 MG/DL (ref 70–110)
POCT GLUCOSE: 181 MG/DL (ref 70–110)
POCT GLUCOSE: 218 MG/DL (ref 70–110)
POCT GLUCOSE: 225 MG/DL (ref 70–110)
POTASSIUM SERPL-SCNC: 3.5 MMOL/L (ref 3.5–5.1)
PROT UR QL STRIP: ABNORMAL
RBC # BLD AUTO: 2.81 M/UL (ref 4–5.4)
RBC #/AREA URNS AUTO: >100 /HPF (ref 0–4)
SODIUM SERPL-SCNC: 129 MMOL/L (ref 136–145)
SP GR UR STRIP: 1.01 (ref 1–1.03)
URN SPEC COLLECT METH UR: ABNORMAL
WBC # BLD AUTO: 8.28 K/UL (ref 3.9–12.7)
WBC #/AREA URNS AUTO: >100 /HPF (ref 0–5)
WBC CLUMPS UR QL AUTO: ABNORMAL

## 2022-02-04 PROCEDURE — 25000003 PHARM REV CODE 250: Mod: HCNC | Performed by: NURSE PRACTITIONER

## 2022-02-04 PROCEDURE — 25000003 PHARM REV CODE 250: Mod: HCNC

## 2022-02-04 PROCEDURE — 85025 COMPLETE CBC W/AUTO DIFF WBC: CPT | Mod: HCNC

## 2022-02-04 PROCEDURE — 36415 COLL VENOUS BLD VENIPUNCTURE: CPT | Mod: HCNC

## 2022-02-04 PROCEDURE — 80048 BASIC METABOLIC PNL TOTAL CA: CPT | Mod: HCNC

## 2022-02-04 PROCEDURE — 84100 ASSAY OF PHOSPHORUS: CPT | Mod: HCNC

## 2022-02-04 PROCEDURE — 11000001 HC ACUTE MED/SURG PRIVATE ROOM: Mod: HCNC

## 2022-02-04 PROCEDURE — 27000207 HC ISOLATION: Mod: HCNC

## 2022-02-04 PROCEDURE — 97535 SELF CARE MNGMENT TRAINING: CPT | Mod: HCNC,CO

## 2022-02-04 PROCEDURE — 83735 ASSAY OF MAGNESIUM: CPT | Mod: HCNC

## 2022-02-04 PROCEDURE — 25000003 PHARM REV CODE 250: Mod: HCNC | Performed by: STUDENT IN AN ORGANIZED HEALTH CARE EDUCATION/TRAINING PROGRAM

## 2022-02-04 PROCEDURE — 97530 THERAPEUTIC ACTIVITIES: CPT | Mod: HCNC,CO

## 2022-02-04 RX ORDER — VENLAFAXINE HYDROCHLORIDE 150 MG/1
150 CAPSULE, EXTENDED RELEASE ORAL DAILY
Qty: 30 CAPSULE | Refills: 11 | Status: SHIPPED | OUTPATIENT
Start: 2022-02-05 | End: 2022-04-25

## 2022-02-04 RX ORDER — SODIUM CHLORIDE 9 MG/ML
INJECTION, SOLUTION INTRAVENOUS ONCE
Status: DISCONTINUED | OUTPATIENT
Start: 2022-02-05 | End: 2022-02-05 | Stop reason: HOSPADM

## 2022-02-04 RX ORDER — HEPARIN SODIUM 1000 [USP'U]/ML
1000 INJECTION, SOLUTION INTRAVENOUS; SUBCUTANEOUS
Status: DISCONTINUED | OUTPATIENT
Start: 2022-02-05 | End: 2022-02-05 | Stop reason: HOSPADM

## 2022-02-04 RX ADMIN — METOPROLOL TARTRATE 25 MG: 25 TABLET, FILM COATED ORAL at 09:02

## 2022-02-04 RX ADMIN — APIXABAN 2.5 MG: 2.5 TABLET, FILM COATED ORAL at 09:02

## 2022-02-04 RX ADMIN — FUROSEMIDE 160 MG: 80 TABLET ORAL at 08:02

## 2022-02-04 RX ADMIN — CLONIDINE HYDROCHLORIDE 0.2 MG: 0.2 TABLET ORAL at 09:02

## 2022-02-04 RX ADMIN — FAMOTIDINE 20 MG: 20 TABLET ORAL at 09:02

## 2022-02-04 RX ADMIN — OXYCODONE HYDROCHLORIDE 10 MG: 10 TABLET ORAL at 09:02

## 2022-02-04 RX ADMIN — SENNOSIDES AND DOCUSATE SODIUM 1 TABLET: 50; 8.6 TABLET ORAL at 09:02

## 2022-02-04 RX ADMIN — SUMATRIPTAN SUCCINATE 100 MG: 50 TABLET ORAL at 07:02

## 2022-02-04 RX ADMIN — POLYETHYLENE GLYCOL 3350 17 G: 17 POWDER, FOR SOLUTION ORAL at 09:02

## 2022-02-04 RX ADMIN — Medication 6 MG: at 10:02

## 2022-02-04 RX ADMIN — LISINOPRIL 10 MG: 10 TABLET ORAL at 09:02

## 2022-02-04 RX ADMIN — METOPROLOL TARTRATE 25 MG: 25 TABLET, FILM COATED ORAL at 08:02

## 2022-02-04 RX ADMIN — OXYCODONE HYDROCHLORIDE 10 MG: 10 TABLET ORAL at 04:02

## 2022-02-04 RX ADMIN — METHOCARBAMOL 750 MG: 750 TABLET ORAL at 09:02

## 2022-02-04 RX ADMIN — METHOCARBAMOL 750 MG: 750 TABLET ORAL at 04:02

## 2022-02-04 RX ADMIN — GEMFIBROZIL 600 MG: 600 TABLET ORAL at 04:02

## 2022-02-04 RX ADMIN — APIXABAN 2.5 MG: 2.5 TABLET, FILM COATED ORAL at 08:02

## 2022-02-04 RX ADMIN — OXYCODONE HYDROCHLORIDE 10 MG: 10 TABLET ORAL at 05:02

## 2022-02-04 RX ADMIN — LEVOTHYROXINE SODIUM 50 MCG: 50 TABLET ORAL at 05:02

## 2022-02-04 RX ADMIN — VENLAFAXINE HYDROCHLORIDE 150 MG: 75 CAPSULE, EXTENDED RELEASE ORAL at 09:02

## 2022-02-04 RX ADMIN — INSULIN ASPART 2 UNITS: 100 INJECTION, SOLUTION INTRAVENOUS; SUBCUTANEOUS at 07:02

## 2022-02-04 RX ADMIN — ATORVASTATIN CALCIUM 80 MG: 20 TABLET, FILM COATED ORAL at 09:02

## 2022-02-04 RX ADMIN — FUROSEMIDE 160 MG: 80 TABLET ORAL at 09:02

## 2022-02-04 RX ADMIN — OXYBUTYNIN CHLORIDE 10 MG: 10 TABLET, FILM COATED, EXTENDED RELEASE ORAL at 09:02

## 2022-02-04 RX ADMIN — SENNOSIDES AND DOCUSATE SODIUM 1 TABLET: 50; 8.6 TABLET ORAL at 08:02

## 2022-02-04 RX ADMIN — INSULIN ASPART 2 UNITS: 100 INJECTION, SOLUTION INTRAVENOUS; SUBCUTANEOUS at 12:02

## 2022-02-04 RX ADMIN — CLONIDINE HYDROCHLORIDE 0.2 MG: 0.2 TABLET ORAL at 04:02

## 2022-02-04 RX ADMIN — ANASTROZOLE 1 MG: 1 TABLET, COATED ORAL at 09:02

## 2022-02-04 NOTE — PHYSICIAN QUERY
PT Name: Cecile Bowen  MR #: [id number]     DOCUMENTATION CLARIFICATION     CDS/:Cain HAN, RN-BC  Contact information: [email or web address]  This form is a permanent document in the medical record.     Query Date: February 4, 2022    By submitting this query, we are merely seeking further clarification of documentation to reflect the severity of illness of your patient. Please utilize your independent clinical judgment when addressing the question(s) below.    The Medical Record contains the following:   Indicators   Supporting Clinical Findings Location in Medical Record   X Hypertension or hypertensive documented Secondary hypertension-Proteinuria on labs   Hospital Medicine, 1/24   X Acute/Chronic condition(s) Acute cystitis, chronic indwelling cath, ESRD on HD, hyponatremia, anemia due to ESRD, physical debility, A fib, ZAK, breast cancer, T2DM, morbid obesity, migraine, HLD, fibromyalgia, hypothyroidism Hospital Medicine, 1/24   X Vital signs Temp:  [98.1 °F (36.7 °C)-99 °F (37.2 °C)] 99 °F (37.2 °C)  Pulse:  [70-87] 82  Resp:  [17-18] 18  SpO2:  [92 %-96 %] 92 %  BP: (126-174)/(65-74) 134/66 Hospital Medicine, 1/24   X Lab findings H/H 8.9/27.7, H/H 7.7/24.5  Sodium 125-134  Creatinine 1.3-2.2  GFR 22.2-42.0  Urine protein 2+ 1/21-1/22 Labs  1/21-2/4 1/21-2/4 1/21-2/4  2/3   X Radiology findings Right-sided subclavian access catheter tip in the right atrium.  Cardiomegaly with new small right-sided pleural effusion.  Stable mild pulmonary vascular congestion.    CXR 1/20   X Treatment/Medication Added lisinopril 10mg 1/24 Hospital Medicine, 1/24    Other       The clinical guidelines noted are only a system guideline. It does not replace the provider's clinical judgment.  Provider, please further specify the Hypertension diagnosis that correspond(s) to the above indicators:  [   ] Secondary hypertension, due to (please specify): ____________      [ x ] Essential (primary) hypertension   [   ] Other  cardiovascular condition (please specify): __________   [   ]  Clinically Undetermined     Please document in your progress notes daily for the duration of treatment until resolved, and include in your discharge summary.    References:    WALE Newton MD, PhD, & JAUN Armenta MD, FACP, FCCP, FCCM, FRSM. (2020, June 25). Evaluation and treatment of hypertensive emergencies in adults (LALITO Stanton MD, WALE Isaacs MD, & JAUN Mejia MD, MSc, Eds.). Retrieved October 22, 2020, from https://www.[employer]/contents/evaluation-and-treatment-gq-zghaggbjueax-ibjiedumeqf-in-adults?search=hypertensive%20emergency&source=search_result&selectedTitle=1~150&usage_type=default&display_rank=1    JAUN Armenta MD, FACP, FCCP, FCCM, FR, & WALE Newton MD, PhD. (2020, October 14). Management of severe asymptomatic hypertension (hypertensive urgencies) in adults (LALITO Stanton MD, WALE Isaacs MD, & JAUN Mejia MD, MSc, Eds.). Retrieved October 22, 2020, from https://www.[employer]/contents/management-of-severe-asymptomatic-hypertension-hypertensive-urgencies-in-adults?search=hypertensive%20urgency&source=search_result&selectedTitle=1~41&usage_type=default&display_rank=1    Form No. [id number]

## 2022-02-04 NOTE — PLAN OF CARE
02/04/22 0829   Post-Acute Status   Post-Acute Authorization Home Health   Home Health Status Referrals Sent   Discharge Delays None known at this time   Discharge Plan   Discharge Plan A Home Health   Discharge Plan B Home Health     Forwarded referrals to [facility], [facility], [facility] , and [facility]3].    Alena Wright, [facility] [place]  [phone]

## 2022-02-04 NOTE — PLAN OF CARE
02/04/22 1136   Post-Acute Status   Post-Acute Authorization Home Health   Home Health Status Set-up Complete/Auth obtained   Discharge Delays None known at this time   Discharge Plan   Discharge Plan A Home Health   Discharge Plan B Home Health     Spoke with Yenifer at [facility]. They will see patient on Tuesday, 02/08/2022. Working with HME on hospital bed delivery. Patient should DC tomorrow or Sunday via ambulance transport. Will continue to follow.    Alena Wright, [facility] [place]  [phone]

## 2022-02-04 NOTE — ASSESSMENT & PLAN NOTE
A1c (1/4/2022) 5.4%  Diabetic diet  Continue to monitor  NovoLog insulin as needed for hyperglycemia  Glucose monitoring

## 2022-02-04 NOTE — PT/OT/SLP PROGRESS
"Occupational Therapy   Treatment    Name: Cecile Bowen  MRN: [id number]  Admitting Diagnosis:  Dialysis patient      Pre-op Diagnosis: * No surgery found *    Recommendations:     Discharge Recommendations: nursing facility, skilled  Discharge Equipment Recommendations:  hospital bed  Barriers to discharge:   (increased level of (A) required)    Assessment:     Cecile Bowen is a 71 y.o. female with a medical diagnosis of Dialysis patient.  She presents with the following performance deficits affecting function are weakness,impaired endurance,impaired self care skills,impaired functional mobilty,gait instability,impaired balance,pain,decreased safety awareness,decreased upper extremity function,decreased lower extremity function,decreased ROM. Patient agreeable to OT session and tolerated well. Patient would benefit from continued OT services to address deficits and progress towards goals. Continue OT POC.    Rehab Prognosis:  Good; patient would benefit from acute skilled OT services to address these deficits and reach maximum level of function.       Plan:     Patient to be seen 3 x/week to address the above listed problems via self-care/home management,therapeutic activities,therapeutic exercises  · Plan of Care Expires: 02/22/22  · Plan of Care Reviewed with: patient    Subjective   "I might need to be cleaned up first."    Pain/Comfort:  · Pain Rating 1: 7/10  · Location - Side 1: Bilateral  · Location - Orientation 1: generalized  · Location 1: back  · Pain Addressed 1: Reposition,Distraction  · Pain Rating Post-Intervention 1: 7/10    Objective:     Communicated with: RN prior to session.  Patient found HOB elevated with central line (suprapubic catheter) upon OT entry to room.    General Precautions: Standard, fall   Orthopedic Precautions:N/A   Braces: N/A  Respiratory Status: Room air     Occupational Performance:     Bed Mobility:    · Patient completed Rolling/Turning to Left with  minimum assistance " and with side rail  · Patient completed Rolling/Turning to Right with minimum assistance and with side rail  · Patient completed Scooting/Bridging with stand by assistance  · Patient completed Supine to Sit with contact guard assistance, with side rail and HOB elevated and increased time for mobility with cues for sequenicng  · Patient completed Sit to Supine with moderate assistance and HOB flat with BLE management     Functional Mobility/Transfers:  · Did not assess this day    Activities of Daily Living:  · Grooming: SET-UP for completing g/h tasks seated EOB  · Toileting: maximal assistance for posterior pericare and gown management (patient received soiled and RN present to (A)). Education provided in managing pericare at home and maintain skin integrity. Patient simulated pericare from EOB with cues on hand placement and weight shifting. Patient demos good BUE ROM to complete. Patient receptive towards education and verbalized understanding.       [facility] 6 Click ADL: 17    Treatment & Education:  Instruction and facilitation in bed mobility including sequencing  ADL re-training  Extensive education provided for maintaining skin integrity and proper hygiene techniques. Patient verbalized understanding    Patient left HOB elevated with all lines intact and call button in reachEducation:      GOALS:   Multidisciplinary Problems     Occupational Therapy Goals        Problem: Occupational Therapy Goal    Goal Priority Disciplines Outcome Interventions   Occupational Therapy Goal     OT, PT/OT Ongoing, Progressing    Description: Goals to be met by: 02-03-22     Patient will increase functional independence with ADLs by performing:    UE Dressing with Set-up Assistance.  Grooming while seated with Set-up Assistance.  Toileting from bedside commode with Moderate Assistance for hygiene and clothing management using drop arm commode and slide board  Sitting at edge of bed x15 minutes with Modified [place].  Supine  to sit with Minimal Assistance.  Slide board  transfers with Minimal Assistance.  Pt. To be I with HEP to BUE to improve level of endurance (RUE with RTC injury)  Pt. To perform sit to stand from EOB with Mod A                     Time Tracking:     OT Date of Treatment: 02/04/22  OT Start Time: 1427  OT Stop Time: 1505  OT Total Time (min): 38 min    Billable Minutes:Self Care/Home Management 26  Therapeutic Activity 12    OT/SUZI: SUZI     SUZI Visit Number: 2    2/4/2022

## 2022-02-04 NOTE — ASSESSMENT & PLAN NOTE
History of breast cancer status post radical mastectomy on 8/1/2019 on anastrozole   Stable  Continue anastrozole 1 mg daily

## 2022-02-04 NOTE — ASSESSMENT & PLAN NOTE
Dialysis dependent ZAK  Nephrology consulted, appreciate recs  Suprapubic Cath declogged in ED, see UTI  Low albumin and history of nephrotic syndrome  - Strict I&Os  - Avoid nephrotoxic agents  - Renally adjust medications  - Prealbumin low  - P/C ratio elevated 9.33  -- History of nephrotic syndrome

## 2022-02-04 NOTE — ASSESSMENT & PLAN NOTE
History of ESBL and MDRO with current UTI symptoms, dirty UA, and suprapubic catheter.  - consulted ID, appreciate recs  --  Plan on 3d of Meropenem (D3/3) then Bactrim DS qod x 3 doses  -- Exchange SP cath after abx are complete  - UCx ESBL  I/D Recs as follows:  Plan: 1. Continue Meropenem x 7d total (from SP change 1/23 as urine still cloudy) - eoc 1/30 2. Rec SNF placement to complete IV abx 3. Contact isolation given prior ESBL 4. weekly CBC and CMP while on meropene 5. Discussed with ID staff 6. Needs urology close fu after dc 7. Will sign off  With end date of antibiotics 1/30/22

## 2022-02-04 NOTE — SUBJECTIVE & OBJECTIVE
Telemedicine  This service was provided by Virtual Visit.    Patient was transferred to [facility] on:  1/26/2022     Chief Complaint   Patient presents with    dialysis     PT has not had dialysis since Monday and missed dialysis Wed.      The patient location is: 608[address]   Admitted 1/21/2022 11:36 AM  Present with the patient at the time of the telemed/virtual assessment: Telepresenter    Interval History / Events Overnight:   The patient is able to provide adequate history. Additional history was obtained from past medical records. No significant events reported by Nursing. Patient with no urinary symptoms. BP noted to be elevated.  Patient complains of nothing specific. Symptoms have improved since yesterday. Associated symptoms include: fatigue. Symptoms are decreasing in severity.     Lab test(s) reviewed: H&H stable    Review of Systems   Constitutional: Negative for fever.   Respiratory: Negative for shortness of breath.      Objective:     Vital Signs (Most Recent):  Temp: 98.4 °F (36.9 °C) (02/04/22 0745)  Pulse: 72 (02/04/22 0745)  Resp: 17 (02/04/22 0912)  BP: 129/64 (02/04/22 0745)  SpO2: 98 % (02/04/22 0745) Vital Signs (24h Range):  Temp:  [97.6 °F (36.4 °C)-98.5 °F (36.9 °C)] 98.4 °F (36.9 °C)  Pulse:  [70-85] 72  Resp:  [16-19] 17  SpO2:  [93 %-98 %] 98 %  BP: (111-155)/(49-70) 129/64     Weight: 116 kg (255 lb 11.7 oz)  Body mass index is 45.3 kg/m².    Intake/Output Summary (Last 24 hours) at 2/4/2022 1134  Last data filed at 2/4/2022 0437  Gross per 24 hour   Intake 550.74 ml   Output 2900 ml   Net -2349.26 ml      Physical Exam  Constitutional:       General: She is not in acute distress.     Appearance: Normal appearance. She is not diaphoretic.   Eyes:      General: Lids are normal. No scleral icterus.        Right eye: No discharge.         Left eye: No discharge.      Conjunctiva/sclera: Conjunctivae normal.   Cardiovascular:      Rate and Rhythm: Normal rate.   Pulmonary:       Effort: Pulmonary effort is normal. No tachypnea, accessory muscle usage or respiratory distress.   Abdominal:      General: There is no distension.   Skin:     Coloration: Skin is not cyanotic.   Neurological:      Mental Status: She is alert. She is not disoriented.   Psychiatric:         Attention and Perception: Attention normal.         Mood and Affect: Affect normal.         Behavior: Behavior is cooperative.         Significant Labs:   Recent Labs   Lab 02/03/22  2316   COLORU Yellow   SPECGRAV 1.010   PHUR 6.0   PROTEINUA 2+*   BACTERIA Many*   Urine dipstick shows positive for WBC's and positive for RBC's.    Micro exam: >100K WBC's per HPF and >100K RBC's per HPF.    Recent Labs   Lab 09/05/21  2139 12/21/21  0803 01/03/22  0000   HGBA1C 6.6* 5.3 5.4   :   Recent Labs   Lab 02/03/22  1604 02/03/22  2102 02/04/22  0712   POCTGLUCOSE 220* 281* 181*     Recent Labs   Lab 02/02/22  0406 02/03/22  0401 02/04/22  0257   WBC 6.41 6.72 8.28   HGB 8.0* 8.3* 8.2*   HCT 25.4* 25.8* 25.4*    246 251     Recent Labs   Lab 02/02/22  0406 02/02/22  0406 02/03/22  0401 02/04/22  0257   GRAN 49.6  3.2   < > 53.5  3.6 60.0  5.0   LYMPH 32.8  2.1   < > 30.8  2.1 25.8  2.1   MONO 10.9  0.7   < > 8.9  0.6 9.3  0.8   EOS 0.3  --  0.4 0.3    < > = values in this interval not displayed.     Recent Labs   Lab 02/02/22  0406 02/03/22  0401 02/04/22  0257   * 132* 129*   K 3.5 3.7 3.5   CL 97 95 96   CO2 29 29 24   BUN 25* 30* 24*   CREATININE 1.6* 1.8* 1.3   * 173* 181*   CALCIUM 8.1* 8.8 8.3*   MG 1.5* 1.5* 1.6   PHOS 3.6 4.6* 3.2     Recent Labs   Lab 09/28/21  1749 09/28/21 2112 10/01/21  0351 11/1952 11/29/21  0413 12/06/21  0758 01/03/22  0000 01/23/22  0332   PROCAL  --   --   --   --   --  0.69*  --   --    LACTATE 1.1 1.1  --  0.8  --   --   --   --    FERRITIN  --   --    < >  --  757*  --  353* 812*    < > = values in this interval not displayed.     Results for orders placed or  performed in visit on 01/12/22   Vitamin D   Result Value Ref Range    Vit D, 25-Hydroxy 61.7 30.0 - 100.0 ng/mL     SARS-CoV2 (COVID-19) Qualitative PCR (no units)   Date Value   12/28/2021 Not Detected   12/21/2021 Not Detected   09/05/2021 Not Detected     POC Rapid COVID (no units)   Date Value   01/21/2022 Negative   01/20/2022 Negative   11/28/2021 Negative   09/28/2021 Negative   09/05/2021 Negative   05/14/2021 Negative       ECG Results          EKG 12-lead (Final result)  Result time 01/22/22 10:43:30    Final result by Interface, Lab In [facility] (01/22/22 10:43:30)                 Narrative:    Test Reason : R07.9,    Vent. Rate : 090 BPM     Atrial Rate : 090 BPM     P-R Int : 150 ms          QRS Dur : 084 ms      QT Int : 372 ms       P-R-T Axes : 003 007 098 degrees     QTc Int : 455 ms    Normal sinus rhythm  Nonspecific T wave abnormality  Abnormal ECG  When compared with ECG of 20-JAN-2022 19:58,  No significant change was found  Confirmed by Crow Bauer MD ([id number]) on 1/22/2022 10:43:13 AM    Referred By: [email or web address]   SELF           Confirmed By:Crow Bauer MD                              Results for orders placed during the hospital encounter of 11/28/21    Echo    Interpretation Summary  · The left ventricle is normal in size with normal systolic function. The estimated ejection fraction is 60%.  · Normal right ventricular size with normal right ventricular systolic function.  · Normal left ventricular diastolic function.  · Mechanically ventilated; cannot use inferior caval vein diameter to estimate central venous pressure.      X-Ray Chest AP Portable  Narrative: EXAMINATION:  XR CHEST AP PORTABLE    CLINICAL HISTORY:  chest discomfort;    TECHNIQUE:  Single frontal view of the chest was performed.    COMPARISON:  01/20/2022    FINDINGS:  Again noted is right diaphragmatic elevation with a meniscus sign, likely indicating at least a small volume right pleural effusion.  The left lung field  and pleural space remains clear.  The heart size appears normal.  There is mild calcification of the aortic knob consistent with atherosclerotic stigmata.  Tunneled right hemodialysis catheter noted.  Impression: No interval detrimental change identified.    Electronically signed by: Rosaline Farah  Date:    01/21/2022  Time:    13:34      Labs and Imaging within the last 24 hours listed above were reviewed.       Diet: Diet diabetic [facility]; 2000 Calorie; Renal  GI Prophylaxis: Continued, chronic acid suppression therapy as OP and Indicated due to multiple minor risk factors  Significant LDAs:   IV Access Type: Dialysis Access  Urinary Catheter Indication if present: Patient Does Not Have Urinary Catheter  Other Lines/Tubes/Drains:    HIGH RISK CONDITION(S):   Patient has an abrupt change in neurologic status: Weakness     Goals of Care:    Previous admission:  1/20/22  Likely prognosis:  Fair  Code Status: Full Code  Comfort Only: No  Hospice: No  Goals at discharge: remain at home, with physician follow-up    Discharge Planning   FLORENTINO: 2/5/2022     Code Status: Full Code   Is the patient medically ready for discharge?: Yes    Reason for patient still in hospital (select all that apply): Patient trending condition and Pending disposition  Discharge Plan A: Home Health   Discharge Delays: None known at this time

## 2022-02-04 NOTE — PLAN OF CARE
02/04/22 1510   Discharge Reassessment   Assessment Type Discharge Planning Reassessment   Did the patient's condition or plan change since previous assessment? No   Discharge Plan discussed with: Patient   Communicated FLORENTINO with patient/caregiver Yes   Discharge Plan A Home Health   Discharge Plan B Home Health   DME Needed Upon Discharge  hospital bed   Discharge Barriers Identified None   Post-Acute Status   Home Health Status Set-up Complete/Auth obtained   Hospital Resources/Appts/Education Provided Appointments scheduled and added to AVS   Discharge Delays None known at this time     Patient to DC tomorrow home with HH. Patient to have ambulance transport home as she is bed bound.    Alena Wright, [facility] [place]  [phone]

## 2022-02-04 NOTE — ASSESSMENT & PLAN NOTE
Body mass index is 45.3 kg/m². Morbid obesity complicates all aspects of disease management from diagnostic modalities to treatment. Weight loss encouraged and health benefits explained to patient.   RD consulted  2000 calorie diabetic diet  BOOST

## 2022-02-04 NOTE — ASSESSMENT & PLAN NOTE
PT/OT consulted  SNF recommended; pt wants OSNF but has a copay for further SNF stay which she cannot provide  -- appreciate SW assistance  -- plan for home with home health once transportation issues resolved (her main reason for missing HD)  -  she has a christina lift at home for transfers if family can assist

## 2022-02-04 NOTE — PLAN OF CARE
Met with patient at bedside. She reports that she has spoken with [employer] and has made ambulance transport arrangements to come every Monday, Wednesday, and Friday to be here for 6:30AM. I am awaiting responses from  agencies regarding those services.Will continue to follow.    Alena Wright, [facility] [place]  [phone]

## 2022-02-04 NOTE — ASSESSMENT & PLAN NOTE
"Dialysis dependent  Nephrology consulted, appreciate recs  Last HD 1/25 - "S/p HD session today. Pt made 3 L urine . Will hold off on next HD session and evaluate the need for RRT on daily basis"  Labs pending  Nephrology recs pending for dialysis needs  1/28 - plan for HD, patient with good urine output, may require RRT twice weekly  - final nephrology recs: Dialysis dependent ZAK. Continue HD  "

## 2022-02-04 NOTE — PLAN OF CARE
Met patient and sister at bedside late afternoon on 02/03/2022. The plan is for patient to go home with home health and resume ambulance transport services to and from [facility] through [facility]. Left VM for Miranda Harvey @ [phone] ext [phone] with [facility] at 7:50 AM this morning to inquire how to go about resuming those services.     Alena Wright, [facility] [place]  [phone]

## 2022-02-04 NOTE — PROGRESS NOTES
[facility] Medicine  Telemedicine Progress Note    Patient Name: Ceicle Bowen  MRN: [id number]  Patient Class: IP- Inpatient   Admission Date: 1/21/2022  Length of Stay: 9 days  Attending Physician: Liana Ruiz MD  Primary Care Provider: Kailey Navarro MD      Subjective:     Principal Problem:Dialysis patient    HPI:  Cecile Bowen maikel 71F with ESRD (on HD), DM2, HTN, breast cancer s/p mastectomy, and recurrent UTI presents with chest heaviness. Pt recently seen in ED for similar chest heaviness. She reports a productive cough and missing some medications. She has a chronic suprapubic catheter of 5 years and is bed bound at baseline, but has a wheelchair for mobility but rarely uses it. She was started on HD 2 months ago and has issues making HD appointments due to transportation. She has missed her last two appointments. Last session was Monday prior to discharge from SNF. She had been at SNF since 12/30/21 when she was admitted after a long hospital stay after being found down at home 11/28/21. During her hospital stay, she was treated for possible STEMI, worsening CKD, metabolic acidosis and required intubation for acute respiratory failure. She was discharged to [facility] and returned home on 1/17/22 after her HD session.    In the ED, she is afebrile and HD stable. Labs positive for dirty UA, hyponatremia, hypokalemia, hypochloridemia, elevated BUN, elevated Cr, and elevated BNP. She was started on Rocephin for UTI and nephrology was consulted for HD. She was admitted to hospital medicine for further management.      Overview/Hospital Course:  Patient admitted for UTI and missed dialysis appointments due to transportation issues. Patient started on Rocephin for UTI and ID was consulted for antibiotic recommendations as patient has history of ESBL and MDRO. Pt transitioned to meropenem followed by Bactrim for 3 days for ESBL UTI. Midline consult ordered for extended  "abx coverage. Nephrology was consulted for HD while inpatient. Social work was consulted for discharge planning, working on SNF placement. PT/OT consulted and recommending SNF. Wound care consulted for skin breakdown with recs.     1/28 patient with significant lower extremity edema, Cr 1.8 > 2.1, Na 125. Continue furosemide 160 mg BID per nephrology recs. Per nephrology, even though good urine O/p, patient may benefit from RRT at least 2 times a week for clearances and volume removal. Session of HD planned today. Plan for SNF vs Home with HH, anticipate discharge 1/31.       Telemedicine  This service was provided by Virtual Visit.    Patient was transferred to [facility] on:  1/26/2022     Chief Complaint   Patient presents with    dialysis     PT has not had dialysis since Monday and missed dialysis Wed.      The patient location is: 8[facility] A   Admitted 1/21/2022 11:36 AM  Present with the patient at the time of the telemed/virtual assessment: Telepresenter    Interval History / Events Overnight:   The patient is able to provide adequate history. Additional history was obtained from past medical records. No significant events reported by Nursing. Nurse reports "pink" urine this PM.  Patient complains of typical chronic HA. Symptoms have worsened since yesterday. Associated symptoms include: fatigue. Symptoms are increasing in severity.     Lab test(s) reviewed: H&H stable    Review of Systems   Constitutional: Negative for fever.   Respiratory: Negative for shortness of breath.      Objective:     Vital Signs (Most Recent):  Temp: 98 °F (36.7 °C) (02/03/22 0923)  Pulse: 71 (02/03/22 1130)  Resp: 18 (02/03/22 0923)  BP: (!) 118/52 (02/03/22 1130)  SpO2: (!) 94 % (02/03/22 0820) Vital Signs (24h Range):  Temp:  [97.9 °F (36.6 °C)-99.1 °F (37.3 °C)] 98 °F (36.7 °C)  Pulse:  [62-75] 71  Resp:  [16-20] 18  SpO2:  [91 %-95 %] 94 %  BP: (118-158)/(52-77) 118/52     Weight: 116 kg (255 lb 11.7 oz)  Body " mass index is 45.3 kg/m².    Intake/Output Summary (Last 24 hours) at 2/3/2022 1142  Last data filed at 2/3/2022 0700  Gross per 24 hour   Intake 654 ml   Output 850 ml   Net -196 ml      Physical Exam  Constitutional:       General: She is not in acute distress.     Appearance: Normal appearance. She is not diaphoretic.   Eyes:      General: Lids are normal. No scleral icterus.        Right eye: No discharge.         Left eye: No discharge.      Conjunctiva/sclera: Conjunctivae normal.   Cardiovascular:      Rate and Rhythm: Normal rate.   Pulmonary:      Effort: Pulmonary effort is normal. No tachypnea, accessory muscle usage or respiratory distress.   Abdominal:      General: There is no distension.   Skin:     Coloration: Skin is not cyanotic.   Neurological:      Mental Status: She is alert. She is not disoriented.   Psychiatric:         Attention and Perception: Attention normal.         Mood and Affect: Affect normal.         Behavior: Behavior is cooperative.         Significant Labs:     Recent Labs   Lab 09/05/21  2139 12/21/21  0803 01/03/22  0000   HGBA1C 6.6* 5.3 5.4   :   Recent Labs   Lab 02/03/22  0724 02/03/22  1214 02/03/22  1604   POCTGLUCOSE 195* 147* 220*     Recent Labs   Lab 02/01/22  0347 02/02/22  0406 02/03/22  0401   WBC 7.25 6.41 6.72   HGB 8.9* 8.0* 8.3*   HCT 28.1* 25.4* 25.8*    248 246     Recent Labs   Lab 02/01/22  0347 02/01/22  0347 02/02/22  0406 02/03/22  0401   GRAN 54.0  3.9   < > 49.6  3.2 53.5  3.6   LYMPH 29.7  2.2   < > 32.8  2.1 30.8  2.1   MONO 10.6  0.8   < > 10.9  0.7 8.9  0.6   EOS 0.3  --  0.3 0.4    < > = values in this interval not displayed.     Recent Labs   Lab 02/01/22  0347 02/02/22  0406 02/03/22  0401   * 133* 132*   K 4.6 3.5 3.7   CL 95 97 95   CO2 23 29 29   BUN 33* 25* 30*   CREATININE 1.8* 1.6* 1.8*   * 160* 173*   CALCIUM 8.5* 8.1* 8.8   MG 1.5* 1.5* 1.5*   PHOS 5.2* 3.6 4.6*     Recent Labs   Lab 09/28/21  4245  09/28/21  2112 10/01/21  0351 11/28/21  1952/21  0413 12/06/21  0758 01/03/22  0000 01/23/22  0332   PROCAL  --   --   --   --   --  0.69*  --   --    LACTATE 1.1 1.1  --  0.8  --   --   --   --    FERRITIN  --   --    < >  --  757*  --  353* 812*    < > = values in this interval not displayed.     Results for orders placed or performed in visit on 01/12/22   Vitamin D   Result Value Ref Range    Vit D, 25-Hydroxy 61.7 30.0 - 100.0 ng/mL     SARS-CoV2 (COVID-19) Qualitative PCR (no units)   Date Value   12/28/2021 Not Detected   12/21/2021 Not Detected   09/05/2021 Not Detected     POC Rapid COVID (no units)   Date Value   01/21/2022 Negative   01/20/2022 Negative   11/28/2021 Negative   09/28/2021 Negative   09/05/2021 Negative   05/14/2021 Negative       ECG Results          EKG 12-lead (Final result)  Result time 01/22/22 10:43:30    Final result by Interface, Lab In [facility] (01/22/22 10:43:30)                 Narrative:    Test Reason : R07.9,    Vent. Rate : 090 BPM     Atrial Rate : 090 BPM     P-R Int : 150 ms          QRS Dur : 084 ms      QT Int : 372 ms       P-R-T Axes : 003 007 098 degrees     QTc Int : 455 ms    Normal sinus rhythm  Nonspecific T wave abnormality  Abnormal ECG  When compared with ECG of 20-JAN-2022 19:58,  No significant change was found  Confirmed by Crow Bauer MD ([id number]) on 1/22/2022 10:43:13 AM    Referred By: [email or web address]   SELF           Confirmed By:Crow Bauer MD                              Results for orders placed during the hospital encounter of 11/28/21    Echo    Interpretation Summary  · The left ventricle is normal in size with normal systolic function. The estimated ejection fraction is 60%.  · Normal right ventricular size with normal right ventricular systolic function.  · Normal left ventricular diastolic function.  · Mechanically ventilated; cannot use inferior caval vein diameter to estimate central venous pressure.      X-Ray Chest AP Portable  Narrative:  EXAMINATION:  XR CHEST AP PORTABLE    CLINICAL HISTORY:  chest discomfort;    TECHNIQUE:  Single frontal view of the chest was performed.    COMPARISON:  01/20/2022    FINDINGS:  Again noted is right diaphragmatic elevation with a meniscus sign, likely indicating at least a small volume right pleural effusion.  The left lung field and pleural space remains clear.  The heart size appears normal.  There is mild calcification of the aortic knob consistent with atherosclerotic stigmata.  Tunneled right hemodialysis catheter noted.  Impression: No interval detrimental change identified.    Electronically signed by: Rosaline Farah  Date:    01/21/2022  Time:    13:34      Labs and Imaging within the last 24 hours listed above were reviewed.       Diet: Diet diabetic [facility]; 2000 Calorie; Renal  GI Prophylaxis: Continued, chronic acid suppression therapy as OP and Indicated due to multiple minor risk factors  Significant LDAs:   IV Access Type: Dialysis Access  Urinary Catheter Indication if present: Patient Does Not Have Urinary Catheter  Other Lines/Tubes/Drains:    HIGH RISK CONDITION(S):   Patient has an abrupt change in neurologic status: Weakness     Goals of Care:    Previous admission:  1/20/22  Likely prognosis:  Fair  Code Status: Full Code  Comfort Only: No  Hospice: No  Goals at discharge: remain at home, with physician follow-up    Discharge Planning   FLORENTINO: 2/7/2022     Code Status: Full Code   Is the patient medically ready for discharge?: Yes    Reason for patient still in hospital (select all that apply): Patient trending condition, Laboratory test and Pending disposition  Discharge Plan A: Skilled Nursing Facility   Discharge Delays: (!) Post-Acute Set-up        Assessment/Plan:      * Dialysis patient  See CKD4  -patient has had trouble getting to dialysis from home and is unable to pay co-pay for skilled nursing; she is having a ramp built at her home and transportation will need to be arranged  prior to discharge    Discharge planning issues  Extensively discussed HD arrangements on 2/2/2022 with the patient when she was at skilled nursing as below:    when the patient was at SNF she was transferred into a wheelchair or stretcher to go to dialysis. The HD unit then used a christina lift to transfer her from the stretcher/wheelchair into their recliner. This worked fine per the patient. She sleeps in a lift chair at home and has not been transferred via christina to wheelchair by her sister.    Based on this, a hospital bed for home ordered . Recommend having her sister come in for training with PT to see if she can independently transfer the patient from hospital bed/lift chair to wheelchair at home. Then the patient can have reliable wheelchair transport with a transport service.  If the patient's sister cannot transfer with the lift chair and no one else is available to transport her, alternative placement such as assisted NH may have to be pursued.    She has missed most HD from home due to stretcher unavailability although she has transportation benefits.     Pressure injury of right buttock, stage 3  Would Care consulted  Appreciate recs    Urinary tract infection due to ESBL Klebsiella  See UTI    Anemia of chronic disease  stable    Secondary hypertension  Proteinuria on labs  Added lisinopril 10mg 1/24    Debility  PT/OT consulted  SNF recommended; pt wants OSNF but has a copay for further SNF stay which she cannot provide  -- appreciate SW assistance  -- plan for home with home health once transportation issues resolved (her main reason for missing HD)  -  she has a christina lift at home for transfers if family can assist    A-fib  Continue apixaban 2.5mg BID  Continue metoprolol    ZAK (acute kidney injury)  Dialysis dependent ZAK  Nephrology consulted, appreciate recs  Suprapubic Cath declogged in ED, see UTI  Low albumin and history of nephrotic syndrome  - Strict I&Os  - Avoid nephrotoxic agents  -  "Renally adjust medications  - Prealbumin low  - P/C ratio elevated 9.33  -- History of nephrotic syndrome    Normocytic anemia  At baseline on admission  Anemia of chronic disease  Monitor CBC   Transfuse if Hbg <7  Checking iron studies, folate, B12, and Epo with HD    S/P left mastectomy  See malignant neoplasm of left breast    Requires daily assistance for activities of daily living (ADL) and comfort needs  Continue PT/OT; Ray lift at home to assist with transporting to OP HD.    Malignant neoplasm of left breast, estrogen receptor positive  History of breast cancer status post radical mastectomy on 8/1/2019 on anastrozole   Stable  Continue anastrozole 1 mg daily    Cervicogenic migraine  PRNs ordered as she takes at home  -referral for f/u with her Neurologist    CKD stage 4 due to type 2 diabetes mellitus  Dialysis dependent  Nephrology consulted, appreciate recs  Last HD 1/25 - "S/p HD session today. Pt made 3 L urine . Will hold off on next HD session and evaluate the need for RRT on daily basis"  Labs pending  Nephrology recs pending for dialysis needs  1/28 - plan for HD, patient with good urine output, may require RRT twice weekly  - final nephrology recs: Dialysis dependent ZAK. Continue HD    Chronic pain  PRN multimodal pain regimen    Long term prescription opiate use   with Percocet and butalbital-aceaminophen-caffeine  See chronic pain, migraines    Morbid obesity due to excess calories  Body mass index is 45.3 kg/m². Morbid obesity complicates all aspects of disease management from diagnostic modalities to treatment. Weight loss encouraged and health benefits explained to patient.   RD consulted  2000 calorie diabetic diet  BOOST    Suprapubic catheter  See neurogenic bladder    Uncontrolled type 2 diabetes mellitus with stage 4 chronic kidney disease, with long-term current use of insulin  A1c (1/4/2022) 5.4%  Diabetic diet  Continue to monitor  NovoLog insulin as needed for " hyperglycemia  Glucose monitoring    Major depressive disorder, recurrent episode, moderate  Not on any home medications  Continue to monitor  Consider Psych consult if worsening affect    Neurogenic bladder  Continue oxybutynin 10mg daily, furosemide 160mg BID  Suprapubic catheter    Recurrent urinary tract infection  History of ESBL and MDRO with current UTI symptoms, dirty UA, and suprapubic catheter.  - consulted ID, appreciate recs  --  Plan on 3d of Meropenem (D3/3) then Bactrim DS qod x 3 doses  -- Exchange SP cath after abx are complete  - UCx ESBL  I/D Recs as follows:  Plan: 1. Continue Meropenem x 7d total (from SP change 1/23 as urine still cloudy) - eoc 1/30 2. Rec SNF placement to complete IV abx 3. Contact isolation given prior ESBL 4. weekly CBC and CMP while on meropene 5. Discussed with ID staff 6. Needs urology close fu after dc 7. Will sign off  With end date of antibiotics 1/30/22    Hyponatremia  Chronic hyponatremia 130 baseline, 126 on admission  Secondary to fluid overload, UTI  Nephro consulted for HD  Na improved    VIJAYA (obstructive sleep apnea)  CPAP QHS    Hyperlipidemia associated with type 2 diabetes mellitus  Continue atorvastatin 80mg daily, gemfibrozil 600mg MWF    Fibromyalgia  PT/OT  Pain management regimen    Hypothyroidism  Continue levothyroxine 50mcg daily        Active Hospital Problems    Diagnosis  POA    *Dialysis patient [Z99.2]  Not Applicable     Chronic    Discharge planning issues [Z02.9]  Not Applicable    Pressure injury of right buttock, stage 3 [L89.313]  Yes    Urinary tract infection due to ESBL Klebsiella [N39.0, B96.89]  Yes    Chronic kidney disease-mineral and bone disorder [N18.9, E83.9, M89.9]  Yes    Anemia of chronic disease [D63.8]  Yes    Secondary hypertension [I15.9]  Yes    Debility [R53.81]  Yes     Chronic    A-fib [I48.91]  Yes     Chronic    ZAK (acute kidney injury) [N17.9]  Yes    Normocytic anemia [D64.9]  Yes     Chronic    S/P  left mastectomy [Z90.12]  Not Applicable     Chronic    Requires daily assistance for activities of daily living (ADL) and comfort needs [Z74.1]  Yes     Chronic    Malignant neoplasm of left breast, estrogen receptor positive [C50.912, Z17.0]  Not Applicable     Chronic    Cervicogenic migraine [G43.809]  Yes     Chronic    CKD stage 4 due to type 2 diabetes mellitus [E11.22, N18.4]  Yes     Chronic     [facility]      Chronic pain [G89.29]  Yes     Chronic    Long term prescription opiate use [Z79.891]  Not Applicable     Chronic    Morbid obesity due to excess calories [E66.01]  Yes     Chronic    Suprapubic catheter [Z93.59]  Not Applicable     Chronic    Uncontrolled type 2 diabetes mellitus with stage 4 chronic kidney disease, with long-term current use of insulin [E11.22, E11.65, N18.4, Z79.4]  Not Applicable    Major depressive disorder, recurrent episode, moderate [F33.1]  Yes     Chronic    Neurogenic bladder [N31.9]  Yes     Chronic    Recurrent urinary tract infection [N39.0]  Yes    Hyponatremia [E87.1]  Yes     Chronic    VIJAYA (obstructive sleep apnea) [G47.33]  Yes     Chronic    Hyperlipidemia associated with type 2 diabetes mellitus [E11.69, E78.5]  Yes     Chronic    Fibromyalgia [M79.7]  Yes     Chronic    Hypothyroidism [E03.9]  Yes     Chronic      Resolved Hospital Problems   No resolved problems to display.       Inpatient Medications Prescribed for Management of Current Problems:     Scheduled Meds:    anastrozole  1 mg Oral Daily    apixaban  2.5 mg Oral BID    atorvastatin  80 mg Oral Daily    cloNIDine  0.2 mg Oral TID    epoetin chloe-ebpx (RETACRIT) injection  50 Units/kg Intravenous Every Tues, Thurs, Sat    ergocalciferol  50,000 Units Oral Q7 Days    famotidine  20 mg Oral Daily    furosemide  160 mg Oral BID    gemfibroziL  600 mg Oral Every Mon, Wed, Fri    levothyroxine  50 mcg Oral Before breakfast    lisinopriL  10 mg Oral Daily    metoprolol tartrate   25 mg Oral BID    oxybutynin  10 mg Oral Daily    polyethylene glycol  17 g Oral BID    senna-docusate 8.6-50 mg  1 tablet Oral BID    venlafaxine  150 mg Oral Daily     Continuous Infusions:   As Needed: acetaminophen, butalbital-acetaminophen-caffeine -40 mg, dextrose 50%, dextrose 50%, glucagon (human recombinant), glucose, glucose, heparin (porcine), influenza, insulin aspart U-100, melatonin, methocarbamoL, ondansetron, oxyCODONE, sodium chloride 0.9%, sodium chloride 0.9%, sodium chloride 0.9%, sodium chloride 0.9%, sodium chloride 0.9%, sodium chloride 0.9%, sumatriptan    VTE Risk Mitigation (From admission, onward)         Ordered     heparin (porcine) injection 1,000 Units  As needed (PRN)         02/03/22 0800     apixaban tablet 2.5 mg  2 times daily         01/21/22 1609     IP VTE HIGH RISK PATIENT  Once         01/21/22 1609     Place sequential compression device  Until discontinued         01/21/22 1609              I have assessed these finding virtually using telemed platform and with assistance of bedside nurse     The attending portion of this evaluation, treatment, and documentation was performed per Liana Ruiz MD via Telemedicine AudioVisual using the secure [employer] software platform with 2 way audio/video. The provider was located off-site and the patient is located in the hospital. The aforementioned video software was utilized to document the relevant history and physical exam.    Liana Ruiz MD  Department of Hospital Medicine   [facility]

## 2022-02-04 NOTE — ASSESSMENT & PLAN NOTE
Extensively discussed HD arrangements on 2/2/2022 with the patient when she was at skilled nursing as below:    when the patient was at SNF she was transferred into a wheelchair or stretcher to go to dialysis. The HD unit then used a christina lift to transfer her from the stretcher/wheelchair into their recliner. This worked fine per the patient. She sleeps in a lift chair at home and has not been transferred via christina to wheelchair by her sister.    Based on this, a hospital bed for home ordered . Recommend having her sister come in for training with PT to see if she can independently transfer the patient from hospital bed/lift chair to wheelchair at home. Then the patient can have reliable wheelchair transport with a transport service.  If the patient's sister cannot transfer with the lift chair and no one else is available to transport her, alternative placement such as prison NH may have to be pursued.    She has missed most HD from home due to stretcher unavailability although she has transportation benefits.

## 2022-02-04 NOTE — ASSESSMENT & PLAN NOTE
At baseline on admission  Anemia of chronic disease  Monitor CBC   Transfuse if Hbg <7  Checking iron studies, folate, B12, and Epo with HD

## 2022-02-04 NOTE — PLAN OF CARE
AAOx4 w/ VSS during shift. CBG monitored and managed w/ insulin. Pain managed w/ PO analgesics. Supapubic catheter output pink urine and MD notified. Collection device changed and waiting for urine to complete a UA.

## 2022-02-05 VITALS
DIASTOLIC BLOOD PRESSURE: 63 MMHG | TEMPERATURE: 97 F | SYSTOLIC BLOOD PRESSURE: 140 MMHG | HEIGHT: 63 IN | HEART RATE: 74 BPM | WEIGHT: 255.75 LBS | OXYGEN SATURATION: 94 % | BODY MASS INDEX: 45.32 KG/M2 | RESPIRATION RATE: 16 BRPM

## 2022-02-05 LAB
POCT GLUCOSE: 158 MG/DL (ref 70–110)
POCT GLUCOSE: 158 MG/DL (ref 70–110)
POCT GLUCOSE: 177 MG/DL (ref 70–110)

## 2022-02-05 PROCEDURE — 25000003 PHARM REV CODE 250: Mod: HCNC

## 2022-02-05 PROCEDURE — 99239 HOSP IP/OBS DSCHRG MGMT >30: CPT | Mod: HCNC,95,, | Performed by: INTERNAL MEDICINE

## 2022-02-05 PROCEDURE — 25000003 PHARM REV CODE 250: Mod: HCNC | Performed by: NURSE PRACTITIONER

## 2022-02-05 PROCEDURE — 80100014 HC HEMODIALYSIS 1:1: Mod: HCNC

## 2022-02-05 PROCEDURE — 99239 PR HOSPITAL DISCHARGE DAY,>30 MIN: ICD-10-PCS | Mod: HCNC,95,, | Performed by: INTERNAL MEDICINE

## 2022-02-05 PROCEDURE — 1111F DSCHRG MED/CURRENT MED MERGE: CPT | Mod: HCNC,95,CPTII, | Performed by: INTERNAL MEDICINE

## 2022-02-05 PROCEDURE — 1111F PR DISCHARGE MEDS RECONCILED W/ CURRENT OUTPATIENT MED LIST: ICD-10-PCS | Mod: HCNC,95,CPTII, | Performed by: INTERNAL MEDICINE

## 2022-02-05 PROCEDURE — 25000003 PHARM REV CODE 250: Mod: HCNC | Performed by: STUDENT IN AN ORGANIZED HEALTH CARE EDUCATION/TRAINING PROGRAM

## 2022-02-05 RX ADMIN — FUROSEMIDE 160 MG: 80 TABLET ORAL at 09:02

## 2022-02-05 RX ADMIN — VENLAFAXINE HYDROCHLORIDE 150 MG: 75 CAPSULE, EXTENDED RELEASE ORAL at 09:02

## 2022-02-05 RX ADMIN — METHOCARBAMOL 750 MG: 750 TABLET ORAL at 09:02

## 2022-02-05 RX ADMIN — OXYBUTYNIN CHLORIDE 10 MG: 10 TABLET, FILM COATED, EXTENDED RELEASE ORAL at 09:02

## 2022-02-05 RX ADMIN — OXYCODONE HYDROCHLORIDE 10 MG: 10 TABLET ORAL at 09:02

## 2022-02-05 RX ADMIN — ANASTROZOLE 1 MG: 1 TABLET, COATED ORAL at 09:02

## 2022-02-05 RX ADMIN — FAMOTIDINE 20 MG: 20 TABLET ORAL at 09:02

## 2022-02-05 RX ADMIN — METOPROLOL TARTRATE 25 MG: 25 TABLET, FILM COATED ORAL at 09:02

## 2022-02-05 RX ADMIN — ERGOCALCIFEROL 50000 UNITS: 1.25 CAPSULE ORAL at 09:02

## 2022-02-05 RX ADMIN — ATORVASTATIN CALCIUM 80 MG: 20 TABLET, FILM COATED ORAL at 09:02

## 2022-02-05 RX ADMIN — CLONIDINE HYDROCHLORIDE 0.2 MG: 0.2 TABLET ORAL at 09:02

## 2022-02-05 RX ADMIN — SENNOSIDES AND DOCUSATE SODIUM 1 TABLET: 50; 8.6 TABLET ORAL at 09:02

## 2022-02-05 RX ADMIN — LISINOPRIL 10 MG: 10 TABLET ORAL at 09:02

## 2022-02-05 RX ADMIN — APIXABAN 2.5 MG: 2.5 TABLET, FILM COATED ORAL at 09:02

## 2022-02-05 RX ADMIN — LEVOTHYROXINE SODIUM 50 MCG: 50 TABLET ORAL at 06:02

## 2022-02-05 NOTE — DISCHARGE SUMMARY
[facility]  Telemedicine Discharge Summary      Patient Name: Cecile Bowen  MRN: [id number]  Patient Class: IP- Inpatient  Admission Date: 1/21/2022  Hospital Length of Stay: 11 days  Discharge Date and Time:  02/05/2022 9:40 AM  Attending Physician: Liana Ruiz MD   Discharging Provider: Liana Ruiz MD  Primary Care Provider: Kailey Navarro MD      HPI:   Cecile Bowen maikel 71F with ESRD (on HD), DM2, HTN, breast cancer s/p mastectomy, and recurrent UTI presents with chest heaviness. Pt recently seen in ED for similar chest heaviness. She reports a productive cough and missing some medications. She has a chronic suprapubic catheter of 5 years and is bed bound at baseline, but has a wheelchair for mobility but rarely uses it. She was started on HD 2 months ago and has issues making HD appointments due to transportation. She has missed her last two appointments. Last session was Monday prior to discharge from [facility]. She had been at SNF since 12/30/21 when she was admitted after a long hospital stay after being found down at home 11/28/21. During her hospital stay, she was treated for possible STEMI, worsening CKD, metabolic acidosis and required intubation for acute respiratory failure. She was discharged to [facility] and returned home on 1/17/22 after her HD session.    In the ED, she is afebrile and HD stable. Labs positive for dirty UA, hyponatremia, hypokalemia, hypochloridemia, elevated BUN, elevated Cr, and elevated BNP. She was started on Rocephin for UTI and nephrology was consulted for HD. She was admitted to hospital medicine for further management.      * No surgery found *      Hospital Course:   Patient admitted for UTI and missed dialysis appointments due to transportation issues. Patient started on Rocephin for UTI and ID was consulted for antibiotic recommendations as patient has history of ESBL and MDRO. Pt transitioned to meropenem followed by  Bactrim for 3 days for ESBL UTI. Midline consult ordered for extended abx coverage. Nephrology was consulted for HD while inpatient. Social work was consulted for discharge planning, working on SNF placement. PT/OT consulted and recommending SNF. Wound care consulted for skin breakdown with recs.   1/28 patient with significant lower extremity edema, Cr 1.8 > 2.1, Na 125. Continue furosemide 160 mg BID per nephrology recs. Per nephrology, even though good urine O/p, patient may benefit from RRT at least 2 times a week for clearances and volume removal. Session of HD planned.   See Problems below for additional details.    Goals of Care Treatment Preferences:  Code Status: Full Code    Living Will: Yes          Consults:   Consults (From admission, onward)        Status Ordering Provider     Inpatient virtual consult to Hospital Medicine  Once        Provider:  (Not yet assigned)    Completed VANCE HUGHES     Inpatient consult to SNF  Once        Provider:  (Not yet assigned)    Acknowledged MAURISIO SUTHERLAND     Inpatient virtual consult to Hospital Medicine  Once        Provider:  (Not yet assigned)    Completed MAGDALENE MASON     Inpatient consult to Social Work  Once        Provider:  (Not yet assigned)    Acknowledged VANCE HUGHES     Inpatient consult to Infectious Diseases  Once        Provider:  (Not yet assigned)    Completed VANCE HUGHES     Inpatient consult to Registered Dietitian/Nutritionist  Once        Provider:  (Not yet assigned)    Completed VANCE HUGHES     Inpatient consult to Nephrology  Once        Provider:  (Not yet assigned)    Completed BIA DEAN           * Dialysis patient  See CKD4  -patient has had trouble getting to dialysis from home and is unable to pay co-pay for skilled nursing; she is having a ramp built at her home and transportation will need to be arranged prior to discharge     Pressure injury of right buttock, stage 3  Would Care consulted  Appreciate  "recs     Urinary tract infection due to ESBL Klebsiella  See UTI     Anemia of chronic disease  stable     Secondary hypertension  Proteinuria on labs  Added lisinopril 10mg 1/24     Debility  PT/OT consulted  SNF recommended; pt wants OSNF but has a copay for further SNF stay which she cannot provide  -- appreciate SW assistance  -- plan for home with home health once transportation issues resolved (her main reason for missing HD)  -  she has a ray lift at home for transfers if family can assist     A-fib  Continue apixaban 2.5mg BID  Continue metoprolol     ZAK (acute kidney injury)  Dialysis dependent ZAK  Nephrology consulted, appreciate recs  Suprapubic Cath declogged in ED, see UTI  Low albumin and history of nephrotic syndrome  - Strict I&Os  - Avoid nephrotoxic agents  - Renally adjust medications  - Prealbumin low  - P/C ratio elevated 9.33  -- History of nephrotic syndrome, follow up with Nephrology.     Normocytic anemia  At baseline on admission  Anemia of chronic disease  Monitor CBC   Transfuse if Hbg <7  Checking iron studies, folate, B12, and Epo with HD     S/P left mastectomy  See malignant neoplasm of left breast     Requires daily assistance for activities of daily living (ADL) and comfort needs  Continue PT/OT; Ray lift at home to assist with transporting to OP HD.     Malignant neoplasm of left breast, estrogen receptor positive  History of breast cancer status post radical mastectomy on 8/1/2019 on anastrozole   Stable  Continue anastrozole 1 mg daily     Cervicogenic migraine  PRNs ordered as she takes at home  -referral for f/u with her Neurologist     CKD stage 4 due to type 2 diabetes mellitus  Dialysis dependent  Nephrology consulted, appreciate recs  Last HD 1/25 - "S/p HD session today. Pt made 3 L urine . Will hold off on next HD session and evaluate the need for RRT on daily basis"  Nephrology recs pending for dialysis needs  1/28 - plan for HD, patient with good urine output, may " require RRT twice weekly  - final nephrology recs: Dialysis dependent ZAK. Continue HD     Chronic pain  PRN multimodal pain regimen     Long term prescription opiate use   with Percocet and butalbital-aceaminophen-caffeine  See chronic pain, migraines     Morbid obesity due to excess calories  Body mass index is 45.3 kg/m². Morbid obesity complicates all aspects of disease management from diagnostic modalities to treatment. Weight loss encouraged and health benefits explained to patient.        RD consulted  2000 calorie diabetic diet  BOOST     Suprapubic catheter  See neurogenic bladder     Uncontrolled type 2 diabetes mellitus with stage 4 chronic kidney disease, with long-term current use of insulin  A1c (1/4/2022) 5.4%  Diabetic diet  Continue to monitor  NovoLog insulin as needed for hyperglycemia  Glucose monitoring     Major depressive disorder, recurrent episode, moderate  Not on any home medications  Continue to monitor  Consider Psych consult if worsening affect     Neurogenic bladder  Continue oxybutynin 10mg daily, furosemide 160mg BID  Suprapubic catheter  Next change due 2/20     Recurrent urinary tract infection  History of ESBL and MDRO with current UTI symptoms, dirty UA, and suprapubic catheter.  - consulted ID, appreciate recs  -- Treated with 3d of Meropenem (D3/3) then Bactrim DS qod x 3 doses  -- Exchange SP cath after abx are complete  - UCx ESBL  I/D Recs as follows:  Plan: 1. Continue Meropenem x 7d total (from SP change 1/23 as urine still cloudy) - eoc 1/30 2. Rec SNF placement to complete IV abx 3. Contact isolation given prior ESBL 4. weekly CBC and CMP while on meropene 5. Discussed with ID staff 6. Needs urology close fu after dc 7. Will sign off  With end date of antibiotics 1/30/22     Hyponatremia  Chronic hyponatremia 130 baseline, 126 on admission  Secondary to fluid overload, UTI  Nephro consulted for HD  Na improved, stable     VIJAYA (obstructive sleep apnea)  CPAP  QHS     Hyperlipidemia associated with type 2 diabetes mellitus  Continue atorvastatin 80mg daily, gemfibrozil 600mg MWF     Fibromyalgia  PT/OT  Pain management regimen     Hypothyroidism  Continue levothyroxine 50mcg daily       Final Active Diagnoses:    Diagnosis Date Noted POA    PRINCIPAL PROBLEM:  Dialysis patient [Z99.2] 01/21/2022 Not Applicable     Chronic    Pressure injury of right buttock, stage 3 [L89.313] 01/26/2022 Yes    Urinary tract infection due to ESBL Klebsiella [N39.0, B96.89]  Yes    Chronic kidney disease-mineral and bone disorder [N18.9, E83.9, M89.9] 01/25/2022 Yes    Anemia of chronic disease [D63.8] 01/25/2022 Yes    Secondary hypertension [I15.9] 01/24/2022 Yes    Debility [R53.81] 12/01/2021 Yes     Chronic    A-fib [I48.91] 11/29/2021 Yes     Chronic    ZAK (acute kidney injury) [N17.9] 11/28/2021 Yes    Normocytic anemia [D64.9] 09/30/2021 Yes     Chronic    S/P left mastectomy [Z90.12] 09/25/2019 Not Applicable     Chronic    Requires daily assistance for activities of daily living (ADL) and comfort needs [Z74.1] 09/25/2019 Yes     Chronic    Malignant neoplasm of left breast, estrogen receptor positive [C50.912, Z17.0] 08/01/2019 Not Applicable     Chronic    Cervicogenic migraine [G43.809] 02/01/2019 Yes     Chronic    CKD stage 4 due to type 2 diabetes mellitus [E11.22, N18.4] 01/20/2019 Yes     Chronic    Chronic pain [G89.29] 03/15/2018 Yes     Chronic    Long term prescription opiate use [Z79.891] 01/26/2017 Not Applicable     Chronic    Morbid obesity due to excess calories [E66.01] 01/09/2017 Yes     Chronic    Suprapubic catheter [Z93.59] 09/19/2016 Not Applicable     Chronic    Uncontrolled type 2 diabetes mellitus with stage 4 chronic kidney disease, with long-term current use of insulin [E11.22, E11.65, N18.4, Z79.4] 07/06/2016 Not Applicable    Major depressive disorder, recurrent episode, moderate [F33.1] 03/10/2016 Yes     Chronic    Neurogenic  "bladder [N31.9] 12/14/2015 Yes     Chronic    Recurrent urinary tract infection [N39.0] 02/24/2015 Yes    Hyponatremia [E87.1] 09/22/2014 Yes     Chronic    VIJAYA (obstructive sleep apnea) [G47.33] 03/17/2014 Yes     Chronic    Hyperlipidemia associated with type 2 diabetes mellitus [E11.69, E78.5] 03/11/2014 Yes     Chronic    Fibromyalgia [M79.7] 12/17/2012 Yes     Chronic    Hypothyroidism [E03.9] 12/12/2012 Yes     Chronic      Problems Resolved During this Admission:    Diagnosis Date Noted Date Resolved POA    Discharge planning issues [Z02.9] 02/02/2022 02/04/2022 Not Applicable       Discharged Condition: stable    Disposition: Home-Health Care [facility]    Follow Up:   Follow-up Information     Kailey Navarro MD. Schedule an appointment as soon as possible for a visit in 1 week.    Specialty: Internal Medicine  Why: For discharge from hospital follow up  Contact information:  [address] [facility] [address]  [phone]             [facility]. Call in 3 days.    Why: To establish care                     Patient Instructions:      HOSPITAL BED FOR HOME USE     Order Specific Question Answer Comments   Type: Semi-electric    Length of need (1-99 months): 99    Does patient have medical equipment at home? wheelchair    Does patient have medical equipment at home? power chair    Does patient have medical equipment at home? lift device    Height: 5' 3" (1.6 m)    Weight: 116 kg (255 lb 11.7 oz)    Please check all that apply: Patient requires a bed height different than a fixed height hospital bed to permit transfers to chair, wheelchair, or standing.    Please check all that apply: Patient requires frequent changes in body position and/or has an immediate need for a change in body position.    Please check all that apply: Patient requires positioning of the body in ways not feasible in an ordinary bed due to a medical condition which is expected to last at least one month.  "     Ambulatory referral/consult to [facility] - Medical & Palliative   Standing Status: Future   Referral Priority: Routine Referral Type: Consultation   Referral Reason: Specialty Services Required   Number of Visits Requested: 1     Ambulatory referral/consult to Outpatient Case Management   Referral Priority: Routine Referral Type: Consultation   Referral Reason: Specialty Services Required   Number of Visits Requested: 1     Diet diabetic     Diet renal     Notify your health care provider if you experience any of the following:  temperature >100.4     Notify your health care provider if you experience any of the following:  persistent nausea and vomiting or diarrhea     Notify your health care provider if you experience any of the following:  difficulty breathing or increased cough     Notify your health care provider if you experience any of the following:  severe persistent headache     Notify your health care provider if you experience any of the following:  persistent dizziness, light-headedness, or visual disturbances     Notify your health care provider if you experience any of the following:  increased confusion or weakness     Activity as tolerated       Significant Diagnostic Studies:     Recent Labs   Lab 09/05/21  2139 12/21/21  0803 01/03/22  0000   HGBA1C 6.6* 5.3 5.4     Recent Labs   Lab 02/02/22 0406 02/03/22 0401 02/04/22  0257   WBC 6.41 6.72 8.28   HGB 8.0* 8.3* 8.2*   HCT 25.4* 25.8* 25.4*    246 251     Recent Labs   Lab 02/02/22 0406 02/02/22 0406 02/03/22 0401 02/04/22  0257   GRAN 49.6  3.2   < > 53.5  3.6 60.0  5.0   LYMPH 32.8  2.1   < > 30.8  2.1 25.8  2.1   MONO 10.9  0.7   < > 8.9  0.6 9.3  0.8   EOS 0.3  --  0.4 0.3    < > = values in this interval not displayed.     Recent Labs   Lab 02/02/22 0406 02/03/22 0401 02/04/22 0257   * 132* 129*   K 3.5 3.7 3.5   CL 97 95 96   CO2 29 29 24   BUN 25* 30* 24*   CREATININE 1.6* 1.8* 1.3   *  173* 181*   CALCIUM 8.1* 8.8 8.3*   MG 1.5* 1.5* 1.6   PHOS 3.6 4.6* 3.2     Recent Labs   Lab 09/28/21  1749 09/28/21  2112 10/01/21  0351 11/1952 11/29/21  0413 12/06/21  0758 01/03/22  0000 01/23/22  0332   PROCAL  --   --   --   --   --  0.69*  --   --    LACTATE 1.1 1.1  --  0.8  --   --   --   --    FERRITIN  --   --    < >  --  757*  --  353* 812*    < > = values in this interval not displayed.     Results for orders placed or performed in visit on 01/12/22   Vitamin D   Result Value Ref Range    Vit D, 25-Hydroxy 61.7 30.0 - 100.0 ng/mL     SARS-CoV2 (COVID-19) Qualitative PCR (no units)   Date Value   12/28/2021 Not Detected   12/21/2021 Not Detected   09/05/2021 Not Detected     POC Rapid COVID (no units)   Date Value   01/21/2022 Negative   01/20/2022 Negative   11/28/2021 Negative   09/28/2021 Negative   09/05/2021 Negative   05/14/2021 Negative       ECG Results          EKG 12-lead (Final result)  Result time 01/22/22 10:43:30    Final result by Interface, Lab In [facility] (01/22/22 10:43:30)                 Narrative:    Test Reason : R07.9,    Vent. Rate : 090 BPM     Atrial Rate : 090 BPM     P-R Int : 150 ms          QRS Dur : 084 ms      QT Int : 372 ms       P-R-T Axes : 003 007 098 degrees     QTc Int : 455 ms    Normal sinus rhythm  Nonspecific T wave abnormality  Abnormal ECG  When compared with ECG of 20-JAN-2022 19:58,  No significant change was found  Confirmed by Crow Bauer MD ([id number]) on 1/22/2022 10:43:13 AM    Referred By: [email or web address]   SELF           Confirmed By:Crow Bauer MD                              Results for orders placed during the hospital encounter of 11/28/21    Echo    Interpretation Summary  · The left ventricle is normal in size with normal systolic function. The estimated ejection fraction is 60%.  · Normal right ventricular size with normal right ventricular systolic function.  · Normal left ventricular diastolic function.  · Mechanically ventilated; cannot  use inferior caval vein diameter to estimate central venous pressure.      X-Ray Chest AP Portable  Narrative: EXAMINATION:  XR CHEST AP PORTABLE    CLINICAL HISTORY:  chest discomfort;    TECHNIQUE:  Single frontal view of the chest was performed.    COMPARISON:  01/20/2022    FINDINGS:  Again noted is right diaphragmatic elevation with a meniscus sign, likely indicating at least a small volume right pleural effusion.  The left lung field and pleural space remains clear.  The heart size appears normal.  There is mild calcification of the aortic knob consistent with atherosclerotic stigmata.  Tunneled right hemodialysis catheter noted.  Impression: No interval detrimental change identified.    Electronically signed by: Rosaline Farah  Date:    01/21/2022  Time:    13:34       Medications:  Reconciled Home Medications:      Medication List      START taking these medications    lisinopriL 10 MG tablet  Take 1 tablet (10 mg total) by mouth once daily.     venlafaxine 150 MG Cp24  Commonly known as: EFFEXOR-XR  Take 1 capsule (150 mg total) by mouth once daily.        CHANGE how you take these medications    butalbital-acetaminophen-caffeine -40 mg -40 mg per tablet  Commonly known as: FIORICET, ESGIC  TAKE 1 TABLET BY MOUTH WHEN NOT CONTROLLED BY OTHER MEDS. NO MORE THAN 10 TABS PER 30 DAYS  What changed: See the new instructions.     magnesium oxide 400 mg (241.3 mg magnesium) tablet  Commonly known as: MAG-OX  TAKE 1 TABLET (400 MG TOTAL) BY MOUTH 2 (TWO) TIMES DAILY.  What changed: See the new instructions.     SYNTHROID 50 MCG tablet  Generic drug: levothyroxine  TAKE 1 TABLET BY MOUTH EVERY DAY  What changed:   · how much to take  · when to take this  · additional instructions        CONTINUE taking these medications    albuterol 90 mcg/actuation inhaler  Commonly known as: PROVENTIL HFA  Inhale 2 puffs into the lungs every 6 (six) hours as needed for Wheezing or Shortness of Breath. Rescue    "  anastrozole 1 mg Tab  Commonly known as: ARIMIDEX  TAKE 1 TABLET BY MOUTH EVERY DAY     apixaban 2.5 mg Tab  Commonly known as: ELIQUIS  Take 1 tablet (2.5 mg total) by mouth 2 (two) times daily.     atorvastatin 80 MG tablet  Commonly known as: LIPITOR  Take 1 tablet (80 mg total) by mouth once daily.     BD INSULIN SYRINGE ULTRA-FINE 0.5 mL 31 gauge x 5/16" Syrg  Generic drug: insulin syringe-needle U-100  USE WITH INSULIN 4 TIMES A DAY     * blood sugar diagnostic Strp  ONE TOUCH ULTRA Check blood sugars 1-2 x a day     * blood sugar diagnostic Strp  To check BG 4  times daily, to use with insurance preferred meter     blood-glucose meter kit  To check BG 4 times daily, to use with insurance preferred meter     cloNIDine 0.2 MG tablet  Commonly known as: CATAPRES  Take 1 tablet (0.2 mg total) by mouth 3 (three) times daily.     ergocalciferol 50,000 unit Cap  Commonly known as: ERGOCALCIFEROL  TAKE 1 CAPSULE (50,000 UNITS TOTAL) BY MOUTH EVERY 7 DAYS. TAKE ONE CAPSULE BY MOUTH ONE TIME PER WEEK, TAKES ON TUESDAYS     famotidine 20 MG tablet  Commonly known as: PEPCID  Take 1 tablet (20 mg total) by mouth once daily.     furosemide 80 MG tablet  Commonly known as: LASIX  Take 2 tablets (160 mg total) by mouth 2 (two) times daily.     gemfibroziL 600 MG tablet  Commonly known as: LOPID  Take 1 tablet (600 mg total) by mouth every Mon, Wed, Fri.     * lancets Misc  One touch ultra, checks 1-2 x a day     * lancets Misc  To check BG 4 times daily, to use with insurance preferred meter     melatonin 3 mg tablet  Commonly known as: MELATIN  Take 2 tablets (6 mg total) by mouth nightly as needed for Insomnia.     methocarbamoL 750 MG Tab  Commonly known as: ROBAXIN  TAKE 1-2 TABLETS (750-1,500 MG TOTAL) BY MOUTH 3 (THREE) TIMES DAILY AS NEEDED.     metoprolol tartrate 25 MG tablet  Commonly known as: LOPRESSOR  Take 1 tablet (25 mg total) by mouth 2 (two) times daily.     oxybutynin 10 MG 24 hr tablet  Commonly known " "as: DITROPAN-XL  TAKE 1 TABLET BY MOUTH EVERY DAY     pen needle, diabetic 31 gauge x 5/16" Ndle  Commonly known as: BD ULTRA-FINE SHORT PEN NEEDLE  USE WITH LANTUS DAILY, e 11.65     polyethylene glycol 17 gram Pwpk  Commonly known as: GLYCOLAX  One packet QD prn constipation     sevelamer carbonate 800 mg Tab  Commonly known as: RENVELA  Take 1 tablet (800 mg total) by mouth 3 (three) times daily with meals.     sumatriptan 100 MG tablet  Commonly known as: IMITREX  TAKE 1 TABLET (100 MG TOTAL) BY MOUTH 2 (TWO) TIMES DAILY AS NEEDED FOR MIGRAINE.     traZODone 100 MG tablet  Commonly known as: DESYREL  Take 1 tablet (100 mg total) by mouth nightly as needed for Insomnia.         * This list has 4 medication(s) that are the same as other medications prescribed for you. Read the directions carefully, and ask your doctor or other care provider to review them with you.                Indwelling Lines/Drains at time of discharge:   Lines/Drains/Airways     Central Venous Catheter Line                 Hemodialysis Catheter 12/21/21 1550 right internal jugular 45 days          Drain                 Suprapubic Catheter 01/23/22 1530 16 Fr. 12 days                Time spent on the discharge of patient: 50 minutes  This service was provided by Virtual Visit.   Patient was seen and examined on the date of discharge.  Additional time was spent speaking with consultants and case management, reviewing records, and/or discussing the plan of care with patient/family.  The patient location is: [address] [place] [address] A  Admitted 1/21/2022 11:36 AM  Present with the patient at the time of the telemed/virtual assessment: Telepresenter    Patient was transferred to [facility] Medicine on:  1/26/2022   This document was prepared by chart review and may not directly reflect my personal knowledge of the patient's case, clinical course, or significant events during the hospital stay.    The attending portion of this evaluation, treatment, and " documentation was performed per Liana Ruiz MD via Telemedicine AudioVisual using the secure [employer] software platform with 2 way audio/video. The provider was located off-site and the patient is located in the hospital. The aforementioned video software was utilized to document the relevant history and physical exam.      Liana Ruiz MD  Department of [facility] Medicine  [facility] Surg

## 2022-02-05 NOTE — PLAN OF CARE
[facility]  Discharge Final Note    Primary Care Provider: Kailey Navarro MD    Expected Discharge Date: 2/5/2022    Final Discharge Note (most recent)       Final Note - 02/05/22 1102          Final Note    Assessment Type Final Discharge Note     Anticipated Discharge Disposition Home-Health Care Svc (P)         Post-Acute Status    Home Health Status Set-up Complete/Auth obtained (P)      Discharge Delays None known at this time (P)                      Important Message from Medicare  Important Message from Medicare regarding Discharge Appeal Rights: Given to patient/caregiver,Explained to patient/caregiver,Signed/date by patient/caregiver     Date IMM was signed: 02/04/22  Time IMM was signed: 1248    Contact Info       Kailey Navarro MD   Specialty: Internal Medicine   Relationship: PCP - General    [address] [id number]   Phone: [phone]       Next Steps: Schedule an appointment as soon as possible for a visit in 1 week(s)    Instructions: For discharge from hospital follow up    [facility]        Next Steps: Call in 3 day(s)    Instructions: To establish care          Transport updated to 1 PM with Shahzad chauhan [facility]. Nurse confirmed receipt of HD on Friday 2/4.    No further post acute needs.     Sofia Linder LMSW  Case Management Social Worker   [facility] [address]

## 2022-02-05 NOTE — PROGRESS NOTES
[facility] Medicine  Telemedicine Progress Note    Patient Name: Cecile Bowen  MRN: [id number]  Patient Class: IP- Inpatient   Admission Date: 1/21/2022  Length of Stay: 10 days  Attending Physician: Liana Ruiz MD  Primary Care Provider: Kailey Navarro MD      Subjective:     Principal Problem:Dialysis patient    HPI:  Cecile Bowen maikel 71F with ESRD (on HD), DM2, HTN, breast cancer s/p mastectomy, and recurrent UTI presents with chest heaviness. Pt recently seen in ED for similar chest heaviness. She reports a productive cough and missing some medications. She has a chronic suprapubic catheter of 5 years and is bed bound at baseline, but has a wheelchair for mobility but rarely uses it. She was started on HD 2 months ago and has issues making HD appointments due to transportation. She has missed her last two appointments. Last session was Monday prior to discharge from SNF. She had been at SNF since 12/30/21 when she was admitted after a long hospital stay after being found down at home 11/28/21. During her hospital stay, she was treated for possible STEMI, worsening CKD, metabolic acidosis and required intubation for acute respiratory failure. She was discharged to [facility] and returned home on 1/17/22 after her HD session.    In the ED, she is afebrile and HD stable. Labs positive for dirty UA, hyponatremia, hypokalemia, hypochloridemia, elevated BUN, elevated Cr, and elevated BNP. She was started on Rocephin for UTI and nephrology was consulted for HD. She was admitted to hospital medicine for further management.      Overview/Hospital Course:  Patient admitted for UTI and missed dialysis appointments due to transportation issues. Patient started on Rocephin for UTI and ID was consulted for antibiotic recommendations as patient has history of ESBL and MDRO. Pt transitioned to meropenem followed by Bactrim for 3 days for ESBL UTI. Midline consult ordered for  extended abx coverage. Nephrology was consulted for HD while inpatient. Social work was consulted for discharge planning, working on SNF placement. PT/OT consulted and recommending SNF. Wound care consulted for skin breakdown with recs.     1/28 patient with significant lower extremity edema, Cr 1.8 > 2.1, Na 125. Continue furosemide 160 mg BID per nephrology recs. Per nephrology, even though good urine O/p, patient may benefit from RRT at least 2 times a week for clearances and volume removal. Session of HD planned today. Plan for SNF vs Home with HH, anticipate discharge 1/31.       Telemedicine  This service was provided by Virtual Visit.    Patient was transferred to [facility] on:  1/26/2022     Chief Complaint   Patient presents with    dialysis     PT has not had dialysis since Monday and missed dialysis Wed.      The patient location is: 8[facility] A   Admitted 1/21/2022 11:36 AM  Present with the patient at the time of the telemed/virtual assessment: Telepresenter    Interval History / Events Overnight:   The patient is able to provide adequate history. Additional history was obtained from past medical records. No significant events reported by Nursing. Patient with no urinary symptoms. BP noted to be elevated.  Patient complains of nothing specific. Symptoms have improved since yesterday. Associated symptoms include: fatigue. Symptoms are decreasing in severity.     Lab test(s) reviewed: H&H stable    Review of Systems   Constitutional: Negative for fever.   Respiratory: Negative for shortness of breath.      Objective:     Vital Signs (Most Recent):  Temp: 98.4 °F (36.9 °C) (02/04/22 0745)  Pulse: 72 (02/04/22 0745)  Resp: 17 (02/04/22 0912)  BP: 129/64 (02/04/22 0745)  SpO2: 98 % (02/04/22 0745) Vital Signs (24h Range):  Temp:  [97.6 °F (36.4 °C)-98.5 °F (36.9 °C)] 98.4 °F (36.9 °C)  Pulse:  [70-85] 72  Resp:  [16-19] 17  SpO2:  [93 %-98 %] 98 %  BP: (111-155)/(49-70) 129/64     Weight: 116  kg (255 lb 11.7 oz)  Body mass index is 45.3 kg/m².    Intake/Output Summary (Last 24 hours) at 2/4/2022 1134  Last data filed at 2/4/2022 0437  Gross per 24 hour   Intake 550.74 ml   Output 2900 ml   Net -2349.26 ml      Physical Exam  Constitutional:       General: She is not in acute distress.     Appearance: Normal appearance. She is not diaphoretic.   Eyes:      General: Lids are normal. No scleral icterus.        Right eye: No discharge.         Left eye: No discharge.      Conjunctiva/sclera: Conjunctivae normal.   Cardiovascular:      Rate and Rhythm: Normal rate.   Pulmonary:      Effort: Pulmonary effort is normal. No tachypnea, accessory muscle usage or respiratory distress.   Abdominal:      General: There is no distension.   Skin:     Coloration: Skin is not cyanotic.   Neurological:      Mental Status: She is alert. She is not disoriented.   Psychiatric:         Attention and Perception: Attention normal.         Mood and Affect: Affect normal.         Behavior: Behavior is cooperative.         Significant Labs:   Recent Labs   Lab 02/03/22  2316   COLORU Yellow   SPECGRAV 1.010   PHUR 6.0   PROTEINUA 2+*   BACTERIA Many*   Urine dipstick shows positive for WBC's and positive for RBC's.    Micro exam: >100K WBC's per HPF and >100K RBC's per HPF.    Recent Labs   Lab 09/05/21  2139 12/21/21  0803 01/03/22  0000   HGBA1C 6.6* 5.3 5.4   :   Recent Labs   Lab 02/03/22  1604 02/03/22  2102 02/04/22  0712   POCTGLUCOSE 220* 281* 181*     Recent Labs   Lab 02/02/22  0406 02/03/22  0401 02/04/22  0257   WBC 6.41 6.72 8.28   HGB 8.0* 8.3* 8.2*   HCT 25.4* 25.8* 25.4*    246 251     Recent Labs   Lab 02/02/22  0406 02/02/22  0406 02/03/22  0401 02/04/22  0257   GRAN 49.6  3.2   < > 53.5  3.6 60.0  5.0   LYMPH 32.8  2.1   < > 30.8  2.1 25.8  2.1   MONO 10.9  0.7   < > 8.9  0.6 9.3  0.8   EOS 0.3  --  0.4 0.3    < > = values in this interval not displayed.     Recent Labs   Lab 02/02/22  7340  02/03/22  0401 02/04/22  0257   * 132* 129*   K 3.5 3.7 3.5   CL 97 95 96   CO2 29 29 24   BUN 25* 30* 24*   CREATININE 1.6* 1.8* 1.3   * 173* 181*   CALCIUM 8.1* 8.8 8.3*   MG 1.5* 1.5* 1.6   PHOS 3.6 4.6* 3.2     Recent Labs   Lab 09/28/21  1749 09/28/21  2112 10/01/21  0351 11/1952 11/29/21  0413 12/06/21  0758 01/03/22  0000 01/23/22  0332   PROCAL  --   --   --   --   --  0.69*  --   --    LACTATE 1.1 1.1  --  0.8  --   --   --   --    FERRITIN  --   --    < >  --  757*  --  353* 812*    < > = values in this interval not displayed.     Results for orders placed or performed in visit on 01/12/22   Vitamin D   Result Value Ref Range    Vit D, 25-Hydroxy 61.7 30.0 - 100.0 ng/mL     SARS-CoV2 (COVID-19) Qualitative PCR (no units)   Date Value   12/28/2021 Not Detected   12/21/2021 Not Detected   09/05/2021 Not Detected     POC Rapid COVID (no units)   Date Value   01/21/2022 Negative   01/20/2022 Negative   11/28/2021 Negative   09/28/2021 Negative   09/05/2021 Negative   05/14/2021 Negative       ECG Results          EKG 12-lead (Final result)  Result time 01/22/22 10:43:30    Final result by Interface, Lab In [facility] (01/22/22 10:43:30)                 Narrative:    Test Reason : R07.9,    Vent. Rate : 090 BPM     Atrial Rate : 090 BPM     P-R Int : 150 ms          QRS Dur : 084 ms      QT Int : 372 ms       P-R-T Axes : 003 007 098 degrees     QTc Int : 455 ms    Normal sinus rhythm  Nonspecific T wave abnormality  Abnormal ECG  When compared with ECG of 20-JAN-2022 19:58,  No significant change was found  Confirmed by Crow Bauer MD ([id number]) on 1/22/2022 10:43:13 AM    Referred By: ELVA   SELF           Confirmed By:Crow Bauer MD                              Results for orders placed during the hospital encounter of 11/28/21    Echo    Interpretation Summary  · The left ventricle is normal in size with normal systolic function. The estimated ejection fraction is 60%.  · Normal  right ventricular size with normal right ventricular systolic function.  · Normal left ventricular diastolic function.  · Mechanically ventilated; cannot use inferior caval vein diameter to estimate central venous pressure.      X-Ray Chest AP Portable  Narrative: EXAMINATION:  XR CHEST AP PORTABLE    CLINICAL HISTORY:  chest discomfort;    TECHNIQUE:  Single frontal view of the chest was performed.    COMPARISON:  01/20/2022    FINDINGS:  Again noted is right diaphragmatic elevation with a meniscus sign, likely indicating at least a small volume right pleural effusion.  The left lung field and pleural space remains clear.  The heart size appears normal.  There is mild calcification of the aortic knob consistent with atherosclerotic stigmata.  Tunneled right hemodialysis catheter noted.  Impression: No interval detrimental change identified.    Electronically signed by: Rosaline Farah  Date:    01/21/2022  Time:    13:34      Labs and Imaging within the last 24 hours listed above were reviewed.       Diet: Diet diabetic [facility]; 2000 Calorie; Renal  GI Prophylaxis: Continued, chronic acid suppression therapy as OP and Indicated due to multiple minor risk factors  Significant LDAs:   IV Access Type: Dialysis Access  Urinary Catheter Indication if present: Patient Does Not Have Urinary Catheter  Other Lines/Tubes/Drains:    HIGH RISK CONDITION(S):   Patient has an abrupt change in neurologic status: Weakness     Goals of Care:    Previous admission:  1/20/22  Likely prognosis:  Fair  Code Status: Full Code  Comfort Only: No  Hospice: No  Goals at discharge: remain at home, with physician follow-up    Discharge Planning   FLORENTINO: 2/5/2022     Code Status: Full Code   Is the patient medically ready for discharge?: Yes    Reason for patient still in hospital (select all that apply): Patient trending condition and Pending disposition  Discharge Plan A: Home Health   Discharge Delays: None known at this  time        Assessment/Plan:      * Dialysis patient  See CKD4  -patient has had trouble getting to dialysis from home and is unable to pay co-pay for skilled nursing; she is having a ramp built at her home and transportation will need to be arranged prior to discharge    Pressure injury of right buttock, stage 3  Would Care consulted  Appreciate recs    Urinary tract infection due to ESBL Klebsiella  See UTI    Anemia of chronic disease  stable    Secondary hypertension  Proteinuria on labs  Added lisinopril 10mg 1/24    Debility  PT/OT consulted  SNF recommended; pt wants OSNF but has a copay for further SNF stay which she cannot provide  -- appreciate SW assistance  -- plan for home with home health once transportation issues resolved (her main reason for missing HD)  -  she has a ray lift at home for transfers if family can assist    A-fib  Continue apixaban 2.5mg BID  Continue metoprolol    ZAK (acute kidney injury)  Dialysis dependent ZAK  Nephrology consulted, appreciate recs  Suprapubic Cath declogged in ED, see UTI  Low albumin and history of nephrotic syndrome  - Strict I&Os  - Avoid nephrotoxic agents  - Renally adjust medications  - Prealbumin low  - P/C ratio elevated 9.33  -- History of nephrotic syndrome, follow up with Nephrology.    Normocytic anemia  At baseline on admission  Anemia of chronic disease  Monitor CBC   Transfuse if Hbg <7  Checking iron studies, folate, B12, and Epo with HD    S/P left mastectomy  See malignant neoplasm of left breast    Requires daily assistance for activities of daily living (ADL) and comfort needs  Continue PT/OT; Ray lift at home to assist with transporting to OP HD.    Malignant neoplasm of left breast, estrogen receptor positive  History of breast cancer status post radical mastectomy on 8/1/2019 on anastrozole   Stable  Continue anastrozole 1 mg daily    Cervicogenic migraine  PRNs ordered as she takes at home  -referral for f/u with her Neurologist    CKD  "stage 4 due to type 2 diabetes mellitus  Dialysis dependent  Nephrology consulted, appreciate recs  Last HD 1/25 - "S/p HD session today. Pt made 3 L urine . Will hold off on next HD session and evaluate the need for RRT on daily basis"  Nephrology recs pending for dialysis needs  1/28 - plan for HD, patient with good urine output, may require RRT twice weekly  - final nephrology recs: Dialysis dependent ZAK. Continue HD    Chronic pain  PRN multimodal pain regimen    Long term prescription opiate use   with Percocet and butalbital-aceaminophen-caffeine  See chronic pain, migraines    Morbid obesity due to excess calories  Body mass index is 45.3 kg/m². Morbid obesity complicates all aspects of disease management from diagnostic modalities to treatment. Weight loss encouraged and health benefits explained to patient.   RD consulted  2000 calorie diabetic diet  BOOST    Suprapubic catheter  See neurogenic bladder    Uncontrolled type 2 diabetes mellitus with stage 4 chronic kidney disease, with long-term current use of insulin  A1c (1/4/2022) 5.4%  Diabetic diet  Continue to monitor  NovoLog insulin as needed for hyperglycemia  Glucose monitoring    Major depressive disorder, recurrent episode, moderate  Not on any home medications  Continue to monitor  Consider Psych consult if worsening affect    Neurogenic bladder  Continue oxybutynin 10mg daily, furosemide 160mg BID  Suprapubic catheter  Next change due 2/20    Recurrent urinary tract infection  History of ESBL and MDRO with current UTI symptoms, dirty UA, and suprapubic catheter.  - consulted ID, appreciate recs  --  Plan on 3d of Meropenem (D3/3) then Bactrim DS qod x 3 doses  -- Exchange SP cath after abx are complete  - UCx ESBL  I/D Recs as follows:  Plan: 1. Continue Meropenem x 7d total (from SP change 1/23 as urine still cloudy) - eoc 1/30 2. Rec SNF placement to complete IV abx 3. Contact isolation given prior ESBL 4. weekly CBC and CMP while on " meropene 5. Discussed with ID staff 6. Needs urology close fu after dc 7. Will sign off  With end date of antibiotics 1/30/22    Hyponatremia  Chronic hyponatremia 130 baseline, 126 on admission  Secondary to fluid overload, UTI  Nephro consulted for HD  Na improved, stable    VIJAYA (obstructive sleep apnea)  CPAP QHS    Hyperlipidemia associated with type 2 diabetes mellitus  Continue atorvastatin 80mg daily, gemfibrozil 600mg MWF    Fibromyalgia  PT/OT  Pain management regimen    Hypothyroidism  Continue levothyroxine 50mcg daily      Active Hospital Problems    Diagnosis  POA    *Dialysis patient [Z99.2]  Not Applicable     Chronic    Pressure injury of right buttock, stage 3 [L89.313]  Yes    Urinary tract infection due to ESBL Klebsiella [N39.0, B96.89]  Yes    Chronic kidney disease-mineral and bone disorder [N18.9, E83.9, M89.9]  Yes    Anemia of chronic disease [D63.8]  Yes    Secondary hypertension [I15.9]  Yes    Debility [R53.81]  Yes     Chronic    A-fib [I48.91]  Yes     Chronic    ZAK (acute kidney injury) [N17.9]  Yes    Normocytic anemia [D64.9]  Yes     Chronic    S/P left mastectomy [Z90.12]  Not Applicable     Chronic    Requires daily assistance for activities of daily living (ADL) and comfort needs [Z74.1]  Yes     Chronic    Malignant neoplasm of left breast, estrogen receptor positive [C50.912, Z17.0]  Not Applicable     Chronic    Cervicogenic migraine [G43.809]  Yes     Chronic    CKD stage 4 due to type 2 diabetes mellitus [E11.22, N18.4]  Yes     Chronic     [facility]      Chronic pain [G89.29]  Yes     Chronic    Long term prescription opiate use [Z79.891]  Not Applicable     Chronic    Morbid obesity due to excess calories [E66.01]  Yes     Chronic    Suprapubic catheter [Z93.59]  Not Applicable     Chronic    Uncontrolled type 2 diabetes mellitus with stage 4 chronic kidney disease, with long-term current use of insulin [E11.22, E11.65, N18.4, Z79.4]  Not Applicable     Major depressive disorder, recurrent episode, moderate [F33.1]  Yes     Chronic    Neurogenic bladder [N31.9]  Yes     Chronic    Recurrent urinary tract infection [N39.0]  Yes    Hyponatremia [E87.1]  Yes     Chronic    VIJAYA (obstructive sleep apnea) [G47.33]  Yes     Chronic    Hyperlipidemia associated with type 2 diabetes mellitus [E11.69, E78.5]  Yes     Chronic    Fibromyalgia [M79.7]  Yes     Chronic    Hypothyroidism [E03.9]  Yes     Chronic      Resolved Hospital Problems    Diagnosis Date Resolved POA    Discharge planning issues [Z02.9] 02/04/2022 Not Applicable       Inpatient Medications Prescribed for Management of Current Problems:     Scheduled Meds:    [START ON 2/5/2022] sodium chloride 0.9%   Intravenous Once    anastrozole  1 mg Oral Daily    apixaban  2.5 mg Oral BID    atorvastatin  80 mg Oral Daily    cloNIDine  0.2 mg Oral TID    epoetin chloe-ebpx (RETACRIT) injection  50 Units/kg Intravenous Every Tues, Thurs, Sat    ergocalciferol  50,000 Units Oral Q7 Days    famotidine  20 mg Oral Daily    furosemide  160 mg Oral BID    gemfibroziL  600 mg Oral Every Mon, Wed, Fri    levothyroxine  50 mcg Oral Before breakfast    lisinopriL  10 mg Oral Daily    metoprolol tartrate  25 mg Oral BID    oxybutynin  10 mg Oral Daily    polyethylene glycol  17 g Oral BID    senna-docusate 8.6-50 mg  1 tablet Oral BID    venlafaxine  150 mg Oral Daily     Continuous Infusions:   As Needed: acetaminophen, butalbital-acetaminophen-caffeine -40 mg, dextrose 50%, dextrose 50%, glucagon (human recombinant), glucose, glucose, heparin (porcine), [START ON 2/5/2022] heparin (porcine), influenza, insulin aspart U-100, melatonin, methocarbamoL, ondansetron, oxyCODONE, sodium chloride 0.9%, sodium chloride 0.9%, sodium chloride 0.9%, sodium chloride 0.9%, sodium chloride 0.9%, sodium chloride 0.9%, sodium chloride 0.9%, sumatriptan    VTE Risk Mitigation (From admission, onward)          Ordered     heparin (porcine) injection 1,000 Units  As needed (PRN)         02/04/22 1632     heparin (porcine) injection 1,000 Units  As needed (PRN)         02/03/22 0800     apixaban tablet 2.5 mg  2 times daily         01/21/22 1609     IP VTE HIGH RISK PATIENT  Once         01/21/22 1609     Place sequential compression device  Until discontinued         01/21/22 1609              I have assessed these finding virtually using telemed platform and with assistance of bedside nurse     The attending portion of this evaluation, treatment, and documentation was performed per Liana Ruiz MD via Telemedicine AudioVisual using the secure [employer] software platform with 2 way audio/video. The provider was located off-site and the patient is located in the hospital. The aforementioned video software was utilized to document the relevant history and physical exam    Liana Ruiz MD  Department of Hospital Medicine   [facility]

## 2022-02-05 NOTE — ASSESSMENT & PLAN NOTE
"Dialysis dependent  Nephrology consulted, appreciate recs  Last HD 1/25 - "S/p HD session today. Pt made 3 L urine . Will hold off on next HD session and evaluate the need for RRT on daily basis"  Nephrology recs pending for dialysis needs  1/28 - plan for HD, patient with good urine output, may require RRT twice weekly  - final nephrology recs: Dialysis dependent ZAK. Continue HD  "

## 2022-02-05 NOTE — ASSESSMENT & PLAN NOTE
Chronic hyponatremia 130 baseline, 126 on admission  Secondary to fluid overload, UTI  Nephro consulted for HD  Na improved, stable

## 2022-02-05 NOTE — ASSESSMENT & PLAN NOTE
Extensively discussed HD arrangements on 2/2/2022 with the patient when she was at skilled nursing as below:    when the patient was at SNF she was transferred into a wheelchair or stretcher to go to dialysis. The HD unit then used a christina lift to transfer her from the stretcher/wheelchair into their recliner. This worked fine per the patient. She sleeps in a lift chair at home and has not been transferred via christina to wheelchair by her sister.    Based on this, a hospital bed for home ordered . Recommend having her sister come in for training with PT to see if she can independently transfer the patient from hospital bed/lift chair to wheelchair at home. Then the patient can have reliable wheelchair transport with a transport service.  If the patient's sister cannot transfer with the lift chair and no one else is available to transport her, alternative placement such as [facility] may have to be pursued.    She has missed HD from home due to stretcher unavailability although she has transportation benefits.

## 2022-02-05 NOTE — PLAN OF CARE
AAOx4 w/ VSS during shift. CBG monitored and managed w/ insulin. Pain managed w/ PO analgesics. Supapubic catheter output pink urine towards night shift. Edu night shift to monitor urine throughout the evening.

## 2022-02-05 NOTE — PROGRESS NOTES
Hemodialysis treatment initiaited at right jugular/subcl. CVC access. Total fluid goal is 3.5L with Net goal of 3L.

## 2022-02-06 NOTE — PLAN OF CARE
AAOx4 w/ VSS during shift. CBG monitored and managed w/ insulin. Pain managed w/ PO analgesics. Supapubic catheter output pink urine intermittent during shift. Frequents done w/ med pass and vital signs. Pt Dc'd per MD. IV removed and dressed per protocol. All personal belongings are w/ pt. Pt left via stretcher w/ EMS on christina lift.

## 2022-02-07 ENCOUNTER — PATIENT OUTREACH (OUTPATIENT)
Dept: ADMINISTRATIVE | Facility: CLINIC | Age: 72
End: 2022-02-07
Payer: MEDICARE

## 2022-02-07 ENCOUNTER — TELEPHONE (OUTPATIENT)
Dept: UROLOGY | Facility: CLINIC | Age: 72
End: 2022-02-07
Payer: MEDICARE

## 2022-02-07 NOTE — TELEPHONE ENCOUNTER
I don't see where this patient every got booked from   December. She will need urine culture prior.   Message 1/10/22.

## 2022-02-10 ENCOUNTER — PES CALL (OUTPATIENT)
Dept: HOME HEALTH SERVICES | Facility: CLINIC | Age: 72
End: 2022-02-10
Payer: MEDICARE

## 2022-02-10 LAB
HBV SURFACE AB SER-ACNC: <10 MIU/ML
HBV SURFACE AG SERPL QL IA: NEGATIVE
HCV AB SERPL QL IA: NONREACTIVE
HCV S/CO RATIO(INDEX): 0.05 (ref 0–0.79)
PTH, INTACT: 69 PG/ML (ref 16–80)

## 2022-02-11 ENCOUNTER — HOSPITAL ENCOUNTER (INPATIENT)
Facility: HOSPITAL | Age: 72
LOS: 11 days | Discharge: HOME HEALTH | DRG: 659 | End: 2022-02-25
Attending: EMERGENCY MEDICINE | Admitting: STUDENT IN AN ORGANIZED HEALTH CARE EDUCATION/TRAINING PROGRAM
Payer: MEDICARE

## 2022-02-11 DIAGNOSIS — Z93.59 SUPRAPUBIC CATHETER: Chronic | ICD-10-CM

## 2022-02-11 DIAGNOSIS — N20.1 URETERAL STONE: Primary | ICD-10-CM

## 2022-02-11 DIAGNOSIS — N20.1 CALCULUS OF URETER: ICD-10-CM

## 2022-02-11 DIAGNOSIS — E11.22 STAGE 4 CHRONIC KIDNEY DISEASE DUE TO TYPE 2 DIABETES MELLITUS: Chronic | ICD-10-CM

## 2022-02-11 DIAGNOSIS — R07.9 CHEST PAIN: ICD-10-CM

## 2022-02-11 DIAGNOSIS — A41.9 SEPSIS, DUE TO UNSPECIFIED ORGANISM, UNSPECIFIED WHETHER ACUTE ORGAN DYSFUNCTION PRESENT: ICD-10-CM

## 2022-02-11 DIAGNOSIS — N18.4 STAGE 4 CHRONIC KIDNEY DISEASE DUE TO TYPE 2 DIABETES MELLITUS: Chronic | ICD-10-CM

## 2022-02-11 DIAGNOSIS — N18.6 CKD (CHRONIC KIDNEY DISEASE) REQUIRING CHRONIC DIALYSIS: ICD-10-CM

## 2022-02-11 DIAGNOSIS — N31.9 NEUROGENIC BLADDER: Chronic | ICD-10-CM

## 2022-02-11 DIAGNOSIS — N30.01 ACUTE CYSTITIS WITH HEMATURIA: ICD-10-CM

## 2022-02-11 DIAGNOSIS — Z79.4 TYPE 2 DIABETES MELLITUS WITH DIABETIC NEUROPATHY, WITH LONG-TERM CURRENT USE OF INSULIN: ICD-10-CM

## 2022-02-11 DIAGNOSIS — E11.40 TYPE 2 DIABETES MELLITUS WITH DIABETIC NEUROPATHY, WITH LONG-TERM CURRENT USE OF INSULIN: ICD-10-CM

## 2022-02-11 DIAGNOSIS — Z99.2 CKD (CHRONIC KIDNEY DISEASE) REQUIRING CHRONIC DIALYSIS: ICD-10-CM

## 2022-02-11 DIAGNOSIS — N20.0 NEPHROLITHIASIS: ICD-10-CM

## 2022-02-11 DIAGNOSIS — N11.1 CHRONIC OBSTRUCTIVE PYELONEPHRITIS: ICD-10-CM

## 2022-02-11 DIAGNOSIS — I95.9 HYPOTENSION: ICD-10-CM

## 2022-02-11 LAB
ALBUMIN SERPL BCP-MCNC: 2.3 G/DL (ref 3.5–5.2)
ALP SERPL-CCNC: 110 U/L (ref 55–135)
ALT SERPL W/O P-5'-P-CCNC: 16 U/L (ref 10–44)
ALUMINUM SERPL-MCNC: <5 MCG/L (ref 0–10)
ANION GAP SERPL CALC-SCNC: 13 MMOL/L (ref 8–16)
AST SERPL-CCNC: 19 U/L (ref 10–40)
BACTERIA #/AREA URNS AUTO: ABNORMAL /HPF
BASOPHILS # BLD AUTO: 0.07 K/UL (ref 0–0.2)
BASOPHILS NFR BLD: 0.9 % (ref 0–1.9)
BILIRUB SERPL-MCNC: 0.3 MG/DL (ref 0.1–1)
BILIRUB UR QL STRIP: NEGATIVE
BUN SERPL-MCNC: 36 MG/DL (ref 8–23)
CALCIUM SERPL-MCNC: 9.3 MG/DL (ref 8.7–10.5)
CHLORIDE SERPL-SCNC: 92 MMOL/L (ref 95–110)
CLARITY UR REFRACT.AUTO: ABNORMAL
CO2 SERPL-SCNC: 26 MMOL/L (ref 23–29)
COLOR UR AUTO: YELLOW
CREAT SERPL-MCNC: 2.3 MG/DL (ref 0.5–1.4)
CTP QC/QA: YES
DIFFERENTIAL METHOD: ABNORMAL
EOSINOPHIL # BLD AUTO: 0.6 K/UL (ref 0–0.5)
EOSINOPHIL NFR BLD: 7.2 % (ref 0–8)
ERYTHROCYTE [DISTWIDTH] IN BLOOD BY AUTOMATED COUNT: 15.5 % (ref 11.5–14.5)
EST. GFR  (AFRICAN AMERICAN): 24.3 ML/MIN/1.73 M^2
EST. GFR  (NON AFRICAN AMERICAN): 21.1 ML/MIN/1.73 M^2
GLUCOSE SERPL-MCNC: 224 MG/DL (ref 70–110)
GLUCOSE UR QL STRIP: NEGATIVE
HCT VFR BLD AUTO: 27.3 % (ref 37–48.5)
HGB BLD-MCNC: 8.5 G/DL (ref 12–16)
HGB UR QL STRIP: ABNORMAL
HYALINE CASTS UR QL AUTO: 0 /LPF
IMM GRANULOCYTES # BLD AUTO: 0.07 K/UL (ref 0–0.04)
IMM GRANULOCYTES NFR BLD AUTO: 0.9 % (ref 0–0.5)
KETONES UR QL STRIP: NEGATIVE
LACTATE SERPL-SCNC: 1.4 MMOL/L (ref 0.5–2.2)
LACTATE SERPL-SCNC: 1.8 MMOL/L (ref 0.5–2.2)
LEUKOCYTE ESTERASE UR QL STRIP: ABNORMAL
LYMPHOCYTES # BLD AUTO: 2.2 K/UL (ref 1–4.8)
LYMPHOCYTES NFR BLD: 27.3 % (ref 18–48)
MCH RBC QN AUTO: 29.6 PG (ref 27–31)
MCHC RBC AUTO-ENTMCNC: 31.1 G/DL (ref 32–36)
MCV RBC AUTO: 95 FL (ref 82–98)
MICROSCOPIC COMMENT: ABNORMAL
MONOCYTES # BLD AUTO: 0.9 K/UL (ref 0.3–1)
MONOCYTES NFR BLD: 11.7 % (ref 4–15)
NEUTROPHILS # BLD AUTO: 4.2 K/UL (ref 1.8–7.7)
NEUTROPHILS NFR BLD: 52 % (ref 38–73)
NITRITE UR QL STRIP: NEGATIVE
NRBC BLD-RTO: 0 /100 WBC
PH UR STRIP: 5 [PH] (ref 5–8)
PLATELET # BLD AUTO: 377 K/UL (ref 150–450)
PMV BLD AUTO: 9.7 FL (ref 9.2–12.9)
POCT GLUCOSE: 136 MG/DL (ref 70–110)
POCT GLUCOSE: 247 MG/DL (ref 70–110)
POTASSIUM SERPL-SCNC: 3.9 MMOL/L (ref 3.5–5.1)
PROCALCITONIN SERPL IA-MCNC: 0.12 NG/ML
PROT SERPL-MCNC: 6.5 G/DL (ref 6–8.4)
PROT UR QL STRIP: ABNORMAL
RBC # BLD AUTO: 2.87 M/UL (ref 4–5.4)
RBC #/AREA URNS AUTO: 28 /HPF (ref 0–4)
SARS-COV-2 RDRP RESP QL NAA+PROBE: NEGATIVE
SODIUM SERPL-SCNC: 131 MMOL/L (ref 136–145)
SP GR UR STRIP: 1.02 (ref 1–1.03)
URN SPEC COLLECT METH UR: ABNORMAL
WBC # BLD AUTO: 8.06 K/UL (ref 3.9–12.7)
WBC #/AREA URNS AUTO: >100 /HPF (ref 0–5)
WBC CLUMPS UR QL AUTO: ABNORMAL

## 2022-02-11 PROCEDURE — 87088 URINE BACTERIA CULTURE: CPT | Mod: HCNC | Performed by: EMERGENCY MEDICINE

## 2022-02-11 PROCEDURE — 25000003 PHARM REV CODE 250: Mod: HCNC | Performed by: PHYSICIAN ASSISTANT

## 2022-02-11 PROCEDURE — 99285 EMERGENCY DEPT VISIT HI MDM: CPT | Mod: 25,HCNC

## 2022-02-11 PROCEDURE — 87077 CULTURE AEROBIC IDENTIFY: CPT | Mod: HCNC | Performed by: EMERGENCY MEDICINE

## 2022-02-11 PROCEDURE — 81001 URINALYSIS AUTO W/SCOPE: CPT | Mod: HCNC | Performed by: EMERGENCY MEDICINE

## 2022-02-11 PROCEDURE — 96365 THER/PROPH/DIAG IV INF INIT: CPT | Mod: HCNC

## 2022-02-11 PROCEDURE — 80053 COMPREHEN METABOLIC PANEL: CPT | Mod: HCNC | Performed by: EMERGENCY MEDICINE

## 2022-02-11 PROCEDURE — 96372 THER/PROPH/DIAG INJ SC/IM: CPT | Mod: HCNC | Performed by: PHYSICIAN ASSISTANT

## 2022-02-11 PROCEDURE — 87086 URINE CULTURE/COLONY COUNT: CPT | Mod: HCNC | Performed by: EMERGENCY MEDICINE

## 2022-02-11 PROCEDURE — 25000003 PHARM REV CODE 250: Mod: HCNC | Performed by: EMERGENCY MEDICINE

## 2022-02-11 PROCEDURE — 99214 OFFICE O/P EST MOD 30 MIN: CPT | Mod: HCNC,,, | Performed by: NURSE PRACTITIONER

## 2022-02-11 PROCEDURE — 85025 COMPLETE CBC W/AUTO DIFF WBC: CPT | Mod: HCNC | Performed by: EMERGENCY MEDICINE

## 2022-02-11 PROCEDURE — 99214 PR OFFICE/OUTPT VISIT, EST, LEVL IV, 30-39 MIN: ICD-10-PCS | Mod: HCNC,,, | Performed by: NURSE PRACTITIONER

## 2022-02-11 PROCEDURE — 87186 SC STD MICRODIL/AGAR DIL: CPT | Mod: HCNC | Performed by: EMERGENCY MEDICINE

## 2022-02-11 PROCEDURE — 99220 PR INITIAL OBSERVATION CARE,LEVL III: ICD-10-PCS | Mod: HCNC,,, | Performed by: PHYSICIAN ASSISTANT

## 2022-02-11 PROCEDURE — 99220 PR INITIAL OBSERVATION CARE,LEVL III: CPT | Mod: HCNC,,, | Performed by: PHYSICIAN ASSISTANT

## 2022-02-11 PROCEDURE — 87040 BLOOD CULTURE FOR BACTERIA: CPT | Mod: HCNC | Performed by: EMERGENCY MEDICINE

## 2022-02-11 PROCEDURE — 63600175 PHARM REV CODE 636 W HCPCS: Mod: HCNC | Performed by: PHYSICIAN ASSISTANT

## 2022-02-11 PROCEDURE — 84145 PROCALCITONIN (PCT): CPT | Mod: HCNC | Performed by: EMERGENCY MEDICINE

## 2022-02-11 PROCEDURE — 93010 ELECTROCARDIOGRAM REPORT: CPT | Mod: HCNC,,, | Performed by: INTERNAL MEDICINE

## 2022-02-11 PROCEDURE — 63600175 PHARM REV CODE 636 W HCPCS: Mod: HCNC | Performed by: EMERGENCY MEDICINE

## 2022-02-11 PROCEDURE — 99285 EMERGENCY DEPT VISIT HI MDM: CPT | Mod: HCNC,CS,, | Performed by: EMERGENCY MEDICINE

## 2022-02-11 PROCEDURE — U0002 COVID-19 LAB TEST NON-CDC: HCPCS | Mod: HCNC | Performed by: EMERGENCY MEDICINE

## 2022-02-11 PROCEDURE — G0378 HOSPITAL OBSERVATION PER HR: HCPCS | Mod: HCNC

## 2022-02-11 PROCEDURE — 93010 EKG 12-LEAD: ICD-10-PCS | Mod: HCNC,,, | Performed by: INTERNAL MEDICINE

## 2022-02-11 PROCEDURE — 99285 PR EMERGENCY DEPT VISIT,LEVEL V: ICD-10-PCS | Mod: HCNC,CS,, | Performed by: EMERGENCY MEDICINE

## 2022-02-11 PROCEDURE — 96366 THER/PROPH/DIAG IV INF ADDON: CPT | Mod: HCNC

## 2022-02-11 PROCEDURE — 93005 ELECTROCARDIOGRAM TRACING: CPT | Mod: HCNC

## 2022-02-11 PROCEDURE — 83605 ASSAY OF LACTIC ACID: CPT | Mod: HCNC | Performed by: EMERGENCY MEDICINE

## 2022-02-11 PROCEDURE — C9399 UNCLASSIFIED DRUGS OR BIOLOG: HCPCS | Mod: HCNC | Performed by: PHYSICIAN ASSISTANT

## 2022-02-11 RX ORDER — SUMATRIPTAN 50 MG/1
100 TABLET, FILM COATED ORAL 2 TIMES DAILY PRN
Status: DISCONTINUED | OUTPATIENT
Start: 2022-02-11 | End: 2022-02-25 | Stop reason: HOSPADM

## 2022-02-11 RX ORDER — INSULIN ASPART 100 [IU]/ML
0-5 INJECTION, SOLUTION INTRAVENOUS; SUBCUTANEOUS
Status: DISCONTINUED | OUTPATIENT
Start: 2022-02-11 | End: 2022-02-25 | Stop reason: HOSPADM

## 2022-02-11 RX ORDER — FAMOTIDINE 20 MG/1
20 TABLET, FILM COATED ORAL DAILY
Status: DISCONTINUED | OUTPATIENT
Start: 2022-02-11 | End: 2022-02-25 | Stop reason: HOSPADM

## 2022-02-11 RX ORDER — VENLAFAXINE HYDROCHLORIDE 150 MG/1
150 CAPSULE, EXTENDED RELEASE ORAL DAILY
Status: DISCONTINUED | OUTPATIENT
Start: 2022-02-11 | End: 2022-02-25 | Stop reason: HOSPADM

## 2022-02-11 RX ORDER — IBUPROFEN 200 MG
24 TABLET ORAL
Status: DISCONTINUED | OUTPATIENT
Start: 2022-02-11 | End: 2022-02-25 | Stop reason: HOSPADM

## 2022-02-11 RX ORDER — SODIUM CHLORIDE 0.9 % (FLUSH) 0.9 %
10 SYRINGE (ML) INJECTION EVERY 8 HOURS PRN
Status: DISCONTINUED | OUTPATIENT
Start: 2022-02-11 | End: 2022-02-25 | Stop reason: HOSPADM

## 2022-02-11 RX ORDER — PROMETHAZINE HYDROCHLORIDE 25 MG/1
25 TABLET ORAL EVERY 6 HOURS PRN
Status: DISCONTINUED | OUTPATIENT
Start: 2022-02-11 | End: 2022-02-25 | Stop reason: HOSPADM

## 2022-02-11 RX ORDER — SODIUM CHLORIDE 9 MG/ML
INJECTION, SOLUTION INTRAVENOUS ONCE
Status: COMPLETED | OUTPATIENT
Start: 2022-02-12 | End: 2022-02-12

## 2022-02-11 RX ORDER — SEVELAMER CARBONATE 800 MG/1
800 TABLET, FILM COATED ORAL
Status: DISCONTINUED | OUTPATIENT
Start: 2022-02-11 | End: 2022-02-22

## 2022-02-11 RX ORDER — METHOCARBAMOL 750 MG/1
750 TABLET, FILM COATED ORAL 3 TIMES DAILY PRN
Status: DISCONTINUED | OUTPATIENT
Start: 2022-02-11 | End: 2022-02-25 | Stop reason: HOSPADM

## 2022-02-11 RX ORDER — IBUPROFEN 200 MG
16 TABLET ORAL
Status: DISCONTINUED | OUTPATIENT
Start: 2022-02-11 | End: 2022-02-25 | Stop reason: HOSPADM

## 2022-02-11 RX ORDER — LEVOTHYROXINE SODIUM 50 UG/1
50 TABLET ORAL
Status: DISCONTINUED | OUTPATIENT
Start: 2022-02-12 | End: 2022-02-25 | Stop reason: HOSPADM

## 2022-02-11 RX ORDER — TALC
6 POWDER (GRAM) TOPICAL NIGHTLY PRN
Status: DISCONTINUED | OUTPATIENT
Start: 2022-02-11 | End: 2022-02-25 | Stop reason: HOSPADM

## 2022-02-11 RX ORDER — NALOXONE HCL 0.4 MG/ML
0.02 VIAL (ML) INJECTION
Status: DISCONTINUED | OUTPATIENT
Start: 2022-02-11 | End: 2022-02-25 | Stop reason: HOSPADM

## 2022-02-11 RX ORDER — IPRATROPIUM BROMIDE AND ALBUTEROL SULFATE 2.5; .5 MG/3ML; MG/3ML
3 SOLUTION RESPIRATORY (INHALATION) EVERY 4 HOURS PRN
Status: DISCONTINUED | OUTPATIENT
Start: 2022-02-11 | End: 2022-02-16

## 2022-02-11 RX ORDER — ACETAMINOPHEN 325 MG/1
650 TABLET ORAL EVERY 4 HOURS PRN
Status: DISCONTINUED | OUTPATIENT
Start: 2022-02-11 | End: 2022-02-12

## 2022-02-11 RX ORDER — ANASTROZOLE 1 MG/1
1 TABLET ORAL DAILY
Status: DISCONTINUED | OUTPATIENT
Start: 2022-02-11 | End: 2022-02-25 | Stop reason: HOSPADM

## 2022-02-11 RX ORDER — TRAZODONE HYDROCHLORIDE 100 MG/1
100 TABLET ORAL NIGHTLY PRN
Status: DISCONTINUED | OUTPATIENT
Start: 2022-02-11 | End: 2022-02-25 | Stop reason: HOSPADM

## 2022-02-11 RX ORDER — GLUCAGON 1 MG
1 KIT INJECTION
Status: DISCONTINUED | OUTPATIENT
Start: 2022-02-11 | End: 2022-02-25 | Stop reason: HOSPADM

## 2022-02-11 RX ORDER — HEPARIN SODIUM 5000 [USP'U]/ML
5000 INJECTION, SOLUTION INTRAVENOUS; SUBCUTANEOUS EVERY 8 HOURS
Status: DISCONTINUED | OUTPATIENT
Start: 2022-02-11 | End: 2022-02-16

## 2022-02-11 RX ORDER — ONDANSETRON 8 MG/1
8 TABLET, ORALLY DISINTEGRATING ORAL EVERY 8 HOURS PRN
Status: DISCONTINUED | OUTPATIENT
Start: 2022-02-11 | End: 2022-02-25 | Stop reason: HOSPADM

## 2022-02-11 RX ORDER — CLONIDINE HYDROCHLORIDE 0.2 MG/1
0.2 TABLET ORAL 3 TIMES DAILY
Status: DISCONTINUED | OUTPATIENT
Start: 2022-02-11 | End: 2022-02-11

## 2022-02-11 RX ORDER — LANOLIN ALCOHOL/MO/W.PET/CERES
400 CREAM (GRAM) TOPICAL 2 TIMES DAILY
Status: DISCONTINUED | OUTPATIENT
Start: 2022-02-11 | End: 2022-02-18

## 2022-02-11 RX ORDER — MEROPENEM AND SODIUM CHLORIDE 1 G/50ML
1 INJECTION, SOLUTION INTRAVENOUS
Status: DISCONTINUED | OUTPATIENT
Start: 2022-02-11 | End: 2022-02-12

## 2022-02-11 RX ORDER — BUTALBITAL, ACETAMINOPHEN AND CAFFEINE 50; 325; 40 MG/1; MG/1; MG/1
1 TABLET ORAL DAILY PRN
Status: DISCONTINUED | OUTPATIENT
Start: 2022-02-11 | End: 2022-02-12

## 2022-02-11 RX ORDER — ATORVASTATIN CALCIUM 40 MG/1
80 TABLET, FILM COATED ORAL DAILY
Status: DISCONTINUED | OUTPATIENT
Start: 2022-02-11 | End: 2022-02-25 | Stop reason: HOSPADM

## 2022-02-11 RX ORDER — LANOLIN ALCOHOL/MO/W.PET/CERES
800 CREAM (GRAM) TOPICAL
Status: DISCONTINUED | OUTPATIENT
Start: 2022-02-11 | End: 2022-02-23

## 2022-02-11 RX ORDER — BISACODYL 10 MG
10 SUPPOSITORY, RECTAL RECTAL DAILY PRN
Status: DISCONTINUED | OUTPATIENT
Start: 2022-02-11 | End: 2022-02-25 | Stop reason: HOSPADM

## 2022-02-11 RX ORDER — MEROPENEM AND SODIUM CHLORIDE 500 MG/50ML
500 INJECTION, SOLUTION INTRAVENOUS
Status: DISCONTINUED | OUTPATIENT
Start: 2022-02-11 | End: 2022-02-11

## 2022-02-11 RX ORDER — INSULIN ASPART 100 [IU]/ML
4 INJECTION, SOLUTION INTRAVENOUS; SUBCUTANEOUS
Status: DISCONTINUED | OUTPATIENT
Start: 2022-02-11 | End: 2022-02-25 | Stop reason: HOSPADM

## 2022-02-11 RX ORDER — POLYETHYLENE GLYCOL 3350 17 G/17G
17 POWDER, FOR SOLUTION ORAL DAILY PRN
Status: DISCONTINUED | OUTPATIENT
Start: 2022-02-11 | End: 2022-02-25 | Stop reason: HOSPADM

## 2022-02-11 RX ORDER — OXYBUTYNIN CHLORIDE 10 MG/1
10 TABLET, EXTENDED RELEASE ORAL DAILY
Status: DISCONTINUED | OUTPATIENT
Start: 2022-02-11 | End: 2022-02-25 | Stop reason: HOSPADM

## 2022-02-11 RX ADMIN — VENLAFAXINE HYDROCHLORIDE 150 MG: 150 CAPSULE, EXTENDED RELEASE ORAL at 02:02

## 2022-02-11 RX ADMIN — SUMATRIPTAN SUCCINATE 100 MG: 50 TABLET ORAL at 02:02

## 2022-02-11 RX ADMIN — VANCOMYCIN HYDROCHLORIDE 2000 MG: 10 INJECTION, POWDER, LYOPHILIZED, FOR SOLUTION INTRAVENOUS at 09:02

## 2022-02-11 RX ADMIN — Medication 400 MG: at 09:02

## 2022-02-11 RX ADMIN — SEVELAMER CARBONATE 800 MG: 800 TABLET, FILM COATED ORAL at 12:02

## 2022-02-11 RX ADMIN — APIXABAN 2.5 MG: 2.5 TABLET, FILM COATED ORAL at 09:02

## 2022-02-11 RX ADMIN — TRAZODONE HYDROCHLORIDE 100 MG: 100 TABLET ORAL at 09:02

## 2022-02-11 RX ADMIN — HEPARIN SODIUM 5000 UNITS: 5000 INJECTION INTRAVENOUS; SUBCUTANEOUS at 10:02

## 2022-02-11 RX ADMIN — MEROPENEM AND SODIUM CHLORIDE 1 G: 1 INJECTION, SOLUTION INTRAVENOUS at 09:02

## 2022-02-11 RX ADMIN — Medication 400 MG: at 12:02

## 2022-02-11 RX ADMIN — ANASTROZOLE 1 MG: 1 TABLET, COATED ORAL at 01:02

## 2022-02-11 RX ADMIN — INSULIN DETEMIR 13 UNITS: 100 INJECTION, SOLUTION SUBCUTANEOUS at 12:02

## 2022-02-11 RX ADMIN — SODIUM CHLORIDE 500 ML: 0.9 INJECTION, SOLUTION INTRAVENOUS at 12:02

## 2022-02-11 RX ADMIN — SEVELAMER CARBONATE 800 MG: 800 TABLET, FILM COATED ORAL at 05:02

## 2022-02-11 RX ADMIN — MEROPENEM AND SODIUM CHLORIDE 1 G: 1 INJECTION, SOLUTION INTRAVENOUS at 12:02

## 2022-02-11 RX ADMIN — BUTALBITAL, ACETAMINOPHEN, AND CAFFEINE 1 TABLET: 50; 325; 40 TABLET ORAL at 09:02

## 2022-02-11 RX ADMIN — ATORVASTATIN CALCIUM 80 MG: 40 TABLET, FILM COATED ORAL at 12:02

## 2022-02-11 RX ADMIN — FAMOTIDINE 20 MG: 20 TABLET ORAL at 12:02

## 2022-02-11 RX ADMIN — INSULIN ASPART 4 UNITS: 100 INJECTION, SOLUTION INTRAVENOUS; SUBCUTANEOUS at 05:02

## 2022-02-11 RX ADMIN — OXYBUTYNIN CHLORIDE 10 MG: 10 TABLET, EXTENDED RELEASE ORAL at 01:02

## 2022-02-11 RX ADMIN — HEPARIN SODIUM 5000 UNITS: 5000 INJECTION INTRAVENOUS; SUBCUTANEOUS at 02:02

## 2022-02-11 NOTE — ED NOTES
Pt changed into fresh gown and linens and moved to hospital bed. While changing patient, she was covered in stool. Pt cleaned. Barrier cream applied to sacrum and buttocks.Sacral dressing applied.

## 2022-02-11 NOTE — ASSESSMENT & PLAN NOTE
- present on admission  - do not appear infection on exam  - wound care consulted, appreciate recs  - turn patient q2h

## 2022-02-11 NOTE — ASSESSMENT & PLAN NOTE
- SIRS: 0/4; hypotension  - Hx of ESBL UTI; completed course of meropenem  - UA with 1+ leuks, 28 RBCs, >100 WBCs  - UCx pending  - Given Vanc in ED  - suprapubic catheter changed in the ED  - Will continue Vanc and add Meropenem given last UCx  - IVF

## 2022-02-11 NOTE — PROGRESS NOTES
Pharmacokinetic Initial Assessment: IV Vancomycin    Assessment/Plan:    · Initiate intravenous vancomycin with loading dose of 2000 mg once in ED with subsequent doses when random concentrations are less than 20 mcg/mL  · Patient has CKD-4, current Cr at baseline, nephrology consulted fro possible HD in setting of ESBL UTI. Will pulse dose for now.  · Desired empiric serum trough concentration is 15 to 20 mcg/mL  · Draw vancomycin random level on 02/12/22 at 0400 with morning labs.  · Pharmacy will continue to follow and monitor vancomycin.      Please contact pharmacy at extension [phone] with any questions regarding this assessment.     Thank you for the consult,   Tita Garcia       Patient brief summary:  Cecile Bowen is a 71 y.o. female initiated on antimicrobial therapy with IV Vancomycin for treatment of suspected urinary tract infection    Drug Allergies:   Review of patient's allergies indicates:  No Known Allergies    Actual Body Weight:   105.2 kg    Renal Function:   Estimated Creatinine Clearance: 26.8 mL/min (A) (based on SCr of 2.3 mg/dL (H)).,     Dialysis Method (if applicable):  N/A    CBC (last 72 hours):  Recent Labs   Lab Result Units 02/09/22  0000 02/11/22  0849   WBC K/uL 9.93 8.06   Hemoglobin g/dL 9.2* 8.5*   Hemoglobin x 3 % 27.6*  --    Hematocrit % 28.9* 27.3*   Platelets K/uL 434* 377   Gran % %  --  52.0   Lymph % % 16.8* 27.3   Mono % % 7.2 11.7   Eosinophil % % 6.7 7.2   Basophil % % 0.3 0.9   Differential Method   --  Automated       Metabolic Panel (last 72 hours):  Recent Labs   Lab Result Units 02/09/22  0000 02/11/22  0849 02/11/22  0930   Sodium mmol/L 132* 131*  --    Potassium mmol/L 4.8 3.9  --    Chloride mmol/L  --  92*  --    CO2 mmol/L  --  26  --    Glucose mg/dL  --  224*  --    Glucose, UA   --   --  Negative   BUN mg/dL  --  36*  --    Creatinine mg/dL 2.66* 2.3*  --    Albumin g/dL 3.3* 2.3*  --    Total Bilirubin mg/dL  --  0.3  --    Alkaline Phosphatase  U/L 130* 110  --    AST U/L  --  19  --    ALT U/L 16 16  --    Phosphorus mg/dL 7.8*  --   --        Drug levels (last 3 results):  No results for input(s): VANCOMYCINRA, VANCOMYCINPE, VANCOMYCINTR in the last 72 hours.    Microbiologic Results:  Microbiology Results (last 7 days)     Procedure Component Value Units Date/Time    Urine culture [[id number]] Collected: 02/11/22 0930    Order Status: No result Specimen: Urine Updated: 02/11/22 1007    Blood culture x two cultures. Draw prior to antibiotics. [[id number]] Collected: 02/11/22 0919    Order Status: Sent Specimen: Blood from Peripheral, Hand, Right Updated: 02/11/22 0927    Blood culture x two cultures. Draw prior to antibiotics. [[id number]] Collected: 02/11/22 0849    Order Status: Sent Specimen: Blood from Peripheral, Antecubital, Right Updated: 02/11/22 0900

## 2022-02-11 NOTE — HPI
The patient is a 70 yo F with a history of HTN, HLD, hx of ESBL UTI, ESRD on HD admitted to observation for hypotension. The patient arrived to her dialysis clinic today and was found to be hypotensive in the 70s. Her treatment was canceled and she was instructed to come to the ED for evaluation and treatment. She was also noted to have purulent output in her catheter bag which was changed on arrival to the ED. She was recently hospitalized for ESBL UTI from 1/21-2/3. She currently denies lightheadedness, dizziness, SOB, fatigue, weakness, HA, CP, cough, abdominal pain, n/v. In the ED, BP 94/24 improved to 102/57. Remaining VSS. WBC 8.06. Lactic 1.8. Procal 0.12. Hgb 8.5 (~BL). . Cr 2.3 (~BL). Glucose 224. UA with 1+ leuks, 28 RBCs, >100 WBCs. CXR with no acute processes. Given Vanc 2g x 1.   The patient was initiated on RRT on 12/15/21 due to severe acidosis and hyperkalemia. She has been dialysis dependent since. She was last dialyze on Wednesday with no issue. She has a R CVC. Nephrology consulted for HD dependent ZAK.

## 2022-02-11 NOTE — ASSESSMENT & PLAN NOTE
- hold home clonidine, Lopressor, Lasix and lisinopril for now in setting of hypotension  - restart when appropriate

## 2022-02-11 NOTE — HPI
Cecile Bowen is a 71 y.o. female with PMHx significant for HTN, HLD, hx of ESBL UTI, ESRD on HD admitted to observation for hypotension, found to have a UTI. Patient was at her dialysis clinic today when she was found to have a BP of 76/43 and advised to come to the ED for evaluation. Reports her SBPs usually run in the 120s. Endorses cloudy urine in her catheter bag for the past few days, and reports it was last changed about a week ago. Denies lightheadedness, dizziness, SOB, fatigue, weakness, HA, CP, cough, abdominal pain, n/v. Patient was recently hospitalized for a ESBL UTI for which she completed her course of meropenem. Of note, patient also has a right subclavian dialysis access that was also changed about a week ago.     In the ED, BP 94/24 improved to 102/57. Remaining VSS. WBC 8.06. Lactic 1.8. Procal 0.12. Hgb 8.5 (~BL). . Cr 2.3 (~BL). Glucose 224. UA with 1+ leuks, 28 RBCs, >100 WBCs. CXR with no acute processes. Given Vanc 2g x 1.

## 2022-02-11 NOTE — ASSESSMENT & PLAN NOTE
- uncontrolled on admit  - started on Levemir 13 units daily, Novolog 4 units TIDW, SSI  - up titrate as needed  - POCT glucose ACHS  - hypoglycemic protocol  Hemoglobin A1C   Date Value Ref Range Status   01/03/2022 5.4 4.8 - 5.9 % Final   12/21/2021 5.3 4.0 - 5.6 % Final     Comment:     ADA Screening Guidelines:  5.7-6.4%  Consistent with prediabetes  >or=6.5%  Consistent with diabetes    High levels of fetal hemoglobin interfere with the HbA1C  assay. Heterozygous hemoglobin variants (HbS, HgC, etc)do  not significantly interfere with this assay.   However, presence of multiple variants may affect accuracy.     09/05/2021 6.6 (H) 4.0 - 5.6 % Final     Comment:     ADA Screening Guidelines:  5.7-6.4%  Consistent with prediabetes  >or=6.5%  Consistent with diabetes    High levels of fetal hemoglobin interfere with the HbA1C  assay. Heterozygous hemoglobin variants (HbS, HgC, etc)do  not significantly interfere with this assay.   However, presence of multiple variants may affect accuracy.

## 2022-02-11 NOTE — ED TRIAGE NOTES
Pt presents from dialysis with c/o UTI (pt has a suprapubic catheter that was changed last week). Purulent output noted in catheter tubing. Purulent discharge noted at suprapubic opening. Pt became hypotensive prior to dialysis and was sent to the ED for evaluation. Pt is awake, alert and oriented x 3.

## 2022-02-11 NOTE — CONSULTS
[facility] - Emergency Dept  Nephrology  Consult Note    Patient Name: Cecile Bowen  MRN: [id number]  Admission Date: 2/11/2022  Hospital Length of Stay: 0 days  Attending Provider: Adia Dewey MD   Primary Care Physician: Kailey Navarro MD  Principal Problem:Acute cystitis with hematuria    Inpatient consult to Nephrology  Consult performed by: Mayra Porter, MUNA, FNP-C  Consult ordered by: VENUS LiaoC  Reason for consult: HD dependent ZAK        Subjective:     HPI: The patient is a 70 yo F with a history of HTN, HLD, hx of ESBL UTI, ESRD on HD admitted to observation for hypotension. The patient arrived to her dialysis clinic today and was found to be hypotensive in the 70s. Her treatment was canceled and she was instructed to come to the ED for evaluation and treatment. She was also noted to have purulent output in her catheter bag which was changed on arrival to the ED. She was recently hospitalized for ESBL UTI from 1/21-2/3. She currently denies lightheadedness, dizziness, SOB, fatigue, weakness, HA, CP, cough, abdominal pain, n/v. In the ED, BP 94/24 improved to 102/57. Remaining VSS. WBC 8.06. Lactic 1.8. Procal 0.12. Hgb 8.5 (~BL). . Cr 2.3 (~BL). Glucose 224. UA with 1+ leuks, 28 RBCs, >100 WBCs. CXR with no acute processes. Given Vanc 2g x 1.   The patient was initiated on RRT on 12/15/21 due to severe acidosis and hyperkalemia. She has been dialysis dependent since. She was last dialyze on Wednesday with no issue. She has a R CVC. Nephrology consulted for HD dependent ZAK.            Past Medical History:   Diagnosis Date    Cervicogenic migraine     Chronic pain     CKD (chronic kidney disease) stage 4, GFR 15-29 ml/min     [facility]    CKD (chronic kidney disease) stage 4, GFR 15-29 ml/min     Diabetes mellitus     Long term use of Insulin, Diabetic Neuropathy    Fibromyalgia     Hydronephrosis     Hyperlipidemia     Hypertension 12/12/2012     Hypothyroidism 12/12/2012    ARON (iron deficiency anemia)     Insomnia     Levoscoliosis     Malignant neoplasm of upper-outer quadrant of left breast in female, estrogen receptor positive     Metabolic acidosis     Mobility impaired     Nuclear sclerosis - Both Eyes 3/24/2014    VIJAYA (obstructive sleep apnea)     Osteopenia     Pulmonary nodule     Recurrent UTI     Renal manifestation of secondary diabetes mellitus     Secondary hyperparathyroidism, renal     Urinary retention        Past Surgical History:   Procedure Laterality Date    BREAST BIOPSY Right     benign    CHOLECYSTECTOMY      COLONOSCOPY N/A 1/13/2017    Procedure: COLONOSCOPY;  Surgeon: Morris Wiseman MD;  Location: [facility] ENDO (4TH FLR);  Service: Endoscopy;  Laterality: N/A;  Renal pt Nephrology advised to avoid phosphate preps    CYSTOSCOPY N/A 10/8/2018    Procedure: CYSTOSCOPY;  Surgeon: Ronel Whittington MD;  [facility];  Service: Urology;  Laterality: N/A;  45 min    CYSTOSCOPY N/A 3/25/2019    Procedure: CYSTOSCOPY;  Surgeon: Ronel Whittington MD;  [facility];  Service: Urology;  Laterality: N/A;  45 min    CYSTOSCOPY N/A 8/26/2019    Procedure: CYSTOSCOPY;  Surgeon: Ronel Whittington MD;  [facility];  Service: Urology;  Laterality: N/A;  45 min    CYSTOSCOPY N/A 7/2/2021    Procedure: CYSTOSCOPY;  Surgeon: Ronel Whittington MD;  [facility];  Service: Urology;  Laterality: N/A;    CYSTOSCOPY  12/23/2021    Procedure: CYSTOSCOPY;  Surgeon: Ronel Whittington MD;  [facility];  Service: Urology;;    CYSTOSCOPY W/ URETERAL STENT PLACEMENT Left 5/15/2021    Procedure: CYSTOSCOPY, WITH URETERAL STENT INSERTION;  Surgeon: Levy Sánchez Jr., MD;  [facility];  Service: Urology;  Laterality: Left;    CYSTOSCOPY WITH BIOPSY OF BLADDER N/A 1/27/2020    Procedure: CYSTOSCOPY, WITH BLADDER BIOPSY, WITH FULGURATION  IF INDICATED;  Surgeon: Ronel Whittington MD;  Location: [facility] OR [address] [place] [address];  Service: Urology;  Laterality: N/A;    DILATION AND CURETTAGE OF UTERUS      GALLBLADDER SURGERY  2006    HYSTERECTOMY      INJECTION FOR SENTINEL NODE IDENTIFICATION Left 8/1/2019    Procedure: INJECTION, FOR SENTINEL NODE IDENTIFICATION;  Surgeon: Samia Fulton MD;  Location: [facility] OR [address] [place] [address];  Service: General;  Laterality: Left;    INJECTION OF BOTULINUM TOXIN TYPE A N/A 10/8/2018    Procedure: INJECTION, BOTULINUM TOXIN, TYPE A 300 UNITS;  Surgeon: Ronel Whittington MD;  Location: [facility] OR [address] [place] [address];  Service: Urology;  Laterality: N/A;    INJECTION OF BOTULINUM TOXIN TYPE A N/A 3/25/2019    Procedure: INJECTION, BOTULINUM TOXIN, TYPE A 300 UNITS;  Surgeon: Ronel Whittington MD;  Location: [address];  Service: Urology;  Laterality: N/A;    INJECTION OF BOTULINUM TOXIN TYPE A N/A 8/26/2019    Procedure: INJECTION, BOTULINUM TOXIN, TYPE A 300 UNITS;  Surgeon: Ronel Whittington MD;  Location: [address];  Service: Urology;  Laterality: N/A;    MASTECTOMY Left 8/1/2019    Procedure: MASTECTOMY 23 hour stay;  Surgeon: Samia Fulton MD;  Location: [address];  Service: General;  Laterality: Left;    OVARIAN CYST SURGERY  1985    REPLACEMENT OF STENT Left 12/23/2021    Procedure: REPLACEMENT, STENT;  Surgeon: Ronel Whittington MD;  Location: [address];  Service: Urology;  Laterality: Left;    SENTINEL LYMPH NODE BIOPSY Left 8/1/2019    Procedure: BIOPSY, LYMPH NODE, SENTINEL;  Surgeon: Samia Fulton MD;  Location: [address];  Service: General;  Laterality: Left;    spt placement      TONSILLECTOMY, ADENOIDECTOMY      TOTAL ABDOMINAL HYSTERECTOMY W/ BILATERAL SALPINGOOPHORECTOMY  1985       Review of patient's allergies indicates:  No Known Allergies  Current Facility-Administered Medications   Medication Frequency    [START ON 2/12/2022] 0.9%  NaCl infusion Once     "acetaminophen tablet 650 mg Q4H PRN    albuterol-ipratropium 2.5 mg-0.5 mg/3 mL nebulizer solution 3 mL Q4H PRN    anastrozole tablet 1 mg Daily    apixaban tablet 2.5 mg BID    atorvastatin tablet 80 mg Daily    bisacodyL suppository 10 mg Daily PRN    butalbital-acetaminophen-caffeine -40 mg per tablet 1 tablet Daily PRN    darbepoetin chloe-polysorbate injection 50 mcg Q7 Days    dextrose 10% bolus 125 mL PRN    dextrose 10% bolus 250 mL PRN    famotidine tablet 20 mg Daily    glucagon (human recombinant) injection 1 mg PRN    glucose chewable tablet 16 g PRN    glucose chewable tablet 24 g PRN    heparin (porcine) injection 5,000 Units Q8H    insulin aspart U-100 pen 0-5 Units QID (AC + HS) PRN    insulin aspart U-100 pen 4 Units TIDWM    insulin detemir U-100 pen 13 Units Daily    [START ON 2/12/2022] levothyroxine tablet 50 mcg Before breakfast    magnesium oxide tablet 400 mg BID    magnesium oxide tablet 800 mg PRN    magnesium oxide tablet 800 mg PRN    melatonin tablet 6 mg Nightly PRN    meropenem-0.9% sodium chloride 1 g/50 mL IVPB Q8H    methocarbamoL tablet 750 mg TID PRN    naloxone 0.4 mg/mL injection 0.02 mg PRN    ondansetron disintegrating tablet 8 mg Q8H PRN    oxybutynin 24 hr tablet 10 mg Daily    polyethylene glycol packet 17 g Daily PRN    promethazine tablet 25 mg Q6H PRN    sevelamer carbonate tablet 800 mg TID WM    sodium chloride 0.9% bolus 500 mL Once    sodium chloride 0.9% flush 10 mL Q8H PRN    sumatriptan tablet 100 mg BID PRN    traZODone tablet 100 mg Nightly PRN    vancomycin - pharmacy to dose pharmacy to manage frequency    venlafaxine 24 hr capsule 150 mg Daily     Current Outpatient Medications   Medication    albuterol (PROVENTIL HFA) 90 mcg/actuation inhaler    anastrozole (ARIMIDEX) 1 mg Tab    atorvastatin (LIPITOR) 80 MG tablet    BD INSULIN SYRINGE ULTRA-FINE 0.5 mL 31 gauge x 5/16" Syrg    blood sugar diagnostic Strp    " "blood sugar diagnostic Strp    blood-glucose meter kit    butalbital-acetaminophen-caffeine -40 mg (FIORICET, ESGIC) -40 mg per tablet    cloNIDine (CATAPRES) 0.2 MG tablet    ergocalciferol (ERGOCALCIFEROL) 50,000 unit Cap    famotidine (PEPCID) 20 MG tablet    furosemide (LASIX) 80 MG tablet    gemfibroziL (LOPID) 600 MG tablet    lancets Misc    lancets Misc    lisinopriL 10 MG tablet    magnesium oxide (MAG-OX) 400 mg (241.3 mg magnesium) tablet    melatonin (MELATIN) 3 mg tablet    methocarbamoL (ROBAXIN) 750 MG Tab    metoprolol tartrate (LOPRESSOR) 25 MG tablet    oxybutynin (DITROPAN-XL) 10 MG 24 hr tablet    pen needle, diabetic (BD ULTRA-FINE SHORT PEN NEEDLE) 31 gauge x 5/16" Ndle    polyethylene glycol (GLYCOLAX) 17 gram PwPk    sevelamer carbonate (RENVELA) 800 mg Tab    sumatriptan (IMITREX) 100 MG tablet    SYNTHROID 50 mcg tablet    traZODone (DESYREL) 100 MG tablet    venlafaxine (EFFEXOR-XR) 150 MG Cp24     Family History     Problem Relation (Age of Onset)    ALS Mother    Cancer Maternal Grandmother, Paternal Grandfather, Brother    Diabetes Sister, Maternal Aunt, Maternal Uncle    Kidney disease Sister, Maternal Aunt    Prostate cancer Brother    Scoliosis Brother        Tobacco Use    Smoking status: Never Smoker    Smokeless tobacco: Never Used   Substance and Sexual Activity    Alcohol use: No     Alcohol/week: 0.0 standard drinks    Drug use: No    Sexual activity: Not Currently     Birth control/protection: Abstinence     Review of Systems   Constitutional: Negative for activity change, chills and fever.   HENT: Negative for hearing loss and trouble swallowing.    Eyes: Negative for visual disturbance.   Respiratory: Negative for cough, chest tightness and shortness of breath.    Cardiovascular: Negative for chest pain, palpitations and leg swelling.   Gastrointestinal: Negative for abdominal pain, constipation, diarrhea, nausea and vomiting. "   Genitourinary: Negative for dysuria, frequency and hematuria.   Musculoskeletal: Positive for gait problem. Negative for back pain and neck pain.   Skin: Negative for rash and wound.   Neurological: Negative for dizziness, syncope, speech difficulty and light-headedness.   Psychiatric/Behavioral: Negative for agitation, behavioral problems, confusion and decreased concentration. The patient is not nervous/anxious.      Objective:     Vital Signs (Most Recent):  Temp: 97.8 °F (36.6 °C) (02/11/22 0815)  Pulse: 62 (02/11/22 1400)  Resp: 17 (02/11/22 1400)  BP: 104/62 (02/11/22 1300)  SpO2: 96 % (02/11/22 1400)  O2 Device (Oxygen Therapy): room air (02/11/22 0944) Vital Signs (24h Range):  Temp:  [97.8 °F (36.6 °C)-97.9 °F (36.6 °C)] 97.8 °F (36.6 °C)  Pulse:  [58-66] 62  Resp:  [17-18] 17  SpO2:  [96 %-99 %] 96 %  BP: ()/(49-62) 104/62     Weight: 105.2 kg (232 lb) (02/11/22 0815)  Body mass index is 41.1 kg/m².  Body surface area is 2.16 meters squared.    No intake/output data recorded.    Physical Exam  Vitals and nursing note reviewed.   Constitutional:       General: She is not in acute distress.     Appearance: Normal appearance. She is obese. She is not ill-appearing.   HENT:      Head: Normocephalic and atraumatic.      Right Ear: External ear normal.      Left Ear: External ear normal.   Eyes:      Conjunctiva/sclera: Conjunctivae normal.   Cardiovascular:      Rate and Rhythm: Normal rate and regular rhythm.      Pulses: Normal pulses.      Heart sounds: Normal heart sounds. No murmur heard.      Pulmonary:      Effort: Pulmonary effort is normal. No respiratory distress.      Breath sounds: Normal breath sounds.   Abdominal:      General: Abdomen is flat. Bowel sounds are normal.      Palpations: Abdomen is soft.      Tenderness: There is no abdominal tenderness.   Genitourinary:     Comments: suprapubic catheter in place  Musculoskeletal:         General: Normal range of motion.      Cervical back:  Normal range of motion and neck supple. No tenderness.      Right lower leg: No edema.      Left lower leg: No edema.   Skin:     General: Skin is warm and dry.      Capillary Refill: Capillary refill takes less than 2 seconds.      Coloration: Skin is not jaundiced.   Neurological:      General: No focal deficit present.      Mental Status: She is alert and oriented to person, place, and time. Mental status is at baseline.   Psychiatric:         Mood and Affect: Mood normal.         Behavior: Behavior normal.         Significant Labs:  CBC:   Recent Labs   Lab 02/11/22  0849   WBC 8.06   RBC 2.87*   HGB 8.5*   HCT 27.3*      MCV 95   MCH 29.6   MCHC 31.1*     CMP:   Recent Labs   Lab 02/11/22  0849   *   CALCIUM 9.3   ALBUMIN 2.3*   PROT 6.5   *   K 3.9   CO2 26   CL 92*   BUN 36*   CREATININE 2.3*   ALKPHOS 110   ALT 16   AST 19   BILITOT 0.3     All labs within the past 24 hours have been reviewed.        Assessment/Plan:     * Acute cystitis with hematuria  - defer to primary care team     CKD (chronic kidney disease) requiring chronic dialysis  70 yo F admitted with concern for sepsis due to UTI. UA with 1+ leuks, 28 RBCs, >100 WBCs.   ZAK on CKD IV secondary to septic shock. Dialysis dependent ZAK since 12/15/21. Essentially ESRD, failed HD weaned during last hospital admission (see notes).       ESRD on IHD MWF  Out patient HD Center - [facility] / Dr. Hurst   Duration of outpatient dialysis session - 3.5 hrs  EDW: 114 kg   Residual Renal Function (Urine Output) -   Access:R CVC    -No emergent need for clearance and volume management today. Will hold RRT given low BP earlier and concern for sepsis.   -Dialysis for metabolic clearance and volume management will be provided tomorrow AM.   -BFR: 400 mL/min  -Target ultrafiltration: 2-3 L as tolerated, keep MAP >65.  -Duration: 3.5 hrs   -Labs reviewed and dialysate to be adjusted to current labs.   -Pre and Post-HD weights to determine EDW.    -Monitor for complications and adverse events.  -Strict Input and Output and chart   -Daily weights.  -Avoid nephrotoxic medication and renal dose medications to GFR.  -Low NA, K, PO4 diet.  -Will evaluate HD requirements Daily  -Continue MWF iHD schedule while inpatient        Thank you for your consult. I will follow-up with patient. Please contact us if you have any additional questions.    Mayra Porter DNP, FNP-C  Nephrology  [facility] - Emergency Dept

## 2022-02-11 NOTE — SUBJECTIVE & OBJECTIVE
Past Medical History:   Diagnosis Date    Cervicogenic migraine     Chronic pain     CKD (chronic kidney disease) stage 4, GFR 15-29 ml/min     Dr Piedadmoody    CKD (chronic kidney disease) stage 4, GFR 15-29 ml/min     Diabetes mellitus     Long term use of Insulin, Diabetic Neuropathy    Fibromyalgia     Hydronephrosis     Hyperlipidemia     Hypertension 12/12/2012    Hypothyroidism 12/12/2012    ARON (iron deficiency anemia)     Insomnia     Levoscoliosis     Malignant neoplasm of upper-outer quadrant of left breast in female, estrogen receptor positive     Metabolic acidosis     Mobility impaired     Nuclear sclerosis - Both Eyes 3/24/2014    VIJAYA (obstructive sleep apnea)     Osteopenia     Pulmonary nodule     Recurrent UTI     Renal manifestation of secondary diabetes mellitus     Secondary hyperparathyroidism, renal     Urinary retention        Past Surgical History:   Procedure Laterality Date    BREAST BIOPSY Right     benign    CHOLECYSTECTOMY      COLONOSCOPY N/A 1/13/2017    Procedure: COLONOSCOPY;  Surgeon: Morris Wiseman MD;  Location: [facility] (4TH FLR);  Service: Endoscopy;  Laterality: N/A;  Renal pt Nephrology advised to avoid phosphate preps    CYSTOSCOPY N/A 10/8/2018    Procedure: CYSTOSCOPY;  Surgeon: Ronel Whittington MD;  [facility];  Service: Urology;  Laterality: N/A;  45 min    CYSTOSCOPY N/A 3/25/2019    Procedure: CYSTOSCOPY;  Surgeon: Ronel Whittington MD;  [place] [facility] [place] [place] [address] [place] [address];  Service: Urology;  Laterality: N/A;  45 min    CYSTOSCOPY N/A 8/26/2019    Procedure: CYSTOSCOPY;  Surgeon: Ronel Whittington MD;  Location: [address];  Service: Urology;  Laterality: N/A;  45 min    CYSTOSCOPY N/A 7/2/2021    Procedure: CYSTOSCOPY;  Surgeon: Ronel Whittington MD;  [place] [facility] [place] [address] [place] [address];  Service: Urology;  Laterality: N/A;    CYSTOSCOPY  12/23/2021    Procedure: CYSTOSCOPY;  Surgeon: Ronel Whittington MD;   Location: [address];  Service: Urology;;    CYSTOSCOPY W/ URETERAL STENT PLACEMENT Left 5/15/2021    Procedure: CYSTOSCOPY, WITH URETERAL STENT INSERTION;  Surgeon: Levy Sánchez Jr., MD;  Location: [address];  Service: Urology;  Laterality: Left;    CYSTOSCOPY WITH BIOPSY OF BLADDER N/A 1/27/2020    Procedure: CYSTOSCOPY, WITH BLADDER BIOPSY, WITH FULGURATION IF INDICATED;  Surgeon: Ronel Whittington MD;  Location: [address];  Service: Urology;  Laterality: N/A;    DILATION AND CURETTAGE OF UTERUS      GALLBLADDER SURGERY  2006    HYSTERECTOMY      INJECTION FOR SENTINEL NODE IDENTIFICATION Left 8/1/2019    Procedure: INJECTION, FOR SENTINEL NODE IDENTIFICATION;  Surgeon: Samia [place] MD;  Location: [address];  Service: General;  Laterality: Left;    INJECTION OF BOTULINUM TOXIN TYPE A N/A 10/8/2018    Procedure: INJECTION, BOTULINUM TOXIN, TYPE A 300 UNITS;  Surgeon: Ronel Whittington MD;  Location: [address];  Service: Urology;  Laterality: N/A;    INJECTION OF BOTULINUM TOXIN TYPE A N/A 3/25/2019    Procedure: INJECTION, BOTULINUM TOXIN, TYPE A 300 UNITS;  Surgeon: Ronel Whittington MD;  Location: [address];  Service: Urology;  Laterality: N/A;    INJECTION OF BOTULINUM TOXIN TYPE A N/A 8/26/2019    Procedure: INJECTION, BOTULINUM TOXIN, TYPE A 300 UNITS;  Surgeon: Ronel Whittington MD;  Location: [address];  Service: Urology;  Laterality: N/A;    MASTECTOMY Left 8/1/2019    Procedure: MASTECTOMY 23 hour stay;  Surgeon: Samia [place] MD;  Location: [address];  Service: General;  Laterality: Left;    OVARIAN CYST SURGERY  1985    REPLACEMENT OF STENT Left 12/23/2021    Procedure: REPLACEMENT, STENT;  Surgeon: Ronel Whittington MD;  Location: [address];  Service: Urology;  Laterality: Left;    SENTINEL LYMPH NODE BIOPSY Left 8/1/2019    Procedure: BIOPSY, LYMPH NODE, SENTINEL;  Surgeon: Samia [place] MD;  Location:  [facility] OR 2ND FLR;  Service: General;  Laterality: Left;    spt placement      TONSILLECTOMY, ADENOIDECTOMY      TOTAL ABDOMINAL HYSTERECTOMY W/ BILATERAL SALPINGOOPHORECTOMY  1985       Review of patient's allergies indicates:  No Known Allergies  Current Facility-Administered Medications   Medication Frequency    [START ON 2/12/2022] 0.9%  NaCl infusion Once    acetaminophen tablet 650 mg Q4H PRN    albuterol-ipratropium 2.5 mg-0.5 mg/3 mL nebulizer solution 3 mL Q4H PRN    anastrozole tablet 1 mg Daily    apixaban tablet 2.5 mg BID    atorvastatin tablet 80 mg Daily    bisacodyL suppository 10 mg Daily PRN    butalbital-acetaminophen-caffeine -40 mg per tablet 1 tablet Daily PRN    darbepoetin chloe-polysorbate injection 50 mcg Q7 Days    dextrose 10% bolus 125 mL PRN    dextrose 10% bolus 250 mL PRN    famotidine tablet 20 mg Daily    glucagon (human recombinant) injection 1 mg PRN    glucose chewable tablet 16 g PRN    glucose chewable tablet 24 g PRN    heparin (porcine) injection 5,000 Units Q8H    insulin aspart U-100 pen 0-5 Units QID (AC + HS) PRN    insulin aspart U-100 pen 4 Units TIDWM    insulin detemir U-100 pen 13 Units Daily    [START ON 2/12/2022] levothyroxine tablet 50 mcg Before breakfast    magnesium oxide tablet 400 mg BID    magnesium oxide tablet 800 mg PRN    magnesium oxide tablet 800 mg PRN    melatonin tablet 6 mg Nightly PRN    meropenem-0.9% sodium chloride 1 g/50 mL IVPB Q8H    methocarbamoL tablet 750 mg TID PRN    naloxone 0.4 mg/mL injection 0.02 mg PRN    ondansetron disintegrating tablet 8 mg Q8H PRN    oxybutynin 24 hr tablet 10 mg Daily    polyethylene glycol packet 17 g Daily PRN    promethazine tablet 25 mg Q6H PRN    sevelamer carbonate tablet 800 mg TID WM    sodium chloride 0.9% bolus 500 mL Once    sodium chloride 0.9% flush 10 mL Q8H PRN    sumatriptan tablet 100 mg BID PRN    traZODone tablet 100 mg Nightly PRN    vancomycin -  "pharmacy to dose pharmacy to manage frequency    venlafaxine 24 hr capsule 150 mg Daily     Current Outpatient Medications   Medication    albuterol (PROVENTIL HFA) 90 mcg/actuation inhaler    anastrozole (ARIMIDEX) 1 mg Tab    atorvastatin (LIPITOR) 80 MG tablet    BD INSULIN SYRINGE ULTRA-FINE 0.5 mL 31 gauge x 5/16" Syrg    blood sugar diagnostic Strp    blood sugar diagnostic Strp    blood-glucose meter kit    butalbital-acetaminophen-caffeine -40 mg (FIORICET, ESGIC) -40 mg per tablet    cloNIDine (CATAPRES) 0.2 MG tablet    ergocalciferol (ERGOCALCIFEROL) 50,000 unit Cap    famotidine (PEPCID) 20 MG tablet    furosemide (LASIX) 80 MG tablet    gemfibroziL (LOPID) 600 MG tablet    lancets Misc    lancets Misc    lisinopriL 10 MG tablet    magnesium oxide (MAG-OX) 400 mg (241.3 mg magnesium) tablet    melatonin (MELATIN) 3 mg tablet    methocarbamoL (ROBAXIN) 750 MG Tab    metoprolol tartrate (LOPRESSOR) 25 MG tablet    oxybutynin (DITROPAN-XL) 10 MG 24 hr tablet    pen needle, diabetic (BD ULTRA-FINE SHORT PEN NEEDLE) 31 gauge x 5/16" Ndle    polyethylene glycol (GLYCOLAX) 17 gram PwPk    sevelamer carbonate (RENVELA) 800 mg Tab    sumatriptan (IMITREX) 100 MG tablet    SYNTHROID 50 mcg tablet    traZODone (DESYREL) 100 MG tablet    venlafaxine (EFFEXOR-XR) 150 MG Cp24     Family History     Problem Relation (Age of Onset)    ALS Mother    Cancer Maternal Grandmother, Paternal Grandfather, Brother    Diabetes Sister, Maternal Aunt, Maternal Uncle    Kidney disease Sister, Maternal Aunt    Prostate cancer Brother    Scoliosis Brother        Tobacco Use    Smoking status: Never Smoker    Smokeless tobacco: Never Used   Substance and Sexual Activity    Alcohol use: No     Alcohol/week: 0.0 standard drinks    Drug use: No    Sexual activity: Not Currently     Birth control/protection: Abstinence     Review of Systems   Constitutional: Negative for activity change, " chills and fever.   HENT: Negative for hearing loss and trouble swallowing.    Eyes: Negative for visual disturbance.   Respiratory: Negative for cough, chest tightness and shortness of breath.    Cardiovascular: Negative for chest pain, palpitations and leg swelling.   Gastrointestinal: Negative for abdominal pain, constipation, diarrhea, nausea and vomiting.   Genitourinary: Negative for dysuria, frequency and hematuria.   Musculoskeletal: Positive for gait problem. Negative for back pain and neck pain.   Skin: Negative for rash and wound.   Neurological: Negative for dizziness, syncope, speech difficulty and light-headedness.   Psychiatric/Behavioral: Negative for agitation, behavioral problems, confusion and decreased concentration. The patient is not nervous/anxious.      Objective:     Vital Signs (Most Recent):  Temp: 97.8 °F (36.6 °C) (02/11/22 0815)  Pulse: 62 (02/11/22 1400)  Resp: 17 (02/11/22 1400)  BP: 104/62 (02/11/22 1300)  SpO2: 96 % (02/11/22 1400)  O2 Device (Oxygen Therapy): room air (02/11/22 0944) Vital Signs (24h Range):  Temp:  [97.8 °F (36.6 °C)-97.9 °F (36.6 °C)] 97.8 °F (36.6 °C)  Pulse:  [58-66] 62  Resp:  [17-18] 17  SpO2:  [96 %-99 %] 96 %  BP: ()/(49-62) 104/62     Weight: 105.2 kg (232 lb) (02/11/22 0815)  Body mass index is 41.1 kg/m².  Body surface area is 2.16 meters squared.    No intake/output data recorded.    Physical Exam  Vitals and nursing note reviewed.   Constitutional:       General: She is not in acute distress.     Appearance: Normal appearance. She is obese. She is not ill-appearing.   HENT:      Head: Normocephalic and atraumatic.      Right Ear: External ear normal.      Left Ear: External ear normal.   Eyes:      Conjunctiva/sclera: Conjunctivae normal.   Cardiovascular:      Rate and Rhythm: Normal rate and regular rhythm.      Pulses: Normal pulses.      Heart sounds: Normal heart sounds. No murmur heard.      Pulmonary:      Effort: Pulmonary effort is  normal. No respiratory distress.      Breath sounds: Normal breath sounds.   Abdominal:      General: Abdomen is flat. Bowel sounds are normal.      Palpations: Abdomen is soft.      Tenderness: There is no abdominal tenderness.   Genitourinary:     Comments: suprapubic catheter in place  Musculoskeletal:         General: Normal range of motion.      Cervical back: Normal range of motion and neck supple. No tenderness.      Right lower leg: No edema.      Left lower leg: No edema.   Skin:     General: Skin is warm and dry.      Capillary Refill: Capillary refill takes less than 2 seconds.      Coloration: Skin is not jaundiced.   Neurological:      General: No focal deficit present.      Mental Status: She is alert and oriented to person, place, and time. Mental status is at baseline.   Psychiatric:         Mood and Affect: Mood normal.         Behavior: Behavior normal.         Significant Labs:  CBC:   Recent Labs   Lab 02/11/22  0849   WBC 8.06   RBC 2.87*   HGB 8.5*   HCT 27.3*      MCV 95   MCH 29.6   MCHC 31.1*     CMP:   Recent Labs   Lab 02/11/22  0849   *   CALCIUM 9.3   ALBUMIN 2.3*   PROT 6.5   *   K 3.9   CO2 26   CL 92*   BUN 36*   CREATININE 2.3*   ALKPHOS 110   ALT 16   AST 19   BILITOT 0.3     All labs within the past 24 hours have been reviewed.

## 2022-02-11 NOTE — ED PROVIDER NOTES
Encounter Date: 2/11/2022       History     Chief Complaint   Patient presents with    Hypotension     Arrived via [facility] from dialysis center with c/o hypotension SBP 80s, sent for possible urosepsis, recent UTI diagnosis, did not receive dialysis today, last dialyzed wednesday       HPI     71-year-old woman with a history of hypertension, hyperlipidemia, end-stage renal disease on dialysis who presents from dialysis clinic with report of hypotension, blood pressure as low as the 76/43.  She reports that she normally runs normal in the 120s.  She is feeling like her normal self, no dizziness or lightheadedness, though she does not ambulate at baseline, cannot describe any orthostasis symptoms.  She was recently admitted for ESBL UTI, and has a suprapubic catheter.  She completed a course of meropenem.  There is currently cloudy yellow urine in her catheter bag, she reports it has been like that for a few days.  She denies fevers or chills.  It was last changed last week.  Her right subclavian dialysis access was also changed last week.  She denies chest pain, shortness of breath, any new wounds, or change to her baseline bilateral lower extremity edema.  Review of patient's allergies indicates:  No Known Allergies  Past Medical History:   Diagnosis Date    Cervicogenic migraine     Chronic pain     CKD (chronic kidney disease) stage 4, GFR 15-29 ml/min     [facility]    CKD (chronic kidney disease) stage 4, GFR 15-29 ml/min     Diabetes mellitus     Long term use of Insulin, Diabetic Neuropathy    Fibromyalgia     Hydronephrosis     Hyperlipidemia     Hypertension 12/12/2012    Hypothyroidism 12/12/2012    ARON (iron deficiency anemia)     Insomnia     Levoscoliosis     Malignant neoplasm of upper-outer quadrant of left breast in female, estrogen receptor positive     Metabolic acidosis     Mobility impaired     Nuclear sclerosis - Both Eyes 3/24/2014    VIJAYA (obstructive sleep apnea)      Osteopenia     Pulmonary nodule     Recurrent UTI     Renal manifestation of secondary diabetes mellitus     Secondary hyperparathyroidism, renal     Urinary retention      Past Surgical History:   Procedure Laterality Date    BREAST BIOPSY Right     benign    CHOLECYSTECTOMY      COLONOSCOPY N/A 1/13/2017    Procedure: COLONOSCOPY;  Surgeon: Morris Wiseman MD;  Location: [facility] ENDO (4TH FLR);  Service: Endoscopy;  Laterality: N/A;  Renal pt Nephrology advised to avoid phosphate preps    CYSTOSCOPY N/A 10/8/2018    Procedure: CYSTOSCOPY;  Surgeon: Ronel Whittington MD;  Location: [facility] OR 1ST FLR;  Service: Urology;  Laterality: N/A;  45 min    CYSTOSCOPY N/A 3/25/2019    Procedure: CYSTOSCOPY;  Surgeon: Ronel Whittington MD;  [facility];  Service: Urology;  Laterality: N/A;  45 min    CYSTOSCOPY N/A 8/26/2019    Procedure: CYSTOSCOPY;  Surgeon: Ronel Whittington MD;  [facility];  Service: Urology;  Laterality: N/A;  45 min    CYSTOSCOPY N/A 7/2/2021    Procedure: CYSTOSCOPY;  Surgeon: Ronel Whittington MD;  [facility];  Service: Urology;  Laterality: N/A;    CYSTOSCOPY  12/23/2021    Procedure: CYSTOSCOPY;  Surgeon: Ronel Whittington MD;  [facility];  Service: Urology;;    CYSTOSCOPY W/ URETERAL STENT PLACEMENT Left 5/15/2021    Procedure: CYSTOSCOPY, WITH URETERAL STENT INSERTION;  Surgeon: Levy Sánchez Jr., MD;  [facility];  Service: Urology;  Laterality: Left;    CYSTOSCOPY WITH BIOPSY OF BLADDER N/A 1/27/2020    Procedure: CYSTOSCOPY, WITH BLADDER BIOPSY, WITH FULGURATION IF INDICATED;  Surgeon: Ronel Whittington MD;  [facility];  Service: Urology;  Laterality: N/A;    DILATION AND CURETTAGE OF UTERUS      GALLBLADDER SURGERY  2006    HYSTERECTOMY      INJECTION FOR SENTINEL NODE IDENTIFICATION Left 8/1/2019    Procedure: INJECTION, FOR SENTINEL NODE IDENTIFICATION;   Surgeon: Samia Fulton MD;  Location: [facility] OR [address] [place] [address];  Service: General;  Laterality: Left;    INJECTION OF BOTULINUM TOXIN TYPE A N/A 10/8/2018    Procedure: INJECTION, BOTULINUM TOXIN, TYPE A 300 UNITS;  Surgeon: Ronel Whittington MD;  Location: [facility] OR [address] [place] [address];  Service: Urology;  Laterality: N/A;    INJECTION OF BOTULINUM TOXIN TYPE A N/A 3/25/2019    Procedure: INJECTION, BOTULINUM TOXIN, TYPE A 300 UNITS;  Surgeon: Ronel Whittington MD;  Location: [facility] OR [address] [place] [address];  Service: Urology;  Laterality: N/A;    INJECTION OF BOTULINUM TOXIN TYPE A N/A 8/26/2019    Procedure: INJECTION, BOTULINUM TOXIN, TYPE A 300 UNITS;  Surgeon: Ronel Whittington MD;  Location: [facility] OR [address] [place] [address];  Service: Urology;  Laterality: N/A;    MASTECTOMY Left 8/1/2019    Procedure: MASTECTOMY 23 hour stay;  Surgeon: Samia Fulton MD;  Location: [address];  Service: General;  Laterality: Left;    OVARIAN CYST SURGERY  1985    REPLACEMENT OF STENT Left 12/23/2021    Procedure: REPLACEMENT, STENT;  Surgeon: Ronel Whittington MD;  Location: [facility] OR [address] [place] [address];  Service: Urology;  Laterality: Left;    SENTINEL LYMPH NODE BIOPSY Left 8/1/2019    Procedure: BIOPSY, LYMPH NODE, SENTINEL;  Surgeon: Samia Fulton MD;  Location: [address];  Service: General;  Laterality: Left;    spt placement      TONSILLECTOMY, ADENOIDECTOMY      TOTAL ABDOMINAL HYSTERECTOMY W/ BILATERAL SALPINGOOPHORECTOMY  1985     Family History   Problem Relation Age of Onset    Diabetes Sister     Kidney disease Sister         CKD III    ALS Mother         d.    Cancer Maternal Grandmother         d. colon    Cancer Paternal Grandfather         d. lung    Scoliosis Brother         increased pain    Prostate cancer Brother         cured s/p surgery    Cancer Brother         rare cancer that got into the bones    Diabetes Maternal Aunt     Kidney disease Maternal Aunt     Diabetes Maternal Uncle     Amblyopia Neg Hx      Blindness Neg Hx     Cataracts Neg Hx     Glaucoma Neg Hx     Macular degeneration Neg Hx     Retinal detachment Neg Hx     Strabismus Neg Hx      Social History     Tobacco Use    Smoking status: Never Smoker    Smokeless tobacco: Never Used   Substance Use Topics    Alcohol use: No     Alcohol/week: 0.0 standard drinks    Drug use: No     Review of Systems   Constitutional: Negative for chills, diaphoresis, fatigue and fever.   HENT: Negative for congestion, rhinorrhea and sore throat.    Eyes: Negative for visual disturbance.   Respiratory: Negative for cough, chest tightness and shortness of breath.    Cardiovascular: Negative for chest pain.   Gastrointestinal: Negative for abdominal pain, blood in stool, constipation, diarrhea and vomiting.   Genitourinary: Negative for dysuria, hematuria and urgency.   Musculoskeletal: Negative for back pain.   Skin: Negative for rash.   Neurological: Negative for seizures and syncope.   Hematological: Does not bruise/bleed easily.   Psychiatric/Behavioral: Negative for agitation and hallucinations.       Physical Exam     Initial Vitals [02/11/22 0815]   BP Pulse Resp Temp SpO2   (!) 99/49 61 18 97.8 °F (36.6 °C) 98 %      MAP       --         Physical Exam  Gen: AxOx3, NAD, well nourished, appears chronically ill but nontoxic, stated age  Eye: EOMI, no scleral icterus, no periorbital edema or ecchymosis  Head: normocephalic, atraumatic, no lesions, scalp appears normal  ENT: neck supple, no stridor, no masses, no drooling or voice changes  CVS: RRR, no m/r/g, distal pulses intact/symmetric  Pulm: CTAB, no wheezes, rales or rhonchi, no increased work of breathing  Abd: soft, nontender, nondistended, no organomegaly, no CVAT, suprapubic site with slight purulence at stoma, there is cloudy whitish urine with significant sediment in her bag  Back: sacral ulceration with no obvious infection      Ext:  2+ bilateral lower extremity edema, no lesions, rashes, or  deformity  Neuro: GCS15, bed-bound at baseline, face grossly symmetric  Psych: normal affect, cooperative, well groomed, makes good eye contact    ED Course   Procedures  Labs Reviewed   CBC W/ AUTO DIFFERENTIAL - Abnormal; Notable for the following components:       Result Value    RBC 2.87 (*)     Hemoglobin 8.5 (*)     Hematocrit 27.3 (*)     MCHC 31.1 (*)     RDW 15.5 (*)     Immature Granulocytes 0.9 (*)     Immature Grans (Abs) 0.07 (*)     Eos # 0.6 (*)     All other components within normal limits   COMPREHENSIVE METABOLIC PANEL - Abnormal; Notable for the following components:    Sodium 131 (*)     Chloride 92 (*)     Glucose 224 (*)     BUN 36 (*)     Creatinine 2.3 (*)     Albumin 2.3 (*)     eGFR if  24.3 (*)     eGFR if non  21.1 (*)     All other components within normal limits   URINALYSIS, REFLEX TO URINE CULTURE - Abnormal; Notable for the following components:    Appearance, UA Cloudy (*)     Protein, UA 2+ (*)     Occult Blood UA 1+ (*)     Leukocytes, UA 1+ (*)     All other components within normal limits    Narrative:     Specimen Source->Urine   URINALYSIS MICROSCOPIC - Abnormal; Notable for the following components:    RBC, UA 28 (*)     WBC, UA >100 (*)     WBC Clumps, UA Many (*)     Bacteria Many (*)     All other components within normal limits    Narrative:     Specimen Source->Urine   POCT GLUCOSE - Abnormal; Notable for the following components:    POCT Glucose 247 (*)     All other components within normal limits   CULTURE, BLOOD   CULTURE, BLOOD   CULTURE, URINE   LACTIC ACID, PLASMA   PROCALCITONIN   LACTIC ACID, PLASMA   SARS-COV-2 RDRP GENE    Narrative:     This test utilizes isothermal nucleic acid amplification   technology to detect the SARS-CoV-2 RdRp nucleic acid segment.   The analytical sensitivity (limit of detection) is 125 genome   equivalents/mL.   A POSITIVE result implies infection with the SARS-CoV-2 virus;   the patient is presumed  to be contagious.     A NEGATIVE result means that SARS-CoV-2 nucleic acids are not   present above the limit of detection. A NEGATIVE result should be   treated as presumptive. It does not rule out the possibility of   COVID-19 and should not be the sole basis for treatment decisions.   If COVID-19 is strongly suspected based on clinical and exposure   history, re-testing using an alternate molecular assay should be   considered.   This test is only for use under the Food and Drug   Administration s Emergency Use Authorization (EUA).   Commercial kits are provided by [employer].   Performance characteristics of the EUA have been independently   verified by [facility] Department of   Pathology and Laboratory Medicine.   _________________________________________________________________   The authorized Fact Sheet for Healthcare Providers and the authorized Fact   Sheet for Patients of the ID NOW COVID-19 are available on the FDA   website:     https://www.fda.gov/media/608381/download  https://www.fda.gov/media/065547/download              Imaging Results          X-Ray Chest AP Portable (Final result)  Result time 02/11/22 09:45:25    Final result by Nithin Valadez MD (02/11/22 09:45:25)                 Impression:      No significant intrathoracic abnormality or detrimental interval change in the appearance of the chest since 01/21/2022 at 13:20 appreciated, noting that the current examination demonstrates a poorer inspiratory depth level than that prior study.      Electronically signed by: Nithin Valadez MD  Date:    02/11/2022  Time:    09:45             Narrative:    EXAMINATION:  XR CHEST AP PORTABLE    CLINICAL HISTORY:  Sepsis;    COMPARISON:  Comparison is made to 01/21/2022 at 13:20.    FINDINGS:  Vascular catheter tip is in the right atrium.  Heart size is within normal limits, and there has been no detrimental change in the appearance of the cardiomediastinal silhouette or pulmonary  vascularity since the examination referenced above.  No findings to specifically suggest cardiac decompensation or volume overload.  Lung zones are clear allowing for a poor inspiratory depth level, and are free of significant airspace consolidation or volume loss.  No pleural fluid of any substantial volume is seen on either side, with the minimal blunting of the right costophrenic sulcus seen on the prior exam no longer noted.  No pneumothorax.  Right upper quadrant surgical clips are incidentally noted.                                 Medications   anastrozole tablet 1 mg (1 mg Oral Given 2/11/22 1329)   atorvastatin tablet 80 mg (80 mg Oral Given 2/11/22 1234)   butalbital-acetaminophen-caffeine -40 mg per tablet 1 tablet (has no administration in time range)   famotidine tablet 20 mg (20 mg Oral Given 2/11/22 1234)   magnesium oxide tablet 400 mg (400 mg Oral Given 2/11/22 1231)   methocarbamoL tablet 750 mg (has no administration in time range)   oxybutynin 24 hr tablet 10 mg (10 mg Oral Given 2/11/22 1328)   sevelamer carbonate tablet 800 mg (800 mg Oral Given 2/11/22 1234)   sumatriptan tablet 100 mg (100 mg Oral Given 2/11/22 1402)   levothyroxine tablet 50 mcg (has no administration in time range)   traZODone tablet 100 mg (has no administration in time range)   sodium chloride 0.9% flush 10 mL (has no administration in time range)   magnesium oxide tablet 800 mg (has no administration in time range)   magnesium oxide tablet 800 mg (has no administration in time range)   acetaminophen tablet 650 mg (has no administration in time range)   polyethylene glycol packet 17 g (has no administration in time range)   bisacodyL suppository 10 mg (has no administration in time range)   glucose chewable tablet 16 g (has no administration in time range)   glucose chewable tablet 24 g (has no administration in time range)   glucagon (human recombinant) injection 1 mg (has no administration in time range)    albuterol-ipratropium 2.5 mg-0.5 mg/3 mL nebulizer solution 3 mL (has no administration in time range)   melatonin tablet 6 mg (has no administration in time range)   naloxone 0.4 mg/mL injection 0.02 mg (has no administration in time range)   heparin (porcine) injection 5,000 Units (5,000 Units Subcutaneous Given 2/11/22 1403)   ondansetron disintegrating tablet 8 mg (has no administration in time range)   promethazine tablet 25 mg (has no administration in time range)   sodium chloride 0.9% bolus 500 mL (500 mLs Intravenous New Bag 2/11/22 1240)   vancomycin - pharmacy to dose (has no administration in time range)   meropenem-0.9% sodium chloride 1 g/50 mL IVPB (0 g Intravenous Stopped 2/11/22 1338)   insulin detemir U-100 pen 13 Units (13 Units Subcutaneous Given 2/11/22 1242)   insulin aspart U-100 pen 4 Units (has no administration in time range)   insulin aspart U-100 pen 0-5 Units (has no administration in time range)   dextrose 10% bolus 125 mL (has no administration in time range)   dextrose 10% bolus 250 mL (has no administration in time range)   0.9%  NaCl infusion (has no administration in time range)   venlafaxine 24 hr capsule 150 mg (150 mg Oral Given 2/11/22 1403)   apixaban tablet 2.5 mg (has no administration in time range)   vancomycin 2 g in dextrose 5 % 500 mL IVPB (0 mg/kg × 105.2 kg Intravenous Stopped 2/11/22 1121)     Medical Decision Making:   History:   Old Medical Records: I decided to obtain old medical records.  Old Records Summarized: records from clinic visits.  Initial Assessment:   This is a 71-year-old woman with end-stage renal disease on dialysis, recent urosepsis, who presents with hypotension from dialysis.  She is overall well-appearing with the reassuring blood pressure, but I am concerned that is lower than her baseline could represent early infection, particularly in light of her abnormal appearing urine.  We will send a septic workup including urine and urine culture  after weeks change her suprapubic to, chest x-ray, and will administer broad-spectrum antibiotics empirically given her high risk of occult bacteremia.  Clinical Tests:   Lab Tests: Ordered and Reviewed  Radiological Study: Ordered and Reviewed  ED Management:  Urine appears infected, I have given her broad-spectrum antibiotics.  Her blood pressure recovered, did not require pressors.  Plan for admission to the hospital for further management.                      Clinical Impression:   Final diagnoses:  [I95.9] Hypotension  [A41.9] Sepsis, due to unspecified organism, unspecified whether acute organ dysfunction present (Primary)          ED Disposition Condition    Observation               Adia Dewey MD  02/11/22 6736

## 2022-02-11 NOTE — ED NOTES
Pt's first and last name and birthday confirmed.   LOC: The patient is awake, alert and aware of environment with an appropriate affect, the patient is oriented x 3 and speaking appropriately.  APPEARANCE: Patient resting comfortably and in no acute distress, patient is clean and well groomed. Pt is morbidly obese.   SKIN: The skin is warm and dry, patient has normal skin turgor and moist mucus membranes, heels have cap refill <3 seconds. Skin on feet are extremely dry. (see wounds under LDA).   MUSKULOSKELETAL: Patient moving all extremities well with weakness noted, pt is bed bound, no obvious swelling or deformities noted.  RESPIRATORY: Airway is open and patent, respirations are spontaneous, patient has a normal effort and rate. Breath sounds are clear and equal bilaterally.  CARDIAC: Normal heart sounds. No peripheral edema.  ABDOMEN: Soft and non tender to palpation, no distention noted. Bowel sounds present.   NEURO: No neuro deficits, hand grasp equal, no drift noted, no facial droop noted. Speech is clear.

## 2022-02-11 NOTE — ASSESSMENT & PLAN NOTE
- continue Eliquis 2.5mg BID  - holding Lopressor for now in setting of hypotension, restart when appropriate  - tele

## 2022-02-11 NOTE — H&P
[facility] - Emergency Dept  [facility]  History & Physical    Patient Name: Cecile Bowen  MRN: [id number]  Patient Class: OP- Observation  Admission Date: 2/11/2022  Attending Physician: Adia Dewey MD   Primary Care Provider: Kailey Navarro MD         Patient information was obtained from patient, past medical records and ER records.     Subjective:     Principal Problem:Acute cystitis with hematuria    Chief Complaint:   Chief Complaint   Patient presents with    Hypotension     Arrived via [facility] from dialysis center with c/o hypotension SBP 80s, sent for possible urosepsis, recent UTI diagnosis, did not receive dialysis today, last dialyzed wednesday          HPI: Cecile Bowen is a 71 y.o. female with PMHx significant for HTN, HLD, hx of ESBL UTI, ESRD on HD admitted to observation for hypotension, found to have a UTI. Patient was at her dialysis clinic today when she was found to have a BP of 76/43 and advised to come to the ED for evaluation. Reports her SBPs usually run in the 120s. Endorses cloudy urine in her catheter bag for the past few days, and reports it was last changed about a week ago. Denies lightheadedness, dizziness, SOB, fatigue, weakness, HA, CP, cough, abdominal pain, n/v. Patient was recently hospitalized for a ESBL UTI for which she completed her course of meropenem. Of note, patient also has a right subclavian dialysis access that was also changed about a week ago.     In the ED, BP 94/24 improved to 102/57. Remaining VSS. WBC 8.06. Lactic 1.8. Procal 0.12. Hgb 8.5 (~BL). . Cr 2.3 (~BL). Glucose 224. UA with 1+ leuks, 28 RBCs, >100 WBCs. CXR with no acute processes. Given Vanc 2g x 1.       Past Medical History:   Diagnosis Date    Cervicogenic migraine     Chronic pain     CKD (chronic kidney disease) stage 4, GFR 15-29 ml/min     [facility]    CKD (chronic kidney disease) stage 4, GFR 15-29 ml/min     Diabetes mellitus     Long term use of  Insulin, Diabetic Neuropathy    Fibromyalgia     Hydronephrosis     Hyperlipidemia     Hypertension 12/12/2012    Hypothyroidism 12/12/2012    ARON (iron deficiency anemia)     Insomnia     Levoscoliosis     Malignant neoplasm of upper-outer quadrant of left breast in female, estrogen receptor positive     Metabolic acidosis     Mobility impaired     Nuclear sclerosis - Both Eyes 3/24/2014    VIJAYA (obstructive sleep apnea)     Osteopenia     Pulmonary nodule     Recurrent UTI     Renal manifestation of secondary diabetes mellitus     Secondary hyperparathyroidism, renal     Urinary retention        Past Surgical History:   Procedure Laterality Date    BREAST BIOPSY Right     benign    CHOLECYSTECTOMY      COLONOSCOPY N/A 1/13/2017    Procedure: COLONOSCOPY;  Surgeon: Morris Wiseman MD;  Location: [facility] ENDO (4TH FLR);  Service: Endoscopy;  Laterality: N/A;  Renal pt Nephrology advised to avoid phosphate preps    CYSTOSCOPY N/A 10/8/2018    Procedure: CYSTOSCOPY;  Surgeon: Ronel Whittington MD;  [facility];  Service: Urology;  Laterality: N/A;  45 min    CYSTOSCOPY N/A 3/25/2019    Procedure: CYSTOSCOPY;  Surgeon: Ronel Whittington MD;  [facility];  Service: Urology;  Laterality: N/A;  45 min    CYSTOSCOPY N/A 8/26/2019    Procedure: CYSTOSCOPY;  Surgeon: Ronel Whittington MD;  [facility];  Service: Urology;  Laterality: N/A;  45 min    CYSTOSCOPY N/A 7/2/2021    Procedure: CYSTOSCOPY;  Surgeon: Ronel Whittington MD;  [facility];  Service: Urology;  Laterality: N/A;    CYSTOSCOPY  12/23/2021    Procedure: CYSTOSCOPY;  Surgeon: Ronel Whittington MD;  [facility];  Service: Urology;;    CYSTOSCOPY W/ URETERAL STENT PLACEMENT Left 5/15/2021    Procedure: CYSTOSCOPY, WITH URETERAL STENT INSERTION;  Surgeon: Levy Sánchez Jr., MD;  Location: [facility] OR [address] [place] [address];  Service: Urology;  Laterality:  Left;    CYSTOSCOPY WITH BIOPSY OF BLADDER N/A 1/27/2020    Procedure: CYSTOSCOPY, WITH BLADDER BIOPSY, WITH FULGURATION IF INDICATED;  Surgeon: Ronel Whittington MD;  Location: [address];  Service: Urology;  Laterality: N/A;    DILATION AND CURETTAGE OF UTERUS      GALLBLADDER SURGERY  2006    HYSTERECTOMY      INJECTION FOR SENTINEL NODE IDENTIFICATION Left 8/1/2019    Procedure: INJECTION, FOR SENTINEL NODE IDENTIFICATION;  Surgeon: Samia Fulton MD;  Location: [facility] OR [address] [place] [address];  Service: General;  Laterality: Left;    INJECTION OF BOTULINUM TOXIN TYPE A N/A 10/8/2018    Procedure: INJECTION, BOTULINUM TOXIN, TYPE A 300 UNITS;  Surgeon: Ronel Whittington MD;  Location: [address];  Service: Urology;  Laterality: N/A;    INJECTION OF BOTULINUM TOXIN TYPE A N/A 3/25/2019    Procedure: INJECTION, BOTULINUM TOXIN, TYPE A 300 UNITS;  Surgeon: Ronel Whittington MD;  Location: [address];  Service: Urology;  Laterality: N/A;    INJECTION OF BOTULINUM TOXIN TYPE A N/A 8/26/2019    Procedure: INJECTION, BOTULINUM TOXIN, TYPE A 300 UNITS;  Surgeon: Ronel Whittington MD;  Location: [address];  Service: Urology;  Laterality: N/A;    MASTECTOMY Left 8/1/2019    Procedure: MASTECTOMY 23 hour stay;  Surgeon: Samia Fulton MD;  Location: [address];  Service: General;  Laterality: Left;    OVARIAN CYST SURGERY  1985    REPLACEMENT OF STENT Left 12/23/2021    Procedure: REPLACEMENT, STENT;  Surgeon: Ronel Whittington MD;  Location: [address];  Service: Urology;  Laterality: Left;    SENTINEL LYMPH NODE BIOPSY Left 8/1/2019    Procedure: BIOPSY, LYMPH NODE, SENTINEL;  Surgeon: Samia Fulton MD;  Location: [address];  Service: General;  Laterality: Left;    spt placement      TONSILLECTOMY, ADENOIDECTOMY      TOTAL ABDOMINAL HYSTERECTOMY W/ BILATERAL SALPINGOOPHORECTOMY  1985       Review of patient's allergies indicates:  No Known Allergies    Current  "Facility-Administered Medications on File Prior to Encounter   Medication    darbepoetin chloe-polysorbate injection 50 mcg    [DISCONTINUED] heparin (porcine) injection 1,000 Units    [DISCONTINUED] heparin (porcine) injection 1,500 Units     Current Outpatient Medications on File Prior to Encounter   Medication Sig    albuterol (PROVENTIL HFA) 90 mcg/actuation inhaler Inhale 2 puffs into the lungs every 6 (six) hours as needed for Wheezing or Shortness of Breath. Rescue    anastrozole (ARIMIDEX) 1 mg Tab TAKE 1 TABLET BY MOUTH EVERY DAY    atorvastatin (LIPITOR) 80 MG tablet Take 1 tablet (80 mg total) by mouth once daily.    BD INSULIN SYRINGE ULTRA-FINE 0.5 mL 31 gauge x 5/16" Syrg USE WITH INSULIN 4 TIMES A DAY    blood sugar diagnostic Strp ONE TOUCH ULTRA Check blood sugars 1-2 x a day    blood sugar diagnostic Strp To check BG 4  times daily, to use with insurance preferred meter    blood-glucose meter kit To check BG 4 times daily, to use with insurance preferred meter    butalbital-acetaminophen-caffeine -40 mg (FIORICET, ESGIC) -40 mg per tablet TAKE 1 TABLET BY MOUTH WHEN NOT CONTROLLED BY OTHER MEDS. NO MORE THAN 10 TABS PER 30 DAYS (Patient taking differently: Take 1 tablet by mouth daily as needed (MIGRAINES). TAKE 1 TABLET BY MOUTH WHEN NOT CONTROLLED BY OTHER MEDS. NO MORE THAN 10 TABS PER 30 DAYS)    cloNIDine (CATAPRES) 0.2 MG tablet Take 1 tablet (0.2 mg total) by mouth 3 (three) times daily.    ergocalciferol (ERGOCALCIFEROL) 50,000 unit Cap TAKE 1 CAPSULE (50,000 UNITS TOTAL) BY MOUTH EVERY 7 DAYS. TAKE ONE CAPSULE BY MOUTH ONE TIME PER WEEK, TAKES ON TUESDAYS    famotidine (PEPCID) 20 MG tablet Take 1 tablet (20 mg total) by mouth once daily.    furosemide (LASIX) 80 MG tablet Take 2 tablets (160 mg total) by mouth 2 (two) times daily.    gemfibroziL (LOPID) 600 MG tablet Take 1 tablet (600 mg total) by mouth every Mon, Wed, Fri.    lancets Misc One touch ultra, " "checks 1-2 x a day    lancets [facility] To check BG 4 times daily, to use with insurance preferred meter    lisinopriL 10 MG tablet Take 1 tablet (10 mg total) by mouth once daily.    magnesium oxide (MAG-OX) 400 mg (241.3 mg magnesium) tablet TAKE 1 TABLET (400 MG TOTAL) BY MOUTH 2 (TWO) TIMES DAILY. (Patient taking differently: Take 400 mg by mouth 2 (two) times daily. TAKE 1 TABLET (400 MG TOTAL) BY MOUTH 2 (TWO) TIMES DAILY.)    melatonin (MELATIN) 3 mg tablet Take 2 tablets (6 mg total) by mouth nightly as needed for Insomnia.    methocarbamoL (ROBAXIN) 750 MG Tab TAKE 1-2 TABLETS (750-1,500 MG TOTAL) BY MOUTH 3 (THREE) TIMES DAILY AS NEEDED.    metoprolol tartrate (LOPRESSOR) 25 MG tablet Take 1 tablet (25 mg total) by mouth 2 (two) times daily.    oxybutynin (DITROPAN-XL) 10 MG 24 hr tablet TAKE 1 TABLET BY MOUTH EVERY DAY (Patient taking differently: Take 10 mg by mouth once daily.)    pen needle, diabetic (BD ULTRA-FINE SHORT PEN NEEDLE) 31 gauge x 5/16" Ndle USE WITH LANTUS DAILY, e 11.65    polyethylene glycol (GLYCOLAX) 17 gram PwPk One packet QD prn constipation    sevelamer carbonate (RENVELA) 800 mg Tab Take 1 tablet (800 mg total) by mouth 3 (three) times daily with meals.    sumatriptan (IMITREX) 100 MG tablet TAKE 1 TABLET (100 MG TOTAL) BY MOUTH 2 (TWO) TIMES DAILY AS NEEDED FOR MIGRAINE.    SYNTHROID 50 mcg tablet TAKE 1 TABLET BY MOUTH EVERY DAY (Patient taking differently: Take 50 mcg by mouth before breakfast. Administer on an empty stomach at least 30 minutes before food. If receiving tube feeds, HOLD tube feeds for 1 hour before and after levothyroxine administration.)    traZODone (DESYREL) 100 MG tablet Take 1 tablet (100 mg total) by mouth nightly as needed for Insomnia.    venlafaxine (EFFEXOR-XR) 150 MG Cp24 Take 1 capsule (150 mg total) by mouth once daily.     Family History     Problem Relation (Age of Onset)    ALS Mother    Cancer Maternal Grandmother, Paternal " Grandfather, Brother    Diabetes Sister, Maternal Aunt, Maternal Uncle    Kidney disease Sister, Maternal Aunt    Prostate cancer Brother    Scoliosis Brother        Tobacco Use    Smoking status: Never Smoker    Smokeless tobacco: Never Used   Substance and Sexual Activity    Alcohol use: No     Alcohol/week: 0.0 standard drinks    Drug use: No    Sexual activity: Not Currently     Birth control/protection: Abstinence     Review of Systems   Constitutional: Negative for activity change, chills and fever.   HENT: Negative for trouble swallowing.    Eyes: Negative for photophobia and visual disturbance.   Respiratory: Negative for cough, chest tightness and shortness of breath.    Cardiovascular: Negative for chest pain, palpitations and leg swelling.   Gastrointestinal: Negative for abdominal pain, constipation, diarrhea, nausea and vomiting.   Genitourinary: Negative for dysuria, frequency and hematuria.   Musculoskeletal: Negative for back pain, gait problem and neck pain.   Skin: Negative for rash and wound.   Neurological: Negative for dizziness, syncope, speech difficulty and light-headedness.   Psychiatric/Behavioral: Negative for agitation and confusion. The patient is not nervous/anxious.      Objective:     Vital Signs (Most Recent):  Temp: 97.8 °F (36.6 °C) (02/11/22 0815)  Pulse: 66 (02/11/22 1200)  Resp: 18 (02/11/22 0944)  BP: (!) 102/57 (02/11/22 1130)  SpO2: 97 % (02/11/22 1200) Vital Signs (24h Range):  Temp:  [97.8 °F (36.6 °C)-97.9 °F (36.6 °C)] 97.8 °F (36.6 °C)  Pulse:  [58-66] 66  Resp:  [18] 18  SpO2:  [97 %-99 %] 97 %  BP: ()/(49-57) 102/57     Weight: 105.2 kg (232 lb)  Body mass index is 41.1 kg/m².    Physical Exam  Vitals and nursing note reviewed.   Constitutional:       General: She is not in acute distress.     Appearance: Normal appearance. She is obese. She is not ill-appearing.   HENT:      Head: Normocephalic and atraumatic.      Right Ear: External ear normal.      Left  Ear: External ear normal.   Eyes:      Extraocular Movements: Extraocular movements intact.      Conjunctiva/sclera: Conjunctivae normal.      Pupils: Pupils are equal, round, and reactive to light.   Cardiovascular:      Rate and Rhythm: Normal rate and regular rhythm.      Pulses: Normal pulses.      Heart sounds: Normal heart sounds. No murmur heard.      Pulmonary:      Effort: Pulmonary effort is normal. No respiratory distress.      Breath sounds: Normal breath sounds.   Abdominal:      General: Abdomen is flat. Bowel sounds are normal.      Palpations: Abdomen is soft.      Tenderness: There is no abdominal tenderness.   Genitourinary:     Comments: suprapubic catheter in place  Musculoskeletal:         General: Normal range of motion.      Cervical back: Normal range of motion and neck supple. No tenderness.      Right lower leg: No edema.      Left lower leg: No edema.   Skin:     General: Skin is warm and dry.      Capillary Refill: Capillary refill takes less than 2 seconds.      Coloration: Skin is not jaundiced.   Neurological:      General: No focal deficit present.      Mental Status: She is alert and oriented to person, place, and time. Mental status is at baseline.   Psychiatric:         Mood and Affect: Mood normal.         Behavior: Behavior normal.           CRANIAL NERVES     CN III, IV, VI   Pupils are equal, round, and reactive to light.       Significant Labs:   All pertinent labs within the past 24 hours have been reviewed.  BMP:   Recent Labs   Lab 02/11/22  0849   *   *   K 3.9   CL 92*   CO2 26   BUN 36*   CREATININE 2.3*   CALCIUM 9.3     CBC:   Recent Labs   Lab 02/11/22  0849   WBC 8.06   HGB 8.5*   HCT 27.3*        CMP:   Recent Labs   Lab 02/11/22  0849   *   K 3.9   CL 92*   CO2 26   *   BUN 36*   CREATININE 2.3*   CALCIUM 9.3   PROT 6.5   ALBUMIN 2.3*   BILITOT 0.3   ALKPHOS 110   AST 19   ALT 16   ANIONGAP 13   EGFRNONAA 21.1*     Urine Studies:    Recent Labs   Lab 02/11/22  0930   COLORU Yellow   APPEARANCEUA Cloudy*   PHUR 5.0   SPECGRAV 1.020   PROTEINUA 2+*   GLUCUA Negative   KETONESU Negative   BILIRUBINUA Negative   OCCULTUA 1+*   NITRITE Negative   LEUKOCYTESUR 1+*   RBCUA 28*   WBCUA >100*   BACTERIA Many*   HYALINECASTS 0       Significant Imaging: I have reviewed all pertinent imaging results/findings within the past 24 hours.    Assessment/Plan:     * Acute cystitis with hematuria  - SIRS: 0/4; hypotension  - Hx of ESBL UTI; completed course of meropenem  - UA with 1+ leuks, 28 RBCs, >100 WBCs  - UCx pending  - Given Vanc in ED  - suprapubic catheter changed in the ED  - Will continue Vanc and add Meropenem given last UCx  - IVF    ESRD on hemodialysis  - HD MWF  - Nephrology consulted  - continue sevelamer carbonate 800mg TIDW    Pressure injury of right buttock, stage 3  - present on admission  - do not appear infection on exam  - wound care consulted, appreciate recs  - turn patient q2h      Secondary hypertension  - hold home clonidine, Lopressor, Lasix and lisinopril for now in setting of hypotension  - restart when appropriate      A-fib  - continue Eliquis 2.5mg BID  - holding Lopressor for now in setting of hypotension, restart when appropriate  - tele    Malignant neoplasm of left breast, estrogen receptor positive  - continue home anastrozole 1mg daily    Uncontrolled type 2 diabetes mellitus with stage 4 chronic kidney disease, with long-term current use of insulin  - uncontrolled on admit  - started on Levemir 13 units daily, Novolog 4 units TIDW, SSI  - up titrate as needed  - POCT glucose ACHS  - hypoglycemic protocol  Hemoglobin A1C   Date Value Ref Range Status   01/03/2022 5.4 4.8 - 5.9 % Final   12/21/2021 5.3 4.0 - 5.6 % Final     Comment:     ADA Screening Guidelines:  5.7-6.4%  Consistent with prediabetes  >or=6.5%  Consistent with diabetes    High levels of fetal hemoglobin interfere with the HbA1C  assay. Heterozygous  hemoglobin variants (HbS, HgC, etc)do  not significantly interfere with this assay.   However, presence of multiple variants may affect accuracy.     09/05/2021 6.6 (H) 4.0 - 5.6 % Final     Comment:     ADA Screening Guidelines:  5.7-6.4%  Consistent with prediabetes  >or=6.5%  Consistent with diabetes    High levels of fetal hemoglobin interfere with the HbA1C  assay. Heterozygous hemoglobin variants (HbS, HgC, etc)do  not significantly interfere with this assay.   However, presence of multiple variants may affect accuracy.           Mixed migraine and muscle contraction headache  - stable  - home Fioricet and sumatriptan prn      Urinary retention  - continue home oxybutynin 10mg daily    Hyperlipidemia associated with type 2 diabetes mellitus  - continue Lipitor 80mg daily    Hypothyroidism  - continue home synthroid 50mcg      VTE Risk Mitigation (From admission, onward)         Ordered     apixaban tablet 2.5 mg  2 times daily         02/11/22 1342     heparin (porcine) injection 5,000 Units  Every 8 hours         02/11/22 1132     IP VTE HIGH RISK PATIENT  Once         02/11/22 1132     Place sequential compression device  Until discontinued         02/11/22 1132                   Mally Joyner PA-C  Department of Hospital Medicine   [facility] - Emergency Dept

## 2022-02-11 NOTE — SUBJECTIVE & OBJECTIVE
Past Medical History:   Diagnosis Date    Cervicogenic migraine     Chronic pain     CKD (chronic kidney disease) stage 4, GFR 15-29 ml/min     Dr Piedadmoody    CKD (chronic kidney disease) stage 4, GFR 15-29 ml/min     Diabetes mellitus     Long term use of Insulin, Diabetic Neuropathy    Fibromyalgia     Hydronephrosis     Hyperlipidemia     Hypertension 12/12/2012    Hypothyroidism 12/12/2012    ARON (iron deficiency anemia)     Insomnia     Levoscoliosis     Malignant neoplasm of upper-outer quadrant of left breast in female, estrogen receptor positive     Metabolic acidosis     Mobility impaired     Nuclear sclerosis - Both Eyes 3/24/2014    VIJAYA (obstructive sleep apnea)     Osteopenia     Pulmonary nodule     Recurrent UTI     Renal manifestation of secondary diabetes mellitus     Secondary hyperparathyroidism, renal     Urinary retention        Past Surgical History:   Procedure Laterality Date    BREAST BIOPSY Right     benign    CHOLECYSTECTOMY      COLONOSCOPY N/A 1/13/2017    Procedure: COLONOSCOPY;  Surgeon: Morris Wiseman MD;  Location: [facility] (4TH FLR);  Service: Endoscopy;  Laterality: N/A;  Renal pt Nephrology advised to avoid phosphate preps    CYSTOSCOPY N/A 10/8/2018    Procedure: CYSTOSCOPY;  Surgeon: Ronel Whittington MD;  [facility];  Service: Urology;  Laterality: N/A;  45 min    CYSTOSCOPY N/A 3/25/2019    Procedure: CYSTOSCOPY;  Surgeon: Ronel Whittington MD;  Location: [facility] OR [address] [place] [address];  Service: Urology;  Laterality: N/A;  45 min    CYSTOSCOPY N/A 8/26/2019    Procedure: CYSTOSCOPY;  Surgeon: Ronel Whittington MD;  Location: [address];  Service: Urology;  Laterality: N/A;  45 min    CYSTOSCOPY N/A 7/2/2021    Procedure: CYSTOSCOPY;  Surgeon: Ronel Whittington MD;  Location: [facility] OR [address] [place] [address];  Service: Urology;  Laterality: N/A;    CYSTOSCOPY  12/23/2021    Procedure: CYSTOSCOPY;  Surgeon: Ronel Whittington MD;   Location: [address];  Service: Urology;;    CYSTOSCOPY W/ URETERAL STENT PLACEMENT Left 5/15/2021    Procedure: CYSTOSCOPY, WITH URETERAL STENT INSERTION;  Surgeon: Levy Sánchez Jr., MD;  Location: [address];  Service: Urology;  Laterality: Left;    CYSTOSCOPY WITH BIOPSY OF BLADDER N/A 1/27/2020    Procedure: CYSTOSCOPY, WITH BLADDER BIOPSY, WITH FULGURATION IF INDICATED;  Surgeon: Ronel Whittington MD;  Location: [address];  Service: Urology;  Laterality: N/A;    DILATION AND CURETTAGE OF UTERUS      GALLBLADDER SURGERY  2006    HYSTERECTOMY      INJECTION FOR SENTINEL NODE IDENTIFICATION Left 8/1/2019    Procedure: INJECTION, FOR SENTINEL NODE IDENTIFICATION;  Surgeon: Samia Fulton MD;  Location: [address];  Service: General;  Laterality: Left;    INJECTION OF BOTULINUM TOXIN TYPE A N/A 10/8/2018    Procedure: INJECTION, BOTULINUM TOXIN, TYPE A 300 UNITS;  Surgeon: Ronel Whittington MD;  Location: [address];  Service: Urology;  Laterality: N/A;    INJECTION OF BOTULINUM TOXIN TYPE A N/A 3/25/2019    Procedure: INJECTION, BOTULINUM TOXIN, TYPE A 300 UNITS;  Surgeon: Ronel Whittington MD;  Location: [address];  Service: Urology;  Laterality: N/A;    INJECTION OF BOTULINUM TOXIN TYPE A N/A 8/26/2019    Procedure: INJECTION, BOTULINUM TOXIN, TYPE A 300 UNITS;  Surgeon: Ronel Whittington MD;  Location: [address];  Service: Urology;  Laterality: N/A;    MASTECTOMY Left 8/1/2019    Procedure: MASTECTOMY 23 hour stay;  Surgeon: Samia Fulton MD;  Location: [address];  Service: General;  Laterality: Left;    OVARIAN CYST SURGERY  1985    REPLACEMENT OF STENT Left 12/23/2021    Procedure: REPLACEMENT, STENT;  Surgeon: Ronel Whittington MD;  Location: [address];  Service: Urology;  Laterality: Left;    SENTINEL LYMPH NODE BIOPSY Left 8/1/2019    Procedure: BIOPSY, LYMPH NODE, SENTINEL;  Surgeon: Samia Fulton MD;  Location:  "[facility] OR 2ND FLR;  Service: General;  Laterality: Left;    spt placement      TONSILLECTOMY, ADENOIDECTOMY      TOTAL ABDOMINAL HYSTERECTOMY W/ BILATERAL SALPINGOOPHORECTOMY  1985       Review of patient's allergies indicates:  No Known Allergies    Current Facility-Administered Medications on File Prior to Encounter   Medication    darbepoetin chloe-polysorbate injection 50 mcg    [DISCONTINUED] heparin (porcine) injection 1,000 Units    [DISCONTINUED] heparin (porcine) injection 1,500 Units     Current Outpatient Medications on File Prior to Encounter   Medication Sig    albuterol (PROVENTIL HFA) 90 mcg/actuation inhaler Inhale 2 puffs into the lungs every 6 (six) hours as needed for Wheezing or Shortness of Breath. Rescue    anastrozole (ARIMIDEX) 1 mg Tab TAKE 1 TABLET BY MOUTH EVERY DAY    atorvastatin (LIPITOR) 80 MG tablet Take 1 tablet (80 mg total) by mouth once daily.    BD INSULIN SYRINGE ULTRA-FINE 0.5 mL 31 gauge x 5/16" Syrg USE WITH INSULIN 4 TIMES A DAY    blood sugar diagnostic Strp ONE TOUCH ULTRA Check blood sugars 1-2 x a day    blood sugar diagnostic Strp To check BG 4  times daily, to use with insurance preferred meter    blood-glucose meter kit To check BG 4 times daily, to use with insurance preferred meter    butalbital-acetaminophen-caffeine -40 mg (FIORICET, ESGIC) -40 mg per tablet TAKE 1 TABLET BY MOUTH WHEN NOT CONTROLLED BY OTHER MEDS. NO MORE THAN 10 TABS PER 30 DAYS (Patient taking differently: Take 1 tablet by mouth daily as needed (MIGRAINES). TAKE 1 TABLET BY MOUTH WHEN NOT CONTROLLED BY OTHER MEDS. NO MORE THAN 10 TABS PER 30 DAYS)    cloNIDine (CATAPRES) 0.2 MG tablet Take 1 tablet (0.2 mg total) by mouth 3 (three) times daily.    ergocalciferol (ERGOCALCIFEROL) 50,000 unit Cap TAKE 1 CAPSULE (50,000 UNITS TOTAL) BY MOUTH EVERY 7 DAYS. TAKE ONE CAPSULE BY MOUTH ONE TIME PER WEEK, TAKES ON TUESDAYS    famotidine (PEPCID) 20 MG tablet Take 1 tablet (20 " "mg total) by mouth once daily.    furosemide (LASIX) 80 MG tablet Take 2 tablets (160 mg total) by mouth 2 (two) times daily.    gemfibroziL (LOPID) 600 MG tablet Take 1 tablet (600 mg total) by mouth every Mon, Wed, Fri.    lancets Misc One touch ultra, checks 1-2 x a day    lancets [facility] To check BG 4 times daily, to use with insurance preferred meter    lisinopriL 10 MG tablet Take 1 tablet (10 mg total) by mouth once daily.    magnesium oxide (MAG-OX) 400 mg (241.3 mg magnesium) tablet TAKE 1 TABLET (400 MG TOTAL) BY MOUTH 2 (TWO) TIMES DAILY. (Patient taking differently: Take 400 mg by mouth 2 (two) times daily. TAKE 1 TABLET (400 MG TOTAL) BY MOUTH 2 (TWO) TIMES DAILY.)    melatonin (MELATIN) 3 mg tablet Take 2 tablets (6 mg total) by mouth nightly as needed for Insomnia.    methocarbamoL (ROBAXIN) 750 MG Tab TAKE 1-2 TABLETS (750-1,500 MG TOTAL) BY MOUTH 3 (THREE) TIMES DAILY AS NEEDED.    metoprolol tartrate (LOPRESSOR) 25 MG tablet Take 1 tablet (25 mg total) by mouth 2 (two) times daily.    oxybutynin (DITROPAN-XL) 10 MG 24 hr tablet TAKE 1 TABLET BY MOUTH EVERY DAY (Patient taking differently: Take 10 mg by mouth once daily.)    pen needle, diabetic (BD ULTRA-FINE SHORT PEN NEEDLE) 31 gauge x 5/16" Ndle USE WITH LANTUS DAILY, e 11.65    polyethylene glycol (GLYCOLAX) 17 gram PwPk One packet QD prn constipation    sevelamer carbonate (RENVELA) 800 mg Tab Take 1 tablet (800 mg total) by mouth 3 (three) times daily with meals.    sumatriptan (IMITREX) 100 MG tablet TAKE 1 TABLET (100 MG TOTAL) BY MOUTH 2 (TWO) TIMES DAILY AS NEEDED FOR MIGRAINE.    SYNTHROID 50 mcg tablet TAKE 1 TABLET BY MOUTH EVERY DAY (Patient taking differently: Take 50 mcg by mouth before breakfast. Administer on an empty stomach at least 30 minutes before food. If receiving tube feeds, HOLD tube feeds for 1 hour before and after levothyroxine administration.)    traZODone (DESYREL) 100 MG tablet Take 1 tablet (100 mg " total) by mouth nightly as needed for Insomnia.    venlafaxine (EFFEXOR-XR) 150 MG Cp24 Take 1 capsule (150 mg total) by mouth once daily.     Family History     Problem Relation (Age of Onset)    ALS Mother    Cancer Maternal Grandmother, Paternal Grandfather, Brother    Diabetes Sister, Maternal Aunt, Maternal Uncle    Kidney disease Sister, Maternal Aunt    Prostate cancer Brother    Scoliosis Brother        Tobacco Use    Smoking status: Never Smoker    Smokeless tobacco: Never Used   Substance and Sexual Activity    Alcohol use: No     Alcohol/week: 0.0 standard drinks    Drug use: No    Sexual activity: Not Currently     Birth control/protection: Abstinence     Review of Systems   Constitutional: Negative for activity change, chills and fever.   HENT: Negative for trouble swallowing.    Eyes: Negative for photophobia and visual disturbance.   Respiratory: Negative for cough, chest tightness and shortness of breath.    Cardiovascular: Negative for chest pain, palpitations and leg swelling.   Gastrointestinal: Negative for abdominal pain, constipation, diarrhea, nausea and vomiting.   Genitourinary: Negative for dysuria, frequency and hematuria.   Musculoskeletal: Negative for back pain, gait problem and neck pain.   Skin: Negative for rash and wound.   Neurological: Negative for dizziness, syncope, speech difficulty and light-headedness.   Psychiatric/Behavioral: Negative for agitation and confusion. The patient is not nervous/anxious.      Objective:     Vital Signs (Most Recent):  Temp: 97.8 °F (36.6 °C) (02/11/22 0815)  Pulse: 66 (02/11/22 1200)  Resp: 18 (02/11/22 0944)  BP: (!) 102/57 (02/11/22 1130)  SpO2: 97 % (02/11/22 1200) Vital Signs (24h Range):  Temp:  [97.8 °F (36.6 °C)-97.9 °F (36.6 °C)] 97.8 °F (36.6 °C)  Pulse:  [58-66] 66  Resp:  [18] 18  SpO2:  [97 %-99 %] 97 %  BP: ()/(49-57) 102/57     Weight: 105.2 kg (232 lb)  Body mass index is 41.1 kg/m².    Physical Exam  Vitals and nursing  note reviewed.   Constitutional:       General: She is not in acute distress.     Appearance: Normal appearance. She is obese. She is not ill-appearing.   HENT:      Head: Normocephalic and atraumatic.      Right Ear: External ear normal.      Left Ear: External ear normal.   Eyes:      Extraocular Movements: Extraocular movements intact.      Conjunctiva/sclera: Conjunctivae normal.      Pupils: Pupils are equal, round, and reactive to light.   Cardiovascular:      Rate and Rhythm: Normal rate and regular rhythm.      Pulses: Normal pulses.      Heart sounds: Normal heart sounds. No murmur heard.      Pulmonary:      Effort: Pulmonary effort is normal. No respiratory distress.      Breath sounds: Normal breath sounds.   Abdominal:      General: Abdomen is flat. Bowel sounds are normal.      Palpations: Abdomen is soft.      Tenderness: There is no abdominal tenderness.   Genitourinary:     Comments: suprapubic catheter in place  Musculoskeletal:         General: Normal range of motion.      Cervical back: Normal range of motion and neck supple. No tenderness.      Right lower leg: No edema.      Left lower leg: No edema.   Skin:     General: Skin is warm and dry.      Capillary Refill: Capillary refill takes less than 2 seconds.      Coloration: Skin is not jaundiced.   Neurological:      General: No focal deficit present.      Mental Status: She is alert and oriented to person, place, and time. Mental status is at baseline.   Psychiatric:         Mood and Affect: Mood normal.         Behavior: Behavior normal.           CRANIAL NERVES     CN III, IV, VI   Pupils are equal, round, and reactive to light.       Significant Labs:   All pertinent labs within the past 24 hours have been reviewed.  BMP:   Recent Labs   Lab 02/11/22  0849   *   *   K 3.9   CL 92*   CO2 26   BUN 36*   CREATININE 2.3*   CALCIUM 9.3     CBC:   Recent Labs   Lab 02/11/22  0849   WBC 8.06   HGB 8.5*   HCT 27.3*        CMP:    Recent Labs   Lab 02/11/22  0849   *   K 3.9   CL 92*   CO2 26   *   BUN 36*   CREATININE 2.3*   CALCIUM 9.3   PROT 6.5   ALBUMIN 2.3*   BILITOT 0.3   ALKPHOS 110   AST 19   ALT 16   ANIONGAP 13   EGFRNONAA 21.1*     Urine Studies:   Recent Labs   Lab 02/11/22  0930   COLORU Yellow   APPEARANCEUA Cloudy*   PHUR 5.0   SPECGRAV 1.020   PROTEINUA 2+*   GLUCUA Negative   KETONESU Negative   BILIRUBINUA Negative   OCCULTUA 1+*   NITRITE Negative   LEUKOCYTESUR 1+*   RBCUA 28*   WBCUA >100*   BACTERIA Many*   HYALINECASTS 0       Significant Imaging: I have reviewed all pertinent imaging results/findings within the past 24 hours.

## 2022-02-11 NOTE — ASSESSMENT & PLAN NOTE
72 yo F admitted with concern for sepsis due to UTI. UA with 1+ leuks, 28 RBCs, >100 WBCs.   ZAK on CKD IV secondary to septic shock. Dialysis dependent ZAK since 12/15/21.      ESRD on IHD MWF  Out patient HD Center - [facility] / Dr. Hurst   Duration of outpatient dialysis session - 3.5 hrs  EDW: 114 kg   Residual Renal Function (Urine Output) -   Access:R CVC    -No emergent need for clearance and volume management today. Will hold RRT given low BP earlier and concern for sepsis.   -Dialysis for metabolic clearance and volume management will be provided tomorrow AM.   -BFR: 400 mL/min  -Target ultrafiltration: 2-3 L as tolerated, keep MAP >65.  -Duration: 3.5 hrs   -Labs reviewed and dialysate to be adjusted to current labs.   -Pre and Post-HD weights to determine EDW.   -Monitor for complications and adverse events.  -Strict Input and Output and chart   -Daily weights.  -Avoid nephrotoxic medication and renal dose medications to GFR.  -Low NA, K, PO4 diet.  -Will evaluate HD requirements Daily  -Continue MWF iHD schedule while inpatient

## 2022-02-12 LAB
ANION GAP SERPL CALC-SCNC: 11 MMOL/L (ref 8–16)
BASOPHILS # BLD AUTO: 0.04 K/UL (ref 0–0.2)
BASOPHILS NFR BLD: 0.6 % (ref 0–1.9)
BUN SERPL-MCNC: 37 MG/DL (ref 8–23)
CALCIUM SERPL-MCNC: 9.1 MG/DL (ref 8.7–10.5)
CHLORIDE SERPL-SCNC: 97 MMOL/L (ref 95–110)
CO2 SERPL-SCNC: 24 MMOL/L (ref 23–29)
CREAT SERPL-MCNC: 2.2 MG/DL (ref 0.5–1.4)
DIFFERENTIAL METHOD: ABNORMAL
EOSINOPHIL # BLD AUTO: 0.5 K/UL (ref 0–0.5)
EOSINOPHIL NFR BLD: 7.5 % (ref 0–8)
ERYTHROCYTE [DISTWIDTH] IN BLOOD BY AUTOMATED COUNT: 15.3 % (ref 11.5–14.5)
EST. GFR  (AFRICAN AMERICAN): 25.6 ML/MIN/1.73 M^2
EST. GFR  (NON AFRICAN AMERICAN): 22.2 ML/MIN/1.73 M^2
GLUCOSE SERPL-MCNC: 102 MG/DL (ref 70–110)
GLUCOSE SERPL-MCNC: 188 MG/DL (ref 70–110)
HCT VFR BLD AUTO: 26.4 % (ref 37–48.5)
HGB BLD-MCNC: 8.1 G/DL (ref 12–16)
IMM GRANULOCYTES # BLD AUTO: 0.05 K/UL (ref 0–0.04)
IMM GRANULOCYTES NFR BLD AUTO: 0.8 % (ref 0–0.5)
LYMPHOCYTES # BLD AUTO: 1.7 K/UL (ref 1–4.8)
LYMPHOCYTES NFR BLD: 26.3 % (ref 18–48)
MCH RBC QN AUTO: 29.7 PG (ref 27–31)
MCHC RBC AUTO-ENTMCNC: 30.7 G/DL (ref 32–36)
MCV RBC AUTO: 97 FL (ref 82–98)
MONOCYTES # BLD AUTO: 0.8 K/UL (ref 0.3–1)
MONOCYTES NFR BLD: 11.7 % (ref 4–15)
NEUTROPHILS # BLD AUTO: 3.5 K/UL (ref 1.8–7.7)
NEUTROPHILS NFR BLD: 53.1 % (ref 38–73)
NRBC BLD-RTO: 0 /100 WBC
PHOSPHATE SERPL-MCNC: 5 MG/DL (ref 2.7–4.5)
PLATELET # BLD AUTO: 335 K/UL (ref 150–450)
PMV BLD AUTO: 9.8 FL (ref 9.2–12.9)
POCT GLUCOSE: 142 MG/DL (ref 70–110)
POCT GLUCOSE: 147 MG/DL (ref 70–110)
POCT GLUCOSE: 195 MG/DL (ref 70–110)
POTASSIUM SERPL-SCNC: 3.9 MMOL/L (ref 3.5–5.1)
RBC # BLD AUTO: 2.73 M/UL (ref 4–5.4)
SODIUM SERPL-SCNC: 132 MMOL/L (ref 136–145)
VANCOMYCIN SERPL-MCNC: 18.6 UG/ML
WBC # BLD AUTO: 6.51 K/UL (ref 3.9–12.7)

## 2022-02-12 PROCEDURE — 84100 ASSAY OF PHOSPHORUS: CPT | Mod: HCNC | Performed by: EMERGENCY MEDICINE

## 2022-02-12 PROCEDURE — 25000003 PHARM REV CODE 250: Mod: HCNC | Performed by: HOSPITALIST

## 2022-02-12 PROCEDURE — 80202 ASSAY OF VANCOMYCIN: CPT | Mod: HCNC | Performed by: EMERGENCY MEDICINE

## 2022-02-12 PROCEDURE — 63600175 PHARM REV CODE 636 W HCPCS: Mod: HCNC | Performed by: INTERNAL MEDICINE

## 2022-02-12 PROCEDURE — G0257 UNSCHED DIALYSIS ESRD PT HOS: HCPCS | Mod: HCNC

## 2022-02-12 PROCEDURE — C9399 UNCLASSIFIED DRUGS OR BIOLOG: HCPCS | Mod: HCNC | Performed by: PHYSICIAN ASSISTANT

## 2022-02-12 PROCEDURE — G0378 HOSPITAL OBSERVATION PER HR: HCPCS | Mod: HCNC

## 2022-02-12 PROCEDURE — 25000003 PHARM REV CODE 250: Mod: HCNC | Performed by: NURSE PRACTITIONER

## 2022-02-12 PROCEDURE — 85025 COMPLETE CBC W/AUTO DIFF WBC: CPT | Mod: HCNC | Performed by: PHYSICIAN ASSISTANT

## 2022-02-12 PROCEDURE — 63600175 PHARM REV CODE 636 W HCPCS: Mod: HCNC | Performed by: NURSE PRACTITIONER

## 2022-02-12 PROCEDURE — 99226 PR SUBSEQUENT OBSERVATION CARE,LEVEL III: ICD-10-PCS | Mod: HCNC,,, | Performed by: INTERNAL MEDICINE

## 2022-02-12 PROCEDURE — 36415 COLL VENOUS BLD VENIPUNCTURE: CPT | Mod: HCNC | Performed by: EMERGENCY MEDICINE

## 2022-02-12 PROCEDURE — 90935 HEMODIALYSIS ONE EVALUATION: CPT | Mod: HCNC,,, | Performed by: NURSE PRACTITIONER

## 2022-02-12 PROCEDURE — 63600175 PHARM REV CODE 636 W HCPCS: Mod: HCNC | Performed by: PHYSICIAN ASSISTANT

## 2022-02-12 PROCEDURE — 99226 PR SUBSEQUENT OBSERVATION CARE,LEVEL III: CPT | Mod: HCNC,,, | Performed by: INTERNAL MEDICINE

## 2022-02-12 PROCEDURE — 96372 THER/PROPH/DIAG INJ SC/IM: CPT | Mod: HCNC | Performed by: PHYSICIAN ASSISTANT

## 2022-02-12 PROCEDURE — 80100016 HC MAINTENANCE HEMODIALYSIS

## 2022-02-12 PROCEDURE — 90935 PR HEMODIALYSIS, ONE EVALUATION: ICD-10-PCS | Mod: HCNC,,, | Performed by: NURSE PRACTITIONER

## 2022-02-12 PROCEDURE — 25000003 PHARM REV CODE 250: Mod: HCNC | Performed by: PHYSICIAN ASSISTANT

## 2022-02-12 PROCEDURE — 80048 BASIC METABOLIC PNL TOTAL CA: CPT | Mod: HCNC | Performed by: PHYSICIAN ASSISTANT

## 2022-02-12 RX ORDER — ACETAMINOPHEN 325 MG/1
650 TABLET ORAL EVERY 8 HOURS PRN
Status: DISCONTINUED | OUTPATIENT
Start: 2022-02-12 | End: 2022-02-25 | Stop reason: HOSPADM

## 2022-02-12 RX ORDER — HYDROCODONE BITARTRATE AND ACETAMINOPHEN 10; 325 MG/1; MG/1
1 TABLET ORAL EVERY 6 HOURS PRN
Status: DISCONTINUED | OUTPATIENT
Start: 2022-02-12 | End: 2022-02-13

## 2022-02-12 RX ORDER — MEROPENEM AND SODIUM CHLORIDE 1 G/50ML
1 INJECTION, SOLUTION INTRAVENOUS
Status: DISCONTINUED | OUTPATIENT
Start: 2022-02-12 | End: 2022-02-16

## 2022-02-12 RX ORDER — HEPARIN SODIUM 1000 [USP'U]/ML
1000 INJECTION, SOLUTION INTRAVENOUS; SUBCUTANEOUS
Status: DISPENSED | OUTPATIENT
Start: 2022-02-12 | End: 2022-02-22

## 2022-02-12 RX ADMIN — METHOCARBAMOL 750 MG: 750 TABLET ORAL at 09:02

## 2022-02-12 RX ADMIN — INSULIN ASPART 4 UNITS: 100 INJECTION, SOLUTION INTRAVENOUS; SUBCUTANEOUS at 01:02

## 2022-02-12 RX ADMIN — FAMOTIDINE 20 MG: 20 TABLET ORAL at 01:02

## 2022-02-12 RX ADMIN — LEVOTHYROXINE SODIUM 50 MCG: 50 TABLET ORAL at 05:02

## 2022-02-12 RX ADMIN — VANCOMYCIN HYDROCHLORIDE 500 MG: 500 INJECTION, POWDER, LYOPHILIZED, FOR SOLUTION INTRAVENOUS at 01:02

## 2022-02-12 RX ADMIN — APIXABAN 2.5 MG: 2.5 TABLET, FILM COATED ORAL at 08:02

## 2022-02-12 RX ADMIN — INSULIN DETEMIR 13 UNITS: 100 INJECTION, SOLUTION SUBCUTANEOUS at 01:02

## 2022-02-12 RX ADMIN — ANASTROZOLE 1 MG: 1 TABLET, COATED ORAL at 01:02

## 2022-02-12 RX ADMIN — HEPARIN SODIUM 1000 UNITS: 1000 INJECTION, SOLUTION INTRAVENOUS; SUBCUTANEOUS at 11:02

## 2022-02-12 RX ADMIN — Medication 400 MG: at 08:02

## 2022-02-12 RX ADMIN — SEVELAMER CARBONATE 800 MG: 800 TABLET, FILM COATED ORAL at 01:02

## 2022-02-12 RX ADMIN — APIXABAN 2.5 MG: 2.5 TABLET, FILM COATED ORAL at 01:02

## 2022-02-12 RX ADMIN — TRAZODONE HYDROCHLORIDE 100 MG: 100 TABLET ORAL at 08:02

## 2022-02-12 RX ADMIN — MEROPENEM AND SODIUM CHLORIDE 1 G: 1 INJECTION, SOLUTION INTRAVENOUS at 05:02

## 2022-02-12 RX ADMIN — SODIUM CHLORIDE: 0.9 INJECTION, SOLUTION INTRAVENOUS at 07:02

## 2022-02-12 RX ADMIN — INSULIN ASPART 4 UNITS: 100 INJECTION, SOLUTION INTRAVENOUS; SUBCUTANEOUS at 05:02

## 2022-02-12 RX ADMIN — BUTALBITAL, ACETAMINOPHEN, AND CAFFEINE 1 TABLET: 50; 325; 40 TABLET ORAL at 10:02

## 2022-02-12 RX ADMIN — ACETAMINOPHEN 650 MG: 325 TABLET ORAL at 09:02

## 2022-02-12 RX ADMIN — ATORVASTATIN CALCIUM 80 MG: 40 TABLET, FILM COATED ORAL at 01:02

## 2022-02-12 RX ADMIN — MEROPENEM AND SODIUM CHLORIDE 1 G: 1 INJECTION, SOLUTION INTRAVENOUS at 04:02

## 2022-02-12 RX ADMIN — OXYBUTYNIN CHLORIDE 10 MG: 10 TABLET, EXTENDED RELEASE ORAL at 01:02

## 2022-02-12 RX ADMIN — HYDROCODONE BITARTRATE AND ACETAMINOPHEN 1 TABLET: 10; 325 TABLET ORAL at 09:02

## 2022-02-12 RX ADMIN — SEVELAMER CARBONATE 800 MG: 800 TABLET, FILM COATED ORAL at 05:02

## 2022-02-12 RX ADMIN — Medication 400 MG: at 01:02

## 2022-02-12 RX ADMIN — VENLAFAXINE HYDROCHLORIDE 150 MG: 150 CAPSULE, EXTENDED RELEASE ORAL at 01:02

## 2022-02-12 RX ADMIN — HEPARIN SODIUM 5000 UNITS: 5000 INJECTION INTRAVENOUS; SUBCUTANEOUS at 05:02

## 2022-02-12 RX ADMIN — HEPARIN SODIUM 5000 UNITS: 5000 INJECTION INTRAVENOUS; SUBCUTANEOUS at 09:02

## 2022-02-12 NOTE — NURSING
Pt AAOx4 bedbound. PT has had no complaints. Wound care education given to rotate and stay off her bottom. Wound care consult placed for evaluation. IV access maintained. Call bell within reach.

## 2022-02-12 NOTE — PROGRESS NOTES
Hemodialysis Note  Pt seen and evaluated while undergoing dialysis. Tolerating dialysis with current UFR, without complications. Labs reviewed and dialysate bath adjusted.     BFR: 400  UF goal: 2.5L as tolerated  Anemia: noted Hgb 9.2 2/9/22, 8.5 2/11/22, & 8.1 today; admitted with acute cystitis with hematuria; ferritin 470 (2/9/22); tSat 20 (2/9/22); NKDA per EMR; plan for iron supplementation w/ next HD  MBD: on sevelamer 800 mg x 1 WM; Ph- 7.8 (2/9 & noted previous Ph- levels lower); Ph- (add-on)  HTN: BP stable  Diet: on renal

## 2022-02-12 NOTE — CONSULTS
"  [facility] - Telemetry Stepdown ([place] [address])  Adult Nutrition  Consult Note    SUMMARY     Recommendations    1. Continue current Renal diet, add Novasource ONS if PO intake inadequate.  2. RD to monitor & follow-up.    Goals: Meet % EEN, EPN by RD f/u date  Nutrition Goal Status: new  Communication of RD Recs: reviewed with RN    Assessment and Plan    Nutrition Problem  Altered nutrition related lab values     Related to (etiology):   ESRD    Signs and Symptoms (as evidenced by):   Elevated BUN/Creat, decreased GFR    Interventions(treatment strategy):  Collaboration of nutrition care w/ other providers    Nutrition Diagnosis Status:   New    Reason for Assessment    Reason For Assessment: consult  Diagnosis: other (see comments) (Cystitis)  Relevant Medical History: HTN, HLD, ESRD on HD  Interdisciplinary Rounds: did not attend    General Information Comments: Pt XI at HD. Unsure of PO intake PTA/currently. Per chart review, pt's UBW: 122.5kg - unsure if weight loss is fluid related. RD unable to assess for malnutrition. Will monitor energy intake & weight changes.  Nutrition Discharge Planning: Adequate PO intake    Nutrition/Diet History    Patient Reported Diet/Restrictions/Preferences: diabetic diet,renal    Anthropometrics    Temp: 98.3 °F (36.8 °C)  Height Method: Estimated  Height: 5' 3" (160 cm)  Height (inches): 63 in  Weight Method: Estimated  Weight: 105.2 kg (231 lb 14.8 oz)  Weight (lb): 231.93 lb  Ideal Body Weight (IBW), Female: 115 lb  % Ideal Body Weight, Female (lb): 201.68 %  BMI (Calculated): 41.1  BMI Grade: greater than 40 - morbid obesity  Usual Body Weight (UBW), k.5 kg (Per chart review.)  % Usual Body Weight: 86.06  % Weight Change From Usual Weight: -14.12 %    Lab/Procedures/Meds    Pertinent Labs Reviewed: reviewed  Pertinent Labs Comments: Na 132, BUN 37, Creat 2.2, GFR 22.2,   Pertinent Medications Reviewed: reviewed    Estimated/Assessed Needs    Weight Used " For Calorie Calculations: 105.2 kg (231 lb 14.8 oz)     Energy Calorie Requirements (kcal): 1701 kcal/d  Energy Need Method: Boundary-St Jeor (1.1 PAL)     Protein Requirements: 105-116 g/d (1-1.1 g/kg)  Weight Used For Protein Calculations: 105.2 kg (231 lb 14.8 oz)     Estimated Fluid Requirement Method: other (see comments) (Per MD or 1 mL/kcal)  RDA Method (mL): 1701    Nutrition Prescription Ordered    Current Diet Order: Renal    Evaluation of Received Nutrient/Fluid Intake    I/O: -    Comments: LBM: 2/1    Tolerance: tolerating    Nutrition Risk    Level of Risk/Frequency of Follow-up: (1x/week)     Monitor and Evaluation    Food and Nutrient Intake: energy intake,food and beverage intake  Food and Nutrient Adminstration: diet order  Physical Activity and Function: nutrition-related ADLs and IADLs  Anthropometric Measurements: weight,weight change  Biochemical Data, Medical Tests and Procedures: inflammatory profile,lipid profile,glucose/endocrine profile,electrolyte and renal panel  Nutrition-Focused Physical Findings: overall appearance     Nutrition Follow-Up    RD Follow-up?: Yes

## 2022-02-12 NOTE — HOSPITAL COURSE
Admitted to  for possible UTI. Cultures obtained, suprapubic catheter changes apparently in the ED, and started Abx. Nephrology consulted for HD. Pt continued on broad antibiotics w/improvement in signs and symptoms. Final culture results w/klebsiella pneumonia- susceptible to connie and bactrim. Urology evaluated patient, 02/14. Pt w/h/o Lt ureteral stone- s/p stent placement 06/2021 and replacement 12/2021- intermittently lost to follow-up. Planning for ureteroscopy 02/18 w/definitive stone management and stent removal as patient has high likelihood to continue to be lost to f/u. Agreed that it was in the best interest of the patient to remain inpatient to undergo further interventions/evaluations via urology. Pt was transitioned to bactrim 02/16- 2/19 and connie stopped. To OR with Urology on 2/18:  Left ureteroscopy, Cystoscopy,  Left ureteral biopsy,  Left JJ ureteral stent exchange,  Left retrograde pyelogram. Nurse removed stent on strings on 2/21.  Apixaban resumed.   Nephrology followed and discontinued HD while admitted.  Patient to retain HD catheter until f/u with nephrology.  Cr stable at 1.9 at discharge. Patient discharged with home health.

## 2022-02-12 NOTE — PROGRESS NOTES
Transferred from unit via hospital bed by transport back to room. AAOx4, nad noted, tele monitor in place

## 2022-02-12 NOTE — PROGRESS NOTES
Pharmacist Renal Dose Adjustment Note    Cecile Bowen is a 71 y.o. female being treated with the medication Meropenem.    Patient Data:    Vital Signs (Most Recent):  Temp: 98.3 °F (36.8 °C) (02/12/22 0715)  Pulse: 78 (02/12/22 1045)  Resp: 18 (02/12/22 0715)  BP: (!) 142/72 (02/12/22 1045)  SpO2: (!) 94 % (02/12/22 0015) Vital Signs (72h Range):  Temp:  [97.8 °F (36.6 °C)-98.3 °F (36.8 °C)]   Pulse:  [58-88]   Resp:  [14-20]   BP: ()/(45-80)   SpO2:  [94 %-99 %]      Recent Labs   Lab 02/09/22  0000 02/11/22  0849 02/12/22  0404   CREATININE 2.66* 2.3* 2.2*     Serum creatinine: 2.2 mg/dL (H) 02/12/22 0404  Estimated creatinine clearance: 28 mL/min (A)      Meropenem 1 g IVPB every 8 hours will be changed to 1g every 12 hours.     Pharmacist's Name: Alexy Field  Pharmacist's Extension: [phone]

## 2022-02-12 NOTE — PROGRESS NOTES
Pharmacokinetic Assessment Follow Up: IV Vancomycin    Vancomycin serum concentration assessment(s):    The random level was drawn correctly and can be used to guide therapy at this time. The measurement is within the desired definitive target range of 15 to 20 mcg/mL.    Vancomycin Regimen Plan:    Continue pulse dosing.   Give 1Y675bg IVPB today.   Re-dose when the random level is less than 20 mcg/mL, next level to be drawn at AM-labs on 2/13    Drug levels (last 3 results):  Recent Labs   Lab Result Units 02/12/22  0404   Vancomycin, Random ug/mL 18.6       Pharmacy will continue to follow and monitor vancomycin.    Please contact pharmacy at extension [phone] for questions regarding this assessment.    Thank you for the consult,   Alexy Field       Patient brief summary:  Cecile Bowen is a 71 y.o. female initiated on antimicrobial therapy with IV Vancomycin for treatment of urinary tract infection    The patient's current regimen is pulse dosing    Drug Allergies:   Review of patient's allergies indicates:  No Known Allergies    Actual Body Weight:   105.2kg    Renal Function:   Estimated Creatinine Clearance: 28 mL/min (A) (based on SCr of 2.2 mg/dL (H)).,     Dialysis Method (if applicable):  intermittent HD    CBC (last 72 hours):  Recent Labs   Lab Result Units 02/11/22  0849 02/12/22  0404   WBC K/uL 8.06 6.51   Hemoglobin g/dL 8.5* 8.1*   Hematocrit % 27.3* 26.4*   Platelets K/uL 377 335   Gran % % 52.0 53.1   Lymph % % 27.3 26.3   Mono % % 11.7 11.7   Eosinophil % % 7.2 7.5   Basophil % % 0.9 0.6   Differential Method  Automated Automated       Metabolic Panel (last 72 hours):  Recent Labs   Lab Result Units 02/11/22  0849 02/11/22  0930 02/12/22  0404   Sodium mmol/L 131*  --  132*   Potassium mmol/L 3.9  --  3.9   Chloride mmol/L 92*  --  97   CO2 mmol/L 26  --  24   Glucose mg/dL 224*  --  102   Glucose, UA   --  Negative  --    BUN mg/dL 36*  --  37*   Creatinine mg/dL 2.3*  --  2.2*   Albumin g/dL  2.3*  --   --    Total Bilirubin mg/dL 0.3  --   --    Alkaline Phosphatase U/L 110  --   --    AST U/L 19  --   --    ALT U/L 16  --   --        Vancomycin Administrations:  vancomycin given in the last 96 hours                   vancomycin 2 g in dextrose 5 % 500 mL IVPB (mg) 2,000 mg New Bag 02/11/22 0921                Microbiologic Results:  Microbiology Results (last 7 days)     Procedure Component Value Units Date/Time    Blood culture x two cultures. Draw prior to antibiotics. [[id number]] Collected: 02/11/22 0919    Order Status: Completed Specimen: Blood from Peripheral, Hand, Right Updated: 02/11/22 1715     Blood Culture, Routine No Growth to date    Narrative:      Aerobic and anaerobic    Blood culture x two cultures. Draw prior to antibiotics. [[id number]] Collected: 02/11/22 0849    Order Status: Completed Specimen: Blood from Peripheral, Antecubital, Right Updated: 02/11/22 1715     Blood Culture, Routine No Growth to date    Narrative:      Aerobic and anaerobic    Urine culture [[id number]] Collected: 02/11/22 0930    Order Status: No result Specimen: Urine Updated: 02/11/22 1007

## 2022-02-12 NOTE — PLAN OF CARE
Recommendations     1. Continue current Renal diet, add Novasource ONS if PO intake inadequate.  2. RD to monitor & follow-up.     Goals: Meet % EEN, EPN by RD f/u date  Nutrition Goal Status: new  Communication of RD Recs: reviewed with RN

## 2022-02-12 NOTE — SUBJECTIVE & OBJECTIVE
Interval History: No issues overnight. HDS and afebrile. BP improved post admission. No new complaints. Pending UA , remains on IV abx. HD today.    Review of Systems   Constitutional: Negative for activity change, chills and fever.   HENT: Negative for trouble swallowing.    Eyes: Negative for photophobia and visual disturbance.   Respiratory: Negative for cough, chest tightness and shortness of breath.    Cardiovascular: Negative for chest pain, palpitations and leg swelling.   Gastrointestinal: Negative for abdominal pain, constipation, diarrhea, nausea and vomiting.   Genitourinary: Negative for dysuria, frequency and hematuria.   Musculoskeletal: Negative for back pain, gait problem and neck pain.   Skin: Negative for rash and wound.   Neurological: Negative for dizziness, syncope, speech difficulty and light-headedness.   Psychiatric/Behavioral: Negative for agitation and confusion. The patient is not nervous/anxious.      Objective:     Vital Signs (Most Recent):  Temp: 98.3 °F (36.8 °C) (02/12/22 0715)  Pulse: 81 (02/12/22 1015)  Resp: 18 (02/12/22 0715)  BP: (!) 140/70 (02/12/22 1015)  SpO2: (!) 94 % (02/12/22 0015) Vital Signs (24h Range):  Temp:  [98 °F (36.7 °C)-98.3 °F (36.8 °C)] 98.3 °F (36.8 °C)  Pulse:  [60-88] 81  Resp:  [14-20] 18  SpO2:  [94 %-99 %] 94 %  BP: (102-159)/(45-80) 140/70     Weight: 105.2 kg (231 lb 14.8 oz)  Body mass index is 41.08 kg/m².  No intake or output data in the 24 hours ending 02/12/22 1024     Physical Exam  Vitals and nursing note reviewed.   Constitutional:       General: She is not in acute distress.     Appearance: Normal appearance. She is obese. She is not ill-appearing.   HENT:      Head: Normocephalic and atraumatic.      Right Ear: External ear normal.      Left Ear: External ear normal.   Eyes:      Extraocular Movements: Extraocular movements intact.      Conjunctiva/sclera: Conjunctivae normal.      Pupils: Pupils are equal, round, and reactive to light.    Cardiovascular:      Rate and Rhythm: Normal rate and regular rhythm.      Pulses: Normal pulses.      Heart sounds: Normal heart sounds. No murmur heard.  Pulmonary      Effort: Pulmonary effort is normal. No respiratory distress.      Breath sounds: Normal breath sounds.   Abdominal:      General: Abdomen is flat. Bowel sounds are normal.      Palpations: Abdomen is soft.      Tenderness: There is no abdominal tenderness.   Genitourinary:     Comments: suprapubic catheter in place, no purulence and erythema near site  Musculoskeletal:         General: Normal range of motion.      Cervical back: Normal range of motion and neck supple. No tenderness.      Right lower leg: No edema.      Left lower leg: No edema.   Skin:     General: Skin is warm and dry.      Capillary Refill: Capillary refill takes less than 2 seconds.      Coloration: Skin is not jaundiced.   Neurological:      General: No focal deficit present.      Mental Status: She is alert and oriented to person, place, and time. Mental status is at baseline.   Psychiatric:         Mood and Affect: Mood normal.         Behavior: Behavior normal.     Significant Labs: All pertinent labs within the past 24 hours have been reviewed.    Significant Imaging: I have reviewed all pertinent imaging results/findings within the past 24 hours.

## 2022-02-12 NOTE — PROGRESS NOTES
Pt transported to unit via hospital bed, AAOx4, tele monitor in place, nad noted. HD tx initiated via RCW perm cath, tolerated well, flows good. UF goal 2L. Dialysis days are MWF. Call placed to floor nurse @[phone] for report @ [phone], there was no answer

## 2022-02-12 NOTE — ASSESSMENT & PLAN NOTE
- SIRS: 0/4; hypotension  - Hx of ESBL UTI; completed course of meropenem  - UA with 1+ leuks, 28 RBCs, >100 WBCs  - UCx pending  - Given Vanc in ED  - suprapubic catheter changed in the ED  - Will continue Vanc and add Meropenem given last UCx

## 2022-02-12 NOTE — PLAN OF CARE
Problem: Adult Inpatient Plan of Care  Goal: Plan of Care Review  Outcome: Ongoing, Progressing  Goal: Patient-Specific Goal (Individualized)  Outcome: Ongoing, Progressing  Goal: Absence of Hospital-Acquired Illness or Injury  Outcome: Ongoing, Progressing  Goal: Optimal Comfort and Wellbeing  Outcome: Ongoing, Progressing  Goal: Readiness for Transition of Care  Outcome: Ongoing, Progressing     Problem: Bariatric Environmental Safety  Goal: Safety Maintained with Care  Outcome: Ongoing, Progressing     Problem: Device-Related Complication Risk (Hemodialysis)  Goal: Safe, Effective Therapy Delivery  Outcome: Ongoing, Progressing     Problem: Diabetes Comorbidity  Goal: Blood Glucose Level Within Targeted Range  Outcome: Ongoing, Progressing     Problem: Infection (Hemodialysis)  Goal: Absence of Infection Signs and Symptoms  Outcome: Ongoing, Progressing     Problem: Fluid and Electrolyte Imbalance (Acute Kidney Injury/Impairment)  Goal: Fluid and Electrolyte Balance  Outcome: Ongoing, Progressing     Problem: Renal Function Impairment (Acute Kidney Injury/Impairment)  Goal: Effective Renal Function  Outcome: Ongoing, Progressing     Problem: Infection  Goal: Absence of Infection Signs and Symptoms  Outcome: Ongoing, Progressing     Problem: Impaired Wound Healing  Goal: Optimal Wound Healing  Outcome: Ongoing, Progressing

## 2022-02-12 NOTE — PROGRESS NOTES
HD tx complete. 2.5 L removed over 3.5 hours, tolerated well. Blood returned via RCW perm cath. Both lumens flushed with NS, hep locked, capped and taped. Catheter dressing changed performed to catheter site using sterile technique, tolerated well. Report given to primary nurse Tita

## 2022-02-12 NOTE — PROGRESS NOTES
[facility] [facility]  Progress Note    Patient Name: Cecile Bowen  MRN: [id number]  Patient Class: OP- Observation   Admission Date: 2/11/2022  Length of Stay: 0 days  Attending Physician: Vega Livingston MD  Primary Care Provider: Kailey Navarro MD        Subjective:     Principal Problem:Acute cystitis with hematuria        HPI:  Cecile Bowen is a 71 y.o. female with PMHx significant for HTN, HLD, hx of ESBL UTI, ESRD on HD admitted to observation for hypotension, found to have a UTI. Patient was at her dialysis clinic today when she was found to have a BP of 76/43 and advised to come to the ED for evaluation. Reports her SBPs usually run in the 120s. Endorses cloudy urine in her catheter bag for the past few days, and reports it was last changed about a week ago. Denies lightheadedness, dizziness, SOB, fatigue, weakness, HA, CP, cough, abdominal pain, n/v. Patient was recently hospitalized for a ESBL UTI for which she completed her course of meropenem. Of note, patient also has a right subclavian dialysis access that was also changed about a week ago.     In the ED, BP 94/24 improved to 102/57. Remaining VSS. WBC 8.06. Lactic 1.8. Procal 0.12. Hgb 8.5 (~BL). . Cr 2.3 (~BL). Glucose 224. UA with 1+ leuks, 28 RBCs, >100 WBCs. CXR with no acute processes. Given Vanc 2g x 1.       Overview/Hospital Course:  Admitted to  for possible UTI. Cultures obtained, suprapubic catheter changes apparently in the ED, and started Abx. Nephrology consulted for HD.      Interval History: No issues overnight. HDS and afebrile. BP improved post admission. No new complaints. Pending UA , remains on IV abx. HD today.    Review of Systems   Constitutional: Negative for activity change, chills and fever.   HENT: Negative for trouble swallowing.    Eyes: Negative for photophobia and visual disturbance.   Respiratory: Negative for cough, chest tightness and shortness of  breath.    Cardiovascular: Negative for chest pain, palpitations and leg swelling.   Gastrointestinal: Negative for abdominal pain, constipation, diarrhea, nausea and vomiting.   Genitourinary: Negative for dysuria, frequency and hematuria.   Musculoskeletal: Negative for back pain, gait problem and neck pain.   Skin: Negative for rash and wound.   Neurological: Negative for dizziness, syncope, speech difficulty and light-headedness.   Psychiatric/Behavioral: Negative for agitation and confusion. The patient is not nervous/anxious.      Objective:     Vital Signs (Most Recent):  Temp: 98.3 °F (36.8 °C) (02/12/22 0715)  Pulse: 81 (02/12/22 1015)  Resp: 18 (02/12/22 0715)  BP: (!) 140/70 (02/12/22 1015)  SpO2: (!) 94 % (02/12/22 0015) Vital Signs (24h Range):  Temp:  [98 °F (36.7 °C)-98.3 °F (36.8 °C)] 98.3 °F (36.8 °C)  Pulse:  [60-88] 81  Resp:  [14-20] 18  SpO2:  [94 %-99 %] 94 %  BP: (102-159)/(45-80) 140/70     Weight: 105.2 kg (231 lb 14.8 oz)  Body mass index is 41.08 kg/m².  No intake or output data in the 24 hours ending 02/12/22 1024     Physical Exam  Vitals and nursing note reviewed.   Constitutional:       General: She is not in acute distress.     Appearance: Normal appearance. She is obese. She is not ill-appearing.   HENT:      Head: Normocephalic and atraumatic.      Right Ear: External ear normal.      Left Ear: External ear normal.   Eyes:      Extraocular Movements: Extraocular movements intact.      Conjunctiva/sclera: Conjunctivae normal.      Pupils: Pupils are equal, round, and reactive to light.   Cardiovascular:      Rate and Rhythm: Normal rate and regular rhythm.      Pulses: Normal pulses.      Heart sounds: Normal heart sounds. No murmur heard.  Pulmonary      Effort: Pulmonary effort is normal. No respiratory distress.      Breath sounds: Normal breath sounds.   Abdominal:      General: Abdomen is flat. Bowel sounds are normal.      Palpations: Abdomen is soft.      Tenderness: There is  no abdominal tenderness.   Genitourinary:     Comments: suprapubic catheter in place, no purulence and erythema near site  Musculoskeletal:         General: Normal range of motion.      Cervical back: Normal range of motion and neck supple. No tenderness.      Right lower leg: No edema.      Left lower leg: No edema.   Skin:     General: Skin is warm and dry.      Capillary Refill: Capillary refill takes less than 2 seconds.      Coloration: Skin is not jaundiced.   Neurological:      General: No focal deficit present.      Mental Status: She is alert and oriented to person, place, and time. Mental status is at baseline.   Psychiatric:         Mood and Affect: Mood normal.         Behavior: Behavior normal.     Significant Labs: All pertinent labs within the past 24 hours have been reviewed.    Significant Imaging: I have reviewed all pertinent imaging results/findings within the past 24 hours.      Assessment/Plan:      * Acute cystitis with hematuria  - SIRS: 0/4; hypotension  - Hx of ESBL UTI; completed course of meropenem  - UA with 1+ leuks, 28 RBCs, >100 WBCs  - UCx pending  - Given Vanc in ED  - suprapubic catheter changed in the ED  - Will continue Vanc and add Meropenem given last UCx    CKD (chronic kidney disease) requiring chronic dialysis  - HD MWF  - Nephrology consulted  - continue sevelamer carbonate 800mg TIDW    Pressure injury of right buttock, stage 3  - present on admission  - do not appear infection on exam  - wound care consulted, appreciate recs  - turn patient q2h      Urinary tract infection due to ESBL Klebsiella  - hx of this, awaiting urine cultures       Secondary hypertension  - hold home clonidine, Lopressor, Lasix and lisinopril for now in setting of hypotension  - restart when appropriate      A-fib  - continue Eliquis 2.5mg BID  - holding Lopressor for now in setting of hypotension, restart when appropriate  - tele    Malignant neoplasm of left breast, estrogen receptor positive  -  continue home anastrozole 1mg daily    Chronic pain  - stable      Suprapubic catheter  - exchanged in the ED      Uncontrolled type 2 diabetes mellitus with stage 4 chronic kidney disease, with long-term current use of insulin  - uncontrolled on admit  - started on Levemir 13 units daily, Novolog 4 units TIDW, SSI  - up titrate as needed  - POCT glucose ACHS  - hypoglycemic protocol  Hemoglobin A1C   Date Value Ref Range Status   01/03/2022 5.4 4.8 - 5.9 % Final   12/21/2021 5.3 4.0 - 5.6 % Final     Comment:     ADA Screening Guidelines:  5.7-6.4%  Consistent with prediabetes  >or=6.5%  Consistent with diabetes    High levels of fetal hemoglobin interfere with the HbA1C  assay. Heterozygous hemoglobin variants (HbS, HgC, etc)do  not significantly interfere with this assay.   However, presence of multiple variants may affect accuracy.     09/05/2021 6.6 (H) 4.0 - 5.6 % Final     Comment:     ADA Screening Guidelines:  5.7-6.4%  Consistent with prediabetes  >or=6.5%  Consistent with diabetes    High levels of fetal hemoglobin interfere with the HbA1C  assay. Heterozygous hemoglobin variants (HbS, HgC, etc)do  not significantly interfere with this assay.   However, presence of multiple variants may affect accuracy.           Mixed migraine and muscle contraction headache  - stable  - home Fioricet and sumatriptan prn      Neurogenic bladder  - noted, has suprapubic catheter       Urinary retention  - continue home oxybutynin 10mg daily    Recurrent urinary tract infection  - see above      Hyperlipidemia associated with type 2 diabetes mellitus  - continue Lipitor 80mg daily    Hypothyroidism  - continue home synthroid 50mcg      VTE Risk Mitigation (From admission, onward)         Ordered     apixaban tablet 2.5 mg  2 times daily         02/11/22 1342     heparin (porcine) injection 5,000 Units  Every 8 hours         02/11/22 1132     IP VTE HIGH RISK PATIENT  Once         02/11/22 1132     Place sequential  compression device  Until discontinued         02/11/22 1132                Discharge Planning   FLORENTINO: 2/13/2022     Code Status: Full Code   Is the patient medically ready for discharge?: No    Reason for patient still in hospital (select all that apply): Patient trending condition           Vega Livingston MD  Department of Hospital Medicine   [facility] - Telemetry Stepdown ([place]-[facility])

## 2022-02-13 LAB
BASOPHILS # BLD AUTO: 0.04 K/UL (ref 0–0.2)
BASOPHILS NFR BLD: 0.7 % (ref 0–1.9)
DIFFERENTIAL METHOD: ABNORMAL
EOSINOPHIL # BLD AUTO: 0.4 K/UL (ref 0–0.5)
EOSINOPHIL NFR BLD: 7.3 % (ref 0–8)
ERYTHROCYTE [DISTWIDTH] IN BLOOD BY AUTOMATED COUNT: 15.3 % (ref 11.5–14.5)
GLUCOSE SERPL-MCNC: 117 MG/DL (ref 70–110)
HCT VFR BLD AUTO: 29.7 % (ref 37–48.5)
HGB BLD-MCNC: 9.2 G/DL (ref 12–16)
IMM GRANULOCYTES # BLD AUTO: 0.04 K/UL (ref 0–0.04)
IMM GRANULOCYTES NFR BLD AUTO: 0.7 % (ref 0–0.5)
LYMPHOCYTES # BLD AUTO: 1.9 K/UL (ref 1–4.8)
LYMPHOCYTES NFR BLD: 33.2 % (ref 18–48)
MCH RBC QN AUTO: 29.4 PG (ref 27–31)
MCHC RBC AUTO-ENTMCNC: 31 G/DL (ref 32–36)
MCV RBC AUTO: 95 FL (ref 82–98)
MONOCYTES # BLD AUTO: 0.8 K/UL (ref 0.3–1)
MONOCYTES NFR BLD: 14.9 % (ref 4–15)
NEUTROPHILS # BLD AUTO: 2.4 K/UL (ref 1.8–7.7)
NEUTROPHILS NFR BLD: 43.2 % (ref 38–73)
NRBC BLD-RTO: 0 /100 WBC
PLATELET # BLD AUTO: 347 K/UL (ref 150–450)
PMV BLD AUTO: 9.5 FL (ref 9.2–12.9)
POCT GLUCOSE: 122 MG/DL (ref 70–110)
POCT GLUCOSE: 132 MG/DL (ref 70–110)
POCT GLUCOSE: 140 MG/DL (ref 70–110)
POCT GLUCOSE: 188 MG/DL (ref 70–110)
RBC # BLD AUTO: 3.13 M/UL (ref 4–5.4)
VANCOMYCIN SERPL-MCNC: 16.4 UG/ML
WBC # BLD AUTO: 5.58 K/UL (ref 3.9–12.7)

## 2022-02-13 PROCEDURE — 99226 PR SUBSEQUENT OBSERVATION CARE,LEVEL III: CPT | Mod: HCNC,,, | Performed by: STUDENT IN AN ORGANIZED HEALTH CARE EDUCATION/TRAINING PROGRAM

## 2022-02-13 PROCEDURE — 94761 N-INVAS EAR/PLS OXIMETRY MLT: CPT | Mod: HCNC

## 2022-02-13 PROCEDURE — 63600175 PHARM REV CODE 636 W HCPCS: Mod: HCNC | Performed by: STUDENT IN AN ORGANIZED HEALTH CARE EDUCATION/TRAINING PROGRAM

## 2022-02-13 PROCEDURE — 25000003 PHARM REV CODE 250: Mod: HCNC | Performed by: HOSPITALIST

## 2022-02-13 PROCEDURE — G0378 HOSPITAL OBSERVATION PER HR: HCPCS | Mod: HCNC

## 2022-02-13 PROCEDURE — 63600175 PHARM REV CODE 636 W HCPCS: Mod: HCNC | Performed by: PHYSICIAN ASSISTANT

## 2022-02-13 PROCEDURE — 85025 COMPLETE CBC W/AUTO DIFF WBC: CPT | Mod: HCNC | Performed by: PHYSICIAN ASSISTANT

## 2022-02-13 PROCEDURE — 96372 THER/PROPH/DIAG INJ SC/IM: CPT | Mod: HCNC | Performed by: PHYSICIAN ASSISTANT

## 2022-02-13 PROCEDURE — 25000003 PHARM REV CODE 250: Mod: HCNC | Performed by: STUDENT IN AN ORGANIZED HEALTH CARE EDUCATION/TRAINING PROGRAM

## 2022-02-13 PROCEDURE — 25000003 PHARM REV CODE 250: Mod: HCNC | Performed by: PHYSICIAN ASSISTANT

## 2022-02-13 PROCEDURE — 80202 ASSAY OF VANCOMYCIN: CPT | Mod: HCNC | Performed by: INTERNAL MEDICINE

## 2022-02-13 PROCEDURE — 99226 PR SUBSEQUENT OBSERVATION CARE,LEVEL III: ICD-10-PCS | Mod: HCNC,,, | Performed by: STUDENT IN AN ORGANIZED HEALTH CARE EDUCATION/TRAINING PROGRAM

## 2022-02-13 PROCEDURE — 63600175 PHARM REV CODE 636 W HCPCS: Mod: HCNC | Performed by: INTERNAL MEDICINE

## 2022-02-13 RX ORDER — CLONIDINE HYDROCHLORIDE 0.2 MG/1
0.2 TABLET ORAL EVERY 8 HOURS PRN
Status: DISCONTINUED | OUTPATIENT
Start: 2022-02-13 | End: 2022-02-23

## 2022-02-13 RX ORDER — HYDROCODONE BITARTRATE AND ACETAMINOPHEN 10; 325 MG/1; MG/1
1 TABLET ORAL EVERY 6 HOURS PRN
Status: DISCONTINUED | OUTPATIENT
Start: 2022-02-13 | End: 2022-02-25 | Stop reason: HOSPADM

## 2022-02-13 RX ORDER — LISINOPRIL 10 MG/1
10 TABLET ORAL DAILY
Status: DISCONTINUED | OUTPATIENT
Start: 2022-02-13 | End: 2022-02-15

## 2022-02-13 RX ORDER — METOPROLOL TARTRATE 25 MG/1
25 TABLET, FILM COATED ORAL 2 TIMES DAILY
Status: DISCONTINUED | OUTPATIENT
Start: 2022-02-13 | End: 2022-02-25 | Stop reason: HOSPADM

## 2022-02-13 RX ADMIN — METOPROLOL TARTRATE 25 MG: 25 TABLET, FILM COATED ORAL at 12:02

## 2022-02-13 RX ADMIN — SEVELAMER CARBONATE 800 MG: 800 TABLET, FILM COATED ORAL at 09:02

## 2022-02-13 RX ADMIN — FAMOTIDINE 20 MG: 20 TABLET ORAL at 09:02

## 2022-02-13 RX ADMIN — MEROPENEM AND SODIUM CHLORIDE 1 G: 1 INJECTION, SOLUTION INTRAVENOUS at 06:02

## 2022-02-13 RX ADMIN — INSULIN DETEMIR 13 UNITS: 100 INJECTION, SOLUTION SUBCUTANEOUS at 08:02

## 2022-02-13 RX ADMIN — MEROPENEM AND SODIUM CHLORIDE 1 G: 1 INJECTION, SOLUTION INTRAVENOUS at 05:02

## 2022-02-13 RX ADMIN — HYDROCODONE BITARTRATE AND ACETAMINOPHEN 1 TABLET: 10; 325 TABLET ORAL at 03:02

## 2022-02-13 RX ADMIN — SEVELAMER CARBONATE 800 MG: 800 TABLET, FILM COATED ORAL at 05:02

## 2022-02-13 RX ADMIN — INSULIN ASPART 4 UNITS: 100 INJECTION, SOLUTION INTRAVENOUS; SUBCUTANEOUS at 08:02

## 2022-02-13 RX ADMIN — LEVOTHYROXINE SODIUM 50 MCG: 50 TABLET ORAL at 05:02

## 2022-02-13 RX ADMIN — HEPARIN SODIUM 5000 UNITS: 5000 INJECTION INTRAVENOUS; SUBCUTANEOUS at 05:02

## 2022-02-13 RX ADMIN — INSULIN ASPART 4 UNITS: 100 INJECTION, SOLUTION INTRAVENOUS; SUBCUTANEOUS at 12:02

## 2022-02-13 RX ADMIN — VANCOMYCIN HYDROCHLORIDE 500 MG: 500 INJECTION, POWDER, LYOPHILIZED, FOR SOLUTION INTRAVENOUS at 10:02

## 2022-02-13 RX ADMIN — TRAZODONE HYDROCHLORIDE 100 MG: 100 TABLET ORAL at 08:02

## 2022-02-13 RX ADMIN — SEVELAMER CARBONATE 800 MG: 800 TABLET, FILM COATED ORAL at 12:02

## 2022-02-13 RX ADMIN — CLONIDINE HYDROCHLORIDE 0.2 MG: 0.2 TABLET ORAL at 10:02

## 2022-02-13 RX ADMIN — SUMATRIPTAN SUCCINATE 100 MG: 50 TABLET ORAL at 05:02

## 2022-02-13 RX ADMIN — Medication 400 MG: at 08:02

## 2022-02-13 RX ADMIN — METOPROLOL TARTRATE 25 MG: 25 TABLET, FILM COATED ORAL at 08:02

## 2022-02-13 RX ADMIN — OXYBUTYNIN CHLORIDE 10 MG: 10 TABLET, EXTENDED RELEASE ORAL at 09:02

## 2022-02-13 RX ADMIN — INSULIN ASPART 4 UNITS: 100 INJECTION, SOLUTION INTRAVENOUS; SUBCUTANEOUS at 05:02

## 2022-02-13 RX ADMIN — HEPARIN SODIUM 5000 UNITS: 5000 INJECTION INTRAVENOUS; SUBCUTANEOUS at 10:02

## 2022-02-13 RX ADMIN — HYDROCODONE BITARTRATE AND ACETAMINOPHEN 1 TABLET: 10; 325 TABLET ORAL at 09:02

## 2022-02-13 RX ADMIN — APIXABAN 2.5 MG: 2.5 TABLET, FILM COATED ORAL at 09:02

## 2022-02-13 RX ADMIN — METHOCARBAMOL 750 MG: 750 TABLET ORAL at 08:02

## 2022-02-13 RX ADMIN — HEPARIN SODIUM 5000 UNITS: 5000 INJECTION INTRAVENOUS; SUBCUTANEOUS at 02:02

## 2022-02-13 RX ADMIN — LISINOPRIL 10 MG: 10 TABLET ORAL at 12:02

## 2022-02-13 RX ADMIN — Medication 400 MG: at 09:02

## 2022-02-13 RX ADMIN — ANASTROZOLE 1 MG: 1 TABLET, COATED ORAL at 09:02

## 2022-02-13 RX ADMIN — VENLAFAXINE HYDROCHLORIDE 150 MG: 150 CAPSULE, EXTENDED RELEASE ORAL at 09:02

## 2022-02-13 RX ADMIN — METHOCARBAMOL 750 MG: 750 TABLET ORAL at 03:02

## 2022-02-13 RX ADMIN — APIXABAN 2.5 MG: 2.5 TABLET, FILM COATED ORAL at 08:02

## 2022-02-13 RX ADMIN — ATORVASTATIN CALCIUM 80 MG: 40 TABLET, FILM COATED ORAL at 09:02

## 2022-02-13 NOTE — ASSESSMENT & PLAN NOTE
- With elevated BP- restart lopressor, lisinopril and clonidine  - Holding lasix   - Titrate BP meds accordingly

## 2022-02-13 NOTE — PROGRESS NOTES
Therapy with vanc complete and/or consult discontinued by provider.  Pharmacy will sign off, please re-consult as needed.    Lakhwinder Zhu PharmD, BCPS

## 2022-02-13 NOTE — SUBJECTIVE & OBJECTIVE
Interval History: Patient was seen and evaluated this am. Elevated BP this am- >190/80s manually. Asymptomatic- no chest pain, sob, nausea, vomiting, fevers, chills, night sweats. Suprapubic catheter in place w/no issues or complications. Underwent HD yesterday w/3L off.     Objective:     Vital Signs (Most Recent):  Temp: 97.7 °F (36.5 °C) (02/13/22 0725)  Pulse: 77 (02/13/22 0725)  Resp: 18 (02/13/22 0902)  BP: (!) 194/84 (02/13/22 0900)  SpO2: 96 % (02/13/22 0725) Vital Signs (24h Range):  Temp:  [97.2 °F (36.2 °C)-99.1 °F (37.3 °C)] 97.7 °F (36.5 °C)  Pulse:  [73-88] 77  Resp:  [16-20] 18  SpO2:  [96 %-98 %] 96 %  BP: (138-194)/(63-88) 194/84     Weight: 105.2 kg (231 lb 14.8 oz)  Body mass index is 41.08 kg/m².    Intake/Output Summary (Last 24 hours) at 2/13/2022 1132  Last data filed at 2/13/2022 0508  Gross per 24 hour   Intake 1119.21 ml   Output 2450 ml   Net -1330.79 ml      Physical Exam  Gen: in NAD, appears stated age  Neuro: AAOx3, motor, sensory, and strength grossly intact BL  HEENT: NTNC, EOMI, PERRL, MMM  CVS: RRR, no m/r/g; S1/S2 auscultated with no S3 or S4; capillary refill < 2 sec  Chest: TDC of right chest wall  Resp: lungs CTAB, no w/r/r; no belabored breathing or accessory muscle use appreciated   Abd: BS+ in all 4 quadrants; NTND, soft to palpation; no organomegaly appreciated   : suprapubic catheter in place w/pink/faintly bloody output- no surrounding purulence or erythema surrounding site  Extrem: pulses full, equal, and regular over all 4 extremities; no UE or LE edema BL    Significant Labs:   All pertinent labs within the past 24 hours have been reviewed.  Blood Culture: No results for input(s): LABBLOO in the last 48 hours.  CBC:   Recent Labs   Lab 02/12/22  0404 02/13/22  0514   WBC 6.51 5.58   HGB 8.1* 9.2*   HCT 26.4* 29.7*    347     CMP:   Recent Labs   Lab 02/12/22  0404   *   K 3.9   CL 97   CO2 24      BUN 37*   CREATININE 2.2*   CALCIUM 9.1   ANIONGAP  11   EGFRNONAA 22.2*     Urine Culture: No results for input(s): LABURIN in the last 48 hours.  Urine Studies: No results for input(s): COLORU, APPEARANCEUA, PHUR, SPECGRAV, PROTEINUA, GLUCUA, KETONESU, BILIRUBINUA, OCCULTUA, NITRITE, UROBILINOGEN, LEUKOCYTESUR, RBCUA, WBCUA, BACTERIA, SQUAMEPITHEL, HYALINECASTS in the last 48 hours.    Invalid input(s): WRIGHTSUR    Significant Imaging: I have reviewed all pertinent imaging results/findings within the past 24 hours.     CXR 02/11:  Impression:     No significant intrathoracic abnormality or detrimental interval change in the appearance of the chest since 01/21/2022 at 13:20 appreciated, noting that the current examination demonstrates a poorer inspiratory depth level than that prior study.

## 2022-02-13 NOTE — ASSESSMENT & PLAN NOTE
- SIRS: 0/4; hypotension  - Hx of ESBL UTI; completed course of meropenem  - UA with 1+ leuks, 28 RBCs, >100 WBCs  - UCx w/GNR >100,000- susceptibilities pending   - Given Vanc in ED   - suprapubic catheter changed in the ED  - Will stop vanc, continue meropenem based on last UCx

## 2022-02-13 NOTE — PROGRESS NOTES
[facility] - Telemetry [facility] ([address] Medicine  Progress Note    Patient Name: Cecile Bowen  MRN: [id number]  Patient Class: OP- Observation   Admission Date: 2/11/2022  Length of Stay: 0 days  Attending Physician: Autumn Hoover MD  Primary Care Provider: Kailey Navarro MD        Subjective:     Principal Problem:Acute cystitis with hematuria        HPI:  Cecile Bowen is a 71 y.o. female with PMHx significant for HTN, HLD, hx of ESBL UTI, ESRD on HD admitted to observation for hypotension, found to have a UTI. Patient was at her dialysis clinic today when she was found to have a BP of 76/43 and advised to come to the ED for evaluation. Reports her SBPs usually run in the 120s. Endorses cloudy urine in her catheter bag for the past few days, and reports it was last changed about a week ago. Denies lightheadedness, dizziness, SOB, fatigue, weakness, HA, CP, cough, abdominal pain, n/v. Patient was recently hospitalized for a ESBL UTI for which she completed her course of meropenem. Of note, patient also has a right subclavian dialysis access that was also changed about a week ago.     In the ED, BP 94/24 improved to 102/57. Remaining VSS. WBC 8.06. Lactic 1.8. Procal 0.12. Hgb 8.5 (~BL). . Cr 2.3 (~BL). Glucose 224. UA with 1+ leuks, 28 RBCs, >100 WBCs. CXR with no acute processes. Given Vanc 2g x 1.       Overview/Hospital Course:  Admitted to  for possible UTI. Cultures obtained, suprapubic catheter changes apparently in the ED, and started Abx. Nephrology consulted for HD. Pt continued on broad antibiotics w/improvement in signs and symptoms. Awaiting final Ucx results.       Interval History: Patient was seen and evaluated this am. Elevated BP this am- >190/80s manually. Asymptomatic- no chest pain, sob, nausea, vomiting, fevers, chills, night sweats. Suprapubic catheter in place w/no issues or complications. Underwent HD yesterday w/3L off.     Objective:     Vital Signs  (Most Recent):  Temp: 97.7 °F (36.5 °C) (02/13/22 0725)  Pulse: 77 (02/13/22 0725)  Resp: 18 (02/13/22 0902)  BP: (!) 194/84 (02/13/22 0900)  SpO2: 96 % (02/13/22 0725) Vital Signs (24h Range):  Temp:  [97.2 °F (36.2 °C)-99.1 °F (37.3 °C)] 97.7 °F (36.5 °C)  Pulse:  [73-88] 77  Resp:  [16-20] 18  SpO2:  [96 %-98 %] 96 %  BP: (138-194)/(63-88) 194/84     Weight: 105.2 kg (231 lb 14.8 oz)  Body mass index is 41.08 kg/m².    Intake/Output Summary (Last 24 hours) at 2/13/2022 1132  Last data filed at 2/13/2022 0508  Gross per 24 hour   Intake 1119.21 ml   Output 2450 ml   Net -1330.79 ml      Physical Exam  Gen: in NAD, appears stated age  Neuro: AAOx3, motor, sensory, and strength grossly intact BL  HEENT: NTNC, EOMI, PERRL, MMM  CVS: RRR, no m/r/g; S1/S2 auscultated with no S3 or S4; capillary refill < 2 sec  Chest: TDC of right chest wall  Resp: lungs CTAB, no w/r/r; no belabored breathing or accessory muscle use appreciated   Abd: BS+ in all 4 quadrants; NTND, soft to palpation; no organomegaly appreciated   : suprapubic catheter in place w/pink/faintly bloody output- no surrounding purulence or erythema surrounding site  Extrem: pulses full, equal, and regular over all 4 extremities; no UE or LE edema BL    Significant Labs:   All pertinent labs within the past 24 hours have been reviewed.  Blood Culture: No results for input(s): LABBLOO in the last 48 hours.  CBC:   Recent Labs   Lab 02/12/22  0404 02/13/22  0514   WBC 6.51 5.58   HGB 8.1* 9.2*   HCT 26.4* 29.7*    347     CMP:   Recent Labs   Lab 02/12/22  0404   *   K 3.9   CL 97   CO2 24      BUN 37*   CREATININE 2.2*   CALCIUM 9.1   ANIONGAP 11   EGFRNONAA 22.2*     Urine Culture: No results for input(s): LABURIN in the last 48 hours.  Urine Studies: No results for input(s): COLORU, APPEARANCEUA, PHUR, SPECGRAV, PROTEINUA, GLUCUA, KETONESU, BILIRUBINUA, OCCULTUA, NITRITE, UROBILINOGEN, LEUKOCYTESUR, RBCUA, WBCUA, BACTERIA, SQUAMEPITHEL,  HYALINECASTS in the last 48 hours.    Invalid input(s): ANTONIO    Significant Imaging: I have reviewed all pertinent imaging results/findings within the past 24 hours.     CXR 02/11:  Impression:     No significant intrathoracic abnormality or detrimental interval change in the appearance of the chest since 01/21/2022 at 13:20 appreciated, noting that the current examination demonstrates a poorer inspiratory depth level than that prior study.      Assessment/Plan:      * Acute cystitis with hematuria  - SIRS: 0/4; hypotension  - Hx of ESBL UTI; completed course of meropenem  - UA with 1+ leuks, 28 RBCs, >100 WBCs  - UCx w/GNR >100,000- susceptibilities pending   - Given Vanc in ED   - suprapubic catheter changed in the ED  - Will stop vanc, continue meropenem based on last UCx    CKD (chronic kidney disease) requiring chronic dialysis  - HD MWF  - Nephrology following  - continue sevelamer carbonate 800mg TIDW    Pressure injury of right buttock, stage 3  - present on admission  - do not appear infection on exam  - wound care consulted, appreciate recs  - turn patient q2h    Urinary tract infection due to ESBL Klebsiella  - hx of this, awaiting urine cultures     Secondary hypertension  - With elevated BP- restart lopressor, lisinopril and clonidine  - Holding lasix   - Titrate BP meds accordingly    A-fib  - continue Eliquis 2.5mg BID  - Restart lopressor 25mg BID today   - tele    Malignant neoplasm of left breast, estrogen receptor positive  - continue home anastrozole 1mg daily    Chronic pain  - follows w/pain specialist  - worsening back pain today  - begin norco 10mg q6h for mod pain    Suprapubic catheter  - exchanged in the ED    Uncontrolled type 2 diabetes mellitus with stage 4 chronic kidney disease, with long-term current use of insulin  - uncontrolled on admit  - Continue on Levemir 13 units daily, Novolog 4 units TIDW, SSI  - up titrate as needed  - POCT glucose ACHS  - hypoglycemic  protocol    Hemoglobin A1C   Date Value Ref Range Status   01/03/2022 5.4 4.8 - 5.9 % Final   12/21/2021 5.3 4.0 - 5.6 % Final     Comment:     ADA Screening Guidelines:  5.7-6.4%  Consistent with prediabetes  >or=6.5%  Consistent with diabetes    High levels of fetal hemoglobin interfere with the HbA1C  assay. Heterozygous hemoglobin variants (HbS, HgC, etc)do  not significantly interfere with this assay.   However, presence of multiple variants may affect accuracy.     09/05/2021 6.6 (H) 4.0 - 5.6 % Final     Comment:     ADA Screening Guidelines:  5.7-6.4%  Consistent with prediabetes  >or=6.5%  Consistent with diabetes    High levels of fetal hemoglobin interfere with the HbA1C  assay. Heterozygous hemoglobin variants (HbS, HgC, etc)do  not significantly interfere with this assay.   However, presence of multiple variants may affect accuracy.         Mixed migraine and muscle contraction headache  - stable  - home Fioricet and sumatriptan prn    Neurogenic bladder  - noted, has suprapubic catheter     Urinary retention  - continue home oxybutynin 10mg daily    Recurrent urinary tract infection  - see above    Hyperlipidemia associated with type 2 diabetes mellitus  - continue Lipitor 80mg daily    Hypothyroidism  - continue home synthroid 50mcg    VTE Risk Mitigation (From admission, onward)         Ordered     heparin (porcine) injection 1,000 Units  As needed (PRN)         02/12/22 1104     apixaban tablet 2.5 mg  2 times daily         02/11/22 1342     heparin (porcine) injection 5,000 Units  Every 8 hours         02/11/22 1132     IP VTE HIGH RISK PATIENT  Once         02/11/22 1132     Place sequential compression device  Until discontinued         02/11/22 1132                Discharge Planning   FLORENTINO: 2/13/2022     Code Status: Full Code   Is the patient medically ready for discharge?: No    Reason for patient still in hospital (select all that apply): Patient trending condition                        Autumn Hoover MD  Department of Hospital Medicine   [facility] - Telemetry Stepdown ([place]-[facility])

## 2022-02-13 NOTE — ASSESSMENT & PLAN NOTE
- uncontrolled on admit  - Continue on Levemir 13 units daily, Novolog 4 units TIDW, SSI  - up titrate as needed  - POCT glucose ACHS  - hypoglycemic protocol    Hemoglobin A1C   Date Value Ref Range Status   01/03/2022 5.4 4.8 - 5.9 % Final   12/21/2021 5.3 4.0 - 5.6 % Final     Comment:     ADA Screening Guidelines:  5.7-6.4%  Consistent with prediabetes  >or=6.5%  Consistent with diabetes    High levels of fetal hemoglobin interfere with the HbA1C  assay. Heterozygous hemoglobin variants (HbS, HgC, etc)do  not significantly interfere with this assay.   However, presence of multiple variants may affect accuracy.     09/05/2021 6.6 (H) 4.0 - 5.6 % Final     Comment:     ADA Screening Guidelines:  5.7-6.4%  Consistent with prediabetes  >or=6.5%  Consistent with diabetes    High levels of fetal hemoglobin interfere with the HbA1C  assay. Heterozygous hemoglobin variants (HbS, HgC, etc)do  not significantly interfere with this assay.   However, presence of multiple variants may affect accuracy.

## 2022-02-13 NOTE — PLAN OF CARE
Care provided to patient as per POC and as charted.  Pt. Slept about 2 consecutive hours overnight and reports that is her baseline.  No acute events overnight.   Problem: Adult Inpatient Plan of Care  Goal: Plan of Care Review  Outcome: Ongoing, Progressing  Goal: Patient-Specific Goal (Individualized)  Outcome: Ongoing, Progressing  Goal: Absence of Hospital-Acquired Illness or Injury  Outcome: Ongoing, Progressing  Goal: Optimal Comfort and Wellbeing  Outcome: Ongoing, Progressing  Goal: Readiness for Transition of Care  Outcome: Ongoing, Progressing     Problem: Bariatric Environmental Safety  Goal: Safety Maintained with Care  Outcome: Ongoing, Progressing     Problem: Device-Related Complication Risk (Hemodialysis)  Goal: Safe, Effective Therapy Delivery  Outcome: Ongoing, Progressing     Problem: Hemodynamic Instability (Hemodialysis)  Goal: Effective Tissue Perfusion  Outcome: Ongoing, Progressing     Problem: Infection (Hemodialysis)  Goal: Absence of Infection Signs and Symptoms  Outcome: Ongoing, Progressing     Problem: Diabetes Comorbidity  Goal: Blood Glucose Level Within Targeted Range  Outcome: Ongoing, Progressing     Problem: Fluid and Electrolyte Imbalance (Acute Kidney Injury/Impairment)  Goal: Fluid and Electrolyte Balance  Outcome: Ongoing, Progressing     Problem: Oral Intake Inadequate (Acute Kidney Injury/Impairment)  Goal: Optimal Nutrition Intake  Outcome: Ongoing, Progressing     Problem: Renal Function Impairment (Acute Kidney Injury/Impairment)  Goal: Effective Renal Function  Outcome: Ongoing, Progressing     Problem: Infection  Goal: Absence of Infection Signs and Symptoms  Outcome: Ongoing, Progressing     Problem: Impaired Wound Healing  Goal: Optimal Wound Healing  Outcome: Ongoing, Progressing     Problem: Skin Injury Risk Increased  Goal: Skin Health and Integrity  Outcome: Ongoing, Progressing     Problem: Electrolyte Imbalance (Chronic Kidney Disease)  Goal: Electrolyte  Balance  Outcome: Ongoing, Progressing

## 2022-02-13 NOTE — PROGRESS NOTES
Pharmacokinetic Assessment Follow Up: IV Vancomycin    Vancomycin serum concentration assessment(s):    The random level was drawn correctly and can be used to guide therapy at this time. The measurement is within the desired definitive target range of 15 to 20 mcg/mL.    Vancomycin Regimen Plan    Continue pulse dosing.   Give 7L202ab IVPB today.   Re-dose when the random level is less than 20 mcg/mL, next level to be drawn at AM-labs on 2/14    Drug levels (last 3 results):  Recent Labs   Lab Result Units 02/12/22 0404 02/13/22  0514   Vancomycin, Random ug/mL 18.6 16.4       Pharmacy will continue to follow and monitor vancomycin.    Please contact pharmacy at extension [phone] for questions regarding this assessment.    Thank you for the consult,   Alexy Field       Patient brief summary:  Cecile Bowen is a 71 y.o. female initiated on antimicrobial therapy with IV Vancomycin for treatment of urinary tract infection    The patient's current regimen is Pulse dosing    Drug Allergies:   Review of patient's allergies indicates:  No Known Allergies    Actual Body Weight:   105.2kg    Renal Function:   Estimated Creatinine Clearance: 28 mL/min (A) (based on SCr of 2.2 mg/dL (H)).,     Dialysis Method (if applicable):  intermittent HD    CBC (last 72 hours):  Recent Labs   Lab Result Units 02/11/22  0849 02/12/22 0404 02/13/22  0514   WBC K/uL 8.06 6.51 5.58   Hemoglobin g/dL 8.5* 8.1* 9.2*   Hematocrit % 27.3* 26.4* 29.7*   Platelets K/uL 377 335 347   Gran % % 52.0 53.1 43.2   Lymph % % 27.3 26.3 33.2   Mono % % 11.7 11.7 14.9   Eosinophil % % 7.2 7.5 7.3   Basophil % % 0.9 0.6 0.7   Differential Method  Automated Automated Automated       Metabolic Panel (last 72 hours):  Recent Labs   Lab Result Units 02/11/22  0849 02/11/22  0930 02/12/22  0404   Sodium mmol/L 131*  --  132*   Potassium mmol/L 3.9  --  3.9   Chloride mmol/L 92*  --  97   CO2 mmol/L 26  --  24   Glucose mg/dL 224*  --  102   Glucose, UA   --   Negative  --    BUN mg/dL 36*  --  37*   Creatinine mg/dL 2.3*  --  2.2*   Albumin g/dL 2.3*  --   --    Total Bilirubin mg/dL 0.3  --   --    Alkaline Phosphatase U/L 110  --   --    AST U/L 19  --   --    ALT U/L 16  --   --    Phosphorus mg/dL  --   --  5.0*       Vancomycin Administrations:  vancomycin given in the last 96 hours                   vancomycin 500 mg in dextrose 5 % 100 mL IVPB (ready to mix system) (mg) 500 mg New Bag 02/12/22 1331    vancomycin 2 g in dextrose 5 % 500 mL IVPB (mg) 2,000 mg New Bag 02/11/22 0921                Microbiologic Results:  Microbiology Results (last 7 days)     Procedure Component Value Units Date/Time    Urine culture [[id number]]  (Abnormal) Collected: 02/11/22 0930    Order Status: Completed Specimen: Urine Updated: 02/12/22 1234     Urine Culture, Routine GRAM NEGATIVE FERNANDEZ  >100,000 cfu/ml  Identification and susceptibility pending      Narrative:      Specimen Source->Urine    Blood culture x two cultures. Draw prior to antibiotics. [[id number]] Collected: 02/11/22 0919    Order Status: Completed Specimen: Blood from Peripheral, Hand, Right Updated: 02/12/22 1012     Blood Culture, Routine No Growth to date      No Growth to date    Narrative:      Aerobic and anaerobic    Blood culture x two cultures. Draw prior to antibiotics. [[id number]] Collected: 02/11/22 0849    Order Status: Completed Specimen: Blood from Peripheral, Antecubital, Right Updated: 02/12/22 1012     Blood Culture, Routine No Growth to date      No Growth to date    Narrative:      Aerobic and anaerobic

## 2022-02-13 NOTE — NURSING
No acute events during shift. VSS. Fall and safety precautions maintained. Telemetry maintained.POC discussed with patient. Will continue to monitor.

## 2022-02-14 LAB
ANION GAP SERPL CALC-SCNC: 10 MMOL/L (ref 8–16)
BASOPHILS # BLD AUTO: 0.07 K/UL (ref 0–0.2)
BASOPHILS NFR BLD: 1.2 % (ref 0–1.9)
BUN SERPL-MCNC: 21 MG/DL (ref 8–23)
CALCIUM SERPL-MCNC: 8.8 MG/DL (ref 8.7–10.5)
CHLORIDE SERPL-SCNC: 99 MMOL/L (ref 95–110)
CO2 SERPL-SCNC: 22 MMOL/L (ref 23–29)
CREAT SERPL-MCNC: 1.7 MG/DL (ref 0.5–1.4)
DIFFERENTIAL METHOD: ABNORMAL
EOSINOPHIL # BLD AUTO: 0.5 K/UL (ref 0–0.5)
EOSINOPHIL NFR BLD: 7.8 % (ref 0–8)
ERYTHROCYTE [DISTWIDTH] IN BLOOD BY AUTOMATED COUNT: 14.9 % (ref 11.5–14.5)
EST. GFR  (AFRICAN AMERICAN): 35 ML/MIN/1.73 M^2
EST. GFR  (NON AFRICAN AMERICAN): 30.3 ML/MIN/1.73 M^2
GLUCOSE SERPL-MCNC: 107 MG/DL (ref 70–110)
GLUCOSE SERPL-MCNC: 132 MG/DL (ref 70–110)
HCT VFR BLD AUTO: 30.4 % (ref 37–48.5)
HGB BLD-MCNC: 9.2 G/DL (ref 12–16)
IMM GRANULOCYTES # BLD AUTO: 0.05 K/UL (ref 0–0.04)
IMM GRANULOCYTES NFR BLD AUTO: 0.9 % (ref 0–0.5)
LYMPHOCYTES # BLD AUTO: 1.9 K/UL (ref 1–4.8)
LYMPHOCYTES NFR BLD: 32.5 % (ref 18–48)
MCH RBC QN AUTO: 29 PG (ref 27–31)
MCHC RBC AUTO-ENTMCNC: 30.3 G/DL (ref 32–36)
MCV RBC AUTO: 96 FL (ref 82–98)
MONOCYTES # BLD AUTO: 0.8 K/UL (ref 0.3–1)
MONOCYTES NFR BLD: 14.1 % (ref 4–15)
NEUTROPHILS # BLD AUTO: 2.5 K/UL (ref 1.8–7.7)
NEUTROPHILS NFR BLD: 43.5 % (ref 38–73)
NRBC BLD-RTO: 0 /100 WBC
PLATELET # BLD AUTO: 332 K/UL (ref 150–450)
PMV BLD AUTO: 9.6 FL (ref 9.2–12.9)
POCT GLUCOSE: 104 MG/DL (ref 70–110)
POCT GLUCOSE: 117 MG/DL (ref 70–110)
POCT GLUCOSE: 119 MG/DL (ref 70–110)
POCT GLUCOSE: 144 MG/DL (ref 70–110)
POTASSIUM SERPL-SCNC: 4 MMOL/L (ref 3.5–5.1)
RBC # BLD AUTO: 3.17 M/UL (ref 4–5.4)
SODIUM SERPL-SCNC: 131 MMOL/L (ref 136–145)
WBC # BLD AUTO: 5.76 K/UL (ref 3.9–12.7)

## 2022-02-14 PROCEDURE — 63600175 PHARM REV CODE 636 W HCPCS: Mod: HCNC | Performed by: PHYSICIAN ASSISTANT

## 2022-02-14 PROCEDURE — 63600175 PHARM REV CODE 636 W HCPCS: Mod: HCNC | Performed by: INTERNAL MEDICINE

## 2022-02-14 PROCEDURE — 25000003 PHARM REV CODE 250: Mod: HCNC | Performed by: STUDENT IN AN ORGANIZED HEALTH CARE EDUCATION/TRAINING PROGRAM

## 2022-02-14 PROCEDURE — 99232 PR SUBSEQUENT HOSPITAL CARE,LEVL II: ICD-10-PCS | Mod: HCNC,,, | Performed by: STUDENT IN AN ORGANIZED HEALTH CARE EDUCATION/TRAINING PROGRAM

## 2022-02-14 PROCEDURE — 80048 BASIC METABOLIC PNL TOTAL CA: CPT | Mod: HCNC | Performed by: PHYSICIAN ASSISTANT

## 2022-02-14 PROCEDURE — 25000003 PHARM REV CODE 250: Mod: HCNC | Performed by: PHYSICIAN ASSISTANT

## 2022-02-14 PROCEDURE — 11000001 HC ACUTE MED/SURG PRIVATE ROOM: Mod: HCNC

## 2022-02-14 PROCEDURE — 85025 COMPLETE CBC W/AUTO DIFF WBC: CPT | Mod: HCNC | Performed by: PHYSICIAN ASSISTANT

## 2022-02-14 PROCEDURE — 36415 COLL VENOUS BLD VENIPUNCTURE: CPT | Mod: HCNC | Performed by: PHYSICIAN ASSISTANT

## 2022-02-14 PROCEDURE — 99232 SBSQ HOSP IP/OBS MODERATE 35: CPT | Mod: HCNC,,, | Performed by: STUDENT IN AN ORGANIZED HEALTH CARE EDUCATION/TRAINING PROGRAM

## 2022-02-14 PROCEDURE — 96372 THER/PROPH/DIAG INJ SC/IM: CPT | Mod: HCNC | Performed by: PHYSICIAN ASSISTANT

## 2022-02-14 RX ORDER — OXYCODONE AND ACETAMINOPHEN 10; 325 MG/1; MG/1
1 TABLET ORAL EVERY 8 HOURS PRN
Status: ON HOLD | COMMUNITY
End: 2022-02-22 | Stop reason: HOSPADM

## 2022-02-14 RX ORDER — CLONIDINE HYDROCHLORIDE 0.2 MG/1
0.2 TABLET ORAL 3 TIMES DAILY
Status: DISCONTINUED | OUTPATIENT
Start: 2022-02-14 | End: 2022-02-23

## 2022-02-14 RX ORDER — DICLOFENAC SODIUM 10 MG/G
GEL TOPICAL
COMMUNITY
End: 2023-08-06 | Stop reason: SINTOL

## 2022-02-14 RX ADMIN — METHOCARBAMOL 750 MG: 750 TABLET ORAL at 08:02

## 2022-02-14 RX ADMIN — CLONIDINE HYDROCHLORIDE 0.2 MG: 0.2 TABLET ORAL at 08:02

## 2022-02-14 RX ADMIN — METOPROLOL TARTRATE 25 MG: 25 TABLET, FILM COATED ORAL at 08:02

## 2022-02-14 RX ADMIN — LISINOPRIL 10 MG: 10 TABLET ORAL at 08:02

## 2022-02-14 RX ADMIN — Medication 400 MG: at 08:02

## 2022-02-14 RX ADMIN — VENLAFAXINE HYDROCHLORIDE 150 MG: 150 CAPSULE, EXTENDED RELEASE ORAL at 08:02

## 2022-02-14 RX ADMIN — INSULIN DETEMIR 13 UNITS: 100 INJECTION, SOLUTION SUBCUTANEOUS at 08:02

## 2022-02-14 RX ADMIN — HEPARIN SODIUM 5000 UNITS: 5000 INJECTION INTRAVENOUS; SUBCUTANEOUS at 03:02

## 2022-02-14 RX ADMIN — SEVELAMER CARBONATE 800 MG: 800 TABLET, FILM COATED ORAL at 08:02

## 2022-02-14 RX ADMIN — CLONIDINE HYDROCHLORIDE 0.2 MG: 0.2 TABLET ORAL at 03:02

## 2022-02-14 RX ADMIN — APIXABAN 2.5 MG: 2.5 TABLET, FILM COATED ORAL at 08:02

## 2022-02-14 RX ADMIN — INSULIN ASPART 4 UNITS: 100 INJECTION, SOLUTION INTRAVENOUS; SUBCUTANEOUS at 12:02

## 2022-02-14 RX ADMIN — INSULIN ASPART 4 UNITS: 100 INJECTION, SOLUTION INTRAVENOUS; SUBCUTANEOUS at 05:02

## 2022-02-14 RX ADMIN — HEPARIN SODIUM 5000 UNITS: 5000 INJECTION INTRAVENOUS; SUBCUTANEOUS at 05:02

## 2022-02-14 RX ADMIN — MEROPENEM AND SODIUM CHLORIDE 1 G: 1 INJECTION, SOLUTION INTRAVENOUS at 05:02

## 2022-02-14 RX ADMIN — HEPARIN SODIUM 5000 UNITS: 5000 INJECTION INTRAVENOUS; SUBCUTANEOUS at 10:02

## 2022-02-14 RX ADMIN — SEVELAMER CARBONATE 800 MG: 800 TABLET, FILM COATED ORAL at 12:02

## 2022-02-14 RX ADMIN — SEVELAMER CARBONATE 800 MG: 800 TABLET, FILM COATED ORAL at 05:02

## 2022-02-14 RX ADMIN — ANASTROZOLE 1 MG: 1 TABLET, COATED ORAL at 08:02

## 2022-02-14 RX ADMIN — HYDROCODONE BITARTRATE AND ACETAMINOPHEN 1 TABLET: 10; 325 TABLET ORAL at 08:02

## 2022-02-14 RX ADMIN — TRAZODONE HYDROCHLORIDE 100 MG: 100 TABLET ORAL at 08:02

## 2022-02-14 RX ADMIN — INSULIN ASPART 4 UNITS: 100 INJECTION, SOLUTION INTRAVENOUS; SUBCUTANEOUS at 08:02

## 2022-02-14 RX ADMIN — SUMATRIPTAN SUCCINATE 100 MG: 50 TABLET ORAL at 05:02

## 2022-02-14 RX ADMIN — ATORVASTATIN CALCIUM 80 MG: 40 TABLET, FILM COATED ORAL at 08:02

## 2022-02-14 RX ADMIN — CLONIDINE HYDROCHLORIDE 0.2 MG: 0.2 TABLET ORAL at 10:02

## 2022-02-14 RX ADMIN — OXYBUTYNIN CHLORIDE 10 MG: 10 TABLET, EXTENDED RELEASE ORAL at 08:02

## 2022-02-14 RX ADMIN — LEVOTHYROXINE SODIUM 50 MCG: 50 TABLET ORAL at 05:02

## 2022-02-14 RX ADMIN — FAMOTIDINE 20 MG: 20 TABLET ORAL at 08:02

## 2022-02-14 NOTE — PHARMACY MED REC
"Admission Medication History     The home medication history was taken by Gui Kearney.    You may go to "Admission" then "Reconcile Home Medications" tabs to review and/or act upon these items.      The home medication list has been updated by the Pharmacy department.    Please read ALL comments highlighted in yellow.    Please address this information as you see fit.     Feel free to contact us if you have any questions or require assistance.      The medications listed below were removed from the home medication list. Please reorder if appropriate:  Patient reports no longer taking the following medication(s):   MELATONIN 3 MG    Medications listed below were obtained from: Patient/family  Facility-Administered Medications Prior to Admission   Medication    darbepoetin chloe-polysorbate injection 50 mcg     PTA Medications   Medication Sig    anastrozole (ARIMIDEX) 1 mg Tab   TAKE 1 TABLET BY MOUTH EVERY DAY    apixaban (ELIQUIS) 2.5 mg Tab   Take 2.5 mg by mouth 2 (two) times daily.    atorvastatin (LIPITOR) 80 MG tablet   Take 1 tablet (80 mg total) by mouth once daily.    butalbital-acetaminophen-caffeine -40 mg (FIORICET, ESGIC) -40 mg per tablet    Take 1 tablet by mouth daily as needed (MIGRAINES). TAKE 1 TABLET BY MOUTH WHEN NOT CONTROLLED BY OTHER MEDS. NO MORE THAN 10 TABS PER 30 DAYS)    cloNIDine (CATAPRES) 0.2 MG tablet   Take 1 tablet (0.2 mg total) by mouth 3 (three) times daily.    diclofenac sodium (ARTHRITIS PAIN, DICLOFENAC,) 1 % Gel   Apply to chest wall as needed for arthritis    ergocalciferol (ERGOCALCIFEROL) 50,000 unit Cap     TAKE 1 CAPSULE (50,000 UNITS TOTAL) BY MOUTH EVERY 7 DAYS. TAKE ONE CAPSULE BY MOUTH ONE TIME PER WEEK, TAKES ON TUESDAYS    famotidine (PEPCID) 20 MG tablet   Take 1 tablet (20 mg total) by mouth once daily.    furosemide (LASIX) 80 MG tablet    Take 80 mg by mouth 2 (two) times a day.    gemfibroziL (LOPID) 600 MG tablet   Take 1 tablet (600 " "mg total) by mouth every Mon, Wed, Fri.    lisinopriL 10 MG tablet   Take 1 tablet (10 mg total) by mouth once daily.    magnesium oxide (MAG-OX) 400 mg (241.3 mg magnesium) tablet    TAKE 1 TABLET (400 MG TOTAL) BY MOUTH ONCE DAILY.    methocarbamoL (ROBAXIN) 750 MG Tab    Take 1,500 mg by mouth 3 (three) times daily as needed.    metoprolol tartrate (LOPRESSOR) 25 MG tablet   Take 1 tablet (25 mg total) by mouth 2 (two) times daily.    oxyCODONE-acetaminophen (PERCOCET)  mg per tablet   Take 1 tablet by mouth every 8 (eight) hours as needed for Pain.    scopolamine (TRANSDERM-SCOP TD)   Place 1 patch onto the skin as needed (When cruising).    SODIUM BICARBONATE ORAL Take 1 tablet by mouth once daily.      Sumatriptan (IMITREX) 100 MG tab   TAKE 1 TABLET (100 MG TOTAL) BY MOUTH 2 (TWO) TIMES DAILY AS NEEDED FOR MIGRAINE.    SYNTHROID 50 mcg tablet     Take 50 mcg by mouth before breakfast. Administer on an empty stomach at least 30 minutes before food. If receiving tube feeds, HOLD tube feeds for 1 hour before and after levothyroxine administration.      traZODone (DESYREL) 100 MG tablet   Take 1 tablet (100 mg total) by mouth nightly as needed for Insomnia.    venlafaxine (EFFEXOR-XR) 150 MG Cp24 Take 1 capsule (150 mg total) by mouth once daily.    BD INSULIN SYRINGE ULTRA-FINE 0.5 mL 31 gauge x 5/16" Syrg USE WITH INSULIN 4 TIMES A DAY    blood sugar diagnostic Strp ONE TOUCH ULTRA Check blood sugars 1-2 x a day    blood sugar diagnostic Strp     To check BG 4  times daily, to use with insurance preferred meter    blood-glucose meter kit To check BG 4 times daily, to use with insurance preferred meter    lancets Misc One touch ultra, checks 1-2 x a day    lancets Misc To check BG 4 times daily, to use with insurance preferred meter    oxybutynin (DITROPAN-XL) 10 MG 24 hr tablet   Take 10 mg by mouth once daily.    pen needle, diabetic (BD ULTRA-FINE SHORT PEN NEEDLE) 31 gauge x 5/16" " Ndle USE WITH LANTUS DAILY, e 11.65       Gui Kearney, CPhT  'EXT [id number]                  .

## 2022-02-14 NOTE — PLAN OF CARE
"[facility] - Telemetry Stepdown ([place])  Initial Discharge Assessment       Primary Care Provider: Kailey Navarro MD    Admission Diagnosis: Chest pain [R07.9]  Hypotension [I95.9]  Sepsis, due to unspecified organism, unspecified whether acute organ dysfunction present [A41.9]    Admission Date: 2/11/2022  Expected Discharge Date: 2/14/2022    Discharge Barriers Identified: None    Payor: [employer] MANAGED MEDICARE / Plan: [employer] MEDICARE HMO / Product Type: Capitation /     Extended Emergency Contact Information  Primary Emergency Contact: Princess Kat  Address: [address] [place] [address] [place] [address] [facility]  Home Phone: [phone]  Mobile Phone: [phone]  Relation: Sister    Discharge Plan A: Home Health  Discharge Plan B: Home with family      [id number] - [place] - [address]  [address]  [place] [address]  Phone: [phone] Fax: [phone]      Initial Assessment (most recent)     Adult Discharge Assessment - 02/14/22 1211        Discharge Assessment    Assessment Type Discharge Planning Assessment     Confirmed/corrected address, phone number and insurance Yes     Confirmed Demographics Correct on Facesheet     Source of Information patient     When was your last doctors appointment? --   "I saw my PCP in the last year"    Does patient/caregiver understand observation status Yes     Reason For Admission Acute cystitis with hematuria     Lives With sibling(s)     Facility Arrived From: Home     Do you expect to return to your current living situation? Yes     Do you have help at home or someone to help you manage your care at home? Yes     Who are your caregiver(s) and their phone number(s)? Sister Kat [phone]     Prior to hospitilization cognitive status: Alert/Oriented     Current cognitive status: Alert/Oriented     Walking or Climbing Stairs Difficulty ambulation difficulty, requires equipment     Mobility Management " Ray, wheelchair     Dressing/Bathing Difficulty bathing difficulty, requires equipment;bathing difficulty, assistance 1 person     Dressing/Bathing Management Shower chair, one person assist     Do you have any problems with: Errands/Grocery     Home Accessibility wheelchair accessible     Home Layout Able to live on 1st floor     Equipment Currently Used at Home bedside commode;shower chair;wheelchair;lift device     Readmission within 30 days? Yes     Patient currently being followed by outpatient case management? No     Do you currently have service(s) that help you manage your care at home? Yes     Name and Contact number of agency [facility] 3 times per week     Is the pt/caregiver preference to resume services with current agency Yes     Do you take prescription medications? Yes     Do you have prescription coverage? Yes     Coverage [employer] medicare     Do you have any problems affording any of your prescribed medications? No     Is the patient taking medications as prescribed? yes     Who is going to help you get home at discharge? Sister Kat [phone]     How do you get to doctors appointments? family or friend will provide     Are you on dialysis? Yes     Dialysis Name and Scheduled days [facility] M, W, F at 6:40am     Do you take coumadin? No     Discharge Plan A Home Health     Discharge Plan B Home with family     DME Needed Upon Discharge  none     Discharge Plan discussed with: Patient     Discharge Barriers Identified None        Relationship/Environment    Name(s) of Who Lives With Patient Sister Kat [phone]                Pt lives with her sister, Kat العراقي [phone], who is her primary caretaker, in a one story home with a ramp to enter. Pt uses a ray lift and a wheelchair but states she is working on being able to use her walker again.     Pt is current with [facility] for home health.    Pt is current with [facility] M, W and F at 6:40am.    SW will  follow.    JULIO CESAR Alejo LMSW [facility]  [id number]

## 2022-02-14 NOTE — PROGRESS NOTES
Urology Progress Note    Patient seen and examined.    Patient with left ureteral stone and indwelling left ureteral stent. Left ureteral stent initially placed 6/30, patient subsequently lost to follow up and stent exchanged 12/23/21. She is presently being treated for UTI with meropenem, repeat urine cultures pending.    - patient presently being treated with empiric merrem based on prior cultures  - would prefer definitive stone management while patient remains inpatient and on IV antibiotics given difficulty with follow up  - will tentatively plan for ureteroscopy, possible laser lithotripsy, possible stone basket extraction, possible stent replacement on Friday, 2/18/22  - will discuss plan with primary team    Please call with further questions or concerns.    Ki Hall MD  Urology, PGY-2  [facility]

## 2022-02-14 NOTE — PLAN OF CARE
Care provided to patient as per POC and as charted.  PT. Slept well all night with no acute events.   Problem: Adult Inpatient Plan of Care  Goal: Plan of Care Review  Outcome: Ongoing, Progressing  Goal: Patient-Specific Goal (Individualized)  Outcome: Ongoing, Progressing  Goal: Absence of Hospital-Acquired Illness or Injury  Outcome: Ongoing, Progressing  Goal: Optimal Comfort and Wellbeing  Outcome: Ongoing, Progressing  Goal: Readiness for Transition of Care  Outcome: Ongoing, Progressing     Problem: Bariatric Environmental Safety  Goal: Safety Maintained with Care  Outcome: Ongoing, Progressing     Problem: Device-Related Complication Risk (Hemodialysis)  Goal: Safe, Effective Therapy Delivery  Outcome: Ongoing, Progressing     Problem: Hemodynamic Instability (Hemodialysis)  Goal: Effective Tissue Perfusion  Outcome: Ongoing, Progressing     Problem: Infection (Hemodialysis)  Goal: Absence of Infection Signs and Symptoms  Outcome: Ongoing, Progressing     Problem: Diabetes Comorbidity  Goal: Blood Glucose Level Within Targeted Range  Outcome: Ongoing, Progressing     Problem: Fluid and Electrolyte Imbalance (Acute Kidney Injury/Impairment)  Goal: Fluid and Electrolyte Balance  Outcome: Ongoing, Progressing     Problem: Oral Intake Inadequate (Acute Kidney Injury/Impairment)  Goal: Optimal Nutrition Intake  Outcome: Ongoing, Progressing     Problem: Renal Function Impairment (Acute Kidney Injury/Impairment)  Goal: Effective Renal Function  Outcome: Ongoing, Progressing     Problem: Infection  Goal: Absence of Infection Signs and Symptoms  Outcome: Ongoing, Progressing     Problem: Impaired Wound Healing  Goal: Optimal Wound Healing  Outcome: Ongoing, Progressing     Problem: Skin Injury Risk Increased  Goal: Skin Health and Integrity  Outcome: Ongoing, Progressing     Problem: Electrolyte Imbalance (Chronic Kidney Disease)  Goal: Electrolyte Balance  Outcome: Ongoing, Progressing

## 2022-02-15 LAB
ALBUMIN SERPL BCP-MCNC: 2 G/DL (ref 3.5–5.2)
ANION GAP SERPL CALC-SCNC: 6 MMOL/L (ref 8–16)
BACTERIA UR CULT: ABNORMAL
BUN SERPL-MCNC: 27 MG/DL (ref 8–23)
CALCIUM SERPL-MCNC: 9.5 MG/DL (ref 8.7–10.5)
CHLORIDE SERPL-SCNC: 100 MMOL/L (ref 95–110)
CO2 SERPL-SCNC: 25 MMOL/L (ref 23–29)
CREAT SERPL-MCNC: 2 MG/DL (ref 0.5–1.4)
EST. GFR  (AFRICAN AMERICAN): 28.7 ML/MIN/1.73 M^2
EST. GFR  (NON AFRICAN AMERICAN): 24.9 ML/MIN/1.73 M^2
GLUCOSE SERPL-MCNC: 151 MG/DL (ref 70–110)
PHOSPHATE SERPL-MCNC: 4.4 MG/DL (ref 2.7–4.5)
POCT GLUCOSE: 132 MG/DL (ref 70–110)
POCT GLUCOSE: 137 MG/DL (ref 70–110)
POCT GLUCOSE: 152 MG/DL (ref 70–110)
POCT GLUCOSE: 160 MG/DL (ref 70–110)
POTASSIUM SERPL-SCNC: 4.5 MMOL/L (ref 3.5–5.1)
SARS-COV-2 RNA RESP QL NAA+PROBE: NOT DETECTED
SODIUM SERPL-SCNC: 131 MMOL/L (ref 136–145)

## 2022-02-15 PROCEDURE — 99232 PR SUBSEQUENT HOSPITAL CARE,LEVL II: ICD-10-PCS | Mod: HCNC,,, | Performed by: STUDENT IN AN ORGANIZED HEALTH CARE EDUCATION/TRAINING PROGRAM

## 2022-02-15 PROCEDURE — 80069 RENAL FUNCTION PANEL: CPT | Mod: HCNC | Performed by: STUDENT IN AN ORGANIZED HEALTH CARE EDUCATION/TRAINING PROGRAM

## 2022-02-15 PROCEDURE — 63600175 PHARM REV CODE 636 W HCPCS: Mod: HCNC | Performed by: INTERNAL MEDICINE

## 2022-02-15 PROCEDURE — 25000003 PHARM REV CODE 250: Mod: HCNC | Performed by: PHYSICIAN ASSISTANT

## 2022-02-15 PROCEDURE — 36415 COLL VENOUS BLD VENIPUNCTURE: CPT | Mod: HCNC | Performed by: STUDENT IN AN ORGANIZED HEALTH CARE EDUCATION/TRAINING PROGRAM

## 2022-02-15 PROCEDURE — 99232 SBSQ HOSP IP/OBS MODERATE 35: CPT | Mod: HCNC,,, | Performed by: STUDENT IN AN ORGANIZED HEALTH CARE EDUCATION/TRAINING PROGRAM

## 2022-02-15 PROCEDURE — C9399 UNCLASSIFIED DRUGS OR BIOLOG: HCPCS | Mod: HCNC | Performed by: PHYSICIAN ASSISTANT

## 2022-02-15 PROCEDURE — U0003 INFECTIOUS AGENT DETECTION BY NUCLEIC ACID (DNA OR RNA); SEVERE ACUTE RESPIRATORY SYNDROME CORONAVIRUS 2 (SARS-COV-2) (CORONAVIRUS DISEASE [COVID-19]), AMPLIFIED PROBE TECHNIQUE, MAKING USE OF HIGH THROUGHPUT TECHNOLOGIES AS DESCRIBED BY CMS-2020-01-R: HCPCS | Mod: HCNC | Performed by: STUDENT IN AN ORGANIZED HEALTH CARE EDUCATION/TRAINING PROGRAM

## 2022-02-15 PROCEDURE — 25000003 PHARM REV CODE 250: Mod: HCNC | Performed by: STUDENT IN AN ORGANIZED HEALTH CARE EDUCATION/TRAINING PROGRAM

## 2022-02-15 PROCEDURE — 11000001 HC ACUTE MED/SURG PRIVATE ROOM: Mod: HCNC

## 2022-02-15 PROCEDURE — 63600175 PHARM REV CODE 636 W HCPCS: Mod: HCNC | Performed by: PHYSICIAN ASSISTANT

## 2022-02-15 PROCEDURE — U0005 INFEC AGEN DETEC AMPLI PROBE: HCPCS | Performed by: STUDENT IN AN ORGANIZED HEALTH CARE EDUCATION/TRAINING PROGRAM

## 2022-02-15 RX ORDER — LISINOPRIL 20 MG/1
40 TABLET ORAL DAILY
Status: DISCONTINUED | OUTPATIENT
Start: 2022-02-16 | End: 2022-02-25 | Stop reason: HOSPADM

## 2022-02-15 RX ADMIN — CLONIDINE HYDROCHLORIDE 0.2 MG: 0.2 TABLET ORAL at 01:02

## 2022-02-15 RX ADMIN — APIXABAN 2.5 MG: 2.5 TABLET, FILM COATED ORAL at 08:02

## 2022-02-15 RX ADMIN — MEROPENEM AND SODIUM CHLORIDE 1 G: 1 INJECTION, SOLUTION INTRAVENOUS at 05:02

## 2022-02-15 RX ADMIN — ATORVASTATIN CALCIUM 80 MG: 40 TABLET, FILM COATED ORAL at 08:02

## 2022-02-15 RX ADMIN — METHOCARBAMOL 750 MG: 750 TABLET ORAL at 08:02

## 2022-02-15 RX ADMIN — HYDROCODONE BITARTRATE AND ACETAMINOPHEN 1 TABLET: 10; 325 TABLET ORAL at 07:02

## 2022-02-15 RX ADMIN — SEVELAMER CARBONATE 800 MG: 800 TABLET, FILM COATED ORAL at 05:02

## 2022-02-15 RX ADMIN — SUMATRIPTAN SUCCINATE 100 MG: 50 TABLET ORAL at 09:02

## 2022-02-15 RX ADMIN — INSULIN ASPART 4 UNITS: 100 INJECTION, SOLUTION INTRAVENOUS; SUBCUTANEOUS at 05:02

## 2022-02-15 RX ADMIN — SEVELAMER CARBONATE 800 MG: 800 TABLET, FILM COATED ORAL at 12:02

## 2022-02-15 RX ADMIN — METOPROLOL TARTRATE 25 MG: 25 TABLET, FILM COATED ORAL at 08:02

## 2022-02-15 RX ADMIN — ANASTROZOLE 1 MG: 1 TABLET, COATED ORAL at 08:02

## 2022-02-15 RX ADMIN — Medication 400 MG: at 08:02

## 2022-02-15 RX ADMIN — INSULIN ASPART 4 UNITS: 100 INJECTION, SOLUTION INTRAVENOUS; SUBCUTANEOUS at 08:02

## 2022-02-15 RX ADMIN — INSULIN DETEMIR 13 UNITS: 100 INJECTION, SOLUTION SUBCUTANEOUS at 08:02

## 2022-02-15 RX ADMIN — HYDROCODONE BITARTRATE AND ACETAMINOPHEN 1 TABLET: 10; 325 TABLET ORAL at 01:02

## 2022-02-15 RX ADMIN — HYDROCODONE BITARTRATE AND ACETAMINOPHEN 1 TABLET: 10; 325 TABLET ORAL at 05:02

## 2022-02-15 RX ADMIN — HEPARIN SODIUM 5000 UNITS: 5000 INJECTION INTRAVENOUS; SUBCUTANEOUS at 05:02

## 2022-02-15 RX ADMIN — INSULIN ASPART 4 UNITS: 100 INJECTION, SOLUTION INTRAVENOUS; SUBCUTANEOUS at 12:02

## 2022-02-15 RX ADMIN — METHOCARBAMOL 750 MG: 750 TABLET ORAL at 05:02

## 2022-02-15 RX ADMIN — SEVELAMER CARBONATE 800 MG: 800 TABLET, FILM COATED ORAL at 08:02

## 2022-02-15 RX ADMIN — OXYBUTYNIN CHLORIDE 10 MG: 10 TABLET, EXTENDED RELEASE ORAL at 08:02

## 2022-02-15 RX ADMIN — CLONIDINE HYDROCHLORIDE 0.2 MG: 0.2 TABLET ORAL at 08:02

## 2022-02-15 RX ADMIN — HEPARIN SODIUM 5000 UNITS: 5000 INJECTION INTRAVENOUS; SUBCUTANEOUS at 09:02

## 2022-02-15 RX ADMIN — LEVOTHYROXINE SODIUM 50 MCG: 50 TABLET ORAL at 05:02

## 2022-02-15 RX ADMIN — TRAZODONE HYDROCHLORIDE 100 MG: 100 TABLET ORAL at 08:02

## 2022-02-15 RX ADMIN — LISINOPRIL 10 MG: 10 TABLET ORAL at 08:02

## 2022-02-15 RX ADMIN — VENLAFAXINE HYDROCHLORIDE 150 MG: 150 CAPSULE, EXTENDED RELEASE ORAL at 08:02

## 2022-02-15 RX ADMIN — FAMOTIDINE 20 MG: 20 TABLET ORAL at 08:02

## 2022-02-15 RX ADMIN — HEPARIN SODIUM 5000 UNITS: 5000 INJECTION INTRAVENOUS; SUBCUTANEOUS at 01:02

## 2022-02-15 NOTE — PROGRESS NOTES
[facility] - Telemetry [facility] ([address] Medicine  Progress Note    Patient Name: Cecile Bowen  MRN: [id number]  Patient Class: IP- Inpatient   Admission Date: 2/11/2022  Length of Stay: 1 days  Attending Physician: Autumn Hoover MD  Primary Care Provider: Kailey Navarro MD        Subjective:     Principal Problem:Acute cystitis with hematuria        HPI:  Cecile Bowen is a 71 y.o. female with PMHx significant for HTN, HLD, hx of ESBL UTI, ESRD on HD admitted to observation for hypotension, found to have a UTI. Patient was at her dialysis clinic today when she was found to have a BP of 76/43 and advised to come to the ED for evaluation. Reports her SBPs usually run in the 120s. Endorses cloudy urine in her catheter bag for the past few days, and reports it was last changed about a week ago. Denies lightheadedness, dizziness, SOB, fatigue, weakness, HA, CP, cough, abdominal pain, n/v. Patient was recently hospitalized for a ESBL UTI for which she completed her course of meropenem. Of note, patient also has a right subclavian dialysis access that was also changed about a week ago.     In the ED, BP 94/24 improved to 102/57. Remaining VSS. WBC 8.06. Lactic 1.8. Procal 0.12. Hgb 8.5 (~BL). . Cr 2.3 (~BL). Glucose 224. UA with 1+ leuks, 28 RBCs, >100 WBCs. CXR with no acute processes. Given Vanc 2g x 1.       Overview/Hospital Course:  Admitted to  for possible UTI. Cultures obtained, suprapubic catheter changes apparently in the ED, and started Abx. Nephrology consulted for HD. Pt continued on broad antibiotics w/improvement in signs and symptoms. Final culture results w/klebsiella pneumonia- susceptible to connie and bactrim. Urology evaluated patient, 02/14. Pt w/h/o Lt ureteral stone- s/p stent placement 06/2021 and replacement 12/2021- intermittently lost to follow-up. Planning for ureteroscopy 02/18 w/definitive stone management and stent removal as patient has high likelihood  to continue to be lost to f/u. Agreed that it was in the best interest of the patient to remain inpatient to undergo further interventions/evaluations via urology.       Interval History: Patient was seen and evaluated this am. Still w/elevated BP in past 24hrs. No chest pain, sob, cough, abd pain, nausea, vomiting, fevers, chills, night sweats, constipation. Urine w/in suprapubic bag draining yellow, clear urine.     Pt agreeable to staying inpatient while awaiting ureteroscopy 02/18.    Objective:     Vital Signs (Most Recent):  Temp: 98.7 °F (37.1 °C) (02/15/22 1625)  Pulse: 61 (02/15/22 1625)  Resp: 18 (02/15/22 1625)  BP: (!) 168/70 (02/15/22 1625)  SpO2: 96 % (02/15/22 1625) Vital Signs (24h Range):  Temp:  [98 °F (36.7 °C)-98.9 °F (37.2 °C)] 98.7 °F (37.1 °C)  Pulse:  [60-76] 61  Resp:  [16-19] 18  SpO2:  [95 %-98 %] 96 %  BP: (139-182)/(66-74) 168/70     Weight: 105.2 kg (231 lb 14.8 oz)  Body mass index is 41.08 kg/m².    Intake/Output Summary (Last 24 hours) at 2/15/2022 1726  Last data filed at 2/15/2022 0400  Gross per 24 hour   Intake --   Output 1250 ml   Net -1250 ml      Physical Exam  Gen: in NAD, appears stated age  Neuro: AAOx3, motor, sensory, and strength grossly intact BL  HEENT: NTNC, EOMI, PERRL, MMM  CVS: RRR, no m/r/g; S1/S2 auscultated with no S3 or S4; capillary refill < 2 sec  Chest: TDC of right chest wall  Resp: lungs CTAB, no w/r/r; no belabored breathing or accessory muscle use appreciated   Abd: BS+ in all 4 quadrants; NTND, soft to palpation; no organomegaly appreciated   : suprapubic catheter in place w/clear yellow output, some purulence seen at suprapubic site- no surrounding erythema surrounding site  Extrem: pulses full, equal, and regular over all 4 extremities; no UE or LE edema BL    Significant Labs:   All pertinent labs within the past 24 hours have been reviewed.  Blood Culture: No results for input(s): LABBLOO in the last 48 hours.  CBC:   Recent Labs   Lab  02/14/22  0355   WBC 5.76   HGB 9.2*   HCT 30.4*        CMP:   Recent Labs   Lab 02/14/22  0355 02/15/22  0818   * 131*   K 4.0 4.5   CL 99 100   CO2 22* 25    151*   BUN 21 27*   CREATININE 1.7* 2.0*   CALCIUM 8.8 9.5   ALBUMIN  --  2.0*   ANIONGAP 10 6*   EGFRNONAA 30.3* 24.9*     Urine Culture: No results for input(s): LABURIN in the last 48 hours.  Urine Studies: No results for input(s): COLORU, APPEARANCEUA, PHUR, SPECGRAV, PROTEINUA, GLUCUA, KETONESU, BILIRUBINUA, OCCULTUA, NITRITE, UROBILINOGEN, LEUKOCYTESUR, RBCUA, WBCUA, BACTERIA, SQUAMEPITHEL, HYALINECASTS in the last 48 hours.    Invalid input(s): WRIGHTSUR    Significant Imaging: I have reviewed all pertinent imaging results/findings within the past 24 hours.     CXR 02/11:  Impression:     No significant intrathoracic abnormality or detrimental interval change in the appearance of the chest since 01/21/2022 at 13:20 appreciated, noting that the current examination demonstrates a poorer inspiratory depth level than that prior study.      Assessment/Plan:      * Acute cystitis with hematuria  - SIRS: 0/4; hypotension  - Hx of ESBL UTI; completed course of meropenem  - UA with 1+ leuks, 28 RBCs, >100 WBCs  - UCx w/GNR >100,000- susceptibilities to conine and bactrim- will continue antibiotics leading up to ureteroscopy and dc post-op  - Given Vanc in ED   - suprapubic catheter changed in the ED    CKD (chronic kidney disease) requiring chronic dialysis  - HD MWF  - Nephrology following  - continue sevelamer carbonate 800mg TIDW    Pressure injury of right buttock, stage 3  - present on admission  - no infection on examination  - wound care consulted, appreciate recs  - turn patient q2h    Urinary tract infection due to ESBL Klebsiella  - hx of this, awaiting urine cultures     Secondary hypertension  - With elevated BP- continue lopressor, lisinopril and clonidine  - Holding lasix   - increase lisinopril to 40mg qday   - Titrate BP meds  accordingly    A-fib  - continue Eliquis 2.5mg BID  - continue lopressor 25mg BID today   - tele    Left ureteral stone  - h/o left ureteral stone and indwelling left ureteral stent  - stent placed 06/30/21- pt lost to fu- stent exchanged 12/23/21  - urology following: tentatively plan for ureteroscopy w/laser lithotripsy vs. Stone basket extraction vs. Stent replacement 02/18/22    Malignant neoplasm of left breast, estrogen receptor positive  - continue home anastrozole 1mg daily    Chronic pain  - follows w/pain specialist  - Improved back pain   - continue norco 10mg q6h for mod pain    Suprapubic catheter  - exchanged in the ED    Uncontrolled type 2 diabetes mellitus with stage 4 chronic kidney disease, with long-term current use of insulin  - uncontrolled on admit  - continue Levemir 13 units daily, Novolog 4 units TIDW, SSI  - up titrate as needed  - POCT glucose ACHS  - hypoglycemic protocol    Hemoglobin A1C   Date Value Ref Range Status   01/03/2022 5.4 4.8 - 5.9 % Final   12/21/2021 5.3 4.0 - 5.6 % Final     Comment:     ADA Screening Guidelines:  5.7-6.4%  Consistent with prediabetes  >or=6.5%  Consistent with diabetes    High levels of fetal hemoglobin interfere with the HbA1C  assay. Heterozygous hemoglobin variants (HbS, HgC, etc)do  not significantly interfere with this assay.   However, presence of multiple variants may affect accuracy.     09/05/2021 6.6 (H) 4.0 - 5.6 % Final     Comment:     ADA Screening Guidelines:  5.7-6.4%  Consistent with prediabetes  >or=6.5%  Consistent with diabetes    High levels of fetal hemoglobin interfere with the HbA1C  assay. Heterozygous hemoglobin variants (HbS, HgC, etc)do  not significantly interfere with this assay.   However, presence of multiple variants may affect accuracy.           Mixed migraine and muscle contraction headache  - stable  - home Fioricet and sumatriptan prn    Neurogenic bladder  - noted, has suprapubic catheter     Urinary retention  -  continue home oxybutynin 10mg daily    Recurrent urinary tract infection  - see above    Hyperlipidemia associated with type 2 diabetes mellitus  - continue Lipitor 80mg daily    Hypothyroidism  - continue home synthroid 50mcg    VTE Risk Mitigation (From admission, onward)         Ordered     heparin (porcine) injection 1,000 Units  As needed (PRN)         02/12/22 1104     apixaban tablet 2.5 mg  2 times daily         02/11/22 1342     heparin (porcine) injection 5,000 Units  Every 8 hours         02/11/22 1132     IP VTE HIGH RISK PATIENT  Once         02/11/22 1132     Place sequential compression device  Until discontinued         02/11/22 1132                Discharge Planning   FLORENTINO: 2/20/2022     Code Status: Full Code   Is the patient medically ready for discharge?: No    Reason for patient still in hospital (select all that apply): Patient trending condition  Discharge Plan A: Home Health                Autumn Hoover MD  Department of Hospital Medicine   [facility] - Telemetry Stepdown ([place]

## 2022-02-15 NOTE — SUBJECTIVE & OBJECTIVE
Interval History: Patient was seen and evaluated this am. Still w/elevated BP in past 24hrs. No chest pain, sob, cough, abd pain, nausea, vomiting, fevers, chills, night sweats, constipation. Urine w/in suprapubic bag draining yellow, clear urine.     Pt agreeable to staying inpatient while awaiting ureteroscopy 02/18.    Objective:     Vital Signs (Most Recent):  Temp: 98.7 °F (37.1 °C) (02/15/22 1625)  Pulse: 61 (02/15/22 1625)  Resp: 18 (02/15/22 1625)  BP: (!) 168/70 (02/15/22 1625)  SpO2: 96 % (02/15/22 1625) Vital Signs (24h Range):  Temp:  [98 °F (36.7 °C)-98.9 °F (37.2 °C)] 98.7 °F (37.1 °C)  Pulse:  [60-76] 61  Resp:  [16-19] 18  SpO2:  [95 %-98 %] 96 %  BP: (139-182)/(66-74) 168/70     Weight: 105.2 kg (231 lb 14.8 oz)  Body mass index is 41.08 kg/m².    Intake/Output Summary (Last 24 hours) at 2/15/2022 1726  Last data filed at 2/15/2022 0400  Gross per 24 hour   Intake --   Output 1250 ml   Net -1250 ml      Physical Exam  Gen: in NAD, appears stated age  Neuro: AAOx3, motor, sensory, and strength grossly intact BL  HEENT: NTNC, EOMI, PERRL, MMM  CVS: RRR, no m/r/g; S1/S2 auscultated with no S3 or S4; capillary refill < 2 sec  Chest: TDC of right chest wall  Resp: lungs CTAB, no w/r/r; no belabored breathing or accessory muscle use appreciated   Abd: BS+ in all 4 quadrants; NTND, soft to palpation; no organomegaly appreciated   : suprapubic catheter in place w/clear yellow output, some purulence seen at suprapubic site- no surrounding erythema surrounding site  Extrem: pulses full, equal, and regular over all 4 extremities; no UE or LE edema BL    Significant Labs:   All pertinent labs within the past 24 hours have been reviewed.  Blood Culture: No results for input(s): LABBLOO in the last 48 hours.  CBC:   Recent Labs   Lab 02/14/22 0355   WBC 5.76   HGB 9.2*   HCT 30.4*        CMP:   Recent Labs   Lab 02/14/22  0355 02/15/22  0818   * 131*   K 4.0 4.5   CL 99 100   CO2 22* 25     151*   BUN 21 27*   CREATININE 1.7* 2.0*   CALCIUM 8.8 9.5   ALBUMIN  --  2.0*   ANIONGAP 10 6*   EGFRNONAA 30.3* 24.9*     Urine Culture: No results for input(s): LABURIN in the last 48 hours.  Urine Studies: No results for input(s): COLORU, APPEARANCEUA, PHUR, SPECGRAV, PROTEINUA, GLUCUA, KETONESU, BILIRUBINUA, OCCULTUA, NITRITE, UROBILINOGEN, LEUKOCYTESUR, RBCUA, WBCUA, BACTERIA, SQUAMEPITHEL, HYALINECASTS in the last 48 hours.    Invalid input(s): WRIGHTSUR    Significant Imaging: I have reviewed all pertinent imaging results/findings within the past 24 hours.     CXR 02/11:  Impression:     No significant intrathoracic abnormality or detrimental interval change in the appearance of the chest since 01/21/2022 at 13:20 appreciated, noting that the current examination demonstrates a poorer inspiratory depth level than that prior study.

## 2022-02-15 NOTE — PLAN OF CARE
Care provided to patient as per POC and as charted.  No acute events overnight.  Pt. Slept with some intermittent disruption.   Problem: Adult Inpatient Plan of Care  Goal: Plan of Care Review  Outcome: Ongoing, Progressing  Goal: Patient-Specific Goal (Individualized)  Outcome: Ongoing, Progressing  Goal: Absence of Hospital-Acquired Illness or Injury  Outcome: Ongoing, Progressing  Goal: Optimal Comfort and Wellbeing  Outcome: Ongoing, Progressing  Goal: Readiness for Transition of Care  Outcome: Ongoing, Progressing     Problem: Bariatric Environmental Safety  Goal: Safety Maintained with Care  Outcome: Ongoing, Progressing     Problem: Device-Related Complication Risk (Hemodialysis)  Goal: Safe, Effective Therapy Delivery  Outcome: Ongoing, Progressing     Problem: Hemodynamic Instability (Hemodialysis)  Goal: Effective Tissue Perfusion  Outcome: Ongoing, Progressing     Problem: Infection (Hemodialysis)  Goal: Absence of Infection Signs and Symptoms  Outcome: Ongoing, Progressing     Problem: Infection (Hemodialysis)  Goal: Absence of Infection Signs and Symptoms  Outcome: Ongoing, Progressing     Problem: Diabetes Comorbidity  Goal: Blood Glucose Level Within Targeted Range  Outcome: Ongoing, Progressing     Problem: Fluid and Electrolyte Imbalance (Acute Kidney Injury/Impairment)  Goal: Fluid and Electrolyte Balance  Outcome: Ongoing, Progressing     Problem: Oral Intake Inadequate (Acute Kidney Injury/Impairment)  Goal: Optimal Nutrition Intake  Outcome: Ongoing, Progressing     Problem: Renal Function Impairment (Acute Kidney Injury/Impairment)  Goal: Effective Renal Function  Outcome: Ongoing, Progressing     Problem: Infection  Goal: Absence of Infection Signs and Symptoms  Outcome: Ongoing, Progressing     Problem: Impaired Wound Healing  Goal: Optimal Wound Healing  Outcome: Ongoing, Progressing     Problem: Skin Injury Risk Increased  Goal: Skin Health and Integrity  Outcome: Ongoing, Progressing      Problem: Electrolyte Imbalance (Chronic Kidney Disease)  Goal: Electrolyte Balance  Outcome: Ongoing, Progressing

## 2022-02-15 NOTE — ASSESSMENT & PLAN NOTE
- With elevated BP- continue lopressor, lisinopril and clonidine  - Holding lasix   - Titrate BP meds accordingly

## 2022-02-15 NOTE — ASSESSMENT & PLAN NOTE
- uncontrolled on admit  - continue Levemir 13 units daily, Novolog 4 units TIDW, SSI  - up titrate as needed  - POCT glucose ACHS  - hypoglycemic protocol    Hemoglobin A1C   Date Value Ref Range Status   01/03/2022 5.4 4.8 - 5.9 % Final   12/21/2021 5.3 4.0 - 5.6 % Final     Comment:     ADA Screening Guidelines:  5.7-6.4%  Consistent with prediabetes  >or=6.5%  Consistent with diabetes    High levels of fetal hemoglobin interfere with the HbA1C  assay. Heterozygous hemoglobin variants (HbS, HgC, etc)do  not significantly interfere with this assay.   However, presence of multiple variants may affect accuracy.     09/05/2021 6.6 (H) 4.0 - 5.6 % Final     Comment:     ADA Screening Guidelines:  5.7-6.4%  Consistent with prediabetes  >or=6.5%  Consistent with diabetes    High levels of fetal hemoglobin interfere with the HbA1C  assay. Heterozygous hemoglobin variants (HbS, HgC, etc)do  not significantly interfere with this assay.   However, presence of multiple variants may affect accuracy.

## 2022-02-15 NOTE — ASSESSMENT & PLAN NOTE
- h/o left ureteral stone and indwelling left ureteral stent  - stent placed 06/30/21- pt lost to fu- stent exchanged 12/23/21  - urology following: tentatively plan for ureteroscopy w/laser lithotripsy vs. Stone basket extraction vs. Stent replacement 02/18/22

## 2022-02-15 NOTE — CONSULTS
[facility] - Telemetry Stepdown ([place] [address])  Wound Care    Patient Name:  Cecile Bowen   MRN:  [id number]  Date: 2/15/2022  Diagnosis: Acute cystitis with hematuria    History:     Past Medical History:   Diagnosis Date    Cervicogenic migraine     Chronic pain     CKD (chronic kidney disease) stage 4, GFR 15-29 ml/min     [facility]    CKD (chronic kidney disease) stage 4, GFR 15-29 ml/min     Diabetes mellitus     Long term use of Insulin, Diabetic Neuropathy    Fibromyalgia     Hydronephrosis     Hyperlipidemia     Hypertension 12/12/2012    Hypothyroidism 12/12/2012    ARON (iron deficiency anemia)     Insomnia     Levoscoliosis     Malignant neoplasm of upper-outer quadrant of left breast in female, estrogen receptor positive     Metabolic acidosis     Mobility impaired     Nuclear sclerosis - Both Eyes 3/24/2014    VIJAYA (obstructive sleep apnea)     Osteopenia     Pulmonary nodule     Recurrent UTI     Renal manifestation of secondary diabetes mellitus     Secondary hyperparathyroidism, renal     Urinary retention        Social History     Socioeconomic History    Marital status:     Number of children: 0    Highest education level: Master's degree (e.g., MA, MS, Lelo, MEd, MSW, BANDAR)   Occupational History    Occupation: teacher     Comment: retired   Tobacco Use    Smoking status: Never Smoker    Smokeless tobacco: Never Used   Substance and Sexual Activity    Alcohol use: No     Alcohol/week: 0.0 standard drinks    Drug use: No    Sexual activity: Not Currently     Birth control/protection: Abstinence   Social History Narrative    Single ()        Lives w/ sister and brother. Pt needs to keep her narcotics hidden from her brother who will steal them         Brother passed away last year.  Pt lives her her sister. (5/27)     Social Determinants of Health     Financial Resource Strain: Medium Risk    Difficulty of Paying Living Expenses: Somewhat hard   Food  Insecurity: Food Insecurity Present    Worried About Running Out of Food in the Last Year: Never true    Ran Out of Food in the Last Year: Sometimes true   Transportation Needs: No Transportation Needs    Lack of Transportation (Medical): No    Lack of Transportation (Non-Medical): No   Physical Activity: Inactive    Days of Exercise per Week: 0 days    Minutes of Exercise per Session: 0 min   Stress: No Stress Concern Present    Feeling of Stress : Only a little   Social Connections: Moderately Isolated    Frequency of Communication with Friends and Family: Three times a week    Frequency of Social Gatherings with Friends and Family: Never    Attends Catholic Services: Never    Active Member of Clubs or Organizations: Yes    Attends Club or Organization Meetings: Never    Marital Status:    Housing Stability: Low Risk     Unable to Pay for Housing in the Last Year: No    Number of Places Lived in the Last Year: 1    Unstable Housing in the Last Year: No       Precautions:     Allergies as of 02/11/2022    (No Known Allergies)       [facility] Assessment Details/Treatment     Wound care consult received from RN for assessment of gluteal cleft and sacrum. Patient known to wound care from previous admissions. Assessment and picture listed below.       Right buttock- present on admit- 1.5 x 6 x 0.2 cm- noted healing previously documented stage 3 pressure injury- full thickness - no odor- small amount of sanguinous drainage- being complicated by moisture/incontience    Left buttock- present on admit- 1.5 x 1.5 x 0.2 cm- healing shallow full thickness- no odor- scant serous drainage- etiology likely pressure being complicated by moisture/incontience          Recommendations made to primary team for triad ointment BID to buttocks to promote healing and manage moisture. Nursing and MD team notified with recommendations. Orders placed. Nursing to maintain pressure injury prevention interventions to  include waffle overlay. Wound care to assist with pt prn.     02/15/2022

## 2022-02-15 NOTE — ASSESSMENT & PLAN NOTE
- SIRS: 0/4; hypotension  - Hx of ESBL UTI; completed course of meropenem  - UA with 1+ leuks, 28 RBCs, >100 WBCs  - UCx w/GNR >100,000- susceptibilities pending   - Given Vanc in ED   - suprapubic catheter changed in the ED  - Continue meropenem based on last UCx

## 2022-02-15 NOTE — PROGRESS NOTES
Petros Shaffer - Telemetry [facility] ([address] Medicine  Progress Note    Patient Name: Cecile Bowen  MRN: [id number]  Patient Class: IP- Inpatient   Admission Date: 2/11/2022  Length of Stay: 0 days  Attending Physician: Autumn Hoover MD  Primary Care Provider: Kailey Navarro MD        Subjective:     Principal Problem:Acute cystitis with hematuria        HPI:  Cecile Bowen is a 71 y.o. female with PMHx significant for HTN, HLD, hx of ESBL UTI, ESRD on HD admitted to observation for hypotension, found to have a UTI. Patient was at her dialysis clinic today when she was found to have a BP of 76/43 and advised to come to the ED for evaluation. Reports her SBPs usually run in the 120s. Endorses cloudy urine in her catheter bag for the past few days, and reports it was last changed about a week ago. Denies lightheadedness, dizziness, SOB, fatigue, weakness, HA, CP, cough, abdominal pain, n/v. Patient was recently hospitalized for a ESBL UTI for which she completed her course of meropenem. Of note, patient also has a right subclavian dialysis access that was also changed about a week ago.     In the ED, BP 94/24 improved to 102/57. Remaining VSS. WBC 8.06. Lactic 1.8. Procal 0.12. Hgb 8.5 (~BL). . Cr 2.3 (~BL). Glucose 224. UA with 1+ leuks, 28 RBCs, >100 WBCs. CXR with no acute processes. Given Vanc 2g x 1.       Overview/Hospital Course:  Admitted to  for possible UTI. Cultures obtained, suprapubic catheter changes apparently in the ED, and started Abx. Nephrology consulted for HD. Pt continued on broad antibiotics w/improvement in signs and symptoms. Awaiting final Ucx results.       Interval History: Patient was seen and evaluated this am. Still w/elevated BP in past 24hrs. No chest pain, sob, cough, abd pain, nausea, vomiting, fevers, chills, night sweats, constipation. Urine w/in suprapubic bag draining yellow, clear urine.     Objective:     Vital Signs (Most Recent):  Temp:  98.9 °F (37.2 °C) (02/14/22 1951)  Pulse: 69 (02/14/22 1951)  Resp: (P) 17 (02/14/22 2033)  BP: (!) 167/70 (02/14/22 1951)  SpO2: 97 % (02/14/22 1951) Vital Signs (24h Range):  Temp:  [97.3 °F (36.3 °C)-98.9 °F (37.2 °C)] 98.9 °F (37.2 °C)  Pulse:  [65-79] 69  Resp:  [16-18] (P) 17  SpO2:  [93 %-98 %] 97 %  BP: (131-182)/(66-81) 167/70     Weight: 105.2 kg (231 lb 14.8 oz)  Body mass index is 41.08 kg/m².    Intake/Output Summary (Last 24 hours) at 2/14/2022 2229  Last data filed at 2/14/2022 1600  Gross per 24 hour   Intake 240 ml   Output 1650 ml   Net -1410 ml      Physical Exam  Gen: in NAD, appears stated age  Neuro: AAOx3, motor, sensory, and strength grossly intact BL  HEENT: NTNC, EOMI, PERRL, MMM  CVS: RRR, no m/r/g; S1/S2 auscultated with no S3 or S4; capillary refill < 2 sec  Chest: TDC of right chest wall  Resp: lungs CTAB, no w/r/r; no belabored breathing or accessory muscle use appreciated   Abd: BS+ in all 4 quadrants; NTND, soft to palpation; no organomegaly appreciated   : suprapubic catheter in place w/clear yellow output, some purulence seen at suprapubic site- no surrounding erythema surrounding site  Extrem: pulses full, equal, and regular over all 4 extremities; no UE or LE edema BL    Significant Labs:   All pertinent labs within the past 24 hours have been reviewed.  Blood Culture: No results for input(s): LABBLOO in the last 48 hours.  CBC:   Recent Labs   Lab 02/13/22  0514 02/14/22  0355   WBC 5.58 5.76   HGB 9.2* 9.2*   HCT 29.7* 30.4*    332     CMP:   Recent Labs   Lab 02/14/22  0355   *   K 4.0   CL 99   CO2 22*      BUN 21   CREATININE 1.7*   CALCIUM 8.8   ANIONGAP 10   EGFRNONAA 30.3*     Urine Culture: No results for input(s): LABURIN in the last 48 hours.  Urine Studies: No results for input(s): COLORU, APPEARANCEUA, PHUR, SPECGRAV, PROTEINUA, GLUCUA, KETONESU, BILIRUBINUA, OCCULTUA, NITRITE, UROBILINOGEN, LEUKOCYTESUR, RBCUA, WBCUA, BACTERIA, SQUAMEPITHEL,  HYALINECASTS in the last 48 hours.    Invalid input(s): ANTONIO    Significant Imaging: I have reviewed all pertinent imaging results/findings within the past 24 hours.     CXR 02/11:  Impression:     No significant intrathoracic abnormality or detrimental interval change in the appearance of the chest since 01/21/2022 at 13:20 appreciated, noting that the current examination demonstrates a poorer inspiratory depth level than that prior study.      Assessment/Plan:      * Acute cystitis with hematuria  - SIRS: 0/4; hypotension  - Hx of ESBL UTI; completed course of meropenem  - UA with 1+ leuks, 28 RBCs, >100 WBCs  - UCx w/GNR >100,000- susceptibilities pending   - Given Vanc in ED   - suprapubic catheter changed in the ED  - Continue meropenem based on last UCx    Left ureteral stone  - h/o left ureteral stone and indwelling left ureteral sten  - stent placed 06/30/21- pt lost to fu- stent exchanged 12/23/21  - urology following: tentatively plan for ureteroscopy w/laser lithotripsy vs. Stone basket extraction vs. Stent replacement 02/18/22    CKD (chronic kidney disease) requiring chronic dialysis  - HD MWF  - Nephrology following  - continue sevelamer carbonate 800mg TIDW    Pressure injury of right buttock, stage 3  - present on admission  - do not appear infection on exam  - wound care consulted, appreciate recs  - turn patient q2h    Urinary tract infection due to ESBL Klebsiella  - hx of this, awaiting urine cultures     Secondary hypertension  - With elevated BP- continue lopressor, lisinopril and clonidine  - Holding lasix   - Titrate BP meds accordingly    A-fib  - continue Eliquis 2.5mg BID  - continue lopressor 25mg BID today   - tele    Malignant neoplasm of left breast, estrogen receptor positive  - continue home anastrozole 1mg daily    Chronic pain  - follows w/pain specialist  - Improved back pain   - continue norco 10mg q6h for mod pain    Suprapubic catheter  - exchanged in the  ED    Uncontrolled type 2 diabetes mellitus with stage 4 chronic kidney disease, with long-term current use of insulin  - uncontrolled on admit  - continue Levemir 13 units daily, Novolog 4 units TIDW, SSI  - up titrate as needed  - POCT glucose ACHS  - hypoglycemic protocol    Hemoglobin A1C   Date Value Ref Range Status   01/03/2022 5.4 4.8 - 5.9 % Final   12/21/2021 5.3 4.0 - 5.6 % Final     Comment:     ADA Screening Guidelines:  5.7-6.4%  Consistent with prediabetes  >or=6.5%  Consistent with diabetes    High levels of fetal hemoglobin interfere with the HbA1C  assay. Heterozygous hemoglobin variants (HbS, HgC, etc)do  not significantly interfere with this assay.   However, presence of multiple variants may affect accuracy.     09/05/2021 6.6 (H) 4.0 - 5.6 % Final     Comment:     ADA Screening Guidelines:  5.7-6.4%  Consistent with prediabetes  >or=6.5%  Consistent with diabetes    High levels of fetal hemoglobin interfere with the HbA1C  assay. Heterozygous hemoglobin variants (HbS, HgC, etc)do  not significantly interfere with this assay.   However, presence of multiple variants may affect accuracy.         Mixed migraine and muscle contraction headache  - stable  - home Fioricet and sumatriptan prn    Neurogenic bladder  - noted, has suprapubic catheter     Urinary retention  - continue home oxybutynin 10mg daily    Recurrent urinary tract infection  - see above    Hyperlipidemia associated with type 2 diabetes mellitus  - continue Lipitor 80mg daily    Hypothyroidism  - continue home synthroid 50mcg    VTE Risk Mitigation (From admission, onward)         Ordered     heparin (porcine) injection 1,000 Units  As needed (PRN)         02/12/22 1104     apixaban tablet 2.5 mg  2 times daily         02/11/22 1342     heparin (porcine) injection 5,000 Units  Every 8 hours         02/11/22 1132     IP VTE HIGH RISK PATIENT  Once         02/11/22 1132     Place sequential compression device  Until discontinued          02/11/22 1132                Discharge Planning   FLORENTINO: 2/14/2022     Code Status: Full Code   Is the patient medically ready for discharge?: No    Reason for patient still in hospital (select all that apply): Patient trending condition  Discharge Plan A: Home Health              Autumn Hoover MD  Department of Hospital Medicine   [facility] - Telemetry Stepdown ([place]-[facility])

## 2022-02-15 NOTE — SUBJECTIVE & OBJECTIVE
Interval History: Patient was seen and evaluated this am. Still w/elevated BP in past 24hrs. No chest pain, sob, cough, abd pain, nausea, vomiting, fevers, chills, night sweats, constipation. Urine w/in suprapubic bag draining yellow, clear urine.     Objective:     Vital Signs (Most Recent):  Temp: 98.9 °F (37.2 °C) (02/14/22 1951)  Pulse: 69 (02/14/22 1951)  Resp: (P) 17 (02/14/22 2033)  BP: (!) 167/70 (02/14/22 1951)  SpO2: 97 % (02/14/22 1951) Vital Signs (24h Range):  Temp:  [97.3 °F (36.3 °C)-98.9 °F (37.2 °C)] 98.9 °F (37.2 °C)  Pulse:  [65-79] 69  Resp:  [16-18] (P) 17  SpO2:  [93 %-98 %] 97 %  BP: (131-182)/(66-81) 167/70     Weight: 105.2 kg (231 lb 14.8 oz)  Body mass index is 41.08 kg/m².    Intake/Output Summary (Last 24 hours) at 2/14/2022 2229  Last data filed at 2/14/2022 1600  Gross per 24 hour   Intake 240 ml   Output 1650 ml   Net -1410 ml      Physical Exam  Gen: in NAD, appears stated age  Neuro: AAOx3, motor, sensory, and strength grossly intact BL  HEENT: NTNC, EOMI, PERRL, MMM  CVS: RRR, no m/r/g; S1/S2 auscultated with no S3 or S4; capillary refill < 2 sec  Chest: TDC of right chest wall  Resp: lungs CTAB, no w/r/r; no belabored breathing or accessory muscle use appreciated   Abd: BS+ in all 4 quadrants; NTND, soft to palpation; no organomegaly appreciated   : suprapubic catheter in place w/clear yellow output, some purulence seen at suprapubic site- no surrounding erythema surrounding site  Extrem: pulses full, equal, and regular over all 4 extremities; no UE or LE edema BL    Significant Labs:   All pertinent labs within the past 24 hours have been reviewed.  Blood Culture: No results for input(s): LABBLOO in the last 48 hours.  CBC:   Recent Labs   Lab 02/13/22  0514 02/14/22  0355   WBC 5.58 5.76   HGB 9.2* 9.2*   HCT 29.7* 30.4*    332     CMP:   Recent Labs   Lab 02/14/22  0355   *   K 4.0   CL 99   CO2 22*      BUN 21   CREATININE 1.7*   CALCIUM 8.8   ANIONGAP 10    EGFRNONAA 30.3*     Urine Culture: No results for input(s): LABURIN in the last 48 hours.  Urine Studies: No results for input(s): COLORU, APPEARANCEUA, PHUR, SPECGRAV, PROTEINUA, GLUCUA, KETONESU, BILIRUBINUA, OCCULTUA, NITRITE, UROBILINOGEN, LEUKOCYTESUR, RBCUA, WBCUA, BACTERIA, SQUAMEPITHEL, HYALINECASTS in the last 48 hours.    Invalid input(s): WRIGHTSUR    Significant Imaging: I have reviewed all pertinent imaging results/findings within the past 24 hours.     CXR 02/11:  Impression:     No significant intrathoracic abnormality or detrimental interval change in the appearance of the chest since 01/21/2022 at 13:20 appreciated, noting that the current examination demonstrates a poorer inspiratory depth level than that prior study.

## 2022-02-15 NOTE — ASSESSMENT & PLAN NOTE
- present on admission  - no infection on examination  - wound care consulted, appreciate recs  - turn patient q2h

## 2022-02-15 NOTE — ASSESSMENT & PLAN NOTE
- SIRS: 0/4; hypotension  - Hx of ESBL UTI; completed course of meropenem  - UA with 1+ leuks, 28 RBCs, >100 WBCs  - UCx w/GNR >100,000- susceptibilities to connie and bactrim- will continue antibiotics leading up to ureteroscopy and dc post-op  - Given Vanc in ED   - suprapubic catheter changed in the ED

## 2022-02-15 NOTE — ASSESSMENT & PLAN NOTE
- h/o left ureteral stone and indwelling left ureteral sten  - stent placed 06/30/21- pt lost to fu- stent exchanged 12/23/21  - urology following: tentatively plan for ureteroscopy w/laser lithotripsy vs. Stone basket extraction vs. Stent replacement 02/18/22

## 2022-02-15 NOTE — ASSESSMENT & PLAN NOTE
- With elevated BP- continue lopressor, lisinopril and clonidine  - Holding lasix   - increase lisinopril to 40mg qday   - Titrate BP meds accordingly

## 2022-02-15 NOTE — CARE UPDATE
Nephrology   Care update     S: Cceile Bowen is a 71 y.o. female  w/ PMHx ESRD 2/2 diabetes and recurrent UTIs on HD MWF Admitted on 2/11/2022 for hypotension found to have UTI.   Urology plan: will tentatively plan for ureteroscopy, possible laser lithotripsy, possible stone basket extraction, possible stent replacement on Friday, 2/18/22  Last iHD 2/12 net 2.5L (tunneled cath)   Daily assessment for HD needs done, pt appears to have signs of renal function recovery     O:     Intake/Output Summary (Last 24 hours) at 2/15/2022 1134  Last data filed at 2/15/2022 0400  Gross per 24 hour   Intake --   Output 2050 ml   Net -2050 ml     Vitals:    02/15/22 0528 02/15/22 0736 02/15/22 0800 02/15/22 1110   BP:  139/66  (!) 182/74   BP Location:  Right arm  Left arm   Patient Position:  Lying  Lying   Pulse:  64 66 60   Resp: 17 17  19   Temp:  98.2 °F (36.8 °C)  98.5 °F (36.9 °C)   TempSrc:  Oral  Oral   SpO2:  97%  96%   Weight:       Height:           Labs:     Recent Labs   Lab 02/09/22  0000 02/11/22  0849 02/12/22  0404 02/14/22  0355 02/15/22  0818   *   < > 132* 131* 131*   K 4.8   < > 3.9 4.0 4.5   CL 97   < > 97 99 100   CO2  --    < > 24 22* 25   BUN  --    < > 37* 21 27*   CREATININE 2.66*   < > 2.2* 1.7* 2.0*   CALCIUM 9.0   < > 9.1 8.8 9.5   PHOS 7.8*  --  5.0*  --  4.4    < > = values in this interval not displayed.     Magnesium   Date Value Ref Range Status   02/04/2022 1.6 1.6 - 2.6 mg/dL Final           A/P  Cecile Bowen is a 71 y.o. female w/ PMHx ESRD 2/2 diabetes and recurrent UTIs on HD MWF Admitted on 2/11/2022 for hypotension found to have UTI.   Urology plan: will tentatively plan for ureteroscopy, possible laser lithotripsy, possible stone basket extraction, possible stent replacement on Friday, 2/18/22      Plan:   -There is no immediate indication for RRT at this time   -will continue to check her labs tomorrow morning, however if these remain stable and pt continues to have good  urine output, pt will need outpatient f/u with nephrology post-discharge      Patient case & plan was discussed with attending, Dr. Clif Feliz M.D.   Nephrology  [facility]

## 2022-02-16 LAB
ANION GAP SERPL CALC-SCNC: 11 MMOL/L (ref 8–16)
BACTERIA BLD CULT: NORMAL
BACTERIA BLD CULT: NORMAL
BUN SERPL-MCNC: 30 MG/DL (ref 8–23)
CALCIUM SERPL-MCNC: 9.3 MG/DL (ref 8.7–10.5)
CHLORIDE SERPL-SCNC: 100 MMOL/L (ref 95–110)
CO2 SERPL-SCNC: 22 MMOL/L (ref 23–29)
CREAT SERPL-MCNC: 2.1 MG/DL (ref 0.5–1.4)
EST. GFR  (AFRICAN AMERICAN): 27.1 ML/MIN/1.73 M^2
EST. GFR  (NON AFRICAN AMERICAN): 23.5 ML/MIN/1.73 M^2
GLUCOSE SERPL-MCNC: 138 MG/DL (ref 70–110)
POCT GLUCOSE: 125 MG/DL (ref 70–110)
POCT GLUCOSE: 128 MG/DL (ref 70–110)
POCT GLUCOSE: 147 MG/DL (ref 70–110)
POCT GLUCOSE: 152 MG/DL (ref 70–110)
POCT GLUCOSE: 174 MG/DL (ref 70–110)
POTASSIUM SERPL-SCNC: 4.6 MMOL/L (ref 3.5–5.1)
SODIUM SERPL-SCNC: 133 MMOL/L (ref 136–145)

## 2022-02-16 PROCEDURE — 80048 BASIC METABOLIC PNL TOTAL CA: CPT | Mod: HCNC | Performed by: PHYSICIAN ASSISTANT

## 2022-02-16 PROCEDURE — 11000001 HC ACUTE MED/SURG PRIVATE ROOM: Mod: HCNC

## 2022-02-16 PROCEDURE — 63600175 PHARM REV CODE 636 W HCPCS: Mod: HCNC | Performed by: INTERNAL MEDICINE

## 2022-02-16 PROCEDURE — 94761 N-INVAS EAR/PLS OXIMETRY MLT: CPT | Mod: HCNC

## 2022-02-16 PROCEDURE — 36415 COLL VENOUS BLD VENIPUNCTURE: CPT | Mod: HCNC | Performed by: PHYSICIAN ASSISTANT

## 2022-02-16 PROCEDURE — 99232 SBSQ HOSP IP/OBS MODERATE 35: CPT | Mod: HCNC,,, | Performed by: STUDENT IN AN ORGANIZED HEALTH CARE EDUCATION/TRAINING PROGRAM

## 2022-02-16 PROCEDURE — 63600175 PHARM REV CODE 636 W HCPCS: Mod: HCNC | Performed by: PHYSICIAN ASSISTANT

## 2022-02-16 PROCEDURE — 25000003 PHARM REV CODE 250: Mod: HCNC | Performed by: PHYSICIAN ASSISTANT

## 2022-02-16 PROCEDURE — 25000003 PHARM REV CODE 250: Mod: HCNC | Performed by: STUDENT IN AN ORGANIZED HEALTH CARE EDUCATION/TRAINING PROGRAM

## 2022-02-16 PROCEDURE — 99232 PR SUBSEQUENT HOSPITAL CARE,LEVL II: ICD-10-PCS | Mod: HCNC,,, | Performed by: STUDENT IN AN ORGANIZED HEALTH CARE EDUCATION/TRAINING PROGRAM

## 2022-02-16 RX ORDER — SULFAMETHOXAZOLE AND TRIMETHOPRIM 800; 160 MG/1; MG/1
1 TABLET ORAL DAILY
Status: COMPLETED | OUTPATIENT
Start: 2022-02-16 | End: 2022-02-19

## 2022-02-16 RX ADMIN — VENLAFAXINE HYDROCHLORIDE 150 MG: 150 CAPSULE, EXTENDED RELEASE ORAL at 08:02

## 2022-02-16 RX ADMIN — SEVELAMER CARBONATE 800 MG: 800 TABLET, FILM COATED ORAL at 04:02

## 2022-02-16 RX ADMIN — SEVELAMER CARBONATE 800 MG: 800 TABLET, FILM COATED ORAL at 12:02

## 2022-02-16 RX ADMIN — HYDROCODONE BITARTRATE AND ACETAMINOPHEN 1 TABLET: 10; 325 TABLET ORAL at 10:02

## 2022-02-16 RX ADMIN — LISINOPRIL 40 MG: 20 TABLET ORAL at 08:02

## 2022-02-16 RX ADMIN — CLONIDINE HYDROCHLORIDE 0.2 MG: 0.2 TABLET ORAL at 04:02

## 2022-02-16 RX ADMIN — SEVELAMER CARBONATE 800 MG: 800 TABLET, FILM COATED ORAL at 08:02

## 2022-02-16 RX ADMIN — CLONIDINE HYDROCHLORIDE 0.2 MG: 0.2 TABLET ORAL at 12:02

## 2022-02-16 RX ADMIN — Medication 400 MG: at 08:02

## 2022-02-16 RX ADMIN — CLONIDINE HYDROCHLORIDE 0.2 MG: 0.2 TABLET ORAL at 08:02

## 2022-02-16 RX ADMIN — OXYBUTYNIN CHLORIDE 10 MG: 10 TABLET, EXTENDED RELEASE ORAL at 08:02

## 2022-02-16 RX ADMIN — INSULIN ASPART 4 UNITS: 100 INJECTION, SOLUTION INTRAVENOUS; SUBCUTANEOUS at 12:02

## 2022-02-16 RX ADMIN — FAMOTIDINE 20 MG: 20 TABLET ORAL at 08:02

## 2022-02-16 RX ADMIN — APIXABAN 2.5 MG: 2.5 TABLET, FILM COATED ORAL at 08:02

## 2022-02-16 RX ADMIN — ANASTROZOLE 1 MG: 1 TABLET, COATED ORAL at 08:02

## 2022-02-16 RX ADMIN — INSULIN DETEMIR 13 UNITS: 100 INJECTION, SOLUTION SUBCUTANEOUS at 08:02

## 2022-02-16 RX ADMIN — INSULIN ASPART 4 UNITS: 100 INJECTION, SOLUTION INTRAVENOUS; SUBCUTANEOUS at 05:02

## 2022-02-16 RX ADMIN — METOPROLOL TARTRATE 25 MG: 25 TABLET, FILM COATED ORAL at 08:02

## 2022-02-16 RX ADMIN — MEROPENEM AND SODIUM CHLORIDE 1 G: 1 INJECTION, SOLUTION INTRAVENOUS at 05:02

## 2022-02-16 RX ADMIN — METHOCARBAMOL 750 MG: 750 TABLET ORAL at 10:02

## 2022-02-16 RX ADMIN — METHOCARBAMOL 750 MG: 750 TABLET ORAL at 08:02

## 2022-02-16 RX ADMIN — LEVOTHYROXINE SODIUM 50 MCG: 50 TABLET ORAL at 05:02

## 2022-02-16 RX ADMIN — SULFAMETHOXAZOLE AND TRIMETHOPRIM 1 TABLET: 800; 160 TABLET ORAL at 10:02

## 2022-02-16 RX ADMIN — SUMATRIPTAN SUCCINATE 100 MG: 50 TABLET ORAL at 08:02

## 2022-02-16 RX ADMIN — ATORVASTATIN CALCIUM 80 MG: 40 TABLET, FILM COATED ORAL at 08:02

## 2022-02-16 RX ADMIN — TRAZODONE HYDROCHLORIDE 100 MG: 100 TABLET ORAL at 08:02

## 2022-02-16 RX ADMIN — METHOCARBAMOL 750 MG: 750 TABLET ORAL at 04:02

## 2022-02-16 RX ADMIN — HYDROCODONE BITARTRATE AND ACETAMINOPHEN 1 TABLET: 10; 325 TABLET ORAL at 04:02

## 2022-02-16 RX ADMIN — INSULIN ASPART 4 UNITS: 100 INJECTION, SOLUTION INTRAVENOUS; SUBCUTANEOUS at 08:02

## 2022-02-16 RX ADMIN — HEPARIN SODIUM 5000 UNITS: 5000 INJECTION INTRAVENOUS; SUBCUTANEOUS at 05:02

## 2022-02-16 RX ADMIN — SUMATRIPTAN SUCCINATE 100 MG: 50 TABLET ORAL at 12:02

## 2022-02-16 NOTE — SUBJECTIVE & OBJECTIVE
Scheduled Meds:   anastrozole  1 mg Oral Daily    apixaban  2.5 mg Oral BID    atorvastatin  80 mg Oral Daily    cloNIDine  0.2 mg Oral TID    famotidine  20 mg Oral Daily    insulin aspart U-100  4 Units Subcutaneous TIDWM    insulin detemir U-100  13 Units Subcutaneous Daily    levothyroxine  50 mcg Oral Before breakfast    lisinopriL  40 mg Oral Daily    magnesium oxide  400 mg Oral BID    metoprolol tartrate  25 mg Oral BID    oxybutynin  10 mg Oral Daily    sevelamer carbonate  800 mg Oral TID WM    sulfamethoxazole-trimethoprim 800-160mg  1 tablet Oral Daily    venlafaxine  150 mg Oral Daily     Continuous Infusions:  PRN Meds:acetaminophen, albuterol-ipratropium, bisacodyL, cloNIDine, dextrose 10%, dextrose 10%, glucagon (human recombinant), glucose, glucose, heparin (porcine), HYDROcodone-acetaminophen, insulin aspart U-100, magnesium oxide, magnesium oxide, melatonin, methocarbamoL, naloxone, ondansetron, polyethylene glycol, promethazine, sodium chloride 0.9%, sumatriptan, traZODone    Review of patient's allergies indicates:  No Known Allergies     Past Medical History:   Diagnosis Date    Cervicogenic migraine     Chronic pain     CKD (chronic kidney disease) stage 4, GFR 15-29 ml/min     [facility]    CKD (chronic kidney disease) stage 4, GFR 15-29 ml/min     Diabetes mellitus     Long term use of Insulin, Diabetic Neuropathy    Fibromyalgia     Hydronephrosis     Hyperlipidemia     Hypertension 12/12/2012    Hypothyroidism 12/12/2012    ARON (iron deficiency anemia)     Insomnia     Levoscoliosis     Malignant neoplasm of upper-outer quadrant of left breast in female, estrogen receptor positive     Metabolic acidosis     Mobility impaired     Nuclear sclerosis - Both Eyes 3/24/2014    VIJAYA (obstructive sleep apnea)     Osteopenia     Pulmonary nodule     Recurrent UTI     Renal manifestation of secondary diabetes mellitus     Secondary hyperparathyroidism, renal      Urinary retention      Past Surgical History:   Procedure Laterality Date    BREAST BIOPSY Right     benign    CHOLECYSTECTOMY      COLONOSCOPY N/A 1/13/2017    Procedure: COLONOSCOPY;  Surgeon: Morris Wiseman MD;  Location: [facility] ENDO (4TH FLR);  Service: Endoscopy;  Laterality: N/A;  Renal pt Nephrology advised to avoid phosphate preps    CYSTOSCOPY N/A 10/8/2018    Procedure: CYSTOSCOPY;  Surgeon: Ronel Whittington MD;  Location: [facility] OR 1ST FLR;  Service: Urology;  Laterality: N/A;  45 min    CYSTOSCOPY N/A 3/25/2019    Procedure: CYSTOSCOPY;  Surgeon: Ronel Whittington MD;  Location: [facility] OR 1ST FLR;  Service: Urology;  Laterality: N/A;  45 min    CYSTOSCOPY N/A 8/26/2019    Procedure: CYSTOSCOPY;  Surgeon: Ronel Whittington MD;  Location: [facility] OR 1ST FLR;  Service: Urology;  Laterality: N/A;  45 min    CYSTOSCOPY N/A 7/2/2021    Procedure: CYSTOSCOPY;  Surgeon: Ronel Whittington MD;  Location: [facility] OR 1ST FLR;  Service: Urology;  Laterality: N/A;    CYSTOSCOPY  12/23/2021    Procedure: CYSTOSCOPY;  Surgeon: Ronel Whittington MD;  [facility];  Service: Urology;;    CYSTOSCOPY W/ URETERAL STENT PLACEMENT Left 5/15/2021    Procedure: CYSTOSCOPY, WITH URETERAL STENT INSERTION;  Surgeon: Levy Sánchez Jr., MD;  [facility];  Service: Urology;  Laterality: Left;    CYSTOSCOPY WITH BIOPSY OF BLADDER N/A 1/27/2020    Procedure: CYSTOSCOPY, WITH BLADDER BIOPSY, WITH FULGURATION IF INDICATED;  Surgeon: Ronel Whittington MD;  Location: [facility] OR 1ST FLR;  Service: Urology;  Laterality: N/A;    DILATION AND CURETTAGE OF UTERUS      GALLBLADDER SURGERY  2006    HYSTERECTOMY      INJECTION FOR SENTINEL NODE IDENTIFICATION Left 8/1/2019    Procedure: INJECTION, FOR SENTINEL NODE IDENTIFICATION;  Surgeon: Samia Fulton MD;  Location: [facility] OR 2ND FLR;  Service: General;  Laterality: Left;    INJECTION OF BOTULINUM TOXIN TYPE A N/A 10/8/2018     Procedure: INJECTION, BOTULINUM TOXIN, TYPE A 300 UNITS;  Surgeon: Ronel Whittington MD;  [facility];  Service: Urology;  Laterality: N/A;    INJECTION OF BOTULINUM TOXIN TYPE A N/A 3/25/2019    Procedure: INJECTION, BOTULINUM TOXIN, TYPE A 300 UNITS;  Surgeon: Ronel Whittingotn MD;  [facility];  Service: Urology;  Laterality: N/A;    INJECTION OF BOTULINUM TOXIN TYPE A N/A 8/26/2019    Procedure: INJECTION, BOTULINUM TOXIN, TYPE A 300 UNITS;  Surgeon: Ronel Whittington MD;  [facility];  Service: Urology;  Laterality: N/A;    MASTECTOMY Left 8/1/2019    Procedure: MASTECTOMY 23 hour stay;  Surgeon: Samia Fulton MD;  Location: [facility] OR 2ND FLR;  Service: General;  Laterality: Left;    OVARIAN CYST SURGERY  1985    REPLACEMENT OF STENT Left 12/23/2021    Procedure: REPLACEMENT, STENT;  Surgeon: Ronel Whittington MD;  [facility];  Service: Urology;  Laterality: Left;    SENTINEL LYMPH NODE BIOPSY Left 8/1/2019    Procedure: BIOPSY, LYMPH NODE, SENTINEL;  Surgeon: Samia Fulton MD;  [facility];  Service: General;  Laterality: Left;    spt placement      TONSILLECTOMY, ADENOIDECTOMY      TOTAL ABDOMINAL HYSTERECTOMY W/ BILATERAL SALPINGOOPHORECTOMY  1985       Family History     Problem Relation (Age of Onset)    ALS Mother    Cancer Maternal Grandmother, Paternal Grandfather, Brother    Diabetes Sister, Maternal Aunt, Maternal Uncle    Kidney disease Sister, Maternal Aunt    Prostate cancer Brother    Scoliosis Brother        Tobacco Use    Smoking status: Never Smoker    Smokeless tobacco: Never Used   Substance and Sexual Activity    Alcohol use: No     Alcohol/week: 0.0 standard drinks    Drug use: No    Sexual activity: Not Currently     Birth control/protection: Abstinence     Review of Systems   Skin: Positive for wound.       Objective:     Vital Signs (Most Recent):  Temp: 98.6 °F (37 °C) (02/16/22  1120)  Pulse: 62 (02/16/22 1120)  Resp: 19 (02/16/22 1120)  BP: (!) 160/71 (02/16/22 1120)  SpO2: 97 % (02/16/22 1120) Vital Signs (24h Range):  Temp:  [97 °F (36.1 °C)-98.7 °F (37.1 °C)] 98.6 °F (37 °C)  Pulse:  [58-80] 62  Resp:  [16-19] 19  SpO2:  [95 %-99 %] 97 %  BP: (151-197)/(66-86) 160/71     Weight: 105.2 kg (231 lb 14.8 oz)  Body mass index is 41.08 kg/m².  Physical Exam  Constitutional:       Appearance: Normal appearance.   Skin:     General: Skin is warm and dry.      Findings: Lesion present.   Neurological:      Mental Status: She is alert.         Laboratory:  All pertinent labs reviewed within the last 24 hours.    Diagnostic Results:  None

## 2022-02-16 NOTE — CONSULTS
Thank you for your consult to [facility]. We have reviewed the patient chart. This patient does not meet criteria for [facility] service at this time due to [facility] service capacity limitations. Will hand back to In-house service..    Dorothy Kraus MD

## 2022-02-16 NOTE — PROGRESS NOTES
[facility] - Telemetry [facility] ([address] Medicine  Progress Note    Patient Name: Cecile Bowen  MRN: [id number]  Patient Class: IP- Inpatient   Admission Date: 2/11/2022  Length of Stay: 2 days  Attending Physician: Autumn Hoover MD  Primary Care Provider: Kailey Navarro MD        Subjective:     Principal Problem:Acute cystitis with hematuria        HPI:  Cecile Bowen is a 71 y.o. female with PMHx significant for HTN, HLD, hx of ESBL UTI, ESRD on HD admitted to observation for hypotension, found to have a UTI. Patient was at her dialysis clinic today when she was found to have a BP of 76/43 and advised to come to the ED for evaluation. Reports her SBPs usually run in the 120s. Endorses cloudy urine in her catheter bag for the past few days, and reports it was last changed about a week ago. Denies lightheadedness, dizziness, SOB, fatigue, weakness, HA, CP, cough, abdominal pain, n/v. Patient was recently hospitalized for a ESBL UTI for which she completed her course of meropenem. Of note, patient also has a right subclavian dialysis access that was also changed about a week ago.     In the ED, BP 94/24 improved to 102/57. Remaining VSS. WBC 8.06. Lactic 1.8. Procal 0.12. Hgb 8.5 (~BL). . Cr 2.3 (~BL). Glucose 224. UA with 1+ leuks, 28 RBCs, >100 WBCs. CXR with no acute processes. Given Vanc 2g x 1.       Overview/Hospital Course:  Admitted to  for possible UTI. Cultures obtained, suprapubic catheter changes apparently in the ED, and started Abx. Nephrology consulted for HD. Pt continued on broad antibiotics w/improvement in signs and symptoms. Final culture results w/klebsiella pneumonia- susceptible to connie and bactrim. Urology evaluated patient, 02/14. Pt w/h/o Lt ureteral stone- s/p stent placement 06/2021 and replacement 12/2021- intermittently lost to follow-up. Planning for ureteroscopy 02/18 w/definitive stone management and stent removal as patient has high likelihood  to continue to be lost to f/u. Agreed that it was in the best interest of the patient to remain inpatient to undergo further interventions/evaluations via urology. Pt was transitioned to bactrim 02/16- connie stopped.      Interval History: Patient was seen and evaluated this am. Elevated BP 170s- administered increased dose of lisinopril 40mg qday- repeat BP decreasing to 157/82mmhg. Denies nausea, vomiting, fevers, chills, night sweats, abd pain, headache. Still w/back pain.     Pt agreeable to staying inpatient while awaiting ureteroscopy 02/18.    Objective:     Vital Signs (Most Recent):  Temp: 98.6 °F (37 °C) (02/16/22 1120)  Pulse: 62 (02/16/22 1120)  Resp: 19 (02/16/22 1120)  BP: (!) 160/71 (02/16/22 1120)  SpO2: 97 % (02/16/22 1120) Vital Signs (24h Range):  Temp:  [97 °F (36.1 °C)-98.7 °F (37.1 °C)] 98.6 °F (37 °C)  Pulse:  [58-80] 62  Resp:  [16-19] 19  SpO2:  [95 %-99 %] 97 %  BP: (151-197)/(66-86) 160/71     Weight: 105.2 kg (231 lb 14.8 oz)  Body mass index is 41.08 kg/m².    Intake/Output Summary (Last 24 hours) at 2/16/2022 1324  Last data filed at 2/16/2022 1244  Gross per 24 hour   Intake --   Output 3850 ml   Net -3850 ml      Physical Exam  Gen: in NAD, appears stated age  Neuro: AAOx3, motor, sensory, and strength grossly intact BL  HEENT: NTNC, EOMI, PERRL, MMM  CVS: RRR, no m/r/g; S1/S2 auscultated with no S3 or S4; capillary refill < 2 sec  Chest: TDC of right chest wall  Resp: lungs CTAB, no w/r/r; no belabored breathing or accessory muscle use appreciated   Abd: BS+ in all 4 quadrants; NTND, soft to palpation; no organomegaly appreciated   : suprapubic catheter in place w/clear yellow output, no purulence- no surrounding erythema surrounding site  Extrem: pulses full, equal, and regular over all 4 extremities; no UE or LE edema BL    Significant Labs:   All pertinent labs within the past 24 hours have been reviewed.  Blood Culture: No results for input(s): LABBLOO in the last 48  hours.  CBC:   No results for input(s): WBC, HGB, HCT, PLT in the last 48 hours.  CMP:   Recent Labs   Lab 02/15/22  0818 02/16/22  0358   * 133*   K 4.5 4.6    100   CO2 25 22*   * 138*   BUN 27* 30*   CREATININE 2.0* 2.1*   CALCIUM 9.5 9.3   ALBUMIN 2.0*  --    ANIONGAP 6* 11   EGFRNONAA 24.9* 23.5*     Urine Culture: No results for input(s): LABURIN in the last 48 hours.  Urine Studies: No results for input(s): COLORU, APPEARANCEUA, PHUR, SPECGRAV, PROTEINUA, GLUCUA, KETONESU, BILIRUBINUA, OCCULTUA, NITRITE, UROBILINOGEN, LEUKOCYTESUR, RBCUA, WBCUA, BACTERIA, SQUAMEPITHEL, HYALINECASTS in the last 48 hours.    Invalid input(s): WRIGHTSUR    Significant Imaging: I have reviewed all pertinent imaging results/findings within the past 24 hours.     CXR 02/11:  Impression:     No significant intrathoracic abnormality or detrimental interval change in the appearance of the chest since 01/21/2022 at 13:20 appreciated, noting that the current examination demonstrates a poorer inspiratory depth level than that prior study.      Assessment/Plan:      * Acute cystitis with hematuria  - SIRS: 0/4; hypotension  - Hx of ESBL UTI; completed course of meropenem  - UA with 1+ leuks, 28 RBCs, >100 WBCs  - UCx w/GNR >100,000- susceptibilities to connie and bactrim- will continue antibiotics leading up to ureteroscopy and dc post-op- stop connie today- transition to bactrim DS qday until sat- 02/19  - Given Vanc in ED   - suprapubic catheter changed in the ED    Left ureteral stone  - h/o left ureteral stone and indwelling left ureteral stent  - stent placed 06/30/21- pt lost to fu- stent exchanged 12/23/21  - urology following: tentatively plan for ureteroscopy w/laser lithotripsy vs. Stone basket extraction vs. Stent replacement 02/18/22    Secondary hypertension  - Continue lopressor, lisinopril and clonidine  - Holding lasix   - Titrate BP meds accordingly    CKD (chronic kidney disease) requiring chronic  dialysis  - HD MWF  - Nephrology following  - continue sevelamer carbonate 800mg TIDW    Pressure injury of right buttock, stage 3  - present on admission  - no infection on examination  - wound care consulted, appreciate recs  - turn patient q2h    Urinary tract infection due to ESBL Klebsiella  - hx of    A-fib  - continue Eliquis 2.5mg BID  - continue lopressor 25mg BID today   - tele    Malignant neoplasm of left breast, estrogen receptor positive  - continue home anastrozole 1mg daily    Chronic pain  - follows w/pain specialist  - Improved back pain   - continue norco 10mg q6h for mod pain    Suprapubic catheter  - exchanged in the ED    Uncontrolled type 2 diabetes mellitus with stage 4 chronic kidney disease, with long-term current use of insulin  - uncontrolled on admit  - continue Levemir 13 units daily, Novolog 4 units TIDW, SSI  - up titrate as needed  - POCT glucose ACHS  - hypoglycemic protocol    Hemoglobin A1C   Date Value Ref Range Status   01/03/2022 5.4 4.8 - 5.9 % Final   12/21/2021 5.3 4.0 - 5.6 % Final     Comment:     ADA Screening Guidelines:  5.7-6.4%  Consistent with prediabetes  >or=6.5%  Consistent with diabetes    High levels of fetal hemoglobin interfere with the HbA1C  assay. Heterozygous hemoglobin variants (HbS, HgC, etc)do  not significantly interfere with this assay.   However, presence of multiple variants may affect accuracy.     09/05/2021 6.6 (H) 4.0 - 5.6 % Final     Comment:     ADA Screening Guidelines:  5.7-6.4%  Consistent with prediabetes  >or=6.5%  Consistent with diabetes    High levels of fetal hemoglobin interfere with the HbA1C  assay. Heterozygous hemoglobin variants (HbS, HgC, etc)do  not significantly interfere with this assay.   However, presence of multiple variants may affect accuracy.           Mixed migraine and muscle contraction headache  - stable  - home Fioricet and sumatriptan prn    Neurogenic bladder  - noted, has suprapubic catheter     Urinary  retention  - continue home oxybutynin 10mg daily    Recurrent urinary tract infection  - see above    Hyperlipidemia associated with type 2 diabetes mellitus  - continue Lipitor 80mg daily    Hypothyroidism  - continue home synthroid 50mcg    VTE Risk Mitigation (From admission, onward)         Ordered     heparin (porcine) injection 1,000 Units  As needed (PRN)         02/12/22 1104     apixaban tablet 2.5 mg  2 times daily         02/11/22 1342     IP VTE HIGH RISK PATIENT  Once         02/11/22 1132     Place sequential compression device  Until discontinued         02/11/22 1132                Discharge Planning   FLORENTINO: 2/20/2022     Code Status: Full Code   Is the patient medically ready for discharge?: No    Reason for patient still in hospital (select all that apply): Patient trending condition  Discharge Plan A: Home Health              Autumn Hoover MD  Department of Hospital Medicine   [facility] - Telemetry Stepdown ([place]-7)

## 2022-02-16 NOTE — SUBJECTIVE & OBJECTIVE
Interval History: Patient was seen and evaluated this am. Elevated BP 170s- administered increased dose of lisinopril 40mg qday- repeat BP decreasing to 157/82mmhg. Denies nausea, vomiting, fevers, chills, night sweats, abd pain, headache. Still w/back pain.     Pt agreeable to staying inpatient while awaiting ureteroscopy 02/18.    Objective:     Vital Signs (Most Recent):  Temp: 98.6 °F (37 °C) (02/16/22 1120)  Pulse: 62 (02/16/22 1120)  Resp: 19 (02/16/22 1120)  BP: (!) 160/71 (02/16/22 1120)  SpO2: 97 % (02/16/22 1120) Vital Signs (24h Range):  Temp:  [97 °F (36.1 °C)-98.7 °F (37.1 °C)] 98.6 °F (37 °C)  Pulse:  [58-80] 62  Resp:  [16-19] 19  SpO2:  [95 %-99 %] 97 %  BP: (151-197)/(66-86) 160/71     Weight: 105.2 kg (231 lb 14.8 oz)  Body mass index is 41.08 kg/m².    Intake/Output Summary (Last 24 hours) at 2/16/2022 1324  Last data filed at 2/16/2022 1244  Gross per 24 hour   Intake --   Output 3850 ml   Net -3850 ml      Physical Exam  Gen: in NAD, appears stated age  Neuro: AAOx3, motor, sensory, and strength grossly intact BL  HEENT: NTNC, EOMI, PERRL, MMM  CVS: RRR, no m/r/g; S1/S2 auscultated with no S3 or S4; capillary refill < 2 sec  Chest: TDC of right chest wall  Resp: lungs CTAB, no w/r/r; no belabored breathing or accessory muscle use appreciated   Abd: BS+ in all 4 quadrants; NTND, soft to palpation; no organomegaly appreciated   : suprapubic catheter in place w/clear yellow output, no purulence- no surrounding erythema surrounding site  Extrem: pulses full, equal, and regular over all 4 extremities; no UE or LE edema BL    Significant Labs:   All pertinent labs within the past 24 hours have been reviewed.  Blood Culture: No results for input(s): LABBLOO in the last 48 hours.  CBC:   No results for input(s): WBC, HGB, HCT, PLT in the last 48 hours.  CMP:   Recent Labs   Lab 02/15/22  0818 02/16/22  0358   * 133*   K 4.5 4.6    100   CO2 25 22*   * 138*   BUN 27* 30*   CREATININE  2.0* 2.1*   CALCIUM 9.5 9.3   ALBUMIN 2.0*  --    ANIONGAP 6* 11   EGFRNONAA 24.9* 23.5*     Urine Culture: No results for input(s): LABURIN in the last 48 hours.  Urine Studies: No results for input(s): COLORU, APPEARANCEUA, PHUR, SPECGRAV, PROTEINUA, GLUCUA, KETONESU, BILIRUBINUA, OCCULTUA, NITRITE, UROBILINOGEN, LEUKOCYTESUR, RBCUA, WBCUA, BACTERIA, SQUAMEPITHEL, HYALINECASTS in the last 48 hours.    Invalid input(s): WRIGHTSUR    Significant Imaging: I have reviewed all pertinent imaging results/findings within the past 24 hours.     CXR 02/11:  Impression:     No significant intrathoracic abnormality or detrimental interval change in the appearance of the chest since 01/21/2022 at 13:20 appreciated, noting that the current examination demonstrates a poorer inspiratory depth level than that prior study.

## 2022-02-16 NOTE — HPI
Cecile Bowen is a 71 year old female with PMHx significant for HTN, HLD, hx of ESBL UTI, ESRD on HD admitted to observation for hypotension, found to have a UTI. Patient was at her dialysis clinic when she was found to have a BP of 76/43 and advised to come to the ED for evaluation. Reports her SBPs usually run in the 120s. Endorses cloudy urine in her catheter bag for the past few days, and reports it was last changed about a week ago. Wound care is consulted for evaluation of skin breakdown.

## 2022-02-16 NOTE — PLAN OF CARE
Problem: UTI (Urinary Tract Infection)  Goal: Improved Infection Symptoms  Outcome: Ongoing, Progressing     Problem: Hypertension Acute  Goal: Blood Pressure Within Desired Range  Outcome: Ongoing, Progressing  Patient Aox4. Remains on RA. SP catheter in place, yellow urine. HS glucose WNL, no insulin required. ABX given as scheduled for UTI. BP elevated throughout the night; scheduled cardiac meds & PRN clonidine given x1. C/o migraine, PRN imitrex given x1. All needs met overnight.

## 2022-02-16 NOTE — CONSULTS
[facility] - Telemetry Stepdown ([place]-7)  [facility]  Consult Note    Patient Name: Cecile Bowen  MRN: [id number]  Admission Date: 2/11/2022  Hospital Length of Stay: 2 days  Attending Physician: Autumn Hoover MD  Primary Care Provider: Kailey Navarro MD     Consults  Subjective:     History of Present Illness:  Cecile Bowen is a 71 year old female with PMHx significant for HTN, HLD, hx of ESBL UTI, ESRD on HD admitted to observation for hypotension, found to have a UTI. Patient was at her dialysis clinic when she was found to have a BP of 76/43 and advised to come to the ED for evaluation. Reports her SBPs usually run in the 120s. Endorses cloudy urine in her catheter bag for the past few days, and reports it was last changed about a week ago. Wound care is consulted for evaluation of skin breakdown.       Scheduled Meds:   anastrozole  1 mg Oral Daily    apixaban  2.5 mg Oral BID    atorvastatin  80 mg Oral Daily    cloNIDine  0.2 mg Oral TID    famotidine  20 mg Oral Daily    insulin aspart U-100  4 Units Subcutaneous TIDWM    insulin detemir U-100  13 Units Subcutaneous Daily    levothyroxine  50 mcg Oral Before breakfast    lisinopriL  40 mg Oral Daily    magnesium oxide  400 mg Oral BID    metoprolol tartrate  25 mg Oral BID    oxybutynin  10 mg Oral Daily    sevelamer carbonate  800 mg Oral TID WM    sulfamethoxazole-trimethoprim 800-160mg  1 tablet Oral Daily    venlafaxine  150 mg Oral Daily     Continuous Infusions:  PRN Meds:acetaminophen, albuterol-ipratropium, bisacodyL, cloNIDine, dextrose 10%, dextrose 10%, glucagon (human recombinant), glucose, glucose, heparin (porcine), HYDROcodone-acetaminophen, insulin aspart U-100, magnesium oxide, magnesium oxide, melatonin, methocarbamoL, naloxone, ondansetron, polyethylene glycol, promethazine, sodium chloride 0.9%, sumatriptan, traZODone    Review of patient's allergies indicates:  No Known Allergies     Past  Medical History:   Diagnosis Date    Cervicogenic migraine     Chronic pain     CKD (chronic kidney disease) stage 4, GFR 15-29 ml/min     Dr Piedadmoody    CKD (chronic kidney disease) stage 4, GFR 15-29 ml/min     Diabetes mellitus     Long term use of Insulin, Diabetic Neuropathy    Fibromyalgia     Hydronephrosis     Hyperlipidemia     Hypertension 12/12/2012    Hypothyroidism 12/12/2012    ARON (iron deficiency anemia)     Insomnia     Levoscoliosis     Malignant neoplasm of upper-outer quadrant of left breast in female, estrogen receptor positive     Metabolic acidosis     Mobility impaired     Nuclear sclerosis - Both Eyes 3/24/2014    VIJAYA (obstructive sleep apnea)     Osteopenia     Pulmonary nodule     Recurrent UTI     Renal manifestation of secondary diabetes mellitus     Secondary hyperparathyroidism, renal     Urinary retention      Past Surgical History:   Procedure Laterality Date    BREAST BIOPSY Right     benign    CHOLECYSTECTOMY      COLONOSCOPY N/A 1/13/2017    Procedure: COLONOSCOPY;  Surgeon: Morris Wiseman MD;  [facility] (4TH FLR);  Service: Endoscopy;  Laterality: N/A;  Renal pt Nephrology advised to avoid phosphate preps    CYSTOSCOPY N/A 10/8/2018    Procedure: CYSTOSCOPY;  Surgeon: Ronel Whittington MD;  Location: [address];  Service: Urology;  Laterality: N/A;  45 min    CYSTOSCOPY N/A 3/25/2019    Procedure: CYSTOSCOPY;  Surgeon: Ronel Whittington MD;  Location: [address];  Service: Urology;  Laterality: N/A;  45 min    CYSTOSCOPY N/A 8/26/2019    Procedure: CYSTOSCOPY;  Surgeon: Ronel Whittington MD;  Location: [address];  Service: Urology;  Laterality: N/A;  45 min    CYSTOSCOPY N/A 7/2/2021    Procedure: CYSTOSCOPY;  Surgeon: Ronel Whittington MD;  Location: [address];  Service: Urology;  Laterality: N/A;    CYSTOSCOPY  12/23/2021    Procedure: CYSTOSCOPY;  Surgeon: Ronel Whittington MD;   Location: [address];  Service: Urology;;    CYSTOSCOPY W/ URETERAL STENT PLACEMENT Left 5/15/2021    Procedure: CYSTOSCOPY, WITH URETERAL STENT INSERTION;  Surgeon: Levy Sánchez Jr., MD;  Location: [address];  Service: Urology;  Laterality: Left;    CYSTOSCOPY WITH BIOPSY OF BLADDER N/A 1/27/2020    Procedure: CYSTOSCOPY, WITH BLADDER BIOPSY, WITH FULGURATION IF INDICATED;  Surgeon: Ronel Whittington MD;  Location: [address];  Service: Urology;  Laterality: N/A;    DILATION AND CURETTAGE OF UTERUS      GALLBLADDER SURGERY  2006    HYSTERECTOMY      INJECTION FOR SENTINEL NODE IDENTIFICATION Left 8/1/2019    Procedure: INJECTION, FOR SENTINEL NODE IDENTIFICATION;  Surgeon: Samia Fulton MD;  Location: [address];  Service: General;  Laterality: Left;    INJECTION OF BOTULINUM TOXIN TYPE A N/A 10/8/2018    Procedure: INJECTION, BOTULINUM TOXIN, TYPE A 300 UNITS;  Surgeon: Ronel Whittington MD;  Location: [address];  Service: Urology;  Laterality: N/A;    INJECTION OF BOTULINUM TOXIN TYPE A N/A 3/25/2019    Procedure: INJECTION, BOTULINUM TOXIN, TYPE A 300 UNITS;  Surgeon: Ronel Whittnigton MD;  Location: [address];  Service: Urology;  Laterality: N/A;    INJECTION OF BOTULINUM TOXIN TYPE A N/A 8/26/2019    Procedure: INJECTION, BOTULINUM TOXIN, TYPE A 300 UNITS;  Surgeon: Ronel Whittington MD;  Location: [address];  Service: Urology;  Laterality: N/A;    MASTECTOMY Left 8/1/2019    Procedure: MASTECTOMY 23 hour stay;  Surgeon: Samia Fulton MD;  Location: [address];  Service: General;  Laterality: Left;    OVARIAN CYST SURGERY  1985    REPLACEMENT OF STENT Left 12/23/2021    Procedure: REPLACEMENT, STENT;  Surgeon: Ronel Whittington MD;  Location: [address];  Service: Urology;  Laterality: Left;    SENTINEL LYMPH NODE BIOPSY Left 8/1/2019    Procedure: BIOPSY, LYMPH NODE, SENTINEL;  Surgeon: Samia Fulton MD;  Location:  [facility] OR 2ND FLR;  Service: General;  Laterality: Left;    spt placement      TONSILLECTOMY, ADENOIDECTOMY      TOTAL ABDOMINAL HYSTERECTOMY W/ BILATERAL SALPINGOOPHORECTOMY  1985       Family History     Problem Relation (Age of Onset)    ALS Mother    Cancer Maternal Grandmother, Paternal Grandfather, Brother    Diabetes Sister, Maternal Aunt, Maternal Uncle    Kidney disease Sister, Maternal Aunt    Prostate cancer Brother    Scoliosis Brother        Tobacco Use    Smoking status: Never Smoker    Smokeless tobacco: Never Used   Substance and Sexual Activity    Alcohol use: No     Alcohol/week: 0.0 standard drinks    Drug use: No    Sexual activity: Not Currently     Birth control/protection: Abstinence     Review of Systems   Skin: Positive for wound.       Objective:     Vital Signs (Most Recent):  Temp: 98.6 °F (37 °C) (02/16/22 1120)  Pulse: 62 (02/16/22 1120)  Resp: 19 (02/16/22 1120)  BP: (!) 160/71 (02/16/22 1120)  SpO2: 97 % (02/16/22 1120) Vital Signs (24h Range):  Temp:  [97 °F (36.1 °C)-98.7 °F (37.1 °C)] 98.6 °F (37 °C)  Pulse:  [58-80] 62  Resp:  [16-19] 19  SpO2:  [95 %-99 %] 97 %  BP: (151-197)/(66-86) 160/71     Weight: 105.2 kg (231 lb 14.8 oz)  Body mass index is 41.08 kg/m².  Physical Exam  Constitutional:       Appearance: Normal appearance.   Skin:     General: Skin is warm and dry.      Findings: Lesion present.   Neurological:      Mental Status: She is alert.         Laboratory:  All pertinent labs reviewed within the last 24 hours.    Diagnostic Results:  None      Assessment/Plan:         [facility] Evaluation    [facility] evaluation of patient as part of the comprehensive skin care team.   She has been admitted for 5 days. Skin injury was noted on 11/30/21. POA yes.    Bilateral buttocks              Pressure injury of right buttock, stage 3  - pt is known to wound care team and assessed during previous admission in January.  - buttocks wounds are healing stage 3  pressure injuries complicated by moisture/shearing/friction.  - pt able to turn in bed with minimal assistance.  - encouraged strict q2h turning schedule.  - pt requested a foam wedge for offloading.  - wedge ordered. Discussed with RN and waffle overlay will be placed on mattress as well.  - nursing to maintain pressure injury prevention measures.         Thank you for your consult. I will follow-up with patient. Please contact us if you have any additional questions.         Anni Nelson NP  [facility]  [facility] - Telemetry Stepdown ([place]-7)

## 2022-02-16 NOTE — ASSESSMENT & PLAN NOTE
- pt is known to wound care team and assessed during previous admission in January.  - buttocks wounds are healing stage 3 pressure injuries complicated by moisture/shearing/friction.  - pt able to turn in bed with minimal assistance.  - encouraged strict q2h turning schedule.  - pt requested a foam wedge for offloading.  - wedge ordered. Discussed with RN and waffle overlay will be placed on mattress as well.  - nursing to maintain pressure injury prevention measures.

## 2022-02-16 NOTE — RESPIRATORY THERAPY
RAPID RESPONSE RESPIRATORY CHART CHECK       Chart check completed,  instructed to call [phone] for further concerns or assistance.

## 2022-02-16 NOTE — ASSESSMENT & PLAN NOTE
- SIRS: 0/4; hypotension  - Hx of ESBL UTI; completed course of meropenem  - UA with 1+ leuks, 28 RBCs, >100 WBCs  - UCx w/GNR >100,000- susceptibilities to connie and bactrim- will continue antibiotics leading up to ureteroscopy and dc post-op- stop connie today- transition to bactrim DS qday until sat- 02/19  - Given Vanc in ED   - suprapubic catheter changed in the ED

## 2022-02-17 ENCOUNTER — ANESTHESIA EVENT (OUTPATIENT)
Dept: SURGERY | Facility: HOSPITAL | Age: 72
DRG: 659 | End: 2022-02-17
Payer: MEDICARE

## 2022-02-17 LAB
ALBUMIN SERPL BCP-MCNC: 2 G/DL (ref 3.5–5.2)
ALP SERPL-CCNC: 106 U/L (ref 55–135)
ALT SERPL W/O P-5'-P-CCNC: 9 U/L (ref 10–44)
ANION GAP SERPL CALC-SCNC: 11 MMOL/L (ref 8–16)
AST SERPL-CCNC: 10 U/L (ref 10–40)
BILIRUB SERPL-MCNC: 0.2 MG/DL (ref 0.1–1)
BUN SERPL-MCNC: 34 MG/DL (ref 8–23)
CALCIUM SERPL-MCNC: 9.1 MG/DL (ref 8.7–10.5)
CHLORIDE SERPL-SCNC: 100 MMOL/L (ref 95–110)
CO2 SERPL-SCNC: 21 MMOL/L (ref 23–29)
CREAT SERPL-MCNC: 2.1 MG/DL (ref 0.5–1.4)
EST. GFR  (AFRICAN AMERICAN): 27.1 ML/MIN/1.73 M^2
EST. GFR  (NON AFRICAN AMERICAN): 23.5 ML/MIN/1.73 M^2
GLUCOSE SERPL-MCNC: 143 MG/DL (ref 70–110)
MAGNESIUM SERPL-MCNC: 2 MG/DL (ref 1.6–2.6)
PHOSPHATE SERPL-MCNC: 4.5 MG/DL (ref 2.7–4.5)
POCT GLUCOSE: 127 MG/DL (ref 70–110)
POCT GLUCOSE: 181 MG/DL (ref 70–110)
POCT GLUCOSE: 186 MG/DL (ref 70–110)
POCT GLUCOSE: 188 MG/DL (ref 70–110)
POTASSIUM SERPL-SCNC: 4.6 MMOL/L (ref 3.5–5.1)
PROT SERPL-MCNC: 5.6 G/DL (ref 6–8.4)
SODIUM SERPL-SCNC: 132 MMOL/L (ref 136–145)

## 2022-02-17 PROCEDURE — 25000003 PHARM REV CODE 250: Mod: HCNC | Performed by: STUDENT IN AN ORGANIZED HEALTH CARE EDUCATION/TRAINING PROGRAM

## 2022-02-17 PROCEDURE — 83735 ASSAY OF MAGNESIUM: CPT | Mod: HCNC | Performed by: STUDENT IN AN ORGANIZED HEALTH CARE EDUCATION/TRAINING PROGRAM

## 2022-02-17 PROCEDURE — 80053 COMPREHEN METABOLIC PANEL: CPT | Mod: HCNC | Performed by: STUDENT IN AN ORGANIZED HEALTH CARE EDUCATION/TRAINING PROGRAM

## 2022-02-17 PROCEDURE — 25000003 PHARM REV CODE 250: Mod: HCNC | Performed by: PHYSICIAN ASSISTANT

## 2022-02-17 PROCEDURE — A4216 STERILE WATER/SALINE, 10 ML: HCPCS | Mod: HCNC | Performed by: PHYSICIAN ASSISTANT

## 2022-02-17 PROCEDURE — 25000003 PHARM REV CODE 250: Mod: HCNC | Performed by: HOSPITALIST

## 2022-02-17 PROCEDURE — 99233 SBSQ HOSP IP/OBS HIGH 50: CPT | Mod: HCNC,95,, | Performed by: INTERNAL MEDICINE

## 2022-02-17 PROCEDURE — 36415 COLL VENOUS BLD VENIPUNCTURE: CPT | Mod: HCNC | Performed by: STUDENT IN AN ORGANIZED HEALTH CARE EDUCATION/TRAINING PROGRAM

## 2022-02-17 PROCEDURE — 84100 ASSAY OF PHOSPHORUS: CPT | Mod: HCNC | Performed by: STUDENT IN AN ORGANIZED HEALTH CARE EDUCATION/TRAINING PROGRAM

## 2022-02-17 PROCEDURE — 99233 PR SUBSEQUENT HOSPITAL CARE,LEVL III: ICD-10-PCS | Mod: HCNC,95,, | Performed by: INTERNAL MEDICINE

## 2022-02-17 PROCEDURE — 11000001 HC ACUTE MED/SURG PRIVATE ROOM: Mod: HCNC

## 2022-02-17 RX ADMIN — SULFAMETHOXAZOLE AND TRIMETHOPRIM 1 TABLET: 800; 160 TABLET ORAL at 08:02

## 2022-02-17 RX ADMIN — INSULIN DETEMIR 13 UNITS: 100 INJECTION, SOLUTION SUBCUTANEOUS at 10:02

## 2022-02-17 RX ADMIN — METOPROLOL TARTRATE 25 MG: 25 TABLET, FILM COATED ORAL at 08:02

## 2022-02-17 RX ADMIN — HYDROCODONE BITARTRATE AND ACETAMINOPHEN 1 TABLET: 10; 325 TABLET ORAL at 05:02

## 2022-02-17 RX ADMIN — ATORVASTATIN CALCIUM 80 MG: 40 TABLET, FILM COATED ORAL at 08:02

## 2022-02-17 RX ADMIN — METHOCARBAMOL 750 MG: 750 TABLET ORAL at 05:02

## 2022-02-17 RX ADMIN — SEVELAMER CARBONATE 800 MG: 800 TABLET, FILM COATED ORAL at 08:02

## 2022-02-17 RX ADMIN — METHOCARBAMOL 750 MG: 750 TABLET ORAL at 08:02

## 2022-02-17 RX ADMIN — CLONIDINE HYDROCHLORIDE 0.2 MG: 0.2 TABLET ORAL at 03:02

## 2022-02-17 RX ADMIN — CLONIDINE HYDROCHLORIDE 0.2 MG: 0.2 TABLET ORAL at 09:02

## 2022-02-17 RX ADMIN — SEVELAMER CARBONATE 800 MG: 800 TABLET, FILM COATED ORAL at 11:02

## 2022-02-17 RX ADMIN — FAMOTIDINE 20 MG: 20 TABLET ORAL at 08:02

## 2022-02-17 RX ADMIN — INSULIN ASPART 4 UNITS: 100 INJECTION, SOLUTION INTRAVENOUS; SUBCUTANEOUS at 05:02

## 2022-02-17 RX ADMIN — Medication 400 MG: at 08:02

## 2022-02-17 RX ADMIN — Medication 10 ML: at 08:02

## 2022-02-17 RX ADMIN — SUMATRIPTAN SUCCINATE 100 MG: 50 TABLET ORAL at 01:02

## 2022-02-17 RX ADMIN — METHOCARBAMOL 750 MG: 750 TABLET ORAL at 11:02

## 2022-02-17 RX ADMIN — OXYBUTYNIN CHLORIDE 10 MG: 10 TABLET, EXTENDED RELEASE ORAL at 08:02

## 2022-02-17 RX ADMIN — ACETAMINOPHEN 650 MG: 325 TABLET ORAL at 09:02

## 2022-02-17 RX ADMIN — INSULIN ASPART 4 UNITS: 100 INJECTION, SOLUTION INTRAVENOUS; SUBCUTANEOUS at 08:02

## 2022-02-17 RX ADMIN — HYDROCODONE BITARTRATE AND ACETAMINOPHEN 1 TABLET: 10; 325 TABLET ORAL at 11:02

## 2022-02-17 RX ADMIN — APIXABAN 2.5 MG: 2.5 TABLET, FILM COATED ORAL at 08:02

## 2022-02-17 RX ADMIN — VENLAFAXINE HYDROCHLORIDE 150 MG: 150 CAPSULE, EXTENDED RELEASE ORAL at 08:02

## 2022-02-17 RX ADMIN — TRAZODONE HYDROCHLORIDE 100 MG: 100 TABLET ORAL at 08:02

## 2022-02-17 RX ADMIN — LISINOPRIL 40 MG: 20 TABLET ORAL at 08:02

## 2022-02-17 RX ADMIN — SEVELAMER CARBONATE 800 MG: 800 TABLET, FILM COATED ORAL at 05:02

## 2022-02-17 RX ADMIN — HYDROCODONE BITARTRATE AND ACETAMINOPHEN 1 TABLET: 10; 325 TABLET ORAL at 08:02

## 2022-02-17 RX ADMIN — CLONIDINE HYDROCHLORIDE 0.2 MG: 0.2 TABLET ORAL at 08:02

## 2022-02-17 RX ADMIN — ANASTROZOLE 1 MG: 1 TABLET, COATED ORAL at 08:02

## 2022-02-17 RX ADMIN — LEVOTHYROXINE SODIUM 50 MCG: 50 TABLET ORAL at 05:02

## 2022-02-17 RX ADMIN — INSULIN ASPART 4 UNITS: 100 INJECTION, SOLUTION INTRAVENOUS; SUBCUTANEOUS at 12:02

## 2022-02-17 NOTE — SUBJECTIVE & OBJECTIVE
Interval History: AF. HDS. Urine output adequate. OR tomorrow    Review of Systems  Objective:     Temp:  [97.7 °F (36.5 °C)-98.6 °F (37 °C)] 97.7 °F (36.5 °C)  Pulse:  [58-80] 58  Resp:  [16-20] 18  SpO2:  [96 %-98 %] 96 %  BP: ()/(60-82) 143/65     Body mass index is 41.08 kg/m².           Drains     Drain                 Hemodialysis Catheter right internal jugular -- days         Hemodialysis Catheter right subclavian -- days         Suprapubic Catheter 01/23/22 1530 16 Fr. 24 days                Physical Exam  Constitutional:       Appearance: She is obese.   HENT:      Head: Normocephalic.   Eyes:      Pupils: Pupils are equal, round, and reactive to light.   Cardiovascular:      Rate and Rhythm: Normal rate.   Pulmonary:      Effort: Pulmonary effort is normal.   Abdominal:      Palpations: Abdomen is soft.   Genitourinary:     Comments: [place]  Musculoskeletal:         General: Normal range of motion.      Cervical back: Normal range of motion.   Neurological:      General: No focal deficit present.      Mental Status: She is alert.   Psychiatric:         Mood and Affect: Mood normal.         Significant Labs:    BMP:  Recent Labs   Lab 02/15/22  0818 02/16/22  0358 02/17/22  0333   * 133* 132*   K 4.5 4.6 4.6    100 100   CO2 25 22* 21*   BUN 27* 30* 34*   CREATININE 2.0* 2.1* 2.1*   CALCIUM 9.5 9.3 9.1       CBC:   Recent Labs   Lab 02/12/22  0404 02/13/22  0514 02/14/22  0355   WBC 6.51 5.58 5.76   HGB 8.1* 9.2* 9.2*   HCT 26.4* 29.7* 30.4*    347 332       All pertinent labs results from the past 24 hours have been reviewed.    Significant Imaging:  All pertinent imaging results/findings from the past 24 hours have been reviewed.

## 2022-02-17 NOTE — SUBJECTIVE & OBJECTIVE
Telemedicine  This service was provided by Virtual Visit.    Patient was transferred to [facility] Medicine on:  2/17/22     Chief Complaint   Patient presents with    Hypotension     Arrived via [facility] from dialysis center with c/o hypotension SBP 80s, sent for possible urosepsis, recent UTI diagnosis, did not receive dialysis today, last dialyzed wednesday       The [facility]   Admitted 2/11/2022  8:18 AM  Present with the patient at the time of the telemed/virtual assessment: Telepresenter    Interval History / Events Overnight:   The patient is able to provide adequate history. Additional history was obtained from past medical records. No significant events reported by Nursing.  BP elevated.  Patient complains of nothing specific. Symptoms have improved since yesterday. Associated symptoms include: fatigue. Symptoms are decreasing in severity.     Lab test(s) reviewed: H&H stable    Review of Systems   Constitutional: Negative for fever.   Respiratory: Negative for shortness of breath.      Objective:     Vital Signs (Most Recent):  Temp: 97.6 °F (36.4 °C) (02/17/22 1155)  Pulse: 62 (02/17/22 1155)  Resp: 20 (02/17/22 1155)  BP: (!) 189/76 (02/17/22 1155)  SpO2: 100 % (02/17/22 1155) Vital Signs (24h Range):  Temp:  [97.6 °F (36.4 °C)-98.4 °F (36.9 °C)] 97.6 °F (36.4 °C)  Pulse:  [58-69] 62  Resp:  [16-20] 20  SpO2:  [95 %-100 %] 100 %  BP: ()/(60-76) 189/76     Weight: 105.2 kg (231 lb 14.8 oz)  Body mass index is 41.08 kg/m².    Intake/Output Summary (Last 24 hours) at 2/17/2022 1304  Last data filed at 2/17/2022 0900  Gross per 24 hour   Intake 120 ml   Output 2050 ml   Net -1930 ml      Physical Exam  Constitutional:       General: She is not in acute distress.     Appearance: Normal appearance. She is not diaphoretic.   Eyes:      General: Lids are normal. No scleral icterus.        Right eye: No discharge.         Left eye: No discharge.      Conjunctiva/sclera:  Conjunctivae normal.   Cardiovascular:      Rate and Rhythm: Normal rate.   Pulmonary:      Effort: Pulmonary effort is normal. No tachypnea, accessory muscle usage or respiratory distress.   Abdominal:      General: There is no distension.   Skin:     Coloration: Skin is not cyanotic.   Neurological:      Mental Status: She is alert. She is not disoriented.   Psychiatric:         Attention and Perception: Attention normal.         Mood and Affect: Affect normal.         Behavior: Behavior is cooperative.         Significant Labs:     Recent Labs   Lab 09/05/21  2139 12/21/21  0803 01/03/22  0000   HGBA1C 6.6* 5.3 5.4   :   Recent Labs   Lab 02/16/22  1920 02/17/22  0747 02/17/22  1152   POCTGLUCOSE 128* 181* 186*     Recent Labs   Lab 02/12/22  0404 02/13/22  0514 02/14/22  0355   WBC 6.51 5.58 5.76   HGB 8.1* 9.2* 9.2*   HCT 26.4* 29.7* 30.4*    347 332     Recent Labs   Lab 02/12/22  0404 02/12/22  0404 02/13/22  0514 02/14/22  0355   GRAN 53.1  3.5   < > 43.2  2.4 43.5  2.5   LYMPH 26.3  1.7   < > 33.2  1.9 32.5  1.9   MONO 11.7  0.8   < > 14.9  0.8 14.1  0.8   EOS 0.5  --  0.4 0.5    < > = values in this interval not displayed.     Recent Labs   Lab 02/11/22  0849 02/11/22  0849 02/12/22  0404 02/14/22  0355 02/15/22  0818 02/16/22  0358 02/17/22  0333   *   < > 132*   < > 131* 133* 132*   K 3.9   < > 3.9   < > 4.5 4.6 4.6   CL 92*   < > 97   < > 100 100 100   CO2 26   < > 24   < > 25 22* 21*   BUN 36*   < > 37*   < > 27* 30* 34*   CREATININE 2.3*   < > 2.2*   < > 2.0* 2.1* 2.1*   *   < > 102   < > 151* 138* 143*   CALCIUM 9.3   < > 9.1   < > 9.5 9.3 9.1   ALBUMIN 2.3*  --   --   --  2.0*  --  2.0*   MG  --   --   --   --   --   --  2.0   PHOS  --   --  5.0*  --  4.4  --  4.5    < > = values in this interval not displayed.     Recent Labs   Lab 11/28/21  1950/21  0413 12/06/21  0758 01/03/22  0000 01/23/22  0332 02/09/22  0000 02/11/22  0849 02/11/22  1304   PROCAL  --   --   0.69*  --   --   --  0.12  --    LACTATE 0.8  --   --   --   --   --  1.8 1.4   FERRITIN  --    < >  --  353* 812* 470*  --   --     < > = values in this interval not displayed.     Results for orders placed or performed in visit on 02/09/22   Vitamin D   Result Value Ref Range    Vit D, 25-Hydroxy 58.8 30.0 - 100.0 ng/mL     SARS-CoV2 (COVID-19) Qualitative PCR (no units)   Date Value   02/15/2022 Not Detected   12/28/2021 Not Detected   12/21/2021 Not Detected   09/05/2021 Not Detected     POC Rapid COVID (no units)   Date Value   02/11/2022 Negative   01/21/2022 Negative   01/20/2022 Negative   11/28/2021 Negative   09/28/2021 Negative   09/05/2021 Negative   05/14/2021 Negative       ECG Results          EKG 12-lead (Final result)  Result time 02/12/22 09:41:30    Final result by Interface, Lab In [facility] (02/12/22 09:41:30)                 Narrative:    Test Reason :     Vent. Rate : 057 BPM     Atrial Rate : 059 BPM     P-R Int : 184 ms          QRS Dur : 080 ms      QT Int : 426 ms       P-R-T Axes : -40 -13 -15 degrees     QTc Int : 415 ms    Sinus bradycardia  Voltage criteria for left ventricular hypertrophy  Lateral infarct (cited on or before 11-FEB-2022)  Abnormal ECG  When compared with ECG of 11-FEB-2022 08:39,  No significant change was found  Confirmed by CHAGO GARSIA MDR ([id number]) on 2/12/2022 9:41:21 AM    Referred By: [email or web address]   SELF           Confirmed By:KEVYN GARSIA MD                             EKG 12-lead (Final result)  Result time 02/12/22 09:39:20    Final result by Interface, Lab In [facility] (02/12/22 09:39:20)                 Narrative:    Test Reason : I95.9,    Vent. Rate : 066 BPM     Atrial Rate : 068 BPM     P-R Int : 000 ms          QRS Dur : 072 ms      QT Int : 402 ms       P-R-T Axes : 000 -15 -08 degrees     QTc Int : 422 ms    Technically poor ECG tracing  Sinus rhythm  Voltage criteria for left ventricular hypertrophy  Lateral infarct ,age undetermined  Abnormal ECG  When  compared with ECG of 21-JAN-2022 12:55,  Questionable change in in R wave progression  Lateral infarct is now Present  T wave inversion now evident in Inferior leads  Confirmed by KEVYN GARSIA MD ([id number]) on 2/12/2022 9:39:06 AM    Referred By: ELVA   SELF           Confirmed By:KEVYN GARSIA MD                              Results for orders placed during the hospital encounter of 11/28/21    Echo    Interpretation Summary  · The left ventricle is normal in size with normal systolic function. The estimated ejection fraction is 60%.  · Normal right ventricular size with normal right ventricular systolic function.  · Normal left ventricular diastolic function.  · Mechanically ventilated; cannot use inferior caval vein diameter to estimate central venous pressure.      X-Ray Chest AP Portable  Narrative: EXAMINATION:  XR CHEST AP PORTABLE    CLINICAL HISTORY:  Sepsis;    COMPARISON:  Comparison is made to 01/21/2022 at 13:20.    FINDINGS:  Vascular catheter tip is in the right atrium.  Heart size is within normal limits, and there has been no detrimental change in the appearance of the cardiomediastinal silhouette or pulmonary vascularity since the examination referenced above.  No findings to specifically suggest cardiac decompensation or volume overload.  Lung zones are clear allowing for a poor inspiratory depth level, and are free of significant airspace consolidation or volume loss.  No pleural fluid of any substantial volume is seen on either side, with the minimal blunting of the right costophrenic sulcus seen on the prior exam no longer noted.  No pneumothorax.  Right upper quadrant surgical clips are incidentally noted.  Impression: No significant intrathoracic abnormality or detrimental interval change in the appearance of the chest since 01/21/2022 at 13:20 appreciated, noting that the current examination demonstrates a poorer inspiratory depth level than that prior study.    Electronically signed  by: Nithin Valadez MD  Date:    02/11/2022  Time:    09:45      Labs and Imaging within the last 24 hours listed above were reviewed.       Diet: Diet NPO  Diet NPO  [facility]; Fluid - 1500mL  Significant LDAs:   IV Access Type: Dialysis Access  Urinary Catheter Indication if present: Patient Does Not Have Urinary Catheter  Other Lines/Tubes/Drains: SPC    HIGH RISK CONDITION(S):   Patient has an abrupt change in neurologic status: Weakness     Goals of Care:    Previous admission:  1/21/22  Likely prognosis:  Fair  Code Status: Full Code  Comfort Only: No  Hospice: No  Goals at discharge: remain at home, with physician follow-up    Discharge Planning   FLORENTINO: 2/19/2022     Code Status: Full Code   Is the patient medically ready for discharge?: No    Reason for patient still in hospital (select all that apply): Patient trending condition and Pending disposition  Discharge Plan A: Home Health

## 2022-02-17 NOTE — ANESTHESIA PREPROCEDURE EVALUATION
[facility]  Anesthesia Pre-Operative Evaluation         Patient Name: Cecile Bowen  YOB: 1950  MRN: [id number]    SUBJECTIVE:     Pre-operative evaluation for Procedure(s) (LRB):  CYSTOURETEROSCOPY,WITH HOLMIUM LASER LITHOTRIPSY OF URETERAL CALCULUS (Left)     02/17/2022    Cecile Bowen is a 71 y.o. female w/ a significant PMHx of  Afib, HTN, , hx of ESBL UTI w/ suprapubic catheter, ESRD on HD admitted to observation for hypotension, found to have a UTI.        LDA:        Hemodialysis Catheter right internal jugular (Active)            Hemodialysis Catheter right subclavian (Active)            Peripheral IV - Single Lumen 02/13/22 1615 22 G Posterior;Right Forearm (Active)            Suprapubic Catheter 01/23/22 1530 16 Fr. (Active)         Patient Active Problem List   Diagnosis    Hypothyroidism    Fibromyalgia    Intractable chronic migraine without aura    Vitamin D deficiency disease    Hyperlipidemia associated with type 2 diabetes mellitus    VIJAYA (obstructive sleep apnea)    Hyponatremia    Abnormality of gait and mobility    Osteoarthritis of lumbar spine    Recurrent urinary tract infection    Sideroblastic anemia    Iron deficiency anemia    Primary osteoarthritis involving multiple joints    Urinary retention    Metabolic acidosis    Neurogenic bladder    Major depressive disorder, recurrent episode, moderate    Insomnia    Mixed migraine and muscle contraction headache    Secondary hyperparathyroidism, renal    Uncontrolled type 2 diabetes mellitus with stage 4 chronic kidney disease, with long-term current use of insulin    Type 2 diabetes mellitus, with long-term current use of insulin    Suprapubic catheter    Pulmonary nodule    Osteopenia    Morbid obesity due to excess calories    Chronic daily headache    Long term prescription opiate use    Lumbar spondylosis    Chronic pain    CKD  stage 4 due to type 2 diabetes mellitus    Cervicogenic migraine    Malignant neoplasm of left breast, estrogen receptor positive    Risk for falls    Facet arthritis of lumbar region    Obesity, diabetes, and hypertension syndrome    Requires daily assistance for activities of daily living (ADL) and comfort needs    S/P left mastectomy    Prophylactic use of anastrozole (Arimidex)    Diabetes mellitus type 2 in obese    Type 2 diabetes mellitus with diabetic neuropathy    High priority for 2019 novel coronavirus vaccination    CKD (chronic kidney disease) stage 4, GFR 15-29 ml/min    Nephrotic range proteinuria    Left ureteral stone    Nephrolithiasis    Transaminitis    Chronic obstructive pyelonephritis    Stage 4 chronic kidney disease due to type 2 diabetes mellitus    UTI (urinary tract infection)    Normocytic anemia    ZAK (acute kidney injury)    A-fib    Debility    Encounter for palliative care    Constipation    Dialysis patient    Secondary hypertension    Chronic kidney disease-mineral and bone disorder    Anemia of chronic disease    Urinary tract infection due to ESBL Klebsiella    Pressure injury of right buttock, stage 3    CKD (chronic kidney disease) requiring chronic dialysis    Acute cystitis with hematuria       Review of patient's allergies indicates:  No Known Allergies    Current Inpatient Medications:    anastrozole  1 mg Oral Daily    apixaban  2.5 mg Oral BID    atorvastatin  80 mg Oral Daily    cloNIDine  0.2 mg Oral TID    famotidine  20 mg Oral Daily    insulin aspart U-100  4 Units Subcutaneous TIDWM    insulin detemir U-100  13 Units Subcutaneous Daily    levothyroxine  50 mcg Oral Before breakfast    lisinopriL  40 mg Oral Daily    magnesium oxide  400 mg Oral BID    metoprolol tartrate  25 mg Oral BID    oxybutynin  10 mg Oral Daily    sevelamer carbonate  800 mg Oral TID WM    sulfamethoxazole-trimethoprim 800-160mg  1 tablet Oral  Daily    venlafaxine  150 mg Oral Daily       OBJECTIVE:     Vital Signs Range (Last 24H):  Temp:  [36.5 °C (97.7 °F)-37 °C (98.6 °F)]   Pulse:  [58-69]   Resp:  [16-20]   BP: ()/(60-71)   SpO2:  [95 %-100 %]       Significant Labs:    Lab Results   Component Value Date    WBC 5.76 02/14/2022    HGB 9.2 (L) 02/14/2022    HCT 30.4 (L) 02/14/2022    MCV 96 02/14/2022     02/14/2022         BMP  Lab Results   Component Value Date     (L) 02/17/2022    K 4.6 02/17/2022     02/17/2022    CO2 21 (L) 02/17/2022    BUN 34 (H) 02/17/2022    CREATININE 2.1 (H) 02/17/2022    CALCIUM 9.1 02/17/2022    ANIONGAP 11 02/17/2022    ESTGFRAFRICA 27.1 (A) 02/17/2022    EGFRNONAA 23.5 (A) 02/17/2022     Lab Results   Component Value Date    ALT 9 (L) 02/17/2022    AST 10 02/17/2022    ALKPHOS 106 02/17/2022    BILITOT 0.2 02/17/2022       Lab Results   Component Value Date    INR 1.0 11/29/2021    INR 1.1 09/05/2021    INR 0.9 07/01/2021       Lab Results   Component Value Date    HGBA1C 5.4 01/03/2022       EKG:   Results for orders placed or performed during the hospital encounter of 02/11/22   EKG 12-lead    Collection Time: 02/11/22  9:41 AM    Narrative    Test Reason :     Vent. Rate : 057 BPM     Atrial Rate : 059 BPM     P-R Int : 184 ms          QRS Dur : 080 ms      QT Int : 426 ms       P-R-T Axes : -40 -13 -15 degrees     QTc Int : 415 ms    Sinus bradycardia  Voltage criteria for left ventricular hypertrophy  Lateral infarct (cited on or before 11-FEB-2022)  Abnormal ECG  When compared with ECG of 11-FEB-2022 08:39,  No significant change was found  Confirmed by SUN DANIEL, HOMEYAR ([id number]) on 2/12/2022 9:41:21 AM    Referred By: AAAREFERR   SELF           Confirmed By:KEVYN GARSIA MD       2D ECHO:  TTE:  Results for orders placed or performed during the hospital encounter of 11/28/21   Echo    Narrative    · The left ventricle is normal in size with normal systolic function. The   estimated  ejection fraction is 60%.  · Normal right ventricular size with normal right ventricular systolic   function.  · Normal left ventricular diastolic function.  · Mechanically ventilated; cannot use inferior caval vein diameter to   estimate central venous pressure.          ASSESSMENT/PLAN:       Anesthesia Evaluation    I have reviewed the Patient Summary Reports.    I have reviewed the Nursing Notes.    I have reviewed the Medications.     Review of Systems  Anesthesia Hx:  No problems with previous Anesthesia  History of prior surgery of interest to airway management or planning: Denies Family Hx of Anesthesia complications.    Social:  No Alcohol Use, Non-Smoker    Hematology/Oncology:         -- Anemia: --  Cancer in past history (2020):  Breast left axillary node dissection no lymphedema surgery    EENT/Dental:EENT/Dental Normal   Cardiovascular:   Hypertension Denies MI.  Dysrhythmias atrial fibrillation hyperlipidemia    Pulmonary:   Denies COPD.  Denies Asthma.  Denies Shortness of breath. Sleep Apnea    Renal/:   Chronic Renal Disease (CKD4 ), CRI Was on dialysis started last October,   Stopped last week   Hepatic/GI:  Hepatic/GI Normal Denies Liver Disease.    Musculoskeletal:   Arthritis     Neurological:   Denies CVA. Headaches Denies Seizures.    Endocrine:   Diabetes Hypothyroidism    Psych:   Psychiatric History          Physical Exam  General:  Morbid Obesity    Airway/Jaw/Neck:  Airway Findings: Mouth Opening: Normal Tongue: Normal  General Airway Assessment: Adult  Mallampati: II  TM Distance: Normal, at least 6 cm         Dental:  Dental Findings: Lower Dentures, Upper Dentures, Edentulous   Chest/Lungs:  Chest/Lungs Findings: Clear to auscultation, Normal Respiratory Rate     Heart/Vascular:  Heart Findings: Rate: Normal  Rhythm: Regular Rhythm  Heart murmur: negative       Mental Status:  Mental Status Findings:  Cooperative, Alert and Oriented         Anesthesia Plan  Type of Anesthesia, risks  & benefits discussed:  Anesthesia Type:  general    Patient's Preference:   Plan Factors:          Intra-op Monitoring Plan: standard ASA monitors  Intra-op Monitoring Plan Comments:   Post Op Pain Control Plan: IV/PO Opioids PRN, multimodal analgesia and per primary service following discharge from PACU  Post Op Pain Control Plan Comments:     Induction:   IV  Beta Blocker:  Patient is not currently on a Beta-Blocker (No further documentation required).       Informed Consent: Patient understands risks and agrees with Anesthesia plan.  Questions answered. Anesthesia consent signed with patient.  ASA Score: 3     Day of Surgery Review of History & Physical:  There are no significant changes.          Ready For Surgery From Anesthesia Perspective.

## 2022-02-17 NOTE — PLAN OF CARE
Problem: Infection  Goal: Absence of Infection Signs and Symptoms  Outcome: Ongoing, Progressing     Problem: UTI (Urinary Tract Infection)  Goal: Improved Infection Symptoms  Outcome: Ongoing, Progressing  Patient Aox4, remains on RA. SP catheter in place, 1L output during the shift. C/o headache & back pain at the beginning of shift; imitrex given x1 & robaxin given x1. Glucose WNL, no insulin required. Wound care performed to sacrum this morning. Norco & robaxin given this morning for back pain. Awaiting uteroscopy. All needs met overnight.

## 2022-02-17 NOTE — PROGRESS NOTES
[facility] - Telemetry Stepdown ([place] [address])  Urology  Progress Note    Patient Name: Cecile Bowen  MRN: [id number]  Admission Date: 2/11/2022  Hospital Length of Stay: 3 days  Code Status: Full Code   Attending Provider: Autumn Hoover MD   Primary Care Physician: Kailey Navarro MD    Subjective:     HPI:  Cecile Bowen is a 71 y.o. female with PMHx significant for HTN, HLD, hx of ESBL UTI, ESRD on HD admitted to observation for hypotension, found to have a UTI. Patient was at her dialysis clinic today when she was found to have a BP of 76/43 and advised to come to the ED for evaluation. Reports her SBPs usually run in the 120s. Endorses cloudy urine in her catheter bag for the past few days, and reports it was last changed about a week ago. Denies lightheadedness, dizziness, SOB, fatigue, weakness, HA, CP, cough, abdominal pain, n/v. Patient was recently hospitalized for a ESBL UTI for which she completed her course of meropenem. Of note, patient also has a right subclavian dialysis access that was also changed about a week ago.      In the ED, BP 94/24 improved to 102/57. Remaining VSS. WBC 8.06. Lactic 1.8. Procal 0.12. Hgb 8.5 (~BL). . Cr 2.3 (~BL). Glucose 224. UA with 1+ leuks, 28 RBCs, >100 WBCs. CXR with no acute processes. Given Vanc 2g x 1.     Interval History: AF. HDS. Urine output adequate. OR tomorrow    Review of Systems  Objective:     Temp:  [97.7 °F (36.5 °C)-98.6 °F (37 °C)] 97.7 °F (36.5 °C)  Pulse:  [58-80] 58  Resp:  [16-20] 18  SpO2:  [96 %-98 %] 96 %  BP: ()/(60-82) 143/65     Body mass index is 41.08 kg/m².           Drains     Drain                 Hemodialysis Catheter right internal jugular -- days         Hemodialysis Catheter right subclavian -- days         Suprapubic Catheter 01/23/22 1530 16 Fr. 24 days                Physical Exam  Constitutional:       Appearance: She is obese.   HENT:      Head: Normocephalic.   Eyes:      Pupils: Pupils are equal,  round, and reactive to light.   Cardiovascular:      Rate and Rhythm: Normal rate.   Pulmonary:      Effort: Pulmonary effort is normal.   Abdominal:      Palpations: Abdomen is soft.   Genitourinary:     Comments: [place]  Musculoskeletal:         General: Normal range of motion.      Cervical back: Normal range of motion.   Neurological:      General: No focal deficit present.      Mental Status: She is alert.   Psychiatric:         Mood and Affect: Mood normal.         Significant Labs:    BMP:  Recent Labs   Lab 02/15/22  0818 02/16/22  0358 02/17/22  0333   * 133* 132*   K 4.5 4.6 4.6    100 100   CO2 25 22* 21*   BUN 27* 30* 34*   CREATININE 2.0* 2.1* 2.1*   CALCIUM 9.5 9.3 9.1       CBC:   Recent Labs   Lab 02/12/22  0404 02/13/22  0514 02/14/22  0355   WBC 6.51 5.58 5.76   HGB 8.1* 9.2* 9.2*   HCT 26.4* 29.7* 30.4*    347 332       All pertinent labs results from the past 24 hours have been reviewed.    Significant Imaging:  All pertinent imaging results/findings from the past 24 hours have been reviewed.                  Assessment/Plan:     Left ureteral stone  -OR tomorrow  -NPO midnight  -Covid negative 2/15/22  -Need consent signed        VTE Risk Mitigation (From admission, onward)         Ordered     heparin (porcine) injection 1,000 Units  As needed (PRN)         02/12/22 1104     apixaban tablet 2.5 mg  2 times daily         02/11/22 1342     IP VTE HIGH RISK PATIENT  Once         02/11/22 1132     Place sequential compression device  Until discontinued         02/11/22 1132                Ty Rivera MD  Urology  [facility] - Telemetry Stepdown ([place]

## 2022-02-17 NOTE — CONSULTS
Thank you for your consult to [facility]. We have reviewed the patient chart. This patient does meet criteria for [facility] service at this time. Will assume care on 02/17/22 at 7AM.    Dorothy Kraus MD

## 2022-02-17 NOTE — NURSING
No acute events during shift. VSS. Pain and headache managed per MAR. Fall and safety precautions maintained. POC discussed with patient Will continue to monitor.

## 2022-02-17 NOTE — PLAN OF CARE
REFERRING PHYSICIAN: Robby Byrnes MD    DATE:  02/16/2022    PREOPERATIVE DIAGNOSIS:  Right ureteral obstruction.    POSTOPERATIVE DIAGNOSIS:  Partial right distal ureteral obstruction.    PROCEDURE:  Cystoscopy, right retrograde pyelogram, ureteroscopy, and placement of right ureteral stent.    ANESTHESIA:  General.    ESTIMATED BLOOD LOSS:  Zero.     No complications.    HISTORY:  This is an 86-year-old woman who underwent a vaginal vault prolapse repair and anterior repair including sacrospinous fixation procedure in December of 2021.  At the time of surgery, there was some difficulty encountered in passing a whistle-tip catheter up the right ureter.  For this reason, a stent was placed intraoperatively.  The stent was removed in the office and followup ultrasonography indicate persistent right hydronephrosis.  For this reason, she is scheduled for the above procedure.  The potential risks of bleeding, infection, ureteral pain, and injury were reviewed.  The need for postoperative stent management was discussed and the intention of leaving the stent in for several months was reviewed with the patient with the assistance of a Polish .  During the preop evaluation, it was also discovered that the patient had a prior history during childhood of severe right flank pain, possibly due to ureteral obstruction, but no details on this history are provided.     The patient was taken to the operating room and placed in a lithotomy position.  The perineal region was prepped and draped in a sterile fashion.  Cystoscopy was performed.  There were no tumors, stones, or erythematous areas.  The vaginal vault remained well supported from the recent sacrospinous fixation.  There was no evidence for recurrence of the cystocele.  However, a grade 3 rectocele was noted.  The patient has not been symptomatic from this rectocele, and it is being observed.  The right ureteral orifice was cannulated with a guidewire.   Pt has been drinking water all night. Last sip at 0930. francisca Merrill notified. Pt instructed to no longer drink water.   The wire passed up into the right kidney without difficulty.  An open-ended catheter was passed over the wire.  The wire was removed.  10 cc of contrast was used to opacify the right renal pelvis, which was moderately dilated with evidence for moderate hydronephrosis.  Contrast was injected in the ureter as the open-ended catheter was withdrawn.  There was significant dilation of the proximal, mid, and distal portions of the ureter.  At approximately 5 cm from the ureterovesical junction, there was narrowing of the ureter with no evidence for filling defect.  A wire was advanced up the ureter and a ureteroscope was advanced alongside the wire.  The scope passed easily through the intertrigonal portion of the ureter.  At approximately 5-10 cm, there was a slight banding of the ureter, but no evidence for tumor, stone, or suture material.  The ureter was patent and the scope could be advanced without significant difficulty beyond this narrowed area.  Due to the moderate degree of obstruction caused by this segmental narrowing, it was elected to proceed with stent placement.  The wire was left in place and back loaded through the cystoscope.  A 24 cm 6-Emirati double pigtail ureteral stent was advanced.  The wire was removed.  Correct positioning was confirmed with fluoroscopy.  The bladder was then emptied, and the patient was transferred to recovery in stable condition.        Dictated By:  Robby Byrnes MD        D:  02/16/2022 16:15:15  T:  02/17/2022 04:13:48  LBERON/jeffrey  Job:  [id number]      cc:  Robby Byrnes MD

## 2022-02-18 ENCOUNTER — ANESTHESIA (OUTPATIENT)
Dept: SURGERY | Facility: HOSPITAL | Age: 72
DRG: 659 | End: 2022-02-18
Payer: MEDICARE

## 2022-02-18 LAB
ALBUMIN SERPL BCP-MCNC: 1.8 G/DL (ref 3.5–5.2)
ALP SERPL-CCNC: 96 U/L (ref 55–135)
ALT SERPL W/O P-5'-P-CCNC: 6 U/L (ref 10–44)
ANION GAP SERPL CALC-SCNC: 8 MMOL/L (ref 8–16)
ANION GAP SERPL CALC-SCNC: 8 MMOL/L (ref 8–16)
AST SERPL-CCNC: 19 U/L (ref 10–40)
BILIRUB SERPL-MCNC: 0.2 MG/DL (ref 0.1–1)
BUN SERPL-MCNC: 31 MG/DL (ref 8–23)
BUN SERPL-MCNC: 35 MG/DL (ref 8–23)
CALCIUM SERPL-MCNC: 8.5 MG/DL (ref 8.7–10.5)
CALCIUM SERPL-MCNC: 8.7 MG/DL (ref 8.7–10.5)
CHLORIDE SERPL-SCNC: 102 MMOL/L (ref 95–110)
CHLORIDE SERPL-SCNC: 104 MMOL/L (ref 95–110)
CO2 SERPL-SCNC: 19 MMOL/L (ref 23–29)
CO2 SERPL-SCNC: 20 MMOL/L (ref 23–29)
CREAT SERPL-MCNC: 2.1 MG/DL (ref 0.5–1.4)
CREAT SERPL-MCNC: 2.1 MG/DL (ref 0.5–1.4)
EST. GFR  (AFRICAN AMERICAN): 27.1 ML/MIN/1.73 M^2
EST. GFR  (AFRICAN AMERICAN): 27.1 ML/MIN/1.73 M^2
EST. GFR  (NON AFRICAN AMERICAN): 23.5 ML/MIN/1.73 M^2
EST. GFR  (NON AFRICAN AMERICAN): 23.5 ML/MIN/1.73 M^2
GLUCOSE SERPL-MCNC: 131 MG/DL (ref 70–110)
GLUCOSE SERPL-MCNC: 197 MG/DL (ref 70–110)
POCT GLUCOSE: 146 MG/DL (ref 70–110)
POCT GLUCOSE: 158 MG/DL (ref 70–110)
POCT GLUCOSE: 181 MG/DL (ref 70–110)
POCT GLUCOSE: 215 MG/DL (ref 70–110)
POTASSIUM SERPL-SCNC: 5.3 MMOL/L (ref 3.5–5.1)
POTASSIUM SERPL-SCNC: 5.3 MMOL/L (ref 3.5–5.1)
PROT SERPL-MCNC: 5.5 G/DL (ref 6–8.4)
SODIUM SERPL-SCNC: 130 MMOL/L (ref 136–145)
SODIUM SERPL-SCNC: 131 MMOL/L (ref 136–145)

## 2022-02-18 PROCEDURE — 51705 CHANGE OF BLADDER TUBE: CPT | Mod: 51,HCNC,, | Performed by: UROLOGY

## 2022-02-18 PROCEDURE — 25000003 PHARM REV CODE 250: Mod: HCNC | Performed by: INTERNAL MEDICINE

## 2022-02-18 PROCEDURE — 63600175 PHARM REV CODE 636 W HCPCS: Mod: HCNC | Performed by: STUDENT IN AN ORGANIZED HEALTH CARE EDUCATION/TRAINING PROGRAM

## 2022-02-18 PROCEDURE — 74420 UROGRAPHY RTRGR +-KUB: CPT | Mod: 26,HCNC,, | Performed by: UROLOGY

## 2022-02-18 PROCEDURE — 37000008 HC ANESTHESIA 1ST 15 MINUTES: Mod: HCNC | Performed by: UROLOGY

## 2022-02-18 PROCEDURE — 11000001 HC ACUTE MED/SURG PRIVATE ROOM: Mod: HCNC

## 2022-02-18 PROCEDURE — 99233 PR SUBSEQUENT HOSPITAL CARE,LEVL III: ICD-10-PCS | Mod: HCNC,95,, | Performed by: INTERNAL MEDICINE

## 2022-02-18 PROCEDURE — D9220A PRA ANESTHESIA: Mod: HCNC,ANES,, | Performed by: ANESTHESIOLOGY

## 2022-02-18 PROCEDURE — D9220A PRA ANESTHESIA: ICD-10-PCS | Mod: HCNC,ANES,, | Performed by: ANESTHESIOLOGY

## 2022-02-18 PROCEDURE — 74420 PR  X-RAY RETROGRADE PYELOGRAM: ICD-10-PCS | Mod: 26,HCNC,, | Performed by: UROLOGY

## 2022-02-18 PROCEDURE — 94761 N-INVAS EAR/PLS OXIMETRY MLT: CPT | Mod: HCNC

## 2022-02-18 PROCEDURE — 25000003 PHARM REV CODE 250: Mod: HCNC | Performed by: STUDENT IN AN ORGANIZED HEALTH CARE EDUCATION/TRAINING PROGRAM

## 2022-02-18 PROCEDURE — D9220A PRA ANESTHESIA: ICD-10-PCS | Mod: HCNC,CRNA,, | Performed by: NURSE ANESTHETIST, CERTIFIED REGISTERED

## 2022-02-18 PROCEDURE — D9220A PRA ANESTHESIA: Mod: HCNC,CRNA,, | Performed by: NURSE ANESTHETIST, CERTIFIED REGISTERED

## 2022-02-18 PROCEDURE — C1894 INTRO/SHEATH, NON-LASER: HCPCS | Mod: HCNC | Performed by: UROLOGY

## 2022-02-18 PROCEDURE — 52354 PR CYSTO/URETERO/PYELOSC,BX &/OR FULG LESN: ICD-10-PCS | Mod: HCNC,LT,, | Performed by: UROLOGY

## 2022-02-18 PROCEDURE — C1769 GUIDE WIRE: HCPCS | Mod: HCNC | Performed by: UROLOGY

## 2022-02-18 PROCEDURE — 25000003 PHARM REV CODE 250: Mod: HCNC | Performed by: PHYSICIAN ASSISTANT

## 2022-02-18 PROCEDURE — 88305 TISSUE EXAM BY PATHOLOGIST: CPT | Mod: 26,HCNC,, | Performed by: STUDENT IN AN ORGANIZED HEALTH CARE EDUCATION/TRAINING PROGRAM

## 2022-02-18 PROCEDURE — 99233 SBSQ HOSP IP/OBS HIGH 50: CPT | Mod: HCNC,95,, | Performed by: INTERNAL MEDICINE

## 2022-02-18 PROCEDURE — 36000707: Mod: HCNC | Performed by: UROLOGY

## 2022-02-18 PROCEDURE — 36000706: Mod: HCNC | Performed by: UROLOGY

## 2022-02-18 PROCEDURE — 82962 GLUCOSE BLOOD TEST: CPT | Mod: HCNC | Performed by: UROLOGY

## 2022-02-18 PROCEDURE — 88305 TISSUE EXAM BY PATHOLOGIST: ICD-10-PCS | Mod: 26,HCNC,, | Performed by: STUDENT IN AN ORGANIZED HEALTH CARE EDUCATION/TRAINING PROGRAM

## 2022-02-18 PROCEDURE — 51705 PR CHANGE OF BLADDER TUBE,SIMPLE: ICD-10-PCS | Mod: 51,HCNC,, | Performed by: UROLOGY

## 2022-02-18 PROCEDURE — 52354 CYSTOURETERO W/BIOPSY: CPT | Mod: HCNC,LT,, | Performed by: UROLOGY

## 2022-02-18 PROCEDURE — C9399 UNCLASSIFIED DRUGS OR BIOLOG: HCPCS | Mod: HCNC | Performed by: PHYSICIAN ASSISTANT

## 2022-02-18 PROCEDURE — 37000009 HC ANESTHESIA EA ADD 15 MINS: Mod: HCNC | Performed by: UROLOGY

## 2022-02-18 PROCEDURE — 52332 PR CYSTOSCOPY,INSERT URETERAL STENT: ICD-10-PCS | Mod: 51,HCNC,LT, | Performed by: UROLOGY

## 2022-02-18 PROCEDURE — C2617 STENT, NON-COR, TEM W/O DEL: HCPCS | Mod: HCNC | Performed by: UROLOGY

## 2022-02-18 PROCEDURE — 88305 TISSUE EXAM BY PATHOLOGIST: CPT | Mod: HCNC | Performed by: STUDENT IN AN ORGANIZED HEALTH CARE EDUCATION/TRAINING PROGRAM

## 2022-02-18 PROCEDURE — 71000015 HC POSTOP RECOV 1ST HR: Mod: HCNC | Performed by: UROLOGY

## 2022-02-18 PROCEDURE — 36415 COLL VENOUS BLD VENIPUNCTURE: CPT | Mod: HCNC | Performed by: STUDENT IN AN ORGANIZED HEALTH CARE EDUCATION/TRAINING PROGRAM

## 2022-02-18 PROCEDURE — 52332 CYSTOSCOPY AND TREATMENT: CPT | Mod: 51,HCNC,LT, | Performed by: UROLOGY

## 2022-02-18 PROCEDURE — 80048 BASIC METABOLIC PNL TOTAL CA: CPT | Mod: HCNC | Performed by: STUDENT IN AN ORGANIZED HEALTH CARE EDUCATION/TRAINING PROGRAM

## 2022-02-18 PROCEDURE — 71000044 HC DOSC ROUTINE RECOVERY FIRST HOUR: Mod: HCNC | Performed by: UROLOGY

## 2022-02-18 PROCEDURE — C1758 CATHETER, URETERAL: HCPCS | Mod: HCNC | Performed by: UROLOGY

## 2022-02-18 PROCEDURE — 27201423 OPTIME MED/SURG SUP & DEVICES STERILE SUPPLY: Mod: HCNC | Performed by: UROLOGY

## 2022-02-18 PROCEDURE — 25000003 PHARM REV CODE 250: Mod: HCNC | Performed by: NURSE ANESTHETIST, CERTIFIED REGISTERED

## 2022-02-18 PROCEDURE — 80053 COMPREHEN METABOLIC PANEL: CPT | Mod: HCNC | Performed by: STUDENT IN AN ORGANIZED HEALTH CARE EDUCATION/TRAINING PROGRAM

## 2022-02-18 PROCEDURE — 63600175 PHARM REV CODE 636 W HCPCS: Mod: HCNC | Performed by: NURSE ANESTHETIST, CERTIFIED REGISTERED

## 2022-02-18 DEVICE — STENT URETERAL UNIV 6FR 24CM: Type: IMPLANTABLE DEVICE | Site: URETER | Status: FUNCTIONAL

## 2022-02-18 RX ORDER — FENTANYL CITRATE 50 UG/ML
INJECTION, SOLUTION INTRAMUSCULAR; INTRAVENOUS
Status: DISCONTINUED | OUTPATIENT
Start: 2022-02-18 | End: 2022-02-18

## 2022-02-18 RX ORDER — SODIUM CHLORIDE 9 MG/ML
INJECTION, SOLUTION INTRAVENOUS CONTINUOUS
Status: ACTIVE | OUTPATIENT
Start: 2022-02-18 | End: 2022-02-19

## 2022-02-18 RX ORDER — ACETAMINOPHEN 10 MG/ML
INJECTION, SOLUTION INTRAVENOUS
Status: DISCONTINUED | OUTPATIENT
Start: 2022-02-18 | End: 2022-02-18

## 2022-02-18 RX ORDER — PROPOFOL 10 MG/ML
VIAL (ML) INTRAVENOUS
Status: DISCONTINUED | OUTPATIENT
Start: 2022-02-18 | End: 2022-02-18

## 2022-02-18 RX ORDER — DEXAMETHASONE SODIUM PHOSPHATE 4 MG/ML
INJECTION, SOLUTION INTRA-ARTICULAR; INTRALESIONAL; INTRAMUSCULAR; INTRAVENOUS; SOFT TISSUE
Status: DISCONTINUED | OUTPATIENT
Start: 2022-02-18 | End: 2022-02-18

## 2022-02-18 RX ORDER — MEROPENEM AND SODIUM CHLORIDE 1 G/50ML
1 INJECTION, SOLUTION INTRAVENOUS
Status: DISCONTINUED | OUTPATIENT
Start: 2022-02-18 | End: 2022-02-18

## 2022-02-18 RX ORDER — PHENYLEPHRINE HYDROCHLORIDE 10 MG/ML
INJECTION INTRAVENOUS
Status: DISCONTINUED | OUTPATIENT
Start: 2022-02-18 | End: 2022-02-18

## 2022-02-18 RX ORDER — MEROPENEM AND SODIUM CHLORIDE 1 G/50ML
1 INJECTION, SOLUTION INTRAVENOUS ONCE
Status: COMPLETED | OUTPATIENT
Start: 2022-02-18 | End: 2022-02-19

## 2022-02-18 RX ORDER — NEOSTIGMINE METHYLSULFATE 0.5 MG/ML
INJECTION, SOLUTION INTRAVENOUS
Status: DISCONTINUED | OUTPATIENT
Start: 2022-02-18 | End: 2022-02-18

## 2022-02-18 RX ORDER — LANOLIN ALCOHOL/MO/W.PET/CERES
400 CREAM (GRAM) TOPICAL DAILY
Status: DISCONTINUED | OUTPATIENT
Start: 2022-02-19 | End: 2022-02-25 | Stop reason: HOSPADM

## 2022-02-18 RX ORDER — HYDROMORPHONE HYDROCHLORIDE 1 MG/ML
0.2 INJECTION, SOLUTION INTRAMUSCULAR; INTRAVENOUS; SUBCUTANEOUS EVERY 5 MIN PRN
Status: DISCONTINUED | OUTPATIENT
Start: 2022-02-18 | End: 2022-02-18 | Stop reason: HOSPADM

## 2022-02-18 RX ORDER — SODIUM CHLORIDE 0.9 % (FLUSH) 0.9 %
3 SYRINGE (ML) INJECTION
Status: DISCONTINUED | OUTPATIENT
Start: 2022-02-18 | End: 2022-02-25 | Stop reason: HOSPADM

## 2022-02-18 RX ORDER — MIDAZOLAM HYDROCHLORIDE 1 MG/ML
INJECTION, SOLUTION INTRAMUSCULAR; INTRAVENOUS
Status: DISCONTINUED | OUTPATIENT
Start: 2022-02-18 | End: 2022-02-18

## 2022-02-18 RX ORDER — CISATRACURIUM BESYLATE 10 MG/ML
INJECTION, SOLUTION INTRAVENOUS
Status: DISCONTINUED | OUTPATIENT
Start: 2022-02-18 | End: 2022-02-18

## 2022-02-18 RX ORDER — LIDOCAINE HYDROCHLORIDE 20 MG/ML
INJECTION INTRAVENOUS
Status: DISCONTINUED | OUTPATIENT
Start: 2022-02-18 | End: 2022-02-18

## 2022-02-18 RX ORDER — ONDANSETRON 2 MG/ML
INJECTION INTRAMUSCULAR; INTRAVENOUS
Status: DISCONTINUED | OUTPATIENT
Start: 2022-02-18 | End: 2022-02-18

## 2022-02-18 RX ADMIN — SEVELAMER CARBONATE 800 MG: 800 TABLET, FILM COATED ORAL at 04:02

## 2022-02-18 RX ADMIN — INSULIN ASPART 4 UNITS: 100 INJECTION, SOLUTION INTRAVENOUS; SUBCUTANEOUS at 04:02

## 2022-02-18 RX ADMIN — LEVOTHYROXINE SODIUM 50 MCG: 50 TABLET ORAL at 05:02

## 2022-02-18 RX ADMIN — VENLAFAXINE HYDROCHLORIDE 150 MG: 150 CAPSULE, EXTENDED RELEASE ORAL at 08:02

## 2022-02-18 RX ADMIN — ACETAMINOPHEN 650 MG: 10 INJECTION, SOLUTION INTRAVENOUS at 11:02

## 2022-02-18 RX ADMIN — PROPOFOL 60 MG: 10 INJECTION, EMULSION INTRAVENOUS at 11:02

## 2022-02-18 RX ADMIN — SODIUM CHLORIDE: 0.9 INJECTION, SOLUTION INTRAVENOUS at 10:02

## 2022-02-18 RX ADMIN — OXYBUTYNIN CHLORIDE 10 MG: 10 TABLET, EXTENDED RELEASE ORAL at 08:02

## 2022-02-18 RX ADMIN — HYDROCODONE BITARTRATE AND ACETAMINOPHEN 1 TABLET: 10; 325 TABLET ORAL at 09:02

## 2022-02-18 RX ADMIN — GLYCOPYRROLATE 0.4 MG: 0.2 INJECTION, SOLUTION INTRAMUSCULAR; INTRAVITREAL at 12:02

## 2022-02-18 RX ADMIN — PHENYLEPHRINE HYDROCHLORIDE 150 MCG: 10 INJECTION INTRAVENOUS at 11:02

## 2022-02-18 RX ADMIN — FENTANYL CITRATE 25 MCG: 50 INJECTION, SOLUTION INTRAMUSCULAR; INTRAVENOUS at 11:02

## 2022-02-18 RX ADMIN — MIDAZOLAM HYDROCHLORIDE 0.5 MG: 1 INJECTION, SOLUTION INTRAMUSCULAR; INTRAVENOUS at 11:02

## 2022-02-18 RX ADMIN — PHENYLEPHRINE HYDROCHLORIDE 100 MCG: 10 INJECTION INTRAVENOUS at 11:02

## 2022-02-18 RX ADMIN — METHOCARBAMOL 750 MG: 750 TABLET ORAL at 09:02

## 2022-02-18 RX ADMIN — SODIUM CHLORIDE: 0.9 INJECTION, SOLUTION INTRAVENOUS at 04:02

## 2022-02-18 RX ADMIN — LIDOCAINE HYDROCHLORIDE 100 MG: 20 INJECTION, SOLUTION INTRAVENOUS at 11:02

## 2022-02-18 RX ADMIN — CLONIDINE HYDROCHLORIDE 0.2 MG: 0.2 TABLET ORAL at 02:02

## 2022-02-18 RX ADMIN — METOPROLOL TARTRATE 25 MG: 25 TABLET, FILM COATED ORAL at 09:02

## 2022-02-18 RX ADMIN — INSULIN ASPART 2 UNITS: 100 INJECTION, SOLUTION INTRAVENOUS; SUBCUTANEOUS at 04:02

## 2022-02-18 RX ADMIN — CISATRACURIUM BESYLATE 10 MG: 10 INJECTION INTRAVENOUS at 11:02

## 2022-02-18 RX ADMIN — SODIUM ZIRCONIUM CYCLOSILICATE 5 G: 5 POWDER, FOR SUSPENSION ORAL at 06:02

## 2022-02-18 RX ADMIN — INSULIN ASPART 4 UNITS: 100 INJECTION, SOLUTION INTRAVENOUS; SUBCUTANEOUS at 01:02

## 2022-02-18 RX ADMIN — LISINOPRIL 40 MG: 20 TABLET ORAL at 08:02

## 2022-02-18 RX ADMIN — APIXABAN 2.5 MG: 2.5 TABLET, FILM COATED ORAL at 09:02

## 2022-02-18 RX ADMIN — ONDANSETRON 4 MG: 2 INJECTION, SOLUTION INTRAMUSCULAR; INTRAVENOUS at 11:02

## 2022-02-18 RX ADMIN — MEROPENEM AND SODIUM CHLORIDE 1 G: 1 INJECTION, SOLUTION INTRAVENOUS at 11:02

## 2022-02-18 RX ADMIN — DEXAMETHASONE SODIUM PHOSPHATE 4 MG: 4 INJECTION, SOLUTION INTRAMUSCULAR; INTRAVENOUS at 11:02

## 2022-02-18 RX ADMIN — SULFAMETHOXAZOLE AND TRIMETHOPRIM 1 TABLET: 800; 160 TABLET ORAL at 08:02

## 2022-02-18 RX ADMIN — INSULIN DETEMIR 13 UNITS: 100 INJECTION, SOLUTION SUBCUTANEOUS at 01:02

## 2022-02-18 RX ADMIN — PROPOFOL 20 MG: 10 INJECTION, EMULSION INTRAVENOUS at 11:02

## 2022-02-18 RX ADMIN — PHENYLEPHRINE HYDROCHLORIDE 100 MCG: 10 INJECTION INTRAVENOUS at 12:02

## 2022-02-18 RX ADMIN — FENTANYL CITRATE 50 MCG: 50 INJECTION, SOLUTION INTRAMUSCULAR; INTRAVENOUS at 11:02

## 2022-02-18 RX ADMIN — SUMATRIPTAN SUCCINATE 100 MG: 50 TABLET ORAL at 02:02

## 2022-02-18 RX ADMIN — ANASTROZOLE 1 MG: 1 TABLET, COATED ORAL at 08:02

## 2022-02-18 RX ADMIN — NEOSTIGMINE METHYLSULFATE 4 MG: 0.5 INJECTION INTRAVENOUS at 12:02

## 2022-02-18 RX ADMIN — GLYCOPYRROLATE 0.1 MG: 0.2 INJECTION, SOLUTION INTRAMUSCULAR; INTRAVITREAL at 11:02

## 2022-02-18 RX ADMIN — SEVELAMER CARBONATE 800 MG: 800 TABLET, FILM COATED ORAL at 08:02

## 2022-02-18 RX ADMIN — FAMOTIDINE 20 MG: 20 TABLET ORAL at 08:02

## 2022-02-18 RX ADMIN — HYDROCODONE BITARTRATE AND ACETAMINOPHEN 1 TABLET: 10; 325 TABLET ORAL at 08:02

## 2022-02-18 RX ADMIN — CLONIDINE HYDROCHLORIDE 0.2 MG: 0.2 TABLET ORAL at 09:02

## 2022-02-18 RX ADMIN — INSULIN DETEMIR 13 UNITS: 100 INJECTION, SOLUTION SUBCUTANEOUS at 08:02

## 2022-02-18 RX ADMIN — PROPOFOL 50 MG: 10 INJECTION, EMULSION INTRAVENOUS at 11:02

## 2022-02-18 RX ADMIN — METOPROLOL TARTRATE 25 MG: 25 TABLET, FILM COATED ORAL at 08:02

## 2022-02-18 RX ADMIN — CLONIDINE HYDROCHLORIDE 0.2 MG: 0.2 TABLET ORAL at 08:02

## 2022-02-18 RX ADMIN — TRAZODONE HYDROCHLORIDE 100 MG: 100 TABLET ORAL at 09:02

## 2022-02-18 RX ADMIN — CISATRACURIUM BESYLATE 2 MG: 10 INJECTION INTRAVENOUS at 12:02

## 2022-02-18 RX ADMIN — ATORVASTATIN CALCIUM 80 MG: 40 TABLET, FILM COATED ORAL at 08:02

## 2022-02-18 RX ADMIN — SEVELAMER CARBONATE 800 MG: 800 TABLET, FILM COATED ORAL at 02:02

## 2022-02-18 NOTE — SUBJECTIVE & OBJECTIVE
Telemedicine  This service was provided by Virtual Visit.    Patient was transferred to [facility] on:  2/17/22     Chief Complaint   Patient presents with    Hypotension     Arrived via [facility] from dialysis center with c/o hypotension SBP 80s, sent for possible urosepsis, recent UTI diagnosis, did not receive dialysis today, last dialyzed wednesday       The patient location is: [address]*   Admitted 2/11/2022  8:18 AM  Present with the patient at the time of the telemed/virtual assessment: Telepresenter    Interval History / Events Overnight:   The patient is able to provide adequate history. Additional history was obtained from past medical records. No significant events reported by Nursing. Had Cysto today.  Patient complains of nothing specific. Symptoms have improved since yesterday. Associated symptoms include: fatigue. Symptoms are decreasing in severity.     Lab test(s) reviewed: Hyperkalemia  Discussed case with Nephrology (another health care provider)    Review of Systems   Constitutional: Negative for fever.   Respiratory: Negative for shortness of breath.      Objective:     Vital Signs (Most Recent):  Temp: 97.5 °F (36.4 °C) (02/18/22 1300)  Pulse: 63 (02/18/22 1300)  Resp: 16 (02/18/22 1300)  BP: 124/60 (02/18/22 1300)  SpO2: 97 % (02/18/22 1300) Vital Signs (24h Range):  Temp:  [97 °F (36.1 °C)-98.9 °F (37.2 °C)] 97.5 °F (36.4 °C)  Pulse:  [63-80] 63  Resp:  [16-20] 16  SpO2:  [92 %-100 %] 97 %  BP: (117-189)/(60-87) 124/60     Weight: 104.8 kg (231 lb)  Body mass index is 40.92 kg/m².    Intake/Output Summary (Last 24 hours) at 2/18/2022 1340  Last data filed at 2/18/2022 1208  Gross per 24 hour   Intake 405 ml   Output 1501 ml   Net -1096 ml      Physical Exam  Constitutional:       General: She is not in acute distress.     Appearance: Normal appearance. She is not diaphoretic.   Eyes:      General: Lids are normal. No scleral icterus.        Right eye: No  discharge.         Left eye: No discharge.      Conjunctiva/sclera: Conjunctivae normal.   Cardiovascular:      Rate and Rhythm: Normal rate.   Pulmonary:      Effort: Pulmonary effort is normal. No tachypnea, accessory muscle usage or respiratory distress.   Abdominal:      General: There is no distension.   Skin:     Coloration: Skin is not cyanotic.   Neurological:      Mental Status: She is alert. She is not disoriented.   Psychiatric:         Attention and Perception: Attention normal.         Mood and Affect: Affect normal.         Behavior: Behavior is cooperative.         Significant Labs:     Recent Labs   Lab 09/05/21  2139 12/21/21  0803 01/03/22  0000   HGBA1C 6.6* 5.3 5.4   :   Recent Labs   Lab 02/17/22  1911 02/18/22  0806 02/18/22  1236   POCTGLUCOSE 188* 146* 158*     Recent Labs   Lab 02/12/22  0404 02/13/22  0514 02/14/22  0355   WBC 6.51 5.58 5.76   HGB 8.1* 9.2* 9.2*   HCT 26.4* 29.7* 30.4*    347 332     Recent Labs   Lab 02/12/22  0404 02/12/22  0404 02/13/22  0514 02/14/22  0355   GRAN 53.1  3.5   < > 43.2  2.4 43.5  2.5   LYMPH 26.3  1.7   < > 33.2  1.9 32.5  1.9   MONO 11.7  0.8   < > 14.9  0.8 14.1  0.8   EOS 0.5  --  0.4 0.5    < > = values in this interval not displayed.     Recent Labs   Lab 02/12/22  0404 02/14/22  0355 02/15/22  0818 02/15/22  0818 02/16/22  0358 02/17/22  0333 02/18/22  0231   *   < > 131*   < > 133* 132* 130*   K 3.9   < > 4.5   < > 4.6 4.6 5.3*   CL 97   < > 100   < > 100 100 102   CO2 24   < > 25   < > 22* 21* 20*   BUN 37*   < > 27*   < > 30* 34* 35*   CREATININE 2.2*   < > 2.0*   < > 2.1* 2.1* 2.1*      < > 151*   < > 138* 143* 131*   CALCIUM 9.1   < > 9.5   < > 9.3 9.1 8.7   ALBUMIN  --   --  2.0*  --   --  2.0* 1.8*   MG  --   --   --   --   --  2.0  --    PHOS 5.0*  --  4.4  --   --  4.5  --     < > = values in this interval not displayed.     Recent Labs   Lab 11/28/21  1952/21  0413 12/06/21  0758 01/03/22  0000  01/23/22  0332 02/09/22  0000 02/11/22  0849 02/11/22  1304   PROCAL  --   --  0.69*  --   --   --  0.12  --    LACTATE 0.8  --   --   --   --   --  1.8 1.4   FERRITIN  --    < >  --  353* 812* 470*  --   --     < > = values in this interval not displayed.     Results for orders placed or performed in visit on 02/09/22   Vitamin D   Result Value Ref Range    Vit D, 25-Hydroxy 58.8 30.0 - 100.0 ng/mL     SARS-CoV2 (COVID-19) Qualitative PCR (no units)   Date Value   02/15/2022 Not Detected   12/28/2021 Not Detected   12/21/2021 Not Detected   09/05/2021 Not Detected     POC Rapid COVID (no units)   Date Value   02/11/2022 Negative   01/21/2022 Negative   01/20/2022 Negative   11/28/2021 Negative   09/28/2021 Negative   09/05/2021 Negative   05/14/2021 Negative       ECG Results          EKG 12-lead (Final result)  Result time 02/12/22 09:41:30    Final result by Interface, Lab In [facility] (02/12/22 09:41:30)                 Narrative:    Test Reason :     Vent. Rate : 057 BPM     Atrial Rate : 059 BPM     P-R Int : 184 ms          QRS Dur : 080 ms      QT Int : 426 ms       P-R-T Axes : -40 -13 -15 degrees     QTc Int : 415 ms    Sinus bradycardia  Voltage criteria for left ventricular hypertrophy  Lateral infarct (cited on or before 11-FEB-2022)  Abnormal ECG  When compared with ECG of 11-FEB-2022 08:39,  No significant change was found  Confirmed by KEVYN GARSIA MD ([id number]) on 2/12/2022 9:41:21 AM    Referred By: [email or web address]   SELF           Confirmed By:KEVYN GARSIA MD                             EKG 12-lead (Final result)  Result time 02/12/22 09:39:20    Final result by Interface, Lab In [facility] (02/12/22 09:39:20)                 Narrative:    Test Reason : I95.9,    Vent. Rate : 066 BPM     Atrial Rate : 068 BPM     P-R Int : 000 ms          QRS Dur : 072 ms      QT Int : 402 ms       P-R-T Axes : 000 -15 -08 degrees     QTc Int : 422 ms    Technically poor ECG tracing  Sinus rhythm  Voltage criteria for left  ventricular hypertrophy  Lateral infarct ,age undetermined  Abnormal ECG  When compared with ECG of 21-JAN-2022 12:55,  Questionable change in in R wave progression  Lateral infarct is now Present  T wave inversion now evident in Inferior leads  Confirmed by KEVYN GARSIA MD ([id number]) on 2/12/2022 9:39:06 AM    Referred By: ELVA   SELF           Confirmed By:KEVYN GARSIA MD                              Results for orders placed during the hospital encounter of 11/28/21    Echo    Interpretation Summary  · The left ventricle is normal in size with normal systolic function. The estimated ejection fraction is 60%.  · Normal right ventricular size with normal right ventricular systolic function.  · Normal left ventricular diastolic function.  · Mechanically ventilated; cannot use inferior caval vein diameter to estimate central venous pressure.      SURG FL Surgery Fluoro Usage  See OP Notes for results.     IMPRESSION: See OP Notes for results.     This procedure was auto-finalized by: Virtual Radiologist      Labs and Imaging within the last 24 hours listed above were reviewed.       Diet: Diet renal [facility]; Fluid - 1500mL  Significant LDAs:   IV Access Type: Dialysis Access  Urinary Catheter Indication if present: Patient Does Not Have Urinary Catheter  Other Lines/Tubes/Drains: SPC    HIGH RISK CONDITION(S):   Patient has an abrupt change in neurologic status: Weakness     Goals of Care:    Previous admission:  1/21/22  Likely prognosis:  Fair  Code Status: Full Code  Comfort Only: No  Hospice: No  Goals at discharge: remain at home, with physician follow-up    Discharge Planning   FLORENTINO: 2/19/2022     Code Status: Full Code   Is the patient medically ready for discharge?: No    Reason for patient still in hospital (select all that apply): Patient trending condition and Pending disposition  Discharge Plan A: Home Health

## 2022-02-18 NOTE — ASSESSMENT & PLAN NOTE
- uncontrolled on admit  - continued Levemir 13 units daily, Novolog 4 units TIDW, SSI  - up titrate as needed

## 2022-02-18 NOTE — PROGRESS NOTES
[facility] - Telemetry [facility] ([address] Medicine  Telemedicine Progress Note    Patient Name: Cecile Bowen  MRN: [id number]  Patient Class: IP- Inpatient   Admission Date: 2/11/2022  Length of Stay: 3 days  Attending Physician: Liana Ruiz MD  Primary Care Provider: Kailey Navarro MD      Subjective:     Principal Problem:Acute cystitis with hematuria    HPI:  Cecile Bowen is a 71 y.o. female with PMHx significant for HTN, HLD, hx of ESBL UTI, ESRD on HD admitted to observation for hypotension, found to have a UTI. Patient was at her dialysis clinic today when she was found to have a BP of 76/43 and advised to come to the ED for evaluation. Reports her SBPs usually run in the 120s. Endorses cloudy urine in her catheter bag for the past few days, and reports it was last changed about a week ago. Denies lightheadedness, dizziness, SOB, fatigue, weakness, HA, CP, cough, abdominal pain, n/v. Patient was recently hospitalized for a ESBL UTI for which she completed her course of meropenem. Of note, patient also has a right subclavian dialysis access that was also changed about a week ago.     In the ED, BP 94/24 improved to 102/57. Remaining VSS. WBC 8.06. Lactic 1.8. Procal 0.12. Hgb 8.5 (~BL). . Cr 2.3 (~BL). Glucose 224. UA with 1+ leuks, 28 RBCs, >100 WBCs. CXR with no acute processes. Given Vanc 2g x 1.       Overview/Hospital Course:  Admitted to  for possible UTI. Cultures obtained, suprapubic catheter changes apparently in the ED, and started Abx. Nephrology consulted for HD. Pt continued on broad antibiotics w/improvement in signs and symptoms. Final culture results w/klebsiella pneumonia- susceptible to connie and bactrim. Urology evaluated patient, 02/14. Pt w/h/o Lt ureteral stone- s/p stent placement 06/2021 and replacement 12/2021- intermittently lost to follow-up. Planning for ureteroscopy 02/18 w/definitive stone management and stent removal as patient has high  likelihood to continue to be lost to f/u. Agreed that it was in the best interest of the patient to remain inpatient to undergo further interventions/evaluations via urology. Pt was transitioned to bactrim 02/16- connie stopped.      Telemedicine  This service was provided by Virtual Visit.    Patient was transferred to [facility] on:  2/17/22     Chief Complaint   Patient presents with    Hypotension     [facility] with c/o hypotension SBP 80s, sent for possible urosepsis, recent UTI diagnosis, did not receive dialysis today, last dialyzed wednesday       The [facility]   Admitted 2/11/2022  8:18 AM  Present with the patient at the time of the telemed/virtual assessment: Telepresenter    Interval History / Events Overnight:   The patient is able to provide adequate history. Additional history was obtained from past medical records. No significant events reported by Nursing.  BP elevated.  Patient complains of nothing specific. Symptoms have improved since yesterday. Associated symptoms include: fatigue. Symptoms are decreasing in severity.     Lab test(s) reviewed: H&H stable    Review of Systems   Constitutional: Negative for fever.   Respiratory: Negative for shortness of breath.      Objective:     Vital Signs (Most Recent):  Temp: 97.6 °F (36.4 °C) (02/17/22 1155)  Pulse: 62 (02/17/22 1155)  Resp: 20 (02/17/22 1155)  BP: (!) 189/76 (02/17/22 1155)  SpO2: 100 % (02/17/22 1155) Vital Signs (24h Range):  Temp:  [97.6 °F (36.4 °C)-98.4 °F (36.9 °C)] 97.6 °F (36.4 °C)  Pulse:  [58-69] 62  Resp:  [16-20] 20  SpO2:  [95 %-100 %] 100 %  BP: ()/(60-76) 189/76     Weight: 105.2 kg (231 lb 14.8 oz)  Body mass index is 41.08 kg/m².    Intake/Output Summary (Last 24 hours) at 2/17/2022 1304  Last data filed at 2/17/2022 0900  Gross per 24 hour   Intake 120 ml   Output 2050 ml   Net -1930 ml      Physical Exam  Constitutional:       General: She is  not in acute distress.     Appearance: Normal appearance. She is not diaphoretic.   Eyes:      General: Lids are normal. No scleral icterus.        Right eye: No discharge.         Left eye: No discharge.      Conjunctiva/sclera: Conjunctivae normal.   Cardiovascular:      Rate and Rhythm: Normal rate.   Pulmonary:      Effort: Pulmonary effort is normal. No tachypnea, accessory muscle usage or respiratory distress.   Abdominal:      General: There is no distension.   Skin:     Coloration: Skin is not cyanotic.   Neurological:      Mental Status: She is alert. She is not disoriented.   Psychiatric:         Attention and Perception: Attention normal.         Mood and Affect: Affect normal.         Behavior: Behavior is cooperative.         Significant Labs:     Recent Labs   Lab 09/05/21  2139 12/21/21  0803 01/03/22  0000   HGBA1C 6.6* 5.3 5.4   :   Recent Labs   Lab 02/16/22  1920 02/17/22  0747 02/17/22  1152   POCTGLUCOSE 128* 181* 186*     Recent Labs   Lab 02/12/22  0404 02/13/22  0514 02/14/22  0355   WBC 6.51 5.58 5.76   HGB 8.1* 9.2* 9.2*   HCT 26.4* 29.7* 30.4*    347 332     Recent Labs   Lab 02/12/22  0404 02/12/22  0404 02/13/22  0514 02/14/22  0355   GRAN 53.1  3.5   < > 43.2  2.4 43.5  2.5   LYMPH 26.3  1.7   < > 33.2  1.9 32.5  1.9   MONO 11.7  0.8   < > 14.9  0.8 14.1  0.8   EOS 0.5  --  0.4 0.5    < > = values in this interval not displayed.     Recent Labs   Lab 02/11/22  0849 02/11/22  0849 02/12/22  0404 02/14/22  0355 02/15/22  0818 02/16/22  0358 02/17/22  0333   *   < > 132*   < > 131* 133* 132*   K 3.9   < > 3.9   < > 4.5 4.6 4.6   CL 92*   < > 97   < > 100 100 100   CO2 26   < > 24   < > 25 22* 21*   BUN 36*   < > 37*   < > 27* 30* 34*   CREATININE 2.3*   < > 2.2*   < > 2.0* 2.1* 2.1*   *   < > 102   < > 151* 138* 143*   CALCIUM 9.3   < > 9.1   < > 9.5 9.3 9.1   ALBUMIN 2.3*  --   --   --  2.0*  --  2.0*   MG  --   --   --   --   --   --  2.0   PHOS  --   --   5.0*  --  4.4  --  4.5    < > = values in this interval not displayed.     Recent Labs   Lab 11/28/21  1952/21  0413 12/06/21  0758 01/03/22  0000 01/23/22  0332 02/09/22  0000 02/11/22  0849 02/11/22  1304   PROCAL  --   --  0.69*  --   --   --  0.12  --    LACTATE 0.8  --   --   --   --   --  1.8 1.4   FERRITIN  --    < >  --  353* 812* 470*  --   --     < > = values in this interval not displayed.     Results for orders placed or performed in visit on 02/09/22   Vitamin D   Result Value Ref Range    Vit D, 25-Hydroxy 58.8 30.0 - 100.0 ng/mL     SARS-CoV2 (COVID-19) Qualitative PCR (no units)   Date Value   02/15/2022 Not Detected   12/28/2021 Not Detected   12/21/2021 Not Detected   09/05/2021 Not Detected     POC Rapid COVID (no units)   Date Value   02/11/2022 Negative   01/21/2022 Negative   01/20/2022 Negative   11/28/2021 Negative   09/28/2021 Negative   09/05/2021 Negative   05/14/2021 Negative       ECG Results          EKG 12-lead (Final result)  Result time 02/12/22 09:41:30    Final result by Interface, Lab In [facility] (02/12/22 09:41:30)                 Narrative:    Test Reason :     Vent. Rate : 057 BPM     Atrial Rate : 059 BPM     P-R Int : 184 ms          QRS Dur : 080 ms      QT Int : 426 ms       P-R-T Axes : -40 -13 -15 degrees     QTc Int : 415 ms    Sinus bradycardia  Voltage criteria for left ventricular hypertrophy  Lateral infarct (cited on or before 11-FEB-2022)  Abnormal ECG  When compared with ECG of 11-FEB-2022 08:39,  No significant change was found  Confirmed by KEVYN GARSIA MD ([id number]) on 2/12/2022 9:41:21 AM    Referred By: [email or web address]   SELF           Confirmed By:KEVYN GARSIA MD                             EKG 12-lead (Final result)  Result time 02/12/22 09:39:20    Final result by Interface, Lab In [facility] (02/12/22 09:39:20)                 Narrative:    Test Reason : I95.9,    Vent. Rate : 066 BPM     Atrial Rate : 068 BPM     P-R Int : 000 ms          QRS Dur : 072  ms      QT Int : 402 ms       P-R-T Axes : 000 -15 -08 degrees     QTc Int : 422 ms    Technically poor ECG tracing  Sinus rhythm  Voltage criteria for left ventricular hypertrophy  Lateral infarct ,age undetermined  Abnormal ECG  When compared with ECG of 21-JAN-2022 12:55,  Questionable change in in R wave progression  Lateral infarct is now Present  T wave inversion now evident in Inferior leads  Confirmed by KEVYN GARSIA MD ([id number]) on 2/12/2022 9:39:06 AM    Referred By: [email or web address]   SELF           Confirmed By:KEVYN GARSIA MD                              Results for orders placed during the hospital encounter of 11/28/21    Echo    Interpretation Summary  · The left ventricle is normal in size with normal systolic function. The estimated ejection fraction is 60%.  · Normal right ventricular size with normal right ventricular systolic function.  · Normal left ventricular diastolic function.  · Mechanically ventilated; cannot use inferior caval vein diameter to estimate central venous pressure.      X-Ray Chest AP Portable  Narrative: EXAMINATION:  XR CHEST AP PORTABLE    CLINICAL HISTORY:  Sepsis;    COMPARISON:  Comparison is made to 01/21/2022 at 13:20.    FINDINGS:  Vascular catheter tip is in the right atrium.  Heart size is within normal limits, and there has been no detrimental change in the appearance of the cardiomediastinal silhouette or pulmonary vascularity since the examination referenced above.  No findings to specifically suggest cardiac decompensation or volume overload.  Lung zones are clear allowing for a poor inspiratory depth level, and are free of significant airspace consolidation or volume loss.  No pleural fluid of any substantial volume is seen on either side, with the minimal blunting of the right costophrenic sulcus seen on the prior exam no longer noted.  No pneumothorax.  Right upper quadrant surgical clips are incidentally noted.  Impression: No significant intrathoracic  abnormality or detrimental interval change in the appearance of the chest since 01/21/2022 at 13:20 appreciated, noting that the current examination demonstrates a poorer inspiratory depth level than that prior study.    Electronically signed by: Nithin Valadez MD  Date:    02/11/2022  Time:    09:45      Labs and Imaging within the last 24 hours listed above were reviewed.       Diet: Diet NPO  Diet NPO  Diet renal [facility]; Fluid - 1500mL  Significant LDAs:   IV Access Type: Dialysis Access  Urinary Catheter Indication if present: Patient Does Not Have Urinary Catheter  Other Lines/Tubes/Drains: SPC    HIGH RISK CONDITION(S):   Patient has an abrupt change in neurologic status: Weakness     Goals of Care:    Previous admission:  1/21/22  Likely prognosis:  Fair  Code Status: Full Code  Comfort Only: No  Hospice: No  Goals at discharge: remain at home, with physician follow-up    Discharge Planning   FLORENTINO: 2/19/2022     Code Status: Full Code   Is the patient medically ready for discharge?: No    Reason for patient still in hospital (select all that apply): Patient trending condition and Pending disposition  Discharge Plan A: Home Health            Assessment/Plan:      * Acute cystitis with hematuria  - SIRS: 0/4; hypotension  - Hx of ESBL UTI; completed course of meropenem  - UA with 1+ leuks, 28 RBCs, >100 WBCs  - UCx w/GNR >100,000- susceptibilities to connie and bactrim- continue antibiotics leading up to ureteroscopy and dc post-op- stop connie - transition to Bactrim DS qday until sat- 02/19  - Given Vanc in ED   - suprapubic catheter changed in the ED    CKD (chronic kidney disease) requiring chronic dialysis  - HD MWF  - Nephrology following  - continue sevelamer carbonate 800mg TIDW  - improving    Pressure injury of right buttock, stage 3  - present on admission  - wound care consulted, appreciate recs  - turn patient q2h    Urinary tract infection due to ESBL Klebsiella  - hx of      Secondary  hypertension  - Continue lopressor, lisinopril and clonidine  - Holding Lasix   - Titrate BP meds accordingly    A-fib  - continue Eliquis 2.5mg BID  - continue lopressor 25mg BID     Left ureteral stone  - h/o left ureteral stone and indwelling left ureteral stent  - stent placed 06/30/21- pt lost to fu- stent exchanged 12/23/21  - Urology following: plan for ureteroscopy w/laser lithotripsy vs. Stone basket extraction vs. Stent replacement 02/18/22    Malignant neoplasm of left breast, estrogen receptor positive  - continue home anastrozole 1mg daily    Chronic pain  - follows w/pain specialist  - Improved back pain   - continue norco 10mg q6h for mod pain    Suprapubic catheter  - exchanged Q month    Uncontrolled type 2 diabetes mellitus with stage 4 chronic kidney disease, with long-term current use of insulin  - uncontrolled on admit  - continued Levemir 13 units daily, Novolog 4 units TIDW, SSI  - up titrate as needed    Mixed migraine and muscle contraction headache  - stable  - home Fioricet and sumatriptan prn      Neurogenic bladder  - noted, has suprapubic catheter     Urinary retention  - continue home oxybutynin 10mg daily    Recurrent urinary tract infection  - see above    Hyperlipidemia associated with type 2 diabetes mellitus  - continue Lipitor 80mg daily    Hypothyroidism  - continue home Synthroid 50mcg        Active Hospital Problems    Diagnosis  POA    *Acute cystitis with hematuria [N30.01]  Yes    CKD (chronic kidney disease) requiring chronic dialysis [N18.6, Z99.2]  Not Applicable    Pressure injury of right buttock, stage 3 [L89.313]  Yes    Urinary tract infection due to ESBL Klebsiella [N39.0, B96.89]  Yes    Secondary hypertension [I15.9]  Yes    A-fib [I48.91]  Yes     Chronic    Left ureteral stone [N20.1]  Yes    Malignant neoplasm of left breast, estrogen receptor positive [C50.912, Z17.0]  Not Applicable     Chronic    Chronic pain [G89.29]  Yes     Chronic     Suprapubic catheter [Z93.59]  Not Applicable     Chronic    Uncontrolled type 2 diabetes mellitus with stage 4 chronic kidney disease, with long-term current use of insulin [E11.22, E11.65, N18.4, Z79.4]  Not Applicable    Mixed migraine and muscle contraction headache [G43.909, G44.209]  Yes    Neurogenic bladder [N31.9]  Yes     Chronic    Urinary retention [R33.9]  Yes    Recurrent urinary tract infection [N39.0]  Yes    Hyperlipidemia associated with type 2 diabetes mellitus [E11.69, E78.5]  Yes     Chronic    Hypothyroidism [E03.9]  Yes     Chronic      Resolved Hospital Problems   No resolved problems to display.       Inpatient Medications Prescribed for Management of Current Problems:     Scheduled Meds:    anastrozole  1 mg Oral Daily    apixaban  2.5 mg Oral BID    atorvastatin  80 mg Oral Daily    cloNIDine  0.2 mg Oral TID    famotidine  20 mg Oral Daily    insulin aspart U-100  4 Units Subcutaneous TIDWM    insulin detemir U-100  13 Units Subcutaneous Daily    levothyroxine  50 mcg Oral Before breakfast    lisinopriL  40 mg Oral Daily    magnesium oxide  400 mg Oral BID    metoprolol tartrate  25 mg Oral BID    oxybutynin  10 mg Oral Daily    sevelamer carbonate  800 mg Oral TID WM    sulfamethoxazole-trimethoprim 800-160mg  1 tablet Oral Daily    venlafaxine  150 mg Oral Daily     Continuous Infusions:   As Needed: acetaminophen, bisacodyL, cloNIDine, dextrose 10%, dextrose 10%, glucagon (human recombinant), glucose, glucose, heparin (porcine), HYDROcodone-acetaminophen, influenza, insulin aspart U-100, magnesium oxide, magnesium oxide, melatonin, methocarbamoL, naloxone, ondansetron, polyethylene glycol, promethazine, sodium chloride 0.9%, sumatriptan, traZODone    VTE Risk Mitigation (From admission, onward)         Ordered     heparin (porcine) injection 1,000 Units  As needed (PRN)         02/12/22 1104     apixaban tablet 2.5 mg  2 times daily         02/11/22 1342     IP VTE  HIGH RISK PATIENT  Once         02/11/22 1132     Place sequential compression device  Until discontinued         02/11/22 1132              I have assessed these finding virtually using telemed platform and with assistance of bedside nurse     The attending portion of this evaluation, treatment, and documentation was performed per Liana Ruiz MD via Telemedicine AudioVisual using the secure combionic software platform with 2 way audio/video. The provider was located off-site and the patient is located in the hospital. The aforementioned video software was utilized to document the relevant history and physical exam    Liana Ruiz MD  Department of Hospital Medicine   [facility] - Telemetry Stepdown ([place])

## 2022-02-18 NOTE — PROGRESS NOTES
[facility] - Telemetry Stepdown ([place] [address])  Urology  Progress Note    Patient Name: Cecile Bowen  MRN: [id number]  Admission Date: 2/11/2022  Hospital Length of Stay: 4 days  Code Status: Full Code   Attending Provider: Liana Ruiz MD   Primary Care Physician: Kailey Navarro MD    Subjective:     HPI:  No notes on file    Interval History: AF. HDS. Urine output adequate. OR today    Review of Systems    Objective:     Temp:  [97 °F (36.1 °C)-98.3 °F (36.8 °C)] 97 °F (36.1 °C)  Pulse:  [60-71] 71  Resp:  [16-20] 18  SpO2:  [92 %-100 %] 92 %  BP: (117-189)/(62-76) 117/62     Body mass index is 41.08 kg/m².           Drains     Drain                 Hemodialysis Catheter right internal jugular -- days         Hemodialysis Catheter right subclavian -- days         Suprapubic Catheter 01/23/22 1530 16 Fr. 24 days                Physical Exam  Constitutional:       Appearance: She is obese.   HENT:      Head: Normocephalic.   Eyes:      Pupils: Pupils are equal, round, and reactive to light.   Cardiovascular:      Rate and Rhythm: Normal rate.   Pulmonary:      Effort: Pulmonary effort is normal.   Abdominal:      Palpations: Abdomen is soft.   Genitourinary:     Comments: Purewick  Musculoskeletal:         General: Normal range of motion.      Cervical back: Normal range of motion.   Neurological:      General: No focal deficit present.      Mental Status: She is alert.   Psychiatric:         Mood and Affect: Mood normal.         Significant Labs:    BMP:  Recent Labs   Lab 02/16/22  0358 02/17/22  0333 02/18/22  0231   * 132* 130*   K 4.6 4.6 5.3*    100 102   CO2 22* 21* 20*   BUN 30* 34* 35*   CREATININE 2.1* 2.1* 2.1*   CALCIUM 9.3 9.1 8.7       CBC:   Recent Labs   Lab 02/12/22  0404 02/13/22  0514 02/14/22  0355   WBC 6.51 5.58 5.76   HGB 8.1* 9.2* 9.2*   HCT 26.4* 29.7* 30.4*    347 332       All pertinent labs results from the past 24 hours have been reviewed.    Significant  Imaging:  All pertinent imaging results/findings from the past 24 hours have been reviewed.                  Assessment/Plan:     Left ureteral stone  -OR today  -NPO midnight          VTE Risk Mitigation (From admission, onward)         Ordered     heparin (porcine) injection 1,000 Units  As needed (PRN)         02/12/22 1104     apixaban tablet 2.5 mg  2 times daily         02/11/22 1342     IP VTE HIGH RISK PATIENT  Once         02/11/22 1132     Place sequential compression device  Until discontinued         02/11/22 1132                Dayanara Hills MD  Urology  [facility] - Telemetry Stepdown ([place]-)

## 2022-02-18 NOTE — ASSESSMENT & PLAN NOTE
70 yo F admitted with concern for sepsis due to UTI. UA with 1+ leuks, 28 RBCs, >100 WBCs.   ZAK on CKD IV secondary to septic shock. Dialysis dependent ZAK since 12/15/21      ESRD on IHD MWF  Out patient HD Center - [facility] / Dr. Hurst   Duration of outpatient dialysis session - 3.5 hrs  EDW: 114 kg   Residual Renal Function (Urine Output) -   Access:R CVC    -No emergent need for clearance and volume management today. Pt has excellent urine output (2.5L in 24 hours) and has some renal clearance, will continue to monitor labs closely  -repeat BMP this evening to f/u on K of 5.3 earlier this morning  -CKD anemia: hgb 9.2  -Mineral & bone disease: phos 4.5, PTH 69, will consider holding phos binders if phos drops <3   -Strict Input and Output and chart   -Daily weights.  -Low NA, K, PO4 diet and fluid restriction please (she had bags of cheetos and many beverages on her side table)   -Will evaluate HD requirements Daily

## 2022-02-18 NOTE — CONSULTS
Chart reviewed - pt not seen. Consult placed for meropenem periop for procedure today given prior history of ESBL Kleb on 2/11 - ok for meropenem periop. Can reconsult with questions. Will sign off.

## 2022-02-18 NOTE — ANESTHESIA POSTPROCEDURE EVALUATION
Anesthesia Post Evaluation    Patient: Cecile Bowen    Procedure(s) Performed: Procedure(s) (LRB):  CYSTOSCOPY  PYELOGRAM, RETROGRADE (Left)  URETEROSCOPY (Left)  REPLACEMENT, STENT (Left)  REMOVAL-STENT  EXCHANGE SUPRAPUBIC CATHETER  BIOPSY, URETER (Left)    Final Anesthesia Type: general      Patient location during evaluation: PACU  Patient participation: Yes- Able to Participate  Level of consciousness: awake and alert and oriented  Post-procedure vital signs: reviewed and stable  Pain management: adequate  Airway patency: patent    PONV status at discharge: No PONV  Anesthetic complications: no      Cardiovascular status: stable  Respiratory status: unassisted, spontaneous ventilation and room air  Hydration status: euvolemic  Follow-up not needed.          Vitals Value Taken Time   /70 02/18/22 1250   Temp 36.4 °C (97.5 °F) 02/18/22 1229   Pulse 65 02/18/22 1254   Resp 15 02/18/22 1254   SpO2 98 % 02/18/22 1254   Vitals shown include unvalidated device data.      No case tracking events are documented in the log.      Pain/Audrey Score: Pain Rating Prior to Med Admin: 8 (2/18/2022  8:14 AM)  Pain Rating Post Med Admin: 7 (2/17/2022  2:19 PM)  Audrey Score: 10 (2/18/2022 12:45 PM)  Modified Audrey Score: 20 (2/18/2022 12:45 PM)

## 2022-02-18 NOTE — PROGRESS NOTES
[facility] (1st Fl)  Adult Nutrition  Progress Note    SUMMARY       Recommendations    1. Resume Renal diet when able    - Add diabetic restrictions if BG >180 mg/dL     2. Once diet advanced, add Novasource Renal TID     3. Once diet advanced, add Alirio BID x 14 days to aid in wound healing    Goals: Meet % EEN, EPN by RD f/u date  Nutrition Goal Status: progressing towards goal    Communication of RD Recs: reviewed with RN    Assessment and Plan    Nutrition Problem  Altered nutrition related lab values      Related to (etiology):   ESRD     Signs and Symptoms (as evidenced by):   Elevated BUN/Creat, decreased GFR     Interventions(treatment strategy):  Collaboration of nutrition care w/ other providers  Phos, K, Na-modified diet     Nutrition Diagnosis Status:   Continues    Malnutrition Assessment       Orbital Region (Subcutaneous Fat Loss): mild depletion  Upper Arm Region (Subcutaneous Fat Loss): mild depletion   Samaritan Region (Muscle Loss): mild depletion  Clavicle Bone Region (Muscle Loss): well nourished  Clavicle and Acromion Bone Region (Muscle Loss): well nourished         Reason for Assessment    Reason For Assessment: RD follow-up  Diagnosis:  (Acute cystitis with hematuria)  Relevant Medical History: HTN, HLD, ESRD on HD  Interdisciplinary Rounds: attended    General Information Comments: Pt XI for uteroscopy today however RD able to catch pt in the hallway/elevator alone during transport to procedure. Pt repors fair appetite since last RD visit, tolerating ~50% PO intake with no c/o n/v/d/c or difficulties chewing/swallowing. Food preferences documented in Er[employer] software. Sacral wound noted. Unsure of UBW; 39# wt loss noted x 3 months. Possibly fluid related as pt is -14L since admit. NFPE completed 2/18; pt with some mild age-appropriate wasting. No indicators of malnutrition at this time, however at risk with wt loss.    Nutrition Discharge Planning: Renal/diabetic  "diet    Nutrition Risk Screen    Nutrition Risk Screen: no indicators present    Nutrition/Diet History    Patient Reported Diet/Restrictions/Preferences: diabetic diet,renal  Spiritual, Cultural Beliefs, Orthodox Practices, Values that Affect Care: no  Food Allergies: NKFA  Factors Affecting Nutritional Intake: NPO    Anthropometrics    Temp: 98.9 °F (37.2 °C)  Height Method: Stated  Height: 5' 3" (160 cm)  Height (inches): 63 in  Weight Method: Bed Scale  Weight: 104.8 kg (231 lb)  Weight (lb): 231 lb  Ideal Body Weight (IBW), Female: 115 lb  % Ideal Body Weight, Female (lb): 200.87 %  BMI (Calculated): 40.9  BMI Grade: greater than 40 - morbid obesity  Weight Loss: unintentional  Usual Body Weight (UBW), k.5 kg (Per chart review.)  % Usual Body Weight: 86.06  % Weight Change From Usual Weight: -14.12 %       Lab/Procedures/Meds    Pertinent Labs Reviewed: reviewed  Pertinent Labs Comments: Na 130, K 5.3, BUN 35, Creat 2.1, GFR 23.5, Total protein 5.5, Alb 1.8  Pertinent Medications Reviewed: reviewed  Pertinent Medications Comments: insulin, clonidine, levothyroxine, Mg, lopressor    Estimated/Assessed Needs    Weight Used For Calorie Calculations: 105.2 kg (231 lb 14.8 oz)  Energy Calorie Requirements (kcal): 1701 kcal/d  Energy Need Method: Scurry-St Jeor (1.1 PAL)  Protein Requirements: 105-116 g/d (1-1.1 g/kg)  Weight Used For Protein Calculations: 105.2 kg (231 lb 14.8 oz)  Fluid Requirements (mL): 1 mL/kcal or per MD  Estimated Fluid Requirement Method: RDA Method  RDA Method (mL): 1701       Nutrition Prescription Ordered    Current Diet Order: NPO    Evaluation of Received Nutrient/Fluid Intake    I/O: -14L since admit  Energy Calories Required: not meeting needs  Protein Required: not meeting needs  Comments: LBM   Tolerance: tolerating  % Intake of Estimated Energy Needs: 0 - 25 %  % Meal Intake: NPO    Nutrition Risk    Level of Risk/Frequency of Follow-up: low     Monitor and " Evaluation    Food and Nutrient Intake: energy intake,food and beverage intake  Food and Nutrient Adminstration: diet order  Knowledge/Beliefs/Attitudes: food and nutrition knowledge/skill  Physical Activity and Function: nutrition-related ADLs and IADLs  Anthropometric Measurements: weight,weight change  Biochemical Data, Medical Tests and Procedures: inflammatory profile,lipid profile,glucose/endocrine profile,electrolyte and renal panel  Nutrition-Focused Physical Findings: overall appearance     Nutrition Follow-Up    RD Follow-up?: Yes

## 2022-02-18 NOTE — PLAN OF CARE
Problem: UTI (Urinary Tract Infection)  Goal: Improved Infection Symptoms  Outcome: Met  Patient Aox4, remains on RA. SP catheter in place, output 1250 mL overnight. HS glucose WNL, no insulin coverage required. Plans for ureteroscopy today, NPO status maintained. Norco & robaxin given x1 for c/o back pain. Wound care performed to sacrum this morning. K 5.3, lokelma given. All needs met overnight.

## 2022-02-18 NOTE — TRANSFER OF CARE
"Anesthesia Transfer of Care Note    Patient: Cecile Bowen    Procedure(s) Performed: Procedure(s) (LRB):  CYSTOSCOPY  PYELOGRAM, RETROGRADE (Left)  URETEROSCOPY (Left)  REPLACEMENT, STENT (Left)  REMOVAL-STENT  EXCHANGE SUPRAPUBIC CATHETER  BIOPSY, URETER (Left)    Patient location: PACU    Anesthesia Type: general    Transport from OR: Transported from OR on 6-10 L/min O2 by face mask with adequate spontaneous ventilation    Post pain: adequate analgesia    Post assessment: no apparent anesthetic complications and tolerated procedure well    Post vital signs: stable    Level of consciousness: awake, alert and oriented    Nausea/Vomiting: no nausea/vomiting    Complications: none    Transfer of care protocol was followed      Last vitals:   Visit Vitals  BP (!) 142/67 (BP Location: Right arm, Patient Position: Lying)   Pulse 66   Temp 37.2 °C (98.9 °F) (Temporal)   Resp 18   Ht 5' 3" (1.6 m)   Wt 104.8 kg (231 lb)   SpO2 99%   Breastfeeding No   BMI 40.92 kg/m²     "

## 2022-02-18 NOTE — OP NOTE
[facility]  Operative Note    Date: 02/18/2022    Pre-Op Diagnosis: left ureteral stone    Post-Op Diagnosis: none    Procedure(s) Performed:   1.  Left ureteroscopy  2.  Cystoscopy  3.  Left ureteral biopsy  4.  Left JJ ureteral stent exchange  5.  Left retrograde pyelogram  6.  Suprapubic catheter exchanged  7.  Fluoro < 1 h    Specimen(s): ureteral biopsy    Staff Surgeon: Ronel Whittington MD    Assistant Surgeon: Anders Crane MD, Ty Rivera MD    Anesthesia: General endotracheal anesthesia    Indications: Cecile Bowen is a 71 y.o. female with a left ureteral stone, presenting for definitive stone management.  She currently does have a JJ ureteral stent in place.      Findings:   No stone found  Large amount of purulent debris in renal pelvis, irrigated out  Ureteral tissue present in upper pole, removed with basket, appeared like irritated ureteral mucosa from stent, sent for pathology  Left JJ ureteral stent with strings left in place    1 baskets were used throughout the case.      Estimated Blood Loss: min    Drains:   6 Fr x 24 cm JJ ureteral stent with strings  16 Fr Tiwari catheter    Procedure in detail:  After informed consent was obtained, the patient was brought the the cystoscopy suite and placed in the supine position.  SCDs were applied and working.  Anesthesia was administered.  The patient was then placed in the dorsal lithotomy position and prepped and draped in the usual sterile fashion.      A rigid cystoscope in a 22 Fr sheath was introduced into the patient's urethra.  This passed easily.  The entire urethra was visualized which showed no strictures or masses.  Formal cystoscopy was performed which revealed no masses or lesions suspicious for malignancy, no bladder stones, no bladder diverticuli, no trabeculations.  The ureteral orifices were visualized in the normal anatomic position bilaterally and the left JJ ureteral stent was visualized and grasped with stent  graspers.     A motion wire was passed up the stent and up into the kidney and the stent was removed.  This passed easily and placement was confirmed using fluoro.  The cystoscope was removed keeping the guidewire in place and the wire was secured to the drape.      An 8 Fr rigid ureteroscope was passed into the patient's bladder alongside the wire under direct vision.  It was then passed through the left ureteral orifice alongside the wire.  A stone was not encountered. A stiff glide wire was then passed up into the kidney.  The ureteroscope was removed keeping the wires in place.      A 12/14 35cm ureteral access sheath was then passed over the free wire under fluoroscopic guidance.  Subsequently, the flexible ureteroscope was passed into the patient's ureter through the access sheath under direct vision. Flexible pyeloscopy was performed systematically, no stones were encountered. Purulent debris was present in the renal pelvis, this was irrigated out.     There was what appeared to be sloughed ureteral mucosa, this was grasped with a basket and sent for pathology.     A retrograde pyelogram was performed through the ureteroscope.  There were no filling defects noted and the renal pelvis was opacified.  The ureteroscope was removed keeping the wire in place.  The entire course of the ureter was visualized as the ureteroscope and access sheath were simultaneously removed.  There were no significant ureteral fragments left behind.     A 6 Fr x 24 cm JJ ureteral stent with strings was passed over the wire and up into the renal pelvis using fluoro.  When the coil appeared to be in good position in the kidney the wire was removed under continuous fluoro.  Good coils were seen in the kidney and the bladder using fluoro.      The suprapubic catheter was then exchanged for a new 16 Fr catheter.     The patient tolerated the procedure well and was transferred to the recovery room in stable condition.      Disposition:   The patient will remove their stent on strings on Monday morning.    Anders Crane

## 2022-02-18 NOTE — PROGRESS NOTES
[facility] - Tele[facility] Stepdown ([place] [id number])  Nephrology  Progress Note    Patient Name: Cecile Bowen  MRN: [id number]  Admission Date: 2/11/2022  Hospital Length of Stay: 4 days  Attending Provider: Liana Ruiz MD   Primary Care Physician: Kailey Navarro MD  Principal Problem:Acute cystitis with hematuria    Subjective:     HPI: The patient is a 70 yo F with a history of HTN, HLD, hx of ESBL UTI, ESRD on HD admitted to observation for hypotension. The patient arrived to her dialysis clinic today and was found to be hypotensive in the 70s. Her treatment was canceled and she was instructed to come to the ED for evaluation and treatment. She was also noted to have purulent output in her catheter bag which was changed on arrival to the ED. She was recently hospitalized for ESBL UTI from 1/21-2/3. She currently denies lightheadedness, dizziness, SOB, fatigue, weakness, HA, CP, cough, abdominal pain, n/v. In the ED, BP 94/24 improved to 102/57. Remaining VSS. WBC 8.06. Lactic 1.8. Procal 0.12. Hgb 8.5 (~BL). . Cr 2.3 (~BL). Glucose 224. UA with 1+ leuks, 28 RBCs, >100 WBCs. CXR with no acute processes. Given Vanc 2g x 1.   The patient was initiated on RRT on 12/15/21 due to severe acidosis and hyperkalemia. She has been dialysis dependent since. She was last dialyze on Wednesday with no issue. She has a R CVC. Nephrology consulted for HD dependent ZAK.            Interval History:   No acute events overnight. 's-180's in the last 24 hours.   Pt underwent the following procedures today:   L ureteroscopy, Cystoscopy, Left ureteral biopsy, Left JJ ureteral stent exchange, Left retrograde pyelogram, Suprapubic catheter exchanged    Last iHD 2/12 net 2.5L    Pt states feeling well, has no complaints.     Review of patient's allergies indicates:  No Known Allergies  Current Facility-Administered Medications   Medication Frequency    0.9%  NaCl infusion Continuous    acetaminophen tablet 650 mg  Q8H PRN    anastrozole tablet 1 mg Daily    apixaban tablet 2.5 mg BID    atorvastatin tablet 80 mg Daily    bisacodyL suppository 10 mg Daily PRN    cloNIDine tablet 0.2 mg Q8H PRN    cloNIDine tablet 0.2 mg TID    dextrose 10% bolus 125 mL PRN    dextrose 10% bolus 250 mL PRN    famotidine tablet 20 mg Daily    glucagon (human recombinant) injection 1 mg PRN    glucose chewable tablet 16 g PRN    glucose chewable tablet 24 g PRN    heparin (porcine) injection 1,000 Units PRN    HYDROcodone-acetaminophen  mg per tablet 1 tablet Q6H PRN    influenza (QUADRIVALENT ADJUVANTED PF) vaccine 0.5 mL vaccine x 1 dose    insulin aspart U-100 pen 0-5 Units QID (AC + HS) PRN    insulin aspart U-100 pen 4 Units TIDWM    insulin detemir U-100 pen 13 Units Daily    levothyroxine tablet 50 mcg Before breakfast    lisinopriL tablet 40 mg Daily    [START ON 2/19/2022] magnesium oxide tablet 400 mg Daily    magnesium oxide tablet 800 mg PRN    magnesium oxide tablet 800 mg PRN    melatonin tablet 6 mg Nightly PRN    meropenem-0.9% sodium chloride 1 g/50 mL IVPB Once    methocarbamoL tablet 750 mg TID PRN    metoprolol tartrate (LOPRESSOR) tablet 25 mg BID    naloxone 0.4 mg/mL injection 0.02 mg PRN    ondansetron disintegrating tablet 8 mg Q8H PRN    oxybutynin 24 hr tablet 10 mg Daily    polyethylene glycol packet 17 g Daily PRN    promethazine tablet 25 mg Q6H PRN    sevelamer carbonate tablet 800 mg TID WM    sodium chloride 0.9% flush 10 mL Q8H PRN    sodium chloride 0.9% flush 3 mL PRN    sulfamethoxazole-trimethoprim 800-160mg per tablet 1 tablet Daily    sumatriptan tablet 100 mg BID PRN    traZODone tablet 100 mg Nightly PRN    venlafaxine 24 hr capsule 150 mg Daily       Objective:     Vital Signs (Most Recent):  Temp: 98.3 °F (36.8 °C) (02/18/22 1517)  Pulse: (!) 56 (02/18/22 1517)  Resp: 19 (02/18/22 1517)  BP: (!) 182/78 (02/18/22 1517)  SpO2: 98 % (02/18/22 1517)  O2 Device  (Oxygen Therapy): room air (02/18/22 1300) Vital Signs (24h Range):  Temp:  [97 °F (36.1 °C)-98.9 °F (37.2 °C)] 98.3 °F (36.8 °C)  Pulse:  [56-80] 56  Resp:  [16-19] 19  SpO2:  [92 %-100 %] 98 %  BP: (117-182)/(60-87) 182/78     Weight: 104.8 kg (231 lb) (02/18/22 1103)  Body mass index is 40.92 kg/m².  Body surface area is 2.16 meters squared.    I/O last 3 completed shifts:  In: 600 [P.O.:600]  Out: 3551 [Urine:3550; Other:1]    Physical Exam  Vitals and nursing note reviewed.   Constitutional:       General: She is not in acute distress.     Appearance: Normal appearance. She is obese. She is not ill-appearing, toxic-appearing or diaphoretic.   Cardiovascular:      Rate and Rhythm: Normal rate and regular rhythm.      Pulses: Normal pulses.      Heart sounds: No murmur heard.       Comments: R IJ Permacath   Pulmonary:      Effort: Pulmonary effort is normal. No respiratory distress.      Breath sounds: No wheezing, rhonchi or rales.   Abdominal:      General: Abdomen is flat. Bowel sounds are normal. There is no distension.      Palpations: Abdomen is soft. There is no mass.      Tenderness: There is no abdominal tenderness. There is no guarding or rebound.   Genitourinary:     Comments: Suprapubic catheter draining blood tinged urine  Musculoskeletal:         General: Swelling present. No tenderness or deformity.      Right lower leg: Edema present.      Left lower leg: Edema present.      Comments: Trace edema in LE   Skin:     General: Skin is warm.      Capillary Refill: Capillary refill takes 2 to 3 seconds.      Coloration: Skin is not jaundiced or pale.      Findings: No bruising, erythema, lesion or rash.   Neurological:      Mental Status: She is alert and oriented to person, place, and time.         Significant Labs:  CBC:   Recent Labs   Lab 02/14/22  0355   WBC 5.76   RBC 3.17*   HGB 9.2*   HCT 30.4*      MCV 96   MCH 29.0   MCHC 30.3*     CMP:   Recent Labs   Lab 02/18/22  0231   *    CALCIUM 8.7   ALBUMIN 1.8*   PROT 5.5*   *   K 5.3*   CO2 20*      BUN 35*   CREATININE 2.1*   ALKPHOS 96   ALT 6*   AST 19   BILITOT 0.2     PTH: No results for input(s): PTH in the last 168 hours.  TSH: No results for input(s): TSH in the last 168 hours.  All labs within the past 24 hours have been reviewed.     Significant Imaging:  Labs: Reviewed    Assessment/Plan:     CKD (chronic kidney disease) requiring chronic dialysis  70 yo F admitted with concern for sepsis due to UTI. UA with 1+ leuks, 28 RBCs, >100 WBCs.   ZAK on CKD IV secondary to septic shock. Dialysis dependent ZAK since 12/15/21      ESRD on IHD MWF  Out patient HD Center - [facility] / Dr. Hurst   Duration of outpatient dialysis session - 3.5 hrs  EDW: 114 kg   Residual Renal Function (Urine Output) -   Access:R CVC    -No emergent need for clearance and volume management today. Pt has excellent urine output (2.5L in 24 hours) and has some renal clearance, will continue to monitor labs closely  -repeat BMP this evening to f/u on K of 5.3 earlier this morning  -CKD anemia: hgb 9.2  -Mineral & bone disease: phos 4.5, PTH 69, will consider holding phos binders if phos drops <3   -Strict Input and Output and chart   -Daily weights.  -Low NA, K, PO4 diet and fluid restriction please (she had bags of cheetos and many beverages on her side table)   -Will evaluate HD requirements Daily    Urinary tract infection due to ESBL Klebsiella  -Plan per primary team       Secondary hypertension  Current BP meds: clonidine 0.2mg tid, lisinopril 40mg daily, metoprolol 25mg bid     Suprapubic catheter  Exchanged 2/18, bloody urine  -Plan per primary team       Uncontrolled type 2 diabetes mellitus with stage 4 chronic kidney disease, with long-term current use of insulin  Lab Results   Component Value Date    HGBA1C 5.4 01/03/2022     -Plan per primary team           Thank you for your consult. I will follow-up with patient. Please contact us if you have  any additional questions.    Rosaline Feliz MD  Nephrology  [facility] - Telemetry Stepdown ([place])

## 2022-02-18 NOTE — ANESTHESIA PROCEDURE NOTES
Intubation    Date/Time: 2/18/2022 11:21 AM  Performed by: Makeda Mead CRNA  Authorized by: Alex Alba Jr., MD     Intubation:     Induction:  Intravenous    Intubated:  Postinduction    Mask Ventilation:  Easy with oral airway    Attempts:  1    Attempted By:  CRNA    Method of Intubation:  Direct    Blade:  Allen 2    Laryngeal View Grade: Grade I - full view of cords      Difficult Airway Encountered?: No      Complications:  None    Airway Device:  Oral endotracheal tube    Airway Device Size:  7.0    Style/Cuff Inflation:  Cuffed    Inflation Amount (mL):  6    Tube secured:  19    Secured at:  The lips    Placement Verified By:  Capnometry    Complicating Factors:  Obesity    Findings Post-Intubation:  BS equal bilateral and atraumatic/condition of teeth unchanged

## 2022-02-18 NOTE — SUBJECTIVE & OBJECTIVE
Interval History:   No acute events overnight. 's-180's in the last 24 hours.   Pt underwent the following procedures today:   L ureteroscopy, Cystoscopy, Left ureteral biopsy, Left JJ ureteral stent exchange, Left retrograde pyelogram, Suprapubic catheter exchanged    Last iHD 2/12 net 2.5L    Pt states feeling well, has no complaints.     Review of patient's allergies indicates:  No Known Allergies  Current Facility-Administered Medications   Medication Frequency    0.9%  NaCl infusion Continuous    acetaminophen tablet 650 mg Q8H PRN    anastrozole tablet 1 mg Daily    apixaban tablet 2.5 mg BID    atorvastatin tablet 80 mg Daily    bisacodyL suppository 10 mg Daily PRN    cloNIDine tablet 0.2 mg Q8H PRN    cloNIDine tablet 0.2 mg TID    dextrose 10% bolus 125 mL PRN    dextrose 10% bolus 250 mL PRN    famotidine tablet 20 mg Daily    glucagon (human recombinant) injection 1 mg PRN    glucose chewable tablet 16 g PRN    glucose chewable tablet 24 g PRN    heparin (porcine) injection 1,000 Units PRN    HYDROcodone-acetaminophen  mg per tablet 1 tablet Q6H PRN    influenza (QUADRIVALENT ADJUVANTED PF) vaccine 0.5 mL vaccine x 1 dose    insulin aspart U-100 pen 0-5 Units QID (AC + HS) PRN    insulin aspart U-100 pen 4 Units TIDWM    insulin detemir U-100 pen 13 Units Daily    levothyroxine tablet 50 mcg Before breakfast    lisinopriL tablet 40 mg Daily    [START ON 2/19/2022] magnesium oxide tablet 400 mg Daily    magnesium oxide tablet 800 mg PRN    magnesium oxide tablet 800 mg PRN    melatonin tablet 6 mg Nightly PRN    meropenem-0.9% sodium chloride 1 g/50 mL IVPB Once    methocarbamoL tablet 750 mg TID PRN    metoprolol tartrate (LOPRESSOR) tablet 25 mg BID    naloxone 0.4 mg/mL injection 0.02 mg PRN    ondansetron disintegrating tablet 8 mg Q8H PRN    oxybutynin 24 hr tablet 10 mg Daily    polyethylene glycol packet 17 g Daily PRN    promethazine tablet 25 mg Q6H  PRN    sevelamer carbonate tablet 800 mg TID WM    sodium chloride 0.9% flush 10 mL Q8H PRN    sodium chloride 0.9% flush 3 mL PRN    sulfamethoxazole-trimethoprim 800-160mg per tablet 1 tablet Daily    sumatriptan tablet 100 mg BID PRN    traZODone tablet 100 mg Nightly PRN    venlafaxine 24 hr capsule 150 mg Daily       Objective:     Vital Signs (Most Recent):  Temp: 98.3 °F (36.8 °C) (02/18/22 1517)  Pulse: (!) 56 (02/18/22 1517)  Resp: 19 (02/18/22 1517)  BP: (!) 182/78 (02/18/22 1517)  SpO2: 98 % (02/18/22 1517)  O2 Device (Oxygen Therapy): room air (02/18/22 1300) Vital Signs (24h Range):  Temp:  [97 °F (36.1 °C)-98.9 °F (37.2 °C)] 98.3 °F (36.8 °C)  Pulse:  [56-80] 56  Resp:  [16-19] 19  SpO2:  [92 %-100 %] 98 %  BP: (117-182)/(60-87) 182/78     Weight: 104.8 kg (231 lb) (02/18/22 1103)  Body mass index is 40.92 kg/m².  Body surface area is 2.16 meters squared.    I/O last 3 completed shifts:  In: 600 [P.O.:600]  Out: 3551 [Urine:3550; Other:1]    Physical Exam  Vitals and nursing note reviewed.   Constitutional:       General: She is not in acute distress.     Appearance: Normal appearance. She is obese. She is not ill-appearing, toxic-appearing or diaphoretic.   Cardiovascular:      Rate and Rhythm: Normal rate and regular rhythm.      Pulses: Normal pulses.      Heart sounds: No murmur heard.       Comments: R IJ Permacath   Pulmonary:      Effort: Pulmonary effort is normal. No respiratory distress.      Breath sounds: No wheezing, rhonchi or rales.   Abdominal:      General: Abdomen is flat. Bowel sounds are normal. There is no distension.      Palpations: Abdomen is soft. There is no mass.      Tenderness: There is no abdominal tenderness. There is no guarding or rebound.   Genitourinary:     Comments: Suprapubic catheter draining blood tinged urine  Musculoskeletal:         General: Swelling present. No tenderness or deformity.      Right lower leg: Edema present.      Left lower leg: Edema  present.      Comments: Trace edema in LE   Skin:     General: Skin is warm.      Capillary Refill: Capillary refill takes 2 to 3 seconds.      Coloration: Skin is not jaundiced or pale.      Findings: No bruising, erythema, lesion or rash.   Neurological:      Mental Status: She is alert and oriented to person, place, and time.         Significant Labs:  CBC:   Recent Labs   Lab 02/14/22  0355   WBC 5.76   RBC 3.17*   HGB 9.2*   HCT 30.4*      MCV 96   MCH 29.0   MCHC 30.3*     CMP:   Recent Labs   Lab 02/18/22  0231   *   CALCIUM 8.7   ALBUMIN 1.8*   PROT 5.5*   *   K 5.3*   CO2 20*      BUN 35*   CREATININE 2.1*   ALKPHOS 96   ALT 6*   AST 19   BILITOT 0.2     PTH: No results for input(s): PTH in the last 168 hours.  TSH: No results for input(s): TSH in the last 168 hours.  All labs within the past 24 hours have been reviewed.     Significant Imaging:  Labs: Reviewed

## 2022-02-18 NOTE — NURSING TRANSFER
Nursing Transfer Note      2/18/2022     Reason patient is being transferred: post op recovery complete    Transfer To: [id number]    Transfer via stretcher    Transfer with chart    Transported by transporter    Chart send with patient: Yes

## 2022-02-18 NOTE — SUBJECTIVE & OBJECTIVE
Interval History: AF. HDS. Urine output adequate. OR today    Review of Systems    Objective:     Temp:  [97 °F (36.1 °C)-98.3 °F (36.8 °C)] 97 °F (36.1 °C)  Pulse:  [60-71] 71  Resp:  [16-20] 18  SpO2:  [92 %-100 %] 92 %  BP: (117-189)/(62-76) 117/62     Body mass index is 41.08 kg/m².           Drains     Drain                 Hemodialysis Catheter right internal jugular -- days         Hemodialysis Catheter right subclavian -- days         Suprapubic Catheter 01/23/22 1530 16 Fr. 24 days                Physical Exam  Constitutional:       Appearance: She is obese.   HENT:      Head: Normocephalic.   Eyes:      Pupils: Pupils are equal, round, and reactive to light.   Cardiovascular:      Rate and Rhythm: Normal rate.   Pulmonary:      Effort: Pulmonary effort is normal.   Abdominal:      Palpations: Abdomen is soft.   Genitourinary:     Comments: [place]  Musculoskeletal:         General: Normal range of motion.      Cervical back: Normal range of motion.   Neurological:      General: No focal deficit present.      Mental Status: She is alert.   Psychiatric:         Mood and Affect: Mood normal.         Significant Labs:    BMP:  Recent Labs   Lab 02/16/22  0358 02/17/22  0333 02/18/22  0231   * 132* 130*   K 4.6 4.6 5.3*    100 102   CO2 22* 21* 20*   BUN 30* 34* 35*   CREATININE 2.1* 2.1* 2.1*   CALCIUM 9.3 9.1 8.7       CBC:   Recent Labs   Lab 02/12/22  0404 02/13/22  0514 02/14/22  0355   WBC 6.51 5.58 5.76   HGB 8.1* 9.2* 9.2*   HCT 26.4* 29.7* 30.4*    347 332       All pertinent labs results from the past 24 hours have been reviewed.    Significant Imaging:  All pertinent imaging results/findings from the past 24 hours have been reviewed.

## 2022-02-18 NOTE — ASSESSMENT & PLAN NOTE
- SIRS: 0/4; hypotension  - Hx of ESBL UTI; completed course of meropenem  - UA with 1+ leuks, 28 RBCs, >100 WBCs  - UCx w/GNR >100,000- susceptibilities to connie and bactrim- continue antibiotics leading up to ureteroscopy and dc post-op- stop connie - transition to Bactrim DS qday until sat- 02/19  - Given Vanc in ED   - suprapubic catheter changed in the ED

## 2022-02-18 NOTE — ASSESSMENT & PLAN NOTE
- h/o left ureteral stone and indwelling left ureteral stent  - stent placed 06/30/21- pt lost to fu- stent exchanged 12/23/21  - Urology following: plan for ureteroscopy w/laser lithotripsy vs. Stone basket extraction vs. Stent replacement 02/18/22

## 2022-02-19 LAB
ALBUMIN SERPL BCP-MCNC: 1.9 G/DL (ref 3.5–5.2)
ALP SERPL-CCNC: 108 U/L (ref 55–135)
ALT SERPL W/O P-5'-P-CCNC: 7 U/L (ref 10–44)
ANION GAP SERPL CALC-SCNC: 7 MMOL/L (ref 8–16)
AST SERPL-CCNC: 11 U/L (ref 10–40)
BILIRUB SERPL-MCNC: 0.2 MG/DL (ref 0.1–1)
BUN SERPL-MCNC: 35 MG/DL (ref 8–23)
CALCIUM SERPL-MCNC: 8.5 MG/DL (ref 8.7–10.5)
CHLORIDE SERPL-SCNC: 99 MMOL/L (ref 95–110)
CO2 SERPL-SCNC: 21 MMOL/L (ref 23–29)
CREAT SERPL-MCNC: 2.2 MG/DL (ref 0.5–1.4)
EST. GFR  (AFRICAN AMERICAN): 25.6 ML/MIN/1.73 M^2
EST. GFR  (NON AFRICAN AMERICAN): 22.2 ML/MIN/1.73 M^2
GLUCOSE SERPL-MCNC: 139 MG/DL (ref 70–110)
POCT GLUCOSE: 111 MG/DL (ref 70–110)
POCT GLUCOSE: 123 MG/DL (ref 70–110)
POCT GLUCOSE: 152 MG/DL (ref 70–110)
POTASSIUM SERPL-SCNC: 5.5 MMOL/L (ref 3.5–5.1)
PROT SERPL-MCNC: 5.3 G/DL (ref 6–8.4)
SODIUM SERPL-SCNC: 127 MMOL/L (ref 136–145)

## 2022-02-19 PROCEDURE — 99233 SBSQ HOSP IP/OBS HIGH 50: CPT | Mod: HCNC,95,, | Performed by: INTERNAL MEDICINE

## 2022-02-19 PROCEDURE — 25000003 PHARM REV CODE 250: Mod: HCNC | Performed by: PHYSICIAN ASSISTANT

## 2022-02-19 PROCEDURE — 36415 COLL VENOUS BLD VENIPUNCTURE: CPT | Mod: HCNC | Performed by: STUDENT IN AN ORGANIZED HEALTH CARE EDUCATION/TRAINING PROGRAM

## 2022-02-19 PROCEDURE — 25000003 PHARM REV CODE 250: Mod: HCNC | Performed by: STUDENT IN AN ORGANIZED HEALTH CARE EDUCATION/TRAINING PROGRAM

## 2022-02-19 PROCEDURE — 94760 N-INVAS EAR/PLS OXIMETRY 1: CPT | Mod: HCNC

## 2022-02-19 PROCEDURE — 11000001 HC ACUTE MED/SURG PRIVATE ROOM: Mod: HCNC

## 2022-02-19 PROCEDURE — 80053 COMPREHEN METABOLIC PANEL: CPT | Mod: HCNC | Performed by: STUDENT IN AN ORGANIZED HEALTH CARE EDUCATION/TRAINING PROGRAM

## 2022-02-19 PROCEDURE — 99233 PR SUBSEQUENT HOSPITAL CARE,LEVL III: ICD-10-PCS | Mod: HCNC,95,, | Performed by: INTERNAL MEDICINE

## 2022-02-19 PROCEDURE — 25000003 PHARM REV CODE 250: Mod: HCNC | Performed by: INTERNAL MEDICINE

## 2022-02-19 PROCEDURE — 94761 N-INVAS EAR/PLS OXIMETRY MLT: CPT | Mod: HCNC

## 2022-02-19 RX ORDER — SODIUM CHLORIDE 9 MG/ML
INJECTION, SOLUTION INTRAVENOUS CONTINUOUS
Status: ACTIVE | OUTPATIENT
Start: 2022-02-19 | End: 2022-02-20

## 2022-02-19 RX ADMIN — CLONIDINE HYDROCHLORIDE 0.2 MG: 0.2 TABLET ORAL at 08:02

## 2022-02-19 RX ADMIN — APIXABAN 2.5 MG: 2.5 TABLET, FILM COATED ORAL at 08:02

## 2022-02-19 RX ADMIN — METOPROLOL TARTRATE 25 MG: 25 TABLET, FILM COATED ORAL at 08:02

## 2022-02-19 RX ADMIN — TRAZODONE HYDROCHLORIDE 100 MG: 100 TABLET ORAL at 08:02

## 2022-02-19 RX ADMIN — METHOCARBAMOL 750 MG: 750 TABLET ORAL at 08:02

## 2022-02-19 RX ADMIN — SODIUM ZIRCONIUM CYCLOSILICATE 10 G: 10 POWDER, FOR SUSPENSION ORAL at 03:02

## 2022-02-19 RX ADMIN — VENLAFAXINE HYDROCHLORIDE 150 MG: 150 CAPSULE, EXTENDED RELEASE ORAL at 08:02

## 2022-02-19 RX ADMIN — SEVELAMER CARBONATE 800 MG: 800 TABLET, FILM COATED ORAL at 12:02

## 2022-02-19 RX ADMIN — OXYBUTYNIN CHLORIDE 10 MG: 10 TABLET, EXTENDED RELEASE ORAL at 08:02

## 2022-02-19 RX ADMIN — Medication 400 MG: at 08:02

## 2022-02-19 RX ADMIN — INSULIN ASPART 4 UNITS: 100 INJECTION, SOLUTION INTRAVENOUS; SUBCUTANEOUS at 08:02

## 2022-02-19 RX ADMIN — ATORVASTATIN CALCIUM 80 MG: 40 TABLET, FILM COATED ORAL at 08:02

## 2022-02-19 RX ADMIN — ANASTROZOLE 1 MG: 1 TABLET, COATED ORAL at 08:02

## 2022-02-19 RX ADMIN — SODIUM CHLORIDE: 0.9 INJECTION, SOLUTION INTRAVENOUS at 09:02

## 2022-02-19 RX ADMIN — FAMOTIDINE 20 MG: 20 TABLET ORAL at 08:02

## 2022-02-19 RX ADMIN — INSULIN DETEMIR 13 UNITS: 100 INJECTION, SOLUTION SUBCUTANEOUS at 08:02

## 2022-02-19 RX ADMIN — HYDROCODONE BITARTRATE AND ACETAMINOPHEN 1 TABLET: 10; 325 TABLET ORAL at 10:02

## 2022-02-19 RX ADMIN — INSULIN ASPART 4 UNITS: 100 INJECTION, SOLUTION INTRAVENOUS; SUBCUTANEOUS at 05:02

## 2022-02-19 RX ADMIN — SEVELAMER CARBONATE 800 MG: 800 TABLET, FILM COATED ORAL at 08:02

## 2022-02-19 RX ADMIN — SULFAMETHOXAZOLE AND TRIMETHOPRIM 1 TABLET: 800; 160 TABLET ORAL at 08:02

## 2022-02-19 RX ADMIN — SODIUM ZIRCONIUM CYCLOSILICATE 10 G: 10 POWDER, FOR SUSPENSION ORAL at 08:02

## 2022-02-19 RX ADMIN — LEVOTHYROXINE SODIUM 50 MCG: 50 TABLET ORAL at 05:02

## 2022-02-19 RX ADMIN — METHOCARBAMOL 750 MG: 750 TABLET ORAL at 10:02

## 2022-02-19 RX ADMIN — SODIUM ZIRCONIUM CYCLOSILICATE 10 G: 10 POWDER, FOR SUSPENSION ORAL at 09:02

## 2022-02-19 RX ADMIN — INSULIN ASPART 4 UNITS: 100 INJECTION, SOLUTION INTRAVENOUS; SUBCUTANEOUS at 12:02

## 2022-02-19 RX ADMIN — SEVELAMER CARBONATE 800 MG: 800 TABLET, FILM COATED ORAL at 05:02

## 2022-02-19 RX ADMIN — LISINOPRIL 40 MG: 20 TABLET ORAL at 08:02

## 2022-02-19 RX ADMIN — SUMATRIPTAN SUCCINATE 100 MG: 50 TABLET ORAL at 06:02

## 2022-02-19 RX ADMIN — CLONIDINE HYDROCHLORIDE 0.2 MG: 0.2 TABLET ORAL at 03:02

## 2022-02-19 RX ADMIN — HYDROCODONE BITARTRATE AND ACETAMINOPHEN 1 TABLET: 10; 325 TABLET ORAL at 03:02

## 2022-02-19 NOTE — PROGRESS NOTES
[facility] - Telemetry [facility] ([address] Medicine  Telemedicine Progress Note    Patient Name: Cecile Bowen  MRN: [id number]  Patient Class: IP- Inpatient   Admission Date: 2/11/2022  Length of Stay: 4 days  Attending Physician: Liana Ruiz MD  Primary Care Provider: Kailey Navarro MD    Subjective:     Principal Problem:Acute cystitis with hematuria    HPI:  Cecile Bowen is a 71 y.o. female with PMHx significant for HTN, HLD, hx of ESBL UTI, ESRD on HD admitted to observation for hypotension, found to have a UTI. Patient was at her dialysis clinic today when she was found to have a BP of 76/43 and advised to come to the ED for evaluation. Reports her SBPs usually run in the 120s. Endorses cloudy urine in her catheter bag for the past few days, and reports it was last changed about a week ago. Denies lightheadedness, dizziness, SOB, fatigue, weakness, HA, CP, cough, abdominal pain, n/v. Patient was recently hospitalized for a ESBL UTI for which she completed her course of meropenem. Of note, patient also has a right subclavian dialysis access that was also changed about a week ago.     In the ED, BP 94/24 improved to 102/57. Remaining VSS. WBC 8.06. Lactic 1.8. Procal 0.12. Hgb 8.5 (~BL). . Cr 2.3 (~BL). Glucose 224. UA with 1+ leuks, 28 RBCs, >100 WBCs. CXR with no acute processes. Given Vanc 2g x 1.       Overview/Hospital Course:  Admitted to  for possible UTI. Cultures obtained, suprapubic catheter changes apparently in the ED, and started Abx. Nephrology consulted for HD. Pt continued on broad antibiotics w/improvement in signs and symptoms. Final culture results w/klebsiella pneumonia- susceptible to connie and bactrim. Urology evaluated patient, 02/14. Pt w/h/o Lt ureteral stone- s/p stent placement 06/2021 and replacement 12/2021- intermittently lost to follow-up. Planning for ureteroscopy 02/18 w/definitive stone management and stent removal as patient has high  likelihood to continue to be lost to f/u. Agreed that it was in the best interest of the patient to remain inpatient to undergo further interventions/evaluations via urology. Pt was transitioned to bactrim 02/16- connie stopped.      Telemedicine  This service was provided by Virtual Visit.    Patient was transferred to [facility] on:  2/17/22     Chief Complaint   Patient presents with    Hypotension     [facility] center with c/o hypotension SBP 80s, sent for possible urosepsis, recent UTI diagnosis, did not receive dialysis today, last dialyzed wednesday       The patient location is: [address] [place]*   Admitted 2/11/2022  8:18 AM  Present with the patient at the time of the telemed/virtual assessment: Telepresenter    Interval History / Events Overnight:   The patient is able to provide adequate history. Additional history was obtained from past medical records. No significant events reported by Nursing. Had Cysto today.  Patient complains of nothing specific. Symptoms have improved since yesterday. Associated symptoms include: fatigue. Symptoms are decreasing in severity.     Lab test(s) reviewed: Hyperkalemia  Discussed case with Nephrology (another health care provider)    Review of Systems   Constitutional: Negative for fever.   Respiratory: Negative for shortness of breath.      Objective:     Vital Signs (Most Recent):  Temp: 97.5 °F (36.4 °C) (02/18/22 1300)  Pulse: 63 (02/18/22 1300)  Resp: 16 (02/18/22 1300)  BP: 124/60 (02/18/22 1300)  SpO2: 97 % (02/18/22 1300) Vital Signs (24h Range):  Temp:  [97 °F (36.1 °C)-98.9 °F (37.2 °C)] 97.5 °F (36.4 °C)  Pulse:  [63-80] 63  Resp:  [16-20] 16  SpO2:  [92 %-100 %] 97 %  BP: (117-189)/(60-87) 124/60     Weight: 104.8 kg (231 lb)  Body mass index is 40.92 kg/m².    Intake/Output Summary (Last 24 hours) at 2/18/2022 1340  Last data filed at 2/18/2022 1208  Gross per 24 hour   Intake 405 ml   Output 1501 ml   Net  -1096 ml      Physical Exam  Constitutional:       General: She is not in acute distress.     Appearance: Normal appearance. She is not diaphoretic.   Eyes:      General: Lids are normal. No scleral icterus.        Right eye: No discharge.         Left eye: No discharge.      Conjunctiva/sclera: Conjunctivae normal.   Cardiovascular:      Rate and Rhythm: Normal rate.   Pulmonary:      Effort: Pulmonary effort is normal. No tachypnea, accessory muscle usage or respiratory distress.   Abdominal:      General: There is no distension.   Skin:     Coloration: Skin is not cyanotic.   Neurological:      Mental Status: She is alert. She is not disoriented.   Psychiatric:         Attention and Perception: Attention normal.         Mood and Affect: Affect normal.         Behavior: Behavior is cooperative.         Significant Labs:     Recent Labs   Lab 09/05/21  2139 12/21/21  0803 01/03/22  0000   HGBA1C 6.6* 5.3 5.4   :   Recent Labs   Lab 02/17/22  1911 02/18/22  0806 02/18/22  1236   POCTGLUCOSE 188* 146* 158*     Recent Labs   Lab 02/12/22  0404 02/13/22  0514 02/14/22  0355   WBC 6.51 5.58 5.76   HGB 8.1* 9.2* 9.2*   HCT 26.4* 29.7* 30.4*    347 332     Recent Labs   Lab 02/12/22  0404 02/12/22  0404 02/13/22  0514 02/14/22  0355   GRAN 53.1  3.5   < > 43.2  2.4 43.5  2.5   LYMPH 26.3  1.7   < > 33.2  1.9 32.5  1.9   MONO 11.7  0.8   < > 14.9  0.8 14.1  0.8   EOS 0.5  --  0.4 0.5    < > = values in this interval not displayed.     Recent Labs   Lab 02/12/22  0404 02/14/22  0355 02/15/22  0818 02/15/22  0818 02/16/22  0358 02/17/22  0333 02/18/22  0231   *   < > 131*   < > 133* 132* 130*   K 3.9   < > 4.5   < > 4.6 4.6 5.3*   CL 97   < > 100   < > 100 100 102   CO2 24   < > 25   < > 22* 21* 20*   BUN 37*   < > 27*   < > 30* 34* 35*   CREATININE 2.2*   < > 2.0*   < > 2.1* 2.1* 2.1*      < > 151*   < > 138* 143* 131*   CALCIUM 9.1   < > 9.5   < > 9.3 9.1 8.7   ALBUMIN  --   --  2.0*  --   --   2.0* 1.8*   MG  --   --   --   --   --  2.0  --    PHOS 5.0*  --  4.4  --   --  4.5  --     < > = values in this interval not displayed.     Recent Labs   Lab 11/1950 11/29/21  0413 12/06/21  0758 01/03/22  0000 01/23/22  0332 02/09/22  0000 02/11/22  0849 02/11/22  1304   PROCAL  --   --  0.69*  --   --   --  0.12  --    LACTATE 0.8  --   --   --   --   --  1.8 1.4   FERRITIN  --    < >  --  353* 812* 470*  --   --     < > = values in this interval not displayed.     Results for orders placed or performed in visit on 02/09/22   Vitamin D   Result Value Ref Range    Vit D, 25-Hydroxy 58.8 30.0 - 100.0 ng/mL     SARS-CoV2 (COVID-19) Qualitative PCR (no units)   Date Value   02/15/2022 Not Detected   12/28/2021 Not Detected   12/21/2021 Not Detected   09/05/2021 Not Detected     POC Rapid COVID (no units)   Date Value   02/11/2022 Negative   01/21/2022 Negative   01/20/2022 Negative   11/28/2021 Negative   09/28/2021 Negative   09/05/2021 Negative   05/14/2021 Negative       ECG Results          EKG 12-lead (Final result)  Result time 02/12/22 09:41:30    Final result by Interface, Lab In [facility] (02/12/22 09:41:30)                 Narrative:    Test Reason :     Vent. Rate : 057 BPM     Atrial Rate : 059 BPM     P-R Int : 184 ms          QRS Dur : 080 ms      QT Int : 426 ms       P-R-T Axes : -40 -13 -15 degrees     QTc Int : 415 ms    Sinus bradycardia  Voltage criteria for left ventricular hypertrophy  Lateral infarct (cited on or before 11-FEB-2022)  Abnormal ECG  When compared with ECG of 11-FEB-2022 08:39,  No significant change was found  Confirmed by SUN DANIEL, HOMEYAR ([id number]) on 2/12/2022 9:41:21 AM    Referred By: [email or web address]   SELF           Confirmed By:KEVYN GARSIA MD                             EKG 12-lead (Final result)  Result time 02/12/22 09:39:20    Final result by Interface, Lab In [facility] (02/12/22 09:39:20)                 Narrative:    Test Reason : I95.9,    Vent. Rate : 066 BPM      Atrial Rate : 068 BPM     P-R Int : 000 ms          QRS Dur : 072 ms      QT Int : 402 ms       P-R-T Axes : 000 -15 -08 degrees     QTc Int : 422 ms    Technically poor ECG tracing  Sinus rhythm  Voltage criteria for left ventricular hypertrophy  Lateral infarct ,age undetermined  Abnormal ECG  When compared with ECG of 21-JAN-2022 12:55,  Questionable change in in R wave progression  Lateral infarct is now Present  T wave inversion now evident in Inferior leads  Confirmed by KEVYN GARSIA MD ([id number]) on 2/12/2022 9:39:06 AM    Referred By: ELVA   SELF           Confirmed By:KEVYN GARSIA MD                              Results for orders placed during the hospital encounter of 11/28/21    Echo    Interpretation Summary  · The left ventricle is normal in size with normal systolic function. The estimated ejection fraction is 60%.  · Normal right ventricular size with normal right ventricular systolic function.  · Normal left ventricular diastolic function.  · Mechanically ventilated; cannot use inferior caval vein diameter to estimate central venous pressure.      SURG FL Surgery Fluoro Usage  See OP Notes for results.     IMPRESSION: See OP Notes for results.     This procedure was auto-finalized by: Virtual Radiologist      Labs and Imaging within the last 24 hours listed above were reviewed.       Diet: Diet renal [facility]; Fluid - 1500mL  Significant LDAs:   IV Access Type: Dialysis Access  Urinary Catheter Indication if present: Patient Does Not Have Urinary Catheter  Other Lines/Tubes/Drains: SPC    HIGH RISK CONDITION(S):   Patient has an abrupt change in neurologic status: Weakness     Goals of Care:    Previous admission:  1/21/22  Likely prognosis:  Fair  Code Status: Full Code  Comfort Only: No  Hospice: No  Goals at discharge: remain at home, with physician follow-up    Discharge Planning   FLORENTINO: 2/19/2022     Code Status: Full Code   Is the patient medically ready for discharge?: No     Reason for patient still in hospital (select all that apply): Patient trending condition and Pending disposition  Discharge Plan A: Home Health          Assessment/Plan:      * Acute cystitis with hematuria  - SIRS: 0/4; hypotension  - Hx of ESBL UTI; completed course of meropenem  - UA with 1+ leuks, 28 RBCs, >100 WBCs  - UCx w/GNR >100,000- susceptibilities to connie and bactrim- continue antibiotics leading up to ureteroscopy and dc post-op- stop connie - transition to Bactrim DS qday until sat- 02/19  - Given Vanc in ED   - suprapubic catheter changed in the ED, and most recently in OR 2/18    CKD (chronic kidney disease) requiring chronic dialysis  - HD MWF  - Nephrology following  - continue sevelamer carbonate 800mg TIDW  - improving  - IV hydration following Cystoscope 2/18.    Pressure injury of right buttock, stage 3  - present on admission  - wound care consulted, appreciate recs  - turn patient q2h    Urinary tract infection due to ESBL Klebsiella  - hx of    Secondary hypertension  - Continue lopressor, lisinopril and clonidine  - Holding Lasix   - Titrate BP meds accordingly    A-fib  - continue Eliquis 2.5mg BID  - continue Lopressor 25mg BID     Left ureteral stone  - h/o left ureteral stone and indwelling left ureteral stent  - stent placed 06/30/21- pt lost to fu- stent exchanged 12/23/21  - Urology following: plan for ureteroscopy w/laser lithotripsy vs. Stone basket extraction vs. Stent replacement 02/18/22  - Per Urology on 2/18:  Left ureteroscopy, Cystoscopy,  Left ureteral biopsy,  Left JJ ureteral stent exchange,  Left retrograde pyelogram. Patient to remove stent on strings on Monday morning.    Malignant neoplasm of left breast, estrogen receptor positive  - continue home anastrozole 1mg daily    Chronic pain  - follows w/pain specialist  - Improved back pain   - continue Norco 10mg q6h for moderate pain    Suprapubic catheter  - exchanged Q month    Uncontrolled type 2 diabetes mellitus with  stage 4 chronic kidney disease, with long-term current use of insulin  - uncontrolled on admit  - continued Levemir 13 units daily, Novolog 4 units TIDW, SSI  - up titrate as needed    Mixed migraine and muscle contraction headache  - stable  - home Fioricet and sumatriptan prn    Neurogenic bladder  - noted, has suprapubic catheter   - SPC changed in OR 2/18    Urinary retention  - continue home oxybutynin 10mg daily    Recurrent urinary tract infection  - see above    Hyperlipidemia associated with type 2 diabetes mellitus  - continue Lipitor 80mg daily    Hypothyroidism  - continue home Synthroid 50mcg      Active Hospital Problems    Diagnosis  POA    *Acute cystitis with hematuria [N30.01]  Yes     L ureteroscopy, Cystoscopy, Left ureteral biopsy, Left JJ ureteral stent exchange, Left retrograde pyelogram, Suprapubic catheter exchanged  Antibiotics per ID    -Plan per primary team         CKD (chronic kidney disease) requiring chronic dialysis [N18.6, Z99.2]  Not Applicable    Pressure injury of right buttock, stage 3 [L89.313]  Yes    Urinary tract infection due to ESBL Klebsiella [N39.0, B96.89]  Yes    Secondary hypertension [I15.9]  Yes    A-fib [I48.91]  Yes     Chronic    Left ureteral stone [N20.1]  Yes    Malignant neoplasm of left breast, estrogen receptor positive [C50.912, Z17.0]  Not Applicable     Chronic    Chronic pain [G89.29]  Yes     Chronic    Suprapubic catheter [Z93.59]  Not Applicable     Chronic    Uncontrolled type 2 diabetes mellitus with stage 4 chronic kidney disease, with long-term current use of insulin [E11.22, E11.65, N18.4, Z79.4]  Not Applicable    Mixed migraine and muscle contraction headache [G43.909, G44.209]  Yes    Neurogenic bladder [N31.9]  Yes     Chronic    Urinary retention [R33.9]  Yes    Recurrent urinary tract infection [N39.0]  Yes    Hyperlipidemia associated with type 2 diabetes mellitus [E11.69, E78.5]  Yes     Chronic    Hypothyroidism [E03.9]   Yes     Chronic      Resolved Hospital Problems   No resolved problems to display.       Inpatient Medications Prescribed for Management of Current Problems:     Scheduled Meds:    anastrozole  1 mg Oral Daily    apixaban  2.5 mg Oral BID    atorvastatin  80 mg Oral Daily    cloNIDine  0.2 mg Oral TID    famotidine  20 mg Oral Daily    insulin aspart U-100  4 Units Subcutaneous TIDWM    insulin detemir U-100  13 Units Subcutaneous Daily    levothyroxine  50 mcg Oral Before breakfast    lisinopriL  40 mg Oral Daily    [START ON 2/19/2022] magnesium oxide  400 mg Oral Daily    meropenem (MERREM) IVPB  1 g Intravenous Once    metoprolol tartrate  25 mg Oral BID    oxybutynin  10 mg Oral Daily    sevelamer carbonate  800 mg Oral TID WM    sulfamethoxazole-trimethoprim 800-160mg  1 tablet Oral Daily    venlafaxine  150 mg Oral Daily     Continuous Infusions:    sodium chloride 0.9% 50 mL/hr at 02/18/22 1649     As Needed: acetaminophen, bisacodyL, cloNIDine, dextrose 10%, dextrose 10%, glucagon (human recombinant), glucose, glucose, heparin (porcine), HYDROcodone-acetaminophen, influenza, insulin aspart U-100, magnesium oxide, magnesium oxide, melatonin, methocarbamoL, naloxone, ondansetron, polyethylene glycol, promethazine, sodium chloride 0.9%, sodium chloride 0.9%, sumatriptan, traZODone    VTE Risk Mitigation (From admission, onward)         Ordered     heparin (porcine) injection 1,000 Units  As needed (PRN)         02/12/22 1104     apixaban tablet 2.5 mg  2 times daily         02/11/22 1342     IP VTE HIGH RISK PATIENT  Once         02/11/22 1132     Place sequential compression device  Until discontinued         02/11/22 1132              I have assessed these finding virtually using telemed platform and with assistance of bedside nurse     The attending portion of this evaluation, treatment, and documentation was performed per Liana Ruiz MD via Telemedicine AudioVisual using the secure  iMega software platform with 2 way audio/video. The provider was located off-site and the patient is located in the hospital. The aforementioned video software was utilized to document the relevant history and physical exam    Liana Ruiz MD  Department of Hospital Medicine   [facility] - Telemetry Stepdown ([place])

## 2022-02-19 NOTE — ASSESSMENT & PLAN NOTE
- HD MWF  - Nephrology following  - continue sevelamer carbonate 800mg TIDW  - improving  - IV hydration following Cystoscope 2/18.

## 2022-02-19 NOTE — PROGRESS NOTES
Renal function remains stable. UOP adequate 2.2 L/24 hrs. sCr stable at 2.2.  Potassium 5.3 > 5.5.    No emergent need for RRT at this time  Recommend Lokelma TID PO for next 24-48 hrs  Recommend fluid restrictions 0.8-1 L       D. MUNA Porter, APRN, FNP-C  Department of Nephrology  [facility]  Pager: [phone]

## 2022-02-19 NOTE — PLAN OF CARE
"  Problem: Adult Inpatient Plan of Care  Goal: Plan of Care Review  Outcome: Ongoing, Progressing     Problem: Adult Inpatient Plan of Care  Goal: Optimal Comfort and Wellbeing  Outcome: Ongoing, Progressing     Problem: Diabetes Comorbidity  Goal: Blood Glucose Level Within Targeted Range  Outcome: Ongoing, Progressing     Pt. AAOx4, VSS, no falls or injuries during shift, safety maintained. Call light in reach, bed locked and in lowest position, side rails up x2. No acute events, blood glucose monitored and controlled. Weight-shifting throughout shift, suprapubic catheter in place with "cranberry" color urine in drainage bag, wound care completed to sacrum. will continue to monitor.  "

## 2022-02-19 NOTE — PLAN OF CARE
Problem: Infection (Hemodialysis)  Goal: Absence of Infection Signs and Symptoms  Outcome: Ongoing, Progressing     Problem: Fluid and Electrolyte Imbalance (Acute Kidney Injury/Impairment)  Goal: Fluid and Electrolyte Balance  Outcome: Ongoing, Progressing     Problem: Renal Function Impairment (Acute Kidney Injury/Impairment)  Goal: Effective Renal Function  Outcome: Ongoing, Progressing     Problem: Infection  Goal: Absence of Infection Signs and Symptoms  Outcome: Ongoing, Progressing     Problem: Impaired Wound Healing  Goal: Optimal Wound Healing  Outcome: Ongoing, Progressing     Problem: Oral Intake Inadequate (Acute Kidney Injury/Impairment)  Goal: Optimal Nutrition Intake  Outcome: Met  Patient Aox4, remains on RA. C/o back pain at the beginning of shift, norco & robaxin given. SP catheter in place; dressing clean, dry, intact. Urine dark red, 1700 mL output . HS glucose WNL, no insulin coverage required. Wound care performed to sacrum this morning. All needs met overnight.

## 2022-02-19 NOTE — ASSESSMENT & PLAN NOTE
- h/o left ureteral stone and indwelling left ureteral stent  - stent placed 06/30/21- pt lost to fu- stent exchanged 12/23/21  - Urology following: plan for ureteroscopy w/laser lithotripsy vs. Stone basket extraction vs. Stent replacement 02/18/22  - Per Urology on 2/18:  Left ureteroscopy, Cystoscopy,  Left ureteral biopsy,  Left JJ ureteral stent exchange,  Left retrograde pyelogram. Patient to remove stent on strings on Monday morning.

## 2022-02-19 NOTE — PROGRESS NOTES
Urology Progress Note    Patient underwent left ureteroscopy today 2/18/22, a left sided JJ ureteral stent on strings was placed. Doing well post op.     Recommend removing left sided stent on strings on Monday morning, OK for nursing to remove (2/21/22).    Please call if questions.    Urology will sign off at this time    Anders Crane MD  Urology [facility]  [phone]

## 2022-02-19 NOTE — ASSESSMENT & PLAN NOTE
- SIRS: 0/4; hypotension  - Hx of ESBL UTI; completed course of meropenem  - UA with 1+ leuks, 28 RBCs, >100 WBCs  - UCx w/GNR >100,000- susceptibilities to connie and bactrim- continue antibiotics leading up to ureteroscopy and dc post-op- stop connie - transition to Bactrim DS qday until sat- 02/19  - Given Vanc in ED   - suprapubic catheter changed in the ED, and most recently in OR 2/18

## 2022-02-20 LAB
ALBUMIN SERPL BCP-MCNC: 1.9 G/DL (ref 3.5–5.2)
ALP SERPL-CCNC: 107 U/L (ref 55–135)
ALT SERPL W/O P-5'-P-CCNC: 7 U/L (ref 10–44)
ANION GAP SERPL CALC-SCNC: 9 MMOL/L (ref 8–16)
ANION GAP SERPL CALC-SCNC: 9 MMOL/L (ref 8–16)
AST SERPL-CCNC: 9 U/L (ref 10–40)
BILIRUB SERPL-MCNC: 0.2 MG/DL (ref 0.1–1)
BUN SERPL-MCNC: 30 MG/DL (ref 8–23)
BUN SERPL-MCNC: 35 MG/DL (ref 8–23)
CALCIUM SERPL-MCNC: 8.3 MG/DL (ref 8.7–10.5)
CALCIUM SERPL-MCNC: 8.8 MG/DL (ref 8.7–10.5)
CHLORIDE SERPL-SCNC: 101 MMOL/L (ref 95–110)
CHLORIDE SERPL-SCNC: 104 MMOL/L (ref 95–110)
CO2 SERPL-SCNC: 19 MMOL/L (ref 23–29)
CO2 SERPL-SCNC: 21 MMOL/L (ref 23–29)
CREAT SERPL-MCNC: 2 MG/DL (ref 0.5–1.4)
CREAT SERPL-MCNC: 2.4 MG/DL (ref 0.5–1.4)
ERYTHROCYTE [DISTWIDTH] IN BLOOD BY AUTOMATED COUNT: 14.6 % (ref 11.5–14.5)
EST. GFR  (AFRICAN AMERICAN): 23 ML/MIN/1.73 M^2
EST. GFR  (AFRICAN AMERICAN): 28.7 ML/MIN/1.73 M^2
EST. GFR  (NON AFRICAN AMERICAN): 20 ML/MIN/1.73 M^2
EST. GFR  (NON AFRICAN AMERICAN): 24.9 ML/MIN/1.73 M^2
GLUCOSE SERPL-MCNC: 144 MG/DL (ref 70–110)
GLUCOSE SERPL-MCNC: 75 MG/DL (ref 70–110)
HCT VFR BLD AUTO: 25.9 % (ref 37–48.5)
HGB BLD-MCNC: 8 G/DL (ref 12–16)
MCH RBC QN AUTO: 29.3 PG (ref 27–31)
MCHC RBC AUTO-ENTMCNC: 30.9 G/DL (ref 32–36)
MCV RBC AUTO: 95 FL (ref 82–98)
PLATELET # BLD AUTO: 271 K/UL (ref 150–450)
PMV BLD AUTO: 9.7 FL (ref 9.2–12.9)
POCT GLUCOSE: 154 MG/DL (ref 70–110)
POCT GLUCOSE: 158 MG/DL (ref 70–110)
POCT GLUCOSE: 176 MG/DL (ref 70–110)
POCT GLUCOSE: 90 MG/DL (ref 70–110)
POCT GLUCOSE: 99 MG/DL (ref 70–110)
POTASSIUM SERPL-SCNC: 4.7 MMOL/L (ref 3.5–5.1)
POTASSIUM SERPL-SCNC: 4.7 MMOL/L (ref 3.5–5.1)
PROT SERPL-MCNC: 5.1 G/DL (ref 6–8.4)
RBC # BLD AUTO: 2.73 M/UL (ref 4–5.4)
SODIUM SERPL-SCNC: 131 MMOL/L (ref 136–145)
SODIUM SERPL-SCNC: 132 MMOL/L (ref 136–145)
WBC # BLD AUTO: 6.78 K/UL (ref 3.9–12.7)

## 2022-02-20 PROCEDURE — 25000003 PHARM REV CODE 250: Mod: HCNC | Performed by: STUDENT IN AN ORGANIZED HEALTH CARE EDUCATION/TRAINING PROGRAM

## 2022-02-20 PROCEDURE — 80048 BASIC METABOLIC PNL TOTAL CA: CPT | Mod: HCNC | Performed by: INTERNAL MEDICINE

## 2022-02-20 PROCEDURE — 25000003 PHARM REV CODE 250: Mod: HCNC | Performed by: INTERNAL MEDICINE

## 2022-02-20 PROCEDURE — 99233 SBSQ HOSP IP/OBS HIGH 50: CPT | Mod: HCNC,95,, | Performed by: INTERNAL MEDICINE

## 2022-02-20 PROCEDURE — 85027 COMPLETE CBC AUTOMATED: CPT | Mod: HCNC | Performed by: INTERNAL MEDICINE

## 2022-02-20 PROCEDURE — 99233 PR SUBSEQUENT HOSPITAL CARE,LEVL III: ICD-10-PCS | Mod: HCNC,95,, | Performed by: INTERNAL MEDICINE

## 2022-02-20 PROCEDURE — 36415 COLL VENOUS BLD VENIPUNCTURE: CPT | Mod: HCNC | Performed by: STUDENT IN AN ORGANIZED HEALTH CARE EDUCATION/TRAINING PROGRAM

## 2022-02-20 PROCEDURE — 36415 COLL VENOUS BLD VENIPUNCTURE: CPT | Mod: HCNC | Performed by: INTERNAL MEDICINE

## 2022-02-20 PROCEDURE — 25000003 PHARM REV CODE 250: Mod: HCNC | Performed by: PHYSICIAN ASSISTANT

## 2022-02-20 PROCEDURE — 80053 COMPREHEN METABOLIC PANEL: CPT | Mod: HCNC | Performed by: STUDENT IN AN ORGANIZED HEALTH CARE EDUCATION/TRAINING PROGRAM

## 2022-02-20 PROCEDURE — 11000001 HC ACUTE MED/SURG PRIVATE ROOM: Mod: HCNC

## 2022-02-20 PROCEDURE — 94761 N-INVAS EAR/PLS OXIMETRY MLT: CPT | Mod: HCNC

## 2022-02-20 RX ADMIN — INSULIN ASPART 4 UNITS: 100 INJECTION, SOLUTION INTRAVENOUS; SUBCUTANEOUS at 08:02

## 2022-02-20 RX ADMIN — SEVELAMER CARBONATE 800 MG: 800 TABLET, FILM COATED ORAL at 12:02

## 2022-02-20 RX ADMIN — METHOCARBAMOL 750 MG: 750 TABLET ORAL at 08:02

## 2022-02-20 RX ADMIN — METOPROLOL TARTRATE 25 MG: 25 TABLET, FILM COATED ORAL at 08:02

## 2022-02-20 RX ADMIN — SEVELAMER CARBONATE 800 MG: 800 TABLET, FILM COATED ORAL at 05:02

## 2022-02-20 RX ADMIN — HYDROCODONE BITARTRATE AND ACETAMINOPHEN 1 TABLET: 10; 325 TABLET ORAL at 08:02

## 2022-02-20 RX ADMIN — Medication 400 MG: at 08:02

## 2022-02-20 RX ADMIN — ATORVASTATIN CALCIUM 80 MG: 40 TABLET, FILM COATED ORAL at 08:02

## 2022-02-20 RX ADMIN — CLONIDINE HYDROCHLORIDE 0.2 MG: 0.2 TABLET ORAL at 03:02

## 2022-02-20 RX ADMIN — SODIUM ZIRCONIUM CYCLOSILICATE 10 G: 10 POWDER, FOR SUSPENSION ORAL at 08:02

## 2022-02-20 RX ADMIN — SUMATRIPTAN SUCCINATE 100 MG: 50 TABLET ORAL at 01:02

## 2022-02-20 RX ADMIN — INSULIN ASPART 4 UNITS: 100 INJECTION, SOLUTION INTRAVENOUS; SUBCUTANEOUS at 05:02

## 2022-02-20 RX ADMIN — APIXABAN 2.5 MG: 2.5 TABLET, FILM COATED ORAL at 08:02

## 2022-02-20 RX ADMIN — LEVOTHYROXINE SODIUM 50 MCG: 50 TABLET ORAL at 05:02

## 2022-02-20 RX ADMIN — SEVELAMER CARBONATE 800 MG: 800 TABLET, FILM COATED ORAL at 08:02

## 2022-02-20 RX ADMIN — INSULIN DETEMIR 13 UNITS: 100 INJECTION, SOLUTION SUBCUTANEOUS at 08:02

## 2022-02-20 RX ADMIN — TRAZODONE HYDROCHLORIDE 100 MG: 100 TABLET ORAL at 08:02

## 2022-02-20 RX ADMIN — FAMOTIDINE 20 MG: 20 TABLET ORAL at 08:02

## 2022-02-20 RX ADMIN — INSULIN ASPART 4 UNITS: 100 INJECTION, SOLUTION INTRAVENOUS; SUBCUTANEOUS at 12:02

## 2022-02-20 RX ADMIN — OXYBUTYNIN CHLORIDE 10 MG: 10 TABLET, EXTENDED RELEASE ORAL at 08:02

## 2022-02-20 RX ADMIN — CLONIDINE HYDROCHLORIDE 0.2 MG: 0.2 TABLET ORAL at 08:02

## 2022-02-20 RX ADMIN — ANASTROZOLE 1 MG: 1 TABLET, COATED ORAL at 08:02

## 2022-02-20 RX ADMIN — VENLAFAXINE HYDROCHLORIDE 150 MG: 150 CAPSULE, EXTENDED RELEASE ORAL at 08:02

## 2022-02-20 RX ADMIN — LISINOPRIL 40 MG: 20 TABLET ORAL at 08:02

## 2022-02-20 RX ADMIN — HYDROCODONE BITARTRATE AND ACETAMINOPHEN 1 TABLET: 10; 325 TABLET ORAL at 09:02

## 2022-02-20 NOTE — ASSESSMENT & PLAN NOTE
- HD MWF  - Nephrology following  - continue sevelamer carbonate 800mg TIDW  - improving  - IV hydration following Cystoscope 2/18.  - sCr and potassium elevated 2/19; trial of Lokelma and 1 L NS. Monitor closely

## 2022-02-20 NOTE — SUBJECTIVE & OBJECTIVE
Telemedicine  This service was provided by Virtual Visit.    Patient was transferred to [facility] Medicine on:  2/17/22     Chief Complaint   Patient presents with    Hypotension     Arrived via [facility] from dialysis center with c/o hypotension SBP 80s, sent for possible urosepsis, recent UTI diagnosis, did not receive dialysis today, last dialyzed wednesday       The patient location is: [facility]   Admitted 2/11/2022  8:18 AM  Present with the patient at the time of the telemed/virtual assessment: n/a    Interval History / Events Overnight:   The patient is able to provide adequate history. Additional history was obtained from past medical records. No significant events reported by Nursing.   Patient complains of nothing specific. Symptoms have improved since yesterday. Associated symptoms include: fatigue. Symptoms are decreasing in severity.     Lab test(s) reviewed: sCr increased    Review of Systems   Constitutional:  Negative for fever.   Respiratory:  Negative for shortness of breath.      Objective:     Vital Signs (Most Recent):  Temp: 98.2 °F (36.8 °C) (02/20/22 0824)  Pulse: 78 (02/20/22 0843)  Resp: 18 (02/20/22 0924)  BP: (!) 146/65 (02/20/22 0843)  SpO2: (!) 94 % (02/20/22 0843) Vital Signs (24h Range):  Temp:  [96.9 °F (36.1 °C)-98.7 °F (37.1 °C)] 98.2 °F (36.8 °C)  Pulse:  [60-84] 78  Resp:  [18-20] 18  SpO2:  [92 %-99 %] 94 %  BP: (131-172)/(60-98) 146/65     Weight: 104.8 kg (231 lb)  Body mass index is 40.92 kg/m².    Intake/Output Summary (Last 24 hours) at 2/20/2022 1044  Last data filed at 2/20/2022 0507  Gross per 24 hour   Intake --   Output 2150 ml   Net -2150 ml        Physical Exam  Constitutional:       General: She is not in acute distress.     Appearance: Normal appearance. She is not diaphoretic.   Eyes:      General: Lids are normal. No scleral icterus.        Right eye: No discharge.         Left eye: No discharge.      Conjunctiva/sclera: Conjunctivae normal.    Cardiovascular:      Rate and Rhythm: Normal rate.   Pulmonary:      Effort: Pulmonary effort is normal. No tachypnea, accessory muscle usage or respiratory distress.   Abdominal:      General: There is no distension.   Skin:     Coloration: Skin is not cyanotic.   Neurological:      Mental Status: She is alert. She is not disoriented.   Psychiatric:         Attention and Perception: Attention normal.         Mood and Affect: Affect normal.         Behavior: Behavior is cooperative.       Significant Labs:     Recent Labs   Lab 09/05/21  2139 12/21/21  0803 01/03/22  0000   HGBA1C 6.6* 5.3 5.4     :   Recent Labs   Lab 02/19/22  1113 02/19/22  1547 02/20/22  0819   POCTGLUCOSE 123* 111* 158*       Recent Labs   Lab 02/14/22  0355 02/20/22  0222   WBC 5.76 6.78   HGB 9.2* 8.0*   HCT 30.4* 25.9*    271       Recent Labs   Lab 02/14/22  0355   GRAN 43.5  2.5   LYMPH 32.5  1.9   MONO 14.1  0.8   EOS 0.5       Recent Labs   Lab 02/15/22  0818 02/16/22  0358 02/17/22  0333 02/18/22  0231 02/18/22  1812 02/19/22  0316 02/20/22  0222   *   < > 132* 130* 131* 127* 132*   K 4.5   < > 4.6 5.3* 5.3* 5.5* 4.7      < > 100 102 104 99 104   CO2 25   < > 21* 20* 19* 21* 19*   BUN 27*   < > 34* 35* 31* 35* 35*   CREATININE 2.0*   < > 2.1* 2.1* 2.1* 2.2* 2.4*   *   < > 143* 131* 197* 139* 144*   CALCIUM 9.5   < > 9.1 8.7 8.5* 8.5* 8.3*   ALBUMIN 2.0*  --  2.0* 1.8*  --  1.9* 1.9*   MG  --   --  2.0  --   --   --   --    PHOS 4.4  --  4.5  --   --   --   --     < > = values in this interval not displayed.       Recent Labs   Lab 11/28/21  1952/21  0413 12/06/21  0758 01/03/22  0000 01/23/22  0332 02/09/22  0000 02/11/22  0849 02/11/22  1304   PROCAL  --   --  0.69*  --   --   --  0.12  --    LACTATE 0.8  --   --   --   --   --  1.8 1.4   FERRITIN  --    < >  --  353* 812* 470*  --   --     < > = values in this interval not displayed.       Results for orders placed or performed in visit on 02/09/22    Vitamin D   Result Value Ref Range    Vit D, 25-Hydroxy 58.8 30.0 - 100.0 ng/mL     SARS-CoV2 (COVID-19) Qualitative PCR (no units)   Date Value   02/15/2022 Not Detected   12/28/2021 Not Detected   12/21/2021 Not Detected   09/05/2021 Not Detected     POC Rapid COVID (no units)   Date Value   02/11/2022 Negative   01/21/2022 Negative   01/20/2022 Negative   11/28/2021 Negative   09/28/2021 Negative   09/05/2021 Negative   05/14/2021 Negative       ECG Results              EKG 12-lead (Final result)  Result time 02/12/22 09:41:30      Final result by Interface, Lab In [facility] (02/12/22 09:41:30)                   Narrative:    Test Reason :     Vent. Rate : 057 BPM     Atrial Rate : 059 BPM     P-R Int : 184 ms          QRS Dur : 080 ms      QT Int : 426 ms       P-R-T Axes : -40 -13 -15 degrees     QTc Int : 415 ms    Sinus bradycardia  Voltage criteria for left ventricular hypertrophy  Lateral infarct (cited on or before 11-FEB-2022)  Abnormal ECG  When compared with ECG of 11-FEB-2022 08:39,  No significant change was found  Confirmed by KEVYN GARSIA MD ([id number]) on 2/12/2022 9:41:21 AM    Referred By: [email or web address]   SELF           Confirmed By:KEVYN GARSIA MD                                     EKG 12-lead (Final result)  Result time 02/12/22 09:39:20      Final result by Interface, Lab In [facility] (02/12/22 09:39:20)                   Narrative:    Test Reason : I95.9,    Vent. Rate : 066 BPM     Atrial Rate : 068 BPM     P-R Int : 000 ms          QRS Dur : 072 ms      QT Int : 402 ms       P-R-T Axes : 000 -15 -08 degrees     QTc Int : 422 ms    Technically poor ECG tracing  Sinus rhythm  Voltage criteria for left ventricular hypertrophy  Lateral infarct ,age undetermined  Abnormal ECG  When compared with ECG of 21-JAN-2022 12:55,  Questionable change in in R wave progression  Lateral infarct is now Present  T wave inversion now evident in Inferior leads  Confirmed by KEVYN GARSIA MD ([id number]) on 2/12/2022  9:39:06 AM    Referred By: [email or web address]   SELF           Confirmed By:KEVYN GARSIA MD                                    Results for orders placed during the hospital encounter of 11/28/21    Echo    Interpretation Summary  · The left ventricle is normal in size with normal systolic function. The estimated ejection fraction is 60%.  · Normal right ventricular size with normal right ventricular systolic function.  · Normal left ventricular diastolic function.  · Mechanically ventilated; cannot use inferior caval vein diameter to estimate central venous pressure.      SURG [place] Surgery Fluoro Usage  See OP Notes for results.     IMPRESSION: See OP Notes for results.     This procedure was auto-finalized by: Virtual Radiologist      Labs and Imaging within the last 24 hours listed above were reviewed.       Diet: Diet diabetic [facility]; 1500 Calorie; Renal; Fluid - 800mL  Significant LDAs:   IV Access Type: Dialysis Access  Urinary Catheter Indication if present: Patient Does Not Have Urinary Catheter  Other Lines/Tubes/Drains: SPC    HIGH RISK CONDITION(S):   Patient has an abrupt change in neurologic status: Weakness     Goals of Care:    Previous admission:  1/21/22  Likely prognosis:  Fair  Code Status: Full Code  Comfort Only: No  Hospice: No  Goals at discharge: remain at home, with physician follow-up    Discharge Planning   FLORENTINO: 2/22/2022     Code Status: Full Code   Is the patient medically ready for discharge?: No    Reason for patient still in hospital (select all that apply): Patient trending condition and Pending disposition  Discharge Plan A: Home Health

## 2022-02-20 NOTE — ASSESSMENT & PLAN NOTE
- h/o left ureteral stone and indwelling left ureteral stent  - stent placed 06/30/21- pt lost to fu- stent exchanged 12/23/21  - Urology following: plan for ureteroscopy w/laser lithotripsy vs. Stone basket extraction vs. Stent replacement 02/18/22  - Per Urology on 2/18:  Left ureteroscopy, Cystoscopy,  Left ureteral biopsy,  Left JJ ureteral stent exchange,  Left retrograde pyelogram. Patient or Nurse to remove stent on strings on Monday morning.

## 2022-02-20 NOTE — PLAN OF CARE
Problem: Fluid and Electrolyte Imbalance (Acute Kidney Injury/Impairment)  Goal: Fluid and Electrolyte Balance  Outcome: Ongoing, Progressing     Problem: Renal Function Impairment (Acute Kidney Injury/Impairment)  Goal: Effective Renal Function  Outcome: Ongoing, Progressing     Problem: Impaired Wound Healing  Goal: Optimal Wound Healing  Outcome: Ongoing, Progressing     Problem: Electrolyte Imbalance (Chronic Kidney Disease)  Goal: Electrolyte Balance  Outcome: Ongoing, Progressing  Patient Aox4, remains on RA. Norco & robaxin given once at bedtime for c/o back pain. Transparent film dressing in place over ureteral strings. SP catheter in place, gauze dressing in place; 1200 mL edilson-colored urine overnight. HS glucose WNL, no PRN insulin required. Wound care performed to sacrum this morning. All needs met overnight.

## 2022-02-20 NOTE — PLAN OF CARE
Problem: Adult Inpatient Plan of Care  Goal: Plan of Care Review  Outcome: Ongoing, Progressing     Problem: Adult Inpatient Plan of Care  Goal: Absence of Hospital-Acquired Illness or Injury  Outcome: Ongoing, Progressing     Problem: Diabetes Comorbidity  Goal: Blood Glucose Level Within Targeted Range  Outcome: Ongoing, Progressing     Pt.AAOx4, VSS, no falls or injuries during shift, safety maintained. Call light in reach, bed locked and in lowest position, side rails up x2. Blood glucose monitored and controlled, no acute events.  Wound care completed to buttocks, suprapubic catheter in place with edilson colo urine in drainage bag. catheter care complete with CHG wipes.Will continue to monitor.

## 2022-02-20 NOTE — PROGRESS NOTES
[facility] - Telemetry [facility] ([address] Medicine  Telemedicine Progress Note    Patient Name: Cecile Bowen  MRN: [id number]  Patient Class: IP- Inpatient   Admission Date: 2/11/2022  Length of Stay: 5 days  Attending Physician: Liana Ruiz MD  Primary Care Provider: Kailey Navarro MD      Subjective:     Principal Problem:Acute cystitis with hematuria    HPI:  Cecile Bowen is a 71 y.o. female with PMHx significant for HTN, HLD, hx of ESBL UTI, ESRD on HD admitted to observation for hypotension, found to have a UTI. Patient was at her dialysis clinic today when she was found to have a BP of 76/43 and advised to come to the ED for evaluation. Reports her SBPs usually run in the 120s. Endorses cloudy urine in her catheter bag for the past few days, and reports it was last changed about a week ago. Denies lightheadedness, dizziness, SOB, fatigue, weakness, HA, CP, cough, abdominal pain, n/v. Patient was recently hospitalized for a ESBL UTI for which she completed her course of meropenem. Of note, patient also has a right subclavian dialysis access that was also changed about a week ago.     In the ED, BP 94/24 improved to 102/57. Remaining VSS. WBC 8.06. Lactic 1.8. Procal 0.12. Hgb 8.5 (~BL). . Cr 2.3 (~BL). Glucose 224. UA with 1+ leuks, 28 RBCs, >100 WBCs. CXR with no acute processes. Given Vanc 2g x 1.       Overview/Hospital Course:  Admitted to  for possible UTI. Cultures obtained, suprapubic catheter changes apparently in the ED, and started Abx. Nephrology consulted for HD. Pt continued on broad antibiotics w/improvement in signs and symptoms. Final culture results w/klebsiella pneumonia- susceptible to connie and bactrim. Urology evaluated patient, 02/14. Pt w/h/o Lt ureteral stone- s/p stent placement 06/2021 and replacement 12/2021- intermittently lost to follow-up. Planning for ureteroscopy 02/18 w/definitive stone management and stent removal as patient has high  likelihood to continue to be lost to f/u. Agreed that it was in the best interest of the patient to remain inpatient to undergo further interventions/evaluations via urology. Pt was transitioned to bactrim 02/16- connie stopped.      Telemedicine  This service was provided by Virtual Visit.    Patient was transferred to [facility] on:  2/17/22     Chief Complaint   Patient presents with    Hypotension     [facility] with c/o hypotension SBP 80s, sent for possible urosepsis, recent UTI diagnosis, did not receive dialysis today, last dialyzed wednesday       [facility]   Admitted 2/11/2022  8:18 AM  Present with the patient at the time of the telemed/virtual assessment: n/a    Interval History / Events Overnight:   The patient is able to provide adequate history. Additional history was obtained from past medical records. No significant events reported by Nursing.   Patient complains of nothing specific. Symptoms have improved since yesterday. Associated symptoms include: fatigue. Symptoms are decreasing in severity.     Lab test(s) reviewed: hyperkalemia and sCr increased    Review of Systems   Constitutional:  Negative for fever.   Respiratory:  Negative for shortness of breath.      Objective:     Vital Signs (Most Recent):  Temp: 98.7 °F (37.1 °C) (02/19/22 1111)  Pulse: 64 (02/19/22 1111)  Resp: 18 (02/19/22 1111)  BP: 131/60 (02/19/22 1111)  SpO2: 99 % (02/19/22 1111) Vital Signs (24h Range):  Temp:  [98.3 °F (36.8 °C)-98.8 °F (37.1 °C)] 98.7 °F (37.1 °C)  Pulse:  [54-70] 64  Resp:  [16-20] 18  SpO2:  [92 %-100 %] 99 %  BP: (127-182)/(60-78) 131/60     Weight: 104.8 kg (231 lb)  Body mass index is 40.92 kg/m².    Intake/Output Summary (Last 24 hours) at 2/19/2022 1407  Last data filed at 2/19/2022 0512  Gross per 24 hour   Intake --   Output 2200 ml   Net -2200 ml        Physical Exam  Constitutional:       General: She is not in acute  distress.     Appearance: Normal appearance. She is not diaphoretic.   Eyes:      General: Lids are normal. No scleral icterus.        Right eye: No discharge.         Left eye: No discharge.      Conjunctiva/sclera: Conjunctivae normal.   Cardiovascular:      Rate and Rhythm: Normal rate.   Pulmonary:      Effort: Pulmonary effort is normal. No tachypnea, accessory muscle usage or respiratory distress.   Abdominal:      General: There is no distension.   Skin:     Coloration: Skin is not cyanotic.   Neurological:      Mental Status: She is alert. She is not disoriented.   Psychiatric:         Attention and Perception: Attention normal.         Mood and Affect: Affect normal.         Behavior: Behavior is cooperative.       Significant Labs:     Recent Labs   Lab 09/05/21  2139 12/21/21  0803 01/03/22  0000   HGBA1C 6.6* 5.3 5.4     :   Recent Labs   Lab 02/18/22  2025 02/19/22  0726 02/19/22  1113   POCTGLUCOSE 181* 152* 123*       Recent Labs   Lab 02/13/22  0514 02/14/22  0355   WBC 5.58 5.76   HGB 9.2* 9.2*   HCT 29.7* 30.4*    332       Recent Labs   Lab 02/13/22  0514 02/14/22  0355   GRAN 43.2  2.4 43.5  2.5   LYMPH 33.2  1.9 32.5  1.9   MONO 14.9  0.8 14.1  0.8   EOS 0.4 0.5       Recent Labs   Lab 02/15/22  0818 02/16/22  0358 02/17/22  0333 02/18/22  0231 02/18/22  1812 02/19/22  0316   *   < > 132* 130* 131* 127*   K 4.5   < > 4.6 5.3* 5.3* 5.5*      < > 100 102 104 99   CO2 25   < > 21* 20* 19* 21*   BUN 27*   < > 34* 35* 31* 35*   CREATININE 2.0*   < > 2.1* 2.1* 2.1* 2.2*   *   < > 143* 131* 197* 139*   CALCIUM 9.5   < > 9.1 8.7 8.5* 8.5*   ALBUMIN 2.0*  --  2.0* 1.8*  --  1.9*   MG  --   --  2.0  --   --   --    PHOS 4.4  --  4.5  --   --   --     < > = values in this interval not displayed.       Recent Labs   Lab 11/28/21  1952/21  0413 12/06/21  0758 01/03/22  0000 01/23/22  0332 02/09/22  0000 02/11/22  0849 02/11/22  1304   PROCAL  --   --  0.69*  --   --   --   0.12  --    LACTATE 0.8  --   --   --   --   --  1.8 1.4   FERRITIN  --    < >  --  353* 812* 470*  --   --     < > = values in this interval not displayed.       Results for orders placed or performed in visit on 02/09/22   Vitamin D   Result Value Ref Range    Vit D, 25-Hydroxy 58.8 30.0 - 100.0 ng/mL     SARS-CoV2 (COVID-19) Qualitative PCR (no units)   Date Value   02/15/2022 Not Detected   12/28/2021 Not Detected   12/21/2021 Not Detected   09/05/2021 Not Detected     POC Rapid COVID (no units)   Date Value   02/11/2022 Negative   01/21/2022 Negative   01/20/2022 Negative   11/28/2021 Negative   09/28/2021 Negative   09/05/2021 Negative   05/14/2021 Negative       ECG Results              EKG 12-lead (Final result)  Result time 02/12/22 09:41:30      Final result by Interface, Lab In [facility] (02/12/22 09:41:30)                   Narrative:    Test Reason :     Vent. Rate : 057 BPM     Atrial Rate : 059 BPM     P-R Int : 184 ms          QRS Dur : 080 ms      QT Int : 426 ms       P-R-T Axes : -40 -13 -15 degrees     QTc Int : 415 ms    Sinus bradycardia  Voltage criteria for left ventricular hypertrophy  Lateral infarct (cited on or before 11-FEB-2022)  Abnormal ECG  When compared with ECG of 11-FEB-2022 08:39,  No significant change was found  Confirmed by SUN DANIEL HOMEYAR ([id number]) on 2/12/2022 9:41:21 AM    Referred By: [email or web address]   SELF           Confirmed By:KEVYN GARSIA MD                                     EKG 12-lead (Final result)  Result time 02/12/22 09:39:20      Final result by Interface, Lab In [facility] (02/12/22 09:39:20)                   Narrative:    Test Reason : I95.9,    Vent. Rate : 066 BPM     Atrial Rate : 068 BPM     P-R Int : 000 ms          QRS Dur : 072 ms      QT Int : 402 ms       P-R-T Axes : 000 -15 -08 degrees     QTc Int : 422 ms    Technically poor ECG tracing  Sinus rhythm  Voltage criteria for left ventricular hypertrophy  Lateral infarct ,age undetermined  Abnormal  ECG  When compared with ECG of 21-JAN-2022 12:55,  Questionable change in in R wave progression  Lateral infarct is now Present  T wave inversion now evident in Inferior leads  Confirmed by KEVYN GARSIA MD ([id number]) on 2/12/2022 9:39:06 AM    Referred By: ELVA   SELF           Confirmed By:KEVYN GARSAI MD                                    Results for orders placed during the hospital encounter of 11/28/21    Echo    Interpretation Summary  · The left ventricle is normal in size with normal systolic function. The estimated ejection fraction is 60%.  · Normal right ventricular size with normal right ventricular systolic function.  · Normal left ventricular diastolic function.  · Mechanically ventilated; cannot use inferior caval vein diameter to estimate central venous pressure.      SURG FL Surgery Fluoro Usage  See OP Notes for results.     IMPRESSION: See OP Notes for results.     This procedure was auto-finalized by: Virtual Radiologist      Labs and Imaging within the last 24 hours listed above were reviewed.       Diet: Diet diabetic [facility]; 1500 Calorie; Renal; Fluid - 800mL  Significant LDAs:   IV Access Type: Dialysis Access  Urinary Catheter Indication if present: Patient Does Not Have Urinary Catheter  Other Lines/Tubes/Drains: SPC    HIGH RISK CONDITION(S):   Patient has an abrupt change in neurologic status: Weakness     Goals of Care:    Previous admission:  1/21/22  Likely prognosis:  Fair  Code Status: Full Code  Comfort Only: No  Hospice: No  Goals at discharge: remain at home, with physician follow-up    Discharge Planning   FLORENTINO: 2/22/2022     Code Status: Full Code   Is the patient medically ready for discharge?: No    Reason for patient still in hospital (select all that apply): Patient trending condition and Pending disposition  Discharge Plan A: Home Health          Assessment/Plan:      * Acute cystitis with hematuria  - SIRS: 0/4; hypotension  - Hx of ESBL UTI; completed  course of meropenem  - UA with 1+ leuks, 28 RBCs, >100 WBCs  - UCx w/GNR >100,000- susceptibilities to connie and bactrim- continue antibiotics leading up to ureteroscopy and dc post-op- stop connie - transition to Bactrim DS qday until sat- 02/19  - Given Vanc in ED   - suprapubic catheter changed in the ED, and most recently in OR 2/18    CKD (chronic kidney disease) requiring chronic dialysis  - HD MWF  - Nephrology following  - continue sevelamer carbonate 800mg TIDW  - improving  - IV hydration following Cystoscope 2/18.  - sCr and potassium elevated 2/19; trial of Lokelma and 1 L NS. Monitor closely    Pressure injury of right buttock, stage 3  - present on admission  - wound care consulted, appreciate recs  - turn patient q2h    Urinary tract infection due to ESBL Klebsiella  - hx of    Secondary hypertension  - Continue lopressor, lisinopril and clonidine  - Holding Lasix   - Titrate BP meds accordingly    A-fib  - continue Eliquis 2.5mg BID  - continue Lopressor 25mg BID     Left ureteral stone  - h/o left ureteral stone and indwelling left ureteral stent  - stent placed 06/30/21- pt lost to fu- stent exchanged 12/23/21  - Urology following: plan for ureteroscopy w/laser lithotripsy vs. Stone basket extraction vs. Stent replacement 02/18/22  - Per Urology on 2/18:  Left ureteroscopy, Cystoscopy,  Left ureteral biopsy,  Left JJ ureteral stent exchange,  Left retrograde pyelogram. Patient or Nurse to remove stent on strings on Monday morning.    Malignant neoplasm of left breast, estrogen receptor positive  - continue home anastrozole 1mg daily    Chronic pain  - follows w/pain specialist  - Improved back pain   - continue Norco 10mg q6h for moderate pain    Suprapubic catheter  - exchanged Q month    Uncontrolled type 2 diabetes mellitus with stage 4 chronic kidney disease, with long-term current use of insulin  - uncontrolled on admit  - continued Levemir 13 units daily, Novolog 4 units TIDW, SSI  - up titrate  as needed    Mixed migraine and muscle contraction headache  - stable  - home Fioricet and sumatriptan prn    Neurogenic bladder  - noted, has suprapubic catheter   - SPC changed in OR 2/18    Urinary retention  - continue home oxybutynin 10mg daily    Recurrent urinary tract infection  - see above    Hyperlipidemia associated with type 2 diabetes mellitus  - continue Lipitor 80mg daily    Hypothyroidism  - continue home Synthroid 50mcg        Active Hospital Problems    Diagnosis  POA    *Acute cystitis with hematuria [N30.01]  Yes     L ureteroscopy, Cystoscopy, Left ureteral biopsy, Left JJ ureteral stent exchange, Left retrograde pyelogram, Suprapubic catheter exchanged  Antibiotics per ID    -Plan per primary team         CKD (chronic kidney disease) requiring chronic dialysis [N18.6, Z99.2]  Not Applicable    Pressure injury of right buttock, stage 3 [L89.313]  Yes    Urinary tract infection due to ESBL Klebsiella [N39.0, B96.89]  Yes    Secondary hypertension [I15.9]  Yes    A-fib [I48.91]  Yes     Chronic    Left ureteral stone [N20.1]  Yes    Malignant neoplasm of left breast, estrogen receptor positive [C50.912, Z17.0]  Not Applicable     Chronic    Chronic pain [G89.29]  Yes     Chronic    Suprapubic catheter [Z93.59]  Not Applicable     Chronic    Uncontrolled type 2 diabetes mellitus with stage 4 chronic kidney disease, with long-term current use of insulin [E11.22, E11.65, N18.4, Z79.4]  Not Applicable    Mixed migraine and muscle contraction headache [G43.909, G44.209]  Yes    Neurogenic bladder [N31.9]  Yes     Chronic    Urinary retention [R33.9]  Yes    Recurrent urinary tract infection [N39.0]  Yes    Hyperlipidemia associated with type 2 diabetes mellitus [E11.69, E78.5]  Yes     Chronic    Hypothyroidism [E03.9]  Yes     Chronic      Resolved Hospital Problems   No resolved problems to display.       Inpatient Medications Prescribed for Management of Current Problems:      Scheduled Meds:    anastrozole  1 mg Oral Daily    apixaban  2.5 mg Oral BID    atorvastatin  80 mg Oral Daily    cloNIDine  0.2 mg Oral TID    famotidine  20 mg Oral Daily    insulin aspart U-100  4 Units Subcutaneous TIDWM    insulin detemir U-100  13 Units Subcutaneous Daily    levothyroxine  50 mcg Oral Before breakfast    lisinopriL  40 mg Oral Daily    magnesium oxide  400 mg Oral Daily    metoprolol tartrate  25 mg Oral BID    oxybutynin  10 mg Oral Daily    sevelamer carbonate  800 mg Oral TID WM    sodium zirconium cyclosilicate  10 g Oral TID    venlafaxine  150 mg Oral Daily     Continuous Infusions:    sodium chloride 0.9% 50 mL/hr at 02/19/22 0955     As Needed: acetaminophen, bisacodyL, cloNIDine, dextrose 10%, dextrose 10%, glucagon (human recombinant), glucose, glucose, heparin (porcine), HYDROcodone-acetaminophen, influenza, insulin aspart U-100, magnesium oxide, magnesium oxide, melatonin, methocarbamoL, naloxone, ondansetron, polyethylene glycol, promethazine, sodium chloride 0.9%, sodium chloride 0.9%, sumatriptan, traZODone    VTE Risk Mitigation (From admission, onward)         Ordered     heparin (porcine) injection 1,000 Units  As needed (PRN)         02/12/22 1104     apixaban tablet 2.5 mg  2 times daily         02/11/22 1342     IP VTE HIGH RISK PATIENT  Once         02/11/22 1132     Place sequential compression device  Until discontinued         02/11/22 1132              I have assessed these finding virtually using telemed platform and with assistance of bedside nurse     The attending portion of this evaluation, treatment, and documentation was performed per Liana Ruiz MD via Telemedicine AudioVisual using the secure [employer] software platform with 2 way audio/video. The provider was located off-site and the patient is located in the hospital. The aforementioned video software was utilized to document the relevant history and physical exam    Liana Ruiz  MD  Department of Hospital Medicine   [facility] - Telemetry Stepdown ([place])

## 2022-02-21 ENCOUNTER — TELEPHONE (OUTPATIENT)
Dept: NEPHROLOGY | Facility: CLINIC | Age: 72
End: 2022-02-21
Payer: MEDICARE

## 2022-02-21 LAB
ALBUMIN SERPL BCP-MCNC: 1.9 G/DL (ref 3.5–5.2)
ALBUMIN SERPL BCP-MCNC: 2.2 G/DL (ref 3.5–5.2)
ALP SERPL-CCNC: 105 U/L (ref 55–135)
ALT SERPL W/O P-5'-P-CCNC: 7 U/L (ref 10–44)
ANION GAP SERPL CALC-SCNC: 8 MMOL/L (ref 8–16)
ANION GAP SERPL CALC-SCNC: 8 MMOL/L (ref 8–16)
AST SERPL-CCNC: 10 U/L (ref 10–40)
BILIRUB SERPL-MCNC: 0.2 MG/DL (ref 0.1–1)
BUN SERPL-MCNC: 31 MG/DL (ref 8–23)
BUN SERPL-MCNC: 32 MG/DL (ref 8–23)
CALCIUM SERPL-MCNC: 8.5 MG/DL (ref 8.7–10.5)
CALCIUM SERPL-MCNC: 9 MG/DL (ref 8.7–10.5)
CHLORIDE SERPL-SCNC: 102 MMOL/L (ref 95–110)
CHLORIDE SERPL-SCNC: 103 MMOL/L (ref 95–110)
CO2 SERPL-SCNC: 21 MMOL/L (ref 23–29)
CO2 SERPL-SCNC: 23 MMOL/L (ref 23–29)
CREAT SERPL-MCNC: 2.1 MG/DL (ref 0.5–1.4)
CREAT SERPL-MCNC: 2.1 MG/DL (ref 0.5–1.4)
EST. GFR  (AFRICAN AMERICAN): 27.1 ML/MIN/1.73 M^2
EST. GFR  (AFRICAN AMERICAN): 27.1 ML/MIN/1.73 M^2
EST. GFR  (NON AFRICAN AMERICAN): 23.5 ML/MIN/1.73 M^2
EST. GFR  (NON AFRICAN AMERICAN): 23.5 ML/MIN/1.73 M^2
GLUCOSE SERPL-MCNC: 104 MG/DL (ref 70–110)
GLUCOSE SERPL-MCNC: 130 MG/DL (ref 70–110)
PHOSPHATE SERPL-MCNC: 4.3 MG/DL (ref 2.7–4.5)
POCT GLUCOSE: 113 MG/DL (ref 70–110)
POCT GLUCOSE: 116 MG/DL (ref 70–110)
POCT GLUCOSE: 182 MG/DL (ref 70–110)
POCT GLUCOSE: 206 MG/DL (ref 70–110)
POTASSIUM SERPL-SCNC: 4.1 MMOL/L (ref 3.5–5.1)
POTASSIUM SERPL-SCNC: 4.3 MMOL/L (ref 3.5–5.1)
PROT SERPL-MCNC: 5.2 G/DL (ref 6–8.4)
SODIUM SERPL-SCNC: 132 MMOL/L (ref 136–145)
SODIUM SERPL-SCNC: 133 MMOL/L (ref 136–145)

## 2022-02-21 PROCEDURE — 99233 SBSQ HOSP IP/OBS HIGH 50: CPT | Mod: HCNC,95,, | Performed by: INTERNAL MEDICINE

## 2022-02-21 PROCEDURE — 11000001 HC ACUTE MED/SURG PRIVATE ROOM: Mod: HCNC

## 2022-02-21 PROCEDURE — 36415 COLL VENOUS BLD VENIPUNCTURE: CPT | Mod: HCNC | Performed by: INTERNAL MEDICINE

## 2022-02-21 PROCEDURE — 80053 COMPREHEN METABOLIC PANEL: CPT | Mod: HCNC | Performed by: INTERNAL MEDICINE

## 2022-02-21 PROCEDURE — 25000003 PHARM REV CODE 250: Mod: HCNC | Performed by: STUDENT IN AN ORGANIZED HEALTH CARE EDUCATION/TRAINING PROGRAM

## 2022-02-21 PROCEDURE — 80069 RENAL FUNCTION PANEL: CPT | Mod: HCNC | Performed by: INTERNAL MEDICINE

## 2022-02-21 PROCEDURE — 25000003 PHARM REV CODE 250: Mod: HCNC | Performed by: PHYSICIAN ASSISTANT

## 2022-02-21 PROCEDURE — 25000003 PHARM REV CODE 250: Mod: HCNC | Performed by: INTERNAL MEDICINE

## 2022-02-21 PROCEDURE — 99233 PR SUBSEQUENT HOSPITAL CARE,LEVL III: ICD-10-PCS | Mod: HCNC,95,, | Performed by: INTERNAL MEDICINE

## 2022-02-21 RX ORDER — AMOXICILLIN 250 MG
1 CAPSULE ORAL 2 TIMES DAILY
Status: DISCONTINUED | OUTPATIENT
Start: 2022-02-21 | End: 2022-02-25 | Stop reason: HOSPADM

## 2022-02-21 RX ADMIN — SUMATRIPTAN SUCCINATE 100 MG: 50 TABLET ORAL at 02:02

## 2022-02-21 RX ADMIN — DOCUSATE SODIUM 50 MG AND SENNOSIDES 8.6 MG 1 TABLET: 8.6; 5 TABLET, FILM COATED ORAL at 01:02

## 2022-02-21 RX ADMIN — CLONIDINE HYDROCHLORIDE 0.2 MG: 0.2 TABLET ORAL at 08:02

## 2022-02-21 RX ADMIN — VENLAFAXINE HYDROCHLORIDE 150 MG: 150 CAPSULE, EXTENDED RELEASE ORAL at 08:02

## 2022-02-21 RX ADMIN — Medication 400 MG: at 08:02

## 2022-02-21 RX ADMIN — APIXABAN 2.5 MG: 2.5 TABLET, FILM COATED ORAL at 08:02

## 2022-02-21 RX ADMIN — INSULIN ASPART 4 UNITS: 100 INJECTION, SOLUTION INTRAVENOUS; SUBCUTANEOUS at 05:02

## 2022-02-21 RX ADMIN — DOCUSATE SODIUM 50 MG AND SENNOSIDES 8.6 MG 1 TABLET: 8.6; 5 TABLET, FILM COATED ORAL at 09:02

## 2022-02-21 RX ADMIN — HYDROCODONE BITARTRATE AND ACETAMINOPHEN 1 TABLET: 10; 325 TABLET ORAL at 09:02

## 2022-02-21 RX ADMIN — POLYETHYLENE GLYCOL 3350 17 G: 17 POWDER, FOR SOLUTION ORAL at 05:02

## 2022-02-21 RX ADMIN — LISINOPRIL 40 MG: 20 TABLET ORAL at 08:02

## 2022-02-21 RX ADMIN — INSULIN ASPART 4 UNITS: 100 INJECTION, SOLUTION INTRAVENOUS; SUBCUTANEOUS at 08:02

## 2022-02-21 RX ADMIN — ATORVASTATIN CALCIUM 80 MG: 40 TABLET, FILM COATED ORAL at 08:02

## 2022-02-21 RX ADMIN — METOPROLOL TARTRATE 25 MG: 25 TABLET, FILM COATED ORAL at 08:02

## 2022-02-21 RX ADMIN — FAMOTIDINE 20 MG: 20 TABLET ORAL at 08:02

## 2022-02-21 RX ADMIN — INSULIN DETEMIR 13 UNITS: 100 INJECTION, SOLUTION SUBCUTANEOUS at 08:02

## 2022-02-21 RX ADMIN — HYDROCODONE BITARTRATE AND ACETAMINOPHEN 1 TABLET: 10; 325 TABLET ORAL at 08:02

## 2022-02-21 RX ADMIN — SUMATRIPTAN SUCCINATE 100 MG: 50 TABLET ORAL at 04:02

## 2022-02-21 RX ADMIN — CLONIDINE HYDROCHLORIDE 0.2 MG: 0.2 TABLET ORAL at 09:02

## 2022-02-21 RX ADMIN — SEVELAMER CARBONATE 800 MG: 800 TABLET, FILM COATED ORAL at 11:02

## 2022-02-21 RX ADMIN — SEVELAMER CARBONATE 800 MG: 800 TABLET, FILM COATED ORAL at 08:02

## 2022-02-21 RX ADMIN — LEVOTHYROXINE SODIUM 50 MCG: 50 TABLET ORAL at 04:02

## 2022-02-21 RX ADMIN — METHOCARBAMOL 750 MG: 750 TABLET ORAL at 09:02

## 2022-02-21 RX ADMIN — ANASTROZOLE 1 MG: 1 TABLET, COATED ORAL at 08:02

## 2022-02-21 RX ADMIN — CLONIDINE HYDROCHLORIDE 0.2 MG: 0.2 TABLET ORAL at 02:02

## 2022-02-21 RX ADMIN — OXYBUTYNIN CHLORIDE 10 MG: 10 TABLET, EXTENDED RELEASE ORAL at 08:02

## 2022-02-21 RX ADMIN — INSULIN ASPART 4 UNITS: 100 INJECTION, SOLUTION INTRAVENOUS; SUBCUTANEOUS at 11:02

## 2022-02-21 RX ADMIN — APIXABAN 2.5 MG: 2.5 TABLET, FILM COATED ORAL at 09:02

## 2022-02-21 RX ADMIN — TRAZODONE HYDROCHLORIDE 100 MG: 100 TABLET ORAL at 09:02

## 2022-02-21 RX ADMIN — METOPROLOL TARTRATE 25 MG: 25 TABLET, FILM COATED ORAL at 09:02

## 2022-02-21 RX ADMIN — SEVELAMER CARBONATE 800 MG: 800 TABLET, FILM COATED ORAL at 05:02

## 2022-02-21 NOTE — PHYSICIAN QUERY
C/O sudden onset of low back pain when he stood from a bent position to lift a 20 lb basket on Friday at 0730.    States pain has increased throughout the week end to 8/10 now    Advil last rocco with some relief    States decrease pain with laying     Denies dysuria    Some relief with use of weight belt   PT Name: Cecile Bowen  MR #: [id number]     Documentation Clarification      CDS/: Shannan Angulo RN               Contact information:[email or web address]    This form is a permanent document in the medical record.     Query Date: February 21, 2022    By submitting this query, we are merely seeking further clarification of documentation. Please utilize your independent clinical judgment when addressing the question(s) below.    The Medical Record reflects the following:    Supporting Clinical Findings Location in Medical Record   Acute cystitis with hematuria  - SIRS: 0/4; hypotension  - Hx of ESBL UTI; completed course of meropenem  - UA with 1+ leuks, 28 RBCs, >100 WBCs  - UCx pending  - Given Vanc in ED  - suprapubic catheter changed in the ED  - Will continue Vanc and add Meropenem given last UCx  - IVF    A motion wire was passed up the stent and up into the kidney and the stent was removed. This passed easily and placement was confirmed using fluoro.  The cystoscope was removed keeping the guidewire in place and the wire was secured to the drape.       An 8 Fr rigid ureteroscope was passed into the patient's bladder alongside the wire under direct vision.  It was then passed through the left ureteral orifice alongside the wire.  A stone was not encountered. A stiff glide wire was then passed up into the kidney.  The ureteroscope was removed keeping the wires in place.       A 12/14 35cm ureteral access sheath was then passed over the free wire under fluoroscopic guidance. Subsequently, the flexible ureteroscope was passed into the patient's ureter through the access sheath under direct vision. Flexible pyeloscopy was performed systematically, no stones were encountered. Purulent debris was present in the renal pelvis, this was irrigated out.      There was what appeared to be sloughed ureteral mucosa, this was grasped with a basket and sent for pathology.     A retrograde pyelogram was performed through  the ureteroscope. There were no filling defects noted and the renal pelvis was opacified. The ureteroscope was removed keeping the wire in place. The entire course of the ureter was visualized as the ureteroscope and access sheath were simultaneously removed. There were no significant ureteral fragments left behind.      A 6 Fr x 24 cm JJ ureteral stent with strings was passed over the wire and up into the renal pelvis using fluoro. When the coil appeared to be in good position in the kidney the wire was removed under continuous fluoro. Good coils were seen in the kidney and the bladder using fluoro.       The suprapubic catheter was then exchanged for a new 16 Fr catheter.  Hosp Med H&P 2/11                    OP note 2/18                                                                            Provider, please specify the source of the acute cystitis diagnosis associated with above clinical findings. Please check all that apply.    [  x  ] UTI due to suprapubic cath - POA   [   ] UTI due to ureteral stent   [   ] Other (please specify): ____________   [   ] Clinically undetermined

## 2022-02-21 NOTE — ASSESSMENT & PLAN NOTE
- HD MWF  - Nephrology following  - continue sevelamer carbonate 800mg TIDW  - improving  - IV hydration following Cystoscope 2/18.  - sCr and potassium elevated 2/19; trial of Lokelma and 1 L NS. Monitor closely  - sCr increasing but potassium improved with Lokelma.

## 2022-02-21 NOTE — PLAN OF CARE
Problem: Adult Inpatient Plan of Care  Goal: Plan of Care Review  Outcome: Ongoing, Progressing     Problem: Adult Inpatient Plan of Care  Goal: Absence of Hospital-Acquired Illness or Injury  Outcome: Ongoing, Progressing     Problem: Diabetes Comorbidity  Goal: Blood Glucose Level Within Targeted Range  Outcome: Ongoing, Progressing     Pt. AAOx4, VSS, no falls or injuries during shift, safety maintained. Call light in reach, bed locked and in lowest position, side rails up x2. Blood glucose monitored and controlled, no acute events. Will continue to monitor.

## 2022-02-21 NOTE — SUBJECTIVE & OBJECTIVE
Telemedicine  This service was provided by Virtual Visit.    Patient was transferred to [facility] Medicine on:  2/17/22     Chief Complaint   Patient presents with    Hypotension     Arrived [facility] from dialysis center with c/o hypotension SBP 80s, sent for possible urosepsis, recent UTI diagnosis, did not receive dialysis today, last dialyzed wednesday       The patient location is: [facility]   Admitted 2/11/2022  8:18 AM  Present with the patient at the time of the telemed/virtual assessment: n/a    Interval History / Events Overnight:   The patient is able to provide adequate history. Additional history was obtained from past medical records. No significant events reported by Nursing. Stent with strings removed this AM.  Patient complains of feeling lethargic. Symptoms have worsened since yesterday. Associated symptoms include: fatigue. Symptoms are increasing in severity.     Lab test(s) reviewed: sCr stable    Review of Systems   Constitutional:  Negative for fever.   Respiratory:  Negative for shortness of breath.    Gastrointestinal:  Positive for constipation.     Objective:     Vital Signs (Most Recent):  Temp: 98.9 °F (37.2 °C) (02/21/22 1111)  Pulse: 68 (02/21/22 1147)  Resp: 20 (02/21/22 1111)  BP: 137/68 (02/21/22 1147)  SpO2: 96 % (02/21/22 1111) Vital Signs (24h Range):  Temp:  [97.8 °F (36.6 °C)-98.9 °F (37.2 °C)] 98.9 °F (37.2 °C)  Pulse:  [64-89] 68  Resp:  [17-20] 20  SpO2:  [93 %-97 %] 96 %  BP: (134-206)/(68-90) 137/68     Weight: 104.8 kg (231 lb)  Body mass index is 40.92 kg/m².    Intake/Output Summary (Last 24 hours) at 2/21/2022 1302  Last data filed at 2/21/2022 1149  Gross per 24 hour   Intake --   Output 3550 ml   Net -3550 ml        Physical Exam  Constitutional:       General: She is not in acute distress.     Appearance: Normal appearance. She is not diaphoretic.   Eyes:      General: Lids are normal. No scleral icterus.        Right eye: No discharge.         Left  eye: No discharge.      Conjunctiva/sclera: Conjunctivae normal.   Cardiovascular:      Rate and Rhythm: Normal rate.   Pulmonary:      Effort: Pulmonary effort is normal. No tachypnea, accessory muscle usage or respiratory distress.   Abdominal:      General: There is no distension.   Skin:     Coloration: Skin is not cyanotic.   Neurological:      Mental Status: She is alert. She is not disoriented.   Psychiatric:         Attention and Perception: Attention normal.         Mood and Affect: Affect normal.         Behavior: Behavior is cooperative.       Significant Labs:     Recent Labs   Lab 09/05/21  2139 12/21/21  0803 01/03/22  0000   HGBA1C 6.6* 5.3 5.4     :   Recent Labs   Lab 02/20/22  1914 02/21/22  0729 02/21/22  1108   POCTGLUCOSE 90 182* 206*       Recent Labs   Lab 02/20/22  0222   WBC 6.78   HGB 8.0*   HCT 25.9*          Recent Labs   Lab 02/15/22  0818 02/16/22  0358 02/17/22  0333 02/18/22  0231 02/19/22  0316 02/20/22  0222 02/20/22  1606 02/21/22  0349   *   < > 132*   < > 127* 132* 131* 132*   K 4.5   < > 4.6   < > 5.5* 4.7 4.7 4.1      < > 100   < > 99 104 101 103   CO2 25   < > 21*   < > 21* 19* 21* 21*   BUN 27*   < > 34*   < > 35* 35* 30* 32*   CREATININE 2.0*   < > 2.1*   < > 2.2* 2.4* 2.0* 2.1*   *   < > 143*   < > 139* 144* 75 130*   CALCIUM 9.5   < > 9.1   < > 8.5* 8.3* 8.8 8.5*   ALBUMIN 2.0*  --  2.0*   < > 1.9* 1.9*  --  1.9*   MG  --   --  2.0  --   --   --   --   --    PHOS 4.4  --  4.5  --   --   --   --   --     < > = values in this interval not displayed.       Recent Labs   Lab 11/1952 11/29/21 0413 12/06/21  0758 01/03/22  0000 01/23/22  0332 02/09/22  0000 02/11/22  0849 02/11/22  1304   PROCAL  --   --  0.69*  --   --   --  0.12  --    LACTATE 0.8  --   --   --   --   --  1.8 1.4   FERRITIN  --    < >  --  353* 812* 470*  --   --     < > = values in this interval not displayed.       Results for orders placed or performed in visit on 02/09/22    Vitamin D   Result Value Ref Range    Vit D, 25-Hydroxy 58.8 30.0 - 100.0 ng/mL     SARS-CoV2 (COVID-19) Qualitative PCR (no units)   Date Value   02/15/2022 Not Detected   12/28/2021 Not Detected   12/21/2021 Not Detected   09/05/2021 Not Detected     POC Rapid COVID (no units)   Date Value   02/11/2022 Negative   01/21/2022 Negative   01/20/2022 Negative   11/28/2021 Negative   09/28/2021 Negative   09/05/2021 Negative   05/14/2021 Negative       ECG Results              EKG 12-lead (Final result)  Result time 02/12/22 09:41:30      Final result by Interface, Lab In [facility] (02/12/22 09:41:30)                   Narrative:    Test Reason :     Vent. Rate : 057 BPM     Atrial Rate : 059 BPM     P-R Int : 184 ms          QRS Dur : 080 ms      QT Int : 426 ms       P-R-T Axes : -40 -13 -15 degrees     QTc Int : 415 ms    Sinus bradycardia  Voltage criteria for left ventricular hypertrophy  Lateral infarct (cited on or before 11-FEB-2022)  Abnormal ECG  When compared with ECG of 11-FEB-2022 08:39,  No significant change was found  Confirmed by KEVYN GARSIA MD ([id number]) on 2/12/2022 9:41:21 AM    Referred By: [email or web address]   SELF           Confirmed By:KEVYN GARSIA MD                                     EKG 12-lead (Final result)  Result time 02/12/22 09:39:20      Final result by Interface, Lab In [facility] (02/12/22 09:39:20)                   Narrative:    Test Reason : I95.9,    Vent. Rate : 066 BPM     Atrial Rate : 068 BPM     P-R Int : 000 ms          QRS Dur : 072 ms      QT Int : 402 ms       P-R-T Axes : 000 -15 -08 degrees     QTc Int : 422 ms    Technically poor ECG tracing  Sinus rhythm  Voltage criteria for left ventricular hypertrophy  Lateral infarct ,age undetermined  Abnormal ECG  When compared with ECG of 21-JAN-2022 12:55,  Questionable change in in R wave progression  Lateral infarct is now Present  T wave inversion now evident in Inferior leads  Confirmed by KEVYN GARSIA MD ([id number]) on 2/12/2022  9:39:06 AM    Referred By: [email or web address]   SELF           Confirmed By:KEVYN GARSIA MD                                    Results for orders placed during the hospital encounter of 11/28/21    Echo    Interpretation Summary  · The left ventricle is normal in size with normal systolic function. The estimated ejection fraction is 60%.  · Normal right ventricular size with normal right ventricular systolic function.  · Normal left ventricular diastolic function.  · Mechanically ventilated; cannot use inferior caval vein diameter to estimate central venous pressure.      SURG FL Surgery Fluoro Usage  See OP Notes for results.     IMPRESSION: See OP Notes for results.     This procedure was auto-finalized by: Virtual Radiologist      Labs and Imaging within the last 24 hours listed above were reviewed.       Diet: Diet diabetic [facility]; 1500 Calorie; Renal; Fluid - 800mL  Significant LDAs:   IV Access Type: Dialysis Access  Urinary Catheter Indication if present: Patient Does Not Have Urinary Catheter  Other Lines/Tubes/Drains: SPC    HIGH RISK CONDITION(S):   Patient has an abrupt change in neurologic status: Weakness     Goals of Care:    Previous admission:  1/21/22  Likely prognosis:  Fair  Code Status: Full Code  Comfort Only: No  Hospice: No  Goals at discharge: remain at home, with physician follow-up    Discharge Planning   FLORENTINO: 2/22/2022     Code Status: Full Code   Is the patient medically ready for discharge?: No    Reason for patient still in hospital (select all that apply): Patient trending condition and Pending disposition  Discharge Plan A: Home Health

## 2022-02-21 NOTE — PROGRESS NOTES
[facility] - Telemetry [facility] ([address] Medicine  Telemedicine Progress Note    Patient Name: Cecile Bowen  MRN: [id number]  Patient Class: IP- Inpatient   Admission Date: 2/11/2022  Length of Stay: 6 days  Attending Physician: Liana Ruiz MD  Primary Care Provider: Kailey Navarro MD    Subjective:     Principal Problem:Acute cystitis with hematuria    HPI:  Cecile Bowen is a 71 y.o. female with PMHx significant for HTN, HLD, hx of ESBL UTI, ESRD on HD admitted to observation for hypotension, found to have a UTI. Patient was at her dialysis clinic today when she was found to have a BP of 76/43 and advised to come to the ED for evaluation. Reports her SBPs usually run in the 120s. Endorses cloudy urine in her catheter bag for the past few days, and reports it was last changed about a week ago. Denies lightheadedness, dizziness, SOB, fatigue, weakness, HA, CP, cough, abdominal pain, n/v. Patient was recently hospitalized for a ESBL UTI for which she completed her course of meropenem. Of note, patient also has a right subclavian dialysis access that was also changed about a week ago.     In the ED, BP 94/24 improved to 102/57. Remaining VSS. WBC 8.06. Lactic 1.8. Procal 0.12. Hgb 8.5 (~BL). . Cr 2.3 (~BL). Glucose 224. UA with 1+ leuks, 28 RBCs, >100 WBCs. CXR with no acute processes. Given Vanc 2g x 1.     Overview/Hospital Course:  Admitted to  for possible UTI. Cultures obtained, suprapubic catheter changes apparently in the ED, and started Abx. Nephrology consulted for HD. Pt continued on broad antibiotics w/improvement in signs and symptoms. Final culture results w/klebsiella pneumonia- susceptible to connie and bactrim. Urology evaluated patient, 02/14. Pt w/h/o Lt ureteral stone- s/p stent placement 06/2021 and replacement 12/2021- intermittently lost to follow-up. Planning for ureteroscopy 02/18 w/definitive stone management and stent removal as patient has high  likelihood to continue to be lost to f/u. Agreed that it was in the best interest of the patient to remain inpatient to undergo further interventions/evaluations via urology. Pt was transitioned to bactrim 02/16- connie stopped.      Telemedicine  This service was provided by Virtual Visit.    Patient was transferred to [facility] on:  2/17/22     Chief Complaint   Patient presents with    Hypotension     [facility] with c/o hypotension SBP 80s, sent for possible urosepsis, recent UTI diagnosis, did not receive dialysis today, last dialyzed wednesday       The patient location is: [facility]   Admitted 2/11/2022  8:18 AM  Present with the patient at the time of the telemed/virtual assessment: n/a    Interval History / Events Overnight:   The patient is able to provide adequate history. Additional history was obtained from past medical records. No significant events reported by Nursing.   Patient complains of nothing specific. Symptoms have improved since yesterday. Associated symptoms include: fatigue. Symptoms are decreasing in severity.     Lab test(s) reviewed: sCr increased    Review of Systems   Constitutional:  Negative for fever.   Respiratory:  Negative for shortness of breath.      Objective:     Vital Signs (Most Recent):  Temp: 98.2 °F (36.8 °C) (02/20/22 0824)  Pulse: 78 (02/20/22 0843)  Resp: 18 (02/20/22 0924)  BP: (!) 146/65 (02/20/22 0843)  SpO2: (!) 94 % (02/20/22 0843) Vital Signs (24h Range):  Temp:  [96.9 °F (36.1 °C)-98.7 °F (37.1 °C)] 98.2 °F (36.8 °C)  Pulse:  [60-84] 78  Resp:  [18-20] 18  SpO2:  [92 %-99 %] 94 %  BP: (131-172)/(60-98) 146/65     Weight: 104.8 kg (231 lb)  Body mass index is 40.92 kg/m².    Intake/Output Summary (Last 24 hours) at 2/20/2022 1044  Last data filed at 2/20/2022 0507  Gross per 24 hour   Intake --   Output 2150 ml   Net -2150 ml        Physical Exam  Constitutional:       General: She is not in acute distress.      Appearance: Normal appearance. She is not diaphoretic.   Eyes:      General: Lids are normal. No scleral icterus.        Right eye: No discharge.         Left eye: No discharge.      Conjunctiva/sclera: Conjunctivae normal.   Cardiovascular:      Rate and Rhythm: Normal rate.   Pulmonary:      Effort: Pulmonary effort is normal. No tachypnea, accessory muscle usage or respiratory distress.   Abdominal:      General: There is no distension.   Skin:     Coloration: Skin is not cyanotic.   Neurological:      Mental Status: She is alert. She is not disoriented.   Psychiatric:         Attention and Perception: Attention normal.         Mood and Affect: Affect normal.         Behavior: Behavior is cooperative.       Significant Labs:     Recent Labs   Lab 09/05/21  2139 12/21/21  0803 01/03/22  0000   HGBA1C 6.6* 5.3 5.4     :   Recent Labs   Lab 02/19/22  1113 02/19/22  1547 02/20/22  0819   POCTGLUCOSE 123* 111* 158*       Recent Labs   Lab 02/14/22  0355 02/20/22  0222   WBC 5.76 6.78   HGB 9.2* 8.0*   HCT 30.4* 25.9*    271       Recent Labs   Lab 02/14/22  0355   GRAN 43.5  2.5   LYMPH 32.5  1.9   MONO 14.1  0.8   EOS 0.5       Recent Labs   Lab 02/15/22  0818 02/16/22  0358 02/17/22  0333 02/18/22  0231 02/18/22  1812 02/19/22  0316 02/20/22  0222   *   < > 132* 130* 131* 127* 132*   K 4.5   < > 4.6 5.3* 5.3* 5.5* 4.7      < > 100 102 104 99 104   CO2 25   < > 21* 20* 19* 21* 19*   BUN 27*   < > 34* 35* 31* 35* 35*   CREATININE 2.0*   < > 2.1* 2.1* 2.1* 2.2* 2.4*   *   < > 143* 131* 197* 139* 144*   CALCIUM 9.5   < > 9.1 8.7 8.5* 8.5* 8.3*   ALBUMIN 2.0*  --  2.0* 1.8*  --  1.9* 1.9*   MG  --   --  2.0  --   --   --   --    PHOS 4.4  --  4.5  --   --   --   --     < > = values in this interval not displayed.       Recent Labs   Lab 11/28/21  1952/21  0413 12/06/21  0758 01/03/22  0000 01/23/22  0332 02/09/22  0000 02/11/22  0849 02/11/22  1304   PROCAL  --   --  0.69*  --   --   --   0.12  --    LACTATE 0.8  --   --   --   --   --  1.8 1.4   FERRITIN  --    < >  --  353* 812* 470*  --   --     < > = values in this interval not displayed.       Results for orders placed or performed in visit on 02/09/22   Vitamin D   Result Value Ref Range    Vit D, 25-Hydroxy 58.8 30.0 - 100.0 ng/mL     SARS-CoV2 (COVID-19) Qualitative PCR (no units)   Date Value   02/15/2022 Not Detected   12/28/2021 Not Detected   12/21/2021 Not Detected   09/05/2021 Not Detected     POC Rapid COVID (no units)   Date Value   02/11/2022 Negative   01/21/2022 Negative   01/20/2022 Negative   11/28/2021 Negative   09/28/2021 Negative   09/05/2021 Negative   05/14/2021 Negative       ECG Results              EKG 12-lead (Final result)  Result time 02/12/22 09:41:30      Final result by Interface, Lab In [facility] (02/12/22 09:41:30)                   Narrative:    Test Reason :     Vent. Rate : 057 BPM     Atrial Rate : 059 BPM     P-R Int : 184 ms          QRS Dur : 080 ms      QT Int : 426 ms       P-R-T Axes : -40 -13 -15 degrees     QTc Int : 415 ms    Sinus bradycardia  Voltage criteria for left ventricular hypertrophy  Lateral infarct (cited on or before 11-FEB-2022)  Abnormal ECG  When compared with ECG of 11-FEB-2022 08:39,  No significant change was found  Confirmed by SUN DANIEL HOMEYAR ([id number]) on 2/12/2022 9:41:21 AM    Referred By: [email or web address]   SELF           Confirmed By:KEVYN GARSIA MD                                     EKG 12-lead (Final result)  Result time 02/12/22 09:39:20      Final result by Interface, Lab In [facility] (02/12/22 09:39:20)                   Narrative:    Test Reason : I95.9,    Vent. Rate : 066 BPM     Atrial Rate : 068 BPM     P-R Int : 000 ms          QRS Dur : 072 ms      QT Int : 402 ms       P-R-T Axes : 000 -15 -08 degrees     QTc Int : 422 ms    Technically poor ECG tracing  Sinus rhythm  Voltage criteria for left ventricular hypertrophy  Lateral infarct ,age undetermined  Abnormal  ECG  When compared with ECG of 21-JAN-2022 12:55,  Questionable change in in R wave progression  Lateral infarct is now Present  T wave inversion now evident in Inferior leads  Confirmed by KEVYN GARSIA MD ([id number]) on 2/12/2022 9:39:06 AM    Referred By: ELVA   SELF           Confirmed By:KEVYN GARSIA MD                                    Results for orders placed during the hospital encounter of 11/28/21    Echo    Interpretation Summary  · The left ventricle is normal in size with normal systolic function. The estimated ejection fraction is 60%.  · Normal right ventricular size with normal right ventricular systolic function.  · Normal left ventricular diastolic function.  · Mechanically ventilated; cannot use inferior caval vein diameter to estimate central venous pressure.      SURG FL Surgery Fluoro Usage  See OP Notes for results.     IMPRESSION: See OP Notes for results.     This procedure was auto-finalized by: Virtual Radiologist      Labs and Imaging within the last 24 hours listed above were reviewed.       Diet: Diet diabetic [facility]; 1500 Calorie; Renal; Fluid - 800mL  Significant LDAs:   IV Access Type: Dialysis Access  Urinary Catheter Indication if present: Patient Does Not Have Urinary Catheter  Other Lines/Tubes/Drains: SPC    HIGH RISK CONDITION(S):   Patient has an abrupt change in neurologic status: Weakness     Goals of Care:    Previous admission:  1/21/22  Likely prognosis:  Fair  Code Status: Full Code  Comfort Only: No  Hospice: No  Goals at discharge: remain at home, with physician follow-up    Discharge Planning   FLORENTINO: 2/22/2022     Code Status: Full Code   Is the patient medically ready for discharge?: No    Reason for patient still in hospital (select all that apply): Patient trending condition and Pending disposition  Discharge Plan A: Home Health          Assessment/Plan:      * Acute cystitis with hematuria  - SIRS: 0/4; hypotension  - Hx of ESBL UTI; completed  course of meropenem  - UA with 1+ leuks, 28 RBCs, >100 WBCs  - UCx w/GNR >100,000- susceptibilities to connie and bactrim- continue antibiotics leading up to ureteroscopy and dc post-op- stop connie - transition to Bactrim DS qday until sat- 02/19  - Given Vanc in ED   - suprapubic catheter changed in the ED, and most recently in OR 2/18    CKD (chronic kidney disease) requiring chronic dialysis  - HD MWF  - Nephrology following  - continue sevelamer carbonate 800mg TIDW  - improving  - IV hydration following Cystoscope 2/18.  - sCr and potassium elevated 2/19; trial of Lokelma and 1 L NS. Monitor closely  - sCr increasing but potassium improved with Lokelma.    Pressure injury of right buttock, stage 3  - present on admission  - wound care consulted, appreciate recs  - turn patient q2h    Urinary tract infection due to ESBL Klebsiella  - hx of    Secondary hypertension  - Continue lopressor, lisinopril and clonidine  - Holding Lasix   - Titrate BP meds accordingly    A-fib  - continue Eliquis 2.5mg BID  - continue Lopressor 25mg BID     Left ureteral stone  - h/o left ureteral stone and indwelling left ureteral stent  - stent placed 06/30/21- pt lost to fu- stent exchanged 12/23/21  - Urology following: plan for ureteroscopy w/laser lithotripsy vs. Stone basket extraction vs. Stent replacement 02/18/22  - Per Urology on 2/18:  Left ureteroscopy, Cystoscopy,  Left ureteral biopsy,  Left JJ ureteral stent exchange,  Left retrograde pyelogram.   - Patient or Nurse to remove stent on strings on Monday morning.    Malignant neoplasm of left breast, estrogen receptor positive  - continue home anastrozole 1mg daily    Chronic pain  - follows w/pain specialist  - Improved back pain   - continue Norco 10mg q6h for moderate pain    Suprapubic catheter  - exchanged Q month    Uncontrolled type 2 diabetes mellitus with stage 4 chronic kidney disease, with long-term current use of insulin  - uncontrolled on admit  - continued  Levemir 13 units daily, Novolog 4 units TIDW, SSI  - up titrate as needed    Mixed migraine and muscle contraction headache  - stable  - home Fioricet and sumatriptan prn    Neurogenic bladder  - noted, has suprapubic catheter   - SPC changed in OR 2/18    Urinary retention  - continue home oxybutynin 10mg daily    Recurrent urinary tract infection  - see above    Hyperlipidemia associated with type 2 diabetes mellitus  - continue Lipitor 80mg daily    Hypothyroidism  - continue home Synthroid 50mcg        Active Hospital Problems    Diagnosis  POA    *Acute cystitis with hematuria [N30.01]  Yes     L ureteroscopy, Cystoscopy, Left ureteral biopsy, Left JJ ureteral stent exchange, Left retrograde pyelogram, Suprapubic catheter exchanged  Antibiotics per ID    -Plan per primary team         CKD (chronic kidney disease) requiring chronic dialysis [N18.6, Z99.2]  Not Applicable    Pressure injury of right buttock, stage 3 [L89.313]  Yes    Urinary tract infection due to ESBL Klebsiella [N39.0, B96.89]  Yes    Secondary hypertension [I15.9]  Yes    A-fib [I48.91]  Yes     Chronic    Left ureteral stone [N20.1]  Yes    Malignant neoplasm of left breast, estrogen receptor positive [C50.912, Z17.0]  Not Applicable     Chronic    Chronic pain [G89.29]  Yes     Chronic    Suprapubic catheter [Z93.59]  Not Applicable     Chronic    Uncontrolled type 2 diabetes mellitus with stage 4 chronic kidney disease, with long-term current use of insulin [E11.22, E11.65, N18.4, Z79.4]  Not Applicable    Mixed migraine and muscle contraction headache [G43.909, G44.209]  Yes    Neurogenic bladder [N31.9]  Yes     Chronic    Urinary retention [R33.9]  Yes    Recurrent urinary tract infection [N39.0]  Yes    Hyperlipidemia associated with type 2 diabetes mellitus [E11.69, E78.5]  Yes     Chronic    Hypothyroidism [E03.9]  Yes     Chronic      Resolved Hospital Problems   No resolved problems to display.       Inpatient  Medications Prescribed for Management of Current Problems:     Scheduled Meds:    anastrozole  1 mg Oral Daily    apixaban  2.5 mg Oral BID    atorvastatin  80 mg Oral Daily    cloNIDine  0.2 mg Oral TID    famotidine  20 mg Oral Daily    insulin aspart U-100  4 Units Subcutaneous TIDWM    insulin detemir U-100  13 Units Subcutaneous Daily    levothyroxine  50 mcg Oral Before breakfast    lisinopriL  40 mg Oral Daily    magnesium oxide  400 mg Oral Daily    metoprolol tartrate  25 mg Oral BID    oxybutynin  10 mg Oral Daily    sevelamer carbonate  800 mg Oral TID WM    sodium zirconium cyclosilicate  10 g Oral TID    venlafaxine  150 mg Oral Daily     Continuous Infusions:   As Needed: acetaminophen, bisacodyL, cloNIDine, dextrose 10%, dextrose 10%, glucagon (human recombinant), glucose, glucose, heparin (porcine), HYDROcodone-acetaminophen, influenza, insulin aspart U-100, magnesium oxide, magnesium oxide, melatonin, methocarbamoL, naloxone, ondansetron, polyethylene glycol, promethazine, sodium chloride 0.9%, sodium chloride 0.9%, sumatriptan, traZODone    VTE Risk Mitigation (From admission, onward)         Ordered     heparin (porcine) injection 1,000 Units  As needed (PRN)         02/12/22 1104     apixaban tablet 2.5 mg  2 times daily         02/11/22 1342     IP VTE HIGH RISK PATIENT  Once         02/11/22 1132     Place sequential compression device  Until discontinued         02/11/22 1132              I have assessed these finding virtually using telemed platform and with assistance of bedside nurse     The attending portion of this evaluation, treatment, and documentation was performed per Liana Ruiz MD via Telemedicine AudioVisual using the secure [employer] software platform with 2 way audio/video. The provider was located off-site and the patient is located in the hospital. The aforementioned video software was utilized to document the relevant history and physical exam    Liana GUEVARA  MD Sara  Department of Hospital Medicine   [facility] - Telemetry Stepdown ([place])

## 2022-02-21 NOTE — ASSESSMENT & PLAN NOTE
- h/o left ureteral stone and indwelling left ureteral stent  - stent placed 06/30/21- pt lost to fu- stent exchanged 12/23/21  - Urology following: plan for ureteroscopy w/laser lithotripsy vs. Stone basket extraction vs. Stent replacement 02/18/22  - Per Urology on 2/18:  Left ureteroscopy, Cystoscopy,  Left ureteral biopsy,  Left JJ ureteral stent exchange,  Left retrograde pyelogram.   - Patient or Nurse to remove stent on strings on Monday morning.

## 2022-02-21 NOTE — TELEPHONE ENCOUNTER
Patient is requesting an appointment next week from a recent hospital stay. Please advise where you would like to see her.

## 2022-02-21 NOTE — PLAN OF CARE
Problem: Impaired Wound Healing  Goal: Optimal Wound Healing  Outcome: Ongoing, Progressing     Problem: Electrolyte Imbalance (Chronic Kidney Disease)  Goal: Electrolyte Balance  Outcome: Ongoing, Progressing  Patient Aox4, remains on RA. HS glucose 90; no insulin coverage required. C/o back pain, norco & robaxin given at bedtime. Wound care performed to sacrum this morning. Imitrex given this morning for c/o migraine. Large hard, formed BM x1 during the shift; miraLAX given for constipation. SP catheter remains in place; gauze dressing clean, dry, intact. Ureteral stent on strings removed this morning @ 0625, per nursing communication order. All needs met overnight.

## 2022-02-22 ENCOUNTER — TELEPHONE (OUTPATIENT)
Dept: NEPHROLOGY | Facility: CLINIC | Age: 72
End: 2022-02-22
Payer: MEDICARE

## 2022-02-22 DIAGNOSIS — N18.4 CKD (CHRONIC KIDNEY DISEASE) STAGE 4, GFR 15-29 ML/MIN: Primary | ICD-10-CM

## 2022-02-22 LAB
ALBUMIN SERPL BCP-MCNC: 2.1 G/DL (ref 3.5–5.2)
ANION GAP SERPL CALC-SCNC: 9 MMOL/L (ref 8–16)
BUN SERPL-MCNC: 29 MG/DL (ref 8–23)
CALCIUM SERPL-MCNC: 9 MG/DL (ref 8.7–10.5)
CHLORIDE SERPL-SCNC: 104 MMOL/L (ref 95–110)
CO2 SERPL-SCNC: 21 MMOL/L (ref 23–29)
CREAT SERPL-MCNC: 1.9 MG/DL (ref 0.5–1.4)
ERYTHROCYTE [DISTWIDTH] IN BLOOD BY AUTOMATED COUNT: 13.9 % (ref 11.5–14.5)
EST. GFR  (AFRICAN AMERICAN): 30.6 ML/MIN/1.73 M^2
EST. GFR  (NON AFRICAN AMERICAN): 26.5 ML/MIN/1.73 M^2
GLUCOSE SERPL-MCNC: 131 MG/DL (ref 70–110)
HCT VFR BLD AUTO: 26.4 % (ref 37–48.5)
HGB BLD-MCNC: 8.3 G/DL (ref 12–16)
MAGNESIUM SERPL-MCNC: 1.8 MG/DL (ref 1.6–2.6)
MCH RBC QN AUTO: 29.1 PG (ref 27–31)
MCHC RBC AUTO-ENTMCNC: 31.4 G/DL (ref 32–36)
MCV RBC AUTO: 93 FL (ref 82–98)
PHOSPHATE SERPL-MCNC: 4.5 MG/DL (ref 2.7–4.5)
PLATELET # BLD AUTO: 290 K/UL (ref 150–450)
PMV BLD AUTO: 9.5 FL (ref 9.2–12.9)
POCT GLUCOSE: 103 MG/DL (ref 70–110)
POCT GLUCOSE: 121 MG/DL (ref 70–110)
POCT GLUCOSE: 131 MG/DL (ref 70–110)
POCT GLUCOSE: 159 MG/DL (ref 70–110)
POTASSIUM SERPL-SCNC: 4.4 MMOL/L (ref 3.5–5.1)
RBC # BLD AUTO: 2.85 M/UL (ref 4–5.4)
SODIUM SERPL-SCNC: 134 MMOL/L (ref 136–145)
WBC # BLD AUTO: 6.22 K/UL (ref 3.9–12.7)

## 2022-02-22 PROCEDURE — 83735 ASSAY OF MAGNESIUM: CPT | Mod: HCNC | Performed by: STUDENT IN AN ORGANIZED HEALTH CARE EDUCATION/TRAINING PROGRAM

## 2022-02-22 PROCEDURE — 80069 RENAL FUNCTION PANEL: CPT | Mod: HCNC | Performed by: STUDENT IN AN ORGANIZED HEALTH CARE EDUCATION/TRAINING PROGRAM

## 2022-02-22 PROCEDURE — 36415 COLL VENOUS BLD VENIPUNCTURE: CPT | Mod: HCNC | Performed by: STUDENT IN AN ORGANIZED HEALTH CARE EDUCATION/TRAINING PROGRAM

## 2022-02-22 PROCEDURE — 94761 N-INVAS EAR/PLS OXIMETRY MLT: CPT | Mod: HCNC

## 2022-02-22 PROCEDURE — 25000003 PHARM REV CODE 250: Mod: HCNC | Performed by: INTERNAL MEDICINE

## 2022-02-22 PROCEDURE — 85027 COMPLETE CBC AUTOMATED: CPT | Mod: HCNC | Performed by: INTERNAL MEDICINE

## 2022-02-22 PROCEDURE — 25000003 PHARM REV CODE 250: Mod: HCNC | Performed by: PHYSICIAN ASSISTANT

## 2022-02-22 PROCEDURE — 25000003 PHARM REV CODE 250: Mod: HCNC | Performed by: STUDENT IN AN ORGANIZED HEALTH CARE EDUCATION/TRAINING PROGRAM

## 2022-02-22 PROCEDURE — 99233 PR SUBSEQUENT HOSPITAL CARE,LEVL III: ICD-10-PCS | Mod: HCNC,95,, | Performed by: INTERNAL MEDICINE

## 2022-02-22 PROCEDURE — 99233 SBSQ HOSP IP/OBS HIGH 50: CPT | Mod: HCNC,95,, | Performed by: INTERNAL MEDICINE

## 2022-02-22 PROCEDURE — 11000001 HC ACUTE MED/SURG PRIVATE ROOM: Mod: HCNC

## 2022-02-22 PROCEDURE — 36415 COLL VENOUS BLD VENIPUNCTURE: CPT | Mod: HCNC | Performed by: INTERNAL MEDICINE

## 2022-02-22 RX ORDER — HYDROCODONE BITARTRATE AND ACETAMINOPHEN 10; 325 MG/1; MG/1
1 TABLET ORAL EVERY 8 HOURS PRN
Qty: 20 TABLET | Refills: 0 | Status: SHIPPED | OUTPATIENT
Start: 2022-02-22 | End: 2022-02-25 | Stop reason: SDUPTHER

## 2022-02-22 RX ORDER — LISINOPRIL 40 MG/1
40 TABLET ORAL DAILY
Qty: 90 TABLET | Refills: 3 | Status: SHIPPED | OUTPATIENT
Start: 2022-02-22 | End: 2022-02-25 | Stop reason: SDUPTHER

## 2022-02-22 RX ORDER — LANOLIN ALCOHOL/MO/W.PET/CERES
400 CREAM (GRAM) TOPICAL DAILY
Qty: 180 TABLET | Refills: 3
Start: 2022-02-22 | End: 2022-07-05

## 2022-02-22 RX ORDER — INSULIN GLARGINE 100 [IU]/ML
8-10 INJECTION, SOLUTION SUBCUTANEOUS DAILY
COMMUNITY
Start: 2021-06-05 | End: 2022-10-04

## 2022-02-22 RX ORDER — INSULIN LISPRO 100 [IU]/ML
2-10 INJECTION, SOLUTION INTRAVENOUS; SUBCUTANEOUS
COMMUNITY
Start: 2021-06-05 | End: 2022-06-13 | Stop reason: SDUPTHER

## 2022-02-22 RX ADMIN — METHOCARBAMOL 750 MG: 750 TABLET ORAL at 08:02

## 2022-02-22 RX ADMIN — CLONIDINE HYDROCHLORIDE 0.2 MG: 0.2 TABLET ORAL at 04:02

## 2022-02-22 RX ADMIN — METOPROLOL TARTRATE 25 MG: 25 TABLET, FILM COATED ORAL at 08:02

## 2022-02-22 RX ADMIN — ANASTROZOLE 1 MG: 1 TABLET, COATED ORAL at 08:02

## 2022-02-22 RX ADMIN — APIXABAN 2.5 MG: 2.5 TABLET, FILM COATED ORAL at 08:02

## 2022-02-22 RX ADMIN — SUMATRIPTAN SUCCINATE 100 MG: 50 TABLET ORAL at 05:02

## 2022-02-22 RX ADMIN — DOCUSATE SODIUM 50 MG AND SENNOSIDES 8.6 MG 1 TABLET: 8.6; 5 TABLET, FILM COATED ORAL at 08:02

## 2022-02-22 RX ADMIN — HYDROCODONE BITARTRATE AND ACETAMINOPHEN 1 TABLET: 10; 325 TABLET ORAL at 08:02

## 2022-02-22 RX ADMIN — ATORVASTATIN CALCIUM 80 MG: 40 TABLET, FILM COATED ORAL at 08:02

## 2022-02-22 RX ADMIN — VENLAFAXINE HYDROCHLORIDE 150 MG: 150 CAPSULE, EXTENDED RELEASE ORAL at 08:02

## 2022-02-22 RX ADMIN — OXYBUTYNIN CHLORIDE 10 MG: 10 TABLET, EXTENDED RELEASE ORAL at 08:02

## 2022-02-22 RX ADMIN — SUMATRIPTAN SUCCINATE 100 MG: 50 TABLET ORAL at 03:02

## 2022-02-22 RX ADMIN — SEVELAMER CARBONATE 800 MG: 800 TABLET, FILM COATED ORAL at 12:02

## 2022-02-22 RX ADMIN — SUMATRIPTAN SUCCINATE 100 MG: 50 TABLET ORAL at 08:02

## 2022-02-22 RX ADMIN — INSULIN ASPART 4 UNITS: 100 INJECTION, SOLUTION INTRAVENOUS; SUBCUTANEOUS at 04:02

## 2022-02-22 RX ADMIN — SEVELAMER CARBONATE 800 MG: 800 TABLET, FILM COATED ORAL at 08:02

## 2022-02-22 RX ADMIN — TRAZODONE HYDROCHLORIDE 100 MG: 100 TABLET ORAL at 08:02

## 2022-02-22 RX ADMIN — LISINOPRIL 40 MG: 20 TABLET ORAL at 08:02

## 2022-02-22 RX ADMIN — FAMOTIDINE 20 MG: 20 TABLET ORAL at 08:02

## 2022-02-22 RX ADMIN — INSULIN ASPART 4 UNITS: 100 INJECTION, SOLUTION INTRAVENOUS; SUBCUTANEOUS at 12:02

## 2022-02-22 RX ADMIN — Medication 400 MG: at 08:02

## 2022-02-22 RX ADMIN — INSULIN ASPART 4 UNITS: 100 INJECTION, SOLUTION INTRAVENOUS; SUBCUTANEOUS at 08:02

## 2022-02-22 RX ADMIN — CLONIDINE HYDROCHLORIDE 0.2 MG: 0.2 TABLET ORAL at 08:02

## 2022-02-22 RX ADMIN — INSULIN DETEMIR 13 UNITS: 100 INJECTION, SOLUTION SUBCUTANEOUS at 08:02

## 2022-02-22 RX ADMIN — SEVELAMER CARBONATE 800 MG: 800 TABLET, FILM COATED ORAL at 04:02

## 2022-02-22 NOTE — PROGRESS NOTES
[facility] [facility]  Telemedicine Progress Note    Patient Name: Cecile Bowen  MRN: [id number]  Patient Class: IP- Inpatient   Admission Date: 2/11/2022  Length of Stay: 7 days  Attending Physician: Liana Ruiz MD  Primary Care Provider: Kailey Navarro MD      Subjective:     Principal Problem:Acute cystitis with hematuria    HPI:  Cecile Bowen is a 71 y.o. female with PMHx significant for HTN, HLD, hx of ESBL UTI, ESRD on HD admitted to observation for hypotension, found to have a UTI. Patient was at her dialysis clinic today when she was found to have a BP of 76/43 and advised to come to the ED for evaluation. Reports her SBPs usually run in the 120s. Endorses cloudy urine in her catheter bag for the past few days, and reports it was last changed about a week ago. Denies lightheadedness, dizziness, SOB, fatigue, weakness, HA, CP, cough, abdominal pain, n/v. Patient was recently hospitalized for a ESBL UTI for which she completed her course of meropenem. Of note, patient also has a right subclavian dialysis access that was also changed about a week ago.     In the ED, BP 94/24 improved to 102/57. Remaining VSS. WBC 8.06. Lactic 1.8. Procal 0.12. Hgb 8.5 (~BL). . Cr 2.3 (~BL). Glucose 224. UA with 1+ leuks, 28 RBCs, >100 WBCs. CXR with no acute processes. Given Vanc 2g x 1.     Overview/Hospital Course:  Admitted to  for possible UTI. Cultures obtained, suprapubic catheter changes apparently in the ED, and started Abx. Nephrology consulted for HD. Pt continued on broad antibiotics w/improvement in signs and symptoms. Final culture results w/klebsiella pneumonia- susceptible to connie and bactrim. Urology evaluated patient, 02/14. Pt w/h/o Lt ureteral stone- s/p stent placement 06/2021 and replacement 12/2021- intermittently lost to follow-up. Planning for ureteroscopy 02/18 w/definitive stone management and stent removal as patient has high  likelihood to continue to be lost to f/u. Agreed that it was in the best interest of the patient to remain inpatient to undergo further interventions/evaluations via urology. Pt was transitioned to bactrim 02/16- connie stopped.      Telemedicine  This service was provided by Virtual Visit.    Patient was transferred to [facility] on:  2/17/22     Chief Complaint   Patient presents with    Hypotension     [facility] with c/o hypotension SBP 80s, sent for possible urosepsis, recent UTI diagnosis, did not receive dialysis today, last dialyzed wednesday       [facility]   Admitted 2/11/2022  8:18 AM  Present with the patient at the time of the telemed/virtual assessment: n/a    Interval History / Events Overnight:   The patient is able to provide adequate history. Additional history was obtained from past medical records. No significant events reported by Nursing. Stent with strings removed this AM.  Patient complains of feeling lethargic. Symptoms have worsened since yesterday. Associated symptoms include: fatigue. Symptoms are increasing in severity.     Lab test(s) reviewed: sCr stable    Review of Systems   Constitutional:  Negative for fever.   Respiratory:  Negative for shortness of breath.    Gastrointestinal:  Positive for constipation.     Objective:     Vital Signs (Most Recent):  Temp: 98.9 °F (37.2 °C) (02/21/22 1111)  Pulse: 68 (02/21/22 1147)  Resp: 20 (02/21/22 1111)  BP: 137/68 (02/21/22 1147)  SpO2: 96 % (02/21/22 1111) Vital Signs (24h Range):  Temp:  [97.8 °F (36.6 °C)-98.9 °F (37.2 °C)] 98.9 °F (37.2 °C)  Pulse:  [64-89] 68  Resp:  [17-20] 20  SpO2:  [93 %-97 %] 96 %  BP: (134-206)/(68-90) 137/68     Weight: 104.8 kg (231 lb)  Body mass index is 40.92 kg/m².    Intake/Output Summary (Last 24 hours) at 2/21/2022 1302  Last data filed at 2/21/2022 1149  Gross per 24 hour   Intake --   Output 3550 ml   Net -3550 ml         Physical Exam  Constitutional:       General: She is not in acute distress.     Appearance: Normal appearance. She is not diaphoretic.   Eyes:      General: Lids are normal. No scleral icterus.        Right eye: No discharge.         Left eye: No discharge.      Conjunctiva/sclera: Conjunctivae normal.   Cardiovascular:      Rate and Rhythm: Normal rate.   Pulmonary:      Effort: Pulmonary effort is normal. No tachypnea, accessory muscle usage or respiratory distress.   Abdominal:      General: There is no distension.   Skin:     Coloration: Skin is not cyanotic.   Neurological:      Mental Status: She is alert. She is not disoriented.   Psychiatric:         Attention and Perception: Attention normal.         Mood and Affect: Affect normal.         Behavior: Behavior is cooperative.       Significant Labs:     Recent Labs   Lab 09/05/21  2139 12/21/21  0803 01/03/22  0000   HGBA1C 6.6* 5.3 5.4     :   Recent Labs   Lab 02/20/22  1914 02/21/22  0729 02/21/22  1108   POCTGLUCOSE 90 182* 206*       Recent Labs   Lab 02/20/22  0222   WBC 6.78   HGB 8.0*   HCT 25.9*          Recent Labs   Lab 02/15/22  0818 02/16/22  0358 02/17/22  0333 02/18/22  0231 02/19/22  0316 02/20/22  0222 02/20/22  1606 02/21/22  0349   *   < > 132*   < > 127* 132* 131* 132*   K 4.5   < > 4.6   < > 5.5* 4.7 4.7 4.1      < > 100   < > 99 104 101 103   CO2 25   < > 21*   < > 21* 19* 21* 21*   BUN 27*   < > 34*   < > 35* 35* 30* 32*   CREATININE 2.0*   < > 2.1*   < > 2.2* 2.4* 2.0* 2.1*   *   < > 143*   < > 139* 144* 75 130*   CALCIUM 9.5   < > 9.1   < > 8.5* 8.3* 8.8 8.5*   ALBUMIN 2.0*  --  2.0*   < > 1.9* 1.9*  --  1.9*   MG  --   --  2.0  --   --   --   --   --    PHOS 4.4  --  4.5  --   --   --   --   --     < > = values in this interval not displayed.       Recent Labs   Lab 11/1952 11/29/21  0413 12/06/21  0758 01/03/22  0000 01/23/22  0332 02/09/22  0000 02/11/22  0849 02/11/22  1304   ELIN  --   --   0.69*  --   --   --  0.12  --    LACTATE 0.8  --   --   --   --   --  1.8 1.4   FERRITIN  --    < >  --  353* 812* 470*  --   --     < > = values in this interval not displayed.       Results for orders placed or performed in visit on 02/09/22   Vitamin D   Result Value Ref Range    Vit D, 25-Hydroxy 58.8 30.0 - 100.0 ng/mL     SARS-CoV2 (COVID-19) Qualitative PCR (no units)   Date Value   02/15/2022 Not Detected   12/28/2021 Not Detected   12/21/2021 Not Detected   09/05/2021 Not Detected     POC Rapid COVID (no units)   Date Value   02/11/2022 Negative   01/21/2022 Negative   01/20/2022 Negative   11/28/2021 Negative   09/28/2021 Negative   09/05/2021 Negative   05/14/2021 Negative       ECG Results              EKG 12-lead (Final result)  Result time 02/12/22 09:41:30      Final result by Interface, Lab In [facility] (02/12/22 09:41:30)                   Narrative:    Test Reason :     Vent. Rate : 057 BPM     Atrial Rate : 059 BPM     P-R Int : 184 ms          QRS Dur : 080 ms      QT Int : 426 ms       P-R-T Axes : -40 -13 -15 degrees     QTc Int : 415 ms    Sinus bradycardia  Voltage criteria for left ventricular hypertrophy  Lateral infarct (cited on or before 11-FEB-2022)  Abnormal ECG  When compared with ECG of 11-FEB-2022 08:39,  No significant change was found  Confirmed by CHAGO GARSIA MDR ([id number]) on 2/12/2022 9:41:21 AM    Referred By: [email or web address]   SELF           Confirmed By:KEVYN GARSIA MD                                     EKG 12-lead (Final result)  Result time 02/12/22 09:39:20      Final result by Interface, Lab In [facility] (02/12/22 09:39:20)                   Narrative:    Test Reason : I95.9,    Vent. Rate : 066 BPM     Atrial Rate : 068 BPM     P-R Int : 000 ms          QRS Dur : 072 ms      QT Int : 402 ms       P-R-T Axes : 000 -15 -08 degrees     QTc Int : 422 ms    Technically poor ECG tracing  Sinus rhythm  Voltage criteria for left ventricular hypertrophy  Lateral infarct ,age  undetermined  Abnormal ECG  When compared with ECG of 21-JAN-2022 12:55,  Questionable change in in R wave progression  Lateral infarct is now Present  T wave inversion now evident in Inferior leads  Confirmed by CHAGO GARSIA MDR ([id number]) on 2/12/2022 9:39:06 AM    Referred By: ELVA   SELF           Confirmed By:KEVYN GARSIA MD                                    Results for orders placed during the hospital encounter of 11/28/21    Echo    Interpretation Summary  · The left ventricle is normal in size with normal systolic function. The estimated ejection fraction is 60%.  · Normal right ventricular size with normal right ventricular systolic function.  · Normal left ventricular diastolic function.  · Mechanically ventilated; cannot use inferior caval vein diameter to estimate central venous pressure.      SURG FL Surgery Fluoro Usage  See OP Notes for results.     IMPRESSION: See OP Notes for results.     This procedure was auto-finalized by: Virtual Radiologist      Labs and Imaging within the last 24 hours listed above were reviewed.       Diet: Diet diabetic [facility]; 1500 Calorie; Renal; Fluid - 800mL  Significant LDAs:   IV Access Type: Dialysis Access  Urinary Catheter Indication if present: Patient Does Not Have Urinary Catheter  Other Lines/Tubes/Drains: SPC    HIGH RISK CONDITION(S):   Patient has an abrupt change in neurologic status: Weakness     Goals of Care:    Previous admission:  1/21/22  Likely prognosis:  Fair  Code Status: Full Code  Comfort Only: No  Hospice: No  Goals at discharge: remain at home, with physician follow-up    Discharge Planning   FLORENTINO: 2/22/2022     Code Status: Full Code   Is the patient medically ready for discharge?: No    Reason for patient still in hospital (select all that apply): Patient trending condition and Pending disposition  Discharge Plan A: Home Health          Assessment/Plan:      * Acute cystitis with hematuria  - SIRS: 0/4; hypotension  - Hx of  ESBL UTI; completed course of meropenem  - UA with 1+ leuks, 28 RBCs, >100 WBCs  - UCx w/GNR >100,000- susceptibilities to connie and bactrim- continue antibiotics leading up to ureteroscopy and dc post-op- stop connie - transition to Bactrim DS qday until sat- 02/19  - Given Vanc in ED   - suprapubic catheter changed in the ED, and most recently in OR 2/18    CKD (chronic kidney disease) requiring chronic dialysis  - HD MWF  - Nephrology following  - continue sevelamer carbonate 800mg TIDW  - improving  - IV hydration following Cystoscope 2/18.  - sCr and potassium elevated 2/19; trial of Lokelma and 1 L NS. Monitor closely  - sCr increasing but potassium improved with Lokelma.  - sCr stable; monitoring without HD or Lokelma    Pressure injury of right buttock, stage 3  - present on admission  - wound care consulted, appreciate recs  - turn patient q2h    Urinary tract infection due to ESBL Klebsiella  - hx of    Secondary hypertension  - Continue lopressor, lisinopril and clonidine  - Holding Lasix   - Titrate BP meds accordingly    A-fib  - continue Eliquis 2.5mg BID  - continue Lopressor 25mg BID     Left ureteral stone  - h/o left ureteral stone and indwelling left ureteral stent  - stent placed 06/30/21- pt lost to fu- stent exchanged 12/23/21  - Urology following: plan for ureteroscopy w/laser lithotripsy vs. Stone basket extraction vs. Stent replacement 02/18/22  - Per Urology on 2/18:  Left ureteroscopy, Cystoscopy,  Left ureteral biopsy,  Left JJ ureteral stent exchange,  Left retrograde pyelogram.   - Stent with strings removed 2/21.    Malignant neoplasm of left breast, estrogen receptor positive  - continue home anastrozole 1mg daily    Chronic pain  - follows w/pain specialist  - Improved back pain   - continue Norco 10mg q6h for moderate pain    Suprapubic catheter  - exchanged Q month    Uncontrolled type 2 diabetes mellitus with stage 4 chronic kidney disease, with long-term current use of insulin  -  uncontrolled on admit  - continued Levemir 13 units daily, Novolog 4 units TIDW, SSI  - up titrate as needed    Mixed migraine and muscle contraction headache  - stable  - home Fioricet and sumatriptan prn    Neurogenic bladder  - noted, has suprapubic catheter   - SPC changed in OR 2/18    Urinary retention  - continue home oxybutynin 10mg daily    Recurrent urinary tract infection  - see above    Hyperlipidemia associated with type 2 diabetes mellitus  - continue Lipitor 80mg daily    Hypothyroidism  - continue home Synthroid 50mcg        Active Hospital Problems    Diagnosis  POA    *Acute cystitis with hematuria [N30.01]  Yes     L ureteroscopy, Cystoscopy, Left ureteral biopsy, Left JJ ureteral stent exchange, Left retrograde pyelogram, Suprapubic catheter exchanged  Antibiotics per ID    -Plan per primary team         CKD (chronic kidney disease) requiring chronic dialysis [N18.6, Z99.2]  Not Applicable    Pressure injury of right buttock, stage 3 [L89.313]  Yes    Urinary tract infection due to ESBL Klebsiella [N39.0, B96.89]  Yes    Secondary hypertension [I15.9]  Yes    A-fib [I48.91]  Yes     Chronic    Left ureteral stone [N20.1]  Yes    Malignant neoplasm of left breast, estrogen receptor positive [C50.912, Z17.0]  Not Applicable     Chronic    Chronic pain [G89.29]  Yes     Chronic    Suprapubic catheter [Z93.59]  Not Applicable     Chronic    Uncontrolled type 2 diabetes mellitus with stage 4 chronic kidney disease, with long-term current use of insulin [E11.22, E11.65, N18.4, Z79.4]  Not Applicable    Mixed migraine and muscle contraction headache [G43.909, G44.209]  Yes    Neurogenic bladder [N31.9]  Yes     Chronic    Urinary retention [R33.9]  Yes    Recurrent urinary tract infection [N39.0]  Yes    Hyperlipidemia associated with type 2 diabetes mellitus [E11.69, E78.5]  Yes     Chronic    Hypothyroidism [E03.9]  Yes     Chronic      Resolved Hospital Problems   No resolved  problems to display.       Inpatient Medications Prescribed for Management of Current Problems:     Scheduled Meds:    anastrozole  1 mg Oral Daily    apixaban  2.5 mg Oral BID    atorvastatin  80 mg Oral Daily    cloNIDine  0.2 mg Oral TID    famotidine  20 mg Oral Daily    insulin aspart U-100  4 Units Subcutaneous TIDWM    insulin detemir U-100  13 Units Subcutaneous Daily    levothyroxine  50 mcg Oral Before breakfast    lisinopriL  40 mg Oral Daily    magnesium oxide  400 mg Oral Daily    metoprolol tartrate  25 mg Oral BID    oxybutynin  10 mg Oral Daily    senna-docusate 8.6-50 mg  1 tablet Oral BID    sevelamer carbonate  800 mg Oral TID WM    venlafaxine  150 mg Oral Daily     Continuous Infusions:   As Needed: acetaminophen, bisacodyL, cloNIDine, dextrose 10%, dextrose 10%, glucagon (human recombinant), glucose, glucose, heparin (porcine), HYDROcodone-acetaminophen, influenza, insulin aspart U-100, magnesium oxide, magnesium oxide, melatonin, methocarbamoL, naloxone, ondansetron, polyethylene glycol, promethazine, sodium chloride 0.9%, sodium chloride 0.9%, sumatriptan, traZODone    VTE Risk Mitigation (From admission, onward)         Ordered     heparin (porcine) injection 1,000 Units  As needed (PRN)         02/12/22 1104     apixaban tablet 2.5 mg  2 times daily         02/11/22 1342     IP VTE HIGH RISK PATIENT  Once         02/11/22 1132     Place sequential compression device  Until discontinued         02/11/22 1132              I have assessed these finding virtually using telemed platform and with assistance of bedside nurse     The attending portion of this evaluation, treatment, and documentation was performed per Liana Ruiz MD via Telemedicine AudioVisual using the secure [employer] software platform with 2 way audio/video. The provider was located off-site and the patient is located in the hospital. The aforementioned video software was utilized to document the relevant  history and physical exam.    Liana Ruiz MD  Department of Hospital Medicine   [facility] ([place])

## 2022-02-22 NOTE — TELEPHONE ENCOUNTER
----- Message from Tala Baltazar sent at 2/22/2022  2:37 PM CST -----  Regarding: appointment  Contact: patient  Patient want to speak with a nurse regarding scheduling hospital follow up appointment, please call back at [phone]    Case number [id number]

## 2022-02-22 NOTE — ASSESSMENT & PLAN NOTE
- h/o left ureteral stone and indwelling left ureteral stent  - stent placed 06/30/21- pt lost to fu- stent exchanged 12/23/21  - Urology following: plan for ureteroscopy w/laser lithotripsy vs. Stone basket extraction vs. Stent replacement 02/18/22  - Per Urology on 2/18:  Left ureteroscopy, Cystoscopy,  Left ureteral biopsy,  Left JJ ureteral stent exchange,  Left retrograde pyelogram.   - Stent with strings removed 2/21.

## 2022-02-22 NOTE — SUBJECTIVE & OBJECTIVE
Telemedicine  This service was provided by Virtual Visit.    Patient was transferred to [facility] on:  2/17/22     Chief Complaint   Patient presents with    Hypotension     Ar[facility] dialysis center with c/o hypotension SBP 80s, sent for possible urosepsis, recent UTI diagnosis, did not receive dialysis today, last dialyzed wednesday       The [facility] location is: [facility]   Admitted 2/11/2022  8:18 AM  Present with the patient at the time of the telemed/virtual assessment: n/a    Interval History / Events Overnight:   The patient is able to provide adequate history. Additional history was obtained from past medical records. No significant events reported by Nursing.   Patient complains of nothing specific. Symptoms have improved since yesterday. Associated symptoms include: fatigue. Symptoms are decreasing in severity.     Lab test(s) reviewed: H&H stable    Review of Systems   Constitutional:  Negative for fever.   Respiratory:  Negative for shortness of breath.      Objective:     Vital Signs (Most Recent):  Temp: 98.4 °F (36.9 °C) (02/22/22 1129)  Pulse: 67 (02/22/22 1129)  Resp: 19 (02/22/22 1129)  BP: (!) 182/80 (02/22/22 1129)  SpO2: 97 % (02/22/22 1129) Vital Signs (24h Range):  Temp:  [97.8 °F (36.6 °C)-99.3 °F (37.4 °C)] 98.4 °F (36.9 °C)  Pulse:  [66-70] 67  Resp:  [18-22] 19  SpO2:  [95 %-97 %] 97 %  BP: (168-203)/(70-87) 182/80     Weight: 104.8 kg (231 lb)  Body mass index is 40.92 kg/m².    Intake/Output Summary (Last 24 hours) at 2/22/2022 1431  Last data filed at 2/21/2022 1725  Gross per 24 hour   Intake --   Output 700 ml   Net -700 ml        Physical Exam  Constitutional:       General: She is not in acute distress.     Appearance: Normal appearance. She is not diaphoretic.   Eyes:      General: Lids are normal. No scleral icterus.        Right eye: No discharge.         Left eye: No discharge.      Conjunctiva/sclera: Conjunctivae normal.   Cardiovascular:       Rate and Rhythm: Normal rate.   Pulmonary:      Effort: Pulmonary effort is normal. No tachypnea, accessory muscle usage or respiratory distress.   Abdominal:      General: There is no distension.   Skin:     Coloration: Skin is not cyanotic.   Neurological:      Mental Status: She is alert. She is not disoriented.   Psychiatric:         Attention and Perception: Attention normal.         Mood and Affect: Affect normal.         Behavior: Behavior is cooperative.       Significant Labs:     Recent Labs   Lab 09/05/21  2139 12/21/21  0803 01/03/22  0000   HGBA1C 6.6* 5.3 5.4     :   Recent Labs   Lab 02/21/22  1909 02/22/22  0733 02/22/22  1123   POCTGLUCOSE 116* 131* 159*       Recent Labs   Lab 02/20/22  0222 02/22/22  0323   WBC 6.78 6.22   HGB 8.0* 8.3*   HCT 25.9* 26.4*    290       Recent Labs   Lab 02/17/22  0333 02/18/22  0231 02/21/22  0349 02/21/22  1626 02/22/22  0808   *   < > 132* 133* 134*   K 4.6   < > 4.1 4.3 4.4      < > 103 102 104   CO2 21*   < > 21* 23 21*   BUN 34*   < > 32* 31* 29*   CREATININE 2.1*   < > 2.1* 2.1* 1.9*   *   < > 130* 104 131*   CALCIUM 9.1   < > 8.5* 9.0 9.0   ALBUMIN 2.0*   < > 1.9* 2.2* 2.1*   MG 2.0  --   --   --  1.8   PHOS 4.5  --   --  4.3 4.5    < > = values in this interval not displayed.       Recent Labs   Lab 11/28/21  1952/21  0413 12/06/21  0758 01/03/22  0000 01/23/22  0332 02/09/22  0000 02/11/22  0849 02/11/22  1304   PROCAL  --   --  0.69*  --   --   --  0.12  --    LACTATE 0.8  --   --   --   --   --  1.8 1.4   FERRITIN  --    < >  --  353* 812* 470*  --   --     < > = values in this interval not displayed.       Results for orders placed or performed in visit on 02/09/22   Vitamin D   Result Value Ref Range    Vit D, 25-Hydroxy 58.8 30.0 - 100.0 ng/mL     SARS-CoV2 (COVID-19) Qualitative PCR (no units)   Date Value   02/15/2022 Not Detected   12/28/2021 Not Detected   12/21/2021 Not Detected   09/05/2021 Not Detected      POC Rapid COVID (no units)   Date Value   02/11/2022 Negative   01/21/2022 Negative   01/20/2022 Negative   11/28/2021 Negative   09/28/2021 Negative   09/05/2021 Negative   05/14/2021 Negative       ECG Results              EKG 12-lead (Final result)  Result time 02/12/22 09:41:30      Final result by Interface, Lab In [facility] (02/12/22 09:41:30)                   Narrative:    Test Reason :     Vent. Rate : 057 BPM     Atrial Rate : 059 BPM     P-R Int : 184 ms          QRS Dur : 080 ms      QT Int : 426 ms       P-R-T Axes : -40 -13 -15 degrees     QTc Int : 415 ms    Sinus bradycardia  Voltage criteria for left ventricular hypertrophy  Lateral infarct (cited on or before 11-FEB-2022)  Abnormal ECG  When compared with ECG of 11-FEB-2022 08:39,  No significant change was found  Confirmed by KEVYN GARSIA MD ([id number]) on 2/12/2022 9:41:21 AM    Referred By: [email or web address]   SELF           Confirmed By:KEVYN GARSIA MD                                     EKG 12-lead (Final result)  Result time 02/12/22 09:39:20      Final result by Interface, Lab In [facility] (02/12/22 09:39:20)                   Narrative:    Test Reason : I95.9,    Vent. Rate : 066 BPM     Atrial Rate : 068 BPM     P-R Int : 000 ms          QRS Dur : 072 ms      QT Int : 402 ms       P-R-T Axes : 000 -15 -08 degrees     QTc Int : 422 ms    Technically poor ECG tracing  Sinus rhythm  Voltage criteria for left ventricular hypertrophy  Lateral infarct ,age undetermined  Abnormal ECG  When compared with ECG of 21-JAN-2022 12:55,  Questionable change in in R wave progression  Lateral infarct is now Present  T wave inversion now evident in Inferior leads  Confirmed by KEVYN GARSIA MD ([id number]) on 2/12/2022 9:39:06 AM    Referred By: [email or web address]   SELF           Confirmed By:KEVYN GARSIA MD                                    Results for orders placed during the hospital encounter of 11/28/21    Echo    Interpretation Summary  · The left ventricle is  normal in size with normal systolic function. The estimated ejection fraction is 60%.  · Normal right ventricular size with normal right ventricular systolic function.  · Normal left ventricular diastolic function.  · Mechanically ventilated; cannot use inferior caval vein diameter to estimate central venous pressure.      SURG FL Surgery Fluoro Usage  See OP Notes for results.     IMPRESSION: See OP Notes for results.     This procedure was auto-finalized by: Virtual Radiologist      Labs and Imaging within the last 24 hours listed above were reviewed.       Diet: Diet diabetic [facility]; 2000 Calorie; Renal; Fluid - 1000mL  Significant LDAs:   IV Access Type: Dialysis Access  Urinary Catheter Indication if present: Patient Does Not Have Urinary Catheter  Other Lines/Tubes/Drains: SPC    HIGH RISK CONDITION(S):   Patient has an abrupt change in neurologic status: Weakness     Goals of Care:    Previous admission:  1/21/22  Likely prognosis:  Fair  Code Status: Full Code  Comfort Only: No  Hospice: No  Goals at discharge: remain at home, with physician follow-up    Discharge Planning   FLORENTINO: 2/23/2022     Code Status: Full Code   Is the patient medically ready for discharge?: No    Reason for patient still in hospital (select all that apply): Patient trending condition and Pending disposition  Discharge Plan A: Home Health

## 2022-02-22 NOTE — ASSESSMENT & PLAN NOTE
- HD MWF  - Nephrology following  - continue sevelamer carbonate 800mg TIDW  - improving  - IV hydration following Cystoscope 2/18.  - sCr and potassium elevated 2/19; trial of Lokelma and 1 L NS. Monitor closely  - sCr increasing but potassium improved with Lokelma.  - sCr stable; monitoring without HD or Lokelma

## 2022-02-23 LAB
ALBUMIN SERPL BCP-MCNC: 1.9 G/DL (ref 3.5–5.2)
ANION GAP SERPL CALC-SCNC: 9 MMOL/L (ref 8–16)
BUN SERPL-MCNC: 28 MG/DL (ref 8–23)
CALCIUM SERPL-MCNC: 8.9 MG/DL (ref 8.7–10.5)
CHLORIDE SERPL-SCNC: 100 MMOL/L (ref 95–110)
CHLORIDE UR-SCNC: 52 MMOL/L (ref 25–200)
CO2 SERPL-SCNC: 21 MMOL/L (ref 23–29)
CREAT SERPL-MCNC: 2 MG/DL (ref 0.5–1.4)
CREAT UR-MCNC: 18 MG/DL (ref 15–325)
CREAT UR-MCNC: 18 MG/DL (ref 15–325)
EST. GFR  (AFRICAN AMERICAN): 28.7 ML/MIN/1.73 M^2
EST. GFR  (NON AFRICAN AMERICAN): 24.9 ML/MIN/1.73 M^2
GLUCOSE SERPL-MCNC: 169 MG/DL (ref 70–110)
OSMOLALITY UR: 189 MOSM/KG (ref 50–1200)
PHOSPHATE SERPL-MCNC: 4.2 MG/DL (ref 2.7–4.5)
POCT GLUCOSE: 159 MG/DL (ref 70–110)
POCT GLUCOSE: 164 MG/DL (ref 70–110)
POCT GLUCOSE: 196 MG/DL (ref 70–110)
POCT GLUCOSE: 290 MG/DL (ref 70–110)
POTASSIUM SERPL-SCNC: 3.9 MMOL/L (ref 3.5–5.1)
POTASSIUM UR-SCNC: <10 MMOL/L (ref 15–95)
PROT UR-MCNC: 193 MG/DL (ref 0–15)
PROT/CREAT UR: 10.72 MG/G{CREAT} (ref 0–0.2)
SODIUM SERPL-SCNC: 130 MMOL/L (ref 136–145)
SODIUM UR-SCNC: 58 MMOL/L (ref 20–250)
URATE UR-MCNC: 13 MG/DL
UUN UR-MCNC: 137 MG/DL (ref 140–1050)

## 2022-02-23 PROCEDURE — 84540 ASSAY OF URINE/UREA-N: CPT | Mod: HCNC | Performed by: STUDENT IN AN ORGANIZED HEALTH CARE EDUCATION/TRAINING PROGRAM

## 2022-02-23 PROCEDURE — 11000001 HC ACUTE MED/SURG PRIVATE ROOM: Mod: HCNC

## 2022-02-23 PROCEDURE — 25000003 PHARM REV CODE 250: Mod: HCNC | Performed by: INTERNAL MEDICINE

## 2022-02-23 PROCEDURE — 80069 RENAL FUNCTION PANEL: CPT | Mod: HCNC | Performed by: INTERNAL MEDICINE

## 2022-02-23 PROCEDURE — 84300 ASSAY OF URINE SODIUM: CPT | Mod: HCNC | Performed by: STUDENT IN AN ORGANIZED HEALTH CARE EDUCATION/TRAINING PROGRAM

## 2022-02-23 PROCEDURE — 84133 ASSAY OF URINE POTASSIUM: CPT | Mod: HCNC | Performed by: STUDENT IN AN ORGANIZED HEALTH CARE EDUCATION/TRAINING PROGRAM

## 2022-02-23 PROCEDURE — 99233 PR SUBSEQUENT HOSPITAL CARE,LEVL III: ICD-10-PCS | Mod: HCNC,95,, | Performed by: INTERNAL MEDICINE

## 2022-02-23 PROCEDURE — 82436 ASSAY OF URINE CHLORIDE: CPT | Mod: HCNC | Performed by: STUDENT IN AN ORGANIZED HEALTH CARE EDUCATION/TRAINING PROGRAM

## 2022-02-23 PROCEDURE — 25000003 PHARM REV CODE 250: Mod: HCNC | Performed by: STUDENT IN AN ORGANIZED HEALTH CARE EDUCATION/TRAINING PROGRAM

## 2022-02-23 PROCEDURE — 83935 ASSAY OF URINE OSMOLALITY: CPT | Mod: HCNC | Performed by: STUDENT IN AN ORGANIZED HEALTH CARE EDUCATION/TRAINING PROGRAM

## 2022-02-23 PROCEDURE — 25000003 PHARM REV CODE 250: Mod: HCNC | Performed by: PHYSICIAN ASSISTANT

## 2022-02-23 PROCEDURE — 99233 SBSQ HOSP IP/OBS HIGH 50: CPT | Mod: HCNC,95,, | Performed by: INTERNAL MEDICINE

## 2022-02-23 PROCEDURE — 84560 ASSAY OF URINE/URIC ACID: CPT | Mod: HCNC | Performed by: STUDENT IN AN ORGANIZED HEALTH CARE EDUCATION/TRAINING PROGRAM

## 2022-02-23 PROCEDURE — 99233 PR SUBSEQUENT HOSPITAL CARE,LEVL III: ICD-10-PCS | Mod: HCNC,,, | Performed by: INTERNAL MEDICINE

## 2022-02-23 PROCEDURE — 99233 SBSQ HOSP IP/OBS HIGH 50: CPT | Mod: HCNC,,, | Performed by: INTERNAL MEDICINE

## 2022-02-23 PROCEDURE — 36415 COLL VENOUS BLD VENIPUNCTURE: CPT | Mod: HCNC | Performed by: INTERNAL MEDICINE

## 2022-02-23 PROCEDURE — 84156 ASSAY OF PROTEIN URINE: CPT | Mod: HCNC | Performed by: STUDENT IN AN ORGANIZED HEALTH CARE EDUCATION/TRAINING PROGRAM

## 2022-02-23 RX ORDER — NIFEDIPINE 60 MG/1
60 TABLET, EXTENDED RELEASE ORAL DAILY
Status: DISCONTINUED | OUTPATIENT
Start: 2022-02-24 | End: 2022-02-24

## 2022-02-23 RX ORDER — NIFEDIPINE 30 MG/1
30 TABLET, EXTENDED RELEASE ORAL DAILY
Status: DISCONTINUED | OUTPATIENT
Start: 2022-02-23 | End: 2022-02-23

## 2022-02-23 RX ORDER — HYDRALAZINE HYDROCHLORIDE 50 MG/1
50 TABLET, FILM COATED ORAL EVERY 8 HOURS PRN
Status: DISCONTINUED | OUTPATIENT
Start: 2022-02-23 | End: 2022-02-23

## 2022-02-23 RX ORDER — TORSEMIDE 5 MG/1
5 TABLET ORAL DAILY
Status: DISCONTINUED | OUTPATIENT
Start: 2022-02-23 | End: 2022-02-25 | Stop reason: HOSPADM

## 2022-02-23 RX ORDER — NIFEDIPINE 30 MG/1
30 TABLET, EXTENDED RELEASE ORAL ONCE
Status: COMPLETED | OUTPATIENT
Start: 2022-02-23 | End: 2022-02-23

## 2022-02-23 RX ORDER — CLONIDINE HYDROCHLORIDE 0.1 MG/1
0.1 TABLET ORAL EVERY 8 HOURS PRN
Status: DISCONTINUED | OUTPATIENT
Start: 2022-02-23 | End: 2022-02-25 | Stop reason: HOSPADM

## 2022-02-23 RX ADMIN — VENLAFAXINE HYDROCHLORIDE 150 MG: 150 CAPSULE, EXTENDED RELEASE ORAL at 09:02

## 2022-02-23 RX ADMIN — CLONIDINE HYDROCHLORIDE 0.2 MG: 0.2 TABLET ORAL at 07:02

## 2022-02-23 RX ADMIN — INSULIN ASPART 4 UNITS: 100 INJECTION, SOLUTION INTRAVENOUS; SUBCUTANEOUS at 12:02

## 2022-02-23 RX ADMIN — INSULIN DETEMIR 13 UNITS: 100 INJECTION, SOLUTION SUBCUTANEOUS at 09:02

## 2022-02-23 RX ADMIN — METOPROLOL TARTRATE 25 MG: 25 TABLET, FILM COATED ORAL at 09:02

## 2022-02-23 RX ADMIN — NIFEDIPINE 30 MG: 30 TABLET, FILM COATED, EXTENDED RELEASE ORAL at 05:02

## 2022-02-23 RX ADMIN — TRAZODONE HYDROCHLORIDE 100 MG: 100 TABLET ORAL at 09:02

## 2022-02-23 RX ADMIN — METOPROLOL TARTRATE 25 MG: 25 TABLET, FILM COATED ORAL at 07:02

## 2022-02-23 RX ADMIN — ATORVASTATIN CALCIUM 80 MG: 40 TABLET, FILM COATED ORAL at 09:02

## 2022-02-23 RX ADMIN — HYDRALAZINE HYDROCHLORIDE 50 MG: 50 TABLET ORAL at 02:02

## 2022-02-23 RX ADMIN — CLONIDINE HYDROCHLORIDE 0.2 MG: 0.2 TABLET ORAL at 02:02

## 2022-02-23 RX ADMIN — APIXABAN 2.5 MG: 2.5 TABLET, FILM COATED ORAL at 09:02

## 2022-02-23 RX ADMIN — Medication 400 MG: at 09:02

## 2022-02-23 RX ADMIN — HYDROCODONE BITARTRATE AND ACETAMINOPHEN 1 TABLET: 10; 325 TABLET ORAL at 09:02

## 2022-02-23 RX ADMIN — DOCUSATE SODIUM 50 MG AND SENNOSIDES 8.6 MG 1 TABLET: 8.6; 5 TABLET, FILM COATED ORAL at 09:02

## 2022-02-23 RX ADMIN — TORSEMIDE 5 MG: 5 TABLET ORAL at 12:02

## 2022-02-23 RX ADMIN — INSULIN ASPART 1 UNITS: 100 INJECTION, SOLUTION INTRAVENOUS; SUBCUTANEOUS at 09:02

## 2022-02-23 RX ADMIN — CLONIDINE HYDROCHLORIDE 0.2 MG: 0.2 TABLET ORAL at 12:02

## 2022-02-23 RX ADMIN — NIFEDIPINE 30 MG: 30 TABLET, FILM COATED, EXTENDED RELEASE ORAL at 07:02

## 2022-02-23 RX ADMIN — INSULIN ASPART 4 UNITS: 100 INJECTION, SOLUTION INTRAVENOUS; SUBCUTANEOUS at 09:02

## 2022-02-23 RX ADMIN — LEVOTHYROXINE SODIUM 50 MCG: 50 TABLET ORAL at 06:02

## 2022-02-23 RX ADMIN — METHOCARBAMOL 750 MG: 750 TABLET ORAL at 09:02

## 2022-02-23 RX ADMIN — NIFEDIPINE 30 MG: 30 TABLET, FILM COATED, EXTENDED RELEASE ORAL at 12:02

## 2022-02-23 RX ADMIN — SUMATRIPTAN SUCCINATE 100 MG: 50 TABLET ORAL at 02:02

## 2022-02-23 RX ADMIN — INSULIN ASPART 4 UNITS: 100 INJECTION, SOLUTION INTRAVENOUS; SUBCUTANEOUS at 05:02

## 2022-02-23 RX ADMIN — ANASTROZOLE 1 MG: 1 TABLET, COATED ORAL at 09:02

## 2022-02-23 RX ADMIN — OXYBUTYNIN CHLORIDE 10 MG: 10 TABLET, EXTENDED RELEASE ORAL at 09:02

## 2022-02-23 RX ADMIN — CLONIDINE HYDROCHLORIDE 0.3 MG: 0.2 TABLET ORAL at 09:02

## 2022-02-23 RX ADMIN — SUMATRIPTAN SUCCINATE 100 MG: 50 TABLET ORAL at 06:02

## 2022-02-23 RX ADMIN — FAMOTIDINE 20 MG: 20 TABLET ORAL at 09:02

## 2022-02-23 RX ADMIN — LISINOPRIL 40 MG: 20 TABLET ORAL at 07:02

## 2022-02-23 NOTE — ASSESSMENT & PLAN NOTE
Current BP meds: clonidine 0.2mg tid, lisinopril 40mg daily, metoprolol 25mg bid   Recommendations:  Start nifedipine 30mg daily and torsemide 5mg daily   Low Na diet and fluid restriction

## 2022-02-23 NOTE — ASSESSMENT & PLAN NOTE
- uncontrolled on admit  - continued Levemir 13 units daily, Novolog 4 units TIDW, SSI  - up titrate as needed  - resume home insulin regimen at discharge

## 2022-02-23 NOTE — TELEPHONE ENCOUNTER
Placed on wait list for March with labs before. Patient no longer has home health at this time. I will check with her to see where she wants to have labs drawn in 3 weeks.

## 2022-02-23 NOTE — PLAN OF CARE
[facility] - Telemetry Stepdown ([place]-)  Discharge Reassessment    Primary Care Provider: Kailey Navarro MD    Expected Discharge Date: 2/24/2022    Reassessment (most recent)     Discharge Reassessment - 02/23/22 1718        Discharge Reassessment    Assessment Type Discharge Planning Reassessment     Did the patient's condition or plan change since previous assessment? No     Discharge Plan A Home with family;Home Health     Discharge Plan B Home with family;Home Health     DME Needed Upon Discharge  other (see comments)   TBD    Discharge Barriers Identified None     Why the patient remains in the hospital Requires continued medical care        Post-Acute Status    Post-Acute Authorization Home Health     Home Health Status Set-up Complete/Auth obtained   Family Home Care    Discharge Delays None known at this time             Patient not stable for discharge per MD due to Hypertension.  CM will continue to follow patient's progress to discharge.  CM remains available for any family concerns or needs.  Rosario Banda, RN, CM  Case Management  [phone]

## 2022-02-23 NOTE — PLAN OF CARE
02/23/22 1328   Post-Acute Status   Post-Acute Authorization Home Health   Home Health Status Referrals Sent   Discharge Plan   Discharge Plan A Home with family;Home Health   Discharge Plan B Home with family;Home Health   Referrals sent out for Home Health (11)    Rosario Banda RN, CM  Case Management  [id number]

## 2022-02-23 NOTE — CARE UPDATE
"RAPID RESPONSE NURSE CHART REVIEW        Chart Reviewed: 02/23/2022, 3:23 AM    MRN: [id number]  Bed: 7067/7067 A    Dx: Acute cystitis with hematuria    Cecile Bowen has a past medical history of Cervicogenic migraine, Chronic pain, CKD (chronic kidney disease) stage 4, GFR 15-29 ml/min, CKD (chronic kidney disease) stage 4, GFR 15-29 ml/min, Diabetes mellitus, Fibromyalgia, Hydronephrosis, Hyperlipidemia, Hypertension, Hypothyroidism, ARON (iron deficiency anemia), Insomnia, Levoscoliosis, Malignant neoplasm of upper-outer quadrant of left breast in female, estrogen receptor positive, Metabolic acidosis, Mobility impaired, Nuclear sclerosis - Both Eyes, VIJAYA (obstructive sleep apnea), Osteopenia, Pulmonary nodule, Recurrent UTI, Renal manifestation of secondary diabetes mellitus, Secondary hyperparathyroidism, renal, and Urinary retention.    Last VS: BP (!) 244/107   Pulse 96   Temp 97.5 °F (36.4 °C) (Oral)   Resp 20   Ht 5' 3" (1.6 m)   Wt 104.8 kg (231 lb)   SpO2 96%   Breastfeeding No   BMI 40.92 kg/m²     24H Vital Sign Range:  Temp:  [97.5 °F (36.4 °C)-98.5 °F (36.9 °C)]   Pulse:  [60-96]   Resp:  [18-20]   BP: (182-244)/()   SpO2:  [95 %-99 %]     Level of Consciousness (AVPU): alert    Recent Labs     02/22/22  0323   WBC 6.22   HGB 8.3*   HCT 26.4*          Recent Labs     02/21/22  0349 02/21/22  1626 02/22/22  0808   * 133* 134*   K 4.1 4.3 4.4    102 104   CO2 21* 23 21*   CREATININE 2.1* 2.1* 1.9*   * 104 131*   PHOS  --  4.3 4.5   MG  --   --  1.8        No results for input(s): PH, PCO2, PO2, HCO3, POCSATURATED, BE in the last 72 hours.     OXYGEN:        O2 Device (Oxygen Therapy): room air    MEWS score: 3    Bedside RNMona. Patient hypertensive, /107. Clonidine and PO hydralazine administered at 0200. Repeat /80. Nurse to re-assess BP and asked to notify primary team and rapid response if BP still outside of parameters. No " additional concerns verbalized at this time. Instructed to call [phone] for further concerns or assistance.    Crow Michel RN

## 2022-02-23 NOTE — ASSESSMENT & PLAN NOTE
- present on admission  - wound care consulted, appreciate recs: Nursing to cleanse buttocks with cleansing cloths or sea cleanse wound cleanser, then apply a thin layer of Triad ointment to affected areas BID/PRN after cleaning. Leave open to air. No cover dressing needed.  - turn patient q2h

## 2022-02-23 NOTE — PLAN OF CARE
Problem: Adult Inpatient Plan of Care  Goal: Plan of Care Review  Outcome: Ongoing, Progressing  Goal: Patient-Specific Goal (Individualized)  Outcome: Ongoing, Progressing  Goal: Absence of Hospital-Acquired Illness or Injury  Outcome: Ongoing, Progressing  Goal: Optimal Comfort and Wellbeing  Outcome: Ongoing, Progressing  Goal: Readiness for Transition of Care  Outcome: Ongoing, Progressing     Problem: Bariatric Environmental Safety  Goal: Safety Maintained with Care  Outcome: Ongoing, Progressing     Problem: Device-Related Complication Risk (Hemodialysis)  Goal: Safe, Effective Therapy Delivery  Outcome: Ongoing, Progressing     Problem: Hemodynamic Instability (Hemodialysis)  Goal: Effective Tissue Perfusion  Outcome: Ongoing, Progressing     Problem: Infection (Hemodialysis)  Goal: Absence of Infection Signs and Symptoms  Outcome: Ongoing, Progressing     Problem: Diabetes Comorbidity  Goal: Blood Glucose Level Within Targeted Range  Outcome: Ongoing, Progressing     Problem: Fluid and Electrolyte Imbalance (Acute Kidney Injury/Impairment)  Goal: Fluid and Electrolyte Balance  Outcome: Ongoing, Progressing     Problem: Renal Function Impairment (Acute Kidney Injury/Impairment)  Goal: Effective Renal Function  Outcome: Ongoing, Progressing     Problem: Infection  Goal: Absence of Infection Signs and Symptoms  Outcome: Ongoing, Progressing     Problem: Impaired Wound Healing  Goal: Optimal Wound Healing  Outcome: Ongoing, Progressing     Problem: Skin Injury Risk Increased  Goal: Skin Health and Integrity  Outcome: Ongoing, Progressing     Problem: Electrolyte Imbalance (Chronic Kidney Disease)  Goal: Electrolyte Balance  Outcome: Ongoing, Progressing     Problem: Hypertension Acute  Goal: Blood Pressure Within Desired Range  Outcome: Ongoing, Progressing     Problem: Fall Injury Risk  Goal: Absence of Fall and Fall-Related Injury  Outcome: Ongoing, Progressing

## 2022-02-23 NOTE — SUBJECTIVE & OBJECTIVE
Interval History:   SBPs elevated in the 200's overnight, she was given hydralazine however her SBPs continue to be elevated in the 180's. Pt denies shortness of breath, chest pain, palpitations.   States following her low Na diet.   Urine output 3.9L/24 hours    Last iHD 2/12 net 2.5L        Review of patient's allergies indicates:  No Known Allergies  Current Facility-Administered Medications   Medication Frequency    acetaminophen tablet 650 mg Q8H PRN    anastrozole tablet 1 mg Daily    apixaban tablet 2.5 mg BID    atorvastatin tablet 80 mg Daily    bisacodyL suppository 10 mg Daily PRN    cloNIDine tablet 0.2 mg TID    dextrose 10% bolus 125 mL PRN    dextrose 10% bolus 250 mL PRN    famotidine tablet 20 mg Daily    glucagon (human recombinant) injection 1 mg PRN    glucose chewable tablet 16 g PRN    glucose chewable tablet 24 g PRN    HYDROcodone-acetaminophen  mg per tablet 1 tablet Q6H PRN    influenza (QUADRIVALENT ADJUVANTED PF) vaccine 0.5 mL vaccine x 1 dose    insulin aspart U-100 pen 0-5 Units QID (AC + HS) PRN    insulin aspart U-100 pen 4 Units TIDWM    insulin detemir U-100 pen 13 Units Daily    levothyroxine tablet 50 mcg Before breakfast    lisinopriL tablet 40 mg Daily    magnesium oxide tablet 400 mg Daily    melatonin tablet 6 mg Nightly PRN    methocarbamoL tablet 750 mg TID PRN    metoprolol tartrate (LOPRESSOR) tablet 25 mg BID    naloxone 0.4 mg/mL injection 0.02 mg PRN    NIFEdipine 24 hr tablet 30 mg Once    [START ON 2/24/2022] NIFEdipine 24 hr tablet 60 mg Daily    ondansetron disintegrating tablet 8 mg Q8H PRN    oxybutynin 24 hr tablet 10 mg Daily    polyethylene glycol packet 17 g Daily PRN    promethazine tablet 25 mg Q6H PRN    senna-docusate 8.6-50 mg per tablet 1 tablet BID    sodium chloride 0.9% flush 10 mL Q8H PRN    sodium chloride 0.9% flush 3 mL PRN    sumatriptan tablet 100 mg BID PRN    torsemide tablet 5 mg Daily    traZODone tablet 100 mg Nightly PRN     venlafaxine 24 hr capsule 150 mg Daily       Objective:     Vital Signs (Most Recent):  Temp: 98.3 °F (36.8 °C) (02/23/22 1146)  Pulse: 66 (02/23/22 1146)  Resp: 19 (02/23/22 1146)  BP: (!) 171/74 (02/23/22 1146)  SpO2: 98 % (02/23/22 1146)  O2 Device (Oxygen Therapy): room air (02/23/22 1037) Vital Signs (24h Range):  Temp:  [97.5 °F (36.4 °C)-98.5 °F (36.9 °C)] 98.3 °F (36.8 °C)  Pulse:  [60-96] 66  Resp:  [18-20] 19  SpO2:  [95 %-99 %] 98 %  BP: (171-244)/() 171/74     Weight: 104.8 kg (231 lb) (02/18/22 1103)  Body mass index is 40.92 kg/m².  Body surface area is 2.16 meters squared.    I/O last 3 completed shifts:  In: -   Out: 3900 [Urine:3900]    Physical Exam  Vitals and nursing note reviewed.   Constitutional:       General: She is not in acute distress.     Appearance: Normal appearance. She is obese. She is not ill-appearing, toxic-appearing or diaphoretic.   Cardiovascular:      Rate and Rhythm: Normal rate and regular rhythm.      Pulses: Normal pulses.      Heart sounds: No murmur heard.     Comments: R IJ Permacath   Pulmonary:      Effort: Pulmonary effort is normal. No respiratory distress.      Breath sounds: No wheezing, rhonchi or rales.   Genitourinary:     Comments: Suprapubic catheter draining yellowish urine   Musculoskeletal:         General: Swelling present. No tenderness or deformity.      Right lower leg: Edema present.      Left lower leg: Edema present.      Comments: Trace edema in LE   Skin:     General: Skin is warm.      Capillary Refill: Capillary refill takes 2 to 3 seconds.      Coloration: Skin is not jaundiced or pale.      Findings: No bruising, erythema, lesion or rash.   Neurological:      Mental Status: She is alert and oriented to person, place, and time.       Significant Labs:  CBC:   Recent Labs   Lab 02/22/22  0323   WBC 6.22   RBC 2.85*   HGB 8.3*   HCT 26.4*      MCV 93   MCH 29.1   MCHC 31.4*       CMP:   Recent Labs   Lab 02/21/22  0349 02/21/22  3854  02/23/22  0342   *   < > 169*   CALCIUM 8.5*   < > 8.9   ALBUMIN 1.9*   < > 1.9*   PROT 5.2*  --   --    *   < > 130*   K 4.1   < > 3.9   CO2 21*   < > 21*      < > 100   BUN 32*   < > 28*   CREATININE 2.1*   < > 2.0*   ALKPHOS 105  --   --    ALT 7*  --   --    AST 10  --   --    BILITOT 0.2  --   --     < > = values in this interval not displayed.       PTH: No results for input(s): PTH in the last 168 hours.  TSH: No results for input(s): TSH in the last 168 hours.  All labs within the past 24 hours have been reviewed.     Significant Imaging:  Labs: Reviewed

## 2022-02-23 NOTE — PROGRESS NOTES
Petros Shaffer - Telemetry [facility] ([address] Medicine  Telemedicine Progress Note    Patient Name: Cecile Bowen  MRN: [id number]  Patient Class: IP- Inpatient   Admission Date: 2/11/2022  Length of Stay: 8 days  Attending Physician: Liana Ruiz MD  Primary Care Provider: Kailey Navarro MD      Subjective:     Principal Problem:Acute cystitis with hematuria    HPI:  Cecile Bowen is a 71 y.o. female with PMHx significant for HTN, HLD, hx of ESBL UTI, ESRD on HD admitted to observation for hypotension, found to have a UTI. Patient was at her dialysis clinic today when she was found to have a BP of 76/43 and advised to come to the ED for evaluation. Reports her SBPs usually run in the 120s. Endorses cloudy urine in her catheter bag for the past few days, and reports it was last changed about a week ago. Denies lightheadedness, dizziness, SOB, fatigue, weakness, HA, CP, cough, abdominal pain, n/v. Patient was recently hospitalized for a ESBL UTI for which she completed her course of meropenem. Of note, patient also has a right subclavian dialysis access that was also changed about a week ago.     In the ED, BP 94/24 improved to 102/57. Remaining VSS. WBC 8.06. Lactic 1.8. Procal 0.12. Hgb 8.5 (~BL). . Cr 2.3 (~BL). Glucose 224. UA with 1+ leuks, 28 RBCs, >100 WBCs. CXR with no acute processes. Given Vanc 2g x 1.       Overview/Hospital Course:  Admitted to  for possible UTI. Cultures obtained, suprapubic catheter changes apparently in the ED, and started Abx. Nephrology consulted for HD. Pt continued on broad antibiotics w/improvement in signs and symptoms. Final culture results w/klebsiella pneumonia- susceptible to connie and bactrim. Urology evaluated patient, 02/14. Pt w/h/o Lt ureteral stone- s/p stent placement 06/2021 and replacement 12/2021- intermittently lost to follow-up. Planning for ureteroscopy 02/18 w/definitive stone management and stent removal as patient has high  likelihood to continue to be lost to f/u. Agreed that it was in the best interest of the patient to remain inpatient to undergo further interventions/evaluations via urology. Pt was transitioned to bactrim 02/16- connie stopped.      Telemedicine  This service was provided by Virtual Visit.    Patient was transferred to [facility] on:  2/17/22     Chief Complaint   Patient presents with    Hypotension     Arrived via [facility] from dialysis center with c/o hypotension SBP 80s, sent for possible urosepsis, recent UTI diagnosis, did not receive dialysis today, last dialyzed wednesday       The patient location is: [facility]   Admitted 2/11/2022  8:18 AM  Present with the patient at the time of the telemed/virtual assessment: n/a    Interval History / Events Overnight:   The patient is able to provide adequate history. Additional history was obtained from past medical records. No significant events reported by Nursing.   Patient complains of nothing specific. Symptoms have improved since yesterday. Associated symptoms include: fatigue. Symptoms are decreasing in severity.     Lab test(s) reviewed: H&H stable    Review of Systems   Constitutional:  Negative for fever.   Respiratory:  Negative for shortness of breath.      Objective:     Vital Signs (Most Recent):  Temp: 98.4 °F (36.9 °C) (02/22/22 1129)  Pulse: 67 (02/22/22 1129)  Resp: 19 (02/22/22 1129)  BP: (!) 182/80 (02/22/22 1129)  SpO2: 97 % (02/22/22 1129) Vital Signs (24h Range):  Temp:  [97.8 °F (36.6 °C)-99.3 °F (37.4 °C)] 98.4 °F (36.9 °C)  Pulse:  [66-70] 67  Resp:  [18-22] 19  SpO2:  [95 %-97 %] 97 %  BP: (168-203)/(70-87) 182/80     Weight: 104.8 kg (231 lb)  Body mass index is 40.92 kg/m².    Intake/Output Summary (Last 24 hours) at 2/22/2022 1431  Last data filed at 2/21/2022 1725  Gross per 24 hour   Intake --   Output 700 ml   Net -700 ml        Physical Exam  Constitutional:       General: She is not in acute distress.      Appearance: Normal appearance. She is not diaphoretic.   Eyes:      General: Lids are normal. No scleral icterus.        Right eye: No discharge.         Left eye: No discharge.      Conjunctiva/sclera: Conjunctivae normal.   Cardiovascular:      Rate and Rhythm: Normal rate.   Pulmonary:      Effort: Pulmonary effort is normal. No tachypnea, accessory muscle usage or respiratory distress.   Abdominal:      General: There is no distension.   Skin:     Coloration: Skin is not cyanotic.   Neurological:      Mental Status: She is alert. She is not disoriented.   Psychiatric:         Attention and Perception: Attention normal.         Mood and Affect: Affect normal.         Behavior: Behavior is cooperative.       Significant Labs:     Recent Labs   Lab 09/05/21  2139 12/21/21  0803 01/03/22  0000   HGBA1C 6.6* 5.3 5.4     :   Recent Labs   Lab 02/21/22  1909 02/22/22  0733 02/22/22  1123   POCTGLUCOSE 116* 131* 159*       Recent Labs   Lab 02/20/22  0222 02/22/22  0323   WBC 6.78 6.22   HGB 8.0* 8.3*   HCT 25.9* 26.4*    290       Recent Labs   Lab 02/17/22  0333 02/18/22  0231 02/21/22  0349 02/21/22  1626 02/22/22  0808   *   < > 132* 133* 134*   K 4.6   < > 4.1 4.3 4.4      < > 103 102 104   CO2 21*   < > 21* 23 21*   BUN 34*   < > 32* 31* 29*   CREATININE 2.1*   < > 2.1* 2.1* 1.9*   *   < > 130* 104 131*   CALCIUM 9.1   < > 8.5* 9.0 9.0   ALBUMIN 2.0*   < > 1.9* 2.2* 2.1*   MG 2.0  --   --   --  1.8   PHOS 4.5  --   --  4.3 4.5    < > = values in this interval not displayed.       Recent Labs   Lab 11/28/21  1952/21  0413 12/06/21  0758 01/03/22  0000 01/23/22  0332 02/09/22  0000 02/11/22  0849 02/11/22  1304   PROCAL  --   --  0.69*  --   --   --  0.12  --    LACTATE 0.8  --   --   --   --   --  1.8 1.4   FERRITIN  --    < >  --  353* 812* 470*  --   --     < > = values in this interval not displayed.       Results for orders placed or performed in visit on 02/09/22   Vitamin D    Result Value Ref Range    Vit D, 25-Hydroxy 58.8 30.0 - 100.0 ng/mL     SARS-CoV2 (COVID-19) Qualitative PCR (no units)   Date Value   02/15/2022 Not Detected   12/28/2021 Not Detected   12/21/2021 Not Detected   09/05/2021 Not Detected     POC Rapid COVID (no units)   Date Value   02/11/2022 Negative   01/21/2022 Negative   01/20/2022 Negative   11/28/2021 Negative   09/28/2021 Negative   09/05/2021 Negative   05/14/2021 Negative       ECG Results              EKG 12-lead (Final result)  Result time 02/12/22 09:41:30      Final result by Interface, Lab In [facility] (02/12/22 09:41:30)                   Narrative:    Test Reason :     Vent. Rate : 057 BPM     Atrial Rate : 059 BPM     P-R Int : 184 ms          QRS Dur : 080 ms      QT Int : 426 ms       P-R-T Axes : -40 -13 -15 degrees     QTc Int : 415 ms    Sinus bradycardia  Voltage criteria for left ventricular hypertrophy  Lateral infarct (cited on or before 11-FEB-2022)  Abnormal ECG  When compared with ECG of 11-FEB-2022 08:39,  No significant change was found  Confirmed by KEVYN GARSIA MD ([id number]) on 2/12/2022 9:41:21 AM    Referred By: [email or web address]   SELF           Confirmed By:KEVYN GARSIA MD                                     EKG 12-lead (Final result)  Result time 02/12/22 09:39:20      Final result by Interface, Lab In [facility] (02/12/22 09:39:20)                   Narrative:    Test Reason : I95.9,    Vent. Rate : 066 BPM     Atrial Rate : 068 BPM     P-R Int : 000 ms          QRS Dur : 072 ms      QT Int : 402 ms       P-R-T Axes : 000 -15 -08 degrees     QTc Int : 422 ms    Technically poor ECG tracing  Sinus rhythm  Voltage criteria for left ventricular hypertrophy  Lateral infarct ,age undetermined  Abnormal ECG  When compared with ECG of 21-JAN-2022 12:55,  Questionable change in in R wave progression  Lateral infarct is now Present  T wave inversion now evident in Inferior leads  Confirmed by KEVYN GARSIA MD ([id number]) on 2/12/2022 9:39:06  AM    Referred By: [email or web address]   SELF           Confirmed By:KEVYN GARSIA MD                                    Results for orders placed during the hospital encounter of 11/28/21    Echo    Interpretation Summary  · The left ventricle is normal in size with normal systolic function. The estimated ejection fraction is 60%.  · Normal right ventricular size with normal right ventricular systolic function.  · Normal left ventricular diastolic function.  · Mechanically ventilated; cannot use inferior caval vein diameter to estimate central venous pressure.      SURG FL Surgery Fluoro Usage  See OP Notes for results.     IMPRESSION: See OP Notes for results.     This procedure was auto-finalized by: Virtual Radiologist      Labs and Imaging within the last 24 hours listed above were reviewed.       Diet: Diet diabetic [facility]; 2000 Calorie; Renal; Fluid - 1000mL  Significant LDAs:   IV Access Type: Dialysis Access  Urinary Catheter Indication if present: Patient Does Not Have Urinary Catheter  Other Lines/Tubes/Drains: SPC    HIGH RISK CONDITION(S):   Patient has an abrupt change in neurologic status: Weakness     Goals of Care:    Previous admission:  1/21/22  Likely prognosis:  Fair  Code Status: Full Code  Comfort Only: No  Hospice: No  Goals at discharge: remain at home, with physician follow-up    Discharge Planning   FLORENTINO: 2/23/2022     Code Status: Full Code   Is the patient medically ready for discharge?: No    Reason for patient still in hospital (select all that apply): Patient trending condition and Pending disposition  Discharge Plan A: Home Health          Assessment/Plan:      * Acute cystitis with hematuria  - SIRS: 0/4; hypotension  - Hx of ESBL UTI; completed course of meropenem  - UA with 1+ leuks, 28 RBCs, >100 WBCs  - UCx w/GNR >100,000- susceptibilities to connie and bactrim- continue antibiotics leading up to ureteroscopy and dc post-op- stop connie - transition to Bactrim DS qday until sat-  02/19  - Given Vanc in ED   - suprapubic catheter changed in the ED, and most recently in OR 2/18    CKD (chronic kidney disease) requiring chronic dialysis  - HD MWF  - Nephrology following  - continue sevelamer carbonate 800mg TIDW  - improving  - IV hydration following Cystoscope 2/18.  - sCr and potassium elevated 2/19; trial of Lokelma and 1 L NS. Monitor closely  - sCr increasing but potassium improved with Lokelma.  - sCr stable; monitoring without HD or Lokelma  - sCr improving  - discontinue sevelamer    Pressure injury of right buttock, stage 3  - present on admission  - wound care consulted, appreciate recs: Nursing to cleanse buttocks with cleansing cloths or sea cleanse wound cleanser, then apply a thin layer of Triad ointment to affected areas BID/PRN after cleaning. Leave open to air. No cover dressing needed.  - turn patient q2h    Urinary tract infection due to ESBL Klebsiella  - hx of    Secondary hypertension  - Continue lopressor, lisinopril and clonidine  - Discontinued Lasix   - Titrate BP meds accordingly    A-fib  - continue Eliquis 2.5mg BID  - continue Lopressor 25mg BID     Left ureteral stone  - h/o left ureteral stone and indwelling left ureteral stent  - stent placed 06/30/21- pt lost to fu- stent exchanged 12/23/21  - Urology following: plan for ureteroscopy w/laser lithotripsy vs. Stone basket extraction vs. Stent replacement 02/18/22  - Per Urology on 2/18:  Left ureteroscopy, Cystoscopy,  Left ureteral biopsy,  Left JJ ureteral stent exchange,  Left retrograde pyelogram.   - Stent with strings removed 2/21.  - Follow up with Urology    Malignant neoplasm of left breast, estrogen receptor positive  - continue home anastrozole 1mg daily    Chronic pain  - follows w/pain specialist  - Improved back pain   - continue Norco 10mg q6h for moderate pain    Suprapubic catheter  - exchanged Q month    Uncontrolled type 2 diabetes mellitus with stage 4 chronic kidney disease, with long-term  current use of insulin  - uncontrolled on admit  - continued Levemir 13 units daily, Novolog 4 units TIDW, SSI  - up titrate as needed  - resume home insulin regimen at discharge    Mixed migraine and muscle contraction headache  - stable  - home Fioricet and sumatriptan prn    Neurogenic bladder  - noted, has suprapubic catheter   - SPC changed in OR 2/18    Urinary retention  - continue home oxybutynin 10mg daily    Recurrent urinary tract infection  - see above    Hyperlipidemia associated with type 2 diabetes mellitus  - continue Lipitor 80mg daily    Hypothyroidism  - continue home Synthroid 50mcg        Active Hospital Problems    Diagnosis  POA    *Acute cystitis with hematuria [N30.01]  Yes     L ureteroscopy, Cystoscopy, Left ureteral biopsy, Left JJ ureteral stent exchange, Left retrograde pyelogram, Suprapubic catheter exchanged  Antibiotics per ID    -Plan per primary team         CKD (chronic kidney disease) requiring chronic dialysis [N18.6, Z99.2]  Not Applicable    Pressure injury of right buttock, stage 3 [L89.313]  Yes    Urinary tract infection due to ESBL Klebsiella [N39.0, B96.89]  Yes    Secondary hypertension [I15.9]  Yes    A-fib [I48.91]  Yes     Chronic    Left ureteral stone [N20.1]  Yes    Malignant neoplasm of left breast, estrogen receptor positive [C50.912, Z17.0]  Not Applicable     Chronic    Chronic pain [G89.29]  Yes     Chronic    Suprapubic catheter [Z93.59]  Not Applicable     Chronic    Uncontrolled type 2 diabetes mellitus with stage 4 chronic kidney disease, with long-term current use of insulin [E11.22, E11.65, N18.4, Z79.4]  Not Applicable    Mixed migraine and muscle contraction headache [G43.909, G44.209]  Yes    Neurogenic bladder [N31.9]  Yes     Chronic    Urinary retention [R33.9]  Yes    Recurrent urinary tract infection [N39.0]  Yes    Hyperlipidemia associated with type 2 diabetes mellitus [E11.69, E78.5]  Yes     Chronic    Hypothyroidism [E03.9]   Yes     Chronic      Resolved Hospital Problems   No resolved problems to display.       Inpatient Medications Prescribed for Management of Current Problems:     Scheduled Meds:    anastrozole  1 mg Oral Daily    apixaban  2.5 mg Oral BID    atorvastatin  80 mg Oral Daily    cloNIDine  0.2 mg Oral TID    famotidine  20 mg Oral Daily    insulin aspart U-100  4 Units Subcutaneous TIDWM    insulin detemir U-100  13 Units Subcutaneous Daily    levothyroxine  50 mcg Oral Before breakfast    lisinopriL  40 mg Oral Daily    magnesium oxide  400 mg Oral Daily    metoprolol tartrate  25 mg Oral BID    oxybutynin  10 mg Oral Daily    senna-docusate 8.6-50 mg  1 tablet Oral BID    venlafaxine  150 mg Oral Daily     Continuous Infusions:   As Needed: acetaminophen, bisacodyL, cloNIDine, dextrose 10%, dextrose 10%, glucagon (human recombinant), glucose, glucose, HYDROcodone-acetaminophen, influenza, insulin aspart U-100, magnesium oxide, magnesium oxide, melatonin, methocarbamoL, naloxone, ondansetron, polyethylene glycol, promethazine, sodium chloride 0.9%, sodium chloride 0.9%, sumatriptan, traZODone    VTE Risk Mitigation (From admission, onward)         Ordered     heparin (porcine) injection 1,000 Units  As needed (PRN)         02/12/22 1104     apixaban tablet 2.5 mg  2 times daily         02/11/22 1342     IP VTE HIGH RISK PATIENT  Once         02/11/22 1132     Place sequential compression device  Until discontinued         02/11/22 1132              I have assessed these finding virtually using telemed platform and with assistance of bedside nurse       The attending portion of this evaluation, treatment, and documentation was performed per Liana Ruiz MD via Telemedicine AudioVisual using the secure [employer] software platform with 2 way audio/video. The provider was located off-site and the patient is located in the hospital. The aforementioned video software was utilized to document the relevant  history and physical exam       Liana Ruiz MD  Department of Hospital Medicine   [facility] - Telemetry Stepdown ([place])

## 2022-02-23 NOTE — ASSESSMENT & PLAN NOTE
70 yo F admitted with concern for sepsis due to UTI. UA with 1+ leuks, 28 RBCs, >100 WBCs.   ZAK on CKD IV secondary to septic shock. Dialysis dependent ZAK since 12/15/21      ESRD on IHD MWF  Out patient HD Center - [facility] / Dr. Hurst   Duration of outpatient dialysis session - 3.5 hrs  EDW: 114 kg   Residual Renal Function (Urine Output) -   Access:R CVC    -No emergent need for clearance and volume management today. Pt has excellent urine output (3.9L in 24 hours) and overall good clearance  -Na is low in the 130's (corrected for hyperglycemia about 131-1332) torsemide will help, she is asymptomatic and can observe closely   -CKD anemia: hgb 8.3 (2/22)  -Mineral & bone disease: phos 4.5, PTH 69, hold phos binders  -Strict Input and Output and chart   -WEIGHT PT PRIOR TO DISCHARGE HOME   -Low NA, K, PO4 diet and fluid restriction please (she has bags of chips and many beverages on her side table)   -pt to f/u outpatient in the [facility] nephrology clinic and then will f/u with Dr. López

## 2022-02-23 NOTE — PROGRESS NOTES
[facility] - Tele[facility] ([place] [address])  Nephrology  Progress Note    Patient Name: Cecile Bowen  MRN: [id number]  Admission Date: 2/11/2022  Hospital Length of Stay: 9 days  Attending Provider: Liana Ruiz MD   Primary Care Physician: Kailey Navarro MD  Principal Problem:Acute cystitis with hematuria    Subjective:     HPI: The patient is a 72 yo F with a history of HTN, HLD, hx of ESBL UTI, ESRD on HD admitted to observation for hypotension. The patient arrived to her dialysis clinic today and was found to be hypotensive in the 70s. Her treatment was canceled and she was instructed to come to the ED for evaluation and treatment. She was also noted to have purulent output in her catheter bag which was changed on arrival to the ED. She was recently hospitalized for ESBL UTI from 1/21-2/3. She currently denies lightheadedness, dizziness, SOB, fatigue, weakness, HA, CP, cough, abdominal pain, n/v. In the ED, BP 94/24 improved to 102/57. Remaining VSS. WBC 8.06. Lactic 1.8. Procal 0.12. Hgb 8.5 (~BL). . Cr 2.3 (~BL). Glucose 224. UA with 1+ leuks, 28 RBCs, >100 WBCs. CXR with no acute processes. Given Vanc 2g x 1.   The patient was initiated on RRT on 12/15/21 due to severe acidosis and hyperkalemia. She has been dialysis dependent since. She was last dialyze on Wednesday with no issue. She has a R CVC. Nephrology consulted for HD dependent ZAK.            Interval History:   SBPs elevated in the 200's overnight, she was given hydralazine however her SBPs continue to be elevated in the 180's. Pt denies shortness of breath, chest pain, palpitations.   States following her low Na diet.   Urine output 3.9L/24 hours    Last iHD 2/12 net 2.5L        Review of patient's allergies indicates:  No Known Allergies  Current Facility-Administered Medications   Medication Frequency    acetaminophen tablet 650 mg Q8H PRN    anastrozole tablet 1 mg Daily    apixaban tablet 2.5 mg BID    atorvastatin  tablet 80 mg Daily    bisacodyL suppository 10 mg Daily PRN    cloNIDine tablet 0.2 mg TID    dextrose 10% bolus 125 mL PRN    dextrose 10% bolus 250 mL PRN    famotidine tablet 20 mg Daily    glucagon (human recombinant) injection 1 mg PRN    glucose chewable tablet 16 g PRN    glucose chewable tablet 24 g PRN    HYDROcodone-acetaminophen  mg per tablet 1 tablet Q6H PRN    influenza (QUADRIVALENT ADJUVANTED PF) vaccine 0.5 mL vaccine x 1 dose    insulin aspart U-100 pen 0-5 Units QID (AC + HS) PRN    insulin aspart U-100 pen 4 Units TIDWM    insulin detemir U-100 pen 13 Units Daily    levothyroxine tablet 50 mcg Before breakfast    lisinopriL tablet 40 mg Daily    magnesium oxide tablet 400 mg Daily    melatonin tablet 6 mg Nightly PRN    methocarbamoL tablet 750 mg TID PRN    metoprolol tartrate (LOPRESSOR) tablet 25 mg BID    naloxone 0.4 mg/mL injection 0.02 mg PRN    NIFEdipine 24 hr tablet 30 mg Once    [START ON 2/24/2022] NIFEdipine 24 hr tablet 60 mg Daily    ondansetron disintegrating tablet 8 mg Q8H PRN    oxybutynin 24 hr tablet 10 mg Daily    polyethylene glycol packet 17 g Daily PRN    promethazine tablet 25 mg Q6H PRN    senna-docusate 8.6-50 mg per tablet 1 tablet BID    sodium chloride 0.9% flush 10 mL Q8H PRN    sodium chloride 0.9% flush 3 mL PRN    sumatriptan tablet 100 mg BID PRN    torsemide tablet 5 mg Daily    traZODone tablet 100 mg Nightly PRN    venlafaxine 24 hr capsule 150 mg Daily       Objective:     Vital Signs (Most Recent):  Temp: 98.3 °F (36.8 °C) (02/23/22 1146)  Pulse: 66 (02/23/22 1146)  Resp: 19 (02/23/22 1146)  BP: (!) 171/74 (02/23/22 1146)  SpO2: 98 % (02/23/22 1146)  O2 Device (Oxygen Therapy): room air (02/23/22 1037) Vital Signs (24h Range):  Temp:  [97.5 °F (36.4 °C)-98.5 °F (36.9 °C)] 98.3 °F (36.8 °C)  Pulse:  [60-96] 66  Resp:  [18-20] 19  SpO2:  [95 %-99 %] 98 %  BP: (171-244)/() 171/74     Weight: 104.8 kg (231 lb)  (02/18/22 1103)  Body mass index is 40.92 kg/m².  Body surface area is 2.16 meters squared.    I/O last 3 completed shifts:  In: -   Out: 3900 [Urine:3900]    Physical Exam  Vitals and nursing note reviewed.   Constitutional:       General: She is not in acute distress.     Appearance: Normal appearance. She is obese. She is not ill-appearing, toxic-appearing or diaphoretic.   Cardiovascular:      Rate and Rhythm: Normal rate and regular rhythm.      Pulses: Normal pulses.      Heart sounds: No murmur heard.     Comments: R IJ Permacath   Pulmonary:      Effort: Pulmonary effort is normal. No respiratory distress.      Breath sounds: No wheezing, rhonchi or rales.   Genitourinary:     Comments: Suprapubic catheter draining yellowish urine   Musculoskeletal:         General: Swelling present. No tenderness or deformity.      Right lower leg: Edema present.      Left lower leg: Edema present.      Comments: Trace edema in LE   Skin:     General: Skin is warm.      Capillary Refill: Capillary refill takes 2 to 3 seconds.      Coloration: Skin is not jaundiced or pale.      Findings: No bruising, erythema, lesion or rash.   Neurological:      Mental Status: She is alert and oriented to person, place, and time.       Significant Labs:  CBC:   Recent Labs   Lab 02/22/22  0323   WBC 6.22   RBC 2.85*   HGB 8.3*   HCT 26.4*      MCV 93   MCH 29.1   MCHC 31.4*       CMP:   Recent Labs   Lab 02/21/22  0349 02/21/22  1626 02/23/22  0342   *   < > 169*   CALCIUM 8.5*   < > 8.9   ALBUMIN 1.9*   < > 1.9*   PROT 5.2*  --   --    *   < > 130*   K 4.1   < > 3.9   CO2 21*   < > 21*      < > 100   BUN 32*   < > 28*   CREATININE 2.1*   < > 2.0*   ALKPHOS 105  --   --    ALT 7*  --   --    AST 10  --   --    BILITOT 0.2  --   --     < > = values in this interval not displayed.       PTH: No results for input(s): PTH in the last 168 hours.  TSH: No results for input(s): TSH in the last 168 hours.  All labs within  the past 24 hours have been reviewed.     Significant Imaging:  Labs: Reviewed    Assessment/Plan:     CKD (chronic kidney disease) requiring chronic dialysis  70 yo F admitted with concern for sepsis due to UTI. UA with 1+ leuks, 28 RBCs, >100 WBCs.   ZAK on CKD IV secondary to septic shock. Dialysis dependent ZAK since 12/15/21      ESRD on IHD MWF  Out patient HD Center - [facility] / Dr. Hurst   Duration of outpatient dialysis session - 3.5 hrs  EDW: 114 kg   Residual Renal Function (Urine Output) -   Access:R CVC    -No emergent need for clearance and volume management today. Pt has excellent urine output (3.9L in 24 hours) and overall good clearance  -Na is low in the 130's (corrected for hyperglycemia about 131-1332) torsemide will help, she is asymptomatic and can observe closely   -CKD anemia: hgb 8.3 (2/22)  -Mineral & bone disease: phos 4.5, PTH 69, hold phos binders  -Strict Input and Output and chart   -WEIGHT PT PRIOR TO DISCHARGE HOME   -Low NA, K, PO4 diet and fluid restriction please (she has bags of chips and many beverages on her side table)   -pt to f/u outpatient in the fellow nephrology clinic and then will f/u with Dr. López     Urinary tract infection due to ESBL Klebsiella  -Plan per primary team       Secondary hypertension  Current BP meds: clonidine 0.2mg tid, lisinopril 40mg daily, metoprolol 25mg bid   Recommendations:  Start nifedipine 30mg daily and torsemide 5mg daily   Low Na diet and fluid restriction     Suprapubic catheter  Exchanged 2/18, yellow urine   -Plan per primary team       Uncontrolled type 2 diabetes mellitus with stage 4 chronic kidney disease, with long-term current use of insulin  Lab Results   Component Value Date    HGBA1C 5.4 01/03/2022     -Plan per primary team           Thank you for your consult. I will follow-up with patient. Please contact us if you have any additional questions.    Rosaline Feliz MD  Nephrology  [facility] - Tele[facility] ([place])

## 2022-02-23 NOTE — TELEPHONE ENCOUNTER
Pt is no longer requiring dialysis as per Nephrology team at [facility]. She will need f/up with me in 2-3 weeks, with labs prior. She will likely have HH for labs, orders in. Ty

## 2022-02-23 NOTE — CARE UPDATE
Nephrology   Care update     S: pt states she is very happy that she will be getting discharged home tomorrow since her kidney function has improved. Pt states she will get home health services as well after discharge.   Pt has no complaints    Pt denies: Fever, chills, shortness of breath, chest pain, palpitations, nausea, vomiting, diarrhea, abd pain        O:     Intake/Output Summary (Last 24 hours) at 2/22/2022 1806  Last data filed at 2/22/2022 1030  Gross per 24 hour   Intake --   Output 2300 ml   Net -2300 ml     Vitals:    02/22/22 0305 02/22/22 0733 02/22/22 1129 02/22/22 1554   BP:  (!) 198/70 (!) 182/80 (!) 190/84   BP Location:  Right arm Right arm Right arm   Patient Position:  Lying Lying Lying   Pulse:  68 67 71   Resp:  20 19 20   Temp:  97.9 °F (36.6 °C) 98.4 °F (36.9 °C) 98.5 °F (36.9 °C)   TempSrc:  Oral Oral Oral   SpO2: 95% 97% 97% 98%   Weight:       Height:           Labs:     Recent Labs   Lab 02/17/22  0333 02/18/22  0231 02/21/22  0349 02/21/22  1626 02/22/22  0808   *   < > 132* 133* 134*   K 4.6   < > 4.1 4.3 4.4      < > 103 102 104   CO2 21*   < > 21* 23 21*   BUN 34*   < > 32* 31* 29*   CREATININE 2.1*   < > 2.1* 2.1* 1.9*   CALCIUM 9.1   < > 8.5* 9.0 9.0   PHOS 4.5  --   --  4.3 4.5    < > = values in this interval not displayed.     Magnesium   Date Value Ref Range Status   02/22/2022 1.8 1.6 - 2.6 mg/dL Final           Brief P.E.   General: AOx4 pleasant in NAD   CV: RRR S1 S2, R IJ permacath w/ dressing c/d/i  Pulm: CTA shaheed  Abd/: suprapubic catheter draining yellowish urine  LE: trace edema (improved)         A/P  Cecile Bowen is a 71 y.o. female w/ PMHx ZAK on CKD IV 2/2 diabetes and recurrent UTIs on HD MWF as of 12/2021. Pt admitted on 2/11/2022 for hypotension found to have UTI. She is s/p L ureteroscopy, Cystoscopy, Left ureteral biopsy, Left JJ ureteral stent exchange, Left retrograde pyelogram, Suprapubic catheter exchanged (2/18/2022)       #ZAK on CKD IV  with progression to ESKD on HD as of 12/2021 however now with signs of renal recovery off iHD as of 2/12/2022     -Patient maybe discharged w/ instructions to f/u with fellows nephrology clinic (with me) in 1 week, next Thursday 03/03/2022. Pt needs to have a renal function panel done in the next 3-4 days (prior to her appt on 3/3)  -she still needs to have a low Na, K, and phos diet as well as fluid restriction 1.5L/day   -pt does not need phos binders    *pt would like to return to Dr. López after her 1 week appointment with me, will message Dr. López to assist with f/u as well      Rosaline Feliz M.D.   Nephrology  [facility]

## 2022-02-23 NOTE — ASSESSMENT & PLAN NOTE
- Continue lopressor, lisinopril and clonidine  - Discontinued Lasix   - Titrate BP meds accordingly

## 2022-02-23 NOTE — NURSING
pt bp 211/90 , administered clonidine 0.2mg rechecked manually 200/102 & with machine 244/107. Notified MD, prescribed Hydralazine 50 mg.....blood pressure recheck manual 180/80, machine 208/83

## 2022-02-23 NOTE — PLAN OF CARE
CASSIUS received a message from [facility]  that the patient's HD chair she was scheduled in on MWF at [facility] is confirmed to return to [facility] on Friday at 7AM. CM contacted patient's sister Kat who she lives with to make her aware of this information and she verbalized understanding.    Rosario Banda RN,   Case Management  [id number]

## 2022-02-23 NOTE — PLAN OF CARE
[facility] - Telemetry Stepdown ([place] [address])      HOME HEALTH ORDERS  FACE TO FACE ENCOUNTER    Patient Name: Cecile Bowen  YOB: 1950    PCP: Kailey Navarro MD   PCP Address: [address] [facility] / [place] [address]  PCP Phone Number: [phone]  PCP Fax: [phone]    Encounter Date: 2/11/22    Admit to Home Health    Diagnoses:  Active Hospital Problems    Diagnosis  POA    *Acute cystitis with hematuria [N30.01]  Yes     L ureteroscopy, Cystoscopy, Left ureteral biopsy, Left JJ ureteral stent exchange, Left retrograde pyelogram, Suprapubic catheter exchanged  Antibiotics per ID    -Plan per primary team         CKD (chronic kidney disease) requiring chronic dialysis [N18.6, Z99.2]  Not Applicable    Pressure injury of right buttock, stage 3 [L89.313]  Yes    Urinary tract infection due to ESBL Klebsiella [N39.0, B96.89]  Yes    Secondary hypertension [I15.9]  Yes    A-fib [I48.91]  Yes     Chronic    Left ureteral stone [N20.1]  Yes    Malignant neoplasm of left breast, estrogen receptor positive [C50.912, Z17.0]  Not Applicable     Chronic    Chronic pain [G89.29]  Yes     Chronic    Suprapubic catheter [Z93.59]  Not Applicable     Chronic    Uncontrolled type 2 diabetes mellitus with stage 4 chronic kidney disease, with long-term current use of insulin [E11.22, E11.65, N18.4, Z79.4]  Not Applicable    Mixed migraine and muscle contraction headache [G43.909, G44.209]  Yes    Neurogenic bladder [N31.9]  Yes     Chronic    Urinary retention [R33.9]  Yes    Recurrent urinary tract infection [N39.0]  Yes    Hyperlipidemia associated with type 2 diabetes mellitus [E11.69, E78.5]  Yes     Chronic    Hypothyroidism [E03.9]  Yes     Chronic      Resolved Hospital Problems   No resolved problems to display.       Follow Up Appointments:  Future Appointments   Date Time Provider Department Center   3/2/2022 10:00 AM Sánchez [place] MD [facility] [place]      Follow-up  Information     Kailey Navarro MD. Schedule an appointment as soon as possible for a visit in 1 week(s).    Specialty: Internal Medicine  Why: For discharge from hospital follow up  Contact information:  [address] [facility]  [place] [address]  [phone]             Michael López MD. Schedule an appointment as soon as possible for a visit in 3 week(s).    Specialty: Nephrology  Why: For discharge from hospital follow up, As previously planned  Contact information:  [address] [facility] [address]  [phone]             Ronel Whittington MD. Schedule an appointment as soon as possible for a visit in 1 week(s).    Specialty: Urology  Why: For discharge from hospital follow up  Contact information:  [address]  [place] [address]  [phone]             Rosaline Feliz MD. Go on 3/3/2022.    Specialty: Nephrology  Why: For discharge from hospital follow up  Contact information:  [address] [facility] [address]  [phone]                         Allergies:Review of patient's allergies indicates:  No Known Allergies    Medications: Review discharge medications with patient and family and provide education.    Current Facility-Administered Medications   Medication Dose Route Frequency Provider Last Rate Last Admin    acetaminophen tablet 650 mg  650 mg Oral Q8H PRN Anders Crane MD   650 mg at 02/17/22 0924    anastrozole tablet 1 mg  1 mg Oral Daily Mally Joyner PA-C   1 mg at 02/22/22 0828    apixaban tablet 2.5 mg  2.5 mg Oral BID Anders Crane MD   2.5 mg at 02/22/22 2010    atorvastatin tablet 80 mg  80 mg Oral Daily Mally Joyner PA-C   80 mg at 02/22/22 0827    bisacodyL suppository 10 mg  10 mg Rectal Daily PRN Mally Joyner PA-C        cloNIDine tablet 0.2 mg  0.2 mg Oral Q8H PRN Anders Crane MD   0.2 mg at 02/16/22 0034    cloNIDine tablet 0.2 mg  0.2 mg Oral TID Anders Crane MD   0.2 mg at 02/22/22 2010    dextrose 10% bolus 125 mL  12.5  g Intravenous PRN Anders Crane MD        dextrose 10% bolus 250 mL  25 g Intravenous PRN Anders Crane MD        famotidine tablet 20 mg  20 mg Oral Daily Mally Joyner PA-C   20 mg at 02/22/22 0828    glucagon (human recombinant) injection 1 mg  1 mg Intramuscular PRN Mally Joyner PA-C        glucose chewable tablet 16 g  16 g Oral PRN Mally Joyner PA-C        glucose chewable tablet 24 g  24 g Oral PRN Mally Joyner PA-C        HYDROcodone-acetaminophen  mg per tablet 1 tablet  1 tablet Oral Q6H PRN Anders Crane MD   1 tablet at 02/22/22 2010    influenza (QUADRIVALENT ADJUVANTED PF) vaccine 0.5 mL  0.5 mL Intramuscular vaccine x 1 dose Anders Crane MD        insulin aspart U-100 pen 0-5 Units  0-5 Units Subcutaneous QID (AC + HS) PRN Anders Crane MD   2 Units at 02/18/22 1651    insulin aspart U-100 pen 4 Units  4 Units Subcutaneous TIDWM Anders Crane MD   4 Units at 02/22/22 1624    insulin detemir U-100 pen 13 Units  13 Units Subcutaneous Daily Anders Crane MD   13 Units at 02/22/22 0829    levothyroxine tablet 50 mcg  50 mcg Oral Before breakfast Mally Joyner PA-C   50 mcg at 02/21/22 0439    lisinopriL tablet 40 mg  40 mg Oral Daily Anders Crane MD   40 mg at 02/22/22 0828    magnesium oxide tablet 400 mg  400 mg Oral Daily Anders Crane MD   400 mg at 02/22/22 0827    magnesium oxide tablet 800 mg  800 mg Oral PRN Mally Joyner PA-C        magnesium oxide tablet 800 mg  800 mg Oral PRN Mally Joyner PA-C        melatonin tablet 6 mg  6 mg Oral Nightly PRN Mally Joyner PA-C        methocarbamoL tablet 750 mg  750 mg Oral TID PRN Mally Joyner PA-C   750 mg at 02/22/22 2010    metoprolol tartrate (LOPRESSOR) tablet 25 mg  25 mg Oral BID Anders Crane MD   25 mg at 02/22/22 2010    naloxone 0.4 mg/mL injection 0.02 mg  0.02 mg Intravenous PRN Mally Joyner PA-C        ondansetron disintegrating tablet 8 mg  8 mg Oral Q8H PRN Mally Joyner PA-C         oxybutynin 24 hr tablet 10 mg  10 mg Oral Daily Mally Joyner PA-C   10 mg at 02/22/22 0827    polyethylene glycol packet 17 g  17 g Oral Daily PRN Mally Joyner PA-C   17 g at 02/21/22 0545    promethazine tablet 25 mg  25 mg Oral Q6H PRN Mally Joyner PA-C        senna-docusate 8.6-50 mg per tablet 1 tablet  1 tablet Oral BID Liana Ruiz MD   1 tablet at 02/22/22 2010    sodium chloride 0.9% flush 10 mL  10 mL Intravenous Q8H PRN Mally Joyner PA-C   10 mL at 02/17/22 0841    sodium chloride 0.9% flush 3 mL  3 mL Intravenous PRN Alex Alba Jr., MD        sumatriptan tablet 100 mg  100 mg Oral BID PRN Mally Joyner PA-C   100 mg at 02/22/22 2009    traZODone tablet 100 mg  100 mg Oral Nightly PRN Mally Joyner PA-C   100 mg at 02/22/22 2010    venlafaxine 24 hr capsule 150 mg  150 mg Oral Daily Mally Joyner PA-C   150 mg at 02/22/22 0827     Current Discharge Medication List      START taking these medications    Details   HYDROcodone-acetaminophen (NORCO)  mg per tablet Take 1 tablet by mouth every 8 (eight) hours as needed for Pain.  Qty: 20 tablet, Refills: 0    Comments: Quantity prescribed more than 7 day supply? No         CONTINUE these medications which have CHANGED    Details   lisinopriL (PRINIVIL,ZESTRIL) 40 MG tablet Take 1 tablet (40 mg total) by mouth once daily.  Qty: 90 tablet, Refills: 3    Comments: .      magnesium oxide (MAG-OX) 400 mg (241.3 mg magnesium) tablet Take 1 tablet (400 mg total) by mouth once daily.  Qty: 180 tablet, Refills: 3         CONTINUE these medications which have NOT CHANGED    Details   anastrozole (ARIMIDEX) 1 mg Tab TAKE 1 TABLET BY MOUTH EVERY DAY  Qty: 90 tablet, Refills: 2    Associated Diagnoses: Malignant neoplasm of upper-outer quadrant of left breast in female, estrogen receptor positive      apixaban (ELIQUIS) 2.5 mg Tab Take 2.5 mg by mouth 2 (two) times daily.      atorvastatin (LIPITOR) 80 MG tablet Take 1 tablet  (80 mg total) by mouth once daily.  Qty: 90 tablet, Refills: 3      butalbital-acetaminophen-caffeine -40 mg (FIORICET, ESGIC) -40 mg per tablet TAKE 1 TABLET BY MOUTH WHEN NOT CONTROLLED BY OTHER MEDS. NO MORE THAN 10 TABS PER 30 DAYS  Qty: 10 tablet, Refills: 0      cloNIDine (CATAPRES) 0.2 MG tablet Take 1 tablet (0.2 mg total) by mouth 3 (three) times daily.  Qty: 90 tablet, Refills: 11      diclofenac sodium (ARTHRITIS PAIN, DICLOFENAC,) 1 % Gel Apply to chest wall as needed for arthritis      ergocalciferol (ERGOCALCIFEROL) 50,000 unit Cap TAKE 1 CAPSULE (50,000 UNITS TOTAL) BY MOUTH EVERY 7 DAYS. TAKE ONE CAPSULE BY MOUTH ONE TIME PER WEEK, TAKES ON TUESDAYS  Qty: 12 capsule, Refills: 3      famotidine (PEPCID) 20 MG tablet Take 1 tablet (20 mg total) by mouth once daily.  Qty: 30 tablet, Refills: 11      gemfibroziL (LOPID) 600 MG tablet Take 1 tablet (600 mg total) by mouth every Mon, Wed, Fri.  Qty: 36 tablet, Refills: 3      insulin (LANTUS SOLOSTAR U-100 INSULIN) glargine 100 units/mL (3mL) SubQ pen Inject 12 Units into the skin once daily.      insulin lispro (HUMALOG U-100 INSULIN) 100 unit/mL injection Inject 12 Units into the skin 3 (three) times daily with meals.      methocarbamoL (ROBAXIN) 750 MG Tab TAKE 1-2 TABLETS (750-1,500 MG TOTAL) BY MOUTH 3 (THREE) TIMES DAILY AS NEEDED.  Qty: 180 tablet, Refills: 1    Associated Diagnoses: Chronic midline low back pain without sciatica; Chronic neck pain; Fibromyalgia      metoprolol tartrate (LOPRESSOR) 25 MG tablet Take 1 tablet (25 mg total) by mouth 2 (two) times daily.  Qty: 60 tablet, Refills: 11    Comments: .      scopolamine (TRANSDERM-SCOP TD) Place 1 patch onto the skin as needed (When cruising).      SODIUM BICARBONATE ORAL Take 1 tablet by mouth once daily.      sumatriptan (IMITREX) 100 MG tablet TAKE 1 TABLET (100 MG TOTAL) BY MOUTH 2 (TWO) TIMES DAILY AS NEEDED FOR MIGRAINE.  Qty: 9 tablet, Refills: 11    Associated Diagnoses:  "Intractable chronic migraine without aura and with status migrainosus      traZODone (DESYREL) 100 MG tablet Take 1 tablet (100 mg total) by mouth nightly as needed for Insomnia.  Qty: 90 tablet, Refills: 0      venlafaxine (EFFEXOR-XR) 150 MG Cp24 Take 1 capsule (150 mg total) by mouth once daily.  Qty: 30 capsule, Refills: 11      albuterol (PROVENTIL HFA) 90 mcg/actuation inhaler Inhale 2 puffs into the lungs every 6 (six) hours as needed for Wheezing or Shortness of Breath. Rescue  Qty: 8.5 g, Refills: 0      BD INSULIN SYRINGE ULTRA-FINE 0.5 mL 31 gauge x 5/16" Syrg USE WITH INSULIN 4 TIMES A DAY  Qty: 100 each, Refills: 12      !! blood sugar diagnostic Strp ONE TOUCH ULTRA Check blood sugars 1-2 x a day  Qty: 50 strip, Refills: 6    Comments: ONE TOUCH ULTRA Check blood sugars 1-2 x a day  Associated Diagnoses: Uncontrolled type 2 diabetes mellitus with chronic kidney disease, with long-term current use of insulin      !! blood sugar diagnostic Strp To check BG 4  times daily, to use with insurance preferred meter  Qty: 400 each, Refills: 3      blood-glucose meter kit To check BG 4 times daily, to use with insurance preferred meter  Qty: 1 each, Refills: 0      !! lancets Misc One touch ultra, checks 1-2 x a day  Qty: 50 each, Refills: 6      !! lancets Misc To check BG 4 times daily, to use with insurance preferred meter  Qty: 400 each, Refills: 3      levothyroxine (SYNTHROID) 50 MCG tablet Take 1 tablet (50 mcg total) by mouth before breakfast. Administer on an empty stomach at least 30 minutes before food. If receiving tube feeds, HOLD tube feeds for 1 hour before and after levothyroxine administration.  Qty: 90 tablet, Refills: 2      oxybutynin (DITROPAN-XL) 10 MG 24 hr tablet TAKE 1 TABLET BY MOUTH EVERY DAY  Qty: 90 tablet, Refills: 1    Associated Diagnoses: Bladder spasms      pen needle, diabetic (BD ULTRA-FINE SHORT PEN NEEDLE) 31 gauge x 5/16" Ndle USE WITH LANTUS DAILY, e 11.65  Qty: 100 each, " Refills: 3    Comments: DX Code Needed  PATIENT IS REQUESTING A REFILL FOR THIS RX.  Associated Diagnoses: Type 2 diabetes mellitus not at goal      polyethylene glycol (GLYCOLAX) 17 gram PwPk One packet QD prn constipation  Qty: 30 packet, Refills: 6       !! - Potential duplicate medications found. Please discuss with provider.      STOP taking these medications       furosemide (LASIX) 80 MG tablet Comments:   Reason for Stopping:         oxyCODONE-acetaminophen (PERCOCET)  mg per tablet Comments:   Reason for Stopping:         sevelamer carbonate (RENVELA) 800 mg Tab Comments:   Reason for Stopping:                 I have seen and examined this patient within the last 30 days. My clinical findings that support the need for the home health skilled services and home bound status are the following:no   Weakness/numbness causing balance and gait disturbance due to Weakness/Debility making it taxing to leave home.     Diet:   diabetic diet 2000 calorie, renal diet, fluid restriction 1.5 L, 2 gram sodium diet and low potassium diet    Labs: CBC, Renal panel, Mag on 2/28.  Report Lab results to Dr. Rosaline Feliz, [facility]: fax [phone]    Referrals/ Consults  Physical Therapy to evaluate and treat. Evaluate for home safety and equipment needs; Establish/upgrade home exercise program. Perform / instruct on therapeutic exercises, gait training, transfer training, and Range of Motion.  Occupational Therapy to evaluate and treat. Evaluate home environment for safety and equipment needs. Perform/Instruct on transfers, ADL training, ROM, and therapeutic exercises.   to evaluate for community resources/long-range planning.    Activities:   activity as tolerated    Nursing:   Agency to admit patient within 24 hours of hospital discharge unless specified on physician order or at patient request    SN to complete comprehensive assessment including routine vital signs. Instruct on disease process and s/s  of complications to report to MD. Review/verify medication list sent home with the patient at time of discharge  and instruct patient/caregiver as needed. Frequency may be adjusted depending on start of care date.     Skilled nurse to perform up to 3 visits PRN for symptoms related to diagnosis    When multiple disciplines ordered:    Start of Care occurs on Sunday - Wednesday schedule remaining discipline evaluations as ordered on separate consecutive days following the start of care.    Thursday SOC -schedule subsequent evaluations Friday and Monday the following week.     Friday - Saturday SOC - schedule subsequent discipline evaluations on consecutive days starting Monday of the following week.    Notify MD if SBP > 160 or < 90; DBP > 90 or < 50; HR > 120 or < 50; Temp > 101; O2 < 88%    Ok to schedule additional visits based on staff availability and patient request on consecutive days within the home health episode.    For all post-discharge communication and subsequent orders please contact patient's primary care physician. If unable to reach primary care physician or do not receive response within 30 minutes, please contact [facility] On Call RN for clinical staff order clarification    Miscellaneous   SPC Care: Instruct patient/caregiver to empty Tiwari bag.  Change SPC every month. Last change on 2/18/2022  Diabetic Care:   SN to perform and educate Diabetic management with blood glucose monitoring:  Fingerstick blood sugar AC and HS and Report CBG < 70 or > 300 to physician / PCP.    Home Health Aide:  Nursing Weekly, Physical Therapy Three times weekly, Occupational Therapy Three times weekly and Medical Social Work Weekly    Wound Care Orders  Nursing to cleanse buttocks with cleansing cloths or sea cleanse wound cleanser, then apply a thin layer of Triad ointment to affected areas BID/PRN after cleaning. Leave open to air. No cover dressing needed.    I certify that this patient is confined to her home and  needs intermittent skilled nursing care, physical therapy and occupational therapy.      Liana Ruiz MD

## 2022-02-23 NOTE — NURSING
Pt refused Levothyroxine. Would prefer to take when breakfast arrives. Will inform day shift nurse to administer.

## 2022-02-23 NOTE — SUBJECTIVE & OBJECTIVE
Telemedicine  This service was provided by Virtual Visit.    Patient was transferred to [facility] on:  2/17/22     Chief Complaint   Patient presents with    Hypotension     Arrived via [facility] from dialysis center with c/o hypotension SBP 80s, sent for possible urosepsis, recent UTI diagnosis, did not receive dialysis today, last dialyzed wednesday       The [facility]: [facility]   Admitted 2/11/2022  8:18 AM  Present with the patient at the time of the telemed/virtual assessment: n/a    Interval History / Events Overnight:   The patient is able to provide adequate history. Additional history was obtained from past medical records. On Call Provider contacted about SBP > 200.    Patient complains of nothing specific. Symptoms have improved since yesterday. Associated symptoms include: fatigue. Symptoms are decreasing in severity.     Lab test(s) reviewed: H&H stable    Review of Systems   Constitutional:  Negative for fever.   Respiratory:  Negative for shortness of breath.      Objective:     Vital Signs (Most Recent):  Temp: 98.3 °F (36.8 °C) (02/23/22 1146)  Pulse: 66 (02/23/22 1146)  Resp: 19 (02/23/22 1146)  BP: (!) 171/74 (02/23/22 1146)  SpO2: 98 % (02/23/22 1146) Vital Signs (24h Range):  Temp:  [97.5 °F (36.4 °C)-98.5 °F (36.9 °C)] 98.3 °F (36.8 °C)  Pulse:  [60-96] 66  Resp:  [18-20] 19  SpO2:  [95 %-99 %] 98 %  BP: (171-244)/() 171/74     Weight: 104.8 kg (231 lb)  Body mass index is 40.92 kg/m².    Intake/Output Summary (Last 24 hours) at 2/23/2022 1403  Last data filed at 2/23/2022 0500  Gross per 24 hour   Intake --   Output 1600 ml   Net -1600 ml        Physical Exam  Constitutional:       General: She is not in acute distress.     Appearance: Normal appearance. She is not diaphoretic.   Eyes:      General: Lids are normal. No scleral icterus.        Right eye: No discharge.         Left eye: No discharge.      Conjunctiva/sclera: Conjunctivae normal.    Cardiovascular:      Rate and Rhythm: Normal rate.   Pulmonary:      Effort: Pulmonary effort is normal. No tachypnea, accessory muscle usage or respiratory distress.   Abdominal:      General: There is no distension.   Skin:     Coloration: Skin is not cyanotic.   Neurological:      Mental Status: She is alert. She is not disoriented.   Psychiatric:         Attention and Perception: Attention normal.         Mood and Affect: Affect normal.         Behavior: Behavior is cooperative.       Significant Labs:     Recent Labs   Lab 09/05/21  2139 12/21/21  0803 01/03/22  0000   HGBA1C 6.6* 5.3 5.4     :   Recent Labs   Lab 02/22/22  1908 02/23/22  0724 02/23/22  1145   POCTGLUCOSE 103 159* 196*       Recent Labs   Lab 02/20/22  0222 02/22/22  0323   WBC 6.78 6.22   HGB 8.0* 8.3*   HCT 25.9* 26.4*    290       Recent Labs   Lab 02/17/22  0333 02/18/22  0231 02/21/22  1626 02/22/22  0808 02/23/22  0342   *   < > 133* 134* 130*   K 4.6   < > 4.3 4.4 3.9      < > 102 104 100   CO2 21*   < > 23 21* 21*   BUN 34*   < > 31* 29* 28*   CREATININE 2.1*   < > 2.1* 1.9* 2.0*   *   < > 104 131* 169*   CALCIUM 9.1   < > 9.0 9.0 8.9   ALBUMIN 2.0*   < > 2.2* 2.1* 1.9*   MG 2.0  --   --  1.8  --    PHOS 4.5  --  4.3 4.5 4.2    < > = values in this interval not displayed.       Recent Labs   Lab 11/28/21  1950/21  0413 12/06/21  0758 01/03/22  0000 01/23/22  0332 02/09/22  0000 02/11/22  0849 02/11/22  1304   PROCAL  --   --  0.69*  --   --   --  0.12  --    LACTATE 0.8  --   --   --   --   --  1.8 1.4   FERRITIN  --    < >  --  353* 812* 470*  --   --     < > = values in this interval not displayed.       Results for orders placed or performed in visit on 02/09/22   Vitamin D   Result Value Ref Range    Vit D, 25-Hydroxy 58.8 30.0 - 100.0 ng/mL     SARS-CoV2 (COVID-19) Qualitative PCR (no units)   Date Value   02/15/2022 Not Detected   12/28/2021 Not Detected   12/21/2021 Not Detected   09/05/2021 Not  Detected     POC Rapid COVID (no units)   Date Value   02/11/2022 Negative   01/21/2022 Negative   01/20/2022 Negative   11/28/2021 Negative   09/28/2021 Negative   09/05/2021 Negative   05/14/2021 Negative       ECG Results              EKG 12-lead (Final result)  Result time 02/12/22 09:41:30      Final result by Interface, Lab In [facility] (02/12/22 09:41:30)                   Narrative:    Test Reason :     Vent. Rate : 057 BPM     Atrial Rate : 059 BPM     P-R Int : 184 ms          QRS Dur : 080 ms      QT Int : 426 ms       P-R-T Axes : -40 -13 -15 degrees     QTc Int : 415 ms    Sinus bradycardia  Voltage criteria for left ventricular hypertrophy  Lateral infarct (cited on or before 11-FEB-2022)  Abnormal ECG  When compared with ECG of 11-FEB-2022 08:39,  No significant change was found  Confirmed by KEVYN GARSIA MD ([id number]) on 2/12/2022 9:41:21 AM    Referred By: [email or web address]   SELF           Confirmed By:KEVYN GARSIA MD                                     EKG 12-lead (Final result)  Result time 02/12/22 09:39:20      Final result by Interface, Lab In [facility] (02/12/22 09:39:20)                   Narrative:    Test Reason : I95.9,    Vent. Rate : 066 BPM     Atrial Rate : 068 BPM     P-R Int : 000 ms          QRS Dur : 072 ms      QT Int : 402 ms       P-R-T Axes : 000 -15 -08 degrees     QTc Int : 422 ms    Technically poor ECG tracing  Sinus rhythm  Voltage criteria for left ventricular hypertrophy  Lateral infarct ,age undetermined  Abnormal ECG  When compared with ECG of 21-JAN-2022 12:55,  Questionable change in in R wave progression  Lateral infarct is now Present  T wave inversion now evident in Inferior leads  Confirmed by KEVYN GARSIA MD ([id number]) on 2/12/2022 9:39:06 AM    Referred By: [email or web address]   SELF           Confirmed By:KEVYN GARSIA MD                                    Results for orders placed during the hospital encounter of 11/28/21    Echo    Interpretation Summary  · The left ventricle  is normal in size with normal systolic function. The estimated ejection fraction is 60%.  · Normal right ventricular size with normal right ventricular systolic function.  · Normal left ventricular diastolic function.  · Mechanically ventilated; cannot use inferior caval vein diameter to estimate central venous pressure.      SURG FL Surgery Fluoro Usage  See OP Notes for results.     IMPRESSION: See OP Notes for results.     This procedure was auto-finalized by: Virtual Radiologist      Labs and Imaging within the last 24 hours listed above were reviewed.       Diet: Diet diabetic [facility]; 2000 Calorie; Renal; Fluid - 1000mL  Significant LDAs:   IV Access Type: Dialysis Access  Urinary Catheter Indication if present: Patient Does Not Have Urinary Catheter  Other Lines/Tubes/Drains: SPC    HIGH RISK CONDITION(S):   Patient has an abrupt change in neurologic status: Weakness     Goals of Care:    Previous admission:  1/21/22  Likely prognosis:  Fair  Code Status: Full Code  Comfort Only: No  Hospice: No  Goals at discharge: remain at home, with physician follow-up    Discharge Planning   FLORENTINO: 2/25/2022     Code Status: Full Code   Is the patient medically ready for discharge?: No    Reason for patient still in hospital (select all that apply): Patient trending condition and Pending disposition  Discharge Plan A: Home with family, Home Health

## 2022-02-23 NOTE — ASSESSMENT & PLAN NOTE
- h/o left ureteral stone and indwelling left ureteral stent  - stent placed 06/30/21- pt lost to fu- stent exchanged 12/23/21  - Urology following: plan for ureteroscopy w/laser lithotripsy vs. Stone basket extraction vs. Stent replacement 02/18/22  - Per Urology on 2/18:  Left ureteroscopy, Cystoscopy,  Left ureteral biopsy,  Left JJ ureteral stent exchange,  Left retrograde pyelogram.   - Stent with strings removed 2/21.  - Follow up with Urology

## 2022-02-23 NOTE — ASSESSMENT & PLAN NOTE
- HD MWF  - Nephrology following  - continue sevelamer carbonate 800mg TIDW  - improving  - IV hydration following Cystoscope 2/18.  - sCr and potassium elevated 2/19; trial of Lokelma and 1 L NS. Monitor closely  - sCr increasing but potassium improved with Lokelma.  - sCr stable; monitoring without HD or Lokelma  - sCr improving  - discontinue sevelamer

## 2022-02-24 LAB
ALBUMIN SERPL BCP-MCNC: 2.1 G/DL (ref 3.5–5.2)
ANION GAP SERPL CALC-SCNC: 9 MMOL/L (ref 8–16)
BUN SERPL-MCNC: 28 MG/DL (ref 8–23)
CALCIUM SERPL-MCNC: 8.8 MG/DL (ref 8.7–10.5)
CHLORIDE SERPL-SCNC: 103 MMOL/L (ref 95–110)
CO2 SERPL-SCNC: 21 MMOL/L (ref 23–29)
CREAT SERPL-MCNC: 1.9 MG/DL (ref 0.5–1.4)
ERYTHROCYTE [DISTWIDTH] IN BLOOD BY AUTOMATED COUNT: 13.9 % (ref 11.5–14.5)
EST. GFR  (AFRICAN AMERICAN): 30.6 ML/MIN/1.73 M^2
EST. GFR  (NON AFRICAN AMERICAN): 26.5 ML/MIN/1.73 M^2
FINAL PATHOLOGIC DIAGNOSIS: NORMAL
GLUCOSE SERPL-MCNC: 189 MG/DL (ref 70–110)
GROSS: NORMAL
HCT VFR BLD AUTO: 25.5 % (ref 37–48.5)
HGB BLD-MCNC: 8.4 G/DL (ref 12–16)
Lab: NORMAL
MCH RBC QN AUTO: 29.8 PG (ref 27–31)
MCHC RBC AUTO-ENTMCNC: 32.9 G/DL (ref 32–36)
MCV RBC AUTO: 90 FL (ref 82–98)
PHOSPHATE SERPL-MCNC: 4.5 MG/DL (ref 2.7–4.5)
PLATELET # BLD AUTO: 288 K/UL (ref 150–450)
PMV BLD AUTO: 9.6 FL (ref 9.2–12.9)
POCT GLUCOSE: 161 MG/DL (ref 70–110)
POCT GLUCOSE: 165 MG/DL (ref 70–110)
POCT GLUCOSE: 182 MG/DL (ref 70–110)
POCT GLUCOSE: 183 MG/DL (ref 70–110)
POTASSIUM SERPL-SCNC: 4.4 MMOL/L (ref 3.5–5.1)
RBC # BLD AUTO: 2.82 M/UL (ref 4–5.4)
SODIUM SERPL-SCNC: 133 MMOL/L (ref 136–145)
URATE SERPL-MCNC: 7.3 MG/DL (ref 2.4–5.7)
WBC # BLD AUTO: 6.63 K/UL (ref 3.9–12.7)

## 2022-02-24 PROCEDURE — 25000003 PHARM REV CODE 250: Mod: HCNC | Performed by: INTERNAL MEDICINE

## 2022-02-24 PROCEDURE — 11000001 HC ACUTE MED/SURG PRIVATE ROOM: Mod: HCNC

## 2022-02-24 PROCEDURE — 99232 SBSQ HOSP IP/OBS MODERATE 35: CPT | Mod: HCNC,95,, | Performed by: INTERNAL MEDICINE

## 2022-02-24 PROCEDURE — 25000003 PHARM REV CODE 250: Mod: HCNC | Performed by: STUDENT IN AN ORGANIZED HEALTH CARE EDUCATION/TRAINING PROGRAM

## 2022-02-24 PROCEDURE — 94761 N-INVAS EAR/PLS OXIMETRY MLT: CPT | Mod: HCNC

## 2022-02-24 PROCEDURE — 85027 COMPLETE CBC AUTOMATED: CPT | Mod: HCNC | Performed by: INTERNAL MEDICINE

## 2022-02-24 PROCEDURE — 25000003 PHARM REV CODE 250: Mod: HCNC | Performed by: PHYSICIAN ASSISTANT

## 2022-02-24 PROCEDURE — 99232 PR SUBSEQUENT HOSPITAL CARE,LEVL II: ICD-10-PCS | Mod: HCNC,95,, | Performed by: INTERNAL MEDICINE

## 2022-02-24 PROCEDURE — 36415 COLL VENOUS BLD VENIPUNCTURE: CPT | Mod: HCNC | Performed by: STUDENT IN AN ORGANIZED HEALTH CARE EDUCATION/TRAINING PROGRAM

## 2022-02-24 PROCEDURE — 36415 COLL VENOUS BLD VENIPUNCTURE: CPT | Mod: HCNC | Performed by: INTERNAL MEDICINE

## 2022-02-24 PROCEDURE — 84550 ASSAY OF BLOOD/URIC ACID: CPT | Mod: HCNC | Performed by: STUDENT IN AN ORGANIZED HEALTH CARE EDUCATION/TRAINING PROGRAM

## 2022-02-24 PROCEDURE — 80069 RENAL FUNCTION PANEL: CPT | Mod: HCNC | Performed by: INTERNAL MEDICINE

## 2022-02-24 RX ORDER — MUPIROCIN 20 MG/G
OINTMENT TOPICAL 2 TIMES DAILY
Status: DISCONTINUED | OUTPATIENT
Start: 2022-02-24 | End: 2022-02-25 | Stop reason: HOSPADM

## 2022-02-24 RX ORDER — NIFEDIPINE 60 MG/1
60 TABLET, EXTENDED RELEASE ORAL 2 TIMES DAILY
Status: DISCONTINUED | OUTPATIENT
Start: 2022-02-24 | End: 2022-02-25 | Stop reason: HOSPADM

## 2022-02-24 RX ADMIN — MUPIROCIN: 20 OINTMENT TOPICAL at 09:02

## 2022-02-24 RX ADMIN — ACETAMINOPHEN 650 MG: 325 TABLET ORAL at 07:02

## 2022-02-24 RX ADMIN — POLYETHYLENE GLYCOL 3350 17 G: 17 POWDER, FOR SOLUTION ORAL at 08:02

## 2022-02-24 RX ADMIN — NIFEDIPINE 60 MG: 60 TABLET, FILM COATED, EXTENDED RELEASE ORAL at 09:02

## 2022-02-24 RX ADMIN — INSULIN ASPART 4 UNITS: 100 INJECTION, SOLUTION INTRAVENOUS; SUBCUTANEOUS at 09:02

## 2022-02-24 RX ADMIN — CLONIDINE HYDROCHLORIDE 0.3 MG: 0.2 TABLET ORAL at 09:02

## 2022-02-24 RX ADMIN — LEVOTHYROXINE SODIUM 50 MCG: 50 TABLET ORAL at 06:02

## 2022-02-24 RX ADMIN — METOPROLOL TARTRATE 25 MG: 25 TABLET, FILM COATED ORAL at 09:02

## 2022-02-24 RX ADMIN — HYDROCODONE BITARTRATE AND ACETAMINOPHEN 1 TABLET: 10; 325 TABLET ORAL at 09:02

## 2022-02-24 RX ADMIN — INSULIN ASPART 4 UNITS: 100 INJECTION, SOLUTION INTRAVENOUS; SUBCUTANEOUS at 06:02

## 2022-02-24 RX ADMIN — APIXABAN 2.5 MG: 2.5 TABLET, FILM COATED ORAL at 09:02

## 2022-02-24 RX ADMIN — FAMOTIDINE 20 MG: 20 TABLET ORAL at 09:02

## 2022-02-24 RX ADMIN — DOCUSATE SODIUM 50 MG AND SENNOSIDES 8.6 MG 1 TABLET: 8.6; 5 TABLET, FILM COATED ORAL at 09:02

## 2022-02-24 RX ADMIN — SUMATRIPTAN SUCCINATE 100 MG: 50 TABLET ORAL at 07:02

## 2022-02-24 RX ADMIN — Medication 400 MG: at 09:02

## 2022-02-24 RX ADMIN — LISINOPRIL 40 MG: 20 TABLET ORAL at 09:02

## 2022-02-24 RX ADMIN — HYDROCODONE BITARTRATE AND ACETAMINOPHEN 1 TABLET: 10; 325 TABLET ORAL at 03:02

## 2022-02-24 RX ADMIN — TORSEMIDE 5 MG: 5 TABLET ORAL at 08:02

## 2022-02-24 RX ADMIN — OXYBUTYNIN CHLORIDE 10 MG: 10 TABLET, EXTENDED RELEASE ORAL at 09:02

## 2022-02-24 RX ADMIN — ACETAMINOPHEN 650 MG: 325 TABLET ORAL at 01:02

## 2022-02-24 RX ADMIN — INSULIN DETEMIR 13 UNITS: 100 INJECTION, SOLUTION SUBCUTANEOUS at 09:02

## 2022-02-24 RX ADMIN — METHOCARBAMOL 750 MG: 750 TABLET ORAL at 09:02

## 2022-02-24 RX ADMIN — SUMATRIPTAN SUCCINATE 100 MG: 50 TABLET ORAL at 05:02

## 2022-02-24 RX ADMIN — ATORVASTATIN CALCIUM 80 MG: 40 TABLET, FILM COATED ORAL at 08:02

## 2022-02-24 RX ADMIN — CLONIDINE HYDROCHLORIDE 0.3 MG: 0.2 TABLET ORAL at 03:02

## 2022-02-24 RX ADMIN — CLONIDINE HYDROCHLORIDE 0.1 MG: 0.1 TABLET ORAL at 09:02

## 2022-02-24 RX ADMIN — INSULIN ASPART 4 UNITS: 100 INJECTION, SOLUTION INTRAVENOUS; SUBCUTANEOUS at 01:02

## 2022-02-24 RX ADMIN — ANASTROZOLE 1 MG: 1 TABLET, COATED ORAL at 08:02

## 2022-02-24 RX ADMIN — VENLAFAXINE HYDROCHLORIDE 150 MG: 150 CAPSULE, EXTENDED RELEASE ORAL at 08:02

## 2022-02-24 RX ADMIN — CLONIDINE HYDROCHLORIDE 0.3 MG: 0.2 TABLET ORAL at 08:02

## 2022-02-24 NOTE — NURSING
Pt A&O, pt complaints of headache. Suprapubic catheter leaking, consult for urology. Will continue to monitor.

## 2022-02-24 NOTE — ASSESSMENT & PLAN NOTE
- h/o left ureteral stone and indwelling left ureteral stent  - stent placed 06/30/21- pt lost to follow up - stent exchanged 12/23/21  - Urology following: plan for ureteroscopy w/laser lithotripsy vs. Stone basket extraction vs. Stent replacement 02/18/22  - Per Urology on 2/18:  Left ureteroscopy, Cystoscopy,  Left ureteral biopsy,  Left JJ ureteral stent exchange,  Left retrograde pyelogram.   - Stent with strings removed 2/21.  - Follow up with Urology

## 2022-02-24 NOTE — ASSESSMENT & PLAN NOTE
- SIRS: 0/4; hypotension  - Hx of ESBL UTI; completed course of meropenem  - UA with 1+ leuks, 28 RBCs, >100 WBCs  - UCx w/GNR >100,000- susceptibilities to connie and bactrim- continue antibiotics leading up to ureteroscopy and dc post-op- stop connie - transition to Bactrim DS qday until 02/19  - Given Vanc in ED   - suprapubic catheter changed in the ED, and most recently changed in OR 2/18

## 2022-02-24 NOTE — PROGRESS NOTES
[facility] - Telemetry Stepdown ([place] [id number])  Adult Nutrition  Progress Note    SUMMARY       Recommendations    1. Continue Diabetic/Renal diet, fluids per MD     2. Please add Novasource Renal BID to increase intake     3. RD to monitor and follow    Goals: Meet % EEN, EPN by RD f/u date  Nutrition Goal Status: progressing towards goal    Communication of RD Recs: reviewed with RN    Assessment and Plan    Nutrition Problem  Altered nutrition related lab values      Related to (etiology):   ESRD     Signs and Symptoms (as evidenced by):   Elevated BUN/Creat, decreased GFR     Interventions(treatment strategy):  Collaboration of nutrition care w/ other providers  Phos, K, Na-modified diet     Nutrition Diagnosis Status:   Continues     Malnutrition Assessment       Orbital Region (Subcutaneous Fat Loss): mild depletion  Upper Arm Region (Subcutaneous Fat Loss): mild depletion   [place] Region (Muscle Loss): mild depletion  Clavicle Bone Region (Muscle Loss): well nourished  Clavicle and Acromion Bone Region (Muscle Loss): well nourished         Reason for Assessment    Reason For Assessment: RD follow-up  Diagnosis:  (Acute cystitis with hematuria)  Relevant Medical History: HTN, HLD, ESRD on HD  Interdisciplinary Rounds: did not attend    General Information Comments: Pt remains with fair appetite since last RD visit, tolerating ~50% PO intake with no c/o n/v/d/c or difficulties chewing/swallowing. Last HD 2/23. Plan to d/c home with HH. Food preferences documented in [employer]. Sacral wound noted. Unsure of UBW; 15# wt loss noted x 1.5 months. Possibly fluid related as pt is -29L since admit and has 2-3+ BLE edema documented; being diuresed. NFPE completed 2/18; pt with some mild age-appropriate wasting. No indicators of malnutrition at this time, however at risk with wt loss.    Nutrition Discharge Planning: Renal/diabetic diet    Nutrition Risk Screen    Nutrition Risk Screen: no indicators  "present    Nutrition/Diet History    Patient Reported Diet/Restrictions/Preferences: diabetic diet, renal  Spiritual, Cultural Beliefs, Protestant Practices, Values that Affect Care: no  Food Allergies: NKFA  Factors Affecting Nutritional Intake: NPO    Anthropometrics    Temp: 98 °F (36.7 °C)  Height Method: Stated  Height: 5' 3" (160 cm)  Height (inches): 63 in  Weight Method: Bed Scale  Weight: 104.8 kg (231 lb)  Weight (lb): 231 lb  Ideal Body Weight (IBW), Female: 115 lb  % Ideal Body Weight, Female (lb): 200.87 %  BMI (Calculated): 40.9  BMI Grade: greater than 40 - morbid obesity  Weight Loss: unintentional  Usual Body Weight (UBW), k.5 kg (Per chart review.)  % Usual Body Weight: 86.06  % Weight Change From Usual Weight: -14.12 %       Lab/Procedures/Meds    Pertinent Labs Reviewed: reviewed  Pertinent Labs Comments: Uric acid 7.3  Pertinent Medications Reviewed: reviewed  Pertinent Medications Comments: insulin, levothyroxine, senna, torsemide    Estimated/Assessed Needs    Weight Used For Calorie Calculations: 105.2 kg (231 lb 14.8 oz)  Energy Calorie Requirements (kcal): 1701 kcal/d  Energy Need Method: Marin-St Jeor (1.1 PAL)  Protein Requirements: 105-116 g/d (1-1.1 g/kg)  Weight Used For Protein Calculations: 105.2 kg (231 lb 14.8 oz)  Fluid Requirements (mL): 1 mL/kcal or per MD  Estimated Fluid Requirement Method: RDA Method  RDA Method (mL): 1701       Nutrition Prescription Ordered    Current Diet Order: Diabetic, Renal  Nutrition Order Comments: 1000 mL FR    Evaluation of Received Nutrient/Fluid Intake    I/O: -29L since admit  Energy Calories Required: not meeting needs  Protein Required: not meeting needs  Comments: LBM   Tolerance: tolerating  % Intake of Estimated Energy Needs: 50 - 75 %  % Meal Intake: 50 - 75 %    Nutrition Risk    Level of Risk/Frequency of Follow-up: low     Monitor and Evaluation    Food and Nutrient Intake: energy intake, food and beverage intake  Food and " Nutrient Adminstration: diet order  Knowledge/Beliefs/Attitudes: food and nutrition knowledge/skill  Physical Activity and Function: nutrition-related ADLs and IADLs  Anthropometric Measurements: weight, weight change  Biochemical Data, Medical Tests and Procedures: inflammatory profile, lipid profile, glucose/endocrine profile, electrolyte and renal panel  Nutrition-Focused Physical Findings: overall appearance     Nutrition Follow-Up    RD Follow-up?: Yes

## 2022-02-24 NOTE — ASSESSMENT & PLAN NOTE
- HD MWF  - Nephrology following  - continue sevelamer carbonate 800mg TIDW  - improving  - IV hydration following Cystoscope 2/18.  - sCr and potassium elevated 2/19; trial of Lokelma and 1 L NS. Monitor closely  - sCr increasing but potassium improved with Lokelma.  - sCr stable; monitoring without HD or Lokelma  - sCr improving  - discontinued sevelamer

## 2022-02-24 NOTE — SUBJECTIVE & OBJECTIVE
This encounter was provided through telemedicine.  Patient was transferred to the telemedicine service on:  02/17/2022   The patient location is: [id number] A admitted 2/11/2022  8:18 AM.  Present with the patient at the time of the telemed/virtual assessment: n/a    Interval History/Overnight Events:   Clinical record since admit reviewed.    Patient states urinating well once suprapubic changed - home health changes monthly at home; nursing noted some thick drainage when changed so Urology to evaluate    Markedly elevated BP this am with improvement after AM labs. Discussed home diet and importance of maintaining a low Na diet.    Review of Systems   Constitutional:  Positive for fatigue. Negative for fever.   Respiratory:  Negative for cough and shortness of breath.    Cardiovascular:  Negative for chest pain.   Gastrointestinal:  Negative for diarrhea and vomiting.   Neurological:  Negative for headaches.      Inpatient Medications:  Scheduled Meds:   anastrozole  1 mg Oral Daily    apixaban  2.5 mg Oral BID    atorvastatin  80 mg Oral Daily    cloNIDine  0.3 mg Oral TID    famotidine  20 mg Oral Daily    insulin aspart U-100  4 Units Subcutaneous TIDWM    insulin detemir U-100  13 Units Subcutaneous Daily    levothyroxine  50 mcg Oral Before breakfast    lisinopriL  40 mg Oral Daily    magnesium oxide  400 mg Oral Daily    metoprolol tartrate  25 mg Oral BID    mupirocin   Nasal BID    NIFEdipine  60 mg Oral BID    oxybutynin  10 mg Oral Daily    senna-docusate 8.6-50 mg  1 tablet Oral BID    torsemide  5 mg Oral Daily    venlafaxine  150 mg Oral Daily     Continuous Infusions:  PRN Meds:.acetaminophen, bisacodyL, cloNIDine, dextrose 10%, dextrose 10%, glucagon (human recombinant), glucose, glucose, HYDROcodone-acetaminophen, influenza, insulin aspart U-100, melatonin, methocarbamoL, naloxone, ondansetron, polyethylene glycol, promethazine, sodium chloride 0.9%, sodium chloride 0.9%, sumatriptan, traZODone       Objective:     Temp:  [97.7 °F (36.5 °C)-99.2 °F (37.3 °C)] 98.3 °F (36.8 °C)  Pulse:  [65-91] 69  Resp:  [18-20] 18  SpO2:  [94 %-97 %] 94 %  BP: (131-210)/() 158/70      Intake/Output Summary (Last 24 hours) at 2/24/2022 1536  Last data filed at 2/24/2022 1030  Gross per 24 hour   Intake --   Output 600 ml   Net -600 ml        Body mass index is 40.92 kg/m².    Physical Exam  Vitals and nursing note reviewed.   Constitutional:       General: She is not in acute distress.     Appearance: She is obese.   HENT:      Head: Normocephalic and atraumatic.   Eyes:      General: No scleral icterus.        Right eye: No discharge.         Left eye: No discharge.      Extraocular Movements: Extraocular movements intact.   Cardiovascular:      Rate and Rhythm: Normal rate.   Pulmonary:      Effort: Pulmonary effort is normal. No tachypnea or respiratory distress.   Neurological:      General: No focal deficit present.      Mental Status: She is alert and oriented to person, place, and time.      Cranial Nerves: No cranial nerve deficit.      Motor: No weakness.   Psychiatric:         Attention and Perception: Attention normal.         Mood and Affect: Mood and affect normal.         Speech: Speech normal.         Behavior: Behavior is cooperative.        Labs:  Recent Results (from the past 24 hour(s))   POCT glucose    Collection Time: 02/23/22  4:34 PM   Result Value Ref Range    POCT Glucose 164 (H) 70 - 110 mg/dL   Protein/Creatinine Ratio, Urine    Collection Time: 02/23/22  5:45 PM   Result Value Ref Range    Protein, Urine Random 193 (H) 0 - 15 mg/dL    Creatinine, Urine 18.0 15.0 - 325.0 mg/dL    Prot/Creat Ratio, Urine 10.72 (H) 0.00 - 0.20   Sodium, Random Urine    Collection Time: 02/23/22  5:45 PM   Result Value Ref Range    Sodium, Urine 58 20 - 250 mmol/L   Osmolality, Urine    Collection Time: 02/23/22  5:45 PM   Result Value Ref Range    Osmolality, Urine 189 50 - 1200 mOsm/kg   Creatinine, Random  Urine    Collection Time: 02/23/22  5:45 PM   Result Value Ref Range    Creatinine, Urine 18.0 15.0 - 325.0 mg/dL   Potassium, Random Urine    Collection Time: 02/23/22  5:45 PM   Result Value Ref Range    Potassium, Urine <10 (L) 15 - 95 mmol/L   Urea Nitrogen, Random Urine    Collection Time: 02/23/22  5:45 PM   Result Value Ref Range    Urine Urea Nitrogen 137 (L) 140 - 1050 mg/dL   Chloride, Random Urine    Collection Time: 02/23/22  5:45 PM   Result Value Ref Range    Chloride, Urine 52 25 - 200 mmol/L   Uric Acid, Random Urine    Collection Time: 02/23/22  5:45 PM   Result Value Ref Range    Uric Acid, Urine 13.0 <70.0 mg/dL   POCT glucose    Collection Time: 02/23/22  7:40 PM   Result Value Ref Range    POCT Glucose 290 (H) 70 - 110 mg/dL   CBC Without Differential    Collection Time: 02/24/22  3:07 AM   Result Value Ref Range    WBC 6.63 3.90 - 12.70 K/uL    RBC 2.82 (L) 4.00 - 5.40 M/uL    Hemoglobin 8.4 (L) 12.0 - 16.0 g/dL    Hematocrit 25.5 (L) 37.0 - 48.5 %    MCV 90 82 - 98 fL    MCH 29.8 27.0 - 31.0 pg    MCHC 32.9 32.0 - 36.0 g/dL    RDW 13.9 11.5 - 14.5 %    Platelets 288 150 - 450 K/uL    MPV 9.6 9.2 - 12.9 fL   Uric Acid    Collection Time: 02/24/22  3:07 AM   Result Value Ref Range    Uric Acid 7.3 (H) 2.4 - 5.7 mg/dL   POCT glucose    Collection Time: 02/24/22  7:37 AM   Result Value Ref Range    POCT Glucose 165 (H) 70 - 110 mg/dL   Renal function panel    Collection Time: 02/24/22 11:10 AM   Result Value Ref Range    Glucose 189 (H) 70 - 110 mg/dL    Sodium 133 (L) 136 - 145 mmol/L    Potassium 4.4 3.5 - 5.1 mmol/L    Chloride 103 95 - 110 mmol/L    CO2 21 (L) 23 - 29 mmol/L    BUN 28 (H) 8 - 23 mg/dL    Calcium 8.8 8.7 - 10.5 mg/dL    Creatinine 1.9 (H) 0.5 - 1.4 mg/dL    Albumin 2.1 (L) 3.5 - 5.2 g/dL    Phosphorus 4.5 2.7 - 4.5 mg/dL    eGFR if African American 30.6 (A) >60 mL/min/1.73 m^2    eGFR if non  26.5 (A) >60 mL/min/1.73 m^2    Anion Gap 9 8 - 16 mmol/L   POCT  glucose    Collection Time: 02/24/22 12:29 PM   Result Value Ref Range    POCT Glucose 183 (H) 70 - 110 mg/dL        Lab Results   Component Value Date    [id number] Not Detected 02/15/2022       Recent Labs   Lab 02/20/22 0222 02/22/22  0323 02/24/22  0307   WBC 6.78 6.22 6.63   HGB 8.0* 8.3* 8.4*   HCT 25.9* 26.4* 25.5*    290 288     Recent Labs   Lab 02/22/22  0808 02/23/22  0342 02/24/22  1110   * 130* 133*   K 4.4 3.9 4.4    100 103   CO2 21* 21* 21*   BUN 29* 28* 28*   CREATININE 1.9* 2.0* 1.9*   * 169* 189*   CALCIUM 9.0 8.9 8.8   MG 1.8  --   --    PHOS 4.5 4.2 4.5     Recent Labs   Lab 02/19/22  0316 02/20/22 0222 02/21/22  0349 02/21/22  1626 02/22/22  0808 02/23/22  0342 02/24/22  1110   ALKPHOS 108 107 105  --   --   --   --    ALT 7* 7* 7*  --   --   --   --    AST 11 9* 10  --   --   --   --    ALBUMIN 1.9* 1.9* 1.9*   < > 2.1* 1.9* 2.1*   PROT 5.3* 5.1* 5.2*  --   --   --   --    BILITOT 0.2 0.2 0.2  --   --   --   --     < > = values in this interval not displayed.        No results for input(s): DDIMER, FERRITIN, CRP, LDH, BNP, TROPONINI, CPK in the last 72 hours.    Invalid input(s): PROCALCITONIN    All labs within the last 24 hours were reviewed.     Microbiology:  Microbiology Results (last 7 days)       ** No results found for the last 168 hours. **              Imaging  ECG Results              EKG 12-lead (Final result)  Result time 02/12/22 09:41:30      Final result by Interface, Lab In [facility] (02/12/22 09:41:30)                   Narrative:    Test Reason :     Vent. Rate : 057 BPM     Atrial Rate : 059 BPM     P-R Int : 184 ms          QRS Dur : 080 ms      QT Int : 426 ms       P-R-T Axes : -40 -13 -15 degrees     QTc Int : 415 ms    Sinus bradycardia  Voltage criteria for left ventricular hypertrophy  Lateral infarct (cited on or before 11-FEB-2022)  Abnormal ECG  When compared with ECG of 11-FEB-2022 08:39,  No significant change was found  Confirmed by  KEVYN GARSIA MD ([id number]) on 2/12/2022 9:41:21 AM    Referred By: ELVA   SELF           Confirmed By:KEVYN GARSIA MD                                     EKG 12-lead (Final result)  Result time 02/12/22 09:39:20      Final result by Interface, Lab In [facility] (02/12/22 09:39:20)                   Narrative:    Test Reason : I95.9,    Vent. Rate : 066 BPM     Atrial Rate : 068 BPM     P-R Int : 000 ms          QRS Dur : 072 ms      QT Int : 402 ms       P-R-T Axes : 000 -15 -08 degrees     QTc Int : 422 ms    Technically poor ECG tracing  Sinus rhythm  Voltage criteria for left ventricular hypertrophy  Lateral infarct ,age undetermined  Abnormal ECG  When compared with ECG of 21-JAN-2022 12:55,  Questionable change in in R wave progression  Lateral infarct is now Present  T wave inversion now evident in Inferior leads  Confirmed by KEVYN GARSIA MD ([id number]) on 2/12/2022 9:39:06 AM    Referred By: ELVA   SELF           Confirmed By:KEVYN GARSIA MD                                    Results for orders placed during the hospital encounter of 11/28/21    Echo    Interpretation Summary  · The left ventricle is normal in size with normal systolic function. The estimated ejection fraction is 60%.  · Normal right ventricular size with normal right ventricular systolic function.  · Normal left ventricular diastolic function.  · Mechanically ventilated; cannot use inferior caval vein diameter to estimate central venous pressure.      SURG FL Surgery Fluoro Usage  See OP Notes for results.     IMPRESSION: See OP Notes for results.     This procedure was auto-finalized by: Virtual Radiologist      All imaging within the last 24 hours was reviewed.       Discharge Planning   FLORENTINO: 2/25/2022     Code Status: Full Code   Is the patient medically ready for discharge?: No    Reason for patient still in hospital (select all that apply): Patient trending condition, Treatment, and Pending disposition  Discharge Plan A: Home  with family, Home Health   Discharge Delays: None known at this time

## 2022-02-24 NOTE — PROGRESS NOTES
Petros Shaffer - Telemetry [facility] ([address] Medicine  Telemedicine Progress Note    Patient Name: Cecile Bowen  MRN: [id number]  Patient Class: IP- Inpatient   Admission Date: 2/11/2022  Length of Stay: 9 days  Attending Physician: Liana Ruiz MD  Primary Care Provider: Kailey Navarro MD      Subjective:     Principal Problem:Acute cystitis with hematuria    HPI:  Cecile Bowen is a 71 y.o. female with PMHx significant for HTN, HLD, hx of ESBL UTI, ESRD on HD admitted to observation for hypotension, found to have a UTI. Patient was at her dialysis clinic today when she was found to have a BP of 76/43 and advised to come to the ED for evaluation. Reports her SBPs usually run in the 120s. Endorses cloudy urine in her catheter bag for the past few days, and reports it was last changed about a week ago. Denies lightheadedness, dizziness, SOB, fatigue, weakness, HA, CP, cough, abdominal pain, n/v. Patient was recently hospitalized for a ESBL UTI for which she completed her course of meropenem. Of note, patient also has a right subclavian dialysis access that was also changed about a week ago.     In the ED, BP 94/24 improved to 102/57. Remaining VSS. WBC 8.06. Lactic 1.8. Procal 0.12. Hgb 8.5 (~BL). . Cr 2.3 (~BL). Glucose 224. UA with 1+ leuks, 28 RBCs, >100 WBCs. CXR with no acute processes. Given Vanc 2g x 1.       Overview/Hospital Course:  Admitted to  for possible UTI. Cultures obtained, suprapubic catheter changes apparently in the ED, and started Abx. Nephrology consulted for HD. Pt continued on broad antibiotics w/improvement in signs and symptoms. Final culture results w/klebsiella pneumonia- susceptible to connie and bactrim. Urology evaluated patient, 02/14. Pt w/h/o Lt ureteral stone- s/p stent placement 06/2021 and replacement 12/2021- intermittently lost to follow-up. Planning for ureteroscopy 02/18 w/definitive stone management and stent removal as patient has high  likelihood to continue to be lost to f/u. Agreed that it was in the best interest of the patient to remain inpatient to undergo further interventions/evaluations via urology. Pt was transitioned to bactrim 02/16- connie stopped.      Telemedicine  This service was provided by Virtual Visit.    Patient was transferred to [facility] on:  2/17/22     Chief Complaint   Patient presents with    Hypotension     Arrived via [facility] from dialysis center with c/o hypotension SBP 80s, sent for possible urosepsis, recent UTI diagnosis, did not receive dialysis today, last dialyzed wednesday       [facility]   Admitted 2/11/2022  8:18 AM  Present with the patient at the time of the telemed/virtual assessment: n/a    Interval History / Events Overnight:   The patient is able to provide adequate history. Additional history was obtained from past medical records. On Call Provider contacted about SBP > 200.    Patient complains of nothing specific. Symptoms have improved since yesterday. Associated symptoms include: fatigue. Symptoms are decreasing in severity.     Lab test(s) reviewed: H&H stable    Review of Systems   Constitutional:  Negative for fever.   Respiratory:  Negative for shortness of breath.      Objective:     Vital Signs (Most Recent):  Temp: 98.3 °F (36.8 °C) (02/23/22 1146)  Pulse: 66 (02/23/22 1146)  Resp: 19 (02/23/22 1146)  BP: (!) 171/74 (02/23/22 1146)  SpO2: 98 % (02/23/22 1146) Vital Signs (24h Range):  Temp:  [97.5 °F (36.4 °C)-98.5 °F (36.9 °C)] 98.3 °F (36.8 °C)  Pulse:  [60-96] 66  Resp:  [18-20] 19  SpO2:  [95 %-99 %] 98 %  BP: (171-244)/() 171/74     Weight: 104.8 kg (231 lb)  Body mass index is 40.92 kg/m².    Intake/Output Summary (Last 24 hours) at 2/23/2022 1403  Last data filed at 2/23/2022 0500  Gross per 24 hour   Intake --   Output 1600 ml   Net -1600 ml        Physical Exam  Constitutional:       General: She is not in acute distress.      Appearance: Normal appearance. She is not diaphoretic.   Eyes:      General: Lids are normal. No scleral icterus.        Right eye: No discharge.         Left eye: No discharge.      Conjunctiva/sclera: Conjunctivae normal.   Cardiovascular:      Rate and Rhythm: Normal rate.   Pulmonary:      Effort: Pulmonary effort is normal. No tachypnea, accessory muscle usage or respiratory distress.   Abdominal:      General: There is no distension.   Skin:     Coloration: Skin is not cyanotic.   Neurological:      Mental Status: She is alert. She is not disoriented.   Psychiatric:         Attention and Perception: Attention normal.         Mood and Affect: Affect normal.         Behavior: Behavior is cooperative.       Significant Labs:     Recent Labs   Lab 09/05/21  2139 12/21/21  0803 01/03/22  0000   HGBA1C 6.6* 5.3 5.4     :   Recent Labs   Lab 02/22/22  1908 02/23/22  0724 02/23/22  1145   POCTGLUCOSE 103 159* 196*       Recent Labs   Lab 02/20/22  0222 02/22/22  0323   WBC 6.78 6.22   HGB 8.0* 8.3*   HCT 25.9* 26.4*    290       Recent Labs   Lab 02/17/22  0333 02/18/22  0231 02/21/22  1626 02/22/22  0808 02/23/22  0342   *   < > 133* 134* 130*   K 4.6   < > 4.3 4.4 3.9      < > 102 104 100   CO2 21*   < > 23 21* 21*   BUN 34*   < > 31* 29* 28*   CREATININE 2.1*   < > 2.1* 1.9* 2.0*   *   < > 104 131* 169*   CALCIUM 9.1   < > 9.0 9.0 8.9   ALBUMIN 2.0*   < > 2.2* 2.1* 1.9*   MG 2.0  --   --  1.8  --    PHOS 4.5  --  4.3 4.5 4.2    < > = values in this interval not displayed.       Recent Labs   Lab 11/28/21  1952/21  0413 12/06/21  0758 01/03/22  0000 01/23/22  0332 02/09/22  0000 02/11/22  0849 02/11/22  1304   PROCAL  --   --  0.69*  --   --   --  0.12  --    LACTATE 0.8  --   --   --   --   --  1.8 1.4   FERRITIN  --    < >  --  353* 812* 470*  --   --     < > = values in this interval not displayed.       Results for orders placed or performed in visit on 02/09/22   Vitamin D    Result Value Ref Range    Vit D, 25-Hydroxy 58.8 30.0 - 100.0 ng/mL     SARS-CoV2 (COVID-19) Qualitative PCR (no units)   Date Value   02/15/2022 Not Detected   12/28/2021 Not Detected   12/21/2021 Not Detected   09/05/2021 Not Detected     POC Rapid COVID (no units)   Date Value   02/11/2022 Negative   01/21/2022 Negative   01/20/2022 Negative   11/28/2021 Negative   09/28/2021 Negative   09/05/2021 Negative   05/14/2021 Negative       ECG Results              EKG 12-lead (Final result)  Result time 02/12/22 09:41:30      Final result by Interface, Lab In [facility] (02/12/22 09:41:30)                   Narrative:    Test Reason :     Vent. Rate : 057 BPM     Atrial Rate : 059 BPM     P-R Int : 184 ms          QRS Dur : 080 ms      QT Int : 426 ms       P-R-T Axes : -40 -13 -15 degrees     QTc Int : 415 ms    Sinus bradycardia  Voltage criteria for left ventricular hypertrophy  Lateral infarct (cited on or before 11-FEB-2022)  Abnormal ECG  When compared with ECG of 11-FEB-2022 08:39,  No significant change was found  Confirmed by KEVYN GARSIA MD ([id number]) on 2/12/2022 9:41:21 AM    Referred By: [email or web address]   SELF           Confirmed By:KEVYN GARSIA MD                                     EKG 12-lead (Final result)  Result time 02/12/22 09:39:20      Final result by Interface, Lab In [facility] (02/12/22 09:39:20)                   Narrative:    Test Reason : I95.9,    Vent. Rate : 066 BPM     Atrial Rate : 068 BPM     P-R Int : 000 ms          QRS Dur : 072 ms      QT Int : 402 ms       P-R-T Axes : 000 -15 -08 degrees     QTc Int : 422 ms    Technically poor ECG tracing  Sinus rhythm  Voltage criteria for left ventricular hypertrophy  Lateral infarct ,age undetermined  Abnormal ECG  When compared with ECG of 21-JAN-2022 12:55,  Questionable change in in R wave progression  Lateral infarct is now Present  T wave inversion now evident in Inferior leads  Confirmed by KEVYN GARSIA MD ([id number]) on 2/12/2022 9:39:06  AM    Referred By: [email or web address]   SELF           Confirmed By:KEVYN GARSIA MD                                    Results for orders placed during the hospital encounter of 11/28/21    Echo    Interpretation Summary  · The left ventricle is normal in size with normal systolic function. The estimated ejection fraction is 60%.  · Normal right ventricular size with normal right ventricular systolic function.  · Normal left ventricular diastolic function.  · Mechanically ventilated; cannot use inferior caval vein diameter to estimate central venous pressure.      SURG FL Surgery Fluoro Usage  See OP Notes for results.     IMPRESSION: See OP Notes for results.     This procedure was auto-finalized by: Virtual Radiologist      Labs and Imaging within the last 24 hours listed above were reviewed.       Diet: Diet diabetic [facility]; 2000 Calorie; Renal; Fluid - 1000mL  Significant LDAs:   IV Access Type: Dialysis Access  Urinary Catheter Indication if present: Patient Does Not Have Urinary Catheter  Other Lines/Tubes/Drains: SPC    HIGH RISK CONDITION(S):   Patient has an abrupt change in neurologic status: Weakness     Goals of Care:    Previous admission:  1/21/22  Likely prognosis:  Fair  Code Status: Full Code  Comfort Only: No  Hospice: No  Goals at discharge: remain at home, with physician follow-up    Discharge Planning   FLORENTINO: 2/25/2022     Code Status: Full Code   Is the patient medically ready for discharge?: No    Reason for patient still in hospital (select all that apply): Patient trending condition and Pending disposition  Discharge Plan A: Home with family, Home Health          Assessment/Plan:      * Acute cystitis with hematuria  - SIRS: 0/4; hypotension  - Hx of ESBL UTI; completed course of meropenem  - UA with 1+ leuks, 28 RBCs, >100 WBCs  - UCx w/GNR >100,000- susceptibilities to connie and bactrim- continue antibiotics leading up to ureteroscopy and dc post-op- stop connie - transition to Bactrim DS  qday until 02/19  - Given Vanc in ED   - suprapubic catheter changed in the ED, and most recently changed in OR 2/18    CKD (chronic kidney disease) requiring chronic dialysis  - HD MWF  - Nephrology following  - continue sevelamer carbonate 800mg TIDW  - improving  - IV hydration following Cystoscope 2/18.  - sCr and potassium elevated 2/19; trial of Lokelma and 1 L NS. Monitor closely  - sCr increasing but potassium improved with Lokelma.  - sCr stable; monitoring without HD or Lokelma  - sCr improving  - discontinued sevelamer    Pressure injury of right buttock, stage 3  - present on admission  - wound care consulted, appreciate recs: Nursing to cleanse buttocks with cleansing cloths or sea cleanse wound cleanser, then apply a thin layer of Triad ointment to affected areas BID/PRN after cleaning. Leave open to air. No cover dressing needed.  - turn patient q2h    Urinary tract infection due to ESBL Klebsiella  - hx of    Secondary hypertension  - Continue lopressor, lisinopril and clonidine  - Discontinued Lasix   - Titrate BP meds accordingly  - BP elevated 2/23: avoid hydralazine due to resolving ZAK per Nephrology. Started Nifedipine; received 90 mg on 2/23. Clonidine increased to 0.3 mg. Torsemide started.    A-fib  - continue Eliquis 2.5mg BID  - continue Lopressor 25mg BID     Left ureteral stone  - h/o left ureteral stone and indwelling left ureteral stent  - stent placed 06/30/21- pt lost to follow up - stent exchanged 12/23/21  - Urology following: plan for ureteroscopy w/laser lithotripsy vs. Stone basket extraction vs. Stent replacement 02/18/22  - Per Urology on 2/18:  Left ureteroscopy, Cystoscopy,  Left ureteral biopsy,  Left JJ ureteral stent exchange,  Left retrograde pyelogram.   - Stent with strings removed 2/21.  - Follow up with Urology    Malignant neoplasm of left breast, estrogen receptor positive  - continue home anastrozole 1mg daily    Chronic pain  - follows w/pain specialist  -  Improved back pain   - continue Norco 10mg q6h for moderate pain    Suprapubic catheter  - exchanged Q month    Uncontrolled type 2 diabetes mellitus with stage 4 chronic kidney disease, with long-term current use of insulin  - uncontrolled on admit  - continued Levemir 13 units daily, Novolog 4 units TIDW, SSI  - up titrate as needed  - resume home insulin regimen at discharge    Mixed migraine and muscle contraction headache  - stable  - home Fioricet and sumatriptan prn    Neurogenic bladder  - noted, has suprapubic catheter   - SPC changed in OR 2/18    Urinary retention  - continue home oxybutynin 10mg daily    Recurrent urinary tract infection  - see above    Hyperlipidemia associated with type 2 diabetes mellitus  - continue Lipitor 80mg daily    Hypothyroidism  - continue home Synthroid 50mcg        Active Hospital Problems    Diagnosis  POA    *Acute cystitis with hematuria [N30.01]  Yes     L ureteroscopy, Cystoscopy, Left ureteral biopsy, Left JJ ureteral stent exchange, Left retrograde pyelogram, Suprapubic catheter exchanged  Antibiotics per ID    -Plan per primary team         CKD (chronic kidney disease) requiring chronic dialysis [N18.6, Z99.2]  Not Applicable    Pressure injury of right buttock, stage 3 [L89.313]  Yes    Urinary tract infection due to ESBL Klebsiella [N39.0, B96.89]  Yes    Secondary hypertension [I15.9]  Yes    A-fib [I48.91]  Yes     Chronic    Left ureteral stone [N20.1]  Yes    Malignant neoplasm of left breast, estrogen receptor positive [C50.912, Z17.0]  Not Applicable     Chronic    Chronic pain [G89.29]  Yes     Chronic    Suprapubic catheter [Z93.59]  Not Applicable     Chronic    Uncontrolled type 2 diabetes mellitus with stage 4 chronic kidney disease, with long-term current use of insulin [E11.22, E11.65, N18.4, Z79.4]  Not Applicable    Mixed migraine and muscle contraction headache [G43.909, G44.209]  Yes    Neurogenic bladder [N31.9]  Yes     Chronic     Urinary retention [R33.9]  Yes    Recurrent urinary tract infection [N39.0]  Yes    Hyperlipidemia associated with type 2 diabetes mellitus [E11.69, E78.5]  Yes     Chronic    Hypothyroidism [E03.9]  Yes     Chronic      Resolved Hospital Problems   No resolved problems to display.       Inpatient Medications Prescribed for Management of Current Problems:     Scheduled Meds:    anastrozole  1 mg Oral Daily    apixaban  2.5 mg Oral BID    atorvastatin  80 mg Oral Daily    cloNIDine  0.3 mg Oral TID    famotidine  20 mg Oral Daily    insulin aspart U-100  4 Units Subcutaneous TIDWM    insulin detemir U-100  13 Units Subcutaneous Daily    levothyroxine  50 mcg Oral Before breakfast    lisinopriL  40 mg Oral Daily    magnesium oxide  400 mg Oral Daily    metoprolol tartrate  25 mg Oral BID    [START ON 2/24/2022] NIFEdipine  60 mg Oral Daily    oxybutynin  10 mg Oral Daily    senna-docusate 8.6-50 mg  1 tablet Oral BID    torsemide  5 mg Oral Daily    venlafaxine  150 mg Oral Daily     Continuous Infusions:   As Needed: acetaminophen, bisacodyL, cloNIDine, dextrose 10%, dextrose 10%, glucagon (human recombinant), glucose, glucose, HYDROcodone-acetaminophen, influenza, insulin aspart U-100, melatonin, methocarbamoL, naloxone, ondansetron, polyethylene glycol, promethazine, sodium chloride 0.9%, sodium chloride 0.9%, sumatriptan, traZODone    VTE Risk Mitigation (From admission, onward)         Ordered     heparin (porcine) injection 1,000 Units  As needed (PRN)         02/12/22 1104     apixaban tablet 2.5 mg  2 times daily         02/11/22 1342     IP VTE HIGH RISK PATIENT  Once         02/11/22 1132     Place sequential compression device  Until discontinued         02/11/22 1132              I have assessed these finding virtually using telemed platform and with assistance of bedside nurse     The attending portion of this evaluation, treatment, and documentation was performed per Liana Ruiz,  MD via Telemedicine AudioVisual using the secure PR Slides software platform with 2 way audio/video. The provider was located off-site and the patient is located in the hospital. The aforementioned video software was utilized to document the relevant history and physical exam.    Liana Ruiz MD  Department of Hospital Medicine   [facility] - Telemetry Stepdown ([place]-[facility])

## 2022-02-24 NOTE — ASSESSMENT & PLAN NOTE
- Continue lopressor, lisinopril and clonidine  - Discontinued Lasix   - Titrate BP meds accordingly  - BP elevated 2/23: avoid hydralazine due to resolving ZAK per Nephrology. Started Nifedipine; received 90 mg on 2/23. Clonidine increased to 0.3 mg. Torsemide started.

## 2022-02-25 ENCOUNTER — DOCUMENT SCAN (OUTPATIENT)
Dept: HOME HEALTH SERVICES | Facility: HOSPITAL | Age: 72
End: 2022-02-25
Payer: MEDICARE

## 2022-02-25 VITALS
DIASTOLIC BLOOD PRESSURE: 88 MMHG | TEMPERATURE: 98 F | WEIGHT: 231 LBS | RESPIRATION RATE: 20 BRPM | OXYGEN SATURATION: 95 % | SYSTOLIC BLOOD PRESSURE: 186 MMHG | BODY MASS INDEX: 40.93 KG/M2 | HEIGHT: 63 IN | HEART RATE: 67 BPM

## 2022-02-25 LAB
POCT GLUCOSE: 148 MG/DL (ref 70–110)
POCT GLUCOSE: 166 MG/DL (ref 70–110)
POCT GLUCOSE: 174 MG/DL (ref 70–110)

## 2022-02-25 PROCEDURE — 99239 HOSP IP/OBS DSCHRG MGMT >30: CPT | Mod: HCNC,95,, | Performed by: INTERNAL MEDICINE

## 2022-02-25 PROCEDURE — 25000003 PHARM REV CODE 250: Mod: HCNC | Performed by: INTERNAL MEDICINE

## 2022-02-25 PROCEDURE — 25000003 PHARM REV CODE 250: Mod: HCNC | Performed by: STUDENT IN AN ORGANIZED HEALTH CARE EDUCATION/TRAINING PROGRAM

## 2022-02-25 PROCEDURE — 99239 PR HOSPITAL DISCHARGE DAY,>30 MIN: ICD-10-PCS | Mod: HCNC,95,, | Performed by: INTERNAL MEDICINE

## 2022-02-25 PROCEDURE — 1111F PR DISCHARGE MEDS RECONCILED W/ CURRENT OUTPATIENT MED LIST: ICD-10-PCS | Mod: HCNC,95,CPTII, | Performed by: INTERNAL MEDICINE

## 2022-02-25 PROCEDURE — 94760 N-INVAS EAR/PLS OXIMETRY 1: CPT | Mod: HCNC

## 2022-02-25 PROCEDURE — 1111F DSCHRG MED/CURRENT MED MERGE: CPT | Mod: HCNC,95,CPTII, | Performed by: INTERNAL MEDICINE

## 2022-02-25 PROCEDURE — 25000003 PHARM REV CODE 250: Mod: HCNC | Performed by: PHYSICIAN ASSISTANT

## 2022-02-25 RX ORDER — TORSEMIDE 5 MG/1
5 TABLET ORAL DAILY
Qty: 30 TABLET | Refills: 11 | Status: SHIPPED | OUTPATIENT
Start: 2022-02-26 | End: 2022-07-21

## 2022-02-25 RX ORDER — NIFEDIPINE 60 MG/1
60 TABLET, EXTENDED RELEASE ORAL 2 TIMES DAILY
Qty: 60 TABLET | Refills: 11 | Status: SHIPPED | OUTPATIENT
Start: 2022-02-25 | End: 2022-03-14

## 2022-02-25 RX ORDER — CLONIDINE HYDROCHLORIDE 0.2 MG/1
0.2 TABLET ORAL 3 TIMES DAILY
Status: DISCONTINUED | OUTPATIENT
Start: 2022-02-25 | End: 2022-02-25 | Stop reason: HOSPADM

## 2022-02-25 RX ORDER — LISINOPRIL 40 MG/1
40 TABLET ORAL DAILY
Qty: 90 TABLET | Refills: 3 | Status: SHIPPED | OUTPATIENT
Start: 2022-02-25 | End: 2022-03-14

## 2022-02-25 RX ORDER — HYDROCODONE BITARTRATE AND ACETAMINOPHEN 10; 325 MG/1; MG/1
1 TABLET ORAL EVERY 8 HOURS PRN
Qty: 20 TABLET | Refills: 0 | Status: SHIPPED | OUTPATIENT
Start: 2022-02-25 | End: 2022-03-07 | Stop reason: SDUPTHER

## 2022-02-25 RX ADMIN — INSULIN ASPART 4 UNITS: 100 INJECTION, SOLUTION INTRAVENOUS; SUBCUTANEOUS at 12:02

## 2022-02-25 RX ADMIN — ANASTROZOLE 1 MG: 1 TABLET, COATED ORAL at 09:02

## 2022-02-25 RX ADMIN — CLONIDINE HYDROCHLORIDE 0.3 MG: 0.2 TABLET ORAL at 09:02

## 2022-02-25 RX ADMIN — SUMATRIPTAN SUCCINATE 100 MG: 50 TABLET ORAL at 09:02

## 2022-02-25 RX ADMIN — CLONIDINE HYDROCHLORIDE 0.1 MG: 0.1 TABLET ORAL at 03:02

## 2022-02-25 RX ADMIN — TRAZODONE HYDROCHLORIDE 100 MG: 100 TABLET ORAL at 12:02

## 2022-02-25 RX ADMIN — INSULIN DETEMIR 13 UNITS: 100 INJECTION, SOLUTION SUBCUTANEOUS at 09:02

## 2022-02-25 RX ADMIN — NIFEDIPINE 60 MG: 60 TABLET, FILM COATED, EXTENDED RELEASE ORAL at 09:02

## 2022-02-25 RX ADMIN — INSULIN ASPART 4 UNITS: 100 INJECTION, SOLUTION INTRAVENOUS; SUBCUTANEOUS at 09:02

## 2022-02-25 RX ADMIN — ATORVASTATIN CALCIUM 80 MG: 40 TABLET, FILM COATED ORAL at 09:02

## 2022-02-25 RX ADMIN — HYDROCODONE BITARTRATE AND ACETAMINOPHEN 1 TABLET: 10; 325 TABLET ORAL at 09:02

## 2022-02-25 RX ADMIN — DOCUSATE SODIUM 50 MG AND SENNOSIDES 8.6 MG 1 TABLET: 8.6; 5 TABLET, FILM COATED ORAL at 09:02

## 2022-02-25 RX ADMIN — LEVOTHYROXINE SODIUM 50 MCG: 50 TABLET ORAL at 06:02

## 2022-02-25 RX ADMIN — LISINOPRIL 40 MG: 20 TABLET ORAL at 09:02

## 2022-02-25 RX ADMIN — CLONIDINE HYDROCHLORIDE 0.2 MG: 0.2 TABLET ORAL at 03:02

## 2022-02-25 RX ADMIN — OXYBUTYNIN CHLORIDE 10 MG: 10 TABLET, EXTENDED RELEASE ORAL at 09:02

## 2022-02-25 RX ADMIN — MUPIROCIN: 20 OINTMENT TOPICAL at 09:02

## 2022-02-25 RX ADMIN — INSULIN ASPART 4 UNITS: 100 INJECTION, SOLUTION INTRAVENOUS; SUBCUTANEOUS at 05:02

## 2022-02-25 RX ADMIN — HYDROCODONE BITARTRATE AND ACETAMINOPHEN 1 TABLET: 10; 325 TABLET ORAL at 05:02

## 2022-02-25 RX ADMIN — FAMOTIDINE 20 MG: 20 TABLET ORAL at 09:02

## 2022-02-25 RX ADMIN — POLYETHYLENE GLYCOL 3350 17 G: 17 POWDER, FOR SOLUTION ORAL at 09:02

## 2022-02-25 RX ADMIN — METHOCARBAMOL 750 MG: 750 TABLET ORAL at 12:02

## 2022-02-25 RX ADMIN — TORSEMIDE 5 MG: 5 TABLET ORAL at 09:02

## 2022-02-25 RX ADMIN — METOPROLOL TARTRATE 25 MG: 25 TABLET, FILM COATED ORAL at 09:02

## 2022-02-25 RX ADMIN — APIXABAN 2.5 MG: 2.5 TABLET, FILM COATED ORAL at 09:02

## 2022-02-25 RX ADMIN — Medication 400 MG: at 09:02

## 2022-02-25 RX ADMIN — VENLAFAXINE HYDROCHLORIDE 150 MG: 150 CAPSULE, EXTENDED RELEASE ORAL at 09:02

## 2022-02-25 NOTE — PLAN OF CARE
[facility] - Telemetry Stepdown ([place] [address])      HOME HEALTH ORDERS  FACE TO FACE ENCOUNTER    Patient Name: Cecile Bowen  YOB: 1950    PCP: Kailey Navarro MD   PCP Address: [address] [facility] / [place] [address]  PCP Phone Number: [phone]  PCP Fax: [phone]    Encounter Date: 2/11/22    Admit to Home Health    Diagnoses:  Active Hospital Problems    Diagnosis  POA    *Acute cystitis with hematuria [N30.01]  Yes     L ureteroscopy, Cystoscopy, Left ureteral biopsy, Left JJ ureteral stent exchange, Left retrograde pyelogram, Suprapubic catheter exchanged  Antibiotics per ID    -Plan per primary team         CKD (chronic kidney disease) requiring chronic dialysis [N18.6, Z99.2]  Not Applicable    Pressure injury of right buttock, stage 3 [L89.313]  Yes    Urinary tract infection due to ESBL Klebsiella [N39.0, B96.89]  Yes    Secondary hypertension [I15.9]  Yes    A-fib [I48.91]  Yes     Chronic    Left ureteral stone [N20.1]  Yes    Malignant neoplasm of left breast, estrogen receptor positive [C50.912, Z17.0]  Not Applicable     Chronic    CKD stage 4 due to type 2 diabetes mellitus [E11.22, N18.4]  Yes     Chronic     [facility]      Chronic pain [G89.29]  Yes     Chronic    Suprapubic catheter [Z93.59]  Not Applicable     Chronic    Uncontrolled type 2 diabetes mellitus with stage 4 chronic kidney disease, with long-term current use of insulin [E11.22, E11.65, N18.4, Z79.4]  Not Applicable    Mixed migraine and muscle contraction headache [G43.909, G44.209]  Yes    Neurogenic bladder [N31.9]  Yes     Chronic    Urinary retention [R33.9]  Yes    Recurrent urinary tract infection [N39.0]  Yes    Hyperlipidemia associated with type 2 diabetes mellitus [E11.69, E78.5]  Yes     Chronic    Hypothyroidism [E03.9]  Yes     Chronic      Resolved Hospital Problems   No resolved problems to display.       Follow Up Appointments:  Future Appointments   Date  Time Provider Department Center   3/2/2022 10:00 AM Sánchez [place] MD [facility] [place] [facility] [place]       Allergies:Review of patient's allergies indicates:  No Known Allergies    Medications: Review discharge medications with patient and family and provide education.    Current Facility-Administered Medications   Medication Dose Route Frequency Provider Last Rate Last Admin    acetaminophen tablet 650 mg  650 mg Oral Q8H PRN Anders Crane MD   650 mg at 02/24/22 1919    anastrozole tablet 1 mg  1 mg Oral Daily Mally Joyner PA-C   1 mg at 02/25/22 0912    apixaban tablet 2.5 mg  2.5 mg Oral BID Anders Crane MD   2.5 mg at 02/25/22 0912    atorvastatin tablet 80 mg  80 mg Oral Daily Mally Joyner PA-C   80 mg at 02/25/22 0911    bisacodyL suppository 10 mg  10 mg Rectal Daily PRN Mally Joyner PA-C        cloNIDine tablet 0.1 mg  0.1 mg Oral Q8H PRN Liana Ruiz MD   0.1 mg at 02/24/22 0930    cloNIDine tablet 0.2 mg  0.2 mg Oral TID Dorothy Kraus MD        dextrose 10% bolus 125 mL  12.5 g Intravenous PRN Anders Crane MD        dextrose 10% bolus 250 mL  25 g Intravenous PRN Anders Crane MD        famotidine tablet 20 mg  20 mg Oral Daily Mally Joyner PA-C   20 mg at 02/25/22 0912    glucagon (human recombinant) injection 1 mg  1 mg Intramuscular PRN Mally Joyner PA-C        glucose chewable tablet 16 g  16 g Oral PRN Mally Joyner PA-C        glucose chewable tablet 24 g  24 g Oral PRN Mally Joyner PA-C        HYDROcodone-acetaminophen  mg per tablet 1 tablet  1 tablet Oral Q6H PRN Anders Crane MD   1 tablet at 02/25/22 0912    influenza (QUADRIVALENT ADJUVANTED PF) vaccine 0.5 mL  0.5 mL Intramuscular vaccine x 1 dose Anders Crane MD        insulin aspart U-100 pen 0-5 Units  0-5 Units Subcutaneous QID (AC + HS) PRN Anders Crane MD   1 Units at 02/23/22 2146    insulin aspart U-100 pen 4 Units  4 Units Subcutaneous TIDWM Anders Crane MD   4 Units at 02/25/22 1215    insulin  detemir U-100 pen 13 Units  13 Units Subcutaneous Daily Anders Crane MD   13 Units at 02/25/22 0914    levothyroxine tablet 50 mcg  50 mcg Oral Before breakfast Mally Joyner PA-C   50 mcg at 02/25/22 0620    lisinopriL tablet 40 mg  40 mg Oral Daily Anders Crane MD   40 mg at 02/25/22 0911    magnesium oxide tablet 400 mg  400 mg Oral Daily Anders Crane MD   400 mg at 02/25/22 0910    melatonin tablet 6 mg  6 mg Oral Nightly PRN Mally Joyner PA-C        methocarbamoL tablet 750 mg  750 mg Oral TID PRN Mally Joyner PA-C   750 mg at 02/25/22 1221    metoprolol tartrate (LOPRESSOR) tablet 25 mg  25 mg Oral BID Anders Crane MD   25 mg at 02/25/22 0912    mupirocin 2 % ointment   Nasal BID Dorothy Kraus MD   Given at 02/25/22 0914    naloxone 0.4 mg/mL injection 0.02 mg  0.02 mg Intravenous PRN Mally Joyner PA-C        NIFEdipine 24 hr tablet 60 mg  60 mg Oral BID Dorothy Kraus MD   60 mg at 02/25/22 0912    ondansetron disintegrating tablet 8 mg  8 mg Oral Q8H PRN Mally Joyner PA-C        oxybutynin 24 hr tablet 10 mg  10 mg Oral Daily Mally Joynre PA-C   10 mg at 02/25/22 0912    polyethylene glycol packet 17 g  17 g Oral Daily PRN Mally Joyner PA-C   17 g at 02/25/22 0911    promethazine tablet 25 mg  25 mg Oral Q6H PRN Mally Joyner PA-C        senna-docusate 8.6-50 mg per tablet 1 tablet  1 tablet Oral BID Liana Ruiz MD   1 tablet at 02/25/22 0911    sodium chloride 0.9% flush 10 mL  10 mL Intravenous Q8H PRN Mally Joyner PA-C   10 mL at 02/17/22 0841    sodium chloride 0.9% flush 3 mL  3 mL Intravenous PRN Alex Alba Jr., MD        sumatriptan tablet 100 mg  100 mg Oral BID PRN Mally Joyner PA-C   100 mg at 02/25/22 0928    torsemide tablet 5 mg  5 mg Oral Daily Rosaline Feliz MD   5 mg at 02/25/22 0917    traZODone tablet 100 mg  100 mg Oral Nightly PRN Mally oJyner PA-C   100 mg at 02/25/22 0042    venlafaxine 24 hr capsule 150 mg  150 mg  Oral Daily Mally Joyner PA-C   150 mg at 02/25/22 0911     Current Discharge Medication List      START taking these medications    Details   HYDROcodone-acetaminophen (NORCO)  mg per tablet Take 1 tablet by mouth every 8 (eight) hours as needed for Pain.  Qty: 20 tablet, Refills: 0    Comments: Quantity prescribed more than 7 day supply? No      NIFEdipine (PROCARDIA-XL) 60 MG (OSM) 24 hr tablet Take 1 tablet (60 mg total) by mouth 2 (two) times a day. HOLD MEDICATION IF SYSTOLIC BLOOD PRESSURE (TOP NUMBER) IS LESS THAN 130.  Qty: 60 tablet, Refills: 11    Comments: .      torsemide (DEMADEX) 5 MG Tab Take 1 tablet (5 mg total) by mouth once daily.  Qty: 30 tablet, Refills: 11         CONTINUE these medications which have CHANGED    Details   lisinopriL (PRINIVIL,ZESTRIL) 40 MG tablet Take 1 tablet (40 mg total) by mouth once daily.  Qty: 90 tablet, Refills: 3    Comments: .      magnesium oxide (MAG-OX) 400 mg (241.3 mg magnesium) tablet Take 1 tablet (400 mg total) by mouth once daily.  Qty: 180 tablet, Refills: 3         CONTINUE these medications which have NOT CHANGED    Details   anastrozole (ARIMIDEX) 1 mg Tab TAKE 1 TABLET BY MOUTH EVERY DAY  Qty: 90 tablet, Refills: 2    Associated Diagnoses: Malignant neoplasm of upper-outer quadrant of left breast in female, estrogen receptor positive      apixaban (ELIQUIS) 2.5 mg Tab Take 2.5 mg by mouth 2 (two) times daily.      atorvastatin (LIPITOR) 80 MG tablet Take 1 tablet (80 mg total) by mouth once daily.  Qty: 90 tablet, Refills: 3      butalbital-acetaminophen-caffeine -40 mg (FIORICET, ESGIC) -40 mg per tablet TAKE 1 TABLET BY MOUTH WHEN NOT CONTROLLED BY OTHER MEDS. NO MORE THAN 10 TABS PER 30 DAYS  Qty: 10 tablet, Refills: 0      cloNIDine (CATAPRES) 0.2 MG tablet Take 1 tablet (0.2 mg total) by mouth 3 (three) times daily.  Qty: 90 tablet, Refills: 11      diclofenac sodium (ARTHRITIS PAIN, DICLOFENAC,) 1 % Gel Apply to chest wall as  needed for arthritis      ergocalciferol (ERGOCALCIFEROL) 50,000 unit Cap TAKE 1 CAPSULE (50,000 UNITS TOTAL) BY MOUTH EVERY 7 DAYS. TAKE ONE CAPSULE BY MOUTH ONE TIME PER WEEK, TAKES ON TUESDAYS  Qty: 12 capsule, Refills: 3      famotidine (PEPCID) 20 MG tablet Take 1 tablet (20 mg total) by mouth once daily.  Qty: 30 tablet, Refills: 11      gemfibroziL (LOPID) 600 MG tablet Take 1 tablet (600 mg total) by mouth every Mon, Wed, Fri.  Qty: 36 tablet, Refills: 3      insulin (LANTUS SOLOSTAR U-100 INSULIN) glargine 100 units/mL (3mL) SubQ pen Inject 12 Units into the skin once daily.      insulin lispro (HUMALOG U-100 INSULIN) 100 unit/mL injection Inject 12 Units into the skin 3 (three) times daily with meals.      methocarbamoL (ROBAXIN) 750 MG Tab TAKE 1-2 TABLETS (750-1,500 MG TOTAL) BY MOUTH 3 (THREE) TIMES DAILY AS NEEDED.  Qty: 180 tablet, Refills: 1    Associated Diagnoses: Chronic midline low back pain without sciatica; Chronic neck pain; Fibromyalgia      metoprolol tartrate (LOPRESSOR) 25 MG tablet Take 1 tablet (25 mg total) by mouth 2 (two) times daily.  Qty: 60 tablet, Refills: 11    Comments: .      scopolamine (TRANSDERM-SCOP TD) Place 1 patch onto the skin as needed (When cruising).      sumatriptan (IMITREX) 100 MG tablet TAKE 1 TABLET (100 MG TOTAL) BY MOUTH 2 (TWO) TIMES DAILY AS NEEDED FOR MIGRAINE.  Qty: 9 tablet, Refills: 11    Associated Diagnoses: Intractable chronic migraine without aura and with status migrainosus      traZODone (DESYREL) 100 MG tablet Take 1 tablet (100 mg total) by mouth nightly as needed for Insomnia.  Qty: 90 tablet, Refills: 0      venlafaxine (EFFEXOR-XR) 150 MG Cp24 Take 1 capsule (150 mg total) by mouth once daily.  Qty: 30 capsule, Refills: 11      albuterol (PROVENTIL HFA) 90 mcg/actuation inhaler Inhale 2 puffs into the lungs every 6 (six) hours as needed for Wheezing or Shortness of Breath. Rescue  Qty: 8.5 g, Refills: 0      BD INSULIN SYRINGE ULTRA-FINE 0.5  "mL 31 gauge x 5/16" Syrg USE WITH INSULIN 4 TIMES A DAY  Qty: 100 each, Refills: 12      !! blood sugar diagnostic Strp ONE TOUCH ULTRA Check blood sugars 1-2 x a day  Qty: 50 strip, Refills: 6    Comments: ONE TOUCH ULTRA Check blood sugars 1-2 x a day  Associated Diagnoses: Uncontrolled type 2 diabetes mellitus with chronic kidney disease, with long-term current use of insulin      !! blood sugar diagnostic Strp To check BG 4  times daily, to use with insurance preferred meter  Qty: 400 each, Refills: 3      blood-glucose meter kit To check BG 4 times daily, to use with insurance preferred meter  Qty: 1 each, Refills: 0      !! lancets Misc One touch ultra, checks 1-2 x a day  Qty: 50 each, Refills: 6      !! lancets Misc To check BG 4 times daily, to use with insurance preferred meter  Qty: 400 each, Refills: 3      levothyroxine (SYNTHROID) 50 MCG tablet Take 1 tablet (50 mcg total) by mouth before breakfast. Administer on an empty stomach at least 30 minutes before food. If receiving tube feeds, HOLD tube feeds for 1 hour before and after levothyroxine administration.  Qty: 90 tablet, Refills: 2      oxybutynin (DITROPAN-XL) 10 MG 24 hr tablet TAKE 1 TABLET BY MOUTH EVERY DAY  Qty: 90 tablet, Refills: 1    Associated Diagnoses: Bladder spasms      pen needle, diabetic (BD ULTRA-FINE SHORT PEN NEEDLE) 31 gauge x 5/16" Ndle USE WITH LANTUS DAILY, e 11.65  Qty: 100 each, Refills: 3    Comments: DX Code Needed  PATIENT IS REQUESTING A REFILL FOR THIS RX.  Associated Diagnoses: Type 2 diabetes mellitus not at goal      polyethylene glycol (GLYCOLAX) 17 gram PwPk One packet QD prn constipation  Qty: 30 packet, Refills: 6       !! - Potential duplicate medications found. Please discuss with provider.      STOP taking these medications       furosemide (LASIX) 80 MG tablet Comments:   Reason for Stopping:         oxyCODONE-acetaminophen (PERCOCET)  mg per tablet Comments:   Reason for Stopping:         SODIUM " BICARBONATE ORAL Comments:   Reason for Stopping:         sevelamer carbonate (RENVELA) 800 mg Tab Comments:   Reason for Stopping:                 I have seen and examined this patient within the last 30 days. My clinical findings that support the need for the home health skilled services and home bound status are the following:no   Weakness/numbness causing balance and gait disturbance due to Weakness/Debility making it taxing to leave home.  Requiring assistive device to leave home due to unsteady gait caused by  Weakness/Debility.     Diet:   diabetic diet 2200 calorie and renal diet    Labs:  SN to perform labs:  CMP weekly on Mondays for 3 weeks   and Report Lab results to PCP.    Referrals/ Consults  Physical Therapy to evaluate and treat. Evaluate for home safety and equipment needs; Establish/upgrade home exercise program. Perform / instruct on therapeutic exercises, gait training, transfer training, and Range of Motion.  Occupational Therapy to evaluate and treat. Evaluate home environment for safety and equipment needs. Perform/Instruct on transfers, ADL training, ROM, and therapeutic exercises.  Aide to provide assistance with personal care, ADLs, and vital signs.    Activities:   activity as tolerated    Nursing:   Agency to admit patient within 24 hours of hospital discharge unless specified on physician order or at patient request    SN to complete comprehensive assessment including routine vital signs. Instruct on disease process and s/s of complications to report to MD. Review/verify medication list sent home with the patient at time of discharge  and instruct patient/caregiver as needed. Frequency may be adjusted depending on start of care date.     Skilled nurse to perform up to 3 visits PRN for symptoms related to diagnosis    Notify MD if SBP > 160 or < 90; DBP > 90 or < 50; HR > 120 or < 50; Temp > 101; O2 < 88%  Ok to schedule additional visits based on staff availability and patient request  on consecutive days within the home health episode.    When multiple disciplines ordered:    Start of Care occurs on Sunday - Wednesday schedule remaining discipline evaluations as ordered on separate consecutive days following the start of care.    Thursday SOC -schedule subsequent evaluations Friday and Monday the following week.     Friday - Saturday SOC - schedule subsequent discipline evaluations on consecutive days starting Monday of the following week.    For all post-discharge communication and subsequent orders please contact patient's primary care physician.     Miscellaneous   Suprapubic catheter Care: Instruct patient/caregiver to empty Tiwari bag.  Change Tiwari every month - last on 2/24/2022    Home Health Aide:  Nursing Three times weekly, Physical Therapy Three times weekly, Occupational Therapy Three times weekly and Home Health Aide Three times weekly    Wound Care Orders  - cleanse buttocks with cleansing cloths or sea cleanse wound cleanser, then apply a thin layer of Triad ointment to affected areas BID/PRN after cleaning. Leave open to air. No cover dressing needed.    I certify that this patient is confined to her home and needs intermittent skilled nursing care, physical therapy and occupational therapy.    Resume all other previous orders.  Note: patient is no longer on dialysis.    Dorothy Kraus MD

## 2022-02-25 NOTE — ASSESSMENT & PLAN NOTE
- Continue lopressor, lisinopril and clonidine  - Discontinued Lasix   - Titrate BP meds accordingly  - BP elevated 2/23: avoid hydralazine due to resolving ZAK per Nephrology. Started Nifedipine; received 90 mg on 2/23. Clonidine increased to 0.3 mg. Torsemide started.  -nifedipine increased on 2/24

## 2022-02-25 NOTE — PROGRESS NOTES
[facility]aff[facility] - [facility] ([address] Medicine  Telemedicine Progress Note    Patient Name: Cecile Bowen  MRN: [id number]  Patient Class: IP- Inpatient   Admission Date: 2/11/2022  Length of Stay: 10 days  Attending Physician: Dorothy Kraus MD  Primary Care Provider: Kailey Navaror MD          Subjective:     Principal Problem:Acute cystitis with hematuria        HPI:  Cecile Bowen is a 71 y.o. female with PMHx significant for HTN, HLD, hx of ESBL UTI, ESRD on HD admitted to observation for hypotension, found to have a UTI. Patient was at her dialysis clinic today when she was found to have a BP of 76/43 and advised to come to the ED for evaluation. Reports her SBPs usually run in the 120s. Endorses cloudy urine in her catheter bag for the past few days, and reports it was last changed about a week ago. Denies lightheadedness, dizziness, SOB, fatigue, weakness, HA, CP, cough, abdominal pain, n/v. Patient was recently hospitalized for a ESBL UTI for which she completed her course of meropenem. Of note, patient also has a right subclavian dialysis access that was also changed about a week ago.     In the ED, BP 94/24 improved to 102/57. Remaining VSS. WBC 8.06. Lactic 1.8. Procal 0.12. Hgb 8.5 (~BL). . Cr 2.3 (~BL). Glucose 224. UA with 1+ leuks, 28 RBCs, >100 WBCs. CXR with no acute processes. Given Vanc 2g x 1.       Overview/Hospital Course:  Admitted to  for possible UTI. Cultures obtained, suprapubic catheter changes apparently in the ED, and started Abx. Nephrology consulted for HD. Pt continued on broad antibiotics w/improvement in signs and symptoms. Final culture results w/klebsiella pneumonia- susceptible to connie and bactrim. Urology evaluated patient, 02/14. Pt w/h/o Lt ureteral stone- s/p stent placement 06/2021 and replacement 12/2021- intermittently lost to follow-up. Planning for ureteroscopy 02/18 w/definitive stone management and stent removal as patient  has high likelihood to continue to be lost to f/u. Agreed that it was in the best interest of the patient to remain inpatient to undergo further interventions/evaluations via urology. Pt was transitioned to bactrim 02/16- connie stopped.        This encounter was provided through telemedicine.  Patient was transferred to the telemedicine service on:  02/17/2022   The [facility] location is: [facility] admitted 2/11/2022  8:18 AM.  Present with the patient at the time of the telemed/virtual assessment: n/a    Interval History/Overnight Events:   Clinical record since admit reviewed.    Patient states urinating well once suprapubic changed - home health changes monthly at home; nursing noted some thick drainage when changed so Urology to evaluate    Markedly elevated BP this am with improvement after AM labs. Discussed home diet and importance of maintaining a low Na diet.    Review of Systems   Constitutional:  Positive for fatigue. Negative for fever.   Respiratory:  Negative for cough and shortness of breath.    Cardiovascular:  Negative for chest pain.   Gastrointestinal:  Negative for diarrhea and vomiting.   Neurological:  Negative for headaches.      Inpatient Medications:  Scheduled Meds:   anastrozole  1 mg Oral Daily    apixaban  2.5 mg Oral BID    atorvastatin  80 mg Oral Daily    cloNIDine  0.3 mg Oral TID    famotidine  20 mg Oral Daily    insulin aspart U-100  4 Units Subcutaneous TIDWM    insulin detemir U-100  13 Units Subcutaneous Daily    levothyroxine  50 mcg Oral Before breakfast    lisinopriL  40 mg Oral Daily    magnesium oxide  400 mg Oral Daily    metoprolol tartrate  25 mg Oral BID    mupirocin   Nasal BID    NIFEdipine  60 mg Oral BID    oxybutynin  10 mg Oral Daily    senna-docusate 8.6-50 mg  1 tablet Oral BID    torsemide  5 mg Oral Daily    venlafaxine  150 mg Oral Daily     Continuous Infusions:  PRN Meds:.acetaminophen, bisacodyL, cloNIDine, dextrose 10%, dextrose 10%,  glucagon (human recombinant), glucose, glucose, HYDROcodone-acetaminophen, influenza, insulin aspart U-100, melatonin, methocarbamoL, naloxone, ondansetron, polyethylene glycol, promethazine, sodium chloride 0.9%, sodium chloride 0.9%, sumatriptan, traZODone      Objective:     Temp:  [97.7 °F (36.5 °C)-99.2 °F (37.3 °C)] 98.3 °F (36.8 °C)  Pulse:  [65-91] 69  Resp:  [18-20] 18  SpO2:  [94 %-97 %] 94 %  BP: (131-210)/() 158/70      Intake/Output Summary (Last 24 hours) at 2/24/2022 1536  Last data filed at 2/24/2022 1030  Gross per 24 hour   Intake --   Output 600 ml   Net -600 ml        Body mass index is 40.92 kg/m².    Physical Exam  Vitals and nursing note reviewed.   Constitutional:       General: She is not in acute distress.     Appearance: She is obese.   HENT:      Head: Normocephalic and atraumatic.   Eyes:      General: No scleral icterus.        Right eye: No discharge.         Left eye: No discharge.      Extraocular Movements: Extraocular movements intact.   Cardiovascular:      Rate and Rhythm: Normal rate.   Pulmonary:      Effort: Pulmonary effort is normal. No tachypnea or respiratory distress.   Neurological:      General: No focal deficit present.      Mental Status: She is alert and oriented to person, place, and time.      Cranial Nerves: No cranial nerve deficit.      Motor: No weakness.   Psychiatric:         Attention and Perception: Attention normal.         Mood and Affect: Mood and affect normal.         Speech: Speech normal.         Behavior: Behavior is cooperative.        Labs:  Recent Results (from the past 24 hour(s))   POCT glucose    Collection Time: 02/23/22  4:34 PM   Result Value Ref Range    POCT Glucose 164 (H) 70 - 110 mg/dL   Protein/Creatinine Ratio, Urine    Collection Time: 02/23/22  5:45 PM   Result Value Ref Range    Protein, Urine Random 193 (H) 0 - 15 mg/dL    Creatinine, Urine 18.0 15.0 - 325.0 mg/dL    Prot/Creat Ratio, Urine 10.72 (H) 0.00 - 0.20   Sodium,  Random Urine    Collection Time: 02/23/22  5:45 PM   Result Value Ref Range    Sodium, Urine 58 20 - 250 mmol/L   Osmolality, Urine    Collection Time: 02/23/22  5:45 PM   Result Value Ref Range    Osmolality, Urine 189 50 - 1200 mOsm/kg   Creatinine, Random Urine    Collection Time: 02/23/22  5:45 PM   Result Value Ref Range    Creatinine, Urine 18.0 15.0 - 325.0 mg/dL   Potassium, Random Urine    Collection Time: 02/23/22  5:45 PM   Result Value Ref Range    Potassium, Urine <10 (L) 15 - 95 mmol/L   Urea Nitrogen, Random Urine    Collection Time: 02/23/22  5:45 PM   Result Value Ref Range    Urine Urea Nitrogen 137 (L) 140 - 1050 mg/dL   Chloride, Random Urine    Collection Time: 02/23/22  5:45 PM   Result Value Ref Range    Chloride, Urine 52 25 - 200 mmol/L   Uric Acid, Random Urine    Collection Time: 02/23/22  5:45 PM   Result Value Ref Range    Uric Acid, Urine 13.0 <70.0 mg/dL   POCT glucose    Collection Time: 02/23/22  7:40 PM   Result Value Ref Range    POCT Glucose 290 (H) 70 - 110 mg/dL   CBC Without Differential    Collection Time: 02/24/22  3:07 AM   Result Value Ref Range    WBC 6.63 3.90 - 12.70 K/uL    RBC 2.82 (L) 4.00 - 5.40 M/uL    Hemoglobin 8.4 (L) 12.0 - 16.0 g/dL    Hematocrit 25.5 (L) 37.0 - 48.5 %    MCV 90 82 - 98 fL    MCH 29.8 27.0 - 31.0 pg    MCHC 32.9 32.0 - 36.0 g/dL    RDW 13.9 11.5 - 14.5 %    Platelets 288 150 - 450 K/uL    MPV 9.6 9.2 - 12.9 fL   Uric Acid    Collection Time: 02/24/22  3:07 AM   Result Value Ref Range    Uric Acid 7.3 (H) 2.4 - 5.7 mg/dL   POCT glucose    Collection Time: 02/24/22  7:37 AM   Result Value Ref Range    POCT Glucose 165 (H) 70 - 110 mg/dL   Renal function panel    Collection Time: 02/24/22 11:10 AM   Result Value Ref Range    Glucose 189 (H) 70 - 110 mg/dL    Sodium 133 (L) 136 - 145 mmol/L    Potassium 4.4 3.5 - 5.1 mmol/L    Chloride 103 95 - 110 mmol/L    CO2 21 (L) 23 - 29 mmol/L    BUN 28 (H) 8 - 23 mg/dL    Calcium 8.8 8.7 - 10.5 mg/dL     Creatinine 1.9 (H) 0.5 - 1.4 mg/dL    Albumin 2.1 (L) 3.5 - 5.2 g/dL    Phosphorus 4.5 2.7 - 4.5 mg/dL    eGFR if African American 30.6 (A) >60 mL/min/1.73 m^2    eGFR if non  26.5 (A) >60 mL/min/1.73 m^2    Anion Gap 9 8 - 16 mmol/L   POCT glucose    Collection Time: 02/24/22 12:29 PM   Result Value Ref Range    POCT Glucose 183 (H) 70 - 110 mg/dL        Lab Results   Component Value Date    [id number] Not Detected 02/15/2022       Recent Labs   Lab 02/20/22 0222 02/22/22  0323 02/24/22  0307   WBC 6.78 6.22 6.63   HGB 8.0* 8.3* 8.4*   HCT 25.9* 26.4* 25.5*    290 288     Recent Labs   Lab 02/22/22  0808 02/23/22  0342 02/24/22  1110   * 130* 133*   K 4.4 3.9 4.4    100 103   CO2 21* 21* 21*   BUN 29* 28* 28*   CREATININE 1.9* 2.0* 1.9*   * 169* 189*   CALCIUM 9.0 8.9 8.8   MG 1.8  --   --    PHOS 4.5 4.2 4.5     Recent Labs   Lab 02/19/22  0316 02/20/22 0222 02/21/22  0349 02/21/22  1626 02/22/22  0808 02/23/22  0342 02/24/22  1110   ALKPHOS 108 107 105  --   --   --   --    ALT 7* 7* 7*  --   --   --   --    AST 11 9* 10  --   --   --   --    ALBUMIN 1.9* 1.9* 1.9*   < > 2.1* 1.9* 2.1*   PROT 5.3* 5.1* 5.2*  --   --   --   --    BILITOT 0.2 0.2 0.2  --   --   --   --     < > = values in this interval not displayed.        No results for input(s): DDIMER, FERRITIN, CRP, LDH, BNP, TROPONINI, CPK in the last 72 hours.    Invalid input(s): PROCALCITONIN    All labs within the last 24 hours were reviewed.     Microbiology:  Microbiology Results (last 7 days)       ** No results found for the last 168 hours. **              Imaging  ECG Results              EKG 12-lead (Final result)  Result time 02/12/22 09:41:30      Final result by Interface, Lab In [facility] (02/12/22 09:41:30)                   Narrative:    Test Reason :     Vent. Rate : 057 BPM     Atrial Rate : 059 BPM     P-R Int : 184 ms          QRS Dur : 080 ms      QT Int : 426 ms       P-R-T Axes : -40 -13 -15  degrees     QTc Int : 415 ms    Sinus bradycardia  Voltage criteria for left ventricular hypertrophy  Lateral infarct (cited on or before 11-FEB-2022)  Abnormal ECG  When compared with ECG of 11-FEB-2022 08:39,  No significant change was found  Confirmed by KEVYN GARSIA MD ([id number]) on 2/12/2022 9:41:21 AM    Referred By: [email or web address]   SELF           Confirmed By:KEVYN GARSIA MD                                     EKG 12-lead (Final result)  Result time 02/12/22 09:39:20      Final result by Interface, Lab In [facility] (02/12/22 09:39:20)                   Narrative:    Test Reason : I95.9,    Vent. Rate : 066 BPM     Atrial Rate : 068 BPM     P-R Int : 000 ms          QRS Dur : 072 ms      QT Int : 402 ms       P-R-T Axes : 000 -15 -08 degrees     QTc Int : 422 ms    Technically poor ECG tracing  Sinus rhythm  Voltage criteria for left ventricular hypertrophy  Lateral infarct ,age undetermined  Abnormal ECG  When compared with ECG of 21-JAN-2022 12:55,  Questionable change in in R wave progression  Lateral infarct is now Present  T wave inversion now evident in Inferior leads  Confirmed by KEVYN GARSIA MD ([id number]) on 2/12/2022 9:39:06 AM    Referred By: [email or web address]   SELF           Confirmed By:KEVYN GARSIA MD                                    Results for orders placed during the hospital encounter of 11/28/21    Echo    Interpretation Summary  · The left ventricle is normal in size with normal systolic function. The estimated ejection fraction is 60%.  · Normal right ventricular size with normal right ventricular systolic function.  · Normal left ventricular diastolic function.  · Mechanically ventilated; cannot use inferior caval vein diameter to estimate central venous pressure.      SURG FL Surgery Fluoro Usage  See OP Notes for results.     IMPRESSION: See OP Notes for results.     This procedure was auto-finalized by: Virtual Radiologist      All imaging within the last 24 hours was reviewed.        Discharge Planning   FLORENTINO: 2/25/2022     Code Status: Full Code   Is the patient medically ready for discharge?: No    Reason for patient still in hospital (select all that apply): Patient trending condition, Treatment, and Pending disposition  Discharge Plan A: Home with family, Home Health   Discharge Delays: None known at this time        Assessment/Plan:      * Acute cystitis with hematuria  - SIRS: 0/4; hypotension  - Hx of ESBL UTI; completed course of meropenem  - UA with 1+ leuks, 28 RBCs, >100 WBCs  - UCx w/GNR >100,000- susceptibilities to connie and bactrim- continue antibiotics leading up to ureteroscopy and dc post-op- stop connie - transition to Bactrim DS qday until 02/19  - Given Vanc in ED   - suprapubic catheter changed in the ED, and most recently changed in OR 2/18 and on 2/24 due to leaking    CKD (chronic kidney disease) requiring chronic dialysis  - HD MWF  - Nephrology following  - continue sevelamer carbonate 800mg TIDW  - improving  - IV hydration following Cystoscope 2/18.  - sCr and potassium elevated 2/19; trial of Lokelma and 1 L NS. Monitor closely  - sCr increasing but potassium improved with Lokelma.  - sCr stable; monitoring without HD or Lokelma  - sCr improving  - discontinued sevelamer    Pressure injury of right buttock, stage 3  - present on admission  - wound care consulted, appreciate recs: Nursing to cleanse buttocks with cleansing cloths or sea cleanse wound cleanser, then apply a thin layer of Triad ointment to affected areas BID/PRN after cleaning. Leave open to air. No cover dressing needed.  - turn patient q2h    Urinary tract infection due to ESBL Klebsiella  - completed antibiotics    Secondary hypertension  - Continue lopressor, lisinopril and clonidine  - Discontinued Lasix   - Titrate BP meds accordingly  - BP elevated 2/23: avoid hydralazine due to resolving ZAK per Nephrology. Started Nifedipine; received 90 mg on 2/23. Clonidine increased to 0.3 mg. Torsemide  started.  -nifedipine increased on 2/24    A-fib  - continue Eliquis 2.5mg BID  - continue Lopressor 25mg BID     Left ureteral stone  - h/o left ureteral stone and indwelling left ureteral stent  - stent placed 06/30/21- pt lost to follow up - stent exchanged 12/23/21  - Urology following: plan for ureteroscopy w/laser lithotripsy vs. Stone basket extraction vs. Stent replacement 02/18/22  - Per Urology on 2/18:  Left ureteroscopy, Cystoscopy,  Left ureteral biopsy,  Left JJ ureteral stent exchange,  Left retrograde pyelogram.   - Stent with strings removed 2/21.  - Follow up with Urology    Malignant neoplasm of left breast, estrogen receptor positive  - continue home anastrozole 1mg daily    CKD stage 4 due to type 2 diabetes mellitus  ZAK requiring HD has improved and patient no longer requires HD  - HD MWF  - Nephrology following  - continue sevelamer carbonate 800mg TIDW  - improving  - IV hydration following Cystoscope 2/18.  - sCr and potassium elevated 2/19; trial of Lokelma and 1 L NS. Monitor closely  - when potassium elevated, improved with Lokelma.  - sCr stable; monitoring without HD or Lokelma  - sCr improving and HD discontinued  - discontinued sevelamer      Chronic pain  - follows w/pain specialist  - Improved back pain   - continue Norco 10mg q6h for moderate pain    Suprapubic catheter  - exchanged Q month - last on 2/24  -home helath changes at home    Uncontrolled type 2 diabetes mellitus with stage 4 chronic kidney disease, with long-term current use of insulin  - uncontrolled on admit  - continued Levemir 13 units daily, Novolog 4 units TIDW, SSI  - up titrate as needed  - resume home insulin regimen at discharge    Mixed migraine and muscle contraction headache  - stable  - home Fioricet and sumatriptan prn    Neurogenic bladder  - noted, has suprapubic catheter   - SPC changed on 2/24 - monitor site    Urinary retention  - continue home oxybutynin 10mg daily    Recurrent urinary tract  infection  - see 'UTI due to Klebsiella'    Hyperlipidemia associated with type 2 diabetes mellitus  - continue Lipitor 80mg daily    Hypothyroidism  - continue home Synthroid 50mcg      VTE Risk Mitigation (From admission, onward)         Ordered     heparin (porcine) injection 1,000 Units  As needed (PRN)         02/12/22 1104     apixaban tablet 2.5 mg  2 times daily         02/11/22 1342     IP VTE HIGH RISK PATIENT  Once         02/11/22 1132     Place sequential compression device  Until discontinued         02/11/22 1132                  I have assessed these findings virtually using a telemed platform and with assistance of the bedside nurse.        The attending portion of this evaluation, treatment, and documentation was performed per Dorothy Kraus MD via Telemedicine AudioVisual using the secure unamia software platform with 2 way audio/video. The provider was located off-site and the patient is located in the hospital. The aforementioned video software was utilized to document the relevant history and physical exam    Dorothy Kraus MD  Department of Hospital Medicine   [facility] - Telemetry Stepdown ([place]-[facility])

## 2022-02-25 NOTE — PLAN OF CARE
Pt has been given and explained discharge information.  She is awaiting ambulance transport to her home.  It was scheduled for 1400 pickup but is delayed.   Problem: Adult Inpatient Plan of Care  Goal: Plan of Care Review  Outcome: Met  Goal: Patient-Specific Goal (Individualized)  Outcome: Met  Goal: Absence of Hospital-Acquired Illness or Injury  Outcome: Met  Goal: Optimal Comfort and Wellbeing  Outcome: Met  Goal: Readiness for Transition of Care  Outcome: Met     Problem: Bariatric Environmental Safety  Goal: Safety Maintained with Care  Outcome: Met     Problem: Device-Related Complication Risk (Hemodialysis)  Goal: Safe, Effective Therapy Delivery  Outcome: Met     Problem: Hemodynamic Instability (Hemodialysis)  Goal: Effective Tissue Perfusion  Outcome: Met     Problem: Infection (Hemodialysis)  Goal: Absence of Infection Signs and Symptoms  Outcome: Met     Problem: Diabetes Comorbidity  Goal: Blood Glucose Level Within Targeted Range  Outcome: Met     Problem: Fluid and Electrolyte Imbalance (Acute Kidney Injury/Impairment)  Goal: Fluid and Electrolyte Balance  Outcome: Met     Problem: Renal Function Impairment (Acute Kidney Injury/Impairment)  Goal: Effective Renal Function  Outcome: Met     Problem: Infection  Goal: Absence of Infection Signs and Symptoms  Outcome: Met     Problem: Impaired Wound Healing  Goal: Optimal Wound Healing  Outcome: Met     Problem: Skin Injury Risk Increased  Goal: Skin Health and Integrity  Outcome: Met     Problem: Electrolyte Imbalance (Chronic Kidney Disease)  Goal: Electrolyte Balance  Outcome: Met     Problem: Hypertension Acute  Goal: Blood Pressure Within Desired Range  Outcome: Met     Problem: Fall Injury Risk  Goal: Absence of Fall and Fall-Related Injury  Outcome: Met

## 2022-02-25 NOTE — ASSESSMENT & PLAN NOTE
- SIRS: 0/4; hypotension  - Hx of ESBL UTI; completed course of meropenem  - UA with 1+ leuks, 28 RBCs, >100 WBCs  - UCx w/GNR >100,000- susceptibilities to connie and bactrim- continue antibiotics leading up to ureteroscopy and dc post-op- stop connie - transition to Bactrim DS qday until 02/19  - Given Vanc in ED   - suprapubic catheter changed in the ED, and most recently changed in OR 2/18 and on 2/24 due to leaking

## 2022-02-25 NOTE — PLAN OF CARE
Per provider patient is medically ready to discharge with HH.  [facility] is her HH company.  CM faxed patient's HH orders to the fax number given to CM by Natan at  office.  Transport is set up for 1400.  RN, Patient, and family aware.    Rosario Banda RN,   Case Management  [phone]

## 2022-02-25 NOTE — ASSESSMENT & PLAN NOTE
ZAK requiring HD has improved and patient no longer requires HD  - HD MWF  - Nephrology following  - continue sevelamer carbonate 800mg TIDW  - improving  - IV hydration following Cystoscope 2/18.  - sCr and potassium elevated 2/19; trial of Lokelma and 1 L NS. Monitor closely  - when potassium elevated, improved with Lokelma.  - sCr stable; monitoring without HD or Lokelma  - sCr improving and HD discontinued  - discontinued sevelamer

## 2022-02-28 ENCOUNTER — PATIENT OUTREACH (OUTPATIENT)
Dept: ADMINISTRATIVE | Facility: CLINIC | Age: 72
End: 2022-02-28
Payer: MEDICARE

## 2022-03-02 ENCOUNTER — PES CALL (OUTPATIENT)
Dept: ADMINISTRATIVE | Facility: CLINIC | Age: 72
End: 2022-03-02
Payer: MEDICARE

## 2022-03-03 ENCOUNTER — PATIENT OUTREACH (OUTPATIENT)
Dept: ADMINISTRATIVE | Facility: OTHER | Age: 72
End: 2022-03-03
Payer: MEDICARE

## 2022-03-03 ENCOUNTER — CARE AT HOME (OUTPATIENT)
Dept: HOME HEALTH SERVICES | Facility: CLINIC | Age: 72
End: 2022-03-03
Payer: MEDICARE

## 2022-03-03 DIAGNOSIS — N11.1 CHRONIC OBSTRUCTIVE PYELONEPHRITIS: ICD-10-CM

## 2022-03-03 DIAGNOSIS — N18.6 CKD (CHRONIC KIDNEY DISEASE) REQUIRING CHRONIC DIALYSIS: ICD-10-CM

## 2022-03-03 DIAGNOSIS — Z93.59 SUPRAPUBIC CATHETER: Chronic | ICD-10-CM

## 2022-03-03 DIAGNOSIS — Z99.2 CKD (CHRONIC KIDNEY DISEASE) REQUIRING CHRONIC DIALYSIS: ICD-10-CM

## 2022-03-03 PROCEDURE — 99499 RISK ADDL DX/OHS AUDIT: ICD-10-PCS | Mod: S$GLB,,, | Performed by: NURSE PRACTITIONER

## 2022-03-03 PROCEDURE — 99350 PR HOME VISIT,ESTAB PATIENT,LEVEL IV: ICD-10-PCS | Mod: S$GLB,,, | Performed by: NURSE PRACTITIONER

## 2022-03-03 PROCEDURE — 99350 HOME/RES VST EST HIGH MDM 60: CPT | Mod: S$GLB,,, | Performed by: NURSE PRACTITIONER

## 2022-03-03 PROCEDURE — 99499 UNLISTED E&M SERVICE: CPT | Mod: S$GLB,,, | Performed by: NURSE PRACTITIONER

## 2022-03-03 PROCEDURE — 99497 PR ADVNCD CARE PLAN 30 MIN: ICD-10-PCS | Mod: S$GLB,,, | Performed by: NURSE PRACTITIONER

## 2022-03-03 PROCEDURE — 99497 ADVNCD CARE PLAN 30 MIN: CPT | Mod: S$GLB,,, | Performed by: NURSE PRACTITIONER

## 2022-03-03 NOTE — PROGRESS NOTES
Health Maintenance Due   Topic Date Due    TETANUS VACCINE  Never done    Shingles Vaccine (2 of 3) 02/10/2014    Mammogram  07/08/2020    Foot Exam  10/11/2020    Eye Exam  10/28/2020    Influenza Vaccine (1) 09/01/2021    Pneumococcal Vaccines (Age 65+) (3 of 4 - PPSV23) 09/19/2021    Colorectal Cancer Screening  01/13/2022     Updates were requested from care everywhere.  Chart was reviewed for overdue Proactive [facility] Encounters ([facility]) topics (CRS, Breast Cancer Screening, Eye exam)  Health Maintenance has been updated.  LINKS immunization registry triggered.  Immunizations were reconciled.

## 2022-03-05 NOTE — DISCHARGE SUMMARY
[facility] [address])  [facility] Medicine  Discharge Summary      Patient Name: Cecile Bowen  MRN: [id number]  Patient Class: IP- Inpatient  Admission Date: 2/11/2022  Hospital Length of Stay: 11 days  Discharge Date and Time: 2/25/2022  7:44 PM  Attending Physician: No att. providers found   Discharging Provider: Dorothy Kraus MD  Primary Care Provider: Kailey Navarro MD      HPI:   Cecile Bowen is a 71 y.o. female with PMHx significant for HTN, HLD, hx of ESBL UTI, ESRD on HD admitted to observation for hypotension, found to have a UTI. Patient was at her dialysis clinic today when she was found to have a BP of 76/43 and advised to come to the ED for evaluation. Reports her SBPs usually run in the 120s. Endorses cloudy urine in her catheter bag for the past few days, and reports it was last changed about a week ago. Denies lightheadedness, dizziness, SOB, fatigue, weakness, HA, CP, cough, abdominal pain, n/v. Patient was recently hospitalized for a ESBL UTI for which she completed her course of meropenem. Of note, patient also has a right subclavian dialysis access that was also changed about a week ago.     In the ED, BP 94/24 improved to 102/57. Remaining VSS. WBC 8.06. Lactic 1.8. Procal 0.12. Hgb 8.5 (~BL). . Cr 2.3 (~BL). Glucose 224. UA with 1+ leuks, 28 RBCs, >100 WBCs. CXR with no acute processes. Given Vanc 2g x 1.       Procedure(s) (LRB):  CYSTOSCOPY  PYELOGRAM, RETROGRADE (Left)  URETEROSCOPY (Left)  REPLACEMENT, STENT (Left)  REMOVAL-STENT  EXCHANGE SUPRAPUBIC CATHETER  BIOPSY, URETER (Left)      Hospital Course:   Admitted to  for possible UTI. Cultures obtained, suprapubic catheter changes apparently in the ED, and started Abx. Nephrology consulted for HD. Pt continued on broad antibiotics w/improvement in signs and symptoms. Final culture results w/klebsiella pneumonia- susceptible to connie and bactrim. Urology evaluated patient, 02/14. Pt w/h/o Lt  ureteral stone- s/p stent placement 06/2021 and replacement 12/2021- intermittently lost to follow-up. Planning for ureteroscopy 02/18 w/definitive stone management and stent removal as patient has high likelihood to continue to be lost to f/u. Agreed that it was in the best interest of the patient to remain inpatient to undergo further interventions/evaluations via urology. Pt was transitioned to bactrim 02/16- 2/19 and connie stopped. To OR with Urology on 2/18:  Left ureteroscopy, Cystoscopy,  Left ureteral biopsy,  Left JJ ureteral stent exchange,  Left retrograde pyelogram. Nurse removed stent on strings on 2/21.  Apixaban resumed.   Nephrology followed and discontinued HD while admitted.  Patient to retain HD catheter until f/u with nephrology.  Cr stable at 1.9 at discharge. Patient discharged with home health.        Goals of Care Treatment Preferences:  Code Status: Full Code    Living Will: Yes              Consults:   Consults (From admission, onward)        Status Ordering Provider     Inpatient consult to Infectious Diseases  Once        Provider:  (Not yet assigned)    Completed MECHELLE REIS     Inpatient virtual consult to Hospital Medicine  Once        Provider:  (Not yet assigned)    Completed LANNY VYAS     Inpatient virtual consult to Hospital Medicine  Once        Provider:  (Not yet assigned)    Completed LANNY VYAS     Inpatient consult to Nephrology  Once        Provider:  (Not yet assigned)    Completed ELMER PHILLIPS     Inpatient consult to Registered Dietitian/Nutritionist  Once        Provider:  (Not yet assigned)    Completed LUTHER LARRY          * Acute cystitis with hematuria  - SIRS: 0/4; hypotension  - Hx of ESBL UTI; completed course of meropenem  - UA with 1+ leuks, 28 RBCs, >100 WBCs  - UCx w/GNR >100,000- susceptibilities to connie and bactrim- continue antibiotics leading up to ureteroscopy and dc post-op- stop connie - transition to Bactrim DS qday until  02/19  - Given Vanc in ED   - suprapubic catheter changed in the ED, and most recently changed in OR 2/18 and on 2/24 due to leaking    CKD (chronic kidney disease) requiring chronic dialysis  - HD MWF  - Nephrology following  - continue sevelamer carbonate 800mg TIDW  - improving  - IV hydration following Cystoscope 2/18.  - sCr and potassium elevated 2/19; trial of Lokelma and 1 L NS. Monitor closely  - sCr increasing but potassium improved with Lokelma.  - sCr stable; monitoring without HD or Lokelma  - sCr improving  - discontinued sevelamer    Pressure injury of right buttock, stage 3  - present on admission  - wound care consulted, appreciate recs: Nursing to cleanse buttocks with cleansing cloths or sea cleanse wound cleanser, then apply a thin layer of Triad ointment to affected areas BID/PRN after cleaning. Leave open to air. No cover dressing needed.  - turn patient q2h    Urinary tract infection due to ESBL Klebsiella  - completed antibiotics    Secondary hypertension  - Continue lopressor, lisinopril and clonidine  - Discontinued Lasix   - Titrate BP meds accordingly  - BP elevated 2/23: avoid hydralazine due to resolving ZAK per Nephrology. Started Nifedipine; received 90 mg on 2/23. Clonidine increased to 0.3 mg. Torsemide started.  -nifedipine increased on 2/24    A-fib  - continue Eliquis 2.5mg BID  - continue Lopressor 25mg BID     Left ureteral stone  - h/o left ureteral stone and indwelling left ureteral stent  - stent placed 06/30/21- pt lost to follow up - stent exchanged 12/23/21  - Urology following: plan for ureteroscopy w/laser lithotripsy vs. Stone basket extraction vs. Stent replacement 02/18/22  - Per Urology on 2/18:  Left ureteroscopy, Cystoscopy,  Left ureteral biopsy,  Left JJ ureteral stent exchange,  Left retrograde pyelogram.   - Stent with strings removed 2/21.  - Follow up with Urology    Malignant neoplasm of left breast, estrogen receptor positive  - continue home anastrozole  1mg daily    CKD stage 4 due to type 2 diabetes mellitus  ZAK requiring HD has improved and patient no longer requires HD  - HD MWF  - Nephrology following  - continue sevelamer carbonate 800mg TIDW  - improving  - IV hydration following Cystoscope 2/18.  - sCr and potassium elevated 2/19; trial of Lokelma and 1 L NS. Monitor closely  - when potassium elevated, improved with Lokelma.  - sCr stable; monitoring without HD or Lokelma  - sCr improving and HD discontinued  - discontinued sevelamer      Chronic pain  - follows w/pain specialist  - Improved back pain   - continue Norco 10mg q6h for moderate pain    Suprapubic catheter  - exchanged Q month - last on 2/24  -home health changes at home    Uncontrolled type 2 diabetes mellitus with stage 4 chronic kidney disease, with long-term current use of insulin  - uncontrolled on admit  - continued Levemir 13 units daily, Novolog 4 units TIDW, SSI  - up titrate as needed  - resume home insulin regimen at discharge    Mixed migraine and muscle contraction headache  - stable  - home Fioricet and sumatriptan prn    Neurogenic bladder  - noted, has suprapubic catheter   - SPC changed on 2/24   -continue monthly change with home health    Urinary retention  - continue home oxybutynin 10mg daily    Recurrent urinary tract infection  - see 'UTI due to Klebsiella'    Hyperlipidemia associated with type 2 diabetes mellitus  - continue Lipitor 80mg daily    Hypothyroidism  - continue home Synthroid 50mcg      Final Active Diagnoses:    Diagnosis Date Noted POA    PRINCIPAL PROBLEM:  Acute cystitis with hematuria [N30.01] 02/11/2022 Yes    CKD (chronic kidney disease) requiring chronic dialysis [N18.6, Z99.2] 02/11/2022 Not Applicable    Pressure injury of right buttock, stage 3 [L89.313] 01/26/2022 Yes    Urinary tract infection due to ESBL Klebsiella [N39.0, B96.89]  Yes    Secondary hypertension [I15.9] 01/24/2022 Yes    A-fib [I48.91] 11/29/2021 Yes     Chronic    Left  ureteral stone [N20.1] 06/30/2021 Yes    Malignant neoplasm of left breast, estrogen receptor positive [C50.912, Z17.0] 08/01/2019 Not Applicable     Chronic    CKD stage 4 due to type 2 diabetes mellitus [E11.22, N18.4] 01/20/2019 Yes     Chronic    Chronic pain [G89.29] 03/15/2018 Yes     Chronic    Suprapubic catheter [Z93.59] 09/19/2016 Not Applicable     Chronic    Uncontrolled type 2 diabetes mellitus with stage 4 chronic kidney disease, with long-term current use of insulin [E11.22, E11.65, N18.4, Z79.4] 07/06/2016 Not Applicable    Mixed migraine and muscle contraction headache [G43.909, G44.209] 03/10/2016 Yes    Neurogenic bladder [N31.9] 12/14/2015 Yes     Chronic    Urinary retention [R33.9] 11/10/2015 Yes    Recurrent urinary tract infection [N39.0] 02/24/2015 Yes    Hyperlipidemia associated with type 2 diabetes mellitus [E11.69, E78.5] 03/11/2014 Yes     Chronic    Hypothyroidism [E03.9] 12/12/2012 Yes     Chronic      Problems Resolved During this Admission:       Discharged Condition: stable    Disposition: Home or Self Care    Follow Up:   Follow-up Information     Kailey Navarro MD. Schedule an appointment as soon as possible for a visit in 1 week(s).    Specialty: Internal Medicine  Why: For discharge from hospital follow up  Contact information:  [address] [facility] [address]  [phone]             Michael López MD. Schedule an appointment as soon as possible for a visit in 3 week(s).    Specialty: Nephrology  Why: For discharge from hospital follow up, As previously planned  Contact information:  [address] [facility] [address]  [phone]             Ronel Whittington MD. Schedule an appointment as soon as possible for a visit in 1 week(s).    Specialty: Urology  Why: For discharge from hospital follow up  Contact information:  [address] [facility] [address]  [phone]             Rosaline Feliz MD. Go on 3/3/2022.     Specialty: Nephrology  Why: For discharge from hospital follow up  Contact information:  [address] Shaffer  [facility] [address]  [phone]                       Patient Instructions:      Ambulatory referral/consult to [facility]   Standing Status: Future   Referral Priority: Routine Referral Type: Consultation   Referral Reason: Specialty Services Required   Number of Visits Requested: 1     Ambulatory referral/consult to Urology   Standing Status: Future   Referral Priority: Routine Referral Type: Consultation   Referral Reason: Specialty Services Required   Requested Specialty: Urology   Number of Visits Requested: 1     Ambulatory referral/consult to Urology   Standing Status: Future   Referral Priority: Routine Referral Type: Consultation   Referral Reason: Specialty Services Required   Requested Specialty: Urology     Diet diabetic     Diet renal     Notify your health care provider if you experience any of the following:  temperature >100.4     Notify your health care provider if you experience any of the following:  persistent nausea and vomiting or diarrhea     Notify your health care provider if you experience any of the following:  severe uncontrolled pain     Notify your health care provider if you experience any of the following:  difficulty breathing or increased cough     Notify your health care provider if you experience any of the following:  severe persistent headache     Notify your health care provider if you experience any of the following:  worsening rash     Notify your health care provider if you experience any of the following:  persistent dizziness, light-headedness, or visual disturbances     Notify your health care provider if you experience any of the following:  increased confusion or weakness     Activity as tolerated       Significant Diagnostic Studies: as above    Pending Diagnostic Studies:     None         Medications:  Reconciled Home  "Medications:      Medication List      START taking these medications    HYDROcodone-acetaminophen  mg per tablet  Commonly known as: NORCO  Take 1 tablet by mouth every 8 (eight) hours as needed for Pain.     NIFEdipine 60 MG (OSM) 24 hr tablet  Commonly known as: PROCARDIA-XL  Take 1 tablet (60 mg total) by mouth 2 (two) times a day. HOLD MEDICATION IF SYSTOLIC BLOOD PRESSURE (TOP NUMBER) IS LESS THAN 130.     torsemide 5 MG Tab  Commonly known as: DEMADEX  Take 1 tablet (5 mg total) by mouth once daily.        CHANGE how you take these medications    butalbital-acetaminophen-caffeine -40 mg -40 mg per tablet  Commonly known as: FIORICET, ESGIC  TAKE 1 TABLET BY MOUTH WHEN NOT CONTROLLED BY OTHER MEDS. NO MORE THAN 10 TABS PER 30 DAYS  What changed: See the new instructions.     lisinopriL 40 MG tablet  Commonly known as: PRINIVIL,ZESTRIL  Take 1 tablet (40 mg total) by mouth once daily.  What changed:   · medication strength  · how much to take     methocarbamoL 750 MG Tab  Commonly known as: ROBAXIN  TAKE 1-2 TABLETS (750-1,500 MG TOTAL) BY MOUTH 3 (THREE) TIMES DAILY AS NEEDED.  What changed: how much to take        CONTINUE taking these medications    albuterol 90 mcg/actuation inhaler  Commonly known as: PROVENTIL HFA  Inhale 2 puffs into the lungs every 6 (six) hours as needed for Wheezing or Shortness of Breath. Rescue     anastrozole 1 mg Tab  Commonly known as: ARIMIDEX  TAKE 1 TABLET BY MOUTH EVERY DAY     ARTHRITIS PAIN (DICLOFENAC) 1 % Gel  Generic drug: diclofenac sodium  Apply to chest wall as needed for arthritis     atorvastatin 80 MG tablet  Commonly known as: LIPITOR  Take 1 tablet (80 mg total) by mouth once daily.     BD INSULIN SYRINGE ULTRA-FINE 0.5 mL 31 gauge x 5/16" Syrg  Generic drug: insulin syringe-needle U-100  USE WITH INSULIN 4 TIMES A DAY     * blood sugar diagnostic Strp  ONE TOUCH ULTRA Check blood sugars 1-2 x a day     * blood sugar diagnostic Strp  To check BG " "4  times daily, to use with insurance preferred meter     blood-glucose meter kit  To check BG 4 times daily, to use with insurance preferred meter     cloNIDine 0.2 MG tablet  Commonly known as: CATAPRES  Take 1 tablet (0.2 mg total) by mouth 3 (three) times daily.     ELIQUIS 2.5 mg Tab  Generic drug: apixaban  Take 2.5 mg by mouth 2 (two) times daily.     ergocalciferol 50,000 unit Cap  Commonly known as: ERGOCALCIFEROL  TAKE 1 CAPSULE (50,000 UNITS TOTAL) BY MOUTH EVERY 7 DAYS. TAKE ONE CAPSULE BY MOUTH ONE TIME PER WEEK, TAKES ON TUESDAYS     famotidine 20 MG tablet  Commonly known as: PEPCID  Take 1 tablet (20 mg total) by mouth once daily.     gemfibroziL 600 MG tablet  Commonly known as: LOPID  Take 1 tablet (600 mg total) by mouth every Mon, Wed, Fri.     HumaLOG U-100 Insulin 100 unit/mL injection  Generic drug: insulin lispro  Inject 12 Units into the skin 3 (three) times daily with meals.     * lancets Misc  One touch ultra, checks 1-2 x a day     * lancets Misc  To check BG 4 times daily, to use with insurance preferred meter     LANTUS SOLOSTAR U-100 INSULIN glargine 100 units/mL (3mL) SubQ pen  Generic drug: insulin  Inject 12 Units into the skin once daily.     levothyroxine 50 MCG tablet  Commonly known as: SYNTHROID  Take 1 tablet (50 mcg total) by mouth before breakfast. Administer on an empty stomach at least 30 minutes before food. If receiving tube feeds, HOLD tube feeds for 1 hour before and after levothyroxine administration.     magnesium oxide 400 mg (241.3 mg magnesium) tablet  Commonly known as: MAG-OX  Take 1 tablet (400 mg total) by mouth once daily.     metoprolol tartrate 25 MG tablet  Commonly known as: LOPRESSOR  Take 1 tablet (25 mg total) by mouth 2 (two) times daily.     oxybutynin 10 MG 24 hr tablet  Commonly known as: DITROPAN-XL  TAKE 1 TABLET BY MOUTH EVERY DAY     pen needle, diabetic 31 gauge x 5/16" Ndle  Commonly known as: BD ULTRA-FINE SHORT PEN NEEDLE  USE WITH LANTUS " DAILY, e 11.65     polyethylene glycol 17 gram Pwpk  Commonly known as: GLYCOLAX  One packet QD prn constipation     sumatriptan 100 MG tablet  Commonly known as: IMITREX  TAKE 1 TABLET (100 MG TOTAL) BY MOUTH 2 (TWO) TIMES DAILY AS NEEDED FOR MIGRAINE.     TRANSDERM-SCOP TD  Place 1 patch onto the skin as needed (When cruising).     traZODone 100 MG tablet  Commonly known as: DESYREL  Take 1 tablet (100 mg total) by mouth nightly as needed for Insomnia.     venlafaxine 150 MG Cp24  Commonly known as: EFFEXOR-XR  Take 1 capsule (150 mg total) by mouth once daily.         * This list has 4 medication(s) that are the same as other medications prescribed for you. Read the directions carefully, and ask your doctor or other care provider to review them with you.            STOP taking these medications    furosemide 80 MG tablet  Commonly known as: LASIX     oxyCODONE-acetaminophen  mg per tablet  Commonly known as: PERCOCET     sevelamer carbonate 800 mg Tab  Commonly known as: RENVELA     SODIUM BICARBONATE ORAL            Indwelling Lines/Drains at time of discharge:   Lines/Drains/Airways     Central Venous Catheter Line  Duration                Hemodialysis Catheter right subclavian -- days          Drain  Duration                Suprapubic Catheter 02/24/22 1200 latex 16 Fr. 8 days                Time spent on the discharge of patient: 41 minutes         The attending portion of this evaluation, treatment, and documentation was performed per Dorothy Kraus MD via Telemedicine AudioVisual using the secure [employer] software platform with 2 way audio/video. The provider was located off-site and the patient is located in the hospital. The aforementioned video software was utilized to document the relevant history and physical exam    Dorothy Kraus MD  Department of Hospital Medicine  [facility] - Telemetry Stepdown ([place]

## 2022-03-05 NOTE — ASSESSMENT & PLAN NOTE
- noted, has suprapubic catheter   - SPC changed on 2/24   -continue monthly change with home health

## 2022-03-07 ENCOUNTER — TELEPHONE (OUTPATIENT)
Dept: UROLOGY | Facility: CLINIC | Age: 72
End: 2022-03-07
Payer: MEDICARE

## 2022-03-07 DIAGNOSIS — N20.0 NEPHROLITHIASIS: Primary | ICD-10-CM

## 2022-03-07 RX ORDER — HYDROCODONE BITARTRATE AND ACETAMINOPHEN 10; 325 MG/1; MG/1
1 TABLET ORAL 3 TIMES DAILY PRN
Qty: 90 TABLET | Refills: 0 | Status: SHIPPED | OUTPATIENT
Start: 2022-03-08 | End: 2022-04-07 | Stop reason: SDUPTHER

## 2022-03-07 NOTE — TELEPHONE ENCOUNTER
Last Rx refill-----02/25/22, 20 tablets   Last office visit--07/30/21  Next office visit-- 07/2022

## 2022-03-07 NOTE — TELEPHONE ENCOUNTER
----- Message from Neha Portillo sent at 3/7/2022  3:22 PM CST -----  Contact: DANYELLE FARRIS [[id number]]  Type:  RX Refill Request    Who Called: DANYELLE FARRIS [[id number]]    Refill or New Rx: Refill     RX Name and Strength:HYDROcodone-acetaminophen (NORCO)  mg per tablet    How is the patient currently taking it? (ex. 1XDay):    Is this a 30 day or 90 day RX:    Preferred Pharmacy with phone number:[id number] - [place], [place] - [address]    Local or Mail Order: Local     Ordering Provider:    Would the patient rather a call back or a response via [facility]?     Best Call Back Number: [phone] (mobile)    Additional Information:

## 2022-03-07 NOTE — PROGRESS NOTES
"[facility]  Transition of Care Home Visit    Visit Date: 3/3/2022  Encounter Provider: Susan Julio NP  PCP:  Kailey Navarro MD    PRESENTING HISTORY      Patient ID: Cecile Bowen is a 71 y.o. female.    Consult Requested By:  Dr. Liana Ruiz  Reason for Consult:  Hospital follow up    Cecile is being seen at home due to  physical debility that presents a taxing effort to leave the home, to mitigate high risk of hospital readmission or due to the limited availability of reliable or safe options for transportation to the point of access to the provider. The visit meets the criteria for medical necessity as defined by CMS as "health-care services needed to prevent, diagnose, or treat an illness, injury, condition, disease, or its symptoms and that meet accepted standards of medicine."  Prior to treatment on this visit the chart was reviewed and patient consent was obtained.        Chief Complaint: HTN,ESRD.      History of Present Illness: Ms. Cecile Bowen is a 71 y.o. female who was recently admitted to the hospital.    Admission Date: 2/11/2022  Hospital Length of Stay: 11 days  Discharge Date and Time: 2/25/2022    HPI:   Cecile Bowen is a 71 y.o. female with PMHx significant for HTN, HLD, hx of ESBL UTI, ESRD on HD admitted to observation for hypotension, found to have a UTI. Patient was at her dialysis clinic today when she was found to have a BP of 76/43 and advised to come to the ED for evaluation. Reports her SBPs usually run in the 120s. Endorses cloudy urine in her catheter bag for the past few days, and reports it was last changed about a week ago. Denies lightheadedness, dizziness, SOB, fatigue, weakness, HA, CP, cough, abdominal pain, n/v. Patient was recently hospitalized for a ESBL UTI for which she completed her course of meropenem. Of note, patient also has a right subclavian dialysis access that was also changed about a week ago.      In the ED, BP 94/24 improved to " 102/57. Remaining VSS. WBC 8.06. Lactic 1.8. Procal 0.12. Hgb 8.5 (~BL). . Cr 2.3 (~BL). Glucose 224. UA with 1+ leuks, 28 RBCs, >100 WBCs. CXR with no acute processes. Given Vanc 2g x 1.         Procedure(s) (LRB):  CYSTOSCOPY  PYELOGRAM, RETROGRADE (Left)  URETEROSCOPY (Left)  REPLACEMENT, STENT (Left)  REMOVAL-STENT  EXCHANGE SUPRAPUBIC CATHETER  BIOPSY, URETER (Left)       Hospital Course:   Admitted to  for possible UTI. Cultures obtained, suprapubic catheter changes apparently in the ED, and started Abx. Nephrology consulted for HD. Pt continued on broad antibiotics w/improvement in signs and symptoms. Final culture results w/klebsiella pneumonia- susceptible to connie and bactrim. Urology evaluated patient, . Pt w/h/o Lt ureteral stone- s/p stent placement 2021 and replacement 2021- intermittently lost to follow-up. Planning for ureteroscopy  w/definitive stone management and stent removal as patient has high likelihood to continue to be lost to f/u. Agreed that it was in the best interest of the patient to remain inpatient to undergo further interventions/evaluations via urology. Pt was transitioned to bactrim -  and connie stopped. To OR with Urology on :  Left ureteroscopy, Cystoscopy,  Left ureteral biopsy,  Left JJ ureteral stent exchange,  Left retrograde pyelogram. Nurse removed stent on strings on .  Apixaban resumed.   Nephrology followed and discontinued HD while admitted.  Patient to retain HD catheter until f/u with nephrology.  Cr stable at 1.9 at discharge. Patient discharged with home health.   __________________________________________________________Today:Ms. Bowen is being evaluated at home for a transition of care visit an 11 day s/p hospitalization, see above.    With this visit today patient is found at home in stable condition, no acute issues at this time. Patient is AAOx3, able to verify her name and . Most of patients other history was received  from her medical record. He currently sees HH with [facility]; PT/OT twice per week, RN/aide once per week. He sees Dr. Siddiqi as her PCP. SHe endorses eating x 3 small meals per day. SHe endorses regular BMs.      [facility] PT is working with patient over the last few weeks as well as  to help with patient needs. Education completed ~ 15 minutes. Plan to follow up.     VSS. Denies fever, chest pain, shortness of breath, nausea, vomiting, diarrhea. Risks of environmental exposure to coronavirus discussed including: social distancing, hand hygiene, and limiting departures from the home for necessities only.  Reports understanding and willingness to comply.  All hospital discharge orders reviewed and being followed, all medications reconciled and reviewed, patient and family verbalized understanding. No other needs identified at this time.     I initiated the process of advance care planning today and explained the importance of this process to the patient and family.  I introduced the concept of advance directives to the patient, as well. Then the patient received detailed information about the importance of designating a Health Care Power of  (HCPOA). She was also instructed to communicate with this person about his wishes for future healthcare, should he become sick and lose decision-making capacity. FULL CODE STATUS    I Introduced [place] form with patient/family, explaining this is the patient's wishes, and this form will be uploaded into the patient's [facility] and the [place] Registry.     We spoke about ACP for 20 minutes.    Attestation: Screening criteria to assess the level of the patient's risk for infection with COVID-19 as recommended by the CDC at the time of the above documented home visit concluded appropriateness to proceed. Universal precautions were maintained at all times, including provider use of 60% alcohol gel hand  immediately  prior to entry and upon departing the patient's home.      Review of Systems   Constitutional: Positive for appetite change. Negative for fatigue and unexpected weight change.   HENT: Negative for congestion and sore throat.    Eyes: Negative for redness and visual disturbance.   Respiratory: Negative for shortness of breath and wheezing.    Cardiovascular: Negative for chest pain and palpitations.   Gastrointestinal: Negative for abdominal distention, nausea and vomiting.   Endocrine: Negative for polydipsia and polyuria.   Genitourinary: Negative for difficulty urinating and dysuria.   Musculoskeletal: Positive for gait problem. Negative for arthralgias.   Skin: Negative for color change and rash.   Allergic/Immunologic: Negative for food allergies.   Neurological: Positive for weakness. Negative for light-headedness and headaches.   Psychiatric/Behavioral: Negative for agitation and confusion.       Assessments:  · Environmental: Lives in single story no steps to enter  · Functional Status: Min asst with aDLs and mobility  · Safety: Fall precautions  · Nutritional: REnal diet  · Home Health/DME/Supplies: [facility]    PAST HISTORY:     Past Medical History:   Diagnosis Date    Cervicogenic migraine     Chronic pain     CKD (chronic kidney disease) stage 4, GFR 15-29 ml/min     [facility]    CKD (chronic kidney disease) stage 4, GFR 15-29 ml/min     Diabetes mellitus     Long term use of Insulin, Diabetic Neuropathy    Fibromyalgia     Hydronephrosis     Hyperlipidemia     Hypertension 12/12/2012    Hypothyroidism 12/12/2012    ARON (iron deficiency anemia)     Insomnia     Levoscoliosis     Malignant neoplasm of upper-outer quadrant of left breast in female, estrogen receptor positive     Metabolic acidosis     Mobility impaired     Nuclear sclerosis - Both Eyes 3/24/2014    VIJAYA (obstructive sleep apnea)     Osteopenia     Pulmonary nodule     Recurrent UTI     Renal manifestation of  secondary diabetes mellitus     Secondary hyperparathyroidism, renal     Urinary retention        Past Surgical History:   Procedure Laterality Date    BIOPSY OF URETER Left 2/18/2022    Procedure: BIOPSY, URETER;  Surgeon: Ronel Whittington MD;  Location: [facility] OR [facility] FLR;  Service: Urology;  Laterality: Left;    BREAST BIOPSY Right     benign    CHOLECYSTECTOMY      COLONOSCOPY N/A 1/13/2017    Procedure: COLONOSCOPY;  Surgeon: Morris Wiseman MD;  Location: [facility] ENDO (4TH FLR);  Service: Endoscopy;  Laterality: N/A;  Renal pt Nephrology advised to avoid phosphate preps    CYSTOSCOPY N/A 10/8/2018    Procedure: CYSTOSCOPY;  Surgeon: Ronel Whittington MD;  Location: [facility] OR [facility] FLR;  Service: Urology;  Laterality: N/A;  45 min    CYSTOSCOPY N/A 3/25/2019    Procedure: CYSTOSCOPY;  Surgeon: Ronel Whittington MD;  [facility];  Service: Urology;  Laterality: N/A;  45 min    CYSTOSCOPY N/A 8/26/2019    Procedure: CYSTOSCOPY;  Surgeon: Ronel Whittington MD;  [facility];  Service: Urology;  Laterality: N/A;  45 min    CYSTOSCOPY N/A 7/2/2021    Procedure: CYSTOSCOPY;  Surgeon: Ronel Whittington MD;  [facility];  Service: Urology;  Laterality: N/A;    CYSTOSCOPY  12/23/2021    Procedure: CYSTOSCOPY;  Surgeon: Ronel Whittington MD;  [facility];  Service: Urology;;    CYSTOSCOPY  2/18/2022    Procedure: CYSTOSCOPY;  Surgeon: Ronel Whittington MD;  [facility];  Service: Urology;;    CYSTOSCOPY W/ URETERAL STENT PLACEMENT Left 5/15/2021    Procedure: CYSTOSCOPY, WITH URETERAL STENT INSERTION;  Surgeon: Levy Sánchez Jr., MD;  [facility];  Service: Urology;  Laterality: Left;    CYSTOSCOPY WITH BIOPSY OF BLADDER N/A 1/27/2020    Procedure: CYSTOSCOPY, WITH BLADDER BIOPSY, WITH FULGURATION IF INDICATED;  Surgeon: Ronel Whittington MD;  Location: [facility] OR [address] [place] [address];  Service: Urology;   Laterality: N/A;    DILATION AND CURETTAGE OF UTERUS      GALLBLADDER SURGERY  2006    HYSTERECTOMY      INJECTION FOR SENTINEL NODE IDENTIFICATION Left 8/1/2019    Procedure: INJECTION, FOR SENTINEL NODE IDENTIFICATION;  Surgeon: Samia Fulton MD;  Location: [facility] OR [address] [place] [address];  Service: General;  Laterality: Left;    INJECTION OF BOTULINUM TOXIN TYPE A N/A 10/8/2018    Procedure: INJECTION, BOTULINUM TOXIN, TYPE A 300 UNITS;  Surgeon: Rnoel Whittington MD;  Location: [facility] OR [address] [place] [address];  Service: Urology;  Laterality: N/A;    INJECTION OF BOTULINUM TOXIN TYPE A N/A 3/25/2019    Procedure: INJECTION, BOTULINUM TOXIN, TYPE A 300 UNITS;  Surgeon: Ronel Whittington MD;  Location: [facility] OR [address] [place] [address];  Service: Urology;  Laterality: N/A;    INJECTION OF BOTULINUM TOXIN TYPE A N/A 8/26/2019    Procedure: INJECTION, BOTULINUM TOXIN, TYPE A 300 UNITS;  Surgeon: Ronel Whittington MD;  Location: [facility] OR [address] [place] [address];  Service: Urology;  Laterality: N/A;    MASTECTOMY Left 8/1/2019    Procedure: MASTECTOMY 23 hour stay;  Surgeon: Samia Fulton MD;  Location: [facility] OR [address] [place] [address];  Service: General;  Laterality: Left;    OVARIAN CYST SURGERY  1985    REPLACEMENT OF STENT Left 12/23/2021    Procedure: REPLACEMENT, STENT;  Surgeon: Ronel Whittington MD;  Location: [facility] OR [address] [place] [address];  Service: Urology;  Laterality: Left;    REPLACEMENT OF STENT Left 2/18/2022    Procedure: REPLACEMENT, STENT;  Surgeon: Ronel Whittington MD;  Location: [facility] OR [address] [place] [address];  Service: Urology;  Laterality: Left;    RETROGRADE PYELOGRAPHY Left 2/18/2022    Procedure: PYELOGRAM, RETROGRADE;  Surgeon: Ronel Whittington MD;  Location: [facility] OR [address] [place] [address];  Service: Urology;  Laterality: Left;    SENTINEL LYMPH NODE BIOPSY Left 8/1/2019    Procedure: BIOPSY, LYMPH NODE, SENTINEL;  Surgeon: Samia Fulton MD;  Location: [facility] OR [address] [place] [address];  Service: General;  Laterality: Left;    spt placement      TONSILLECTOMY, ADENOIDECTOMY      TOTAL  ABDOMINAL HYSTERECTOMY W/ BILATERAL SALPINGOOPHORECTOMY  1985    URETEROSCOPY Left 2/18/2022    Procedure: URETEROSCOPY;  Surgeon: Ronel Whittington MD;  Location: [facility] OR [address] [place] [address];  Service: Urology;  Laterality: Left;       Family History   Problem Relation Age of Onset    Diabetes Sister     Kidney disease Sister         CKD III    ALS Mother         d.    Cancer Maternal Grandmother         d. colon    Cancer Paternal Grandfather         d. lung    Scoliosis Brother         increased pain    Prostate cancer Brother         cured s/p surgery    Cancer Brother         rare cancer that got into the bones    Diabetes Maternal Aunt     Kidney disease Maternal Aunt     Diabetes Maternal Uncle     Amblyopia Neg Hx     Blindness Neg Hx     Cataracts Neg Hx     Glaucoma Neg Hx     Macular degeneration Neg Hx     Retinal detachment Neg Hx     Strabismus Neg Hx        Social History     Socioeconomic History    Marital status:     Number of children: 0    Highest education level: Master's degree (e.g., MA, MS, Lelo, MEd, MSW, BANDAR)   Occupational History    Occupation: teacher     Comment: retired   Tobacco Use    Smoking status: Never Smoker    Smokeless tobacco: Never Used   Substance and Sexual Activity    Alcohol use: No     Alcohol/week: 0.0 standard drinks    Drug use: No    Sexual activity: Not Currently     Birth control/protection: Abstinence   Social History Narrative    Single ()        Lives w/ sister and brother. Pt needs to keep her narcotics hidden from her brother who will steal them         Brother passed away last year.  Pt lives her her sister. (5/27)     Social Determinants of Health     Financial Resource Strain: Low Risk     Difficulty of Paying Living Expenses: Not hard at all   Food Insecurity: Food Insecurity Present    Worried About Running Out of Food in the Last Year: Never true    Ran Out of Food in the Last Year: Sometimes true   Transportation  "Needs: Unmet Transportation Needs    Lack of Transportation (Medical): Yes    Lack of Transportation (Non-Medical): Yes   Physical Activity: Inactive    Days of Exercise per Week: 0 days    Minutes of Exercise per Session: 0 min   Stress: No Stress Concern Present    Feeling of Stress : Not at all   Social Connections: Socially Isolated    Frequency of Communication with Friends and Family: More than three times a week    Frequency of Social Gatherings with Friends and Family: Never    Attends Sikh Services: Never    Active Member of Clubs or Organizations: No    Attends Club or Organization Meetings: Never    Marital Status:    Housing Stability: Low Risk     Unable to Pay for Housing in the Last Year: No    Number of Places Lived in the Last Year: 1    Unstable Housing in the Last Year: No       MEDICATIONS & ALLERGIES:     Current Outpatient Medications on File Prior to Visit   Medication Sig Dispense Refill    albuterol (PROVENTIL HFA) 90 mcg/actuation inhaler Inhale 2 puffs into the lungs every 6 (six) hours as needed for Wheezing or Shortness of Breath. Rescue (Patient not taking: Reported on 2/14/2022) 8.5 g 0    anastrozole (ARIMIDEX) 1 mg Tab TAKE 1 TABLET BY MOUTH EVERY DAY 90 tablet 2    apixaban (ELIQUIS) 2.5 mg Tab Take 2.5 mg by mouth 2 (two) times daily.      atorvastatin (LIPITOR) 80 MG tablet Take 1 tablet (80 mg total) by mouth once daily. 90 tablet 3    BD INSULIN SYRINGE ULTRA-FINE 0.5 mL 31 gauge x 5/16" Syrg USE WITH INSULIN 4 TIMES A  each 12    blood sugar diagnostic Strp ONE TOUCH ULTRA Check blood sugars 1-2 x a day 50 strip 6    blood sugar diagnostic Strp To check BG 4  times daily, to use with insurance preferred meter 400 each 3    blood-glucose meter kit To check BG 4 times daily, to use with insurance preferred meter 1 each 0    butalbital-acetaminophen-caffeine -40 mg (FIORICET, ESGIC) -40 mg per tablet TAKE 1 TABLET BY MOUTH " "WHEN NOT CONTROLLED BY OTHER MEDS. NO MORE THAN 10 TABS PER 30 DAYS (Patient taking differently: Take 1 tablet by mouth daily as needed (MIGRAINES). TAKE 1 TABLET BY MOUTH WHEN NOT CONTROLLED BY OTHER MEDS. NO MORE THAN 10 TABS PER 30 DAYS) 10 tablet 0    cloNIDine (CATAPRES) 0.2 MG tablet Take 1 tablet (0.2 mg total) by mouth 3 (three) times daily. 90 tablet 11    diclofenac sodium (VOLTAREN) 1 % Gel Apply to chest wall as needed for arthritis      famotidine (PEPCID) 20 MG tablet Take 1 tablet (20 mg total) by mouth once daily. 30 tablet 11    gemfibroziL (LOPID) 600 MG tablet Take 1 tablet (600 mg total) by mouth every Mon, Wed, Fri. 36 tablet 3    insulin (LANTUS SOLOSTAR U-100 INSULIN) glargine 100 units/mL (3mL) SubQ pen Inject 12 Units into the skin once daily.      insulin lispro 100 unit/mL injection Inject 12 Units into the skin 3 (three) times daily with meals.      lancets Misc One touch ultra, checks 1-2 x a day 50 each 6    lancets Misc To check BG 4 times daily, to use with insurance preferred meter 400 each 3    levothyroxine (SYNTHROID) 50 MCG tablet Take 1 tablet (50 mcg total) by mouth before breakfast. Administer on an empty stomach at least 30 minutes before food. If receiving tube feeds, HOLD tube feeds for 1 hour before and after levothyroxine administration. 90 tablet 2    magnesium oxide (MAG-OX) 400 mg (241.3 mg magnesium) tablet Take 1 tablet (400 mg total) by mouth once daily. 180 tablet 3    metoprolol tartrate (LOPRESSOR) 25 MG tablet Take 1 tablet (25 mg total) by mouth 2 (two) times daily. 60 tablet 11    oxybutynin (DITROPAN-XL) 10 MG 24 hr tablet TAKE 1 TABLET BY MOUTH EVERY DAY (Patient taking differently: Take 10 mg by mouth once daily.) 90 tablet 1    pen needle, diabetic (BD ULTRA-FINE SHORT PEN NEEDLE) 31 gauge x 5/16" Ndle USE WITH LANTUS DAILY, e 11.65 100 each 3    polyethylene glycol (GLYCOLAX) 17 gram PwPk One packet QD prn constipation 30 packet 6    " sumatriptan (IMITREX) 100 MG tablet TAKE 1 TABLET (100 MG TOTAL) BY MOUTH 2 (TWO) TIMES DAILY AS NEEDED FOR MIGRAINE. 9 tablet 11    torsemide (DEMADEX) 5 MG Tab Take 1 tablet (5 mg total) by mouth once daily. 30 tablet 11    traZODone (DESYREL) 100 MG tablet Take 1 tablet (100 mg total) by mouth nightly as needed for Insomnia. 90 tablet 0    venlafaxine (EFFEXOR-XR) 150 MG Cp24 Take 1 capsule (150 mg total) by mouth once daily. 30 capsule 11    [DISCONTINUED] furosemide (LASIX) 80 MG tablet Take 2 tablets (160 mg total) by mouth 2 (two) times daily. (Patient taking differently: Take 80 mg by mouth 2 (two) times a day.) 120 tablet 0    [DISCONTINUED] sevelamer carbonate (RENVELA) 800 mg Tab Take 1 tablet (800 mg total) by mouth 3 (three) times daily with meals. (Patient not taking: No sig reported) 90 tablet 11     No current facility-administered medications on file prior to visit.        Review of patient's allergies indicates:  No Known Allergies    OBJECTIVE:     Vital Signs:  Vitals:    03/03/22 1759   BP: 139/87   Pulse: 86   Resp: 18   Temp: 97.8 °F (36.6 °C)     Body mass index is 40.92 kg/m².     Physical Exam:  Physical Exam  Constitutional:       Appearance: She is obese.   HENT:      Head: Atraumatic.      Right Ear: Tympanic membrane normal.      Left Ear: Tympanic membrane normal.      Nose: Nose normal.      Mouth/Throat:      Mouth: Mucous membranes are moist.   Eyes:      Pupils: Pupils are equal, round, and reactive to light.   Cardiovascular:      Rate and Rhythm: Normal rate.      Pulses: Normal pulses.   Pulmonary:      Effort: Pulmonary effort is normal.   Abdominal:      General: Abdomen is flat. There is no distension.      Palpations: Abdomen is soft.   Musculoskeletal:         General: Normal range of motion.      Cervical back: Normal range of motion.   Skin:     General: Skin is warm and dry.      Capillary Refill: Capillary refill takes less than 2 seconds.   Neurological:       Mental Status: She is alert and oriented to person, place, and time.   Psychiatric:         Mood and Affect: Mood normal.         Laboratory  Lab Results   Component Value Date    WBC 6.63 02/24/2022    HGB 8.4 (L) 02/24/2022    HCT 25.5 (L) 02/24/2022    MCV 90 02/24/2022     02/24/2022     Lab Results   Component Value Date    INR 1.0 11/29/2021    INR 1.1 09/05/2021    INR 0.9 07/01/2021     Lab Results   Component Value Date    HGBA1C 5.4 01/03/2022     No results for input(s): POCTGLUCOSE in the last 72 hours.    Diagnostic Results:  n/a    TRANSITION OF CARE:     [facility] Contact Note: 2/28/2022    Family and/or Caretaker present at visit?  No.  Diagnostic tests reviewed/disposition: No diagnosic tests pending after this hospitalization.  Disease/illness education: Fall precautions  Home health/community services discussion/referrals: Patient has home health established at [facility].   Establishment or re-establishment of referral orders for community resources: No other necessary community resources.   Discussion with other health care providers: No discussion with other health care providers necessary.     Transition of Care Visit:     I have reviewed and updated the history and problem list.  I have reconciled the medication list.  I have discussed the hospitalization and current medical issues, prognosis and plans with the patient/family.  I  spent more than 50% of time discussing the care with the patient/family.  Total Face-to-Face Encounter: 60 minutes.    Medications Reconciliation:   I have reconciled the patient's home medications and discharge medications with the patient/family. I have updated all changes.  Refer to After-Visit Medication List.    ASSESSMENT & PLAN:     HIGH RISK CONDITION(S):  Patient has a condition that poses threat to life and bodily function    Diagnoses and all orders for this visit:     CKD (chronic kidney disease) requiring chronic dialysis  - HD MWF  -  Nephrology following  - continue sevelamer carbonate 800mg TIDW  - Renally dose all meds  - Avoid Nephrotoxic agens     Pressure injury of right buttock, stage 3  - cleanse buttocks with cleansing cloths or sea cleanse wound cleanser, then apply a thin layer of Triad ointment to affected areas BID/PRN after cleaning. Leave open to air. No cover dressing needed.  - turn patient q2h  -HH to continue to monitor     Secondary hypertension  - Continue home meds       A-fib  - continue Eliquis 2.5mg BID  - continue Lopressor 25mg BID         Malignant neoplasm of left breast, estrogen receptor positive  - continue home anastrozole 1mg daily        Suprapubic catheter  - exchanged Q month - last on 2/24  -home health changes at home     Uncontrolled type 2 diabetes mellitus with stage 4 chronic kidney disease, with long-term current use of insulin  - continued Levemir 13 units daily, Novolog 4 units TIDW, SSI       Mixed migraine and muscle contraction headache  - stable  - continue  Fioricet and sumatriptan prn     Neurogenic bladder  - suprapubic catheter   - SPC changed on 2/24   -continue monthly change with home health     Urinary retention  - continue home oxybutynin 10mg daily       Hyperlipidemia associated with type 2 diabetes mellitus  - continue Lipitor 80mg daily     Hypothyroidism  - continue home Synthroid 50mcg          Were controlled substances prescribed?  No    Instructions for the patient:    Scheduled Follow-up :  Future Appointments   Date Time Provider Department Center   3/31/2022 10:00 AM Sánchez Kearney MD [facility] IM [place]   3/31/2022  3:00 PM Rosaline Feliz MD [facility] [address]   7/12/2022  8:40 AM Tesha Stafford MD [facility] PHYSMED [address]       After Visit Medication List :     Medication List          Accurate as of March 3, 2022 11:59 PM. If you have any questions, ask your nurse or doctor.            CHANGE how you take these medications    butalbital-acetaminophen-caffeine -40 mg -40  "mg per tablet  Commonly known as: FIORICET, ESGIC  TAKE 1 TABLET BY MOUTH WHEN NOT CONTROLLED BY OTHER MEDS. NO MORE THAN 10 TABS PER 30 DAYS  What changed: See the new instructions.     methocarbamoL 750 MG Tab  Commonly known as: ROBAXIN  TAKE 1-2 TABLETS (750-1,500 MG TOTAL) BY MOUTH 3 (THREE) TIMES DAILY AS NEEDED.  What changed: how much to take        CONTINUE taking these medications    albuterol 90 mcg/actuation inhaler  Commonly known as: PROVENTIL HFA  Inhale 2 puffs into the lungs every 6 (six) hours as needed for Wheezing or Shortness of Breath. Rescue     anastrozole 1 mg Tab  Commonly known as: ARIMIDEX  TAKE 1 TABLET BY MOUTH EVERY DAY     apixaban 2.5 mg Tab  Commonly known as: ELIQUIS     atorvastatin 80 MG tablet  Commonly known as: LIPITOR  Take 1 tablet (80 mg total) by mouth once daily.     BD INSULIN SYRINGE ULTRA-FINE 0.5 mL 31 gauge x 5/16" Syrg  Generic drug: insulin syringe-needle U-100  USE WITH INSULIN 4 TIMES A DAY     * blood sugar diagnostic Strp  ONE TOUCH ULTRA Check blood sugars 1-2 x a day     * blood sugar diagnostic Strp  To check BG 4  times daily, to use with insurance preferred meter     blood-glucose meter kit  To check BG 4 times daily, to use with insurance preferred meter     cloNIDine 0.2 MG tablet  Commonly known as: CATAPRES  Take 1 tablet (0.2 mg total) by mouth 3 (three) times daily.     diclofenac sodium 1 % Gel  Commonly known as: VOLTAREN     ergocalciferol 50,000 unit Cap  Commonly known as: ERGOCALCIFEROL  TAKE 1 CAPSULE (50,000 UNITS TOTAL) BY MOUTH EVERY 7 DAYS. TAKE ONE CAPSULE BY MOUTH ONE TIME PER WEEK, TAKES ON TUESDAYS     famotidine 20 MG tablet  Commonly known as: PEPCID  Take 1 tablet (20 mg total) by mouth once daily.     gemfibroziL 600 MG tablet  Commonly known as: LOPID  Take 1 tablet (600 mg total) by mouth every Mon, Wed, Fri.     HYDROcodone-acetaminophen  mg per tablet  Commonly known as: NORCO  Take 1 tablet by mouth every 8 (eight) hours " "as needed for Pain.     insulin lispro 100 unit/mL injection     * lancets Misc  One touch ultra, checks 1-2 x a day     * lancets Misc  To check BG 4 times daily, to use with insurance preferred meter     LANTUS SOLOSTAR U-100 INSULIN glargine 100 units/mL (3mL) SubQ pen  Generic drug: insulin     levothyroxine 50 MCG tablet  Commonly known as: SYNTHROID  Take 1 tablet (50 mcg total) by mouth before breakfast. Administer on an empty stomach at least 30 minutes before food. If receiving tube feeds, HOLD tube feeds for 1 hour before and after levothyroxine administration.     lisinopriL 40 MG tablet  Commonly known as: PRINIVIL,ZESTRIL  Take 1 tablet (40 mg total) by mouth once daily.     magnesium oxide 400 mg (241.3 mg magnesium) tablet  Commonly known as: MAG-OX  Take 1 tablet (400 mg total) by mouth once daily.     metoprolol tartrate 25 MG tablet  Commonly known as: LOPRESSOR  Take 1 tablet (25 mg total) by mouth 2 (two) times daily.     NIFEdipine 60 MG (OSM) 24 hr tablet  Commonly known as: PROCARDIA-XL  Take 1 tablet (60 mg total) by mouth 2 (two) times a day. HOLD MEDICATION IF SYSTOLIC BLOOD PRESSURE (TOP NUMBER) IS LESS THAN 130.     oxybutynin 10 MG 24 hr tablet  Commonly known as: DITROPAN-XL  TAKE 1 TABLET BY MOUTH EVERY DAY     pen needle, diabetic 31 gauge x 5/16" Ndle  Commonly known as: BD ULTRA-FINE SHORT PEN NEEDLE  USE WITH LANTUS DAILY, e 11.65     polyethylene glycol 17 gram Pwpk  Commonly known as: GLYCOLAX  One packet QD prn constipation     sumatriptan 100 MG tablet  Commonly known as: IMITREX  TAKE 1 TABLET (100 MG TOTAL) BY MOUTH 2 (TWO) TIMES DAILY AS NEEDED FOR MIGRAINE.     torsemide 5 MG Tab  Commonly known as: DEMADEX  Take 1 tablet (5 mg total) by mouth once daily.     traZODone 100 MG tablet  Commonly known as: DESYREL  Take 1 tablet (100 mg total) by mouth nightly as needed for Insomnia.     venlafaxine 150 MG Cp24  Commonly known as: EFFEXOR-XR  Take 1 capsule (150 mg total) by " mouth once daily.         * This list has 4 medication(s) that are the same as other medications prescribed for you. Read the directions carefully, and ask your doctor or other care provider to review them with you.                Signature:  Susan Julio NP    Patient consent obtained prior to treatment

## 2022-03-08 ENCOUNTER — PES CALL (OUTPATIENT)
Dept: ADMINISTRATIVE | Facility: CLINIC | Age: 72
End: 2022-03-08
Payer: MEDICARE

## 2022-03-08 ENCOUNTER — PATIENT MESSAGE (OUTPATIENT)
Dept: UROLOGY | Facility: CLINIC | Age: 72
End: 2022-03-08
Payer: MEDICARE

## 2022-03-08 RX ORDER — FLUCONAZOLE 100 MG/1
100 TABLET ORAL DAILY
Qty: 5 TABLET | Refills: 0 | Status: SHIPPED | OUTPATIENT
Start: 2022-03-08 | End: 2022-03-13

## 2022-03-08 NOTE — TELEPHONE ENCOUNTER
Follow up in 2 months.   Please make sure she knows I sent a yeast pill for 5 days - some yeast was found in the kidney    Message was left on the patient's VM.    ALVARADO Peguero

## 2022-03-10 ENCOUNTER — DOCUMENT SCAN (OUTPATIENT)
Dept: HOME HEALTH SERVICES | Facility: HOSPITAL | Age: 72
End: 2022-03-10

## 2022-03-10 ENCOUNTER — DOCUMENT SCAN (OUTPATIENT)
Dept: HOME HEALTH SERVICES | Facility: HOSPITAL | Age: 72
End: 2022-03-10
Payer: MEDICARE

## 2022-03-14 ENCOUNTER — DOCUMENT SCAN (OUTPATIENT)
Dept: HOME HEALTH SERVICES | Facility: HOSPITAL | Age: 72
End: 2022-03-14
Payer: MEDICARE

## 2022-03-14 ENCOUNTER — OFFICE VISIT (OUTPATIENT)
Dept: NEPHROLOGY | Facility: CLINIC | Age: 72
End: 2022-03-14
Payer: MEDICARE

## 2022-03-14 ENCOUNTER — TELEPHONE (OUTPATIENT)
Dept: NEPHROLOGY | Facility: CLINIC | Age: 72
End: 2022-03-14
Payer: MEDICARE

## 2022-03-14 ENCOUNTER — TELEPHONE (OUTPATIENT)
Dept: INTERNAL MEDICINE | Facility: CLINIC | Age: 72
End: 2022-03-14
Payer: MEDICARE

## 2022-03-14 DIAGNOSIS — G43.909 MIXED MIGRAINE AND MUSCLE CONTRACTION HEADACHE: ICD-10-CM

## 2022-03-14 DIAGNOSIS — G47.00 INSOMNIA, UNSPECIFIED TYPE: ICD-10-CM

## 2022-03-14 DIAGNOSIS — R80.9 TYPE 2 DIABETES MELLITUS WITH PROTEINURIA: ICD-10-CM

## 2022-03-14 DIAGNOSIS — E11.69 HYPERLIPIDEMIA ASSOCIATED WITH TYPE 2 DIABETES MELLITUS: ICD-10-CM

## 2022-03-14 DIAGNOSIS — F11.20 UNCOMPLICATED OPIOID DEPENDENCE: ICD-10-CM

## 2022-03-14 DIAGNOSIS — G47.33 OSA (OBSTRUCTIVE SLEEP APNEA): ICD-10-CM

## 2022-03-14 DIAGNOSIS — E55.9 VITAMIN D DEFICIENCY DISEASE: ICD-10-CM

## 2022-03-14 DIAGNOSIS — D50.8 OTHER IRON DEFICIENCY ANEMIA: ICD-10-CM

## 2022-03-14 DIAGNOSIS — N25.81 SECONDARY HYPERPARATHYROIDISM, RENAL: ICD-10-CM

## 2022-03-14 DIAGNOSIS — N11.1 CHRONIC OBSTRUCTIVE PYELONEPHRITIS: ICD-10-CM

## 2022-03-14 DIAGNOSIS — E03.9 HYPOTHYROIDISM, UNSPECIFIED TYPE: ICD-10-CM

## 2022-03-14 DIAGNOSIS — E11.40 TYPE 2 DIABETES MELLITUS WITH DIABETIC NEUROPATHY, WITH LONG-TERM CURRENT USE OF INSULIN: ICD-10-CM

## 2022-03-14 DIAGNOSIS — E78.2 MIXED HYPERLIPIDEMIA: ICD-10-CM

## 2022-03-14 DIAGNOSIS — M89.9 CHRONIC KIDNEY DISEASE-MINERAL AND BONE DISORDER: ICD-10-CM

## 2022-03-14 DIAGNOSIS — I10 ESSENTIAL HYPERTENSION: ICD-10-CM

## 2022-03-14 DIAGNOSIS — I48.91 ATRIAL FIBRILLATION, UNSPECIFIED TYPE: Chronic | ICD-10-CM

## 2022-03-14 DIAGNOSIS — E03.9 ACQUIRED HYPOTHYROIDISM: ICD-10-CM

## 2022-03-14 DIAGNOSIS — M79.7 FIBROMYALGIA: ICD-10-CM

## 2022-03-14 DIAGNOSIS — E11.69 DIABETES MELLITUS TYPE 2 IN OBESE: ICD-10-CM

## 2022-03-14 DIAGNOSIS — N18.4 CKD (CHRONIC KIDNEY DISEASE) STAGE 4, GFR 15-29 ML/MIN: Primary | Chronic | ICD-10-CM

## 2022-03-14 DIAGNOSIS — N18.4 CKD (CHRONIC KIDNEY DISEASE) STAGE 4, GFR 15-29 ML/MIN: Primary | ICD-10-CM

## 2022-03-14 DIAGNOSIS — Z74.09 MOBILITY IMPAIRED: ICD-10-CM

## 2022-03-14 DIAGNOSIS — G44.209 MIXED MIGRAINE AND MUSCLE CONTRACTION HEADACHE: ICD-10-CM

## 2022-03-14 DIAGNOSIS — Z93.6 PRESENCE OF UROSTOMY: ICD-10-CM

## 2022-03-14 DIAGNOSIS — M85.80 OSTEOPENIA, UNSPECIFIED LOCATION: ICD-10-CM

## 2022-03-14 DIAGNOSIS — Z79.4 TYPE 2 DIABETES MELLITUS WITH DIABETIC NEUROPATHY, WITH LONG-TERM CURRENT USE OF INSULIN: ICD-10-CM

## 2022-03-14 DIAGNOSIS — E66.9 DIABETES MELLITUS TYPE 2 IN OBESE: ICD-10-CM

## 2022-03-14 DIAGNOSIS — E87.20 METABOLIC ACIDOSIS: ICD-10-CM

## 2022-03-14 DIAGNOSIS — E78.5 HYPERLIPIDEMIA ASSOCIATED WITH TYPE 2 DIABETES MELLITUS: ICD-10-CM

## 2022-03-14 DIAGNOSIS — E66.01 MORBID OBESITY DUE TO EXCESS CALORIES: ICD-10-CM

## 2022-03-14 DIAGNOSIS — E83.9 CHRONIC KIDNEY DISEASE-MINERAL AND BONE DISORDER: ICD-10-CM

## 2022-03-14 DIAGNOSIS — N20.0 NEPHROLITHIASIS: ICD-10-CM

## 2022-03-14 DIAGNOSIS — E11.29 TYPE 2 DIABETES MELLITUS WITH PROTEINURIA: ICD-10-CM

## 2022-03-14 DIAGNOSIS — N18.9 CHRONIC KIDNEY DISEASE-MINERAL AND BONE DISORDER: ICD-10-CM

## 2022-03-14 DIAGNOSIS — Z90.12 S/P LEFT MASTECTOMY: ICD-10-CM

## 2022-03-14 DIAGNOSIS — N31.9 NEUROGENIC BLADDER: ICD-10-CM

## 2022-03-14 PROCEDURE — 99443 PR PHYSICIAN TELEPHONE EVALUATION 21-30 MIN: ICD-10-PCS | Mod: 95,,, | Performed by: INTERNAL MEDICINE

## 2022-03-14 PROCEDURE — 99443 PR PHYSICIAN TELEPHONE EVALUATION 21-30 MIN: CPT | Mod: 95,,, | Performed by: INTERNAL MEDICINE

## 2022-03-14 NOTE — TELEPHONE ENCOUNTER
----- Message from Sandy Naranjo sent at 3/14/2022  1:45 PM CDT -----  Contact: [facility] [phone]  [facility] is calling for the fax they sent over for the pt for Diabetic supplies and she states they need this form filled out for the pt please advise and give return call     Fax [phone]    They faxed this on 03/07

## 2022-03-14 NOTE — TELEPHONE ENCOUNTER
Spoke to representative and notified them that we didn't receive fax from them, they will refax form to us.

## 2022-03-14 NOTE — PROGRESS NOTES
Subjective:       Patient ID: Cecile Bowen is a 71 y.o. White female who presents for follow-up evaluation of Chronic Kidney Disease    HPI     She reports she is feeling OK except her Hba1c jumped up to 12.1 from 7.5.  Her mobility remains poor and she continues to use a scooter. Her HAs are at bay. No cloudy urine. LE edema is about the same. No NSAID use, uses morphine derivatives for pain. 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 Her insurance has improved a new migraine HA medication and she is quire relieved. She uses clonidine. about 2X week for elevated BP. Most of the time she has a HA. No change in LE edema.     Interval history June 2019: She notes SOB, at rest and on exertion. She is on new treatment for migraines which has helped tremendously and her BP is much better controlled. Her Hba1c has dropped to 6.7. She is going on a cruise in the fall    Interval history September 2020: She is late to follow up due to weather and ill cancellations. She is feeling OK. Last Hba1c was 8.7. No new medications. She needs a refill on clonidine but still needs it rarely.     Interval history Feb 2021: patterns of BP clonidine daily for 5 days in a row. No obvious triggers. The lowest it can get is: . Lost 20lbs. Out of biologic for migraine (decreases the severity of HA)     Interval history Aug 2021:  The patient location is: Home  The chief complaint leading to consultation is: CKD  Visit type: audio only  Face to Face time with patient: zero  51 minutes of total time spent on the encounter, which includes face to face time and non-face to face time preparing to see the patient (eg, review of tests), Obtaining and/or reviewing separately obtained history, Documenting clinical information in the electronic or other health record, Independently interpreting results (not separately reported) and communicating results to the  patient/family/caregiver, or Care coordination (not separately reported).   Each patient to whom he or she provides medical services by telemedicine is:  (1) informed of the relationship between the physician and patient and the respective role of any other health care provider with respect to management of the patient; and (2) notified that he or she may decline to receive medical services by telemedicine and may withdraw from such care at any time.  Notes: s/p hospital stays and rehab. Pain meds with constipation, not using anything.     March 2022 Interval History   The patient location is: Home  The chief complaint leading to consultation is: CKD  Visit type: audio only  51 minutes of total time spent on the encounter, which includes face to face time and non-face to face time preparing to see the patient (eg, review of tests), Obtaining and/or reviewing separately obtained history, Documenting clinical information in the electronic or other health record, Independently interpreting results (not separately reported) and communicating results to the patient/family/caregiver, or Care coordination (not separately reported).   Each patient to whom he or she provides medical services by telemedicine is:  (1) informed of the relationship between the physician and patient and the respective role of any other health care provider with respect to management of the patient; and (2) notified that he or she may decline to receive medical services by telemedicine and may withdraw from such care at any time.  Notes: She was hospitalized several times since last visit, required HD, last session during most recent hospital stay. She is feeling better, working with OT and PT ([facility]) She notes low BPs      Review of Systems   Constitutional: Negative for activity change, appetite change, fatigue and fever.   HENT: Negative for facial swelling.    Respiratory: Positive for shortness of breath (arnold).    Cardiovascular:  Negative for leg swelling.   Gastrointestinal: Negative for constipation and diarrhea.   Genitourinary: Negative for hematuria.   Musculoskeletal: Positive for arthralgias, back pain and gait problem.   Allergic/Immunologic: Positive for immunocompromised state.   Neurological: Positive for weakness and headaches.       Objective:      Physical Exam  Pulmonary:      Effort: No respiratory distress.   Skin:     General: Skin is warm and dry.   Neurological:      Mental Status: She is alert and oriented to person, place, and time. Mental status is at baseline.   Psychiatric:         Mood and Affect: Mood normal.         Behavior: Behavior normal.         Thought Content: Thought content normal.         Judgment: Judgment normal.         Assessment:       1. CKD (chronic kidney disease) stage 4, GFR 15-29 ml/min    2. Metabolic acidosis    3. Vitamin D deficiency disease    4. Secondary hyperparathyroidism, renal    5. Type 2 diabetes mellitus with proteinuria    6. Type 2 diabetes mellitus with diabetic neuropathy, with long-term current use of insulin    7. Morbid obesity due to excess calories    8. VIJAYA (obstructive sleep apnea)    9. Fibromyalgia    10. Other iron deficiency anemia    11. Uncontrolled type 2 diabetes mellitus with chronic kidney disease, with long-term current use of insulin    12. Diabetes mellitus type 2 in obese    13. Neurogenic bladder    14. Hypothyroidism, unspecified type    15. Osteopenia, unspecified location    16. Acquired hypothyroidism    17. S/P left mastectomy    18. Essential hypertension    19. Insomnia, unspecified type    20. Presence of urostomy    21. Mixed migraine and muscle contraction headache    22. Mixed hyperlipidemia    23. Hyperlipidemia associated with type 2 diabetes mellitus    24. Mobility impaired    25. Nephrolithiasis    26. Chronic obstructive pyelonephritis    27. Chronic kidney disease-mineral and bone disorder    28. Atrial fibrillation, unspecified type   "  29. Uncomplicated opioid dependence        Plan:           CKD Stage 4 with ZAK requiring hemodialysis now recovered to Stage 4 - check labs     Mineral and Bone Disease--Continue D3. Check PTH    HTN/BP--stop lisinopril and CCB due to hypotension     Suprapubic catheter status--continue Urology follow up    DM--per PCP        Check labs and med list via   "[facility]"     "

## 2022-03-15 ENCOUNTER — OFFICE VISIT (OUTPATIENT)
Dept: HOME HEALTH SERVICES | Facility: CLINIC | Age: 72
End: 2022-03-15
Payer: MEDICARE

## 2022-03-15 VITALS — BODY MASS INDEX: 40.93 KG/M2 | HEIGHT: 63 IN | HEART RATE: 81 BPM | WEIGHT: 231 LBS | OXYGEN SATURATION: 97 %

## 2022-03-15 DIAGNOSIS — F11.20 UNCOMPLICATED OPIOID DEPENDENCE: ICD-10-CM

## 2022-03-15 DIAGNOSIS — E83.9 CHRONIC KIDNEY DISEASE-MINERAL AND BONE DISORDER: ICD-10-CM

## 2022-03-15 DIAGNOSIS — E66.9 DIABETES MELLITUS TYPE 2 IN OBESE: ICD-10-CM

## 2022-03-15 DIAGNOSIS — N11.1 CHRONIC OBSTRUCTIVE PYELONEPHRITIS: ICD-10-CM

## 2022-03-15 DIAGNOSIS — Z00.00 ENCOUNTER FOR PREVENTIVE HEALTH EXAMINATION: Primary | ICD-10-CM

## 2022-03-15 DIAGNOSIS — F33.1 MAJOR DEPRESSIVE DISORDER, RECURRENT EPISODE, MODERATE: Chronic | ICD-10-CM

## 2022-03-15 DIAGNOSIS — Z90.12 S/P LEFT MASTECTOMY: Chronic | ICD-10-CM

## 2022-03-15 DIAGNOSIS — G89.29 OTHER CHRONIC PAIN: Chronic | ICD-10-CM

## 2022-03-15 DIAGNOSIS — Z93.59 SUPRAPUBIC CATHETER: Chronic | ICD-10-CM

## 2022-03-15 DIAGNOSIS — I15.9 SECONDARY HYPERTENSION: ICD-10-CM

## 2022-03-15 DIAGNOSIS — E87.1 HYPONATREMIA: Chronic | ICD-10-CM

## 2022-03-15 DIAGNOSIS — D63.8 ANEMIA OF CHRONIC DISEASE: ICD-10-CM

## 2022-03-15 DIAGNOSIS — G43.719 INTRACTABLE CHRONIC MIGRAINE WITHOUT AURA AND WITHOUT STATUS MIGRAINOSUS: ICD-10-CM

## 2022-03-15 DIAGNOSIS — R91.1 PULMONARY NODULE: ICD-10-CM

## 2022-03-15 DIAGNOSIS — N25.81 SECONDARY HYPERPARATHYROIDISM, RENAL: ICD-10-CM

## 2022-03-15 DIAGNOSIS — E78.5 HYPERLIPIDEMIA ASSOCIATED WITH TYPE 2 DIABETES MELLITUS: Chronic | ICD-10-CM

## 2022-03-15 DIAGNOSIS — N18.4 CKD (CHRONIC KIDNEY DISEASE) STAGE 4, GFR 15-29 ML/MIN: Chronic | ICD-10-CM

## 2022-03-15 DIAGNOSIS — E11.69 HYPERLIPIDEMIA ASSOCIATED WITH TYPE 2 DIABETES MELLITUS: Chronic | ICD-10-CM

## 2022-03-15 DIAGNOSIS — G43.809 CERVICOGENIC MIGRAINE: Chronic | ICD-10-CM

## 2022-03-15 DIAGNOSIS — M89.9 CHRONIC KIDNEY DISEASE-MINERAL AND BONE DISORDER: ICD-10-CM

## 2022-03-15 DIAGNOSIS — E66.01 MORBID OBESITY DUE TO EXCESS CALORIES: Chronic | ICD-10-CM

## 2022-03-15 DIAGNOSIS — N31.9 NEUROGENIC BLADDER: Chronic | ICD-10-CM

## 2022-03-15 DIAGNOSIS — N18.9 CHRONIC KIDNEY DISEASE-MINERAL AND BONE DISORDER: ICD-10-CM

## 2022-03-15 DIAGNOSIS — I48.91 ATRIAL FIBRILLATION, UNSPECIFIED TYPE: Chronic | ICD-10-CM

## 2022-03-15 DIAGNOSIS — Z99.3 DEPENDENCE ON WHEELCHAIR: ICD-10-CM

## 2022-03-15 DIAGNOSIS — E11.69 DIABETES MELLITUS TYPE 2 IN OBESE: ICD-10-CM

## 2022-03-15 PROCEDURE — 3008F PR BODY MASS INDEX (BMI) DOCUMENTED: ICD-10-PCS | Mod: CPTII,S$GLB,, | Performed by: NURSE PRACTITIONER

## 2022-03-15 PROCEDURE — 3288F FALL RISK ASSESSMENT DOCD: CPT | Mod: CPTII,S$GLB,, | Performed by: NURSE PRACTITIONER

## 2022-03-15 PROCEDURE — 99499 RISK ADDL DX/OHS AUDIT: ICD-10-PCS | Mod: S$GLB,,, | Performed by: NURSE PRACTITIONER

## 2022-03-15 PROCEDURE — 1101F PT FALLS ASSESS-DOCD LE1/YR: CPT | Mod: CPTII,S$GLB,, | Performed by: NURSE PRACTITIONER

## 2022-03-15 PROCEDURE — 3288F PR FALLS RISK ASSESSMENT DOCUMENTED: ICD-10-PCS | Mod: CPTII,S$GLB,, | Performed by: NURSE PRACTITIONER

## 2022-03-15 PROCEDURE — 4010F PR ACE/ARB THEARPY RXD/TAKEN: ICD-10-PCS | Mod: CPTII,S$GLB,, | Performed by: NURSE PRACTITIONER

## 2022-03-15 PROCEDURE — G9919 PR SCREENING AND POSITIVE: ICD-10-PCS | Mod: CPTII,S$GLB,, | Performed by: NURSE PRACTITIONER

## 2022-03-15 PROCEDURE — 3044F PR MOST RECENT HEMOGLOBIN A1C LEVEL <7.0%: ICD-10-PCS | Mod: CPTII,S$GLB,, | Performed by: NURSE PRACTITIONER

## 2022-03-15 PROCEDURE — 3044F HG A1C LEVEL LT 7.0%: CPT | Mod: CPTII,S$GLB,, | Performed by: NURSE PRACTITIONER

## 2022-03-15 PROCEDURE — 4010F ACE/ARB THERAPY RXD/TAKEN: CPT | Mod: CPTII,S$GLB,, | Performed by: NURSE PRACTITIONER

## 2022-03-15 PROCEDURE — 3066F PR DOCUMENTATION OF TREATMENT FOR NEPHROPATHY: ICD-10-PCS | Mod: CPTII,S$GLB,, | Performed by: NURSE PRACTITIONER

## 2022-03-15 PROCEDURE — 3066F NEPHROPATHY DOC TX: CPT | Mod: CPTII,S$GLB,, | Performed by: NURSE PRACTITIONER

## 2022-03-15 PROCEDURE — G9919 SCRN ND POS ND PROV OF REC: HCPCS | Mod: CPTII,S$GLB,, | Performed by: NURSE PRACTITIONER

## 2022-03-15 PROCEDURE — 3008F BODY MASS INDEX DOCD: CPT | Mod: CPTII,S$GLB,, | Performed by: NURSE PRACTITIONER

## 2022-03-15 PROCEDURE — 99499 UNLISTED E&M SERVICE: CPT | Mod: S$GLB,,, | Performed by: NURSE PRACTITIONER

## 2022-03-15 PROCEDURE — G0439 PPPS, SUBSEQ VISIT: HCPCS | Mod: S$GLB,,, | Performed by: NURSE PRACTITIONER

## 2022-03-15 PROCEDURE — G0439 PR MEDICARE ANNUAL WELLNESS SUBSEQUENT VISIT: ICD-10-PCS | Mod: S$GLB,,, | Performed by: NURSE PRACTITIONER

## 2022-03-15 PROCEDURE — 1101F PR PT FALLS ASSESS DOC 0-1 FALLS W/OUT INJ PAST YR: ICD-10-PCS | Mod: CPTII,S$GLB,, | Performed by: NURSE PRACTITIONER

## 2022-03-15 RX ORDER — FLUCONAZOLE 100 MG/1
100 TABLET ORAL DAILY
COMMUNITY
End: 2022-07-05 | Stop reason: ALTCHOICE

## 2022-03-15 NOTE — TELEPHONE ENCOUNTER
"In addition to obtaining recent lab results from "[facility]"  More labs via HH in 2 weeks please, orders in. ty  "

## 2022-03-15 NOTE — TELEPHONE ENCOUNTER
[facility] called and requested recent lab results to be faxed to us.  Also lab orders faxed to them for draw week of 3/28/22.

## 2022-03-16 ENCOUNTER — PATIENT MESSAGE (OUTPATIENT)
Dept: ADMINISTRATIVE | Facility: HOSPITAL | Age: 72
End: 2022-03-16
Payer: MEDICARE

## 2022-03-16 PROBLEM — N18.6 CKD (CHRONIC KIDNEY DISEASE) REQUIRING CHRONIC DIALYSIS: Status: RESOLVED | Noted: 2022-02-11 | Resolved: 2022-03-16

## 2022-03-16 PROBLEM — Z85.3 PERSONAL HISTORY OF MALIGNANT NEOPLASM OF BREAST: Status: ACTIVE | Noted: 2021-06-05

## 2022-03-16 PROBLEM — F11.20 UNCOMPLICATED OPIOID DEPENDENCE: Status: ACTIVE | Noted: 2022-03-16

## 2022-03-16 PROBLEM — Z99.2 CKD (CHRONIC KIDNEY DISEASE) REQUIRING CHRONIC DIALYSIS: Status: RESOLVED | Noted: 2022-02-11 | Resolved: 2022-03-16

## 2022-03-16 PROBLEM — Z91.81 HISTORY OF FALLING: Status: ACTIVE | Noted: 2021-06-05

## 2022-03-16 PROBLEM — Z79.4 LONG TERM CURRENT USE OF INSULIN: Status: ACTIVE | Noted: 2021-06-05

## 2022-03-17 VITALS
BODY MASS INDEX: 40.93 KG/M2 | RESPIRATION RATE: 18 BRPM | DIASTOLIC BLOOD PRESSURE: 87 MMHG | HEART RATE: 86 BPM | WEIGHT: 231 LBS | HEIGHT: 63 IN | SYSTOLIC BLOOD PRESSURE: 139 MMHG | TEMPERATURE: 98 F | OXYGEN SATURATION: 98 %

## 2022-03-17 NOTE — PROGRESS NOTES
"  Cecile Bowen presented for a  Medicare AWV and comprehensive Health Risk Assessment today. The following components were reviewed and updated:    · Medical history  · Family History  · Social history  · Allergies and Current Medications  · Health Risk Assessment  · Health Maintenance  · Care Team     Patient screened moderate and/or high risk for one or more social determinants of health (SDOH). Patient connected to community resources through the ED Navigator.      ** See Completed Assessments for Annual Wellness Visit within the encounter summary.**         The following assessments were completed:  · Living Situation  · CAGE  · Depression Screening  · Timed Get Up and Go  · Whisper Test  · Cognitive Function Screening  · Nutrition Screening  · ADL Screening  · PAQ Screening        Vitals:    03/15/22 0916   Pulse: 81   SpO2: 97%   Weight: 104.8 kg (231 lb)   Height: 5' 3" (1.6 m)     Body mass index is 40.92 kg/m².  Physical Exam  Constitutional:       Appearance: Normal appearance.   HENT:      Head: Normocephalic and atraumatic.      Nose: Nose normal.   Eyes:      Extraocular Movements: Extraocular movements intact.      Pupils: Pupils are equal, round, and reactive to light.   Cardiovascular:      Rate and Rhythm: Rhythm irregularly irregular.      Pulses: Normal pulses.      Heart sounds: Normal heart sounds.   Pulmonary:      Effort: Pulmonary effort is normal.      Breath sounds: Normal breath sounds.   Abdominal:      Tenderness: There is no abdominal tenderness.      Comments: Suprapubic catheter   Musculoskeletal:      Cervical back: Normal range of motion and neck supple.   Neurological:      Mental Status: She is alert.               Diagnoses and health risks identified today and associated recommendations/orders:    1. Encounter for preventive health examination  AWV completed      2. Secondary hyperparathyroidism, renal  Chronic and stable. Continue current treatment. Follow with PCP.      3. " Spoke with Ben from Dr. Mc's office. Informed ben that we do not have openings to schedule patient for at least 2 weeks. Ben is going to talk with ANGELY Bah.   Dependence on wheelchair  Chronic and stable. Continue current treatment. Follow with PCP.      4. Uncomplicated opioid dependence  Chronic and stable. Continue current treatment. Follow with PCP.      5. CKD (chronic kidney disease) stage 4, GFR 15-29 ml/min  Chronic and stable. Continue current treatment. Follow with PCP    6. Other chronic pain  Chronic and stable. Continue current treatment. Follow with PCP.  On pain meds     7. Intractable chronic migraine without aura and without status migrainosus  Chronic and stable. Continue current treatment. Follow with PCP.      8. Cervicogenic migraine  Chronic and stable. Continue current treatment. Follow with PCP.      9. Major depressive disorder, recurrent episode, moderate  Chronic and stable. Continue current treatment. Follow with PCP.      10. Pulmonary nodule  Chronic and stable. Continue current treatment. Follow with PCP.  Followed with radiology    11. Atrial fibrillation, unspecified type  Chronic and stable. Continue current treatment. Follow with PCP.      12. Hyperlipidemia associated with type 2 diabetes mellitus  Chronic and stable. Continue current treatment. Follow with PCP.      13. Secondary hypertension  Chronic and stable. Continue current treatment. Follow with PCP.      14. Chronic kidney disease-mineral and bone disorder  Chronic and stable. Continue current treatment. Follow with PCP.      15. Hyponatremia  Chronic and stable. Continue current treatment. Follow with PCP.      16. Chronic obstructive pyelonephritis  Chronic and stable. Continue current treatment. Follow with PCP.  Followed with urology    17. Neurogenic bladder  Chronic and stable. Continue current treatment. Follow with PCP.  Has catheter    18. S/P left mastectomy  Chronic and stable. Continue current treatment. Follow with PCP.      19. Suprapubic catheter  Chronic and stable. Continue current treatment. Follow with PCP.      20. Anemia of chronic disease  Chronic and  stable. Continue current treatment. Follow with PCP.      21. Diabetes mellitus type 2 in obese  Chronic and stable. Continue current treatment. Follow with PCP.      22. Morbid obesity due to excess calories  Chronic and stable. Continue current treatment. Follow with PCP.          Provided Cecile with a 5-10 year written screening schedule and personal prevention plan. Recommendations were developed using the USPSTF age appropriate recommendations. Education, counseling, and referrals were provided as needed. After Visit Summary printed and given to patient which includes a list of additional screenings\tests needed.    Fu in 1 yr for ARELI Jorge NP  I offered to discuss advanced care planning, including how to pick a person who would make decisions for you if you were unable to make them for yourself, called a health care power of , and what kind of decisions you might make such as use of life sustaining treatments such as ventilators and tube feeding when faced with a life limiting illness recorded on a living will that they will need to know. (How you want to be cared for as you near the end of your natural life)     X Patient is interested in learning more about how to make advanced directives.  I provided them paperwork and offered to discuss this with them.

## 2022-03-17 NOTE — PATIENT INSTRUCTIONS
Instructions:  - [facility] Nurse Practitioner to schedule home follow-up visit with patient as needed.  - Continue all medications, treatments and therapies as ordered.   - Follow all instructions, recommendations as discussed.  - Maintain Safety Precautions at all times.  - Attend all medical appointments as scheduled.  - For worsening symptoms: call Primary Care Physician or Nurse Practitioner.  - For emergencies, call 911 or immediately report to the nearest emergency room.  - Limit Risks of environmental exposure to coronavirus/COVID-19 as discussed including: social distancing, hand hygiene, and limiting departures from the home for necessities only.

## 2022-03-17 NOTE — PATIENT INSTRUCTIONS
Counseling and Referral of Other Preventative  (Italic type indicates deductible and co-insurance are waived)    Patient Name: Cecile Bowen  Today's Date: 3/16/2022    Health Maintenance       Date Due Completion Date    TETANUS VACCINE Never done ---    Shingles Vaccine (2 of 3) 02/10/2014 12/16/2013    Mammogram 07/08/2020 7/8/2019    Foot Exam 10/11/2020 10/11/2019 (Done)    Override on 10/11/2019: Done    Override on 9/27/2017: Done    Override on 10/6/2016: Done    Override on 9/19/2016: Done    Eye Exam 10/28/2020 10/28/2019    Influenza Vaccine (1) 09/01/2021 9/25/2019    Pneumococcal Vaccines (Age 65+) (3 of 4 - PPSV23) 09/19/2021 9/27/2017    Colorectal Cancer Screening 01/13/2022 1/13/2017    Hemoglobin A1c 07/03/2022 1/3/2022    DEXA Scan 10/29/2022 10/29/2019    Lipid Panel 02/09/2023 2/9/2022        No orders of the defined types were placed in this encounter.    The following information is provided to all patients.  This information is to help you find resources for any of the problems found today that may be affecting your health:                Living healthy guide: www.[facility].[place].gov      Understanding Diabetes: www.diabetes.org      Eating healthy: www.cdc.gov/healthyweight      CDC home safety checklist: www.cdc.gov/steadi/patient.html      Agency on Aging: www.goea.[place].[facility]      Alcoholics anonymous (AA): www.aa.org      Physical Activity: www.zenon.nih.gov/uk4cbld      Tobacco use: www.quitwithusla.org

## 2022-03-18 ENCOUNTER — TELEPHONE (OUTPATIENT)
Dept: INTERNAL MEDICINE | Facility: CLINIC | Age: 72
End: 2022-03-18
Payer: MEDICARE

## 2022-03-18 NOTE — TELEPHONE ENCOUNTER
Spoke to Ki with [facility]. He was requesting that the PT be extended with HH for another few weeks.  I advised that we can give the verbal ok on this, however the pt has not been seen in over a year.  He stated that original orders were written by hospitalist. Ok for Dr. Cancino to sign.

## 2022-03-18 NOTE — TELEPHONE ENCOUNTER
----- Message from Wing Portillo sent at 3/18/2022  3:37 PM CDT -----  Contact: Ki [phone]  Ki from [facility] requesting orders for pt to continue.    Please call and advise

## 2022-03-20 PROCEDURE — G0179 MD RECERTIFICATION HHA PT: HCPCS | Mod: ,,, | Performed by: INTERNAL MEDICINE

## 2022-03-20 PROCEDURE — G0179 PR HOME HEALTH MD RECERTIFICATION: ICD-10-PCS | Mod: ,,, | Performed by: INTERNAL MEDICINE

## 2022-03-21 ENCOUNTER — TELEPHONE (OUTPATIENT)
Dept: INTERNAL MEDICINE | Facility: CLINIC | Age: 72
End: 2022-03-21
Payer: MEDICARE

## 2022-03-21 PROBLEM — Z99.2 DIALYSIS PATIENT: Chronic | Status: RESOLVED | Noted: 2022-01-21 | Resolved: 2022-03-21

## 2022-03-21 NOTE — TELEPHONE ENCOUNTER
----- Message from Qing Sena sent at 3/21/2022 12:49 PM CDT -----  Contact: [phone]   Michelle from [facility] is calling for the fax they sent over for the pt for Diabetic supplies and she states they need this form filled out for the pt please advise and give return call. Fax [phone]

## 2022-03-23 ENCOUNTER — TELEPHONE (OUTPATIENT)
Dept: INTERNAL MEDICINE | Facility: CLINIC | Age: 72
End: 2022-03-23
Payer: MEDICARE

## 2022-03-23 ENCOUNTER — TELEPHONE (OUTPATIENT)
Dept: HEPATOLOGY | Facility: CLINIC | Age: 72
End: 2022-03-23
Payer: MEDICARE

## 2022-03-23 NOTE — TELEPHONE ENCOUNTER
Called patient to schedule follow up appointment for necessary paperwork, patient family member stated she was on another call so she will tell her to call us back

## 2022-03-23 NOTE — TELEPHONE ENCOUNTER
----- Message from Wing Portillo sent at 3/23/2022  2:37 PM CDT -----  Contact: self [phone]  Patient is returning a phone call.  Who left a message for the patient: Calista Shane MA  Does patient know what this is regarding:  schedule  Would you like a call back, or a response through your [facility] portal?:   call back  Comments:       Please call and advise

## 2022-03-23 NOTE — TELEPHONE ENCOUNTER
----- Message from Qing Sena sent at 3/22/2022  2:16 PM CDT -----  Contact: [phone]  RON Espinoza,  called to speak to Dr Ruiz in reference to this pt. Pt is having nausea and vomiting for the past three days with nothing to take for it. Please Advise

## 2022-03-23 NOTE — TELEPHONE ENCOUNTER
Returned call with no answer.  Patient discharged from hospital on 2/25/2022.  Follow up must be with PCP for further issues.

## 2022-03-24 ENCOUNTER — DOCUMENT SCAN (OUTPATIENT)
Dept: HOME HEALTH SERVICES | Facility: HOSPITAL | Age: 72
End: 2022-03-24
Payer: MEDICARE

## 2022-03-25 ENCOUNTER — TELEPHONE (OUTPATIENT)
Dept: INTERNAL MEDICINE | Facility: CLINIC | Age: 72
End: 2022-03-25
Payer: MEDICARE

## 2022-03-25 NOTE — TELEPHONE ENCOUNTER
Spoke to representative and notified them that patient has appt Monday and form will be faxed after that

## 2022-03-28 ENCOUNTER — TELEPHONE (OUTPATIENT)
Dept: INTERNAL MEDICINE | Facility: CLINIC | Age: 72
End: 2022-03-28

## 2022-03-28 ENCOUNTER — OFFICE VISIT (OUTPATIENT)
Dept: INTERNAL MEDICINE | Facility: CLINIC | Age: 72
End: 2022-03-28
Payer: MEDICARE

## 2022-03-28 DIAGNOSIS — N31.9 NEUROGENIC BLADDER: Chronic | ICD-10-CM

## 2022-03-28 DIAGNOSIS — F33.1 MAJOR DEPRESSIVE DISORDER, RECURRENT EPISODE, MODERATE: Chronic | ICD-10-CM

## 2022-03-28 DIAGNOSIS — R80.9 NEPHROTIC RANGE PROTEINURIA: ICD-10-CM

## 2022-03-28 DIAGNOSIS — G47.33 OSA (OBSTRUCTIVE SLEEP APNEA): Chronic | ICD-10-CM

## 2022-03-28 DIAGNOSIS — I15.9 SECONDARY HYPERTENSION: ICD-10-CM

## 2022-03-28 DIAGNOSIS — N18.4 CKD (CHRONIC KIDNEY DISEASE) STAGE 4, GFR 15-29 ML/MIN: Chronic | ICD-10-CM

## 2022-03-28 DIAGNOSIS — E66.9 DIABETES MELLITUS TYPE 2 IN OBESE: ICD-10-CM

## 2022-03-28 DIAGNOSIS — E11.69 HYPERLIPIDEMIA ASSOCIATED WITH TYPE 2 DIABETES MELLITUS: Chronic | ICD-10-CM

## 2022-03-28 DIAGNOSIS — Z74.1 REQUIRES DAILY ASSISTANCE FOR ACTIVITIES OF DAILY LIVING (ADL) AND COMFORT NEEDS: Chronic | ICD-10-CM

## 2022-03-28 DIAGNOSIS — E11.69 DIABETES MELLITUS TYPE 2 IN OBESE: ICD-10-CM

## 2022-03-28 DIAGNOSIS — Z12.31 ENCOUNTER FOR SCREENING MAMMOGRAM FOR MALIGNANT NEOPLASM OF BREAST: ICD-10-CM

## 2022-03-28 DIAGNOSIS — E11.40 TYPE 2 DIABETES MELLITUS WITH DIABETIC NEUROPATHY, WITH LONG-TERM CURRENT USE OF INSULIN: Primary | ICD-10-CM

## 2022-03-28 DIAGNOSIS — Z79.4 TYPE 2 DIABETES MELLITUS WITH DIABETIC NEUROPATHY, WITH LONG-TERM CURRENT USE OF INSULIN: Primary | ICD-10-CM

## 2022-03-28 DIAGNOSIS — E78.5 HYPERLIPIDEMIA ASSOCIATED WITH TYPE 2 DIABETES MELLITUS: Chronic | ICD-10-CM

## 2022-03-28 DIAGNOSIS — E03.9 ACQUIRED HYPOTHYROIDISM: Chronic | ICD-10-CM

## 2022-03-28 PROCEDURE — 3044F HG A1C LEVEL LT 7.0%: CPT | Mod: CPTII,95,, | Performed by: NURSE PRACTITIONER

## 2022-03-28 PROCEDURE — 1159F PR MEDICATION LIST DOCUMENTED IN MEDICAL RECORD: ICD-10-PCS | Mod: CPTII,95,, | Performed by: NURSE PRACTITIONER

## 2022-03-28 PROCEDURE — 99442 PR PHYSICIAN TELEPHONE EVALUATION 11-20 MIN: CPT | Mod: 95,,, | Performed by: NURSE PRACTITIONER

## 2022-03-28 PROCEDURE — 4010F PR ACE/ARB THEARPY RXD/TAKEN: ICD-10-PCS | Mod: CPTII,95,, | Performed by: NURSE PRACTITIONER

## 2022-03-28 PROCEDURE — 1159F MED LIST DOCD IN RCRD: CPT | Mod: CPTII,95,, | Performed by: NURSE PRACTITIONER

## 2022-03-28 PROCEDURE — 3044F PR MOST RECENT HEMOGLOBIN A1C LEVEL <7.0%: ICD-10-PCS | Mod: CPTII,95,, | Performed by: NURSE PRACTITIONER

## 2022-03-28 PROCEDURE — 99442 PR PHYSICIAN TELEPHONE EVALUATION 11-20 MIN: ICD-10-PCS | Mod: 95,,, | Performed by: NURSE PRACTITIONER

## 2022-03-28 PROCEDURE — 4010F ACE/ARB THERAPY RXD/TAKEN: CPT | Mod: CPTII,95,, | Performed by: NURSE PRACTITIONER

## 2022-03-28 PROCEDURE — 3066F NEPHROPATHY DOC TX: CPT | Mod: CPTII,95,, | Performed by: NURSE PRACTITIONER

## 2022-03-28 PROCEDURE — 1160F PR REVIEW ALL MEDS BY PRESCRIBER/CLIN PHARMACIST DOCUMENTED: ICD-10-PCS | Mod: CPTII,95,, | Performed by: NURSE PRACTITIONER

## 2022-03-28 PROCEDURE — 1160F RVW MEDS BY RX/DR IN RCRD: CPT | Mod: CPTII,95,, | Performed by: NURSE PRACTITIONER

## 2022-03-28 PROCEDURE — 3066F PR DOCUMENTATION OF TREATMENT FOR NEPHROPATHY: ICD-10-PCS | Mod: CPTII,95,, | Performed by: NURSE PRACTITIONER

## 2022-03-28 NOTE — PROGRESS NOTES
Established Patient - Audio Only Telehealth Visit     The patient location is: home   The chief complaint leading to consultation is: type 2 dm   Visit type: Virtual visit with audio only (telephone)  Total time spent with patient:  15, 20    Rehab, hospitalizations, HD oct-feb 2022.   Off dialysis currently  BG today 211 mg/dl   bg 103-180s   Highest 290 mg/dl   CR 1.9-2.1   egfr ~30  [facility] medical supplier    Lab Results   Component Value Date    HGBA1C 5.4 01/03/2022       The reason for the audio only service rather than synchronous audio and video virtual visit was related to technical difficulties or patient preference/necessity.     Each patient to whom I provide medical services by telemedicine is:  (1) informed of the relationship between the physician and patient and the respective role of any other health care provider with respect to management of the patient; and (2) notified that they may decline to receive medical services by telemedicine and may withdraw from such care at any time. Patient verbally consented to receive this service via voice-only telephone call.       HPI: Type 2 DM  On insulin  On MDI injections 4 x a day  Testing 4 x a day  Patient is willing and able to use the device  Demonstrated an understanding of the technology and is motivated to use CGM  Patient expected to adhere to a comprehensive diabetes treatment plan and patient has adequate medical supervision  Patient experiences multiple impaired awareness of hypoglycemia (hypoglycemia unawareness)         Assessment and plan:    1. Type 2 diabetes mellitus with diabetic neuropathy, with long-term current use of insulin  Hemoglobin A1C   2. Diabetes mellitus type 2 in obese     3. CKD (chronic kidney disease) stage 4, GFR 15-29 ml/min     4. Secondary hypertension     5. Requires daily assistance for activities of daily living (ADL) and comfort needs     6. VIJAYA (obstructive sleep apnea)     7. Nephrotic range proteinuria     8.  Neurogenic bladder     9. Major depressive disorder, recurrent episode, moderate     10. Hyperlipidemia associated with type 2 diabetes mellitus     11. Encounter for screening mammogram for malignant neoplasm of breast  Mammo Digital Screening Bilat   12. Acquired hypothyroidism          1-2. Follow up in 4 months  Continue lantus 12 units daily  humalog correction scale > 180  Self adjustment per scale   Xiomara 1 - orders/chart note needed with [facility]   3. Improving  Off hd  4. F/u with nephrology   On med(s)  5. HH, rehab recently   6. Stable  7. See above   8. See above   9. Managed per pcp   10.   Lab Results   Component Value Date    LDLCALC 94 02/09/2022     11. Mammogram 2022    12.   Lab Results   Component Value Date    TSH 2.015 11/29/2021     Stable  Managed per pcp      This service was not originating from a related E/M service provided within the previous 7 days nor will  to an E/M service or procedure within the next 24 hours or my soonest available appointment.  Prevailing standard of care was able to be met in this audio-only visit.

## 2022-03-28 NOTE — TELEPHONE ENCOUNTER
----- Message from DIANN Velez, FNP sent at 3/28/2022  4:33 PM CDT -----  Regarding: f/u  Audio visit -4 mos Harvey  A1c prior   Mammogram needed

## 2022-03-28 NOTE — PATIENT INSTRUCTIONS
Follow up in 4 months w/Irielle -audio  A1c prior (1-7 days before appt)     Continue lantus 12 units daily  Humalog correction scale if sugar is >180   180-230+2, etc    Xiomara 1-- [facility] medical     Lab Results   Component Value Date    HGBA1C 5.4 01/03/2022     Goal less than 7%   Watch anemia with a1c levels     [email or web address]  Eat fit tawana  [employer]  [email or web address]

## 2022-03-29 ENCOUNTER — PATIENT OUTREACH (OUTPATIENT)
Dept: ADMINISTRATIVE | Facility: OTHER | Age: 72
End: 2022-03-29
Payer: MEDICARE

## 2022-03-30 RX ORDER — BUTALBITAL, ACETAMINOPHEN AND CAFFEINE 50; 325; 40 MG/1; MG/1; MG/1
TABLET ORAL
Qty: 10 TABLET | Refills: 0 | OUTPATIENT
Start: 2022-03-30

## 2022-03-30 NOTE — TELEPHONE ENCOUNTER
----- Message from Susi Mejia sent at 3/30/2022  2:08 PM CDT -----  Contact: Self/[phone]  Requesting an RX refill or new RX.  Is this a refill or new RX: New  RX name and strength:  butalbital-acetaminophen-caffeine -40 mg (FIORICET, ESGIC) -40 mg per tablet  Is this a 30 day or 90 day RX:   [facility]/pharmacy #[id number] - [place] - [address]  [address]  [place] [address]  Phone: [phone] Fax: [phone]        The doctors have asked that we provide their patients with the following 2 reminders -- prescription refills can take up to 72 hours, and a friendly reminder that in the future you can use your [facility] account to request refills:       Pt stated that she is out of her medication

## 2022-03-31 ENCOUNTER — OFFICE VISIT (OUTPATIENT)
Dept: INTERNAL MEDICINE | Facility: CLINIC | Age: 72
End: 2022-03-31
Payer: MEDICARE

## 2022-03-31 VITALS
HEART RATE: 130 BPM | OXYGEN SATURATION: 96 % | BODY MASS INDEX: 40.92 KG/M2 | HEIGHT: 63 IN | DIASTOLIC BLOOD PRESSURE: 80 MMHG | TEMPERATURE: 98 F | SYSTOLIC BLOOD PRESSURE: 134 MMHG

## 2022-03-31 DIAGNOSIS — Z09 HOSPITAL DISCHARGE FOLLOW-UP: Primary | ICD-10-CM

## 2022-03-31 DIAGNOSIS — R82.90 CLOUDY URINE: ICD-10-CM

## 2022-03-31 DIAGNOSIS — R11.2 NAUSEA AND VOMITING, INTRACTABILITY OF VOMITING NOT SPECIFIED, UNSPECIFIED VOMITING TYPE: ICD-10-CM

## 2022-03-31 DIAGNOSIS — N30.00 ACUTE CYSTITIS WITHOUT HEMATURIA: ICD-10-CM

## 2022-03-31 DIAGNOSIS — M79.89 SWELLING OF BOTH LOWER EXTREMITIES: ICD-10-CM

## 2022-03-31 DIAGNOSIS — Z16.12 ESBL (EXTENDED SPECTRUM BETA-LACTAMASE) PRODUCING BACTERIA INFECTION: ICD-10-CM

## 2022-03-31 DIAGNOSIS — A49.9 ESBL (EXTENDED SPECTRUM BETA-LACTAMASE) PRODUCING BACTERIA INFECTION: ICD-10-CM

## 2022-03-31 DIAGNOSIS — N18.4 CKD STAGE 4 DUE TO TYPE 2 DIABETES MELLITUS: Chronic | ICD-10-CM

## 2022-03-31 DIAGNOSIS — R60.9 EDEMA, UNSPECIFIED TYPE: ICD-10-CM

## 2022-03-31 DIAGNOSIS — I95.9 HYPOTENSION, UNSPECIFIED HYPOTENSION TYPE: ICD-10-CM

## 2022-03-31 DIAGNOSIS — E11.22 CKD STAGE 4 DUE TO TYPE 2 DIABETES MELLITUS: Chronic | ICD-10-CM

## 2022-03-31 DIAGNOSIS — Z93.59 SUPRAPUBIC CATHETER: ICD-10-CM

## 2022-03-31 PROCEDURE — 99499 RISK ADDL DX/OHS AUDIT: ICD-10-PCS | Mod: HCNC,S$GLB,, | Performed by: INTERNAL MEDICINE

## 2022-03-31 PROCEDURE — 3008F PR BODY MASS INDEX (BMI) DOCUMENTED: ICD-10-PCS | Mod: CPTII,S$GLB,, | Performed by: INTERNAL MEDICINE

## 2022-03-31 PROCEDURE — 1159F MED LIST DOCD IN RCRD: CPT | Mod: CPTII,S$GLB,, | Performed by: INTERNAL MEDICINE

## 2022-03-31 PROCEDURE — 3008F BODY MASS INDEX DOCD: CPT | Mod: CPTII,S$GLB,, | Performed by: INTERNAL MEDICINE

## 2022-03-31 PROCEDURE — 3066F NEPHROPATHY DOC TX: CPT | Mod: CPTII,S$GLB,, | Performed by: INTERNAL MEDICINE

## 2022-03-31 PROCEDURE — 99214 PR OFFICE/OUTPT VISIT, EST, LEVL IV, 30-39 MIN: ICD-10-PCS | Mod: S$GLB,,, | Performed by: INTERNAL MEDICINE

## 2022-03-31 PROCEDURE — 3079F PR MOST RECENT DIASTOLIC BLOOD PRESSURE 80-89 MM HG: ICD-10-PCS | Mod: CPTII,S$GLB,, | Performed by: INTERNAL MEDICINE

## 2022-03-31 PROCEDURE — 99999 PR PBB SHADOW E&M-EST. PATIENT-LVL V: ICD-10-PCS | Mod: PBBFAC,,, | Performed by: INTERNAL MEDICINE

## 2022-03-31 PROCEDURE — 4010F PR ACE/ARB THEARPY RXD/TAKEN: ICD-10-PCS | Mod: CPTII,S$GLB,, | Performed by: INTERNAL MEDICINE

## 2022-03-31 PROCEDURE — 3066F PR DOCUMENTATION OF TREATMENT FOR NEPHROPATHY: ICD-10-PCS | Mod: CPTII,S$GLB,, | Performed by: INTERNAL MEDICINE

## 2022-03-31 PROCEDURE — 1160F PR REVIEW ALL MEDS BY PRESCRIBER/CLIN PHARMACIST DOCUMENTED: ICD-10-PCS | Mod: CPTII,S$GLB,, | Performed by: INTERNAL MEDICINE

## 2022-03-31 PROCEDURE — 3075F SYST BP GE 130 - 139MM HG: CPT | Mod: CPTII,S$GLB,, | Performed by: INTERNAL MEDICINE

## 2022-03-31 PROCEDURE — 1125F AMNT PAIN NOTED PAIN PRSNT: CPT | Mod: CPTII,S$GLB,, | Performed by: INTERNAL MEDICINE

## 2022-03-31 PROCEDURE — 3079F DIAST BP 80-89 MM HG: CPT | Mod: CPTII,S$GLB,, | Performed by: INTERNAL MEDICINE

## 2022-03-31 PROCEDURE — 4010F ACE/ARB THERAPY RXD/TAKEN: CPT | Mod: CPTII,S$GLB,, | Performed by: INTERNAL MEDICINE

## 2022-03-31 PROCEDURE — 99499 UNLISTED E&M SERVICE: CPT | Mod: HCNC,S$GLB,, | Performed by: INTERNAL MEDICINE

## 2022-03-31 PROCEDURE — 1125F PR PAIN SEVERITY QUANTIFIED, PAIN PRESENT: ICD-10-PCS | Mod: CPTII,S$GLB,, | Performed by: INTERNAL MEDICINE

## 2022-03-31 PROCEDURE — 3044F HG A1C LEVEL LT 7.0%: CPT | Mod: CPTII,S$GLB,, | Performed by: INTERNAL MEDICINE

## 2022-03-31 PROCEDURE — 99215 OFFICE O/P EST HI 40 MIN: CPT | Mod: PBBFAC,PO | Performed by: INTERNAL MEDICINE

## 2022-03-31 PROCEDURE — 3044F PR MOST RECENT HEMOGLOBIN A1C LEVEL <7.0%: ICD-10-PCS | Mod: CPTII,S$GLB,, | Performed by: INTERNAL MEDICINE

## 2022-03-31 PROCEDURE — 1160F RVW MEDS BY RX/DR IN RCRD: CPT | Mod: CPTII,S$GLB,, | Performed by: INTERNAL MEDICINE

## 2022-03-31 PROCEDURE — 99214 OFFICE O/P EST MOD 30 MIN: CPT | Mod: S$GLB,,, | Performed by: INTERNAL MEDICINE

## 2022-03-31 PROCEDURE — 1159F PR MEDICATION LIST DOCUMENTED IN MEDICAL RECORD: ICD-10-PCS | Mod: CPTII,S$GLB,, | Performed by: INTERNAL MEDICINE

## 2022-03-31 PROCEDURE — 99999 PR PBB SHADOW E&M-EST. PATIENT-LVL V: CPT | Mod: PBBFAC,,, | Performed by: INTERNAL MEDICINE

## 2022-03-31 PROCEDURE — 1101F PT FALLS ASSESS-DOCD LE1/YR: CPT | Mod: CPTII,S$GLB,, | Performed by: INTERNAL MEDICINE

## 2022-03-31 PROCEDURE — 1101F PR PT FALLS ASSESS DOC 0-1 FALLS W/OUT INJ PAST YR: ICD-10-PCS | Mod: CPTII,S$GLB,, | Performed by: INTERNAL MEDICINE

## 2022-03-31 PROCEDURE — 3288F FALL RISK ASSESSMENT DOCD: CPT | Mod: CPTII,S$GLB,, | Performed by: INTERNAL MEDICINE

## 2022-03-31 PROCEDURE — 3075F PR MOST RECENT SYSTOLIC BLOOD PRESS GE 130-139MM HG: ICD-10-PCS | Mod: CPTII,S$GLB,, | Performed by: INTERNAL MEDICINE

## 2022-03-31 PROCEDURE — 3288F PR FALLS RISK ASSESSMENT DOCUMENTED: ICD-10-PCS | Mod: CPTII,S$GLB,, | Performed by: INTERNAL MEDICINE

## 2022-03-31 RX ORDER — ONDANSETRON 8 MG/1
8 TABLET, ORALLY DISINTEGRATING ORAL EVERY 12 HOURS PRN
Qty: 30 TABLET | Refills: 2 | Status: SHIPPED | OUTPATIENT
Start: 2022-03-31 | End: 2022-06-01

## 2022-03-31 RX ORDER — PROMETHAZINE HYDROCHLORIDE 25 MG/ML
25 INJECTION, SOLUTION INTRAMUSCULAR; INTRAVENOUS
Status: COMPLETED | OUTPATIENT
Start: 2022-03-31 | End: 2022-03-31

## 2022-03-31 RX ORDER — PROMETHAZINE HYDROCHLORIDE 25 MG/1
25 TABLET ORAL
Status: DISCONTINUED | OUTPATIENT
Start: 2022-03-31 | End: 2022-03-31

## 2022-03-31 RX ADMIN — PROMETHAZINE HYDROCHLORIDE 25 MG: 25 INJECTION, SOLUTION INTRAMUSCULAR; INTRAVENOUS at 10:03

## 2022-03-31 NOTE — PROGRESS NOTES
Subjective:       Patient ID: Cecile Bowen is a 71 y.o. female.    Chief Complaint: Hospital Follow Up    HPI     71-year-old female here for hospital follow-up.  Discharge 02/25/2022    Family and/or Caretaker present at visit?  No.  Diagnostic tests reviewed/disposition: I have reviewed all completed as well as pending diagnostic tests at the time of discharge.  Disease/illness education:  Hypotension, UTI  Home health/community services discussion/referrals: Patient has home health established at [facility].   Establishment or re-establishment of referral orders for community resources: No other necessary community resources.   Discussion with other health care providers: No discussion with other health care providers necessary.  I reviewed and reconciled the medications from hospital discharge.    Patient was admitted to the hospital for hypotension and UTI.  Patient was found have a blood pressure of 76/43.  Patient was treated with meropenem for an ESBL UTI.  She was sent home on antibiotics.  She did not have a stone, but a mixture of sludge in the area where she had the stent.    She reports that she started to vomit shortly after she got out of the hospital.  She has been vomiting since 2-3 times a week.  She has no fevers or chills.  She has an upset stomach.  This is since she has been out of the hospital.  This can happen after she drinks boost, but not always.    She was on dialysis, but her kidneys woke up while she was in the hospital.  She had the port kept in place in case it is needed.    Discharge summary:  HPI:   Cecile Bowen is a 71 y.o. female with PMHx significant for HTN, HLD, hx of ESBL UTI, ESRD on HD admitted to observation for hypotension, found to have a UTI. Patient was at her dialysis clinic today when she was found to have a BP of 76/43 and advised to come to the ED for evaluation. Reports her SBPs usually run in the 120s. Endorses cloudy urine in her catheter bag for the  past few days, and reports it was last changed about a week ago. Denies lightheadedness, dizziness, SOB, fatigue, weakness, HA, CP, cough, abdominal pain, n/v. Patient was recently hospitalized for a ESBL UTI for which she completed her course of meropenem. Of note, patient also has a right subclavian dialysis access that was also changed about a week ago.      In the ED, BP 94/24 improved to 102/57. Remaining VSS. WBC 8.06. Lactic 1.8. Procal 0.12. Hgb 8.5 (~BL). . Cr 2.3 (~BL). Glucose 224. UA with 1+ leuks, 28 RBCs, >100 WBCs. CXR with no acute processes. Given Vanc 2g x 1.     Hospital Course:   Admitted to  for possible UTI. Cultures obtained, suprapubic catheter changes apparently in the ED, and started Abx. Nephrology consulted for HD. Pt continued on broad antibiotics w/improvement in signs and symptoms. Final culture results w/klebsiella pneumonia- susceptible to connie and bactrim. Urology evaluated patient, 02/14. Pt w/h/o Lt ureteral stone- s/p stent placement 06/2021 and replacement 12/2021- intermittently lost to follow-up. Planning for ureteroscopy 02/18 w/definitive stone management and stent removal as patient has high likelihood to continue to be lost to f/u. Agreed that it was in the best interest of the patient to remain inpatient to undergo further interventions/evaluations via urology. Pt was transitioned to bactrim 02/16- 2/19 and connie stopped. To OR with Urology on 2/18:  Left ureteroscopy, Cystoscopy,  Left ureteral biopsy,  Left JJ ureteral stent exchange,  Left retrograde pyelogram. Nurse removed stent on strings on 2/21.  Apixaban resumed.   Nephrology followed and discontinued HD while admitted.  Patient to retain HD catheter until f/u with nephrology.  Cr stable at 1.9 at discharge. Patient discharged with home health.    * Acute cystitis with hematuria  - SIRS: 0/4; hypotension  - Hx of ESBL UTI; completed course of meropenem  - UA with 1+ leuks, 28 RBCs, >100 WBCs  - UCx w/GNR  >100,000- susceptibilities to connie and bactrim- continue antibiotics leading up to ureteroscopy and dc post-op- stop connie - transition to Bactrim DS qday until 02/19  - Given Vanc in ED   - suprapubic catheter changed in the ED, and most recently changed in OR 2/18 and on 2/24 due to leaking     CKD (chronic kidney disease) requiring chronic dialysis  - HD MWF  - Nephrology following  - continue sevelamer carbonate 800mg TIDW  - improving  - IV hydration following Cystoscope 2/18.  - sCr and potassium elevated 2/19; trial of Lokelma and 1 L NS. Monitor closely  - sCr increasing but potassium improved with Lokelma.  - sCr stable; monitoring without HD or Lokelma  - sCr improving  - discontinued sevelamer     Pressure injury of right buttock, stage 3  - present on admission  - wound care consulted, appreciate recs: Nursing to cleanse buttocks with cleansing cloths or sea cleanse wound cleanser, then apply a thin layer of Triad ointment to affected areas BID/PRN after cleaning. Leave open to air. No cover dressing needed.  - turn patient q2h     Urinary tract infection due to ESBL Klebsiella  - completed antibiotics     Secondary hypertension  - Continue lopressor, lisinopril and clonidine  - Discontinued Lasix   - Titrate BP meds accordingly  - BP elevated 2/23: avoid hydralazine due to resolving ZAK per Nephrology. Started Nifedipine; received 90 mg on 2/23. Clonidine increased to 0.3 mg. Torsemide started.  -nifedipine increased on 2/24     A-fib  - continue Eliquis 2.5mg BID  - continue Lopressor 25mg BID      Left ureteral stone  - h/o left ureteral stone and indwelling left ureteral stent  - stent placed 06/30/21- pt lost to follow up - stent exchanged 12/23/21  - Urology following: plan for ureteroscopy w/laser lithotripsy vs. Stone basket extraction vs. Stent replacement 02/18/22  - Per Urology on 2/18:  Left ureteroscopy, Cystoscopy,  Left ureteral biopsy,  Left JJ ureteral stent exchange,  Left retrograde  pyelogram.   - Stent with strings removed 2/21.  - Follow up with Urology     Malignant neoplasm of left breast, estrogen receptor positive  - continue home anastrozole 1mg daily     CKD stage 4 due to type 2 diabetes mellitus  ZAK requiring HD has improved and patient no longer requires HD  - HD MWF  - Nephrology following  - continue sevelamer carbonate 800mg TIDW  - improving  - IV hydration following Cystoscope 2/18.  - sCr and potassium elevated 2/19; trial of Lokelma and 1 L NS. Monitor closely  - when potassium elevated, improved with Lokelma.  - sCr stable; monitoring without HD or Lokelma  - sCr improving and HD discontinued  - discontinued sevelamer        Chronic pain  - follows w/pain specialist  - Improved back pain   - continue Norco 10mg q6h for moderate pain     Suprapubic catheter  - exchanged Q month - last on 2/24  -home health changes at home     Uncontrolled type 2 diabetes mellitus with stage 4 chronic kidney disease, with long-term current use of insulin  - uncontrolled on admit  - continued Levemir 13 units daily, Novolog 4 units TIDW, SSI  - up titrate as needed  - resume home insulin regimen at discharge     Mixed migraine and muscle contraction headache  - stable  - home Fioricet and sumatriptan prn     Neurogenic bladder  - noted, has suprapubic catheter   - SPC changed on 2/24   -continue monthly change with home health     Urinary retention  - continue home oxybutynin 10mg daily     Recurrent urinary tract infection  - see 'UTI due to Klebsiella'     Hyperlipidemia associated with type 2 diabetes mellitus  - continue Lipitor 80mg daily     Hypothyroidism  - continue home Synthroid 50mcg     Review of Systems      Objective:      Physical Exam  Vitals reviewed.   Constitutional:       Appearance: She is well-developed.   HENT:      Head: Normocephalic and atraumatic.      Mouth/Throat:      Pharynx: No oropharyngeal exudate.   Eyes:      General: No scleral icterus.        Right eye: No  discharge.         Left eye: No discharge.      Pupils: Pupils are equal, round, and reactive to light.   Neck:      Thyroid: No thyromegaly.      Trachea: No tracheal deviation.   Cardiovascular:      Rate and Rhythm: Normal rate and regular rhythm.      Heart sounds: Normal heart sounds. No murmur heard.    No friction rub. No gallop.   Pulmonary:      Effort: Pulmonary effort is normal. No respiratory distress.      Breath sounds: Normal breath sounds. No wheezing or rales.   Chest:      Chest wall: No tenderness.   Abdominal:      General: Bowel sounds are normal. There is no distension.      Palpations: Abdomen is soft. There is no mass.      Tenderness: There is no abdominal tenderness. There is no guarding or rebound.   Musculoskeletal:         General: Swelling (2+ pitting edema) present. No tenderness. Normal range of motion.      Cervical back: Normal range of motion and neck supple.   Skin:     General: Skin is warm and dry.      Coloration: Skin is not pale.      Findings: No erythema or rash.   Neurological:      Mental Status: She is alert and oriented to person, place, and time.   Psychiatric:         Behavior: Behavior normal.         Assessment:       1. Hospital discharge follow-up    2. Hypotension, unspecified hypotension type    3. ESBL (extended spectrum beta-lactamase) producing bacteria infection    4. Acute cystitis without hematuria    5. Nausea and vomiting, intractability of vomiting not specified, unspecified vomiting type  - ondansetron (ZOFRAN-ODT) 8 MG TbDL; Take 1 tablet (8 mg total) by mouth every 12 (twelve) hours as needed.  Dispense: 30 tablet; Refill: 2  - X-Ray Chest PA And Lateral; Future  - CBC Auto Differential; Future  - Comprehensive Metabolic Panel; Future  - Urinalysis; Future  - Urine culture; Future  - Procalcitonin; Future  - Sedimentation rate; Future  - C-Reactive Protein; Future    6. Suprapubic catheter  - Urine culture; Future    7. Cloudy urine  - Urine culture;  Future    8. CKD stage 4 due to type 2 diabetes mellitus  - Ambulatory referral/consult to Nephrology; Future    9. Swelling of both lower extremities  - CV US Lower Extremity Veins Bilateral Insufficiency; Future    10. Edema, unspecified type   - CV US Lower Extremity Veins Bilateral Insufficiency; Future      Plan:       1/2/3/4.  Completed course of antibiotics in hospital.  5/6/7. Check CBC, CMP, urinalysis, urine culture, procalcitonin, ESR, CRP.  Check urinalysis, urine culture.  Check chest x-ray.  Zofran prescribed.  Phenergan given in clinic.  8. Referral to Nephrology entered.  9/10.  Check ultrasound.

## 2022-04-04 ENCOUNTER — TELEPHONE (OUTPATIENT)
Dept: INTERNAL MEDICINE | Facility: CLINIC | Age: 72
End: 2022-04-04
Payer: MEDICARE

## 2022-04-04 RX ORDER — CEFDINIR 300 MG/1
300 CAPSULE ORAL 2 TIMES DAILY
Qty: 14 CAPSULE | Refills: 0 | Status: SHIPPED | OUTPATIENT
Start: 2022-04-04 | End: 2022-04-11

## 2022-04-04 RX ORDER — SULFAMETHOXAZOLE AND TRIMETHOPRIM 800; 160 MG/1; MG/1
1 TABLET ORAL 2 TIMES DAILY
Qty: 14 TABLET | Refills: 0 | Status: SHIPPED | OUTPATIENT
Start: 2022-04-04 | End: 2022-04-11

## 2022-04-05 ENCOUNTER — TELEPHONE (OUTPATIENT)
Dept: INTERNAL MEDICINE | Facility: CLINIC | Age: 72
End: 2022-04-05
Payer: MEDICARE

## 2022-04-05 NOTE — TELEPHONE ENCOUNTER
Spoke to representative and they needed form signed and initialed on correction, will refax form to them

## 2022-04-05 NOTE — TELEPHONE ENCOUNTER
----- Message from Sumaya Emanuel sent at 4/5/2022  1:24 PM CDT -----  Contact: Mr. Aquino Direct# [phone], fax# [phone]  Mr. Denis at [facility] is calling in regards to him saying that he faxed over a physicians form to you on 04/01/2022 and 04/04/2022 for the patient and he would like to know if you have received it please?

## 2022-04-07 ENCOUNTER — PATIENT MESSAGE (OUTPATIENT)
Dept: PHYSICAL MEDICINE AND REHAB | Facility: CLINIC | Age: 72
End: 2022-04-07
Payer: MEDICARE

## 2022-04-07 ENCOUNTER — PATIENT OUTREACH (OUTPATIENT)
Dept: HOME HEALTH SERVICES | Facility: HOSPITAL | Age: 72
End: 2022-04-07
Payer: MEDICARE

## 2022-04-07 RX ORDER — HYDROCODONE BITARTRATE AND ACETAMINOPHEN 10; 325 MG/1; MG/1
1 TABLET ORAL 3 TIMES DAILY PRN
Qty: 90 TABLET | Refills: 0 | Status: SHIPPED | OUTPATIENT
Start: 2022-04-08 | End: 2022-04-13 | Stop reason: SDUPTHER

## 2022-04-09 ENCOUNTER — PATIENT MESSAGE (OUTPATIENT)
Dept: PHYSICAL MEDICINE AND REHAB | Facility: CLINIC | Age: 72
End: 2022-04-09
Payer: MEDICARE

## 2022-04-09 ENCOUNTER — PATIENT MESSAGE (OUTPATIENT)
Dept: INTERNAL MEDICINE | Facility: CLINIC | Age: 72
End: 2022-04-09
Payer: MEDICARE

## 2022-04-12 ENCOUNTER — PATIENT OUTREACH (OUTPATIENT)
Dept: ADMINISTRATIVE | Facility: OTHER | Age: 72
End: 2022-04-12
Payer: MEDICARE

## 2022-04-12 NOTE — PROGRESS NOTES
CHW - Follow Up    This Community Health Worker completed a follow up visit with Cecile Bowen by telephone today.  Pt/Caregiver reported: Still requesting hoveround.  Community Health Worker provided: Information  Follow up required: Yes.  Follow-up Outreach - Due: 4/18/2022

## 2022-04-13 ENCOUNTER — TELEPHONE (OUTPATIENT)
Dept: PHYSICAL MEDICINE AND REHAB | Facility: CLINIC | Age: 72
End: 2022-04-13
Payer: MEDICARE

## 2022-04-13 RX ORDER — HYDROCODONE BITARTRATE AND ACETAMINOPHEN 10; 325 MG/1; MG/1
1 TABLET ORAL EVERY 6 HOURS PRN
Qty: 120 TABLET | Refills: 0 | Status: SHIPPED | OUTPATIENT
Start: 2022-04-30 | End: 2022-05-09 | Stop reason: SDUPTHER

## 2022-04-13 NOTE — TELEPHONE ENCOUNTER
I called the patient.  Her prescription for hydrocodone/APAP 10/325 was changes last months and this month 290 tablets that of 120 tablets.  She is saying that 3 tablets did not help her pain.  She is taking 4 times per day.  Her last  was on 04/08/2022.  I told her I will send the next refill for 04/30/2022 and go up to 4 tablets per day as needed.

## 2022-04-13 NOTE — TELEPHONE ENCOUNTER
----- Message from Justin Reyes sent at 4/13/2022  4:15 PM CDT -----  Contact: @ [phone]   Patient requesting a return call about dispense amount changes to ( HYDROcodone-acetaminophen (NORCO)  mg per tablet) pls call to discuss further

## 2022-04-16 ENCOUNTER — PATIENT MESSAGE (OUTPATIENT)
Dept: NEUROLOGY | Facility: CLINIC | Age: 72
End: 2022-04-16
Payer: MEDICARE

## 2022-04-18 NOTE — TELEPHONE ENCOUNTER
Offered 4/22, 3pm, appointment with Dr. Henao, accepted. I have messaged Zina about adding her on

## 2022-04-19 ENCOUNTER — PATIENT OUTREACH (OUTPATIENT)
Dept: ADMINISTRATIVE | Facility: OTHER | Age: 72
End: 2022-04-19
Payer: MEDICARE

## 2022-04-19 NOTE — PROGRESS NOTES
CHW - Case Closure    This Community Health Worker spoke to Cecile Bowen by telephone today.   Pt/Caregiver reported: Being tired today.  Pt/Caregiver denied any additional needs at this time and agrees with episode closure at this time.  Provided Cecile Bowen with Community Health Worker's contact information and encouraged him/her to contact this Community Health Worker if additional needs arise.

## 2022-04-20 ENCOUNTER — EXTERNAL HOME HEALTH (OUTPATIENT)
Dept: HOME HEALTH SERVICES | Facility: HOSPITAL | Age: 72
End: 2022-04-20
Payer: MEDICARE

## 2022-04-21 ENCOUNTER — PATIENT OUTREACH (OUTPATIENT)
Dept: ADMINISTRATIVE | Facility: OTHER | Age: 72
End: 2022-04-21
Payer: MEDICARE

## 2022-04-21 NOTE — PROGRESS NOTES
Health Maintenance Due   Topic Date Due    TETANUS VACCINE  Never done    Shingles Vaccine (1 of 2) 02/10/2014    Mammogram  07/08/2020    Foot Exam  10/11/2020    Eye Exam  10/28/2020    Influenza Vaccine (1) 09/01/2021    Pneumococcal Vaccines (Age 65+) (3 - PPSV23 or PCV20) 09/19/2021    Colorectal Cancer Screening  01/13/2022     Updates were requested from care everywhere.  Chart was reviewed for overdue [facility]active [facility] Encounters ([facility]) topics (CRS, Breast Cancer Screening, Eye exam)  Health Maintenance has been updated.  LINKS immunization registry triggered.  Immunizations were reconciled.

## 2022-04-25 ENCOUNTER — TELEPHONE (OUTPATIENT)
Dept: PHYSICAL MEDICINE AND REHAB | Facility: CLINIC | Age: 72
End: 2022-04-25
Payer: MEDICARE

## 2022-04-25 RX ORDER — DULOXETIN HYDROCHLORIDE 30 MG/1
30 CAPSULE, DELAYED RELEASE ORAL DAILY
Qty: 30 CAPSULE | Refills: 3 | Status: SHIPPED | OUTPATIENT
Start: 2022-04-25 | End: 2022-05-10

## 2022-04-25 NOTE — TELEPHONE ENCOUNTER
----- Message from Yoon Martin sent at 4/25/2022  2:52 PM CDT -----  Who Called: Patient    What is the reqeust in detail: Requesting call back to discuss being in intense pain and the hydrocodone is not working. Please advise.     Can the clinic reply by [facility]? No    Best Call Back Number: [phone]    Additional Information:

## 2022-04-25 NOTE — TELEPHONE ENCOUNTER
I called the patient.  Her back pain and radicular symptoms have been worse.  I told her I will start her on duloxetine.  Going up on hydrocodone/APAP or switching it to another opiate is a last resort.

## 2022-04-28 ENCOUNTER — PATIENT MESSAGE (OUTPATIENT)
Dept: ADMINISTRATIVE | Facility: HOSPITAL | Age: 72
End: 2022-04-28
Payer: MEDICARE

## 2022-04-28 ENCOUNTER — PATIENT OUTREACH (OUTPATIENT)
Dept: ADMINISTRATIVE | Facility: HOSPITAL | Age: 72
End: 2022-04-28
Payer: MEDICARE

## 2022-05-09 RX ORDER — HYDROCODONE BITARTRATE AND ACETAMINOPHEN 10; 325 MG/1; MG/1
1 TABLET ORAL EVERY 6 HOURS PRN
Qty: 120 TABLET | Refills: 0 | Status: SHIPPED | OUTPATIENT
Start: 2022-05-10 | End: 2022-06-08 | Stop reason: SDUPTHER

## 2022-05-09 NOTE — TELEPHONE ENCOUNTER
----- Message from Sanjeev George sent at 5/9/2022 12:15 PM CDT -----  Regarding: Prescrioption Inquiry  HYDROcodone-acetaminophen (NORCO)  mg per tablet        [facility]/pharmacy #[id number] - [place], [place] - [address] (Ph: [phone])        Pt called and advised they she was short some pills, she usually gets 120 pills and she only got 90 pills this time. Pt stated she is now out and need a refill.

## 2022-05-12 ENCOUNTER — DOCUMENT SCAN (OUTPATIENT)
Dept: HOME HEALTH SERVICES | Facility: HOSPITAL | Age: 72
End: 2022-05-12
Payer: MEDICARE

## 2022-05-19 PROCEDURE — G0179 MD RECERTIFICATION HHA PT: HCPCS | Mod: ,,, | Performed by: INTERNAL MEDICINE

## 2022-05-19 PROCEDURE — G0179 PR HOME HEALTH MD RECERTIFICATION: ICD-10-PCS | Mod: ,,, | Performed by: INTERNAL MEDICINE

## 2022-05-28 ENCOUNTER — PATIENT MESSAGE (OUTPATIENT)
Dept: INTERNAL MEDICINE | Facility: CLINIC | Age: 72
End: 2022-05-28
Payer: MEDICARE

## 2022-05-30 NOTE — TELEPHONE ENCOUNTER
Who should she be following up with on this?  Pt saw Dr. Kearney for a hospital d/c f/u in 3/2022.

## 2022-05-30 NOTE — TELEPHONE ENCOUNTER
In review of the chart, it appears that this is under the oversight of Urology and would recommend that she contact them

## 2022-06-01 DIAGNOSIS — R11.2 NAUSEA AND VOMITING, INTRACTABILITY OF VOMITING NOT SPECIFIED, UNSPECIFIED VOMITING TYPE: ICD-10-CM

## 2022-06-01 RX ORDER — ONDANSETRON 8 MG/1
TABLET, ORALLY DISINTEGRATING ORAL
Qty: 30 TABLET | Refills: 2 | Status: SHIPPED | OUTPATIENT
Start: 2022-06-01 | End: 2023-02-27

## 2022-06-07 ENCOUNTER — TELEPHONE (OUTPATIENT)
Dept: INTERNAL MEDICINE | Facility: CLINIC | Age: 72
End: 2022-06-07
Payer: MEDICARE

## 2022-06-07 NOTE — TELEPHONE ENCOUNTER
----- Message from Angella Santos sent at 6/7/2022  2:02 PM CDT -----  Regarding: Home Health Aid  [facility] home health calling to find out if an order can be placed for the patient to have a Home health aid. Please call [phone] (Adrienne).  Or fax the info to [phone]

## 2022-06-07 NOTE — TELEPHONE ENCOUNTER
Spoke to Angelica with the [facility] . I advised that pt not seen by Dr. Cancino since 2020. However the pt has been seen once by the Dr. Cancino's associate, Dr. Sánchez Kearney in 3/2022.  Angelica stated that it would be ok Dr. Cancino to ok the HH aide.  Verbal ok given.

## 2022-06-08 ENCOUNTER — PATIENT MESSAGE (OUTPATIENT)
Dept: PHYSICAL MEDICINE AND REHAB | Facility: CLINIC | Age: 72
End: 2022-06-08
Payer: MEDICARE

## 2022-06-08 ENCOUNTER — PATIENT MESSAGE (OUTPATIENT)
Dept: UROLOGY | Facility: CLINIC | Age: 72
End: 2022-06-08
Payer: MEDICARE

## 2022-06-08 DIAGNOSIS — Z98.890 HISTORY OF INSERTION OF TUNNELED CENTRAL VENOUS CATHETER (CVC) WITH PORT: Primary | ICD-10-CM

## 2022-06-08 RX ORDER — HYDROCODONE BITARTRATE AND ACETAMINOPHEN 10; 325 MG/1; MG/1
1 TABLET ORAL EVERY 6 HOURS PRN
Qty: 120 TABLET | Refills: 0 | Status: SHIPPED | OUTPATIENT
Start: 2022-06-09 | End: 2022-07-11 | Stop reason: SDUPTHER

## 2022-06-08 NOTE — TELEPHONE ENCOUNTER
A message was left for Jeniffer Milan with vascular surgery at [facility] to schedule catheter removal from dialysis.

## 2022-06-08 NOTE — TELEPHONE ENCOUNTER
General surgery team can you schedule a tunneled catheter removal for Dr. López's post dialysis patient? Please advise.

## 2022-06-08 NOTE — TELEPHONE ENCOUNTER
Forwarded to Dr. López.    Patient is looking to have her dialysis chest port removed. Please see portal message.

## 2022-06-13 ENCOUNTER — TELEPHONE (OUTPATIENT)
Dept: INTERNAL MEDICINE | Facility: CLINIC | Age: 72
End: 2022-06-13
Payer: MEDICARE

## 2022-06-13 DIAGNOSIS — Z79.4 TYPE 2 DIABETES MELLITUS WITH HYPERGLYCEMIA, WITH LONG-TERM CURRENT USE OF INSULIN: Primary | ICD-10-CM

## 2022-06-13 DIAGNOSIS — E11.65 TYPE 2 DIABETES MELLITUS WITH HYPERGLYCEMIA, WITH LONG-TERM CURRENT USE OF INSULIN: Primary | ICD-10-CM

## 2022-06-13 RX ORDER — INSULIN LISPRO 100 [IU]/ML
2-10 INJECTION, SOLUTION INTRAVENOUS; SUBCUTANEOUS
Qty: 30 ML | Refills: 6 | Status: SHIPPED | OUTPATIENT
Start: 2022-06-13 | End: 2023-01-31 | Stop reason: SDUPTHER

## 2022-06-13 NOTE — SUBJECTIVE & OBJECTIVE
Interval History: Patient seen and examined today. Denies compliants. Eager to go home but amendable to [facility]. She is in good spirits otherwise. Discussed need to have plan for return home to avoid NH placement in future.     Review of Systems   Constitutional: Negative for chills and fever.   HENT: Negative for congestion, nosebleeds and sore throat.    Eyes: Negative for photophobia and pain.   Respiratory: Negative for cough, chest tightness and shortness of breath.    Cardiovascular: Negative for chest pain and palpitations.   Gastrointestinal: Negative for abdominal distention, blood in stool, diarrhea, nausea and vomiting.   Genitourinary: Negative for dysuria and hematuria.        Sx similar to UTIs in past   Musculoskeletal: Negative for back pain and myalgias.   Skin: Negative for rash and wound.   Neurological: Positive for weakness. Negative for dizziness, numbness and headaches.   Psychiatric/Behavioral: Negative for behavioral problems and confusion. The patient is not nervous/anxious.      Objective:     Vital Signs (Most Recent):  Temp: 99 °F (37.2 °C) (01/24/22 1613)  Pulse: 82 (01/24/22 1909)  Resp: 18 (01/24/22 1613)  BP: 134/66 (01/24/22 1909)  SpO2: (!) 92 % (01/24/22 1909) Vital Signs (24h Range):  Temp:  [98.1 °F (36.7 °C)-99 °F (37.2 °C)] 99 °F (37.2 °C)  Pulse:  [70-87] 82  Resp:  [17-18] 18  SpO2:  [92 %-96 %] 92 %  BP: (126-174)/(65-74) 134/66     Weight: 120.7 kg (265 lb 15.8 oz)  Body mass index is 47.12 kg/m².    Intake/Output Summary (Last 24 hours) at 1/24/2022 1914  Last data filed at 1/24/2022 1400  Gross per 24 hour   Intake 300 ml   Output 2325 ml   Net -2025 ml      Physical Exam  Vitals and nursing note reviewed.   Constitutional:       General: She is not in acute distress.     Appearance: Normal appearance. She is obese. She is not ill-appearing.   HENT:      Head: Normocephalic and atraumatic.      Right Ear: External ear normal.      Left Ear: External ear normal.       Nose: Nose normal.      Mouth/Throat:      Mouth: Mucous membranes are moist.      Pharynx: Oropharynx is clear.   Eyes:      General: No scleral icterus.     Extraocular Movements: Extraocular movements intact.      Pupils: Pupils are equal, round, and reactive to light.   Cardiovascular:      Rate and Rhythm: Normal rate and regular rhythm.      Pulses: Normal pulses.      Heart sounds: Normal heart sounds. No murmur heard.  No gallop.    Pulmonary:      Effort: Pulmonary effort is normal. No respiratory distress.      Breath sounds: Normal breath sounds. No rales.      Comments: Right TDC  Abdominal:      General: Abdomen is flat. Bowel sounds are normal. There is no distension.      Palpations: Abdomen is soft.      Tenderness: There is no abdominal tenderness.      Comments: Suprapubic catheter   Musculoskeletal:         General: No swelling. Normal range of motion.      Cervical back: Normal range of motion and neck supple. No rigidity or tenderness.      Right lower leg: Edema present.      Left lower leg: Edema present.   Skin:     General: Skin is warm and dry.      Capillary Refill: Capillary refill takes less than 2 seconds.      Coloration: Skin is not jaundiced.      Findings: No rash.   Neurological:      General: No focal deficit present.      Mental Status: She is alert and oriented to person, place, and time. Mental status is at baseline.      Motor: Weakness present.   Psychiatric:         Mood and Affect: Mood normal.         Behavior: Behavior normal.         Significant Labs:   All pertinent labs within the past 24 hours have been reviewed.  CBC:   Recent Labs   Lab 01/23/22 0332 01/24/22 0339   WBC 5.35 5.45   HGB 7.8* 8.0*   HCT 24.8* 26.2*    237     CMP:   Recent Labs   Lab 01/23/22 0332 01/24/22 0339   * 132*   K 3.4* 3.3*   CL 99 96   CO2 25 25   * 158*   BUN 16 20   CREATININE 1.3 1.8*   CALCIUM 8.0* 8.1*   ANIONGAP 10 11   EGFRNONAA 42.0* 28.3*       Significant  Imaging: I have reviewed all pertinent imaging results/findings within the past 24 hours.   negative

## 2022-06-13 NOTE — TELEPHONE ENCOUNTER
----- Message from Jordyn Brewster sent at 6/13/2022  4:07 PM CDT -----  Contact: Patient   Patient requesting call back in regard to a Rx.     Patient @[phone]

## 2022-06-14 ENCOUNTER — TELEPHONE (OUTPATIENT)
Dept: NEPHROLOGY | Facility: CLINIC | Age: 72
End: 2022-06-14
Payer: MEDICARE

## 2022-06-14 DIAGNOSIS — Z99.2: Primary | ICD-10-CM

## 2022-06-14 NOTE — TELEPHONE ENCOUNTER
Please let me know how patient can be contacted for removal of a tunneled catheter by general surgery. Do you have a contact number or name I can reach out to? The patient is having pain at the site now.

## 2022-06-15 NOTE — TELEPHONE ENCOUNTER
Patient needs her tunneled catheter removed, it is causing soreness at the site. Can you please help schedule her for removal?

## 2022-06-16 NOTE — TELEPHONE ENCOUNTER
I went into hospital stay from Nov 2021 and tunneled catheter was placed originally by IR, Dr. Hassan 12/21/2021. Please refer pt to IR for tunneled catheter removal. Ty

## 2022-06-16 NOTE — TELEPHONE ENCOUNTER
A message was left for Zoë Dixon, manager with Vascular and general surgery to help schedule patient for tunneled catheter removal.

## 2022-06-17 ENCOUNTER — PATIENT MESSAGE (OUTPATIENT)
Dept: NEPHROLOGY | Facility: CLINIC | Age: 72
End: 2022-06-17
Payer: MEDICARE

## 2022-06-17 NOTE — TELEPHONE ENCOUNTER
"Order placed. Pt is on Eliquis but there was not a "box to select" Eliquis so I placed it in the comments section in two areas.   "

## 2022-06-17 NOTE — TELEPHONE ENCOUNTER
A second call was placed to Dr. Jaya Hassan staff as well as chart notes forwarded to schedule patient for tunneled catheter removal.

## 2022-06-17 NOTE — TELEPHONE ENCOUNTER
Marion Garcia with IR contacted the office and says the referral needs to be changed to [id number] ambulatory referral/consult to Interventional RAD. She will contact patient to schedule her.

## 2022-06-19 ENCOUNTER — PATIENT MESSAGE (OUTPATIENT)
Dept: NEUROLOGY | Facility: CLINIC | Age: 72
End: 2022-06-19
Payer: MEDICARE

## 2022-06-20 ENCOUNTER — TELEPHONE (OUTPATIENT)
Dept: INTERNAL MEDICINE | Facility: CLINIC | Age: 72
End: 2022-06-20
Payer: MEDICARE

## 2022-06-20 ENCOUNTER — TELEPHONE (OUTPATIENT)
Dept: INTERVENTIONAL RADIOLOGY/VASCULAR | Facility: CLINIC | Age: 72
End: 2022-06-20
Payer: MEDICARE

## 2022-06-20 DIAGNOSIS — G43.809 CERVICOGENIC MIGRAINE: Primary | Chronic | ICD-10-CM

## 2022-06-20 DIAGNOSIS — Z99.2 DIALYSIS PATIENT: Primary | ICD-10-CM

## 2022-06-20 RX ORDER — METHYLPREDNISOLONE 4 MG/1
TABLET ORAL
Qty: 21 EACH | Refills: 0 | Status: SHIPPED | OUTPATIENT
Start: 2022-06-20 | End: 2022-07-05 | Stop reason: ALTCHOICE

## 2022-06-20 NOTE — TELEPHONE ENCOUNTER
----- Message from Geno Tobar sent at 6/20/2022  1:51 PM CDT -----  Contact: home health medical/gareth/[phone]/krista [phone]  Nurse called in regards to the pt central line needs to be pulled and would like to get and order to pull it in a control setting. The pt primary nurse is krista. Call back    Please advice

## 2022-06-20 NOTE — TELEPHONE ENCOUNTER
----- Message from Diana Griffin sent at 6/20/2022  2:43 PM CDT -----  Contact: The medical team (gareth) [phone]  Patient is returning a phone call.  Who left a message for the patient: kingsley  Does patient know what this is regarding: n/a   Would you like a call back, or a response through your [facility] portal?: call  Comments:

## 2022-06-20 NOTE — TELEPHONE ENCOUNTER
Spoke to Adrienne with the Medical Team. I advised that they will need to contact the pt's nephrologist to inquire, since they are already working on this.  Adrienne ok with this.

## 2022-06-20 NOTE — TELEPHONE ENCOUNTER
Returned call to Adrienne with the [facility]. She was not available. Left message with  to call back to discuss.    Spoke to Olga (nurse) with the [facility]. Advised that the pt's nephrologist's office seems to be working on this currently.  She will f/u with Dr. López's office.

## 2022-06-24 ENCOUNTER — DOCUMENTATION ONLY (OUTPATIENT)
Dept: HEMATOLOGY/ONCOLOGY | Facility: CLINIC | Age: 72
End: 2022-06-24
Payer: MEDICARE

## 2022-06-24 ENCOUNTER — HOSPITAL ENCOUNTER (OUTPATIENT)
Facility: OTHER | Age: 72
Discharge: HOME | End: 2022-06-24
Attending: RADIOLOGY | Admitting: RADIOLOGY
Payer: MEDICARE

## 2022-06-24 ENCOUNTER — TELEPHONE (OUTPATIENT)
Dept: INTERNAL MEDICINE | Facility: CLINIC | Age: 72
End: 2022-06-24
Payer: MEDICARE

## 2022-06-24 VITALS
OXYGEN SATURATION: 100 % | TEMPERATURE: 98 F | WEIGHT: 231.06 LBS | HEIGHT: 68 IN | SYSTOLIC BLOOD PRESSURE: 161 MMHG | RESPIRATION RATE: 18 BRPM | HEART RATE: 80 BPM | DIASTOLIC BLOOD PRESSURE: 74 MMHG | BODY MASS INDEX: 35.02 KG/M2

## 2022-06-24 DIAGNOSIS — R68.89 DECREASED STRENGTH, ENDURANCE, AND MOBILITY: ICD-10-CM

## 2022-06-24 DIAGNOSIS — R53.81 PHYSICAL DEBILITY: Primary | ICD-10-CM

## 2022-06-24 DIAGNOSIS — Z74.2 NEEDS ASSISTANCE WHILE AT HOME: ICD-10-CM

## 2022-06-24 DIAGNOSIS — N18.6 ESRD (END STAGE RENAL DISEASE): ICD-10-CM

## 2022-06-24 DIAGNOSIS — R53.1 DECREASED STRENGTH, ENDURANCE, AND MOBILITY: ICD-10-CM

## 2022-06-24 DIAGNOSIS — Z74.09 DECREASED STRENGTH, ENDURANCE, AND MOBILITY: ICD-10-CM

## 2022-06-24 DIAGNOSIS — Z99.2 DIALYSIS PATIENT: ICD-10-CM

## 2022-06-24 LAB — POCT GLUCOSE: 148 MG/DL (ref 70–110)

## 2022-06-24 PROCEDURE — 25000003 PHARM REV CODE 250: Performed by: RADIOLOGY

## 2022-06-24 PROCEDURE — 82962 GLUCOSE BLOOD TEST: CPT | Performed by: RADIOLOGY

## 2022-06-24 RX ORDER — LIDOCAINE HYDROCHLORIDE 10 MG/ML
INJECTION, SOLUTION EPIDURAL; INFILTRATION; INTRACAUDAL; PERINEURAL
Status: DISCONTINUED | OUTPATIENT
Start: 2022-06-24 | End: 2022-06-24 | Stop reason: HOSPADM

## 2022-06-24 NOTE — PROCEDURES
Radiology Post-Procedure Note    Pre Op Diagnosis: HD catheter, no longer in use  Post Op Diagnosis: Same    Procedure: HD catheter removal    Procedure performed by: Isauro Anderson MD    Written Informed Consent Obtained: Yes  Specimen Removed: YES HD Catheter  Estimated Blood Loss: Minimal    Findings:   HD catheter removal    Patient tolerated procedure well.    @SIG@

## 2022-06-24 NOTE — CONSULTS
[facility])  Wound Care    Patient Name:  Cecile Bowen   MRN:  [id number]  Date: 6/24/2022  Diagnosis: <principal problem not specified>    History:     Past Medical History:   Diagnosis Date    Cervicogenic migraine     Chronic pain     CKD (chronic kidney disease) stage 4, GFR 15-29 ml/min     [facility]    CKD (chronic kidney disease) stage 4, GFR 15-29 ml/min     Diabetes mellitus     Long term use of Insulin, Diabetic Neuropathy    Dialysis patient 1/21/2022    Fibromyalgia     Hydronephrosis     Hyperlipidemia     Hypertension 12/12/2012    Hypothyroidism 12/12/2012    ARON (iron deficiency anemia)     Insomnia     Levoscoliosis     Malignant neoplasm of upper-outer quadrant of left breast in female, estrogen receptor positive     Metabolic acidosis     Mobility impaired     Nuclear sclerosis - Both Eyes 3/24/2014    VIJAYA (obstructive sleep apnea)     Osteopenia     Pulmonary nodule     Recurrent UTI     Renal manifestation of secondary diabetes mellitus     Secondary hyperparathyroidism, renal     Urinary retention        Social History     Socioeconomic History    Marital status:     Number of children: 0    Highest education level: Master's degree (e.g., MA, MS, Lelo, MEd, MSW, BANDAR)   Occupational History    Occupation: teacher     Comment: retired   Tobacco Use    Smoking status: Never Smoker    Smokeless tobacco: Never Used   Substance and Sexual Activity    Alcohol use: No     Alcohol/week: 0.0 standard drinks    Drug use: No    Sexual activity: Not Currently     Birth control/protection: Abstinence   Social History Narrative    Single ()        Lives w/ sister and brother. Pt needs to keep her narcotics hidden from her brother who will steal them         Brother passed away last year.  Pt lives her her sister. (5/27)     Social Determinants of Health     Financial Resource Strain: Low Risk     Difficulty of Paying Living Expenses: Not hard  at all   Food Insecurity: No Food Insecurity    Worried About Running Out of Food in the Last Year: Never true    Ran Out of Food in the Last Year: Never true   Transportation Needs: No Transportation Needs    Lack of Transportation (Medical): No    Lack of Transportation (Non-Medical): No   Physical Activity: Inactive    Days of Exercise per Week: 0 days    Minutes of Exercise per Session: 0 min   Stress: No Stress Concern Present    Feeling of Stress : Not at all   Social Connections: Socially Isolated    Frequency of Communication with Friends and Family: More than three times a week    Frequency of Social Gatherings with Friends and Family: Never    Attends Buddhist Services: Never    Active Member of Clubs or Organizations: No    Attends Club or Organization Meetings: Never    Marital Status:        Precautions:     Allergies as of 06/21/2022    (No Known Allergies)       [facility] Assessment Details/Treatment     Wound care consult received received from RN for assessment of right posterior thigh wound. Patient now s/p HD catheter removal today. Nursing found skin breakdown to patient's right thigh before patient left for her procedure today. Patient is known to wound care from previous hospital admissions with a history of stage 3 pressure injuries to buttocks. Media file unavailable.     Upon assessment today buttock wounds have resolved with fragile pink scar tissue. Patient now has a a 0.5 x 4 cm right posterior thigh wound of unknown etiology. Skin breakdown likely due to a combination of pressure/moisture due to the location of the wound. The wound is full thickness and base of wound is covered in adherent slough. Recommend Triad ointment to affected area to promote autolytic debridement of necrotic tissue to promote healing and manage moisture. Nursing and MD team notified. Dressing placed on patient after assessment and wound care supplies left with patient and nurse.      06/24/2022

## 2022-06-24 NOTE — PLAN OF CARE
Cecile HEATHER [place] has met all discharge criteria from Phase II. Vital Signs are stable, ambulating  without difficulty. Discharge instructions given, patient verbalized understanding. Discharged from facility via wheelchair in stable condition.

## 2022-06-24 NOTE — TELEPHONE ENCOUNTER
Concerns expressed for pt ability to care for herself in her home environment  Consult Case Mgmt- 1. Physical debility, 2. Decreased strength, endurance , and mobility 3. Needs assist at home

## 2022-06-24 NOTE — H&P
Consult/H&P Note  Interventional Radiology    Consult Requested By: nephrology    Reason for Consult: HD catheter removal    SUBJECTIVE:     Chief Complaint: CKD    History of Present Illness: 72 yo F with CKD, tunneled HD line in place, no longer in use.    Past Medical History:   Diagnosis Date    Cervicogenic migraine     Chronic pain     CKD (chronic kidney disease) stage 4, GFR 15-29 ml/min     [facility]    CKD (chronic kidney disease) stage 4, GFR 15-29 ml/min     Diabetes mellitus     Long term use of Insulin, Diabetic Neuropathy    Dialysis patient 1/21/2022    Fibromyalgia     Hydronephrosis     Hyperlipidemia     Hypertension 12/12/2012    Hypothyroidism 12/12/2012    ARON (iron deficiency anemia)     Insomnia     Levoscoliosis     Malignant neoplasm of upper-outer quadrant of left breast in female, estrogen receptor positive     Metabolic acidosis     Mobility impaired     Nuclear sclerosis - Both Eyes 3/24/2014    VIJAYA (obstructive sleep apnea)     Osteopenia     Pulmonary nodule     Recurrent UTI     Renal manifestation of secondary diabetes mellitus     Secondary hyperparathyroidism, renal     Urinary retention      Past Surgical History:   Procedure Laterality Date    BIOPSY OF URETER Left 2/18/2022    Procedure: BIOPSY, URETER;  Surgeon: Ronel Whittington MD;  Location: [address];  Service: Urology;  Laterality: Left;    BREAST BIOPSY Right     benign    CHOLECYSTECTOMY      COLONOSCOPY N/A 1/13/2017    Procedure: COLONOSCOPY;  Surgeon: Morris Wiseman MD;  Location: [facility] (4TH FLR);  Service: Endoscopy;  Laterality: N/A;  Renal pt Nephrology advised to avoid phosphate preps    CYSTOSCOPY N/A 10/8/2018    Procedure: CYSTOSCOPY;  Surgeon: Ronel Whittington MD;  Location: [address];  Service: Urology;  Laterality: N/A;  45 min    CYSTOSCOPY N/A 3/25/2019    Procedure: CYSTOSCOPY;  Surgeon: Ronel Whittington MD;  [facility] OR [facility]  FL[facility];  Service: Urology;  Laterality: N/A;  45 min    CYSTOSCOPY N/A 8/26/2019    Procedure: CYSTOSCOPY;  Surgeon: Ronel Whittington MD;  Location: [facility] OR 1ST FLR;  Service: Urology;  Laterality: N/A;  45 min    CYSTOSCOPY N/A 7/2/2021    Procedure: CYSTOSCOPY;  Surgeon: Ronel Whittington MD;  Location: [facility] OR 1ST FLR;  Service: Urology;  Laterality: N/A;    CYSTOSCOPY  12/23/2021    Procedure: CYSTOSCOPY;  Surgeon: Ronel Whittington MD;  Location: [facility] OR 1ST FLR;  Service: Urology;;    CYSTOSCOPY  2/18/2022    Procedure: CYSTOSCOPY;  Surgeon: Ronel Whittington MD;  Location: [facility] OR 1ST FLR;  Service: Urology;;    CYSTOSCOPY W/ URETERAL STENT PLACEMENT Left 5/15/2021    Procedure: CYSTOSCOPY, WITH URETERAL STENT INSERTION;  Surgeon: Levy Sánchez Jr., MD;  Location: [facility] OR 1ST FLR;  Service: Urology;  Laterality: Left;    CYSTOSCOPY WITH BIOPSY OF BLADDER N/A 1/27/2020    Procedure: CYSTOSCOPY, WITH BLADDER BIOPSY, WITH FULGURATION IF INDICATED;  Surgeon: Ronel Whittington MD;  [facility];  Service: Urology;  Laterality: N/A;    DILATION AND CURETTAGE OF UTERUS      GALLBLADDER SURGERY  2006    HYSTERECTOMY      INJECTION FOR SENTINEL NODE IDENTIFICATION Left 8/1/2019    Procedure: INJECTION, FOR SENTINEL NODE IDENTIFICATION;  Surgeon: Samia Fulton MD;  Location: [facility] OR 2ND FLR;  Service: General;  Laterality: Left;    INJECTION OF BOTULINUM TOXIN TYPE A N/A 10/8/2018    Procedure: INJECTION, BOTULINUM TOXIN, TYPE A 300 UNITS;  Surgeon: Ronel Whittington MD;  Location: [facility] OR 1ST FLR;  Service: Urology;  Laterality: N/A;    INJECTION OF BOTULINUM TOXIN TYPE A N/A 3/25/2019    Procedure: INJECTION, BOTULINUM TOXIN, TYPE A 300 UNITS;  Surgeon: Ronel Whittington MD;  Location: [facility] OR 1ST FLR;  Service: Urology;  Laterality: N/A;    INJECTION OF BOTULINUM TOXIN TYPE A N/A 8/26/2019    Procedure: INJECTION, BOTULINUM TOXIN, TYPE A 300 UNITS;   Surgeon: Ronel Whittington MD;  [facility];  Service: Urology;  Laterality: N/A;    MASTECTOMY Left 8/1/2019    Procedure: MASTECTOMY 23 hour stay;  Surgeon: Samia Fulton MD;  Location: [facility] OR 2ND FLR;  Service: General;  Laterality: Left;    OVARIAN CYST SURGERY  1985    REPLACEMENT OF STENT Left 12/23/2021    Procedure: REPLACEMENT, STENT;  Surgeon: Ronel Whittington MD;  [facility];  Service: Urology;  Laterality: Left;    REPLACEMENT OF STENT Left 2/18/2022    Procedure: REPLACEMENT, STENT;  Surgeon: Ronel Whittington MD;  [facility];  Service: Urology;  Laterality: Left;    RETROGRADE PYELOGRAPHY Left 2/18/2022    Procedure: PYELOGRAM, RETROGRADE;  Surgeon: Ronel Whittington MD;  [facility];  Service: Urology;  Laterality: Left;    SENTINEL LYMPH NODE BIOPSY Left 8/1/2019    Procedure: BIOPSY, LYMPH NODE, SENTINEL;  Surgeon: Samia Fulton MD;  Location: [facility] OR 2ND FLR;  Service: General;  Laterality: Left;    spt placement      TONSILLECTOMY, ADENOIDECTOMY      TOTAL ABDOMINAL HYSTERECTOMY W/ BILATERAL SALPINGOOPHORECTOMY  1985    URETEROSCOPY Left 2/18/2022    Procedure: URETEROSCOPY;  Surgeon: Ronel Whittington MD;  Location: [facility] OR [address] [place] [address];  Service: Urology;  Laterality: Left;     Family History   Problem Relation Age of Onset    Diabetes Sister     Kidney disease Sister         CKD III    ALS Mother         d.    Cancer Maternal Grandmother         d. colon    Cancer Paternal Grandfather         d. lung    Scoliosis Brother         increased pain    Prostate cancer Brother         cured s/p surgery    Cancer Brother         rare cancer that got into the bones    Diabetes Maternal Aunt     Kidney disease Maternal Aunt     Diabetes Maternal Uncle     Amblyopia Neg Hx     Blindness Neg Hx     Cataracts Neg Hx     Glaucoma Neg Hx     Macular degeneration Neg Hx     Retinal detachment Neg Hx      Strabismus Neg Hx      Social History     Tobacco Use    Smoking status: Never Smoker    Smokeless tobacco: Never Used   Substance Use Topics    Alcohol use: No     Alcohol/week: 0.0 standard drinks    Drug use: No       Review of Systems:  Constitutional/General:No fever, chills, change in appetite or weight loss.  Hematological/Immuno: no known coagulopathies  Respiratory: no shortness of breath  Cardiovascular: no chest pain  Gastrointestinal: no abdominal pain  Genito-Urinary: no dysuria  Musculoskeletal: negative  Skin: Negative for rash, itching, pigmentation changes, nail or hair changes.  Neurological: no TIA or stroke symptoms  Psychiatric: normal mood/affect, good insight/judgement      OBJECTIVE:     Vital Signs Range (Last 24H):  Temp:  [98.6 °F (37 °C)]   Pulse:  [109]   Resp:  [16]   BP: (175)/(80)   SpO2:  [99 %]     Physical Exam:  General- Patient alert and oriented x3 in NAD  ENT- PERRLA,  Neck- No masses  CV- Regular rate and rhythm  Resp-  No increased WOB  GI- Non tender/non-distended  Extrem- No cyanosis, clubbing, edema.   Derm- No rashes, masses, or lesions noted  Neuro-  No focal deficits noted.     Physical Exam  Body mass index is 40.93 kg/m².    Scheduled Meds:   Continuous Infusions:   PRN Meds:    Allergies: Review of patient's allergies indicates:  No Known Allergies    Labs:  No results for input(s): INR in the last 168 hours.    Invalid input(s):  PT,  PTT  No results for input(s): WBC, HGB, HCT, MCV, PLT in the last 168 hours. No results for input(s): GLU, NA, K, CL, CO2, BUN, CREATININE, CALCIUM, MG, ALT, AST, ALBUMIN, BILITOT, BILIDIR in the last 168 hours.    Vitals (Most Recent):  Temp: 98.6 °F (37 °C) (06/24/22 1153)  Pulse: 109 (06/24/22 1153)  Resp: 16 (06/24/22 1153)  BP: (!) 175/80 (06/24/22 1153)  SpO2: 99 % (06/24/22 1153)    ASA: 2  Mallampati: 2    Consent obtained    ASSESSMENT/PLAN:     Tunneled catheter removal. Local anesthesia.    There are no hospital  problems to display for this patient.          Isauro Anderson MD

## 2022-06-24 NOTE — PROGRESS NOTES
SW received secure chat forwarded by Corinne, RN. Pt's dialysis RN sent the original message stating pt had a foul odor in dialysis today and reported to RN that her diaper is only being changed 2x per week by HH.  After chart review CHRIS requested that Dr. Siddiqi (who was included on the message) add SW and aide to HH orders.  CHRIS called Medical Team  (Vicki [phone]) who said they cannot staff with an aide.  CHRIS called [facility] (Yenifer) and she said [facility] is very busy with hospital discharges a and would only be able to send an aide 1x per week.    CHRIS called pt.  She said she has had two horrific nursing home experiences and would never go back.  She said her  and a home health aide are the ones changing her diaper, not nurse.  She lives with her sister but her sister won't change diapers.  Her  is unavailable to come more than once per week. Pt has no other family.  She is unsure whether she can afford sitters.  She has not followed with Dr. Cm in two years.    CHRIS will staff case with fellow Yael and will get back with pt 6/27/22.

## 2022-06-24 NOTE — DISCHARGE SUMMARY
Radiology Discharge Summary      Hospital Course: No complications    Admit Date: 6/24/2022  Discharge Date: 06/24/2022     Instructions Given to Patient: Yes  Diet: Resume prior diet  Activity: activity as tolerated    Description of Condition on Discharge: Stable  Vital Signs (Most Recent): Temp: 98.6 °F (37 °C) (06/24/22 1153)  Pulse: 109 (06/24/22 1153)  Resp: 16 (06/24/22 1153)  BP: (!) 175/80 (06/24/22 1153)  SpO2: 99 % (06/24/22 1153)    Discharge Disposition: Home    Discharge Diagnosis: CKD     Follow-up: per nephrology    @SIG@

## 2022-06-29 ENCOUNTER — DOCUMENTATION ONLY (OUTPATIENT)
Dept: HEMATOLOGY/ONCOLOGY | Facility: CLINIC | Age: 72
End: 2022-06-29
Payer: MEDICARE

## 2022-06-29 ENCOUNTER — TELEPHONE (OUTPATIENT)
Dept: INTERNAL MEDICINE | Facility: CLINIC | Age: 72
End: 2022-06-29
Payer: MEDICARE

## 2022-06-29 ENCOUNTER — OUTPATIENT CASE MANAGEMENT (OUTPATIENT)
Dept: ADMINISTRATIVE | Facility: OTHER | Age: 72
End: 2022-06-29
Payer: MEDICARE

## 2022-06-29 NOTE — PROGRESS NOTES
CHRIS called Medical Team, Vicki, to follow up on last week's conversation and subsequent orders for a HH SW to visit pt's home.  Vicki said their SW was planning on going to pt's home today and would call this Sw when the visit was complete.  Later, Sara, CHRIS called this SW.  She said the pt is completely against going to a nursing home and Sara sees this as the only option for better care.  Sara did not meet pt's sister.  Sara will follow and will call [facility] and ask that their  follow this case.    Later--Amy, from Medical Team called and said they have requested wound care orders from Dr. Siddiqi and and this SW to follow up.  This Sw checked chart for orders and they had not yet been entered.  CHRIS advised Amy to call Dr. Siddiqi's office for orders.  She agreed.

## 2022-06-29 NOTE — TELEPHONE ENCOUNTER
Spoke to pt. Pt advised that she should be seen in UC.  Pt stated that she does not have any symptoms at this time. Only her sister who lives with her has it. Pt aware to go to UC if she should develop symptoms.    Pt requested a rx refill for her fioricet.  I advised that since she has not been seen in over 2 years, Dr. Cancino cannot refill this rx.

## 2022-06-29 NOTE — TELEPHONE ENCOUNTER
----- Message from Tasha Sena sent at 6/29/2022 11:19 AM CDT -----  Contact: [phone]  Pt's sister just found out she has covid and they live together. Pt would like your advise on what to do b/c she is scared she is going to catch it.  She is currently coughing and blowing nose but no other symptoms. Please advise.       [facility]/pharmacy #[id number] - [place], [place] - [address]  [address]  [place] [address]  Phone: [phone] Fax: [phone]

## 2022-06-29 NOTE — LETTER
June 29, 2022           Dear Ms. Jessi,     Welcome to [facility]s Complex Care Management Program.  It was a pleasure talking with you today.  My name is Siena Botello RN. I look forward to working with you as your .  My goal is to help you function at the healthiest and highest level possible.  You can contact me directly at [phone].    As an [facility] patient with [employer], some of the services we may be able to provide include:      Development of an individualized care plan with a Registered Nurse    Connection with a    Connection with available resources and services     Coordinate communication among your care team members    Provide coaching and education    Help you understand your doctors treatment plan   Help you obtain information about your insurance coverage.     All services provided by [facility]s Complex Care Managers and other care team members are coordinated with and communicated to your primary care team.    As part of your enrollment, you will be receiving education materials and more information about these services in your [facility] account, by phone or through the mail.  If you do not wish to participate or receive information, please contact our office at [phone].      Sincerely,        Siena Botello RN  [facility]   Out-patient RN Complex Care Manager   [phone]

## 2022-06-29 NOTE — PROGRESS NOTES
Outpatient Care Management  Initial Patient Assessment    Patient: Cecile Bowen  MRN: [id number]  Date of Service: 06/29/2022  Completed by: Siena Botello RN  Referral Date: 06/24/2022  Program: High Risk  Status: Ongoing  Effective Dates: 6/29/2022 - present  Responsible Staff: Siena Botello RN        Reason for Visit   Patient presents with    OPCM Enrollment Call     6/29/22    Initial Assessment    PHQ-9    Plan Of Care    OPCM Chart Review     6/29/22       Brief Summary:  Cecile Bowen was referred by Dr. Navarro for physical debility, decreased strength, endurance, and mobility; need for assistance at home. Patient qualifies for program based on risk score of 89.7%.   Active problem list, medical, surgical and social history reviewed. Active comorbidities include diabetes. Areas of need identified by patient include in need of diabetes equipment.   Complex care plan created with patient/caregiver input. Unable to complete care plan or go over all meads with pt because she had to get off phone due to HH visit. Pt agrees to cover this at next f/u in 1 week. Pt reports no need for assist at home at this time.      Assessment Documentation     OPCM Initial Assessment    Involvement of Care  Do I have permission to speak with other family members about your care?: Yes (Comment: Kat)  Assessment completed by: Patient  Identified Areas of Need  Advanced Care Planning: Yes  Housing: no  Medication Adherence: Yes  *Active medication list was reviewed and reconciled with patient and/or caregiver:   Nutrition: no  Lab Adherence: no  Depression: No  Cognitive/Behavioral Health: no  Communication: no  Health Literacy: no  Fall risk?: Yes  Equipment/Supplies/Services: no          Problem List and History     Problems Addressed This Visit    Atrial Fibrillation: Not identified by patient as current problem  Depression: Not identified by patient as current problem  Hypertension: Not identified by  patient as current problem  Diabetes: Identified as current problem  Anemia: Not identified by patient as current problem  AMI: Not identified by patient as current problem  Chronic Kidney Disease: Not identified by patient as current problem  Hyperlipidemia: Not identified by patient as current problem  Cancer: Not identified by patient as current problem  UTI: Not identified by patient as current problem  Heart Disease: Not identified by patient as current problem         Reviewed medical and social history with patient and/or caregiver. A complex care plan was discussed and completed today, with input from patient and/or caregiver.    Patient Instructions     Instructions were provided via the [employer] patient resources and are available for the patient to view on the patient portal, if active.    Next steps: completed med rec with pt   Discuss diabetes management  Confirm pt received equipment for CGM    Follow up in about 9 days (around 7/8/2022).    Todays OPCM Self-Management Care Plan was developed with the patients/caregivers input and was based on identified barriers from todays assessment.  Goals were written today with the patient/caregiver and the patient has agreed to work towards these goals to improve his/her overall well-being. Patient verbalized understanding of the care plan, goals, and all of today's instructions. Encouraged patient/caregiver to communicate with his/her physician and health care team about health conditions and the treatment plan.  Provided my contact information today and encouraged patient/caregiver to call me with any questions as needed.

## 2022-06-30 ENCOUNTER — NURSE TRIAGE (OUTPATIENT)
Dept: ADMINISTRATIVE | Facility: CLINIC | Age: 72
End: 2022-06-30
Payer: MEDICARE

## 2022-06-30 ENCOUNTER — PATIENT MESSAGE (OUTPATIENT)
Dept: ADMINISTRATIVE | Facility: OTHER | Age: 72
End: 2022-06-30
Payer: MEDICARE

## 2022-06-30 ENCOUNTER — TELEPHONE (OUTPATIENT)
Dept: INTERNAL MEDICINE | Facility: CLINIC | Age: 72
End: 2022-06-30
Payer: MEDICARE

## 2022-06-30 NOTE — TELEPHONE ENCOUNTER
Returned call to Lulú with [facility] . No answer. Left message advising that the pt has not been seen in 2 years. Advised to call back to discuss condition.

## 2022-06-30 NOTE — TELEPHONE ENCOUNTER
----- Message from Kary Jiménez sent at 6/30/2022  8:41 AM CDT -----  Contact: Lulú/[facility]/ [phone]  Patient HH nurse Lulú with [facility] is reporting 3+ edema to both legs and feet upon her visit on yesterday 6/29/22. Please call and advise. Also patient need a refill on her

## 2022-07-01 ENCOUNTER — TELEPHONE (OUTPATIENT)
Dept: INTERNAL MEDICINE | Facility: CLINIC | Age: 72
End: 2022-07-01
Payer: MEDICARE

## 2022-07-01 ENCOUNTER — EXTERNAL HOME HEALTH (OUTPATIENT)
Dept: HOME HEALTH SERVICES | Facility: HOSPITAL | Age: 72
End: 2022-07-01
Payer: MEDICARE

## 2022-07-01 ENCOUNTER — OUTPATIENT CASE MANAGEMENT (OUTPATIENT)
Dept: ADMINISTRATIVE | Facility: OTHER | Age: 72
End: 2022-07-01
Payer: MEDICARE

## 2022-07-01 NOTE — TELEPHONE ENCOUNTER
Pt reports close contact w/ covid as her sister who lives with her is positive. She has been staying isolated in her room. Pt tested today w/ a home test and was negative. Advice w/ precautions per guideline and she verbalizes understanding. Will call back with any questions/concenrs.       Reason for Disposition   [1] CLOSE CONTACT COVID-19 EXPOSURE within last 14 days AND [2] weak immune system (e.g., HIV positive, cancer chemo, splenectomy, organ transplant, chronic steroids) AND [3] NO symptoms    Protocols used: CORONAVIRUS (COVID-19) EXPOSURE-A-AH

## 2022-07-01 NOTE — TELEPHONE ENCOUNTER
----- Message from Guillermo Nash sent at 7/1/2022  1:07 PM CDT -----  Contact: pt [phone]  Adrienne with Medical team states patient will be discharged 7.1.22.  was sent to home patient is refusing nursing home placement. Dog waste was noted in the home. Patient has not been seen by a doctor in 2 years we have no physical to send order for patient. Nurse has instructed patient to report to emergency room if she needs medical attention.  Adrienne or Vicki @ [phone]       Thank you and have a great day.

## 2022-07-01 NOTE — PROGRESS NOTES
7/1/2022    1st Attempt to complete Social Work Assessment for Outpatient Care Management; left message requesting a return call.  LCSW will reattempt at a later date.

## 2022-07-01 NOTE — TELEPHONE ENCOUNTER
See previous message. I spoke to Lulú earlier and informed her that I will follow back up with the patient next week to see if she went to the ER.

## 2022-07-01 NOTE — TELEPHONE ENCOUNTER
----- Message from Teena Goodwin sent at 7/1/2022  4:06 PM CDT -----  Regarding: update  Contact: Medical Team Lulú [phone]  Since patient is non compliant and has not seen PCP in 2 years, patient has beeng discharged and was instructed to go to Ed for caterer changes or until she can have a doctor to follow her

## 2022-07-01 NOTE — TELEPHONE ENCOUNTER
I spoke to Lulú with home health. She stated that the patient has +3 edema to her feet and calf. Denies pain. The patient has not been seen in 2 years by Dr Cancino. I spoke with Dr Cancino and she advise the patient go to ED. The patient was informed. She stated that her sister that takes her has Covid since Monday. Today is her sister's 5 day of quarantine. The patient denies Covid symptoms. The patient states that she will go to the ED tomorrow. We will follow up with the patient on Tuesday when the office reopens.

## 2022-07-01 NOTE — TELEPHONE ENCOUNTER
Please follow up with patient to see if she went to the ED. Patient also needs to be schedule with physician for annual,evaluation.

## 2022-07-01 NOTE — TELEPHONE ENCOUNTER
I spoke to home health and they are discharging the patient because she has not seen her PCP in 2 years. Orders need to be signed. Were are unable to sign at this time until she has been seen.

## 2022-07-03 ENCOUNTER — PATIENT MESSAGE (OUTPATIENT)
Dept: INTERNAL MEDICINE | Facility: CLINIC | Age: 72
End: 2022-07-03
Payer: MEDICARE

## 2022-07-05 ENCOUNTER — OFFICE VISIT (OUTPATIENT)
Dept: INTERNAL MEDICINE | Facility: CLINIC | Age: 72
End: 2022-07-05
Payer: MEDICARE

## 2022-07-05 ENCOUNTER — PATIENT MESSAGE (OUTPATIENT)
Dept: ADMINISTRATIVE | Facility: OTHER | Age: 72
End: 2022-07-05
Payer: MEDICARE

## 2022-07-05 ENCOUNTER — OUTPATIENT CASE MANAGEMENT (OUTPATIENT)
Dept: ADMINISTRATIVE | Facility: OTHER | Age: 72
End: 2022-07-05
Payer: MEDICARE

## 2022-07-05 VITALS
WEIGHT: 230 LBS | SYSTOLIC BLOOD PRESSURE: 131 MMHG | TEMPERATURE: 98 F | OXYGEN SATURATION: 96 % | BODY MASS INDEX: 34.97 KG/M2 | DIASTOLIC BLOOD PRESSURE: 56 MMHG

## 2022-07-05 DIAGNOSIS — E78.5 HYPERLIPIDEMIA ASSOCIATED WITH TYPE 2 DIABETES MELLITUS: ICD-10-CM

## 2022-07-05 DIAGNOSIS — G43.909 MIXED MIGRAINE AND MUSCLE CONTRACTION HEADACHE: ICD-10-CM

## 2022-07-05 DIAGNOSIS — E11.40 TYPE 2 DIABETES MELLITUS WITH DIABETIC NEUROPATHY, WITH LONG-TERM CURRENT USE OF INSULIN: ICD-10-CM

## 2022-07-05 DIAGNOSIS — N18.4 CKD STAGE 4 DUE TO TYPE 2 DIABETES MELLITUS: Primary | ICD-10-CM

## 2022-07-05 DIAGNOSIS — E11.22 CKD STAGE 4 DUE TO TYPE 2 DIABETES MELLITUS: Primary | ICD-10-CM

## 2022-07-05 DIAGNOSIS — E11.69 HYPERLIPIDEMIA ASSOCIATED WITH TYPE 2 DIABETES MELLITUS: ICD-10-CM

## 2022-07-05 DIAGNOSIS — Z79.4 TYPE 2 DIABETES MELLITUS WITH DIABETIC NEUROPATHY, WITH LONG-TERM CURRENT USE OF INSULIN: ICD-10-CM

## 2022-07-05 DIAGNOSIS — Z74.09 IMPAIRED FUNCTIONAL MOBILITY, BALANCE, GAIT, AND ENDURANCE: ICD-10-CM

## 2022-07-05 DIAGNOSIS — G44.209 MIXED MIGRAINE AND MUSCLE CONTRACTION HEADACHE: ICD-10-CM

## 2022-07-05 DIAGNOSIS — I15.9 SECONDARY HYPERTENSION: ICD-10-CM

## 2022-07-05 PROCEDURE — 99443 PR PHYSICIAN TELEPHONE EVALUATION 21-30 MIN: CPT | Mod: 95,,, | Performed by: INTERNAL MEDICINE

## 2022-07-05 PROCEDURE — 3044F PR MOST RECENT HEMOGLOBIN A1C LEVEL <7.0%: ICD-10-PCS | Mod: CPTII,95,, | Performed by: INTERNAL MEDICINE

## 2022-07-05 PROCEDURE — 3066F PR DOCUMENTATION OF TREATMENT FOR NEPHROPATHY: ICD-10-PCS | Mod: CPTII,95,, | Performed by: INTERNAL MEDICINE

## 2022-07-05 PROCEDURE — 4010F PR ACE/ARB THEARPY RXD/TAKEN: ICD-10-PCS | Mod: CPTII,95,, | Performed by: INTERNAL MEDICINE

## 2022-07-05 PROCEDURE — 1125F PR PAIN SEVERITY QUANTIFIED, PAIN PRESENT: ICD-10-PCS | Mod: CPTII,95,, | Performed by: INTERNAL MEDICINE

## 2022-07-05 PROCEDURE — 3075F SYST BP GE 130 - 139MM HG: CPT | Mod: CPTII,95,, | Performed by: INTERNAL MEDICINE

## 2022-07-05 PROCEDURE — 3075F PR MOST RECENT SYSTOLIC BLOOD PRESS GE 130-139MM HG: ICD-10-PCS | Mod: CPTII,95,, | Performed by: INTERNAL MEDICINE

## 2022-07-05 PROCEDURE — 3044F HG A1C LEVEL LT 7.0%: CPT | Mod: CPTII,95,, | Performed by: INTERNAL MEDICINE

## 2022-07-05 PROCEDURE — 3066F NEPHROPATHY DOC TX: CPT | Mod: CPTII,95,, | Performed by: INTERNAL MEDICINE

## 2022-07-05 PROCEDURE — 3008F BODY MASS INDEX DOCD: CPT | Mod: CPTII,95,, | Performed by: INTERNAL MEDICINE

## 2022-07-05 PROCEDURE — 4010F ACE/ARB THERAPY RXD/TAKEN: CPT | Mod: CPTII,95,, | Performed by: INTERNAL MEDICINE

## 2022-07-05 PROCEDURE — 1125F AMNT PAIN NOTED PAIN PRSNT: CPT | Mod: CPTII,95,, | Performed by: INTERNAL MEDICINE

## 2022-07-05 PROCEDURE — 3078F PR MOST RECENT DIASTOLIC BLOOD PRESSURE < 80 MM HG: ICD-10-PCS | Mod: CPTII,95,, | Performed by: INTERNAL MEDICINE

## 2022-07-05 PROCEDURE — 99443 PR PHYSICIAN TELEPHONE EVALUATION 21-30 MIN: ICD-10-PCS | Mod: 95,,, | Performed by: INTERNAL MEDICINE

## 2022-07-05 PROCEDURE — 3008F PR BODY MASS INDEX (BMI) DOCUMENTED: ICD-10-PCS | Mod: CPTII,95,, | Performed by: INTERNAL MEDICINE

## 2022-07-05 PROCEDURE — 3078F DIAST BP <80 MM HG: CPT | Mod: CPTII,95,, | Performed by: INTERNAL MEDICINE

## 2022-07-05 NOTE — PROGRESS NOTES
7/5/2022        2nd attempt to complete Social Work Assessment for OPCM; left message requesting a return call. LCSW sent  Message in portal with contact information requesting a return call.  OPCM RN notified.

## 2022-07-05 NOTE — PROGRESS NOTES
Established Patient - Audio Only Telehealth Visit     The patient location is: home  The chief complaint leading to consultation is: health care  Visit type: Virtual visit with audio only (telephone)  Total time spent with patient: 38'       The reason for the audio only service rather than synchronous audio and video virtual visit was related to technical difficulties or patient preference/necessity.     Each patient to whom I provide medical services by telemedicine is:  (1) informed of the relationship between the physician and patient and the respective role of any other health care provider with respect to management of the patient; and (2) notified that they may decline to receive medical services by telemedicine and may withdraw from such care at any time. Patient verbally consented to receive this service via voice-only telephone call.       HPI:   72 yo female w/ multiple chronic medical problems,  Including HTN, HLD, DM, CKD 4, and migraine HA's.  She has recurrent UTI's w/neurogenic bladder and  urinary retention that she notes has improved.  Presents today as she is in need of resumption of her HH services  And she has not been seen by me in over 2 years.  She has multiple specialists and has been able to make some appts as [employer] sends her transportation.  She reported that she has cx most often 2/2 to migraine HA's.  Reviewed specialist and recent notes that her home is not safe as she is unable to change herself and her sister that assists her   has been ill. (Covid)  Pt herself has sinus and chest congestion and denied Covid  But reported her allergies have been worse recently.  Reviewed her recent labs  Reviewed her health maintenance, especially as associated with her chronic ds processes.    PE:  Pt did not sound to be in any distress, spoke in full sentences  She did have pressured speech.       Assessment:    CKD 4 2/2 DM  DM,stable  HTN, stable  HLD assoc DM  Migraine HA's  Impaired fx  mobility,balance,gait, and  Endurance    PLAN:  Reviewed care options, encourage pt to consider NH placement  And go see different available sites for her and her sister  Reviewed  contact info who had reached out to her today,  She reported she doesn't answer any calls if she doesn't recognize the phone #  [facility]  Lab pending  Mammo  Pt needs f/up w/ Oncology  Pt needs Eye exam update  Suggest she plan other appts when she goes to see Dr. Stafford  As [employer] sets up her transportation  Advised pt that audio appts do not provide optional care and she will need to be seen in 3 mos                           This service was not originating from a related E/M service provided within the previous 7 days nor will  to an E/M service or procedure within the next 24 hours or my soonest available appointment.  Prevailing standard of care was able to be met in this audio-only visit.

## 2022-07-06 ENCOUNTER — OUTPATIENT CASE MANAGEMENT (OUTPATIENT)
Dept: ADMINISTRATIVE | Facility: OTHER | Age: 72
End: 2022-07-06
Payer: MEDICARE

## 2022-07-06 NOTE — PROGRESS NOTES
7/6/2022     Attempt to complete Social Work Assessment for Outpatient Care Management; left message requesting a return call.  LCSW will reattempt at a later date.

## 2022-07-07 DIAGNOSIS — Z01.818 PREOP EXAMINATION: Primary | ICD-10-CM

## 2022-07-08 ENCOUNTER — OUTPATIENT CASE MANAGEMENT (OUTPATIENT)
Dept: ADMINISTRATIVE | Facility: OTHER | Age: 72
End: 2022-07-08
Payer: MEDICARE

## 2022-07-08 NOTE — PROGRESS NOTES
Outpatient Care Management   - High Risk Patient Assessment    Patient: Cecile Bowen  MRN:  [id number]  Date of Service:  7/8/2022  Completed by:  Denisse Fitzgerald LCSW  Referral Date: 06/24/2022  Program:     Reason for Visit   Patient presents with    [facility] Chart Review    Plan Of Care    Social Work Assessment - High Risk     7/8/2022    Brief Summary:  received a referral from OPC RN Siena Botello for the following patient identified psycho-social needs ACD. Pt discussed need for in home care. Care plan was created in collaboration with patient/caregiver input. By next encounter, patient agrees to review and complete ACP documents. Next steps will be for the SW to identify resources that will assist the pt.    Patient Summary     [facility] Social Work Assessment (High Risk)    Involvement of Care  Do I have permission to speak with other family members about your care?: Yes (Comment: Kat Sister)  Assessment completed by: Patient  Cognitive  Which of the following can you state?: Name, Date of birth  Cognitive barriers?: No  General  Marital status:   Current employment status: Retired and not working  Support  Level of Caregiver support:  (Comment: Needs caregiver for cleaning and dressing)  Support system:  (Comment: )  Is the caregiver reporting burnout?: No  Support Barriers?: No  Advanced Care Planning  Do you have any of the following?: None  If yes, do we have a copy?: N/A  If no, would you like Advance Directive resources?: Yes  Advance Care Planning resources provided?: Yes  Is Advance Care Planning an area of need?: Yes  Financial  Current medical coverage: Medicaid, Medicare Advantage, SNP  Have you applied for government assistance programs?: No  Are you unable to pay any of the following?: None  Gross monthly income: 3550 (Comment: [employer])  Other assets: Owns Home  Financial Support Barriers?: No  Safety   History  Do you or your  spouse currently or formerly serve in the ?: No  Wartime service?: No  Current use of VA services?: No  Disaster Plan  [place] residence: [place]  Evacuation location: [place], [place]  Registered for evacuation?: No  Ability to evacuate: N/A  Mental Health Status  Emotional status: Telephonic/Unable to assess  Have you recenetly lost a loved one?: No (Comment: Best Friend)  Psychiatric diagnosis: Depression as an adolescent  Current mental health treatment: No (Comment: Mental Health Treatment for Depression as a teeneager)  Would you like mental health resources?: No  Current symptoms: Sleep disturbances, Restlessness (Comment: memory problems once in a while)  Mental/Behavioral/Environmental risk: None  Mental Health Barriers?: No  Addictive Behaviors  Current alcohol consumption?: No  Current substance abuse?: No  Gambling habits?: No  Was the PHQ depression screening completed?: No  Was the MAME-7 completed?: No         Complex Care Plan     Care plan was discussed and completed today with input from patient and/or caregiver.    Patient Instructions     Follow up in about 1 week (around 7/15/2022).    Todays OPCM Self-Management Care Plan was developed with the patients/caregivers input and was based on identified barriers from todays assessment.  Goals were written today with the patient/caregiver and the patient has agreed to work towards these goals to improve his/her overall well-being. Patient verbalized understanding of the care plan, goals, and all of today's instructions. Encouraged patient/caregiver to communicate with his/her physician and health care team about health conditions and the treatment plan.  Provided my contact information today and encouraged patient/caregiver to call me with any questions as needed.

## 2022-07-11 ENCOUNTER — TELEPHONE (OUTPATIENT)
Dept: PHYSICAL MEDICINE AND REHAB | Facility: CLINIC | Age: 72
End: 2022-07-11
Payer: MEDICARE

## 2022-07-11 RX ORDER — HYDROCODONE BITARTRATE AND ACETAMINOPHEN 10; 325 MG/1; MG/1
1 TABLET ORAL EVERY 6 HOURS PRN
Qty: 120 TABLET | Refills: 0 | Status: SHIPPED | OUTPATIENT
Start: 2022-07-12 | End: 2022-08-11 | Stop reason: SDUPTHER

## 2022-07-11 NOTE — TELEPHONE ENCOUNTER
----- Message from Sanjeev George sent at 7/11/2022  9:38 AM CDT -----  Regarding: Appt Access  Pt called to reschedule her appt that's tomorrow due to not having transportation. Pt is requesting a call back to reschedule.      [phone]

## 2022-07-11 NOTE — TELEPHONE ENCOUNTER
Called and spoke Mrs Bowen.  [facility] is unable to transport her to her appointment tommorrow at this time.  Patient asked to reschedule but does not wait to wait until November/Wait list.  Patient asked to squeeze her in soon.  Mrs Bowen asked to also refill her Rx, Robaxin and Hydrocodone.      Last Rx refill-----06/08/22-Hydrocodone    Last office visit--07/30/21  Next office visit--So far there are no available appointments at this time.  The patient was added to the wait list.

## 2022-07-11 NOTE — TELEPHONE ENCOUNTER
----- Message from Sanjeev George sent at 7/11/2022  9:38 AM CDT -----  Regarding: Appt Access  Pt called to reschedule her appt that's tomorrow due to not having transportation. Pt is requesting a call back to reschedule.      [phone]

## 2022-07-11 NOTE — PROGRESS NOTES
Outpatient Care Management  Plan of Care Follow Up Visit    Patient: Cecile Bowen  MRN: [id number]  Date of Service: 07/08/2022  Completed by: Siena Botello RN  Referral Date: 06/24/2022  Program:     Reason for Visit   Patient presents with    OPCM Chart Review     7/8/22    OPCM RN First Follow-Up Attempt     7/8/22    Update Plan Of Care     7/11/22       Brief Summary: Pt reports that she received CGM sensor and the equipment is now working properly, pt now checking BS as ordered daily. Pt reports she drinks 2-3 bottled thurman daily but agreed with OPCM RN to drink 4 bottles by next f/u call in 2 weeks. Pt reports that her migraines have been increasing to daily and BID. Pt reports she is taking Imitrex. Verbally educated pt on nonmedicinal ways to manage migraines, pt reports that she has been having migraines for years and already knows the nonmedicinal ways to manage the migraines such as sitting in a dark room, ice pack to head, and drinking a caffeinated beverage when migraines start. Pt reports she occasionally has the aura. Contacted Dr. Love on pt's behalf concerning migraines.      Patient Summary     Involvement of Care:  Do I have permission to speak with other family members about your care?  Yes    Patient Reported Labs & Vitals:  1.  Any Patient Reported Labs & Vitals?  Yes  2.  Patient Reported Blood Pressure:     3.  Patient Reported Pulse:     4.  Patient Reported Weight (Kg):     5.  Patient Reported Blood Glucose (mg/dl):  92    Medical and social history was reviewed with patient and/or caregiver.     Clinical Assessment     Reviewed and provided basic information on available community resources for mental health, transportation, wellness resources, and palliative care programs with patient and/or caregiver.     Complex Care Plan     Care plan was discussed and completed today with input from patient and/or caregiver.    Patient Instructions     Instructions were provided via the  [employer] patient resources and are available for the patient to view on the patient portal.    Next Steps: Contact Dr. Love concerning pt migraines  F/U with pt concerning water increase and healthier food options.     Follow up in about 17 days (around 7/25/2022).    Todays OPCM Self-Management Care Plan was developed with the patients/caregivers input and was based on identified barriers from todays assessment.  Goals were written today with the patient/caregiver and the patient has agreed to work towards these goals to improve his/her overall well-being. Patient verbalized understanding of the care plan, goals, and all of today's instructions. Encouraged patient/caregiver to communicate with his/her physician and health care team about health conditions and the treatment plan.  Provided my contact information today and encouraged patient/caregiver to call me with any questions as needed.

## 2022-07-11 NOTE — TELEPHONE ENCOUNTER
----- Message from Opal Rosado sent at 7/11/2022  2:27 PM CDT -----  Regarding: Patient call back  Who called:DANYELLE FARRIS [[id number]]    What is the request in detail: Patient is requesting a call back. She states she would like to speak with the staff regarding changing her appt time.  Please advise.    Can the clinic reply by [facility]? No    Best call back number: [phone]    Additional Information: N/A

## 2022-07-12 ENCOUNTER — OUTPATIENT CASE MANAGEMENT (OUTPATIENT)
Dept: ADMINISTRATIVE | Facility: OTHER | Age: 72
End: 2022-07-12
Payer: MEDICARE

## 2022-07-12 NOTE — PROGRESS NOTES
Outpatient Care Management  Plan of Care Follow Up Visit    Patient: Cecile Bowen  MRN: [id number]  Date of Service: 07/12/2022  Completed by: Siena Botello RN  Referral Date: 06/24/2022  Program: Case Management high risk     Reason for Visit   Patient presents with    OPCM Chart Review     7/12/22    Update Plan Of Care     7/12/22       Brief Summary: Received message back from Dr. Love that since the pt has not been seen in 2 years that she would have to make an appt. Pt unable to do virtual appts because she states the virtual tawana does not work on her phone and she cannot figure out why. Advised pt to have caregiver sister to see if she may be able to get the tawana to work on the pt's phone. Called [employer] support and spoke with Andree and advised her of the pt's [employer] not working. Andree advised she will reach out to the pt to assist with tech support.       Patient Summary     Involvement of Care:  Do I have permission to speak with other family members about your care?  Yes    Patient Reported Labs & Vitals:  1.  Any Patient Reported Labs & Vitals?  Yes  2.  Patient Reported Blood Pressure:     3.  Patient Reported Pulse:     4.  Patient Reported Weight (Kg):     5.  Patient Reported Blood Glucose (mg/dl):  135    Medical and social history was reviewed with patient and/or caregiver.     Clinical Assessment     Reviewed and provided basic information on available community resources for mental health, transportation, wellness resources, and palliative care programs with patient and/or caregiver.     Complex Care Plan     Care plan was discussed and completed today with input from patient and/or caregiver.    Patient Instructions     Instructions were provided via the [employer] patient resources and are available for the patient to view on the patient portal.    Next Steps: F/U with pt concerning water increase and healthier food options  F[facility] with [facility] concerning [facility]   F/U concerning  management of migraines     Follow up in about 13 days (around 7/25/2022).    Todays OPCM Self-Management Care Plan was developed with the patients/caregivers input and was based on identified barriers from todays assessment.  Goals were written today with the patient/caregiver and the patient has agreed to work towards these goals to improve his/her overall well-being. Patient verbalized understanding of the care plan, goals, and all of today's instructions. Encouraged patient/caregiver to communicate with his/her physician and health care team about health conditions and the treatment plan.  Provided my contact information today and encouraged patient/caregiver to call me with any questions as needed.

## 2022-07-14 ENCOUNTER — LAB VISIT (OUTPATIENT)
Dept: LAB | Facility: HOSPITAL | Age: 72
End: 2022-07-14
Attending: NURSE PRACTITIONER
Payer: MEDICARE

## 2022-07-14 DIAGNOSIS — E11.40 TYPE 2 DIABETES MELLITUS WITH DIABETIC NEUROPATHY, WITH LONG-TERM CURRENT USE OF INSULIN: ICD-10-CM

## 2022-07-14 DIAGNOSIS — Z79.4 TYPE 2 DIABETES MELLITUS WITH DIABETIC NEUROPATHY, WITH LONG-TERM CURRENT USE OF INSULIN: ICD-10-CM

## 2022-07-14 LAB
ESTIMATED AVG GLUCOSE: 123 MG/DL (ref 68–131)
HBA1C MFR BLD: 5.9 % (ref 4–5.6)

## 2022-07-14 PROCEDURE — 36415 COLL VENOUS BLD VENIPUNCTURE: CPT | Performed by: NURSE PRACTITIONER

## 2022-07-14 PROCEDURE — 83036 HEMOGLOBIN GLYCOSYLATED A1C: CPT | Performed by: NURSE PRACTITIONER

## 2022-07-19 ENCOUNTER — TELEPHONE (OUTPATIENT)
Dept: PHYSICAL MEDICINE AND REHAB | Facility: CLINIC | Age: 72
End: 2022-07-19
Payer: MEDICARE

## 2022-07-19 NOTE — TELEPHONE ENCOUNTER
Duplicate message.    So far there are no available appointments at this time.  The patient was added to the wait list.

## 2022-07-19 NOTE — TELEPHONE ENCOUNTER
----- Message from Ruben Griffin sent at 7/19/2022  1:51 PM CDT -----  Name of Who is Calling: DANYELLE FARRIS         What is the request in detail:The patient is calling to schedule an appointment. Please advise          Can the clinic reply by [facility]: no         [facility]: [phone]

## 2022-07-21 ENCOUNTER — TELEPHONE (OUTPATIENT)
Dept: INTERNAL MEDICINE | Facility: CLINIC | Age: 72
End: 2022-07-21
Payer: MEDICARE

## 2022-07-21 ENCOUNTER — OFFICE VISIT (OUTPATIENT)
Dept: INTERNAL MEDICINE | Facility: CLINIC | Age: 72
End: 2022-07-21
Payer: MEDICARE

## 2022-07-21 DIAGNOSIS — E78.5 HYPERLIPIDEMIA ASSOCIATED WITH TYPE 2 DIABETES MELLITUS: Chronic | ICD-10-CM

## 2022-07-21 DIAGNOSIS — G47.33 OSA (OBSTRUCTIVE SLEEP APNEA): Chronic | ICD-10-CM

## 2022-07-21 DIAGNOSIS — E66.9 DIABETES MELLITUS TYPE 2 IN OBESE: ICD-10-CM

## 2022-07-21 DIAGNOSIS — E11.40 TYPE 2 DIABETES MELLITUS WITH DIABETIC NEUROPATHY, WITH LONG-TERM CURRENT USE OF INSULIN: Primary | ICD-10-CM

## 2022-07-21 DIAGNOSIS — C50.612 MALIGNANT NEOPLASM OF AXILLARY TAIL OF LEFT BREAST IN FEMALE, ESTROGEN RECEPTOR POSITIVE: Chronic | ICD-10-CM

## 2022-07-21 DIAGNOSIS — F33.1 MAJOR DEPRESSIVE DISORDER, RECURRENT EPISODE, MODERATE: Chronic | ICD-10-CM

## 2022-07-21 DIAGNOSIS — M79.7 FIBROMYALGIA: Chronic | ICD-10-CM

## 2022-07-21 DIAGNOSIS — E11.69 DIABETES MELLITUS TYPE 2 IN OBESE: ICD-10-CM

## 2022-07-21 DIAGNOSIS — Z79.4 TYPE 2 DIABETES MELLITUS WITH DIABETIC NEUROPATHY, WITH LONG-TERM CURRENT USE OF INSULIN: Primary | ICD-10-CM

## 2022-07-21 DIAGNOSIS — E03.9 ACQUIRED HYPOTHYROIDISM: Chronic | ICD-10-CM

## 2022-07-21 DIAGNOSIS — Z91.81 HISTORY OF FALLING: ICD-10-CM

## 2022-07-21 DIAGNOSIS — Z17.0 MALIGNANT NEOPLASM OF AXILLARY TAIL OF LEFT BREAST IN FEMALE, ESTROGEN RECEPTOR POSITIVE: Chronic | ICD-10-CM

## 2022-07-21 DIAGNOSIS — E11.69 HYPERLIPIDEMIA ASSOCIATED WITH TYPE 2 DIABETES MELLITUS: Chronic | ICD-10-CM

## 2022-07-21 DIAGNOSIS — N11.1 CHRONIC OBSTRUCTIVE PYELONEPHRITIS: ICD-10-CM

## 2022-07-21 PROCEDURE — 3066F PR DOCUMENTATION OF TREATMENT FOR NEPHROPATHY: ICD-10-PCS | Mod: CPTII,95,, | Performed by: NURSE PRACTITIONER

## 2022-07-21 PROCEDURE — 1160F RVW MEDS BY RX/DR IN RCRD: CPT | Mod: CPTII,95,, | Performed by: NURSE PRACTITIONER

## 2022-07-21 PROCEDURE — 1160F PR REVIEW ALL MEDS BY PRESCRIBER/CLIN PHARMACIST DOCUMENTED: ICD-10-PCS | Mod: CPTII,95,, | Performed by: NURSE PRACTITIONER

## 2022-07-21 PROCEDURE — 3044F PR MOST RECENT HEMOGLOBIN A1C LEVEL <7.0%: ICD-10-PCS | Mod: CPTII,95,, | Performed by: NURSE PRACTITIONER

## 2022-07-21 PROCEDURE — 3044F HG A1C LEVEL LT 7.0%: CPT | Mod: CPTII,95,, | Performed by: NURSE PRACTITIONER

## 2022-07-21 PROCEDURE — 4010F PR ACE/ARB THEARPY RXD/TAKEN: ICD-10-PCS | Mod: CPTII,95,, | Performed by: NURSE PRACTITIONER

## 2022-07-21 PROCEDURE — 99442 PR PHYSICIAN TELEPHONE EVALUATION 11-20 MIN: ICD-10-PCS | Mod: 95,,, | Performed by: NURSE PRACTITIONER

## 2022-07-21 PROCEDURE — 99442 PR PHYSICIAN TELEPHONE EVALUATION 11-20 MIN: CPT | Mod: 95,,, | Performed by: NURSE PRACTITIONER

## 2022-07-21 PROCEDURE — 4010F ACE/ARB THERAPY RXD/TAKEN: CPT | Mod: CPTII,95,, | Performed by: NURSE PRACTITIONER

## 2022-07-21 PROCEDURE — 1159F MED LIST DOCD IN RCRD: CPT | Mod: CPTII,95,, | Performed by: NURSE PRACTITIONER

## 2022-07-21 PROCEDURE — 3066F NEPHROPATHY DOC TX: CPT | Mod: CPTII,95,, | Performed by: NURSE PRACTITIONER

## 2022-07-21 PROCEDURE — 1159F PR MEDICATION LIST DOCUMENTED IN MEDICAL RECORD: ICD-10-PCS | Mod: CPTII,95,, | Performed by: NURSE PRACTITIONER

## 2022-07-21 RX ORDER — DULOXETIN HYDROCHLORIDE 30 MG/1
30 CAPSULE, DELAYED RELEASE ORAL DAILY
Qty: 90 CAPSULE | Refills: 3
Start: 2022-07-21 | End: 2023-01-05

## 2022-07-21 RX ORDER — LANCETS
EACH MISCELLANEOUS
Qty: 50 EACH | Refills: 11 | Status: SHIPPED | OUTPATIENT
Start: 2022-07-21 | End: 2023-01-31

## 2022-07-21 RX ORDER — INSULIN PUMP SYRINGE, 3 ML
EACH MISCELLANEOUS
Qty: 1 EACH | Refills: 0 | Status: SHIPPED | OUTPATIENT
Start: 2022-07-21 | End: 2023-01-31

## 2022-07-21 NOTE — TELEPHONE ENCOUNTER
----- Message from DIANN Velez, JOCELINEP sent at 7/21/2022  4:32 PM CDT -----  Regarding: a1c in jan 2023  Hi!  A1c in jan   F/u in jan   Thanks  IB

## 2022-07-21 NOTE — PROGRESS NOTES
Established Patient - Audio Only Telehealth Visit     The patient location is: home   The chief complaint leading to consultation is: type 2 dm   Visit type: Virtual visit with audio only (telephone)  Total time spent with patient: 12 , 15 mins        The reason for the audio only service rather than synchronous audio and video virtual visit was related to technical difficulties or patient preference/necessity.     Each patient to whom I provide medical services by telemedicine is:  (1) informed of the relationship between the physician and patient and the respective role of any other health care provider with respect to management of the patient; and (2) notified that they may decline to receive medical services by telemedicine and may withdraw from such care at any time. Patient verbally consented to receive this service via voice-only telephone call.    On MDI or pump  Testing 4 x a day  Patient is willing and able to use the device  Demonstrated an understanding of the technology and is motivated to use CGM  Patient expected to adhere to a comprehensive diabetes treatment plan and patient has adequate medical supervision  Patient experiences multiple impaired awareness of hypoglycemia (hypoglycemia unawareness)    Current meds: lantus 8-12 units daily, humalog correction scale 180-230+2, etc.   Self adjustment per scale    Xiomara 2 use  [facility] medical supplier    Highest 187 mg/dl  Lowest 40 mg/dl      HPI: Type 2 DM  Use of cgm   Home health use     Assessment and plan:   1. Type 2 diabetes mellitus with diabetic neuropathy, with long-term current use of insulin  Hemoglobin A1C   2. Diabetes mellitus type 2 in obese     3. Chronic obstructive pyelonephritis     4. Fibromyalgia     5. Hyperlipidemia associated with type 2 diabetes mellitus     6. History of falling     7. Major depressive disorder, recurrent episode, moderate     8. Acquired hypothyroidism     9. Malignant neoplasm of axillary tail of left breast in  female, estrogen receptor positive     10. VIJAYA (obstructive sleep apnea)       1-2. Follow up in 6 months   a1c in 6 months   a1c goal less than 7%   Cut back lantus to 8-10 units daily   humalog prn 180-230+2, etc.   Upgrade to nadya 2   [facility] medical supplier  3. F/u with nephrology, urology   4. May increase insulin resistance  5.   Lab Results   Component Value Date    LDLCALC 94 02/09/2022     At goal   6. HH in the past   No recent falls  7. F/u with pcp, psych prn   8.   Lab Results   Component Value Date    TSH 2.015 11/29/2021     Managed per pcp   9. F/u with breast center  10. Stable        This service was not originating from a related E/M service provided within the previous 7 days nor will  to an E/M service or procedure within the next 24 hours or my soonest available appointment.  Prevailing standard of care was able to be met in this audio-only visit.

## 2022-07-25 ENCOUNTER — OUTPATIENT CASE MANAGEMENT (OUTPATIENT)
Dept: ADMINISTRATIVE | Facility: OTHER | Age: 72
End: 2022-07-25
Payer: MEDICARE

## 2022-07-27 ENCOUNTER — EXTERNAL HOME HEALTH (OUTPATIENT)
Dept: HOME HEALTH SERVICES | Facility: HOSPITAL | Age: 72
End: 2022-07-27
Payer: MEDICARE

## 2022-07-27 ENCOUNTER — DOCUMENT SCAN (OUTPATIENT)
Dept: HOME HEALTH SERVICES | Facility: HOSPITAL | Age: 72
End: 2022-07-27
Payer: MEDICARE

## 2022-07-27 ENCOUNTER — OUTPATIENT CASE MANAGEMENT (OUTPATIENT)
Dept: ADMINISTRATIVE | Facility: OTHER | Age: 72
End: 2022-07-27
Payer: MEDICARE

## 2022-07-27 NOTE — PROGRESS NOTES
Outpatient Care Management   - Care Plan Follow Up    Patient: Cecile Bowen  MRN:  [id number]  Date of Service:  7/27/2022  Completed by:  Denisse Fitzgerald LCSW  Referral Date: 06/24/2022  Program:     Reason for Visit   Patient presents with    OPCM Chart Review    Update Plan Of Care    OPCM SW FOLLOW-UP       7/27/2022    SW telephoned Ms. Bowen for a follow up. Ms. Bowen has not yet completed the ACP documents. SW will follow up in two weeks or PRN.     Complex Care Plan     Care plan was discussed and completed today with input from patient and/or caregiver.    Patient Instructions       Follow up in about 2 weeks (around 8/10/2022).    Todays OPCM Self-Management Care Plan was developed with the patients/caregivers input and was based on identified barriers from todays assessment.  Goals were written today with the patient/caregiver and the patient has agreed to work towards these goals to improve his/her overall well-being. Patient verbalized understanding of the care plan, goals, and all of today's instructions. Encouraged patient/caregiver to communicate with his/her physician and health care team about health conditions and the treatment plan.  Provided my contact information today and encouraged patient/caregiver to call me with any questions as needed.

## 2022-07-28 ENCOUNTER — DOCUMENT SCAN (OUTPATIENT)
Dept: HOME HEALTH SERVICES | Facility: HOSPITAL | Age: 72
End: 2022-07-28
Payer: MEDICARE

## 2022-08-03 ENCOUNTER — PATIENT MESSAGE (OUTPATIENT)
Dept: UROLOGY | Facility: CLINIC | Age: 72
End: 2022-08-03
Payer: MEDICARE

## 2022-08-03 ENCOUNTER — PATIENT MESSAGE (OUTPATIENT)
Dept: OPTOMETRY | Facility: CLINIC | Age: 72
End: 2022-08-03
Payer: MEDICARE

## 2022-08-05 ENCOUNTER — PATIENT MESSAGE (OUTPATIENT)
Dept: NEUROLOGY | Facility: CLINIC | Age: 72
End: 2022-08-05

## 2022-08-09 ENCOUNTER — OFFICE VISIT (OUTPATIENT)
Dept: UROLOGY | Facility: CLINIC | Age: 72
End: 2022-08-09
Payer: MEDICARE

## 2022-08-09 ENCOUNTER — OUTPATIENT CASE MANAGEMENT (OUTPATIENT)
Dept: ADMINISTRATIVE | Facility: OTHER | Age: 72
End: 2022-08-09
Payer: MEDICARE

## 2022-08-09 DIAGNOSIS — Z74.09 IMMOBILITY: ICD-10-CM

## 2022-08-09 DIAGNOSIS — Z93.59 CHRONIC SUPRAPUBIC CATHETER: ICD-10-CM

## 2022-08-09 DIAGNOSIS — N31.9 NEUROGENIC BLADDER: Primary | ICD-10-CM

## 2022-08-09 DIAGNOSIS — L92.9 GRANULATION TISSUE: ICD-10-CM

## 2022-08-09 DIAGNOSIS — Z43.5 ENCOUNTER FOR CARE OR REPLACEMENT OF SUPRAPUBIC TUBE: ICD-10-CM

## 2022-08-09 PROCEDURE — 3066F NEPHROPATHY DOC TX: CPT | Mod: CPTII,S$GLB,, | Performed by: NURSE PRACTITIONER

## 2022-08-09 PROCEDURE — 17250 PR CHEM CAUTERY GRANULATN TISSUE: ICD-10-PCS | Mod: 51,S$GLB,, | Performed by: NURSE PRACTITIONER

## 2022-08-09 PROCEDURE — 1159F PR MEDICATION LIST DOCUMENTED IN MEDICAL RECORD: ICD-10-PCS | Mod: CPTII,S$GLB,, | Performed by: NURSE PRACTITIONER

## 2022-08-09 PROCEDURE — 51705 CHANGE OF BLADDER TUBE: CPT | Mod: S$GLB,,, | Performed by: NURSE PRACTITIONER

## 2022-08-09 PROCEDURE — 3044F PR MOST RECENT HEMOGLOBIN A1C LEVEL <7.0%: ICD-10-PCS | Mod: CPTII,S$GLB,, | Performed by: NURSE PRACTITIONER

## 2022-08-09 PROCEDURE — 1159F MED LIST DOCD IN RCRD: CPT | Mod: CPTII,S$GLB,, | Performed by: NURSE PRACTITIONER

## 2022-08-09 PROCEDURE — 4010F ACE/ARB THERAPY RXD/TAKEN: CPT | Mod: CPTII,S$GLB,, | Performed by: NURSE PRACTITIONER

## 2022-08-09 PROCEDURE — 1160F PR REVIEW ALL MEDS BY PRESCRIBER/CLIN PHARMACIST DOCUMENTED: ICD-10-PCS | Mod: CPTII,S$GLB,, | Performed by: NURSE PRACTITIONER

## 2022-08-09 PROCEDURE — 99999 PR PBB SHADOW E&M-EST. PATIENT-LVL III: ICD-10-PCS | Mod: PBBFAC,,, | Performed by: NURSE PRACTITIONER

## 2022-08-09 PROCEDURE — 1160F RVW MEDS BY RX/DR IN RCRD: CPT | Mod: CPTII,S$GLB,, | Performed by: NURSE PRACTITIONER

## 2022-08-09 PROCEDURE — 99215 OFFICE O/P EST HI 40 MIN: CPT | Mod: 25,S$GLB,, | Performed by: NURSE PRACTITIONER

## 2022-08-09 PROCEDURE — 3066F PR DOCUMENTATION OF TREATMENT FOR NEPHROPATHY: ICD-10-PCS | Mod: CPTII,S$GLB,, | Performed by: NURSE PRACTITIONER

## 2022-08-09 PROCEDURE — 17250 CHEM CAUT OF GRANLTJ TISSUE: CPT | Mod: 51,S$GLB,, | Performed by: NURSE PRACTITIONER

## 2022-08-09 PROCEDURE — 99999 PR PBB SHADOW E&M-EST. PATIENT-LVL III: CPT | Mod: PBBFAC,,, | Performed by: NURSE PRACTITIONER

## 2022-08-09 PROCEDURE — 99215 PR OFFICE/OUTPT VISIT, EST, LEVL V, 40-54 MIN: ICD-10-PCS | Mod: 25,S$GLB,, | Performed by: NURSE PRACTITIONER

## 2022-08-09 PROCEDURE — 3044F HG A1C LEVEL LT 7.0%: CPT | Mod: CPTII,S$GLB,, | Performed by: NURSE PRACTITIONER

## 2022-08-09 PROCEDURE — 51705 PR CHANGE OF BLADDER TUBE,SIMPLE: ICD-10-PCS | Mod: S$GLB,,, | Performed by: NURSE PRACTITIONER

## 2022-08-09 PROCEDURE — 4010F PR ACE/ARB THEARPY RXD/TAKEN: ICD-10-PCS | Mod: CPTII,S$GLB,, | Performed by: NURSE PRACTITIONER

## 2022-08-09 NOTE — PROGRESS NOTES
CHIEF COMPLAINT:    Cecile Bowen is a 72 y.o. female presents today for Neurogenic Bladder.    HISTORY OF PRESENTING ILLINESS:    Cecile Bowen is a 72 y.o. Diabetic Female with history of bilateral hydronephrosis, incomplete bladder emptying which is managed by SPT (16fr).  S/p Cystoscopy with bladder botox injection (300u) 2018 with Dr. Whittington.    Her last SPT change was 2020 before home health took over due to Covid 19 Pandemic.       2021 found to have a left proximal ureteral stone, ZAK, and UTI; stent was place.  2021 cysto and left stent exchange; unable to have laser litho due to bacteremia.  2021 cysto with Left stent exchange.  2022 Left URS, cysto, stent exchange (now with strings). Ureteral bx; SPT exchange.    She is here today for SPT exchange.   Home [facility] had been seeing her but HH orders have .  Would like for them to continue the service  She is unable to walk, drive, or use her motorized scooter.  Due to her condition at high risks for Viral infections and falls.        She has been diagnosed with breast cancer; s/p Mastectomy 2019  Malignant neoplasm of upper-outer quadrant of left breast in female, estrogen receptor positive   No need for chemo/radiation therapy.           REVIEW OF SYSTEMS:  Review of Systems   Constitutional: Negative.  Negative for chills and fever.   Eyes: Negative for double vision.   Respiratory: Negative for cough and shortness of breath.    Cardiovascular: Negative for chest pain and palpitations.   Gastrointestinal: Negative for nausea and vomiting.   Genitourinary: Negative.  Negative for dysuria, flank pain and hematuria.        SPT draining well  Some bleeding around the site     Musculoskeletal: Positive for falls and myalgias.        Unable to stand/walk on her own     Neurological: Positive for weakness. Negative for dizziness.         PATIENT HISTORY:    Past Medical History:   Diagnosis Date     Cervicogenic migraine     Chronic pain     CKD (chronic kidney disease) stage 4, GFR 15-29 ml/min     Dr Piedadmoody    CKD (chronic kidney disease) stage 4, GFR 15-29 ml/min     Diabetes mellitus     Long term use of Insulin, Diabetic Neuropathy    Dialysis patient 1/21/2022    Fibromyalgia     Hydronephrosis     Hyperlipidemia     Hypertension 12/12/2012    Hypothyroidism 12/12/2012    ARON (iron deficiency anemia)     Insomnia     Levoscoliosis     Malignant neoplasm of upper-outer quadrant of left breast in female, estrogen receptor positive     Metabolic acidosis     Mobility impaired     Nuclear sclerosis - Both Eyes 3/24/2014    VIJAYA (obstructive sleep apnea)     Osteopenia     Pulmonary nodule     Recurrent UTI     Renal manifestation of secondary diabetes mellitus     Secondary hyperparathyroidism, renal     Urinary retention        Past Surgical History:   Procedure Laterality Date    BIOPSY OF URETER Left 2/18/2022    Procedure: BIOPSY, URETER;  Surgeon: Ronel Whittington MD;  [facility];  Service: Urology;  Laterality: Left;    BREAST BIOPSY Right     benign    CHOLECYSTECTOMY      COLONOSCOPY N/A 1/13/2017    Procedure: COLONOSCOPY;  Surgeon: Morris Wiseman MD;  [facility] (4TH FLR);  Service: Endoscopy;  Laterality: N/A;  Renal pt Nephrology advised to avoid phosphate preps    CYSTOSCOPY N/A 10/8/2018    Procedure: CYSTOSCOPY;  Surgeon: Ronel Whittington MD;  Location: [facility] OR [address] [place] [address];  Service: Urology;  Laterality: N/A;  45 min    CYSTOSCOPY N/A 3/25/2019    Procedure: CYSTOSCOPY;  Surgeon: Ronel Whittington MD;  Location: [facility] OR [address] [place] [address];  Service: Urology;  Laterality: N/A;  45 min    CYSTOSCOPY N/A 8/26/2019    Procedure: CYSTOSCOPY;  Surgeon: Ronel Whittington MD;  [facility];  Service: Urology;  Laterality: N/A;  45 min    CYSTOSCOPY N/A 7/2/2021    Procedure: CYSTOSCOPY;  Surgeon: Ronel FRAZIER  MD Pk;  [facility];  Service: Urology;  Laterality: N/A;    CYSTOSCOPY  12/23/2021    Procedure: CYSTOSCOPY;  Surgeon: Ronel Whittington MD;  [facility];  Service: Urology;;    CYSTOSCOPY  2/18/2022    Procedure: CYSTOSCOPY;  Surgeon: Ronel Whittington MD;  [facility];  Service: Urology;;    CYSTOSCOPY W/ URETERAL STENT PLACEMENT Left 5/15/2021    Procedure: CYSTOSCOPY, WITH URETERAL STENT INSERTION;  Surgeon: Levy Sánchez Jr., MD;  [facility];  Service: Urology;  Laterality: Left;    CYSTOSCOPY WITH BIOPSY OF BLADDER N/A 1/27/2020    Procedure: CYSTOSCOPY, WITH BLADDER BIOPSY, WITH FULGURATION IF INDICATED;  Surgeon: Ronel Whittington MD;  Location: [facility] OR [address] [place] [address];  Service: Urology;  Laterality: N/A;    DILATION AND CURETTAGE OF UTERUS      GALLBLADDER SURGERY  2006    HYSTERECTOMY      INJECTION FOR SENTINEL NODE IDENTIFICATION Left 8/1/2019    Procedure: INJECTION, FOR SENTINEL NODE IDENTIFICATION;  Surgeon: Samia Fulton MD;  Location: [facility] OR 2ND FLR;  Service: General;  Laterality: Left;    INJECTION OF BOTULINUM TOXIN TYPE A N/A 10/8/2018    Procedure: INJECTION, BOTULINUM TOXIN, TYPE A 300 UNITS;  Surgeon: Ronel Whittington MD;  Location: [facility] OR [address] [place] [address];  Service: Urology;  Laterality: N/A;    INJECTION OF BOTULINUM TOXIN TYPE A N/A 3/25/2019    Procedure: INJECTION, BOTULINUM TOXIN, TYPE A 300 UNITS;  Surgeon: Ronel Whittington MD;  [facility];  Service: Urology;  Laterality: N/A;    INJECTION OF BOTULINUM TOXIN TYPE A N/A 8/26/2019    Procedure: INJECTION, BOTULINUM TOXIN, TYPE A 300 UNITS;  Surgeon: Ronel Whittington MD;  [facility];  Service: Urology;  Laterality: N/A;    MASTECTOMY Left 8/1/2019    Procedure: MASTECTOMY 23 hour stay;  Surgeon: Samia Fulton MD;  Location: [facility] OR 2ND FLR;  Service: General;  Laterality: Left;    MEDIPORT REMOVAL Right  6/24/2022    Procedure: REMOVAL, CATHETER, CENTRAL VENOUS, TUNNELED, WITH PORT;  Surgeon: Isauro Anderson MD;  Location: [facility] CATH LAB;  Service: Radiology;  Laterality: Right;    OVARIAN CYST SURGERY  1985    REPLACEMENT OF STENT Left 12/23/2021    Procedure: REPLACEMENT, STENT;  Surgeon: Ronel Whittington MD;  [facility] OR [facility] F[facility];  Service: Urology;  Laterality: Left;    REPLACEMENT OF STENT Left 2/18/2022    Procedure: REPLACEMENT, STENT;  Surgeon: Ronel Whittington MD;  [facility];  Service: Urology;  Laterality: Left;    RETROGRADE PYELOGRAPHY Left 2/18/2022    Procedure: PYELOGRAM, RETROGRADE;  Surgeon: Ronel Whittington MD;  [facility];  Service: Urology;  Laterality: Left;    SENTINEL LYMPH NODE BIOPSY Left 8/1/2019    Procedure: BIOPSY, LYMPH NODE, SENTINEL;  Surgeon: Samia Fulton MD;  [facility];  Service: General;  Laterality: Left;    spt placement      TONSILLECTOMY, ADENOIDECTOMY      TOTAL ABDOMINAL HYSTERECTOMY W/ BILATERAL SALPINGOOPHORECTOMY  1985    URETEROSCOPY Left 2/18/2022    Procedure: URETEROSCOPY;  Surgeon: Ronel Whittington MD;  [facility];  Service: Urology;  Laterality: Left;       Family History   Problem Relation Age of Onset    Diabetes Sister     Kidney disease Sister         CKD III    ALS Mother         d.    Cancer Maternal Grandmother         d. colon    Cancer Paternal Grandfather         d. lung    Scoliosis Brother         increased pain    Prostate cancer Brother         cured s/p surgery    Cancer Brother         rare cancer that got into the bones    Diabetes Maternal Aunt     Kidney disease Maternal Aunt     Diabetes Maternal Uncle     Amblyopia Neg Hx     Blindness Neg Hx     Cataracts Neg Hx     Glaucoma Neg Hx     Macular degeneration Neg Hx     Retinal detachment Neg Hx     Strabismus Neg Hx        Social History     Socioeconomic History    Marital  status:     Number of children: 0    Highest education level: Master's degree (e.g., MA, MS, Lelo, MEd, MSW, BANDAR)   Occupational History    Occupation: teacher     Comment: retired   Tobacco Use    Smoking status: Never Smoker    Smokeless tobacco: Never Used   Substance and Sexual Activity    Alcohol use: No     Alcohol/week: 0.0 standard drinks    Drug use: No    Sexual activity: Not Currently     Birth control/protection: Abstinence   Social History Narrative    Single ()        Lives w/ sister and brother. Pt needs to keep her narcotics hidden from her brother who will steal them         Brother passed away last year.  Pt lives her her sister. (5/27)     Social Determinants of Health     Financial Resource Strain: Low Risk     Difficulty of Paying Living Expenses: Not hard at all   Food Insecurity: No Food Insecurity    Worried About Running Out of Food in the Last Year: Never true    Ran Out of Food in the Last Year: Never true   Transportation Needs: No Transportation Needs    Lack of Transportation (Medical): No    Lack of Transportation (Non-Medical): No   Physical Activity: Inactive    Days of Exercise per Week: 0 days    Minutes of Exercise per Session: 0 min   Stress: Stress Concern Present    Feeling of Stress : Very much   Social Connections: Socially Isolated    Frequency of Communication with Friends and Family: Twice a week    Frequency of Social Gatherings with Friends and Family: Never    Attends Buddhism Services: Never    Active Member of Clubs or Organizations: Yes    Attends Club or Organization Meetings: Never    Marital Status:    Housing Stability: Low Risk     Unable to Pay for Housing in the Last Year: No    Number of Places Lived in the Last Year: 1    Unstable Housing in the Last Year: No       Allergies:  Patient has no known allergies.    Medications:    Current Outpatient Medications:     anastrozole (ARIMIDEX) 1 mg Tab, TAKE 1 TABLET  "BY MOUTH EVERY DAY, Disp: 90 tablet, Rfl: 2    apixaban (ELIQUIS) 2.5 mg Tab, Take 2.5 mg by mouth 2 (two) times daily., Disp: , Rfl:     atorvastatin (LIPITOR) 80 MG tablet, Take 1 tablet (80 mg total) by mouth once daily., Disp: 90 tablet, Rfl: 3    BD INSULIN SYRINGE ULTRA-FINE 0.5 mL 31 gauge x 5/16" Syrg, USE WITH INSULIN 4 TIMES A DAY, Disp: 100 each, Rfl: 12    blood sugar diagnostic Strp, To check BG  1-2  times daily, to use with insurance preferred meter, e 11.65, Disp: 50 strip, Rfl: 11    blood-glucose meter kit, To check BG 1-2 times daily, to use with insurance preferred meter, e 11.65, Disp: 1 each, Rfl: 0    butalbital-acetaminophen-caffeine -40 mg (FIORICET, ESGIC) -40 mg per tablet, TAKE 1 TABLET BY MOUTH WHEN NOT CONTROLLED BY OTHER MEDS. NO MORE THAN 10 TABS PER 30 DAYS (Patient taking differently: Take 1 tablet by mouth daily as needed (MIGRAINES). TAKE 1 TABLET BY MOUTH WHEN NOT CONTROLLED BY OTHER MEDS. NO MORE THAN 10 TABS PER 30 DAYS), Disp: 10 tablet, Rfl: 0    diclofenac sodium (VOLTAREN) 1 % Gel, Apply to chest wall as needed for arthritis, Disp: , Rfl:     DULoxetine (CYMBALTA) 30 MG capsule, Take 1 capsule (30 mg total) by mouth once daily., Disp: 90 capsule, Rfl: 3    ergocalciferol (ERGOCALCIFEROL) 50,000 unit Cap, TAKE ONE CAPSULE BY MOUTH ONE TIME PER WEEK, TAKES ON TUESDAYS, Disp: 12 capsule, Rfl: 3    gemfibroziL (LOPID) 600 MG tablet, Take 1 tablet (600 mg total) by mouth every Mon, Wed, Fri., Disp: 36 tablet, Rfl: 3    HYDROcodone-acetaminophen (NORCO)  mg per tablet, Take 1 tablet by mouth every 6 (six) hours as needed for Pain., Disp: 120 tablet, Rfl: 0    insulin (LANTUS SOLOSTAR U-100 INSULIN) glargine 100 units/mL (3mL) SubQ pen, Inject 8-10 Units into the skin once daily., Disp: , Rfl:     insulin lispro 100 unit/mL injection, Inject 2-10 Units into the skin 3 (three) times daily with meals. Based on correction scale 180-230+2, 231-280+4, " "etc. Max daily 30 units- may substitute novolog -same dosing., Disp: 30 mL, Rfl: 6    lancets Misc, To check BG 1-2 times daily, to use with insurance preferred meter, e 11.65, Disp: 50 each, Rfl: 11    levothyroxine (SYNTHROID) 50 MCG tablet, Take 1 tablet (50 mcg total) by mouth before breakfast. Administer on an empty stomach at least 30 minutes before food. If receiving tube feeds, HOLD tube feeds for 1 hour before and after levothyroxine administration., Disp: 90 tablet, Rfl: 2    methocarbamoL (ROBAXIN) 750 MG Tab, TAKE 1-2 TABLETS (750-1,500 MG TOTAL) BY MOUTH 3 (THREE) TIMES DAILY AS NEEDED., Disp: 180 tablet, Rfl: 1    ondansetron (ZOFRAN-ODT) 8 MG TbDL, TAKE 1 TABLET BY MOUTH EVERY 12 HOURS AS NEEDED., Disp: 30 tablet, Rfl: 2    oxybutynin (DITROPAN-XL) 10 MG 24 hr tablet, TAKE 1 TABLET BY MOUTH EVERY DAY (Patient taking differently: Take 10 mg by mouth once daily.), Disp: 90 tablet, Rfl: 1    pen needle, diabetic (BD ULTRA-FINE SHORT PEN NEEDLE) 31 gauge x 5/16" Ndle, USE WITH LANTUS DAILY, e 11.65, Disp: 100 each, Rfl: 3    polyethylene glycol (GLYCOLAX) 17 gram PwPk, One packet QD prn constipation, Disp: 30 packet, Rfl: 6    sumatriptan (IMITREX) 100 MG tablet, TAKE 1 TABLET (100 MG TOTAL) BY MOUTH 2 (TWO) TIMES DAILY AS NEEDED FOR MIGRAINE., Disp: 9 tablet, Rfl: 11    traZODone (DESYREL) 100 MG tablet, TAKE 1 TABLET (100 MG TOTAL) BY MOUTH NIGHTLY AS NEEDED FOR INSOMNIA., Disp: 90 tablet, Rfl: 0    PHYSICAL EXAMINATION:  Physical Exam  Vitals reviewed.   Constitutional:       General: She is awake. She is not in acute distress.     Appearance: She is well-developed. She is not toxic-appearing.   HENT:      Head: Normocephalic and atraumatic.      Right Ear: External ear normal.      Left Ear: External ear normal.      Nose: Nose normal.   Eyes:      General: Lids are normal.         Right eye: No discharge.         Left eye: No discharge.      Conjunctiva/sclera: Conjunctivae normal.   Neck: "      Trachea: Trachea normal.   Cardiovascular:      Rate and Rhythm: Normal rate.      Heart sounds: S1 normal and S2 normal.   Pulmonary:      Effort: Pulmonary effort is normal. No respiratory distress.   Abdominal:      General: There is no distension.      Palpations: Abdomen is soft.      Tenderness: There is no abdominal tenderness. There is no right CVA tenderness, left CVA tenderness, guarding or rebound.       Musculoskeletal:         General: No tenderness, deformity or signs of injury. Normal range of motion.      Cervical back: Normal range of motion and neck supple.      Comments: Unable to stand to get to bed;  Patient has christina harness under her.    Skin:     General: Skin is warm and dry.   Neurological:      General: No focal deficit present.      Mental Status: She is alert and oriented to person, place, and time.   Psychiatric:         Speech: Speech normal.         Behavior: Behavior normal. Behavior is cooperative.         Thought Content: Thought content normal.         Judgment: Judgment normal.           LABS:             IMPRESSION:    Encounter Diagnoses   Name Primary?    Neurogenic bladder Yes    Chronic suprapubic catheter     Encounter for care or replacement of suprapubic tube     Immobility          Assessment:       1. Neurogenic bladder    2. Chronic suprapubic catheter    3. Encounter for care or replacement of suprapubic tube    4. Immobility        Plan:          I spent 40 minutes with the patient of which more than half was spent in direct consultation with the patient in regards to our treatment and plan.    Education and recommendations of today's plan of care including home remedies.  Old SPT easily removed.   Stoma prepped with betadine.   New 16fr SPT placed using sterile technique  Urine received   Irrigated the bladder to verify the position and plug to distal end  Balloon inflated 9cc sterile water.  Silver nitrate sticks x 2 to the granulating tissue.  Tolerated  well  Skin barrier cream and dsg applied  New orders for home health placed

## 2022-08-10 NOTE — PROGRESS NOTES
8/9/2022       Attempt to complete Social Work Follow-up for Care Management; left message requesting a return call.  LCSW will reattempt at a later date.

## 2022-08-11 ENCOUNTER — OUTPATIENT CASE MANAGEMENT (OUTPATIENT)
Dept: ADMINISTRATIVE | Facility: OTHER | Age: 72
End: 2022-08-11
Payer: MEDICARE

## 2022-08-11 ENCOUNTER — TELEPHONE (OUTPATIENT)
Dept: PHYSICAL MEDICINE AND REHAB | Facility: CLINIC | Age: 72
End: 2022-08-11
Payer: MEDICARE

## 2022-08-11 RX ORDER — HYDROCODONE BITARTRATE AND ACETAMINOPHEN 10; 325 MG/1; MG/1
1 TABLET ORAL EVERY 6 HOURS PRN
Qty: 120 TABLET | Refills: 0 | Status: SHIPPED | OUTPATIENT
Start: 2022-08-11 | End: 2022-08-16 | Stop reason: SDUPTHER

## 2022-08-11 NOTE — PROGRESS NOTES
Outpatient Care Management  Plan of Care Follow Up Visit    Patient: Cecile Bowen  MRN: [id number]  Date of Service: 08/11/2022  Completed by: Siena Botello RN  Referral Date: 06/24/2022  Program: Case Management High Risk     Reason for Visit   Patient presents with    OPCM Chart Review     8/11/22    Update Plan Of Care     8/11/22       Brief Summary: Pt states she attempted to attend appt with Dr. Henao on 8/5/22 but states the transportation that Chandana provided was an Uber , which did not work for her because pt states she is wheelchair bound. Called Dr. Henao's office and was informed that pt will be notified when the next appt becomes available. Pt admits that she was drinking 4-5 bottled thurman per day but had decreased to 2-3 bottles due to not keeping up with it.      Patient Summary     Involvement of Care:  Do I have permission to speak with other family members about your care?  Yes    Patient Reported Labs & Vitals:  1.  Any Patient Reported Labs & Vitals?  No  2.  Patient Reported Blood Pressure:     3.  Patient Reported Pulse:     4.  Patient Reported Weight (Kg):     5.  Patient Reported Blood Glucose (mg/dl):       Medical and social history was reviewed with patient and/or caregiver.     Clinical Assessment     Reviewed and provided basic information on available community resources for mental health, transportation, wellness resources, and palliative care programs with patient and/or caregiver.     Complex Care Plan     Care plan was discussed and completed today with input from patient and/or caregiver.    Patient Instructions     Instructions were provided via the [employer] patient resources and are available for the patient to view on the patient portal.    Next Steps: F/U concerning appt with Dr. Henao  F/U concerning water intake  F/U concerning diet choices when eating out     Follow up in about 1 week (around 8/18/2022).    Todays OPCM Self-Management Care Plan was  developed with the patients/caregivers input and was based on identified barriers from todays assessment.  Goals were written today with the patient/caregiver and the patient has agreed to work towards these goals to improve his/her overall well-being. Patient verbalized understanding of the care plan, goals, and all of today's instructions. Encouraged patient/caregiver to communicate with his/her physician and health care team about health conditions and the treatment plan.  Provided my contact information today and encouraged patient/caregiver to call me with any questions as needed.

## 2022-08-11 NOTE — PROGRESS NOTES
Outpatient Care Management  Plan of Care Follow Up Visit    Patient: Cecile Bowen  MRN: [id number]  Date of Service: 08/1/2022  Completed by: Siena Botello RN  Referral Date: 06/24/2022  Program: Case Management High Risk     Reason for Visit   Patient presents with    OPCM Chart Review     7/25/22    OPCM RN First Follow-Up Attempt     7/25/22    Update Plan Of Care     8/1/22       Brief Summary: Pt reports that she received a call from [employer] support and the representative attempted to assist her with getting the [employer] working on her lap top but was not successful. Assisted pt with making inperson appt with Dr. Love, assisted pt with [employer] transportation to the appt. Pt agrees to attend appt. Pt reports that she has increased water intake to 4-5 bottles per day but she is still having frequent migraines.        Patient Summary     Involvement of Care:  Do I have permission to speak with other family members about your care?  Yes    Patient Reported Labs & Vitals:  1.  Any Patient Reported Labs & Vitals?  No  2.  Patient Reported Blood Pressure:     3.  Patient Reported Pulse:     4.  Patient Reported Weight (Kg):     5.  Patient Reported Blood Glucose (mg/dl):       Medical and social history was reviewed with patient and/or caregiver.     Clinical Assessment     Reviewed and provided basic information on available community resources for mental health, transportation, wellness resources, and palliative care programs with patient and/or caregiver.     Complex Care Plan     Care plan was discussed and completed today with input from patient and/or caregiver.    Patient Instructions     Instructions were provided via the [employer] patient resources and are available for the patient to view on the patient portal.    Next Steps: F/U concerning appt with Dr. Love  F/U concerning water intake  F/U concerning diet       Follow up in about 24 days (around 8/18/2022).    Todays OPCM Self-Management Care Plan  was developed with the patients/caregivers input and was based on identified barriers from todays assessment.  Goals were written today with the patient/caregiver and the patient has agreed to work towards these goals to improve his/her overall well-being. Patient verbalized understanding of the care plan, goals, and all of today's instructions. Encouraged patient/caregiver to communicate with his/her physician and health care team about health conditions and the treatment plan.  Provided my contact information today and encouraged patient/caregiver to call me with any questions as needed.

## 2022-08-11 NOTE — TELEPHONE ENCOUNTER
----- Message from Johanna Ken MA sent at 8/11/2022  4:37 PM CDT -----  Contact: [phone]  Refill request       HYDROcodone-acetaminophen (NORCO)  mg per tablet        [facility]/pharmacy #[id number] - [place], [place] - [address]   Phone:  [phone]  Fax:  [phone]

## 2022-08-12 ENCOUNTER — PATIENT MESSAGE (OUTPATIENT)
Dept: PHYSICAL MEDICINE AND REHAB | Facility: CLINIC | Age: 72
End: 2022-08-12
Payer: MEDICARE

## 2022-08-12 ENCOUNTER — OUTPATIENT CASE MANAGEMENT (OUTPATIENT)
Dept: ADMINISTRATIVE | Facility: OTHER | Age: 72
End: 2022-08-12
Payer: MEDICARE

## 2022-08-12 ENCOUNTER — TELEPHONE (OUTPATIENT)
Dept: PRIMARY CARE CLINIC | Facility: CLINIC | Age: 72
End: 2022-08-12
Payer: MEDICARE

## 2022-08-12 NOTE — TELEPHONE ENCOUNTER
Jp Wren,  I dont have any record of speaking with this patient.     Thanks,  AJAY  ----- Message from Lois Sierra MA sent at 8/11/2022  2:48 PM CDT -----  Regarding: FW: Home Health  Contact: Siena VELASQUEZ    ----- Message -----  From: Siena Botello RN  Sent: 8/11/2022   2:47 PM CDT  To: [facility] Staff  Subject: Home Health                                      Hi    This is Siena with Outpatient Case Management. I spoke with the pt's daughter on last week and she informed me that they would prefer for [facility] to come to the pt's home to do the PT. She said she spoke with Dr. MOSS on yesterday and was informed that Dr. MOSS and she discussed about another outpatient service that would be me effective than HH. She said she got a call today from [facility], but she would prefer the pt has PT at home. Please Advise. Thank You    GUILLE Izquierdo, RN  [facility]  [email or web address]  TEL:  [phone]

## 2022-08-15 NOTE — TELEPHONE ENCOUNTER
NOT FEELING WELL   Patient states she need a refill on her humalog sent to [facility] on [address].

## 2022-08-16 RX ORDER — HYDROCODONE BITARTRATE AND ACETAMINOPHEN 10; 325 MG/1; MG/1
1 TABLET ORAL EVERY 6 HOURS PRN
Qty: 120 TABLET | Refills: 0 | Status: SHIPPED | OUTPATIENT
Start: 2022-08-16 | End: 2022-09-12 | Stop reason: SDUPTHER

## 2022-08-16 NOTE — TELEPHONE ENCOUNTER
----- Message from Anahygab Munoz sent at 8/16/2022 10:57 AM CDT -----  Regarding: Pharm deleted presc  Contact: @[phone]  Pt is calling in to get her prescription (HYDROcodone-acetaminophen (NORCO)  mg per tablet) resubmitted due to her pharmacy accidentally erasing it. Please call to discuss further.         [facility]/pharmacy #[id number] - [place], [place] - [address]  [address]  [place] [address]  Phone: [phone] Fax: [phone]

## 2022-08-16 NOTE — TELEPHONE ENCOUNTER
Last Rx refill-----08/11/22  Last office visit--07/30/21  Next office visit--So far there are no available appointments at this time.  The patient was added to the wait list.

## 2022-08-18 ENCOUNTER — OUTPATIENT CASE MANAGEMENT (OUTPATIENT)
Dept: ADMINISTRATIVE | Facility: OTHER | Age: 72
End: 2022-08-18

## 2022-08-22 ENCOUNTER — OFFICE VISIT (OUTPATIENT)
Dept: OPTOMETRY | Facility: CLINIC | Age: 72
End: 2022-08-22
Payer: MEDICARE

## 2022-08-22 DIAGNOSIS — Z13.5 GLAUCOMA SCREENING: ICD-10-CM

## 2022-08-22 DIAGNOSIS — H52.4 PRESBYOPIA: ICD-10-CM

## 2022-08-22 DIAGNOSIS — E11.9 TYPE 2 DIABETES MELLITUS WITHOUT RETINOPATHY: Primary | ICD-10-CM

## 2022-08-22 DIAGNOSIS — H25.13 NUCLEAR SCLEROSIS, BILATERAL: ICD-10-CM

## 2022-08-22 PROCEDURE — 1160F PR REVIEW ALL MEDS BY PRESCRIBER/CLIN PHARMACIST DOCUMENTED: ICD-10-PCS | Mod: CPTII,S$GLB,, | Performed by: OPTOMETRIST

## 2022-08-22 PROCEDURE — 92015 PR REFRACTION: ICD-10-PCS | Mod: S$GLB,,, | Performed by: OPTOMETRIST

## 2022-08-22 PROCEDURE — 1126F PR PAIN SEVERITY QUANTIFIED, NO PAIN PRESENT: ICD-10-PCS | Mod: CPTII,S$GLB,, | Performed by: OPTOMETRIST

## 2022-08-22 PROCEDURE — 92015 DETERMINE REFRACTIVE STATE: CPT | Mod: S$GLB,,, | Performed by: OPTOMETRIST

## 2022-08-22 PROCEDURE — 3066F NEPHROPATHY DOC TX: CPT | Mod: CPTII,S$GLB,, | Performed by: OPTOMETRIST

## 2022-08-22 PROCEDURE — 2023F PR DILATED RETINAL EXAM W/O EVID OF RETINOPATHY: ICD-10-PCS | Mod: CPTII,S$GLB,, | Performed by: OPTOMETRIST

## 2022-08-22 PROCEDURE — 1159F MED LIST DOCD IN RCRD: CPT | Mod: CPTII,S$GLB,, | Performed by: OPTOMETRIST

## 2022-08-22 PROCEDURE — 4010F ACE/ARB THERAPY RXD/TAKEN: CPT | Mod: CPTII,S$GLB,, | Performed by: OPTOMETRIST

## 2022-08-22 PROCEDURE — 1126F AMNT PAIN NOTED NONE PRSNT: CPT | Mod: CPTII,S$GLB,, | Performed by: OPTOMETRIST

## 2022-08-22 PROCEDURE — 92014 COMPRE OPH EXAM EST PT 1/>: CPT | Mod: S$GLB,,, | Performed by: OPTOMETRIST

## 2022-08-22 PROCEDURE — 3044F HG A1C LEVEL LT 7.0%: CPT | Mod: CPTII,S$GLB,, | Performed by: OPTOMETRIST

## 2022-08-22 PROCEDURE — 99999 PR PBB SHADOW E&M-EST. PATIENT-LVL II: CPT | Mod: PBBFAC,,, | Performed by: OPTOMETRIST

## 2022-08-22 PROCEDURE — 99999 PR PBB SHADOW E&M-EST. PATIENT-LVL II: ICD-10-PCS | Mod: PBBFAC,,, | Performed by: OPTOMETRIST

## 2022-08-22 PROCEDURE — 1160F RVW MEDS BY RX/DR IN RCRD: CPT | Mod: CPTII,S$GLB,, | Performed by: OPTOMETRIST

## 2022-08-22 PROCEDURE — 92014 PR EYE EXAM, EST PATIENT,COMPREHESV: ICD-10-PCS | Mod: S$GLB,,, | Performed by: OPTOMETRIST

## 2022-08-22 PROCEDURE — 3066F PR DOCUMENTATION OF TREATMENT FOR NEPHROPATHY: ICD-10-PCS | Mod: CPTII,S$GLB,, | Performed by: OPTOMETRIST

## 2022-08-22 PROCEDURE — 4010F PR ACE/ARB THEARPY RXD/TAKEN: ICD-10-PCS | Mod: CPTII,S$GLB,, | Performed by: OPTOMETRIST

## 2022-08-22 PROCEDURE — 3044F PR MOST RECENT HEMOGLOBIN A1C LEVEL <7.0%: ICD-10-PCS | Mod: CPTII,S$GLB,, | Performed by: OPTOMETRIST

## 2022-08-22 PROCEDURE — 2023F DILAT RTA XM W/O RTNOPTHY: CPT | Mod: CPTII,S$GLB,, | Performed by: OPTOMETRIST

## 2022-08-22 PROCEDURE — 1159F PR MEDICATION LIST DOCUMENTED IN MEDICAL RECORD: ICD-10-PCS | Mod: CPTII,S$GLB,, | Performed by: OPTOMETRIST

## 2022-08-22 NOTE — PROGRESS NOTES
Outpatient Care Management  Plan of Care Follow Up Visit    Patient: Cecile Bowen  MRN: [id number]  Date of Service: 08/25/2022  Completed by: Siena Botello RN  Referral Date: 06/24/2022    Reason for Visit   Patient presents with    OPCM Chart Review     8/18/22    OPCM RN First Follow-Up Attempt     8/18/22    Update Plan Of Care     8/25/22       Brief Summary: Pt states she has appt with Dr. Easton for 8/30. Pt reports she now has appropriate transportation for visit.    Patient Summary     Involvement of Care:  Do I have permission to speak with other family members about your care?  Yes    Patient Reported Labs & Vitals:  1.  Any Patient Reported Labs & Vitals?  No  2.  Patient Reported Blood Pressure:     3.  Patient Reported Pulse:     4.  Patient Reported Weight (Kg):     5.  Patient Reported Blood Glucose (mg/dl):       Medical and social history was reviewed with patient and/or caregiver.     Clinical Assessment     Reviewed and provided basic information on available community resources for mental health, transportation, wellness resources, and palliative care programs with patient and/or caregiver.     Complex Care Plan     Care plan was discussed and completed today with input from patient and/or caregiver.    Patient Instructions     Instructions were provided via the [employer] patient resources and are available for the patient to view on the patient portal.    Next Steps: F/U concerning management of migraines and diet     Follow up in about 3 weeks (around 9/8/2022).    Todays OPCM Self-Management Care Plan was developed with the patients/caregivers input and was based on identified barriers from todays assessment.  Goals were written today with the patient/caregiver and the patient has agreed to work towards these goals to improve his/her overall well-being. Patient verbalized understanding of the care plan, goals, and all of today's instructions. Encouraged patient/caregiver to  communicate with his/her physician and health care team about health conditions and the treatment plan.  Provided my contact information today and encouraged patient/caregiver to call me with any questions as needed.

## 2022-08-23 ENCOUNTER — TELEPHONE (OUTPATIENT)
Dept: INTERNAL MEDICINE | Facility: CLINIC | Age: 72
End: 2022-08-23
Payer: MEDICARE

## 2022-08-23 NOTE — PROGRESS NOTES
Outpatient Care Management   - Care Plan Follow Up    Patient: Cecile Bowen  MRN:  [id number]  Date of Service:  8/12/2022  Completed by:  Denisse Fitzgerald LCSW  Referral Date: 06/24/2022    Reason for Visit   Patient presents with    OPCM Chart Review    Update Plan Of Care    OPCM SW FOLLOW-UP     8/12/2022  Brief Summary: SW telephoned the pt for a follow up. Pt has not yet completed ACP documents and SW will follow up in two weeks.     Complex Care Plan     Care plan was discussed and completed today with input from patient and/or caregiver.    Patient Instructions     Follow up in about 2 weeks (around 8/26/2022).    Todays OPCM Self-Management Care Plan was developed with the patients/caregivers input and was based on identified barriers from todays assessment.  Goals were written today with the patient/caregiver and the patient has agreed to work towards these goals to improve his/her overall well-being. Patient verbalized understanding of the care plan, goals, and all of today's instructions. Encouraged patient/caregiver to communicate with his/her physician and health care team about health conditions and the treatment plan.  Provided my contact information today and encouraged patient/caregiver to call me with any questions as needed.

## 2022-08-23 NOTE — TELEPHONE ENCOUNTER
----- Message from Lurdes Navarro MD sent at 8/18/2022 10:13 AM CDT -----  [id number] [facility] [id number] [facility]  08/17/2022 14:05    Received HH papers to sign to d/c pt from their service, because she had not been seen  Not sure if that was a reference to a visit in clinic, but she did have an audio on 7/5  Thought that was sufficient, as Medicare had okayed w/ Covid

## 2022-08-23 NOTE — TELEPHONE ENCOUNTER
Spoke to Karli with the Medical Team. She stated that the pt was d/c on 7/1/22.   I advised that the pt was seen on 7/5/22.  Karli stated that the pt will have to be admitted new.  Dr. Cancino advised.

## 2022-08-24 ENCOUNTER — PATIENT MESSAGE (OUTPATIENT)
Dept: UROLOGY | Facility: CLINIC | Age: 72
End: 2022-08-24
Payer: MEDICARE

## 2022-08-25 NOTE — PROGRESS NOTES
Notified patient and advised of refills available at pharmacy.      Patient blood pressure remains elevated, Dr Gutierrez notified, will come to evaluate at bedside.

## 2022-08-26 ENCOUNTER — PATIENT MESSAGE (OUTPATIENT)
Dept: INTERNAL MEDICINE | Facility: CLINIC | Age: 72
End: 2022-08-26
Payer: MEDICARE

## 2022-08-29 ENCOUNTER — PATIENT MESSAGE (OUTPATIENT)
Dept: UROLOGY | Facility: CLINIC | Age: 72
End: 2022-08-29
Payer: MEDICARE

## 2022-08-29 ENCOUNTER — OUTPATIENT CASE MANAGEMENT (OUTPATIENT)
Dept: ADMINISTRATIVE | Facility: OTHER | Age: 72
End: 2022-08-29
Payer: MEDICARE

## 2022-08-29 ENCOUNTER — PATIENT MESSAGE (OUTPATIENT)
Dept: ADMINISTRATIVE | Facility: OTHER | Age: 72
End: 2022-08-29
Payer: MEDICARE

## 2022-08-29 ENCOUNTER — PATIENT MESSAGE (OUTPATIENT)
Dept: INTERNAL MEDICINE | Facility: CLINIC | Age: 72
End: 2022-08-29
Payer: MEDICARE

## 2022-08-29 NOTE — PROGRESS NOTES
8/29/2022    Attempt to complete Social Work Follow-up for Outpatient Care Management; left message requesting a return call. CHRIS left message informing pt that Dr. Henao stated that she could be seen on 9/1/22 at 9:40 AM and to schedule transportation if she is able to go. CHRIS also sent message in pt portal regarding appointment.  LCSW will reattempt at a later date.

## 2022-08-30 ENCOUNTER — OFFICE VISIT (OUTPATIENT)
Dept: NEUROLOGY | Facility: CLINIC | Age: 72
End: 2022-08-30
Payer: MEDICARE

## 2022-08-30 DIAGNOSIS — G43.711 INTRACTABLE CHRONIC MIGRAINE WITHOUT AURA AND WITH STATUS MIGRAINOSUS: Primary | ICD-10-CM

## 2022-08-30 PROCEDURE — 3066F PR DOCUMENTATION OF TREATMENT FOR NEPHROPATHY: ICD-10-PCS | Mod: CPTII,S$GLB,, | Performed by: PSYCHIATRY & NEUROLOGY

## 2022-08-30 PROCEDURE — 1101F PT FALLS ASSESS-DOCD LE1/YR: CPT | Mod: CPTII,S$GLB,, | Performed by: PSYCHIATRY & NEUROLOGY

## 2022-08-30 PROCEDURE — 3044F PR MOST RECENT HEMOGLOBIN A1C LEVEL <7.0%: ICD-10-PCS | Mod: CPTII,S$GLB,, | Performed by: PSYCHIATRY & NEUROLOGY

## 2022-08-30 PROCEDURE — 4010F ACE/ARB THERAPY RXD/TAKEN: CPT | Mod: CPTII,S$GLB,, | Performed by: PSYCHIATRY & NEUROLOGY

## 2022-08-30 PROCEDURE — 99215 OFFICE O/P EST HI 40 MIN: CPT | Mod: S$GLB,,, | Performed by: PSYCHIATRY & NEUROLOGY

## 2022-08-30 PROCEDURE — 99999 PR PBB SHADOW E&M-EST. PATIENT-LVL III: CPT | Mod: PBBFAC,,, | Performed by: PSYCHIATRY & NEUROLOGY

## 2022-08-30 PROCEDURE — 1159F PR MEDICATION LIST DOCUMENTED IN MEDICAL RECORD: ICD-10-PCS | Mod: CPTII,S$GLB,, | Performed by: PSYCHIATRY & NEUROLOGY

## 2022-08-30 PROCEDURE — 1101F PR PT FALLS ASSESS DOC 0-1 FALLS W/OUT INJ PAST YR: ICD-10-PCS | Mod: CPTII,S$GLB,, | Performed by: PSYCHIATRY & NEUROLOGY

## 2022-08-30 PROCEDURE — 1160F PR REVIEW ALL MEDS BY PRESCRIBER/CLIN PHARMACIST DOCUMENTED: ICD-10-PCS | Mod: CPTII,S$GLB,, | Performed by: PSYCHIATRY & NEUROLOGY

## 2022-08-30 PROCEDURE — 3044F HG A1C LEVEL LT 7.0%: CPT | Mod: CPTII,S$GLB,, | Performed by: PSYCHIATRY & NEUROLOGY

## 2022-08-30 PROCEDURE — 4010F PR ACE/ARB THEARPY RXD/TAKEN: ICD-10-PCS | Mod: CPTII,S$GLB,, | Performed by: PSYCHIATRY & NEUROLOGY

## 2022-08-30 PROCEDURE — 1159F MED LIST DOCD IN RCRD: CPT | Mod: CPTII,S$GLB,, | Performed by: PSYCHIATRY & NEUROLOGY

## 2022-08-30 PROCEDURE — 1160F RVW MEDS BY RX/DR IN RCRD: CPT | Mod: CPTII,S$GLB,, | Performed by: PSYCHIATRY & NEUROLOGY

## 2022-08-30 PROCEDURE — 3066F NEPHROPATHY DOC TX: CPT | Mod: CPTII,S$GLB,, | Performed by: PSYCHIATRY & NEUROLOGY

## 2022-08-30 PROCEDURE — 99999 PR PBB SHADOW E&M-EST. PATIENT-LVL III: ICD-10-PCS | Mod: PBBFAC,,, | Performed by: PSYCHIATRY & NEUROLOGY

## 2022-08-30 PROCEDURE — 3288F PR FALLS RISK ASSESSMENT DOCUMENTED: ICD-10-PCS | Mod: CPTII,S$GLB,, | Performed by: PSYCHIATRY & NEUROLOGY

## 2022-08-30 PROCEDURE — 99215 PR OFFICE/OUTPT VISIT, EST, LEVL V, 40-54 MIN: ICD-10-PCS | Mod: S$GLB,,, | Performed by: PSYCHIATRY & NEUROLOGY

## 2022-08-30 PROCEDURE — 3288F FALL RISK ASSESSMENT DOCD: CPT | Mod: CPTII,S$GLB,, | Performed by: PSYCHIATRY & NEUROLOGY

## 2022-08-30 NOTE — PROGRESS NOTES
Procedures   [facility] - NEUROLOGY  [facility], [place]    Date: August 30, 2022   Patient Name: Cecile Bowen   MRN: [id number]   PCP: Kailey Navarro  Referring Provider: No ref. provider found    Assessment:      This is Cecile Bowen, 72 y.o. female with chronic migraines (G43.719) and suffers from headaches more than 15 days a month lasting more than 4 hours a day with no relief of symptoms despite trying multiple medications listed below. Botox treatment was approved for chronic migraines in October 2010.  We are planning for 3 treatments 3 months apart and aiming for at least 50% improvement in the symptoms. If we see no improvement after 3 treatments, we will discontinue the injections. .     Plan:      1, Resume Aimovig  2, Breakthrough headache - Imitrex, tylenol, ubrelvy  3. Resume botox      Greater than 60 minutes spent in chart review, documentation, independent review of imaging labs and ancillary studies, and face to face time with patient       I discussed side effects of the medications. I asked the patient to  stop the medication if She notices serious adverse effects as we discussed and to seek immediate medical attention at an ER.     Enrique Henao MD  [facility]   Department of Neurology    Subjective:     Follow up :     - HA worsened early this year and now present daily, benefit from immitrex but runs out  - Currently wheelchair bound due to deconditioning from recurrent hospitalizations, acknowledges medication non-compliance due to complex regiment but recently started home health    5/2020  -  Continues to note some benefit of aimovig with ongoing daily headache  -  Recent stressor of finding brother dead in their home  1/2020  Improved severity with same near daily frequency of HA with aimovig, benefit of imitrex and robaxin but not flexeril, some improvement in HA with recent improvement in BP   12/2017  Continues with  headaches most days but are markedly less severe, may go 1-2 weeks without excedrin, continued relief from imitrex.  Had a pleasant [place] with family.   8/2017  No benefit from GBP, only 2 HA free days per month but feels generally less severe - takes excedrin migraine 1-2 tabs daily  5/2017  Continued daily headache, stopped fioricet, continued relief from imitrex, unable to comply with CPAP due to severe distress caused by objects around her face related to childhood molesation     HPI:   Ms. Cecile Bowen is a 72 y.o. female who presents with a chief complaint of headache    Headache history:   Age of onset - Teens   Location - Bioccipital goes to crown   Nature of pain - Throbbing   Prodrome - no   Aura - No   Duration of headache - hrs   Time to peak intensity - 1 hr   Pain scale - range of intensity - 7-8/10   Frequency - 4/week , now 5/month   Status Migrainosus history - yes   Time of day of most headaches- anytime   Associated symptoms with the headache:   Meningeal symptoms - photophobia, phonophobia, exercise intolerance +   Nausea/vomitting +   Nasal drainage   Visual blurriness   Pallor/flushing   Dizziness +   Vertigo   Confusion   Impaired concentration +   Pain worsened with physical activity +   Neck pain +   Tension HA:   Location - Bifrontal   Nature of pain - Gripping   Prodrome - no   Aura - No   Duration of headache - hrs   Time to peak intensity - 1 hr   Pain scale - range of intensity - 6/10   Frequency - daily   Time of day of most headaches- morning   Associated symptoms with the headache: none   Headache Triggers: Heat (hot weather, hot baths or showers) , emotional stress +   Weather change +   Other comorbid conditions:   Anxiety - yes   Motion sickness symptom - yes   Treatment history:   Resolution of headache with sleep - yes   Meds tried:   Fioricet - helps   Imitrex - helps   No headache benefit from:  Elavil, effexor, namenda, Mg, robaxin, flexeril, norco, unable to take  NSAIDs due to renal failure  topamax - helped with migraine      Headache risk factors:   H/o TBI - no   H/o Meningitis - no   F/h Aneurysms - no   Takes naps during the day   Denies refreshing sleep   Snoring +     PAST MEDICAL HISTORY:  Past Medical History:   Diagnosis Date    Cervicogenic migraine     Chronic pain     CKD (chronic kidney disease) stage 4, GFR 15-29 ml/min     [facility]    CKD (chronic kidney disease) stage 4, GFR 15-29 ml/min     Diabetes mellitus     Long term use of Insulin, Diabetic Neuropathy    Dialysis patient 1/21/2022    Fibromyalgia     Hydronephrosis     Hyperlipidemia     Hypertension 12/12/2012    Hypothyroidism 12/12/2012    ARON (iron deficiency anemia)     Insomnia     Levoscoliosis     Malignant neoplasm of upper-outer quadrant of left breast in female, estrogen receptor positive     Metabolic acidosis     Mobility impaired     Nuclear sclerosis - Both Eyes 3/24/2014    VIJAYA (obstructive sleep apnea)     Osteopenia     Pulmonary nodule     Recurrent UTI     Renal manifestation of secondary diabetes mellitus     Secondary hyperparathyroidism, renal     Urinary retention        PAST SURGICAL HISTORY:  Past Surgical History:   Procedure Laterality Date    BIOPSY OF URETER Left 2/18/2022    Procedure: BIOPSY, URETER;  Surgeon: Ronel Whittington MD;  Location: [address];  Service: Urology;  Laterality: Left;    BREAST BIOPSY Right     benign    CHOLECYSTECTOMY      COLONOSCOPY N/A 1/13/2017    Procedure: COLONOSCOPY;  Surgeon: Morris Wiseman MD;  Location: [facility] (4[facility]);  Service: Endoscopy;  Laterality: N/A;  Renal pt Nephrology advised to avoid phosphate preps    CYSTOSCOPY N/A 10/8/2018    Procedure: CYSTOSCOPY;  Surgeon: Ronel Whittington MD;  Location: [address];  Service: Urology;  Laterality: N/A;  45 min    CYSTOSCOPY N/A 3/25/2019    Procedure: CYSTOSCOPY;  Surgeon: Ronel Whittington MD;  Location: [address];  Service: Urology;   Laterality: N/A;  45 min    CYSTOSCOPY N/A 8/26/2019    Procedure: CYSTOSCOPY;  Surgeon: Ronel Whittington MD;  Location: [facility] OR 1ST FLR;  Service: Urology;  Laterality: N/A;  45 min    CYSTOSCOPY N/A 7/2/2021    Procedure: CYSTOSCOPY;  Surgeon: Ronel Whittington MD;  Location: [facility] OR 1ST FLR;  Service: Urology;  Laterality: N/A;    CYSTOSCOPY  12/23/2021    Procedure: CYSTOSCOPY;  Surgeon: Ronel Whittington MD;  Location: [facility] OR 1ST FLR;  Service: Urology;;    CYSTOSCOPY  2/18/2022    Procedure: CYSTOSCOPY;  Surgeon: Ronel Whittington MD;  Location: [facility] OR 1ST FLR;  Service: Urology;;    CYSTOSCOPY W/ URETERAL STENT PLACEMENT Left 5/15/2021    Procedure: CYSTOSCOPY, WITH URETERAL STENT INSERTION;  Surgeon: Levy Sánchez Jr., MD;  [facility];  Service: Urology;  Laterality: Left;    CYSTOSCOPY WITH BIOPSY OF BLADDER N/A 1/27/2020    Procedure: CYSTOSCOPY, WITH BLADDER BIOPSY, WITH FULGURATION IF INDICATED;  Surgeon: Ronel Whittington MD;  [facility];  Service: Urology;  Laterality: N/A;    DILATION AND CURETTAGE OF UTERUS      GALLBLADDER SURGERY  2006    HYSTERECTOMY      INJECTION FOR SENTINEL NODE IDENTIFICATION Left 8/1/2019    Procedure: INJECTION, FOR SENTINEL NODE IDENTIFICATION;  Surgeon: Samia Fulton MD;  Location: [facility] OR 2ND FLR;  Service: General;  Laterality: Left;    INJECTION OF BOTULINUM TOXIN TYPE A N/A 10/8/2018    Procedure: INJECTION, BOTULINUM TOXIN, TYPE A 300 UNITS;  Surgeon: Ronel Whittington MD;  Location: [facility] OR 1ST FLR;  Service: Urology;  Laterality: N/A;    INJECTION OF BOTULINUM TOXIN TYPE A N/A 3/25/2019    Procedure: INJECTION, BOTULINUM TOXIN, TYPE A 300 UNITS;  Surgeon: Ronel Whittington MD;  Location: [facility] OR 1ST FLR;  Service: Urology;  Laterality: N/A;    INJECTION OF BOTULINUM TOXIN TYPE A N/A 8/26/2019    Procedure: INJECTION, BOTULINUM TOXIN, TYPE A 300 UNITS;  Surgeon: Ronel Whittington MD;   "Location: [facility] OR 1ST FLR;  Service: Urology;  Laterality: N/A;    MASTECTOMY Left 8/1/2019    Procedure: MASTECTOMY 23 hour stay;  Surgeon: Samia Fulton MD;  Location: [facility] OR 2ND FLR;  Service: General;  Laterality: Left;    MEDIPORT REMOVAL Right 6/24/2022    Procedure: REMOVAL, CATHETER, CENTRAL VENOUS, TUNNELED, WITH PORT;  Surgeon: Isauro Anderson MD;  Location: [facility] CATH LAB;  Service: Radiology;  Laterality: Right;    OVARIAN CYST SURGERY  1985    REPLACEMENT OF STENT Left 12/23/2021    Procedure: REPLACEMENT, STENT;  Surgeon: Ronel Whittington MD;  Location: [facility] OR 1ST FLR;  Service: Urology;  Laterality: Left;    REPLACEMENT OF STENT Left 2/18/2022    Procedure: REPLACEMENT, STENT;  Surgeon: Ronel Whittington MD;  [facility];  Service: Urology;  Laterality: Left;    RETROGRADE PYELOGRAPHY Left 2/18/2022    Procedure: PYELOGRAM, RETROGRADE;  Surgeon: Ronel Whittington MD;  Location: [facility] OR 1ST FLR;  Service: Urology;  Laterality: Left;    SENTINEL LYMPH NODE BIOPSY Left 8/1/2019    Procedure: BIOPSY, LYMPH NODE, SENTINEL;  Surgeon: Samia Fulton MD;  Location: [facility] OR 2ND FLR;  Service: General;  Laterality: Left;    spt placement      TONSILLECTOMY, ADENOIDECTOMY      TOTAL ABDOMINAL HYSTERECTOMY W/ BILATERAL SALPINGOOPHORECTOMY  1985    URETEROSCOPY Left 2/18/2022    Procedure: URETEROSCOPY;  Surgeon: Ronel Whittington MD;  [facility];  Service: Urology;  Laterality: Left;       CURRENT MEDS:  Current Outpatient Medications   Medication Sig Dispense Refill    anastrozole (ARIMIDEX) 1 mg Tab TAKE 1 TABLET BY MOUTH EVERY DAY 90 tablet 2    apixaban (ELIQUIS) 2.5 mg Tab Take 2.5 mg by mouth 2 (two) times daily.      atorvastatin (LIPITOR) 80 MG tablet Take 1 tablet (80 mg total) by mouth once daily. 90 tablet 3    BD INSULIN SYRINGE ULTRA-FINE 0.5 mL 31 gauge x 5/16" Syrg USE WITH INSULIN 4 TIMES A  each 12    blood sugar diagnostic Strp To " check BG  1-2  times daily, to use with insurance preferred meter, e 11.65 50 strip 11    blood-glucose meter kit To check BG 1-2 times daily, to use with insurance preferred meter, e 11.65 1 each 0    butalbital-acetaminophen-caffeine -40 mg (FIORICET, ESGIC) -40 mg per tablet TAKE 1 TABLET BY MOUTH WHEN NOT CONTROLLED BY OTHER MEDS. NO MORE THAN 10 TABS PER 30 DAYS (Patient taking differently: Take 1 tablet by mouth daily as needed (MIGRAINES). TAKE 1 TABLET BY MOUTH WHEN NOT CONTROLLED BY OTHER MEDS. NO MORE THAN 10 TABS PER 30 DAYS) 10 tablet 0    diclofenac sodium (VOLTAREN) 1 % Gel Apply to chest wall as needed for arthritis      DULoxetine (CYMBALTA) 30 MG capsule Take 1 capsule (30 mg total) by mouth once daily. 90 capsule 3    ergocalciferol (ERGOCALCIFEROL) 50,000 unit Cap TAKE ONE CAPSULE BY MOUTH ONE TIME PER WEEK, TAKES ON TUESDAYS 12 capsule 3    HYDROcodone-acetaminophen (NORCO)  mg per tablet Take 1 tablet by mouth every 6 (six) hours as needed for Pain. 120 tablet 0    insulin (LANTUS SOLOSTAR U-100 INSULIN) glargine 100 units/mL (3mL) SubQ pen Inject 8-10 Units into the skin once daily.      insulin lispro 100 unit/mL injection Inject 2-10 Units into the skin 3 (three) times daily with meals. Based on correction scale 180-230+2, 231-280+4, etc. Max daily 30 units- may substitute novolog -same dosing. 30 mL 6    lancets Misc To check BG 1-2 times daily, to use with insurance preferred meter, e 11.65 50 each 11    levothyroxine (SYNTHROID) 50 MCG tablet Take 1 tablet (50 mcg total) by mouth before breakfast. Administer on an empty stomach at least 30 minutes before food. If receiving tube feeds, HOLD tube feeds for 1 hour before and after levothyroxine administration. 90 tablet 2    methocarbamoL (ROBAXIN) 750 MG Tab TAKE 1-2 TABLETS (750-1,500 MG TOTAL) BY MOUTH 3 (THREE) TIMES DAILY AS NEEDED. 180 tablet 1    ondansetron (ZOFRAN-ODT) 8 MG TbDL TAKE 1 TABLET BY MOUTH EVERY 12 HOURS  "AS NEEDED. 30 tablet 2    oxybutynin (DITROPAN-XL) 10 MG 24 hr tablet TAKE 1 TABLET BY MOUTH EVERY DAY (Patient taking differently: Take 10 mg by mouth once daily.) 90 tablet 1    pen needle, diabetic (BD ULTRA-FINE SHORT PEN NEEDLE) 31 gauge x 5/16" Ndle USE WITH LANTUS DAILY, e 11.65 100 each 3    sumatriptan (IMITREX) 100 MG tablet TAKE 1 TABLET (100 MG TOTAL) BY MOUTH 2 (TWO) TIMES DAILY AS NEEDED FOR MIGRAINE. 9 tablet 11    traZODone (DESYREL) 100 MG tablet TAKE 1 TABLET (100 MG TOTAL) BY MOUTH NIGHTLY AS NEEDED FOR INSOMNIA. 90 tablet 0    erenumab-aooe (AIMOVIG) 140 mg/mL autoinjector Inject 1 mL (140 mg total) into the skin every 30 days. 1 mL 11    gemfibroziL (LOPID) 600 MG tablet Take 1 tablet (600 mg total) by mouth every Mon, Wed, Fri. 36 tablet 3    polyethylene glycol (GLYCOLAX) 17 gram PwPk One packet QD prn constipation 30 packet 6     No current facility-administered medications for this visit.       ALLERGIES:  Review of patient's allergies indicates:  No Known Allergies    FAMILY HISTORY:  Family History   Problem Relation Age of Onset    Diabetes Sister     Kidney disease Sister         CKD III    ALS Mother         d.    Cancer Maternal Grandmother         d. colon    Cancer Paternal Grandfather         d. lung    Scoliosis Brother         increased pain    Prostate cancer Brother         cured s/p surgery    Cancer Brother         rare cancer that got into the bones    Diabetes Maternal Aunt     Kidney disease Maternal Aunt     Diabetes Maternal Uncle     Amblyopia Neg Hx     Blindness Neg Hx     Cataracts Neg Hx     Glaucoma Neg Hx     Macular degeneration Neg Hx     Retinal detachment Neg Hx     Strabismus Neg Hx        SOCIAL HISTORY:  Social History     Tobacco Use    Smoking status: Never    Smokeless tobacco: Never   Substance Use Topics    Alcohol use: No     Alcohol/week: 0.0 standard drinks    Drug use: No       Review of Systems:  12 review of systems is negative except for the " symptoms mentioned in HPI.        Objective:     There were no vitals filed for this visit.      General: NAD, well nourished   Eyes: no tearing, discharge, no erythema   ENT: moist mucous membranes of the oral cavity, nares patent    Neck: Supple, full range of motion  Cardiovascular: Warm and well perfused, pulses equal and symmetrical  Lungs: Normal work of breathing, normal chest wall excursions  Skin: No rash, lesions, or breakdown on exposed skin  Psychiatry: Mood and affect are appropriate   Abdomen: soft, non tender, non distended  Extremeties: No cyanosis, clubbing or edema.    Neurological   MENTAL STATUS: Alert and oriented to person, place, and time. Attention and concentration within normal limits. Speech without dysarthria, able to name and repeat without difficulty. Recent and remote memory within normal limits   CRANIAL NERVES: Visual fields intact. PERRL. EOMI. Facial sensation intact. Face symmetrical. Hearing grossly intact. Full shoulder shrug bilaterally. Tongue protrudes midline   SENSORY: Sensation is intact to light touch throughout.    MOTOR: Normal bulk and tone. No pronator drift.   CEREBELLAR/COORDINATION/GAIT: FTN intact    BOTOX was performed as an indicated therapy for intractable chronic migraine headaches given that the patient failed several prophylactic medications    Botulinum Toxin Injection Procedure   Pre-operative diagnosis: Chronic migraine   Post-operative diagnosis: Same   Procedure: Chemical neurolysis   After risks and benefits were explained including bleeding, infection, worsening of pain, damage to the areas being injected, weakness of muscles, loss of muscle control, dysphagia if injecting the head or neck, facial droop if injecting the facial area, painful injection, allergic or other reaction to the medications being injected, and the failure of the procedure to help the problem, a signed consent was obtained.   The patient was placed in a comfortable area and the  sites to be treated were identified.The area to be treated was prepped three times with alcohol and the alcohol allowed to dry. Next, a 30 gauge needle was used to inject the medication in the area to be treated.   Area(s) injected:   Total Botox used: 155 Units   Botox wastage: 45 Units   Injection sites:    muscle bilaterally ( a total of 10 units divided into 2 sites)   Procerus muscle (5 units)   Frontalis muscle bilaterally (a total of 20 units divided into 4 sites)   Temporalis muscle bilaterally (a total of 40 units divided into 8 sites)   Occipitalis muscle bilaterally (a total of 30 units divided into 6 sites)   Cervical paraspinal muscles (a total of 20 units divided into 4 sites)   Trapezius muscle bilaterally (a total of 30 units divided into 6 sites)   Complications: none   RTC for the next Botox injection: 3 months

## 2022-09-01 ENCOUNTER — LAB VISIT (OUTPATIENT)
Dept: LAB | Facility: HOSPITAL | Age: 72
End: 2022-09-01
Attending: NURSE PRACTITIONER
Payer: MEDICARE

## 2022-09-01 ENCOUNTER — PATIENT MESSAGE (OUTPATIENT)
Dept: UROLOGY | Facility: CLINIC | Age: 72
End: 2022-09-01
Payer: MEDICARE

## 2022-09-01 ENCOUNTER — TELEPHONE (OUTPATIENT)
Dept: NEUROLOGY | Facility: CLINIC | Age: 72
End: 2022-09-01
Payer: MEDICARE

## 2022-09-01 DIAGNOSIS — N31.9 HIGH COMPLIANCE BLADDER: Primary | ICD-10-CM

## 2022-09-01 LAB
BACTERIA #/AREA URNS AUTO: ABNORMAL /HPF
BILIRUB UR QL STRIP: NEGATIVE
CLARITY UR REFRACT.AUTO: ABNORMAL
COLOR UR AUTO: COLORLESS
GLUCOSE UR QL STRIP: NEGATIVE
HGB UR QL STRIP: NEGATIVE
HYALINE CASTS UR QL AUTO: 0 /LPF
KETONES UR QL STRIP: NEGATIVE
LEUKOCYTE ESTERASE UR QL STRIP: ABNORMAL
MICROSCOPIC COMMENT: ABNORMAL
NITRITE UR QL STRIP: POSITIVE
PH UR STRIP: 8 [PH] (ref 5–8)
PROT UR QL STRIP: ABNORMAL
RBC #/AREA URNS AUTO: 2 /HPF (ref 0–4)
SP GR UR STRIP: 1.01 (ref 1–1.03)
TRI-PHOS CRY UR QL COMP ASSIST: ABNORMAL
URN SPEC COLLECT METH UR: ABNORMAL
WBC #/AREA URNS AUTO: 16 /HPF (ref 0–5)

## 2022-09-01 PROCEDURE — 81001 URINALYSIS AUTO W/SCOPE: CPT | Performed by: NURSE PRACTITIONER

## 2022-09-01 PROCEDURE — 87184 SC STD DISK METHOD PER PLATE: CPT | Mod: 59 | Performed by: NURSE PRACTITIONER

## 2022-09-01 PROCEDURE — 87088 URINE BACTERIA CULTURE: CPT | Performed by: NURSE PRACTITIONER

## 2022-09-01 PROCEDURE — 87077 CULTURE AEROBIC IDENTIFY: CPT | Mod: 59 | Performed by: NURSE PRACTITIONER

## 2022-09-01 PROCEDURE — 87186 SC STD MICRODIL/AGAR DIL: CPT | Mod: 59 | Performed by: NURSE PRACTITIONER

## 2022-09-01 PROCEDURE — 87086 URINE CULTURE/COLONY COUNT: CPT | Performed by: NURSE PRACTITIONER

## 2022-09-03 ENCOUNTER — PATIENT MESSAGE (OUTPATIENT)
Dept: PHYSICAL MEDICINE AND REHAB | Facility: CLINIC | Age: 72
End: 2022-09-03
Payer: MEDICARE

## 2022-09-06 ENCOUNTER — PATIENT MESSAGE (OUTPATIENT)
Dept: UROLOGY | Facility: CLINIC | Age: 72
End: 2022-09-06
Payer: MEDICARE

## 2022-09-06 DIAGNOSIS — N39.0 BACTERIAL UTI: Primary | ICD-10-CM

## 2022-09-06 DIAGNOSIS — A49.9 BACTERIAL UTI: Primary | ICD-10-CM

## 2022-09-06 LAB
BACTERIA UR CULT: ABNORMAL
BACTERIA UR CULT: ABNORMAL

## 2022-09-06 RX ORDER — LEVOFLOXACIN 500 MG/1
500 TABLET, FILM COATED ORAL DAILY
Qty: 7 TABLET | Refills: 0 | Status: SHIPPED | OUTPATIENT
Start: 2022-09-06 | End: 2022-09-13

## 2022-09-06 NOTE — TELEPHONE ENCOUNTER
Uti symptoms;   Urine Culture, Routine  Abnormal   KLEBSIELLA PNEUMONIAE ESBL   >100,000 cfu/ml     Urine Culture, Routine  Abnormal   PROTEUS MIRABILIS   > 100,000 cfu/ml      Levaquin 500mg sent to pharmacy

## 2022-09-07 ENCOUNTER — TELEPHONE (OUTPATIENT)
Dept: PHARMACY | Facility: CLINIC | Age: 72
End: 2022-09-07
Payer: MEDICARE

## 2022-09-08 ENCOUNTER — OUTPATIENT CASE MANAGEMENT (OUTPATIENT)
Dept: ADMINISTRATIVE | Facility: OTHER | Age: 72
End: 2022-09-08
Payer: MEDICARE

## 2022-09-08 NOTE — ASSESSMENT & PLAN NOTE
- follows w/pain specialist  - Improved back pain   - continue norco 10mg q6h for mod pain   no diplopia/no photophobia/no lacrimation L/no lacrimation R/no blurred vision L/no blurred vision R

## 2022-09-10 ENCOUNTER — PATIENT MESSAGE (OUTPATIENT)
Dept: PHYSICAL MEDICINE AND REHAB | Facility: CLINIC | Age: 72
End: 2022-09-10
Payer: MEDICARE

## 2022-09-10 ENCOUNTER — PATIENT MESSAGE (OUTPATIENT)
Dept: PHARMACY | Facility: CLINIC | Age: 72
End: 2022-09-10
Payer: MEDICARE

## 2022-09-12 ENCOUNTER — PATIENT MESSAGE (OUTPATIENT)
Dept: PHYSICAL MEDICINE AND REHAB | Facility: CLINIC | Age: 72
End: 2022-09-12
Payer: MEDICARE

## 2022-09-12 DIAGNOSIS — G89.29 CHRONIC NECK PAIN: ICD-10-CM

## 2022-09-12 DIAGNOSIS — M79.7 FIBROMYALGIA: ICD-10-CM

## 2022-09-12 DIAGNOSIS — M54.50 CHRONIC MIDLINE LOW BACK PAIN WITHOUT SCIATICA: ICD-10-CM

## 2022-09-12 DIAGNOSIS — G89.29 CHRONIC MIDLINE LOW BACK PAIN WITHOUT SCIATICA: ICD-10-CM

## 2022-09-12 DIAGNOSIS — M54.2 CHRONIC NECK PAIN: ICD-10-CM

## 2022-09-12 RX ORDER — HYDROCODONE BITARTRATE AND ACETAMINOPHEN 10; 325 MG/1; MG/1
1 TABLET ORAL EVERY 6 HOURS PRN
Qty: 120 TABLET | Refills: 0 | Status: SHIPPED | OUTPATIENT
Start: 2022-09-13 | End: 2022-10-11 | Stop reason: SDUPTHER

## 2022-09-12 RX ORDER — METHOCARBAMOL 750 MG/1
750-1500 TABLET, FILM COATED ORAL 3 TIMES DAILY PRN
Qty: 180 TABLET | Refills: 1 | Status: SHIPPED | OUTPATIENT
Start: 2022-09-13 | End: 2022-12-16

## 2022-09-16 ENCOUNTER — OUTPATIENT CASE MANAGEMENT (OUTPATIENT)
Dept: ADMINISTRATIVE | Facility: OTHER | Age: 72
End: 2022-09-16
Payer: MEDICARE

## 2022-09-16 NOTE — PROGRESS NOTES
9/16/2022    Attempt to complete Social Work Follow-up for Outpatient Care Management; left message requesting a return call.  LCSW will reattempt at a later date.

## 2022-09-19 ENCOUNTER — OUTPATIENT CASE MANAGEMENT (OUTPATIENT)
Dept: ADMINISTRATIVE | Facility: OTHER | Age: 72
End: 2022-09-19
Payer: MEDICARE

## 2022-09-19 NOTE — PROGRESS NOTES
9/19/2022       Attempt to complete Social Work Assessment for Outpatient Care Management; left message requesting a return call.  LCSW will reattempt at a later date.

## 2022-09-23 ENCOUNTER — OUTPATIENT CASE MANAGEMENT (OUTPATIENT)
Dept: ADMINISTRATIVE | Facility: OTHER | Age: 72
End: 2022-09-23
Payer: MEDICARE

## 2022-09-23 NOTE — PROGRESS NOTES
9/23/2022      Attempt to complete Social Work Follow-up for Outpatient Care Management; left message requesting a return call.  LCSW will reattempt at a later date.

## 2022-09-27 ENCOUNTER — OUTPATIENT CASE MANAGEMENT (OUTPATIENT)
Dept: ADMINISTRATIVE | Facility: OTHER | Age: 72
End: 2022-09-27

## 2022-09-27 ENCOUNTER — EXTERNAL HOME HEALTH (OUTPATIENT)
Dept: HOME HEALTH SERVICES | Facility: HOSPITAL | Age: 72
End: 2022-09-27
Payer: MEDICARE

## 2022-09-27 NOTE — PROGRESS NOTES
Outpatient Care Management  Plan of Care Follow Up Visit    Patient: Cecile Bowen  MRN: [id number]  Date of Service: 09/27/22  Completed by: Siena Botello RN  Referral Date: 06/24/2022    Reason for Visit   Patient presents with    OPCM Chart Review     9/8/22    OPCM RN First Follow-Up Attempt     9/8/22    Update Plan Of Care     9/27/22       Brief Summary: Pt reports that she is still having the migraines daily because she just started back on the Amovig and states the medication takes a while to build in her system. Pt has vaibhav doing previously discussed measures to manage migraines. Pt reports she has been compliant with monitoring and recording BS readings; pt has logbook for readings. Pt reports meals include baked potatoes with margarine, blueberry muffin with chicken teriyaki, chinese food with added 1/2 can of peas from home, or chinese food of shrimp and broccoli with no rice. Discuss meal options and how to make healthier choices.     Patient Summary     Involvement of Care:  Do I have permission to speak with other family members about your care?  Yes    Patient Reported Labs & Vitals:  1.  Any Patient Reported Labs & Vitals?  Yes  2.  Patient Reported Blood Pressure:     3.  Patient Reported Pulse:     4.  Patient Reported Weight (Kg):     5.  Patient Reported Blood Glucose (mg/dl):  94, 152    Medical and social history was reviewed with patient and/or caregiver.     Clinical Assessment     Reviewed and provided basic information on available community resources for mental health, transportation, wellness resources, and palliative care programs with patient and/or caregiver.     Complex Care Plan     Care plan was discussed and completed today with input from patient and/or caregiver.    Patient Instructions     Instructions were provided via the [employer] patient resources and are available for the patient to view on the patient portal.    Next Steps: F/U concerning management of diabetes diet  F/U  concerning migraine management     Follow up in about 1 month (around 10/11/2022).    Todays OPCM Self-Management Care Plan was developed with the patients/caregivers input and was based on identified barriers from todays assessment.  Goals were written today with the patient/caregiver and the patient has agreed to work towards these goals to improve his/her overall well-being. Patient verbalized understanding of the care plan, goals, and all of today's instructions. Encouraged patient/caregiver to communicate with his/her physician and health care team about health conditions and the treatment plan.  Provided my contact information today and encouraged patient/caregiver to call me with any questions as needed.

## 2022-09-27 NOTE — PROGRESS NOTES
Outpatient Care Management   - Care Plan Follow Up    Patient: Cecile Bowen  MRN:  [id number]  Date of Service:  9/27/2022  Completed by:  Denisse Fitzgerald LCSW  Referral Date: 06/24/2022    Reason for Visit   Patient presents with    OPCM Chart Review    Update Plan Of Care    OPCM SW FOLLOW-UP       9/27/2022    Brief Summary: SW telephoned pt for a follow up.Pt was able to go to rescheduled appointment. SW will follow up in two weeks or PRN.     Complex Care Plan     Care plan was discussed and completed today with input from patient and/or caregiver.    Patient Instructions     Follow up in about 2 weeks (around 10/11/2022).    Todays OPCM Self-Management Care Plan was developed with the patients/caregivers input and was based on identified barriers from todays assessment.  Goals were written today with the patient/caregiver and the patient has agreed to work towards these goals to improve his/her overall well-being. Patient verbalized understanding of the care plan, goals, and all of today's instructions. Encouraged patient/caregiver to communicate with his/her physician and health care team about health conditions and the treatment plan.  Provided my contact information today and encouraged patient/caregiver to call me with any questions as needed.

## 2022-10-11 ENCOUNTER — OUTPATIENT CASE MANAGEMENT (OUTPATIENT)
Dept: ADMINISTRATIVE | Facility: OTHER | Age: 72
End: 2022-10-11
Payer: MEDICARE

## 2022-10-11 RX ORDER — HYDROCODONE BITARTRATE AND ACETAMINOPHEN 10; 325 MG/1; MG/1
1 TABLET ORAL EVERY 6 HOURS PRN
Qty: 120 TABLET | Refills: 0 | Status: SHIPPED | OUTPATIENT
Start: 2022-10-14 | End: 2022-11-11 | Stop reason: SDUPTHER

## 2022-10-11 NOTE — TELEPHONE ENCOUNTER
----- Message from Kevin Gao sent at 10/11/2022  4:35 PM CDT -----  Contact: [phone]  Type:  RX Refill Request    Who Called: pt    Refill or New Rx:    RX Name and Strength:HYDROcodone-acetaminophen (NORCO)  mg per     Preferred Pharmacy with phone number:   [facility]/pharmacy #[id number] - [place], [place] - [address]  [address]  [place] [address]  Phone: [phone] Fax: [phone]          Local or Mail Order: local    Would the patient rather a call back or a response via [facility]?     Best Call Back Number [phone]      Additional Information:

## 2022-10-14 ENCOUNTER — TELEPHONE (OUTPATIENT)
Dept: PHYSICAL MEDICINE AND REHAB | Facility: CLINIC | Age: 72
End: 2022-10-14
Payer: MEDICARE

## 2022-10-14 ENCOUNTER — OUTPATIENT CASE MANAGEMENT (OUTPATIENT)
Dept: ADMINISTRATIVE | Facility: OTHER | Age: 72
End: 2022-10-14

## 2022-10-14 DIAGNOSIS — M54.2 CHRONIC NECK PAIN: ICD-10-CM

## 2022-10-14 DIAGNOSIS — G89.29 CHRONIC NECK PAIN: ICD-10-CM

## 2022-10-14 DIAGNOSIS — M79.7 FIBROMYALGIA: ICD-10-CM

## 2022-10-14 DIAGNOSIS — G89.29 CHRONIC MIDLINE LOW BACK PAIN WITHOUT SCIATICA: ICD-10-CM

## 2022-10-14 DIAGNOSIS — M54.50 CHRONIC MIDLINE LOW BACK PAIN WITHOUT SCIATICA: ICD-10-CM

## 2022-10-14 DIAGNOSIS — R26.9 ABNORMALITY OF GAIT AND MOBILITY: Primary | ICD-10-CM

## 2022-10-14 DIAGNOSIS — R53.81 DEBILITY: ICD-10-CM

## 2022-10-14 NOTE — TELEPHONE ENCOUNTER
----- Message from Denisse Fitzgerald LCSW sent at 10/14/2022  3:59 PM CDT -----  Regarding: Request for Ray Lift  Good Afternoon:    The pt is requesting an electric ray lift. She currently has a manual lift and stated that it is very difficult for her sister, who is her primary caregiver, to operate. Can you please contact the pt to discuss and order for her if possible. Thank you.      Sincerely,    HARVEY Hopper, GAETANOW  \[facility] [place] [facility]\""

## 2022-10-14 NOTE — TELEPHONE ENCOUNTER
I called the patient.    He has impaired transfers due to weakness, morbid obesity and chronic back pain.  She has been using a manual Ray lift.  Her sister who is her caregiver cannot operated.  She is asking for a powered Ray lift.  I wrote a prescription and will have it faxed at her request to [facility].

## 2022-10-14 NOTE — PROGRESS NOTES
Outpatient Care Management   - Care Plan Follow Up    Patient: Cecile Bowen  MRN:  [id number]  Date of Service:  10/14/2022  Completed by:  Denisse Fitzgerald LCSW  Referral Date: 06/24/2022    Reason for Visit   Patient presents with    OPCM Chart Review    OPCM SW FOLLOW-UP    Update Plan Of Care     10/14/2022  Brief Summary: SW telephoned the pt for a follow up. SW reached out to doctor for pt DME request. SW will follow up with pt in two weeks or PRN.     Complex Care Plan     Care plan was discussed and completed today with input from patient and/or caregiver.    Patient Instructions     Follow up in about 2 weeks (around 10/28/2022).    Todays OPCM Self-Management Care Plan was developed with the patients/caregivers input and was based on identified barriers from todays assessment.  Goals were written today with the patient/caregiver and the patient has agreed to work towards these goals to improve his/her overall well-being. Patient verbalized understanding of the care plan, goals, and all of today's instructions. Encouraged patient/caregiver to communicate with his/her physician and health care team about health conditions and the treatment plan.  Provided my contact information today and encouraged patient/caregiver to call me with any questions as needed.

## 2022-10-27 ENCOUNTER — PATIENT MESSAGE (OUTPATIENT)
Dept: PHYSICAL MEDICINE AND REHAB | Facility: CLINIC | Age: 72
End: 2022-10-27
Payer: MEDICARE

## 2022-11-01 ENCOUNTER — OUTPATIENT CASE MANAGEMENT (OUTPATIENT)
Dept: ADMINISTRATIVE | Facility: OTHER | Age: 72
End: 2022-11-01
Payer: MEDICARE

## 2022-11-03 NOTE — PROGRESS NOTES
Outpatient Care Management  Plan of Care Follow Up Visit    Patient: Cecile Bowen  MRN: [id number]  Date of Service: 10/18/2022  Completed by: Siena Botello RN  Referral Date: 06/24/2022    Reason for Visit   Patient presents with    OPCM Chart Review     10/11/22    OPCM RN First Follow-Up Attempt     10/11/22    Update Plan Of Care     10/18/22       Brief Summary: Pt reports she is still having daily migraines. Pt started the Amovig and states this does help with the severity of the migraines but does not stop her from having migraines. Pt reports she has been sitting quietly in her room with lights dim/dark when the migraines start. Pt reports on some days she is able to get up to 5 bottles of water daily but admits this is not everyday; pt states drinking 5 bottled thurman daily is hard to consume some days. Pt reports when she does not drink 5 bottles she at least tries to do 3-4 bottles.     Patient Summary     Involvement of Care:  Do I have permission to speak with other family members about your care?  Yes    Patient Reported Labs & Vitals:  1.  Any Patient Reported Labs & Vitals?  No  2.  Patient Reported Blood Pressure:     3.  Patient Reported Pulse:     4.  Patient Reported Weight (Kg):     5.  Patient Reported Blood Glucose (mg/dl):       Medical and social history was reviewed with patient and/or caregiver.     Clinical Assessment     Reviewed and provided basic information on available community resources for mental health, transportation, wellness resources, and palliative care programs with patient and/or caregiver.     Complex Care Plan     Care plan was discussed and completed today with input from patient and/or caregiver.    Patient Instructions     Instructions were provided via the [facility] patient resources and are available for the patient to view on the patient portal.    Next Steps: F/U concerning management of diabetes     Follow up in about 3 weeks (around 11/1/2022).    Todays OPCM  Self-Management Care Plan was developed with the patients/caregivers input and was based on identified barriers from todays assessment.  Goals were written today with the patient/caregiver and the patient has agreed to work towards these goals to improve his/her overall well-being. Patient verbalized understanding of the care plan, goals, and all of today's instructions. Encouraged patient/caregiver to communicate with his/her physician and health care team about health conditions and the treatment plan.  Provided my contact information today and encouraged patient/caregiver to call me with any questions as needed.

## 2022-11-03 NOTE — PROGRESS NOTES
Outpatient Care Management  Plan of Care Follow Up Visit    Patient: Cecile Bowen  MRN: [id number]  Date of Service: 11/7/2022  Completed by: Siena Botello RN  Referral Date: 06/24/2022    Reason for Visit   Patient presents with    OPCM Chart Review     11/1/22    OPCM RN First Follow-Up Attempt     11/1/22    Update Plan Of Care     11/7/22       Brief Summary: Pt reports BS at 206 at this time. Reinforced with pt BS parameters and when she should call her physician. Pt reports she has been checking recording BS readings daily. Pt reports she she has been drinking approximately 4-5 bottles of water daily. Reinforced diabetic food choices with pt.     Patient Summary     Involvement of Care:  Do I have permission to speak with other family members about your care?  Yes    Patient Reported Labs & Vitals:  1.  Any Patient Reported Labs & Vitals?  Yes  2.  Patient Reported Blood Pressure:     3.  Patient Reported Pulse:     4.  Patient Reported Weight (Kg):     5.  Patient Reported Blood Glucose (mg/dl):  206    Medical and social history was reviewed with patient and/or caregiver.     Clinical Assessment     Reviewed and provided basic information on available community resources for mental health, transportation, wellness resources, and palliative care programs with patient and/or caregiver.     Complex Care Plan     Care plan was discussed and completed today with input from patient and/or caregiver.    Patient Instructions     Instructions were provided via the [facility] patient resources and are available for the patient to view on the patient portal.    Next Steps: F/U concerning management of diabetes     Follow up in about 20 days (around 11/21/2022).    Todays OPCM Self-Management Care Plan was developed with the patients/caregivers input and was based on identified barriers from todays assessment.  Goals were written today with the patient/caregiver and the patient has agreed to work towards these goals to  improve his/her overall well-being. Patient verbalized understanding of the care plan, goals, and all of today's instructions. Encouraged patient/caregiver to communicate with his/her physician and health care team about health conditions and the treatment plan.  Provided my contact information today and encouraged patient/caregiver to call me with any questions as needed.

## 2022-11-09 ENCOUNTER — PATIENT MESSAGE (OUTPATIENT)
Dept: PHYSICAL MEDICINE AND REHAB | Facility: CLINIC | Age: 72
End: 2022-11-09
Payer: MEDICARE

## 2022-11-11 ENCOUNTER — PATIENT MESSAGE (OUTPATIENT)
Dept: PHYSICAL MEDICINE AND REHAB | Facility: CLINIC | Age: 72
End: 2022-11-11
Payer: MEDICARE

## 2022-11-11 RX ORDER — HYDROCODONE BITARTRATE AND ACETAMINOPHEN 10; 325 MG/1; MG/1
1 TABLET ORAL EVERY 6 HOURS PRN
Qty: 120 TABLET | Refills: 0 | Status: SHIPPED | OUTPATIENT
Start: 2022-11-13 | End: 2022-12-13 | Stop reason: SDUPTHER

## 2022-11-21 ENCOUNTER — OUTPATIENT CASE MANAGEMENT (OUTPATIENT)
Dept: ADMINISTRATIVE | Facility: OTHER | Age: 72
End: 2022-11-21
Payer: MEDICARE

## 2022-11-21 NOTE — PROGRESS NOTES
11/21/2022     Attempt to complete Social Work Follow-up for Outpatient Care Management; pt requested a return call.  LCSW will reattempt at a later date.

## 2022-11-25 ENCOUNTER — EXTERNAL HOME HEALTH (OUTPATIENT)
Dept: HOME HEALTH SERVICES | Facility: HOSPITAL | Age: 72
End: 2022-11-25
Payer: MEDICARE

## 2022-11-25 ENCOUNTER — OUTPATIENT CASE MANAGEMENT (OUTPATIENT)
Dept: ADMINISTRATIVE | Facility: OTHER | Age: 72
End: 2022-11-25
Payer: MEDICARE

## 2022-11-25 NOTE — PROGRESS NOTES
Outpatient Care Management   - Care Plan Follow Up    Patient: Cecile Bowen  MRN:  [id number]  Date of Service:  11/25/2022  Completed by:  Denisse Fitzgerald LCSW  Referral Date: 06/24/2022    Reason for Visit   Patient presents with    OPCM Chart Review    Update Plan Of Care    OPCM SW FOLLOW-UP     11/25/2022  Brief Summary: SW telephoned the pt for a follow up. SW will follow up in two weeks.     Complex Care Plan     Care plan was discussed and completed today with input from patient and/or caregiver.    Patient Instructions     Follow up in about 2 weeks (around 12/9/2022).    Todays OPCM Self-Management Care Plan was developed with the patients/caregivers input and was based on identified barriers from todays assessment.  Goals were written today with the patient/caregiver and the patient has agreed to work towards these goals to improve his/her overall well-being. Patient verbalized understanding of the care plan, goals, and all of today's instructions. Encouraged patient/caregiver to communicate with his/her physician and health care team about health conditions and the treatment plan.  Provided my contact information today and encouraged patient/caregiver to call me with any questions as needed.

## 2022-11-30 NOTE — TELEPHONE ENCOUNTER
----- Message from Sima Weldon sent at 3/25/2022 12:38 PM CDT -----  Contact: Manasa with [facility]   Manasa is following up on the status of a CGM form faxed on 03/23/22 to . Manasa states she will refax to .      Opt out

## 2022-12-01 NOTE — PROGRESS NOTES
Subjective:       Patient ID: Cecile Bowen is a 67 y.o. female.    Chief Complaint: Urinary Retention (Neurogenic Bladder)    Cecile Bowen is a 67 y.o. Female with history of bilateral hydronephrosis, incomplete bladder emptying which is managed by SPT (16fr).  Her last SPT change was 06/28/2017    Here today for scheduled SPT change.   She voices of some lower abdomen tenderness.  SPT is draining well.  No fever, n/v    Good urine flow through SPT.  She reports was in the ER 06/24/2017 for headache and elevated BP.  Was not admitted.   She was started on cipro 2/2 UTI; currently taking them.  06/24/2017 Urine Culture, Routine  PSEUDOMONAS AERUGINOSA  >100,000 cfu/ml     Last visit with Dr. Whittington was 11/10/2015.    12/10/2015:  Procedure(s) Performed:   1. Cystoscopy with bladder botox injection  2. Silver nitrate to suprapubic catheter site  3 . Change suprapubic catheter tube            Past Medical History:    Diabetes type 2, uncontrolled                   12/12/2012    Obesity                                         12/12/2012    Hypertension                                    12/12/2012    DJD (degenerative joint disease)                12/12/2012    Hypothyroidism                                  12/12/2012    Nuclear sclerosis - Both Eyes                   3/24/2014     Migraine                                                      Past Surgical History:    TOTAL ABDOMINAL HYSTERECTOMY W/ BILATERAL SALP*  1985          GALLBLADDER SURGERY                              2006          TONSILLECTOMY, ADENOIDECTOMY                                   OVARIAN CYST SURGERY                             1985          HYSTERECTOMY                                                   DILATION AND CURETTAGE OF UTERUS                               Review of patient's family history indicates:    Diabetes                       Sister                    Kidney disease                 Sister                    ALS         "                    Mother                      Comment: d.    Cancer                         Maternal Grandmother        Comment: león. colon    Cancer                         Paternal Grandfather        Comment: d. lung    Diabetes                       Maternal Aunt             Kidney disease                 Maternal Aunt             Diabetes                       Maternal Uncle            Amblyopia                      Neg Hx                    Blindness                      Neg Hx                    Cataracts                      Neg Hx                    Glaucoma                       Neg Hx                    Macular degeneration           Neg Hx                    Retinal detachment             Neg Hx                    Strabismus                     Neg Hx                      Social History    Marital Status:             Spouse Name:                       Years of Education:                 Number of children:               Occupational History    None on file    Social History Main Topics    Smoking Status: Never Smoker                      Smokeless Status: Never Used                        Alcohol Use: No              Drug Use: No              Sexual Activity: Not Currently           Birth Control/Protection: Abstinence    Other Topics            Concern    None on file    Social History Narrative    Single      Lives w/ sister  And brother     both are helping her during her post hosp recovery period        Allergies:  Review of patient's allergies indicates no known allergies.    Medications:  Current outpatient prescriptions:amitriptyline (ELAVIL) 10 MG tablet, TAKE 3 TABLETS BY MOUTH IN THE EVENING, Disp: 90 tablet, Rfl: 5;  aspirin (ECOTRIN) 81 MG EC tablet, Take 81 mg by mouth once daily., Disp: , Rfl: ;  BD INSULIN PEN NEEDLE UF SHORT 31 X 5/16 " Ndle, USE ONCE DAILY WITH LANTUS SOLOSTAR PEN INSULIN, Disp: 100 each, Rfl: 11  butalbital-acetaminophen-caffeine -40 mg (FIORICET, ESGIC) " "-40 mg per tablet, TAKE 1 TABLET EVERY 4 TO 6 HOURS, Disp: 30 tablet, Rfl: 0;  cyanocobalamin, vitamin B-12, (VITAMIN B-12) 2,500 mcg Subl, Place 2,500 mcg under the tongue once daily., Disp: , Rfl: ;  diphenhydrAMINE (BENADRYL) 25 mg capsule, Take 25 mg by mouth every 6 (six) hours as needed., Disp: , Rfl:   ferrous sulfate 325 (65 FE) MG EC tablet, Take 325 mg by mouth 3 (three) times daily with meals., Disp: , Rfl: ;  fluticasone (FLONASE) 50 mcg/actuation nasal spray, 1 SPRAY IN EACH NOSTRIL DAILY (Patient taking differently: 1 SPRAY IN EACH NOSTRIL DAILY PRN), Disp: 16 g, Rfl: 1;  furosemide (LASIX) 20 MG tablet, Take 1 tablet (20 mg total) by mouth once daily., Disp: 4 tablet, Rfl: 0  gemfibrozil (LOPID) 600 MG tablet, TAKE 1 TABLET BY MOUTH TWICE A DAY, Disp: 60 tablet, Rfl: 5;  (START ON 4/29/2015) hydrocodone-acetaminophen 10-325mg (NORCO)  mg Tab, Take 1 tablet by mouth every 6 (six) hours as needed., Disp: 120 tablet, Rfl: 0;  insulin lispro (HUMALOG) 100 unit/mL injection, Inject 7 Units into the skin 3 (three) times daily before meals., Disp: 6.3 mL, Rfl: 11  insulin syringe-needle U-100 (BD INSULIN SYRINGE ULTRA-FINE) 1/2 mL 31 x 15/64" Syrg, 1 Box by Misc.(Non-Drug; Combo Route) route 4 (four) times daily., Disp: 100 Syringe, Rfl: 12;  lancets (ONE TOUCH DELICA LANCETS) Misc, Ultra 2 lancets to check blood sugar BID, Disp: 100 each, Rfl: 6;  LANTUS SOLOSTAR 100 unit/mL (3 mL) InPn pen, , Disp: , Rfl: 3  LIDODERM 5 %(700 mg/patch), APPLY 1 PATCH TO SKIN DAILY (LEAVING ON FOR 12 HOURS AND OFF FOR 12 HOURS), Disp: 30 patch, Rfl: 6;  magnesium oxide (MAG-OX) 400 mg tablet, TAKE 1 TABLET (400 MG TOTAL) BY MOUTH 2 (TWO) TIMES DAILY., Disp: 60 tablet, Rfl: 11;  methocarbamol (ROBAXIN) 750 MG Tab, TAKE 1 TO 2 TABLETS BY MOUTH 3 TIMES A DAY (Patient taking differently: Take 2 tablets twice daily), Disp: 120 tablet, Rfl: 3  methocarbamol (ROBAXIN) 750 MG Tab, TAKE 1 TO 2 TABLETS BY MOUTH 3 TIMES A " DAY, Disp: 120 tablet, Rfl: 3;  methylPREDNISolone (MEDROL DOSEPACK) 4 mg tablet, use as directed, Disp: 21 tablet, Rfl: 0;  multivitamin with minerals tablet, Take 1 tablet by mouth once daily., Disp: , Rfl: ;  pravastatin (PRAVACHOL) 40 MG tablet, TAKE 1 TABLET BY MOUTH EVERY DAY, Disp: 30 tablet, Rfl: 2  pyridoxine (B-6) 100 MG Tab, Take 1 tablet (100 mg total) by mouth once daily., Disp: 30 tablet, Rfl: 12;  scopolamine (TRANSDERM-SCOP) 1.5 mg, Place 1 patch (1.5 mg total) onto the skin every 72 hours., Disp: 4 patch, Rfl: 0;  sulfamethoxazole-trimethoprim 800-160mg (BACTRIM DS) 800-160 mg Tab, Take 1 tablet by mouth 2 (two) times daily., Disp: , Rfl: 0  sumatriptan (IMITREX) 100 MG tablet, TAKE 1 TABLET (100 MG TOTAL) BY MOUTH ONCE., Disp: 9 tablet, Rfl: 6;  SYNTHROID 125 mcg tablet, TAKE 1 TABLET BY MOUTH DAILY, Disp: 90 tablet, Rfl: 4;  topiramate (TOPAMAX) 100 MG tablet, TAKE 1 TABLET (100 MG TOTAL) BY MOUTH 2 (TWO) TIMES DAILY., Disp: 60 tablet, Rfl: 11;  topiramate (TOPAMAX) 25 MG tablet, Take 4 tablets (100 mg total) by mouth 2 (two) times daily., Disp: 240 tablet, Rfl: 5  venlafaxine (EFFEXOR-XR) 150 MG Cp24, TAKE 1 CAPSULE BY MOUTH ONCE DAILY, Disp: 30 capsule, Rfl: 2;  vitamin D (VITAMIN D3) 185 MG Tab, Take 185 mg by mouth once daily., Disp: , Rfl:           Review of Systems   Constitutional: Negative.  Negative for activity change, appetite change, chills and fever.   HENT: Negative for congestion, facial swelling, mouth sores, rhinorrhea, sore throat and trouble swallowing.    Eyes: Negative for photophobia, discharge and visual disturbance.   Respiratory: Negative for apnea, cough, chest tightness and wheezing.    Cardiovascular: Negative for chest pain and palpitations.   Gastrointestinal: Positive for abdominal pain. Negative for anal bleeding, constipation, diarrhea, nausea and vomiting.        Pain to outer abdomen; to touch.     Genitourinary: Positive for difficulty urinating. Negative for  decreased urine volume, dysuria, flank pain, frequency, hematuria, nocturia, pelvic pain and urgency.   Musculoskeletal: Positive for arthralgias and gait problem. Negative for back pain, neck pain and neck stiffness.   Skin: Positive for rash.        Redness around site.     Neurological: Negative for dizziness, seizures, speech difficulty, weakness and headaches.   Psychiatric/Behavioral: Negative for agitation, confusion, self-injury, sleep disturbance and suicidal ideas. The patient is not nervous/anxious.        Objective:      Physical Exam   Nursing note and vitals reviewed.  Constitutional: She is oriented to person, place, and time. She appears well-developed and well-nourished.   HENT:   Head: Normocephalic.   Right Ear: External ear normal.   Left Ear: External ear normal.   Nose: Nose normal.   Eyes: Conjunctivae and lids are normal. Right eye exhibits no discharge. Left eye exhibits no discharge. No scleral icterus.   Neck: Trachea normal and normal range of motion. Neck supple. No tracheal deviation present.   Cardiovascular: Normal rate, regular rhythm and intact distal pulses.    Pulmonary/Chest: Effort normal. No respiratory distress.   Abdominal: Soft. Bowel sounds are normal. She exhibits no distension and no mass. There is no tenderness. There is no rebound and no guarding.       Musculoskeletal: Normal range of motion. She exhibits no edema.   Neurological: She is alert and oriented to person, place, and time.   Skin: Skin is warm, dry and intact.     Psychiatric: She has a normal mood and affect. Her behavior is normal. Thought content normal.       Assessment:       1. Skin ulceration, limited to breakdown of skin    2. Neurogenic bladder    3. Urinary retention    4. Encounter for suprapubic catheter care        Plan:         I spent 30 minutes with the patient of which more than half was spent in direct consultation with the patient in regards to our treatment and plan.    Education and  recommendations of today's plan of care including home remedies.  Removed old SPT;   Silver nitrate sticks x 3 to granulating tissue;   Tolerated well.  New 16fr SPT easily placed; irrigated bladder to verify the position.  Balloon inflated with 5cc sterile water per request.  Cleaned the surrounding area. SABA to site  Bandage to cover.  We discussed daily care and skin barrier cream; OTC creams advised.  Ulceration/skin breakdown from murillo pressure again skin folds.  RTC one month     FAMILY HISTORY:  Father  Still living? Unknown  FHx: hypothyroidism, Age at diagnosis: Age Unknown     PRINCIPAL DISCHARGE DIAGNOSIS  Diagnosis: Closed fracture of left hip, initial encounter  Assessment and Plan of Treatment: s/p pinning, post op day #4, full weight bearing, stable for discharge  aspirin 81 mg po x 6 weeks, follow up outpatient with ortho surgery - Dr. Shukla for staple removal      SECONDARY DISCHARGE DIAGNOSES  Diagnosis: Pneumonia  Assessment and Plan of Treatment: ceftin 500 mg po daily (renal dosed) x 7 days

## 2022-12-05 NOTE — PROGRESS NOTES
Outpatient Care Management  Plan of Care Follow Up Visit    Patient: Cecile Bowen  MRN: [id number]  Date of Service: 11/21/2022  Completed by: Siena Btoello RN  Referral Date: 06/24/2022    Reason for Visit   Patient presents with    OPCM Chart Review     11/21/22    OPCM RN First Follow-Up Attempt     11/21/22    Update Plan Of Care     12/5/22       Brief Summary: Pt reports her sister still has not done grocery shopping due to transportation. Pt reports they have done research on the grocery delivery options but states they have not tried it as of yet; pt gave no particular reason why. Pt states her sister had a birthday over the weekend, and admits that she and her sister have eaten birthday cake everyday for breakfast, lunch, and dinner in addition to the chinese food. Pt reports she is still avoiding starches such as rice and potatoes, and she is still incorporating the non starchy vegetables. Pt admits her breakfast still consists of the blueberry muffins and waffles but they are almost gone. Pt out of Amovig, called pharmacy on pt's behalf and reordered Amovig, pharmacy stated pt should have medication delivered via [employer] this Wednesday or Thursday; pt verbalized understanding about medication.   Patient Summary     Involvement of Care:  Do I have permission to speak with other family members about your care?  Yes    Patient Reported Labs & Vitals:  1.  Any Patient Reported Labs & Vitals?  Yes  2.  Patient Reported Blood Pressure:     3.  Patient Reported Pulse:     4.  Patient Reported Weight (Kg):     5.  Patient Reported Blood Glucose (mg/dl):  92    Medical and social history was reviewed with patient and/or caregiver.     Clinical Assessment     Reviewed and provided basic information on available community resources for mental health, transportation, wellness resources, and palliative care programs with patient and/or caregiver.     Complex Care Plan     Care plan was discussed and completed today with  input from patient and/or caregiver.    Patient Instructions     Instructions were provided via the [facility] [employer] patient resources and are available for the patient to view on the patient portal.    Next Steps: Confirm pt received Amovig  F/U concerning management of diabetic diet.     Follow up in about 4 weeks (around 12/19/2022).    Todays OPCM Self-Management Care Plan was developed with the patients/caregivers input and was based on identified barriers from todays assessment.  Goals were written today with the patient/caregiver and the patient has agreed to work towards these goals to improve his/her overall well-being. Patient verbalized understanding of the care plan, goals, and all of today's instructions. Encouraged patient/caregiver to communicate with his/her physician and health care team about health conditions and the treatment plan.  Provided my contact information today and encouraged patient/caregiver to call me with any questions as needed.

## 2022-12-13 ENCOUNTER — PATIENT MESSAGE (OUTPATIENT)
Dept: PHYSICAL MEDICINE AND REHAB | Facility: CLINIC | Age: 72
End: 2022-12-13
Payer: MEDICARE

## 2022-12-13 RX ORDER — HYDROCODONE BITARTRATE AND ACETAMINOPHEN 10; 325 MG/1; MG/1
1 TABLET ORAL EVERY 6 HOURS PRN
Qty: 120 TABLET | Refills: 0 | Status: SHIPPED | OUTPATIENT
Start: 2022-12-14 | End: 2022-12-14 | Stop reason: SDUPTHER

## 2022-12-14 RX ORDER — HYDROCODONE BITARTRATE AND ACETAMINOPHEN 10; 325 MG/1; MG/1
1 TABLET ORAL EVERY 6 HOURS PRN
Qty: 120 TABLET | Refills: 0 | Status: SHIPPED | OUTPATIENT
Start: 2022-12-14 | End: 2023-01-05 | Stop reason: SDUPTHER

## 2022-12-19 ENCOUNTER — OUTPATIENT CASE MANAGEMENT (OUTPATIENT)
Dept: ADMINISTRATIVE | Facility: OTHER | Age: 72
End: 2022-12-19
Payer: MEDICARE

## 2023-01-05 ENCOUNTER — OFFICE VISIT (OUTPATIENT)
Dept: PHYSICAL MEDICINE AND REHAB | Facility: CLINIC | Age: 73
End: 2023-01-05
Payer: MEDICARE

## 2023-01-05 VITALS — HEIGHT: 68 IN | BODY MASS INDEX: 34.97 KG/M2

## 2023-01-05 DIAGNOSIS — G89.29 CHRONIC MIDLINE LOW BACK PAIN WITHOUT SCIATICA: Primary | ICD-10-CM

## 2023-01-05 DIAGNOSIS — Z79.891 CHRONIC USE OF OPIATE FOR THERAPEUTIC PURPOSE: ICD-10-CM

## 2023-01-05 DIAGNOSIS — Z78.9 IMPAIRED MOBILITY AND ADLS: ICD-10-CM

## 2023-01-05 DIAGNOSIS — G89.29 CHRONIC MIDLINE THORACIC BACK PAIN: ICD-10-CM

## 2023-01-05 DIAGNOSIS — Z74.09 IMPAIRED MOBILITY AND ADLS: ICD-10-CM

## 2023-01-05 DIAGNOSIS — M54.50 CHRONIC MIDLINE LOW BACK PAIN WITHOUT SCIATICA: Primary | ICD-10-CM

## 2023-01-05 DIAGNOSIS — M75.101 RIGHT ROTATOR CUFF TEAR ARTHROPATHY: ICD-10-CM

## 2023-01-05 DIAGNOSIS — M94.0 COSTOCHONDRITIS: ICD-10-CM

## 2023-01-05 DIAGNOSIS — G89.29 CHRONIC NECK PAIN: ICD-10-CM

## 2023-01-05 DIAGNOSIS — M12.811 RIGHT ROTATOR CUFF TEAR ARTHROPATHY: ICD-10-CM

## 2023-01-05 DIAGNOSIS — M47.816 SPONDYLOSIS OF LUMBAR REGION WITHOUT MYELOPATHY OR RADICULOPATHY: ICD-10-CM

## 2023-01-05 DIAGNOSIS — E66.01 MORBID OBESITY WITH BMI OF 40.0-44.9, ADULT: ICD-10-CM

## 2023-01-05 DIAGNOSIS — R53.81 DEBILITY: ICD-10-CM

## 2023-01-05 DIAGNOSIS — M79.7 FIBROMYALGIA: ICD-10-CM

## 2023-01-05 DIAGNOSIS — M54.2 CHRONIC NECK PAIN: ICD-10-CM

## 2023-01-05 DIAGNOSIS — M47.812 SPONDYLOSIS OF CERVICAL REGION WITHOUT MYELOPATHY OR RADICULOPATHY: ICD-10-CM

## 2023-01-05 DIAGNOSIS — G56.03 BILATERAL CARPAL TUNNEL SYNDROME: ICD-10-CM

## 2023-01-05 DIAGNOSIS — M54.6 CHRONIC MIDLINE THORACIC BACK PAIN: ICD-10-CM

## 2023-01-05 PROCEDURE — 3008F PR BODY MASS INDEX (BMI) DOCUMENTED: ICD-10-PCS | Mod: HCNC,CPTII,S$GLB, | Performed by: PHYSICAL MEDICINE & REHABILITATION

## 2023-01-05 PROCEDURE — 1159F MED LIST DOCD IN RCRD: CPT | Mod: HCNC,CPTII,S$GLB, | Performed by: PHYSICAL MEDICINE & REHABILITATION

## 2023-01-05 PROCEDURE — 3288F FALL RISK ASSESSMENT DOCD: CPT | Mod: HCNC,CPTII,S$GLB, | Performed by: PHYSICAL MEDICINE & REHABILITATION

## 2023-01-05 PROCEDURE — 3072F PR LOW RISK FOR RETINOPATHY: ICD-10-PCS | Mod: HCNC,CPTII,S$GLB, | Performed by: PHYSICAL MEDICINE & REHABILITATION

## 2023-01-05 PROCEDURE — 1159F PR MEDICATION LIST DOCUMENTED IN MEDICAL RECORD: ICD-10-PCS | Mod: HCNC,CPTII,S$GLB, | Performed by: PHYSICAL MEDICINE & REHABILITATION

## 2023-01-05 PROCEDURE — 3008F BODY MASS INDEX DOCD: CPT | Mod: HCNC,CPTII,S$GLB, | Performed by: PHYSICAL MEDICINE & REHABILITATION

## 2023-01-05 PROCEDURE — 99215 PR OFFICE/OUTPT VISIT, EST, LEVL V, 40-54 MIN: ICD-10-PCS | Mod: HCNC,S$GLB,, | Performed by: PHYSICAL MEDICINE & REHABILITATION

## 2023-01-05 PROCEDURE — 1101F PR PT FALLS ASSESS DOC 0-1 FALLS W/OUT INJ PAST YR: ICD-10-PCS | Mod: HCNC,CPTII,S$GLB, | Performed by: PHYSICAL MEDICINE & REHABILITATION

## 2023-01-05 PROCEDURE — 99215 OFFICE O/P EST HI 40 MIN: CPT | Mod: HCNC,S$GLB,, | Performed by: PHYSICAL MEDICINE & REHABILITATION

## 2023-01-05 PROCEDURE — 99999 PR PBB SHADOW E&M-EST. PATIENT-LVL III: ICD-10-PCS | Mod: PBBFAC,HCNC,, | Performed by: PHYSICAL MEDICINE & REHABILITATION

## 2023-01-05 PROCEDURE — 1125F PR PAIN SEVERITY QUANTIFIED, PAIN PRESENT: ICD-10-PCS | Mod: HCNC,CPTII,S$GLB, | Performed by: PHYSICAL MEDICINE & REHABILITATION

## 2023-01-05 PROCEDURE — 3072F LOW RISK FOR RETINOPATHY: CPT | Mod: HCNC,CPTII,S$GLB, | Performed by: PHYSICAL MEDICINE & REHABILITATION

## 2023-01-05 PROCEDURE — 3288F PR FALLS RISK ASSESSMENT DOCUMENTED: ICD-10-PCS | Mod: HCNC,CPTII,S$GLB, | Performed by: PHYSICAL MEDICINE & REHABILITATION

## 2023-01-05 PROCEDURE — 99999 PR PBB SHADOW E&M-EST. PATIENT-LVL III: CPT | Mod: PBBFAC,HCNC,, | Performed by: PHYSICAL MEDICINE & REHABILITATION

## 2023-01-05 PROCEDURE — 1125F AMNT PAIN NOTED PAIN PRSNT: CPT | Mod: HCNC,CPTII,S$GLB, | Performed by: PHYSICAL MEDICINE & REHABILITATION

## 2023-01-05 PROCEDURE — 1101F PT FALLS ASSESS-DOCD LE1/YR: CPT | Mod: HCNC,CPTII,S$GLB, | Performed by: PHYSICAL MEDICINE & REHABILITATION

## 2023-01-05 RX ORDER — HYDROCODONE BITARTRATE AND ACETAMINOPHEN 10; 325 MG/1; MG/1
1 TABLET ORAL EVERY 6 HOURS PRN
Qty: 120 TABLET | Refills: 0 | Status: SHIPPED | OUTPATIENT
Start: 2023-01-13 | End: 2023-01-20 | Stop reason: SDUPTHER

## 2023-01-05 RX ORDER — DULOXETINE 40 MG/1
40 CAPSULE, DELAYED RELEASE ORAL DAILY
Qty: 30 CAPSULE | Refills: 3 | Status: SHIPPED | OUTPATIENT
Start: 2023-01-05 | End: 2023-02-27

## 2023-01-05 NOTE — PROGRESS NOTES
Subjective:       Patient ID: Cecile Bowen is a 72 y.o. female.      Chief Complaint: Pain (Pain management)    HPI     HISTORY OF PRESENT ILLNESS:  Ms. Bowen is a 72-year-old white female with past medical history of HTN, DM, CKD, hypothyroidism, fibromyalgia, status post left mastectomy in 08/2019, morbid obesity and OA.  She also has history of hospitalization for blockage of the urethra with hydronephrosis and multiple medical complications in 06/2021, followed by intensive rehabilitation program but persistent impairment of her activities of daily living and mobility. She was followed up in  the [facility] Medicine Clinic for chronic low back pain due to DJD, status post multiple procedures including RFAs and ESIs, chronic thoracic pain, chronic neck pain, fibromyalgia and bilateral carpal tunnel syndrome.  Her last visit was on 7/30/2021 (a telemedicine audio visit due to COVID-19). She was maintained on venlafaxine, p.r.n. Hydrocodone/APAP, p.r.n. Methocarbamol and diclofenac gel.     The patient was lost to follow-up.  She is coming today to the clinic to reestablish care for her pain.  Her fibromyalgia symptoms including generalized muscle and joint aching, stiffness, and fatigue have been stable.    Her back pain has been worse.  It is a constant throbbing, stabbing and stiffness in the lumbar spine and across her back.  She denies any radiation to her legs. It is worse with activity. Her maximum pain is 9/10 and minimum 6-7/10.  Today, it is 7/10.  Her bilateral lower extremity weakness has been worse for the last 8 months.  She has adult diapers.  She is status post suprapubic catheter due to neurogenic bladder.    Her neck pain has been stable.  It is an intermittent aching pain in the cervical spine.  She denies any radiation to her arms.  Her maximum pain is 8-9/10 and minimum 4/10.  Today, it is 4/10.  The patient denies any upper extremity weakness.  She denies hand numbness.    She has been  complaining of triggering of the right thumb.  She reports that topical Voltaren gel helps some.  She is not interested at this point in referral to the Orthopedic/hand clinic for injections.    The patient continues to require assistance for her activities of daily living including toileting and bathing.  She is dependent for transfers.  She has a manual Ray lift that her sister uses but she is requesting an electric Ray lift due to difficulty operating the manual one.  She has a suprapubic catheter.  She wears adult diapers.      She is currently taking:  - duloxetine 30 mg capsules, 1 capsule daily  - hydrocodone/APAP 10/325 p.r.n. four times per day  - methocarbamol 750 mg p.o. p.r.n., usually 2 tablets 3 times per day  - Voltaren gel topically for painful joints, usually 2-3 daily to her sternum, shoulder and right thumb with good relief      Past Medical History:   Diagnosis Date    Cervicogenic migraine     Chronic pain     CKD (chronic kidney disease) stage 4, GFR 15-29 ml/min     [facility]    CKD (chronic kidney disease) stage 4, GFR 15-29 ml/min     Diabetes mellitus     Long term use of Insulin, Diabetic Neuropathy    Dialysis patient 1/21/2022    Fibromyalgia     Hydronephrosis     Hyperlipidemia     Hypertension 12/12/2012    Hypothyroidism 12/12/2012    ARON (iron deficiency anemia)     Insomnia     Levoscoliosis     Malignant neoplasm of upper-outer quadrant of left breast in female, estrogen receptor positive     Metabolic acidosis     Mobility impaired     Nuclear sclerosis - Both Eyes 3/24/2014    VIJAYA (obstructive sleep apnea)     Osteopenia     Pulmonary nodule     Recurrent UTI     Renal manifestation of secondary diabetes mellitus     Secondary hyperparathyroidism, renal     Urinary retention          Review of Systems   Constitutional:  Positive for chills and fatigue. Negative for fever.   Eyes:  Negative for visual disturbance.   Respiratory:  Negative for shortness of breath.     Cardiovascular:  Positive for chest pain (sternum).   Gastrointestinal:  Positive for nausea. Negative for constipation and vomiting.   Genitourinary:         S/p suprapubic catheter   Musculoskeletal:  Positive for arthralgias, back pain, gait problem and neck pain. Negative for joint swelling.   Neurological:  Positive for weakness and headaches. Negative for dizziness.   Psychiatric/Behavioral:  Positive for sleep disturbance. Negative for behavioral problems.      Objective:      Physical Exam  Vitals reviewed.   Constitutional:       Appearance: She is well-developed.      Comments: Coming to the clinic in a manual wheelchair   HENT:      Head: Normocephalic and atraumatic.   Eyes:      Extraocular Movements: Extraocular movements intact.   Musculoskeletal:      Cervical back: No tenderness.      Comments: BUE:  ROM:   RUE: full.   LUE: full.  Strength:    RUE: 3-/5 at shoulder abduction, 5- elbow flexion, 5- elbow extension, 5- hand .   LUE: 4/5 at shoulder abduction, 5- elbow flexion, 5- elbow extension, 5- hand .  Sensation to pinprick:   RUE: intact.   LUE: intact.  DTR:    RUE: +1 biceps, +1 triceps.   LUE:  +1 biceps, +1 triceps.    BLE:  ROM:   RLE: increased tone.   LLE: increased tone.   Strength:    RLE: 4-/5 at hip flexion, 4 knee extension, 4 ankle DF, 4 PF.   LLE: 4/5 at hip flexion, 4 knee extension, 4 ankle DF, 4 PF.  Sensation to pinprick:     RLE: intact.      LLE: intact.   DTR:     RLE: +1 knee, +1 ankle.    LLE: +1 knee, +1 ankle.  SLR (sitting):      RLE: -ve.      LLE: -ve.       Skin:     General: Skin is warm.   Neurological:      General: No focal deficit present.      Mental Status: She is alert.   Psychiatric:         Behavior: Behavior normal.           Assessment:       1. Chronic midline low back pain without sciatica    2. Spondylosis of lumbar region without myelopathy or radiculopathy    3. Chronic neck pain    4. Spondylosis of cervical region without myelopathy or  radiculopathy    5. Chronic midline thoracic back pain    6. Fibromyalgia    7. Costochondritis    8. Bilateral carpal tunnel syndrome    9. Right rotator cuff tear arthropathy    10. Morbid obesity with BMI of 40.0-44.9, adult    11. Chronic use of opiate for therapeutic purpose        Plan:       - NSAIDs will be avoided due to CKD.  - Increase DULoxetine 40 mg CpDR; Take 40 mg by mouth once daily.  - Coontinue HYDROcodone-acetaminophen (NORCO)  mg per tablet; Take 1 tablet by mouth every 6 (six) hours as needed for Pain.  - Continue methocarbamol (ROBAXIN) 750 MG Tab; Take 1-2 tablets (750-1,500 mg total) by mouth 3 (three) times daily as needed.  - Continue diclofenac sodium (VOLTAREN) 1 % Gel; Apply 2 g topically 3-4 times daily p.r.n. (to costochondral areas and right shoulder).  - She can call for a referral to the hand clinic for right trigger thumb injection.  - Pain Clinic Drug Screen; Future  - A prescription for a power Ray lift was generated and will be faxed to a DME vendor.  It is reportedly not covered by her insurance (only a manual lift was covered) but she is willing to pay cash for it to reduce the stress on her sister.  - Follow up in about 4 months (around 5/5/2023).         This was a 40 minute visit (including review of records), 50% of which was spent educating the patient about the diagnosis and the treatment plan.      This note was partly generated with [employer] voice recognition software. I apologize for any possible typographical errors.

## 2023-01-06 NOTE — TELEPHONE ENCOUNTER
The patient needed a power Ray lift.  She said her insurance does not cover it but she is willing to pay cash for it.  She could not find a DME company to sell it.  I called today Mahesh.  They have it.  Told him I will fax a prescription.  I called the patient informed her.  It will hopefully work out a payment plan with the patient.

## 2023-01-10 ENCOUNTER — PATIENT MESSAGE (OUTPATIENT)
Dept: INTERNAL MEDICINE | Facility: CLINIC | Age: 73
End: 2023-01-10
Payer: MEDICARE

## 2023-01-10 DIAGNOSIS — Z79.4 TYPE 2 DIABETES MELLITUS WITH DIABETIC NEUROPATHY, WITH LONG-TERM CURRENT USE OF INSULIN: Primary | ICD-10-CM

## 2023-01-10 DIAGNOSIS — E11.40 TYPE 2 DIABETES MELLITUS WITH DIABETIC NEUROPATHY, WITH LONG-TERM CURRENT USE OF INSULIN: Primary | ICD-10-CM

## 2023-01-10 NOTE — TELEPHONE ENCOUNTER
I see that the pt was given nifedipine 30 mg in the hospital in early 2022. She was then d/c with nifedipine 60 mg in 2/2022 for only one month. I do not see where she was prescribed more. I do not see where she has f/u with cardiology.   Please advise.

## 2023-01-11 NOTE — PATIENT INSTRUCTIONS
It is recommended that  blood pressure be checked 3-4 times a week and a log  maintained . Call if blood pressure 130/ or greater consistently.  Mediterranean Diet recommended with carbohydrate restriction  As much activity as tolerated often during the day suggested    used

## 2023-01-12 ENCOUNTER — OUTPATIENT CASE MANAGEMENT (OUTPATIENT)
Dept: ADMINISTRATIVE | Facility: OTHER | Age: 73
End: 2023-01-12
Payer: MEDICARE

## 2023-01-13 NOTE — TELEPHONE ENCOUNTER
Appears it was d/c'd because her BP was too low,  and likely she continued to take as she had refills left on the prescription      If HH is still seeing her, are there updated BP readings?    If her BP is elevated w/o the medication a new Rx will be escripted

## 2023-01-18 ENCOUNTER — TELEPHONE (OUTPATIENT)
Dept: INTERNAL MEDICINE | Facility: CLINIC | Age: 73
End: 2023-01-18
Payer: MEDICARE

## 2023-01-18 ENCOUNTER — TELEPHONE (OUTPATIENT)
Dept: HEMATOLOGY/ONCOLOGY | Facility: CLINIC | Age: 73
End: 2023-01-18
Payer: MEDICARE

## 2023-01-18 DIAGNOSIS — Z12.31 VISIT FOR SCREENING MAMMOGRAM: Primary | ICD-10-CM

## 2023-01-18 NOTE — TELEPHONE ENCOUNTER
----- Message from Siena Botello RN sent at 1/18/2023 11:50 AM CST -----  Regarding: Mammogram  Contact: Siena Hernandez    This is Siena with Outpatient Case Management. Pt is requesting an appt for a mammogram, may we please have orders placed so that she can have it done at the [facility]. Thank You    GUILLE Izquierdo, RN  [facility]  [email or web address]  TEL:  [phone]

## 2023-01-18 NOTE — TELEPHONE ENCOUNTER
----- Message from Siena Botello RN sent at 1/18/2023  4:26 PM CST -----  Regarding: Appointment  Contact: Seina Hernandez    This is Siena with Outpatient Case Management. Pt would like to have an appt with Dr. Keating for f/u because she states she has not been seen in a while. Pt would preferably like to have the appt on March 8th because she already has a mammogram scheduled for this day at   10 AM. Please Advise. Thank You    GUILLE Izquierdo, RN  [facility]  [email or web address]  TEL:  [phone]

## 2023-01-18 NOTE — TELEPHONE ENCOUNTER
----- Message from Ritika Romero sent at 2/21/2022  2:44 PM CST -----  Regarding: appointment  Contact: patient  Type:  Sooner Apoointment Request    Caller is requesting a sooner appointment.  Caller declined first available appointment listed below.  Caller will not accept being placed on the waitlist and is requesting a message be sent to doctor.    Name of Caller:  patient  When is the first available appointment?    Symptoms:  discharged 02/23/22 [facility] dx blood pressure low, blood infection  Best Call Back Number:  [phone]  Additional Information:  patient needs to be seen in one week. Please call patient. Thanks!       L hip pain

## 2023-01-18 NOTE — PROGRESS NOTES
Outpatient Care Management  Plan of Care Follow Up Visit    Patient: Cecile Bowen  MRN: [id number]  Date of Service: 12/30/2023  Completed by: Siena Botello RN  Referral Date: 06/24/2022    Reason for Visit   Patient presents with    OPCM Chart Review     12/19/22    OPCM RN First Follow-Up Attempt     12/19/22    Update Plan Of Care     12/30/22       Brief Summary: Pt reports that she has been checking and recording BS as ordered daily. Pt reports reading 138 today. Pt reports she and her sister have been mostly eating take out such as chinese foods, but pt states she eats the protein such as shrimp with mixed vegetables. Pt reports she has been limiting starches but admits she occasionally will have a meal that includes white rice, but she eats a minimal amount. Pt report she has not consumed any excess sweets such as cake and ice cream. Reinforced with pt to avoid carbs such as white rice and try brown rice, and replace starches with extra vegetables and a protein such as grilled/baked/broiled chicken or fish. Pt reports sister has been able to grocery shop but admits she is still eating breakfast foods such as the waffles and muffins. Reinforced breakfast food options for diabetes with pt.   Next Steps: F/U concerning management of diabetes and diet.

## 2023-01-18 NOTE — TELEPHONE ENCOUNTER
----- Message from Taryn Jiménez sent at 1/18/2023  1:57 PM CST -----  Contact: [phone] pt  Pt is needing an order for Home health aide to come out and give her a bath and wash her hair once every other week. Per pt, please call to discuss this matter further          Thank you

## 2023-01-19 ENCOUNTER — LAB VISIT (OUTPATIENT)
Dept: LAB | Facility: HOSPITAL | Age: 73
End: 2023-01-19
Attending: NURSE PRACTITIONER
Payer: MEDICARE

## 2023-01-19 ENCOUNTER — PATIENT MESSAGE (OUTPATIENT)
Dept: PHYSICAL MEDICINE AND REHAB | Facility: CLINIC | Age: 73
End: 2023-01-19
Payer: MEDICARE

## 2023-01-19 DIAGNOSIS — Z43.5 ATTENTION TO CYSTOSTOMY: Primary | ICD-10-CM

## 2023-01-19 DIAGNOSIS — M54.50 CHRONIC MIDLINE LOW BACK PAIN WITHOUT SCIATICA: ICD-10-CM

## 2023-01-19 DIAGNOSIS — G89.29 CHRONIC NECK PAIN: ICD-10-CM

## 2023-01-19 DIAGNOSIS — M54.2 CHRONIC NECK PAIN: ICD-10-CM

## 2023-01-19 DIAGNOSIS — G89.29 CHRONIC MIDLINE LOW BACK PAIN WITHOUT SCIATICA: ICD-10-CM

## 2023-01-19 LAB
ESTIMATED AVG GLUCOSE: 111 MG/DL (ref 68–131)
HBA1C MFR BLD: 5.5 % (ref 4–5.6)

## 2023-01-19 PROCEDURE — 36415 COLL VENOUS BLD VENIPUNCTURE: CPT | Mod: HCNC | Performed by: NURSE PRACTITIONER

## 2023-01-19 PROCEDURE — 83036 HEMOGLOBIN GLYCOSYLATED A1C: CPT | Mod: HCNC | Performed by: NURSE PRACTITIONER

## 2023-01-19 NOTE — TELEPHONE ENCOUNTER
Spoke to [facility]. They stated that the pt must be seen within 90 days before ordering, or within 30 days after order HH.

## 2023-01-19 NOTE — TELEPHONE ENCOUNTER
Thought it was 90 days, either pre- order or have 90 days to see pt after the order is given  Please clarify w/ HH  If that is correct the order can be given but she will need an appt in the next 90 days

## 2023-01-20 ENCOUNTER — OUTPATIENT CASE MANAGEMENT (OUTPATIENT)
Dept: ADMINISTRATIVE | Facility: OTHER | Age: 73
End: 2023-01-20
Payer: MEDICARE

## 2023-01-20 RX ORDER — HYDROCODONE BITARTRATE AND ACETAMINOPHEN 10; 325 MG/1; MG/1
1 TABLET ORAL EVERY 6 HOURS PRN
Qty: 120 TABLET | Refills: 0 | Status: SHIPPED | OUTPATIENT
Start: 2023-01-20 | End: 2023-02-23 | Stop reason: SDUPTHER

## 2023-01-20 NOTE — PROGRESS NOTES
Outpatient Care Management   - Care Plan Follow Up    Patient: Cecile Bowen  MRN:  [id number]  Date of Service:  1/20/2023  Completed by:  Denisse Fitzgerald LCSW  Referral Date: 06/24/2022    Reason for Visit   Patient presents with    OPCM Chart Review    Case Closure    OPCM SW FOLLOW-UP     1/20/2023    Brief Summary: SW telephoned the pt and discussed case closure. Pt is in agreement with case closure.     Complex Care Plan     Care plan was discussed and completed today with input from patient and/or caregiver.    Patient Instructions     No follow-ups on file.

## 2023-01-20 NOTE — TELEPHONE ENCOUNTER
Spoke to pt. Pt rescheduled from March visit to 1/30/23 at 3:30 PM with Corky Campbell and Julita.

## 2023-01-25 ENCOUNTER — OUTPATIENT CASE MANAGEMENT (OUTPATIENT)
Dept: ADMINISTRATIVE | Facility: OTHER | Age: 73
End: 2023-01-25
Payer: MEDICARE

## 2023-01-30 ENCOUNTER — OFFICE VISIT (OUTPATIENT)
Dept: INTERNAL MEDICINE | Facility: CLINIC | Age: 73
End: 2023-01-30
Payer: MEDICARE

## 2023-01-30 VITALS
HEIGHT: 68 IN | BODY MASS INDEX: 34.97 KG/M2 | SYSTOLIC BLOOD PRESSURE: 132 MMHG | OXYGEN SATURATION: 99 % | TEMPERATURE: 98 F | RESPIRATION RATE: 19 BRPM | DIASTOLIC BLOOD PRESSURE: 80 MMHG | HEART RATE: 110 BPM

## 2023-01-30 DIAGNOSIS — Z00.00 ANNUAL PHYSICAL EXAM: Primary | ICD-10-CM

## 2023-01-30 DIAGNOSIS — I70.0 AORTIC ATHEROSCLEROSIS: ICD-10-CM

## 2023-01-30 DIAGNOSIS — N25.81 SECONDARY HYPERPARATHYROIDISM, RENAL: ICD-10-CM

## 2023-01-30 DIAGNOSIS — E11.22 CKD STAGE 4 DUE TO TYPE 2 DIABETES MELLITUS: ICD-10-CM

## 2023-01-30 DIAGNOSIS — C50.612 MALIGNANT NEOPLASM OF AXILLARY TAIL OF LEFT BREAST IN FEMALE, ESTROGEN RECEPTOR POSITIVE: ICD-10-CM

## 2023-01-30 DIAGNOSIS — Z23 NEED FOR PNEUMOCOCCAL VACCINE: ICD-10-CM

## 2023-01-30 DIAGNOSIS — F11.20 UNCOMPLICATED OPIOID DEPENDENCE: ICD-10-CM

## 2023-01-30 DIAGNOSIS — E11.40 TYPE 2 DIABETES MELLITUS WITH DIABETIC NEUROPATHY, WITH LONG-TERM CURRENT USE OF INSULIN: ICD-10-CM

## 2023-01-30 DIAGNOSIS — N18.4 CKD STAGE 4 DUE TO TYPE 2 DIABETES MELLITUS: ICD-10-CM

## 2023-01-30 DIAGNOSIS — Z78.0 POSTMENOPAUSAL ESTROGEN DEFICIENCY: ICD-10-CM

## 2023-01-30 DIAGNOSIS — D64.3 SIDEROBLASTIC ANEMIA: ICD-10-CM

## 2023-01-30 DIAGNOSIS — F33.1 MAJOR DEPRESSIVE DISORDER, RECURRENT EPISODE, MODERATE: ICD-10-CM

## 2023-01-30 DIAGNOSIS — R53.81 DEBILITY: Chronic | ICD-10-CM

## 2023-01-30 DIAGNOSIS — E03.9 HYPOTHYROIDISM, UNSPECIFIED TYPE: ICD-10-CM

## 2023-01-30 DIAGNOSIS — Z17.0 MALIGNANT NEOPLASM OF AXILLARY TAIL OF LEFT BREAST IN FEMALE, ESTROGEN RECEPTOR POSITIVE: ICD-10-CM

## 2023-01-30 DIAGNOSIS — Z79.4 TYPE 2 DIABETES MELLITUS WITH DIABETIC NEUROPATHY, WITH LONG-TERM CURRENT USE OF INSULIN: ICD-10-CM

## 2023-01-30 DIAGNOSIS — Z12.11 COLON CANCER SCREENING: ICD-10-CM

## 2023-01-30 PROCEDURE — 90694 FLU VACCINE - QUADRIVALENT - ADJUVANTED: ICD-10-PCS | Mod: HCNC,S$GLB,, | Performed by: INTERNAL MEDICINE

## 2023-01-30 PROCEDURE — 3075F SYST BP GE 130 - 139MM HG: CPT | Mod: HCNC,CPTII,GC,S$GLB | Performed by: STUDENT IN AN ORGANIZED HEALTH CARE EDUCATION/TRAINING PROGRAM

## 2023-01-30 PROCEDURE — 1160F PR REVIEW ALL MEDS BY PRESCRIBER/CLIN PHARMACIST DOCUMENTED: ICD-10-PCS | Mod: HCNC,CPTII,GC,S$GLB | Performed by: STUDENT IN AN ORGANIZED HEALTH CARE EDUCATION/TRAINING PROGRAM

## 2023-01-30 PROCEDURE — 1125F AMNT PAIN NOTED PAIN PRSNT: CPT | Mod: HCNC,CPTII,GC,S$GLB | Performed by: STUDENT IN AN ORGANIZED HEALTH CARE EDUCATION/TRAINING PROGRAM

## 2023-01-30 PROCEDURE — 99397 PER PM REEVAL EST PAT 65+ YR: CPT | Mod: 25,HCNC,GC,S$GLB | Performed by: STUDENT IN AN ORGANIZED HEALTH CARE EDUCATION/TRAINING PROGRAM

## 2023-01-30 PROCEDURE — 99999 PR PBB SHADOW E&M-EST. PATIENT-LVL V: CPT | Mod: PBBFAC,HCNC,GC, | Performed by: STUDENT IN AN ORGANIZED HEALTH CARE EDUCATION/TRAINING PROGRAM

## 2023-01-30 PROCEDURE — 3075F PR MOST RECENT SYSTOLIC BLOOD PRESS GE 130-139MM HG: ICD-10-PCS | Mod: HCNC,CPTII,GC,S$GLB | Performed by: STUDENT IN AN ORGANIZED HEALTH CARE EDUCATION/TRAINING PROGRAM

## 2023-01-30 PROCEDURE — 90694 VACC AIIV4 NO PRSRV 0.5ML IM: CPT | Mod: HCNC,S$GLB,, | Performed by: INTERNAL MEDICINE

## 2023-01-30 PROCEDURE — 3044F PR MOST RECENT HEMOGLOBIN A1C LEVEL <7.0%: ICD-10-PCS | Mod: HCNC,CPTII,GC,S$GLB | Performed by: STUDENT IN AN ORGANIZED HEALTH CARE EDUCATION/TRAINING PROGRAM

## 2023-01-30 PROCEDURE — 1160F RVW MEDS BY RX/DR IN RCRD: CPT | Mod: HCNC,CPTII,GC,S$GLB | Performed by: STUDENT IN AN ORGANIZED HEALTH CARE EDUCATION/TRAINING PROGRAM

## 2023-01-30 PROCEDURE — 3288F PR FALLS RISK ASSESSMENT DOCUMENTED: ICD-10-PCS | Mod: HCNC,CPTII,GC,S$GLB | Performed by: STUDENT IN AN ORGANIZED HEALTH CARE EDUCATION/TRAINING PROGRAM

## 2023-01-30 PROCEDURE — 1125F PR PAIN SEVERITY QUANTIFIED, PAIN PRESENT: ICD-10-PCS | Mod: HCNC,CPTII,GC,S$GLB | Performed by: STUDENT IN AN ORGANIZED HEALTH CARE EDUCATION/TRAINING PROGRAM

## 2023-01-30 PROCEDURE — 3044F HG A1C LEVEL LT 7.0%: CPT | Mod: HCNC,CPTII,GC,S$GLB | Performed by: STUDENT IN AN ORGANIZED HEALTH CARE EDUCATION/TRAINING PROGRAM

## 2023-01-30 PROCEDURE — 99397 PR PREVENTIVE VISIT,EST,65 & OVER: ICD-10-PCS | Mod: 25,HCNC,GC,S$GLB | Performed by: STUDENT IN AN ORGANIZED HEALTH CARE EDUCATION/TRAINING PROGRAM

## 2023-01-30 PROCEDURE — 3288F FALL RISK ASSESSMENT DOCD: CPT | Mod: HCNC,CPTII,GC,S$GLB | Performed by: STUDENT IN AN ORGANIZED HEALTH CARE EDUCATION/TRAINING PROGRAM

## 2023-01-30 PROCEDURE — 3008F PR BODY MASS INDEX (BMI) DOCUMENTED: ICD-10-PCS | Mod: HCNC,CPTII,GC,S$GLB | Performed by: STUDENT IN AN ORGANIZED HEALTH CARE EDUCATION/TRAINING PROGRAM

## 2023-01-30 PROCEDURE — 3008F BODY MASS INDEX DOCD: CPT | Mod: HCNC,CPTII,GC,S$GLB | Performed by: STUDENT IN AN ORGANIZED HEALTH CARE EDUCATION/TRAINING PROGRAM

## 2023-01-30 PROCEDURE — 1159F PR MEDICATION LIST DOCUMENTED IN MEDICAL RECORD: ICD-10-PCS | Mod: HCNC,CPTII,GC,S$GLB | Performed by: STUDENT IN AN ORGANIZED HEALTH CARE EDUCATION/TRAINING PROGRAM

## 2023-01-30 PROCEDURE — 3079F DIAST BP 80-89 MM HG: CPT | Mod: HCNC,CPTII,GC,S$GLB | Performed by: STUDENT IN AN ORGANIZED HEALTH CARE EDUCATION/TRAINING PROGRAM

## 2023-01-30 PROCEDURE — 3072F LOW RISK FOR RETINOPATHY: CPT | Mod: HCNC,CPTII,GC,S$GLB | Performed by: STUDENT IN AN ORGANIZED HEALTH CARE EDUCATION/TRAINING PROGRAM

## 2023-01-30 PROCEDURE — G0009 ADMIN PNEUMOCOCCAL VACCINE: HCPCS | Mod: HCNC,S$GLB,, | Performed by: INTERNAL MEDICINE

## 2023-01-30 PROCEDURE — 90732 PNEUMOCOCCAL POLYSACCHARIDE VACCINE 23-VALENT =>2YO SQ IM: ICD-10-PCS | Mod: HCNC,S$GLB,, | Performed by: INTERNAL MEDICINE

## 2023-01-30 PROCEDURE — 1100F PTFALLS ASSESS-DOCD GE2>/YR: CPT | Mod: HCNC,CPTII,GC,S$GLB | Performed by: STUDENT IN AN ORGANIZED HEALTH CARE EDUCATION/TRAINING PROGRAM

## 2023-01-30 PROCEDURE — 3072F PR LOW RISK FOR RETINOPATHY: ICD-10-PCS | Mod: HCNC,CPTII,GC,S$GLB | Performed by: STUDENT IN AN ORGANIZED HEALTH CARE EDUCATION/TRAINING PROGRAM

## 2023-01-30 PROCEDURE — G0009 PNEUMOCOCCAL POLYSACCHARIDE VACCINE 23-VALENT =>2YO SQ IM: ICD-10-PCS | Mod: HCNC,S$GLB,, | Performed by: INTERNAL MEDICINE

## 2023-01-30 PROCEDURE — 90732 PPSV23 VACC 2 YRS+ SUBQ/IM: CPT | Mod: HCNC,S$GLB,, | Performed by: INTERNAL MEDICINE

## 2023-01-30 PROCEDURE — G0008 FLU VACCINE - QUADRIVALENT - ADJUVANTED: ICD-10-PCS | Mod: HCNC,S$GLB,, | Performed by: INTERNAL MEDICINE

## 2023-01-30 PROCEDURE — 1159F MED LIST DOCD IN RCRD: CPT | Mod: HCNC,CPTII,GC,S$GLB | Performed by: STUDENT IN AN ORGANIZED HEALTH CARE EDUCATION/TRAINING PROGRAM

## 2023-01-30 PROCEDURE — 1100F PR PT FALLS ASSESS DOC 2+ FALLS/FALL W/INJURY/YR: ICD-10-PCS | Mod: HCNC,CPTII,GC,S$GLB | Performed by: STUDENT IN AN ORGANIZED HEALTH CARE EDUCATION/TRAINING PROGRAM

## 2023-01-30 PROCEDURE — 99999 PR PBB SHADOW E&M-EST. PATIENT-LVL V: ICD-10-PCS | Mod: PBBFAC,HCNC,GC, | Performed by: STUDENT IN AN ORGANIZED HEALTH CARE EDUCATION/TRAINING PROGRAM

## 2023-01-30 PROCEDURE — G0008 ADMIN INFLUENZA VIRUS VAC: HCPCS | Mod: HCNC,S$GLB,, | Performed by: INTERNAL MEDICINE

## 2023-01-30 PROCEDURE — 3079F PR MOST RECENT DIASTOLIC BLOOD PRESSURE 80-89 MM HG: ICD-10-PCS | Mod: HCNC,CPTII,GC,S$GLB | Performed by: STUDENT IN AN ORGANIZED HEALTH CARE EDUCATION/TRAINING PROGRAM

## 2023-01-30 NOTE — PROGRESS NOTES
INTERNAL MEDICINE RESIDENT CLINIC  CLINIC NOTE    CC: Annual PE    Ms. Cecile Bowen is a 72 y.o. female w/ an active medical problem list including Cervicogenic Migraine, neurogenic bladder with suprapubic catheter, MDD, On chronic opioid, HLD, T2DM on insulin, Afib, CKD4, Hypthyroidism, MO, VIJAYA presents for PE. She has a home health nurse that would come in once a month. Pt was asking if she needs refills for nifedipine, has not been taking it for a while, BP at home per pt has been around 130s/80s. BP is good today in the office. Would hold off for now.     Non smoker  NonETOH      HEALTH MAINTENANCE:  Cholesterol/labs: due   C-scope: due   Mammo:7/8/2019, due, scheduled for 3/08  DEXA: 10/2019, due, osteopenia   EYE exam: last month, cataracts are small   DDS exam: pt does not have teeth   TD/TDAP: due   Flu:due   Pneumovax:due   Shingles: due       MEDCARD: Reviewed  Review of Systems   Constitutional:  Negative for chills, fever, malaise/fatigue and weight loss.   HENT:  Positive for congestion. Negative for hearing loss.    Eyes:  Negative for photophobia.   Respiratory:  Negative for cough, sputum production and shortness of breath.    Cardiovascular:  Positive for leg swelling. Negative for chest pain and palpitations.   Gastrointestinal:  Negative for abdominal pain, constipation and diarrhea.   Genitourinary:  Negative for hematuria and urgency.   Musculoskeletal:  Negative for joint pain and myalgias.   Neurological:  Positive for weakness. Negative for headaches.         ADLs: no limitations, lives with sister. Sister helps her out with obtaining/cooking food. Has not been washing her hair for 9 months.   Advance directive, living will: Yes   Memory: from time to time, normal aging   Mental health: No issue with depression, or anxiety   Safety: does not feel safe at home due to immobility issue   Gait: as baseline, in a wheelchair   Diet: whatever her sister cooks for her. No vegetables, mostly  meats.  Urinary incontinence: has chronic indwelling murillo     Remainder of review negative except as previously noted    PMHX:  Past Medical History:   Diagnosis Date    Cervicogenic migraine     Chronic pain     CKD (chronic kidney disease) stage 4, GFR 15-29 ml/min     [facility]    CKD (chronic kidney disease) stage 4, GFR 15-29 ml/min     Diabetes mellitus     Long term use of Insulin, Diabetic Neuropathy    Dialysis patient 1/21/2022    Fibromyalgia     Hydronephrosis     Hyperlipidemia     Hypertension 12/12/2012    Hypothyroidism 12/12/2012    ARON (iron deficiency anemia)     Insomnia     Levoscoliosis     Malignant neoplasm of upper-outer quadrant of left breast in female, estrogen receptor positive     Metabolic acidosis     Mobility impaired     Nuclear sclerosis - Both Eyes 3/24/2014    VIJAYA (obstructive sleep apnea)     Osteopenia     Pulmonary nodule     Recurrent UTI     Renal manifestation of secondary diabetes mellitus     Secondary hyperparathyroidism, renal     Urinary retention        PSHX:   Past Surgical History:   Procedure Laterality Date    BIOPSY OF URETER Left 2/18/2022    Procedure: BIOPSY, URETER;  Surgeon: Ronel Whittington MD;  Location: [facility] OR [address] [place] [address];  Service: Urology;  Laterality: Left;    BREAST BIOPSY Right     benign    CHOLECYSTECTOMY      COLONOSCOPY N/A 1/13/2017    Procedure: COLONOSCOPY;  Surgeon: Morris Wiseman MD;  Location: [facility] (4TH FLR);  Service: Endoscopy;  Laterality: N/A;  Renal pt Nephrology advised to avoid phosphate preps    CYSTOSCOPY N/A 10/8/2018    Procedure: CYSTOSCOPY;  Surgeon: Ronel Whittington MD;  Location: [facility] OR [address] [place] [address];  Service: Urology;  Laterality: N/A;  45 min    CYSTOSCOPY N/A 3/25/2019    Procedure: CYSTOSCOPY;  Surgeon: Ronel Whittington MD;  Location: [address];  Service: Urology;  Laterality: N/A;  45 min    CYSTOSCOPY N/A 8/26/2019    Procedure: CYSTOSCOPY;  Surgeon: Ronel Whittington MD;  Location:  [facility] OR 1ST FLR;  Service: Urology;  Laterality: N/A;  45 min    CYSTOSCOPY N/A 7/2/2021    Procedure: CYSTOSCOPY;  Surgeon: Ronel Whittington MD;  [facility];  Service: Urology;  Laterality: N/A;    CYSTOSCOPY  12/23/2021    Procedure: CYSTOSCOPY;  Surgeon: Ronel Whittington MD;  [facility];  Service: Urology;;    CYSTOSCOPY  2/18/2022    Procedure: CYSTOSCOPY;  Surgeon: Ronel Whittington MD;  [facility];  Service: Urology;;    CYSTOSCOPY W/ URETERAL STENT PLACEMENT Left 5/15/2021    Procedure: CYSTOSCOPY, WITH URETERAL STENT INSERTION;  Surgeon: Levy Sánchez Jr., MD;  Location: [facility] OR [address] [place] [address];  Service: Urology;  Laterality: Left;    CYSTOSCOPY WITH BIOPSY OF BLADDER N/A 1/27/2020    Procedure: CYSTOSCOPY, WITH BLADDER BIOPSY, WITH FULGURATION IF INDICATED;  Surgeon: Ronel Whittington MD;  Location: [facility] OR [address] [place] [address];  Service: Urology;  Laterality: N/A;    DILATION AND CURETTAGE OF UTERUS      GALLBLADDER SURGERY  2006    HYSTERECTOMY      INJECTION FOR SENTINEL NODE IDENTIFICATION Left 8/1/2019    Procedure: INJECTION, FOR SENTINEL NODE IDENTIFICATION;  Surgeon: Samia Fulton MD;  Location: [facility] OR [address] [place] [address];  Service: General;  Laterality: Left;    INJECTION OF BOTULINUM TOXIN TYPE A N/A 10/8/2018    Procedure: INJECTION, BOTULINUM TOXIN, TYPE A 300 UNITS;  Surgeon: Ronel Whittington MD;  Location: [facility] OR [address] [place] [address];  Service: Urology;  Laterality: N/A;    INJECTION OF BOTULINUM TOXIN TYPE A N/A 3/25/2019    Procedure: INJECTION, BOTULINUM TOXIN, TYPE A 300 UNITS;  Surgeon: Ronel Whittington MD;  Location: [facility] OR [address] [place] [address];  Service: Urology;  Laterality: N/A;    INJECTION OF BOTULINUM TOXIN TYPE A N/A 8/26/2019    Procedure: INJECTION, BOTULINUM TOXIN, TYPE A 300 UNITS;  Surgeon: Ronel Whittington MD;  Location: [facility] OR [address] [place] [address];  Service: Urology;  Laterality: N/A;    MASTECTOMY Left 8/1/2019    Procedure: MASTECTOMY 23 hour stay;   Surgeon: Samia Fulton MD;  [facility];  Service: General;  Laterality: Left;    MEDIPORT REMOVAL Right 6/24/2022    Procedure: REMOVAL, CATHETER, CENTRAL VENOUS, TUNNELED, WITH PORT;  Surgeon: Isauro Anderson MD;  Location: [facility] CATH LAB;  Service: Radiology;  Laterality: Right;    OVARIAN CYST SURGERY  1985    REPLACEMENT OF STENT Left 12/23/2021    Procedure: REPLACEMENT, STENT;  Surgeon: Ronel Whittington MD;  [facility];  Service: Urology;  Laterality: Left;    REPLACEMENT OF STENT Left 2/18/2022    Procedure: REPLACEMENT, STENT;  Surgeon: Ronel Whittington MD;  Location: [facility] OR [address] [place] [address];  Service: Urology;  Laterality: Left;    RETROGRADE PYELOGRAPHY Left 2/18/2022    Procedure: PYELOGRAM, RETROGRADE;  Surgeon: Ronel Whittington MD;  Location: [facility] OR [address] [place] [address];  Service: Urology;  Laterality: Left;    SENTINEL LYMPH NODE BIOPSY Left 8/1/2019    Procedure: BIOPSY, LYMPH NODE, SENTINEL;  Surgeon: Samia Fulton MD;  Location: [facility] OR [address] [place] [address];  Service: General;  Laterality: Left;    spt placement      TONSILLECTOMY, ADENOIDECTOMY      TOTAL ABDOMINAL HYSTERECTOMY W/ BILATERAL SALPINGOOPHORECTOMY  1985    URETEROSCOPY Left 2/18/2022    Procedure: URETEROSCOPY;  Surgeon: Ronel Whittington MD;  Location: [facility] OR [address] [place] [address];  Service: Urology;  Laterality: Left;       SHX:   Social Connections: Socially Isolated    Frequency of Communication with Friends and Family: Twice a week    Frequency of Social Gatherings with Friends and Family: Never    Attends L[employer] Services: Never    Active Member of Clubs or Organizations: Yes    Attends Club or Organization Meetings: Never    Marital Status:          PE:  Physical Exam  Constitutional:       Appearance: She is obese. She is ill-appearing.   HENT:      Head: Normocephalic and atraumatic.   Eyes:      General:         Right eye: No discharge.         Left eye: No discharge.   Cardiovascular:      Rate and  Rhythm: Normal rate and regular rhythm.      Heart sounds: Normal heart sounds.   Pulmonary:      Effort: Pulmonary effort is normal.      Breath sounds: Normal breath sounds. No rales.   Abdominal:      General: Bowel sounds are normal.      Palpations: Abdomen is soft.      Tenderness: There is no abdominal tenderness. There is no rebound.   Musculoskeletal:         General: No deformity.      Cervical back: Normal range of motion and neck supple.      Right lower leg: Edema present.      Left lower leg: Edema present.   Skin:     General: Skin is warm and dry.   Neurological:      Mental Status: She is alert and oriented to person, place, and time.      Motor: Weakness present.   Psychiatric:         Mood and Affect: Affect normal.         Judgment: Judgment normal.      Protective Sensation (w/ 10 gram monofilament):  Right: Intact  Left: Intact    Visual Inspection:  Nails Intact - without Evidence of Foot Deformity- Bilateral and Dry Skin -  Bilateral    Pedal Pulses:   Right: Present  Left: Present    Posterior tibialis:   Right:Present  Left: Present      IMPRESSION:  Ms. Cecile Bowen is a 72 y.o. female w/ an active medical problem list including Cervicogenic Migraine, neurogenic bladder with suprapubic catheter, MDD, On chronic opioid, HLD, T2DM on insulin, Afib, CKD4, Hypthyroidism, MO, VIJAYA presents for PE  Annual physical exam  -     Comprehensive Metabolic Panel; Future; Expected date: 01/30/2023    Need for pneumococcal vaccine  -     (In Office Administered) Pneumococcal Polysaccharide Vaccine (23 Valent) (SQ/IM)    Colon cancer screening  -     Ambulatory referral/consult to Endo Procedure ; Future; Expected date: 01/31/2023    Postmenopausal estrogen deficiency  -     DXA Bone Density Spine And Hip; Future; Expected date: 01/30/2023    CKD stage 4 due to type 2 diabetes mellitus  -     CBC Auto Differential; Future; Expected date: 01/30/2023  -     Comprehensive Metabolic Panel; Future;  Expected date: 01/30/2023    Type 2 diabetes mellitus with diabetic neuropathy, with long-term current use of insulin  - Last HbA1c:   Lab Results   Component Value Date    HGBA1C 5.5 01/19/2023   - Continue with current medical management   - Pt educated on the importance of healthy, carb-controlled diet and exercising at least 30 mins a day. Pt also told to self-foot examinations at home. Self-monitor blood glucose before different meals each day and bring to next clinic visit.   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, estrogen receptor positive  - Mammogram pending     Secondary hyperparathyroidism, renal  - CMP pending     Sideroblastic anemia  Lab Results   Component Value Date    IRON 45 02/09/2022    TIBC 227 02/09/2022    FERRITIN 470 (H) 02/09/2022     Lab Results   Component Value Date    FOLATE 4.6 02/09/2022     Lab Results   Component Value Date    LKDOXRMA80 590 01/23/2022     No results found for: RETICCTPCT    Likely secondary to anemia of chronic disease    - CBC pending    Uncomplicated opioid dependence  - F/u with PM&R     Major depressive disorder, recurrent episode, moderate  - Continue duloxetine   - well controlled     Debility   Aside from her co-morbidities, pt has significant debility that impedes her from performing ADLs independently. She solely depends on her sister, who is 69 yo with her own co-morbidities and debility, to help her with bathing, cooking her food and even changing her clothes.  - Will send referral for outpatient case management     Anastasiia Campbell D.O  Internal Medicine PGY-3  [facility]

## 2023-01-30 NOTE — PROGRESS NOTES
Outpatient Care Management  Plan of Care Follow Up Visit    Patient: Cecile Bowen  MRN: [id number]  Date of Service: 01/30/23  Completed by: Siena Botello RN  Referral Date: 06/24/2022    Reason for Visit   Patient presents with    OPCM Chart Review     1/25/23    OPCM RN First Follow-Up Attempt     1/25/23    Update Plan Of Care     1/30/23    Case Closure       Brief Summary: Pt's appt for oncology visit and mammogram have been made, notified pt of appts. Case Closed. Pt agreed to close the case.   Next Steps: Case Closed

## 2023-01-30 NOTE — PROGRESS NOTES
I have interviewed and examined the patient w/ the resident,   I agree w/ the impression and plan as outlined above.      Lurdes Cancino MD- Staff

## 2023-01-31 ENCOUNTER — LAB VISIT (OUTPATIENT)
Dept: LAB | Facility: HOSPITAL | Age: 73
End: 2023-01-31
Payer: MEDICARE

## 2023-01-31 ENCOUNTER — PES CALL (OUTPATIENT)
Dept: ADMINISTRATIVE | Facility: CLINIC | Age: 73
End: 2023-01-31
Payer: MEDICARE

## 2023-01-31 ENCOUNTER — OFFICE VISIT (OUTPATIENT)
Dept: INTERNAL MEDICINE | Facility: CLINIC | Age: 73
End: 2023-01-31
Payer: MEDICARE

## 2023-01-31 VITALS
BODY MASS INDEX: 34.97 KG/M2 | SYSTOLIC BLOOD PRESSURE: 138 MMHG | HEIGHT: 68 IN | OXYGEN SATURATION: 100 % | DIASTOLIC BLOOD PRESSURE: 68 MMHG | HEART RATE: 73 BPM

## 2023-01-31 DIAGNOSIS — Z79.4 TYPE 2 DIABETES MELLITUS WITH HYPERGLYCEMIA, WITH LONG-TERM CURRENT USE OF INSULIN: ICD-10-CM

## 2023-01-31 DIAGNOSIS — E11.40 TYPE 2 DIABETES MELLITUS WITH DIABETIC NEUROPATHY, WITH LONG-TERM CURRENT USE OF INSULIN: ICD-10-CM

## 2023-01-31 DIAGNOSIS — E11.22 CKD STAGE 4 DUE TO TYPE 2 DIABETES MELLITUS: ICD-10-CM

## 2023-01-31 DIAGNOSIS — L81.4 AGE SPOTS: ICD-10-CM

## 2023-01-31 DIAGNOSIS — Z79.4 TYPE 2 DIABETES MELLITUS WITH DIABETIC NEUROPATHY, WITH LONG-TERM CURRENT USE OF INSULIN: Primary | ICD-10-CM

## 2023-01-31 DIAGNOSIS — Z17.0 MALIGNANT NEOPLASM OF AXILLARY TAIL OF LEFT BREAST IN FEMALE, ESTROGEN RECEPTOR POSITIVE: Chronic | ICD-10-CM

## 2023-01-31 DIAGNOSIS — N11.1 CHRONIC OBSTRUCTIVE PYELONEPHRITIS: ICD-10-CM

## 2023-01-31 DIAGNOSIS — E11.69 DIABETES MELLITUS TYPE 2 IN OBESE: ICD-10-CM

## 2023-01-31 DIAGNOSIS — C50.612 MALIGNANT NEOPLASM OF AXILLARY TAIL OF LEFT BREAST IN FEMALE, ESTROGEN RECEPTOR POSITIVE: Chronic | ICD-10-CM

## 2023-01-31 DIAGNOSIS — E11.40 TYPE 2 DIABETES MELLITUS WITH DIABETIC NEUROPATHY, WITH LONG-TERM CURRENT USE OF INSULIN: Primary | ICD-10-CM

## 2023-01-31 DIAGNOSIS — Z12.31 ENCOUNTER FOR SCREENING MAMMOGRAM FOR MALIGNANT NEOPLASM OF BREAST: ICD-10-CM

## 2023-01-31 DIAGNOSIS — E03.9 HYPOTHYROIDISM, UNSPECIFIED TYPE: ICD-10-CM

## 2023-01-31 DIAGNOSIS — Z79.4 TYPE 2 DIABETES MELLITUS WITH DIABETIC NEUROPATHY, WITH LONG-TERM CURRENT USE OF INSULIN: ICD-10-CM

## 2023-01-31 DIAGNOSIS — N31.9 NEUROGENIC BLADDER: Chronic | ICD-10-CM

## 2023-01-31 DIAGNOSIS — E11.9 TYPE 2 DIABETES MELLITUS NOT AT GOAL: ICD-10-CM

## 2023-01-31 DIAGNOSIS — E11.65 TYPE 2 DIABETES MELLITUS WITH HYPERGLYCEMIA, WITH LONG-TERM CURRENT USE OF INSULIN: ICD-10-CM

## 2023-01-31 DIAGNOSIS — L81.9 DISCOLORED SKIN: ICD-10-CM

## 2023-01-31 DIAGNOSIS — N18.4 CKD STAGE 4 DUE TO TYPE 2 DIABETES MELLITUS: ICD-10-CM

## 2023-01-31 DIAGNOSIS — N32.89 BLADDER SPASMS: ICD-10-CM

## 2023-01-31 DIAGNOSIS — Z00.00 ANNUAL PHYSICAL EXAM: ICD-10-CM

## 2023-01-31 DIAGNOSIS — M79.7 FIBROMYALGIA: Chronic | ICD-10-CM

## 2023-01-31 DIAGNOSIS — E66.9 DIABETES MELLITUS TYPE 2 IN OBESE: ICD-10-CM

## 2023-01-31 DIAGNOSIS — E03.9 ACQUIRED HYPOTHYROIDISM: Chronic | ICD-10-CM

## 2023-01-31 DIAGNOSIS — E11.22 STAGE 4 CHRONIC KIDNEY DISEASE DUE TO TYPE 2 DIABETES MELLITUS: Chronic | ICD-10-CM

## 2023-01-31 DIAGNOSIS — N18.4 STAGE 4 CHRONIC KIDNEY DISEASE DUE TO TYPE 2 DIABETES MELLITUS: Chronic | ICD-10-CM

## 2023-01-31 LAB
ALBUMIN SERPL BCP-MCNC: 3 G/DL (ref 3.5–5.2)
ALP SERPL-CCNC: 94 U/L (ref 55–135)
ALT SERPL W/O P-5'-P-CCNC: 9 U/L (ref 10–44)
ANION GAP SERPL CALC-SCNC: 12 MMOL/L (ref 8–16)
AST SERPL-CCNC: 11 U/L (ref 10–40)
BILIRUB SERPL-MCNC: 0.2 MG/DL (ref 0.1–1)
BUN SERPL-MCNC: 45 MG/DL (ref 8–23)
CALCIUM SERPL-MCNC: 9 MG/DL (ref 8.7–10.5)
CHLORIDE SERPL-SCNC: 108 MMOL/L (ref 95–110)
CHOLEST SERPL-MCNC: 190 MG/DL (ref 120–199)
CHOLEST/HDLC SERPL: 5.1 {RATIO} (ref 2–5)
CO2 SERPL-SCNC: 12 MMOL/L (ref 23–29)
CREAT SERPL-MCNC: 2.8 MG/DL (ref 0.5–1.4)
EST. GFR  (NO RACE VARIABLE): 17.6 ML/MIN/1.73 M^2
GLUCOSE SERPL-MCNC: 123 MG/DL (ref 70–110)
HDLC SERPL-MCNC: 37 MG/DL (ref 40–75)
HDLC SERPL: 19.5 % (ref 20–50)
LDLC SERPL CALC-MCNC: 127.4 MG/DL (ref 63–159)
NONHDLC SERPL-MCNC: 153 MG/DL
POTASSIUM SERPL-SCNC: 4.8 MMOL/L (ref 3.5–5.1)
PROT SERPL-MCNC: 6.4 G/DL (ref 6–8.4)
SODIUM SERPL-SCNC: 132 MMOL/L (ref 136–145)
TRIGL SERPL-MCNC: 128 MG/DL (ref 30–150)

## 2023-01-31 PROCEDURE — 3075F SYST BP GE 130 - 139MM HG: CPT | Mod: HCNC,CPTII,S$GLB, | Performed by: NURSE PRACTITIONER

## 2023-01-31 PROCEDURE — 99999 PR PBB SHADOW E&M-EST. PATIENT-LVL V: CPT | Mod: PBBFAC,HCNC,, | Performed by: NURSE PRACTITIONER

## 2023-01-31 PROCEDURE — 3072F PR LOW RISK FOR RETINOPATHY: ICD-10-PCS | Mod: HCNC,CPTII,S$GLB, | Performed by: NURSE PRACTITIONER

## 2023-01-31 PROCEDURE — 80061 LIPID PANEL: CPT | Mod: HCNC | Performed by: STUDENT IN AN ORGANIZED HEALTH CARE EDUCATION/TRAINING PROGRAM

## 2023-01-31 PROCEDURE — 85025 COMPLETE CBC W/AUTO DIFF WBC: CPT | Mod: HCNC | Performed by: STUDENT IN AN ORGANIZED HEALTH CARE EDUCATION/TRAINING PROGRAM

## 2023-01-31 PROCEDURE — 1160F PR REVIEW ALL MEDS BY PRESCRIBER/CLIN PHARMACIST DOCUMENTED: ICD-10-PCS | Mod: HCNC,CPTII,S$GLB, | Performed by: NURSE PRACTITIONER

## 2023-01-31 PROCEDURE — 3288F PR FALLS RISK ASSESSMENT DOCUMENTED: ICD-10-PCS | Mod: HCNC,CPTII,S$GLB, | Performed by: NURSE PRACTITIONER

## 2023-01-31 PROCEDURE — 3288F FALL RISK ASSESSMENT DOCD: CPT | Mod: HCNC,CPTII,S$GLB, | Performed by: NURSE PRACTITIONER

## 2023-01-31 PROCEDURE — 3008F PR BODY MASS INDEX (BMI) DOCUMENTED: ICD-10-PCS | Mod: HCNC,CPTII,S$GLB, | Performed by: NURSE PRACTITIONER

## 2023-01-31 PROCEDURE — 1125F PR PAIN SEVERITY QUANTIFIED, PAIN PRESENT: ICD-10-PCS | Mod: HCNC,CPTII,S$GLB, | Performed by: NURSE PRACTITIONER

## 2023-01-31 PROCEDURE — 3078F DIAST BP <80 MM HG: CPT | Mod: HCNC,CPTII,S$GLB, | Performed by: NURSE PRACTITIONER

## 2023-01-31 PROCEDURE — 3044F HG A1C LEVEL LT 7.0%: CPT | Mod: HCNC,CPTII,S$GLB, | Performed by: NURSE PRACTITIONER

## 2023-01-31 PROCEDURE — 36415 COLL VENOUS BLD VENIPUNCTURE: CPT | Mod: HCNC | Performed by: STUDENT IN AN ORGANIZED HEALTH CARE EDUCATION/TRAINING PROGRAM

## 2023-01-31 PROCEDURE — 1100F PTFALLS ASSESS-DOCD GE2>/YR: CPT | Mod: HCNC,CPTII,S$GLB, | Performed by: NURSE PRACTITIONER

## 2023-01-31 PROCEDURE — 1125F AMNT PAIN NOTED PAIN PRSNT: CPT | Mod: HCNC,CPTII,S$GLB, | Performed by: NURSE PRACTITIONER

## 2023-01-31 PROCEDURE — 1159F PR MEDICATION LIST DOCUMENTED IN MEDICAL RECORD: ICD-10-PCS | Mod: HCNC,CPTII,S$GLB, | Performed by: NURSE PRACTITIONER

## 2023-01-31 PROCEDURE — 84443 ASSAY THYROID STIM HORMONE: CPT | Mod: HCNC | Performed by: STUDENT IN AN ORGANIZED HEALTH CARE EDUCATION/TRAINING PROGRAM

## 2023-01-31 PROCEDURE — 3044F PR MOST RECENT HEMOGLOBIN A1C LEVEL <7.0%: ICD-10-PCS | Mod: HCNC,CPTII,S$GLB, | Performed by: NURSE PRACTITIONER

## 2023-01-31 PROCEDURE — 3078F PR MOST RECENT DIASTOLIC BLOOD PRESSURE < 80 MM HG: ICD-10-PCS | Mod: HCNC,CPTII,S$GLB, | Performed by: NURSE PRACTITIONER

## 2023-01-31 PROCEDURE — 1100F PR PT FALLS ASSESS DOC 2+ FALLS/FALL W/INJURY/YR: ICD-10-PCS | Mod: HCNC,CPTII,S$GLB, | Performed by: NURSE PRACTITIONER

## 2023-01-31 PROCEDURE — 80053 COMPREHEN METABOLIC PANEL: CPT | Mod: HCNC | Performed by: STUDENT IN AN ORGANIZED HEALTH CARE EDUCATION/TRAINING PROGRAM

## 2023-01-31 PROCEDURE — 99214 PR OFFICE/OUTPT VISIT, EST, LEVL IV, 30-39 MIN: ICD-10-PCS | Mod: HCNC,S$GLB,, | Performed by: NURSE PRACTITIONER

## 2023-01-31 PROCEDURE — 3072F LOW RISK FOR RETINOPATHY: CPT | Mod: HCNC,CPTII,S$GLB, | Performed by: NURSE PRACTITIONER

## 2023-01-31 PROCEDURE — 3008F BODY MASS INDEX DOCD: CPT | Mod: HCNC,CPTII,S$GLB, | Performed by: NURSE PRACTITIONER

## 2023-01-31 PROCEDURE — 3075F PR MOST RECENT SYSTOLIC BLOOD PRESS GE 130-139MM HG: ICD-10-PCS | Mod: HCNC,CPTII,S$GLB, | Performed by: NURSE PRACTITIONER

## 2023-01-31 PROCEDURE — 99999 PR PBB SHADOW E&M-EST. PATIENT-LVL V: ICD-10-PCS | Mod: PBBFAC,HCNC,, | Performed by: NURSE PRACTITIONER

## 2023-01-31 PROCEDURE — 1160F RVW MEDS BY RX/DR IN RCRD: CPT | Mod: HCNC,CPTII,S$GLB, | Performed by: NURSE PRACTITIONER

## 2023-01-31 PROCEDURE — 99214 OFFICE O/P EST MOD 30 MIN: CPT | Mod: HCNC,S$GLB,, | Performed by: NURSE PRACTITIONER

## 2023-01-31 PROCEDURE — 1159F MED LIST DOCD IN RCRD: CPT | Mod: HCNC,CPTII,S$GLB, | Performed by: NURSE PRACTITIONER

## 2023-01-31 RX ORDER — INSULIN GLARGINE 100 [IU]/ML
INJECTION, SOLUTION SUBCUTANEOUS
Qty: 15 EACH | Refills: 3
Start: 2023-01-31

## 2023-01-31 RX ORDER — PEN NEEDLE, DIABETIC 30 GX3/16"
NEEDLE, DISPOSABLE MISCELLANEOUS
Qty: 100 EACH | Refills: 3 | Status: SHIPPED | OUTPATIENT
Start: 2023-01-31

## 2023-01-31 RX ORDER — OXYBUTYNIN CHLORIDE 10 MG/1
10 TABLET, EXTENDED RELEASE ORAL DAILY
Qty: 100 TABLET | Refills: 3 | Status: SHIPPED | OUTPATIENT
Start: 2023-01-31 | End: 2023-12-01

## 2023-01-31 RX ORDER — GEMFIBROZIL 600 MG/1
600 TABLET, FILM COATED ORAL
Qty: 36 TABLET | Refills: 3
Start: 2023-02-01 | End: 2023-05-31

## 2023-01-31 RX ORDER — INSULIN LISPRO 100 [IU]/ML
INJECTION, SOLUTION INTRAVENOUS; SUBCUTANEOUS
Qty: 30 ML | Refills: 6
Start: 2023-01-31 | End: 2023-08-04 | Stop reason: ALTCHOICE

## 2023-01-31 NOTE — PROGRESS NOTES
CC: This 72 y.o. female presents for management of diabetes mellitus     HPI: She was diagnosed with T2DM in 2006, found on routine bloodwork screening given family hx of DM. Previously tried Actos but not effective.   Has h/o migraines, lumbar spondylosis, VIJAYA, obesity, depression, HTN, hydronephrosis, CKD 4, vit d deficiency, (L) breast ca, s/p mastectomy 8/1/19.  Last seen by CAROLE Dwyer DNP in 2017.  Last seen by me in 2019.   Since then audio visits  Being seen in person  Lives w/ sister and dog.   Has mobility issues, 2 falls in the past year.   No injury.  Has h/o recurrent hypoglycemia;.  Uses [employer] supplier for freestyle nadya    Of note, did not do well on invokana.     Since them, pt needs lab work, reports more hyperglycemic events.    Lives w/ brother (scoliosis, ca) and sister (has dm, herniated disc), gets meals on wheels.   a1c went from 7.9 to 6.7% to 5.5%   Uses nadya  Highest low 200s   Lowest  80s  Reports that her migraines have improved over the past 6 mos.  Has f/u with cardiology and nephrology, CKD 4.     Lab Results   Component Value Date    HGBA1C 5.5 01/19/2023     Hard to cook meals, ADLs at times   Social hx: ex gutierrez    DIET/ MEAL PATTERN: Eat 3 meals a day,   Breakfast- yogurts, fruit  Lunch- hot meals, smoothie from [facility]   Dinner- Ready to Eat from [employer] , lasagna  Bedtime snacks-limits, if so popcorn  Stopped meals on wheels    EXERCISE: no formal exercise, limited r/t back pain  7/10    CURRENT DM MEDS: Lantus Solostar 14-15 units daily, novolog self adjustment scale use   See media  Freestyle nadya  STANDARDS OF CARE:  Diabetes Management Status    Statin: Taking  ACE/ARB: Not taking    Screening or Prevention Patient's value Goal Complete/Controlled?   HgA1C Testing and Control   Lab Results   Component Value Date    HGBA1C 5.5 01/19/2023      Annually/Less than 8% Yes   Lipid profile : 02/09/2022 Annually Yes   LDL control Lab Results   Component Value Date    LDLCALC 94  02/09/2022    Annually/Less than 100 mg/dl  No   Nephropathy screening Lab Results   Component Value Date    LABMICR 851.0 05/19/2021     Lab Results   Component Value Date    PROTEINUA 1+ (A) 09/01/2022    Annually Yes   Blood pressure BP Readings from Last 1 Encounters:   01/30/23 132/80    Less than 140/90 Yes   Dilated retinal exam : 08/22/2022 Annually Yes   Foot exam   : 01/30/2023 Annually Yes     No history of retinopathy. Sees Dr Kearns   Foot exam: 10/2018     BMD 12/16- + osteopenia, due 12/2019, needs to reschedule                    ROS:   Gen: good appetite, generalized fatigue and weakness. Wt loss (up to 80# at one point)   Skin: Skin is intact, no rashes .  Eyes: + visual disturbances, +cataracts in both eyes, has 2 different glasses   Resp:   no SOB + WOLFE, no cough  Cardiac: No palpitations, chest pain, denies syncope, weakness, no edema or cyanosis.  GI: No nausea or vomiting, no constipation   /GYN: has suprapubic catheter placed.   PVD: c/o chronic back pain rates 7/10.  MS/Neuro: Denies numbness/ tingling in BLE; migraines every day-imitrex (generic-takes 1/2 tab #9 a month, excedrin migraine). In w/c   Psych: Denies drug/ETOH abuse, no hx. of eating disorders   Other systems: negative.    PE:  GENERAL: Well developed, well nourished.  PSYCH: AAOx3, appropriate mood and affect, pleasant expression, conversant, appears relaxed, well groomed.   EYES: Conjunctiva, corneas clear, no lid lag, EOM intact.  NECK: Supple, trachea midline  VASCULAR:  1+ (L) worse than (R)edema BLE, brisk capillary refill BUE.  SKIN:   Skin warm and dry.  no acanthosis nigracans. +venous stasis-worse on (L)-marking   Feet- footwear appropriate -diabetic shoes and socks      Hemoglobin A1C   Date Value Ref Range Status   01/19/2023 5.5 4.0 - 5.6 % Final     Comment:     ADA Screening Guidelines:  5.7-6.4%  Consistent with prediabetes  >or=6.5%  Consistent with diabetes    High levels of fetal hemoglobin interfere with  "the HbA1C  assay. Heterozygous hemoglobin variants (HbS, HgC, etc)do  not significantly interfere with this assay.   However, presence of multiple variants may affect accuracy.     07/14/2022 5.9 (H) 4.0 - 5.6 % Final     Comment:     ADA Screening Guidelines:  5.7-6.4%  Consistent with prediabetes  >or=6.5%  Consistent with diabetes    High levels of fetal hemoglobin interfere with the HbA1C  assay. Heterozygous hemoglobin variants (HbS, HgC, etc)do  not significantly interfere with this assay.   However, presence of multiple variants may affect accuracy.     01/03/2022 5.4 4.8 - 5.9 % Final     Lab Results   Component Value Date    TSH 2.015 11/29/2021       ASSESSMENT and PLAN:  1. Type 2 diabetes mellitus with diabetic neuropathy, with long-term current use of insulin  Hemoglobin A1C      2. Neurogenic bladder        3. 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 Acquired hypothyroidism  TSH      5. Fibromyalgia        6. Chronic obstructive pyelonephritis        7. Stage 4 chronic kidney disease due to type 2 diabetes mellitus        8. Encounter for screening mammogram for malignant neoplasm of breast  Mammo Digital Screening Bilat      9. Discolored skin  Ambulatory referral/consult to Dermatology      10. Age spots  Ambulatory referral/consult to Dermatology      11. Bladder spasms  oxybutynin (DITROPAN-XL) 10 MG 24 hr tablet      12. Type 2 diabetes mellitus not at goal  pen needle, diabetic (BD ULTRA-FINE SHORT PEN NEEDLE) 31 gauge x 5/16" Ndle      13. Type 2 diabetes mellitus with hyperglycemia, with long-term current use of insulin  insulin lispro 100 unit/mL injection        1-13. Follow up in 5 months w/ me  Continue nadya use   F/u with urology   F/u with breast center   F/u with nephrology  Mammogram 2023   Dermatology f/u   A1c goal less than 7.5%  Lab Results   Component Value Date    TSH 2.015 11/29/2021     At goal   Tsh next time   On lt4 "

## 2023-01-31 NOTE — PATIENT INSTRUCTIONS
Lab Results   Component Value Date    HGBA1C 5.5 01/19/2023     Goal less than 7%     [facility] medical supplier   Xiomara 1   Lantus 14-16 units at night   Novolog/humalog correction scale 180-230+2, etc.     [email or web address]  Eat fit tawana  [employer] tawana  [email or web address]     Goal  no higher than 180

## 2023-02-01 ENCOUNTER — TELEPHONE (OUTPATIENT)
Dept: INTERNAL MEDICINE | Facility: CLINIC | Age: 73
End: 2023-02-01
Payer: MEDICARE

## 2023-02-01 DIAGNOSIS — E78.5 HYPERLIPIDEMIA ASSOCIATED WITH TYPE 2 DIABETES MELLITUS: ICD-10-CM

## 2023-02-01 DIAGNOSIS — N18.4 CKD (CHRONIC KIDNEY DISEASE) STAGE 4, GFR 15-29 ML/MIN: Primary | Chronic | ICD-10-CM

## 2023-02-01 DIAGNOSIS — E11.69 HYPERLIPIDEMIA ASSOCIATED WITH TYPE 2 DIABETES MELLITUS: ICD-10-CM

## 2023-02-01 LAB
BASOPHILS # BLD AUTO: 0.05 K/UL (ref 0–0.2)
BASOPHILS NFR BLD: 0.5 % (ref 0–1.9)
DIFFERENTIAL METHOD: ABNORMAL
EOSINOPHIL # BLD AUTO: 0.4 K/UL (ref 0–0.5)
EOSINOPHIL NFR BLD: 3.8 % (ref 0–8)
ERYTHROCYTE [DISTWIDTH] IN BLOOD BY AUTOMATED COUNT: 15.3 % (ref 11.5–14.5)
HCT VFR BLD AUTO: 32.4 % (ref 37–48.5)
HGB BLD-MCNC: 9.3 G/DL (ref 12–16)
IMM GRANULOCYTES # BLD AUTO: 0.05 K/UL (ref 0–0.04)
IMM GRANULOCYTES NFR BLD AUTO: 0.5 % (ref 0–0.5)
LYMPHOCYTES # BLD AUTO: 2.1 K/UL (ref 1–4.8)
LYMPHOCYTES NFR BLD: 20.2 % (ref 18–48)
MCH RBC QN AUTO: 27.8 PG (ref 27–31)
MCHC RBC AUTO-ENTMCNC: 28.7 G/DL (ref 32–36)
MCV RBC AUTO: 97 FL (ref 82–98)
MONOCYTES # BLD AUTO: 0.8 K/UL (ref 0.3–1)
MONOCYTES NFR BLD: 7.8 % (ref 4–15)
NEUTROPHILS # BLD AUTO: 6.9 K/UL (ref 1.8–7.7)
NEUTROPHILS NFR BLD: 67.2 % (ref 38–73)
NRBC BLD-RTO: 0 /100 WBC
PLATELET # BLD AUTO: 342 K/UL (ref 150–450)
PMV BLD AUTO: 9.7 FL (ref 9.2–12.9)
RBC # BLD AUTO: 3.35 M/UL (ref 4–5.4)
TSH SERPL DL<=0.005 MIU/L-ACNC: 3.62 UIU/ML (ref 0.4–4)
WBC # BLD AUTO: 10.33 K/UL (ref 3.9–12.7)

## 2023-02-01 RX ORDER — SODIUM BICARBONATE 650 MG/1
650 TABLET ORAL 3 TIMES DAILY
Qty: 90 TABLET | Refills: 11 | Status: ON HOLD | OUTPATIENT
Start: 2023-02-01 | End: 2023-12-07 | Stop reason: HOSPADM

## 2023-02-01 RX ORDER — ATORVASTATIN CALCIUM 80 MG/1
80 TABLET, FILM COATED ORAL DAILY
Qty: 90 TABLET | Refills: 3 | Status: SHIPPED | OUTPATIENT
Start: 2023-02-01 | End: 2024-02-21

## 2023-02-01 NOTE — TELEPHONE ENCOUNTER
Called pt to notify of lab results. See below for significant lab results. Pt was given opportunity for ask questions or raise any concerns. All questions were answered. Pt agreed with plan.        CKD (chronic kidney disease) stage 4, GFR 15-29 ml/min  Have not seen by nephrology since March 2022. Recent CMP with Creatinine bump to 2.8 from baseline around 2. PT is still making good UOP. Bicarb low at 12. No other significant electrolytes abnormality. Will prescribe her sodium bicarb TID. And send an urgent referral to nephrology. Will notify  to set up an appointment as soon as possible.   -     sodium bicarbonate 650 MG tablet; Take 1 tablet (650 mg total) by mouth 3 (three) times daily.  Dispense: 90 tablet; Refill: 11    Hyperlipidemia associated with type 2 diabetes mellitus  -     atorvastatin (LIPITOR) 80 MG tablet; Take 1 tablet (80 mg total) by mouth once daily.  Dispense: 90 tablet; Refill: 3  Lipid profile on   Lab Results   Component Value Date    LDLCALC 127.4 01/31/2023    HDL 37 (L) 01/31/2023    TRIG 128 01/31/2023    CHOL 190 01/31/2023     The 10-year ASCVD risk score (Beny DK, et al., 2019) is: 21.2%    Values used to calculate the score:      Age: 72 years      Sex: Female      Is Non- : No      Diabetic: Yes      Tobacco smoker: No      Systolic Blood Pressure: 138 mmHg      Is BP treated: No      HDL Cholesterol: 37 mg/dL      Total Cholesterol: 190 mg/dL    - Pt was taking atorvastatin 80 mg QD but has since ran out of prescriptions. Will send new prescription into her pharmacy of choice     Anastasiia Campbell D.O  Internal Medicine PGY-3  [facility]

## 2023-02-07 ENCOUNTER — TELEPHONE (OUTPATIENT)
Dept: INTERNAL MEDICINE | Facility: CLINIC | Age: 73
End: 2023-02-07
Payer: MEDICARE

## 2023-02-07 DIAGNOSIS — Z00.00 ENCOUNTER FOR MEDICARE ANNUAL WELLNESS EXAM: ICD-10-CM

## 2023-02-07 DIAGNOSIS — N39.0 URINARY TRACT INFECTION ASSOCIATED WITH CYSTOSTOMY CATHETER, INITIAL ENCOUNTER: ICD-10-CM

## 2023-02-07 DIAGNOSIS — T83.510A URINARY TRACT INFECTION ASSOCIATED WITH CYSTOSTOMY CATHETER, INITIAL ENCOUNTER: ICD-10-CM

## 2023-02-07 RX ORDER — AMOXICILLIN 500 MG/1
500 CAPSULE ORAL 3 TIMES DAILY
Qty: 21 CAPSULE | Refills: 0 | Status: SHIPPED | OUTPATIENT
Start: 2023-02-07 | End: 2023-04-26

## 2023-02-07 NOTE — TELEPHONE ENCOUNTER
----- Message from Wing Portillo sent at 2/7/2023 11:07 AM CST -----  Contact: self [phone]  Pt requesting RX for cough/sinus stated need antibiotics.      [facility]/pharmacy #[id number] - [place], [place] - [address]  [address]  [facility] [address]  Phone: [phone] Fax: [phone]    Please call and advise

## 2023-02-07 NOTE — TELEPHONE ENCOUNTER
Spoke to pt. Pt stated that her symptoms started about 3 weeks ago. She stated that she took a home test about 2 weeks ago, and was negative for covid. Her current symptoms are a productive cough with thick, yellow phlegm; nasal congestion with thin, clear mucus; no fever, scratchy throat. Pt stated that she is only taking benadryl and cough drops.     Please advise.  Pharmacy queued.

## 2023-02-08 ENCOUNTER — DOCUMENT SCAN (OUTPATIENT)
Dept: HOME HEALTH SERVICES | Facility: HOSPITAL | Age: 73
End: 2023-02-08
Payer: MEDICARE

## 2023-02-09 ENCOUNTER — EXTERNAL HOME HEALTH (OUTPATIENT)
Dept: HOME HEALTH SERVICES | Facility: HOSPITAL | Age: 73
End: 2023-02-09
Payer: MEDICARE

## 2023-02-09 DIAGNOSIS — Z00.00 ENCOUNTER FOR MEDICARE ANNUAL WELLNESS EXAM: ICD-10-CM

## 2023-02-20 NOTE — ASSESSMENT & PLAN NOTE
Dialysis dependent ZAK  Nephrology consulted, appreciate recs  Suprapubic Cath declogged in ED, see UTI  Low albumin and history of nephrotic syndrome  - Strict I&Os  - Avoid nephrotoxic agents  - Renally adjust medications  - Prealbumin low  - P/C ratio elevated 9.33  -- History of nephrotic syndrome, follow up with Nephrology.   Patient is pleasant and talkative. He is requesting his toenails be trimmed, podiatry aware vis PS. No sob noted when up to bathroom. Pt states he wears oxygen at home and his equipment is old and he has been trying to get it replaced with the [place] and with [employer]. Pt has chronic pain in his feet and is aware of when his Tramadol is due. Call light in reach.

## 2023-02-23 ENCOUNTER — TELEPHONE (OUTPATIENT)
Dept: PHYSICAL MEDICINE AND REHAB | Facility: CLINIC | Age: 73
End: 2023-02-23
Payer: MEDICARE

## 2023-02-23 ENCOUNTER — OUTPATIENT CASE MANAGEMENT (OUTPATIENT)
Dept: ADMINISTRATIVE | Facility: OTHER | Age: 73
End: 2023-02-23
Payer: MEDICARE

## 2023-02-23 DIAGNOSIS — G89.29 CHRONIC MIDLINE LOW BACK PAIN WITHOUT SCIATICA: ICD-10-CM

## 2023-02-23 DIAGNOSIS — M54.50 CHRONIC MIDLINE LOW BACK PAIN WITHOUT SCIATICA: ICD-10-CM

## 2023-02-23 DIAGNOSIS — M54.2 CHRONIC NECK PAIN: ICD-10-CM

## 2023-02-23 DIAGNOSIS — G89.29 CHRONIC NECK PAIN: ICD-10-CM

## 2023-02-23 RX ORDER — HYDROCODONE BITARTRATE AND ACETAMINOPHEN 10; 325 MG/1; MG/1
1 TABLET ORAL EVERY 6 HOURS PRN
Qty: 120 TABLET | Refills: 0 | Status: SHIPPED | OUTPATIENT
Start: 2023-02-23 | End: 2023-03-27 | Stop reason: SDUPTHER

## 2023-02-24 ENCOUNTER — PES CALL (OUTPATIENT)
Dept: ADMINISTRATIVE | Facility: CLINIC | Age: 73
End: 2023-02-24
Payer: MEDICARE

## 2023-02-24 NOTE — TELEPHONE ENCOUNTER
----- Message from Anais Warren MA sent at 2/23/2023  4:53 PM CST -----  Regarding: FW: Diana Bell  MRN:  [id number]  ----- Message -----  From: Tremontana Chevalier  Sent: 2/23/2023   4:50 PM CST  To: [facility]BAR[facility] Staff  Subject: Electric Ray                                    pt calling to [facility] with Dr. Stafford's office to advise Dr. Stafford that pt has spoken with [employer] and they will cover the electric Ray but there needs to be a letter or statement of medical necessity as soon as possible and [facility] will approve. Pls tito pt @ [phone].    Also pt req refill on RX HYDROcodone-acetaminophen (NORCO)  mg per tablet     [facility]/PHARMACY #[id number] - [place], [place] - [address].

## 2023-02-24 NOTE — TELEPHONE ENCOUNTER
I called the patient.    She spoke to Chandana and was told that they may cover a power Ray lift with a justification letter by her physician.  I type the letter below and will have it faxed to Chandana.            To: [facility] [employer]    Patient:Cecile Bowen   : 1950   [employer] member ID number: [id number]    Re: Coverage for Ray lift    To whom it may concern;    Mrs. Bowen is a 72-year-old female with multiple medical problems including hypertension, diabetes mellitus, hypothyroidism, fibromyalgia, breast cancer status post left mastectomy , hydronephrosis with multiple medical complications, obesity, generalized osteoarthritis, debility.  She is followed up in the [facility] for chronic low back pain, chronic neck pain, fibromyalgia and carpal tunnel syndrome.  The patient has significant impairment of her activities of daily living and mobility.  She is dependent on transfers.  She requires a Ray lift for transfers.  Her caregiver is her sister.  She has chronic low back pain, hip pain and knee pain due to OA.  She is unable to operate safely a manual Ray lift to assist the patient with transfers.    Your assistance in covering a power/electric Ray lift is kindly requested.  If you have any questions, you may contact our office.  Sincerely,      Tesha Stafford MD  Physical Medicine & Rehabilitation  [address] [place] [id number]  Tel: [phone]  Fax: [phone]

## 2023-02-26 DIAGNOSIS — R11.2 NAUSEA AND VOMITING: ICD-10-CM

## 2023-02-27 RX ORDER — ONDANSETRON 8 MG/1
TABLET, ORALLY DISINTEGRATING ORAL
Qty: 30 TABLET | Refills: 2 | Status: SHIPPED | OUTPATIENT
Start: 2023-02-27 | End: 2023-08-17

## 2023-02-27 RX ORDER — TRAZODONE HYDROCHLORIDE 100 MG/1
TABLET ORAL
Qty: 90 TABLET | Refills: 2 | Status: SHIPPED | OUTPATIENT
Start: 2023-02-27 | End: 2023-10-27

## 2023-02-27 RX ORDER — LEVOTHYROXINE SODIUM 50 UG/1
TABLET ORAL
Qty: 90 TABLET | Refills: 2 | Status: SHIPPED | OUTPATIENT
Start: 2023-02-27 | End: 2024-01-04

## 2023-03-08 ENCOUNTER — OFFICE VISIT (OUTPATIENT)
Dept: HEMATOLOGY/ONCOLOGY | Facility: CLINIC | Age: 73
End: 2023-03-08
Payer: MEDICARE

## 2023-03-08 ENCOUNTER — HOSPITAL ENCOUNTER (OUTPATIENT)
Dept: RADIOLOGY | Facility: HOSPITAL | Age: 73
Discharge: HOME | End: 2023-03-08
Attending: INTERNAL MEDICINE
Payer: MEDICARE

## 2023-03-08 VITALS
OXYGEN SATURATION: 98 % | HEIGHT: 68 IN | HEART RATE: 99 BPM | TEMPERATURE: 97 F | SYSTOLIC BLOOD PRESSURE: 182 MMHG | BODY MASS INDEX: 39.53 KG/M2 | DIASTOLIC BLOOD PRESSURE: 85 MMHG | RESPIRATION RATE: 17 BRPM

## 2023-03-08 VITALS — BODY MASS INDEX: 39.53 KG/M2 | WEIGHT: 260 LBS

## 2023-03-08 DIAGNOSIS — C50.612 MALIGNANT NEOPLASM OF AXILLARY TAIL OF LEFT BREAST IN FEMALE, ESTROGEN RECEPTOR POSITIVE: Primary | Chronic | ICD-10-CM

## 2023-03-08 DIAGNOSIS — Z12.31 VISIT FOR SCREENING MAMMOGRAM: ICD-10-CM

## 2023-03-08 DIAGNOSIS — Z17.0 MALIGNANT NEOPLASM OF AXILLARY TAIL OF LEFT BREAST IN FEMALE, ESTROGEN RECEPTOR POSITIVE: Primary | Chronic | ICD-10-CM

## 2023-03-08 PROCEDURE — 77067 SCR MAMMO BI INCL CAD: CPT | Mod: TC,52,HCNC

## 2023-03-08 PROCEDURE — 1159F MED LIST DOCD IN RCRD: CPT | Mod: HCNC,CPTII,S$GLB, | Performed by: INTERNAL MEDICINE

## 2023-03-08 PROCEDURE — 3072F LOW RISK FOR RETINOPATHY: CPT | Mod: HCNC,CPTII,S$GLB, | Performed by: INTERNAL MEDICINE

## 2023-03-08 PROCEDURE — 3079F DIAST BP 80-89 MM HG: CPT | Mod: HCNC,CPTII,S$GLB, | Performed by: INTERNAL MEDICINE

## 2023-03-08 PROCEDURE — 77063 MAMMO DIGITAL SCREENING RIGHT WITH TOMO: ICD-10-PCS | Mod: 26,52,HCNC, | Performed by: RADIOLOGY

## 2023-03-08 PROCEDURE — 77067 SCR MAMMO BI INCL CAD: CPT | Mod: 26,52,HCNC, | Performed by: RADIOLOGY

## 2023-03-08 PROCEDURE — 3288F PR FALLS RISK ASSESSMENT DOCUMENTED: ICD-10-PCS | Mod: HCNC,CPTII,S$GLB, | Performed by: INTERNAL MEDICINE

## 2023-03-08 PROCEDURE — 1101F PR PT FALLS ASSESS DOC 0-1 FALLS W/OUT INJ PAST YR: ICD-10-PCS | Mod: HCNC,CPTII,S$GLB, | Performed by: INTERNAL MEDICINE

## 2023-03-08 PROCEDURE — 3008F BODY MASS INDEX DOCD: CPT | Mod: HCNC,CPTII,S$GLB, | Performed by: INTERNAL MEDICINE

## 2023-03-08 PROCEDURE — 99214 OFFICE O/P EST MOD 30 MIN: CPT | Mod: HCNC,S$GLB,, | Performed by: INTERNAL MEDICINE

## 2023-03-08 PROCEDURE — 99999 PR PBB SHADOW E&M-EST. PATIENT-LVL IV: ICD-10-PCS | Mod: PBBFAC,HCNC,, | Performed by: INTERNAL MEDICINE

## 2023-03-08 PROCEDURE — 3072F PR LOW RISK FOR RETINOPATHY: ICD-10-PCS | Mod: HCNC,CPTII,S$GLB, | Performed by: INTERNAL MEDICINE

## 2023-03-08 PROCEDURE — 1126F PR PAIN SEVERITY QUANTIFIED, NO PAIN PRESENT: ICD-10-PCS | Mod: HCNC,CPTII,S$GLB, | Performed by: INTERNAL MEDICINE

## 2023-03-08 PROCEDURE — 3288F FALL RISK ASSESSMENT DOCD: CPT | Mod: HCNC,CPTII,S$GLB, | Performed by: INTERNAL MEDICINE

## 2023-03-08 PROCEDURE — 1159F PR MEDICATION LIST DOCUMENTED IN MEDICAL RECORD: ICD-10-PCS | Mod: HCNC,CPTII,S$GLB, | Performed by: INTERNAL MEDICINE

## 2023-03-08 PROCEDURE — 3044F HG A1C LEVEL LT 7.0%: CPT | Mod: HCNC,CPTII,S$GLB, | Performed by: INTERNAL MEDICINE

## 2023-03-08 PROCEDURE — 3079F PR MOST RECENT DIASTOLIC BLOOD PRESSURE 80-89 MM HG: ICD-10-PCS | Mod: HCNC,CPTII,S$GLB, | Performed by: INTERNAL MEDICINE

## 2023-03-08 PROCEDURE — 77067 MAMMO DIGITAL SCREENING RIGHT WITH TOMO: ICD-10-PCS | Mod: 26,52,HCNC, | Performed by: RADIOLOGY

## 2023-03-08 PROCEDURE — 1126F AMNT PAIN NOTED NONE PRSNT: CPT | Mod: HCNC,CPTII,S$GLB, | Performed by: INTERNAL MEDICINE

## 2023-03-08 PROCEDURE — 1101F PT FALLS ASSESS-DOCD LE1/YR: CPT | Mod: HCNC,CPTII,S$GLB, | Performed by: INTERNAL MEDICINE

## 2023-03-08 PROCEDURE — 3044F PR MOST RECENT HEMOGLOBIN A1C LEVEL <7.0%: ICD-10-PCS | Mod: HCNC,CPTII,S$GLB, | Performed by: INTERNAL MEDICINE

## 2023-03-08 PROCEDURE — 99999 PR PBB SHADOW E&M-EST. PATIENT-LVL IV: CPT | Mod: PBBFAC,HCNC,, | Performed by: INTERNAL MEDICINE

## 2023-03-08 PROCEDURE — 3077F PR MOST RECENT SYSTOLIC BLOOD PRESSURE >= 140 MM HG: ICD-10-PCS | Mod: HCNC,CPTII,S$GLB, | Performed by: INTERNAL MEDICINE

## 2023-03-08 PROCEDURE — 99214 PR OFFICE/OUTPT VISIT, EST, LEVL IV, 30-39 MIN: ICD-10-PCS | Mod: HCNC,S$GLB,, | Performed by: INTERNAL MEDICINE

## 2023-03-08 PROCEDURE — 3077F SYST BP >= 140 MM HG: CPT | Mod: HCNC,CPTII,S$GLB, | Performed by: INTERNAL MEDICINE

## 2023-03-08 PROCEDURE — 3008F PR BODY MASS INDEX (BMI) DOCUMENTED: ICD-10-PCS | Mod: HCNC,CPTII,S$GLB, | Performed by: INTERNAL MEDICINE

## 2023-03-08 PROCEDURE — 77063 BREAST TOMOSYNTHESIS BI: CPT | Mod: 26,52,HCNC, | Performed by: RADIOLOGY

## 2023-03-08 RX ORDER — ANASTROZOLE 1 MG/1
1 TABLET ORAL DAILY
Qty: 90 TABLET | Refills: 2 | Status: SHIPPED | OUTPATIENT
Start: 2023-03-08 | End: 2024-01-22

## 2023-03-08 NOTE — PROGRESS NOTES
"Subjective:       Patient ID: Cecile Bowen is a 72 y.o. female.    Chief Complaint: No chief complaint on file.    HPI     Ms. Bowen returns today for follow up.  I had last seen her physically in January 2020 and we had a virtual visit in July 2020.  During the virtual visit I had recommended that she return to see me in 3 months, however, it is only today that she returns to the clinic.  She has been on anastrazole since October 1, 2019, and has tolerated it well so far.   Briefly, she is a 72-year-old female with multiple comorbidities who on 08/01/2019, underwent a left modified radical mastectomy with sentinel lymph node biopsy for a 1.2 cm grade 1 carcinoma that was ER strongly positive, UT positive and HER-2 negative.   Resection margins were clear and white.  Unfortunately, one of two sentinel lymph nodes had micrometastases.    Of note, a MammaPrint was low score predicting for 97.8% probability of disease free survival at five years with hormonal management.  She was deemed a poor candidate for chemotherapy and she was started on anastrazole in mid September 2019.     Her DXA scan has shown osteopenia of the L spine and the hip.   She had a mammogram earlier today the results of which are pending   When asked, she states that she discontinued the Arimidex approximately 6 months ago because she "wanted to start fresh" and did not feel that she was benefitting from Arimidex    Review of Systems    Overall she feels fair.  She has mobility issues and she is essentially wheelchair-bound.  ECOG PS is 3.  She mentions her fatigue and SOB with exertion.  She is using a wheelchair for ambulation due to her DJD.   She denies any anxiety, depression, easy bruising, fevers, chills, night  sweats, weight loss, nausea, vomiting, diarrhea, constipation, diplopia, blurred vision, chest pain, palpitations, breast pain, abdominal pain, extremity pain, or difficulty ambulating.  The remainder of the ten-point ROS, " including general, skin, lymph, H/N, cardiorespiratory, GI, , Neuro, Endocrine, and psychiatric is negative.   Objective:      Physical Exam    She is alert, oriented to time, place, person, pleasant, well      nourished, in no acute physical distress.   She was examined on her wheelchair as she is not able to climb on the examination table.                                  VITAL SIGNS:  Reviewed                                      HEENT:  Normal.  There are no nasal, oral, lip, gingival, auricular, lid,    or conjunctival lesions.  Mucosae are moist and pink, and there is no        thrush.  Pupils are equal, reactive to light and accommodation.              Extraocular muscle movements are intact.  She is edentulous.  There is no frontal or maxillary tenderness.                                     NECK:  Supple without JVD, adenopathy, or thyromegaly.                       LUNGS:  Clear to auscultation without wheezing, rales, or rhonchi.           CARDIOVASCULAR:  Reveals an S1, S2, no murmurs, no rubs, no gallops.         ABDOMEN:  Soft, morbidly obese, nontender, without organomegaly.  Bowel sounds are    present. She does have a permanent suprapubic cystostomy.                                                                    EXTREMITIES:  No cyanosis, clubbing, or edema.                               BREASTS:  She is status post left mastectomy with a well-healed incision and no evidence of a chest wall recurrence.  There are no masses in the right breast.                                 LYMPHATIC:  There is no cervical, axillary, or supraclavicular adenopathy.   SKIN:  Warm and moist, without petechiae, rashes, induration, or ecchymoses.           NEUROLOGIC:  DTRs are 0-1+ bilaterally, symmetrical, motor function is 5/5,  and cranial nerves are  within normal limits.    Assessment:       Problem List Items Addressed This Visit       Malignant neoplasm of left breast, estrogen receptor positive - Primary  (Chronic)         Plan:        I had a long discussion with her; assuming today's mammogram is okay, I recommended that she consider restarting the anastrozole, take it through the end of September 2024 and see me again in 6 months.  She appears to be agreeable and she asked me to send a prescription to her pharmacy, which I did.  I asked her to return in 4 months.  Approximately 30 minutes spent reviewing her chart, interviewing and examining the patient and coordinating her care     Route Chart for Scheduling    Med Onc Chart Routing      Follow up with physician 4 months.   Follow up with AUG[facility]O    Infusion scheduling note    Injection scheduling note    Labs    Imaging    Pharmacy appointment    Other referrals          Therapy Plan Information  Medications  ferric carboxymaltose (INJECTAFER) 750 mg in sodium chloride 0.9% 250 mL infusion  750 mg, Intravenous, 1 time a week, at least 7 days apart  IV Fluids  0.9%  NaCl infusion  Intravenous, 1 time a week, at least 7 days apart  Flushes  sodium chloride 0.9% flush 10 mL  10 mL, Intravenous, 1 time a week, at least 7 days apart  heparin, porcine (PF) 100 unit/mL injection flush 500 Units  500 Units, Intravenous, 1 time a week, at least 7 days apart  sodium chloride 0.9% 100 mL flush bag  Intravenous, Every visit

## 2023-03-14 ENCOUNTER — EXTERNAL HOME HEALTH (OUTPATIENT)
Dept: HOME HEALTH SERVICES | Facility: HOSPITAL | Age: 73
End: 2023-03-14
Payer: MEDICARE

## 2023-03-20 ENCOUNTER — TELEPHONE (OUTPATIENT)
Dept: INTERNAL MEDICINE | Facility: CLINIC | Age: 73
End: 2023-03-20
Payer: MEDICARE

## 2023-03-20 NOTE — TELEPHONE ENCOUNTER
----- Message from Lurdes Navarro MD sent at 3/19/2023 12:56 PM CDT -----  Please note that your mammogram was read as follows  Impression:  There is no mammographic evidence of malignancy.   shadia

## 2023-03-27 DIAGNOSIS — M54.2 CHRONIC NECK PAIN: ICD-10-CM

## 2023-03-27 DIAGNOSIS — M54.50 CHRONIC MIDLINE LOW BACK PAIN WITHOUT SCIATICA: ICD-10-CM

## 2023-03-27 DIAGNOSIS — G89.29 CHRONIC NECK PAIN: ICD-10-CM

## 2023-03-27 DIAGNOSIS — G89.29 CHRONIC MIDLINE LOW BACK PAIN WITHOUT SCIATICA: ICD-10-CM

## 2023-03-27 RX ORDER — HYDROCODONE BITARTRATE AND ACETAMINOPHEN 10; 325 MG/1; MG/1
1 TABLET ORAL EVERY 6 HOURS PRN
Qty: 120 TABLET | Refills: 0 | Status: SHIPPED | OUTPATIENT
Start: 2023-03-27 | End: 2023-04-25 | Stop reason: SDUPTHER

## 2023-03-27 NOTE — TELEPHONE ENCOUNTER
----- Message from Johanna Ken MA sent at 3/27/2023  1:45 PM CDT -----  Regarding: Refill request  Contact: [phone]  Refill request on         HYDROcodone-acetaminophen (NORCO)  mg per tablet      Patient is states thanks for the refill.        [facility] #[id number] - [place], [place] - [address]

## 2023-04-05 ENCOUNTER — TELEPHONE (OUTPATIENT)
Dept: INTERNAL MEDICINE | Facility: CLINIC | Age: 73
End: 2023-04-05
Payer: MEDICARE

## 2023-04-05 RX ORDER — BENZONATATE 100 MG/1
100 CAPSULE ORAL 3 TIMES DAILY PRN
Qty: 30 CAPSULE | Refills: 0 | Status: SHIPPED | OUTPATIENT
Start: 2023-04-05 | End: 2023-04-15

## 2023-04-05 NOTE — TELEPHONE ENCOUNTER
----- Message from Luciana Herron sent at 4/5/2023  3:44 PM CDT -----  Contact: Pt [phone]  Pt seen you previously for a sinus infection and she stated that it is getting worse.     Please call and advise

## 2023-04-05 NOTE — TELEPHONE ENCOUNTER
E scripted Tessalon Perles to be taken Q 8 hours p.r.n. cough  She needs to be using saline nasal spray followed by Flonase or Nasacort

## 2023-04-05 NOTE — TELEPHONE ENCOUNTER
Spoke to pt. Pt stated that she has been having a productive cough with yellow phlegm, no fever, hoarse, nasal congestion at times. Pt stated that the only thing she has taken is Benadryl, because she didn't know what she could take. Pt requesting a rx for the cough.  Pharmacy queued.

## 2023-04-06 NOTE — TELEPHONE ENCOUNTER
Spoke to pt. Pt advised of rx and OTC recommendations.  Pt verbalized understanding.  I advised that if this regimen does not help, she will need to be seen and evaluated.  Pt verbalized understanding.

## 2023-04-12 ENCOUNTER — PES CALL (OUTPATIENT)
Dept: ADMINISTRATIVE | Facility: CLINIC | Age: 73
End: 2023-04-12
Payer: MEDICARE

## 2023-04-13 DIAGNOSIS — Z93.59 CHRONIC SUPRAPUBIC CATHETER: ICD-10-CM

## 2023-04-13 DIAGNOSIS — N31.9 NEUROGENIC BLADDER: ICD-10-CM

## 2023-04-13 DIAGNOSIS — T83.090A MECHANICAL COMPLICATION OF SUPRAPUBIC CATHETER, INITIAL ENCOUNTER: Primary | ICD-10-CM

## 2023-04-13 DIAGNOSIS — Z74.09 IMMOBILITY: ICD-10-CM

## 2023-04-13 RX ORDER — CIPROFLOXACIN 500 MG/1
500 TABLET ORAL ONCE
Status: CANCELLED | OUTPATIENT
Start: 2023-04-13 | End: 2023-04-13

## 2023-04-13 RX ORDER — LIDOCAINE HYDROCHLORIDE 20 MG/ML
JELLY TOPICAL ONCE
Status: CANCELLED | OUTPATIENT
Start: 2023-04-13 | End: 2023-04-13

## 2023-04-13 NOTE — PROGRESS NOTES
Home health called  Silicone SPT working but is unable to be removed.  Balloon was deflated.   Bleeding from SPT noted;

## 2023-04-14 ENCOUNTER — HOSPITAL ENCOUNTER (EMERGENCY)
Facility: HOSPITAL | Age: 73
Discharge: HOME | End: 2023-04-15
Attending: EMERGENCY MEDICINE
Payer: MEDICARE

## 2023-04-14 ENCOUNTER — DOCUMENT SCAN (OUTPATIENT)
Dept: HOME HEALTH SERVICES | Facility: HOSPITAL | Age: 73
End: 2023-04-14
Payer: MEDICARE

## 2023-04-14 DIAGNOSIS — T83.010A SUPRAPUBIC CATHETER DYSFUNCTION, INITIAL ENCOUNTER: Primary | ICD-10-CM

## 2023-04-14 PROCEDURE — 99283 EMERGENCY DEPT VISIT LOW MDM: CPT | Mod: 25,HCNC

## 2023-04-14 PROCEDURE — 51702 PR INSERTION OF TEMPORARY INDWELLING BLADDER CATHETER, SIMPLE: ICD-10-PCS | Mod: ,,, | Performed by: EMERGENCY MEDICINE

## 2023-04-14 PROCEDURE — 51705 CHANGE OF BLADDER TUBE: CPT | Mod: HCNC

## 2023-04-14 PROCEDURE — 51702 INSERT TEMP BLADDER CATH: CPT | Mod: ,,, | Performed by: EMERGENCY MEDICINE

## 2023-04-14 PROCEDURE — 99284 EMERGENCY DEPT VISIT MOD MDM: CPT | Mod: HCNC,25,, | Performed by: EMERGENCY MEDICINE

## 2023-04-14 PROCEDURE — 99284 PR EMERGENCY DEPT VISIT,LEVEL IV: ICD-10-PCS | Mod: HCNC,25,, | Performed by: EMERGENCY MEDICINE

## 2023-04-15 VITALS
TEMPERATURE: 99 F | DIASTOLIC BLOOD PRESSURE: 81 MMHG | OXYGEN SATURATION: 95 % | RESPIRATION RATE: 18 BRPM | SYSTOLIC BLOOD PRESSURE: 146 MMHG | HEART RATE: 91 BPM

## 2023-04-15 NOTE — ED NOTES
Patient identifiers verified and correct for Cecile Bowen  LOC: The patient is awake, alert and aware of environment with an appropriate affect, the patient is oriented x 3 and speaking appropriately.   APPEARANCE: Patient appears comfortable and in no acute distress, patient is clean and well groomed.  SKIN: The skin is warm and dry, color consistent with ethnicity, patient has normal skin turgor and moist mucus membranes, skin intact, no breakdown or bruising noted except to right buttock and rash under right breast  MUSCULOSKELETAL: Patient moving all extremities spontaneously, no swelling noted.  RESPIRATORY: Airway is open and patent, respirations are spontaneous, patient has a normal effort and rate, no accessory muscle use noted, pt placed on continuous pulse ox with O2 sats noted at 98% on room air.  CARDIAC: Patient has a normal rate, edema noted to BLE, capillary refill < 3 seconds.   GASTRO: Soft and non tender to palpation, no distention noted, normoactive bowel sounds present in all four quadrants. Pt states bowel movements have been constipated.  : Pt denies any pain or frequency with urination. Leaking from suprapubic catheter site  NEURO: Pt opens eyes spontaneously, behavior appropriate to situation, follows commands, facial expression symmetrical, bilateral hand grasp equal and even, purposeful motor response noted, normal sensation in all extremities when touched with a finger.

## 2023-04-15 NOTE — ED TRIAGE NOTES
Pt brought in by [facility] from residence S/P suprapubic cath dislodgment around noon today. Pt reports having catheter for 6/7 years and this occurring before. Pt denies fever, chills, nvd, SOB, CP endorses HA and chronic back pain

## 2023-04-15 NOTE — ED NOTES
Assumed care of pt at this time. VSS, RR even and unlabored. Resting in bed comfortably. No voiced compaints of pain or discomfort at this time. Safety protocols remain. Verified lines/leads attatched to patient at this time.   Plan for transport home      General: Awake and alert:  Neck: Supple  Respiratory: Nonlabored respirations. No audible wheeze. Speaking in full sentences.  Cardiac: Well-perfused. No acrocyanosis.  Abdomen: Supple  Neurological: Moves all extremities symmetrically and equally. Answers questions appropriately.

## 2023-04-15 NOTE — ED PROVIDER NOTES
Encounter Date: 4/14/2023       History     Chief Complaint   Patient presents with    Suprapubic Catheter Dislodged     Pt brought in by [facility] from residence S/P suprapubic cath dislodgment around noon today     72-year-old female with a history of neurogenic bladder who has a chronic indwelling suprapubic catheter presents after her current catheter fell out.  She gets monthly changes.  Her home health nurse was unable to change this catheter in the last few days.  They could not get the balloon deflated.  Today the catheter fell out around noon.  It is now around 8:00 p.m..  She is had a catheter for around 6 years.  She is currently in leaking urine from the site and wearing a diaper.  Patient denies nausea, vomiting, diarrhea, fever, cough, shortness of breath, chest pain, abdominal pain, or dysuria.  The patients available PMH, PSH, Social History, medications, allergies, and triage vital signs were reviewed just prior to their medical evaluation.       Review of patient's allergies indicates:  No Known Allergies  Past Medical History:   Diagnosis Date    Cervicogenic migraine     Chronic pain     CKD (chronic kidney disease) stage 4, GFR 15-29 ml/min     [facility]    CKD (chronic kidney disease) stage 4, GFR 15-29 ml/min     Diabetes mellitus     Long term use of Insulin, Diabetic Neuropathy    Dialysis patient 1/21/2022    Fibromyalgia     Hydronephrosis     Hyperlipidemia     Hypertension 12/12/2012    Hypothyroidism 12/12/2012    ARON (iron deficiency anemia)     Insomnia     Levoscoliosis     Malignant neoplasm of upper-outer quadrant of left breast in female, estrogen receptor positive     Metabolic acidosis     Mobility impaired     Nuclear sclerosis - Both Eyes 3/24/2014    VIJAYA (obstructive sleep apnea)     Osteopenia     Pulmonary nodule     Recurrent UTI     Renal manifestation of secondary diabetes mellitus     Secondary hyperparathyroidism, renal     Urinary retention      Past Surgical History:    Procedure Laterality Date    BIOPSY OF URETER Left 2/18/2022    Procedure: BIOPSY, URETER;  Surgeon: Ronel Whittington MD;  [facility];  Service: Urology;  Laterality: Left;    BREAST BIOPSY Right     benign    CHOLECYSTECTOMY      COLONOSCOPY N/A 1/13/2017    Procedure: COLONOSCOPY;  Surgeon: Morris Wiseman MD;  Location: [facility] ENDO (4TH FLR);  Service: Endoscopy;  Laterality: N/A;  Renal pt Nephrology advised to avoid phosphate preps    CYSTOSCOPY N/A 10/8/2018    Procedure: CYSTOSCOPY;  Surgeon: Ronel Whittington MD;  Location: [facility] OR 1ST FLR;  Service: Urology;  Laterality: N/A;  45 min    CYSTOSCOPY N/A 3/25/2019    Procedure: CYSTOSCOPY;  Surgeon: Ronel Whittington MD;  Location: [facility] OR 1ST FLR;  Service: Urology;  Laterality: N/A;  45 min    CYSTOSCOPY N/A 8/26/2019    Procedure: CYSTOSCOPY;  Surgeon: Ronel Whittington MD;  [facility];  Service: Urology;  Laterality: N/A;  45 min    CYSTOSCOPY N/A 7/2/2021    Procedure: CYSTOSCOPY;  Surgeon: Ronel Whittington MD;  [facility];  Service: Urology;  Laterality: N/A;    CYSTOSCOPY  12/23/2021    Procedure: CYSTOSCOPY;  Surgeon: Ronel Whittington MD;  [facility];  Service: Urology;;    CYSTOSCOPY  2/18/2022    Procedure: CYSTOSCOPY;  Surgeon: Ronel Whittington MD;  [facility];  Service: Urology;;    CYSTOSCOPY W/ URETERAL STENT PLACEMENT Left 5/15/2021    Procedure: CYSTOSCOPY, WITH URETERAL STENT INSERTION;  Surgeon: Levy Sánchez Jr., MD;  [facility];  Service: Urology;  Laterality: Left;    CYSTOSCOPY WITH BIOPSY OF BLADDER N/A 1/27/2020    Procedure: CYSTOSCOPY, WITH BLADDER BIOPSY, WITH FULGURATION IF INDICATED;  Surgeon: Ronel Whittington MD;  [facility];  Service: Urology;  Laterality: N/A;    DILATION AND CURETTAGE OF UTERUS      GALLBLADDER SURGERY  2006    HYSTERECTOMY      INJECTION FOR SENTINEL NODE  IDENTIFICATION Left 8/1/2019    Procedure: INJECTION, FOR SENTINEL NODE IDENTIFICATION;  Surgeon: Samia Fulton MD;  [facility];  Service: General;  Laterality: Left;    INJECTION OF BOTULINUM TOXIN TYPE A N/A 10/8/2018    Procedure: INJECTION, BOTULINUM TOXIN, TYPE A 300 UNITS;  Surgeon: Ronel Whittington MD;  Location: [facility] OR [address] [place] [address];  Service: Urology;  Laterality: N/A;    INJECTION OF BOTULINUM TOXIN TYPE A N/A 3/25/2019    Procedure: INJECTION, BOTULINUM TOXIN, TYPE A 300 UNITS;  Surgeon: Ronel Whittington MD;  Location: [facility] OR [address] [place] [address];  Service: Urology;  Laterality: N/A;    INJECTION OF BOTULINUM TOXIN TYPE A N/A 8/26/2019    Procedure: INJECTION, BOTULINUM TOXIN, TYPE A 300 UNITS;  Surgeon: Ronel Whittington MD;  Location: [facility] OR [address] [place] [address];  Service: Urology;  Laterality: N/A;    MASTECTOMY Left 8/1/2019    Procedure: MASTECTOMY 23 hour stay;  Surgeon: Samia Fulton MD;  Location: [facility] OR [address] [place] [address];  Service: General;  Laterality: Left;    MEDIPORT REMOVAL Right 6/24/2022    Procedure: REMOVAL, CATHETER, CENTRAL VENOUS, TUNNELED, WITH PORT;  Surgeon: Isauro Anderson MD;  Location: [facility] CATH LAB;  Service: Radiology;  Laterality: Right;    OVARIAN CYST SURGERY  1985    REPLACEMENT OF STENT Left 12/23/2021    Procedure: REPLACEMENT, STENT;  Surgeon: Ronel Whittington MD;  Location: [facility] OR [address] [place] [address];  Service: Urology;  Laterality: Left;    REPLACEMENT OF STENT Left 2/18/2022    Procedure: REPLACEMENT, STENT;  Surgeon: Ronel Whittington MD;  Location: [facility] OR [address] [place] [address];  Service: Urology;  Laterality: Left;    RETROGRADE PYELOGRAPHY Left 2/18/2022    Procedure: PYELOGRAM, RETROGRADE;  Surgeon: Ronel Whittington MD;  Location: [facility] OR [address] [place] [address];  Service: Urology;  Laterality: Left;    SENTINEL LYMPH NODE BIOPSY Left 8/1/2019    Procedure: BIOPSY, LYMPH NODE, SENTINEL;  Surgeon: Samia Fulton MD;  Location: [facility] OR [address] [place] [address];  Service: General;  Laterality: Left;     spt placement      TONSILLECTOMY, ADENOIDECTOMY      TOTAL ABDOMINAL HYSTERECTOMY W/ BILATERAL SALPINGOOPHORECTOMY  1985    URETEROSCOPY Left 2/18/2022    Procedure: URETEROSCOPY;  Surgeon: Ronel Whittington MD;  Location: [facility] OR [address] [place] [address];  Service: Urology;  Laterality: Left;     Family History   Problem Relation Age of Onset    Diabetes Sister     Kidney disease Sister         CKD III    ALS Mother         d.    Cancer Maternal Grandmother         d. colon    Cancer Paternal Grandfather         d. lung    Scoliosis Brother         increased pain    Prostate cancer Brother         cured s/p surgery    Cancer Brother         rare cancer that got into the bones    Diabetes Maternal Aunt     Kidney disease Maternal Aunt     Diabetes Maternal Uncle     Amblyopia Neg Hx     Blindness Neg Hx     Cataracts Neg Hx     Glaucoma Neg Hx     Macular degeneration Neg Hx     Retinal detachment Neg Hx     Strabismus Neg Hx      Social History     Tobacco Use    Smoking status: Never    Smokeless tobacco: Never   Substance Use Topics    Alcohol use: No     Alcohol/week: 0.0 standard drinks    Drug use: No     Review of Systems   Constitutional:  Negative for fever.   Respiratory:  Negative for cough and shortness of breath.    Cardiovascular:  Negative for chest pain.   Gastrointestinal:  Negative for abdominal pain, diarrhea, nausea and vomiting.   Genitourinary:  Negative for dysuria.     Physical Exam     Initial Vitals [04/14/23 1930]   BP Pulse Resp Temp SpO2   (!) 186/84 80 18 98.7 °F (37.1 °C) 98 %      MAP       --         Physical Exam    Nursing note and vitals reviewed.  Constitutional: She appears well-developed and well-nourished. She is not diaphoretic. No distress.   HENT:   Head: Normocephalic and atraumatic.   Nose: Nose normal.   Eyes: Conjunctivae are normal. Right eye exhibits no discharge. Left eye exhibits no discharge.   Neck: Neck supple.   Normal range of motion.  Cardiovascular:  Normal rate, regular  rhythm and normal heart sounds.     Exam reveals no gallop and no friction rub.       No murmur heard.  Pulmonary/Chest: Breath sounds normal. No respiratory distress. She has no wheezes. She has no rhonchi. She has no rales.   Abdominal: Abdomen is soft. She exhibits no distension. There is no abdominal tenderness.   Superpubic cath site, mature There is no rebound and no guarding.   Musculoskeletal:         General: No tenderness or edema. Normal range of motion.      Cervical back: Normal range of motion and neck supple.     Neurological: She is alert and oriented to person, place, and time. She has normal strength. GCS score is 15. GCS eye subscore is 4. GCS verbal subscore is 5. GCS motor subscore is 6.   Skin: Skin is warm and dry. No rash noted. No erythema.   Psychiatric: She has a normal mood and affect. Her behavior is normal. Judgment and thought content normal.       ED Course   Urinary Catheter    Date/Time: 4/14/2023 9:00 PM  Location procedure was performed: [facility] EMERGENCY DEPARTMENT  Performed by: Devin Simmons MD  Authorized by: Braulio Harmon MD   Pre-operative diagnosis: superpubic cath replacement  Post-operative diagnosis: same  Consent Done: Yes  Consent: Verbal consent obtained. Written consent not obtained.  Risks and benefits: risks, benefits and alternatives were discussed  Consent given by: patient  Patient understanding: patient states understanding of the procedure being performed  Patient consent: the patient's understanding of the procedure matches consent given  Patient identity confirmed: verbally with patient  Indications: neurogenic bladder  Local anesthesia used: no    Anesthesia:  Local anesthesia used: no    Patient sedated: no  Preparation: Patient was prepped and draped in the usual sterile fashion.  Catheter insertion: indwelling  Catheter type: coude  Catheter size: 12 Fr  Complicated insertion: no  Altered anatomy: no  Bladder irrigation: no  Number of attempts:  3  Urine characteristics: clear  Complications: No  Specimens: No  Implants: No  Patient tolerance: Patient tolerated the procedure well with no immediate complications  Comments: Had 16F originally, could not pass 16F or 14F, got in 12F coude (no balloon), secured in place with tape, gauze, and leg dressing    I was present for all of procedure    Labs Reviewed - No data to display       Imaging Results    None          Medications - No data to display  Medical Decision Making:   History:   Old Medical Records: I decided to obtain old medical records.  Old Records Summarized: records from clinic visits.       <> Summary of Records: Uro phone call note  ED Management:  72-year-old female presents many hours after her suprapubic catheter fell out.  Vitals with hypertension.  Physical exam as above.  No indication for labs or imaging.  Replaced as above.  Given this catheter has no balloon she is likely to need another replacement next week.  She is scheduled to see Urology on Wednesday.  Patient was counseled as such. Patient will return to ED for worsening symptoms, inability to eat/drink, fever greater than 100.4, or any other concerns. Did bedside teaching with return precautions.  All questions answered.  The patient acknowledges understanding.  Gave verbal discharge instructions.                         Clinical Impression:   Final diagnoses:  [T83.010A] Suprapubic catheter dysfunction, initial encounter (Primary)        ED Disposition Condition    Discharge Stable          ED Prescriptions    None       Follow-up Information       Follow up With Specialties Details Why Contact Info Additional Information    [facility]   [address]  [facility] [address] [phone] Main Building, 4th Floor Please park in [facility] and take [facility] elevator    [facility] - Emergency Dept Emergency Medicine  Return to ED for worsening symptoms, inability to eat/drink, fever  greater than 100.4, or any other concerns. [address] [facility] [id number] [phone]              Braulio Harmon MD  04/15/23 1807       Braulio Harmon MD  04/28/23 1059

## 2023-04-20 ENCOUNTER — HOSPITAL ENCOUNTER (EMERGENCY)
Facility: HOSPITAL | Age: 73
Discharge: HOME | End: 2023-04-21
Attending: EMERGENCY MEDICINE
Payer: MEDICARE

## 2023-04-20 DIAGNOSIS — T83.010A SUPRAPUBIC CATHETER DYSFUNCTION, INITIAL ENCOUNTER: Primary | ICD-10-CM

## 2023-04-20 LAB
BACTERIA #/AREA URNS AUTO: ABNORMAL /HPF
BILIRUB UR QL STRIP: NEGATIVE
BUN SERPL-MCNC: 38 MG/DL (ref 6–30)
CHLORIDE SERPL-SCNC: 106 MMOL/L (ref 95–110)
CLARITY UR REFRACT.AUTO: ABNORMAL
COLOR UR AUTO: YELLOW
CREAT SERPL-MCNC: 3.2 MG/DL (ref 0.5–1.4)
GLUCOSE SERPL-MCNC: 131 MG/DL (ref 70–110)
GLUCOSE UR QL STRIP: ABNORMAL
HCT VFR BLD CALC: 31 %PCV (ref 36–54)
HGB UR QL STRIP: ABNORMAL
HYALINE CASTS UR QL AUTO: 0 /LPF
KETONES UR QL STRIP: NEGATIVE
LEUKOCYTE ESTERASE UR QL STRIP: ABNORMAL
MICROSCOPIC COMMENT: ABNORMAL
NITRITE UR QL STRIP: POSITIVE
PH UR STRIP: 6 [PH] (ref 5–8)
POC IONIZED CALCIUM: 1.25 MMOL/L (ref 1.06–1.42)
POC TCO2 (MEASURED): 20 MMOL/L (ref 23–29)
POTASSIUM BLD-SCNC: 4.3 MMOL/L (ref 3.5–5.1)
PROT UR QL STRIP: ABNORMAL
RBC #/AREA URNS AUTO: >100 /HPF (ref 0–4)
SAMPLE: ABNORMAL
SODIUM BLD-SCNC: 136 MMOL/L (ref 136–145)
SP GR UR STRIP: 1.01 (ref 1–1.03)
SQUAMOUS #/AREA URNS AUTO: 1 /HPF
URN SPEC COLLECT METH UR: ABNORMAL
WBC #/AREA URNS AUTO: >100 /HPF (ref 0–5)

## 2023-04-20 PROCEDURE — 99284 EMERGENCY DEPT VISIT MOD MDM: CPT | Mod: 25,HCNC

## 2023-04-20 PROCEDURE — 87186 SC STD MICRODIL/AGAR DIL: CPT | Mod: HCNC | Performed by: STUDENT IN AN ORGANIZED HEALTH CARE EDUCATION/TRAINING PROGRAM

## 2023-04-20 PROCEDURE — 51040 PR INCISE/DRAIN BLADDER: ICD-10-PCS | Mod: ,,, | Performed by: EMERGENCY MEDICINE

## 2023-04-20 PROCEDURE — 96360 HYDRATION IV INFUSION INIT: CPT | Mod: HCNC

## 2023-04-20 PROCEDURE — 81001 URINALYSIS AUTO W/SCOPE: CPT | Mod: HCNC | Performed by: STUDENT IN AN ORGANIZED HEALTH CARE EDUCATION/TRAINING PROGRAM

## 2023-04-20 PROCEDURE — 96361 HYDRATE IV INFUSION ADD-ON: CPT | Mod: HCNC

## 2023-04-20 PROCEDURE — 87086 URINE CULTURE/COLONY COUNT: CPT | Mod: HCNC | Performed by: STUDENT IN AN ORGANIZED HEALTH CARE EDUCATION/TRAINING PROGRAM

## 2023-04-20 PROCEDURE — 87077 CULTURE AEROBIC IDENTIFY: CPT | Mod: 59,HCNC | Performed by: STUDENT IN AN ORGANIZED HEALTH CARE EDUCATION/TRAINING PROGRAM

## 2023-04-20 PROCEDURE — 87088 URINE BACTERIA CULTURE: CPT | Mod: HCNC | Performed by: STUDENT IN AN ORGANIZED HEALTH CARE EDUCATION/TRAINING PROGRAM

## 2023-04-20 PROCEDURE — 51710 CHANGE OF BLADDER TUBE: CPT

## 2023-04-20 PROCEDURE — 63600175 PHARM REV CODE 636 W HCPCS: Mod: HCNC | Performed by: STUDENT IN AN ORGANIZED HEALTH CARE EDUCATION/TRAINING PROGRAM

## 2023-04-20 PROCEDURE — 99284 PR EMERGENCY DEPT VISIT,LEVEL IV: ICD-10-PCS | Mod: 25,,, | Performed by: EMERGENCY MEDICINE

## 2023-04-20 PROCEDURE — 99284 EMERGENCY DEPT VISIT MOD MDM: CPT | Mod: 25,,, | Performed by: EMERGENCY MEDICINE

## 2023-04-20 PROCEDURE — 80047 BASIC METABLC PNL IONIZED CA: CPT | Mod: HCNC

## 2023-04-20 PROCEDURE — 51705 CHANGE OF BLADDER TUBE: CPT | Mod: HCNC

## 2023-04-20 PROCEDURE — 51040 INCISE & DRAIN BLADDER: CPT | Mod: ,,, | Performed by: EMERGENCY MEDICINE

## 2023-04-20 RX ORDER — AMOXICILLIN AND CLAVULANATE POTASSIUM 875; 125 MG/1; MG/1
1 TABLET, FILM COATED ORAL
Status: DISCONTINUED | OUTPATIENT
Start: 2023-04-20 | End: 2023-04-20

## 2023-04-20 RX ORDER — SULFAMETHOXAZOLE AND TRIMETHOPRIM 800; 160 MG/1; MG/1
1 TABLET ORAL
Status: DISCONTINUED | OUTPATIENT
Start: 2023-04-20 | End: 2023-04-20

## 2023-04-20 RX ADMIN — SODIUM CHLORIDE, POTASSIUM CHLORIDE, SODIUM LACTATE AND CALCIUM CHLORIDE 500 ML: 600; 310; 30; 20 INJECTION, SOLUTION INTRAVENOUS at 10:04

## 2023-04-21 VITALS
OXYGEN SATURATION: 98 % | SYSTOLIC BLOOD PRESSURE: 125 MMHG | TEMPERATURE: 98 F | RESPIRATION RATE: 18 BRPM | HEART RATE: 70 BPM | DIASTOLIC BLOOD PRESSURE: 76 MMHG

## 2023-04-21 PROCEDURE — 96361 HYDRATE IV INFUSION ADD-ON: CPT | Mod: HCNC

## 2023-04-21 NOTE — ED NOTES
I-STAT Chem-8+ Results:   Value Reference Range   Sodium 136 136-145 mmol/L   Potassium  4.3 3.5-5.1 mmol/L   Chloride 106  mmol/L   Ionized Calcium 1.25 1.06-1.42 mmol/L   CO2 (measured) 20 23-29 mmol/L   Glucose 131  mg/dL   BUN 38 6-30 mg/dL   Creatinine 3.2 0.5-1.4 mg/dL   Hematocrit 31 36-54%

## 2023-04-21 NOTE — ED NOTES
Cecile Bowen, a 72 y.o. female presents to the ED w/ complaint of suprapubic cath came out and needs replacement. Aao x 3 resp even and unlabored. Char at this time    Triage note:  Chief Complaint   Patient presents with    PEG Tube Complications     PEG tube came out     Review of patient's allergies indicates:  No Known Allergies  Past Medical History:   Diagnosis Date    Cervicogenic migraine     Chronic pain     CKD (chronic kidney disease) stage 4, GFR 15-29 ml/min     [facility]    CKD (chronic kidney disease) stage 4, GFR 15-29 ml/min     Diabetes mellitus     Long term use of Insulin, Diabetic Neuropathy    Dialysis patient 1/21/2022    Fibromyalgia     Hydronephrosis     Hyperlipidemia     Hypertension 12/12/2012    Hypothyroidism 12/12/2012    ARON (iron deficiency anemia)     Insomnia     Levoscoliosis     Malignant neoplasm of upper-outer quadrant of left breast in female, estrogen receptor positive     Metabolic acidosis     Mobility impaired     Nuclear sclerosis - Both Eyes 3/24/2014    VIJAYA (obstructive sleep apnea)     Osteopenia     Pulmonary nodule     Recurrent UTI     Renal manifestation of secondary diabetes mellitus     Secondary hyperparathyroidism, renal     Urinary retention

## 2023-04-21 NOTE — DISCHARGE INSTRUCTIONS
Home Care Instructions:  - Medications: Continue taking your home medications as prescribed  - If you develop fevers, abdominal pain, redness around site or any other symptoms, please return to the Emergency Department or see your PCP as you may need antibiotics.   - It is important that you stay hydrated. Be sure to drink water.     Follow-Up Plan:  - Follow-up with: Urology on 4/26/23  - Additional testing and/or evaluation will be directed by your primary doctor    Return to the Emergency Department for symptoms including but not limited to: worsening symptoms, severe back pain, shortness of breath or chest pain, vomiting with inability to hold down fluids, blood in vomit or poop, fevers greater than 100.4°F, passing out/fainting/unconsciousness, or other concerning symptoms.

## 2023-04-21 NOTE — ED PROVIDER NOTES
Encounter Date: 4/20/2023       History     Chief Complaint   Patient presents with    PEG Tube Complications     PEG tube came out     72-year-old female with a past medical history of neurogenic bladder s/p chronic indwelling suprapubic catheter presenting to ED with complaint of dislodgement of suprapubic catheter approximately 1 hour prior to arrival.  Patient was seen here 1 week ago for similar complaint.  At that time, ED staff was unable to it a balloon Tiwari catheter.  Coude was placed and secured with plan to follow up this week with Urology.  Patient states she had a urology appointment set up for tomorrow but had to postpone it due to inability to obtain transport.  Her urology appointment is now set for 4/26.  She endorses chronic intermittent nausea with occasional emesis.  She denies fevers, chills, malodorous urine, purulent drainage from site, chest pain or shortness of breath.    Review of patient's allergies indicates:  No Known Allergies  Past Medical History:   Diagnosis Date    Cervicogenic migraine     Chronic pain     CKD (chronic kidney disease) stage 4, GFR 15-29 ml/min     [facility]    CKD (chronic kidney disease) stage 4, GFR 15-29 ml/min     Diabetes mellitus     Long term use of Insulin, Diabetic Neuropathy    Dialysis patient 1/21/2022    Fibromyalgia     Hydronephrosis     Hyperlipidemia     Hypertension 12/12/2012    Hypothyroidism 12/12/2012    ARON (iron deficiency anemia)     Insomnia     Levoscoliosis     Malignant neoplasm of upper-outer quadrant of left breast in female, estrogen receptor positive     Metabolic acidosis     Mobility impaired     Nuclear sclerosis - Both Eyes 3/24/2014    VIJAYA (obstructive sleep apnea)     Osteopenia     Pulmonary nodule     Recurrent UTI     Renal manifestation of secondary diabetes mellitus     Secondary hyperparathyroidism, renal     Urinary retention      Past Surgical History:   Procedure Laterality Date    BIOPSY OF URETER Left 2/18/2022     Procedure: BIOPSY, URETER;  Surgeon: Ronel Whittington MD;  Location: [facility] OR 1ST FLR;  Service: Urology;  Laterality: Left;    BREAST BIOPSY Right     benign    CHOLECYSTECTOMY      COLONOSCOPY N/A 1/13/2017    Procedure: COLONOSCOPY;  Surgeon: Morris Wiseman MD;  Location: [facility] ENDO (4TH FLR);  Service: Endoscopy;  Laterality: N/A;  Renal pt Nephrology advised to avoid phosphate preps    CYSTOSCOPY N/A 10/8/2018    Procedure: CYSTOSCOPY;  Surgeon: Ronel Whittington MD;  Location: [facility] OR 1ST FLR;  Service: Urology;  Laterality: N/A;  45 min    CYSTOSCOPY N/A 3/25/2019    Procedure: CYSTOSCOPY;  Surgeon: Ronel Whittington MD;  Location: [facility] OR 1ST FLR;  Service: Urology;  Laterality: N/A;  45 min    CYSTOSCOPY N/A 8/26/2019    Procedure: CYSTOSCOPY;  Surgeon: Ronel Whittington MD;  Location: [facility] OR 1ST FLR;  Service: Urology;  Laterality: N/A;  45 min    CYSTOSCOPY N/A 7/2/2021    Procedure: CYSTOSCOPY;  Surgeon: oRnel Whittington MD;  Location: [facility] OR 1ST FLR;  Service: Urology;  Laterality: N/A;    CYSTOSCOPY  12/23/2021    Procedure: CYSTOSCOPY;  Surgeon: Ronel Whittington MD;  [facility];  Service: Urology;;    CYSTOSCOPY  2/18/2022    Procedure: CYSTOSCOPY;  Surgeon: Ronel Whittington MD;  [facility];  Service: Urology;;    CYSTOSCOPY W/ URETERAL STENT PLACEMENT Left 5/15/2021    Procedure: CYSTOSCOPY, WITH URETERAL STENT INSERTION;  Surgeon: Levy Sánchez Jr., MD;  [facility];  Service: Urology;  Laterality: Left;    CYSTOSCOPY WITH BIOPSY OF BLADDER N/A 1/27/2020    Procedure: CYSTOSCOPY, WITH BLADDER BIOPSY, WITH FULGURATION IF INDICATED;  Surgeon: Ronel Whittington MD;  Location: [facility] OR [facility] FLR;  Service: Urology;  Laterality: N/A;    DILATION AND CURETTAGE OF UTERUS      GALLBLADDER SURGERY  2006    HYSTERECTOMY      INJECTION FOR SENTINEL NODE IDENTIFICATION Left 8/1/2019    Procedure: INJECTION, FOR  SENTINEL NODE IDENTIFICATION;  Surgeon: Samia Fulton MD;  [facility];  Service: General;  Laterality: Left;    INJECTION OF BOTULINUM TOXIN TYPE A N/A 10/8/2018    Procedure: INJECTION, BOTULINUM TOXIN, TYPE A 300 UNITS;  Surgeon: Ronel Whittington MD;  [facility];  Service: Urology;  Laterality: N/A;    INJECTION OF BOTULINUM TOXIN TYPE A N/A 3/25/2019    Procedure: INJECTION, BOTULINUM TOXIN, TYPE A 300 UNITS;  Surgeon: Ronel Whittington MD;  [facility];  Service: Urology;  Laterality: N/A;    INJECTION OF BOTULINUM TOXIN TYPE A N/A 8/26/2019    Procedure: INJECTION, BOTULINUM TOXIN, TYPE A 300 UNITS;  Surgeon: Ronel Whittington MD;  [facility];  Service: Urology;  Laterality: N/A;    MASTECTOMY Left 8/1/2019    Procedure: MASTECTOMY 23 hour stay;  Surgeon: Samia Fulton MD;  Location: [facility] OR [address] [place] [address];  Service: General;  Laterality: Left;    MEDIPORT REMOVAL Right 6/24/2022    Procedure: REMOVAL, CATHETER, CENTRAL VENOUS, TUNNELED, WITH PORT;  Surgeon: Isauro Anderson MD;  Location: [facility] [place] CATH LAB;  Service: Radiology;  Laterality: Right;    OVARIAN CYST SURGERY  1985    REPLACEMENT OF STENT Left 12/23/2021    Procedure: REPLACEMENT, STENT;  Surgeon: Ronel Whittington MD;  Location: [facility] OR [address] [place] [address];  Service: Urology;  Laterality: Left;    REPLACEMENT OF STENT Left 2/18/2022    Procedure: REPLACEMENT, STENT;  Surgeon: Ronel Whittington MD;  Location: [facility] OR [address] [place] [address];  Service: Urology;  Laterality: Left;    RETROGRADE PYELOGRAPHY Left 2/18/2022    Procedure: PYELOGRAM, RETROGRADE;  Surgeon: Ronel Whittington MD;  Location: [facility] OR [address] [place] [address];  Service: Urology;  Laterality: Left;    SENTINEL LYMPH NODE BIOPSY Left 8/1/2019    Procedure: BIOPSY, LYMPH NODE, SENTINEL;  Surgeon: Samia Fulton MD;  [facility];  Service: General;  Laterality: Left;    spt placement      TONSILLECTOMY, ADENOIDECTOMY      TOTAL  ABDOMINAL HYSTERECTOMY W/ BILATERAL SALPINGOOPHORECTOMY  1985    URETEROSCOPY Left 2/18/2022    Procedure: URETEROSCOPY;  Surgeon: Ronel Whittington MD;  Location: [facility] OR [address] [place] [address];  Service: Urology;  Laterality: Left;     Family History   Problem Relation Age of Onset    Diabetes Sister     Kidney disease Sister         CKD III    ALS Mother         d.    Cancer Maternal Grandmother         d. colon    Cancer Paternal Grandfather         d. lung    Scoliosis Brother         increased pain    Prostate cancer Brother         cured s/p surgery    Cancer Brother         rare cancer that got into the bones    Diabetes Maternal Aunt     Kidney disease Maternal Aunt     Diabetes Maternal Uncle     Amblyopia Neg Hx     Blindness Neg Hx     Cataracts Neg Hx     Glaucoma Neg Hx     Macular degeneration Neg Hx     Retinal detachment Neg Hx     Strabismus Neg Hx      Social History     Tobacco Use    Smoking status: Never    Smokeless tobacco: Never   Substance Use Topics    Alcohol use: No     Alcohol/week: 0.0 standard drinks    Drug use: No     Review of Systems   Constitutional:  Negative for chills and fever.   HENT:  Negative for congestion and rhinorrhea.    Eyes:  Negative for pain and visual disturbance.   Respiratory:  Negative for cough and shortness of breath.    Cardiovascular:  Negative for chest pain and palpitations.   Gastrointestinal:  Positive for nausea (chronic, intermittent). Negative for abdominal pain and vomiting.   Genitourinary:  Negative for difficulty urinating and dysuria.   Musculoskeletal:  Negative for gait problem and joint swelling.   Skin:  Negative for rash and wound.   Neurological:  Negative for numbness and headaches.     Physical Exam     Initial Vitals [04/20/23 1941]   BP Pulse Resp Temp SpO2   (!) 122/90 (!) 55 14 97.7 °F (36.5 °C) 95 %      MAP       --         Physical Exam    Nursing note and vitals reviewed.  Constitutional: She is not diaphoretic. No distress.   HENT:    Head: Normocephalic and atraumatic.   Mouth/Throat: Oropharynx is clear and moist.   Eyes: Conjunctivae and EOM are normal.   Neck: Neck supple.   Normal range of motion.  Cardiovascular:  Normal rate, regular rhythm and normal heart sounds.           Pulmonary/Chest: Breath sounds normal. She has no wheezes. She has no rhonchi. She has no rales.   Abdominal: Abdomen is soft. She exhibits no distension. There is no abdominal tenderness.   Suprapubic region with no surrounding erythema or fluctuance.  Clear yellow urine from site.   Musculoskeletal:         General: No edema. Normal range of motion.      Cervical back: Normal range of motion and neck supple.     Neurological: She is alert and oriented to person, place, and time.   Skin: Skin is warm and dry. Capillary refill takes less than 2 seconds.       ED Course   Suprapubic Tube    Date/Time: 4/20/2023 9:00 PM  Performed by: Sheridan Zhang MD  Authorized by: Angeles Merino MD   Consent: Verbal consent obtained.  Consent given by: patient  Patient understanding: patient states understanding of the procedure being performed  Patient identity confirmed: verbally with patient  Indications: bladder incontinence  Indications comment: chronic indwelling suprapubic  Local anesthesia used: no    Anesthesia:  Local anesthesia used: no    Sedation:  Patient sedated: no    Number of attempts: 2  Urine characteristics: yellow  Comments: Patient with chronic indwelling suprapubic cath that fell off. Initial attempt at Tiwari placement failed, coude catheter was used to dilate the tract followed by successful placement of 12 Fr Tiwari      Labs Reviewed   URINALYSIS, REFLEX TO URINE CULTURE - Abnormal; Notable for the following components:       Result Value    Appearance, UA Hazy (*)     Protein, UA 2+ (*)     Glucose, UA Trace (*)     Occult Blood UA 3+ (*)     Nitrite, UA Positive (*)     Leukocytes, UA 3+ (*)     All other components within normal limits     Narrative:     Specimen Source->Urine   URINALYSIS MICROSCOPIC - Abnormal; Notable for the following components:    RBC, UA >100 (*)     WBC, UA >100 (*)     Bacteria Many (*)     All other components within normal limits    Narrative:     Specimen Source->Urine   ISTAT PROCEDURE - Abnormal; Notable for the following components:    POC Glucose 131 (*)     POC BUN 38 (*)     POC Creatinine 3.2 (*)     POC TCO2 (MEASURED) 20 (*)     POC Hematocrit 31 (*)     All other components within normal limits   CULTURE, URINE   ISTAT CHEM8          Imaging Results    None          Medications   lactated ringers bolus 500 mL (500 mLs Intravenous New Bag 4/20/23 9075)     Medical Decision Making:   History:   Old Medical Records: I decided to obtain old medical records.  Differential Diagnosis:   Dislodged catheter  UTI  ZAK  Clinical Tests:   Lab Tests: Ordered and Reviewed  ED Management:  Patient is hemodynamically stable and comfortable appearing.  12 [place] suprapubic catheter with balloon replaced with good urine output.  UA demonstrates over 100 RBCs and WBCs.  Patient has no symptoms of infection and there is high suspicion of colonization.  At this time, we will continue to monitor and have patient follow up as outpatient.  I-STAT demonstrates creatinine of 3.2 which is very mildly elevated from baseline.  Last creatinine was 2.8.  Patient given 500 cc IV fluid bolus and instructed to follow up closely with her PCP.  Patient instructed to also keep follow-up appointment with Urology on 04/26.  She verbalized understanding and agreement with plan.  Patient discharged with strict return precautions.  All questions answered.          Attending Attestation:   Physician Attestation Statement for Resident:  As the supervising MD   Physician Attestation Statement: I have personally seen and examined this patient.   I agree with the above history.  -:   As the supervising MD I agree with the above PE.     As the supervising MD I  agree with the above treatment, course, plan, and disposition.   -: 73 yo F with chronic indwelling suprapubic catheter presents after catheter fell off.  No abd pain, +intermittent chronic n/v no change recently, no fever    UA with bacteria, WBC, + multiple similar UA, not symptomatic in the ED, likely colonization, no antibiotics started in the ED, pending U cs and to be compared to prior cultures  Cr slightly elevated from baseline, patient will received IVF  Patient to return if symptoms of UTI, back pain, etc  I was personally present during the entire procedure.                             Clinical Impression:   Final diagnoses:  [T83.010A] Suprapubic catheter dysfunction, initial encounter (Primary)        ED Disposition Condition    Discharge Stable          ED Prescriptions    None       Follow-up Information       Follow up With Specialties Details Why Contact Info    Lurdes Navarro MD Internal Medicine   [address] [facility] [address]  [phone]      [facility] - Emergency Dept Emergency Medicine  As needed, If symptoms worsen [address] [facility] [address] [phone]             Sheridan Zhang MD  Resident  04/20/23 3499       Angeles Merino MD  04/21/23 8222

## 2023-04-21 NOTE — PROVIDER PROGRESS NOTES - EMERGENCY DEPT.
Encounter Date: 4/20/2023    ED Physician Progress Notes            ED Resident HAND-OFF NOTE:  Cecile Bowen is a 72 y.o. female who presented to the ED on 4/20/2023, patient C/O problem with suprapubic catheter.. I assumed care of patient from off-going ED physician team patient pending fluid administration.      In brief, patient has chronic suprapubic catheter that fell out last week.  They were unable to place a Tiwari with a balloon so a coude catheter was placed and secured so that patient may follow-up with urology appointment.  Good a catheter fell out this evening.  Labs showing worsening renal function.  Patient being given 500 cc bolus of LR.  UA consistent with colonization of suprapubic catheter.    On my evaluation, Cecile Bowen appears well, hemodynamically stable and in NAD. Thus far, Cecile Bowen has received:  Medications   lactated ringers bolus 500 mL (500 mLs Intravenous New Bag 4/20/23 2255)       /82   Pulse 62   Temp 98 °F (36.7 °C) (Oral)   Resp 18   SpO2 96%         Disposition: I anticipate patient will be discharged  ______________________  Adia Estrella MD   Emergency Medicine Resident      UPDATE:   Patient completed 500 cc LR bolus.  Patient is hemodynamically stable and appropriate for discharge this time.  Discussed strict return precautions, patient voices understanding.      :  Suprapubic catheter dysfunction, initial encounter (Primary)

## 2023-04-25 DIAGNOSIS — G89.29 CHRONIC MIDLINE LOW BACK PAIN WITHOUT SCIATICA: ICD-10-CM

## 2023-04-25 DIAGNOSIS — M54.2 CHRONIC NECK PAIN: ICD-10-CM

## 2023-04-25 DIAGNOSIS — M79.7 FIBROMYALGIA: ICD-10-CM

## 2023-04-25 DIAGNOSIS — M54.50 CHRONIC MIDLINE LOW BACK PAIN WITHOUT SCIATICA: ICD-10-CM

## 2023-04-25 DIAGNOSIS — G89.29 CHRONIC NECK PAIN: ICD-10-CM

## 2023-04-25 RX ORDER — HYDROCODONE BITARTRATE AND ACETAMINOPHEN 10; 325 MG/1; MG/1
1 TABLET ORAL EVERY 6 HOURS PRN
Qty: 120 TABLET | Refills: 0 | Status: SHIPPED | OUTPATIENT
Start: 2023-04-27 | End: 2023-06-01 | Stop reason: SDUPTHER

## 2023-04-25 RX ORDER — METHOCARBAMOL 750 MG/1
TABLET, FILM COATED ORAL
Qty: 180 TABLET | Refills: 1 | Status: SHIPPED | OUTPATIENT
Start: 2023-04-25 | End: 2023-06-05

## 2023-04-25 NOTE — TELEPHONE ENCOUNTER
----- Message from Lamar Banda sent at 4/25/2023  9:47 AM CDT -----  Regarding: Pt Advice  Contact: [phone]  Rx Refill/Request    Is this a Refill or New Rx:  Refill     Rx Name and Strength:    # HYDROcodone-acetaminophen (NORCO)  mg per tablet    # methocarbamoL (ROBAXIN) 750 MG Tab 180 tablet         [facility]/pharmacy #[id number] - [place], [place] - [address]  [facility] [address]  [place] [address]  Phone: [phone] Fax: [phone]

## 2023-04-26 ENCOUNTER — PROCEDURE VISIT (OUTPATIENT)
Dept: UROLOGY | Facility: CLINIC | Age: 73
End: 2023-04-26
Payer: MEDICARE

## 2023-04-26 VITALS
HEIGHT: 68 IN | RESPIRATION RATE: 18 BRPM | HEART RATE: 76 BPM | BODY MASS INDEX: 39.4 KG/M2 | DIASTOLIC BLOOD PRESSURE: 92 MMHG | TEMPERATURE: 98 F | SYSTOLIC BLOOD PRESSURE: 213 MMHG | WEIGHT: 260 LBS

## 2023-04-26 DIAGNOSIS — Z93.59 CHRONIC SUPRAPUBIC CATHETER: ICD-10-CM

## 2023-04-26 DIAGNOSIS — N31.9 NEUROGENIC BLADDER: ICD-10-CM

## 2023-04-26 DIAGNOSIS — Z74.09 IMMOBILITY: ICD-10-CM

## 2023-04-26 DIAGNOSIS — T83.090A MECHANICAL COMPLICATION OF SUPRAPUBIC CATHETER, INITIAL ENCOUNTER: ICD-10-CM

## 2023-04-26 PROCEDURE — 99499 CYSTOSCOPY: ICD-10-PCS | Mod: HCNC,S$GLB,, | Performed by: UROLOGY

## 2023-04-26 PROCEDURE — 99499 UNLISTED E&M SERVICE: CPT | Mod: HCNC,S$GLB,, | Performed by: UROLOGY

## 2023-04-26 PROCEDURE — 87086 URINE CULTURE/COLONY COUNT: CPT | Mod: HCNC | Performed by: UROLOGY

## 2023-04-26 PROCEDURE — 51705 CHANGE OF BLADDER TUBE: CPT | Mod: HCNC,S$GLB,, | Performed by: UROLOGY

## 2023-04-26 PROCEDURE — 51705 PR CHANGE OF BLADDER TUBE,SIMPLE: ICD-10-PCS | Mod: HCNC,S$GLB,, | Performed by: UROLOGY

## 2023-04-26 RX ORDER — CIPROFLOXACIN 500 MG/1
500 TABLET ORAL ONCE
Status: COMPLETED | OUTPATIENT
Start: 2023-04-26 | End: 2023-04-26

## 2023-04-26 RX ORDER — LIDOCAINE HYDROCHLORIDE 20 MG/ML
JELLY TOPICAL ONCE
Status: COMPLETED | OUTPATIENT
Start: 2023-04-26 | End: 2023-04-26

## 2023-04-26 RX ORDER — AMOXICILLIN AND CLAVULANATE POTASSIUM 875; 125 MG/1; MG/1
1 TABLET, FILM COATED ORAL 2 TIMES DAILY
Qty: 14 TABLET | Refills: 0 | Status: SHIPPED | OUTPATIENT
Start: 2023-04-26 | End: 2023-05-31

## 2023-04-26 RX ADMIN — LIDOCAINE HYDROCHLORIDE: 20 JELLY TOPICAL at 09:04

## 2023-04-26 RX ADMIN — CIPROFLOXACIN 500 MG: 500 TABLET ORAL at 10:04

## 2023-04-26 NOTE — PATIENT INSTRUCTIONS
What to Expect After a Cystoscopy  For the next 24-48 hours, you may feel a mild burning when you urinate. This burning is normal and expected. Drink plenty of water to dilute the urine to help relieve the burning sensation. You may also see a small amount of blood in your urine after the procedure.    Unless you are already taking antibiotics, you may be given an antibiotic after the test to prevent infection.    Signs and Symptoms to Report  Call the [facility] at [phone] if you develop any of the following:  Fever of 101 degrees or higher  Chills or persistent bleeding  Inability to urinate

## 2023-04-26 NOTE — PROCEDURES
Cystoscopy    Date/Time: 4/26/2023 9:30 AM  Performed by: Ronel Whittington MD  Authorized by: Tesha Ken NP     Consent Done?:  Yes (Written)  Timeout: prior to procedure the correct patient, procedure, and site was verified    Prep: patient was prepped and draped in usual sterile fashion    Local anesthesia used?: Yes    Anesthesia:  Intraurethral instillation  Indications: overactive bladder    Position:  Supine  Anesthesia:  Intraurethral instillation  Preparation: Patient was prepped and draped in usual sterile fashion    Scope type:  Flexible cystoscope  Urethra normal: Yes    Bladder neck normal: Yes    Bladder normal: Yes     patient tolerated the procedure well with no immediate complications  Comments:      Spt exchanged under direct vision. 12 F removed and exchanged for 16F [place] without difficulty. No tumors noted.   Bladder irrigated with 300cc of sterile saline. Debris removed from bladder.     Procedure cystoscopy and exchange of SPT and irrigation of bladder

## 2023-04-26 NOTE — PROGRESS NOTES
Detail Level: Simple Subjective:       Patient ID: Cecile Bowen is a 66 y.o. female.    Chief Complaint: neurogenic bladder (s/p change)    HPI Comments: Cecile Bowen is a 66 y.o. Female with history of bilateral hydronephrosis, incomplete bladder emptying which is managed by SPT (16fr).  Her last SPT change was 03/08/2017    Today she voices no complaints.  Here today for new SPT.  Good urine flow through SPT.      Last visit with Dr. Whittington was 11/10/2015.      12/10/2015:  Procedure(s) Performed:   1. Cystoscopy with bladder botox injection  2. Silver nitrate to suprapubic catheter site  3 . Change suprapubic catheter tube            Past Medical History:    Diabetes type 2, uncontrolled                   12/12/2012    Obesity                                         12/12/2012    Hypertension                                    12/12/2012    DJD (degenerative joint disease)                12/12/2012    Hypothyroidism                                  12/12/2012    Nuclear sclerosis - Both Eyes                   3/24/2014     Migraine                                                      Past Surgical History:    TOTAL ABDOMINAL HYSTERECTOMY W/ BILATERAL SALP*  1985          GALLBLADDER SURGERY                              2006          TONSILLECTOMY, ADENOIDECTOMY                                   OVARIAN CYST SURGERY                             1985          HYSTERECTOMY                                                   DILATION AND CURETTAGE OF UTERUS                               Review of patient's family history indicates:    Diabetes                       Sister                    Kidney disease                 Sister                    ALS                            Mother                      Comment: d.    Cancer                         Maternal Grandmother        Comment: d. colon    Cancer                         Paternal Grandfather        Comment: d. lung    Diabetes                       Maternal Aunt              "Kidney disease                 Maternal Aunt             Diabetes                       Maternal Uncle            Amblyopia                      Neg Hx                    Blindness                      Neg Hx                    Cataracts                      Neg Hx                    Glaucoma                       Neg Hx                    Macular degeneration           Neg Hx                    Retinal detachment             Neg Hx                    Strabismus                     Neg Hx                      Social History    Marital Status:             Spouse Name:                       Years of Education:                 Number of children:               Occupational History    None on file    Social History Main Topics    Smoking Status: Never Smoker                      Smokeless Status: Never Used                        Alcohol Use: No              Drug Use: No              Sexual Activity: Not Currently           Birth Control/Protection: Abstinence    Other Topics            Concern    None on file    Social History Narrative    Single      Lives w/ sister  And brother     both are helping her during her post hosp recovery period        Allergies:  Review of patient's allergies indicates no known allergies.    Medications:  Current outpatient prescriptions:amitriptyline (ELAVIL) 10 MG tablet, TAKE 3 TABLETS BY MOUTH IN THE EVENING, Disp: 90 tablet, Rfl: 5;  aspirin (ECOTRIN) 81 MG EC tablet, Take 81 mg by mouth once daily., Disp: , Rfl: ;  BD INSULIN PEN NEEDLE UF SHORT 31 X 5/16 " Ndle, USE ONCE DAILY WITH LANTUS SOLOSTAR PEN INSULIN, Disp: 100 each, Rfl: 11  butalbital-acetaminophen-caffeine -40 mg (FIORICET, ESGIC) -40 mg per tablet, TAKE 1 TABLET EVERY 4 TO 6 HOURS, Disp: 30 tablet, Rfl: 0;  cyanocobalamin, vitamin B-12, (VITAMIN B-12) 2,500 mcg Subl, Place 2,500 mcg under the tongue once daily., Disp: , Rfl: ;  diphenhydrAMINE (BENADRYL) 25 mg capsule, Take 25 mg by mouth every 6 " "(six) hours as needed., Disp: , Rfl:   ferrous sulfate 325 (65 FE) MG EC tablet, Take 325 mg by mouth 3 (three) times daily with meals., Disp: , Rfl: ;  fluticasone (FLONASE) 50 mcg/actuation nasal spray, 1 SPRAY IN EACH NOSTRIL DAILY (Patient taking differently: 1 SPRAY IN EACH NOSTRIL DAILY PRN), Disp: 16 g, Rfl: 1;  furosemide (LASIX) 20 MG tablet, Take 1 tablet (20 mg total) by mouth once daily., Disp: 4 tablet, Rfl: 0  gemfibrozil (LOPID) 600 MG tablet, TAKE 1 TABLET BY MOUTH TWICE A DAY, Disp: 60 tablet, Rfl: 5;  (START ON 4/29/2015) hydrocodone-acetaminophen 10-325mg (NORCO)  mg Tab, Take 1 tablet by mouth every 6 (six) hours as needed., Disp: 120 tablet, Rfl: 0;  insulin lispro (HUMALOG) 100 unit/mL injection, Inject 7 Units into the skin 3 (three) times daily before meals., Disp: 6.3 mL, Rfl: 11  insulin syringe-needle U-100 (BD INSULIN SYRINGE ULTRA-FINE) 1/2 mL 31 x 15/64" Syrg, 1 Box by Misc.(Non-Drug; Combo Route) route 4 (four) times daily., Disp: 100 Syringe, Rfl: 12;  lancets (ONE TOUCH DELICA LANCETS) Misc, Ultra 2 lancets to check blood sugar BID, Disp: 100 each, Rfl: 6;  LANTUS SOLOSTAR 100 unit/mL (3 mL) InPn pen, , Disp: , Rfl: 3  LIDODERM 5 %(700 mg/patch), APPLY 1 PATCH TO SKIN DAILY (LEAVING ON FOR 12 HOURS AND OFF FOR 12 HOURS), Disp: 30 patch, Rfl: 6;  magnesium oxide (MAG-OX) 400 mg tablet, TAKE 1 TABLET (400 MG TOTAL) BY MOUTH 2 (TWO) TIMES DAILY., Disp: 60 tablet, Rfl: 11;  methocarbamol (ROBAXIN) 750 MG Tab, TAKE 1 TO 2 TABLETS BY MOUTH 3 TIMES A DAY (Patient taking differently: Take 2 tablets twice daily), Disp: 120 tablet, Rfl: 3  methocarbamol (ROBAXIN) 750 MG Tab, TAKE 1 TO 2 TABLETS BY MOUTH 3 TIMES A DAY, Disp: 120 tablet, Rfl: 3;  methylPREDNISolone (MEDROL DOSEPACK) 4 mg tablet, use as directed, Disp: 21 tablet, Rfl: 0;  multivitamin with minerals tablet, Take 1 tablet by mouth once daily., Disp: , Rfl: ;  pravastatin (PRAVACHOL) 40 MG tablet, TAKE 1 TABLET BY MOUTH EVERY " DAY, Disp: 30 tablet, Rfl: 2  pyridoxine (B-6) 100 MG Tab, Take 1 tablet (100 mg total) by mouth once daily., Disp: 30 tablet, Rfl: 12;  scopolamine (TRANSDERM-SCOP) 1.5 mg, Place 1 patch (1.5 mg total) onto the skin every 72 hours., Disp: 4 patch, Rfl: 0;  sulfamethoxazole-trimethoprim 800-160mg (BACTRIM DS) 800-160 mg Tab, Take 1 tablet by mouth 2 (two) times daily., Disp: , Rfl: 0  sumatriptan (IMITREX) 100 MG tablet, TAKE 1 TABLET (100 MG TOTAL) BY MOUTH ONCE., Disp: 9 tablet, Rfl: 6;  SYNTHROID 125 mcg tablet, TAKE 1 TABLET BY MOUTH DAILY, Disp: 90 tablet, Rfl: 4;  topiramate (TOPAMAX) 100 MG tablet, TAKE 1 TABLET (100 MG TOTAL) BY MOUTH 2 (TWO) TIMES DAILY., Disp: 60 tablet, Rfl: 11;  topiramate (TOPAMAX) 25 MG tablet, Take 4 tablets (100 mg total) by mouth 2 (two) times daily., Disp: 240 tablet, Rfl: 5  venlafaxine (EFFEXOR-XR) 150 MG Cp24, TAKE 1 CAPSULE BY MOUTH ONCE DAILY, Disp: 30 capsule, Rfl: 2;  vitamin D (VITAMIN D3) 185 MG Tab, Take 185 mg by mouth once daily., Disp: , Rfl:         Review of Systems   Constitutional: Negative.  Negative for activity change, appetite change, chills and fever.   HENT: Negative for congestion, facial swelling, mouth sores, rhinorrhea, sore throat and trouble swallowing.    Eyes: Negative for photophobia, discharge and visual disturbance.   Respiratory: Negative for apnea, cough, chest tightness and wheezing.    Cardiovascular: Negative for chest pain and palpitations.   Gastrointestinal: Negative for abdominal pain, anal bleeding, constipation, diarrhea, nausea and vomiting.   Genitourinary: Positive for difficulty urinating. Negative for decreased urine volume, dysuria, flank pain, frequency, hematuria, nocturia, pelvic pain and urgency.   Musculoskeletal: Positive for arthralgias, back pain and gait problem. Negative for neck pain and neck stiffness.   Skin: Negative.  Negative for rash.   Neurological: Negative for dizziness, seizures, speech difficulty, weakness and  headaches.   Psychiatric/Behavioral: Negative for agitation, confusion, self-injury, sleep disturbance and suicidal ideas. The patient is not nervous/anxious.        Objective:      Physical Exam   Nursing note and vitals reviewed.  Constitutional: She is oriented to person, place, and time. Vital signs are normal. She appears well-developed and well-nourished. She is active and cooperative.   HENT:   Head: Normocephalic.   Right Ear: External ear normal.   Left Ear: External ear normal.   Nose: Nose normal.   Eyes: Conjunctivae and lids are normal. Right eye exhibits no discharge. Left eye exhibits no discharge. No scleral icterus.   Neck: Trachea normal and normal range of motion. Neck supple. No tracheal deviation present.   Cardiovascular: Normal rate, regular rhythm and intact distal pulses.    Pulmonary/Chest: Effort normal. No respiratory distress.   Abdominal: Soft. Bowel sounds are normal. She exhibits no distension and no mass. There is no tenderness. There is no rebound, no guarding and no CVA tenderness.       Musculoskeletal: Normal range of motion. She exhibits no edema.   Neurological: She is alert and oriented to person, place, and time.   Skin: Skin is warm, dry and intact.     Psychiatric: She has a normal mood and affect. Her behavior is normal. Judgment and thought content normal.       Assessment:       1. Neurogenic bladder    2. Encounter for suprapubic catheter care        Plan:         I spent 20 minutes with the patient of which more than half was spent in direct consultation with the patient in regards to our treatment and plan.    Education and recommendations of today's plan of care including home remedies.  Old SPT easily removed; I placed a new 16fr SPT using sterile technique  I irrigated the bladder to verify the position;  Balloon inflated with 10cc sterile water.  Silver Nitrite sticks x 3 to granulating tissue.  Tolerated well;  RTC one month

## 2023-04-27 LAB
BACTERIA UR CULT: ABNORMAL
BACTERIA UR CULT: ABNORMAL
BACTERIA UR CULT: NORMAL
BACTERIA UR CULT: NORMAL

## 2023-05-08 ENCOUNTER — CLINICAL SUPPORT (OUTPATIENT)
Dept: ENDOSCOPY | Facility: HOSPITAL | Age: 73
End: 2023-05-08
Payer: MEDICARE

## 2023-05-08 ENCOUNTER — TELEPHONE (OUTPATIENT)
Dept: ENDOSCOPY | Facility: HOSPITAL | Age: 73
End: 2023-05-08

## 2023-05-08 DIAGNOSIS — Z12.11 COLON CANCER SCREENING: ICD-10-CM

## 2023-05-08 NOTE — PLAN OF CARE
Contacted pt to schedule colonoscopy. Request for Eliquis hold sent to Dr JAUN Navarro. New PAT appt scheduled. Pt verbalized understanding.

## 2023-05-08 NOTE — TELEPHONE ENCOUNTER
Hi Dr JAUN Navarro,  Pt has order for a colonoscopy. Per her Medication records pt is on Eliquis. Can she hold Eliquis x 2 days per Endoscopy protocol? Please advise.  Thanks.   Deepthi VELASQUEZ.

## 2023-05-12 ENCOUNTER — EXTERNAL HOME HEALTH (OUTPATIENT)
Dept: HOME HEALTH SERVICES | Facility: HOSPITAL | Age: 73
End: 2023-05-12
Payer: MEDICARE

## 2023-05-13 NOTE — TELEPHONE ENCOUNTER
Message  Received: 5 days ago  MD Deepthi Leigh RN  Caller: Unspecified (5 days ago, 10:30 AM)  yes           Previous Messages    Patient Calls  (Newest Message First)   Lurdes Navarro MD  You 5 days ago       yes       You routed conversation to Lurdes Navarro MD; Ramon Lino Staff 5 days ago     You 5 days ago     PC  Hi Dr JAUN Navarro,  Pt has order for a colonoscopy. Per her Medication records pt is on Eliquis. Can she hold Eliquis x 2 days per Endoscopy protocol? Please advise.  Thanks.   Deepthi VELASQUEZ.

## 2023-05-23 RX ORDER — CLONIDINE HYDROCHLORIDE 0.1 MG/1
0.2 TABLET ORAL 3 TIMES DAILY
Qty: 180 TABLET | Refills: 0 | Status: SHIPPED | OUTPATIENT
Start: 2023-05-23 | End: 2023-06-14

## 2023-05-23 NOTE — TELEPHONE ENCOUNTER
----- Message from Qing Sena sent at 5/23/2023  4:03 PM CDT -----  Contact: [phone]  Pt called to request a RX cloNIDine tablet. She says she has not needed to take it in over a year. She uses it only when her blood pressure goes a little too high. Please Advise       [facility]/pharmacy #[id number] - [place] - [address]  [address]  [place] [address]  Phone: [phone] Fax: [phone]

## 2023-05-31 ENCOUNTER — OFFICE VISIT (OUTPATIENT)
Dept: HOME HEALTH SERVICES | Facility: CLINIC | Age: 73
End: 2023-05-31
Payer: MEDICARE

## 2023-05-31 VITALS
RESPIRATION RATE: 16 BRPM | BODY MASS INDEX: 39.4 KG/M2 | DIASTOLIC BLOOD PRESSURE: 80 MMHG | HEIGHT: 68 IN | SYSTOLIC BLOOD PRESSURE: 144 MMHG | TEMPERATURE: 97 F | WEIGHT: 260 LBS | HEART RATE: 69 BPM | OXYGEN SATURATION: 99 %

## 2023-05-31 DIAGNOSIS — E03.9 ACQUIRED HYPOTHYROIDISM: Chronic | ICD-10-CM

## 2023-05-31 DIAGNOSIS — L89.313 PRESSURE INJURY OF RIGHT BUTTOCK, STAGE 3: ICD-10-CM

## 2023-05-31 DIAGNOSIS — E11.42 DIABETIC POLYNEUROPATHY ASSOCIATED WITH TYPE 2 DIABETES MELLITUS: ICD-10-CM

## 2023-05-31 DIAGNOSIS — Z00.00 ENCOUNTER FOR MEDICARE ANNUAL WELLNESS EXAM: ICD-10-CM

## 2023-05-31 DIAGNOSIS — I70.0 AORTIC ATHEROSCLEROSIS: ICD-10-CM

## 2023-05-31 DIAGNOSIS — N18.4 CKD (CHRONIC KIDNEY DISEASE) STAGE 4, GFR 15-29 ML/MIN: Chronic | ICD-10-CM

## 2023-05-31 DIAGNOSIS — I48.91 ATRIAL FIBRILLATION, UNSPECIFIED TYPE: Chronic | ICD-10-CM

## 2023-05-31 DIAGNOSIS — R26.9 ABNORMALITY OF GAIT AND MOBILITY: ICD-10-CM

## 2023-05-31 DIAGNOSIS — N25.81 SECONDARY HYPERPARATHYROIDISM, RENAL: ICD-10-CM

## 2023-05-31 DIAGNOSIS — N18.4 TYPE 2 DIABETES MELLITUS WITH STAGE 4 CHRONIC KIDNEY DISEASE, WITH LONG-TERM CURRENT USE OF INSULIN: ICD-10-CM

## 2023-05-31 DIAGNOSIS — Z79.4 TYPE 2 DIABETES MELLITUS WITH STAGE 4 CHRONIC KIDNEY DISEASE, WITH LONG-TERM CURRENT USE OF INSULIN: ICD-10-CM

## 2023-05-31 DIAGNOSIS — E11.69 HYPERLIPIDEMIA ASSOCIATED WITH TYPE 2 DIABETES MELLITUS: Chronic | ICD-10-CM

## 2023-05-31 DIAGNOSIS — E66.01 MORBID OBESITY DUE TO EXCESS CALORIES: Chronic | ICD-10-CM

## 2023-05-31 DIAGNOSIS — E78.5 HYPERLIPIDEMIA ASSOCIATED WITH TYPE 2 DIABETES MELLITUS: Chronic | ICD-10-CM

## 2023-05-31 DIAGNOSIS — C50.612 MALIGNANT NEOPLASM OF AXILLARY TAIL OF LEFT BREAST IN FEMALE, ESTROGEN RECEPTOR POSITIVE: Chronic | ICD-10-CM

## 2023-05-31 DIAGNOSIS — Z00.00 ENCOUNTER FOR PREVENTIVE HEALTH EXAMINATION: ICD-10-CM

## 2023-05-31 DIAGNOSIS — E11.22 TYPE 2 DIABETES MELLITUS WITH STAGE 4 CHRONIC KIDNEY DISEASE, WITH LONG-TERM CURRENT USE OF INSULIN: ICD-10-CM

## 2023-05-31 DIAGNOSIS — G89.29 OTHER CHRONIC PAIN: Chronic | ICD-10-CM

## 2023-05-31 DIAGNOSIS — Z99.89 DEPENDENCE ON OTHER ENABLING MACHINES AND DEVICES: ICD-10-CM

## 2023-05-31 DIAGNOSIS — Z79.4 LONG TERM CURRENT USE OF INSULIN: ICD-10-CM

## 2023-05-31 DIAGNOSIS — I15.2 HYPERTENSION ASSOCIATED WITH DIABETES: ICD-10-CM

## 2023-05-31 DIAGNOSIS — F11.20 UNCOMPLICATED OPIOID DEPENDENCE: ICD-10-CM

## 2023-05-31 DIAGNOSIS — D64.3 SIDEROBLASTIC ANEMIA: ICD-10-CM

## 2023-05-31 DIAGNOSIS — Z93.59 SUPRAPUBIC CATHETER: Primary | Chronic | ICD-10-CM

## 2023-05-31 DIAGNOSIS — M85.80 OSTEOPENIA, UNSPECIFIED LOCATION: ICD-10-CM

## 2023-05-31 DIAGNOSIS — E11.59 HYPERTENSION ASSOCIATED WITH DIABETES: ICD-10-CM

## 2023-05-31 DIAGNOSIS — F33.1 MAJOR DEPRESSIVE DISORDER, RECURRENT EPISODE, MODERATE: Chronic | ICD-10-CM

## 2023-05-31 DIAGNOSIS — G47.33 OSA (OBSTRUCTIVE SLEEP APNEA): Chronic | ICD-10-CM

## 2023-05-31 DIAGNOSIS — Z17.0 MALIGNANT NEOPLASM OF AXILLARY TAIL OF LEFT BREAST IN FEMALE, ESTROGEN RECEPTOR POSITIVE: Chronic | ICD-10-CM

## 2023-05-31 PROCEDURE — 3008F BODY MASS INDEX DOCD: CPT | Mod: CPTII,S$GLB,,

## 2023-05-31 PROCEDURE — G0439 PR MEDICARE ANNUAL WELLNESS SUBSEQUENT VISIT: ICD-10-PCS | Mod: S$GLB,,,

## 2023-05-31 PROCEDURE — 1160F PR REVIEW ALL MEDS BY PRESCRIBER/CLIN PHARMACIST DOCUMENTED: ICD-10-PCS | Mod: CPTII,S$GLB,,

## 2023-05-31 PROCEDURE — 1101F PR PT FALLS ASSESS DOC 0-1 FALLS W/OUT INJ PAST YR: ICD-10-PCS | Mod: CPTII,S$GLB,,

## 2023-05-31 PROCEDURE — 1170F PR FUNCTIONAL STATUS ASSESSED: ICD-10-PCS | Mod: CPTII,S$GLB,,

## 2023-05-31 PROCEDURE — 1125F PR PAIN SEVERITY QUANTIFIED, PAIN PRESENT: ICD-10-PCS | Mod: CPTII,S$GLB,,

## 2023-05-31 PROCEDURE — 3044F HG A1C LEVEL LT 7.0%: CPT | Mod: CPTII,S$GLB,,

## 2023-05-31 PROCEDURE — 1159F MED LIST DOCD IN RCRD: CPT | Mod: CPTII,S$GLB,,

## 2023-05-31 PROCEDURE — 3288F PR FALLS RISK ASSESSMENT DOCUMENTED: ICD-10-PCS | Mod: CPTII,S$GLB,,

## 2023-05-31 PROCEDURE — 1101F PT FALLS ASSESS-DOCD LE1/YR: CPT | Mod: CPTII,S$GLB,,

## 2023-05-31 PROCEDURE — 3072F LOW RISK FOR RETINOPATHY: CPT | Mod: CPTII,S$GLB,,

## 2023-05-31 PROCEDURE — 1159F PR MEDICATION LIST DOCUMENTED IN MEDICAL RECORD: ICD-10-PCS | Mod: CPTII,S$GLB,,

## 2023-05-31 PROCEDURE — 1125F AMNT PAIN NOTED PAIN PRSNT: CPT | Mod: CPTII,S$GLB,,

## 2023-05-31 PROCEDURE — G9919 PR SCREENING AND POSITIVE: ICD-10-PCS | Mod: CPTII,S$GLB,,

## 2023-05-31 PROCEDURE — 3288F FALL RISK ASSESSMENT DOCD: CPT | Mod: CPTII,S$GLB,,

## 2023-05-31 PROCEDURE — 3079F DIAST BP 80-89 MM HG: CPT | Mod: CPTII,S$GLB,,

## 2023-05-31 PROCEDURE — 3077F SYST BP >= 140 MM HG: CPT | Mod: CPTII,S$GLB,,

## 2023-05-31 PROCEDURE — 1160F RVW MEDS BY RX/DR IN RCRD: CPT | Mod: CPTII,S$GLB,,

## 2023-05-31 PROCEDURE — 1170F FXNL STATUS ASSESSED: CPT | Mod: CPTII,S$GLB,,

## 2023-05-31 PROCEDURE — G0439 PPPS, SUBSEQ VISIT: HCPCS | Mod: S$GLB,,,

## 2023-05-31 PROCEDURE — 3079F PR MOST RECENT DIASTOLIC BLOOD PRESSURE 80-89 MM HG: ICD-10-PCS | Mod: CPTII,S$GLB,,

## 2023-05-31 PROCEDURE — G9919 SCRN ND POS ND PROV OF REC: HCPCS | Mod: CPTII,S$GLB,,

## 2023-05-31 PROCEDURE — 3077F PR MOST RECENT SYSTOLIC BLOOD PRESSURE >= 140 MM HG: ICD-10-PCS | Mod: CPTII,S$GLB,,

## 2023-05-31 PROCEDURE — 3008F PR BODY MASS INDEX (BMI) DOCUMENTED: ICD-10-PCS | Mod: CPTII,S$GLB,,

## 2023-05-31 PROCEDURE — 3044F PR MOST RECENT HEMOGLOBIN A1C LEVEL <7.0%: ICD-10-PCS | Mod: CPTII,S$GLB,,

## 2023-05-31 PROCEDURE — 3072F PR LOW RISK FOR RETINOPATHY: ICD-10-PCS | Mod: CPTII,S$GLB,,

## 2023-05-31 NOTE — PROGRESS NOTES
"Cecile Bowen presented for a  Medicare AWV and comprehensive Health Risk Assessment today. The following components were reviewed and updated:    Medical history  Family History  Social history  Allergies and Current Medications  Health Risk Assessment  Health Maintenance  Care Team     Patient screened moderate and/or high risk for one or more social determinants of health (SDOH). Patient connected to community resources through the ED Navigator.      ** See Completed Assessments for Annual Wellness Visit within the encounter summary.**         The following assessments were completed:  Living Situation  CAGE  Depression Screening  Timed Get Up and Go: Deferred, impaired mobility  Whisper Test  Cognitive Function Screening    Nutrition Screening  ADL Screening  PAQ Screening        Vitals:    05/31/23 1022   BP: (!) 144/80   BP Location: Right arm   Patient Position: Sitting   Pulse: 69   Resp: 16   Temp: 97 °F (36.1 °C)   SpO2: 99%   Weight: 117.9 kg (260 lb)   Height: 5' 8" (1.727 m)     Body mass index is 39.53 kg/m².    Physical Exam  Vitals reviewed.   Constitutional:       General: She is not in acute distress.     Appearance: Normal appearance.   Cardiovascular:      Rate and Rhythm: Normal rate and regular rhythm.      Pulses: Normal pulses.      Heart sounds: No murmur heard.  Pulmonary:      Effort: Pulmonary effort is normal. No respiratory distress.      Breath sounds: Normal breath sounds.   Abdominal:      General: Bowel sounds are normal.   Musculoskeletal:      Right lower leg: Edema present.      Left lower leg: Edema present.   Neurological:      General: No focal deficit present.      Mental Status: She is alert and oriented to person, place, and time.      Motor: Weakness present.      Gait: Gait abnormal (unable to walk).   Psychiatric:         Mood and Affect: Mood normal.         Behavior: Behavior normal.             Diagnoses and health risks identified today and associated " recommendations/orders:    1. Encounter for preventive health examination  Assessments completed.   recommendations reviewed.  F/u with PCP as instructed.     Patient on chronic opioids for back pain, followed by Dr. Stafford.   Risk factors reviewed for any potential opioid use disorder   Pain evaluated during visit.  Current treatment plan documented.  Will refer to specialist, as appropriate.    - Ambulatory referral/consult to [facility]; Future    2. Encounter for Medicare annual wellness exam  Referred by PCP.   - Ambulatory Referral/Consult to [facility] Annual Wellness Visit (eAWV)    3. Pressure injury of right buttock, stage 3  Chronic, stable on current regimen. Followed by PCP.     4. Suprapubic catheter  Chronic, stable on current regimen. Followed by Urology.     5. CKD (chronic kidney disease) stage 4, GFR 15-29 ml/min  Chronic, stable on current regimen. Followed by PCP.     6. Type 2 diabetes mellitus with stage 4 chronic kidney disease, with long-term current use of insulin  Chronic, stable on current Lantus regimen. Not taking Lispro. Followed by PCP.   - Ambulatory referral/consult to Podiatry; Future  Lab Results   Component Value Date    HGBA1C 5.5 01/19/2023     7. Aortic atherosclerosis  Chronic, stable on current statin regimen. Followed by PCP.     8. Atrial fibrillation, unspecified type  Chronic, stable on current Eliquis regimen. Followed by Cardiology.     9. Major depressive disorder, recurrent episode, moderate  Chronic, stable on current Cymbalta regimen. Followed by PCP.     10. Uncomplicated opioid dependence  Chronic, stable on current Norco regimen. Followed by Dr. Stafford.    11. Sideroblastic anemia  Chronic, stable on current regimen. Followed by PCP.     12. Morbid obesity due to excess calories  Chronic, stable on current regimen. Followed by PCP.     13. Secondary hyperparathyroidism, renal  Chronic, stable on current regimen. Followed by  PCP.     14. Diabetic polyneuropathy associated with type 2 diabetes mellitus  Chronic, stable on current regimen. Followed by PCP.     15. Long term current use of insulin  Chronic, stable on current regimen. Followed by PCP.     16. Hyperlipidemia associated with type 2 diabetes mellitus  Chronic, stable on current statin regimen. Followed by PCP.     17. Hypertension associated with diabetes  Chronic, stable on current Clonidine regimen. Followed by PCP.     18. Malignant neoplasm of axillary tail of left breast in female, estrogen receptor positive  Chronic, stable on current regimen. Followed by Hem/Onc.     19. Acquired hypothyroidism  Chronic, stable on current Synthroid regimen. Followed by PCP.     20. Osteopenia, unspecified location  Chronic, stable on current Vit D regimen. Followed by PCP.     21. VIJAYA (obstructive sleep apnea)  Chronic, stable on current regimen. Followed by PCP.     22. Other chronic pain  Chronic, stable on current Norco regimen. Followed by Dr. Stafford.     23. Abnormality of gait and mobility  Unable to stand or walk.     24. Dependence on other enabling machines and devices  Bed bound. Uses wheelchair for appts.       Provided Cecile with a 5-10 year written screening schedule and personal prevention plan. Recommendations were developed using the USPSTF age appropriate recommendations. Education, counseling, and referrals were provided as needed. After Visit Summary printed and given to patient which includes a list of additional screenings\tests needed.    Follow up in about 1 year (around 5/31/2024) for Medicare AWV.    Emmanuelle Paul NP    I offered to discuss advanced care planning, including how to pick a person who would make decisions for you if you were unable to make them for yourself, called a health care power of , and what kind of decisions you might make such as use of life sustaining treatments such as ventilators and tube feeding when faced with a life  limiting illness recorded on a living will that they will need to know. (How you want to be cared for as you near the end of your natural life)     X Patient is interested in learning more about how to make advanced directives.  I provided them paperwork and offered to discuss this with them.

## 2023-05-31 NOTE — Clinical Note
Medicare AWV completed. Patient requesting someone to help her bathe and wash her hair. States she ahs not washed her hair in a year and her scalp itches. Please discuss with patient.   --MAMADOU Handley

## 2023-05-31 NOTE — PATIENT INSTRUCTIONS
Counseling and Referral of Other Preventative  (Italic type indicates deductible and co-insurance are waived)    Patient Name: Cecile Bowen  Today's Date: 5/31/2023    Health Maintenance       Date Due Completion Date    TETANUS VACCINE Never done ---    Shingles Vaccine (1 of 2) 02/10/2014 12/16/2013    Colorectal Cancer Screening 01/13/2022 1/13/2017    COVID-19 Vaccine (4 - Moderna series) 02/18/2022 12/24/2021    DEXA Scan 10/29/2022 10/29/2019    Hemoglobin A1c 07/19/2023 1/19/2023    Eye Exam 08/22/2023 8/22/2022    Foot Exam 01/30/2024 1/30/2023    Override on 1/30/2023: Done    Override on 10/11/2019: Done    Override on 9/27/2017: Done    Override on 10/6/2016: Done    Override on 9/19/2016: Done    Lipid Panel 01/31/2024 1/31/2023    Mammogram 03/08/2024 3/8/2023        No orders of the defined types were placed in this encounter.    The following information is provided to all patients.  This information is to help you find resources for any of the problems found today that may be affecting your health:                Living healthy guide: www.[facility].[place].gov      Understanding Diabetes: www.diabetes.org      Eating healthy: www.cdc.gov/healthyweight      [facility] home safety checklist: www.cdc.gov/steadi/patient.html      Agency on Aging: www.goea.[place].[facility]      Alcoholics anonymous (AA): www.aa.org      Physical Activity: www.zenon.nih.gov/ze6wyca      Tobacco use: www.quitwithus[place].org

## 2023-06-01 DIAGNOSIS — G89.29 CHRONIC NECK PAIN: ICD-10-CM

## 2023-06-01 DIAGNOSIS — M54.2 CHRONIC NECK PAIN: ICD-10-CM

## 2023-06-01 DIAGNOSIS — M54.50 CHRONIC MIDLINE LOW BACK PAIN WITHOUT SCIATICA: ICD-10-CM

## 2023-06-01 DIAGNOSIS — G89.29 CHRONIC MIDLINE LOW BACK PAIN WITHOUT SCIATICA: ICD-10-CM

## 2023-06-01 RX ORDER — HYDROCODONE BITARTRATE AND ACETAMINOPHEN 10; 325 MG/1; MG/1
1 TABLET ORAL EVERY 6 HOURS PRN
Qty: 120 TABLET | Refills: 0 | Status: SHIPPED | OUTPATIENT
Start: 2023-06-01 | End: 2023-07-26 | Stop reason: SDUPTHER

## 2023-06-01 NOTE — TELEPHONE ENCOUNTER
----- Message from Aislinn Sotelo sent at 6/1/2023  9:57 AM CDT -----  Regarding: RX Refill  Contact: PT @ [phone]  Rx Refill/Request    Is this a Refill or New Rx: refill    Rx Name and Strength: HYDROcodone-acetaminophen (NORCO)  mg per tablet    Preferred Pharmacy with phone number:     [facility]/pharmacy #[id number] - [place], [place] - [address]  [address]  [place] [address]  Phone: [phone] Fax: [phone]    Communication Preference: PT @ [phone]    Additional Information: Pt states that she only has about 4 pills left. Please send refill. Thanks.

## 2023-06-02 RX ORDER — ERGOCALCIFEROL 1.25 MG/1
CAPSULE ORAL
Qty: 12 CAPSULE | Refills: 3 | Status: SHIPPED | OUTPATIENT
Start: 2023-06-02

## 2023-06-03 ENCOUNTER — DOCUMENT SCAN (OUTPATIENT)
Dept: HOME HEALTH SERVICES | Facility: HOSPITAL | Age: 73
End: 2023-06-03
Payer: MEDICARE

## 2023-06-04 DIAGNOSIS — M54.2 CHRONIC NECK PAIN: ICD-10-CM

## 2023-06-04 DIAGNOSIS — G89.29 CHRONIC NECK PAIN: ICD-10-CM

## 2023-06-04 DIAGNOSIS — M79.7 FIBROMYALGIA: ICD-10-CM

## 2023-06-04 DIAGNOSIS — M54.50 CHRONIC MIDLINE LOW BACK PAIN WITHOUT SCIATICA: ICD-10-CM

## 2023-06-04 DIAGNOSIS — G89.29 CHRONIC MIDLINE LOW BACK PAIN WITHOUT SCIATICA: ICD-10-CM

## 2023-06-05 RX ORDER — METHOCARBAMOL 750 MG/1
TABLET, FILM COATED ORAL
Qty: 180 TABLET | Refills: 1 | Status: SHIPPED | OUTPATIENT
Start: 2023-06-05 | End: 2023-08-23

## 2023-06-06 ENCOUNTER — PATIENT MESSAGE (OUTPATIENT)
Dept: INTERNAL MEDICINE | Facility: CLINIC | Age: 73
End: 2023-06-06
Payer: MEDICARE

## 2023-06-07 ENCOUNTER — PES CALL (OUTPATIENT)
Dept: ADMINISTRATIVE | Facility: CLINIC | Age: 73
End: 2023-06-07
Payer: MEDICARE

## 2023-06-14 ENCOUNTER — OFFICE VISIT (OUTPATIENT)
Dept: HOME HEALTH SERVICES | Facility: CLINIC | Age: 73
End: 2023-06-14
Payer: MEDICARE

## 2023-06-14 DIAGNOSIS — I15.2 HYPERTENSION ASSOCIATED WITH DIABETES: ICD-10-CM

## 2023-06-14 DIAGNOSIS — Z79.4 TYPE 2 DIABETES MELLITUS WITH STAGE 4 CHRONIC KIDNEY DISEASE, WITH LONG-TERM CURRENT USE OF INSULIN: ICD-10-CM

## 2023-06-14 DIAGNOSIS — R60.0 BILATERAL LOWER EXTREMITY EDEMA: ICD-10-CM

## 2023-06-14 DIAGNOSIS — E11.59 HYPERTENSION ASSOCIATED WITH DIABETES: ICD-10-CM

## 2023-06-14 DIAGNOSIS — Z93.59 SUPRAPUBIC CATHETER: ICD-10-CM

## 2023-06-14 DIAGNOSIS — Z71.89 COUNSELING REGARDING ADVANCE CARE PLANNING AND GOALS OF CARE: ICD-10-CM

## 2023-06-14 DIAGNOSIS — E11.22 TYPE 2 DIABETES MELLITUS WITH STAGE 4 CHRONIC KIDNEY DISEASE, WITH LONG-TERM CURRENT USE OF INSULIN: ICD-10-CM

## 2023-06-14 DIAGNOSIS — R26.9 ABNORMALITY OF GAIT AND MOBILITY: ICD-10-CM

## 2023-06-14 DIAGNOSIS — Z51.5 PALLIATIVE CARE ENCOUNTER: Primary | ICD-10-CM

## 2023-06-14 DIAGNOSIS — N18.4 TYPE 2 DIABETES MELLITUS WITH STAGE 4 CHRONIC KIDNEY DISEASE, WITH LONG-TERM CURRENT USE OF INSULIN: ICD-10-CM

## 2023-06-14 DIAGNOSIS — Z00.00 ENCOUNTER FOR PREVENTIVE HEALTH EXAMINATION: ICD-10-CM

## 2023-06-14 PROCEDURE — 3288F FALL RISK ASSESSMENT DOCD: CPT | Mod: CPTII,S$GLB,, | Performed by: NURSE PRACTITIONER

## 2023-06-14 PROCEDURE — 3288F PR FALLS RISK ASSESSMENT DOCUMENTED: ICD-10-PCS | Mod: CPTII,S$GLB,, | Performed by: NURSE PRACTITIONER

## 2023-06-14 PROCEDURE — 3066F NEPHROPATHY DOC TX: CPT | Mod: CPTII,S$GLB,, | Performed by: NURSE PRACTITIONER

## 2023-06-14 PROCEDURE — 3080F DIAST BP >= 90 MM HG: CPT | Mod: CPTII,S$GLB,, | Performed by: NURSE PRACTITIONER

## 2023-06-14 PROCEDURE — 99350 HOME/RES VST EST HIGH MDM 60: CPT | Mod: S$GLB,,, | Performed by: NURSE PRACTITIONER

## 2023-06-14 PROCEDURE — 1123F ACP DISCUSS/DSCN MKR DOCD: CPT | Mod: CPTII,S$GLB,, | Performed by: NURSE PRACTITIONER

## 2023-06-14 PROCEDURE — 3072F PR LOW RISK FOR RETINOPATHY: ICD-10-PCS | Mod: CPTII,S$GLB,, | Performed by: NURSE PRACTITIONER

## 2023-06-14 PROCEDURE — 1125F AMNT PAIN NOTED PAIN PRSNT: CPT | Mod: CPTII,S$GLB,, | Performed by: NURSE PRACTITIONER

## 2023-06-14 PROCEDURE — 99497 ADVNCD CARE PLAN 30 MIN: CPT | Mod: S$GLB,,, | Performed by: NURSE PRACTITIONER

## 2023-06-14 PROCEDURE — 3044F HG A1C LEVEL LT 7.0%: CPT | Mod: CPTII,S$GLB,, | Performed by: NURSE PRACTITIONER

## 2023-06-14 PROCEDURE — 99350 PR HOME VISIT,ESTAB PATIENT,LEVEL IV: ICD-10-PCS | Mod: S$GLB,,, | Performed by: NURSE PRACTITIONER

## 2023-06-14 PROCEDURE — 1101F PT FALLS ASSESS-DOCD LE1/YR: CPT | Mod: CPTII,S$GLB,, | Performed by: NURSE PRACTITIONER

## 2023-06-14 PROCEDURE — 3077F PR MOST RECENT SYSTOLIC BLOOD PRESSURE >= 140 MM HG: ICD-10-PCS | Mod: CPTII,S$GLB,, | Performed by: NURSE PRACTITIONER

## 2023-06-14 PROCEDURE — 1123F PR ADV CARE PLAN DISCUSSED, PLAN OR SURROGATE DOCUMENTED: ICD-10-PCS | Mod: CPTII,S$GLB,, | Performed by: NURSE PRACTITIONER

## 2023-06-14 PROCEDURE — 3072F LOW RISK FOR RETINOPATHY: CPT | Mod: CPTII,S$GLB,, | Performed by: NURSE PRACTITIONER

## 2023-06-14 PROCEDURE — 3080F PR MOST RECENT DIASTOLIC BLOOD PRESSURE >= 90 MM HG: ICD-10-PCS | Mod: CPTII,S$GLB,, | Performed by: NURSE PRACTITIONER

## 2023-06-14 PROCEDURE — 3077F SYST BP >= 140 MM HG: CPT | Mod: CPTII,S$GLB,, | Performed by: NURSE PRACTITIONER

## 2023-06-14 PROCEDURE — 3066F PR DOCUMENTATION OF TREATMENT FOR NEPHROPATHY: ICD-10-PCS | Mod: CPTII,S$GLB,, | Performed by: NURSE PRACTITIONER

## 2023-06-14 PROCEDURE — 99497 PR ADVNCD CARE PLAN 30 MIN: ICD-10-PCS | Mod: S$GLB,,, | Performed by: NURSE PRACTITIONER

## 2023-06-14 PROCEDURE — 1125F PR PAIN SEVERITY QUANTIFIED, PAIN PRESENT: ICD-10-PCS | Mod: CPTII,S$GLB,, | Performed by: NURSE PRACTITIONER

## 2023-06-14 PROCEDURE — 3044F PR MOST RECENT HEMOGLOBIN A1C LEVEL <7.0%: ICD-10-PCS | Mod: CPTII,S$GLB,, | Performed by: NURSE PRACTITIONER

## 2023-06-14 PROCEDURE — 1101F PR PT FALLS ASSESS DOC 0-1 FALLS W/OUT INJ PAST YR: ICD-10-PCS | Mod: CPTII,S$GLB,, | Performed by: NURSE PRACTITIONER

## 2023-06-14 RX ORDER — CLONIDINE HYDROCHLORIDE 0.1 MG/1
0.2 TABLET ORAL 3 TIMES DAILY
Qty: 180 TABLET | Refills: 0 | Status: SHIPPED | OUTPATIENT
Start: 2023-06-14 | End: 2023-07-11

## 2023-06-14 NOTE — PATIENT INSTRUCTIONS
FOLLOW-UP INSTRUCTIONS:    Follow-up with palliative care NP in 4-6 weeks on 7/28/2023@home visit  Continue all medications, treatments, and therapies as ordered  Fall precautions at all times  Maintain Safety precautions at all times  Attend all medical appointments as scheduled  F/u with PCP as needed  Patient to have 6 feet between each other  In an Emergency, call 911 or go to ED; notify PCP's office  9. Limit Risks of environmental exposure to coronavirus as discussed including: social distancing, hand hygiene, and limiting departures from the home for necessities only.   10. Patient not to be left alone  11. Low sodium diet  12. Elevate BLE to reduce edema  13. Take all medications as prescribed

## 2023-06-21 PROCEDURE — G0179 MD RECERTIFICATION HHA PT: HCPCS | Mod: ,,, | Performed by: NURSE PRACTITIONER

## 2023-06-21 PROCEDURE — G0179 PR HOME HEALTH MD RECERTIFICATION: ICD-10-PCS | Mod: ,,, | Performed by: NURSE PRACTITIONER

## 2023-06-26 ENCOUNTER — OFFICE VISIT (OUTPATIENT)
Dept: PODIATRY | Facility: CLINIC | Age: 73
End: 2023-06-26
Payer: MEDICARE

## 2023-06-26 VITALS
HEIGHT: 66 IN | HEART RATE: 114 BPM | BODY MASS INDEX: 41.78 KG/M2 | WEIGHT: 260 LBS | DIASTOLIC BLOOD PRESSURE: 93 MMHG | SYSTOLIC BLOOD PRESSURE: 160 MMHG | RESPIRATION RATE: 18 BRPM

## 2023-06-26 DIAGNOSIS — E11.22 TYPE 2 DIABETES MELLITUS WITH STAGE 4 CHRONIC KIDNEY DISEASE, WITH LONG-TERM CURRENT USE OF INSULIN: ICD-10-CM

## 2023-06-26 DIAGNOSIS — E11.51 TYPE II DIABETES MELLITUS WITH PERIPHERAL CIRCULATORY DISORDER: Primary | ICD-10-CM

## 2023-06-26 DIAGNOSIS — R60.0 BILATERAL LOWER EXTREMITY EDEMA: ICD-10-CM

## 2023-06-26 DIAGNOSIS — N18.4 TYPE 2 DIABETES MELLITUS WITH STAGE 4 CHRONIC KIDNEY DISEASE, WITH LONG-TERM CURRENT USE OF INSULIN: ICD-10-CM

## 2023-06-26 DIAGNOSIS — B35.1 ONYCHOMYCOSIS DUE TO DERMATOPHYTE: ICD-10-CM

## 2023-06-26 DIAGNOSIS — Z79.4 TYPE 2 DIABETES MELLITUS WITH STAGE 4 CHRONIC KIDNEY DISEASE, WITH LONG-TERM CURRENT USE OF INSULIN: ICD-10-CM

## 2023-06-26 PROCEDURE — 1159F MED LIST DOCD IN RCRD: CPT | Mod: CPTII,S$GLB,, | Performed by: PODIATRIST

## 2023-06-26 PROCEDURE — 1160F RVW MEDS BY RX/DR IN RCRD: CPT | Mod: CPTII,S$GLB,, | Performed by: PODIATRIST

## 2023-06-26 PROCEDURE — 99999 PR PBB SHADOW E&M-EST. PATIENT-LVL V: CPT | Mod: PBBFAC,,, | Performed by: PODIATRIST

## 2023-06-26 PROCEDURE — 1126F PR PAIN SEVERITY QUANTIFIED, NO PAIN PRESENT: ICD-10-PCS | Mod: CPTII,S$GLB,, | Performed by: PODIATRIST

## 2023-06-26 PROCEDURE — 3008F BODY MASS INDEX DOCD: CPT | Mod: CPTII,S$GLB,, | Performed by: PODIATRIST

## 2023-06-26 PROCEDURE — 3072F PR LOW RISK FOR RETINOPATHY: ICD-10-PCS | Mod: CPTII,S$GLB,, | Performed by: PODIATRIST

## 2023-06-26 PROCEDURE — 99999 PR PBB SHADOW E&M-EST. PATIENT-LVL V: ICD-10-PCS | Mod: PBBFAC,,, | Performed by: PODIATRIST

## 2023-06-26 PROCEDURE — 99203 OFFICE O/P NEW LOW 30 MIN: CPT | Mod: 25,S$GLB,, | Performed by: PODIATRIST

## 2023-06-26 PROCEDURE — 3044F PR MOST RECENT HEMOGLOBIN A1C LEVEL <7.0%: ICD-10-PCS | Mod: CPTII,S$GLB,, | Performed by: PODIATRIST

## 2023-06-26 PROCEDURE — 1159F PR MEDICATION LIST DOCUMENTED IN MEDICAL RECORD: ICD-10-PCS | Mod: CPTII,S$GLB,, | Performed by: PODIATRIST

## 2023-06-26 PROCEDURE — 3080F PR MOST RECENT DIASTOLIC BLOOD PRESSURE >= 90 MM HG: ICD-10-PCS | Mod: CPTII,S$GLB,, | Performed by: PODIATRIST

## 2023-06-26 PROCEDURE — 3077F PR MOST RECENT SYSTOLIC BLOOD PRESSURE >= 140 MM HG: ICD-10-PCS | Mod: CPTII,S$GLB,, | Performed by: PODIATRIST

## 2023-06-26 PROCEDURE — 1160F PR REVIEW ALL MEDS BY PRESCRIBER/CLIN PHARMACIST DOCUMENTED: ICD-10-PCS | Mod: CPTII,S$GLB,, | Performed by: PODIATRIST

## 2023-06-26 PROCEDURE — 1126F AMNT PAIN NOTED NONE PRSNT: CPT | Mod: CPTII,S$GLB,, | Performed by: PODIATRIST

## 2023-06-26 PROCEDURE — 3044F HG A1C LEVEL LT 7.0%: CPT | Mod: CPTII,S$GLB,, | Performed by: PODIATRIST

## 2023-06-26 PROCEDURE — 99203 PR OFFICE/OUTPT VISIT, NEW, LEVL III, 30-44 MIN: ICD-10-PCS | Mod: 25,S$GLB,, | Performed by: PODIATRIST

## 2023-06-26 PROCEDURE — 3072F LOW RISK FOR RETINOPATHY: CPT | Mod: CPTII,S$GLB,, | Performed by: PODIATRIST

## 2023-06-26 PROCEDURE — 3077F SYST BP >= 140 MM HG: CPT | Mod: CPTII,S$GLB,, | Performed by: PODIATRIST

## 2023-06-26 PROCEDURE — 11721 DEBRIDE NAIL 6 OR MORE: CPT | Mod: Q9,S$GLB,, | Performed by: PODIATRIST

## 2023-06-26 PROCEDURE — 3080F DIAST BP >= 90 MM HG: CPT | Mod: CPTII,S$GLB,, | Performed by: PODIATRIST

## 2023-06-26 PROCEDURE — 11721 PR DEBRIDEMENT OF NAILS, 6 OR MORE: ICD-10-PCS | Mod: Q9,S$GLB,, | Performed by: PODIATRIST

## 2023-06-26 PROCEDURE — 3008F PR BODY MASS INDEX (BMI) DOCUMENTED: ICD-10-PCS | Mod: CPTII,S$GLB,, | Performed by: PODIATRIST

## 2023-06-26 NOTE — PROGRESS NOTES
Subjective:      Patient ID: Cecile Bowen is a 72 y.o. female.    Chief Complaint: PCP (DIANN Velez, PRUDENCE  F/U  6/30/23), Diabetic Foot Exam, Nail Care, and Nail Problem (Fungus )    Cecile is a 72 y.o. female who presents to the clinic for evaluation and treatment of high risk feet. Cecile has a past medical history of Cervicogenic migraine, Chronic pain, CKD (chronic kidney disease) stage 4, GFR 15-29 ml/min, CKD (chronic kidney disease) stage 4, GFR 15-29 ml/min, Diabetes mellitus, Dialysis patient (1/21/2022), Fibromyalgia, Hydronephrosis, Hyperlipidemia, Hypertension (12/12/2012), Hypothyroidism (12/12/2012), ARON (iron deficiency anemia), Insomnia, Levoscoliosis, Malignant neoplasm of upper-outer quadrant of left breast in female, estrogen receptor positive, Metabolic acidosis, Mobility impaired, Nuclear sclerosis - Both Eyes (3/24/2014), VIJAYA (obstructive sleep apnea), Osteopenia, Pulmonary nodule, Recurrent UTI, Renal manifestation of secondary diabetes mellitus, Secondary hyperparathyroidism, renal, and Urinary retention. The patient's chief complaint is bilateral LE edema,  she also complains of long, thick toenails. This patient has documented high risk feet requiring routine maintenance secondary to diabetes mellitis and those secondary complications of diabetes, as mentioned.    Current shoe gear socks only    Wheelchair bound    PCP: Kailey Navarro MD    Date Last Seen by PCP:   Chief Complaint   Patient presents with    PCP     DIANN Velez, PRUDENCE  F/U  6/30/23    Diabetic Foot Exam    Nail Care    Nail Problem     Fungus      Hemoglobin A1C   Date Value Ref Range Status   01/19/2023 5.5 4.0 - 5.6 % Final     Comment:     ADA Screening Guidelines:  5.7-6.4%  Consistent with prediabetes  >or=6.5%  Consistent with diabetes    High levels of fetal hemoglobin interfere with the HbA1C  assay. Heterozygous hemoglobin variants (HbS, HgC, etc)do  not significantly interfere with  this assay.   However, presence of multiple variants may affect accuracy.     07/14/2022 5.9 (H) 4.0 - 5.6 % Final     Comment:     ADA Screening Guidelines:  5.7-6.4%  Consistent with prediabetes  >or=6.5%  Consistent with diabetes    High levels of fetal hemoglobin interfere with the HbA1C  assay. Heterozygous hemoglobin variants (HbS, HgC, etc)do  not significantly interfere with this assay.   However, presence of multiple variants may affect accuracy.     01/03/2022 5.4 4.8 - 5.9 % Final           Patient Active Problem List   Diagnosis    Hypothyroidism    Fibromyalgia    Intractable chronic migraine without aura    Vitamin D deficiency disease    Hyperlipidemia associated with type 2 diabetes mellitus    VIJAYA (obstructive sleep apnea)    Hyponatremia    Abnormality of gait and mobility    Osteoarthritis of lumbar spine    Recurrent urinary tract infection    Sideroblastic anemia    Iron deficiency anemia    Primary osteoarthritis involving multiple joints    Urinary retention    Metabolic acidosis    Neurogenic bladder    Major depressive disorder, recurrent episode, moderate    Insomnia    Mixed migraine and muscle contraction headache    Secondary hyperparathyroidism, renal    Suprapubic catheter    Pulmonary nodule    Osteopenia    Morbid obesity due to excess calories    Chronic daily headache    Long term prescription opiate use    Lumbar spondylosis    Chronic pain    Cervicogenic migraine    Malignant neoplasm of left breast, estrogen receptor positive    Risk for falls    Facet arthritis of lumbar region    Obesity, diabetes, and hypertension syndrome    Requires daily assistance for activities of daily living (ADL) and comfort needs    S/P left mastectomy    Prophylactic use of anastrozole (Arimidex)    Type 2 diabetes mellitus with stage 4 chronic kidney disease, with long-term current use of insulin    High priority for 2019 novel coronavirus vaccination    CKD (chronic kidney disease) stage 4, GFR  "15-29 ml/min    Nephrotic range proteinuria    Left ureteral stone    Nephrolithiasis    Transaminitis    Chronic obstructive pyelonephritis    UTI (urinary tract infection)    Normocytic anemia    ZAK (acute kidney injury)    A-fib    Debility    Encounter for palliative care    Constipation    Hypertension associated with diabetes    Chronic kidney disease-mineral and bone disorder    Anemia of chronic disease    Urinary tract infection due to ESBL Klebsiella    Pressure injury of right buttock, stage 3    Acute cystitis with hematuria    Uncomplicated opioid dependence    History of falling    Long term current use of insulin    Personal history of malignant neoplasm of breast    Aortic atherosclerosis    Diabetic polyneuropathy associated with type 2 diabetes mellitus       Current Outpatient Medications on File Prior to Visit   Medication Sig Dispense Refill    anastrozole (ARIMIDEX) 1 mg Tab Take 1 tablet (1 mg total) by mouth once daily. 90 tablet 2    apixaban (ELIQUIS) 2.5 mg Tab Take 2.5 mg by mouth 2 (two) times daily.      atorvastatin (LIPITOR) 80 MG tablet Take 1 tablet (80 mg total) by mouth once daily. 90 tablet 3    BD INSULIN SYRINGE ULTRA-FINE 0.5 mL 31 gauge x 5/16" Syrg USE WITH INSULIN 4 TIMES A  each 12    cloNIDine (CATAPRES) 0.1 MG tablet TAKE 2 TABLETS (0.2 MG TOTAL) BY MOUTH 3 (THREE) TIMES DAILY. 180 tablet 0    diclofenac sodium (VOLTAREN) 1 % Gel Apply to chest wall as needed for arthritis      DULoxetine (CYMBALTA) 20 MG capsule TAKE 2 CAPSULES BY MOUTH EVERY  capsule 1    erenumab-aooe (AIMOVIG) 140 mg/mL autoinjector Inject 1 mL (140 mg total) into the skin every 30 days. 1 mL 11    ergocalciferol (ERGOCALCIFEROL) 50,000 unit Cap TAKE ONE CAPSULE BY MOUTH ONE TIME PER WEEK, TAKES ON TUESDAYS 12 capsule 3    HYDROcodone-acetaminophen (NORCO)  mg per tablet Take 1 tablet by mouth every 6 (six) hours as needed for Pain. 120 tablet 0    insulin (LANTUS SOLOSTAR U-100 " "INSULIN) glargine 100 units/mL SubQ pen INJECT 14-15 UNITS UNDER THE SKIN ONCE DAILY 15 each 3    insulin lispro 100 unit/mL injection Use if sugar is > 180, 180-230+2, etc. Max daily 30 units- may substitute novolog -same dosing. 30 mL 6    methocarbamoL (ROBAXIN) 750 MG Tab TAKE 1-2 TABLETS (750-1,500 MG TOTAL) BY MOUTH 3 (THREE) TIMES DAILY AS NEEDED (MUSCLE SPASMS). 180 tablet 1    ondansetron (ZOFRAN-ODT) 8 MG TbDL TAKE 1 TABLET BY MOUTH EVERY 12 HOURS AS NEEDED 30 tablet 2    oxybutynin (DITROPAN-XL) 10 MG 24 hr tablet Take 1 tablet (10 mg total) by mouth once daily. 100 tablet 3    pen needle, diabetic (BD ULTRA-FINE SHORT PEN NEEDLE) 31 gauge x 5/16" Ndle USE WITH LANTUS DAILY, e 11.65 100 each 3    sodium bicarbonate 650 MG tablet Take 1 tablet (650 mg total) by mouth 3 (three) times daily. 90 tablet 11    sumatriptan (IMITREX) 100 MG tablet TAKE 1 TABLET (100 MG TOTAL) BY MOUTH 2 (TWO) TIMES DAILY AS NEEDED FOR MIGRAINE. 9 tablet 11    SYNTHROID 50 mcg tablet TAKE 1 TABLET BY MOUTH BEFORE BREAKFAST. ADMINISTER ON AN EMPTY STOMACH AT LEAST 30 MINUTES BEFORE FOOD. IF RECEIVING TUBE FEEDS, HOLD TUBE FEEDS FOR 1 HOUR BEFORE AND AFTER LEVOTHYROXINE ADMINISTRATION. 90 tablet 2    traZODone (DESYREL) 100 MG tablet TAKE 1 TABLET BY MOUTH NIGHTLY AS NEEDED FOR INSOMNIA. 90 tablet 2     No current facility-administered medications on file prior to visit.       Review of patient's allergies indicates:  No Known Allergies    Past Surgical History:   Procedure Laterality Date    BIOPSY OF URETER Left 2/18/2022    Procedure: BIOPSY, URETER;  Surgeon: Ronel Whittington MD;  Location: [facility] OR 1ST FLR;  Service: Urology;  Laterality: Left;    BREAST BIOPSY Right     benign    CHOLECYSTECTOMY      COLONOSCOPY N/A 1/13/2017    Procedure: COLONOSCOPY;  Surgeon: Morris Wiseman MD;  Location: [facility] (4TH FLR);  Service: Endoscopy;  Laterality: N/A;  Renal pt Nephrology advised to avoid phosphate preps    CYSTOSCOPY " N/A 10/8/2018    Procedure: CYSTOSCOPY;  Surgeon: Ronel Whittington MD;  Location: [facility] OR 1ST FLR;  Service: Urology;  Laterality: N/A;  45 min    CYSTOSCOPY N/A 3/25/2019    Procedure: CYSTOSCOPY;  Surgeon: Ronel Whittington MD;  Location: [facility] OR 1ST FLR;  Service: Urology;  Laterality: N/A;  45 min    CYSTOSCOPY N/A 8/26/2019    Procedure: CYSTOSCOPY;  Surgeon: Ronel Whittington MD;  Location: [facility] OR 1ST FLR;  Service: Urology;  Laterality: N/A;  45 min    CYSTOSCOPY N/A 7/2/2021    Procedure: CYSTOSCOPY;  Surgeon: Ronel Whittington MD;  Location: [facility] OR 1ST FLR;  Service: Urology;  Laterality: N/A;    CYSTOSCOPY  12/23/2021    Procedure: CYSTOSCOPY;  Surgeon: Ronel Whittington MD;  Location: [facility] OR 1ST FLR;  Service: Urology;;    CYSTOSCOPY  2/18/2022    Procedure: CYSTOSCOPY;  Surgeon: Ronel Whittington MD;  Location: [facility] OR 1ST FLR;  Service: Urology;;    CYSTOSCOPY W/ URETERAL STENT PLACEMENT Left 5/15/2021    Procedure: CYSTOSCOPY, WITH URETERAL STENT INSERTION;  Surgeon: Levy Sánchez Jr., MD;  Location: [facility] OR 1ST FLR;  Service: Urology;  Laterality: Left;    CYSTOSCOPY WITH BIOPSY OF BLADDER N/A 1/27/2020    Procedure: CYSTOSCOPY, WITH BLADDER BIOPSY, WITH FULGURATION IF INDICATED;  Surgeon: Ronel Whittington MD;  Location: [facility] OR 1ST FLR;  Service: Urology;  Laterality: N/A;    DILATION AND CURETTAGE OF UTERUS      GALLBLADDER SURGERY  2006    HYSTERECTOMY      INJECTION FOR SENTINEL NODE IDENTIFICATION Left 8/1/2019    Procedure: INJECTION, FOR SENTINEL NODE IDENTIFICATION;  Surgeon: Samia Fulton MD;  Location: [facility] OR 2ND FLR;  Service: General;  Laterality: Left;    INJECTION OF BOTULINUM TOXIN TYPE A N/A 10/8/2018    Procedure: INJECTION, BOTULINUM TOXIN, TYPE A 300 UNITS;  Surgeon: Ronel Whittington MD;  Location: [facility] OR 1ST FLR;  Service: Urology;  Laterality: N/A;    INJECTION OF BOTULINUM TOXIN TYPE A N/A 3/25/2019    Procedure: INJECTION,  BOTULINUM TOXIN, TYPE A 300 UNITS;  Surgeon: Ronel Whittington MD;  [facility];  Service: Urology;  Laterality: N/A;    INJECTION OF BOTULINUM TOXIN TYPE A N/A 8/26/2019    Procedure: INJECTION, BOTULINUM TOXIN, TYPE A 300 UNITS;  Surgeon: Ronel Whittington MD;  [facility];  Service: Urology;  Laterality: N/A;    MASTECTOMY Left 8/1/2019    Procedure: MASTECTOMY 23 hour stay;  Surgeon: Samia Fulton MD;  Location: [facility] OR 2ND FLR;  Service: General;  Laterality: Left;    MEDIPORT REMOVAL Right 6/24/2022    Procedure: REMOVAL, CATHETER, CENTRAL VENOUS, TUNNELED, WITH PORT;  Surgeon: Isauro Anderson MD;  Location: [facility] CATH LAB;  Service: Radiology;  Laterality: Right;    OVARIAN CYST SURGERY  1985    REPLACEMENT OF STENT Left 12/23/2021    Procedure: REPLACEMENT, STENT;  Surgeon: Ronel Whittington MD;  [facility];  Service: Urology;  Laterality: Left;    REPLACEMENT OF STENT Left 2/18/2022    Procedure: REPLACEMENT, STENT;  Surgeon: Ronel Whittington MD;  [facility];  Service: Urology;  Laterality: Left;    RETROGRADE PYELOGRAPHY Left 2/18/2022    Procedure: PYELOGRAM, RETROGRADE;  Surgeon: Ronel Whittington MD;  Location: [facility] OR [address] [place] [address];  Service: Urology;  Laterality: Left;    SENTINEL LYMPH NODE BIOPSY Left 8/1/2019    Procedure: BIOPSY, LYMPH NODE, SENTINEL;  Surgeon: Samia Fulton MD;  Location: [facility] OR 2ND FLR;  Service: General;  Laterality: Left;    spt placement      TONSILLECTOMY, ADENOIDECTOMY      TOTAL ABDOMINAL HYSTERECTOMY W/ BILATERAL SALPINGOOPHORECTOMY  1985    URETEROSCOPY Left 2/18/2022    Procedure: URETEROSCOPY;  Surgeon: Ronel Whittington MD;  Location: [facility] OR [address] [place] [address];  Service: Urology;  Laterality: Left;       Family History   Problem Relation Age of Onset    Diabetes Sister     Kidney disease Sister         CKD III    ALS Mother         d.    Cancer Maternal Grandmother         d. colon     Cancer Paternal Grandfather         d. lung    Scoliosis Brother         increased pain    Prostate cancer Brother         cured s/p surgery    Cancer Brother         rare cancer that got into the bones    Diabetes Maternal Aunt     Kidney disease Maternal Aunt     Diabetes Maternal Uncle     Amblyopia Neg Hx     Blindness Neg Hx     Cataracts Neg Hx     Glaucoma Neg Hx     Macular degeneration Neg Hx     Retinal detachment Neg Hx     Strabismus Neg Hx        Social History     Socioeconomic History    Marital status:     Number of children: 0    Highest education level: Master's degree (e.g., MA, MS, Lelo, MEd, MSW, BANDAR)   Occupational History    Occupation: teacher     Comment: retired   Tobacco Use    Smoking status: Never    Smokeless tobacco: Never   Substance and Sexual Activity    Alcohol use: No     Alcohol/week: 0.0 standard drinks    Drug use: No    Sexual activity: Not Currently     Birth control/protection: Abstinence   Social History Narrative    Single ()        Lives w/ sister and brother. Pt needs to keep her narcotics hidden from her brother who will steal them         Brother passed away last year.  Pt lives her her sister. (5/27)     Social Determinants of Health     Financial Resource Strain: Low Risk     Difficulty of Paying Living Expenses: Not hard at all   Food Insecurity: No Food Insecurity    Worried About Running Out of Food in the Last Year: Never true    Ran Out of Food in the Last Year: Never true   Transportation Needs: No Transportation Needs    Lack of Transportation (Medical): No    Lack of Transportation (Non-Medical): No   Physical Activity: Inactive    Days of Exercise per Week: 0 days    Minutes of Exercise per Session: 0 min   Stress: No Stress Concern Present    Feeling of Stress : Not at all   Social Connections: Moderately Isolated    Frequency of Communication with Friends and Family: More than three times a week    Frequency of Social Gatherings with  "Friends and Family: Twice a week    Attends Congregational Services: More than 4 times per year    Active Member of Clubs or Organizations: No    Attends Club or Organization Meetings: Never    Marital Status:    Housing Stability: Low Risk     Unable to Pay for Housing in the Last Year: No    Number of Places Lived in the Last Year: 1    Unstable Housing in the Last Year: No       Review of Systems   Constitutional: Negative for chills and fever.   Cardiovascular:  Positive for leg swelling. Negative for claudication.   Respiratory:  Negative for cough and shortness of breath.    Skin:  Positive for dry skin and nail changes. Negative for itching and rash.   Musculoskeletal:  Positive for arthritis, back pain, joint pain, myalgias and stiffness. Negative for falls, joint swelling and muscle weakness.   Gastrointestinal:  Negative for diarrhea, nausea and vomiting.   Neurological:  Positive for paresthesias. Negative for numbness, tremors and weakness.   Psychiatric/Behavioral:  Negative for altered mental status and hallucinations.          Objective:      Vitals:    06/26/23 1120   BP: (!) 160/93   Pulse: (!) 114   Resp: 18   Weight: 117.9 kg (260 lb)   Height: 5' 6" (1.676 m)   PainSc: 0-No pain       Physical Exam  Vitals and nursing note reviewed.   Constitutional:       General: She is not in acute distress.     Appearance: She is well-developed. She is not toxic-appearing or diaphoretic.      Comments: alert and oriented x 3.    Cardiovascular:      Pulses:           Dorsalis pedis pulses are 1+ on the right side and 1+ on the left side.        Posterior tibial pulses are 1+ on the right side and 1+ on the left side.      Comments: Dorsalis pedis and posterior tibial pulses are diminished Bilaterally. Toes are cool to touch. Feet are warm proximally.There is decreased digital hair. Skin is atrophic, slightly hyperpigmented  Pulmonary:      Effort: No respiratory distress.   Musculoskeletal:         " General: No deformity.      Right lower leg: Edema present.      Left lower leg: Edema present.      Right ankle: No tenderness. No lateral malleolus, medial malleolus, AITF ligament, CF ligament or posterior TF ligament tenderness.      Right Achilles Tendon: No defects. Jamil's test negative.      Left ankle: No tenderness. No lateral malleolus, medial malleolus, AITF ligament, CF ligament or posterior TF ligament tenderness.      Left Achilles Tendon: No defects. Jamil's test negative.      Right foot: No tenderness or bony tenderness.      Left foot: No tenderness or bony tenderness.      Comments: Adequate joint range of motion without pain, limitation, nor crepitation Bilateral feet and ankle joints. Muscle strength is 5/5 in all groups bilaterally.           Feet:      Right foot:      Protective Sensation: 5 sites tested.  5 sites sensed.      Skin integrity: Skin integrity normal.      Left foot:      Protective Sensation: 5 sites tested.  5 sites sensed.      Skin integrity: Skin integrity normal.   Lymphadenopathy:      Comments: No lymphatic streaking     Skin:     General: Skin is warm and dry.      Coloration: Skin is not pale.      Findings: No rash.      Nails: There is no clubbing.      Comments: Toenails 1-5 bilaterally are elongated by 2-3 mm, thickened by 2-3 mm, discolored/yellowed, dystrophic, brittle with subungual debris   Neurological:      Sensory: No sensory deficit.      Motor: No atrophy.      Comments: Light touch present     Psychiatric:         Attention and Perception: She is attentive.         Mood and Affect: Mood is not anxious. Affect is not inappropriate.         Speech: She is communicative. Speech is not slurred.         Behavior: Behavior is not combative.             Assessment:       Encounter Diagnoses   Name Primary?    Type 2 diabetes mellitus with stage 4 chronic kidney disease, with long-term current use of insulin     Type II diabetes mellitus with peripheral  circulatory disorder Yes    Onychomycosis due to dermatophyte     Bilateral lower extremity edema          Plan:     Problem List Items Addressed This Visit       Type 2 diabetes mellitus with stage 4 chronic kidney disease, with long-term current use of insulin     Other Visit Diagnoses       Type II diabetes mellitus with peripheral circulatory disorder    -  Primary    Relevant Orders    Ambulatory referral/consult to Vascular Surgery    Onychomycosis due to dermatophyte        Bilateral lower extremity edema        Relevant Orders    Ambulatory referral/consult to Vascular Surgery           I counseled the patient on her conditions, their implications and medical management.    Orders Placed This Encounter    Ambulatory referral/consult to Vascular Surgery       Greater than 50% of this visit spent on counseling and coordination of care.    Education about the diabetic foot, neuropathy, and prevention of limb loss.    Shoe inspection. Diabetic Foot Education. Patient reminded of the importance of good nutrition/healthy diet/weight management and blood sugar control to help prevent podiatric complications of diabetes. Patient instructed on proper foot hygeine. Wear comfortable, proper fitting shoes. Wash feet daily. Dry well. After drying, apply moisturizer to feet (no lotion to webspaces). Inspect feet daily for skin breaks, blisters, swelling, or redness. Wear cotton socks (preferably white)  Change socks every day. Do NOT walk barefoot. Do NOT use heating pads or hot water soaks. We discussed wearing proper shoe gear, daily foot inspections, never walking without protective shoe gear.     Discussed edema control and the importance of daily moisturizer to the feet such as Gold bonds diabetic foot cream    Recommend applying vicks vaporub to thick abnormal toenails daily x 6 months to treat fungal nail infection.     Referral to vascular surgery for LE edema    With patient's permission, nails were aggressively  reduced and debrided x 10 to their soft tissue attachment mechanically and with electric , removing all offending nail and debris. Patient relates relief following the procedure.     She will continue to monitor the areas daily, inspect her feet, wear protective shoe gear when ambulatory, moisturizer to maintain skin integrity and follow in this office in approximately 3-5 months, sooner p.r.n.

## 2023-06-26 NOTE — PATIENT INSTRUCTIONS
Over the counter pain creams: Voltaren Gel, Biofreeze, Bengay, tiger balm, two old goat, lidocaine gel,  Absorbine Veterinary Liniment Gel Topical Analgesic Sore Muscle and Joint Pain Relief  Recommend lotions: eucerin, eucerin for diabetics, aquaphor, A&D ointment, gold bond for diabetics, sween, [place]'s Bees all purpose baby ointment,  urea 40 with aloe or SkinIntegra rapid crack repair (found on amazon.com)  Shoe recommendations: (try 6pm.com, zappos.com , nordstromrack.MEETiiN, or shoes.com for discounted prices) you can visit varsity shoes in [place], DSW shoes in [place]  or be rack in the [facility] (there are also several shoe brand outlets in the [facility])  ONLY purchase stability style tennis shoes NO flex, foam, free, yoga mat style shoes  Asics (GT 4000 or gel foundations), new balance stability type shoes (such as the 940 series), saucony (stabil c3),  Roblero (GTS or Beast or transcend), propet, HokaOne (tennis shoe) Kendall (tennis shoes and boots)  Sofft Brand (women) Shirley&Mark (men), clarks, crocs, aerosoles, naturalizers, SAS, ecco, born, marlo vitale, rockports (dress shoes)  Vionic, burkenstocks, fitflops, propet, taos, baretraps (sandals)  HokaOne sandals, crocs, propet (house shoes)    Nail Home remedy:  Vicks Vapor rub or Emuaid to nails for easier manageability    Diabetes: Inspecting Your Feet  Diabetes increases your chances of developing foot problems. So inspect your feet every day. This helps you find small skin irritations before they become serious infections. If you have trouble seeing the bottoms of your feet, use a mirror or ask a family member or friend to help.     Pressure spots on the bottom of the foot are common areas where problems develop.   How to check your feet  Below are tips to help you look for foot problems. Try to check your feet at the same time each day, such as when you get out of bed in the morning:  Check the top of each foot. The tops of toes, back of  the heel, and outer edge of the foot can get a lot of rubbing from poor-fitting shoes.  Check the bottom of each foot. Daily wear and tear often leads to problems at pressure spots.  Check the toes and nails. Fungal infections often occur between toes. Toenail problems can also be a sign of fungal infections or lead to breaks in the skin.  Check your shoes, too. Loose objects inside a shoe can injure the foot. Use your hand to feel inside your shoes for things like roshan, loose stitching, or rough areas that could irritate your skin.  Warning signs  Look for any color changes in the foot. Redness with streaks can signal a severe infection, which needs immediate medical attention. Tell your doctor right away if you have any of these problems:  Swelling, sometimes with color changes, may be a sign of poor blood flow or infection. Symptoms include tenderness and an increase in the size of your foot.  Warm or hot areas on your feet may be signs of infection. A foot that is cold may not be getting enough blood.  Sensations such as burning, tingling, or pins and needles can be signs of a problem. Also check for areas that may be numb.  Hot spots are caused by friction or pressure. Look for hot spots in areas that get a lot of rubbing. Hot spots can turn into blisters, calluses, or sores.  Cracks and sores are caused by dry or irritated skin. They are a sign that the skin is breaking down, which can lead to infection.  Toenail problems to watch for include nails growing into the skin (ingrown toenail) and causing redness or pain. Thick, yellow, or discolored nails can signal a fungal infection.  Drainage and odor can develop from untreated sores and ulcers. Call your doctor right away if you notice white or yellow drainage, bleeding, or unpleasant odor.   © 6377-1425 [employer]. [address] [place] [address], [place] [address]. All rights reserved. This information is not intended as a substitute for  professional medical care. Always follow your healthcare professional's instructions.        Step-by-Step:  Inspecting Your Feet (Diabetes)    Date Last Reviewed: 10/1/2016  © 7621-9846 [employer]. [address] [place] [address], [place] [address]. All rights reserved. This information is not intended as a substitute for professional medical care. Always follow your healthcare professional's instructions.

## 2023-06-29 ENCOUNTER — TELEPHONE (OUTPATIENT)
Dept: INTERNAL MEDICINE | Facility: CLINIC | Age: 73
End: 2023-06-29
Payer: MEDICARE

## 2023-06-29 NOTE — TELEPHONE ENCOUNTER
----- Message from Lorraine Warren sent at 6/29/2023  9:18 AM CDT -----  Contact: [phone] @ patient  Good morning patient would like a call back to see if she could change her apt on tomorrow 6/30 to a virtual visit or change the date patient says its to hot outside. Please give patient a call back [phone]

## 2023-06-29 NOTE — TELEPHONE ENCOUNTER
Spoke to pt. Pt stated she is feeling too run down and tired to go out in the heat. Rescheduled appts

## 2023-07-05 ENCOUNTER — DOCUMENT SCAN (OUTPATIENT)
Dept: HOME HEALTH SERVICES | Facility: HOSPITAL | Age: 73
End: 2023-07-05
Payer: MEDICARE

## 2023-07-05 ENCOUNTER — EXTERNAL HOME HEALTH (OUTPATIENT)
Dept: HOME HEALTH SERVICES | Facility: HOSPITAL | Age: 73
End: 2023-07-05
Payer: MEDICARE

## 2023-07-06 DIAGNOSIS — E11.9 TYPE 2 DIABETES MELLITUS WITHOUT COMPLICATION: ICD-10-CM

## 2023-07-10 ENCOUNTER — DOCUMENT SCAN (OUTPATIENT)
Dept: HOME HEALTH SERVICES | Facility: HOSPITAL | Age: 73
End: 2023-07-10
Payer: MEDICARE

## 2023-07-11 RX ORDER — CLONIDINE HYDROCHLORIDE 0.1 MG/1
0.2 TABLET ORAL 3 TIMES DAILY
Qty: 180 TABLET | Refills: 0 | Status: ON HOLD | OUTPATIENT
Start: 2023-07-11 | End: 2023-08-09 | Stop reason: HOSPADM

## 2023-07-13 DIAGNOSIS — N18.4 CKD (CHRONIC KIDNEY DISEASE) STAGE 4, GFR 15-29 ML/MIN: Primary | ICD-10-CM

## 2023-07-13 DIAGNOSIS — R80.9 TYPE 2 DIABETES MELLITUS WITH PROTEINURIA: ICD-10-CM

## 2023-07-13 DIAGNOSIS — E87.20 METABOLIC ACIDOSIS: ICD-10-CM

## 2023-07-13 DIAGNOSIS — E11.29 TYPE 2 DIABETES MELLITUS WITH PROTEINURIA: ICD-10-CM

## 2023-07-13 DIAGNOSIS — D50.8 OTHER IRON DEFICIENCY ANEMIA: ICD-10-CM

## 2023-07-26 DIAGNOSIS — G89.29 CHRONIC MIDLINE LOW BACK PAIN WITHOUT SCIATICA: ICD-10-CM

## 2023-07-26 DIAGNOSIS — M79.89 PAIN AND SWELLING OF LOWER EXTREMITY, UNSPECIFIED LATERALITY: Primary | ICD-10-CM

## 2023-07-26 DIAGNOSIS — M54.50 CHRONIC MIDLINE LOW BACK PAIN WITHOUT SCIATICA: ICD-10-CM

## 2023-07-26 DIAGNOSIS — M54.2 CHRONIC NECK PAIN: ICD-10-CM

## 2023-07-26 DIAGNOSIS — G89.29 CHRONIC NECK PAIN: ICD-10-CM

## 2023-07-26 DIAGNOSIS — I80.03 PHLEBITIS AND THROMBOPHLEBITIS OF SUPERFICIAL VESSELS OF LOWER EXTREMITIES, BILATERAL: ICD-10-CM

## 2023-07-26 DIAGNOSIS — M79.606 PAIN AND SWELLING OF LOWER EXTREMITY, UNSPECIFIED LATERALITY: Primary | ICD-10-CM

## 2023-07-26 RX ORDER — HYDROCODONE BITARTRATE AND ACETAMINOPHEN 10; 325 MG/1; MG/1
1 TABLET ORAL EVERY 6 HOURS PRN
Qty: 120 TABLET | Refills: 0 | Status: ON HOLD | OUTPATIENT
Start: 2023-07-26 | End: 2023-08-09 | Stop reason: HOSPADM

## 2023-07-29 DIAGNOSIS — G43.711 INTRACTABLE CHRONIC MIGRAINE WITHOUT AURA AND WITH STATUS MIGRAINOSUS: ICD-10-CM

## 2023-07-29 NOTE — PROGRESS NOTES
73 y.o. femalepresents in f/u to diabetes, hypertension, and dyslipidemia  DM w/ neuropathy/ CKD 4/tx w/  Denied increased thirst, urination, or hypoglycemia episodes  A1C ==>    Lab Results   Component Value Date    HGBA1C 5.5 01/19/2023         HTN associated DM tx w/  Denied HA or dizziness  BP today==>136/62    Dyslipidemia associated w/ DM tx w/  Denied unusual muscle pain or chest pain  LDL==>  Lab Results   Component Value Date    CHOL 190 01/31/2023    CHOL 161 02/09/2022    CHOL 98 01/03/2022     Lab Results   Component Value Date    HDL 37 (L) 01/31/2023    HDL 28 02/09/2022    HDL 26 01/03/2022     Lab Results   Component Value Date    LDLCALC 127.4 01/31/2023    LDLCALC 94 02/09/2022    LDLCALC 46 01/03/2022     Lab Results   Component Value Date    TRIG 128 01/31/2023    TRIG 194 (H) 02/09/2022    TRIG 132 01/03/2022     Lab Results   Component Value Date    CHOLHDL 19.5 (L) 01/31/2023    CHOLHDL 5.8 (H) 02/09/2022    CHOLHDL 3.8 01/03/2022     Hypothyroidism, stable off RX  Denied heat or cold intolerance  TSH==> 3.622    ROS:    + dry mouth  No fever, chills, or night sweats  No blurry vision or visual disturbance  No dysphagia or early satiety  No palpitations or tachycardia  No cough or wheezing  No GERD or abdominal pain  No numbness or focal deficits  Remainder of review negative except as previously noted    PAST MEDICAL HX: Reviewed  PAST SURGICAL HX: Reviewed  SOCIAL HX: Reviewed  FAMILY HX: Reviewed    PHYSICAL EXAM:  VS: As noted  GENERAL: WDWN, A&O, NAD, conversant and co-operative  EYES: Conj/lids unremarkable, sclera anicteric,   NECK: Supple w/o lymphadenopathy or thyromegaly  RESPIRATORY: Efforts are unlabored. Lungs CTAP  CARDIOVASCULAR: Heart ST. No carotid bruits noted.   1+ pedal pulses. 2+ LE edema  GASTROINTESTINAL: BS+, soft , NT/ND, no HSM noted  MUSCULOSKELETAL: Gait impaired, patient in wheelchair   No CCE  NEUROLOGIC: DONALD. No tremor noted  SKIN: Warm and  dry    IMPRESSION/PLAN:  DM w/ neuropathy, stable   -continue Lantus 15 units q.day  -continue f/up MS. Gabriel  CKD 4 2/2 DM  -lab today  Hypertension associated with diabetes, stable   -continue clonidine 0.1 b.i.d.  Hyperlipidemia associated with diabetes, stable   -continue atorvastatin 80 mg q.day  Hypothyroidism, stable   -continue Synthroid 50 mcg q.d.  Afib, stable   -continue Eliquis 2.5 b.i.d.  Sinus tachycardia, asx  Breast cancer, stable   -continue Arimidex 1 mg q.day  Mild major depression, stable   -continue Cymbalta 20 mg b.i.d.

## 2023-07-31 RX ORDER — SUMATRIPTAN SUCCINATE 100 MG/1
100 TABLET ORAL 2 TIMES DAILY PRN
Qty: 9 TABLET | Refills: 2 | Status: ON HOLD | OUTPATIENT
Start: 2023-07-31 | End: 2023-08-09 | Stop reason: HOSPADM

## 2023-08-01 ENCOUNTER — TELEPHONE (OUTPATIENT)
Dept: NEPHROLOGY | Facility: CLINIC | Age: 73
End: 2023-08-01
Payer: MEDICARE

## 2023-08-01 NOTE — TELEPHONE ENCOUNTER
"----- Message from Miya Portillo sent at 8/1/2023  8:09 AM CDT -----  Pt Requesting Call Back    Who called: pt  Who called for pt:  Best call back #:   Add notes: pt said she requests a call back from the nurse "due to her appt the day after tomorrow"     "

## 2023-08-04 ENCOUNTER — OFFICE VISIT (OUTPATIENT)
Dept: INTERNAL MEDICINE | Facility: CLINIC | Age: 73
End: 2023-08-04
Payer: MEDICARE

## 2023-08-04 VITALS
WEIGHT: 260 LBS | DIASTOLIC BLOOD PRESSURE: 62 MMHG | BODY MASS INDEX: 39.4 KG/M2 | TEMPERATURE: 97 F | HEIGHT: 68 IN | OXYGEN SATURATION: 96 % | HEART RATE: 112 BPM | RESPIRATION RATE: 16 BRPM | SYSTOLIC BLOOD PRESSURE: 136 MMHG

## 2023-08-04 DIAGNOSIS — Z79.4 TYPE 2 DIABETES MELLITUS WITH DIABETIC NEUROPATHY, WITH LONG-TERM CURRENT USE OF INSULIN: Primary | ICD-10-CM

## 2023-08-04 DIAGNOSIS — E11.22 STAGE 4 CHRONIC KIDNEY DISEASE DUE TO TYPE 2 DIABETES MELLITUS: ICD-10-CM

## 2023-08-04 DIAGNOSIS — N18.4 STAGE 4 CHRONIC KIDNEY DISEASE DUE TO TYPE 2 DIABETES MELLITUS: ICD-10-CM

## 2023-08-04 DIAGNOSIS — E78.5 HYPERLIPIDEMIA ASSOCIATED WITH TYPE 2 DIABETES MELLITUS: ICD-10-CM

## 2023-08-04 DIAGNOSIS — E03.9 ACQUIRED HYPOTHYROIDISM: Chronic | ICD-10-CM

## 2023-08-04 DIAGNOSIS — E11.69 HYPERLIPIDEMIA ASSOCIATED WITH TYPE 2 DIABETES MELLITUS: ICD-10-CM

## 2023-08-04 DIAGNOSIS — I48.91 ATRIAL FIBRILLATION, UNSPECIFIED TYPE: ICD-10-CM

## 2023-08-04 DIAGNOSIS — E11.40 TYPE 2 DIABETES MELLITUS WITH DIABETIC NEUROPATHY, WITH LONG-TERM CURRENT USE OF INSULIN: Primary | ICD-10-CM

## 2023-08-04 DIAGNOSIS — E11.59 HYPERTENSION ASSOCIATED WITH DIABETES: ICD-10-CM

## 2023-08-04 DIAGNOSIS — Z12.11 COLON CANCER SCREENING: Primary | ICD-10-CM

## 2023-08-04 DIAGNOSIS — I15.2 HYPERTENSION ASSOCIATED WITH DIABETES: ICD-10-CM

## 2023-08-04 PROCEDURE — 1101F PT FALLS ASSESS-DOCD LE1/YR: CPT | Mod: HCNC,CPTII,S$GLB, | Performed by: INTERNAL MEDICINE

## 2023-08-04 PROCEDURE — 3044F HG A1C LEVEL LT 7.0%: CPT | Mod: HCNC,CPTII,S$GLB, | Performed by: INTERNAL MEDICINE

## 2023-08-04 PROCEDURE — 3288F PR FALLS RISK ASSESSMENT DOCUMENTED: ICD-10-PCS | Mod: HCNC,CPTII,S$GLB, | Performed by: INTERNAL MEDICINE

## 2023-08-04 PROCEDURE — 3075F SYST BP GE 130 - 139MM HG: CPT | Mod: HCNC,CPTII,S$GLB, | Performed by: INTERNAL MEDICINE

## 2023-08-04 PROCEDURE — 3288F FALL RISK ASSESSMENT DOCD: CPT | Mod: HCNC,CPTII,S$GLB, | Performed by: INTERNAL MEDICINE

## 2023-08-04 PROCEDURE — 1159F PR MEDICATION LIST DOCUMENTED IN MEDICAL RECORD: ICD-10-PCS | Mod: HCNC,CPTII,S$GLB, | Performed by: INTERNAL MEDICINE

## 2023-08-04 PROCEDURE — 99999 PR PBB SHADOW E&M-EST. PATIENT-LVL V: ICD-10-PCS | Mod: PBBFAC,HCNC,, | Performed by: INTERNAL MEDICINE

## 2023-08-04 PROCEDURE — 1159F MED LIST DOCD IN RCRD: CPT | Mod: HCNC,CPTII,S$GLB, | Performed by: INTERNAL MEDICINE

## 2023-08-04 PROCEDURE — 3078F DIAST BP <80 MM HG: CPT | Mod: HCNC,CPTII,S$GLB, | Performed by: INTERNAL MEDICINE

## 2023-08-04 PROCEDURE — 3072F PR LOW RISK FOR RETINOPATHY: ICD-10-PCS | Mod: HCNC,CPTII,S$GLB, | Performed by: INTERNAL MEDICINE

## 2023-08-04 PROCEDURE — 3078F PR MOST RECENT DIASTOLIC BLOOD PRESSURE < 80 MM HG: ICD-10-PCS | Mod: HCNC,CPTII,S$GLB, | Performed by: INTERNAL MEDICINE

## 2023-08-04 PROCEDURE — 99214 PR OFFICE/OUTPT VISIT, EST, LEVL IV, 30-39 MIN: ICD-10-PCS | Mod: HCNC,S$GLB,, | Performed by: INTERNAL MEDICINE

## 2023-08-04 PROCEDURE — 99999 PR PBB SHADOW E&M-EST. PATIENT-LVL V: CPT | Mod: PBBFAC,HCNC,, | Performed by: INTERNAL MEDICINE

## 2023-08-04 PROCEDURE — 3044F PR MOST RECENT HEMOGLOBIN A1C LEVEL <7.0%: ICD-10-PCS | Mod: HCNC,CPTII,S$GLB, | Performed by: INTERNAL MEDICINE

## 2023-08-04 PROCEDURE — 3008F PR BODY MASS INDEX (BMI) DOCUMENTED: ICD-10-PCS | Mod: HCNC,CPTII,S$GLB, | Performed by: INTERNAL MEDICINE

## 2023-08-04 PROCEDURE — 3008F BODY MASS INDEX DOCD: CPT | Mod: HCNC,CPTII,S$GLB, | Performed by: INTERNAL MEDICINE

## 2023-08-04 PROCEDURE — 99214 OFFICE O/P EST MOD 30 MIN: CPT | Mod: HCNC,S$GLB,, | Performed by: INTERNAL MEDICINE

## 2023-08-04 PROCEDURE — 3072F LOW RISK FOR RETINOPATHY: CPT | Mod: HCNC,CPTII,S$GLB, | Performed by: INTERNAL MEDICINE

## 2023-08-04 PROCEDURE — 99499 UNLISTED E&M SERVICE: CPT | Mod: HCNC,S$GLB,, | Performed by: INTERNAL MEDICINE

## 2023-08-04 PROCEDURE — 1160F PR REVIEW ALL MEDS BY PRESCRIBER/CLIN PHARMACIST DOCUMENTED: ICD-10-PCS | Mod: HCNC,CPTII,S$GLB, | Performed by: INTERNAL MEDICINE

## 2023-08-04 PROCEDURE — 1160F RVW MEDS BY RX/DR IN RCRD: CPT | Mod: HCNC,CPTII,S$GLB, | Performed by: INTERNAL MEDICINE

## 2023-08-04 PROCEDURE — 1101F PR PT FALLS ASSESS DOC 0-1 FALLS W/OUT INJ PAST YR: ICD-10-PCS | Mod: HCNC,CPTII,S$GLB, | Performed by: INTERNAL MEDICINE

## 2023-08-04 PROCEDURE — 3075F PR MOST RECENT SYSTOLIC BLOOD PRESS GE 130-139MM HG: ICD-10-PCS | Mod: HCNC,CPTII,S$GLB, | Performed by: INTERNAL MEDICINE

## 2023-08-04 PROCEDURE — 1125F AMNT PAIN NOTED PAIN PRSNT: CPT | Mod: HCNC,CPTII,S$GLB, | Performed by: INTERNAL MEDICINE

## 2023-08-04 PROCEDURE — 1125F PR PAIN SEVERITY QUANTIFIED, PAIN PRESENT: ICD-10-PCS | Mod: HCNC,CPTII,S$GLB, | Performed by: INTERNAL MEDICINE

## 2023-08-05 NOTE — PROGRESS NOTES
On-call note. Called by lab Re: critical CO2. Creatinine has increased significantly since last check. Spoke with patient and she feels she is at her baseline. Advised ED due to critically low CO2 and ZAK. . She expresses understanding of recommendation. She has family member that can take her.

## 2023-08-06 ENCOUNTER — HOSPITAL ENCOUNTER (INPATIENT)
Facility: HOSPITAL | Age: 73
LOS: 5 days | Discharge: HOME HEALTH | DRG: 674 | End: 2023-08-11
Attending: EMERGENCY MEDICINE | Admitting: EMERGENCY MEDICINE
Payer: MEDICARE

## 2023-08-06 DIAGNOSIS — L24.A2 IRRITANT CONTACT DERMATITIS DUE TO FECAL, URINARY OR DUAL INCONTINENCE: ICD-10-CM

## 2023-08-06 DIAGNOSIS — R01.1 HOLOSYSTOLIC MURMUR: ICD-10-CM

## 2023-08-06 DIAGNOSIS — N18.9 ACUTE KIDNEY INJURY SUPERIMPOSED ON CKD: Primary | ICD-10-CM

## 2023-08-06 DIAGNOSIS — N17.9 ACUTE KIDNEY INJURY SUPERIMPOSED ON CKD: Primary | ICD-10-CM

## 2023-08-06 DIAGNOSIS — R53.1 WEAKNESS: ICD-10-CM

## 2023-08-06 PROBLEM — L89.301 PRESSURE INJURY OF BUTTOCK, STAGE 1: Status: ACTIVE | Noted: 2023-08-06

## 2023-08-06 PROBLEM — R82.71 BACTERIA IN URINE: Status: ACTIVE | Noted: 2023-08-06

## 2023-08-06 PROBLEM — N18.5 ACUTE RENAL FAILURE SUPERIMPOSED ON STAGE 5 CHRONIC KIDNEY DISEASE, NOT ON CHRONIC DIALYSIS: Status: ACTIVE | Noted: 2021-11-28

## 2023-08-06 LAB
ALBUMIN SERPL BCP-MCNC: 2.6 G/DL (ref 3.5–5.2)
ALLENS TEST: ABNORMAL
ALLENS TEST: ABNORMAL
ALP SERPL-CCNC: 113 U/L (ref 55–135)
ALT SERPL W/O P-5'-P-CCNC: 9 U/L (ref 10–44)
ANION GAP SERPL CALC-SCNC: 16 MMOL/L (ref 8–16)
AST SERPL-CCNC: 10 U/L (ref 10–40)
BACTERIA #/AREA URNS AUTO: ABNORMAL /HPF
BASOPHILS # BLD AUTO: 0.04 K/UL (ref 0–0.2)
BASOPHILS NFR BLD: 0.3 % (ref 0–1.9)
BILIRUB SERPL-MCNC: 0.2 MG/DL (ref 0.1–1)
BILIRUB UR QL STRIP: NEGATIVE
BNP SERPL-MCNC: 390 PG/ML (ref 0–99)
BUN SERPL-MCNC: 65 MG/DL (ref 8–23)
CALCIUM SERPL-MCNC: 8.2 MG/DL (ref 8.7–10.5)
CHLORIDE SERPL-SCNC: 110 MMOL/L (ref 95–110)
CLARITY UR REFRACT.AUTO: ABNORMAL
CO2 SERPL-SCNC: 7 MMOL/L (ref 23–29)
COLOR UR AUTO: YELLOW
CREAT SERPL-MCNC: 4.7 MG/DL (ref 0.5–1.4)
CREAT UR-MCNC: 28 MG/DL (ref 15–325)
DIFFERENTIAL METHOD: ABNORMAL
EOSINOPHIL # BLD AUTO: 0.2 K/UL (ref 0–0.5)
EOSINOPHIL NFR BLD: 1.4 % (ref 0–8)
ERYTHROCYTE [DISTWIDTH] IN BLOOD BY AUTOMATED COUNT: 15 % (ref 11.5–14.5)
EST. GFR  (NO RACE VARIABLE): 9.4 ML/MIN/1.73 M^2
GLUCOSE SERPL-MCNC: 153 MG/DL (ref 70–110)
GLUCOSE UR QL STRIP: NEGATIVE
HCO3 UR-SCNC: 9.5 MMOL/L (ref 24–28)
HCO3 UR-SCNC: 9.8 MMOL/L (ref 24–28)
HCT VFR BLD AUTO: 27.1 % (ref 37–48.5)
HCT VFR BLD CALC: 27 %PCV (ref 36–54)
HCV AB SERPL QL IA: NORMAL
HGB BLD-MCNC: 8.6 G/DL (ref 12–16)
HGB UR QL STRIP: ABNORMAL
HIV 1+2 AB+HIV1 P24 AG SERPL QL IA: NORMAL
HYALINE CASTS UR QL AUTO: 0 /LPF
IMM GRANULOCYTES # BLD AUTO: 0.13 K/UL (ref 0–0.04)
IMM GRANULOCYTES NFR BLD AUTO: 1 % (ref 0–0.5)
KETONES UR QL STRIP: NEGATIVE
LACTATE SERPL-SCNC: 0.8 MMOL/L (ref 0.5–2.2)
LEUKOCYTE ESTERASE UR QL STRIP: ABNORMAL
LYMPHOCYTES # BLD AUTO: 1.6 K/UL (ref 1–4.8)
LYMPHOCYTES NFR BLD: 12.4 % (ref 18–48)
MAGNESIUM SERPL-MCNC: 1.7 MG/DL (ref 1.6–2.6)
MCH RBC QN AUTO: 27.9 PG (ref 27–31)
MCHC RBC AUTO-ENTMCNC: 31.7 G/DL (ref 32–36)
MCV RBC AUTO: 88 FL (ref 82–98)
MICROSCOPIC COMMENT: ABNORMAL
MONOCYTES # BLD AUTO: 0.8 K/UL (ref 0.3–1)
MONOCYTES NFR BLD: 6.4 % (ref 4–15)
NEUTROPHILS # BLD AUTO: 10.3 K/UL (ref 1.8–7.7)
NEUTROPHILS NFR BLD: 78.5 % (ref 38–73)
NITRITE UR QL STRIP: NEGATIVE
NRBC BLD-RTO: 0 /100 WBC
PCO2 BLDA: 33.9 MMHG (ref 35–45)
PCO2 BLDA: 33.9 MMHG (ref 35–45)
PH SMN: 7.06 [PH] (ref 7.35–7.45)
PH SMN: 7.07 [PH] (ref 7.35–7.45)
PH UR STRIP: 7 [PH] (ref 5–8)
PHOSPHATE SERPL-MCNC: 7.4 MG/DL (ref 2.7–4.5)
PLATELET # BLD AUTO: 407 K/UL (ref 150–450)
PMV BLD AUTO: 9.1 FL (ref 9.2–12.9)
PO2 BLDA: 24 MMHG (ref 40–60)
PO2 BLDA: 28 MMHG (ref 40–60)
POC BE: -20 MMOL/L
POC BE: -21 MMOL/L
POC IONIZED CALCIUM: 1.23 MMOL/L (ref 1.06–1.42)
POC SATURATED O2: 25 % (ref 95–100)
POC SATURATED O2: 32 % (ref 95–100)
POC TCO2: 11 MMOL/L (ref 24–29)
POC TCO2: 11 MMOL/L (ref 24–29)
POCT GLUCOSE: 176 MG/DL (ref 70–110)
POTASSIUM BLD-SCNC: 3.5 MMOL/L (ref 3.5–5.1)
POTASSIUM SERPL-SCNC: 3.4 MMOL/L (ref 3.5–5.1)
PROT SERPL-MCNC: 6.3 G/DL (ref 6–8.4)
PROT UR QL STRIP: ABNORMAL
PROT UR-MCNC: 140 MG/DL (ref 0–15)
PROT/CREAT UR: 5 MG/G{CREAT} (ref 0–0.2)
RBC # BLD AUTO: 3.08 M/UL (ref 4–5.4)
RBC #/AREA URNS AUTO: 9 /HPF (ref 0–4)
SAMPLE: ABNORMAL
SAMPLE: ABNORMAL
SITE: ABNORMAL
SITE: ABNORMAL
SODIUM BLD-SCNC: 136 MMOL/L (ref 136–145)
SODIUM SERPL-SCNC: 133 MMOL/L (ref 136–145)
SODIUM UR-SCNC: 32 MMOL/L (ref 20–250)
SP GR UR STRIP: 1.01 (ref 1–1.03)
SQUAMOUS #/AREA URNS AUTO: 1 /HPF
TROPONIN I SERPL DL<=0.01 NG/ML-MCNC: 0.06 NG/ML (ref 0–0.03)
TROPONIN I SERPL DL<=0.01 NG/ML-MCNC: 0.07 NG/ML (ref 0–0.03)
TSH SERPL DL<=0.005 MIU/L-ACNC: 0.57 UIU/ML (ref 0.4–4)
URN SPEC COLLECT METH UR: ABNORMAL
WBC # BLD AUTO: 13.15 K/UL (ref 3.9–12.7)
WBC #/AREA URNS AUTO: >100 /HPF (ref 0–5)
WBC CLUMPS UR QL AUTO: ABNORMAL

## 2023-08-06 PROCEDURE — 87088 URINE BACTERIA CULTURE: CPT | Mod: HCNC

## 2023-08-06 PROCEDURE — 84484 ASSAY OF TROPONIN QUANT: CPT | Mod: HCNC | Performed by: EMERGENCY MEDICINE

## 2023-08-06 PROCEDURE — 83605 ASSAY OF LACTIC ACID: CPT | Mod: HCNC | Performed by: EMERGENCY MEDICINE

## 2023-08-06 PROCEDURE — 85025 COMPLETE CBC W/AUTO DIFF WBC: CPT | Mod: HCNC

## 2023-08-06 PROCEDURE — 99223 1ST HOSP IP/OBS HIGH 75: CPT | Mod: HCNC,GC,, | Performed by: HOSPITALIST

## 2023-08-06 PROCEDURE — 93005 ELECTROCARDIOGRAM TRACING: CPT | Mod: HCNC

## 2023-08-06 PROCEDURE — 82800 BLOOD PH: CPT | Mod: HCNC

## 2023-08-06 PROCEDURE — 84156 ASSAY OF PROTEIN URINE: CPT | Mod: HCNC

## 2023-08-06 PROCEDURE — 87186 SC STD MICRODIL/AGAR DIL: CPT | Mod: HCNC

## 2023-08-06 PROCEDURE — 25000003 PHARM REV CODE 250: Mod: HCNC | Performed by: STUDENT IN AN ORGANIZED HEALTH CARE EDUCATION/TRAINING PROGRAM

## 2023-08-06 PROCEDURE — 99223 PR INITIAL HOSPITAL CARE,LEVL III: ICD-10-PCS | Mod: HCNC,GC,, | Performed by: HOSPITALIST

## 2023-08-06 PROCEDURE — 86803 HEPATITIS C AB TEST: CPT | Mod: HCNC | Performed by: EMERGENCY MEDICINE

## 2023-08-06 PROCEDURE — 63600175 PHARM REV CODE 636 W HCPCS: Mod: HCNC

## 2023-08-06 PROCEDURE — 25000003 PHARM REV CODE 250: Mod: HCNC

## 2023-08-06 PROCEDURE — 82803 BLOOD GASES ANY COMBINATION: CPT | Mod: HCNC

## 2023-08-06 PROCEDURE — 12000002 HC ACUTE/MED SURGE SEMI-PRIVATE ROOM: Mod: HCNC

## 2023-08-06 PROCEDURE — 87086 URINE CULTURE/COLONY COUNT: CPT | Mod: HCNC

## 2023-08-06 PROCEDURE — 84443 ASSAY THYROID STIM HORMONE: CPT | Mod: HCNC | Performed by: EMERGENCY MEDICINE

## 2023-08-06 PROCEDURE — 93010 EKG 12-LEAD: ICD-10-PCS | Mod: HCNC,,, | Performed by: INTERNAL MEDICINE

## 2023-08-06 PROCEDURE — 83880 ASSAY OF NATRIURETIC PEPTIDE: CPT | Mod: HCNC | Performed by: EMERGENCY MEDICINE

## 2023-08-06 PROCEDURE — 87154 CUL TYP ID BLD PTHGN 6+ TRGT: CPT | Mod: HCNC

## 2023-08-06 PROCEDURE — 81001 URINALYSIS AUTO W/SCOPE: CPT | Mod: HCNC

## 2023-08-06 PROCEDURE — 82962 GLUCOSE BLOOD TEST: CPT | Mod: HCNC

## 2023-08-06 PROCEDURE — 93010 ELECTROCARDIOGRAM REPORT: CPT | Mod: HCNC,,, | Performed by: INTERNAL MEDICINE

## 2023-08-06 PROCEDURE — 83735 ASSAY OF MAGNESIUM: CPT | Mod: HCNC | Performed by: EMERGENCY MEDICINE

## 2023-08-06 PROCEDURE — 87389 HIV-1 AG W/HIV-1&-2 AB AG IA: CPT | Mod: HCNC | Performed by: EMERGENCY MEDICINE

## 2023-08-06 PROCEDURE — 84300 ASSAY OF URINE SODIUM: CPT | Mod: HCNC

## 2023-08-06 PROCEDURE — 63600175 PHARM REV CODE 636 W HCPCS: Mod: HCNC | Performed by: EMERGENCY MEDICINE

## 2023-08-06 PROCEDURE — 99900035 HC TECH TIME PER 15 MIN (STAT): Mod: HCNC

## 2023-08-06 PROCEDURE — 84100 ASSAY OF PHOSPHORUS: CPT | Mod: HCNC | Performed by: EMERGENCY MEDICINE

## 2023-08-06 PROCEDURE — 80053 COMPREHEN METABOLIC PANEL: CPT | Mod: HCNC

## 2023-08-06 PROCEDURE — 96375 TX/PRO/DX INJ NEW DRUG ADDON: CPT | Mod: HCNC

## 2023-08-06 PROCEDURE — 87040 BLOOD CULTURE FOR BACTERIA: CPT | Mod: HCNC | Performed by: EMERGENCY MEDICINE

## 2023-08-06 PROCEDURE — 84484 ASSAY OF TROPONIN QUANT: CPT | Mod: 91,HCNC

## 2023-08-06 PROCEDURE — 87077 CULTURE AEROBIC IDENTIFY: CPT | Mod: 59,HCNC

## 2023-08-06 PROCEDURE — 99285 EMERGENCY DEPT VISIT HI MDM: CPT | Mod: 25,HCNC

## 2023-08-06 PROCEDURE — 96365 THER/PROPH/DIAG IV INF INIT: CPT | Mod: HCNC

## 2023-08-06 PROCEDURE — 96367 TX/PROPH/DG ADDL SEQ IV INF: CPT | Mod: HCNC

## 2023-08-06 PROCEDURE — 25000003 PHARM REV CODE 250: Mod: HCNC | Performed by: EMERGENCY MEDICINE

## 2023-08-06 RX ORDER — SODIUM BICARBONATE 650 MG/1
1300 TABLET ORAL 2 TIMES DAILY
Status: DISCONTINUED | OUTPATIENT
Start: 2023-08-06 | End: 2023-08-06

## 2023-08-06 RX ORDER — SUMATRIPTAN 50 MG/1
50 TABLET, FILM COATED ORAL 2 TIMES DAILY PRN
Status: DISCONTINUED | OUTPATIENT
Start: 2023-08-06 | End: 2023-08-07

## 2023-08-06 RX ORDER — HYDRALAZINE HYDROCHLORIDE 20 MG/ML
10 INJECTION INTRAMUSCULAR; INTRAVENOUS EVERY 8 HOURS PRN
Status: DISCONTINUED | OUTPATIENT
Start: 2023-08-06 | End: 2023-08-08

## 2023-08-06 RX ORDER — METHOCARBAMOL 500 MG/1
1000 TABLET, FILM COATED ORAL 3 TIMES DAILY
Status: DISCONTINUED | OUTPATIENT
Start: 2023-08-06 | End: 2023-08-12 | Stop reason: HOSPADM

## 2023-08-06 RX ORDER — GLUCAGON 1 MG
1 KIT INJECTION
Status: DISCONTINUED | OUTPATIENT
Start: 2023-08-06 | End: 2023-08-12 | Stop reason: HOSPADM

## 2023-08-06 RX ORDER — DICLOFENAC SODIUM 10 MG/G
2 GEL TOPICAL DAILY
Status: DISCONTINUED | OUTPATIENT
Start: 2023-08-07 | End: 2023-08-06

## 2023-08-06 RX ORDER — LEVOTHYROXINE SODIUM 50 UG/1
50 TABLET ORAL
Status: DISCONTINUED | OUTPATIENT
Start: 2023-08-07 | End: 2023-08-12 | Stop reason: HOSPADM

## 2023-08-06 RX ORDER — SODIUM BICARBONATE 650 MG/1
1950 TABLET ORAL 3 TIMES DAILY
Status: DISCONTINUED | OUTPATIENT
Start: 2023-08-07 | End: 2023-08-07

## 2023-08-06 RX ORDER — METHOCARBAMOL 750 MG/1
750 TABLET, FILM COATED ORAL 3 TIMES DAILY
Status: DISCONTINUED | OUTPATIENT
Start: 2023-08-06 | End: 2023-08-06

## 2023-08-06 RX ORDER — FUROSEMIDE 10 MG/ML
80 INJECTION INTRAMUSCULAR; INTRAVENOUS ONCE
Status: COMPLETED | OUTPATIENT
Start: 2023-08-06 | End: 2023-08-06

## 2023-08-06 RX ORDER — SEVELAMER CARBONATE 800 MG/1
800 TABLET, FILM COATED ORAL
Status: DISCONTINUED | OUTPATIENT
Start: 2023-08-06 | End: 2023-08-07

## 2023-08-06 RX ORDER — SODIUM BICARBONATE 650 MG/1
1950 TABLET ORAL ONCE
Status: COMPLETED | OUTPATIENT
Start: 2023-08-06 | End: 2023-08-06

## 2023-08-06 RX ORDER — HYDROCODONE BITARTRATE AND ACETAMINOPHEN 10; 325 MG/1; MG/1
1 TABLET ORAL EVERY 6 HOURS PRN
Status: DISCONTINUED | OUTPATIENT
Start: 2023-08-06 | End: 2023-08-12 | Stop reason: HOSPADM

## 2023-08-06 RX ORDER — IBUPROFEN 200 MG
24 TABLET ORAL
Status: DISCONTINUED | OUTPATIENT
Start: 2023-08-06 | End: 2023-08-12 | Stop reason: HOSPADM

## 2023-08-06 RX ORDER — ANASTROZOLE 1 MG/1
1 TABLET ORAL DAILY
Status: DISCONTINUED | OUTPATIENT
Start: 2023-08-07 | End: 2023-08-12 | Stop reason: HOSPADM

## 2023-08-06 RX ORDER — ERGOCALCIFEROL 1.25 MG/1
50000 CAPSULE ORAL
Status: DISCONTINUED | OUTPATIENT
Start: 2023-08-08 | End: 2023-08-12 | Stop reason: HOSPADM

## 2023-08-06 RX ORDER — SODIUM BICARBONATE 650 MG/1
650 TABLET ORAL 2 TIMES DAILY
Status: DISCONTINUED | OUTPATIENT
Start: 2023-08-06 | End: 2023-08-06

## 2023-08-06 RX ORDER — ONDANSETRON 2 MG/ML
4 INJECTION INTRAMUSCULAR; INTRAVENOUS ONCE
Status: COMPLETED | OUTPATIENT
Start: 2023-08-06 | End: 2023-08-06

## 2023-08-06 RX ORDER — IBUPROFEN 200 MG
16 TABLET ORAL
Status: DISCONTINUED | OUTPATIENT
Start: 2023-08-06 | End: 2023-08-12 | Stop reason: HOSPADM

## 2023-08-06 RX ORDER — CLONIDINE HYDROCHLORIDE 0.1 MG/1
0.1 TABLET ORAL 3 TIMES DAILY PRN
Status: DISCONTINUED | OUTPATIENT
Start: 2023-08-06 | End: 2023-08-06

## 2023-08-06 RX ORDER — ONDANSETRON 8 MG/1
8 TABLET, ORALLY DISINTEGRATING ORAL EVERY 12 HOURS PRN
Status: DISCONTINUED | OUTPATIENT
Start: 2023-08-06 | End: 2023-08-12 | Stop reason: HOSPADM

## 2023-08-06 RX ORDER — TRAZODONE HYDROCHLORIDE 50 MG/1
100 TABLET ORAL NIGHTLY
Status: DISCONTINUED | OUTPATIENT
Start: 2023-08-06 | End: 2023-08-12 | Stop reason: HOSPADM

## 2023-08-06 RX ORDER — ATORVASTATIN CALCIUM 20 MG/1
80 TABLET, FILM COATED ORAL DAILY
Status: DISCONTINUED | OUTPATIENT
Start: 2023-08-07 | End: 2023-08-12 | Stop reason: HOSPADM

## 2023-08-06 RX ORDER — OXYBUTYNIN CHLORIDE 5 MG/1
10 TABLET, EXTENDED RELEASE ORAL DAILY
Status: DISCONTINUED | OUTPATIENT
Start: 2023-08-07 | End: 2023-08-12 | Stop reason: HOSPADM

## 2023-08-06 RX ADMIN — SODIUM BICARBONATE 650 MG TABLET 1950 MG: at 09:08

## 2023-08-06 RX ADMIN — HYDROCODONE BITARTRATE AND ACETAMINOPHEN 1 TABLET: 10; 325 TABLET ORAL at 07:08

## 2023-08-06 RX ADMIN — PIPERACILLIN SODIUM AND TAZOBACTAM SODIUM 4.5 G: 4; .5 INJECTION, POWDER, FOR SOLUTION INTRAVENOUS at 06:08

## 2023-08-06 RX ADMIN — FUROSEMIDE 80 MG: 10 INJECTION, SOLUTION INTRAVENOUS at 09:08

## 2023-08-06 RX ADMIN — INSULIN DETEMIR 8 UNITS: 100 INJECTION, SOLUTION SUBCUTANEOUS at 09:08

## 2023-08-06 RX ADMIN — SODIUM BICARBONATE 650 MG TABLET 1300 MG: at 08:08

## 2023-08-06 RX ADMIN — ONDANSETRON 8 MG: 8 TABLET, ORALLY DISINTEGRATING ORAL at 07:08

## 2023-08-06 RX ADMIN — SEVELAMER CARBONATE 800 MG: 800 TABLET, FILM COATED ORAL at 09:08

## 2023-08-06 RX ADMIN — TRAZODONE HYDROCHLORIDE 100 MG: 50 TABLET ORAL at 08:08

## 2023-08-06 RX ADMIN — VANCOMYCIN HYDROCHLORIDE 2000 MG: 10 INJECTION, POWDER, LYOPHILIZED, FOR SOLUTION INTRAVENOUS at 07:08

## 2023-08-06 RX ADMIN — SODIUM CHLORIDE 1000 ML: 9 INJECTION, SOLUTION INTRAVENOUS at 05:08

## 2023-08-06 RX ADMIN — ONDANSETRON 4 MG: 2 INJECTION INTRAMUSCULAR; INTRAVENOUS at 05:08

## 2023-08-06 RX ADMIN — METHOCARBAMOL 750 MG: 750 TABLET, FILM COATED ORAL at 08:08

## 2023-08-06 RX ADMIN — APIXABAN 2.5 MG: 2.5 TABLET, FILM COATED ORAL at 08:08

## 2023-08-06 RX ADMIN — HYDRALAZINE HYDROCHLORIDE 10 MG: 20 INJECTION, SOLUTION INTRAMUSCULAR; INTRAVENOUS at 07:08

## 2023-08-06 NOTE — HPI
"Cecile Bowen is 73 y.o. with T2DM on insulin, Afib on Eliquis, neurogenic bladder with suprapubic catheter, CKD4 previously on dialysis, HLD, hypothyroidism, migraine, chronic pain who presents to [facility] for critical labs from her PCP visit. PCP instructed pt on Friday 8/04 to present to ED for bicarb less than 5 and ZAK. She felt like she was fine and waited till Sunday 08/06 to present to ED. She reports "feeling low physically" and that's what prompted her to come in. Reports good hydration but poor oral nutrition; not much food in the house. She has a suprapubic catheter that was placed 7 years ago, and was recently changed 2 wks ago, per pt. Denies smoking use or hx, alcohol use or other use of illict drugs. Reports chronic constipation, chronic back pain, intermittent productive cough, weakness, chronic bilateral floaters and lightheadedness. She is wheelchair bound because she reports no muscle tone in legs. Reports urinating well. Cannot account for dysuria as pt has catheter in place, but does report recently foul smelling urine. Denies CP, SOB, unexpected weight loss, fever, abdominal pain, diarrhea. Denies any recent changes to medications or any sick contacts. Denies recent falls; last fall was one year w/o head trauma.     Was previously on dialysis for about 6 months about a year ago, but was told "her kidneys woke up" so she was able to come off of it.  Reports that care is in the hands of her sister and feels that she does not receive much care.  She also reports not being very hungry usually because she tends to get nauseated by food.  See CM/SW and nursing notes for more info on housing situation.     "

## 2023-08-06 NOTE — Clinical Note
Diagnosis: Weakness [346786]   Admitting Provider:: LUCIAN STANTON [[id number]]   Future Attending Provider: TONY LINN [[id number]]   Reason for IP Medical Treatment  (Clinical interventions that can only be accomplished in the IP setting? ) :: metabolic acidosis; darren   I certify that Inpatient services for greater than or equal to 2 midnights are medically necessary:: Yes   Plans for Post-Acute care--if anticipated (pick the single best option):: A. No post acute care anticipated at this time

## 2023-08-06 NOTE — SUBJECTIVE & OBJECTIVE
Past Medical History:   Diagnosis Date    Cervicogenic migraine     Chronic pain     CKD (chronic kidney disease) stage 4, GFR 15-29 ml/min     Dr Piedadmoody    CKD (chronic kidney disease) stage 4, GFR 15-29 ml/min     Diabetes mellitus     Long term use of Insulin, Diabetic Neuropathy    Dialysis patient 1/21/2022    Fibromyalgia     Hydronephrosis     Hyperlipidemia     Hypertension 12/12/2012    Hypothyroidism 12/12/2012    ARON (iron deficiency anemia)     Insomnia     Levoscoliosis     Malignant neoplasm of upper-outer quadrant of left breast in female, estrogen receptor positive     Metabolic acidosis     Mobility impaired     Nuclear sclerosis - Both Eyes 3/24/2014    VIJAYA (obstructive sleep apnea)     Osteopenia     Pulmonary nodule     Recurrent UTI     Renal manifestation of secondary diabetes mellitus     Secondary hyperparathyroidism, renal     Urinary retention        Past Surgical History:   Procedure Laterality Date    BIOPSY OF URETER Left 2/18/2022    Procedure: BIOPSY, URETER;  Surgeon: Ronel Whittington MD;  [facility];  Service: Urology;  Laterality: Left;    BREAST BIOPSY Right     benign    CHOLECYSTECTOMY      COLONOSCOPY N/A 1/13/2017    Procedure: COLONOSCOPY;  Surgeon: Morris Wiseman MD;  Location: [facility] (4TH FLR);  Service: Endoscopy;  Laterality: N/A;  Renal pt Nephrology advised to avoid phosphate preps    CYSTOSCOPY N/A 10/8/2018    Procedure: CYSTOSCOPY;  Surgeon: Ronel Whittington MD;  [facility];  Service: Urology;  Laterality: N/A;  45 min    CYSTOSCOPY N/A 3/25/2019    Procedure: CYSTOSCOPY;  Surgeon: Ronel Whittington MD;  [facility];  Service: Urology;  Laterality: N/A;  45 min    CYSTOSCOPY N/A 8/26/2019    Procedure: CYSTOSCOPY;  Surgeon: Ronel Whittington MD;  [facility];  Service: Urology;  Laterality: N/A;  45 min    CYSTOSCOPY N/A 7/2/2021    Procedure: CYSTOSCOPY;  Surgeon: Ronel FRAZIER  MD Pk;  [facility];  Service: Urology;  Laterality: N/A;    CYSTOSCOPY  12/23/2021    Procedure: CYSTOSCOPY;  Surgeon: Ronel Whittington MD;  [facility];  Service: Urology;;    CYSTOSCOPY  2/18/2022    Procedure: CYSTOSCOPY;  Surgeon: Ronel Whittington MD;  [facility];  Service: Urology;;    CYSTOSCOPY W/ URETERAL STENT PLACEMENT Left 5/15/2021    Procedure: CYSTOSCOPY, WITH URETERAL STENT INSERTION;  Surgeon: Levy Sánchez Jr., MD;  [facility];  Service: Urology;  Laterality: Left;    CYSTOSCOPY WITH BIOPSY OF BLADDER N/A 1/27/2020    Procedure: CYSTOSCOPY, WITH BLADDER BIOPSY, WITH FULGURATION IF INDICATED;  Surgeon: Ronel Whittington MD;  Location: [facility] OR [address] [place] [address];  Service: Urology;  Laterality: N/A;    DILATION AND CURETTAGE OF UTERUS      GALLBLADDER SURGERY  2006    HYSTERECTOMY      INJECTION FOR SENTINEL NODE IDENTIFICATION Left 8/1/2019    Procedure: INJECTION, FOR SENTINEL NODE IDENTIFICATION;  Surgeon: Samia Fulton MD;  Location: [facility] OR [address] [place] [address];  Service: General;  Laterality: Left;    INJECTION OF BOTULINUM TOXIN TYPE A N/A 10/8/2018    Procedure: INJECTION, BOTULINUM TOXIN, TYPE A 300 UNITS;  Surgeon: Ronel Whittington MD;  Location: [facility] OR [address] [place] [address];  Service: Urology;  Laterality: N/A;    INJECTION OF BOTULINUM TOXIN TYPE A N/A 3/25/2019    Procedure: INJECTION, BOTULINUM TOXIN, TYPE A 300 UNITS;  Surgeon: Ronel Whittington MD;  Location: [facility] OR [address] [place] [address];  Service: Urology;  Laterality: N/A;    INJECTION OF BOTULINUM TOXIN TYPE A N/A 8/26/2019    Procedure: INJECTION, BOTULINUM TOXIN, TYPE A 300 UNITS;  Surgeon: Ronel Whittington MD;  [facility];  Service: Urology;  Laterality: N/A;    MASTECTOMY Left 8/1/2019    Procedure: MASTECTOMY 23 hour stay;  Surgeon: aSmia Fulton MD;  Location: [facility] OR 2ND FLR;  Service: General;  Laterality: Left;    MEDIPORT REMOVAL Right 6/24/2022     "Procedure: REMOVAL, CATHETER, CENTRAL VENOUS, TUNNELED, WITH PORT;  Surgeon: Isauro Anderson MD;  Location: [facility] CATH LAB;  Service: Radiology;  Laterality: Right;    OVARIAN CYST SURGERY  1985    REPLACEMENT OF STENT Left 12/23/2021    Procedure: REPLACEMENT, STENT;  Surgeon: Ronel Whittington MD;  [facility];  Service: Urology;  Laterality: Left;    REPLACEMENT OF STENT Left 2/18/2022    Procedure: REPLACEMENT, STENT;  Surgeon: Ronel Whittington MD;  Location: [facility] OR 1ST FLR;  Service: Urology;  Laterality: Left;    RETROGRADE PYELOGRAPHY Left 2/18/2022    Procedure: PYELOGRAM, RETROGRADE;  Surgeon: Ronel Whittington MD;  [facility];  Service: Urology;  Laterality: Left;    SENTINEL LYMPH NODE BIOPSY Left 8/1/2019    Procedure: BIOPSY, LYMPH NODE, SENTINEL;  Surgeon: Samia Fulton MD;  [facility];  Service: General;  Laterality: Left;    spt placement      TONSILLECTOMY, ADENOIDECTOMY      TOTAL ABDOMINAL HYSTERECTOMY W/ BILATERAL SALPINGOOPHORECTOMY  1985    URETEROSCOPY Left 2/18/2022    Procedure: URETEROSCOPY;  Surgeon: Ronel Whittington MD;  [facility];  Service: Urology;  Laterality: Left;       Review of patient's allergies indicates:  No Known Allergies    No current facility-administered medications on file prior to encounter.     Current Outpatient Medications on File Prior to Encounter   Medication Sig    anastrozole (ARIMIDEX) 1 mg Tab Take 1 tablet (1 mg total) by mouth once daily.    apixaban (ELIQUIS) 2.5 mg Tab Take 2.5 mg by mouth 2 (two) times daily.    atorvastatin (LIPITOR) 80 MG tablet Take 1 tablet (80 mg total) by mouth once daily.    BD INSULIN SYRINGE ULTRA-FINE 0.5 mL 31 gauge x 5/16" Syrg USE WITH INSULIN 4 TIMES A DAY    cloNIDine (CATAPRES) 0.1 MG tablet TAKE 2 TABLETS (0.2 MG TOTAL) BY MOUTH 3 (THREE) TIMES DAILY.    diclofenac sodium (VOLTAREN) 1 % Gel Apply to chest wall as needed for arthritis    " "DULoxetine (CYMBALTA) 20 MG capsule TAKE 2 CAPSULES BY MOUTH EVERY DAY    erenumab-aooe (AIMOVIG) 140 mg/mL autoinjector Inject 1 mL (140 mg total) into the skin every 30 days.    ergocalciferol (ERGOCALCIFEROL) 50,000 unit Cap TAKE ONE CAPSULE BY MOUTH ONE TIME PER WEEK, TAKES ON TUESDAYS    HYDROcodone-acetaminophen (NORCO)  mg per tablet Take 1 tablet by mouth every 6 (six) hours as needed for Pain.    insulin (LANTUS SOLOSTAR U-100 INSULIN) glargine 100 units/mL SubQ pen INJECT 14-15 UNITS UNDER THE SKIN ONCE DAILY    methocarbamoL (ROBAXIN) 750 MG Tab TAKE 1-2 TABLETS (750-1,500 MG TOTAL) BY MOUTH 3 (THREE) TIMES DAILY AS NEEDED (MUSCLE SPASMS).    ondansetron (ZOFRAN-ODT) 8 MG TbDL TAKE 1 TABLET BY MOUTH EVERY 12 HOURS AS NEEDED    oxybutynin (DITROPAN-XL) 10 MG 24 hr tablet Take 1 tablet (10 mg total) by mouth once daily.    pen needle, diabetic (BD ULTRA-FINE SHORT PEN NEEDLE) 31 gauge x 5/16" Ndle USE WITH LANTUS DAILY, e 11.65    sodium bicarbonate 650 MG tablet Take 1 tablet (650 mg total) by mouth 3 (three) times daily.    sumatriptan (IMITREX) 100 MG tablet Take 1 tablet (100 mg total) by mouth 2 (two) times daily as needed for Migraine. MUST MAKE APPOINTMENT FOR FURTHER REFILLS    SYNTHROID 50 mcg tablet TAKE 1 TABLET BY MOUTH BEFORE BREAKFAST. ADMINISTER ON AN EMPTY STOMACH AT LEAST 30 MINUTES BEFORE FOOD. IF RECEIVING TUBE FEEDS, HOLD TUBE FEEDS FOR 1 HOUR BEFORE AND AFTER LEVOTHYROXINE ADMINISTRATION.    traZODone (DESYREL) 100 MG tablet TAKE 1 TABLET BY MOUTH NIGHTLY AS NEEDED FOR INSOMNIA.     Family History       Problem Relation (Age of Onset)    ALS Mother    Cancer Maternal Grandmother, Paternal Grandfather, Brother    Diabetes Sister, Maternal Aunt, Maternal Uncle    Kidney disease Sister, Maternal Aunt    Prostate cancer Brother    Scoliosis Brother          Tobacco Use    Smoking status: Never    Smokeless tobacco: Never   Substance and Sexual Activity    Alcohol use: No     " Alcohol/week: 0.0 standard drinks of alcohol    Drug use: No    Sexual activity: Not Currently     Birth control/protection: Abstinence     Review of Systems  Objective:     Vital Signs (Most Recent):  Temp: 98.8 °F (37.1 °C) (08/06/23 1802)  Pulse: 80 (08/06/23 1802)  Resp: 17 (08/06/23 1802)  BP: (!) 200/85 (08/06/23 1802)  SpO2: 100 % (08/06/23 1802) Vital Signs (24h Range):  Temp:  [98.8 °F (37.1 °C)] 98.8 °F (37.1 °C)  Pulse:  [79-86] 80  Resp:  [15-18] 17  SpO2:  [97 %-100 %] 100 %  BP: (160-200)/(84-87) 200/85     Weight: 122.5 kg (270 lb)  Body mass index is 41.05 kg/m².     Physical Exam  Vitals and nursing note reviewed.   Constitutional:       General: She is not in acute distress.     Appearance: Normal appearance.   Cardiovascular:      Rate and Rhythm: Normal rate and regular rhythm.      Pulses: Normal pulses.      Heart sounds: Murmur (holosystolic murmur) heard.      Comments: Unable to feel distal pulses due to volume overload status. Not able to appreciate JVD  Pulmonary:      Effort: Pulmonary effort is normal.      Breath sounds: Normal breath sounds.   Abdominal:      General: Bowel sounds are normal.      Palpations: Abdomen is soft.      Tenderness: There is no abdominal tenderness. There is left CVA tenderness. There is no right CVA tenderness.      Comments: Suprapubic catheter in place   Musculoskeletal:      Right lower leg: Edema present.      Left lower leg: Edema present.      Comments: Pitting edema bilaterally upto hip   Skin:     General: Skin is warm.      Coloration: Skin is pale.   Neurological:      General: No focal deficit present.      Mental Status: She is alert. Mental status is at baseline.   Psychiatric:         Mood and Affect: Mood normal.         Behavior: Behavior normal.                Significant Labs: All pertinent labs within the past 24 hours have been reviewed.    Significant Imaging: I have reviewed all pertinent imaging results/findings within the past 24  hours.

## 2023-08-06 NOTE — ED PROVIDER NOTES
Encounter Date: 8/6/2023       History     Chief Complaint   Patient presents with    Abnormal lab result from PCP     Her primary called her and told her to go ER due to elevated creatinine.      This is a 73-year-old female who has multiple medical comorbidities including history of breast cancer, AFib, diabetes, hyponatremia, hyperlipidemia, hypothyroidism, and history of frequent urinary tract infections with multidrug resistant organisms.  She presents today as she was called to come in due to some abnormal labs that were found on outpatient blood draw including an increase in her creatinine to 4.8 up from 2.8 back in January and for a bicarb of less than 5.  External sources of information:  Records external to today's visit.  The patient states that she is living in a house with her sister but stays primarily bed-bound and does not get up very frequently.  She has significant bed sores on her sacrum and buttocks.  She has not been eating and drinking well per her report.  She feels nauseated.  When she arrived here she vomited once and it was nonbloody and nonbilious.  She has no actual pain anywhere in her chest or her abdomen.  She does not feel short of breath.  She simply feels fatigued and nauseated.  She has not been running any fevers to her knowledge.    Review of patient's allergies indicates:  No Known Allergies  Past Medical History:   Diagnosis Date    Cervicogenic migraine     Chronic pain     CKD (chronic kidney disease) stage 4, GFR 15-29 ml/min     [facility]    CKD (chronic kidney disease) stage 4, GFR 15-29 ml/min     Diabetes mellitus     Long term use of Insulin, Diabetic Neuropathy    Dialysis patient 1/21/2022    Fibromyalgia     Hydronephrosis     Hyperlipidemia     Hypertension 12/12/2012    Hypothyroidism 12/12/2012    ARON (iron deficiency anemia)     Insomnia     Levoscoliosis     Malignant neoplasm of upper-outer quadrant of left breast in female, estrogen receptor  positive     Metabolic acidosis     Mobility impaired     Nuclear sclerosis - Both Eyes 3/24/2014    VIJAYA (obstructive sleep apnea)     Osteopenia     Pulmonary nodule     Recurrent UTI     Renal manifestation of secondary diabetes mellitus     Secondary hyperparathyroidism, renal     Urinary retention      Past Surgical History:   Procedure Laterality Date    BIOPSY OF URETER Left 2/18/2022    Procedure: BIOPSY, URETER;  Surgeon: Ronel Whittington MD;  Location: [facility] OR 1ST FLR;  Service: Urology;  Laterality: Left;    BREAST BIOPSY Right     benign    CHOLECYSTECTOMY      COLONOSCOPY N/A 1/13/2017    Procedure: COLONOSCOPY;  Surgeon: Morris Wiseman MD;  Location: [facility] ENDO (4TH FLR);  Service: Endoscopy;  Laterality: N/A;  Renal pt Nephrology advised to avoid phosphate preps    CYSTOSCOPY N/A 10/8/2018    Procedure: CYSTOSCOPY;  Surgeon: Ronel Whittington MD;  Location: [facility] OR [facility] FLR;  Service: Urology;  Laterality: N/A;  45 min    CYSTOSCOPY N/A 3/25/2019    Procedure: CYSTOSCOPY;  Surgeon: Ronel Whittington MD;  Location: [facility] OR 1ST FLR;  Service: Urology;  Laterality: N/A;  45 min    CYSTOSCOPY N/A 8/26/2019    Procedure: CYSTOSCOPY;  Surgeon: Ronel Whittington MD;  [facility];  Service: Urology;  Laterality: N/A;  45 min    CYSTOSCOPY N/A 7/2/2021    Procedure: CYSTOSCOPY;  Surgeon: Ronel Whittington MD;  [facility];  Service: Urology;  Laterality: N/A;    CYSTOSCOPY  12/23/2021    Procedure: CYSTOSCOPY;  Surgeon: Ronel Whittington MD;  [facility];  Service: Urology;;    CYSTOSCOPY  2/18/2022    Procedure: CYSTOSCOPY;  Surgeon: Ronel Whittington MD;  [facility];  Service: Urology;;    CYSTOSCOPY W/ URETERAL STENT PLACEMENT Left 5/15/2021    Procedure: CYSTOSCOPY, WITH URETERAL STENT INSERTION;  Surgeon: Levy Sánchez Jr., MD;  Location: [facility] OR [facility] FLR;  Service: Urology;  Laterality:  Left;    CYSTOSCOPY WITH BIOPSY OF BLADDER N/A 1/27/2020    Procedure: CYSTOSCOPY, WITH BLADDER BIOPSY, WITH FULGURATION IF INDICATED;  Surgeon: Ronel Whittington MD;  Location: [facility] OR [address] [place] [address];  Service: Urology;  Laterality: N/A;    DILATION AND CURETTAGE OF UTERUS      GALLBLADDER SURGERY  2006    HYSTERECTOMY      INJECTION FOR SENTINEL NODE IDENTIFICATION Left 8/1/2019    Procedure: INJECTION, FOR SENTINEL NODE IDENTIFICATION;  Surgeon: Samia Fulton MD;  Location: [facility] OR [address] [place] [address];  Service: General;  Laterality: Left;    INJECTION OF BOTULINUM TOXIN TYPE A N/A 10/8/2018    Procedure: INJECTION, BOTULINUM TOXIN, TYPE A 300 UNITS;  Surgeon: Ronel Whittington MD;  Location: [facility] OR [address] [place] [address];  Service: Urology;  Laterality: N/A;    INJECTION OF BOTULINUM TOXIN TYPE A N/A 3/25/2019    Procedure: INJECTION, BOTULINUM TOXIN, TYPE A 300 UNITS;  Surgeon: Ronel Whittington MD;  Location: [address];  Service: Urology;  Laterality: N/A;    INJECTION OF BOTULINUM TOXIN TYPE A N/A 8/26/2019    Procedure: INJECTION, BOTULINUM TOXIN, TYPE A 300 UNITS;  Surgeon: Ronel Whittington MD;  Location: [facility] OR [address] [place] [address];  Service: Urology;  Laterality: N/A;    MASTECTOMY Left 8/1/2019    Procedure: MASTECTOMY 23 hour stay;  Surgeon: Samia Fulton MD;  Location: [facility] OR [address] [place] [address];  Service: General;  Laterality: Left;    MEDIPORT REMOVAL Right 6/24/2022    Procedure: REMOVAL, CATHETER, CENTRAL VENOUS, TUNNELED, WITH PORT;  Surgeon: Isauro Anderson MD;  Location: [facility] CATH LAB;  Service: Radiology;  Laterality: Right;    OVARIAN CYST SURGERY  1985    REPLACEMENT OF STENT Left 12/23/2021    Procedure: REPLACEMENT, STENT;  Surgeon: Ronel Whittington MD;  Location: [address];  Service: Urology;  Laterality: Left;    REPLACEMENT OF STENT Left 2/18/2022    Procedure: REPLACEMENT, STENT;  Surgeon: Ronel Whittington MD;  Location: [facility] OR [address] [place] [address];  Service: Urology;  Laterality: Left;     RETROGRADE PYELOGRAPHY Left 2/18/2022    Procedure: PYELOGRAM, RETROGRADE;  Surgeon: Ronel Whittington MD;  Location: [facility] OR 1ST [facility];  Service: Urology;  Laterality: Left;    SENTINEL LYMPH NODE BIOPSY Left 8/1/2019    Procedure: BIOPSY, LYMPH NODE, SENTINEL;  Surgeon: Samia Fulton MD;  Location: [facility] OR 2ND FLR;  Service: General;  Laterality: Left;    spt placement      TONSILLECTOMY, ADENOIDECTOMY      TOTAL ABDOMINAL HYSTERECTOMY W/ BILATERAL SALPINGOOPHORECTOMY  1985    URETEROSCOPY Left 2/18/2022    Procedure: URETEROSCOPY;  Surgeon: Ronel Whittington MD;  [facility];  Service: Urology;  Laterality: Left;     Family History   Problem Relation Age of Onset    Diabetes Sister     Kidney disease Sister         CKD III    ALS Mother         d.    Cancer Maternal Grandmother         d. colon    Cancer Paternal Grandfather         d. lung    Scoliosis Brother         increased pain    Prostate cancer Brother         cured s/p surgery    Cancer Brother         rare cancer that got into the bones    Diabetes Maternal Aunt     Kidney disease Maternal Aunt     Diabetes Maternal Uncle     Amblyopia Neg Hx     Blindness Neg Hx     Cataracts Neg Hx     Glaucoma Neg Hx     Macular degeneration Neg Hx     Retinal detachment Neg Hx     Strabismus Neg Hx      Social History     Tobacco Use    Smoking status: Never    Smokeless tobacco: Never   Substance Use Topics    Alcohol use: No     Alcohol/week: 0.0 standard drinks of alcohol    Drug use: No     Review of Systems   Constitutional:  Negative for chills and fever.   HENT:  Negative for sore throat.    Eyes:  Negative for visual disturbance.   Respiratory:  Negative for cough and shortness of breath.    Cardiovascular:  Negative for chest pain.   Gastrointestinal:  Positive for nausea and vomiting. Negative for abdominal pain and diarrhea.   Genitourinary:  Negative for dysuria.   Musculoskeletal:  Negative for back  pain.   Skin:  Negative for rash.   Neurological:  Positive for weakness.   Hematological:  Does not bruise/bleed easily.   Psychiatric/Behavioral:  Negative for confusion.        Physical Exam     Initial Vitals [08/06/23 1321]   BP Pulse Resp Temp SpO2   (!) 160/87 86 18 98.8 °F (37.1 °C) 100 %      MAP       --         Physical Exam    Consititutional: Pt is awake, alert, and oriented x 4.  Appears chronically ill and bed-bound and fatigued.  HEENT: PERRL; EOMI; nares patent; op clear; mm dry.  Neck: Supple with good ROM.  CV: Normal rate; regular rhythm; no mrg. Heart sounds normal.  Bilateral peripheral edema to the lower extremities that is pitting from the knees down.  It is 1+.  2+ radials bilateral and symmetric.  Respiratory: CTA bilaterally with no focal rales, ronchi, or wheezes.  GI: Abdomen soft, NTND. No rebound. No guarding. BS normal.  Benign abdominal exam.  MSK:  Foot drop bilaterally; no focal joint swellings.  Neuro:  The patient's GCS is 15.  Her cranial nerves 2-12 are intact.  She is normal motor function to her upper extremities.  However, her lower extremities have very weak proximal lower extremity strength that I would rate as a 4+ out of 5 and distally more as a 5- out of 5.  This is symmetric.  Skin:  Significant skin breakdown to the lower back as denoted in the photo.      ED Course   Procedures  Labs Reviewed   CBC W/ AUTO DIFFERENTIAL - Abnormal; Notable for the following components:       Result Value    WBC 13.15 (*)     RBC 3.08 (*)     Hemoglobin 8.6 (*)     Hematocrit 27.1 (*)     MCHC 31.7 (*)     RDW 15.0 (*)     MPV 9.1 (*)     Immature Granulocytes 1.0 (*)     Gran # (ANC) 10.3 (*)     Immature Grans (Abs) 0.13 (*)     Gran % 78.5 (*)     Lymph % 12.4 (*)     All other components within normal limits   COMPREHENSIVE METABOLIC PANEL   URINALYSIS, REFLEX TO URINE CULTURE   HIV 1 / 2 ANTIBODY   HEPATITIS C ANTIBODY   LACTIC ACID, PLASMA   TSH   TROPONIN I   MAGNESIUM    PHOSPHORUS   URINALYSIS MICROSCOPIC          Imaging Results    None          Medications   sodium chloride 0.9% bolus 1,000 mL 1,000 mL (has no administration in time range)   ondansetron injection 4 mg (has no administration in time range)     Medical Decision Making:   History:   I obtained history from: EMS provider.       <> Summary of History: As above.  Old Medical Records: I decided to obtain old medical records.  Old Records Summarized: records from clinic visits and records from previous admission(s).       <> Summary of Records: As above.  Initial Assessment:   This is a 73-year-old female who presents to the emergency department for acute kidney injury and acidosis as discovered on recent labs.  She was sent in for further evaluation given these findings.  Differential Diagnosis:   We are repeating those labs here.  We also note that the patient has significant skin breakdown to the sacral area.  She states that she is not been eating and drinking well.  I hypothesized that her ZAK is likely due to dehydration.  We will give her some fluid hydration here intravenously.  Also some nausea medication given that she was vomiting upon arrival.  We will search for causes of the dehydration and fatigue including a cardiac workup, electrolytes, CBC looking for anemia and markers of infection, common sources of infection including a chest x-ray and a urine, and thyroid studies.  Initially she is afebrile and has stable blood pressure and heart rate.  She does not appear frankly septic upon initial evaluation.  Independently Interpreted Test(s):   I have ordered and independently interpreted X-rays - see prior notes.  I have ordered and independently interpreted EKG Reading(s) - see prior notes  Clinical Tests:   Lab Tests: Ordered and Reviewed  Radiological Study: Ordered and Reviewed  Medical Tests: Ordered and Reviewed  ED Management:  ECG, independently reviewed and interpreted by me, reveals sinus rhythm  with nonspecific st/t wave changes not concerning for acute ischemia or infarction. WBC is 13.15. Lactate is normal, interestingly at 0.8. Creatnine is acutely elevated at 4.7. K is normal. Bicarb however is 7 with an elevated anion gap. ? Uremia as the source. BNP is 390. CXR, independently reviewed and interpreted by me and interpreted by radiology, reveals no acute infiltrate or effusion or overt edema. Urine reveals > 100 WBCs. Trop is 0.063.   I feel the patient is going to require admission for multiple ongoing issues.   She has acute on chronic renal failure. She has a wide gap metabolic acidosis with a severely depressed bicarb. Doesn't seem to be due to a lactic acidosis. ? Uremia. She has a complicated UTI with h/o MDRO. No cathie sepsis at this time. Getting IV fluids (as she has not been eating, has been vomiting, and is dehydrated) and broad spectrum abx. Will need to get nephrology involved during her admission. She has severe skin breakdown on her buttocks/sacral area. I have involved social work down here in the ED.   ED Diagnosis:  1. Acute on chronic renal insufficiency.   2. Acute metabolic acidosis, wide gap, with severely depressed bicarb.   3. Acute complicated UTI, with h/o MDRO.   4. Acute dehydration.   5. Sacral decubiti.   6. Above diagnoses complicated by known afib, diabetes, hyperlipidemia, hypothyroidism, h/o breast cancer, and h/o MDRO UTIs.             Attending Attestation:         Attending Critical Care:   Critical Care Times:   Direct Patient Care (initial evaluation, reassessments, and time considering the case)................................................................32 minutes.   Additional History from reviewing old medical records or taking additional history from the family, EMS, PCP, etc.......................7 minutes.   Ordering, Reviewing, and Interpreting Diagnostic  Studies...............................................................................................................6 minutes.   Documentation..................................................................................................................................................................................5 minutes.   ==============================================================  Total Critical Care Time - exclusive of procedural time: 50 minutes.  ==============================================================  Critical care reasons: ZAK; WGMA; severely depressed bicarb.   Critical care was time spent personally by me on the following activities: obtaining history from patient or relative, examination of patient, review of old charts, ordering lab, x-rays, and/or EKG, development of treatment plan with patient or relative, ordering and performing treatments and interventions, evaluation of patient's response to treatment and re-evaluation of patient's conition.   Critical Care Condition: critical                      Clinical Impression:   Final diagnoses:  [R53.1] Weakness               Selene Bliss MD  08/15/23 182

## 2023-08-06 NOTE — ED NOTES
Nurses Note -- 4 Eyes      8/6/2023   6:12 PM      Skin assessed during: Daily Assessment      [] No Altered Skin Integrity Present    []Prevention Measures Documented      [x] Yes- Altered Skin Integrity Present or Discovered   [x] LDA Added if Not in Epic (Describe Wound)   [x] New Altered Skin Integrity was Present on Admit and Documented in LDA   [x] Wound Image Taken    Wound Care Consulted? Yes    Attending Nurse:  Jody Pemberton RN/Staff Member:   Nithin Hoover RN

## 2023-08-06 NOTE — PLAN OF CARE
[facility] - Emergency Dept  Initial Discharge Assessment       Primary Care Provider: Lurdes Navarro MD    Admission Diagnosis: Weakness [R53.1]    Admission Date: 8/6/2023  Expected Discharge Date:     Pt stated she uses a wheelchair to ambulate and her sister helps with her ADL's.      Pt stated she has her sister help minimally and uses wet wipes to wash.  SW discussed pt wounds with her and the need to keep herself appropriately clean.  SW discussed self-neglect with pt and the challenges on her health.  SW informed pt that to help with her independence she will need to learn to take care of herself in a different way such as transferring from her chair to shower and to clean her wounds.      Pt stated she is current with [facility] and they come once per month to change her catheter and check her vitals.  SW asked if she could put a referral to them for PT/OT, nursing and SW support.  Pt was agreeable.    SW messaged team with information and asked for PT/OT consults    Pt to d/c with Home Health or to SNF (if pt agreeable to SNF - SW did not speak with pt about SNF care at this time).      Payor: [employer] MANAGED MEDICARE / Plan: [employer] HMO PPO SPECIAL NEEDS / Product Type: Medicare Advantage /     Extended Emergency Contact Information  Primary Emergency Contact: Kat Sánchez  Address: [address] [place] [address]           [place] [address] [facility]  Home Phone: [phone]  Mobile Phone: [phone]  Relation: Sister    Discharge Plan A: Home Health, Skilled Nursing Facility  Discharge Plan B: Home Health, Skilled Nursing Facility      [id number] - [place], [place] - [address]  [address]  [place] [address]  Phone: [phone] Fax: [phone]    [id number] - [place] - [address] [place] AT [facility] [address]  [address] [place] [address]  Phone: [phone] Fax: [phone]      Initial  Assessment (most recent)       Adult Discharge Assessment - 08/06/23 1819          Discharge Assessment    Assessment Type Discharge Planning Assessment     Confirmed/corrected address, phone number and insurance Yes     Confirmed Demographics Correct on Facesheet     Source of Information patient     Communicated FLORENTINO with patient/caregiver Yes     People in Home sibling(s)     Facility Arrived From: home     Do you expect to return to your current living situation? Yes     Do you have help at home or someone to help you manage your care at home? Yes     Who are your caregiver(s) and their phone number(s)? sister helps with some ADL's     Prior to hospitilization cognitive status: No Deficits;Alert/Oriented     Current cognitive status: Alert/Oriented;No Deficits     Walking or Climbing Stairs ambulation difficulty, requires equipment     Mobility Management wheelchair     Dressing/Bathing bathing difficulty, assistance 1 person;dressing difficulty, assistance 1 person     Dressing/Bathing Management uses wet wipes to bathe and requires sister to help with some other ADL's     Home Accessibility wheelchair accessible   has a ramp to enter home    Home Layout Able to live on 1st floor     Equipment Currently Used at Home wheelchair     Patient currently being followed by outpatient case management? No     Do you currently have service(s) that help you manage your care at home? Yes     Name and Contact number of agency once per month has [facility] change catheter and take vitals     Is the pt/caregiver preference to resume services with current agency Yes     Do you have any problems affording any of your prescribed medications? No     Is the patient taking medications as prescribed? yes     How do you get to doctors appointments? health plan transportation;family or friend will provide     Discharge Plan A Home Health;Skilled Nursing Facility     Discharge Plan B Home Health;Skilled Nursing Facility     DME  Needed Upon Discharge  other (see comments)   tbd    Discharge Plan discussed with: Patient (P)         OTHER    Name(s) of People in Home Sister Kat (P)                       Ghada Rebolledo CD, MSW, LMSW, RSW   Case Management  [facility]  Email: [email or web address]

## 2023-08-06 NOTE — PLAN OF CARE
08/06/23 1828   Post-Acute Status   Post-Acute Authorization Placement;Home Health   Home Health Status Referrals Sent   Discharge Delays None known at this time   Discharge Plan   Discharge Plan A Home Health;Skilled Nursing Facility     SW sent referral via careport to [facility] and [facility] as per pt request      Pt may require SNF level of care - [facility] has not spoken to pt regarding this and no referrals have been sent out      Ghada Rebolledo CD, MSW, LMSW, RSW   Case Management  [facility]  Email: [email or web address]

## 2023-08-06 NOTE — ED NOTES
"The patient was brought to the ER today via EMS from home because her PCPs nurse called her and was told that her blood work done on Friday was "off" and she needed it rechecked. + vomiting and pain in "back butt and head and stomach". Patient states the reason she is vomiting is because she has nothing on her stomach. Lives with her sister. Suprapubic catheter in place (has had it changed last week). Pt is wearing glasses. Uses lift chair at home. Does not walk. Has a HOYA lift. Has nonskid socks on. Nkda. + leg swelling has been present x 6 months. Patient was on dialysis 1 year ago but "worked herself off of it". States her "kidneys woke up". The patient was changed into a gown  "

## 2023-08-07 ENCOUNTER — TELEPHONE (OUTPATIENT)
Dept: INTERNAL MEDICINE | Facility: CLINIC | Age: 73
End: 2023-08-07
Payer: MEDICARE

## 2023-08-07 ENCOUNTER — TELEPHONE (OUTPATIENT)
Dept: ENDOSCOPY | Facility: HOSPITAL | Age: 73
End: 2023-08-07

## 2023-08-07 PROBLEM — E11.59 HYPERTENSION ASSOCIATED WITH DIABETES: Chronic | Status: ACTIVE | Noted: 2022-01-24

## 2023-08-07 PROBLEM — E11.22 TYPE 2 DIABETES MELLITUS WITH STAGE 4 CHRONIC KIDNEY DISEASE, WITH LONG-TERM CURRENT USE OF INSULIN: Chronic | Status: ACTIVE | Noted: 2020-02-07

## 2023-08-07 PROBLEM — I15.2 HYPERTENSION ASSOCIATED WITH DIABETES: Chronic | Status: ACTIVE | Noted: 2022-01-24

## 2023-08-07 PROBLEM — Z79.4 TYPE 2 DIABETES MELLITUS WITH STAGE 4 CHRONIC KIDNEY DISEASE, WITH LONG-TERM CURRENT USE OF INSULIN: Chronic | Status: ACTIVE | Noted: 2020-02-07

## 2023-08-07 PROBLEM — N18.9 ACUTE KIDNEY INJURY SUPERIMPOSED ON CKD: Status: ACTIVE | Noted: 2021-11-28

## 2023-08-07 PROBLEM — N18.4 TYPE 2 DIABETES MELLITUS WITH STAGE 4 CHRONIC KIDNEY DISEASE, WITH LONG-TERM CURRENT USE OF INSULIN: Chronic | Status: ACTIVE | Noted: 2020-02-07

## 2023-08-07 LAB
ALBUMIN SERPL BCP-MCNC: 2.2 G/DL (ref 3.5–5.2)
ALBUMIN SERPL BCP-MCNC: 2.2 G/DL (ref 3.5–5.2)
ALLENS TEST: ABNORMAL
ALLENS TEST: ABNORMAL
ALP SERPL-CCNC: 89 U/L (ref 55–135)
ALP SERPL-CCNC: 97 U/L (ref 55–135)
ALT SERPL W/O P-5'-P-CCNC: 10 U/L (ref 10–44)
ALT SERPL W/O P-5'-P-CCNC: 10 U/L (ref 10–44)
ANION GAP SERPL CALC-SCNC: 13 MMOL/L (ref 8–16)
ANION GAP SERPL CALC-SCNC: 15 MMOL/L (ref 8–16)
ANION GAP SERPL CALC-SCNC: 15 MMOL/L (ref 8–16)
ASCENDING AORTA: 4.01 CM
AST SERPL-CCNC: 11 U/L (ref 10–40)
AST SERPL-CCNC: 9 U/L (ref 10–40)
AV INDEX (PROSTH): 0.76
AV MEAN GRADIENT: 9 MMHG
AV PEAK GRADIENT: 15 MMHG
AV VALVE AREA BY VELOCITY RATIO: 3.01 CM²
AV VALVE AREA: 3.03 CM²
AV VELOCITY RATIO: 0.75
BASOPHILS # BLD AUTO: 0.06 K/UL (ref 0–0.2)
BASOPHILS NFR BLD: 0.5 % (ref 0–1.9)
BILIRUB SERPL-MCNC: 0.2 MG/DL (ref 0.1–1)
BILIRUB SERPL-MCNC: 0.2 MG/DL (ref 0.1–1)
BSA FOR ECHO PROCEDURE: 2.34 M2
BUN SERPL-MCNC: 63 MG/DL (ref 8–23)
BUN SERPL-MCNC: 63 MG/DL (ref 8–23)
BUN SERPL-MCNC: 65 MG/DL (ref 8–23)
CALCIUM SERPL-MCNC: 7.8 MG/DL (ref 8.7–10.5)
CALCIUM SERPL-MCNC: 7.9 MG/DL (ref 8.7–10.5)
CALCIUM SERPL-MCNC: 8 MG/DL (ref 8.7–10.5)
CHLORIDE SERPL-SCNC: 110 MMOL/L (ref 95–110)
CHLORIDE SERPL-SCNC: 110 MMOL/L (ref 95–110)
CHLORIDE SERPL-SCNC: 111 MMOL/L (ref 95–110)
CO2 SERPL-SCNC: 13 MMOL/L (ref 23–29)
CO2 SERPL-SCNC: 8 MMOL/L (ref 23–29)
CO2 SERPL-SCNC: 8 MMOL/L (ref 23–29)
CREAT SERPL-MCNC: 4.4 MG/DL (ref 0.5–1.4)
CREAT SERPL-MCNC: 4.4 MG/DL (ref 0.5–1.4)
CREAT SERPL-MCNC: 4.6 MG/DL (ref 0.5–1.4)
CV ECHO LV RWT: 0.5 CM
DIFFERENTIAL METHOD: ABNORMAL
DOP CALC AO PEAK VEL: 1.96 M/S
DOP CALC AO VTI: 45.18 CM
DOP CALC LVOT AREA: 4 CM2
DOP CALC LVOT DIAMETER: 2.26 CM
DOP CALC LVOT PEAK VEL: 1.47 M/S
DOP CALC LVOT STROKE VOLUME: 136.96 CM3
DOP CALC MV VTI: 37.99 CM
DOP CALCLVOT PEAK VEL VTI: 34.16 CM
E WAVE DECELERATION TIME: 209.34 MSEC
E/A RATIO: 0.83
E/E' RATIO: 15.07 M/S
ECHO LV POSTERIOR WALL: 1.16 CM (ref 0.6–1.1)
EJECTION FRACTION: 60 %
EOSINOPHIL # BLD AUTO: 0.1 K/UL (ref 0–0.5)
EOSINOPHIL NFR BLD: 0.5 % (ref 0–8)
ERYTHROCYTE [DISTWIDTH] IN BLOOD BY AUTOMATED COUNT: 14.9 % (ref 11.5–14.5)
EST. GFR  (NO RACE VARIABLE): 10.2 ML/MIN/1.73 M^2
EST. GFR  (NO RACE VARIABLE): 10.2 ML/MIN/1.73 M^2
EST. GFR  (NO RACE VARIABLE): 9.6 ML/MIN/1.73 M^2
FRACTIONAL SHORTENING: 30 % (ref 28–44)
GLUCOSE SERPL-MCNC: 139 MG/DL (ref 70–110)
GLUCOSE SERPL-MCNC: 160 MG/DL (ref 70–110)
GLUCOSE SERPL-MCNC: 197 MG/DL (ref 70–110)
HCO3 UR-SCNC: 11 MMOL/L (ref 24–28)
HCO3 UR-SCNC: 11.5 MMOL/L (ref 24–28)
HCT VFR BLD AUTO: 24.9 % (ref 37–48.5)
HCT VFR BLD CALC: 23 %PCV (ref 36–54)
HGB BLD-MCNC: 7.9 G/DL (ref 12–16)
IMM GRANULOCYTES # BLD AUTO: 0.16 K/UL (ref 0–0.04)
IMM GRANULOCYTES NFR BLD AUTO: 1.3 % (ref 0–0.5)
INTERVENTRICULAR SEPTUM: 1.06 CM (ref 0.6–1.1)
LA MAJOR: 6.18 CM
LA MINOR: 6.08 CM
LA WIDTH: 5.01 CM
LEFT ATRIUM SIZE: 4.42 CM
LEFT ATRIUM VOLUME INDEX MOD: 45.8 ML/M2
LEFT ATRIUM VOLUME INDEX: 51.3 ML/M2
LEFT ATRIUM VOLUME MOD: 103.13 CM3
LEFT ATRIUM VOLUME: 115.37 CM3
LEFT INTERNAL DIMENSION IN SYSTOLE: 3.28 CM (ref 2.1–4)
LEFT VENTRICLE DIASTOLIC VOLUME INDEX: 44.88 ML/M2
LEFT VENTRICLE DIASTOLIC VOLUME: 100.97 ML
LEFT VENTRICLE MASS INDEX: 84 G/M2
LEFT VENTRICLE SYSTOLIC VOLUME INDEX: 19.4 ML/M2
LEFT VENTRICLE SYSTOLIC VOLUME: 43.62 ML
LEFT VENTRICULAR INTERNAL DIMENSION IN DIASTOLE: 4.67 CM (ref 3.5–6)
LEFT VENTRICULAR MASS: 188 G
LV LATERAL E/E' RATIO: 14.13 M/S
LV SEPTAL E/E' RATIO: 16.14 M/S
LYMPHOCYTES # BLD AUTO: 1.4 K/UL (ref 1–4.8)
LYMPHOCYTES NFR BLD: 11.2 % (ref 18–48)
MAGNESIUM SERPL-MCNC: 1.6 MG/DL (ref 1.6–2.6)
MCH RBC QN AUTO: 27.2 PG (ref 27–31)
MCHC RBC AUTO-ENTMCNC: 31.7 G/DL (ref 32–36)
MCV RBC AUTO: 86 FL (ref 82–98)
MONOCYTES # BLD AUTO: 1.1 K/UL (ref 0.3–1)
MONOCYTES NFR BLD: 8.5 % (ref 4–15)
MV MEAN GRADIENT: 4 MMHG
MV PEAK A VEL: 1.36 M/S
MV PEAK E VEL: 1.13 M/S
MV PEAK GRADIENT: 9 MMHG
MV VALVE AREA BY CONTINUITY EQUATION: 3.61 CM2
NEUTROPHILS # BLD AUTO: 9.9 K/UL (ref 1.8–7.7)
NEUTROPHILS NFR BLD: 78 % (ref 38–73)
NRBC BLD-RTO: 0 /100 WBC
PCO2 BLDA: 27.4 MMHG (ref 35–45)
PCO2 BLDA: 37.1 MMHG (ref 35–45)
PH SMN: 7.1 [PH] (ref 7.35–7.45)
PH SMN: 7.21 [PH] (ref 7.35–7.45)
PHOSPHATE SERPL-MCNC: 6.8 MG/DL (ref 2.7–4.5)
PISA TR MAX VEL: 2.4 M/S
PLATELET # BLD AUTO: 377 K/UL (ref 150–450)
PMV BLD AUTO: 9.3 FL (ref 9.2–12.9)
PO2 BLDA: 21 MMHG (ref 40–60)
PO2 BLDA: 42 MMHG (ref 40–60)
POC BE: -17 MMOL/L
POC BE: -18 MMOL/L
POC IONIZED CALCIUM: 1.19 MMOL/L (ref 1.06–1.42)
POC SATURATED O2: 21 % (ref 95–100)
POC SATURATED O2: 68 % (ref 95–100)
POC TCO2: 12 MMOL/L (ref 24–29)
POC TCO2: 13 MMOL/L (ref 24–29)
POCT GLUCOSE: 151 MG/DL (ref 70–110)
POCT GLUCOSE: 184 MG/DL (ref 70–110)
POCT GLUCOSE: 202 MG/DL (ref 70–110)
POTASSIUM BLD-SCNC: 3.2 MMOL/L (ref 3.5–5.1)
POTASSIUM SERPL-SCNC: 3.1 MMOL/L (ref 3.5–5.1)
POTASSIUM SERPL-SCNC: 3.5 MMOL/L (ref 3.5–5.1)
POTASSIUM SERPL-SCNC: 4.2 MMOL/L (ref 3.5–5.1)
PROT SERPL-MCNC: 5.3 G/DL (ref 6–8.4)
PROT SERPL-MCNC: 5.4 G/DL (ref 6–8.4)
RA MAJOR: 3.99 CM
RA PRESSURE ESTIMATED: 3 MMHG
RA WIDTH: 3.4 CM
RBC # BLD AUTO: 2.9 M/UL (ref 4–5.4)
RIGHT VENTRICULAR END-DIASTOLIC DIMENSION: 3.03 CM
RV TB RVSP: 5 MMHG
SAMPLE: ABNORMAL
SAMPLE: ABNORMAL
SINUS: 3.5 CM
SITE: ABNORMAL
SITE: ABNORMAL
SODIUM BLD-SCNC: 136 MMOL/L (ref 136–145)
SODIUM SERPL-SCNC: 133 MMOL/L (ref 136–145)
SODIUM SERPL-SCNC: 133 MMOL/L (ref 136–145)
SODIUM SERPL-SCNC: 137 MMOL/L (ref 136–145)
STJ: 2.54 CM
TDI LATERAL: 0.08 M/S
TDI SEPTAL: 0.07 M/S
TDI: 0.08 M/S
TR MAX PG: 23 MMHG
TRICUSPID ANNULAR PLANE SYSTOLIC EXCURSION: 2.09 CM
TROPONIN I SERPL DL<=0.01 NG/ML-MCNC: 0.07 NG/ML (ref 0–0.03)
TV REST PULMONARY ARTERY PRESSURE: 26 MMHG
WBC # BLD AUTO: 12.64 K/UL (ref 3.9–12.7)
Z-SCORE OF LEFT VENTRICULAR DIMENSION IN END DIASTOLE: -5.57
Z-SCORE OF LEFT VENTRICULAR DIMENSION IN END SYSTOLE: -3.23

## 2023-08-07 PROCEDURE — 25000003 PHARM REV CODE 250: Mod: HCNC

## 2023-08-07 PROCEDURE — 84484 ASSAY OF TROPONIN QUANT: CPT | Mod: HCNC

## 2023-08-07 PROCEDURE — 37799 UNLISTED PX VASCULAR SURGERY: CPT | Mod: HCNC

## 2023-08-07 PROCEDURE — 99232 SBSQ HOSP IP/OBS MODERATE 35: CPT | Mod: HCNC,GC,, | Performed by: HOSPITALIST

## 2023-08-07 PROCEDURE — 82800 BLOOD PH: CPT | Mod: HCNC

## 2023-08-07 PROCEDURE — 63600175 PHARM REV CODE 636 W HCPCS: Mod: HCNC

## 2023-08-07 PROCEDURE — 27000190 HC CPAP FULL FACE MASK W/VALVE: Mod: HCNC

## 2023-08-07 PROCEDURE — 94761 N-INVAS EAR/PLS OXIMETRY MLT: CPT | Mod: HCNC

## 2023-08-07 PROCEDURE — 25000003 PHARM REV CODE 250: Mod: HCNC | Performed by: STUDENT IN AN ORGANIZED HEALTH CARE EDUCATION/TRAINING PROGRAM

## 2023-08-07 PROCEDURE — 99223 PR INITIAL HOSPITAL CARE,LEVL III: ICD-10-PCS | Mod: HCNC,,, | Performed by: INTERNAL MEDICINE

## 2023-08-07 PROCEDURE — 36415 COLL VENOUS BLD VENIPUNCTURE: CPT | Mod: HCNC

## 2023-08-07 PROCEDURE — 99232 PR SUBSEQUENT HOSPITAL CARE,LEVL II: ICD-10-PCS | Mod: HCNC,GC,, | Performed by: HOSPITALIST

## 2023-08-07 PROCEDURE — 80053 COMPREHEN METABOLIC PANEL: CPT | Mod: 91,HCNC

## 2023-08-07 PROCEDURE — 83735 ASSAY OF MAGNESIUM: CPT | Mod: HCNC

## 2023-08-07 PROCEDURE — 99223 1ST HOSP IP/OBS HIGH 75: CPT | Mod: HCNC,,, | Performed by: INTERNAL MEDICINE

## 2023-08-07 PROCEDURE — 99900035 HC TECH TIME PER 15 MIN (STAT): Mod: HCNC

## 2023-08-07 PROCEDURE — 20600001 HC STEP DOWN PRIVATE ROOM: Mod: HCNC

## 2023-08-07 PROCEDURE — 80048 BASIC METABOLIC PNL TOTAL CA: CPT | Mod: HCNC

## 2023-08-07 PROCEDURE — 94660 CPAP INITIATION&MGMT: CPT | Mod: HCNC

## 2023-08-07 PROCEDURE — 80053 COMPREHEN METABOLIC PANEL: CPT | Mod: HCNC

## 2023-08-07 PROCEDURE — 84100 ASSAY OF PHOSPHORUS: CPT | Mod: HCNC

## 2023-08-07 PROCEDURE — 85025 COMPLETE CBC W/AUTO DIFF WBC: CPT | Mod: HCNC

## 2023-08-07 PROCEDURE — 82803 BLOOD GASES ANY COMBINATION: CPT | Mod: HCNC

## 2023-08-07 RX ORDER — FUROSEMIDE 10 MG/ML
80 INJECTION INTRAMUSCULAR; INTRAVENOUS ONCE
Status: COMPLETED | OUTPATIENT
Start: 2023-08-07 | End: 2023-08-07

## 2023-08-07 RX ORDER — POTASSIUM CHLORIDE 20 MEQ/1
40 TABLET, EXTENDED RELEASE ORAL ONCE
Status: COMPLETED | OUTPATIENT
Start: 2023-08-07 | End: 2023-08-07

## 2023-08-07 RX ORDER — SODIUM BICARBONATE 650 MG/1
1300 TABLET ORAL 3 TIMES DAILY
Status: DISCONTINUED | OUTPATIENT
Start: 2023-08-07 | End: 2023-08-07

## 2023-08-07 RX ORDER — CARVEDILOL 3.12 MG/1
3.12 TABLET ORAL 2 TIMES DAILY
Status: DISCONTINUED | OUTPATIENT
Start: 2023-08-07 | End: 2023-08-08

## 2023-08-07 RX ORDER — INSULIN ASPART 100 [IU]/ML
0-5 INJECTION, SOLUTION INTRAVENOUS; SUBCUTANEOUS
Status: DISCONTINUED | OUTPATIENT
Start: 2023-08-07 | End: 2023-08-12 | Stop reason: HOSPADM

## 2023-08-07 RX ADMIN — HYDROCODONE BITARTRATE AND ACETAMINOPHEN 1 TABLET: 10; 325 TABLET ORAL at 09:08

## 2023-08-07 RX ADMIN — CARVEDILOL 3.12 MG: 3.12 TABLET, FILM COATED ORAL at 09:08

## 2023-08-07 RX ADMIN — SODIUM BICARBONATE: 84 INJECTION, SOLUTION INTRAVENOUS at 02:08

## 2023-08-07 RX ADMIN — OXYBUTYNIN CHLORIDE 10 MG: 5 TABLET, EXTENDED RELEASE ORAL at 09:08

## 2023-08-07 RX ADMIN — POTASSIUM CHLORIDE 40 MEQ: 1500 TABLET, EXTENDED RELEASE ORAL at 10:08

## 2023-08-07 RX ADMIN — APIXABAN 2.5 MG: 2.5 TABLET, FILM COATED ORAL at 09:08

## 2023-08-07 RX ADMIN — INSULIN ASPART 1 UNITS: 100 INJECTION, SOLUTION INTRAVENOUS; SUBCUTANEOUS at 09:08

## 2023-08-07 RX ADMIN — LEVOTHYROXINE SODIUM 50 MCG: 50 TABLET ORAL at 05:08

## 2023-08-07 RX ADMIN — SODIUM BICARBONATE: 84 INJECTION, SOLUTION INTRAVENOUS at 01:08

## 2023-08-07 RX ADMIN — ONDANSETRON 8 MG: 8 TABLET, ORALLY DISINTEGRATING ORAL at 05:08

## 2023-08-07 RX ADMIN — HYDROCODONE BITARTRATE AND ACETAMINOPHEN 1 TABLET: 10; 325 TABLET ORAL at 04:08

## 2023-08-07 RX ADMIN — METHOCARBAMOL 1000 MG: 750 TABLET, FILM COATED ORAL at 04:08

## 2023-08-07 RX ADMIN — ANASTROZOLE 1 MG: 1 TABLET, COATED ORAL at 09:08

## 2023-08-07 RX ADMIN — TRAZODONE HYDROCHLORIDE 100 MG: 50 TABLET ORAL at 09:08

## 2023-08-07 RX ADMIN — SODIUM BICARBONATE 650 MG TABLET 1300 MG: at 10:08

## 2023-08-07 RX ADMIN — ATORVASTATIN CALCIUM 80 MG: 20 TABLET, FILM COATED ORAL at 09:08

## 2023-08-07 RX ADMIN — INSULIN DETEMIR 8 UNITS: 100 INJECTION, SOLUTION SUBCUTANEOUS at 09:08

## 2023-08-07 RX ADMIN — METHOCARBAMOL 1000 MG: 750 TABLET, FILM COATED ORAL at 09:08

## 2023-08-07 RX ADMIN — FUROSEMIDE 80 MG: 10 INJECTION, SOLUTION INTRAVENOUS at 01:08

## 2023-08-07 RX ADMIN — HYDROCODONE BITARTRATE AND ACETAMINOPHEN 1 TABLET: 10; 325 TABLET ORAL at 01:08

## 2023-08-07 RX ADMIN — APIXABAN 5 MG: 5 TABLET, FILM COATED ORAL at 09:08

## 2023-08-07 RX ADMIN — SEVELAMER CARBONATE 800 MG: 800 TABLET, FILM COATED ORAL at 09:08

## 2023-08-07 NOTE — H&P
"[facility] - Emergency Dept  [facility] Medicine  History & Physical    Patient Name: Cecile Bowen  MRN: [id number]  Patient Class: IP- Inpatient  Admission Date: 8/6/2023  Attending Physician: Meagan Templeton*   Primary Care Provider: Lurdes Navarro MD         Patient information was obtained from patient, past medical records and ER records.     Subjective:     Principal Problem:Acute renal failure superimposed on stage 5 chronic kidney disease, not on chronic dialysis    Chief Complaint:   Chief Complaint   Patient presents with    Abnormal lab result from PCP     Her primary called her and told her to go ER due to elevated creatinine.         HPI: Cecile Bowen is 73 y.o. with T2DM on insulin, Afib on Eliquis, neurogenic bladder with suprapubic catheter, CKD4 previously on dialysis, HLD, hypothyroidism, migraine, chronic pain who presents to [facility] for critical labs from her PCP visit. PCP instructed pt on Friday 8/04 to present to ED for bicarb less than 5 and ZAK. She felt like she was fine and waited till Sunday 08/06 to present to ED. She reports "feeling low physically" and that's what prompted her to come in. Reports good hydration but poor oral nutrition; not much food in the house. She has a suprapubic catheter that was placed 7 years ago, and was recently changed 2 wks ago, per pt. Denies smoking use or hx, alcohol use or other use of illict drugs. Reports chronic constipation, chronic back pain, intermittent productive cough, weakness, chronic bilateral floaters and lightheadedness. She is wheelchair bound because she reports no muscle tone in legs. Reports urinating well. Cannot account for dysuria as pt has catheter in place, but does report recently foul smelling urine. Denies CP, SOB, unexpected weight loss, fever, abdominal pain, diarrhea. Denies any recent changes to medications or any sick contacts. Denies recent falls; last fall was one year w/o head trauma.     Was " "previously on dialysis for about 6 months about a year ago, but was told "her kidneys woke up" so she was able to come off of it.  Reports that care is in the hands of her sister and feels that she does not receive much care.  She also reports not being very hungry usually because she tends to get nauseated by food.  See CM/SW and nursing notes for more info on housing situation.         Past Medical History:   Diagnosis Date    Cervicogenic migraine     Chronic pain     CKD (chronic kidney disease) stage 4, GFR 15-29 ml/min     [facility]    CKD (chronic kidney disease) stage 4, GFR 15-29 ml/min     Diabetes mellitus     Long term use of Insulin, Diabetic Neuropathy    Dialysis patient 1/21/2022    Fibromyalgia     Hydronephrosis     Hyperlipidemia     Hypertension 12/12/2012    Hypothyroidism 12/12/2012    ARON (iron deficiency anemia)     Insomnia     Levoscoliosis     Malignant neoplasm of upper-outer quadrant of left breast in female, estrogen receptor positive     Metabolic acidosis     Mobility impaired     Nuclear sclerosis - Both Eyes 3/24/2014    VIJAYA (obstructive sleep apnea)     Osteopenia     Pulmonary nodule     Recurrent UTI     Renal manifestation of secondary diabetes mellitus     Secondary hyperparathyroidism, renal     Urinary retention        Past Surgical History:   Procedure Laterality Date    BIOPSY OF URETER Left 2/18/2022    Procedure: BIOPSY, URETER;  Surgeon: Ronel Whittington MD;  Location: [facility] OR [address] [place] [address];  Service: Urology;  Laterality: Left;    BREAST BIOPSY Right     benign    CHOLECYSTECTOMY      COLONOSCOPY N/A 1/13/2017    Procedure: COLONOSCOPY;  Surgeon: Morris Wiseman MD;  Location: [facility] (4TH FLR);  Service: Endoscopy;  Laterality: N/A;  Renal pt Nephrology advised to avoid phosphate preps    CYSTOSCOPY N/A 10/8/2018    Procedure: CYSTOSCOPY;  Surgeon: Ronel Whittington MD;  [facility];  Service: Urology;  " Laterality: N/A;  45 min    CYSTOSCOPY N/A 3/25/2019    Procedure: CYSTOSCOPY;  Surgeon: Ronel Whittington MD;  Location: [facility] OR 1ST FLR;  Service: Urology;  Laterality: N/A;  45 min    CYSTOSCOPY N/A 8/26/2019    Procedure: CYSTOSCOPY;  Surgeon: Ronel Whittington MD;  Location: [facility] OR 1ST FLR;  Service: Urology;  Laterality: N/A;  45 min    CYSTOSCOPY N/A 7/2/2021    Procedure: CYSTOSCOPY;  Surgeon: Ronel Whittington MD;  Location: [facility] OR 1ST FLR;  Service: Urology;  Laterality: N/A;    CYSTOSCOPY  12/23/2021    Procedure: CYSTOSCOPY;  Surgeon: Ronel Whittington MD;  Location: [facility] OR 1ST FLR;  Service: Urology;;    CYSTOSCOPY  2/18/2022    Procedure: CYSTOSCOPY;  Surgeon: Ronel Whittington MD;  Location: [facility] OR 1ST FLR;  Service: Urology;;    CYSTOSCOPY W/ URETERAL STENT PLACEMENT Left 5/15/2021    Procedure: CYSTOSCOPY, WITH URETERAL STENT INSERTION;  Surgeon: Levy Sánchez Jr., MD;  Location: [facility] OR 1ST FLR;  Service: Urology;  Laterality: Left;    CYSTOSCOPY WITH BIOPSY OF BLADDER N/A 1/27/2020    Procedure: CYSTOSCOPY, WITH BLADDER BIOPSY, WITH FULGURATION IF INDICATED;  Surgeon: Ronel Whittington MD;  Location: [facility] OR 1ST FLR;  Service: Urology;  Laterality: N/A;    DILATION AND CURETTAGE OF UTERUS      GALLBLADDER SURGERY  2006    HYSTERECTOMY      INJECTION FOR SENTINEL NODE IDENTIFICATION Left 8/1/2019    Procedure: INJECTION, FOR SENTINEL NODE IDENTIFICATION;  Surgeon: Samia Fulton MD;  Location: [facility] OR 2ND FLR;  Service: General;  Laterality: Left;    INJECTION OF BOTULINUM TOXIN TYPE A N/A 10/8/2018    Procedure: INJECTION, BOTULINUM TOXIN, TYPE A 300 UNITS;  Surgeon: Ronel Whittington MD;  Location: [facility] OR 1ST FLR;  Service: Urology;  Laterality: N/A;    INJECTION OF BOTULINUM TOXIN TYPE A N/A 3/25/2019    Procedure: INJECTION, BOTULINUM TOXIN, TYPE A 300 UNITS;  Surgeon: Ronel Whittington MD;  Location: [facility] OR [address] [place] [address];  Service:  Urology;  Laterality: N/A;    INJECTION OF BOTULINUM TOXIN TYPE A N/A 8/26/2019    Procedure: INJECTION, BOTULINUM TOXIN, TYPE A 300 UNITS;  Surgeon: Ronel Whittington MD;  Location: [facility] OR [address] [place] [address];  Service: Urology;  Laterality: N/A;    MASTECTOMY Left 8/1/2019    Procedure: MASTECTOMY 23 hour stay;  Surgeon: Samia Fulton MD;  Location: [facility] OR 2ND FLR;  Service: General;  Laterality: Left;    MEDIPORT REMOVAL Right 6/24/2022    Procedure: REMOVAL, CATHETER, CENTRAL VENOUS, TUNNELED, WITH PORT;  Surgeon: Isauro Anderson MD;  Location: [facility] CATH LAB;  Service: Radiology;  Laterality: Right;    OVARIAN CYST SURGERY  1985    REPLACEMENT OF STENT Left 12/23/2021    Procedure: REPLACEMENT, STENT;  Surgeon: Ronel Whittington MD;  Location: [facility] OR [address] [place] [address];  Service: Urology;  Laterality: Left;    REPLACEMENT OF STENT Left 2/18/2022    Procedure: REPLACEMENT, STENT;  Surgeon: Ronel Whittington MD;  Location: [facility] OR [address] [place] [address];  Service: Urology;  Laterality: Left;    RETROGRADE PYELOGRAPHY Left 2/18/2022    Procedure: PYELOGRAM, RETROGRADE;  Surgeon: Ronel Whittington MD;  Location: [facility] OR [address] [place] [address];  Service: Urology;  Laterality: Left;    SENTINEL LYMPH NODE BIOPSY Left 8/1/2019    Procedure: BIOPSY, LYMPH NODE, SENTINEL;  Surgeon: Samia Fulton MD;  Location: [facility] OR 2ND [facility];  Service: General;  Laterality: Left;    spt placement      TONSILLECTOMY, ADENOIDECTOMY      TOTAL ABDOMINAL HYSTERECTOMY W/ BILATERAL SALPINGOOPHORECTOMY  1985    URETEROSCOPY Left 2/18/2022    Procedure: URETEROSCOPY;  Surgeon: Ronel Whittington MD;  Location: [facility] OR [address] [place] [address];  Service: Urology;  Laterality: Left;       Review of patient's allergies indicates:  No Known Allergies    No current facility-administered medications on file prior to encounter.     Current Outpatient Medications on File Prior to Encounter   Medication Sig    anastrozole (ARIMIDEX) 1 mg Tab Take 1 tablet (1 mg total) by mouth  "once daily.    apixaban (ELIQUIS) 2.5 mg Tab Take 2.5 mg by mouth 2 (two) times daily.    atorvastatin (LIPITOR) 80 MG tablet Take 1 tablet (80 mg total) by mouth once daily.    BD INSULIN SYRINGE ULTRA-FINE 0.5 mL 31 gauge x 5/16" Syrg USE WITH INSULIN 4 TIMES A DAY    cloNIDine (CATAPRES) 0.1 MG tablet TAKE 2 TABLETS (0.2 MG TOTAL) BY MOUTH 3 (THREE) TIMES DAILY.    diclofenac sodium (VOLTAREN) 1 % Gel Apply to chest wall as needed for arthritis    DULoxetine (CYMBALTA) 20 MG capsule TAKE 2 CAPSULES BY MOUTH EVERY DAY    erenumab-aooe (AIMOVIG) 140 mg/mL autoinjector Inject 1 mL (140 mg total) into the skin every 30 days.    ergocalciferol (ERGOCALCIFEROL) 50,000 unit Cap TAKE ONE CAPSULE BY MOUTH ONE TIME PER WEEK, TAKES ON TUESDAYS    HYDROcodone-acetaminophen (NORCO)  mg per tablet Take 1 tablet by mouth every 6 (six) hours as needed for Pain.    insulin (LANTUS SOLOSTAR U-100 INSULIN) glargine 100 units/mL SubQ pen INJECT 14-15 UNITS UNDER THE SKIN ONCE DAILY    methocarbamoL (ROBAXIN) 750 MG Tab TAKE 1-2 TABLETS (750-1,500 MG TOTAL) BY MOUTH 3 (THREE) TIMES DAILY AS NEEDED (MUSCLE SPASMS).    ondansetron (ZOFRAN-ODT) 8 MG TbDL TAKE 1 TABLET BY MOUTH EVERY 12 HOURS AS NEEDED    oxybutynin (DITROPAN-XL) 10 MG 24 hr tablet Take 1 tablet (10 mg total) by mouth once daily.    pen needle, diabetic (BD ULTRA-FINE SHORT PEN NEEDLE) 31 gauge x 5/16" Ndle USE WITH LANTUS DAILY, e 11.65    sodium bicarbonate 650 MG tablet Take 1 tablet (650 mg total) by mouth 3 (three) times daily.    sumatriptan (IMITREX) 100 MG tablet Take 1 tablet (100 mg total) by mouth 2 (two) times daily as needed for Migraine. MUST MAKE APPOINTMENT FOR FURTHER REFILLS    SYNTHROID 50 mcg tablet TAKE 1 TABLET BY MOUTH BEFORE BREAKFAST. ADMINISTER ON AN EMPTY STOMACH AT LEAST 30 MINUTES BEFORE FOOD. IF RECEIVING TUBE FEEDS, HOLD TUBE FEEDS FOR 1 HOUR BEFORE AND AFTER LEVOTHYROXINE ADMINISTRATION.    traZODone (DESYREL) 100 " MG tablet TAKE 1 TABLET BY MOUTH NIGHTLY AS NEEDED FOR INSOMNIA.     Family History       Problem Relation (Age of Onset)    ALS Mother    Cancer Maternal Grandmother, Paternal Grandfather, Brother    Diabetes Sister, Maternal Aunt, Maternal Uncle    Kidney disease Sister, Maternal Aunt    Prostate cancer Brother    Scoliosis Brother          Tobacco Use    Smoking status: Never    Smokeless tobacco: Never   Substance and Sexual Activity    Alcohol use: No     Alcohol/week: 0.0 standard drinks of alcohol    Drug use: No    Sexual activity: Not Currently     Birth control/protection: Abstinence     Review of Systems  Objective:     Vital Signs (Most Recent):  Temp: 98.8 °F (37.1 °C) (08/06/23 1802)  Pulse: 80 (08/06/23 1802)  Resp: 17 (08/06/23 1802)  BP: (!) 200/85 (08/06/23 1802)  SpO2: 100 % (08/06/23 1802) Vital Signs (24h Range):  Temp:  [98.8 °F (37.1 °C)] 98.8 °F (37.1 °C)  Pulse:  [79-86] 80  Resp:  [15-18] 17  SpO2:  [97 %-100 %] 100 %  BP: (160-200)/(84-87) 200/85     Weight: 122.5 kg (270 lb)  Body mass index is 41.05 kg/m².     Physical Exam  Vitals and nursing note reviewed.   Constitutional:       General: She is not in acute distress.     Appearance: Normal appearance.   Cardiovascular:      Rate and Rhythm: Normal rate and regular rhythm.      Pulses: Normal pulses.      Heart sounds: Murmur (holosystolic murmur) heard.      Comments: Unable to feel distal pulses due to volume overload status. Not able to appreciate JVD  Pulmonary:      Effort: Pulmonary effort is normal.      Breath sounds: Normal breath sounds.   Abdominal:      General: Bowel sounds are normal.      Palpations: Abdomen is soft.      Tenderness: There is no abdominal tenderness. There is left CVA tenderness. There is no right CVA tenderness.      Comments: Suprapubic catheter in place   Musculoskeletal:      Right lower leg: Edema present.      Left lower leg: Edema present.      Comments: Pitting edema bilaterally upto hip  "  Skin:     General: Skin is warm.      Coloration: Skin is pale.   Neurological:      General: No focal deficit present.      Mental Status: She is alert. Mental status is at baseline.   Psychiatric:         Mood and Affect: Mood normal.         Behavior: Behavior normal.                Significant Labs: All pertinent labs within the past 24 hours have been reviewed.    Significant Imaging: I have reviewed all pertinent imaging results/findings within the past 24 hours.    Assessment/Plan:     * Acute renal failure superimposed on stage 5 chronic kidney disease, not on chronic dialysis  Patient with acute kidney injury/acute renal failure likely due to nonadherence to dialysis and volume overload that is contributing the excessive load on kidneys. ZAK is currently worsening. Baseline creatinine 2.2 - Labs reviewed- Renal function/electrolytes with Estimated Creatinine Clearance: 15.1 mL/min (A) (based on SCr of 4.7 mg/dL (H)). according to latest data. Monitor urine output and serial BMP and adjust therapy as needed. Avoid nephrotoxins and renally dose meds for GFR listed above.    - Nephro consulted for IP dialysis evaluation and treatment  - strict I/O  - fluid restriction of 1.5L  - holding home Duloxetine and Voltaren gel due to ZAK   - Single Lasix 80 dose; re-evaluate volume status post  - Current Cr 4.8    Bacteria in urine  No sxs of dysuria, frequency.    - will monitor and f/u with UCx      Pressure injury of buttock, stage 1  - wound care consulted      Uncomplicated opioid dependence  - continue home [place] for unbearable chronic back pain         Hypertension associated with diabetes  Patient's FSGs are controlled on current medication regimen.  Last A1c reviewed-   Lab Results   Component Value Date    HGBA1C 6.1 (H) 08/04/2023     Most recent fingerstick glucose reviewed- No results for input(s): "POCTGLUCOSE" in the last 24 hours.  Current correctional scale  Low  Maintain anti-hyperglycemic dose as " "follows-   Antihyperglycemics (From admission, onward)    Start     Stop Route Frequency Ordered    08/06/23 2100  insulin detemir U-100 (Levemir) pen 8 Units         -- SubQ Nightly 08/06/23 1920        Hold Oral hypoglycemics while patient is in the hospital.      - holding home clonidine, will initiate better BP meds regiment   - PRN hydralazine for SBP >180    A-fib  - continue home Eliquis    Type 2 diabetes mellitus with stage 4 chronic kidney disease, with long-term current use of insulin  Patient's FSGs are controlled on current medication regimen.  Last A1c reviewed-   Lab Results   Component Value Date    HGBA1C 6.1 (H) 08/04/2023     Most recent fingerstick glucose reviewed- No results for input(s): "POCTGLUCOSE" in the last 24 hours.  Current correctional scale  Low  Maintain anti-hyperglycemic dose as follows-   Antihyperglycemics (From admission, onward)    Start     Stop Route Frequency Ordered    08/06/23 2100  insulin detemir U-100 (Levemir) pen 8 Units         -- SubQ Nightly 08/06/23 1920        Hold Oral hypoglycemics while patient is in the hospital.    PLAN:  - Goal blood glucose range while admitted: 140-180 per the NICE-SUGAR trial and American Diabetes Association guidelines  - Detemir 8 units qhs  - SSI with POCT accuchecks ACHS and Diabetic diet 1500 duyen with fluid restriction 1500mL  - Diabetic counseling and education (eg nutrition, insulin) to be given prior to discharge      Suprapubic catheter  Neurogenic Bladder    Placed 7 yrs ago. Last changed 2 wks ago.       VIJAYA (obstructive sleep apnea)  - CPAP ordered      Hyperlipidemia associated with type 2 diabetes mellitus  - continue home atorvastatin      Hypothyroidism  - continue home synthroid      VTE Risk Mitigation (From admission, onward)         Ordered     apixaban tablet 2.5 mg  2 times daily         08/06/23 7297                           Marge Jennings MD  Department of Hospital Medicine  [facility] - Emergency " Dept

## 2023-08-07 NOTE — ASSESSMENT & PLAN NOTE
Patient's FSGs are controlled on current medication regimen.  Last A1c reviewed-   Lab Results   Component Value Date    HGBA1C 6.1 (H) 08/04/2023     Most recent fingerstick glucose reviewed-   Recent Labs   Lab 08/06/23 2038 08/07/23  0814   POCTGLUCOSE 176* 151*     Current correctional scale  Low  Maintain anti-hyperglycemic dose as follows-   Antihyperglycemics (From admission, onward)    Start     Stop Route Frequency Ordered    08/07/23 0835  insulin aspart U-100 pen 0-5 Units         -- SubQ Before meals & nightly PRN 08/07/23 0819    08/06/23 2100  insulin detemir U-100 (Levemir) pen 8 Units         -- SubQ Nightly 08/06/23 1920        Hold Oral hypoglycemics while patient is in the hospital.      - holding home clonidine  - initiated COREG 3.125 mg BID, will titrate as needed  - PRN hydralazine for SBP >180      BP Readings from Last 1 Encounters:   08/07/23 (!) 177/74

## 2023-08-07 NOTE — ASSESSMENT & PLAN NOTE
Normal anion gap metabolic acidosis    Patient with acute kidney injury/acute renal failure likely due to nonadherence to dialysis and volume overload that is contributing the excessive load on kidneys. ZAK is currently improving. Baseline creatinine 2.2 - Labs reviewed- Renal function/electrolytes with Estimated Creatinine Clearance: 14.9 mL/min (A) (based on SCr of 4.6 mg/dL (H)). according to latest data. Monitor urine output and serial BMP and adjust therapy as needed. Avoid nephrotoxins and renally dose meds for GFR listed above.    - Nephro recommends sodium bicarb gtt and IV lasix 80, with anticipating of iHD; bicarb improving, currently 13  - strict I/O  - Renal diet  - holding home Duloxetine and Voltaren gel due to ZAK   - Current Cr 4.6  - replenishing K as needed  - VBG 7.213 / 27.4 / 42 / 11.0 --> primary metabolic acidosis with compensatory respiratory alkalosis   - acidosis improving from 7.057 --> 7.213  - Per echo results from 08/07/23 EF 60% with normal systolic and estimated diastolic function, normal CVP   - indicating extravascular volume overload

## 2023-08-07 NOTE — ASSESSMENT & PLAN NOTE
"Patient's FSGs are controlled on current medication regimen.  Last A1c reviewed-   Lab Results   Component Value Date    HGBA1C 6.1 (H) 08/04/2023     Most recent fingerstick glucose reviewed- No results for input(s): "POCTGLUCOSE" in the last 24 hours.  Current correctional scale  Low  Maintain anti-hyperglycemic dose as follows-   Antihyperglycemics (From admission, onward)    Start     Stop Route Frequency Ordered    08/06/23 2100  insulin detemir U-100 (Levemir) pen 8 Units         -- SubQ Nightly 08/06/23 1920        Hold Oral hypoglycemics while patient is in the hospital.    PLAN:  - Goal blood glucose range while admitted: 140-180 per the NICE-SUGAR trial and American Diabetes Association guidelines  - Detemir 8 units qhs  - SSI with POCT accuchecks ACHS and Diabetic diet 1500 duyen with fluid restriction 1500mL  - Diabetic counseling and education (eg nutrition, insulin) to be given prior to discharge    "

## 2023-08-07 NOTE — H&P
See IR consult note dated 8/7/23    Lucero Ballard PA-C  Interventional Radiology   Spectra: [phone]

## 2023-08-07 NOTE — TELEPHONE ENCOUNTER
----- Message from Lurdes Navarro MD sent at 8/5/2023  2:28 PM CDT -----  Your lab has resulted and it appears that the on call physician advise you to go to the emergency room based on your worsening kidney function.  I do not see any notes from the emergency room and would once again recommend that you go to the emergency room for evaluation and treatment today.

## 2023-08-07 NOTE — NURSING
Nurses Note -- 4 Eyes      8/7/2023   0110 AM      Skin assessed during: Admit      [] No Altered Skin Integrity Present    []Prevention Measures Documented      [x] Yes- Altered Skin Integrity Present or Discovered   [x] LDA Added if Not in Epic (Describe Wound)   [x] New Altered Skin Integrity was Present on Admit and Documented in LDA   [x] Wound Image Taken    Wound Care Consulted? Yes    Attending Nurse:  Tammy Magallanes RN     Second RN/Staff Member:  Ondina Pham

## 2023-08-07 NOTE — CONSULTS
"Tunneled Dialysis Catheter Placement Consult Note  Interventional Radiology    Consult Requested By: Jnuior Tobin MD   Reason for Consult: Request tunnel cath placement for dialysis     SUBJECTIVE:     Chief Complaint:  TDC placement    History of Present Illness:  Cecile Bowen is a 73 y.o. female with a PMHx of T2DM on insulin, Afib on Eliquis, neurogenic bladder with suprapubic catheter, CKD4 previously on dialysis, HLD, hypothyroidism, migraine, chronic pain who was admitted on 8/6/23 for ZAK on CKD4 and metabolic acidosis. Interventional Radiology has been consulted for TDC placement for HD access. Pt was previously on HD 1 year ago but reports her kidneys "woke up" and her TDC was removed. Her WBC is 12.64, last set of blood cultures on 8/6/23 are NGTD. The pt is hemodynamically stable.     Review of Systems   Constitutional: Negative.    Respiratory: Negative.     Cardiovascular: Negative.    Gastrointestinal: Negative.    Genitourinary: Negative.    Musculoskeletal: Negative.        Scheduled Meds:   anastrozole  1 mg Oral Daily    apixaban  5 mg Oral BID    atorvastatin  80 mg Oral Daily    carvediloL  3.125 mg Oral BID    [START ON 8/8/2023] ergocalciferol  50,000 Units Oral Q7 Days    insulin detemir U-100  8 Units Subcutaneous QHS    levothyroxine  50 mcg Oral Before breakfast    methocarbamoL  1,000 mg Oral TID    oxybutynin  10 mg Oral Daily    traZODone  100 mg Oral QHS     Continuous Infusions:   sodium bicarbonate 150 mEq in dextrose 5 % (D5W) 1,000 mL infusion       PRN Meds:dextrose 10%, dextrose 10%, glucagon (human recombinant), glucose, glucose, hydrALAZINE, HYDROcodone-acetaminophen, insulin aspart U-100, ondansetron    Review of patient's allergies indicates:  No Known Allergies    Past Medical History:   Diagnosis Date    Cervicogenic migraine     Chronic pain     CKD (chronic kidney disease) stage 4, GFR 15-29 ml/min     [facility]    CKD (chronic kidney disease) stage 4, GFR 15-29 " ml/min     Diabetes mellitus     Long term use of Insulin, Diabetic Neuropathy    Dialysis patient 1/21/2022    Fibromyalgia     Hydronephrosis     Hyperlipidemia     Hypertension 12/12/2012    Hypothyroidism 12/12/2012    ARON (iron deficiency anemia)     Insomnia     Levoscoliosis     Malignant neoplasm of upper-outer quadrant of left breast in female, estrogen receptor positive     Metabolic acidosis     Mobility impaired     Nuclear sclerosis - Both Eyes 3/24/2014    VIJAYA (obstructive sleep apnea)     Osteopenia     Pulmonary nodule     Recurrent UTI     Renal manifestation of secondary diabetes mellitus     Secondary hyperparathyroidism, renal     Urinary retention      Past Surgical History:   Procedure Laterality Date    BIOPSY OF URETER Left 2/18/2022    Procedure: BIOPSY, URETER;  Surgeon: Ronel Whittington MD;  [facility];  Service: Urology;  Laterality: Left;    BREAST BIOPSY Right     benign    CHOLECYSTECTOMY      COLONOSCOPY N/A 1/13/2017    Procedure: COLONOSCOPY;  Surgeon: Morris Wiseman MD;  Location: [facility] ENDO (4TH FLR);  Service: Endoscopy;  Laterality: N/A;  Renal pt Nephrology advised to avoid phosphate preps    CYSTOSCOPY N/A 10/8/2018    Procedure: CYSTOSCOPY;  Surgeon: Ronel Whittington MD;  [facility];  Service: Urology;  Laterality: N/A;  45 min    CYSTOSCOPY N/A 3/25/2019    Procedure: CYSTOSCOPY;  Surgeon: Ronel Whittington MD;  [facility];  Service: Urology;  Laterality: N/A;  45 min    CYSTOSCOPY N/A 8/26/2019    Procedure: CYSTOSCOPY;  Surgeon: Ronel Whittington MD;  [facility];  Service: Urology;  Laterality: N/A;  45 min    CYSTOSCOPY N/A 7/2/2021    Procedure: CYSTOSCOPY;  Surgeon: Ronel Whittington MD;  [facility];  Service: Urology;  Laterality: N/A;    CYSTOSCOPY  12/23/2021    Procedure: CYSTOSCOPY;  Surgeon: Ronel Whittington MD;  [facility];  Service:  Urology;;    CYSTOSCOPY  2/18/2022    Procedure: CYSTOSCOPY;  Surgeon: Ronel Whittington MD;  Location: [facility] OR [address] [place] [address];  Service: Urology;;    CYSTOSCOPY W/ URETERAL STENT PLACEMENT Left 5/15/2021    Procedure: CYSTOSCOPY, WITH URETERAL STENT INSERTION;  Surgeon: Levy Sánchez Jr., MD;  Location: [facility] OR [address] [place] [address];  Service: Urology;  Laterality: Left;    CYSTOSCOPY WITH BIOPSY OF BLADDER N/A 1/27/2020    Procedure: CYSTOSCOPY, WITH BLADDER BIOPSY, WITH FULGURATION IF INDICATED;  Surgeon: Ronel Whittington MD;  Location: [facility] OR [address] [place] [address];  Service: Urology;  Laterality: N/A;    DILATION AND CURETTAGE OF UTERUS      GALLBLADDER SURGERY  2006    HYSTERECTOMY      INJECTION FOR SENTINEL NODE IDENTIFICATION Left 8/1/2019    Procedure: INJECTION, FOR SENTINEL NODE IDENTIFICATION;  Surgeon: Samia Fulton MD;  Location: [address];  Service: General;  Laterality: Left;    INJECTION OF BOTULINUM TOXIN TYPE A N/A 10/8/2018    Procedure: INJECTION, BOTULINUM TOXIN, TYPE A 300 UNITS;  Surgeon: Ronel Whittington MD;  Location: [address];  Service: Urology;  Laterality: N/A;    INJECTION OF BOTULINUM TOXIN TYPE A N/A 3/25/2019    Procedure: INJECTION, BOTULINUM TOXIN, TYPE A 300 UNITS;  Surgeon: Ronel Whittington MD;  Location: [address];  Service: Urology;  Laterality: N/A;    INJECTION OF BOTULINUM TOXIN TYPE A N/A 8/26/2019    Procedure: INJECTION, BOTULINUM TOXIN, TYPE A 300 UNITS;  Surgeon: Ronel Whittington MD;  Location: [facility] OR [address] [place] [address];  Service: Urology;  Laterality: N/A;    MASTECTOMY Left 8/1/2019    Procedure: MASTECTOMY 23 hour stay;  Surgeon: Samia Fulton MD;  Location: [facility] OR [address] [place] [address];  Service: General;  Laterality: Left;    MEDIPORT REMOVAL Right 6/24/2022    Procedure: REMOVAL, CATHETER, CENTRAL VENOUS, TUNNELED, WITH PORT;  Surgeon: Isauro Anderson MD;  Location: [facility] CATH LAB;  Service: Radiology;  Laterality: Right;    OVARIAN CYST SURGERY  1985    REPLACEMENT  OF STENT Left 12/23/2021    Procedure: REPLACEMENT, STENT;  Surgeon: Ronel Whittington MD;  Location: [facility] OR 1ST FLR;  Service: Urology;  Laterality: Left;    REPLACEMENT OF STENT Left 2/18/2022    Procedure: REPLACEMENT, STENT;  Surgeon: Ronel Whittington MD;  Location: [facility] OR 1ST FLR;  Service: Urology;  Laterality: Left;    RETROGRADE PYELOGRAPHY Left 2/18/2022    Procedure: PYELOGRAM, RETROGRADE;  Surgeon: Ronel Whittington MD;  Location: [facility] OR 1ST FLR;  Service: Urology;  Laterality: Left;    SENTINEL LYMPH NODE BIOPSY Left 8/1/2019    Procedure: BIOPSY, LYMPH NODE, SENTINEL;  Surgeon: Smaia Fulton MD;  Location: [facility] OR 2ND FLR;  Service: General;  Laterality: Left;    spt placement      TONSILLECTOMY, ADENOIDECTOMY      TOTAL ABDOMINAL HYSTERECTOMY W/ BILATERAL SALPINGOOPHORECTOMY  1985    URETEROSCOPY Left 2/18/2022    Procedure: URETEROSCOPY;  Surgeon: Ronel Whittington MD;  [facility];  Service: Urology;  Laterality: Left;     Family History   Problem Relation Age of Onset    Diabetes Sister     Kidney disease Sister         CKD III    ALS Mother         d.    Cancer Maternal Grandmother         d. colon    Cancer Paternal Grandfather         d. lung    Scoliosis Brother         increased pain    Prostate cancer Brother         cured s/p surgery    Cancer Brother         rare cancer that got into the bones    Diabetes Maternal Aunt     Kidney disease Maternal Aunt     Diabetes Maternal Uncle     Amblyopia Neg Hx     Blindness Neg Hx     Cataracts Neg Hx     Glaucoma Neg Hx     Macular degeneration Neg Hx     Retinal detachment Neg Hx     Strabismus Neg Hx      Social History     Tobacco Use    Smoking status: Never    Smokeless tobacco: Never   Substance Use Topics    Alcohol use: No     Alcohol/week: 0.0 standard drinks of alcohol    Drug use: No       OBJECTIVE:     Vital Signs (Most Recent)  Temp: 98.2 °F (36.8 °C) (08/07/23 1208)  Pulse: 74 (08/07/23  "1307)  Resp: 18 (08/07/23 1208)  BP: (!) 177/74 (08/07/23 1307)  SpO2: 100 % (08/07/23 1208)    Physical Exam:  Physical Exam  Constitutional:       General: She is not in acute distress.     Appearance: Normal appearance. She is obese. She is not ill-appearing.   HENT:      Head: Normocephalic and atraumatic.   Eyes:      Extraocular Movements: Extraocular movements intact.      Conjunctiva/sclera: Conjunctivae normal.      Pupils: Pupils are equal, round, and reactive to light.   Pulmonary:      Effort: Pulmonary effort is normal. No respiratory distress.   Abdominal:      General: Abdomen is flat.   Skin:     General: Skin is warm and dry.      Coloration: Skin is not jaundiced.   Neurological:      General: No focal deficit present.      Mental Status: She is alert and oriented to person, place, and time.   Psychiatric:         Mood and Affect: Mood normal.         Behavior: Behavior normal.         Thought Content: Thought content normal.         Judgment: Judgment normal.         Laboratory  I have reviewed all pertinent lab results within the past 24 hours.  CBC:   Recent Labs   Lab 08/07/23  0640 08/07/23  0846   WBC 12.64  --    RBC 2.90*  --    HGB 7.9*  --    HCT 24.9* 23*     --    MCV 86  --    MCH 27.2  --    MCHC 31.7*  --      BMP:   Recent Labs   Lab 08/07/23  0640 08/07/23  1126   * 139*   * 137   K 3.1* 3.5    111*   CO2 8* 13*   BUN 63* 65*   CREATININE 4.4* 4.6*   CALCIUM 7.8* 7.9*   MG 1.6  --      CMP:   Recent Labs   Lab 08/07/23  1126   *   CALCIUM 7.9*   ALBUMIN 2.2*   PROT 5.3*      K 3.5   CO2 13*   *   BUN 65*   CREATININE 4.6*   ALKPHOS 89   ALT 10   AST 9*   BILITOT 0.2     LFTs:   Recent Labs   Lab 08/07/23  1126   ALT 10   AST 9*   ALKPHOS 89   BILITOT 0.2   PROT 5.3*   ALBUMIN 2.2*     Coagulation: No results for input(s): "LABPROT", "INR", "APTT" in the last 168 hours.  Microbiology Results (last 7 days)       Procedure Component Value " Units Date/Time    Blood culture #2 **CANNOT BE ORDERED STAT** [[id number]] Collected: 08/06/23 1740    Order Status: Completed Specimen: Blood from Peripheral, Antecubital, Right Updated: 08/07/23 0115     Blood Culture, Routine No Growth to date    Blood culture #1 **CANNOT BE ORDERED STAT** [[id number]] Collected: 08/06/23 1728    Order Status: Completed Specimen: Blood from Peripheral, Hand, Right Updated: 08/07/23 0115     Blood Culture, Routine No Growth to date    Urine culture [[id number]] Collected: 08/06/23 1616    Order Status: No result Specimen: Urine Updated: 08/06/23 1705            ASA/Mallampati  ASA: 3  Mallampati: 2    Imaging:  Recent imaging studies reviewed.     ASSESSMENT/PLAN:     Assessment:  73 y.o. female with a PMHx of ZAK on CKD4, Afib on eliquis who has been referred to IR for TDC placement for HD access. The procedure was discussed in great detail with the patient including thorough explanations of the potential risks and benefits of TDC placement. Risks include bleeding at the puncture site, infection, catheter related thrombus, catheter dysfunction, and central vein stenosis. The patient is a candidate for TDC placement under moderate sedation. Plan discussed with ordering physician.The pt verbalized understanding of the plan and would like to proceed.    Plan:  Will proceed with TDC placement under moderate sedation on 8/8/23.   Please keep pt NPO starting at midnight on 8/8/23.   Anticoagulation history reviewed. Pt on eliquis, no need to hold  Coagulation labs reviewed.  Thank you for the consult. Please contact with questions via [employer] secure engageSimply or spectra    Lucero Ballard PA-C  Interventional Radiology  Spectra: [id number]

## 2023-08-07 NOTE — SUBJECTIVE & OBJECTIVE
Interval History: NAOE. Good urine output with 1.65L. Discussed the likelihood of restarting HD. Pt has no other complaints otherwise.    Review of Systems  Objective:     Vital Signs (Most Recent):  Temp: 98.2 °F (36.8 °C) (08/07/23 1208)  Pulse: 73 (08/07/23 1208)  Resp: 18 (08/07/23 1208)  BP: (!) 192/80 (08/07/23 1208)  SpO2: 100 % (08/07/23 1208) Vital Signs (24h Range):  Temp:  [97.9 °F (36.6 °C)-98.8 °F (37.1 °C)] 98.2 °F (36.8 °C)  Pulse:  [62-98] 73  Resp:  [15-24] 18  SpO2:  [97 %-100 %] 100 %  BP: (145-217)/(61-96) 192/80     Weight: 113.9 kg (251 lb)  Body mass index is 38.16 kg/m².    Intake/Output Summary (Last 24 hours) at 8/7/2023 1254  Last data filed at 8/7/2023 0937  Gross per 24 hour   Intake 1977.23 ml   Output 1650 ml   Net 327.23 ml         Physical Exam  Vitals and nursing note reviewed.   Constitutional:       General: She is not in acute distress.     Appearance: Normal appearance.   Cardiovascular:      Rate and Rhythm: Normal rate and regular rhythm.      Pulses: Normal pulses.      Heart sounds: Murmur (holosystolic murmur) heard.      Comments: Unable to feel distal pulses due to volume overload status. Not able to appreciate JVD  Pulmonary:      Effort: Pulmonary effort is normal.      Breath sounds: Normal breath sounds.   Abdominal:      General: Bowel sounds are normal.      Palpations: Abdomen is soft.      Tenderness: There is no abdominal tenderness. There is left CVA tenderness. There is no right CVA tenderness.      Comments: Suprapubic catheter in place   Musculoskeletal:      Right lower leg: Edema present.      Left lower leg: Edema present.      Comments: Pitting edema bilaterally upto hip   Skin:     General: Skin is warm.      Coloration: Skin is pale.   Neurological:      General: No focal deficit present.      Mental Status: She is alert. Mental status is at baseline.   Psychiatric:         Mood and Affect: Mood normal.         Behavior: Behavior normal.              Significant Labs: All pertinent labs within the past 24 hours have been reviewed.    Significant Imaging: I have reviewed all pertinent imaging results/findings within the past 24 hours.

## 2023-08-07 NOTE — SUBJECTIVE & OBJECTIVE
Past Medical History:   Diagnosis Date    Cervicogenic migraine     Chronic pain     CKD (chronic kidney disease) stage 4, GFR 15-29 ml/min     Dr Piedadmoody    CKD (chronic kidney disease) stage 4, GFR 15-29 ml/min     Diabetes mellitus     Long term use of Insulin, Diabetic Neuropathy    Dialysis patient 1/21/2022    Fibromyalgia     Hydronephrosis     Hyperlipidemia     Hypertension 12/12/2012    Hypothyroidism 12/12/2012    ARON (iron deficiency anemia)     Insomnia     Levoscoliosis     Malignant neoplasm of upper-outer quadrant of left breast in female, estrogen receptor positive     Metabolic acidosis     Mobility impaired     Nuclear sclerosis - Both Eyes 3/24/2014    VIJAYA (obstructive sleep apnea)     Osteopenia     Pulmonary nodule     Recurrent UTI     Renal manifestation of secondary diabetes mellitus     Secondary hyperparathyroidism, renal     Urinary retention        Past Surgical History:   Procedure Laterality Date    BIOPSY OF URETER Left 2/18/2022    Procedure: BIOPSY, URETER;  Surgeon: Ronel Whittington MD;  [facility];  Service: Urology;  Laterality: Left;    BREAST BIOPSY Right     benign    CHOLECYSTECTOMY      COLONOSCOPY N/A 1/13/2017    Procedure: COLONOSCOPY;  Surgeon: Morris Wiseman MD;  Location: [facility] (4TH FLR);  Service: Endoscopy;  Laterality: N/A;  Renal pt Nephrology advised to avoid phosphate preps    CYSTOSCOPY N/A 10/8/2018    Procedure: CYSTOSCOPY;  Surgeon: Ronel Whittington MD;  [facility];  Service: Urology;  Laterality: N/A;  45 min    CYSTOSCOPY N/A 3/25/2019    Procedure: CYSTOSCOPY;  Surgeon: Ronel Whittington MD;  [facility];  Service: Urology;  Laterality: N/A;  45 min    CYSTOSCOPY N/A 8/26/2019    Procedure: CYSTOSCOPY;  Surgeon: Ronel Whittington MD;  [facility];  Service: Urology;  Laterality: N/A;  45 min    CYSTOSCOPY N/A 7/2/2021    Procedure: CYSTOSCOPY;  Surgeon: Ronel FRAZIER  MD Pk;  [facility];  Service: Urology;  Laterality: N/A;    CYSTOSCOPY  12/23/2021    Procedure: CYSTOSCOPY;  Surgeon: Ronel Whittington MD;  [facility];  Service: Urology;;    CYSTOSCOPY  2/18/2022    Procedure: CYSTOSCOPY;  Surgeon: Ronel Whittington MD;  [facility];  Service: Urology;;    CYSTOSCOPY W/ URETERAL STENT PLACEMENT Left 5/15/2021    Procedure: CYSTOSCOPY, WITH URETERAL STENT INSERTION;  Surgeon: Levy Sánchez Jr., MD;  [facility];  Service: Urology;  Laterality: Left;    CYSTOSCOPY WITH BIOPSY OF BLADDER N/A 1/27/2020    Procedure: CYSTOSCOPY, WITH BLADDER BIOPSY, WITH FULGURATION IF INDICATED;  Surgeon: Ronel Whittington MD;  Location: [facility] OR [address] [place] [address];  Service: Urology;  Laterality: N/A;    DILATION AND CURETTAGE OF UTERUS      GALLBLADDER SURGERY  2006    HYSTERECTOMY      INJECTION FOR SENTINEL NODE IDENTIFICATION Left 8/1/2019    Procedure: INJECTION, FOR SENTINEL NODE IDENTIFICATION;  Surgeon: Samia Fulton MD;  Location: [facility] OR [address] [place] [address];  Service: General;  Laterality: Left;    INJECTION OF BOTULINUM TOXIN TYPE A N/A 10/8/2018    Procedure: INJECTION, BOTULINUM TOXIN, TYPE A 300 UNITS;  Surgeon: Ronel Whittington MD;  Location: [facility] OR [address] [place] [address];  Service: Urology;  Laterality: N/A;    INJECTION OF BOTULINUM TOXIN TYPE A N/A 3/25/2019    Procedure: INJECTION, BOTULINUM TOXIN, TYPE A 300 UNITS;  Surgeon: Ronel Whittington MD;  Location: [facility] OR [address] [place] [address];  Service: Urology;  Laterality: N/A;    INJECTION OF BOTULINUM TOXIN TYPE A N/A 8/26/2019    Procedure: INJECTION, BOTULINUM TOXIN, TYPE A 300 UNITS;  Surgeon: Ronel Whittington MD;  [facility];  Service: Urology;  Laterality: N/A;    MASTECTOMY Left 8/1/2019    Procedure: MASTECTOMY 23 hour stay;  Surgeon: Samia Fulton MD;  Location: [facility] OR 2ND FLR;  Service: General;  Laterality: Left;    MEDIPORT REMOVAL Right 6/24/2022     Procedure: REMOVAL, CATHETER, CENTRAL VENOUS, TUNNELED, WITH PORT;  Surgeon: Isauro Anderson MD;  Location: [facility] CATH LAB;  Service: Radiology;  Laterality: Right;    OVARIAN CYST SURGERY  1985    REPLACEMENT OF STENT Left 12/23/2021    Procedure: REPLACEMENT, STENT;  Surgeon: Ronel Whittington MD;  Location: [facility] OR [facility] FLR;  Service: Urology;  Laterality: Left;    REPLACEMENT OF STENT Left 2/18/2022    Procedure: REPLACEMENT, STENT;  Surgeon: Ronel Whittington MD;  Location: [facility] OR 1ST FLR;  Service: Urology;  Laterality: Left;    RETROGRADE PYELOGRAPHY Left 2/18/2022    Procedure: PYELOGRAM, RETROGRADE;  Surgeon: Ronel Whittington MD;  [facility];  Service: Urology;  Laterality: Left;    SENTINEL LYMPH NODE BIOPSY Left 8/1/2019    Procedure: BIOPSY, LYMPH NODE, SENTINEL;  Surgeon: Samia Fulton MD;  [facility];  Service: General;  Laterality: Left;    spt placement      TONSILLECTOMY, ADENOIDECTOMY      TOTAL ABDOMINAL HYSTERECTOMY W/ BILATERAL SALPINGOOPHORECTOMY  1985    URETEROSCOPY Left 2/18/2022    Procedure: URETEROSCOPY;  Surgeon: Ronel Whittington MD;  [facility];  Service: Urology;  Laterality: Left;       Review of patient's allergies indicates:  No Known Allergies  Current Facility-Administered Medications   Medication Frequency    anastrozole tablet 1 mg Daily    apixaban tablet 5 mg BID    atorvastatin tablet 80 mg Daily    carvediloL tablet 3.125 mg BID    dextrose 10% bolus 125 mL 125 mL PRN    dextrose 10% bolus 250 mL 250 mL PRN    [START ON 8/8/2023] ergocalciferol capsule 50,000 Units Q7 Days    glucagon (human recombinant) injection 1 mg PRN    glucose chewable tablet 16 g PRN    glucose chewable tablet 24 g PRN    hydrALAZINE injection 10 mg Q8H PRN    HYDROcodone-acetaminophen  mg per tablet 1 tablet Q6H PRN    insulin aspart U-100 pen 0-5 Units QID (AC + HS) PRN    insulin detemir U-100 (Levemir) pen 8 Units QHS     levothyroxine tablet 50 mcg Before breakfast    methocarbamoL tablet 1,000 mg TID    ondansetron disintegrating tablet 8 mg Q12H PRN    oxybutynin 24 hr tablet 10 mg Daily    sodium bicarbonate 150 mEq in dextrose 5 % (D5W) 1,000 mL infusion Continuous    traZODone tablet 100 mg QHS     Family History       Problem Relation (Age of Onset)    ALS Mother    Cancer Maternal Grandmother, Paternal Grandfather, Brother    Diabetes Sister, Maternal Aunt, Maternal Uncle    Kidney disease Sister, Maternal Aunt    Prostate cancer Brother    Scoliosis Brother          Tobacco Use    Smoking status: Never    Smokeless tobacco: Never   Substance and Sexual Activity    Alcohol use: No     Alcohol/week: 0.0 standard drinks of alcohol    Drug use: No    Sexual activity: Not Currently     Birth control/protection: Abstinence     Review of Systems   Constitutional:  Positive for appetite change and fatigue.   Respiratory:  Negative for cough and shortness of breath.    Cardiovascular:  Positive for leg swelling (chronic lower extremity edema which she reports is stable).   Gastrointestinal:  Negative for abdominal pain.   Genitourinary:         Suprapubic catheter   Neurological:  Negative for headaches.   Psychiatric/Behavioral:  Negative for behavioral problems.      Objective:     Vital Signs (Most Recent):  Temp: 98.2 °F (36.8 °C) (08/07/23 1208)  Pulse: 74 (08/07/23 1307)  Resp: 18 (08/07/23 1208)  BP: (!) 177/74 (08/07/23 1307)  SpO2: 100 % (08/07/23 1208) Vital Signs (24h Range):  Temp:  [97.9 °F (36.6 °C)-98.8 °F (37.1 °C)] 98.2 °F (36.8 °C)  Pulse:  [62-98] 74  Resp:  [15-24] 18  SpO2:  [97 %-100 %] 100 %  BP: (145-217)/(61-96) 177/74     Weight: 113.9 kg (251 lb) (08/07/23 0937)  Body mass index is 38.16 kg/m².  Body surface area is 2.34 meters squared.    I/O last 3 completed shifts:  In: 1737.2 [P.O.:240; IV Piggyback:1497.2]  Out: 1650 [Urine:1450; Emesis/NG output:200]     Physical Exam  Constitutional:       General:  She is not in acute distress.     Appearance: Normal appearance.   Cardiovascular:      Rate and Rhythm: Normal rate.      Pulses: Normal pulses.      Heart sounds: Murmur (holosystolic murmur) heard.   Pulmonary:      Effort: Pulmonary effort is normal.      Breath sounds: Normal breath sounds.   Abdominal:      General: Bowel sounds are normal.      Tenderness: There is no abdominal tenderness.      Comments: Suprapubic catheter in place   Musculoskeletal:      Right lower leg: Edema present.      Left lower leg: Edema present.      Comments: Pitting edema bilaterally upto hip   Skin:     General: Skin is warm and dry.   Neurological:      General: No focal deficit present.      Mental Status: She is alert.   Psychiatric:         Mood and Affect: Mood normal.          Significant Labs:  CBC:   Recent Labs   Lab 08/07/23  0640 08/07/23  0846   WBC 12.64  --    RBC 2.90*  --    HGB 7.9*  --    HCT 24.9* 23*     --    MCV 86  --    MCH 27.2  --    MCHC 31.7*  --      CMP:   Recent Labs   Lab 08/07/23  1126   *   CALCIUM 7.9*   ALBUMIN 2.2*   PROT 5.3*      K 3.5   CO2 13*   *   BUN 65*   CREATININE 4.6*   ALKPHOS 89   ALT 10   AST 9*   BILITOT 0.2     All labs within the past 24 hours have been reviewed.    Significant Imaging:  Labs: Reviewed

## 2023-08-07 NOTE — PROGRESS NOTES
"[facility] - Cardiology [facility] Medicine  Progress Note    Patient Name: Cecile Bowen  MRN: [id number]  Patient Class: IP- Inpatient   Admission Date: 8/6/2023  Length of Stay: 1 days  Attending Physician: Meagan Templeton*  Primary Care Provider: Lurdes Navarro MD        Subjective:     Principal Problem:Acute kidney injury superimposed on CKD        HPI:  Cecile Bowen is 73 y.o. with T2DM on insulin, Afib on Eliquis, neurogenic bladder with suprapubic catheter, CKD4 previously on dialysis, HLD, hypothyroidism, migraine, chronic pain who presents to [facility] ED for critical labs from her PCP visit. PCP instructed pt on Friday 8/04 to present to ED for bicarb less than 5 and ZAK. She felt like she was fine and waited till Sunday 08/06 to present to ED. She reports "feeling low physically" and that's what prompted her to come in. Reports good hydration but poor oral nutrition; not much food in the house. She has a suprapubic catheter that was placed 7 years ago, and was recently changed 2 wks ago, per pt. Denies smoking use or hx, alcohol use or other use of illict drugs. Reports chronic constipation, chronic back pain, intermittent productive cough, weakness, chronic bilateral floaters and lightheadedness. She is wheelchair bound because she reports no muscle tone in legs. Reports urinating well. Cannot account for dysuria as pt has catheter in place, but does report recently foul smelling urine. Denies CP, SOB, unexpected weight loss, fever, abdominal pain, diarrhea. Denies any recent changes to medications or any sick contacts. Denies recent falls; last fall was one year w/o head trauma.     Was previously on dialysis for about 6 months about a year ago, but was told "her kidneys woke up" so she was able to come off of it.  Reports that care is in the hands of her sister and feels that she does not receive much care.  She also reports not being very hungry usually because she tends to " get nauseated by food.  See CM/SW and nursing notes for more info on housing situation.       Overview/Hospital Course:  Admitted to [facility] Medicine 4 for further evaluation and treatment.  Diuresed with Lasix 80 IV and given sodium bicarb gtt, which did help improve her bicarb and pH.  However, Nephrology felt her kidney function warranted resuming iHD. Currently volume overloaded and on fluid restriction.       Interval History: NAOE. Good urine output with 1.65L. Discussed the likelihood of restarting HD. Pt has no other complaints otherwise.    Review of Systems  Objective:     Vital Signs (Most Recent):  Temp: 98.2 °F (36.8 °C) (08/07/23 1208)  Pulse: 73 (08/07/23 1208)  Resp: 18 (08/07/23 1208)  BP: (!) 192/80 (08/07/23 1208)  SpO2: 100 % (08/07/23 1208) Vital Signs (24h Range):  Temp:  [97.9 °F (36.6 °C)-98.8 °F (37.1 °C)] 98.2 °F (36.8 °C)  Pulse:  [62-98] 73  Resp:  [15-24] 18  SpO2:  [97 %-100 %] 100 %  BP: (145-217)/(61-96) 192/80     Weight: 113.9 kg (251 lb)  Body mass index is 38.16 kg/m².    Intake/Output Summary (Last 24 hours) at 8/7/2023 1254  Last data filed at 8/7/2023 0937  Gross per 24 hour   Intake 1977.23 ml   Output 1650 ml   Net 327.23 ml         Physical Exam  Vitals and nursing note reviewed.   Constitutional:       General: She is not in acute distress.     Appearance: Normal appearance.   Cardiovascular:      Rate and Rhythm: Normal rate and regular rhythm.      Pulses: Normal pulses.      Heart sounds: Murmur (holosystolic murmur) heard.      Comments: Unable to feel distal pulses due to volume overload status. Not able to appreciate JVD  Pulmonary:      Effort: Pulmonary effort is normal.      Breath sounds: Normal breath sounds.   Abdominal:      General: Bowel sounds are normal.      Palpations: Abdomen is soft.      Tenderness: There is no abdominal tenderness. There is left CVA tenderness. There is no right CVA tenderness.      Comments: Suprapubic catheter in place    Musculoskeletal:      Right lower leg: Edema present.      Left lower leg: Edema present.      Comments: Pitting edema bilaterally upto hip   Skin:     General: Skin is warm.      Coloration: Skin is pale.   Neurological:      General: No focal deficit present.      Mental Status: She is alert. Mental status is at baseline.   Psychiatric:         Mood and Affect: Mood normal.         Behavior: Behavior normal.             Significant Labs: All pertinent labs within the past 24 hours have been reviewed.    Significant Imaging: I have reviewed all pertinent imaging results/findings within the past 24 hours.    Assessment/Plan:      * Acute kidney injury superimposed on CKD  Normal anion gap metabolic acidosis    Patient with acute kidney injury/acute renal failure likely due to nonadherence to dialysis and volume overload that is contributing the excessive load on kidneys. ZAK is currently improving. Baseline creatinine 2.2 - Labs reviewed- Renal function/electrolytes with Estimated Creatinine Clearance: 14.9 mL/min (A) (based on SCr of 4.6 mg/dL (H)). according to latest data. Monitor urine output and serial BMP and adjust therapy as needed. Avoid nephrotoxins and renally dose meds for GFR listed above.    - Nephro recommends sodium bicarb gtt and IV lasix 80, with anticipating of iHD; bicarb improving, currently 13  - strict I/O  - Renal diet  - holding home Duloxetine and Voltaren gel due to ZAK   - Current Cr 4.6  - replenishing K as needed  - VBG 7.213 / 27.4 / 42 / 11.0 --> primary metabolic acidosis with compensatory respiratory alkalosis   - acidosis improving from 7.057 --> 7.213  - Per echo results from 08/07/23 EF 60% with normal systolic and estimated diastolic function, normal CVP   - indicating extravascular volume overload      Bacteria in urine  No  sxs of dysuria, increased frequency. Afebrile during admission.    - WCTM   - UCx: NGTD       Pressure injury of buttock, stage 1  - wound care  treating      Uncomplicated opioid dependence  - continue home [place] for unbearable chronic back pain         Hypertension associated with diabetes  Patient's FSGs are controlled on current medication regimen.  Last A1c reviewed-   Lab Results   Component Value Date    HGBA1C 6.1 (H) 08/04/2023     Most recent fingerstick glucose reviewed-   Recent Labs   Lab 08/06/23 2038 08/07/23  0814   POCTGLUCOSE 176* 151*     Current correctional scale  Low  Maintain anti-hyperglycemic dose as follows-   Antihyperglycemics (From admission, onward)      Start     Stop Route Frequency Ordered    08/07/23 0835  insulin aspart U-100 pen 0-5 Units         -- SubQ Before meals & nightly PRN 08/07/23 0819 08/06/23 2100  insulin detemir U-100 (Levemir) pen 8 Units         -- SubQ Nightly 08/06/23 1920          Hold Oral hypoglycemics while patient is in the hospital.      - holding home clonidine  - initiated COREG 3.125 mg BID, will titrate as needed  - PRN hydralazine for SBP >180      BP Readings from Last 1 Encounters:   08/07/23 (!) 177/74         A-fib  - home dose Eliquis was 2.5mg BID --> titrated to 5mg BID for prevention of systemic embolism    Type 2 diabetes mellitus with stage 4 chronic kidney disease, with long-term current use of insulin  Patient's FSGs are controlled on current medication regimen.  Last A1c reviewed-   Lab Results   Component Value Date    HGBA1C 6.1 (H) 08/04/2023     Most recent fingerstick glucose reviewed-   Recent Labs   Lab 08/06/23 2038 08/07/23  0814   POCTGLUCOSE 176* 151*     Current correctional scale  Low  Maintain anti-hyperglycemic dose as follows-   Antihyperglycemics (From admission, onward)      Start     Stop Route Frequency Ordered    08/07/23 0835  insulin aspart U-100 pen 0-5 Units         -- SubQ Before meals & nightly PRN 08/07/23 0819    08/06/23 2100  insulin detemir U-100 (Levemir) pen 8 Units         -- SubQ Nightly 08/06/23 1920          Hold Oral hypoglycemics while  patient is in the hospital. Home dose includes 15 units lantus, but following IP management.     PLAN:  - Goal blood glucose range while admitted: 140-180 per the NICE-SUGAR trial and American Diabetes Association guidelines  - Detemir 8 units qhs  - SSI with POCT accuchecks ACHS and Diabetic diet 1500 duyen with fluid restriction 1500mL  - Diabetic counseling and education (eg nutrition, insulin) to be given prior to discharge      Cervicogenic migraine  - Discontinued sumatriptan due to c/f elevated BP  - WCTM      Suprapubic catheter  Neurogenic Bladder    Placed 7 yrs ago. Last changed 2 wks ago.       VIJAYA (obstructive sleep apnea)  - CPAP ordered      Hyperlipidemia associated with type 2 diabetes mellitus  - continue home atorvastatin      Hypothyroidism  - continue home synthroid        VTE Risk Mitigation (From admission, onward)           Ordered     apixaban tablet 5 mg  2 times daily         08/07/23 0941                    Discharge Planning   FLORENTINO: 8/10/2023     Code Status: Full Code   Is the patient medically ready for discharge?: No    Reason for patient still in hospital (select all that apply): Patient trending condition, Treatment and Consult recommendations  Discharge Plan A: Home Health, Skilled Nursing Facility   Discharge Delays: None known at this time              Marge Jennings MD  Department of Hospital Medicine   [facility] - Cardiology Stepdown

## 2023-08-07 NOTE — CONSULTS
"[facility] - [facility] Step[facility]  Nephrology  Consult Note    Patient Name: Cecile Bowen  MRN: [id number]  Admission Date: 8/6/2023  Hospital Length of Stay: 1 days  Attending Provider: Meagan Templeton*   Primary Care Physician: Lurdes Navarro MD  Principal Problem:Acute kidney injury superimposed on CKD    Inpatient consult to Nephrology  Consult performed by: Ines Romano MD  Consult ordered by: Junior Tobin MD        Subjective:     HPI: 73 year old woman with CKD4 (BLCr 2.2), T2DM, Afib, neurogenic bladder with suprapubic catheter, hypothyroidism, HLD presented to the ED after critical labs were identified by her PCP (Bicarb < 5). She was told to present to the ED on Friday but did not present until Sunday because she "felt ok". Her bicarb was 7 on admission, Cr was 4.6. Nephrology was consulted for ZAK on CKD4. Of note, patient was on dialysis for about 6 months about a year ago, but was told "her kidneys woke up" so she was able to come off of it. She was initially against restarting dialysis unless absolutely necessary but today she spoke with her sister and is open to starting HD.      Past Medical History:   Diagnosis Date    Cervicogenic migraine     Chronic pain     CKD (chronic kidney disease) stage 4, GFR 15-29 ml/min     Maribel Lakahni    CKD (chronic kidney disease) stage 4, GFR 15-29 ml/min     Diabetes mellitus     Long term use of Insulin, Diabetic Neuropathy    Dialysis patient 1/21/2022    Fibromyalgia     Hydronephrosis     Hyperlipidemia     Hypertension 12/12/2012    Hypothyroidism 12/12/2012    ARON (iron deficiency anemia)     Insomnia     Levoscoliosis     Malignant neoplasm of upper-outer quadrant of left breast in female, estrogen receptor positive     Metabolic acidosis     Mobility impaired     Nuclear sclerosis - Both Eyes 3/24/2014    VIJAYA (obstructive sleep apnea)     Osteopenia     Pulmonary nodule     Recurrent UTI     Renal manifestation of secondary " diabetes mellitus     Secondary hyperparathyroidism, renal     Urinary retention        Past Surgical History:   Procedure Laterality Date    BIOPSY OF URETER Left 2/18/2022    Procedure: BIOPSY, URETER;  Surgeon: Ronel Whittington MD;  [facility];  Service: Urology;  Laterality: Left;    BREAST BIOPSY Right     benign    CHOLECYSTECTOMY      COLONOSCOPY N/A 1/13/2017    Procedure: COLONOSCOPY;  Surgeon: Morris Wiseman MD;  Location: [facility] ENDO (4TH FLR);  Service: Endoscopy;  Laterality: N/A;  Renal pt Nephrology advised to avoid phosphate preps    CYSTOSCOPY N/A 10/8/2018    Procedure: CYSTOSCOPY;  Surgeon: Ronel Whittington MD;  [facility];  Service: Urology;  Laterality: N/A;  45 min    CYSTOSCOPY N/A 3/25/2019    Procedure: CYSTOSCOPY;  Surgeon: Ronel Whittington MD;  [facility];  Service: Urology;  Laterality: N/A;  45 min    CYSTOSCOPY N/A 8/26/2019    Procedure: CYSTOSCOPY;  Surgeon: Ronel Whittington MD;  [facility];  Service: Urology;  Laterality: N/A;  45 min    CYSTOSCOPY N/A 7/2/2021    Procedure: CYSTOSCOPY;  Surgeon: Ronel Whittington MD;  [facility];  Service: Urology;  Laterality: N/A;    CYSTOSCOPY  12/23/2021    Procedure: CYSTOSCOPY;  Surgeon: Ronel Whittington MD;  [facility];  Service: Urology;;    CYSTOSCOPY  2/18/2022    Procedure: CYSTOSCOPY;  Surgeon: Ronel Whittington MD;  [facility];  Service: Urology;;    CYSTOSCOPY W/ URETERAL STENT PLACEMENT Left 5/15/2021    Procedure: CYSTOSCOPY, WITH URETERAL STENT INSERTION;  Surgeon: Levy Sánchez Jr., MD;  [facility];  Service: Urology;  Laterality: Left;    CYSTOSCOPY WITH BIOPSY OF BLADDER N/A 1/27/2020    Procedure: CYSTOSCOPY, WITH BLADDER BIOPSY, WITH FULGURATION IF INDICATED;  Surgeon: Ronel Whittington MD;  Location: [facility] OR [address] [place] [address];  Service: Urology;  Laterality: N/A;     DILATION AND CURETTAGE OF UTERUS      GALLBLADDER SURGERY  2006    HYSTERECTOMY      INJECTION FOR SENTINEL NODE IDENTIFICATION Left 8/1/2019    Procedure: INJECTION, FOR SENTINEL NODE IDENTIFICATION;  Surgeon: Samia Fulton MD;  Location: [facility] OR 2ND FLR;  Service: General;  Laterality: Left;    INJECTION OF BOTULINUM TOXIN TYPE A N/A 10/8/2018    Procedure: INJECTION, BOTULINUM TOXIN, TYPE A 300 UNITS;  Surgeon: Ronel Whittington MD;  Location: [facility] OR [address] [place] [address];  Service: Urology;  Laterality: N/A;    INJECTION OF BOTULINUM TOXIN TYPE A N/A 3/25/2019    Procedure: INJECTION, BOTULINUM TOXIN, TYPE A 300 UNITS;  Surgeon: Ronel Whittington MD;  Location: [facility] OR [address] [place] [address];  Service: Urology;  Laterality: N/A;    INJECTION OF BOTULINUM TOXIN TYPE A N/A 8/26/2019    Procedure: INJECTION, BOTULINUM TOXIN, TYPE A 300 UNITS;  Surgeon: Ronel Whittington MD;  Location: [facility] OR [address] [place] [address];  Service: Urology;  Laterality: N/A;    MASTECTOMY Left 8/1/2019    Procedure: MASTECTOMY 23 hour stay;  Surgeon: Samia Fulton MD;  Location: [facility] OR [address] [place] [address];  Service: General;  Laterality: Left;    MEDIPORT REMOVAL Right 6/24/2022    Procedure: REMOVAL, CATHETER, CENTRAL VENOUS, TUNNELED, WITH PORT;  Surgeon: Isauro Anderson MD;  Location: [facility] CATH LAB;  Service: Radiology;  Laterality: Right;    OVARIAN CYST SURGERY  1985    REPLACEMENT OF STENT Left 12/23/2021    Procedure: REPLACEMENT, STENT;  Surgeon: Ronel Whittington MD;  Location: [facility] OR [address] [place] [address];  Service: Urology;  Laterality: Left;    REPLACEMENT OF STENT Left 2/18/2022    Procedure: REPLACEMENT, STENT;  Surgeon: Ronel Whittington MD;  Location: [facility] OR [address] [place] [address];  Service: Urology;  Laterality: Left;    RETROGRADE PYELOGRAPHY Left 2/18/2022    Procedure: PYELOGRAM, RETROGRADE;  Surgeon: Ronel Whittington MD;  Location: [facility] OR [address] [place] [address];  Service: Urology;  Laterality: Left;    SENTINEL LYMPH NODE BIOPSY Left 8/1/2019    Procedure: BIOPSY, LYMPH  NODE, SENTINEL;  Surgeon: Samia Fulton MD;  Location: [facility] OR 2ND [facility];  Service: General;  Laterality: Left;    spt placement      TONSILLECTOMY, ADENOIDECTOMY      TOTAL ABDOMINAL HYSTERECTOMY W/ BILATERAL SALPINGOOPHORECTOMY  1985    URETEROSCOPY Left 2/18/2022    Procedure: URETEROSCOPY;  Surgeon: Ronel Whittington MD;  Location: [facility] OR 1ST [facility];  Service: Urology;  Laterality: Left;       Review of patient's allergies indicates:  No Known Allergies  Current Facility-Administered Medications   Medication Frequency    anastrozole tablet 1 mg Daily    apixaban tablet 5 mg BID    atorvastatin tablet 80 mg Daily    carvediloL tablet 3.125 mg BID    dextrose 10% bolus 125 mL 125 mL PRN    dextrose 10% bolus 250 mL 250 mL PRN    [START ON 8/8/2023] ergocalciferol capsule 50,000 Units Q7 Days    glucagon (human recombinant) injection 1 mg PRN    glucose chewable tablet 16 g PRN    glucose chewable tablet 24 g PRN    hydrALAZINE injection 10 mg Q8H PRN    HYDROcodone-acetaminophen  mg per tablet 1 tablet Q6H PRN    insulin aspart U-100 pen 0-5 Units QID (AC + HS) PRN    insulin detemir U-100 (Levemir) pen 8 Units QHS    levothyroxine tablet 50 mcg Before breakfast    methocarbamoL tablet 1,000 mg TID    ondansetron disintegrating tablet 8 mg Q12H PRN    oxybutynin 24 hr tablet 10 mg Daily    sodium bicarbonate 150 mEq in dextrose 5 % (D5W) 1,000 mL infusion Continuous    traZODone tablet 100 mg QHS     Family History       Problem Relation (Age of Onset)    ALS Mother    Cancer Maternal Grandmother, Paternal Grandfather, Brother    Diabetes Sister, Maternal Aunt, Maternal Uncle    Kidney disease Sister, Maternal Aunt    Prostate cancer Brother    Scoliosis Brother          Tobacco Use    Smoking status: Never    Smokeless tobacco: Never   Substance and Sexual Activity    Alcohol use: No     Alcohol/week: 0.0 standard drinks of alcohol    Drug use: No    Sexual activity: Not Currently     Birth  control/protection: Abstinence     Review of Systems   Constitutional:  Positive for appetite change and fatigue.   Respiratory:  Negative for cough and shortness of breath.    Cardiovascular:  Positive for leg swelling (chronic lower extremity edema which she reports is stable).   Gastrointestinal:  Negative for abdominal pain.   Genitourinary:         Suprapubic catheter   Neurological:  Negative for headaches.   Psychiatric/Behavioral:  Negative for behavioral problems.      Objective:     Vital Signs (Most Recent):  Temp: 98.2 °F (36.8 °C) (08/07/23 1208)  Pulse: 74 (08/07/23 1307)  Resp: 18 (08/07/23 1208)  BP: (!) 177/74 (08/07/23 1307)  SpO2: 100 % (08/07/23 1208) Vital Signs (24h Range):  Temp:  [97.9 °F (36.6 °C)-98.8 °F (37.1 °C)] 98.2 °F (36.8 °C)  Pulse:  [62-98] 74  Resp:  [15-24] 18  SpO2:  [97 %-100 %] 100 %  BP: (145-217)/(61-96) 177/74     Weight: 113.9 kg (251 lb) (08/07/23 0937)  Body mass index is 38.16 kg/m².  Body surface area is 2.34 meters squared.    I/O last 3 completed shifts:  In: 1737.2 [P.O.:240; IV Piggyback:1497.2]  Out: 1650 [Urine:1450; Emesis/NG output:200]     Physical Exam  Constitutional:       General: She is not in acute distress.     Appearance: Normal appearance.   Cardiovascular:      Rate and Rhythm: Normal rate.      Pulses: Normal pulses.      Heart sounds: Murmur (holosystolic murmur) heard.   Pulmonary:      Effort: Pulmonary effort is normal.      Breath sounds: Normal breath sounds.   Abdominal:      General: Bowel sounds are normal.      Tenderness: There is no abdominal tenderness.      Comments: Suprapubic catheter in place   Musculoskeletal:      Right lower leg: Edema present.      Left lower leg: Edema present.      Comments: Pitting edema bilaterally upto hip   Skin:     General: Skin is warm and dry.   Neurological:      General: No focal deficit present.      Mental Status: She is alert.   Psychiatric:         Mood and Affect: Mood normal.           Significant Labs:  CBC:   Recent Labs   Lab 08/07/23  0640 08/07/23  0846   WBC 12.64  --    RBC 2.90*  --    HGB 7.9*  --    HCT 24.9* 23*     --    MCV 86  --    MCH 27.2  --    MCHC 31.7*  --      CMP:   Recent Labs   Lab 08/07/23  1126   *   CALCIUM 7.9*   ALBUMIN 2.2*   PROT 5.3*      K 3.5   CO2 13*   *   BUN 65*   CREATININE 4.6*   ALKPHOS 89   ALT 10   AST 9*   BILITOT 0.2     All labs within the past 24 hours have been reviewed.    Significant Imaging:  Labs: Reviewed    Assessment/Plan:     Renal/  * Acute kidney injury superimposed on CKD  Patient with CKD4 (Cr Bl 2.2) previously on dialysis, has not undergone HD in several months. She was sent to the ED by her PCP due to bicarb < 5. On admission, bicarb was 7, Cr was 4.7. Patient was started on bicarb drip with increase in bicarb to 13. VBG with pH 7.2. Patient received 80 of lasix with 1650cc urine output. Urine Na=32, UPCR=5. Etiology ZAK vs progression of CKD. Patient is open to restarting dialysis when necessary.    Acid/base: Metabolic acidosis likely secondary to renal failure and inability to filter H+  ZAK: ZAK of unknown etiology vs CKD progression  Volume status: Difficult to assess but patient is breathing well on RA and reports LE edema is baseline    Recommendations:   - Continue sodium bicarb drip  - Tunneled catheter placement tomorrow with IR  - Continue sevelemer 800 TID AC  - Replete to keep K ~ 4 as sodium bicarb may decrease potassium  - Renally dose meds  - Keep blood pressure as stable as possible  - Avoid contrast with imaging   - Strict I/Os        Thank you for your consult. I will follow-up with patient. Please contact us if you have any additional questions.    Ines Romano MD  Nephrology  [facility] Step[facility]

## 2023-08-07 NOTE — PLAN OF CARE
Sw spoke with patient about SNF level of care. Patient stated she will think about it, as she is not agreeable with the idea of SNF.     Poncho Miller LMSW  Case Management  Emergency Department  [phone]

## 2023-08-07 NOTE — ASSESSMENT & PLAN NOTE
Patient with acute kidney injury/acute renal failure likely due to nonadherence to dialysis and volume overload that is contributing the excessive load on kidneys. ZAK is currently worsening. Baseline creatinine 2.2 - Labs reviewed- Renal function/electrolytes with Estimated Creatinine Clearance: 15.1 mL/min (A) (based on SCr of 4.7 mg/dL (H)). according to latest data. Monitor urine output and serial BMP and adjust therapy as needed. Avoid nephrotoxins and renally dose meds for GFR listed above.    - Nephro consulted for IP dialysis evaluation and treatment  - strict I/O  - fluid restriction of 1.5L  - holding home Duloxetine and Voltaren gel due to ZAK   - Single Lasix 80 dose; re-evaluate volume status post  - Current Cr 4.8

## 2023-08-07 NOTE — ASSESSMENT & PLAN NOTE
Patient with CKD4 (Cr Bl 2.2) previously on dialysis, has not undergone HD in several months. She was sent to the ED by her PCP due to bicarb < 5. On admission, bicarb was 7, Cr was 4.7. Patient was started on bicarb drip with increase in bicarb to 13. VBG with pH 7.2. Patient received 80 of lasix with 1650cc urine output. Urine Na=32, UPCR=5. Etiology ZAK vs progression of CKD. Patient is open to restarting dialysis when necessary.    Acid/base: Metabolic acidosis likely secondary to renal failure and inability to filter H+  ZAK: ZAK of unknown etiology vs CKD progression  Volume status: Difficult to assess but patient is breathing well on RA and reports LE edema is baseline    Recommendations:   - Continue sodium bicarb drip  - Tunneled catheter placement tomorrow with IR  - Continue sevelemer 800 TID AC  - Replete to keep K ~ 4 as sodium bicarb may decrease potassium  - Renally dose meds  - Keep blood pressure as stable as possible  - Avoid contrast with imaging   - Strict I/Os

## 2023-08-07 NOTE — PLAN OF CARE
Plan of care discussed with patient. Patient is free of fall/trauma/injury. Denies CP, SOB, or pain/discomfort. All questions addressed. Will continue to monitor     Pt on waffle mattress, turned w wedge. Pt refused to wear CPAP at night. Labs and I&O monitored. Bicarb/dextrose gtt @100ml/hr. PRN meds given for pain with some relief. Pt place on NPO per order.     0510am pt vomited ~100cc liquid. Per MD ok to give zofran early

## 2023-08-07 NOTE — TELEPHONE ENCOUNTER
Attempted to call the patient to schedule an endoscopy procedure(s) Colonoscopy. The patient is currently admitted to hospital. PAT appointment rescheduled.

## 2023-08-07 NOTE — ASSESSMENT & PLAN NOTE
Patient's FSGs are controlled on current medication regimen.  Last A1c reviewed-   Lab Results   Component Value Date    HGBA1C 6.1 (H) 08/04/2023     Most recent fingerstick glucose reviewed-   Recent Labs   Lab 08/06/23 2038 08/07/23  0814   POCTGLUCOSE 176* 151*     Current correctional scale  Low  Maintain anti-hyperglycemic dose as follows-   Antihyperglycemics (From admission, onward)    Start     Stop Route Frequency Ordered    08/07/23 0835  insulin aspart U-100 pen 0-5 Units         -- SubQ Before meals & nightly PRN 08/07/23 0819    08/06/23 2100  insulin detemir U-100 (Levemir) pen 8 Units         -- SubQ Nightly 08/06/23 1920        Hold Oral hypoglycemics while patient is in the hospital. Home dose includes 15 units lantus, but following IP management.     PLAN:  - Goal blood glucose range while admitted: 140-180 per the NICE-SUGAR trial and American Diabetes Association guidelines  - Detemir 8 units qhs  - SSI with POCT accuchecks ACHS and Diabetic diet 1500 duyen with fluid restriction 1500mL  - Diabetic counseling and education (eg nutrition, insulin) to be given prior to discharge

## 2023-08-07 NOTE — CONSULTS
[facility] - [facility] Stepdown  Wound Care    Patient Name:  Cecile Bowen   MRN:  [id number]  Date: 8/7/2023  Diagnosis: Acute kidney injury superimposed on CKD    History:     Past Medical History:   Diagnosis Date    Cervicogenic migraine     Chronic pain     CKD (chronic kidney disease) stage 4, GFR 15-29 ml/min     [facility]    CKD (chronic kidney disease) stage 4, GFR 15-29 ml/min     Diabetes mellitus     Long term use of Insulin, Diabetic Neuropathy    Dialysis patient 1/21/2022    Fibromyalgia     Hydronephrosis     Hyperlipidemia     Hypertension 12/12/2012    Hypothyroidism 12/12/2012    ARON (iron deficiency anemia)     Insomnia     Levoscoliosis     Malignant neoplasm of upper-outer quadrant of left breast in female, estrogen receptor positive     Metabolic acidosis     Mobility impaired     Nuclear sclerosis - Both Eyes 3/24/2014    VIJAYA (obstructive sleep apnea)     Osteopenia     Pulmonary nodule     Recurrent UTI     Renal manifestation of secondary diabetes mellitus     Secondary hyperparathyroidism, renal     Urinary retention        Social History     Socioeconomic History    Marital status:     Number of children: 0    Highest education level: Master's degree (e.g., MA, MS, Lelo, MEd, MSW, BANDAR)   Occupational History    Occupation: teacher     Comment: retired   Tobacco Use    Smoking status: Never    Smokeless tobacco: Never   Substance and Sexual Activity    Alcohol use: No     Alcohol/week: 0.0 standard drinks of alcohol    Drug use: No    Sexual activity: Not Currently     Birth control/protection: Abstinence   Social History Narrative    Single ()             Brother passed away last year.  Pt lives her her sister. (5/27)     Social Determinants of Health     Financial Resource Strain: Low Risk  (5/31/2023)    Overall Financial Resource Strain (CARDIA)     Difficulty of Paying Living Expenses: Not hard at all   Food Insecurity: No Food Insecurity (5/31/2023)    Hunger Vital  Sign     Worried About Running Out of Food in the Last Year: Never true     Ran Out of Food in the Last Year: Never true   Transportation Needs: No Transportation Needs (5/31/2023)    PRAPARE - Transportation     Lack of Transportation (Medical): No     Lack of Transportation (Non-Medical): No   Physical Activity: Inactive (5/31/2023)    Exercise Vital Sign     Days of Exercise per Week: 0 days     Minutes of Exercise per Session: 0 min   Stress: No Stress Concern Present (5/31/2023)    [employer] - Occupational Stress Questionnaire     Feeling of Stress : Not at all   Social Connections: Moderately Isolated (5/31/2023)    Social Connection and Isolation Panel [NHANES]     Frequency of Communication with Friends and Family: More than three times a week     Frequency of Social Gatherings with Friends and Family: Twice a week     Attends Buddhist Services: More than 4 times per year     Active Member of Clubs or Organizations: No     Attends Club or Organization Meetings: Never     Marital Status:    Housing Stability: Low Risk  (5/31/2023)    Housing Stability Vital Sign     Unable to Pay for Housing in the Last Year: No     Number of Places Lived in the Last Year: 1     Unstable Housing in the Last Year: No       Precautions:     Allergies as of 08/06/2023    (No Known Allergies)       [facility] Assessment Details/Treatment   Patient seen for wound care consultation to buttocks.   Reviewed chart for this encounter.   See Flow Sheet for findings.    Pt lying in bed agreeable to care. Primary RN assisted in turning pt. Pt cleansed with Coloplast wipes. Triad applied to the buttocks to create a moisture barrier. Borders of the wound were irregular, pink, and painful. Pt states she wore diapers at home and was incontinent. Diaper removed at this time but was noted to be dry. Pt and primary RN educated on the use of jeannine pads instead of diapers. Groin cleansed with soap and water before  blotting dry and applying antifungal cream. Antifungal cream used to prevent any yeast from forming. Waffle mattress in place, wound care inflated to proper psi. Pt denied any needs at this time. Supplies left at bedside.     RECOMMENDATIONS  Recommendations made to primary team for above plan via secured chat. Wound Care to follow up. Orders placed.     Discussed POC with patient and primary RN.   See EMR for orders & patient education.  Discussed POC with primary team.  [facility] Notified.    Nursing to continue care.  Nursing to maintain pressure injury prevention interventions.  Contact wound care for any further questions.     08/07/23 1130   WOCN Assessment   WOCN Total Time (mins) 30   Visit Date 08/07/23   Visit Time 1130   Consult Type New   WOCN Speciality Wound   Wound Moisture; yeast   Intervention assessed;changed;applied;chart review;coordination of care;orders   Teaching on-going        Altered Skin Integrity 08/06/23 1718 Buttocks   Date First Assessed/Time First Assessed: 08/06/23 1718   Altered Skin Integrity Present on Admission - Did Patient arrive to the hospital with altered skin?: yes  Location: Buttocks   Wound Image     Dressing Appearance No dressing   Drainage Amount None   Appearance Pink;Red;Moist   Tissue loss description Partial thickness   Periwound Area Denuded;Moist;Pink   Care Cleansed with:;Soap and water   Dressing Applied;Other (comment)  (Triad)   Dressing Change Due 08/07/23        Altered Skin Integrity 08/07/23 1130 Right anterior Greater trochanter Moisture associated dermatitis   Date First Assessed/Time First Assessed: 08/07/23 1130   Side: Right  Orientation: anterior  Location: Greater trochanter  Primary Wound Type: Moisture associated dermatitis   Dressing Appearance No dressing   Drainage Amount None   Appearance Red;Moist   Tissue loss description Partial thickness   Care Cleansed with:;Soap and water   Dressing Applied;Other (comment)  (Antifungal cream)   Dressing  Change Due 08/07/23 08/07/2023

## 2023-08-07 NOTE — PLAN OF CARE
AAOX4, hypertensive,O2 sats>96% on RA.Plan of care discussed with patient.Patient has no complaints of chest pain/SOB/palpitations.Fall precautions in place,no falls/injuries through the shift.Discussed medications and care.Infusing sodium bicarbonate in D5W @100 mL/hr per EMAR. Wound care performed as per order X 2. Patient has no questions at this time.Pt resting comfortably with no acute distress.Call light within reach,bed at lowest position,sister by the bedside.  Pt aware of NPO order from midnight for IR procedure tomorrow.       Problem: Adult Inpatient Plan of Care  Goal: Plan of Care Review  Outcome: Ongoing, Progressing  Goal: Patient-Specific Goal (Individualized)  Outcome: Ongoing, Progressing  Goal: Absence of Hospital-Acquired Illness or Injury  Outcome: Ongoing, Progressing  Goal: Optimal Comfort and Wellbeing  Outcome: Ongoing, Progressing  Goal: Readiness for Transition of Care  Outcome: Ongoing, Progressing     Problem: Bariatric Environmental Safety  Goal: Safety Maintained with Care  Outcome: Ongoing, Progressing     Problem: Diabetes Comorbidity  Goal: Blood Glucose Level Within Targeted Range  Outcome: Ongoing, Progressing     Problem: Fluid and Electrolyte Imbalance (Acute Kidney Injury/Impairment)  Goal: Fluid and Electrolyte Balance  Outcome: Ongoing, Progressing     Problem: Oral Intake Inadequate (Acute Kidney Injury/Impairment)  Goal: Optimal Nutrition Intake  Outcome: Ongoing, Progressing     Problem: Renal Function Impairment (Acute Kidney Injury/Impairment)  Goal: Effective Renal Function  Outcome: Ongoing, Progressing     Problem: Impaired Wound Healing  Goal: Optimal Wound Healing  Outcome: Ongoing, Progressing     Problem: Skin Injury Risk Increased  Goal: Skin Health and Integrity  Outcome: Ongoing, Progressing

## 2023-08-07 NOTE — HOSPITAL COURSE
"Admitted to [facility] Medicine 4 for further evaluation and treatment. Diuresed with Lasix 80 IV and given sodium bicarb gtt, which did help improve her bicarb and pH.  However, Nephrology felt her kidney function warranted resuming iHD. Currently volume overloaded and on fluid restriction. Initiated Carvedilol at 3.125, titrated up as needed for optimal BP control. Discontinued home clonidine which was being used as "PRN". Discontinued bicarb gtt. Received tunnelled cath today for iHD and had one round of dialysis 08/08 with Net  mls. Hgb AM was 6.7, transfused with 1u pRBC. Cr improving. Discontinued amlodipine and started nifedipine for longer acting BP coverage; titrated as needed. HD Chair secured at [facility] for MWF schedule starting 08/11/23. Dispo home with HH. Upon dc pt is HDS.   "

## 2023-08-07 NOTE — HPI
"73 year old woman with CKD4 (BLCr 2.2), T2DM, Afib, neurogenic bladder with suprapubic catheter, hypothyroidism, HLD presented to the ED after critical labs were identified by her PCP (Bicarb < 5). She was told to present to the ED on Friday but did not present until Sunday because she "felt ok". Her bicarb was 7 on admission, Cr was 4.6. Nephrology was consulted for ZAK on CKD4. Of note, patient was on dialysis for about 6 months about a year ago, but was told "her kidneys woke up" so she was able to come off of it. She was initially against restarting dialysis unless absolutely necessary but today she spoke with her sister and is open to starting HD.  "

## 2023-08-07 NOTE — ASSESSMENT & PLAN NOTE
"Patient's FSGs are controlled on current medication regimen.  Last A1c reviewed-   Lab Results   Component Value Date    HGBA1C 6.1 (H) 08/04/2023     Most recent fingerstick glucose reviewed- No results for input(s): "POCTGLUCOSE" in the last 24 hours.  Current correctional scale  Low  Maintain anti-hyperglycemic dose as follows-   Antihyperglycemics (From admission, onward)    Start     Stop Route Frequency Ordered    08/06/23 2100  insulin detemir U-100 (Levemir) pen 8 Units         -- SubQ Nightly 08/06/23 1920        Hold Oral hypoglycemics while patient is in the hospital.      - holding home clonidine, will initiate better BP meds regiment   - PRN hydralazine for SBP >180  "

## 2023-08-08 PROBLEM — E83.39 HYPERPHOSPHATEMIA: Status: ACTIVE | Noted: 2023-08-08

## 2023-08-08 LAB
ABO + RH BLD: NORMAL
ALBUMIN SERPL BCP-MCNC: 2.1 G/DL (ref 3.5–5.2)
ALP SERPL-CCNC: 81 U/L (ref 55–135)
ALT SERPL W/O P-5'-P-CCNC: 7 U/L (ref 10–44)
ANION GAP SERPL CALC-SCNC: 14 MMOL/L (ref 8–16)
AST SERPL-CCNC: 10 U/L (ref 10–40)
BASOPHILS # BLD AUTO: 0.05 K/UL (ref 0–0.2)
BASOPHILS # BLD AUTO: 0.06 K/UL (ref 0–0.2)
BASOPHILS NFR BLD: 0.4 % (ref 0–1.9)
BASOPHILS NFR BLD: 0.5 % (ref 0–1.9)
BILIRUB SERPL-MCNC: 0.2 MG/DL (ref 0.1–1)
BLD GP AB SCN CELLS X3 SERPL QL: NORMAL
BLD PROD TYP BPU: NORMAL
BLOOD UNIT EXPIRATION DATE: NORMAL
BLOOD UNIT TYPE CODE: 6200
BLOOD UNIT TYPE: NORMAL
BUN SERPL-MCNC: 69 MG/DL (ref 8–23)
CALCIUM SERPL-MCNC: 7.6 MG/DL (ref 8.7–10.5)
CHLORIDE SERPL-SCNC: 108 MMOL/L (ref 95–110)
CO2 SERPL-SCNC: 13 MMOL/L (ref 23–29)
CODING SYSTEM: NORMAL
CREAT SERPL-MCNC: 4.5 MG/DL (ref 0.5–1.4)
CROSSMATCH INTERPRETATION: NORMAL
DIFFERENTIAL METHOD: ABNORMAL
DIFFERENTIAL METHOD: ABNORMAL
DISPENSE STATUS: NORMAL
EOSINOPHIL # BLD AUTO: 0.4 K/UL (ref 0–0.5)
EOSINOPHIL # BLD AUTO: 0.4 K/UL (ref 0–0.5)
EOSINOPHIL NFR BLD: 3 % (ref 0–8)
EOSINOPHIL NFR BLD: 3.3 % (ref 0–8)
ERYTHROCYTE [DISTWIDTH] IN BLOOD BY AUTOMATED COUNT: 14.7 % (ref 11.5–14.5)
ERYTHROCYTE [DISTWIDTH] IN BLOOD BY AUTOMATED COUNT: 15 % (ref 11.5–14.5)
EST. GFR  (NO RACE VARIABLE): 9.9 ML/MIN/1.73 M^2
GLUCOSE SERPL-MCNC: 139 MG/DL (ref 70–110)
HAV IGM SERPL QL IA: NORMAL
HBV CORE AB SERPL QL IA: NORMAL
HBV SURFACE AB SER-ACNC: <3 MIU/ML
HBV SURFACE AB SER-ACNC: NORMAL M[IU]/ML
HBV SURFACE AG SERPL QL IA: NORMAL
HCT VFR BLD AUTO: 20.6 % (ref 37–48.5)
HCT VFR BLD AUTO: 23.9 % (ref 37–48.5)
HGB BLD-MCNC: 6.7 G/DL (ref 12–16)
HGB BLD-MCNC: 7.5 G/DL (ref 12–16)
IMM GRANULOCYTES # BLD AUTO: 0.12 K/UL (ref 0–0.04)
IMM GRANULOCYTES # BLD AUTO: 0.13 K/UL (ref 0–0.04)
IMM GRANULOCYTES NFR BLD AUTO: 1 % (ref 0–0.5)
IMM GRANULOCYTES NFR BLD AUTO: 1.1 % (ref 0–0.5)
INR PPP: 1 (ref 0.8–1.2)
LYMPHOCYTES # BLD AUTO: 2.1 K/UL (ref 1–4.8)
LYMPHOCYTES # BLD AUTO: 2.9 K/UL (ref 1–4.8)
LYMPHOCYTES NFR BLD: 17.4 % (ref 18–48)
LYMPHOCYTES NFR BLD: 23.6 % (ref 18–48)
MAGNESIUM SERPL-MCNC: 1.5 MG/DL (ref 1.6–2.6)
MCH RBC QN AUTO: 27.3 PG (ref 27–31)
MCH RBC QN AUTO: 27.8 PG (ref 27–31)
MCHC RBC AUTO-ENTMCNC: 31.4 G/DL (ref 32–36)
MCHC RBC AUTO-ENTMCNC: 32.5 G/DL (ref 32–36)
MCV RBC AUTO: 86 FL (ref 82–98)
MCV RBC AUTO: 87 FL (ref 82–98)
MONOCYTES # BLD AUTO: 0.9 K/UL (ref 0.3–1)
MONOCYTES # BLD AUTO: 1.2 K/UL (ref 0.3–1)
MONOCYTES NFR BLD: 7.3 % (ref 4–15)
MONOCYTES NFR BLD: 9.5 % (ref 4–15)
NEUTROPHILS # BLD AUTO: 7.7 K/UL (ref 1.8–7.7)
NEUTROPHILS # BLD AUTO: 8.6 K/UL (ref 1.8–7.7)
NEUTROPHILS NFR BLD: 62.1 % (ref 38–73)
NEUTROPHILS NFR BLD: 70.8 % (ref 38–73)
NRBC BLD-RTO: 0 /100 WBC
NRBC BLD-RTO: 0 /100 WBC
NUM UNITS TRANS PACKED RBC: NORMAL
PHOSPHATE SERPL-MCNC: 6.4 MG/DL (ref 2.7–4.5)
PLATELET # BLD AUTO: 366 K/UL (ref 150–450)
PLATELET # BLD AUTO: 388 K/UL (ref 150–450)
PMV BLD AUTO: 9.3 FL (ref 9.2–12.9)
PMV BLD AUTO: 9.5 FL (ref 9.2–12.9)
POCT GLUCOSE: 129 MG/DL (ref 70–110)
POCT GLUCOSE: 139 MG/DL (ref 70–110)
POCT GLUCOSE: 140 MG/DL (ref 70–110)
POCT GLUCOSE: 172 MG/DL (ref 70–110)
POCT GLUCOSE: 205 MG/DL (ref 70–110)
POTASSIUM SERPL-SCNC: 3.4 MMOL/L (ref 3.5–5.1)
PROT SERPL-MCNC: 4.9 G/DL (ref 6–8.4)
PROTHROMBIN TIME: 11 SEC (ref 9–12.5)
PTH-INTACT SERPL-MCNC: 196.6 PG/ML (ref 9–77)
RBC # BLD AUTO: 2.41 M/UL (ref 4–5.4)
RBC # BLD AUTO: 2.75 M/UL (ref 4–5.4)
SODIUM SERPL-SCNC: 135 MMOL/L (ref 136–145)
SPECIMEN OUTDATE: NORMAL
WBC # BLD AUTO: 12.1 K/UL (ref 3.9–12.7)
WBC # BLD AUTO: 12.34 K/UL (ref 3.9–12.7)

## 2023-08-08 PROCEDURE — 94761 N-INVAS EAR/PLS OXIMETRY MLT: CPT | Mod: HCNC

## 2023-08-08 PROCEDURE — 80053 COMPREHEN METABOLIC PANEL: CPT | Mod: HCNC

## 2023-08-08 PROCEDURE — 86900 BLOOD TYPING SEROLOGIC ABO: CPT | Mod: HCNC

## 2023-08-08 PROCEDURE — 83970 ASSAY OF PARATHORMONE: CPT | Mod: HCNC

## 2023-08-08 PROCEDURE — 99900035 HC TECH TIME PER 15 MIN (STAT): Mod: HCNC

## 2023-08-08 PROCEDURE — P9016 RBC LEUKOCYTES REDUCED: HCPCS | Mod: HCNC

## 2023-08-08 PROCEDURE — 99233 SBSQ HOSP IP/OBS HIGH 50: CPT | Mod: HCNC,GC,, | Performed by: HOSPITALIST

## 2023-08-08 PROCEDURE — 84100 ASSAY OF PHOSPHORUS: CPT | Mod: HCNC

## 2023-08-08 PROCEDURE — 63600175 PHARM REV CODE 636 W HCPCS: Mod: HCNC | Performed by: STUDENT IN AN ORGANIZED HEALTH CARE EDUCATION/TRAINING PROGRAM

## 2023-08-08 PROCEDURE — 63600175 PHARM REV CODE 636 W HCPCS: Mod: HCNC | Performed by: RADIOLOGY

## 2023-08-08 PROCEDURE — 20600001 HC STEP DOWN PRIVATE ROOM: Mod: HCNC

## 2023-08-08 PROCEDURE — 85025 COMPLETE CBC W/AUTO DIFF WBC: CPT | Mod: 91,HCNC

## 2023-08-08 PROCEDURE — 63600175 PHARM REV CODE 636 W HCPCS: Mod: HCNC | Performed by: HOSPITALIST

## 2023-08-08 PROCEDURE — 86704 HEP B CORE ANTIBODY TOTAL: CPT | Mod: HCNC | Performed by: STUDENT IN AN ORGANIZED HEALTH CARE EDUCATION/TRAINING PROGRAM

## 2023-08-08 PROCEDURE — 36430 TRANSFUSION BLD/BLD COMPNT: CPT

## 2023-08-08 PROCEDURE — 25000003 PHARM REV CODE 250: Mod: HCNC | Performed by: STUDENT IN AN ORGANIZED HEALTH CARE EDUCATION/TRAINING PROGRAM

## 2023-08-08 PROCEDURE — 87340 HEPATITIS B SURFACE AG IA: CPT | Mod: HCNC | Performed by: STUDENT IN AN ORGANIZED HEALTH CARE EDUCATION/TRAINING PROGRAM

## 2023-08-08 PROCEDURE — 36415 COLL VENOUS BLD VENIPUNCTURE: CPT | Mod: HCNC | Performed by: STUDENT IN AN ORGANIZED HEALTH CARE EDUCATION/TRAINING PROGRAM

## 2023-08-08 PROCEDURE — 94660 CPAP INITIATION&MGMT: CPT | Mod: HCNC

## 2023-08-08 PROCEDURE — 25000003 PHARM REV CODE 250: Mod: HCNC

## 2023-08-08 PROCEDURE — 90935 HEMODIALYSIS ONE EVALUATION: CPT | Mod: HCNC

## 2023-08-08 PROCEDURE — 99233 PR SUBSEQUENT HOSPITAL CARE,LEVL III: ICD-10-PCS | Mod: HCNC,GC,, | Performed by: HOSPITALIST

## 2023-08-08 PROCEDURE — 25000003 PHARM REV CODE 250: Mod: HCNC | Performed by: HOSPITALIST

## 2023-08-08 PROCEDURE — 63600175 PHARM REV CODE 636 W HCPCS: Mod: HCNC

## 2023-08-08 PROCEDURE — 86706 HEP B SURFACE ANTIBODY: CPT | Mod: 91,HCNC | Performed by: STUDENT IN AN ORGANIZED HEALTH CARE EDUCATION/TRAINING PROGRAM

## 2023-08-08 PROCEDURE — 86709 HEPATITIS A IGM ANTIBODY: CPT | Mod: HCNC | Performed by: STUDENT IN AN ORGANIZED HEALTH CARE EDUCATION/TRAINING PROGRAM

## 2023-08-08 PROCEDURE — 85610 PROTHROMBIN TIME: CPT | Mod: HCNC | Performed by: PHYSICIAN ASSISTANT

## 2023-08-08 PROCEDURE — 86922 COMPATIBILITY TEST ANTIGLOB: CPT | Mod: HCNC

## 2023-08-08 PROCEDURE — 25000003 PHARM REV CODE 250: Mod: HCNC | Performed by: RADIOLOGY

## 2023-08-08 PROCEDURE — C1752 CATH,HEMODIALYSIS,SHORT-TERM: HCPCS | Mod: HCNC

## 2023-08-08 PROCEDURE — 36415 COLL VENOUS BLD VENIPUNCTURE: CPT | Mod: HCNC | Performed by: PHYSICIAN ASSISTANT

## 2023-08-08 PROCEDURE — 83735 ASSAY OF MAGNESIUM: CPT | Mod: HCNC

## 2023-08-08 RX ORDER — SEVELAMER CARBONATE 800 MG/1
800 TABLET, FILM COATED ORAL
Status: DISCONTINUED | OUTPATIENT
Start: 2023-08-08 | End: 2023-08-12 | Stop reason: HOSPADM

## 2023-08-08 RX ORDER — SODIUM CHLORIDE 9 MG/ML
INJECTION, SOLUTION INTRAVENOUS
Status: CANCELLED | OUTPATIENT
Start: 2023-08-08

## 2023-08-08 RX ORDER — MAGNESIUM SULFATE HEPTAHYDRATE 40 MG/ML
2 INJECTION, SOLUTION INTRAVENOUS ONCE
Status: CANCELLED | OUTPATIENT
Start: 2023-08-08 | End: 2023-08-08

## 2023-08-08 RX ORDER — MIDAZOLAM HYDROCHLORIDE 1 MG/ML
INJECTION INTRAMUSCULAR; INTRAVENOUS
Status: COMPLETED | OUTPATIENT
Start: 2023-08-08 | End: 2023-08-08

## 2023-08-08 RX ORDER — CEFAZOLIN SODIUM 1 G/50ML
SOLUTION INTRAVENOUS
Status: COMPLETED | OUTPATIENT
Start: 2023-08-08 | End: 2023-08-08

## 2023-08-08 RX ORDER — AMLODIPINE BESYLATE 5 MG/1
5 TABLET ORAL DAILY
Status: DISCONTINUED | OUTPATIENT
Start: 2023-08-08 | End: 2023-08-10

## 2023-08-08 RX ORDER — HEPARIN SODIUM 1000 [USP'U]/ML
INJECTION, SOLUTION INTRAVENOUS; SUBCUTANEOUS
Status: COMPLETED | OUTPATIENT
Start: 2023-08-08 | End: 2023-08-08

## 2023-08-08 RX ORDER — HYDROCODONE BITARTRATE AND ACETAMINOPHEN 500; 5 MG/1; MG/1
TABLET ORAL
Status: CANCELLED | OUTPATIENT
Start: 2023-08-08

## 2023-08-08 RX ORDER — LIDOCAINE HYDROCHLORIDE 10 MG/ML
INJECTION INFILTRATION; PERINEURAL
Status: COMPLETED | OUTPATIENT
Start: 2023-08-08 | End: 2023-08-08

## 2023-08-08 RX ORDER — MUPIROCIN 20 MG/G
OINTMENT TOPICAL 2 TIMES DAILY
Status: DISCONTINUED | OUTPATIENT
Start: 2023-08-08 | End: 2023-08-12 | Stop reason: HOSPADM

## 2023-08-08 RX ORDER — HEPARIN SODIUM 1000 [USP'U]/ML
1000 INJECTION, SOLUTION INTRAVENOUS; SUBCUTANEOUS
Status: DISCONTINUED | OUTPATIENT
Start: 2023-08-08 | End: 2023-08-12 | Stop reason: HOSPADM

## 2023-08-08 RX ORDER — POTASSIUM CHLORIDE 20 MEQ/1
40 TABLET, EXTENDED RELEASE ORAL ONCE
Status: COMPLETED | OUTPATIENT
Start: 2023-08-08 | End: 2023-08-08

## 2023-08-08 RX ORDER — FENTANYL CITRATE 50 UG/ML
INJECTION, SOLUTION INTRAMUSCULAR; INTRAVENOUS
Status: COMPLETED | OUTPATIENT
Start: 2023-08-08 | End: 2023-08-08

## 2023-08-08 RX ORDER — HYDROCODONE BITARTRATE AND ACETAMINOPHEN 500; 5 MG/1; MG/1
TABLET ORAL
Status: DISCONTINUED | OUTPATIENT
Start: 2023-08-08 | End: 2023-08-09

## 2023-08-08 RX ORDER — CARVEDILOL 6.25 MG/1
6.25 TABLET ORAL 2 TIMES DAILY
Status: DISCONTINUED | OUTPATIENT
Start: 2023-08-08 | End: 2023-08-08

## 2023-08-08 RX ORDER — FUROSEMIDE 10 MG/ML
80 INJECTION INTRAMUSCULAR; INTRAVENOUS
Status: DISCONTINUED | OUTPATIENT
Start: 2023-08-08 | End: 2023-08-09

## 2023-08-08 RX ORDER — SODIUM CHLORIDE 9 MG/ML
INJECTION, SOLUTION INTRAVENOUS ONCE
Status: COMPLETED | OUTPATIENT
Start: 2023-08-08 | End: 2023-08-08

## 2023-08-08 RX ORDER — CARVEDILOL 3.12 MG/1
3.12 TABLET ORAL 2 TIMES DAILY
Status: DISCONTINUED | OUTPATIENT
Start: 2023-08-08 | End: 2023-08-09

## 2023-08-08 RX ORDER — HYDRALAZINE HYDROCHLORIDE 50 MG/1
50 TABLET, FILM COATED ORAL EVERY 8 HOURS PRN
Status: DISCONTINUED | OUTPATIENT
Start: 2023-08-08 | End: 2023-08-12 | Stop reason: HOSPADM

## 2023-08-08 RX ORDER — MAGNESIUM SULFATE 1 G/100ML
1 INJECTION INTRAVENOUS ONCE
Status: COMPLETED | OUTPATIENT
Start: 2023-08-08 | End: 2023-08-08

## 2023-08-08 RX ADMIN — HYDROCODONE BITARTRATE AND ACETAMINOPHEN 1 TABLET: 10; 325 TABLET ORAL at 05:08

## 2023-08-08 RX ADMIN — MUPIROCIN: 20 OINTMENT TOPICAL at 08:08

## 2023-08-08 RX ADMIN — INSULIN DETEMIR 8 UNITS: 100 INJECTION, SOLUTION SUBCUTANEOUS at 08:08

## 2023-08-08 RX ADMIN — METHOCARBAMOL 1000 MG: 750 TABLET, FILM COATED ORAL at 08:08

## 2023-08-08 RX ADMIN — AMLODIPINE BESYLATE 5 MG: 5 TABLET ORAL at 05:08

## 2023-08-08 RX ADMIN — CARVEDILOL 3.12 MG: 3.12 TABLET, FILM COATED ORAL at 11:08

## 2023-08-08 RX ADMIN — ANASTROZOLE 1 MG: 1 TABLET, COATED ORAL at 11:08

## 2023-08-08 RX ADMIN — LEVOTHYROXINE SODIUM 50 MCG: 50 TABLET ORAL at 05:08

## 2023-08-08 RX ADMIN — APIXABAN 5 MG: 5 TABLET, FILM COATED ORAL at 08:08

## 2023-08-08 RX ADMIN — HYDROCODONE BITARTRATE AND ACETAMINOPHEN 1 TABLET: 10; 325 TABLET ORAL at 03:08

## 2023-08-08 RX ADMIN — APIXABAN 5 MG: 5 TABLET, FILM COATED ORAL at 11:08

## 2023-08-08 RX ADMIN — ERGOCALCIFEROL 50000 UNITS: 1.25 CAPSULE ORAL at 11:08

## 2023-08-08 RX ADMIN — SODIUM CHLORIDE: 9 INJECTION, SOLUTION INTRAVENOUS at 01:08

## 2023-08-08 RX ADMIN — FENTANYL CITRATE 100 MCG: 50 INJECTION, SOLUTION INTRAMUSCULAR; INTRAVENOUS at 08:08

## 2023-08-08 RX ADMIN — HEPARIN SODIUM 1000 UNITS: 1000 INJECTION, SOLUTION INTRAVENOUS; SUBCUTANEOUS at 03:08

## 2023-08-08 RX ADMIN — METHOCARBAMOL 1000 MG: 750 TABLET, FILM COATED ORAL at 05:08

## 2023-08-08 RX ADMIN — ATORVASTATIN CALCIUM 80 MG: 20 TABLET, FILM COATED ORAL at 11:08

## 2023-08-08 RX ADMIN — METHOCARBAMOL 1000 MG: 750 TABLET, FILM COATED ORAL at 11:08

## 2023-08-08 RX ADMIN — TRAZODONE HYDROCHLORIDE 100 MG: 50 TABLET ORAL at 08:08

## 2023-08-08 RX ADMIN — HEPARIN SODIUM 3200 UNITS: 1000 INJECTION, SOLUTION INTRAVENOUS; SUBCUTANEOUS at 08:08

## 2023-08-08 RX ADMIN — CARVEDILOL 3.12 MG: 3.12 TABLET, FILM COATED ORAL at 08:08

## 2023-08-08 RX ADMIN — CEFAZOLIN SODIUM 1 G: 1 SOLUTION INTRAVENOUS at 08:08

## 2023-08-08 RX ADMIN — MIDAZOLAM HYDROCHLORIDE 2 MG: 2 INJECTION, SOLUTION INTRAMUSCULAR; INTRAVENOUS at 08:08

## 2023-08-08 RX ADMIN — FUROSEMIDE 80 MG: 10 INJECTION, SOLUTION INTRAVENOUS at 08:08

## 2023-08-08 RX ADMIN — SEVELAMER CARBONATE 800 MG: 800 TABLET, FILM COATED ORAL at 05:08

## 2023-08-08 RX ADMIN — FUROSEMIDE 80 MG: 10 INJECTION, SOLUTION INTRAVENOUS at 11:08

## 2023-08-08 RX ADMIN — SEVELAMER CARBONATE 800 MG: 800 TABLET, FILM COATED ORAL at 12:08

## 2023-08-08 RX ADMIN — OXYBUTYNIN CHLORIDE 10 MG: 5 TABLET, EXTENDED RELEASE ORAL at 11:08

## 2023-08-08 RX ADMIN — POTASSIUM CHLORIDE 40 MEQ: 1500 TABLET, EXTENDED RELEASE ORAL at 11:08

## 2023-08-08 RX ADMIN — LIDOCAINE HYDROCHLORIDE 6 ML: 10 INJECTION, SOLUTION INFILTRATION; PERINEURAL at 08:08

## 2023-08-08 RX ADMIN — HYDRALAZINE HYDROCHLORIDE 10 MG: 20 INJECTION, SOLUTION INTRAMUSCULAR; INTRAVENOUS at 11:08

## 2023-08-08 RX ADMIN — MAGNESIUM SULFATE HEPTAHYDRATE 1 G: 500 INJECTION, SOLUTION INTRAMUSCULAR; INTRAVENOUS at 11:08

## 2023-08-08 RX ADMIN — MUPIROCIN: 20 OINTMENT TOPICAL at 11:08

## 2023-08-08 NOTE — SUBJECTIVE & OBJECTIVE
Interval History: Patient getting tunneled cath this morning with IR. Will start dialysis once placed. Cr stable at 4.5. Bicarb stable at 13. Anemia workup and PTH today.    Review of patient's allergies indicates:  No Known Allergies  Current Facility-Administered Medications   Medication Frequency    anastrozole tablet 1 mg Daily    apixaban tablet 5 mg BID    atorvastatin tablet 80 mg Daily    carvediloL tablet 3.125 mg BID    dextrose 10% bolus 125 mL 125 mL PRN    dextrose 10% bolus 250 mL 250 mL PRN    ergocalciferol capsule 50,000 Units Q7 Days    furosemide injection 80 mg Q12H    glucagon (human recombinant) injection 1 mg PRN    glucose chewable tablet 16 g PRN    glucose chewable tablet 24 g PRN    hydrALAZINE injection 10 mg Q8H PRN    HYDROcodone-acetaminophen  mg per tablet 1 tablet Q6H PRN    insulin aspart U-100 pen 0-5 Units QID (AC + HS) PRN    insulin detemir U-100 (Levemir) pen 8 Units QHS    levothyroxine tablet 50 mcg Before breakfast    methocarbamoL tablet 1,000 mg TID    ondansetron disintegrating tablet 8 mg Q12H PRN    oxybutynin 24 hr tablet 10 mg Daily    potassium chloride SA CR tablet 40 mEq Once    sevelamer carbonate tablet 800 mg TID WM    traZODone tablet 100 mg QHS       Objective:     Vital Signs (Most Recent):  Temp: 97.7 °F (36.5 °C) (08/08/23 0845)  Pulse: 76 (08/08/23 0845)  Resp: 18 (08/08/23 0845)  BP: (!) 153/85 (08/08/23 0845)  SpO2: 99 % (08/08/23 0845) Vital Signs (24h Range):  Temp:  [97.7 °F (36.5 °C)-98.6 °F (37 °C)] 97.7 °F (36.5 °C)  Pulse:  [60-84] 76  Resp:  [12-19] 18  SpO2:  [96 %-100 %] 99 %  BP: (126-193)/(58-87) 153/85     Weight: 113.9 kg (251 lb) (08/07/23 0937)  Body mass index is 38.16 kg/m².  Body surface area is 2.34 meters squared.    I/O last 3 completed shifts:  In: 1961.5 [P.O.:1464; IV Piggyback:497.5]  Out: 3900 [Urine:3800; Emesis/NG output:100]     Physical Exam  Constitutional:       General: She is not in acute distress.     Appearance:  Normal appearance.   Cardiovascular:      Rate and Rhythm: Normal rate.      Pulses: Normal pulses.      Heart sounds: Murmur (holosystolic murmur) heard.      Comments: HD line in place  Pulmonary:      Effort: Pulmonary effort is normal.      Breath sounds: Normal breath sounds.   Abdominal:      General: Bowel sounds are normal.      Tenderness: There is no abdominal tenderness.      Comments: Suprapubic catheter in place   Musculoskeletal:      Right lower leg: Edema present.      Left lower leg: Edema present.      Comments: Pitting edema bilaterally upto hip   Skin:     General: Skin is warm and dry.   Neurological:      General: No focal deficit present.      Mental Status: She is alert.   Psychiatric:         Mood and Affect: Mood normal.          Significant Labs:  CBC:   Recent Labs   Lab 08/08/23  0439   WBC 12.34   RBC 2.41*   HGB 6.7*   HCT 20.6*      MCV 86   MCH 27.8   MCHC 32.5     CMP:   Recent Labs   Lab 08/08/23  0439   *   CALCIUM 7.6*   ALBUMIN 2.1*   PROT 4.9*   *   K 3.4*   CO2 13*      BUN 69*   CREATININE 4.5*   ALKPHOS 81   ALT 7*   AST 10   BILITOT 0.2     All labs within the past 24 hours have been reviewed.     Significant Imaging:  Labs: Reviewed

## 2023-08-08 NOTE — ASSESSMENT & PLAN NOTE
Normal anion gap metabolic acidosis    Patient with acute kidney injury/acute renal failure likely due to nonadherence to dialysis and volume overload that is contributing the excessive load on kidneys. ZAK is currently improving. Baseline creatinine 2.2 - Labs reviewed- Renal function/electrolytes with Estimated Creatinine Clearance: 15.2 mL/min (A) (based on SCr of 4.5 mg/dL (H)). according to latest data. Monitor urine output and serial BMP and adjust therapy as needed. Avoid nephrotoxins and renally dose meds for GFR listed above.    - Nephro recommends sodium bicarb gtt and IV lasix 80, with anticipating of iHD; bicarb improving, currently 13  - strict I/O  - Renal diet  - holding home Duloxetine and Voltaren gel due to ZAK   - Current Cr 4.6  - replenishing K as needed  - 08/07 -->VBG 7.213 / 27.4 / 42 / 11.0 --> primary metabolic acidosis with compensatory respiratory alkalosis   - acidosis improving from 7.057 --> 7.213  - Per echo results from 08/07/23 EF 60% with normal systolic and estimated diastolic function, normal CVP   - indicating extravascular volume overload    08/08  - Tunnelled cath place for HD  - Cr improving; current 4.5  - Bicarb improving, current 13; discontinued bicarb gtt in preparation for IP HD  - replenishing Mg as needed  - During HD: Net  mls  - received 1 u RBC during HD as Hgb AM was 6.7 - likely due to dilution effect from previous bicarb gtt  - 2.35L output over 24hrs while on lasix

## 2023-08-08 NOTE — PT/OT/SLP PROGRESS
Physical Therapy  Screen      Patient Name:  Cecile Bowen   MRN:  [id number]    Patient not seen today secondary to  (per interview with pt, Pt sister lives with her and  is her caregiver. Pt states that she spends her day in lift recliner and uses christina lift to get to hospital bed for cleaning. pt has not ambulated in 1 1/2 years due to significant back pain. Pt has equipment in place. Pt has no skilled PT needs and can be discharge home when medically stable. ).       8/8/2023

## 2023-08-08 NOTE — ASSESSMENT & PLAN NOTE
Patient's FSGs are controlled on current medication regimen.  Last A1c reviewed-   Lab Results   Component Value Date    HGBA1C 6.1 (H) 08/04/2023     Most recent fingerstick glucose reviewed-   Recent Labs   Lab 08/07/23  1956 08/08/23  1058 08/08/23  1131 08/08/23  1543   POCTGLUCOSE 202* 139* 140* 172*     Current correctional scale  Low  Maintain anti-hyperglycemic dose as follows-   Antihyperglycemics (From admission, onward)    Start     Stop Route Frequency Ordered    08/07/23 0835  insulin aspart U-100 pen 0-5 Units         -- SubQ Before meals & nightly PRN 08/07/23 0819    08/06/23 2100  insulin detemir U-100 (Levemir) pen 8 Units         -- SubQ Nightly 08/06/23 1920        Hold Oral hypoglycemics while patient is in the hospital.      - holding home clonidine  - initiated COREG 3.125 mg BID, will titrate as needed  - PRN hydralazine for SBP >180      BP Readings from Last 1 Encounters:   08/08/23 (!) 195/86

## 2023-08-08 NOTE — CARE UPDATE
Pt fully recovered from procedure and report called to RON Dorsey. Pt left with transport for room.

## 2023-08-08 NOTE — ASSESSMENT & PLAN NOTE
Patient with CKD4 (Cr Bl 2.2) previously on dialysis, has not undergone HD in several months. She was sent to the ED by her PCP due to bicarb < 5. On admission, bicarb was 7, Cr was 4.7. Patient was started on bicarb drip with increase in bicarb to 13. VBG with pH 7.2. Patient received 80 of lasix with 1650cc urine output. Urine Na=32, UPCR=5. Etiology ZAK vs progression of CKD. Patient is open to restarting dialysis when necessary.    Acid/base: Metabolic acidosis likely secondary to renal failure and inability to filter H+  ZAK: ZAK of unknown etiology vs CKD progression  MBD: PTH today, Corrected Ca = 9.1  Anemia: Hgb of 6.7 today, workup labs today  Volume status: Difficult to assess but patient is breathing well on RA and reports LE edema is baseline    Recommendations:   - Tunneled catheter placement today with dialysis to follow   - Anemia workup   - PTH lab today  - Disontinue sodium bicarb drip at initiation of dialysis  - Continue sevelemer 800 TID AC  - Replete to keep K ~ 4   - Renally dose meds  - Keep blood pressure as stable as possible  - Avoid contrast with imaging   - Strict I/Os

## 2023-08-08 NOTE — PROGRESS NOTES
"[facility] - Cardiology [facility] Medicine  Progress Note    Patient Name: Cecile Bowen  MRN: [id number]  Patient Class: IP- Inpatient   Admission Date: 8/6/2023  Length of Stay: 2 days  Attending Physician: Meagan Templeton*  Primary Care Provider: Lurdes Navarro MD        Subjective:     Principal Problem:Acute kidney injury superimposed on CKD        HPI:  Cecile Bowen is 73 y.o. with T2DM on insulin, Afib on Eliquis, neurogenic bladder with suprapubic catheter, CKD4 previously on dialysis, HLD, hypothyroidism, migraine, chronic pain who presents to [facility] ED for critical labs from her PCP visit. PCP instructed pt on Friday 8/04 to present to ED for bicarb less than 5 and ZAK. She felt like she was fine and waited till Sunday 08/06 to present to ED. She reports "feeling low physically" and that's what prompted her to come in. Reports good hydration but poor oral nutrition; not much food in the house. She has a suprapubic catheter that was placed 7 years ago, and was recently changed 2 wks ago, per pt. Denies smoking use or hx, alcohol use or other use of illict drugs. Reports chronic constipation, chronic back pain, intermittent productive cough, weakness, chronic bilateral floaters and lightheadedness. She is wheelchair bound because she reports no muscle tone in legs. Reports urinating well. Cannot account for dysuria as pt has catheter in place, but does report recently foul smelling urine. Denies CP, SOB, unexpected weight loss, fever, abdominal pain, diarrhea. Denies any recent changes to medications or any sick contacts. Denies recent falls; last fall was one year w/o head trauma.     Was previously on dialysis for about 6 months about a year ago, but was told "her kidneys woke up" so she was able to come off of it.  Reports that care is in the hands of her sister and feels that she does not receive much care.  She also reports not being very hungry usually because she tends to " "get nauseated by food.  See CM/SW and nursing notes for more info on housing situation.         Overview/Hospital Course:  Admitted to [facility] 4 for further evaluation and treatment. Diuresed with Lasix 80 IV and given sodium bicarb gtt, which did help improve her bicarb and pH.  However, Nephrology felt her kidney function warranted resuming iHD. Currently volume overloaded and on fluid restriction. Initiated Carvedilol at 3.125, titrated up as needed for optimal BP control. Discontinued home clonidine which was being used as "PRN". Discontinued bicarb gtt. Received tunnelled cath today for iHD and had one round of dialysis 08/08 with Net  mls. Hgb AM was 6.7, transfused with 1u pRBC. Cr improving. If HDS overnight, likely to dispo to home with HH for HD as OP tomorrow.       Interval History: Getting tunnelled cath. NAOE. No complaints or concerns from pt otherwise.     Review of Systems  Objective:     Vital Signs (Most Recent):  Temp: 98.8 °F (37.1 °C) (08/08/23 1720)  Pulse: 86 (08/08/23 1553)  Resp: 16 (08/08/23 1720)  BP: (!) 195/86 (08/08/23 1720)  SpO2: 99 % (08/08/23 1720) Vital Signs (24h Range):  Temp:  [97.6 °F (36.4 °C)-98.8 °F (37.1 °C)] 98.8 °F (37.1 °C)  Pulse:  [60-92] 86  Resp:  [12-19] 16  SpO2:  [97 %-100 %] 99 %  BP: (111-210)/(53-97) 195/86     Weight: 113.9 kg (251 lb)  Body mass index is 38.16 kg/m².    Intake/Output Summary (Last 24 hours) at 8/8/2023 1732  Last data filed at 8/8/2023 1655  Gross per 24 hour   Intake 1559.8 ml   Output 3700 ml   Net -2140.2 ml         Physical Exam  Vitals and nursing note reviewed.   Constitutional:       General: She is not in acute distress.     Appearance: Normal appearance.   Cardiovascular:      Rate and Rhythm: Normal rate and regular rhythm.      Pulses: Normal pulses.      Heart sounds: Murmur (holosystolic murmur) heard.      Comments: Unable to feel distal pulses due to volume overload status. Not able to appreciate " JVD  Pulmonary:      Effort: Pulmonary effort is normal.      Breath sounds: Normal breath sounds.   Abdominal:      General: Bowel sounds are normal.      Palpations: Abdomen is soft.      Tenderness: There is no abdominal tenderness.      Comments: Suprapubic catheter in place   Musculoskeletal:      Right lower leg: Edema present.      Left lower leg: Edema present.      Comments: Pitting edema bilaterally upto hip   Skin:     General: Skin is warm.      Coloration: Skin is pale.   Neurological:      General: No focal deficit present.      Mental Status: She is alert. Mental status is at baseline.   Psychiatric:         Mood and Affect: Mood normal.         Behavior: Behavior normal.             Significant Labs: All pertinent labs within the past 24 hours have been reviewed.    Significant Imaging: I have reviewed all pertinent imaging results/findings within the past 24 hours.      Assessment/Plan:      * Acute kidney injury superimposed on CKD  Normal anion gap metabolic acidosis    Patient with acute kidney injury/acute renal failure likely due to nonadherence to dialysis and volume overload that is contributing the excessive load on kidneys. ZAK is currently improving. Baseline creatinine 2.2 - Labs reviewed- Renal function/electrolytes with Estimated Creatinine Clearance: 15.2 mL/min (A) (based on SCr of 4.5 mg/dL (H)). according to latest data. Monitor urine output and serial BMP and adjust therapy as needed. Avoid nephrotoxins and renally dose meds for GFR listed above.    - Nephro recommends sodium bicarb gtt and IV lasix 80, with anticipating of iHD; bicarb improving, currently 13  - strict I/O  - Renal diet  - holding home Duloxetine and Voltaren gel due to ZAK   - Current Cr 4.6  - replenishing K as needed  - 08/07 -->VBG 7.213 / 27.4 / 42 / 11.0 --> primary metabolic acidosis with compensatory respiratory alkalosis   - acidosis improving from 7.057 --> 7.213  - Per echo results from 08/07/23 EF 60%  with normal systolic and estimated diastolic function, normal CVP   - indicating extravascular volume overload    08/08  - Tunnelled cath place for HD  - Cr improving; current 4.5  - Bicarb improving, current 13; discontinued bicarb gtt in preparation for IP HD  - replenishing Mg as needed  - During HD: Net  mls  - received 1 u RBC during HD as Hgb AM was 6.7 - likely due to dilution effect from previous bicarb gtt  - 2.35L output over 24hrs while on lasix      Hyperphosphatemia  · Current phos 6.4  · Continue sevalemer TID  · Daily CMP  · WCTM        Bacteria in urine  No  sxs of dysuria, increased frequency. Afebrile during admission.    - WCTM   - UCx: NGTD       Pressure injury of buttock, stage 1  - wound care treating      Uncomplicated opioid dependence  - continue home Archer for unbearable chronic back pain         Hypertension associated with diabetes  Patient's FSGs are controlled on current medication regimen.  Last A1c reviewed-   Lab Results   Component Value Date    HGBA1C 6.1 (H) 08/04/2023     Most recent fingerstick glucose reviewed-   Recent Labs   Lab 08/07/23  1956 08/08/23  1058 08/08/23  1131 08/08/23  1543   POCTGLUCOSE 202* 139* 140* 172*     Current correctional scale  Low  Maintain anti-hyperglycemic dose as follows-   Antihyperglycemics (From admission, onward)    Start     Stop Route Frequency Ordered    08/07/23 0835  insulin aspart U-100 pen 0-5 Units         -- SubQ Before meals & nightly PRN 08/07/23 0819    08/06/23 2100  insulin detemir U-100 (Levemir) pen 8 Units         -- SubQ Nightly 08/06/23 1920        Hold Oral hypoglycemics while patient is in the hospital.      - holding home clonidine  - initiated COREG 3.125 mg BID, will titrate as needed  - PRN hydralazine for SBP >180      BP Readings from Last 1 Encounters:   08/08/23 (!) 195/86         A-fib  - home dose Eliquis was 2.5mg BID --> titrated to 5mg BID for prevention of systemic embolism    Type 2 diabetes  mellitus with stage 4 chronic kidney disease, with long-term current use of insulin  Patient's FSGs are controlled on current medication regimen.  Last A1c reviewed-   Lab Results   Component Value Date    HGBA1C 6.1 (H) 08/04/2023     Most recent fingerstick glucose reviewed-   Recent Labs   Lab 08/07/23  1956 08/08/23  1058 08/08/23  1131 08/08/23  1543   POCTGLUCOSE 202* 139* 140* 172*     Current correctional scale  Low  Maintain anti-hyperglycemic dose as follows-   Antihyperglycemics (From admission, onward)    Start     Stop Route Frequency Ordered    08/07/23 0835  insulin aspart U-100 pen 0-5 Units         -- SubQ Before meals & nightly PRN 08/07/23 0819    08/06/23 2100  insulin detemir U-100 (Levemir) pen 8 Units         -- SubQ Nightly 08/06/23 1920        Hold Oral hypoglycemics while patient is in the hospital. Home dose includes 15 units lantus, but following IP management.     PLAN:  - Goal blood glucose range while admitted: 140-180 per the NICE-SUGAR trial and American Diabetes Association guidelines  - Detemir 8 units qhs  - SSI with POCT accuchecks ACHS and Diabetic diet 1500 duyen with fluid restriction 1500mL  - Diabetic counseling and education (eg nutrition, insulin) to be given prior to discharge      Cervicogenic migraine  - Discontinued sumatriptan due to c/f elevated BP  - WCTM      Morbid obesity due to excess calories  Body mass index is 38.16 kg/m². Morbid obesity complicates all aspects of disease management from diagnostic modalities to treatment. Weight loss encouraged and health benefits explained to patient.         Suprapubic catheter  Neurogenic Bladder    Placed 7 yrs ago. Last changed 2 wks ago.       Neurogenic bladder        Normal anion gap metabolic acidosis        VIJAYA (obstructive sleep apnea)  - CPAP ordered      Hyperlipidemia associated with type 2 diabetes mellitus  - continue home atorvastatin      Hypothyroidism  - continue home synthroid      VTE Risk Mitigation  (From admission, onward)         Ordered     heparin (porcine) injection 1,000 Units  As needed (PRN)         08/08/23 1353     apixaban tablet 5 mg  2 times daily         08/07/23 0941                Discharge Planning   FLORENTINO: 8/9/2023     Code Status: Full Code   Is the patient medically ready for discharge?: No    Reason for patient still in hospital (select all that apply): Patient trending condition and Treatment  Discharge Plan A: Home with family   Discharge Delays: None known at this time              Marge Jennings MD  Department of Hospital Medicine   [facility] - Cardiology Stepdown

## 2023-08-08 NOTE — PT/OT/SLP PROGRESS
Occupational Therapy Screen      Patient Name:  Cecile Bowen   MRN:  [id number]    OT screen completed from [id number]. Per interview with patient, she sister lives with her and is her caregiver assisting with sponge bathing and dressing. Pt states that she spends her day in lift recliner and uses christina lift to get to hospital bed for cleaning. Pt is not able to stand and has not ambulated in 1 1/2 years due to significant back pain. Patient reports all necessary DME in place. Pt has no skilled acute OT needs and can be discharge home when medically stable.    8/8/2023

## 2023-08-08 NOTE — CARE UPDATE
Pt arrived to MPU 4 for recovery of a HD line placement. VS stable pt to recover for vs q15min x4 then return to floor.

## 2023-08-08 NOTE — PLAN OF CARE
08/08/23 1555   Post-Acute Status   Post-Acute Authorization Dialysis   Diaylsis Status Referrals Sent   Discharge Plan   Discharge Plan A Home with family   Discharge Plan B New Nursing Home placement - [facility] care facility     Per treatment team pt will need new HD chair.  Nephrology social worker Maru notified.  Discussed that pt will need to be christina-lifted from her wheelchair into her dialysis chair.  Will follow.      Tita Estrella LMSW  [facility]  [id number]

## 2023-08-08 NOTE — NURSING
Pt arrived to 188 for HD cath insertion. Pt oriented to unit and staff. Plan of care reviewed with patient, patient verbalizes understanding. Comfort measures utilized. Pt safely transferred from stretcher to procedural table. Fall risk reviewed with patient, fall risk interventions maintained with direct observation/attendance.  Blankets applied. Pt prepped and draped utilizing standard sterile technique. Patient placed on continuous monitoring, as required by sedation policy. Timeouts completed utilizing standard universal time-out, per department and facility policy. RN to remain at bedside, continuous monitoring maintained. Pt resting comfortably. Denies pain/discomfort. Will continue to monitor. See flow sheets for monitoring, medication administration, and updates.

## 2023-08-08 NOTE — NURSING
HD cath placement complete. Pt tolerated well. VSS. No signs or symptoms of distress noted. Pt will be transferred to MPU bed escorted by RN and report to RN on arrival.   CVC placed under direct fluoro and OK to use per Dr Garza

## 2023-08-08 NOTE — PROGRESS NOTES
"[facility]  Nephrology  Progress Note    Patient Name: Cecile Bowen  MRN: [id number]  Admission Date: 8/6/2023  Hospital Length of Stay: 2 days  Attending Provider: Meagan Templeton*   Primary Care Physician: Lurdes Navarro MD  Principal Problem:Acute kidney injury superimposed on CKD    Subjective:     HPI: 73 year old woman with CKD4 (BLCr 2.2), T2DM, Afib, neurogenic bladder with suprapubic catheter, hypothyroidism, HLD presented to the ED after critical labs were identified by her PCP (Bicarb < 5). She was told to present to the ED on Friday but did not present until Sunday because she "felt ok". Her bicarb was 7 on admission, Cr was 4.6. Nephrology was consulted for ZAK on CKD4. Of note, patient was on dialysis for about 6 months about a year ago, but was told "her kidneys woke up" so she was able to come off of it. She was initially against restarting dialysis unless absolutely necessary but today she spoke with her sister and is open to starting HD.      Interval History: Patient getting tunneled cath this morning with IR. Will start dialysis once placed. Cr stable at 4.5. Bicarb stable at 13. Anemia workup and PTH today.    Review of patient's allergies indicates:  No Known Allergies  Current Facility-Administered Medications   Medication Frequency    anastrozole tablet 1 mg Daily    apixaban tablet 5 mg BID    atorvastatin tablet 80 mg Daily    carvediloL tablet 3.125 mg BID    dextrose 10% bolus 125 mL 125 mL PRN    dextrose 10% bolus 250 mL 250 mL PRN    ergocalciferol capsule 50,000 Units Q7 Days    furosemide injection 80 mg Q12H    glucagon (human recombinant) injection 1 mg PRN    glucose chewable tablet 16 g PRN    glucose chewable tablet 24 g PRN    hydrALAZINE injection 10 mg Q8H PRN    HYDROcodone-acetaminophen  mg per tablet 1 tablet Q6H PRN    insulin aspart U-100 pen 0-5 Units QID (AC + HS) PRN    insulin detemir U-100 (Levemir) pen " 8 Units QHS    levothyroxine tablet 50 mcg Before breakfast    methocarbamoL tablet 1,000 mg TID    ondansetron disintegrating tablet 8 mg Q12H PRN    oxybutynin 24 hr tablet 10 mg Daily    potassium chloride SA CR tablet 40 mEq Once    sevelamer carbonate tablet 800 mg TID WM    traZODone tablet 100 mg QHS       Objective:     Vital Signs (Most Recent):  Temp: 97.7 °F (36.5 °C) (08/08/23 0845)  Pulse: 76 (08/08/23 0845)  Resp: 18 (08/08/23 0845)  BP: (!) 153/85 (08/08/23 0845)  SpO2: 99 % (08/08/23 0845) Vital Signs (24h Range):  Temp:  [97.7 °F (36.5 °C)-98.6 °F (37 °C)] 97.7 °F (36.5 °C)  Pulse:  [60-84] 76  Resp:  [12-19] 18  SpO2:  [96 %-100 %] 99 %  BP: (126-193)/(58-87) 153/85     Weight: 113.9 kg (251 lb) (08/07/23 0937)  Body mass index is 38.16 kg/m².  Body surface area is 2.34 meters squared.    I/O last 3 completed shifts:  In: 1961.5 [P.O.:1464; IV Piggyback:497.5]  Out: 3900 [Urine:3800; Emesis/NG output:100]     Physical Exam  Constitutional:       General: She is not in acute distress.     Appearance: Normal appearance.   Cardiovascular:      Rate and Rhythm: Normal rate.      Pulses: Normal pulses.      Heart sounds: Murmur (holosystolic murmur) heard.      Comments: HD line in place  Pulmonary:      Effort: Pulmonary effort is normal.      Breath sounds: Normal breath sounds.   Abdominal:      General: Bowel sounds are normal.      Tenderness: There is no abdominal tenderness.      Comments: Suprapubic catheter in place   Musculoskeletal:      Right lower leg: Edema present.      Left lower leg: Edema present.      Comments: Pitting edema bilaterally upto hip   Skin:     General: Skin is warm and dry.   Neurological:      General: No focal deficit present.      Mental Status: She is alert.   Psychiatric:         Mood and Affect: Mood normal.          Significant Labs:  CBC:   Recent Labs   Lab 08/08/23  0439   WBC 12.34   RBC 2.41*   HGB 6.7*   HCT 20.6*      MCV 86   MCH 27.8   MCHC  32.5     CMP:   Recent Labs   Lab 08/08/23  0439   *   CALCIUM 7.6*   ALBUMIN 2.1*   PROT 4.9*   *   K 3.4*   CO2 13*      BUN 69*   CREATININE 4.5*   ALKPHOS 81   ALT 7*   AST 10   BILITOT 0.2     All labs within the past 24 hours have been reviewed.     Significant Imaging:  Labs: Reviewed    Assessment/Plan:     Renal/  * Acute kidney injury superimposed on CKD  Patient with CKD4 (Cr Bl 2.2) previously on dialysis, has not undergone HD in several months. She was sent to the ED by her PCP due to bicarb < 5. On admission, bicarb was 7, Cr was 4.7. Patient was started on bicarb drip with increase in bicarb to 13. VBG with pH 7.2. Patient received 80 of lasix with 1650cc urine output. Urine Na=32, UPCR=5. Etiology ZAK vs progression of CKD. Patient is open to restarting dialysis when necessary.    Acid/base: Metabolic acidosis likely secondary to renal failure and inability to filter H+  ZAK: ZAK of unknown etiology vs CKD progression  MBD: PTH today, Corrected Ca = 9.1  Anemia: Hgb of 6.7 today, workup labs today  Volume status: Difficult to assess but patient is breathing well on RA and reports LE edema is baseline    Recommendations:   - Tunneled catheter placement today with dialysis to follow   - Anemia workup   - PTH lab today  - Disontinue sodium bicarb drip at initiation of dialysis  - Continue sevelemer 800 TID AC  - Replete to keep K ~ 4   - Renally dose meds  - Keep blood pressure as stable as possible  - Avoid contrast with imaging   - Strict I/Os        Thank you for your consult. I will follow-up with patient. Please contact us if you have any additional questions.    Ines Romano MD  Nephrology  [facility]

## 2023-08-08 NOTE — PROGRESS NOTES
Pt arrived per bed to the dialysis unit. Dr Muñoz notified of pt arrival. Pt insists on eating lunch prior to start of dialysis trx.

## 2023-08-08 NOTE — PROGRESS NOTES
SW attempted to meet with pt in room to discuss outpt dialysis coordination. Pt not seen due to currently being transported to inpt dialysis.    SW will attempt to meet with pt again to discuss outpt dialysis.     CHRIS will continue to follow with updates.    Maru Mendoza LMSW  [facility]  [phone]

## 2023-08-08 NOTE — PROGRESS NOTES
CHRIS spoke with [facility]NS[facility] ([phone]). Abeba confirmed [facility] has a christina lift that can accommodate wheelchair bound pts. SW to send outpt HD referral to [facility] once hep labs result.    UPDATE 3:25PM: Referral for outpt HD sent to [facility] for review.    CHRIS will continue to follow with updates.    Maru Mendoza LMSW  [facility]  [phone]

## 2023-08-08 NOTE — PLAN OF CARE
Plan of care discussed with patient. Patient is free of fall/trauma/injury. Denies CP, SOB, or pain/discomfort. All questions addressed. Will continue to monitor    BG checked & insulin given per order. Pt turned in bed w wedge. Pad changed. Triad & antifungal cream applied per order. Pain meds given with relief. Pt placed on NPO at midnight. Per IM on call, ok to d/c bicarb gtt per order.

## 2023-08-08 NOTE — SUBJECTIVE & OBJECTIVE
Interval History: Getting tunnelled cath. NAOE. No complaints or concerns from pt otherwise.     Review of Systems  Objective:     Vital Signs (Most Recent):  Temp: 98.8 °F (37.1 °C) (08/08/23 1720)  Pulse: 86 (08/08/23 1553)  Resp: 16 (08/08/23 1720)  BP: (!) 195/86 (08/08/23 1720)  SpO2: 99 % (08/08/23 1720) Vital Signs (24h Range):  Temp:  [97.6 °F (36.4 °C)-98.8 °F (37.1 °C)] 98.8 °F (37.1 °C)  Pulse:  [60-92] 86  Resp:  [12-19] 16  SpO2:  [97 %-100 %] 99 %  BP: (111-210)/(53-97) 195/86     Weight: 113.9 kg (251 lb)  Body mass index is 38.16 kg/m².    Intake/Output Summary (Last 24 hours) at 8/8/2023 1732  Last data filed at 8/8/2023 1655  Gross per 24 hour   Intake 1559.8 ml   Output 3700 ml   Net -2140.2 ml         Physical Exam  Vitals and nursing note reviewed.   Constitutional:       General: She is not in acute distress.     Appearance: Normal appearance.   Cardiovascular:      Rate and Rhythm: Normal rate and regular rhythm.      Pulses: Normal pulses.      Heart sounds: Murmur (holosystolic murmur) heard.      Comments: Unable to feel distal pulses due to volume overload status. Not able to appreciate JVD  Pulmonary:      Effort: Pulmonary effort is normal.      Breath sounds: Normal breath sounds.   Abdominal:      General: Bowel sounds are normal.      Palpations: Abdomen is soft.      Tenderness: There is no abdominal tenderness.      Comments: Suprapubic catheter in place   Musculoskeletal:      Right lower leg: Edema present.      Left lower leg: Edema present.      Comments: Pitting edema bilaterally upto hip   Skin:     General: Skin is warm.      Coloration: Skin is pale.   Neurological:      General: No focal deficit present.      Mental Status: She is alert. Mental status is at baseline.   Psychiatric:         Mood and Affect: Mood normal.         Behavior: Behavior normal.             Significant Labs: All pertinent labs within the past 24 hours have been reviewed.    Significant Imaging: I  have reviewed all pertinent imaging results/findings within the past 24 hours.

## 2023-08-08 NOTE — PLAN OF CARE
Problem: Adult Inpatient Plan of Care  Goal: Plan of Care Review  Outcome: Ongoing, Progressing  Goal: Patient-Specific Goal (Individualized)  Outcome: Ongoing, Progressing  Goal: Absence of Hospital-Acquired Illness or Injury  Outcome: Ongoing, Progressing  Goal: Optimal Comfort and Wellbeing  Outcome: Ongoing, Progressing  Goal: Readiness for Transition of Care  Outcome: Ongoing, Progressing     Problem: Bariatric Environmental Safety  Goal: Safety Maintained with Care  Outcome: Ongoing, Progressing     Problem: Diabetes Comorbidity  Goal: Blood Glucose Level Within Targeted Range  Outcome: Ongoing, Progressing     Problem: Fluid and Electrolyte Imbalance (Acute Kidney Injury/Impairment)  Goal: Fluid and Electrolyte Balance  Outcome: Ongoing, Progressing     Problem: Oral Intake Inadequate (Acute Kidney Injury/Impairment)  Goal: Optimal Nutrition Intake  Outcome: Ongoing, Progressing     Problem: Renal Function Impairment (Acute Kidney Injury/Impairment)  Goal: Effective Renal Function  Outcome: Ongoing, Progressing     Problem: Impaired Wound Healing  Goal: Optimal Wound Healing  Outcome: Ongoing, Progressing     Problem: Skin Injury Risk Increased  Goal: Skin Health and Integrity  Outcome: Ongoing, Progressing     Problem: Infection  Goal: Absence of Infection Signs and Symptoms  Outcome: Ongoing, Progressing     Problem: Device-Related Complication Risk (Hemodialysis)  Goal: Safe, Effective Therapy Delivery  Outcome: Ongoing, Progressing     Problem: Hemodynamic Instability (Hemodialysis)  Goal: Effective Tissue Perfusion  Outcome: Ongoing, Progressing     Problem: Infection (Hemodialysis)  Goal: Absence of Infection Signs and Symptoms  Outcome: Ongoing, Progressing

## 2023-08-08 NOTE — PROGRESS NOTES
"2 hour dialysis complete.  Blood returned.  [facility] PC flushed, heparin locked, capped and taped.    C/O "heart racing" a few minutes before tx end.   She was NSR in the 90's.  UF off at that point with no further c/o. Dr. Muñoz notified.    Net  mls.    1 unit of PRBC's given.    Transported from [facility] to room 316 via bed by 2 transporters.  "

## 2023-08-08 NOTE — ASSESSMENT & PLAN NOTE
Patient's FSGs are controlled on current medication regimen.  Last A1c reviewed-   Lab Results   Component Value Date    HGBA1C 6.1 (H) 08/04/2023     Most recent fingerstick glucose reviewed-   Recent Labs   Lab 08/07/23  1956 08/08/23  1058 08/08/23  1131 08/08/23  1543   POCTGLUCOSE 202* 139* 140* 172*     Current correctional scale  Low  Maintain anti-hyperglycemic dose as follows-   Antihyperglycemics (From admission, onward)    Start     Stop Route Frequency Ordered    08/07/23 0835  insulin aspart U-100 pen 0-5 Units         -- SubQ Before meals & nightly PRN 08/07/23 0819    08/06/23 2100  insulin detemir U-100 (Levemir) pen 8 Units         -- SubQ Nightly 08/06/23 1920        Hold Oral hypoglycemics while patient is in the hospital. Home dose includes 15 units lantus, but following IP management.     PLAN:  - Goal blood glucose range while admitted: 140-180 per the NICE-SUGAR trial and American Diabetes Association guidelines  - Detemir 8 units qhs  - SSI with POCT accuchecks ACHS and Diabetic diet 1500 duyen with fluid restriction 1500mL  - Diabetic counseling and education (eg nutrition, insulin) to be given prior to discharge

## 2023-08-09 PROBLEM — L24.A2 IRRITANT CONTACT DERMATITIS DUE TO FECAL, URINARY OR DUAL INCONTINENCE: Status: ACTIVE | Noted: 2023-08-09

## 2023-08-09 LAB
25(OH)D3+25(OH)D2 SERPL-MCNC: 15 NG/ML (ref 30–96)
ALBUMIN SERPL BCP-MCNC: 2 G/DL (ref 3.5–5.2)
ALP SERPL-CCNC: 73 U/L (ref 55–135)
ALT SERPL W/O P-5'-P-CCNC: 6 U/L (ref 10–44)
ANION GAP SERPL CALC-SCNC: 13 MMOL/L (ref 8–16)
AST SERPL-CCNC: 10 U/L (ref 10–40)
BASOPHILS # BLD AUTO: 0.04 K/UL (ref 0–0.2)
BASOPHILS NFR BLD: 0.4 % (ref 0–1.9)
BILIRUB SERPL-MCNC: 0.2 MG/DL (ref 0.1–1)
BUN SERPL-MCNC: 48 MG/DL (ref 8–23)
CALCIUM SERPL-MCNC: 7.2 MG/DL (ref 8.7–10.5)
CHLORIDE SERPL-SCNC: 104 MMOL/L (ref 95–110)
CO2 SERPL-SCNC: 17 MMOL/L (ref 23–29)
CREAT SERPL-MCNC: 3.6 MG/DL (ref 0.5–1.4)
DIFFERENTIAL METHOD: ABNORMAL
EOSINOPHIL # BLD AUTO: 0.3 K/UL (ref 0–0.5)
EOSINOPHIL NFR BLD: 3.2 % (ref 0–8)
ERYTHROCYTE [DISTWIDTH] IN BLOOD BY AUTOMATED COUNT: 16 % (ref 11.5–14.5)
EST. GFR  (NO RACE VARIABLE): 12.9 ML/MIN/1.73 M^2
GLUCOSE SERPL-MCNC: 127 MG/DL (ref 70–110)
HCT VFR BLD AUTO: 23.4 % (ref 37–48.5)
HGB BLD-MCNC: 7.7 G/DL (ref 12–16)
IMM GRANULOCYTES # BLD AUTO: 0.13 K/UL (ref 0–0.04)
IMM GRANULOCYTES NFR BLD AUTO: 1.4 % (ref 0–0.5)
LYMPHOCYTES # BLD AUTO: 2.3 K/UL (ref 1–4.8)
LYMPHOCYTES NFR BLD: 23.5 % (ref 18–48)
MAGNESIUM SERPL-MCNC: 1.6 MG/DL (ref 1.6–2.6)
MCH RBC QN AUTO: 27.8 PG (ref 27–31)
MCHC RBC AUTO-ENTMCNC: 32.9 G/DL (ref 32–36)
MCV RBC AUTO: 85 FL (ref 82–98)
MONOCYTES # BLD AUTO: 1 K/UL (ref 0.3–1)
MONOCYTES NFR BLD: 10.5 % (ref 4–15)
NEUTROPHILS # BLD AUTO: 5.9 K/UL (ref 1.8–7.7)
NEUTROPHILS NFR BLD: 61 % (ref 38–73)
NRBC BLD-RTO: 0 /100 WBC
PHOSPHATE SERPL-MCNC: 4.5 MG/DL (ref 2.7–4.5)
PLATELET # BLD AUTO: 336 K/UL (ref 150–450)
PMV BLD AUTO: 9.2 FL (ref 9.2–12.9)
POCT GLUCOSE: 134 MG/DL (ref 70–110)
POCT GLUCOSE: 137 MG/DL (ref 70–110)
POCT GLUCOSE: 208 MG/DL (ref 70–110)
POCT GLUCOSE: 215 MG/DL (ref 70–110)
POTASSIUM SERPL-SCNC: 3.4 MMOL/L (ref 3.5–5.1)
PROT SERPL-MCNC: 4.7 G/DL (ref 6–8.4)
RBC # BLD AUTO: 2.77 M/UL (ref 4–5.4)
SODIUM SERPL-SCNC: 134 MMOL/L (ref 136–145)
WBC # BLD AUTO: 9.59 K/UL (ref 3.9–12.7)

## 2023-08-09 PROCEDURE — 36415 COLL VENOUS BLD VENIPUNCTURE: CPT | Mod: HCNC

## 2023-08-09 PROCEDURE — 63600175 PHARM REV CODE 636 W HCPCS: Mod: HCNC | Performed by: STUDENT IN AN ORGANIZED HEALTH CARE EDUCATION/TRAINING PROGRAM

## 2023-08-09 PROCEDURE — 99223 1ST HOSP IP/OBS HIGH 75: CPT | Mod: HCNC,,, | Performed by: NURSE PRACTITIONER

## 2023-08-09 PROCEDURE — 82306 VITAMIN D 25 HYDROXY: CPT | Mod: HCNC

## 2023-08-09 PROCEDURE — 80100014 HC HEMODIALYSIS 1:1: Mod: HCNC

## 2023-08-09 PROCEDURE — 94761 N-INVAS EAR/PLS OXIMETRY MLT: CPT | Mod: HCNC

## 2023-08-09 PROCEDURE — 99232 PR SUBSEQUENT HOSPITAL CARE,LEVL II: ICD-10-PCS | Mod: HCNC,GC,, | Performed by: HOSPITALIST

## 2023-08-09 PROCEDURE — 80053 COMPREHEN METABOLIC PANEL: CPT | Mod: HCNC

## 2023-08-09 PROCEDURE — 20600001 HC STEP DOWN PRIVATE ROOM: Mod: HCNC

## 2023-08-09 PROCEDURE — 25000003 PHARM REV CODE 250: Mod: HCNC

## 2023-08-09 PROCEDURE — 63600175 PHARM REV CODE 636 W HCPCS: Mod: HCNC

## 2023-08-09 PROCEDURE — 83735 ASSAY OF MAGNESIUM: CPT | Mod: HCNC

## 2023-08-09 PROCEDURE — 27000207 HC ISOLATION: Mod: HCNC

## 2023-08-09 PROCEDURE — 25000003 PHARM REV CODE 250: Mod: HCNC | Performed by: HOSPITALIST

## 2023-08-09 PROCEDURE — 85025 COMPLETE CBC W/AUTO DIFF WBC: CPT | Mod: HCNC

## 2023-08-09 PROCEDURE — 99232 SBSQ HOSP IP/OBS MODERATE 35: CPT | Mod: HCNC,GC,, | Performed by: HOSPITALIST

## 2023-08-09 PROCEDURE — 94660 CPAP INITIATION&MGMT: CPT | Mod: HCNC

## 2023-08-09 PROCEDURE — 99900035 HC TECH TIME PER 15 MIN (STAT): Mod: HCNC

## 2023-08-09 PROCEDURE — 84100 ASSAY OF PHOSPHORUS: CPT | Mod: HCNC

## 2023-08-09 PROCEDURE — 99223 PR INITIAL HOSPITAL CARE,LEVL III: ICD-10-PCS | Mod: HCNC,,, | Performed by: NURSE PRACTITIONER

## 2023-08-09 RX ORDER — SODIUM CHLORIDE 9 MG/ML
INJECTION, SOLUTION INTRAVENOUS
Status: CANCELLED | OUTPATIENT
Start: 2023-08-09

## 2023-08-09 RX ORDER — CARVEDILOL 3.12 MG/1
3.12 TABLET ORAL 2 TIMES DAILY
Qty: 60 TABLET | Refills: 3 | Status: SHIPPED | OUTPATIENT
Start: 2023-08-09 | End: 2023-08-11 | Stop reason: HOSPADM

## 2023-08-09 RX ORDER — HEPARIN SODIUM 1000 [USP'U]/ML
1000 INJECTION, SOLUTION INTRAVENOUS; SUBCUTANEOUS
Status: CANCELLED | OUTPATIENT
Start: 2023-08-09 | End: 2023-08-10

## 2023-08-09 RX ORDER — LANOLIN ALCOHOL/MO/W.PET/CERES
400 CREAM (GRAM) TOPICAL 2 TIMES DAILY
Status: DISCONTINUED | OUTPATIENT
Start: 2023-08-09 | End: 2023-08-09

## 2023-08-09 RX ORDER — HYDRALAZINE HYDROCHLORIDE 50 MG/1
50 TABLET, FILM COATED ORAL EVERY 8 HOURS PRN
Qty: 90 TABLET | Refills: 3 | Status: SHIPPED | OUTPATIENT
Start: 2023-08-09 | End: 2023-08-11 | Stop reason: HOSPADM

## 2023-08-09 RX ORDER — CARVEDILOL 6.25 MG/1
6.25 TABLET ORAL 2 TIMES DAILY
Status: DISCONTINUED | OUTPATIENT
Start: 2023-08-09 | End: 2023-08-12 | Stop reason: HOSPADM

## 2023-08-09 RX ORDER — POTASSIUM CHLORIDE 20 MEQ/1
40 TABLET, EXTENDED RELEASE ORAL ONCE
Status: COMPLETED | OUTPATIENT
Start: 2023-08-09 | End: 2023-08-09

## 2023-08-09 RX ORDER — AMLODIPINE BESYLATE 5 MG/1
5 TABLET ORAL DAILY
Qty: 30 TABLET | Refills: 3 | Status: SHIPPED | OUTPATIENT
Start: 2023-08-09 | End: 2023-08-11 | Stop reason: HOSPADM

## 2023-08-09 RX ORDER — MAGNESIUM SULFATE HEPTAHYDRATE 40 MG/ML
2 INJECTION, SOLUTION INTRAVENOUS ONCE
Status: COMPLETED | OUTPATIENT
Start: 2023-08-09 | End: 2023-08-09

## 2023-08-09 RX ORDER — SODIUM CHLORIDE 9 MG/ML
INJECTION, SOLUTION INTRAVENOUS ONCE
Status: CANCELLED | OUTPATIENT
Start: 2023-08-09 | End: 2023-08-09

## 2023-08-09 RX ORDER — HYDROCODONE BITARTRATE AND ACETAMINOPHEN 10; 325 MG/1; MG/1
1 TABLET ORAL EVERY 6 HOURS PRN
Qty: 28 TABLET | Refills: 0 | Status: SHIPPED | OUTPATIENT
Start: 2023-08-09 | End: 2023-09-11 | Stop reason: SDUPTHER

## 2023-08-09 RX ORDER — CALCIUM ACETATE 667 MG/1
667 TABLET ORAL
Qty: 90 TABLET | Refills: 11 | Status: SHIPPED | OUTPATIENT
Start: 2023-08-09 | End: 2024-08-08

## 2023-08-09 RX ORDER — LANOLIN ALCOHOL/MO/W.PET/CERES
400 CREAM (GRAM) TOPICAL DAILY
Status: DISCONTINUED | OUTPATIENT
Start: 2023-08-09 | End: 2023-08-12 | Stop reason: HOSPADM

## 2023-08-09 RX ORDER — FUROSEMIDE 40 MG/1
40 TABLET ORAL DAILY
Status: DISCONTINUED | OUTPATIENT
Start: 2023-08-09 | End: 2023-08-12 | Stop reason: HOSPADM

## 2023-08-09 RX ADMIN — HEPARIN SODIUM 1000 UNITS: 1000 INJECTION, SOLUTION INTRAVENOUS; SUBCUTANEOUS at 04:08

## 2023-08-09 RX ADMIN — INSULIN ASPART 1 UNITS: 100 INJECTION, SOLUTION INTRAVENOUS; SUBCUTANEOUS at 08:08

## 2023-08-09 RX ADMIN — ANASTROZOLE 1 MG: 1 TABLET, COATED ORAL at 09:08

## 2023-08-09 RX ADMIN — LEVOTHYROXINE SODIUM 50 MCG: 50 TABLET ORAL at 05:08

## 2023-08-09 RX ADMIN — SEVELAMER CARBONATE 800 MG: 800 TABLET, FILM COATED ORAL at 09:08

## 2023-08-09 RX ADMIN — APIXABAN 5 MG: 5 TABLET, FILM COATED ORAL at 08:08

## 2023-08-09 RX ADMIN — TRAZODONE HYDROCHLORIDE 100 MG: 50 TABLET ORAL at 08:08

## 2023-08-09 RX ADMIN — MAGNESIUM SULFATE 2 G: 2 INJECTION INTRAVENOUS at 08:08

## 2023-08-09 RX ADMIN — POTASSIUM CHLORIDE 40 MEQ: 1500 TABLET, EXTENDED RELEASE ORAL at 09:08

## 2023-08-09 RX ADMIN — METHOCARBAMOL 1000 MG: 750 TABLET, FILM COATED ORAL at 08:08

## 2023-08-09 RX ADMIN — INSULIN DETEMIR 8 UNITS: 100 INJECTION, SOLUTION SUBCUTANEOUS at 08:08

## 2023-08-09 RX ADMIN — HYDROCODONE BITARTRATE AND ACETAMINOPHEN 1 TABLET: 10; 325 TABLET ORAL at 11:08

## 2023-08-09 RX ADMIN — FUROSEMIDE 40 MG: 40 TABLET ORAL at 09:08

## 2023-08-09 RX ADMIN — MUPIROCIN: 20 OINTMENT TOPICAL at 08:08

## 2023-08-09 RX ADMIN — INSULIN ASPART 2 UNITS: 100 INJECTION, SOLUTION INTRAVENOUS; SUBCUTANEOUS at 12:08

## 2023-08-09 RX ADMIN — AMLODIPINE BESYLATE 5 MG: 5 TABLET ORAL at 09:08

## 2023-08-09 RX ADMIN — METHOCARBAMOL 1000 MG: 750 TABLET, FILM COATED ORAL at 09:08

## 2023-08-09 RX ADMIN — CARVEDILOL 3.12 MG: 3.12 TABLET, FILM COATED ORAL at 09:08

## 2023-08-09 RX ADMIN — HYDRALAZINE HYDROCHLORIDE 50 MG: 50 TABLET ORAL at 12:08

## 2023-08-09 RX ADMIN — CARVEDILOL 6.25 MG: 6.25 TABLET, FILM COATED ORAL at 08:08

## 2023-08-09 RX ADMIN — APIXABAN 5 MG: 5 TABLET, FILM COATED ORAL at 09:08

## 2023-08-09 RX ADMIN — SEVELAMER CARBONATE 800 MG: 800 TABLET, FILM COATED ORAL at 05:08

## 2023-08-09 RX ADMIN — METHOCARBAMOL 1000 MG: 750 TABLET, FILM COATED ORAL at 05:08

## 2023-08-09 RX ADMIN — Medication 400 MG: at 12:08

## 2023-08-09 RX ADMIN — ATORVASTATIN CALCIUM 80 MG: 20 TABLET, FILM COATED ORAL at 09:08

## 2023-08-09 RX ADMIN — SEVELAMER CARBONATE 800 MG: 800 TABLET, FILM COATED ORAL at 12:08

## 2023-08-09 RX ADMIN — MUPIROCIN: 20 OINTMENT TOPICAL at 09:08

## 2023-08-09 RX ADMIN — OXYBUTYNIN CHLORIDE 10 MG: 5 TABLET, EXTENDED RELEASE ORAL at 09:08

## 2023-08-09 NOTE — ASSESSMENT & PLAN NOTE
Patient with CKD4 (Cr Bl 2.2) previously on dialysis, has not undergone HD in several months. She was sent to the ED by her PCP due to bicarb < 5. On admission, bicarb was 7, Cr was 4.7. Patient was started on bicarb drip with increase in bicarb to 13. VBG with pH 7.2. Patient received 80 of lasix with 1650cc urine output. Urine Na=32, UPCR=5. Etiology ZAK vs progression of CKD. Patient underwent HD yesterday with improvement in RFP.    Acid/base: Metabolic acidosis likely secondary to renal failure and inability to filter H+  ZAK: ZAK of unknown etiology vs CKD progression  MBD: PTH = 196, Corrected Ca = 8.8  Anemia: Hgb of 7.7 today, consider anemia workup  Volume status: Difficult to assess but patient is breathing well on RA and reports LE edema is improving    Recommendations:   - Dialysis session 2 today  - Suggest anemia workup   - , order vitamin D today  - Continue sevelemer 800 TID AC  - Replete to keep K ~ 4   - Renally dose meds  - Keep blood pressure as stable as possible, will monitor  - Avoid contrast with imaging   - Strict I/Os

## 2023-08-09 NOTE — NURSING
Pt loss both PIVs this AM due to being infiltrated. MD Jareth notified and stated it was okay for patient to be without PIV access.

## 2023-08-09 NOTE — CONSULTS
"[facility] - Cardiology Stepdown  Skin Integrity AUGUSTO  Consult Note    Patient Name: Cecile Bowen  MRN: [id number]  Admission Date: 8/6/2023  Hospital Length of Stay: 3 days  Attending Physician: Meagan Templeton*  Primary Care Provider: Lurdes Navarro MD     Consults  Subjective:     History of Present Illness:  Cecile Bowen is 73 year old female with T2DM on insulin, Afib on Eliquis, neurogenic bladder with suprapubic catheter, CKD4 previously on dialysis, HLD, hypothyroidism, migraine, chronic pain who presents to [facility] ED for critical labs from her PCP visit. PCP instructed pt on Friday 8/04 to present to ED for bicarb less than 5 and ZAK. She felt like she was fine and waited till Sunday 08/06 to present to ED. She reports "feeling low physically" and that's what prompted her to come in. Reports good hydration but poor oral nutrition; not much food in the house. She has a suprapubic catheter that was placed 7 years ago, and was recently changed 2 wks ago, per pt. Denies smoking use or hx, alcohol use or other use of illict drugs. Reports chronic constipation, chronic back pain, intermittent productive cough, weakness, chronic bilateral floaters and lightheadedness. She is wheelchair bound because she reports no muscle tone in legs. Reports urinating well. Cannot account for dysuria as pt has catheter in place, but does report recently foul smelling urine. Denies CP, SOB, unexpected weight loss, fever, abdominal pain, diarrhea. Denies any recent changes to medications or any sick contacts. Denies recent falls; last fall was one year w/o head trauma. Pt admitted to hospital medicine service for further management. Skin integrity AUGUSTO consulted for evaluation of skin injuries.      Scheduled Meds:   amLODIPine  5 mg Oral Daily    anastrozole  1 mg Oral Daily    apixaban  5 mg Oral BID    atorvastatin  80 mg Oral Daily    carvediloL  6.25 mg Oral BID    ergocalciferol  50,000 Units Oral Q7 " Days    furosemide  40 mg Oral Daily    insulin detemir U-100  8 Units Subcutaneous QHS    levothyroxine  50 mcg Oral Before breakfast    magnesium oxide  400 mg Oral Daily    methocarbamoL  1,000 mg Oral TID    mupirocin   Nasal BID    oxybutynin  10 mg Oral Daily    sevelamer carbonate  800 mg Oral TID WM    traZODone  100 mg Oral QHS     Continuous Infusions:  PRN Meds:dextrose 10%, dextrose 10%, glucagon (human recombinant), glucose, glucose, heparin (porcine), hydrALAZINE, HYDROcodone-acetaminophen, insulin aspart U-100, ondansetron    Review of patient's allergies indicates:  No Known Allergies     Past Medical History:   Diagnosis Date    Cervicogenic migraine     Chronic pain     CKD (chronic kidney disease) stage 4, GFR 15-29 ml/min     [facility]    CKD (chronic kidney disease) stage 4, GFR 15-29 ml/min     Diabetes mellitus     Long term use of Insulin, Diabetic Neuropathy    Dialysis patient 1/21/2022    Fibromyalgia     Hydronephrosis     Hyperlipidemia     Hypertension 12/12/2012    Hypothyroidism 12/12/2012    ARON (iron deficiency anemia)     Insomnia     Levoscoliosis     Malignant neoplasm of upper-outer quadrant of left breast in female, estrogen receptor positive     Metabolic acidosis     Mobility impaired     Nuclear sclerosis - Both Eyes 3/24/2014    VIJAYA (obstructive sleep apnea)     Osteopenia     Pulmonary nodule     Recurrent UTI     Renal manifestation of secondary diabetes mellitus     Secondary hyperparathyroidism, renal     Urinary retention      Past Surgical History:   Procedure Laterality Date    BIOPSY OF URETER Left 2/18/2022    Procedure: BIOPSY, URETER;  Surgeon: Ronel Whittington MD;  [facility];  Service: Urology;  Laterality: Left;    BREAST BIOPSY Right     benign    CHOLECYSTECTOMY      COLONOSCOPY N/A 1/13/2017    Procedure: COLONOSCOPY;  Surgeon: Morris Wiseman MD;  Location: [facility] (4TH FLR);  Service:  Endoscopy;  Laterality: N/A;  Renal pt Nephrology advised to avoid phosphate preps    CYSTOSCOPY N/A 10/8/2018    Procedure: CYSTOSCOPY;  Surgeon: Ronel Whittington MD;  Location: [facility] OR 1ST FLR;  Service: Urology;  Laterality: N/A;  45 min    CYSTOSCOPY N/A 3/25/2019    Procedure: CYSTOSCOPY;  Surgeon: Ronel Whittington MD;  Location: [facility] OR 1ST FLR;  Service: Urology;  Laterality: N/A;  45 min    CYSTOSCOPY N/A 8/26/2019    Procedure: CYSTOSCOPY;  Surgeon: Ronel Whittington MD;  Location: [facility] OR 1ST FLR;  Service: Urology;  Laterality: N/A;  45 min    CYSTOSCOPY N/A 7/2/2021    Procedure: CYSTOSCOPY;  Surgeon: Ronel Whittington MD;  Location: [facility] OR 1ST FLR;  Service: Urology;  Laterality: N/A;    CYSTOSCOPY  12/23/2021    Procedure: CYSTOSCOPY;  Surgeon: Ronel Whittington MD;  Location: [facility] OR 1ST FLR;  Service: Urology;;    CYSTOSCOPY  2/18/2022    Procedure: CYSTOSCOPY;  Surgeon: Ronel Whittington MD;  Location: [facility] OR 1ST FLR;  Service: Urology;;    CYSTOSCOPY W/ URETERAL STENT PLACEMENT Left 5/15/2021    Procedure: CYSTOSCOPY, WITH URETERAL STENT INSERTION;  Surgeon: Levy Sánchez Jr., MD;  Location: [facility] OR 1ST FLR;  Service: Urology;  Laterality: Left;    CYSTOSCOPY WITH BIOPSY OF BLADDER N/A 1/27/2020    Procedure: CYSTOSCOPY, WITH BLADDER BIOPSY, WITH FULGURATION IF INDICATED;  Surgeon: Ronel Whittington MD;  Location: [facility] OR 1ST FLR;  Service: Urology;  Laterality: N/A;    DILATION AND CURETTAGE OF UTERUS      GALLBLADDER SURGERY  2006    HYSTERECTOMY      INJECTION FOR SENTINEL NODE IDENTIFICATION Left 8/1/2019    Procedure: INJECTION, FOR SENTINEL NODE IDENTIFICATION;  Surgeon: Samia Fulton MD;  Location: [facility] OR 2ND FLR;  Service: General;  Laterality: Left;    INJECTION OF BOTULINUM TOXIN TYPE A N/A 10/8/2018    Procedure: INJECTION, BOTULINUM TOXIN, TYPE A 300 UNITS;  Surgeon: Ronel Whittington MD;  Location: [facility] OR [address] [place] [address];  Service:  Urology;  Laterality: N/A;    INJECTION OF BOTULINUM TOXIN TYPE A N/A 3/25/2019    Procedure: INJECTION, BOTULINUM TOXIN, TYPE A 300 UNITS;  Surgeon: Ronel Whittington MD;  [facility];  Service: Urology;  Laterality: N/A;    INJECTION OF BOTULINUM TOXIN TYPE A N/A 8/26/2019    Procedure: INJECTION, BOTULINUM TOXIN, TYPE A 300 UNITS;  Surgeon: Ronel Whittington MD;  Location: [facility] OR 1ST FLR;  Service: Urology;  Laterality: N/A;    MASTECTOMY Left 8/1/2019    Procedure: MASTECTOMY 23 hour stay;  Surgeon: Samia Fulton MD;  Location: [facility] OR 2ND FLR;  Service: General;  Laterality: Left;    MEDIPORT REMOVAL Right 6/24/2022    Procedure: REMOVAL, CATHETER, CENTRAL VENOUS, TUNNELED, WITH PORT;  Surgeon: Isauro Anderson MD;  Location: [facility] CATH LAB;  Service: Radiology;  Laterality: Right;    OVARIAN CYST SURGERY  1985    REPLACEMENT OF STENT Left 12/23/2021    Procedure: REPLACEMENT, STENT;  Surgeon: Ronel Whittington MD;  Location: [facility] OR [address] [place] [address];  Service: Urology;  Laterality: Left;    REPLACEMENT OF STENT Left 2/18/2022    Procedure: REPLACEMENT, STENT;  Surgeon: Ronel Whittington MD;  [facility];  Service: Urology;  Laterality: Left;    RETROGRADE PYELOGRAPHY Left 2/18/2022    Procedure: PYELOGRAM, RETROGRADE;  Surgeon: Ronel Whittington MD;  [facility];  Service: Urology;  Laterality: Left;    SENTINEL LYMPH NODE BIOPSY Left 8/1/2019    Procedure: BIOPSY, LYMPH NODE, SENTINEL;  Surgeon: Samia Fulton MD;  Location: [facility] OR 2ND FLR;  Service: General;  Laterality: Left;    spt placement      TONSILLECTOMY, ADENOIDECTOMY      TOTAL ABDOMINAL HYSTERECTOMY W/ BILATERAL SALPINGOOPHORECTOMY  1985    URETEROSCOPY Left 2/18/2022    Procedure: URETEROSCOPY;  Surgeon: Ronel Whittington MD;  Location: [facility] OR 1ST FLR;  Service: Urology;  Laterality: Left;       Family History       Problem Relation (Age of Onset)    ALS Mother     Cancer Maternal Grandmother, Paternal Grandfather, Brother    Diabetes Sister, Maternal Aunt, Maternal Uncle    Kidney disease Sister, Maternal Aunt    Prostate cancer Brother    Scoliosis Brother          Tobacco Use    Smoking status: Never    Smokeless tobacco: Never   Substance and Sexual Activity    Alcohol use: No     Alcohol/week: 0.0 standard drinks of alcohol    Drug use: No    Sexual activity: Not Currently     Birth control/protection: Abstinence     Review of Systems   Skin:  Positive for wound.       Objective:     Vital Signs (Most Recent):  Temp: 97.4 °F (36.3 °C) (08/09/23 1154)  Pulse: 80 (08/09/23 1154)  Resp: 18 (08/09/23 1154)  BP: (!) 186/80 (08/09/23 1201)  SpO2: 97 % (08/09/23 1154) Vital Signs (24h Range):  Temp:  [97.4 °F (36.3 °C)-99 °F (37.2 °C)] 97.4 °F (36.3 °C)  Pulse:  [71-92] 80  Resp:  [16-19] 18  SpO2:  [94 %-99 %] 97 %  BP: (111-195)/(53-86) 186/80     Weight: 113.9 kg (251 lb)  Body mass index is 38.16 kg/m².     Physical Exam  Constitutional:       Appearance: Normal appearance.   Skin:     General: Skin is warm and dry.      Findings: Lesion present.   Neurological:      Mental Status: She is alert.          Laboratory:  All pertinent labs reviewed within the last 24 hours.    Diagnostic Results:  None        Assessment/Plan:         AUGUSTO Skin Integrity Evaluation      Skin Integrity AUGUSTO evaluation of patient as part of the comprehensive skin care team.     She has been admitted for 3 days. Skin injury was noted on 8/6/23. POA yes.      Buttocks              Derm  Irritant contact dermatitis due to fecal, urinary or dual incontinence  - consult received for evaluation of skin injury.  - pt presents to [facility] ED for critical labs from her PCP visit.   - scattered partial thickness tissue loss to buttocks with pink moist wound bases. Likely related to moisture and friction.  - pt has stool incontinence. Suprapubic catheter in place.  - pt has a hx of stage 3 pressure injury to  right buttocks. Likely resolved or healing and these are new scattered injuries from incontinence.   - continue Triad bid/prn.  - roopa surface with waffle overlay in place.  - wedge for offloading.  - pt turns well with minimal assistance.  - encouraged to turn q2h.  - nursing to maintain pressure injury prevention measures and continue wound care per orders.         Thank you for your consult. I will follow-up with patient. Please contact us if you have any additional questions.       Anni Newell NP  Skin Integrity AUGUSTO  [facility] - Cardiology Stepdown

## 2023-08-09 NOTE — ASSESSMENT & PLAN NOTE
Normal anion gap metabolic acidosis    Patient with acute kidney injury/acute renal failure likely due to nonadherence to dialysis and volume overload that is contributing the excessive load on kidneys. ZAK is currently improving. Baseline creatinine 2.2 - Labs reviewed- Renal function/electrolytes with Estimated Creatinine Clearance: 19 mL/min (A) (based on SCr of 3.6 mg/dL (H)). according to latest data. Monitor urine output and serial BMP and adjust therapy as needed. Avoid nephrotoxins and renally dose meds for GFR listed above.  Electrolyte derangements and volume status were addressed medically at first, given her hemodynamic and acid/base stability in the ER.    - Per echo results from 08/07/23 EF 60% with normal systolic and estimated diastolic function, normal CVP: indicating extravascular volume overload  Tunnel cath placed on 8/7.  Pt restarted on HD on 8/8, with second HD session on 8/9.    · Pt will need HD 3x/wk  · Seeking outpatient HD chair at [facility] (where she was previously dialyzed, and which has a Ray lift)  · Continuing to renally dose meds and avoid nephrotoxins  · Continuing renal diet

## 2023-08-09 NOTE — PROGRESS NOTES
"[facility] - Cardiology [facility] Medicine  Progress Note    Patient Name: Cecile Bowen  MRN: [id number]  Patient Class: IP- Inpatient   Admission Date: 8/6/2023  Length of Stay: 3 days  Attending Physician: Meagan Templeton*  Primary Care Provider: Lurdes Navarro MD        Subjective:     Principal Problem:Acute kidney injury superimposed on CKD        HPI:  Cecile Bowen is 73 y.o. with T2DM on insulin, Afib on Eliquis, neurogenic bladder with suprapubic catheter, CKD4 previously on dialysis, HLD, hypothyroidism, migraine, chronic pain who presents to [facility] ED for critical labs from her PCP visit. PCP instructed pt on Friday 8/04 to present to ED for bicarb less than 5 and ZAK. She felt like she was fine and waited till Sunday 08/06 to present to ED. She reports "feeling low physically" and that's what prompted her to come in. Reports good hydration but poor oral nutrition; not much food in the house. She has a suprapubic catheter that was placed 7 years ago, and was recently changed 2 wks ago, per pt. Denies smoking use or hx, alcohol use or other use of illict drugs. Reports chronic constipation, chronic back pain, intermittent productive cough, weakness, chronic bilateral floaters and lightheadedness. She is wheelchair bound because she reports no muscle tone in legs. Reports urinating well. Cannot account for dysuria as pt has catheter in place, but does report recently foul smelling urine. Denies CP, SOB, unexpected weight loss, fever, abdominal pain, diarrhea. Denies any recent changes to medications or any sick contacts. Denies recent falls; last fall was one year w/o head trauma.     Was previously on dialysis for about 6 months about a year ago, but was told "her kidneys woke up" so she was able to come off of it.  Reports that care is in the hands of her sister and feels that she does not receive much care.  She also reports not being very hungry usually because she tends to " "get nauseated by food.  See CM/SW and nursing notes for more info on housing situation.         Overview/Hospital Course:  Admitted to [facility] Medicine 4 for further evaluation and treatment. Diuresed with Lasix 80 IV and given sodium bicarb gtt, which did help improve her bicarb and pH.  However, Nephrology felt her kidney function warranted resuming iHD. Currently volume overloaded and on fluid restriction. Initiated Carvedilol at 3.125, titrated up as needed for optimal BP control. Discontinued home clonidine which was being used as "PRN". Discontinued bicarb gtt. Received tunnelled cath today for iHD and had one round of dialysis 08/08 with Net  mls. Hgb AM was 6.7, transfused with 1u pRBC. Cr improving.  Seeking outpt HD chair at [facility]; if obtained then will DC home w HH ASAP.      Interval History:   Tolerated 1st HD session well.  2nd session today.  BP remains elevated; increasing Coreg dose and starting amlodipine 5 qhs  Anticipate DC home if she gets OP HD chair at [facility].    Objective:     Vital Signs (Most Recent):  Temp: 97.4 °F (36.3 °C) (08/09/23 1154)  Pulse: 94 (08/09/23 1530)  Resp: 18 (08/09/23 1154)  BP: (!) 174/74 (08/09/23 1530)  SpO2: 97 % (08/09/23 1154) Vital Signs (24h Range):  Temp:  [97.4 °F (36.3 °C)-99 °F (37.2 °C)] 97.4 °F (36.3 °C)  Pulse:  [71-97] 94  Resp:  [16-19] 18  SpO2:  [94 %-99 %] 97 %  BP: (126-195)/(60-86) 174/74     Weight: 113.9 kg (251 lb)  Body mass index is 38.16 kg/m².    Intake/Output Summary (Last 24 hours) at 8/9/2023 1547  Last data filed at 8/9/2023 1302  Gross per 24 hour   Intake 1101.8 ml   Output 3750 ml   Net -2648.2 ml         Physical Exam  Constitutional:       General: She is not in acute distress.     Appearance: Normal appearance.   Cardiovascular:      Rate and Rhythm: Normal rate.      Heart sounds: Murmur (holosystolic murmur) heard.      Comments: HD line in place  Pulmonary:      Effort: Pulmonary effort is normal. No respiratory distress. "      Breath sounds: Normal breath sounds.   Abdominal:      General: Bowel sounds are normal.      Tenderness: There is no abdominal tenderness.      Comments: Suprapubic catheter in place   Musculoskeletal:      Right lower leg: Edema present.      Left lower leg: Edema present.      Comments: Pitting edema bilaterally, improved from previous   Skin:     General: Skin is warm and dry.   Neurological:      General: No focal deficit present.      Mental Status: She is alert.   Psychiatric:         Mood and Affect: Mood normal.             Significant Labs: All pertinent labs within the past 24 hours have been reviewed.  CBC:   Recent Labs   Lab 08/08/23  0439 08/08/23  1302 08/09/23  0456   WBC 12.34 12.10 9.59   HGB 6.7* 7.5* 7.7*   HCT 20.6* 23.9* 23.4*    388 336     CMP:   Recent Labs   Lab 08/08/23 0439 08/09/23  0456   * 134*   K 3.4* 3.4*    104   CO2 13* 17*   * 127*   BUN 69* 48*   CREATININE 4.5* 3.6*   CALCIUM 7.6* 7.2*   PROT 4.9* 4.7*   ALBUMIN 2.1* 2.0*   BILITOT 0.2 0.2   ALKPHOS 81 73   AST 10 10   ALT 7* 6*   ANIONGAP 14 13     Magnesium:   Recent Labs   Lab 08/08/23 0439 08/09/23  0456   MG 1.5* 1.6     Vitamin D low    UCX (+) for Klebsiella and Proteus    Significant Imaging: I have reviewed all pertinent imaging results/findings within the past 24 hours.      Assessment/Plan:      * Acute kidney injury superimposed on CKD  Normal anion gap metabolic acidosis    Patient with acute kidney injury/acute renal failure likely due to nonadherence to dialysis and volume overload that is contributing the excessive load on kidneys. ZAK is currently improving. Baseline creatinine 2.2 - Labs reviewed- Renal function/electrolytes with Estimated Creatinine Clearance: 19 mL/min (A) (based on SCr of 3.6 mg/dL (H)). according to latest data. Monitor urine output and serial BMP and adjust therapy as needed. Avoid nephrotoxins and renally dose meds for GFR listed above.  Electrolyte  derangements and volume status were addressed medically at first, given her hemodynamic and acid/base stability in the ER.    - Per echo results from 08/07/23 EF 60% with normal systolic and estimated diastolic function, normal CVP: indicating extravascular volume overload  Tunnel cath placed on 8/7.  Pt restarted on HD on 8/8, with second HD session on 8/9.    Pt will need HD 3x/wk  Seeking outpatient HD chair at [facility] (where she was previously dialyzed, and which has a Ray lift)  Continuing to renally dose meds and avoid nephrotoxins  Continuing renal diet      Hyperphosphatemia  Current phos 6.4  Continue sevalemer TID  Daily CMP  WCTM        Bacteria in urine  Klebsiella and Proteus.  No  sxs of dysuria, increased frequency. Afebrile during admission.  WCTM      Uncomplicated opioid dependence  - continue home Roby for unbearable chronic back pain         Hypertension associated with diabetes  Patient's FSGs are controlled on current medication regimen.  Last A1c reviewed-   Lab Results   Component Value Date    HGBA1C 6.1 (H) 08/04/2023     Most recent fingerstick glucose reviewed-   Recent Labs   Lab 08/07/23  1956 08/08/23  1058 08/08/23  1131 08/08/23  1543   POCTGLUCOSE 202* 139* 140* 172*     Current correctional scale  Low  Maintain anti-hyperglycemic dose as follows-   Antihyperglycemics (From admission, onward)      Start     Stop Route Frequency Ordered    08/07/23 0835  insulin aspart U-100 pen 0-5 Units         -- SubQ Before meals & nightly PRN 08/07/23 0819    08/06/23 2100  insulin detemir U-100 (Levemir) pen 8 Units         -- SubQ Nightly 08/06/23 1920          Hold Oral hypoglycemics while patient is in the hospital.    - DC home clonidine (written to be taken when pt has headaches) given its transient nature  - initiated COREG 3.125 mg BID, titrating up  - initiated amlodipine 5 mg daily  - PRN hydralazine for SBP >180      BP Readings from Last 1 Encounters:   08/08/23 (!) 195/86          A-fib  - home dose Eliquis was 2.5mg BID --> titrated to 5mg BID for prevention of systemic embolism    Type 2 diabetes mellitus with stage 4 chronic kidney disease, with long-term current use of insulin  Patient's FSGs are controlled on current medication regimen.  Last A1c reviewed-   Lab Results   Component Value Date    HGBA1C 6.1 (H) 08/04/2023     Most recent fingerstick glucose reviewed-   Recent Labs   Lab 08/07/23  1956 08/08/23  1058 08/08/23  1131 08/08/23  1543   POCTGLUCOSE 202* 139* 140* 172*     Current correctional scale  Low  Maintain anti-hyperglycemic dose as follows-   Antihyperglycemics (From admission, onward)      Start     Stop Route Frequency Ordered    08/07/23 0835  insulin aspart U-100 pen 0-5 Units         -- SubQ Before meals & nightly PRN 08/07/23 0819    08/06/23 2100  insulin detemir U-100 (Levemir) pen 8 Units         -- SubQ Nightly 08/06/23 1920          Hold Oral hypoglycemics while patient is in the hospital. Home dose includes 15 units lantus, but following IP management.     PLAN:  - Goal blood glucose range while admitted: 140-180 per the NICE-SUGAR trial and American Diabetes Association guidelines  - Detemir 8 units qhs  - SSI with POCT accuchecks ACHS and Diabetic diet 1500 duyen with fluid restriction 1500mL  - Diabetic counseling and education (eg nutrition, insulin) to be given prior to discharge      Cervicogenic migraine  - Discontinued sumatriptan due to c/f elevated BP  - WCTM      Morbid obesity due to excess calories  Body mass index is 38.16 kg/m². Morbid obesity complicates all aspects of disease management from diagnostic modalities to treatment. Weight loss encouraged and health benefits explained to patient.         Suprapubic catheter  Neurogenic Bladder    Placed 7 yrs ago. Last changed 2 wks ago.       Neurogenic bladder        Normal anion gap metabolic acidosis  Improving w HD      VIJAYA (obstructive sleep apnea)  - CPAP ordered      Hyperlipidemia  associated with type 2 diabetes mellitus  - continue home atorvastatin      Hypothyroidism  - continue home synthroid        VTE Risk Mitigation (From admission, onward)           Ordered     heparin (porcine) injection 1,000 Units  As needed (PRN)         08/08/23 1353     apixaban tablet 5 mg  2 times daily         08/07/23 0941                    Discharge Planning   FLORENTINO: 8/11/2023     Code Status: Full Code   Is the patient medically ready for discharge?: Yes    Reason for patient still in hospital (select all that apply): Patient trending condition, Laboratory test, Treatment, Consult recommendations and Pending disposition  Discharge Plan A: Home with family   Discharge Delays: None known at this time              Junior Tobin MD  Department of Hospital Medicine   [facility] - Cardiology Stepdown

## 2023-08-09 NOTE — SUBJECTIVE & OBJECTIVE
Scheduled Meds:   amLODIPine  5 mg Oral Daily    anastrozole  1 mg Oral Daily    apixaban  5 mg Oral BID    atorvastatin  80 mg Oral Daily    carvediloL  6.25 mg Oral BID    ergocalciferol  50,000 Units Oral Q7 Days    furosemide  40 mg Oral Daily    insulin detemir U-100  8 Units Subcutaneous QHS    levothyroxine  50 mcg Oral Before breakfast    magnesium oxide  400 mg Oral Daily    methocarbamoL  1,000 mg Oral TID    mupirocin   Nasal BID    oxybutynin  10 mg Oral Daily    sevelamer carbonate  800 mg Oral TID WM    traZODone  100 mg Oral QHS     Continuous Infusions:  PRN Meds:dextrose 10%, dextrose 10%, glucagon (human recombinant), glucose, glucose, heparin (porcine), hydrALAZINE, HYDROcodone-acetaminophen, insulin aspart U-100, ondansetron    Review of patient's allergies indicates:  No Known Allergies     Past Medical History:   Diagnosis Date    Cervicogenic migraine     Chronic pain     CKD (chronic kidney disease) stage 4, GFR 15-29 ml/min     [facility]    CKD (chronic kidney disease) stage 4, GFR 15-29 ml/min     Diabetes mellitus     Long term use of Insulin, Diabetic Neuropathy    Dialysis patient 1/21/2022    Fibromyalgia     Hydronephrosis     Hyperlipidemia     Hypertension 12/12/2012    Hypothyroidism 12/12/2012    ARON (iron deficiency anemia)     Insomnia     Levoscoliosis     Malignant neoplasm of upper-outer quadrant of left breast in female, estrogen receptor positive     Metabolic acidosis     Mobility impaired     Nuclear sclerosis - Both Eyes 3/24/2014    VIJAYA (obstructive sleep apnea)     Osteopenia     Pulmonary nodule     Recurrent UTI     Renal manifestation of secondary diabetes mellitus     Secondary hyperparathyroidism, renal     Urinary retention      Past Surgical History:   Procedure Laterality Date    BIOPSY OF URETER Left 2/18/2022    Procedure: BIOPSY, URETER;  Surgeon: Ronel Whittington MD;  Location: [facility] OR [address] [place] [address];  Service: Urology;  Laterality: Left;    BREAST BIOPSY  Right     benign    CHOLECYSTECTOMY      COLONOSCOPY N/A 1/13/2017    Procedure: COLONOSCOPY;  Surgeon: Morris Wiseman MD;  Location: [facility] ENDO (4TH FLR);  Service: Endoscopy;  Laterality: N/A;  Renal pt Nephrology advised to avoid phosphate preps    CYSTOSCOPY N/A 10/8/2018    Procedure: CYSTOSCOPY;  Surgeon: Ronel Whittington MD;  Location: [facility] OR 1ST FLR;  Service: Urology;  Laterality: N/A;  45 min    CYSTOSCOPY N/A 3/25/2019    Procedure: CYSTOSCOPY;  Surgeon: Ronel Whittington MD;  Location: [facility] OR 1ST FLR;  Service: Urology;  Laterality: N/A;  45 min    CYSTOSCOPY N/A 8/26/2019    Procedure: CYSTOSCOPY;  Surgeon: Ronel Whittington MD;  Location: [facility] OR 1ST FLR;  Service: Urology;  Laterality: N/A;  45 min    CYSTOSCOPY N/A 7/2/2021    Procedure: CYSTOSCOPY;  Surgeon: Ronel Whittington MD;  Location: [facility] OR 1ST FLR;  Service: Urology;  Laterality: N/A;    CYSTOSCOPY  12/23/2021    Procedure: CYSTOSCOPY;  Surgeon: Ronel Whittington MD;  Location: [facility] OR 1ST FLR;  Service: Urology;;    CYSTOSCOPY  2/18/2022    Procedure: CYSTOSCOPY;  Surgeon: Ronel Whittington MD;  Location: [facility] OR 1ST FLR;  Service: Urology;;    CYSTOSCOPY W/ URETERAL STENT PLACEMENT Left 5/15/2021    Procedure: CYSTOSCOPY, WITH URETERAL STENT INSERTION;  Surgeon: Levy Sánchez Jr., MD;  Location: [facility] OR 1ST FLR;  Service: Urology;  Laterality: Left;    CYSTOSCOPY WITH BIOPSY OF BLADDER N/A 1/27/2020    Procedure: CYSTOSCOPY, WITH BLADDER BIOPSY, WITH FULGURATION IF INDICATED;  Surgeon: Ronel Whittington MD;  Location: [facility] OR 1ST FLR;  Service: Urology;  Laterality: N/A;    DILATION AND CURETTAGE OF UTERUS      GALLBLADDER SURGERY  2006    HYSTERECTOMY      INJECTION FOR SENTINEL NODE IDENTIFICATION Left 8/1/2019    Procedure: INJECTION, FOR SENTINEL NODE IDENTIFICATION;  Surgeon: Samia Fulton MD;  Location: [facility] OR 2ND FLR;  Service: General;  Laterality: Left;    INJECTION OF BOTULINUM TOXIN  TYPE A N/A 10/8/2018    Procedure: INJECTION, BOTULINUM TOXIN, TYPE A 300 UNITS;  Surgeon: Ronel Whittington MD;  Location: [facility] OR [address] [place] [address];  Service: Urology;  Laterality: N/A;    INJECTION OF BOTULINUM TOXIN TYPE A N/A 3/25/2019    Procedure: INJECTION, BOTULINUM TOXIN, TYPE A 300 UNITS;  Surgeon: Ronel Whittington MD;  [facility];  Service: Urology;  Laterality: N/A;    INJECTION OF BOTULINUM TOXIN TYPE A N/A 8/26/2019    Procedure: INJECTION, BOTULINUM TOXIN, TYPE A 300 UNITS;  Surgeon: Ronel Whittington MD;  Location: [facility] OR [address] [place] [address];  Service: Urology;  Laterality: N/A;    MASTECTOMY Left 8/1/2019    Procedure: MASTECTOMY 23 hour stay;  Surgeon: Samia Fulton MD;  Location: [facility] OR [address] [place] [address];  Service: General;  Laterality: Left;    MEDIPORT REMOVAL Right 6/24/2022    Procedure: REMOVAL, CATHETER, CENTRAL VENOUS, TUNNELED, WITH PORT;  Surgeon: Isauro Anderson MD;  Location: [facility] CATH LAB;  Service: Radiology;  Laterality: Right;    OVARIAN CYST SURGERY  1985    REPLACEMENT OF STENT Left 12/23/2021    Procedure: REPLACEMENT, STENT;  Surgeon: Ronel Whittington MD;  Location: [facility] OR [address] [place] [address];  Service: Urology;  Laterality: Left;    REPLACEMENT OF STENT Left 2/18/2022    Procedure: REPLACEMENT, STENT;  Surgeon: Ronel Whittington MD;  Location: [facility] OR [address] [place] [address];  Service: Urology;  Laterality: Left;    RETROGRADE PYELOGRAPHY Left 2/18/2022    Procedure: PYELOGRAM, RETROGRADE;  Surgeon: Ronel Whittington MD;  Location: [facility] OR [address] [place] [address];  Service: Urology;  Laterality: Left;    SENTINEL LYMPH NODE BIOPSY Left 8/1/2019    Procedure: BIOPSY, LYMPH NODE, SENTINEL;  Surgeon: Samia Fulton MD;  Location: [facility] OR 2ND FLR;  Service: General;  Laterality: Left;    spt placement      TONSILLECTOMY, ADENOIDECTOMY      TOTAL ABDOMINAL HYSTERECTOMY W/ BILATERAL SALPINGOOPHORECTOMY  1985    URETEROSCOPY Left 2/18/2022    Procedure: URETEROSCOPY;  Surgeon: Ronel Whittington MD;   Location: [facility] OR [address] [place] [address];  Service: Urology;  Laterality: Left;       Family History       Problem Relation (Age of Onset)    ALS Mother    Cancer Maternal Grandmother, Paternal Grandfather, Brother    Diabetes Sister, Maternal Aunt, Maternal Uncle    Kidney disease Sister, Maternal Aunt    Prostate cancer Brother    Scoliosis Brother          Tobacco Use    Smoking status: Never    Smokeless tobacco: Never   Substance and Sexual Activity    Alcohol use: No     Alcohol/week: 0.0 standard drinks of alcohol    Drug use: No    Sexual activity: Not Currently     Birth control/protection: Abstinence     Review of Systems   Skin:  Positive for wound.       Objective:     Vital Signs (Most Recent):  Temp: 97.4 °F (36.3 °C) (08/09/23 1154)  Pulse: 80 (08/09/23 1154)  Resp: 18 (08/09/23 1154)  BP: (!) 186/80 (08/09/23 1201)  SpO2: 97 % (08/09/23 1154) Vital Signs (24h Range):  Temp:  [97.4 °F (36.3 °C)-99 °F (37.2 °C)] 97.4 °F (36.3 °C)  Pulse:  [71-92] 80  Resp:  [16-19] 18  SpO2:  [94 %-99 %] 97 %  BP: (111-195)/(53-86) 186/80     Weight: 113.9 kg (251 lb)  Body mass index is 38.16 kg/m².     Physical Exam  Constitutional:       Appearance: Normal appearance.   Skin:     General: Skin is warm and dry.      Findings: Lesion present.   Neurological:      Mental Status: She is alert.          Laboratory:  All pertinent labs reviewed within the last 24 hours.    Diagnostic Results:  None

## 2023-08-09 NOTE — SUBJECTIVE & OBJECTIVE
Interval History: RFP improved after dialysis yesterday. No acute events during session yesterday, will repeat today. , will order vitamin D today. Some elevated blood pressures in the last 24 hours, will monitor. Patient with no questions or concerns today.    Review of patient's allergies indicates:  No Known Allergies  Current Facility-Administered Medications   Medication Frequency    amLODIPine tablet 5 mg Daily    anastrozole tablet 1 mg Daily    apixaban tablet 5 mg BID    atorvastatin tablet 80 mg Daily    carvediloL tablet 6.25 mg BID    dextrose 10% bolus 125 mL 125 mL PRN    dextrose 10% bolus 250 mL 250 mL PRN    ergocalciferol capsule 50,000 Units Q7 Days    furosemide tablet 40 mg Daily    glucagon (human recombinant) injection 1 mg PRN    glucose chewable tablet 16 g PRN    glucose chewable tablet 24 g PRN    heparin (porcine) injection 1,000 Units PRN    hydrALAZINE tablet 50 mg Q8H PRN    HYDROcodone-acetaminophen  mg per tablet 1 tablet Q6H PRN    insulin aspart U-100 pen 0-5 Units QID (AC + HS) PRN    insulin detemir U-100 (Levemir) pen 8 Units QHS    levothyroxine tablet 50 mcg Before breakfast    methocarbamoL tablet 1,000 mg TID    mupirocin 2 % ointment BID    ondansetron disintegrating tablet 8 mg Q12H PRN    oxybutynin 24 hr tablet 10 mg Daily    sevelamer carbonate tablet 800 mg TID WM    traZODone tablet 100 mg QHS       Objective:     Vital Signs (Most Recent):  Temp: 99 °F (37.2 °C) (08/09/23 0710)  Pulse: 81 (08/09/23 1111)  Resp: 18 (08/09/23 0710)  BP: (!) 175/81 (08/09/23 0710)  SpO2: (!) 94 % (08/09/23 0850) Vital Signs (24h Range):  Temp:  [97.6 °F (36.4 °C)-99 °F (37.2 °C)] 99 °F (37.2 °C)  Pulse:  [71-92] 81  Resp:  [16-19] 18  SpO2:  [94 %-99 %] 94 %  BP: (111-195)/(53-86) 175/81     Weight: 113.9 kg (251 lb) (08/07/23 0937)  Body mass index is 38.16 kg/m².  Body surface area is 2.34 meters squared.    I/O last 3 completed shifts:  In: 1559.8 [P.O.:760; I.V.:199.8;  Blood:400; Other:200]  Out: 3700 [Urine:2275; Other:1425]     Physical Exam  Constitutional:       General: She is not in acute distress.     Appearance: Normal appearance.   Cardiovascular:      Rate and Rhythm: Normal rate.      Heart sounds: Murmur (holosystolic murmur) heard.      Comments: HD line in place  Pulmonary:      Effort: Pulmonary effort is normal. No respiratory distress.      Breath sounds: Normal breath sounds.   Abdominal:      General: Bowel sounds are normal.      Tenderness: There is no abdominal tenderness.      Comments: Suprapubic catheter in place   Musculoskeletal:      Right lower leg: Edema present.      Left lower leg: Edema present.      Comments: Pitting edema bilaterally, improved from previous   Skin:     General: Skin is warm and dry.   Neurological:      General: No focal deficit present.      Mental Status: She is alert.   Psychiatric:         Mood and Affect: Mood normal.          Significant Labs:  CBC:   Recent Labs   Lab 08/09/23  0456   WBC 9.59   RBC 2.77*   HGB 7.7*   HCT 23.4*      MCV 85   MCH 27.8   MCHC 32.9     CMP:   Recent Labs   Lab 08/09/23  0456   *   CALCIUM 7.2*   ALBUMIN 2.0*   PROT 4.7*   *   K 3.4*   CO2 17*      BUN 48*   CREATININE 3.6*   ALKPHOS 73   ALT 6*   AST 10   BILITOT 0.2     All labs within the past 24 hours have been reviewed.     Significant Imaging:  Labs: Reviewed

## 2023-08-09 NOTE — ASSESSMENT & PLAN NOTE
Klebsiella and Proteus.  No  sxs of dysuria, increased frequency. Afebrile during admission.  WCTM

## 2023-08-09 NOTE — PROGRESS NOTES
"[facility]  Nephrology  Progress Note    Patient Name: Cecile Bowen  MRN: [id number]  Admission Date: 8/6/2023  Hospital Length of Stay: 3 days  Attending Provider: Meagan Templeton*   Primary Care Physician: Lurdes Navarro MD  Principal Problem:Acute kidney injury superimposed on CKD    Subjective:     HPI: 73 year old woman with CKD4 (BLCr 2.2), T2DM, Afib, neurogenic bladder with suprapubic catheter, hypothyroidism, HLD presented to the ED after critical labs were identified by her PCP (Bicarb < 5). She was told to present to the ED on Friday but did not present until Sunday because she "felt ok". Her bicarb was 7 on admission, Cr was 4.6. Nephrology was consulted for ZAK on CKD4. Of note, patient was on dialysis for about 6 months about a year ago, but was told "her kidneys woke up" so she was able to come off of it. She was initially against restarting dialysis unless absolutely necessary but today she spoke with her sister and is open to starting HD.      Interval History: RFP improved after dialysis yesterday. No acute events during session yesterday, will repeat today. , will order vitamin D today. Some elevated blood pressures in the last 24 hours, will monitor. Patient with no questions or concerns today.    Review of patient's allergies indicates:  No Known Allergies  Current Facility-Administered Medications   Medication Frequency    amLODIPine tablet 5 mg Daily    anastrozole tablet 1 mg Daily    apixaban tablet 5 mg BID    atorvastatin tablet 80 mg Daily    carvediloL tablet 6.25 mg BID    dextrose 10% bolus 125 mL 125 mL PRN    dextrose 10% bolus 250 mL 250 mL PRN    ergocalciferol capsule 50,000 Units Q7 Days    furosemide tablet 40 mg Daily    glucagon (human recombinant) injection 1 mg PRN    glucose chewable tablet 16 g PRN    glucose chewable tablet 24 g PRN    heparin (porcine) injection 1,000 Units PRN    hydrALAZINE tablet 50 mg Q8H " PRN    HYDROcodone-acetaminophen  mg per tablet 1 tablet Q6H PRN    insulin aspart U-100 pen 0-5 Units QID (AC + HS) PRN    insulin detemir U-100 (Levemir) pen 8 Units QHS    levothyroxine tablet 50 mcg Before breakfast    methocarbamoL tablet 1,000 mg TID    mupirocin 2 % ointment BID    ondansetron disintegrating tablet 8 mg Q12H PRN    oxybutynin 24 hr tablet 10 mg Daily    sevelamer carbonate tablet 800 mg TID WM    traZODone tablet 100 mg QHS       Objective:     Vital Signs (Most Recent):  Temp: 99 °F (37.2 °C) (08/09/23 0710)  Pulse: 81 (08/09/23 1111)  Resp: 18 (08/09/23 0710)  BP: (!) 175/81 (08/09/23 0710)  SpO2: (!) 94 % (08/09/23 0850) Vital Signs (24h Range):  Temp:  [97.6 °F (36.4 °C)-99 °F (37.2 °C)] 99 °F (37.2 °C)  Pulse:  [71-92] 81  Resp:  [16-19] 18  SpO2:  [94 %-99 %] 94 %  BP: (111-195)/(53-86) 175/81     Weight: 113.9 kg (251 lb) (08/07/23 0937)  Body mass index is 38.16 kg/m².  Body surface area is 2.34 meters squared.    I/O last 3 completed shifts:  In: 1559.8 [P.O.:760; I.V.:199.8; Blood:400; Other:200]  Out: 3700 [Urine:2275; Other:1425]     Physical Exam  Constitutional:       General: She is not in acute distress.     Appearance: Normal appearance.   Cardiovascular:      Rate and Rhythm: Normal rate.      Heart sounds: Murmur (holosystolic murmur) heard.      Comments: HD line in place  Pulmonary:      Effort: Pulmonary effort is normal. No respiratory distress.      Breath sounds: Normal breath sounds.   Abdominal:      General: Bowel sounds are normal.      Tenderness: There is no abdominal tenderness.      Comments: Suprapubic catheter in place   Musculoskeletal:      Right lower leg: Edema present.      Left lower leg: Edema present.      Comments: Pitting edema bilaterally, improved from previous   Skin:     General: Skin is warm and dry.   Neurological:      General: No focal deficit present.      Mental Status: She is alert.   Psychiatric:         Mood and Affect:  Mood normal.          Significant Labs:  CBC:   Recent Labs   Lab 08/09/23  0456   WBC 9.59   RBC 2.77*   HGB 7.7*   HCT 23.4*      MCV 85   MCH 27.8   MCHC 32.9     CMP:   Recent Labs   Lab 08/09/23  0456   *   CALCIUM 7.2*   ALBUMIN 2.0*   PROT 4.7*   *   K 3.4*   CO2 17*      BUN 48*   CREATININE 3.6*   ALKPHOS 73   ALT 6*   AST 10   BILITOT 0.2     All labs within the past 24 hours have been reviewed.     Significant Imaging:  Labs: Reviewed    Assessment/Plan:     Renal/  * Acute kidney injury superimposed on CKD  Patient with CKD4 (Cr Bl 2.2) previously on dialysis, has not undergone HD in several months. She was sent to the ED by her PCP due to bicarb < 5. On admission, bicarb was 7, Cr was 4.7. Patient was started on bicarb drip with increase in bicarb to 13. VBG with pH 7.2. Patient received 80 of lasix with 1650cc urine output. Urine Na=32, UPCR=5. Etiology ZAK vs progression of CKD. Patient underwent HD yesterday with improvement in RFP.    Acid/base: Metabolic acidosis likely secondary to renal failure and inability to filter H+  ZAK: ZAK of unknown etiology vs CKD progression  MBD: PTH = 196, Corrected Ca = 8.8  Anemia: Hgb of 7.7 today, consider anemia workup  Volume status: Difficult to assess but patient is breathing well on RA and reports LE edema is improving    Recommendations:   - Dialysis session 2 today  - Suggest anemia workup   - , order vitamin D today  - Continue sevelemer 800 TID AC  - Replete to keep K ~ 4   - Renally dose meds  - Keep blood pressure as stable as possible, will monitor  - Avoid contrast with imaging   - Strict I/Os        Thank you for your consult. I will follow-up with patient. Please contact us if you have any additional questions.    Ines Romano MD  Nephrology  [facility]

## 2023-08-09 NOTE — PROGRESS NOTES
Per [facility] LESLY JOHNSON pt's HD referral currently waiting for review from [facility] medical director.    SW will continue to follow for updates.    Maru Mendoza, MARYJO  [facility]  [phone]

## 2023-08-09 NOTE — PROGRESS NOTES
08/09/23 1610   Post-Hemodialysis Assessment   Rinseback Volume (mL) 300 mL   Blood Volume Processed (Liters) 35.3 L   Dialyzer Clearance Lightly streaked   Duration of Treatment 120 minutes   Additional Fluid Intake (mL) 200 mL   Total UF (mL) 1059 mL   Net Fluid Removal 559   Patient Response to Treatment tolerated well   Post-Hemodialysis Comments see notes  (arrived via bed)     HD TX complete. Pt tolerated well. Net removal 559 ml. Pt AAO, VSS. NAD. Report given to primary nurse. Pt returned to room via bed escorted by pt transport.

## 2023-08-09 NOTE — NURSING
AAOX4,VSS,O2 sats>92% on room air. Plan of care discussed with patient.Patient has no complaints of pain/SOB. Patient turned and cleaned as needed to prevent skin breadown. Discussed medications and care. Patient has no questions at this time.Pt visualised and stable.Call light within reach.Pt resting,bed at lowest position.

## 2023-08-09 NOTE — PROGRESS NOTES
08/09/23 1346   Vital Signs   Pulse 94   BP (!) 187/79   MAP (mmHg) 114       Pt's BP elevated still to 187/79 after hydralazine PO administration. MD Jareth notified. Patient transferred to dialysis via bed by [facility] transport. Dialysis RN aware of patient's elevated blood pressure.

## 2023-08-09 NOTE — SUBJECTIVE & OBJECTIVE
Interval History:   Tolerated 1st HD session well.  2nd session today.  BP remains elevated; increasing Coreg dose and starting amlodipine 5 qhs  Anticipate DC home if she gets OP HD chair at [facility].    Objective:     Vital Signs (Most Recent):  Temp: 97.4 °F (36.3 °C) (08/09/23 1154)  Pulse: 94 (08/09/23 1530)  Resp: 18 (08/09/23 1154)  BP: (!) 174/74 (08/09/23 1530)  SpO2: 97 % (08/09/23 1154) Vital Signs (24h Range):  Temp:  [97.4 °F (36.3 °C)-99 °F (37.2 °C)] 97.4 °F (36.3 °C)  Pulse:  [71-97] 94  Resp:  [16-19] 18  SpO2:  [94 %-99 %] 97 %  BP: (126-195)/(60-86) 174/74     Weight: 113.9 kg (251 lb)  Body mass index is 38.16 kg/m².    Intake/Output Summary (Last 24 hours) at 8/9/2023 1547  Last data filed at 8/9/2023 1302  Gross per 24 hour   Intake 1101.8 ml   Output 3750 ml   Net -2648.2 ml         Physical Exam  Constitutional:       General: She is not in acute distress.     Appearance: Normal appearance.   Cardiovascular:      Rate and Rhythm: Normal rate.      Heart sounds: Murmur (holosystolic murmur) heard.      Comments: HD line in place  Pulmonary:      Effort: Pulmonary effort is normal. No respiratory distress.      Breath sounds: Normal breath sounds.   Abdominal:      General: Bowel sounds are normal.      Tenderness: There is no abdominal tenderness.      Comments: Suprapubic catheter in place   Musculoskeletal:      Right lower leg: Edema present.      Left lower leg: Edema present.      Comments: Pitting edema bilaterally, improved from previous   Skin:     General: Skin is warm and dry.   Neurological:      General: No focal deficit present.      Mental Status: She is alert.   Psychiatric:         Mood and Affect: Mood normal.             Significant Labs: All pertinent labs within the past 24 hours have been reviewed.  CBC:   Recent Labs   Lab 08/08/23  0439 08/08/23  1302 08/09/23  0456   WBC 12.34 12.10 9.59   HGB 6.7* 7.5* 7.7*   HCT 20.6* 23.9* 23.4*    388 336     CMP:   Recent Labs    Lab 08/08/23 0439 08/09/23  0456   * 134*   K 3.4* 3.4*    104   CO2 13* 17*   * 127*   BUN 69* 48*   CREATININE 4.5* 3.6*   CALCIUM 7.6* 7.2*   PROT 4.9* 4.7*   ALBUMIN 2.1* 2.0*   BILITOT 0.2 0.2   ALKPHOS 81 73   AST 10 10   ALT 7* 6*   ANIONGAP 14 13     Magnesium:   Recent Labs   Lab 08/08/23 0439 08/09/23  0456   MG 1.5* 1.6     Vitamin D low    UCX (+) for Klebsiella and Proteus    Significant Imaging: I have reviewed all pertinent imaging results/findings within the past 24 hours.

## 2023-08-09 NOTE — ASSESSMENT & PLAN NOTE
Patient's FSGs are controlled on current medication regimen.  Last A1c reviewed-   Lab Results   Component Value Date    HGBA1C 6.1 (H) 08/04/2023     Most recent fingerstick glucose reviewed-   Recent Labs   Lab 08/08/23 2004 08/09/23  0757 08/09/23  1209   POCTGLUCOSE 205* 137* 215*     Current correctional scale  Low  Maintain anti-hyperglycemic dose as follows-   Antihyperglycemics (From admission, onward)    Start     Stop Route Frequency Ordered    08/07/23 0835  insulin aspart U-100 pen 0-5 Units         -- SubQ Before meals & nightly PRN 08/07/23 0819    08/06/23 2100  insulin detemir U-100 (Levemir) pen 8 Units         -- SubQ Nightly 08/06/23 1920        Hold Oral hypoglycemics while patient is in the hospital.      - DC home clonidine (written to be taken when pt has headaches) given its transient nature  - initiated COREG 6.25 mg BID, titrating up  - transitioned from amlodipine 5 mg daily to nifedipine 30mg  - PRN hydralazine for SBP >180      BP Readings from Last 1 Encounters:   08/09/23 (!) 174/78

## 2023-08-09 NOTE — PROGRESS NOTES
Pt arrived to [facility] unit via bed. Contact isolation precautions. Pt AAO, VSS. HD started to right chest wall cath. NAD.

## 2023-08-09 NOTE — HPI
"Cecile Bowen is 73 year old female with T2DM on insulin, Afib on Eliquis, neurogenic bladder with suprapubic catheter, CKD4 previously on dialysis, HLD, hypothyroidism, migraine, chronic pain who [place] [facility] [place] critical labs from her PCP visit. PCP instructed pt on Friday 8/04 to present to ED for bicarb less than 5 and ZAK. She felt like she was fine and waited till Sunday 08/06 to present to ED. She reports "feeling low physically" and that's what prompted her to come in. Reports good hydration but poor oral nutrition; not much food in the house. She has a suprapubic catheter that was placed 7 years ago, and was recently changed 2 wks ago, per pt. Denies smoking use or hx, alcohol use or other use of illict drugs. Reports chronic constipation, chronic back pain, intermittent productive cough, weakness, chronic bilateral floaters and lightheadedness. She is wheelchair bound because she reports no muscle tone in legs. Reports urinating well. Cannot account for dysuria as pt has catheter in place, but does report recently foul smelling urine. Denies CP, SOB, unexpected weight loss, fever, abdominal pain, diarrhea. Denies any recent changes to medications or any sick contacts. Denies recent falls; last fall was one year w/o head trauma. Pt admitted to hospital medicine service for further management. Skin integrity AUGUSTO consulted for evaluation of skin injuries.  "

## 2023-08-10 LAB
ALBUMIN SERPL BCP-MCNC: 2 G/DL (ref 3.5–5.2)
ALP SERPL-CCNC: 67 U/L (ref 55–135)
ALT SERPL W/O P-5'-P-CCNC: <5 U/L (ref 10–44)
ANION GAP SERPL CALC-SCNC: 11 MMOL/L (ref 8–16)
AST SERPL-CCNC: 10 U/L (ref 10–40)
BACTERIA UR CULT: ABNORMAL
BACTERIA UR CULT: ABNORMAL
BASOPHILS # BLD AUTO: 0.06 K/UL (ref 0–0.2)
BASOPHILS NFR BLD: 0.6 % (ref 0–1.9)
BILIRUB SERPL-MCNC: 0.2 MG/DL (ref 0.1–1)
BUN SERPL-MCNC: 38 MG/DL (ref 8–23)
CALCIUM SERPL-MCNC: 7.1 MG/DL (ref 8.7–10.5)
CHLORIDE SERPL-SCNC: 106 MMOL/L (ref 95–110)
CO2 SERPL-SCNC: 19 MMOL/L (ref 23–29)
CREAT SERPL-MCNC: 2.6 MG/DL (ref 0.5–1.4)
DIFFERENTIAL METHOD: ABNORMAL
EOSINOPHIL # BLD AUTO: 0.3 K/UL (ref 0–0.5)
EOSINOPHIL NFR BLD: 3.2 % (ref 0–8)
ERYTHROCYTE [DISTWIDTH] IN BLOOD BY AUTOMATED COUNT: 16.1 % (ref 11.5–14.5)
EST. GFR  (NO RACE VARIABLE): 19.1 ML/MIN/1.73 M^2
FERRITIN SERPL-MCNC: 254 NG/ML (ref 20–300)
FOLATE SERPL-MCNC: 4.1 NG/ML (ref 4–24)
GLUCOSE SERPL-MCNC: 128 MG/DL (ref 70–110)
HCT VFR BLD AUTO: 23.6 % (ref 37–48.5)
HGB BLD-MCNC: 7.3 G/DL (ref 12–16)
IMM GRANULOCYTES # BLD AUTO: 0.16 K/UL (ref 0–0.04)
IMM GRANULOCYTES NFR BLD AUTO: 1.6 % (ref 0–0.5)
IRON SERPL-MCNC: 43 UG/DL (ref 30–160)
LYMPHOCYTES # BLD AUTO: 2.6 K/UL (ref 1–4.8)
LYMPHOCYTES NFR BLD: 27.1 % (ref 18–48)
MAGNESIUM SERPL-MCNC: 1.7 MG/DL (ref 1.6–2.6)
MCH RBC QN AUTO: 26.4 PG (ref 27–31)
MCHC RBC AUTO-ENTMCNC: 30.9 G/DL (ref 32–36)
MCV RBC AUTO: 86 FL (ref 82–98)
MONOCYTES # BLD AUTO: 1 K/UL (ref 0.3–1)
MONOCYTES NFR BLD: 10 % (ref 4–15)
NEUTROPHILS # BLD AUTO: 5.6 K/UL (ref 1.8–7.7)
NEUTROPHILS NFR BLD: 57.5 % (ref 38–73)
NRBC BLD-RTO: 0 /100 WBC
PHOSPHATE SERPL-MCNC: 3.2 MG/DL (ref 2.7–4.5)
PLATELET # BLD AUTO: 267 K/UL (ref 150–450)
PMV BLD AUTO: 8.7 FL (ref 9.2–12.9)
POCT GLUCOSE: 126 MG/DL (ref 70–110)
POCT GLUCOSE: 127 MG/DL (ref 70–110)
POCT GLUCOSE: 177 MG/DL (ref 70–110)
POCT GLUCOSE: 238 MG/DL (ref 70–110)
POCT GLUCOSE: 267 MG/DL (ref 70–110)
POTASSIUM SERPL-SCNC: 3.9 MMOL/L (ref 3.5–5.1)
PROT SERPL-MCNC: 4.5 G/DL (ref 6–8.4)
RBC # BLD AUTO: 2.76 M/UL (ref 4–5.4)
SATURATED IRON: 24 % (ref 20–50)
SODIUM SERPL-SCNC: 136 MMOL/L (ref 136–145)
TOTAL IRON BINDING CAPACITY: 182 UG/DL (ref 250–450)
TRANSFERRIN SERPL-MCNC: 123 MG/DL (ref 200–375)
VIT B12 SERPL-MCNC: 402 PG/ML (ref 210–950)
WBC # BLD AUTO: 9.74 K/UL (ref 3.9–12.7)

## 2023-08-10 PROCEDURE — 36415 COLL VENOUS BLD VENIPUNCTURE: CPT | Mod: HCNC

## 2023-08-10 PROCEDURE — 25000003 PHARM REV CODE 250: Mod: HCNC | Performed by: STUDENT IN AN ORGANIZED HEALTH CARE EDUCATION/TRAINING PROGRAM

## 2023-08-10 PROCEDURE — 83735 ASSAY OF MAGNESIUM: CPT | Mod: HCNC

## 2023-08-10 PROCEDURE — 63600175 PHARM REV CODE 636 W HCPCS: Mod: HCNC | Performed by: STUDENT IN AN ORGANIZED HEALTH CARE EDUCATION/TRAINING PROGRAM

## 2023-08-10 PROCEDURE — 82746 ASSAY OF FOLIC ACID SERUM: CPT | Mod: HCNC

## 2023-08-10 PROCEDURE — 82607 VITAMIN B-12: CPT | Mod: HCNC

## 2023-08-10 PROCEDURE — 82728 ASSAY OF FERRITIN: CPT | Mod: HCNC

## 2023-08-10 PROCEDURE — 80053 COMPREHEN METABOLIC PANEL: CPT | Mod: HCNC

## 2023-08-10 PROCEDURE — 25000003 PHARM REV CODE 250: Mod: HCNC

## 2023-08-10 PROCEDURE — 94660 CPAP INITIATION&MGMT: CPT | Mod: HCNC

## 2023-08-10 PROCEDURE — 84100 ASSAY OF PHOSPHORUS: CPT | Mod: HCNC

## 2023-08-10 PROCEDURE — 99233 PR SUBSEQUENT HOSPITAL CARE,LEVL III: ICD-10-PCS | Mod: HCNC,GC,, | Performed by: STUDENT IN AN ORGANIZED HEALTH CARE EDUCATION/TRAINING PROGRAM

## 2023-08-10 PROCEDURE — 25000003 PHARM REV CODE 250: Mod: HCNC | Performed by: HOSPITALIST

## 2023-08-10 PROCEDURE — 99900035 HC TECH TIME PER 15 MIN (STAT): Mod: HCNC

## 2023-08-10 PROCEDURE — 99233 SBSQ HOSP IP/OBS HIGH 50: CPT | Mod: HCNC,GC,, | Performed by: STUDENT IN AN ORGANIZED HEALTH CARE EDUCATION/TRAINING PROGRAM

## 2023-08-10 PROCEDURE — 20600001 HC STEP DOWN PRIVATE ROOM: Mod: HCNC

## 2023-08-10 PROCEDURE — 27000207 HC ISOLATION: Mod: HCNC

## 2023-08-10 PROCEDURE — 80100014 HC HEMODIALYSIS 1:1: Mod: HCNC

## 2023-08-10 PROCEDURE — 84466 ASSAY OF TRANSFERRIN: CPT | Mod: HCNC

## 2023-08-10 PROCEDURE — C1752 CATH,HEMODIALYSIS,SHORT-TERM: HCPCS | Mod: HCNC

## 2023-08-10 PROCEDURE — 94761 N-INVAS EAR/PLS OXIMETRY MLT: CPT | Mod: HCNC

## 2023-08-10 PROCEDURE — 85025 COMPLETE CBC W/AUTO DIFF WBC: CPT | Mod: HCNC

## 2023-08-10 RX ORDER — ACETAMINOPHEN 325 MG/1
650 TABLET ORAL EVERY 6 HOURS PRN
Status: DISCONTINUED | OUTPATIENT
Start: 2023-08-10 | End: 2023-08-12 | Stop reason: HOSPADM

## 2023-08-10 RX ORDER — NIFEDIPINE 30 MG/1
30 TABLET, EXTENDED RELEASE ORAL DAILY
Status: DISCONTINUED | OUTPATIENT
Start: 2023-08-10 | End: 2023-08-11

## 2023-08-10 RX ORDER — NIFEDIPINE 30 MG/1
30 TABLET, EXTENDED RELEASE ORAL DAILY
Status: DISCONTINUED | OUTPATIENT
Start: 2023-08-11 | End: 2023-08-10

## 2023-08-10 RX ORDER — SODIUM CHLORIDE 9 MG/ML
INJECTION, SOLUTION INTRAVENOUS
Status: CANCELLED | OUTPATIENT
Start: 2023-08-10

## 2023-08-10 RX ORDER — SODIUM CHLORIDE 9 MG/ML
INJECTION, SOLUTION INTRAVENOUS ONCE
Status: CANCELLED | OUTPATIENT
Start: 2023-08-10 | End: 2023-08-10

## 2023-08-10 RX ADMIN — APIXABAN 5 MG: 5 TABLET, FILM COATED ORAL at 09:08

## 2023-08-10 RX ADMIN — LEVOTHYROXINE SODIUM 50 MCG: 50 TABLET ORAL at 05:08

## 2023-08-10 RX ADMIN — HYDRALAZINE HYDROCHLORIDE 50 MG: 50 TABLET ORAL at 03:08

## 2023-08-10 RX ADMIN — Medication 400 MG: at 09:08

## 2023-08-10 RX ADMIN — NIFEDIPINE 30 MG: 30 TABLET, FILM COATED, EXTENDED RELEASE ORAL at 05:08

## 2023-08-10 RX ADMIN — HEPARIN SODIUM 1000 UNITS: 1000 INJECTION, SOLUTION INTRAVENOUS; SUBCUTANEOUS at 04:08

## 2023-08-10 RX ADMIN — INSULIN DETEMIR 8 UNITS: 100 INJECTION, SOLUTION SUBCUTANEOUS at 09:08

## 2023-08-10 RX ADMIN — AMLODIPINE BESYLATE 5 MG: 5 TABLET ORAL at 09:08

## 2023-08-10 RX ADMIN — CARVEDILOL 6.25 MG: 6.25 TABLET, FILM COATED ORAL at 09:08

## 2023-08-10 RX ADMIN — INSULIN ASPART 1 UNITS: 100 INJECTION, SOLUTION INTRAVENOUS; SUBCUTANEOUS at 09:08

## 2023-08-10 RX ADMIN — METHOCARBAMOL 1000 MG: 750 TABLET, FILM COATED ORAL at 05:08

## 2023-08-10 RX ADMIN — SEVELAMER CARBONATE 800 MG: 800 TABLET, FILM COATED ORAL at 11:08

## 2023-08-10 RX ADMIN — ANASTROZOLE 1 MG: 1 TABLET, COATED ORAL at 09:08

## 2023-08-10 RX ADMIN — TRAZODONE HYDROCHLORIDE 100 MG: 50 TABLET ORAL at 09:08

## 2023-08-10 RX ADMIN — ONDANSETRON 8 MG: 8 TABLET, ORALLY DISINTEGRATING ORAL at 02:08

## 2023-08-10 RX ADMIN — SEVELAMER CARBONATE 800 MG: 800 TABLET, FILM COATED ORAL at 05:08

## 2023-08-10 RX ADMIN — ATORVASTATIN CALCIUM 80 MG: 20 TABLET, FILM COATED ORAL at 09:08

## 2023-08-10 RX ADMIN — OXYBUTYNIN CHLORIDE 10 MG: 5 TABLET, EXTENDED RELEASE ORAL at 09:08

## 2023-08-10 RX ADMIN — HYDROCODONE BITARTRATE AND ACETAMINOPHEN 1 TABLET: 10; 325 TABLET ORAL at 02:08

## 2023-08-10 RX ADMIN — METHOCARBAMOL 1000 MG: 750 TABLET, FILM COATED ORAL at 09:08

## 2023-08-10 RX ADMIN — MUPIROCIN: 20 OINTMENT TOPICAL at 09:08

## 2023-08-10 RX ADMIN — FUROSEMIDE 40 MG: 40 TABLET ORAL at 09:08

## 2023-08-10 RX ADMIN — SEVELAMER CARBONATE 800 MG: 800 TABLET, FILM COATED ORAL at 09:08

## 2023-08-10 NOTE — PLAN OF CARE
Plan of care reviewed with patient. Patient is AOX4 and VS stable. Patient remained free of falls and trauma, fall precautions are in place. Patient bed bound. Patient has no complaints of pain. Patient has no questions at this time. Wheels are locked and the bed is in lowest position. The call bell is within reach. Telemetry is on. Will continue to monitor.

## 2023-08-10 NOTE — ASSESSMENT & PLAN NOTE
Patient with CKD4 (Cr Bl 2.2) previously on dialysis, has not undergone HD in several months. She was sent to the ED by her PCP due to bicarb < 5. On admission, bicarb was 7, Cr was 4.7. Patient was started on bicarb drip with increase in bicarb to 13. VBG with pH 7.2. Patient received 80 of lasix with 1650cc urine output. Urine Na=32, UPCR=5. Etiology ZAK vs progression of CKD. Patient s/p HDx2 with improvement in RFP.    Acid/base: Metabolic acidosis, improved, likely secondary to renal failure and inability to filter H+  ZAK: ZAK of unknown etiology vs CKD progression  MBD: PTH = 196, Corrected Ca = 9.9, start Vit D supplementation  Anemia: Hgb of 7.3 today, anemia workup today  Volume status: Difficult to assess but patient is breathing well on RA and LE edema is improving    Recommendations:   - Dialysis session 3 today  - Agree with lasix 40mg  - Can increase fluid restriction to 1.5L  - Anemia workup today  - , vitamin D 15, agree with starting supplementation  - May only need HD sessions biweekly at discharge  - Continue sevelemer 800 TID AC  - Replete to keep K ~ 4   - Renally dose meds  - Keep blood pressure as stable as possible, will monitor  - Avoid contrast with imaging   - Strict I/Os

## 2023-08-10 NOTE — SUBJECTIVE & OBJECTIVE
Interval History: Patient underwent second HD yesterday without complication. RFP continuously improving.  Plan for another HD session today without net fluid removal. Vitamin D level at 15. Will start supplementation today. 3L urine output yesterday on 40mg lasix PO. Patient with no other questions or concerns today.    Review of patient's allergies indicates:  No Known Allergies  Current Facility-Administered Medications   Medication Frequency    amLODIPine tablet 5 mg Daily    anastrozole tablet 1 mg Daily    apixaban tablet 5 mg BID    atorvastatin tablet 80 mg Daily    carvediloL tablet 6.25 mg BID    dextrose 10% bolus 125 mL 125 mL PRN    dextrose 10% bolus 250 mL 250 mL PRN    ergocalciferol capsule 50,000 Units Q7 Days    furosemide tablet 40 mg Daily    glucagon (human recombinant) injection 1 mg PRN    glucose chewable tablet 16 g PRN    glucose chewable tablet 24 g PRN    heparin (porcine) injection 1,000 Units PRN    hydrALAZINE tablet 50 mg Q8H PRN    HYDROcodone-acetaminophen  mg per tablet 1 tablet Q6H PRN    insulin aspart U-100 pen 0-5 Units QID (AC + HS) PRN    insulin detemir U-100 (Levemir) pen 8 Units QHS    levothyroxine tablet 50 mcg Before breakfast    magnesium oxide tablet 400 mg Daily    methocarbamoL tablet 1,000 mg TID    mupirocin 2 % ointment BID    ondansetron disintegrating tablet 8 mg Q12H PRN    oxybutynin 24 hr tablet 10 mg Daily    sevelamer carbonate tablet 800 mg TID WM    traZODone tablet 100 mg QHS       Objective:     Vital Signs (Most Recent):  Temp: 98.4 °F (36.9 °C) (08/10/23 0734)  Pulse: 80 (08/10/23 0734)  Resp: 18 (08/10/23 0734)  BP: (!) 187/86 (08/10/23 0734)  SpO2: 98 % (08/10/23 0734) Vital Signs (24h Range):  Temp:  [97.4 °F (36.3 °C)-98.8 °F (37.1 °C)] 98.4 °F (36.9 °C)  Pulse:  [76-97] 80  Resp:  [17-20] 18  SpO2:  [95 %-98 %] 98 %  BP: (152-194)/(58-86) 187/86     Weight: 113.9 kg (251 lb) (08/07/23 0924)  Body mass index is 38.16 kg/m².  Body surface  area is 2.34 meters squared.    I/O last 3 completed shifts:  In: 962 [P.O.:762; Other:200]  Out: 4259 [Urine:3200; Other:1059]     Physical Exam  Constitutional:       General: She is not in acute distress.     Appearance: Normal appearance.   Cardiovascular:      Rate and Rhythm: Normal rate.      Heart sounds: Murmur (holosystolic murmur) heard.      Comments: HD line in place  Pulmonary:      Effort: Pulmonary effort is normal. No respiratory distress.      Breath sounds: Normal breath sounds.   Abdominal:      General: Bowel sounds are normal.      Tenderness: There is no abdominal tenderness.      Comments: Suprapubic catheter in place   Musculoskeletal:      Right lower leg: Edema present.      Left lower leg: Edema present.      Comments: Pitting edema bilaterally, improved from previous   Skin:     General: Skin is warm and dry.   Neurological:      General: No focal deficit present.      Mental Status: She is alert.   Psychiatric:         Mood and Affect: Mood normal.          Significant Labs:  CBC:   Recent Labs   Lab 08/10/23  0523   WBC 9.74   RBC 2.76*   HGB 7.3*   HCT 23.6*      MCV 86   MCH 26.4*   MCHC 30.9*     CMP:   Recent Labs   Lab 08/10/23  0523   *   CALCIUM 7.1*   ALBUMIN 2.0*   PROT 4.5*      K 3.9   CO2 19*      BUN 38*   CREATININE 2.6*   ALKPHOS 67   ALT <5*   AST 10   BILITOT 0.2     All labs within the past 24 hours have been reviewed.     Significant Imaging:  Labs: Reviewed

## 2023-08-10 NOTE — NURSING
Dialysis tx started to the right chest perm cath per orders placed by MD JUAN Muñoz:    Order Questions    Question Answer   Antibiotics on HD? No   Duration of Treatment 3 hours   Dialyzer F160   Dialysate Temperature (C) 37    mL/min    mL/min   K+ Potassium per Protocol   Ca++ Calcium per Protocol   Na+ Sodium per Protocol   Bicarb Bicarbonate per Protocol   Access CVC   Location Subclavian   Laterality Right   Fluid Removal (L) 0.5L   Fluid Removal Instructions maintain SBP > 90 mmHG   Dialysate Bath Solution Protocol (DO NOT MODIFY ANSWER) [email or web address] Dialysate Bath Solution Algorithm (formatted with date).pdf       Contact precautions maintained

## 2023-08-10 NOTE — PLAN OF CARE
Problem: Hemodynamic Instability (Hemodialysis)  Goal: Effective Tissue Perfusion  Outcome: Ongoing, Progressing     Dialysis tx ended to the right chest perm cath, heparin locked, capped, sterile dressing changed.    Net fluid removed: 0 ml . Pt started c/o headache during tx, UF turned off, prn pain med given, pt con. to c/o h/a, med team 4 notified, order placed.    Prn hydrazine given for elevated b/p    Report given to nurse Jono, pt awaiting transport by bed from [facility] to room 316.

## 2023-08-10 NOTE — PROGRESS NOTES
"[facility]  Nephrology  Progress Note    Patient Name: Cecile Bowen  MRN: [id number]  Admission Date: 8/6/2023  Hospital Length of Stay: 4 days  Attending Provider: Lasr Gomez MD   Primary Care Physician: Lurdes Navarro MD  Principal Problem:Acute kidney injury superimposed on CKD    Subjective:     HPI: 73 year old woman with CKD4 (BLCr 2.2), T2DM, Afib, neurogenic bladder with suprapubic catheter, hypothyroidism, HLD presented to the ED after critical labs were identified by her PCP (Bicarb < 5). She was told to present to the ED on Friday but did not present until Sunday because she "felt ok". Her bicarb was 7 on admission, Cr was 4.6. Nephrology was consulted for ZAK on CKD4. Of note, patient was on dialysis for about 6 months about a year ago, but was told "her kidneys woke up" so she was able to come off of it. She was initially against restarting dialysis unless absolutely necessary but today she spoke with her sister and is open to starting HD.      Interval History: Patient underwent second HD yesterday without complication. RFP continuously improving.  Plan for another HD session today without net fluid removal. Vitamin D level at 15. Will start supplementation today. 3L urine output yesterday on 40mg lasix PO. Patient with no other questions or concerns today.    Review of patient's allergies indicates:  No Known Allergies  Current Facility-Administered Medications   Medication Frequency    amLODIPine tablet 5 mg Daily    anastrozole tablet 1 mg Daily    apixaban tablet 5 mg BID    atorvastatin tablet 80 mg Daily    carvediloL tablet 6.25 mg BID    dextrose 10% bolus 125 mL 125 mL PRN    dextrose 10% bolus 250 mL 250 mL PRN    ergocalciferol capsule 50,000 Units Q7 Days    furosemide tablet 40 mg Daily    glucagon (human recombinant) injection 1 mg PRN    glucose chewable tablet 16 g PRN    glucose chewable tablet 24 g PRN    heparin (porcine) " injection 1,000 Units PRN    hydrALAZINE tablet 50 mg Q8H PRN    HYDROcodone-acetaminophen  mg per tablet 1 tablet Q6H PRN    insulin aspart U-100 pen 0-5 Units QID (AC + HS) PRN    insulin detemir U-100 (Levemir) pen 8 Units QHS    levothyroxine tablet 50 mcg Before breakfast    magnesium oxide tablet 400 mg Daily    methocarbamoL tablet 1,000 mg TID    mupirocin 2 % ointment BID    ondansetron disintegrating tablet 8 mg Q12H PRN    oxybutynin 24 hr tablet 10 mg Daily    sevelamer carbonate tablet 800 mg TID WM    traZODone tablet 100 mg QHS       Objective:     Vital Signs (Most Recent):  Temp: 98.4 °F (36.9 °C) (08/10/23 0734)  Pulse: 80 (08/10/23 0734)  Resp: 18 (08/10/23 0734)  BP: (!) 187/86 (08/10/23 0734)  SpO2: 98 % (08/10/23 0734) Vital Signs (24h Range):  Temp:  [97.4 °F (36.3 °C)-98.8 °F (37.1 °C)] 98.4 °F (36.9 °C)  Pulse:  [76-97] 80  Resp:  [17-20] 18  SpO2:  [95 %-98 %] 98 %  BP: (152-194)/(58-86) 187/86     Weight: 113.9 kg (251 lb) (08/07/23 0937)  Body mass index is 38.16 kg/m².  Body surface area is 2.34 meters squared.    I/O last 3 completed shifts:  In: 962 [P.O.:762; Other:200]  Out: 4259 [Urine:3200; Other:1059]     Physical Exam  Constitutional:       General: She is not in acute distress.     Appearance: Normal appearance.   Cardiovascular:      Rate and Rhythm: Normal rate.      Heart sounds: Murmur (holosystolic murmur) heard.      Comments: HD line in place  Pulmonary:      Effort: Pulmonary effort is normal. No respiratory distress.      Breath sounds: Normal breath sounds.   Abdominal:      General: Bowel sounds are normal.      Tenderness: There is no abdominal tenderness.      Comments: Suprapubic catheter in place   Musculoskeletal:      Right lower leg: Edema present.      Left lower leg: Edema present.      Comments: Pitting edema bilaterally, improved from previous   Skin:     General: Skin is warm and dry.   Neurological:      General: No focal deficit  present.      Mental Status: She is alert.   Psychiatric:         Mood and Affect: Mood normal.          Significant Labs:  CBC:   Recent Labs   Lab 08/10/23  0523   WBC 9.74   RBC 2.76*   HGB 7.3*   HCT 23.6*      MCV 86   MCH 26.4*   MCHC 30.9*     CMP:   Recent Labs   Lab 08/10/23  0523   *   CALCIUM 7.1*   ALBUMIN 2.0*   PROT 4.5*      K 3.9   CO2 19*      BUN 38*   CREATININE 2.6*   ALKPHOS 67   ALT <5*   AST 10   BILITOT 0.2     All labs within the past 24 hours have been reviewed.     Significant Imaging:  Labs: Reviewed    Assessment/Plan:     Renal/  * Acute kidney injury superimposed on CKD  Patient with CKD4 (Cr Bl 2.2) previously on dialysis, has not undergone HD in several months. She was sent to the ED by her PCP due to bicarb < 5. On admission, bicarb was 7, Cr was 4.7. Patient was started on bicarb drip with increase in bicarb to 13. VBG with pH 7.2. Patient received 80 of lasix with 1650cc urine output. Urine Na=32, UPCR=5. Etiology ZAK vs progression of CKD. Patient s/p HDx2 with improvement in RFP.    Acid/base: Metabolic acidosis, improved, likely secondary to renal failure and inability to filter H+  ZAK: ZAK of unknown etiology vs CKD progression  MBD: PTH = 196, Corrected Ca = 9.9, start Vit D supplementation  Anemia: Hgb of 7.3 today, anemia workup today  Volume status: Difficult to assess but patient is breathing well on RA and LE edema is improving    Recommendations:   - Dialysis session 3 today  - Agree with lasix 40mg  - Can increase fluid restriction to 1.5L  - Anemia workup today  - , vitamin D 15, agree with starting supplementation  - May only need HD sessions biweekly at discharge  - Continue sevelemer 800 TID AC  - Replete to keep K ~ 4   - Renally dose meds  - Keep blood pressure as stable as possible, will monitor  - Avoid contrast with imaging   - Strict I/Os      Thank you for your consult. I will follow-up with patient. Please contact us if  you have any additional questions.    Ines Romano MD  Nephrology  [facility]

## 2023-08-11 VITALS
DIASTOLIC BLOOD PRESSURE: 75 MMHG | BODY MASS INDEX: 38.04 KG/M2 | HEART RATE: 99 BPM | HEIGHT: 68 IN | WEIGHT: 251 LBS | OXYGEN SATURATION: 94 % | TEMPERATURE: 99 F | SYSTOLIC BLOOD PRESSURE: 166 MMHG | RESPIRATION RATE: 18 BRPM

## 2023-08-11 LAB
ALBUMIN SERPL BCP-MCNC: 2.1 G/DL (ref 3.5–5.2)
ALP SERPL-CCNC: 69 U/L (ref 55–135)
ALT SERPL W/O P-5'-P-CCNC: <5 U/L (ref 10–44)
ANION GAP SERPL CALC-SCNC: 11 MMOL/L (ref 8–16)
AST SERPL-CCNC: 11 U/L (ref 10–40)
BACTERIA BLD CULT: NORMAL
BACTERIA BLD CULT: NORMAL
BASOPHILS # BLD AUTO: 0.08 K/UL (ref 0–0.2)
BASOPHILS NFR BLD: 0.7 % (ref 0–1.9)
BILIRUB SERPL-MCNC: 0.2 MG/DL (ref 0.1–1)
BUN SERPL-MCNC: 24 MG/DL (ref 8–23)
CALCIUM SERPL-MCNC: 7.5 MG/DL (ref 8.7–10.5)
CHLORIDE SERPL-SCNC: 103 MMOL/L (ref 95–110)
CO2 SERPL-SCNC: 23 MMOL/L (ref 23–29)
CREAT SERPL-MCNC: 2 MG/DL (ref 0.5–1.4)
DIFFERENTIAL METHOD: ABNORMAL
EOSINOPHIL # BLD AUTO: 0.4 K/UL (ref 0–0.5)
EOSINOPHIL NFR BLD: 3.1 % (ref 0–8)
ERYTHROCYTE [DISTWIDTH] IN BLOOD BY AUTOMATED COUNT: 15.8 % (ref 11.5–14.5)
EST. GFR  (NO RACE VARIABLE): 26.2 ML/MIN/1.73 M^2
GLUCOSE SERPL-MCNC: 155 MG/DL (ref 70–110)
HCT VFR BLD AUTO: 24.8 % (ref 37–48.5)
HGB BLD-MCNC: 7.8 G/DL (ref 12–16)
IMM GRANULOCYTES # BLD AUTO: 0.2 K/UL (ref 0–0.04)
IMM GRANULOCYTES NFR BLD AUTO: 1.7 % (ref 0–0.5)
LYMPHOCYTES # BLD AUTO: 2.9 K/UL (ref 1–4.8)
LYMPHOCYTES NFR BLD: 25.3 % (ref 18–48)
MAGNESIUM SERPL-MCNC: 1.7 MG/DL (ref 1.6–2.6)
MCH RBC QN AUTO: 27.1 PG (ref 27–31)
MCHC RBC AUTO-ENTMCNC: 31.5 G/DL (ref 32–36)
MCV RBC AUTO: 86 FL (ref 82–98)
MONOCYTES # BLD AUTO: 1.1 K/UL (ref 0.3–1)
MONOCYTES NFR BLD: 9.9 % (ref 4–15)
NEUTROPHILS # BLD AUTO: 6.8 K/UL (ref 1.8–7.7)
NEUTROPHILS NFR BLD: 59.3 % (ref 38–73)
NRBC BLD-RTO: 0 /100 WBC
PHOSPHATE SERPL-MCNC: 2.4 MG/DL (ref 2.7–4.5)
PLATELET # BLD AUTO: 318 K/UL (ref 150–450)
PMV BLD AUTO: 9.7 FL (ref 9.2–12.9)
POCT GLUCOSE: 162 MG/DL (ref 70–110)
POCT GLUCOSE: 186 MG/DL (ref 70–110)
POCT GLUCOSE: 251 MG/DL (ref 70–110)
POTASSIUM SERPL-SCNC: 3.9 MMOL/L (ref 3.5–5.1)
PROT SERPL-MCNC: 4.8 G/DL (ref 6–8.4)
RBC # BLD AUTO: 2.88 M/UL (ref 4–5.4)
SODIUM SERPL-SCNC: 137 MMOL/L (ref 136–145)
WBC # BLD AUTO: 11.44 K/UL (ref 3.9–12.7)

## 2023-08-11 PROCEDURE — 84100 ASSAY OF PHOSPHORUS: CPT | Mod: HCNC

## 2023-08-11 PROCEDURE — 25000003 PHARM REV CODE 250: Mod: HCNC

## 2023-08-11 PROCEDURE — 25000003 PHARM REV CODE 250: Mod: HCNC | Performed by: HOSPITALIST

## 2023-08-11 PROCEDURE — 99239 PR HOSPITAL DISCHARGE DAY,>30 MIN: ICD-10-PCS | Mod: HCNC,GC,, | Performed by: STUDENT IN AN ORGANIZED HEALTH CARE EDUCATION/TRAINING PROGRAM

## 2023-08-11 PROCEDURE — 1111F PR DISCHARGE MEDS RECONCILED W/ CURRENT OUTPATIENT MED LIST: ICD-10-PCS | Mod: HCNC,CPTII,GC, | Performed by: STUDENT IN AN ORGANIZED HEALTH CARE EDUCATION/TRAINING PROGRAM

## 2023-08-11 PROCEDURE — 85025 COMPLETE CBC W/AUTO DIFF WBC: CPT | Mod: HCNC

## 2023-08-11 PROCEDURE — 1111F DSCHRG MED/CURRENT MED MERGE: CPT | Mod: HCNC,CPTII,GC, | Performed by: STUDENT IN AN ORGANIZED HEALTH CARE EDUCATION/TRAINING PROGRAM

## 2023-08-11 PROCEDURE — 94660 CPAP INITIATION&MGMT: CPT | Mod: HCNC

## 2023-08-11 PROCEDURE — 99900035 HC TECH TIME PER 15 MIN (STAT): Mod: HCNC

## 2023-08-11 PROCEDURE — 83735 ASSAY OF MAGNESIUM: CPT | Mod: HCNC

## 2023-08-11 PROCEDURE — 99239 HOSP IP/OBS DSCHRG MGMT >30: CPT | Mod: HCNC,GC,, | Performed by: STUDENT IN AN ORGANIZED HEALTH CARE EDUCATION/TRAINING PROGRAM

## 2023-08-11 PROCEDURE — 94761 N-INVAS EAR/PLS OXIMETRY MLT: CPT | Mod: HCNC

## 2023-08-11 PROCEDURE — 99232 SBSQ HOSP IP/OBS MODERATE 35: CPT | Mod: HCNC,,, | Performed by: NURSE PRACTITIONER

## 2023-08-11 PROCEDURE — 36415 COLL VENOUS BLD VENIPUNCTURE: CPT | Mod: HCNC

## 2023-08-11 PROCEDURE — 99232 PR SUBSEQUENT HOSPITAL CARE,LEVL II: ICD-10-PCS | Mod: HCNC,,, | Performed by: NURSE PRACTITIONER

## 2023-08-11 PROCEDURE — 80053 COMPREHEN METABOLIC PANEL: CPT | Mod: HCNC

## 2023-08-11 RX ORDER — LANOLIN ALCOHOL/MO/W.PET/CERES
400 CREAM (GRAM) TOPICAL DAILY
Qty: 30 TABLET | Refills: 2 | Status: SHIPPED | OUTPATIENT
Start: 2023-08-12

## 2023-08-11 RX ORDER — FUROSEMIDE 40 MG/1
40 TABLET ORAL DAILY
Qty: 30 TABLET | Refills: 11 | Status: SHIPPED | OUTPATIENT
Start: 2023-08-12 | End: 2024-08-11

## 2023-08-11 RX ORDER — NIFEDIPINE 30 MG/1
30 TABLET, EXTENDED RELEASE ORAL DAILY
Qty: 30 TABLET | Refills: 11 | Status: SHIPPED | OUTPATIENT
Start: 2023-08-12 | End: 2023-08-11 | Stop reason: HOSPADM

## 2023-08-11 RX ORDER — NIFEDIPINE 90 MG/1
90 TABLET, EXTENDED RELEASE ORAL DAILY
Qty: 30 TABLET | Refills: 11 | Status: SHIPPED | OUTPATIENT
Start: 2023-08-12 | End: 2024-03-18

## 2023-08-11 RX ORDER — SUMATRIPTAN 50 MG/1
100 TABLET, FILM COATED ORAL 2 TIMES DAILY PRN
Status: DISCONTINUED | OUTPATIENT
Start: 2023-08-11 | End: 2023-08-12 | Stop reason: HOSPADM

## 2023-08-11 RX ORDER — SUMATRIPTAN SUCCINATE 100 MG/1
100 TABLET ORAL 2 TIMES DAILY PRN
Qty: 30 TABLET | Refills: 1 | Status: SHIPPED | OUTPATIENT
Start: 2023-08-11 | End: 2024-02-27 | Stop reason: SDUPTHER

## 2023-08-11 RX ORDER — ISOSORBIDE MONONITRATE 30 MG/1
30 TABLET, EXTENDED RELEASE ORAL DAILY
Status: DISCONTINUED | OUTPATIENT
Start: 2023-08-11 | End: 2023-08-12 | Stop reason: HOSPADM

## 2023-08-11 RX ORDER — SODIUM CHLORIDE 9 MG/ML
INJECTION, SOLUTION INTRAVENOUS ONCE
Status: DISCONTINUED | OUTPATIENT
Start: 2023-08-12 | End: 2023-08-12 | Stop reason: HOSPADM

## 2023-08-11 RX ORDER — ISOSORBIDE MONONITRATE 30 MG/1
30 TABLET, EXTENDED RELEASE ORAL DAILY
Qty: 30 TABLET | Refills: 11 | Status: SHIPPED | OUTPATIENT
Start: 2023-08-11 | End: 2024-02-21

## 2023-08-11 RX ORDER — CARVEDILOL 6.25 MG/1
6.25 TABLET ORAL 2 TIMES DAILY
Qty: 60 TABLET | Refills: 11 | Status: SHIPPED | OUTPATIENT
Start: 2023-08-11 | End: 2024-08-10

## 2023-08-11 RX ADMIN — APIXABAN 5 MG: 5 TABLET, FILM COATED ORAL at 09:08

## 2023-08-11 RX ADMIN — SEVELAMER CARBONATE 800 MG: 800 TABLET, FILM COATED ORAL at 05:08

## 2023-08-11 RX ADMIN — METHOCARBAMOL 1000 MG: 750 TABLET, FILM COATED ORAL at 09:08

## 2023-08-11 RX ADMIN — LEVOTHYROXINE SODIUM 50 MCG: 50 TABLET ORAL at 05:08

## 2023-08-11 RX ADMIN — ATORVASTATIN CALCIUM 80 MG: 20 TABLET, FILM COATED ORAL at 08:08

## 2023-08-11 RX ADMIN — NIFEDIPINE 30 MG: 30 TABLET, FILM COATED, EXTENDED RELEASE ORAL at 09:08

## 2023-08-11 RX ADMIN — ANASTROZOLE 1 MG: 1 TABLET, COATED ORAL at 09:08

## 2023-08-11 RX ADMIN — MUPIROCIN: 20 OINTMENT TOPICAL at 09:08

## 2023-08-11 RX ADMIN — HYDROCODONE BITARTRATE AND ACETAMINOPHEN 1 TABLET: 10; 325 TABLET ORAL at 02:08

## 2023-08-11 RX ADMIN — METHOCARBAMOL 1000 MG: 750 TABLET, FILM COATED ORAL at 02:08

## 2023-08-11 RX ADMIN — INSULIN ASPART 3 UNITS: 100 INJECTION, SOLUTION INTRAVENOUS; SUBCUTANEOUS at 12:08

## 2023-08-11 RX ADMIN — SUMATRIPTAN SUCCINATE 100 MG: 50 TABLET ORAL at 11:08

## 2023-08-11 RX ADMIN — CARVEDILOL 6.25 MG: 6.25 TABLET, FILM COATED ORAL at 09:08

## 2023-08-11 RX ADMIN — OXYBUTYNIN CHLORIDE 10 MG: 5 TABLET, EXTENDED RELEASE ORAL at 08:08

## 2023-08-11 RX ADMIN — HYDROCODONE BITARTRATE AND ACETAMINOPHEN 1 TABLET: 10; 325 TABLET ORAL at 07:08

## 2023-08-11 RX ADMIN — Medication 400 MG: at 09:08

## 2023-08-11 RX ADMIN — ISOSORBIDE MONONITRATE 30 MG: 30 TABLET, EXTENDED RELEASE ORAL at 02:08

## 2023-08-11 RX ADMIN — SEVELAMER CARBONATE 800 MG: 800 TABLET, FILM COATED ORAL at 08:08

## 2023-08-11 RX ADMIN — SEVELAMER CARBONATE 800 MG: 800 TABLET, FILM COATED ORAL at 12:08

## 2023-08-11 RX ADMIN — FUROSEMIDE 40 MG: 40 TABLET ORAL at 09:08

## 2023-08-11 RX ADMIN — HYDRALAZINE HYDROCHLORIDE 50 MG: 50 TABLET ORAL at 01:08

## 2023-08-11 NOTE — SUBJECTIVE & OBJECTIVE
Interval History: 3rd HD treatment yesterday. 0 UF. Pt c/o of headache at the end of treatment. 24 UOP 1.4L. Continues on lasix 40 PO daily. SBP 130s-150/60s-70s. Pending outpatient HD chair.     Review of patient's allergies indicates:  No Known Allergies  Current Facility-Administered Medications   Medication Frequency    [START ON 8/12/2023] 0.9%  NaCl infusion Once    acetaminophen tablet 650 mg Q6H PRN    anastrozole tablet 1 mg Daily    apixaban tablet 5 mg BID    atorvastatin tablet 80 mg Daily    carvediloL tablet 6.25 mg BID    dextrose 10% bolus 125 mL 125 mL PRN    dextrose 10% bolus 250 mL 250 mL PRN    ergocalciferol capsule 50,000 Units Q7 Days    furosemide tablet 40 mg Daily    glucagon (human recombinant) injection 1 mg PRN    glucose chewable tablet 16 g PRN    glucose chewable tablet 24 g PRN    heparin (porcine) injection 1,000 Units PRN    hydrALAZINE tablet 50 mg Q8H PRN    HYDROcodone-acetaminophen  mg per tablet 1 tablet Q6H PRN    insulin aspart U-100 pen 0-5 Units QID (AC + HS) PRN    insulin detemir U-100 (Levemir) pen 8 Units QHS    levothyroxine tablet 50 mcg Before breakfast    magnesium oxide tablet 400 mg Daily    methocarbamoL tablet 1,000 mg TID    mupirocin 2 % ointment BID    NIFEdipine 24 hr tablet 30 mg Daily    ondansetron disintegrating tablet 8 mg Q12H PRN    oxybutynin 24 hr tablet 10 mg Daily    sevelamer carbonate tablet 800 mg TID WM    sumatriptan tablet 100 mg BID PRN    traZODone tablet 100 mg QHS       Objective:     Vital Signs (Most Recent):  Temp: 98.8 °F (37.1 °C) (08/11/23 0746)  Pulse: 86 (08/11/23 1028)  Resp: 16 (08/11/23 0746)  BP: 138/67 (08/11/23 1028)  SpO2: (!) 94 % (08/11/23 0746) Vital Signs (24h Range):  Temp:  [97.6 °F (36.4 °C)-98.8 °F (37.1 °C)] 98.8 °F (37.1 °C)  Pulse:  [] 86  Resp:  [15-18] 16  SpO2:  [94 %-99 %] 94 %  BP: (120-216)/() 138/67     Weight: 113.9 kg (251 lb) (08/07/23 0937)  Body mass index is 38.16 kg/m².  Body  surface area is 2.34 meters squared.    I/O last 3 completed shifts:  In: 2582 [P.O.:1782; I.V.:300; Other:500]  Out: 3121 [Urine:2450; Other:671]     Physical Exam  Vitals and nursing note reviewed.   Constitutional:       General: She is not in acute distress.     Appearance: Normal appearance.   Cardiovascular:      Rate and Rhythm: Normal rate.      Heart sounds: Murmur (holosystolic murmur) heard.      Comments: HD line in place  Pulmonary:      Effort: Pulmonary effort is normal. No respiratory distress.      Breath sounds: Normal breath sounds.   Abdominal:      General: Bowel sounds are normal.      Tenderness: There is no abdominal tenderness.      Comments: Suprapubic catheter in place   Musculoskeletal:      Right lower leg: Edema present.      Left lower leg: Edema present.      Comments: Pitting edema bilaterally, sig improvement from previous   Skin:     General: Skin is warm and dry.   Neurological:      General: No focal deficit present.      Mental Status: She is alert.   Psychiatric:         Mood and Affect: Mood normal.          Significant Labs:  CBC:   Recent Labs   Lab 08/11/23  0558   WBC 11.44   RBC 2.88*   HGB 7.8*   HCT 24.8*      MCV 86   MCH 27.1   MCHC 31.5*     CMP:   Recent Labs   Lab 08/11/23  0558   *   CALCIUM 7.5*   ALBUMIN 2.1*   PROT 4.8*      K 3.9   CO2 23      BUN 24*   CREATININE 2.0*   ALKPHOS 69   ALT <5*   AST 11   BILITOT 0.2     All labs within the past 24 hours have been reviewed.

## 2023-08-11 NOTE — PLAN OF CARE
Ambulance stretcher requested with [facility] (ext [id number]) for 6 pm pickup time for transport to patient's home.

## 2023-08-11 NOTE — PLAN OF CARE
[facility] - Cardiology Stepdown      HOME HEALTH ORDERS  FACE TO FACE ENCOUNTER    Patient Name: Cecile Bowen  YOB: 1950    PCP: Lurdes Navarro MD   PCP Address: [address] [facility] / [place] [address]  PCP Phone Number: [phone]  PCP Fax: [phone]    Encounter Date: 8/6/23    Admit to Home Health    Diagnoses:  Active Hospital Problems    Diagnosis  POA    *Acute kidney injury superimposed on CKD [N17.9, N18.9]  Yes     Priority: 1 - High    Irritant contact dermatitis due to fecal, urinary or dual incontinence [L24.A2]  Yes    Hyperphosphatemia [E83.39]  Yes    Bacteria in urine [R82.71]  Yes    Uncomplicated opioid dependence [F11.20]  Yes    Hypertension associated with diabetes [E11.59, I15.2]  Yes     Chronic    A-fib [I48.91]  Yes     Chronic    Type 2 diabetes mellitus with stage 4 chronic kidney disease, with long-term current use of insulin [E11.22, N18.4, Z79.4]  Not Applicable     Chronic    Cervicogenic migraine [G43.809]  Yes     Chronic    Morbid obesity due to excess calories [E66.01]  Yes     Chronic    Suprapubic catheter [Z93.59]  Not Applicable     Chronic    Neurogenic bladder [N31.9]  Yes     Chronic    Normal anion gap metabolic acidosis [E87.20]  Yes    VIJAYA (obstructive sleep apnea) [G47.33]  Yes     Chronic    Hyperlipidemia associated with type 2 diabetes mellitus [E11.69, E78.5]  Yes     Chronic    Hypothyroidism [E03.9]  Yes     Chronic      Resolved Hospital Problems   No resolved problems to display.       Follow Up Appointments:  Future Appointments   Date Time Provider Department Center   8/15/2023  1:00 PM Tesha Stafford MD [facility]ILIA [facility]   8/18/2023  8:00 AM Rylee Cheng NP [address]   8/25/2023  9:10 AM LAB, APPOINTMENT [facility] HERBERT [facility] IM [facility]   8/28/2023  2:00 PM Brooke Gabriel, APRN, FNP [facility]   9/7/2023  2:20 PM PRIV PRE-ADMIT, ENDO -[facility]   10/11/2023  8:45 AM LALITO Maria II, MD [facility]   10/26/2023 11:15 AM Regan Brock DPM [facility] [address]   1/11/2024  2:00 PM Gavin Zuluaga DO [facility]       Allergies:Review of patient's allergies indicates:  No Known Allergies    Medications: Review discharge medications with patient and family and provide education.    Current Facility-Administered Medications   Medication Dose Route Frequency Provider Last Rate Last Admin    [START ON 8/12/2023] 0.9%  NaCl infusion   Intravenous Once Lamar Negro, MUNA        acetaminophen tablet 650 mg  650 mg Oral Q6H PRN Marge Jennings MD        anastrozole tablet 1 mg  1 mg Oral Daily Marge Jennings MD   1 mg at 08/11/23 0900    apixaban tablet 5 mg  5 mg Oral BID Junior Tobin MD   5 mg at 08/11/23 0900    atorvastatin tablet 80 mg  80 mg Oral Daily Marge Jennings MD   80 mg at 08/11/23 0859    carvediloL tablet 6.25 mg  6.25 mg Oral BID Junior Tobin MD   6.25 mg at 08/11/23 0900    dextrose 10% bolus 125 mL 125 mL  12.5 g Intravenous PRN Meagan Templeton MD        dextrose 10% bolus 250 mL 250 mL  25 g Intravenous PRN Meagan Templeton MD        ergocalciferol capsule 50,000 Units  50,000 Units Oral Q7 Days Marge Jennings MD   50,000 Units at 08/08/23 1100    furosemide tablet 40 mg  40 mg Oral Daily Meagan Templeton MD   40 mg at 08/11/23 0900    glucagon (human recombinant) injection 1 mg  1 mg Intramuscular PRN Marge Jennings MD        glucose chewable tablet 16 g  16 g Oral PRN Marge Jennings MD        glucose chewable tablet 24 g  24 g Oral PRN Marge Jennings MD        heparin (porcine) injection 1,000 Units  1,000 Units Intra-Catheter PRN Silvio Muñoz DO   1,000 Units at 08/10/23 1634    hydrALAZINE tablet 50 mg  50 mg Oral Q8H PRN Meagan Templeton MD   50 mg at 08/11/23  1300    HYDROcodone-acetaminophen  mg per tablet 1 tablet  1 tablet Oral Q6H PRN Marge Jennings MD   1 tablet at 08/11/23 1413    insulin aspart U-100 pen 0-5 Units  0-5 Units Subcutaneous QID (AC + HS) PRN Marge Jennings MD   3 Units at 08/11/23 1253    insulin detemir U-100 (Levemir) pen 8 Units  8 Units Subcutaneous QHS Marge Jennings MD   8 Units at 08/10/23 2120    isosorbide mononitrate 24 hr tablet 30 mg  30 mg Oral Daily Marge Jennings MD   30 mg at 08/11/23 1411    levothyroxine tablet 50 mcg  50 mcg Oral Before breakfast Marge Jennings MD   50 mcg at 08/11/23 0552    magnesium oxide tablet 400 mg  400 mg Oral Daily Meagan Templeton MD   400 mg at 08/11/23 0900    methocarbamoL tablet 1,000 mg  1,000 mg Oral TID Junior Tobin MD   1,000 mg at 08/11/23 1411    mupirocin 2 % ointment   Nasal BID Silvio Muñoz DO   Given at 08/11/23 0907    [START ON 8/12/2023] NIFEdipine 24 hr tablet 90 mg  90 mg Oral Daily Marge Jennings MD        ondansetron disintegrating tablet 8 mg  8 mg Oral Q12H PRN Marge Jennings MD   8 mg at 08/10/23 0217    oxybutynin 24 hr tablet 10 mg  10 mg Oral Daily Marge Jennings MD   10 mg at 08/11/23 0859    sevelamer carbonate tablet 800 mg  800 mg Oral TID  Meagan Templeton MD   800 mg at 08/11/23 1253    sumatriptan tablet 100 mg  100 mg Oral BID PRN Junior Tobin MD   100 mg at 08/11/23 1129    traZODone tablet 100 mg  100 mg Oral QHS Marge Jennings MD   100 mg at 08/10/23 2119     Current Discharge Medication List        START taking these medications    Details   calcium acetate,phosphat bind, (PHOSLO) 667 mg tablet Take 1 tablet (667 mg total) by mouth 3 (three) times daily with meals.  Qty: 90 tablet, Refills: 11      carvediloL (COREG) 6.25 MG tablet Take 1 tablet (6.25 mg total) by mouth 2 (two) times daily.  Qty: 60 tablet, Refills: 11     Comments: .      furosemide (LASIX) 40 MG tablet Take 1 tablet (40 mg total) by mouth once daily.  Qty: 30 tablet, Refills: 11      isosorbide mononitrate (IMDUR) 30 MG 24 hr tablet Take 1 tablet (30 mg total) by mouth once daily.  Qty: 30 tablet, Refills: 11      magnesium oxide (MAG-OX) 400 mg (241.3 mg magnesium) tablet Take 1 tablet (400 mg total) by mouth once daily.  Qty: 30 tablet, Refills: 2      NIFEdipine (PROCARDIA-XL) 90 MG (OSM) 24 hr tablet Take 1 tablet (90 mg total) by mouth once daily.  Qty: 30 tablet, Refills: 11    Comments: .           CONTINUE these medications which have CHANGED    Details   apixaban (ELIQUIS) 5 mg Tab Take 1 tablet (5 mg total) by mouth 2 (two) times daily.  Qty: 180 tablet, Refills: 3      HYDROcodone-acetaminophen (NORCO)  mg per tablet Take 1 tablet by mouth every 6 (six) hours as needed for Pain.  Qty: 28 tablet, Refills: 0    Comments: Quantity prescribed more than 7 day supply? No      sumatriptan (IMITREX) 100 MG tablet Take 1 tablet (100 mg total) by mouth 2 (two) times daily as needed for Migraine.  Qty: 30 tablet, Refills: 1           CONTINUE these medications which have NOT CHANGED    Details   anastrozole (ARIMIDEX) 1 mg Tab Take 1 tablet (1 mg total) by mouth once daily.  Qty: 90 tablet, Refills: 2    Associated Diagnoses: Malignant neoplasm of axillary tail of left breast in female, estrogen receptor positive      atorvastatin (LIPITOR) 80 MG tablet Take 1 tablet (80 mg total) by mouth once daily.  Qty: 90 tablet, Refills: 3    Associated Diagnoses: Hyperlipidemia associated with type 2 diabetes mellitus      erenumab-aooe (AIMOVIG) 140 mg/mL autoinjector Inject 1 mL (140 mg total) into the skin every 30 days.  Qty: 1 mL, Refills: 11    Associated Diagnoses: Intractable chronic migraine without aura and with status migrainosus      ergocalciferol (ERGOCALCIFEROL) 50,000 unit Cap TAKE ONE CAPSULE BY MOUTH ONE TIME PER WEEK, TAKES ON TUESDAYS  Qty: 12  "capsule, Refills: 3      methocarbamoL (ROBAXIN) 750 MG Tab TAKE 1-2 TABLETS (750-1,500 MG TOTAL) BY MOUTH 3 (THREE) TIMES DAILY AS NEEDED (MUSCLE SPASMS).  Qty: 180 tablet, Refills: 1    Associated Diagnoses: Chronic midline low back pain without sciatica; Chronic neck pain; Fibromyalgia      ondansetron (ZOFRAN-ODT) 8 MG TbDL TAKE 1 TABLET BY MOUTH EVERY 12 HOURS AS NEEDED  Qty: 30 tablet, Refills: 2    Associated Diagnoses: Nausea and vomiting      oxybutynin (DITROPAN-XL) 10 MG 24 hr tablet Take 1 tablet (10 mg total) by mouth once daily.  Qty: 100 tablet, Refills: 3    Associated Diagnoses: Bladder spasms      sodium bicarbonate 650 MG tablet Take 1 tablet (650 mg total) by mouth 3 (three) times daily.  Qty: 90 tablet, Refills: 11    Associated Diagnoses: CKD (chronic kidney disease) stage 4, GFR 15-29 ml/min      SYNTHROID 50 mcg tablet TAKE 1 TABLET BY MOUTH BEFORE BREAKFAST. ADMINISTER ON AN EMPTY STOMACH AT LEAST 30 MINUTES BEFORE FOOD. IF RECEIVING TUBE FEEDS, HOLD TUBE FEEDS FOR 1 HOUR BEFORE AND AFTER LEVOTHYROXINE ADMINISTRATION.  Qty: 90 tablet, Refills: 2      traZODone (DESYREL) 100 MG tablet TAKE 1 TABLET BY MOUTH NIGHTLY AS NEEDED FOR INSOMNIA.  Qty: 90 tablet, Refills: 2      BD INSULIN SYRINGE ULTRA-FINE 0.5 mL 31 gauge x 5/16" Syrg USE WITH INSULIN 4 TIMES A DAY  Qty: 100 each, Refills: 12      insulin (LANTUS SOLOSTAR U-100 INSULIN) glargine 100 units/mL SubQ pen INJECT 14-15 UNITS UNDER THE SKIN ONCE DAILY  Qty: 15 each, Refills: 3    Associated Diagnoses: Diabetes mellitus type 2 in obese      pen needle, diabetic (BD ULTRA-FINE SHORT PEN NEEDLE) 31 gauge x 5/16" Ndle USE WITH LANTUS DAILY, e 11.65  Qty: 100 each, Refills: 3    Comments: DX Code Needed  PATIENT IS REQUESTING A REFILL FOR THIS RX.  Associated Diagnoses: Type 2 diabetes mellitus not at goal           STOP taking these medications       cloNIDine (CATAPRES) 0.1 MG tablet Comments:   Reason for Stopping:                 I have " seen and examined this patient within the last 30 days. My clinical findings that support the need for the home health skilled services and home bound status are the following:no   Weakness/numbness causing balance and gait disturbance due to Weakness/Debility making it taxing to leave home.  Medical restrictions requiring assistance of another human to leave home due to  Decreased range of motions in extremities and Morbid Obesity.     Diet:   renal diet    Labs:  SN to perform labs:  CBC: Biweekly; 99 month(s), CMP: Biweekly; 99 month(s), and  Report Lab results to PCP.    Referrals/ Consults  Physical Therapy to evaluate and treat. Evaluate for home safety and equipment needs; Establish/upgrade home exercise program. Perform / instruct on therapeutic exercises, gait training, transfer training, and Range of Motion.  Occupational Therapy to evaluate and treat. Evaluate home environment for safety and equipment needs. Perform/Instruct on transfers, ADL training, ROM, and therapeutic exercises.   to evaluate for community resources/long-range planning.  Aide to provide assistance with personal care, ADLs, and vital signs.    Activities:   bedrest    Nursing:   Agency to admit patient within 24 hours of hospital discharge unless specified on physician order or at patient request    SN to complete comprehensive assessment including routine vital signs. Instruct on disease process and s/s of complications to report to MD. Review/verify medication list sent home with the patient at time of discharge  and instruct patient/caregiver as needed. Frequency may be adjusted depending on start of care date.     Skilled nurse to perform up to 3 visits PRN for symptoms related to diagnosis    Notify MD if SBP > 160 or < 90; DBP > 90 or < 50; HR > 120 or < 50; Temp > 101; O2 < 88%;    Ok to schedule additional visits based on staff availability and patient request on consecutive days within the home health  episode.    When multiple disciplines ordered:    Start of Care occurs on Sunday - Wednesday schedule remaining discipline evaluations as ordered on separate consecutive days following the start of care.    Thursday SOC -schedule subsequent evaluations Friday and Monday the following week.     Friday - Saturday SOC - schedule subsequent discipline evaluations on consecutive days starting Monday of the following week.    For all post-discharge communication and subsequent orders please contact patient's primary care physician. If unable to reach primary care physician or do not receive response within 30 minutes, please contact [facility] RN for clinical staff order clarification    Miscellaneous   Tiwari Care: Instruct patient/caregiver to empty Tiwari bag.  Change Tiwari every month.  Routine Skin for Bedridden Patients: Instruct patient/caregiver to apply moisture barrier cream to all skin folds and wet areas in perineal area daily and after baths and all bowel movements.  Diabetic Care:   Fingerstick blood sugar a.m. and p.m. and Report CBG < 60 or > 350 to physician.    Home Health Aide:  Nursing Three times weekly, Physical Therapy Twice weekly, Occupational Therapy Twice weekly, and Home Health Aide Other: Home Health Aide up to 4 hours a day, up to 7 days a week, for up to 3 weeks, as needed for assistance with ADLs    Wound Care Orders  no    I certify that this patient is confined to her home and needs intermittent skilled nursing care, physical therapy, and occupational therapy.

## 2023-08-11 NOTE — ASSESSMENT & PLAN NOTE
Normal anion gap metabolic acidosis    Patient with acute kidney injury/acute renal failure likely due to nonadherence to dialysis and volume overload that is contributing the excessive load on kidneys. ZAK is currently improving. Baseline creatinine 2.2 - Labs reviewed- Renal function/electrolytes with Estimated Creatinine Clearance: 26.3 mL/min (A) (based on SCr of 2.6 mg/dL (H)). according to latest data. Monitor urine output and serial BMP and adjust therapy as needed. Avoid nephrotoxins and renally dose meds for GFR listed above.  Electrolyte derangements and volume status were addressed medically at first, given her hemodynamic and acid/base stability in the ER.    - Per echo results from 08/07/23 EF 60% with normal systolic and estimated diastolic function, normal CVP: indicating extravascular volume overload  Tunnel cath placed on 8/7.  Pt restarted on HD on 8/8, with second HD session on 8/9.    · Pt will need HD 3x/wk  · Seeking outpatient HD chair at [facility] (where she was previously dialyzed, and which has a Ray lift)  · Continuing to renally dose meds and avoid nephrotoxins  · Continuing renal diet

## 2023-08-11 NOTE — DISCHARGE SUMMARY
"[facility] - Cardiology [facility] Medicine  Discharge Summary      Patient Name: Cecile Bowen  MRN: [id number]  LISA: [id number]  Patient Class: IP- Inpatient  Admission Date: 8/6/2023  Hospital Length of Stay: 5 days  Discharge Date and Time:  08/11/2023 5:30 PM  Attending Physician: Lars Gomez MD   Discharging Provider: Marge Jennings MD  Primary Care Provider: Lurdes Navarro MD  Hospital Medicine Team: [facility] MED 4 Marge Jennings MD  Primary Care Team: [facility] 4    HPI:   Cecile Bowen is 73 y.o. with T2DM on insulin, Afib on Eliquis, neurogenic bladder with suprapubic catheter, CKD4 previously on dialysis, HLD, hypothyroidism, migraine, chronic pain who presents to [facility] ED for critical labs from her PCP visit. PCP instructed pt on Friday 8/04 to present to ED for bicarb less than 5 and ZAK. She felt like she was fine and waited till Sunday 08/06 to present to ED. She reports "feeling low physically" and that's what prompted her to come in. Reports good hydration but poor oral nutrition; not much food in the house. She has a suprapubic catheter that was placed 7 years ago, and was recently changed 2 wks ago, per pt. Denies smoking use or hx, alcohol use or other use of illict drugs. Reports chronic constipation, chronic back pain, intermittent productive cough, weakness, chronic bilateral floaters and lightheadedness. She is wheelchair bound because she reports no muscle tone in legs. Reports urinating well. Cannot account for dysuria as pt has catheter in place, but does report recently foul smelling urine. Denies CP, SOB, unexpected weight loss, fever, abdominal pain, diarrhea. Denies any recent changes to medications or any sick contacts. Denies recent falls; last fall was one year w/o head trauma.     Was previously on dialysis for about 6 months about a year ago, but was told "her kidneys woke up" so she was able to come off of it.  Reports that " "care is in the hands of her sister and feels that she does not receive much care.  She also reports not being very hungry usually because she tends to get nauseated by food.  See CM/SW and nursing notes for more info on housing situation.         * No surgery found *      Hospital Course:   Admitted to [facility] Medicine 4 for further evaluation and treatment of acute on chronic renal failure. Diuresed with Lasix 80 IV and given sodium bicarb gtt, which did help improve her bicarb and pH.  However, Nephrology felt her kidney function warranted resuming iHD.  Volume overloaded on admission and placed on fluid restriction. Initiated Carvedilol, titrated up as needed for optimal BP control. Discontinued home clonidine which was being used as "PRN". Discontinued bicarb gtt. Received tunnelled cath today for iHD and had 1st round of dialysis 08/08. Hgb AM fell as low as 6.7, transfused with 1u pRBC. Discontinued amlodipine and started nifedipine for more potent antihypertensive effect. HD Chair secured at [facility] for MWF schedule starting 08/11/23.  Patient stable for discharge home with home health service.       Goals of Care Treatment Preferences:  Code Status: Full Code    Health care agent: No value filed.  Health care agent number: No value filed.                   Consults:   Consults (From admission, onward)          Status Ordering Provider     Inpatient consult to Interventional Radiology  Once        Provider:  (Not yet assigned)    Completed [facility]     Inpatient consult to Nephrology  Once        Provider:  (Not yet assigned)    Completed [facility]     Inpatient consult to Social Work  Once        Provider:  (Not yet assigned)    Acknowledged LUCIAN STANTON            Final Active Diagnoses:    Diagnosis Date Noted POA    PRINCIPAL PROBLEM:  Acute kidney injury superimposed on CKD [N17.9, N18.9] 11/28/2021 Yes    Irritant contact dermatitis due to fecal, urinary or dual incontinence [L24.A2] " 08/09/2023 Yes    Hyperphosphatemia [E83.39] 08/08/2023 Yes    Bacteria in urine [R82.71] 08/06/2023 Yes    Uncomplicated opioid dependence [F11.20] 03/16/2022 Yes    Hypertension associated with diabetes [E11.59, I15.2] 01/24/2022 Yes     Chronic    A-fib [I48.91] 11/29/2021 Yes     Chronic    Type 2 diabetes mellitus with stage 4 chronic kidney disease, with long-term current use of insulin [E11.22, N18.4, Z79.4] 02/07/2020 Not Applicable     Chronic    Cervicogenic migraine [G43.809] 02/01/2019 Yes     Chronic    Morbid obesity due to excess calories [E66.01] 01/09/2017 Yes     Chronic    Suprapubic catheter [Z93.59] 09/19/2016 Not Applicable     Chronic    Neurogenic bladder [N31.9] 12/14/2015 Yes     Chronic    Normal anion gap metabolic acidosis [E87.20] 12/07/2015 Yes    VIJAYA (obstructive sleep apnea) [G47.33] 03/17/2014 Yes     Chronic    Hyperlipidemia associated with type 2 diabetes mellitus [E11.69, E78.5] 03/11/2014 Yes     Chronic    Hypothyroidism [E03.9] 12/12/2012 Yes     Chronic      Problems Resolved During this Admission:       Discharged Condition: stable    Disposition: Home-Health Care [facility]    Follow Up:    Patient Instructions:   No discharge procedures on file.    Significant Diagnostic Studies: Labs: All labs within the past 24 hours have been reviewed    Pending Diagnostic Studies:       None           Medications:  Reconciled Home Medications:      Medication List        START taking these medications      calcium acetate(phosphat bind) 667 mg tablet  Commonly known as: PHOSLO  Take 1 tablet (667 mg total) by mouth 3 (three) times daily with meals.     carvediloL 6.25 MG tablet  Commonly known as: COREG  Take 1 tablet (6.25 mg total) by mouth 2 (two) times daily.     furosemide 40 MG tablet  Commonly known as: LASIX  Take 1 tablet (40 mg total) by mouth once daily.  Start taking on: August 12, 2023     isosorbide mononitrate 30 MG 24 hr tablet  Commonly known as: IMDUR  Take 1 tablet (30 mg  "total) by mouth once daily.     magnesium oxide 400 mg (241.3 mg magnesium) tablet  Commonly known as: MAG-OX  Take 1 tablet (400 mg total) by mouth once daily.  Start taking on: August 12, 2023     NIFEdipine 90 MG (OSM) 24 hr tablet  Commonly known as: PROCARDIA-XL  Take 1 tablet (90 mg total) by mouth once daily.  Start taking on: August 12, 2023            CHANGE how you take these medications      apixaban 5 mg Tab  Commonly known as: ELIQUIS  Take 1 tablet (5 mg total) by mouth 2 (two) times daily.  What changed:   medication strength  how much to take     sumatriptan 100 MG tablet  Commonly known as: IMITREX  Take 1 tablet (100 mg total) by mouth 2 (two) times daily as needed for Migraine.  What changed: additional instructions            CONTINUE taking these medications      AIMOVIG AUTOINJECTOR 140 mg/mL autoinjector  Generic drug: erenumab-aooe  Inject 1 mL (140 mg total) into the skin every 30 days.     anastrozole 1 mg Tab  Commonly known as: ARIMIDEX  Take 1 tablet (1 mg total) by mouth once daily.     atorvastatin 80 MG tablet  Commonly known as: LIPITOR  Take 1 tablet (80 mg total) by mouth once daily.     BD INSULIN SYRINGE ULTRA-FINE 0.5 mL 31 gauge x 5/16" Syrg  Generic drug: insulin syringe-needle U-100  USE WITH INSULIN 4 TIMES A DAY     ergocalciferol 50,000 unit Cap  Commonly known as: ERGOCALCIFEROL  TAKE ONE CAPSULE BY MOUTH ONE TIME PER WEEK, TAKES ON TUESDAYS     HYDROcodone-acetaminophen  mg per tablet  Commonly known as: NORCO  Take 1 tablet by mouth every 6 (six) hours as needed for Pain.     insulin glargine 100 units/mL SubQ pen  Commonly known as: LANTUS SOLOSTAR U-100 INSULIN  INJECT 14-15 UNITS UNDER THE SKIN ONCE DAILY     methocarbamoL 750 MG Tab  Commonly known as: ROBAXIN  TAKE 1-2 TABLETS (750-1,500 MG TOTAL) BY MOUTH 3 (THREE) TIMES DAILY AS NEEDED (MUSCLE SPASMS).     ondansetron 8 MG Tbdl  Commonly known as: ZOFRAN-ODT  TAKE 1 TABLET BY MOUTH EVERY 12 HOURS AS " "NEEDED     oxybutynin 10 MG 24 hr tablet  Commonly known as: DITROPAN-XL  Take 1 tablet (10 mg total) by mouth once daily.     pen needle, diabetic 31 gauge x 5/16" Ndle  Commonly known as: BD ULTRA-FINE SHORT PEN NEEDLE  USE WITH LANTUS DAILY, e 11.65     sodium bicarbonate 650 MG tablet  Take 1 tablet (650 mg total) by mouth 3 (three) times daily.     SYNTHROID 50 MCG tablet  Generic drug: levothyroxine  TAKE 1 TABLET BY MOUTH BEFORE BREAKFAST. ADMINISTER ON AN EMPTY STOMACH AT LEAST 30 MINUTES BEFORE FOOD. IF RECEIVING TUBE FEEDS, HOLD TUBE FEEDS FOR 1 HOUR BEFORE AND AFTER LEVOTHYROXINE ADMINISTRATION.     traZODone 100 MG tablet  Commonly known as: DESYREL  TAKE 1 TABLET BY MOUTH NIGHTLY AS NEEDED FOR INSOMNIA.            STOP taking these medications      cloNIDine 0.1 MG tablet  Commonly known as: CATAPRES              Indwelling Lines/Drains at time of discharge:   Lines/Drains/Airways       Central Venous Catheter Line  Duration                  Hemodialysis Catheter 08/08/23 0827 right subclavian;right internal jugular 3 days              Drain  Duration                  Suprapubic Catheter 02/24/22 1200 latex 16 Fr. 533 days                    Time spent on the discharge of patient: 45 minutes         Marge Jennings MD  Department of Hospital Medicine  [facility] - Cardiology Stepdown  "

## 2023-08-11 NOTE — PLAN OF CARE
Problem: Adult Inpatient Plan of Care  Goal: Plan of Care Review  Outcome: Ongoing, Progressing  Goal: Patient-Specific Goal (Individualized)  Outcome: Ongoing, Progressing  Goal: Absence of Hospital-Acquired Illness or Injury  Outcome: Ongoing, Progressing  Goal: Optimal Comfort and Wellbeing  Outcome: Ongoing, Progressing  Goal: Readiness for Transition of Care  Outcome: Ongoing, Progressing     Problem: Bariatric Environmental Safety  Goal: Safety Maintained with Care  Outcome: Ongoing, Progressing     Problem: Diabetes Comorbidity  Goal: Blood Glucose Level Within Targeted Range  Outcome: Ongoing, Progressing     Problem: Fluid and Electrolyte Imbalance (Acute Kidney Injury/Impairment)  Goal: Fluid and Electrolyte Balance  Outcome: Ongoing, Progressing     Problem: Oral Intake Inadequate (Acute Kidney Injury/Impairment)  Goal: Optimal Nutrition Intake  Outcome: Ongoing, Progressing     Problem: Renal Function Impairment (Acute Kidney Injury/Impairment)  Goal: Effective Renal Function  Outcome: Ongoing, Progressing     Problem: Impaired Wound Healing  Goal: Optimal Wound Healing  Outcome: Ongoing, Progressing     Problem: Skin Injury Risk Increased  Goal: Skin Health and Integrity  Outcome: Ongoing, Progressing     Problem: Infection  Goal: Absence of Infection Signs and Symptoms  Outcome: Ongoing, Progressing     Problem: Device-Related Complication Risk (Hemodialysis)  Goal: Safe, Effective Therapy Delivery  Outcome: Ongoing, Progressing     Problem: Hemodynamic Instability (Hemodialysis)  Goal: Effective Tissue Perfusion  Outcome: Ongoing, Progressing     Problem: Infection (Hemodialysis)  Goal: Absence of Infection Signs and Symptoms  Outcome: Ongoing, Progressing     Problem: Fall Injury Risk  Goal: Absence of Fall and Fall-Related Injury  Outcome: Ongoing, Progressing

## 2023-08-11 NOTE — PROGRESS NOTES
SW contacted pt's transportation company [employer] and spoke with representative He ([phone] He informed that pt has unlimited trips under her current benefits. He provided the number pt can call to schedule a standing order for dialysis appts ([phone]).    [facility] FA Abeba JOHNSON updated via secure chat.    Inpt treatment team updated via secure chat.    CHRIS will continue to follow for outpt HD coordination.    Maru Mendoza, MARYJO  [facility]  [phone]

## 2023-08-11 NOTE — PROGRESS NOTES
"[facility] Medicine  Progress Note    Patient Name: Cecile Bowen  MRN: [id number]  Patient Class: IP- Inpatient   Admission Date: 8/6/2023  Length of Stay: 4 days  Attending Physician: Lars Gomez MD  Primary Care Provider: Lurdes Navarro MD        Subjective:     Principal Problem:Acute kidney injury superimposed on CKD        HPI:  Cecile Bowen is 73 y.o. with T2DM on insulin, Afib on Eliquis, neurogenic bladder with suprapubic catheter, CKD4 previously on dialysis, HLD, hypothyroidism, migraine, chronic pain who presents to [facility] ED for critical labs from her PCP visit. PCP instructed pt on Friday 8/04 to present to ED for bicarb less than 5 and ZAK. She felt like she was fine and waited till Sunday 08/06 to present to ED. She reports "feeling low physically" and that's what prompted her to come in. Reports good hydration but poor oral nutrition; not much food in the house. She has a suprapubic catheter that was placed 7 years ago, and was recently changed 2 wks ago, per pt. Denies smoking use or hx, alcohol use or other use of illict drugs. Reports chronic constipation, chronic back pain, intermittent productive cough, weakness, chronic bilateral floaters and lightheadedness. She is wheelchair bound because she reports no muscle tone in legs. Reports urinating well. Cannot account for dysuria as pt has catheter in place, but does report recently foul smelling urine. Denies CP, SOB, unexpected weight loss, fever, abdominal pain, diarrhea. Denies any recent changes to medications or any sick contacts. Denies recent falls; last fall was one year w/o head trauma.     Was previously on dialysis for about 6 months about a year ago, but was told "her kidneys woke up" so she was able to come off of it.  Reports that care is in the hands of her sister and feels that she does not receive much care.  She also reports not being very hungry usually because she tends to get " "nauseated by food.  See CM/SW and nursing notes for more info on housing situation.         Overview/Hospital Course:  Admitted to [facility] for further evaluation and treatment. Diuresed with Lasix 80 IV and given sodium bicarb gtt, which did help improve her bicarb and pH.  However, Nephrology felt her kidney function warranted resuming iHD. Currently volume overloaded and on fluid restriction. Initiated Carvedilol at 3.125, titrated up as needed for optimal BP control. Discontinued home clonidine which was being used as "PRN". Discontinued bicarb gtt. Received tunnelled cath today for iHD and had one round of dialysis 08/08 with Net  mls. Hgb AM was 6.7, transfused with 1u pRBC. Cr improving. Discontinued amlodipine and started nifedipine for longer acting BP coverage. Seeking outpt HD chair at [facility]; if obtained then will DC home w HH ASAP.      Interval History: NAOE. Feeling overall better. 3rd round of HD completed today. HD staff reported HA and gave pt norco. Ordered tylenol as well for HA PRN. Transitioned from amlodipine to Nifedipine for better BP control.     Review of Systems  Objective:     Vital Signs (Most Recent):  Temp: 98.7 °F (37.1 °C) (08/10/23 1634)  Pulse: 82 (08/10/23 1634)  Resp: 17 (08/10/23 1634)  BP: (!) 215/95 (08/10/23 1730)  SpO2: 99 % (08/10/23 1118) Vital Signs (24h Range):  Temp:  [97.6 °F (36.4 °C)-98.7 °F (37.1 °C)] 98.7 °F (37.1 °C)  Pulse:  [76-93] 82  Resp:  [15-20] 17  SpO2:  [96 %-99 %] 99 %  BP: (120-216)/() 215/95     Weight: 113.9 kg (251 lb)  Body mass index is 38.16 kg/m².    Intake/Output Summary (Last 24 hours) at 8/10/2023 1942  Last data filed at 8/10/2023 1801  Gross per 24 hour   Intake 2102 ml   Output 2571 ml   Net -469 ml         Physical Exam  Constitutional:       General: She is not in acute distress.     Appearance: Normal appearance.   Cardiovascular:      Rate and Rhythm: Normal rate.      Heart sounds: Murmur (holosystolic murmur) " heard.      Comments: HD line in place  Pulmonary:      Effort: Pulmonary effort is normal. No respiratory distress.      Breath sounds: Normal breath sounds.   Abdominal:      General: Bowel sounds are normal.      Tenderness: There is no abdominal tenderness.      Comments: Suprapubic catheter in place   Musculoskeletal:      Right lower leg: Edema present.      Left lower leg: Edema present.      Comments: Pitting edema bilaterally, sig improvement from previous   Skin:     General: Skin is warm and dry.   Neurological:      General: No focal deficit present.      Mental Status: She is alert.   Psychiatric:         Mood and Affect: Mood normal.             Significant Labs: All pertinent labs within the past 24 hours have been reviewed.    Significant Imaging: I have reviewed all pertinent imaging results/findings within the past 24 hours.      Assessment/Plan:      * Acute kidney injury superimposed on CKD  Normal anion gap metabolic acidosis    Patient with acute kidney injury/acute renal failure likely due to nonadherence to dialysis and volume overload that is contributing the excessive load on kidneys. ZAK is currently improving. Baseline creatinine 2.2 - Labs reviewed- Renal function/electrolytes with Estimated Creatinine Clearance: 26.3 mL/min (A) (based on SCr of 2.6 mg/dL (H)). according to latest data. Monitor urine output and serial BMP and adjust therapy as needed. Avoid nephrotoxins and renally dose meds for GFR listed above.  Electrolyte derangements and volume status were addressed medically at first, given her hemodynamic and acid/base stability in the ER.    - Per echo results from 08/07/23 EF 60% with normal systolic and estimated diastolic function, normal CVP: indicating extravascular volume overload  Tunnel cath placed on 8/7.  Pt restarted on HD on 8/8, with second HD session on 8/9.    · Pt will need HD 3x/wk  · Seeking outpatient HD chair at [facility] (where she was previously dialyzed, and  which has a Ray lift)  · Continuing to renally dose meds and avoid nephrotoxins  · Continuing renal diet      Irritant contact dermatitis due to fecal, urinary or dual incontinence        Hyperphosphatemia  · Current phos 6.4  · Continue sevalemer TID  · Daily CMP  · WCTM        Bacteria in urine  Klebsiella and Proteus.  No  sxs of dysuria, increased frequency. Afebrile during admission.  WCTM      Uncomplicated opioid dependence  - continue home [place] for unbearable chronic back pain         Hypertension associated with diabetes  Patient's FSGs are controlled on current medication regimen.  Last A1c reviewed-   Lab Results   Component Value Date    HGBA1C 6.1 (H) 08/04/2023     Most recent fingerstick glucose reviewed-   Recent Labs   Lab 08/08/23 2004 08/09/23  0757 08/09/23  1209   POCTGLUCOSE 205* 137* 215*     Current correctional scale  Low  Maintain anti-hyperglycemic dose as follows-   Antihyperglycemics (From admission, onward)    Start     Stop Route Frequency Ordered    08/07/23 0835  insulin aspart U-100 pen 0-5 Units         -- SubQ Before meals & nightly PRN 08/07/23 0819    08/06/23 2100  insulin detemir U-100 (Levemir) pen 8 Units         -- SubQ Nightly 08/06/23 1920        Hold Oral hypoglycemics while patient is in the hospital.      - DC home clonidine (written to be taken when pt has headaches) given its transient nature  - initiated COREG 6.25 mg BID, titrating up  - transitioned from amlodipine 5 mg daily to nifedipine 30mg  - PRN hydralazine for SBP >180      BP Readings from Last 1 Encounters:   08/09/23 (!) 174/78         A-fib  - home dose Eliquis was 2.5mg BID --> titrated to 5mg BID for prevention of systemic embolism    Type 2 diabetes mellitus with stage 4 chronic kidney disease, with long-term current use of insulin  Patient's FSGs are controlled on current medication regimen.  Last A1c reviewed-   Lab Results   Component Value Date    HGBA1C 6.1 (H) 08/04/2023     Most recent  fingerstick glucose reviewed-   Recent Labs   Lab 08/07/23  1956 08/08/23  1058 08/08/23  1131 08/08/23  1543   POCTGLUCOSE 202* 139* 140* 172*     Current correctional scale  Low  Maintain anti-hyperglycemic dose as follows-   Antihyperglycemics (From admission, onward)    Start     Stop Route Frequency Ordered    08/07/23 0835  insulin aspart U-100 pen 0-5 Units         -- SubQ Before meals & nightly PRN 08/07/23 0819    08/06/23 2100  insulin detemir U-100 (Levemir) pen 8 Units         -- SubQ Nightly 08/06/23 1920        Hold Oral hypoglycemics while patient is in the hospital. Home dose includes 15 units lantus, but following IP management.     PLAN:  - Goal blood glucose range while admitted: 140-180 per the NICE-SUGAR trial and American Diabetes Association guidelines  - Detemir 8 units qhs  - SSI with POCT accuchecks ACHS and Diabetic diet 1500 duyen with fluid restriction 1500mL  - Diabetic counseling and education (eg nutrition, insulin) to be given prior to discharge      Cervicogenic migraine  - Discontinued sumatriptan due to c/f elevated BP  - WCTM      Morbid obesity due to excess calories  Body mass index is 38.16 kg/m². Morbid obesity complicates all aspects of disease management from diagnostic modalities to treatment. Weight loss encouraged and health benefits explained to patient.         Suprapubic catheter  Neurogenic Bladder    Placed 7 yrs ago. Last changed 2 wks ago.       Neurogenic bladder        Normal anion gap metabolic acidosis  Improving w HD      VIJAYA (obstructive sleep apnea)  - CPAP ordered      Hyperlipidemia associated with type 2 diabetes mellitus  - continue home atorvastatin      Hypothyroidism  - continue home synthroid        VTE Risk Mitigation (From admission, onward)         Ordered     heparin (porcine) injection 1,000 Units  As needed (PRN)         08/08/23 1353     apixaban tablet 5 mg  2 times daily         08/07/23 0941                Discharge Planning   FLORENTINO: 8/11/2023      Code Status: Full Code   Is the patient medically ready for discharge?: Yes    Reason for patient still in hospital (select all that apply): Pending disposition  Discharge Plan A: Home with family   Discharge Delays: None known at this time              Marge Jennings MD  Department of Hospital Medicine   [facility] - Cardiology Stepdown

## 2023-08-11 NOTE — ASSESSMENT & PLAN NOTE
Body mass index is 38.16 kg/m². Morbid obesity complicates all aspects of disease management from diagnostic modalities to treatment. Weight loss encouraged and health benefits explained to patient.

## 2023-08-11 NOTE — ASSESSMENT & PLAN NOTE
Patient with CKD4 (Cr Bl 2.2) previously on dialysis, has not undergone HD in several months. She was sent to the ED by her PCP due to bicarb < 5. On admission, bicarb was 7, Cr was 4.7. Patient was started on bicarb drip with increase in bicarb to 13. VBG with pH 7.2. Patient received 80 of lasix with 1650cc urine output. Urine Na=32, UPCR=5. Etiology ZAK vs progression of CKD. Patient s/p HDx2 with improvement in RFP.    Acid/base: Metabolic acidosis, improved, likely secondary to renal failure and inability to filter H  ZAK: ZAK of unknown etiology vs CKD progression  MBD: PTH = 196, Corrected Ca = 9.9, start Vit D supplementation  Anemia: Hgb of 7.3 today, anemia workup today  Volume status: Difficult to assess but patient is breathing well on RA and LE edema is improving    Recommendations:     -Pt initiated HD this week, 3rd session on 8/10. No indication for RRT today. Will eval for HD tomorrow 8/12. May only need HD biweekly   - Agree with lasix 40mg  -Iron studies reviewed, start vennofer with next HD   - Can increase fluid restriction to 1.5L  - , vitamin D 15, agree with starting supplementation  - Continue sevelemer 800 TID AC  - Replete to keep K ~ 4   - Renally dose meds  - Keep blood pressure as stable as possible, will monitor  - Avoid contrast with imaging   - Strict I/Os

## 2023-08-11 NOTE — PROGRESS NOTES
[facility] - Cardiology Stepdown  Nephrology  Progress Note    Patient Name: Cecile Bowen  MRN: [id number]  Admission Date: 8/6/2023  Hospital Length of Stay: 5 days  Attending Provider: Lars Gomez MD   Primary Care Physician: Lurdes Navarro MD  Principal Problem:Acute kidney injury superimposed on CKD    Subjective:     Interval History: 3rd HD treatment yesterday. 0 UF. Pt c/o of headache at the end of treatment. 24 UOP 1.4L. Continues on lasix 40 PO daily. SBP 130s-150/60s-70s. Pending outpatient HD chair.     Review of patient's allergies indicates:  No Known Allergies  Current Facility-Administered Medications   Medication Frequency    [START ON 8/12/2023] 0.9%  NaCl infusion Once    acetaminophen tablet 650 mg Q6H PRN    anastrozole tablet 1 mg Daily    apixaban tablet 5 mg BID    atorvastatin tablet 80 mg Daily    carvediloL tablet 6.25 mg BID    dextrose 10% bolus 125 mL 125 mL PRN    dextrose 10% bolus 250 mL 250 mL PRN    ergocalciferol capsule 50,000 Units Q7 Days    furosemide tablet 40 mg Daily    glucagon (human recombinant) injection 1 mg PRN    glucose chewable tablet 16 g PRN    glucose chewable tablet 24 g PRN    heparin (porcine) injection 1,000 Units PRN    hydrALAZINE tablet 50 mg Q8H PRN    HYDROcodone-acetaminophen  mg per tablet 1 tablet Q6H PRN    insulin aspart U-100 pen 0-5 Units QID (AC + HS) PRN    insulin detemir U-100 (Levemir) pen 8 Units QHS    levothyroxine tablet 50 mcg Before breakfast    magnesium oxide tablet 400 mg Daily    methocarbamoL tablet 1,000 mg TID    mupirocin 2 % ointment BID    NIFEdipine 24 hr tablet 30 mg Daily    ondansetron disintegrating tablet 8 mg Q12H PRN    oxybutynin 24 hr tablet 10 mg Daily    sevelamer carbonate tablet 800 mg TID WM    sumatriptan tablet 100 mg BID PRN    traZODone tablet 100 mg QHS       Objective:     Vital Signs (Most Recent):  Temp: 98.8 °F (37.1 °C) (08/11/23 0746)  Pulse: 86 (08/11/23 1028)  Resp: 16  (08/11/23 0746)  BP: 138/67 (08/11/23 1028)  SpO2: (!) 94 % (08/11/23 0746) Vital Signs (24h Range):  Temp:  [97.6 °F (36.4 °C)-98.8 °F (37.1 °C)] 98.8 °F (37.1 °C)  Pulse:  [] 86  Resp:  [15-18] 16  SpO2:  [94 %-99 %] 94 %  BP: (120-216)/() 138/67     Weight: 113.9 kg (251 lb) (08/07/23 0937)  Body mass index is 38.16 kg/m².  Body surface area is 2.34 meters squared.    I/O last 3 completed shifts:  In: 2582 [P.O.:1782; I.V.:300; Other:500]  Out: 3121 [Urine:2450; Other:671]     Physical Exam  Vitals and nursing note reviewed.   Constitutional:       General: She is not in acute distress.     Appearance: Normal appearance.   Cardiovascular:      Rate and Rhythm: Normal rate.      Heart sounds: Murmur (holosystolic murmur) heard.      Comments: HD line in place  Pulmonary:      Effort: Pulmonary effort is normal. No respiratory distress.      Breath sounds: Normal breath sounds.   Abdominal:      General: Bowel sounds are normal.      Tenderness: There is no abdominal tenderness.      Comments: Suprapubic catheter in place   Musculoskeletal:      Right lower leg: Edema present.      Left lower leg: Edema present.      Comments: Pitting edema bilaterally, sig improvement from previous   Skin:     General: Skin is warm and dry.   Neurological:      General: No focal deficit present.      Mental Status: She is alert.   Psychiatric:         Mood and Affect: Mood normal.          Significant Labs:  CBC:   Recent Labs   Lab 08/11/23  0558   WBC 11.44   RBC 2.88*   HGB 7.8*   HCT 24.8*      MCV 86   MCH 27.1   MCHC 31.5*     CMP:   Recent Labs   Lab 08/11/23  0558   *   CALCIUM 7.5*   ALBUMIN 2.1*   PROT 4.8*      K 3.9   CO2 23      BUN 24*   CREATININE 2.0*   ALKPHOS 69   ALT <5*   AST 11   BILITOT 0.2     All labs within the past 24 hours have been reviewed.       Assessment/Plan:     Renal/  * Acute kidney injury superimposed on CKD  Patient with CKD4 (Cr Bl 2.2) previously on  dialysis, has not undergone HD in several months. She was sent to the ED by her PCP due to bicarb < 5. On admission, bicarb was 7, Cr was 4.7. Patient was started on bicarb drip with increase in bicarb to 13. VBG with pH 7.2. Patient received 80 of lasix with 1650cc urine output. Urine Na=32, UPCR=5. Etiology ZAK vs progression of CKD. Patient s/p HDx2 with improvement in RFP.    Acid/base: Metabolic acidosis, improved, likely secondary to renal failure and inability to filter H  ZAK: ZAK of unknown etiology vs CKD progression  MBD: PTH = 196, Corrected Ca = 9.9, start Vit D supplementation  Anemia: Hgb of 7.3 today, anemia workup today  Volume status: Difficult to assess but patient is breathing well on RA and LE edema is improving    Recommendations:     -Pt initiated HD this week, 3rd session on 8/10. No indication for RRT today. Will eval for HD tomorrow 8/12. May only need HD biweekly   - Agree with lasix 40mg  -Iron studies reviewed, start vennofer with next HD   - Can increase fluid restriction to 1.5L  - , vitamin D 15, agree with starting supplementation  - Continue sevelemer 800 TID AC  - Renally dose meds  - Keep blood pressure as stable as possible, will monitor  - Avoid contrast with imaging   - Strict I/Os        Thank you for your consult. I will follow-up with patient. Please contact us if you have any additional questions.    Lamar Negro, MUNA  Nephrology  [facility] - Cardiology Stepdown

## 2023-08-11 NOTE — SUBJECTIVE & OBJECTIVE
Interval History: NAOE. Feeling overall better. 3rd round of HD completed today. HD staff reported HA and gave pt norco. Ordered tylenol as well for HA PRN. Transitioned from amlodipine to Nifedipine for better BP control.     Review of Systems  Objective:     Vital Signs (Most Recent):  Temp: 98.7 °F (37.1 °C) (08/10/23 1634)  Pulse: 82 (08/10/23 1634)  Resp: 17 (08/10/23 1634)  BP: (!) 215/95 (08/10/23 1730)  SpO2: 99 % (08/10/23 1118) Vital Signs (24h Range):  Temp:  [97.6 °F (36.4 °C)-98.7 °F (37.1 °C)] 98.7 °F (37.1 °C)  Pulse:  [76-93] 82  Resp:  [15-20] 17  SpO2:  [96 %-99 %] 99 %  BP: (120-216)/() 215/95     Weight: 113.9 kg (251 lb)  Body mass index is 38.16 kg/m².    Intake/Output Summary (Last 24 hours) at 8/10/2023 1942  Last data filed at 8/10/2023 1801  Gross per 24 hour   Intake 2102 ml   Output 2571 ml   Net -469 ml         Physical Exam  Constitutional:       General: She is not in acute distress.     Appearance: Normal appearance.   Cardiovascular:      Rate and Rhythm: Normal rate.      Heart sounds: Murmur (holosystolic murmur) heard.      Comments: HD line in place  Pulmonary:      Effort: Pulmonary effort is normal. No respiratory distress.      Breath sounds: Normal breath sounds.   Abdominal:      General: Bowel sounds are normal.      Tenderness: There is no abdominal tenderness.      Comments: Suprapubic catheter in place   Musculoskeletal:      Right lower leg: Edema present.      Left lower leg: Edema present.      Comments: Pitting edema bilaterally, sig improvement from previous   Skin:     General: Skin is warm and dry.   Neurological:      General: No focal deficit present.      Mental Status: She is alert.   Psychiatric:         Mood and Affect: Mood normal.             Significant Labs: All pertinent labs within the past 24 hours have been reviewed.    Significant Imaging: I have reviewed all pertinent imaging results/findings within the past 24 hours.

## 2023-08-12 NOTE — PLAN OF CARE
[facility] - Cardiology Stepdown  Discharge Final Note    Primary Care Provider: Lurdes Navarro MD    Expected Discharge Date: 8/11/2023    Patient is current with [facility].   [facility]    Wheelchair van requested with [facility] for transport home.    Final Discharge Note (most recent)       Final Note - 08/12/23 0829          Final Note    Assessment Type Final Discharge Note     Anticipated Discharge Disposition Home-Health Care Svc        Post-Acute Status    Post-Acute Authorization Home Health     Home Health Status Set-up Complete/Auth obtained     Diaylsis Status Referrals Sent                     Important Message from Medicare  Important Message from Medicare regarding Discharge Appeal Rights: Given to patient/caregiver, Explained to patient/caregiver, Signed/date by patient/caregiver     Date IMM was signed: 08/11/23  Time IMM was signed: 1121      Future Appointments   Date Time Provider Department Center   8/15/2023  1:00 PM Tesha Stafford MD [facility] PHYSMED [address]   8/18/2023  8:00 AM Rylee Cheng NP [address]   8/25/2023  9:10 AM LAB, APPOINTMENT [facility] IN[facility]ED NOM LAB IM [address] [facility]   8/28/2023  2:00 PM Brooke Gabriel APRN, JOCELINEP [facility] IM [address] [facility]   9/7/2023  2:20 PM PRIV PRE-ADMIT, ENDO -[facility]   10/11/2023  8:45 AM LALITO Maria II, MD [facility]   10/26/2023 11:15 AM Regan Brock DPM NOM POD [address] [facility]   1/11/2024  2:00 PM Gavin Zuluaga DO [facility]

## 2023-08-12 NOTE — NURSING
AAOX4,VSS,O2 sats>92% on room air. Plan of care discussed with patient. Patient has no complaints of pain/SOB. Patient being turned routinely to prevent pressure injury. Catheter care performed with CHG. Discussed medications and care. Patient has no questions at this time.Pt visualised and stable.Call light within reach.Pt resting,bed at lowest position.     Patient being transported home via ambulance. norris Cotton RN aware of plan. Medications delivered bedside by [facility]. Sister to be home to accept patient when she arrives home.

## 2023-08-12 NOTE — PLAN OF CARE
Problem: Adult Inpatient Plan of Care  Goal: Plan of Care Review  Outcome: Met  Goal: Patient-Specific Goal (Individualized)  Outcome: Met  Goal: Absence of Hospital-Acquired Illness or Injury  Outcome: Met  Goal: Optimal Comfort and Wellbeing  Outcome: Met  Goal: Readiness for Transition of Care  Outcome: Met     Problem: Bariatric Environmental Safety  Goal: Safety Maintained with Care  Outcome: Met     Problem: Diabetes Comorbidity  Goal: Blood Glucose Level Within Targeted Range  Outcome: Met     Problem: Fluid and Electrolyte Imbalance (Acute Kidney Injury/Impairment)  Goal: Fluid and Electrolyte Balance  Outcome: Met     Problem: Oral Intake Inadequate (Acute Kidney Injury/Impairment)  Goal: Optimal Nutrition Intake  Outcome: Met     Problem: Renal Function Impairment (Acute Kidney Injury/Impairment)  Goal: Effective Renal Function  Outcome: Met     Problem: Impaired Wound Healing  Goal: Optimal Wound Healing  Outcome: Met     Problem: Skin Injury Risk Increased  Goal: Skin Health and Integrity  Outcome: Met     Problem: Infection  Goal: Absence of Infection Signs and Symptoms  Outcome: Met     Problem: Device-Related Complication Risk (Hemodialysis)  Goal: Safe, Effective Therapy Delivery  Outcome: Met     Problem: Hemodynamic Instability (Hemodialysis)  Goal: Effective Tissue Perfusion  Outcome: Met     Problem: Infection (Hemodialysis)  Goal: Absence of Infection Signs and Symptoms  Outcome: Met     Problem: Fall Injury Risk  Goal: Absence of Fall and Fall-Related Injury  Outcome: Met

## 2023-08-12 NOTE — NURSING
Patient was discharge home via ambulance.  Patient left at 2100 by stretcher escorted by paramedics. Patient was in stable condition at the time of discharge.  Vital signs WNL. Telemetry removed.  Discharge paperwork reviewed and copy was provided to patient.

## 2023-08-14 ENCOUNTER — PATIENT OUTREACH (OUTPATIENT)
Dept: ADMINISTRATIVE | Facility: CLINIC | Age: 73
End: 2023-08-14
Payer: MEDICARE

## 2023-08-14 NOTE — PHYSICIAN QUERY
"PT Name: Cecile Bowen  MR #: [id number]    DOCUMENTATION CLARIFICATION      CDS/: Dm Garcia RN, CCDS              Contact information:    [email or web address]       This form is a permanent document in the medical record.     Query Date: August 14, 2023    By submitting this query, we are merely seeking further clarification of documentation. Please utilize your independent clinical judgment when addressing the question(s) below.    The Medical Record contains the following:   Indicators   Supporting Clinical Findings Location in Medical Record   x Hypertension associated with diabetes documented Hypertension associated with diabetes;   -Hold Oral hypoglycemics while patient is in the hospital.  - holding home clonidine, will initiate better BP meds regiment   - PRN hydralazine for SBP >180    Initiated Carvedilol at 3.125, titrated up as needed for optimal BP control.  Discontinued amlodipine and started nifedipine for longer acting BP coverage  Final Active Diagnoses:   Hypertension associated with diabetes    8/6  H&P: [facility] ([facility])            8/11 Discharge summary    x Chronic condition(s) HX:  T2DM on insulin, Afib on Eliquis, neurogenic bladder with suprapubic catheter, CKD4 previously on dialysis, HLD, hypothyroidism, migraine, chronic pain   8/6 H&P; [facility]   x Vital signs BP: (160-200)/(84-87) 200/85  BP: (145-217)/(61-96) 192/80  BP: (111-210)/(53-97) 195/86  BP: (126-195)/(60-86) 174/74  BP: (120-216)/() 215/95 8/6 H&P:   8/7 [facility]  8/8  [facility]  8/9    8/1       Treatment/Medication     x Other 8/6 Glucose: 153  8/7    "            139  8/8      "          139  8/9     "          127  8/10      "       128   Labs     Provider, please clarify the relationship between the Hypertension and Diabetes Mellitus  [   ] Hypertension is a manifestation of Diabetes Mellitus (Secondary Hypertension)   [   ]  Hypertension is not a manifestation of Diabetes Mellitus (Essential Hypertension)   [ X "  ] Other Clarification (please specify): __ Hypertension is indirectly a manifestation of diabetes mellitus.  It is fair to say that is a manifestation of her type 2 diabetes mellitus.  However, the more proximal, more potent cause is likely her chronic kidney disease/ESRD.  __________       Please document in your progress notes daily for the duration of treatment, until resolved, and include in your discharge summary.

## 2023-08-14 NOTE — PHYSICIAN QUERY
"PT Name: Cecile Bowen  MR #: [id number]  DOCUMENTATION CLARIFICATION     CDS/: Dm Garcia RN, CCDS               Contact information:   [email or web address]     This form is a permanent document in the medical record.     Query Date: August 14, 2023    By submitting this query, we are merely seeking further clarification of documentation. Please utilize your independent clinical judgment when addressing the question(s) below.    The Medical Record contains the following:   Indicators Supporting Clinical Findings Location in Medical Record   x CKD or Chronic Kidney (Renal) Failure/Disease  Principal Problem:Acute renal failure superimposed on stage 5 chronic kidney disease, not on chronic dialysis  Was previously on dialysis for about 6 months about a year ago, but was told "her kidneys woke up" so she was able to come off of it.    Etiology ZAK vs progression of CKD    Patient with CKD4 (Cr Bl 2.2) previously on dialysis, has not undergone HD in several months. She was sent to the ED by her PCP due to bicarb < 5. On admission, bicarb was 7, Cr was 4.7  --Acid/base: Metabolic acidosis, improved, likely secondary to renal failure and inability to filter H  --Pt initiated HD this week, 3rd session on 8/10. No indication for RRT today. Will eval for HD tomorrow 8/12.   May only need HD biweekly - Agree with lasix 40mg    HD Chair secured at [facility] for MWF schedule starting 08/11/23.  Acute kidney injury superimposed on CKD     8/6  H&P: Hosp med. ()          8/8  Nephro    8/11  Nephrology (Nephro)   x BUN/Creatinine                          GFR 8/6 GFR: 9.4  8/7    "    10.2,  9.6,    8/8     "     9.9  8/9       "    12.9  8/10      "   19.1   Labs     Dehydration      Nausea / Vomiting     x Dialysis / CRRT 8/8 HD: 2 hour dialysis complete; Net  mls.  8/9 HD: 559 ml removed  8/10 HD: Net fluid removed: 0 ml . Pt started c/o headache during tx, UF turned off, Dialysis note   "   "    Medication "     x Treatment Patient received 80 of lasix with 1650cc urine output. Urine Na=32, UPCR=5.     I/O last 3 completed shifts:  In: 1559.8 [P.O.:760; I.V.:199.8; Blood:400; Other:200]  Out: 3700 [Urine:2275; Other:1425]     3L urine output yesterday on 40mg lasix PO   8/8 Nephro.       8/9 Nephro        8/10  Nephro   x Other Chronic Conditions T2DM on insulin, Afib on Eliquis, neurogenic bladder with suprapubic catheter, CKD4 previously on dialysis, HLD, hypothyroidism, migraine, chronic pain    8/6  H&P: [facility]    Other        Provider, due to conflicting documentation, please further specify the stage of Chronic Kidney Disease (CKD):    [   ] Chronic Kidney Disease (CKD) stage 4 - Severely decreased eGFR 15-29   [   ] Chronic Kidney Disease (CKD) stage 5 - Kidney failure eGFR <15   [   ] Chronic Kidney Disease (CKD) stage 5 on chronic dialysis   [  X ] End Stage Renal Disease (ESRD) - Kidney failure, requiring renal replacement therapy or transplant, eGFR <15 (for 3 or more months)   [   ] Other (please specify): _________________     Please document in your progress notes daily for the duration of treatment until resolved and include in your discharge summary.    Reference:     JULIO CESAR Perez MD, & CAROLE Sung MD, MS. (2020, June 18). Definition and staging of chronic kidney disease in adults ([id number] LALITO Joaquin MD, ScD & [id number] JAUN Mejia MD, MSc, Eds.). Retrieved October 21, 2020, from https://www.[employer]Online/contents/definition-and-staging-of-chronic-kidney-disease-in-adults?search=ckd%20staging&source=search_result&selectedTitle=1~150&usage_type=default&display_rank=1    Form No. 25226

## 2023-08-15 ENCOUNTER — TELEPHONE (OUTPATIENT)
Dept: INTERNAL MEDICINE | Facility: CLINIC | Age: 73
End: 2023-08-15
Payer: MEDICARE

## 2023-08-15 NOTE — TELEPHONE ENCOUNTER
----- Message from Sima Weldon sent at 8/15/2023  9:58 AM CDT -----  Contact: [phone] Patient  Patient needs a Hosp follow up appt with their PCP only.     When is the next available appointment:  10/09/2023    Discharge date:08/11/2023    Needs to be seen by: 08/21/23    Would you prefer an answer via [employer]?: call back     Comments:

## 2023-08-16 NOTE — TELEPHONE ENCOUNTER
Spoke to pt. Pt scheduled for a virtual hospital d/c f/u on Monday, 8/21/23 at 11:30 AM. Any earlier availabilities will interfere with her dialysis.

## 2023-08-17 ENCOUNTER — HOSPITAL ENCOUNTER (OUTPATIENT)
Facility: HOSPITAL | Age: 73
Discharge: HOME | End: 2023-08-18
Attending: EMERGENCY MEDICINE | Admitting: EMERGENCY MEDICINE
Payer: MEDICARE

## 2023-08-17 DIAGNOSIS — M79.89 LEG SWELLING: ICD-10-CM

## 2023-08-17 DIAGNOSIS — N18.6 ESRD (END STAGE RENAL DISEASE): ICD-10-CM

## 2023-08-17 DIAGNOSIS — E87.70 VOLUME OVERLOAD: Primary | ICD-10-CM

## 2023-08-17 DIAGNOSIS — R07.9 CHEST PAIN: ICD-10-CM

## 2023-08-17 LAB
ALBUMIN SERPL BCP-MCNC: 2.7 G/DL (ref 3.5–5.2)
ALLENS TEST: ABNORMAL
ALP SERPL-CCNC: 120 U/L (ref 55–135)
ALT SERPL W/O P-5'-P-CCNC: 11 U/L (ref 10–44)
ANION GAP SERPL CALC-SCNC: 10 MMOL/L (ref 8–16)
AST SERPL-CCNC: 16 U/L (ref 10–40)
BASOPHILS # BLD AUTO: 0.04 K/UL (ref 0–0.2)
BASOPHILS NFR BLD: 0.4 % (ref 0–1.9)
BILIRUB SERPL-MCNC: 0.3 MG/DL (ref 0.1–1)
BNP SERPL-MCNC: 495 PG/ML (ref 0–99)
BUN SERPL-MCNC: 44 MG/DL (ref 8–23)
CALCIUM SERPL-MCNC: 8.3 MG/DL (ref 8.7–10.5)
CHLORIDE SERPL-SCNC: 96 MMOL/L (ref 95–110)
CO2 SERPL-SCNC: 22 MMOL/L (ref 23–29)
CREAT SERPL-MCNC: 3 MG/DL (ref 0.5–1.4)
DELSYS: ABNORMAL
DIFFERENTIAL METHOD: ABNORMAL
EOSINOPHIL # BLD AUTO: 0.3 K/UL (ref 0–0.5)
EOSINOPHIL NFR BLD: 3.2 % (ref 0–8)
ERYTHROCYTE [DISTWIDTH] IN BLOOD BY AUTOMATED COUNT: 14.2 % (ref 11.5–14.5)
EST. GFR  (NO RACE VARIABLE): 16.1 ML/MIN/1.73 M^2
GLUCOSE SERPL-MCNC: 114 MG/DL (ref 70–110)
HCO3 UR-SCNC: 25.1 MMOL/L (ref 24–28)
HCT VFR BLD AUTO: 28.8 % (ref 37–48.5)
HGB BLD-MCNC: 8.7 G/DL (ref 12–16)
IMM GRANULOCYTES # BLD AUTO: 0.06 K/UL (ref 0–0.04)
IMM GRANULOCYTES NFR BLD AUTO: 0.6 % (ref 0–0.5)
LACTATE SERPL-SCNC: 0.6 MMOL/L (ref 0.5–2.2)
LYMPHOCYTES # BLD AUTO: 1.9 K/UL (ref 1–4.8)
LYMPHOCYTES NFR BLD: 19 % (ref 18–48)
MAGNESIUM SERPL-MCNC: 1.8 MG/DL (ref 1.6–2.6)
MCH RBC QN AUTO: 26.8 PG (ref 27–31)
MCHC RBC AUTO-ENTMCNC: 30.2 G/DL (ref 32–36)
MCV RBC AUTO: 89 FL (ref 82–98)
MODE: ABNORMAL
MONOCYTES # BLD AUTO: 0.8 K/UL (ref 0.3–1)
MONOCYTES NFR BLD: 8.1 % (ref 4–15)
NEUTROPHILS # BLD AUTO: 6.8 K/UL (ref 1.8–7.7)
NEUTROPHILS NFR BLD: 68.7 % (ref 38–73)
NRBC BLD-RTO: 0 /100 WBC
PCO2 BLDA: 55.9 MMHG (ref 35–45)
PH SMN: 7.26 [PH] (ref 7.35–7.45)
PHOSPHATE SERPL-MCNC: 4.3 MG/DL (ref 2.7–4.5)
PLATELET # BLD AUTO: 252 K/UL (ref 150–450)
PMV BLD AUTO: 9.5 FL (ref 9.2–12.9)
PO2 BLDA: 18 MMHG (ref 40–60)
POC BE: -2 MMOL/L
POC SATURATED O2: 20 % (ref 95–100)
POC TCO2: 27 MMOL/L (ref 24–29)
POTASSIUM SERPL-SCNC: 4.7 MMOL/L (ref 3.5–5.1)
PROT SERPL-MCNC: 6.1 G/DL (ref 6–8.4)
RBC # BLD AUTO: 3.25 M/UL (ref 4–5.4)
SAMPLE: ABNORMAL
SITE: ABNORMAL
SODIUM SERPL-SCNC: 128 MMOL/L (ref 136–145)
WBC # BLD AUTO: 9.92 K/UL (ref 3.9–12.7)

## 2023-08-17 PROCEDURE — 83605 ASSAY OF LACTIC ACID: CPT | Mod: HCNC | Performed by: EMERGENCY MEDICINE

## 2023-08-17 PROCEDURE — 99223 1ST HOSP IP/OBS HIGH 75: CPT | Mod: HCNC,,, | Performed by: NURSE PRACTITIONER

## 2023-08-17 PROCEDURE — 83880 ASSAY OF NATRIURETIC PEPTIDE: CPT | Mod: HCNC | Performed by: EMERGENCY MEDICINE

## 2023-08-17 PROCEDURE — 80053 COMPREHEN METABOLIC PANEL: CPT | Mod: HCNC | Performed by: EMERGENCY MEDICINE

## 2023-08-17 PROCEDURE — 99900035 HC TECH TIME PER 15 MIN (STAT): Mod: HCNC

## 2023-08-17 PROCEDURE — 93005 ELECTROCARDIOGRAM TRACING: CPT | Mod: HCNC

## 2023-08-17 PROCEDURE — 85025 COMPLETE CBC W/AUTO DIFF WBC: CPT | Mod: HCNC | Performed by: EMERGENCY MEDICINE

## 2023-08-17 PROCEDURE — 83735 ASSAY OF MAGNESIUM: CPT | Mod: HCNC | Performed by: EMERGENCY MEDICINE

## 2023-08-17 PROCEDURE — G0378 HOSPITAL OBSERVATION PER HR: HCPCS | Mod: HCNC

## 2023-08-17 PROCEDURE — 84100 ASSAY OF PHOSPHORUS: CPT | Mod: HCNC | Performed by: EMERGENCY MEDICINE

## 2023-08-17 PROCEDURE — 25000003 PHARM REV CODE 250: Mod: HCNC | Performed by: NURSE PRACTITIONER

## 2023-08-17 PROCEDURE — 93010 EKG 12-LEAD: ICD-10-PCS | Mod: HCNC,,, | Performed by: INTERNAL MEDICINE

## 2023-08-17 PROCEDURE — 99285 EMERGENCY DEPT VISIT HI MDM: CPT | Mod: 25,HCNC

## 2023-08-17 PROCEDURE — 99223 PR INITIAL HOSPITAL CARE,LEVL III: ICD-10-PCS | Mod: HCNC,,, | Performed by: NURSE PRACTITIONER

## 2023-08-17 PROCEDURE — 93010 ELECTROCARDIOGRAM REPORT: CPT | Mod: HCNC,,, | Performed by: INTERNAL MEDICINE

## 2023-08-17 PROCEDURE — 96372 THER/PROPH/DIAG INJ SC/IM: CPT | Performed by: NURSE PRACTITIONER

## 2023-08-17 PROCEDURE — 82803 BLOOD GASES ANY COMBINATION: CPT | Mod: HCNC

## 2023-08-17 RX ORDER — METHOCARBAMOL 500 MG/1
1000 TABLET, FILM COATED ORAL 3 TIMES DAILY
Status: DISCONTINUED | OUTPATIENT
Start: 2023-08-17 | End: 2023-08-19 | Stop reason: HOSPADM

## 2023-08-17 RX ORDER — SODIUM CHLORIDE 0.9 % (FLUSH) 0.9 %
10 SYRINGE (ML) INJECTION EVERY 12 HOURS PRN
Status: DISCONTINUED | OUTPATIENT
Start: 2023-08-17 | End: 2023-08-17

## 2023-08-17 RX ORDER — NALOXONE HCL 0.4 MG/ML
0.02 VIAL (ML) INJECTION
Status: DISCONTINUED | OUTPATIENT
Start: 2023-08-17 | End: 2023-08-19 | Stop reason: HOSPADM

## 2023-08-17 RX ORDER — DULOXETIN HYDROCHLORIDE 20 MG/1
30 CAPSULE, DELAYED RELEASE ORAL DAILY
COMMUNITY
End: 2024-02-12 | Stop reason: SDUPTHER

## 2023-08-17 RX ORDER — FUROSEMIDE 40 MG/1
40 TABLET ORAL DAILY
Status: DISCONTINUED | OUTPATIENT
Start: 2023-08-18 | End: 2023-08-19 | Stop reason: HOSPADM

## 2023-08-17 RX ORDER — GLUCAGON 1 MG
1 KIT INJECTION
Status: DISCONTINUED | OUTPATIENT
Start: 2023-08-17 | End: 2023-08-19 | Stop reason: HOSPADM

## 2023-08-17 RX ORDER — LANOLIN ALCOHOL/MO/W.PET/CERES
400 CREAM (GRAM) TOPICAL DAILY
Status: DISCONTINUED | OUTPATIENT
Start: 2023-08-18 | End: 2023-08-19 | Stop reason: HOSPADM

## 2023-08-17 RX ORDER — SEVELAMER CARBONATE 800 MG/1
800 TABLET, FILM COATED ORAL
Status: DISCONTINUED | OUTPATIENT
Start: 2023-08-17 | End: 2023-08-19 | Stop reason: HOSPADM

## 2023-08-17 RX ORDER — AMOXICILLIN 250 MG
1 CAPSULE ORAL 2 TIMES DAILY
Status: DISCONTINUED | OUTPATIENT
Start: 2023-08-17 | End: 2023-08-19 | Stop reason: HOSPADM

## 2023-08-17 RX ORDER — SODIUM CHLORIDE 0.9 % (FLUSH) 0.9 %
10 SYRINGE (ML) INJECTION
Status: DISCONTINUED | OUTPATIENT
Start: 2023-08-17 | End: 2023-08-19 | Stop reason: HOSPADM

## 2023-08-17 RX ORDER — ATORVASTATIN CALCIUM 40 MG/1
80 TABLET, FILM COATED ORAL DAILY
Status: DISCONTINUED | OUTPATIENT
Start: 2023-08-18 | End: 2023-08-19 | Stop reason: HOSPADM

## 2023-08-17 RX ORDER — NIFEDIPINE 30 MG/1
90 TABLET, EXTENDED RELEASE ORAL DAILY
Status: DISCONTINUED | OUTPATIENT
Start: 2023-08-18 | End: 2023-08-19 | Stop reason: HOSPADM

## 2023-08-17 RX ORDER — TALC
6 POWDER (GRAM) TOPICAL NIGHTLY PRN
Status: DISCONTINUED | OUTPATIENT
Start: 2023-08-17 | End: 2023-08-19 | Stop reason: HOSPADM

## 2023-08-17 RX ORDER — IBUPROFEN 200 MG
16 TABLET ORAL
Status: DISCONTINUED | OUTPATIENT
Start: 2023-08-17 | End: 2023-08-19 | Stop reason: HOSPADM

## 2023-08-17 RX ORDER — CARVEDILOL 3.12 MG/1
6.25 TABLET ORAL 2 TIMES DAILY
Status: DISCONTINUED | OUTPATIENT
Start: 2023-08-17 | End: 2023-08-19 | Stop reason: HOSPADM

## 2023-08-17 RX ORDER — AMOXICILLIN 250 MG
1 CAPSULE ORAL DAILY
Status: DISCONTINUED | OUTPATIENT
Start: 2023-08-18 | End: 2023-08-17

## 2023-08-17 RX ORDER — PROCHLORPERAZINE EDISYLATE 5 MG/ML
5 INJECTION INTRAMUSCULAR; INTRAVENOUS EVERY 6 HOURS PRN
Status: DISCONTINUED | OUTPATIENT
Start: 2023-08-17 | End: 2023-08-19 | Stop reason: HOSPADM

## 2023-08-17 RX ORDER — TRAZODONE HYDROCHLORIDE 100 MG/1
100 TABLET ORAL NIGHTLY
Status: DISCONTINUED | OUTPATIENT
Start: 2023-08-17 | End: 2023-08-19 | Stop reason: HOSPADM

## 2023-08-17 RX ORDER — POLYETHYLENE GLYCOL 3350 17 G/17G
17 POWDER, FOR SOLUTION ORAL 2 TIMES DAILY PRN
Status: DISCONTINUED | OUTPATIENT
Start: 2023-08-17 | End: 2023-08-19 | Stop reason: HOSPADM

## 2023-08-17 RX ORDER — ACETAMINOPHEN 325 MG/1
650 TABLET ORAL EVERY 6 HOURS PRN
Status: DISCONTINUED | OUTPATIENT
Start: 2023-08-17 | End: 2023-08-19 | Stop reason: HOSPADM

## 2023-08-17 RX ORDER — HYDROCODONE BITARTRATE AND ACETAMINOPHEN 10; 325 MG/1; MG/1
1 TABLET ORAL EVERY 6 HOURS PRN
Status: DISCONTINUED | OUTPATIENT
Start: 2023-08-17 | End: 2023-08-19 | Stop reason: HOSPADM

## 2023-08-17 RX ORDER — AMLODIPINE BESYLATE 5 MG/1
5 TABLET ORAL DAILY
COMMUNITY
End: 2024-01-22

## 2023-08-17 RX ORDER — MUPIROCIN 20 MG/G
OINTMENT TOPICAL 2 TIMES DAILY
Status: COMPLETED | OUTPATIENT
Start: 2023-08-17 | End: 2023-08-18

## 2023-08-17 RX ORDER — OXYBUTYNIN CHLORIDE 10 MG/1
10 TABLET, EXTENDED RELEASE ORAL DAILY
Status: DISCONTINUED | OUTPATIENT
Start: 2023-08-18 | End: 2023-08-19 | Stop reason: HOSPADM

## 2023-08-17 RX ORDER — HYDRALAZINE HYDROCHLORIDE 50 MG/1
50 TABLET, FILM COATED ORAL EVERY 8 HOURS PRN
Status: DISCONTINUED | OUTPATIENT
Start: 2023-08-17 | End: 2023-08-19 | Stop reason: HOSPADM

## 2023-08-17 RX ORDER — IBUPROFEN 200 MG
24 TABLET ORAL
Status: DISCONTINUED | OUTPATIENT
Start: 2023-08-17 | End: 2023-08-19 | Stop reason: HOSPADM

## 2023-08-17 RX ORDER — SUMATRIPTAN 50 MG/1
100 TABLET, FILM COATED ORAL 2 TIMES DAILY PRN
Status: DISCONTINUED | OUTPATIENT
Start: 2023-08-17 | End: 2023-08-19 | Stop reason: HOSPADM

## 2023-08-17 RX ORDER — INSULIN ASPART 100 [IU]/ML
0-5 INJECTION, SOLUTION INTRAVENOUS; SUBCUTANEOUS
Status: DISCONTINUED | OUTPATIENT
Start: 2023-08-17 | End: 2023-08-19 | Stop reason: HOSPADM

## 2023-08-17 RX ORDER — ONDANSETRON 8 MG/1
8 TABLET, ORALLY DISINTEGRATING ORAL EVERY 12 HOURS PRN
Status: DISCONTINUED | OUTPATIENT
Start: 2023-08-17 | End: 2023-08-17

## 2023-08-17 RX ORDER — LEVOTHYROXINE SODIUM 50 UG/1
50 TABLET ORAL
Status: DISCONTINUED | OUTPATIENT
Start: 2023-08-18 | End: 2023-08-19 | Stop reason: HOSPADM

## 2023-08-17 RX ORDER — ANASTROZOLE 1 MG/1
1 TABLET ORAL DAILY
Status: DISCONTINUED | OUTPATIENT
Start: 2023-08-18 | End: 2023-08-19 | Stop reason: HOSPADM

## 2023-08-17 RX ORDER — ERGOCALCIFEROL 1.25 MG/1
50000 CAPSULE ORAL
Status: DISCONTINUED | OUTPATIENT
Start: 2023-08-18 | End: 2023-08-19 | Stop reason: HOSPADM

## 2023-08-17 RX ORDER — ISOSORBIDE MONONITRATE 30 MG/1
30 TABLET, EXTENDED RELEASE ORAL DAILY
Status: DISCONTINUED | OUTPATIENT
Start: 2023-08-18 | End: 2023-08-19 | Stop reason: HOSPADM

## 2023-08-17 RX ADMIN — APIXABAN 5 MG: 5 TABLET, FILM COATED ORAL at 09:08

## 2023-08-17 RX ADMIN — SENNOSIDES AND DOCUSATE SODIUM 1 TABLET: 50; 8.6 TABLET ORAL at 09:08

## 2023-08-17 RX ADMIN — CARVEDILOL 6.25 MG: 3.12 TABLET, FILM COATED ORAL at 09:08

## 2023-08-17 RX ADMIN — METHOCARBAMOL 1000 MG: 500 TABLET ORAL at 09:08

## 2023-08-17 RX ADMIN — SUMATRIPTAN SUCCINATE 100 MG: 50 TABLET ORAL at 09:08

## 2023-08-17 RX ADMIN — INSULIN DETEMIR 8 UNITS: 100 INJECTION, SOLUTION SUBCUTANEOUS at 09:08

## 2023-08-17 RX ADMIN — TRAZODONE HYDROCHLORIDE 100 MG: 100 TABLET ORAL at 09:08

## 2023-08-17 RX ADMIN — HYDRALAZINE HYDROCHLORIDE 50 MG: 50 TABLET ORAL at 08:08

## 2023-08-17 RX ADMIN — MUPIROCIN: 20 OINTMENT TOPICAL at 09:08

## 2023-08-17 RX ADMIN — SEVELAMER CARBONATE 800 MG: 800 TABLET, FILM COATED ORAL at 09:08

## 2023-08-17 RX ADMIN — HYDROCODONE BITARTRATE AND ACETAMINOPHEN 1 TABLET: 10; 325 TABLET ORAL at 09:08

## 2023-08-17 NOTE — ASSESSMENT & PLAN NOTE
Patient has recurrent depression which is moderate and is currently controlled. Will Continue anti-depressant medications. We will not consult psychiatry at this time. Patient does not display psychosis at this time. Continue to monitor closely and adjust plan of care as needed.  -Continue trazodone

## 2023-08-17 NOTE — ASSESSMENT & PLAN NOTE
"Patient's FSGs are controlled on current medication regimen.  Last A1c reviewed-   Lab Results   Component Value Date    HGBA1C 6.1 (H) 08/04/2023     Most recent fingerstick glucose reviewed- No results for input(s): "POCTGLUCOSE" in the last 24 hours.  Current correctional scale  Low  Maintain anti-hyperglycemic dose as follows-   Antihyperglycemics (From admission, onward)    Start     Stop Route Frequency Ordered    08/17/23 2100  insulin detemir U-100 (Levemir) pen 8 Units         -- SubQ Nightly 08/17/23 1813    08/17/23 1913  insulin aspart U-100 pen 0-5 Units         -- SubQ Before meals & nightly PRN 08/17/23 1813        Hold Oral hypoglycemics while patient is in the hospital.  -Accuchecks AC/HS  "

## 2023-08-17 NOTE — ED NOTES
Patient identifiers verified and correct for Cecile Jessi   LOC: The patient is awake, alert and aware of environment with an appropriate affect, the patient is oriented x 3 and speaking appropriately.   APPEARANCE: Patient appears comfortable and in no acute distress, patient is clean and well groomed.  SKIN: The skin is warm and dry, color consistent with ethnicity, patient has normal skin turgor and moist mucus membranes, skin intact  MUSCULOSKELETAL: Patient moving all extremities spontaneously, no swelling noted. Pt c/o chronic back pain  RESPIRATORY: Airway is open and patent, respirations are spontaneous, patient has a normal effort and rate, no accessory muscle use noted, O2 sats noted at 100% on room air.  CARDIAC: Patient has a normal rate, no edema noted, capillary refill < 3 seconds.   GASTRO: Soft and non tender to palpation, no distention noted, normoactive bowel sounds present in all four quadrants. Pt states bowel movements have been regular.  : Pt denies any pain or frequency with urination. Pt has suprapubic cath  NEURO: Pt opens eyes spontaneously, behavior appropriate to situation, follows commands, facial expression symmetrical, bilateral hand grasp equal and even, purposeful motor response noted, normal sensation in all extremities when touched with a finger.

## 2023-08-17 NOTE — H&P
[facility] - Emergency Dept  [facility]  History & Physical    Patient Name: Cecile Bowen  MRN: [id number]  Patient Class: OP- Observation  Admission Date: 8/17/2023  Attending Physician: Morris Stephens MD   Primary Care Provider: Lurdes Navarro MD         Patient information was obtained from patient, past medical records, and ER records.     Subjective:     Principal Problem:Volume overload    Chief Complaint:   Chief Complaint   Patient presents with    Missed Dialysis     Has missed dialysis since last Wednesday due to a lack of a ride        HPI: Cecile Bowen is a  73 y.o. female with a PMHx of HTN, HLD, hypothyroidism, a fib on eliquis, DM2, obesity, VIJAYA, chronic pain, neurogenic bladder with suprapubic catheter, and CKD4 on HD who presents to the ED for missed HD sessions. Of note the patient [place] [facility] [place] for acute on chronic CKD and was reinitiated on HD due to worsening kidney function and metabolic acidosis. The patient was last dialyzed 8/9. She states she was scheduled to start HD on Monday but missed HD on Monday and Wednesday due to transportation issues. The patient endorses BLE edema that began about 5 months ago and is relatively unchanged. She denies shortness of breath or orthopnea. The patient does make urine. She notes a mild intermittent cough with yellow phlegm. The patient denies chest pain, fever, N/V/D, abdominal pain, or hematuria. She endorses chronic issues with migraines, constipation, and back pain which are at baseline. The patient is wheelchair bound at baseline and uses a christina lift to transfer.    In the ED, patient hypertensive but otherwise vitals stable, afebrile. CBC with stable anemia. Na 128 (bl ~130). Bicarb 22. Cr 3.0 (bl~2.2). Glucose 114. Calcium 8.3. Lactate 0.6. . pH 7.26. EKG shows SR, 74 bpm, no acute ischemic changes. CXR in process. Nephrology was consulted in the ED for HD.    Past Medical History:   Diagnosis Date     Cervicogenic migraine     Chronic pain     CKD (chronic kidney disease) stage 4, GFR 15-29 ml/min     [facility]    CKD (chronic kidney disease) stage 4, GFR 15-29 ml/min     Diabetes mellitus     Long term use of Insulin, Diabetic Neuropathy    Dialysis patient 1/21/2022    Fibromyalgia     Hydronephrosis     Hyperlipidemia     Hypertension 12/12/2012    Hypothyroidism 12/12/2012    ARON (iron deficiency anemia)     Insomnia     Levoscoliosis     Malignant neoplasm of upper-outer quadrant of left breast in female, estrogen receptor positive     Metabolic acidosis     Mobility impaired     Nuclear sclerosis - Both Eyes 3/24/2014    VIJAYA (obstructive sleep apnea)     Osteopenia     Pulmonary nodule     Recurrent UTI     Renal manifestation of secondary diabetes mellitus     Secondary hyperparathyroidism, renal     Urinary retention        Past Surgical History:   Procedure Laterality Date    BIOPSY OF URETER Left 2/18/2022    Procedure: BIOPSY, URETER;  Surgeon: Ronel Whittington MD;  Location: [facility] OR [address] [place] [address];  Service: Urology;  Laterality: Left;    BREAST BIOPSY Right     benign    CHOLECYSTECTOMY      COLONOSCOPY N/A 1/13/2017    Procedure: COLONOSCOPY;  Surgeon: Morris Wiseman MD;  [facility]  Service: Endoscopy;  Laterality: N/A;  Renal pt Nephrology advised to avoid phosphate preps    CYSTOSCOPY N/A 10/8/2018    Procedure: CYSTOSCOPY;  Surgeon: Ronel Whittington MD;  Location: [address];  Service: Urology;  Laterality: N/A;  45 min    CYSTOSCOPY N/A 3/25/2019    Procedure: CYSTOSCOPY;  Surgeon: Ronel Whittington MD;  Location: [address];  Service: Urology;  Laterality: N/A;  45 min    CYSTOSCOPY N/A 8/26/2019    Procedure: CYSTOSCOPY;  Surgeon: Ronel Whittington MD;  Location: [address];  Service: Urology;  Laterality: N/A;  45 min    CYSTOSCOPY N/A 7/2/2021    Procedure: CYSTOSCOPY;  Surgeon: Ronel Whittington MD;  Location: [address]  [facility];  Service: Urology;  Laterality: N/A;    CYSTOSCOPY  12/23/2021    Procedure: CYSTOSCOPY;  Surgeon: Ronel Whittington MD;  [facility];  Service: Urology;;    CYSTOSCOPY  2/18/2022    Procedure: CYSTOSCOPY;  Surgeon: Ronel Whittington MD;  [facility];  Service: Urology;;    CYSTOSCOPY W/ URETERAL STENT PLACEMENT Left 5/15/2021    Procedure: CYSTOSCOPY, WITH URETERAL STENT INSERTION;  Surgeon: Levy Sánchez Jr., MD;  [facility];  Service: Urology;  Laterality: Left;    CYSTOSCOPY WITH BIOPSY OF BLADDER N/A 1/27/2020    Procedure: CYSTOSCOPY, WITH BLADDER BIOPSY, WITH FULGURATION IF INDICATED;  Surgeon: Ronel Whittington MD;  Location: [facility] OR [address] [place] [address];  Service: Urology;  Laterality: N/A;    DILATION AND CURETTAGE OF UTERUS      GALLBLADDER SURGERY  2006    HYSTERECTOMY      INJECTION FOR SENTINEL NODE IDENTIFICATION Left 8/1/2019    Procedure: INJECTION, FOR SENTINEL NODE IDENTIFICATION;  Surgeon: Samia Fulton MD;  Location: [facility] OR [address] [place] [address];  Service: General;  Laterality: Left;    INJECTION OF BOTULINUM TOXIN TYPE A N/A 10/8/2018    Procedure: INJECTION, BOTULINUM TOXIN, TYPE A 300 UNITS;  Surgeon: Ronel Whittington MD;  Location: [facility] OR [address] [place] [address];  Service: Urology;  Laterality: N/A;    INJECTION OF BOTULINUM TOXIN TYPE A N/A 3/25/2019    Procedure: INJECTION, BOTULINUM TOXIN, TYPE A 300 UNITS;  Surgeon: Ronel Whittington MD;  Location: [facility] OR [address] [place] [address];  Service: Urology;  Laterality: N/A;    INJECTION OF BOTULINUM TOXIN TYPE A N/A 8/26/2019    Procedure: INJECTION, BOTULINUM TOXIN, TYPE A 300 UNITS;  Surgeon: Ronel Whittington MD;  Location: [facility] OR [address] [place] [address];  Service: Urology;  Laterality: N/A;    MASTECTOMY Left 8/1/2019    Procedure: MASTECTOMY 23 hour stay;  Surgeon: Samia Fulton MD;  Location: [facility] OR 2ND FLR;  Service: General;  Laterality: Left;    MEDIPORT REMOVAL Right 6/24/2022    Procedure: REMOVAL, CATHETER, CENTRAL  "VENOUS, TUNNELED, WITH PORT;  Surgeon: Isauro Anderson MD;  Location: [facility] CATH LAB;  Service: Radiology;  Laterality: Right;    OVARIAN CYST SURGERY  1985    REPLACEMENT OF STENT Left 12/23/2021    Procedure: REPLACEMENT, STENT;  Surgeon: Ronel Whittington MD;  Location: [facility] OR [facility];  Service: Urology;  Laterality: Left;    REPLACEMENT OF STENT Left 2/18/2022    Procedure: REPLACEMENT, STENT;  Surgeon: Ronel Whittington MD;  Location: [facility] OR 1ST FLR;  Service: Urology;  Laterality: Left;    RETROGRADE PYELOGRAPHY Left 2/18/2022    Procedure: PYELOGRAM, RETROGRADE;  Surgeon: Ronel Whittington MD;  [facility];  Service: Urology;  Laterality: Left;    SENTINEL LYMPH NODE BIOPSY Left 8/1/2019    Procedure: BIOPSY, LYMPH NODE, SENTINEL;  Surgeon: Samia Fulton MD;  [facility];  Service: General;  Laterality: Left;    spt placement      TONSILLECTOMY, ADENOIDECTOMY      TOTAL ABDOMINAL HYSTERECTOMY W/ BILATERAL SALPINGOOPHORECTOMY  1985    URETEROSCOPY Left 2/18/2022    Procedure: URETEROSCOPY;  Surgeon: Ronel Whittington MD;  Location: [facility] OR [address] [place] [address];  Service: Urology;  Laterality: Left;       Review of patient's allergies indicates:  No Known Allergies    No current facility-administered medications on file prior to encounter.     Current Outpatient Medications on File Prior to Encounter   Medication Sig    anastrozole (ARIMIDEX) 1 mg Tab Take 1 tablet (1 mg total) by mouth once daily.    apixaban (ELIQUIS) 5 mg Tab Take 1 tablet (5 mg total) by mouth 2 (two) times daily.    atorvastatin (LIPITOR) 80 MG tablet Take 1 tablet (80 mg total) by mouth once daily.    BD INSULIN SYRINGE ULTRA-FINE 0.5 mL 31 gauge x 5/16" Syrg USE WITH INSULIN 4 TIMES A DAY    calcium acetate,phosphat bind, (PHOSLO) 667 mg tablet Take 1 tablet (667 mg total) by mouth 3 (three) times daily with meals.    carvediloL (COREG) 6.25 MG tablet Take 1 tablet (6.25 mg total) by mouth 2 (two) " "times daily.    erenumab-aooe (AIMOVIG) 140 mg/mL autoinjector Inject 1 mL (140 mg total) into the skin every 30 days.    ergocalciferol (ERGOCALCIFEROL) 50,000 unit Cap TAKE ONE CAPSULE BY MOUTH ONE TIME PER WEEK, TAKES ON TUESDAYS    furosemide (LASIX) 40 MG tablet Take 1 tablet (40 mg total) by mouth once daily.    HYDROcodone-acetaminophen (NORCO)  mg per tablet Take 1 tablet by mouth every 6 (six) hours as needed for Pain.    insulin (LANTUS SOLOSTAR U-100 INSULIN) glargine 100 units/mL SubQ pen INJECT 14-15 UNITS UNDER THE SKIN ONCE DAILY    isosorbide mononitrate (IMDUR) 30 MG 24 hr tablet Take 1 tablet (30 mg total) by mouth once daily.    magnesium oxide (MAG-OX) 400 mg (241.3 mg magnesium) tablet Take 1 tablet (400 mg total) by mouth once daily.    methocarbamoL (ROBAXIN) 750 MG Tab TAKE 1-2 TABLETS (750-1,500 MG TOTAL) BY MOUTH 3 (THREE) TIMES DAILY AS NEEDED (MUSCLE SPASMS).    NIFEdipine (PROCARDIA-XL) 90 MG (OSM) 24 hr tablet Take 1 tablet (90 mg total) by mouth once daily.    ondansetron (ZOFRAN-ODT) 8 MG TbDL TAKE 1 TABLET BY MOUTH EVERY 12 HOURS AS NEEDED    oxybutynin (DITROPAN-XL) 10 MG 24 hr tablet Take 1 tablet (10 mg total) by mouth once daily.    pen needle, diabetic (BD ULTRA-FINE SHORT PEN NEEDLE) 31 gauge x 5/16" Ndle USE WITH LANTUS DAILY, e 11.65    sodium bicarbonate 650 MG tablet Take 1 tablet (650 mg total) by mouth 3 (three) times daily.    sumatriptan (IMITREX) 100 MG tablet Take 1 tablet (100 mg total) by mouth 2 (two) times daily as needed for Migraine.    SYNTHROID 50 mcg tablet TAKE 1 TABLET BY MOUTH BEFORE BREAKFAST. ADMINISTER ON AN EMPTY STOMACH AT LEAST 30 MINUTES BEFORE FOOD. IF RECEIVING TUBE FEEDS, HOLD TUBE FEEDS FOR 1 HOUR BEFORE AND AFTER LEVOTHYROXINE ADMINISTRATION.    traZODone (DESYREL) 100 MG tablet TAKE 1 TABLET BY MOUTH NIGHTLY AS NEEDED FOR INSOMNIA.     Family History       Problem Relation (Age of Onset)    ALS Mother    Cancer Maternal Grandmother, " Paternal Grandfather, Brother    Diabetes Sister, Maternal Aunt, Maternal Uncle    Kidney disease Sister, Maternal Aunt    Prostate cancer Brother    Scoliosis Brother          Tobacco Use    Smoking status: Never    Smokeless tobacco: Never   Substance and Sexual Activity    Alcohol use: No     Alcohol/week: 0.0 standard drinks of alcohol    Drug use: No    Sexual activity: Not Currently     Birth control/protection: Abstinence     Review of Systems   Constitutional:  Negative for appetite change, chills, diaphoresis, fatigue and fever.   HENT:  Negative for congestion and rhinorrhea.    Eyes:  Negative for photophobia and visual disturbance.   Respiratory:  Positive for cough (intermittent). Negative for shortness of breath and wheezing.    Cardiovascular:  Positive for leg swelling (x 5 months). Negative for chest pain and palpitations.   Gastrointestinal:  Positive for constipation (chronic). Negative for abdominal distention, abdominal pain, diarrhea, nausea and vomiting.   Genitourinary:  Positive for decreased urine volume (On HD). Negative for flank pain and hematuria.   Musculoskeletal:  Positive for back pain (chronic) and gait problem (chronic issue). Negative for neck pain.   Skin:  Negative for color change, pallor, rash and wound.   Neurological:  Positive for headaches (chronic, intermittent). Negative for dizziness, syncope, weakness and light-headedness.   Psychiatric/Behavioral:  Negative for confusion and hallucinations. The patient is not nervous/anxious.      Objective:     Vital Signs (Most Recent):  Temp: 98.4 °F (36.9 °C) (08/17/23 1410)  Pulse: 79 (08/17/23 1732)  Resp: 20 (08/17/23 1734)  BP: (!) 205/94 (08/17/23 1732)  SpO2: 100 % (08/17/23 1732) Vital Signs (24h Range):  Temp:  [98.4 °F (36.9 °C)] 98.4 °F (36.9 °C)  Pulse:  [66-98] 79  Resp:  [10-23] 20  SpO2:  [98 %-100 %] 100 %  BP: (149-219)/(65-97) 205/94        There is no height or weight on file to calculate BMI.     Physical  Exam  Vitals and nursing note reviewed.   Constitutional:       General: She is not in acute distress.     Appearance: She is obese. She is not toxic-appearing or diaphoretic.   HENT:      Head: Normocephalic and atraumatic.      Nose: Nose normal.      Mouth/Throat:      Mouth: Mucous membranes are moist.   Eyes:      Pupils: Pupils are equal, round, and reactive to light.   Cardiovascular:      Rate and Rhythm: Normal rate and regular rhythm.      Pulses: Normal pulses.      Heart sounds: No murmur heard.  Pulmonary:      Effort: Pulmonary effort is normal. No respiratory distress.      Breath sounds: No wheezing, rhonchi or rales.      Comments: Currently on room air  Chest:      Comments: R chest wall tunneled catheter; no surrounding erythema, drainage, or swelling  Abdominal:      General: Bowel sounds are normal. There is no distension.      Palpations: Abdomen is soft.      Tenderness: There is no abdominal tenderness. There is no guarding.      Comments: Suprapubic catheter in place draining hazy, sediment filled yellow urine   Musculoskeletal:         General: No tenderness or deformity. Normal range of motion.      Cervical back: Normal range of motion.      Right lower leg: Edema present.      Left lower leg: Edema present.   Skin:     General: Skin is warm and dry.      Capillary Refill: Capillary refill takes less than 2 seconds.      Comments: Dry flaky skin noted to BLE   Neurological:      General: No focal deficit present.      Mental Status: She is alert and oriented to person, place, and time.      Sensory: No sensory deficit.      Motor: Weakness (BLE) present.   Psychiatric:         Mood and Affect: Mood normal.         Behavior: Behavior normal.              CRANIAL NERVES     CN III, IV, VI   Pupils are equal, round, and reactive to light.       Significant Labs: All pertinent labs within the past 24 hours have been reviewed.  CBC:   Recent Labs   Lab 08/17/23  1528   WBC 9.92   HGB 8.7*    HCT 28.8*        CMP:   Recent Labs   Lab 08/17/23  1528   *   K 4.7   CL 96   CO2 22*   *   BUN 44*   CREATININE 3.0*   CALCIUM 8.3*   PROT 6.1   ALBUMIN 2.7*   BILITOT 0.3   ALKPHOS 120   AST 16   ALT 11   ANIONGAP 10     Cardiac Markers:   Recent Labs   Lab 08/17/23  1528   *     Lactic Acid:   Recent Labs   Lab 08/17/23  1528   LACTATE 0.6       Significant Imaging: I have reviewed all pertinent imaging results/findings within the past 24 hours.  Imaging Results              X-Ray Chest AP Portable (In process)                     Assessment/Plan:     * Volume overload  Acute kidney injury superimposed on CKD  Cr 3.0 on admit, previous Cr bl ~2.2  -Reinitiated on HD during last admit 8/6-8/11 for acute on chronic kidney injury  -Last dialyzed 8/9, scheduled MWF but missed last 2 sessions due to transportation issues  -Endorses BLE edema;  and CXR in process. Currently on room air.  -Nephrology consulted in the ED, appreciate assistance.  -Continue Chronic hemodialysis.   -Monitor daily electrolytes and defer dialysis orders to nephrology.  -Renal diet, 1.5L FR  -Daily wt/ strict I&Os  -Renally dose medications  -Continue phosphorus binders    Anemia of chronic disease  Patient's anemia is currently controlled. S/p 0 units of PRBCs. Etiology likely d/t CKD.  Current CBC reviewed-   Lab Results   Component Value Date    HGB 8.7 (L) 08/17/2023    HCT 28.8 (L) 08/17/2023     Monitor serial CBC and transfuse if patient becomes hemodynamically unstable, symptomatic or H/H drops below 7/21.     Chronic kidney disease-mineral and bone disorder  -continue vit d    Hypertension associated with diabetes  -Chronic, hypertensive in the ED likely due to volume overload.  -Continue coreg, nifedipine, imdur, and lasix.  -PRN hydralazine for SBP >180  -Continue to monitor BP closely.    A-fib  Patient with Paroxysmal (<7 days) atrial fibrillation which is controlled currently with Beta  "Blocker. Patient is currently in sinus rhythm.[id number] Score: 3. Anticoagulation indicated. Anticoagulation done with eliquis.    Type 2 diabetes mellitus with stage 4 chronic kidney disease, with long-term current use of insulin  Patient's FSGs are controlled on current medication regimen.  Last A1c reviewed-   Lab Results   Component Value Date    HGBA1C 6.1 (H) 08/04/2023     Most recent fingerstick glucose reviewed- No results for input(s): "POCTGLUCOSE" in the last 24 hours.  Current correctional scale  Low  Maintain anti-hyperglycemic dose as follows-   Antihyperglycemics (From admission, onward)      Start     Stop Route Frequency Ordered    08/17/23 2100  insulin detemir U-100 (Levemir) pen 8 Units         -- SubQ Nightly 08/17/23 1813 08/17/23 1913  insulin aspart U-100 pen 0-5 Units         -- SubQ Before meals & nightly PRN 08/17/23 1813          Hold Oral hypoglycemics while patient is in the hospital.  -Accuchecks AC/HS    Cervicogenic migraine  -Chronic issue.  -Continue robaxin, careful use of PRN sumatriptan as patient has HTN as well.    Chronic pain  Fibromyalgia  -Chronic, controlled.  - reviewed, continue home norco and robaxin.    Morbid obesity due to excess calories  Body mass index is 42.04 kg/m². Morbid besity complicates all aspects of disease management from diagnostic modalities to treatment.     Major depressive disorder, recurrent episode, moderate  Patient has recurrent depression which is moderate and is currently controlled. Will Continue anti-depressant medications. We will not consult psychiatry at this time. Patient does not display psychosis at this time. Continue to monitor closely and adjust plan of care as needed.  -Continue trazodone    Neurogenic bladder  Suprapubic catheter  -Chronic issue.  -Suprapubic catheter last changed ~3 weeks ago per patient  -Care per nursing.    Hyponatremia  -Chronic hyponatremia 130 baseline, 128 on admission  -Likely worsened secondary " to fluid overload  -Nephro consulted for HD  The patient's sodium results have been reviewed and are listed below.  Recent Labs   Lab 08/17/23  1528   *       VIJAYA (obstructive sleep apnea)  -Chronic issue.  -CPAP qhs.    Hyperlipidemia associated with type 2 diabetes mellitus   Patient is chronically on statin.will continue for now. Monitor clinically. Last LDL was   Lab Results   Component Value Date    LDLCALC 127.4 01/31/2023       Hypothyroidism  Patient has chronic hypothyroidism. TFTs reviewed-   Lab Results   Component Value Date    TSH 0.571 08/06/2023   . Will continue chronic levothyroxine and adjust for and clinical changes.      VTE Risk Mitigation (From admission, onward)           Ordered     apixaban tablet 5 mg  2 times daily         08/17/23 1813     IP VTE HIGH RISK PATIENT  Once         08/17/23 1813     Place sequential compression device  Until discontinued         08/17/23 1813     Reason for No Pharmacological VTE Prophylaxis  Once        Question:  Reasons:  Answer:  Already adequately anticoagulated on oral Anticoagulants    08/17/23 1813                         On 08/17/2023, patient should be placed in hospital observation services under my care in collaboration with Reece Newby MD.      Jocelyne Acevedo NP  Department of Hospital Medicine  [facility] - Emergency Dept

## 2023-08-17 NOTE — PLAN OF CARE
SW spoke with pt about challenges with dialysis and transportation.  As per pt she was scheduled for her first dialysis at [facility] on Monday 08/14.  Pt had transportation arranged for her through her insurance company and when transport came they had the wrong size van and pt could not go to dialysis.  Pt next appointment was scheduled for Wednesday 08/16, and as per pt the transport van never showed up to bring her to dialysis.     SW attempted to reach out to the dialysis center via secure chat and calling the center at [phone].  SW tried numerous options - opt 1, opt 2, opt 3, opt 5 - and received no answer.     SW to continue to follow-up        Ghada Rebolledo CD, MSW, LMSW, RSW   Case Management  [facility]  Email: [email or web address]

## 2023-08-17 NOTE — PLAN OF CARE
SW spoke with pt regarding challenges with new dialysis chair this week.  As per pt she was ready to go on Monday for her dialysis and transport came with the wrong size vehicle.  Pt stated that on Wednesday when she had scheduled dialysis transport never showed up for her.      SW briefly discussed the importance of dialysis and pt stated she would rather go to dialysis than keep coming to [facility].  Pt understands the need for dialysis and does not want to lose her chair time at [facility].      CHRIS has began chat with SW listed in pt chart and will continue to attempt contact with them to ensure pt will have her chair at [facility].        Ghada Rebolledo, CONSTANZA, MSW, LMSW, RSW   Case Management  [facility]  Email: [email or web address]

## 2023-08-17 NOTE — ASSESSMENT & PLAN NOTE
Patient's anemia is currently controlled. S/p 0 units of PRBCs. Etiology likely d/t CKD.  Current CBC reviewed-   Lab Results   Component Value Date    HGB 8.7 (L) 08/17/2023    HCT 28.8 (L) 08/17/2023     Monitor serial CBC and transfuse if patient becomes hemodynamically unstable, symptomatic or H/H drops below 7/21.

## 2023-08-17 NOTE — ASSESSMENT & PLAN NOTE
Patient has chronic hypothyroidism. TFTs reviewed-   Lab Results   Component Value Date    TSH 0.571 08/06/2023   . Will continue chronic levothyroxine and adjust for and clinical changes.

## 2023-08-17 NOTE — ASSESSMENT & PLAN NOTE
Suprapubic catheter  -Chronic issue.  -Suprapubic catheter last changed ~3 weeks ago per patient  -Care per nursing.

## 2023-08-17 NOTE — PROVIDER PROGRESS NOTES - EMERGENCY DEPT.
Encounter Date: 8/17/2023    ED Physician Progress Notes         EKG - STEMI Decision  Initial Reading: No STEMI present.  Response: No Action Needed.    QT normal no U waves

## 2023-08-17 NOTE — ASSESSMENT & PLAN NOTE
Patient with Paroxysmal (<7 days) atrial fibrillation which is controlled currently with Beta Blocker. Patient is currently in sinus rhythm.[id number] Score: 3. Anticoagulation indicated. Anticoagulation done with eliquis.

## 2023-08-17 NOTE — HPI
Cecile Bowen is a  73 y.o. female with a PMHx of HTN, HLD, hypothyroidism, a fib on eliquis, DM2, obesity, VIJAYA, chronic pain, neurogenic bladder with suprapubic catheter, and CKD4 on HD who presents to the ED for missed HD sessions. Of note the patient was recently [place] [facility] [place] for acute on chronic CKD and was reinitiated on HD due to worsening kidney function and metabolic acidosis. The patient was last dialyzed 8/9. She states she was scheduled to start HD on Monday but missed HD on Monday and Wednesday due to transportation issues. The patient endorses BLE edema that began about 5 months ago and is relatively unchanged. She denies shortness of breath or orthopnea. The patient does make urine. She notes a mild intermittent cough with yellow phlegm. The patient denies chest pain, fever, N/V/D, abdominal pain, or hematuria. She endorses chronic issues with migraines, constipation, and back pain which are at baseline. The patient is wheelchair bound at baseline and uses a christina lift to transfer.    In the ED, patient hypertensive but otherwise vitals stable, afebrile. CBC with stable anemia. Na 128 (bl ~130). Bicarb 22. Cr 3.0 (bl~2.2). Glucose 114. Calcium 8.3. Lactate 0.6. . pH 7.26. EKG shows SR, 74 bpm, no acute ischemic changes. CXR in process. Nephrology was consulted in the ED for HD.

## 2023-08-17 NOTE — ED TRIAGE NOTES
Pt arriving to ED c/o Has missed dialysis since last Wednesday due to a lack of a ride.  called and told to come get blood work

## 2023-08-17 NOTE — ED PROVIDER NOTES
"Encounter Date: 8/17/2023       History     Chief Complaint   Patient presents with    Missed Dialysis     Has missed dialysis since last Wednesday due to a lack of a ride     HPI  Cecile Bowen is a 74 y/o F with a pmhx of T2DM on insulin, Afib on Eliquis, neurogenic bladder with suprapubic catheter, CKD4 on dialysis. Patient is presenting today for missed dialysis session. Patient usually dialyzes on MWF, patient missed both Monday and Wednesday of this week because of transport issues. Last dialysis reported to be on 8/9. Patient had a recent hospital admission for acute on chronic renal failure where dialysis was restarted after a 6 month hiatus for returning kidney function. Patient denies any SOB, chest pain, dizziness or any pain. Patient notes significant swelling in her lower extremities.    Per chart review, she was diuresed with Lasix 80 IV and given sodium bicarb gtt, which did help improve her bicarb and pH.  However, Nephrology felt her kidney function warranted resuming HD.  Volume overloaded on admission and placed on fluid restriction. Initiated Carvedilol, titrated up as needed for optimal BP control. Discontinued home clonidine which was being used as "PRN". Discontinued bicarb gtt. Received tunnelled cath today for HD and had 1st round of dialysis 08/08. Hgb AM fell as low as 6.7, transfused with 1u pRBC. Discontinued amlodipine and started nifedipine for more potent antihypertensive effect. HD Chair secured at [facility] for MWF schedule starting 08/11/23.    Review of patient's allergies indicates:  No Known Allergies  Past Medical History:   Diagnosis Date    Cervicogenic migraine     Chronic pain     CKD (chronic kidney disease) stage 4, GFR 15-29 ml/min     [facility]    CKD (chronic kidney disease) stage 4, GFR 15-29 ml/min     Diabetes mellitus     Long term use of Insulin, Diabetic Neuropathy    Dialysis patient 1/21/2022    Fibromyalgia     Hydronephrosis     Hyperlipidemia     Hypertension " 12/12/2012    Hypothyroidism 12/12/2012    ARON (iron deficiency anemia)     Insomnia     Levoscoliosis     Malignant neoplasm of upper-outer quadrant of left breast in female, estrogen receptor positive     Metabolic acidosis     Mobility impaired     Nuclear sclerosis - Both Eyes 3/24/2014    VIJAYA (obstructive sleep apnea)     Osteopenia     Pulmonary nodule     Recurrent UTI     Renal manifestation of secondary diabetes mellitus     Secondary hyperparathyroidism, renal     Urinary retention      Past Surgical History:   Procedure Laterality Date    BIOPSY OF URETER Left 2/18/2022    Procedure: BIOPSY, URETER;  Surgeon: Ronel Whittington MD;  Location: [facility] OR 1ST FLR;  Service: Urology;  Laterality: Left;    BREAST BIOPSY Right     benign    CHOLECYSTECTOMY      COLONOSCOPY N/A 1/13/2017    Procedure: COLONOSCOPY;  Surgeon: Morris Wiseman MD;  Location: [facility] ENDO (4TH FLR);  Service: Endoscopy;  Laterality: N/A;  Renal pt Nephrology advised to avoid phosphate preps    CYSTOSCOPY N/A 10/8/2018    Procedure: CYSTOSCOPY;  Surgeon: Ronel Whittington MD;  [facility];  Service: Urology;  Laterality: N/A;  45 min    CYSTOSCOPY N/A 3/25/2019    Procedure: CYSTOSCOPY;  Surgeon: Ronel Whittington MD;  [facility];  Service: Urology;  Laterality: N/A;  45 min    CYSTOSCOPY N/A 8/26/2019    Procedure: CYSTOSCOPY;  Surgeon: Ronel Whittington MD;  [facility];  Service: Urology;  Laterality: N/A;  45 min    CYSTOSCOPY N/A 7/2/2021    Procedure: CYSTOSCOPY;  Surgeon: Ronel Whittington MD;  [facility];  Service: Urology;  Laterality: N/A;    CYSTOSCOPY  12/23/2021    Procedure: CYSTOSCOPY;  Surgeon: Ronel Whittington MD;  [facility]: [facility];  Service: Urology;;    CYSTOSCOPY  2/18/2022    Procedure: CYSTOSCOPY;  Surgeon: Ronel Whittington MD;  [facility];  Service: Urology;;    CYSTOSCOPY W/ URETERAL STENT  PLACEMENT Left 5/15/2021    Procedure: CYSTOSCOPY, WITH URETERAL STENT INSERTION;  Surgeon: Levy Sánchez Jr., MD;  Location: [facility] OR [address] [place] [address];  Service: Urology;  Laterality: Left;    CYSTOSCOPY WITH BIOPSY OF BLADDER N/A 1/27/2020    Procedure: CYSTOSCOPY, WITH BLADDER BIOPSY, WITH FULGURATION IF INDICATED;  Surgeon: Ronel Whittington MD;  Location: [facility] OR [address] [place] [address];  Service: Urology;  Laterality: N/A;    DILATION AND CURETTAGE OF UTERUS      GALLBLADDER SURGERY  2006    HYSTERECTOMY      INJECTION FOR SENTINEL NODE IDENTIFICATION Left 8/1/2019    Procedure: INJECTION, FOR SENTINEL NODE IDENTIFICATION;  Surgeon: Samia Fulton MD;  Location: [facility] OR [address] [place] [address];  Service: General;  Laterality: Left;    INJECTION OF BOTULINUM TOXIN TYPE A N/A 10/8/2018    Procedure: INJECTION, BOTULINUM TOXIN, TYPE A 300 UNITS;  Surgeon: Ronel Whittington MD;  Location: [facility] OR [address] [place] [address];  Service: Urology;  Laterality: N/A;    INJECTION OF BOTULINUM TOXIN TYPE A N/A 3/25/2019    Procedure: INJECTION, BOTULINUM TOXIN, TYPE A 300 UNITS;  Surgeon: Ronel Whittington MD;  Location: [facility] OR [address] [place] [address];  Service: Urology;  Laterality: N/A;    INJECTION OF BOTULINUM TOXIN TYPE A N/A 8/26/2019    Procedure: INJECTION, BOTULINUM TOXIN, TYPE A 300 UNITS;  Surgeon: Ronel Whittington MD;  Location: [facility] OR [address] [place] [address];  Service: Urology;  Laterality: N/A;    MASTECTOMY Left 8/1/2019    Procedure: MASTECTOMY 23 hour stay;  Surgeon: Samia Fulton MD;  Location: [facility] OR [address] [place] [address];  Service: General;  Laterality: Left;    MEDIPORT REMOVAL Right 6/24/2022    Procedure: REMOVAL, CATHETER, CENTRAL VENOUS, TUNNELED, WITH PORT;  Surgeon: Isauro Anderson MD;  Location: [facility] CATH LAB;  Service: Radiology;  Laterality: Right;    OVARIAN CYST SURGERY  1985    REPLACEMENT OF STENT Left 12/23/2021    Procedure: REPLACEMENT, STENT;  Surgeon: Ronel Whittington MD;  Location: [facility] OR [address] [place] [address];  Service: Urology;  Laterality: Left;    REPLACEMENT  OF STENT Left 2/18/2022    Procedure: REPLACEMENT, STENT;  Surgeon: Ronel Whittington MD;  Location: [facility] OR 1ST FLR;  Service: Urology;  Laterality: Left;    RETROGRADE PYELOGRAPHY Left 2/18/2022    Procedure: PYELOGRAM, RETROGRADE;  Surgeon: Ronel Whittington MD;  Location: [facility] OR 1ST FLR;  Service: Urology;  Laterality: Left;    SENTINEL LYMPH NODE BIOPSY Left 8/1/2019    Procedure: BIOPSY, LYMPH NODE, SENTINEL;  Surgeon: Samia Fulton MD;  Location: [facility] OR 2ND FLR;  Service: General;  Laterality: Left;    spt placement      TONSILLECTOMY, ADENOIDECTOMY      TOTAL ABDOMINAL HYSTERECTOMY W/ BILATERAL SALPINGOOPHORECTOMY  1985    URETEROSCOPY Left 2/18/2022    Procedure: URETEROSCOPY;  Surgeon: Ronel Whittington MD;  Location: [facility] OR [address] [place] [address];  Service: Urology;  Laterality: Left;     Family History   Problem Relation Age of Onset    Diabetes Sister     Kidney disease Sister         CKD III    ALS Mother         d.    Cancer Maternal Grandmother         d. colon    Cancer Paternal Grandfather         d. lung    Scoliosis Brother         increased pain    Prostate cancer Brother         cured s/p surgery    Cancer Brother         rare cancer that got into the bones    Diabetes Maternal Aunt     Kidney disease Maternal Aunt     Diabetes Maternal Uncle     Amblyopia Neg Hx     Blindness Neg Hx     Cataracts Neg Hx     Glaucoma Neg Hx     Macular degeneration Neg Hx     Retinal detachment Neg Hx     Strabismus Neg Hx      Social History     Tobacco Use    Smoking status: Never    Smokeless tobacco: Never   Substance Use Topics    Alcohol use: No     Alcohol/week: 0.0 standard drinks of alcohol    Drug use: No     Review of Systems    Physical Exam     Initial Vitals [08/17/23 1410]   BP Pulse Resp Temp SpO2   (!) 179/92 98 16 98.4 °F (36.9 °C) 100 %      MAP       --         Physical Exam    Nursing note and vitals reviewed.  Constitutional: She appears well-developed and well-nourished.   Eyes:  Conjunctivae are normal.   Neck:   Normal range of motion.  Cardiovascular:  Normal rate and regular rhythm.           Pulmonary/Chest: Breath sounds normal. No respiratory distress.   Musculoskeletal:         General: Edema (bilateral lower legs) present.      Cervical back: Normal range of motion.     Neurological: She is alert and oriented to person, place, and time.   Skin: Skin is warm and dry.   Psychiatric: She has a normal mood and affect.         ED Course   Procedures  Labs Reviewed   CBC W/ AUTO DIFFERENTIAL - Abnormal; Notable for the following components:       Result Value    RBC 3.25 (*)     Hemoglobin 8.7 (*)     Hematocrit 28.8 (*)     MCH 26.8 (*)     MCHC 30.2 (*)     Immature Granulocytes 0.6 (*)     Immature Grans (Abs) 0.06 (*)     All other components within normal limits   COMPREHENSIVE METABOLIC PANEL - Abnormal; Notable for the following components:    Sodium 128 (*)     CO2 22 (*)     Glucose 114 (*)     BUN 44 (*)     Creatinine 3.0 (*)     Calcium 8.3 (*)     Albumin 2.7 (*)     eGFR 16.1 (*)     All other components within normal limits   B-TYPE NATRIURETIC PEPTIDE - Abnormal; Notable for the following components:     (*)     All other components within normal limits    Narrative:     ADD-ON PER CHEMA REIS RN; TOYA GARCIA MD;[id number]    08/17/2023  16:38    ISTAT PROCEDURE - Abnormal; Notable for the following components:    POC PH 7.260 (*)     POC PCO2 55.9 (*)     POC PO2 18 (*)     POC SATURATED O2 20 (*)     All other components within normal limits   LACTIC ACID, PLASMA   MAGNESIUM   PHOSPHORUS   B-TYPE NATRIURETIC PEPTIDE   POCT GLUCOSE MONITORING CONTINUOUS        ECG Results              EKG 12-lead (Final result)  Result time 08/17/23 16:33:38      Final result by Interface, Lab In [facility] (08/17/23 16:33:38)                   Narrative:    Test Reason : M79.89,    Vent. Rate : 074 BPM     Atrial Rate : 074 BPM     P-R Int : 136 ms          QRS Dur : 068 ms       QT Int : 370 ms       P-R-T Axes : -17 000 097 degrees     QTc Int : 410 ms    Normal sinus rhythm  T wave abnormality, consider lateral ischemia  Abnormal ECG  When compared with ECG of 06-AUG-2023 17:22,  No significant change was found  Confirmed by Nader PADILLA MD ([id number]) on 8/17/2023 4:33:25 PM    Referred By: ELVA   SELF           Confirmed By:Nader PADILLA MD                                  Imaging Results              X-Ray Chest AP Portable (In process)                      Medications   sodium chloride 0.9% flush 10 mL (has no administration in time range)   melatonin tablet 6 mg (has no administration in time range)   anastrozole tablet 1 mg (has no administration in time range)   atorvastatin tablet 80 mg (has no administration in time range)   traZODone tablet 100 mg (has no administration in time range)   levothyroxine tablet 50 mcg (has no administration in time range)   oxybutynin 24 hr tablet 10 mg (has no administration in time range)   ondansetron disintegrating tablet 8 mg (has no administration in time range)   HYDROcodone-acetaminophen  mg per tablet 1 tablet (has no administration in time range)   ergocalciferol capsule 50,000 Units (has no administration in time range)   insulin detemir U-100 (Levemir) pen 8 Units (has no administration in time range)   methocarbamoL tablet 1,000 mg (has no administration in time range)   apixaban tablet 5 mg (has no administration in time range)   mupirocin 2 % ointment (has no administration in time range)   hydrALAZINE tablet 50 mg (has no administration in time range)   furosemide tablet 40 mg (has no administration in time range)   carvediloL tablet 6.25 mg (has no administration in time range)   magnesium oxide tablet 400 mg (has no administration in time range)   acetaminophen tablet 650 mg (has no administration in time range)   sumatriptan tablet 100 mg (has no administration in time range)   NIFEdipine 24 hr tablet 90 mg (has no  administration in time range)   isosorbide mononitrate 24 hr tablet 30 mg (has no administration in time range)   sevelamer carbonate tablet 800 mg (has no administration in time range)   sodium chloride 0.9% flush 10 mL (has no administration in time range)   naloxone 0.4 mg/mL injection 0.02 mg (has no administration in time range)   glucose chewable tablet 16 g (has no administration in time range)   glucose chewable tablet 24 g (has no administration in time range)   glucagon (human recombinant) injection 1 mg (has no administration in time range)   senna-docusate 8.6-50 mg per tablet 1 tablet (has no administration in time range)   polyethylene glycol packet 17 g (has no administration in time range)   insulin aspart U-100 pen 0-5 Units (has no administration in time range)     Medical Decision Making:   Clinical Tests:   Lab Tests: Reviewed and Ordered  Radiological Study: Ordered and Reviewed  Medical Tests: Ordered and Reviewed  ED Management:  74 yo female with CKD4 on dialysis (tunneled HT cath) scheduled for MWF since discharge from hospital on 8/11/23. She presents because she has not been able to make dialysis this week. Social work consulted for transportation issues which per patient is why she has been missing her dialysis this week. She has no complaints at this time. EKG normal sinus. No evidence of hyperkalemia changes. Will need to assess electrolytes, acidosis, volume status. Will check CBC, CMP, VBG, CXR. Patient will possibly need to be dialyzed today.     Labs showing ph 7.26 pointing at a metabolic acidosis.  Potassium normal at 4.7. Pt is volume overloaded by physical exam (bilateral leg swelling) She will be admitted to Observation for dialysis while Social Work meantime arranges reliable transportation to her MWF dialysis sessions.              Attending Attestation:         Attending Critical Care:   Critical Care Times:   Direct Patient Care (initial evaluation, reassessments, and time  considering the case)................................................................15 minutes.   Additional History from reviewing old medical records or taking additional history from the family, EMS, PCP, etc.......................5 minutes.   Ordering, Reviewing, and Interpreting Diagnostic Studies...............................................................................................................5 minutes.   Documentation..................................................................................................................................................................................5 minutes.   Consultation with other Physicians. .................................................................................................................................................10 minutes.   ==============================================================  Total Critical Care Time - exclusive of procedural time: 40 minutes.  ==============================================================  Critical care was necessary to treat or prevent imminent or life-threatening deterioration of the following conditions: renal failure and congestive heart failure.   Critical care was time spent personally by me on the following activities: obtaining history from patient or relative, examination of patient, review of old charts, ordering lab, x-rays, and/or EKG, development of treatment plan with patient or relative, ordering and performing treatments and interventions, evaluation of patient's response to treatment, interpretation of cardiac measurements, discussion with consultants and re-evaluation of patient's conition.   Critical Care Condition: life-threatening       Attending ED Notes:   Patient presents with volume overload in the setting of multiple missed episodes of dialysis due to transportation issues.  Social work engaged to help solidify patient's transport plan as an outpatient as it appears they  have no showed on multiple occasions since discharge.  Acidosis is not as significant as most recent admission in her acidemia has improved as well.  However, her volume status and hypertension of worsened.  Will plan for emergent dialysis with dispositioned home to follow-up for ongoing outpatient dialysis.      ED Course as of 08/17/23 1816   Thu Aug 17, 2023   1534 72 yo F w/ h/o ESRD s/p 6 month hiatus. New tunneled catheter placed earlier this month and discharged for dialysis. Unable to achieve dialysis d/t transport issues. H/o recent admission d/t acidosis in the setting of missed dialysis   [DS]   1551 PH 7.26.  Consistent with mild respiratory and metabolic mixed acidosis [DS]   1713 BNP(!): 495 [MK]      ED Course User Index  [DS] Sessions, Jose Enrique ZAMORANO MD  [MK] Christin English MD                   Clinical Impression:   Final diagnoses:  [M79.89] Leg swelling  [E87.70] Volume overload (Primary)  [N18.6] ESRD (end stage renal disease)        ED Disposition Condition    Observation Stable                Christin English MD  Resident  08/17/23 1819       Nadia, Jose Enrique ZAMORANO MD  08/18/23 1102

## 2023-08-17 NOTE — CONSULTS
Consult received. Labs pending. Discussed with fellow on call. Consent obtained and placed in chart. Full consult note to follow in am.     Lamar Negro NP   Nephrology

## 2023-08-17 NOTE — ASSESSMENT & PLAN NOTE
-Chronic, hypertensive in the ED likely due to volume overload.  -Continue coreg, nifedipine, imdur, and lasix.  -PRN hydralazine for SBP >180  -Continue to monitor BP closely.

## 2023-08-17 NOTE — PHARMACY MED REC
"Admission Medication History     The home medication history was taken by Augusto Jiménez.    You may go to "Admission" then "Reconcile Home Medications" tabs to review and/or act upon these items.     The home medication list has been updated by the Pharmacy department.   Please read ALL comments highlighted in yellow.   Please address this information as you see fit.    Feel free to contact us if you have any questions or require assistance.      The medications listed below were removed from the home medication list. Please reorder if appropriate:  Patient reports no longer taking the following medication(s):  ONDANSETRON ODT 8 MG TABLET      Current Outpatient Medications on File Prior to Encounter   Medication Sig    amLODIPine (NORVASC) 5 MG tablet   Take 5 mg by mouth once daily.    anastrozole (ARIMIDEX) 1 mg Tab   Take 1 tablet (1 mg total) by mouth once daily.    apixaban (ELIQUIS) 5 mg Tab     Take 1 tablet (5 mg total) by mouth 2 (two) times daily.    atorvastatin (LIPITOR) 80 MG tablet   Take 1 tablet (80 mg total) by mouth once daily.    calcium acetate,phosphat bind, (PHOSLO) 667 mg tablet   Take 1 tablet (667 mg total) by mouth 3 (three) times daily with meals.    carvediloL (COREG) 6.25 MG tablet   Take 1 tablet (6.25 mg total) by mouth 2 (two) times daily.    DULoxetine (CYMBALTA) 20 MG capsule   Take 20 mg by mouth 2 (two) times daily.    erenumab-aooe (AIMOVIG) 140 mg/mL autoinjector   Inject 1 mL (140 mg total) into the skin every 30 days.    ergocalciferol (ERGOCALCIFEROL) 50,000 unit Cap   TAKE ONE CAPSULE BY MOUTH ONE TIME PER WEEK, TAKES ON TUESDAYS    furosemide (LASIX) 40 MG tablet   Take 1 tablet (40 mg total) by mouth once daily.    HYDROcodone-acetaminophen (NORCO)  mg per tablet   Take 1 tablet by mouth every 6 (six) hours as needed for Pain.    insulin (LANTUS SOLOSTAR U-100 INSULIN) glargine 100 units/mL SubQ pen   INJECT 14-16 UNITS UNDER THE SKIN ONCE DAILY    isosorbide " "mononitrate (IMDUR) 30 MG 24 hr tablet   Take 1 tablet (30 mg total) by mouth once daily.    magnesium oxide (MAG-OX) 400 mg (241.3 mg magnesium) tablet   Take 1 tablet (400 mg total) by mouth once daily.    methocarbamoL (ROBAXIN) 750 MG Tab   TAKE 1 TABLET BY MOUTH 3 (THREE) TIMES DAILY AS NEEDED (MUSCLE SPASMS).    NIFEdipine (PROCARDIA-XL) 90 MG (OSM) 24 hr tablet   Take 1 tablet (90 mg total) by mouth once daily.    oxybutynin (DITROPAN-XL) 10 MG 24 hr tablet   Take 1 tablet (10 mg total) by mouth once daily.    sodium bicarbonate 650 MG tablet   Take 650 mg by mouth 2 (two) times daily.    sumatriptan (IMITREX) 100 MG tablet   Take 1 tablet (100 mg total) by mouth 2 (two) times daily as needed for Migraine.    SYNTHROID 50 mcg tablet               TAKE 1 TABLET BY MOUTH BEFORE BREAKFAST. ADMINISTER ON AN EMPTY STOMACH AT LEAST 30 MINUTES BEFORE FOOD. IF RECEIVING TUBE FEEDS, HOLD TUBE FEEDS FOR 1 HOUR BEFORE AND AFTER LEVOTHYROXINE ADMINISTRATION.    traZODone (DESYREL) 100 MG tablet   TAKE 1 TABLET BY MOUTH NIGHTLY AS NEEDED FOR INSOMNIA.    BD INSULIN SYRINGE ULTRA-FINE 0.5 mL 31 gauge x 5/16" Syrg   USE WITH INSULIN 4 TIMES A DAY    pen needle, diabetic (BD ULTRA-FINE SHORT PEN NEEDLE) 31 gauge x 5/16" Ndle USE WITH LANTUS DAILY, e 11.65       Augusto Jiménez  EXT [id number]                  .          "

## 2023-08-17 NOTE — ASSESSMENT & PLAN NOTE
Patient is chronically on statin.will continue for now. Monitor clinically. Last LDL was   Lab Results   Component Value Date    LDLCALC 127.4 01/31/2023

## 2023-08-17 NOTE — ASSESSMENT & PLAN NOTE
-Chronic hyponatremia 130 baseline, 128 on admission  -Likely worsened secondary to fluid overload  -Nephro consulted for HD  The patient's sodium results have been reviewed and are listed below.  Recent Labs   Lab 08/17/23  1528   *

## 2023-08-17 NOTE — ASSESSMENT & PLAN NOTE
Body mass index is 42.04 kg/m². Morbid besity complicates all aspects of disease management from diagnostic modalities to treatment.

## 2023-08-17 NOTE — ASSESSMENT & PLAN NOTE
Acute kidney injury superimposed on CKD  Cr 3.0 on admit, previous Cr bl ~2.2  -Reinitiated on HD during last admit 8/6-8/11 for acute on chronic kidney injury  -Last dialyzed 8/9, scheduled MWF but missed last 2 sessions due to transportation issues  -Endorses BLE edema;  and CXR in process. Currently on room air.  -Nephrology consulted in the ED, appreciate assistance.  -Continue Chronic hemodialysis.   -Monitor daily electrolytes and defer dialysis orders to nephrology.  -Renal diet, 1.5L FR  -Daily wt/ strict I&Os  -Renally dose medications  -Continue phosphorus binders

## 2023-08-18 VITALS
TEMPERATURE: 99 F | RESPIRATION RATE: 18 BRPM | DIASTOLIC BLOOD PRESSURE: 74 MMHG | WEIGHT: 276.44 LBS | HEIGHT: 68 IN | OXYGEN SATURATION: 96 % | SYSTOLIC BLOOD PRESSURE: 174 MMHG | BODY MASS INDEX: 41.9 KG/M2 | HEART RATE: 77 BPM

## 2023-08-18 LAB
ALBUMIN SERPL BCP-MCNC: 2.1 G/DL (ref 3.5–5.2)
ALP SERPL-CCNC: 96 U/L (ref 55–135)
ALT SERPL W/O P-5'-P-CCNC: 8 U/L (ref 10–44)
ANION GAP SERPL CALC-SCNC: 9 MMOL/L (ref 8–16)
AST SERPL-CCNC: 11 U/L (ref 10–40)
BASOPHILS # BLD AUTO: 0.04 K/UL (ref 0–0.2)
BASOPHILS NFR BLD: 0.5 % (ref 0–1.9)
BILIRUB SERPL-MCNC: 0.2 MG/DL (ref 0.1–1)
BUN SERPL-MCNC: 46 MG/DL (ref 8–23)
CALCIUM SERPL-MCNC: 8 MG/DL (ref 8.7–10.5)
CHLORIDE SERPL-SCNC: 99 MMOL/L (ref 95–110)
CO2 SERPL-SCNC: 23 MMOL/L (ref 23–29)
CREAT SERPL-MCNC: 3.1 MG/DL (ref 0.5–1.4)
DIFFERENTIAL METHOD: ABNORMAL
EOSINOPHIL # BLD AUTO: 0.3 K/UL (ref 0–0.5)
EOSINOPHIL NFR BLD: 3.6 % (ref 0–8)
ERYTHROCYTE [DISTWIDTH] IN BLOOD BY AUTOMATED COUNT: 14.6 % (ref 11.5–14.5)
EST. GFR  (NO RACE VARIABLE): 15.5 ML/MIN/1.73 M^2
GLUCOSE SERPL-MCNC: 88 MG/DL (ref 70–110)
HCT VFR BLD AUTO: 23.4 % (ref 37–48.5)
HGB BLD-MCNC: 7 G/DL (ref 12–16)
IMM GRANULOCYTES # BLD AUTO: 0.05 K/UL (ref 0–0.04)
IMM GRANULOCYTES NFR BLD AUTO: 0.6 % (ref 0–0.5)
LYMPHOCYTES # BLD AUTO: 2 K/UL (ref 1–4.8)
LYMPHOCYTES NFR BLD: 24.8 % (ref 18–48)
MAGNESIUM SERPL-MCNC: 1.8 MG/DL (ref 1.6–2.6)
MCH RBC QN AUTO: 27.1 PG (ref 27–31)
MCHC RBC AUTO-ENTMCNC: 29.9 G/DL (ref 32–36)
MCV RBC AUTO: 91 FL (ref 82–98)
MONOCYTES # BLD AUTO: 0.7 K/UL (ref 0.3–1)
MONOCYTES NFR BLD: 9.3 % (ref 4–15)
NEUTROPHILS # BLD AUTO: 4.9 K/UL (ref 1.8–7.7)
NEUTROPHILS NFR BLD: 61.2 % (ref 38–73)
NRBC BLD-RTO: 0 /100 WBC
PHOSPHATE SERPL-MCNC: 4.9 MG/DL (ref 2.7–4.5)
PLATELET # BLD AUTO: 214 K/UL (ref 150–450)
PMV BLD AUTO: 10.3 FL (ref 9.2–12.9)
POCT GLUCOSE: 101 MG/DL (ref 70–110)
POCT GLUCOSE: 142 MG/DL (ref 70–110)
POCT GLUCOSE: 172 MG/DL (ref 70–110)
POCT GLUCOSE: 244 MG/DL (ref 70–110)
POTASSIUM SERPL-SCNC: 4.6 MMOL/L (ref 3.5–5.1)
PROT SERPL-MCNC: 4.9 G/DL (ref 6–8.4)
RBC # BLD AUTO: 2.58 M/UL (ref 4–5.4)
SODIUM SERPL-SCNC: 131 MMOL/L (ref 136–145)
WBC # BLD AUTO: 7.97 K/UL (ref 3.9–12.7)

## 2023-08-18 PROCEDURE — 99239 PR HOSPITAL DISCHARGE DAY,>30 MIN: ICD-10-PCS | Mod: HCNC,,,

## 2023-08-18 PROCEDURE — 25000003 PHARM REV CODE 250: Mod: HCNC | Performed by: NURSE PRACTITIONER

## 2023-08-18 PROCEDURE — 85025 COMPLETE CBC W/AUTO DIFF WBC: CPT | Mod: HCNC | Performed by: NURSE PRACTITIONER

## 2023-08-18 PROCEDURE — 80053 COMPREHEN METABOLIC PANEL: CPT | Mod: HCNC | Performed by: NURSE PRACTITIONER

## 2023-08-18 PROCEDURE — 83735 ASSAY OF MAGNESIUM: CPT | Mod: HCNC | Performed by: NURSE PRACTITIONER

## 2023-08-18 PROCEDURE — G0257 UNSCHED DIALYSIS ESRD PT HOS: HCPCS | Mod: HCNC

## 2023-08-18 PROCEDURE — 99214 OFFICE O/P EST MOD 30 MIN: CPT | Mod: HCNC,,, | Performed by: NURSE PRACTITIONER

## 2023-08-18 PROCEDURE — 84100 ASSAY OF PHOSPHORUS: CPT | Mod: HCNC | Performed by: NURSE PRACTITIONER

## 2023-08-18 PROCEDURE — G0378 HOSPITAL OBSERVATION PER HR: HCPCS | Mod: HCNC

## 2023-08-18 PROCEDURE — 63600175 PHARM REV CODE 636 W HCPCS: Mod: HCNC | Performed by: NURSE PRACTITIONER

## 2023-08-18 PROCEDURE — 80100014 HC HEMODIALYSIS 1:1: Mod: HCNC

## 2023-08-18 PROCEDURE — 99239 HOSP IP/OBS DSCHRG MGMT >30: CPT | Mod: HCNC,,,

## 2023-08-18 PROCEDURE — 36415 COLL VENOUS BLD VENIPUNCTURE: CPT | Mod: HCNC | Performed by: NURSE PRACTITIONER

## 2023-08-18 PROCEDURE — 99214 PR OFFICE/OUTPT VISIT, EST, LEVL IV, 30-39 MIN: ICD-10-PCS | Mod: HCNC,,, | Performed by: NURSE PRACTITIONER

## 2023-08-18 PROCEDURE — 96372 THER/PROPH/DIAG INJ SC/IM: CPT | Performed by: NURSE PRACTITIONER

## 2023-08-18 RX ORDER — CLONIDINE HYDROCHLORIDE 0.1 MG/1
0.1 TABLET ORAL ONCE
Status: COMPLETED | OUTPATIENT
Start: 2023-08-18 | End: 2023-08-18

## 2023-08-18 RX ORDER — SODIUM CHLORIDE 9 MG/ML
INJECTION, SOLUTION INTRAVENOUS ONCE
Status: COMPLETED | OUTPATIENT
Start: 2023-08-18 | End: 2023-08-18

## 2023-08-18 RX ADMIN — ANASTROZOLE 1 MG: 1 TABLET, COATED ORAL at 08:08

## 2023-08-18 RX ADMIN — MUPIROCIN: 20 OINTMENT TOPICAL at 08:08

## 2023-08-18 RX ADMIN — SENNOSIDES AND DOCUSATE SODIUM 1 TABLET: 50; 8.6 TABLET ORAL at 08:08

## 2023-08-18 RX ADMIN — INSULIN DETEMIR 8 UNITS: 100 INJECTION, SOLUTION SUBCUTANEOUS at 08:08

## 2023-08-18 RX ADMIN — APIXABAN 5 MG: 5 TABLET, FILM COATED ORAL at 08:08

## 2023-08-18 RX ADMIN — CARVEDILOL 6.25 MG: 3.12 TABLET, FILM COATED ORAL at 08:08

## 2023-08-18 RX ADMIN — SEVELAMER CARBONATE 800 MG: 800 TABLET, FILM COATED ORAL at 11:08

## 2023-08-18 RX ADMIN — METHOCARBAMOL 1000 MG: 500 TABLET ORAL at 08:08

## 2023-08-18 RX ADMIN — HYDROCODONE BITARTRATE AND ACETAMINOPHEN 1 TABLET: 10; 325 TABLET ORAL at 05:08

## 2023-08-18 RX ADMIN — INSULIN ASPART 1 UNITS: 100 INJECTION, SOLUTION INTRAVENOUS; SUBCUTANEOUS at 08:08

## 2023-08-18 RX ADMIN — ISOSORBIDE MONONITRATE 30 MG: 30 TABLET, EXTENDED RELEASE ORAL at 08:08

## 2023-08-18 RX ADMIN — CLONIDINE HYDROCHLORIDE 0.1 MG: 0.1 TABLET ORAL at 01:08

## 2023-08-18 RX ADMIN — Medication 400 MG: at 08:08

## 2023-08-18 RX ADMIN — SUMATRIPTAN SUCCINATE 100 MG: 50 TABLET ORAL at 10:08

## 2023-08-18 RX ADMIN — NIFEDIPINE 90 MG: 30 TABLET, FILM COATED, EXTENDED RELEASE ORAL at 08:08

## 2023-08-18 RX ADMIN — SODIUM CHLORIDE: 9 INJECTION, SOLUTION INTRAVENOUS at 02:08

## 2023-08-18 RX ADMIN — HYDROCODONE BITARTRATE AND ACETAMINOPHEN 1 TABLET: 10; 325 TABLET ORAL at 08:08

## 2023-08-18 RX ADMIN — OXYBUTYNIN CHLORIDE 10 MG: 10 TABLET, EXTENDED RELEASE ORAL at 08:08

## 2023-08-18 RX ADMIN — SEVELAMER CARBONATE 800 MG: 800 TABLET, FILM COATED ORAL at 08:08

## 2023-08-18 RX ADMIN — FUROSEMIDE 40 MG: 40 TABLET ORAL at 08:08

## 2023-08-18 RX ADMIN — ATORVASTATIN CALCIUM 80 MG: 40 TABLET, FILM COATED ORAL at 08:08

## 2023-08-18 RX ADMIN — LEVOTHYROXINE SODIUM 50 MCG: 50 TABLET ORAL at 05:08

## 2023-08-18 NOTE — SUBJECTIVE & OBJECTIVE
Past Medical History:   Diagnosis Date    Cervicogenic migraine     Chronic pain     CKD (chronic kidney disease) stage 4, GFR 15-29 ml/min     Dr Piedadmoody    CKD (chronic kidney disease) stage 4, GFR 15-29 ml/min     Diabetes mellitus     Long term use of Insulin, Diabetic Neuropathy    Dialysis patient 1/21/2022    Fibromyalgia     Hydronephrosis     Hyperlipidemia     Hypertension 12/12/2012    Hypothyroidism 12/12/2012    ARON (iron deficiency anemia)     Insomnia     Levoscoliosis     Malignant neoplasm of upper-outer quadrant of left breast in female, estrogen receptor positive     Metabolic acidosis     Mobility impaired     Nuclear sclerosis - Both Eyes 3/24/2014    VIJAYA (obstructive sleep apnea)     Osteopenia     Pulmonary nodule     Recurrent UTI     Renal manifestation of secondary diabetes mellitus     Secondary hyperparathyroidism, renal     Urinary retention        Past Surgical History:   Procedure Laterality Date    BIOPSY OF URETER Left 2/18/2022    Procedure: BIOPSY, URETER;  Surgeon: Ronel Whittington MD;  [facility];  Service: Urology;  Laterality: Left;    BREAST BIOPSY Right     benign    CHOLECYSTECTOMY      COLONOSCOPY N/A 1/13/2017    Procedure: COLONOSCOPY;  Surgeon: Morris Wiseman MD;  Location: [facility] (4TH FLR);  Service: Endoscopy;  Laterality: N/A;  Renal pt Nephrology advised to avoid phosphate preps    CYSTOSCOPY N/A 10/8/2018    Procedure: CYSTOSCOPY;  Surgeon: Ronel Whittington MD;  [facility];  Service: Urology;  Laterality: N/A;  45 min    CYSTOSCOPY N/A 3/25/2019    Procedure: CYSTOSCOPY;  Surgeon: Ronel Whittington MD;  [facility];  Service: Urology;  Laterality: N/A;  45 min    CYSTOSCOPY N/A 8/26/2019    Procedure: CYSTOSCOPY;  Surgeon: Ronel Whittington MD;  [facility];  Service: Urology;  Laterality: N/A;  45 min    CYSTOSCOPY N/A 7/2/2021    Procedure: CYSTOSCOPY;  Surgeon: Ronel FRAZIER  MD Pk;  [facility];  Service: Urology;  Laterality: N/A;    CYSTOSCOPY  12/23/2021    Procedure: CYSTOSCOPY;  Surgeon: Ronel Whittington MD;  [facility];  Service: Urology;;    CYSTOSCOPY  2/18/2022    Procedure: CYSTOSCOPY;  Surgeon: Ronel Whittington MD;  [facility];  Service: Urology;;    CYSTOSCOPY W/ URETERAL STENT PLACEMENT Left 5/15/2021    Procedure: CYSTOSCOPY, WITH URETERAL STENT INSERTION;  Surgeon: Levy Sánchez Jr., MD;  [facility];  Service: Urology;  Laterality: Left;    CYSTOSCOPY WITH BIOPSY OF BLADDER N/A 1/27/2020    Procedure: CYSTOSCOPY, WITH BLADDER BIOPSY, WITH FULGURATION IF INDICATED;  Surgeon: Ronel Whittington MD;  Location: [facility] OR [address] [place] [address];  Service: Urology;  Laterality: N/A;    DILATION AND CURETTAGE OF UTERUS      GALLBLADDER SURGERY  2006    HYSTERECTOMY      INJECTION FOR SENTINEL NODE IDENTIFICATION Left 8/1/2019    Procedure: INJECTION, FOR SENTINEL NODE IDENTIFICATION;  Surgeon: Samia Fulton MD;  Location: [facility] OR [address] [place] [address];  Service: General;  Laterality: Left;    INJECTION OF BOTULINUM TOXIN TYPE A N/A 10/8/2018    Procedure: INJECTION, BOTULINUM TOXIN, TYPE A 300 UNITS;  Surgeon: Ronel Whittington MD;  Location: [facility] OR [address] [place] [address];  Service: Urology;  Laterality: N/A;    INJECTION OF BOTULINUM TOXIN TYPE A N/A 3/25/2019    Procedure: INJECTION, BOTULINUM TOXIN, TYPE A 300 UNITS;  Surgeon: Ronel Whittington MD;  Location: [facility] OR [address] [place] [address];  Service: Urology;  Laterality: N/A;    INJECTION OF BOTULINUM TOXIN TYPE A N/A 8/26/2019    Procedure: INJECTION, BOTULINUM TOXIN, TYPE A 300 UNITS;  Surgeon: Ronel Whittington MD;  [facility];  Service: Urology;  Laterality: N/A;    MASTECTOMY Left 8/1/2019    Procedure: MASTECTOMY 23 hour stay;  Surgeon: Samia Fulton MD;  Location: [facility] OR 2ND FLR;  Service: General;  Laterality: Left;    MEDIPORT REMOVAL Right 6/24/2022     Procedure: REMOVAL, CATHETER, CENTRAL VENOUS, TUNNELED, WITH PORT;  Surgeon: Isauro Anderson MD;  Location: [facility] CATH LAB;  Service: Radiology;  Laterality: Right;    OVARIAN CYST SURGERY  1985    REPLACEMENT OF STENT Left 12/23/2021    Procedure: REPLACEMENT, STENT;  Surgeon: Ronel Whittington MD;  Location: [facility] OR [facility];  Service: Urology;  Laterality: Left;    REPLACEMENT OF STENT Left 2/18/2022    Procedure: REPLACEMENT, STENT;  Surgeon: Ronel Whittington MD;  Location: [facility] OR 1ST FLR;  Service: Urology;  Laterality: Left;    RETROGRADE PYELOGRAPHY Left 2/18/2022    Procedure: PYELOGRAM, RETROGRADE;  Surgeon: Ronel Whittington MD;  Location: [facility] OR 1ST FLR;  Service: Urology;  Laterality: Left;    SENTINEL LYMPH NODE BIOPSY Left 8/1/2019    Procedure: BIOPSY, LYMPH NODE, SENTINEL;  Surgeon: Samia Fulton MD;  [facility];  Service: General;  Laterality: Left;    spt placement      TONSILLECTOMY, ADENOIDECTOMY      TOTAL ABDOMINAL HYSTERECTOMY W/ BILATERAL SALPINGOOPHORECTOMY  1985    URETEROSCOPY Left 2/18/2022    Procedure: URETEROSCOPY;  Surgeon: Ronel Whittington MD;  [facility];  Service: Urology;  Laterality: Left;       Review of patient's allergies indicates:  No Known Allergies  Current Facility-Administered Medications   Medication Frequency    0.9%  NaCl infusion Once    acetaminophen tablet 650 mg Q6H PRN    anastrozole tablet 1 mg Daily    apixaban tablet 5 mg BID    atorvastatin tablet 80 mg Daily    carvediloL tablet 6.25 mg BID    ergocalciferol capsule 50,000 Units Q7 Days    furosemide tablet 40 mg Daily    glucagon (human recombinant) injection 1 mg PRN    glucose chewable tablet 16 g PRN    glucose chewable tablet 24 g PRN    hydrALAZINE tablet 50 mg Q8H PRN    HYDROcodone-acetaminophen  mg per tablet 1 tablet Q6H PRN    insulin aspart U-100 pen 0-5 Units QID (AC + HS) PRN    insulin detemir U-100 (Levemir) pen 8 Units QHS     isosorbide mononitrate 24 hr tablet 30 mg Daily    levothyroxine tablet 50 mcg Before breakfast    magnesium oxide tablet 400 mg Daily    melatonin tablet 6 mg Nightly PRN    methocarbamoL tablet 1,000 mg TID    naloxone 0.4 mg/mL injection 0.02 mg PRN    NIFEdipine 24 hr tablet 90 mg Daily    oxybutynin 24 hr tablet 10 mg Daily    polyethylene glycol packet 17 g BID PRN    prochlorperazine injection Soln 5 mg Q6H PRN    senna-docusate 8.6-50 mg per tablet 1 tablet BID    sevelamer carbonate tablet 800 mg TID WM    sodium chloride 0.9% flush 10 mL PRN    sumatriptan tablet 100 mg BID PRN    traZODone tablet 100 mg QHS     Family History       Problem Relation (Age of Onset)    ALS Mother    Cancer Maternal Grandmother, Paternal Grandfather, Brother    Diabetes Sister, Maternal Aunt, Maternal Uncle    Kidney disease Sister, Maternal Aunt    Prostate cancer Brother    Scoliosis Brother          Tobacco Use    Smoking status: Never    Smokeless tobacco: Never   Substance and Sexual Activity    Alcohol use: No     Alcohol/week: 0.0 standard drinks of alcohol    Drug use: No    Sexual activity: Not Currently     Birth control/protection: Abstinence     Review of Systems   Constitutional: Negative.    HENT: Negative.     Respiratory:  Negative for shortness of breath.    Cardiovascular:  Positive for leg swelling. Negative for chest pain.   Gastrointestinal: Negative.    Genitourinary: Negative.    Musculoskeletal: Negative.    Skin: Negative.      Objective:     Vital Signs (Most Recent):  Temp: 98 °F (36.7 °C) (08/18/23 1130)  Pulse: 61 (08/18/23 1130)  Resp: 20 (08/18/23 1130)  BP: (!) 164/74 (08/18/23 1130)  SpO2: 99 % (08/18/23 1130) Vital Signs (24h Range):  Temp:  [98 °F (36.7 °C)-98.6 °F (37 °C)] 98 °F (36.7 °C)  Pulse:  [57-98] 61  Resp:  [10-23] 20  SpO2:  [95 %-100 %] 99 %  BP: (148-219)/(65-97) 164/74     Weight: 125.4 kg (276 lb 7.3 oz) (08/17/23 2025)  Body mass index is 42.04 kg/m².  Body surface area is  2.45 meters squared.    I/O last 3 completed shifts:  In: -   Out: 2550 [Urine:2550]     Physical Exam  Vitals and nursing note reviewed.   Constitutional:       General: She is not in acute distress.     Appearance: She is obese. She is not toxic-appearing or diaphoretic.   HENT:      Head: Normocephalic and atraumatic.      Nose: Nose normal.      Mouth/Throat:      Mouth: Mucous membranes are moist.   Eyes:      Pupils: Pupils are equal, round, and reactive to light.   Cardiovascular:      Rate and Rhythm: Normal rate and regular rhythm.      Pulses: Normal pulses.      Heart sounds: No murmur heard.  Pulmonary:      Effort: Pulmonary effort is normal. No respiratory distress.      Breath sounds: No wheezing, rhonchi or rales.      Comments: Currently on room air  Chest:      Comments: R chest wall tunneled catheter; no surrounding erythema, drainage, or swelling  Abdominal:      General: Bowel sounds are normal. There is no distension.      Palpations: Abdomen is soft.      Tenderness: There is no abdominal tenderness. There is no guarding.      Comments: Suprapubic catheter in place draining hazy, sediment filled yellow urine   Musculoskeletal:         General: No tenderness or deformity. Normal range of motion.      Cervical back: Normal range of motion.      Right lower leg: Edema present.      Left lower leg: Edema present.   Skin:     General: Skin is warm and dry.      Capillary Refill: Capillary refill takes less than 2 seconds.      Comments: Dry flaky skin noted to BLE   Neurological:      General: No focal deficit present.      Mental Status: She is alert and oriented to person, place, and time.      Sensory: No sensory deficit.      Motor: Weakness (BLE) present.   Psychiatric:         Mood and Affect: Mood normal.         Behavior: Behavior normal.          Significant Labs:  CBC:   Recent Labs   Lab 08/18/23  0522   WBC 7.97   RBC 2.58*   HGB 7.0*   HCT 23.4*      MCV 91   MCH 27.1   MCHC  29.9*     CMP:   Recent Labs   Lab 08/18/23  0522   GLU 88   CALCIUM 8.0*   ALBUMIN 2.1*   PROT 4.9*   *   K 4.6   CO2 23   CL 99   BUN 46*   CREATININE 3.1*   ALKPHOS 96   ALT 8*   AST 11   BILITOT 0.2     All labs within the past 24 hours have been reviewed.

## 2023-08-18 NOTE — ASSESSMENT & PLAN NOTE
-Chronic hyponatremia 130 baseline, 128 on admission -> improved on repeat   -Likely worsened secondary to fluid overload  -Nephro consulted for HD  The patient's sodium results have been reviewed and are listed below.  Recent Labs   Lab 08/18/23  0522   *

## 2023-08-18 NOTE — PLAN OF CARE
Problem: Hemodynamic Instability (Hemodialysis)  Goal: Effective Tissue Perfusion  Outcome: Ongoing, Progressing     Problem: Device-Related Complication Risk (Hemodialysis)  Goal: Safe, Effective Therapy Delivery  Outcome: Ongoing, Progressing

## 2023-08-18 NOTE — PLAN OF CARE
Problem: Adult Inpatient Plan of Care  Goal: Plan of Care Review  Outcome: Ongoing, Progressing  Goal: Patient-Specific Goal (Individualized)  Outcome: Ongoing, Progressing  Goal: Absence of Hospital-Acquired Illness or Injury  Outcome: Ongoing, Progressing  Goal: Optimal Comfort and Wellbeing  Outcome: Ongoing, Progressing  Goal: Readiness for Transition of Care  Outcome: Ongoing, Progressing     Problem: Diabetes Comorbidity  Goal: Blood Glucose Level Within Targeted Range  Outcome: Ongoing, Progressing     Problem: Fluid and Electrolyte Imbalance (Acute Kidney Injury/Impairment)  Goal: Fluid and Electrolyte Balance  Outcome: Ongoing, Progressing     Problem: Oral Intake Inadequate (Acute Kidney Injury/Impairment)  Goal: Optimal Nutrition Intake  Outcome: Ongoing, Progressing     Problem: Renal Function Impairment (Acute Kidney Injury/Impairment)  Goal: Effective Renal Function  Outcome: Ongoing, Progressing     Problem: Infection  Goal: Absence of Infection Signs and Symptoms  Outcome: Ongoing, Progressing     Problem: Impaired Wound Healing  Goal: Optimal Wound Healing  Outcome: Ongoing, Progressing     Problem: Bariatric Environmental Safety  Goal: Safety Maintained with Care  Outcome: Ongoing, Progressing     Problem: Device-Related Complication Risk (Hemodialysis)  Goal: Safe, Effective Therapy Delivery  Outcome: Ongoing, Progressing     Problem: Hemodynamic Instability (Hemodialysis)  Goal: Effective Tissue Perfusion  Outcome: Ongoing, Progressing     Problem: Infection (Hemodialysis)  Goal: Absence of Infection Signs and Symptoms  Outcome: Ongoing, Progressing

## 2023-08-18 NOTE — PLAN OF CARE
[facility] - Observation 11H  Initial Discharge Assessment       Primary Care Provider: Lurdes Navarro MD    Admission Diagnosis: Leg swelling [M79.89]  ESRD (end stage renal disease) [N18.6]  Volume overload [E87.70]  Chest pain [R07.9]    Admission Date: 8/17/2023  Expected Discharge Date: 8/18/2023         Payor: HU[employer] MANAGED MEDICARE / Plan: [employer] SNP HMO PPO SPECIAL NEEDS / Product Type: Medicare Advantage /     Extended Emergency Contact Information  Primary Emergency Contact: Kat Sánchez  Address: [address] [place] [address]           [place] [address] [facility]  Home Phone: [phone]  Mobile Phone: [phone]  Relation: Sister    Discharge Plan A: (P) Home with family  Discharge Plan B: (P) Home with family      [id number] [place] - [address]  [address]  [place] [address]  Phone: [phone] Fax: [phone]    [id number] - [place] - [address] AT [facility] [address]  [address] [place] [address]  Phone: [phone] Fax: [phone]             SW completed Discharge Planning Assessment with patient via bedside. Discharge planning booklet given to patient/family and whiteboard updated with FLORENTINO and phone #. All questions answered.    Patient reported that she will need assistance with transportation upon discharge.     Patient reported that her sister lives at home with her, and prior to hospitalization she needed assistance with her ADL's. Patient reported that she uses a hospital bed and wheelchair. Patient reported that she starting dialysis again, and is new to [facility]. Patient reported that she is a Mon, Wed, Fri; however, has not been to her HD sessions yet due to her transportation not picking her up. Patient reported that she does not go to a Coumadin clinic.     SW attempted to contact the SW at [facility] to confirm patient's transportation issues; however, there was no  answer. CHRIS sent a secure chat message to [facility] CHRIS for follow up. Patient informed SW that she receives [employer] transportation through [facility] ([phone]). CHRIS contacted [facility] to confirm that patient has transportation arranged and to find out when her scheduled  time is. CHRIS spoke with Sakshi who confirmed that patient is scheduled to be picked up on Mon, Wed, and Fri at 1:30PM in order for her to make it to her HD sessions. Sakshi also confirmed that patient's next appointments are for Aug. 21st, 23rd, and 25th. CHRIS informed Sakshi that patient reported that no one has picked her up previously on Aug. 14th and 16th. Sakshi reported that patient should have been picked up and will make note of the discrepancy in patient's file.    CHRIS will inform patient that her transportation for next week has been confirmed.     Patient lives in a SSH with 0 steps to enter with ramp.     UPDATE    CHRIS confirmed with CHRIS at [facility] that patient still has her chair time scheduled for Monday, Aug. 21st.     CHRIS updated the medical team.    Anna Castellon LMSW  [facility]  Ext. [phone]

## 2023-08-18 NOTE — HOSPITAL COURSE
Cecile Bowen was admitted to hospital medicine for volume overload hyperphosphatemia. She was supposed to have restarted dialysis this week but has had multiple issues with her insurance-provided transportation service. CM reached out to transportation, confirmed patient is scheduled for transport to HD on Monday. Also confirmed that she will have a chair for regularly scheduled HD sessions at [facility]. Nephrology consulted during admission, patient received dialysis.  She is medically ready for discharge home. Discharge plan discussed and patient expressed understanding. All questions answered.

## 2023-08-18 NOTE — PROGRESS NOTES
Patient arrived via bed.  VSS.   HD Tx started via right subclavian HD catheter.  Isolation precautions maintained.

## 2023-08-18 NOTE — PLAN OF CARE
Problem: Adult Inpatient Plan of Care  Goal: Plan of Care Review  Outcome: Ongoing, Progressing     Problem: Diabetes Comorbidity  Goal: Blood Glucose Level Within Targeted Range  Outcome: Ongoing, Progressing     Problem: Oral Intake Inadequate (Acute Kidney Injury/Impairment)  Goal: Optimal Nutrition Intake  Outcome: Ongoing, Progressing     Problem: Impaired Wound Healing  Goal: Optimal Wound Healing  Outcome: Ongoing, Progressing     Problem: Device-Related Complication Risk (Hemodialysis)  Goal: Safe, Effective Therapy Delivery  Outcome: Ongoing, Progressing     Problem: Skin Injury Risk Increased  Goal: Skin Health and Integrity  Outcome: Ongoing, Progressing

## 2023-08-18 NOTE — CONSULTS
[facility]H  Nephrology  Consult Note    Patient Name: Cecile Bowen  MRN: [id number]  Admission Date: 8/17/2023  Hospital Length of Stay: 0 days  Attending Provider: Morris Stephens MD   Primary Care Physician: Lurdes Navarro MD  Principal Problem:Volume overload    Inpatient consult to Social Work  Consult performed by: Lamar Negro DNP  Consult ordered by: Jocelyne Acevedo NP  Reason for consult: ESRD on HD        Subjective:     HPI: Cecile Bowen is a  73 y.o. female with a PMHx of HTN, HLD, hypothyroidism, a fib on eliquis, DM2, obesity, VIJAYA, chronic pain, neurogenic bladder with suprapubic catheter, and CKD5 on HD who presents to the ED for missed HD sessions. Of note the patient was recently admitted to [facility] 8/6-8/11 for acute on chronic CKD and was reinitiated on HD due to worsening kidney function and metabolic acidosis. The patient was last dialyzed 8/9. She states she was scheduled to start HD on Monday but missed HD on Monday and Wednesday due to transportation issues. The patient endorses BLE edema that began about 5 months ago and is relatively unchanged. She denies shortness of breath or orthopnea. The patient does make urine. She notes a mild intermittent cough with yellow phlegm. The patient denies chest pain, fever, N/V/D, abdominal pain, or hematuria. She endorses chronic issues with migraines, constipation, and back pain which are at baseline. The patient is wheelchair bound at baseline and uses a christina lift to transfer.     In the ED, patient hypertensive but otherwise vitals stable, afebrile. CBC with stable anemia. Na 128 (bl ~130). Bicarb 22. Cr 3.0 (bl~2.2). Glucose 114. Calcium 8.3. Lactate 0.6. . pH 7.26. EKG shows SR, 74 bpm, no acute ischemic changes.     HPI obtained via EMR and patient interview       Past Medical History:   Diagnosis Date    Cervicogenic migraine     Chronic pain     CKD (chronic kidney disease) stage 4, GFR 15-29 ml/min      [facility]    CKD (chronic kidney disease) stage 4, GFR 15-29 ml/min     Diabetes mellitus     Long term use of Insulin, Diabetic Neuropathy    Dialysis patient 1/21/2022    Fibromyalgia     Hydronephrosis     Hyperlipidemia     Hypertension 12/12/2012    Hypothyroidism 12/12/2012    ARON (iron deficiency anemia)     Insomnia     Levoscoliosis     Malignant neoplasm of upper-outer quadrant of left breast in female, estrogen receptor positive     Metabolic acidosis     Mobility impaired     Nuclear sclerosis - Both Eyes 3/24/2014    VIJAYA (obstructive sleep apnea)     Osteopenia     Pulmonary nodule     Recurrent UTI     Renal manifestation of secondary diabetes mellitus     Secondary hyperparathyroidism, renal     Urinary retention        Past Surgical History:   Procedure Laterality Date    BIOPSY OF URETER Left 2/18/2022    Procedure: BIOPSY, URETER;  Surgeon: Ronel Whittington MD;  Location: [facility] OR [address] [place] [address];  Service: Urology;  Laterality: Left;    BREAST BIOPSY Right     benign    CHOLECYSTECTOMY      COLONOSCOPY N/A 1/13/2017    Procedure: COLONOSCOPY;  Surgeon: Morris Wiseman MD;  Location: [facility] (4TH FLR);  Service: Endoscopy;  Laterality: N/A;  Renal pt Nephrology advised to avoid phosphate preps    CYSTOSCOPY N/A 10/8/2018    Procedure: CYSTOSCOPY;  Surgeon: Ronel Whittington MD;  Location: [facility] OR [address] [place] [address];  Service: Urology;  Laterality: N/A;  45 min    CYSTOSCOPY N/A 3/25/2019    Procedure: CYSTOSCOPY;  Surgeon: Ronel Whittington MD;  [facility];  Service: Urology;  Laterality: N/A;  45 min    CYSTOSCOPY N/A 8/26/2019    Procedure: CYSTOSCOPY;  Surgeon: Ronel Whittington MD;  Location: [address];  Service: Urology;  Laterality: N/A;  45 min    CYSTOSCOPY N/A 7/2/2021    Procedure: CYSTOSCOPY;  Surgeon: Ronel Whittington MD;  Location: [address];  Service: Urology;  Laterality: N/A;    CYSTOSCOPY  12/23/2021     Procedure: CYSTOSCOPY;  Surgeon: Ronel Whittington MD;  Location: [facility] OR [address] [place] [address];  Service: Urology;;    CYSTOSCOPY  2/18/2022    Procedure: CYSTOSCOPY;  Surgeon: Ronel Whittington MD;  [facility];  Service: Urology;;    CYSTOSCOPY W/ URETERAL STENT PLACEMENT Left 5/15/2021    Procedure: CYSTOSCOPY, WITH URETERAL STENT INSERTION;  Surgeon: Levy Sánchez Jr., MD;  [facility];  Service: Urology;  Laterality: Left;    CYSTOSCOPY WITH BIOPSY OF BLADDER N/A 1/27/2020    Procedure: CYSTOSCOPY, WITH BLADDER BIOPSY, WITH FULGURATION IF INDICATED;  Surgeon: Ronel Whittington MD;  Location: [facility] OR [address] [place] [address];  Service: Urology;  Laterality: N/A;    DILATION AND CURETTAGE OF UTERUS      GALLBLADDER SURGERY  2006    HYSTERECTOMY      INJECTION FOR SENTINEL NODE IDENTIFICATION Left 8/1/2019    Procedure: INJECTION, FOR SENTINEL NODE IDENTIFICATION;  Surgeon: Samia Fulton MD;  Location: [facility] OR [address] [place] [address];  Service: General;  Laterality: Left;    INJECTION OF BOTULINUM TOXIN TYPE A N/A 10/8/2018    Procedure: INJECTION, BOTULINUM TOXIN, TYPE A 300 UNITS;  Surgeon: Ronel Whittington MD;  Location: [facility] OR [address] [place] [address];  Service: Urology;  Laterality: N/A;    INJECTION OF BOTULINUM TOXIN TYPE A N/A 3/25/2019    Procedure: INJECTION, BOTULINUM TOXIN, TYPE A 300 UNITS;  Surgeon: Ronel Whittington MD;  Location: [facility] OR [address] [place] [address];  Service: Urology;  Laterality: N/A;    INJECTION OF BOTULINUM TOXIN TYPE A N/A 8/26/2019    Procedure: INJECTION, BOTULINUM TOXIN, TYPE A 300 UNITS;  Surgeon: Ronel Whittington MD;  Location: [facility] OR [address] [place] [address];  Service: Urology;  Laterality: N/A;    MASTECTOMY Left 8/1/2019    Procedure: MASTECTOMY 23 hour stay;  Surgeon: Samia Fulton MD;  Location: [facility] OR 2ND FLR;  Service: General;  Laterality: Left;    MEDIPORT REMOVAL Right 6/24/2022    Procedure: REMOVAL, CATHETER, CENTRAL VENOUS, TUNNELED, WITH PORT;  Surgeon: Isauro Anderson MD;   Location: [facility] CATH LAB;  Service: Radiology;  Laterality: Right;    OVARIAN CYST SURGERY  1985    REPLACEMENT OF STENT Left 12/23/2021    Procedure: REPLACEMENT, STENT;  Surgeon: Ronel Whittington MD;  Location: [facility] OR 1ST FLR;  Service: Urology;  Laterality: Left;    REPLACEMENT OF STENT Left 2/18/2022    Procedure: REPLACEMENT, STENT;  Surgeon: Ronel Whittington MD;  Location: [facility] OR 1ST FLR;  Service: Urology;  Laterality: Left;    RETROGRADE PYELOGRAPHY Left 2/18/2022    Procedure: PYELOGRAM, RETROGRADE;  Surgeon: Ronel Whittington MD;  Location: [facility] OR 1ST FLR;  Service: Urology;  Laterality: Left;    SENTINEL LYMPH NODE BIOPSY Left 8/1/2019    Procedure: BIOPSY, LYMPH NODE, SENTINEL;  Surgeon: Samia Fulton MD;  Location: [facility] OR 2ND FLR;  Service: General;  Laterality: Left;    spt placement      TONSILLECTOMY, ADENOIDECTOMY      TOTAL ABDOMINAL HYSTERECTOMY W/ BILATERAL SALPINGOOPHORECTOMY  1985    URETEROSCOPY Left 2/18/2022    Procedure: URETEROSCOPY;  Surgeon: Ronel Whittington MD;  Location: [facility] OR [address] [place] [address];  Service: Urology;  Laterality: Left;       Review of patient's allergies indicates:  No Known Allergies  Current Facility-Administered Medications   Medication Frequency    0.9%  NaCl infusion Once    acetaminophen tablet 650 mg Q6H PRN    anastrozole tablet 1 mg Daily    apixaban tablet 5 mg BID    atorvastatin tablet 80 mg Daily    carvediloL tablet 6.25 mg BID    ergocalciferol capsule 50,000 Units Q7 Days    furosemide tablet 40 mg Daily    glucagon (human recombinant) injection 1 mg PRN    glucose chewable tablet 16 g PRN    glucose chewable tablet 24 g PRN    hydrALAZINE tablet 50 mg Q8H PRN    HYDROcodone-acetaminophen  mg per tablet 1 tablet Q6H PRN    insulin aspart U-100 pen 0-5 Units QID (AC + HS) PRN    insulin detemir U-100 (Levemir) pen 8 Units QHS    isosorbide mononitrate 24 hr tablet 30 mg Daily    levothyroxine tablet 50 mcg  Before breakfast    magnesium oxide tablet 400 mg Daily    melatonin tablet 6 mg Nightly PRN    methocarbamoL tablet 1,000 mg TID    naloxone 0.4 mg/mL injection 0.02 mg PRN    NIFEdipine 24 hr tablet 90 mg Daily    oxybutynin 24 hr tablet 10 mg Daily    polyethylene glycol packet 17 g BID PRN    prochlorperazine injection Soln 5 mg Q6H PRN    senna-docusate 8.6-50 mg per tablet 1 tablet BID    sevelamer carbonate tablet 800 mg TID WM    sodium chloride 0.9% flush 10 mL PRN    sumatriptan tablet 100 mg BID PRN    traZODone tablet 100 mg QHS     Family History       Problem Relation (Age of Onset)    ALS Mother    Cancer Maternal Grandmother, Paternal Grandfather, Brother    Diabetes Sister, Maternal Aunt, Maternal Uncle    Kidney disease Sister, Maternal Aunt    Prostate cancer Brother    Scoliosis Brother          Tobacco Use    Smoking status: Never    Smokeless tobacco: Never   Substance and Sexual Activity    Alcohol use: No     Alcohol/week: 0.0 standard drinks of alcohol    Drug use: No    Sexual activity: Not Currently     Birth control/protection: Abstinence     Review of Systems   Constitutional: Negative.    HENT: Negative.     Respiratory:  Negative for shortness of breath.    Cardiovascular:  Positive for leg swelling. Negative for chest pain.   Gastrointestinal: Negative.    Genitourinary: Negative.    Musculoskeletal: Negative.    Skin: Negative.      Objective:     Vital Signs (Most Recent):  Temp: 98 °F (36.7 °C) (08/18/23 1130)  Pulse: 61 (08/18/23 1130)  Resp: 20 (08/18/23 1130)  BP: (!) 164/74 (08/18/23 1130)  SpO2: 99 % (08/18/23 1130) Vital Signs (24h Range):  Temp:  [98 °F (36.7 °C)-98.6 °F (37 °C)] 98 °F (36.7 °C)  Pulse:  [57-98] 61  Resp:  [10-23] 20  SpO2:  [95 %-100 %] 99 %  BP: (148-219)/(65-97) 164/74     Weight: 125.4 kg (276 lb 7.3 oz) (08/17/23 2025)  Body mass index is 42.04 kg/m².  Body surface area is 2.45 meters squared.    I/O last 3 completed shifts:  In: -    Out: 2550 [Urine:2550]     Physical Exam  Vitals and nursing note reviewed.   Constitutional:       General: She is not in acute distress.     Appearance: She is obese. She is not toxic-appearing or diaphoretic.   HENT:      Head: Normocephalic and atraumatic.      Nose: Nose normal.      Mouth/Throat:      Mouth: Mucous membranes are moist.   Eyes:      Pupils: Pupils are equal, round, and reactive to light.   Cardiovascular:      Rate and Rhythm: Normal rate and regular rhythm.      Pulses: Normal pulses.      Heart sounds: No murmur heard.  Pulmonary:      Effort: Pulmonary effort is normal. No respiratory distress.      Breath sounds: No wheezing, rhonchi or rales.      Comments: Currently on room air  Chest:      Comments: R chest wall tunneled catheter; no surrounding erythema, drainage, or swelling  Abdominal:      General: Bowel sounds are normal. There is no distension.      Palpations: Abdomen is soft.      Tenderness: There is no abdominal tenderness. There is no guarding.      Comments: Suprapubic catheter in place draining hazy, sediment filled yellow urine   Musculoskeletal:         General: No tenderness or deformity. Normal range of motion.      Cervical back: Normal range of motion.      Right lower leg: Edema present.      Left lower leg: Edema present.   Skin:     General: Skin is warm and dry.      Capillary Refill: Capillary refill takes less than 2 seconds.      Comments: Dry flaky skin noted to BLE   Neurological:      General: No focal deficit present.      Mental Status: She is alert and oriented to person, place, and time.      Sensory: No sensory deficit.      Motor: Weakness (BLE) present.   Psychiatric:         Mood and Affect: Mood normal.         Behavior: Behavior normal.          Significant Labs:  CBC:   Recent Labs   Lab 08/18/23 0522   WBC 7.97   RBC 2.58*   HGB 7.0*   HCT 23.4*      MCV 91   MCH 27.1   MCHC 29.9*     CMP:   Recent Labs   Lab 08/18/23 0522   GLU 88    CALCIUM 8.0*   ALBUMIN 2.1*   PROT 4.9*   *   K 4.6   CO2 23   CL 99   BUN 46*   CREATININE 3.1*   ALKPHOS 96   ALT 8*   AST 11   BILITOT 0.2     All labs within the past 24 hours have been reviewed.      Assessment/Plan:     Renal/  * Volume overload  -UF with HD    CKD (chronic kidney disease) stage V requiring chronic dialysis  Patient with CKD5 (Cr Bl 2.2) previously on dialysis, has not undergone HD in several months. Pt was recently hospitalized earlier this month when she was sent to the ED by her PCP due to bicarb < 5. On admission, bicarb was 7, Cr was 4.7. Patient was started on bicarb drip with increase in bicarb to 13. VBG with pH 7.2. Etiology ZAK vs progression of CKD. Reinitiated HD on 8/8/23. 3rd HD session  8/10. Good residual renal function on lasix       Recommendations:      -HD today 8/18 for metabolic clearance and volume management.  -Ok to discharge following HD. May only need HD biweekly.  Pt has outpatient HD chair [facility], next HD session 8/21.    -Continue lasix 40 PO daily   -Fluid restriction to 1.5L  - , vitamin D 15, agree with starting supplementation  - Continue sevelemer 800 TID AC  - Renally dose meds  - Keep blood pressure as stable as possible, will monitor  - Avoid contrast with imaging   - Strict I/Os        Thank you for your consult. I will continue to follow up with patient. Please contact us if you have any additional questions.    Lamar Negro, MUNA  Nephrology  [facility] - Observation 11H

## 2023-08-18 NOTE — ASSESSMENT & PLAN NOTE
Acute kidney injury superimposed on CKD  Cr 3.0 on admit, previous Cr bl ~2.2  -Reinitiated on HD during last admit 8/6-8/11 for acute on chronic kidney injury  -Last dialyzed 8/9, scheduled MWF but missed last 2 sessions due to transportation issues  -Endorses BLE edema;  and CXR in process. Currently on room air.   -> CXR with coarse interstitial attenuation bilaterally, underlying edema not excluded. Otherwise stable.  -Nephrology consulted in the ED, appreciate assistance.  -Continue Chronic hemodialysis, session completed this afternoon.  -Monitor daily electrolytes and defer dialysis orders to nephrology.  -Renal diet, 1.5L FR  -Daily wt/ strict I&Os  -Renally dose medications  -Continue phosphorus binders  - resume HD outpatient at [facility]. CM confirmed transportation and HD chair.

## 2023-08-18 NOTE — ED NOTES
Telemetry Verification   Patient placed on Telemetry Box  Verified with War Room  Box # [id number]   Monitor Tech Kaleia   Rate 77   Rhythm NSR

## 2023-08-18 NOTE — PROGRESS NOTES
HD Tx completed.  Net fluid removed is 1 liter.  VSS.  Tolerated dialysis TX.  Report given to primary nurse.

## 2023-08-18 NOTE — ASSESSMENT & PLAN NOTE
Patient with CKD5 (Cr Bl 2.2) previously on dialysis, has not undergone HD in several months. Pt was recently hospitalized earlier this month when she was sent to the ED by her PCP due to bicarb < 5. On admission, bicarb was 7, Cr was 4.7. Patient was started on bicarb drip with increase in bicarb to 13. VBG with pH 7.2. Etiology ZAK vs progression of CKD. Reinitiated HD on 8/8/23. Good residual renal function on lasix      Recommendations:      -Pt initiated HD this week, 3rd session on 8/10. No indication for RRT today. Will eval for HD tomorrow 8/12. May only need HD biweekly   -Continue lasix 40 PO daily   - Can increase fluid restriction to 1.5L  - , vitamin D 15, agree with starting supplementation  - Continue sevelemer 800 TID AC  - Renally dose meds  - Keep blood pressure as stable as possible, will monitor  - Avoid contrast with imaging   - Strict I/Os

## 2023-08-18 NOTE — PLAN OF CARE
CHRIS received message to contact Abeba with [facility] [phone] (opt 2).  CHRIS spoke with Abeba and discussed pt's inability to attend any of her dialysis appointments this past week as a new pt with a new chair.  Pt was having transportation issues with her health insurance transportation and that was the reason she could not make her appointments.    CHRIS asked if pt still has her chair at [facility] and Abeba could not give a definitive answer, given transportation issues.        CHRIS/CASSIUS to follow-up with pt transportation and dialysis      Ghada Rebolledo CD, MSW, LMSW, RSW   Case Management  [facility]  Email: [email or web address]

## 2023-08-18 NOTE — HPI
Cecile Bowen is a  73 y.o. female with a PMHx of HTN, HLD, hypothyroidism, a fib on eliquis, DM2, obesity, VIJAYA, chronic pain, neurogenic bladder with suprapubic catheter, and CKD5 on HD who presents to the ED for missed HD sessions. Of note the patient was recently admitted to [facility] 8/6-8/11 for acute on chronic CKD and was reinitiated on HD due to worsening kidney function and metabolic acidosis. The patient was last dialyzed 8/9. She states she was scheduled to start HD on Monday but missed HD on Monday and Wednesday due to transportation issues. The patient endorses BLE edema that began about 5 months ago and is relatively unchanged. She denies shortness of breath or orthopnea. The patient does make urine. She notes a mild intermittent cough with yellow phlegm. The patient denies chest pain, fever, N/V/D, abdominal pain, or hematuria. She endorses chronic issues with migraines, constipation, and back pain which are at baseline. The patient is wheelchair bound at baseline and uses a christina lift to transfer.     In the ED, patient hypertensive but otherwise vitals stable, afebrile. CBC with stable anemia. Na 128 (bl ~130). Bicarb 22. Cr 3.0 (bl~2.2). Glucose 114. Calcium 8.3. Lactate 0.6. . pH 7.26. EKG shows SR, 74 bpm, no acute ischemic changes.     HPI obtained via EMR and patient interview

## 2023-08-18 NOTE — DISCHARGE SUMMARY
[facility] - Observation [address] [place] [address] Medicine  Discharge Summary      Patient Name: Cecile Bowen  MRN: [id number]  LISA: [id number]  Patient Class: OP- Observation  Admission Date: 8/17/2023  Hospital Length of Stay: 0 days  Discharge Date and Time:  08/18/2023 2:44 PM  Attending Physician: Morris Stephens MD   Discharging Provider: Hetal Aguilar PA-C  Primary Care Provider: Lurdes Navarro MD  Hospital Medicine Team: [facility] HOSP MED E Hetal Aguilar PA-C  Primary Care Team: [facility] HOSP MED E    HPI:   Cecile Bowen is a  73 y.o. female with a PMHx of HTN, HLD, hypothyroidism, a fib on eliquis, DM2, obesity, VIJAYA, chronic pain, neurogenic bladder with suprapubic catheter, and CKD4 on HD who presents to the ED for missed HD sessions. Of note the patient was recently admitted to [facility] 8/6-8/11 for acute on chronic CKD and was reinitiated on HD due to worsening kidney function and metabolic acidosis. The patient was last dialyzed 8/9. She states she was scheduled to start HD on Monday but missed HD on Monday and Wednesday due to transportation issues. The patient endorses BLE edema that began about 5 months ago and is relatively unchanged. She denies shortness of breath or orthopnea. The patient does make urine. She notes a mild intermittent cough with yellow phlegm. The patient denies chest pain, fever, N/V/D, abdominal pain, or hematuria. She endorses chronic issues with migraines, constipation, and back pain which are at baseline. The patient is wheelchair bound at baseline and uses a christina lift to transfer.    In the ED, patient hypertensive but otherwise vitals stable, afebrile. CBC with stable anemia. Na 128 (bl ~130). Bicarb 22. Cr 3.0 (bl~2.2). Glucose 114. Calcium 8.3. Lactate 0.6. . pH 7.26. EKG shows SR, 74 bpm, no acute ischemic changes. CXR in process. Nephrology was consulted in the ED for HD.      * No surgery found *      Hospital Course:   Cecile Bowen was admitted to hospital  medicine for volume overload hyperphosphatemia. She was supposed to have restarted dialysis this week but has had multiple issues with her insurance-provided transportation service. CM reached out to transportation, confirmed patient is scheduled for transport to HD on Monday. Also confirmed that she will have a chair for regularly scheduled HD sessions at [facility]. Nephrology consulted during admission, patient received dialysis.  She is medically ready for discharge home. Discharge plan discussed and patient expressed understanding. All questions answered.          Goals of Care Treatment Preferences:  Code Status: Full Code    Health care agent: No value filed.  Health care agent number: No value filed.                   Consults:   Consults (From admission, onward)        Status Ordering Provider     Inpatient consult to Social Work  Once        Provider:  (Not yet assigned)    Completed JOSE CARLOS FULTON     Inpatient consult to Nephrology  Once        Provider:  (Not yet assigned)    Completed SAMANTHA LUNDY          Renal/  * Volume overload  Acute kidney injury superimposed on CKD  Cr 3.0 on admit, previous Cr bl ~2.2  -Reinitiated on HD during last admit 8/6-8/11 for acute on chronic kidney injury  -Last dialyzed 8/9, scheduled MWF but missed last 2 sessions due to transportation issues  -Endorses BLE edema;  and CXR in process. Currently on room air.   -> CXR with coarse interstitial attenuation bilaterally, underlying edema not excluded. Otherwise stable.  -Nephrology consulted in the ED, appreciate assistance.  -Continue Chronic hemodialysis, session completed this afternoon.  -Monitor daily electrolytes and defer dialysis orders to nephrology.  -Renal diet, 1.5L FR  -Daily wt/ strict I&Os  -Renally dose medications  -Continue phosphorus binders  - resume HD outpatient at [facility]. CASSIUS confirmed transportation and HD chair.     Hyponatremia  -Chronic hyponatremia 130 baseline, 128 on admission ->  improved on repeat   -Likely worsened secondary to fluid overload  -Nephro consulted for HD  The patient's sodium results have been reviewed and are listed below.  Recent Labs   Lab 08/18/23  0522   *         Final Active Diagnoses:    Diagnosis Date Noted POA    PRINCIPAL PROBLEM:  Volume overload [E87.70] 08/17/2023 Yes    CKD (chronic kidney disease) stage V requiring chronic dialysis [N18.6, Z99.2] 02/11/2022 Not Applicable    Anemia of chronic disease [D63.8] 01/25/2022 Yes    Chronic kidney disease-mineral and bone disorder [N18.9, E83.9, M89.9] 01/25/2022 Yes    Hypertension associated with diabetes [E11.59, I15.2] 01/24/2022 Yes     Chronic    A-fib [I48.91] 11/29/2021 Yes     Chronic    Acute kidney injury superimposed on CKD [N17.9, N18.9] 11/28/2021 Yes    Type 2 diabetes mellitus with stage 4 chronic kidney disease, with long-term current use of insulin [E11.22, N18.4, Z79.4] 02/07/2020 Not Applicable     Chronic    Cervicogenic migraine [G43.809] 02/01/2019 Yes     Chronic    Chronic pain [G89.29] 03/15/2018 Yes     Chronic    Morbid obesity due to excess calories [E66.01] 01/09/2017 Yes     Chronic    Suprapubic catheter [Z93.59] 09/19/2016 Not Applicable     Chronic    Major depressive disorder, recurrent episode, moderate [F33.1] 03/10/2016 Yes     Chronic    Neurogenic bladder [N31.9] 12/14/2015 Yes     Chronic    Hyponatremia [E87.1] 09/22/2014 Yes     Chronic    VIJAYA (obstructive sleep apnea) [G47.33] 03/17/2014 Yes     Chronic    Hyperlipidemia associated with type 2 diabetes mellitus [E11.69, E78.5] 03/11/2014 Yes     Chronic    Fibromyalgia [M79.7] 12/17/2012 Yes     Chronic    Hypothyroidism [E03.9] 12/12/2012 Yes     Chronic      Problems Resolved During this Admission:       Discharged Condition: good    Disposition:     Follow Up:   Follow-up Information     [facility] Call.    Why: Contact at discharge to confirm transportation  time for  "dialysis on Monday, Aug. 21st.  time is scheduled for 1:30PM.  Contact information:  [phone]                     Patient Instructions:   No discharge procedures on file.    Significant Diagnostic Studies: N/A    Pending Diagnostic Studies:     None         Medications:  Reconciled Home Medications:      Medication List      ASK your doctor about these medications    AIMOVIG AUTOINJECTOR 140 mg/mL autoinjector  Generic drug: erenumab-aooe  Inject 1 mL (140 mg total) into the skin every 30 days.     amLODIPine 5 MG tablet  Commonly known as: NORVASC  Take 5 mg by mouth once daily.     anastrozole 1 mg Tab  Commonly known as: ARIMIDEX  Take 1 tablet (1 mg total) by mouth once daily.     apixaban 5 mg Tab  Commonly known as: ELIQUIS  Take 1 tablet (5 mg total) by mouth 2 (two) times daily.     atorvastatin 80 MG tablet  Commonly known as: LIPITOR  Take 1 tablet (80 mg total) by mouth once daily.     BD INSULIN SYRINGE ULTRA-FINE 0.5 mL 31 gauge x 5/16" Syrg  Generic drug: insulin syringe-needle U-100  USE WITH INSULIN 4 TIMES A DAY     calcium acetate(phosphat bind) 667 mg tablet  Commonly known as: PHOSLO  Take 1 tablet (667 mg total) by mouth 3 (three) times daily with meals.     carvediloL 6.25 MG tablet  Commonly known as: COREG  Take 1 tablet (6.25 mg total) by mouth 2 (two) times daily.     DULoxetine 20 MG capsule  Commonly known as: CYMBALTA  Take 20 mg by mouth 2 (two) times daily.     ergocalciferol 50,000 unit Cap  Commonly known as: ERGOCALCIFEROL  TAKE ONE CAPSULE BY MOUTH ONE TIME PER WEEK, TAKES ON TUESDAYS     furosemide 40 MG tablet  Commonly known as: LASIX  Take 1 tablet (40 mg total) by mouth once daily.     HYDROcodone-acetaminophen  mg per tablet  Commonly known as: NORCO  Take 1 tablet by mouth every 6 (six) hours as needed for Pain.     insulin glargine 100 units/mL SubQ pen  Commonly known as: LANTUS SOLOSTAR U-100 INSULIN  INJECT 14-15 UNITS UNDER THE SKIN ONCE DAILY   " "  isosorbide mononitrate 30 MG 24 hr tablet  Commonly known as: IMDUR  Take 1 tablet (30 mg total) by mouth once daily.     magnesium oxide 400 mg (241.3 mg magnesium) tablet  Commonly known as: MAG-OX  Take 1 tablet (400 mg total) by mouth once daily.     methocarbamoL 750 MG Tab  Commonly known as: ROBAXIN  TAKE 1-2 TABLETS (750-1,500 MG TOTAL) BY MOUTH 3 (THREE) TIMES DAILY AS NEEDED (MUSCLE SPASMS).     NIFEdipine 90 MG (OSM) 24 hr tablet  Commonly known as: PROCARDIA-XL  Take 1 tablet (90 mg total) by mouth once daily.     oxybutynin 10 MG 24 hr tablet  Commonly known as: DITROPAN-XL  Take 1 tablet (10 mg total) by mouth once daily.     pen needle, diabetic 31 gauge x 5/16" Ndle  Commonly known as: BD ULTRA-FINE SHORT PEN NEEDLE  USE WITH LANTUS DAILY, e 11.65     sodium bicarbonate 650 MG tablet  Take 1 tablet (650 mg total) by mouth 3 (three) times daily.     sumatriptan 100 MG tablet  Commonly known as: IMITREX  Take 1 tablet (100 mg total) by mouth 2 (two) times daily as needed for Migraine.     SYNTHROID 50 MCG tablet  Generic drug: levothyroxine  TAKE 1 TABLET BY MOUTH BEFORE BREAKFAST. ADMINISTER ON AN EMPTY STOMACH AT LEAST 30 MINUTES BEFORE FOOD. IF RECEIVING TUBE FEEDS, HOLD TUBE FEEDS FOR 1 HOUR BEFORE AND AFTER LEVOTHYROXINE ADMINISTRATION.     traZODone 100 MG tablet  Commonly known as: DESYREL  TAKE 1 TABLET BY MOUTH NIGHTLY AS NEEDED FOR INSOMNIA.            Indwelling Lines/Drains at time of discharge:   Lines/Drains/Airways     Central Venous Catheter Line  Duration                Hemodialysis Catheter right subclavian -- days         Hemodialysis Catheter 08/08/23 0827 right subclavian;right internal jugular 10 days          Drain  Duration                Suprapubic Catheter 02/24/22 1200 latex 16 Fr. 540 days                Time spent on the discharge of patient: 36 minutes         Hetal Aguilar PA-C  Department of Hospital Medicine  [facility] - Observation 11H  "

## 2023-08-18 NOTE — ASSESSMENT & PLAN NOTE
Patient with CKD5 (Cr Bl 2.2) previously on dialysis, has not undergone HD in several months. Pt was recently hospitalized earlier this month when she was sent to the ED by her PCP due to bicarb < 5. On admission, bicarb was 7, Cr was 4.7. Patient was started on bicarb drip with increase in bicarb to 13. VBG with pH 7.2. Etiology ZAK vs progression of CKD. Reinitiated HD on 8/8/23. 3rd HD session  8/10. Good residual renal function on lasix       Recommendations:      -HD today 8/18 for metabolic clearance and volume management.  -Ok to discharge following HD. May only need HD biweekly.  Pt has outpatient HD chair OK, next HD session 8/21.    -Continue lasix 40 PO daily   -Fluid restriction to 1.5L  - , vitamin D 15, agree with starting supplementation  - Continue sevelemer 800 TID AC  - Renally dose meds  - Keep blood pressure as stable as possible, will monitor  - Avoid contrast with imaging   - Strict I/Os

## 2023-08-19 NOTE — PLAN OF CARE
SW generated ADT 30 to arrange transport for pt home.         Poncho Miller LMSW  Case Management  Emergency Department  [phone]

## 2023-08-19 NOTE — PLAN OF CARE
Patient discharged to home with [facility] ambulance service via stretcher. Discharged instruction given. Patient verbalized understanding. Patient left with all her personal belonging. No acute distress noted.

## 2023-08-20 VITALS
TEMPERATURE: 98 F | RESPIRATION RATE: 18 BRPM | DIASTOLIC BLOOD PRESSURE: 98 MMHG | OXYGEN SATURATION: 96 % | HEART RATE: 82 BPM | SYSTOLIC BLOOD PRESSURE: 210 MMHG

## 2023-08-22 DIAGNOSIS — G89.29 CHRONIC NECK PAIN: ICD-10-CM

## 2023-08-22 DIAGNOSIS — M79.7 FIBROMYALGIA: ICD-10-CM

## 2023-08-22 DIAGNOSIS — M54.2 CHRONIC NECK PAIN: ICD-10-CM

## 2023-08-22 DIAGNOSIS — G89.29 CHRONIC MIDLINE LOW BACK PAIN WITHOUT SCIATICA: ICD-10-CM

## 2023-08-22 DIAGNOSIS — M54.50 CHRONIC MIDLINE LOW BACK PAIN WITHOUT SCIATICA: ICD-10-CM

## 2023-08-22 NOTE — PLAN OF CARE
[facility] [address] - Observ[facility] 11H  Discharge Final Note    Primary Care Provider: Lurdes Navarro MD    Expected Discharge Date: 8/18/2023    Patient discharged to home via ambulance transportation.     Patient's bedside nurse and patient notified of the above.      Final Discharge Note (most recent)       Final Note - 08/22/23 0922          Final Note    Assessment Type Final Discharge Note (P)      Anticipated Discharge Disposition Home or Self Care (P)         Post-Acute Status    Post-Acute Authorization Other (P)      Other Status No Post-Acute Service Needs (P)                      Important Message from Medicare             Contact Info       [employer]    [phone]       Next Steps: Call    Instructions: Contact at discharge to confirm transportation  time for dialysis on Monday, Aug. 21st.  time is scheduled for 1:30PM.            Future Appointments   Date Time Provider Department Center   8/23/2023  2:00 PM CHAIR 26, [facility]   8/25/2023  9:10 AM LAB, [facility]  [address] [facility]   8/25/2023  2:00 PM CHAIR 21, [facility]   8/28/2023 10:30 AM CHAIR 24, [facility]   8/28/2023  2:00 PM Brooke Gabriel APRN, JOCELINEP [facility] IM [address] PC[facility]   8/29/2023  1:00 PM Catherine Mccartney, NP [facility] IM [place]   8/30/2023 10:40 AM Tesha Stafford MD [facility] PHYSMED [address]   8/30/2023  2:00 PM CHAIR 21, [facility]   9/1/2023  8:00 AM Rylee Cheng NP [address]   9/1/2023  2:00 PM CHAIR 21, [facility]   9/7/2023  2:20 PM PRIV PRE-ADMIT, ENDO -[facility] [address] [facility]   10/11/2023  8:45 AM LALITO Maria II, MD [facility] VEIN [facility]   10/26/2023 11:15 AM Regan Brock [facility] POD [facility] [address] [facility]   1/11/2024  2:00 PM Gavin Zuluaga,  [facility] EMILIO [facility]       SW  scheduled post-discharge follow-up appointment and information added to AVS.     Anna Castellon LMSW  [facility]  Ext. [phone]

## 2023-08-23 RX ORDER — METHOCARBAMOL 750 MG/1
TABLET, FILM COATED ORAL
Qty: 180 TABLET | Refills: 1 | Status: SHIPPED | OUTPATIENT
Start: 2023-08-23 | End: 2023-10-20

## 2023-08-25 ENCOUNTER — DOCUMENT SCAN (OUTPATIENT)
Dept: HOME HEALTH SERVICES | Facility: HOSPITAL | Age: 73
End: 2023-08-25
Payer: MEDICARE

## 2023-08-28 ENCOUNTER — TELEPHONE (OUTPATIENT)
Dept: INTERNAL MEDICINE | Facility: CLINIC | Age: 73
End: 2023-08-28
Payer: MEDICARE

## 2023-08-28 NOTE — TELEPHONE ENCOUNTER
----- Message from Lorraine Warren sent at 8/28/2023  2:59 PM CDT -----  Contact: [phone] @ patient  Good afternoon patient would like to let the nurse know that she can not make it to the apt tomorrow and would like to get another apt on Tue or Thurs only. Please give patient a call back [phone]

## 2023-08-28 NOTE — TELEPHONE ENCOUNTER
----- Message from DIANN Velez, PRUDENCE sent at 8/28/2023  4:08 PM CDT -----  Regarding: audio  Hi!  Offer audio on a Wednesday for pt  Thanks  Brooke

## 2023-08-29 ENCOUNTER — LAB VISIT (OUTPATIENT)
Dept: LAB | Facility: HOSPITAL | Age: 73
End: 2023-08-29
Attending: INTERNAL MEDICINE
Payer: MEDICARE

## 2023-08-29 ENCOUNTER — DOCUMENT SCAN (OUTPATIENT)
Dept: HOME HEALTH SERVICES | Facility: HOSPITAL | Age: 73
End: 2023-08-29
Payer: MEDICARE

## 2023-08-29 DIAGNOSIS — I48.91 ATRIAL FIBRILLATION: Primary | ICD-10-CM

## 2023-08-29 LAB
ALBUMIN SERPL BCP-MCNC: 2.4 G/DL (ref 3.5–5.2)
ALP SERPL-CCNC: 111 U/L (ref 55–135)
ALT SERPL W/O P-5'-P-CCNC: 9 U/L (ref 10–44)
ANION GAP SERPL CALC-SCNC: 9 MMOL/L (ref 8–16)
AST SERPL-CCNC: 11 U/L (ref 10–40)
BASOPHILS # BLD AUTO: 0.04 K/UL (ref 0–0.2)
BASOPHILS NFR BLD: 0.6 % (ref 0–1.9)
BILIRUB SERPL-MCNC: 0.2 MG/DL (ref 0.1–1)
BUN SERPL-MCNC: 16 MG/DL (ref 8–23)
CALCIUM SERPL-MCNC: 8 MG/DL (ref 8.7–10.5)
CHLORIDE SERPL-SCNC: 97 MMOL/L (ref 95–110)
CO2 SERPL-SCNC: 24 MMOL/L (ref 23–29)
CREAT SERPL-MCNC: 1.8 MG/DL (ref 0.5–1.4)
DIFFERENTIAL METHOD: ABNORMAL
EOSINOPHIL # BLD AUTO: 0.3 K/UL (ref 0–0.5)
EOSINOPHIL NFR BLD: 3.9 % (ref 0–8)
ERYTHROCYTE [DISTWIDTH] IN BLOOD BY AUTOMATED COUNT: 15.7 % (ref 11.5–14.5)
EST. GFR  (NO RACE VARIABLE): 29.8 ML/MIN/1.73 M^2
GLUCOSE SERPL-MCNC: 171 MG/DL (ref 70–110)
HCT VFR BLD AUTO: 23.2 % (ref 37–48.5)
HGB BLD-MCNC: 7.5 G/DL (ref 12–16)
IMM GRANULOCYTES # BLD AUTO: 0.06 K/UL (ref 0–0.04)
IMM GRANULOCYTES NFR BLD AUTO: 0.8 % (ref 0–0.5)
LYMPHOCYTES # BLD AUTO: 1.6 K/UL (ref 1–4.8)
LYMPHOCYTES NFR BLD: 21.6 % (ref 18–48)
MCH RBC QN AUTO: 28.3 PG (ref 27–31)
MCHC RBC AUTO-ENTMCNC: 32.3 G/DL (ref 32–36)
MCV RBC AUTO: 88 FL (ref 82–98)
MONOCYTES # BLD AUTO: 0.5 K/UL (ref 0.3–1)
MONOCYTES NFR BLD: 7.1 % (ref 4–15)
NEUTROPHILS # BLD AUTO: 4.7 K/UL (ref 1.8–7.7)
NEUTROPHILS NFR BLD: 66 % (ref 38–73)
NRBC BLD-RTO: 0 /100 WBC
PLATELET # BLD AUTO: 267 K/UL (ref 150–450)
PMV BLD AUTO: 9.4 FL (ref 9.2–12.9)
POTASSIUM SERPL-SCNC: 3.8 MMOL/L (ref 3.5–5.1)
PROT SERPL-MCNC: 5 G/DL (ref 6–8.4)
RBC # BLD AUTO: 2.65 M/UL (ref 4–5.4)
SODIUM SERPL-SCNC: 130 MMOL/L (ref 136–145)
WBC # BLD AUTO: 7.17 K/UL (ref 3.9–12.7)

## 2023-08-29 PROCEDURE — 80053 COMPREHEN METABOLIC PANEL: CPT | Mod: HCNC | Performed by: INTERNAL MEDICINE

## 2023-08-29 PROCEDURE — 85025 COMPLETE CBC W/AUTO DIFF WBC: CPT | Mod: HCNC | Performed by: INTERNAL MEDICINE

## 2023-08-31 ENCOUNTER — DOCUMENT SCAN (OUTPATIENT)
Dept: HOME HEALTH SERVICES | Facility: HOSPITAL | Age: 73
End: 2023-08-31
Payer: MEDICARE

## 2023-09-01 ENCOUNTER — TELEPHONE (OUTPATIENT)
Dept: INTERNAL MEDICINE | Facility: CLINIC | Age: 73
End: 2023-09-01
Payer: MEDICARE

## 2023-09-01 NOTE — TELEPHONE ENCOUNTER
----- Message from Lurdes Navarro MD sent at 8/31/2023  3:58 PM CDT -----  Your lab has resulted, and your glucoses elevated and your kidney function has slightly improved.  You have chronic anemia and these values will be monitored by your dialysis team.  shadia

## 2023-09-05 ENCOUNTER — TELEPHONE (OUTPATIENT)
Dept: PHYSICAL MEDICINE AND REHAB | Facility: CLINIC | Age: 73
End: 2023-09-05

## 2023-09-07 ENCOUNTER — CLINICAL SUPPORT (OUTPATIENT)
Dept: ENDOSCOPY | Facility: HOSPITAL | Age: 73
End: 2023-09-07
Payer: MEDICARE

## 2023-09-07 ENCOUNTER — TELEPHONE (OUTPATIENT)
Dept: ENDOSCOPY | Facility: HOSPITAL | Age: 73
End: 2023-09-07

## 2023-09-07 VITALS — HEIGHT: 68 IN | WEIGHT: 250 LBS | BODY MASS INDEX: 37.89 KG/M2

## 2023-09-07 DIAGNOSIS — Z12.11 COLON CANCER SCREENING: ICD-10-CM

## 2023-09-07 DIAGNOSIS — N18.6 ESRD (END STAGE RENAL DISEASE): Primary | ICD-10-CM

## 2023-09-07 NOTE — TELEPHONE ENCOUNTER
Spoke to patient to schedule procedure(s) Colonoscopy       Physician to perform procedure(s) Dr. JAUN Hooker  Date of Procedure (s) 12/7/23  Arrival Time 12:15 PM  Time of Procedure(s) 1:15 PM   Location of Procedure(s) [place] 4th Floor  Type of Rx Prep sent to patient: PEG  Instructions provided to patient via Postal Mail    Patient was informed on the following information and verbalized understanding. Screening questionnaire reviewed with patient and complete. If procedure requires anesthesia, a responsible adult needs to be present to accompany the patient home, patient cannot drive after receiving anesthesia. Appointment details are tentative, especially check-in time. Patient will receive a prep-op call 4 days prior to confirm check-in time for procedure. If applicable the patient should contact their pharmacy to verify Rx for procedure prep is ready for pick-up. Patient was advised to call the scheduling department at [phone] if pharmacy states no Rx is available. Patient was advised to call the endoscopy scheduling department if any questions or concerns arise.      [facility] Scheduling Department

## 2023-09-09 ENCOUNTER — PATIENT MESSAGE (OUTPATIENT)
Dept: PHYSICAL MEDICINE AND REHAB | Facility: CLINIC | Age: 73
End: 2023-09-09
Payer: MEDICARE

## 2023-09-11 DIAGNOSIS — G43.711 INTRACTABLE CHRONIC MIGRAINE WITHOUT AURA AND WITH STATUS MIGRAINOSUS: ICD-10-CM

## 2023-09-11 RX ORDER — HYDROCODONE BITARTRATE AND ACETAMINOPHEN 10; 325 MG/1; MG/1
1 TABLET ORAL EVERY 6 HOURS PRN
Qty: 28 TABLET | Refills: 0 | Status: SHIPPED | OUTPATIENT
Start: 2023-09-11 | End: 2023-09-14 | Stop reason: SDUPTHER

## 2023-09-11 RX ORDER — ERENUMAB-AOOE 140 MG/ML
140 INJECTION, SOLUTION SUBCUTANEOUS
Qty: 1 ML | Refills: 2 | Status: SHIPPED | OUTPATIENT
Start: 2023-09-11 | End: 2024-01-16 | Stop reason: SDUPTHER

## 2023-09-12 ENCOUNTER — LAB VISIT (OUTPATIENT)
Dept: LAB | Facility: HOSPITAL | Age: 73
End: 2023-09-12
Attending: INTERNAL MEDICINE
Payer: MEDICARE

## 2023-09-12 DIAGNOSIS — N31.9 HIGH COMPLIANCE BLADDER: Primary | ICD-10-CM

## 2023-09-12 LAB
ALBUMIN SERPL BCP-MCNC: 2.7 G/DL (ref 3.5–5.2)
ALP SERPL-CCNC: 116 U/L (ref 55–135)
ALT SERPL W/O P-5'-P-CCNC: 8 U/L (ref 10–44)
ANION GAP SERPL CALC-SCNC: 12 MMOL/L (ref 8–16)
AST SERPL-CCNC: 12 U/L (ref 10–40)
BASOPHILS # BLD AUTO: 0.02 K/UL (ref 0–0.2)
BASOPHILS NFR BLD: 0.3 % (ref 0–1.9)
BILIRUB SERPL-MCNC: 0.3 MG/DL (ref 0.1–1)
BUN SERPL-MCNC: 16 MG/DL (ref 8–23)
CALCIUM SERPL-MCNC: 8.4 MG/DL (ref 8.7–10.5)
CHLORIDE SERPL-SCNC: 104 MMOL/L (ref 95–110)
CO2 SERPL-SCNC: 20 MMOL/L (ref 23–29)
CREAT SERPL-MCNC: 2.1 MG/DL (ref 0.5–1.4)
DIFFERENTIAL METHOD: ABNORMAL
EOSINOPHIL # BLD AUTO: 0.3 K/UL (ref 0–0.5)
EOSINOPHIL NFR BLD: 4.3 % (ref 0–8)
ERYTHROCYTE [DISTWIDTH] IN BLOOD BY AUTOMATED COUNT: 16.8 % (ref 11.5–14.5)
EST. GFR  (NO RACE VARIABLE): 24.7 ML/MIN/1.73 M^2
GLUCOSE SERPL-MCNC: 159 MG/DL (ref 70–110)
HCT VFR BLD AUTO: 27.2 % (ref 37–48.5)
HGB BLD-MCNC: 8.9 G/DL (ref 12–16)
IMM GRANULOCYTES # BLD AUTO: 0.06 K/UL (ref 0–0.04)
IMM GRANULOCYTES NFR BLD AUTO: 1 % (ref 0–0.5)
LYMPHOCYTES # BLD AUTO: 1.6 K/UL (ref 1–4.8)
LYMPHOCYTES NFR BLD: 25 % (ref 18–48)
MCH RBC QN AUTO: 29.7 PG (ref 27–31)
MCHC RBC AUTO-ENTMCNC: 32.7 G/DL (ref 32–36)
MCV RBC AUTO: 91 FL (ref 82–98)
MONOCYTES # BLD AUTO: 0.6 K/UL (ref 0.3–1)
MONOCYTES NFR BLD: 9 % (ref 4–15)
NEUTROPHILS # BLD AUTO: 3.8 K/UL (ref 1.8–7.7)
NEUTROPHILS NFR BLD: 60.4 % (ref 38–73)
NRBC BLD-RTO: 0 /100 WBC
PLATELET # BLD AUTO: 191 K/UL (ref 150–450)
PMV BLD AUTO: 9.5 FL (ref 9.2–12.9)
POTASSIUM SERPL-SCNC: 3.8 MMOL/L (ref 3.5–5.1)
PROT SERPL-MCNC: 5.6 G/DL (ref 6–8.4)
RBC # BLD AUTO: 3 M/UL (ref 4–5.4)
SODIUM SERPL-SCNC: 136 MMOL/L (ref 136–145)
WBC # BLD AUTO: 6.31 K/UL (ref 3.9–12.7)

## 2023-09-12 PROCEDURE — 85025 COMPLETE CBC W/AUTO DIFF WBC: CPT | Mod: HCNC | Performed by: INTERNAL MEDICINE

## 2023-09-12 PROCEDURE — 80053 COMPREHEN METABOLIC PANEL: CPT | Mod: HCNC | Performed by: INTERNAL MEDICINE

## 2023-09-13 ENCOUNTER — EXTERNAL HOME HEALTH (OUTPATIENT)
Dept: HOME HEALTH SERVICES | Facility: HOSPITAL | Age: 73
End: 2023-09-13
Payer: MEDICARE

## 2023-09-14 ENCOUNTER — TELEPHONE (OUTPATIENT)
Dept: PHYSICAL MEDICINE AND REHAB | Facility: CLINIC | Age: 73
End: 2023-09-14
Payer: MEDICARE

## 2023-09-14 ENCOUNTER — PATIENT MESSAGE (OUTPATIENT)
Dept: PHYSICAL MEDICINE AND REHAB | Facility: CLINIC | Age: 73
End: 2023-09-14
Payer: MEDICARE

## 2023-09-14 RX ORDER — HYDROCODONE BITARTRATE AND ACETAMINOPHEN 10; 325 MG/1; MG/1
1 TABLET ORAL EVERY 6 HOURS PRN
Qty: 120 TABLET | Refills: 0 | Status: SHIPPED | OUTPATIENT
Start: 2023-09-20 | End: 2023-10-27 | Stop reason: SDUPTHER

## 2023-09-14 NOTE — TELEPHONE ENCOUNTER
I called the patient.    Her last prescription for hydrocodone/APAP was inadvertently sent for 1 week.  It was picked up yesterday.  I told her I will send a prescription for 1 month for picked up in 6 days.  She voiced understanding.

## 2023-09-14 NOTE — TELEPHONE ENCOUNTER
----- Message from Nemo Schwartz sent at 9/14/2023  9:10 AM CDT -----  Regarding: Pt needs call back to f/u about , 7 day medication issue, used to having 1 month.  Contact: @[phone]  Regarding: Medication in chart says 28 tablets, but  Selected for 7 days or less- please advise pt, used to having enough for one month.    Contact: Cecile    Type: Call back to explain, medication adjusted for the full month.    Who Called: Cecile    Would the patient rather a call back or a response via [facility]?     Best Call Back Number: @[phone]    Additional Information:   HYDROcodone-acetaminophen (NORCO)  mg per tablet    Sig - Route: Take 1 tablet by mouth every 6 (six) hours as needed for Pain. - Oral  Sent to pharmacy as: HYDROcodone-acetaminophen (NORCO)  mg per tablet  Class: Normal  Earliest Fill Date: 9/11/2023  Notes to Pharmacy: Quantity prescribed more than 7 day supply? No

## 2023-09-17 ENCOUNTER — TELEPHONE (OUTPATIENT)
Dept: HOME HEALTH SERVICES | Facility: CLINIC | Age: 73
End: 2023-09-17
Payer: MEDICARE

## 2023-09-17 NOTE — TELEPHONE ENCOUNTER
9/6/2023 @6:13pm    Patient on Palliative Care NP's schedule tomorrow. Called patient ([phone]) to setup time for appointment; however, there was no answer so left message for return call. Called [phone] and left another message>>did not received return call.      NP has scheduled pt multiple times with no answer or return call           Rylee Cheng DNP, FNP-C  [facility]  [phone]

## 2023-09-26 ENCOUNTER — LAB VISIT (OUTPATIENT)
Dept: LAB | Facility: HOSPITAL | Age: 73
End: 2023-09-26
Attending: NURSE PRACTITIONER
Payer: MEDICARE

## 2023-09-26 DIAGNOSIS — N31.9 HIGH COMPLIANCE BLADDER: Primary | ICD-10-CM

## 2023-09-26 LAB
ALBUMIN SERPL BCP-MCNC: 2.9 G/DL (ref 3.5–5.2)
ALP SERPL-CCNC: 269 U/L (ref 55–135)
ALT SERPL W/O P-5'-P-CCNC: 44 U/L (ref 10–44)
ANION GAP SERPL CALC-SCNC: 11 MMOL/L (ref 8–16)
AST SERPL-CCNC: 26 U/L (ref 10–40)
BASOPHILS # BLD AUTO: 0.04 K/UL (ref 0–0.2)
BASOPHILS NFR BLD: 0.6 % (ref 0–1.9)
BILIRUB SERPL-MCNC: 0.3 MG/DL (ref 0.1–1)
BUN SERPL-MCNC: 32 MG/DL (ref 8–23)
CALCIUM SERPL-MCNC: 8.8 MG/DL (ref 8.7–10.5)
CHLORIDE SERPL-SCNC: 101 MMOL/L (ref 95–110)
CO2 SERPL-SCNC: 23 MMOL/L (ref 23–29)
CREAT SERPL-MCNC: 2.8 MG/DL (ref 0.5–1.4)
DIFFERENTIAL METHOD: ABNORMAL
EOSINOPHIL # BLD AUTO: 0.3 K/UL (ref 0–0.5)
EOSINOPHIL NFR BLD: 4.7 % (ref 0–8)
ERYTHROCYTE [DISTWIDTH] IN BLOOD BY AUTOMATED COUNT: 15.5 % (ref 11.5–14.5)
EST. GFR  (NO RACE VARIABLE): 17.5 ML/MIN/1.73 M^2
GLUCOSE SERPL-MCNC: 117 MG/DL (ref 70–110)
HCT VFR BLD AUTO: 32.3 % (ref 37–48.5)
HGB BLD-MCNC: 10.5 G/DL (ref 12–16)
IMM GRANULOCYTES # BLD AUTO: 0.03 K/UL (ref 0–0.04)
IMM GRANULOCYTES NFR BLD AUTO: 0.5 % (ref 0–0.5)
LYMPHOCYTES # BLD AUTO: 1.5 K/UL (ref 1–4.8)
LYMPHOCYTES NFR BLD: 22.7 % (ref 18–48)
MCH RBC QN AUTO: 29.8 PG (ref 27–31)
MCHC RBC AUTO-ENTMCNC: 32.5 G/DL (ref 32–36)
MCV RBC AUTO: 92 FL (ref 82–98)
MONOCYTES # BLD AUTO: 0.5 K/UL (ref 0.3–1)
MONOCYTES NFR BLD: 7.6 % (ref 4–15)
NEUTROPHILS # BLD AUTO: 4.2 K/UL (ref 1.8–7.7)
NEUTROPHILS NFR BLD: 63.9 % (ref 38–73)
NRBC BLD-RTO: 0 /100 WBC
PLATELET # BLD AUTO: 251 K/UL (ref 150–450)
PMV BLD AUTO: 9.4 FL (ref 9.2–12.9)
POTASSIUM SERPL-SCNC: 4 MMOL/L (ref 3.5–5.1)
PROT SERPL-MCNC: 6.2 G/DL (ref 6–8.4)
RBC # BLD AUTO: 3.52 M/UL (ref 4–5.4)
SODIUM SERPL-SCNC: 135 MMOL/L (ref 136–145)
WBC # BLD AUTO: 6.62 K/UL (ref 3.9–12.7)

## 2023-09-26 PROCEDURE — 85025 COMPLETE CBC W/AUTO DIFF WBC: CPT | Mod: HCNC | Performed by: NURSE PRACTITIONER

## 2023-09-26 PROCEDURE — 80053 COMPREHEN METABOLIC PANEL: CPT | Mod: HCNC | Performed by: NURSE PRACTITIONER

## 2023-10-10 ENCOUNTER — LAB VISIT (OUTPATIENT)
Dept: LAB | Facility: HOSPITAL | Age: 73
End: 2023-10-10
Attending: INTERNAL MEDICINE
Payer: MEDICARE

## 2023-10-10 DIAGNOSIS — N31.9 HIGH COMPLIANCE BLADDER: Primary | ICD-10-CM

## 2023-10-10 LAB
ALBUMIN SERPL BCP-MCNC: 2.6 G/DL (ref 3.5–5.2)
ALP SERPL-CCNC: 282 U/L (ref 55–135)
ALT SERPL W/O P-5'-P-CCNC: 144 U/L (ref 10–44)
ANION GAP SERPL CALC-SCNC: 9 MMOL/L (ref 8–16)
AST SERPL-CCNC: 242 U/L (ref 10–40)
BASOPHILS # BLD AUTO: 0.03 K/UL (ref 0–0.2)
BASOPHILS NFR BLD: 0.6 % (ref 0–1.9)
BILIRUB SERPL-MCNC: 0.2 MG/DL (ref 0.1–1)
BUN SERPL-MCNC: 21 MG/DL (ref 8–23)
CALCIUM SERPL-MCNC: 8.4 MG/DL (ref 8.7–10.5)
CHLORIDE SERPL-SCNC: 103 MMOL/L (ref 95–110)
CO2 SERPL-SCNC: 21 MMOL/L (ref 23–29)
CREAT SERPL-MCNC: 2.1 MG/DL (ref 0.5–1.4)
DIFFERENTIAL METHOD: ABNORMAL
EOSINOPHIL # BLD AUTO: 0.3 K/UL (ref 0–0.5)
EOSINOPHIL NFR BLD: 6.1 % (ref 0–8)
ERYTHROCYTE [DISTWIDTH] IN BLOOD BY AUTOMATED COUNT: 15.1 % (ref 11.5–14.5)
EST. GFR  (NO RACE VARIABLE): 24.7 ML/MIN/1.73 M^2
GLUCOSE SERPL-MCNC: 112 MG/DL (ref 70–110)
HCT VFR BLD AUTO: 34.7 % (ref 37–48.5)
HGB BLD-MCNC: 11.6 G/DL (ref 12–16)
IMM GRANULOCYTES # BLD AUTO: 0.03 K/UL (ref 0–0.04)
IMM GRANULOCYTES NFR BLD AUTO: 0.6 % (ref 0–0.5)
LYMPHOCYTES # BLD AUTO: 1.4 K/UL (ref 1–4.8)
LYMPHOCYTES NFR BLD: 26.4 % (ref 18–48)
MCH RBC QN AUTO: 30.4 PG (ref 27–31)
MCHC RBC AUTO-ENTMCNC: 33.4 G/DL (ref 32–36)
MCV RBC AUTO: 91 FL (ref 82–98)
MONOCYTES # BLD AUTO: 0.5 K/UL (ref 0.3–1)
MONOCYTES NFR BLD: 9 % (ref 4–15)
NEUTROPHILS # BLD AUTO: 3 K/UL (ref 1.8–7.7)
NEUTROPHILS NFR BLD: 57.3 % (ref 38–73)
NRBC BLD-RTO: 0 /100 WBC
PLATELET # BLD AUTO: 221 K/UL (ref 150–450)
PMV BLD AUTO: 9.4 FL (ref 9.2–12.9)
POTASSIUM SERPL-SCNC: 3.4 MMOL/L (ref 3.5–5.1)
PROT SERPL-MCNC: 5.6 G/DL (ref 6–8.4)
RBC # BLD AUTO: 3.82 M/UL (ref 4–5.4)
SODIUM SERPL-SCNC: 133 MMOL/L (ref 136–145)
WBC # BLD AUTO: 5.23 K/UL (ref 3.9–12.7)

## 2023-10-10 PROCEDURE — 80053 COMPREHEN METABOLIC PANEL: CPT | Mod: HCNC | Performed by: INTERNAL MEDICINE

## 2023-10-10 PROCEDURE — 85025 COMPLETE CBC W/AUTO DIFF WBC: CPT | Mod: HCNC | Performed by: INTERNAL MEDICINE

## 2023-10-17 ENCOUNTER — TELEPHONE (OUTPATIENT)
Dept: INTERNAL MEDICINE | Facility: CLINIC | Age: 73
End: 2023-10-17
Payer: MEDICARE

## 2023-10-17 NOTE — TELEPHONE ENCOUNTER
Spoke to pt. Pt advised that visit for today is not needed.   Pt ok with this.appointment cancelled.    Pt also advised that lab from dialysis also does not need to be sent to Dr. Cancino any more.   Pt stated that she will relay the message.

## 2023-10-17 NOTE — TELEPHONE ENCOUNTER
----- Message from Lurdes Navarro MD sent at 10/12/2023  3:42 PM CDT -----  Appears patient has a hospital discharge appointment next week for a discharge that occurred in   August.      Patient has dialysis several days a week and is closely monitored and it is unlikely this appointment is needed especially with her transportation issues and we should be able to cancel.    In addition patient has lab drawn several times weekly at her dialysis sessions.    She is having other lab drawn under my name that is not necessary,  please see if we can clarify where that order came from and discontinue it.

## 2023-10-18 ENCOUNTER — TELEPHONE (OUTPATIENT)
Dept: ENDOSCOPY | Facility: HOSPITAL | Age: 73
End: 2023-10-18
Payer: MEDICARE

## 2023-10-18 DIAGNOSIS — G89.29 CHRONIC MIDLINE LOW BACK PAIN WITHOUT SCIATICA: ICD-10-CM

## 2023-10-18 DIAGNOSIS — M54.2 CHRONIC NECK PAIN: ICD-10-CM

## 2023-10-18 DIAGNOSIS — M54.50 CHRONIC MIDLINE LOW BACK PAIN WITHOUT SCIATICA: ICD-10-CM

## 2023-10-18 DIAGNOSIS — G89.29 CHRONIC NECK PAIN: ICD-10-CM

## 2023-10-18 DIAGNOSIS — M79.7 FIBROMYALGIA: ICD-10-CM

## 2023-10-18 NOTE — TELEPHONE ENCOUNTER
Dear Dr Osuna,    Patient has a scheduled procedure Colonoscopy on 12/7/23 and is currently taking a blood thinner prescribed by your office. In order to ensure patient safety, we would like to confirm that the patient can place their blood thinner medication on hold for the procedure. Can he/she discontinue Eliquis (apixaban) for a minimum of 2 days prior to the procedure?     Thank you for your prompt reply.    [facility] Endoscopy Scheduling

## 2023-10-18 NOTE — TELEPHONE ENCOUNTER
----- Message from Thalia Santos RN sent at 2023  2:41 PM CDT -----  Regardin/7 BT/CL  The patient is currently under an internal PCP Lurdes moore and requires a blood thinner Eliquis (apixaban) for their upcoming scheduled Colonoscopy on 23.     Additional clearances required:  internal   PCP   clearance   Other        Notes: patient recently discharged from hospital with volume overload

## 2023-10-19 ENCOUNTER — TELEPHONE (OUTPATIENT)
Dept: INTERNAL MEDICINE | Facility: CLINIC | Age: 73
End: 2023-10-19
Payer: MEDICARE

## 2023-10-19 DIAGNOSIS — R79.89 ELEVATED LFTS: Primary | ICD-10-CM

## 2023-10-19 NOTE — TELEPHONE ENCOUNTER
Reviewed lab results wit pt and informed her that a referral to a liver specialist will be placed.     Pt state her nephrologist is Dr. López, office in [place] but she prefers someone closer to her location.

## 2023-10-19 NOTE — TELEPHONE ENCOUNTER
[facility] called at [phone].  Stella not available. Left message with  to have Stella call back, since callback number left is disconnected.

## 2023-10-19 NOTE — TELEPHONE ENCOUNTER
----- Message from Tasha Sena sent at 10/19/2023 12:06 PM CDT -----  Contact: SHAKA/Stella [phone]  Would like to decrease HH frequency to change murillo to once a month and monitor wound once a month due to insurance payment issues? Would this be okay? Please fax to [phone]. Thanks

## 2023-10-19 NOTE — TELEPHONE ENCOUNTER
Spoke to Stella with [facility]. She stated that [facility] will no longer pay for the weekly SN and wound care visits. She stated that they need to cut back to wound care visits and suprapubic catheter changes to monthly. She stated that the family has been educated on how to provide the pt's wound care.   I gave the verbal ok on this.    Ilai.

## 2023-10-19 NOTE — TELEPHONE ENCOUNTER
----- Message from Lurdes Navarro MD sent at 10/18/2023 10:17 AM CDT -----  Your blood work revealed an increase in your liver tests.   A referral to the liver specialist will be placed for further evaluation.  The referral  will call you to set up the appointment.  Dylan Alfaro , please call pt and advise and she needs to discuss w/ her Nephrologist, please ask her for his/her name

## 2023-10-20 RX ORDER — METHOCARBAMOL 750 MG/1
TABLET, FILM COATED ORAL
Qty: 180 TABLET | Refills: 1 | Status: SHIPPED | OUTPATIENT
Start: 2023-10-20 | End: 2023-12-01

## 2023-10-20 NOTE — TELEPHONE ENCOUNTER
Spoke to pt. Pt stated that she was already aware of recommendations.  Pt has already been contacted by hepatology to schedule. She stated that she did not have her calendar with her when she was called. They advised that they will call her back on Tuesday, 10/24/23 to schedule.

## 2023-10-20 NOTE — TELEPHONE ENCOUNTER
Consult order placed -Hepatology- elevated LFT's    Her Nephrologist is based local and writing her dialysis orders weekly  MR reported Dr. Gavin Zuluaga

## 2023-10-23 ENCOUNTER — TELEPHONE (OUTPATIENT)
Dept: INTERNAL MEDICINE | Facility: CLINIC | Age: 73
End: 2023-10-23
Payer: MEDICARE

## 2023-10-24 DIAGNOSIS — M79.89 PAIN AND SWELLING OF LOWER EXTREMITY, UNSPECIFIED LATERALITY: Primary | ICD-10-CM

## 2023-10-24 DIAGNOSIS — M79.606 PAIN AND SWELLING OF LOWER EXTREMITY, UNSPECIFIED LATERALITY: Primary | ICD-10-CM

## 2023-10-24 DIAGNOSIS — I80.03 PHLEBITIS AND THROMBOPHLEBITIS OF SUPERFICIAL VESSELS OF LOWER EXTREMITIES, BILATERAL: ICD-10-CM

## 2023-10-24 NOTE — TELEPHONE ENCOUNTER
Noted.  Pt is waiting to be called back on Tuesday to be scheduled with hepatology.    Will call pt to inquire on nephrologist.

## 2023-10-26 ENCOUNTER — OFFICE VISIT (OUTPATIENT)
Dept: PODIATRY | Facility: CLINIC | Age: 73
End: 2023-10-26
Payer: MEDICARE

## 2023-10-26 ENCOUNTER — TELEPHONE (OUTPATIENT)
Dept: HOME HEALTH SERVICES | Facility: CLINIC | Age: 73
End: 2023-10-26
Payer: MEDICARE

## 2023-10-26 ENCOUNTER — OFFICE VISIT (OUTPATIENT)
Dept: UROLOGY | Facility: CLINIC | Age: 73
End: 2023-10-26
Payer: MEDICARE

## 2023-10-26 VITALS
WEIGHT: 229.25 LBS | DIASTOLIC BLOOD PRESSURE: 79 MMHG | SYSTOLIC BLOOD PRESSURE: 167 MMHG | HEIGHT: 68 IN | HEART RATE: 85 BPM | BODY MASS INDEX: 34.75 KG/M2

## 2023-10-26 VITALS
HEIGHT: 68 IN | HEART RATE: 74 BPM | DIASTOLIC BLOOD PRESSURE: 77 MMHG | WEIGHT: 250 LBS | SYSTOLIC BLOOD PRESSURE: 139 MMHG | BODY MASS INDEX: 37.89 KG/M2

## 2023-10-26 DIAGNOSIS — E11.49 TYPE II DIABETES MELLITUS WITH NEUROLOGICAL MANIFESTATIONS: Primary | ICD-10-CM

## 2023-10-26 DIAGNOSIS — B35.1 ONYCHOMYCOSIS DUE TO DERMATOPHYTE: ICD-10-CM

## 2023-10-26 DIAGNOSIS — Z93.59 SUPRAPUBIC CATHETER: ICD-10-CM

## 2023-10-26 DIAGNOSIS — R60.0 BILATERAL LOWER EXTREMITY EDEMA: ICD-10-CM

## 2023-10-26 DIAGNOSIS — Z43.5 ENCOUNTER FOR CARE OR REPLACEMENT OF SUPRAPUBIC TUBE: Primary | ICD-10-CM

## 2023-10-26 DIAGNOSIS — I73.9 PVD (PERIPHERAL VASCULAR DISEASE): ICD-10-CM

## 2023-10-26 DIAGNOSIS — A49.9 BACTERIAL UTI: ICD-10-CM

## 2023-10-26 DIAGNOSIS — M47.816 LUMBAR SPONDYLOSIS: Primary | ICD-10-CM

## 2023-10-26 DIAGNOSIS — R33.9 URINARY RETENTION: ICD-10-CM

## 2023-10-26 DIAGNOSIS — N39.0 BACTERIAL UTI: ICD-10-CM

## 2023-10-26 PROCEDURE — 3044F HG A1C LEVEL LT 7.0%: CPT | Mod: HCNC,CPTII,S$GLB,

## 2023-10-26 PROCEDURE — 3044F PR MOST RECENT HEMOGLOBIN A1C LEVEL <7.0%: ICD-10-PCS | Mod: HCNC,CPTII,S$GLB,

## 2023-10-26 PROCEDURE — 99999 PR PBB SHADOW E&M-EST. PATIENT-LVL IV: CPT | Mod: PBBFAC,HCNC,, | Performed by: PODIATRIST

## 2023-10-26 PROCEDURE — 1160F PR REVIEW ALL MEDS BY PRESCRIBER/CLIN PHARMACIST DOCUMENTED: ICD-10-PCS | Mod: HCNC,CPTII,S$GLB,

## 2023-10-26 PROCEDURE — 87077 CULTURE AEROBIC IDENTIFY: CPT | Mod: HCNC

## 2023-10-26 PROCEDURE — 99999 PR PBB SHADOW E&M-EST. PATIENT-LVL IV: ICD-10-PCS | Mod: PBBFAC,HCNC,, | Performed by: PODIATRIST

## 2023-10-26 PROCEDURE — 3078F PR MOST RECENT DIASTOLIC BLOOD PRESSURE < 80 MM HG: ICD-10-PCS | Mod: HCNC,CPTII,S$GLB,

## 2023-10-26 PROCEDURE — 3072F LOW RISK FOR RETINOPATHY: CPT | Mod: HCNC,CPTII,S$GLB,

## 2023-10-26 PROCEDURE — 3077F PR MOST RECENT SYSTOLIC BLOOD PRESSURE >= 140 MM HG: ICD-10-PCS | Mod: HCNC,CPTII,S$GLB,

## 2023-10-26 PROCEDURE — 99499 NO LOS: ICD-10-PCS | Mod: HCNC,S$GLB,, | Performed by: PODIATRIST

## 2023-10-26 PROCEDURE — 99214 OFFICE O/P EST MOD 30 MIN: CPT | Mod: HCNC,25,S$GLB,

## 2023-10-26 PROCEDURE — 87086 URINE CULTURE/COLONY COUNT: CPT | Mod: HCNC

## 2023-10-26 PROCEDURE — 3288F PR FALLS RISK ASSESSMENT DOCUMENTED: ICD-10-PCS | Mod: HCNC,CPTII,S$GLB,

## 2023-10-26 PROCEDURE — 3078F DIAST BP <80 MM HG: CPT | Mod: HCNC,CPTII,S$GLB,

## 2023-10-26 PROCEDURE — 3077F SYST BP >= 140 MM HG: CPT | Mod: HCNC,CPTII,S$GLB,

## 2023-10-26 PROCEDURE — 3288F FALL RISK ASSESSMENT DOCD: CPT | Mod: HCNC,CPTII,S$GLB,

## 2023-10-26 PROCEDURE — 87186 SC STD MICRODIL/AGAR DIL: CPT | Mod: HCNC

## 2023-10-26 PROCEDURE — 87088 URINE BACTERIA CULTURE: CPT | Mod: HCNC

## 2023-10-26 PROCEDURE — 11721 DEBRIDE NAIL 6 OR MORE: CPT | Mod: Q8,HCNC,S$GLB, | Performed by: PODIATRIST

## 2023-10-26 PROCEDURE — 1159F MED LIST DOCD IN RCRD: CPT | Mod: HCNC,CPTII,S$GLB,

## 2023-10-26 PROCEDURE — 1160F RVW MEDS BY RX/DR IN RCRD: CPT | Mod: HCNC,CPTII,S$GLB,

## 2023-10-26 PROCEDURE — 51705 PR CHANGE OF BLADDER TUBE,SIMPLE: ICD-10-PCS | Mod: HCNC,S$GLB,,

## 2023-10-26 PROCEDURE — 3066F PR DOCUMENTATION OF TREATMENT FOR NEPHROPATHY: ICD-10-PCS | Mod: HCNC,CPTII,S$GLB,

## 2023-10-26 PROCEDURE — 99499 UNLISTED E&M SERVICE: CPT | Mod: HCNC,S$GLB,, | Performed by: PODIATRIST

## 2023-10-26 PROCEDURE — 3066F NEPHROPATHY DOC TX: CPT | Mod: HCNC,CPTII,S$GLB,

## 2023-10-26 PROCEDURE — 51705 CHANGE OF BLADDER TUBE: CPT | Mod: HCNC,S$GLB,,

## 2023-10-26 PROCEDURE — 3008F PR BODY MASS INDEX (BMI) DOCUMENTED: ICD-10-PCS | Mod: HCNC,CPTII,S$GLB,

## 2023-10-26 PROCEDURE — 3008F BODY MASS INDEX DOCD: CPT | Mod: HCNC,CPTII,S$GLB,

## 2023-10-26 PROCEDURE — 1101F PT FALLS ASSESS-DOCD LE1/YR: CPT | Mod: HCNC,CPTII,S$GLB,

## 2023-10-26 PROCEDURE — 99214 PR OFFICE/OUTPT VISIT, EST, LEVL IV, 30-39 MIN: ICD-10-PCS | Mod: HCNC,25,S$GLB,

## 2023-10-26 PROCEDURE — 1125F AMNT PAIN NOTED PAIN PRSNT: CPT | Mod: HCNC,CPTII,S$GLB,

## 2023-10-26 PROCEDURE — 99999 PR PBB SHADOW E&M-EST. PATIENT-LVL V: CPT | Mod: PBBFAC,HCNC,,

## 2023-10-26 PROCEDURE — 1159F PR MEDICATION LIST DOCUMENTED IN MEDICAL RECORD: ICD-10-PCS | Mod: HCNC,CPTII,S$GLB,

## 2023-10-26 PROCEDURE — 3072F PR LOW RISK FOR RETINOPATHY: ICD-10-PCS | Mod: HCNC,CPTII,S$GLB,

## 2023-10-26 PROCEDURE — 1125F PR PAIN SEVERITY QUANTIFIED, PAIN PRESENT: ICD-10-PCS | Mod: HCNC,CPTII,S$GLB,

## 2023-10-26 PROCEDURE — 99999 PR PBB SHADOW E&M-EST. PATIENT-LVL V: ICD-10-PCS | Mod: PBBFAC,HCNC,,

## 2023-10-26 PROCEDURE — 1101F PR PT FALLS ASSESS DOC 0-1 FALLS W/OUT INJ PAST YR: ICD-10-PCS | Mod: HCNC,CPTII,S$GLB,

## 2023-10-26 PROCEDURE — 11721 PR DEBRIDEMENT OF NAILS, 6 OR MORE: ICD-10-PCS | Mod: Q8,HCNC,S$GLB, | Performed by: PODIATRIST

## 2023-10-26 RX ORDER — FUROSEMIDE 40 MG/1
1 TABLET ORAL DAILY
Status: ON HOLD | COMMUNITY
Start: 2023-08-20 | End: 2024-02-14 | Stop reason: HOSPADM

## 2023-10-26 RX ORDER — CALCIUM ACETATE 667 MG/1
CAPSULE ORAL 3 TIMES DAILY
COMMUNITY
Start: 2023-08-09

## 2023-10-26 RX ORDER — LANOLIN ALCOHOL/MO/W.PET/CERES
1 CREAM (GRAM) TOPICAL DAILY
Status: ON HOLD | COMMUNITY
Start: 2023-08-20 | End: 2024-02-14 | Stop reason: HOSPADM

## 2023-10-26 RX ORDER — NIFEDIPINE 90 MG/1
1 TABLET, FILM COATED, EXTENDED RELEASE ORAL DAILY
Status: ON HOLD | COMMUNITY
Start: 2023-08-20 | End: 2024-02-14 | Stop reason: HOSPADM

## 2023-10-26 RX ORDER — ERENUMAB-AOOE 140 MG/ML
INJECTION, SOLUTION SUBCUTANEOUS
COMMUNITY
Start: 2023-08-20 | End: 2024-02-15 | Stop reason: SDUPTHER

## 2023-10-26 RX ORDER — ISOSORBIDE MONONITRATE 30 MG/1
1 TABLET, EXTENDED RELEASE ORAL DAILY
Status: ON HOLD | COMMUNITY
Start: 2023-08-20 | End: 2024-02-14 | Stop reason: HOSPADM

## 2023-10-26 RX ORDER — CALCIUM ACETATE 667 MG
TABLET ORAL
Status: ON HOLD | COMMUNITY
Start: 2023-08-20 | End: 2024-02-14 | Stop reason: HOSPADM

## 2023-10-26 RX ORDER — POLYETHYLENE GLYCOL-3350 AND ELECTROLYTES WITH FLAVOR PACK 240; 5.84; 2.98; 6.72; 22.72 G/278.26G; G/278.26G; G/278.26G; G/278.26G; G/278.26G
4000 POWDER, FOR SOLUTION ORAL ONCE
COMMUNITY
Start: 2023-09-07 | End: 2024-02-21 | Stop reason: ALTCHOICE

## 2023-10-26 NOTE — PROGRESS NOTES
Subjective:      Patient ID: Cecile Bowen is a 73 y.o. female.    Chief Complaint: Diabetes Mellitus (PCP- 08/04/2023/Lurdes Navarro MD) and Nail Care    Cecile is a 73 y.o. female who presents to the clinic for evaluation and treatment of high risk feet. Cecile has a past medical history of Cervicogenic migraine, Chronic pain, CKD (chronic kidney disease) stage 4, GFR 15-29 ml/min, CKD (chronic kidney disease) stage 4, GFR 15-29 ml/min, Diabetes mellitus, Dialysis patient (1/21/2022), Fibromyalgia, Hydronephrosis, Hyperlipidemia, Hypertension (12/12/2012), Hypothyroidism (12/12/2012), ARON (iron deficiency anemia), Insomnia, Levoscoliosis, Malignant neoplasm of upper-outer quadrant of left breast in female, estrogen receptor positive, Metabolic acidosis, Mobility impaired, Nuclear sclerosis - Both Eyes (3/24/2014), VIJAYA (obstructive sleep apnea), Osteopenia, Pulmonary nodule, Recurrent UTI, Renal manifestation of secondary diabetes mellitus, Secondary hyperparathyroidism, renal, and Urinary retention. The patient's chief complaint is bilateral LE edema,  she also complains of long, thick toenails. Routine trimming helps.  This patient has documented high risk feet requiring routine maintenance secondary to diabetes mellitis and those secondary complications of diabetes, as mentioned.    Current shoe gear socks only    Wheelchair bound    PCP: Lurdes Navarro MD    Date Last Seen by PCP:   Chief Complaint   Patient presents with    Diabetes Mellitus     PCP- 08/04/2023  Lurdes Navarro MD    Nail Care     Hemoglobin A1C   Date Value Ref Range Status   10/06/2023 5.0 4.8 - 5.9 % Final   08/21/2023 6.3 (H) 4.8 - 5.9 % Final   08/04/2023 6.1 (H) 4.0 - 5.6 % Final     Comment:     ADA Screening Guidelines:  5.7-6.4%  Consistent with prediabetes  >or=6.5%  Consistent with diabetes    High levels of fetal hemoglobin interfere with the HbA1C  assay. Heterozygous hemoglobin variants  (HbS, HgC, etc)do  not significantly interfere with this assay.   However, presence of multiple variants may affect accuracy.             Patient Active Problem List   Diagnosis    Hypothyroidism    Fibromyalgia    Intractable chronic migraine without aura    Vitamin D deficiency disease    Hyperlipidemia associated with type 2 diabetes mellitus    VIJAYA (obstructive sleep apnea)    Hyponatremia    Abnormality of gait and mobility    Osteoarthritis of lumbar spine    Recurrent urinary tract infection    Sideroblastic anemia    Iron deficiency anemia    Primary osteoarthritis involving multiple joints    Urinary retention    Normal anion gap metabolic acidosis    Neurogenic bladder    Major depressive disorder, recurrent episode, moderate    Insomnia    Mixed migraine and muscle contraction headache    Secondary hyperparathyroidism, renal    Suprapubic catheter    Pulmonary nodule    Osteopenia    Morbid obesity due to excess calories    Chronic daily headache    Long term prescription opiate use    Lumbar spondylosis    Chronic pain    Cervicogenic migraine    Malignant neoplasm of left breast, estrogen receptor positive    Risk for falls    Facet arthritis of lumbar region    Obesity, diabetes, and hypertension syndrome    Requires daily assistance for activities of daily living (ADL) and comfort needs    S/P left mastectomy    Prophylactic use of anastrozole (Arimidex)    Type 2 diabetes mellitus with stage 4 chronic kidney disease, with long-term current use of insulin    High priority for 2019 novel coronavirus vaccination    CKD (chronic kidney disease) stage 4, GFR 15-29 ml/min    Nephrotic range proteinuria    Left ureteral stone    Nephrolithiasis    Transaminitis    Chronic obstructive pyelonephritis    UTI (urinary tract infection)    Normocytic anemia    Acute kidney injury superimposed on CKD    A-fib    Debility    Encounter for palliative care    Constipation    Hypertension associated with diabetes     "Chronic kidney disease-mineral and bone disorder    Anemia of chronic disease    Urinary tract infection due to ESBL Klebsiella    Pressure injury of right buttock, stage 3    CKD (chronic kidney disease) stage V requiring chronic dialysis    Acute cystitis with hematuria    Uncomplicated opioid dependence    History of falling    Long term current use of insulin    Personal history of malignant neoplasm of breast    Aortic atherosclerosis    Diabetic polyneuropathy associated with type 2 diabetes mellitus    Bacteria in urine    Hyperphosphatemia    Irritant contact dermatitis due to fecal, urinary or dual incontinence    Volume overload       Current Outpatient Medications on File Prior to Visit   Medication Sig Dispense Refill    amLODIPine (NORVASC) 5 MG tablet Take 5 mg by mouth once daily.      anastrozole (ARIMIDEX) 1 mg Tab Take 1 tablet (1 mg total) by mouth once daily. 90 tablet 2    apixaban (ELIQUIS) 5 mg Tab Take 1 tablet (5 mg total) by mouth 2 (two) times daily. 180 tablet 3    apixaban (ELIQUIS) 5 mg Tab Take 1 tablet by mouth 2 (two) times daily.      atorvastatin (LIPITOR) 80 MG tablet Take 1 tablet (80 mg total) by mouth once daily. 90 tablet 3    BD INSULIN SYRINGE ULTRA-FINE 0.5 mL 31 gauge x 5/16" Syrg USE WITH INSULIN 4 TIMES A  each 12    calcium acetate 667 mg Tab 1 capsule 3 TIMES DAILY (route: oral)      calcium acetate,phosphat bind, (PHOSLO) 667 mg capsule Take by mouth 3 (three) times daily.      calcium acetate,phosphat bind, (PHOSLO) 667 mg tablet Take 1 tablet (667 mg total) by mouth 3 (three) times daily with meals. 90 tablet 11    carvediloL (COREG) 6.25 MG tablet Take 1 tablet (6.25 mg total) by mouth 2 (two) times daily. 60 tablet 11    DULoxetine (CYMBALTA) 20 MG capsule Take 20 mg by mouth 2 (two) times daily.      erenumab-aooe (AIMOVIG AUTOINJECTOR) 140 mg/mL autoinjector Inject 1 mL (140 mg total) into the skin every 30 days. No further refills without appointment 1 " "mL 2    erenumab-aooe (AIMOVIG AUTOINJECTOR) 140 mg/mL autoinjector 140 mg MONTHLY (route: subcutaneous)      ergocalciferol (ERGOCALCIFEROL) 50,000 unit Cap TAKE ONE CAPSULE BY MOUTH ONE TIME PER WEEK, TAKES ON TUESDAYS 12 capsule 3    furosemide (LASIX) 40 MG tablet Take 1 tablet (40 mg total) by mouth once daily. 30 tablet 11    furosemide (LASIX) 40 MG tablet Take 1 tablet by mouth once daily.      GAVILYTE-C 240-22.72-6.72 -5.84 gram SolR Take 4,000 mLs by mouth once.      insulin (LANTUS SOLOSTAR U-100 INSULIN) glargine 100 units/mL SubQ pen INJECT 14-15 UNITS UNDER THE SKIN ONCE DAILY (Patient taking differently: INJECT 14-16 UNITS UNDER THE SKIN ONCE DAILY) 15 each 3    isosorbide mononitrate (IMDUR) 30 MG 24 hr tablet Take 1 tablet (30 mg total) by mouth once daily. 30 tablet 11    isosorbide mononitrate (IMDUR) 30 MG 24 hr tablet Take 1 tablet by mouth once daily.      magnesium oxide (MAG-OX) 400 mg (241.3 mg magnesium) tablet Take 1 tablet (400 mg total) by mouth once daily. 30 tablet 2    magnesium oxide (MAGOX) 400 mg (241.3 mg magnesium) tablet Take 1 tablet by mouth once daily.      methocarbamoL (ROBAXIN) 750 MG Tab TAKE 1-2 TABLETS (750-1,500 MG TOTAL) BY MOUTH 3 (THREE) TIMES DAILY AS NEEDED (MUSCLE SPASMS). 180 tablet 1    NIFEdipine (ADALAT CC) 90 MG TbSR Take 1 tablet by mouth once daily.      NIFEdipine (PROCARDIA-XL) 90 MG (OSM) 24 hr tablet Take 1 tablet (90 mg total) by mouth once daily. 30 tablet 11    oxybutynin (DITROPAN-XL) 10 MG 24 hr tablet Take 1 tablet (10 mg total) by mouth once daily. 100 tablet 3    pen needle, diabetic (BD ULTRA-FINE SHORT PEN NEEDLE) 31 gauge x 5/16" Ndle USE WITH LANTUS DAILY, e 11.65 100 each 3    sodium bicarbonate 650 MG tablet Take 1 tablet (650 mg total) by mouth 3 (three) times daily. 90 tablet 11    SYNTHROID 50 mcg tablet TAKE 1 TABLET BY MOUTH BEFORE BREAKFAST. ADMINISTER ON AN EMPTY STOMACH AT LEAST 30 MINUTES BEFORE FOOD. IF RECEIVING TUBE FEEDS, " HOLD TUBE FEEDS FOR 1 HOUR BEFORE AND AFTER LEVOTHYROXINE ADMINISTRATION. 90 tablet 2    traZODone (DESYREL) 100 MG tablet TAKE 1 TABLET BY MOUTH NIGHTLY AS NEEDED FOR INSOMNIA. 90 tablet 2    HYDROcodone-acetaminophen (NORCO)  mg per tablet Take 1 tablet by mouth every 6 (six) hours as needed for Pain. 120 tablet 0    sumatriptan (IMITREX) 100 MG tablet Take 1 tablet (100 mg total) by mouth 2 (two) times daily as needed for Migraine. 30 tablet 1     No current facility-administered medications on file prior to visit.       Review of patient's allergies indicates:  No Known Allergies    Past Surgical History:   Procedure Laterality Date    BIOPSY OF URETER Left 2/18/2022    Procedure: BIOPSY, URETER;  Surgeon: Ronel Whittington MD;  [facility];  Service: Urology;  Laterality: Left;    BREAST BIOPSY Right     benign    CHOLECYSTECTOMY      COLONOSCOPY N/A 1/13/2017    Procedure: COLONOSCOPY;  Surgeon: Morris Wiseman MD;  Location: [facility] (4TH FLR);  Service: Endoscopy;  Laterality: N/A;  Renal pt Nephrology advised to avoid phosphate preps    CYSTOSCOPY N/A 10/8/2018    Procedure: CYSTOSCOPY;  Surgeon: Ronel Whittington MD;  [facility];  Service: Urology;  Laterality: N/A;  45 min    CYSTOSCOPY N/A 3/25/2019    Procedure: CYSTOSCOPY;  Surgeon: Ronel Whittington MD;  Location: [facility] OR [address] [place] [address];  Service: Urology;  Laterality: N/A;  45 min    CYSTOSCOPY N/A 8/26/2019    Procedure: CYSTOSCOPY;  Surgeon: Ronel Whittington MD;  [address];  Service: Urology;  Laterality: N/A;  45 min    CYSTOSCOPY N/A 7/2/2021    Procedure: CYSTOSCOPY;  Surgeon: Ronel Whittington MD;  [address];  Service: Urology;  Laterality: N/A;    CYSTOSCOPY  12/23/2021    Procedure: CYSTOSCOPY;  Surgeon: Ronel Whittington MD;  Location: [facility] OR [address] [place] [address];  Service: Urology;;    CYSTOSCOPY  2/18/2022    Procedure: CYSTOSCOPY;  Surgeon: Ronel FRAZIER  MD Pk;  Location: [facility] OR [address] [place] [address];  Service: Urology;;    CYSTOSCOPY W/ URETERAL STENT PLACEMENT Left 5/15/2021    Procedure: CYSTOSCOPY, WITH URETERAL STENT INSERTION;  Surgeon: Levy Sánchez Jr., MD;  Location: [address];  Service: Urology;  Laterality: Left;    CYSTOSCOPY WITH BIOPSY OF BLADDER N/A 1/27/2020    Procedure: CYSTOSCOPY, WITH BLADDER BIOPSY, WITH FULGURATION IF INDICATED;  Surgeon: Ronel Whittington MD;  Location: [address];  Service: Urology;  Laterality: N/A;    DILATION AND CURETTAGE OF UTERUS      GALLBLADDER SURGERY  2006    HYSTERECTOMY      INJECTION FOR SENTINEL NODE IDENTIFICATION Left 8/1/2019    Procedure: INJECTION, FOR SENTINEL NODE IDENTIFICATION;  Surgeon: Samia Fulton MD;  Location: [address];  Service: General;  Laterality: Left;    INJECTION OF BOTULINUM TOXIN TYPE A N/A 10/8/2018    Procedure: INJECTION, BOTULINUM TOXIN, TYPE A 300 UNITS;  Surgeon: Ronel Whittington MD;  Location: [address];  Service: Urology;  Laterality: N/A;    INJECTION OF BOTULINUM TOXIN TYPE A N/A 3/25/2019    Procedure: INJECTION, BOTULINUM TOXIN, TYPE A 300 UNITS;  Surgeon: Ronel Whittington MD;  Location: [address];  Service: Urology;  Laterality: N/A;    INJECTION OF BOTULINUM TOXIN TYPE A N/A 8/26/2019    Procedure: INJECTION, BOTULINUM TOXIN, TYPE A 300 UNITS;  Surgeon: Ronel Whittington MD;  Location: [address];  Service: Urology;  Laterality: N/A;    MASTECTOMY Left 8/1/2019    Procedure: MASTECTOMY 23 hour stay;  Surgeon: Samia Fulton MD;  Location: [facility] OR [address] [place] [address];  Service: General;  Laterality: Left;    MEDIPORT REMOVAL Right 6/24/2022    Procedure: REMOVAL, CATHETER, CENTRAL VENOUS, TUNNELED, WITH PORT;  Surgeon: Isauro Anderson MD;  Location: [facility] CATH LAB;  Service: Radiology;  Laterality: Right;    OVARIAN CYST SURGERY  1985    REPLACEMENT OF STENT Left 12/23/2021    Procedure: REPLACEMENT, STENT;  Surgeon: Ronel FRAZIER  MD Pk;  Location: [facility] OR 1ST FLR;  Service: Urology;  Laterality: Left;    REPLACEMENT OF STENT Left 2/18/2022    Procedure: REPLACEMENT, STENT;  Surgeon: Ronel Whittington MD;  Location: [facility] OR 1ST FLR;  Service: Urology;  Laterality: Left;    RETROGRADE PYELOGRAPHY Left 2/18/2022    Procedure: PYELOGRAM, RETROGRADE;  Surgeon: Ronel Whittington MD;  Location: [facility] OR 1ST FLR;  Service: Urology;  Laterality: Left;    SENTINEL LYMPH NODE BIOPSY Left 8/1/2019    Procedure: BIOPSY, LYMPH NODE, SENTINEL;  Surgeon: Samia Fulton MD;  Location: [facility] OR 2ND FLR;  Service: General;  Laterality: Left;    spt placement      TONSILLECTOMY, ADENOIDECTOMY      TOTAL ABDOMINAL HYSTERECTOMY W/ BILATERAL SALPINGOOPHORECTOMY  1985    URETEROSCOPY Left 2/18/2022    Procedure: URETEROSCOPY;  Surgeon: Ronel Whittington MD;  Location: [facility] OR [address] [place] [address];  Service: Urology;  Laterality: Left;       Family History   Problem Relation Age of Onset    Diabetes Sister     Kidney disease Sister         CKD III    ALS Mother         d.    Cancer Maternal Grandmother         d. colon    Cancer Paternal Grandfather         d. lung    Scoliosis Brother         increased pain    Prostate cancer Brother         cured s/p surgery    Cancer Brother         rare cancer that got into the bones    Diabetes Maternal Aunt     Kidney disease Maternal Aunt     Diabetes Maternal Uncle     Amblyopia Neg Hx     Blindness Neg Hx     Cataracts Neg Hx     Glaucoma Neg Hx     Macular degeneration Neg Hx     Retinal detachment Neg Hx     Strabismus Neg Hx        Social History     Socioeconomic History    Marital status:     Number of children: 0    Highest education level: Master's degree (e.g., MA, MS, Lelo, MEd, MSW, BANDAR)   Occupational History    Occupation: teacher     Comment: retired   Tobacco Use    Smoking status: Never    Smokeless tobacco: Never   Substance and Sexual Activity    Alcohol use: No     Alcohol/week: 0.0  standard drinks of alcohol    Drug use: No    Sexual activity: Not Currently     Birth control/protection: Abstinence   Social History Narrative    Single ()             Brother passed away last year.  Pt lives her her sister. (5/27)     Social Determinants of Health     Financial Resource Strain: Low Risk  (5/31/2023)    Overall Financial Resource Strain (CARDIA)     Difficulty of Paying Living Expenses: Not hard at all   Food Insecurity: No Food Insecurity (5/31/2023)    Hunger Vital Sign     Worried About Running Out of Food in the Last Year: Never true     Ran Out of Food in the Last Year: Never true   Transportation Needs: No Transportation Needs (5/31/2023)    PRAPARE - Transportation     Lack of Transportation (Medical): No     Lack of Transportation (Non-Medical): No   Physical Activity: Inactive (5/31/2023)    Exercise Vital Sign     Days of Exercise per Week: 0 days     Minutes of Exercise per Session: 0 min   Stress: No Stress Concern Present (5/31/2023)    [employer] - Occupational Stress Questionnaire     Feeling of Stress : Not at all   Social Connections: Moderately Isolated (5/31/2023)    Social Connection and Isolation Panel [NHANES]     Frequency of Communication with Friends and Family: More than three times a week     Frequency of Social Gatherings with Friends and Family: Twice a week     Attends [employer] Services: More than 4 times per year     Active Member of Clubs or Organizations: No     Attends Club or Organization Meetings: Never     Marital Status:    Housing Stability: Low Risk  (5/31/2023)    Housing Stability Vital Sign     Unable to Pay for Housing in the Last Year: No     Number of Places Lived in the Last Year: 1     Unstable Housing in the Last Year: No       Review of Systems   Constitutional: Negative for chills and fever.   Cardiovascular:  Positive for leg swelling. Negative for claudication.   Respiratory:  Negative for cough and  "shortness of breath.    Skin:  Positive for dry skin and nail changes. Negative for itching and rash.   Musculoskeletal:  Positive for arthritis, back pain, joint pain, myalgias and stiffness. Negative for falls, joint swelling and muscle weakness.   Gastrointestinal:  Negative for diarrhea, nausea and vomiting.   Neurological:  Positive for paresthesias. Negative for numbness, tremors and weakness.   Psychiatric/Behavioral:  Negative for altered mental status and hallucinations.            Objective:      Vitals:    10/26/23 1113   BP: 139/77   Pulse: 74   Weight: 113.4 kg (250 lb)   Height: 5' 8" (1.727 m)   PainSc: 0-No pain       Physical Exam  Vitals and nursing note reviewed.   Constitutional:       General: She is not in acute distress.     Appearance: She is well-developed. She is not toxic-appearing or diaphoretic.      Comments: alert and oriented x 3.    Cardiovascular:      Pulses:           Dorsalis pedis pulses are 1+ on the right side and 1+ on the left side.      Comments: Posterior tibial pulses are diminished Bilaterally. Toes are cool to touch. Feet are warm proximally.There is decreased digital hair. Skin is atrophic, slightly hyperpigmented  Pulmonary:      Effort: No respiratory distress.   Musculoskeletal:         General: No deformity.      Right lower le+ Edema present.      Left lower le+ Edema present.      Right ankle: No tenderness. No lateral malleolus, medial malleolus, AITF ligament, CF ligament or posterior TF ligament tenderness.      Right Achilles Tendon: No defects. Jamil's test negative.      Left ankle: No tenderness. No lateral malleolus, medial malleolus, AITF ligament, CF ligament or posterior TF ligament tenderness.      Left Achilles Tendon: No defects. Jamil's test negative.      Right foot: No tenderness or bony tenderness.      Left foot: No tenderness or bony tenderness.      Comments: Adequate joint range of motion without pain, limitation, nor " crepitation Bilateral feet and ankle joints. Muscle strength is 5/5 in all groups bilaterally.           Feet:      Right foot:      Protective Sensation: 5 sites tested.  5 sites sensed.      Skin integrity: Skin integrity normal.      Left foot:      Protective Sensation: 5 sites tested.  5 sites sensed.      Skin integrity: Skin integrity normal.   Lymphadenopathy:      Comments: No lymphatic streaking     Skin:     General: Skin is warm and dry.      Coloration: Skin is not pale.      Findings: No rash.      Nails: There is no clubbing.      Comments: Toenails 1-5 bilaterally are elongated by 2-3 mm, thickened by 2-3 mm, discolored/yellowed, dystrophic, brittle with subungual debris. Diffuse xerosis noted to plantar aspects of b/l foot/heels.      Neurological:      Sensory: Sensory deficit present.      Motor: No atrophy.      Comments: Light touch, proprioception, and sharp/dull sensation are all intact bilaterally. Protective threshold with the [place] Michael-Wienstein monofilament is intact bilaterally. Subjective paresthesias with no clearly identifiable source or trigger.      Psychiatric:         Attention and Perception: She is attentive.         Mood and Affect: Mood is not anxious. Affect is not inappropriate.         Speech: She is communicative. Speech is not slurred.         Behavior: Behavior is not combative.               Assessment:       Encounter Diagnoses   Name Primary?    Type II diabetes mellitus with neurological manifestations Yes    PVD (peripheral vascular disease)     Bilateral lower extremity edema     Onychomycosis due to dermatophyte            Plan:     I counseled the patient on her conditions, their implications and medical management.     - Shoe inspection. Diabetic Foot Education. Patient reminded of the importance of good nutrition and blood sugar control to help prevent podiatric complications of diabetes. Patient instructed on proper foot hygeine. We discussed wearing proper shoe  gear, daily foot inspections, never walking without protective shoe gear, caution putting sharp instruments to feet     - Discussed DM foot care:  Wear comfortable, proper fitting shoes. Wash feet daily. Dry well. After drying, apply moisturizer to feet (no lotion to webspaces). Inspect feet daily for skin breaks, blisters, swelling, or redness. Wear cotton socks (preferably white)  Change socks every day. Do NOT walk barefoot. Do NOT use heating pads or warm/hot water soaks     With patient's permission, nails were aggressively reduced and debrided 1-5 b/l, removing all offending nail and debris. Patient tolerated this well and no blood was drawn. Patient reports relief following the procedure.     Long discussion with patient regarding appropriate, supportive and comfortable shoes. Recommended supportive style shoe brands with adequate arch supports to alleviate abnormal pressure and improve stability of foot while walking. Avoid flat shoes and barefoot walking as these will exacerbate or worsen symptoms.     RTC 3-4 months, sooner PRN

## 2023-10-26 NOTE — PROGRESS NOTES
CHIEF COMPLAINT:  UTI      HISTORY OF PRESENTING ILLINESS:  Cecile Bowen is a 73 y.o. diabetic female with history of bilateral hydronephrosis, incomplete bladder emptying which is managed by SPT (16fr). Presents today due to chills, sediment in urine, cloudy urine x 2 weeks. She has been having SPT changes with [facility], insurance has stopped covering this service. She has an apt scheduled for monthly SPT changes next week with JIMMY Ken NP. She has had SPT in place for about 8 years. States she does not get recurrent UTIs.    S/p Cystoscopy with bladder botox injection (300u) 2018 with Dr. Whittington.     2021 found to have a left proximal ureteral stone, ZAK, and UTI; stent was place.  2021 cysto and left stent exchange; unable to have laser litho due to bacteremia.  2021 cysto with Left stent exchange.  2022 Left URS, cysto, stent exchange (now with strings). Ureteral bx; SPT exchange.     She is here today for SPT exchange.   [facility] had been seeing her but HH orders have .  Would like for them to continue the service  She is unable to walk, drive, or use her motorized scooter.  Due to her condition at high risks for Viral infections and falls.     She has been diagnosed with breast cancer; s/p Mastectomy 2019  Malignant neoplasm of upper-outer quadrant of left breast in female, estrogen receptor positive   No need for chemo/radiation therapy.         REVIEW OF SYSTEMS:  Review of Systems   Constitutional:  Positive for chills. Negative for fever.   HENT:  Negative for congestion and sore throat.    Respiratory:  Negative for cough and shortness of breath.    Cardiovascular:  Negative for chest pain and palpitations.   Gastrointestinal:  Negative for nausea and vomiting.   Genitourinary:  Negative for flank pain.   Musculoskeletal:         WC bound   Neurological:  Negative for dizziness and headaches.         PATIENT HISTORY:    Past Medical  History:   Diagnosis Date    Cervicogenic migraine     Chronic pain     CKD (chronic kidney disease) stage 4, GFR 15-29 ml/min     Dr Piedadmoody    CKD (chronic kidney disease) stage 4, GFR 15-29 ml/min     Diabetes mellitus     Long term use of Insulin, Diabetic Neuropathy    Dialysis patient 1/21/2022    Fibromyalgia     Hydronephrosis     Hyperlipidemia     Hypertension 12/12/2012    Hypothyroidism 12/12/2012    ARON (iron deficiency anemia)     Insomnia     Levoscoliosis     Malignant neoplasm of upper-outer quadrant of left breast in female, estrogen receptor positive     Metabolic acidosis     Mobility impaired     Nuclear sclerosis - Both Eyes 3/24/2014    VIJAYA (obstructive sleep apnea)     Osteopenia     Pulmonary nodule     Recurrent UTI     Renal manifestation of secondary diabetes mellitus     Secondary hyperparathyroidism, renal     Urinary retention        Past Surgical History:   Procedure Laterality Date    BIOPSY OF URETER Left 2/18/2022    Procedure: BIOPSY, URETER;  Surgeon: Ronel Whittington MD;  Location: [facility] OR [address] [place] [address];  Service: Urology;  Laterality: Left;    BREAST BIOPSY Right     benign    CHOLECYSTECTOMY      COLONOSCOPY N/A 1/13/2017    Procedure: COLONOSCOPY;  Surgeon: Morris Wiseman MD;  Location: [facility] (4TH FLR);  Service: Endoscopy;  Laterality: N/A;  Renal pt Nephrology advised to avoid phosphate preps    CYSTOSCOPY N/A 10/8/2018    Procedure: CYSTOSCOPY;  Surgeon: Ronel Whittington MD;  Location: [facility] OR [address] [place] [address];  Service: Urology;  Laterality: N/A;  45 min    CYSTOSCOPY N/A 3/25/2019    Procedure: CYSTOSCOPY;  Surgeon: Ronel Whittington MD;  Location: [facility] OR [address] [place] [address];  Service: Urology;  Laterality: N/A;  45 min    CYSTOSCOPY N/A 8/26/2019    Procedure: CYSTOSCOPY;  Surgeon: Ronel Whittington MD;  Location: [address];  Service: Urology;  Laterality: N/A;  45 min    CYSTOSCOPY N/A 7/2/2021    Procedure: CYSTOSCOPY;  Surgeon: Ronel Whittington MD;   [facility];  Service: Urology;  Laterality: N/A;    CYSTOSCOPY  12/23/2021    Procedure: CYSTOSCOPY;  Surgeon: Ronel Whittington MD;  [facility];  Service: Urology;;    CYSTOSCOPY  2/18/2022    Procedure: CYSTOSCOPY;  Surgeon: Ronel Whittington MD;  [facility];  Service: Urology;;    CYSTOSCOPY W/ URETERAL STENT PLACEMENT Left 5/15/2021    Procedure: CYSTOSCOPY, WITH URETERAL STENT INSERTION;  Surgeon: Levy Sánchez Jr., MD;  Location: [facility] OR [address] [place] [address];  Service: Urology;  Laterality: Left;    CYSTOSCOPY WITH BIOPSY OF BLADDER N/A 1/27/2020    Procedure: CYSTOSCOPY, WITH BLADDER BIOPSY, WITH FULGURATION IF INDICATED;  Surgeon: Ronel Whittington MD;  Location: [facility] OR [address] [place] [address];  Service: Urology;  Laterality: N/A;    DILATION AND CURETTAGE OF UTERUS      GALLBLADDER SURGERY  2006    HYSTERECTOMY      INJECTION FOR SENTINEL NODE IDENTIFICATION Left 8/1/2019    Procedure: INJECTION, FOR SENTINEL NODE IDENTIFICATION;  Surgeon: Samia Fulton MD;  Location: [facility] OR [address] [place] [address];  Service: General;  Laterality: Left;    INJECTION OF BOTULINUM TOXIN TYPE A N/A 10/8/2018    Procedure: INJECTION, BOTULINUM TOXIN, TYPE A 300 UNITS;  Surgeon: Ronel Whittington MD;  Location: [facility] OR [address] [place] [address];  Service: Urology;  Laterality: N/A;    INJECTION OF BOTULINUM TOXIN TYPE A N/A 3/25/2019    Procedure: INJECTION, BOTULINUM TOXIN, TYPE A 300 UNITS;  Surgeon: Ronel Whittington MD;  Location: [facility] OR [address] [place] [address];  Service: Urology;  Laterality: N/A;    INJECTION OF BOTULINUM TOXIN TYPE A N/A 8/26/2019    Procedure: INJECTION, BOTULINUM TOXIN, TYPE A 300 UNITS;  Surgeon: Ronel Whittington MD;  Location: [facility] OR [address] [place] [address];  Service: Urology;  Laterality: N/A;    MASTECTOMY Left 8/1/2019    Procedure: MASTECTOMY 23 hour stay;  Surgeon: Samia Fulton MD;  Location: [facility] OR [address] [place] [address];  Service: General;  Laterality: Left;    MEDIPORT REMOVAL Right 6/24/2022    Procedure: REMOVAL,  CATHETER, CENTRAL VENOUS, TUNNELED, WITH PORT;  Surgeon: Isauro Anderson MD;  Location: [facility] CATH LAB;  Service: Radiology;  Laterality: Right;    OVARIAN CYST SURGERY  1985    REPLACEMENT OF STENT Left 12/23/2021    Procedure: REPLACEMENT, STENT;  Surgeon: Ronel Whittington MD;  [facility];  Service: Urology;  Laterality: Left;    REPLACEMENT OF STENT Left 2/18/2022    Procedure: REPLACEMENT, STENT;  Surgeon: Ronel Whittington MD;  [facility];  Service: Urology;  Laterality: Left;    RETROGRADE PYELOGRAPHY Left 2/18/2022    Procedure: PYELOGRAM, RETROGRADE;  Surgeon: Ronel Whittington MD;  [facility];  Service: Urology;  Laterality: Left;    SENTINEL LYMPH NODE BIOPSY Left 8/1/2019    Procedure: BIOPSY, LYMPH NODE, SENTINEL;  Surgeon: Samia Fulton MD;  Location: [facility] OR [address] [place] [address];  Service: General;  Laterality: Left;    spt placement      TONSILLECTOMY, ADENOIDECTOMY      TOTAL ABDOMINAL HYSTERECTOMY W/ BILATERAL SALPINGOOPHORECTOMY  1985    URETEROSCOPY Left 2/18/2022    Procedure: URETEROSCOPY;  Surgeon: Ronel Whittington MD;  Location: [facility] OR [address] [place] [address];  Service: Urology;  Laterality: Left;       Family History   Problem Relation Age of Onset    Diabetes Sister     Kidney disease Sister         CKD III    ALS Mother         d.    Cancer Maternal Grandmother         d. colon    Cancer Paternal Grandfather         d. lung    Scoliosis Brother         increased pain    Prostate cancer Brother         cured s/p surgery    Cancer Brother         rare cancer that got into the bones    Diabetes Maternal Aunt     Kidney disease Maternal Aunt     Diabetes Maternal Uncle     Amblyopia Neg Hx     Blindness Neg Hx     Cataracts Neg Hx     Glaucoma Neg Hx     Macular degeneration Neg Hx     Retinal detachment Neg Hx     Strabismus Neg Hx        Social History     Socioeconomic History    Marital status:     Number of children: 0    Highest education  level: Master's degree (e.g., MA, MS, Lelo, MEd, MSW, BANDAR)   Occupational History    Occupation: teacher     Comment: retired   Tobacco Use    Smoking status: Never    Smokeless tobacco: Never   Substance and Sexual Activity    Alcohol use: No     Alcohol/week: 0.0 standard drinks of alcohol    Drug use: No    Sexual activity: Not Currently     Birth control/protection: Abstinence   Social History Narrative    Single ()             Brother passed away last year.  Pt lives her her sister. (5/27)     Social Determinants of Health     Financial Resource Strain: Low Risk  (5/31/2023)    Overall Financial Resource Strain (CARDIA)     Difficulty of Paying Living Expenses: Not hard at all   Food Insecurity: No Food Insecurity (5/31/2023)    Hunger Vital Sign     Worried About Running Out of Food in the Last Year: Never true     Ran Out of Food in the Last Year: Never true   Transportation Needs: No Transportation Needs (5/31/2023)    PRAPARE - Transportation     Lack of Transportation (Medical): No     Lack of Transportation (Non-Medical): No   Physical Activity: Inactive (5/31/2023)    Exercise Vital Sign     Days of Exercise per Week: 0 days     Minutes of Exercise per Session: 0 min   Stress: No Stress Concern Present (5/31/2023)    [employer] [place] - Occupational Stress Questionnaire     Feeling of Stress : Not at all   Social Connections: Moderately Isolated (5/31/2023)    Social Connection and Isolation Panel [NHANES]     Frequency of Communication with Friends and Family: More than three times a week     Frequency of Social Gatherings with Friends and Family: Twice a week     Attends Restorationism Services: More than 4 times per year     Active Member of Clubs or Organizations: No     Attends Club or Organization Meetings: Never     Marital Status:    Housing Stability: Low Risk  (5/31/2023)    Housing Stability Vital Sign     Unable to Pay for Housing in the Last Year: No      "Number of Places Lived in the Last Year: 1     Unstable Housing in the Last Year: No       Allergies:  Patient has no known allergies.    Medications:    Current Outpatient Medications:     amLODIPine (NORVASC) 5 MG tablet, Take 5 mg by mouth once daily., Disp: , Rfl:     anastrozole (ARIMIDEX) 1 mg Tab, Take 1 tablet (1 mg total) by mouth once daily., Disp: 90 tablet, Rfl: 2    apixaban (ELIQUIS) 5 mg Tab, Take 1 tablet (5 mg total) by mouth 2 (two) times daily., Disp: 180 tablet, Rfl: 3    apixaban (ELIQUIS) 5 mg Tab, Take 1 tablet by mouth 2 (two) times daily., Disp: , Rfl:     atorvastatin (LIPITOR) 80 MG tablet, Take 1 tablet (80 mg total) by mouth once daily., Disp: 90 tablet, Rfl: 3    BD INSULIN SYRINGE ULTRA-FINE 0.5 mL 31 gauge x 5/16" Syrg, USE WITH INSULIN 4 TIMES A DAY, Disp: 100 each, Rfl: 12    calcium acetate 667 mg Tab, 1 capsule 3 TIMES DAILY (route: oral), Disp: , Rfl:     calcium acetate,phosphat bind, (PHOSLO) 667 mg capsule, Take by mouth 3 (three) times daily., Disp: , Rfl:     calcium acetate,phosphat bind, (PHOSLO) 667 mg tablet, Take 1 tablet (667 mg total) by mouth 3 (three) times daily with meals., Disp: 90 tablet, Rfl: 11    carvediloL (COREG) 6.25 MG tablet, Take 1 tablet (6.25 mg total) by mouth 2 (two) times daily., Disp: 60 tablet, Rfl: 11    DULoxetine (CYMBALTA) 20 MG capsule, Take 20 mg by mouth 2 (two) times daily., Disp: , Rfl:     erenumab-aooe (AIMOVIG AUTOINJECTOR) 140 mg/mL autoinjector, Inject 1 mL (140 mg total) into the skin every 30 days. No further refills without appointment, Disp: 1 mL, Rfl: 2    erenumab-aooe (AIMOVIG AUTOINJECTOR) 140 mg/mL autoinjector, 140 mg MONTHLY (route: subcutaneous), Disp: , Rfl:     ergocalciferol (ERGOCALCIFEROL) 50,000 unit Cap, TAKE ONE CAPSULE BY MOUTH ONE TIME PER WEEK, TAKES ON TUESDAYS, Disp: 12 capsule, Rfl: 3    furosemide (LASIX) 40 MG tablet, Take 1 tablet (40 mg total) by mouth once daily., Disp: 30 tablet, Rfl: 11    furosemide " "(LASIX) 40 MG tablet, Take 1 tablet by mouth once daily., Disp: , Rfl:     GAVILYTE-C 240-22.72-6.72 -5.84 gram SolR, Take 4,000 mLs by mouth once., Disp: , Rfl:     HYDROcodone-acetaminophen (NORCO)  mg per tablet, Take 1 tablet by mouth every 6 (six) hours as needed for Pain., Disp: 120 tablet, Rfl: 0    insulin (LANTUS SOLOSTAR U-100 INSULIN) glargine 100 units/mL SubQ pen, INJECT 14-15 UNITS UNDER THE SKIN ONCE DAILY (Patient taking differently: INJECT 14-16 UNITS UNDER THE SKIN ONCE DAILY), Disp: 15 each, Rfl: 3    isosorbide mononitrate (IMDUR) 30 MG 24 hr tablet, Take 1 tablet (30 mg total) by mouth once daily., Disp: 30 tablet, Rfl: 11    isosorbide mononitrate (IMDUR) 30 MG 24 hr tablet, Take 1 tablet by mouth once daily., Disp: , Rfl:     magnesium oxide (MAG-OX) 400 mg (241.3 mg magnesium) tablet, Take 1 tablet (400 mg total) by mouth once daily., Disp: 30 tablet, Rfl: 2    magnesium oxide (MAGOX) 400 mg (241.3 mg magnesium) tablet, Take 1 tablet by mouth once daily., Disp: , Rfl:     methocarbamoL (ROBAXIN) 750 MG Tab, TAKE 1-2 TABLETS (750-1,500 MG TOTAL) BY MOUTH 3 (THREE) TIMES DAILY AS NEEDED (MUSCLE SPASMS)., Disp: 180 tablet, Rfl: 1    NIFEdipine (ADALAT CC) 90 MG TbSR, Take 1 tablet by mouth once daily., Disp: , Rfl:     NIFEdipine (PROCARDIA-XL) 90 MG (OSM) 24 hr tablet, Take 1 tablet (90 mg total) by mouth once daily., Disp: 30 tablet, Rfl: 11    oxybutynin (DITROPAN-XL) 10 MG 24 hr tablet, Take 1 tablet (10 mg total) by mouth once daily., Disp: 100 tablet, Rfl: 3    pen needle, diabetic (BD ULTRA-FINE SHORT PEN NEEDLE) 31 gauge x 5/16" Ndle, USE WITH LANTUS DAILY, e 11.65, Disp: 100 each, Rfl: 3    sodium bicarbonate 650 MG tablet, Take 1 tablet (650 mg total) by mouth 3 (three) times daily., Disp: 90 tablet, Rfl: 11    sumatriptan (IMITREX) 100 MG tablet, Take 1 tablet (100 mg total) by mouth 2 (two) times daily as needed for Migraine., Disp: 30 tablet, Rfl: 1    SYNTHROID 50 mcg tablet, " TAKE 1 TABLET BY MOUTH BEFORE BREAKFAST. ADMINISTER ON AN EMPTY STOMACH AT LEAST 30 MINUTES BEFORE FOOD. IF RECEIVING TUBE FEEDS, HOLD TUBE FEEDS FOR 1 HOUR BEFORE AND AFTER LEVOTHYROXINE ADMINISTRATION., Disp: 90 tablet, Rfl: 2    traZODone (DESYREL) 100 MG tablet, TAKE 1 TABLET BY MOUTH NIGHTLY AS NEEDED FOR INSOMNIA., Disp: 90 tablet, Rfl: 2    PHYSICAL EXAMINATION:  Physical Exam  Constitutional:       Appearance: She is obese.   HENT:      Head: Normocephalic and atraumatic.      Right Ear: External ear normal.      Left Ear: External ear normal.   Pulmonary:      Effort: Pulmonary effort is normal. No respiratory distress.   Abdominal:      Comments: SPT patent, draining well, no erythemal, no granulation tissue.  Urine with sediment.   Musculoskeletal:      Comments: In WC   Skin:     General: Skin is warm and dry.   Neurological:      General: No focal deficit present.      Mental Status: She is alert and oriented to person, place, and time.   Psychiatric:         Mood and Affect: Mood normal.         Behavior: Behavior normal.         Lab Results   Component Value Date    CREATININE 2.1 (H) 10/10/2023    EGFRNORACEVR 24.7 (A) 10/10/2023             IMPRESSION:  Encounter Diagnoses   Name Primary?    Encounter for care or replacement of suprapubic tube Yes    Urinary retention     Bacterial UTI          Assessment:       1. Encounter for care or replacement of suprapubic tube    2. Urinary retention    3. Bacterial UTI        Plan:   Name,  and allergies verified  I removed existing SPT without any difficulty and properly disposed, dirty gloves removed, hand hygiene performed.   Stoma cleaned with betadine.  I easily placed a 16 fr SPT using sterile technique. Urine collected for culture. Patient would like to wait on culture and sensitivity prior to beginning antibiotic tx.  SPT flushed with 20 ml sterile water to confirm placement.  Clear yellow urine received and balloon inflated by with 10 ml sterile  water.   Tolerated well.  Site cleaned.   Daily skin care and suprapubic catheter care discussed.  Educational materials given.  Increase water intake to help with sediment.   RTC 4 weeks for next SPT change.  Voiced understanding.       I spent 30 minutes with the patient of which more than half was spent in direct consultation with the patient in regards to our treatment and plan.  We addressed the office findings and recent labs.   Education and recommendations of today's plan of care including home remedies and needed follow up with PCP.   We discussed the chief complaint; reviewed the LUTS and the possible contributory factors.   Reviewed management  Recommended lifestyle modifications with a proper, healthy diet, good hydration but during the day. Reducing bladder irritants.   Benefits of regular exercise.

## 2023-10-27 ENCOUNTER — PES CALL (OUTPATIENT)
Dept: HOME HEALTH SERVICES | Facility: CLINIC | Age: 73
End: 2023-10-27
Payer: MEDICARE

## 2023-10-27 RX ORDER — TRAZODONE HYDROCHLORIDE 100 MG/1
TABLET ORAL
Qty: 90 TABLET | Refills: 2 | Status: SHIPPED | OUTPATIENT
Start: 2023-10-27

## 2023-10-27 RX ORDER — HYDROCODONE BITARTRATE AND ACETAMINOPHEN 10; 325 MG/1; MG/1
1 TABLET ORAL EVERY 6 HOURS PRN
Qty: 120 TABLET | Refills: 0 | Status: SHIPPED | OUTPATIENT
Start: 2023-10-27 | End: 2023-12-04 | Stop reason: SDUPTHER

## 2023-10-30 ENCOUNTER — TELEPHONE (OUTPATIENT)
Dept: UROLOGY | Facility: CLINIC | Age: 73
End: 2023-10-30
Payer: MEDICARE

## 2023-10-30 ENCOUNTER — PATIENT MESSAGE (OUTPATIENT)
Dept: UROLOGY | Facility: CLINIC | Age: 73
End: 2023-10-30
Payer: MEDICARE

## 2023-10-30 DIAGNOSIS — A49.9 BACTERIAL UTI: Primary | ICD-10-CM

## 2023-10-30 DIAGNOSIS — Z93.59 CHRONIC SUPRAPUBIC CATHETER: ICD-10-CM

## 2023-10-30 DIAGNOSIS — N31.9 NEUROGENIC BLADDER: Primary | ICD-10-CM

## 2023-10-30 DIAGNOSIS — Z74.09 IMMOBILITY: ICD-10-CM

## 2023-10-30 DIAGNOSIS — N39.0 BACTERIAL UTI: Primary | ICD-10-CM

## 2023-10-30 LAB — BACTERIA UR CULT: ABNORMAL

## 2023-10-30 RX ORDER — CIPROFLOXACIN 500 MG/1
500 TABLET ORAL EVERY 12 HOURS
Qty: 14 TABLET | Refills: 0 | Status: SHIPPED | OUTPATIENT
Start: 2023-10-30 | End: 2023-10-30

## 2023-10-30 RX ORDER — CIPROFLOXACIN 500 MG/1
500 TABLET ORAL DAILY
Qty: 7 TABLET | Refills: 0 | Status: SHIPPED | OUTPATIENT
Start: 2023-10-30 | End: 2023-11-06

## 2023-10-30 NOTE — TELEPHONE ENCOUNTER
Spoke with patient about urine culture result. 7 day course of ciprofloxacin sent to pharmacy on file, HD adjustment.

## 2023-10-31 ENCOUNTER — OFFICE VISIT (OUTPATIENT)
Dept: HEPATOLOGY | Facility: CLINIC | Age: 73
End: 2023-10-31
Payer: MEDICARE

## 2023-10-31 ENCOUNTER — LAB VISIT (OUTPATIENT)
Dept: LAB | Facility: HOSPITAL | Age: 73
End: 2023-10-31
Payer: MEDICARE

## 2023-10-31 VITALS — BODY MASS INDEX: 34.86 KG/M2 | HEIGHT: 68 IN | WEIGHT: 230 LBS

## 2023-10-31 DIAGNOSIS — E11.69 HYPERLIPIDEMIA ASSOCIATED WITH TYPE 2 DIABETES MELLITUS: Chronic | ICD-10-CM

## 2023-10-31 DIAGNOSIS — Z99.2 DEPENDENCE ON HEMODIALYSIS: ICD-10-CM

## 2023-10-31 DIAGNOSIS — R79.89 ELEVATED LFTS: ICD-10-CM

## 2023-10-31 DIAGNOSIS — E78.5 HYPERLIPIDEMIA ASSOCIATED WITH TYPE 2 DIABETES MELLITUS: Chronic | ICD-10-CM

## 2023-10-31 DIAGNOSIS — K76.0 FATTY LIVER DISEASE, NONALCOHOLIC: ICD-10-CM

## 2023-10-31 DIAGNOSIS — N18.6 END STAGE RENAL DISEASE: ICD-10-CM

## 2023-10-31 DIAGNOSIS — E11.69 HYPERLIPIDEMIA ASSOCIATED WITH TYPE 2 DIABETES MELLITUS: ICD-10-CM

## 2023-10-31 DIAGNOSIS — E78.5 HYPERLIPIDEMIA ASSOCIATED WITH TYPE 2 DIABETES MELLITUS: ICD-10-CM

## 2023-10-31 DIAGNOSIS — E66.9 CLASS 1 OBESITY WITH SERIOUS COMORBIDITY AND BODY MASS INDEX (BMI) OF 34.0 TO 34.9 IN ADULT, UNSPECIFIED OBESITY TYPE: ICD-10-CM

## 2023-10-31 DIAGNOSIS — R79.89 ELEVATED LFTS: Primary | ICD-10-CM

## 2023-10-31 PROBLEM — I15.2 HYPERTENSION SECONDARY TO ENDOCRINE DISORDERS: Status: ACTIVE | Noted: 2022-08-25

## 2023-10-31 PROBLEM — Z87.440 PERSONAL HISTORY OF URINARY (TRACT) INFECTIONS: Status: ACTIVE | Noted: 2023-08-21

## 2023-10-31 PROBLEM — E66.811 CLASS 1 OBESITY WITH SERIOUS COMORBIDITY AND BODY MASS INDEX (BMI) OF 34.0 TO 34.9 IN ADULT: Status: ACTIVE | Noted: 2017-01-09

## 2023-10-31 LAB
ALBUMIN SERPL BCP-MCNC: 3 G/DL (ref 3.5–5.2)
ALP SERPL-CCNC: 384 U/L (ref 55–135)
ALT SERPL W/O P-5'-P-CCNC: 130 U/L (ref 10–44)
AST SERPL-CCNC: 137 U/L (ref 10–40)
BILIRUB DIRECT SERPL-MCNC: 0.1 MG/DL (ref 0.1–0.3)
BILIRUB SERPL-MCNC: 0.3 MG/DL (ref 0.1–1)
PROT SERPL-MCNC: 6.6 G/DL (ref 6–8.4)

## 2023-10-31 PROCEDURE — 36415 COLL VENOUS BLD VENIPUNCTURE: CPT | Mod: HCNC | Performed by: NURSE PRACTITIONER

## 2023-10-31 PROCEDURE — 3044F PR MOST RECENT HEMOGLOBIN A1C LEVEL <7.0%: ICD-10-PCS | Mod: HCNC,CPTII,S$GLB, | Performed by: NURSE PRACTITIONER

## 2023-10-31 PROCEDURE — 1159F PR MEDICATION LIST DOCUMENTED IN MEDICAL RECORD: ICD-10-PCS | Mod: HCNC,CPTII,S$GLB, | Performed by: NURSE PRACTITIONER

## 2023-10-31 PROCEDURE — 1160F RVW MEDS BY RX/DR IN RCRD: CPT | Mod: HCNC,CPTII,S$GLB, | Performed by: NURSE PRACTITIONER

## 2023-10-31 PROCEDURE — 1101F PT FALLS ASSESS-DOCD LE1/YR: CPT | Mod: HCNC,CPTII,S$GLB, | Performed by: NURSE PRACTITIONER

## 2023-10-31 PROCEDURE — 3288F FALL RISK ASSESSMENT DOCD: CPT | Mod: HCNC,CPTII,S$GLB, | Performed by: NURSE PRACTITIONER

## 2023-10-31 PROCEDURE — 1160F PR REVIEW ALL MEDS BY PRESCRIBER/CLIN PHARMACIST DOCUMENTED: ICD-10-PCS | Mod: HCNC,CPTII,S$GLB, | Performed by: NURSE PRACTITIONER

## 2023-10-31 PROCEDURE — 80076 HEPATIC FUNCTION PANEL: CPT | Mod: HCNC | Performed by: NURSE PRACTITIONER

## 2023-10-31 PROCEDURE — 3044F HG A1C LEVEL LT 7.0%: CPT | Mod: HCNC,CPTII,S$GLB, | Performed by: NURSE PRACTITIONER

## 2023-10-31 PROCEDURE — 3008F BODY MASS INDEX DOCD: CPT | Mod: HCNC,CPTII,S$GLB, | Performed by: NURSE PRACTITIONER

## 2023-10-31 PROCEDURE — 3066F PR DOCUMENTATION OF TREATMENT FOR NEPHROPATHY: ICD-10-PCS | Mod: HCNC,CPTII,S$GLB, | Performed by: NURSE PRACTITIONER

## 2023-10-31 PROCEDURE — 99214 PR OFFICE/OUTPT VISIT, EST, LEVL IV, 30-39 MIN: ICD-10-PCS | Mod: HCNC,S$GLB,, | Performed by: NURSE PRACTITIONER

## 2023-10-31 PROCEDURE — 3066F NEPHROPATHY DOC TX: CPT | Mod: HCNC,CPTII,S$GLB, | Performed by: NURSE PRACTITIONER

## 2023-10-31 PROCEDURE — 1125F AMNT PAIN NOTED PAIN PRSNT: CPT | Mod: HCNC,CPTII,S$GLB, | Performed by: NURSE PRACTITIONER

## 2023-10-31 PROCEDURE — 3008F PR BODY MASS INDEX (BMI) DOCUMENTED: ICD-10-PCS | Mod: HCNC,CPTII,S$GLB, | Performed by: NURSE PRACTITIONER

## 2023-10-31 PROCEDURE — 99999 PR PBB SHADOW E&M-EST. PATIENT-LVL V: CPT | Mod: PBBFAC,HCNC,, | Performed by: NURSE PRACTITIONER

## 2023-10-31 PROCEDURE — 1101F PR PT FALLS ASSESS DOC 0-1 FALLS W/OUT INJ PAST YR: ICD-10-PCS | Mod: HCNC,CPTII,S$GLB, | Performed by: NURSE PRACTITIONER

## 2023-10-31 PROCEDURE — 99999 PR PBB SHADOW E&M-EST. PATIENT-LVL V: ICD-10-PCS | Mod: PBBFAC,HCNC,, | Performed by: NURSE PRACTITIONER

## 2023-10-31 PROCEDURE — 99214 OFFICE O/P EST MOD 30 MIN: CPT | Mod: HCNC,S$GLB,, | Performed by: NURSE PRACTITIONER

## 2023-10-31 PROCEDURE — 1125F PR PAIN SEVERITY QUANTIFIED, PAIN PRESENT: ICD-10-PCS | Mod: HCNC,CPTII,S$GLB, | Performed by: NURSE PRACTITIONER

## 2023-10-31 PROCEDURE — 3072F PR LOW RISK FOR RETINOPATHY: ICD-10-PCS | Mod: HCNC,CPTII,S$GLB, | Performed by: NURSE PRACTITIONER

## 2023-10-31 PROCEDURE — 3072F LOW RISK FOR RETINOPATHY: CPT | Mod: HCNC,CPTII,S$GLB, | Performed by: NURSE PRACTITIONER

## 2023-10-31 PROCEDURE — 3288F PR FALLS RISK ASSESSMENT DOCUMENTED: ICD-10-PCS | Mod: HCNC,CPTII,S$GLB, | Performed by: NURSE PRACTITIONER

## 2023-10-31 PROCEDURE — 1159F MED LIST DOCD IN RCRD: CPT | Mod: HCNC,CPTII,S$GLB, | Performed by: NURSE PRACTITIONER

## 2023-10-31 NOTE — PROGRESS NOTES
[facility] New Patient Visit    Reason for Visit: Elevated liver enzymes    PCP: Lurdes Navarro    HPI:  This is a 73 y.o. female with PMH noted below, here for evaluation of elevated liver enzymes.     The patient's risk factors for fatty liver disease include:     Obesity                                        Yes; BMI 34.97  Dyslipidemia                                Yes; On Atorvastatin 80 mg daily - Refer to most recent lipid panel results below:   Latest Reference Range & Units 08/21/23 00:00   Cholesterol Total 0 - 199 mg/dL 147   HDL mg/dL 42   HDL/Cholesterol Ratio 0 - 4.5  3.5   LDL Cholesterol External 0 - 99 mg/dL 72   Triglycerides 0 - 149 mg/dL 166 (H)   VLDL 10 - 30 mg/dL 33 (H)     Insulin resistance/Diabetes         Yes; Last HgbA1c was 5.0% (10/2023)  Family history of diabetes           Yes    PMH is significant for ESRD, now on HD twice per week (hospitalized in August). Her liver enzymes were essentially normal prior to hospitalization. Refer to recent LFT trend as below:      Latest Reference Range & Units 09/06/23 00:00 09/12/23 13:28 09/26/23 11:02 10/06/23 00:00 10/10/23 10:01   ALP 55 - 135 U/L 130 (H) 116 269 (H) 154 (H) 282 (H)   PROTEIN TOTAL 6.0 - 8.4 g/dL  5.6 (L) 6.2  5.6 (L)   Albumin 3.5 - 5.2 g/dL  2.7 (L) 2.9 (L)  2.6 (L)   Albumin 3.5 - 5.2 g/dL 3.2 (L)   3.8    BILIRUBIN TOTAL 0.1 - 1.0 mg/dL  0.3 0.3  0.2   AST 10 - 40 U/L  12 26  242 (H)   ALT 10 - 44 U/L 10 8 (L) 44 11 144 (H)     She has not undergone any recent abdominal imaging. Prior US of the liver in 9/2021 showed:     FINDINGS:    Liver: Normal in size, measuring 16.8 cm. Fatty replaced no focal hepatic lesions.     Gallbladder: Patient is status post cholecystectomy.     Biliary system: The common duct is not dilated, measuring 11 mm.  No intrahepatic ductal dilatation.     Spleen: Normal in size and echotexture, measuring 11.4 x 3.4 cm.     Miscellaneous: No upper abdominal ascites.      Impression:     Fatty replacement of the liver.     Postsurgical dilatation of the common bile duct.     No other remarkable findings are appreciated.    She has never undergone a liver biopsy or non-invasive staging exam.    FIB-4 Calculation: 6.48 at 10/31/2023 12:00 PM   Calculated from:  Last SGOT/AST : 242 at 10/31/2023 12:00 PM  Last SGPT/ALT: 144 at 10/31/2023 12:00 PM   Platelets: 221 at 10/31/2023 12:00 PM   Age: 73 y.o.     FIB-4 below 1.30 is considered as low-risk for advanced fibrosis  FIB-4 over 2.67 is considered as high-risk for advanced fibrosis  FIB-4 values between 1.30 and 2.67 are considered as intermediate-risk of advanced fibrosis for ages 36-64.     For ages > 64 the cut-off for low-risk goes to < 2.  This is a screening tool and clinical judgement should be used in the interpretation of these results.    She has no known family history of liver disease. She denies any history of heavy alcohol use or illicit drug use. Viral hepatitis testing was negative in August. She has received 1 vaccination for Hepatitis B, through dialysis center. She has no signs or symptoms of hepatic decompensation including jaundice, dark urine, pruritus, abdominal distention, lower extremity edema, hematemesis, melena, or periods of confusion suggestive of hepatic encephalopathy.      PMHX:  has a past medical history of Cervicogenic migraine, Chronic pain, CKD (chronic kidney disease) stage 4, GFR 15-29 ml/min, CKD (chronic kidney disease) stage 4, GFR 15-29 ml/min, Diabetes mellitus, Dialysis patient (1/21/2022), Fibromyalgia, Hydronephrosis, Hyperlipidemia, Hypertension (12/12/2012), Hypothyroidism (12/12/2012), ARON (iron deficiency anemia), Insomnia, Levoscoliosis, Malignant neoplasm of upper-outer quadrant of left breast in female, estrogen receptor positive, Metabolic acidosis, Mobility impaired, Nuclear sclerosis - Both Eyes (3/24/2014), VIJAYA (obstructive sleep apnea), Osteopenia, Pulmonary nodule,  Recurrent UTI, Renal manifestation of secondary diabetes mellitus, Secondary hyperparathyroidism, renal, and Urinary retention.    PSHX:  has a past surgical history that includes Total abdominal hysterectomy w/ bilateral salpingoophorectomy (1985); Gallbladder surgery (2006); TONSILLECTOMY, ADENOIDECTOMY; Ovarian cyst surgery (1985); Dilation and curettage of uterus; Cholecystectomy; spt placement; Breast biopsy (Right); Colonoscopy (N/A, 1/13/2017); Cystoscopy (N/A, 10/8/2018); Injection of botulinum toxin type A (N/A, 10/8/2018); Cystoscopy (N/A, 3/25/2019); Injection of botulinum toxin type A (N/A, 3/25/2019); Hysterectomy; Mastectomy (Left, 8/1/2019); Injection for sentinel node identification (Left, 8/1/2019); [place] lymph node biopsy (Left, 8/1/2019); Cystoscopy (N/A, 8/26/2019); Injection of botulinum toxin type A (N/A, 8/26/2019); Cystoscopy with biopsy of bladder (N/A, 1/27/2020); Cystoscopy w/ ureteral stent placement (Left, 5/15/2021); Cystoscopy (N/A, 7/2/2021); Cystoscopy (12/23/2021); Replacement of stent (Left, 12/23/2021); Cystoscopy (2/18/2022); Retrograde pyelography (Left, 2/18/2022); Ureteroscopy (Left, 2/18/2022); Replacement of stent (Left, 2/18/2022); Biopsy of ureter (Left, 2/18/2022); and Mediport removal (Right, 6/24/2022).    The patient's social and family histories were reviewed by me and updated in the appropriate section of the electronic medical record.    Review of patient's allergies indicates:  No Known Allergies    Current Outpatient Medications on File Prior to Visit   Medication Sig Dispense Refill    amLODIPine (NORVASC) 5 MG tablet Take 5 mg by mouth once daily.      anastrozole (ARIMIDEX) 1 mg Tab Take 1 tablet (1 mg total) by mouth once daily. 90 tablet 2    apixaban (ELIQUIS) 5 mg Tab Take 1 tablet (5 mg total) by mouth 2 (two) times daily. 180 tablet 3    apixaban (ELIQUIS) 5 mg Tab Take 1 tablet by mouth 2 (two) times daily.      atorvastatin (LIPITOR) 80 MG tablet  "Take 1 tablet (80 mg total) by mouth once daily. 90 tablet 3    BD INSULIN SYRINGE ULTRA-FINE 0.5 mL 31 gauge x 5/16" Syrg USE WITH INSULIN 4 TIMES A  each 12    calcium acetate 667 mg Tab 1 capsule 3 TIMES DAILY (route: oral)      calcium acetate,phosphat bind, (PHOSLO) 667 mg capsule Take by mouth 3 (three) times daily.      calcium acetate,phosphat bind, (PHOSLO) 667 mg tablet Take 1 tablet (667 mg total) by mouth 3 (three) times daily with meals. 90 tablet 11    carvediloL (COREG) 6.25 MG tablet Take 1 tablet (6.25 mg total) by mouth 2 (two) times daily. 60 tablet 11    ciprofloxacin HCl (CIPRO) 500 MG tablet Take 1 tablet (500 mg total) by mouth once daily. Take this medication every 24 hours. On dialysis days, take this medication after dialysis. for 7 days 7 tablet 0    DULoxetine (CYMBALTA) 20 MG capsule Take 20 mg by mouth 2 (two) times daily.      erenumab-aooe (AIMOVIG AUTOINJECTOR) 140 mg/mL autoinjector Inject 1 mL (140 mg total) into the skin every 30 days. No further refills without appointment 1 mL 2    erenumab-aooe (AIMOVIG AUTOINJECTOR) 140 mg/mL autoinjector 140 mg MONTHLY (route: subcutaneous)      ergocalciferol (ERGOCALCIFEROL) 50,000 unit Cap TAKE ONE CAPSULE BY MOUTH ONE TIME PER WEEK, TAKES ON TUESDAYS 12 capsule 3    furosemide (LASIX) 40 MG tablet Take 1 tablet (40 mg total) by mouth once daily. 30 tablet 11    furosemide (LASIX) 40 MG tablet Take 1 tablet by mouth once daily.      GAVILYTE-C 240-22.72-6.72 -5.84 gram SolR Take 4,000 mLs by mouth once.      HYDROcodone-acetaminophen (NORCO)  mg per tablet Take 1 tablet by mouth every 6 (six) hours as needed for Pain. 120 tablet 0    insulin (LANTUS SOLOSTAR U-100 INSULIN) glargine 100 units/mL SubQ pen INJECT 14-15 UNITS UNDER THE SKIN ONCE DAILY (Patient taking differently: INJECT 14-16 UNITS UNDER THE SKIN ONCE DAILY) 15 each 3    isosorbide mononitrate (IMDUR) 30 MG 24 hr tablet Take 1 tablet (30 mg total) by mouth once " "daily. 30 tablet 11    isosorbide mononitrate (IMDUR) 30 MG 24 hr tablet Take 1 tablet by mouth once daily.      magnesium oxide (MAG-OX) 400 mg (241.3 mg magnesium) tablet Take 1 tablet (400 mg total) by mouth once daily. 30 tablet 2    magnesium oxide (MAGOX) 400 mg (241.3 mg magnesium) tablet Take 1 tablet by mouth once daily.      methocarbamoL (ROBAXIN) 750 MG Tab TAKE 1-2 TABLETS (750-1,500 MG TOTAL) BY MOUTH 3 (THREE) TIMES DAILY AS NEEDED (MUSCLE SPASMS). 180 tablet 1    NIFEdipine (ADALAT CC) 90 MG TbSR Take 1 tablet by mouth once daily.      NIFEdipine (PROCARDIA-XL) 90 MG (OSM) 24 hr tablet Take 1 tablet (90 mg total) by mouth once daily. 30 tablet 11    oxybutynin (DITROPAN-XL) 10 MG 24 hr tablet Take 1 tablet (10 mg total) by mouth once daily. 100 tablet 3    pen needle, diabetic (BD ULTRA-FINE SHORT PEN NEEDLE) 31 gauge x 5/16" Ndle USE WITH LANTUS DAILY, e 11.65 100 each 3    sodium bicarbonate 650 MG tablet Take 1 tablet (650 mg total) by mouth 3 (three) times daily. 90 tablet 11    SYNTHROID 50 mcg tablet TAKE 1 TABLET BY MOUTH BEFORE BREAKFAST. ADMINISTER ON AN EMPTY STOMACH AT LEAST 30 MINUTES BEFORE FOOD. IF RECEIVING TUBE FEEDS, HOLD TUBE FEEDS FOR 1 HOUR BEFORE AND AFTER LEVOTHYROXINE ADMINISTRATION. 90 tablet 2    traZODone (DESYREL) 100 MG tablet TAKE 1 TABLET BY MOUTH NIGHTLY AS NEEDED FOR INSOMNIA. 90 tablet 2    sumatriptan (IMITREX) 100 MG tablet Take 1 tablet (100 mg total) by mouth 2 (two) times daily as needed for Migraine. 30 tablet 1     Current Facility-Administered Medications on File Prior to Visit   Medication Dose Route Frequency Provider Last Rate Last Admin    [] heparin (porcine) injection 1,000 Units  1,000 Units Intravenous Continuous Emmanuel Hare Jr., MD   Stopped at 10/30/23 1329    [COMPLETED] iron sucrose injection 100 mg  100 mg Intravenous 1 time in Clinic/HOD Emmanuel Hare Jr., MD   100 mg at 10/30/23 1400       SOCIAL HISTORY:   Social History " "    Tobacco Use   Smoking Status Never   Smokeless Tobacco Never       Social History     Substance and Sexual Activity   Alcohol Use No    Alcohol/week: 0.0 standard drinks of alcohol       Social History     Substance and Sexual Activity   Drug Use No       ROS:   GENERAL: Denies fever, chills, weight loss/gain, fatigue  HEENT: Denies headaches, dizziness, vision/hearing changes  CARDIOVASCULAR: Denies chest pain, palpitations, or edema  RESPIRATORY: Denies dyspnea, cough  GI: Denies abdominal pain, rectal bleeding, nausea, vomiting. No change in bowel pattern or color  : + Tiwari catheter in place.  SKIN: Denies rash, itching   NEURO: Denies confusion, memory loss, or mood changes  PSYCH: Denies depression or anxiety  HEME/LYMPH: Denies easy bruising or bleeding    Objective Findings:    PHYSICAL EXAM:   Friendly White female, in no acute distress; alert and oriented to person, place and time.  VITALS: Ht 5' 8" (1.727 m)   Wt 104.3 kg (230 lb)   BMI 34.97 kg/m²   HENT: Normocephalic, without obvious abnormality.   EYES: Sclerae anicteric.   NECK: No obvious masses.  CARDIOVASCULAR: + Peripheral edema. R > L  RESPIRATORY: Normal respiratory effort.   GI: Soft obese abdomen.  EXTREMITIES:  No clubbing or cyanosis.  SKIN: Warm and dry. No jaundice. No rashes noted to exposed skin.   NEURO: No asterixis. In a Wheelchair.  PSYCH:  Memory intact. Thought and speech pattern appropriate. Behavior normal. No depression or anxiety noted.    DIAGNOSTIC STUDIES:    US ABDOMEN LIMITED 9/6/2021:    FINDINGS:    Liver: Normal in size, measuring 16.8 cm. Fatty replaced no focal hepatic lesions.     Gallbladder: Patient is status post cholecystectomy.     Biliary system: The common duct is not dilated, measuring 11 mm.  No intrahepatic ductal dilatation.     Spleen: Normal in size and echotexture, measuring 11.4 x 3.4 cm.     Miscellaneous: No upper abdominal ascites.     Impression:     Fatty replacement of the liver.   "   Postsurgical dilatation of the common bile duct.     No other remarkable findings are appreciated.    ABD. U/S - Ordered at visit.        ASSESSMENT & PLAN:  73 y.o. White female with:    1. Elevated liver enzymes  Ambulatory referral/consult to Hepatology    Hepatic Function Panel    US Abdomen Complete      2. Fatty liver disease, nonalcoholic        3. Class 1 obesity with serious comorbidity and body mass index (BMI) of 34.0 to 34.9 in adult, unspecified obesity type        4. Hyperlipidemia associated with type 2 diabetes mellitus        5. End stage renal disease        6. Dependence on hemodialysis          - Schedule Ultrasound of the liver now.   - Repeat liver function tests now.  - Continue with prophylactic vaccination series for Hepatitis B, as planned.  - Continue close follow up with Nephrology, along with dialysis appointments as scheduled.  - Recommend good control of cholesterol, blood pressure, & blood sugar levels.  - Avoid alcohol and herbal supplements/alternative remedies.  - Return to clinic to be determined by lab and ultrasound results.    Thank you for allowing me to participate in the care of Cecile Bowen       Hepatology Nurse Practitioner  [facility]    CC'ed note to:   Lurdes Navarro*  Lurdes Navarro MD

## 2023-11-01 ENCOUNTER — TELEPHONE (OUTPATIENT)
Dept: HEPATOLOGY | Facility: CLINIC | Age: 73
End: 2023-11-01
Payer: MEDICARE

## 2023-11-01 NOTE — TELEPHONE ENCOUNTER
----- Message from Maci Blue NP sent at 10/31/2023  1:48 PM CDT -----  Please contact patient and arrange repeat labs in 2 weeks. Orders in Epic. Thanks!

## 2023-11-02 NOTE — TELEPHONE ENCOUNTER
See message below.  This is the 3rd request.  If this should be sent to another provider, please let me know.  Thank you.

## 2023-11-02 NOTE — SUBJECTIVE & OBJECTIVE
Past Medical History:   Diagnosis Date    Cervicogenic migraine     Chronic pain     CKD (chronic kidney disease) stage 4, GFR 15-29 ml/min     Dr Piedadmoody    CKD (chronic kidney disease) stage 4, GFR 15-29 ml/min     Diabetes mellitus     Long term use of Insulin, Diabetic Neuropathy    Dialysis patient 1/21/2022    Fibromyalgia     Hydronephrosis     Hyperlipidemia     Hypertension 12/12/2012    Hypothyroidism 12/12/2012    ARON (iron deficiency anemia)     Insomnia     Levoscoliosis     Malignant neoplasm of upper-outer quadrant of left breast in female, estrogen receptor positive     Metabolic acidosis     Mobility impaired     Nuclear sclerosis - Both Eyes 3/24/2014    VIJAYA (obstructive sleep apnea)     Osteopenia     Pulmonary nodule     Recurrent UTI     Renal manifestation of secondary diabetes mellitus     Secondary hyperparathyroidism, renal     Urinary retention        Past Surgical History:   Procedure Laterality Date    BIOPSY OF URETER Left 2/18/2022    Procedure: BIOPSY, URETER;  Surgeon: Ronel Whittington MD;  [facility];  Service: Urology;  Laterality: Left;    BREAST BIOPSY Right     benign    CHOLECYSTECTOMY      COLONOSCOPY N/A 1/13/2017    Procedure: COLONOSCOPY;  Surgeon: Morris Wiseman MD;  Location: [facility] (4TH FLR);  Service: Endoscopy;  Laterality: N/A;  Renal pt Nephrology advised to avoid phosphate preps    CYSTOSCOPY N/A 10/8/2018    Procedure: CYSTOSCOPY;  Surgeon: Ronel Whittington MD;  [facility];  Service: Urology;  Laterality: N/A;  45 min    CYSTOSCOPY N/A 3/25/2019    Procedure: CYSTOSCOPY;  Surgeon: Ronel Whittington MD;  [facility];  Service: Urology;  Laterality: N/A;  45 min    CYSTOSCOPY N/A 8/26/2019    Procedure: CYSTOSCOPY;  Surgeon: Ronel Whittington MD;  [facility];  Service: Urology;  Laterality: N/A;  45 min    CYSTOSCOPY N/A 7/2/2021    Procedure: CYSTOSCOPY;  Surgeon: Ronel FRAZIER  Stable  -STOP metformin  -START Trulicity (dulaglutide), 0.75mg one injection once a week  Discussed risks of nausea, delayed gastric emptying, pancreatitis, gall stones and thyroid cancer. Pt denies any personal or FHx thyroid nodules/cancer, MEN syndrome, or thyroid disease, last TSH wnl 3/2023.   -Cont lisinopril and statin  -Pt interested in meeting with nutritionist, notes that he needs recommendations for simple ways to fit healthier food options into his routine and that he can be a picky eater.  Ref to nutrition  -Advised pt to f/u with eye doctor MD Pk;  [facility];  Service: Urology;  Laterality: N/A;    CYSTOSCOPY  12/23/2021    Procedure: CYSTOSCOPY;  Surgeon: Ronel Whittington MD;  [facility];  Service: Urology;;    CYSTOSCOPY  2/18/2022    Procedure: CYSTOSCOPY;  Surgeon: Ronel Whittington MD;  [facility];  Service: Urology;;    CYSTOSCOPY W/ URETERAL STENT PLACEMENT Left 5/15/2021    Procedure: CYSTOSCOPY, WITH URETERAL STENT INSERTION;  Surgeon: Levy Sánchez Jr., MD;  [facility];  Service: Urology;  Laterality: Left;    CYSTOSCOPY WITH BIOPSY OF BLADDER N/A 1/27/2020    Procedure: CYSTOSCOPY, WITH BLADDER BIOPSY, WITH FULGURATION IF INDICATED;  Surgeon: Ronel Whittington MD;  Location: [facility] [place] [address] [place] [address];  Service: Urology;  Laterality: N/A;    DILATION AND CURETTAGE OF UTERUS      GALLBLADDER SURGERY  2006    HYSTERECTOMY      INJECTION FOR SENTINEL NODE IDENTIFICATION Left 8/1/2019    Procedure: INJECTION, FOR SENTINEL NODE IDENTIFICATION;  Surgeon: Samia Fulton MD;  Location: [facility] OR [address] [place] [address];  Service: General;  Laterality: Left;    INJECTION OF BOTULINUM TOXIN TYPE A N/A 10/8/2018    Procedure: INJECTION, BOTULINUM TOXIN, TYPE A 300 UNITS;  Surgeon: Ronel Whittington MD;  Location: [facility] OR [address] [place] [address];  Service: Urology;  Laterality: N/A;    INJECTION OF BOTULINUM TOXIN TYPE A N/A 3/25/2019    Procedure: INJECTION, BOTULINUM TOXIN, TYPE A 300 UNITS;  Surgeon: Ronel Whittington MD;  Location: [facility] OR [address] [place] [address];  Service: Urology;  Laterality: N/A;    INJECTION OF BOTULINUM TOXIN TYPE A N/A 8/26/2019    Procedure: INJECTION, BOTULINUM TOXIN, TYPE A 300 UNITS;  Surgeon: Ronel Whittington MD;  [facility];  Service: Urology;  Laterality: N/A;    MASTECTOMY Left 8/1/2019    Procedure: MASTECTOMY 23 hour stay;  Surgeon: Samia Fulton MD;  Location: [facility] OR 2ND FLR;  Service: General;  Laterality: Left;    MEDIPORT REMOVAL Right 6/24/2022     "Procedure: REMOVAL, CATHETER, CENTRAL VENOUS, TUNNELED, WITH PORT;  Surgeon: Isauro Anderson MD;  Location: [facility] CATH LAB;  Service: Radiology;  Laterality: Right;    OVARIAN CYST SURGERY  1985    REPLACEMENT OF STENT Left 12/23/2021    Procedure: REPLACEMENT, STENT;  Surgeon: Ronel Whittington MD;  [facility];  Service: Urology;  Laterality: Left;    REPLACEMENT OF STENT Left 2/18/2022    Procedure: REPLACEMENT, STENT;  Surgeon: Ronel Whittington MD;  Location: [facility] OR 1ST FLR;  Service: Urology;  Laterality: Left;    RETROGRADE PYELOGRAPHY Left 2/18/2022    Procedure: PYELOGRAM, RETROGRADE;  Surgeon: Ronel Whittington MD;  [facility];  Service: Urology;  Laterality: Left;    SENTINEL LYMPH NODE BIOPSY Left 8/1/2019    Procedure: BIOPSY, LYMPH NODE, SENTINEL;  Surgeon: Samia Fulton MD;  [facility];  Service: General;  Laterality: Left;    spt placement      TONSILLECTOMY, ADENOIDECTOMY      TOTAL ABDOMINAL HYSTERECTOMY W/ BILATERAL SALPINGOOPHORECTOMY  1985    URETEROSCOPY Left 2/18/2022    Procedure: URETEROSCOPY;  Surgeon: Ronel Whittington MD;  [facility];  Service: Urology;  Laterality: Left;       Review of patient's allergies indicates:  No Known Allergies    No current facility-administered medications on file prior to encounter.     Current Outpatient Medications on File Prior to Encounter   Medication Sig    anastrozole (ARIMIDEX) 1 mg Tab Take 1 tablet (1 mg total) by mouth once daily.    apixaban (ELIQUIS) 5 mg Tab Take 1 tablet (5 mg total) by mouth 2 (two) times daily.    atorvastatin (LIPITOR) 80 MG tablet Take 1 tablet (80 mg total) by mouth once daily.    BD INSULIN SYRINGE ULTRA-FINE 0.5 mL 31 gauge x 5/16" Syrg USE WITH INSULIN 4 TIMES A DAY    calcium acetate,phosphat bind, (PHOSLO) 667 mg tablet Take 1 tablet (667 mg total) by mouth 3 (three) times daily with meals.    carvediloL (COREG) 6.25 MG tablet Take 1 " "tablet (6.25 mg total) by mouth 2 (two) times daily.    erenumab-aooe (AIMOVIG) 140 mg/mL autoinjector Inject 1 mL (140 mg total) into the skin every 30 days.    ergocalciferol (ERGOCALCIFEROL) 50,000 unit Cap TAKE ONE CAPSULE BY MOUTH ONE TIME PER WEEK, TAKES ON TUESDAYS    furosemide (LASIX) 40 MG tablet Take 1 tablet (40 mg total) by mouth once daily.    HYDROcodone-acetaminophen (NORCO)  mg per tablet Take 1 tablet by mouth every 6 (six) hours as needed for Pain.    insulin (LANTUS SOLOSTAR U-100 INSULIN) glargine 100 units/mL SubQ pen INJECT 14-15 UNITS UNDER THE SKIN ONCE DAILY    isosorbide mononitrate (IMDUR) 30 MG 24 hr tablet Take 1 tablet (30 mg total) by mouth once daily.    magnesium oxide (MAG-OX) 400 mg (241.3 mg magnesium) tablet Take 1 tablet (400 mg total) by mouth once daily.    methocarbamoL (ROBAXIN) 750 MG Tab TAKE 1-2 TABLETS (750-1,500 MG TOTAL) BY MOUTH 3 (THREE) TIMES DAILY AS NEEDED (MUSCLE SPASMS).    NIFEdipine (PROCARDIA-XL) 90 MG (OSM) 24 hr tablet Take 1 tablet (90 mg total) by mouth once daily.    ondansetron (ZOFRAN-ODT) 8 MG TbDL TAKE 1 TABLET BY MOUTH EVERY 12 HOURS AS NEEDED    oxybutynin (DITROPAN-XL) 10 MG 24 hr tablet Take 1 tablet (10 mg total) by mouth once daily.    pen needle, diabetic (BD ULTRA-FINE SHORT PEN NEEDLE) 31 gauge x 5/16" Ndle USE WITH LANTUS DAILY, e 11.65    sodium bicarbonate 650 MG tablet Take 1 tablet (650 mg total) by mouth 3 (three) times daily.    sumatriptan (IMITREX) 100 MG tablet Take 1 tablet (100 mg total) by mouth 2 (two) times daily as needed for Migraine.    SYNTHROID 50 mcg tablet TAKE 1 TABLET BY MOUTH BEFORE BREAKFAST. ADMINISTER ON AN EMPTY STOMACH AT LEAST 30 MINUTES BEFORE FOOD. IF RECEIVING TUBE FEEDS, HOLD TUBE FEEDS FOR 1 HOUR BEFORE AND AFTER LEVOTHYROXINE ADMINISTRATION.    traZODone (DESYREL) 100 MG tablet TAKE 1 TABLET BY MOUTH NIGHTLY AS NEEDED FOR INSOMNIA.     Family History       Problem Relation (Age of Onset)    ALS " Mother    Cancer Maternal Grandmother, Paternal Grandfather, Brother    Diabetes Sister, Maternal Aunt, Maternal Uncle    Kidney disease Sister, Maternal Aunt    Prostate cancer Brother    Scoliosis Brother          Tobacco Use    Smoking status: Never    Smokeless tobacco: Never   Substance and Sexual Activity    Alcohol use: No     Alcohol/week: 0.0 standard drinks of alcohol    Drug use: No    Sexual activity: Not Currently     Birth control/protection: Abstinence     Review of Systems   Constitutional:  Negative for appetite change, chills, diaphoresis, fatigue and fever.   HENT:  Negative for congestion and rhinorrhea.    Eyes:  Negative for photophobia and visual disturbance.   Respiratory:  Positive for cough (intermittent). Negative for shortness of breath and wheezing.    Cardiovascular:  Positive for leg swelling (x 5 months). Negative for chest pain and palpitations.   Gastrointestinal:  Positive for constipation (chronic). Negative for abdominal distention, abdominal pain, diarrhea, nausea and vomiting.   Genitourinary:  Positive for decreased urine volume (On HD). Negative for flank pain and hematuria.   Musculoskeletal:  Positive for back pain (chronic) and gait problem (chronic issue). Negative for neck pain.   Skin:  Negative for color change, pallor, rash and wound.   Neurological:  Positive for headaches (chronic, intermittent). Negative for dizziness, syncope, weakness and light-headedness.   Psychiatric/Behavioral:  Negative for confusion and hallucinations. The patient is not nervous/anxious.      Objective:     Vital Signs (Most Recent):  Temp: 98.4 °F (36.9 °C) (08/17/23 1410)  Pulse: 79 (08/17/23 1732)  Resp: 20 (08/17/23 1734)  BP: (!) 205/94 (08/17/23 1732)  SpO2: 100 % (08/17/23 1732) Vital Signs (24h Range):  Temp:  [98.4 °F (36.9 °C)] 98.4 °F (36.9 °C)  Pulse:  [66-98] 79  Resp:  [10-23] 20  SpO2:  [98 %-100 %] 100 %  BP: (149-219)/(65-97) 205/94        There is no height or weight on  file to calculate BMI.     Physical Exam  Vitals and nursing note reviewed.   Constitutional:       General: She is not in acute distress.     Appearance: She is obese. She is not toxic-appearing or diaphoretic.   HENT:      Head: Normocephalic and atraumatic.      Nose: Nose normal.      Mouth/Throat:      Mouth: Mucous membranes are moist.   Eyes:      Pupils: Pupils are equal, round, and reactive to light.   Cardiovascular:      Rate and Rhythm: Normal rate and regular rhythm.      Pulses: Normal pulses.      Heart sounds: No murmur heard.  Pulmonary:      Effort: Pulmonary effort is normal. No respiratory distress.      Breath sounds: No wheezing, rhonchi or rales.      Comments: Currently on room air  Chest:      Comments: R chest wall tunneled catheter; no surrounding erythema, drainage, or swelling  Abdominal:      General: Bowel sounds are normal. There is no distension.      Palpations: Abdomen is soft.      Tenderness: There is no abdominal tenderness. There is no guarding.      Comments: Suprapubic catheter in place draining hazy, sediment filled yellow urine   Musculoskeletal:         General: No tenderness or deformity. Normal range of motion.      Cervical back: Normal range of motion.      Right lower leg: Edema present.      Left lower leg: Edema present.   Skin:     General: Skin is warm and dry.      Capillary Refill: Capillary refill takes less than 2 seconds.      Comments: Dry flaky skin noted to BLE   Neurological:      General: No focal deficit present.      Mental Status: She is alert and oriented to person, place, and time.      Sensory: No sensory deficit.      Motor: Weakness (BLE) present.   Psychiatric:         Mood and Affect: Mood normal.         Behavior: Behavior normal.              CRANIAL NERVES     CN III, IV, VI   Pupils are equal, round, and reactive to light.       Significant Labs: All pertinent labs within the past 24 hours have been reviewed.  CBC:   Recent Labs   Lab  08/17/23  1528   WBC 9.92   HGB 8.7*   HCT 28.8*        CMP:   Recent Labs   Lab 08/17/23  1528   *   K 4.7   CL 96   CO2 22*   *   BUN 44*   CREATININE 3.0*   CALCIUM 8.3*   PROT 6.1   ALBUMIN 2.7*   BILITOT 0.3   ALKPHOS 120   AST 16   ALT 11   ANIONGAP 10     Cardiac Markers:   Recent Labs   Lab 08/17/23  1528   *     Lactic Acid:   Recent Labs   Lab 08/17/23  1528   LACTATE 0.6       Significant Imaging: I have reviewed all pertinent imaging results/findings within the past 24 hours.  Imaging Results              X-Ray Chest AP Portable (In process)

## 2023-11-02 NOTE — TELEPHONE ENCOUNTER
The message has been sent to Dr Cancino and we are wait for her responds .   We will contact you or patient once she has

## 2023-11-07 ENCOUNTER — DOCUMENT SCAN (OUTPATIENT)
Dept: HOME HEALTH SERVICES | Facility: HOSPITAL | Age: 73
End: 2023-11-07
Payer: MEDICARE

## 2023-11-07 ENCOUNTER — EXTERNAL HOME HEALTH (OUTPATIENT)
Dept: HOME HEALTH SERVICES | Facility: HOSPITAL | Age: 73
End: 2023-11-07
Payer: MEDICARE

## 2023-11-08 ENCOUNTER — HOSPITAL ENCOUNTER (OUTPATIENT)
Dept: RADIOLOGY | Facility: OTHER | Age: 73
Discharge: HOME | End: 2023-11-08
Attending: SURGERY
Payer: MEDICARE

## 2023-11-08 DIAGNOSIS — M79.89 PAIN AND SWELLING OF LOWER EXTREMITY, UNSPECIFIED LATERALITY: ICD-10-CM

## 2023-11-08 DIAGNOSIS — I80.03 PHLEBITIS AND THROMBOPHLEBITIS OF SUPERFICIAL VESSELS OF LOWER EXTREMITIES, BILATERAL: ICD-10-CM

## 2023-11-08 DIAGNOSIS — M79.606 PAIN AND SWELLING OF LOWER EXTREMITY, UNSPECIFIED LATERALITY: ICD-10-CM

## 2023-11-12 ENCOUNTER — DOCUMENT SCAN (OUTPATIENT)
Dept: HOME HEALTH SERVICES | Facility: HOSPITAL | Age: 73
End: 2023-11-12
Payer: MEDICARE

## 2023-11-14 ENCOUNTER — TELEPHONE (OUTPATIENT)
Dept: NEUROLOGY | Facility: CLINIC | Age: 73
End: 2023-11-14
Payer: MEDICARE

## 2023-11-14 ENCOUNTER — OFFICE VISIT (OUTPATIENT)
Dept: PHYSICAL MEDICINE AND REHAB | Facility: CLINIC | Age: 73
End: 2023-11-14
Payer: MEDICARE

## 2023-11-14 ENCOUNTER — LAB VISIT (OUTPATIENT)
Dept: LAB | Facility: HOSPITAL | Age: 73
End: 2023-11-14
Attending: NURSE PRACTITIONER
Payer: MEDICARE

## 2023-11-14 DIAGNOSIS — G56.03 BILATERAL CARPAL TUNNEL SYNDROME: ICD-10-CM

## 2023-11-14 DIAGNOSIS — E78.5 HYPERLIPIDEMIA ASSOCIATED WITH TYPE 2 DIABETES MELLITUS: ICD-10-CM

## 2023-11-14 DIAGNOSIS — N18.6 END STAGE RENAL DISEASE: ICD-10-CM

## 2023-11-14 DIAGNOSIS — M79.7 FIBROMYALGIA: ICD-10-CM

## 2023-11-14 DIAGNOSIS — G89.29 CHRONIC MIDLINE LOW BACK PAIN WITH RIGHT-SIDED SCIATICA: Primary | ICD-10-CM

## 2023-11-14 DIAGNOSIS — M47.26 OSTEOARTHRITIS OF SPINE WITH RADICULOPATHY, LUMBAR REGION: ICD-10-CM

## 2023-11-14 DIAGNOSIS — E66.01 SEVERE OBESITY (BMI 35.0-35.9 WITH COMORBIDITY): ICD-10-CM

## 2023-11-14 DIAGNOSIS — E11.69 HYPERLIPIDEMIA ASSOCIATED WITH TYPE 2 DIABETES MELLITUS: ICD-10-CM

## 2023-11-14 DIAGNOSIS — M94.0 COSTOCHONDRITIS: ICD-10-CM

## 2023-11-14 DIAGNOSIS — Z79.891 CHRONIC USE OF OPIATE FOR THERAPEUTIC PURPOSE: ICD-10-CM

## 2023-11-14 DIAGNOSIS — Z74.09 IMPAIRED MOBILITY AND ADLS: ICD-10-CM

## 2023-11-14 DIAGNOSIS — G89.29 CHRONIC NECK PAIN: ICD-10-CM

## 2023-11-14 DIAGNOSIS — E66.9 CLASS 1 OBESITY WITH SERIOUS COMORBIDITY AND BODY MASS INDEX (BMI) OF 34.0 TO 34.9 IN ADULT, UNSPECIFIED OBESITY TYPE: ICD-10-CM

## 2023-11-14 DIAGNOSIS — M54.41 CHRONIC MIDLINE LOW BACK PAIN WITH RIGHT-SIDED SCIATICA: Primary | ICD-10-CM

## 2023-11-14 DIAGNOSIS — M54.2 CHRONIC NECK PAIN: ICD-10-CM

## 2023-11-14 DIAGNOSIS — K76.0 FATTY LIVER DISEASE, NONALCOHOLIC: ICD-10-CM

## 2023-11-14 DIAGNOSIS — M75.101 RIGHT ROTATOR CUFF TEAR ARTHROPATHY: ICD-10-CM

## 2023-11-14 DIAGNOSIS — G89.29 CHRONIC MIDLINE THORACIC BACK PAIN: ICD-10-CM

## 2023-11-14 DIAGNOSIS — R79.89 ELEVATED LFTS: ICD-10-CM

## 2023-11-14 DIAGNOSIS — Z78.9 IMPAIRED MOBILITY AND ADLS: ICD-10-CM

## 2023-11-14 DIAGNOSIS — M12.811 RIGHT ROTATOR CUFF TEAR ARTHROPATHY: ICD-10-CM

## 2023-11-14 DIAGNOSIS — M54.6 CHRONIC MIDLINE THORACIC BACK PAIN: ICD-10-CM

## 2023-11-14 DIAGNOSIS — Z99.2 DEPENDENCE ON HEMODIALYSIS: ICD-10-CM

## 2023-11-14 DIAGNOSIS — M47.812 SPONDYLOSIS OF CERVICAL REGION WITHOUT MYELOPATHY OR RADICULOPATHY: ICD-10-CM

## 2023-11-14 LAB
A1AT SERPL-MCNC: 189 MG/DL (ref 100–190)
ALBUMIN SERPL BCP-MCNC: 3.2 G/DL (ref 3.5–5.2)
ALP SERPL-CCNC: 247 U/L (ref 55–135)
ALT SERPL W/O P-5'-P-CCNC: 29 U/L (ref 10–44)
AST SERPL-CCNC: 27 U/L (ref 10–40)
BILIRUB DIRECT SERPL-MCNC: 0.1 MG/DL (ref 0.1–0.3)
BILIRUB SERPL-MCNC: 0.3 MG/DL (ref 0.1–1)
CERULOPLASMIN SERPL-MCNC: 27 MG/DL (ref 15–45)
CK SERPL-CCNC: 56 U/L (ref 20–180)
IGG SERPL-MCNC: 1283 MG/DL (ref 650–1600)
PROT SERPL-MCNC: 7 G/DL (ref 6–8.4)

## 2023-11-14 PROCEDURE — 3044F PR MOST RECENT HEMOGLOBIN A1C LEVEL <7.0%: ICD-10-PCS | Mod: HCNC,CPTII,S$GLB, | Performed by: PHYSICAL MEDICINE & REHABILITATION

## 2023-11-14 PROCEDURE — 86015 ACTIN ANTIBODY EACH: CPT | Mod: HCNC | Performed by: NURSE PRACTITIONER

## 2023-11-14 PROCEDURE — 86694 HERPES SIMPLEX NES ANTBDY: CPT | Mod: HCNC | Performed by: NURSE PRACTITIONER

## 2023-11-14 PROCEDURE — 82784 ASSAY IGA/IGD/IGG/IGM EACH: CPT | Mod: HCNC | Performed by: NURSE PRACTITIONER

## 2023-11-14 PROCEDURE — 82103 ALPHA-1-ANTITRYPSIN TOTAL: CPT | Mod: HCNC | Performed by: NURSE PRACTITIONER

## 2023-11-14 PROCEDURE — 82550 ASSAY OF CK (CPK): CPT | Mod: HCNC | Performed by: NURSE PRACTITIONER

## 2023-11-14 PROCEDURE — 3066F PR DOCUMENTATION OF TREATMENT FOR NEPHROPATHY: ICD-10-PCS | Mod: HCNC,CPTII,S$GLB, | Performed by: PHYSICAL MEDICINE & REHABILITATION

## 2023-11-14 PROCEDURE — 3044F HG A1C LEVEL LT 7.0%: CPT | Mod: HCNC,CPTII,S$GLB, | Performed by: PHYSICAL MEDICINE & REHABILITATION

## 2023-11-14 PROCEDURE — 86645 CMV ANTIBODY IGM: CPT | Mod: HCNC | Performed by: NURSE PRACTITIONER

## 2023-11-14 PROCEDURE — 86225 DNA ANTIBODY NATIVE: CPT | Mod: HCNC | Performed by: NURSE PRACTITIONER

## 2023-11-14 PROCEDURE — 99215 PR OFFICE/OUTPT VISIT, EST, LEVL V, 40-54 MIN: ICD-10-PCS | Mod: HCNC,S$GLB,, | Performed by: PHYSICAL MEDICINE & REHABILITATION

## 2023-11-14 PROCEDURE — 86381 MITOCHONDRIAL ANTIBODY EACH: CPT | Mod: HCNC | Performed by: NURSE PRACTITIONER

## 2023-11-14 PROCEDURE — 80321 ALCOHOLS BIOMARKERS 1OR 2: CPT | Mod: HCNC | Performed by: NURSE PRACTITIONER

## 2023-11-14 PROCEDURE — 84080 ASSAY ALKALINE PHOSPHATASES: CPT | Mod: HCNC | Performed by: NURSE PRACTITIONER

## 2023-11-14 PROCEDURE — 36415 COLL VENOUS BLD VENIPUNCTURE: CPT | Mod: HCNC | Performed by: NURSE PRACTITIONER

## 2023-11-14 PROCEDURE — 87799 DETECT AGENT NOS DNA QUANT: CPT | Mod: HCNC | Performed by: NURSE PRACTITIONER

## 2023-11-14 PROCEDURE — 86235 NUCLEAR ANTIGEN ANTIBODY: CPT | Mod: 59,HCNC | Performed by: NURSE PRACTITIONER

## 2023-11-14 PROCEDURE — 82164 ANGIOTENSIN I ENZYME TEST: CPT | Mod: HCNC | Performed by: NURSE PRACTITIONER

## 2023-11-14 PROCEDURE — 86038 ANTINUCLEAR ANTIBODIES: CPT | Mod: HCNC | Performed by: NURSE PRACTITIONER

## 2023-11-14 PROCEDURE — 86039 ANTINUCLEAR ANTIBODIES (ANA): CPT | Mod: HCNC | Performed by: NURSE PRACTITIONER

## 2023-11-14 PROCEDURE — 3072F PR LOW RISK FOR RETINOPATHY: ICD-10-PCS | Mod: HCNC,CPTII,S$GLB, | Performed by: PHYSICAL MEDICINE & REHABILITATION

## 2023-11-14 PROCEDURE — 3072F LOW RISK FOR RETINOPATHY: CPT | Mod: HCNC,CPTII,S$GLB, | Performed by: PHYSICAL MEDICINE & REHABILITATION

## 2023-11-14 PROCEDURE — 99215 OFFICE O/P EST HI 40 MIN: CPT | Mod: HCNC,S$GLB,, | Performed by: PHYSICAL MEDICINE & REHABILITATION

## 2023-11-14 PROCEDURE — 80076 HEPATIC FUNCTION PANEL: CPT | Mod: HCNC | Performed by: NURSE PRACTITIONER

## 2023-11-14 PROCEDURE — 84075 ASSAY ALKALINE PHOSPHATASE: CPT | Mod: 91,HCNC | Performed by: NURSE PRACTITIONER

## 2023-11-14 PROCEDURE — 82390 ASSAY OF CERULOPLASMIN: CPT | Mod: HCNC | Performed by: NURSE PRACTITIONER

## 2023-11-14 PROCEDURE — 3066F NEPHROPATHY DOC TX: CPT | Mod: HCNC,CPTII,S$GLB, | Performed by: PHYSICAL MEDICINE & REHABILITATION

## 2023-11-14 NOTE — TELEPHONE ENCOUNTER
Pt was seen by provider.     ----- Message from Nemo Schwartz sent at 11/14/2023  8:52 AM CST -----  Regarding: Unable to reach transportation, has not arrived yet. Very close to the campus 6 blocks away.  Contact: @[phone]  Regarding:Unable to reach transportation, has not arrived yet. Very close to the campus 6 blocks away.    Contact: Cecile    Type: Will call back to update on any lateness.    Who Called: Cecile    Would the patient rather a call back or a response via [facility]? Phone call    Best Call Back Number: [phone]

## 2023-11-14 NOTE — PROGRESS NOTES
Subjective:       Patient ID: Cecile Bowen is a 73 y.o. female.      Chief Complaint: Follow-up    HPI     HISTORY OF PRESENT ILLNESS:  Ms. Bowen is a 73-year-old white female with past medical history of HTN, DM, ESRD on HD (since 8/2023), chronic anticoagulation with Eliquis (since 8/2023), hypothyroidism, fibromyalgia, status post left mastectomy in 08/2019, severe obesity and OA.  She also has history of hospitalization for blockage of the urethra with hydronephrosis and multiple medical complications in 06/2021, followed by intensive rehabilitation program but persistent impairment of her activities of daily living and mobility. She is followed up in  the [facility] for chronic low back pain due to DJD, status post multiple procedures including RFAs and ESIs, chronic thoracic pain, chronic neck pain, fibromyalgia and bilateral carpal tunnel syndrome.  Her last visit was on 01/05/2023 01/05/2023. She was maintained on duloxetine, p.r.n. Hydrocodone/APAP, p.r.n. Methocarbamol and diclofenac gel.  A prescription for a Ray lift was generated. A Pain Clinic Drug Screen was obtained and was positive as expected for hydrocodone.    The patient was hospitalized from 08/11/2023 to 08/14/2023 for acute on chronic renal failure.  She was placed on hemodialysis.  She was discharged home and received home health therapy.    The patient is coming to the clinic for follow-up.  Her fibromyalgia symptoms including generalized muscle and joint aching, stiffness, and fatigue have been worse.    Her back pain has been stable with occasional flare ups.  It is a constant throbbing, stabbing and stiffness in the lumbar spine and across her back.  She has occasional radiation to to the right foot. It is worse with activity. Her maximum pain is 9-10/10 and minimum 7/10.  Today, it is 8/10.  Her bilateral lower extremity weakness has been stable.  She has adult diapers.  She is status post suprapubic catheter due to  neurogenic bladder.    Her neck pain has been worse.  It is an almost constant aching pain in the cervical spine.  She denies any radiation to her arms.  It is worse with neck movement, especially rotation. Her maximum pain is 8/10 and minimum 5/10.  Today, it is 6/10.  The patient denies any upper extremity weakness.  She has occasional hand numbness.    Her triggering of the right thumb has been better.    The patient continues to require assistance for her activities of daily living including toileting and bathing.  She uses a manual Ray lift for transfers.  Her insurance did Sdid not cover an electric Ray lift.  She has a suprapubic catheter.  She wears adult diapers.      She is currently taking:  - duloxetine 20 mg capsules, 1 capsule twice daily  - hydrocodone/APAP 10/325 p.r.n. four times per day  - methocarbamol 750 mg p.o. p.r.n., usually 2 tablets 3 times per day  - Voltaren gel topically p.r.n. for painful joints.      Past Medical History:   Diagnosis Date    Cervicogenic migraine     Chronic pain     CKD (chronic kidney disease) stage 4, GFR 15-29 ml/min     [facility]    CKD (chronic kidney disease) stage 4, GFR 15-29 ml/min     Diabetes mellitus     Long term use of Insulin, Diabetic Neuropathy    Dialysis patient 1/21/2022    Fibromyalgia     Hydronephrosis     Hyperlipidemia     Hypertension 12/12/2012    Hypothyroidism 12/12/2012    ARON (iron deficiency anemia)     Insomnia     Levoscoliosis     Malignant neoplasm of upper-outer quadrant of left breast in female, estrogen receptor positive     Metabolic acidosis     Mobility impaired     Nuclear sclerosis - Both Eyes 3/24/2014    VIJAYA (obstructive sleep apnea)     Osteopenia     Pulmonary nodule     Recurrent UTI     Renal manifestation of secondary diabetes mellitus     Secondary hyperparathyroidism, renal     Urinary retention          Review of Systems   Constitutional:  Positive for chills and fatigue. Negative for fever.   Eyes:   Negative for visual disturbance.   Respiratory:  Negative for shortness of breath.    Cardiovascular:  Positive for chest pain (sternum).   Gastrointestinal:  Positive for nausea. Negative for constipation and vomiting.   Genitourinary:         S/p suprapubic catheter   Musculoskeletal:  Positive for arthralgias, back pain, gait problem and neck pain. Negative for joint swelling.   Neurological:  Positive for weakness and headaches. Negative for dizziness.   Psychiatric/Behavioral:  Positive for sleep disturbance. Negative for behavioral problems.        Objective:      Physical Exam  Vitals reviewed.   Constitutional:       Appearance: She is well-developed.      Comments: Coming to the clinic in a manual wheelchair   HENT:      Head: Normocephalic and atraumatic.   Eyes:      Extraocular Movements: Extraocular movements intact.   Musculoskeletal:      Cervical back: No tenderness.      Comments: BUE:  ROM:   RUE: full.   LUE: full.  Strength:    RUE: 3-/5 at shoulder abduction, 5- elbow flexion, 5- elbow extension, 5- hand .   LUE: 4/5 at shoulder abduction, 5- elbow flexion, 5- elbow extension, 5- hand .  Sensation to pinprick:   RUE: intact.   LUE: intact.  DTR:    RUE: +1 biceps, +1 triceps.   LUE:  +1 biceps, +1 triceps.    BLE:  ROM:   RLE: increased tone.   LLE: increased tone.   BLE edema, R>L.  Strength:    RLE: 4-/5 at hip flexion, 4 knee extension, 4 ankle DF, 4 PF.   LLE: 4-/5 at hip flexion, 4 knee extension, 4 ankle DF, 4 PF.  Sensation to pinprick:     RLE: intact.      LLE: intact.   DTR:     RLE: +1 knee, +1 ankle.    LLE: +1 knee, +1 ankle.  SLR (sitting):      RLE: -ve.      LLE: -ve.       Skin:     General: Skin is warm.   Neurological:      General: No focal deficit present.      Mental Status: She is alert.   Psychiatric:         Behavior: Behavior normal.           Assessment:       1. Chronic midline low back pain with right-sided sciatica    2. Osteoarthritis of spine with  radiculopathy, lumbar region    3. Chronic neck pain    4. Spondylosis of cervical region without myelopathy or radiculopathy    5. Chronic midline thoracic back pain    6. Fibromyalgia    7. Costochondritis    8. Bilateral carpal tunnel syndrome    9. Right rotator cuff tear arthropathy    10. Impaired mobility and ADLs    11. Severe obesity (BMI 35.0-35.9 with comorbidity)    12. Chronic use of opiate for therapeutic purpose        Plan:       - NSAIDs will be avoided due to CKD.  - Continue DULoxetine 20 mg CpDR; Take 40 mg by mouth twice daily.  - Co[place]ue HYDROcodone-acetaminophen (NORCO)  mg per tablet; Take 1 tablet by mouth every 6 (six) hours as needed for Pain.  - Continue methocarbamol (ROBAXIN) 750 MG Tab; Take 1-2 tablets (750-1,500 mg total) by mouth 3 (three) times daily as needed.  - Continue diclofenac sodium (VOLTAREN) 1 % Gel; Apply 2 g topically 3-4 times daily p.r.n. (to costochondral areas and right shoulder).  - Her radiculopathy is mild.  I will hold off on adding gabapentin to her medications at this time.  - Regular home exercise program was encouraged.  - A prescription for a soft cervical collar to use when her neck pain flares up was given to her.  - Follow up in about 4 months (around 3/14/2024).         This was a 40 minute visit (including review of records), 50% of which was spent educating the patient about the diagnosis and the treatment plan.      This note was partly generated with [employer] voice recognition software. I apologize for any possible typographical errors.

## 2023-11-15 LAB
ACE SERPL-CCNC: 50 U/L (ref 16–85)
ANA PATTERN 1: NORMAL
ANA SER QL IF: POSITIVE
ANA TITR SER IF: NORMAL {TITER}
EPSTEIN-BARR VIRUS DNA: NORMAL
EPSTEIN-BARR VIRUS PCR, QUANT: NOT DETECTED IU/ML
MITOCHONDRIA AB TITR SER IF: NORMAL {TITER}
SMOOTH MUSCLE AB TITR SER IF: NORMAL {TITER}

## 2023-11-16 LAB
ANTI SM ANTIBODY: 0.35 RATIO (ref 0–0.99)
ANTI SM/RNP ANTIBODY: 0.68 RATIO (ref 0–0.99)
ANTI-SM INTERPRETATION: NEGATIVE
ANTI-SM/RNP INTERPRETATION: NEGATIVE
ANTI-SSA ANTIBODY: 0.1 RATIO (ref 0–0.99)
ANTI-SSA INTERPRETATION: NEGATIVE
ANTI-SSB ANTIBODY: 0.06 RATIO (ref 0–0.99)
ANTI-SSB INTERPRETATION: NEGATIVE
DSDNA AB SER-ACNC: NORMAL [IU]/ML

## 2023-11-17 ENCOUNTER — DOCUMENT SCAN (OUTPATIENT)
Dept: HOME HEALTH SERVICES | Facility: HOSPITAL | Age: 73
End: 2023-11-17
Payer: MEDICARE

## 2023-11-17 LAB
CLINICAL BIOCHEMIST REVIEW: NORMAL
CMV IGM SERPL IA-ACNC: 9.7 AU/ML
PLPETH BLD-MCNC: <10 NG/ML
POPETH BLD-MCNC: <10 NG/ML

## 2023-11-20 ENCOUNTER — TELEPHONE (OUTPATIENT)
Dept: HEPATOLOGY | Facility: CLINIC | Age: 73
End: 2023-11-20
Payer: MEDICARE

## 2023-11-20 DIAGNOSIS — R89.4 POSITIVE TEST FOR HERPES SIMPLEX VIRUS (HSV) ANTIBODY: Primary | ICD-10-CM

## 2023-11-20 DIAGNOSIS — N18.6 END STAGE RENAL DISEASE: ICD-10-CM

## 2023-11-20 DIAGNOSIS — Z99.2 DEPENDENCE ON HEMODIALYSIS: ICD-10-CM

## 2023-11-20 DIAGNOSIS — R79.89 ELEVATED LFTS: ICD-10-CM

## 2023-11-20 LAB — HSV AB, IGM BY EIA: 1.21 INDEX

## 2023-11-20 NOTE — TELEPHONE ENCOUNTER
Spoke with Mrs. Huber and due to transportation concerns she is doing labs on 11/28----- Message from Maci Blue NP sent at 11/20/2023 10:06 AM CST -----  Please contact patient to schedule a lab appt for the blood test- Herpes simplex Virus (HSV) Type 1 & 2 DNA by PCR. Order in Epic. Thank you.

## 2023-11-28 ENCOUNTER — TELEPHONE (OUTPATIENT)
Dept: INTERNAL MEDICINE | Facility: CLINIC | Age: 73
End: 2023-11-28

## 2023-11-28 LAB
ALP BONE CFR SERPL: 30 % (ref 28–66)
ALP INTEST CFR SERPL: 0 % (ref 1–24)
ALP ISOS SERPL-IMP: ABNORMAL
ALP LIVER CFR SERPL: 70 % (ref 25–69)
ALP MACROHEPATIC CFR SERPL: ABNORMAL %
ALP PLAC CFR SERPL: 0 %
ALP SERPL-CCNC: 234 U/L (ref 37–153)

## 2023-11-28 NOTE — TELEPHONE ENCOUNTER
----- Message from Ema Tony sent at 11/28/2023 11:54 AM CST -----  Contact: [phone]  [email or web address]     Patient is calling for Medical Advice regarding: Patient is having trouble with the Eliquis: bruise and bum around the body. Patient is asking If she has to stop taking it.     How long has patient had these symptoms: over a month     Pharmacy name and phone#:   [facility]/pharmacy #[id number] - [place] - [address]  [address]  [place] [address]  Phone: [phone] Fax: [phone]    Would like response via [employer]:  phone call     Comments:

## 2023-11-30 ENCOUNTER — TELEPHONE (OUTPATIENT)
Dept: HEPATOLOGY | Facility: CLINIC | Age: 73
End: 2023-11-30
Payer: MEDICARE

## 2023-11-30 ENCOUNTER — PATIENT MESSAGE (OUTPATIENT)
Dept: PHYSICAL MEDICINE AND REHAB | Facility: CLINIC | Age: 73
End: 2023-11-30
Payer: MEDICARE

## 2023-11-30 DIAGNOSIS — G89.29 CHRONIC NECK PAIN: ICD-10-CM

## 2023-11-30 DIAGNOSIS — G89.29 CHRONIC MIDLINE LOW BACK PAIN WITHOUT SCIATICA: ICD-10-CM

## 2023-11-30 DIAGNOSIS — M54.50 CHRONIC MIDLINE LOW BACK PAIN WITHOUT SCIATICA: ICD-10-CM

## 2023-11-30 DIAGNOSIS — N32.89 BLADDER SPASMS: ICD-10-CM

## 2023-11-30 DIAGNOSIS — M54.2 CHRONIC NECK PAIN: ICD-10-CM

## 2023-11-30 DIAGNOSIS — M79.7 FIBROMYALGIA: ICD-10-CM

## 2023-11-30 NOTE — TELEPHONE ENCOUNTER
----- Message from Maci Blue NP sent at 11/30/2023  9:37 AM CST -----  Please contact patient and let her know that I rescheduled her appt from February to a sooner visit on 1/2/2024 at 3:30 pm. Thank you.

## 2023-12-01 RX ORDER — OXYBUTYNIN CHLORIDE 10 MG/1
10 TABLET, EXTENDED RELEASE ORAL
Qty: 90 TABLET | Refills: 4 | Status: SHIPPED | OUTPATIENT
Start: 2023-12-01

## 2023-12-01 RX ORDER — METHOCARBAMOL 750 MG/1
TABLET, FILM COATED ORAL
Qty: 180 TABLET | Refills: 1 | Status: SHIPPED | OUTPATIENT
Start: 2023-12-01 | End: 2024-01-31

## 2023-12-04 ENCOUNTER — TELEPHONE (OUTPATIENT)
Dept: NEUROLOGY | Facility: CLINIC | Age: 73
End: 2023-12-04
Payer: MEDICARE

## 2023-12-04 RX ORDER — HYDROCODONE BITARTRATE AND ACETAMINOPHEN 10; 325 MG/1; MG/1
1 TABLET ORAL EVERY 6 HOURS PRN
Qty: 120 TABLET | Refills: 0 | Status: SHIPPED | OUTPATIENT
Start: 2023-12-04 | End: 2023-12-15 | Stop reason: SDUPTHER

## 2023-12-04 NOTE — TELEPHONE ENCOUNTER
----- Message from Cassi Shah sent at 12/4/2023  4:21 PM CST -----  Regarding: refill  Contact: [phone]  Pt requesting refill of medication listed. Pt stated she is down to a few days. Pls call to discuss. HYDROcodone-acetaminophen (NORCO)  mg per tablet    ..  [facility]/pharmacy #[id number] - [place] - [address]  [address]  [place] [address]  Phone: [phone] Fax: [phone]

## 2023-12-05 ENCOUNTER — OFFICE VISIT (OUTPATIENT)
Dept: UROLOGY | Facility: CLINIC | Age: 73
End: 2023-12-05
Payer: MEDICARE

## 2023-12-05 VITALS
HEIGHT: 68 IN | BODY MASS INDEX: 34.86 KG/M2 | DIASTOLIC BLOOD PRESSURE: 70 MMHG | WEIGHT: 230 LBS | HEART RATE: 71 BPM | SYSTOLIC BLOOD PRESSURE: 130 MMHG

## 2023-12-05 DIAGNOSIS — N31.9 NEUROGENIC BLADDER: Primary | ICD-10-CM

## 2023-12-05 DIAGNOSIS — Z74.09 IMMOBILITY: ICD-10-CM

## 2023-12-05 DIAGNOSIS — R33.9 URINARY RETENTION: ICD-10-CM

## 2023-12-05 DIAGNOSIS — Z93.59 CHRONIC SUPRAPUBIC CATHETER: ICD-10-CM

## 2023-12-05 PROCEDURE — 1101F PT FALLS ASSESS-DOCD LE1/YR: CPT | Mod: HCNC,CPTII,S$GLB,

## 2023-12-05 PROCEDURE — 3008F BODY MASS INDEX DOCD: CPT | Mod: HCNC,CPTII,S$GLB,

## 2023-12-05 PROCEDURE — 3066F NEPHROPATHY DOC TX: CPT | Mod: HCNC,CPTII,S$GLB,

## 2023-12-05 PROCEDURE — 3072F LOW RISK FOR RETINOPATHY: CPT | Mod: HCNC,CPTII,S$GLB,

## 2023-12-05 PROCEDURE — 3072F PR LOW RISK FOR RETINOPATHY: ICD-10-PCS | Mod: HCNC,CPTII,S$GLB,

## 2023-12-05 PROCEDURE — 3078F PR MOST RECENT DIASTOLIC BLOOD PRESSURE < 80 MM HG: ICD-10-PCS | Mod: HCNC,CPTII,S$GLB,

## 2023-12-05 PROCEDURE — 99999 PR PBB SHADOW E&M-EST. PATIENT-LVL IV: ICD-10-PCS | Mod: PBBFAC,HCNC,,

## 2023-12-05 PROCEDURE — 3288F PR FALLS RISK ASSESSMENT DOCUMENTED: ICD-10-PCS | Mod: HCNC,CPTII,S$GLB,

## 2023-12-05 PROCEDURE — 3075F SYST BP GE 130 - 139MM HG: CPT | Mod: HCNC,CPTII,S$GLB,

## 2023-12-05 PROCEDURE — 3075F PR MOST RECENT SYSTOLIC BLOOD PRESS GE 130-139MM HG: ICD-10-PCS | Mod: HCNC,CPTII,S$GLB,

## 2023-12-05 PROCEDURE — 1125F AMNT PAIN NOTED PAIN PRSNT: CPT | Mod: HCNC,CPTII,S$GLB,

## 2023-12-05 PROCEDURE — 1160F RVW MEDS BY RX/DR IN RCRD: CPT | Mod: HCNC,CPTII,S$GLB,

## 2023-12-05 PROCEDURE — 99499 UNLISTED E&M SERVICE: CPT | Mod: HCNC,S$GLB,,

## 2023-12-05 PROCEDURE — 1159F PR MEDICATION LIST DOCUMENTED IN MEDICAL RECORD: ICD-10-PCS | Mod: HCNC,CPTII,S$GLB,

## 2023-12-05 PROCEDURE — 99999 PR PBB SHADOW E&M-EST. PATIENT-LVL IV: CPT | Mod: PBBFAC,HCNC,,

## 2023-12-05 PROCEDURE — 3008F PR BODY MASS INDEX (BMI) DOCUMENTED: ICD-10-PCS | Mod: HCNC,CPTII,S$GLB,

## 2023-12-05 PROCEDURE — 51705 PR CHANGE OF BLADDER TUBE,SIMPLE: ICD-10-PCS | Mod: HCNC,S$GLB,,

## 2023-12-05 PROCEDURE — 51705 CHANGE OF BLADDER TUBE: CPT | Mod: HCNC,S$GLB,,

## 2023-12-05 PROCEDURE — 1160F PR REVIEW ALL MEDS BY PRESCRIBER/CLIN PHARMACIST DOCUMENTED: ICD-10-PCS | Mod: HCNC,CPTII,S$GLB,

## 2023-12-05 PROCEDURE — 1125F PR PAIN SEVERITY QUANTIFIED, PAIN PRESENT: ICD-10-PCS | Mod: HCNC,CPTII,S$GLB,

## 2023-12-05 PROCEDURE — 3288F FALL RISK ASSESSMENT DOCD: CPT | Mod: HCNC,CPTII,S$GLB,

## 2023-12-05 PROCEDURE — 1101F PR PT FALLS ASSESS DOC 0-1 FALLS W/OUT INJ PAST YR: ICD-10-PCS | Mod: HCNC,CPTII,S$GLB,

## 2023-12-05 PROCEDURE — 3044F HG A1C LEVEL LT 7.0%: CPT | Mod: HCNC,CPTII,S$GLB,

## 2023-12-05 PROCEDURE — 3044F PR MOST RECENT HEMOGLOBIN A1C LEVEL <7.0%: ICD-10-PCS | Mod: HCNC,CPTII,S$GLB,

## 2023-12-05 PROCEDURE — 3066F PR DOCUMENTATION OF TREATMENT FOR NEPHROPATHY: ICD-10-PCS | Mod: HCNC,CPTII,S$GLB,

## 2023-12-05 PROCEDURE — 3078F DIAST BP <80 MM HG: CPT | Mod: HCNC,CPTII,S$GLB,

## 2023-12-05 PROCEDURE — 1159F MED LIST DOCD IN RCRD: CPT | Mod: HCNC,CPTII,S$GLB,

## 2023-12-05 PROCEDURE — 99499 NO LOS: ICD-10-PCS | Mod: HCNC,S$GLB,,

## 2023-12-05 NOTE — PROGRESS NOTES
CHIEF COMPLAINT:  SPT change    HISTORY OF PRESENTING ILLINESS:  Cecile Bowen is a 73 y.o. female established with urology. She presents today for monthly SPT change. She is a diabetic female with history of bilateral hydronephrosis, incomplete bladder emptying which is managed by SPT (16fr).    Last SPT change was 10/26/2023.    She has been having SPT changes with [facility], insurance has stopped covering this service. She has an apt scheduled for monthly SPT changes next week with JIMMY Ken NP. She has had SPT in place for about 8 years. States she does not get recurrent UTIs.     S/p Cystoscopy with bladder botox injection (300u) 2018 with Dr. Whittington.     2021 found to have a left proximal ureteral stone, ZAK, and UTI; stent was place.  2021 cysto and left stent exchange; unable to have laser litho due to bacteremia.  2021 cysto with Left stent exchange.  2022 Left URS, cysto, stent exchange (now with strings). Ureteral bx; SPT exchange.     She is here today for SPT exchange.   [facility] had been seeing her but HH orders have .  Would like for them to continue the service  She is unable to walk, drive, or use her motorized scooter.  Due to her condition at high risk for infections and falls.     She has been diagnosed with breast cancer; s/p Mastectomy 2019  Malignant neoplasm of upper-outer quadrant of left breast in female, estrogen receptor positive   No need for chemo/radiation therapy.          REVIEW OF SYSTEMS:  Review of Systems   Constitutional:  Negative for chills and fever.   HENT:  Negative for congestion and sore throat.    Respiratory:  Negative for cough and shortness of breath.    Cardiovascular:  Negative for chest pain and palpitations.   Gastrointestinal:  Negative for nausea and vomiting.   Genitourinary:  Negative for flank pain and hematuria.        SPT in place    Neurological:  Negative for dizziness and headaches.          PATIENT HISTORY:    Past Medical History:   Diagnosis Date    Cervicogenic migraine     Chronic pain     CKD (chronic kidney disease) stage 4, GFR 15-29 ml/min     Dr Piedadmoody    CKD (chronic kidney disease) stage 4, GFR 15-29 ml/min     Diabetes mellitus     Long term use of Insulin, Diabetic Neuropathy    Dialysis patient 1/21/2022    Fibromyalgia     Hydronephrosis     Hyperlipidemia     Hypertension 12/12/2012    Hypothyroidism 12/12/2012    ARON (iron deficiency anemia)     Insomnia     Levoscoliosis     Malignant neoplasm of upper-outer quadrant of left breast in female, estrogen receptor positive     Metabolic acidosis     Mobility impaired     Nuclear sclerosis - Both Eyes 3/24/2014    VIJAYA (obstructive sleep apnea)     Osteopenia     Pulmonary nodule     Recurrent UTI     Renal manifestation of secondary diabetes mellitus     Secondary hyperparathyroidism, renal     Urinary retention        Past Surgical History:   Procedure Laterality Date    BIOPSY OF URETER Left 2/18/2022    Procedure: BIOPSY, URETER;  Surgeon: Ronel Whittington MD;  [facility];  Service: Urology;  Laterality: Left;    BREAST BIOPSY Right     benign    CHOLECYSTECTOMY      COLONOSCOPY N/A 1/13/2017    Procedure: COLONOSCOPY;  Surgeon: Morris Wiseman MD;  Location: [facility] (4TH FLR);  Service: Endoscopy;  Laterality: N/A;  Renal pt Nephrology advised to avoid phosphate preps    CYSTOSCOPY N/A 10/8/2018    Procedure: CYSTOSCOPY;  Surgeon: Ronel Whittington MD;  [facility];  Service: Urology;  Laterality: N/A;  45 min    CYSTOSCOPY N/A 3/25/2019    Procedure: CYSTOSCOPY;  Surgeon: Ronel Whittington MD;  Location: [facility] OR [address] [place] [address];  Service: Urology;  Laterality: N/A;  45 min    CYSTOSCOPY N/A 8/26/2019    Procedure: CYSTOSCOPY;  Surgeon: Ronel Whittington MD;  [facility];  Service: Urology;  Laterality: N/A;  45 min    CYSTOSCOPY N/A 7/2/2021    Procedure:  CYSTOSCOPY;  Surgeon: Ronel Whittington MD;  Location: [facility] OR 1ST FLR;  Service: Urology;  Laterality: N/A;    CYSTOSCOPY  12/23/2021    Procedure: CYSTOSCOPY;  Surgeon: Ronel Whittington MD;  Location: [facility] OR 1ST FLR;  Service: Urology;;    CYSTOSCOPY  2/18/2022    Procedure: CYSTOSCOPY;  Surgeon: Ronel Whittington MD;  [facility];  Service: Urology;;    CYSTOSCOPY W/ URETERAL STENT PLACEMENT Left 5/15/2021    Procedure: CYSTOSCOPY, WITH URETERAL STENT INSERTION;  Surgeon: Levy Sánchez Jr., MD;  [facility];  Service: Urology;  Laterality: Left;    CYSTOSCOPY WITH BIOPSY OF BLADDER N/A 1/27/2020    Procedure: CYSTOSCOPY, WITH BLADDER BIOPSY, WITH FULGURATION IF INDICATED;  Surgeon: Ronel Whittington MD;  Location: [facility] OR [address] [place] [address];  Service: Urology;  Laterality: N/A;    DILATION AND CURETTAGE OF UTERUS      GALLBLADDER SURGERY  2006    HYSTERECTOMY      INJECTION FOR SENTINEL NODE IDENTIFICATION Left 8/1/2019    Procedure: INJECTION, FOR SENTINEL NODE IDENTIFICATION;  Surgeon: Samia Fulton MD;  Location: [facility] OR 2ND FLR;  Service: General;  Laterality: Left;    INJECTION OF BOTULINUM TOXIN TYPE A N/A 10/8/2018    Procedure: INJECTION, BOTULINUM TOXIN, TYPE A 300 UNITS;  Surgeon: Ronel Whittington MD;  [facility];  Service: Urology;  Laterality: N/A;    INJECTION OF BOTULINUM TOXIN TYPE A N/A 3/25/2019    Procedure: INJECTION, BOTULINUM TOXIN, TYPE A 300 UNITS;  Surgeon: Ronel Whittington MD;  [facility];  Service: Urology;  Laterality: N/A;    INJECTION OF BOTULINUM TOXIN TYPE A N/A 8/26/2019    Procedure: INJECTION, BOTULINUM TOXIN, TYPE A 300 UNITS;  Surgeon: Ronel Whittington MD;  [facility];  Service: Urology;  Laterality: N/A;    MASTECTOMY Left 8/1/2019    Procedure: MASTECTOMY 23 hour stay;  Surgeon: Samia Fulton MD;  Location: [facility] OR 2ND FLR;  Service: General;  Laterality: Left;     MEDIPORT REMOVAL Right 6/24/2022    Procedure: REMOVAL, CATHETER, CENTRAL VENOUS, TUNNELED, WITH PORT;  Surgeon: Isauro Anderson MD;  Location: [facility] CATH LAB;  Service: Radiology;  Laterality: Right;    OVARIAN CYST SURGERY  1985    REPLACEMENT OF STENT Left 12/23/2021    Procedure: REPLACEMENT, STENT;  Surgeon: Ronel Whittington MD;  Location: [facility] OR [facility] FLR;  Service: Urology;  Laterality: Left;    REPLACEMENT OF STENT Left 2/18/2022    Procedure: REPLACEMENT, STENT;  Surgeon: Ronel Whittington MD;  [facility];  Service: Urology;  Laterality: Left;    RETROGRADE PYELOGRAPHY Left 2/18/2022    Procedure: PYELOGRAM, RETROGRADE;  Surgeon: Ronel Whittington MD;  [facility];  Service: Urology;  Laterality: Left;    SENTINEL LYMPH NODE BIOPSY Left 8/1/2019    Procedure: BIOPSY, LYMPH NODE, SENTINEL;  Surgeon: Samia Fulton MD;  [facility];  Service: General;  Laterality: Left;    spt placement      TONSILLECTOMY, ADENOIDECTOMY      TOTAL ABDOMINAL HYSTERECTOMY W/ BILATERAL SALPINGOOPHORECTOMY  1985    URETEROSCOPY Left 2/18/2022    Procedure: URETEROSCOPY;  Surgeon: Ronel Whittington MD;  [facility];  Service: Urology;  Laterality: Left;       Family History   Problem Relation Age of Onset    Diabetes Sister     Kidney disease Sister         CKD III    ALS Mother         d.    Cancer Maternal Grandmother         d. colon    Cancer Paternal Grandfather         d. lung    Scoliosis Brother         increased pain    Prostate cancer Brother         cured s/p surgery    Cancer Brother         rare cancer that got into the bones    Diabetes Maternal Aunt     Kidney disease Maternal Aunt     Diabetes Maternal Uncle     Amblyopia Neg Hx     Blindness Neg Hx     Cataracts Neg Hx     Glaucoma Neg Hx     Macular degeneration Neg Hx     Retinal detachment Neg Hx     Strabismus Neg Hx        Social History     Socioeconomic History    Marital  status:     Number of children: 0    Highest education level: Master's degree (e.g., MA, MS, Lelo, MEd, MSW, BANDAR)   Occupational History    Occupation: teacher     Comment: retired   Tobacco Use    Smoking status: Never    Smokeless tobacco: Never   Substance and Sexual Activity    Alcohol use: No     Alcohol/week: 0.0 standard drinks of alcohol    Drug use: No    Sexual activity: Not Currently     Birth control/protection: Abstinence   Social History Narrative    Single ()             Brother passed away last year.  Pt lives her her sister. (5/27)     Social Determinants of Health     Financial Resource Strain: Low Risk  (5/31/2023)    Overall Financial Resource Strain (CARDIA)     Difficulty of Paying Living Expenses: Not hard at all   Food Insecurity: No Food Insecurity (5/31/2023)    Hunger Vital Sign     Worried About Running Out of Food in the Last Year: Never true     Ran Out of Food in the Last Year: Never true   Transportation Needs: No Transportation Needs (5/31/2023)    PRAPARE - Transportation     Lack of Transportation (Medical): No     Lack of Transportation (Non-Medical): No   Physical Activity: Inactive (5/31/2023)    Exercise Vital Sign     Days of Exercise per Week: 0 days     Minutes of Exercise per Session: 0 min   Stress: No Stress Concern Present (5/31/2023)    [employer] [place] [employer] - Occupational Stress Questionnaire     Feeling of Stress : Not at all   Social Connections: Moderately Isolated (5/31/2023)    Social Connection and Isolation Panel [NHANES]     Frequency of Communication with Friends and Family: More than three times a week     Frequency of Social Gatherings with Friends and Family: Twice a week     Attends [employer]ism Services: More than 4 times per year     Active Member of Clubs or Organizations: No     Attends Club or Organization Meetings: Never     Marital Status:    Housing Stability: Low Risk  (5/31/2023)    Housing Stability Vital  "Sign     Unable to Pay for Housing in the Last Year: No     Number of Places Lived in the Last Year: 1     Unstable Housing in the Last Year: No       Allergies:  Patient has no known allergies.    Medications:    Current Outpatient Medications:     amLODIPine (NORVASC) 5 MG tablet, Take 5 mg by mouth once daily., Disp: , Rfl:     anastrozole (ARIMIDEX) 1 mg Tab, Take 1 tablet (1 mg total) by mouth once daily., Disp: 90 tablet, Rfl: 2    apixaban (ELIQUIS) 5 mg Tab, Take 1 tablet (5 mg total) by mouth 2 (two) times daily., Disp: 180 tablet, Rfl: 3    apixaban (ELIQUIS) 5 mg Tab, Take 1 tablet by mouth 2 (two) times daily., Disp: , Rfl:     atorvastatin (LIPITOR) 80 MG tablet, Take 1 tablet (80 mg total) by mouth once daily., Disp: 90 tablet, Rfl: 3    BD INSULIN SYRINGE ULTRA-FINE 0.5 mL 31 gauge x 5/16" Syrg, USE WITH INSULIN 4 TIMES A DAY, Disp: 100 each, Rfl: 12    calcium acetate 667 mg Tab, 1 capsule 3 TIMES DAILY (route: oral), Disp: , Rfl:     calcium acetate,phosphat bind, (PHOSLO) 667 mg capsule, Take by mouth 3 (three) times daily., Disp: , Rfl:     calcium acetate,phosphat bind, (PHOSLO) 667 mg tablet, Take 1 tablet (667 mg total) by mouth 3 (three) times daily with meals., Disp: 90 tablet, Rfl: 11    carvediloL (COREG) 6.25 MG tablet, Take 1 tablet (6.25 mg total) by mouth 2 (two) times daily., Disp: 60 tablet, Rfl: 11    DULoxetine (CYMBALTA) 20 MG capsule, Take 20 mg by mouth 2 (two) times daily., Disp: , Rfl:     erenumab-aooe (AIMOVIG AUTOINJECTOR) 140 mg/mL autoinjector, Inject 1 mL (140 mg total) into the skin every 30 days. No further refills without appointment, Disp: 1 mL, Rfl: 2    erenumab-aooe (AIMOVIG AUTOINJECTOR) 140 mg/mL autoinjector, 140 mg MONTHLY (route: subcutaneous), Disp: , Rfl:     ergocalciferol (ERGOCALCIFEROL) 50,000 unit Cap, TAKE ONE CAPSULE BY MOUTH ONE TIME PER WEEK, TAKES ON TUESDAYS, Disp: 12 capsule, Rfl: 3    furosemide (LASIX) 40 MG tablet, Take 1 tablet (40 mg total) " "by mouth once daily., Disp: 30 tablet, Rfl: 11    furosemide (LASIX) 40 MG tablet, Take 1 tablet by mouth once daily., Disp: , Rfl:     GAVILYTE-C 240-22.72-6.72 -5.84 gram SolR, Take 4,000 mLs by mouth once., Disp: , Rfl:     HYDROcodone-acetaminophen (NORCO)  mg per tablet, Take 1 tablet by mouth every 6 (six) hours as needed for Pain., Disp: 120 tablet, Rfl: 0    insulin (LANTUS SOLOSTAR U-100 INSULIN) glargine 100 units/mL SubQ pen, INJECT 14-15 UNITS UNDER THE SKIN ONCE DAILY (Patient taking differently: INJECT 14-16 UNITS UNDER THE SKIN ONCE DAILY), Disp: 15 each, Rfl: 3    isosorbide mononitrate (IMDUR) 30 MG 24 hr tablet, Take 1 tablet (30 mg total) by mouth once daily., Disp: 30 tablet, Rfl: 11    isosorbide mononitrate (IMDUR) 30 MG 24 hr tablet, Take 1 tablet by mouth once daily., Disp: , Rfl:     magnesium oxide (MAG-OX) 400 mg (241.3 mg magnesium) tablet, Take 1 tablet (400 mg total) by mouth once daily., Disp: 30 tablet, Rfl: 2    magnesium oxide (MAGOX) 400 mg (241.3 mg magnesium) tablet, Take 1 tablet by mouth once daily., Disp: , Rfl:     methocarbamoL (ROBAXIN) 750 MG Tab, TAKE 1-2 TABLETS (750-1,500 MG TOTAL) BY MOUTH 3 (THREE) TIMES DAILY AS NEEDED (MUSCLE SPASMS)., Disp: 180 tablet, Rfl: 1    NIFEdipine (ADALAT CC) 90 MG TbSR, Take 1 tablet by mouth once daily., Disp: , Rfl:     NIFEdipine (PROCARDIA-XL) 90 MG (OSM) 24 hr tablet, Take 1 tablet (90 mg total) by mouth once daily., Disp: 30 tablet, Rfl: 11    oxybutynin (DITROPAN-XL) 10 MG 24 hr tablet, TAKE 1 TABLET BY MOUTH EVERY DAY, Disp: 90 tablet, Rfl: 4    pen needle, diabetic (BD ULTRA-FINE SHORT PEN NEEDLE) 31 gauge x 5/16" Ndle, USE WITH LANTUS DAILY, e 11.65, Disp: 100 each, Rfl: 3    sodium bicarbonate 650 MG tablet, Take 1 tablet (650 mg total) by mouth 3 (three) times daily., Disp: 90 tablet, Rfl: 11    SYNTHROID 50 mcg tablet, TAKE 1 TABLET BY MOUTH BEFORE BREAKFAST. ADMINISTER ON AN EMPTY STOMACH AT LEAST 30 MINUTES BEFORE " FOOD. IF RECEIVING TUBE FEEDS, HOLD TUBE FEEDS FOR 1 HOUR BEFORE AND AFTER LEVOTHYROXINE ADMINISTRATION., Disp: 90 tablet, Rfl: 2    traZODone (DESYREL) 100 MG tablet, TAKE 1 TABLET BY MOUTH NIGHTLY AS NEEDED FOR INSOMNIA., Disp: 90 tablet, Rfl: 2    sumatriptan (IMITREX) 100 MG tablet, Take 1 tablet (100 mg total) by mouth 2 (two) times daily as needed for Migraine., Disp: 30 tablet, Rfl: 1  No current facility-administered medications for this visit.    Facility-Administered Medications Ordered in Other Visits:     heparin (porcine) injection 2,000 Units, 2,000 Units, Intravenous, Once, Emmanuel Hare Jr., MD    PHYSICAL EXAMINATION:  Physical Exam  Constitutional:       Appearance: She is obese.   HENT:      Head: Normocephalic and atraumatic.      Right Ear: External ear normal.      Left Ear: External ear normal.      Nose: Nose normal.      Mouth/Throat:      Mouth: Mucous membranes are moist.   Pulmonary:      Effort: Pulmonary effort is normal. No respiratory distress.   Skin:     General: Skin is warm and dry.   Neurological:      General: No focal deficit present.      Mental Status: She is alert and oriented to person, place, and time.   Psychiatric:         Mood and Affect: Mood normal.         Behavior: Behavior normal.           Lab Results   Component Value Date    CREATININE 1.91 (H) 2023    EGFRNORACEVR 24.7 (A) 10/10/2023           IMPRESSION:  Encounter Diagnoses   Name Primary?    Neurogenic bladder Yes    Chronic suprapubic catheter     Immobility     Urinary retention          Assessment:       1. Neurogenic bladder    2. Chronic suprapubic catheter    3. Immobility    4. Urinary retention        Plan:   Name,  and allergies verified  I removed existing SPT without any difficulty and properly disposed, dirty gloves removed, hand hygiene performed.   Stoma cleaned with betadine.  I easily placed a 16 fr SPT using sterile technique. SPT flushed with 20 ml sterile water to confirm  placement.  Clear yellow urine received and balloon inflated by with 10 ml sterile water.   Tolerated well.  Site cleaned.   Daily skin care and suprapubic catheter care discussed.  Educational materials given.  Increase water intake to help with sediment.   RTC 4 weeks for next SPT change.  Voiced understanding.         I spent 30 minutes with the patient of which more than half was spent in direct consultation with the patient in regards to our treatment and plan.  We addressed the office findings and recent labs.

## 2023-12-07 ENCOUNTER — HOSPITAL ENCOUNTER (OUTPATIENT)
Facility: HOSPITAL | Age: 73
Discharge: HOME | End: 2023-12-07
Attending: INTERNAL MEDICINE | Admitting: INTERNAL MEDICINE
Payer: MEDICARE

## 2023-12-07 ENCOUNTER — ANESTHESIA (OUTPATIENT)
Dept: ENDOSCOPY | Facility: HOSPITAL | Age: 73
End: 2023-12-07
Payer: MEDICARE

## 2023-12-07 ENCOUNTER — ANESTHESIA EVENT (OUTPATIENT)
Dept: ENDOSCOPY | Facility: HOSPITAL | Age: 73
End: 2023-12-07
Payer: MEDICARE

## 2023-12-07 VITALS
OXYGEN SATURATION: 99 % | RESPIRATION RATE: 16 BRPM | WEIGHT: 230 LBS | BODY MASS INDEX: 34.86 KG/M2 | HEIGHT: 68 IN | DIASTOLIC BLOOD PRESSURE: 61 MMHG | HEART RATE: 72 BPM | TEMPERATURE: 98 F | SYSTOLIC BLOOD PRESSURE: 134 MMHG

## 2023-12-07 DIAGNOSIS — Z12.11 SCREEN FOR COLON CANCER: ICD-10-CM

## 2023-12-07 PROCEDURE — D9220A PRA ANESTHESIA: Mod: PT,HCNC,CRNA, | Performed by: NURSE ANESTHETIST, CERTIFIED REGISTERED

## 2023-12-07 PROCEDURE — 45380 COLONOSCOPY AND BIOPSY: CPT | Mod: PT,HCNC | Performed by: STUDENT IN AN ORGANIZED HEALTH CARE EDUCATION/TRAINING PROGRAM

## 2023-12-07 PROCEDURE — 27201012 HC FORCEPS, HOT/COLD, DISP: Mod: HCNC | Performed by: STUDENT IN AN ORGANIZED HEALTH CARE EDUCATION/TRAINING PROGRAM

## 2023-12-07 PROCEDURE — 88305 TISSUE EXAM BY PATHOLOGIST: CPT | Mod: 26,HCNC,, | Performed by: STUDENT IN AN ORGANIZED HEALTH CARE EDUCATION/TRAINING PROGRAM

## 2023-12-07 PROCEDURE — 63600175 PHARM REV CODE 636 W HCPCS: Mod: HCNC | Performed by: NURSE ANESTHETIST, CERTIFIED REGISTERED

## 2023-12-07 PROCEDURE — 37000009 HC ANESTHESIA EA ADD 15 MINS: Mod: HCNC | Performed by: STUDENT IN AN ORGANIZED HEALTH CARE EDUCATION/TRAINING PROGRAM

## 2023-12-07 PROCEDURE — 45380 PR COLONOSCOPY,BIOPSY: ICD-10-PCS | Mod: PT,HCNC,, | Performed by: STUDENT IN AN ORGANIZED HEALTH CARE EDUCATION/TRAINING PROGRAM

## 2023-12-07 PROCEDURE — 45380 COLONOSCOPY AND BIOPSY: CPT | Mod: PT,HCNC,, | Performed by: STUDENT IN AN ORGANIZED HEALTH CARE EDUCATION/TRAINING PROGRAM

## 2023-12-07 PROCEDURE — 37000008 HC ANESTHESIA 1ST 15 MINUTES: Mod: HCNC | Performed by: STUDENT IN AN ORGANIZED HEALTH CARE EDUCATION/TRAINING PROGRAM

## 2023-12-07 PROCEDURE — 88305 TISSUE EXAM BY PATHOLOGIST: ICD-10-PCS | Mod: 26,HCNC,, | Performed by: STUDENT IN AN ORGANIZED HEALTH CARE EDUCATION/TRAINING PROGRAM

## 2023-12-07 PROCEDURE — 25000003 PHARM REV CODE 250: Mod: HCNC | Performed by: NURSE ANESTHETIST, CERTIFIED REGISTERED

## 2023-12-07 PROCEDURE — D9220A PRA ANESTHESIA: ICD-10-PCS | Mod: PT,HCNC,ANES, | Performed by: ANESTHESIOLOGY

## 2023-12-07 PROCEDURE — 88305 TISSUE EXAM BY PATHOLOGIST: CPT | Mod: HCNC | Performed by: STUDENT IN AN ORGANIZED HEALTH CARE EDUCATION/TRAINING PROGRAM

## 2023-12-07 PROCEDURE — D9220A PRA ANESTHESIA: Mod: PT,HCNC,ANES, | Performed by: ANESTHESIOLOGY

## 2023-12-07 PROCEDURE — D9220A PRA ANESTHESIA: ICD-10-PCS | Mod: PT,HCNC,CRNA, | Performed by: NURSE ANESTHETIST, CERTIFIED REGISTERED

## 2023-12-07 RX ORDER — LIDOCAINE HYDROCHLORIDE 20 MG/ML
INJECTION INTRAVENOUS
Status: DISCONTINUED | OUTPATIENT
Start: 2023-12-07 | End: 2023-12-07

## 2023-12-07 RX ORDER — ONDANSETRON 2 MG/ML
INJECTION INTRAMUSCULAR; INTRAVENOUS
Status: DISCONTINUED | OUTPATIENT
Start: 2023-12-07 | End: 2023-12-07

## 2023-12-07 RX ORDER — DEXAMETHASONE SODIUM PHOSPHATE 4 MG/ML
INJECTION, SOLUTION INTRA-ARTICULAR; INTRALESIONAL; INTRAMUSCULAR; INTRAVENOUS; SOFT TISSUE
Status: DISCONTINUED | OUTPATIENT
Start: 2023-12-07 | End: 2023-12-07

## 2023-12-07 RX ORDER — PROPOFOL 10 MG/ML
VIAL (ML) INTRAVENOUS
Status: DISCONTINUED | OUTPATIENT
Start: 2023-12-07 | End: 2023-12-07

## 2023-12-07 RX ORDER — SODIUM CHLORIDE 9 MG/ML
INJECTION, SOLUTION INTRAVENOUS CONTINUOUS
Status: DISCONTINUED | OUTPATIENT
Start: 2023-12-07 | End: 2023-12-07 | Stop reason: HOSPADM

## 2023-12-07 RX ADMIN — ONDANSETRON 4 MG: 2 INJECTION INTRAMUSCULAR; INTRAVENOUS at 02:12

## 2023-12-07 RX ADMIN — DEXAMETHASONE SODIUM PHOSPHATE 4 MG: 4 INJECTION, SOLUTION INTRAMUSCULAR; INTRAVENOUS at 02:12

## 2023-12-07 RX ADMIN — LIDOCAINE HYDROCHLORIDE 100 MG: 20 INJECTION INTRAVENOUS at 02:12

## 2023-12-07 RX ADMIN — SODIUM CHLORIDE: 0.9 INJECTION, SOLUTION INTRAVENOUS at 02:12

## 2023-12-07 RX ADMIN — PROPOFOL 70 MG: 10 INJECTION, EMULSION INTRAVENOUS at 02:12

## 2023-12-07 NOTE — ANESTHESIA POSTPROCEDURE EVALUATION
Anesthesia Post Evaluation    Patient: Cecile Bowen    Procedure(s) Performed: Procedure(s) (LRB):  COLONOSCOPY (N/A)    Final Anesthesia Type: general      Level of consciousness: awake and alert  Post-procedure vital signs: reviewed and stable  Pain control: Pain has been treated.  Airway patency: patent    PONV status: Absent or treated.  Anesthetic complications: no      Cardiovascular status: hemodynamically stable  Respiratory status: unassisted  Hydration status: euvolemic                Vitals Value Taken Time   /61 12/07/23 1525   Temp 36.5 °C (97.7 °F) 12/07/23 1454   Pulse 72 12/07/23 1525   Resp 16 12/07/23 1525   SpO2 99 % 12/07/23 1525         No case tracking events are documented in the log.      Pain/Audrey Score: Audrey Score: 10 (12/7/2023  3:11 PM)

## 2023-12-07 NOTE — PROGRESS NOTES
Discharge instructions reviewed w/pt and sister, verbalized understanding. Pt in NADN.No complaints at this time. Tolerated liquids w/ no issues. D/'d in personal wheelchair w/ sister w/ spd transportation.

## 2023-12-07 NOTE — PROVATION PATIENT INSTRUCTIONS
Discharge Summary/Instructions after an Endoscopic Procedure  Patient Name: Cecile Bowen  Patient MRN: [id number]  Patient YOB: 1950 Thursday, December 7, 2023  Herve Allen MD  Dear patient,  As a result of recent federal legislation (The Federal Cures Act), you may   receive lab or pathology results from your procedure in your [facility]   account before your physician is able to contact you. Your physician or   their representative will relay the results to you with their   recommendations at their soonest availability.  Thank you,  RESTRICTIONS:  During your procedure today, you received medications for sedation.  These   medications may affect your judgment, balance and coordination.  Therefore,   for 24 hours, you have the following restrictions:   - DO NOT drive a car, operate machinery, make legal/financial decisions,   sign important papers or drink alcohol.    ACTIVITY:  Today: no heavy lifting, straining or running due to procedural   sedation/anesthesia.  The following day: return to full activity including work.  DIET:  Eat and drink normally unless instructed otherwise.     TREATMENT FOR COMMON SIDE EFFECTS:  - Mild abdominal pain, nausea, belching, bloating or excessive gas:  rest,   eat lightly and use a heating pad.  - Sore Throat: treat with throat lozenges and/or gargle with warm salt   water.  - Because air was used during the procedure, expelling large amounts of air   from your rectum or belching is normal.  - If a bowel prep was taken, you may not have a bowel movement for 1-3 days.    This is normal.  SYMPTOMS TO WATCH FOR AND REPORT TO YOUR PHYSICIAN:  1. Abdominal pain or bloating, other than gas cramps.  2. Chest pain.  3. Back pain.  4. Signs of infection such as: chills or fever occurring within 24 hours   after the procedure.  5. Rectal bleeding, which would show as bright red, maroon, or black stools.   (A tablespoon of blood from the rectum is not serious, especially  if   hemorrhoids are present.)  6. Vomiting.  7. Weakness or dizziness.  GO DIRECTLY TO THE NEAREST EMERGENCY ROOM IF YOU HAVE ANY OF THE FOLLOWING:      Difficulty breathing              Chills and/or fever over 101 F   Persistent vomiting and/or vomiting blood   Severe abdominal pain   Severe chest pain   Black, tarry stools   Bleeding- more than one tablespoon   Any other symptom or condition that you feel may need urgent attention  Your doctor recommends these additional instructions:  If any biopsies were taken, your doctors clinic will contact you in 1 to 2   weeks with any results.  - Discharge patient to home (ambulatory).   - Patient has a contact number available for emergencies.  The signs and   symptoms of potential delayed complications were discussed with the   patient.  Return to normal activities tomorrow.  Written discharge   instructions were provided to the patient.   - Resume previous diet.   - Continue present medications.   - Return to primary care physician as previously scheduled.   - Repeat colonoscopy is not recommended for surveillance given single small   polyp found and history of comorbidities.  - OK to resume Eliquis tomorrow.  For questions, problems or results please call your physician - Herve Allen MD at Work:  [phone].  [facility], EMERGENCY ROOM PHONE NUMBER: [phone]  IF A COMPLICATION OR EMERGENCY SITUATION ARISES AND YOU ARE UNABLE TO REACH   YOUR PHYSICIAN - GO DIRECTLY TO THE EMERGENCY ROOM.  Herve Allen MD  12/7/2023 2:45:15 PM  This report has been verified and signed electronically.  Dear patient,  As a result of recent federal legislation (The Federal Cures Act), you may   receive lab or pathology results from your procedure in your [facility]   account before your physician is able to contact you. Your physician or   their representative will relay the results to you with their   recommendations at their soonest  availability.  Thank you,  PROVATION

## 2023-12-07 NOTE — TRANSFER OF CARE
"Anesthesia Transfer of Care Note    Patient: Cecile Bowen    Procedure(s) Performed: Procedure(s) (LRB):  COLONOSCOPY (N/A)    Patient location: PACU    Anesthesia Type: general    Transport from OR: Transported from OR on 6-10 L/min O2 by face mask with adequate spontaneous ventilation    Post pain: adequate analgesia    Post assessment: no apparent anesthetic complications and tolerated procedure well    Post vital signs: stable    Level of consciousness: awake, alert and oriented    Nausea/Vomiting: no nausea/vomiting    Complications: none    Transfer of care protocol was followed      Last vitals: Visit Vitals  BP (!) 159/73   Pulse 66   Temp 36.7 °C (98.1 °F) (Temporal)   Resp 16   Ht 5' 8" (1.727 m)   Wt 104.3 kg (230 lb)   SpO2 100%   Breastfeeding No   BMI 34.97 kg/m²     "

## 2023-12-07 NOTE — ANESTHESIA PREPROCEDURE EVALUATION
12/07/2023  Cecile Bowen is a 73 y.o., female  PMHx of HTN, HLD, hypothyroidism, a fib on eliquis, DM2, obesity, VIJAYA, chronic pain, neurogenic bladder with suprapubic catheter, and CKD4 on HD on Monday and Fridays, last was on Monday this week. Pt presented to GI with active emesis. Discussed doing case on the 2nd floor with appropriate equipment.    Pre-operative evaluation for Procedure(s) (LRB):  COLONOSCOPY (N/A)    Cecile Bowen is a 73 y.o. female     Patient Active Problem List   Diagnosis    Hypothyroidism    Fibromyalgia    Intractable chronic migraine without aura    Vitamin D deficiency disease    Hyperlipidemia associated with type 2 diabetes mellitus    VIJAYA (obstructive sleep apnea)    Hyponatremia    Abnormality of gait and mobility    Osteoarthritis of lumbar spine    Recurrent urinary tract infection    Sideroblastic anemia    Iron deficiency anemia    Primary osteoarthritis involving multiple joints    Urinary retention    Normal anion gap metabolic acidosis    Neurogenic bladder    Major depressive disorder, recurrent episode, moderate    Insomnia    Mixed migraine and muscle contraction headache    Secondary hyperparathyroidism, renal    Suprapubic catheter    Pulmonary nodule    Osteopenia    Class 1 obesity with serious comorbidity and body mass index (BMI) of 34.0 to 34.9 in adult    Chronic daily headache    Long term prescription opiate use    Lumbar spondylosis    Chronic pain    Cervicogenic migraine    Malignant neoplasm of left breast, estrogen receptor positive    Risk for falls    Facet arthritis of lumbar region    Obesity, diabetes, and hypertension syndrome    Requires daily assistance for activities of daily living (ADL) and comfort needs    S/P left mastectomy    Prophylactic use of anastrozole (Arimidex)    Type 2 diabetes mellitus with stage 4 chronic kidney disease,  "with long-term current use of insulin    High priority for 2019 novel coronavirus vaccination    CKD (chronic kidney disease) stage 4, GFR 15-29 ml/min    Nephrotic range proteinuria    Left ureteral stone    Nephrolithiasis    Transaminitis    Chronic obstructive pyelonephritis    UTI (urinary tract infection)    Normocytic anemia    Acute kidney injury superimposed on CKD    A-fib    Debility    Encounter for palliative care    Constipation    Dependence on hemodialysis    Hypertension associated with diabetes    Chronic kidney disease-mineral and bone disorder    Anemia of chronic disease    Urinary tract infection due to ESBL Klebsiella    Pressure injury of right buttock, stage 3    End stage renal disease    Acute cystitis with hematuria    Uncomplicated opioid dependence    History of falling    Long term current use of insulin    Personal history of malignant neoplasm of breast    Aortic atherosclerosis    Diabetic polyneuropathy associated with type 2 diabetes mellitus    Bacteria in urine    Hyperphosphatemia    Irritant contact dermatitis due to fecal, urinary or dual incontinence    Volume overload    Elevated LFTs    Hypertension secondary to endocrine disorders    Personal history of urinary (tract) infections    Fatty liver disease, nonalcoholic       Review of patient's allergies indicates:  No Known Allergies    No current facility-administered medications on file prior to encounter.     Current Outpatient Medications on File Prior to Encounter   Medication Sig Dispense Refill    amLODIPine (NORVASC) 5 MG tablet Take 5 mg by mouth once daily.      anastrozole (ARIMIDEX) 1 mg Tab Take 1 tablet (1 mg total) by mouth once daily. 90 tablet 2    apixaban (ELIQUIS) 5 mg Tab Take 1 tablet (5 mg total) by mouth 2 (two) times daily. 180 tablet 3    atorvastatin (LIPITOR) 80 MG tablet Take 1 tablet (80 mg total) by mouth once daily. 90 tablet 3    BD INSULIN SYRINGE ULTRA-FINE 0.5 mL 31 gauge x 5/16" Syrg USE " "WITH INSULIN 4 TIMES A  each 12    calcium acetate,phosphat bind, (PHOSLO) 667 mg tablet Take 1 tablet (667 mg total) by mouth 3 (three) times daily with meals. 90 tablet 11    carvediloL (COREG) 6.25 MG tablet Take 1 tablet (6.25 mg total) by mouth 2 (two) times daily. 60 tablet 11    DULoxetine (CYMBALTA) 20 MG capsule Take 20 mg by mouth 2 (two) times daily.      ergocalciferol (ERGOCALCIFEROL) 50,000 unit Cap TAKE ONE CAPSULE BY MOUTH ONE TIME PER WEEK, TAKES ON TUESDAYS 12 capsule 3    furosemide (LASIX) 40 MG tablet Take 1 tablet (40 mg total) by mouth once daily. 30 tablet 11    insulin (LANTUS SOLOSTAR U-100 INSULIN) glargine 100 units/mL SubQ pen INJECT 14-15 UNITS UNDER THE SKIN ONCE DAILY (Patient taking differently: INJECT 14-16 UNITS UNDER THE SKIN ONCE DAILY) 15 each 3    isosorbide mononitrate (IMDUR) 30 MG 24 hr tablet Take 1 tablet (30 mg total) by mouth once daily. 30 tablet 11    magnesium oxide (MAG-OX) 400 mg (241.3 mg magnesium) tablet Take 1 tablet (400 mg total) by mouth once daily. 30 tablet 2    NIFEdipine (PROCARDIA-XL) 90 MG (OSM) 24 hr tablet Take 1 tablet (90 mg total) by mouth once daily. 30 tablet 11    pen needle, diabetic (BD ULTRA-FINE SHORT PEN NEEDLE) 31 gauge x 5/16" Ndle USE WITH LANTUS DAILY, e 11.65 100 each 3    sodium bicarbonate 650 MG tablet Take 1 tablet (650 mg total) by mouth 3 (three) times daily. 90 tablet 11    sumatriptan (IMITREX) 100 MG tablet Take 1 tablet (100 mg total) by mouth 2 (two) times daily as needed for Migraine. 30 tablet 1    SYNTHROID 50 mcg tablet TAKE 1 TABLET BY MOUTH BEFORE BREAKFAST. ADMINISTER ON AN EMPTY STOMACH AT LEAST 30 MINUTES BEFORE FOOD. IF RECEIVING TUBE FEEDS, HOLD TUBE FEEDS FOR 1 HOUR BEFORE AND AFTER LEVOTHYROXINE ADMINISTRATION. 90 tablet 2       Past Surgical History:   Procedure Laterality Date    BIOPSY OF URETER Left 2/18/2022    Procedure: BIOPSY, URETER;  Surgeon: oRnel Whittington MD;  Location: [facility] OR [facility] [place] FLR;  Service: Urology;  Laterality: Left;    BREAST BIOPSY Right     benign    CHOLECYSTECTOMY      COLONOSCOPY N/A 1/13/2017    Procedure: COLONOSCOPY;  Surgeon: Morris Wiseman MD;  Location: [facility] ENDO (4TH FLR);  Service: Endoscopy;  Laterality: N/A;  Renal pt Nephrology advised to avoid phosphate preps    CYSTOSCOPY N/A 10/8/2018    Procedure: CYSTOSCOPY;  Surgeon: Ronel Whittington MD;  Location: [facility] OR 1ST FLR;  Service: Urology;  Laterality: N/A;  45 min    CYSTOSCOPY N/A 3/25/2019    Procedure: CYSTOSCOPY;  Surgeon: Ronel Whittington MD;  Location: [facility] OR 1ST FLR;  Service: Urology;  Laterality: N/A;  45 min    CYSTOSCOPY N/A 8/26/2019    Procedure: CYSTOSCOPY;  Surgeon: Ronel Whittington MD;  Location: [facility] OR 1ST FLR;  Service: Urology;  Laterality: N/A;  45 min    CYSTOSCOPY N/A 7/2/2021    Procedure: CYSTOSCOPY;  Surgeon: Ronel Whittington MD;  Location: [facility] OR 1ST FLR;  Service: Urology;  Laterality: N/A;    CYSTOSCOPY  12/23/2021    Procedure: CYSTOSCOPY;  Surgeon: Ronel Whittington MD;  Location: [facility] OR 1ST FLR;  Service: Urology;;    CYSTOSCOPY  2/18/2022    Procedure: CYSTOSCOPY;  Surgeon: Ronel Whittington MD;  [facility];  Service: Urology;;    CYSTOSCOPY W/ URETERAL STENT PLACEMENT Left 5/15/2021    Procedure: CYSTOSCOPY, WITH URETERAL STENT INSERTION;  Surgeon: Levy Sánchez Jr., MD;  Location: [facility] OR 1ST FLR;  Service: Urology;  Laterality: Left;    CYSTOSCOPY WITH BIOPSY OF BLADDER N/A 1/27/2020    Procedure: CYSTOSCOPY, WITH BLADDER BIOPSY, WITH FULGURATION IF INDICATED;  Surgeon: Ronel Whittington MD;  Location: [facility] OR 1ST FLR;  Service: Urology;  Laterality: N/A;    DILATION AND CURETTAGE OF UTERUS      GALLBLADDER SURGERY  2006    HYSTERECTOMY      INJECTION FOR SENTINEL NODE IDENTIFICATION Left 8/1/2019    Procedure: INJECTION, FOR SENTINEL NODE IDENTIFICATION;  Surgeon: Samia Fulton MD;  Location: [facility] OR [address] [place] [address];  Service:  General;  Laterality: Left;    INJECTION OF BOTULINUM TOXIN TYPE A N/A 10/8/2018    Procedure: INJECTION, BOTULINUM TOXIN, TYPE A 300 UNITS;  Surgeon: Ronel Whittington MD;  [facility];  Service: Urology;  Laterality: N/A;    INJECTION OF BOTULINUM TOXIN TYPE A N/A 3/25/2019    Procedure: INJECTION, BOTULINUM TOXIN, TYPE A 300 UNITS;  Surgeon: Ronel Whittington MD;  [facility];  Service: Urology;  Laterality: N/A;    INJECTION OF BOTULINUM TOXIN TYPE A N/A 8/26/2019    Procedure: INJECTION, BOTULINUM TOXIN, TYPE A 300 UNITS;  Surgeon: Ronel Whittington MD;  Location: [facility] OR [address] [place] [address];  Service: Urology;  Laterality: N/A;    MASTECTOMY Left 8/1/2019    Procedure: MASTECTOMY 23 hour stay;  Surgeon: Smaia Fulton MD;  Location: [facility] OR [address] [place] [address];  Service: General;  Laterality: Left;    MEDIPORT REMOVAL Right 6/24/2022    Procedure: REMOVAL, CATHETER, CENTRAL VENOUS, TUNNELED, WITH PORT;  Surgeon: Isauro Anderson MD;  Location: [facility] CATH LAB;  Service: Radiology;  Laterality: Right;    OVARIAN CYST SURGERY  1985    REPLACEMENT OF STENT Left 12/23/2021    Procedure: REPLACEMENT, STENT;  Surgeon: Ronel Whittington MD;  [facility];  Service: Urology;  Laterality: Left;    REPLACEMENT OF STENT Left 2/18/2022    Procedure: REPLACEMENT, STENT;  Surgeon: Ronel Whittington MD;  [facility];  Service: Urology;  Laterality: Left;    RETROGRADE PYELOGRAPHY Left 2/18/2022    Procedure: PYELOGRAM, RETROGRADE;  Surgeon: Ronel Whittington MD;  [facility];  Service: Urology;  Laterality: Left;    SENTINEL LYMPH NODE BIOPSY Left 8/1/2019    Procedure: BIOPSY, LYMPH NODE, SENTINEL;  Surgeon: Samia Fulton MD;  Location: [facility] OR 2ND FLR;  Service: General;  Laterality: Left;    spt placement      TONSILLECTOMY, ADENOIDECTOMY      TOTAL ABDOMINAL HYSTERECTOMY W/ BILATERAL SALPINGOOPHORECTOMY  1985    URETEROSCOPY Left 2/18/2022     Procedure: URETEROSCOPY;  Surgeon: Ronel Whittington MD;  Location: [address];  Service: Urology;  Laterality: Left;       Social History     Socioeconomic History    Marital status:     Number of children: 0    Highest education level: Master's degree (e.g., MA, MS, Lelo, MEd, MSW, BANDAR)   Occupational History    Occupation: teacher     Comment: retired   Tobacco Use    Smoking status: Never    Smokeless tobacco: Never   Substance and Sexual Activity    Alcohol use: No     Alcohol/week: 0.0 standard drinks of alcohol    Drug use: No    Sexual activity: Not Currently     Birth control/protection: Abstinence   Social History Narrative    Single ()             Brother passed away last year.  Pt lives her her sister. (5/27)     Social Determinants of Health     Financial Resource Strain: Low Risk  (5/31/2023)    Overall Financial Resource Strain (CARDIA)     Difficulty of Paying Living Expenses: Not hard at all   Food Insecurity: No Food Insecurity (5/31/2023)    Hunger Vital Sign     Worried About Running Out of Food in the Last Year: Never true     Ran Out of Food in the Last Year: Never true   Transportation Needs: No Transportation Needs (5/31/2023)    PRAPARE - Transportation     Lack of Transportation (Medical): No     Lack of Transportation (Non-Medical): No   Physical Activity: Inactive (5/31/2023)    Exercise Vital Sign     Days of Exercise per Week: 0 days     Minutes of Exercise per Session: 0 min   Stress: No Stress Concern Present (5/31/2023)    [employer] [place] [employer] - Occupational Stress Questionnaire     Feeling of Stress : Not at all   Social Connections: Moderately Isolated (5/31/2023)    Social Connection and Isolation Panel [NHANES]     Frequency of Communication with Friends and Family: More than three times a week     Frequency of Social Gatherings with Friends and Family: Twice a week     Attends Yazidi Services: More than 4 times per year     Active  Member of Clubs or Organizations: No     Attends Club or Organization Meetings: Never     Marital Status:    Housing Stability: Low Risk  (2023)    Housing Stability Vital Sign     Unable to Pay for Housing in the Last Year: No     Number of Places Lived in the Last Year: 1     Unstable Housing in the Last Year: No       EKD Echo:  Summary         Left Ventricle: The left ventricle is normal in size. Ventricular mass is normal. Increased wall thickness. There is concentric remodeling. Normal wall motion. There is normal systolic function. Ejection fraction by visual approximation is 60%. Diastolic function cannot be reliably determined in the presence of mitral annular calcification.    Left Atrium: Left atrium is moderately dilated. The left atrium volume index is 45.8 mL/m2.    Right Ventricle: Normal right ventricular cavity size. Wall thickness is normal. Right ventricle wall motion  is normal. Systolic function is normal.    Right Atrium: Normal right atrial size.    Aortic Valve: There is mild aortic valve sclerosis. There is normal leaflet mobility. There is mild aortic regurgitation.    Mitral Valve: There is bileaflet sclerosis. Moderate mitral annular calcification. There is normal leaflet mobility. There is no significant regurgitation.    Tricuspid Valve: The tricuspid valve is structurally normal. There is normal leaflet mobility. There is mild regurgitation.    Pulmonic Valve: The pulmonic valve is structurally normal. There is normal leaflet mobility. There is no significant regurgitation.    Aorta: Aortic root is normal in size measuring 3.50 cm. Ascending aorta is normal measuring 4.01 cm.    Pulmonary Artery: No pulmonary hypertension. The estimated pulmonary artery systolic pressure is 26 mmHg.    IVC/SVC: Normal venous pressure at 3 mmHg.    Pericardium: The pericardium is normal. There is no pericardial effusion.       Pre-op Assessment    I have reviewed the Patient  Summary Reports.    I have reviewed the NPO Status.   I have reviewed the Medications.     Review of Systems  Anesthesia Hx:  No problems with previous Anesthesia   History of prior surgery of interest to airway management or planning:            Denies Personal Hx of Anesthesia complications.                    Cardiovascular:     Hypertension                                        Pulmonary:        Sleep Apnea                Renal/:  Chronic Renal Disease, ESRD, Dialysis                Hepatic/GI:  Bowel Prep.    Liver Disease,            Musculoskeletal:  Arthritis               Neurological:    Neuromuscular Disease,  Headaches                                 Endocrine:  Diabetes Hypothyroidism              Physical Exam  General: Well nourished, Cooperative, Alert and Oriented    Airway:  Mallampati: / II  Mouth Opening: Normal  TM Distance: Normal  Tongue: Normal    Dental:  Edentulous        Anesthesia Plan  Type of Anesthesia, risks & benefits discussed:    Anesthesia Type: Gen ETT, Gen Natural Airway  Intra-op Monitoring Plan: Standard ASA Monitors  Post Op Pain Control Plan: multimodal analgesia  Induction:  IV  Airway Plan: Direct, Post-Induction  Informed Consent: Informed consent signed with the Patient and all parties understand the risks and agree with anesthesia plan.  All questions answered.   ASA Score: 3    Ready For Surgery From Anesthesia Perspective.     .

## 2023-12-07 NOTE — H&P
Short Stay Endoscopy History and Physical    PCP - Lurdes Navarro MD  Referring Physician - CampbellAnastasiia, DO  [address] [place] [address]    Procedure - Colonoscopy  ASA - per anesthesia  Mallampati - per anesthesia  History of Anesthesia problems - no  Family history Anesthesia problems -  no   Plan of anesthesia - General    HPI  73 y.o. female  Reason for procedure:   Personal history of polyps     ROS:  Constitutional: No fevers, chills, No weight loss  CV: No chest pain  Pulm: No cough, No shortness of breath  GI: see HPI    Medical History:  has a past medical history of Cervicogenic migraine, Chronic pain, CKD (chronic kidney disease) stage 4, GFR 15-29 ml/min, CKD (chronic kidney disease) stage 4, GFR 15-29 ml/min, Diabetes mellitus, Dialysis patient (1/21/2022), Fibromyalgia, Hydronephrosis, Hyperlipidemia, Hypertension (12/12/2012), Hypothyroidism (12/12/2012), ARON (iron deficiency anemia), Insomnia, Levoscoliosis, Malignant neoplasm of upper-outer quadrant of left breast in female, estrogen receptor positive, Metabolic acidosis, Mobility impaired, Nuclear sclerosis - Both Eyes (3/24/2014), VIJAYA (obstructive sleep apnea), Osteopenia, Pulmonary nodule, Recurrent UTI, Renal manifestation of secondary diabetes mellitus, Secondary hyperparathyroidism, renal, and Urinary retention.    Surgical History:  has a past surgical history that includes Total abdominal hysterectomy w/ bilateral salpingoophorectomy (1985); Gallbladder surgery (2006); TONSILLECTOMY, ADENOIDECTOMY; Ovarian cyst surgery (1985); Dilation and curettage of uterus; Cholecystectomy; spt placement; Breast biopsy (Right); Colonoscopy (N/A, 1/13/2017); Cystoscopy (N/A, 10/8/2018); Injection of botulinum toxin type A (N/A, 10/8/2018); Cystoscopy (N/A, 3/25/2019); Injection of botulinum toxin type A (N/A, 3/25/2019); Hysterectomy; Mastectomy (Left, 8/1/2019); Injection for sentinel node identification (Left, 8/1/2019);  "[place] lymph node biopsy (Left, 8/1/2019); Cystoscopy (N/A, 8/26/2019); Injection of botulinum toxin type A (N/A, 8/26/2019); Cystoscopy with biopsy of bladder (N/A, 1/27/2020); Cystoscopy w/ ureteral stent placement (Left, 5/15/2021); Cystoscopy (N/A, 7/2/2021); Cystoscopy (12/23/2021); Replacement of stent (Left, 12/23/2021); Cystoscopy (2/18/2022); Retrograde pyelography (Left, 2/18/2022); Ureteroscopy (Left, 2/18/2022); Replacement of stent (Left, 2/18/2022); Biopsy of ureter (Left, 2/18/2022); and Mediport removal (Right, 6/24/2022).    Family History: family history includes ALS in her mother; Cancer in her brother, maternal grandmother, and paternal grandfather; Diabetes in her maternal aunt, maternal uncle, and sister; Kidney disease in her maternal aunt and sister; Prostate cancer in her brother; Scoliosis in her brother..    Social History:  reports that she has never smoked. She has never used smokeless tobacco. She reports that she does not drink alcohol and does not use drugs.    Review of patient's allergies indicates:  No Known Allergies    Medications:   Medications Prior to Admission   Medication Sig Dispense Refill Last Dose    amLODIPine (NORVASC) 5 MG tablet Take 5 mg by mouth once daily.       anastrozole (ARIMIDEX) 1 mg Tab Take 1 tablet (1 mg total) by mouth once daily. 90 tablet 2     apixaban (ELIQUIS) 5 mg Tab Take 1 tablet (5 mg total) by mouth 2 (two) times daily. 180 tablet 3     apixaban (ELIQUIS) 5 mg Tab Take 1 tablet by mouth 2 (two) times daily.       atorvastatin (LIPITOR) 80 MG tablet Take 1 tablet (80 mg total) by mouth once daily. 90 tablet 3     BD INSULIN SYRINGE ULTRA-FINE 0.5 mL 31 gauge x 5/16" Syrg USE WITH INSULIN 4 TIMES A  each 12     calcium acetate 667 mg Tab 1 capsule 3 TIMES DAILY (route: oral)       calcium acetate,phosphat bind, (PHOSLO) 667 mg capsule Take by mouth 3 (three) times daily.       calcium acetate,phosphat bind, (PHOSLO) 667 mg tablet Take 1 " tablet (667 mg total) by mouth 3 (three) times daily with meals. 90 tablet 11     carvediloL (COREG) 6.25 MG tablet Take 1 tablet (6.25 mg total) by mouth 2 (two) times daily. 60 tablet 11     DULoxetine (CYMBALTA) 20 MG capsule Take 20 mg by mouth 2 (two) times daily.       erenumab-aooe (AIMOVIG AUTOINJECTOR) 140 mg/mL autoinjector Inject 1 mL (140 mg total) into the skin every 30 days. No further refills without appointment 1 mL 2     erenumab-aooe (AIMOVIG AUTOINJECTOR) 140 mg/mL autoinjector 140 mg MONTHLY (route: subcutaneous)       ergocalciferol (ERGOCALCIFEROL) 50,000 unit Cap TAKE ONE CAPSULE BY MOUTH ONE TIME PER WEEK, TAKES ON TUESDAYS 12 capsule 3     furosemide (LASIX) 40 MG tablet Take 1 tablet (40 mg total) by mouth once daily. 30 tablet 11     furosemide (LASIX) 40 MG tablet Take 1 tablet by mouth once daily.       GAVILYTE-C 240-22.72-6.72 -5.84 gram SolR Take 4,000 mLs by mouth once.       HYDROcodone-acetaminophen (NORCO)  mg per tablet Take 1 tablet by mouth every 6 (six) hours as needed for Pain. 120 tablet 0     insulin (LANTUS SOLOSTAR U-100 INSULIN) glargine 100 units/mL SubQ pen INJECT 14-15 UNITS UNDER THE SKIN ONCE DAILY (Patient taking differently: INJECT 14-16 UNITS UNDER THE SKIN ONCE DAILY) 15 each 3     isosorbide mononitrate (IMDUR) 30 MG 24 hr tablet Take 1 tablet (30 mg total) by mouth once daily. 30 tablet 11     isosorbide mononitrate (IMDUR) 30 MG 24 hr tablet Take 1 tablet by mouth once daily.       magnesium oxide (MAG-OX) 400 mg (241.3 mg magnesium) tablet Take 1 tablet (400 mg total) by mouth once daily. 30 tablet 2     magnesium oxide (MAGOX) 400 mg (241.3 mg magnesium) tablet Take 1 tablet by mouth once daily.       methocarbamoL (ROBAXIN) 750 MG Tab TAKE 1-2 TABLETS (750-1,500 MG TOTAL) BY MOUTH 3 (THREE) TIMES DAILY AS NEEDED (MUSCLE SPASMS). 180 tablet 1     NIFEdipine (ADALAT CC) 90 MG TbSR Take 1 tablet by mouth once daily.       NIFEdipine (PROCARDIA-XL) 90 MG  "(OSM) 24 hr tablet Take 1 tablet (90 mg total) by mouth once daily. 30 tablet 11     oxybutynin (DITROPAN-XL) 10 MG 24 hr tablet TAKE 1 TABLET BY MOUTH EVERY DAY 90 tablet 4     pen needle, diabetic (BD ULTRA-FINE SHORT PEN NEEDLE) 31 gauge x 5/16" Ndle USE WITH LANTUS DAILY, e 11.65 100 each 3     sodium bicarbonate 650 MG tablet Take 1 tablet (650 mg total) by mouth 3 (three) times daily. 90 tablet 11     sumatriptan (IMITREX) 100 MG tablet Take 1 tablet (100 mg total) by mouth 2 (two) times daily as needed for Migraine. 30 tablet 1     SYNTHROID 50 mcg tablet TAKE 1 TABLET BY MOUTH BEFORE BREAKFAST. ADMINISTER ON AN EMPTY STOMACH AT LEAST 30 MINUTES BEFORE FOOD. IF RECEIVING TUBE FEEDS, HOLD TUBE FEEDS FOR 1 HOUR BEFORE AND AFTER LEVOTHYROXINE ADMINISTRATION. 90 tablet 2     traZODone (DESYREL) 100 MG tablet TAKE 1 TABLET BY MOUTH NIGHTLY AS NEEDED FOR INSOMNIA. 90 tablet 2        Physical Exam:    Vital Signs: There were no vitals filed for this visit.    General Appearance: Well appearing in no acute distress  Abdomen: Soft, non tender, non distended with normal bowel sounds, no masses    Labs:  Lab Results   Component Value Date    WBC 7.64 11/03/2023    HGB 12.0 11/17/2023    HCT 35.7 (L) 11/17/2023     11/03/2023    CHOL 147 08/21/2023    TRIG 166 (H) 08/21/2023    HDL 42 08/21/2023    ALT 29 11/14/2023    AST 27 11/14/2023     (L) 11/03/2023    K 3.9 11/03/2023     11/03/2023    CREATININE 1.91 (H) 11/20/2023    BUN 21 10/10/2023    CO2 21 (L) 10/10/2023    TSH 0.571 08/06/2023    INR 1.0 08/08/2023    HGBA1C 5.0 10/06/2023       I have explained the risks and benefits of this endoscopic procedure to the patient including but not limited to bleeding, inflammation, infection, perforation, and death.      Vernon Mcelroy MD    "

## 2023-12-08 LAB
GLUCOSE SERPL-MCNC: 116 MG/DL (ref 70–110)
HCO3 UR-SCNC: 22.6 MMOL/L (ref 24–28)
HCT VFR BLD CALC: 30 %PCV (ref 36–54)
PCO2 BLDA: 46.4 MMHG (ref 35–45)
PH SMN: 7.29 [PH] (ref 7.35–7.45)
PO2 BLDA: 21 MMHG (ref 40–60)
POC BE: -4 MMOL/L
POC IONIZED CALCIUM: 1.17 MMOL/L (ref 1.06–1.42)
POC SATURATED O2: 28 % (ref 95–100)
POC TCO2: 24 MMOL/L (ref 24–29)
POCT GLUCOSE: 98 MG/DL (ref 70–110)
POTASSIUM BLD-SCNC: 3.8 MMOL/L (ref 3.5–5.1)
SAMPLE: ABNORMAL
SODIUM BLD-SCNC: 135 MMOL/L (ref 136–145)

## 2023-12-13 LAB
FINAL PATHOLOGIC DIAGNOSIS: NORMAL
GROSS: NORMAL
Lab: NORMAL
MICROSCOPIC EXAM: NORMAL

## 2023-12-14 ENCOUNTER — PATIENT MESSAGE (OUTPATIENT)
Dept: NEPHROLOGY | Facility: CLINIC | Age: 73
End: 2023-12-14
Payer: MEDICARE

## 2023-12-14 ENCOUNTER — PATIENT MESSAGE (OUTPATIENT)
Dept: PHYSICAL MEDICINE AND REHAB | Facility: CLINIC | Age: 73
End: 2023-12-14
Payer: MEDICARE

## 2023-12-15 RX ORDER — HYDROCODONE BITARTRATE AND ACETAMINOPHEN 10; 325 MG/1; MG/1
1 TABLET ORAL EVERY 6 HOURS PRN
Qty: 120 TABLET | Refills: 0 | Status: SHIPPED | OUTPATIENT
Start: 2023-12-15 | End: 2024-01-17 | Stop reason: SDUPTHER

## 2024-01-02 ENCOUNTER — TELEPHONE (OUTPATIENT)
Dept: PODIATRY | Facility: CLINIC | Age: 74
End: 2024-01-02
Payer: MEDICARE

## 2024-01-02 ENCOUNTER — OFFICE VISIT (OUTPATIENT)
Dept: HEPATOLOGY | Facility: CLINIC | Age: 74
End: 2024-01-02
Payer: MEDICARE

## 2024-01-02 DIAGNOSIS — R74.8 ELEVATED ALKALINE PHOSPHATASE LEVEL: Primary | ICD-10-CM

## 2024-01-02 DIAGNOSIS — N18.6 ESRD (END STAGE RENAL DISEASE) ON DIALYSIS: ICD-10-CM

## 2024-01-02 DIAGNOSIS — I15.2 OBESITY, DIABETES, AND HYPERTENSION SYNDROME: ICD-10-CM

## 2024-01-02 DIAGNOSIS — E66.9 OBESITY, DIABETES, AND HYPERTENSION SYNDROME: ICD-10-CM

## 2024-01-02 DIAGNOSIS — Z99.2 ESRD (END STAGE RENAL DISEASE) ON DIALYSIS: ICD-10-CM

## 2024-01-02 DIAGNOSIS — R76.8 POSITIVE ANA (ANTINUCLEAR ANTIBODY): ICD-10-CM

## 2024-01-02 DIAGNOSIS — E11.59 OBESITY, DIABETES, AND HYPERTENSION SYNDROME: ICD-10-CM

## 2024-01-02 DIAGNOSIS — E11.69 OBESITY, DIABETES, AND HYPERTENSION SYNDROME: ICD-10-CM

## 2024-01-02 PROCEDURE — 99214 OFFICE O/P EST MOD 30 MIN: CPT | Mod: HCNC,95,, | Performed by: NURSE PRACTITIONER

## 2024-01-02 NOTE — TELEPHONE ENCOUNTER
Called Cecile to get her rescheduled for nail care at the available office visit with Dr Brock. She gave verbal understanding of the next scheduled office visit on 1/25/24 at 2:15 pm. Reminder placed in the mail today per patient request.

## 2024-01-02 NOTE — PROGRESS NOTES
The patient location is: [place]  The chief complaint leading to consultation is: Elevated liver enzymes    Visit type: audio only    Face to Face time with patient: 20  30 minutes of total time spent on the encounter, which includes face to face time and non-face to face time preparing to see the patient (eg, review of tests), Obtaining and/or reviewing separately obtained history, Documenting clinical information in the electronic or other health record, Independently interpreting results (not separately reported) and communicating results to the patient/family/caregiver, or Care coordination (not separately reported).     Each patient to whom he or she provides medical services by telemedicine is:  (1) informed of the relationship between the physician and patient and the respective role of any other health care provider with respect to management of the patient; and (2) notified that he or she may decline to receive medical services by telemedicine and may withdraw from such care at any time.    Notes:     [facility] Established Patient Visit    Reason for Visit: Elevated liver enzymes    PCP: Lurdes Navarro    HPI:  This is a 73 y.o. female with PMH noted below, here for follow up for elevated liver enzymes. She was last seen in clinic by myself in 10/2023.    The patient's risk factors for fatty liver disease include:     Obesity                                        Yes; BMI 34.97  Dyslipidemia                                Yes; On Atorvastatin 80 mg daily - Refer to most recent lipid panel results below:   Latest Reference Range & Units 08/21/23 00:00   Cholesterol Total 0 - 199 mg/dL 147   HDL mg/dL 42   HDL/Cholesterol Ratio 0 - 4.5  3.5   LDL Cholesterol External 0 - 99 mg/dL 72   Triglycerides 0 - 149 mg/dL 166 (H)   VLDL 10 - 30 mg/dL 33 (H)     Insulin resistance/Diabetes         Yes; Last HgbA1c was 5.0% (10/2023)  Family history of diabetes           Yes    PMH is  significant for ESRD, now on HD twice per week (hospitalized in August for volume overload hyperphosphatemia). Her liver enzymes were essentially normal prior to hospitalization. Refer to recent LFT trend as below:      Latest Reference Range & Units 09/26/23 11:02 10/06/23 00:00 10/10/23 10:01 10/31/23 12:25 11/03/23 00:00 11/14/23 11:18   ALP 55 - 135 U/L 269 (H) 154 (H) 282 (H) 384 (H) 279 (H) 247 (H)   Alkaline Phosphatase, Total 37 - 153 U/L      234 (H)   PROTEIN TOTAL 6.0 - 8.4 g/dL 6.2  5.6 (L) 6.6  7.0   Albumin 3.5 - 5.2 g/dL 2.9 (L)  2.6 (L) 3.0 (L)  3.2 (L)   Albumin 3.5 - 5.2 g/dL  3.8   3.5    BILIRUBIN TOTAL 0.1 - 1.0 mg/dL 0.3  0.2 0.3  0.3   Bilirubin Direct 0.1 - 0.3 mg/dL    0.1  0.1   AST 10 - 40 U/L 26  242 (H) 137 (H)  27   ALT 10 - 44 U/L 44 11 144 (H) 130 (H) 57 (H) 29     She has not undergone any recent abdominal imaging. Prior US of the liver in 9/2021 showed:     FINDINGS:    Liver: Normal in size, measuring 16.8 cm. Fatty replaced no focal hepatic lesions.     Gallbladder: Patient is status post cholecystectomy.     Biliary system: The common duct is not dilated, measuring 11 mm.  No intrahepatic ductal dilatation.     Spleen: Normal in size and echotexture, measuring 11.4 x 3.4 cm.     Miscellaneous: No upper abdominal ascites.     Impression:     Fatty replacement of the liver.     Postsurgical dilatation of the common bile duct.     No other remarkable findings are appreciated.    She has never undergone a liver biopsy or non-invasive staging exam.    FIB-4 Calculation: 6.48 at 10/31/2023 12:00 PM   Calculated from:  Last SGOT/AST : 27 at 1/2/2024  4:04 PM  Last SGPT/ALT: 144 at 10/31/2023 12:00 PM   Platelets: 252 at 1/2/2024  4:04 PM   Age: 73 y.o.     FIB-4 below 1.30 is considered as low-risk for advanced fibrosis  FIB-4 over 2.67 is considered as high-risk for advanced fibrosis  FIB-4 values between 1.30 and 2.67 are considered as intermediate-risk of advanced fibrosis for ages  36-64.     For ages > 64 the cut-off for low-risk goes to < 2.  This is a screening tool and clinical judgement should be used in the interpretation of these results.    She has no known family history of liver disease. She denies any history of heavy alcohol use or illicit drug use. Viral hepatitis testing was negative in August. She has received 2 vaccinations for Hepatitis B, through dialysis center. Serological work up for other causes of chronic liver disease was negative/normal for Rashad Disease, Alpha-1 antitrypsin deficiency, and autoimmune etiology. Positive JASON was noted with a titer of 1:640, with negative reflex testing and normal IgG (JASON was previously negative in 2010 and 2014). HSV IgM was noted to be positive, but Herpes simplex viral load was negative. Repeat LFT's in 12/2023 were essentially normal, and reassuringly, she has no signs or symptoms of hepatic decompensation including jaundice, dark urine, pruritus, abdominal distention, lower extremity edema, hematemesis, melena, or periods of confusion suggestive of hepatic encephalopathy.      PMHX:  has a past medical history of Cervicogenic migraine, Chronic pain, CKD (chronic kidney disease) stage 4, GFR 15-29 ml/min, CKD (chronic kidney disease) stage 4, GFR 15-29 ml/min, Diabetes mellitus, Dialysis patient (1/21/2022), Fibromyalgia, Hydronephrosis, Hyperlipidemia, Hypertension (12/12/2012), Hypothyroidism (12/12/2012), ARON (iron deficiency anemia), Insomnia, Levoscoliosis, Malignant neoplasm of upper-outer quadrant of left breast in female, estrogen receptor positive, Metabolic acidosis, Mobility impaired, Nuclear sclerosis - Both Eyes (3/24/2014), VIJAYA (obstructive sleep apnea), Osteopenia, Pulmonary nodule, Recurrent UTI, Renal manifestation of secondary diabetes mellitus, Secondary hyperparathyroidism, renal, and Urinary retention.    PSHX:  has a past surgical history that includes Total abdominal hysterectomy w/ bilateral  "salpingoophorectomy (1985); Gallbladder surgery (2006); TONSILLECTOMY, ADENOIDECTOMY; Ovarian cyst surgery (1985); Dilation and curettage of uterus; Cholecystectomy; spt placement; Breast biopsy (Right); Colonoscopy (N/A, 1/13/2017); Cystoscopy (N/A, 10/8/2018); Injection of botulinum toxin type A (N/A, 10/8/2018); Cystoscopy (N/A, 3/25/2019); Injection of botulinum toxin type A (N/A, 3/25/2019); Hysterectomy; Mastectomy (Left, 8/1/2019); Injection for sentinel node identification (Left, 8/1/2019); [place] lymph node biopsy (Left, 8/1/2019); Cystoscopy (N/A, 8/26/2019); Injection of botulinum toxin type A (N/A, 8/26/2019); Cystoscopy with biopsy of bladder (N/A, 1/27/2020); Cystoscopy w/ ureteral stent placement (Left, 5/15/2021); Cystoscopy (N/A, 7/2/2021); Cystoscopy (12/23/2021); Replacement of stent (Left, 12/23/2021); Cystoscopy (2/18/2022); Retrograde pyelography (Left, 2/18/2022); Ureteroscopy (Left, 2/18/2022); Replacement of stent (Left, 2/18/2022); Biopsy of ureter (Left, 2/18/2022); Mediport removal (Right, 6/24/2022); and Colonoscopy (N/A, 12/7/2023).    The patient's social and family histories were reviewed by me and updated in the appropriate section of the electronic medical record.    Review of patient's allergies indicates:  No Known Allergies    Current Outpatient Medications on File Prior to Visit   Medication Sig Dispense Refill    amLODIPine (NORVASC) 5 MG tablet Take 5 mg by mouth once daily.      anastrozole (ARIMIDEX) 1 mg Tab Take 1 tablet (1 mg total) by mouth once daily. 90 tablet 2    apixaban (ELIQUIS) 5 mg Tab Take 1 tablet (5 mg total) by mouth 2 (two) times daily. 180 tablet 3    apixaban (ELIQUIS) 5 mg Tab Take 1 tablet by mouth 2 (two) times daily.      atorvastatin (LIPITOR) 80 MG tablet Take 1 tablet (80 mg total) by mouth once daily. 90 tablet 3    BD INSULIN SYRINGE ULTRA-FINE 0.5 mL 31 gauge x 5/16" Syrg USE WITH INSULIN 4 TIMES A  each 12    calcium acetate 667 mg Tab " 1 capsule 3 TIMES DAILY (route: oral)      calcium acetate,phosphat bind, (PHOSLO) 667 mg capsule Take by mouth 3 (three) times daily.      calcium acetate,phosphat bind, (PHOSLO) 667 mg tablet Take 1 tablet (667 mg total) by mouth 3 (three) times daily with meals. 90 tablet 11    carvediloL (COREG) 6.25 MG tablet Take 1 tablet (6.25 mg total) by mouth 2 (two) times daily. 60 tablet 11    DULoxetine (CYMBALTA) 20 MG capsule Take 20 mg by mouth 2 (two) times daily.      erenumab-aooe (AIMOVIG AUTOINJECTOR) 140 mg/mL autoinjector Inject 1 mL (140 mg total) into the skin every 30 days. No further refills without appointment 1 mL 2    erenumab-aooe (AIMOVIG AUTOINJECTOR) 140 mg/mL autoinjector 140 mg MONTHLY (route: subcutaneous)      ergocalciferol (ERGOCALCIFEROL) 50,000 unit Cap TAKE ONE CAPSULE BY MOUTH ONE TIME PER WEEK, TAKES ON TUESDAYS 12 capsule 3    furosemide (LASIX) 40 MG tablet Take 1 tablet (40 mg total) by mouth once daily. 30 tablet 11    furosemide (LASIX) 40 MG tablet Take 1 tablet by mouth once daily.      GAVILYTE-C 240-22.72-6.72 -5.84 gram SolR Take 4,000 mLs by mouth once.      HYDROcodone-acetaminophen (NORCO)  mg per tablet Take 1 tablet by mouth every 6 (six) hours as needed for Pain. 120 tablet 0    insulin (LANTUS SOLOSTAR U-100 INSULIN) glargine 100 units/mL SubQ pen INJECT 14-15 UNITS UNDER THE SKIN ONCE DAILY (Patient taking differently: INJECT 14-16 UNITS UNDER THE SKIN ONCE DAILY) 15 each 3    isosorbide mononitrate (IMDUR) 30 MG 24 hr tablet Take 1 tablet (30 mg total) by mouth once daily. 30 tablet 11    isosorbide mononitrate (IMDUR) 30 MG 24 hr tablet Take 1 tablet by mouth once daily.      magnesium oxide (MAG-OX) 400 mg (241.3 mg magnesium) tablet Take 1 tablet (400 mg total) by mouth once daily. 30 tablet 2    magnesium oxide (MAGOX) 400 mg (241.3 mg magnesium) tablet Take 1 tablet by mouth once daily.      methocarbamoL (ROBAXIN) 750 MG Tab TAKE 1-2 TABLETS (750-1,500 MG  "TOTAL) BY MOUTH 3 (THREE) TIMES DAILY AS NEEDED (MUSCLE SPASMS). 180 tablet 1    NIFEdipine (ADALAT CC) 90 MG TbSR Take 1 tablet by mouth once daily.      NIFEdipine (PROCARDIA-XL) 90 MG (OSM) 24 hr tablet Take 1 tablet (90 mg total) by mouth once daily. 30 tablet 11    oxybutynin (DITROPAN-XL) 10 MG 24 hr tablet TAKE 1 TABLET BY MOUTH EVERY DAY 90 tablet 4    pen needle, diabetic (BD ULTRA-FINE SHORT PEN NEEDLE) 31 gauge x 5/16" Ndle USE WITH LANTUS DAILY, e 11.65 100 each 3    sumatriptan (IMITREX) 100 MG tablet Take 1 tablet (100 mg total) by mouth 2 (two) times daily as needed for Migraine. 30 tablet 1    SYNTHROID 50 mcg tablet TAKE 1 TABLET BY MOUTH BEFORE BREAKFAST. ADMINISTER ON AN EMPTY STOMACH AT LEAST 30 MINUTES BEFORE FOOD. IF RECEIVING TUBE FEEDS, HOLD TUBE FEEDS FOR 1 HOUR BEFORE AND AFTER LEVOTHYROXINE ADMINISTRATION. 90 tablet 2    traZODone (DESYREL) 100 MG tablet TAKE 1 TABLET BY MOUTH NIGHTLY AS NEEDED FOR INSOMNIA. 90 tablet 2     Current Facility-Administered Medications on File Prior to Visit   Medication Dose Route Frequency Provider Last Rate Last Admin    heparin (porcine) injection 2,000 Units  2,000 Units Intravenous Once Emmanuel Hare Jr., MD           SOCIAL HISTORY:   Social History     Tobacco Use   Smoking Status Never   Smokeless Tobacco Never       Social History     Substance and Sexual Activity   Alcohol Use No    Alcohol/week: 0.0 standard drinks of alcohol       Social History     Substance and Sexual Activity   Drug Use No       ROS:   GENERAL: Denies fever, chills, weight loss/gain, fatigue  HEENT: Denies headaches, dizziness, vision/hearing changes  CARDIOVASCULAR: Denies chest pain, palpitations, or edema  RESPIRATORY: Denies dyspnea, cough  GI: Denies abdominal pain, rectal bleeding, nausea, vomiting. No change in bowel pattern or color  : Denies dysuria, hematuria  SKIN: Denies rash, itching   NEURO: Denies confusion, memory loss, or mood changes  PSYCH: Denies " depression or anxiety  HEME/LYMPH: Denies easy bruising or bleeding    Objective Findings:    PHYSICAL EXAM:   Friendly White female, in no acute distress; alert and oriented to person, place and time.  VITALS: There were no vitals taken for this visit. (Not done - Telehealth visit).   HENT: Normocephalic, without obvious abnormality.   EYES: Sclerae anicteric.   NECK: No obvious masses.  CARDIOVASCULAR: + Peripheral edema. R > L  RESPIRATORY: Normal respiratory effort.   GI: Soft obese abdomen.  EXTREMITIES: + Peripheral edema.  SKIN: No jaundice. No rashes noted to exposed skin.   NEURO: No asterixis.   PSYCH:  Memory intact. Thought and speech pattern appropriate. Behavior normal. No depression or anxiety noted.    DIAGNOSTIC STUDIES:    US ABDOMEN LIMITED 9/6/2021:    FINDINGS:    Liver: Normal in size, measuring 16.8 cm. Fatty replaced no focal hepatic lesions.     Gallbladder: Patient is status post cholecystectomy.     Biliary system: The common duct is not dilated, measuring 11 mm.  No intrahepatic ductal dilatation.     Spleen: Normal in size and echotexture, measuring 11.4 x 3.4 cm.     Miscellaneous: No upper abdominal ascites.     Impression:     Fatty replacement of the liver.     Postsurgical dilatation of the common bile duct.     No other remarkable findings are appreciated.    ABD. U/S - Ordered at visit.        ASSESSMENT & PLAN:  73 y.o. White female with:    1. Elevated alkaline phosphatase level  US Elastography Liver w/imaging    Hepatic Function Panel    JASON Screen w/Reflex    IgG      2. ESRD (end stage renal disease) on dialysis  US Elastography Liver w/imaging    Hepatic Function Panel    JASON Screen w/Reflex    IgG      3. Obesity, diabetes, and hypertension syndrome        4. Positive JASON (antinuclear antibody)  US Elastography Liver w/imaging    Hepatic Function Panel    JASON Screen w/Reflex    IgG        - Schedule Ultrasound of the liver with Elastography now at [facility].  - Repeat  liver function tests now. Will also recheck JASON.  - Continue with prophylactic vaccination series for Hepatitis B, as planned.  - Continue close follow up with Nephrology, along with dialysis appointments as scheduled.  - Recommend good control of cholesterol, blood pressure, & blood sugar levels.  - Avoid alcohol and herbal supplements/alternative remedies.  - Return to clinic to be determined by lab and ultrasound results.    Thank you for allowing me to participate in the care of Cecile Bowen       Hepatology Nurse Practitioner  [facility]    CC'ed note to:   No ref. provider found  Lurdes Navarro MD

## 2024-01-02 NOTE — Clinical Note
Please contact patient to schedule labs and US with Elastography here at main campus in mid January. Orders in Epic. Thank you.

## 2024-01-03 ENCOUNTER — PATIENT MESSAGE (OUTPATIENT)
Dept: UROLOGY | Facility: CLINIC | Age: 74
End: 2024-01-03
Payer: MEDICARE

## 2024-01-03 ENCOUNTER — TELEPHONE (OUTPATIENT)
Dept: VASCULAR SURGERY | Facility: CLINIC | Age: 74
End: 2024-01-03
Payer: MEDICARE

## 2024-01-03 ENCOUNTER — PATIENT MESSAGE (OUTPATIENT)
Dept: NEPHROLOGY | Facility: CLINIC | Age: 74
End: 2024-01-03
Payer: MEDICARE

## 2024-01-03 DIAGNOSIS — Z01.818 PRE-OP EVALUATION: Primary | ICD-10-CM

## 2024-01-03 PROBLEM — R76.8 POSITIVE ANA (ANTINUCLEAR ANTIBODY): Status: ACTIVE | Noted: 2024-01-03

## 2024-01-03 NOTE — TELEPHONE ENCOUNTER
Contacted pt to schedule appt with Dr. Friedman for new HD access creation. Appointment scheduled, pt verified. Sooner appts available, however pt preferred afternoon appt at approx 1300. Appointment letter placed in mail.

## 2024-01-03 NOTE — PATIENT INSTRUCTIONS
- Schedule Ultrasound of the liver with Elastography now at main campus.  - Repeat liver function tests now. Will also recheck JASON.  - Continue with prophylactic vaccination series for Hepatitis B, as planned.  - Continue close follow up with Nephrology, along with dialysis appointments as scheduled.  - Recommend good control of cholesterol, blood pressure, & blood sugar levels.  - Avoid alcohol and herbal supplements/alternative remedies.  - Return to clinic to be determined by lab and ultrasound results.

## 2024-01-04 ENCOUNTER — TELEPHONE (OUTPATIENT)
Dept: HEPATOLOGY | Facility: CLINIC | Age: 74
End: 2024-01-04
Payer: MEDICARE

## 2024-01-04 RX ORDER — LEVOTHYROXINE SODIUM 50 UG/1
TABLET ORAL
Qty: 90 TABLET | Refills: 2 | Status: SHIPPED | OUTPATIENT
Start: 2024-01-04

## 2024-01-04 NOTE — TELEPHONE ENCOUNTER
----- Message from Maci Blue NP sent at 1/3/2024 12:04 PM CST -----  Please contact patient to schedule labs and US with Elastography here at main campus in mid January. Orders in Epic. Thank you.

## 2024-01-10 ENCOUNTER — PATIENT MESSAGE (OUTPATIENT)
Dept: PHYSICAL MEDICINE AND REHAB | Facility: CLINIC | Age: 74
End: 2024-01-10
Payer: MEDICARE

## 2024-01-11 ENCOUNTER — OFFICE VISIT (OUTPATIENT)
Dept: UROLOGY | Facility: CLINIC | Age: 74
End: 2024-01-11
Payer: MEDICARE

## 2024-01-11 VITALS — DIASTOLIC BLOOD PRESSURE: 88 MMHG | HEART RATE: 74 BPM | SYSTOLIC BLOOD PRESSURE: 183 MMHG

## 2024-01-11 DIAGNOSIS — Z74.09 IMMOBILITY: ICD-10-CM

## 2024-01-11 DIAGNOSIS — Z43.5 ENCOUNTER FOR CARE OR REPLACEMENT OF SUPRAPUBIC TUBE: ICD-10-CM

## 2024-01-11 DIAGNOSIS — R33.9 URINARY RETENTION: ICD-10-CM

## 2024-01-11 DIAGNOSIS — Z93.59 CHRONIC SUPRAPUBIC CATHETER: ICD-10-CM

## 2024-01-11 DIAGNOSIS — N31.9 NEUROGENIC BLADDER: Primary | ICD-10-CM

## 2024-01-11 PROCEDURE — 51705 CHANGE OF BLADDER TUBE: CPT | Mod: HCNC,S$GLB,, | Performed by: NURSE PRACTITIONER

## 2024-01-11 PROCEDURE — 99999 PR PBB SHADOW E&M-EST. PATIENT-LVL IV: CPT | Mod: PBBFAC,HCNC,, | Performed by: NURSE PRACTITIONER

## 2024-01-11 PROCEDURE — 3077F SYST BP >= 140 MM HG: CPT | Mod: HCNC,CPTII,S$GLB, | Performed by: NURSE PRACTITIONER

## 2024-01-11 PROCEDURE — 1159F MED LIST DOCD IN RCRD: CPT | Mod: HCNC,CPTII,S$GLB, | Performed by: NURSE PRACTITIONER

## 2024-01-11 PROCEDURE — 3079F DIAST BP 80-89 MM HG: CPT | Mod: HCNC,CPTII,S$GLB, | Performed by: NURSE PRACTITIONER

## 2024-01-11 PROCEDURE — 99214 OFFICE O/P EST MOD 30 MIN: CPT | Mod: HCNC,25,S$GLB, | Performed by: NURSE PRACTITIONER

## 2024-01-11 PROCEDURE — 3066F NEPHROPATHY DOC TX: CPT | Mod: HCNC,CPTII,S$GLB, | Performed by: NURSE PRACTITIONER

## 2024-01-11 PROCEDURE — 1160F RVW MEDS BY RX/DR IN RCRD: CPT | Mod: HCNC,CPTII,S$GLB, | Performed by: NURSE PRACTITIONER

## 2024-01-11 PROCEDURE — 1126F AMNT PAIN NOTED NONE PRSNT: CPT | Mod: HCNC,CPTII,S$GLB, | Performed by: NURSE PRACTITIONER

## 2024-01-11 NOTE — PROGRESS NOTES
CHIEF COMPLAINT:    Cecile Bowen is a 73 y.o. female presents today for Neurogenic Bladder.     HISTORY OF PRESENTING ILLINESS:    Cecile Bowen is a 73 y.o. female established with urology. She presents today for monthly SPT change. She is a diabetic female with history of bilateral hydronephrosis, incomplete bladder emptying which is managed by SPT (16fr).     Last SPT change was 2023.     She has been having SPT changes with [facility], insurance has stopped covering this service. She has an apt scheduled for monthly SPT changes next week with JIMMY Ken NP. She has had SPT in place for about 8 years. States she does not get recurrent UTIs.     S/p Cystoscopy with bladder botox injection (300u) 2018 with Dr. Whittington.     2021 found to have a left proximal ureteral stone, ZAK, and UTI; stent was place.  2021 cysto and left stent exchange; unable to have laser litho due to bacteremia.  2021 cysto with Left stent exchange.  2022 Left URS, cysto, stent exchange (now with strings). Ureteral bx; SPT exchange.    2023 cysto for replacement of SPT. HH had trouble; only able to place a 12 fr.   Dr. Whittington up sized to 16fr and irrigated debris from bladder.       She is here today for SPT exchange.   [facility] had been seeing her but HH orders have  then she was no longer considered a candidate.  Would like for them to continue the service  She is unable to walk, drive, or use her motorized scooter.  Due to her condition at high risk for infections and falls.     She has been diagnosed with breast cancer; s/p Mastectomy 2019  Malignant neoplasm of upper-outer quadrant of left breast in female, estrogen receptor positive   No need for chemo/radiation therapy.           REVIEW OF SYSTEMS:  Review of Systems   Constitutional: Negative.  Negative for chills and fever.   Eyes:  Negative for double vision.   Respiratory:  Negative for cough and  shortness of breath.    Cardiovascular:  Negative for chest pain.   Gastrointestinal:  Negative for abdominal pain, constipation, diarrhea, nausea and vomiting.   Genitourinary: Negative.  Negative for dysuria, flank pain and hematuria.        SPT draining well     Musculoskeletal:         Scooter for transportation     Neurological:  Positive for weakness. Negative for dizziness and seizures.   Endo/Heme/Allergies:  Negative for polydipsia.         PATIENT HISTORY:    Past Medical History:   Diagnosis Date    Cervicogenic migraine     Chronic pain     CKD (chronic kidney disease) stage 4, GFR 15-29 ml/min     [facility]    CKD (chronic kidney disease) stage 4, GFR 15-29 ml/min     Diabetes mellitus     Long term use of Insulin, Diabetic Neuropathy    Dialysis patient 1/21/2022    Fibromyalgia     Hydronephrosis     Hyperlipidemia     Hypertension 12/12/2012    Hypothyroidism 12/12/2012    ARON (iron deficiency anemia)     Insomnia     Levoscoliosis     Malignant neoplasm of upper-outer quadrant of left breast in female, estrogen receptor positive     Metabolic acidosis     Mobility impaired     Nuclear sclerosis - Both Eyes 3/24/2014    VIJAYA (obstructive sleep apnea)     Osteopenia     Pulmonary nodule     Recurrent UTI     Renal manifestation of secondary diabetes mellitus     Secondary hyperparathyroidism, renal     Urinary retention        Past Surgical History:   Procedure Laterality Date    BIOPSY OF URETER Left 2/18/2022    Procedure: BIOPSY, URETER;  Surgeon: Ronel Whittington MD;  Location: [facility] OR [address] [place] [address];  Service: Urology;  Laterality: Left;    BREAST BIOPSY Right     benign    CHOLECYSTECTOMY      COLONOSCOPY N/A 1/13/2017    Procedure: COLONOSCOPY;  Surgeon: Morris Wiseman MD;  Location: [facility] (4TH FLR);  Service: Endoscopy;  Laterality: N/A;  Renal pt Nephrology advised to avoid phosphate preps    COLONOSCOPY N/A 12/7/2023    Procedure: COLONOSCOPY;  Surgeon: Herve Allen MD;   Location: [facility] ENDO (2ND FLR);  Service: Endoscopy;  Laterality: N/A;  HD MWF; labs prior  suprapubic catheter  pt does not ambulate-uses christina lift- will have sling with her  9/7 ref. by Anastasiia Campbell DO, RIKKI, instr. mailed, Eliquis hold approval pending-st  ok to hold Eliquis 2 days per Dr Navarro-GT  1/30-precall complete-MS    CYSTOSCOPY N/A 10/8/2018    Procedure: CYSTOSCOPY;  Surgeon: Ronel Whittington MD;  Location: [facility] OR 1ST FLR;  Service: Urology;  Laterality: N/A;  45 min    CYSTOSCOPY N/A 3/25/2019    Procedure: CYSTOSCOPY;  Surgeon: Ronel Whittington MD;  Location: [facility] OR [facility] F[facility];  Service: Urology;  Laterality: N/A;  45 min    CYSTOSCOPY N/A 8/26/2019    Procedure: CYSTOSCOPY;  Surgeon: Ronel Whittington MD;  [facility];  Service: Urology;  Laterality: N/A;  45 min    CYSTOSCOPY N/A 7/2/2021    Procedure: CYSTOSCOPY;  Surgeon: Ronel Whittington MD;  [facility];  Service: Urology;  Laterality: N/A;    CYSTOSCOPY  12/23/2021    Procedure: CYSTOSCOPY;  Surgeon: Ronel Whittington MD;  [facility];  Service: Urology;;    CYSTOSCOPY  2/18/2022    Procedure: CYSTOSCOPY;  Surgeon: Ronel Whittington MD;  [facility];  Service: Urology;;    CYSTOSCOPY W/ URETERAL STENT PLACEMENT Left 5/15/2021    Procedure: CYSTOSCOPY, WITH URETERAL STENT INSERTION;  Surgeon: Levy Sánchez Jr., MD;  [facility];  Service: Urology;  Laterality: Left;    CYSTOSCOPY WITH BIOPSY OF BLADDER N/A 1/27/2020    Procedure: CYSTOSCOPY, WITH BLADDER BIOPSY, WITH FULGURATION IF INDICATED;  Surgeon: Ronel Whittington MD;  [facility];  Service: Urology;  Laterality: N/A;    DILATION AND CURETTAGE OF UTERUS      GALLBLADDER SURGERY  2006    HYSTERECTOMY      INJECTION FOR SENTINEL NODE IDENTIFICATION Left 8/1/2019    Procedure: INJECTION, FOR SENTINEL NODE IDENTIFICATION;  Surgeon: Samia Fulton MD;  Location:  [facility] OR 2ND FLR;  Service: General;  Laterality: Left;    INJECTION OF BOTULINUM TOXIN TYPE A N/A 10/8/2018    Procedure: INJECTION, BOTULINUM TOXIN, TYPE A 300 UNITS;  Surgeon: Ronel Whittington MD;  [facility];  Service: Urology;  Laterality: N/A;    INJECTION OF BOTULINUM TOXIN TYPE A N/A 3/25/2019    Procedure: INJECTION, BOTULINUM TOXIN, TYPE A 300 UNITS;  Surgeon: Ronel Whittington MD;  Location: [facility] OR [address] [place] [address];  Service: Urology;  Laterality: N/A;    INJECTION OF BOTULINUM TOXIN TYPE A N/A 8/26/2019    Procedure: INJECTION, BOTULINUM TOXIN, TYPE A 300 UNITS;  Surgeon: Ronel Whittnigton MD;  Location: [facility] OR [address] [place] [address];  Service: Urology;  Laterality: N/A;    MASTECTOMY Left 8/1/2019    Procedure: MASTECTOMY 23 hour stay;  Surgeon: Samia Fulton MD;  Location: [facility] OR [address] [place] [address];  Service: General;  Laterality: Left;    MEDIPORT REMOVAL Right 6/24/2022    Procedure: REMOVAL, CATHETER, CENTRAL VENOUS, TUNNELED, WITH PORT;  Surgeon: Isauro Anderson MD;  Location: [facility] CATH LAB;  Service: Radiology;  Laterality: Right;    OVARIAN CYST SURGERY  1985    REPLACEMENT OF STENT Left 12/23/2021    Procedure: REPLACEMENT, STENT;  Surgeon: Ronel Whittington MD;  Location: [address];  Service: Urology;  Laterality: Left;    REPLACEMENT OF STENT Left 2/18/2022    Procedure: REPLACEMENT, STENT;  Surgeon: Ronel Whittington MD;  Location: [address];  Service: Urology;  Laterality: Left;    RETROGRADE PYELOGRAPHY Left 2/18/2022    Procedure: PYELOGRAM, RETROGRADE;  Surgeon: Ronel Whittington MD;  Location: [facility] OR [address] [place] [address];  Service: Urology;  Laterality: Left;    SENTINEL LYMPH NODE BIOPSY Left 8/1/2019    Procedure: BIOPSY, LYMPH NODE, SENTINEL;  Surgeon: Samia Fulton MD;  Location: [facility] OR [address] [place] [address];  Service: General;  Laterality: Left;    spt placement      TONSILLECTOMY, ADENOIDECTOMY      TOTAL ABDOMINAL HYSTERECTOMY W/ BILATERAL SALPINGOOPHORECTOMY  1985     URETEROSCOPY Left 2/18/2022    Procedure: URETEROSCOPY;  Surgeon: Ronel Whittington MD;  Location: [facility] OR [address] [place] [address];  Service: Urology;  Laterality: Left;       Family History   Problem Relation Age of Onset    Diabetes Sister     Kidney disease Sister         CKD III    ALS Mother         d.    Cancer Maternal Grandmother         d. colon    Cancer Paternal Grandfather         d. lung    Scoliosis Brother         increased pain    Prostate cancer Brother         cured s/p surgery    Cancer Brother         rare cancer that got into the bones    Diabetes Maternal Aunt     Kidney disease Maternal Aunt     Diabetes Maternal Uncle     Amblyopia Neg Hx     Blindness Neg Hx     Cataracts Neg Hx     Glaucoma Neg Hx     Macular degeneration Neg Hx     Retinal detachment Neg Hx     Strabismus Neg Hx        Social History     Socioeconomic History    Marital status:     Number of children: 0    Highest education level: Master's degree (e.g., MA, MS, Lelo, MEd, MSW, BANDAR)   Occupational History    Occupation: teacher     Comment: retired   Tobacco Use    Smoking status: Never    Smokeless tobacco: Never   Substance and Sexual Activity    Alcohol use: No     Alcohol/week: 0.0 standard drinks of alcohol    Drug use: No    Sexual activity: Not Currently     Birth control/protection: Abstinence   Social History Narrative    Single ()             Brother passed away last year.  Pt lives her her sister. (5/27)     Social Determinants of Health     Financial Resource Strain: Medium Risk (12/28/2023)    Overall Financial Resource Strain (CARDIA)     Difficulty of Paying Living Expenses: Somewhat hard   Food Insecurity: No Food Insecurity (12/28/2023)    Hunger Vital Sign     Worried About Running Out of Food in the Last Year: Never true     Ran Out of Food in the Last Year: Never true   Transportation Needs: Unmet Transportation Needs (12/28/2023)    PRAPARE - Transportation     Lack of Transportation (Medical): Yes  "    Lack of Transportation (Non-Medical): Yes   Physical Activity: Insufficiently Active (12/28/2023)    Exercise Vital Sign     Days of Exercise per Week: 3 days     Minutes of Exercise per Session: 20 min   Stress: No Stress Concern Present (12/28/2023)    [employer] [place] [employer] - Occupational Stress Questionnaire     Feeling of Stress : Not at all   Social Connections: Moderately Integrated (12/28/2023)    Social Connection and Isolation Panel [NHANES]     Frequency of Communication with Friends and Family: More than three times a week     Frequency of Social Gatherings with Friends and Family: Never     Attends Restoration Services: More than 4 times per year     Active Member of Clubs or Organizations: Yes     Attends Club or Organization Meetings: Never     Marital Status:    Housing Stability: Low Risk  (12/28/2023)    Housing Stability Vital Sign     Unable to Pay for Housing in the Last Year: No     Number of Places Lived in the Last Year: 1     Unstable Housing in the Last Year: No       Allergies:  Patient has no known allergies.    Medications:    Current Outpatient Medications:     amLODIPine (NORVASC) 5 MG tablet, Take 5 mg by mouth once daily., Disp: , Rfl:     anastrozole (ARIMIDEX) 1 mg Tab, Take 1 tablet (1 mg total) by mouth once daily., Disp: 90 tablet, Rfl: 2    apixaban (ELIQUIS) 5 mg Tab, Take 1 tablet (5 mg total) by mouth 2 (two) times daily., Disp: 180 tablet, Rfl: 3    apixaban (ELIQUIS) 5 mg Tab, Take 1 tablet by mouth 2 (two) times daily., Disp: , Rfl:     atorvastatin (LIPITOR) 80 MG tablet, Take 1 tablet (80 mg total) by mouth once daily., Disp: 90 tablet, Rfl: 3    BD INSULIN SYRINGE ULTRA-FINE 0.5 mL 31 gauge x 5/16" Syrg, USE WITH INSULIN 4 TIMES A DAY, Disp: 100 each, Rfl: 12    calcium acetate 667 mg Tab, 1 capsule 3 TIMES DAILY (route: oral), Disp: , Rfl:     calcium acetate,phosphat bind, (PHOSLO) 667 mg capsule, Take by mouth 3 (three) times daily., " Disp: , Rfl:     calcium acetate,phosphat bind, (PHOSLO) 667 mg tablet, Take 1 tablet (667 mg total) by mouth 3 (three) times daily with meals., Disp: 90 tablet, Rfl: 11    carvediloL (COREG) 6.25 MG tablet, Take 1 tablet (6.25 mg total) by mouth 2 (two) times daily., Disp: 60 tablet, Rfl: 11    DULoxetine (CYMBALTA) 20 MG capsule, Take 20 mg by mouth 2 (two) times daily., Disp: , Rfl:     erenumab-aooe (AIMOVIG AUTOINJECTOR) 140 mg/mL autoinjector, Inject 1 mL (140 mg total) into the skin every 30 days. No further refills without appointment, Disp: 1 mL, Rfl: 2    erenumab-aooe (AIMOVIG AUTOINJECTOR) 140 mg/mL autoinjector, 140 mg MONTHLY (route: subcutaneous), Disp: , Rfl:     ergocalciferol (ERGOCALCIFEROL) 50,000 unit Cap, TAKE ONE CAPSULE BY MOUTH ONE TIME PER WEEK, TAKES ON TUESDAYS, Disp: 12 capsule, Rfl: 3    furosemide (LASIX) 40 MG tablet, Take 1 tablet (40 mg total) by mouth once daily., Disp: 30 tablet, Rfl: 11    furosemide (LASIX) 40 MG tablet, Take 1 tablet by mouth once daily., Disp: , Rfl:     GAVILYTE-C 240-22.72-6.72 -5.84 gram SolR, Take 4,000 mLs by mouth once., Disp: , Rfl:     HYDROcodone-acetaminophen (NORCO)  mg per tablet, Take 1 tablet by mouth every 6 (six) hours as needed for Pain., Disp: 120 tablet, Rfl: 0    insulin (LANTUS SOLOSTAR U-100 INSULIN) glargine 100 units/mL SubQ pen, INJECT 14-15 UNITS UNDER THE SKIN ONCE DAILY (Patient taking differently: INJECT 14-16 UNITS UNDER THE SKIN ONCE DAILY), Disp: 15 each, Rfl: 3    isosorbide mononitrate (IMDUR) 30 MG 24 hr tablet, Take 1 tablet (30 mg total) by mouth once daily., Disp: 30 tablet, Rfl: 11    isosorbide mononitrate (IMDUR) 30 MG 24 hr tablet, Take 1 tablet by mouth once daily., Disp: , Rfl:     magnesium oxide (MAG-OX) 400 mg (241.3 mg magnesium) tablet, Take 1 tablet (400 mg total) by mouth once daily., Disp: 30 tablet, Rfl: 2    magnesium oxide (MAGOX) 400 mg (241.3 mg magnesium) tablet, Take 1 tablet by mouth once  "daily., Disp: , Rfl:     methocarbamoL (ROBAXIN) 750 MG Tab, TAKE 1-2 TABLETS (750-1,500 MG TOTAL) BY MOUTH 3 (THREE) TIMES DAILY AS NEEDED (MUSCLE SPASMS)., Disp: 180 tablet, Rfl: 1    NIFEdipine (ADALAT CC) 90 MG TbSR, Take 1 tablet by mouth once daily., Disp: , Rfl:     NIFEdipine (PROCARDIA-XL) 90 MG (OSM) 24 hr tablet, Take 1 tablet (90 mg total) by mouth once daily., Disp: 30 tablet, Rfl: 11    oxybutynin (DITROPAN-XL) 10 MG 24 hr tablet, TAKE 1 TABLET BY MOUTH EVERY DAY, Disp: 90 tablet, Rfl: 4    pen needle, diabetic (BD ULTRA-FINE SHORT PEN NEEDLE) 31 gauge x 5/16" Ndle, USE WITH LANTUS DAILY, e 11.65, Disp: 100 each, Rfl: 3    SYNTHROID 50 mcg tablet, TAKE 1 TABLET BY MOUTH BEFORE BREAKFAST. ADMINISTER ON AN EMPTY STOMACH AT LEAST 30 MINUTES BEFORE FOOD. IF RECEIVING TUBE FEEDS, HOLD TUBE FEEDS FOR 1 HOUR BEFORE AND AFTER LEVOTHYROXINE ADMINISTRATION., Disp: 90 tablet, Rfl: 2    traZODone (DESYREL) 100 MG tablet, TAKE 1 TABLET BY MOUTH NIGHTLY AS NEEDED FOR INSOMNIA., Disp: 90 tablet, Rfl: 2    sumatriptan (IMITREX) 100 MG tablet, Take 1 tablet (100 mg total) by mouth 2 (two) times daily as needed for Migraine., Disp: 30 tablet, Rfl: 1  No current facility-administered medications for this visit.    Facility-Administered Medications Ordered in Other Visits:     heparin (porcine) injection 2,000 Units, 2,000 Units, Intravenous, Once, Emmanuel Hare Jr., MD    PHYSICAL EXAMINATION:  Physical Exam  Vitals reviewed.   Constitutional:       General: She is awake. She is not in acute distress.     Appearance: Normal appearance. She is well-developed. She is not toxic-appearing.   HENT:      Head: Normocephalic and atraumatic.      Right Ear: External ear normal.      Left Ear: External ear normal.      Nose: Nose normal.   Eyes:      General: Lids are normal.         Right eye: No discharge.         Left eye: No discharge.      Conjunctiva/sclera: Conjunctivae normal.   Neck:      Trachea: Trachea normal. "   Cardiovascular:      Rate and Rhythm: Normal rate.   Pulmonary:      Effort: Pulmonary effort is normal. No respiratory distress.   Abdominal:      General: There is no distension.      Palpations: Abdomen is soft.      Tenderness: There is no abdominal tenderness. There is no right CVA tenderness, left CVA tenderness, guarding or rebound.      Comments: SPT stoma looks good. No surrounding erythema or granulating tissue.     Musculoskeletal:      Cervical back: Normal range of motion and neck supple.   Skin:     General: Skin is warm and dry.   Neurological:      General: No focal deficit present.      Mental Status: She is alert and oriented to person, place, and time.   Psychiatric:         Speech: Speech normal.         Behavior: Behavior normal. Behavior is cooperative.         Thought Content: Thought content normal.         Judgment: Judgment normal.           LABS:            Lab Results   Component Value Date    CREATININE 3.09 (H) 01/05/2024    EGFRNORACEVR 24.7 (A) 10/10/2023             IMPRESSION:    Encounter Diagnoses   Name Primary?    Neurogenic bladder Yes    Chronic suprapubic catheter     Immobility     Urinary retention     Encounter for care or replacement of suprapubic tube          Assessment:       1. Neurogenic bladder    2. Chronic suprapubic catheter    3. Immobility    4. Urinary retention    5. Encounter for care or replacement of suprapubic tube        Plan:          I spent 30 minutes with the patient of which more than half was spent in direct consultation with the patient in regards to our treatment and plan.    Education and recommendations of today's plan of care including home remedies.  Old SPT easily removed.   Stoma prepped with betadine.   New 16fr SPT placed using sterile technique  Urine received   Irrigated the bladder to verify the position.  Balloon inflated 9cc sterile water.  Connected to drainage bag.  Tolerated well  RTC 4 weeks

## 2024-01-16 ENCOUNTER — TELEPHONE (OUTPATIENT)
Dept: NEUROLOGY | Facility: CLINIC | Age: 74
End: 2024-01-16
Payer: MEDICARE

## 2024-01-16 DIAGNOSIS — G43.711 INTRACTABLE CHRONIC MIGRAINE WITHOUT AURA AND WITH STATUS MIGRAINOSUS: ICD-10-CM

## 2024-01-16 RX ORDER — ERENUMAB-AOOE 140 MG/ML
140 INJECTION, SOLUTION SUBCUTANEOUS
Qty: 1 ML | Refills: 2 | Status: ON HOLD | OUTPATIENT
Start: 2024-01-16 | End: 2024-02-14 | Stop reason: HOSPADM

## 2024-01-16 NOTE — TELEPHONE ENCOUNTER
----- Message from Randy IBARRA Route sent at 1/16/2024  4:03 PM CST -----  Regarding: Refill  Contact: pt.  [phone]  Rx Refill/Request Is this a Refill or New Rx:  Refill    Rx Name and Strength:  HYDROcodone-acetaminophen (NORCO)  mg per tablet    Preferred Pharmacy with phone number:     [id number]  [address]  [facility] [facility] [place] [address]  Phone: [phone] Fax: [phone]      Communication Preference: [phone]  Additional Information:

## 2024-01-17 RX ORDER — HYDROCODONE BITARTRATE AND ACETAMINOPHEN 10; 325 MG/1; MG/1
1 TABLET ORAL EVERY 6 HOURS PRN
Qty: 120 TABLET | Refills: 0 | Status: SHIPPED | OUTPATIENT
Start: 2024-01-17 | End: 2024-02-12 | Stop reason: SDUPTHER

## 2024-01-18 ENCOUNTER — HOSPITAL ENCOUNTER (OUTPATIENT)
Dept: RADIOLOGY | Facility: HOSPITAL | Age: 74
Discharge: HOME | End: 2024-01-18
Attending: NURSE PRACTITIONER
Payer: MEDICARE

## 2024-01-18 DIAGNOSIS — Z99.2 ESRD (END STAGE RENAL DISEASE) ON DIALYSIS: ICD-10-CM

## 2024-01-18 DIAGNOSIS — R76.8 POSITIVE ANA (ANTINUCLEAR ANTIBODY): ICD-10-CM

## 2024-01-18 DIAGNOSIS — R74.8 ELEVATED ALKALINE PHOSPHATASE LEVEL: ICD-10-CM

## 2024-01-18 DIAGNOSIS — N18.6 ESRD (END STAGE RENAL DISEASE) ON DIALYSIS: ICD-10-CM

## 2024-01-18 PROCEDURE — 76981 USE PARENCHYMA: CPT | Mod: 26,HCNC,, | Performed by: RADIOLOGY

## 2024-01-18 PROCEDURE — 76981 USE PARENCHYMA: CPT | Mod: TC,HCNC

## 2024-01-20 DIAGNOSIS — C50.612 MALIGNANT NEOPLASM OF AXILLARY TAIL OF LEFT BREAST IN FEMALE, ESTROGEN RECEPTOR POSITIVE: Chronic | ICD-10-CM

## 2024-01-20 DIAGNOSIS — Z17.0 MALIGNANT NEOPLASM OF AXILLARY TAIL OF LEFT BREAST IN FEMALE, ESTROGEN RECEPTOR POSITIVE: Chronic | ICD-10-CM

## 2024-01-22 ENCOUNTER — PATIENT MESSAGE (OUTPATIENT)
Dept: HEPATOLOGY | Facility: CLINIC | Age: 74
End: 2024-01-22
Payer: MEDICARE

## 2024-01-22 ENCOUNTER — TELEPHONE (OUTPATIENT)
Dept: HEPATOLOGY | Facility: CLINIC | Age: 74
End: 2024-01-22
Payer: MEDICARE

## 2024-01-22 DIAGNOSIS — N18.6 STAGE 5 CHRONIC KIDNEY DISEASE ON CHRONIC DIALYSIS: ICD-10-CM

## 2024-01-22 DIAGNOSIS — R79.89 ELEVATED LFTS: ICD-10-CM

## 2024-01-22 DIAGNOSIS — Z79.4 TYPE 2 DIABETES MELLITUS WITH STAGE 4 CHRONIC KIDNEY DISEASE, WITH LONG-TERM CURRENT USE OF INSULIN: Chronic | ICD-10-CM

## 2024-01-22 DIAGNOSIS — K76.0 FATTY LIVER DISEASE, NONALCOHOLIC: ICD-10-CM

## 2024-01-22 DIAGNOSIS — N18.4 TYPE 2 DIABETES MELLITUS WITH STAGE 4 CHRONIC KIDNEY DISEASE, WITH LONG-TERM CURRENT USE OF INSULIN: Chronic | ICD-10-CM

## 2024-01-22 DIAGNOSIS — Z99.2 STAGE 5 CHRONIC KIDNEY DISEASE ON CHRONIC DIALYSIS: ICD-10-CM

## 2024-01-22 DIAGNOSIS — E66.09 CLASS 1 OBESITY DUE TO EXCESS CALORIES WITH SERIOUS COMORBIDITY AND BODY MASS INDEX (BMI) OF 34.0 TO 34.9 IN ADULT: ICD-10-CM

## 2024-01-22 DIAGNOSIS — K74.00 LIVER FIBROSIS: Primary | ICD-10-CM

## 2024-01-22 DIAGNOSIS — E11.22 TYPE 2 DIABETES MELLITUS WITH STAGE 4 CHRONIC KIDNEY DISEASE, WITH LONG-TERM CURRENT USE OF INSULIN: Chronic | ICD-10-CM

## 2024-01-22 RX ORDER — ANASTROZOLE 1 MG/1
1 TABLET ORAL
Qty: 90 TABLET | Refills: 2 | Status: SHIPPED | OUTPATIENT
Start: 2024-01-22

## 2024-01-22 NOTE — TELEPHONE ENCOUNTER
----- Message from Maci Blue NP sent at 1/22/2024 11:51 AM CST -----  Please contact patient to arrange f/u labs and Fibroscan in 3-6 months, with an in person clinic visit with me same day. Thank you.

## 2024-01-23 DIAGNOSIS — R76.8 POSITIVE ANA (ANTINUCLEAR ANTIBODY): ICD-10-CM

## 2024-01-23 DIAGNOSIS — R76.8 DS DNA ANTIBODY POSITIVE: Primary | ICD-10-CM

## 2024-01-26 ENCOUNTER — TELEPHONE (OUTPATIENT)
Dept: NEUROLOGY | Facility: CLINIC | Age: 74
End: 2024-01-26
Payer: MEDICARE

## 2024-01-26 NOTE — TELEPHONE ENCOUNTER
Called patient to make follow up appointment with Dr. Henao to review her Rheumatology results but she informed me that she was at dialysis and that would contact me back when she had time.

## 2024-01-30 ENCOUNTER — HOSPITAL ENCOUNTER (OUTPATIENT)
Dept: VASCULAR SURGERY | Facility: CLINIC | Age: 74
Discharge: HOME | End: 2024-01-30
Attending: SURGERY
Payer: MEDICARE

## 2024-01-30 ENCOUNTER — INITIAL CONSULT (OUTPATIENT)
Dept: VASCULAR SURGERY | Facility: CLINIC | Age: 74
End: 2024-01-30
Attending: SURGERY
Payer: MEDICARE

## 2024-01-30 ENCOUNTER — LAB VISIT (OUTPATIENT)
Dept: LAB | Facility: HOSPITAL | Age: 74
End: 2024-01-30
Attending: SURGERY
Payer: MEDICARE

## 2024-01-30 VITALS — SYSTOLIC BLOOD PRESSURE: 150 MMHG | HEART RATE: 86 BPM | TEMPERATURE: 98 F | DIASTOLIC BLOOD PRESSURE: 71 MMHG

## 2024-01-30 DIAGNOSIS — N18.6 ESRD (END STAGE RENAL DISEASE) ON DIALYSIS: Primary | ICD-10-CM

## 2024-01-30 DIAGNOSIS — M79.7 FIBROMYALGIA: ICD-10-CM

## 2024-01-30 DIAGNOSIS — Z99.2 ESRD (END STAGE RENAL DISEASE) ON DIALYSIS: Primary | ICD-10-CM

## 2024-01-30 DIAGNOSIS — M54.2 CHRONIC NECK PAIN: ICD-10-CM

## 2024-01-30 DIAGNOSIS — Z01.818 PRE-OP EVALUATION: ICD-10-CM

## 2024-01-30 DIAGNOSIS — G89.29 CHRONIC NECK PAIN: ICD-10-CM

## 2024-01-30 DIAGNOSIS — M54.50 CHRONIC MIDLINE LOW BACK PAIN WITHOUT SCIATICA: ICD-10-CM

## 2024-01-30 DIAGNOSIS — G89.29 CHRONIC MIDLINE LOW BACK PAIN WITHOUT SCIATICA: ICD-10-CM

## 2024-01-30 LAB
ANION GAP SERPL CALC-SCNC: 12 MMOL/L (ref 8–16)
BASOPHILS # BLD AUTO: 0.04 K/UL (ref 0–0.2)
BASOPHILS NFR BLD: 0.5 % (ref 0–1.9)
BUN SERPL-MCNC: 27 MG/DL (ref 8–23)
CALCIUM SERPL-MCNC: 9 MG/DL (ref 8.7–10.5)
CHLORIDE SERPL-SCNC: 100 MMOL/L (ref 95–110)
CO2 SERPL-SCNC: 21 MMOL/L (ref 23–29)
CREAT SERPL-MCNC: 2.8 MG/DL (ref 0.5–1.4)
DIFFERENTIAL METHOD BLD: ABNORMAL
EOSINOPHIL # BLD AUTO: 0.3 K/UL (ref 0–0.5)
EOSINOPHIL NFR BLD: 4.3 % (ref 0–8)
ERYTHROCYTE [DISTWIDTH] IN BLOOD BY AUTOMATED COUNT: 15.9 % (ref 11.5–14.5)
EST. GFR  (NO RACE VARIABLE): 17.5 ML/MIN/1.73 M^2
GLUCOSE SERPL-MCNC: 164 MG/DL (ref 70–110)
HCT VFR BLD AUTO: 34.7 % (ref 37–48.5)
HGB BLD-MCNC: 11.3 G/DL (ref 12–16)
IMM GRANULOCYTES # BLD AUTO: 0.04 K/UL (ref 0–0.04)
IMM GRANULOCYTES NFR BLD AUTO: 0.5 % (ref 0–0.5)
LYMPHOCYTES # BLD AUTO: 1.7 K/UL (ref 1–4.8)
LYMPHOCYTES NFR BLD: 23.8 % (ref 18–48)
MCH RBC QN AUTO: 29.8 PG (ref 27–31)
MCHC RBC AUTO-ENTMCNC: 32.6 G/DL (ref 32–36)
MCV RBC AUTO: 92 FL (ref 82–98)
MONOCYTES # BLD AUTO: 0.6 K/UL (ref 0.3–1)
MONOCYTES NFR BLD: 8.2 % (ref 4–15)
NEUTROPHILS # BLD AUTO: 4.6 K/UL (ref 1.8–7.7)
NEUTROPHILS NFR BLD: 62.7 % (ref 38–73)
NRBC BLD-RTO: 0 /100 WBC
PLATELET # BLD AUTO: 262 K/UL (ref 150–450)
PMV BLD AUTO: 9.1 FL (ref 9.2–12.9)
POTASSIUM SERPL-SCNC: 3.6 MMOL/L (ref 3.5–5.1)
RBC # BLD AUTO: 3.79 M/UL (ref 4–5.4)
SODIUM SERPL-SCNC: 133 MMOL/L (ref 136–145)
WBC # BLD AUTO: 7.28 K/UL (ref 3.9–12.7)

## 2024-01-30 PROCEDURE — 36415 COLL VENOUS BLD VENIPUNCTURE: CPT | Mod: HCNC | Performed by: SURGERY

## 2024-01-30 PROCEDURE — 93970 EXTREMITY STUDY: CPT | Mod: HCNC,S$GLB,, | Performed by: SURGERY

## 2024-01-30 PROCEDURE — 80048 BASIC METABOLIC PNL TOTAL CA: CPT | Mod: HCNC | Performed by: SURGERY

## 2024-01-30 PROCEDURE — 85025 COMPLETE CBC W/AUTO DIFF WBC: CPT | Mod: HCNC | Performed by: SURGERY

## 2024-01-30 PROCEDURE — 99999 PR PBB SHADOW E&M-EST. PATIENT-LVL IV: CPT | Mod: PBBFAC,HCNC,, | Performed by: SURGERY

## 2024-01-30 PROCEDURE — 99205 OFFICE O/P NEW HI 60 MIN: CPT | Mod: HCNC,S$GLB,, | Performed by: SURGERY

## 2024-01-30 NOTE — PROGRESS NOTES
VASCULAR SURGERY SERVICE    REFERRING DOCTOR: Lavinia Nieto NP     CHIEF COMPLAINT:  End-stage renal disease    HISTORY OF PRESENT ILLNESS: Cecile Bowen is a 73 y.o. female with prior simple mastectomy on the left, sentinel node biopsy, no axillary node dissection, with end-stage renal disease times 3 months via right IJ PermCath who is sent for permanent dialysis access.  She is currently dialyzing only Mondays and Fridays.    She is right-handed.  She has no history of AICD or pacemaker placement.  She does have known bilateral carpal tunnel syndrome with the occasional numbness in her hands but has not had a release.    She takes Eliquis 5 mg but only once a day because of easy bruising.  She is on clear why she is on Eliquis    Finally she takes chronic narcotics for back pain    Past Medical History:   Diagnosis Date    Cervicogenic migraine     Chronic pain     CKD (chronic kidney disease) stage 4, GFR 15-29 ml/min     [facility]    CKD (chronic kidney disease) stage 4, GFR 15-29 ml/min     Diabetes mellitus     Long term use of Insulin, Diabetic Neuropathy    Dialysis patient 1/21/2022    Fibromyalgia     Hydronephrosis     Hyperlipidemia     Hypertension 12/12/2012    Hypothyroidism 12/12/2012    ARON (iron deficiency anemia)     Insomnia     Levoscoliosis     Malignant neoplasm of upper-outer quadrant of left breast in female, estrogen receptor positive     Metabolic acidosis     Mobility impaired     Nuclear sclerosis - Both Eyes 3/24/2014    VIJAYA (obstructive sleep apnea)     Osteopenia     Pulmonary nodule     Recurrent UTI     Renal manifestation of secondary diabetes mellitus     Secondary hyperparathyroidism, renal     Urinary retention        Past Surgical History:   Procedure Laterality Date    BIOPSY OF URETER Left 2/18/2022    Procedure: BIOPSY, URETER;  Surgeon: Ronel Whittington MD;  Location: [facility] OR [address] [place] [address];  Service: Urology;  Laterality: Left;    BREAST BIOPSY Right     benign     CHOLECYSTECTOMY      COLONOSCOPY N/A 1/13/2017    Procedure: COLONOSCOPY;  Surgeon: Morris Wiseman MD;  Location: [facility] ENDO (4TH FLR);  Service: Endoscopy;  Laterality: N/A;  Renal pt Nephrology advised to avoid phosphate preps    COLONOSCOPY N/A 12/7/2023    Procedure: COLONOSCOPY;  Surgeon: Herve Allen MD;  Location: [facility] ENDO (2ND FLR);  Service: Endoscopy;  Laterality: N/A;  HD MWF; labs prior  suprapubic catheter  pt does not ambulate-uses christina lift- will have sling with her  9/7 ref. by Anastasiia Campbell, , PEG, instr. mailed, Eliquis hold approval pending-[facility] to hold Eliquis 2 days per Dr Navarro-GT  1/30-precall complete-MS    CYSTOSCOPY N/A 10/8/2018    Procedure: CYSTOSCOPY;  Surgeon: Ronel Whittington MD;  [facility];  Service: Urology;  Laterality: N/A;  45 min    CYSTOSCOPY N/A 3/25/2019    Procedure: CYSTOSCOPY;  Surgeon: Ronel Whittington MD;  [facility];  Service: Urology;  Laterality: N/A;  45 min    CYSTOSCOPY N/A 8/26/2019    Procedure: CYSTOSCOPY;  Surgeon: Ronel Whittington MD;  [facility];  Service: Urology;  Laterality: N/A;  45 min    CYSTOSCOPY N/A 7/2/2021    Procedure: CYSTOSCOPY;  Surgeon: Ronel Whittington MD;  [facility];  Service: Urology;  Laterality: N/A;    CYSTOSCOPY  12/23/2021    Procedure: CYSTOSCOPY;  Surgeon: Ronel Whittington MD;  [facility];  Service: Urology;;    CYSTOSCOPY  2/18/2022    Procedure: CYSTOSCOPY;  Surgeon: Ronel Whittington MD;  [facility];  Service: Urology;;    CYSTOSCOPY W/ URETERAL STENT PLACEMENT Left 5/15/2021    Procedure: CYSTOSCOPY, WITH URETERAL STENT INSERTION;  Surgeon: Levy Sánchez Jr., MD;  Location: [facility] OR [address] [place] [address];  Service: Urology;  Laterality: Left;    CYSTOSCOPY WITH BIOPSY OF BLADDER N/A 1/27/2020    Procedure: CYSTOSCOPY, WITH BLADDER BIOPSY, WITH FULGURATION IF INDICATED;  Surgeon: Ronel FRAZIER  MD Pk;  Location: [facility] OR [address] [place] [address];  Service: Urology;  Laterality: N/A;    DILATION AND CURETTAGE OF UTERUS      GALLBLADDER SURGERY  2006    HYSTERECTOMY      INJECTION FOR SENTINEL NODE IDENTIFICATION Left 8/1/2019    Procedure: INJECTION, FOR SENTINEL NODE IDENTIFICATION;  Surgeon: Samia Fulton MD;  Location: [facility] OR 2ND [facility];  Service: General;  Laterality: Left;    INJECTION OF BOTULINUM TOXIN TYPE A N/A 10/8/2018    Procedure: INJECTION, BOTULINUM TOXIN, TYPE A 300 UNITS;  Surgeon: Ronel Whittington MD;  Location: [facility] OR [address] [place] [address];  Service: Urology;  Laterality: N/A;    INJECTION OF BOTULINUM TOXIN TYPE A N/A 3/25/2019    Procedure: INJECTION, BOTULINUM TOXIN, TYPE A 300 UNITS;  Surgeon: Ronel Whittington MD;  Location: [address];  Service: Urology;  Laterality: N/A;    INJECTION OF BOTULINUM TOXIN TYPE A N/A 8/26/2019    Procedure: INJECTION, BOTULINUM TOXIN, TYPE A 300 UNITS;  Surgeon: Ronel Whittington MD;  Location: [address];  Service: Urology;  Laterality: N/A;    MASTECTOMY Left 8/1/2019    Procedure: MASTECTOMY 23 hour stay;  Surgeon: Samia Fulton MD;  Location: [facility] OR [address] [place] [address];  Service: General;  Laterality: Left;    MEDIPORT REMOVAL Right 6/24/2022    Procedure: REMOVAL, CATHETER, CENTRAL VENOUS, TUNNELED, WITH PORT;  Surgeon: Isauro Anderson MD;  Location: [facility];  Service: Radiology;  Laterality: Right;    OVARIAN CYST SURGERY  1985    REPLACEMENT OF STENT Left 12/23/2021    Procedure: REPLACEMENT, STENT;  Surgeon: Ronel Whittington MD;  Location: [address];  Service: Urology;  Laterality: Left;    REPLACEMENT OF STENT Left 2/18/2022    Procedure: REPLACEMENT, STENT;  Surgeon: Ronel Whittington MD;  Location: [address];  Service: Urology;  Laterality: Left;    RETROGRADE PYELOGRAPHY Left 2/18/2022    Procedure: PYELOGRAM, RETROGRADE;  Surgeon: Ronel Whittington MD;  Location: [address];  Service: Urology;   "Laterality: Left;    SENTINEL LYMPH NODE BIOPSY Left 8/1/2019    Procedure: BIOPSY, LYMPH NODE, SENTINEL;  Surgeon: Samia Fulton MD;  Location: [facility] OR 2ND FLR;  Service: General;  Laterality: Left;    spt placement      TONSILLECTOMY, ADENOIDECTOMY      TOTAL ABDOMINAL HYSTERECTOMY W/ BILATERAL SALPINGOOPHORECTOMY  1985    URETEROSCOPY Left 2/18/2022    Procedure: URETEROSCOPY;  Surgeon: Ronel Whittington MD;  Location: [facility] OR [facility]ST [facility];  Service: Urology;  Laterality: Left;         Current Outpatient Medications:     amLODIPine (NORVASC) 10 MG tablet, Take 1 tablet (10 mg total) by mouth once daily., Disp: 90 tablet, Rfl: 3    anastrozole (ARIMIDEX) 1 mg Tab, TAKE 1 TABLET BY MOUTH EVERY DAY, Disp: 90 tablet, Rfl: 2    apixaban (ELIQUIS) 5 mg Tab, Take 1 tablet (5 mg total) by mouth 2 (two) times daily., Disp: 180 tablet, Rfl: 3    apixaban (ELIQUIS) 5 mg Tab, Take 1 tablet by mouth 2 (two) times daily., Disp: , Rfl:     atorvastatin (LIPITOR) 80 MG tablet, Take 1 tablet (80 mg total) by mouth once daily., Disp: 90 tablet, Rfl: 3    BD INSULIN SYRINGE ULTRA-FINE 0.5 mL 31 gauge x 5/16" Syrg, USE WITH INSULIN 4 TIMES A DAY, Disp: 100 each, Rfl: 12    calcium acetate 667 mg Tab, 1 capsule 3 TIMES DAILY (route: oral), Disp: , Rfl:     calcium acetate,phosphat bind, (PHOSLO) 667 mg capsule, Take by mouth 3 (three) times daily., Disp: , Rfl:     calcium acetate,phosphat bind, (PHOSLO) 667 mg tablet, Take 1 tablet (667 mg total) by mouth 3 (three) times daily with meals., Disp: 90 tablet, Rfl: 11    carvediloL (COREG) 6.25 MG tablet, Take 1 tablet (6.25 mg total) by mouth 2 (two) times daily., Disp: 60 tablet, Rfl: 11    DULoxetine (CYMBALTA) 20 MG capsule, Take 30 mg by mouth once daily., Disp: , Rfl:     erenumab-aooe (AIMOVIG AUTOINJECTOR) 140 mg/mL autoinjector, 140 mg MONTHLY (route: subcutaneous), Disp: , Rfl:     erenumab-aooe (AIMOVIG AUTOINJECTOR) 140 mg/mL autoinjector, Inject 1 mL (140 mg total) into " "the skin every 30 days. No further refills without appointment, Disp: 1 mL, Rfl: 2    ergocalciferol (ERGOCALCIFEROL) 50,000 unit Cap, TAKE ONE CAPSULE BY MOUTH ONE TIME PER WEEK, TAKES ON TUESDAYS, Disp: 12 capsule, Rfl: 3    furosemide (LASIX) 40 MG tablet, Take 1 tablet (40 mg total) by mouth once daily., Disp: 30 tablet, Rfl: 11    furosemide (LASIX) 40 MG tablet, Take 1 tablet by mouth once daily., Disp: , Rfl:     GAVILYTE-C 240-22.72-6.72 -5.84 gram SolR, Take 4,000 mLs by mouth once., Disp: , Rfl:     HYDROcodone-acetaminophen (NORCO)  mg per tablet, Take 1 tablet by mouth every 6 (six) hours as needed for Pain., Disp: 120 tablet, Rfl: 0    insulin (LANTUS SOLOSTAR U-100 INSULIN) glargine 100 units/mL SubQ pen, INJECT 14-15 UNITS UNDER THE SKIN ONCE DAILY (Patient taking differently: INJECT 14-16 UNITS UNDER THE SKIN ONCE DAILY), Disp: 15 each, Rfl: 3    isosorbide mononitrate (IMDUR) 30 MG 24 hr tablet, Take 1 tablet (30 mg total) by mouth once daily., Disp: 30 tablet, Rfl: 11    isosorbide mononitrate (IMDUR) 30 MG 24 hr tablet, Take 1 tablet by mouth once daily., Disp: , Rfl:     magnesium oxide (MAG-OX) 400 mg (241.3 mg magnesium) tablet, Take 1 tablet (400 mg total) by mouth once daily., Disp: 30 tablet, Rfl: 2    magnesium oxide (MAGOX) 400 mg (241.3 mg magnesium) tablet, Take 1 tablet by mouth once daily., Disp: , Rfl:     methocarbamoL (ROBAXIN) 750 MG Tab, TAKE 1-2 TABLETS (750-1,500 MG TOTAL) BY MOUTH 3 (THREE) TIMES DAILY AS NEEDED (MUSCLE SPASMS)., Disp: 180 tablet, Rfl: 1    NIFEdipine (ADALAT CC) 90 MG TbSR, Take 1 tablet by mouth once daily., Disp: , Rfl:     NIFEdipine (PROCARDIA-XL) 90 MG (OSM) 24 hr tablet, Take 1 tablet (90 mg total) by mouth once daily., Disp: 30 tablet, Rfl: 11    oxybutynin (DITROPAN-XL) 10 MG 24 hr tablet, TAKE 1 TABLET BY MOUTH EVERY DAY, Disp: 90 tablet, Rfl: 4    pen needle, diabetic (BD ULTRA-FINE SHORT PEN NEEDLE) 31 gauge x 5/16" Ndle, USE WITH LANTUS " DAILY, e 11.65, Disp: 100 each, Rfl: 3    sumatriptan (IMITREX) 100 MG tablet, Take 1 tablet (100 mg total) by mouth 2 (two) times daily as needed for Migraine., Disp: 30 tablet, Rfl: 1    SYNTHROID 50 mcg tablet, TAKE 1 TABLET BY MOUTH BEFORE BREAKFAST. ADMINISTER ON AN EMPTY STOMACH AT LEAST 30 MINUTES BEFORE FOOD. IF RECEIVING TUBE FEEDS, HOLD TUBE FEEDS FOR 1 HOUR BEFORE AND AFTER LEVOTHYROXINE ADMINISTRATION., Disp: 90 tablet, Rfl: 2    traZODone (DESYREL) 100 MG tablet, TAKE 1 TABLET BY MOUTH NIGHTLY AS NEEDED FOR INSOMNIA., Disp: 90 tablet, Rfl: 2  No current facility-administered medications for this visit.    Facility-Administered Medications Ordered in Other Visits:     heparin (porcine) injection 2,000 Units, 2,000 Units, Intravenous, Once, Emmanuel Hare Jr., MD    Review of patient's allergies indicates:  No Known Allergies    Family History   Problem Relation Age of Onset    Diabetes Sister     Kidney disease Sister         CKD III    ALS Mother         d.    Cancer Maternal Grandmother         d. colon    Cancer Paternal Grandfather         d. lung    Scoliosis Brother         increased pain    Prostate cancer Brother         cured s/p surgery    Cancer Brother         rare cancer that got into the bones    Diabetes Maternal Aunt     Kidney disease Maternal Aunt     Diabetes Maternal Uncle     Amblyopia Neg Hx     Blindness Neg Hx     Cataracts Neg Hx     Glaucoma Neg Hx     Macular degeneration Neg Hx     Retinal detachment Neg Hx     Strabismus Neg Hx        Social History     Tobacco Use    Smoking status: Never    Smokeless tobacco: Never   Substance Use Topics    Alcohol use: No     Alcohol/week: 0.0 standard drinks of alcohol    Drug use: No       REVIEW OF SYSTEMS:  General: No chills, fever, malaise, changes in weight  HEENT: No visual changes, difficulty hearing  Cardiovascular: No chest pain, palpitations, claudication  Pulmonary: No dyspnea, cough, wheezing  Gastrointestinal: No  nausea, vomiting, diarrhea, constipation  Genitourinary: No dysuria, low urine output, hematuria  Endocrine: No polydipsia, polyphagia  Hematologic: No fatigue with exertion, pica, pallor  Musculoskeletal: No extremity or joint pain, no back pain, no difficulty with ambulation  Neurologic: no seizures, no headaches, no weakness  Psychiatric: no mood disturbance      PHYSICAL EXAM:   BP (!) 150/71 (BP Location: Right arm, Patient Position: Sitting, BP Method: Medium (Automatic))   Pulse 86   Temp 98.4 °F (36.9 °C) (Oral)   Constitutional:  Alert,   Well-appearing  In no distress.   Neurological: Normal speech  no focal findings  CN II - XII grossly intact.    Psychiatric: Mood and affect appropriate and symmetric.   HEENT: Normocephalic / atraumatic  PERRLA  Midline trachea  No scars across the neck   Cardiac: Regular rate and rhythm.   Pulmonary: Normal pulmonary effort.   Abdomen: Soft, not distended.     Skin: Warm and well perfused.    Vascular:  1+ radial pulses, 2+ brachial pulses bilaterally   Extremities/  Musculoskeletal: No edema.   Ballotable antecubital left cephalic vein, non ballotable on the right    Examination of the right arm shows evidence of what appears to be healed hidratinitis suppurativa in the axilla     IMAGING:  Vein mapping suggests possible marginally usable left upper arm cephalic vein,.  Notably this was not done with the tourniquet as his per usual per the patient's request because her prior mastectomy.    IMPRESSION:    End-stage renal disease x3 months.    She has marginal left cephalic vein although may ultimately require prosthetic graft.    She has been advised that she should have absolutely nothing done to her left arm including blood pressure measurements IV draws or Access surgery, given her prior mastectomy.  Notably however this was a simple mastectomy with only sentinel node biopsy did not have a axillary dissection.    As such I am not in agreement that a dialysis graft  or fistula is unsafe.      Given physical exam evidence prior infections in the contralateral (right) axillary area, I am quite concerned about placing a prosthetic graft in this vicinity given the high infection risk.  I have discussed this in detail with the patient in she concurs with proceeding with a left sided dialysis access    2.  Narcotic dependence for chronic back pain.   As such she will require higher doses of postoperative analgesia    PLAN:     Left brachiocephalic AV fistula versus upper arm AV graft, 02/07/2024  Regional block  Hold Eliquis for 2 days prior    I will personally interrogate her cephalic vein after the block is placed preferentially proceed with a fistula if I believe that the vein is adequate.  Otherwise I will place an upper arm prosthetic graft    I have explained the risks, benefits and alternatives for this procedure in detail.  The patient voices understanding and all questions have be answered, and agrees to proceed with the procedure.     WALE Friedman III, MD, FACS  Professor and Chief, Vascular and Endovascular Surgery      CC; Dr Fulton

## 2024-01-30 NOTE — LETTER
[facility] - Vascular Surg [facility]  [address]  [place] [address]  Phone: [phone]  Fax: [phone] February 7, 2024      Lavinia Nieto, MAMADOU  [address] [facility] [address]    Patient: Cecile Bowen   MR Number: [id number]   YOB: 1950   Date of Visit: 1/30/2024     Dear Ms. Nieto:    Thank you for referring Cecile Bowen to me for evaluation. Attached are the relevant portions of my assessment and plan of care.    If you have questions, please do not hesitate to call me. I look forward to following Cecile along with you.    Sincerely,    RODRIGO Friedman III, MD   Professor and Chief  Vascular and Endovascular Surgery  Vice-Chair, Department of Surgery  [facility]

## 2024-01-30 NOTE — H&P (VIEW-ONLY)
VASCULAR SURGERY SERVICE    REFERRING DOCTOR: Lavinia Nieto NP     CHIEF COMPLAINT:  End-stage renal disease    HISTORY OF PRESENT ILLNESS: Cecile Bowen is a 73 y.o. female with prior simple mastectomy on the left, sentinel node biopsy, no axillary node dissection, with end-stage renal disease times 3 months via right IJ PermCath who is sent for permanent dialysis access.  She is currently dialyzing only Mondays and Fridays.    She is right-handed.  She has no history of AICD or pacemaker placement.  She does have known bilateral carpal tunnel syndrome with the occasional numbness in her hands but has not had a release.    She takes Eliquis 5 mg but only once a day because of easy bruising.  She is on clear why she is on Eliquis    Finally she takes chronic narcotics for back pain    Past Medical History:   Diagnosis Date    Cervicogenic migraine     Chronic pain     CKD (chronic kidney disease) stage 4, GFR 15-29 ml/min     [facility]    CKD (chronic kidney disease) stage 4, GFR 15-29 ml/min     Diabetes mellitus     Long term use of Insulin, Diabetic Neuropathy    Dialysis patient 1/21/2022    Fibromyalgia     Hydronephrosis     Hyperlipidemia     Hypertension 12/12/2012    Hypothyroidism 12/12/2012    ARON (iron deficiency anemia)     Insomnia     Levoscoliosis     Malignant neoplasm of upper-outer quadrant of left breast in female, estrogen receptor positive     Metabolic acidosis     Mobility impaired     Nuclear sclerosis - Both Eyes 3/24/2014    VIJAYA (obstructive sleep apnea)     Osteopenia     Pulmonary nodule     Recurrent UTI     Renal manifestation of secondary diabetes mellitus     Secondary hyperparathyroidism, renal     Urinary retention        Past Surgical History:   Procedure Laterality Date    BIOPSY OF URETER Left 2/18/2022    Procedure: BIOPSY, URETER;  Surgeon: Ronel Whittington MD;  Location: [facility] OR [address] [place] [address];  Service: Urology;  Laterality: Left;    BREAST BIOPSY Right     benign     CHOLECYSTECTOMY      COLONOSCOPY N/A 1/13/2017    Procedure: COLONOSCOPY;  Surgeon: Morris Wiseman MD;  Location: [facility] ENDO (4TH FLR);  Service: Endoscopy;  Laterality: N/A;  Renal pt Nephrology advised to avoid phosphate preps    COLONOSCOPY N/A 12/7/2023    Procedure: COLONOSCOPY;  Surgeon: Herve Allen MD;  Location: [facility] ENDO (2ND FLR);  Service: Endoscopy;  Laterality: N/A;  HD MWF; labs prior  suprapubic catheter  pt does not ambulate-uses christina lift- will have sling with her  9/7 ref. by Anastasiia Campbell, , PEG, instr. mailed, Eliquis hold approval pending-[facility] to hold Eliquis 2 days per Dr Navarro-GT  1/30-precall complete-MS    CYSTOSCOPY N/A 10/8/2018    Procedure: CYSTOSCOPY;  Surgeon: Ronel Whittington MD;  [facility];  Service: Urology;  Laterality: N/A;  45 min    CYSTOSCOPY N/A 3/25/2019    Procedure: CYSTOSCOPY;  Surgeon: Ronel Whittington MD;  [facility];  Service: Urology;  Laterality: N/A;  45 min    CYSTOSCOPY N/A 8/26/2019    Procedure: CYSTOSCOPY;  Surgeon: Ronel Whittington MD;  [facility];  Service: Urology;  Laterality: N/A;  45 min    CYSTOSCOPY N/A 7/2/2021    Procedure: CYSTOSCOPY;  Surgeon: Ronel Whittington MD;  [facility];  Service: Urology;  Laterality: N/A;    CYSTOSCOPY  12/23/2021    Procedure: CYSTOSCOPY;  Surgeon: Ronel Whittington MD;  [facility];  Service: Urology;;    CYSTOSCOPY  2/18/2022    Procedure: CYSTOSCOPY;  Surgeon: Ronel Whittington MD;  [facility];  Service: Urology;;    CYSTOSCOPY W/ URETERAL STENT PLACEMENT Left 5/15/2021    Procedure: CYSTOSCOPY, WITH URETERAL STENT INSERTION;  Surgeon: Levy Sánchez Jr., MD;  Location: [facility] OR [address] [place] [address];  Service: Urology;  Laterality: Left;    CYSTOSCOPY WITH BIOPSY OF BLADDER N/A 1/27/2020    Procedure: CYSTOSCOPY, WITH BLADDER BIOPSY, WITH FULGURATION IF INDICATED;  Surgeon: Ronel FRAZIER  MD Pk;  Location: [facility] OR [address] [place] [address];  Service: Urology;  Laterality: N/A;    DILATION AND CURETTAGE OF UTERUS      GALLBLADDER SURGERY  2006    HYSTERECTOMY      INJECTION FOR SENTINEL NODE IDENTIFICATION Left 8/1/2019    Procedure: INJECTION, FOR SENTINEL NODE IDENTIFICATION;  Surgeon: Samia Fulton MD;  Location: [facility] OR 2ND [facility];  Service: General;  Laterality: Left;    INJECTION OF BOTULINUM TOXIN TYPE A N/A 10/8/2018    Procedure: INJECTION, BOTULINUM TOXIN, TYPE A 300 UNITS;  Surgeon: Ronel Whittington MD;  Location: [facility] OR [address] [place] [address];  Service: Urology;  Laterality: N/A;    INJECTION OF BOTULINUM TOXIN TYPE A N/A 3/25/2019    Procedure: INJECTION, BOTULINUM TOXIN, TYPE A 300 UNITS;  Surgeon: Ronel Whittington MD;  Location: [address];  Service: Urology;  Laterality: N/A;    INJECTION OF BOTULINUM TOXIN TYPE A N/A 8/26/2019    Procedure: INJECTION, BOTULINUM TOXIN, TYPE A 300 UNITS;  Surgeon: Ronel Whittington MD;  Location: [address];  Service: Urology;  Laterality: N/A;    MASTECTOMY Left 8/1/2019    Procedure: MASTECTOMY 23 hour stay;  Surgeon: Samia Fulton MD;  Location: [facility] OR [address] [place] [address];  Service: General;  Laterality: Left;    MEDIPORT REMOVAL Right 6/24/2022    Procedure: REMOVAL, CATHETER, CENTRAL VENOUS, TUNNELED, WITH PORT;  Surgeon: Isauro Anderson MD;  Location: [facility] CATH LAB;  Service: Radiology;  Laterality: Right;    OVARIAN CYST SURGERY  1985    REPLACEMENT OF STENT Left 12/23/2021    Procedure: REPLACEMENT, STENT;  Surgeon: Ronel Whittington MD;  Location: [address];  Service: Urology;  Laterality: Left;    REPLACEMENT OF STENT Left 2/18/2022    Procedure: REPLACEMENT, STENT;  Surgeon: Ronel Whittington MD;  Location: [address];  Service: Urology;  Laterality: Left;    RETROGRADE PYELOGRAPHY Left 2/18/2022    Procedure: PYELOGRAM, RETROGRADE;  Surgeon: Ronel Whittington MD;  Location: [address];  Service: Urology;   "Laterality: Left;    SENTINEL LYMPH NODE BIOPSY Left 8/1/2019    Procedure: BIOPSY, LYMPH NODE, SENTINEL;  Surgeon: Samia Fulton MD;  Location: [facility] OR 2ND FLR;  Service: General;  Laterality: Left;    spt placement      TONSILLECTOMY, ADENOIDECTOMY      TOTAL ABDOMINAL HYSTERECTOMY W/ BILATERAL SALPINGOOPHORECTOMY  1985    URETEROSCOPY Left 2/18/2022    Procedure: URETEROSCOPY;  Surgeon: Ronel Whittington MD;  Location: [facility] OR 1ST [facility];  Service: Urology;  Laterality: Left;         Current Outpatient Medications:     amLODIPine (NORVASC) 10 MG tablet, Take 1 tablet (10 mg total) by mouth once daily., Disp: 90 tablet, Rfl: 3    anastrozole (ARIMIDEX) 1 mg Tab, TAKE 1 TABLET BY MOUTH EVERY DAY, Disp: 90 tablet, Rfl: 2    apixaban (ELIQUIS) 5 mg Tab, Take 1 tablet (5 mg total) by mouth 2 (two) times daily., Disp: 180 tablet, Rfl: 3    apixaban (ELIQUIS) 5 mg Tab, Take 1 tablet by mouth 2 (two) times daily., Disp: , Rfl:     atorvastatin (LIPITOR) 80 MG tablet, Take 1 tablet (80 mg total) by mouth once daily., Disp: 90 tablet, Rfl: 3    BD INSULIN SYRINGE ULTRA-FINE 0.5 mL 31 gauge x 5/16" Syrg, USE WITH INSULIN 4 TIMES A DAY, Disp: 100 each, Rfl: 12    calcium acetate 667 mg Tab, 1 capsule 3 TIMES DAILY (route: oral), Disp: , Rfl:     calcium acetate,phosphat bind, (PHOSLO) 667 mg capsule, Take by mouth 3 (three) times daily., Disp: , Rfl:     calcium acetate,phosphat bind, (PHOSLO) 667 mg tablet, Take 1 tablet (667 mg total) by mouth 3 (three) times daily with meals., Disp: 90 tablet, Rfl: 11    carvediloL (COREG) 6.25 MG tablet, Take 1 tablet (6.25 mg total) by mouth 2 (two) times daily., Disp: 60 tablet, Rfl: 11    DULoxetine (CYMBALTA) 20 MG capsule, Take 30 mg by mouth once daily., Disp: , Rfl:     erenumab-aooe (AIMOVIG AUTOINJECTOR) 140 mg/mL autoinjector, 140 mg MONTHLY (route: subcutaneous), Disp: , Rfl:     erenumab-aooe (AIMOVIG AUTOINJECTOR) 140 mg/mL autoinjector, Inject 1 mL (140 mg total) into " "the skin every 30 days. No further refills without appointment, Disp: 1 mL, Rfl: 2    ergocalciferol (ERGOCALCIFEROL) 50,000 unit Cap, TAKE ONE CAPSULE BY MOUTH ONE TIME PER WEEK, TAKES ON TUESDAYS, Disp: 12 capsule, Rfl: 3    furosemide (LASIX) 40 MG tablet, Take 1 tablet (40 mg total) by mouth once daily., Disp: 30 tablet, Rfl: 11    furosemide (LASIX) 40 MG tablet, Take 1 tablet by mouth once daily., Disp: , Rfl:     GAVILYTE-C 240-22.72-6.72 -5.84 gram SolR, Take 4,000 mLs by mouth once., Disp: , Rfl:     HYDROcodone-acetaminophen (NORCO)  mg per tablet, Take 1 tablet by mouth every 6 (six) hours as needed for Pain., Disp: 120 tablet, Rfl: 0    insulin (LANTUS SOLOSTAR U-100 INSULIN) glargine 100 units/mL SubQ pen, INJECT 14-15 UNITS UNDER THE SKIN ONCE DAILY (Patient taking differently: INJECT 14-16 UNITS UNDER THE SKIN ONCE DAILY), Disp: 15 each, Rfl: 3    isosorbide mononitrate (IMDUR) 30 MG 24 hr tablet, Take 1 tablet (30 mg total) by mouth once daily., Disp: 30 tablet, Rfl: 11    isosorbide mononitrate (IMDUR) 30 MG 24 hr tablet, Take 1 tablet by mouth once daily., Disp: , Rfl:     magnesium oxide (MAG-OX) 400 mg (241.3 mg magnesium) tablet, Take 1 tablet (400 mg total) by mouth once daily., Disp: 30 tablet, Rfl: 2    magnesium oxide (MAGOX) 400 mg (241.3 mg magnesium) tablet, Take 1 tablet by mouth once daily., Disp: , Rfl:     methocarbamoL (ROBAXIN) 750 MG Tab, TAKE 1-2 TABLETS (750-1,500 MG TOTAL) BY MOUTH 3 (THREE) TIMES DAILY AS NEEDED (MUSCLE SPASMS)., Disp: 180 tablet, Rfl: 1    NIFEdipine (ADALAT CC) 90 MG TbSR, Take 1 tablet by mouth once daily., Disp: , Rfl:     NIFEdipine (PROCARDIA-XL) 90 MG (OSM) 24 hr tablet, Take 1 tablet (90 mg total) by mouth once daily., Disp: 30 tablet, Rfl: 11    oxybutynin (DITROPAN-XL) 10 MG 24 hr tablet, TAKE 1 TABLET BY MOUTH EVERY DAY, Disp: 90 tablet, Rfl: 4    pen needle, diabetic (BD ULTRA-FINE SHORT PEN NEEDLE) 31 gauge x 5/16" Ndle, USE WITH LANTUS " DAILY, e 11.65, Disp: 100 each, Rfl: 3    sumatriptan (IMITREX) 100 MG tablet, Take 1 tablet (100 mg total) by mouth 2 (two) times daily as needed for Migraine., Disp: 30 tablet, Rfl: 1    SYNTHROID 50 mcg tablet, TAKE 1 TABLET BY MOUTH BEFORE BREAKFAST. ADMINISTER ON AN EMPTY STOMACH AT LEAST 30 MINUTES BEFORE FOOD. IF RECEIVING TUBE FEEDS, HOLD TUBE FEEDS FOR 1 HOUR BEFORE AND AFTER LEVOTHYROXINE ADMINISTRATION., Disp: 90 tablet, Rfl: 2    traZODone (DESYREL) 100 MG tablet, TAKE 1 TABLET BY MOUTH NIGHTLY AS NEEDED FOR INSOMNIA., Disp: 90 tablet, Rfl: 2  No current facility-administered medications for this visit.    Facility-Administered Medications Ordered in Other Visits:     heparin (porcine) injection 2,000 Units, 2,000 Units, Intravenous, Once, Emmanuel Hare Jr., MD    Review of patient's allergies indicates:  No Known Allergies    Family History   Problem Relation Age of Onset    Diabetes Sister     Kidney disease Sister         CKD III    ALS Mother         d.    Cancer Maternal Grandmother         d. colon    Cancer Paternal Grandfather         d. lung    Scoliosis Brother         increased pain    Prostate cancer Brother         cured s/p surgery    Cancer Brother         rare cancer that got into the bones    Diabetes Maternal Aunt     Kidney disease Maternal Aunt     Diabetes Maternal Uncle     Amblyopia Neg Hx     Blindness Neg Hx     Cataracts Neg Hx     Glaucoma Neg Hx     Macular degeneration Neg Hx     Retinal detachment Neg Hx     Strabismus Neg Hx        Social History     Tobacco Use    Smoking status: Never    Smokeless tobacco: Never   Substance Use Topics    Alcohol use: No     Alcohol/week: 0.0 standard drinks of alcohol    Drug use: No       REVIEW OF SYSTEMS:  General: No chills, fever, malaise, changes in weight  HEENT: No visual changes, difficulty hearing  Cardiovascular: No chest pain, palpitations, claudication  Pulmonary: No dyspnea, cough, wheezing  Gastrointestinal: No  nausea, vomiting, diarrhea, constipation  Genitourinary: No dysuria, low urine output, hematuria  Endocrine: No polydipsia, polyphagia  Hematologic: No fatigue with exertion, pica, pallor  Musculoskeletal: No extremity or joint pain, no back pain, no difficulty with ambulation  Neurologic: no seizures, no headaches, no weakness  Psychiatric: no mood disturbance      PHYSICAL EXAM:   BP (!) 150/71 (BP Location: Right arm, Patient Position: Sitting, BP Method: Medium (Automatic))   Pulse 86   Temp 98.4 °F (36.9 °C) (Oral)   Constitutional:  Alert,   Well-appearing  In no distress.   Neurological: Normal speech  no focal findings  CN II - XII grossly intact.    Psychiatric: Mood and affect appropriate and symmetric.   HEENT: Normocephalic / atraumatic  PERRLA  Midline trachea  No scars across the neck   Cardiac: Regular rate and rhythm.   Pulmonary: Normal pulmonary effort.   Abdomen: Soft, not distended.     Skin: Warm and well perfused.    Vascular:  1+ radial pulses, 2+ brachial pulses bilaterally   Extremities/  Musculoskeletal: No edema.   Ballotable antecubital left cephalic vein, non ballotable on the right    Examination of the right arm shows evidence of what appears to be healed hidratinitis suppurativa in the axilla     IMAGING:  Vein mapping suggests possible marginally usable left upper arm cephalic vein,.  Notably this was not done with the tourniquet as his per usual per the patient's request because her prior mastectomy.    IMPRESSION:    End-stage renal disease x3 months.    She has marginal left cephalic vein although may ultimately require prosthetic graft.    She has been advised that she should have absolutely nothing done to her left arm including blood pressure measurements IV draws or Access surgery, given her prior mastectomy.  Notably however this was a simple mastectomy with only sentinel node biopsy did not have a axillary dissection.    As such I am not in agreement that a dialysis graft  or fistula is unsafe.      Given physical exam evidence prior infections in the contralateral (right) axillary area, I am quite concerned about placing a prosthetic graft in this vicinity given the high infection risk.  I have discussed this in detail with the patient in she concurs with proceeding with a left sided dialysis access    2.  Narcotic dependence for chronic back pain.   As such she will require higher doses of postoperative analgesia    PLAN:     Left brachiocephalic AV fistula versus upper arm AV graft, 02/07/2024  Regional block  Hold Eliquis for 2 days prior    I will personally interrogate her cephalic vein after the block is placed preferentially proceed with a fistula if I believe that the vein is adequate.  Otherwise I will place an upper arm prosthetic graft    I have explained the risks, benefits and alternatives for this procedure in detail.  The patient voices understanding and all questions have be answered, and agrees to proceed with the procedure.     WALE Friedman III, MD, FACS  Professor and Chief, Vascular and Endovascular Surgery      CC; Dr Fulton

## 2024-01-31 RX ORDER — METHOCARBAMOL 750 MG/1
TABLET, FILM COATED ORAL
Qty: 180 TABLET | Refills: 1 | Status: SHIPPED | OUTPATIENT
Start: 2024-01-31 | End: 2024-04-15 | Stop reason: SDUPTHER

## 2024-02-05 PROBLEM — Z87.440 PERSONAL HISTORY OF URINARY (TRACT) INFECTIONS: Status: RESOLVED | Noted: 2023-08-21 | Resolved: 2024-02-05

## 2024-02-05 NOTE — PRE-PROCEDURE INSTRUCTIONS
PreOp Instructions given:   - Verbal medication information (what to hold and what to take)   - NPO guidelines 2400  - Arrival place directions given; time to be given the day before procedure by the   Surgeon's Office DOSC  - Bathing with antibacterial soap   - Don't wear any jewelry or bring any valuables AM of surgery   - No makeup or moisturizer to face   - No perfume/cologne, powder, lotions or aftershave   Pt. verbalized understanding.   Pt denies any h/o Anesthesia/Sedation complications or side effects.  Patient does not know arrival time.  Explained that this information comes from the surgeon's office and if they haven't heard from them by 2 or 3 pm to call the office.  Patient stated an understanding.   *Ray lift needed*

## 2024-02-06 ENCOUNTER — TELEPHONE (OUTPATIENT)
Dept: VASCULAR SURGERY | Facility: CLINIC | Age: 74
End: 2024-02-06
Payer: MEDICARE

## 2024-02-06 NOTE — TELEPHONE ENCOUNTER
Per Dr. Friedman request nurse contacted pt to reschedule surgery with Dr. Friedman from Wednesday 2/7/24 to Wednesday 2/14/24 due to change in surgeon's schedule. Instructed pt to resume taking Eliquis today and to begin holding Eliquis on Monday 2/12/24. Pt repeated instructions and verbalized understanding.

## 2024-02-08 ENCOUNTER — OUTPATIENT CASE MANAGEMENT (OUTPATIENT)
Dept: ADMINISTRATIVE | Facility: OTHER | Age: 74
End: 2024-02-08
Payer: MEDICARE

## 2024-02-08 ENCOUNTER — TELEPHONE (OUTPATIENT)
Dept: INTERNAL MEDICINE | Facility: CLINIC | Age: 74
End: 2024-02-08
Payer: MEDICARE

## 2024-02-08 ENCOUNTER — PATIENT MESSAGE (OUTPATIENT)
Dept: UROLOGY | Facility: CLINIC | Age: 74
End: 2024-02-08
Payer: MEDICARE

## 2024-02-08 DIAGNOSIS — Z79.4 TYPE 2 DIABETES MELLITUS WITH STAGE 4 CHRONIC KIDNEY DISEASE, WITH LONG-TERM CURRENT USE OF INSULIN: Primary | Chronic | ICD-10-CM

## 2024-02-08 DIAGNOSIS — N18.4 CKD (CHRONIC KIDNEY DISEASE) STAGE 4, GFR 15-29 ML/MIN: Chronic | ICD-10-CM

## 2024-02-08 DIAGNOSIS — E11.22 TYPE 2 DIABETES MELLITUS WITH STAGE 4 CHRONIC KIDNEY DISEASE, WITH LONG-TERM CURRENT USE OF INSULIN: Primary | Chronic | ICD-10-CM

## 2024-02-08 DIAGNOSIS — N18.4 TYPE 2 DIABETES MELLITUS WITH STAGE 4 CHRONIC KIDNEY DISEASE, WITH LONG-TERM CURRENT USE OF INSULIN: Primary | Chronic | ICD-10-CM

## 2024-02-08 NOTE — TELEPHONE ENCOUNTER
Request orders for [facility]    Ms. Bowen reports pressure sore to buttocks, (sounds like Stg1). She would like further  PT due to progressive weakness to LEs and chair bound status she says in the last 10-12 months. She last had [facility] approx 10/2023. If in agreement, please fax orders to   [facility]- FAX:  [phone]

## 2024-02-08 NOTE — TELEPHONE ENCOUNTER
Spoke to pt in regards to reaching out to her Nephrologist due to he see her on regular basis for her condition.

## 2024-02-08 NOTE — TELEPHONE ENCOUNTER
----- Message from Danielle Woods RN sent at 2/8/2024  3:14 PM CST -----  Regarding: Outpatient Care Managment Enrollment -Findings  Dr. Navarro/Staff:    Your patient [id number] Cecile Bowen  was  referred to [facility] by Self Referral .      My name is Danielle Woods RN, Care Manager.    Tasha Renteria LCSW is added to Care Team to assist with Medicaid reapplication, food insecurities.     In our enrollment encounter, the following needs were identified:  Financial Assistance, Food, and Self-care Management Education    1) Ms. Bowen reports pressure sore to buttocks, (sounds like Stg1). She would like further  PT due to progressive weakness to LEs and chair bound status she says in the last 10-12 months. She last had [facility] approx 10/2023. If in agreement, please fax orders to   [facility]- FAX:  [phone]    All needs and services provided by [facility] Man[facility] and other care team members will be communicated to you via your Resource Pool.      Our documentation can be readily accessed in the EMR using the following tabs: Chart review -> Episodes: High Risk.     It is our goal to provide holistic care, if at any time you feel other areas of concern need to be addressed, please communicate with me by Inbasket messaging.      Thank you,  GUILLE Chavez, RN, CCM [facility]  [email or web address]  TEL:  [phone]

## 2024-02-08 NOTE — TELEPHONE ENCOUNTER
The patient is a dialysis patient under the care of Nephrology, I have not seen her in approximately 6 mos,  Would think these orders would be directed to her Nephrologist that she sees on a regular basis and is more familiar w/ her condition..

## 2024-02-08 NOTE — LETTER
Cecile Bowen  [address] [place] [address]    Dear Cecile Bowen,     Welcome to [facility]. We are here to assist patients with multiple long-term (chronic) conditions who often need more personalized healthcare.    It was a pleasure talking with you today. My name is Danielle Woods RN. I look forward to working with you as your Care Manager. I will be contacting you by telephone routinely to help coordinate care and resolve issues.    My goal is to help you function at the healthiest and highest level possible. You can contact me directly at [phone].    As an [facility] patient with [employer], some of the services we provide, at no cost to you, include:     Development of an individualized care plan with a Registered Nurse   Connection with a   Assistance from a Community Health Worker  Connection with available resources and services    Coordinate communication among your care team members   Provide coaching and education  Help you understand your doctor's treatment plan  Help you obtain information about your insurance coverage.    All services provided by [facility] Man[facility] and other care team members are coordinated with and communicated to your primary care team.      As part of your enrollment, you will be receiving education materials and more information about these services in your [facility] account, by phone, or through the mail. If you do not wish to participate or receive information, you can Opt Out by contacting our office at [phone].      Sincerely,        Danielle Woods RN  [facility]   Outpatient Care Management       [facility]:  Danielle Woods RN, [facility]- [facility] Nurse   Tel:[phone] Mon-Fri, 8 am- 4:30 pm    Social Yung Worker, [facility] ,   TEL: [phone],  Mon-Fri, 8 am- 4:30 pm     (Change in  assignment  "to Tasha Vicente from Renee Larousse) [facility] On [facility], 24//7 Nurse Help Line (non emergent)- TEL:  [phone]    [facility] Technical Issue Support Team--TEL:  [phone], Mon-Fri 9 am- 5 pm.    [email or web address] online patient portal    [facility] Financial Assistance TEL:  [phone]    Digital Medicine Program- TEL:  [phone]        [employer] Managed Care:    [employer] Cristhian Life Line Benefit- TEL:  [phone]    [employer] First 24 Hour Nurse Line--TEL:  [phone]    [employer] Managed Care,  Over-the-Counter Benefit-- TEL:  [phone]  On line:  BrainBot select "Over-the-Counter (OTC) items from the "Shop OTC & Supplies drop down at the top of the page    [employer] Managed Care, [employer]- [facility]  On line [employer] Exercise and Health Education Resources        [email or web address]    [employer] Managed Care, "Mom's Meal", TEL: [phone]    [employer] Managed Care - Transportation Reservation-- TEL:  [phone]  Mon-Fri, 8 am-5 pm, 3 business days notice required.                 "

## 2024-02-11 ENCOUNTER — PATIENT MESSAGE (OUTPATIENT)
Dept: PHYSICAL MEDICINE AND REHAB | Facility: CLINIC | Age: 74
End: 2024-02-11
Payer: MEDICARE

## 2024-02-12 ENCOUNTER — TELEPHONE (OUTPATIENT)
Dept: VASCULAR SURGERY | Facility: CLINIC | Age: 74
End: 2024-02-12
Payer: MEDICARE

## 2024-02-12 ENCOUNTER — ANESTHESIA EVENT (OUTPATIENT)
Dept: SURGERY | Facility: HOSPITAL | Age: 74
End: 2024-02-12
Payer: MEDICARE

## 2024-02-12 RX ORDER — DULOXETIN HYDROCHLORIDE 20 MG/1
40 CAPSULE, DELAYED RELEASE ORAL DAILY
Qty: 180 CAPSULE | Refills: 1 | Status: SHIPPED | OUTPATIENT
Start: 2024-02-12

## 2024-02-12 RX ORDER — HYDROCODONE BITARTRATE AND ACETAMINOPHEN 10; 325 MG/1; MG/1
1 TABLET ORAL EVERY 6 HOURS PRN
Qty: 120 TABLET | Refills: 0 | Status: SHIPPED | OUTPATIENT
Start: 2024-02-16 | End: 2024-03-20 | Stop reason: SDUPTHER

## 2024-02-14 ENCOUNTER — ANESTHESIA (OUTPATIENT)
Dept: SURGERY | Facility: HOSPITAL | Age: 74
End: 2024-02-14
Payer: MEDICARE

## 2024-02-14 ENCOUNTER — HOSPITAL ENCOUNTER (OUTPATIENT)
Facility: HOSPITAL | Age: 74
Discharge: HOME | End: 2024-02-14
Attending: SURGERY | Admitting: SURGERY
Payer: MEDICARE

## 2024-02-14 ENCOUNTER — PATIENT MESSAGE (OUTPATIENT)
Dept: NEUROLOGY | Facility: CLINIC | Age: 74
End: 2024-02-14
Payer: MEDICARE

## 2024-02-14 VITALS
RESPIRATION RATE: 18 BRPM | TEMPERATURE: 98 F | HEART RATE: 79 BPM | OXYGEN SATURATION: 100 % | SYSTOLIC BLOOD PRESSURE: 168 MMHG | WEIGHT: 218 LBS | HEIGHT: 68 IN | DIASTOLIC BLOOD PRESSURE: 72 MMHG | BODY MASS INDEX: 33.04 KG/M2

## 2024-02-14 DIAGNOSIS — N18.6 ESRD (END STAGE RENAL DISEASE) ON DIALYSIS: Primary | ICD-10-CM

## 2024-02-14 DIAGNOSIS — G43.711 INTRACTABLE CHRONIC MIGRAINE WITHOUT AURA AND WITH STATUS MIGRAINOSUS: Primary | ICD-10-CM

## 2024-02-14 DIAGNOSIS — Z99.2 ESRD (END STAGE RENAL DISEASE) ON DIALYSIS: Primary | ICD-10-CM

## 2024-02-14 LAB — POCT GLUCOSE: 174 MG/DL (ref 70–110)

## 2024-02-14 PROCEDURE — 37000008 HC ANESTHESIA 1ST 15 MINUTES: Mod: HCNC | Performed by: SURGERY

## 2024-02-14 PROCEDURE — 63600175 PHARM REV CODE 636 W HCPCS: Mod: HCNC | Performed by: SURGERY

## 2024-02-14 PROCEDURE — 71000015 HC POSTOP RECOV 1ST HR: Mod: HCNC | Performed by: SURGERY

## 2024-02-14 PROCEDURE — D9220A PRA ANESTHESIA: Mod: HCNC,CRNA,, | Performed by: NURSE ANESTHETIST, CERTIFIED REGISTERED

## 2024-02-14 PROCEDURE — 25000003 PHARM REV CODE 250: Mod: HCNC | Performed by: ANESTHESIOLOGY

## 2024-02-14 PROCEDURE — 25000003 PHARM REV CODE 250: Mod: HCNC | Performed by: NURSE ANESTHETIST, CERTIFIED REGISTERED

## 2024-02-14 PROCEDURE — 63600175 PHARM REV CODE 636 W HCPCS: Mod: HCNC | Performed by: NURSE ANESTHETIST, CERTIFIED REGISTERED

## 2024-02-14 PROCEDURE — 37000009 HC ANESTHESIA EA ADD 15 MINS: Mod: HCNC | Performed by: SURGERY

## 2024-02-14 PROCEDURE — 82962 GLUCOSE BLOOD TEST: CPT | Mod: HCNC | Performed by: SURGERY

## 2024-02-14 PROCEDURE — 71000016 HC POSTOP RECOV ADDL HR: Mod: HCNC | Performed by: SURGERY

## 2024-02-14 PROCEDURE — 36821 AV FUSION DIRECT ANY SITE: CPT | Mod: HCNC,LT,, | Performed by: SURGERY

## 2024-02-14 PROCEDURE — 36000706: Mod: HCNC | Performed by: SURGERY

## 2024-02-14 PROCEDURE — 71000044 HC DOSC ROUTINE RECOVERY FIRST HOUR: Mod: HCNC | Performed by: SURGERY

## 2024-02-14 PROCEDURE — 36000707: Mod: HCNC | Performed by: SURGERY

## 2024-02-14 PROCEDURE — D9220A PRA ANESTHESIA: Mod: HCNC,ANES,, | Performed by: ANESTHESIOLOGY

## 2024-02-14 RX ORDER — CEFAZOLIN 2 G/1
INJECTION, POWDER, FOR SOLUTION INTRAMUSCULAR; INTRAVENOUS
Status: DISCONTINUED | OUTPATIENT
Start: 2024-02-14 | End: 2024-02-14

## 2024-02-14 RX ORDER — ONDANSETRON 2 MG/ML
INJECTION INTRAMUSCULAR; INTRAVENOUS
Status: DISCONTINUED | OUTPATIENT
Start: 2024-02-14 | End: 2024-02-14

## 2024-02-14 RX ORDER — PROPOFOL 10 MG/ML
VIAL (ML) INTRAVENOUS CONTINUOUS PRN
Status: DISCONTINUED | OUTPATIENT
Start: 2024-02-14 | End: 2024-02-14

## 2024-02-14 RX ORDER — LIDOCAINE HYDROCHLORIDE 10 MG/ML
1 INJECTION, SOLUTION EPIDURAL; INFILTRATION; INTRACAUDAL; PERINEURAL ONCE AS NEEDED
Status: DISCONTINUED | OUTPATIENT
Start: 2024-02-14 | End: 2024-02-14 | Stop reason: HOSPADM

## 2024-02-14 RX ORDER — HEPARIN SODIUM 1000 [USP'U]/ML
INJECTION, SOLUTION INTRAVENOUS; SUBCUTANEOUS
Status: DISCONTINUED | OUTPATIENT
Start: 2024-02-14 | End: 2024-02-14

## 2024-02-14 RX ORDER — HALOPERIDOL 5 MG/ML
0.5 INJECTION INTRAMUSCULAR EVERY 10 MIN PRN
Status: DISCONTINUED | OUTPATIENT
Start: 2024-02-14 | End: 2024-02-14 | Stop reason: HOSPADM

## 2024-02-14 RX ORDER — LIDOCAINE HYDROCHLORIDE 20 MG/ML
INJECTION INTRAVENOUS
Status: DISCONTINUED | OUTPATIENT
Start: 2024-02-14 | End: 2024-02-14

## 2024-02-14 RX ORDER — PHENYLEPHRINE HYDROCHLORIDE 10 MG/ML
INJECTION INTRAVENOUS
Status: DISCONTINUED | OUTPATIENT
Start: 2024-02-14 | End: 2024-02-14

## 2024-02-14 RX ORDER — FENTANYL CITRATE 50 UG/ML
INJECTION, SOLUTION INTRAMUSCULAR; INTRAVENOUS
Status: DISCONTINUED | OUTPATIENT
Start: 2024-02-14 | End: 2024-02-14

## 2024-02-14 RX ORDER — OXYCODONE HYDROCHLORIDE 5 MG/1
5 TABLET ORAL EVERY 4 HOURS PRN
Qty: 20 TABLET | Refills: 0 | Status: SHIPPED | OUTPATIENT
Start: 2024-02-14 | End: 2024-02-21

## 2024-02-14 RX ORDER — HYDROMORPHONE HYDROCHLORIDE 1 MG/ML
0.2 INJECTION, SOLUTION INTRAMUSCULAR; INTRAVENOUS; SUBCUTANEOUS EVERY 5 MIN PRN
Status: DISCONTINUED | OUTPATIENT
Start: 2024-02-14 | End: 2024-02-14 | Stop reason: HOSPADM

## 2024-02-14 RX ORDER — MIDAZOLAM HYDROCHLORIDE 1 MG/ML
INJECTION, SOLUTION INTRAMUSCULAR; INTRAVENOUS
Status: DISCONTINUED | OUTPATIENT
Start: 2024-02-14 | End: 2024-02-14

## 2024-02-14 RX ORDER — OXYCODONE HYDROCHLORIDE 5 MG/1
5 TABLET ORAL
Status: DISCONTINUED | OUTPATIENT
Start: 2024-02-14 | End: 2024-02-14 | Stop reason: HOSPADM

## 2024-02-14 RX ORDER — HEPARIN SODIUM 1000 [USP'U]/ML
INJECTION, SOLUTION INTRAVENOUS; SUBCUTANEOUS
Status: DISCONTINUED | OUTPATIENT
Start: 2024-02-14 | End: 2024-02-14 | Stop reason: HOSPADM

## 2024-02-14 RX ORDER — SODIUM CHLORIDE 9 MG/ML
INJECTION, SOLUTION INTRAVENOUS CONTINUOUS
Status: DISCONTINUED | OUTPATIENT
Start: 2024-02-14 | End: 2024-02-14 | Stop reason: HOSPADM

## 2024-02-14 RX ADMIN — PHENYLEPHRINE HYDROCHLORIDE 100 MCG: 10 INJECTION INTRAVENOUS at 11:02

## 2024-02-14 RX ADMIN — PHENYLEPHRINE HYDROCHLORIDE 200 MCG: 10 INJECTION INTRAVENOUS at 11:02

## 2024-02-14 RX ADMIN — ONDANSETRON 500 MG: 2 INJECTION INTRAMUSCULAR; INTRAVENOUS at 11:02

## 2024-02-14 RX ADMIN — SODIUM CHLORIDE: 0.9 INJECTION, SOLUTION INTRAVENOUS at 09:02

## 2024-02-14 RX ADMIN — PHENYLEPHRINE HYDROCHLORIDE 100 MCG: 10 INJECTION INTRAVENOUS at 10:02

## 2024-02-14 RX ADMIN — FENTANYL CITRATE 50 MCG: 50 INJECTION, SOLUTION INTRAMUSCULAR; INTRAVENOUS at 09:02

## 2024-02-14 RX ADMIN — PROPOFOL 100 MCG/KG/MIN: 10 INJECTION, EMULSION INTRAVENOUS at 09:02

## 2024-02-14 RX ADMIN — LIDOCAINE HYDROCHLORIDE 50 MG: 20 INJECTION INTRAVENOUS at 09:02

## 2024-02-14 RX ADMIN — MIDAZOLAM HYDROCHLORIDE 2 MG: 1 INJECTION, SOLUTION INTRAMUSCULAR; INTRAVENOUS at 09:02

## 2024-02-14 RX ADMIN — MEPIVACAINE HYDROCHLORIDE 40 ML: 15 INJECTION, SOLUTION EPIDURAL; INFILTRATION at 09:02

## 2024-02-14 RX ADMIN — OXYCODONE 5 MG: 5 TABLET ORAL at 12:02

## 2024-02-14 RX ADMIN — PHENYLEPHRINE HYDROCHLORIDE 200 MCG: 10 INJECTION INTRAVENOUS at 10:02

## 2024-02-14 RX ADMIN — CEFAZOLIN 2 EACH: 2 INJECTION, POWDER, FOR SOLUTION INTRAMUSCULAR; INTRAVENOUS at 10:02

## 2024-02-14 RX ADMIN — HEPARIN SODIUM 3000 UNITS: 1000 INJECTION, SOLUTION INTRAVENOUS; SUBCUTANEOUS at 10:02

## 2024-02-14 NOTE — PROGRESS NOTES
Outpatient Care Management  Initial Patient Assessment    Patient: Cecile Bowen  MRN: [id number]  Date of Service: 02/08/2024  Completed by: Danielle Woods RN  Referral Date: 02/08/2024  Date of Eligibility: 2/8/2024  Program: High Risk  Status: Ongoing  Effective Dates: 2/8/2024 - present  Responsible Staff: Danielle Woods, RN        Reason for Visit   Patient presents with    Initial Assessment    OPCM Chart Review       Brief Summary:  Cecile Bowen was identified as eligible for OPCM during evaluation process over the phone with her sister, Kat Sánchez. Kat and Cecile mentioned the concerns Cecile was having regarding her Medicaid and not getting approved for it to continue beyond June 30th, 2024. As a result, her limitless transportation through [employer] would end. Cecile uses the transportation to attend her bi weekly HD treatments. Cecile would like any assistance and is considered a self-referral to OPCM. She denies getting food stamps since her sister lives in the same household and her sister does get food stamps. Food insecurities are mentioned. Patient qualifies for program based on 77.2% risk score.   Active problem list, medical, surgical and social history reviewed. Active comorbidities include Afib, HTN, DM2, CKD4,.Urinary retention/hydronephrosis, Suprapubic catheter, Migraines, Debility- requires daily assistance for ADLs, Mastectomy Left.  Areas of need identified by patient include getting help to reapply and keep her Medicaid that she claims she will be cut off from after June 30th 2024. Referred to OPCM SW to determine what the situation is with pt's Medicaid and to explore community resources, food resources to assist pt in her care. .   Next steps: F/u in one week to check on patient status, pressure injury status to buttocks, check on PCP agreement to  services.     Disability Status  Is the patient alert and oriented (person, place, time, and situation)?: Alert and oriented x 4  Hearing  Difficulty or Deaf: no  Visual Difficulty or Blind: yes  Visual and Hearing Needs Conclusion: -- (Needs 2 different pairs of glasses one for reading & one for watching TV)  Vision Management: -- (Wears rx glasses)  Difficulty Concentrating, Remembering or Making Decisions: -- (Forgetting sometimes)  Communication Difficulty: no  Eating/Swallowing Difficulty: no  Walking or Climbing Stairs Difficulty: yes  Walking or Climbing Stairs: -- (Chair bound.)  Mobility Management: -- (Requires max assistance with transfers.Ray lift can be used by her sister, Kat.)  Dressing/Bathing Difficulty: yes  Dressing/Bathing: bathing difficulty, dependent; dressing difficulty, dependent  Dressing/Bathing Management: -- (Pt's sister provides all assistance)  Toileting : Dependent  Continence : Cathererization - Suprapubic; Incontinence - Bowel (Wears diapers)  Difficulty Managing Errands Independently: yes  Errands Management: -- (Pt's sister manages all errands, household duties, cooking, cleaning, [employer] is providing limitless transportation to HD 2 x wk, medical appts.)  Equipment Currently Used at Home: wheelchair; lift device; glucometer  ADL Conclusion Statement: -- (Pt reports having a fall 7 months ago when she was still walking. At some point since then she became chair bound.)  Change in Functional Status Since Onset of Current Illness/Injury: yes        Spiritual Beliefs  Spiritual, Cultural Beliefs, Taoism Practices, Values that Affect Care: no      Social History     Socioeconomic History    Marital status:     Number of children: 0    Highest education level: Master's degree (e.g., MA, MS, Lelo, MEd, MSW, BANDAR)   Occupational History    Occupation: teacher     Comment: retired   Tobacco Use    Smoking status: Never    Smokeless tobacco: Never   Substance and Sexual Activity    Alcohol use: No     Alcohol/week: 0.0 standard drinks of alcohol    Drug use: No    Sexual activity: Not Currently     Birth  control/protection: Abstinence   Social History Narrative    Single ()             Brother passed away last year.  Pt lives her her sister. (5/27)     Social Determinants of Health     Financial Resource Strain: Medium Risk (12/28/2023)    Overall Financial Resource Strain (CARDIA)     Difficulty of Paying Living Expenses: Somewhat hard   Food Insecurity: No Food Insecurity (12/28/2023)    Hunger Vital Sign     Worried About Running Out of Food in the Last Year: Never true     Ran Out of Food in the Last Year: Never true   Transportation Needs: Unmet Transportation Needs (12/28/2023)    PRAPARE - Transportation     Lack of Transportation (Medical): Yes     Lack of Transportation (Non-Medical): Yes   Physical Activity: Insufficiently Active (12/28/2023)    Exercise Vital Sign     Days of Exercise per Week: 3 days     Minutes of Exercise per Session: 20 min   Stress: No Stress Concern Present (12/28/2023)    [employer] - Occupational Stress Questionnaire     Feeling of Stress : Not at all   Social Connections: Moderately Integrated (12/28/2023)    Social Connection and Isolation Panel [NHANES]     Frequency of Communication with Friends and Family: More than three times a week     Frequency of Social Gatherings with Friends and Family: Never     Attends [employer] Services: More than 4 times per year     Active Member of Clubs or Organizations: Yes     Attends Club or Organization Meetings: Never     Marital Status:    Housing Stability: Low Risk  (12/28/2023)    Housing Stability Vital Sign     Unable to Pay for Housing in the Last Year: No     Number of Places Lived in the Last Year: 1     Unstable Housing in the Last Year: No       Roles and Relationships  Primary Source of Support/Comfort: sibling(s)  Name of Support/Comfort Primary Source: Kat Sánchez-sister      Advance Directives (For Healthcare)  Advance Directive  (If Adv Dir status is received, view document under  Adv Dir in header or Chart Review Media tab): Patient does not have Advance Directive, requests information.        Patient Reported Insurance  Verified current insurance plan:: [employer]age  [employer] benefits discussed:: OTC Prescription Discounts; [employer]; Transportation            2/8/2024     1:26 PM 12/11/2023     3:39 PM 5/31/2023    10:28 AM 6/29/2022     2:00 PM 3/15/2022     9:10 AM 6/4/2021     3:07 PM 2/7/2020     1:42 PM   Depression Patient Health Questionnaire   Over the last two weeks how often have you been bothered by little interest or pleasure in doing things Not at all Not at all Not at all Not at all Not at all Several days Not at all   Over the last two weeks how often have you been bothered by feeling down, depressed or hopeless Not at all Not at all Several days Not at all Not at all Several days Several days   PHQ-2 Total Score 0 0 1 0 0 2 1       Learning Assessment       08/17/2023 2141 [facility] - Observation 11H (8/17/2023 - 8/18/2023)   Created by Veronica Lujan, RN - RN (Nurse) Status: Complete                 PRIMARY LEARNER     Primary Learner Name:  Cecile Bowen AR - 08/17/2023 2141    Relationship:  Patient AR - 08/17/2023 2141    Does the primary learner have any barriers to learning?:  No Barriers AR - 08/17/2023 2141    What is the preferred language of the primary learner?:  English AR - 08/17/2023 2141    How does the primary learner prefer to learn new concepts?:  Listening AR - 08/17/2023 2141    How often do you need to have someone help you read instructions, pamphlets, or written material from your doctor or pharmacy?:  Never AR - 08/17/2023 2141        CO-LEARNER #1     No question answered        CO-LEARNER #2     No question answered        SPECIAL TOPICS     No question answered        ANSWERED BY:     No question answered        Edit History       Veronica Lujan, RN - RN (Nurse)   08/17/2023 2141

## 2024-02-14 NOTE — BRIEF OP NOTE
[facility] - Surgery ([facility])  Brief Operative Note    Surgery Date: 2/14/2024     Surgeon(s) and Role:     * RODRIGO Friedman III, MD - Primary     * Lionel Mancilla MD - Resident - Assisting    Pre-op Diagnosis:  ESRD (end stage renal disease) on dialysis [N18.6, Z99.2]    Post-op Diagnosis:  Post-Op Diagnosis Codes:     * ESRD (end stage renal disease) on dialysis [N18.6, Z99.2]    Procedure(s) (LRB):  CREATION, AV FISTULA (Left) brachio-cephalic    Anesthesia: Regional    Operative Findings: good artery (4mm) and vein (3.5 mm), good thrill, 2+ radial after  Very fragile skin, closed with nylons    Estimated Blood Loss: <10cc         Specimens:   Specimen (24h ago, onward)      None              Discharge Note    OUTCOME: successful outpatient procedure     DISPOSITION: home    FINAL DIAGNOSIS:  ESRD    FOLLOWUP: 3 weeks with quantitative AVF duplex    DISCHARGE INSTRUCTIONS:  see below, restart Eliqus in 3 days

## 2024-02-14 NOTE — DISCHARGE SUMMARY
[facility] - Surgery (2nd Fl)  Discharge Note  Short Stay    Procedure(s) (LRB):  CREATION, AV FISTULA (Left)      OUTCOME: Patient tolerated treatment/procedure well without complication and is now ready for discharge.    DISPOSITION: Home or Self Care    FINAL DIAGNOSIS:  ESRD (end stage renal disease) on dialysis    FOLLOWUP: In clinic    DISCHARGE INSTRUCTIONS:    Discharge Procedure Orders   Diet Adult Regular     Notify your health care provider if you experience any of the following:  temperature >100.4     Notify your health care provider if you experience any of the following:  persistent nausea and vomiting or diarrhea     Notify your health care provider if you experience any of the following:  severe uncontrolled pain     Notify your health care provider if you experience any of the following:  redness, tenderness, or signs of infection (pain, swelling, redness, odor or green/yellow discharge around incision site)     Notify your health care provider if you experience any of the following:  difficulty breathing or increased cough     Notify your health care provider if you experience any of the following:  severe persistent headache     Notify your health care provider if you experience any of the following:  worsening rash     Notify your health care provider if you experience any of the following:  persistent dizziness, light-headedness, or visual disturbances     Notify your health care provider if you experience any of the following:  increased confusion or weakness     Remove dressing in 48 hours     Activity as tolerated        TIME SPENT ON DISCHARGE: 10 minutes

## 2024-02-14 NOTE — DISCHARGE INSTRUCTIONS
VASCULAR SURGERY DISCHARGE INSTRUCTIONS    Medications:  - Resume taking Eliquis starting Saturday, 2/17/24.   - You have been provided a prescription for oxycodone 5mg. You should continue to take your previously prescripted pain medications (oxy 10 - tylenol 325) and supplement with oxycodone 5mg every 4 to 6 hours as needed.     Woundcare:  - Take your incision dressing off 2 days after your surgery and gently rinse your incision with soap and water daily. Pad the incision dry afterward  - If you have a dialysis catheter in place, keep your catheter dressing clean and dry  - If you do not have a catheter, take a full shower daily beginning 2 days after the surgery. Allow soap and water to run over your incision. Pad the incision dry afterward  - When resting or sleeping, try to keep your arm elevated to shoulder level on pillows to reduce swelling  - If you notice clear drainage from your incision, you can apply dry gauze daily and secure in place with tape or gentle elastic wrap    Activity:  - Avoid prolonged exertion of the affected arm  - Avoid keeping your arm down below your chest for prolonged periods of time (this could lead to increased swelling)  - No heavy lifting with the affected arm  - Sleep with your arm elevated on pillows at night to reduce swelling  -- No swimming in pools, [employer], [employer] etc. for 6 weeks after your surgery    Diet:  -Resume your pre-operative home diet    Follow up:  -Refer to follow up instructions     Call Vascular Surgery Office at [phone] if you experience:  -Increased redness, warmth, tenderness, or draining pus at your incision(s)  -Worsening fevers, chills, nausea/vomiting  -Pain, weakness, coldness, or numbness in your hand  -Uncontrolled pain  -Your call will be returned within 24 hours and further instructions will be provided    Go to ER/Urgent Care if you experience:  -Worsening shortness of breath or chest pain

## 2024-02-14 NOTE — ANESTHESIA PROCEDURE NOTES
Supraclavicular and ICB nerve block    Patient location during procedure: OR    Reason for block: primary anesthetic    Diagnosis: ESRD   Start time: 2/14/2024 9:40 AM  Timeout: 2/14/2024 9:39 AM   End time: 2/14/2024 9:47 AM    Staffing  Authorizing Provider: Crow Hernandez MD  Performing Provider: Dallas Abdi DO    Staffing  Performed by: Dallas Abdi DO  Authorized by: Crow Hernandez MD    Preanesthetic Checklist  Completed: patient identified, IV checked, site marked, risks and benefits discussed, surgical consent, monitors and equipment checked, pre-op evaluation and timeout performed  Peripheral Block  Patient position: supine  Prep: ChloraPrep  Patient monitoring: heart rate, cardiac monitor, continuous pulse ox, continuous capnometry and frequent blood pressure checks  Block type: supraclavicular  Laterality: left  Injection technique: single shot  Needle  Needle type: Stimuplex   Needle gauge: 22 G  Needle length: 2 in  Needle localization: anatomical landmarks and ultrasound guidance   -ultrasound image captured on disc.  Assessment  Injection assessment: negative aspiration, negative parasthesia and local visualized surrounding nerve  Paresthesia pain: none  Heart rate change: no  Slow fractionated injection: yes  Pain Tolerance: comfortable throughout block  Medications:    Medications: mepivacaine (CARBOCAINE) injection 15 mg/mL (1.5%) - Perineural   40 mL - 2/14/2024 9:47:00 AM    Additional Notes  Time out conducted. Site janeth confirmed with team and patient. Allergies reviewed.   Vital signs stable throughout block. RN monitoring vitals throughout.   Needle advanced under continuous ultrasound guidance.  Local injected incrementally after confirming negative aspiration. No signs or symptoms of intravascular or intraneural injection noted.   No persistent paresthesias elicited or expressed. Patient tolerated procedure well.  30 mL 1.5% mepivacaine with epinephrine 1:300K used  for the block.  Intercostal brachial field block performed with mepivacaine 1.5% 10ml for additional coverage.    VSS.  See anesthesia record.  Patient tolerated procedure well.

## 2024-02-14 NOTE — TRANSFER OF CARE
"Anesthesia Transfer of Care Note    Patient: Cecile Bowen    Procedure(s) Performed: Procedure(s) (LRB):  CREATION, AV FISTULA (Left)    Patient location: [facility]    Anesthesia Type: general    Transport from OR: Transported from OR on 6-10 L/min O2 by face mask with adequate spontaneous ventilation    Post pain: adequate analgesia    Post assessment: no apparent anesthetic complications    Post vital signs: stable    Level of consciousness: awake and alert    Nausea/Vomiting: no nausea/vomiting    Complications: none    Transfer of care protocol was followed      Last vitals: Visit Vitals  BP (!) 176/78 (BP Location: Right arm, Patient Position: Lying)   Pulse 77   Temp 36.6 °C (97.8 °F) (Oral)   Resp 16   Ht 5' 8" (1.727 m)   Wt 98.9 kg (218 lb)   SpO2 100%   Breastfeeding No   BMI 33.15 kg/m²     "

## 2024-02-14 NOTE — OP NOTE
[facility] - Surgery ([facility])  Operative Note     Surgery Date: 2/14/2024      Surgeon(s) and Role:     * RODRIGO Friedman III, MD - Primary     * Lionel Mancilla MD - Resident - Assisting     Pre-op Diagnosis:  ESRD (end stage renal disease) on dialysis [N18.6, Z99.2]     Post-op Diagnosis:  Post-Op Diagnosis Codes:     * ESRD (end stage renal disease) on dialysis [N18.6, Z99.2]     Procedure(s) (LRB):  CREATION, AV FISTULA (Left) brachio-cephalic     Anesthesia: Regional     Operative Findings: good artery (4mm) and vein (3.5 mm), good thrill, 2+ radial after  Very fragile skin, closed with nylons     Estimated Blood Loss: <10cc         Specimens:   Specimen (24h ago, onward)        None      Procedure in detail: The patient was identified and taken to the operating room and placed in supine position. Time-outs were performed using safety checklists to verify correct patient, procedure, site, pre-operative antibiotic administration and additional critical information prior to beginning the procedure. The procedure was performed under regional anesthesia.    Preoperatively the patient had a palpable radial pulse. Ultrasound was then performed to evaluate the cephalic vein. It was 3.5 mm in diameter and easily compressible.    The left arm was then outstretched at near 90 degrees and prepped and draped in usual sterile fashion.      A transverse, 5 cm  incision was made just distal to the antecubital fossa. The incision was deepened through the subcutaneous tissues with electrocautery to identify the cephalic vein. Using a combination of blunt and sharp dissection, the cephalic vein was dissected proximally and distally. The basilic vein was noted to come and be connected to the cephalic vein for approximately 1 cm before returning down the medial aspect of the forearm. The cephalic vein was then encircled with a vessel loop at its proximal end and its anterior surface was marked. The distal end of the cephalic vein  was ligated and transected proximally. Using Patricia scissors, the venous connection between the basilic and cephalic vain was ligated. A 6-0 Prolene in running fashion was used to close the venotomy on the basilic vein preserving its lumen.     Next, the incision was deepened through the fascia of the flexor compartment to identify the brachial artery. The brachial artery was then circumferentially dissected sufficiently to allow proximal and distal control with a vessel loop. The patient was given 3,000 U of heparin intravenously. he free end of the vein was then brought over to the brachial artery and spatulated to an appropriate length. An arteriotomy was made on the anterior surface of the brachial artery with an 11 blade and extended to a length of 6 mm with Patricia scissors. An end-to-side anastomosis was then created with a continuous running 6-0 Prolene suture. The sutures were then tied and the suture line evaluated for hemostasis which was adequate. There was evidence of an excellent thrill in the cephalic vein and 2+ radial pulse. There was no evidence of any kinks.      The subcutaneous tisssues were then closed with 3-0 Vicryl and the skin closed with a series vertical mattress and simple interrupted 3-0 nylon suture. A Telfa and gauze dressing was placed over the incision and secured with Tegaderm. There was evidence fof an excellent thrill in the cephalic vein after wound closure and the radial pulse was again checked and was 2+.      At the end of the case the needle, sponge, and instrument counts were correct. The patient was awoken from anesthesia and transferred to the PACU in stable condition.      Dr. Perry was present for the case.      Disposition: PACU, then home

## 2024-02-14 NOTE — ANESTHESIA PREPROCEDURE EVALUATION
02/14/2024  Cecile Bowen is a 73 y.o., female.      Pre-op Assessment    I have reviewed the Patient Summary Reports.          Review of Systems  Anesthesia Hx:  No problems with previous Anesthesia                Social:  Non-Smoker       Hematology/Oncology:  Hematology Normal   Oncology Normal                                   EENT/Dental:  EENT/Dental Normal           Cardiovascular:  Cardiovascular Normal                                            Pulmonary:        Sleep Apnea                Renal/:  Chronic Renal Disease, ESRD                Hepatic/GI:      Liver Disease,            Musculoskeletal:  Musculoskeletal Normal                Neurological:      Headaches                                 Endocrine:  Diabetes, type 2 Hypothyroidism          Dermatological:  Skin Normal    Psych:  Psychiatric Normal                  Physical Exam  General: Alert and Oriented    Airway:  Mallampati: II / II  Mouth Opening: Normal  TM Distance: Normal  Tongue: Normal  Neck ROM: Normal ROM    Dental:  Intact    Chest/Lungs:  Clear to auscultation, Normal Respiratory Rate    Heart:  Rate: Normal  Rhythm: Regular Rhythm  Sounds: Normal    Anesthesia Plan  Type of Anesthesia, risks & benefits discussed:    Anesthesia Type: Gen Natural Airway, Regional  Intra-op Monitoring Plan: Standard ASA Monitors  Post Op Pain Control Plan: multimodal analgesia  Airway Plan: Direct  Informed Consent: Informed consent signed with the Patient and all parties understand the risks and agree with anesthesia plan.  All questions answered.   ASA Score: 4    Ready For Surgery From Anesthesia Perspective.   .

## 2024-02-14 NOTE — PLAN OF CARE
Reviewed discharge instructions with patient.  Patient verbalizes understanding.  Vital stable, no complaints noted. Pt left floor via W/C, in [facility] transport van.  Pt was provided new wheelchair from hospital due to her wheelchair being lost.

## 2024-02-15 ENCOUNTER — OUTPATIENT CASE MANAGEMENT (OUTPATIENT)
Dept: ADMINISTRATIVE | Facility: OTHER | Age: 74
End: 2024-02-15
Payer: MEDICARE

## 2024-02-15 ENCOUNTER — OFFICE VISIT (OUTPATIENT)
Dept: UROLOGY | Facility: CLINIC | Age: 74
End: 2024-02-15
Payer: MEDICARE

## 2024-02-15 VITALS
HEIGHT: 68 IN | BODY MASS INDEX: 33.04 KG/M2 | HEART RATE: 83 BPM | DIASTOLIC BLOOD PRESSURE: 76 MMHG | SYSTOLIC BLOOD PRESSURE: 144 MMHG | WEIGHT: 218 LBS

## 2024-02-15 DIAGNOSIS — N18.6 ESRD ON HEMODIALYSIS: ICD-10-CM

## 2024-02-15 DIAGNOSIS — Z85.3 HISTORY OF BREAST CANCER: ICD-10-CM

## 2024-02-15 DIAGNOSIS — Z43.5 ENCOUNTER FOR CARE OR REPLACEMENT OF SUPRAPUBIC TUBE: ICD-10-CM

## 2024-02-15 DIAGNOSIS — N31.9 NEUROGENIC BLADDER: Primary | ICD-10-CM

## 2024-02-15 DIAGNOSIS — Z93.59 CHRONIC SUPRAPUBIC CATHETER: ICD-10-CM

## 2024-02-15 DIAGNOSIS — Z74.09 IMMOBILITY: ICD-10-CM

## 2024-02-15 DIAGNOSIS — Z99.2 ESRD ON HEMODIALYSIS: ICD-10-CM

## 2024-02-15 DIAGNOSIS — R33.9 URINARY RETENTION: ICD-10-CM

## 2024-02-15 PROCEDURE — 99499 UNLISTED E&M SERVICE: CPT | Mod: HCNC,S$GLB,, | Performed by: NURSE PRACTITIONER

## 2024-02-15 PROCEDURE — 3077F SYST BP >= 140 MM HG: CPT | Mod: HCNC,CPTII,S$GLB, | Performed by: NURSE PRACTITIONER

## 2024-02-15 PROCEDURE — 1159F MED LIST DOCD IN RCRD: CPT | Mod: HCNC,CPTII,S$GLB, | Performed by: NURSE PRACTITIONER

## 2024-02-15 PROCEDURE — 3008F BODY MASS INDEX DOCD: CPT | Mod: HCNC,CPTII,S$GLB, | Performed by: NURSE PRACTITIONER

## 2024-02-15 PROCEDURE — 1101F PT FALLS ASSESS-DOCD LE1/YR: CPT | Mod: HCNC,CPTII,S$GLB, | Performed by: NURSE PRACTITIONER

## 2024-02-15 PROCEDURE — 51705 CHANGE OF BLADDER TUBE: CPT | Mod: HCNC,S$GLB,, | Performed by: NURSE PRACTITIONER

## 2024-02-15 PROCEDURE — 1126F AMNT PAIN NOTED NONE PRSNT: CPT | Mod: HCNC,CPTII,S$GLB, | Performed by: NURSE PRACTITIONER

## 2024-02-15 PROCEDURE — 3066F NEPHROPATHY DOC TX: CPT | Mod: HCNC,CPTII,S$GLB, | Performed by: NURSE PRACTITIONER

## 2024-02-15 PROCEDURE — 3288F FALL RISK ASSESSMENT DOCD: CPT | Mod: HCNC,CPTII,S$GLB, | Performed by: NURSE PRACTITIONER

## 2024-02-15 PROCEDURE — 99999 PR PBB SHADOW E&M-EST. PATIENT-LVL IV: CPT | Mod: PBBFAC,HCNC,, | Performed by: NURSE PRACTITIONER

## 2024-02-15 PROCEDURE — 3078F DIAST BP <80 MM HG: CPT | Mod: HCNC,CPTII,S$GLB, | Performed by: NURSE PRACTITIONER

## 2024-02-15 RX ORDER — ERENUMAB-AOOE 140 MG/ML
INJECTION, SOLUTION SUBCUTANEOUS
Qty: 1 EACH | Refills: 0 | Status: SHIPPED | OUTPATIENT
Start: 2024-02-15 | End: 2024-02-27 | Stop reason: SDUPTHER

## 2024-02-15 NOTE — PROGRESS NOTES
CHIEF COMPLAINT:    Cecile Bowen is a 73 y.o. female presents today for Neurogenic Bladder.    HISTORY OF PRESENTING ILLINESS:    Cecile Bowen is a 73 y.o. female established with urology. She presents today for monthly SPT change. She is a diabetic female with history of bilateral hydronephrosis, incomplete bladder emptying which is managed by SPT (16fr) > 8 years.      Last SPT change was 2024 with us.     She is here today for SPT exchange.   Had missed last appointment due to issues with transportation.     [facility] had been seeing her but HH orders have  then she was no longer considered a candidate.  Would like for them to continue the service  She is unable to walk, drive, or use her motorized scooter.  Due to her condition at high risk for infections; recurrent uti's due to not changing her her SPT on time. and falls.     She has been diagnosed with breast cancer; s/p Mastectomy 2019  Malignant neoplasm of upper-outer quadrant of left breast in female, estrogen receptor positive   No need for chemo/radiation therapy.        2023 cysto for replacement of SPT. HH had trouble; only able to place a 12 fr.   Dr. Whittington up sized to 16fr and irrigated debris from bladder.   Last Cystoscopy with bladder botox injection (300u) 2018 with Dr. Whittington.     2021 found to have a left proximal ureteral stone, ZAK, and UTI; stent was place.  2021 cysto and left stent exchange; unable to have laser litho due to bacteremia.  2021 cysto with Left stent exchange.  2022 Left URS, cysto, stent exchange (now with strings). Ureteral bx; SPT exchange.                        REVIEW OF SYSTEMS:  Review of Systems   Constitutional: Negative.  Negative for chills and fever.   Eyes:  Negative for double vision.   Respiratory:  Negative for cough and shortness of breath.    Cardiovascular:  Negative for chest pain.   Gastrointestinal:  Negative for  abdominal pain, constipation, diarrhea, nausea and vomiting.   Genitourinary: Negative.  Negative for dysuria, flank pain and hematuria.        SPT draining. Cloudy urine; odor; not clogging up.      Musculoskeletal:         Unable to stand/ambulate on her own     Neurological:  Positive for weakness. Negative for dizziness and seizures.   Endo/Heme/Allergies:  Negative for polydipsia.         PATIENT HISTORY:    Past Medical History:   Diagnosis Date    Cervicogenic migraine     Chronic pain     CKD (chronic kidney disease) stage 4, GFR 15-29 ml/min     [facility]    CKD (chronic kidney disease) stage 4, GFR 15-29 ml/min     Diabetes mellitus     Long term use of Insulin, Diabetic Neuropathy    Dialysis patient 1/21/2022    Fibromyalgia     Hydronephrosis     Hyperlipidemia     Hypertension 12/12/2012    Hypothyroidism 12/12/2012    ARON (iron deficiency anemia)     Insomnia     Levoscoliosis     Malignant neoplasm of upper-outer quadrant of left breast in female, estrogen receptor positive     Metabolic acidosis     Mobility impaired     Nuclear sclerosis - Both Eyes 3/24/2014    VIJAYA (obstructive sleep apnea)     Osteopenia     Pulmonary nodule     Recurrent UTI     Renal manifestation of secondary diabetes mellitus     Secondary hyperparathyroidism, renal     Urinary retention        Past Surgical History:   Procedure Laterality Date    AV FISTULA PLACEMENT Left 2/14/2024    Procedure: CREATION, AV FISTULA;  Surgeon: RODRIGO Friedman III, MD;  Location: [facility] OR 2ND FLR;  Service: Vascular;  Laterality: Left;  LUE AVF creation vs AVG insertion    BIOPSY OF URETER Left 2/18/2022    Procedure: BIOPSY, URETER;  Surgeon: Ronel Whittington MD;  Location: [facility] OR 1ST FLR;  Service: Urology;  Laterality: Left;    BREAST BIOPSY Right     benign    CHOLECYSTECTOMY      COLONOSCOPY N/A 1/13/2017    Procedure: COLONOSCOPY;  Surgeon: Morris Wiseman MD;  Location: [facility] (4TH FLR);  Service: Endoscopy;   Laterality: N/A;  Renal pt Nephrology advised to avoid phosphate preps    COLONOSCOPY N/A 12/7/2023    Procedure: COLONOSCOPY;  Surgeon: Herve Allen MD;  Location: [facility] ENDO (2ND FLR);  Service: Endoscopy;  Laterality: N/A;  HD MWF; labs prior  suprapubic catheter  pt does not ambulate-uses christina lift- will have sling with her  9/7 ref. by Anastasiia Campbell, , PEG, instr. mailed, Eliquis hold approval pending-[facility] to hold Eliquis 2 days per Dr Navarro-GT  1/30-precall complete-MS    CYSTOSCOPY N/A 10/8/2018    Procedure: CYSTOSCOPY;  Surgeon: Ronel Whittington MD;  Location: [facility] OR 1ST FLR;  Service: Urology;  Laterality: N/A;  45 min    CYSTOSCOPY N/A 3/25/2019    Procedure: CYSTOSCOPY;  Surgeon: Ronel Whittington MD;  Location: [facility] OR 1ST FLR;  Service: Urology;  Laterality: N/A;  45 min    CYSTOSCOPY N/A 8/26/2019    Procedure: CYSTOSCOPY;  Surgeon: Ronel Whittington MD;  [facility];  Service: Urology;  Laterality: N/A;  45 min    CYSTOSCOPY N/A 7/2/2021    Procedure: CYSTOSCOPY;  Surgeon: Ronel Whittington MD;  [facility];  Service: Urology;  Laterality: N/A;    CYSTOSCOPY  12/23/2021    Procedure: CYSTOSCOPY;  Surgeon: Ronel Whittington MD;  [facility];  Service: Urology;;    CYSTOSCOPY  2/18/2022    Procedure: CYSTOSCOPY;  Surgeon: Ronel Whittington MD;  [facility];  Service: Urology;;    CYSTOSCOPY W/ URETERAL STENT PLACEMENT Left 5/15/2021    Procedure: CYSTOSCOPY, WITH URETERAL STENT INSERTION;  Surgeon: Levy Sánchez Jr., MD;  Location: [facility] OR 1ST FLR;  Service: Urology;  Laterality: Left;    CYSTOSCOPY WITH BIOPSY OF BLADDER N/A 1/27/2020    Procedure: CYSTOSCOPY, WITH BLADDER BIOPSY, WITH FULGURATION IF INDICATED;  Surgeon: Ronel Whittington MD;  Location: [facility] OR [address] [place] [address];  Service: Urology;  Laterality: N/A;    DILATION AND CURETTAGE OF UTERUS      GALLBLADDER SURGERY  2006    HYSTERECTOMY       INJECTION FOR SENTINEL NODE IDENTIFICATION Left 8/1/2019    Procedure: INJECTION, FOR SENTINEL NODE IDENTIFICATION;  Surgeon: Samia Fulton MD;  [facility];  Service: General;  Laterality: Left;    INJECTION OF BOTULINUM TOXIN TYPE A N/A 10/8/2018    Procedure: INJECTION, BOTULINUM TOXIN, TYPE A 300 UNITS;  Surgeon: Ronel Whittington MD;  Location: [facility] OR [address] [place] [address];  Service: Urology;  Laterality: N/A;    INJECTION OF BOTULINUM TOXIN TYPE A N/A 3/25/2019    Procedure: INJECTION, BOTULINUM TOXIN, TYPE A 300 UNITS;  Surgeon: Ronel Whittington MD;  Location: [facility] OR [address] [place] [address];  Service: Urology;  Laterality: N/A;    INJECTION OF BOTULINUM TOXIN TYPE A N/A 8/26/2019    Procedure: INJECTION, BOTULINUM TOXIN, TYPE A 300 UNITS;  Surgeon: Ronel Whittington MD;  Location: [facility] OR [address] [place] [address];  Service: Urology;  Laterality: N/A;    MASTECTOMY Left 8/1/2019    Procedure: MASTECTOMY 23 hour stay;  Surgeon: Samia Fulton MD;  Location: [facility] OR [address] [place] [address];  Service: General;  Laterality: Left;    MEDIPORT REMOVAL Right 6/24/2022    Procedure: REMOVAL, CATHETER, CENTRAL VENOUS, TUNNELED, WITH PORT;  Surgeon: Isauro Anderson MD;  Location: [facility] CATH LAB;  Service: Radiology;  Laterality: Right;    OVARIAN CYST SURGERY  1985    REPLACEMENT OF STENT Left 12/23/2021    Procedure: REPLACEMENT, STENT;  Surgeon: Ronel Whittington MD;  Location: [address];  Service: Urology;  Laterality: Left;    REPLACEMENT OF STENT Left 2/18/2022    Procedure: REPLACEMENT, STENT;  Surgeon: Ronel Whittington MD;  Location: [facility] OR [address] [place] [address];  Service: Urology;  Laterality: Left;    RETROGRADE PYELOGRAPHY Left 2/18/2022    Procedure: PYELOGRAM, RETROGRADE;  Surgeon: Ronel Whittington MD;  Location: [facility] OR [address] [place] [address];  Service: Urology;  Laterality: Left;    SENTINEL LYMPH NODE BIOPSY Left 8/1/2019    Procedure: BIOPSY, LYMPH NODE, SENTINEL;  Surgeon: Samia Fulton MD;  Location: [facility] OR [address] [place] [address];  Service:  General;  Laterality: Left;    spt placement      TONSILLECTOMY, ADENOIDECTOMY      TOTAL ABDOMINAL HYSTERECTOMY W/ BILATERAL SALPINGOOPHORECTOMY  1985    URETEROSCOPY Left 2/18/2022    Procedure: URETEROSCOPY;  Surgeon: Ronel Whittington MD;  Location: [facility] OR [address] [place] [address];  Service: Urology;  Laterality: Left;       Family History   Problem Relation Age of Onset    Diabetes Sister     Kidney disease Sister         CKD III    ALS Mother         d.    Cancer Maternal Grandmother         d. colon    Cancer Paternal Grandfather         d. lung    Scoliosis Brother         increased pain    Prostate cancer Brother         cured s/p surgery    Cancer Brother         rare cancer that got into the bones    Diabetes Maternal Aunt     Kidney disease Maternal Aunt     Diabetes Maternal Uncle     Amblyopia Neg Hx     Blindness Neg Hx     Cataracts Neg Hx     Glaucoma Neg Hx     Macular degeneration Neg Hx     Retinal detachment Neg Hx     Strabismus Neg Hx        Social History     Socioeconomic History    Marital status:     Number of children: 0    Highest education level: Master's degree (e.g., MA, MS, Lelo, MEd, MSW, BANDAR)   Occupational History    Occupation: teacher     Comment: retired   Tobacco Use    Smoking status: Never    Smokeless tobacco: Never   Substance and Sexual Activity    Alcohol use: No     Alcohol/week: 0.0 standard drinks of alcohol    Drug use: No    Sexual activity: Not Currently     Birth control/protection: Abstinence   Social History Narrative    Single ()             Brother passed away last year.  Pt lives her her sister. (5/27)     Social Determinants of Health     Financial Resource Strain: Medium Risk (12/28/2023)    Overall Financial Resource Strain (CARDIA)     Difficulty of Paying Living Expenses: Somewhat hard   Food Insecurity: No Food Insecurity (12/28/2023)    Hunger Vital Sign     Worried About Running Out of Food in the Last Year: Never true     Ran Out of Food in the  "Last Year: Never true   Transportation Needs: Unmet Transportation Needs (12/28/2023)    PRAPARE - Transportation     Lack of Transportation (Medical): Yes     Lack of Transportation (Non-Medical): Yes   Physical Activity: Insufficiently Active (12/28/2023)    Exercise Vital Sign     Days of Exercise per Week: 3 days     Minutes of Exercise per Session: 20 min   Stress: No Stress Concern Present (12/28/2023)    [employer] - Occupational Stress Questionnaire     Feeling of Stress : Not at all   Social Connections: Moderately Integrated (12/28/2023)    Social Connection and Isolation Panel [NHANES]     Frequency of Communication with Friends and Family: More than three times a week     Frequency of Social Gatherings with Friends and Family: Never     Attends Hoahaoism Services: More than 4 times per year     Active Member of Clubs or Organizations: Yes     Attends Club or Organization Meetings: Never     Marital Status:    Housing Stability: Low Risk  (12/28/2023)    Housing Stability Vital Sign     Unable to Pay for Housing in the Last Year: No     Number of Places Lived in the Last Year: 1     Unstable Housing in the Last Year: No       Allergies:  Patient has no known allergies.    Medications:    Current Outpatient Medications:     amLODIPine (NORVASC) 10 MG tablet, Take 1 tablet (10 mg total) by mouth once daily., Disp: 90 tablet, Rfl: 3    anastrozole (ARIMIDEX) 1 mg Tab, TAKE 1 TABLET BY MOUTH EVERY DAY, Disp: 90 tablet, Rfl: 2    [START ON 2/17/2024] apixaban (ELIQUIS) 5 mg Tab, Take 1 tablet (5 mg total) by mouth Daily., Disp: , Rfl:     atorvastatin (LIPITOR) 80 MG tablet, Take 1 tablet (80 mg total) by mouth once daily., Disp: 90 tablet, Rfl: 3    BD INSULIN SYRINGE ULTRA-FINE 0.5 mL 31 gauge x 5/16" Syrg, USE WITH INSULIN 4 TIMES A DAY, Disp: 100 each, Rfl: 12    calcium acetate,phosphat bind, (PHOSLO) 667 mg capsule, Take by mouth 3 (three) times daily., Disp: , Rfl: "     calcium acetate,phosphat bind, (PHOSLO) 667 mg tablet, Take 1 tablet (667 mg total) by mouth 3 (three) times daily with meals., Disp: 90 tablet, Rfl: 11    carvediloL (COREG) 6.25 MG tablet, Take 1 tablet (6.25 mg total) by mouth 2 (two) times daily., Disp: 60 tablet, Rfl: 11    DULoxetine (CYMBALTA) 20 MG capsule, Take 2 capsules (40 mg total) by mouth once daily., Disp: 180 capsule, Rfl: 1    erenumab-aooe (AIMOVIG AUTOINJECTOR) 140 mg/mL autoinjector, 140 mg MONTHLY (route: subcutaneous), Disp: , Rfl:     ergocalciferol (ERGOCALCIFEROL) 50,000 unit Cap, TAKE ONE CAPSULE BY MOUTH ONE TIME PER WEEK, TAKES ON TUESDAYS, Disp: 12 capsule, Rfl: 3    furosemide (LASIX) 40 MG tablet, Take 1 tablet (40 mg total) by mouth once daily., Disp: 30 tablet, Rfl: 11    GAVILYTE-C 240-22.72-6.72 -5.84 gram SolR, Take 4,000 mLs by mouth once., Disp: , Rfl:     [START ON 2/16/2024] HYDROcodone-acetaminophen (NORCO)  mg per tablet, Take 1 tablet by mouth every 6 (six) hours as needed for Pain., Disp: 120 tablet, Rfl: 0    insulin (LANTUS SOLOSTAR U-100 INSULIN) glargine 100 units/mL SubQ pen, INJECT 14-15 UNITS UNDER THE SKIN ONCE DAILY (Patient taking differently: 15 Units every evening. INJECT 14-16 UNITS UNDER THE SKIN ONCE DAILY), Disp: 15 each, Rfl: 3    isosorbide mononitrate (IMDUR) 30 MG 24 hr tablet, Take 1 tablet (30 mg total) by mouth once daily., Disp: 30 tablet, Rfl: 11    magnesium oxide (MAG-OX) 400 mg (241.3 mg magnesium) tablet, Take 1 tablet (400 mg total) by mouth once daily. (Patient not taking: Reported on 2/8/2024), Disp: 30 tablet, Rfl: 2    methocarbamoL (ROBAXIN) 750 MG Tab, TAKE 1-2 TABLETS (750-1,500 MG TOTAL) BY MOUTH 3 (THREE) TIMES DAILY AS NEEDED (MUSCLE SPASMS)., Disp: 180 tablet, Rfl: 1    NIFEdipine (PROCARDIA-XL) 90 MG (OSM) 24 hr tablet, Take 1 tablet (90 mg total) by mouth once daily., Disp: 30 tablet, Rfl: 11    oxybutynin (DITROPAN-XL) 10 MG 24 hr tablet, TAKE 1 TABLET BY MOUTH EVERY  "DAY, Disp: 90 tablet, Rfl: 4    oxyCODONE (ROXICODONE) 5 MG immediate release tablet, Take 1 tablet (5 mg total) by mouth every 4 (four) hours as needed for Pain., Disp: 20 tablet, Rfl: 0    pen needle, diabetic (BD ULTRA-FINE SHORT PEN NEEDLE) 31 gauge x 5/16" Ndle, USE WITH LANTUS DAILY, e 11.65, Disp: 100 each, Rfl: 3    sumatriptan (IMITREX) 100 MG tablet, Take 1 tablet (100 mg total) by mouth 2 (two) times daily as needed for Migraine., Disp: 30 tablet, Rfl: 1    SYNTHROID 50 mcg tablet, TAKE 1 TABLET BY MOUTH BEFORE BREAKFAST. ADMINISTER ON AN EMPTY STOMACH AT LEAST 30 MINUTES BEFORE FOOD. IF RECEIVING TUBE FEEDS, HOLD TUBE FEEDS FOR 1 HOUR BEFORE AND AFTER LEVOTHYROXINE ADMINISTRATION., Disp: 90 tablet, Rfl: 2    traZODone (DESYREL) 100 MG tablet, TAKE 1 TABLET BY MOUTH NIGHTLY AS NEEDED FOR INSOMNIA., Disp: 90 tablet, Rfl: 2  No current facility-administered medications for this visit.    Facility-Administered Medications Ordered in Other Visits:     heparin (porcine) injection 2,000 Units, 2,000 Units, Intravenous, Once, Emmanuel Hare Jr., MD    PHYSICAL EXAMINATION:  Physical Exam  Vitals reviewed.   Constitutional:       General: She is awake. She is not in acute distress.     Appearance: Normal appearance. She is well-developed. She is not toxic-appearing.   HENT:      Head: Normocephalic and atraumatic.      Right Ear: External ear normal.      Left Ear: External ear normal.      Nose: Nose normal.   Eyes:      General: Lids are normal.         Right eye: No discharge.         Left eye: No discharge.      Conjunctiva/sclera: Conjunctivae normal.   Neck:      Trachea: Trachea normal.   Cardiovascular:      Rate and Rhythm: Normal rate.   Pulmonary:      Effort: Pulmonary effort is normal. No respiratory distress.   Abdominal:      General: There is no distension.      Palpations: Abdomen is soft.      Tenderness: There is no abdominal tenderness. There is no right CVA tenderness, left CVA " tenderness, guarding or rebound.       Musculoskeletal:         General: Normal range of motion.      Cervical back: Normal range of motion and neck supple.   Skin:     General: Skin is warm and dry.   Neurological:      General: No focal deficit present.      Mental Status: She is alert and oriented to person, place, and time.   Psychiatric:         Speech: Speech normal.         Behavior: Behavior normal. Behavior is cooperative.         Thought Content: Thought content normal.         Judgment: Judgment normal.           LABS:            Lab Results   Component Value Date    CREATININE 3.48 (H) 02/09/2024    EGFRNORACEVR 17.5 (A) 01/30/2024             IMPRESSION:    Encounter Diagnoses   Name Primary?    Neurogenic bladder Yes    Urinary retention     Chronic suprapubic catheter     Immobility     Encounter for care or replacement of suprapubic tube     ESRD on hemodialysis     History of breast cancer          Assessment:       1. Neurogenic bladder    2. Urinary retention    3. Chronic suprapubic catheter    4. Immobility    5. Encounter for care or replacement of suprapubic tube    6. ESRD on hemodialysis    7. History of breast cancer        Plan:           I spent 30 minutes with the patient of which more than half was spent in direct consultation with the patient in regards to our treatment and plan.    Education and recommendations of today's plan of care including home remedies.  Old SPT easily removed.   Stoma prepped with betadine.   New 16fr SPT placed using sterile technique  Urine received; Irrigated the bladder to verify the position.  Balloon inflated 9cc sterile water.  Connected to drainage bag.  Tolerated well  RTC 4 weeks; discussed importance of not waiting to long between SPT changes.   Will recheck with home health

## 2024-02-15 NOTE — ANESTHESIA POSTPROCEDURE EVALUATION
Anesthesia Post Evaluation    Patient: Cecile Bowen    Procedure(s) Performed: Procedure(s) (LRB):  CREATION, AV FISTULA (Left)    Final Anesthesia Type: general      Patient location during evaluation: PACU  Patient participation: Yes- Able to Participate  Level of consciousness: awake and alert  Post-procedure vital signs: reviewed and stable  Pain management: adequate  Airway patency: patent    PONV status: None or treated.  Anesthetic complications: no      Cardiovascular status: hemodynamically stable  Respiratory status: spontaneous ventilation  Hydration status: euvolemic  Follow-up not needed.          Vitals Value Taken Time   /72 02/14/24 1502   Temp 36.7 °C (98 °F) 02/14/24 1500   Pulse 78 02/14/24 1509   Resp 18 02/14/24 1500   SpO2 100 % 02/14/24 1509   Vitals shown include unvalidated device data.      No case tracking events are documented in the log.      Pain/Audrey Score: Pain Rating Prior to Med Admin: 7 (2/14/2024 12:01 PM)  Pain Rating Post Med Admin: 3 (2/14/2024  1:00 PM)  Audrey Score: 10 (2/14/2024  2:00 PM)

## 2024-02-15 NOTE — CARE UPDATE
**Unable to document on actual date and time for this update due to this encounter being closed once patient was discharged from system.     Thursday, February 15, 2024 at 10:44AM:    Patient's wheelchair was misplaced yesterday, 2/14/24 following her procedure. Patient was sent home via wheelchair from DME provided by unit's cost center. However, patient's personal wheelchair was located the morning of 2/15/24. Patient was called at 1044 to notify her of status of wheelchair and asked if patient had someone available to come collect personal wheelchair from [facility]. Patient asked if this meant she would have to give back the wheelchair [facility] provided her. It was explained that she was able to keep the wheelchair DME provided, but we would swap them out if she didn't want both. She said the wheelchair we discharged her home with fits her perfectly and she would rather just keep that one. It was reiterated that she could keep both, but patient insisted she did not need her personal wheelchair anymore. This decision was verified once more to be sure and patient stated she did not want her personal wheelchair now that she had the one from DME. Patient then proceeded to compliment the care she received during her visit at [facility] 2/14/24 and thanked RN on behalf of the unit. Patient's wheelchair will be removed from [facility] at this time.

## 2024-02-17 NOTE — PROGRESS NOTES
Outpatient Care Management  Plan of Care Follow Up Visit    Patient: Cecile Bowen  MRN: [id number]  Date of Service: 02/15/2024  Completed by: Danielle Woods RN  Referral Date: 02/08/2024    Reason for Visit   Patient presents with    OPCM RN Follow Up Call    OPCM Chart Review       Brief Summary:   OPCM RN follow-up call completed.   Continue education on Pressure Injury in chair bound patient.   Next Steps: Patient is in agreement to follow-up call on or around 2/19/2024.

## 2024-02-19 ENCOUNTER — OUTPATIENT CASE MANAGEMENT (OUTPATIENT)
Dept: ADMINISTRATIVE | Facility: OTHER | Age: 74
End: 2024-02-19
Payer: MEDICARE

## 2024-02-19 ENCOUNTER — TELEPHONE (OUTPATIENT)
Dept: INTERNAL MEDICINE | Facility: CLINIC | Age: 74
End: 2024-02-19
Payer: MEDICARE

## 2024-02-19 DIAGNOSIS — M47.816 LUMBAR SPONDYLOSIS: Primary | ICD-10-CM

## 2024-02-19 NOTE — PROGRESS NOTES
Outpatient Care Management  Plan of Care Follow Up Visit    Patient: Cecile Bowen  MRN: [id number]  Date of Service: 02/19/2024  Completed by: Danielle Woods RN  Referral Date: 02/08/2024    Reason for Visit   Patient presents with    OPCM RN Follow Up Call    OPCM Chart Review       Brief Summary:  OPCM RN follow-up call completed.   Continue education on Pressure Injury, Care Coordination.   Next Steps: Patient is in agreement to follow-up call on or around 2/20/2024.

## 2024-02-19 NOTE — TELEPHONE ENCOUNTER
----- Message from Ni Watson sent at 2/19/2024 12:51 PM CST -----  Contact: [phone]  [email or web address]     Patient is calling for Medical Advice regarding: pt needs annual appt     How long has patient had these symptoms:    Pharmacy name and phone#:    Would like response via [facility]: call back     Comments:  pt is in diallyls till 3 call after 3

## 2024-02-20 ENCOUNTER — OUTPATIENT CASE MANAGEMENT (OUTPATIENT)
Dept: ADMINISTRATIVE | Facility: OTHER | Age: 74
End: 2024-02-20
Payer: MEDICARE

## 2024-02-20 NOTE — PROGRESS NOTES
Outpatient Care Management   - High Risk Patient Assessment    Patient: Cecile Suazo  MRN:  [id number]  Date of Service:  2/20/2024  Completed by:  Tasha Renteria LCSW  Referral Date: 02/08/2024    Reason for Visit   Patient presents with    Social Work Assessment - High Risk       Brief Summary:  received a referral from [facility] STACEY [place] for the following patient identified psycho-social needs: Referred to [facility] SW to assist patient in reinstating her Medicaid. She has ESRD on HD M-F, reports having too much money in the bank when reapplying for Medicaid and was denied. The money has been used for a new car. Cecile suazo currently receives limitless transportation through her [employer] plan that will end June 30th when Medicaid ends. I don't truly understand the problem pt is having with Medicaid. She has food insecurities, does get the [employer] Healthy option card. Doesn't get [employer] [place] since her sister, Kat Sánchez living in the same household does get [employer] [place]. They only have each other for support. A neighbor may help sometimes. Pt is chair bound, has pressure ulcer to buttocks Stg 1 it sounds . Care plan was created in collaboration with patient/caregiver input.

## 2024-02-21 ENCOUNTER — OFFICE VISIT (OUTPATIENT)
Dept: HOME HEALTH SERVICES | Facility: CLINIC | Age: 74
End: 2024-02-21
Payer: MEDICARE

## 2024-02-21 ENCOUNTER — OUTPATIENT CASE MANAGEMENT (OUTPATIENT)
Dept: ADMINISTRATIVE | Facility: OTHER | Age: 74
End: 2024-02-21
Payer: MEDICARE

## 2024-02-21 VITALS
OXYGEN SATURATION: 98 % | SYSTOLIC BLOOD PRESSURE: 138 MMHG | HEIGHT: 68 IN | TEMPERATURE: 97 F | HEART RATE: 88 BPM | DIASTOLIC BLOOD PRESSURE: 60 MMHG | BODY MASS INDEX: 33.05 KG/M2 | WEIGHT: 218.06 LBS | RESPIRATION RATE: 16 BRPM

## 2024-02-21 DIAGNOSIS — Z17.0 MALIGNANT NEOPLASM OF AXILLARY TAIL OF LEFT BREAST IN FEMALE, ESTROGEN RECEPTOR POSITIVE: Chronic | ICD-10-CM

## 2024-02-21 DIAGNOSIS — E11.22 TYPE 2 DIABETES MELLITUS WITH STAGE 4 CHRONIC KIDNEY DISEASE, WITH LONG-TERM CURRENT USE OF INSULIN: Chronic | ICD-10-CM

## 2024-02-21 DIAGNOSIS — Z00.00 ENCOUNTER FOR PREVENTIVE HEALTH EXAMINATION: Primary | ICD-10-CM

## 2024-02-21 DIAGNOSIS — E03.9 ACQUIRED HYPOTHYROIDISM: Chronic | ICD-10-CM

## 2024-02-21 DIAGNOSIS — Z99.3 DEPENDENCE ON WHEELCHAIR: ICD-10-CM

## 2024-02-21 DIAGNOSIS — E11.311 TYPE 2 DIABETES MELLITUS WITH RETINOPATHY AND MACULAR EDEMA, WITH LONG-TERM CURRENT USE OF INSULIN, UNSPECIFIED LATERALITY, UNSPECIFIED RETINOPATHY SEVERITY: ICD-10-CM

## 2024-02-21 DIAGNOSIS — Z99.2 DIALYSIS PATIENT: ICD-10-CM

## 2024-02-21 DIAGNOSIS — I15.2 OBESITY, DIABETES, AND HYPERTENSION SYNDROME: ICD-10-CM

## 2024-02-21 DIAGNOSIS — K76.0 FATTY LIVER DISEASE, NONALCOHOLIC: ICD-10-CM

## 2024-02-21 DIAGNOSIS — G47.00 INSOMNIA, UNSPECIFIED TYPE: ICD-10-CM

## 2024-02-21 DIAGNOSIS — M79.7 FIBROMYALGIA: Chronic | ICD-10-CM

## 2024-02-21 DIAGNOSIS — I73.9 PVD (PERIPHERAL VASCULAR DISEASE): ICD-10-CM

## 2024-02-21 DIAGNOSIS — N18.4 CKD (CHRONIC KIDNEY DISEASE) STAGE 4, GFR 15-29 ML/MIN: Chronic | ICD-10-CM

## 2024-02-21 DIAGNOSIS — G47.33 OSA (OBSTRUCTIVE SLEEP APNEA): Chronic | ICD-10-CM

## 2024-02-21 DIAGNOSIS — C50.612 MALIGNANT NEOPLASM OF AXILLARY TAIL OF LEFT BREAST IN FEMALE, ESTROGEN RECEPTOR POSITIVE: Chronic | ICD-10-CM

## 2024-02-21 DIAGNOSIS — E78.5 HYPERLIPIDEMIA ASSOCIATED WITH TYPE 2 DIABETES MELLITUS: Chronic | ICD-10-CM

## 2024-02-21 DIAGNOSIS — I70.0 AORTIC ATHEROSCLEROSIS: ICD-10-CM

## 2024-02-21 DIAGNOSIS — I48.91 ATRIAL FIBRILLATION, UNSPECIFIED TYPE: ICD-10-CM

## 2024-02-21 DIAGNOSIS — E55.9 VITAMIN D DEFICIENCY DISEASE: ICD-10-CM

## 2024-02-21 DIAGNOSIS — R26.9 ABNORMALITY OF GAIT AND MOBILITY: ICD-10-CM

## 2024-02-21 DIAGNOSIS — Z79.4 TYPE 2 DIABETES MELLITUS WITH STAGE 4 CHRONIC KIDNEY DISEASE, WITH LONG-TERM CURRENT USE OF INSULIN: Chronic | ICD-10-CM

## 2024-02-21 DIAGNOSIS — E11.59 HYPERTENSION ASSOCIATED WITH DIABETES: Chronic | ICD-10-CM

## 2024-02-21 DIAGNOSIS — M85.80 OSTEOPENIA, UNSPECIFIED LOCATION: ICD-10-CM

## 2024-02-21 DIAGNOSIS — E11.42 DIABETIC POLYNEUROPATHY ASSOCIATED WITH TYPE 2 DIABETES MELLITUS: ICD-10-CM

## 2024-02-21 DIAGNOSIS — E11.69 OBESITY, DIABETES, AND HYPERTENSION SYNDROME: ICD-10-CM

## 2024-02-21 DIAGNOSIS — D64.3 SIDEROBLASTIC ANEMIA: ICD-10-CM

## 2024-02-21 DIAGNOSIS — N11.1 CHRONIC OBSTRUCTIVE PYELONEPHRITIS: ICD-10-CM

## 2024-02-21 DIAGNOSIS — N18.4 TYPE 2 DIABETES MELLITUS WITH STAGE 4 CHRONIC KIDNEY DISEASE, WITH LONG-TERM CURRENT USE OF INSULIN: Chronic | ICD-10-CM

## 2024-02-21 DIAGNOSIS — G89.29 OTHER CHRONIC PAIN: Chronic | ICD-10-CM

## 2024-02-21 DIAGNOSIS — Z79.4 TYPE 2 DIABETES MELLITUS WITH RETINOPATHY AND MACULAR EDEMA, WITH LONG-TERM CURRENT USE OF INSULIN, UNSPECIFIED LATERALITY, UNSPECIFIED RETINOPATHY SEVERITY: ICD-10-CM

## 2024-02-21 DIAGNOSIS — Z93.59 CHRONIC SUPRAPUBIC CATHETER: ICD-10-CM

## 2024-02-21 DIAGNOSIS — E66.09 CLASS 1 OBESITY DUE TO EXCESS CALORIES WITH SERIOUS COMORBIDITY AND BODY MASS INDEX (BMI) OF 33.0 TO 33.9 IN ADULT: ICD-10-CM

## 2024-02-21 DIAGNOSIS — E66.9 OBESITY, DIABETES, AND HYPERTENSION SYNDROME: ICD-10-CM

## 2024-02-21 DIAGNOSIS — E11.69 HYPERLIPIDEMIA ASSOCIATED WITH TYPE 2 DIABETES MELLITUS: Chronic | ICD-10-CM

## 2024-02-21 DIAGNOSIS — E11.59 OBESITY, DIABETES, AND HYPERTENSION SYNDROME: ICD-10-CM

## 2024-02-21 DIAGNOSIS — F11.20 UNCOMPLICATED OPIOID DEPENDENCE: ICD-10-CM

## 2024-02-21 DIAGNOSIS — L89.313 PRESSURE INJURY OF RIGHT BUTTOCK, STAGE 3: ICD-10-CM

## 2024-02-21 DIAGNOSIS — F33.1 MAJOR DEPRESSIVE DISORDER, RECURRENT, MODERATE: ICD-10-CM

## 2024-02-21 DIAGNOSIS — N25.81 SECONDARY HYPERPARATHYROIDISM, RENAL: ICD-10-CM

## 2024-02-21 DIAGNOSIS — I15.2 HYPERTENSION ASSOCIATED WITH DIABETES: Chronic | ICD-10-CM

## 2024-02-21 PROCEDURE — 1159F MED LIST DOCD IN RCRD: CPT | Mod: CPTII,S$GLB,,

## 2024-02-21 PROCEDURE — 1101F PT FALLS ASSESS-DOCD LE1/YR: CPT | Mod: CPTII,S$GLB,,

## 2024-02-21 PROCEDURE — 3288F FALL RISK ASSESSMENT DOCD: CPT | Mod: CPTII,S$GLB,,

## 2024-02-21 PROCEDURE — 1170F FXNL STATUS ASSESSED: CPT | Mod: CPTII,S$GLB,,

## 2024-02-21 PROCEDURE — G0439 PPPS, SUBSEQ VISIT: HCPCS | Mod: S$GLB,,,

## 2024-02-21 PROCEDURE — 3066F NEPHROPATHY DOC TX: CPT | Mod: CPTII,S$GLB,,

## 2024-02-21 PROCEDURE — 3078F DIAST BP <80 MM HG: CPT | Mod: CPTII,S$GLB,,

## 2024-02-21 PROCEDURE — 1125F AMNT PAIN NOTED PAIN PRSNT: CPT | Mod: CPTII,S$GLB,,

## 2024-02-21 PROCEDURE — 3075F SYST BP GE 130 - 139MM HG: CPT | Mod: CPTII,S$GLB,,

## 2024-02-21 PROCEDURE — 1160F RVW MEDS BY RX/DR IN RCRD: CPT | Mod: CPTII,S$GLB,,

## 2024-02-21 NOTE — PROGRESS NOTES
"Cecile Bowen presented for a follow-up Medicare AWV today. The following components were reviewed and updated:    Medical history  Family History  Social history  Allergies and Current Medications  Health Risk Assessment  Health Maintenance  Care Team    **See Completed Assessments for Annual Wellness visit with in the encounter summary    The following assessments were completed:  Depression Screening  Cognitive function Screening    Timed Get Up Test: Deferred, impaired mobility  Whisper Test      Opioid documentation:      Patient does have a current opioid prescription.      Patient accepted further discussion regarding opioid medication use.      Patient is currently taking hydrocodone narcotic for back pain.        Pain level today is 7/10.       In addition to narcotic pain medications, patient is also using (no other oral, topical, or alternative treatments) for pain control.       Patient is followed by a specialist currently for their pain and will not be referred today.       Patient's opioid risk potential based on ORT-OUD tool:       Isaak each box that applies   No   Yes     Family history of substance abuse   Alcohol [x] []   Illegal drugs [x] []   Rx drugs [x] []     Personal history of substance abuse   Alcohol [x] []   Illegal drugs [x] []   Rx drugs [x] []     Age between 16-45 years   [x]   []     Patient with ADD, OCD, Bipolar disorder, schizoprenia   [x]   []     Patient with depression   [x]   []                         Scoring total                                                        0         Non-opioid treatment options have been discussed today and added to the patient's after visit summary.          Vitals:    02/21/24 1057   BP: 138/60   BP Location: Left arm   Patient Position: Sitting   Pulse: 88   Resp: 16   Temp: 97 °F (36.1 °C)   SpO2: 98%   Weight: 98.9 kg (218 lb 0.6 oz)   Height: 5' 8" (1.727 m)     Body mass index is 33.15 kg/m².       Physical Exam  Vitals reviewed. "   Constitutional:       General: She is not in acute distress.     Appearance: Normal appearance.   HENT:      Head: Normocephalic.   Cardiovascular:      Rate and Rhythm: Normal rate and regular rhythm.      Pulses: Normal pulses.      Heart sounds: Normal heart sounds.   Pulmonary:      Effort: Pulmonary effort is normal. No respiratory distress.      Breath sounds: Normal breath sounds. No wheezing.   Abdominal:      General: Bowel sounds are normal.      Tenderness: There is no abdominal tenderness.   Musculoskeletal:         General: Normal range of motion.      Cervical back: Normal range of motion.      Right lower leg: No edema.      Left lower leg: No edema.   Skin:     General: Skin is warm and dry.      Capillary Refill: Capillary refill takes less than 2 seconds.   Neurological:      General: No focal deficit present.      Mental Status: She is alert and oriented to person, place, and time.      Gait: Gait abnormal.   Psychiatric:         Mood and Affect: Mood normal.         Behavior: Behavior normal.           Diagnoses and health risks identified today and associated recommendations/orders:    1. Encounter for preventive health examination  Assessments completed.  HM recommendations reviewed.  F/u with PCP as instructed.      2. Chronic suprapubic catheter  Chronic, stable on current regimen. Followed by Urology.     3. Dialysis patient  Chronic, stable on current regimen. Followed by Dialysis.     4. Pressure injury of right buttock, stage 3  Chronic, stable on current regimen. Followed by PCP.     5. CKD (chronic kidney disease) stage 4, GFR 15-29 ml/min  Chronic, stable on current regimen. Followed by Dialysis.     6. Type 2 diabetes mellitus with stage 4 chronic kidney disease, with long-term current use of insulin  Chronic, stable on current Lantus regimen. Followed by PCP.   Lab Results   Component Value Date    HGBA1C 5.0 10/06/2023     7. Aortic atherosclerosis  Chronic, stable on current  statin regimen. Followed by PCP.     8. PVD (peripheral vascular disease)  Chronic, stable on current regimen. Followed by PCP.     9. Atrial fibrillation, unspecified type  Chronic, stable on current Eliquis regimen. Followed by Cardiology.     10. Major depressive disorder, recurrent, moderate  Chronic, stable on current regimen. Followed by PCP.     11. Uncomplicated opioid dependence  Chronic, stable on current Norco regimen. Followed by Dr. Stafford.     12. Sideroblastic anemia  Chronic, stable on current regimen. Followed by Dialysis.     13. Secondary hyperparathyroidism, renal  Chronic, stable on current regimen. Followed by Dialysis.     14. Hypertension associated with diabetes  Chronic, stable on current Amlodipine, Coreg regimen. Followed by PCP.     15. Hyperlipidemia associated with type 2 diabetes mellitus  Chronic, stable on current statin regimen. Followed by PCP.     16. Diabetic polyneuropathy associated with type 2 diabetes mellitus  Chronic, stable on current Cymbalta regimen. Followed by PCP.     17. Obesity, diabetes, and hypertension syndrome  Chronic, stable on current regimen. Followed by PCP.     18. Malignant neoplasm of axillary tail of left breast in female, estrogen receptor positive  Chronic, stable on current Arimidex regimen. Followed by Hem/Onc.     19. Other chronic pain  Chronic, stable on current Norco regimen. Followed by Dr. Stafford.     20. Fibromyalgia  Chronic, stable on current Norco regimen. Followed by Dr. Stafford.     21. Chronic obstructive pyelonephritis  Chronic, stable on current regimen. Followed by Dialysis.     22. Acquired hypothyroidism  Chronic, stable on current Synthroid regimen. Followed by PCP.     23. Vitamin D deficiency disease  Chronic, stable on current Vit D regimen. Followed by PCP.     24. Class 1 obesity due to excess calories with serious comorbidity and body mass index (BMI) of 33.0 to 33.9 in adult  Chronic, stable on current regimen. Followed  by PCP.    25. Fatty liver disease, nonalcoholic  Chronic, stable on current regimen. Followed by PCP.     26. Osteopenia, unspecified location  Chronic, stable on current regimen. Followed by PCP.     27. VIJAYA (obstructive sleep apnea)  Chronic, stable on current regimen. Followed by PCP.     28. Insomnia, unspecified type  Chronic, stable on current Trazodone regimen. Followed by PCP.     29. 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 Followed by PCP.     30. Dependence on wheelchair  Uses wheelchair.     31. Abnormality of gait and mobility  Unable to safely walk without assistance.       Provided Cecile with a 5-10 year written screening schedule and personal prevention plan. Recommendations were developed using the USPSTF age appropriate recommendations. Education, counseling, and referrals were provided as needed.  After Visit Summary printed and given to patient which includes a list of additional screenings\tests needed.    Follow up in about 1 year (around 2/21/2025) for Medicare AWV.      Emmanuelle Paul NP    I offered to discuss advanced care planning, including how to pick a person who would make decisions for you if you were unable to make them for yourself, called a health care power of , and what kind of decisions you might make such as use of life sustaining treatments such as ventilators and tube feeding when faced with a life limiting illness recorded on a living will that they will need to know. (How you want to be cared for as you near the end of your natural life)     X Patient is interested in learning more about how to make advanced directives.  I provided them paperwork and offered to discuss this with them.

## 2024-02-21 NOTE — PATIENT INSTRUCTIONS
Counseling and Referral of Other Preventative  (Italic type indicates deductible and co-insurance are waived)    Patient Name: Cecile Bowen  Today's Date: 2/21/2024    Health Maintenance       Date Due Completion Date    TETANUS VACCINE Never done ---    RSV Vaccine (Age 60+ and Pregnant patients) (1 - 1-dose 60+ series) Never done ---    Shingles Vaccine (1 of 2) 02/10/2014 12/16/2013    DEXA Scan 10/29/2022 10/29/2019    Eye Exam 08/22/2023 8/22/2022    COVID-19 Vaccine (6 - 2023-24 season) 09/01/2023 12/24/2021    Mammogram 03/08/2024 3/8/2023    Hemoglobin A1c 05/07/2024 11/7/2023    Lipid Panel 08/21/2024 8/21/2023    Foot Exam 10/26/2024 10/26/2023    Override on 1/30/2023: Done    Override on 10/11/2019: Done    Override on 9/27/2017: Done    Override on 10/6/2016: Done    Override on 9/19/2016: Done    Colorectal Cancer Screening 12/07/2028 12/7/2023        No orders of the defined types were placed in this encounter.    The following information is provided to all patients.  This information is to help you find resources for any of the problems found today that may be affecting your health:                  Living healthy guide: www.[facility].[place].gov      Understanding Diabetes: www.diabetes.org      Eating healthy: www.cdc.gov/healthyweight      CDC home safety checklist: www.cdc.gov/steadi/patient.html      Agency on Aging: www.goea.[place].gov      Alcoholics anonymous (AA): www.aa.org      Physical Activity: www.zenon.nih.gov/be2dkih      Tobacco use: www.quitwithusla.org

## 2024-02-22 ENCOUNTER — PATIENT MESSAGE (OUTPATIENT)
Dept: ADMINISTRATIVE | Facility: OTHER | Age: 74
End: 2024-02-22
Payer: MEDICARE

## 2024-02-22 ENCOUNTER — OUTPATIENT CASE MANAGEMENT (OUTPATIENT)
Dept: ADMINISTRATIVE | Facility: OTHER | Age: 74
End: 2024-02-22
Payer: MEDICARE

## 2024-02-22 ENCOUNTER — PATIENT MESSAGE (OUTPATIENT)
Dept: PODIATRY | Facility: CLINIC | Age: 74
End: 2024-02-22
Payer: MEDICARE

## 2024-02-22 ENCOUNTER — TELEPHONE (OUTPATIENT)
Dept: VASCULAR SURGERY | Facility: CLINIC | Age: 74
End: 2024-02-22
Payer: MEDICARE

## 2024-02-22 NOTE — PROGRESS NOTES
Outpatient Care Management  Plan of Care Follow Up Visit    Patient: Cecile Bowen  MRN: [id number]  Date of Service: 02/20/2024  Completed by: Danielle Woods RN  Referral Date: 02/08/2024    Reason for Visit   Patient presents with    OPCM RN Follow Up Call    OPCM Chart Review       Brief Summary:   OPCM RN follow-up call completed.   Continue education on Pressure Injuries/Prevention. Coordinate with [facility].   Next Steps: Patient is in agreement to follow-up call on or around 2/28/2024.

## 2024-02-22 NOTE — TELEPHONE ENCOUNTER
Contacted pt to schedule FU with Dr. Friedman. Appointment scheduled, pt verified. Appointment letter placed in mail.

## 2024-02-26 ENCOUNTER — TELEPHONE (OUTPATIENT)
Dept: VASCULAR SURGERY | Facility: CLINIC | Age: 74
End: 2024-02-26
Payer: MEDICARE

## 2024-02-26 NOTE — TELEPHONE ENCOUNTER
Contacted pt in response to message. Pt states dressing has been changed twice since surgery but she would like to know if dressing can be removed. Notified pt that dressing can be removed and left open to air. Pt verbalized understanding. ----- Message from Michelle Claros sent at 2/26/2024  9:26 AM CST -----  Regarding: Advise  Contact: [phone]  DANYELLE FARRIS calling regarding Patient Advice (message) for #  pt is asking if she can remove her bandage from her procedure that was done on 2/14/24 that was done on her arm pt would like to know before 11am

## 2024-02-27 ENCOUNTER — PATIENT MESSAGE (OUTPATIENT)
Dept: ADMINISTRATIVE | Facility: OTHER | Age: 74
End: 2024-02-27
Payer: MEDICARE

## 2024-02-27 ENCOUNTER — OUTPATIENT CASE MANAGEMENT (OUTPATIENT)
Dept: ADMINISTRATIVE | Facility: OTHER | Age: 74
End: 2024-02-27
Payer: MEDICARE

## 2024-02-27 ENCOUNTER — OFFICE VISIT (OUTPATIENT)
Dept: NEUROLOGY | Facility: CLINIC | Age: 74
End: 2024-02-27
Payer: MEDICARE

## 2024-02-27 VITALS
BODY MASS INDEX: 33.34 KG/M2 | SYSTOLIC BLOOD PRESSURE: 117 MMHG | HEIGHT: 68 IN | DIASTOLIC BLOOD PRESSURE: 71 MMHG | WEIGHT: 220 LBS

## 2024-02-27 DIAGNOSIS — G43.711 INTRACTABLE CHRONIC MIGRAINE WITHOUT AURA AND WITH STATUS MIGRAINOSUS: ICD-10-CM

## 2024-02-27 PROCEDURE — 1125F AMNT PAIN NOTED PAIN PRSNT: CPT | Mod: HCNC,CPTII,S$GLB, | Performed by: PSYCHIATRY & NEUROLOGY

## 2024-02-27 PROCEDURE — 3066F NEPHROPATHY DOC TX: CPT | Mod: HCNC,CPTII,S$GLB, | Performed by: PSYCHIATRY & NEUROLOGY

## 2024-02-27 PROCEDURE — 3074F SYST BP LT 130 MM HG: CPT | Mod: HCNC,CPTII,S$GLB, | Performed by: PSYCHIATRY & NEUROLOGY

## 2024-02-27 PROCEDURE — 3008F BODY MASS INDEX DOCD: CPT | Mod: HCNC,CPTII,S$GLB, | Performed by: PSYCHIATRY & NEUROLOGY

## 2024-02-27 PROCEDURE — 3078F DIAST BP <80 MM HG: CPT | Mod: HCNC,CPTII,S$GLB, | Performed by: PSYCHIATRY & NEUROLOGY

## 2024-02-27 PROCEDURE — 1159F MED LIST DOCD IN RCRD: CPT | Mod: HCNC,CPTII,S$GLB, | Performed by: PSYCHIATRY & NEUROLOGY

## 2024-02-27 PROCEDURE — 99214 OFFICE O/P EST MOD 30 MIN: CPT | Mod: HCNC,S$GLB,, | Performed by: PSYCHIATRY & NEUROLOGY

## 2024-02-27 PROCEDURE — 99999 PR PBB SHADOW E&M-EST. PATIENT-LVL II: CPT | Mod: PBBFAC,HCNC,, | Performed by: PSYCHIATRY & NEUROLOGY

## 2024-02-27 PROCEDURE — 1160F RVW MEDS BY RX/DR IN RCRD: CPT | Mod: HCNC,CPTII,S$GLB, | Performed by: PSYCHIATRY & NEUROLOGY

## 2024-02-27 RX ORDER — SUMATRIPTAN SUCCINATE 100 MG/1
100 TABLET ORAL 2 TIMES DAILY PRN
Qty: 30 TABLET | Refills: 1 | Status: SHIPPED | OUTPATIENT
Start: 2024-02-27 | End: 2024-04-17

## 2024-02-27 RX ORDER — ERENUMAB-AOOE 140 MG/ML
INJECTION, SOLUTION SUBCUTANEOUS
Qty: 1 EACH | Refills: 11 | Status: SHIPPED | OUTPATIENT
Start: 2024-02-27

## 2024-02-27 NOTE — PROGRESS NOTES
Procedures   [facility] - NEUROLOGY  [facility], [place]    Date: February 27, 2024   Patient Name: Cecile Bowen   MRN: [id number]   PCP: Lurdes Navarro  Referring Provider: No ref. provider found    Assessment:      This is Cecile Bowen, 73 y.o. female with chronic migraines (G43.719) and suffers from headaches more than 15 days a month lasting more than 4 hours a day with no relief of symptoms despite trying multiple medications listed below. Botox treatment was approved for chronic migraines in October 2010.  We are planning for 3 treatments 3 months apart and aiming for at least 50% improvement in the symptoms. If we see no improvement after 3 treatments, we will discontinue the injections. Plan to inject as follows:     muscle bilaterally ( a total of 10 units divided into 2 sites)   Procerus muscle (5 units)   Frontalis muscle bilaterally (a total of 20 units divided into 4 sites)   Temporalis muscle bilaterally (a total of 40 units divided into 8 sites)   Occipitalis muscle bilaterally (a total of 30 units divided into 6 sites)   Cervical paraspinal muscles (a total of 20 units divided into 4 sites)   Trapezius muscle bilaterally (a total of 30 units divided into 6 sites)      Plan:      1, Continue Aimovig  2, Breakthrough headache - Imitrex, tylenol, ubrelvy  3. Resume botox      Greater than 30 minutes spent in chart review, documentation, independent review of imaging labs and ancillary studies, and face to face time with patient       I discussed side effects of the medications. I asked the patient to stop the medication if She notices serious adverse effects as we discussed and to seek immediate medical attention at an ER.     Enrique Henao MD  [facility]   Department of Neurology    Subjective:     -  Reduced severity but not frequency with Aimovig with ongoing daily HA    8/2022  - HA worsened early this year and now present  daily, benefit from immitrex but runs out  - Currently wheelchair bound due to deconditioning from recurrent hospitalizations, acknowledges medication non-compliance due to complex regiment but recently started home health  5/2020  -  Continues to note some benefit of aimovig with ongoing daily headache  -  Recent stressor of finding brother dead in their home  1/2020  Improved severity with same near daily frequency of HA with aimovig, benefit of imitrex and robaxin but not flexeril, some improvement in HA with recent improvement in BP   12/2017  Continues with headaches most days but are markedly less severe, may go 1-2 weeks without excedrin, continued relief from imitrex.  Had a pleasant [place] with family.   8/2017  No benefit from GBP, only 2 HA free days per month but feels generally less severe - takes excedrin migraine 1-2 tabs daily  5/2017  Continued daily headache, stopped fioricet, continued relief from imitrex, unable to comply with CPAP due to severe distress caused by objects around her face related to childhood molesation     HPI:   Ms. Cecile Bowen is a 73 y.o. female who presents with a chief complaint of headache    Headache history:   Age of onset - Teens   Location - Bioccipital goes to crown   Nature of pain - Throbbing   Prodrome - no   Aura - No   Duration of headache - hrs   Time to peak intensity - 1 hr   Pain scale - range of intensity - 7-8/10   Frequency - 4/week , now 5/month   Status Migrainosus history - yes   Time of day of most headaches- anytime   Associated symptoms with the headache:   Meningeal symptoms - photophobia, phonophobia, exercise intolerance +   Nausea/vomitting +   Nasal drainage   Visual blurriness   Pallor/flushing   Dizziness +   Vertigo   Confusion   Impaired concentration +   Pain worsened with physical activity +   Neck pain +   Tension HA:   Location - Bifrontal   Nature of pain - Gripping   Prodrome - no   Aura - No   Duration of headache - hrs   Time  to peak intensity - 1 hr   Pain scale - range of intensity - 6/10   Frequency - daily   Time of day of most headaches- morning   Associated symptoms with the headache: none   Headache Triggers: Heat (hot weather, hot baths or showers) , emotional stress +   Weather change +   Other comorbid conditions:   Anxiety - yes   Motion sickness symptom - yes   Treatment history:   Resolution of headache with sleep - yes   Meds tried:   Fioricet - helps   Imitrex - helps   No headache benefit from:  Elavil, effexor, namenda, Mg, robaxin, flexeril, norco, unable to take NSAIDs due to renal failure  topamax - helped with migraine      Headache risk factors:   H/o TBI - no   H/o Meningitis - no   F/h Aneurysms - no   Takes naps during the day   Denies refreshing sleep   Snoring +     PAST MEDICAL HISTORY:  Past Medical History:   Diagnosis Date    Cervicogenic migraine     Chronic pain     CKD (chronic kidney disease) stage 4, GFR 15-29 ml/min     [facility]    CKD (chronic kidney disease) stage 4, GFR 15-29 ml/min     Diabetes mellitus     Long term use of Insulin, Diabetic Neuropathy    Dialysis patient 1/21/2022    Fibromyalgia     Hydronephrosis     Hyperlipidemia     Hypertension 12/12/2012    Hypothyroidism 12/12/2012    ARON (iron deficiency anemia)     Insomnia     Levoscoliosis     Malignant neoplasm of upper-outer quadrant of left breast in female, estrogen receptor positive     Metabolic acidosis     Mobility impaired     Nuclear sclerosis - Both Eyes 3/24/2014    VIJAYA (obstructive sleep apnea)     Osteopenia     Pulmonary nodule     Recurrent UTI     Renal manifestation of secondary diabetes mellitus     Secondary hyperparathyroidism, renal     Urinary retention        PAST SURGICAL HISTORY:  Past Surgical History:   Procedure Laterality Date    AV FISTULA PLACEMENT Left 2/14/2024    Procedure: CREATION, AV FISTULA;  Surgeon: RODRIGO Friedman III, MD;  Location: [facility] OR [address] [place] [address];  Service: Vascular;  Laterality: Left;   LUE AVF creation vs AVG insertion    BIOPSY OF URETER Left 2/18/2022    Procedure: BIOPSY, URETER;  Surgeon: Ronel Whittington MD;  Location: [facility] OR 1ST FLR;  Service: Urology;  Laterality: Left;    BREAST BIOPSY Right     benign    CHOLECYSTECTOMY      COLONOSCOPY N/A 1/13/2017    Procedure: COLONOSCOPY;  Surgeon: Morris Wiseman MD;  Location: [facility] ENDO (4TH FLR);  Service: Endoscopy;  Laterality: N/A;  Renal pt Nephrology advised to avoid phosphate preps    COLONOSCOPY N/A 12/7/2023    Procedure: COLONOSCOPY;  Surgeon: Herve Allen MD;  Location: [facility] ENDO (2ND FLR);  Service: Endoscopy;  Laterality: N/A;  HD MWF; labs prior  suprapubic catheter  pt does not ambulate-uses christina lift- will have sling with her  9/7 ref. by Anastasiia Campbell DO, PEG, instr. mailed, Eliquis hold approval pending-[facility] to hold Eliquis 2 days per Dr Navarro-GT  1/30-precall complete-MS    CYSTOSCOPY N/A 10/8/2018    Procedure: CYSTOSCOPY;  Surgeon: Ronel Whittington MD;  [facility];  Service: Urology;  Laterality: N/A;  45 min    CYSTOSCOPY N/A 3/25/2019    Procedure: CYSTOSCOPY;  Surgeon: Ronel Whittington MD;  [facility];  Service: Urology;  Laterality: N/A;  45 min    CYSTOSCOPY N/A 8/26/2019    Procedure: CYSTOSCOPY;  Surgeon: Ronel Whittington MD;  [facility];  Service: Urology;  Laterality: N/A;  45 min    CYSTOSCOPY N/A 7/2/2021    Procedure: CYSTOSCOPY;  Surgeon: Ronel Whittington MD;  [facility];  Service: Urology;  Laterality: N/A;    CYSTOSCOPY  12/23/2021    Procedure: CYSTOSCOPY;  Surgeon: Ronel Whittington MD;  [facility];  Service: Urology;;    CYSTOSCOPY  2/18/2022    Procedure: CYSTOSCOPY;  Surgeon: Ronel Whittington MD;  Location: [facility] OR [address] [place] [address];  Service: Urology;;    CYSTOSCOPY W/ URETERAL STENT PLACEMENT Left 5/15/2021    Procedure: CYSTOSCOPY, WITH URETERAL STENT INSERTION;  Surgeon:  Levy Sánchez Jr., MD;  Location: [facility] OR [address] [place] [address];  Service: Urology;  Laterality: Left;    CYSTOSCOPY WITH BIOPSY OF BLADDER N/A 1/27/2020    Procedure: CYSTOSCOPY, WITH BLADDER BIOPSY, WITH FULGURATION IF INDICATED;  Surgeon: Ronel Whittington MD;  Location: [facility] OR [address] [place] [address];  Service: Urology;  Laterality: N/A;    DILATION AND CURETTAGE OF UTERUS      GALLBLADDER SURGERY  2006    HYSTERECTOMY      INJECTION FOR SENTINEL NODE IDENTIFICATION Left 8/1/2019    Procedure: INJECTION, FOR SENTINEL NODE IDENTIFICATION;  Surgeon: Samia Fulton MD;  Location: [facility] OR [address] [place] [address];  Service: General;  Laterality: Left;    INJECTION OF BOTULINUM TOXIN TYPE A N/A 10/8/2018    Procedure: INJECTION, BOTULINUM TOXIN, TYPE A 300 UNITS;  Surgeon: Ronel Whittington MD;  Location: [facility] OR [address] [place] [address];  Service: Urology;  Laterality: N/A;    INJECTION OF BOTULINUM TOXIN TYPE A N/A 3/25/2019    Procedure: INJECTION, BOTULINUM TOXIN, TYPE A 300 UNITS;  Surgeon: Ronel Whittington MD;  Location: [address];  Service: Urology;  Laterality: N/A;    INJECTION OF BOTULINUM TOXIN TYPE A N/A 8/26/2019    Procedure: INJECTION, BOTULINUM TOXIN, TYPE A 300 UNITS;  Surgeon: Ronel Whittington MD;  Location: [facility] OR [address] [place] [address];  Service: Urology;  Laterality: N/A;    MASTECTOMY Left 8/1/2019    Procedure: MASTECTOMY 23 hour stay;  Surgeon: Samia Fulton MD;  Location: [facility] OR [address] [place] [address];  Service: General;  Laterality: Left;    MEDIPORT REMOVAL Right 6/24/2022    Procedure: REMOVAL, CATHETER, CENTRAL VENOUS, TUNNELED, WITH PORT;  Surgeon: Isauro Anderson MD;  Location: [facility] CATH LAB;  Service: Radiology;  Laterality: Right;    OVARIAN CYST SURGERY  1985    REPLACEMENT OF STENT Left 12/23/2021    Procedure: REPLACEMENT, STENT;  Surgeon: Ronel Whittington MD;  Location: [address];  Service: Urology;  Laterality: Left;    REPLACEMENT OF STENT Left 2/18/2022    Procedure: REPLACEMENT, STENT;  Surgeon: Ronel Whittington,  "MD;  Location: [facility] OR [address] [place] [address];  Service: Urology;  Laterality: Left;    RETROGRADE PYELOGRAPHY Left 2/18/2022    Procedure: PYELOGRAM, RETROGRADE;  Surgeon: Ronel Whittington MD;  Location: [facility] OR [address] [place] [address];  Service: Urology;  Laterality: Left;    SENTINEL LYMPH NODE BIOPSY Left 8/1/2019    Procedure: BIOPSY, LYMPH NODE, SENTINEL;  Surgeon: Samia Fulton MD;  Location: [facility] OR 2ND FLR;  Service: General;  Laterality: Left;    spt placement      TONSILLECTOMY, ADENOIDECTOMY      TOTAL ABDOMINAL HYSTERECTOMY W/ BILATERAL SALPINGOOPHORECTOMY  1985    URETEROSCOPY Left 2/18/2022    Procedure: URETEROSCOPY;  Surgeon: Ronel Whittington MD;  Location: [facility] OR [address] [place] [address];  Service: Urology;  Laterality: Left;       CURRENT MEDS:  Current Outpatient Medications   Medication Sig Dispense Refill    amLODIPine (NORVASC) 10 MG tablet Take 1 tablet (10 mg total) by mouth once daily. 90 tablet 3    anastrozole (ARIMIDEX) 1 mg Tab TAKE 1 TABLET BY MOUTH EVERY DAY 90 tablet 2    apixaban (ELIQUIS) 5 mg Tab Take 1 tablet (5 mg total) by mouth Daily.      atorvastatin (LIPITOR) 80 MG tablet Take 1 tablet (80 mg total) by mouth once daily. 90 tablet 3    BD INSULIN SYRINGE ULTRA-FINE 0.5 mL 31 gauge x 5/16" Syrg USE WITH INSULIN 4 TIMES A  each 12    calcium acetate,phosphat bind, (PHOSLO) 667 mg capsule Take by mouth 3 (three) times daily.      calcium acetate,phosphat bind, (PHOSLO) 667 mg tablet Take 1 tablet (667 mg total) by mouth 3 (three) times daily with meals. 90 tablet 11    carvediloL (COREG) 6.25 MG tablet Take 1 tablet (6.25 mg total) by mouth 2 (two) times daily. 60 tablet 11    DULoxetine (CYMBALTA) 20 MG capsule Take 2 capsules (40 mg total) by mouth once daily. 180 capsule 1    erenumab-aooe (AIMOVIG AUTOINJECTOR) 140 mg/mL autoinjector 140 mg MONTHLY (route: subcutaneous)  MUST ATTEND 2/27/24 APPOINTMENT FOR FURTHER REFILLS 1 each 0    ergocalciferol (ERGOCALCIFEROL) 50,000 unit Cap TAKE ONE CAPSULE BY " "MOUTH ONE TIME PER WEEK, TAKES ON TUESDAYS 12 capsule 3    furosemide (LASIX) 40 MG tablet Take 1 tablet (40 mg total) by mouth once daily. 30 tablet 11    HYDROcodone-acetaminophen (NORCO)  mg per tablet Take 1 tablet by mouth every 6 (six) hours as needed for Pain. 120 tablet 0    insulin (LANTUS SOLOSTAR U-100 INSULIN) glargine 100 units/mL SubQ pen INJECT 14-15 UNITS UNDER THE SKIN ONCE DAILY (Patient taking differently: 15 Units every evening. INJECT 14-16 UNITS UNDER THE SKIN ONCE DAILY) 15 each 3    magnesium oxide (MAG-OX) 400 mg (241.3 mg magnesium) tablet Take 1 tablet (400 mg total) by mouth once daily. 30 tablet 2    methocarbamoL (ROBAXIN) 750 MG Tab TAKE 1-2 TABLETS (750-1,500 MG TOTAL) BY MOUTH 3 (THREE) TIMES DAILY AS NEEDED (MUSCLE SPASMS). 180 tablet 1    NIFEdipine (PROCARDIA-XL) 90 MG (OSM) 24 hr tablet Take 1 tablet (90 mg total) by mouth once daily. 30 tablet 11    oxybutynin (DITROPAN-XL) 10 MG 24 hr tablet TAKE 1 TABLET BY MOUTH EVERY DAY 90 tablet 4    pen needle, diabetic (BD ULTRA-FINE SHORT PEN NEEDLE) 31 gauge x 5/16" Ndle USE WITH LANTUS DAILY, e 11.65 100 each 3    sumatriptan (IMITREX) 100 MG tablet Take 1 tablet (100 mg total) by mouth 2 (two) times daily as needed for Migraine. 30 tablet 1    SYNTHROID 50 mcg tablet TAKE 1 TABLET BY MOUTH BEFORE BREAKFAST. ADMINISTER ON AN EMPTY STOMACH AT LEAST 30 MINUTES BEFORE FOOD. IF RECEIVING TUBE FEEDS, HOLD TUBE FEEDS FOR 1 HOUR BEFORE AND AFTER LEVOTHYROXINE ADMINISTRATION. 90 tablet 2    traZODone (DESYREL) 100 MG tablet TAKE 1 TABLET BY MOUTH NIGHTLY AS NEEDED FOR INSOMNIA. 90 tablet 2     No current facility-administered medications for this visit.     Facility-Administered Medications Ordered in Other Visits   Medication Dose Route Frequency Provider Last Rate Last Admin    heparin (porcine) injection 2,000 Units  2,000 Units Intravenous Once Emmanuel Hare Jr., MD           ALLERGIES:  Review of patient's allergies " "indicates:  No Known Allergies    FAMILY HISTORY:  Family History   Problem Relation Age of Onset    Diabetes Sister     Kidney disease Sister         CKD III    ALS Mother         d.    Cancer Maternal Grandmother         d. colon    Cancer Paternal Grandfather         d. lung    Scoliosis Brother         increased pain    Prostate cancer Brother         cured s/p surgery    Cancer Brother         rare cancer that got into the bones    Diabetes Maternal Aunt     Kidney disease Maternal Aunt     Diabetes Maternal Uncle     Amblyopia Neg Hx     Blindness Neg Hx     Cataracts Neg Hx     Glaucoma Neg Hx     Macular degeneration Neg Hx     Retinal detachment Neg Hx     Strabismus Neg Hx        SOCIAL HISTORY:  Social History     Tobacco Use    Smoking status: Never    Smokeless tobacco: Never   Substance Use Topics    Alcohol use: No     Alcohol/week: 0.0 standard drinks of alcohol    Drug use: No       Review of Systems:  12 review of systems is negative except for the symptoms mentioned in HPI.        Objective:     Vitals:    02/27/24 1357   BP: 117/71   Weight: 99.8 kg (220 lb)   Height: 5' 8" (1.727 m)         General: NAD, well nourished   Eyes: no tearing, discharge, no erythema   ENT: moist mucous membranes of the oral cavity, nares patent    Neck: Supple, full range of motion  Cardiovascular: Warm and well perfused, pulses equal and symmetrical  Lungs: Normal work of breathing, normal chest wall excursions  Skin: No rash, lesions, or breakdown on exposed skin  Psychiatry: Mood and affect are appropriate   Abdomen: soft, non tender, non distended  Extremeties: No cyanosis, clubbing or edema.    Neurological   MENTAL STATUS: Alert and oriented to person, place, and time. Attention and concentration within normal limits. Speech without dysarthria, able to name and repeat without difficulty. Recent and remote memory within normal limits   CRANIAL NERVES: Visual fields intact. PERRL. EOMI. Facial sensation intact. " Face symmetrical. Hearing grossly intact. Full shoulder shrug bilaterally. Tongue protrudes midline   SENSORY: Sensation is intact to light touch throughout.    MOTOR: Normal bulk and tone. No pronator drift.   CEREBELLAR/COORDINATION/GAIT: FTN intact, presents in WC

## 2024-02-29 NOTE — ASSESSMENT & PLAN NOTE
72 yo female with Hx MDRO UTI, L renal stones and stent with SP catheter secondary to neurogenic bladder and obstruction admitted for HD with cf UTI as urine appeared purulent.  - prior UCX last admit in Dec with ESBL Kleb pneumo - ertapenem and cipro sensitive  - UCX x 2 now showing ESBL Kleb Pneumo (intermediate to ertapenem) - meropenem sensitive and repeat UCX post SP change with GNR - patient without SP pain or systemic sign of infection   - concern for infection given amount of RBCs on UA  - SP changed 1/23 - urine cloudy with sediment (patient reports both always present)  - Stable non septic    Plan:  1. Continue Meropenem x 7d total (from SP change 1/23 as urine still cloudy) - eoc 1/30  2. Rec SNF placement to complete IV abx  3. Contact isolation given prior ESBL  4. weekly CBC and CMP while on meropenem  5. Discussed with ID staff  6. Needs urology close fu after dc  7. Will sign off     T&A for DR. MACARENA WONG 5/20/24

## 2024-03-05 ENCOUNTER — HOSPITAL ENCOUNTER (OUTPATIENT)
Dept: VASCULAR SURGERY | Facility: CLINIC | Age: 74
Discharge: HOME | End: 2024-03-05
Attending: SURGERY
Payer: MEDICARE

## 2024-03-05 ENCOUNTER — OFFICE VISIT (OUTPATIENT)
Dept: VASCULAR SURGERY | Facility: CLINIC | Age: 74
End: 2024-03-05
Payer: MEDICARE

## 2024-03-05 VITALS — SYSTOLIC BLOOD PRESSURE: 143 MMHG | TEMPERATURE: 99 F | DIASTOLIC BLOOD PRESSURE: 72 MMHG | HEART RATE: 75 BPM

## 2024-03-05 DIAGNOSIS — Z99.2 ESRD (END STAGE RENAL DISEASE) ON DIALYSIS: ICD-10-CM

## 2024-03-05 DIAGNOSIS — N18.6 ESRD (END STAGE RENAL DISEASE) ON DIALYSIS: Primary | ICD-10-CM

## 2024-03-05 DIAGNOSIS — N18.6 ESRD (END STAGE RENAL DISEASE) ON DIALYSIS: ICD-10-CM

## 2024-03-05 DIAGNOSIS — Z99.2 ESRD (END STAGE RENAL DISEASE) ON DIALYSIS: Primary | ICD-10-CM

## 2024-03-05 PROCEDURE — 3066F NEPHROPATHY DOC TX: CPT | Mod: HCNC,CPTII,S$GLB, | Performed by: SURGERY

## 2024-03-05 PROCEDURE — 99024 POSTOP FOLLOW-UP VISIT: CPT | Mod: HCNC,S$GLB,, | Performed by: SURGERY

## 2024-03-05 PROCEDURE — 93990 DOPPLER FLOW TESTING: CPT | Mod: HCNC,S$GLB,, | Performed by: SURGERY

## 2024-03-05 PROCEDURE — 3077F SYST BP >= 140 MM HG: CPT | Mod: HCNC,CPTII,S$GLB, | Performed by: SURGERY

## 2024-03-05 PROCEDURE — 1125F AMNT PAIN NOTED PAIN PRSNT: CPT | Mod: HCNC,CPTII,S$GLB, | Performed by: SURGERY

## 2024-03-05 PROCEDURE — 1101F PT FALLS ASSESS-DOCD LE1/YR: CPT | Mod: HCNC,CPTII,S$GLB, | Performed by: SURGERY

## 2024-03-05 PROCEDURE — 3078F DIAST BP <80 MM HG: CPT | Mod: HCNC,CPTII,S$GLB, | Performed by: SURGERY

## 2024-03-05 PROCEDURE — 1160F RVW MEDS BY RX/DR IN RCRD: CPT | Mod: HCNC,CPTII,S$GLB, | Performed by: SURGERY

## 2024-03-05 PROCEDURE — 3288F FALL RISK ASSESSMENT DOCD: CPT | Mod: HCNC,CPTII,S$GLB, | Performed by: SURGERY

## 2024-03-05 PROCEDURE — 1159F MED LIST DOCD IN RCRD: CPT | Mod: HCNC,CPTII,S$GLB, | Performed by: SURGERY

## 2024-03-05 PROCEDURE — 99999 PR PBB SHADOW E&M-EST. PATIENT-LVL III: CPT | Mod: PBBFAC,HCNC,, | Performed by: SURGERY

## 2024-03-05 NOTE — PROGRESS NOTES
VASCULAR SURGERY SERVICE    REFERRING DOCTOR: Lavinia Nieto NP     CHIEF COMPLAINT:  End-stage renal disease    HISTORY OF PRESENT ILLNESS: Cecile Bowen is a 73 y.o. female with prior simple mastectomy on the left, sentinel node biopsy, no axillary node dissection, with end-stage renal disease times 3 months via right IJ PermCath who is sent for permanent dialysis access.  She is currently dialyzing only Mondays and Fridays.    She is right-handed.  She has no history of AICD or pacemaker placement.  She does have known bilateral carpal tunnel syndrome with the occasional numbness in her hands but has not had a release.    She takes Eliquis 5 mg but only once a day because of easy bruising.  She is on clear why she is on Eliquis    Finally she takes chronic narcotics for back pain    3/5/2024:  This is initial follow-up after left brachiocephalic AV fistula creation 02/14/2014.  She is without complaint specifically no hand pain weakness or numbness    Past Medical History:   Diagnosis Date    Cervicogenic migraine     Chronic pain     CKD (chronic kidney disease) stage 4, GFR 15-29 ml/min     [facility]    CKD (chronic kidney disease) stage 4, GFR 15-29 ml/min     Diabetes mellitus     Long term use of Insulin, Diabetic Neuropathy    Dialysis patient 1/21/2022    Fibromyalgia     Hydronephrosis     Hyperlipidemia     Hypertension 12/12/2012    Hypothyroidism 12/12/2012    ARON (iron deficiency anemia)     Insomnia     Levoscoliosis     Malignant neoplasm of upper-outer quadrant of left breast in female, estrogen receptor positive     Metabolic acidosis     Mobility impaired     Nuclear sclerosis - Both Eyes 3/24/2014    VIJAYA (obstructive sleep apnea)     Osteopenia     Pulmonary nodule     Recurrent UTI     Renal manifestation of secondary diabetes mellitus     Secondary hyperparathyroidism, renal     Urinary retention        Past Surgical History:   Procedure Laterality Date    AV FISTULA PLACEMENT Left  2/14/2024    Procedure: CREATION, AV FISTULA;  Surgeon: RODRIGO Friedman III, MD;  Location: [facility] OR 2ND FLR;  Service: Vascular;  Laterality: Left;  LUE AVF creation vs AVG insertion    BIOPSY OF URETER Left 2/18/2022    Procedure: BIOPSY, URETER;  Surgeon: Ronel Whittington MD;  Location: [facility] OR 1ST FLR;  Service: Urology;  Laterality: Left;    BREAST BIOPSY Right     benign    CHOLECYSTECTOMY      COLONOSCOPY N/A 1/13/2017    Procedure: COLONOSCOPY;  Surgeon: Morris Wiseman MD;  Location: [facility] ENDO (4TH FLR);  Service: Endoscopy;  Laterality: N/A;  Renal pt Nephrology advised to avoid phosphate preps    COLONOSCOPY N/A 12/7/2023    Procedure: COLONOSCOPY;  Surgeon: Herve Allen MD;  Location: [facility] ENDO (2ND FLR);  Service: Endoscopy;  Laterality: N/A;  HD MWF; labs prior  suprapubic catheter  pt does not ambulate-uses christina lift- will have sling with her  9/7 ref. by Anastasiia Campbell, , PEG, instr. mailed, Eliquis hold approval pending-[facility] to hold Eliquis 2 days per Dr Navarro-GT  1/30-precall complete-MS    CYSTOSCOPY N/A 10/8/2018    Procedure: CYSTOSCOPY;  Surgeon: Ronel Whittington MD;  [facility];  Service: Urology;  Laterality: N/A;  45 min    CYSTOSCOPY N/A 3/25/2019    Procedure: CYSTOSCOPY;  Surgeon: Ronel Whittington MD;  [facility];  Service: Urology;  Laterality: N/A;  45 min    CYSTOSCOPY N/A 8/26/2019    Procedure: CYSTOSCOPY;  Surgeon: Ronel Whittington MD;  [facility];  Service: Urology;  Laterality: N/A;  45 min    CYSTOSCOPY N/A 7/2/2021    Procedure: CYSTOSCOPY;  Surgeon: Ronel Whittington MD;  [facility];  Service: Urology;  Laterality: N/A;    CYSTOSCOPY  12/23/2021    Procedure: CYSTOSCOPY;  Surgeon: Ronel Whittington MD;  Location: [facility] OR [address] [place] [address];  Service: Urology;;    CYSTOSCOPY  2/18/2022    Procedure: CYSTOSCOPY;  Surgeon: Ronel Whittington MD;  Location: [facility] OR [address] [place] [address];   Service: Urology;;    CYSTOSCOPY W/ URETERAL STENT PLACEMENT Left 5/15/2021    Procedure: CYSTOSCOPY, WITH URETERAL STENT INSERTION;  Surgeon: Levy Sánchez Jr., MD;  Location: [address];  Service: Urology;  Laterality: Left;    CYSTOSCOPY WITH BIOPSY OF BLADDER N/A 1/27/2020    Procedure: CYSTOSCOPY, WITH BLADDER BIOPSY, WITH FULGURATION IF INDICATED;  Surgeon: Ronel Whittington MD;  Location: [address];  Service: Urology;  Laterality: N/A;    DILATION AND CURETTAGE OF UTERUS      GALLBLADDER SURGERY  2006    HYSTERECTOMY      INJECTION FOR SENTINEL NODE IDENTIFICATION Left 8/1/2019    Procedure: INJECTION, FOR SENTINEL NODE IDENTIFICATION;  Surgeon: Samia Fulton MD;  Location: [address];  Service: General;  Laterality: Left;    INJECTION OF BOTULINUM TOXIN TYPE A N/A 10/8/2018    Procedure: INJECTION, BOTULINUM TOXIN, TYPE A 300 UNITS;  Surgeon: Ronel Whittington MD;  Location: [address];  Service: Urology;  Laterality: N/A;    INJECTION OF BOTULINUM TOXIN TYPE A N/A 3/25/2019    Procedure: INJECTION, BOTULINUM TOXIN, TYPE A 300 UNITS;  Surgeon: Ronel Whittington MD;  Location: [address];  Service: Urology;  Laterality: N/A;    INJECTION OF BOTULINUM TOXIN TYPE A N/A 8/26/2019    Procedure: INJECTION, BOTULINUM TOXIN, TYPE A 300 UNITS;  Surgeon: Ronel Whittington MD;  Location: [address];  Service: Urology;  Laterality: N/A;    MASTECTOMY Left 8/1/2019    Procedure: MASTECTOMY 23 hour stay;  Surgeon: Samia Fulton MD;  Location: [address];  Service: General;  Laterality: Left;    MEDIPORT REMOVAL Right 6/24/2022    Procedure: REMOVAL, CATHETER, CENTRAL VENOUS, TUNNELED, WITH PORT;  Surgeon: Isauro Anderson MD;  Location: [facility] CATH LAB;  Service: Radiology;  Laterality: Right;    OVARIAN CYST SURGERY  1985    REPLACEMENT OF STENT Left 12/23/2021    Procedure: REPLACEMENT, STENT;  Surgeon: Ronel Whittington MD;  Location: [address];   "Service: Urology;  Laterality: Left;    REPLACEMENT OF STENT Left 2/18/2022    Procedure: REPLACEMENT, STENT;  Surgeon: Ronel Whittington MD;  Location: [facility] OR 1ST FLR;  Service: Urology;  Laterality: Left;    RETROGRADE PYELOGRAPHY Left 2/18/2022    Procedure: PYELOGRAM, RETROGRADE;  Surgeon: Ronel Whittington MD;  Location: [facility] OR 1ST FLR;  Service: Urology;  Laterality: Left;    SENTINEL LYMPH NODE BIOPSY Left 8/1/2019    Procedure: BIOPSY, LYMPH NODE, SENTINEL;  Surgeon: Samia Fulton MD;  Location: [facility] OR 2ND FLR;  Service: General;  Laterality: Left;    spt placement      TONSILLECTOMY, ADENOIDECTOMY      TOTAL ABDOMINAL HYSTERECTOMY W/ BILATERAL SALPINGOOPHORECTOMY  1985    URETEROSCOPY Left 2/18/2022    Procedure: URETEROSCOPY;  Surgeon: Ronel Whittington MD;  Location: [facility] OR 1ST FLR;  Service: Urology;  Laterality: Left;         Current Outpatient Medications:     amLODIPine (NORVASC) 10 MG tablet, Take 1 tablet (10 mg total) by mouth once daily., Disp: 90 tablet, Rfl: 3    anastrozole (ARIMIDEX) 1 mg Tab, TAKE 1 TABLET BY MOUTH EVERY DAY, Disp: 90 tablet, Rfl: 2    apixaban (ELIQUIS) 5 mg Tab, Take 1 tablet (5 mg total) by mouth Daily., Disp: , Rfl:     BD INSULIN SYRINGE ULTRA-FINE 0.5 mL 31 gauge x 5/16" Syrg, USE WITH INSULIN 4 TIMES A DAY, Disp: 100 each, Rfl: 12    calcium acetate,phosphat bind, (PHOSLO) 667 mg capsule, Take by mouth 3 (three) times daily., Disp: , Rfl:     calcium acetate,phosphat bind, (PHOSLO) 667 mg tablet, Take 1 tablet (667 mg total) by mouth 3 (three) times daily with meals., Disp: 90 tablet, Rfl: 11    carvediloL (COREG) 6.25 MG tablet, Take 1 tablet (6.25 mg total) by mouth 2 (two) times daily., Disp: 60 tablet, Rfl: 11    DULoxetine (CYMBALTA) 20 MG capsule, Take 2 capsules (40 mg total) by mouth once daily., Disp: 180 capsule, Rfl: 1    erenumab-aooe (AIMOVIG AUTOINJECTOR) 140 mg/mL autoinjector, 140 mg MONTHLY (route: subcutaneous), Disp: 1 each, Rfl: " "11    ergocalciferol (ERGOCALCIFEROL) 50,000 unit Cap, TAKE ONE CAPSULE BY MOUTH ONE TIME PER WEEK, TAKES ON TUESDAYS, Disp: 12 capsule, Rfl: 3    furosemide (LASIX) 40 MG tablet, Take 1 tablet (40 mg total) by mouth once daily., Disp: 30 tablet, Rfl: 11    HYDROcodone-acetaminophen (NORCO)  mg per tablet, Take 1 tablet by mouth every 6 (six) hours as needed for Pain., Disp: 120 tablet, Rfl: 0    insulin (LANTUS SOLOSTAR U-100 INSULIN) glargine 100 units/mL SubQ pen, INJECT 14-15 UNITS UNDER THE SKIN ONCE DAILY (Patient taking differently: 15 Units every evening. INJECT 14-16 UNITS UNDER THE SKIN ONCE DAILY), Disp: 15 each, Rfl: 3    magnesium oxide (MAG-OX) 400 mg (241.3 mg magnesium) tablet, Take 1 tablet (400 mg total) by mouth once daily., Disp: 30 tablet, Rfl: 2    methocarbamoL (ROBAXIN) 750 MG Tab, TAKE 1-2 TABLETS (750-1,500 MG TOTAL) BY MOUTH 3 (THREE) TIMES DAILY AS NEEDED (MUSCLE SPASMS)., Disp: 180 tablet, Rfl: 1    NIFEdipine (PROCARDIA-XL) 90 MG (OSM) 24 hr tablet, Take 1 tablet (90 mg total) by mouth once daily., Disp: 30 tablet, Rfl: 11    oxybutynin (DITROPAN-XL) 10 MG 24 hr tablet, TAKE 1 TABLET BY MOUTH EVERY DAY, Disp: 90 tablet, Rfl: 4    pen needle, diabetic (BD ULTRA-FINE SHORT PEN NEEDLE) 31 gauge x 5/16" Ndle, USE WITH LANTUS DAILY, e 11.65, Disp: 100 each, Rfl: 3    sumatriptan (IMITREX) 100 MG tablet, Take 1 tablet (100 mg total) by mouth 2 (two) times daily as needed for Migraine (Do not take more than 2 in 24 hours or 3 in a week)., Disp: 30 tablet, Rfl: 1    SYNTHROID 50 mcg tablet, TAKE 1 TABLET BY MOUTH BEFORE BREAKFAST. ADMINISTER ON AN EMPTY STOMACH AT LEAST 30 MINUTES BEFORE FOOD. IF RECEIVING TUBE FEEDS, HOLD TUBE FEEDS FOR 1 HOUR BEFORE AND AFTER LEVOTHYROXINE ADMINISTRATION., Disp: 90 tablet, Rfl: 2    traZODone (DESYREL) 100 MG tablet, TAKE 1 TABLET BY MOUTH NIGHTLY AS NEEDED FOR INSOMNIA., Disp: 90 tablet, Rfl: 2    atorvastatin (LIPITOR) 80 MG tablet, Take 1 tablet (80 " mg total) by mouth once daily., Disp: 90 tablet, Rfl: 3  No current facility-administered medications for this visit.    Facility-Administered Medications Ordered in Other Visits:     heparin (porcine) injection 2,000 Units, 2,000 Units, Intravenous, Once, Emmanuel Hare Jr., MD    Review of patient's allergies indicates:  No Known Allergies    Family History   Problem Relation Age of Onset    Diabetes Sister     Kidney disease Sister         CKD III    ALS Mother         d.    Cancer Maternal Grandmother         d. colon    Cancer Paternal Grandfather         d. lung    Scoliosis Brother         increased pain    Prostate cancer Brother         cured s/p surgery    Cancer Brother         rare cancer that got into the bones    Diabetes Maternal Aunt     Kidney disease Maternal Aunt     Diabetes Maternal Uncle     Amblyopia Neg Hx     Blindness Neg Hx     Cataracts Neg Hx     Glaucoma Neg Hx     Macular degeneration Neg Hx     Retinal detachment Neg Hx     Strabismus Neg Hx        Social History     Tobacco Use    Smoking status: Never    Smokeless tobacco: Never   Substance Use Topics    Alcohol use: No     Alcohol/week: 0.0 standard drinks of alcohol    Drug use: No     PHYSICAL EXAM:   BP (!) 143/72 (BP Location: Right arm, Patient Position: Sitting, BP Method: Medium (Automatic))   Pulse 75   Temp 98.6 °F (37 °C) (Oral)   Constitutional:  Alert,   Well-appearing  In no distress.  Traveling by wheelchair   Neurological: Normal speech  no focal findings  CN II - XII grossly intact.    Psychiatric: Mood and affect appropriate and symmetric.   HEENT: Normocephalic / atraumatic  PERRLA  Midline trachea  No scars across the neck   Cardiac: Regular rate and rhythm.   Pulmonary: Normal pulmonary effort.   Abdomen: Soft, not distended.     Skin: Warm and well perfused.    Vascular:  2+ left radial pulse   Extremities/  Musculoskeletal: Left arm:  Well-healed transverse incision below the antecubital fossa with  interrupted nylons in place.  Soft thrill with no pulsatility in the fistula which already appears relatively well matured    No arm swelling     IMAGING:  Duplex of the AV fistula shows to be patent with a potential para anastomotic stenosis with a velocity of 735.  Flow volume is 774  Diameter 7.1 proximal, 6.8 mid, 6.7 distal  Depth 4.6 proximal, 5.3 minute, 8.5 distal      IMPRESSION:    Excellent early maturation, left brachiocephalic AV fistula placed proximally 3 weeks ago    2.  Narcotic dependence for chronic back pain.       PLAN:    DC nylon sutures today  F/U In 1 month for repeat quantitative duplex, would anticipate being able to access the fistula after the state    WALE Friedman III, MD, FACS  Professor and Chief, Vascular and Endovascular Surgery      CC; Dr Fulton

## 2024-03-06 ENCOUNTER — OUTPATIENT CASE MANAGEMENT (OUTPATIENT)
Dept: ADMINISTRATIVE | Facility: OTHER | Age: 74
End: 2024-03-06
Payer: MEDICARE

## 2024-03-06 ENCOUNTER — OFFICE VISIT (OUTPATIENT)
Dept: RHEUMATOLOGY | Facility: CLINIC | Age: 74
End: 2024-03-06
Payer: MEDICARE

## 2024-03-06 VITALS
DIASTOLIC BLOOD PRESSURE: 84 MMHG | HEART RATE: 75 BPM | HEIGHT: 68 IN | SYSTOLIC BLOOD PRESSURE: 141 MMHG | BODY MASS INDEX: 33.34 KG/M2 | WEIGHT: 220 LBS

## 2024-03-06 DIAGNOSIS — R76.8 DS DNA ANTIBODY POSITIVE: Primary | ICD-10-CM

## 2024-03-06 DIAGNOSIS — R76.8 POSITIVE ANA (ANTINUCLEAR ANTIBODY): ICD-10-CM

## 2024-03-06 PROCEDURE — 3077F SYST BP >= 140 MM HG: CPT | Mod: HCNC,CPTII,S$GLB, | Performed by: STUDENT IN AN ORGANIZED HEALTH CARE EDUCATION/TRAINING PROGRAM

## 2024-03-06 PROCEDURE — 3079F DIAST BP 80-89 MM HG: CPT | Mod: HCNC,CPTII,S$GLB, | Performed by: STUDENT IN AN ORGANIZED HEALTH CARE EDUCATION/TRAINING PROGRAM

## 2024-03-06 PROCEDURE — 1159F MED LIST DOCD IN RCRD: CPT | Mod: HCNC,CPTII,S$GLB, | Performed by: STUDENT IN AN ORGANIZED HEALTH CARE EDUCATION/TRAINING PROGRAM

## 2024-03-06 PROCEDURE — 3066F NEPHROPATHY DOC TX: CPT | Mod: HCNC,CPTII,S$GLB, | Performed by: STUDENT IN AN ORGANIZED HEALTH CARE EDUCATION/TRAINING PROGRAM

## 2024-03-06 PROCEDURE — 3288F FALL RISK ASSESSMENT DOCD: CPT | Mod: HCNC,CPTII,S$GLB, | Performed by: STUDENT IN AN ORGANIZED HEALTH CARE EDUCATION/TRAINING PROGRAM

## 2024-03-06 PROCEDURE — 1125F AMNT PAIN NOTED PAIN PRSNT: CPT | Mod: HCNC,CPTII,S$GLB, | Performed by: STUDENT IN AN ORGANIZED HEALTH CARE EDUCATION/TRAINING PROGRAM

## 2024-03-06 PROCEDURE — 99999 PR PBB SHADOW E&M-EST. PATIENT-LVL V: CPT | Mod: PBBFAC,HCNC,, | Performed by: STUDENT IN AN ORGANIZED HEALTH CARE EDUCATION/TRAINING PROGRAM

## 2024-03-06 PROCEDURE — 1101F PT FALLS ASSESS-DOCD LE1/YR: CPT | Mod: HCNC,CPTII,S$GLB, | Performed by: STUDENT IN AN ORGANIZED HEALTH CARE EDUCATION/TRAINING PROGRAM

## 2024-03-06 PROCEDURE — 99205 OFFICE O/P NEW HI 60 MIN: CPT | Mod: HCNC,S$GLB,, | Performed by: STUDENT IN AN ORGANIZED HEALTH CARE EDUCATION/TRAINING PROGRAM

## 2024-03-06 PROCEDURE — 3008F BODY MASS INDEX DOCD: CPT | Mod: HCNC,CPTII,S$GLB, | Performed by: STUDENT IN AN ORGANIZED HEALTH CARE EDUCATION/TRAINING PROGRAM

## 2024-03-06 NOTE — PROGRESS NOTES
RHEUMATOLOGY OUTPATIENT CLINIC NOTE    3/6/2024    Attending Rheumatologist: Sharath Rogers  Primary Care Provider: Lurdes Navarro MD   Physician Requesting Consultation: Maci Blue, NP  [address] [place] [address]  Chief Complaint/Reason For Consultation:  Fibromyalgia      Subjective:       HPI  Cecile Bowen is a 73 y.o. White female with pmhx noted below referred for positive JASON and dsdna. Patient has several chronic health issues- she has chronic headaches difficult to control, ESRD on HD 2 x per week and hx of breast cancer s/p mastectomy no chemo or radiation needed. Patient reports that she is having joint swelling, stiffness and pain that is worse in AM and gets better throughout the day. It is hard to control due to medications she can use due to kidney disease.     Review of Systems   Constitutional:  Positive for fatigue.   HENT: Negative.     Respiratory:  Positive for shortness of breath.    Cardiovascular:  Positive for leg swelling.   Gastrointestinal: Negative.    Genitourinary: Negative.    Neurological:  Positive for weakness.   Psychiatric/Behavioral: Negative.          Chronic comorbid conditions affecting medical decision making today:  Past Medical History:   Diagnosis Date    Cervicogenic migraine     Chronic pain     CKD (chronic kidney disease) stage 4, GFR 15-29 ml/min     [facility]    CKD (chronic kidney disease) stage 4, GFR 15-29 ml/min     Diabetes mellitus     Long term use of Insulin, Diabetic Neuropathy    Dialysis patient 1/21/2022    Fibromyalgia     Hydronephrosis     Hyperlipidemia     Hypertension 12/12/2012    Hypothyroidism 12/12/2012    ARON (iron deficiency anemia)     Insomnia     Levoscoliosis     Malignant neoplasm of upper-outer quadrant of left breast in female, estrogen receptor positive     Metabolic acidosis     Mobility impaired     Nuclear sclerosis - Both Eyes 3/24/2014    VIJAYA (obstructive sleep apnea)      Osteopenia     Pulmonary nodule     Recurrent UTI     Renal manifestation of secondary diabetes mellitus     Secondary hyperparathyroidism, renal     Urinary retention      Past Surgical History:   Procedure Laterality Date    AV FISTULA PLACEMENT Left 2/14/2024    Procedure: CREATION, AV FISTULA;  Surgeon: RODRIGO Friedman III, MD;  Location: [facility] OR 2ND FLR;  Service: Vascular;  Laterality: Left;  LUE AVF creation vs AVG insertion    BIOPSY OF URETER Left 2/18/2022    Procedure: BIOPSY, URETER;  Surgeon: Ronel Whittington MD;  Location: [facility] OR 1ST FLR;  Service: Urology;  Laterality: Left;    BREAST BIOPSY Right     benign    CHOLECYSTECTOMY      COLONOSCOPY N/A 1/13/2017    Procedure: COLONOSCOPY;  Surgeon: Morris Wiseman MD;  Location: [facility] ENDO (4TH FLR);  Service: Endoscopy;  Laterality: N/A;  Renal pt Nephrology advised to avoid phosphate preps    COLONOSCOPY N/A 12/7/2023    Procedure: COLONOSCOPY;  Surgeon: Herve Allen MD;  Location: [facility] ENDO (2ND FLR);  Service: Endoscopy;  Laterality: N/A;  HD MWF; labs prior  suprapubic catheter  pt does not ambulate-uses christina lift- will have sling with her  9/7 ref. by Anastasiia Campbell, , PEG, instr. mailed, Eliquis hold approval pending-[facility] to hold Eliquis 2 days per Dr Navarro-GT  1/30-precall complete-MS    CYSTOSCOPY N/A 10/8/2018    Procedure: CYSTOSCOPY;  Surgeon: Ronel Whittington MD;  [facility];  Service: Urology;  Laterality: N/A;  45 min    CYSTOSCOPY N/A 3/25/2019    Procedure: CYSTOSCOPY;  Surgeon: Ronel Whittington MD;  [facility];  Service: Urology;  Laterality: N/A;  45 min    CYSTOSCOPY N/A 8/26/2019    Procedure: CYSTOSCOPY;  Surgeon: Ronel Whittington MD;  [facility];  Service: Urology;  Laterality: N/A;  45 min    CYSTOSCOPY N/A 7/2/2021    Procedure: CYSTOSCOPY;  Surgeon: Ronel Whittington MD;  Location: [facility] OR 1ST FLR;  Service: Urology;  Laterality: N/A;     CYSTOSCOPY  12/23/2021    Procedure: CYSTOSCOPY;  Surgeon: Ronel Whittington MD;  [facility];  Service: Urology;;    CYSTOSCOPY  2/18/2022    Procedure: CYSTOSCOPY;  Surgeon: Ronel Whittington MD;  [facility];  Service: Urology;;    CYSTOSCOPY W/ URETERAL STENT PLACEMENT Left 5/15/2021    Procedure: CYSTOSCOPY, WITH URETERAL STENT INSERTION;  Surgeon: Levy Sánchez Jr., MD;  Location: [facility] OR [address] [place] [address];  Service: Urology;  Laterality: Left;    CYSTOSCOPY WITH BIOPSY OF BLADDER N/A 1/27/2020    Procedure: CYSTOSCOPY, WITH BLADDER BIOPSY, WITH FULGURATION IF INDICATED;  Surgeon: Ronel Whittington MD;  Location: [facility] OR [address] [place] [address];  Service: Urology;  Laterality: N/A;    DILATION AND CURETTAGE OF UTERUS      GALLBLADDER SURGERY  2006    HYSTERECTOMY      INJECTION FOR SENTINEL NODE IDENTIFICATION Left 8/1/2019    Procedure: INJECTION, FOR SENTINEL NODE IDENTIFICATION;  Surgeon: Samia Fulton MD;  Location: [facility] OR [address] [place] [address];  Service: General;  Laterality: Left;    INJECTION OF BOTULINUM TOXIN TYPE A N/A 10/8/2018    Procedure: INJECTION, BOTULINUM TOXIN, TYPE A 300 UNITS;  Surgeon: Ronel Whittignton MD;  Location: [facility] OR [address] [place] [address];  Service: Urology;  Laterality: N/A;    INJECTION OF BOTULINUM TOXIN TYPE A N/A 3/25/2019    Procedure: INJECTION, BOTULINUM TOXIN, TYPE A 300 UNITS;  Surgeon: Ronel Whittington MD;  Location: [facility] OR [address] [place] [address];  Service: Urology;  Laterality: N/A;    INJECTION OF BOTULINUM TOXIN TYPE A N/A 8/26/2019    Procedure: INJECTION, BOTULINUM TOXIN, TYPE A 300 UNITS;  Surgeon: Ronel Whittington MD;  Location: [facility] OR [address] [place] [address];  Service: Urology;  Laterality: N/A;    MASTECTOMY Left 8/1/2019    Procedure: MASTECTOMY 23 hour stay;  Surgeon: Samia Fulton MD;  Location: [facility] OR 2ND FLR;  Service: General;  Laterality: Left;    MEDIPORT REMOVAL Right 6/24/2022    Procedure: REMOVAL, CATHETER, CENTRAL VENOUS, TUNNELED, WITH PORT;  Surgeon: Isauro  JAUN Anderson MD;  Location: [facility] CATH LAB;  Service: Radiology;  Laterality: Right;    OVARIAN CYST SURGERY  1985    REPLACEMENT OF STENT Left 12/23/2021    Procedure: REPLACEMENT, STENT;  Surgeon: Ronel Whittington MD;  [facility];  Service: Urology;  Laterality: Left;    REPLACEMENT OF STENT Left 2/18/2022    Procedure: REPLACEMENT, STENT;  Surgeon: Ronel Whittington MD;  Location: [facility] OR 1ST FLR;  Service: Urology;  Laterality: Left;    RETROGRADE PYELOGRAPHY Left 2/18/2022    Procedure: PYELOGRAM, RETROGRADE;  Surgeon: Ronel Whittington MD;  Location: [facility] OR 1ST FLR;  Service: Urology;  Laterality: Left;    SENTINEL LYMPH NODE BIOPSY Left 8/1/2019    Procedure: BIOPSY, LYMPH NODE, SENTINEL;  Surgeon: Samia Fulton MD;  Location: [facility] OR 2ND FLR;  Service: General;  Laterality: Left;    spt placement      TONSILLECTOMY, ADENOIDECTOMY      TOTAL ABDOMINAL HYSTERECTOMY W/ BILATERAL SALPINGOOPHORECTOMY  1985    URETEROSCOPY Left 2/18/2022    Procedure: URETEROSCOPY;  Surgeon: Ronel Whittington MD;  [facility];  Service: Urology;  Laterality: Left;     Family History   Problem Relation Age of Onset    Diabetes Sister     Kidney disease Sister         CKD III    ALS Mother         d.    Cancer Maternal Grandmother         d. colon    Cancer Paternal Grandfather         d. lung    Scoliosis Brother         increased pain    Prostate cancer Brother         cured s/p surgery    Cancer Brother         rare cancer that got into the bones    Diabetes Maternal Aunt     Kidney disease Maternal Aunt     Diabetes Maternal Uncle     Amblyopia Neg Hx     Blindness Neg Hx     Cataracts Neg Hx     Glaucoma Neg Hx     Macular degeneration Neg Hx     Retinal detachment Neg Hx     Strabismus Neg Hx      Social History     Substance and Sexual Activity   Alcohol Use No    Alcohol/week: 0.0 standard drinks of alcohol     Social History     Tobacco Use   Smoking Status Never   Smokeless  "Tobacco Never     Social History     Substance and Sexual Activity   Drug Use No       Current Outpatient Medications:     amLODIPine (NORVASC) 10 MG tablet, Take 1 tablet (10 mg total) by mouth once daily., Disp: 90 tablet, Rfl: 3    anastrozole (ARIMIDEX) 1 mg Tab, TAKE 1 TABLET BY MOUTH EVERY DAY, Disp: 90 tablet, Rfl: 2    apixaban (ELIQUIS) 5 mg Tab, Take 1 tablet (5 mg total) by mouth Daily., Disp: , Rfl:     BD INSULIN SYRINGE ULTRA-FINE 0.5 mL 31 gauge x 5/16" Syrg, USE WITH INSULIN 4 TIMES A DAY, Disp: 100 each, Rfl: 12    calcium acetate,phosphat bind, (PHOSLO) 667 mg capsule, Take by mouth 3 (three) times daily., Disp: , Rfl:     calcium acetate,phosphat bind, (PHOSLO) 667 mg tablet, Take 1 tablet (667 mg total) by mouth 3 (three) times daily with meals., Disp: 90 tablet, Rfl: 11    carvediloL (COREG) 6.25 MG tablet, Take 1 tablet (6.25 mg total) by mouth 2 (two) times daily., Disp: 60 tablet, Rfl: 11    DULoxetine (CYMBALTA) 20 MG capsule, Take 2 capsules (40 mg total) by mouth once daily., Disp: 180 capsule, Rfl: 1    erenumab-aooe (AIMOVIG AUTOINJECTOR) 140 mg/mL autoinjector, 140 mg MONTHLY (route: subcutaneous), Disp: 1 each, Rfl: 11    ergocalciferol (ERGOCALCIFEROL) 50,000 unit Cap, TAKE ONE CAPSULE BY MOUTH ONE TIME PER WEEK, TAKES ON TUESDAYS, Disp: 12 capsule, Rfl: 3    furosemide (LASIX) 40 MG tablet, Take 1 tablet (40 mg total) by mouth once daily., Disp: 30 tablet, Rfl: 11    HYDROcodone-acetaminophen (NORCO)  mg per tablet, Take 1 tablet by mouth every 6 (six) hours as needed for Pain., Disp: 120 tablet, Rfl: 0    insulin (LANTUS SOLOSTAR U-100 INSULIN) glargine 100 units/mL SubQ pen, INJECT 14-15 UNITS UNDER THE SKIN ONCE DAILY (Patient taking differently: 15 Units every evening. INJECT 14-16 UNITS UNDER THE SKIN ONCE DAILY), Disp: 15 each, Rfl: 3    magnesium oxide (MAG-OX) 400 mg (241.3 mg magnesium) tablet, Take 1 tablet (400 mg total) by mouth once daily., Disp: 30 tablet, Rfl: " "2    methocarbamoL (ROBAXIN) 750 MG Tab, TAKE 1-2 TABLETS (750-1,500 MG TOTAL) BY MOUTH 3 (THREE) TIMES DAILY AS NEEDED (MUSCLE SPASMS)., Disp: 180 tablet, Rfl: 1    NIFEdipine (PROCARDIA-XL) 90 MG (OSM) 24 hr tablet, Take 1 tablet (90 mg total) by mouth once daily., Disp: 30 tablet, Rfl: 11    oxybutynin (DITROPAN-XL) 10 MG 24 hr tablet, TAKE 1 TABLET BY MOUTH EVERY DAY, Disp: 90 tablet, Rfl: 4    pen needle, diabetic (BD ULTRA-FINE SHORT PEN NEEDLE) 31 gauge x 5/16" Ndle, USE WITH LANTUS DAILY, e 11.65, Disp: 100 each, Rfl: 3    sumatriptan (IMITREX) 100 MG tablet, Take 1 tablet (100 mg total) by mouth 2 (two) times daily as needed for Migraine (Do not take more than 2 in 24 hours or 3 in a week)., Disp: 30 tablet, Rfl: 1    SYNTHROID 50 mcg tablet, TAKE 1 TABLET BY MOUTH BEFORE BREAKFAST. ADMINISTER ON AN EMPTY STOMACH AT LEAST 30 MINUTES BEFORE FOOD. IF RECEIVING TUBE FEEDS, HOLD TUBE FEEDS FOR 1 HOUR BEFORE AND AFTER LEVOTHYROXINE ADMINISTRATION., Disp: 90 tablet, Rfl: 2    traZODone (DESYREL) 100 MG tablet, TAKE 1 TABLET BY MOUTH NIGHTLY AS NEEDED FOR INSOMNIA., Disp: 90 tablet, Rfl: 2    atorvastatin (LIPITOR) 80 MG tablet, Take 1 tablet (80 mg total) by mouth once daily., Disp: 90 tablet, Rfl: 3  No current facility-administered medications for this visit.    Facility-Administered Medications Ordered in Other Visits:     heparin (porcine) injection 2,000 Units, 2,000 Units, Intravenous, Once, Emmanuel Hare Jr., MD     Objective:         Vitals:    03/06/24 0839   BP: (!) 141/84   Pulse: 75     Physical Exam   Constitutional: normal appearance.   HENT:   Head: Normocephalic and atraumatic.   Nose: Nose normal.   Mouth/Throat: Mucous membranes are dry.   Eyes: Conjunctivae are normal.   Cardiovascular: Normal rate and regular rhythm.   Pulmonary/Chest: Effort normal and breath sounds normal.   Musculoskeletal:         General: Swelling and tenderness present.      Cervical back: Normal range of motion. " "     Comments: Swelling in 2nd and 3rd MCP bilaterally   Tenderness in PIP in L hand    Neurological: She is alert.   Psychiatric: Mood normal.       Reviewed old and all outside pertinent medical records available.    All lab results personally reviewed and interpreted by me.  Lab Results   Component Value Date    WBC 9.77 02/09/2024    HGB 11.0 (L) 02/23/2024    HCT 32.8 (L) 02/23/2024    MCV 95 02/09/2024    MCH 31.0 02/09/2024    MCHC 32.5 02/09/2024    RDW 15.3 (H) 02/09/2024     02/09/2024    MPV 9.1 (L) 01/30/2024    PLTEST Appears normal 12/09/2021       Lab Results   Component Value Date     (L) 02/09/2024    K 3.5 02/09/2024     02/09/2024    CO2 21 (L) 01/30/2024     (H) 01/30/2024    BUN 27 (H) 01/30/2024    CALCIUM 7.9 (L) 02/09/2024    PROT 6.9 01/18/2024    ALBUMIN 3.6 02/09/2024    BILITOT 0.3 01/18/2024    AST 16 01/18/2024    ALKPHOS 198 (H) 02/09/2024    ALT 67 (H) 02/09/2024       Lab Results   Component Value Date    COLORU Yellow 08/06/2023    APPEARANCEUA Cloudy (A) 08/06/2023    SPECGRAV 1.010 08/06/2023    PHUR 7.0 08/06/2023    PROTEINUA 2+ (A) 08/06/2023    KETONESU Negative 08/06/2023    LEUKOCYTESUR 3+ (A) 08/06/2023    NITRITE Negative 08/06/2023    UROBILINOGEN Negative 05/03/2018       Lab Results   Component Value Date    CRP 6.4 06/30/2010       Lab Results   Component Value Date    SEDRATE 110 (H) 03/12/2015       Lab Results   Component Value Date    JASON Negative 06/11/2010    RF <10.0 12/29/2014    SEDRATE 110 (H) 03/12/2015       No components found for: "25OHVITDTOT", "88AJPNNB5", "66PTRXRA4", "METHODNOTE"    Lab Results   Component Value Date    URICACID 6.3 (H) 08/04/2023       No components found for: "TSPOTTB"    Lab Results   Component Value Date    JASON Negative 06/11/2010        Imaging:  All imaging reviewed and independently interpreted by me.         ASSESSMENT / PLAN:     Cecile Bowen is a 73 y.o. White female with:      1. Positive JASON " (antinuclear antibody)  - Patient with multiple symptoms - they could be multifactorial since she has multiple chronic diseases  - Ambulatory referral/consult to Rheumatology  - Cyclic Citrullinated Peptide Antibody, IgG; Future  - Rheumatoid Factor; Future  - Sedimentation rate; Future  - C-Reactive Protein; Future  - Anti-Lurdes 1 Antibody, IgG; Future  - Anti-Scleroderma Antibody; Future  - C3 Complement; Future  - C4 Complement; Future  - Anti-DNA Ab, Double-Stranded; Future    2. Ds DNA antibody positive  - Will evaluate for SLE   - Will repeat dsdna   - Ambulatory referral/consult to Rheumatology  - Cyclic Citrullinated Peptide Antibody, IgG; Future  - Rheumatoid Factor; Future  - Sedimentation rate; Future  - C-Reactive Protein; Future  - Anti-Lurdes 1 Antibody, IgG; Future  - Anti-Scleroderma Antibody; Future  - C3 Complement; Future  - C4 Complement; Future  - Anti-DNA Ab, Double-Stranded; Future       3. Other specified counseling  - over 10 minutes spent regarding below topics:  - Immunization counseling done.  - Weight loss counseling done.  - Nutrition and exercise counseling.  - Limitation of alcohol consumption.  - Regular exercise:  Complicated for patient since she is mostly wheelchair-bound   - Medication counseling provided.    4. Morbid Obesity  - would benefit from decreasing at least 10% of body weight.  - recommended goal of losing 1 lb per week.  - consider nutritionist evaluation.  - would consider screening for VIJAYA per PMD.    Follow up in about 3 months (around 6/6/2024).    Method of contact with patient concerns: [facility]    Disclaimer:  This note is prepared using voice recognition software and as such is likely to have errors and has not been proof read. Please contact me for questions.     Time spent: 60 minutes in face to face discussion concerning diagnosis, prognosis, review of lab and test results, benefits of treatment as well as management of disease, counseling of  patient and coordination of care between various health care providers.  Greater than half the time spent was used for coordination of care and counseling of patient.    Sharath Rogers M.D.  Rheumatology Department   [facility]

## 2024-03-11 NOTE — PROGRESS NOTES
Outpatient Care Management  Plan of Care Follow Up Visit    Patient: Cecile Bowen  MRN: [id number]  Date of Service: 03/06/2024  Completed by: Danielle Woods RN  Referral Date: 02/08/2024    Reason for Visit   Patient presents with    OPCM RN Follow Up Call    OPCM Chart Review       Brief Summary:   OPCM RN follow-up call completed.   Continue education on Pressure Injury.   Next Steps: Patient is in agreement to follow-up call on or around 3/27/2024.

## 2024-03-13 ENCOUNTER — OUTPATIENT CASE MANAGEMENT (OUTPATIENT)
Dept: ADMINISTRATIVE | Facility: OTHER | Age: 74
End: 2024-03-13
Payer: MEDICARE

## 2024-03-20 ENCOUNTER — TELEPHONE (OUTPATIENT)
Dept: INTERNAL MEDICINE | Facility: CLINIC | Age: 74
End: 2024-03-20
Payer: MEDICARE

## 2024-03-20 ENCOUNTER — OUTPATIENT CASE MANAGEMENT (OUTPATIENT)
Dept: ADMINISTRATIVE | Facility: OTHER | Age: 74
End: 2024-03-20
Payer: MEDICARE

## 2024-03-20 ENCOUNTER — PATIENT MESSAGE (OUTPATIENT)
Dept: ADMINISTRATIVE | Facility: OTHER | Age: 74
End: 2024-03-20
Payer: MEDICARE

## 2024-03-20 RX ORDER — HYDROCODONE BITARTRATE AND ACETAMINOPHEN 10; 325 MG/1; MG/1
1 TABLET ORAL EVERY 6 HOURS PRN
Qty: 120 TABLET | Refills: 0 | Status: SHIPPED | OUTPATIENT
Start: 2024-03-20 | End: 2024-04-15 | Stop reason: SDUPTHER

## 2024-03-20 NOTE — TELEPHONE ENCOUNTER
----- Message from Sandy Naranjo sent at 3/20/2024  8:07 AM CDT -----  Contact: [phone]  [email or web address]     Patient is calling for Medical Advice regarding:appt for this morning     How long has patient had these symptoms:    Pharmacy name and phone#:    Would like response via Pax8t:  no     Comments:  Pt is calling she has an appt at 9 this morning and she states her transportation got messed up and she is asking if there is something available this afternoons for her to be seen

## 2024-03-21 ENCOUNTER — TELEPHONE (OUTPATIENT)
Dept: VASCULAR SURGERY | Facility: CLINIC | Age: 74
End: 2024-03-21
Payer: MEDICARE

## 2024-03-21 DIAGNOSIS — Z99.2 ESRD (END STAGE RENAL DISEASE) ON DIALYSIS: Primary | ICD-10-CM

## 2024-03-21 DIAGNOSIS — N18.6 ESRD (END STAGE RENAL DISEASE) ON DIALYSIS: Primary | ICD-10-CM

## 2024-03-23 ENCOUNTER — PATIENT MESSAGE (OUTPATIENT)
Dept: PODIATRY | Facility: CLINIC | Age: 74
End: 2024-03-23
Payer: MEDICARE

## 2024-03-27 ENCOUNTER — TELEPHONE (OUTPATIENT)
Dept: INTERNAL MEDICINE | Facility: CLINIC | Age: 74
End: 2024-03-27
Payer: MEDICARE

## 2024-03-27 NOTE — TELEPHONE ENCOUNTER
Spoke to Maci with Palliative Care, [place].  Home health referred the patient for palliative care.   Can you place the order for approval?

## 2024-03-27 NOTE — TELEPHONE ENCOUNTER
Can you place an order for palliative care?    [facility] [place]/[phone]     Tried calling Palliative care but Maci out for lunch.  Will call back after 1 pm.

## 2024-03-27 NOTE — TELEPHONE ENCOUNTER
----- Message from Lety Flood sent at 3/26/2024  2:24 PM CDT -----  Contact: Maci/ [facility] [place]/[phone]  Maci called, Need to check if PCP approve pallative care. Please call and advise. Thank you.

## 2024-04-02 NOTE — TELEPHONE ENCOUNTER
Left message with answering service for Maci to return call to the office to receive a verbal order for pallative care for this patient.

## 2024-04-09 ENCOUNTER — OFFICE VISIT (OUTPATIENT)
Dept: VASCULAR SURGERY | Facility: CLINIC | Age: 74
End: 2024-04-09
Attending: SURGERY
Payer: MEDICARE

## 2024-04-09 ENCOUNTER — HOSPITAL ENCOUNTER (OUTPATIENT)
Dept: VASCULAR SURGERY | Facility: CLINIC | Age: 74
Discharge: HOME | End: 2024-04-09
Attending: SURGERY
Payer: MEDICARE

## 2024-04-09 ENCOUNTER — OUTPATIENT CASE MANAGEMENT (OUTPATIENT)
Dept: ADMINISTRATIVE | Facility: OTHER | Age: 74
End: 2024-04-09
Payer: MEDICARE

## 2024-04-09 VITALS — TEMPERATURE: 98 F | HEART RATE: 71 BPM | SYSTOLIC BLOOD PRESSURE: 95 MMHG | DIASTOLIC BLOOD PRESSURE: 51 MMHG

## 2024-04-09 DIAGNOSIS — N18.6 ESRD (END STAGE RENAL DISEASE) ON DIALYSIS: ICD-10-CM

## 2024-04-09 DIAGNOSIS — Z99.2 ESRD (END STAGE RENAL DISEASE) ON DIALYSIS: ICD-10-CM

## 2024-04-09 DIAGNOSIS — T82.858D ARTERIOVENOUS FISTULA STENOSIS, SUBSEQUENT ENCOUNTER: Primary | ICD-10-CM

## 2024-04-09 DIAGNOSIS — N18.6 ESRD (END STAGE RENAL DISEASE) ON DIALYSIS: Primary | ICD-10-CM

## 2024-04-09 DIAGNOSIS — Z99.2 ESRD (END STAGE RENAL DISEASE) ON DIALYSIS: Primary | ICD-10-CM

## 2024-04-09 PROCEDURE — 1160F RVW MEDS BY RX/DR IN RCRD: CPT | Mod: CPTII,S$GLB,, | Performed by: SURGERY

## 2024-04-09 PROCEDURE — 1101F PT FALLS ASSESS-DOCD LE1/YR: CPT | Mod: CPTII,S$GLB,, | Performed by: SURGERY

## 2024-04-09 PROCEDURE — 99999 PR PBB SHADOW E&M-EST. PATIENT-LVL II: CPT | Mod: PBBFAC,HCNC,, | Performed by: SURGERY

## 2024-04-09 PROCEDURE — 3078F DIAST BP <80 MM HG: CPT | Mod: CPTII,S$GLB,, | Performed by: SURGERY

## 2024-04-09 PROCEDURE — 3066F NEPHROPATHY DOC TX: CPT | Mod: CPTII,S$GLB,, | Performed by: SURGERY

## 2024-04-09 PROCEDURE — 1125F AMNT PAIN NOTED PAIN PRSNT: CPT | Mod: CPTII,S$GLB,, | Performed by: SURGERY

## 2024-04-09 PROCEDURE — 93990 DOPPLER FLOW TESTING: CPT | Mod: S$GLB,,, | Performed by: SURGERY

## 2024-04-09 PROCEDURE — 1159F MED LIST DOCD IN RCRD: CPT | Mod: CPTII,S$GLB,, | Performed by: SURGERY

## 2024-04-09 PROCEDURE — 3288F FALL RISK ASSESSMENT DOCD: CPT | Mod: CPTII,S$GLB,, | Performed by: SURGERY

## 2024-04-09 PROCEDURE — 3074F SYST BP LT 130 MM HG: CPT | Mod: CPTII,S$GLB,, | Performed by: SURGERY

## 2024-04-09 PROCEDURE — 99024 POSTOP FOLLOW-UP VISIT: CPT | Mod: S$GLB,,, | Performed by: SURGERY

## 2024-04-09 PROCEDURE — 4010F ACE/ARB THERAPY RXD/TAKEN: CPT | Mod: CPTII,S$GLB,, | Performed by: SURGERY

## 2024-04-09 NOTE — H&P (VIEW-ONLY)
VASCULAR SURGERY SERVICE    REFERRING DOCTOR: Lavinia Nieto NP     CHIEF COMPLAINT:  End-stage renal disease    HISTORY OF PRESENT ILLNESS: Cecile Bowen is a 73 y.o. female with prior simple mastectomy on the left, sentinel node biopsy, no axillary node dissection, with end-stage renal disease times 3 months via right IJ PermCath who is sent for permanent dialysis access.  She is currently dialyzing only Mondays and Fridays.    She is right-handed.  She has no history of AICD or pacemaker placement.  She does have known bilateral carpal tunnel syndrome with the occasional numbness in her hands but has not had a release.    She takes Eliquis 5 mg but only once a day because of easy bruising.  She is on clear why she is on Eliquis    Finally she takes chronic narcotics for back pain    3/5/2024:  This is initial follow-up after left brachiocephalic AV fistula creation 02/14/2024.  She is without complaint specifically no hand pain weakness or numbness    04/09/2024:  She now returns.  She is dialyzing on Mondays and Fridays only.  She denies any hand pain weakness or numbness    Past Medical History:   Diagnosis Date    Cervicogenic migraine     Chronic pain     CKD (chronic kidney disease) stage 4, GFR 15-29 ml/min     [facility]    CKD (chronic kidney disease) stage 4, GFR 15-29 ml/min     Diabetes mellitus     Long term use of Insulin, Diabetic Neuropathy    Dialysis patient 1/21/2022    Fibromyalgia     Hydronephrosis     Hyperlipidemia     Hypertension 12/12/2012    Hypothyroidism 12/12/2012    ARON (iron deficiency anemia)     Insomnia     Levoscoliosis     Malignant neoplasm of upper-outer quadrant of left breast in female, estrogen receptor positive     Metabolic acidosis     Mobility impaired     Nuclear sclerosis - Both Eyes 3/24/2014    VIJAYA (obstructive sleep apnea)     Osteopenia     Pulmonary nodule     Recurrent UTI     Renal manifestation of secondary diabetes mellitus     Secondary  hyperparathyroidism, renal     Urinary retention        Past Surgical History:   Procedure Laterality Date    AV FISTULA PLACEMENT Left 2/14/2024    Procedure: CREATION, AV FISTULA;  Surgeon: RODRIGO Friedman III, MD;  Location: [facility] OR 2ND FLR;  Service: Vascular;  Laterality: Left;  LUE AVF creation vs AVG insertion    BIOPSY OF URETER Left 2/18/2022    Procedure: BIOPSY, URETER;  Surgeon: Ronel Whittington MD;  Location: [facility] OR 1ST FLR;  Service: Urology;  Laterality: Left;    BREAST BIOPSY Right     benign    CHOLECYSTECTOMY      COLONOSCOPY N/A 1/13/2017    Procedure: COLONOSCOPY;  Surgeon: Morris Wiseman MD;  Location: [facility] ENDO (4TH FLR);  Service: Endoscopy;  Laterality: N/A;  Renal pt Nephrology advised to avoid phosphate preps    COLONOSCOPY N/A 12/7/2023    Procedure: COLONOSCOPY;  Surgeon: Herve Allen MD;  Location: [facility] ENDO (2ND FLR);  Service: Endoscopy;  Laterality: N/A;  HD MWF; labs prior  suprapubic catheter  pt does not ambulate-uses christina lift- will have sling with her  9/7 ref. by Anastasiia Campbell DO, PEG, instr. mailed, Eliquis hold approval pending-[facility] to hold Eliquis 2 days per Dr Navarro-GT  1/30-precall complete-MS    CYSTOSCOPY N/A 10/8/2018    Procedure: CYSTOSCOPY;  Surgeon: Ronel Whittington MD;  [facility];  Service: Urology;  Laterality: N/A;  45 min    CYSTOSCOPY N/A 3/25/2019    Procedure: CYSTOSCOPY;  Surgeon: Ronel Whittington MD;  [facility];  Service: Urology;  Laterality: N/A;  45 min    CYSTOSCOPY N/A 8/26/2019    Procedure: CYSTOSCOPY;  Surgeon: Ronel Whittington MD;  [facility];  Service: Urology;  Laterality: N/A;  45 min    CYSTOSCOPY N/A 7/2/2021    Procedure: CYSTOSCOPY;  Surgeon: Ronel Whittington MD;  [facility];  Service: Urology;  Laterality: N/A;    CYSTOSCOPY  12/23/2021    Procedure: CYSTOSCOPY;  Surgeon: Ronel Whittington MD;  Location: [facility] OR [address] [place] [address];   Service: Urology;;    CYSTOSCOPY  2/18/2022    Procedure: CYSTOSCOPY;  Surgeon: Ronel Whittington MD;  Location: [facility] OR [address] [place] [address];  Service: Urology;;    CYSTOSCOPY W/ URETERAL STENT PLACEMENT Left 5/15/2021    Procedure: CYSTOSCOPY, WITH URETERAL STENT INSERTION;  Surgeon: Levy Sánchez Jr., MD;  Location: [facility] OR [address] [place] [address];  Service: Urology;  Laterality: Left;    CYSTOSCOPY WITH BIOPSY OF BLADDER N/A 1/27/2020    Procedure: CYSTOSCOPY, WITH BLADDER BIOPSY, WITH FULGURATION IF INDICATED;  Surgeon: Ronel Whittington MD;  Location: [facility] OR [address] [place] [address];  Service: Urology;  Laterality: N/A;    DILATION AND CURETTAGE OF UTERUS      GALLBLADDER SURGERY  2006    HYSTERECTOMY      INJECTION FOR SENTINEL NODE IDENTIFICATION Left 8/1/2019    Procedure: INJECTION, FOR SENTINEL NODE IDENTIFICATION;  Surgeon: Samia Fulton MD;  Location: [address];  Service: General;  Laterality: Left;    INJECTION OF BOTULINUM TOXIN TYPE A N/A 10/8/2018    Procedure: INJECTION, BOTULINUM TOXIN, TYPE A 300 UNITS;  Surgeon: Ronel Whittington MD;  Location: [facility] OR [address] [place] [address];  Service: Urology;  Laterality: N/A;    INJECTION OF BOTULINUM TOXIN TYPE A N/A 3/25/2019    Procedure: INJECTION, BOTULINUM TOXIN, TYPE A 300 UNITS;  Surgeon: Ronel Whittington MD;  Location: [address];  Service: Urology;  Laterality: N/A;    INJECTION OF BOTULINUM TOXIN TYPE A N/A 8/26/2019    Procedure: INJECTION, BOTULINUM TOXIN, TYPE A 300 UNITS;  Surgeon: Ronel Whittington MD;  Location: [facility] OR [address] [place] [address];  Service: Urology;  Laterality: N/A;    MASTECTOMY Left 8/1/2019    Procedure: MASTECTOMY 23 hour stay;  Surgeon: Samia Fulton MD;  Location: [facility] OR [address] [place] [address];  Service: General;  Laterality: Left;    MEDIPORT REMOVAL Right 6/24/2022    Procedure: REMOVAL, CATHETER, CENTRAL VENOUS, TUNNELED, WITH PORT;  Surgeon: Isauro Anderson MD;  Location: [facility] CATH LAB;  Service: Radiology;  Laterality: Right;    OVARIAN CYST SURGERY  1985     "REPLACEMENT OF STENT Left 12/23/2021    Procedure: REPLACEMENT, STENT;  Surgeon: Ronel Whittington MD;  Location: [facility] OR 1ST FLR;  Service: Urology;  Laterality: Left;    REPLACEMENT OF STENT Left 2/18/2022    Procedure: REPLACEMENT, STENT;  Surgeon: Ronel Whittington MD;  Location: [facility] OR 1ST FLR;  Service: Urology;  Laterality: Left;    RETROGRADE PYELOGRAPHY Left 2/18/2022    Procedure: PYELOGRAM, RETROGRADE;  Surgeon: Ronel Whittington MD;  Location: [facility] OR 1ST FLR;  Service: Urology;  Laterality: Left;    SENTINEL LYMPH NODE BIOPSY Left 8/1/2019    Procedure: BIOPSY, LYMPH NODE, SENTINEL;  Surgeon: Samia Fulton MD;  Location: [facility] OR 2ND FLR;  Service: General;  Laterality: Left;    spt placement      TONSILLECTOMY, ADENOIDECTOMY      TOTAL ABDOMINAL HYSTERECTOMY W/ BILATERAL SALPINGOOPHORECTOMY  1985    URETEROSCOPY Left 2/18/2022    Procedure: URETEROSCOPY;  Surgeon: Ronel Whittington MD;  Location: [facility] OR 1ST FLR;  Service: Urology;  Laterality: Left;         Current Outpatient Medications:     anastrozole (ARIMIDEX) 1 mg Tab, TAKE 1 TABLET BY MOUTH EVERY DAY, Disp: 90 tablet, Rfl: 2    apixaban (ELIQUIS) 5 mg Tab, Take 1 tablet (5 mg total) by mouth Daily., Disp: , Rfl:     BD INSULIN SYRINGE ULTRA-FINE 0.5 mL 31 gauge x 5/16" Syrg, USE WITH INSULIN 4 TIMES A DAY, Disp: 100 each, Rfl: 12    calcium acetate,phosphat bind, (PHOSLO) 667 mg capsule, Take by mouth 3 (three) times daily., Disp: , Rfl:     calcium acetate,phosphat bind, (PHOSLO) 667 mg tablet, Take 1 tablet (667 mg total) by mouth 3 (three) times daily with meals., Disp: 90 tablet, Rfl: 11    carvediloL (COREG) 6.25 MG tablet, Take 1 tablet (6.25 mg total) by mouth 2 (two) times daily., Disp: 60 tablet, Rfl: 11    DULoxetine (CYMBALTA) 20 MG capsule, Take 2 capsules (40 mg total) by mouth once daily., Disp: 180 capsule, Rfl: 1    erenumab-aooe (AIMOVIG AUTOINJECTOR) 140 mg/mL autoinjector, 140 mg MONTHLY (route: " "subcutaneous), Disp: 1 each, Rfl: 11    ergocalciferol (ERGOCALCIFEROL) 50,000 unit Cap, TAKE ONE CAPSULE BY MOUTH ONE TIME PER WEEK, TAKES ON TUESDAYS, Disp: 12 capsule, Rfl: 3    furosemide (LASIX) 40 MG tablet, Take 1 tablet (40 mg total) by mouth once daily., Disp: 30 tablet, Rfl: 11    HYDROcodone-acetaminophen (NORCO)  mg per tablet, Take 1 tablet by mouth every 6 (six) hours as needed for Pain., Disp: 120 tablet, Rfl: 0    insulin (LANTUS SOLOSTAR U-100 INSULIN) glargine 100 units/mL SubQ pen, INJECT 14-15 UNITS UNDER THE SKIN ONCE DAILY (Patient taking differently: 15 Units every evening. INJECT 14-16 UNITS UNDER THE SKIN ONCE DAILY), Disp: 15 each, Rfl: 3    losartan (COZAAR) 100 MG tablet, Take 1 tablet (100 mg total) by mouth once daily., Disp: 90 tablet, Rfl: 3    magnesium oxide (MAG-OX) 400 mg (241.3 mg magnesium) tablet, Take 1 tablet (400 mg total) by mouth once daily., Disp: 30 tablet, Rfl: 2    methocarbamoL (ROBAXIN) 750 MG Tab, TAKE 1-2 TABLETS (750-1,500 MG TOTAL) BY MOUTH 3 (THREE) TIMES DAILY AS NEEDED (MUSCLE SPASMS)., Disp: 180 tablet, Rfl: 1    oxybutynin (DITROPAN-XL) 10 MG 24 hr tablet, TAKE 1 TABLET BY MOUTH EVERY DAY, Disp: 90 tablet, Rfl: 4    pen needle, diabetic (BD ULTRA-FINE SHORT PEN NEEDLE) 31 gauge x 5/16" Ndle, USE WITH LANTUS DAILY, e 11.65, Disp: 100 each, Rfl: 3    sucroferric oxyhydroxide (VELPHORO) 500 mg Chew, Take 500 mg by mouth 3 (three) times daily with meals. Take 1 tablet with meals. Do not exceed 6 pills daily, Disp: , Rfl:     SYNTHROID 50 mcg tablet, TAKE 1 TABLET BY MOUTH BEFORE BREAKFAST. ADMINISTER ON AN EMPTY STOMACH AT LEAST 30 MINUTES BEFORE FOOD. IF RECEIVING TUBE FEEDS, HOLD TUBE FEEDS FOR 1 HOUR BEFORE AND AFTER LEVOTHYROXINE ADMINISTRATION., Disp: 90 tablet, Rfl: 2    tenapanor 30 mg Tab, Take 30 mg by mouth 2 (two) times a day. Take 1 tablet in the morning with meal and 1 tablet in the afternoon with meal, Disp: , Rfl:     traZODone (DESYREL) 100 " MG tablet, TAKE 1 TABLET BY MOUTH NIGHTLY AS NEEDED FOR INSOMNIA., Disp: 90 tablet, Rfl: 2    atorvastatin (LIPITOR) 80 MG tablet, Take 1 tablet (80 mg total) by mouth once daily., Disp: 90 tablet, Rfl: 3    sumatriptan (IMITREX) 100 MG tablet, Take 1 tablet (100 mg total) by mouth 2 (two) times daily as needed for Migraine (Do not take more than 2 in 24 hours or 3 in a week)., Disp: 30 tablet, Rfl: 1  No current facility-administered medications for this visit.    Facility-Administered Medications Ordered in Other Visits:     heparin (porcine) injection 2,000 Units, 2,000 Units, Intravenous, Once, Emmanuel Hare Jr., MD    Review of patient's allergies indicates:  No Known Allergies    Family History   Problem Relation Age of Onset    Diabetes Sister     Kidney disease Sister         CKD III    ALS Mother         d.    Cancer Maternal Grandmother         d. colon    Cancer Paternal Grandfather         d. lung    Scoliosis Brother         increased pain    Prostate cancer Brother         cured s/p surgery    Cancer Brother         rare cancer that got into the bones    Diabetes Maternal Aunt     Kidney disease Maternal Aunt     Diabetes Maternal Uncle     Amblyopia Neg Hx     Blindness Neg Hx     Cataracts Neg Hx     Glaucoma Neg Hx     Macular degeneration Neg Hx     Retinal detachment Neg Hx     Strabismus Neg Hx        Social History     Tobacco Use    Smoking status: Never    Smokeless tobacco: Never   Substance Use Topics    Alcohol use: No     Alcohol/week: 0.0 standard drinks of alcohol    Drug use: No     PHYSICAL EXAM:   BP (!) 95/51 (BP Location: Right arm, Patient Position: Sitting, BP Method: Medium (Automatic))   Pulse 71   Temp 98.3 °F (36.8 °C) (Oral)   Constitutional:  Alert,   Well-appearing  In no distress.  Traveling by wheelchair   Neurological: Normal speech  no focal findings  CN II - XII grossly intact.    Psychiatric: Mood and affect appropriate and symmetric.   HEENT: Normocephalic /  atraumatic  PERRLA  Midline trachea  No scars across the neck   Cardiac: Regular rate and rhythm.   Pulmonary: Normal pulmonary effort.   Abdomen: Soft, not distended.     Skin: Warm and well perfused.    Vascular:  2+ left radial pulse.  Stable   Extremities/  Musculoskeletal: Left arm:  Left brachiocephalic AV fistula has significant pulsatility in the 1st 1-2 cm then appear thrill    No arm swelling     IMAGING:  Duplex of the AV fistula shows to be patent with a potential para anastomotic stenosis with a velocity of 632.  Flow volume is 814 (prior 774  Diameter 7.8 (prior 7.1 proximal, 8.0 (prior 6.8 mid, 5.3 (prior 6.7 distal  Depth  2.(prior 0 4.6 proximal, 8.3 (prior 5.3 mid, 19.8 (prior 8.5 distal      IMPRESSION:    Surprisingly, the depth appears deeper today.  Flow volume is stable, but the para anastomotic pulsatility seems more pronounced by clinical exam.  Intervention is warranted    2.  Narcotic dependence for chronic back pain.       PLAN:   Left transradial fistulogram and para anastomotic intervention 04/24/2024  Hybrid OR 11 case    I have explained the risks, benefits and alternatives for this procedure in detail.  The patient voices understanding and all questions have be answered, and agrees to proceed with the procedure.     WALE Friedman III, MD, FACS  Professor and Chief, Vascular and Endovascular Surgery      CC; Dr Fulton

## 2024-04-09 NOTE — PROGRESS NOTES
VASCULAR SURGERY SERVICE    REFERRING DOCTOR: Lavinia Nieto NP     CHIEF COMPLAINT:  End-stage renal disease    HISTORY OF PRESENT ILLNESS: Cecile Bowen is a 73 y.o. female with prior simple mastectomy on the left, sentinel node biopsy, no axillary node dissection, with end-stage renal disease times 3 months via right IJ PermCath who is sent for permanent dialysis access.  She is currently dialyzing only Mondays and Fridays.    She is right-handed.  She has no history of AICD or pacemaker placement.  She does have known bilateral carpal tunnel syndrome with the occasional numbness in her hands but has not had a release.    She takes Eliquis 5 mg but only once a day because of easy bruising.  She is on clear why she is on Eliquis    Finally she takes chronic narcotics for back pain    3/5/2024:  This is initial follow-up after left brachiocephalic AV fistula creation 02/14/2024.  She is without complaint specifically no hand pain weakness or numbness    04/09/2024:  She now returns.  She is dialyzing on Mondays and Fridays only.  She denies any hand pain weakness or numbness    Past Medical History:   Diagnosis Date    Cervicogenic migraine     Chronic pain     CKD (chronic kidney disease) stage 4, GFR 15-29 ml/min     [facility]    CKD (chronic kidney disease) stage 4, GFR 15-29 ml/min     Diabetes mellitus     Long term use of Insulin, Diabetic Neuropathy    Dialysis patient 1/21/2022    Fibromyalgia     Hydronephrosis     Hyperlipidemia     Hypertension 12/12/2012    Hypothyroidism 12/12/2012    RAON (iron deficiency anemia)     Insomnia     Levoscoliosis     Malignant neoplasm of upper-outer quadrant of left breast in female, estrogen receptor positive     Metabolic acidosis     Mobility impaired     Nuclear sclerosis - Both Eyes 3/24/2014    VIJAYA (obstructive sleep apnea)     Osteopenia     Pulmonary nodule     Recurrent UTI     Renal manifestation of secondary diabetes mellitus     Secondary  hyperparathyroidism, renal     Urinary retention        Past Surgical History:   Procedure Laterality Date    AV FISTULA PLACEMENT Left 2/14/2024    Procedure: CREATION, AV FISTULA;  Surgeon: RODRIGO Friedman III, MD;  Location: [facility] OR 2ND FLR;  Service: Vascular;  Laterality: Left;  LUE AVF creation vs AVG insertion    BIOPSY OF URETER Left 2/18/2022    Procedure: BIOPSY, URETER;  Surgeon: Ronel Whittington MD;  Location: [facility] OR 1ST FLR;  Service: Urology;  Laterality: Left;    BREAST BIOPSY Right     benign    CHOLECYSTECTOMY      COLONOSCOPY N/A 1/13/2017    Procedure: COLONOSCOPY;  Surgeon: Morris Wiseman MD;  Location: [facility] ENDO (4TH FLR);  Service: Endoscopy;  Laterality: N/A;  Renal pt Nephrology advised to avoid phosphate preps    COLONOSCOPY N/A 12/7/2023    Procedure: COLONOSCOPY;  Surgeon: Herve Allen MD;  Location: [facility] ENDO (2ND FLR);  Service: Endoscopy;  Laterality: N/A;  HD MWF; labs prior  suprapubic catheter  pt does not ambulate-uses christina lift- will have sling with her  9/7 ref. by Anastasiia Campbell DO, PEG, instr. mailed, Eliquis hold approval pending-[facility] to hold Eliquis 2 days per Dr Navarro-GT  1/30-precall complete-MS    CYSTOSCOPY N/A 10/8/2018    Procedure: CYSTOSCOPY;  Surgeon: Ronel Whittington MD;  [facility];  Service: Urology;  Laterality: N/A;  45 min    CYSTOSCOPY N/A 3/25/2019    Procedure: CYSTOSCOPY;  Surgeon: Ronel Whittington MD;  [facility];  Service: Urology;  Laterality: N/A;  45 min    CYSTOSCOPY N/A 8/26/2019    Procedure: CYSTOSCOPY;  Surgeon: Ronel Whittington MD;  [facility];  Service: Urology;  Laterality: N/A;  45 min    CYSTOSCOPY N/A 7/2/2021    Procedure: CYSTOSCOPY;  Surgeon: Ronel Whittington MD;  [facility];  Service: Urology;  Laterality: N/A;    CYSTOSCOPY  12/23/2021    Procedure: CYSTOSCOPY;  Surgeon: Ronel Whittington MD;  Location: [facility] OR [address] [place] [address];   Service: Urology;;    CYSTOSCOPY  2/18/2022    Procedure: CYSTOSCOPY;  Surgeon: Ronel Whittington MD;  Location: [facility] OR [address] [place] [address];  Service: Urology;;    CYSTOSCOPY W/ URETERAL STENT PLACEMENT Left 5/15/2021    Procedure: CYSTOSCOPY, WITH URETERAL STENT INSERTION;  Surgeon: Levy Sánchez Jr., MD;  Location: [facility] OR [address] [place] [address];  Service: Urology;  Laterality: Left;    CYSTOSCOPY WITH BIOPSY OF BLADDER N/A 1/27/2020    Procedure: CYSTOSCOPY, WITH BLADDER BIOPSY, WITH FULGURATION IF INDICATED;  Surgeon: Ronel Whittington MD;  Location: [facility] OR [address] [place] [address];  Service: Urology;  Laterality: N/A;    DILATION AND CURETTAGE OF UTERUS      GALLBLADDER SURGERY  2006    HYSTERECTOMY      INJECTION FOR SENTINEL NODE IDENTIFICATION Left 8/1/2019    Procedure: INJECTION, FOR SENTINEL NODE IDENTIFICATION;  Surgeon: Samia Fulton MD;  Location: [address];  Service: General;  Laterality: Left;    INJECTION OF BOTULINUM TOXIN TYPE A N/A 10/8/2018    Procedure: INJECTION, BOTULINUM TOXIN, TYPE A 300 UNITS;  Surgeon: Ronel Whittington MD;  Location: [facility] OR [address] [place] [address];  Service: Urology;  Laterality: N/A;    INJECTION OF BOTULINUM TOXIN TYPE A N/A 3/25/2019    Procedure: INJECTION, BOTULINUM TOXIN, TYPE A 300 UNITS;  Surgeon: Ronel Whittington MD;  Location: [address];  Service: Urology;  Laterality: N/A;    INJECTION OF BOTULINUM TOXIN TYPE A N/A 8/26/2019    Procedure: INJECTION, BOTULINUM TOXIN, TYPE A 300 UNITS;  Surgeon: Ronel Whittington MD;  Location: [facility] OR [address] [place] [address];  Service: Urology;  Laterality: N/A;    MASTECTOMY Left 8/1/2019    Procedure: MASTECTOMY 23 hour stay;  Surgeon: Samia Fulton MD;  Location: [facility] OR [address] [place] [address];  Service: General;  Laterality: Left;    MEDIPORT REMOVAL Right 6/24/2022    Procedure: REMOVAL, CATHETER, CENTRAL VENOUS, TUNNELED, WITH PORT;  Surgeon: Isauro Anderson MD;  Location: [facility] CATH LAB;  Service: Radiology;  Laterality: Right;    OVARIAN CYST SURGERY  1985     "REPLACEMENT OF STENT Left 12/23/2021    Procedure: REPLACEMENT, STENT;  Surgeon: Ronel Whittington MD;  Location: [facility] OR 1ST FLR;  Service: Urology;  Laterality: Left;    REPLACEMENT OF STENT Left 2/18/2022    Procedure: REPLACEMENT, STENT;  Surgeon: Ronel Whittington MD;  Location: [facility] OR 1ST FLR;  Service: Urology;  Laterality: Left;    RETROGRADE PYELOGRAPHY Left 2/18/2022    Procedure: PYELOGRAM, RETROGRADE;  Surgeon: Ronel Whittington MD;  Location: [facility] OR 1ST FLR;  Service: Urology;  Laterality: Left;    SENTINEL LYMPH NODE BIOPSY Left 8/1/2019    Procedure: BIOPSY, LYMPH NODE, SENTINEL;  Surgeon: Samia Fulton MD;  Location: [facility] OR 2ND FLR;  Service: General;  Laterality: Left;    spt placement      TONSILLECTOMY, ADENOIDECTOMY      TOTAL ABDOMINAL HYSTERECTOMY W/ BILATERAL SALPINGOOPHORECTOMY  1985    URETEROSCOPY Left 2/18/2022    Procedure: URETEROSCOPY;  Surgeon: Ronel Whittington MD;  Location: [facility] OR 1ST FLR;  Service: Urology;  Laterality: Left;         Current Outpatient Medications:     anastrozole (ARIMIDEX) 1 mg Tab, TAKE 1 TABLET BY MOUTH EVERY DAY, Disp: 90 tablet, Rfl: 2    apixaban (ELIQUIS) 5 mg Tab, Take 1 tablet (5 mg total) by mouth Daily., Disp: , Rfl:     BD INSULIN SYRINGE ULTRA-FINE 0.5 mL 31 gauge x 5/16" Syrg, USE WITH INSULIN 4 TIMES A DAY, Disp: 100 each, Rfl: 12    calcium acetate,phosphat bind, (PHOSLO) 667 mg capsule, Take by mouth 3 (three) times daily., Disp: , Rfl:     calcium acetate,phosphat bind, (PHOSLO) 667 mg tablet, Take 1 tablet (667 mg total) by mouth 3 (three) times daily with meals., Disp: 90 tablet, Rfl: 11    carvediloL (COREG) 6.25 MG tablet, Take 1 tablet (6.25 mg total) by mouth 2 (two) times daily., Disp: 60 tablet, Rfl: 11    DULoxetine (CYMBALTA) 20 MG capsule, Take 2 capsules (40 mg total) by mouth once daily., Disp: 180 capsule, Rfl: 1    erenumab-aooe (AIMOVIG AUTOINJECTOR) 140 mg/mL autoinjector, 140 mg MONTHLY (route: " "subcutaneous), Disp: 1 each, Rfl: 11    ergocalciferol (ERGOCALCIFEROL) 50,000 unit Cap, TAKE ONE CAPSULE BY MOUTH ONE TIME PER WEEK, TAKES ON TUESDAYS, Disp: 12 capsule, Rfl: 3    furosemide (LASIX) 40 MG tablet, Take 1 tablet (40 mg total) by mouth once daily., Disp: 30 tablet, Rfl: 11    HYDROcodone-acetaminophen (NORCO)  mg per tablet, Take 1 tablet by mouth every 6 (six) hours as needed for Pain., Disp: 120 tablet, Rfl: 0    insulin (LANTUS SOLOSTAR U-100 INSULIN) glargine 100 units/mL SubQ pen, INJECT 14-15 UNITS UNDER THE SKIN ONCE DAILY (Patient taking differently: 15 Units every evening. INJECT 14-16 UNITS UNDER THE SKIN ONCE DAILY), Disp: 15 each, Rfl: 3    losartan (COZAAR) 100 MG tablet, Take 1 tablet (100 mg total) by mouth once daily., Disp: 90 tablet, Rfl: 3    magnesium oxide (MAG-OX) 400 mg (241.3 mg magnesium) tablet, Take 1 tablet (400 mg total) by mouth once daily., Disp: 30 tablet, Rfl: 2    methocarbamoL (ROBAXIN) 750 MG Tab, TAKE 1-2 TABLETS (750-1,500 MG TOTAL) BY MOUTH 3 (THREE) TIMES DAILY AS NEEDED (MUSCLE SPASMS)., Disp: 180 tablet, Rfl: 1    oxybutynin (DITROPAN-XL) 10 MG 24 hr tablet, TAKE 1 TABLET BY MOUTH EVERY DAY, Disp: 90 tablet, Rfl: 4    pen needle, diabetic (BD ULTRA-FINE SHORT PEN NEEDLE) 31 gauge x 5/16" Ndle, USE WITH LANTUS DAILY, e 11.65, Disp: 100 each, Rfl: 3    sucroferric oxyhydroxide (VELPHORO) 500 mg Chew, Take 500 mg by mouth 3 (three) times daily with meals. Take 1 tablet with meals. Do not exceed 6 pills daily, Disp: , Rfl:     SYNTHROID 50 mcg tablet, TAKE 1 TABLET BY MOUTH BEFORE BREAKFAST. ADMINISTER ON AN EMPTY STOMACH AT LEAST 30 MINUTES BEFORE FOOD. IF RECEIVING TUBE FEEDS, HOLD TUBE FEEDS FOR 1 HOUR BEFORE AND AFTER LEVOTHYROXINE ADMINISTRATION., Disp: 90 tablet, Rfl: 2    tenapanor 30 mg Tab, Take 30 mg by mouth 2 (two) times a day. Take 1 tablet in the morning with meal and 1 tablet in the afternoon with meal, Disp: , Rfl:     traZODone (DESYREL) 100 " MG tablet, TAKE 1 TABLET BY MOUTH NIGHTLY AS NEEDED FOR INSOMNIA., Disp: 90 tablet, Rfl: 2    atorvastatin (LIPITOR) 80 MG tablet, Take 1 tablet (80 mg total) by mouth once daily., Disp: 90 tablet, Rfl: 3    sumatriptan (IMITREX) 100 MG tablet, Take 1 tablet (100 mg total) by mouth 2 (two) times daily as needed for Migraine (Do not take more than 2 in 24 hours or 3 in a week)., Disp: 30 tablet, Rfl: 1  No current facility-administered medications for this visit.    Facility-Administered Medications Ordered in Other Visits:     heparin (porcine) injection 2,000 Units, 2,000 Units, Intravenous, Once, Emmanuel Hare Jr., MD    Review of patient's allergies indicates:  No Known Allergies    Family History   Problem Relation Age of Onset    Diabetes Sister     Kidney disease Sister         CKD III    ALS Mother         d.    Cancer Maternal Grandmother         d. colon    Cancer Paternal Grandfather         d. lung    Scoliosis Brother         increased pain    Prostate cancer Brother         cured s/p surgery    Cancer Brother         rare cancer that got into the bones    Diabetes Maternal Aunt     Kidney disease Maternal Aunt     Diabetes Maternal Uncle     Amblyopia Neg Hx     Blindness Neg Hx     Cataracts Neg Hx     Glaucoma Neg Hx     Macular degeneration Neg Hx     Retinal detachment Neg Hx     Strabismus Neg Hx        Social History     Tobacco Use    Smoking status: Never    Smokeless tobacco: Never   Substance Use Topics    Alcohol use: No     Alcohol/week: 0.0 standard drinks of alcohol    Drug use: No     PHYSICAL EXAM:   BP (!) 95/51 (BP Location: Right arm, Patient Position: Sitting, BP Method: Medium (Automatic))   Pulse 71   Temp 98.3 °F (36.8 °C) (Oral)   Constitutional:  Alert,   Well-appearing  In no distress.  Traveling by wheelchair   Neurological: Normal speech  no focal findings  CN II - XII grossly intact.    Psychiatric: Mood and affect appropriate and symmetric.   HEENT: Normocephalic /  atraumatic  PERRLA  Midline trachea  No scars across the neck   Cardiac: Regular rate and rhythm.   Pulmonary: Normal pulmonary effort.   Abdomen: Soft, not distended.     Skin: Warm and well perfused.    Vascular:  2+ left radial pulse.  Stable   Extremities/  Musculoskeletal: Left arm:  Left brachiocephalic AV fistula has significant pulsatility in the 1st 1-2 cm then appear thrill    No arm swelling     IMAGING:  Duplex of the AV fistula shows to be patent with a potential para anastomotic stenosis with a velocity of 632.  Flow volume is 814 (prior 774  Diameter 7.8 (prior 7.1 proximal, 8.0 (prior 6.8 mid, 5.3 (prior 6.7 distal  Depth  2.(prior 0 4.6 proximal, 8.3 (prior 5.3 mid, 19.8 (prior 8.5 distal      IMPRESSION:    Surprisingly, the depth appears deeper today.  Flow volume is stable, but the para anastomotic pulsatility seems more pronounced by clinical exam.  Intervention is warranted    2.  Narcotic dependence for chronic back pain.       PLAN:   Left transradial fistulogram and para anastomotic intervention 04/24/2024  Hybrid OR 11 case    I have explained the risks, benefits and alternatives for this procedure in detail.  The patient voices understanding and all questions have be answered, and agrees to proceed with the procedure.     WALE Friedman III, MD, FACS  Professor and Chief, Vascular and Endovascular Surgery      CC; Dr Fulton

## 2024-04-10 RX ORDER — SODIUM CHLORIDE 9 MG/ML
INJECTION, SOLUTION INTRAVENOUS CONTINUOUS
Status: CANCELLED | OUTPATIENT
Start: 2024-04-10

## 2024-04-13 ENCOUNTER — PATIENT MESSAGE (OUTPATIENT)
Dept: PHYSICAL MEDICINE AND REHAB | Facility: CLINIC | Age: 74
End: 2024-04-13
Payer: MEDICARE

## 2024-04-13 DIAGNOSIS — M54.2 CHRONIC NECK PAIN: ICD-10-CM

## 2024-04-13 DIAGNOSIS — M79.7 FIBROMYALGIA: ICD-10-CM

## 2024-04-13 DIAGNOSIS — G89.29 CHRONIC MIDLINE LOW BACK PAIN WITHOUT SCIATICA: ICD-10-CM

## 2024-04-13 DIAGNOSIS — M54.50 CHRONIC MIDLINE LOW BACK PAIN WITHOUT SCIATICA: ICD-10-CM

## 2024-04-13 DIAGNOSIS — G89.29 CHRONIC NECK PAIN: ICD-10-CM

## 2024-04-13 NOTE — PROGRESS NOTES
[facility]  CLINIC NOTE    CC: Annual PE    Ms. Cecile Bowen is a 73 y.o. female w/ multiple active medical problems  presenting  for her annual physical.   Chronic medical problems include Cervicogenic Migraine, neurogenic bladder with suprapubic catheter, MDD, On chronic opioid, HLD, T2DM on insulin, Afib, CKD5, on dialysis, Hypthyroidism, MO, and VIJAYA    Non smoker  NonETOH      HEALTH MAINTENANCE:  Cholesterol/labs: lipid panel pending  C-scope: 12/2023-3mm hyperplastic polyp  Mammo: order placed 1/31/2023  DEXA: 10/2019, due, osteopenia ordered placed 1/30/2023  EYE exam: pending  DDS exam: edentulous    VACCINES:   TD/TDAP: due   Flu: yearly  Prevnar 2: 1/2023  Shingrix: to consider  RSV: to consider  Covid: x 3      MEDCARD: Reviewed  Review of Systems   Constitutional:  Negative for chills, fever, malaise/fatigue and weight loss.   HENT:  Negative for hearing loss.    Eyes:  Negative for photophobia.   Respiratory:  Negative for cough, sputum production and shortness of breath.    Cardiovascular:  Negative for chest pain and palpitations.   Gastrointestinal:  Negative for abdominal pain, constipation and diarrhea.   Genitourinary:  Negative for hematuria and urgency.   Musculoskeletal:  Negative for joint pain and myalgias.   Neurological:  Negative for headaches.           ADLs: no limitations, lives with sister. Sister can;t assist as much, order food daily. Has not been washing her hair x mos  Advance directive, living will: +, rec copy to chart-  Would want all health care measures taken if hope of recovery to fx   She is not interested in Palliative services  Memory: some days she feels fuzzy and can't remember as well, especially around her dialysis episodes  Mental health: No issue with depression, or anxiety   Safety: would recognize if someone trying to scam her   Gait: as baseline, in a wheelchair   Diet: roast chicken wings and pasta; piece of edelmira food cake in AM or  pancake and sausage on a stick; occ fruit veggies  Urinary incontinence: has chronic indwelling murillo , on    Remainder of review negative except as previously noted        PE:  Physical Exam  Constitutional:       Appearance: She is obese. She is ill-appearing.   HENT:      Head: Normocephalic and atraumatic.   Eyes:      General:         Right eye: No discharge.         Left eye: No discharge.   Cardiovascular:      Rate and Rhythm: Normal rate and regular rhythm.      Heart sounds: Normal heart sounds.   Pulmonary:      Effort: Pulmonary effort is normal.      Breath sounds: Normal breath sounds. No rales.   Abdominal:      General: Bowel sounds are normal.      Palpations: Abdomen is soft.      Tenderness: There is no abdominal tenderness. There is no rebound.   Musculoskeletal:         General: No deformity.      Cervical back: Normal range of motion and neck supple.      Right lower leg: Edema present.      Left lower leg: Edema present.   Skin:     General: Skin is warm and dry.      Comments: Fistula left arm; right chest wall port   Neurological:      Mental Status: She is alert and oriented to person, place, and time.      Motor: Weakness present.   Psychiatric:         Mood and Affect: Affect normal.         Judgment: Judgment normal.         IMPRESSION/PLAN:  Annual Physical    ESRD, on dialysis  -under care of Nephrology  DM, w/ neuropathy, stable  -continue Lantus 15U q hs  -continue diabetic diet  -under care of Ms. Gabriel  -foot care w/ Podiatry  HTN associated DM, stable  -continue carvedilol 6.25mg BID  -continue losartan 100mg qd  -continue furosemide  -continue low salt diet  HLD associated DM, stable  Aortic atheosclerosis  -continue atorvastatin 80mg  -continue low fat diet  -order lipid panel  Hypothyroidism, stable  -continue levothyroxine 50mcg qd  Breast cancer, stable  -continue Arimidex  -mammo pending     Secondary hyperparathyroidism, renal  -followed by renal    Sideroblastic  anemia  Lab Results   Component Value Date    IRON 61 04/05/2024    TIBC 205 04/05/2024    FERRITIN 483 (H) 04/05/2024     Lab Results   Component Value Date    FOLATE 8.2 01/05/2024     Lab Results   Component Value Date    [id number] 402 08/10/2023       Likely secondary to anemia of chronic disease    - CBC pending    Uncomplicated opioid dependence  - F/u with PM&R     Debility   Aside from her co-morbidities, pt has significant debility that impedes her from performing ADLs independently.     HM  -eye exam due, pt aware  -Mammo  -DEXA  -Rx lipid panel/A1C  -rtc one year

## 2024-04-15 DIAGNOSIS — M54.2 CHRONIC NECK PAIN: ICD-10-CM

## 2024-04-15 DIAGNOSIS — G89.29 CHRONIC MIDLINE LOW BACK PAIN WITHOUT SCIATICA: ICD-10-CM

## 2024-04-15 DIAGNOSIS — T82.858D ARTERIOVENOUS FISTULA STENOSIS, SUBSEQUENT ENCOUNTER: Primary | ICD-10-CM

## 2024-04-15 DIAGNOSIS — M79.7 FIBROMYALGIA: ICD-10-CM

## 2024-04-15 DIAGNOSIS — M54.50 CHRONIC MIDLINE LOW BACK PAIN WITHOUT SCIATICA: ICD-10-CM

## 2024-04-15 DIAGNOSIS — G89.29 CHRONIC NECK PAIN: ICD-10-CM

## 2024-04-15 RX ORDER — METHOCARBAMOL 750 MG/1
TABLET, FILM COATED ORAL
Qty: 180 TABLET | Refills: 1 | Status: CANCELLED | OUTPATIENT
Start: 2024-04-15

## 2024-04-15 RX ORDER — METHOCARBAMOL 750 MG/1
TABLET, FILM COATED ORAL
Qty: 180 TABLET | Refills: 1 | Status: SHIPPED | OUTPATIENT
Start: 2024-04-15 | End: 2024-05-24 | Stop reason: SDUPTHER

## 2024-04-15 RX ORDER — HYDROCODONE BITARTRATE AND ACETAMINOPHEN 10; 325 MG/1; MG/1
1 TABLET ORAL EVERY 6 HOURS PRN
Qty: 120 TABLET | Refills: 0 | Status: SHIPPED | OUTPATIENT
Start: 2024-04-19 | End: 2024-05-24 | Stop reason: SDUPTHER

## 2024-04-16 RX ORDER — OXYCODONE HYDROCHLORIDE 5 MG/1
5 TABLET ORAL DAILY PRN
Qty: 15 TABLET | Refills: 0 | Status: SHIPPED | OUTPATIENT
Start: 2024-04-16 | End: 2024-05-01

## 2024-04-17 ENCOUNTER — OFFICE VISIT (OUTPATIENT)
Dept: INTERNAL MEDICINE | Facility: CLINIC | Age: 74
End: 2024-04-17
Payer: MEDICARE

## 2024-04-17 VITALS
RESPIRATION RATE: 16 BRPM | BODY MASS INDEX: 33.34 KG/M2 | TEMPERATURE: 98 F | DIASTOLIC BLOOD PRESSURE: 70 MMHG | HEIGHT: 68 IN | HEART RATE: 68 BPM | SYSTOLIC BLOOD PRESSURE: 138 MMHG | WEIGHT: 220 LBS

## 2024-04-17 DIAGNOSIS — E11.40 TYPE 2 DIABETES MELLITUS WITH DIABETIC NEUROPATHY, WITH LONG-TERM CURRENT USE OF INSULIN: ICD-10-CM

## 2024-04-17 DIAGNOSIS — I15.2 HYPERTENSION ASSOCIATED WITH DIABETES: Chronic | ICD-10-CM

## 2024-04-17 DIAGNOSIS — Z79.4 TYPE 2 DIABETES MELLITUS WITH DIABETIC NEUROPATHY, WITH LONG-TERM CURRENT USE OF INSULIN: ICD-10-CM

## 2024-04-17 DIAGNOSIS — E11.59 HYPERTENSION ASSOCIATED WITH DIABETES: Chronic | ICD-10-CM

## 2024-04-17 DIAGNOSIS — Z00.00 ANNUAL PHYSICAL EXAM: Primary | ICD-10-CM

## 2024-04-17 DIAGNOSIS — Z99.2 ESRD (END STAGE RENAL DISEASE) ON DIALYSIS: ICD-10-CM

## 2024-04-17 DIAGNOSIS — N18.6 ESRD (END STAGE RENAL DISEASE) ON DIALYSIS: ICD-10-CM

## 2024-04-17 DIAGNOSIS — E11.69 HYPERLIPIDEMIA ASSOCIATED WITH TYPE 2 DIABETES MELLITUS: ICD-10-CM

## 2024-04-17 DIAGNOSIS — E78.5 HYPERLIPIDEMIA ASSOCIATED WITH TYPE 2 DIABETES MELLITUS: ICD-10-CM

## 2024-04-17 PROCEDURE — 1101F PT FALLS ASSESS-DOCD LE1/YR: CPT | Mod: HCNC,CPTII,S$GLB, | Performed by: INTERNAL MEDICINE

## 2024-04-17 PROCEDURE — 3008F BODY MASS INDEX DOCD: CPT | Mod: HCNC,CPTII,S$GLB, | Performed by: INTERNAL MEDICINE

## 2024-04-17 PROCEDURE — 3288F FALL RISK ASSESSMENT DOCD: CPT | Mod: HCNC,CPTII,S$GLB, | Performed by: INTERNAL MEDICINE

## 2024-04-17 PROCEDURE — 3075F SYST BP GE 130 - 139MM HG: CPT | Mod: HCNC,CPTII,S$GLB, | Performed by: INTERNAL MEDICINE

## 2024-04-17 PROCEDURE — 1125F AMNT PAIN NOTED PAIN PRSNT: CPT | Mod: HCNC,CPTII,S$GLB, | Performed by: INTERNAL MEDICINE

## 2024-04-17 PROCEDURE — 3066F NEPHROPATHY DOC TX: CPT | Mod: HCNC,CPTII,S$GLB, | Performed by: INTERNAL MEDICINE

## 2024-04-17 PROCEDURE — 99999 PR PBB SHADOW E&M-EST. PATIENT-LVL IV: CPT | Mod: PBBFAC,HCNC,, | Performed by: INTERNAL MEDICINE

## 2024-04-17 PROCEDURE — 3078F DIAST BP <80 MM HG: CPT | Mod: HCNC,CPTII,S$GLB, | Performed by: INTERNAL MEDICINE

## 2024-04-17 PROCEDURE — 99397 PER PM REEVAL EST PAT 65+ YR: CPT | Mod: HCNC,S$GLB,, | Performed by: INTERNAL MEDICINE

## 2024-04-17 PROCEDURE — 1159F MED LIST DOCD IN RCRD: CPT | Mod: HCNC,CPTII,S$GLB, | Performed by: INTERNAL MEDICINE

## 2024-04-17 PROCEDURE — 4010F ACE/ARB THERAPY RXD/TAKEN: CPT | Mod: HCNC,CPTII,S$GLB, | Performed by: INTERNAL MEDICINE

## 2024-04-17 RX ORDER — ATORVASTATIN CALCIUM 80 MG/1
80 TABLET, FILM COATED ORAL DAILY
Qty: 90 TABLET | Refills: 3 | Status: SHIPPED | OUTPATIENT
Start: 2024-04-17 | End: 2025-04-17

## 2024-04-18 ENCOUNTER — DOCUMENT SCAN (OUTPATIENT)
Dept: HOME HEALTH SERVICES | Facility: HOSPITAL | Age: 74
End: 2024-04-18
Payer: MEDICARE

## 2024-04-18 ENCOUNTER — EXTERNAL HOME HEALTH (OUTPATIENT)
Dept: HOME HEALTH SERVICES | Facility: HOSPITAL | Age: 74
End: 2024-04-18
Payer: MEDICARE

## 2024-04-22 DIAGNOSIS — N31.9 NEUROGENIC BLADDER: Primary | ICD-10-CM

## 2024-04-22 DIAGNOSIS — Z43.5 ENCOUNTER FOR CARE OR REPLACEMENT OF SUPRAPUBIC TUBE: ICD-10-CM

## 2024-04-22 DIAGNOSIS — Z74.09 IMMOBILITY: ICD-10-CM

## 2024-04-22 NOTE — PROGRESS NOTES
She is a diabetic female with history of bilateral hydronephrosis, incomplete bladder emptying which is managed by SPT (16fr) > 8 years.     Home health orders for monthly SPT changes.

## 2024-04-23 ENCOUNTER — TELEPHONE (OUTPATIENT)
Dept: VASCULAR SURGERY | Facility: CLINIC | Age: 74
End: 2024-04-23
Payer: MEDICARE

## 2024-04-24 ENCOUNTER — TELEPHONE (OUTPATIENT)
Dept: VASCULAR SURGERY | Facility: CLINIC | Age: 74
End: 2024-04-24
Payer: MEDICARE

## 2024-04-24 NOTE — TELEPHONE ENCOUNTER
Spoke to the pt, she canceled surgery due to transportation. I removed case from scheduled, text Dr. Friedman to inform him. Pt would like to schedule case for a late morning, start possibly or afternoon.

## 2024-04-26 ENCOUNTER — TELEPHONE (OUTPATIENT)
Dept: VASCULAR SURGERY | Facility: CLINIC | Age: 74
End: 2024-04-26
Payer: MEDICARE

## 2024-04-28 PROCEDURE — G0179 MD RECERTIFICATION HHA PT: HCPCS | Mod: ,,, | Performed by: STUDENT IN AN ORGANIZED HEALTH CARE EDUCATION/TRAINING PROGRAM

## 2024-04-29 ENCOUNTER — HOSPITAL ENCOUNTER (OUTPATIENT)
Facility: HOSPITAL | Age: 74
Discharge: HOME | End: 2024-04-29
Attending: SURGERY | Admitting: SURGERY
Payer: MEDICARE

## 2024-04-29 VITALS
HEIGHT: 68 IN | TEMPERATURE: 98 F | WEIGHT: 220 LBS | SYSTOLIC BLOOD PRESSURE: 157 MMHG | OXYGEN SATURATION: 98 % | BODY MASS INDEX: 33.34 KG/M2 | HEART RATE: 57 BPM | RESPIRATION RATE: 20 BRPM | DIASTOLIC BLOOD PRESSURE: 69 MMHG

## 2024-04-29 DIAGNOSIS — N18.6 ESRD (END STAGE RENAL DISEASE): ICD-10-CM

## 2024-04-29 DIAGNOSIS — T82.858D ARTERIOVENOUS FISTULA STENOSIS, SUBSEQUENT ENCOUNTER: ICD-10-CM

## 2024-04-29 LAB
POCT GLUCOSE: 162 MG/DL (ref 70–110)
POCT GLUCOSE: 173 MG/DL (ref 70–110)

## 2024-04-29 PROCEDURE — 82962 GLUCOSE BLOOD TEST: CPT | Mod: HCNC | Performed by: SURGERY

## 2024-04-29 PROCEDURE — C1894 INTRO/SHEATH, NON-LASER: HCPCS | Mod: HCNC | Performed by: SURGERY

## 2024-04-29 PROCEDURE — C1769 GUIDE WIRE: HCPCS | Mod: HCNC | Performed by: SURGERY

## 2024-04-29 PROCEDURE — 99153 MOD SED SAME PHYS/QHP EA: CPT | Mod: HCNC | Performed by: SURGERY

## 2024-04-29 PROCEDURE — 36902 INTRO CATH DIALYSIS CIRCUIT: CPT | Mod: HCNC | Performed by: SURGERY

## 2024-04-29 PROCEDURE — 27201423 OPTIME MED/SURG SUP & DEVICES STERILE SUPPLY: Mod: HCNC | Performed by: SURGERY

## 2024-04-29 PROCEDURE — C1725 CATH, TRANSLUMIN NON-LASER: HCPCS | Mod: HCNC | Performed by: SURGERY

## 2024-04-29 PROCEDURE — 63600175 PHARM REV CODE 636 W HCPCS: Mod: HCNC | Performed by: SURGERY

## 2024-04-29 PROCEDURE — 25500020 PHARM REV CODE 255: Mod: HCNC | Performed by: SURGERY

## 2024-04-29 PROCEDURE — 36902 INTRO CATH DIALYSIS CIRCUIT: CPT | Mod: 78,HCNC,, | Performed by: SURGERY

## 2024-04-29 PROCEDURE — 99152 MOD SED SAME PHYS/QHP 5/>YRS: CPT | Mod: HCNC,,, | Performed by: SURGERY

## 2024-04-29 PROCEDURE — 25000003 PHARM REV CODE 250: Mod: HCNC | Performed by: SURGERY

## 2024-04-29 PROCEDURE — 99152 MOD SED SAME PHYS/QHP 5/>YRS: CPT | Mod: HCNC | Performed by: SURGERY

## 2024-04-29 RX ORDER — HEPARIN SODIUM 1000 [USP'U]/ML
INJECTION, SOLUTION INTRAVENOUS; SUBCUTANEOUS
Status: DISCONTINUED | OUTPATIENT
Start: 2024-04-29 | End: 2024-04-29 | Stop reason: HOSPADM

## 2024-04-29 RX ORDER — LIDOCAINE HYDROCHLORIDE 20 MG/ML
INJECTION, SOLUTION EPIDURAL; INFILTRATION; INTRACAUDAL; PERINEURAL
Status: DISCONTINUED | OUTPATIENT
Start: 2024-04-29 | End: 2024-04-29 | Stop reason: HOSPADM

## 2024-04-29 RX ORDER — ONDANSETRON 8 MG/1
8 TABLET, ORALLY DISINTEGRATING ORAL EVERY 8 HOURS PRN
Status: DISCONTINUED | OUTPATIENT
Start: 2024-04-29 | End: 2024-04-29 | Stop reason: HOSPADM

## 2024-04-29 RX ORDER — ACETAMINOPHEN 325 MG/1
650 TABLET ORAL EVERY 4 HOURS PRN
Status: DISCONTINUED | OUTPATIENT
Start: 2024-04-29 | End: 2024-04-29 | Stop reason: HOSPADM

## 2024-04-29 RX ORDER — FENTANYL CITRATE 50 UG/ML
INJECTION, SOLUTION INTRAMUSCULAR; INTRAVENOUS
Status: DISCONTINUED | OUTPATIENT
Start: 2024-04-29 | End: 2024-04-29 | Stop reason: HOSPADM

## 2024-04-29 RX ORDER — IODIXANOL 320 MG/ML
INJECTION, SOLUTION INTRAVASCULAR
Status: DISCONTINUED | OUTPATIENT
Start: 2024-04-29 | End: 2024-04-29 | Stop reason: HOSPADM

## 2024-04-29 RX ORDER — MIDAZOLAM HYDROCHLORIDE 1 MG/ML
INJECTION, SOLUTION INTRAMUSCULAR; INTRAVENOUS
Status: DISCONTINUED | OUTPATIENT
Start: 2024-04-29 | End: 2024-04-29 | Stop reason: HOSPADM

## 2024-04-29 NOTE — NURSING
Received report from RON Breaux CL. Patient s/p fistulogram, PTA, AAOx3. VSS, no c/o pain or discomfort at this time, resp even and unlabored. TR band to left wrist intact. No active bleeding. No hematoma noted. Post procedure protocol reviewed with patient. Understanding verbalized. No Family members at bedside. Nurse call bell within reach.

## 2024-04-29 NOTE — Clinical Note
The balloon was inserted into the left AV fistula.  The balloon was inflated and removed following angioplasty. .

## 2024-04-29 NOTE — NURSING
Patient discharged per MD orders. Instructions given on medications, wound care, activity, signs of infection, when to call MD, and follow up appointments. Pt verbalized understanding.  Patient AAOx3, VSS, no c/o pain or discomfort at this time.  PIV removed. Patient left unit via wheelchair with PCT to front of  hospital to meet ride.

## 2024-04-29 NOTE — BRIEF OP NOTE
[facility] - Cath Lab  Brief Operative Note    Surgery Date: 4/29/2024     Surgeons and Role:     * RODRIGO Friedman III, MD - Primary    Assisting Surgeon: None    Pre-op Diagnosis:  Arteriovenous fistula stenosis, subsequent encounter [T82.858D]    Post-op Diagnosis:  Post-Op Diagnosis Codes:     * Arteriovenous fistula stenosis, subsequent encounter [T82.858D]    PROCEDURES:    PTA, L AVF (5x20)  L trans-radial fistulagram  US guided L radial access  Conscious Sedation    Anesthesia: RN IV Sedation    Operative Findings: >50% pre-arterial stenosis, < 30% after    Estimated Blood Loss: 5cc         Specimens:   Specimen (24h ago, onward)      None              Discharge Note    OUTCOME: successful outpatient procedure     DISPOSITION: home    FINAL DIAGNOSIS:  stenosis AVF    FOLLOWUP: 2 weeks with quantitative AVF duplex    DISCHARGE INSTRUCTIONS:  see below     aspirin 81 mg oral delayed release tablet: 1 tab(s) orally once a day  atorvastatin 80 mg oral tablet: 1 tab(s) orally once a day (at bedtime)  Bumex 2 mg oral tablet: 1 tab(s) orally 2 times a day  clopidogrel 75 mg oral tablet: 1 tab(s) orally once a day  HumaLOG KwikPen 100 units/mL injectable solution: 18 unit(s) subcutaneous 3 times a day (before meals)  hydrOXYzine hydrochloride 25 mg oral tablet: 1 tab(s) orally 2 times a day  Lantus Solostar Pen 100 units/mL subcutaneous solution: 60 unit(s) subcutaneous once a day (at bedtime)  levothyroxine 50 mcg (0.05 mg) oral tablet: 1 tab(s) orally once a day  loratadine 10 mg oral tablet: 1 tab(s) orally once a day  melatonin 3 mg oral tablet: 1 tab(s) orally once a day (at bedtime)  metoprolol: 12.5 milligram(s) orally 2 times a day  midodrine 10 mg oral tablet: 1 tab(s) orally every 8 hours  senna oral tablet: 2 tab(s) orally once a day (at bedtime)   aspirin 81 mg oral delayed release tablet: 1 tab(s) orally once a day  atorvastatin 80 mg oral tablet: 1 tab(s) orally once a day (at bedtime)  Bumex 2 mg oral tablet: 1 tab(s) orally 2 times a day  clopidogrel 75 mg oral tablet: 1 tab(s) orally once a day  HumaLOG KwikPen 100 units/mL injectable solution: 18 unit(s) subcutaneous 3 times a day (before meals)  hydrOXYzine hydrochloride 25 mg oral tablet: 1 tab(s) orally 2 times a day  Lantus Solostar Pen 100 units/mL subcutaneous solution: 60 unit(s) subcutaneous once a day (at bedtime)  levothyroxine 50 mcg (0.05 mg) oral tablet: 1 tab(s) orally once a day  melatonin 3 mg oral tablet: 1 tab(s) orally once a day (at bedtime)  metoprolol: 12.5 milligram(s) orally 2 times a day  midodrine 10 mg oral tablet: 1 tab(s) orally every 8 hours  senna oral tablet: 2 tab(s) orally once a day (at bedtime)   ALPRAZolam 0.25 mg oral tablet: 1 tab(s) orally every 8 hours, As needed, anxiety  aspirin 81 mg oral delayed release tablet: 1 tab(s) orally once a day  atorvastatin 80 mg oral tablet: 1 tab(s) orally once a day (at bedtime)  bumetanide 1 mg oral tablet: 1 tab(s) orally once a day at 6PM  bumetanide 2 mg oral tablet: 1 tab(s) orally once a day at 6AM  clopidogrel 75 mg oral tablet: 1 tab(s) orally once a day  hydrALAZINE: 37.5 milligram(s) orally every 8 hours  insulin glargine: 25 unit(s) subcutaneous once a day in the morning  insulin glargine: 45 unit(s) subcutaneous once a day (at bedtime)  insulin lispro 100 units/mL injectable solution: 16 unit(s) subcutaneous 3 times a day (before meals)  levothyroxine 50 mcg (0.05 mg) oral tablet: 1 tab(s) orally once a day  melatonin 3 mg oral tablet: 1 tab(s) orally once a day (at bedtime)  metoprolol succinate 100 mg oral tablet, extended release: 1 tab(s) orally every 12 hours  polyethylene glycol 3350 oral powder for reconstitution: 17 gram(s) orally 2 times a day, As needed, Constipation  senna oral tablet: 2 tab(s) orally once a day (at bedtime)  sodium bicarbonate 650 mg oral tablet: 1 tab(s) orally once a day

## 2024-04-29 NOTE — DISCHARGE INSTRUCTIONS
Once you go home, please abide by the following restrictions:    Rest in bed or a recliner for the remainder of the day following the procedure.      You should not go home alone the day of the procedure.    Lifting and activity restrictions depend on the insertion site for the procedure:  - Wrist or hand:   - Do not lift more than 5 pounds for the first 24 hours and do not lift more than 10 pounds with the affected arm for 1 week.   - Do not use affected arm for pulling up in bed, adjusting position, or weight bearing exercise.   - Avoid flexing the wrist, such as hammering, playing tennis, or swinging objects.   - Avoid moisture or submerging hand for 7 days (tub, swimming, dishes). Showering is OK.  - You may return to your usual activity after the two days.   - Do not take a hot bath or shower for at least 12 hours after discharge.    Keep an adhesive bandage on the catheter insertion site for one day to help prevent infection.    Because of the sedation you were given for your procedure, we recommend that you have someone stay with you overnight following your procedure.  - Do not drive a car or operate machinery for the remainder of the day.  - Do not consume any alcoholic beverages for the remainder of the day.  - Postpone signing any important papers or making any important decision for the remainder of the day.  - Do not take any muscle relaxants, sedatives, hypnotics, or mood-altering medication today unless ordered by your physician who is aware you are taking the medication today.    If bleeding occurs:  - Apply manual pressure, and immediately seek medical attention at the nearest hospital.   - Seek immediate medical attention if you experience loss of sensation, redness, swelling or discharge from the insertion site.  
Negative

## 2024-04-29 NOTE — INTERVAL H&P NOTE
Interval History and Physical    The patient has been examined and the H&P has been reviewed:     I concur with the findings and no changes have occurred since H&P was written.     Procedure risks, benefits and alternative options discussed and understood by patient/family.      Rory Land MD PGY-4  Vascular Medicine Fellow

## 2024-04-29 NOTE — PLAN OF CARE
Patient arrived to room. PIV placed. Admit assessment completed. Plan of care discussed with patient. Call light in reach.

## 2024-04-29 NOTE — OP NOTE
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  This is impeding its full maturation.      Procedure:  Patient was brought to cath lab placed in supine position.  After sterile prep and drape infiltration with 1% lidocaine, the left radial artery was accessed using ultrasound guidance with a micropuncture needle.  A wire was passed.  The patient was then systemically heparinized waited for 3 minutes, and then a slender 5 6 sheath placed.  Through the sheath a fistulogram was then performed demonstrating a greater than 50% para anastomotic stenosis.  The remainder of the fistula was very nicely matured.  There was no central venous stenosis.    Stiff angled Glidewire was passed through the lesion this was dilated with a 5 x 20 balloon held for 2 minutes.  There was mild wasting of the fully effaced this was taken up to 14 atmospheres proximally 5.4 mm.  Notably the brachial artery was no larger than this at this area.    Balloon was then removed post angioplasty fistulogram revealed improvement in stenosis to less than 30%.  Notably the majority of the fistula however is much larger.  There was brisk flow    All catheters and guidewires removed hemostasis was achieved with a TR band.  Patient was then taken to recovery area in  stable condition.    MODERATE SEDATION IN CATH LAB   I was present for moderate sedation, and monitored the patients cardio respiratory functions during the moderate sedation   See nurses notes for Intra-service start and end times and for the log of the name of the RN who administered the medicines for the moderate sedation, including their doseage and route.     Time of sedation:  35 minutes     WALE Friedman III, MD, FACS  Professor and Chief, Vascular and Endovascular Surgery

## 2024-05-06 ENCOUNTER — TELEPHONE (OUTPATIENT)
Dept: ADMINISTRATIVE | Facility: CLINIC | Age: 74
End: 2024-05-06
Payer: MEDICARE

## 2024-05-07 ENCOUNTER — DOCUMENT SCAN (OUTPATIENT)
Dept: HOME HEALTH SERVICES | Facility: HOSPITAL | Age: 74
End: 2024-05-07
Payer: MEDICARE

## 2024-05-08 ENCOUNTER — HOSPITAL ENCOUNTER (OUTPATIENT)
Dept: RADIOLOGY | Facility: HOSPITAL | Age: 74
Discharge: HOME | End: 2024-05-08
Attending: NURSE PRACTITIONER
Payer: MEDICARE

## 2024-05-08 DIAGNOSIS — R79.89 ELEVATED LFTS: ICD-10-CM

## 2024-05-08 PROCEDURE — 76700 US EXAM ABDOM COMPLETE: CPT | Mod: 26,HCNC,, | Performed by: RADIOLOGY

## 2024-05-08 PROCEDURE — 76700 US EXAM ABDOM COMPLETE: CPT | Mod: TC,HCNC

## 2024-05-09 ENCOUNTER — OFFICE VISIT (OUTPATIENT)
Dept: PODIATRY | Facility: CLINIC | Age: 74
End: 2024-05-09
Payer: MEDICARE

## 2024-05-09 VITALS
BODY MASS INDEX: 33.45 KG/M2 | HEIGHT: 68 IN | SYSTOLIC BLOOD PRESSURE: 135 MMHG | HEART RATE: 86 BPM | DIASTOLIC BLOOD PRESSURE: 72 MMHG

## 2024-05-09 DIAGNOSIS — L60.3 ONYCHODYSTROPHY: ICD-10-CM

## 2024-05-09 DIAGNOSIS — E11.49 TYPE II DIABETES MELLITUS WITH NEUROLOGICAL MANIFESTATIONS: Primary | ICD-10-CM

## 2024-05-09 DIAGNOSIS — I73.9 PVD (PERIPHERAL VASCULAR DISEASE): ICD-10-CM

## 2024-05-09 DIAGNOSIS — R60.0 BILATERAL LOWER EXTREMITY EDEMA: ICD-10-CM

## 2024-05-09 PROCEDURE — 99499 UNLISTED E&M SERVICE: CPT | Mod: HCNC,S$GLB,, | Performed by: PODIATRIST

## 2024-05-09 PROCEDURE — 99999 PR PBB SHADOW E&M-EST. PATIENT-LVL V: CPT | Mod: PBBFAC,HCNC,, | Performed by: PODIATRIST

## 2024-05-09 PROCEDURE — 11721 DEBRIDE NAIL 6 OR MORE: CPT | Mod: Q8,HCNC,S$GLB, | Performed by: PODIATRIST

## 2024-05-09 RX ORDER — FENTANYL CITRATE 50 UG/ML
INJECTION, SOLUTION INTRAMUSCULAR; INTRAVENOUS
Status: ON HOLD | COMMUNITY
Start: 2024-02-14 | End: 2024-06-13 | Stop reason: HOSPADM

## 2024-05-09 RX ORDER — HEPARIN SODIUM 1000 [USP'U]/ML
INJECTION, SOLUTION INTRAVENOUS; SUBCUTANEOUS
Status: ON HOLD | COMMUNITY
Start: 2024-02-14 | End: 2024-06-13 | Stop reason: HOSPADM

## 2024-05-09 RX ORDER — AMLODIPINE BESYLATE 10 MG/1
10 TABLET ORAL
COMMUNITY
Start: 2024-04-19

## 2024-05-09 RX ORDER — CEFAZOLIN 2 G/1
INJECTION, POWDER, FOR SOLUTION INTRAMUSCULAR; INTRAVENOUS
Status: ON HOLD | COMMUNITY
Start: 2024-02-14 | End: 2024-06-13 | Stop reason: HOSPADM

## 2024-05-09 RX ORDER — MIDAZOLAM HYDROCHLORIDE 1 MG/ML
INJECTION, SOLUTION INTRAMUSCULAR; INTRAVENOUS
Status: ON HOLD | COMMUNITY
Start: 2024-02-14 | End: 2024-06-13 | Stop reason: HOSPADM

## 2024-05-09 RX ORDER — SUCROFERRIC OXYHYDROXIDE 500 MG/1
TABLET, CHEWABLE ORAL
COMMUNITY
Start: 2024-05-03

## 2024-05-09 RX ORDER — PROPOFOL 10 MG/ML
INJECTION, EMULSION INTRAVENOUS
Status: ON HOLD | COMMUNITY
Start: 2024-02-14 | End: 2024-06-13 | Stop reason: HOSPADM

## 2024-05-09 RX ORDER — MEPIVACAINE HYDROCHLORIDE 15 MG/ML
INJECTION, SOLUTION EPIDURAL; INFILTRATION
Status: ON HOLD | COMMUNITY
Start: 2024-02-14 | End: 2024-06-13 | Stop reason: HOSPADM

## 2024-05-09 RX ORDER — ONDANSETRON 8 MG/1
TABLET, ORALLY DISINTEGRATING ORAL
COMMUNITY
Start: 2024-04-18

## 2024-05-09 RX ORDER — PHENYLEPHRINE HYDROCHLORIDE 10 MG/ML
INJECTION INTRAVENOUS
Status: ON HOLD | COMMUNITY
Start: 2024-02-14 | End: 2024-06-13 | Stop reason: HOSPADM

## 2024-05-09 RX ORDER — LOSARTAN POTASSIUM 50 MG/1
50 TABLET ORAL
Status: ON HOLD | COMMUNITY
Start: 2024-03-18 | End: 2024-06-13 | Stop reason: HOSPADM

## 2024-05-09 RX ORDER — OXYCODONE HYDROCHLORIDE 5 MG/1
CAPSULE ORAL
COMMUNITY
Start: 2024-04-28 | End: 2024-05-29 | Stop reason: SDUPTHER

## 2024-05-09 NOTE — PROGRESS NOTES
Subjective:      Patient ID: Cecile Bowen is a 73 y.o. female.    Chief Complaint: Diabetes Mellitus (PCP- 04/17/2024/Lurdes Navarro MD) and Nail Care    Cecile is a 73 y.o. female who presents to the clinic for evaluation and treatment of high risk feet. Cecile has a past medical history of Cervicogenic migraine, Chronic pain, CKD (chronic kidney disease) stage 4, GFR 15-29 ml/min, CKD (chronic kidney disease) stage 4, GFR 15-29 ml/min, Diabetes mellitus, Dialysis patient (1/21/2022), Fibromyalgia, Hydronephrosis, Hyperlipidemia, Hypertension (12/12/2012), Hypothyroidism (12/12/2012), ARON (iron deficiency anemia), Insomnia, Levoscoliosis, Malignant neoplasm of upper-outer quadrant of left breast in female, estrogen receptor positive, Metabolic acidosis, Mobility impaired, Nuclear sclerosis - Both Eyes (3/24/2014), VIJAYA (obstructive sleep apnea), Osteopenia, Pulmonary nodule, Recurrent UTI, Renal manifestation of secondary diabetes mellitus, Secondary hyperparathyroidism, renal, and Urinary retention. The patient's chief complaint is bilateral LE edema,  she also complains of long, thick toenails. Routine trimming helps. Has not been to clinic in 7 months due to transportation difficulties. Would like to get back on track every 3 months for routine care.  This patient has documented high risk feet requiring routine maintenance secondary to diabetes mellitis and those secondary complications of diabetes, as mentioned.    Current shoe gear socks only    Wheelchair bound    PCP: Lurdes Navarro MD    Date Last Seen by PCP:   Chief Complaint   Patient presents with    Diabetes Mellitus     PCP- 04/17/2024  Lurdes Navarro MD    Nail Care     Hemoglobin A1C   Date Value Ref Range Status   04/22/2024 6.2 (H) 4.8 - 5.9 % Final   10/06/2023 5.0 4.8 - 5.9 % Final   08/21/2023 6.3 (H) 4.8 - 5.9 % Final           Patient Active Problem List   Diagnosis    Hypothyroidism    Fibromyalgia     Intractable chronic migraine without aura    Vitamin D deficiency disease    Hyperlipidemia associated with type 2 diabetes mellitus    VIJAYA (obstructive sleep apnea)    Hyponatremia    Abnormality of gait and mobility    Osteoarthritis of lumbar spine    Recurrent urinary tract infection    Sideroblastic anemia    Iron deficiency anemia, unspecified    Primary osteoarthritis involving multiple joints    Retention of urine    Normal anion gap metabolic acidosis    Neurogenic bladder    Major depressive disorder, recurrent, moderate    Insomnia    Mixed migraine and muscle contraction headache    Secondary hyperparathyroidism, renal    Neuromuscular dysfunction of bladder, unspecified    Pulmonary nodule    Osteopenia    Class 1 obesity with serious comorbidity and body mass index (BMI) of 33.0 to 33.9 in adult    Chronic daily headache    Long term prescription opiate use    Lumbar spondylosis    Chronic pain    Cervicogenic migraine    Malignant neoplasm of left breast, estrogen receptor positive    Risk for falls    Facet arthritis of lumbar region    Obesity, diabetes, and hypertension syndrome    Requires daily assistance for activities of daily living (ADL) and comfort needs    S/P left mastectomy    Prophylactic use of anastrozole (Arimidex)    Type 2 diabetes mellitus with stage 4 chronic kidney disease, with long-term current use of insulin    High priority for 2019 novel coronavirus vaccination    CKD (chronic kidney disease) stage 4, GFR 15-29 ml/min    Nephrotic range proteinuria    Left ureteral stone    Nephrolithiasis    Transaminitis    Chronic obstructive pyelonephritis    UTI (urinary tract infection)    Normocytic anemia    Acute kidney injury superimposed on CKD    Unspecified atrial fibrillation    Debility    Encounter for palliative care    Constipation    Hypertension associated with diabetes    Chronic kidney disease    Anemia of chronic disease    Urinary tract infection due to ESBL  "Klebsiella    Pressure injury of right buttock, stage 3    ESRD (end stage renal disease) on dialysis    Acute cystitis with hematuria    Uncomplicated opioid dependence    History of falling    Long term current use of insulin    Personal history of malignant neoplasm of breast    Aortic atherosclerosis    Diabetic polyneuropathy associated with type 2 diabetes mellitus    Bacteria in urine    Hyperphosphatemia    Irritant contact dermatitis due to fecal, urinary or dual incontinence    Volume overload    Elevated LFTs    Hypertension secondary to endocrine disorders    Fatty liver disease, nonalcoholic    Elevated alkaline phosphatase level    Positive JASON (antinuclear antibody)    Liver fibrosis    Ds DNA antibody positive       Current Outpatient Medications on File Prior to Visit   Medication Sig Dispense Refill    amLODIPine (NORVASC) 10 MG tablet Take 10 mg by mouth.      anastrozole (ARIMIDEX) 1 mg Tab TAKE 1 TABLET BY MOUTH EVERY DAY 90 tablet 2    apixaban (ELIQUIS) 5 mg Tab Take 1 tablet (5 mg total) by mouth Daily.      atorvastatin (LIPITOR) 80 MG tablet Take 1 tablet (80 mg total) by mouth once daily. 90 tablet 3    BD INSULIN SYRINGE ULTRA-FINE 0.5 mL 31 gauge x 5/16" Syrg USE WITH INSULIN 4 TIMES A  each 12    carvediloL (COREG) 6.25 MG tablet Take 1 tablet (6.25 mg total) by mouth 2 (two) times daily. 60 tablet 11    ceFAZolin 2 gram SolR       DIPRIVAN 10 mg/mL infusion       DULoxetine (CYMBALTA) 20 MG capsule Take 2 capsules (40 mg total) by mouth once daily. 180 capsule 1    erenumab-aooe (AIMOVIG AUTOINJECTOR) 140 mg/mL autoinjector 140 mg MONTHLY (route: subcutaneous) 1 each 11    ergocalciferol (ERGOCALCIFEROL) 50,000 unit Cap TAKE ONE CAPSULE BY MOUTH ONE TIME PER WEEK, TAKES ON TUESDAYS 12 capsule 3    fentaNYL (SUBLIMAZE) 50 mcg/mL injection       furosemide (LASIX) 40 MG tablet Take 1 tablet (40 mg total) by mouth once daily. 30 tablet 11    heparin sodium,porcine (HEPARIN, " "PORCINE,) 1,000 unit/mL injection       HYDROcodone-acetaminophen (NORCO)  mg per tablet Take 1 tablet by mouth every 6 (six) hours as needed for Pain. 120 tablet 0    insulin (LANTUS SOLOSTAR U-100 INSULIN) glargine 100 units/mL SubQ pen INJECT 14-15 UNITS UNDER THE SKIN ONCE DAILY (Patient taking differently: 15 Units every evening. INJECT 14-16 UNITS UNDER THE SKIN ONCE DAILY) 15 each 3    losartan (COZAAR) 100 MG tablet Take 1 tablet (100 mg total) by mouth once daily. 90 tablet 3    losartan (COZAAR) 50 MG tablet Take 50 mg by mouth.      methocarbamoL (ROBAXIN) 750 MG Tab TAKE 1-2 TABLETS (750-1,500 MG TOTAL) BY MOUTH 3 (THREE) TIMES DAILY AS NEEDED (MUSCLE SPASMS). 180 tablet 1    midazolam (VERSED) 1 mg/mL injection       ondansetron (ZOFRAN-ODT) 8 MG TbDL 8 mg EVERY 12 HOURS (route: oral)      oxybutynin (DITROPAN-XL) 10 MG 24 hr tablet TAKE 1 TABLET BY MOUTH EVERY DAY 90 tablet 4    oxyCODONE (OXY-IR) 5 mg Cap 1 tablet EVERY 6 HOURS (route: oral)      pen needle, diabetic (BD ULTRA-FINE SHORT PEN NEEDLE) 31 gauge x 5/16" Ndle USE WITH LANTUS DAILY, e 11.65 100 each 3    PHENYLephrine 10 mg/mL injection       POLOCAINE-MPF 15 mg/mL (1.5 %) injection       SYNTHROID 50 mcg tablet TAKE 1 TABLET BY MOUTH BEFORE BREAKFAST. ADMINISTER ON AN EMPTY STOMACH AT LEAST 30 MINUTES BEFORE FOOD. IF RECEIVING TUBE FEEDS, HOLD TUBE FEEDS FOR 1 HOUR BEFORE AND AFTER LEVOTHYROXINE ADMINISTRATION. 90 tablet 2    tenapanor 30 mg Tab Take 30 mg by mouth 2 (two) times a day. Take 1 tablet in the morning with meal and 1 tablet in the afternoon with meal      traZODone (DESYREL) 100 MG tablet TAKE 1 TABLET BY MOUTH NIGHTLY AS NEEDED FOR INSOMNIA. 90 tablet 2    VELPHORO 500 mg Chew CHEW INSERT TABLET 5 TIMES DAILY WITH MEAL AND SNACK      sumatriptan (IMITREX) 100 MG tablet Take 1 tablet (100 mg total) by mouth 2 (two) times daily as needed for Migraine (Do not take more than 2 in 24 hours or 3 in a week). 30 tablet 1 "     Current Facility-Administered Medications on File Prior to Visit   Medication Dose Route Frequency Provider Last Rate Last Admin    epoetin chloe injection 2,000 Units  2,000 Units Intravenous 1 time in Clinic/HOD Emmanuel Hare Jr., MD        heparin (porcine) injection 2,000 Units  2,000 Units Intravenous Once Emmanuel Hare Jr., MD        heparin (porcine) injection 2,000 Units  2,000 Units Intravenous Once Emmanuel Hare Jr., MD        heparin (porcine) injection 4,000 Units  4,000 Units Intra-Catheter 1 time in Clinic/HOD Emmanuel Hare Jr., MD        iron sucrose injection 100 mg  100 mg Intravenous 1 time in Clinic/HOD Emmanuel Hare Jr., MD           Review of patient's allergies indicates:  No Known Allergies    Past Surgical History:   Procedure Laterality Date    AV FISTULA PLACEMENT Left 2/14/2024    Procedure: CREATION, AV FISTULA;  Surgeon: RODRIGO Friedman III, MD;  Location: [facility] OR 2ND FLR;  Service: Vascular;  Laterality: Left;  LUE AVF creation vs AVG insertion    BIOPSY OF URETER Left 2/18/2022    Procedure: BIOPSY, URETER;  Surgeon: Ronel Whittington MD;  Location: [facility] OR 1ST FLR;  Service: Urology;  Laterality: Left;    BREAST BIOPSY Right     benign    CHOLECYSTECTOMY      COLONOSCOPY N/A 1/13/2017    Procedure: COLONOSCOPY;  Surgeon: Morris Wiseman MD;  Location: [facility] ENDO (4TH FLR);  Service: Endoscopy;  Laterality: N/A;  Renal pt Nephrology advised to avoid phosphate preps    COLONOSCOPY N/A 12/7/2023    Procedure: COLONOSCOPY;  Surgeon: Herve Allen MD;  Location: [facility] ENDO (2ND FLR);  Service: Endoscopy;  Laterality: N/A;  HD MWF; labs prior  suprapubic catheter  pt does not ambulate-uses christina lift- will have sling with her  9/7 ref. by Anastasiia Campbell, , PEG, instr. mailed, Eliquis hold approval pending-st  ok to hold Eliquis 2 days per Dr Navarro-GT  1/30-precall complete-MS    CYSTOSCOPY N/A 10/8/2018    Procedure: CYSTOSCOPY;   Surgeon: Ronel Whittington MD;  Location: [facility] OR 1ST FLR;  Service: Urology;  Laterality: N/A;  45 min    CYSTOSCOPY N/A 3/25/2019    Procedure: CYSTOSCOPY;  Surgeon: Ronel Whittington MD;  Location: [facility] OR 1ST FLR;  Service: Urology;  Laterality: N/A;  45 min    CYSTOSCOPY N/A 8/26/2019    Procedure: CYSTOSCOPY;  Surgeon: Ronel Whittington MD;  Location: [facility] OR [facility] FLR;  Service: Urology;  Laterality: N/A;  45 min    CYSTOSCOPY N/A 7/2/2021    Procedure: CYSTOSCOPY;  Surgeon: Ronel Whittington MD;  [facility];  Service: Urology;  Laterality: N/A;    CYSTOSCOPY  12/23/2021    Procedure: CYSTOSCOPY;  Surgeon: Ronel Whittington MD;  [facility];  Service: Urology;;    CYSTOSCOPY  2/18/2022    Procedure: CYSTOSCOPY;  Surgeon: Ronel Whittington MD;  [facility];  Service: Urology;;    CYSTOSCOPY W/ URETERAL STENT PLACEMENT Left 5/15/2021    Procedure: CYSTOSCOPY, WITH URETERAL STENT INSERTION;  Surgeon: Levy Sánchez Jr., MD;  [facility];  Service: Urology;  Laterality: Left;    CYSTOSCOPY WITH BIOPSY OF BLADDER N/A 1/27/2020    Procedure: CYSTOSCOPY, WITH BLADDER BIOPSY, WITH FULGURATION IF INDICATED;  Surgeon: Ronel Whittington MD;  Location: [facility] OR [address] [place] [address];  Service: Urology;  Laterality: N/A;    DILATION AND CURETTAGE OF UTERUS      FISTULOGRAM N/A 4/29/2024    Procedure: Fistulogram;  Surgeon: RODRIGO Friedman III, MD;  Location: [facility] CATH LAB;  Service: Peripheral Vascular;  Laterality: N/A;    GALLBLADDER SURGERY  2006    HYSTERECTOMY      INJECTION FOR SENTINEL NODE IDENTIFICATION Left 8/1/2019    Procedure: INJECTION, FOR SENTINEL NODE IDENTIFICATION;  Surgeon: Samia Fulton MD;  Location: [facility] OR 2ND FLR;  Service: General;  Laterality: Left;    INJECTION OF BOTULINUM TOXIN TYPE A N/A 10/8/2018    Procedure: INJECTION, BOTULINUM TOXIN, TYPE A 300 UNITS;  Surgeon: Ronel Whittington MD;  Location: [address]  FLR;  Service: Urology;  Laterality: N/A;    INJECTION OF BOTULINUM TOXIN TYPE A N/A 3/25/2019    Procedure: INJECTION, BOTULINUM TOXIN, TYPE A 300 UNITS;  Surgeon: Ronel Whittington MD;  [facility];  Service: Urology;  Laterality: N/A;    INJECTION OF BOTULINUM TOXIN TYPE A N/A 8/26/2019    Procedure: INJECTION, BOTULINUM TOXIN, TYPE A 300 UNITS;  Surgeon: Ronel Whittington MD;  [facility];  Service: Urology;  Laterality: N/A;    MASTECTOMY Left 8/1/2019    Procedure: MASTECTOMY 23 hour stay;  Surgeon: Samia Fulton MD;  Location: [facility] OR 2ND FLR;  Service: General;  Laterality: Left;    MEDIPORT REMOVAL Right 6/24/2022    Procedure: REMOVAL, CATHETER, CENTRAL VENOUS, TUNNELED, WITH PORT;  Surgeon: Isauro Anderson MD;  Location: [facility] CATH LAB;  Service: Radiology;  Laterality: Right;    OVARIAN CYST SURGERY  1985    PERCUTANEOUS TRANSLUMINAL ANGIOPLASTY OF ARTERIOVENOUS FISTULA Left 4/29/2024    Procedure: PTA, AV FISTULA;  Surgeon: RODRIGO Friedman III, MD;  Location: [facility] CATH LAB;  Service: Peripheral Vascular;  Laterality: Left;    REPLACEMENT OF STENT Left 12/23/2021    Procedure: REPLACEMENT, STENT;  Surgeon: Ronel Whittington MD;  [facility];  Service: Urology;  Laterality: Left;    REPLACEMENT OF STENT Left 2/18/2022    Procedure: REPLACEMENT, STENT;  Surgeon: Ronel Whittington MD;  [facility];  Service: Urology;  Laterality: Left;    RETROGRADE PYELOGRAPHY Left 2/18/2022    Procedure: PYELOGRAM, RETROGRADE;  Surgeon: Ronel Whittington MD;  [facility];  Service: Urology;  Laterality: Left;    SENTINEL LYMPH NODE BIOPSY Left 8/1/2019    Procedure: BIOPSY, LYMPH NODE, SENTINEL;  Surgeon: Samia Fulton MD;  Location: [facility] OR 2ND FLR;  Service: General;  Laterality: Left;    spt placement      TONSILLECTOMY, ADENOIDECTOMY      TOTAL ABDOMINAL HYSTERECTOMY W/ BILATERAL SALPINGOOPHORECTOMY  1985     URETEROSCOPY Left 2/18/2022    Procedure: URETEROSCOPY;  Surgeon: Ronel Whittington MD;  Location: [facility] OR [address] [place] [address];  Service: Urology;  Laterality: Left;       Family History   Problem Relation Name Age of Onset    Diabetes Sister Kat     Kidney disease Sister Kat         CKD III    ALS Mother          d.    Cancer Maternal Grandmother          d. colon    Cancer Paternal Grandfather          d. lung    Scoliosis Brother [place]         increased pain    Prostate cancer Brother [place]         cured s/p surgery    Cancer Brother [place]         rare cancer that got into the bones    Diabetes Maternal Aunt      Kidney disease Maternal Aunt      Diabetes Maternal Uncle      Amblyopia Neg Hx      Blindness Neg Hx      Cataracts Neg Hx      Glaucoma Neg Hx      Macular degeneration Neg Hx      Retinal detachment Neg Hx      Strabismus Neg Hx         Social History     Socioeconomic History    Marital status:     Number of children: 0    Highest education level: Master's degree (e.g., MA, MS, Lelo, MEd, MSW, BANDAR)   Occupational History    Occupation: teacher     Comment: retired   Tobacco Use    Smoking status: Never    Smokeless tobacco: Never   Substance and Sexual Activity    Alcohol use: No     Alcohol/week: 0.0 standard drinks of alcohol    Drug use: No    Sexual activity: Not Currently     Birth control/protection: Abstinence   Social History Narrative    Single ()             Brother passed away last year.  Pt lives her her sister. (5/27)     Social Determinants of Health     Financial Resource Strain: Low Risk  (2/21/2024)    Overall Financial Resource Strain (CARDIA)     Difficulty of Paying Living Expenses: Not hard at all   Recent Concern: Financial Resource Strain - High Risk (2/21/2024)    Overall Financial Resource Strain (CARDIA)     Difficulty of Paying Living Expenses: Hard   Food Insecurity: No Food Insecurity (2/21/2024)    Hunger Vital Sign     Worried About Running Out of Food in the  "Last Year: Never true     Ran Out of Food in the Last Year: Never true   Transportation Needs: No Transportation Needs (2/21/2024)    PRAPARE - Transportation     Lack of Transportation (Medical): No     Lack of Transportation (Non-Medical): No   Recent Concern: Transportation Needs - Unmet Transportation Needs (12/28/2023)    PRAPARE - Transportation     Lack of Transportation (Medical): Yes     Lack of Transportation (Non-Medical): Yes   Physical Activity: Inactive (2/21/2024)    Exercise Vital Sign     Days of Exercise per Week: 0 days     Minutes of Exercise per Session: 0 min   Stress: No Stress Concern Present (2/21/2024)    [employer] - Occupational Stress Questionnaire     Feeling of Stress : Not at all   Recent Concern: Stress - Stress Concern Present (2/21/2024)    [employer] - Occupational Stress Questionnaire     Feeling of Stress : Rather much   Housing Stability: Low Risk  (2/21/2024)    Housing Stability Vital Sign     Unable to Pay for Housing in the Last Year: No     Number of Places Lived in the Last Year: 1     Unstable Housing in the Last Year: No       Review of Systems   Constitutional: Negative for chills and fever.   Cardiovascular:  Positive for leg swelling. Negative for claudication.   Respiratory:  Negative for cough and shortness of breath.    Skin:  Positive for dry skin and nail changes. Negative for itching and rash.   Musculoskeletal:  Positive for arthritis, back pain, joint pain, myalgias and stiffness. Negative for falls, joint swelling and muscle weakness.   Gastrointestinal:  Negative for diarrhea, nausea and vomiting.   Neurological:  Positive for paresthesias. Negative for numbness, tremors and weakness.   Psychiatric/Behavioral:  Negative for altered mental status and hallucinations.            Objective:      Vitals:    05/09/24 1434   BP: 135/72   Pulse: 86   Height: 5' 8" (1.727 m)   PainSc: 0-No pain "       Physical Exam  Vitals and nursing note reviewed.   Constitutional:       General: She is not in acute distress.     Appearance: She is well-developed. She is not toxic-appearing or diaphoretic.      Comments: alert and oriented x 3.    Cardiovascular:      Pulses:           Dorsalis pedis pulses are 1+ on the right side and 1+ on the left side.      Comments: Posterior tibial pulses are diminished Bilaterally. Toes are cool to touch. Feet are warm proximally.There is decreased digital hair. Skin is atrophic, slightly hyperpigmented  Pulmonary:      Effort: No respiratory distress.   Musculoskeletal:         General: No deformity.      Right lower le+ Edema present.      Left lower le+ Edema present.      Right ankle: No tenderness. No lateral malleolus, medial malleolus, AITF ligament, CF ligament or posterior TF ligament tenderness.      Right Achilles Tendon: No defects. Jamil's test negative.      Left ankle: No tenderness. No lateral malleolus, medial malleolus, AITF ligament, CF ligament or posterior TF ligament tenderness.      Left Achilles Tendon: No defects. Jamil's test negative.      Right foot: No tenderness or bony tenderness.      Left foot: No tenderness or bony tenderness.      Comments: Adequate joint range of motion without pain, limitation, nor crepitation Bilateral feet and ankle joints. Muscle strength is 5/5 in all groups bilaterally.           Feet:      Right foot:      Protective Sensation: 5 sites tested.  5 sites sensed.      Skin integrity: Skin integrity normal.      Left foot:      Protective Sensation: 5 sites tested.  5 sites sensed.      Skin integrity: Skin integrity normal.   Lymphadenopathy:      Comments: No lymphatic streaking     Skin:     General: Skin is warm and dry.      Coloration: Skin is not pale.      Findings: No rash.      Nails: There is no clubbing.      Comments: Toenails 1-5 bilaterally are elongated by 2-3 mm, thickened by 2-3 mm,  discolored/yellowed, dystrophic, brittle with subungual debris. Diffuse xerosis noted to plantar aspects of b/l foot/heels.      Neurological:      Sensory: Sensory deficit present.      Motor: No atrophy.      Comments: Light touch, proprioception, and sharp/dull sensation are all intact bilaterally. Protective threshold with the Hingham-Wienstein monofilament is intact bilaterally. Subjective paresthesias with no clearly identifiable source or trigger.      Psychiatric:         Attention and Perception: She is attentive.         Mood and Affect: Mood is not anxious. Affect is not inappropriate.         Speech: She is communicative. Speech is not slurred.         Behavior: Behavior is not combative.               Assessment:       Encounter Diagnoses   Name Primary?    Type II diabetes mellitus with neurological manifestations Yes    PVD (peripheral vascular disease)     Bilateral lower extremity edema     Onychodystrophy            Plan:     I counseled the patient on her conditions, their implications and medical management.     - Shoe inspection. Diabetic Foot Education. Patient reminded of the importance of good nutrition and blood sugar control to help prevent podiatric complications of diabetes. Patient instructed on proper foot hygeine. We discussed wearing proper shoe gear, daily foot inspections, never walking without protective shoe gear, caution putting sharp instruments to feet     - Discussed DM foot care:  Wear comfortable, proper fitting shoes. Wash feet daily. Dry well. After drying, apply moisturizer to feet (no lotion to webspaces). Inspect feet daily for skin breaks, blisters, swelling, or redness. Wear cotton socks (preferably white)  Change socks every day. Do NOT walk barefoot. Do NOT use heating pads or warm/hot water soaks     With patient's permission, nails were aggressively reduced and debrided 1-5 b/l, removing all offending nail and debris. Patient tolerated this well and no blood was  drawn. Patient reports relief following the procedure.     Long discussion with patient regarding appropriate, supportive and comfortable shoes. Recommended supportive style shoe brands with adequate arch supports to alleviate abnormal pressure and improve stability of foot while walking. Avoid flat shoes and barefoot walking as these will exacerbate or worsen symptoms.     RTC 3-4 months, sooner PRN

## 2024-05-10 ENCOUNTER — TELEPHONE (OUTPATIENT)
Dept: VASCULAR SURGERY | Facility: CLINIC | Age: 74
End: 2024-05-10
Payer: MEDICARE

## 2024-05-10 ENCOUNTER — EXTERNAL HOME HEALTH (OUTPATIENT)
Dept: HOME HEALTH SERVICES | Facility: HOSPITAL | Age: 74
End: 2024-05-10
Payer: MEDICARE

## 2024-05-10 ENCOUNTER — CARE AT HOME (OUTPATIENT)
Dept: HOME HEALTH SERVICES | Facility: CLINIC | Age: 74
End: 2024-05-10
Payer: MEDICARE

## 2024-05-10 DIAGNOSIS — Z99.2 ESRD (END STAGE RENAL DISEASE) ON DIALYSIS: Primary | ICD-10-CM

## 2024-05-10 DIAGNOSIS — N31.9 NEUROGENIC BLADDER: Chronic | ICD-10-CM

## 2024-05-10 DIAGNOSIS — N18.6 ESRD (END STAGE RENAL DISEASE) ON DIALYSIS: Primary | ICD-10-CM

## 2024-05-10 DIAGNOSIS — R53.81 DEBILITY: Chronic | ICD-10-CM

## 2024-05-10 DIAGNOSIS — Z51.5 ENCOUNTER FOR PALLIATIVE CARE: ICD-10-CM

## 2024-05-10 PROCEDURE — 3044F HG A1C LEVEL LT 7.0%: CPT | Mod: CPTII,95,, | Performed by: NURSE PRACTITIONER

## 2024-05-10 PROCEDURE — 4010F ACE/ARB THERAPY RXD/TAKEN: CPT | Mod: CPTII,95,, | Performed by: NURSE PRACTITIONER

## 2024-05-10 PROCEDURE — 3066F NEPHROPATHY DOC TX: CPT | Mod: CPTII,95,, | Performed by: NURSE PRACTITIONER

## 2024-05-10 PROCEDURE — 99442 PR PHYSICIAN TELEPHONE EVALUATION 11-20 MIN: CPT | Mod: 95,,, | Performed by: NURSE PRACTITIONER

## 2024-05-10 NOTE — TELEPHONE ENCOUNTER
Contacted pt to schedule appts. Appointment scheduled, pt verified. Appointment letter placed in mail.   ----- Message from RODRIGO Friedman III, MD sent at 4/29/2024 12:19 PM CDT -----  F/u 2 wks with quantitative AVF duplex

## 2024-05-10 NOTE — PROGRESS NOTES
Established Patient - Audio Only Telehealth Visit     The patient location is: home  The chief complaint leading to consultation is: palliative care discussion  Visit type: Virtual visit with audio only (telephone)  Total time spent with patient: 15       The reason for the audio only service rather than synchronous audio and video virtual visit was related to technical difficulties or patient preference/necessity.     Each patient to whom I provide medical services by telemedicine is:  (1) informed of the relationship between the physician and patient and the respective role of any other health care provider with respect to management of the patient; and (2) notified that they may decline to receive medical services by telemedicine and may withdraw from such care at any time. Patient verbally consented to receive this service via voice-only telephone call.       HPI:   Cecile Bowen completed an audio visit today to discuss palliative care. She has ESRD on dialysis. She is very limited with ADLs. We discussed her current situation. She confirms that she would like a referral to continue palliative care visits at this time.        Assessment and plan:      Encounter for palliative care  ESRD on dialysis  Neurogenic Bladder  Debility    --referral placed to [facility] for palliative care in the home     --patient to follow up with PCP and specialists as scheduled                   This service was not originating from a related E/M service provided within the previous 7 days nor will  to an E/M service or procedure within the next 24 hours or my soonest available appointment.  Prevailing standard of care was able to be met in this audio-only visit.

## 2024-05-13 ENCOUNTER — OUTPATIENT CASE MANAGEMENT (OUTPATIENT)
Dept: ADMINISTRATIVE | Facility: OTHER | Age: 74
End: 2024-05-13
Payer: MEDICARE

## 2024-05-14 ENCOUNTER — HOSPITAL ENCOUNTER (OUTPATIENT)
Dept: RADIOLOGY | Facility: CLINIC | Age: 74
Discharge: HOME | End: 2024-05-14
Attending: STUDENT IN AN ORGANIZED HEALTH CARE EDUCATION/TRAINING PROGRAM
Payer: MEDICARE

## 2024-05-14 DIAGNOSIS — Z78.0 POSTMENOPAUSAL ESTROGEN DEFICIENCY: ICD-10-CM

## 2024-05-16 ENCOUNTER — TELEPHONE (OUTPATIENT)
Dept: NEUROLOGY | Facility: CLINIC | Age: 74
End: 2024-05-16
Payer: MEDICARE

## 2024-05-16 NOTE — TELEPHONE ENCOUNTER
----- Message from Anusha Houser sent at 5/16/2024  2:16 PM CDT -----  Regarding: refill  Contact: @  [phone]  Pt is calling to get a refill on the following HYDROcodone-acetaminophen (NORCO)  mg per tablet, methocarbamoL (ROBAXIN) 750 MG Tab, and oxyCODONE (OXY-IR) 5 mg Cap...Please call and adv @  [phone]    [id number] - [place] - [address] AT [facility] [address] [place] [address]  [address] [place] [address]  [place] [address]  Phone: [phone] Fax: [phone]

## 2024-05-16 NOTE — PROGRESS NOTES
Outpatient Care Management  Plan of Care Follow Up Visit    Patient: Cecile Bowen  MRN: [id number]  Date of Service: 05/13/2024  Completed by: Danielle Woods RN  Referral Date: 02/08/2024    Reason for Visit   Patient presents with    OPCM RN Follow Up Call    OPCM Chart Review       Brief Summary:   OPCM RN follow-up call completed.   Continue education on Wound (Pressure Injury Prevention).  Next Steps: Patient is in agreement to follow-up call on or around 5/27/2024 with plans to close case.

## 2024-05-23 ENCOUNTER — PATIENT MESSAGE (OUTPATIENT)
Dept: PHYSICAL MEDICINE AND REHAB | Facility: CLINIC | Age: 74
End: 2024-05-23
Payer: MEDICARE

## 2024-05-23 DIAGNOSIS — G89.29 CHRONIC NECK PAIN: ICD-10-CM

## 2024-05-23 DIAGNOSIS — M54.50 CHRONIC MIDLINE LOW BACK PAIN WITHOUT SCIATICA: ICD-10-CM

## 2024-05-23 DIAGNOSIS — M54.2 CHRONIC NECK PAIN: ICD-10-CM

## 2024-05-23 DIAGNOSIS — G89.29 CHRONIC MIDLINE LOW BACK PAIN WITHOUT SCIATICA: ICD-10-CM

## 2024-05-23 DIAGNOSIS — M79.7 FIBROMYALGIA: ICD-10-CM

## 2024-05-24 RX ORDER — METHOCARBAMOL 750 MG/1
TABLET, FILM COATED ORAL
Qty: 180 TABLET | Refills: 1 | Status: SHIPPED | OUTPATIENT
Start: 2024-05-24

## 2024-05-24 RX ORDER — OXYCODONE HYDROCHLORIDE 5 MG/1
CAPSULE ORAL
OUTPATIENT
Start: 2024-05-24

## 2024-05-24 RX ORDER — HYDROCODONE BITARTRATE AND ACETAMINOPHEN 10; 325 MG/1; MG/1
1 TABLET ORAL EVERY 6 HOURS PRN
Qty: 120 TABLET | Refills: 0 | Status: SHIPPED | OUTPATIENT
Start: 2024-05-24 | End: 2024-06-23

## 2024-05-24 NOTE — TELEPHONE ENCOUNTER
Last ordered:  Norco & Robaxin 04/15/24  Oxycodone 04/28/24    Last seen:11/14/23  Upcoming appt:06/25/24

## 2024-05-27 ENCOUNTER — OUTPATIENT CASE MANAGEMENT (OUTPATIENT)
Dept: ADMINISTRATIVE | Facility: OTHER | Age: 74
End: 2024-05-27
Payer: MEDICARE

## 2024-05-28 ENCOUNTER — TELEPHONE (OUTPATIENT)
Dept: INTERNAL MEDICINE | Facility: CLINIC | Age: 74
End: 2024-05-28
Payer: MEDICARE

## 2024-05-28 DIAGNOSIS — E11.22 TYPE 2 DIABETES MELLITUS WITH STAGE 4 CHRONIC KIDNEY DISEASE, WITH LONG-TERM CURRENT USE OF INSULIN: Primary | ICD-10-CM

## 2024-05-28 DIAGNOSIS — N18.4 TYPE 2 DIABETES MELLITUS WITH STAGE 4 CHRONIC KIDNEY DISEASE, WITH LONG-TERM CURRENT USE OF INSULIN: Primary | ICD-10-CM

## 2024-05-28 DIAGNOSIS — Z79.4 TYPE 2 DIABETES MELLITUS WITH STAGE 4 CHRONIC KIDNEY DISEASE, WITH LONG-TERM CURRENT USE OF INSULIN: Primary | ICD-10-CM

## 2024-05-28 NOTE — TELEPHONE ENCOUNTER
Spoke to pt. Pt stated she needs refill on her Xiomara. Pt has not been seen since 1/23. Set up audio appt on 6/5/24. Pt stated she has [facility] and requested her labs to be drawn by them.

## 2024-05-28 NOTE — TELEPHONE ENCOUNTER
----- Message from Brii Burt sent at 5/28/2024 10:29 AM CDT -----  Regarding: Advise  Contact: [phone]  Patient is calling requesting to speak to provider in regards to medication per pt. Pt didn't know the name off hand. Please give pt a call to further discuss.

## 2024-05-29 ENCOUNTER — PATIENT MESSAGE (OUTPATIENT)
Dept: PHYSICAL MEDICINE AND REHAB | Facility: CLINIC | Age: 74
End: 2024-05-29
Payer: MEDICARE

## 2024-05-29 RX ORDER — OXYCODONE HYDROCHLORIDE 5 MG/1
5 CAPSULE ORAL DAILY PRN
Qty: 15 CAPSULE | Refills: 0 | Status: SHIPPED | OUTPATIENT
Start: 2024-05-29 | End: 2024-05-30

## 2024-05-30 ENCOUNTER — OUTPATIENT CASE MANAGEMENT (OUTPATIENT)
Dept: ADMINISTRATIVE | Facility: OTHER | Age: 74
End: 2024-05-30
Payer: MEDICARE

## 2024-05-30 RX ORDER — OXYCODONE HYDROCHLORIDE 5 MG/1
5 TABLET ORAL DAILY PRN
Qty: 30 TABLET | Refills: 0 | Status: SHIPPED | OUTPATIENT
Start: 2024-05-30 | End: 2024-06-29

## 2024-05-31 NOTE — PROGRESS NOTES
Outpatient Care Management  Plan of Care Follow Up Visit    Patient: Cecile Bowen  MRN: [id number]  Date of Service: 05/30/2024  Completed by: Danielle Woods RN  Referral Date: 02/08/2024    Reason for Visit   Patient presents with    OPCM RN Follow Up Call    Case Closure       Brief Summary:   OPCM RN follow-up call completed.   Completed Care Plan- Wounds.   Case closed.

## 2024-06-05 ENCOUNTER — OFFICE VISIT (OUTPATIENT)
Dept: INTERNAL MEDICINE | Facility: CLINIC | Age: 74
End: 2024-06-05
Payer: MEDICARE

## 2024-06-05 DIAGNOSIS — N11.1 CHRONIC OBSTRUCTIVE PYELONEPHRITIS: ICD-10-CM

## 2024-06-05 DIAGNOSIS — E11.42 DIABETIC POLYNEUROPATHY ASSOCIATED WITH TYPE 2 DIABETES MELLITUS: ICD-10-CM

## 2024-06-05 DIAGNOSIS — Z99.2 ESRD (END STAGE RENAL DISEASE) ON DIALYSIS: ICD-10-CM

## 2024-06-05 DIAGNOSIS — G47.33 OSA (OBSTRUCTIVE SLEEP APNEA): Chronic | ICD-10-CM

## 2024-06-05 DIAGNOSIS — E66.09 CLASS 1 OBESITY DUE TO EXCESS CALORIES WITH SERIOUS COMORBIDITY AND BODY MASS INDEX (BMI) OF 33.0 TO 33.9 IN ADULT: ICD-10-CM

## 2024-06-05 DIAGNOSIS — N18.6 ESRD (END STAGE RENAL DISEASE) ON DIALYSIS: ICD-10-CM

## 2024-06-05 DIAGNOSIS — N31.9 NEUROGENIC BLADDER: Chronic | ICD-10-CM

## 2024-06-05 DIAGNOSIS — E66.9 DIABETES MELLITUS TYPE 2 IN OBESE: ICD-10-CM

## 2024-06-05 DIAGNOSIS — N18.4 CKD (CHRONIC KIDNEY DISEASE) STAGE 4, GFR 15-29 ML/MIN: Primary | Chronic | ICD-10-CM

## 2024-06-05 DIAGNOSIS — K74.00 LIVER FIBROSIS: ICD-10-CM

## 2024-06-05 DIAGNOSIS — M79.7 FIBROMYALGIA: Chronic | ICD-10-CM

## 2024-06-05 DIAGNOSIS — Z91.81 HISTORY OF FALLING: ICD-10-CM

## 2024-06-05 DIAGNOSIS — E03.9 ACQUIRED HYPOTHYROIDISM: Chronic | ICD-10-CM

## 2024-06-05 DIAGNOSIS — E11.69 DIABETES MELLITUS TYPE 2 IN OBESE: ICD-10-CM

## 2024-06-05 DIAGNOSIS — F33.1 MAJOR DEPRESSIVE DISORDER, RECURRENT, MODERATE: ICD-10-CM

## 2024-06-05 DIAGNOSIS — K76.0 FATTY LIVER DISEASE, NONALCOHOLIC: ICD-10-CM

## 2024-06-05 PROCEDURE — 1160F RVW MEDS BY RX/DR IN RCRD: CPT | Mod: HCNC,CPTII,95, | Performed by: NURSE PRACTITIONER

## 2024-06-05 PROCEDURE — 3044F HG A1C LEVEL LT 7.0%: CPT | Mod: HCNC,CPTII,95, | Performed by: NURSE PRACTITIONER

## 2024-06-05 PROCEDURE — 4010F ACE/ARB THERAPY RXD/TAKEN: CPT | Mod: HCNC,CPTII,95, | Performed by: NURSE PRACTITIONER

## 2024-06-05 PROCEDURE — 3066F NEPHROPATHY DOC TX: CPT | Mod: HCNC,CPTII,95, | Performed by: NURSE PRACTITIONER

## 2024-06-05 PROCEDURE — 1159F MED LIST DOCD IN RCRD: CPT | Mod: HCNC,CPTII,95, | Performed by: NURSE PRACTITIONER

## 2024-06-05 PROCEDURE — 99442 PR PHYSICIAN TELEPHONE EVALUATION 11-20 MIN: CPT | Mod: HCNC,95,, | Performed by: NURSE PRACTITIONER

## 2024-06-05 NOTE — Clinical Note
Last A1c looks great  Needs less insulin F/u in 5 months -Samia RUBIN labs are in A1c vit d tsh ithis week, A1c next time  Upgrading to nadya 3/reader- [facility]

## 2024-06-06 ENCOUNTER — LAB VISIT (OUTPATIENT)
Dept: LAB | Facility: HOSPITAL | Age: 74
End: 2024-06-06
Attending: STUDENT IN AN ORGANIZED HEALTH CARE EDUCATION/TRAINING PROGRAM
Payer: MEDICARE

## 2024-06-06 ENCOUNTER — OFFICE VISIT (OUTPATIENT)
Dept: RHEUMATOLOGY | Facility: CLINIC | Age: 74
End: 2024-06-06
Payer: MEDICARE

## 2024-06-06 DIAGNOSIS — R76.8 DS DNA ANTIBODY POSITIVE: ICD-10-CM

## 2024-06-06 DIAGNOSIS — R76.8 POSITIVE ANA (ANTINUCLEAR ANTIBODY): ICD-10-CM

## 2024-06-06 DIAGNOSIS — R76.8 POSITIVE ANA (ANTINUCLEAR ANTIBODY): Primary | ICD-10-CM

## 2024-06-06 LAB
C3 SERPL-MCNC: 75 MG/DL (ref 50–180)
C4 SERPL-MCNC: 12 MG/DL (ref 11–44)
CCP AB SER IA-ACNC: 1.4 U/ML
CCP AB SER IA-ACNC: 1.4 U/ML
CRP SERPL-MCNC: 13.5 MG/L (ref 0–8.2)
ERYTHROCYTE [SEDIMENTATION RATE] IN BLOOD BY PHOTOMETRIC METHOD: 44 MM/HR (ref 0–36)
RHEUMATOID FACT SERPL-ACNC: <13 IU/ML (ref 0–15)

## 2024-06-06 PROCEDURE — 86140 C-REACTIVE PROTEIN: CPT | Mod: HCNC | Performed by: STUDENT IN AN ORGANIZED HEALTH CARE EDUCATION/TRAINING PROGRAM

## 2024-06-06 PROCEDURE — 86200 CCP ANTIBODY: CPT | Mod: HCNC | Performed by: STUDENT IN AN ORGANIZED HEALTH CARE EDUCATION/TRAINING PROGRAM

## 2024-06-06 PROCEDURE — 36415 COLL VENOUS BLD VENIPUNCTURE: CPT | Mod: HCNC | Performed by: STUDENT IN AN ORGANIZED HEALTH CARE EDUCATION/TRAINING PROGRAM

## 2024-06-06 PROCEDURE — 85652 RBC SED RATE AUTOMATED: CPT | Mod: HCNC | Performed by: STUDENT IN AN ORGANIZED HEALTH CARE EDUCATION/TRAINING PROGRAM

## 2024-06-06 PROCEDURE — 86235 NUCLEAR ANTIGEN ANTIBODY: CPT | Mod: HCNC | Performed by: STUDENT IN AN ORGANIZED HEALTH CARE EDUCATION/TRAINING PROGRAM

## 2024-06-06 PROCEDURE — 99499 UNLISTED E&M SERVICE: CPT | Mod: HCNC,S$GLB,, | Performed by: STUDENT IN AN ORGANIZED HEALTH CARE EDUCATION/TRAINING PROGRAM

## 2024-06-06 PROCEDURE — 86431 RHEUMATOID FACTOR QUANT: CPT | Mod: HCNC | Performed by: STUDENT IN AN ORGANIZED HEALTH CARE EDUCATION/TRAINING PROGRAM

## 2024-06-06 PROCEDURE — 86160 COMPLEMENT ANTIGEN: CPT | Mod: HCNC | Performed by: STUDENT IN AN ORGANIZED HEALTH CARE EDUCATION/TRAINING PROGRAM

## 2024-06-06 PROCEDURE — 86235 NUCLEAR ANTIGEN ANTIBODY: CPT | Mod: 91,HCNC | Performed by: STUDENT IN AN ORGANIZED HEALTH CARE EDUCATION/TRAINING PROGRAM

## 2024-06-06 PROCEDURE — 86160 COMPLEMENT ANTIGEN: CPT | Mod: 59,HCNC | Performed by: STUDENT IN AN ORGANIZED HEALTH CARE EDUCATION/TRAINING PROGRAM

## 2024-06-06 PROCEDURE — 86225 DNA ANTIBODY NATIVE: CPT | Mod: HCNC | Performed by: STUDENT IN AN ORGANIZED HEALTH CARE EDUCATION/TRAINING PROGRAM

## 2024-06-06 PROCEDURE — 99999 PR PBB SHADOW E&M-EST. PATIENT-LVL I: CPT | Mod: PBBFAC,HCNC,, | Performed by: STUDENT IN AN ORGANIZED HEALTH CARE EDUCATION/TRAINING PROGRAM

## 2024-06-06 NOTE — PROGRESS NOTES
Patient had no lab work done to review during appointment- she was taken to do blood work and we will reach out when it results to reschedule appointment.   Sharath Rogers Answers submitted by the patient for this visit:  Rheumatology Questionnaire (Submitted on 6/2/2024)  fever: No  eye redness: No  mouth sores: No  headaches: No  shortness of breath: No  chest pain: No  trouble swallowing: No  diarrhea: Yes  constipation: Yes  unexpected weight change: No  genital sore: No  dysuria: No  During the last 3 days, have you had a skin rash?: No  Bruises or bleeds easily: Yes  cough: No

## 2024-06-07 LAB — DSDNA AB SER-ACNC: NORMAL [IU]/ML

## 2024-06-08 LAB — ENA JO1 IGG SER-ACNC: <0.2 U

## 2024-06-09 RX ORDER — INSULIN GLARGINE 100 [IU]/ML
INJECTION, SOLUTION SUBCUTANEOUS
Qty: 15 ML | Refills: 3
Start: 2024-06-09

## 2024-06-09 NOTE — PROGRESS NOTES
Established Patient - Audio Only Telehealth Visit     The patient location is: home   The chief complaint leading to consultation is: type 2 d m  Visit type: Virtual visit with audio only (telephone)  Total time spent with patient: 15, 20 mins    Doximity used   [phone]  Lab Results   Component Value Date    HGBA1C 6.2 (H) 04/22/2024     Bg 50s  Highest 170s, 180s  Lantus 15 units qhs  HD mon and fri only,skips wed   Dialysis -lasts 4 hours , still making urine   At times giving lantus q other day     The reason for the audio only service rather than synchronous audio and video virtual visit was related to technical difficulties or patient preference/necessity.     Each patient to whom I provide medical services by telemedicine is:  (1) informed of the relationship between the physician and patient and the respective role of any other health care provider with respect to management of the patient; and (2) notified that they may decline to receive medical services by telemedicine and may withdraw from such care at any time. Patient verbally consented to receive this service via voice-only telephone call.       HPI: type 2 dm      Assessment and plan:    1. CKD (chronic kidney disease) stage 4, GFR 15-29 ml/min        2. Chronic obstructive pyelonephritis        3. Class 1 obesity due to excess calories with serious comorbidity and body mass index (BMI) of 33.0 to 33.9 in adult        4. Diabetic polyneuropathy associated with type 2 diabetes mellitus  Hemoglobin A1C    Hemoglobin A1C      5. ESRD (end stage renal disease) on dialysis  Vitamin D      6. Fatty liver disease, nonalcoholic        7. Fibromyalgia        8. History of falling        9. Acquired hypothyroidism  TSH      10. Liver fibrosis        11. Major depressive disorder, recurrent, moderate        12. VIJAYA (obstructive sleep apnea)        13. Neurogenic bladder          1-13. Follow up in 5 months   Send chart notes for nadya 3 and reader- [facility]   F/u with urology, nephrology, dialysis   A1c goal less than 7.5%  Lab Results   Component Value Date    TSH 0.571 08/06/2023     Placed labs for HHa1c vit d tsh this week  A1c in 5 months   Cut back lantus to 12 units qhs                        This service was not originating from a related E/M service provided within the previous 7 days nor will  to an E/M service or procedure within the next 24 hours or my soonest available appointment.  Prevailing standard of care was able to be met in this audio-only visit.

## 2024-06-10 ENCOUNTER — PATIENT MESSAGE (OUTPATIENT)
Dept: INTERNAL MEDICINE | Facility: CLINIC | Age: 74
End: 2024-06-10
Payer: MEDICARE

## 2024-06-10 RX ORDER — ERGOCALCIFEROL 1.25 MG/1
50000 CAPSULE ORAL
Qty: 12 CAPSULE | Refills: 3 | Status: SHIPPED | OUTPATIENT
Start: 2024-06-10

## 2024-06-10 RX ORDER — ERGOCALCIFEROL 1.25 MG/1
CAPSULE ORAL
Qty: 12 CAPSULE | Refills: 3 | Status: SHIPPED | OUTPATIENT
Start: 2024-06-10 | End: 2024-06-10 | Stop reason: SDUPTHER

## 2024-06-11 ENCOUNTER — HOSPITAL ENCOUNTER (OUTPATIENT)
Dept: VASCULAR SURGERY | Facility: CLINIC | Age: 74
Discharge: HOME | End: 2024-06-11
Attending: SURGERY
Payer: MEDICARE

## 2024-06-11 ENCOUNTER — OFFICE VISIT (OUTPATIENT)
Dept: VASCULAR SURGERY | Facility: CLINIC | Age: 74
End: 2024-06-11
Attending: SURGERY
Payer: MEDICARE

## 2024-06-11 VITALS — SYSTOLIC BLOOD PRESSURE: 170 MMHG | HEART RATE: 78 BPM | TEMPERATURE: 98 F | DIASTOLIC BLOOD PRESSURE: 73 MMHG

## 2024-06-11 DIAGNOSIS — Z99.2 ESRD (END STAGE RENAL DISEASE) ON DIALYSIS: Primary | ICD-10-CM

## 2024-06-11 DIAGNOSIS — N18.6 ESRD (END STAGE RENAL DISEASE) ON DIALYSIS: Primary | ICD-10-CM

## 2024-06-11 DIAGNOSIS — N18.6 ESRD (END STAGE RENAL DISEASE) ON DIALYSIS: ICD-10-CM

## 2024-06-11 DIAGNOSIS — Z99.2 ESRD (END STAGE RENAL DISEASE) ON DIALYSIS: ICD-10-CM

## 2024-06-11 LAB — ENA SCL70 AB SER-ACNC: 1.1 U/ML

## 2024-06-11 PROCEDURE — 3288F FALL RISK ASSESSMENT DOCD: CPT | Mod: HCNC,CPTII,S$GLB, | Performed by: SURGERY

## 2024-06-11 PROCEDURE — 1101F PT FALLS ASSESS-DOCD LE1/YR: CPT | Mod: HCNC,CPTII,S$GLB, | Performed by: SURGERY

## 2024-06-11 PROCEDURE — 3078F DIAST BP <80 MM HG: CPT | Mod: HCNC,CPTII,S$GLB, | Performed by: SURGERY

## 2024-06-11 PROCEDURE — 4010F ACE/ARB THERAPY RXD/TAKEN: CPT | Mod: HCNC,CPTII,S$GLB, | Performed by: SURGERY

## 2024-06-11 PROCEDURE — 1160F RVW MEDS BY RX/DR IN RCRD: CPT | Mod: HCNC,CPTII,S$GLB, | Performed by: SURGERY

## 2024-06-11 PROCEDURE — 3044F HG A1C LEVEL LT 7.0%: CPT | Mod: HCNC,CPTII,S$GLB, | Performed by: SURGERY

## 2024-06-11 PROCEDURE — 3077F SYST BP >= 140 MM HG: CPT | Mod: HCNC,CPTII,S$GLB, | Performed by: SURGERY

## 2024-06-11 PROCEDURE — 93990 DOPPLER FLOW TESTING: CPT | Mod: HCNC,S$GLB,, | Performed by: SURGERY

## 2024-06-11 PROCEDURE — 1125F AMNT PAIN NOTED PAIN PRSNT: CPT | Mod: HCNC,CPTII,S$GLB, | Performed by: SURGERY

## 2024-06-11 PROCEDURE — 1159F MED LIST DOCD IN RCRD: CPT | Mod: HCNC,CPTII,S$GLB, | Performed by: SURGERY

## 2024-06-11 PROCEDURE — 99999 PR PBB SHADOW E&M-EST. PATIENT-LVL IV: CPT | Mod: PBBFAC,HCNC,, | Performed by: SURGERY

## 2024-06-11 PROCEDURE — 3066F NEPHROPATHY DOC TX: CPT | Mod: HCNC,CPTII,S$GLB, | Performed by: SURGERY

## 2024-06-11 PROCEDURE — 99214 OFFICE O/P EST MOD 30 MIN: CPT | Mod: HCNC,S$GLB,, | Performed by: SURGERY

## 2024-06-11 RX ORDER — CALCIUM ACETATE 667 MG/1
667 CAPSULE ORAL
COMMUNITY

## 2024-06-11 NOTE — PROGRESS NOTES
VASCULAR SURGERY SERVICE    REFERRING DOCTOR: Lavinia Nieto NP     CHIEF COMPLAINT:  End-stage renal disease    HISTORY OF PRESENT ILLNESS: Cecile Bowen is a 73 y.o. female with prior simple mastectomy on the left, sentinel node biopsy, no axillary node dissection, with end-stage renal disease times 3 months via right IJ PermCath who is sent for permanent dialysis access.  She is currently dialyzing only Mondays and Fridays.    She is right-handed.  She has no history of AICD or pacemaker placement.  She does have known bilateral carpal tunnel syndrome with the occasional numbness in her hands but has not had a release.    She takes Eliquis 5 mg but only once a day because of easy bruising.  She is on clear why she is on Eliquis    Finally she takes chronic narcotics for back pain    S/p  1/ trans radial PTA, left AV fistula 04/29/2024  2. left brachiocephalic AV fistula creation 02/14/2024    3/5/2024:  This is initial follow-up after left brachiocephalic AV fistula creation 02/14/2024.  She is without complaint specifically no hand pain weakness or numbness    04/09/2024:  She now returns.  She is dialyzing on Mondays and Fridays only.  She denies any hand pain weakness or numbness    06/11/2024:  She now returns 6 weeks status post angioplasty left AV fistula.  She is still dialyzing only Mondays and Fridays via PermCath    Past Medical History:   Diagnosis Date    Cervicogenic migraine     Chronic pain     CKD (chronic kidney disease) stage 4, GFR 15-29 ml/min     [facility]    CKD (chronic kidney disease) stage 4, GFR 15-29 ml/min     Diabetes mellitus     Long term use of Insulin, Diabetic Neuropathy    Dialysis patient 1/21/2022    Fibromyalgia     Hydronephrosis     Hyperlipidemia     Hypertension 12/12/2012    Hypothyroidism 12/12/2012    ARON (iron deficiency anemia)     Insomnia     Levoscoliosis     Malignant neoplasm of upper-outer quadrant of left breast in female, estrogen receptor positive      Metabolic acidosis     Mobility impaired     Nuclear sclerosis - Both Eyes 3/24/2014    VIJAYA (obstructive sleep apnea)     Osteopenia     Pulmonary nodule     Recurrent UTI     Renal manifestation of secondary diabetes mellitus     Secondary hyperparathyroidism, renal     Urinary retention        Past Surgical History:   Procedure Laterality Date    AV FISTULA PLACEMENT Left 2/14/2024    Procedure: CREATION, AV FISTULA;  Surgeon: RODRIGO Friedman III, MD;  Location: [facility] OR 2ND FLR;  Service: Vascular;  Laterality: Left;  LUE AVF creation vs AVG insertion    BIOPSY OF URETER Left 2/18/2022    Procedure: BIOPSY, URETER;  Surgeon: Ronel Whittington MD;  Location: [facility] OR 1ST FLR;  Service: Urology;  Laterality: Left;    BREAST BIOPSY Right     benign    CHOLECYSTECTOMY      COLONOSCOPY N/A 1/13/2017    Procedure: COLONOSCOPY;  Surgeon: Morris Wiseman MD;  Location: [facility] ENDO (4TH FLR);  Service: Endoscopy;  Laterality: N/A;  Renal pt Nephrology advised to avoid phosphate preps    COLONOSCOPY N/A 12/7/2023    Procedure: COLONOSCOPY;  Surgeon: Herve Allen MD;  Location: [facility] ENDO (2ND FLR);  Service: Endoscopy;  Laterality: N/A;  HD MWF; labs prior  suprapubic catheter  pt does not ambulate-uses christina lift- will have sling with her  9/7 ref. by Anastasiia Campbell DO, PEG, instr. mailed, Eliquis hold approval pending-st  ok to hold Eliquis 2 days per Dr Navarro-GT  1/30-precall complete-MS    CYSTOSCOPY N/A 10/8/2018    Procedure: CYSTOSCOPY;  Surgeon: Ronel Whittington MD;  [facility];  Service: Urology;  Laterality: N/A;  45 min    CYSTOSCOPY N/A 3/25/2019    Procedure: CYSTOSCOPY;  Surgeon: Ronel Whittington MD;  [facility];  Service: Urology;  Laterality: N/A;  45 min    CYSTOSCOPY N/A 8/26/2019    Procedure: CYSTOSCOPY;  Surgeon: Ronel Whittington MD;  [facility];  Service: Urology;  Laterality: N/A;  45 min    CYSTOSCOPY N/A 7/2/2021     Procedure: CYSTOSCOPY;  Surgeon: Ronel Whittington MD;  Location: [facility] OR [address] [place] [address];  Service: Urology;  Laterality: N/A;    CYSTOSCOPY  12/23/2021    Procedure: CYSTOSCOPY;  Surgeon: Ronel Whittington MD;  [facility];  Service: Urology;;    CYSTOSCOPY  2/18/2022    Procedure: CYSTOSCOPY;  Surgeon: Ronel Whittington MD;  Location: [facility] OR [address] [place] [address];  Service: Urology;;    CYSTOSCOPY W/ URETERAL STENT PLACEMENT Left 5/15/2021    Procedure: CYSTOSCOPY, WITH URETERAL STENT INSERTION;  Surgeon: Levy Sánchez Jr., MD;  Location: [facility] OR [address] [place] [address];  Service: Urology;  Laterality: Left;    CYSTOSCOPY WITH BIOPSY OF BLADDER N/A 1/27/2020    Procedure: CYSTOSCOPY, WITH BLADDER BIOPSY, WITH FULGURATION IF INDICATED;  Surgeon: Ronel Whittington MD;  Location: [facility] OR [address] [place] [address];  Service: Urology;  Laterality: N/A;    DILATION AND CURETTAGE OF UTERUS      FISTULOGRAM N/A 4/29/2024    Procedure: Fistulogram;  Surgeon: RODRIGO Friedman III, MD;  Location: [facility] CATH LAB;  Service: Peripheral Vascular;  Laterality: N/A;    GALLBLADDER SURGERY  2006    HYSTERECTOMY      INJECTION FOR SENTINEL NODE IDENTIFICATION Left 8/1/2019    Procedure: INJECTION, FOR SENTINEL NODE IDENTIFICATION;  Surgeon: Samia Fulton MD;  Location: [facility] OR [address] [place] [address];  Service: General;  Laterality: Left;    INJECTION OF BOTULINUM TOXIN TYPE A N/A 10/8/2018    Procedure: INJECTION, BOTULINUM TOXIN, TYPE A 300 UNITS;  Surgeon: Ronel hWittington MD;  Location: [facility] OR [address] [place] [address];  Service: Urology;  Laterality: N/A;    INJECTION OF BOTULINUM TOXIN TYPE A N/A 3/25/2019    Procedure: INJECTION, BOTULINUM TOXIN, TYPE A 300 UNITS;  Surgeon: Ronel Whittington MD;  Location: [facility] OR [address] [place] [address];  Service: Urology;  Laterality: N/A;    INJECTION OF BOTULINUM TOXIN TYPE A N/A 8/26/2019    Procedure: INJECTION, BOTULINUM TOXIN, TYPE A 300 UNITS;  Surgeon: Ronel Whittington MD;  Location: [facility] OR [address] [place] [address];  Service: Urology;   Laterality: N/A;    MASTECTOMY Left 8/1/2019    Procedure: MASTECTOMY 23 hour stay;  Surgeon: Samia Fulton MD;  [facility];  Service: General;  Laterality: Left;    MEDIPORT REMOVAL Right 6/24/2022    Procedure: REMOVAL, CATHETER, CENTRAL VENOUS, TUNNELED, WITH PORT;  Surgeon: Isauro Anderson MD;  Location: [facility] LAB;  Service: Radiology;  Laterality: Right;    OVARIAN CYST SURGERY  1985    PERCUTANEOUS TRANSLUMINAL ANGIOPLASTY OF ARTERIOVENOUS FISTULA Left 4/29/2024    Procedure: PTA, AV FISTULA;  Surgeon: RODRIGO rFiedman III, MD;  Location: [facility] CATH LAB;  Service: Peripheral Vascular;  Laterality: Left;    REPLACEMENT OF STENT Left 12/23/2021    Procedure: REPLACEMENT, STENT;  Surgeon: Ronel Whittington MD;  [facility];  Service: Urology;  Laterality: Left;    REPLACEMENT OF STENT Left 2/18/2022    Procedure: REPLACEMENT, STENT;  Surgeon: Ronel Whittington MD;  [facility];  Service: Urology;  Laterality: Left;    RETROGRADE PYELOGRAPHY Left 2/18/2022    Procedure: PYELOGRAM, RETROGRADE;  Surgeon: Ronel Whittington MD;  [facility];  Service: Urology;  Laterality: Left;    SENTINEL LYMPH NODE BIOPSY Left 8/1/2019    Procedure: BIOPSY, LYMPH NODE, SENTINEL;  Surgeon: Samia Fulton MD;  [facility];  Service: General;  Laterality: Left;    spt placement      TONSILLECTOMY, ADENOIDECTOMY      TOTAL ABDOMINAL HYSTERECTOMY W/ BILATERAL SALPINGOOPHORECTOMY  1985    URETEROSCOPY Left 2/18/2022    Procedure: URETEROSCOPY;  Surgeon: Ronel Whittington MD;  Location: [facility] OR [address] [place] [address];  Service: Urology;  Laterality: Left;         Current Outpatient Medications:     amLODIPine (NORVASC) 10 MG tablet, Take 10 mg by mouth., Disp: , Rfl:     anastrozole (ARIMIDEX) 1 mg Tab, TAKE 1 TABLET BY MOUTH EVERY DAY, Disp: 90 tablet, Rfl: 2    apixaban (ELIQUIS) 5 mg Tab, Take 1 tablet (5 mg total) by mouth Daily., Disp: , Rfl:      "atorvastatin (LIPITOR) 80 MG tablet, Take 1 tablet (80 mg total) by mouth once daily., Disp: 90 tablet, Rfl: 3    BD INSULIN SYRINGE ULTRA-FINE 0.5 mL 31 gauge x 5/16" Syrg, USE WITH INSULIN 4 TIMES A DAY, Disp: 100 each, Rfl: 12    calcium acetate,phosphat bind, (PHOSLO) 667 mg capsule, Take 667 mg by mouth 3 (three) times daily with meals., Disp: , Rfl:     carvediloL (COREG) 6.25 MG tablet, Take 1 tablet (6.25 mg total) by mouth 2 (two) times daily., Disp: 60 tablet, Rfl: 11    ceFAZolin 2 gram SolR, , Disp: , Rfl:     DIPRIVAN 10 mg/mL infusion, , Disp: , Rfl:     DULoxetine (CYMBALTA) 20 MG capsule, Take 2 capsules (40 mg total) by mouth once daily., Disp: 180 capsule, Rfl: 1    erenumab-aooe (AIMOVIG AUTOINJECTOR) 140 mg/mL autoinjector, 140 mg MONTHLY (route: subcutaneous), Disp: 1 each, Rfl: 11    ergocalciferol (ERGOCALCIFEROL) 50,000 unit Cap, Take 1 capsule (50,000 Units total) by mouth every 7 days., Disp: 12 capsule, Rfl: 3    fentaNYL (SUBLIMAZE) 50 mcg/mL injection, , Disp: , Rfl:     furosemide (LASIX) 40 MG tablet, Take 1 tablet (40 mg total) by mouth once daily., Disp: 30 tablet, Rfl: 11    heparin sodium,porcine (HEPARIN, PORCINE,) 1,000 unit/mL injection, , Disp: , Rfl:     HYDROcodone-acetaminophen (NORCO)  mg per tablet, Take 1 tablet by mouth every 6 (six) hours as needed for Pain., Disp: 120 tablet, Rfl: 0    LANTUS SOLOSTAR U-100 INSULIN 100 unit/mL (3 mL) InPn pen, INJECT 12-15 UNITS UNDER THE SKIN at night, Disp: 15 mL, Rfl: 3    losartan (COZAAR) 100 MG tablet, Take 1 tablet (100 mg total) by mouth once daily., Disp: 90 tablet, Rfl: 3    losartan (COZAAR) 50 MG tablet, Take 50 mg by mouth., Disp: , Rfl:     methocarbamoL (ROBAXIN) 750 MG Tab, TAKE 1-2 TABLETS (750-1,500 MG TOTAL) BY MOUTH 3 (THREE) TIMES DAILY AS NEEDED (MUSCLE SPASMS)., Disp: 180 tablet, Rfl: 1    midazolam (VERSED) 1 mg/mL injection, , Disp: , Rfl:     ondansetron (ZOFRAN-ODT) 8 MG TbDL, 8 mg EVERY 12 HOURS " "(route: oral), Disp: , Rfl:     oxybutynin (DITROPAN-XL) 10 MG 24 hr tablet, TAKE 1 TABLET BY MOUTH EVERY DAY, Disp: 90 tablet, Rfl: 4    oxyCODONE (ROXICODONE) 5 MG immediate release tablet, Take 1 tablet (5 mg total) by mouth daily as needed (severe pain)., Disp: 30 tablet, Rfl: 0    pen needle, diabetic (BD ULTRA-FINE SHORT PEN NEEDLE) 31 gauge x 5/16" Ndle, USE WITH LANTUS DAILY, e 11.65, Disp: 100 each, Rfl: 3    PHENYLephrine 10 mg/mL injection, , Disp: , Rfl:     POLOCAINE-MPF 15 mg/mL (1.5 %) injection, , Disp: , Rfl:     SYNTHROID 50 mcg tablet, TAKE 1 TABLET BY MOUTH BEFORE BREAKFAST. ADMINISTER ON AN EMPTY STOMACH AT LEAST 30 MINUTES BEFORE FOOD. IF RECEIVING TUBE FEEDS, HOLD TUBE FEEDS FOR 1 HOUR BEFORE AND AFTER LEVOTHYROXINE ADMINISTRATION., Disp: 90 tablet, Rfl: 2    tenapanor 30 mg Tab, Take 30 mg by mouth 2 (two) times a day. Take 1 tablet in the morning with meal and 1 tablet in the afternoon with meal, Disp: , Rfl:     traZODone (DESYREL) 100 MG tablet, TAKE 1 TABLET BY MOUTH NIGHTLY AS NEEDED FOR INSOMNIA., Disp: 90 tablet, Rfl: 2    VELPHORO 500 mg Chew, CHEW INSERT TABLET 5 TIMES DAILY WITH MEAL AND SNACK, Disp: , Rfl:     sumatriptan (IMITREX) 100 MG tablet, Take 1 tablet (100 mg total) by mouth 2 (two) times daily as needed for Migraine (Do not take more than 2 in 24 hours or 3 in a week)., Disp: 30 tablet, Rfl: 1  No current facility-administered medications for this visit.    Facility-Administered Medications Ordered in Other Visits:     epoetin chloe injection 2,000 Units, 2,000 Units, Intravenous, 1 time in Clinic/HOD, Emmanuel Hare Jr., MD    heparin (porcine) injection 2,000 Units, 2,000 Units, Intravenous, Once, Emmanuel Hare Jr., MD    heparin (porcine) injection 2,000 Units, 2,000 Units, Intravenous, Once, Emmanuel Hare Jr., MD    heparin (porcine) injection 4,000 Units, 4,000 Units, Intra-Catheter, 1 time in Clinic/HOD, Emmanuel Hare Jr., MD    iron " sucrose injection 100 mg, 100 mg, Intravenous, 1 time in Clinic/HOD, Emmanuel Hare Jr., MD    Review of patient's allergies indicates:  No Known Allergies    Family History   Problem Relation Name Age of Onset    Diabetes Sister Kat     Kidney disease Sister Kat         CKD III    ALS Mother          d.    Cancer Maternal Grandmother          d. colon    Cancer Paternal Grandfather          d. lung    Scoliosis Brother [place]         increased pain    Prostate cancer Brother [place]         cured s/p surgery    Cancer Brother [place]         rare cancer that got into the bones    Diabetes Maternal Aunt      Kidney disease Maternal Aunt      Diabetes Maternal Uncle      Amblyopia Neg Hx      Blindness Neg Hx      Cataracts Neg Hx      Glaucoma Neg Hx      Macular degeneration Neg Hx      Retinal detachment Neg Hx      Strabismus Neg Hx         Social History     Tobacco Use    Smoking status: Never    Smokeless tobacco: Never   Substance Use Topics    Alcohol use: No     Alcohol/week: 0.0 standard drinks of alcohol    Drug use: No     PHYSICAL EXAM:   BP (!) 170/73 (BP Location: Right arm, Patient Position: Sitting, BP Method: Medium (Automatic))   Pulse 78   Temp 98.3 °F (36.8 °C) (Oral)   Constitutional:  Alert,   Well-appearing  In no distress.  Traveling by wheelchair   Neurological: Normal speech  no focal findings  CN II - XII grossly intact.    Psychiatric: Mood and affect appropriate and symmetric.   HEENT: Normocephalic / atraumatic  PERRLA  Midline trachea  No scars across the neck   Cardiac: Regular rate and rhythm.   Pulmonary: Normal pulmonary effort.   Abdomen: Soft, not distended.     Skin: Warm and well perfused.    Vascular:  2+ left radial pulse.  Stable   Extremities/  Musculoskeletal: Left arm:  Left brachiocephalic AV fistula has significant excellent thrill with no significant pulsatility.    Fistula feels well matured and superficial    No arm swelling     IMAGING:  Duplex of the fistula  shows no significant stenosis flow volume increased to 1.28 L  Diameter 9 point 2 proximal, 9.3 minute, 6.7 distal  Depth 6.3 proximal, 10.2 mid      IMPRESSION:    Four months status post left brachiocephalic AV fistula.  This is very well matured.  Clinically the fistula feels more superficial than suggested by the duplex.  I would be optimistic this should be easily cannulate able    2.  Narcotic dependence for chronic back pain.       PLAN:   May begin cannulating 06/12/2024  Follow up in 1 month for PermCath removal sooner if clinically indicated  We will need to hold her Eliquis for 2 days prior to the clinic visit    WALE Friedman III, MD, FACS  Professor and Chief, Vascular and Endovascular Surgery

## 2024-06-12 ENCOUNTER — HOSPITAL ENCOUNTER (INPATIENT)
Facility: HOSPITAL | Age: 74
LOS: 3 days | Discharge: HOME HEALTH | DRG: 698 | End: 2024-06-15
Attending: EMERGENCY MEDICINE | Admitting: STUDENT IN AN ORGANIZED HEALTH CARE EDUCATION/TRAINING PROGRAM
Payer: MEDICARE

## 2024-06-12 DIAGNOSIS — R07.9 CHEST PAIN: ICD-10-CM

## 2024-06-12 DIAGNOSIS — A41.9 SEPSIS, DUE TO UNSPECIFIED ORGANISM, UNSPECIFIED WHETHER ACUTE ORGAN DYSFUNCTION PRESENT: Primary | ICD-10-CM

## 2024-06-12 DIAGNOSIS — A41.9 SEPSIS: ICD-10-CM

## 2024-06-12 DIAGNOSIS — R00.0 TACHYCARDIA: ICD-10-CM

## 2024-06-12 DIAGNOSIS — N39.0 URINARY TRACT INFECTION ASSOCIATED WITH INDWELLING URETHRAL CATHETER, INITIAL ENCOUNTER: ICD-10-CM

## 2024-06-12 DIAGNOSIS — T83.511A URINARY TRACT INFECTION ASSOCIATED WITH INDWELLING URETHRAL CATHETER, INITIAL ENCOUNTER: ICD-10-CM

## 2024-06-12 PROBLEM — N18.6 TYPE 2 DIABETES MELLITUS WITH CHRONIC KIDNEY DISEASE ON CHRONIC DIALYSIS, WITH LONG-TERM CURRENT USE OF INSULIN: Status: RESOLVED | Noted: 2020-02-07 | Resolved: 2024-06-12

## 2024-06-12 PROBLEM — Z79.4 TYPE 2 DIABETES MELLITUS WITH CHRONIC KIDNEY DISEASE ON CHRONIC DIALYSIS, WITH LONG-TERM CURRENT USE OF INSULIN: Status: RESOLVED | Noted: 2020-02-07 | Resolved: 2024-06-12

## 2024-06-12 PROBLEM — I15.2 HYPERTENSION ASSOCIATED WITH DIABETES: Chronic | Status: RESOLVED | Noted: 2022-01-24 | Resolved: 2024-06-12

## 2024-06-12 PROBLEM — E11.22 TYPE 2 DIABETES MELLITUS WITH CHRONIC KIDNEY DISEASE ON CHRONIC DIALYSIS, WITH LONG-TERM CURRENT USE OF INSULIN: Status: RESOLVED | Noted: 2020-02-07 | Resolved: 2024-06-12

## 2024-06-12 PROBLEM — E11.29 TYPE 2 DIABETES MELLITUS WITH KIDNEY COMPLICATION, WITH LONG-TERM CURRENT USE OF INSULIN: Status: ACTIVE | Noted: 2024-06-12

## 2024-06-12 PROBLEM — I10 HTN (HYPERTENSION): Status: ACTIVE | Noted: 2024-06-12

## 2024-06-12 PROBLEM — N18.6 TYPE 2 DIABETES MELLITUS WITH CHRONIC KIDNEY DISEASE ON CHRONIC DIALYSIS, WITH LONG-TERM CURRENT USE OF INSULIN: Status: ACTIVE | Noted: 2020-02-07

## 2024-06-12 PROBLEM — Z99.2 TYPE 2 DIABETES MELLITUS WITH CHRONIC KIDNEY DISEASE ON CHRONIC DIALYSIS, WITH LONG-TERM CURRENT USE OF INSULIN: Status: RESOLVED | Noted: 2020-02-07 | Resolved: 2024-06-12

## 2024-06-12 PROBLEM — E11.59 HYPERTENSION ASSOCIATED WITH DIABETES: Chronic | Status: RESOLVED | Noted: 2022-01-24 | Resolved: 2024-06-12

## 2024-06-12 PROBLEM — Z79.4 TYPE 2 DIABETES MELLITUS WITH KIDNEY COMPLICATION, WITH LONG-TERM CURRENT USE OF INSULIN: Status: ACTIVE | Noted: 2024-06-12

## 2024-06-12 PROBLEM — Z99.2 TYPE 2 DIABETES MELLITUS WITH CHRONIC KIDNEY DISEASE ON CHRONIC DIALYSIS, WITH LONG-TERM CURRENT USE OF INSULIN: Status: ACTIVE | Noted: 2020-02-07

## 2024-06-12 LAB
ALBUMIN SERPL BCP-MCNC: 3 G/DL (ref 3.5–5.2)
ALLENS TEST: NORMAL
ALP SERPL-CCNC: 115 U/L (ref 55–135)
ALT SERPL W/O P-5'-P-CCNC: 9 U/L (ref 10–44)
ANION GAP SERPL CALC-SCNC: 12 MMOL/L (ref 8–16)
AST SERPL-CCNC: 11 U/L (ref 10–40)
BACTERIA #/AREA URNS AUTO: ABNORMAL /HPF
BASOPHILS # BLD AUTO: 0.04 K/UL (ref 0–0.2)
BASOPHILS NFR BLD: 0.2 % (ref 0–1.9)
BILIRUB SERPL-MCNC: 0.3 MG/DL (ref 0.1–1)
BILIRUB UR QL STRIP: NEGATIVE
BUN SERPL-MCNC: 30 MG/DL (ref 8–23)
CALCIUM SERPL-MCNC: 9 MG/DL (ref 8.7–10.5)
CHLORIDE SERPL-SCNC: 98 MMOL/L (ref 95–110)
CLARITY UR REFRACT.AUTO: ABNORMAL
CO2 SERPL-SCNC: 18 MMOL/L (ref 23–29)
COLOR UR AUTO: YELLOW
CREAT SERPL-MCNC: 3.3 MG/DL (ref 0.5–1.4)
DIFFERENTIAL METHOD BLD: ABNORMAL
EOSINOPHIL # BLD AUTO: 0.1 K/UL (ref 0–0.5)
EOSINOPHIL NFR BLD: 0.3 % (ref 0–8)
ERYTHROCYTE [DISTWIDTH] IN BLOOD BY AUTOMATED COUNT: 15.3 % (ref 11.5–14.5)
EST. GFR  (NO RACE VARIABLE): 14.4 ML/MIN/1.73 M^2
GLUCOSE SERPL-MCNC: 154 MG/DL (ref 70–110)
GLUCOSE UR QL STRIP: ABNORMAL
HCT VFR BLD AUTO: 32.3 % (ref 37–48.5)
HGB BLD-MCNC: 10.3 G/DL (ref 12–16)
HGB UR QL STRIP: ABNORMAL
HYALINE CASTS UR QL AUTO: 0 /LPF
IMM GRANULOCYTES # BLD AUTO: 0.17 K/UL (ref 0–0.04)
IMM GRANULOCYTES NFR BLD AUTO: 1.1 % (ref 0–0.5)
KETONES UR QL STRIP: NEGATIVE
LDH SERPL L TO P-CCNC: 0.86 MMOL/L (ref 0.5–2.2)
LEUKOCYTE ESTERASE UR QL STRIP: ABNORMAL
LYMPHOCYTES # BLD AUTO: 0.6 K/UL (ref 1–4.8)
LYMPHOCYTES NFR BLD: 3.9 % (ref 18–48)
MCH RBC QN AUTO: 30.1 PG (ref 27–31)
MCHC RBC AUTO-ENTMCNC: 31.9 G/DL (ref 32–36)
MCV RBC AUTO: 94 FL (ref 82–98)
MICROSCOPIC COMMENT: ABNORMAL
MONOCYTES # BLD AUTO: 0.6 K/UL (ref 0.3–1)
MONOCYTES NFR BLD: 3.5 % (ref 4–15)
NEUTROPHILS # BLD AUTO: 14.7 K/UL (ref 1.8–7.7)
NEUTROPHILS NFR BLD: 91 % (ref 38–73)
NITRITE UR QL STRIP: NEGATIVE
NRBC BLD-RTO: 0 /100 WBC
PH UR STRIP: 6 [PH] (ref 5–8)
PLATELET # BLD AUTO: 304 K/UL (ref 150–450)
PMV BLD AUTO: 9 FL (ref 9.2–12.9)
POCT GLUCOSE: 228 MG/DL (ref 70–110)
POCT GLUCOSE: 251 MG/DL (ref 70–110)
POTASSIUM SERPL-SCNC: 4.4 MMOL/L (ref 3.5–5.1)
PROT SERPL-MCNC: 6.8 G/DL (ref 6–8.4)
PROT UR QL STRIP: ABNORMAL
RBC # BLD AUTO: 3.42 M/UL (ref 4–5.4)
RBC #/AREA URNS AUTO: 4 /HPF (ref 0–4)
SAMPLE: NORMAL
SITE: NORMAL
SODIUM SERPL-SCNC: 128 MMOL/L (ref 136–145)
SODIUM UR-SCNC: 30 MMOL/L (ref 20–250)
SP GR UR STRIP: 1.01 (ref 1–1.03)
SQUAMOUS #/AREA URNS AUTO: 2 /HPF
URN SPEC COLLECT METH UR: ABNORMAL
WBC # BLD AUTO: 16.16 K/UL (ref 3.9–12.7)
WBC #/AREA URNS AUTO: >100 /HPF (ref 0–5)
WBC CLUMPS UR QL AUTO: ABNORMAL

## 2024-06-12 PROCEDURE — 99285 EMERGENCY DEPT VISIT HI MDM: CPT | Mod: 25,HCNC

## 2024-06-12 PROCEDURE — 82962 GLUCOSE BLOOD TEST: CPT | Mod: HCNC

## 2024-06-12 PROCEDURE — 81001 URINALYSIS AUTO W/SCOPE: CPT | Mod: HCNC | Performed by: EMERGENCY MEDICINE

## 2024-06-12 PROCEDURE — 20600001 HC STEP DOWN PRIVATE ROOM: Mod: HCNC

## 2024-06-12 PROCEDURE — 99900035 HC TECH TIME PER 15 MIN (STAT): Mod: HCNC

## 2024-06-12 PROCEDURE — 63600175 PHARM REV CODE 636 W HCPCS: Mod: HCNC | Performed by: PHYSICIAN ASSISTANT

## 2024-06-12 PROCEDURE — 83605 ASSAY OF LACTIC ACID: CPT | Mod: HCNC

## 2024-06-12 PROCEDURE — 93010 ELECTROCARDIOGRAM REPORT: CPT | Mod: HCNC,,, | Performed by: INTERNAL MEDICINE

## 2024-06-12 PROCEDURE — 84300 ASSAY OF URINE SODIUM: CPT | Mod: HCNC | Performed by: STUDENT IN AN ORGANIZED HEALTH CARE EDUCATION/TRAINING PROGRAM

## 2024-06-12 PROCEDURE — 85025 COMPLETE CBC W/AUTO DIFF WBC: CPT | Mod: HCNC | Performed by: EMERGENCY MEDICINE

## 2024-06-12 PROCEDURE — 83935 ASSAY OF URINE OSMOLALITY: CPT | Mod: HCNC | Performed by: STUDENT IN AN ORGANIZED HEALTH CARE EDUCATION/TRAINING PROGRAM

## 2024-06-12 PROCEDURE — 96360 HYDRATION IV INFUSION INIT: CPT | Mod: 59,HCNC

## 2024-06-12 PROCEDURE — 93005 ELECTROCARDIOGRAM TRACING: CPT | Mod: HCNC

## 2024-06-12 PROCEDURE — 25000003 PHARM REV CODE 250: Mod: HCNC | Performed by: PHYSICIAN ASSISTANT

## 2024-06-12 PROCEDURE — 96365 THER/PROPH/DIAG IV INF INIT: CPT | Mod: HCNC

## 2024-06-12 PROCEDURE — 87086 URINE CULTURE/COLONY COUNT: CPT | Mod: HCNC | Performed by: EMERGENCY MEDICINE

## 2024-06-12 PROCEDURE — 25000003 PHARM REV CODE 250: Mod: HCNC | Performed by: STUDENT IN AN ORGANIZED HEALTH CARE EDUCATION/TRAINING PROGRAM

## 2024-06-12 PROCEDURE — 87040 BLOOD CULTURE FOR BACTERIA: CPT | Mod: HCNC | Performed by: EMERGENCY MEDICINE

## 2024-06-12 PROCEDURE — 80053 COMPREHEN METABOLIC PANEL: CPT | Mod: HCNC | Performed by: EMERGENCY MEDICINE

## 2024-06-12 PROCEDURE — 63600175 PHARM REV CODE 636 W HCPCS: Mod: HCNC | Performed by: STUDENT IN AN ORGANIZED HEALTH CARE EDUCATION/TRAINING PROGRAM

## 2024-06-12 PROCEDURE — 63600175 PHARM REV CODE 636 W HCPCS: Mod: HCNC | Performed by: EMERGENCY MEDICINE

## 2024-06-12 RX ORDER — AMLODIPINE BESYLATE 10 MG/1
10 TABLET ORAL DAILY
Status: DISCONTINUED | OUTPATIENT
Start: 2024-06-13 | End: 2024-06-15 | Stop reason: HOSPADM

## 2024-06-12 RX ORDER — DULOXETIN HYDROCHLORIDE 20 MG/1
40 CAPSULE, DELAYED RELEASE ORAL DAILY
Status: DISCONTINUED | OUTPATIENT
Start: 2024-06-13 | End: 2024-06-15 | Stop reason: HOSPADM

## 2024-06-12 RX ORDER — TALC
6 POWDER (GRAM) TOPICAL NIGHTLY PRN
Status: DISCONTINUED | OUTPATIENT
Start: 2024-06-12 | End: 2024-06-12

## 2024-06-12 RX ORDER — SODIUM CHLORIDE 0.9 % (FLUSH) 0.9 %
5 SYRINGE (ML) INJECTION
Status: DISCONTINUED | OUTPATIENT
Start: 2024-06-12 | End: 2024-06-15 | Stop reason: HOSPADM

## 2024-06-12 RX ORDER — ACETAMINOPHEN 325 MG/1
650 TABLET ORAL
Status: COMPLETED | OUTPATIENT
Start: 2024-06-12 | End: 2024-06-12

## 2024-06-12 RX ORDER — INSULIN GLARGINE 100 [IU]/ML
10 INJECTION, SOLUTION SUBCUTANEOUS NIGHTLY
Status: DISCONTINUED | OUTPATIENT
Start: 2024-06-12 | End: 2024-06-15 | Stop reason: HOSPADM

## 2024-06-12 RX ORDER — METHOCARBAMOL 500 MG/1
500 TABLET, FILM COATED ORAL 4 TIMES DAILY
Status: DISCONTINUED | OUTPATIENT
Start: 2024-06-12 | End: 2024-06-15 | Stop reason: HOSPADM

## 2024-06-12 RX ORDER — NALOXONE HCL 0.4 MG/ML
0.02 VIAL (ML) INJECTION
Status: DISCONTINUED | OUTPATIENT
Start: 2024-06-12 | End: 2024-06-15 | Stop reason: HOSPADM

## 2024-06-12 RX ORDER — SIMETHICONE 80 MG
1 TABLET,CHEWABLE ORAL 4 TIMES DAILY PRN
Status: DISCONTINUED | OUTPATIENT
Start: 2024-06-12 | End: 2024-06-15 | Stop reason: HOSPADM

## 2024-06-12 RX ORDER — LEVOTHYROXINE SODIUM 50 UG/1
50 TABLET ORAL
Status: DISCONTINUED | OUTPATIENT
Start: 2024-06-13 | End: 2024-06-15 | Stop reason: HOSPADM

## 2024-06-12 RX ORDER — ACETAMINOPHEN 325 MG/1
650 TABLET ORAL EVERY 4 HOURS PRN
Status: DISCONTINUED | OUTPATIENT
Start: 2024-06-12 | End: 2024-06-15 | Stop reason: HOSPADM

## 2024-06-12 RX ORDER — IBUPROFEN 200 MG
24 TABLET ORAL
Status: DISCONTINUED | OUTPATIENT
Start: 2024-06-12 | End: 2024-06-15 | Stop reason: HOSPADM

## 2024-06-12 RX ORDER — SUMATRIPTAN 50 MG/1
100 TABLET, FILM COATED ORAL 2 TIMES DAILY PRN
Status: DISCONTINUED | OUTPATIENT
Start: 2024-06-12 | End: 2024-06-15 | Stop reason: HOSPADM

## 2024-06-12 RX ORDER — BISACODYL 10 MG/1
10 SUPPOSITORY RECTAL DAILY PRN
Status: DISCONTINUED | OUTPATIENT
Start: 2024-06-12 | End: 2024-06-15 | Stop reason: HOSPADM

## 2024-06-12 RX ORDER — INSULIN ASPART 100 [IU]/ML
0-5 INJECTION, SOLUTION INTRAVENOUS; SUBCUTANEOUS
Status: DISCONTINUED | OUTPATIENT
Start: 2024-06-12 | End: 2024-06-15 | Stop reason: HOSPADM

## 2024-06-12 RX ORDER — HYDROCODONE BITARTRATE AND ACETAMINOPHEN 10; 325 MG/1; MG/1
1 TABLET ORAL EVERY 6 HOURS PRN
Status: DISCONTINUED | OUTPATIENT
Start: 2024-06-12 | End: 2024-06-15 | Stop reason: HOSPADM

## 2024-06-12 RX ORDER — OXYBUTYNIN CHLORIDE 5 MG/1
10 TABLET, EXTENDED RELEASE ORAL DAILY
Status: DISCONTINUED | OUTPATIENT
Start: 2024-06-13 | End: 2024-06-15 | Stop reason: HOSPADM

## 2024-06-12 RX ORDER — FUROSEMIDE 40 MG/1
40 TABLET ORAL DAILY
Status: DISCONTINUED | OUTPATIENT
Start: 2024-06-13 | End: 2024-06-15 | Stop reason: HOSPADM

## 2024-06-12 RX ORDER — LOSARTAN POTASSIUM 50 MG/1
100 TABLET ORAL DAILY
Status: DISCONTINUED | OUTPATIENT
Start: 2024-06-13 | End: 2024-06-15 | Stop reason: HOSPADM

## 2024-06-12 RX ORDER — TRAZODONE HYDROCHLORIDE 50 MG/1
100 TABLET ORAL NIGHTLY PRN
Status: DISCONTINUED | OUTPATIENT
Start: 2024-06-12 | End: 2024-06-15 | Stop reason: HOSPADM

## 2024-06-12 RX ORDER — IPRATROPIUM BROMIDE AND ALBUTEROL SULFATE 2.5; .5 MG/3ML; MG/3ML
3 SOLUTION RESPIRATORY (INHALATION) EVERY 4 HOURS PRN
Status: DISCONTINUED | OUTPATIENT
Start: 2024-06-12 | End: 2024-06-15 | Stop reason: HOSPADM

## 2024-06-12 RX ORDER — GLUCAGON 1 MG
1 KIT INJECTION
Status: DISCONTINUED | OUTPATIENT
Start: 2024-06-12 | End: 2024-06-15 | Stop reason: HOSPADM

## 2024-06-12 RX ORDER — CARVEDILOL 6.25 MG/1
6.25 TABLET ORAL 2 TIMES DAILY
Status: DISCONTINUED | OUTPATIENT
Start: 2024-06-12 | End: 2024-06-14

## 2024-06-12 RX ORDER — CALCIUM ACETATE 667 MG/1
667 CAPSULE ORAL
Status: DISCONTINUED | OUTPATIENT
Start: 2024-06-13 | End: 2024-06-15 | Stop reason: HOSPADM

## 2024-06-12 RX ORDER — ONDANSETRON 8 MG/1
8 TABLET, ORALLY DISINTEGRATING ORAL EVERY 8 HOURS PRN
Status: DISCONTINUED | OUTPATIENT
Start: 2024-06-12 | End: 2024-06-15 | Stop reason: HOSPADM

## 2024-06-12 RX ORDER — HYDRALAZINE HYDROCHLORIDE 25 MG/1
25 TABLET, FILM COATED ORAL EVERY 8 HOURS PRN
Status: DISCONTINUED | OUTPATIENT
Start: 2024-06-12 | End: 2024-06-15 | Stop reason: HOSPADM

## 2024-06-12 RX ORDER — HEPARIN SODIUM 5000 [USP'U]/ML
5000 INJECTION, SOLUTION INTRAVENOUS; SUBCUTANEOUS EVERY 8 HOURS
Status: DISCONTINUED | OUTPATIENT
Start: 2024-06-12 | End: 2024-06-12

## 2024-06-12 RX ORDER — IBUPROFEN 200 MG
16 TABLET ORAL
Status: DISCONTINUED | OUTPATIENT
Start: 2024-06-12 | End: 2024-06-15 | Stop reason: HOSPADM

## 2024-06-12 RX ORDER — PROCHLORPERAZINE EDISYLATE 5 MG/ML
5 INJECTION INTRAMUSCULAR; INTRAVENOUS EVERY 6 HOURS PRN
Status: DISCONTINUED | OUTPATIENT
Start: 2024-06-12 | End: 2024-06-15 | Stop reason: HOSPADM

## 2024-06-12 RX ORDER — POLYETHYLENE GLYCOL 3350 17 G/17G
17 POWDER, FOR SOLUTION ORAL DAILY PRN
Status: DISCONTINUED | OUTPATIENT
Start: 2024-06-12 | End: 2024-06-13

## 2024-06-12 RX ORDER — ALUMINUM HYDROXIDE, MAGNESIUM HYDROXIDE, AND SIMETHICONE 1200; 120; 1200 MG/30ML; MG/30ML; MG/30ML
30 SUSPENSION ORAL 4 TIMES DAILY PRN
Status: DISCONTINUED | OUTPATIENT
Start: 2024-06-12 | End: 2024-06-15 | Stop reason: HOSPADM

## 2024-06-12 RX ORDER — ANASTROZOLE 1 MG/1
1 TABLET ORAL DAILY
Status: DISCONTINUED | OUTPATIENT
Start: 2024-06-13 | End: 2024-06-15 | Stop reason: HOSPADM

## 2024-06-12 RX ORDER — ATORVASTATIN CALCIUM 20 MG/1
80 TABLET, FILM COATED ORAL DAILY
Status: DISCONTINUED | OUTPATIENT
Start: 2024-06-13 | End: 2024-06-15 | Stop reason: HOSPADM

## 2024-06-12 RX ORDER — ACETAMINOPHEN 500 MG
1000 TABLET ORAL EVERY 8 HOURS PRN
Status: DISCONTINUED | OUTPATIENT
Start: 2024-06-12 | End: 2024-06-15 | Stop reason: HOSPADM

## 2024-06-12 RX ADMIN — INSULIN GLARGINE 10 UNITS: 100 INJECTION, SOLUTION SUBCUTANEOUS at 09:06

## 2024-06-12 RX ADMIN — ACETAMINOPHEN 650 MG: 325 TABLET ORAL at 03:06

## 2024-06-12 RX ADMIN — VANCOMYCIN HYDROCHLORIDE 2000 MG: 500 INJECTION, POWDER, LYOPHILIZED, FOR SOLUTION INTRAVENOUS at 05:06

## 2024-06-12 RX ADMIN — SODIUM CHLORIDE, POTASSIUM CHLORIDE, SODIUM LACTATE AND CALCIUM CHLORIDE 1000 ML: 600; 310; 30; 20 INJECTION, SOLUTION INTRAVENOUS at 12:06

## 2024-06-12 RX ADMIN — METHOCARBAMOL 500 MG: 500 TABLET ORAL at 09:06

## 2024-06-12 RX ADMIN — CARVEDILOL 6.25 MG: 6.25 TABLET, FILM COATED ORAL at 09:06

## 2024-06-12 RX ADMIN — PIPERACILLIN SODIUM AND TAZOBACTAM SODIUM 4.5 G: 4; .5 INJECTION, POWDER, FOR SOLUTION INTRAVENOUS at 03:06

## 2024-06-12 RX ADMIN — SODIUM CHLORIDE, POTASSIUM CHLORIDE, SODIUM LACTATE AND CALCIUM CHLORIDE 917 ML: 600; 310; 30; 20 INJECTION, SOLUTION INTRAVENOUS at 05:06

## 2024-06-12 NOTE — ED TRIAGE NOTES
Pt presents to the ED for a possible UTI. Pt states her nurse changed her murillo this morning and her urine was cloudy. Pt also had a temperature of 101.5. Pt also complains of 7/10 back pain at this time.

## 2024-06-12 NOTE — ED PROVIDER NOTES
Encounter Date: 6/12/2024       History     Chief Complaint   Patient presents with    Fever     T max 101.5F, HH nurse went to change her murillo today and urine is cloudy      73-year-old female with past medical history of CKD 4, hypertension, hyperlipidemia, ARON, breast CA, chronic indwelling urinary catheter who presents ED for concern for UTI.  Nurse at the SNF was concerned as her Murillo urine looked cloudy.  She arrives ED febrile tachycardic and tachypneic.  Denies any chest pain, shortness breath, cough, abdominal pain.  Temp in the ED is 101.5.    The history is provided by the patient.     Review of patient's allergies indicates:  No Known Allergies  Past Medical History:   Diagnosis Date    Cervicogenic migraine     Chronic pain     CKD (chronic kidney disease) stage 4, GFR 15-29 ml/min     [facility]    CKD (chronic kidney disease) stage 4, GFR 15-29 ml/min     Diabetes mellitus     Long term use of Insulin, Diabetic Neuropathy    Dialysis patient 1/21/2022    Fibromyalgia     Hydronephrosis     Hyperlipidemia     Hypertension 12/12/2012    Hypothyroidism 12/12/2012    ARON (iron deficiency anemia)     Insomnia     Levoscoliosis     Malignant neoplasm of upper-outer quadrant of left breast in female, estrogen receptor positive     Metabolic acidosis     Mobility impaired     Nuclear sclerosis - Both Eyes 3/24/2014    VIJAYA (obstructive sleep apnea)     Osteopenia     Pulmonary nodule     Recurrent UTI     Renal manifestation of secondary diabetes mellitus     Secondary hyperparathyroidism, renal     Urinary retention      Past Surgical History:   Procedure Laterality Date    AV FISTULA PLACEMENT Left 2/14/2024    Procedure: CREATION, AV FISTULA;  Surgeon: RODRIGO Friedman III, MD;  Location: [facility] OR [address] [place] [address];  Service: Vascular;  Laterality: Left;  LUE AVF creation vs AVG insertion    BIOPSY OF URETER Left 2/18/2022    Procedure: BIOPSY, URETER;  Surgeon: Roenl Whittington MD;  Location: [facility] OR [facility]  FLR;  Service: Urology;  Laterality: Left;    BREAST BIOPSY Right     benign    CHOLECYSTECTOMY      COLONOSCOPY N/A 1/13/2017    Procedure: COLONOSCOPY;  Surgeon: Morris Wiseman MD;  Location: [facility] ENDO (4TH FLR);  Service: Endoscopy;  Laterality: N/A;  Renal pt Nephrology advised to avoid phosphate preps    COLONOSCOPY N/A 12/7/2023    Procedure: COLONOSCOPY;  Surgeon: Herve Allen MD;  Location: [facility] ENDO (2ND FLR);  Service: Endoscopy;  Laterality: N/A;  HD MWF; labs prior  suprapubic catheter  pt does not ambulate-uses christina lift- will have sling with her  9/7 ref. by Anastasiia Campbell, , PEG, instr. mailed, Eliquis hold approval pending-[facility] to hold Eliquis 2 days per Dr Navarro-GT  1/30-precall complete-MS    CYSTOSCOPY N/A 10/8/2018    Procedure: CYSTOSCOPY;  Surgeon: Ronel Whittington MD;  Location: [facility] OR 1ST FLR;  Service: Urology;  Laterality: N/A;  45 min    CYSTOSCOPY N/A 3/25/2019    Procedure: CYSTOSCOPY;  Surgeon: Ronel Whittington MD;  [facility];  Service: Urology;  Laterality: N/A;  45 min    CYSTOSCOPY N/A 8/26/2019    Procedure: CYSTOSCOPY;  Surgeon: Ronel Whittington MD;  Location: [facility] OR [facility] FLR;  Service: Urology;  Laterality: N/A;  45 min    CYSTOSCOPY N/A 7/2/2021    Procedure: CYSTOSCOPY;  Surgeon: Ronel Whittington MD;  [facility];  Service: Urology;  Laterality: N/A;    CYSTOSCOPY  12/23/2021    Procedure: CYSTOSCOPY;  Surgeon: Ronel Whittington MD;  Location: [facility] OR [facility] FLR;  Service: Urology;;    CYSTOSCOPY  2/18/2022    Procedure: CYSTOSCOPY;  Surgeon: Ronel Whittington MD;  Location: [facility] OR 1ST FLR;  Service: Urology;;    CYSTOSCOPY W/ URETERAL STENT PLACEMENT Left 5/15/2021    Procedure: CYSTOSCOPY, WITH URETERAL STENT INSERTION;  Surgeon: Levy Sánchez Jr., MD;  Location: [facility] OR [address] [place] [address];  Service: Urology;  Laterality: Left;    CYSTOSCOPY WITH BIOPSY OF BLADDER N/A 1/27/2020    Procedure:  CYSTOSCOPY, WITH BLADDER BIOPSY, WITH FULGURATION IF INDICATED;  Surgeon: Ronel Whittington MD;  Location: [facility] OR [address] [place] [address];  Service: Urology;  Laterality: N/A;    DILATION AND CURETTAGE OF UTERUS      FISTULOGRAM N/A 4/29/2024    Procedure: Fistulogram;  Surgeon: RODRIGO Friedman III, MD;  Location: [facility] CATH LAB;  Service: Peripheral Vascular;  Laterality: N/A;    GALLBLADDER SURGERY  2006    HYSTERECTOMY      INJECTION FOR SENTINEL NODE IDENTIFICATION Left 8/1/2019    Procedure: INJECTION, FOR SENTINEL NODE IDENTIFICATION;  Surgeon: Samia Fulton MD;  Location: [facility] OR [address] [place] [address];  Service: General;  Laterality: Left;    INJECTION OF BOTULINUM TOXIN TYPE A N/A 10/8/2018    Procedure: INJECTION, BOTULINUM TOXIN, TYPE A 300 UNITS;  Surgeon: Ronel Whittington MD;  Location: [facility] OR [address] [place] [address];  Service: Urology;  Laterality: N/A;    INJECTION OF BOTULINUM TOXIN TYPE A N/A 3/25/2019    Procedure: INJECTION, BOTULINUM TOXIN, TYPE A 300 UNITS;  Surgeon: Ronel Whittington MD;  Location: [address];  Service: Urology;  Laterality: N/A;    INJECTION OF BOTULINUM TOXIN TYPE A N/A 8/26/2019    Procedure: INJECTION, BOTULINUM TOXIN, TYPE A 300 UNITS;  Surgeon: Ronel Whittington MD;  Location: [facility] OR [address] [place] [address];  Service: Urology;  Laterality: N/A;    MASTECTOMY Left 8/1/2019    Procedure: MASTECTOMY 23 hour stay;  Surgeon: Samia Fulton MD;  Location: [facility] OR [address] [place] [address];  Service: General;  Laterality: Left;    MEDIPORT REMOVAL Right 6/24/2022    Procedure: REMOVAL, CATHETER, CENTRAL VENOUS, TUNNELED, WITH PORT;  Surgeon: Isauro Anderson MD;  Location: [facility] CATH LAB;  Service: Radiology;  Laterality: Right;    OVARIAN CYST SURGERY  1985    PERCUTANEOUS TRANSLUMINAL ANGIOPLASTY OF ARTERIOVENOUS FISTULA Left 4/29/2024    Procedure: PTA, AV FISTULA;  Surgeon: RODRIGO Friedman III, MD;  Location: [facility] CATH LAB;  Service: Peripheral Vascular;  Laterality: Left;    REPLACEMENT OF STENT Left 12/23/2021     Procedure: REPLACEMENT, STENT;  Surgeon: Ronel Whittington MD;  [facility];  Service: Urology;  Laterality: Left;    REPLACEMENT OF STENT Left 2/18/2022    Procedure: REPLACEMENT, STENT;  Surgeon: Ronel Whittington MD;  Location: [facility] OR 1ST FLR;  Service: Urology;  Laterality: Left;    RETROGRADE PYELOGRAPHY Left 2/18/2022    Procedure: PYELOGRAM, RETROGRADE;  Surgeon: Ronel Whittington MD;  [facility];  Service: Urology;  Laterality: Left;    SENTINEL LYMPH NODE BIOPSY Left 8/1/2019    Procedure: BIOPSY, LYMPH NODE, SENTINEL;  Surgeon: Samia Fulton MD;  Location: [facility] OR 2ND FLR;  Service: General;  Laterality: Left;    spt placement      TONSILLECTOMY, ADENOIDECTOMY      TOTAL ABDOMINAL HYSTERECTOMY W/ BILATERAL SALPINGOOPHORECTOMY  1985    URETEROSCOPY Left 2/18/2022    Procedure: URETEROSCOPY;  Surgeon: Ronel Whittington MD;  Location: [facility] OR [address] [place] [address];  Service: Urology;  Laterality: Left;     Family History   Problem Relation Name Age of Onset    Diabetes Sister Kat     Kidney disease Sister Kat         CKD III    ALS Mother          d.    Cancer Maternal Grandmother          d. colon    Cancer Paternal Grandfather          d. lung    Scoliosis Brother [place]         increased pain    Prostate cancer Brother [place]         cured s/p surgery    Cancer Brother [place]         rare cancer that got into the bones    Diabetes Maternal Aunt      Kidney disease Maternal Aunt      Diabetes Maternal Uncle      Amblyopia Neg Hx      Blindness Neg Hx      Cataracts Neg Hx      Glaucoma Neg Hx      Macular degeneration Neg Hx      Retinal detachment Neg Hx      Strabismus Neg Hx       Social History     Tobacco Use    Smoking status: Never    Smokeless tobacco: Never   Substance Use Topics    Alcohol use: No     Alcohol/week: 0.0 standard drinks of alcohol    Drug use: No     Review of Systems   Constitutional:  Positive for fever.   HENT:  Negative for sore throat.     Respiratory:  Negative for cough and shortness of breath.    Cardiovascular:  Negative for chest pain.   Gastrointestinal:  Negative for abdominal pain, nausea and vomiting.   Musculoskeletal:  Positive for back pain (Chronic).   Skin: Negative.    Neurological:  Positive for headaches. Negative for weakness.   Psychiatric/Behavioral:  Negative for confusion.        Physical Exam     Initial Vitals [06/12/24 1111]   BP Pulse Resp Temp SpO2   (!) 182/84 (!) 118 (!) 24 (!) 101.5 °F (38.6 °C) 96 %      MAP       --         Physical Exam    Nursing note and vitals reviewed.  Constitutional: She appears well-developed and well-nourished.    Chronically ill-appearing   HENT:   Head: Normocephalic and atraumatic.   Eyes: Conjunctivae are normal. Pupils are equal, round, and reactive to light.   Neck: Neck supple.   Normal range of motion.  Cardiovascular:  Normal rate.           Pulmonary/Chest: Breath sounds normal.   Abdominal: Abdomen is soft. There is no abdominal tenderness.    Tiwari catheter in place.  Clear urine.  Was newly exchange just prior to arrival at [facility].   Musculoskeletal:         General: Normal range of motion.      Cervical back: Normal range of motion and neck supple.     Neurological: She is alert and oriented to person, place, and time. GCS score is 15. GCS eye subscore is 4. GCS verbal subscore is 5. GCS motor subscore is 6.   Skin: Skin is warm and dry.         ED Course   Critical Care    Date/Time: 6/25/2024 9:33 AM    Performed by: Aisha No PA-C  Authorized by: Zoë Roblero MD  Direct patient critical care time: 10 minutes  Additional history critical care time: 5 minutes  Ordering / reviewing critical care time: 5 minutes  Documentation critical care time: 3 minutes  Total critical care time (exclusive of procedural time) : 23 minutes  Critical care was necessary to treat or prevent imminent or life-threatening deterioration of the following conditions: sepsis.  Critical care was time  spent personally by me on the following activities: review of old charts, re-evaluation of patient's condition, pulse oximetry, ordering and review of radiographic studies, ordering and review of laboratory studies, ordering and performing treatments and interventions, examination of patient and evaluation of patient's response to treatment.        Labs Reviewed   CBC W/ AUTO DIFFERENTIAL - Abnormal; Notable for the following components:       Result Value    WBC 16.16 (*)     RBC 3.42 (*)     Hemoglobin 10.3 (*)     Hematocrit 32.3 (*)     MCHC 31.9 (*)     RDW 15.3 (*)     MPV 9.0 (*)     Immature Granulocytes 1.1 (*)     Gran # (ANC) 14.7 (*)     Immature Grans (Abs) 0.17 (*)     Lymph # 0.6 (*)     Gran % 91.0 (*)     Lymph % 3.9 (*)     Mono % 3.5 (*)     All other components within normal limits   COMPREHENSIVE METABOLIC PANEL - Abnormal; Notable for the following components:    Sodium 128 (*)     CO2 18 (*)     Glucose 154 (*)     BUN 30 (*)     Creatinine 3.3 (*)     Albumin 3.0 (*)     ALT 9 (*)     eGFR 14.4 (*)     All other components within normal limits   URINALYSIS, REFLEX TO URINE CULTURE - Abnormal; Notable for the following components:    Appearance, UA Hazy (*)     Protein, UA 2+ (*)     Glucose, UA Trace (*)     Occult Blood UA 2+ (*)     Leukocytes, UA 3+ (*)     All other components within normal limits    Narrative:     Specimen Source->Urine   URINALYSIS MICROSCOPIC - Abnormal; Notable for the following components:    WBC, UA >100 (*)     Bacteria Many (*)     All other components within normal limits    Narrative:     Specimen Source->Urine   POCT GLUCOSE - Abnormal; Notable for the following components:    POCT Glucose 251 (*)     All other components within normal limits   POCT GLUCOSE - Abnormal; Notable for the following components:    POCT Glucose 228 (*)     All other components within normal limits   ISTAT LACTATE        ECG Results              EKG 12-lead (Final result)         Collection Time Result Time QRS Duration OHS QTC Calculation    06/12/24 15:18:57 06/13/24 13:20:37 82 440                     Final result by Interface, Lab In [facility] (06/13/24 13:20:42)                   Narrative:    Test Reason : R00.0,    Vent. Rate : 103 BPM     Atrial Rate : 103 BPM     P-R Int : 194 ms          QRS Dur : 082 ms      QT Int : 336 ms       P-R-T Axes : 039 -01 073 degrees     QTc Int : 440 ms    Sinus tachycardia  Low anterior forces  Borderline Abnormal ECG  When compared with ECG of 17-AUG-2023 14:18,  No significant change was found  Confirmed by SIMON SAUNDERS MD ([id number]) on 6/13/2024 1:20:35 PM    Referred By: ELVA   SELF           Confirmed By:SIMON SAUNDERS MD                                  Imaging Results              X-Ray Chest AP Portable (Final result)  Result time 06/12/24 15:13:00      Final result by Crow Pena MD (06/12/24 15:13:00)                   Impression:      See above      Electronically signed by: Crow Pena MD  Date:    06/12/2024  Time:    15:13               Narrative:    EXAMINATION:  XR CHEST AP PORTABLE    CLINICAL HISTORY:  Sepsis;    TECHNIQUE:  Single frontal view of the chest was performed.    COMPARISON:  08/17/2023    FINDINGS:  Cardiac size is within normal limits.  Vascular catheters in place.  Lungs are clear with no infiltrate.                                       Medications   heparin (porcine) injection 1,000 Units (1,000 Units Intra-Catheter Given by Other 6/14/24 1422)   lactated ringers bolus 1,000 mL (0 mLs Intravenous Stopped 6/12/24 1337)   acetaminophen tablet 650 mg (650 mg Oral Given 6/12/24 1520)   vancomycin 2 g in dextrose 5 % 500 mL IVPB (0 mg Intravenous Stopped 6/12/24 1917)   piperacillin-tazobactam (ZOSYN) 4.5 g in dextrose 5 % in water (D5W) 100 mL IVPB (MB+) (0 g Intravenous Stopped 6/12/24 1631)   lactated ringers bolus 917 mL (0 mLs Intravenous Stopped 6/12/24 1917)   0.9%  NaCl infusion (0 mL/hr  Intravenous Stopped 6/14/24 1347)   0.9%  NaCl infusion (0 mL/hr Intravenous Stopped 6/15/24 1703)     Medical Decision Making   73-year-old female with chronic indwelling cath presents ED for concerns for UTI.  SNF noted cloudy urine in her Tiwari which was exchanged prior to arrival to the ED.      Differential includes but not limited to sepsis, UTI, pyelonephritis, pneumonia, electrolyte derangement, dehydration      UA positive for UTI.  She does have multiple comorbidities has a chronic indwelling catheter.  Will admit for IV fluids and antibiotics for urosepsis.  This patient does have evidence of infective focus  My overall impression is sepsis.  Source: Urinary Tract  Antibiotics given- Antibiotics (72h ago, onward)    Start     Stop Route Frequency Ordered    06/12/24 1400  vancomycin 2 g in dextrose 5 % 500 mL IVPB         06/13/24 0159 IV ED 1 Time 06/12/24 1352      Latest lactate reviewed-  Lab             06/12/24                       1237          POCLAC       0.86              Fluid challenge Ideal Body Weight- The patient's ideal body weight is Ideal body weight: 63.9 kg (140 lb 14 oz) which will be used to calculate fluid bolus of 30 ml/kg for treatment of septic shock.      Post- resuscitation assessment Yes Perfusion exam was performed within 6 hours of septic shock presentation after bolus shows Adequate tissue perfusion assessed by non-invasive monitoring       Will Not start Pressors- Levophed for MAP of 65  Source control achieved by:  vancomycin,  Zosyn      Amount and/or Complexity of Data Reviewed  Labs:  Decision-making details documented in ED Course.    Risk  OTC drugs.  Prescription drug management.  Decision regarding hospitalization.               ED Course as of 06/25/24 0933   Wed Jun 12, 2024   1350 WBC(!): 16.16  +Leukocytosis   [HJ]   1400 Creatinine(!): 3.3    Chronic CKD [HJ]      ED Course User Index  [HJ] Aisha No PA-C               Medical Decision Making:   Clinical  Tests:   Sepsis Perfusion Assessment: "I attest a sepsis perfusion exam was performed within 6 hours of sepsis, severe sepsis, or septic shock presentation, following fluid resuscitation."             Clinical Impression:  Final diagnoses:  [R00.0] Tachycardia  [A41.9] Sepsis, due to unspecified organism, unspecified whether acute organ dysfunction present (Primary)  [T83.511A, N39.0] Urinary tract infection associated with indwelling urethral catheter, initial encounter          ED Disposition Condition    Admit                 Aisha No PA-C  06/12/24 1635       Aisha No PA-C  06/12/24 1638       Aisha No PA-C  06/25/24 0933

## 2024-06-13 LAB
ALBUMIN SERPL BCP-MCNC: 2.3 G/DL (ref 3.5–5.2)
ALP SERPL-CCNC: 86 U/L (ref 55–135)
ALT SERPL W/O P-5'-P-CCNC: 7 U/L (ref 10–44)
ANION GAP SERPL CALC-SCNC: 18 MMOL/L (ref 8–16)
ANION GAP SERPL CALC-SCNC: 8 MMOL/L (ref 8–16)
AST SERPL-CCNC: 9 U/L (ref 10–40)
BACTERIA UR CULT: NORMAL
BACTERIA UR CULT: NORMAL
BASOPHILS # BLD AUTO: 0.02 K/UL (ref 0–0.2)
BASOPHILS NFR BLD: 0.2 % (ref 0–1.9)
BILIRUB SERPL-MCNC: 0.3 MG/DL (ref 0.1–1)
BUN SERPL-MCNC: 32 MG/DL (ref 8–23)
BUN SERPL-MCNC: 34 MG/DL (ref 8–23)
CALCIUM SERPL-MCNC: 7.9 MG/DL (ref 8.7–10.5)
CALCIUM SERPL-MCNC: 8.5 MG/DL (ref 8.7–10.5)
CHLORIDE SERPL-SCNC: 89 MMOL/L (ref 95–110)
CHLORIDE SERPL-SCNC: 99 MMOL/L (ref 95–110)
CO2 SERPL-SCNC: 17 MMOL/L (ref 23–29)
CO2 SERPL-SCNC: 18 MMOL/L (ref 23–29)
CREAT SERPL-MCNC: 2.9 MG/DL (ref 0.5–1.4)
CREAT SERPL-MCNC: 3.3 MG/DL (ref 0.5–1.4)
DIFFERENTIAL METHOD BLD: ABNORMAL
EOSINOPHIL # BLD AUTO: 0.2 K/UL (ref 0–0.5)
EOSINOPHIL NFR BLD: 1.5 % (ref 0–8)
ERYTHROCYTE [DISTWIDTH] IN BLOOD BY AUTOMATED COUNT: 15.3 % (ref 11.5–14.5)
EST. GFR  (NO RACE VARIABLE): 14.4 ML/MIN/1.73 M^2
EST. GFR  (NO RACE VARIABLE): 16.8 ML/MIN/1.73 M^2
GLUCOSE SERPL-MCNC: 109 MG/DL (ref 70–110)
GLUCOSE SERPL-MCNC: 397 MG/DL (ref 70–110)
HCT VFR BLD AUTO: 28.5 % (ref 37–48.5)
HGB BLD-MCNC: 9 G/DL (ref 12–16)
IMM GRANULOCYTES # BLD AUTO: 0.05 K/UL (ref 0–0.04)
IMM GRANULOCYTES NFR BLD AUTO: 0.5 % (ref 0–0.5)
LYMPHOCYTES # BLD AUTO: 1.3 K/UL (ref 1–4.8)
LYMPHOCYTES NFR BLD: 12.3 % (ref 18–48)
MAGNESIUM SERPL-MCNC: 1.6 MG/DL (ref 1.6–2.6)
MCH RBC QN AUTO: 29.9 PG (ref 27–31)
MCHC RBC AUTO-ENTMCNC: 31.6 G/DL (ref 32–36)
MCV RBC AUTO: 95 FL (ref 82–98)
MONOCYTES # BLD AUTO: 0.8 K/UL (ref 0.3–1)
MONOCYTES NFR BLD: 7.5 % (ref 4–15)
NEUTROPHILS # BLD AUTO: 8.5 K/UL (ref 1.8–7.7)
NEUTROPHILS NFR BLD: 78 % (ref 38–73)
NRBC BLD-RTO: 0 /100 WBC
OHS QRS DURATION: 82 MS
OHS QTC CALCULATION: 440 MS
OSMOLALITY SERPL: 272 MOSM/KG (ref 275–295)
OSMOLALITY UR: 186 MOSM/KG (ref 50–1200)
PHOSPHATE SERPL-MCNC: 3.3 MG/DL (ref 2.7–4.5)
PLATELET # BLD AUTO: 258 K/UL (ref 150–450)
PMV BLD AUTO: 9.1 FL (ref 9.2–12.9)
POCT GLUCOSE: 117 MG/DL (ref 70–110)
POCT GLUCOSE: 141 MG/DL (ref 70–110)
POCT GLUCOSE: 144 MG/DL (ref 70–110)
POTASSIUM SERPL-SCNC: 3.5 MMOL/L (ref 3.5–5.1)
POTASSIUM SERPL-SCNC: 3.7 MMOL/L (ref 3.5–5.1)
PROT SERPL-MCNC: 5.6 G/DL (ref 6–8.4)
RBC # BLD AUTO: 3.01 M/UL (ref 4–5.4)
SODIUM SERPL-SCNC: 124 MMOL/L (ref 136–145)
SODIUM SERPL-SCNC: 125 MMOL/L (ref 136–145)
TSH SERPL DL<=0.005 MIU/L-ACNC: 1.11 UIU/ML (ref 0.4–4)
WBC # BLD AUTO: 10.85 K/UL (ref 3.9–12.7)

## 2024-06-13 PROCEDURE — 25000003 PHARM REV CODE 250: Mod: HCNC | Performed by: STUDENT IN AN ORGANIZED HEALTH CARE EDUCATION/TRAINING PROGRAM

## 2024-06-13 PROCEDURE — 85025 COMPLETE CBC W/AUTO DIFF WBC: CPT | Mod: HCNC | Performed by: STUDENT IN AN ORGANIZED HEALTH CARE EDUCATION/TRAINING PROGRAM

## 2024-06-13 PROCEDURE — 84443 ASSAY THYROID STIM HORMONE: CPT | Mod: HCNC | Performed by: STUDENT IN AN ORGANIZED HEALTH CARE EDUCATION/TRAINING PROGRAM

## 2024-06-13 PROCEDURE — 80053 COMPREHEN METABOLIC PANEL: CPT | Mod: HCNC | Performed by: STUDENT IN AN ORGANIZED HEALTH CARE EDUCATION/TRAINING PROGRAM

## 2024-06-13 PROCEDURE — 83735 ASSAY OF MAGNESIUM: CPT | Mod: HCNC | Performed by: STUDENT IN AN ORGANIZED HEALTH CARE EDUCATION/TRAINING PROGRAM

## 2024-06-13 PROCEDURE — 36415 COLL VENOUS BLD VENIPUNCTURE: CPT | Mod: HCNC | Performed by: STUDENT IN AN ORGANIZED HEALTH CARE EDUCATION/TRAINING PROGRAM

## 2024-06-13 PROCEDURE — 84100 ASSAY OF PHOSPHORUS: CPT | Mod: HCNC | Performed by: STUDENT IN AN ORGANIZED HEALTH CARE EDUCATION/TRAINING PROGRAM

## 2024-06-13 PROCEDURE — 83930 ASSAY OF BLOOD OSMOLALITY: CPT | Mod: HCNC | Performed by: STUDENT IN AN ORGANIZED HEALTH CARE EDUCATION/TRAINING PROGRAM

## 2024-06-13 PROCEDURE — 25000003 PHARM REV CODE 250: Mod: HCNC

## 2024-06-13 PROCEDURE — 20600001 HC STEP DOWN PRIVATE ROOM: Mod: HCNC

## 2024-06-13 PROCEDURE — 80048 BASIC METABOLIC PNL TOTAL CA: CPT | Mod: HCNC,XB

## 2024-06-13 PROCEDURE — 36415 COLL VENOUS BLD VENIPUNCTURE: CPT | Mod: HCNC

## 2024-06-13 PROCEDURE — 63600175 PHARM REV CODE 636 W HCPCS: Mod: HCNC | Performed by: STUDENT IN AN ORGANIZED HEALTH CARE EDUCATION/TRAINING PROGRAM

## 2024-06-13 PROCEDURE — 99222 1ST HOSP IP/OBS MODERATE 55: CPT | Mod: HCNC,,, | Performed by: NURSE PRACTITIONER

## 2024-06-13 RX ORDER — SODIUM BICARBONATE 650 MG/1
650 TABLET ORAL DAILY
Status: DISCONTINUED | OUTPATIENT
Start: 2024-06-13 | End: 2024-06-15 | Stop reason: HOSPADM

## 2024-06-13 RX ORDER — POLYETHYLENE GLYCOL 3350 17 G/17G
17 POWDER, FOR SOLUTION ORAL DAILY
Qty: 1530 G | Refills: 0 | Status: SHIPPED | OUTPATIENT
Start: 2024-06-14 | End: 2024-06-15 | Stop reason: HOSPADM

## 2024-06-13 RX ORDER — MICONAZOLE NITRATE 2 %
POWDER (GRAM) TOPICAL 2 TIMES DAILY
Status: DISCONTINUED | OUTPATIENT
Start: 2024-06-13 | End: 2024-06-15 | Stop reason: HOSPADM

## 2024-06-13 RX ORDER — SODIUM BICARBONATE 650 MG/1
650 TABLET ORAL DAILY
Qty: 30 TABLET | Refills: 11 | Status: SHIPPED | OUTPATIENT
Start: 2024-06-14 | End: 2025-06-14

## 2024-06-13 RX ORDER — SODIUM CHLORIDE 9 MG/ML
INJECTION, SOLUTION INTRAVENOUS ONCE
Status: COMPLETED | OUTPATIENT
Start: 2024-06-14 | End: 2024-06-14

## 2024-06-13 RX ORDER — POLYETHYLENE GLYCOL 3350 17 G/17G
17 POWDER, FOR SOLUTION ORAL DAILY
Status: DISCONTINUED | OUTPATIENT
Start: 2024-06-13 | End: 2024-06-15 | Stop reason: HOSPADM

## 2024-06-13 RX ORDER — MICONAZOLE NITRATE 2 %
POWDER (GRAM) TOPICAL 2 TIMES DAILY
Qty: 85 G | Refills: 0 | Status: SHIPPED | OUTPATIENT
Start: 2024-06-13 | End: 2024-08-12

## 2024-06-13 RX ADMIN — POLYETHYLENE GLYCOL 3350 17 G: 17 POWDER, FOR SOLUTION ORAL at 12:06

## 2024-06-13 RX ADMIN — CALCIUM ACETATE 667 MG: 667 CAPSULE ORAL at 12:06

## 2024-06-13 RX ADMIN — HYDROCODONE BITARTRATE AND ACETAMINOPHEN 1 TABLET: 10; 325 TABLET ORAL at 05:06

## 2024-06-13 RX ADMIN — METHOCARBAMOL 500 MG: 500 TABLET ORAL at 08:06

## 2024-06-13 RX ADMIN — CARVEDILOL 6.25 MG: 6.25 TABLET, FILM COATED ORAL at 08:06

## 2024-06-13 RX ADMIN — HYDROCODONE BITARTRATE AND ACETAMINOPHEN 1 TABLET: 10; 325 TABLET ORAL at 11:06

## 2024-06-13 RX ADMIN — ANASTROZOLE 1 MG: 1 TABLET, COATED ORAL at 08:06

## 2024-06-13 RX ADMIN — CALCIUM ACETATE 667 MG: 667 CAPSULE ORAL at 08:06

## 2024-06-13 RX ADMIN — APIXABAN 5 MG: 5 TABLET, FILM COATED ORAL at 05:06

## 2024-06-13 RX ADMIN — AMLODIPINE BESYLATE 10 MG: 10 TABLET ORAL at 08:06

## 2024-06-13 RX ADMIN — OXYBUTYNIN CHLORIDE 10 MG: 5 TABLET, EXTENDED RELEASE ORAL at 08:06

## 2024-06-13 RX ADMIN — SODIUM BICARBONATE 650 MG TABLET 650 MG: at 10:06

## 2024-06-13 RX ADMIN — PIPERACILLIN SODIUM AND TAZOBACTAM SODIUM 4.5 G: 4; .5 INJECTION, POWDER, FOR SOLUTION INTRAVENOUS at 03:06

## 2024-06-13 RX ADMIN — DULOXETINE HYDROCHLORIDE 40 MG: 20 CAPSULE, DELAYED RELEASE ORAL at 08:06

## 2024-06-13 RX ADMIN — FUROSEMIDE 40 MG: 40 TABLET ORAL at 08:06

## 2024-06-13 RX ADMIN — METHOCARBAMOL 500 MG: 500 TABLET ORAL at 12:06

## 2024-06-13 RX ADMIN — TRAZODONE HYDROCHLORIDE 100 MG: 50 TABLET ORAL at 08:06

## 2024-06-13 RX ADMIN — MICONAZOLE NITRATE 2 % TOPICAL POWDER: at 08:06

## 2024-06-13 RX ADMIN — METHOCARBAMOL 500 MG: 500 TABLET ORAL at 05:06

## 2024-06-13 RX ADMIN — LOSARTAN POTASSIUM 100 MG: 50 TABLET, FILM COATED ORAL at 08:06

## 2024-06-13 RX ADMIN — LEVOTHYROXINE SODIUM 50 MCG: 50 TABLET ORAL at 06:06

## 2024-06-13 RX ADMIN — INSULIN GLARGINE 10 UNITS: 100 INJECTION, SOLUTION SUBCUTANEOUS at 08:06

## 2024-06-13 RX ADMIN — CALCIUM ACETATE 667 MG: 667 CAPSULE ORAL at 05:06

## 2024-06-13 RX ADMIN — MICONAZOLE NITRATE 2 % TOPICAL POWDER: at 03:06

## 2024-06-13 RX ADMIN — ATORVASTATIN CALCIUM 80 MG: 20 TABLET, FILM COATED ORAL at 08:06

## 2024-06-13 NOTE — H&P
[facility] - Emergency Dept  [facility] Medicine  History & Physical    Patient Name: Cecile Bowen  MRN: [id number]  Patient Class: IP- Inpatient  Admission Date: 6/12/2024  Attending Physician: Nallely Newby MD   Primary Care Provider: Lurdes Navarro MD         Patient information was obtained from patient and ER records.     Subjective:     Principal Problem:Sepsis    Chief Complaint:   Chief Complaint   Patient presents with    Fever     T max 101.5F, HH nurse went to change her murillo today and urine is cloudy        HPI: 73-year-old female with past medical history of  ESRD on HD M/F, hypertension, hyperlipidemia, ARON, breast CA, chronic indwelling urinary catheter who presents ED for concern for UTI. HH nurse was seeing patient and concerned for cloudy urine, murillo exchanged today. Patient reports she had no symptoms at home, denies fever, abd pain, N/V. Does have chronic back pain.    ED reports patient from SNF, however patient confirmed she was at home being seen by HH.   Febrile in the ED, UA appearing infectious, Cultures drawn and started on broad spectrum abx.     Past Medical History:   Diagnosis Date    Cervicogenic migraine     Chronic pain     CKD (chronic kidney disease) stage 4, GFR 15-29 ml/min     [facility]    CKD (chronic kidney disease) stage 4, GFR 15-29 ml/min     Diabetes mellitus     Long term use of Insulin, Diabetic Neuropathy    Dialysis patient 1/21/2022    Fibromyalgia     Hydronephrosis     Hyperlipidemia     Hypertension 12/12/2012    Hypothyroidism 12/12/2012    ARON (iron deficiency anemia)     Insomnia     Levoscoliosis     Malignant neoplasm of upper-outer quadrant of left breast in female, estrogen receptor positive     Metabolic acidosis     Mobility impaired     Nuclear sclerosis - Both Eyes 3/24/2014    VIJAYA (obstructive sleep apnea)     Osteopenia     Pulmonary nodule     Recurrent UTI     Renal manifestation of secondary diabetes mellitus     Secondary  hyperparathyroidism, renal     Urinary retention        Past Surgical History:   Procedure Laterality Date    AV FISTULA PLACEMENT Left 2/14/2024    Procedure: CREATION, AV FISTULA;  Surgeon: RODRIGO Friedman III, MD;  Location: [facility] OR 2ND FLR;  Service: Vascular;  Laterality: Left;  LUE AVF creation vs AVG insertion    BIOPSY OF URETER Left 2/18/2022    Procedure: BIOPSY, URETER;  Surgeon: Ronel Whittington MD;  Location: [facility] OR 1ST FLR;  Service: Urology;  Laterality: Left;    BREAST BIOPSY Right     benign    CHOLECYSTECTOMY      COLONOSCOPY N/A 1/13/2017    Procedure: COLONOSCOPY;  Surgeon: Morris Wiseman MD;  Location: [facility] ENDO (4TH FLR);  Service: Endoscopy;  Laterality: N/A;  Renal pt Nephrology advised to avoid phosphate preps    COLONOSCOPY N/A 12/7/2023    Procedure: COLONOSCOPY;  Surgeon: Herve Allen MD;  Location: [facility] ENDO (2ND FLR);  Service: Endoscopy;  Laterality: N/A;  HD MWF; labs prior  suprapubic catheter  pt does not ambulate-uses christina lift- will have sling with her  9/7 ref. by Anastasiia Campbell DO, PEG, instr. mailed, Eliquis hold approval pending-[facility] to hold Eliquis 2 days per Dr Navarro-GT  1/30-precall complete-MS    CYSTOSCOPY N/A 10/8/2018    Procedure: CYSTOSCOPY;  Surgeon: Ronel Whittington MD;  [facility];  Service: Urology;  Laterality: N/A;  45 min    CYSTOSCOPY N/A 3/25/2019    Procedure: CYSTOSCOPY;  Surgeon: Ronel Whittington MD;  [facility];  Service: Urology;  Laterality: N/A;  45 min    CYSTOSCOPY N/A 8/26/2019    Procedure: CYSTOSCOPY;  Surgeon: Ronel Whittington MD;  [facility];  Service: Urology;  Laterality: N/A;  45 min    CYSTOSCOPY N/A 7/2/2021    Procedure: CYSTOSCOPY;  Surgeon: Ronel Whittington MD;  [facility];  Service: Urology;  Laterality: N/A;    CYSTOSCOPY  12/23/2021    Procedure: CYSTOSCOPY;  Surgeon: Ronel Whittington MD;  Location: [facility] OR [address] [place] [address];   Service: Urology;;    CYSTOSCOPY  2/18/2022    Procedure: CYSTOSCOPY;  Surgeon: Ronel Whittington MD;  Location: [facility] OR [address] [place] [address];  Service: Urology;;    CYSTOSCOPY W/ URETERAL STENT PLACEMENT Left 5/15/2021    Procedure: CYSTOSCOPY, WITH URETERAL STENT INSERTION;  Surgeon: Levy Sánchez Jr., MD;  Location: [facility] OR [address] [place] [address];  Service: Urology;  Laterality: Left;    CYSTOSCOPY WITH BIOPSY OF BLADDER N/A 1/27/2020    Procedure: CYSTOSCOPY, WITH BLADDER BIOPSY, WITH FULGURATION IF INDICATED;  Surgeon: Ronel Whittington MD;  Location: [facility] OR [address] [place] [address];  Service: Urology;  Laterality: N/A;    DILATION AND CURETTAGE OF UTERUS      FISTULOGRAM N/A 4/29/2024    Procedure: Fistulogram;  Surgeon: RODRIGO Friedman III, MD;  Location: [facility] CATH LAB;  Service: Peripheral Vascular;  Laterality: N/A;    GALLBLADDER SURGERY  2006    HYSTERECTOMY      INJECTION FOR SENTINEL NODE IDENTIFICATION Left 8/1/2019    Procedure: INJECTION, FOR SENTINEL NODE IDENTIFICATION;  Surgeon: Samia Fulton MD;  Location: [facility] OR [address] [place] [address];  Service: General;  Laterality: Left;    INJECTION OF BOTULINUM TOXIN TYPE A N/A 10/8/2018    Procedure: INJECTION, BOTULINUM TOXIN, TYPE A 300 UNITS;  Surgeon: Ronel Whittington MD;  Location: [facility] OR [address] [place] [address];  Service: Urology;  Laterality: N/A;    INJECTION OF BOTULINUM TOXIN TYPE A N/A 3/25/2019    Procedure: INJECTION, BOTULINUM TOXIN, TYPE A 300 UNITS;  Surgeon: Ronel Whittington MD;  Location: [facility] OR [address] [place] [address];  Service: Urology;  Laterality: N/A;    INJECTION OF BOTULINUM TOXIN TYPE A N/A 8/26/2019    Procedure: INJECTION, BOTULINUM TOXIN, TYPE A 300 UNITS;  Surgeon: oRnel Whittington MD;  Location: [facility] OR [address] [place] [address];  Service: Urology;  Laterality: N/A;    MASTECTOMY Left 8/1/2019    Procedure: MASTECTOMY 23 hour stay;  Surgeon: Samia Fulton MD;  Location: [facility] OR [address] [place] [address];  Service: General;  Laterality: Left;    MEDIPORT REMOVAL Right 6/24/2022    Procedure: REMOVAL, CATHETER,  CENTRAL VENOUS, TUNNELED, WITH PORT;  Surgeon: Isauro Anderson MD;  Location: [facility] CATH LAB;  Service: Radiology;  Laterality: Right;    OVARIAN CYST SURGERY  1985    PERCUTANEOUS TRANSLUMINAL ANGIOPLASTY OF ARTERIOVENOUS FISTULA Left 4/29/2024    Procedure: PTA, AV FISTULA;  Surgeon: RODRIGO Friedman III, MD;  Location: [facility] CATH LAB;  Service: Peripheral Vascular;  Laterality: Left;    REPLACEMENT OF STENT Left 12/23/2021    Procedure: REPLACEMENT, STENT;  Surgeon: Ronel Whittington MD;  Location: [facility] OR [facility] FLR;  Service: Urology;  Laterality: Left;    REPLACEMENT OF STENT Left 2/18/2022    Procedure: REPLACEMENT, STENT;  Surgeon: Ronel Whittington MD;  Location: [facility] OR [facility] FLR;  Service: Urology;  Laterality: Left;    RETROGRADE PYELOGRAPHY Left 2/18/2022    Procedure: PYELOGRAM, RETROGRADE;  Surgeon: Ronel Whittington MD;  [facility];  Service: Urology;  Laterality: Left;    SENTINEL LYMPH NODE BIOPSY Left 8/1/2019    Procedure: BIOPSY, LYMPH NODE, SENTINEL;  Surgeon: Samia Fulton MD;  Location: [facility] OR 2ND FLR;  Service: General;  Laterality: Left;    spt placement      TONSILLECTOMY, ADENOIDECTOMY      TOTAL ABDOMINAL HYSTERECTOMY W/ BILATERAL SALPINGOOPHORECTOMY  1985    URETEROSCOPY Left 2/18/2022    Procedure: URETEROSCOPY;  Surgeon: Ronel Whittington MD;  [facility];  Service: Urology;  Laterality: Left;       Review of patient's allergies indicates:  No Known Allergies    Current Facility-Administered Medications on File Prior to Encounter   Medication    epoetin chloe injection 2,000 Units    heparin (porcine) injection 2,000 Units    heparin (porcine) injection 2,000 Units    heparin (porcine) injection 4,000 Units    iron sucrose injection 100 mg     Current Outpatient Medications on File Prior to Encounter   Medication Sig    amLODIPine (NORVASC) 10 MG tablet Take 10 mg by mouth.    anastrozole (ARIMIDEX) 1 mg Tab TAKE 1 TABLET BY MOUTH EVERY  "DAY    apixaban (ELIQUIS) 5 mg Tab Take 1 tablet (5 mg total) by mouth Daily.    atorvastatin (LIPITOR) 80 MG tablet Take 1 tablet (80 mg total) by mouth once daily.    BD INSULIN SYRINGE ULTRA-FINE 0.5 mL 31 gauge x 5/16" Syrg USE WITH INSULIN 4 TIMES A DAY    calcium acetate,phosphat bind, (PHOSLO) 667 mg capsule Take 667 mg by mouth 3 (three) times daily with meals.    carvediloL (COREG) 6.25 MG tablet Take 1 tablet (6.25 mg total) by mouth 2 (two) times daily.    ceFAZolin 2 gram SolR     DIPRIVAN 10 mg/mL infusion     DULoxetine (CYMBALTA) 20 MG capsule Take 2 capsules (40 mg total) by mouth once daily.    erenumab-aooe (AIMOVIG AUTOINJECTOR) 140 mg/mL autoinjector 140 mg MONTHLY (route: subcutaneous)    ergocalciferol (ERGOCALCIFEROL) 50,000 unit Cap Take 1 capsule (50,000 Units total) by mouth every 7 days.    fentaNYL (SUBLIMAZE) 50 mcg/mL injection     furosemide (LASIX) 40 MG tablet Take 1 tablet (40 mg total) by mouth once daily.    heparin sodium,porcine (HEPARIN, PORCINE,) 1,000 unit/mL injection     HYDROcodone-acetaminophen (NORCO)  mg per tablet Take 1 tablet by mouth every 6 (six) hours as needed for Pain.    LANTUS SOLOSTAR U-100 INSULIN 100 unit/mL (3 mL) InPn pen INJECT 12-15 UNITS UNDER THE SKIN at night    losartan (COZAAR) 100 MG tablet Take 1 tablet (100 mg total) by mouth once daily.    losartan (COZAAR) 50 MG tablet Take 50 mg by mouth.    methocarbamoL (ROBAXIN) 750 MG Tab TAKE 1-2 TABLETS (750-1,500 MG TOTAL) BY MOUTH 3 (THREE) TIMES DAILY AS NEEDED (MUSCLE SPASMS).    midazolam (VERSED) 1 mg/mL injection     ondansetron (ZOFRAN-ODT) 8 MG TbDL 8 mg EVERY 12 HOURS (route: oral)    oxybutynin (DITROPAN-XL) 10 MG 24 hr tablet TAKE 1 TABLET BY MOUTH EVERY DAY    oxyCODONE (ROXICODONE) 5 MG immediate release tablet Take 1 tablet (5 mg total) by mouth daily as needed (severe pain).    pen needle, diabetic (BD ULTRA-FINE SHORT PEN NEEDLE) 31 gauge x 5/16" Ndle USE WITH LANTUS DAILY, e " 11.65    PHENYLephrine 10 mg/mL injection     POLOCAINE-MPF 15 mg/mL (1.5 %) injection     sumatriptan (IMITREX) 100 MG tablet Take 1 tablet (100 mg total) by mouth 2 (two) times daily as needed for Migraine (Do not take more than 2 in 24 hours or 3 in a week).    SYNTHROID 50 mcg tablet TAKE 1 TABLET BY MOUTH BEFORE BREAKFAST. ADMINISTER ON AN EMPTY STOMACH AT LEAST 30 MINUTES BEFORE FOOD. IF RECEIVING TUBE FEEDS, HOLD TUBE FEEDS FOR 1 HOUR BEFORE AND AFTER LEVOTHYROXINE ADMINISTRATION.    tenapanor 30 mg Tab Take 30 mg by mouth 2 (two) times a day. Take 1 tablet in the morning with meal and 1 tablet in the afternoon with meal    traZODone (DESYREL) 100 MG tablet TAKE 1 TABLET BY MOUTH NIGHTLY AS NEEDED FOR INSOMNIA.    VELPHORO 500 mg Chew CHEW INSERT TABLET 5 TIMES DAILY WITH MEAL AND SNACK     Family History       Problem Relation (Age of Onset)    ALS Mother    Cancer Maternal Grandmother, Paternal Grandfather, Brother    Diabetes Sister, Maternal Aunt, Maternal Uncle    Kidney disease Sister, Maternal Aunt    Prostate cancer Brother    Scoliosis Brother          Tobacco Use    Smoking status: Never    Smokeless tobacco: Never   Substance and Sexual Activity    Alcohol use: No     Alcohol/week: 0.0 standard drinks of alcohol    Drug use: No    Sexual activity: Not Currently     Birth control/protection: Abstinence     Review of Systems   Constitutional:  Positive for fever. Negative for chills.   HENT:  Negative for trouble swallowing.    Respiratory:  Negative for shortness of breath.    Cardiovascular:  Positive for leg swelling. Negative for chest pain.   Gastrointestinal:  Negative for abdominal pain, nausea and vomiting.   Genitourinary:  Negative for dysuria and flank pain.   Musculoskeletal:  Positive for back pain.   Skin:  Negative for wound.   Neurological:  Negative for seizures.   Psychiatric/Behavioral:  Negative for confusion.      Objective:     Vital Signs (Most Recent):  Temp: 98.2 °F (36.8 °C)  (06/12/24 1534)  Pulse: 98 (06/12/24 1534)  Resp: 16 (06/12/24 1534)  BP: (!) 174/78 (06/12/24 1534)  SpO2: 95 % (06/12/24 1534) Vital Signs (24h Range):  Temp:  [98.2 °F (36.8 °C)-101.5 °F (38.6 °C)] 98.2 °F (36.8 °C)  Pulse:  [] 98  Resp:  [16-24] 16  SpO2:  [95 %-96 %] 95 %  BP: (174-182)/(78-84) 174/78     Weight: 99.8 kg (220 lb)  Body mass index is 33.45 kg/m².     Physical Exam  Vitals and nursing note reviewed.   Constitutional:       General: She is not in acute distress.     Appearance: Normal appearance.   HENT:      Head: Normocephalic and atraumatic.      Nose: Nose normal.      Mouth/Throat:      Mouth: Mucous membranes are moist.      Pharynx: Oropharynx is clear.   Eyes:      Extraocular Movements: Extraocular movements intact.      Conjunctiva/sclera: Conjunctivae normal.      Pupils: Pupils are equal, round, and reactive to light.   Cardiovascular:      Rate and Rhythm: Normal rate and regular rhythm.      Pulses: Normal pulses.      Heart sounds: Normal heart sounds.      Comments: RUE fistula in place with +thrill  Tunneled HD cath to left chest  Pulmonary:      Effort: Pulmonary effort is normal. No respiratory distress.      Breath sounds: Normal breath sounds. No wheezing.   Abdominal:      General: Abdomen is flat. Bowel sounds are normal. There is no distension.      Palpations: Abdomen is soft.      Tenderness: There is no abdominal tenderness.      Comments: Chronic indwelling cath in place, foul smelling, urine clear   Musculoskeletal:         General: Normal range of motion.      Cervical back: Normal range of motion and neck supple.      Right lower leg: Edema present.      Left lower leg: Edema (L>R, chronic) present.   Skin:     General: Skin is warm and dry.   Neurological:      General: No focal deficit present.      Mental Status: She is alert and oriented to person, place, and time.   Psychiatric:         Mood and Affect: Mood normal.         Behavior: Behavior normal.               CRANIAL NERVES     CN III, IV, VI   Pupils are equal, round, and reactive to light.       Significant Labs: All pertinent labs within the past 24 hours have been reviewed.    Significant Imaging: I have reviewed all pertinent imaging results/findings within the past 24 hours.  Assessment/Plan:     * Sepsis  This patient does have evidence of infective focus  My overall impression is sepsis.  Source: Urinary Tract  Antibiotics given-   Antibiotics (72h ago, onward)      Start     Stop Route Frequency Ordered    06/13/24 0400  piperacillin-tazobactam (ZOSYN) 4.5 g in dextrose 5 % in water (D5W) 100 mL IVPB (MB+)         -- IV Every 12 hours (non-standard times) 06/12/24 1913          Latest lactate reviewed-  Recent Labs   Lab 06/12/24  1237   POCLAC 0.86     Organ dysfunction indicated by Acute kidney injury    Fluid challenge Not needed - patient is not hypotensive      Will Not start Pressors- Levophed for MAP of 65  Source control achieved by: abx    Bcx and Ucx in process, UA appears infectious   - prior Ucx pansensitive pseudomonas- cont zosyn pending cultures     Type 2 diabetes mellitus with kidney complication, with long-term current use of insulin  Patient's FSGs are uncontrolled due to hyperglycemia on current medication regimen.  Last A1c reviewed-   Lab Results   Component Value Date    HGBA1C 6.2 (H) 04/22/2024     Most recent fingerstick glucose reviewed-   Recent Labs   Lab 06/12/24  1922   POCTGLUCOSE 251*     Current correctional scale  Low  Maintain anti-hyperglycemic dose as follows-   Antihyperglycemics (From admission, onward)      Start     Stop Route Frequency Ordered    06/12/24 2100  insulin glargine U-100 (Lantus) pen 10 Units         -- SubQ Nightly 06/1950 06/12/24 1846  insulin aspart U-100 pen 0-5 Units         -- SubQ Before meals & nightly PRN 06/12/24 1747          Hold Oral hypoglycemics while patient is in the hospital.    HTN (hypertension)  Chronic,  controlled. Latest blood pressure and vitals reviewed-     Temp:  [98.2 °F (36.8 °C)-101.5 °F (38.6 °C)]   Pulse:  []   Resp:  [16-24]   BP: (174-182)/(78-84)   SpO2:  [95 %-96 %] .   Home meds for hypertension were reviewed and noted below.   Hypertension Medications               amLODIPine (NORVASC) 10 MG tablet Take 10 mg by mouth.    carvediloL (COREG) 6.25 MG tablet Take 1 tablet (6.25 mg total) by mouth 2 (two) times daily.    furosemide (LASIX) 40 MG tablet Take 1 tablet (40 mg total) by mouth once daily.    losartan (COZAAR) 100 MG tablet Take 1 tablet (100 mg total) by mouth once daily.                 While in the hospital, will manage blood pressure as follows; Continue home antihypertensive regimen    Will utilize p.r.n. blood pressure medication only if patient's blood pressure greater than 180/110 and she develops symptoms such as worsening chest pain or shortness of breath.  - PRN hydralazine and volume removal with HD    ESRD (end stage renal disease) on dialysis  Creatine stable for now. BMP reviewed- noted Estimated Creatinine Clearance: 19.3 mL/min (A) (based on SCr of 3.3 mg/dL (H)). according to latest data. Based on current GFR, CKD stage is end stage.  Monitor UOP and serial BMP and adjust therapy as needed. Renally dose meds. Avoid nephrotoxic medications and procedures.    - ESRD on HD M/F  - renal consulted for HD  - does have RUE fistula, but currently dialyzing through tunneled HD cath      Unspecified atrial fibrillation  Patient with Paroxysmal (<7 days) atrial fibrillation which is controlled currently with Beta Blocker. Patient is currently in sinus rhythm.[id number] Score: 3.  Anticoagulation indicated. Anticoagulation done with eliquis .    - on chart review and per patient report she is only on 5mg daily due to easy bruising    Malignant neoplasm of left breast, estrogen receptor positive  - home med anastrozole  - f/u HO      Class 1 obesity with serious comorbidity and  body mass index (BMI) of 33.0 to 33.9 in adult  Body mass index is 33.45 kg/m². Morbid obesity complicates all aspects of disease management from diagnostic modalities to treatment. Weight loss encouraged and health benefits explained to patient.         Neurogenic bladder  - cont home med oxybutynin  - indwelling cath in place, changed day of admission 6/12       Iron deficiency anemia, unspecified  Patient's anemia is currently controlled. Has not received any PRBCs to date. Etiology likely d/t chronic disease due to ESRD  Current CBC reviewed-   Lab Results   Component Value Date    HGB 10.3 (L) 06/12/2024    HCT 32.3 (L) 06/12/2024     Monitor serial CBC and transfuse if patient becomes hemodynamically unstable, symptomatic or H/H drops below 7/21.    Recurrent urinary tract infection  - history of recurrent UTI with indwelling cath in place, changed 6/12  - prior Ucx pansensitive Pseudomonas   - Ucx in process, cont zosyn for now      Hyponatremia  Patient has hyponatremia which is uncontrolled,We will aim to correct the sodium by 4-6mEq in 24 hours. We will monitor sodium Daily. The hyponatremia is due to renal insufficiency. We will obtain the following studies: Urine sodium, urine osmolality, serum osmolality or TSH, T4. We will treat the hyponatremia with Hemodialysis. The patient's sodium results have been reviewed and are listed below.  Recent Labs   Lab 06/12/24  1235   *       Hyperlipidemia associated with type 2 diabetes mellitus  - cont statin    Hypothyroidism  - cont synthroid        VTE Risk Mitigation (From admission, onward)           Ordered     apixaban tablet 5 mg  Daily         06/1952                               Pharmacist Renal Dose Adjustment Note    Cecile Bowen is a 73 y.o. female being treated with the medication Zosyn.    Patient Data:    Vital Signs (Most Recent):  Temp: 98.2 °F (36.8 °C) (06/12/24 1534)  Pulse: 98 (06/12/24 1534)  Resp: 16 (06/12/24 1534)  BP: (!)  174/78 (06/12/24 1534)  SpO2: 95 % (06/12/24 1534) Vital Signs (72h Range):  Temp:  [98.2 °F (36.8 °C)-101.5 °F (38.6 °C)]   Pulse:  []   Resp:  [16-24]   BP: (170-182)/(73-84)   SpO2:  [95 %-96 %]      Recent Labs   Lab 06/07/24  0000 06/12/24  1235   CREATININE 3.04* 3.3*     Serum creatinine: 3.3 mg/dL (H) 06/12/24 1235  Estimated creatinine clearance: 19.3 mL/min (A)    Zosyn 4.5 g IV Q8H decreased frequency to 4.5 g IV Q12H given ESRD on dialysis Mondays and Fridays.  Will continue to monitor and adjust dose as needed.    Pharmacist's Name: Liban HuffD  Pharmacist's Extension: [phone]      Nallely Newby MD  Department of Hospital Medicine  [facility] - Emergency Dept

## 2024-06-13 NOTE — ASSESSMENT & PLAN NOTE
Chronic, controlled. Latest blood pressure and vitals reviewed-     Temp:  [98.2 °F (36.8 °C)-101.5 °F (38.6 °C)]   Pulse:  []   Resp:  [16-24]   BP: (174-182)/(78-84)   SpO2:  [95 %-96 %] .   Home meds for hypertension were reviewed and noted below.   Hypertension Medications               amLODIPine (NORVASC) 10 MG tablet Take 10 mg by mouth.    carvediloL (COREG) 6.25 MG tablet Take 1 tablet (6.25 mg total) by mouth 2 (two) times daily.    furosemide (LASIX) 40 MG tablet Take 1 tablet (40 mg total) by mouth once daily.    losartan (COZAAR) 100 MG tablet Take 1 tablet (100 mg total) by mouth once daily.                 While in the hospital, will manage blood pressure as follows; Continue home antihypertensive regimen    Will utilize p.r.n. blood pressure medication only if patient's blood pressure greater than 180/110 and she develops symptoms such as worsening chest pain or shortness of breath.  - PRN hydralazine and volume removal with HD

## 2024-06-13 NOTE — SUBJECTIVE & OBJECTIVE
Past Medical History:   Diagnosis Date    Cervicogenic migraine     Chronic pain     CKD (chronic kidney disease) stage 4, GFR 15-29 ml/min     [facility]    CKD (chronic kidney disease) stage 4, GFR 15-29 ml/min     Diabetes mellitus     Long term use of Insulin, Diabetic Neuropathy    Dialysis patient 1/21/2022    Fibromyalgia     Hydronephrosis     Hyperlipidemia     Hypertension 12/12/2012    Hypothyroidism 12/12/2012    ARON (iron deficiency anemia)     Insomnia     Levoscoliosis     Malignant neoplasm of upper-outer quadrant of left breast in female, estrogen receptor positive     Metabolic acidosis     Mobility impaired     Nuclear sclerosis - Both Eyes 3/24/2014    VIJAYA (obstructive sleep apnea)     Osteopenia     Pulmonary nodule     Recurrent UTI     Renal manifestation of secondary diabetes mellitus     Secondary hyperparathyroidism, renal     Urinary retention        Past Surgical History:   Procedure Laterality Date    AV FISTULA PLACEMENT Left 2/14/2024    Procedure: CREATION, AV FISTULA;  Surgeon: RODRIGO Friedman III, MD;  Location: [facility] OR 2ND FLR;  Service: Vascular;  Laterality: Left;  LUE AVF creation vs AVG insertion    BIOPSY OF URETER Left 2/18/2022    Procedure: BIOPSY, URETER;  Surgeon: Ronel Whittington MD;  Location: [facility] OR 1ST FLR;  Service: Urology;  Laterality: Left;    BREAST BIOPSY Right     benign    CHOLECYSTECTOMY      COLONOSCOPY N/A 1/13/2017    Procedure: COLONOSCOPY;  Surgeon: Morris Wiseman MD;  Location: [facility] ENDO (4TH FLR);  Service: Endoscopy;  Laterality: N/A;  Renal pt Nephrology advised to avoid phosphate preps    COLONOSCOPY N/A 12/7/2023    Procedure: COLONOSCOPY;  Surgeon: Herve Allen MD;  Location: [facility] ENDO (2ND FLR);  Service: Endoscopy;  Laterality: N/A;  HD MWF; labs prior  suprapubic catheter  pt does not ambulate-uses christina lift- will have sling with her  9/7 ref. by Anastasiia Campbell, , PEG, instr. mailed, Eliquis hold approval pending-st  ok to  hold Eliquis 2 days per Dr Navarro-GT  1/30-precall complete-MS    CYSTOSCOPY N/A 10/8/2018    Procedure: CYSTOSCOPY;  Surgeon: Ronel Whittington MD;  Location: [facility] OR 1ST FLR;  Service: Urology;  Laterality: N/A;  45 min    CYSTOSCOPY N/A 3/25/2019    Procedure: CYSTOSCOPY;  Surgeon: Ronel Whittington MD;  Location: [facility] OR 1ST FLR;  Service: Urology;  Laterality: N/A;  45 min    CYSTOSCOPY N/A 8/26/2019    Procedure: CYSTOSCOPY;  Surgeon: Ronel Whittington MD;  Location: [facility] OR 1ST FLR;  Service: Urology;  Laterality: N/A;  45 min    CYSTOSCOPY N/A 7/2/2021    Procedure: CYSTOSCOPY;  Surgeon: Ronel Whittington MD;  Location: [facility] OR [facility] FLR;  Service: Urology;  Laterality: N/A;    CYSTOSCOPY  12/23/2021    Procedure: CYSTOSCOPY;  Surgeon: Ronel Whittington MD;  [facility];  Service: Urology;;    CYSTOSCOPY  2/18/2022    Procedure: CYSTOSCOPY;  Surgeon: Ronel Whittington MD;  [facility];  Service: Urology;;    CYSTOSCOPY W/ URETERAL STENT PLACEMENT Left 5/15/2021    Procedure: CYSTOSCOPY, WITH URETERAL STENT INSERTION;  Surgeon: Levy Sánchez Jr., MD;  [facility];  Service: Urology;  Laterality: Left;    CYSTOSCOPY WITH BIOPSY OF BLADDER N/A 1/27/2020    Procedure: CYSTOSCOPY, WITH BLADDER BIOPSY, WITH FULGURATION IF INDICATED;  Surgeon: Ronel Whittington MD;  Location: [facility] OR [facility] FLR;  Service: Urology;  Laterality: N/A;    DILATION AND CURETTAGE OF UTERUS      FISTULOGRAM N/A 4/29/2024    Procedure: Fistulogram;  Surgeon: RODRIGO Friedman III, MD;  Location: [facility] CATH LAB;  Service: Peripheral Vascular;  Laterality: N/A;    GALLBLADDER SURGERY  2006    HYSTERECTOMY      INJECTION FOR SENTINEL NODE IDENTIFICATION Left 8/1/2019    Procedure: INJECTION, FOR SENTINEL NODE IDENTIFICATION;  Surgeon: Samia Fulton MD;  Location: [facility] OR [address] [place] [address];  Service: General;  Laterality: Left;    INJECTION OF BOTULINUM TOXIN TYPE A N/A  10/8/2018    Procedure: INJECTION, BOTULINUM TOXIN, TYPE A 300 UNITS;  Surgeon: Ronel Whittington MD;  [facility];  Service: Urology;  Laterality: N/A;    INJECTION OF BOTULINUM TOXIN TYPE A N/A 3/25/2019    Procedure: INJECTION, BOTULINUM TOXIN, TYPE A 300 UNITS;  Surgeon: Ronel Whittington MD;  [facility];  Service: Urology;  Laterality: N/A;    INJECTION OF BOTULINUM TOXIN TYPE A N/A 8/26/2019    Procedure: INJECTION, BOTULINUM TOXIN, TYPE A 300 UNITS;  Surgeon: Ronel Whittington MD;  [facility];  Service: Urology;  Laterality: N/A;    MASTECTOMY Left 8/1/2019    Procedure: MASTECTOMY 23 hour stay;  Surgeon: Samia Fulton MD;  Location: [facility] OR 2ND FLR;  Service: General;  Laterality: Left;    MEDIPORT REMOVAL Right 6/24/2022    Procedure: REMOVAL, CATHETER, CENTRAL VENOUS, TUNNELED, WITH PORT;  Surgeon: Isauro Anderson MD;  Location: [facility] CATH LAB;  Service: Radiology;  Laterality: Right;    OVARIAN CYST SURGERY  1985    PERCUTANEOUS TRANSLUMINAL ANGIOPLASTY OF ARTERIOVENOUS FISTULA Left 4/29/2024    Procedure: PTA, AV FISTULA;  Surgeon: RODRIGO Friedman III, MD;  Location: [facility] CATH LAB;  Service: Peripheral Vascular;  Laterality: Left;    REPLACEMENT OF STENT Left 12/23/2021    Procedure: REPLACEMENT, STENT;  Surgeon: Ronel Whittington MD;  Location: [facility] OR [address] [place] [address];  Service: Urology;  Laterality: Left;    REPLACEMENT OF STENT Left 2/18/2022    Procedure: REPLACEMENT, STENT;  Surgeon: Ronel Whittington MD;  [facility];  Service: Urology;  Laterality: Left;    RETROGRADE PYELOGRAPHY Left 2/18/2022    Procedure: PYELOGRAM, RETROGRADE;  Surgeon: Ronel Whittington MD;  [facility];  Service: Urology;  Laterality: Left;    SENTINEL LYMPH NODE BIOPSY Left 8/1/2019    Procedure: BIOPSY, LYMPH NODE, SENTINEL;  Surgeon: Samia Fulton MD;  [facility];  Service: General;  Laterality: Left;    spt  placement      TONSILLECTOMY, ADENOIDECTOMY      TOTAL ABDOMINAL HYSTERECTOMY W/ BILATERAL SALPINGOOPHORECTOMY  1985    URETEROSCOPY Left 2/18/2022    Procedure: URETEROSCOPY;  Surgeon: Ronel Whittington MD;  Location: [facility] OR [address] [place] [address];  Service: Urology;  Laterality: Left;       Review of patient's allergies indicates:  No Known Allergies  Current Facility-Administered Medications   Medication Frequency    acetaminophen tablet 1,000 mg Q8H PRN    acetaminophen tablet 650 mg Q4H PRN    albuterol-ipratropium 2.5 mg-0.5 mg/3 mL nebulizer solution 3 mL Q4H PRN    aluminum-magnesium hydroxide-simethicone 200-200-20 mg/5 mL suspension 30 mL QID PRN    amLODIPine tablet 10 mg Daily    anastrozole tablet 1 mg Daily    apixaban tablet 5 mg Daily    atorvastatin tablet 80 mg Daily    bisacodyL suppository 10 mg Daily PRN    calcium acetate(phosphat bind) capsule 667 mg TID WM    carvediloL tablet 6.25 mg BID    dextrose 10% bolus 125 mL 125 mL PRN    dextrose 10% bolus 250 mL 250 mL PRN    DULoxetine DR capsule 40 mg Daily    furosemide tablet 40 mg Daily    glucagon (human recombinant) injection 1 mg PRN    glucose chewable tablet 16 g PRN    glucose chewable tablet 24 g PRN    hydrALAZINE tablet 25 mg Q8H PRN    HYDROcodone-acetaminophen  mg per tablet 1 tablet Q6H PRN    insulin aspart U-100 pen 0-5 Units QID (AC + HS) PRN    insulin glargine U-100 (Lantus) pen 10 Units QHS    levothyroxine tablet 50 mcg Before breakfast    losartan tablet 100 mg Daily    methocarbamoL tablet 500 mg QID    naloxone 0.4 mg/mL injection 0.02 mg PRN    ondansetron disintegrating tablet 8 mg Q8H PRN    oxybutynin 24 hr tablet 10 mg Daily    piperacillin-tazobactam (ZOSYN) 4.5 g in dextrose 5 % in water (D5W) 100 mL IVPB (MB+) Q12H    polyethylene glycol packet 17 g Daily    prochlorperazine injection Soln 5 mg Q6H PRN    simethicone chewable tablet 80 mg QID PRN    sodium bicarbonate tablet 650 mg Daily    sodium chloride 0.9% flush  5 mL PRN    sumatriptan tablet 100 mg BID PRN    traZODone tablet 100 mg Nightly PRN     Facility-Administered Medications Ordered in Other Encounters   Medication Frequency    epoetin chloe injection 2,000 Units 1 time in Clinic/HOD    heparin (porcine) injection 2,000 Units Once    heparin (porcine) injection 2,000 Units Once    heparin (porcine) injection 4,000 Units 1 time in Clinic/HOD    iron sucrose injection 100 mg 1 time in Clinic/HOD     Family History       Problem Relation (Age of Onset)    ALS Mother    Cancer Maternal Grandmother, Paternal Grandfather, Brother    Diabetes Sister, Maternal Aunt, Maternal Uncle    Kidney disease Sister, Maternal Aunt    Prostate cancer Brother    Scoliosis Brother          Tobacco Use    Smoking status: Never    Smokeless tobacco: Never   Substance and Sexual Activity    Alcohol use: No     Alcohol/week: 0.0 standard drinks of alcohol    Drug use: No    Sexual activity: Not Currently     Birth control/protection: Abstinence     Review of Systems   Constitutional: Negative.    HENT: Negative.     Eyes: Negative.    Respiratory: Negative.     Cardiovascular: Negative.    Gastrointestinal: Negative.    Endocrine: Negative.    Genitourinary:  Positive for decreased urine volume.   Musculoskeletal: Negative.    Skin: Negative.    Allergic/Immunologic: Negative.    Neurological: Negative.    Hematological: Negative.    Psychiatric/Behavioral: Negative.       Objective:     Vital Signs (Most Recent):  Temp: 99.6 °F (37.6 °C) (06/13/24 1140)  Pulse: 72 (06/13/24 1140)  Resp: 18 (06/13/24 1140)  BP: (!) 150/74 (06/13/24 1140)  SpO2: 98 % (06/13/24 1140) Vital Signs (24h Range):  Temp:  [98.2 °F (36.8 °C)-99.6 °F (37.6 °C)] 99.6 °F (37.6 °C)  Pulse:  [72-98] 72  Resp:  [16-18] 18  SpO2:  [95 %-100 %] 98 %  BP: (145-193)/(70-89) 150/74     Weight: 103.7 kg (228 lb 9.9 oz) (06/13/24 0010)  Body mass index is 34.76 kg/m².  Body surface area is 2.23 meters squared.    I/O last 3  completed shifts:  In: 1717 [P.O.:200; IV Piggyback:1517]  Out: 1100 [Urine:1100]     Physical Exam  Vitals and nursing note reviewed.   HENT:      Mouth/Throat:      Pharynx: Oropharynx is clear.   Eyes:      Conjunctiva/sclera: Conjunctivae normal.   Cardiovascular:      Rate and Rhythm: Normal rate.      Pulses: Normal pulses.      Arteriovenous access: Left arteriovenous access is present.     Comments: AVF + thrill   TDC catheter     Pulmonary:      Effort: Pulmonary effort is normal.   Abdominal:      Palpations: Abdomen is soft.   Musculoskeletal:      Right lower leg: No edema.      Left lower leg: No edema.   Neurological:      Mental Status: She is alert and oriented to person, place, and time.   Psychiatric:         Mood and Affect: Mood normal.          Significant Labs:  CBC:   Recent Labs   Lab 06/13/24  0554   WBC 10.85   RBC 3.01*   HGB 9.0*   HCT 28.5*      MCV 95   MCH 29.9   MCHC 31.6*     CMP:   Recent Labs   Lab 06/13/24  0554      CALCIUM 8.5*   ALBUMIN 2.3*   PROT 5.6*   *   K 3.7   CO2 18*   CL 99   BUN 34*   CREATININE 2.9*   ALKPHOS 86   ALT 7*   AST 9*   BILITOT 0.3     All labs within the past 24 hours have been reviewed.

## 2024-06-13 NOTE — PLAN OF CARE
AAOx4. BPs 140s-190s/70s-90s. PM coreg administered. All other VSS. On RA. C/o chronic, generalized back pain. Scheduled robaxin given. Zosyn q12 IV given per MAR. Pressure injury on buttock cleansed with NS, and barrier cream applied. Cath care performed. I&Os recorded. Blood culture preliminary results- NGTD. Urine culture pending. Patient producing clear yellow urine with sediment. Accu checks ACHS. Bed in low position. Bed wheels locked. Side rails raisedx2. Personal items and call bell w/I reach.   no

## 2024-06-13 NOTE — ASSESSMENT & PLAN NOTE
Patient has hyponatremia which is uncontrolled,We will aim to correct the sodium by 4-6mEq in 24 hours. We will monitor sodium Daily. The hyponatremia is due to renal insufficiency. We will obtain the following studies: Urine sodium, urine osmolality, serum osmolality or TSH, T4. We will treat the hyponatremia with Hemodialysis. The patient's sodium results have been reviewed and are listed below.  Recent Labs   Lab 06/12/24  1235   *

## 2024-06-13 NOTE — ASSESSMENT & PLAN NOTE
Patient's FSGs are uncontrolled due to hyperglycemia on current medication regimen.  Last A1c reviewed-   Lab Results   Component Value Date    HGBA1C 6.2 (H) 04/22/2024     Most recent fingerstick glucose reviewed-   Recent Labs   Lab 06/12/24  1922 06/12/24  2134 06/13/24  0742 06/13/24  1210   POCTGLUCOSE 251* 228* 117* 144*       Current correctional scale  Low  Maintain anti-hyperglycemic dose as follows-   Antihyperglycemics (From admission, onward)    Start     Stop Route Frequency Ordered    06/12/24 2100  insulin glargine U-100 (Lantus) pen 10 Units         -- SubQ Nightly 06/1952    06/12/24 1846  insulin aspart U-100 pen 0-5 Units         -- SubQ Before meals & nightly PRN 06/12/24 1747        Hold Oral hypoglycemics while patient is in the hospital.

## 2024-06-13 NOTE — CONSULTS
[facility] - Transplant Stepdown  Nephrology  Consult Note    Patient Name: Cecile Bowen  MRN: [id number]  Admission Date: 6/12/2024  Hospital Length of Stay: 1 days  Attending Provider: Zoë Roblero MD   Primary Care Physician: Lurdes Navarro MD  Principal Problem:Sepsis    Inpatient consult to Nephrology  Consult performed by: Lamar Negro DNP  Consult ordered by: Nallely Newby MD  Reason for consult: ESRD        Subjective:     HPI:  73-year-old female with past medical history of  ESRD on HD M/F, hypertension, hyperlipidemia, ARON, breast CA, chronic indwelling urinary catheter who presents ED for concern for UTI.  nurse was seeing patient and concerned for cloudy urine, murillo exchanged today. End stage renal disease secondary to HTN and DM2 suspected. Was previously on dialysis and it was stopped for many months and then restarted per patient ~ 10/23. Last HD prior to presentation was on 6/10. Nephrology consulted for ESRD.     Past Medical History:   Diagnosis Date    Cervicogenic migraine     Chronic pain     CKD (chronic kidney disease) stage 4, GFR 15-29 ml/min     [facility]    CKD (chronic kidney disease) stage 4, GFR 15-29 ml/min     Diabetes mellitus     Long term use of Insulin, Diabetic Neuropathy    Dialysis patient 1/21/2022    Fibromyalgia     Hydronephrosis     Hyperlipidemia     Hypertension 12/12/2012    Hypothyroidism 12/12/2012    ARON (iron deficiency anemia)     Insomnia     Levoscoliosis     Malignant neoplasm of upper-outer quadrant of left breast in female, estrogen receptor positive     Metabolic acidosis     Mobility impaired     Nuclear sclerosis - Both Eyes 3/24/2014    VIJAYA (obstructive sleep apnea)     Osteopenia     Pulmonary nodule     Recurrent UTI     Renal manifestation of secondary diabetes mellitus     Secondary hyperparathyroidism, renal     Urinary retention        Past Surgical History:   Procedure Laterality Date    AV FISTULA PLACEMENT Left  2/14/2024    Procedure: CREATION, AV FISTULA;  Surgeon: RODRIGO Friedman III, MD;  Location: [facility] OR 2ND FLR;  Service: Vascular;  Laterality: Left;  LUE AVF creation vs AVG insertion    BIOPSY OF URETER Left 2/18/2022    Procedure: BIOPSY, URETER;  Surgeon: Ronel Whittington MD;  Location: [facility] OR 1ST FLR;  Service: Urology;  Laterality: Left;    BREAST BIOPSY Right     benign    CHOLECYSTECTOMY      COLONOSCOPY N/A 1/13/2017    Procedure: COLONOSCOPY;  Surgeon: Morris Wiseman MD;  Location: [facility] ENDO (4TH FLR);  Service: Endoscopy;  Laterality: N/A;  Renal pt Nephrology advised to avoid phosphate preps    COLONOSCOPY N/A 12/7/2023    Procedure: COLONOSCOPY;  Surgeon: Herve Allen MD;  Location: [facility] ENDO (2ND FLR);  Service: Endoscopy;  Laterality: N/A;  HD MWF; labs prior  suprapubic catheter  pt does not ambulate-uses christina lift- will have sling with her  9/7 ref. by Anastasiia Campbell, , PEG, instr. mailed, Eliquis hold approval pending-[facility] to hold Eliquis 2 days per Dr Navarro-GT  1/30-precall complete-MS    CYSTOSCOPY N/A 10/8/2018    Procedure: CYSTOSCOPY;  Surgeon: Ronel Whittington MD;  [facility];  Service: Urology;  Laterality: N/A;  45 min    CYSTOSCOPY N/A 3/25/2019    Procedure: CYSTOSCOPY;  Surgeon: Ronel Whittington MD;  [facility];  Service: Urology;  Laterality: N/A;  45 min    CYSTOSCOPY N/A 8/26/2019    Procedure: CYSTOSCOPY;  Surgeon: Ronel Whittington MD;  [facility];  Service: Urology;  Laterality: N/A;  45 min    CYSTOSCOPY N/A 7/2/2021    Procedure: CYSTOSCOPY;  Surgeon: Ronel Whittington MD;  [facility];  Service: Urology;  Laterality: N/A;    CYSTOSCOPY  12/23/2021    Procedure: CYSTOSCOPY;  Surgeon: Ronel Whittington MD;  Location: [facility] OR [address] [place] [address];  Service: Urology;;    CYSTOSCOPY  2/18/2022    Procedure: CYSTOSCOPY;  Surgeon: Ronel Whittington MD;  Location: [facility] OR [address] [place] [address];   Service: Urology;;    CYSTOSCOPY W/ URETERAL STENT PLACEMENT Left 5/15/2021    Procedure: CYSTOSCOPY, WITH URETERAL STENT INSERTION;  Surgeon: Levy Sánchez Jr., MD;  Location: [address];  Service: Urology;  Laterality: Left;    CYSTOSCOPY WITH BIOPSY OF BLADDER N/A 1/27/2020    Procedure: CYSTOSCOPY, WITH BLADDER BIOPSY, WITH FULGURATION IF INDICATED;  Surgeon: Ronel Whittington MD;  Location: [address];  Service: Urology;  Laterality: N/A;    DILATION AND CURETTAGE OF UTERUS      FISTULOGRAM N/A 4/29/2024    Procedure: Fistulogram;  Surgeon: RODRIGO Friedman III, MD;  Location: [facility] CATH LAB;  Service: Peripheral Vascular;  Laterality: N/A;    GALLBLADDER SURGERY  2006    HYSTERECTOMY      INJECTION FOR SENTINEL NODE IDENTIFICATION Left 8/1/2019    Procedure: INJECTION, FOR SENTINEL NODE IDENTIFICATION;  Surgeon: Samia Fulton MD;  Location: [address];  Service: General;  Laterality: Left;    INJECTION OF BOTULINUM TOXIN TYPE A N/A 10/8/2018    Procedure: INJECTION, BOTULINUM TOXIN, TYPE A 300 UNITS;  Surgeon: Ronel Whittington MD;  Location: [address];  Service: Urology;  Laterality: N/A;    INJECTION OF BOTULINUM TOXIN TYPE A N/A 3/25/2019    Procedure: INJECTION, BOTULINUM TOXIN, TYPE A 300 UNITS;  Surgeon: Ronel Whittington MD;  Location: [address];  Service: Urology;  Laterality: N/A;    INJECTION OF BOTULINUM TOXIN TYPE A N/A 8/26/2019    Procedure: INJECTION, BOTULINUM TOXIN, TYPE A 300 UNITS;  Surgeon: Ronel Whittington MD;  Location: [address];  Service: Urology;  Laterality: N/A;    MASTECTOMY Left 8/1/2019    Procedure: MASTECTOMY 23 hour stay;  Surgeon: Samia Fulton MD;  Location: [address];  Service: General;  Laterality: Left;    MEDIPORT REMOVAL Right 6/24/2022    Procedure: REMOVAL, CATHETER, CENTRAL VENOUS, TUNNELED, WITH PORT;  Surgeon: Isauro Anderson MD;  Location: [facility] CATH LAB;  Service: Radiology;  Laterality: Right;     OVARIAN CYST SURGERY  1985    PERCUTANEOUS TRANSLUMINAL ANGIOPLASTY OF ARTERIOVENOUS FISTULA Left 4/29/2024    Procedure: PTA, AV FISTULA;  Surgeon: RODRIGO Friedman III, MD;  Location: [facility] CATH LAB;  Service: Peripheral Vascular;  Laterality: Left;    REPLACEMENT OF STENT Left 12/23/2021    Procedure: REPLACEMENT, STENT;  Surgeon: Ronel Whittington MD;  Location: [facility] OR [facility] FLR;  Service: Urology;  Laterality: Left;    REPLACEMENT OF STENT Left 2/18/2022    Procedure: REPLACEMENT, STENT;  Surgeon: Ronel Whittington MD;  Location: [facility] OR 1ST FLR;  Service: Urology;  Laterality: Left;    RETROGRADE PYELOGRAPHY Left 2/18/2022    Procedure: PYELOGRAM, RETROGRADE;  Surgeon: Ronel Whittington MD;  [facility];  Service: Urology;  Laterality: Left;    SENTINEL LYMPH NODE BIOPSY Left 8/1/2019    Procedure: BIOPSY, LYMPH NODE, SENTINEL;  Surgeon: Samia Fulton MD;  [facility];  Service: General;  Laterality: Left;    spt placement      TONSILLECTOMY, ADENOIDECTOMY      TOTAL ABDOMINAL HYSTERECTOMY W/ BILATERAL SALPINGOOPHORECTOMY  1985    URETEROSCOPY Left 2/18/2022    Procedure: URETEROSCOPY;  Surgeon: Ronel Whittington MD;  [facility];  Service: Urology;  Laterality: Left;       Review of patient's allergies indicates:  No Known Allergies  Current Facility-Administered Medications   Medication Frequency    acetaminophen tablet 1,000 mg Q8H PRN    acetaminophen tablet 650 mg Q4H PRN    albuterol-ipratropium 2.5 mg-0.5 mg/3 mL nebulizer solution 3 mL Q4H PRN    aluminum-magnesium hydroxide-simethicone 200-200-20 mg/5 mL suspension 30 mL QID PRN    amLODIPine tablet 10 mg Daily    anastrozole tablet 1 mg Daily    apixaban tablet 5 mg Daily    atorvastatin tablet 80 mg Daily    bisacodyL suppository 10 mg Daily PRN    calcium acetate(phosphat bind) capsule 667 mg TID WM    carvediloL tablet 6.25 mg BID    dextrose 10% bolus 125 mL 125 mL PRN    dextrose 10%  bolus 250 mL 250 mL PRN    DULoxetine DR capsule 40 mg Daily    furosemide tablet 40 mg Daily    glucagon (human recombinant) injection 1 mg PRN    glucose chewable tablet 16 g PRN    glucose chewable tablet 24 g PRN    hydrALAZINE tablet 25 mg Q8H PRN    HYDROcodone-acetaminophen  mg per tablet 1 tablet Q6H PRN    insulin aspart U-100 pen 0-5 Units QID (AC + HS) PRN    insulin glargine U-100 (Lantus) pen 10 Units QHS    levothyroxine tablet 50 mcg Before breakfast    losartan tablet 100 mg Daily    methocarbamoL tablet 500 mg QID    naloxone 0.4 mg/mL injection 0.02 mg PRN    ondansetron disintegrating tablet 8 mg Q8H PRN    oxybutynin 24 hr tablet 10 mg Daily    piperacillin-tazobactam (ZOSYN) 4.5 g in dextrose 5 % in water (D5W) 100 mL IVPB (MB+) Q12H    polyethylene glycol packet 17 g Daily    prochlorperazine injection Soln 5 mg Q6H PRN    simethicone chewable tablet 80 mg QID PRN    sodium bicarbonate tablet 650 mg Daily    sodium chloride 0.9% flush 5 mL PRN    sumatriptan tablet 100 mg BID PRN    traZODone tablet 100 mg Nightly PRN     Facility-Administered Medications Ordered in Other Encounters   Medication Frequency    epoetin chloe injection 2,000 Units 1 time in Clinic/HOD    heparin (porcine) injection 2,000 Units Once    heparin (porcine) injection 2,000 Units Once    heparin (porcine) injection 4,000 Units 1 time in Clinic/HOD    iron sucrose injection 100 mg 1 time in Clinic/HOD     Family History       Problem Relation (Age of Onset)    ALS Mother    Cancer Maternal Grandmother, Paternal Grandfather, Brother    Diabetes Sister, Maternal Aunt, Maternal Uncle    Kidney disease Sister, Maternal Aunt    Prostate cancer Brother    Scoliosis Brother          Tobacco Use    Smoking status: Never    Smokeless tobacco: Never   Substance and Sexual Activity    Alcohol use: No     Alcohol/week: 0.0 standard drinks of alcohol    Drug use: No    Sexual activity: Not Currently     Birth control/protection:  Abstinence     Review of Systems   Constitutional: Negative.    HENT: Negative.     Eyes: Negative.    Respiratory: Negative.     Cardiovascular: Negative.    Gastrointestinal: Negative.    Endocrine: Negative.    Genitourinary:  Positive for decreased urine volume.   Musculoskeletal: Negative.    Skin: Negative.    Allergic/Immunologic: Negative.    Neurological: Negative.    Hematological: Negative.    Psychiatric/Behavioral: Negative.       Objective:     Vital Signs (Most Recent):  Temp: 99.6 °F (37.6 °C) (06/13/24 1140)  Pulse: 72 (06/13/24 1140)  Resp: 18 (06/13/24 1140)  BP: (!) 150/74 (06/13/24 1140)  SpO2: 98 % (06/13/24 1140) Vital Signs (24h Range):  Temp:  [98.2 °F (36.8 °C)-99.6 °F (37.6 °C)] 99.6 °F (37.6 °C)  Pulse:  [72-98] 72  Resp:  [16-18] 18  SpO2:  [95 %-100 %] 98 %  BP: (145-193)/(70-89) 150/74     Weight: 103.7 kg (228 lb 9.9 oz) (06/13/24 0010)  Body mass index is 34.76 kg/m².  Body surface area is 2.23 meters squared.    I/O last 3 completed shifts:  In: 1717 [P.O.:200; IV Piggyback:1517]  Out: 1100 [Urine:1100]     Physical Exam  Vitals and nursing note reviewed.   HENT:      Mouth/Throat:      Pharynx: Oropharynx is clear.   Eyes:      Conjunctiva/sclera: Conjunctivae normal.   Cardiovascular:      Rate and Rhythm: Normal rate.      Pulses: Normal pulses.      Arteriovenous access: Left arteriovenous access is present.     Comments: AVF + thrill   TDC catheter     Pulmonary:      Effort: Pulmonary effort is normal.   Abdominal:      Palpations: Abdomen is soft.   Musculoskeletal:      Right lower leg: No edema.      Left lower leg: No edema.   Neurological:      Mental Status: She is alert and oriented to person, place, and time.   Psychiatric:         Mood and Affect: Mood normal.          Significant Labs:  CBC:   Recent Labs   Lab 06/13/24  0554   WBC 10.85   RBC 3.01*   HGB 9.0*   HCT 28.5*      MCV 95   MCH 29.9   MCHC 31.6*     CMP:   Recent Labs   Lab 06/13/24  0554       CALCIUM 8.5*   ALBUMIN 2.3*   PROT 5.6*   *   K 3.7   CO2 18*   CL 99   BUN 34*   CREATININE 2.9*   ALKPHOS 86   ALT 7*   AST 9*   BILITOT 0.3     All labs within the past 24 hours have been reviewed.      Assessment/Plan:     Renal/  ESRD (end stage renal disease) on dialysis  Nephrology History  iHD Schedule:    Unit/MD: [facility]/Dr. Hare   Duration: 4 hours   EDW: 100 kg.   Access: JENNY AVF (recently ok to use by vascular). Currently using TDC   Residual Renal Function: +++    Assessment:     - Continue MF iHD schedule   - Plan for next HD 6/14   - continue lasix   - Hyponatremia in setting of ESRD - 1L fluid restriction   - resume home phos binders       ID  * Sepsis  - management per primary                  Thank you for your consult. I will follow-up with patient. Please contact us if you have any additional questions.    Lamar Negro, MUNA  Nephrology  [facility] - Transplant Stepdown

## 2024-06-13 NOTE — ASSESSMENT & PLAN NOTE
Creatine stable for now. BMP reviewed- noted Estimated Creatinine Clearance: 19.3 mL/min (A) (based on SCr of 3.3 mg/dL (H)). according to latest data. Based on current GFR, CKD stage is end stage.  Monitor UOP and serial BMP and adjust therapy as needed. Renally dose meds. Avoid nephrotoxic medications and procedures.    - ESRD on HD M/F  - renal consulted for HD  - does have RUE fistula, but currently dialyzing through tunneled HD cath

## 2024-06-13 NOTE — PROGRESS NOTES
Nurses Note -- 4 Eyes      6/13/2024   6:33 PM      Skin assessed during: Daily Assessment      [] No Altered Skin Integrity Present    []Prevention Measures Documented      [x] Yes- Altered Skin Integrity Present or Discovered   [x] LDA Added if Not in Epic (Describe Wound)   [x] New Altered Skin Integrity was Present on Admit and Documented in LDA   [] Wound Image Taken    Wound Care Consulted? Yes    Attending Nurse:  Cecille Pemberton RN/Staff Member:   Carlotta DUPONT

## 2024-06-13 NOTE — PLAN OF CARE
[facility] - Transplant Stepdown  Initial Discharge Assessment       Primary Care Provider: Lurdes Navarro MD    Admission Diagnosis: Tachycardia [R00.0]  Chest pain [R07.9]  Urinary tract infection associated with indwelling urethral catheter, initial encounter [T83.511A, N39.0]  Sepsis, due to unspecified organism, unspecified whether acute organ dysfunction present [A41.9]    Admission Date: 6/12/2024  Expected Discharge Date: 6/14/2024    Transition of Care Barriers: None    Payor: HUMANA MANAGED MEDICARE / Plan: HUMANA [employer] HMO PPO SPECIAL NEEDS / Product Type: Medicare Advantage /     Extended Emergency Contact Information  Primary Emergency Contact: Kat Sánchez  Address: [address] [place] [address] [place]  Mobile Phone: [phone]  Relation: Sister    Discharge Plan A: Home, Home with family, Home Health  Discharge Plan B: Skilled Nursing Facility      [id number] [place] [facility] [address] [facility] [place] [facility]  [address] [place] [address]  Phone: [phone] Fax: [phone]    [id number] - [place], [place] - [address]  [address]  [place] [address]  Phone: [phone] Fax: [phone]      Initial Assessment (most recent)       Adult Discharge Assessment - 06/13/24 1039          Discharge Assessment    Assessment Type Discharge Planning Assessment     Confirmed/corrected address, phone number and insurance Yes     Confirmed Demographics Correct on Facesheet     Source of Information patient     Communicated FLORENTINO with patient/caregiver Date not available/Unable to determine     Reason For Admission Sepsis     People in Home sibling(s)     Do you expect to return to your current living situation? Yes     Do you have help at home or someone to help you manage your care at home? Yes     Who are your caregiver(s) and their phone number(s)? Kat Sánchez (Sister) [phone]      Prior to hospitilization cognitive status: Alert/Oriented     Current cognitive status: Alert/Oriented     Walking or Climbing Stairs ambulation difficulty, assistance 1 person     Dressing/Bathing Difficulty no     Equipment Currently Used at Home hospital bed;lift device;wheelchair     Patient currently being followed by outpatient case management? No     Do you currently have service(s) that help you manage your care at home? Yes     Name and Contact number of agency [facility]     Is the pt/caregiver preference to resume services with current agency Yes     Do you take prescription medications? Yes     Do you have prescription coverage? Yes     Coverage HUMANA MANAGED MEDICARE - HUMANA [facility] HMO PPO SPECIAL NEEDS - CAPITATED     Is the patient taking medications as prescribed? yes     Are you on dialysis? Yes     Dialysis Name and Scheduled days [facility] MWF     Do you take coumadin? No     Discharge Plan A Home;Home with family;Home Health     Discharge Plan B Skilled Nursing Facility     DME Needed Upon Discharge  other (see comments)   TBD    Discharge Plan discussed with: Patient     Transition of Care Barriers None        Physical Activity    On average, how many days per week do you engage in moderate to strenuous exercise (like a brisk walk)? Patient declined     On average, how many minutes do you engage in exercise at this level? Patient declined        Financial Resource Strain    How hard is it for you to pay for the very basics like food, housing, medical care, and heating? Patient declined        Housing Stability    In the last 12 months, was there a time when you were not able to pay the mortgage or rent on time? Patient declined     At any time in the past 12 months, were you homeless or living in a shelter (including now)? Patient declined        Transportation Needs    Has the lack of transportation kept you from medical appointments, meetings, work or from getting things  needed for daily living? Patient declined        Food Insecurity    Within the past 12 months, you worried that your food would run out before you got the money to buy more. Patient declined     Within the past 12 months, the food you bought just didn't last and you didn't have money to get more. Patient declined        Stress    Do you feel stress - tense, restless, nervous, or anxious, or unable to sleep at night because your mind is troubled all the time - these days? Patient declined        Social Isolation    How often do you feel lonely or isolated from those around you?  Patient declined        Alcohol Use    Q1: How often do you have a drink containing alcohol? Patient declined     Q2: How many drinks containing alcohol do you have on a typical day when you are drinking? Patient declined     Q3: How often do you have six or more drinks on one occasion? Patient declined        Utilities    In the past 12 months has the electric, gas, oil, or water company threatened to shut off services in your home? Patient declined        Health Literacy    How often do you need to have someone help you when you read instructions, pamphlets, or other written material from your doctor or pharmacy? Patient declines to respond                   The SW met with the patient at bedside and contacted the patient's sister by phone to complete the DPA. The patient has a wheelchair, christina lift, and hospital bed. The patient is not Coumadin. [facility] The patient does to Dialysis on Monday and Friday. The patient will need transportation upon discharge. The patient's sister will be providing assistance/ help upon discharge.     Discharge Plan A and Plan B have been determined by review of patient's clinical status, future medical and therapeutic needs, and coverage/benefits for post-acute care in coordination with multidisciplinary team members.    Jordan Bravo LCSW  Case Management [facility]  [place]

## 2024-06-13 NOTE — ASSESSMENT & PLAN NOTE
Patient calls back and states that he is having issues with his Meter and sensors for his Glucose monitoring system. He states that his sensors are sticking to him and that his reader is not working. He spoke with [facility] that states that our office needs to send new orders for these but isn't sure where orders need to be sent to. Writer called [facility] and spoke with Rut Zapien. She state that the patient is not able to order a new reader through them because the reader has been discontinued. She states that he can either call the manufactor and see if they will replace his meter, which she states he is over his 1 year warranty on the meter so they may not be able to or he can use his phone if it is compatibles he can download the [place] and use his phone as a reader. He can also pay out of pocket for a new reader. She states a Dexcom would be about $500 and a Vearl Beery can be purchased at a [employer] or [employer] for she believes $80, but this would be out of pocket and he would have to make sure his current sensors would work with that reader. She states Medicare will not cover a new comparable device for 5 years. He was just sent new sensors in February that he called to [facility] and because he received a 3 month supply they would not be able to send him new sensors of any brand until June. If he wanted to go back to traditional testing with a meter,strips and lancets. Medicare would not cover this until June as well because he was sent sensors and would be considers duplicate therapy. Patient's FSGs are uncontrolled due to hyperglycemia on current medication regimen.  Last A1c reviewed-   Lab Results   Component Value Date    HGBA1C 6.2 (H) 04/22/2024     Most recent fingerstick glucose reviewed-   Recent Labs   Lab 06/12/24 1922   POCTGLUCOSE 251*     Current correctional scale  Low  Maintain anti-hyperglycemic dose as follows-   Antihyperglycemics (From admission, onward)      Start     Stop Route Frequency Ordered    06/12/24 2100  insulin glargine U-100 (Lantus) pen 10 Units         -- SubQ Nightly 06/1952 06/12/24 1846  insulin aspart U-100 pen 0-5 Units         -- SubQ Before meals & nightly PRN 06/12/24 1747          Hold Oral hypoglycemics while patient is in the hospital.

## 2024-06-13 NOTE — ASSESSMENT & PLAN NOTE
- history of recurrent UTI with indwelling cath in place, changed 6/12  - prior Ucx pansensitive Pseudomonas   - Ucx in process, cont zosyn for now

## 2024-06-13 NOTE — ASSESSMENT & PLAN NOTE
Patient has hyponatremia which is uncontrolled,We will aim to correct the sodium by 4-6mEq in 24 hours. We will monitor sodium Daily. The hyponatremia is due to renal insufficiency. We will obtain the following studies: Urine sodium, urine osmolality, serum osmolality or TSH, T4. We will treat the hyponatremia with Hemodialysis and 1L fluid restriction. The patient's sodium results have been reviewed and are listed below.  Recent Labs   Lab 06/13/24  0554   *

## 2024-06-13 NOTE — PLAN OF CARE
Pt admitted 6/12 with fevers and possible UTI/sepsis. Pt is AAOx4, VSS on bedside monitor, afebrile (tmax 99.6F), on RA. Pt is HD-dependent. L a/c fistula +/+, R subclavian tunneled catheter with C/D/I gauze dressing. Per pt, dressing was changed last on Monday. Plan for HD tomorrow (typical schedule M/F). Hyponatremia noted, FR initiated. Pt is bed-bound, able to help a little with turning. Waffle mattress applied. Turning q2hrs. Bed alarm set. Sacral redness/excoriation/breakdown noted. Wound care following. Barrier cream applied. Bilateral axillary moisture-associated dermatitis observed as well. Miconazole powder applied. L forearm skin tear is scabbed over, C/D/I. Pt wears L wrist brace which she reports is for carpal tunnel. Suprapubic catheter in place, remains C/D/I. Urine is cloudy. Zosyn continued, urine culture pending. Pt is obese, declined PT/OT. Stated she has not been able to stand or walk in over a year. Reports living with her sister at home where which they utilize a christina lift for activities. Linen changed today, bed bath given. Bed locked/lowest position, nonskid socks on, call bell within reach. Pt verbalized understanding to call for needs/assistance. Tolerating soft bite-sized diet as she does not have teeth or dentures. Accuchecks are AC/HS.

## 2024-06-13 NOTE — ASSESSMENT & PLAN NOTE
Creatine stable for now. BMP reviewed- noted Estimated Creatinine Clearance: 19.3 mL/min (A) (based on SCr of 3.3 mg/dL (H)). according to latest data. Based on current GFR, CKD stage is end stage.  Monitor UOP and serial BMP and adjust therapy as needed. Renally dose meds. Avoid nephrotoxic medications and procedures.    - ESRD with HD M/F  - renal consulted for HD  -

## 2024-06-13 NOTE — ASSESSMENT & PLAN NOTE
This patient does have evidence of infective focus  My overall impression is sepsis.  Source: Urinary Tract  Antibiotics given-   Antibiotics (72h ago, onward)      Start     Stop Route Frequency Ordered    06/13/24 0400  piperacillin-tazobactam (ZOSYN) 4.5 g in dextrose 5 % in water (D5W) 100 mL IVPB (MB+)         -- IV Every 12 hours (non-standard times) 06/12/24 1913          Latest lactate reviewed-  Recent Labs   Lab 06/12/24  1237   POCLAC 0.86     Organ dysfunction indicated by Acute kidney injury    Fluid challenge Not needed - patient is not hypotensive      Will Not start Pressors- Levophed for MAP of 65  Source control achieved by: abx    Bcx and Ucx in process, UA appears infectious   - prior Ucx pansensitive pseudomonas- cont zosyn pending cultures

## 2024-06-13 NOTE — SUBJECTIVE & OBJECTIVE
Interval History: NAEON. Now Afebrile and without leukocytosis. BP elevated. Na 125; repeat bmp this afternoon. Evaluated by wound care; miconazole ordered for intertrigo. Ordered waffle overlay. Continue IV zosyn while urine cx pending.     Review of Systems   Constitutional:  Negative for chills and fever.   Respiratory:  Negative for chest tightness and shortness of breath.    Cardiovascular:  Positive for leg swelling. Negative for chest pain.   Gastrointestinal:  Negative for abdominal pain and nausea.   Skin:  Positive for rash.   Neurological:  Negative for dizziness and weakness.     Objective:     Vital Signs (Most Recent):  Temp: 99.6 °F (37.6 °C) (06/13/24 1140)  Pulse: 72 (06/13/24 1140)  Resp: 18 (06/13/24 1140)  BP: (!) 150/74 (06/13/24 1140)  SpO2: 98 % (06/13/24 1140) Vital Signs (24h Range):  Temp:  [98.5 °F (36.9 °C)-99.6 °F (37.6 °C)] 99.6 °F (37.6 °C)  Pulse:  [72-88] 72  Resp:  [17-18] 18  SpO2:  [98 %-100 %] 98 %  BP: (145-193)/(70-89) 150/74     Weight: 103.7 kg (228 lb 9.9 oz)  Body mass index is 34.76 kg/m².    Intake/Output Summary (Last 24 hours) at 6/13/2024 1551  Last data filed at 6/13/2024 1510  Gross per 24 hour   Intake 2454.21 ml   Output 1700 ml   Net 754.21 ml         Physical Exam  Vitals and nursing note reviewed.   Constitutional:       Appearance: She is well-developed.   Eyes:      Pupils: Pupils are equal, round, and reactive to light.   Cardiovascular:      Rate and Rhythm: Normal rate and regular rhythm.      Heart sounds: Murmur heard.      Comments: RUE fistula in place with +thrill  Tunneled HD cath to left chest  Pulmonary:      Effort: Pulmonary effort is normal.      Breath sounds: Normal breath sounds.   Abdominal:      Palpations: Abdomen is soft.      Tenderness: There is no abdominal tenderness.   Genitourinary:     Comments: Indwelling cath noted   Musculoskeletal:         General: No tenderness.      Right lower leg: Edema (R>L) present.      Left lower leg:  Edema present.   Skin:     General: Skin is warm and dry.      Comments: Moisture associated dermatitis to buttocks. Intertrigo under breasts    Neurological:      Mental Status: She is alert and oriented to person, place, and time.   Psychiatric:         Behavior: Behavior normal.             Significant Labs: All pertinent labs within the past 24 hours have been reviewed.  BMP:   Recent Labs   Lab 06/13/24  0554      *   K 3.7   CL 99   CO2 18*   BUN 34*   CREATININE 2.9*   CALCIUM 8.5*   MG 1.6     CBC:   Recent Labs   Lab 06/12/24  1235 06/13/24  0554   WBC 16.16* 10.85   HGB 10.3* 9.0*   HCT 32.3* 28.5*    258     Urine Studies:   Recent Labs   Lab 06/12/24  1538   COLORU Yellow   APPEARANCEUA Hazy*   PHUR 6.0   SPECGRAV 1.010   PROTEINUA 2+*   GLUCUA Trace*   KETONESU Negative   BILIRUBINUA Negative   OCCULTUA 2+*   NITRITE Negative   LEUKOCYTESUR 3+*   RBCUA 4   WBCUA >100*   BACTERIA Many*   SQUAMEPITHEL 2   HYALINECASTS 0       Significant Imaging: I have reviewed all pertinent imaging results/findings within the past 24 hours.

## 2024-06-13 NOTE — ASSESSMENT & PLAN NOTE
This patient does have evidence of infective focus  My overall impression is sepsis.  Source: Urinary Tract  Antibiotics given-   Antibiotics (72h ago, onward)      Start     Stop Route Frequency Ordered    06/13/24 0400  piperacillin-tazobactam (ZOSYN) 4.5 g in dextrose 5 % in water (D5W) 100 mL IVPB (MB+)         -- IV Every 12 hours (non-standard times) 06/12/24 1913          Latest lactate reviewed-  Recent Labs   Lab 06/12/24  1237   POCLAC 0.86       Organ dysfunction indicated by  NA    Fluid challenge Not needed - patient is not hypotensive      Will Not start Pressors- Levophed for MAP of 65  Source control achieved by: abx    Bcx and Ucx in process, UA appears infectious   - prior Ucx pansensitive pseudomonas- cont zosyn pending cultures

## 2024-06-13 NOTE — HOSPITAL COURSE
Cecile Bowen is a 72 yo F admitted to hospital medicine for sepsis 2/2 UTI. Started on IV zosyn for UTI. Initially SIRS 4/4, but now afebrile and WBC wnl. Hyponatremia worse than baseline. Started miralax as patient reports only 1-2 BM weekly. One large BM 06/14. Urine cx with no organisms in predominance. Will continue zosyn. Hyponatremia continues to worsen. Nephrology following for HD management; HD performed on 06/14. NA bath adjusted and 1L fluid restriction. Will monitor Na closely. Additional HD performed 06/15. Increasing coreg for better BP control. Hyponatremia asymptomatic. Transitioned to oral cipro on discharge to complete UTI treatment. Started on sodium bicarbonate daily. Plan and medication changes discussed with patient; agreeable to plan. ER precautions were given. All questions were answered to the patient's satisfaction and she was subsequently discharged.

## 2024-06-13 NOTE — ASSESSMENT & PLAN NOTE
Patient with Paroxysmal (<7 days) atrial fibrillation which is controlled currently with Beta Blocker. Patient is currently in sinus rhythm.[id number] Score: 3.  Anticoagulation indicated. Anticoagulation done with eliquis .    - on chart review and per patient report she is only on 5mg daily due to easy bruising

## 2024-06-13 NOTE — ASSESSMENT & PLAN NOTE
Patient's anemia is currently controlled. Has not received any PRBCs to date. Etiology likely d/t chronic disease due to ESRD  Current CBC reviewed-   Lab Results   Component Value Date    HGB 9.0 (L) 06/13/2024    HCT 28.5 (L) 06/13/2024     Monitor serial CBC and transfuse if patient becomes hemodynamically unstable, symptomatic or H/H drops below 7/21.

## 2024-06-13 NOTE — ASSESSMENT & PLAN NOTE
Body mass index is 34.76 kg/m². Morbid obesity complicates all aspects of disease management from diagnostic modalities to treatment. Weight loss encouraged and health benefits explained to patient.

## 2024-06-13 NOTE — SUBJECTIVE & OBJECTIVE
Past Medical History:   Diagnosis Date    Cervicogenic migraine     Chronic pain     CKD (chronic kidney disease) stage 4, GFR 15-29 ml/min     [facility]    CKD (chronic kidney disease) stage 4, GFR 15-29 ml/min     Diabetes mellitus     Long term use of Insulin, Diabetic Neuropathy    Dialysis patient 1/21/2022    Fibromyalgia     Hydronephrosis     Hyperlipidemia     Hypertension 12/12/2012    Hypothyroidism 12/12/2012    ARON (iron deficiency anemia)     Insomnia     Levoscoliosis     Malignant neoplasm of upper-outer quadrant of left breast in female, estrogen receptor positive     Metabolic acidosis     Mobility impaired     Nuclear sclerosis - Both Eyes 3/24/2014    VIJAYA (obstructive sleep apnea)     Osteopenia     Pulmonary nodule     Recurrent UTI     Renal manifestation of secondary diabetes mellitus     Secondary hyperparathyroidism, renal     Urinary retention        Past Surgical History:   Procedure Laterality Date    AV FISTULA PLACEMENT Left 2/14/2024    Procedure: CREATION, AV FISTULA;  Surgeon: RODRIGO Friedman III, MD;  Location: [facility] OR 2ND FLR;  Service: Vascular;  Laterality: Left;  LUE AVF creation vs AVG insertion    BIOPSY OF URETER Left 2/18/2022    Procedure: BIOPSY, URETER;  Surgeon: Ronel Whittington MD;  Location: [facility] OR 1ST FLR;  Service: Urology;  Laterality: Left;    BREAST BIOPSY Right     benign    CHOLECYSTECTOMY      COLONOSCOPY N/A 1/13/2017    Procedure: COLONOSCOPY;  Surgeon: Morris Wiseman MD;  Location: [facility] ENDO (4TH FLR);  Service: Endoscopy;  Laterality: N/A;  Renal pt Nephrology advised to avoid phosphate preps    COLONOSCOPY N/A 12/7/2023    Procedure: COLONOSCOPY;  Surgeon: Herve Allen MD;  Location: [facility] ENDO (2ND FLR);  Service: Endoscopy;  Laterality: N/A;  HD MWF; labs prior  suprapubic catheter  pt does not ambulate-uses christina lift- will have sling with her  9/7 ref. by Anastasiia Campbell, , PEG, instr. mailed, Eliquis hold approval pending-st  ok to  hold Eliquis 2 days per Dr Navarro-GT  1/30-precall complete-MS    CYSTOSCOPY N/A 10/8/2018    Procedure: CYSTOSCOPY;  Surgeon: Ronel Whittington MD;  Location: [facility] OR 1ST FLR;  Service: Urology;  Laterality: N/A;  45 min    CYSTOSCOPY N/A 3/25/2019    Procedure: CYSTOSCOPY;  Surgeon: Ronel Whittington MD;  Location: [facility] OR 1ST FLR;  Service: Urology;  Laterality: N/A;  45 min    CYSTOSCOPY N/A 8/26/2019    Procedure: CYSTOSCOPY;  Surgeon: Ronel Whittington MD;  Location: [facility] OR 1ST FLR;  Service: Urology;  Laterality: N/A;  45 min    CYSTOSCOPY N/A 7/2/2021    Procedure: CYSTOSCOPY;  Surgeon: Ronel Whittington MD;  [facility];  Service: Urology;  Laterality: N/A;    CYSTOSCOPY  12/23/2021    Procedure: CYSTOSCOPY;  Surgeon: Ronel Whittington MD;  [facility];  Service: Urology;;    CYSTOSCOPY  2/18/2022    Procedure: CYSTOSCOPY;  Surgeon: Ronel Whittington MD;  [facility];  Service: Urology;;    CYSTOSCOPY W/ URETERAL STENT PLACEMENT Left 5/15/2021    Procedure: CYSTOSCOPY, WITH URETERAL STENT INSERTION;  Surgeon: Levy Sánchez Jr., MD;  [facility];  Service: Urology;  Laterality: Left;    CYSTOSCOPY WITH BIOPSY OF BLADDER N/A 1/27/2020    Procedure: CYSTOSCOPY, WITH BLADDER BIOPSY, WITH FULGURATION IF INDICATED;  Surgeon: Ronel Whittington MD;  [facility];  Service: Urology;  Laterality: N/A;    DILATION AND CURETTAGE OF UTERUS      FISTULOGRAM N/A 4/29/2024    Procedure: Fistulogram;  Surgeon: RODRIGO Friedman III, MD;  Location: [facility] CATH LAB;  Service: Peripheral Vascular;  Laterality: N/A;    GALLBLADDER SURGERY  2006    HYSTERECTOMY      INJECTION FOR SENTINEL NODE IDENTIFICATION Left 8/1/2019    Procedure: INJECTION, FOR SENTINEL NODE IDENTIFICATION;  Surgeon: Samia Fulton MD;  Location: [facility] OR [address] [place] [address];  Service: General;  Laterality: Left;    INJECTION OF BOTULINUM TOXIN TYPE A N/A  10/8/2018    Procedure: INJECTION, BOTULINUM TOXIN, TYPE A 300 UNITS;  Surgeon: Ronel Whittington MD;  [facility];  Service: Urology;  Laterality: N/A;    INJECTION OF BOTULINUM TOXIN TYPE A N/A 3/25/2019    Procedure: INJECTION, BOTULINUM TOXIN, TYPE A 300 UNITS;  Surgeon: Ronel Whittington MD;  [facility];  Service: Urology;  Laterality: N/A;    INJECTION OF BOTULINUM TOXIN TYPE A N/A 8/26/2019    Procedure: INJECTION, BOTULINUM TOXIN, TYPE A 300 UNITS;  Surgeon: Ronel Whittington MD;  [facility];  Service: Urology;  Laterality: N/A;    MASTECTOMY Left 8/1/2019    Procedure: MASTECTOMY 23 hour stay;  Surgeon: Samia Fulton MD;  Location: [facility] OR 2ND FLR;  Service: General;  Laterality: Left;    MEDIPORT REMOVAL Right 6/24/2022    Procedure: REMOVAL, CATHETER, CENTRAL VENOUS, TUNNELED, WITH PORT;  Surgeon: Isauro Anderson MD;  Location: [facility] CATH LAB;  Service: Radiology;  Laterality: Right;    OVARIAN CYST SURGERY  1985    PERCUTANEOUS TRANSLUMINAL ANGIOPLASTY OF ARTERIOVENOUS FISTULA Left 4/29/2024    Procedure: PTA, AV FISTULA;  Surgeon: RODRIGO Friedman III, MD;  Location: [facility] CATH LAB;  Service: Peripheral Vascular;  Laterality: Left;    REPLACEMENT OF STENT Left 12/23/2021    Procedure: REPLACEMENT, STENT;  Surgeon: Ronel Whittington MD;  Location: [facility] OR [address] [place] [address];  Service: Urology;  Laterality: Left;    REPLACEMENT OF STENT Left 2/18/2022    Procedure: REPLACEMENT, STENT;  Surgeon: Ronel Whittington MD;  [facility];  Service: Urology;  Laterality: Left;    RETROGRADE PYELOGRAPHY Left 2/18/2022    Procedure: PYELOGRAM, RETROGRADE;  Surgeon: Ronel Whittington MD;  [facility];  Service: Urology;  Laterality: Left;    SENTINEL LYMPH NODE BIOPSY Left 8/1/2019    Procedure: BIOPSY, LYMPH NODE, SENTINEL;  Surgeon: Samia Fulton MD;  [facility];  Service: General;  Laterality: Left;    spt  "placement      TONSILLECTOMY, ADENOIDECTOMY      TOTAL ABDOMINAL HYSTERECTOMY W/ BILATERAL SALPINGOOPHORECTOMY  1985    URETEROSCOPY Left 2/18/2022    Procedure: URETEROSCOPY;  Surgeon: Ronel Whittington MD;  Location: [facility] OR [address] [place] [address];  Service: Urology;  Laterality: Left;       Review of patient's allergies indicates:  No Known Allergies    Current Facility-Administered Medications on File Prior to Encounter   Medication    epoetin chloe injection 2,000 Units    heparin (porcine) injection 2,000 Units    heparin (porcine) injection 2,000 Units    heparin (porcine) injection 4,000 Units    iron sucrose injection 100 mg     Current Outpatient Medications on File Prior to Encounter   Medication Sig    amLODIPine (NORVASC) 10 MG tablet Take 10 mg by mouth.    anastrozole (ARIMIDEX) 1 mg Tab TAKE 1 TABLET BY MOUTH EVERY DAY    apixaban (ELIQUIS) 5 mg Tab Take 1 tablet (5 mg total) by mouth Daily.    atorvastatin (LIPITOR) 80 MG tablet Take 1 tablet (80 mg total) by mouth once daily.    BD INSULIN SYRINGE ULTRA-FINE 0.5 mL 31 gauge x 5/16" Syrg USE WITH INSULIN 4 TIMES A DAY    calcium acetate,phosphat bind, (PHOSLO) 667 mg capsule Take 667 mg by mouth 3 (three) times daily with meals.    carvediloL (COREG) 6.25 MG tablet Take 1 tablet (6.25 mg total) by mouth 2 (two) times daily.    ceFAZolin 2 gram SolR     DIPRIVAN 10 mg/mL infusion     DULoxetine (CYMBALTA) 20 MG capsule Take 2 capsules (40 mg total) by mouth once daily.    erenumab-aooe (AIMOVIG AUTOINJECTOR) 140 mg/mL autoinjector 140 mg MONTHLY (route: subcutaneous)    ergocalciferol (ERGOCALCIFEROL) 50,000 unit Cap Take 1 capsule (50,000 Units total) by mouth every 7 days.    fentaNYL (SUBLIMAZE) 50 mcg/mL injection     furosemide (LASIX) 40 MG tablet Take 1 tablet (40 mg total) by mouth once daily.    heparin sodium,porcine (HEPARIN, PORCINE,) 1,000 unit/mL injection     HYDROcodone-acetaminophen (NORCO)  mg per tablet Take 1 tablet by mouth every 6 " "(six) hours as needed for Pain.    LANTUS SOLOSTAR U-100 INSULIN 100 unit/mL (3 mL) InPn pen INJECT 12-15 UNITS UNDER THE SKIN at night    losartan (COZAAR) 100 MG tablet Take 1 tablet (100 mg total) by mouth once daily.    losartan (COZAAR) 50 MG tablet Take 50 mg by mouth.    methocarbamoL (ROBAXIN) 750 MG Tab TAKE 1-2 TABLETS (750-1,500 MG TOTAL) BY MOUTH 3 (THREE) TIMES DAILY AS NEEDED (MUSCLE SPASMS).    midazolam (VERSED) 1 mg/mL injection     ondansetron (ZOFRAN-ODT) 8 MG TbDL 8 mg EVERY 12 HOURS (route: oral)    oxybutynin (DITROPAN-XL) 10 MG 24 hr tablet TAKE 1 TABLET BY MOUTH EVERY DAY    oxyCODONE (ROXICODONE) 5 MG immediate release tablet Take 1 tablet (5 mg total) by mouth daily as needed (severe pain).    pen needle, diabetic (BD ULTRA-FINE SHORT PEN NEEDLE) 31 gauge x 5/16" Ndle USE WITH LANTUS DAILY, e 11.65    PHENYLephrine 10 mg/mL injection     POLOCAINE-MPF 15 mg/mL (1.5 %) injection     sumatriptan (IMITREX) 100 MG tablet Take 1 tablet (100 mg total) by mouth 2 (two) times daily as needed for Migraine (Do not take more than 2 in 24 hours or 3 in a week).    SYNTHROID 50 mcg tablet TAKE 1 TABLET BY MOUTH BEFORE BREAKFAST. ADMINISTER ON AN EMPTY STOMACH AT LEAST 30 MINUTES BEFORE FOOD. IF RECEIVING TUBE FEEDS, HOLD TUBE FEEDS FOR 1 HOUR BEFORE AND AFTER LEVOTHYROXINE ADMINISTRATION.    tenapanor 30 mg Tab Take 30 mg by mouth 2 (two) times a day. Take 1 tablet in the morning with meal and 1 tablet in the afternoon with meal    traZODone (DESYREL) 100 MG tablet TAKE 1 TABLET BY MOUTH NIGHTLY AS NEEDED FOR INSOMNIA.    VELPHORO 500 mg Chew CHEW INSERT TABLET 5 TIMES DAILY WITH MEAL AND SNACK     Family History       Problem Relation (Age of Onset)    ALS Mother    Cancer Maternal Grandmother, Paternal Grandfather, Brother    Diabetes Sister, Maternal Aunt, Maternal Uncle    Kidney disease Sister, Maternal Aunt    Prostate cancer Brother    Scoliosis Brother          Tobacco Use    Smoking status: " Never    Smokeless tobacco: Never   Substance and Sexual Activity    Alcohol use: No     Alcohol/week: 0.0 standard drinks of alcohol    Drug use: No    Sexual activity: Not Currently     Birth control/protection: Abstinence     Review of Systems   Constitutional:  Positive for fever. Negative for chills.   HENT:  Negative for trouble swallowing.    Respiratory:  Negative for shortness of breath.    Cardiovascular:  Positive for leg swelling. Negative for chest pain.   Gastrointestinal:  Negative for abdominal pain, nausea and vomiting.   Genitourinary:  Negative for dysuria and flank pain.   Musculoskeletal:  Positive for back pain.   Skin:  Negative for wound.   Neurological:  Negative for seizures.   Psychiatric/Behavioral:  Negative for confusion.      Objective:     Vital Signs (Most Recent):  Temp: 98.2 °F (36.8 °C) (06/12/24 1534)  Pulse: 98 (06/12/24 1534)  Resp: 16 (06/12/24 1534)  BP: (!) 174/78 (06/12/24 1534)  SpO2: 95 % (06/12/24 1534) Vital Signs (24h Range):  Temp:  [98.2 °F (36.8 °C)-101.5 °F (38.6 °C)] 98.2 °F (36.8 °C)  Pulse:  [] 98  Resp:  [16-24] 16  SpO2:  [95 %-96 %] 95 %  BP: (174-182)/(78-84) 174/78     Weight: 99.8 kg (220 lb)  Body mass index is 33.45 kg/m².     Physical Exam  Vitals and nursing note reviewed.   Constitutional:       General: She is not in acute distress.     Appearance: Normal appearance.   HENT:      Head: Normocephalic and atraumatic.      Nose: Nose normal.      Mouth/Throat:      Mouth: Mucous membranes are moist.      Pharynx: Oropharynx is clear.   Eyes:      Extraocular Movements: Extraocular movements intact.      Conjunctiva/sclera: Conjunctivae normal.      Pupils: Pupils are equal, round, and reactive to light.   Cardiovascular:      Rate and Rhythm: Normal rate and regular rhythm.      Pulses: Normal pulses.      Heart sounds: Normal heart sounds.      Comments: RUE fistula in place with +thrill  Tunneled HD cath to left chest  Pulmonary:      Effort:  Pulmonary effort is normal. No respiratory distress.      Breath sounds: Normal breath sounds. No wheezing.   Abdominal:      General: Abdomen is flat. Bowel sounds are normal. There is no distension.      Palpations: Abdomen is soft.      Tenderness: There is no abdominal tenderness.      Comments: Chronic indwelling cath in place, foul smelling, urine clear   Musculoskeletal:         General: Normal range of motion.      Cervical back: Normal range of motion and neck supple.      Right lower leg: Edema present.      Left lower leg: Edema (L>R, chronic) present.   Skin:     General: Skin is warm and dry.   Neurological:      General: No focal deficit present.      Mental Status: She is alert and oriented to person, place, and time.   Psychiatric:         Mood and Affect: Mood normal.         Behavior: Behavior normal.              CRANIAL NERVES     CN III, IV, VI   Pupils are equal, round, and reactive to light.       Significant Labs: All pertinent labs within the past 24 hours have been reviewed.    Significant Imaging: I have reviewed all pertinent imaging results/findings within the past 24 hours.

## 2024-06-13 NOTE — PROGRESS NOTES
[facility] - Transplant [facility] Medicine  Progress Note    Patient Name: Cecile Bowen  MRN: [id number]  Patient Class: IP- Inpatient   Admission Date: 6/12/2024  Length of Stay: 1 days  Attending Physician: Zoë Roblero MD  Primary Care Provider: Lurdes Navarro MD        Subjective:     Principal Problem:Sepsis        HPI:  73-year-old female with past medical history of  ESRD on HD M/F, hypertension, hyperlipidemia, ARON, breast CA, chronic indwelling urinary catheter who presents ED for concern for UTI. HH nurse was seeing patient and concerned for cloudy urine, murillo exchanged today. Patient reports she had no symptoms at home, denies fever, abd pain, N/V. Does have chronic back pain.    ED reports patient from SNF, however patient confirmed she was at home being seen by HH.   Febrile in the ED, UA appearing infectious, Cultures drawn and started on broad spectrum abx.     Overview/Hospital Course:  Cecile Bowen is a 72 yo F admitted to hospital medicine for sepsis 2/2 UTI. Started on IV zosyn for UTI. Following urine cultures.  Initially SIRS 4/4, but now afebrile and WBC wnl. Hyponatremia worse than baseline. Nephrology following for HD management; plan for HD on 06/14. Started miralax as patient reports only 1-2 BM weekly. Plan to discharge home with HH when medically ready.     Interval History: NAEON. Now Afebrile and without leukocytosis. BP elevated. Na 125; repeat bmp this afternoon. Evaluated by wound care; miconazole ordered for intertrigo. Ordered waffle overlay. Continue IV zosyn while urine cx pending.     Review of Systems   Constitutional:  Negative for chills and fever.   Respiratory:  Negative for chest tightness and shortness of breath.    Cardiovascular:  Positive for leg swelling. Negative for chest pain.   Gastrointestinal:  Negative for abdominal pain and nausea.   Skin:  Positive for rash.   Neurological:  Negative for dizziness and weakness.     Objective:      Vital Signs (Most Recent):  Temp: 99.6 °F (37.6 °C) (06/13/24 1140)  Pulse: 72 (06/13/24 1140)  Resp: 18 (06/13/24 1140)  BP: (!) 150/74 (06/13/24 1140)  SpO2: 98 % (06/13/24 1140) Vital Signs (24h Range):  Temp:  [98.5 °F (36.9 °C)-99.6 °F (37.6 °C)] 99.6 °F (37.6 °C)  Pulse:  [72-88] 72  Resp:  [17-18] 18  SpO2:  [98 %-100 %] 98 %  BP: (145-193)/(70-89) 150/74     Weight: 103.7 kg (228 lb 9.9 oz)  Body mass index is 34.76 kg/m².    Intake/Output Summary (Last 24 hours) at 6/13/2024 1551  Last data filed at 6/13/2024 1510  Gross per 24 hour   Intake 2454.21 ml   Output 1700 ml   Net 754.21 ml         Physical Exam  Vitals and nursing note reviewed.   Constitutional:       Appearance: She is well-developed.   Eyes:      Pupils: Pupils are equal, round, and reactive to light.   Cardiovascular:      Rate and Rhythm: Normal rate and regular rhythm.      Heart sounds: Murmur heard.      Comments: RUE fistula in place with +thrill  Tunneled HD cath to left chest  Pulmonary:      Effort: Pulmonary effort is normal.      Breath sounds: Normal breath sounds.   Abdominal:      Palpations: Abdomen is soft.      Tenderness: There is no abdominal tenderness.   Genitourinary:     Comments: Indwelling cath noted   Musculoskeletal:         General: No tenderness.      Right lower leg: Edema (R>L) present.      Left lower leg: Edema present.   Skin:     General: Skin is warm and dry.      Comments: Moisture associated dermatitis to buttocks. Intertrigo under breasts    Neurological:      Mental Status: She is alert and oriented to person, place, and time.   Psychiatric:         Behavior: Behavior normal.             Significant Labs: All pertinent labs within the past 24 hours have been reviewed.  BMP:   Recent Labs   Lab 06/13/24  0554      *   K 3.7   CL 99   CO2 18*   BUN 34*   CREATININE 2.9*   CALCIUM 8.5*   MG 1.6     CBC:   Recent Labs   Lab 06/12/24  1235 06/13/24  0554   WBC 16.16* 10.85   HGB 10.3* 9.0*    HCT 32.3* 28.5*    258     Urine Studies:   Recent Labs   Lab 06/12/24  1538   COLORU Yellow   APPEARANCEUA Hazy*   PHUR 6.0   SPECGRAV 1.010   PROTEINUA 2+*   GLUCUA Trace*   KETONESU Negative   BILIRUBINUA Negative   OCCULTUA 2+*   NITRITE Negative   LEUKOCYTESUR 3+*   RBCUA 4   WBCUA >100*   BACTERIA Many*   SQUAMEPITHEL 2   HYALINECASTS 0       Significant Imaging: I have reviewed all pertinent imaging results/findings within the past 24 hours.    Assessment/Plan:      * Sepsis  This patient does have evidence of infective focus  My overall impression is sepsis.  Source: Urinary Tract  Antibiotics given-   Antibiotics (72h ago, onward)      Start     Stop Route Frequency Ordered    06/13/24 0400  piperacillin-tazobactam (ZOSYN) 4.5 g in dextrose 5 % in water (D5W) 100 mL IVPB (MB+)         -- IV Every 12 hours (non-standard times) 06/12/24 1913          Latest lactate reviewed-  Recent Labs   Lab 06/12/24  1237   POCLAC 0.86       Organ dysfunction indicated by  NA    Fluid challenge Not needed - patient is not hypotensive      Will Not start Pressors- Levophed for MAP of 65  Source control achieved by: abx    Bcx and Ucx in process, UA appears infectious   - prior Ucx pansensitive pseudomonas- cont zosyn pending cultures     HTN (hypertension)  Chronic, controlled. Latest blood pressure and vitals reviewed-     Temp:  [98.5 °F (36.9 °C)-99.6 °F (37.6 °C)]   Pulse:  [72-88]   Resp:  [17-18]   BP: (145-193)/(70-89)   SpO2:  [98 %-100 %] .   Home meds for hypertension were reviewed and noted below.   Hypertension Medications               amLODIPine (NORVASC) 10 MG tablet Take 10 mg by mouth.    carvediloL (COREG) 6.25 MG tablet Take 1 tablet (6.25 mg total) by mouth 2 (two) times daily.    furosemide (LASIX) 40 MG tablet Take 1 tablet (40 mg total) by mouth once daily.    losartan (COZAAR) 100 MG tablet Take 1 tablet (100 mg total) by mouth once daily.                 While in the hospital, will manage  blood pressure as follows; Continue home antihypertensive regimen-- IF BP remains elevated, will increase home coreg dose     Will utilize p.r.n. blood pressure medication only if patient's blood pressure greater than 180/110 and she develops symptoms such as worsening chest pain or shortness of breath.  - PRN hydralazine and volume removal with HD    ESRD (end stage renal disease) on dialysis  Creatine stable for now. BMP reviewed- noted Estimated Creatinine Clearance: 22.4 mL/min (A) (based on SCr of 2.9 mg/dL (H)). according to latest data. Based on current GFR, CKD stage is end stage.  Monitor UOP and serial BMP and adjust therapy as needed. Renally dose meds. Avoid nephrotoxic medications and procedures.    - ESRD on HD M/F  - renal consulted for HD:  - Continue MF iHD schedule   - Plan for next HD 6/14   - continue lasix   - Hyponatremia in setting of ESRD - 1L fluid restriction   - resume home phos binders   - does have RUE fistula, but currently dialyzing through tunneled HD cath    Recurrent urinary tract infection  - history of recurrent UTI with indwelling cath in place, changed 6/12  - prior Ucx pansensitive Pseudomonas   - Ucx in process, cont zosyn for now    Type 2 diabetes mellitus with kidney complication, with long-term current use of insulin  Patient's FSGs are uncontrolled due to hyperglycemia on current medication regimen.  Last A1c reviewed-   Lab Results   Component Value Date    HGBA1C 6.2 (H) 04/22/2024     Most recent fingerstick glucose reviewed-   Recent Labs   Lab 06/12/24  1922 06/12/24  2134 06/13/24  0742 06/13/24  1210   POCTGLUCOSE 251* 228* 117* 144*       Current correctional scale  Low  Maintain anti-hyperglycemic dose as follows-   Antihyperglycemics (From admission, onward)      Start     Stop Route Frequency Ordered    06/12/24 2100  insulin glargine U-100 (Lantus) pen 10 Units         -- SubQ Nightly 06/1950 06/12/24 1846  insulin aspart U-100 pen 0-5 Units          -- SubQ Before meals & nightly PRN 06/12/24 4167          Hold Oral hypoglycemics while patient is in the hospital.    Hyponatremia  Patient has hyponatremia which is uncontrolled,We will aim to correct the sodium by 4-6mEq in 24 hours. We will monitor sodium Daily. The hyponatremia is due to renal insufficiency. We will obtain the following studies: Urine sodium, urine osmolality, serum osmolality or TSH, T4. We will treat the hyponatremia with Hemodialysis and 1L fluid restriction. The patient's sodium results have been reviewed and are listed below.  Recent Labs   Lab 06/13/24  0554   *         Unspecified atrial fibrillation  Patient with Paroxysmal (<7 days) atrial fibrillation which is controlled currently with Beta Blocker. Patient is currently in sinus rhythm.[id number] Score: 3.  Anticoagulation indicated. Anticoagulation done with eliquis .    - on chart review and per patient report she is only on 5mg daily due to easy bruising    Malignant neoplasm of left breast, estrogen receptor positive  - home med anastrozole  - f/u HO    Class 1 obesity with serious comorbidity and body mass index (BMI) of 33.0 to 33.9 in adult  Body mass index is 34.76 kg/m². Morbid obesity complicates all aspects of disease management from diagnostic modalities to treatment. Weight loss encouraged and health benefits explained to patient.     Neurogenic bladder  - cont home med oxybutynin  - indwelling cath in place, changed day of admission 6/12     Iron deficiency anemia, unspecified  Patient's anemia is currently controlled. Has not received any PRBCs to date. Etiology likely d/t chronic disease due to ESRD  Current CBC reviewed-   Lab Results   Component Value Date    HGB 9.0 (L) 06/13/2024    HCT 28.5 (L) 06/13/2024     Monitor serial CBC and transfuse if patient becomes hemodynamically unstable, symptomatic or H/H drops below 7/21.    Hyperlipidemia associated with type 2 diabetes mellitus  - continue  statin    Hypothyroidism  - continue synthroid      VTE Risk Mitigation (From admission, onward)           Ordered     apixaban tablet 5 mg  Daily         06/1952                    Discharge Planning   FLORENTINO: 6/14/2024     Code Status: Full Code   Is the patient medically ready for discharge?:     Reason for patient still in hospital (select all that apply): Patient trending condition, Treatment, and Other (specify) urine culture  Discharge Plan A: Home, Home with family, Home Health                  Johanna Alberto PA-C  Department of Hospital Medicine   [facility] - Transplant Stepdown

## 2024-06-13 NOTE — HPI
73-year-old female with past medical history of  ESRD on HD M/F, hypertension, hyperlipidemia, ARON, breast CA, chronic indwelling urinary catheter who presents ED for concern for UTI. HH nurse was seeing patient and concerned for cloudy urine, murillo exchanged today. Patient reports she had no symptoms at home, denies fever, abd pain, N/V. Does have chronic back pain.    ED reports patient from SNF, however patient confirmed she was at home being seen by HH.   Febrile in the ED, UA appearing infectious, Cultures drawn and started on broad spectrum abx.

## 2024-06-13 NOTE — PROGRESS NOTES
Pharmacist Renal Dose Adjustment Note    Cecile Bowen is a 73 y.o. female being treated with the medication Zosyn.    Patient Data:    Vital Signs (Most Recent):  Temp: 98.2 °F (36.8 °C) (06/12/24 1534)  Pulse: 98 (06/12/24 1534)  Resp: 16 (06/12/24 1534)  BP: (!) 174/78 (06/12/24 1534)  SpO2: 95 % (06/12/24 1534) Vital Signs (72h Range):  Temp:  [98.2 °F (36.8 °C)-101.5 °F (38.6 °C)]   Pulse:  []   Resp:  [16-24]   BP: (170-182)/(73-84)   SpO2:  [95 %-96 %]      Recent Labs   Lab 06/07/24  0000 06/12/24  1235   CREATININE 3.04* 3.3*     Serum creatinine: 3.3 mg/dL (H) 06/12/24 1235  Estimated creatinine clearance: 19.3 mL/min (A)    Zosyn 4.5 g IV Q8H decreased frequency to 4.5 g IV Q12H given ESRD on dialysis Mondays and Fridays.  Will continue to monitor and adjust dose as needed.    Pharmacist's Name: Liban HuffD  Pharmacist's Extension: [phone]

## 2024-06-13 NOTE — ASSESSMENT & PLAN NOTE
Chronic, controlled. Latest blood pressure and vitals reviewed-     Temp:  [98.5 °F (36.9 °C)-99.6 °F (37.6 °C)]   Pulse:  [72-88]   Resp:  [17-18]   BP: (145-193)/(70-89)   SpO2:  [98 %-100 %] .   Home meds for hypertension were reviewed and noted below.   Hypertension Medications               amLODIPine (NORVASC) 10 MG tablet Take 10 mg by mouth.    carvediloL (COREG) 6.25 MG tablet Take 1 tablet (6.25 mg total) by mouth 2 (two) times daily.    furosemide (LASIX) 40 MG tablet Take 1 tablet (40 mg total) by mouth once daily.    losartan (COZAAR) 100 MG tablet Take 1 tablet (100 mg total) by mouth once daily.                 While in the hospital, will manage blood pressure as follows; Continue home antihypertensive regimen-- IF BP remains elevated, will increase home coreg dose     Will utilize p.r.n. blood pressure medication only if patient's blood pressure greater than 180/110 and she develops symptoms such as worsening chest pain or shortness of breath.  - PRN hydralazine and volume removal with HD

## 2024-06-13 NOTE — ASSESSMENT & PLAN NOTE
Patient's anemia is currently controlled. Has not received any PRBCs to date. Etiology likely d/t chronic disease due to ESRD  Current CBC reviewed-   Lab Results   Component Value Date    HGB 10.3 (L) 06/12/2024    HCT 32.3 (L) 06/12/2024     Monitor serial CBC and transfuse if patient becomes hemodynamically unstable, symptomatic or H/H drops below 7/21.

## 2024-06-13 NOTE — CONSULTS
[facility] - Transplant Stepdown  Wound Care    Patient Name:  Cecile Bowen   MRN:  [id number]  Date: 6/13/2024  Diagnosis: Sepsis    History:     Past Medical History:   Diagnosis Date    Cervicogenic migraine     Chronic pain     CKD (chronic kidney disease) stage 4, GFR 15-29 ml/min     [facility]    CKD (chronic kidney disease) stage 4, GFR 15-29 ml/min     Diabetes mellitus     Long term use of Insulin, Diabetic Neuropathy    Dialysis patient 1/21/2022    Fibromyalgia     Hydronephrosis     Hyperlipidemia     Hypertension 12/12/2012    Hypothyroidism 12/12/2012    ARON (iron deficiency anemia)     Insomnia     Levoscoliosis     Malignant neoplasm of upper-outer quadrant of left breast in female, estrogen receptor positive     Metabolic acidosis     Mobility impaired     Nuclear sclerosis - Both Eyes 3/24/2014    VIJAYA (obstructive sleep apnea)     Osteopenia     Pulmonary nodule     Recurrent UTI     Renal manifestation of secondary diabetes mellitus     Secondary hyperparathyroidism, renal     Urinary retention        Social History     Socioeconomic History    Marital status:     Number of children: 0    Highest education level: Master's degree (e.g., MA, MS, Lelo, MEd, MSW, BANDAR)   Occupational History    Occupation: teacher     Comment: retired   Tobacco Use    Smoking status: Never    Smokeless tobacco: Never   Substance and Sexual Activity    Alcohol use: No     Alcohol/week: 0.0 standard drinks of alcohol    Drug use: No    Sexual activity: Not Currently     Birth control/protection: Abstinence   Social History Narrative    Single ()             Brother passed away last year.  Pt lives her her sister. (5/27)     Social Determinants of Health     Financial Resource Strain: Patient Declined (6/13/2024)    Overall Financial Resource Strain (CARDIA)     Difficulty of Paying Living Expenses: Patient declined   Food Insecurity: Patient Declined (6/13/2024)    Hunger Vital Sign     Worried About  Running Out of Food in the Last Year: Patient declined     Ran Out of Food in the Last Year: Patient declined   Transportation Needs: Patient Declined (6/13/2024)    TRANSPORTATION NEEDS     Transportation : Patient declined   Physical Activity: Patient Declined (6/13/2024)    Exercise Vital Sign     Days of Exercise per Week: Patient declined     Minutes of Exercise per Session: Patient declined   Stress: Patient Declined (6/13/2024)    [employer] [place] [employer] [employer] - Occupational Stress Questionnaire     Feeling of Stress : Patient declined   Housing Stability: Patient Declined (6/13/2024)    Housing Stability Vital Sign     Unable to Pay for Housing in the Last Year: Patient declined     Homeless in the Last Year: Patient declined       Precautions:     Allergies as of 06/12/2024    (No Known Allergies)       WOC Assessment Details/Treatment     Wound consult received on patient.  Moisture associated dermatitis noted to buttocks and bilateral axilla. Recommend miconazole powder twice a day and as needed. Continue moisture management per nursing.        06/13/24 1230        Wound 06/13/24 0000 Moisture associated dermatitis Buttocks   Date First Assessed/Time First Assessed: 06/13/24 0000   Present on Original Admission: Yes  Primary Wound Type: Moisture associated dermatitis  Location: Buttocks   Drainage Amount None   Drainage Characteristics/Odor No odor   Appearance Red;Moist   Tissue loss description Partial thickness   Periwound Area Excoriated;Satellite lesion   Wound Edges Undefined   Wound Length (cm)   (unclear margins bilataral buttocks and thighs)   Wound Width (cm)   (unclear margins bilateral buttocks and thighs)        Wound 06/13/24 1230 Intertrigo Left Axilla   Date First Assessed/Time First Assessed: 06/13/24 1230   Present on Original Admission: Yes  Primary Wound Type: Intertrigo  Side: Left  Location: Axilla   Drainage Amount None   Drainage Characteristics/Odor No odor   Appearance  Moist;Red   Tissue loss description Partial thickness   Periwound Area Moist   Wound Edges Undefined        Wound 06/13/24 1230 Moisture associated dermatitis Right Axilla   Date First Assessed/Time First Assessed: 06/13/24 1230   Present on Original Admission: Yes  Primary Wound Type: Moisture associated dermatitis  Side: Right  Location: Axilla   Drainage Amount None   Drainage Characteristics/Odor No odor   Appearance Red   Periwound Area Moist   Wound Edges Undefined

## 2024-06-13 NOTE — HPI
73-year-old female with past medical history of  ESRD on HD M/F, hypertension, hyperlipidemia, ARON, breast CA, chronic indwelling urinary catheter who presents ED for concern for UTI. HH nurse was seeing patient and concerned for cloudy urine, murillo exchanged today. End stage renal disease secondary to HTN and DM2 suspected. Was previously on dialysis and it was stopped for many months and then restarted per patient ~ 10/23. Last HD prior to presentation was on 6/10. Nephrology consulted for ESRD.

## 2024-06-13 NOTE — PLAN OF CARE
[facility] - Transplant Stepdown      HOME HEALTH ORDERS  FACE TO FACE ENCOUNTER    Patient Name: Cecile Bowen  YOB: 1950    PCP: Lurdes Navarro MD   PCP Address: [address] [facility] / [place] [address]  PCP Phone Number: [phone]  PCP Fax: [phone]    Encounter Date: 6/12/24    Admit to Home Health    Diagnoses:  Active Hospital Problems    Diagnosis  POA    *Sepsis [A41.9]  Yes    HTN (hypertension) [I10]  Yes     Priority: 3     ESRD (end stage renal disease) on dialysis [N18.6, Z99.2]  Not Applicable     Priority: 3     Recurrent urinary tract infection [N39.0]  Yes     Priority: 3     Type 2 diabetes mellitus with kidney complication, with long-term current use of insulin [E11.29, Z79.4]  Not Applicable     Priority: 4     Hyponatremia [E87.1]  Yes     Priority: 4      Chronic    Unspecified atrial fibrillation [I48.91]  Yes    Malignant neoplasm of left breast, estrogen receptor positive [C50.912, Z17.0]  Not Applicable     Chronic    Class 1 obesity with serious comorbidity and body mass index (BMI) of 33.0 to 33.9 in adult [E66.9, Z68.33]  Not Applicable    Neurogenic bladder [N31.9]  Yes     Chronic    Metabolic acidosis [E87.20]  Yes    Iron deficiency anemia, unspecified [D50.9]  Yes    Hyperlipidemia associated with type 2 diabetes mellitus [E11.69, E78.5]  Yes     Chronic    Hypothyroidism [E03.9]  Yes     Chronic      Resolved Hospital Problems    Diagnosis Date Resolved POA    Hypertension associated with diabetes [E11.59, I15.2] 06/12/2024 Yes     Chronic    Type 2 diabetes mellitus with chronic kidney disease on chronic dialysis, with long-term current use of insulin [E11.22, N18.6, Z99.2, Z79.4] 06/12/2024 Not Applicable       Follow Up Appointments:  Future Appointments   Date Time Provider Department Center   6/17/2024  1:00 PM CHAIR 30, [facility] KIDNEY CARE [facility]   6/21/2024  1:00 PM CHAIR 30, [facility] KIDNEY CARE [facility] [place]    6/24/2024  1:00 PM CHAIR 30, [facility] KIDNEY CARE [facility] [place]   6/25/2024  2:20 PM Tesha Stafford MD [facility] PHYSMED [address]   6/28/2024  1:00 PM CHAIR 30, [facility] KIDNEY CARE [facility] [place]   7/1/2024  1:00 PM CHAIR 30, [facility] KIDNEY [facility] [place]   7/2/2024  1:00 PM [facility] TANSEY MAMMO2 [facility] MAMMO [address]   7/5/2024  1:00 PM CHAIR 30, [facility] KIDNEY [facility] [place]   7/8/2024  1:00 PM CHAIR 30, [facility] KIDNEY [facility] [place]   7/9/2024  2:30 PM RODRIGO Friedman III, MD [facility] VASCSUR [address]   7/12/2024  1:00 PM CHAIR 30, [facility] KIDNEY [facility] [place]   7/15/2024  1:00 PM CHAIR 30, [facility] KIDNEY [facility] [place]   7/19/2024  1:00 PM CHAIR 30, [facility] KIDNEY [facility] [place]   7/22/2024  1:00 PM CHAIR 30, [facility] KIDNEY [facility] [place]   7/23/2024 10:00 AM LAB, TRANSPLANT [facility] LABTX [address]   7/23/2024 11:00 AM [facility] HEPATOLOGY, FIBROSCAN [facility] HEPAT [address]   7/23/2024 11:30 AM Maci Blue, NP [facility] HEPAT [address]   7/26/2024  1:00 PM CHAIR 30, [facility] KIDNEY [facility] [place]   7/29/2024  1:00 PM CHAIR 30, [facility] KIDNEY [facility] [place]   8/2/2024  1:00 PM CHAIR 30, [facility] KIDNEY [facility] [place]   8/5/2024  1:00 PM CHAIR 30, [facility] KIDNEY [facility] [place]   8/7/2024 10:00 AM Ravi Kearns, OD [facility] OPTOMTY [place]   8/9/2024  1:00 PM CHAIR 30, [facility] [place]   8/12/2024  1:00 PM CHAIR 30, [facility]ney [place]   8/13/2024  2:00 PM Ada Soni, VANCE [facility] POD [address]   8/16/2024  1:00 PM CHAIR 30, [facility]ney [place]   8/19/2024  1:00 PM CHAIR 30, [facility] [place]   8/23/2024  1:00 PM CHAIR 30, [facility]ney [place]   8/26/2024  1:00 PM CHAIR 30, [facility] [place]   8/30/2024  1:00 PM CHAIR 30, [facility] Kidney  [place]   9/25/2024  2:45 PM Ravi Kearns OD [facility] OPTOMTY [place]   10/2/2024 11:00 AM Lurdes Navarro MD [facility] IM [place]       Allergies:Review of patient's allergies indicates:  No Known Allergies    Medications: Review discharge medications with patient and family and provide education.    Current Facility-Administered Medications   Medication Dose Route Frequency Provider Last Rate Last Admin    0.9%  NaCl infusion   Intravenous Once Lamar Negro  mL/hr at 06/15/24 1403 New Bag at 06/15/24 1403    acetaminophen tablet 1,000 mg  1,000 mg Oral Q8H PRN Nallely Newby MD   1,000 mg at 06/15/24 1422    acetaminophen tablet 650 mg  650 mg Oral Q4H PRN Nallely Newby MD        albuterol-ipratropium 2.5 mg-0.5 mg/3 mL nebulizer solution 3 mL  3 mL Nebulization Q4H PRN Nallely Newby MD        aluminum-magnesium hydroxide-simethicone 200-200-20 mg/5 mL suspension 30 mL  30 mL Oral QID PRN Nallely Newby MD        amLODIPine tablet 10 mg  10 mg Oral Daily Nallely Newby MD   10 mg at 06/15/24 0830    anastrozole tablet 1 mg  1 mg Oral Daily Nallely Newby MD   1 mg at 06/14/24 1541    apixaban tablet 5 mg  5 mg Oral Daily Nallely Newby MD   5 mg at 06/14/24 1640    atorvastatin tablet 80 mg  80 mg Oral Daily Nallely Newby MD   80 mg at 06/15/24 0831    bisacodyL suppository 10 mg  10 mg Rectal Daily PRN Nallely Newby MD        calcium acetate(phosphat bind) capsule 667 mg  667 mg Oral TID WM Nallely Newby MD   667 mg at 06/15/24 1232    carvediloL tablet 12.5 mg  12.5 mg Oral BID Johanna Alberto PA-C   12.5 mg at 06/15/24 0831    dextrose 10% bolus 125 mL 125 mL  12.5 g Intravenous PRN Nallely Newby MD        dextrose 10% bolus 250 mL 250 mL  25 g Intravenous PRN Nallely Newby MD        DULoxetine DR capsule 40 mg  40 mg Oral Daily Nallely Newby MD   40 mg at 06/14/24 4150    furosemide tablet 40 mg  40 mg Oral Daily Nallely Newby MD   40 mg at 06/15/24  0831    glucagon (human recombinant) injection 1 mg  1 mg Intramuscular PRN Nallely Newby MD        glucose chewable tablet 16 g  16 g Oral PRN Nallely Newby MD        glucose chewable tablet 24 g  24 g Oral PRN Nallely Newby MD        hydrALAZINE tablet 25 mg  25 mg Oral Q8H PRN Nallely Newby MD        HYDROcodone-acetaminophen  mg per tablet 1 tablet  1 tablet Oral Q6H PRN Nallely Newby MD   1 tablet at 06/15/24 1010    insulin aspart U-100 pen 0-5 Units  0-5 Units Subcutaneous QID (AC + HS) PRN Nallely Newby MD        insulin glargine U-100 (Lantus) pen 10 Units  10 Units Subcutaneous QHS Nallely Newby MD   10 Units at 06/14/24 2026    levothyroxine tablet 50 mcg  50 mcg Oral Before breakfast Nallely Newby MD   50 mcg at 06/15/24 0620    losartan tablet 100 mg  100 mg Oral Daily Nallely Newby MD   100 mg at 06/15/24 0831    methocarbamoL tablet 500 mg  500 mg Oral QID Nallely Newby MD   500 mg at 06/15/24 1010    miconazole NITRATE 2 % top powder   Topical (Top) BID Johanna Alberto PA-C   Given at 06/15/24 0831    naloxone 0.4 mg/mL injection 0.02 mg  0.02 mg Intravenous PRN Nallely Newby MD        ondansetron disintegrating tablet 8 mg  8 mg Oral Q8H PRN Nallely Newby MD   8 mg at 06/14/24 0821    oxybutynin 24 hr tablet 10 mg  10 mg Oral Daily Nallely Newby MD   10 mg at 06/14/24 1541    piperacillin-tazobactam (ZOSYN) 4.5 g in dextrose 5 % in water (D5W) 100 mL IVPB (MB+)  4.5 g Intravenous Q12H Nallely Newby MD   Stopped at 06/15/24 0859    polyethylene glycol packet 17 g  17 g Oral Daily Johanna Alberto PA-C   17 g at 06/13/24 1215    prochlorperazine injection Soln 5 mg  5 mg Intravenous Q6H PRN Nallely Newby MD        simethicone chewable tablet 80 mg  1 tablet Oral QID PRN Nallely Newby MD        sodium bicarbonate tablet 650 mg  650 mg Oral Daily Johanna Alberto PA-C   650 mg at 06/15/24 0831    sodium chloride 0.9% flush 5 mL  5 mL Intravenous  Nallely Beebe MD        sumatriptan tablet 100 mg  100 mg Oral BID Nallely Beebe MD   100 mg at 06/14/24 1640    traZODone tablet 100 mg  100 mg Oral Nightly Nallely Beebe MD   100 mg at 06/14/24 2027     Facility-Administered Medications Ordered in Other Encounters   Medication Dose Route Frequency Provider Last Rate Last Admin    epoetin chloe injection 2,000 Units  2,000 Units Intravenous 1 time in Clinic/HOD Emmanuel Hare Jr., MD        heparin (porcine) injection 2,000 Units  2,000 Units Intravenous Once Emmanuel Hare Jr., MD        heparin (porcine) injection 2,000 Units  2,000 Units Intravenous Once Emmanuel Hare Jr., MD        heparin (porcine) injection 4,000 Units  4,000 Units Intra-Catheter 1 time in Clinic/HOD Emmanuel Hare Jr., MD        iron sucrose injection 100 mg  100 mg Intravenous 1 time in Clinic/HOD Emmanuel Hare Jr., MD         Current Discharge Medication List        START taking these medications    Details   ciprofloxacin HCl (CIPRO) 500 MG tablet Take 1 tablet (500 mg total) by mouth once daily. for 4 days  Qty: 4 tablet, Refills: 0      miconazole NITRATE 2 % (MICOTIN) 2 % top powder Apply topically 2 (two) times daily.  Qty: 85 g, Refills: 0      sodium bicarbonate 650 MG tablet Take 1 tablet (650 mg total) by mouth once daily.  Qty: 30 tablet, Refills: 11           CONTINUE these medications which have CHANGED    Details   carvediloL (COREG) 12.5 MG tablet Take 1 tablet (12.5 mg total) by mouth 2 (two) times daily.  Qty: 180 tablet, Refills: 3    Comments: .           CONTINUE these medications which have NOT CHANGED    Details   amLODIPine (NORVASC) 10 MG tablet Take 10 mg by mouth.      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      atorvastatin (LIPITOR) 80 MG tablet Take 1 tablet (80 mg total) by mouth once daily.  Qty: 90 tablet, Refills: 3    Associated Diagnoses: Hyperlipidemia associated with type 2 diabetes mellitus      BD INSULIN SYRINGE ULTRA-FINE 0.5 mL 31 gauge x 5/16" Syrg USE WITH INSULIN 4 TIMES A DAY  Qty: 100 each, Refills: 12      calcium acetate,phosphat bind, (PHOSLO) 667 mg capsule Take 667 mg by mouth 3 (three) times daily with meals.      DULoxetine (CYMBALTA) 20 MG capsule Take 2 capsules (40 mg total) by mouth once daily.  Qty: 180 capsule, Refills: 1      erenumab-aooe (AIMOVIG AUTOINJECTOR) 140 mg/mL autoinjector 140 mg MONTHLY (route: subcutaneous)  Qty: 1 each, Refills: 11    Associated Diagnoses: Intractable chronic migraine without aura and with status migrainosus      ergocalciferol (ERGOCALCIFEROL) 50,000 unit Cap Take 1 capsule (50,000 Units total) by mouth every 7 days.  Qty: 12 capsule, Refills: 3      furosemide (LASIX) 40 MG tablet Take 1 tablet (40 mg total) by mouth once daily.  Qty: 30 tablet, Refills: 11      HYDROcodone-acetaminophen (NORCO)  mg per tablet Take 1 tablet by mouth every 6 (six) hours as needed for Pain.  Qty: 120 tablet, Refills: 0    Comments: Medically necessary for > 7 days due to chronic pain.      LANTUS SOLOSTAR U-100 INSULIN 100 unit/mL (3 mL) InPn pen INJECT 12-15 UNITS UNDER THE SKIN at night  Qty: 15 mL, Refills: 3    Associated Diagnoses: Diabetes mellitus type 2 in obese      losartan (COZAAR) 100 MG tablet Take 1 tablet (100 mg total) by mouth once daily.  Qty: 90 tablet, Refills: 3    Comments: .      methocarbamoL (ROBAXIN) 750 MG Tab TAKE 1-2 TABLETS (750-1,500 MG TOTAL) BY MOUTH 3 (THREE) TIMES DAILY AS NEEDED (MUSCLE SPASMS).  Qty: 180 tablet, Refills: 1    Associated Diagnoses: Chronic midline low back pain without sciatica; Chronic neck pain; Fibromyalgia      ondansetron (ZOFRAN-ODT) 8 MG TbDL 8 mg EVERY 12 HOURS (route: " "oral)      oxybutynin (DITROPAN-XL) 10 MG 24 hr tablet TAKE 1 TABLET BY MOUTH EVERY DAY  Qty: 90 tablet, Refills: 4    Associated Diagnoses: Bladder spasms      oxyCODONE (ROXICODONE) 5 MG immediate release tablet Take 1 tablet (5 mg total) by mouth daily as needed (severe pain).  Qty: 30 tablet, Refills: 0    Comments: Medically necessary for > 7 days due to chronic pain.  Please cancel previous prescription for oxycodone capsules.      pen needle, diabetic (BD ULTRA-FINE SHORT PEN NEEDLE) 31 gauge x 5/16" Ndle USE WITH LANTUS DAILY, e 11.65  Qty: 100 each, Refills: 3    Comments: DX Code Needed  PATIENT IS REQUESTING A REFILL FOR THIS RX.  Associated Diagnoses: Type 2 diabetes mellitus not at goal      sumatriptan (IMITREX) 100 MG tablet Take 1 tablet (100 mg total) by mouth 2 (two) times daily as needed for Migraine (Do not take more than 2 in 24 hours or 3 in a week).  Qty: 30 tablet, Refills: 1      SYNTHROID 50 mcg tablet TAKE 1 TABLET BY MOUTH BEFORE BREAKFAST. ADMINISTER ON AN EMPTY STOMACH AT LEAST 30 MINUTES BEFORE FOOD. IF RECEIVING TUBE FEEDS, HOLD TUBE FEEDS FOR 1 HOUR BEFORE AND AFTER LEVOTHYROXINE ADMINISTRATION.  Qty: 90 tablet, Refills: 2      tenapanor 30 mg Tab Take 30 mg by mouth 2 (two) times a day. Take 1 tablet in the morning with meal and 1 tablet in the afternoon with meal      traZODone (DESYREL) 100 MG tablet TAKE 1 TABLET BY MOUTH NIGHTLY AS NEEDED FOR INSOMNIA.  Qty: 90 tablet, Refills: 2      VELPHORO 500 mg Chew CHEW INSERT TABLET 5 TIMES DAILY WITH MEAL AND SNACK           STOP taking these medications       ceFAZolin 2 gram SolR Comments:   Reason for Stopping:         DIPRIVAN 10 mg/mL infusion Comments:   Reason for Stopping:         fentaNYL (SUBLIMAZE) 50 mcg/mL injection Comments:   Reason for Stopping:         heparin sodium,porcine (HEPARIN, PORCINE,) 1,000 unit/mL injection Comments:   Reason for Stopping:         midazolam (VERSED) 1 mg/mL injection Comments:   Reason for " Stopping:         PHENYLephrine 10 mg/mL injection Comments:   Reason for Stopping:         POLOCAINE-MPF 15 mg/mL (1.5 %) injection Comments:   Reason for Stopping:                 I have seen and examined this patient within the last 30 days. My clinical findings that support the need for the home health skilled services and home bound status are the following:no   Weakness/numbness causing balance and gait disturbance due to Infection, Weakness/Debility, Anemia, and Malignancy/Cancer making it taxing to leave home.    Diet:   renal diet      Referrals/ Consults  Physical Therapy to evaluate and treat. Evaluate for home safety and equipment needs; Establish/upgrade home exercise program. Perform / instruct on therapeutic exercises, gait training, transfer training, and Range of Motion.  Occupational Therapy to evaluate and treat. Evaluate home environment for safety and equipment needs. Perform/Instruct on transfers, ADL training, ROM, and therapeutic exercises.  Speech Therapy  to evaluate and treat for  Language.   to evaluate for community resources/long-range planning.  Aide to provide assistance with personal care, ADLs, and vital signs.    Activities:   activity as tolerated    Nursing:   Agency to admit patient within 24 hours of hospital discharge unless specified on physician order or at patient request    SN to complete comprehensive assessment including routine vital signs. Instruct on disease process and s/s of complications to report to MD. Review/verify medication list sent home with the patient at time of discharge  and instruct patient/caregiver as needed. Frequency may be adjusted depending on start of care date.     Skilled nurse to perform up to 3 visits PRN for symptoms related to diagnosis    Notify MD if SBP > 160 or < 90; DBP > 90 or < 50; HR > 120 or < 50; Temp > 101; O2 < 88%    Ok to schedule additional visits based on staff availability and patient request on consecutive  days within the home health episode.    When multiple disciplines ordered:    Start of Care occurs on Sunday - Wednesday schedule remaining discipline evaluations as ordered on separate consecutive days following the start of care.    Thursday SOC -schedule subsequent evaluations Friday and Monday the following week.     Friday - Saturday SOC - schedule subsequent discipline evaluations on consecutive days starting Monday of the following week.        Miscellaneous   Tiwari Care: Instruct patient/caregiver to empty Tiwari bag.  Change Tiwari every month.  Routine Skin for Bedridden Patients: Instruct patient/caregiver to apply moisture barrier cream to all skin folds and wet areas in perineal area daily and after baths and all bowel movements.    Home Health Aide:  Nursing Three times weekly, Physical Therapy Three times weekly, Occupational Therapy Three times weekly, Speech Language Pathology Twice weekly, Medical Social Work Weekly, and Home Health Aide Three times weekly    Wound Care Orders  yes:    Moisture associated dermatitis Buttocks ,Intertrigo Left Axilla, Moisture associated dermatitis Right Axilla    recommend miconazole powder twice a day and as needed       I certify that this patient is confined to her home and needs intermittent skilled nursing care, physical therapy, speech therapy, and occupational therapy.

## 2024-06-13 NOTE — NURSING
Nurses Note -- 4 Eyes      6/13/2024   1:44 AM      Skin assessed during: Admit      [] No Altered Skin Integrity Present    []Prevention Measures Documented      [x] Yes- Altered Skin Integrity Present or Discovered   [] LDA Added if Not in Epic (Describe Wound)   [x] New Altered Skin Integrity was Present on Admit and Documented in LDA   [] Wound Image Taken    Wound Care Consulted? Yes    Attending Nurse:  Namita Pemberton RN/Staff Member:   Walter

## 2024-06-13 NOTE — ASSESSMENT & PLAN NOTE
Creatine stable for now. BMP reviewed- noted Estimated Creatinine Clearance: 22.4 mL/min (A) (based on SCr of 2.9 mg/dL (H)). according to latest data. Based on current GFR, CKD stage is end stage.  Monitor UOP and serial BMP and adjust therapy as needed. Renally dose meds. Avoid nephrotoxic medications and procedures.    - ESRD on HD M/F  - renal consulted for HD:  - Continue MF iHD schedule   - Plan for next HD 6/14   - continue lasix   - Hyponatremia in setting of ESRD - 1L fluid restriction   - resume home phos binders   - does have RUE fistula, but currently dialyzing through tunneled HD cath

## 2024-06-14 LAB
ALBUMIN SERPL BCP-MCNC: 2.4 G/DL (ref 3.5–5.2)
ALP SERPL-CCNC: 83 U/L (ref 55–135)
ALT SERPL W/O P-5'-P-CCNC: 8 U/L (ref 10–44)
ANION GAP SERPL CALC-SCNC: 10 MMOL/L (ref 8–16)
ANION GAP SERPL CALC-SCNC: 8 MMOL/L (ref 8–16)
AST SERPL-CCNC: 9 U/L (ref 10–40)
BACTERIA #/AREA URNS AUTO: ABNORMAL /HPF
BASOPHILS # BLD AUTO: 0.04 K/UL (ref 0–0.2)
BASOPHILS NFR BLD: 0.5 % (ref 0–1.9)
BILIRUB SERPL-MCNC: 0.2 MG/DL (ref 0.1–1)
BILIRUB UR QL STRIP: NEGATIVE
BUN SERPL-MCNC: 21 MG/DL (ref 8–23)
BUN SERPL-MCNC: 41 MG/DL (ref 8–23)
CALCIUM SERPL-MCNC: 7.9 MG/DL (ref 8.7–10.5)
CALCIUM SERPL-MCNC: 8.1 MG/DL (ref 8.7–10.5)
CHLORIDE SERPL-SCNC: 93 MMOL/L (ref 95–110)
CHLORIDE SERPL-SCNC: 95 MMOL/L (ref 95–110)
CLARITY UR REFRACT.AUTO: ABNORMAL
CO2 SERPL-SCNC: 18 MMOL/L (ref 23–29)
CO2 SERPL-SCNC: 25 MMOL/L (ref 23–29)
COLOR UR AUTO: COLORLESS
CREAT SERPL-MCNC: 2.2 MG/DL (ref 0.5–1.4)
CREAT SERPL-MCNC: 3.3 MG/DL (ref 0.5–1.4)
DIFFERENTIAL METHOD BLD: ABNORMAL
EOSINOPHIL # BLD AUTO: 0.5 K/UL (ref 0–0.5)
EOSINOPHIL NFR BLD: 6 % (ref 0–8)
ERYTHROCYTE [DISTWIDTH] IN BLOOD BY AUTOMATED COUNT: 15.1 % (ref 11.5–14.5)
EST. GFR  (NO RACE VARIABLE): 14.4 ML/MIN/1.73 M^2
EST. GFR  (NO RACE VARIABLE): 23.4 ML/MIN/1.73 M^2
GLUCOSE SERPL-MCNC: 108 MG/DL (ref 70–110)
GLUCOSE SERPL-MCNC: 171 MG/DL (ref 70–110)
GLUCOSE UR QL STRIP: ABNORMAL
HCT VFR BLD AUTO: 28.4 % (ref 37–48.5)
HGB BLD-MCNC: 9.3 G/DL (ref 12–16)
HGB UR QL STRIP: ABNORMAL
HYALINE CASTS UR QL AUTO: 0 /LPF
IMM GRANULOCYTES # BLD AUTO: 0.03 K/UL (ref 0–0.04)
IMM GRANULOCYTES NFR BLD AUTO: 0.4 % (ref 0–0.5)
KETONES UR QL STRIP: NEGATIVE
LEUKOCYTE ESTERASE UR QL STRIP: ABNORMAL
LYMPHOCYTES # BLD AUTO: 1.9 K/UL (ref 1–4.8)
LYMPHOCYTES NFR BLD: 22.4 % (ref 18–48)
MAGNESIUM SERPL-MCNC: 1.6 MG/DL (ref 1.6–2.6)
MCH RBC QN AUTO: 30.2 PG (ref 27–31)
MCHC RBC AUTO-ENTMCNC: 32.7 G/DL (ref 32–36)
MCV RBC AUTO: 92 FL (ref 82–98)
MICROSCOPIC COMMENT: ABNORMAL
MONOCYTES # BLD AUTO: 1 K/UL (ref 0.3–1)
MONOCYTES NFR BLD: 11.6 % (ref 4–15)
NEUTROPHILS # BLD AUTO: 4.9 K/UL (ref 1.8–7.7)
NEUTROPHILS NFR BLD: 59.1 % (ref 38–73)
NITRITE UR QL STRIP: NEGATIVE
NRBC BLD-RTO: 0 /100 WBC
PH UR STRIP: 6 [PH] (ref 5–8)
PHOSPHATE SERPL-MCNC: 3.9 MG/DL (ref 2.7–4.5)
PLATELET # BLD AUTO: 240 K/UL (ref 150–450)
PMV BLD AUTO: 8.9 FL (ref 9.2–12.9)
POCT GLUCOSE: 112 MG/DL (ref 70–110)
POCT GLUCOSE: 139 MG/DL (ref 70–110)
POCT GLUCOSE: 178 MG/DL (ref 70–110)
POCT GLUCOSE: 250 MG/DL (ref 70–110)
POTASSIUM SERPL-SCNC: 3.5 MMOL/L (ref 3.5–5.1)
POTASSIUM SERPL-SCNC: 4 MMOL/L (ref 3.5–5.1)
PROT SERPL-MCNC: 5.9 G/DL (ref 6–8.4)
PROT UR QL STRIP: ABNORMAL
RBC # BLD AUTO: 3.08 M/UL (ref 4–5.4)
RBC #/AREA URNS AUTO: >100 /HPF (ref 0–4)
SODIUM SERPL-SCNC: 123 MMOL/L (ref 136–145)
SODIUM SERPL-SCNC: 126 MMOL/L (ref 136–145)
SP GR UR STRIP: 1.01 (ref 1–1.03)
URN SPEC COLLECT METH UR: ABNORMAL
WBC # BLD AUTO: 8.35 K/UL (ref 3.9–12.7)
WBC #/AREA URNS AUTO: >100 /HPF (ref 0–5)
WBC CLUMPS UR QL AUTO: ABNORMAL
YEAST UR QL AUTO: ABNORMAL

## 2024-06-14 PROCEDURE — 84100 ASSAY OF PHOSPHORUS: CPT | Mod: HCNC | Performed by: STUDENT IN AN ORGANIZED HEALTH CARE EDUCATION/TRAINING PROGRAM

## 2024-06-14 PROCEDURE — 20600001 HC STEP DOWN PRIVATE ROOM: Mod: HCNC

## 2024-06-14 PROCEDURE — 63600175 PHARM REV CODE 636 W HCPCS: Mod: HCNC | Performed by: STUDENT IN AN ORGANIZED HEALTH CARE EDUCATION/TRAINING PROGRAM

## 2024-06-14 PROCEDURE — 5A1D70Z PERFORMANCE OF URINARY FILTRATION, INTERMITTENT, LESS THAN 6 HOURS PER DAY: ICD-10-PCS | Performed by: HOSPITALIST

## 2024-06-14 PROCEDURE — 25000003 PHARM REV CODE 250: Mod: HCNC

## 2024-06-14 PROCEDURE — 80053 COMPREHEN METABOLIC PANEL: CPT | Mod: HCNC | Performed by: STUDENT IN AN ORGANIZED HEALTH CARE EDUCATION/TRAINING PROGRAM

## 2024-06-14 PROCEDURE — 83735 ASSAY OF MAGNESIUM: CPT | Mod: HCNC | Performed by: STUDENT IN AN ORGANIZED HEALTH CARE EDUCATION/TRAINING PROGRAM

## 2024-06-14 PROCEDURE — 80048 BASIC METABOLIC PNL TOTAL CA: CPT | Mod: HCNC,XB

## 2024-06-14 PROCEDURE — 85025 COMPLETE CBC W/AUTO DIFF WBC: CPT | Mod: HCNC | Performed by: STUDENT IN AN ORGANIZED HEALTH CARE EDUCATION/TRAINING PROGRAM

## 2024-06-14 PROCEDURE — 36415 COLL VENOUS BLD VENIPUNCTURE: CPT | Mod: HCNC | Performed by: STUDENT IN AN ORGANIZED HEALTH CARE EDUCATION/TRAINING PROGRAM

## 2024-06-14 PROCEDURE — 25000003 PHARM REV CODE 250: Mod: HCNC | Performed by: STUDENT IN AN ORGANIZED HEALTH CARE EDUCATION/TRAINING PROGRAM

## 2024-06-14 PROCEDURE — 80100016 HC MAINTENANCE HEMODIALYSIS: Mod: HCNC

## 2024-06-14 PROCEDURE — 81001 URINALYSIS AUTO W/SCOPE: CPT | Mod: HCNC

## 2024-06-14 PROCEDURE — 87086 URINE CULTURE/COLONY COUNT: CPT | Mod: HCNC

## 2024-06-14 PROCEDURE — 36415 COLL VENOUS BLD VENIPUNCTURE: CPT | Mod: HCNC

## 2024-06-14 PROCEDURE — 63600175 PHARM REV CODE 636 W HCPCS: Mod: HCNC | Performed by: NURSE PRACTITIONER

## 2024-06-14 PROCEDURE — 99232 SBSQ HOSP IP/OBS MODERATE 35: CPT | Mod: HCNC,,, | Performed by: NURSE PRACTITIONER

## 2024-06-14 RX ORDER — CARVEDILOL 12.5 MG/1
12.5 TABLET ORAL 2 TIMES DAILY
Status: DISCONTINUED | OUTPATIENT
Start: 2024-06-14 | End: 2024-06-15 | Stop reason: HOSPADM

## 2024-06-14 RX ORDER — SODIUM CHLORIDE 9 MG/ML
INJECTION, SOLUTION INTRAVENOUS ONCE
Status: COMPLETED | OUTPATIENT
Start: 2024-06-15 | End: 2024-06-15

## 2024-06-14 RX ORDER — HEPARIN SODIUM 1000 [USP'U]/ML
1000 INJECTION, SOLUTION INTRAVENOUS; SUBCUTANEOUS
Status: DISPENSED | OUTPATIENT
Start: 2024-06-14 | End: 2024-06-15

## 2024-06-14 RX ADMIN — DULOXETINE HYDROCHLORIDE 40 MG: 20 CAPSULE, DELAYED RELEASE ORAL at 03:06

## 2024-06-14 RX ADMIN — AMLODIPINE BESYLATE 10 MG: 10 TABLET ORAL at 08:06

## 2024-06-14 RX ADMIN — ATORVASTATIN CALCIUM 80 MG: 20 TABLET, FILM COATED ORAL at 08:06

## 2024-06-14 RX ADMIN — HYDROCODONE BITARTRATE AND ACETAMINOPHEN 1 TABLET: 10; 325 TABLET ORAL at 07:06

## 2024-06-14 RX ADMIN — SUMATRIPTAN SUCCINATE 100 MG: 50 TABLET ORAL at 03:06

## 2024-06-14 RX ADMIN — SODIUM BICARBONATE 650 MG TABLET 650 MG: at 08:06

## 2024-06-14 RX ADMIN — CALCIUM ACETATE 667 MG: 667 CAPSULE ORAL at 03:06

## 2024-06-14 RX ADMIN — LOSARTAN POTASSIUM 100 MG: 50 TABLET, FILM COATED ORAL at 08:06

## 2024-06-14 RX ADMIN — SODIUM CHLORIDE: 9 INJECTION, SOLUTION INTRAVENOUS at 10:06

## 2024-06-14 RX ADMIN — OXYBUTYNIN CHLORIDE 10 MG: 5 TABLET, EXTENDED RELEASE ORAL at 03:06

## 2024-06-14 RX ADMIN — ONDANSETRON 8 MG: 8 TABLET, ORALLY DISINTEGRATING ORAL at 08:06

## 2024-06-14 RX ADMIN — METHOCARBAMOL 500 MG: 500 TABLET ORAL at 03:06

## 2024-06-14 RX ADMIN — METHOCARBAMOL 500 MG: 500 TABLET ORAL at 08:06

## 2024-06-14 RX ADMIN — PIPERACILLIN SODIUM AND TAZOBACTAM SODIUM 4.5 G: 4; .5 INJECTION, POWDER, FOR SOLUTION INTRAVENOUS at 05:06

## 2024-06-14 RX ADMIN — CARVEDILOL 6.25 MG: 6.25 TABLET, FILM COATED ORAL at 08:06

## 2024-06-14 RX ADMIN — PIPERACILLIN SODIUM AND TAZOBACTAM SODIUM 4.5 G: 4; .5 INJECTION, POWDER, FOR SOLUTION INTRAVENOUS at 03:06

## 2024-06-14 RX ADMIN — CALCIUM ACETATE 667 MG: 667 CAPSULE ORAL at 08:06

## 2024-06-14 RX ADMIN — CARVEDILOL 12.5 MG: 12.5 TABLET, FILM COATED ORAL at 08:06

## 2024-06-14 RX ADMIN — MICONAZOLE NITRATE 2 % TOPICAL POWDER: at 08:06

## 2024-06-14 RX ADMIN — ANASTROZOLE 1 MG: 1 TABLET, COATED ORAL at 03:06

## 2024-06-14 RX ADMIN — HYDROCODONE BITARTRATE AND ACETAMINOPHEN 1 TABLET: 10; 325 TABLET ORAL at 05:06

## 2024-06-14 RX ADMIN — APIXABAN 5 MG: 5 TABLET, FILM COATED ORAL at 04:06

## 2024-06-14 RX ADMIN — TRAZODONE HYDROCHLORIDE 100 MG: 50 TABLET ORAL at 08:06

## 2024-06-14 RX ADMIN — FUROSEMIDE 40 MG: 40 TABLET ORAL at 08:06

## 2024-06-14 RX ADMIN — INSULIN GLARGINE 10 UNITS: 100 INJECTION, SOLUTION SUBCUTANEOUS at 08:06

## 2024-06-14 RX ADMIN — SUMATRIPTAN SUCCINATE 100 MG: 50 TABLET ORAL at 04:06

## 2024-06-14 RX ADMIN — LEVOTHYROXINE SODIUM 50 MCG: 50 TABLET ORAL at 05:06

## 2024-06-14 RX ADMIN — HEPARIN SODIUM 1000 UNITS: 1000 INJECTION, SOLUTION INTRAVENOUS; SUBCUTANEOUS at 02:06

## 2024-06-14 NOTE — ASSESSMENT & PLAN NOTE
Creatine stable for now. BMP reviewed- noted Estimated Creatinine Clearance: 19.7 mL/min (A) (based on SCr of 3.3 mg/dL (H)). according to latest data. Based on current GFR, CKD stage is end stage.  Monitor UOP and serial BMP and adjust therapy as needed. Renally dose meds. Avoid nephrotoxic medications and procedures.    - ESRD on HD M/F  - renal consulted for HD:  -HD today for clearance and volume management, UF goal 3L  - . NA bath adjusted - discussed fluid restrictions with patient 1L  -Plan for additional HD tomorrow   - continue lasix   - Hyponatremia in setting of ESRD - 1L fluid restriction   - resume home phos binders   - does have RUE fistula, but currently dialyzing through tunneled HD cath

## 2024-06-14 NOTE — SUBJECTIVE & OBJECTIVE
Interval History: NAEON. Na continues to downtrend, now 123. Reports one large BM after miralax.  Nephrology following. HD performed today with NA bath adjusted and 1L fluid restriction. Planning for additional HD tomorrow. Patient seen today after HD. Denies any complaints except for HA. Repeat BMP pending.     Review of Systems   Constitutional:  Negative for chills and fever.   Respiratory:  Negative for chest tightness and shortness of breath.    Cardiovascular:  Positive for leg swelling. Negative for chest pain.   Gastrointestinal:  Negative for abdominal pain and nausea.   Skin:  Positive for rash.   Neurological:  Negative for dizziness and weakness.     Objective:     Vital Signs (Most Recent):  Temp: 98.4 °F (36.9 °C) (06/14/24 1539)  Pulse: 82 (06/14/24 1539)  Resp: 18 (06/14/24 1539)  BP: (!) 177/73 (06/14/24 1539)  SpO2: 98 % (06/14/24 1539) Vital Signs (24h Range):  Temp:  [97.9 °F (36.6 °C)-98.4 °F (36.9 °C)] 98.4 °F (36.9 °C)  Pulse:  [65-82] 82  Resp:  [16-18] 18  SpO2:  [98 %-100 %] 98 %  BP: (119-191)/(59-91) 177/73     Weight: 103.7 kg (228 lb 9.9 oz)  Body mass index is 34.76 kg/m².    Intake/Output Summary (Last 24 hours) at 6/14/2024 1713  Last data filed at 6/14/2024 1557  Gross per 24 hour   Intake 1738.98 ml   Output 4850 ml   Net -3111.02 ml         Physical Exam  Vitals and nursing note reviewed.   Constitutional:       Appearance: She is well-developed.   Eyes:      Pupils: Pupils are equal, round, and reactive to light.   Cardiovascular:      Rate and Rhythm: Normal rate and regular rhythm.      Heart sounds: Murmur heard.      Comments: RUE fistula in place with +thrill  Tunneled HD cath to left chest  Pulmonary:      Effort: Pulmonary effort is normal.      Breath sounds: Normal breath sounds.   Abdominal:      Palpations: Abdomen is soft.      Tenderness: There is no abdominal tenderness.   Genitourinary:     Comments: Indwelling cath noted   Musculoskeletal:         General: No  tenderness.      Right lower leg: Edema (R>L) present.      Left lower leg: Edema present.   Skin:     General: Skin is warm and dry.      Comments: Moisture associated dermatitis to buttocks. Intertrigo under breasts    Neurological:      Mental Status: She is alert and oriented to person, place, and time.   Psychiatric:         Behavior: Behavior normal.             Significant Labs: All pertinent labs within the past 24 hours have been reviewed.  BMP:   Recent Labs   Lab 06/14/24  0518      *   K 4.0   CL 95   CO2 18*   BUN 41*   CREATININE 3.3*   CALCIUM 8.1*   MG 1.6     CBC:   Recent Labs   Lab 06/13/24  0554 06/14/24  0518   WBC 10.85 8.35   HGB 9.0* 9.3*   HCT 28.5* 28.4*    240       Significant Imaging: I have reviewed all pertinent imaging results/findings within the past 24 hours.

## 2024-06-14 NOTE — PLAN OF CARE
Pt remains AAOx4, VSS on bedside monitor, afebrile. HD today with 3L off. Tentative plan for HD again tomorrow pending labs. Hyponatremia worsening on am labs, 123 this am. Repeat check post-HD labs in process. PRN imatrex given this afternoon. Sacral breakdown unchanged, barrier cream applied. Miconazole powder to bilateral axilla. Suprapubic catheter intact, repeat UA/culture obtained today per orders. Zosyn continued. Pt remained in bed this shift, bed alarm on, turning q2hrs. Bed locked/lowest position, nonskid socks on, call bell within reach. Pt verbalized understanding to call for needs/assistance. Possible discharge tomorrow.

## 2024-06-14 NOTE — ASSESSMENT & PLAN NOTE
- history of recurrent UTI with indwelling cath in place, changed 6/12  - prior Ucx pansensitive Pseudomonas   - Ucx with no organisms in predominance, cont zosyn for now

## 2024-06-14 NOTE — PLAN OF CARE
06/14/24 1057   Post-Acute Status   Post-Acute Authorization Home Health   Home Health Status Set-up Complete/Auth obtained     The patient is current with [facility]. [facility] is willing to accept this patient. The SW faxed the patient's home health referral to [facility] to resume care via [facility] for review.  The SW contacted Yenifer with [facility] and notified her about the patient's pending d/c today 6-14-24.    4:15 PM  The SW received a phone call from Samia with [facility] requesting that the following be added to the patient's home health;  Supra Pubic Catheter Care     The SW requested the above information be placed in the patient's home health.       Jordan Bravo LCSW  Case Management [facility]

## 2024-06-14 NOTE — PLAN OF CARE
Plan of care and medications discussed with the pt. Questions answered. IV antibiotic given. Pain medication given twice for back pain and then once for a migraine. Tiwari care provided. Pt turned throughout the night. Pt due for dialysis today. Personal items and call light within reach. Fall and safety precautions in place. Overall the pt had an uneventful night.  Problem: Adult Inpatient Plan of Care  Goal: Plan of Care Review  Outcome: Progressing  Goal: Patient-Specific Goal (Individualized)  Outcome: Progressing  Goal: Absence of Hospital-Acquired Illness or Injury  Outcome: Progressing  Goal: Optimal Comfort and Wellbeing  Outcome: Progressing  Goal: Readiness for Transition of Care  Outcome: Progressing     Problem: Infection  Goal: Absence of Infection Signs and Symptoms  Outcome: Progressing     Problem: Diabetes Comorbidity  Goal: Blood Glucose Level Within Targeted Range  Outcome: Progressing     Problem: Sepsis/Septic Shock  Goal: Optimal Coping  Outcome: Progressing  Goal: Absence of Bleeding  Outcome: Progressing  Goal: Blood Glucose Level Within Targeted Range  Outcome: Progressing  Goal: Absence of Infection Signs and Symptoms  Outcome: Progressing  Goal: Optimal Nutrition Intake  Outcome: Progressing     Problem: Acute Kidney Injury/Impairment  Goal: Fluid and Electrolyte Balance  Outcome: Progressing  Goal: Improved Oral Intake  Outcome: Progressing  Goal: Effective Renal Function  Outcome: Progressing     Problem: Wound  Goal: Optimal Coping  Outcome: Progressing  Goal: Optimal Functional Ability  Outcome: Progressing  Goal: Absence of Infection Signs and Symptoms  Outcome: Progressing  Goal: Improved Oral Intake  Outcome: Progressing  Goal: Optimal Pain Control and Function  Outcome: Progressing  Goal: Skin Health and Integrity  Outcome: Progressing  Goal: Optimal Wound Healing  Outcome: Progressing     Problem: Skin Injury Risk Increased  Goal: Skin Health and Integrity  Outcome: Progressing      Problem: Fall Injury Risk  Goal: Absence of Fall and Fall-Related Injury  Outcome: Progressing     Problem: Hemodialysis  Goal: Safe, Effective Therapy Delivery  Outcome: Progressing  Goal: Effective Tissue Perfusion  Outcome: Progressing  Goal: Absence of Infection Signs and Symptoms  Outcome: Progressing

## 2024-06-14 NOTE — NURSING
3.5 hours  Dialysis started  at  1058 hours . Venous line connected  to the HD catheter  and  LAV fistula accessed  with 17G needle for arterial line .Report  received from RON Naranjo . Patient arrived in bed . VSS. Patient  tolerating Well .

## 2024-06-14 NOTE — SUBJECTIVE & OBJECTIVE
Interval History: NAEON. SBP 150s-170s. Na 123. Asymptomatic.     Review of patient's allergies indicates:  No Known Allergies  Current Facility-Administered Medications   Medication Frequency    0.9%  NaCl infusion Once    acetaminophen tablet 1,000 mg Q8H PRN    acetaminophen tablet 650 mg Q4H PRN    albuterol-ipratropium 2.5 mg-0.5 mg/3 mL nebulizer solution 3 mL Q4H PRN    aluminum-magnesium hydroxide-simethicone 200-200-20 mg/5 mL suspension 30 mL QID PRN    amLODIPine tablet 10 mg Daily    anastrozole tablet 1 mg Daily    apixaban tablet 5 mg Daily    atorvastatin tablet 80 mg Daily    bisacodyL suppository 10 mg Daily PRN    calcium acetate(phosphat bind) capsule 667 mg TID WM    carvediloL tablet 6.25 mg BID    dextrose 10% bolus 125 mL 125 mL PRN    dextrose 10% bolus 250 mL 250 mL PRN    DULoxetine DR capsule 40 mg Daily    furosemide tablet 40 mg Daily    glucagon (human recombinant) injection 1 mg PRN    glucose chewable tablet 16 g PRN    glucose chewable tablet 24 g PRN    hydrALAZINE tablet 25 mg Q8H PRN    HYDROcodone-acetaminophen  mg per tablet 1 tablet Q6H PRN    insulin aspart U-100 pen 0-5 Units QID (AC + HS) PRN    insulin glargine U-100 (Lantus) pen 10 Units QHS    levothyroxine tablet 50 mcg Before breakfast    losartan tablet 100 mg Daily    methocarbamoL tablet 500 mg QID    miconazole NITRATE 2 % top powder BID    naloxone 0.4 mg/mL injection 0.02 mg PRN    ondansetron disintegrating tablet 8 mg Q8H PRN    oxybutynin 24 hr tablet 10 mg Daily    piperacillin-tazobactam (ZOSYN) 4.5 g in dextrose 5 % in water (D5W) 100 mL IVPB (MB+) Q12H    polyethylene glycol packet 17 g Daily    prochlorperazine injection Soln 5 mg Q6H PRN    simethicone chewable tablet 80 mg QID PRN    sodium bicarbonate tablet 650 mg Daily    sodium chloride 0.9% flush 5 mL PRN    sumatriptan tablet 100 mg BID PRN    traZODone tablet 100 mg Nightly PRN     Facility-Administered Medications Ordered in Other  Encounters   Medication Frequency    epoetin chloe injection 2,000 Units 1 time in Clinic/HOD    heparin (porcine) injection 2,000 Units Once    heparin (porcine) injection 2,000 Units Once    heparin (porcine) injection 4,000 Units 1 time in Clinic/HOD    iron sucrose injection 100 mg 1 time in Clinic/HOD       Objective:     Vital Signs (Most Recent):  Temp: 98.3 °F (36.8 °C) (06/14/24 0717)  Pulse: 72 (06/14/24 0717)  Resp: 18 (06/14/24 0717)  BP: (!) 170/84 (06/14/24 0717)  SpO2: 99 % (06/14/24 0717) Vital Signs (24h Range):  Temp:  [97.9 °F (36.6 °C)-99.6 °F (37.6 °C)] 98.3 °F (36.8 °C)  Pulse:  [65-75] 72  Resp:  [16-18] 18  SpO2:  [98 %-100 %] 99 %  BP: (136-191)/(72-91) 170/84     Weight: 103.7 kg (228 lb 9.9 oz) (06/13/24 0010)  Body mass index is 34.76 kg/m².  Body surface area is 2.23 meters squared.    I/O last 3 completed shifts:  In: 3024.2 [P.O.:1360; IV Piggyback:1664.2]  Out: 2475 [Urine:2475]     Physical Exam  Vitals and nursing note reviewed.   HENT:      Mouth/Throat:      Pharynx: Oropharynx is clear.   Eyes:      Conjunctiva/sclera: Conjunctivae normal.   Cardiovascular:      Rate and Rhythm: Normal rate.      Pulses: Normal pulses.      Arteriovenous access: Left arteriovenous access is present.     Comments: AVF + thrill   TDC catheter     Pulmonary:      Effort: Pulmonary effort is normal.   Abdominal:      Palpations: Abdomen is soft.   Musculoskeletal:      Right lower leg: No edema.      Left lower leg: No edema.   Neurological:      Mental Status: She is alert and oriented to person, place, and time.   Psychiatric:         Mood and Affect: Mood normal.          Significant Labs:  CBC:   Recent Labs   Lab 06/14/24  0518   WBC 8.35   RBC 3.08*   HGB 9.3*   HCT 28.4*      MCV 92   MCH 30.2   MCHC 32.7     CMP:   Recent Labs   Lab 06/14/24  0518      CALCIUM 8.1*   ALBUMIN 2.4*   PROT 5.9*   *   K 4.0   CO2 18*   CL 95   BUN 41*   CREATININE 3.3*   ALKPHOS 83   ALT 8*    AST 9*   BILITOT 0.2     All labs within the past 24 hours have been reviewed.

## 2024-06-14 NOTE — ASSESSMENT & PLAN NOTE
Patient's FSGs are uncontrolled due to hyperglycemia on current medication regimen.  Last A1c reviewed-   Lab Results   Component Value Date    HGBA1C 6.2 (H) 04/22/2024     Most recent fingerstick glucose reviewed-   Recent Labs   Lab 06/13/24  1722 06/13/24 2016 06/14/24  0819 06/14/24  1539   POCTGLUCOSE 141* 250* 139* 112*       Current correctional scale  Low  Maintain anti-hyperglycemic dose as follows-   Antihyperglycemics (From admission, onward)    Start     Stop Route Frequency Ordered    06/12/24 2100  insulin glargine U-100 (Lantus) pen 10 Units         -- SubQ Nightly 06/1950    06/12/24 1846  insulin aspart U-100 pen 0-5 Units         -- SubQ Before meals & nightly PRN 06/12/24 1747        Hold Oral hypoglycemics while patient is in the hospital.

## 2024-06-14 NOTE — NURSING
3.5hours  dialysis  completed .3L  UF removed .Venous HD catheter line  flushed and heparin locked. HD catheters were taped and wrapped with  gauze and tape  .Left AV fistula arterial line deaccessed , pressure  held for  5minutes.VSS . Patient was transferred in  bed at around 1505hours .

## 2024-06-14 NOTE — PROGRESS NOTES
[facility] Medicine  Progress Note    Patient Name: Cecile Bowen  MRN: [id number]  Patient Class: IP- Inpatient   Admission Date: 6/12/2024  Length of Stay: 2 days  Attending Physician: Zoë Roblero MD  Primary Care Provider: Lurdes Navarro MD        Subjective:     Principal Problem:Sepsis        HPI:  73-year-old female with past medical history of  ESRD on HD M/F, hypertension, hyperlipidemia, ARON, breast CA, chronic indwelling urinary catheter who presents ED for concern for UTI. HH nurse was seeing patient and concerned for cloudy urine, murillo exchanged today. Patient reports she had no symptoms at home, denies fever, abd pain, N/V. Does have chronic back pain.    ED reports patient from SNF, however patient confirmed she was at home being seen by HH.   Febrile in the ED, UA appearing infectious, Cultures drawn and started on broad spectrum abx.     Overview/Hospital Course:  Cecile Bowen is a 72 yo F admitted to hospital medicine for sepsis 2/2 UTI. Started on IV zosyn for UTI. Initially SIRS 4/4, but now afebrile and WBC wnl. Hyponatremia worse than baseline. Started miralax as patient reports only 1-2 BM weekly. One large BM 06/14. Urine cx with no organisms in predominance. Will continue zosyn. Hyponatremia continues to worsen. Nephrology following for HD management; HD performed on 06/14. NA bath adjusted and 1L fluid restriction. Will monitor Na closely. Plan for additional HD tomorrow. Increasing coreg for better BP control. Plan to discharge home with HH when medically ready.     Interval History: NAEON. Na continues to downtrend, now 123. Reports one large BM after miralax.  Nephrology following. HD performed today with NA bath adjusted and 1L fluid restriction. Planning for additional HD tomorrow. Patient seen today after HD. Denies any complaints except for HA. Repeat BMP pending.     Review of Systems   Constitutional:  Negative for chills and  fever.   Respiratory:  Negative for chest tightness and shortness of breath.    Cardiovascular:  Positive for leg swelling. Negative for chest pain.   Gastrointestinal:  Negative for abdominal pain and nausea.   Skin:  Positive for rash.   Neurological:  Negative for dizziness and weakness.     Objective:     Vital Signs (Most Recent):  Temp: 98.4 °F (36.9 °C) (06/14/24 1539)  Pulse: 82 (06/14/24 1539)  Resp: 18 (06/14/24 1539)  BP: (!) 177/73 (06/14/24 1539)  SpO2: 98 % (06/14/24 1539) Vital Signs (24h Range):  Temp:  [97.9 °F (36.6 °C)-98.4 °F (36.9 °C)] 98.4 °F (36.9 °C)  Pulse:  [65-82] 82  Resp:  [16-18] 18  SpO2:  [98 %-100 %] 98 %  BP: (119-191)/(59-91) 177/73     Weight: 103.7 kg (228 lb 9.9 oz)  Body mass index is 34.76 kg/m².    Intake/Output Summary (Last 24 hours) at 6/14/2024 1713  Last data filed at 6/14/2024 1557  Gross per 24 hour   Intake 1738.98 ml   Output 4850 ml   Net -3111.02 ml         Physical Exam  Vitals and nursing note reviewed.   Constitutional:       Appearance: She is well-developed.   Eyes:      Pupils: Pupils are equal, round, and reactive to light.   Cardiovascular:      Rate and Rhythm: Normal rate and regular rhythm.      Heart sounds: Murmur heard.      Comments: RUE fistula in place with +thrill  Tunneled HD cath to left chest  Pulmonary:      Effort: Pulmonary effort is normal.      Breath sounds: Normal breath sounds.   Abdominal:      Palpations: Abdomen is soft.      Tenderness: There is no abdominal tenderness.   Genitourinary:     Comments: Indwelling cath noted   Musculoskeletal:         General: No tenderness.      Right lower leg: Edema (R>L) present.      Left lower leg: Edema present.   Skin:     General: Skin is warm and dry.      Comments: Moisture associated dermatitis to buttocks. Intertrigo under breasts    Neurological:      Mental Status: She is alert and oriented to person, place, and time.   Psychiatric:         Behavior: Behavior normal.              Significant Labs: All pertinent labs within the past 24 hours have been reviewed.  BMP:   Recent Labs   Lab 06/14/24  0518      *   K 4.0   CL 95   CO2 18*   BUN 41*   CREATININE 3.3*   CALCIUM 8.1*   MG 1.6     CBC:   Recent Labs   Lab 06/13/24  0554 06/14/24  0518   WBC 10.85 8.35   HGB 9.0* 9.3*   HCT 28.5* 28.4*    240       Significant Imaging: I have reviewed all pertinent imaging results/findings within the past 24 hours.    Assessment/Plan:      * Sepsis  This patient does have evidence of infective focus  My overall impression is sepsis.  Source: Urinary Tract  Antibiotics given-   Antibiotics (72h ago, onward)      Start     Stop Route Frequency Ordered    06/13/24 0400  piperacillin-tazobactam (ZOSYN) 4.5 g in dextrose 5 % in water (D5W) 100 mL IVPB (MB+)         -- IV Every 12 hours (non-standard times) 06/12/24 1913          Latest lactate reviewed-  Recent Labs   Lab 06/12/24  1237   POCLAC 0.86       Organ dysfunction indicated by  NA    Fluid challenge Not needed - patient is not hypotensive      Will Not start Pressors- Levophed for MAP of 65  Source control achieved by: abx    Bcx and Ucx in process, UA appears infectious   - prior Ucx pansensitive pseudomonas- cont zosyn pending cultures     HTN (hypertension)  Chronic, controlled. Latest blood pressure and vitals reviewed-     Temp:  [97.9 °F (36.6 °C)-98.4 °F (36.9 °C)]   Pulse:  [65-82]   Resp:  [16-18]   BP: (119-191)/(59-91)   SpO2:  [98 %-100 %] .   Home meds for hypertension were reviewed and noted below.   Hypertension Medications               amLODIPine (NORVASC) 10 MG tablet Take 10 mg by mouth.    carvediloL (COREG) 6.25 MG tablet Take 1 tablet (6.25 mg total) by mouth 2 (two) times daily.    furosemide (LASIX) 40 MG tablet Take 1 tablet (40 mg total) by mouth once daily.    losartan (COZAAR) 100 MG tablet Take 1 tablet (100 mg total) by mouth once daily.                 While in the hospital, will manage blood  pressure as follows; Continue home antihypertensive regimen-- increase home coreg for better BP control     Will utilize p.r.n. blood pressure medication only if patient's blood pressure greater than 180/110 and she develops symptoms such as worsening chest pain or shortness of breath.  - PRN hydralazine and volume removal with HD    ESRD (end stage renal disease) on dialysis  Creatine stable for now. BMP reviewed- noted Estimated Creatinine Clearance: 19.7 mL/min (A) (based on SCr of 3.3 mg/dL (H)). according to latest data. Based on current GFR, CKD stage is end stage.  Monitor UOP and serial BMP and adjust therapy as needed. Renally dose meds. Avoid nephrotoxic medications and procedures.    - ESRD on HD M/F  - renal consulted for HD:  -HD today for clearance and volume management, UF goal 3L  - . NA bath adjusted - discussed fluid restrictions with patient 1L  -Plan for additional HD tomorrow   - continue lasix   - Hyponatremia in setting of ESRD - 1L fluid restriction   - resume home phos binders   - does have RUE fistula, but currently dialyzing through tunneled HD cath    Recurrent urinary tract infection  - history of recurrent UTI with indwelling cath in place, changed 6/12  - prior Ucx pansensitive Pseudomonas   - Ucx with no organisms in predominance, cont zosyn for now    Type 2 diabetes mellitus with kidney complication, with long-term current use of insulin  Patient's FSGs are uncontrolled due to hyperglycemia on current medication regimen.  Last A1c reviewed-   Lab Results   Component Value Date    HGBA1C 6.2 (H) 04/22/2024     Most recent fingerstick glucose reviewed-   Recent Labs   Lab 06/13/24  1722 06/13/24 2016 06/14/24  0819 06/14/24  1539   POCTGLUCOSE 141* 250* 139* 112*       Current correctional scale  Low  Maintain anti-hyperglycemic dose as follows-   Antihyperglycemics (From admission, onward)      Start     Stop Route Frequency Ordered    06/12/24 2100  insulin glargine U-100  (Lantus) pen 10 Units         -- SubQ Nightly 06/1950 06/12/24 1846  insulin aspart U-100 pen 0-5 Units         -- SubQ Before meals & nightly PRN 06/12/24 1747          Hold Oral hypoglycemics while patient is in the hospital.    Hyponatremia  Patient has hyponatremia which is uncontrolled,We will aim to correct the sodium by 4-6mEq in 24 hours. We will monitor sodium Daily. The hyponatremia is due to renal insufficiency. We will obtain the following studies: Urine sodium, urine osmolality, serum osmolality or TSH, T4. We will treat the hyponatremia with Hemodialysis and 1L fluid restriction. The patient's sodium results have been reviewed and are listed below.  Recent Labs   Lab 06/14/24  0518   *     Plan for additional HD tomorrow. Repeat BMP pending     Unspecified atrial fibrillation  Patient with Paroxysmal (<7 days) atrial fibrillation which is controlled currently with Beta Blocker. Patient is currently in sinus rhythm.[id number] Score: 3.  Anticoagulation indicated. Anticoagulation done with eliquis .    - on chart review and per patient report she is only on 5mg daily due to easy bruising    Malignant neoplasm of left breast, estrogen receptor positive  - home med anastrozole  - f/u HO    Class 1 obesity with serious comorbidity and body mass index (BMI) of 33.0 to 33.9 in adult  Body mass index is 34.76 kg/m². Morbid obesity complicates all aspects of disease management from diagnostic modalities to treatment. Weight loss encouraged and health benefits explained to patient.     Neurogenic bladder  - cont home med oxybutynin  - indwelling cath in place, changed day of admission 6/12     Metabolic acidosis  - serum bicarb 18  - start sodium bicarb 650 mg daily     Iron deficiency anemia, unspecified  Patient's anemia is currently controlled. Has not received any PRBCs to date. Etiology likely d/t chronic disease due to ESRD  Current CBC reviewed-   Lab Results   Component Value Date    HGB  9.0 (L) 06/13/2024    HCT 28.5 (L) 06/13/2024     Monitor serial CBC and transfuse if patient becomes hemodynamically unstable, symptomatic or H/H drops below 7/21.    Hyperlipidemia associated with type 2 diabetes mellitus  - continue statin    Hypothyroidism  - continue synthroid      VTE Risk Mitigation (From admission, onward)           Ordered     heparin (porcine) injection 1,000 Units  As needed (PRN)         06/14/24 1217     apixaban tablet 5 mg  Daily         06/1950                    Discharge Planning   FLORENTINO: 6/15/2024     Code Status: Full Code   Is the patient medically ready for discharge?:     Reason for patient still in hospital (select all that apply): Patient trending condition, Laboratory test, Treatment, and Consult recommendations  Discharge Plan A: Home, Home with family, Home Health                  Johanna Alberto PA-C  Department of Hospital Medicine   [facility] - [facility]

## 2024-06-14 NOTE — ASSESSMENT & PLAN NOTE
Patient has hyponatremia which is uncontrolled,We will aim to correct the sodium by 4-6mEq in 24 hours. We will monitor sodium Daily. The hyponatremia is due to renal insufficiency. We will obtain the following studies: Urine sodium, urine osmolality, serum osmolality or TSH, T4. We will treat the hyponatremia with Hemodialysis and 1L fluid restriction. The patient's sodium results have been reviewed and are listed below.  Recent Labs   Lab 06/14/24  0518   *     Plan for additional HD tomorrow. Repeat BMP pending

## 2024-06-14 NOTE — PROGRESS NOTES
[facility]  Nephrology  Progress Note    Patient Name: Cecile Bowen  MRN: [id number]  Admission Date: 6/12/2024  Hospital Length of Stay: 2 days  Attending Provider: Zoë Roblero MD   Primary Care Physician: Lurdes Navarro MD  Principal Problem:Sepsis    Subjective:     Interval History: NAEON. SBP 150s-170s. Na 123. Asymptomatic.     Review of patient's allergies indicates:  No Known Allergies  Current Facility-Administered Medications   Medication Frequency    0.9%  NaCl infusion Once    acetaminophen tablet 1,000 mg Q8H PRN    acetaminophen tablet 650 mg Q4H PRN    albuterol-ipratropium 2.5 mg-0.5 mg/3 mL nebulizer solution 3 mL Q4H PRN    aluminum-magnesium hydroxide-simethicone 200-200-20 mg/5 mL suspension 30 mL QID PRN    amLODIPine tablet 10 mg Daily    anastrozole tablet 1 mg Daily    apixaban tablet 5 mg Daily    atorvastatin tablet 80 mg Daily    bisacodyL suppository 10 mg Daily PRN    calcium acetate(phosphat bind) capsule 667 mg TID WM    carvediloL tablet 6.25 mg BID    dextrose 10% bolus 125 mL 125 mL PRN    dextrose 10% bolus 250 mL 250 mL PRN    DULoxetine DR capsule 40 mg Daily    furosemide tablet 40 mg Daily    glucagon (human recombinant) injection 1 mg PRN    glucose chewable tablet 16 g PRN    glucose chewable tablet 24 g PRN    hydrALAZINE tablet 25 mg Q8H PRN    HYDROcodone-acetaminophen  mg per tablet 1 tablet Q6H PRN    insulin aspart U-100 pen 0-5 Units QID (AC + HS) PRN    insulin glargine U-100 (Lantus) pen 10 Units QHS    levothyroxine tablet 50 mcg Before breakfast    losartan tablet 100 mg Daily    methocarbamoL tablet 500 mg QID    miconazole NITRATE 2 % top powder BID    naloxone 0.4 mg/mL injection 0.02 mg PRN    ondansetron disintegrating tablet 8 mg Q8H PRN    oxybutynin 24 hr tablet 10 mg Daily    piperacillin-tazobactam (ZOSYN) 4.5 g in dextrose 5 % in water (D5W) 100 mL IVPB (MB+) Q12H    polyethylene glycol packet 17 g Daily     prochlorperazine injection Soln 5 mg Q6H PRN    simethicone chewable tablet 80 mg QID PRN    sodium bicarbonate tablet 650 mg Daily    sodium chloride 0.9% flush 5 mL PRN    sumatriptan tablet 100 mg BID PRN    traZODone tablet 100 mg Nightly PRN     Facility-Administered Medications Ordered in Other Encounters   Medication Frequency    epoetin chloe injection 2,000 Units 1 time in Clinic/HOD    heparin (porcine) injection 2,000 Units Once    heparin (porcine) injection 2,000 Units Once    heparin (porcine) injection 4,000 Units 1 time in Clinic/HOD    iron sucrose injection 100 mg 1 time in Clinic/HOD       Objective:     Vital Signs (Most Recent):  Temp: 98.3 °F (36.8 °C) (06/14/24 0717)  Pulse: 72 (06/14/24 0717)  Resp: 18 (06/14/24 0717)  BP: (!) 170/84 (06/14/24 0717)  SpO2: 99 % (06/14/24 0717) Vital Signs (24h Range):  Temp:  [97.9 °F (36.6 °C)-99.6 °F (37.6 °C)] 98.3 °F (36.8 °C)  Pulse:  [65-75] 72  Resp:  [16-18] 18  SpO2:  [98 %-100 %] 99 %  BP: (136-191)/(72-91) 170/84     Weight: 103.7 kg (228 lb 9.9 oz) (06/13/24 0010)  Body mass index is 34.76 kg/m².  Body surface area is 2.23 meters squared.    I/O last 3 completed shifts:  In: 3024.2 [P.O.:1360; IV Piggyback:1664.2]  Out: 2475 [Urine:2475]     Physical Exam  Vitals and nursing note reviewed.   HENT:      Mouth/Throat:      Pharynx: Oropharynx is clear.   Eyes:      Conjunctiva/sclera: Conjunctivae normal.   Cardiovascular:      Rate and Rhythm: Normal rate.      Pulses: Normal pulses.      Arteriovenous access: Left arteriovenous access is present.     Comments: AVF + thrill   TDC catheter     Pulmonary:      Effort: Pulmonary effort is normal.   Abdominal:      Palpations: Abdomen is soft.   Musculoskeletal:      Right lower leg: No edema.      Left lower leg: No edema.   Neurological:      Mental Status: She is alert and oriented to person, place, and time.   Psychiatric:         Mood and Affect: Mood normal.          Significant Labs:  CBC:    Recent Labs   Lab 06/14/24  0518   WBC 8.35   RBC 3.08*   HGB 9.3*   HCT 28.4*      MCV 92   MCH 30.2   MCHC 32.7     CMP:   Recent Labs   Lab 06/14/24  0518      CALCIUM 8.1*   ALBUMIN 2.4*   PROT 5.9*   *   K 4.0   CO2 18*   CL 95   BUN 41*   CREATININE 3.3*   ALKPHOS 83   ALT 8*   AST 9*   BILITOT 0.2     All labs within the past 24 hours have been reviewed.         Assessment/Plan:     Renal/  ESRD (end stage renal disease) on dialysis  Nephrology History  iHD Schedule: MF   Unit/MD: [facility]/Dr. Hare   Duration: 4 hours   EDW: 100 kg.   Access: JENNY AVF (recently ok to use by vascular). Currently using TDC   Residual Renal Function: +++    Assessment:     -HD today for clearance and volume management, UF goal 3L  - . NA bath adjusted - discussed fluid restrictions with patient 1L  -Plan for additional HD tomorrow   - continue lasix   - Hyponatremia in setting of ESRD - 1L fluid restriction   - resume home phos binders       ID  * Sepsis  - management per primary                  Thank you for your consult. I will follow-up with patient. Please contact us if you have any additional questions.    Lamar Negro, MUNA  Nephrology  [facility]

## 2024-06-14 NOTE — ASSESSMENT & PLAN NOTE
Chronic, controlled. Latest blood pressure and vitals reviewed-     Temp:  [97.9 °F (36.6 °C)-98.4 °F (36.9 °C)]   Pulse:  [65-82]   Resp:  [16-18]   BP: (119-191)/(59-91)   SpO2:  [98 %-100 %] .   Home meds for hypertension were reviewed and noted below.   Hypertension Medications               amLODIPine (NORVASC) 10 MG tablet Take 10 mg by mouth.    carvediloL (COREG) 6.25 MG tablet Take 1 tablet (6.25 mg total) by mouth 2 (two) times daily.    furosemide (LASIX) 40 MG tablet Take 1 tablet (40 mg total) by mouth once daily.    losartan (COZAAR) 100 MG tablet Take 1 tablet (100 mg total) by mouth once daily.                 While in the hospital, will manage blood pressure as follows; Continue home antihypertensive regimen-- increase home coreg for better BP control     Will utilize p.r.n. blood pressure medication only if patient's blood pressure greater than 180/110 and she develops symptoms such as worsening chest pain or shortness of breath.  - PRN hydralazine and volume removal with HD

## 2024-06-14 NOTE — PROGRESS NOTES
Nurses Note -- 4 Eyes      6/14/2024   5:20 PM      Skin assessed during: Daily Assessment      [] No Altered Skin Integrity Present    []Prevention Measures Documented      [x] Yes- Altered Skin Integrity Present or Discovered   [] LDA Added if Not in Epic (Describe Wound)   [] New Altered Skin Integrity was Present on Admit and Documented in LDA   [] Wound Image Taken    Wound Care Consulted? Yes    Attending Nurse:  Cecille Pemberton RN/Staff Member:   Tracee DUPONT

## 2024-06-14 NOTE — ASSESSMENT & PLAN NOTE
Nephrology History  iHD Schedule: MF   Unit/MD: [facility]/Dr. Hare   Duration: 4 hours   EDW: 100 kg.   Access: JENNY AVF (recently ok to use by vascular). Currently using TDC   Residual Renal Function: +++    Assessment:     -HD today for clearance and volume management, UF goal 3L  - . NA bath adjusted - discussed fluid restrictions with patient 1L  -May need additional HD tomorrow   - continue lasix   - Hyponatremia in setting of ESRD - 1L fluid restriction   - resume home phos binders

## 2024-06-15 VITALS
SYSTOLIC BLOOD PRESSURE: 154 MMHG | DIASTOLIC BLOOD PRESSURE: 78 MMHG | WEIGHT: 228.63 LBS | BODY MASS INDEX: 34.65 KG/M2 | OXYGEN SATURATION: 99 % | RESPIRATION RATE: 15 BRPM | TEMPERATURE: 99 F | HEIGHT: 68 IN | HEART RATE: 78 BPM

## 2024-06-15 LAB
ALBUMIN SERPL BCP-MCNC: 2.1 G/DL (ref 3.5–5.2)
ALP SERPL-CCNC: 65 U/L (ref 55–135)
ALT SERPL W/O P-5'-P-CCNC: 7 U/L (ref 10–44)
ANION GAP SERPL CALC-SCNC: 9 MMOL/L (ref 8–16)
AST SERPL-CCNC: 9 U/L (ref 10–40)
BACTERIA UR CULT: NORMAL
BASOPHILS # BLD AUTO: 0.06 K/UL (ref 0–0.2)
BASOPHILS NFR BLD: 0.9 % (ref 0–1.9)
BILIRUB SERPL-MCNC: 0.2 MG/DL (ref 0.1–1)
BUN SERPL-MCNC: 27 MG/DL (ref 8–23)
CALCIUM SERPL-MCNC: 7.6 MG/DL (ref 8.7–10.5)
CHLORIDE SERPL-SCNC: 96 MMOL/L (ref 95–110)
CO2 SERPL-SCNC: 22 MMOL/L (ref 23–29)
CREAT SERPL-MCNC: 2.5 MG/DL (ref 0.5–1.4)
DIFFERENTIAL METHOD BLD: ABNORMAL
EOSINOPHIL # BLD AUTO: 0.5 K/UL (ref 0–0.5)
EOSINOPHIL NFR BLD: 8.2 % (ref 0–8)
ERYTHROCYTE [DISTWIDTH] IN BLOOD BY AUTOMATED COUNT: 15.3 % (ref 11.5–14.5)
EST. GFR  (NO RACE VARIABLE): 20.1 ML/MIN/1.73 M^2
GLUCOSE SERPL-MCNC: 94 MG/DL (ref 70–110)
HCT VFR BLD AUTO: 25.7 % (ref 37–48.5)
HGB BLD-MCNC: 8.1 G/DL (ref 12–16)
IMM GRANULOCYTES # BLD AUTO: 0.05 K/UL (ref 0–0.04)
IMM GRANULOCYTES NFR BLD AUTO: 0.8 % (ref 0–0.5)
LYMPHOCYTES # BLD AUTO: 1.8 K/UL (ref 1–4.8)
LYMPHOCYTES NFR BLD: 28 % (ref 18–48)
MAGNESIUM SERPL-MCNC: 1.6 MG/DL (ref 1.6–2.6)
MCH RBC QN AUTO: 29.5 PG (ref 27–31)
MCHC RBC AUTO-ENTMCNC: 31.5 G/DL (ref 32–36)
MCV RBC AUTO: 94 FL (ref 82–98)
MONOCYTES # BLD AUTO: 0.8 K/UL (ref 0.3–1)
MONOCYTES NFR BLD: 12.4 % (ref 4–15)
NEUTROPHILS # BLD AUTO: 3.2 K/UL (ref 1.8–7.7)
NEUTROPHILS NFR BLD: 49.7 % (ref 38–73)
NRBC BLD-RTO: 0 /100 WBC
PHOSPHATE SERPL-MCNC: 3.2 MG/DL (ref 2.7–4.5)
PLATELET # BLD AUTO: 233 K/UL (ref 150–450)
PMV BLD AUTO: 8.7 FL (ref 9.2–12.9)
POCT GLUCOSE: 108 MG/DL (ref 70–110)
POCT GLUCOSE: 113 MG/DL (ref 70–110)
POCT GLUCOSE: 114 MG/DL (ref 70–110)
POCT GLUCOSE: 180 MG/DL (ref 70–110)
POTASSIUM SERPL-SCNC: 3.9 MMOL/L (ref 3.5–5.1)
PROT SERPL-MCNC: 5 G/DL (ref 6–8.4)
RBC # BLD AUTO: 2.75 M/UL (ref 4–5.4)
SODIUM SERPL-SCNC: 127 MMOL/L (ref 136–145)
WBC # BLD AUTO: 6.35 K/UL (ref 3.9–12.7)

## 2024-06-15 PROCEDURE — 90935 HEMODIALYSIS ONE EVALUATION: CPT | Mod: HCNC

## 2024-06-15 PROCEDURE — 36415 COLL VENOUS BLD VENIPUNCTURE: CPT | Mod: HCNC | Performed by: STUDENT IN AN ORGANIZED HEALTH CARE EDUCATION/TRAINING PROGRAM

## 2024-06-15 PROCEDURE — 25000003 PHARM REV CODE 250: Mod: HCNC | Performed by: STUDENT IN AN ORGANIZED HEALTH CARE EDUCATION/TRAINING PROGRAM

## 2024-06-15 PROCEDURE — 25000003 PHARM REV CODE 250: Mod: HCNC

## 2024-06-15 PROCEDURE — 83735 ASSAY OF MAGNESIUM: CPT | Mod: HCNC | Performed by: STUDENT IN AN ORGANIZED HEALTH CARE EDUCATION/TRAINING PROGRAM

## 2024-06-15 PROCEDURE — 84100 ASSAY OF PHOSPHORUS: CPT | Mod: HCNC | Performed by: STUDENT IN AN ORGANIZED HEALTH CARE EDUCATION/TRAINING PROGRAM

## 2024-06-15 PROCEDURE — 85025 COMPLETE CBC W/AUTO DIFF WBC: CPT | Mod: HCNC | Performed by: STUDENT IN AN ORGANIZED HEALTH CARE EDUCATION/TRAINING PROGRAM

## 2024-06-15 PROCEDURE — 63600175 PHARM REV CODE 636 W HCPCS: Mod: HCNC | Performed by: NURSE PRACTITIONER

## 2024-06-15 PROCEDURE — 25000003 PHARM REV CODE 250: Mod: HCNC | Performed by: NURSE PRACTITIONER

## 2024-06-15 PROCEDURE — 63600175 PHARM REV CODE 636 W HCPCS: Mod: HCNC | Performed by: STUDENT IN AN ORGANIZED HEALTH CARE EDUCATION/TRAINING PROGRAM

## 2024-06-15 PROCEDURE — 99232 SBSQ HOSP IP/OBS MODERATE 35: CPT | Mod: HCNC,,, | Performed by: NURSE PRACTITIONER

## 2024-06-15 PROCEDURE — 80053 COMPREHEN METABOLIC PANEL: CPT | Mod: HCNC | Performed by: STUDENT IN AN ORGANIZED HEALTH CARE EDUCATION/TRAINING PROGRAM

## 2024-06-15 RX ORDER — HEPARIN 100 UNIT/ML
1000 SYRINGE INTRAVENOUS
Status: DISCONTINUED | OUTPATIENT
Start: 2024-06-15 | End: 2024-06-15

## 2024-06-15 RX ORDER — HEPARIN SODIUM 1000 [USP'U]/ML
1000 INJECTION, SOLUTION INTRAVENOUS; SUBCUTANEOUS
Status: DISCONTINUED | OUTPATIENT
Start: 2024-06-15 | End: 2024-06-15 | Stop reason: HOSPADM

## 2024-06-15 RX ORDER — CARVEDILOL 12.5 MG/1
12.5 TABLET ORAL 2 TIMES DAILY
Qty: 180 TABLET | Refills: 3 | Status: SHIPPED | OUTPATIENT
Start: 2024-06-15 | End: 2025-06-15

## 2024-06-15 RX ORDER — CIPROFLOXACIN 500 MG/1
500 TABLET ORAL DAILY
Qty: 4 TABLET | Refills: 0 | Status: SHIPPED | OUTPATIENT
Start: 2024-06-16 | End: 2024-06-20

## 2024-06-15 RX ADMIN — SODIUM CHLORIDE: 9 INJECTION, SOLUTION INTRAVENOUS at 02:06

## 2024-06-15 RX ADMIN — HEPARIN SODIUM 1000 UNITS: 1000 INJECTION, SOLUTION INTRAVENOUS; SUBCUTANEOUS at 04:06

## 2024-06-15 RX ADMIN — HYDROCODONE BITARTRATE AND ACETAMINOPHEN 1 TABLET: 10; 325 TABLET ORAL at 05:06

## 2024-06-15 RX ADMIN — CALCIUM ACETATE 667 MG: 667 CAPSULE ORAL at 08:06

## 2024-06-15 RX ADMIN — LEVOTHYROXINE SODIUM 50 MCG: 50 TABLET ORAL at 06:06

## 2024-06-15 RX ADMIN — CALCIUM ACETATE 667 MG: 667 CAPSULE ORAL at 12:06

## 2024-06-15 RX ADMIN — HYDROCODONE BITARTRATE AND ACETAMINOPHEN 1 TABLET: 10; 325 TABLET ORAL at 10:06

## 2024-06-15 RX ADMIN — METHOCARBAMOL 500 MG: 500 TABLET ORAL at 05:06

## 2024-06-15 RX ADMIN — METHOCARBAMOL 500 MG: 500 TABLET ORAL at 10:06

## 2024-06-15 RX ADMIN — CARVEDILOL 12.5 MG: 12.5 TABLET, FILM COATED ORAL at 08:06

## 2024-06-15 RX ADMIN — LOSARTAN POTASSIUM 100 MG: 50 TABLET, FILM COATED ORAL at 08:06

## 2024-06-15 RX ADMIN — ACETAMINOPHEN 1000 MG: 500 TABLET ORAL at 02:06

## 2024-06-15 RX ADMIN — SODIUM BICARBONATE 650 MG TABLET 650 MG: at 08:06

## 2024-06-15 RX ADMIN — PIPERACILLIN SODIUM AND TAZOBACTAM SODIUM 4.5 G: 4; .5 INJECTION, POWDER, FOR SOLUTION INTRAVENOUS at 04:06

## 2024-06-15 RX ADMIN — ATORVASTATIN CALCIUM 80 MG: 20 TABLET, FILM COATED ORAL at 08:06

## 2024-06-15 RX ADMIN — FUROSEMIDE 40 MG: 40 TABLET ORAL at 08:06

## 2024-06-15 RX ADMIN — DULOXETINE HYDROCHLORIDE 40 MG: 20 CAPSULE, DELAYED RELEASE ORAL at 05:06

## 2024-06-15 RX ADMIN — OXYBUTYNIN CHLORIDE 10 MG: 5 TABLET, EXTENDED RELEASE ORAL at 05:06

## 2024-06-15 RX ADMIN — AMLODIPINE BESYLATE 10 MG: 10 TABLET ORAL at 08:06

## 2024-06-15 RX ADMIN — APIXABAN 5 MG: 5 TABLET, FILM COATED ORAL at 05:06

## 2024-06-15 RX ADMIN — CALCIUM ACETATE 667 MG: 667 CAPSULE ORAL at 05:06

## 2024-06-15 RX ADMIN — MICONAZOLE NITRATE 2 % TOPICAL POWDER: at 08:06

## 2024-06-15 RX ADMIN — ANASTROZOLE 1 MG: 1 TABLET, COATED ORAL at 05:06

## 2024-06-15 NOTE — PLAN OF CARE
Pt AAOx4, VSS. LFA fistula +/+. Suprapubic catheter intact draining yellow urine, see flowsheet for output. Triad cream applied to skin folds PRN. PRN norco administered once for back pain. Pt with 0 BM overnight, pt turned q2. Cont Zosyn. BG monitored AC/HS, insulin administered per MAR. Plan for HD today. Bed in lowest locked position, bed alarm set, call light within reach, POC ongoing.

## 2024-06-15 NOTE — ASSESSMENT & PLAN NOTE
Nephrology History  iHD Schedule: MF   Unit/MD: [facility]/Dr. Hare   Duration: 4 hours   EDW: 100 kg.   Access: JENNY AVF (recently ok to use by vascular). Currently using TDC   Residual Renal Function: +++    Assessment:     -HD today for clearance and volume management, UF goal 2L  - . NA bath adjusted - discussed fluid restrictions with patient 1L  - continue lasix   - Hyponatremia in setting of ESRD  - home phos binders

## 2024-06-15 NOTE — SUBJECTIVE & OBJECTIVE
Interval History:   Additional HD session today to aid in hyponatremia;  hypervolemic on exam.    Tolerating session well, no complaints.  Plans for discharge post HD today    Review of patient's allergies indicates:  No Known Allergies  Current Facility-Administered Medications   Medication Frequency    0.9%  NaCl infusion Once    acetaminophen tablet 1,000 mg Q8H PRN    acetaminophen tablet 650 mg Q4H PRN    albuterol-ipratropium 2.5 mg-0.5 mg/3 mL nebulizer solution 3 mL Q4H PRN    aluminum-magnesium hydroxide-simethicone 200-200-20 mg/5 mL suspension 30 mL QID PRN    amLODIPine tablet 10 mg Daily    anastrozole tablet 1 mg Daily    apixaban tablet 5 mg Daily    atorvastatin tablet 80 mg Daily    bisacodyL suppository 10 mg Daily PRN    calcium acetate(phosphat bind) capsule 667 mg TID WM    carvediloL tablet 12.5 mg BID    dextrose 10% bolus 125 mL 125 mL PRN    dextrose 10% bolus 250 mL 250 mL PRN    DULoxetine DR capsule 40 mg Daily    furosemide tablet 40 mg Daily    glucagon (human recombinant) injection 1 mg PRN    glucose chewable tablet 16 g PRN    glucose chewable tablet 24 g PRN    hydrALAZINE tablet 25 mg Q8H PRN    HYDROcodone-acetaminophen  mg per tablet 1 tablet Q6H PRN    insulin aspart U-100 pen 0-5 Units QID (AC + HS) PRN    insulin glargine U-100 (Lantus) pen 10 Units QHS    levothyroxine tablet 50 mcg Before breakfast    losartan tablet 100 mg Daily    methocarbamoL tablet 500 mg QID    miconazole NITRATE 2 % top powder BID    naloxone 0.4 mg/mL injection 0.02 mg PRN    ondansetron disintegrating tablet 8 mg Q8H PRN    oxybutynin 24 hr tablet 10 mg Daily    piperacillin-tazobactam (ZOSYN) 4.5 g in dextrose 5 % in water (D5W) 100 mL IVPB (MB+) Q12H    polyethylene glycol packet 17 g Daily    prochlorperazine injection Soln 5 mg Q6H PRN    simethicone chewable tablet 80 mg QID PRN    sodium bicarbonate tablet 650 mg Daily    sodium chloride 0.9% flush 5 mL PRN    sumatriptan tablet 100 mg  BID PRN    traZODone tablet 100 mg Nightly PRN     Facility-Administered Medications Ordered in Other Encounters   Medication Frequency    epoetin chloe injection 2,000 Units 1 time in Clinic/HOD    heparin (porcine) injection 2,000 Units Once    heparin (porcine) injection 2,000 Units Once    heparin (porcine) injection 4,000 Units 1 time in Clinic/HOD    iron sucrose injection 100 mg 1 time in Clinic/HOD       Objective:     Vital Signs (Most Recent):  Temp: 98.4 °F (36.9 °C) (06/15/24 1118)  Pulse: 67 (06/15/24 1445)  Resp: 14 (06/15/24 1118)  BP: 132/67 (06/15/24 1445)  SpO2: 100 % (06/15/24 1445) Vital Signs (24h Range):  Temp:  [98 °F (36.7 °C)-98.4 °F (36.9 °C)] 98.4 °F (36.9 °C)  Pulse:  [63-82] 67  Resp:  [14-18] 14  SpO2:  [95 %-100 %] 100 %  BP: (132-177)/(59-82) 132/67     Weight: 103.7 kg (228 lb 9.9 oz) (06/13/24 0010)  Body mass index is 34.76 kg/m².  Body surface area is 2.23 meters squared.    I/O last 3 completed shifts:  In: 2198.3 [P.O.:1386; I.V.:291.2; Other:300; IV Piggyback:221.1]  Out: 5155 [Urine:1505; Other:3650]     Physical Exam  Vitals and nursing note reviewed.   HENT:      Mouth/Throat:      Pharynx: Oropharynx is clear.   Eyes:      Conjunctiva/sclera: Conjunctivae normal.   Cardiovascular:      Rate and Rhythm: Normal rate.      Pulses: Normal pulses.      Arteriovenous access: Left arteriovenous access is present.     Comments: AVF + thrill   TDC catheter     Pulmonary:      Effort: Pulmonary effort is normal.   Abdominal:      Palpations: Abdomen is soft.   Musculoskeletal:      Right lower leg: No edema.      Left lower leg: No edema.   Neurological:      Mental Status: She is alert and oriented to person, place, and time.   Psychiatric:         Mood and Affect: Mood normal.          Significant Labs:  CBC:   Recent Labs   Lab 06/15/24  0401   WBC 6.35   RBC 2.75*   HGB 8.1*   HCT 25.7*      MCV 94   MCH 29.5   MCHC 31.5*     CMP:   Recent Labs   Lab 06/15/24  0401   GLU 94    CALCIUM 7.6*   ALBUMIN 2.1*   PROT 5.0*   *   K 3.9   CO2 22*   CL 96   BUN 27*   CREATININE 2.5*   ALKPHOS 65   ALT 7*   AST 9*   BILITOT 0.2

## 2024-06-15 NOTE — PLAN OF CARE
Patient will transport home in an ambulance at 6 pm. Sw called patient's sister to inform her that she is scheduled to be picked up at 6:50   pm from the hospital.   06/15/24 1533   Final Note   Assessment Type Final Discharge Note   Anticipated Discharge Disposition Home-Health   What phone number can be called within the next 1-3 days to see how you are doing after discharge? [phone]   Hospital Resources/Appts/Education Provided Provided patient/caregiver with written discharge plan information;Appointments scheduled and added to AVS     [facility] - Trans[facility] Step[facility]  Discharge Final Note    Primary Care Provider: Lurdes Navarro MD    Expected Discharge Date: 6/15/2024    Final Discharge Note (most recent)       Final Note - 06/15/24 1533          Final Note    Assessment Type Final Discharge Note (P)      Anticipated Discharge Disposition Home-Health Care Svc (P)      What phone number can be called within the next 1-3 days to see how you are doing after discharge? [phone] (P)      Hospital Resources/Appts/Education Provided Provided patient/caregiver with written discharge plan information;Appointments scheduled and added to AVS (P)                      Important Message from Medicare

## 2024-06-15 NOTE — PROGRESS NOTES
Nurses Note -- 4 Eyes      6/15/2024   12:25 PM      Skin assessed during: Daily Assessment      [] No Altered Skin Integrity Present    []Prevention Measures Documented      [x] Yes- Altered Skin Integrity Present or Discovered   [] LDA Added if Not in Epic (Describe Wound)   [] New Altered Skin Integrity was Present on Admit and Documented in LDA   [] Wound Image Taken    Wound Care Consulted? Yes    Attending Nurse:  Cecille Pemberton RN/Staff Member:   VERNA Paul RN

## 2024-06-15 NOTE — NURSING
3hours  dialysis  started at  [address] . Patient arrived  in bed . VSS . Patient's  Venous line is accessed via HD catheter and Arterial line accessed through AV fistula . Patient is tolerating well .Report received  from RON Naranjo before patient's arrival .

## 2024-06-15 NOTE — PROGRESS NOTES
[facility]  Nephrology  Progress Note    Patient Name: Cecile Bowen  MRN: [id number]  Admission Date: 6/12/2024  Hospital Length of Stay: 3 days  Attending Provider: Zoë Roblero MD   Primary Care Physician: Lurdes Navarro MD  Principal Problem:Sepsis    Subjective:     HPI:  73-year-old female with past medical history of  ESRD on HD M/F, hypertension, hyperlipidemia, ARON, breast CA, chronic indwelling urinary catheter who presents ED for concern for UTI.  nurse was seeing patient and concerned for cloudy urine, murillo exchanged today. End stage renal disease secondary to HTN and DM2 suspected. Was previously on dialysis and it was stopped for many months and then restarted per patient ~ 10/23. Last HD prior to presentation was on 6/10. Nephrology consulted for ESRD.     Interval History:   Additional HD session today to aid in hyponatremia;  hypervolemic on exam.    Tolerating session well, no complaints.  Plans for discharge post HD today    Review of patient's allergies indicates:  No Known Allergies  Current Facility-Administered Medications   Medication Frequency    0.9%  NaCl infusion Once    acetaminophen tablet 1,000 mg Q8H PRN    acetaminophen tablet 650 mg Q4H PRN    albuterol-ipratropium 2.5 mg-0.5 mg/3 mL nebulizer solution 3 mL Q4H PRN    aluminum-magnesium hydroxide-simethicone 200-200-20 mg/5 mL suspension 30 mL QID PRN    amLODIPine tablet 10 mg Daily    anastrozole tablet 1 mg Daily    apixaban tablet 5 mg Daily    atorvastatin tablet 80 mg Daily    bisacodyL suppository 10 mg Daily PRN    calcium acetate(phosphat bind) capsule 667 mg TID WM    carvediloL tablet 12.5 mg BID    dextrose 10% bolus 125 mL 125 mL PRN    dextrose 10% bolus 250 mL 250 mL PRN    DULoxetine DR capsule 40 mg Daily    furosemide tablet 40 mg Daily    glucagon (human recombinant) injection 1 mg PRN    glucose chewable tablet 16 g PRN    glucose chewable tablet 24 g PRN    hydrALAZINE  tablet 25 mg Q8H PRN    HYDROcodone-acetaminophen  mg per tablet 1 tablet Q6H PRN    insulin aspart U-100 pen 0-5 Units QID (AC + HS) PRN    insulin glargine U-100 (Lantus) pen 10 Units QHS    levothyroxine tablet 50 mcg Before breakfast    losartan tablet 100 mg Daily    methocarbamoL tablet 500 mg QID    miconazole NITRATE 2 % top powder BID    naloxone 0.4 mg/mL injection 0.02 mg PRN    ondansetron disintegrating tablet 8 mg Q8H PRN    oxybutynin 24 hr tablet 10 mg Daily    piperacillin-tazobactam (ZOSYN) 4.5 g in dextrose 5 % in water (D5W) 100 mL IVPB (MB+) Q12H    polyethylene glycol packet 17 g Daily    prochlorperazine injection Soln 5 mg Q6H PRN    simethicone chewable tablet 80 mg QID PRN    sodium bicarbonate tablet 650 mg Daily    sodium chloride 0.9% flush 5 mL PRN    sumatriptan tablet 100 mg BID PRN    traZODone tablet 100 mg Nightly PRN     Facility-Administered Medications Ordered in Other Encounters   Medication Frequency    epoetin chloe injection 2,000 Units 1 time in Clinic/HOD    heparin (porcine) injection 2,000 Units Once    heparin (porcine) injection 2,000 Units Once    heparin (porcine) injection 4,000 Units 1 time in Clinic/HOD    iron sucrose injection 100 mg 1 time in Clinic/HOD       Objective:     Vital Signs (Most Recent):  Temp: 98.4 °F (36.9 °C) (06/15/24 1118)  Pulse: 67 (06/15/24 1445)  Resp: 14 (06/15/24 1118)  BP: 132/67 (06/15/24 1445)  SpO2: 100 % (06/15/24 1445) Vital Signs (24h Range):  Temp:  [98 °F (36.7 °C)-98.4 °F (36.9 °C)] 98.4 °F (36.9 °C)  Pulse:  [63-82] 67  Resp:  [14-18] 14  SpO2:  [95 %-100 %] 100 %  BP: (132-177)/(59-82) 132/67     Weight: 103.7 kg (228 lb 9.9 oz) (06/13/24 0010)  Body mass index is 34.76 kg/m².  Body surface area is 2.23 meters squared.    I/O last 3 completed shifts:  In: 2198.3 [P.O.:1386; I.V.:291.2; Other:300; IV Piggyback:221.1]  Out: 5155 [Urine:1505; Other:3650]     Physical Exam  Vitals and nursing note reviewed.   HENT:       Mouth/Throat:      Pharynx: Oropharynx is clear.   Eyes:      Conjunctiva/sclera: Conjunctivae normal.   Cardiovascular:      Rate and Rhythm: Normal rate.      Pulses: Normal pulses.      Arteriovenous access: Left arteriovenous access is present.     Comments: AVF + thrill   TDC catheter     Pulmonary:      Effort: Pulmonary effort is normal.   Abdominal:      Palpations: Abdomen is soft.   Musculoskeletal:      Right lower leg: No edema.      Left lower leg: No edema.   Neurological:      Mental Status: She is alert and oriented to person, place, and time.   Psychiatric:         Mood and Affect: Mood normal.          Significant Labs:  CBC:   Recent Labs   Lab 06/15/24  0401   WBC 6.35   RBC 2.75*   HGB 8.1*   HCT 25.7*      MCV 94   MCH 29.5   MCHC 31.5*     CMP:   Recent Labs   Lab 06/15/24  0401   GLU 94   CALCIUM 7.6*   ALBUMIN 2.1*   PROT 5.0*   *   K 3.9   CO2 22*   CL 96   BUN 27*   CREATININE 2.5*   ALKPHOS 65   ALT 7*   AST 9*   BILITOT 0.2        Assessment/Plan:     Renal/  ESRD (end stage renal disease) on dialysis  Nephrology History  iHD Schedule: MF   Unit/MD: [facility]/Dr. Hare   Duration: 4 hours   EDW: 100 kg.   Access: JENNY AVF (recently ok to use by vascular). Currently using TDC   Residual Renal Function: +++    Assessment:     -HD today for clearance and volume management, UF goal 2L  - . NA bath adjusted - discussed fluid restrictions with patient 1L  - continue lasix   - Hyponatremia in setting of ESRD  - home phos binders         Saul Rogers, NP  Nephrology  [facility]

## 2024-06-15 NOTE — PROGRESS NOTES
AVS printout reviewed with pt and she verbalized understanding. Prescription meds delivered to pt's room. VSS, PIV removed. Pt escorted off the unit by [facility] personnel. No distress was noted.

## 2024-06-15 NOTE — NURSING
Nurses Note -- 4 Eyes      6/15/2024   6:24 AM      Skin assessed during: Q Shift Change      [] No Altered Skin Integrity Present    []Prevention Measures Documented      [x] Yes- Altered Skin Integrity Present or Discovered   [] LDA Added if Not in Epic (Describe Wound)   [] New Altered Skin Integrity was Present on Admit and Documented in LDA   [] Wound Image Taken    Wound Care Consulted? Yes    Attending Nurse:  Asuncion Pemberton RN/Staff Member:  RON Sapp

## 2024-06-15 NOTE — NURSING
3hours  dialysis  completed .2L  UF removed .Venous HD catheter line  flushed and heparin locked. HD catheters were taped and wrapped with  gauze and tape  .Left AV fistula arterial line deaccessed , pressure  held for  10minutes.VSS . Patient was transferred in  bed .VSS.

## 2024-06-15 NOTE — PLAN OF CARE
Pt remains AAOx4, VSS on bedside monitor, afebrile, on RA. Pt currently in HD. Na up to 127 this am. PRN Vicodin given per pt request. Pt to discharge home with home health this evening. [facility] transport scheduled for 1850.

## 2024-06-16 NOTE — DISCHARGE SUMMARY
Petros Shaffer - Transplant [facility] Medicine  Discharge Summary      Patient Name: Cecile Bowen  MRN: [id number]  LISA: [id number]  Patient Class: IP- Inpatient  Admission Date: 6/12/2024  Hospital Length of Stay: 3 days  Discharge Date and Time: 6/15/2024  6:45 PM  Attending Physician: Ania att. providers found   Discharging Provider: Johanna Alberto PA-C  Primary Care Provider: Lurdes Navarro MD  [facility] Medicine Team: [facility] Johanna Alberto PA-C  Primary Care Team: [facility]    HPI:   73-year-old female with past medical history of  ESRD on HD M/F, hypertension, hyperlipidemia, ARON, breast CA, chronic indwelling urinary catheter who presents ED for concern for UTI. HH nurse was seeing patient and concerned for cloudy urine, murillo exchanged today. Patient reports she had no symptoms at home, denies fever, abd pain, N/V. Does have chronic back pain.    ED reports patient from SNF, however patient confirmed she was at home being seen by HH.   Febrile in the ED, UA appearing infectious, Cultures drawn and started on broad spectrum abx.     * No surgery found *      Hospital Course:   Cecile Bowen is a 72 yo F admitted to hospital medicine for sepsis 2/2 UTI. Started on IV zosyn for UTI. Initially SIRS 4/4, but now afebrile and WBC wnl. Hyponatremia worse than baseline. Started miralax as patient reports only 1-2 BM weekly. One large BM 06/14. Urine cx with no organisms in predominance. Will continue zosyn. Hyponatremia continues to worsen. Nephrology following for HD management; HD performed on 06/14. NA bath adjusted and 1L fluid restriction. Will monitor Na closely. Additional HD performed 06/15. Increasing coreg for better BP control. Hyponatremia asymptomatic. Transitioned to oral cipro on discharge to complete UTI treatment. Started on sodium bicarbonate daily. Plan and medication changes discussed with patient; agreeable to plan. ER precautions were given. All questions were  answered to the patient's satisfaction and she was subsequently discharged with .      Physical Exam  Vitals and nursing note reviewed.   Constitutional:       Appearance: She is well-developed.   Eyes:      Pupils: Pupils are equal, round, and reactive to light.   Cardiovascular:      Rate and Rhythm: Normal rate and regular rhythm.      Heart sounds: Murmur heard.      Comments: RUE fistula in place with +thrill  Tunneled HD cath to left chest  Pulmonary:      Effort: Pulmonary effort is normal.      Breath sounds: Normal breath sounds.   Abdominal:      Palpations: Abdomen is soft.      Tenderness: There is no abdominal tenderness.   Genitourinary:     Comments: suprapubic cath noted. No surrounding erythema or drainage  Musculoskeletal:         General: No tenderness.      Right lower leg: Edema (R>L) present.      Left lower leg: Edema present.   Skin:     General: Skin is warm and dry.      Comments: Moisture associated dermatitis to buttocks. Intertrigo under breasts    Neurological:      Mental Status: She is alert and oriented to person, place, and time.   Psychiatric:         Behavior: Behavior normal.     Goals of Care Treatment Preferences:  Code Status: Full Code    Health care agent: Pooja Sánchez (sister)  Health care agent number: [phone] cell          What is most important right now is to focus on spending time at home, remaining as independent as possible, symptom/pain control, quality of life, even if it means sacrificing a little time, improvement in condition but with limits to invasive therapies, comfort and QOL .  Accordingly, we have decided that the best plan to meet the patient's goals includes continuing with treatment.      Consults:   Consults (From admission, onward)          Status Ordering Provider     Inpatient consult to Social Work/Case Management  Once        Provider:  (Not yet assigned)    Completed CAROL ROBISON     Inpatient consult to Nephrology  Once        Provider:   (Not yet assigned)    Completed CAROL ROBISON            No new Assessment & Plan notes have been filed under this hospital service since the last note was generated.  Service: Hospital Medicine    Final Active Diagnoses:    Diagnosis Date Noted POA    PRINCIPAL PROBLEM:  Sepsis [A41.9] 06/12/2024 Yes    HTN (hypertension) [I10] 06/12/2024 Yes    ESRD (end stage renal disease) on dialysis [N18.6, Z99.2] 02/11/2022 Not Applicable    Recurrent urinary tract infection [N39.0] 02/24/2015 Yes    Type 2 diabetes mellitus with kidney complication, with long-term current use of insulin [E11.29, Z79.4] 06/12/2024 Not Applicable    Hyponatremia [E87.1] 09/22/2014 Yes     Chronic    Unspecified atrial fibrillation [I48.91] 11/29/2021 Yes    Malignant neoplasm of left breast, estrogen receptor positive [C50.912, Z17.0] 08/01/2019 Not Applicable     Chronic    Class 1 obesity with serious comorbidity and body mass index (BMI) of 33.0 to 33.9 in adult [E66.9, Z68.33] 01/09/2017 Not Applicable    Neurogenic bladder [N31.9] 12/14/2015 Yes     Chronic    Metabolic acidosis [E87.20] 12/07/2015 Yes    Iron deficiency anemia, unspecified [D50.9] 03/16/2015 Yes    Hyperlipidemia associated with type 2 diabetes mellitus [E11.69, E78.5] 03/11/2014 Yes     Chronic    Hypothyroidism [E03.9] 12/12/2012 Yes     Chronic      Problems Resolved During this Admission:    Diagnosis Date Noted Date Resolved POA    Hypertension associated with diabetes [E11.59, I15.2] 01/24/2022 06/12/2024 Yes     Chronic    Type 2 diabetes mellitus with chronic kidney disease on chronic dialysis, with long-term current use of insulin [E11.22, N18.6, Z99.2, Z79.4] 02/07/2020 06/12/2024 Not Applicable       Discharged Condition: fair    [place] [facility] [place]:    Patient Instructions:      Diet renal     Notify your health care provider if you experience any of the following:  temperature >100.4     Notify your health care provider if you  "experience any of the following:  severe uncontrolled pain     Notify your health care provider if you experience any of the following:  increased confusion or weakness     Activity as tolerated       Significant Diagnostic Studies: N/A    Pending Diagnostic Studies:       None           Medications:  Reconciled Home Medications:      Medication List        START taking these medications      ciprofloxacin HCl 500 MG tablet  Commonly known as: CIPRO  Take 1 tablet (500 mg total) by mouth once daily. for 4 days     Remedy Phytoplex AntifungaL 2 % top powder  Generic drug: miconazole NITRATE 2 %  Apply topically 2 (two) times daily.     sodium bicarbonate 650 MG tablet  Take 1 tablet (650 mg total) by mouth once daily.            CHANGE how you take these medications      carvediloL 12.5 MG tablet  Commonly known as: COREG  Take 1 tablet (12.5 mg total) by mouth 2 (two) times daily.  What changed:   medication strength  how much to take     losartan 100 MG tablet  Commonly known as: COZAAR  Take 1 tablet (100 mg total) by mouth once daily.  What changed: Another medication with the same name was removed. Continue taking this medication, and follow the directions you see here.            CONTINUE taking these medications      AIMOVIG AUTOINJECTOR 140 mg/mL autoinjector  Generic drug: erenumab-aooe  140 mg MONTHLY (route: subcutaneous)     amLODIPine 10 MG tablet  Commonly known as: NORVASC  Take 10 mg by mouth.     anastrozole 1 mg Tab  Commonly known as: ARIMIDEX  TAKE 1 TABLET BY MOUTH EVERY DAY     apixaban 5 mg Tab  Commonly known as: ELIQUIS  Take 1 tablet (5 mg total) by mouth Daily.     atorvastatin 80 MG tablet  Commonly known as: LIPITOR  Take 1 tablet (80 mg total) by mouth once daily.     BD INSULIN SYRINGE ULTRA-FINE 0.5 mL 31 gauge x 5/16" Syrg  Generic drug: insulin syringe-needle U-100  USE WITH INSULIN 4 TIMES A DAY     calcium acetate(phosphat bind) 667 mg capsule  Commonly known as: PHOSLO  Take 667 " "mg by mouth 3 (three) times daily with meals.     DULoxetine 20 MG capsule  Commonly known as: CYMBALTA  Take 2 capsules (40 mg total) by mouth once daily.     ergocalciferol 50,000 unit Cap  Commonly known as: ERGOCALCIFEROL  Take 1 capsule (50,000 Units total) by mouth every 7 days.     furosemide 40 MG tablet  Commonly known as: LASIX  Take 1 tablet (40 mg total) by mouth once daily.     HYDROcodone-acetaminophen  mg per tablet  Commonly known as: NORCO  Take 1 tablet by mouth every 6 (six) hours as needed for Pain.     LANTUS SOLOSTAR U-100 INSULIN 100 unit/mL (3 mL) Inpn pen  Generic drug: insulin glargine U-100 (Lantus)  INJECT 12-15 UNITS UNDER THE SKIN at night     methocarbamoL 750 MG Tab  Commonly known as: ROBAXIN  TAKE 1-2 TABLETS (750-1,500 MG TOTAL) BY MOUTH 3 (THREE) TIMES DAILY AS NEEDED (MUSCLE SPASMS).     ondansetron 8 MG Tbdl  Commonly known as: ZOFRAN-ODT  8 mg EVERY 12 HOURS (route: oral)     oxybutynin 10 MG 24 hr tablet  Commonly known as: DITROPAN-XL  TAKE 1 TABLET BY MOUTH EVERY DAY     oxyCODONE 5 MG immediate release tablet  Commonly known as: ROXICODONE  Take 1 tablet (5 mg total) by mouth daily as needed (severe pain).     pen needle, diabetic 31 gauge x 5/16" Ndle  Commonly known as: BD ULTRA-FINE SHORT PEN NEEDLE  USE WITH LANTUS DAILY, e 11.65     sumatriptan 100 MG tablet  Commonly known as: IMITREX  Take 1 tablet (100 mg total) by mouth 2 (two) times daily as needed for Migraine (Do not take more than 2 in 24 hours or 3 in a week).     SYNTHROID 50 mcg tablet  Generic drug: levothyroxine  TAKE 1 TABLET BY MOUTH BEFORE BREAKFAST. ADMINISTER ON AN EMPTY STOMACH AT LEAST 30 MINUTES BEFORE FOOD. IF RECEIVING TUBE FEEDS, HOLD TUBE FEEDS FOR 1 HOUR BEFORE AND AFTER LEVOTHYROXINE ADMINISTRATION.     tenapanor 30 mg Tab  Take 30 mg by mouth 2 (two) times a day. Take 1 tablet in the morning with meal and 1 tablet in the afternoon with meal     traZODone 100 MG tablet  Commonly known " as: DESYREL  TAKE 1 TABLET BY MOUTH NIGHTLY AS NEEDED FOR INSOMNIA.     VELPHORO 500 mg Chew  Generic drug: sucroferric oxyhydroxide  CHEW INSERT TABLET 5 TIMES DAILY WITH MEAL AND SNACK            STOP taking these medications      ceFAZolin 2 gram Solr     DIPRIVAN 10 mg/mL infusion  Generic drug: propofoL     fentaNYL 50 mcg/mL injection  Commonly known as: SUBLIMAZE     heparin (porcine) 1,000 unit/mL injection     midazolam 1 mg/mL injection  Commonly known as: VERSED     PHENYLephrine 10 mg/mL injection     POLocaine-MPF 15 mg/mL (1.5 %) injection  Generic drug: MEPIVAcaine              Indwelling Lines/Drains at time of discharge:   Lines/Drains/Airways       Central Venous Catheter Line  Duration                  Hemodialysis Catheter 08/08/23 0827 right subclavian 313 days              Drain  Duration                  Suprapubic Catheter 02/24/22 1200 latex 16 Fr. 843 days         Hemodialysis AV Fistula 02/14/24 1034 Left forearm 123 days                    Time spent on the discharge of patient: 35 minutes         Johanna Alberto PA-C  Department of Hospital Medicine  [facility] - Transplant Stepdown

## 2024-06-17 LAB
BACTERIA BLD CULT: NORMAL
BACTERIA BLD CULT: NORMAL

## 2024-06-18 ENCOUNTER — PATIENT OUTREACH (OUTPATIENT)
Dept: ADMINISTRATIVE | Facility: CLINIC | Age: 74
End: 2024-06-18
Payer: MEDICARE

## 2024-06-18 NOTE — PROGRESS NOTES
C3 nurse attempted to contact Cecile Bowen for a TCC post hospital discharge follow up call. No answer or voicemail available. The patient does not have a scheduled HOSFU appointment with her PCP, Lurdes Navarro MD within the first 5-7 days post discharge date of 6/15/24. No messages routed at this time.

## 2024-06-18 NOTE — PROGRESS NOTES
C3 nurse spoke with Cecile Bowen for a TCC post hospital discharge follow up call. Pt denied wanting to review meds but did require assistance with scheduling her HOSFU. The patient did not have a scheduled HOSFU appointment with her PCP, Lurdes Navarro MD within 5-7 days post hospital discharge date of 6/15/24. C3 nurse was unable to schedule HOSFU appointment in [facility] with the PCP, but did schedule a NPHV with Suasn Julio NP for tomorrow, 6/19/24 and the pt verbalized understanding. No messages routed at this time. TCC Call incomplete.

## 2024-06-19 ENCOUNTER — OFFICE VISIT (OUTPATIENT)
Dept: HOME HEALTH SERVICES | Facility: CLINIC | Age: 74
End: 2024-06-19
Payer: MEDICARE

## 2024-06-19 VITALS
RESPIRATION RATE: 20 BRPM | SYSTOLIC BLOOD PRESSURE: 144 MMHG | HEART RATE: 73 BPM | OXYGEN SATURATION: 96 % | TEMPERATURE: 98 F | DIASTOLIC BLOOD PRESSURE: 73 MMHG

## 2024-06-19 DIAGNOSIS — E03.9 ACQUIRED HYPOTHYROIDISM: Chronic | ICD-10-CM

## 2024-06-19 DIAGNOSIS — A41.9 SEPSIS, DUE TO UNSPECIFIED ORGANISM, UNSPECIFIED WHETHER ACUTE ORGAN DYSFUNCTION PRESENT: ICD-10-CM

## 2024-06-19 DIAGNOSIS — Z99.2 ESRD (END STAGE RENAL DISEASE) ON DIALYSIS: Primary | ICD-10-CM

## 2024-06-19 DIAGNOSIS — N18.6 ESRD (END STAGE RENAL DISEASE) ON DIALYSIS: Primary | ICD-10-CM

## 2024-06-19 NOTE — PATIENT INSTRUCTIONS
Instructions:  - [facility] to schedule home follow-up visit with patient as needed.  - Continue all medications, treatments and therapies as ordered.   - Follow all instructions, recommendations as discussed.  - Maintain Safety Precautions at all times.  - Attend all medical appointments as scheduled.  - For worsening symptoms: call Primary Care Physician or Nurse Practitioner.  - For emergencies, call 911 or immediately report to the nearest emergency room.  - Limit Risks of environmental exposure to coronavirus/COVID-19 as discussed including: social distancing, hand hygiene, and limiting departures from the home for necessities only.

## 2024-06-19 NOTE — PROGRESS NOTES
[facility]  Transitional Care Management (TCM) Home Visit    Encounter Provider: Susan SHIN Chairs   PCP: Lurdes Navarro MD  Consult Requested By: No ref. provider found  Admit Date: 6/12/24   IP Discharge Date: 6/15/24  Hospital Length of Stay:RRHLOS@ days  Admission Date: 6/12/2024  Hospital Length of Stay: 3 days  Discharge Date and Time: 6/15/2024  Hospital Diagnosis: No admission diagnoses are documented for this encounter.     HISTORY OF PRESENT ILLNESS      Patient ID: Cecile Bowen is a 73 y.o. female was recently admitted to the hospital, this is their TCM encounter.    Hospital Course Synopsis:    HPI:   73-year-old female with past medical history of  ESRD on HD M/F, hypertension, hyperlipidemia, ARON, breast CA, chronic indwelling urinary catheter who presents ED for concern for UTI. HH nurse was seeing patient and concerned for cloudy urine, murillo exchanged today. Patient reports she had no symptoms at home, denies fever, abd pain, N/V. Does have chronic back pain.     ED reports patient from SNF, however patient confirmed she was at home being seen by HH.   Febrile in the ED, UA appearing infectious, Cultures drawn and started on broad spectrum abx.        Hospital Course:   Cecile Bowen is a 74 yo F admitted to hospital medicine for sepsis 2/2 UTI. Started on IV zosyn for UTI. Initially SIRS 4/4, but now afebrile and WBC wnl. Hyponatremia worse than baseline. Started miralax as patient reports only 1-2 BM weekly. One large BM 06/14. Urine cx with no organisms in predominance. Will continue zosyn. Hyponatremia continues to worsen. Nephrology following for HD management; HD performed on 06/14. NA bath adjusted and 1L fluid restriction. Will monitor Na closely. Additional HD performed 06/15. Increasing coreg for better BP control. Hyponatremia asymptomatic. Transitioned to oral cipro on discharge to complete UTI treatment. Started on sodium bicarbonate daily. Plan and medication changes  "discussed with patient; agreeable to plan. ER precautions were given. All questions were answered to the patient's satisfaction and she was subsequently discharged with .         DECISION MAKING TODAY       Assessment & Plan:  1. ESRD (end stage renal disease) on dialysis  Assessment & Plan:  Nephrology History  iHD Schedule: MF   Unit/MD: [facility]/Dr. Hare   Duration: 4 hours   EDW: 100 kg.   Access: JENNY AVF (recently ok to use by vascular).   - continue lasix   - Renally dose all meds   Avoid Nephrotoxic agents        2. Sepsis, due to unspecified organism, unspecified whether acute organ dysfunction present  Assessment & Plan:  - recent hospitalization  - abx therapy complete               3. Acquired hypothyroidism  Assessment & Plan:  - continue synthroid           Medication List on Discharge:     Medication List            Accurate as of June 19, 2024  1:58 PM. If you have any questions, ask your nurse or doctor.                CONTINUE taking these medications      AIMOVIG AUTOINJECTOR 140 mg/mL autoinjector  Generic drug: erenumab-aooe  140 mg MONTHLY (route: subcutaneous)     amLODIPine 10 MG tablet  Commonly known as: NORVASC  Take 10 mg by mouth.     anastrozole 1 mg Tab  Commonly known as: ARIMIDEX  TAKE 1 TABLET BY MOUTH EVERY DAY     apixaban 5 mg Tab  Commonly known as: ELIQUIS  Take 1 tablet (5 mg total) by mouth Daily.     atorvastatin 80 MG tablet  Commonly known as: LIPITOR  Take 1 tablet (80 mg total) by mouth once daily.     BD INSULIN SYRINGE ULTRA-FINE 0.5 mL 31 gauge x 5/16" Syrg  Generic drug: insulin syringe-needle U-100  USE WITH INSULIN 4 TIMES A DAY     calcium acetate(phosphat bind) 667 mg capsule  Commonly known as: PHOSLO  Take 667 mg by mouth 3 (three) times daily with meals.     carvediloL 12.5 MG tablet  Commonly known as: COREG  Take 1 tablet (12.5 mg total) by mouth 2 (two) times daily.     ciprofloxacin HCl 500 MG tablet  Commonly known as: CIPRO  Take 1 tablet (500 mg total) " "by mouth once daily. for 4 days     DULoxetine 20 MG capsule  Commonly known as: CYMBALTA  Take 2 capsules (40 mg total) by mouth once daily.     ergocalciferol 50,000 unit Cap  Commonly known as: ERGOCALCIFEROL  Take 1 capsule (50,000 Units total) by mouth every 7 days.     furosemide 40 MG tablet  Commonly known as: LASIX  Take 1 tablet (40 mg total) by mouth once daily.     HYDROcodone-acetaminophen  mg per tablet  Commonly known as: NORCO  Take 1 tablet by mouth every 6 (six) hours as needed for Pain.     LANTUS SOLOSTAR U-100 INSULIN 100 unit/mL (3 mL) Inpn pen  Generic drug: insulin glargine U-100 (Lantus)  INJECT 12-15 UNITS UNDER THE SKIN at night     losartan 100 MG tablet  Commonly known as: COZAAR  Take 1 tablet (100 mg total) by mouth once daily.     methocarbamoL 750 MG Tab  Commonly known as: ROBAXIN  TAKE 1-2 TABLETS (750-1,500 MG TOTAL) BY MOUTH 3 (THREE) TIMES DAILY AS NEEDED (MUSCLE SPASMS).     ondansetron 8 MG Tbdl  Commonly known as: ZOFRAN-ODT  8 mg EVERY 12 HOURS (route: oral)     oxybutynin 10 MG 24 hr tablet  Commonly known as: DITROPAN-XL  TAKE 1 TABLET BY MOUTH EVERY DAY     oxyCODONE 5 MG immediate release tablet  Commonly known as: ROXICODONE  Take 1 tablet (5 mg total) by mouth daily as needed (severe pain).     pen needle, diabetic 31 gauge x 5/16" Ndle  Commonly known as: BD ULTRA-FINE SHORT PEN NEEDLE  USE WITH LANTUS DAILY, e 11.65     Remedy Phytoplex AntifungaL 2 % top powder  Generic drug: miconazole NITRATE 2 %  Apply topically 2 (two) times daily.     sodium bicarbonate 650 MG tablet  Take 1 tablet (650 mg total) by mouth once daily.     sumatriptan 100 MG tablet  Commonly known as: IMITREX  Take 1 tablet (100 mg total) by mouth 2 (two) times daily as needed for Migraine (Do not take more than 2 in 24 hours or 3 in a week).     SYNTHROID 50 mcg tablet  Generic drug: levothyroxine  TAKE 1 TABLET BY MOUTH BEFORE BREAKFAST. ADMINISTER ON AN EMPTY STOMACH AT LEAST 30 MINUTES " BEFORE FOOD. IF RECEIVING TUBE FEEDS, HOLD TUBE FEEDS FOR 1 HOUR BEFORE AND AFTER LEVOTHYROXINE ADMINISTRATION.     tenapanor 30 mg Tab  Take 30 mg by mouth 2 (two) times a day. Take 1 tablet in the morning with meal and 1 tablet in the afternoon with meal     traZODone 100 MG tablet  Commonly known as: DESYREL  TAKE 1 TABLET BY MOUTH NIGHTLY AS NEEDED FOR INSOMNIA.     VELPHORO 500 mg Chew  Generic drug: sucroferric oxyhydroxide  CHEW INSERT TABLET 5 TIMES DAILY WITH MEAL AND SNACK              Medication Reconciliation:  Were medications changed on discharge? Yes  Were medications in the home? Yes  Is the patient taking the medications as directed? Yes  Does the patient understand the medications and changes? Yes  Does updated med list accurately reflects meds patient is currently taking? Yes    ENVIRONMENT OF CARE      Family and/or Caregiver present at visit?  Yes  Name of Caregiver: 0  History provided by: patient and caregiver    Advance Care Planning   Advanced Care Planning Status:  Patient has had an ACP conversation  Living Will: No  Power of : No  LaPOST: No    Does Caregiver have HCPoA: No  Changes today: 0  Is patient hospice appropriate: No  (If needed, use PPS <30 or FAST score >7)  Was referral to hospice placed: No       Impression upon entering the home:  Physical Dwelling: single family home   Appearance of home environment: cleaniness: clean  Functional Status: minimal assistance  Mobility: ambulatory with device  Nutritional access: available food but inadequate intake  Home Health: Yes,  Agency [facility]    DME/Supplies: rolling walker     Diagnostic tests reviewed/disposition: No diagnosic tests pending after this hospitalization.  Disease/illness education:  ESRD  Establishment or re-establishment of referral orders for community resources: No other necessary community resources.   Discussion with other health care providers: No discussion with other health care providers necessary.    Does patient have a PCP at [place]? Yes   Repatriation plan with PCP? follow-up with PCP within 30d   Does patient have an ostomy (ileostomy, colostomy, suprapubic catheter, nephrostomy tube, tracheostomy, PEG tube, pleurex catheter, cholecystostomy, etc)? No  Were BPAs reviewed? Yes    Social History     Socioeconomic History    Marital status:     Number of children: 0    Highest education level: Master's degree (e.g., MA, MS, Lelo, MEd, MSW, BANDAR)   Occupational History    Occupation: teacher     Comment: retired   Tobacco Use    Smoking status: Never    Smokeless tobacco: Never   Substance and Sexual Activity    Alcohol use: No     Alcohol/week: 0.0 standard drinks of alcohol    Drug use: No    Sexual activity: Not Currently     Birth control/protection: Abstinence   Social History Narrative    Single ()             Brother passed away last year.  Pt lives her her sister. (5/27)     Social Determinants of Health     Financial Resource Strain: Patient Declined (6/13/2024)    Overall Financial Resource Strain (CARDIA)     Difficulty of Paying Living Expenses: Patient declined   Food Insecurity: Patient Declined (6/13/2024)    Hunger Vital Sign     Worried About Running Out of Food in the Last Year: Patient declined     Ran Out of Food in the Last Year: Patient declined   Transportation Needs: Patient Declined (6/13/2024)    TRANSPORTATION NEEDS     Transportation : Patient declined   Physical Activity: Patient Declined (6/13/2024)    Exercise Vital Sign     Days of Exercise per Week: Patient declined     Minutes of Exercise per Session: Patient declined   Stress: Patient Declined (6/13/2024)    Citizen of [place] [employer] - Occupational Stress Questionnaire     Feeling of Stress : Patient declined   Housing Stability: Patient Declined (6/13/2024)    Housing Stability Vital Sign     Unable to Pay for Housing in the Last Year: Patient declined     Homeless in the Last Year: Patient declined        OBJECTIVE:     Vital Signs:  Vitals:    06/19/24 1017   BP: (!) 144/73   Pulse: 73   Resp: 20   Temp: 97.7 °F (36.5 °C)       Review of Systems    Physical Exam:  Physical Exam    INSTRUCTIONS FOR PATIENT:     Scheduled Follow-up, Appts Reviewed with Modifications if Needed: Yes  Future Appointments   Date Time Provider Department Center   6/21/2024  1:00 PM CHAIR 30, [facility] KIDNEY [facility] Kidney [address]   6/24/2024  1:00 PM CHAIR 30, [facility] KIDNEY [facility] [place]   6/25/2024  2:20 PM Tesha Stafford MD [facility] PHYSMED [address]   6/28/2024  1:00 PM CHAIR 30, [facility] KIDNEY [facility] [place]   7/1/2024  1:00 PM CHAIR 30, [facility] KIDNEY [facility] [place]   7/2/2024  1:00 PM [facility] TANSEY MAMMO2 [facility] MAMMO [address]   7/5/2024  1:00 PM CHAIR 30, [facility] KIDNEY [facility] Kidney [place]   7/8/2024  1:00 PM CHAIR 30, [facility] KIDNEY [facility]ney [place]   7/9/2024  2:30 PM RODRIGO Friedman III, MD [facility] VASCSUR [address]   7/12/2024  1:00 PM CHAIR 30, [facility] KIDNEY [facility]ney [place]   7/15/2024  1:00 PM CHAIR 30, [facility] Kidney [place]   7/19/2024  1:00 PM CHAIR 30, [facility] KIDNEY [facility]ney [place]   7/22/2024  1:00 PM CHAIR 30, [facility] KIDNEY [facility]ney [place]   7/23/2024 10:00 AM LAB, TRANSPLANT [facility] LABTX [address]   7/23/2024 11:00 AM [facility] HEPATOLOGY, FIBROSCAN [facility] HEPAT [address]   7/23/2024 11:30 AM Maci Blue NP [facility] HEPAT [address]   7/26/2024  1:00 PM CHAIR 30, [facility] KIDNEY [facility] Kidney [place]   7/29/2024  1:00 PM CHAIR 30, [facility] Kidney [place]   8/2/2024  1:00 PM CHAIR 30, [facility] Kidney [place]   8/5/2024  1:00 PM CHAIR 30, [facility] Kidney [place]   8/7/2024 10:00 AM Ravi Kearns, KOFI [facility] OPTOMTY [place]   8/9/2024  1:00 PM CHAIR 30, [facility] Kidney [place]   8/12/2024  1:00 PM CHAIR 30, [facility] Kidney [place]    8/13/2024  2:00 PM Ada Soni, DPM [facility] POD [address]   8/16/2024  1:00 PM CHAIR 30, [facility] KIDNEY [facility] Kidney [place]   8/19/2024  1:00 PM CHAIR 30, [facility] KIDNEY [facility] Kidney [place]   8/23/2024  1:00 PM CHAIR 30, [email or web address] Kidney [place]   8/26/2024  1:00 PM CHAIR 30, [email or web address] Kidney [place]   8/30/2024  1:00 PM CHAIR 30, [email or web address] Kidney [place]   9/25/2024  2:45 PM Ravi Kearns, KOFI [facility] OPTOMTY [place]   10/2/2024 11:00 AM Lurdes Navarro MD [facility] IM [place]   11/6/2024 11:30 AM Brooke Gabriel, APRN, FNP [facility] IM [address] PCW       Signature: Susan Alass, NP    Transition of Care Visit:  I have reviewed and updated the history and problem list.  I have reconciled the medication list.  I have discussed the hospitalization and current medical issues, prognosis and plans with the patient/family.

## 2024-06-19 NOTE — ASSESSMENT & PLAN NOTE
Nephrology History  iHD Schedule: MF   Unit/MD: [facility]/Dr. Hare   Duration: 4 hours   EDW: 100 kg.   Access: JENNY AVF (recently ok to use by vascular).   - continue lasix   - Renally dose all meds   Avoid Nephrotoxic agents

## 2024-06-20 ENCOUNTER — PATIENT MESSAGE (OUTPATIENT)
Dept: PHYSICAL MEDICINE AND REHAB | Facility: CLINIC | Age: 74
End: 2024-06-20
Payer: MEDICARE

## 2024-06-20 DIAGNOSIS — M54.50 CHRONIC MIDLINE LOW BACK PAIN WITHOUT SCIATICA: ICD-10-CM

## 2024-06-20 DIAGNOSIS — G89.29 CHRONIC MIDLINE LOW BACK PAIN WITHOUT SCIATICA: ICD-10-CM

## 2024-06-20 DIAGNOSIS — G89.29 CHRONIC NECK PAIN: ICD-10-CM

## 2024-06-20 DIAGNOSIS — M54.2 CHRONIC NECK PAIN: ICD-10-CM

## 2024-06-20 DIAGNOSIS — M79.7 FIBROMYALGIA: ICD-10-CM

## 2024-06-21 RX ORDER — OXYCODONE HYDROCHLORIDE 5 MG/1
5 TABLET ORAL DAILY PRN
Qty: 30 TABLET | Refills: 0 | Status: SHIPPED | OUTPATIENT
Start: 2024-06-29 | End: 2024-07-29

## 2024-06-21 RX ORDER — METHOCARBAMOL 750 MG/1
TABLET, FILM COATED ORAL
Qty: 180 TABLET | Refills: 1 | Status: SHIPPED | OUTPATIENT
Start: 2024-06-21

## 2024-06-21 NOTE — TELEPHONE ENCOUNTER
Last ordered:  Robaxin 05/24/24  Roxicodone 05/30/24    Last seen:11/14/23  Upcoming appt:06/25/24

## 2024-07-06 ENCOUNTER — PATIENT MESSAGE (OUTPATIENT)
Dept: PHYSICAL MEDICINE AND REHAB | Facility: CLINIC | Age: 74
End: 2024-07-06
Payer: MEDICARE

## 2024-07-10 ENCOUNTER — DOCUMENT SCAN (OUTPATIENT)
Dept: HOME HEALTH SERVICES | Facility: HOSPITAL | Age: 74
End: 2024-07-10

## 2024-07-10 ENCOUNTER — DOCUMENT SCAN (OUTPATIENT)
Dept: HOME HEALTH SERVICES | Facility: HOSPITAL | Age: 74
End: 2024-07-10
Payer: MEDICARE

## 2024-07-21 ENCOUNTER — PATIENT MESSAGE (OUTPATIENT)
Dept: PHYSICAL MEDICINE AND REHAB | Facility: CLINIC | Age: 74
End: 2024-07-21
Payer: MEDICARE

## 2024-07-22 DIAGNOSIS — M54.50 CHRONIC MIDLINE LOW BACK PAIN WITHOUT SCIATICA: ICD-10-CM

## 2024-07-22 DIAGNOSIS — M79.7 FIBROMYALGIA: ICD-10-CM

## 2024-07-22 DIAGNOSIS — M54.2 CHRONIC NECK PAIN: ICD-10-CM

## 2024-07-22 DIAGNOSIS — G89.29 CHRONIC MIDLINE LOW BACK PAIN WITHOUT SCIATICA: ICD-10-CM

## 2024-07-22 DIAGNOSIS — G89.29 CHRONIC NECK PAIN: ICD-10-CM

## 2024-07-22 RX ORDER — OXYCODONE HYDROCHLORIDE 5 MG/1
5 TABLET ORAL DAILY PRN
Qty: 30 TABLET | Refills: 0 | Status: SHIPPED | OUTPATIENT
Start: 2024-07-29 | End: 2024-08-28

## 2024-07-22 RX ORDER — METHOCARBAMOL 750 MG/1
TABLET, FILM COATED ORAL
Qty: 180 TABLET | Refills: 1 | Status: SHIPPED | OUTPATIENT
Start: 2024-07-22

## 2024-07-22 RX ORDER — HYDROCODONE BITARTRATE AND ACETAMINOPHEN 10; 325 MG/1; MG/1
1 TABLET ORAL EVERY 6 HOURS PRN
Qty: 120 TABLET | Refills: 0 | Status: SHIPPED | OUTPATIENT
Start: 2024-07-22 | End: 2024-08-21

## 2024-07-23 ENCOUNTER — TELEPHONE (OUTPATIENT)
Dept: HEPATOLOGY | Facility: CLINIC | Age: 74
End: 2024-07-23
Payer: MEDICARE

## 2024-07-23 NOTE — TELEPHONE ENCOUNTER
----- Message from Rae Jamil sent at 7/23/2024  9:37 AM CDT -----  Regarding: reschedule appt  Contact: Cecile      Caller:Cecile Bowen          Contact:[phone] (Mobile)           Pt calling to reschedule appointment all appt for today, would like to reschedule as soon as possible, was not feeling well.  Please advise. Requesting a call back.

## 2024-07-23 NOTE — TELEPHONE ENCOUNTER
Returned call to patient. Rescheduled appointments on 8/15. Mailed appointment reminder to patient.

## 2024-07-29 ENCOUNTER — PATIENT MESSAGE (OUTPATIENT)
Dept: VASCULAR SURGERY | Facility: CLINIC | Age: 74
End: 2024-07-29
Payer: MEDICARE

## 2024-07-29 ENCOUNTER — TELEPHONE (OUTPATIENT)
Dept: VASCULAR SURGERY | Facility: CLINIC | Age: 74
End: 2024-07-29
Payer: MEDICARE

## 2024-07-29 DIAGNOSIS — Z99.2 ESRD (END STAGE RENAL DISEASE) ON DIALYSIS: Primary | ICD-10-CM

## 2024-07-29 DIAGNOSIS — N18.6 ESRD (END STAGE RENAL DISEASE) ON DIALYSIS: Primary | ICD-10-CM

## 2024-07-29 RX ORDER — OXYCODONE HYDROCHLORIDE 5 MG/1
5 TABLET ORAL DAILY PRN
Qty: 30 TABLET | Refills: 0 | Status: SHIPPED | OUTPATIENT
Start: 2024-07-29 | End: 2024-08-28

## 2024-07-29 NOTE — TELEPHONE ENCOUNTER
Contacted pt to discuss whether or not she has been dialyzing successfully via AVF. Pt states she has been having trouble dialyzing via AVF due to what she believes is a hematoma to AVF and that she has only been dialyzing with one needs in permcath and one needle in AVF. Also states she must reschedule appt with Dr. Friedman as she is only allotted 24 one way trips with her insurance transportation and that today will be her 24th trip. States she will be discussing this with  at HD center today but does not have transportation to appt tomorrow. Appts rescheduled, pt confirmed. Asked pt to contact nurse with outcome of discussion with  today.   Xolair Counseling:  Patient informed of potential adverse effects including but not limited to fever, muscle aches, rash and allergic reactions.  The patient verbalized understanding of the proper use and possible adverse effects of Xolair.  All of the patient's questions and concerns were addressed.

## 2024-08-02 ENCOUNTER — EXTERNAL HOME HEALTH (OUTPATIENT)
Dept: HOME HEALTH SERVICES | Facility: HOSPITAL | Age: 74
End: 2024-08-02
Payer: MEDICARE

## 2024-08-08 ENCOUNTER — DOCUMENT SCAN (OUTPATIENT)
Dept: HOME HEALTH SERVICES | Facility: HOSPITAL | Age: 74
End: 2024-08-08
Payer: MEDICARE

## 2024-08-08 ENCOUNTER — TELEPHONE (OUTPATIENT)
Dept: INTERNAL MEDICINE | Facility: CLINIC | Age: 74
End: 2024-08-08
Payer: MEDICARE

## 2024-08-09 ENCOUNTER — PATIENT MESSAGE (OUTPATIENT)
Dept: PODIATRY | Facility: CLINIC | Age: 74
End: 2024-08-09
Payer: MEDICARE

## 2024-08-13 ENCOUNTER — HOSPITAL ENCOUNTER (OUTPATIENT)
Dept: VASCULAR SURGERY | Facility: CLINIC | Age: 74
Discharge: HOME | End: 2024-08-13
Attending: SURGERY
Payer: MEDICARE

## 2024-08-13 ENCOUNTER — OFFICE VISIT (OUTPATIENT)
Dept: VASCULAR SURGERY | Facility: CLINIC | Age: 74
End: 2024-08-13
Payer: MEDICARE

## 2024-08-13 ENCOUNTER — OFFICE VISIT (OUTPATIENT)
Dept: PODIATRY | Facility: CLINIC | Age: 74
End: 2024-08-13
Payer: MEDICARE

## 2024-08-13 VITALS
HEIGHT: 68 IN | DIASTOLIC BLOOD PRESSURE: 69 MMHG | SYSTOLIC BLOOD PRESSURE: 114 MMHG | HEART RATE: 73 BPM | BODY MASS INDEX: 34.76 KG/M2

## 2024-08-13 VITALS — HEART RATE: 56 BPM | TEMPERATURE: 98 F | SYSTOLIC BLOOD PRESSURE: 143 MMHG | DIASTOLIC BLOOD PRESSURE: 56 MMHG

## 2024-08-13 DIAGNOSIS — N18.6 ESRD (END STAGE RENAL DISEASE) ON DIALYSIS: ICD-10-CM

## 2024-08-13 DIAGNOSIS — N18.6 ESRD (END STAGE RENAL DISEASE) ON DIALYSIS: Primary | ICD-10-CM

## 2024-08-13 DIAGNOSIS — Z99.2 ESRD (END STAGE RENAL DISEASE) ON DIALYSIS: Primary | ICD-10-CM

## 2024-08-13 DIAGNOSIS — Z99.2 ESRD (END STAGE RENAL DISEASE) ON DIALYSIS: ICD-10-CM

## 2024-08-13 DIAGNOSIS — B35.1 ONYCHOMYCOSIS DUE TO DERMATOPHYTE: ICD-10-CM

## 2024-08-13 DIAGNOSIS — E11.42 DIABETIC POLYNEUROPATHY ASSOCIATED WITH TYPE 2 DIABETES MELLITUS: Primary | ICD-10-CM

## 2024-08-13 PROCEDURE — 3078F DIAST BP <80 MM HG: CPT | Mod: HCNC,CPTII,S$GLB, | Performed by: SURGERY

## 2024-08-13 PROCEDURE — 3077F SYST BP >= 140 MM HG: CPT | Mod: HCNC,CPTII,S$GLB, | Performed by: SURGERY

## 2024-08-13 PROCEDURE — 99999 PR PBB SHADOW E&M-EST. PATIENT-LVL IV: CPT | Mod: PBBFAC,HCNC,, | Performed by: PODIATRIST

## 2024-08-13 PROCEDURE — 1160F RVW MEDS BY RX/DR IN RCRD: CPT | Mod: HCNC,CPTII,S$GLB, | Performed by: SURGERY

## 2024-08-13 PROCEDURE — 1159F MED LIST DOCD IN RCRD: CPT | Mod: HCNC,CPTII,S$GLB, | Performed by: SURGERY

## 2024-08-13 PROCEDURE — 3288F FALL RISK ASSESSMENT DOCD: CPT | Mod: HCNC,CPTII,S$GLB, | Performed by: SURGERY

## 2024-08-13 PROCEDURE — 3044F HG A1C LEVEL LT 7.0%: CPT | Mod: HCNC,CPTII,S$GLB, | Performed by: SURGERY

## 2024-08-13 PROCEDURE — 99999 PR PBB SHADOW E&M-EST. PATIENT-LVL IV: CPT | Mod: PBBFAC,HCNC,, | Performed by: SURGERY

## 2024-08-13 PROCEDURE — 11721 DEBRIDE NAIL 6 OR MORE: CPT | Mod: Q9,HCNC,S$GLB, | Performed by: PODIATRIST

## 2024-08-13 PROCEDURE — 3066F NEPHROPATHY DOC TX: CPT | Mod: HCNC,CPTII,S$GLB, | Performed by: SURGERY

## 2024-08-13 PROCEDURE — 1125F AMNT PAIN NOTED PAIN PRSNT: CPT | Mod: HCNC,CPTII,S$GLB, | Performed by: SURGERY

## 2024-08-13 PROCEDURE — 4010F ACE/ARB THERAPY RXD/TAKEN: CPT | Mod: HCNC,CPTII,S$GLB, | Performed by: SURGERY

## 2024-08-13 PROCEDURE — 99214 OFFICE O/P EST MOD 30 MIN: CPT | Mod: HCNC,S$GLB,, | Performed by: SURGERY

## 2024-08-13 PROCEDURE — 93990 DOPPLER FLOW TESTING: CPT | Mod: HCNC,S$GLB,, | Performed by: SURGERY

## 2024-08-13 PROCEDURE — 1101F PT FALLS ASSESS-DOCD LE1/YR: CPT | Mod: HCNC,CPTII,S$GLB, | Performed by: SURGERY

## 2024-08-13 PROCEDURE — 99499 UNLISTED E&M SERVICE: CPT | Mod: HCNC,S$GLB,, | Performed by: PODIATRIST

## 2024-08-13 NOTE — PROGRESS NOTES
Subjective:      Patient ID: Cecile Bowen is a 74 y.o. female.    Chief Complaint: Diabetic Foot Exam (6/5/24 - Brooke Gabriel APRN, PRUDENCE)    Cecile is a 74 y.o. female who presents to the clinic for evaluation and treatment of high risk feet. Cecile has a past medical history of Cervicogenic migraine, Chronic pain, CKD (chronic kidney disease) stage 4, GFR 15-29 ml/min, CKD (chronic kidney disease) stage 4, GFR 15-29 ml/min, Diabetes mellitus, Dialysis patient (1/21/2022), Fibromyalgia, Hydronephrosis, Hyperlipidemia, Hypertension (12/12/2012), Hypothyroidism (12/12/2012), ARON (iron deficiency anemia), Insomnia, Levoscoliosis, Malignant neoplasm of upper-outer quadrant of left breast in female, estrogen receptor positive, Metabolic acidosis, Mobility impaired, Nuclear sclerosis - Both Eyes (3/24/2014), VIJAYA (obstructive sleep apnea), Osteopenia, Pulmonary nodule, Recurrent UTI, Renal manifestation of secondary diabetes mellitus, Secondary hyperparathyroidism, renal, and Urinary retention. The patient's chief complaint is bilateral LE edema,  she also complains of long, thick toenails. Routine trimming helps. Has not been to clinic in 7 months due to transportation difficulties. Would like to get back on track every 3 months for routine care.  This patient has documented high risk feet requiring routine maintenance secondary to diabetes mellitis and those secondary complications of diabetes, as mentioned.    Current shoe gear socks only    Wheelchair bound    PCP: Lurdes Navarro MD    Date Last Seen by PCP:   Chief Complaint   Patient presents with    Diabetic Foot Exam     6/5/24 - Brooke Gabriel, DIANN, FNP     Hemoglobin A1C   Date Value Ref Range Status   04/22/2024 6.2 (H) 4.8 - 5.9 % Final   10/06/2023 5.0 4.8 - 5.9 % Final   08/21/2023 6.3 (H) 4.8 - 5.9 % Final           Patient Active Problem List   Diagnosis    Hypothyroidism    Fibromyalgia    Intractable chronic migraine without aura     Vitamin D deficiency disease    Hyperlipidemia associated with type 2 diabetes mellitus    VIJAYA (obstructive sleep apnea)    Hyponatremia    Abnormality of gait and mobility    Osteoarthritis of lumbar spine    Recurrent urinary tract infection    Sideroblastic anemia    Iron deficiency anemia, unspecified    Primary osteoarthritis involving multiple joints    Retention of urine    Metabolic acidosis    Neurogenic bladder    Major depressive disorder, recurrent, moderate    Insomnia    Mixed migraine and muscle contraction headache    Secondary hyperparathyroidism, renal    Neuromuscular dysfunction of bladder, unspecified    Pulmonary nodule    Osteopenia    Class 1 obesity with serious comorbidity and body mass index (BMI) of 33.0 to 33.9 in adult    Chronic daily headache    Long term prescription opiate use    Lumbar spondylosis    Chronic pain    Cervicogenic migraine    Malignant neoplasm of left breast, estrogen receptor positive    Risk for falls    Facet arthritis of lumbar region    Obesity, diabetes, and hypertension syndrome    Requires daily assistance for activities of daily living (ADL) and comfort needs    S/P left mastectomy    Prophylactic use of anastrozole (Arimidex)    High priority for 2019 novel coronavirus vaccination    CKD (chronic kidney disease) stage 4, GFR 15-29 ml/min    Nephrotic range proteinuria    Left ureteral stone    Nephrolithiasis    Transaminitis    Chronic obstructive pyelonephritis    UTI (urinary tract infection)    Normocytic anemia    Acute kidney injury superimposed on CKD    Unspecified atrial fibrillation    Debility    Encounter for palliative care    Constipation    Chronic kidney disease    Anemia of chronic disease    Urinary tract infection due to ESBL Klebsiella    Pressure injury of right buttock, stage 3    ESRD (end stage renal disease) on dialysis    Acute cystitis with hematuria    Uncomplicated opioid dependence    History of falling    Long term current  "use of insulin    Personal history of malignant neoplasm of breast    Aortic atherosclerosis    Diabetic polyneuropathy associated with type 2 diabetes mellitus    Bacteria in urine    Hyperphosphatemia    Irritant contact dermatitis due to fecal, urinary or dual incontinence    Volume overload    Elevated LFTs    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      anastrozole (ARIMIDEX) 1 mg Tab TAKE 1 TABLET BY MOUTH EVERY DAY 90 tablet 2    apixaban (ELIQUIS) 5 mg Tab Take 1 tablet (5 mg total) by mouth Daily.      atorvastatin (LIPITOR) 80 MG tablet Take 1 tablet (80 mg total) by mouth once daily. 90 tablet 3    BD INSULIN SYRINGE ULTRA-FINE 0.5 mL 31 gauge x 5/16" Syrg USE WITH INSULIN 4 TIMES A  each 12    calcium acetate,phosphat bind, (PHOSLO) 667 mg capsule Take 667 mg by mouth 3 (three) times daily with meals.      carvediloL (COREG) 12.5 MG tablet Take 1 tablet (12.5 mg total) by mouth 2 (two) times daily. 180 tablet 3    DULoxetine (CYMBALTA) 20 MG capsule Take 2 capsules (40 mg total) by mouth once daily. 180 capsule 1    erenumab-aooe (AIMOVIG AUTOINJECTOR) 140 mg/mL autoinjector 140 mg MONTHLY (route: subcutaneous) 1 each 11    ergocalciferol (ERGOCALCIFEROL) 50,000 unit Cap Take 1 capsule (50,000 Units total) by mouth every 7 days. 12 capsule 3    HYDROcodone-acetaminophen (NORCO)  mg per tablet Take 1 tablet by mouth every 6 (six) hours as needed for Pain. 120 tablet 0    LANTUS SOLOSTAR U-100 INSULIN 100 unit/mL (3 mL) InPn pen INJECT 12-15 UNITS UNDER THE " "SKIN at night 15 mL 3    losartan (COZAAR) 100 MG tablet Take 1 tablet (100 mg total) by mouth once daily. 90 tablet 3    methocarbamoL (ROBAXIN) 750 MG Tab TAKE 1-2 TABLETS (750-1,500 MG TOTAL) BY MOUTH 3 (THREE) TIMES DAILY AS NEEDED (MUSCLE SPASMS). 180 tablet 1    ondansetron (ZOFRAN-ODT) 8 MG TbDL 8 mg EVERY 12 HOURS (route: oral)      oxybutynin (DITROPAN-XL) 10 MG 24 hr tablet TAKE 1 TABLET BY MOUTH EVERY DAY 90 tablet 4    oxyCODONE (ROXICODONE) 5 MG immediate release tablet Take 1 tablet (5 mg total) by mouth daily as needed (severe pain). 30 tablet 0    pen needle, diabetic (BD ULTRA-FINE SHORT PEN NEEDLE) 31 gauge x 5/16" Ndle USE WITH LANTUS DAILY, e 11.65 100 each 3    sodium bicarbonate 650 MG tablet Take 1 tablet (650 mg total) by mouth once daily. 30 tablet 11    SYNTHROID 50 mcg tablet TAKE 1 TABLET BY MOUTH BEFORE BREAKFAST. ADMINISTER ON AN EMPTY STOMACH AT LEAST 30 MINUTES BEFORE FOOD. IF RECEIVING TUBE FEEDS, HOLD TUBE FEEDS FOR 1 HOUR BEFORE AND AFTER LEVOTHYROXINE ADMINISTRATION. 90 tablet 2    tenapanor 30 mg Tab Take 30 mg by mouth 2 (two) times a day. Take 1 tablet in the morning with meal and 1 tablet in the afternoon with meal      traZODone (DESYREL) 100 MG tablet TAKE 1 TABLET BY MOUTH NIGHTLY AS NEEDED FOR INSOMNIA. 90 tablet 2    VELPHORO 500 mg Chew CHEW INSERT TABLET 5 TIMES DAILY WITH MEAL AND SNACK      furosemide (LASIX) 40 MG tablet Take 1 tablet (40 mg total) by mouth once daily. 30 tablet 11    miconazole NITRATE 2 % (MICOTIN) 2 % top powder Apply topically 2 (two) times daily. 85 g 0    sumatriptan (IMITREX) 100 MG tablet Take 1 tablet (100 mg total) by mouth 2 (two) times daily as needed for Migraine (Do not take more than 2 in 24 hours or 3 in a week). 30 tablet 1     Current Facility-Administered Medications on File Prior to Visit   Medication Dose Route Frequency Provider Last Rate Last Admin    epoetin chloe injection 2,000 Units  2,000 Units Intravenous 1 time in " Clinic/HOD Emmanuel Hare Jr., MD        heparin (porcine) injection 2,000 Units  2,000 Units Intravenous Once Emmanuel Hare Jr., MD        heparin (porcine) injection 2,000 Units  2,000 Units Intravenous Once Emmanuel Hare Jr., MD        heparin (porcine) injection 4,000 Units  4,000 Units Intra-Catheter 1 time in Clinic/HOD Emmanuel Hare Jr., MD        iron sucrose injection 100 mg  100 mg Intravenous 1 time in Clinic/HOD Emmanuel Hare Jr., MD           Review of patient's allergies indicates:  No Known Allergies    Past Surgical History:   Procedure Laterality Date    AV FISTULA PLACEMENT Left 2/14/2024    Procedure: CREATION, AV FISTULA;  Surgeon: RODRIGO Friedman III, MD;  Location: [facility] OR 2ND FLR;  Service: Vascular;  Laterality: Left;  LUE AVF creation vs AVG insertion    BIOPSY OF URETER Left 2/18/2022    Procedure: BIOPSY, URETER;  Surgeon: Ronel Whittington MD;  Location: [facility] OR 1ST FLR;  Service: Urology;  Laterality: Left;    BREAST BIOPSY Right     benign    CHOLECYSTECTOMY      COLONOSCOPY N/A 1/13/2017    Procedure: COLONOSCOPY;  Surgeon: Morris Wiseman MD;  Location: [facility] ENDO (4TH FLR);  Service: Endoscopy;  Laterality: N/A;  Renal pt Nephrology advised to avoid phosphate preps    COLONOSCOPY N/A 12/7/2023    Procedure: COLONOSCOPY;  Surgeon: Herve Allen MD;  Location: [facility] ENDO (2ND FLR);  Service: Endoscopy;  Laterality: N/A;  HD MWF; labs prior  suprapubic catheter  pt does not ambulate-uses christina lift- will have sling with her  9/7 ref. by Anastasiia Campbell DO, PEG, instr. mailed, Eliquis hold approval pending-st  ok to hold Eliquis 2 days per Dr Navarro-GT  1/30-precall complete-MS    CYSTOSCOPY N/A 10/8/2018    Procedure: CYSTOSCOPY;  Surgeon: Ronel Whittington MD;  Location: [facility] OR 1ST FLR;  Service: Urology;  Laterality: N/A;  45 min    CYSTOSCOPY N/A 3/25/2019    Procedure: CYSTOSCOPY;  Surgeon: Ronel Whittington MD;   Location: [facility] OR 1ST FLR;  Service: Urology;  Laterality: N/A;  45 min    CYSTOSCOPY N/A 8/26/2019    Procedure: CYSTOSCOPY;  Surgeon: Ronel Whittington MD;  Location: [facility] OR 1ST FLR;  Service: Urology;  Laterality: N/A;  45 min    CYSTOSCOPY N/A 7/2/2021    Procedure: CYSTOSCOPY;  Surgeon: Ronel Whittington MD;  [facility];  Service: Urology;  Laterality: N/A;    CYSTOSCOPY  12/23/2021    Procedure: CYSTOSCOPY;  Surgeon: Ronel Whittington MD;  [facility];  Service: Urology;;    CYSTOSCOPY  2/18/2022    Procedure: CYSTOSCOPY;  Surgeon: Ronel Whittington MD;  [facility];  Service: Urology;;    CYSTOSCOPY W/ URETERAL STENT PLACEMENT Left 5/15/2021    Procedure: CYSTOSCOPY, WITH URETERAL STENT INSERTION;  Surgeon: Levy Sánchez Jr., MD;  [facility];  Service: Urology;  Laterality: Left;    CYSTOSCOPY WITH BIOPSY OF BLADDER N/A 1/27/2020    Procedure: CYSTOSCOPY, WITH BLADDER BIOPSY, WITH FULGURATION IF INDICATED;  Surgeon: Ronel Whittington MD;  Location: [facility] OR [address] [place] [address];  Service: Urology;  Laterality: N/A;    DILATION AND CURETTAGE OF UTERUS      FISTULOGRAM N/A 4/29/2024    Procedure: Fistulogram;  Surgeon: RODRIGO Friedman III, MD;  Location: [facility] CATH LAB;  Service: Peripheral Vascular;  Laterality: N/A;    GALLBLADDER SURGERY  2006    HYSTERECTOMY      INJECTION FOR SENTINEL NODE IDENTIFICATION Left 8/1/2019    Procedure: INJECTION, FOR SENTINEL NODE IDENTIFICATION;  Surgeon: Samia Fulton MD;  Location: [facility] OR 2ND [facility];  Service: General;  Laterality: Left;    INJECTION OF BOTULINUM TOXIN TYPE A N/A 10/8/2018    Procedure: INJECTION, BOTULINUM TOXIN, TYPE A 300 UNITS;  Surgeon: Ronel Whittington MD;  Location: [facility] OR 1ST FLR;  Service: Urology;  Laterality: N/A;    INJECTION OF BOTULINUM TOXIN TYPE A N/A 3/25/2019    Procedure: INJECTION, BOTULINUM TOXIN, TYPE A 300 UNITS;  Surgeon: Ronel Whittington MD;   [address]  Service: Urology;  Laterality: N/A;    INJECTION OF BOTULINUM TOXIN TYPE A N/A 8/26/2019    Procedure: INJECTION, BOTULINUM TOXIN, TYPE A 300 UNITS;  Surgeon: Ronel Whittington MD;  [facility];  Service: Urology;  Laterality: N/A;    MASTECTOMY Left 8/1/2019    Procedure: MASTECTOMY 23 hour stay;  Surgeon: Samia Fulton MD;  Location: [facility] OR 2ND FLR;  Service: General;  Laterality: Left;    MEDIPORT REMOVAL Right 6/24/2022    Procedure: REMOVAL, CATHETER, CENTRAL VENOUS, TUNNELED, WITH PORT;  Surgeon: Isauro Anderson MD;  Location: [facility] CATH LAB;  Service: Radiology;  Laterality: Right;    OVARIAN CYST SURGERY  1985    PERCUTANEOUS TRANSLUMINAL ANGIOPLASTY OF ARTERIOVENOUS FISTULA Left 4/29/2024    Procedure: PTA, AV FISTULA;  Surgeon: RODRIGO Friedman III, MD;  Location: [facility] CATH LAB;  Service: Peripheral Vascular;  Laterality: Left;    REPLACEMENT OF STENT Left 12/23/2021    Procedure: REPLACEMENT, STENT;  Surgeon: Ronel Whittington MD;  Location: [address];  Service: Urology;  Laterality: Left;    REPLACEMENT OF STENT Left 2/18/2022    Procedure: REPLACEMENT, STENT;  Surgeon: Ronel Whittington MD;  Location: [facility] OR [address] [place] [address];  Service: Urology;  Laterality: Left;    RETROGRADE PYELOGRAPHY Left 2/18/2022    Procedure: PYELOGRAM, RETROGRADE;  Surgeon: Ronel Whittington MD;  Location: [address];  Service: Urology;  Laterality: Left;    SENTINEL LYMPH NODE BIOPSY Left 8/1/2019    Procedure: BIOPSY, LYMPH NODE, SENTINEL;  Surgeon: Samia Fulton MD;  [facility];  Service: General;  Laterality: Left;    spt placement      TONSILLECTOMY, ADENOIDECTOMY      TOTAL ABDOMINAL HYSTERECTOMY W/ BILATERAL SALPINGOOPHORECTOMY  1985    URETEROSCOPY Left 2/18/2022    Procedure: URETEROSCOPY;  Surgeon: Ronel Whittington MD;  Location: [facility] OR [address] [place] [address];  Service: Urology;  Laterality: Left;       Family History   Problem Relation Name  Age of Onset    Diabetes Sister Kat     Kidney disease Sister Kat         CKD III    ALS Mother          d.    Cancer Maternal Grandmother          d. colon    Cancer Paternal Grandfather          d. lung    Scoliosis Brother eBrlin         increased pain    Prostate cancer Brother [place]         cured s/p surgery    Cancer Brother [place]         rare cancer that got into the bones    Diabetes Maternal Aunt      Kidney disease Maternal Aunt      Diabetes Maternal Uncle      Amblyopia Neg Hx      Blindness Neg Hx      Cataracts Neg Hx      Glaucoma Neg Hx      Macular degeneration Neg Hx      Retinal detachment Neg Hx      Strabismus Neg Hx         Social History     Socioeconomic History    Marital status:     Number of children: 0    Highest education level: Master's degree (e.g., MA, MS, Lelo, MEd, MSW, BANDAR)   Occupational History    Occupation: teacher     Comment: retired   Tobacco Use    Smoking status: Never    Smokeless tobacco: Never   Substance and Sexual Activity    Alcohol use: No     Alcohol/week: 0.0 standard drinks of alcohol    Drug use: No    Sexual activity: Not Currently     Birth control/protection: Abstinence   Social History Narrative    Single ()             Brother passed away last year.  Pt lives her her sister. (5/27)     Social Determinants of Health     Financial Resource Strain: Patient Declined (6/13/2024)    Overall Financial Resource Strain (CARDIA)     Difficulty of Paying Living Expenses: Patient declined   Food Insecurity: Patient Declined (6/13/2024)    Hunger Vital Sign     Worried About Running Out of Food in the Last Year: Patient declined     Ran Out of Food in the Last Year: Patient declined   Transportation Needs: Patient Declined (6/13/2024)    TRANSPORTATION NEEDS     Transportation : Patient declined   Physical Activity: Patient Declined (6/13/2024)    Exercise Vital Sign     Days of Exercise per Week: Patient declined     Minutes of Exercise per Session:  "Patient declined   Stress: Patient Declined (2024)    [employer] [place] [employer] - Occupational Stress Questionnaire     Feeling of Stress : Patient declined   Housing Stability: Patient Declined (2024)    Housing Stability Vital Sign     Unable to Pay for Housing in the Last Year: Patient declined     Homeless in the Last Year: Patient declined       Review of Systems   Constitutional: Negative for chills and fever.   Cardiovascular:  Positive for leg swelling. Negative for claudication.   Respiratory:  Negative for cough and shortness of breath.    Skin:  Positive for dry skin and nail changes. Negative for itching and rash.   Musculoskeletal:  Positive for arthritis, back pain, joint pain, myalgias and stiffness. Negative for falls, joint swelling and muscle weakness.   Gastrointestinal:  Negative for diarrhea, nausea and vomiting.   Neurological:  Positive for paresthesias. Negative for numbness, tremors and weakness.   Psychiatric/Behavioral:  Negative for altered mental status and hallucinations.            Objective:      Vitals:    24 1401   BP: 114/69   Pulse: 73   Height: 5' 8" (1.727 m)   PainSc: 0-No pain   PainLoc: Foot       Physical Exam  Vitals and nursing note reviewed.   Constitutional:       General: She is not in acute distress.     Appearance: She is well-developed. She is not toxic-appearing or diaphoretic.      Comments: alert and oriented x 3.    Cardiovascular:      Pulses:           Dorsalis pedis pulses are 1+ on the right side and 1+ on the left side.      Comments: Posterior tibial pulses are diminished Bilaterally. Toes are cool to touch. Feet are warm proximally.There is decreased digital hair. Skin is atrophic, slightly hyperpigmented  Pulmonary:      Effort: No respiratory distress.   Musculoskeletal:         General: No deformity.      Right lower le+ Edema present.      Left lower le+ Edema present.      Right ankle: No tenderness. No lateral " malleolus, medial malleolus, AITF ligament, CF ligament or posterior TF ligament tenderness.      Right Achilles Tendon: No defects. Jamil's test negative.      Left ankle: No tenderness. No lateral malleolus, medial malleolus, AITF ligament, CF ligament or posterior TF ligament tenderness.      Left Achilles Tendon: No defects. Jamil's test negative.      Right foot: No tenderness or bony tenderness.      Left foot: No tenderness or bony tenderness.      Comments: Adequate joint range of motion without pain, limitation, nor crepitation Bilateral feet and ankle joints. Muscle strength is 5/5 in all groups bilaterally.           Feet:      Right foot:      Protective Sensation: 5 sites tested.  5 sites sensed.      Skin integrity: Skin integrity normal.      Left foot:      Protective Sensation: 5 sites tested.  5 sites sensed.      Skin integrity: Skin integrity normal.   Lymphadenopathy:      Comments: No lymphatic streaking     Skin:     General: Skin is warm and dry.      Coloration: Skin is not pale.      Findings: No rash.      Nails: There is no clubbing.      Comments: Toenails 1-5 bilaterally are elongated by 2-4 mm, thickened by 2-3 mm, discolored/yellowed, dystrophic, brittle with subungual debris. Diffuse xerosis noted to plantar aspects of b/l foot/heels.      Neurological:      Sensory: Sensory deficit present.      Motor: No atrophy.      Comments: Light touch, proprioception, and sharp/dull sensation are all intact bilaterally. Protective threshold with the [place]-Wienstein monofilament is intact bilaterally. Subjective paresthesias with no clearly identifiable source or trigger.      Psychiatric:         Attention and Perception: She is attentive.         Mood and Affect: Mood is not anxious. Affect is not inappropriate.         Speech: She is communicative. Speech is not slurred.         Behavior: Behavior is not combative.               Assessment:       Encounter Diagnoses   Name Primary?     Diabetic polyneuropathy associated with type 2 diabetes mellitus Yes    Onychomycosis due to dermatophyte            Plan:     I counseled the patient on her conditions, their implications and medical management.     - Shoe inspection. Diabetic Foot Education. Patient reminded of the importance of good nutrition and blood sugar control to help prevent podiatric complications of diabetes. Patient instructed on proper foot hygeine. We discussed wearing proper shoe gear, daily foot inspections, never walking without protective shoe gear, caution putting sharp instruments to feet     - Discussed DM foot care:  Wear comfortable, proper fitting shoes. Wash feet daily. Dry well. After drying, apply moisturizer to feet (no lotion to webspaces). Inspect feet daily for skin breaks, blisters, swelling, or redness. Wear cotton socks (preferably white)  Change socks every day. Do NOT walk barefoot. Do NOT use heating pads or warm/hot water soaks     With patient's permission, nails were aggressively reduced and debrided 1-5 b/l, removing all offending nail and debris. Patient tolerated this well and no blood was drawn. Patient reports relief following the procedure.     Long discussion with patient regarding appropriate, supportive and comfortable shoes. Recommended supportive style shoe brands with adequate arch supports to alleviate abnormal pressure and improve stability of foot while walking. Avoid flat shoes and barefoot walking as these will exacerbate or worsen symptoms.     RTC 3-4 months, sooner PRN

## 2024-08-13 NOTE — PROGRESS NOTES
VASCULAR SURGERY SERVICE    REFERRING DOCTOR: Lavinia Nieto NP     CHIEF COMPLAINT:  End-stage renal disease    HISTORY OF PRESENT ILLNESS: Cecile Bowen is a 74 y.o. female with prior simple mastectomy on the left, sentinel node biopsy, no axillary node dissection, with end-stage renal disease times 3 months via right IJ PermCath who is sent for permanent dialysis access.  She is currently dialyzing only Mondays and Fridays.    She is right-handed.  She has no history of AICD or pacemaker placement.  She does have known bilateral carpal tunnel syndrome with the occasional numbness in her hands but has not had a release.    She takes Eliquis 5 mg but only once a day because of easy bruising.  She is on clear why she is on Eliquis    Finally she takes chronic narcotics for back pain    S/p  1/ trans radial PTA, left AV fistula 04/29/2024  2. left brachiocephalic AV fistula creation 02/14/2024    3/5/2024:  This is initial follow-up after left brachiocephalic AV fistula creation 02/14/2024.  She is without complaint specifically no hand pain weakness or numbness    04/09/2024:  She now returns.  She is dialyzing on Mondays and Fridays only.  She denies any hand pain weakness or numbness    06/11/2024:  She now returns 6 weeks status post angioplasty left AV fistula.  She is still dialyzing only Mondays and Fridays via PermCath    08/13/2024:   She now returns.  She was using the fistula with a 1 and 1 with the PermCath but they have not able to accessed the fistula in the last 2 sessions.   Bilateral carpal tunnel syndrome since I was in high school but has never had surgery for this.  However in the left hand she says that her episodes of numbness have worsened in the last 2-3 weeks and occasionally has some pain.    She also notes a tiny sore at the base of her 4th digit at a nailbed    Past Medical History:   Diagnosis Date    Cervicogenic migraine     Chronic pain     CKD (chronic kidney disease) stage  4, GFR 15-29 ml/min     [facility]    CKD (chronic kidney disease) stage 4, GFR 15-29 ml/min     Diabetes mellitus     Long term use of Insulin, Diabetic Neuropathy    Dialysis patient 1/21/2022    Fibromyalgia     Hydronephrosis     Hyperlipidemia     Hypertension 12/12/2012    Hypothyroidism 12/12/2012    ARON (iron deficiency anemia)     Insomnia     Levoscoliosis     Malignant neoplasm of upper-outer quadrant of left breast in female, estrogen receptor positive     Metabolic acidosis     Mobility impaired     Nuclear sclerosis - Both Eyes 3/24/2014    VIJAYA (obstructive sleep apnea)     Osteopenia     Pulmonary nodule     Recurrent UTI     Renal manifestation of secondary diabetes mellitus     Secondary hyperparathyroidism, renal     Urinary retention        Past Surgical History:   Procedure Laterality Date    AV FISTULA PLACEMENT Left 2/14/2024    Procedure: CREATION, AV FISTULA;  Surgeon: RODRIGO Friedman III, MD;  Location: [facility] OR 2ND FLR;  Service: Vascular;  Laterality: Left;  LUE AVF creation vs AVG insertion    BIOPSY OF URETER Left 2/18/2022    Procedure: BIOPSY, URETER;  Surgeon: Ronel Whittington MD;  Location: [facility] OR 1ST FLR;  Service: Urology;  Laterality: Left;    BREAST BIOPSY Right     benign    CHOLECYSTECTOMY      COLONOSCOPY N/A 1/13/2017    Procedure: COLONOSCOPY;  Surgeon: Morris Wiseman MD;  Location: [facility] ENDO (4TH FLR);  Service: Endoscopy;  Laterality: N/A;  Renal pt Nephrology advised to avoid phosphate preps    COLONOSCOPY N/A 12/7/2023    Procedure: COLONOSCOPY;  Surgeon: Herve Allen MD;  Location: [facility] ENDO (2ND FLR);  Service: Endoscopy;  Laterality: N/A;  HD MWF; labs prior  suprapubic catheter  pt does not ambulate-uses christina lift- will have sling with her  9/7 ref. by Anastasiia Campbell, , PEG, instr. mailed, Eliquis hold approval pending-[facility] to hold Eliquis 2 days per Dr Navarro-GT  1/30-precall complete-MS    CYSTOSCOPY N/A 10/8/2018    Procedure:  CYSTOSCOPY;  Surgeon: Ronel Whittington MD;  Location: [facility] OR 1ST FLR;  Service: Urology;  Laterality: N/A;  45 min    CYSTOSCOPY N/A 3/25/2019    Procedure: CYSTOSCOPY;  Surgeon: Ronel Whittington MD;  Location: [facility] OR 1ST FLR;  Service: Urology;  Laterality: N/A;  45 min    CYSTOSCOPY N/A 8/26/2019    Procedure: CYSTOSCOPY;  Surgeon: Ronel Whittington MD;  [facility];  Service: Urology;  Laterality: N/A;  45 min    CYSTOSCOPY N/A 7/2/2021    Procedure: CYSTOSCOPY;  Surgeon: Ronel Whittington MD;  [facility];  Service: Urology;  Laterality: N/A;    CYSTOSCOPY  12/23/2021    Procedure: CYSTOSCOPY;  Surgeon: Ronel Whittington MD;  [facility];  Service: Urology;;    CYSTOSCOPY  2/18/2022    Procedure: CYSTOSCOPY;  Surgeon: Ronel Whittington MD;  [facility];  Service: Urology;;    CYSTOSCOPY W/ URETERAL STENT PLACEMENT Left 5/15/2021    Procedure: CYSTOSCOPY, WITH URETERAL STENT INSERTION;  Surgeon: Levy Sánchez Jr., MD;  [facility];  Service: Urology;  Laterality: Left;    CYSTOSCOPY WITH BIOPSY OF BLADDER N/A 1/27/2020    Procedure: CYSTOSCOPY, WITH BLADDER BIOPSY, WITH FULGURATION IF INDICATED;  Surgeon: Ronel Whittington MD;  Location: [facility] OR [address] [place] [address];  Service: Urology;  Laterality: N/A;    DILATION AND CURETTAGE OF UTERUS      FISTULOGRAM N/A 4/29/2024    Procedure: Fistulogram;  Surgeon: RODRIGO Friedman III, MD;  Location: [facility] CATH LAB;  Service: Peripheral Vascular;  Laterality: N/A;    GALLBLADDER SURGERY  2006    HYSTERECTOMY      INJECTION FOR SENTINEL NODE IDENTIFICATION Left 8/1/2019    Procedure: INJECTION, FOR SENTINEL NODE IDENTIFICATION;  Surgeon: Samia Fulton MD;  Location: [facility] OR 2ND FLR;  Service: General;  Laterality: Left;    INJECTION OF BOTULINUM TOXIN TYPE A N/A 10/8/2018    Procedure: INJECTION, BOTULINUM TOXIN, TYPE A 300 UNITS;  Surgeon: Ronel Whittington MD;   [address]  Service: Urology;  Laterality: N/A;    INJECTION OF BOTULINUM TOXIN TYPE A N/A 3/25/2019    Procedure: INJECTION, BOTULINUM TOXIN, TYPE A 300 UNITS;  Surgeon: Ronel Whittington MD;  [facility];  Service: Urology;  Laterality: N/A;    INJECTION OF BOTULINUM TOXIN TYPE A N/A 8/26/2019    Procedure: INJECTION, BOTULINUM TOXIN, TYPE A 300 UNITS;  Surgeon: Ronel Whittington MD;  Location: [facility] OR [address] [place] [address];  Service: Urology;  Laterality: N/A;    MASTECTOMY Left 8/1/2019    Procedure: MASTECTOMY 23 hour stay;  Surgeon: Samia Fulton MD;  Location: [facility] OR [address] [place] [address];  Service: General;  Laterality: Left;    MEDIPORT REMOVAL Right 6/24/2022    Procedure: REMOVAL, CATHETER, CENTRAL VENOUS, TUNNELED, WITH PORT;  Surgeon: Isauro Anderson MD;  Location: [facility] CATH LAB;  Service: Radiology;  Laterality: Right;    OVARIAN CYST SURGERY  1985    PERCUTANEOUS TRANSLUMINAL ANGIOPLASTY OF ARTERIOVENOUS FISTULA Left 4/29/2024    Procedure: PTA, AV FISTULA;  Surgeon: RODRIGO Friedman III, MD;  Location: [facility] CATH LAB;  Service: Peripheral Vascular;  Laterality: Left;    REPLACEMENT OF STENT Left 12/23/2021    Procedure: REPLACEMENT, STENT;  Surgeon: Ronel Whittington MD;  Location: [address];  Service: Urology;  Laterality: Left;    REPLACEMENT OF STENT Left 2/18/2022    Procedure: REPLACEMENT, STENT;  Surgeon: Ronel Whittington MD;  Location: [address];  Service: Urology;  Laterality: Left;    RETROGRADE PYELOGRAPHY Left 2/18/2022    Procedure: PYELOGRAM, RETROGRADE;  Surgeon: Ronel Whittington MD;  Location: [facility] OR [address] [place] [address];  Service: Urology;  Laterality: Left;    SENTINEL LYMPH NODE BIOPSY Left 8/1/2019    Procedure: BIOPSY, LYMPH NODE, SENTINEL;  Surgeon: Samia Fulton MD;  Location: [address];  Service: General;  Laterality: Left;    spt placement      TONSILLECTOMY, ADENOIDECTOMY      TOTAL ABDOMINAL HYSTERECTOMY W/ BILATERAL  "SALPINGOOPHORECTOMY  1985    URETEROSCOPY Left 2/18/2022    Procedure: URETEROSCOPY;  Surgeon: Ronel Whittington MD;  Location: [facility] OR [address] [place] [address];  Service: Urology;  Laterality: Left;         Current Outpatient Medications:     amLODIPine (NORVASC) 10 MG tablet, Take 10 mg by mouth., Disp: , Rfl:     anastrozole (ARIMIDEX) 1 mg Tab, TAKE 1 TABLET BY MOUTH EVERY DAY, Disp: 90 tablet, Rfl: 2    apixaban (ELIQUIS) 5 mg Tab, Take 1 tablet (5 mg total) by mouth Daily., Disp: , Rfl:     atorvastatin (LIPITOR) 80 MG tablet, Take 1 tablet (80 mg total) by mouth once daily., Disp: 90 tablet, Rfl: 3    BD INSULIN SYRINGE ULTRA-FINE 0.5 mL 31 gauge x 5/16" Syrg, USE WITH INSULIN 4 TIMES A DAY, Disp: 100 each, Rfl: 12    calcium acetate,phosphat bind, (PHOSLO) 667 mg capsule, Take 667 mg by mouth 3 (three) times daily with meals., Disp: , Rfl:     carvediloL (COREG) 12.5 MG tablet, Take 1 tablet (12.5 mg total) by mouth 2 (two) times daily., Disp: 180 tablet, Rfl: 3    DULoxetine (CYMBALTA) 20 MG capsule, Take 2 capsules (40 mg total) by mouth once daily., Disp: 180 capsule, Rfl: 1    erenumab-aooe (AIMOVIG AUTOINJECTOR) 140 mg/mL autoinjector, 140 mg MONTHLY (route: subcutaneous), Disp: 1 each, Rfl: 11    ergocalciferol (ERGOCALCIFEROL) 50,000 unit Cap, Take 1 capsule (50,000 Units total) by mouth every 7 days., Disp: 12 capsule, Rfl: 3    HYDROcodone-acetaminophen (NORCO)  mg per tablet, Take 1 tablet by mouth every 6 (six) hours as needed for Pain., Disp: 120 tablet, Rfl: 0    LANTUS SOLOSTAR U-100 INSULIN 100 unit/mL (3 mL) InPn pen, INJECT 12-15 UNITS UNDER THE SKIN at night, Disp: 15 mL, Rfl: 3    losartan (COZAAR) 100 MG tablet, Take 1 tablet (100 mg total) by mouth once daily., Disp: 90 tablet, Rfl: 3    methocarbamoL (ROBAXIN) 750 MG Tab, TAKE 1-2 TABLETS (750-1,500 MG TOTAL) BY MOUTH 3 (THREE) TIMES DAILY AS NEEDED (MUSCLE SPASMS)., Disp: 180 tablet, Rfl: 1    ondansetron (ZOFRAN-ODT) 8 MG TbDL, 8 mg EVERY " "12 HOURS (route: oral), Disp: , Rfl:     oxybutynin (DITROPAN-XL) 10 MG 24 hr tablet, TAKE 1 TABLET BY MOUTH EVERY DAY, Disp: 90 tablet, Rfl: 4    oxyCODONE (ROXICODONE) 5 MG immediate release tablet, Take 1 tablet (5 mg total) by mouth daily as needed (severe pain)., Disp: 30 tablet, Rfl: 0    pen needle, diabetic (BD ULTRA-FINE SHORT PEN NEEDLE) 31 gauge x 5/16" Ndle, USE WITH LANTUS DAILY, e 11.65, Disp: 100 each, Rfl: 3    sodium bicarbonate 650 MG tablet, Take 1 tablet (650 mg total) by mouth once daily., Disp: 30 tablet, Rfl: 11    SYNTHROID 50 mcg tablet, TAKE 1 TABLET BY MOUTH BEFORE BREAKFAST. ADMINISTER ON AN EMPTY STOMACH AT LEAST 30 MINUTES BEFORE FOOD. IF RECEIVING TUBE FEEDS, HOLD TUBE FEEDS FOR 1 HOUR BEFORE AND AFTER LEVOTHYROXINE ADMINISTRATION., Disp: 90 tablet, Rfl: 2    tenapanor 30 mg Tab, Take 30 mg by mouth 2 (two) times a day. Take 1 tablet in the morning with meal and 1 tablet in the afternoon with meal, Disp: , Rfl:     traZODone (DESYREL) 100 MG tablet, TAKE 1 TABLET BY MOUTH NIGHTLY AS NEEDED FOR INSOMNIA., Disp: 90 tablet, Rfl: 2    VELPHORO 500 mg Chew, CHEW INSERT TABLET 5 TIMES DAILY WITH MEAL AND SNACK, Disp: , Rfl:     furosemide (LASIX) 40 MG tablet, Take 1 tablet (40 mg total) by mouth once daily., Disp: 30 tablet, Rfl: 11    miconazole NITRATE 2 % (MICOTIN) 2 % top powder, Apply topically 2 (two) times daily., Disp: 85 g, Rfl: 0    sumatriptan (IMITREX) 100 MG tablet, Take 1 tablet (100 mg total) by mouth 2 (two) times daily as needed for Migraine (Do not take more than 2 in 24 hours or 3 in a week)., Disp: 30 tablet, Rfl: 1  No current facility-administered medications for this visit.    Facility-Administered Medications Ordered in Other Visits:     epoetin chloe injection 2,000 Units, 2,000 Units, Intravenous, 1 time in Clinic/HOD, Emmanuel Hare Jr., MD    heparin (porcine) injection 2,000 Units, 2,000 Units, Intravenous, Once, Emmanuel Hare Jr., MD    heparin " (porcine) injection 2,000 Units, 2,000 Units, Intravenous, Once, Emmanuel Hare Jr., MD    heparin (porcine) injection 4,000 Units, 4,000 Units, Intra-Catheter, 1 time in Clinic/HOD, Emmanuel Hare Jr., MD    iron sucrose injection 100 mg, 100 mg, Intravenous, 1 time in Clinic/HOD, Emmanuel Hare Jr., MD    Review of patient's allergies indicates:  No Known Allergies    Family History   Problem Relation Name Age of Onset    Diabetes Sister Kat     Kidney disease Sister Kat         CKD III    ALS Mother          d.    Cancer Maternal Grandmother          d. colon    Cancer Paternal Grandfather          d. lung    Scoliosis Brother [place]         increased pain    Prostate cancer Brother [place]         cured s/p surgery    Cancer Brother [place]         rare cancer that got into the bones    Diabetes Maternal Aunt      Kidney disease Maternal Aunt      Diabetes Maternal Uncle      Amblyopia Neg Hx      Blindness Neg Hx      Cataracts Neg Hx      Glaucoma Neg Hx      Macular degeneration Neg Hx      Retinal detachment Neg Hx      Strabismus Neg Hx         Social History     Tobacco Use    Smoking status: Never    Smokeless tobacco: Never   Substance Use Topics    Alcohol use: No     Alcohol/week: 0.0 standard drinks of alcohol    Drug use: No     PHYSICAL EXAM:   BP (!) 143/56 (BP Location: Right arm, Patient Position: Sitting, BP Method: Medium (Automatic))   Pulse (!) 56   Temp 98.2 °F (36.8 °C) (Oral)   Constitutional:  Alert,   Well-appearing  In no distress.  Traveling by wheelchair   Neurological: Normal speech  no focal findings  CN II - XII grossly intact.    Psychiatric: Mood and affect appropriate and symmetric.   HEENT: Normocephalic / atraumatic  PERRLA  Midline trachea  No scars across the neck   Cardiac: Regular rate and rhythm.   Pulmonary: Normal pulmonary effort.   Abdomen: Soft, not distended.     Skin: Warm and well perfused.    Vascular:  Radial pulses not readily palpable perhaps  trace 1+ today, augments to a easily palpable pulse with fistula compression   Extremities/  Musculoskeletal: Left arm:  Left brachiocephalic AV fistula has significant excellent thrill with no significant pulsatility.  This is stable.  However she does have fullness consistent with a hematoma in the mid upper arm    Hand  is 5/5 fingers are pink.  See picture for very tiny sore on the 4th left finger   08/13/2024      IMAGING:  June 20, 2024 Duplex of the fistula shows no significant stenosis flow volume increased to 1.28 L  Diameter 9 point 2 proximal, 9.3 minute, 6.7 distal  Depth 6.3 proximal, 10.2 mid      IMPRESSION:    Six months status post left AV fistula.  Use the fistula with both needles as above and has a small hematoma  Vascular steal, which potentially may have some symptomatology.  Difficult to assess she has chronic carpal tunnel syndrome this site as well  3.  Narcotic dependence for chronic back pain.       PLAN:   Continue dialyzing with a PermCath for now to let the hematoma resolve in her arm  In 2 weeks with a quantitative duplex of the fistula as well as finger PPG is with and without fistula compression    WALE Friedman III, MD, FACS  Professor and Chief, Vascular and Endovascular Surgery

## 2024-08-19 RX ORDER — DULOXETIN HYDROCHLORIDE 20 MG/1
40 CAPSULE, DELAYED RELEASE ORAL
Qty: 180 CAPSULE | Refills: 1 | OUTPATIENT
Start: 2024-08-19

## 2024-08-19 RX ORDER — DULOXETIN HYDROCHLORIDE 20 MG/1
40 CAPSULE, DELAYED RELEASE ORAL DAILY
Qty: 180 CAPSULE | Refills: 1 | Status: SHIPPED | OUTPATIENT
Start: 2024-08-19

## 2024-08-19 NOTE — TELEPHONE ENCOUNTER
Pt last seen 11/14/23     DULoxetine (CYMBALTA) 20 MG capsule     Sig: TAKE 2 CAPSULES(40 MG) BY MOUTH EVERY DAY    Disp: 180 capsule    Refills: 1 (Pharmacy requested: Not specified)    Start: 8/17/2024    Class: Normal    Last ordered: 6 months ago (2/12/2024) by Tesha Stafford MD    Last refill: 5/16/2024    Rx #: [id number]    SNRI Protocol [id number]/2024 07:42 PM   Protocol Details Serum Creatinine less than 1.4 on file in the past 12 months    Patient is not currently pregnant    No positive pregnancy test in past 12 months    Visit with authorizing provider or PCP in past 12 months or upcoming 90 days    Blood Pressure below 139/89 on file in past 12 months      To be filled at: [id number] - [place] - [id number] AT [facility] [place] [address] [place]

## 2024-08-20 ENCOUNTER — PATIENT MESSAGE (OUTPATIENT)
Dept: PHYSICAL MEDICINE AND REHAB | Facility: CLINIC | Age: 74
End: 2024-08-20
Payer: MEDICARE

## 2024-08-20 DIAGNOSIS — G89.29 CHRONIC MIDLINE LOW BACK PAIN WITHOUT SCIATICA: ICD-10-CM

## 2024-08-20 DIAGNOSIS — G89.29 CHRONIC NECK PAIN: ICD-10-CM

## 2024-08-20 DIAGNOSIS — M54.2 CHRONIC NECK PAIN: ICD-10-CM

## 2024-08-20 DIAGNOSIS — M79.7 FIBROMYALGIA: ICD-10-CM

## 2024-08-20 DIAGNOSIS — M54.50 CHRONIC MIDLINE LOW BACK PAIN WITHOUT SCIATICA: ICD-10-CM

## 2024-08-21 ENCOUNTER — PATIENT MESSAGE (OUTPATIENT)
Dept: NEUROLOGY | Facility: CLINIC | Age: 74
End: 2024-08-21
Payer: MEDICARE

## 2024-08-21 RX ORDER — SUMATRIPTAN SUCCINATE 100 MG/1
TABLET ORAL
Qty: 30 TABLET | Refills: 1 | Status: SHIPPED | OUTPATIENT
Start: 2024-08-21 | End: 2024-08-22 | Stop reason: SDUPTHER

## 2024-08-21 RX ORDER — HYDROCODONE BITARTRATE AND ACETAMINOPHEN 10; 325 MG/1; MG/1
1 TABLET ORAL EVERY 6 HOURS PRN
Qty: 120 TABLET | Refills: 0 | Status: SHIPPED | OUTPATIENT
Start: 2024-08-21 | End: 2024-09-20

## 2024-08-21 RX ORDER — METHOCARBAMOL 750 MG/1
TABLET, FILM COATED ORAL
Qty: 180 TABLET | Refills: 1 | Status: SHIPPED | OUTPATIENT
Start: 2024-08-21

## 2024-08-22 RX ORDER — SUMATRIPTAN SUCCINATE 100 MG/1
100 TABLET ORAL
Qty: 30 TABLET | Refills: 1 | Status: SHIPPED | OUTPATIENT
Start: 2024-08-22

## 2024-09-03 ENCOUNTER — OFFICE VISIT (OUTPATIENT)
Dept: VASCULAR SURGERY | Facility: CLINIC | Age: 74
End: 2024-09-03
Attending: SURGERY
Payer: MEDICARE

## 2024-09-03 ENCOUNTER — HOSPITAL ENCOUNTER (OUTPATIENT)
Dept: VASCULAR SURGERY | Facility: CLINIC | Age: 74
Discharge: HOME | End: 2024-09-03
Attending: SURGERY
Payer: MEDICARE

## 2024-09-03 VITALS — DIASTOLIC BLOOD PRESSURE: 58 MMHG | HEART RATE: 78 BPM | TEMPERATURE: 99 F | SYSTOLIC BLOOD PRESSURE: 131 MMHG

## 2024-09-03 DIAGNOSIS — Z99.2 ESRD (END STAGE RENAL DISEASE) ON DIALYSIS: Primary | ICD-10-CM

## 2024-09-03 DIAGNOSIS — Z01.818 PRE-OP EVALUATION: Primary | ICD-10-CM

## 2024-09-03 DIAGNOSIS — N18.6 ESRD (END STAGE RENAL DISEASE) ON DIALYSIS: ICD-10-CM

## 2024-09-03 DIAGNOSIS — Z99.2 ESRD (END STAGE RENAL DISEASE) ON DIALYSIS: ICD-10-CM

## 2024-09-03 DIAGNOSIS — N18.6 ESRD (END STAGE RENAL DISEASE) ON DIALYSIS: Primary | ICD-10-CM

## 2024-09-03 DIAGNOSIS — T82.898A VASCULAR STEAL SYNDROME AFTER PROCEDURE: ICD-10-CM

## 2024-09-03 DIAGNOSIS — R20.0 NUMBNESS OF FINGER: Primary | ICD-10-CM

## 2024-09-03 PROCEDURE — 99999 PR PBB SHADOW E&M-EST. PATIENT-LVL IV: CPT | Mod: PBBFAC,HCNC,, | Performed by: SURGERY

## 2024-09-03 NOTE — H&P (VIEW-ONLY)
VASCULAR SURGERY SERVICE    REFERRING DOCTOR: Lavinia Nieto NP     CHIEF COMPLAINT:  End-stage renal disease    HISTORY OF PRESENT ILLNESS: Cecile Bowen is a 74 y.o. female with prior simple mastectomy on the left, sentinel node biopsy, no axillary node dissection, with end-stage renal disease times 3 months via right IJ PermCath who is sent for permanent dialysis access.  She is currently dialyzing only Mondays and Fridays.    She is right-handed.  She has no history of AICD or pacemaker placement.  She does have known bilateral carpal tunnel syndrome with the occasional numbness in her hands but has not had a release.    She takes Eliquis 5 mg but only once a day because of easy bruising.  She is on clear why she is on Eliquis    Finally she takes chronic narcotics for back pain    S/p  1/ trans radial PTA, left AV fistula 04/29/2024  2. left brachiocephalic AV fistula creation 02/14/2024    3/5/2024:  This is initial follow-up after left brachiocephalic AV fistula creation 02/14/2024.  She is without complaint specifically no hand pain weakness or numbness    04/09/2024:  She now returns.  She is dialyzing on Mondays and Fridays only.  She denies any hand pain weakness or numbness    06/11/2024:  She now returns 6 weeks status post angioplasty left AV fistula.  She is still dialyzing only Mondays and Fridays via PermCath    08/13/2024:   She now returns.  She was using the fistula with a 1 and 1 with the PermCath but they have not able to accessed the fistula in the last 2 sessions.   Bilateral carpal tunnel syndrome since I was in high school but has never had surgery for this.  However in the left hand she says that her episodes of numbness have worsened in the last 2-3 weeks and occasionally has some pain.    She also notes a tiny sore at the base of her 4th digit at a nailbed    09/03/2024:  She now returns in continues use her PermCath.   Notably she has been nonambulatory for better part of a  year.   Regarding her left hand she does report increased numbness for the last 6 months corresponding to after her fistula was placed.  Denies any weakness, rarely says she has some pain.  Notably she has had carpal tunnel syndrome since I was in high school we and wears a chronic hand brace.    She had tiny ulceration of the base of her left 4th digit when I saw her 2 weeks ago.  She reports this is completely healed but she has developed a new similar lesion on the left 3rd finger which he ascribes to a ingrown nail    She is on chronic Eliquis but was not sure why she was on this.  Looking back in her chart it appears that she was diagnosed with atrial fibrillation 2021 but this is not on her problem list.    Past Medical History:   Diagnosis Date    Cervicogenic migraine     Chronic pain     CKD (chronic kidney disease) stage 4, GFR 15-29 ml/min     [facility]    CKD (chronic kidney disease) stage 4, GFR 15-29 ml/min     Diabetes mellitus     Long term use of Insulin, Diabetic Neuropathy    Dialysis patient 1/21/2022    Fibromyalgia     Hydronephrosis     Hyperlipidemia     Hypertension 12/12/2012    Hypothyroidism 12/12/2012    ARON (iron deficiency anemia)     Insomnia     Levoscoliosis     Malignant neoplasm of upper-outer quadrant of left breast in female, estrogen receptor positive     Metabolic acidosis     Mobility impaired     Nuclear sclerosis - Both Eyes 3/24/2014    VIJAYA (obstructive sleep apnea)     Osteopenia     Pulmonary nodule     Recurrent UTI     Renal manifestation of secondary diabetes mellitus     Secondary hyperparathyroidism, renal     Urinary retention        Past Surgical History:   Procedure Laterality Date    AV FISTULA PLACEMENT Left 2/14/2024    Procedure: CREATION, AV FISTULA;  Surgeon: RODRIGO Friedman III, MD;  Location: [facility] OR [address] [place] [address];  Service: Vascular;  Laterality: Left;  LUE AVF creation vs AVG insertion    BIOPSY OF URETER Left 2/18/2022    Procedure: BIOPSY, URETER;   Surgeon: Ronel Whittington MD;  Location: [facility] OR 1ST FLR;  Service: Urology;  Laterality: Left;    BREAST BIOPSY Right     benign    CHOLECYSTECTOMY      COLONOSCOPY N/A 1/13/2017    Procedure: COLONOSCOPY;  Surgeon: Morris Wiseman MD;  Location: [facility] ENDO (4TH FLR);  Service: Endoscopy;  Laterality: N/A;  Renal pt Nephrology advised to avoid phosphate preps    COLONOSCOPY N/A 12/7/2023    Procedure: COLONOSCOPY;  Surgeon: Herve Allen MD;  Location: [facility] ENDO (2ND FLR);  Service: Endoscopy;  Laterality: N/A;  HD MWF; labs prior  suprapubic catheter  pt does not ambulate-uses christina lift- will have sling with her  9/7 ref. by Anastasiia Campbell DO, RIKKI, instr. mailed, Eliquis hold approval pending-[facility] to hold Eliquis 2 days per Dr Navarro-GT  1/30-precall complete-MS    CYSTOSCOPY N/A 10/8/2018    Procedure: CYSTOSCOPY;  Surgeon: Ronel Whittington MD;  [facility];  Service: Urology;  Laterality: N/A;  45 min    CYSTOSCOPY N/A 3/25/2019    Procedure: CYSTOSCOPY;  Surgeon: Ronel Whittington MD;  Location: [facility];  Service: Urology;  Laterality: N/A;  45 min    CYSTOSCOPY N/A 8/26/2019    Procedure: CYSTOSCOPY;  Surgeon: Ronel Whittington MD;  Location: [facility];  Service: Urology;  Laterality: N/A;  45 min    CYSTOSCOPY N/A 7/2/2021    Procedure: CYSTOSCOPY;  Surgeon: Ronel Whittington MD;  Location: [facility] OR [facility];  Service: Urology;  Laterality: N/A;    CYSTOSCOPY  12/23/2021    Procedure: CYSTOSCOPY;  Surgeon: Ronel Whittington MD;  Location: [facility] OR [facility];  Service: Urology;;    CYSTOSCOPY  2/18/2022    Procedure: CYSTOSCOPY;  Surgeon: Ronel Whittington MD;  Location: [facility] OR [facility];  Service: Urology;;    CYSTOSCOPY W/ URETERAL STENT PLACEMENT Left 5/15/2021    Procedure: CYSTOSCOPY, WITH URETERAL STENT INSERTION;  Surgeon: Levy Sánchez Jr., MD;  Location: [facility] OR [address] [place] [address];  Service: Urology;  Laterality: Left;    CYSTOSCOPY  WITH BIOPSY OF BLADDER N/A 1/27/2020    Procedure: CYSTOSCOPY, WITH BLADDER BIOPSY, WITH FULGURATION IF INDICATED;  Surgeon: Ronel Whittington MD;  Location: [facility] OR [address] [place] [address];  Service: Urology;  Laterality: N/A;    DILATION AND CURETTAGE OF UTERUS      FISTULOGRAM N/A 4/29/2024    Procedure: Fistulogram;  Surgeon: RODRIGO Friedman III, MD;  Location: [facility] CATH LAB;  Service: Peripheral Vascular;  Laterality: N/A;    GALLBLADDER SURGERY  2006    HYSTERECTOMY      INJECTION FOR SENTINEL NODE IDENTIFICATION Left 8/1/2019    Procedure: INJECTION, FOR SENTINEL NODE IDENTIFICATION;  Surgeon: Samia Fulton MD;  Location: [facility] OR [address] [place] [address];  Service: General;  Laterality: Left;    INJECTION OF BOTULINUM TOXIN TYPE A N/A 10/8/2018    Procedure: INJECTION, BOTULINUM TOXIN, TYPE A 300 UNITS;  Surgeon: Ronel Whittington MD;  Location: [facility] OR [address] [place] [address];  Service: Urology;  Laterality: N/A;    INJECTION OF BOTULINUM TOXIN TYPE A N/A 3/25/2019    Procedure: INJECTION, BOTULINUM TOXIN, TYPE A 300 UNITS;  Surgeon: Ronel Whittington MD;  Location: [facility] OR [address] [place] [address];  Service: Urology;  Laterality: N/A;    INJECTION OF BOTULINUM TOXIN TYPE A N/A 8/26/2019    Procedure: INJECTION, BOTULINUM TOXIN, TYPE A 300 UNITS;  Surgeon: Ronel Whittington MD;  Location: [facility] OR [address] [place] [address];  Service: Urology;  Laterality: N/A;    MASTECTOMY Left 8/1/2019    Procedure: MASTECTOMY 23 hour stay;  Surgeon: Samia Fulton MD;  Location: [facility] OR [address] [place] [address];  Service: General;  Laterality: Left;    MEDIPORT REMOVAL Right 6/24/2022    Procedure: REMOVAL, CATHETER, CENTRAL VENOUS, TUNNELED, WITH PORT;  Surgeon: Isauro Anderson MD;  Location: [facility] CATH LAB;  Service: Radiology;  Laterality: Right;    OVARIAN CYST SURGERY  1985    PERCUTANEOUS TRANSLUMINAL ANGIOPLASTY OF ARTERIOVENOUS FISTULA Left 4/29/2024    Procedure: PTA, AV FISTULA;  Surgeon: RODRIGO Friedman III, MD;  Location: [facility] CATH LAB;  Service: Peripheral Vascular;  Laterality:  "Left;    REPLACEMENT OF STENT Left 12/23/2021    Procedure: REPLACEMENT, STENT;  Surgeon: Ronel Whittington MD;  Location: [facility] OR 1ST FLR;  Service: Urology;  Laterality: Left;    REPLACEMENT OF STENT Left 2/18/2022    Procedure: REPLACEMENT, STENT;  Surgeon: Ronel Whittington MD;  Location: [facility] OR 1ST FLR;  Service: Urology;  Laterality: Left;    RETROGRADE PYELOGRAPHY Left 2/18/2022    Procedure: PYELOGRAM, RETROGRADE;  Surgeon: Ronel Whittington MD;  Location: [facility] OR 1ST FLR;  Service: Urology;  Laterality: Left;    SENTINEL LYMPH NODE BIOPSY Left 8/1/2019    Procedure: BIOPSY, LYMPH NODE, SENTINEL;  Surgeon: Samia Fulton MD;  Location: [facility] OR 2ND FLR;  Service: General;  Laterality: Left;    spt placement      TONSILLECTOMY, ADENOIDECTOMY      TOTAL ABDOMINAL HYSTERECTOMY W/ BILATERAL SALPINGOOPHORECTOMY  1985    URETEROSCOPY Left 2/18/2022    Procedure: URETEROSCOPY;  Surgeon: Ronel Whittington MD;  Location: [facility] OR 1ST FLR;  Service: Urology;  Laterality: Left;         Current Outpatient Medications:     amLODIPine (NORVASC) 10 MG tablet, Take 10 mg by mouth., Disp: , Rfl:     anastrozole (ARIMIDEX) 1 mg Tab, TAKE 1 TABLET BY MOUTH EVERY DAY, Disp: 90 tablet, Rfl: 2    apixaban (ELIQUIS) 5 mg Tab, Take 1 tablet (5 mg total) by mouth Daily., Disp: , Rfl:     atorvastatin (LIPITOR) 80 MG tablet, Take 1 tablet (80 mg total) by mouth once daily., Disp: 90 tablet, Rfl: 3    BD INSULIN SYRINGE ULTRA-FINE 0.5 mL 31 gauge x 5/16" Syrg, USE WITH INSULIN 4 TIMES A DAY, Disp: 100 each, Rfl: 12    calcium acetate,phosphat bind, (PHOSLO) 667 mg capsule, Take 667 mg by mouth 3 (three) times daily with meals., Disp: , Rfl:     carvediloL (COREG) 12.5 MG tablet, Take 1 tablet (12.5 mg total) by mouth 2 (two) times daily., Disp: 180 tablet, Rfl: 3    DULoxetine (CYMBALTA) 20 MG capsule, Take 2 capsules (40 mg total) by mouth once daily., Disp: 180 capsule, Rfl: 1    erenumab-aooe (AIMOVIG " "AUTOINJECTOR) 140 mg/mL autoinjector, 140 mg MONTHLY (route: subcutaneous), Disp: 1 each, Rfl: 11    ergocalciferol (ERGOCALCIFEROL) 50,000 unit Cap, Take 1 capsule (50,000 Units total) by mouth every 7 days., Disp: 12 capsule, Rfl: 3    HYDROcodone-acetaminophen (NORCO)  mg per tablet, Take 1 tablet by mouth every 6 (six) hours as needed for Pain., Disp: 120 tablet, Rfl: 0    LANTUS SOLOSTAR U-100 INSULIN 100 unit/mL (3 mL) InPn pen, INJECT 12-15 UNITS UNDER THE SKIN at night, Disp: 15 mL, Rfl: 3    losartan (COZAAR) 100 MG tablet, Take 1 tablet (100 mg total) by mouth once daily., Disp: 90 tablet, Rfl: 3    methocarbamoL (ROBAXIN) 750 MG Tab, TAKE 1-2 TABLETS (750-1,500 MG TOTAL) BY MOUTH 3 (THREE) TIMES DAILY AS NEEDED (MUSCLE SPASMS)., Disp: 180 tablet, Rfl: 1    ondansetron (ZOFRAN-ODT) 8 MG TbDL, 8 mg EVERY 12 HOURS (route: oral), Disp: , Rfl:     oxybutynin (DITROPAN-XL) 10 MG 24 hr tablet, TAKE 1 TABLET BY MOUTH EVERY DAY, Disp: 90 tablet, Rfl: 4    pen needle, diabetic (BD ULTRA-FINE SHORT PEN NEEDLE) 31 gauge x 5/16" Ndle, USE WITH LANTUS DAILY, e 11.65, Disp: 100 each, Rfl: 3    sodium bicarbonate 650 MG tablet, Take 1 tablet (650 mg total) by mouth once daily., Disp: 30 tablet, Rfl: 11    sumatriptan (IMITREX) 100 MG tablet, Take 1 tablet (100 mg total) by mouth every 2 (two) hours as needed for Migraine (Limit 2 in 14 hours and 9 in one month)., Disp: 30 tablet, Rfl: 1    SYNTHROID 50 mcg tablet, TAKE 1 TABLET BY MOUTH BEFORE BREAKFAST. ADMINISTER ON AN EMPTY STOMACH AT LEAST 30 MINUTES BEFORE FOOD. IF RECEIVING TUBE FEEDS, HOLD TUBE FEEDS FOR 1 HOUR BEFORE AND AFTER LEVOTHYROXINE ADMINISTRATION., Disp: 90 tablet, Rfl: 2    tenapanor 30 mg Tab, Take 30 mg by mouth 2 (two) times a day. Take 1 tablet in the morning with meal and 1 tablet in the afternoon with meal, Disp: , Rfl:     traZODone (DESYREL) 100 MG tablet, TAKE 1 TABLET BY MOUTH NIGHTLY AS NEEDED FOR INSOMNIA., Disp: 90 tablet, Rfl: 2    " VELPHORO 500 mg Chew, CHEW INSERT TABLET 5 TIMES DAILY WITH MEAL AND SNACK, Disp: , Rfl:     furosemide (LASIX) 40 MG tablet, Take 1 tablet (40 mg total) by mouth once daily., Disp: 30 tablet, Rfl: 11    miconazole NITRATE 2 % (MICOTIN) 2 % top powder, Apply topically 2 (two) times daily., Disp: 85 g, Rfl: 0    oxyCODONE (ROXICODONE) 5 MG immediate release tablet, Take 1 tablet (5 mg total) by mouth daily as needed (severe pain)., Disp: 30 tablet, Rfl: 0  No current facility-administered medications for this visit.    Facility-Administered Medications Ordered in Other Visits:     epoetin chloe injection 2,000 Units, 2,000 Units, Intravenous, 1 time in Clinic/HOD, Emmanuel Hare Jr., MD    heparin (porcine) injection 2,000 Units, 2,000 Units, Intravenous, Once, Emmanuel Hare Jr., MD    heparin (porcine) injection 2,000 Units, 2,000 Units, Intravenous, Once, Emmanuel Hare Jr., MD    heparin (porcine) injection 4,000 Units, 4,000 Units, Intra-Catheter, 1 time in Clinic/HOD, Emmanuel Hare Jr., MD    iron sucrose injection 100 mg, 100 mg, Intravenous, 1 time in Clinic/HOD, Emmanuel Hare Jr., MD    Review of patient's allergies indicates:  No Known Allergies    Family History   Problem Relation Name Age of Onset    Diabetes Sister Kat     Kidney disease Sister Kat         CKD III    ALS Mother          d.    Cancer Maternal Grandmother          d. colon    Cancer Paternal Grandfather          d. lung    Scoliosis Brother [place]         increased pain    Prostate cancer Brother [place]         cured s/p surgery    Cancer Brother [place]         rare cancer that got into the bones    Diabetes Maternal Aunt      Kidney disease Maternal Aunt      Diabetes Maternal Uncle      Amblyopia Neg Hx      Blindness Neg Hx      Cataracts Neg Hx      Glaucoma Neg Hx      Macular degeneration Neg Hx      Retinal detachment Neg Hx      Strabismus Neg Hx         Social History     Tobacco Use    Smoking  status: Never    Smokeless tobacco: Never   Substance Use Topics    Alcohol use: No     Alcohol/week: 0.0 standard drinks of alcohol    Drug use: No     PHYSICAL EXAM:   BP (!) 131/58 (BP Location: Right arm, Patient Position: Sitting, BP Method: Medium (Automatic))   Pulse 78   Temp 98.6 °F (37 °C) (Oral)   Constitutional:  Alert,   Well-appearing  In no distress.  Traveling by wheelchair   Neurological: Normal speech  no focal findings  CN II - XII grossly intact.    Psychiatric: Mood and affect appropriate and symmetric.   HEENT: Normocephalic / atraumatic  PERRLA  Midline trachea  No scars across the neck   Cardiac: Regular rate and rhythm.   Pulmonary: Normal pulmonary effort.   Abdomen: Soft, not distended.     Skin: Warm and well perfused.    Vascular:  Radial pulse on the left today is 1+ palpable, although it clearly stronger with fistula compression   Extremities/  Musculoskeletal: Left arm:  Left brachiocephalic AV fistula has significant excellent thrill with no significant pulsatility.  This is stable.  Prior hematoma has resolved    Left hand 5/5  strength.  The prior tiny 4th digit sore is completely healed, but now has similarly tiny issue at the base of the medial nail had on the 3rd digit   08/13/2024      IMAGING:  June 20, 2024 Duplex of the fistula shows no significant stenosis flow volume increased to 1.28 L  Diameter 9 point 2 proximal, 9.3 minute, 6.7 distal  Depth 6.3 proximal, 10.2 mid      IMPRESSION:   Resolution of hematoma, left AV fistula.  Clinically it is very well matured  Vascular steal, which potentially may have some symptomatology.    3.  Narcotic dependence for chronic back pain.  4.  Nonambulatory status x1 year  5.  On chronic Eliquis, evidently for atrial fibrillation diagnosed in 2021.  By clinical exam she does not feel that she is in AFib.  She was unaware of why she is on the Eliquis       PLAN:   Follow up with the primary care physician, to assess whether she  needs to be on chronic anticoagulation with Eliquis.  May begin cannulation of her left AV fistula tomorrow 09/04/2024  To further evaluate for left arm steal, right common femoral approach arch and left arm angiogram possible intervention 09/19/2024  Hold Eliquis 2 days prior    Notably, the patient may need a Ray lift in transfers for her angiogram    I have explained the risks, benefits and alternatives for this procedure in detail.  The patient voices understanding and all questions have be answered, and agrees to proceed with the procedure.     WALE Friedman III, MD, FACS  Professor and Chief, Vascular and Endovascular Surgery

## 2024-09-03 NOTE — PROGRESS NOTES
VASCULAR SURGERY SERVICE    REFERRING DOCTOR: Lavinia Nieto NP     CHIEF COMPLAINT:  End-stage renal disease    HISTORY OF PRESENT ILLNESS: Cecile Bowen is a 74 y.o. female with prior simple mastectomy on the left, sentinel node biopsy, no axillary node dissection, with end-stage renal disease times 3 months via right IJ PermCath who is sent for permanent dialysis access.  She is currently dialyzing only Mondays and Fridays.    She is right-handed.  She has no history of AICD or pacemaker placement.  She does have known bilateral carpal tunnel syndrome with the occasional numbness in her hands but has not had a release.    She takes Eliquis 5 mg but only once a day because of easy bruising.  She is on clear why she is on Eliquis    Finally she takes chronic narcotics for back pain    S/p  1/ trans radial PTA, left AV fistula 04/29/2024  2. left brachiocephalic AV fistula creation 02/14/2024    3/5/2024:  This is initial follow-up after left brachiocephalic AV fistula creation 02/14/2024.  She is without complaint specifically no hand pain weakness or numbness    04/09/2024:  She now returns.  She is dialyzing on Mondays and Fridays only.  She denies any hand pain weakness or numbness    06/11/2024:  She now returns 6 weeks status post angioplasty left AV fistula.  She is still dialyzing only Mondays and Fridays via PermCath    08/13/2024:   She now returns.  She was using the fistula with a 1 and 1 with the PermCath but they have not able to accessed the fistula in the last 2 sessions.   Bilateral carpal tunnel syndrome since I was in high school but has never had surgery for this.  However in the left hand she says that her episodes of numbness have worsened in the last 2-3 weeks and occasionally has some pain.    She also notes a tiny sore at the base of her 4th digit at a nailbed    09/03/2024:  She now returns in continues use her PermCath.   Notably she has been nonambulatory for better part of a  year.   Regarding her left hand she does report increased numbness for the last 6 months corresponding to after her fistula was placed.  Denies any weakness, rarely says she has some pain.  Notably she has had carpal tunnel syndrome since I was in high school we and wears a chronic hand brace.    She had tiny ulceration of the base of her left 4th digit when I saw her 2 weeks ago.  She reports this is completely healed but she has developed a new similar lesion on the left 3rd finger which he ascribes to a ingrown nail    She is on chronic Eliquis but was not sure why she was on this.  Looking back in her chart it appears that she was diagnosed with atrial fibrillation 2021 but this is not on her problem list.    Past Medical History:   Diagnosis Date    Cervicogenic migraine     Chronic pain     CKD (chronic kidney disease) stage 4, GFR 15-29 ml/min     [facility]    CKD (chronic kidney disease) stage 4, GFR 15-29 ml/min     Diabetes mellitus     Long term use of Insulin, Diabetic Neuropathy    Dialysis patient 1/21/2022    Fibromyalgia     Hydronephrosis     Hyperlipidemia     Hypertension 12/12/2012    Hypothyroidism 12/12/2012    ARON (iron deficiency anemia)     Insomnia     Levoscoliosis     Malignant neoplasm of upper-outer quadrant of left breast in female, estrogen receptor positive     Metabolic acidosis     Mobility impaired     Nuclear sclerosis - Both Eyes 3/24/2014    VIJAYA (obstructive sleep apnea)     Osteopenia     Pulmonary nodule     Recurrent UTI     Renal manifestation of secondary diabetes mellitus     Secondary hyperparathyroidism, renal     Urinary retention        Past Surgical History:   Procedure Laterality Date    AV FISTULA PLACEMENT Left 2/14/2024    Procedure: CREATION, AV FISTULA;  Surgeon: RODRIGO Friedman III, MD;  Location: [facility] OR [address] [place] [address];  Service: Vascular;  Laterality: Left;  LUE AVF creation vs AVG insertion    BIOPSY OF URETER Left 2/18/2022    Procedure: BIOPSY, URETER;   Surgeon: Ronel Whittington MD;  Location: [facility] OR 1ST FLR;  Service: Urology;  Laterality: Left;    BREAST BIOPSY Right     benign    CHOLECYSTECTOMY      COLONOSCOPY N/A 1/13/2017    Procedure: COLONOSCOPY;  Surgeon: Morris Wiseman MD;  Location: [facility] ENDO (4TH FLR);  Service: Endoscopy;  Laterality: N/A;  Renal pt Nephrology advised to avoid phosphate preps    COLONOSCOPY N/A 12/7/2023    Procedure: COLONOSCOPY;  Surgeon: Herve Allen MD;  Location: [facility] ENDO (2ND FLR);  Service: Endoscopy;  Laterality: N/A;  HD MWF; labs prior  suprapubic catheter  pt does not ambulate-uses christina lift- will have sling with her  9/7 ref. by Anastasiia Campbell DO, RIKKI, instr. mailed, Eliquis hold approval pending-[facility] to hold Eliquis 2 days per Dr Navarro-GT  1/30-precall complete-MS    CYSTOSCOPY N/A 10/8/2018    Procedure: CYSTOSCOPY;  Surgeon: Ronel Whittington MD;  [facility];  Service: Urology;  Laterality: N/A;  45 min    CYSTOSCOPY N/A 3/25/2019    Procedure: CYSTOSCOPY;  Surgeon: Ronel Whittington MD;  [facility];  Service: Urology;  Laterality: N/A;  45 min    CYSTOSCOPY N/A 8/26/2019    Procedure: CYSTOSCOPY;  Surgeon: Ronel Whittington MD;  [facility];  Service: Urology;  Laterality: N/A;  45 min    CYSTOSCOPY N/A 7/2/2021    Procedure: CYSTOSCOPY;  Surgeon: Ronel Whittington MD;  [facility];  Service: Urology;  Laterality: N/A;    CYSTOSCOPY  12/23/2021    Procedure: CYSTOSCOPY;  Surgeon: Ronel Whittington MD;  [facility];  Service: Urology;;    CYSTOSCOPY  2/18/2022    Procedure: CYSTOSCOPY;  Surgeon: Ronel Whittington MD;  [facility];  Service: Urology;;    CYSTOSCOPY W/ URETERAL STENT PLACEMENT Left 5/15/2021    Procedure: CYSTOSCOPY, WITH URETERAL STENT INSERTION;  Surgeon: Levy Sánchez Jr., MD;  Location: [facility] OR [address] [place] [address];  Service: Urology;  Laterality: Left;    CYSTOSCOPY  WITH BIOPSY OF BLADDER N/A 1/27/2020    Procedure: CYSTOSCOPY, WITH BLADDER BIOPSY, WITH FULGURATION IF INDICATED;  Surgeon: Ronel Whittington MD;  Location: [facility] OR [address] [place] [address];  Service: Urology;  Laterality: N/A;    DILATION AND CURETTAGE OF UTERUS      FISTULOGRAM N/A 4/29/2024    Procedure: Fistulogram;  Surgeon: RODRIGO Friedman III, MD;  Location: [facility] CATH LAB;  Service: Peripheral Vascular;  Laterality: N/A;    GALLBLADDER SURGERY  2006    HYSTERECTOMY      INJECTION FOR SENTINEL NODE IDENTIFICATION Left 8/1/2019    Procedure: INJECTION, FOR SENTINEL NODE IDENTIFICATION;  Surgeon: Samia Fulton MD;  Location: [facility] OR [address] [place] [address];  Service: General;  Laterality: Left;    INJECTION OF BOTULINUM TOXIN TYPE A N/A 10/8/2018    Procedure: INJECTION, BOTULINUM TOXIN, TYPE A 300 UNITS;  Surgeon: Ronel Whittington MD;  Location: [facility] OR [address] [place] [address];  Service: Urology;  Laterality: N/A;    INJECTION OF BOTULINUM TOXIN TYPE A N/A 3/25/2019    Procedure: INJECTION, BOTULINUM TOXIN, TYPE A 300 UNITS;  Surgeon: Ronel Whittington MD;  Location: [facility] OR [address] [place] [address];  Service: Urology;  Laterality: N/A;    INJECTION OF BOTULINUM TOXIN TYPE A N/A 8/26/2019    Procedure: INJECTION, BOTULINUM TOXIN, TYPE A 300 UNITS;  Surgeon: Ronel Whittington MD;  Location: [facility] OR [address] [place] [address];  Service: Urology;  Laterality: N/A;    MASTECTOMY Left 8/1/2019    Procedure: MASTECTOMY 23 hour stay;  Surgeon: Samia Fulton MD;  Location: [facility] OR [address] [place] [address];  Service: General;  Laterality: Left;    MEDIPORT REMOVAL Right 6/24/2022    Procedure: REMOVAL, CATHETER, CENTRAL VENOUS, TUNNELED, WITH PORT;  Surgeon: Isauro Anderson MD;  Location: [facility] CATH LAB;  Service: Radiology;  Laterality: Right;    OVARIAN CYST SURGERY  1985    PERCUTANEOUS TRANSLUMINAL ANGIOPLASTY OF ARTERIOVENOUS FISTULA Left 4/29/2024    Procedure: PTA, AV FISTULA;  Surgeon: RODRIGO Friedman III, MD;  Location: [facility] CATH LAB;  Service: Peripheral Vascular;  Laterality:  "Left;    REPLACEMENT OF STENT Left 12/23/2021    Procedure: REPLACEMENT, STENT;  Surgeon: Ronel Whittington MD;  Location: [facility] OR 1ST FLR;  Service: Urology;  Laterality: Left;    REPLACEMENT OF STENT Left 2/18/2022    Procedure: REPLACEMENT, STENT;  Surgeon: Ronel Whittington MD;  Location: [facility] OR 1ST FLR;  Service: Urology;  Laterality: Left;    RETROGRADE PYELOGRAPHY Left 2/18/2022    Procedure: PYELOGRAM, RETROGRADE;  Surgeon: Ronel Whittington MD;  Location: [facility] OR 1ST FLR;  Service: Urology;  Laterality: Left;    SENTINEL LYMPH NODE BIOPSY Left 8/1/2019    Procedure: BIOPSY, LYMPH NODE, SENTINEL;  Surgeon: Samia Fulton MD;  Location: [facility] OR 2ND FLR;  Service: General;  Laterality: Left;    spt placement      TONSILLECTOMY, ADENOIDECTOMY      TOTAL ABDOMINAL HYSTERECTOMY W/ BILATERAL SALPINGOOPHORECTOMY  1985    URETEROSCOPY Left 2/18/2022    Procedure: URETEROSCOPY;  Surgeon: Ronel Whittington MD;  Location: [facility] OR 1ST FLR;  Service: Urology;  Laterality: Left;         Current Outpatient Medications:     amLODIPine (NORVASC) 10 MG tablet, Take 10 mg by mouth., Disp: , Rfl:     anastrozole (ARIMIDEX) 1 mg Tab, TAKE 1 TABLET BY MOUTH EVERY DAY, Disp: 90 tablet, Rfl: 2    apixaban (ELIQUIS) 5 mg Tab, Take 1 tablet (5 mg total) by mouth Daily., Disp: , Rfl:     atorvastatin (LIPITOR) 80 MG tablet, Take 1 tablet (80 mg total) by mouth once daily., Disp: 90 tablet, Rfl: 3    BD INSULIN SYRINGE ULTRA-FINE 0.5 mL 31 gauge x 5/16" Syrg, USE WITH INSULIN 4 TIMES A DAY, Disp: 100 each, Rfl: 12    calcium acetate,phosphat bind, (PHOSLO) 667 mg capsule, Take 667 mg by mouth 3 (three) times daily with meals., Disp: , Rfl:     carvediloL (COREG) 12.5 MG tablet, Take 1 tablet (12.5 mg total) by mouth 2 (two) times daily., Disp: 180 tablet, Rfl: 3    DULoxetine (CYMBALTA) 20 MG capsule, Take 2 capsules (40 mg total) by mouth once daily., Disp: 180 capsule, Rfl: 1    erenumab-aooe (AIMOVIG " "AUTOINJECTOR) 140 mg/mL autoinjector, 140 mg MONTHLY (route: subcutaneous), Disp: 1 each, Rfl: 11    ergocalciferol (ERGOCALCIFEROL) 50,000 unit Cap, Take 1 capsule (50,000 Units total) by mouth every 7 days., Disp: 12 capsule, Rfl: 3    HYDROcodone-acetaminophen (NORCO)  mg per tablet, Take 1 tablet by mouth every 6 (six) hours as needed for Pain., Disp: 120 tablet, Rfl: 0    LANTUS SOLOSTAR U-100 INSULIN 100 unit/mL (3 mL) InPn pen, INJECT 12-15 UNITS UNDER THE SKIN at night, Disp: 15 mL, Rfl: 3    losartan (COZAAR) 100 MG tablet, Take 1 tablet (100 mg total) by mouth once daily., Disp: 90 tablet, Rfl: 3    methocarbamoL (ROBAXIN) 750 MG Tab, TAKE 1-2 TABLETS (750-1,500 MG TOTAL) BY MOUTH 3 (THREE) TIMES DAILY AS NEEDED (MUSCLE SPASMS)., Disp: 180 tablet, Rfl: 1    ondansetron (ZOFRAN-ODT) 8 MG TbDL, 8 mg EVERY 12 HOURS (route: oral), Disp: , Rfl:     oxybutynin (DITROPAN-XL) 10 MG 24 hr tablet, TAKE 1 TABLET BY MOUTH EVERY DAY, Disp: 90 tablet, Rfl: 4    pen needle, diabetic (BD ULTRA-FINE SHORT PEN NEEDLE) 31 gauge x 5/16" Ndle, USE WITH LANTUS DAILY, e 11.65, Disp: 100 each, Rfl: 3    sodium bicarbonate 650 MG tablet, Take 1 tablet (650 mg total) by mouth once daily., Disp: 30 tablet, Rfl: 11    sumatriptan (IMITREX) 100 MG tablet, Take 1 tablet (100 mg total) by mouth every 2 (two) hours as needed for Migraine (Limit 2 in 14 hours and 9 in one month)., Disp: 30 tablet, Rfl: 1    SYNTHROID 50 mcg tablet, TAKE 1 TABLET BY MOUTH BEFORE BREAKFAST. ADMINISTER ON AN EMPTY STOMACH AT LEAST 30 MINUTES BEFORE FOOD. IF RECEIVING TUBE FEEDS, HOLD TUBE FEEDS FOR 1 HOUR BEFORE AND AFTER LEVOTHYROXINE ADMINISTRATION., Disp: 90 tablet, Rfl: 2    tenapanor 30 mg Tab, Take 30 mg by mouth 2 (two) times a day. Take 1 tablet in the morning with meal and 1 tablet in the afternoon with meal, Disp: , Rfl:     traZODone (DESYREL) 100 MG tablet, TAKE 1 TABLET BY MOUTH NIGHTLY AS NEEDED FOR INSOMNIA., Disp: 90 tablet, Rfl: 2    " VELPHORO 500 mg Chew, CHEW INSERT TABLET 5 TIMES DAILY WITH MEAL AND SNACK, Disp: , Rfl:     furosemide (LASIX) 40 MG tablet, Take 1 tablet (40 mg total) by mouth once daily., Disp: 30 tablet, Rfl: 11    miconazole NITRATE 2 % (MICOTIN) 2 % top powder, Apply topically 2 (two) times daily., Disp: 85 g, Rfl: 0    oxyCODONE (ROXICODONE) 5 MG immediate release tablet, Take 1 tablet (5 mg total) by mouth daily as needed (severe pain)., Disp: 30 tablet, Rfl: 0  No current facility-administered medications for this visit.    Facility-Administered Medications Ordered in Other Visits:     epoetin chloe injection 2,000 Units, 2,000 Units, Intravenous, 1 time in Clinic/HOD, Emmanuel Hare Jr., MD    heparin (porcine) injection 2,000 Units, 2,000 Units, Intravenous, Once, Emmanuel Hare Jr., MD    heparin (porcine) injection 2,000 Units, 2,000 Units, Intravenous, Once, Emmanuel Hare Jr., MD    heparin (porcine) injection 4,000 Units, 4,000 Units, Intra-Catheter, 1 time in Clinic/HOD, Emmanuel Hare Jr., MD    iron sucrose injection 100 mg, 100 mg, Intravenous, 1 time in Clinic/HOD, Emmanuel Hare Jr., MD    Review of patient's allergies indicates:  No Known Allergies    Family History   Problem Relation Name Age of Onset    Diabetes Sister Kat     Kidney disease Sister Kat         CKD III    ALS Mother          d.    Cancer Maternal Grandmother          d. colon    Cancer Paternal Grandfather          d. lung    Scoliosis Brother [place]         increased pain    Prostate cancer Brother [place]         cured s/p surgery    Cancer Brother [place]         rare cancer that got into the bones    Diabetes Maternal Aunt      Kidney disease Maternal Aunt      Diabetes Maternal Uncle      Amblyopia Neg Hx      Blindness Neg Hx      Cataracts Neg Hx      Glaucoma Neg Hx      Macular degeneration Neg Hx      Retinal detachment Neg Hx      Strabismus Neg Hx         Social History     Tobacco Use    Smoking  status: Never    Smokeless tobacco: Never   Substance Use Topics    Alcohol use: No     Alcohol/week: 0.0 standard drinks of alcohol    Drug use: No     PHYSICAL EXAM:   BP (!) 131/58 (BP Location: Right arm, Patient Position: Sitting, BP Method: Medium (Automatic))   Pulse 78   Temp 98.6 °F (37 °C) (Oral)   Constitutional:  Alert,   Well-appearing  In no distress.  Traveling by wheelchair   Neurological: Normal speech  no focal findings  CN II - XII grossly intact.    Psychiatric: Mood and affect appropriate and symmetric.   HEENT: Normocephalic / atraumatic  PERRLA  Midline trachea  No scars across the neck   Cardiac: Regular rate and rhythm.   Pulmonary: Normal pulmonary effort.   Abdomen: Soft, not distended.     Skin: Warm and well perfused.    Vascular:  Radial pulse on the left today is 1+ palpable, although it clearly stronger with fistula compression   Extremities/  Musculoskeletal: Left arm:  Left brachiocephalic AV fistula has significant excellent thrill with no significant pulsatility.  This is stable.  Prior hematoma has resolved    Left hand 5/5  strength.  The prior tiny 4th digit sore is completely healed, but now has similarly tiny issue at the base of the medial nail had on the 3rd digit   08/13/2024      IMAGING:  June 20, 2024 Duplex of the fistula shows no significant stenosis flow volume increased to 1.28 L  Diameter 9 point 2 proximal, 9.3 minute, 6.7 distal  Depth 6.3 proximal, 10.2 mid      IMPRESSION:   Resolution of hematoma, left AV fistula.  Clinically it is very well matured  Vascular steal, which potentially may have some symptomatology.    3.  Narcotic dependence for chronic back pain.  4.  Nonambulatory status x1 year  5.  On chronic Eliquis, evidently for atrial fibrillation diagnosed in 2021.  By clinical exam she does not feel that she is in AFib.  She was unaware of why she is on the Eliquis       PLAN:   Follow up with the primary care physician, to assess whether she  needs to be on chronic anticoagulation with Eliquis.  May begin cannulation of her left AV fistula tomorrow 09/04/2024  To further evaluate for left arm steal, right common femoral approach arch and left arm angiogram possible intervention 09/19/2024  Hold Eliquis 2 days prior    Notably, the patient may need a Ray lift in transfers for her angiogram    I have explained the risks, benefits and alternatives for this procedure in detail.  The patient voices understanding and all questions have be answered, and agrees to proceed with the procedure.     WALE Friedman III, MD, FACS  Professor and Chief, Vascular and Endovascular Surgery

## 2024-09-16 PROBLEM — A41.9 SEPSIS: Status: RESOLVED | Noted: 2024-06-12 | Resolved: 2024-09-16

## 2024-09-17 ENCOUNTER — TELEPHONE (OUTPATIENT)
Dept: OPTOMETRY | Facility: CLINIC | Age: 74
End: 2024-09-17
Payer: MEDICARE

## 2024-09-18 ENCOUNTER — TELEPHONE (OUTPATIENT)
Dept: VASCULAR SURGERY | Facility: CLINIC | Age: 74
End: 2024-09-18
Payer: MEDICARE

## 2024-09-18 NOTE — PRE-PROCEDURE INSTRUCTIONS
PreOp Instructions given:   - Verbal medication information (what to hold and what to take)   - NPO guidelines 2400  - Arrival place directions given; time to be given the day before procedure by the   Surgeon's Office DOSC  - Bathing with antibacterial soap   - Don't wear any jewelry or bring any valuables AM of surgery   - No makeup or moisturizer to face   - No perfume/cologne, powder, lotions or aftershave   Pt. verbalized understanding.   Pt denies any h/o Anesthesia/Sedation complications or side effects.  Patient does not know arrival time.  Explained that this information comes from the surgeon's office and if they haven't heard from them by 2 or 3 pm to call the office.  Patient stated an understanding.   IMAN LIFT NEEDED.

## 2024-09-19 ENCOUNTER — ANESTHESIA (OUTPATIENT)
Dept: SURGERY | Facility: HOSPITAL | Age: 74
End: 2024-09-19
Payer: MEDICARE

## 2024-09-19 ENCOUNTER — ANESTHESIA EVENT (OUTPATIENT)
Dept: SURGERY | Facility: HOSPITAL | Age: 74
End: 2024-09-19
Payer: MEDICARE

## 2024-09-19 ENCOUNTER — HOSPITAL ENCOUNTER (OUTPATIENT)
Facility: HOSPITAL | Age: 74
Discharge: HOME | End: 2024-09-19
Attending: SURGERY | Admitting: SURGERY
Payer: MEDICARE

## 2024-09-19 VITALS
DIASTOLIC BLOOD PRESSURE: 69 MMHG | WEIGHT: 228.63 LBS | SYSTOLIC BLOOD PRESSURE: 158 MMHG | TEMPERATURE: 98 F | HEIGHT: 68 IN | HEART RATE: 82 BPM | BODY MASS INDEX: 34.65 KG/M2 | OXYGEN SATURATION: 99 % | RESPIRATION RATE: 18 BRPM

## 2024-09-19 DIAGNOSIS — T82.898A PROBLEM WITH DIALYSIS ACCESS: ICD-10-CM

## 2024-09-19 DIAGNOSIS — R20.0 NUMBNESS OF FINGER: Primary | ICD-10-CM

## 2024-09-19 LAB
POCT GLUCOSE: 151 MG/DL (ref 70–110)
POCT GLUCOSE: 92 MG/DL (ref 70–110)

## 2024-09-19 PROCEDURE — 63600175 PHARM REV CODE 636 W HCPCS: Mod: HCNC | Performed by: SURGERY

## 2024-09-19 PROCEDURE — 36215 PLACE CATHETER IN ARTERY: CPT | Mod: 59,HCNC,, | Performed by: SURGERY

## 2024-09-19 PROCEDURE — 63600175 PHARM REV CODE 636 W HCPCS: Mod: HCNC

## 2024-09-19 PROCEDURE — C1894 INTRO/SHEATH, NON-LASER: HCPCS | Mod: HCNC | Performed by: SURGERY

## 2024-09-19 PROCEDURE — 25000003 PHARM REV CODE 250: Mod: HCNC

## 2024-09-19 PROCEDURE — 37000008 HC ANESTHESIA 1ST 15 MINUTES: Mod: HCNC | Performed by: SURGERY

## 2024-09-19 PROCEDURE — 75710 ARTERY X-RAYS ARM/LEG: CPT | Mod: 26,HCNC,, | Performed by: SURGERY

## 2024-09-19 PROCEDURE — 25500020 PHARM REV CODE 255: Mod: HCNC | Performed by: SURGERY

## 2024-09-19 PROCEDURE — 37000009 HC ANESTHESIA EA ADD 15 MINS: Mod: HCNC | Performed by: SURGERY

## 2024-09-19 PROCEDURE — 36000707: Mod: HCNC | Performed by: SURGERY

## 2024-09-19 PROCEDURE — C1769 GUIDE WIRE: HCPCS | Mod: HCNC | Performed by: SURGERY

## 2024-09-19 PROCEDURE — 36221 PLACE CATH THORACIC AORTA: CPT | Mod: 51,HCNC,, | Performed by: SURGERY

## 2024-09-19 PROCEDURE — C1760 CLOSURE DEV, VASC: HCPCS | Mod: HCNC | Performed by: SURGERY

## 2024-09-19 PROCEDURE — 82962 GLUCOSE BLOOD TEST: CPT | Mod: HCNC | Performed by: SURGERY

## 2024-09-19 PROCEDURE — 71000015 HC POSTOP RECOV 1ST HR: Mod: HCNC | Performed by: SURGERY

## 2024-09-19 PROCEDURE — 71000016 HC POSTOP RECOV ADDL HR: Mod: HCNC | Performed by: SURGERY

## 2024-09-19 PROCEDURE — C1887 CATHETER, GUIDING: HCPCS | Mod: HCNC | Performed by: SURGERY

## 2024-09-19 PROCEDURE — 25000003 PHARM REV CODE 250: Mod: HCNC | Performed by: ANESTHESIOLOGY

## 2024-09-19 PROCEDURE — 36000706: Mod: HCNC | Performed by: SURGERY

## 2024-09-19 PROCEDURE — 71000044 HC DOSC ROUTINE RECOVERY FIRST HOUR: Mod: HCNC | Performed by: SURGERY

## 2024-09-19 RX ORDER — IODIXANOL 320 MG/ML
INJECTION, SOLUTION INTRAVASCULAR
Status: DISCONTINUED | OUTPATIENT
Start: 2024-09-19 | End: 2024-09-19 | Stop reason: HOSPADM

## 2024-09-19 RX ORDER — MIDAZOLAM HYDROCHLORIDE 1 MG/ML
INJECTION INTRAMUSCULAR; INTRAVENOUS
Status: DISCONTINUED | OUTPATIENT
Start: 2024-09-19 | End: 2024-09-19

## 2024-09-19 RX ORDER — OXYCODONE HYDROCHLORIDE 5 MG/1
5 TABLET ORAL ONCE AS NEEDED
Status: COMPLETED | OUTPATIENT
Start: 2024-09-19 | End: 2024-09-19

## 2024-09-19 RX ORDER — PROTAMINE SULFATE 10 MG/ML
INJECTION, SOLUTION INTRAVENOUS
Status: DISCONTINUED | OUTPATIENT
Start: 2024-09-19 | End: 2024-09-19

## 2024-09-19 RX ORDER — HEPARIN SOD,PORCINE/0.9 % NACL 1000/500ML
INTRAVENOUS SOLUTION INTRAVENOUS
Status: DISCONTINUED | OUTPATIENT
Start: 2024-09-19 | End: 2024-09-19 | Stop reason: HOSPADM

## 2024-09-19 RX ORDER — HEPARIN SODIUM 1000 [USP'U]/ML
INJECTION, SOLUTION INTRAVENOUS; SUBCUTANEOUS
Status: DISCONTINUED | OUTPATIENT
Start: 2024-09-19 | End: 2024-09-19

## 2024-09-19 RX ORDER — SODIUM CHLORIDE 9 MG/ML
INJECTION, SOLUTION INTRAVENOUS CONTINUOUS
Status: DISCONTINUED | OUTPATIENT
Start: 2024-09-19 | End: 2024-09-20 | Stop reason: HOSPADM

## 2024-09-19 RX ORDER — CEFAZOLIN SODIUM 1 G/3ML
INJECTION, POWDER, FOR SOLUTION INTRAMUSCULAR; INTRAVENOUS
Status: DISCONTINUED | OUTPATIENT
Start: 2024-09-19 | End: 2024-09-19

## 2024-09-19 RX ORDER — ACETAMINOPHEN 325 MG/1
650 TABLET ORAL EVERY 6 HOURS PRN
COMMUNITY
Start: 2024-09-19

## 2024-09-19 RX ADMIN — OXYCODONE HYDROCHLORIDE 5 MG: 5 TABLET ORAL at 12:09

## 2024-09-19 RX ADMIN — MIDAZOLAM HYDROCHLORIDE 0.5 MG: 2 INJECTION, SOLUTION INTRAMUSCULAR; INTRAVENOUS at 10:09

## 2024-09-19 RX ADMIN — HEPARIN SODIUM 8000 UNITS: 1000 INJECTION, SOLUTION INTRAVENOUS; SUBCUTANEOUS at 11:09

## 2024-09-19 RX ADMIN — PROTAMINE SULFATE 75 MG: 10 INJECTION, SOLUTION INTRAVENOUS at 11:09

## 2024-09-19 RX ADMIN — CEFAZOLIN 2 G: 330 INJECTION, POWDER, FOR SOLUTION INTRAMUSCULAR; INTRAVENOUS at 10:09

## 2024-09-19 RX ADMIN — SODIUM CHLORIDE: 9 INJECTION, SOLUTION INTRAVENOUS at 10:09

## 2024-09-19 NOTE — TRANSFER OF CARE
"Anesthesia Transfer of Care Note    Patient: Cecile Bowen    Procedure(s) Performed: Procedure(s) (LRB):  Angiogram Extremity Bilateral (N/A)    Patient location: [facility]    Anesthesia Type: MAC    Transport from OR: Transported from OR on room air with adequate spontaneous ventilation    Post pain: adequate analgesia    Post assessment: no apparent anesthetic complications and tolerated procedure well    Post vital signs: stable    Level of consciousness: awake and alert    Nausea/Vomiting: no nausea/vomiting    Complications: none    Transfer of care protocol was followed      Last vitals: Visit Vitals  BP (!) 111/57 (BP Location: Right arm, Patient Position: Lying)   Pulse 70   Temp 36.6 °C (97.9 °F) (Temporal)   Resp 18   Ht 5' 8" (1.727 m)   Wt 103.7 kg (228 lb 9.9 oz)   SpO2 99%   Breastfeeding No   BMI 34.76 kg/m²     "

## 2024-09-19 NOTE — ANESTHESIA PREPROCEDURE EVALUATION
09/19/2024  Cecile Bowen is a 74 y.o., female.      Pre-op Assessment    I have reviewed the Patient Summary Reports.     I have reviewed the Nursing Notes. I have reviewed the NPO Status.   I have reviewed the Medications.     Review of Systems  Anesthesia Hx:  No problems with previous Anesthesia   History of prior surgery of interest to airway management or planning:  Previous anesthesia: General          Denies Personal Hx of Anesthesia complications.                    Cardiovascular:     Hypertension                                        Pulmonary:  Pulmonary Normal                       Renal/:  Chronic Renal Disease                Hepatic/GI:      Liver Disease,            Musculoskeletal:  Arthritis               Neurological:    Neuromuscular Disease,  Headaches                                 Endocrine:  Diabetes Hypothyroidism          Psych:  Psychiatric History                Patient Active Problem List   Diagnosis    Hypothyroidism    Fibromyalgia    Intractable chronic migraine without aura    Vitamin D deficiency disease    Hyperlipidemia associated with type 2 diabetes mellitus    VIJAYA (obstructive sleep apnea)    Hyponatremia    Abnormality of gait and mobility    Osteoarthritis of lumbar spine    Sideroblastic anemia    Iron deficiency anemia, unspecified    Primary osteoarthritis involving multiple joints    Retention of urine    Metabolic acidosis    Neurogenic bladder    Major depressive disorder, recurrent, moderate    Insomnia    Mixed migraine and muscle contraction headache    Secondary hyperparathyroidism, renal    Neuromuscular dysfunction of bladder, unspecified    Pulmonary nodule    Osteopenia    Class 1 obesity with serious comorbidity and body mass index (BMI) of 33.0 to 33.9 in adult    Chronic daily headache    Long term prescription opiate use    Lumbar spondylosis     Chronic pain    Cervicogenic migraine    Malignant neoplasm of left breast, estrogen receptor positive    Risk for falls    Facet arthritis of lumbar region    Obesity, diabetes, and hypertension syndrome    Requires daily assistance for activities of daily living (ADL) and comfort needs    S/P left mastectomy    Prophylactic use of anastrozole (Arimidex)    High priority for 2019 novel coronavirus vaccination    CKD (chronic kidney disease) stage 4, GFR 15-29 ml/min    Nephrotic range proteinuria    Left ureteral stone    Nephrolithiasis    Transaminitis    Chronic obstructive pyelonephritis    UTI (urinary tract infection)    Normocytic anemia    Acute kidney injury superimposed on CKD    Unspecified atrial fibrillation    Debility    Encounter for palliative care    Constipation    Chronic kidney disease    Anemia of chronic disease    Urinary tract infection due to ESBL Klebsiella    Pressure injury of right buttock, stage 3    ESRD (end stage renal disease) on dialysis    Acute cystitis with hematuria    Uncomplicated opioid dependence    History of falling    Long term current use of insulin    Personal history of malignant neoplasm of breast    Aortic atherosclerosis    Diabetic polyneuropathy associated with type 2 diabetes mellitus    Bacteria in urine    Hyperphosphatemia    Irritant contact dermatitis due to fecal, urinary or dual incontinence    Volume overload    Elevated LFTs    Hypertension secondary to endocrine disorders    Fatty liver disease, nonalcoholic    Elevated alkaline phosphatase level    Positive JASON (antinuclear antibody)    Liver fibrosis    Ds DNA antibody positive    HTN (hypertension)    Type 2 diabetes mellitus with kidney complication, with long-term current use of insulin    Numbness of finger     Past Medical History:   Diagnosis Date    Cervicogenic migraine     Chronic pain     CKD (chronic kidney disease) stage 4, GFR 15-29 ml/min     [facility]    CKD (chronic kidney  disease) stage 4, GFR 15-29 ml/min     Diabetes mellitus     Long term use of Insulin, Diabetic Neuropathy    Dialysis patient 1/21/2022    Fibromyalgia     Hydronephrosis     Hyperlipidemia     Hypertension 12/12/2012    Hypothyroidism 12/12/2012    ARON (iron deficiency anemia)     Insomnia     Levoscoliosis     Malignant neoplasm of upper-outer quadrant of left breast in female, estrogen receptor positive     Metabolic acidosis     Mobility impaired     Nuclear sclerosis - Both Eyes 3/24/2014    VIJAYA (obstructive sleep apnea)     Osteopenia     Pulmonary nodule     Recurrent UTI     Renal manifestation of secondary diabetes mellitus     Secondary hyperparathyroidism, renal     Urinary retention      Past Surgical History:   Procedure Laterality Date    AV FISTULA PLACEMENT Left 2/14/2024    Procedure: CREATION, AV FISTULA;  Surgeon: RODRIGO Friedman III, MD;  Location: [facility] OR 2ND FLR;  Service: Vascular;  Laterality: Left;  LUE AVF creation vs AVG insertion    BIOPSY OF URETER Left 2/18/2022    Procedure: BIOPSY, URETER;  Surgeon: Ronel Whittington MD;  Location: [facility] OR 1ST FLR;  Service: Urology;  Laterality: Left;    BREAST BIOPSY Right     benign    CHOLECYSTECTOMY      COLONOSCOPY N/A 1/13/2017    Procedure: COLONOSCOPY;  Surgeon: Morris Wiseman MD;  Location: [facility] ENDO (4TH FLR);  Service: Endoscopy;  Laterality: N/A;  Renal pt Nephrology advised to avoid phosphate preps    COLONOSCOPY N/A 12/7/2023    Procedure: COLONOSCOPY;  Surgeon: Herve Allen MD;  Location: [facility] ENDO (2ND FLR);  Service: Endoscopy;  Laterality: N/A;  HD MWF; labs prior  suprapubic catheter  pt does not ambulate-uses christina lift- will have sling with her  9/7 ref. by Anastasiia Campbell DO, PEG, instr. mailed, Eliquis hold approval pending-[facility] to hold Eliquis 2 days per Dr Navarro-GT  1/30-precall complete-MS    CYSTOSCOPY N/A 10/8/2018    Procedure: CYSTOSCOPY;  Surgeon: Ronel Whittington MD;  Location: [facility] OR  1ST FLR;  Service: Urology;  Laterality: N/A;  45 min    CYSTOSCOPY N/A 3/25/2019    Procedure: CYSTOSCOPY;  Surgeon: Ronel Whittington MD;  Location: [facility] OR 1ST FLR;  Service: Urology;  Laterality: N/A;  45 min    CYSTOSCOPY N/A 8/26/2019    Procedure: CYSTOSCOPY;  Surgeon: Ronel Whittington MD;  Location: [facility] OR 1ST FLR;  Service: Urology;  Laterality: N/A;  45 min    CYSTOSCOPY N/A 7/2/2021    Procedure: CYSTOSCOPY;  Surgeon: Ronel Whittington MD;  Location: [facility] OR 1ST FLR;  Service: Urology;  Laterality: N/A;    CYSTOSCOPY  12/23/2021    Procedure: CYSTOSCOPY;  Surgeon: Ronel Whittington MD;  [facility];  Service: Urology;;    CYSTOSCOPY  2/18/2022    Procedure: CYSTOSCOPY;  Surgeon: Ronel Whittington MD;  [facility];  Service: Urology;;    CYSTOSCOPY W/ URETERAL STENT PLACEMENT Left 5/15/2021    Procedure: CYSTOSCOPY, WITH URETERAL STENT INSERTION;  Surgeon: Levy Sánchez Jr., MD;  [facility];  Service: Urology;  Laterality: Left;    CYSTOSCOPY WITH BIOPSY OF BLADDER N/A 1/27/2020    Procedure: CYSTOSCOPY, WITH BLADDER BIOPSY, WITH FULGURATION IF INDICATED;  Surgeon: Ronel Whittington MD;  [facility];  Service: Urology;  Laterality: N/A;    DILATION AND CURETTAGE OF UTERUS      FISTULOGRAM N/A 4/29/2024    Procedure: Fistulogram;  Surgeon: RODRIGO Friedman III, MD;  Location: [facility] CATH LAB;  Service: Peripheral Vascular;  Laterality: N/A;    GALLBLADDER SURGERY  2006    HYSTERECTOMY      INJECTION FOR SENTINEL NODE IDENTIFICATION Left 8/1/2019    Procedure: INJECTION, FOR SENTINEL NODE IDENTIFICATION;  Surgeon: Samia Fulton MD;  Location: [facility] OR 2ND FLR;  Service: General;  Laterality: Left;    INJECTION OF BOTULINUM TOXIN TYPE A N/A 10/8/2018    Procedure: INJECTION, BOTULINUM TOXIN, TYPE A 300 UNITS;  Surgeon: Ronel Whittington MD;  Location: [facility] OR [address] [place] [address];  Service: Urology;  Laterality: N/A;    INJECTION  OF BOTULINUM TOXIN TYPE A N/A 3/25/2019    Procedure: INJECTION, BOTULINUM TOXIN, TYPE A 300 UNITS;  Surgeon: Ronel Whittington MD;  Location: [facility] OR [address] [place] [address];  Service: Urology;  Laterality: N/A;    INJECTION OF BOTULINUM TOXIN TYPE A N/A 8/26/2019    Procedure: INJECTION, BOTULINUM TOXIN, TYPE A 300 UNITS;  Surgeon: Ronel Whittington MD;  Location: [facility] OR [address] [place] [address];  Service: Urology;  Laterality: N/A;    MASTECTOMY Left 8/1/2019    Procedure: MASTECTOMY 23 hour stay;  Surgeon: Samia Fulton MD;  Location: [facility] OR [address] [place] [address];  Service: General;  Laterality: Left;    MEDIPORT REMOVAL Right 6/24/2022    Procedure: REMOVAL, CATHETER, CENTRAL VENOUS, TUNNELED, WITH PORT;  Surgeon: Isauro Anderson MD;  Location: [facility] CATH LAB;  Service: Radiology;  Laterality: Right;    OVARIAN CYST SURGERY  1985    PERCUTANEOUS TRANSLUMINAL ANGIOPLASTY OF ARTERIOVENOUS FISTULA Left 4/29/2024    Procedure: PTA, AV FISTULA;  Surgeon: RODRIGO Friedman III, MD;  Location: [facility] CATH LAB;  Service: Peripheral Vascular;  Laterality: Left;    REPLACEMENT OF STENT Left 12/23/2021    Procedure: REPLACEMENT, STENT;  Surgeon: Ronel Whittington MD;  [facility];  Service: Urology;  Laterality: Left;    REPLACEMENT OF STENT Left 2/18/2022    Procedure: REPLACEMENT, STENT;  Surgeon: Ronel Whittington MD;  Location: [facility] OR [address] [place] [address];  Service: Urology;  Laterality: Left;    RETROGRADE PYELOGRAPHY Left 2/18/2022    Procedure: PYELOGRAM, RETROGRADE;  Surgeon: Ronel Whittington MD;  Location: [facility] OR [address] [place] [place] [address];  Service: Urology;  Laterality: Left;    SENTINEL LYMPH NODE BIOPSY Left 8/1/2019    Procedure: BIOPSY, LYMPH NODE, SENTINEL;  Surgeon: Samia Fulton MD;  [facility];  Service: General;  Laterality: Left;    spt placement      TONSILLECTOMY, ADENOIDECTOMY      TOTAL ABDOMINAL HYSTERECTOMY W/ BILATERAL SALPINGOOPHORECTOMY  1985    URETEROSCOPY Left 2/18/2022    Procedure: URETEROSCOPY;  Surgeon:  Ronel Whittington MD;  Location: [address];  Service: Urology;  Laterality: Left;         Physical Exam  General: Well nourished, Cooperative and Alert    Airway:  Mallampati: II   Mouth Opening: Normal  TM Distance: Normal  Tongue: Normal  Neck ROM: Normal ROM    Dental:  Intact    Chest/Lungs:  Normal Respiratory Rate    Heart:  Rate: Normal  Rhythm: Regular Rhythm      Lab Results   Component Value Date    WBC 4.89 09/04/2024    HGB 10.1 (L) 09/04/2024    HCT 31.1 (L) 09/04/2024    MCV 96 09/04/2024     09/04/2024       BMP  Lab Results   Component Value Date     (L) 09/04/2024    K 4.9 09/04/2024     09/04/2024    CO2 22 (L) 06/15/2024    BUN 27 (H) 06/15/2024    CREATININE 3.05 (H) 09/04/2024    CALCIUM 8.3 (L) 09/04/2024    ANIONGAP 9 06/15/2024    EGFRNORACEVR 20.1 (A) 06/15/2024         Anesthesia Plan  Type of Anesthesia, risks & benefits discussed:    Anesthesia Type: Gen Natural Airway, Gen ETT, MAC  Intra-op Monitoring Plan: Standard ASA Monitors  Post Op Pain Control Plan: multimodal analgesia  Induction:  IV  Airway Plan: Direct, Post-Induction  Informed Consent: Informed consent signed with the Patient and all parties understand the risks and agree with anesthesia plan.  All questions answered.   ASA Score: 4  Day of Surgery Review of History & Physical: H&P Update referred to the surgeon/provider.    Ready For Surgery From Anesthesia Perspective.     .

## 2024-09-19 NOTE — PLAN OF CARE
1330 Pt Aox4. VSS. No signs of distress. Pt stated pain is better controlled. Patient educated and given discharge instructions at bedside. Tolerating PO liquids. All questions answered. IV removed. Pt ready for discharge once transport arrives.    1345 Pt transported to wheelchair via Ray lift. Transport called and notified charge RN that they had arrived. Pt wheeled out by RN.

## 2024-09-19 NOTE — BRIEF OP NOTE
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 [T82.898A]    Post-op Diagnosis:  Post-Op Diagnosis Codes:     * Vascular steal syndrome after procedure [T82.898A]    PROCEDURES:    Arch and selective L arm angiogram  US guided R CFA access    Anesthesia: Local MAC    Operative Findings: No L subclavian/axillary/brachial artery stenosis; signif physiologic steal    Estimated Blood Loss: <4cc         Specimens:   Specimen (24h ago, onward)      None              Discharge Note    OUTCOME: successful outpatient procedure     DISPOSITION: home    FINAL DIAGNOSIS:  ESRD, vascular steal    FOLLOWUP: 2 weeks with L finger PPGs with and without graft compression, and quantitative AVF duplex    DISCHARGE INSTRUCTIONS:  see below

## 2024-09-19 NOTE — DISCHARGE INSTRUCTIONS
Continue taking all of your home medications    Remove dressing in one days  You may shower at that time, no soaking the wound (Bath, pool, hot tub, etc) for two weeks  Reasons to call the office:  - Fever over 101F  - Signs of infection at the wound (redness, pain, warmth, pus)  - Numbness, tingling, or pain in the leg/foot/groin  - Extensive bruising at the groin  - Firmness or bulge in the groin  - Bleeding from the access site in your groin    Once you go home, please abide by the following restrictions:     Rest in bed or a recliner for the remainder of the day following the procedure.      You should not go home alone the day of the procedure.     Lifting and activity restrictions depend on the insertion site for the procedure:     Groin:   - No heavy lifting of more than 5 pounds, running, swimming, or strenuous walking for at least 2 days after the angiogram.   - You may return to your usual activity after the two days.   - Do not take a hot bath or shower for at least 12 hours after discharge.     Keep an adhesive bandage on the catheter insertion site for one day to help prevent infection.     Because of the sedation you were given for your procedure, we recommend that you have someone stay with you overnight following your procedure.  - Do not drive a car or operate machinery for the remainder of the day.  - Do not consume any alcoholic beverages for the remainder of the day.  - Postpone signing any important papers or making any important decision for the remainder of the day.  - Do not take any muscle relaxants, sedatives, hypnotics, or mood-altering medication today unless ordered by your physician who is aware you are taking the medication today.     If bleeding occurs:  - Apply manual pressure, and immediately seek medical attention at the nearest hospital.   - Seek immediate medical attention if you experience loss of sensation, redness, swelling or discharge from the insertion site.

## 2024-09-19 NOTE — OP NOTE
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 female who presented with concern for left upper extremity steal syndrome.  She was offered an arch and left upper extremity angiogram with possible intervention. All risks, benefits, and alternatives were discussed and the patient understood and wished to proceed and gave written consent.     Operative Details:   Patient was brought to the operating room and placed in the operating room table in supine position.  She received sedation provided by Anesthesiology.  The bilateral groins were prepped and draped in the usual sterile fashion.  Perioperative antibiotics were administered.    Under ultrasound guidance, the right common femoral artery was accessed with a micropuncture needle, followed by a Mandril wire, followed by micropuncture sheath.  Sheath a g confirmed accessed into the common femoral artery.  Following this, a stiff angled glidewire was advanced up into the aortic arch, over which this was exchanged for a 6 Malay short sheath.  A pigtail catheter was then placed in the arch.  An arch angiogram was performed, which did not demonstrate any evidence of inflow disease, patent subclavian, axillary, brachial artery.  We then used the stiff angled glidewire within a angled glide cath to select the left subclavian artery.  There did appear to  be some difficulty passing the wire through the subclavian artery, however the wire was able to be passed down into the brachial artery.  The Glidecath was then advanced into the brachial artery.  A selective left upper extremity angiogram was performed with and without compression of the AV fistula, which demonstrated significant physiologic steal through the fistula.     Next, given that there was difficulty advancing the glidewire through the subclavian, we elected to investigate this further.  The patient was systemically heparinized with IV heparin.  A 6 x 90 cm sheath was then advanced into the proximal subclavian artery.  A repeat arteriogram in multiple views was performed, which did not demonstrate any evidence of stenosis at the subclavian.    This concluded the intervention.  Given that her finger wounds did appear to be healing, we will elect to observe this and perform repeat duplex in 2 weeks.     The protamine was then given to reverse the heparin.  A Perclose device was deployed in the right groin without issue.  The skin was closed with 4-0 Monocryl.  Dermabond and a dry sterile dressing were applied.  Dr. Friedman was present and scrubbed for all aspects of the case.    All instrument and sponge counts were confirmed correct.

## 2024-09-23 ENCOUNTER — PATIENT MESSAGE (OUTPATIENT)
Dept: PHYSICAL MEDICINE AND REHAB | Facility: CLINIC | Age: 74
End: 2024-09-23
Payer: MEDICARE

## 2024-09-23 RX ORDER — LEVOTHYROXINE SODIUM 50 UG/1
TABLET ORAL
Qty: 90 TABLET | Refills: 2 | Status: SHIPPED | OUTPATIENT
Start: 2024-09-23

## 2024-09-24 RX ORDER — OXYCODONE HYDROCHLORIDE 5 MG/1
5 TABLET ORAL DAILY PRN
Qty: 30 TABLET | Refills: 0 | Status: SHIPPED | OUTPATIENT
Start: 2024-09-29 | End: 2024-10-29

## 2024-09-24 RX ORDER — HYDROCODONE BITARTRATE AND ACETAMINOPHEN 10; 325 MG/1; MG/1
1 TABLET ORAL EVERY 6 HOURS PRN
Qty: 120 TABLET | Refills: 0 | Status: SHIPPED | OUTPATIENT
Start: 2024-09-24 | End: 2024-10-24

## 2024-09-25 ENCOUNTER — EXTERNAL HOME HEALTH (OUTPATIENT)
Dept: HOME HEALTH SERVICES | Facility: HOSPITAL | Age: 74
End: 2024-09-25
Payer: MEDICARE

## 2024-09-27 NOTE — ANESTHESIA POSTPROCEDURE EVALUATION
Anesthesia Post Evaluation    Patient: Cecile Bowen    Procedure(s) Performed: Procedure(s) (LRB):  Angiogram Extremity Bilateral (N/A)    Final Anesthesia Type: general      Patient location during evaluation: [facility]  Patient participation: Yes- Able to Participate  Level of consciousness: awake and alert and oriented  Post-procedure vital signs: reviewed and stable  Pain management: adequate  Airway patency: patent    PONV status at discharge: No PONV  Anesthetic complications: no      Cardiovascular status: hemodynamically stable  Respiratory status: unassisted, spontaneous ventilation and room air  Hydration status: euvolemic  Follow-up not needed.              Vitals Value Taken Time   /69 09/19/24 1320   Temp 36.7 °C (98.1 °F) 09/19/24 1320   Pulse 78 09/19/24 1320   Resp 18 09/19/24 1320   SpO2 98 % 09/19/24 1320         No case tracking events are documented in the log.      Pain/Audrey Score: No data recorded

## 2024-09-28 NOTE — PROGRESS NOTES
74 y.o. femalepresents in f/u to diabetes, hypertension, and dyslipidemia  DM w/ neuropathy/ CKD 4/tx w/  Denied increased thirst, urination, or hypoglycemia episodes  A1C ==>    Lab Results   Component Value Date    HGBA1C 6.2 (H) 04/22/2024         HTN associated DM tx w/  Denied HA or dizziness  BP today==>136/62    Dyslipidemia associated w/ DM tx w/atorvastatin 80mg  Denied unusual muscle pain or chest pain  LDL==>  Lab Results   Component Value Date    CHOL 144 04/22/2024    CHOL 147 08/21/2023    CHOL 190 01/31/2023     Lab Results   Component Value Date    HDL 36 04/22/2024    HDL 42 08/21/2023    HDL 37 (L) 01/31/2023     Lab Results   Component Value Date    LDLCALC 85 04/22/2024    LDLCALC 72 08/21/2023    LDLCALC 127.4 01/31/2023     Lab Results   Component Value Date    TRIG 117 04/22/2024    TRIG 166 (H) 08/21/2023    TRIG 128 01/31/2023     Lab Results   Component Value Date    CHOLHDL 3.5 08/21/2023    CHOLHDL 19.5 (L) 01/31/2023    CHOLHDL 5.8 (H) 02/09/2022     Hypothyroidism, stable off RX  Denied heat or cold intolerance  TSH==> 3.622    ROS:    + dry mouth  No fever, chills, or night sweats  No blurry vision or visual disturbance  No dysphagia or early satiety  No palpitations or tachycardia  No cough or wheezing  No GERD or abdominal pain  No numbness or focal deficits  Remainder of review negative except as previously noted    PAST MEDICAL HX: Reviewed  PAST SURGICAL HX: Reviewed  SOCIAL HX: Reviewed  FAMILY HX: Reviewed    PHYSICAL EXAM:  VS: As noted  GENERAL: WDWN, A&O, NAD, conversant and co-operative  EYES: Conj/lids unremarkable, sclera anicteric,   NECK: Supple w/o lymphadenopathy or thyromegaly  RESPIRATORY: Efforts are unlabored. Lungs CTAP  CARDIOVASCULAR: Heart ST. No carotid bruits noted.   1+ pedal pulses. 2+ LE edema  GASTROINTESTINAL: BS+, soft , NT/ND, no HSM noted  MUSCULOSKELETAL: Gait impaired, patient in wheelchair   No CCE  NEUROLOGIC: DONALD. No tremor noted  SKIN: Warm and  dry    IMPRESSION/PLAN:  DM w/ neuropathy, hypoglycemia   -decrease lantus to 6-8 U if BS >145  Now take 8-10U at night and pt has had 3 nights of hypoglycemia- BS < 70  -continue f/up MS. Gabriel  CKD 5, on dialysis 2/2 DM  -continue under care of Nephrology  Hypertension associated with diabetes, stable   -continue clonidine 0.1 b.i.d.  Hyperlipidemia associated with diabetes, stable   -continue atorvastatin 80 mg q.day  Hypothyroidism, stable   -continue Synthroid 50 mcg q.d.  Afib, stable   -continue Eliquis 2.5 b.i.d.  Breast cancer, stable   -continue Arimidex 1 mg q.day, under care of Oncology  Moderate major depression, stable   -continue Cymbalta 20 mg b.i.d.  HM  -eye exam pending  -DEXA order placed unable to have image done 2/2 to weight and physical restriction  -mammo order placed  -lab to try and co-ordinate w/ other practitioners  -rtc 6 mos

## 2024-10-02 ENCOUNTER — OFFICE VISIT (OUTPATIENT)
Dept: INTERNAL MEDICINE | Facility: CLINIC | Age: 74
End: 2024-10-02
Payer: MEDICARE

## 2024-10-02 VITALS
OXYGEN SATURATION: 98 % | SYSTOLIC BLOOD PRESSURE: 128 MMHG | RESPIRATION RATE: 16 BRPM | HEIGHT: 68 IN | DIASTOLIC BLOOD PRESSURE: 70 MMHG | TEMPERATURE: 97 F | HEART RATE: 66 BPM | BODY MASS INDEX: 34.76 KG/M2

## 2024-10-02 DIAGNOSIS — E78.5 HYPERLIPIDEMIA ASSOCIATED WITH TYPE 2 DIABETES MELLITUS: ICD-10-CM

## 2024-10-02 DIAGNOSIS — C50.612 MALIGNANT NEOPLASM OF AXILLARY TAIL OF LEFT BREAST IN FEMALE, ESTROGEN RECEPTOR POSITIVE: ICD-10-CM

## 2024-10-02 DIAGNOSIS — Z17.0 MALIGNANT NEOPLASM OF AXILLARY TAIL OF LEFT BREAST IN FEMALE, ESTROGEN RECEPTOR POSITIVE: ICD-10-CM

## 2024-10-02 DIAGNOSIS — E11.42 DIABETIC POLYNEUROPATHY ASSOCIATED WITH TYPE 2 DIABETES MELLITUS: Primary | ICD-10-CM

## 2024-10-02 DIAGNOSIS — I15.2 HYPERTENSION ASSOCIATED WITH DIABETES: ICD-10-CM

## 2024-10-02 DIAGNOSIS — N18.6 ESRD (END STAGE RENAL DISEASE) ON DIALYSIS: ICD-10-CM

## 2024-10-02 DIAGNOSIS — F33.1 MAJOR DEPRESSIVE DISORDER, RECURRENT, MODERATE: ICD-10-CM

## 2024-10-02 DIAGNOSIS — E11.59 HYPERTENSION ASSOCIATED WITH DIABETES: ICD-10-CM

## 2024-10-02 DIAGNOSIS — Z99.2 ESRD (END STAGE RENAL DISEASE) ON DIALYSIS: ICD-10-CM

## 2024-10-02 DIAGNOSIS — E03.9 ACQUIRED HYPOTHYROIDISM: ICD-10-CM

## 2024-10-02 DIAGNOSIS — E11.69 HYPERLIPIDEMIA ASSOCIATED WITH TYPE 2 DIABETES MELLITUS: ICD-10-CM

## 2024-10-02 DIAGNOSIS — I48.91 ATRIAL FIBRILLATION, UNSPECIFIED TYPE: ICD-10-CM

## 2024-10-02 PROCEDURE — 99999 PR PBB SHADOW E&M-EST. PATIENT-LVL V: CPT | Mod: PBBFAC,HCNC,, | Performed by: INTERNAL MEDICINE

## 2024-10-02 RX ORDER — DICLOFENAC SODIUM 10 MG/G
2 GEL TOPICAL DAILY
COMMUNITY

## 2024-10-09 ENCOUNTER — TELEPHONE (OUTPATIENT)
Dept: VASCULAR SURGERY | Facility: CLINIC | Age: 74
End: 2024-10-09
Payer: MEDICARE

## 2024-10-09 DIAGNOSIS — N18.6 ESRD (END STAGE RENAL DISEASE) ON DIALYSIS: Primary | ICD-10-CM

## 2024-10-09 DIAGNOSIS — Z99.2 ESRD (END STAGE RENAL DISEASE) ON DIALYSIS: Primary | ICD-10-CM

## 2024-10-09 NOTE — TELEPHONE ENCOUNTER
Contacted pt to schedule appts. Appointment scheduled, pt verified. Instructed pt to hold Eliquis for 2 days prior to appt with last dose being taken on Saturday 10/12/24. Pt repeated instructions and verbalized understanding.  ----- Message from RODRIGO Friedman MD sent at 9/19/2024 11:18 AM CDT -----  F/u 3 weeks with quant AVF duplex, L finger PPGs with and without AVF compression    HOLD eliquis for 2 days prior to clinic visit, possible permacath removal

## 2024-10-10 ENCOUNTER — PATIENT MESSAGE (OUTPATIENT)
Dept: PHYSICAL MEDICINE AND REHAB | Facility: CLINIC | Age: 74
End: 2024-10-10
Payer: MEDICARE

## 2024-10-10 DIAGNOSIS — M54.2 CHRONIC NECK PAIN: ICD-10-CM

## 2024-10-10 DIAGNOSIS — M54.50 CHRONIC MIDLINE LOW BACK PAIN WITHOUT SCIATICA: ICD-10-CM

## 2024-10-10 DIAGNOSIS — G89.29 CHRONIC NECK PAIN: ICD-10-CM

## 2024-10-10 DIAGNOSIS — G89.29 CHRONIC MIDLINE LOW BACK PAIN WITHOUT SCIATICA: ICD-10-CM

## 2024-10-10 DIAGNOSIS — M79.7 FIBROMYALGIA: ICD-10-CM

## 2024-10-11 RX ORDER — METHOCARBAMOL 750 MG/1
TABLET, FILM COATED ORAL
Qty: 180 TABLET | Refills: 1 | Status: SHIPPED | OUTPATIENT
Start: 2024-10-11

## 2024-10-11 RX ORDER — HYDROCODONE BITARTRATE AND ACETAMINOPHEN 10; 325 MG/1; MG/1
1 TABLET ORAL EVERY 6 HOURS PRN
Qty: 120 TABLET | Refills: 0 | Status: SHIPPED | OUTPATIENT
Start: 2024-10-24 | End: 2024-11-23

## 2024-10-11 RX ORDER — OXYCODONE HYDROCHLORIDE 5 MG/1
5 TABLET ORAL DAILY PRN
Qty: 30 TABLET | Refills: 0 | Status: SHIPPED | OUTPATIENT
Start: 2024-10-29 | End: 2024-11-28

## 2024-10-11 NOTE — TELEPHONE ENCOUNTER
Last ordered:  Norco & Percocet 09/24/24  Robaxin 08/21/24    Last seen:11/14/23  Upcoming appt:none

## 2024-10-15 ENCOUNTER — OFFICE VISIT (OUTPATIENT)
Dept: VASCULAR SURGERY | Facility: CLINIC | Age: 74
End: 2024-10-15
Attending: SURGERY
Payer: MEDICARE

## 2024-10-15 ENCOUNTER — HOSPITAL ENCOUNTER (OUTPATIENT)
Dept: VASCULAR SURGERY | Facility: CLINIC | Age: 74
Discharge: HOME | End: 2024-10-15
Attending: SURGERY
Payer: MEDICARE

## 2024-10-15 VITALS — TEMPERATURE: 98 F | SYSTOLIC BLOOD PRESSURE: 167 MMHG | HEART RATE: 64 BPM | DIASTOLIC BLOOD PRESSURE: 68 MMHG

## 2024-10-15 DIAGNOSIS — Z99.2 ESRD (END STAGE RENAL DISEASE) ON DIALYSIS: ICD-10-CM

## 2024-10-15 DIAGNOSIS — T82.898A STEAL SYNDROME AS COMPLICATION OF DIALYSIS ACCESS, INITIAL ENCOUNTER: Primary | ICD-10-CM

## 2024-10-15 DIAGNOSIS — N18.6 ESRD (END STAGE RENAL DISEASE) ON DIALYSIS: ICD-10-CM

## 2024-10-15 DIAGNOSIS — T82.590D MALFUNCTION OF ARTERIOVENOUS DIALYSIS FISTULA, SUBSEQUENT ENCOUNTER: Primary | ICD-10-CM

## 2024-10-15 DIAGNOSIS — R20.0 NUMBNESS: Primary | ICD-10-CM

## 2024-10-15 PROCEDURE — 99999 PR PBB SHADOW E&M-EST. PATIENT-LVL III: CPT | Mod: PBBFAC,HCNC,, | Performed by: SURGERY

## 2024-10-15 PROCEDURE — 93990 DOPPLER FLOW TESTING: CPT | Mod: S$GLB,,, | Performed by: SURGERY

## 2024-10-15 PROCEDURE — 93931 UPPER EXTREMITY STUDY: CPT | Mod: XS,S$GLB,, | Performed by: SURGERY

## 2024-10-15 NOTE — PROGRESS NOTES
VASCULAR SURGERY SERVICE    REFERRING DOCTOR: Lavinia Nieto NP     CHIEF COMPLAINT:  End-stage renal disease    HISTORY OF PRESENT ILLNESS: Cecile Bowen is a 74 y.o. female with prior simple mastectomy on the left, sentinel node biopsy, no axillary node dissection, with end-stage renal disease times 3 months via right IJ PermCath who is sent for permanent dialysis access.  She is currently dialyzing only Mondays and Fridays.    She is right-handed.  She has no history of AICD or pacemaker placement.  She does have known bilateral carpal tunnel syndrome with the occasional numbness in her hands but has not had a release.    She takes Eliquis 5 mg but only once a day because of easy bruising.  She is on clear why she is on Eliquis    Finally she takes chronic narcotics for back pain    S/p    1a.  Diagnostic left arm arteriogram 09/19/2024  1/ trans radial PTA, left AV fistula 04/29/2024  2. left brachiocephalic AV fistula creation 02/14/2024    3/5/2024:  This is initial follow-up after left brachiocephalic AV fistula creation 02/14/2024.  She is without complaint specifically no hand pain weakness or numbness    04/09/2024:  She now returns.  She is dialyzing on Mondays and Fridays only.  She denies any hand pain weakness or numbness    06/11/2024:  She now returns 6 weeks status post angioplasty left AV fistula.  She is still dialyzing only Mondays and Fridays via PermCath    08/13/2024:   She now returns.  She was using the fistula with a 1 and 1 with the PermCath but they have not able to accessed the fistula in the last 2 sessions.   Bilateral carpal tunnel syndrome since I was in high school but has never had surgery for this.  However in the left hand she says that her episodes of numbness have worsened in the last 2-3 weeks and occasionally has some pain.    She also notes a tiny sore at the base of her 4th digit at a nailbed    09/03/2024:  She now returns in continues use her PermCath.    Notably she has been nonambulatory for better part of a year.   Regarding her left hand she does report increased numbness for the last 6 months corresponding to after her fistula was placed.  Denies any weakness, rarely says she has some pain.  Notably she has had carpal tunnel syndrome since I was in high school we and wears a chronic hand brace.    She had tiny ulceration of the base of her left 4th digit when I saw her 2 weeks ago.  She reports this is completely healed but she has developed a new similar lesion on the left 3rd finger which he ascribes to a ingrown nail    She is on chronic Eliquis but was not sure why she was on this.  Looking back in her chart it appears that she was diagnosed with atrial fibrillation 2021 but this is not on her problem list.    10/15/2024:   She now returns after diagnostic arteriogram of the left arm to evaluate for steal.  In the interim she has also started to cannulate her AV fistula has used exclusively for 2 weeks.  She is now complaining of left hand pain at night over the last 7-10 days which was not prior before.  Notably the small ulceration she had had a nailbed have healed however.  She does have prior carpal tunnel on this side and wears a chronic brace    Past Medical History:   Diagnosis Date    Cervicogenic migraine     Chronic pain     CKD (chronic kidney disease) stage 4, GFR 15-29 ml/min     [facility]    CKD (chronic kidney disease) stage 4, GFR 15-29 ml/min     Diabetes mellitus     Long term use of Insulin, Diabetic Neuropathy    Dialysis patient 1/21/2022    Fibromyalgia     Hydronephrosis     Hyperlipidemia     Hypertension 12/12/2012    Hypothyroidism 12/12/2012    ARON (iron deficiency anemia)     Insomnia     Levoscoliosis     Malignant neoplasm of upper-outer quadrant of left breast in female, estrogen receptor positive     Metabolic acidosis     Mobility impaired     Nuclear sclerosis - Both Eyes 3/24/2014    VIJAYA (obstructive sleep apnea)      Osteopenia     Pulmonary nodule     Recurrent UTI     Renal manifestation of secondary diabetes mellitus     Secondary hyperparathyroidism, renal     Urinary retention        Past Surgical History:   Procedure Laterality Date    ANGIOGRAPHY OF UPPER EXTREMITY N/A 9/19/2024    Procedure: Angiogram Extremity Bilateral;  Surgeon: RODRIGO Friedman III, MD;  Location: [facility] OR 2ND FLR;  Service: Vascular;  Laterality: N/A;  R femoral approach, arch & arm angiogram  168.76mgy  33.1145 gycm2  8.9min    AV FISTULA PLACEMENT Left 2/14/2024    Procedure: CREATION, AV FISTULA;  Surgeon: RODRIGO Friedman III, MD;  Location: [facility] OR 2ND FLR;  Service: Vascular;  Laterality: Left;  LUE AVF creation vs AVG insertion    BIOPSY OF URETER Left 2/18/2022    Procedure: BIOPSY, URETER;  Surgeon: Ronel Whittington MD;  Location: [facility] OR 1ST FLR;  Service: Urology;  Laterality: Left;    BREAST BIOPSY Right     benign    CHOLECYSTECTOMY      COLONOSCOPY N/A 1/13/2017    Procedure: COLONOSCOPY;  Surgeon: Morris Wiseman MD;  Location: [facility] ENDO (4TH FLR);  Service: Endoscopy;  Laterality: N/A;  Renal pt Nephrology advised to avoid phosphate preps    COLONOSCOPY N/A 12/7/2023    Procedure: COLONOSCOPY;  Surgeon: Herve Allen MD;  Location: [facility] ENDO (2ND FLR);  Service: Endoscopy;  Laterality: N/A;  HD MWF; labs prior  suprapubic catheter  pt does not ambulate-uses christina lift- will have sling with her  9/7 ref. by Anastasiia Campbell DO, PEG, instr. mailed, Eliquis hold approval pending-[facility] to hold Eliquis 2 days per Dr Navarro-GT  1/30-precall complete-MS    CYSTOSCOPY N/A 10/8/2018    Procedure: CYSTOSCOPY;  Surgeon: Ronel Whittington MD;  [facility];  Service: Urology;  Laterality: N/A;  45 min    CYSTOSCOPY N/A 3/25/2019    Procedure: CYSTOSCOPY;  Surgeon: Ronel Whittington MD;  [facility];  Service: Urology;  Laterality: N/A;  45 min    CYSTOSCOPY N/A 8/26/2019    Procedure:  CYSTOSCOPY;  Surgeon: Ronel Whittington MD;  [facility];  Service: Urology;  Laterality: N/A;  45 min    CYSTOSCOPY N/A 7/2/2021    Procedure: CYSTOSCOPY;  Surgeon: Ronel Whittington MD;  Location: [facility] OR 1ST FLR;  Service: Urology;  Laterality: N/A;    CYSTOSCOPY  12/23/2021    Procedure: CYSTOSCOPY;  Surgeon: Ronel Whittington MD;  [facility];  Service: Urology;;    CYSTOSCOPY  2/18/2022    Procedure: CYSTOSCOPY;  Surgeon: Ronel Whittington MD;  [facility];  Service: Urology;;    CYSTOSCOPY W/ URETERAL STENT PLACEMENT Left 5/15/2021    Procedure: CYSTOSCOPY, WITH URETERAL STENT INSERTION;  Surgeon: Levy Sánchez Jr., MD;  Location: [facility] OR [address] [place] [address];  Service: Urology;  Laterality: Left;    CYSTOSCOPY WITH BIOPSY OF BLADDER N/A 1/27/2020    Procedure: CYSTOSCOPY, WITH BLADDER BIOPSY, WITH FULGURATION IF INDICATED;  Surgeon: Ronel Whittington MD;  Location: [facility] OR [address] [place] [address];  Service: Urology;  Laterality: N/A;    DILATION AND CURETTAGE OF UTERUS      FISTULOGRAM N/A 4/29/2024    Procedure: Fistulogram;  Surgeon: RODRIGO Friedman III, MD;  Location: [facility] CATH LAB;  Service: Peripheral Vascular;  Laterality: N/A;    GALLBLADDER SURGERY  2006    HYSTERECTOMY      INJECTION FOR SENTINEL NODE IDENTIFICATION Left 8/1/2019    Procedure: INJECTION, FOR SENTINEL NODE IDENTIFICATION;  Surgeon: Samia Fulton MD;  Location: [facility] OR 2ND [facility];  Service: General;  Laterality: Left;    INJECTION OF BOTULINUM TOXIN TYPE A N/A 10/8/2018    Procedure: INJECTION, BOTULINUM TOXIN, TYPE A 300 UNITS;  Surgeon: Ronel Whittington MD;  Location: [facility] OR [address] [place] [address];  Service: Urology;  Laterality: N/A;    INJECTION OF BOTULINUM TOXIN TYPE A N/A 3/25/2019    Procedure: INJECTION, BOTULINUM TOXIN, TYPE A 300 UNITS;  Surgeon: Ronel Whittington MD;  Location: [facility] OR 1ST FLR;  Service: Urology;  Laterality: N/A;    INJECTION OF BOTULINUM TOXIN TYPE A N/A 8/26/2019     Procedure: INJECTION, BOTULINUM TOXIN, TYPE A 300 UNITS;  Surgeon: Ronel Whittington MD;  Location: [facility] OR [address] [place] [address];  Service: Urology;  Laterality: N/A;    MASTECTOMY Left 8/1/2019    Procedure: MASTECTOMY 23 hour stay;  Surgeon: Samia Fulton MD;  Location: [facility] OR 2ND FLR;  Service: General;  Laterality: Left;    MEDIPORT REMOVAL Right 6/24/2022    Procedure: REMOVAL, CATHETER, CENTRAL VENOUS, TUNNELED, WITH PORT;  Surgeon: Isauro Anderson MD;  Location: [facility] CATH LAB;  Service: Radiology;  Laterality: Right;    OVARIAN CYST SURGERY  1985    PERCUTANEOUS TRANSLUMINAL ANGIOPLASTY OF ARTERIOVENOUS FISTULA Left 4/29/2024    Procedure: PTA, AV FISTULA;  Surgeon: RODRIGO Friedman III, MD;  Location: [facility] CATH LAB;  Service: Peripheral Vascular;  Laterality: Left;    REPLACEMENT OF STENT Left 12/23/2021    Procedure: REPLACEMENT, STENT;  Surgeon: Ronel Whittington MD;  Location: [facility] OR [address] [place] [address];  Service: Urology;  Laterality: Left;    REPLACEMENT OF STENT Left 2/18/2022    Procedure: REPLACEMENT, STENT;  Surgeon: Ronel Whittington MD;  Location: [facility] OR [address] [place] [address];  Service: Urology;  Laterality: Left;    RETROGRADE PYELOGRAPHY Left 2/18/2022    Procedure: PYELOGRAM, RETROGRADE;  Surgeon: Ronel Whittington MD;  Location: [facility] OR [address] [place] [address];  Service: Urology;  Laterality: Left;    SENTINEL LYMPH NODE BIOPSY Left 8/1/2019    Procedure: BIOPSY, LYMPH NODE, SENTINEL;  Surgeon: Samia Fulton MD;  Location: [facility] OR [address] [place] [address];  Service: General;  Laterality: Left;    spt placement      TONSILLECTOMY, ADENOIDECTOMY      TOTAL ABDOMINAL HYSTERECTOMY W/ BILATERAL SALPINGOOPHORECTOMY  1985    URETEROSCOPY Left 2/18/2022    Procedure: URETEROSCOPY;  Surgeon: Ronel Whittington MD;  Location: [facility] OR [address] [place] [address];  Service: Urology;  Laterality: Left;         Current Outpatient Medications:     acetaminophen (TYLENOL) 325 MG tablet, Take 2 tablets (650 mg total) by mouth every 6 (six) hours as needed for Pain.,  "Disp: , Rfl:     amLODIPine (NORVASC) 10 MG tablet, Take 10 mg by mouth., Disp: , Rfl:     anastrozole (ARIMIDEX) 1 mg Tab, TAKE 1 TABLET BY MOUTH EVERY DAY, Disp: 90 tablet, Rfl: 2    apixaban (ELIQUIS) 5 mg Tab, Take 1 tablet (5 mg total) by mouth Daily., Disp: , Rfl:     atorvastatin (LIPITOR) 80 MG tablet, Take 1 tablet (80 mg total) by mouth once daily., Disp: 90 tablet, Rfl: 3    BD INSULIN SYRINGE ULTRA-FINE 0.5 mL 31 gauge x 5/16" Syrg, USE WITH INSULIN 4 TIMES A DAY, Disp: 100 each, Rfl: 12    calcium acetate,phosphat bind, (PHOSLO) 667 mg capsule, Take 667 mg by mouth 3 (three) times daily with meals., Disp: , Rfl:     carvediloL (COREG) 12.5 MG tablet, Take 1 tablet (12.5 mg total) by mouth 2 (two) times daily., Disp: 180 tablet, Rfl: 3    diclofenac sodium (VOLTAREN) 1 % Gel, Apply 2 g topically once daily., Disp: , Rfl:     DULoxetine (CYMBALTA) 20 MG capsule, Take 2 capsules (40 mg total) by mouth once daily., Disp: 180 capsule, Rfl: 1    erenumab-aooe (AIMOVIG AUTOINJECTOR) 140 mg/mL autoinjector, 140 mg MONTHLY (route: subcutaneous), Disp: 1 each, Rfl: 11    ergocalciferol (ERGOCALCIFEROL) 50,000 unit Cap, Take 1 capsule (50,000 Units total) by mouth every 7 days., Disp: 12 capsule, Rfl: 3    [START ON 10/24/2024] HYDROcodone-acetaminophen (NORCO)  mg per tablet, Take 1 tablet by mouth every 6 (six) hours as needed for Pain., Disp: 120 tablet, Rfl: 0    LANTUS SOLOSTAR U-100 INSULIN 100 unit/mL (3 mL) InPn pen, INJECT 12-15 UNITS UNDER THE SKIN at night, Disp: 15 mL, Rfl: 3    losartan (COZAAR) 100 MG tablet, Take 1 tablet (100 mg total) by mouth once daily., Disp: 90 tablet, Rfl: 3    methocarbamoL (ROBAXIN) 750 MG Tab, TAKE 1-2 TABLETS (750-1,500 MG TOTAL) BY MOUTH 3 (THREE) TIMES DAILY AS NEEDED (MUSCLE SPASMS)., Disp: 180 tablet, Rfl: 1    ondansetron (ZOFRAN-ODT) 8 MG TbDL, 8 mg EVERY 12 HOURS (route: oral), Disp: , Rfl:     oxybutynin (DITROPAN-XL) 10 MG 24 hr tablet, TAKE 1 TABLET BY " "MOUTH EVERY DAY, Disp: 90 tablet, Rfl: 4    [START ON 10/29/2024] oxyCODONE (ROXICODONE) 5 MG immediate release tablet, Take 1 tablet (5 mg total) by mouth daily as needed (severe pain)., Disp: 30 tablet, Rfl: 0    pen needle, diabetic (BD ULTRA-FINE SHORT PEN NEEDLE) 31 gauge x 5/16" Ndle, USE WITH LANTUS DAILY, e 11.65, Disp: 100 each, Rfl: 3    sodium bicarbonate 650 MG tablet, Take 1 tablet (650 mg total) by mouth once daily., Disp: 30 tablet, Rfl: 11    sumatriptan (IMITREX) 100 MG tablet, Take 1 tablet (100 mg total) by mouth every 2 (two) hours as needed for Migraine (Limit 2 in 14 hours and 9 in one month)., Disp: 30 tablet, Rfl: 1    SYNTHROID 50 mcg tablet, TAKE 1 TABLET BY MOUTH BEFORE BREAKFAST. ADMINISTER ON AN EMPTY STOMACH AT LEAST 30 MINUTES BEFORE FOOD. IF RECEIVING TUBE FEEDS, HOLD TUBE FEEDS FOR 1 HOUR BEFORE AND AFTER LEVOTHYROXINE ADMINISTRATION., Disp: 90 tablet, Rfl: 2    traZODone (DESYREL) 100 MG tablet, TAKE 1 TABLET BY MOUTH NIGHTLY AS NEEDED FOR INSOMNIA., Disp: 90 tablet, Rfl: 2    UNABLE TO FIND, medication name: Tylenol Migraine- APAP, ASA, caffeine, Disp: , Rfl:     VELPHORO 500 mg Chew, CHEW INSERT TABLET 5 TIMES DAILY WITH MEAL AND SNACK, Disp: , Rfl:     furosemide (LASIX) 40 MG tablet, Take 1 tablet (40 mg total) by mouth once daily., Disp: 30 tablet, Rfl: 11    miconazole NITRATE 2 % (MICOTIN) 2 % top powder, Apply topically 2 (two) times daily., Disp: 85 g, Rfl: 0  No current facility-administered medications for this visit.    Facility-Administered Medications Ordered in Other Visits:     epoetin chloe injection 2,000 Units, 2,000 Units, Intravenous, 1 time in Clinic/HOD, Emmanuel Hare Jr., MD    heparin (porcine) injection 2,000 Units, 2,000 Units, Intravenous, Once, Emmanuel Hare Jr., MD    heparin (porcine) injection 2,000 Units, 2,000 Units, Intravenous, Once, Emmanuel Hare Jr., MD    heparin (porcine) injection 4,000 Units, 4,000 Units, Intra-Catheter, " 1 time in Clinic/HOD, Emmanuel Hare Jr., MD    iron sucrose injection 100 mg, 100 mg, Intravenous, 1 time in Clinic/HOD, Emmanuel Hare Jr., MD    Review of patient's allergies indicates:  No Known Allergies    Family History   Problem Relation Name Age of Onset    Diabetes Sister Kat     Kidney disease Sister Kat         CKD III    ALS Mother          d.    Cancer Maternal Grandmother          d. colon    Cancer Paternal Grandfather          d. lung    Scoliosis Brother [place]         increased pain    Prostate cancer Brother [place]         cured s/p surgery    Cancer Brother [place]         rare cancer that got into the bones    Diabetes Maternal Aunt      Kidney disease Maternal Aunt      Diabetes Maternal Uncle      Amblyopia Neg Hx      Blindness Neg Hx      Cataracts Neg Hx      Glaucoma Neg Hx      Macular degeneration Neg Hx      Retinal detachment Neg Hx      Strabismus Neg Hx         Social History     Tobacco Use    Smoking status: Never    Smokeless tobacco: Never   Substance Use Topics    Alcohol use: No     Alcohol/week: 0.0 standard drinks of alcohol    Drug use: No     PHYSICAL EXAM:   BP (!) 167/68   Pulse 64   Temp 98.2 °F (36.8 °C) (Oral)   Constitutional:  Alert,   Well-appearing  In no distress.  Traveling by wheelchair   Neurological: Normal speech  no focal findings  CN II - XII grossly intact.    Psychiatric: Mood and affect appropriate and symmetric.   HEENT: Normocephalic / atraumatic  PERRLA  Midline trachea  No scars across the neck   Cardiac: Regular rate and rhythm.   Pulmonary: Normal pulmonary effort.   Abdomen: Soft, not distended.     Skin: Warm and well perfused.    Vascular:  Radial pulse on the left today is trace 1+ palpable, although it clearly stronger with fistula compression   Extremities/  Musculoskeletal: Left arm:  Left brachiocephalic AV fistula has significant excellent thrill with no significant pulsatility.  Very modest ecchymosis    Left hand 5/5   strength.  Subjectively hand and digits are cooler than the right   08/13/2024      IMAGING:  Left finger PPG is are completely flat in all digits.  This does improve somewhat with fistula compression  Duplex of the fistula demonstrates no stenosis, flow volume of 1.9 L  Diameter 8.9 proximal, 8.3 mid, 7.6 distal  Depth 4.1 proximal, 5.9 minute, 6.5 distal      IMPRESSION:   Worsening left arm vascular steal.   Patient has a flow volume of 1.9 L in this fistula.  3.  Narcotic dependence for chronic back pain.  4.  Nonambulatory status x1 year  5.  On chronic Eliquis, evidently for atrial fibrillation diagnosed in 2021.  By clinical exam she does not feel that she is in AFib.  She was unaware of why she is on the Eliquis       PLAN:   Banding/revision, left AV fistula, 10/24/2024, 7:00 a.m. start  Regional block  Hold Eliquis for 2 days prior    I have explained the risks, benefits and alternatives for this procedure in detail.  The patient voices understanding and all questions have be answered, and agrees to proceed with the procedure.     WALE Friedman III, MD, FACS  Professor and Chief, Vascular and Endovascular Surgery

## 2024-10-16 DIAGNOSIS — C50.612 MALIGNANT NEOPLASM OF AXILLARY TAIL OF LEFT BREAST IN FEMALE, ESTROGEN RECEPTOR POSITIVE: Chronic | ICD-10-CM

## 2024-10-16 DIAGNOSIS — Z17.0 MALIGNANT NEOPLASM OF AXILLARY TAIL OF LEFT BREAST IN FEMALE, ESTROGEN RECEPTOR POSITIVE: Chronic | ICD-10-CM

## 2024-10-16 RX ORDER — ANASTROZOLE 1 MG/1
1 TABLET ORAL
Qty: 90 TABLET | Refills: 2 | Status: SHIPPED | OUTPATIENT
Start: 2024-10-16

## 2024-10-23 ENCOUNTER — TELEPHONE (OUTPATIENT)
Dept: VASCULAR SURGERY | Facility: CLINIC | Age: 74
End: 2024-10-23
Payer: MEDICARE

## 2024-10-23 NOTE — TELEPHONE ENCOUNTER
Pt states her last dose of Eliquis was taken on Monday 10/21/24 in preparation for surgery with Dr. Friedman on Thursday 10/24/24.

## 2024-10-28 ENCOUNTER — TELEPHONE (OUTPATIENT)
Dept: NEPHROLOGY | Facility: CLINIC | Age: 74
End: 2024-10-28
Payer: MEDICARE

## 2024-11-04 ENCOUNTER — TELEPHONE (OUTPATIENT)
Dept: VASCULAR SURGERY | Facility: CLINIC | Age: 74
End: 2024-11-04
Payer: MEDICARE

## 2024-11-04 NOTE — TELEPHONE ENCOUNTER
Received call from Neema at [facility] dialysis center stating pt did not report for AVF revision with Dr. Friedman on 10/24/24 as scheduled and is requesting case be rescheduled. Notified Neema that message will be sent to Dr. Friedman to discuss new date.

## 2024-11-05 NOTE — PROGRESS NOTES
Outpatient Care Management  Plan of Care Follow Up Visit    Patient: Cecile Bowen  MRN: [id number]  Date of Service: 02/27/2020  Completed by: Ezequiel Cornejo RN  Referral Date: 09/25/2019  Program: Disease Management (Diabetes & COPD)    Reason for Visit   Patient presents with    Update Plan Of Care     2/27/2020       Brief Summary: Contacted patient for F/U.  Patient reported that she has missed several appts recently due to grieving the loss of her brother. Stated that this weekend she and her sister will be taking his ashes to their final resting place. Patient reported that for about one week her BG readings ran in the 300s, but have since improved to 100s, reported that this mornings reading was 120. Patient that she has all of her medications and supplies and recently received notification that she was approved for a ashanti with her [employer]. Patient reported struggling with preparing meals since the loss of her brother, but stated that they have been been buying already prepared foods from "[employer] and [employer] which has made things easier. Patient reported that she did miss her appt for Botox and plans to reschedule that sometime next week. Patient appreciative of outreach. Encouraged to contact with needs. Patient aware of upcoming appts, has an appt with Dr. López in [place] Next Wednesday, will visit with patient at that time prior to appt.     Next Steps:   F/U with face to face visit in the [facility] [facility] on March 4th as discussed  Continue education on Hypertension and CKD.   Collaborate with [facility]MS[facility] to assist with needs       Patient Summary     Involvement of Care:  Do I have permission to speak with other family members about your care?       Patient Reported Labs & Vitals:  1.  Any Patient Reported Labs & Vitals?     2.  Patient Reported Blood Pressure:     3.  Patient Reported Pulse:     4.  Patient Reported Weight (Kg):     5.  Patient Reported Blood Glucose (mg/dl):   100s 120    Medical  History:  Reviewed medical history with patient and/or caregiver    Social History:  Reviewed social history with patient and/or caregiver    Clinical Assessment     Reviewed and provided basic information on available community resources for mental health, transportation, wellness resources, and palliative care programs with patient and/or caregiver.    Complex Care Plan     Care plan was discussed and completed today with input from patient and/or caregiver.    Goals      Patient/caregiver will accept life style changes to manage and improve CKD prior to discharge from OPCM. - Priority: High      Overall Time to Completion  3 months from 10/15/2019     OPCM Identified Patient Barriers:  Advanced Care Planning:  No  Housing:  no  Lab Adherence:  no  Cognitive/Behavioral Health:  no  Communication:  no  Health Literacy:  no  Equipment/Supplies/Services:  yes  Culural/Latter day Beliefs:  no  Fall Risk:  Neg     OPCM Identified Disease Education Barriers:  Hypertension:  Pos  Chronic Kidney Disease:  Pos       Short Term Goals    Patient/caregiver will verbalize current eGFR value and eGFR goal within 1 month.  Interventions   · Review current eGFR with patient 27.5. Done 2/3/20  · Review eGFR goal with patient, >60. Done 2/3/20    10/29/19-Patient/Caregiver agrees to review resource material on CKD by  Next encounter. (2/3/2020-Reviewed)  Patient/Caregiver agrees to OPCM follow up within 2 weeks to assess progress to goal.      Status  · Met      Patient/caregiver will verbalize 2 signs and symptoms of Kidney Disease that would necessitate a call to healthcare provider or 911 within 2 months.    Signs and Symptoms of Kidney Disease   Call your healthcare provider right away if any of these occur:   Nausea or vomiting   Fever of 100.4°F (38°C) or higher, or as directed by your healthcare provider   Unexpected weight gain or swelling in the legs, ankles, or around the eyes   Decrease or absent urine output   Call 911  if you have any of the following:   Severe weakness, dizziness, fainting, drowsiness, or confusion   Chest pain or shortness of breath   Heart beating fast, slow, or irregularly    Interventions   · Assess for retention of at least 3 signs and symptoms of Kidney Disease that would necessitate a call to healthcare provider or 911.  · Recognize and provide educational material (KRAMES) on Chronic Kidney Disease. - Mailed  · Review with patient/caregiver the signs and symptoms of Kidney Disease that would necessitate a call to healthcare provider or 911.    10/29/19-Patient/Caregiver agrees to recognize 2 s/sx of kidney disease that need a call to her HCP or 911 by End of enrollment.  Patient/Caregiver agrees to OPCM follow up within 2 weeks to assess progress to goal.  (12/19-in progress)      Status  · Partially met    Patient/caregiver will verbalize 3 ways of preventing complications due to Kidney Disease within 2 months.   Ways of Preventing Complications of Kidney Disease  If you have diabetes, talk with your healthcare provider about keeping your blood sugar under control. Ask if you need to make and changes to your diet, lifestyle, or medicines.   If you have high blood pressure:  ? Take prescribed medicine to lower your blood pressure to the recommended goal of less than 130/80.  ? Start a regular exercise program that you enjoy. Check with your healthcare provider to be sure your planned exercise program is right for you.  ? Eat less salt (sodium). Your healthcare provider can tell you how much salt per day is safe for you.  If you are overweight, talk with your healthcare provider about a weight loss plan.  Most people with CKD need to follow a special diet.  Be sure you understand yours. In general, you will need to limit protein, salt, potassium, and phosphorus. You also need to limit how much fluid you drink.   CKD is a risk factor for heart disease. Talk with your healthcare provider about any other risk  factors you might have and what you can do to lessen them.  Talk with your healthcare provider about any medicines you are taking to find out if they need to be reduced or stopped.  Don't use the following over-the-counter medicines, or consult your healthcare provider before using:  ? Aspirin and NSAIDs such as ibuprofen or naproxen. Using acetaminophen for fever or pain is OK.  ? Laxatives and antacids containing magnesium or aluminum  ? Fleet or phospho soda enemas containing phosphorus  ? Certain stomach acid-blocking medicine such as cimetidine or ranitidine   ? Decongestants containing pseudoephedrine   ? Herbal supplements    Interventions   · Assess for retention of at least 3 ways of preventing complications due to Kidney Disease.    · Empower patient/caregiver to discuss treatment plan with Physician/care team. Ongoing  · Educate on importance of compliance with annual vaccinations. Done 10/15  · Educate on importance of compliance with Physician follow-ups. Ongoing  · Educate on importance of compliance with preventive screenings. Ongoing  · Educate on importance of compliance with scheduled laboratory testing and procedure ongoing.  · Educate on importance of Dietary Compliance. Ongoing  · Educate on importance of Medication Compliance. Ongoing  · Recognize and provide educational material (KRAMES) on Chronic Kidney Disease. - Mailed  · Review with patient/caregiver ways of prevention complications due to Kidney Disease.       Status  · Partially met               Clinical Reference Documents Added to Patient Instructions       Document    CHRONIC KIDNEY DISEASE (CKD) (ENGLISH)    CHRONIC KIDNEY DISEASE, DIET FOR (ENGLISH)    HEART-HEALTHY FOOD, EATING: USING THE DASH PLAN (ENGLISH)    HIGH BLOOD PRESSURE, CONTROLLING (ENGLISH)    KIDNEY DISEASE, ANEMIA AND (ENGLISH)    KIDNEY DISEASE, HIGH BLOOD PRESSURE AND (ENGLISH)    STROKE AND HIGH BLOOD PRESSURE, UNDERSTANDING THE LINK BETWEEN (ENGLISH)              Patient/caregiver will have knowledge of resources available in order to obtain the services that are needed prior to discharge from OPCM. - Priority: High      Overall Time to Completion  2 months from 10/15/2019     OPCM Identified Patient Barriers:      OPC Identified Education Barriers:  Education Barrier for Hypertension--Care Plan Created  Education Barrier for CKD--Care Plan Created      Short Term Goals   Patient/caregiver will fill out any applications that were sent to patient to complete from OPCM  within 1 month.  Interventions   · Refer to Outpatient Case Management Social Worker. Done 10/14, 10/29-pending outreach     Status  · Partially met      Patient/caregiver will have contact information for identified community resources IE:OPCM  for follow-up within 1 month.  Interventions   · Refer to Outpatient Case Management Social Worker. Done 10/14, 10/29-pending outreach     Status  · Partially met      Patient/caregiver will notify RN CCM if patient does not hear from OPCM  within 2 weeks.  Interventions   · Refer to Outpatient Case Management Social Worker. Done 10/14, 10/29-pending outreach     Status  · Partially met                Patient Instructions     Instructions were provided via the [employer] patient Dittit and are available for the patient to view on the patient portal.      Follow up in about 6 days (around 3/4/2020) for RN Follow UP.      Todays [facility] Self-Management Care Plan was developed with the patients/caregivers input and was based on identified barriers from todays assessment.  Goals were written today with the patient/caregiver and the patient has agreed to work towards these goals to improve his/her overall well-being. Patient verbalized understanding of the care plan, goals, and all of today's instructions. Encouraged patient/caregiver to communicate with his/her physician and health care team about health conditions and the treatment  Include Location In Plan?: No plan.  Provided my contact information today and encouraged patient/caregiver to call me with any questions as needed.   Detail Level: Zone

## 2024-11-06 ENCOUNTER — OFFICE VISIT (OUTPATIENT)
Dept: INTERNAL MEDICINE | Facility: CLINIC | Age: 74
End: 2024-11-06
Payer: MEDICARE

## 2024-11-06 DIAGNOSIS — M79.7 FIBROMYALGIA: Chronic | ICD-10-CM

## 2024-11-06 DIAGNOSIS — D50.8 OTHER IRON DEFICIENCY ANEMIA: ICD-10-CM

## 2024-11-06 DIAGNOSIS — I70.0 AORTIC ATHEROSCLEROSIS: ICD-10-CM

## 2024-11-06 DIAGNOSIS — I10 HYPERTENSION, UNSPECIFIED TYPE: ICD-10-CM

## 2024-11-06 DIAGNOSIS — Z99.2 TYPE 2 DIABETES MELLITUS WITH CHRONIC KIDNEY DISEASE ON CHRONIC DIALYSIS, WITH LONG-TERM CURRENT USE OF INSULIN: Primary | ICD-10-CM

## 2024-11-06 DIAGNOSIS — E66.811 CLASS 1 OBESITY DUE TO EXCESS CALORIES WITH SERIOUS COMORBIDITY AND BODY MASS INDEX (BMI) OF 33.0 TO 33.9 IN ADULT: ICD-10-CM

## 2024-11-06 DIAGNOSIS — N18.6 TYPE 2 DIABETES MELLITUS WITH CHRONIC KIDNEY DISEASE ON CHRONIC DIALYSIS, WITH LONG-TERM CURRENT USE OF INSULIN: Primary | ICD-10-CM

## 2024-11-06 DIAGNOSIS — E11.69 HYPERLIPIDEMIA ASSOCIATED WITH TYPE 2 DIABETES MELLITUS: Chronic | ICD-10-CM

## 2024-11-06 DIAGNOSIS — Z99.2 ESRD (END STAGE RENAL DISEASE) ON DIALYSIS: ICD-10-CM

## 2024-11-06 DIAGNOSIS — E11.22 TYPE 2 DIABETES MELLITUS WITH CHRONIC KIDNEY DISEASE ON CHRONIC DIALYSIS, WITH LONG-TERM CURRENT USE OF INSULIN: Primary | ICD-10-CM

## 2024-11-06 DIAGNOSIS — E55.9 VITAMIN D DEFICIENCY DISEASE: ICD-10-CM

## 2024-11-06 DIAGNOSIS — N18.6 ESRD (END STAGE RENAL DISEASE) ON DIALYSIS: ICD-10-CM

## 2024-11-06 DIAGNOSIS — Z79.4 TYPE 2 DIABETES MELLITUS WITH CHRONIC KIDNEY DISEASE ON CHRONIC DIALYSIS, WITH LONG-TERM CURRENT USE OF INSULIN: Primary | ICD-10-CM

## 2024-11-06 DIAGNOSIS — E78.5 HYPERLIPIDEMIA ASSOCIATED WITH TYPE 2 DIABETES MELLITUS: Chronic | ICD-10-CM

## 2024-11-06 DIAGNOSIS — E66.09 CLASS 1 OBESITY DUE TO EXCESS CALORIES WITH SERIOUS COMORBIDITY AND BODY MASS INDEX (BMI) OF 33.0 TO 33.9 IN ADULT: ICD-10-CM

## 2024-11-06 DIAGNOSIS — E03.9 ACQUIRED HYPOTHYROIDISM: Chronic | ICD-10-CM

## 2024-11-06 DIAGNOSIS — F33.1 MAJOR DEPRESSIVE DISORDER, RECURRENT, MODERATE: ICD-10-CM

## 2024-11-06 DIAGNOSIS — K74.00 LIVER FIBROSIS: ICD-10-CM

## 2024-11-07 NOTE — PROGRESS NOTES
Established Patient - Audio Only Telehealth Visit     The patient location is: home  The chief complaint leading to consultation is: type 2 dm   Visit type: Virtual visit with audio only (telephone)  Total time spent with patient: 15 mins  Spoke with pt via [employer].    Using nadya 3  [employer] medical supplier    Novolog -not using   Lantus 10 units if BG > 150 mg/dl  HD x 1 year now, MWF     On insulin , 1 x a day  Testing >4 x a day, nadya continuous  Patient is willing and able to use the device  Demonstrated an understanding of the technology and is motivated to use CGM  Patient expected to adhere to a comprehensive diabetes treatment plan and patient has adequate medical supervision  Patient experiences multiple impaired    10/22 100 mg/dl  118 mg/dl  10/24, 65  107  122   108   99  104  96  112  104  95  106       The reason for the audio only service rather than synchronous audio and video virtual visit was related to technical difficulties or patient preference/necessity.     Each patient to whom I provide medical services by telemedicine is:  (1) informed of the relationship between the physician and patient and the respective role of any other health care provider with respect to management of the patient; and (2) notified that they may decline to receive medical services by telemedicine and may withdraw from such care at any time. Patient verbally consented to receive this service via voice-only telephone call.       HPI: type 2 dm     Assessment and plan:    1. Type 2 diabetes mellitus with chronic kidney disease on chronic dialysis, with long-term current use of insulin  Hemoglobin [id number] standing order  A1c goal less than 7.5%  Continue regimen above  Continue use of nadya 3   F/u with dialysis, nephrology      2. Acquired hypothyroidism  Lab Results   Component Value Date    TSH 1.113 06/13/2024     At goal  On l-t4  Managed per pcp       3. Fibromyalgia  F/u with rheumatology, stable      4. Vitamin D  deficiency disease  On ergo, stable      5. Hyperlipidemia associated with type 2 diabetes mellitus  Lab Results   Component Value Date    LDLCALC 85 04/22/2024     At goal      6. Other iron deficiency anemia  May skew A1c, lowering affect      7. Major depressive disorder, recurrent, moderate  On cymbalta   stable      8. Class 1 obesity due to excess calories with serious comorbidity and body mass index (BMI) of 33.0 to 33.9 in adult  May increase insulin resistance  Encourage exercise 3-5 x a day      9. ESRD (end stage renal disease) on dialysis  See above      10. Aortic atherosclerosis  On statin  F/u with cards        11. Liver fibrosis  F/u with hepalology  stable      12. Hypertension, unspecified type  On arb, stable                                This service was not originating from a related E/M service provided within the previous 7 days nor will  to an E/M service or procedure within the next 24 hours or my soonest available appointment.  Prevailing standard of care was able to be met in this audio-only visit.

## 2024-11-11 ENCOUNTER — EXTERNAL HOME HEALTH (OUTPATIENT)
Dept: HOME HEALTH SERVICES | Facility: HOSPITAL | Age: 74
End: 2024-11-11
Payer: MEDICARE

## 2024-11-12 ENCOUNTER — TELEPHONE (OUTPATIENT)
Dept: INTERNAL MEDICINE | Facility: CLINIC | Age: 74
End: 2024-11-12
Payer: MEDICARE

## 2024-11-12 DIAGNOSIS — L89.92 PRESSURE INJURY, STAGE 2, UNSPECIFIED LOCATION: Primary | ICD-10-CM

## 2024-11-12 NOTE — TELEPHONE ENCOUNTER
Can HH send photos of pressure sores or describe them so I can decide what we should be doing...     Typically if the skin is just red without open sores she can just change position often and apply barrier cream to prevent moisture from causing skin breakdown.     If the wound is open this would change... also depending on where on her buttocks will make a difference. Is the sacrum/near her anus or is it laterally closer to her hip...     It may be easier to have the HH nurse call me directly.

## 2024-11-12 NOTE — TELEPHONE ENCOUNTER
----- Message from Maikel sent at 11/12/2024  1:57 PM CST -----  Regarding: Homehealth Nurse  Contact:  Nurse Annie [phone]  [email or web address]     Patient is calling for Medical Advice regarding: Homehealth nurse called to report patient has pressure sore on her buttocks. She is requested wound care orders for the pressure sore.    How long has patient had these symptoms:    Pharmacy name and phone#:    Patient wants a call back or thru [facility]: Call    Comments:    Please advise patient replies from provider may take up to 48 hours.

## 2024-11-12 NOTE — TELEPHONE ENCOUNTER
Please advise Home Health nurse requesting wound care orders for pressure sores located on pt buttocks

## 2024-11-12 NOTE — TELEPHONE ENCOUNTER
Called and lvm in regards to wound care to pt. Can't get in contact with phone number left for home health nurse     Pt home health nurse could send picture or described how pressure sores look for provider.

## 2024-11-14 RX ORDER — SILVER SULFADIAZINE 10 G/1000G
CREAM TOPICAL
Qty: 20 G | Refills: 0 | Status: SHIPPED | OUTPATIENT
Start: 2024-11-14

## 2024-11-14 NOTE — TELEPHONE ENCOUNTER
Stella Bateman, Lurdes Pimentel MD  Hi!  Mrs. Bowen has developed a stage 2 to her bottom.   Would you guys mind faxing woundcare orders to [facility] at [phone]?  Thanks!    --------------------------------------------------------------------------------------    If the wound is producing no to little exudate and does not have any signs of necrotic tissue or infection we can use a hydrocolloid dressing with silver alginate. Change every 3 days or as needed for rolling edges/saturation of dressing. Redistribution is imperative to prevent further breakdown.

## 2024-11-15 ENCOUNTER — TELEPHONE (OUTPATIENT)
Dept: INTERNAL MEDICINE | Facility: CLINIC | Age: 74
End: 2024-11-15
Payer: MEDICARE

## 2024-11-15 NOTE — TELEPHONE ENCOUNTER
Called [facility]  [phone] left message with staff to have on call nurse contact our office for wound care orders given by NP Catherine Mccartney whose covering for Dr. Cancino at this time.       Wound care orders for stage 2 pressure sore are as follows:   If the wound is producing no to little exudate and does not have any signs of necrotic tissue or infection we can use a hydrocolloid dressing with silver alginate. Change every 3 days or as needed for rolling edges/saturation of dressing. Redistribution is imperative to prevent further breakdown.

## 2024-11-19 ENCOUNTER — OFFICE VISIT (OUTPATIENT)
Dept: OPTOMETRY | Facility: CLINIC | Age: 74
End: 2024-11-19
Payer: COMMERCIAL

## 2024-11-19 DIAGNOSIS — Z13.5 GLAUCOMA SCREENING: ICD-10-CM

## 2024-11-19 DIAGNOSIS — H52.4 HYPEROPIA WITH PRESBYOPIA OF BOTH EYES: ICD-10-CM

## 2024-11-19 DIAGNOSIS — E11.9 TYPE 2 DIABETES MELLITUS WITHOUT RETINOPATHY: Primary | ICD-10-CM

## 2024-11-19 DIAGNOSIS — H25.13 NUCLEAR SCLEROSIS, BILATERAL: ICD-10-CM

## 2024-11-19 DIAGNOSIS — H52.03 HYPEROPIA WITH PRESBYOPIA OF BOTH EYES: ICD-10-CM

## 2024-11-19 PROCEDURE — 92014 COMPRE OPH EXAM EST PT 1/>: CPT | Mod: S$GLB,,, | Performed by: OPTOMETRIST

## 2024-11-19 PROCEDURE — 92015 DETERMINE REFRACTIVE STATE: CPT | Mod: S$GLB,,, | Performed by: OPTOMETRIST

## 2024-11-19 PROCEDURE — 99999 PR PBB SHADOW E&M-EST. PATIENT-LVL II: CPT | Mod: PBBFAC,,, | Performed by: OPTOMETRIST

## 2024-11-19 NOTE — PROGRESS NOTES
HPI    73 Y/o female is here for routine eye exam with C/o pt states she is   having trouble seeing at a distance and up close with glasses on, pt wears   otc readers for distance and otc glasses for near.  Pt denies pain and discomfort   No f/f    Eye med:   Last edited by Saskia Lyles MA on 11/19/2024  9:10 AM.            Assessment /Plan     For exam results, see Encounter Report.    Type 2 diabetes mellitus without retinopathy    Nuclear sclerosis, bilateral    Glaucoma screening    Hyperopia with presbyopia of both eyes      1. Cat OU -- pt wishes to wait on surgery.  .  Ok to cont w  otc +2.25 for dist, +5.00 for near, or I wrote bifocal Rx  2. DM- WITHOUT RETINOPATHY.  Advised yearly DFE       PLAN:    rtc 1 yr

## 2024-11-21 ENCOUNTER — TELEPHONE (OUTPATIENT)
Dept: INTERNAL MEDICINE | Facility: CLINIC | Age: 74
End: 2024-11-21
Payer: MEDICARE

## 2024-11-21 NOTE — TELEPHONE ENCOUNTER
Called [facility]  [phone] left message for on call nurse to contact our office regarding wound care orders to give a verbal.     Also faxed orders to [phone]

## 2024-11-27 ENCOUNTER — PATIENT MESSAGE (OUTPATIENT)
Dept: PHYSICAL MEDICINE AND REHAB | Facility: CLINIC | Age: 74
End: 2024-11-27
Payer: MEDICARE

## 2024-11-27 ENCOUNTER — TELEPHONE (OUTPATIENT)
Dept: VASCULAR SURGERY | Facility: CLINIC | Age: 74
End: 2024-11-27
Payer: MEDICARE

## 2024-11-27 DIAGNOSIS — G89.29 CHRONIC MIDLINE LOW BACK PAIN WITHOUT SCIATICA: ICD-10-CM

## 2024-11-27 DIAGNOSIS — M79.7 FIBROMYALGIA: ICD-10-CM

## 2024-11-27 DIAGNOSIS — M54.50 CHRONIC MIDLINE LOW BACK PAIN WITHOUT SCIATICA: ICD-10-CM

## 2024-11-27 DIAGNOSIS — G89.29 CHRONIC NECK PAIN: ICD-10-CM

## 2024-11-27 DIAGNOSIS — M54.2 CHRONIC NECK PAIN: ICD-10-CM

## 2024-11-27 NOTE — TELEPHONE ENCOUNTER
Per Dr. Friedman's request nurse contacted pt to schedule FU appt. Appointment scheduled, pt verified.

## 2024-11-29 DIAGNOSIS — M54.2 CHRONIC NECK PAIN: ICD-10-CM

## 2024-11-29 DIAGNOSIS — M79.7 FIBROMYALGIA: ICD-10-CM

## 2024-11-29 DIAGNOSIS — G89.29 CHRONIC NECK PAIN: ICD-10-CM

## 2024-11-29 DIAGNOSIS — G89.29 CHRONIC MIDLINE LOW BACK PAIN WITHOUT SCIATICA: Primary | ICD-10-CM

## 2024-11-29 DIAGNOSIS — G89.29 CHRONIC MIDLINE LOW BACK PAIN WITHOUT SCIATICA: ICD-10-CM

## 2024-11-29 DIAGNOSIS — M54.50 CHRONIC MIDLINE LOW BACK PAIN WITHOUT SCIATICA: Primary | ICD-10-CM

## 2024-11-29 DIAGNOSIS — M54.50 CHRONIC MIDLINE LOW BACK PAIN WITHOUT SCIATICA: ICD-10-CM

## 2024-11-29 RX ORDER — HYDROCODONE BITARTRATE AND ACETAMINOPHEN 10; 325 MG/1; MG/1
1 TABLET ORAL EVERY 6 HOURS PRN
Qty: 120 TABLET | Refills: 0 | Status: SHIPPED | OUTPATIENT
Start: 2024-11-29 | End: 2024-12-29

## 2024-11-29 RX ORDER — METHOCARBAMOL 750 MG/1
TABLET, FILM COATED ORAL
Qty: 180 TABLET | Refills: 1 | OUTPATIENT
Start: 2024-11-29

## 2024-11-29 RX ORDER — METHOCARBAMOL 750 MG/1
TABLET, FILM COATED ORAL
Qty: 180 TABLET | Refills: 1 | Status: SHIPPED | OUTPATIENT
Start: 2024-11-29

## 2024-11-29 NOTE — TELEPHONE ENCOUNTER
Refill request for methocarbamol 750ml. No upcoming appointments with Dr. Stafford currently scheduled.

## 2024-12-03 ENCOUNTER — OFFICE VISIT (OUTPATIENT)
Dept: VASCULAR SURGERY | Facility: CLINIC | Age: 74
End: 2024-12-03
Attending: SURGERY
Payer: MEDICARE

## 2024-12-03 ENCOUNTER — HOSPITAL ENCOUNTER (OUTPATIENT)
Dept: VASCULAR SURGERY | Facility: CLINIC | Age: 74
Discharge: HOME | End: 2024-12-03
Attending: SURGERY
Payer: MEDICARE

## 2024-12-03 VITALS — SYSTOLIC BLOOD PRESSURE: 123 MMHG | HEART RATE: 65 BPM | DIASTOLIC BLOOD PRESSURE: 58 MMHG | TEMPERATURE: 98 F

## 2024-12-03 DIAGNOSIS — Z99.2 ESRD (END STAGE RENAL DISEASE) ON DIALYSIS: ICD-10-CM

## 2024-12-03 DIAGNOSIS — N18.6 ESRD (END STAGE RENAL DISEASE) ON DIALYSIS: Primary | ICD-10-CM

## 2024-12-03 DIAGNOSIS — Z99.2 ESRD (END STAGE RENAL DISEASE) ON DIALYSIS: Primary | ICD-10-CM

## 2024-12-03 DIAGNOSIS — N18.6 ESRD (END STAGE RENAL DISEASE) ON DIALYSIS: ICD-10-CM

## 2024-12-03 PROCEDURE — 4010F ACE/ARB THERAPY RXD/TAKEN: CPT | Mod: HCNC,CPTII,S$GLB, | Performed by: SURGERY

## 2024-12-03 PROCEDURE — 1125F AMNT PAIN NOTED PAIN PRSNT: CPT | Mod: HCNC,CPTII,S$GLB, | Performed by: SURGERY

## 2024-12-03 PROCEDURE — 99999 PR PBB SHADOW E&M-EST. PATIENT-LVL II: CPT | Mod: PBBFAC,HCNC,, | Performed by: SURGERY

## 2024-12-03 PROCEDURE — 3288F FALL RISK ASSESSMENT DOCD: CPT | Mod: HCNC,CPTII,S$GLB, | Performed by: SURGERY

## 2024-12-03 PROCEDURE — 1101F PT FALLS ASSESS-DOCD LE1/YR: CPT | Mod: HCNC,CPTII,S$GLB, | Performed by: SURGERY

## 2024-12-03 PROCEDURE — 93990 DOPPLER FLOW TESTING: CPT | Mod: HCNC,S$GLB,, | Performed by: SURGERY

## 2024-12-03 PROCEDURE — 3044F HG A1C LEVEL LT 7.0%: CPT | Mod: HCNC,CPTII,S$GLB, | Performed by: SURGERY

## 2024-12-03 PROCEDURE — 3066F NEPHROPATHY DOC TX: CPT | Mod: HCNC,CPTII,S$GLB, | Performed by: SURGERY

## 2024-12-03 PROCEDURE — 1160F RVW MEDS BY RX/DR IN RCRD: CPT | Mod: HCNC,CPTII,S$GLB, | Performed by: SURGERY

## 2024-12-03 PROCEDURE — 99214 OFFICE O/P EST MOD 30 MIN: CPT | Mod: HCNC,S$GLB,, | Performed by: SURGERY

## 2024-12-03 PROCEDURE — 3078F DIAST BP <80 MM HG: CPT | Mod: HCNC,CPTII,S$GLB, | Performed by: SURGERY

## 2024-12-03 PROCEDURE — 1159F MED LIST DOCD IN RCRD: CPT | Mod: HCNC,CPTII,S$GLB, | Performed by: SURGERY

## 2024-12-03 PROCEDURE — 3074F SYST BP LT 130 MM HG: CPT | Mod: HCNC,CPTII,S$GLB, | Performed by: SURGERY

## 2024-12-03 NOTE — PROGRESS NOTES
VASCULAR SURGERY SERVICE    REFERRING DOCTOR: Lavinia Nieto NP     CHIEF COMPLAINT:  End-stage renal disease    HISTORY OF PRESENT ILLNESS: Cecile Bowen is a 74 y.o. female with prior simple mastectomy on the left, sentinel node biopsy, no axillary node dissection, with end-stage renal disease times 3 months via right IJ PermCath who is sent for permanent dialysis access.  She is currently dialyzing only Mondays and Fridays.    She is right-handed.  She has no history of AICD or pacemaker placement.  She does have known bilateral carpal tunnel syndrome with the occasional numbness in her hands but has not had a release.    She takes Eliquis 5 mg but only once a day because of easy bruising.  She is on clear why she is on Eliquis    Finally she takes chronic narcotics for back pain    S/p    1a.  Diagnostic left arm arteriogram 09/19/2024  1/ trans radial PTA, left AV fistula 04/29/2024  2. left brachiocephalic AV fistula creation 02/14/2024    3/5/2024:  This is initial follow-up after left brachiocephalic AV fistula creation 02/14/2024.  She is without complaint specifically no hand pain weakness or numbness    04/09/2024:  She now returns.  She is dialyzing on Mondays and Fridays only.  She denies any hand pain weakness or numbness    06/11/2024:  She now returns 6 weeks status post angioplasty left AV fistula.  She is still dialyzing only Mondays and Fridays via PermCath    08/13/2024:   She now returns.  She was using the fistula with a 1 and 1 with the PermCath but they have not able to accessed the fistula in the last 2 sessions.   Bilateral carpal tunnel syndrome since I was in high school but has never had surgery for this.  However in the left hand she says that her episodes of numbness have worsened in the last 2-3 weeks and occasionally has some pain.    She also notes a tiny sore at the base of her 4th digit at a nailbed    09/03/2024:  She now returns in continues use her PermCath.    Notably she has been nonambulatory for better part of a year.   Regarding her left hand she does report increased numbness for the last 6 months corresponding to after her fistula was placed.  Denies any weakness, rarely says she has some pain.  Notably she has had carpal tunnel syndrome since I was in high school we and wears a chronic hand brace.    She had tiny ulceration of the base of her left 4th digit when I saw her 2 weeks ago.  She reports this is completely healed but she has developed a new similar lesion on the left 3rd finger which he ascribes to a ingrown nail    She is on chronic Eliquis but was not sure why she was on this.  Looking back in her chart it appears that she was diagnosed with atrial fibrillation 2021 but this is not on her problem list.    10/15/2024:   She now returns after diagnostic arteriogram of the left arm to evaluate for steal.  In the interim she has also started to cannulate her AV fistula has used exclusively for 2 weeks.  She is now complaining of left hand pain at night over the last 7-10 days which was not prior before.  Notably the small ulceration she had had a nailbed have healed however.  She does have prior carpal tunnel on this side and wears a chronic brace    12/03/2024:  A banding procedure but had to cancel because of transportation issues.  Notably however her intermittent hand pain has resolved and she now only having fingertip numbness.  All ulceration fingers also healed.  She was using the fistula essentially exclusively but 2-3 days ago had a episode of extravasation    Past Medical History:   Diagnosis Date    Cervicogenic migraine     Chronic pain     CKD (chronic kidney disease) stage 4, GFR 15-29 ml/min     [facility]    CKD (chronic kidney disease) stage 4, GFR 15-29 ml/min     Diabetes mellitus     Long term use of Insulin, Diabetic Neuropathy    Dialysis patient 1/21/2022    Fibromyalgia     Hydronephrosis     Hyperlipidemia     Hypertension  12/12/2012    Hypothyroidism 12/12/2012    ARON (iron deficiency anemia)     Insomnia     Levoscoliosis     Malignant neoplasm of upper-outer quadrant of left breast in female, estrogen receptor positive     Metabolic acidosis     Mobility impaired     Nuclear sclerosis - Both Eyes 3/24/2014    VIJAYA (obstructive sleep apnea)     Osteopenia     Pulmonary nodule     Recurrent UTI     Renal manifestation of secondary diabetes mellitus     Secondary hyperparathyroidism, renal     Urinary retention        Past Surgical History:   Procedure Laterality Date    ANGIOGRAPHY OF UPPER EXTREMITY N/A 9/19/2024    Procedure: Angiogram Extremity Bilateral;  Surgeon: RODRIGO Friedman III, MD;  Location: [facility] OR 2ND FLR;  Service: Vascular;  Laterality: N/A;  R femoral approach, arch & arm angiogram  168.76mgy  33.1145 gycm2  8.9min    AV FISTULA PLACEMENT Left 2/14/2024    Procedure: CREATION, AV FISTULA;  Surgeon: RODRIGO Friedman III, MD;  Location: [facility] OR 2ND FLR;  Service: Vascular;  Laterality: Left;  LUE AVF creation vs AVG insertion    BIOPSY OF URETER Left 2/18/2022    Procedure: BIOPSY, URETER;  Surgeon: Ronel Whittington MD;  Location: [facility] OR 1ST FLR;  Service: Urology;  Laterality: Left;    BREAST BIOPSY Right     benign    CHOLECYSTECTOMY      COLONOSCOPY N/A 1/13/2017    Procedure: COLONOSCOPY;  Surgeon: Morris Wiseman MD;  Location: [facility] ENDO (4TH FLR);  Service: Endoscopy;  Laterality: N/A;  Renal pt Nephrology advised to avoid phosphate preps    COLONOSCOPY N/A 12/7/2023    Procedure: COLONOSCOPY;  Surgeon: Herve Allen MD;  Location: [facility] ENDO (2ND FLR);  Service: Endoscopy;  Laterality: N/A;  HD MWF; labs prior  suprapubic catheter  pt does not ambulate-uses christina lift- will have sling with her  9/7 ref. by Anastasiia Campbell, , PEG, instr. mailed, Eliquis hold approval pending-st  ok to hold Eliquis 2 days per Dr Navarro-GT  1/30-precall complete-MS    CYSTOSCOPY N/A 10/8/2018    Procedure:  CYSTOSCOPY;  Surgeon: Ronel Whittington MD;  Location: [facility] OR 1ST FLR;  Service: Urology;  Laterality: N/A;  45 min    CYSTOSCOPY N/A 3/25/2019    Procedure: CYSTOSCOPY;  Surgeon: Ronel Whittington MD;  Location: [facility] OR 1ST FLR;  Service: Urology;  Laterality: N/A;  45 min    CYSTOSCOPY N/A 8/26/2019    Procedure: CYSTOSCOPY;  Surgeon: Ronel Whittington MD;  [facility]  Service: Urology;  Laterality: N/A;  45 min    CYSTOSCOPY N/A 7/2/2021    Procedure: CYSTOSCOPY;  Surgeon: Ronel Whittington MD;  [facility];  Service: Urology;  Laterality: N/A;    CYSTOSCOPY  12/23/2021    Procedure: CYSTOSCOPY;  Surgeon: Ronel Whittington MD;  [facility];  Service: Urology;;    CYSTOSCOPY  2/18/2022    Procedure: CYSTOSCOPY;  Surgeon: Ronel Whittington MD;  [facility];  Service: Urology;;    CYSTOSCOPY W/ URETERAL STENT PLACEMENT Left 5/15/2021    Procedure: CYSTOSCOPY, WITH URETERAL STENT INSERTION;  Surgeon: Levy Sánchez Jr., MD;  [facility];  Service: Urology;  Laterality: Left;    CYSTOSCOPY WITH BIOPSY OF BLADDER N/A 1/27/2020    Procedure: CYSTOSCOPY, WITH BLADDER BIOPSY, WITH FULGURATION IF INDICATED;  Surgeon: Ronel Whittington MD;  Location: [facility] OR [address] [place] [address];  Service: Urology;  Laterality: N/A;    DILATION AND CURETTAGE OF UTERUS      FISTULOGRAM N/A 4/29/2024    Procedure: Fistulogram;  Surgeon: RODRIGO Friedman III, MD;  Location: [facility] CATH LAB;  Service: Peripheral Vascular;  Laterality: N/A;    GALLBLADDER SURGERY  2006    HYSTERECTOMY      INJECTION FOR SENTINEL NODE IDENTIFICATION Left 8/1/2019    Procedure: INJECTION, FOR SENTINEL NODE IDENTIFICATION;  Surgeon: Saima Fulton MD;  Location: [facility] OR 2ND FLR;  Service: General;  Laterality: Left;    INJECTION OF BOTULINUM TOXIN TYPE A N/A 10/8/2018    Procedure: INJECTION, BOTULINUM TOXIN, TYPE A 300 UNITS;  Surgeon: Ronel Whittington MD;   [address]  Service: Urology;  Laterality: N/A;    INJECTION OF BOTULINUM TOXIN TYPE A N/A 3/25/2019    Procedure: INJECTION, BOTULINUM TOXIN, TYPE A 300 UNITS;  Surgeon: Ronel Whittington MD;  [facility];  Service: Urology;  Laterality: N/A;    INJECTION OF BOTULINUM TOXIN TYPE A N/A 8/26/2019    Procedure: INJECTION, BOTULINUM TOXIN, TYPE A 300 UNITS;  Surgeon: Ronel Whittington MD;  Location: [facility] OR [address] [place] [address];  Service: Urology;  Laterality: N/A;    MASTECTOMY Left 8/1/2019    Procedure: MASTECTOMY 23 hour stay;  Surgeon: Samia Fulton MD;  Location: [facility] OR [address] [place] [address];  Service: General;  Laterality: Left;    MEDIPORT REMOVAL Right 6/24/2022    Procedure: REMOVAL, CATHETER, CENTRAL VENOUS, TUNNELED, WITH PORT;  Surgeon: Isauro Anderson MD;  Location: [facility] CATH LAB;  Service: Radiology;  Laterality: Right;    OVARIAN CYST SURGERY  1985    PERCUTANEOUS TRANSLUMINAL ANGIOPLASTY OF ARTERIOVENOUS FISTULA Left 4/29/2024    Procedure: PTA, AV FISTULA;  Surgeon: RODRIGO Friedman III, MD;  Location: [facility] CATH LAB;  Service: Peripheral Vascular;  Laterality: Left;    REPLACEMENT OF STENT Left 12/23/2021    Procedure: REPLACEMENT, STENT;  Surgeon: Ronel Whittington MD;  Location: [address];  Service: Urology;  Laterality: Left;    REPLACEMENT OF STENT Left 2/18/2022    Procedure: REPLACEMENT, STENT;  Surgeon: Ronel Whittington MD;  Location: [address];  Service: Urology;  Laterality: Left;    RETROGRADE PYELOGRAPHY Left 2/18/2022    Procedure: PYELOGRAM, RETROGRADE;  Surgeon: Ronel Whittington MD;  Location: [facility] OR [address] [place] [address];  Service: Urology;  Laterality: Left;    SENTINEL LYMPH NODE BIOPSY Left 8/1/2019    Procedure: BIOPSY, LYMPH NODE, SENTINEL;  Surgeon: Samia Fulton MD;  Location: [address];  Service: General;  Laterality: Left;    spt placement      TONSILLECTOMY, ADENOIDECTOMY      TOTAL ABDOMINAL HYSTERECTOMY W/ BILATERAL  "SALPINGOOPHORECTOMY  1985    URETEROSCOPY Left 2/18/2022    Procedure: URETEROSCOPY;  Surgeon: Ronel Whittington MD;  Location: [facility] OR [address] [place] [address];  Service: Urology;  Laterality: Left;         Current Outpatient Medications:     acetaminophen (TYLENOL) 325 MG tablet, Take 2 tablets (650 mg total) by mouth every 6 (six) hours as needed for Pain., Disp: , Rfl:     amLODIPine (NORVASC) 10 MG tablet, Take 10 mg by mouth., Disp: , Rfl:     anastrozole (ARIMIDEX) 1 mg Tab, TAKE 1 TABLET BY MOUTH EVERY DAY, Disp: 90 tablet, Rfl: 2    apixaban (ELIQUIS) 5 mg Tab, Take 1 tablet (5 mg total) by mouth Daily., Disp: , Rfl:     atorvastatin (LIPITOR) 80 MG tablet, Take 1 tablet (80 mg total) by mouth once daily., Disp: 90 tablet, Rfl: 3    BD INSULIN SYRINGE ULTRA-FINE 0.5 mL 31 gauge x 5/16" Syrg, USE WITH INSULIN 4 TIMES A DAY, Disp: 100 each, Rfl: 12    calcium acetate,phosphat bind, (PHOSLO) 667 mg capsule, Take 667 mg by mouth 3 (three) times daily with meals., Disp: , Rfl:     carvediloL (COREG) 12.5 MG tablet, Take 1 tablet (12.5 mg total) by mouth 2 (two) times daily., Disp: 180 tablet, Rfl: 3    diclofenac sodium (VOLTAREN) 1 % Gel, Apply 2 g topically once daily., Disp: , Rfl:     DULoxetine (CYMBALTA) 20 MG capsule, Take 2 capsules (40 mg total) by mouth once daily., Disp: 180 capsule, Rfl: 1    erenumab-aooe (AIMOVIG AUTOINJECTOR) 140 mg/mL autoinjector, 140 mg MONTHLY (route: subcutaneous), Disp: 1 each, Rfl: 11    ergocalciferol (ERGOCALCIFEROL) 50,000 unit Cap, Take 1 capsule (50,000 Units total) by mouth every 7 days., Disp: 12 capsule, Rfl: 3    HYDROcodone-acetaminophen (NORCO)  mg per tablet, Take 1 tablet by mouth every 6 (six) hours as needed for Pain., Disp: 120 tablet, Rfl: 0    LANTUS SOLOSTAR U-100 INSULIN 100 unit/mL (3 mL) InPn pen, INJECT 12-15 UNITS UNDER THE SKIN at night, Disp: 15 mL, Rfl: 3    losartan (COZAAR) 100 MG tablet, Take 1 tablet (100 mg total) by mouth once daily., Disp: 90 " "tablet, Rfl: 3    methocarbamoL (ROBAXIN) 750 MG Tab, TAKE 1 TO 2 TABLETS(750 TO 1500 MG) BY MOUTH THREE TIMES DAILY AS NEEDED FOR MUSCLE SPASMS, Disp: 180 tablet, Rfl: 1    ondansetron (ZOFRAN-ODT) 8 MG TbDL, 8 mg EVERY 12 HOURS (route: oral), Disp: , Rfl:     oxybutynin (DITROPAN-XL) 10 MG 24 hr tablet, TAKE 1 TABLET BY MOUTH EVERY DAY, Disp: 90 tablet, Rfl: 4    patiromer calcium sorbitex (VELTASSA) 16.8 gram PwPk, Take 1 packet (16.8 g total) by mouth once daily. for 90 doses, Disp: 30 packet, Rfl: 2    pen needle, diabetic (BD ULTRA-FINE SHORT PEN NEEDLE) 31 gauge x 5/16" Ndle, USE WITH LANTUS DAILY, e 11.65, Disp: 100 each, Rfl: 3    silver sulfADIAZINE 1% (SILVADENE) 1 % cream, Apply topically Every 3 (three) days., Disp: 20 g, Rfl: 0    sodium bicarbonate 650 MG tablet, Take 1 tablet (650 mg total) by mouth once daily., Disp: 30 tablet, Rfl: 11    sumatriptan (IMITREX) 100 MG tablet, Take 1 tablet (100 mg total) by mouth every 2 (two) hours as needed for Migraine (Limit 2 in 14 hours and 9 in one month)., Disp: 30 tablet, Rfl: 1    SYNTHROID 50 mcg tablet, TAKE 1 TABLET BY MOUTH BEFORE BREAKFAST. ADMINISTER ON AN EMPTY STOMACH AT LEAST 30 MINUTES BEFORE FOOD. IF RECEIVING TUBE FEEDS, HOLD TUBE FEEDS FOR 1 HOUR BEFORE AND AFTER LEVOTHYROXINE ADMINISTRATION., Disp: 90 tablet, Rfl: 2    traZODone (DESYREL) 100 MG tablet, TAKE 1 TABLET BY MOUTH NIGHTLY AS NEEDED FOR INSOMNIA., Disp: 90 tablet, Rfl: 2    UNABLE TO FIND, medication name: Tylenol Migraine- APAP, ASA, caffeine, Disp: , Rfl:     VELPHORO 500 mg Chew, CHEW INSERT TABLET 5 TIMES DAILY WITH MEAL AND SNACK, Disp: , Rfl:     furosemide (LASIX) 40 MG tablet, Take 1 tablet (40 mg total) by mouth once daily., Disp: 30 tablet, Rfl: 11    oxyCODONE (ROXICODONE) 5 MG immediate release tablet, Take 1 tablet (5 mg total) by mouth daily as needed (severe pain)., Disp: 30 tablet, Rfl: 0  No current facility-administered medications for this " visit.    Facility-Administered Medications Ordered in Other Visits:     epoetin chloe injection 2,000 Units, 2,000 Units, Intravenous, 1 time in Clinic/HOD, Emmanuel Hare Jr., MD    heparin (porcine) injection 2,000 Units, 2,000 Units, Intravenous, Once, Emmanuel Hare Jr., MD    heparin (porcine) injection 2,000 Units, 2,000 Units, Intravenous, Once, Emmanuel Hare Jr., MD    heparin (porcine) injection 4,000 Units, 4,000 Units, Intra-Catheter, 1 time in Clinic/HOD, Emmanuel Hare Jr., MD    iron sucrose injection 100 mg, 100 mg, Intravenous, 1 time in Clinic/HOD, Emmanuel Hare Jr., MD    Review of patient's allergies indicates:  No Known Allergies    Family History   Problem Relation Name Age of Onset    Diabetes Sister Kat     Kidney disease Sister Kat         CKD III    ALS Mother          d.    Cancer Maternal Grandmother          d. colon    Cancer Paternal Grandfather          d. lung    Scoliosis Brother [place]         increased pain    Prostate cancer Brother [place]         cured s/p surgery    Cancer Brother [place]         rare cancer that got into the bones    Diabetes Maternal Aunt      Kidney disease Maternal Aunt      Diabetes Maternal Uncle      Amblyopia Neg Hx      Blindness Neg Hx      Cataracts Neg Hx      Glaucoma Neg Hx      Macular degeneration Neg Hx      Retinal detachment Neg Hx      Strabismus Neg Hx         Social History     Tobacco Use    Smoking status: Never    Smokeless tobacco: Never   Substance Use Topics    Alcohol use: No     Alcohol/week: 0.0 standard drinks of alcohol    Drug use: No     PHYSICAL EXAM:   BP (!) 123/58 (BP Location: Right arm, Patient Position: Sitting)   Pulse 65   Temp 98.4 °F (36.9 °C) (Oral)   Constitutional:  Alert,   Well-appearing  In no distress.  Traveling by wheelchair   Neurological: Normal speech  no focal findings  CN II - XII grossly intact.    Psychiatric: Mood and affect appropriate and symmetric.   HEENT:  Normocephalic / atraumatic  PERRLA  Midline trachea  No scars across the neck   Cardiac: Regular rate and rhythm.   Pulmonary: Normal pulmonary effort.   Abdomen: Soft, not distended.     Skin: Warm and well perfused.    Vascular:  Radial pulse on the left today is trace 1+ palpable, although it clearly stronger with fistula compression   Extremities/  Musculoskeletal: Left arm:  Left brachiocephalic AV fistula has significant excellent thrill with no significant pulsatility.  Moderate hematoma in the mid upper arm with some ecchymosis consistent with recent extravasation event    Left hand 5/5  strength.     08/13/2024      IMAGING:  Duplex of the fistula showed to be patent without stenosis with a flow volume of 1.9 L.  Small hematoma is noted    Prior Left finger PPG is are completely flat in all digits.  This does improve somewhat with fistula compression  Duplex of the fistula demonstrates no stenosis, flow volume of 1.9 L  Diameter 8.9 proximal, 8.3 mid, 7.6 distal  Depth 4.1 proximal, 5.9 minute, 6.5 distal      IMPRESSION:   Improved vascular steal.  Patient was not having any hand pain now.  We will not reschedule the banding procedure.  Patient has a stable flow volume of 1.9 L in this fistula.  Recent extravasation event.  The patient is comfortable taking the PermCath that at this time  3.  Narcotic dependence for chronic back pain.  4.  Nonambulatory status x1 year  5.  On chronic Eliquis, evidently for atrial fibrillation diagnosed in 2021.  By clinical exam she does not feel that she is in AFib.  She was unaware of why she is on the Eliquis       PLAN:    Follow up 4 weeks for PermCath removal.  Hold Eliquis for 2 days prior    WALE Friedman III, MD, FACS  Professor and Chief, Vascular and Endovascular Surgery

## 2024-12-05 ENCOUNTER — NURSE TRIAGE (OUTPATIENT)
Dept: ADMINISTRATIVE | Facility: CLINIC | Age: 74
End: 2024-12-05
Payer: MEDICARE

## 2024-12-05 ENCOUNTER — OFFICE VISIT (OUTPATIENT)
Dept: INTERNAL MEDICINE | Facility: CLINIC | Age: 74
End: 2024-12-05
Payer: MEDICARE

## 2024-12-05 DIAGNOSIS — R05.9 COUGH, UNSPECIFIED TYPE: ICD-10-CM

## 2024-12-05 DIAGNOSIS — J01.90 ACUTE RHINOSINUSITIS: Primary | ICD-10-CM

## 2024-12-05 DIAGNOSIS — J06.9 UPPER RESPIRATORY TRACT INFECTION, UNSPECIFIED TYPE: ICD-10-CM

## 2024-12-05 PROCEDURE — 3066F NEPHROPATHY DOC TX: CPT | Mod: HCNC,CPTII,95, | Performed by: NURSE PRACTITIONER

## 2024-12-05 PROCEDURE — 3044F HG A1C LEVEL LT 7.0%: CPT | Mod: HCNC,CPTII,95, | Performed by: NURSE PRACTITIONER

## 2024-12-05 PROCEDURE — 99213 OFFICE O/P EST LOW 20 MIN: CPT | Mod: HCNC,95,, | Performed by: NURSE PRACTITIONER

## 2024-12-05 PROCEDURE — 4010F ACE/ARB THERAPY RXD/TAKEN: CPT | Mod: HCNC,CPTII,95, | Performed by: NURSE PRACTITIONER

## 2024-12-05 RX ORDER — DOXYCYCLINE HYCLATE 100 MG
100 TABLET ORAL 2 TIMES DAILY
Qty: 20 TABLET | Refills: 0 | Status: SHIPPED | OUTPATIENT
Start: 2024-12-05 | End: 2024-12-15

## 2024-12-05 RX ORDER — BENZONATATE 100 MG/1
100 CAPSULE ORAL 3 TIMES DAILY PRN
Qty: 30 CAPSULE | Refills: 0 | Status: SHIPPED | OUTPATIENT
Start: 2024-12-05 | End: 2024-12-15

## 2024-12-05 RX ORDER — IPRATROPIUM BROMIDE 21 UG/1
2 SPRAY, METERED NASAL 2 TIMES DAILY
Qty: 30 ML | Refills: 0 | Status: SHIPPED | OUTPATIENT
Start: 2024-12-05

## 2024-12-05 NOTE — PROGRESS NOTES
The patient location is: [place]   The chief complaint leading to consultation is: Cold symptoms     Visit type: audiovisual    Face to Face time with patient: 15minutes of total time spent on the encounter, which includes face to face time and non-face to face time preparing to see the patient (eg, review of tests), Obtaining and/or reviewing separately obtained history, Documenting clinical information in the electronic or other health record, Independently interpreting results (not separately reported) and communicating results to the patient/family/caregiver, or Care coordination (not separately reported).         Each patient to whom he or she provides medical services by telemedicine is:  (1) informed of the relationship between the physician and patient and the respective role of any other health care provider with respect to management of the patient; and (2) notified that he or she may decline to receive medical services by telemedicine and may withdraw from such care at any time.    Notes:    *Due to AV technical difficulties, called patient to conduct the visit today instead.     Noted RN Triage note.   Patient is new to me.   Est'd with Dr. Cancino.   Very pleasant lady.   (We attempted to connect via AV, she and I had some audio issues and aborted the AV; but was able to call her on her listed number and connect with her with way to address her current concerns)  Requesting an AV visit today to address 'headaches, cough, sneezing and sinus pressure and congestion' x 3 days with isolated temp of 99.7 at home.   No SOB or chest wall pain.     A/P:   PMH: ESRD (MWF), chronic LBP, DM II, CPS     Acute rhinosinusitis    Upper respiratory tract infection, unspecified type    Cough, unspecified type  -     doxycycline (VIBRA-TABS) 100 MG tablet; Take 1 tablet (100 mg total) by mouth 2 (two) times daily. for 10 days  Dispense: 20 tablet; Refill: 0  -     benzonatate (TESSALON) 100 MG capsule; Take 1 capsule  (100 mg total) by mouth 3 (three) times daily as needed for Cough.  Dispense: 30 capsule; Refill: 0  -     ipratropium (ATROVENT) 21 mcg (0.03 %) nasal spray; 2 sprays by Each Nostril route 2 (two) times daily.  Dispense: 30 mL; Refill: 0    *Has been advised that fevers should persist over the next 24 hours on antibiotic and / or symptoms are not improving over the next 24 hours, needs to report to nearest Urgent Care or the ER.     Future Appointments   Date Time Provider Department Center   12/6/2024 11:30 AM CHAIR 17, [facility]   12/9/2024 11:30 AM CHAIR 17, [facility]   12/11/2024 11:30 AM CHAIR 25, [facility]   12/13/2024  5:00 AM CHAIR 17, [facility]   12/16/2024 11:30 AM CHAIR 17, [facility]   12/18/2024 11:30 AM CHAIR 25, [facility]   12/20/2024 11:30 AM CHAIR 17, [facility]   12/23/2024 11:30 AM CHAIR 17, [facility]   12/26/2024 11:30 AM CHAIR 21, [facility]   12/27/2024 11:30 AM CHAIR 17, [facility]   12/30/2024 11:30 AM CHAIR 17, [facility]   12/31/2024  9:00 AM RODRIGO Friedman III, MD [facility]   1/7/2025  8:30 AM Ada Soni DPM [facility] POD [facility]   4/9/2025 11:30 AM Brooke Gabriel APRN, FNP [facility] IM [facility] PC[facility]        Medication List            Accurate as of December 5, 2024  1:35 PM. If you have any questions, ask your nurse or doctor.                START taking these medications      benzonatate 100 MG capsule  Commonly known as: TESSALON  Take 1 capsule (100 mg total) by mouth 3 (three) times daily as needed for Cough.  Started by: PRUDENCE Winter     doxycycline 100 MG tablet  Commonly known as: VIBRA-TABS  Take 1 tablet (100 mg  "total) by mouth 2 (two) times daily. for 10 days  Started by: PRUDENCE Winter     ipratropium 21 mcg (0.03 %) nasal spray  Commonly known as: ATROVENT  2 sprays by Each Nostril route 2 (two) times daily.  Started by: PRUDENCE Winter            CONTINUE taking these medications      acetaminophen 325 MG tablet  Commonly known as: TYLENOL  Take 2 tablets (650 mg total) by mouth every 6 (six) hours as needed for Pain.     AIMOVIG AUTOINJECTOR 140 mg/mL autoinjector  Generic drug: erenumab-aooe  140 mg MONTHLY (route: subcutaneous)     amLODIPine 10 MG tablet  Commonly known as: NORVASC     anastrozole 1 mg Tab  Commonly known as: ARIMIDEX  TAKE 1 TABLET BY MOUTH EVERY DAY     apixaban 5 mg Tab  Commonly known as: ELIQUIS     atorvastatin 80 MG tablet  Commonly known as: LIPITOR  Take 1 tablet (80 mg total) by mouth once daily.     BD INSULIN SYRINGE ULTRA-FINE 0.5 mL 31 gauge x 5/16" Syrg  Generic drug: insulin syringe-needle U-100  USE WITH INSULIN 4 TIMES A DAY     calcium acetate(phosphat bind) 667 mg capsule  Commonly known as: PHOSLO     carvediloL 12.5 MG tablet  Commonly known as: COREG  Take 1 tablet (12.5 mg total) by mouth 2 (two) times daily.     diclofenac sodium 1 % Gel  Commonly known as: VOLTAREN     DULoxetine 20 MG capsule  Commonly known as: CYMBALTA  Take 2 capsules (40 mg total) by mouth once daily.     ergocalciferol 50,000 unit Cap  Commonly known as: ERGOCALCIFEROL  Take 1 capsule (50,000 Units total) by mouth every 7 days.     furosemide 40 MG tablet  Commonly known as: LASIX  Take 1 tablet (40 mg total) by mouth once daily.     HYDROcodone-acetaminophen  mg per tablet  Commonly known as: NORCO  Take 1 tablet by mouth every 6 (six) hours as needed for Pain.     LANTUS SOLOSTAR U-100 INSULIN 100 unit/mL (3 mL) Inpn pen  Generic drug: insulin glargine U-100 (Lantus)  INJECT 12-15 UNITS UNDER THE SKIN at night     losartan 100 MG tablet  Commonly known as: COZAAR  Take " "1 tablet (100 mg total) by mouth once daily.     methocarbamoL 750 MG Tab  Commonly known as: ROBAXIN  TAKE 1 TO 2 TABLETS(750 TO 1500 MG) BY MOUTH THREE TIMES DAILY AS NEEDED FOR MUSCLE SPASMS     ondansetron 8 MG Tbdl  Commonly known as: ZOFRAN-ODT     oxybutynin 10 MG 24 hr tablet  Commonly known as: DITROPAN-XL  TAKE 1 TABLET BY MOUTH EVERY DAY     oxyCODONE 5 MG immediate release tablet  Commonly known as: ROXICODONE  Take 1 tablet (5 mg total) by mouth daily as needed (severe pain).     pen needle, diabetic 31 gauge x 5/16" Ndle  Commonly known as: BD ULTRA-FINE SHORT PEN NEEDLE  USE WITH LANTUS DAILY, e 11.65     silver sulfADIAZINE 1% 1 % cream  Commonly known as: SILVADENE  Apply topically Every 3 (three) days.     sodium bicarbonate 650 MG tablet  Take 1 tablet (650 mg total) by mouth once daily.     sumatriptan 100 MG tablet  Commonly known as: IMITREX  Take 1 tablet (100 mg total) by mouth every 2 (two) hours as needed for Migraine (Limit 2 in 14 hours and 9 in one month).     SYNTHROID 50 mcg tablet  Generic drug: levothyroxine  TAKE 1 TABLET BY MOUTH BEFORE BREAKFAST. ADMINISTER ON AN EMPTY STOMACH AT LEAST 30 MINUTES BEFORE FOOD. IF RECEIVING TUBE FEEDS, HOLD TUBE FEEDS FOR 1 HOUR BEFORE AND AFTER LEVOTHYROXINE ADMINISTRATION.     traZODone 100 MG tablet  Commonly known as: DESYREL  TAKE 1 TABLET BY MOUTH NIGHTLY AS NEEDED FOR INSOMNIA.     UNABLE TO FIND     VELPHORO 500 mg Chew  Generic drug: sucroferric oxyhydroxide     VELTASSA 16.8 gram Pwpk  Generic drug: patiromer calcium sorbitex  Take 1 packet (16.8 g total) by mouth once daily. for 90 doses               Where to Get Your Medications        These medications were sent to [id number] - [place]  [address] AT [address]  [place] [address]      Phone: [phone]   benzonatate 100 MG capsule  doxycycline 100 MG tablet  ipratropium 21 mcg (0.03 %) nasal spray   "       S Luzma Watson, NP

## 2024-12-05 NOTE — TELEPHONE ENCOUNTER
Pt reports cough, HA, sneezing, sinus pain, congestion, and wheezing that began 2-3 days ago. Pt reports temperature of 99.7 and denies higher temperature. Pt denies SOB. Current pain of 8/10. Care advice to go to office now. VV accepted and scheduled for pt. Pt verbalizes understanding.   Reason for Disposition   SEVERE sinus pain    Additional Information   Negative: Sounds like a life-threatening emergency to the triager   Negative: Difficulty breathing, and not from stuffy nose (e.g., not relieved by cleaning out the nose)   Negative: SEVERE headache and has fever   Negative: Patient sounds very sick or weak to the triager    Protocols used: Sinus Pain and Congestion-A-OH

## 2024-12-16 ENCOUNTER — TELEPHONE (OUTPATIENT)
Dept: PHYSICAL MEDICINE AND REHAB | Facility: CLINIC | Age: 74
End: 2024-12-16
Payer: MEDICARE

## 2024-12-16 NOTE — TELEPHONE ENCOUNTER
----- Message from Jaqueline sent at 12/13/2024  3:54 PM CST -----  Please refill the medication(s) listed below.Please call the patient when the prescription(s) is ready for  at this phone DANYELLE Schwartz [[phone]]        Medication #1oxyCODONE (ROXICODONE) 5 MG immediate release tablet          Preferred Pharmacy:  [id number]  [place] - [facility] [address] [address] [facility] [address]  [facility] [place] [address]  Phone: [phone] Fax: [phone]    Would the patient rather a call back or a response via [facility]? call     Best Call Back Number:      [phone]

## 2024-12-17 ENCOUNTER — DOCUMENT SCAN (OUTPATIENT)
Dept: HOME HEALTH SERVICES | Facility: HOSPITAL | Age: 74
End: 2024-12-17
Payer: MEDICARE

## 2024-12-17 RX ORDER — APIXABAN 5 MG/1
5 TABLET, FILM COATED ORAL 2 TIMES DAILY
Qty: 180 TABLET | Refills: 3 | Status: SHIPPED | OUTPATIENT
Start: 2024-12-17

## 2024-12-19 ENCOUNTER — PATIENT MESSAGE (OUTPATIENT)
Dept: PHYSICAL MEDICINE AND REHAB | Facility: CLINIC | Age: 74
End: 2024-12-19
Payer: MEDICARE

## 2024-12-19 DIAGNOSIS — M54.2 CHRONIC NECK PAIN: ICD-10-CM

## 2024-12-19 DIAGNOSIS — G89.29 CHRONIC NECK PAIN: ICD-10-CM

## 2024-12-19 DIAGNOSIS — M54.50 CHRONIC MIDLINE LOW BACK PAIN WITHOUT SCIATICA: ICD-10-CM

## 2024-12-19 DIAGNOSIS — G89.29 CHRONIC MIDLINE LOW BACK PAIN WITHOUT SCIATICA: ICD-10-CM

## 2024-12-19 DIAGNOSIS — M79.7 FIBROMYALGIA: ICD-10-CM

## 2024-12-20 RX ORDER — METHOCARBAMOL 750 MG/1
TABLET, FILM COATED ORAL
Qty: 180 TABLET | Refills: 1 | Status: SHIPPED | OUTPATIENT
Start: 2024-12-29

## 2024-12-20 RX ORDER — OXYCODONE HYDROCHLORIDE 5 MG/1
5 TABLET ORAL DAILY PRN
Qty: 30 TABLET | Refills: 0 | Status: SHIPPED | OUTPATIENT
Start: 2024-12-20 | End: 2025-01-19

## 2024-12-20 RX ORDER — HYDROCODONE BITARTRATE AND ACETAMINOPHEN 10; 325 MG/1; MG/1
1 TABLET ORAL EVERY 6 HOURS PRN
Qty: 120 TABLET | Refills: 0 | Status: SHIPPED | OUTPATIENT
Start: 2024-12-29 | End: 2025-01-28

## 2024-12-21 ENCOUNTER — DOCUMENT SCAN (OUTPATIENT)
Dept: HOME HEALTH SERVICES | Facility: HOSPITAL | Age: 74
End: 2024-12-21
Payer: MEDICARE

## 2024-12-26 ENCOUNTER — PATIENT MESSAGE (OUTPATIENT)
Dept: PODIATRY | Facility: CLINIC | Age: 74
End: 2024-12-26
Payer: MEDICARE

## 2024-12-27 ENCOUNTER — PATIENT MESSAGE (OUTPATIENT)
Dept: OPTOMETRY | Facility: CLINIC | Age: 74
End: 2024-12-27
Payer: MEDICARE

## 2024-12-31 ENCOUNTER — OFFICE VISIT (OUTPATIENT)
Dept: VASCULAR SURGERY | Facility: CLINIC | Age: 74
End: 2024-12-31
Payer: MEDICARE

## 2024-12-31 ENCOUNTER — TELEPHONE (OUTPATIENT)
Dept: VASCULAR SURGERY | Facility: CLINIC | Age: 74
End: 2024-12-31

## 2024-12-31 ENCOUNTER — HOSPITAL ENCOUNTER (INPATIENT)
Facility: HOSPITAL | Age: 74
LOS: 11 days | Discharge: HOME HEALTH | DRG: 698 | End: 2025-01-11
Attending: EMERGENCY MEDICINE | Admitting: EMERGENCY MEDICINE
Payer: MEDICARE

## 2024-12-31 VITALS — TEMPERATURE: 101 F | SYSTOLIC BLOOD PRESSURE: 160 MMHG | DIASTOLIC BLOOD PRESSURE: 71 MMHG | HEART RATE: 104 BPM

## 2024-12-31 DIAGNOSIS — R65.20 SEVERE SEPSIS: ICD-10-CM

## 2024-12-31 DIAGNOSIS — N18.6 ESRD (END STAGE RENAL DISEASE) ON DIALYSIS: ICD-10-CM

## 2024-12-31 DIAGNOSIS — R78.81 BACTEREMIA DUE TO ENTEROCOCCUS: ICD-10-CM

## 2024-12-31 DIAGNOSIS — B95.5 STREPTOCOCCAL BACTEREMIA: ICD-10-CM

## 2024-12-31 DIAGNOSIS — B95.2 BACTEREMIA DUE TO ENTEROCOCCUS: ICD-10-CM

## 2024-12-31 DIAGNOSIS — Z99.2 ESRD (END STAGE RENAL DISEASE) ON DIALYSIS: ICD-10-CM

## 2024-12-31 DIAGNOSIS — Z99.2 ESRD (END STAGE RENAL DISEASE) ON DIALYSIS: Primary | ICD-10-CM

## 2024-12-31 DIAGNOSIS — A41.9 SEPSIS, DUE TO UNSPECIFIED ORGANISM, UNSPECIFIED WHETHER ACUTE ORGAN DYSFUNCTION PRESENT: Primary | ICD-10-CM

## 2024-12-31 DIAGNOSIS — Z13.6 SCREENING FOR CARDIOVASCULAR CONDITION: ICD-10-CM

## 2024-12-31 DIAGNOSIS — N18.6 ESRD (END STAGE RENAL DISEASE) ON DIALYSIS: Primary | ICD-10-CM

## 2024-12-31 DIAGNOSIS — A41.9 SEVERE SEPSIS: ICD-10-CM

## 2024-12-31 DIAGNOSIS — R78.81 STREPTOCOCCAL BACTEREMIA: ICD-10-CM

## 2024-12-31 DIAGNOSIS — Z85.3 PERSONAL HISTORY OF MALIGNANT NEOPLASM OF BREAST: ICD-10-CM

## 2024-12-31 PROBLEM — E66.09 CLASS 1 OBESITY DUE TO EXCESS CALORIES WITH SERIOUS COMORBIDITY IN ADULT: Status: ACTIVE | Noted: 2017-01-09

## 2024-12-31 PROBLEM — T83.511A CATHETER-ASSOCIATED URINARY TRACT INFECTION: Status: ACTIVE | Noted: 2021-09-29

## 2024-12-31 PROBLEM — E11.9 TYPE 2 DIABETES MELLITUS TREATED WITH INSULIN: Status: ACTIVE | Noted: 2020-02-07

## 2024-12-31 PROBLEM — D63.1 ANEMIA IN ESRD (END-STAGE RENAL DISEASE): Status: ACTIVE | Noted: 2022-01-25

## 2024-12-31 PROBLEM — G92.9 TOXIC ENCEPHALOPATHY: Status: ACTIVE | Noted: 2024-12-31

## 2024-12-31 LAB
ALBUMIN SERPL BCP-MCNC: 3 G/DL (ref 3.5–5.2)
ALLENS TEST: NORMAL
ALP SERPL-CCNC: 158 U/L (ref 40–150)
ALT SERPL W/O P-5'-P-CCNC: 12 U/L (ref 10–44)
ANION GAP SERPL CALC-SCNC: 14 MMOL/L (ref 8–16)
APTT PPP: 38.7 SEC (ref 21–32)
AST SERPL-CCNC: 12 U/L (ref 10–40)
BACTERIA #/AREA URNS AUTO: ABNORMAL /HPF
BASOPHILS # BLD AUTO: 0.05 K/UL (ref 0–0.2)
BASOPHILS NFR BLD: 0.2 % (ref 0–1.9)
BILIRUB SERPL-MCNC: 0.4 MG/DL (ref 0.1–1)
BILIRUB UR QL STRIP: NEGATIVE
BUN SERPL-MCNC: 28 MG/DL (ref 8–23)
CALCIUM SERPL-MCNC: 8.9 MG/DL (ref 8.7–10.5)
CHLORIDE SERPL-SCNC: 98 MMOL/L (ref 95–110)
CLARITY UR REFRACT.AUTO: ABNORMAL
CO2 SERPL-SCNC: 17 MMOL/L (ref 23–29)
COLOR UR AUTO: YELLOW
CREAT SERPL-MCNC: 3.2 MG/DL (ref 0.5–1.4)
DIFFERENTIAL METHOD BLD: ABNORMAL
EOSINOPHIL # BLD AUTO: 0.1 K/UL (ref 0–0.5)
EOSINOPHIL NFR BLD: 0.3 % (ref 0–8)
ERYTHROCYTE [DISTWIDTH] IN BLOOD BY AUTOMATED COUNT: 13 % (ref 11.5–14.5)
EST. GFR  (NO RACE VARIABLE): 14.8 ML/MIN/1.73 M^2
ESTIMATED AVG GLUCOSE: 123 MG/DL (ref 68–131)
GLUCOSE SERPL-MCNC: 169 MG/DL (ref 70–110)
GLUCOSE UR QL STRIP: ABNORMAL
HBA1C MFR BLD: 5.9 % (ref 4–5.6)
HCT VFR BLD AUTO: 36.5 % (ref 37–48.5)
HGB BLD-MCNC: 11.3 G/DL (ref 12–16)
HGB UR QL STRIP: ABNORMAL
HYALINE CASTS UR QL AUTO: 0 /LPF
IMM GRANULOCYTES # BLD AUTO: 0.25 K/UL (ref 0–0.04)
IMM GRANULOCYTES NFR BLD AUTO: 1.2 % (ref 0–0.5)
INFLUENZA A, MOLECULAR: NOT DETECTED
INFLUENZA B, MOLECULAR: NOT DETECTED
INR PPP: 1 (ref 0.8–1.2)
KETONES UR QL STRIP: ABNORMAL
LACTATE SERPL-SCNC: 0.8 MMOL/L (ref 0.5–2.2)
LDH SERPL L TO P-CCNC: 1.01 MMOL/L (ref 0.5–2.2)
LEUKOCYTE ESTERASE UR QL STRIP: ABNORMAL
LYMPHOCYTES # BLD AUTO: 0.7 K/UL (ref 1–4.8)
LYMPHOCYTES NFR BLD: 3.5 % (ref 18–48)
MCH RBC QN AUTO: 30.1 PG (ref 27–31)
MCHC RBC AUTO-ENTMCNC: 31 G/DL (ref 32–36)
MCV RBC AUTO: 97 FL (ref 82–98)
MICROSCOPIC COMMENT: ABNORMAL
MONOCYTES # BLD AUTO: 0.6 K/UL (ref 0.3–1)
MONOCYTES NFR BLD: 2.7 % (ref 4–15)
NEUTROPHILS # BLD AUTO: 19.1 K/UL (ref 1.8–7.7)
NEUTROPHILS NFR BLD: 92.1 % (ref 38–73)
NITRITE UR QL STRIP: NEGATIVE
NRBC BLD-RTO: 0 /100 WBC
OHS QRS DURATION: 78 MS
OHS QTC CALCULATION: 432 MS
PH UR STRIP: 6 [PH] (ref 5–8)
PLATELET # BLD AUTO: 335 K/UL (ref 150–450)
PMV BLD AUTO: 8.5 FL (ref 9.2–12.9)
POCT GLUCOSE: 148 MG/DL (ref 70–110)
POCT GLUCOSE: 313 MG/DL (ref 70–110)
POTASSIUM SERPL-SCNC: 4.1 MMOL/L (ref 3.5–5.1)
PROCALCITONIN SERPL IA-MCNC: 1.41 NG/ML
PROT SERPL-MCNC: 8 G/DL (ref 6–8.4)
PROT UR QL STRIP: ABNORMAL
PROTHROMBIN TIME: 11.4 SEC (ref 9–12.5)
RBC # BLD AUTO: 3.76 M/UL (ref 4–5.4)
RBC #/AREA URNS AUTO: 14 /HPF (ref 0–4)
RSV AG BY MOLECULAR METHOD: NOT DETECTED
SAMPLE: NORMAL
SARS-COV-2 RNA RESP QL NAA+PROBE: NOT DETECTED
SITE: NORMAL
SODIUM SERPL-SCNC: 129 MMOL/L (ref 136–145)
SP GR UR STRIP: 1.02 (ref 1–1.03)
SQUAMOUS #/AREA URNS AUTO: 11 /HPF
TROPONIN I SERPL DL<=0.01 NG/ML-MCNC: 10 NG/L (ref 0–14)
TSH SERPL DL<=0.005 MIU/L-ACNC: 1.63 UIU/ML (ref 0.4–4)
URN SPEC COLLECT METH UR: ABNORMAL
WBC # BLD AUTO: 20.72 K/UL (ref 3.9–12.7)
WBC #/AREA URNS AUTO: >100 /HPF (ref 0–5)
WBC CLUMPS UR QL AUTO: ABNORMAL

## 2024-12-31 PROCEDURE — 87077 CULTURE AEROBIC IDENTIFY: CPT | Mod: HCNC

## 2024-12-31 PROCEDURE — 3044F HG A1C LEVEL LT 7.0%: CPT | Mod: HCNC,CPTII,S$GLB, | Performed by: SURGERY

## 2024-12-31 PROCEDURE — 87150 DNA/RNA AMPLIFIED PROBE: CPT | Mod: 59,HCNC

## 2024-12-31 PROCEDURE — 99214 OFFICE O/P EST MOD 30 MIN: CPT | Mod: HCNC,S$GLB,, | Performed by: SURGERY

## 2024-12-31 PROCEDURE — 63600175 PHARM REV CODE 636 W HCPCS: Mod: HCNC

## 2024-12-31 PROCEDURE — 25000003 PHARM REV CODE 250: Mod: HCNC

## 2024-12-31 PROCEDURE — 25000003 PHARM REV CODE 250: Mod: HCNC | Performed by: HOSPITALIST

## 2024-12-31 PROCEDURE — 85730 THROMBOPLASTIN TIME PARTIAL: CPT | Mod: HCNC

## 2024-12-31 PROCEDURE — 3066F NEPHROPATHY DOC TX: CPT | Mod: HCNC,CPTII,S$GLB, | Performed by: SURGERY

## 2024-12-31 PROCEDURE — 83605 ASSAY OF LACTIC ACID: CPT | Mod: HCNC

## 2024-12-31 PROCEDURE — 83605 ASSAY OF LACTIC ACID: CPT | Mod: HCNC | Performed by: HOSPITALIST

## 2024-12-31 PROCEDURE — 85025 COMPLETE CBC W/AUTO DIFF WBC: CPT | Mod: HCNC

## 2024-12-31 PROCEDURE — 99999 PR PBB SHADOW E&M-EST. PATIENT-LVL IV: CPT | Mod: PBBFAC,HCNC,, | Performed by: SURGERY

## 2024-12-31 PROCEDURE — 85610 PROTHROMBIN TIME: CPT | Mod: HCNC

## 2024-12-31 PROCEDURE — 93005 ELECTROCARDIOGRAM TRACING: CPT | Mod: HCNC

## 2024-12-31 PROCEDURE — 80053 COMPREHEN METABOLIC PANEL: CPT | Mod: HCNC

## 2024-12-31 PROCEDURE — 0241U SARS-COV2 (COVID) WITH FLU/RSV BY PCR: CPT | Mod: HCNC | Performed by: EMERGENCY MEDICINE

## 2024-12-31 PROCEDURE — 87088 URINE BACTERIA CULTURE: CPT | Mod: HCNC

## 2024-12-31 PROCEDURE — 96365 THER/PROPH/DIAG IV INF INIT: CPT | Mod: HCNC

## 2024-12-31 PROCEDURE — 1160F RVW MEDS BY RX/DR IN RCRD: CPT | Mod: HCNC,CPTII,S$GLB, | Performed by: SURGERY

## 2024-12-31 PROCEDURE — 3078F DIAST BP <80 MM HG: CPT | Mod: HCNC,CPTII,S$GLB, | Performed by: SURGERY

## 2024-12-31 PROCEDURE — 87186 SC STD MICRODIL/AGAR DIL: CPT | Mod: HCNC

## 2024-12-31 PROCEDURE — 1159F MED LIST DOCD IN RCRD: CPT | Mod: HCNC,CPTII,S$GLB, | Performed by: SURGERY

## 2024-12-31 PROCEDURE — 81001 URINALYSIS AUTO W/SCOPE: CPT | Mod: HCNC

## 2024-12-31 PROCEDURE — 11000001 HC ACUTE MED/SURG PRIVATE ROOM: Mod: HCNC

## 2024-12-31 PROCEDURE — 3077F SYST BP >= 140 MM HG: CPT | Mod: HCNC,CPTII,S$GLB, | Performed by: SURGERY

## 2024-12-31 PROCEDURE — 63600175 PHARM REV CODE 636 W HCPCS: Mod: HCNC | Performed by: HOSPITALIST

## 2024-12-31 PROCEDURE — 99285 EMERGENCY DEPT VISIT HI MDM: CPT | Mod: 25,HCNC

## 2024-12-31 PROCEDURE — 51705 CHANGE OF BLADDER TUBE: CPT | Mod: HCNC

## 2024-12-31 PROCEDURE — 99222 1ST HOSP IP/OBS MODERATE 55: CPT | Mod: HCNC,,,

## 2024-12-31 PROCEDURE — 84145 PROCALCITONIN (PCT): CPT | Mod: HCNC | Performed by: HOSPITALIST

## 2024-12-31 PROCEDURE — 1125F AMNT PAIN NOTED PAIN PRSNT: CPT | Mod: HCNC,CPTII,S$GLB, | Performed by: SURGERY

## 2024-12-31 PROCEDURE — 3288F FALL RISK ASSESSMENT DOCD: CPT | Mod: HCNC,CPTII,S$GLB, | Performed by: SURGERY

## 2024-12-31 PROCEDURE — 4010F ACE/ARB THERAPY RXD/TAKEN: CPT | Mod: HCNC,CPTII,S$GLB, | Performed by: SURGERY

## 2024-12-31 PROCEDURE — 83036 HEMOGLOBIN GLYCOSYLATED A1C: CPT | Mod: HCNC

## 2024-12-31 PROCEDURE — 93010 ELECTROCARDIOGRAM REPORT: CPT | Mod: ,,,

## 2024-12-31 PROCEDURE — 84443 ASSAY THYROID STIM HORMONE: CPT | Mod: HCNC | Performed by: HOSPITALIST

## 2024-12-31 PROCEDURE — 87086 URINE CULTURE/COLONY COUNT: CPT | Mod: HCNC

## 2024-12-31 PROCEDURE — 87040 BLOOD CULTURE FOR BACTERIA: CPT | Mod: HCNC

## 2024-12-31 PROCEDURE — 1101F PT FALLS ASSESS-DOCD LE1/YR: CPT | Mod: HCNC,CPTII,S$GLB, | Performed by: SURGERY

## 2024-12-31 PROCEDURE — 21400001 HC TELEMETRY ROOM: Mod: HCNC

## 2024-12-31 PROCEDURE — 96366 THER/PROPH/DIAG IV INF ADDON: CPT | Mod: HCNC

## 2024-12-31 PROCEDURE — 87154 CUL TYP ID BLD PTHGN 6+ TRGT: CPT | Mod: HCNC

## 2024-12-31 PROCEDURE — 84484 ASSAY OF TROPONIN QUANT: CPT | Mod: HCNC

## 2024-12-31 RX ORDER — ONDANSETRON HYDROCHLORIDE 2 MG/ML
8 INJECTION, SOLUTION INTRAVENOUS EVERY 6 HOURS PRN
Status: DISCONTINUED | OUTPATIENT
Start: 2024-12-31 | End: 2025-01-11 | Stop reason: HOSPADM

## 2024-12-31 RX ORDER — IPRATROPIUM BROMIDE 42 UG/1
2 SPRAY, METERED NASAL 2 TIMES DAILY
Status: DISCONTINUED | OUTPATIENT
Start: 2024-12-31 | End: 2025-01-11 | Stop reason: HOSPADM

## 2024-12-31 RX ORDER — IBUPROFEN 200 MG
24 TABLET ORAL
Status: DISCONTINUED | OUTPATIENT
Start: 2024-12-31 | End: 2025-01-11 | Stop reason: HOSPADM

## 2024-12-31 RX ORDER — IBUPROFEN 200 MG
16 TABLET ORAL
Status: DISCONTINUED | OUTPATIENT
Start: 2024-12-31 | End: 2025-01-01

## 2024-12-31 RX ORDER — OXYBUTYNIN CHLORIDE 10 MG/1
10 TABLET, EXTENDED RELEASE ORAL DAILY
Status: DISCONTINUED | OUTPATIENT
Start: 2025-01-01 | End: 2025-01-11 | Stop reason: HOSPADM

## 2024-12-31 RX ORDER — LEVOTHYROXINE SODIUM 50 UG/1
50 TABLET ORAL
Status: DISCONTINUED | OUTPATIENT
Start: 2025-01-01 | End: 2025-01-11 | Stop reason: HOSPADM

## 2024-12-31 RX ORDER — IBUPROFEN 200 MG
24 TABLET ORAL
Status: DISCONTINUED | OUTPATIENT
Start: 2024-12-31 | End: 2025-01-01

## 2024-12-31 RX ORDER — METHOCARBAMOL 750 MG/1
750 TABLET, FILM COATED ORAL 3 TIMES DAILY PRN
Status: DISCONTINUED | OUTPATIENT
Start: 2024-12-31 | End: 2025-01-11 | Stop reason: HOSPADM

## 2024-12-31 RX ORDER — ANASTROZOLE 1 MG/1
1 TABLET ORAL DAILY
Status: DISCONTINUED | OUTPATIENT
Start: 2025-01-01 | End: 2025-01-11 | Stop reason: HOSPADM

## 2024-12-31 RX ORDER — ATORVASTATIN CALCIUM 40 MG/1
80 TABLET, FILM COATED ORAL DAILY
Status: DISCONTINUED | OUTPATIENT
Start: 2025-01-01 | End: 2025-01-11 | Stop reason: HOSPADM

## 2024-12-31 RX ORDER — ACETAMINOPHEN 325 MG/1
650 TABLET ORAL EVERY 4 HOURS PRN
Status: DISCONTINUED | OUTPATIENT
Start: 2024-12-31 | End: 2025-01-11 | Stop reason: HOSPADM

## 2024-12-31 RX ORDER — NALOXONE HCL 0.4 MG/ML
0.02 VIAL (ML) INJECTION
Status: DISCONTINUED | OUTPATIENT
Start: 2024-12-31 | End: 2025-01-11 | Stop reason: HOSPADM

## 2024-12-31 RX ORDER — IBUPROFEN 200 MG
16 TABLET ORAL
Status: DISCONTINUED | OUTPATIENT
Start: 2024-12-31 | End: 2025-01-11 | Stop reason: HOSPADM

## 2024-12-31 RX ORDER — SODIUM BICARBONATE 650 MG/1
650 TABLET ORAL DAILY
Status: DISCONTINUED | OUTPATIENT
Start: 2025-01-01 | End: 2025-01-11 | Stop reason: HOSPADM

## 2024-12-31 RX ORDER — INSULIN ASPART 100 [IU]/ML
0-5 INJECTION, SOLUTION INTRAVENOUS; SUBCUTANEOUS
Status: DISCONTINUED | OUTPATIENT
Start: 2024-12-31 | End: 2025-01-02

## 2024-12-31 RX ORDER — TRAZODONE HYDROCHLORIDE 100 MG/1
100 TABLET ORAL NIGHTLY PRN
Status: DISCONTINUED | OUTPATIENT
Start: 2024-12-31 | End: 2025-01-11 | Stop reason: HOSPADM

## 2024-12-31 RX ORDER — SODIUM CHLORIDE 9 MG/ML
INJECTION, SOLUTION INTRAVENOUS ONCE
Status: CANCELLED | OUTPATIENT
Start: 2025-01-01

## 2024-12-31 RX ORDER — POLYETHYLENE GLYCOL 3350 17 G/17G
17 POWDER, FOR SOLUTION ORAL DAILY PRN
Status: DISCONTINUED | OUTPATIENT
Start: 2024-12-31 | End: 2025-01-11 | Stop reason: HOSPADM

## 2024-12-31 RX ORDER — DULOXETIN HYDROCHLORIDE 20 MG/1
40 CAPSULE, DELAYED RELEASE ORAL DAILY
Status: DISCONTINUED | OUTPATIENT
Start: 2025-01-01 | End: 2025-01-11 | Stop reason: HOSPADM

## 2024-12-31 RX ORDER — GLUCAGON 1 MG
1 KIT INJECTION
Status: DISCONTINUED | OUTPATIENT
Start: 2024-12-31 | End: 2025-01-11 | Stop reason: HOSPADM

## 2024-12-31 RX ORDER — GLUCAGON 1 MG
1 KIT INJECTION
Status: DISCONTINUED | OUTPATIENT
Start: 2024-12-31 | End: 2025-01-01

## 2024-12-31 RX ORDER — ACETAMINOPHEN 500 MG
1000 TABLET ORAL
Status: COMPLETED | OUTPATIENT
Start: 2024-12-31 | End: 2024-12-31

## 2024-12-31 RX ORDER — SODIUM CHLORIDE 0.9 % (FLUSH) 0.9 %
5 SYRINGE (ML) INJECTION
Status: DISCONTINUED | OUTPATIENT
Start: 2024-12-31 | End: 2025-01-11 | Stop reason: HOSPADM

## 2024-12-31 RX ADMIN — ACETAMINOPHEN 1000 MG: 500 TABLET ORAL at 10:12

## 2024-12-31 RX ADMIN — IPRATROPIUM BROMIDE 2 SPRAY: 42 SPRAY, METERED NASAL at 10:12

## 2024-12-31 RX ADMIN — PIPERACILLIN SODIUM AND TAZOBACTAM SODIUM 4.5 G: 4; .5 INJECTION, POWDER, LYOPHILIZED, FOR SOLUTION INTRAVENOUS at 10:12

## 2024-12-31 RX ADMIN — VANCOMYCIN HYDROCHLORIDE 2500 MG: 1.25 INJECTION, POWDER, LYOPHILIZED, FOR SOLUTION INTRAVENOUS at 11:12

## 2024-12-31 RX ADMIN — INSULIN ASPART 2 UNITS: 100 INJECTION, SOLUTION INTRAVENOUS; SUBCUTANEOUS at 10:12

## 2024-12-31 RX ADMIN — PIPERACILLIN SODIUM AND TAZOBACTAM SODIUM 4.5 G: 4; .5 INJECTION, POWDER, LYOPHILIZED, FOR SOLUTION INTRAVENOUS at 05:12

## 2024-12-31 RX ADMIN — APIXABAN 5 MG: 5 TABLET, FILM COATED ORAL at 10:12

## 2024-12-31 NOTE — ASSESSMENT & PLAN NOTE
Chronic issue  Continue Cymbalta  Continue Robaxin TID PRN  Hold home prn Norco for now with acute encephalopathy

## 2024-12-31 NOTE — ASSESSMENT & PLAN NOTE
Not confused but slow to respond 2/2 questions, able to hold a conversation and answer questions appropriately  Likely 2/2 sepsis  Treatment of infection as above

## 2024-12-31 NOTE — ASSESSMENT & PLAN NOTE
Patient's blood pressure range in the last 24 hours was: BP  Min: 105/52  Max: 176/77.The patient's inpatient anti-hypertensive regimen is listed below:  Current Antihypertensives       Plan  - BP is controlled, no changes needed to their regimen  - As she took her morning BP meds and with sepsis, will hold on scheduled BP meds for now and restart when able

## 2024-12-31 NOTE — HPI
Ms. Cecile Bowen is a 74 y.o. female with ESRD on HD, T2DM, afib on Eliquis, HTN, obesity, history of breast cancer, chronic suprapubic catheter for neurogenic bladder and chronic back pain who presented to the [facility] ED 12/31 from Vascular Surgery for evaluation of fever and AMS.  History is obtained from patient.  She reports that over the last few days, she has been having fever and lethargy.  She went to see her Vascular Surgeon for right IJ PermCath removal, as her fistula has matured.  She is currently dialyzing only Mondays and Fridays via right IJ PermCath.  However, upon arrival to clinic she was febrile to 100.9F, slow to respond and tachy to 104.  She was sent to the ER for further evaluation.  At baseline, she uses a wheelchair.  She lives with her sister.  She currently endorses back pain that is not escalated from normal.  She is full code.  She took all of her medications prior to going to clinic except her Eliquis.     Upon arrival to the ER, vitals were temp 101.7F, , /77.  Labs showed WBC 20, Hgb 11.3, Sodium 129, BUN/Cr 28/3.2, Lactic 0.8.  UA was dirty.  Suprapubic catheter was exchanged in the ED.  CXR was negative.  She was given Vanc and Zosyn was admitted to Hospital Medicine for further management. Nephrology consulted for HD management.

## 2024-12-31 NOTE — TELEPHONE ENCOUNTER
Per pt's request nurse contacted pt's sister Kat to notify her that she has been brought to ED following appt with Dr. Friedman for general malaise, fever, hypertension, and tachycardia. Kat verbalized understanding.

## 2024-12-31 NOTE — NURSING
"Received pt from ED via stretcher. Pt w/ spontaneous non-labored breathing. Pt AAOx4. Pt on room air. Heart rhythm regular,  suprapubic cath in place, abdominal sounds active. Pt hooked to portable telemetry box, Pt denies pain, chest pain, SOB, dizziness and n/v. Noted blanchable redness on sacral area, cleansed w/ soap and water, applied skin barrier and protective foam applied. Edema on bilateral legs and feet, No skin breakdown on the heels noted, non-skid socks applied. Pt encouraged to turn to sides at least every 2 hours, pt acknowledged Pt oriented to room and call light,vitals signs stable fall risk reviewed and comfort measure utilized, bed is in the lowest position, pt verbalized understanding. Plan of care ongoing.   12/31/24 1712   Vital Signs   Temp 99.4 °F (37.4 °C)   Temp Source Oral   Pulse 81   Heart Rate Source Monitor   Resp 18   SpO2 98 %   /76   MAP (mmHg) 96   BP Location Right arm   BP Method Automatic   Patient Position Lying   Height and Weight   Height 5' 8" (1.727 m)   Height Method Stated   Weight 97.1 kg (214 lb)   Weight Method Bed Scale   BSA (Calculated - sq m) 2.16 sq meters   BMI (Calculated) 32.5   Weight in (lb) to have BMI = 25 164.1       "

## 2024-12-31 NOTE — ASSESSMENT & PLAN NOTE
Severe sepsis 2/2 CAUTI  On arrival febrile to 101.7F, tachycardic 102 with WBC 21 and slow to respond (encephalopathy)  Suprapubic catheter changed in the ED  Urine cx NGTD  Blood cx NGTD  Continue Zosyn:  Chart review shows history of Pseudomonas, as well as ESBL UTIs

## 2024-12-31 NOTE — ASSESSMENT & PLAN NOTE
Creatine stable for now. BMP reviewed- noted Estimated Creatinine Clearance: 19.4 mL/min (A) (based on SCr of 3.2 mg/dL (H)). according to latest data. Based on current GFR, CKD stage is end stage.  Monitor UOP and serial BMP and adjust therapy as needed. Renally dose meds. Avoid nephrotoxic medications and procedures.    Nephro consult  Has been getting HD MF via right IJ PermCath  Needs f/u with Vascular Surgery for IJ removal

## 2024-12-31 NOTE — CONSULTS
[facility] - Emergency Dept  Nephrology  Consult Note    Patient Name: Cecile Bowen  MRN: [id number]  Admission Date: 12/31/2024  Hospital Length of Stay: 0 days  Attending Provider: Fariha Abraham MD   Primary Care Physician: Lurdes Navarro MD  Principal Problem:Severe sepsis    Inpatient consult to Nephrology  Consult performed by: Jordyn Herrera PA-C  Consult ordered by: Fariha Abraham MD  Reason for consult: ESRD        Subjective:     HPI: Ms. Cecile Bowen is a 74 y.o. female with ESRD on HD, T2DM, afib on Eliquis, HTN, obesity, history of breast cancer, chronic suprapubic catheter for neurogenic bladder and chronic back pain who presented to the [facility] ED 12/31 from Vascular Surgery for evaluation of fever and AMS.  History is obtained from patient.  She reports that over the last few days, she has been having fever and lethargy.  She went to see her Vascular Surgeon for right IJ PermCath removal, as her fistula has matured.  She is currently dialyzing only Mondays and Fridays via right IJ PermCath.  However, upon arrival to clinic she was febrile to 100.9F, slow to respond and tachy to 104.  She was sent to the ER for further evaluation.  At baseline, she uses a wheelchair.  She lives with her sister.  She currently endorses back pain that is not escalated from normal.  She is full code.  She took all of her medications prior to going to clinic except her Eliquis.     Upon arrival to the ER, vitals were temp 101.7F, , /77.  Labs showed WBC 20, Hgb 11.3, Sodium 129, BUN/Cr 28/3.2, Lactic 0.8.  UA was dirty.  Suprapubic catheter was exchanged in the ED.  CXR was negative.  She was given Vanc and Zosyn was admitted to Hospital Medicine for further management. Nephrology consulted for HD management.     Past Medical History:   Diagnosis Date    Cervicogenic migraine     Chronic pain     CKD (chronic kidney disease) stage 4, GFR 15-29 ml/min     [facility]    CKD (chronic kidney  disease) stage 4, GFR 15-29 ml/min     Diabetes mellitus     Long term use of Insulin, Diabetic Neuropathy    Dialysis patient 1/21/2022    Fibromyalgia     Hydronephrosis     Hyperlipidemia     Hypertension 12/12/2012    Hypothyroidism 12/12/2012    ARON (iron deficiency anemia)     Insomnia     Levoscoliosis     Malignant neoplasm of upper-outer quadrant of left breast in female, estrogen receptor positive     Metabolic acidosis     Mobility impaired     Nuclear sclerosis - Both Eyes 3/24/2014    VIJAYA (obstructive sleep apnea)     Osteopenia     Pulmonary nodule     Recurrent UTI     Renal manifestation of secondary diabetes mellitus     Secondary hyperparathyroidism, renal     Urinary retention        Past Surgical History:   Procedure Laterality Date    ANGIOGRAPHY OF UPPER EXTREMITY N/A 9/19/2024    Procedure: Angiogram Extremity Bilateral;  Surgeon: RODRIGO Friedman III, MD;  Location: [facility] OR 2ND FLR;  Service: Vascular;  Laterality: N/A;  R femoral approach, arch & arm angiogram  168.76mgy  33.1145 gycm2  8.9min    AV FISTULA PLACEMENT Left 2/14/2024    Procedure: CREATION, AV FISTULA;  Surgeon: RODRIGO Friedman III, MD;  Location: [facility] OR 2ND FLR;  Service: Vascular;  Laterality: Left;  LUE AVF creation vs AVG insertion    BIOPSY OF URETER Left 2/18/2022    Procedure: BIOPSY, URETER;  Surgeon: Ronel Whittington MD;  Location: [facility] OR 1ST FLR;  Service: Urology;  Laterality: Left;    BREAST BIOPSY Right     benign    CHOLECYSTECTOMY      COLONOSCOPY N/A 1/13/2017    Procedure: COLONOSCOPY;  Surgeon: Morris Wiseman MD;  Location: [facility] ENDO (4TH FLR);  Service: Endoscopy;  Laterality: N/A;  Renal pt Nephrology advised to avoid phosphate preps    COLONOSCOPY N/A 12/7/2023    Procedure: COLONOSCOPY;  Surgeon: Herve Allen MD;  Location: [facility] ENDO (2ND FLR);  Service: Endoscopy;  Laterality: N/A;  HD MWF; labs prior  suprapubic catheter  pt does not ambulate-uses christina lift- will have sling with  her  9/7 ref. by Anastasiia Campbell DO, RIKKI, instr. mailed, Eliquis hold approval pending-[facility] to hold Eliquis 2 days per Dr Navarro-GT  1/30-precall complete-MS    CYSTOSCOPY N/A 10/8/2018    Procedure: CYSTOSCOPY;  Surgeon: Ronel Whittington MD;  Location: [facility] OR 1ST FLR;  Service: Urology;  Laterality: N/A;  45 min    CYSTOSCOPY N/A 3/25/2019    Procedure: CYSTOSCOPY;  Surgeon: Ronel Whittington MD;  Location: [facility] OR 1ST FLR;  Service: Urology;  Laterality: N/A;  45 min    CYSTOSCOPY N/A 8/26/2019    Procedure: CYSTOSCOPY;  Surgeon: Ronel Whittington MD;  Location: [facility] OR [facility] FLR;  Service: Urology;  Laterality: N/A;  45 min    CYSTOSCOPY N/A 7/2/2021    Procedure: CYSTOSCOPY;  Surgeon: Ronel Whittington MD;  [facility];  Service: Urology;  Laterality: N/A;    CYSTOSCOPY  12/23/2021    Procedure: CYSTOSCOPY;  Surgeon: Ronel Whittington MD;  [facility];  Service: Urology;;    CYSTOSCOPY  2/18/2022    Procedure: CYSTOSCOPY;  Surgeon: Ronel Whittington MD;  [facility];  Service: Urology;;    CYSTOSCOPY W/ URETERAL STENT PLACEMENT Left 5/15/2021    Procedure: CYSTOSCOPY, WITH URETERAL STENT INSERTION;  Surgeon: Levy Sánchez Jr., MD;  [facility];  Service: Urology;  Laterality: Left;    CYSTOSCOPY WITH BIOPSY OF BLADDER N/A 1/27/2020    Procedure: CYSTOSCOPY, WITH BLADDER BIOPSY, WITH FULGURATION IF INDICATED;  Surgeon: Ronel Whittington MD;  Location: [facility] OR [facility] FLR;  Service: Urology;  Laterality: N/A;    DILATION AND CURETTAGE OF UTERUS      FISTULOGRAM N/A 4/29/2024    Procedure: Fistulogram;  Surgeon: RODRIGO Friedman III, MD;  Location: [facility] CATH LAB;  Service: Peripheral Vascular;  Laterality: N/A;    GALLBLADDER SURGERY  2006    HYSTERECTOMY      INJECTION FOR SENTINEL NODE IDENTIFICATION Left 8/1/2019    Procedure: INJECTION, FOR SENTINEL NODE IDENTIFICATION;  Surgeon: Samia Fulton MD;  Location: [facility] OR  2ND FLR;  Service: General;  Laterality: Left;    INJECTION OF BOTULINUM TOXIN TYPE A N/A 10/8/2018    Procedure: INJECTION, BOTULINUM TOXIN, TYPE A 300 UNITS;  Surgeon: Ronel Whittington MD;  Location: [facility] OR 1ST FLR;  Service: Urology;  Laterality: N/A;    INJECTION OF BOTULINUM TOXIN TYPE A N/A 3/25/2019    Procedure: INJECTION, BOTULINUM TOXIN, TYPE A 300 UNITS;  Surgeon: Ronel Whittington MD;  [facility];  Service: Urology;  Laterality: N/A;    INJECTION OF BOTULINUM TOXIN TYPE A N/A 8/26/2019    Procedure: INJECTION, BOTULINUM TOXIN, TYPE A 300 UNITS;  Surgeon: Ronel Whittington MD;  [facility];  Service: Urology;  Laterality: N/A;    MASTECTOMY Left 8/1/2019    Procedure: MASTECTOMY 23 hour stay;  Surgeon: Samia Fulton MD;  Location: [facility] OR 2ND FLR;  Service: General;  Laterality: Left;    MEDIPORT REMOVAL Right 6/24/2022    Procedure: REMOVAL, CATHETER, CENTRAL VENOUS, TUNNELED, WITH PORT;  Surgeon: Isauro Anderson MD;  Location: [facility] CATH LAB;  Service: Radiology;  Laterality: Right;    OVARIAN CYST SURGERY  1985    PERCUTANEOUS TRANSLUMINAL ANGIOPLASTY OF ARTERIOVENOUS FISTULA Left 4/29/2024    Procedure: PTA, AV FISTULA;  Surgeon: RODRIGO Friemdan III, MD;  Location: [facility] CATH LAB;  Service: Peripheral Vascular;  Laterality: Left;    REPLACEMENT OF STENT Left 12/23/2021    Procedure: REPLACEMENT, STENT;  Surgeon: Ronel Whittington MD;  [facility];  Service: Urology;  Laterality: Left;    REPLACEMENT OF STENT Left 2/18/2022    Procedure: REPLACEMENT, STENT;  Surgeon: Ronel Whittington MD;  [facility];  Service: Urology;  Laterality: Left;    RETROGRADE PYELOGRAPHY Left 2/18/2022    Procedure: PYELOGRAM, RETROGRADE;  Surgeon: Ronel Whittington MD;  Location: [facility] OR [address] [place] [address];  Service: Urology;  Laterality: Left;    SENTINEL LYMPH NODE BIOPSY Left 8/1/2019    Procedure: BIOPSY, LYMPH NODE, SENTINEL;  Surgeon:  Samia Fulton MD;  Location: [facility] OR 2ND [facility];  Service: General;  Laterality: Left;    spt placement      TONSILLECTOMY, ADENOIDECTOMY      TOTAL ABDOMINAL HYSTERECTOMY W/ BILATERAL SALPINGOOPHORECTOMY  1985    URETEROSCOPY Left 2/18/2022    Procedure: URETEROSCOPY;  Surgeon: Ronel Whittington MD;  Location: [facility] OR 1ST [facility];  Service: Urology;  Laterality: Left;       Review of patient's allergies indicates:  No Known Allergies  Current Facility-Administered Medications   Medication Frequency    acetaminophen tablet 650 mg Q4H PRN    [START ON 1/1/2025] anastrozole tablet 1 mg Daily    apixaban tablet 5 mg BID    [START ON 1/1/2025] atorvastatin tablet 80 mg Daily    dextrose 50% injection 12.5 g PRN    dextrose 50% injection 12.5 g PRN    dextrose 50% injection 25 g PRN    dextrose 50% injection 25 g PRN    [START ON 1/1/2025] DULoxetine DR capsule 40 mg Daily    glucagon (human recombinant) injection 1 mg PRN    glucagon (human recombinant) injection 1 mg PRN    glucose chewable tablet 16 g PRN    glucose chewable tablet 16 g PRN    glucose chewable tablet 24 g PRN    glucose chewable tablet 24 g PRN    insulin aspart U-100 pen 0-5 Units QID (AC + HS) PRN    ipratropium 42 mcg (0.06 %) nasal spray 2 spray BID    [START ON 1/1/2025] levothyroxine tablet 50 mcg Before breakfast    methocarbamoL tablet 750 mg TID PRN    naloxone 0.4 mg/mL injection 0.02 mg PRN    ondansetron injection 8 mg Q6H PRN    [START ON 1/1/2025] oxybutynin 24 hr tablet 10 mg Daily    piperacillin-tazobactam (ZOSYN) 4.5 g in D5W 100 mL IVPB (MB+) Q8H    polyethylene glycol packet 17 g Daily PRN    [START ON 1/1/2025] sodium bicarbonate tablet 650 mg Daily    sodium chloride 0.9% flush 5 mL PRN    [START ON 1/1/2025] sodium zirconium cyclosilicate packet 5 g Daily    traZODone tablet 100 mg Nightly PRN     Current Outpatient Medications   Medication    ELIQUIS 5 mg Tab    acetaminophen (TYLENOL) 325 MG tablet    amLODIPine (NORVASC) 10 MG  "tablet    anastrozole (ARIMIDEX) 1 mg Tab    atorvastatin (LIPITOR) 80 MG tablet    BD INSULIN SYRINGE ULTRA-FINE 0.5 mL 31 gauge x 5/16" Syrg    calcium acetate,phosphat bind, (PHOSLO) 667 mg capsule    carvediloL (COREG) 12.5 MG tablet    diclofenac sodium (VOLTAREN) 1 % Gel    DULoxetine (CYMBALTA) 20 MG capsule    erenumab-aooe (AIMOVIG AUTOINJECTOR) 140 mg/mL autoinjector    ergocalciferol (ERGOCALCIFEROL) 50,000 unit Cap    furosemide (LASIX) 40 MG tablet    HYDROcodone-acetaminophen (NORCO)  mg per tablet    ipratropium (ATROVENT) 21 mcg (0.03 %) nasal spray    lactulose (CHRONULAC) 20 gram/30 mL Soln    LANTUS SOLOSTAR U-100 INSULIN 100 unit/mL (3 mL) InPn pen    losartan (COZAAR) 100 MG tablet    methocarbamoL (ROBAXIN) 750 MG Tab    ondansetron (ZOFRAN-ODT) 8 MG TbDL    oxybutynin (DITROPAN-XL) 10 MG 24 hr tablet    oxyCODONE (ROXICODONE) 5 MG immediate release tablet    patiromer calcium sorbitex (VELTASSA) 16.8 gram PwPk    pen needle, diabetic (BD ULTRA-FINE SHORT PEN NEEDLE) 31 gauge x 5/16" Ndle    silver sulfADIAZINE 1% (SILVADENE) 1 % cream    sodium bicarbonate 650 MG tablet    sumatriptan (IMITREX) 100 MG tablet    SYNTHROID 50 mcg tablet    traZODone (DESYREL) 100 MG tablet    UNABLE TO FIND    VELPHORO 500 mg Chew     Facility-Administered Medications Ordered in Other Encounters   Medication Frequency    epoetin chloe injection 2,000 Units 1 time in Clinic/HOD    heparin (porcine) injection 2,000 Units Once    heparin (porcine) injection 2,000 Units Once    heparin (porcine) injection 4,000 Units 1 time in Clinic/HOD    iron sucrose injection 100 mg 1 time in Clinic/HOD     Family History       Problem Relation (Age of Onset)    ALS Mother    Cancer Maternal Grandmother, Paternal Grandfather, Brother    Diabetes Sister, Maternal Aunt, Maternal Uncle    Kidney disease Sister, Maternal Aunt    Prostate cancer Brother    Scoliosis Brother          Tobacco Use    Smoking status: Never    " Smokeless tobacco: Never   Substance and Sexual Activity    Alcohol use: No     Alcohol/week: 0.0 standard drinks of alcohol    Drug use: No    Sexual activity: Not Currently     Birth control/protection: Abstinence     Review of Systems   Constitutional:  Positive for fever.   HENT:  Negative for congestion.    Respiratory:  Negative for shortness of breath.    Cardiovascular:  Negative for chest pain.   Gastrointestinal:  Negative for abdominal pain, nausea and vomiting.   Genitourinary:  Positive for decreased urine volume (ESRD).     Objective:     Vital Signs (Most Recent):  Temp: 99.1 °F (37.3 °C) (12/31/24 1148)  Pulse: 104 (12/31/24 1401)  Resp: 18 (12/31/24 1401)  BP: (!) 163/74 (12/31/24 1401)  SpO2: 98 % (12/31/24 1401) Vital Signs (24h Range):  Temp:  [99.1 °F (37.3 °C)-101.7 °F (38.7 °C)] 99.1 °F (37.3 °C)  Pulse:  [] 104  Resp:  [18-20] 18  SpO2:  [95 %-98 %] 98 %  BP: (122-176)/(59-77) 163/74     Weight: 98 kg (216 lb) (12/31/24 0909)  Body mass index is 32.84 kg/m².  Body surface area is 2.17 meters squared.    No intake/output data recorded.     Physical Exam  Vitals reviewed.   Constitutional:       Appearance: She is ill-appearing.   HENT:      Head: Normocephalic.   Eyes:      Conjunctiva/sclera: Conjunctivae normal.   Cardiovascular:      Rate and Rhythm: Tachycardia present.      Arteriovenous access: Left arteriovenous access is present.     Comments: LUE AVF + thrill  Pulmonary:      Effort: Pulmonary effort is normal. No respiratory distress.   Chest:      Comments: TDC R chest wall with no surrounding erythema  Abdominal:      Palpations: Abdomen is soft.      Tenderness: There is no abdominal tenderness.   Musculoskeletal:      Right lower leg: Edema present.      Left lower leg: Edema present.   Skin:     General: Skin is warm and dry.   Neurological:      Mental Status: She is alert.   Psychiatric:         Mood and Affect: Mood normal.          Significant Labs:  CBC:   Recent Labs    Lab 12/31/24  0937   WBC 20.72*   RBC 3.76*   HGB 11.3*   HCT 36.5*      MCV 97   MCH 30.1   MCHC 31.0*     CMP:   Recent Labs   Lab 12/31/24  0937   *   CALCIUM 8.9   ALBUMIN 3.0*   PROT 8.0   *   K 4.1   CO2 17*   CL 98   BUN 28*   CREATININE 3.2*   ALKPHOS 158*   ALT 12   AST 12   BILITOT 0.4     All labs within the past 24 hours have been reviewed.    Assessment/Plan:     Renal/  ESRD (end stage renal disease) on dialysis  74 year old female hx of ESRD on HD MWF presenting for AMS and fever, concern for sepsis.     Nephrology History  iHD Schedule: MF, now MWF per patient  Unit/MD: [facility]/Dr. Hare   Duration: 4 hours   EDW: 98 kg.   Access: JENNY AVF (has TDC scheduled to be removed)  Residual Renal Function: +++    Plan/Recommendations  -No need for emergent RRT, electrolytes and volume status stable. Plan to resume HD MWF tomorrow.   -Will continue iHD thrice weekly unless emergent indication arises  -Agree with removal of TDC if concern for bacteremia, patient dialyzes via LUE AVF  -Hgb goal 10-11, hgb at goal  -Strict I&Os  -Pre & post HD weight  -Check phos  -Renal diet if not NPO     ID  * Severe sepsis  -management per primary        Thank you for your consult. I will follow-up with patient. Please contact us if you have any additional questions.    Jordyn Herrera PA-C  Nephrology  [facility] - Emergency Dept

## 2024-12-31 NOTE — SUBJECTIVE & OBJECTIVE
Past Medical History:   Diagnosis Date    Cervicogenic migraine     Chronic pain     CKD (chronic kidney disease) stage 4, GFR 15-29 ml/min     [facility]    CKD (chronic kidney disease) stage 4, GFR 15-29 ml/min     Diabetes mellitus     Long term use of Insulin, Diabetic Neuropathy    Dialysis patient 1/21/2022    Fibromyalgia     Hydronephrosis     Hyperlipidemia     Hypertension 12/12/2012    Hypothyroidism 12/12/2012    ARON (iron deficiency anemia)     Insomnia     Levoscoliosis     Malignant neoplasm of upper-outer quadrant of left breast in female, estrogen receptor positive     Metabolic acidosis     Mobility impaired     Nuclear sclerosis - Both Eyes 3/24/2014    VIJAYA (obstructive sleep apnea)     Osteopenia     Pulmonary nodule     Recurrent UTI     Renal manifestation of secondary diabetes mellitus     Secondary hyperparathyroidism, renal     Urinary retention        Past Surgical History:   Procedure Laterality Date    ANGIOGRAPHY OF UPPER EXTREMITY N/A 9/19/2024    Procedure: Angiogram Extremity Bilateral;  Surgeon: RODRIGO Friedman III, MD;  [facility];  Service: Vascular;  Laterality: N/A;  R femoral approach, arch & arm angiogram  168.76mgy  33.1145 gycm2  8.9min    AV FISTULA PLACEMENT Left 2/14/2024    Procedure: CREATION, AV FISTULA;  Surgeon: RODRIGO Friedman III, MD;  Location: [facility] OR 2ND FLR;  Service: Vascular;  Laterality: Left;  LUE AVF creation vs AVG insertion    BIOPSY OF URETER Left 2/18/2022    Procedure: BIOPSY, URETER;  Surgeon: Ronel Whittington MD;  Location: [facility] OR 1ST FLR;  Service: Urology;  Laterality: Left;    BREAST BIOPSY Right     benign    CHOLECYSTECTOMY      COLONOSCOPY N/A 1/13/2017    Procedure: COLONOSCOPY;  Surgeon: Morris Wiseman MD;  Location: [facility] ENDO (4TH FLR);  Service: Endoscopy;  Laterality: N/A;  Renal pt Nephrology advised to avoid phosphate preps    COLONOSCOPY N/A 12/7/2023    Procedure: COLONOSCOPY;  Surgeon: Herve Allen  MD TIM;  Location: [facility] ENDO (2ND FLR);  Service: Endoscopy;  Laterality: N/A;  HD MWF; labs prior  suprapubic catheter  pt does not ambulate-uses christina lift- will have sling with her  9/7 ref. by Anastasiia Campbell DO, PEG, instr. mailed, Eliquis hold approval pending-st  ok to hold Eliquis 2 days per Dr Navarro-GT  1/30-precall complete-MS    CYSTOSCOPY N/A 10/8/2018    Procedure: CYSTOSCOPY;  Surgeon: Ronel Whittington MD;  Location: [facility] OR 1ST FLR;  Service: Urology;  Laterality: N/A;  45 min    CYSTOSCOPY N/A 3/25/2019    Procedure: CYSTOSCOPY;  Surgeon: Ronel Whittington MD;  Location: [facility] OR 1ST FLR;  Service: Urology;  Laterality: N/A;  45 min    CYSTOSCOPY N/A 8/26/2019    Procedure: CYSTOSCOPY;  Surgeon: Ronel Whittington MD;  [facility];  Service: Urology;  Laterality: N/A;  45 min    CYSTOSCOPY N/A 7/2/2021    Procedure: CYSTOSCOPY;  Surgeon: Ronel Whittington MD;  [facility];  Service: Urology;  Laterality: N/A;    CYSTOSCOPY  12/23/2021    Procedure: CYSTOSCOPY;  Surgeon: Ronel Whittington MD;  [facility];  Service: Urology;;    CYSTOSCOPY  2/18/2022    Procedure: CYSTOSCOPY;  Surgeon: Ronel Whittington MD;  [facility];  Service: Urology;;    CYSTOSCOPY W/ URETERAL STENT PLACEMENT Left 5/15/2021    Procedure: CYSTOSCOPY, WITH URETERAL STENT INSERTION;  Surgeon: Levy Sánchez Jr., MD;  Location: [facility] OR [facility] FLR;  Service: Urology;  Laterality: Left;    CYSTOSCOPY WITH BIOPSY OF BLADDER N/A 1/27/2020    Procedure: CYSTOSCOPY, WITH BLADDER BIOPSY, WITH FULGURATION IF INDICATED;  Surgeon: Ronel Whittington MD;  Location: [facility] OR [facility] FLR;  Service: Urology;  Laterality: N/A;    DILATION AND CURETTAGE OF UTERUS      FISTULOGRAM N/A 4/29/2024    Procedure: Fistulogram;  Surgeon: RODRIGO Friedman III, MD;  Location: [facility] CATH LAB;  Service: Peripheral Vascular;  Laterality: N/A;    GALLBLADDER SURGERY  2006     HYSTERECTOMY      INJECTION FOR SENTINEL NODE IDENTIFICATION Left 8/1/2019    Procedure: INJECTION, FOR SENTINEL NODE IDENTIFICATION;  Surgeon: Samia Fulton MD;  Location: [facility] OR 2ND FLR;  Service: General;  Laterality: Left;    INJECTION OF BOTULINUM TOXIN TYPE A N/A 10/8/2018    Procedure: INJECTION, BOTULINUM TOXIN, TYPE A 300 UNITS;  Surgeon: Ronel Whittington MD;  [facility];  Service: Urology;  Laterality: N/A;    INJECTION OF BOTULINUM TOXIN TYPE A N/A 3/25/2019    Procedure: INJECTION, BOTULINUM TOXIN, TYPE A 300 UNITS;  Surgeon: Ronel Whittington MD;  [facility];  Service: Urology;  Laterality: N/A;    INJECTION OF BOTULINUM TOXIN TYPE A N/A 8/26/2019    Procedure: INJECTION, BOTULINUM TOXIN, TYPE A 300 UNITS;  Surgeon: Ronel Whittington MD;  Location: [facility] OR [address] [place] [address];  Service: Urology;  Laterality: N/A;    MASTECTOMY Left 8/1/2019    Procedure: MASTECTOMY 23 hour stay;  Surgeon: Samia Fulton MD;  Location: [facility] OR 2ND FLR;  Service: General;  Laterality: Left;    MEDIPORT REMOVAL Right 6/24/2022    Procedure: REMOVAL, CATHETER, CENTRAL VENOUS, TUNNELED, WITH PORT;  Surgeon: Isauro Anderson MD;  Location: [facility] CATH LAB;  Service: Radiology;  Laterality: Right;    OVARIAN CYST SURGERY  1985    PERCUTANEOUS TRANSLUMINAL ANGIOPLASTY OF ARTERIOVENOUS FISTULA Left 4/29/2024    Procedure: PTA, AV FISTULA;  Surgeon: RODRIGO Friedman III, MD;  Location: [facility] CATH LAB;  Service: Peripheral Vascular;  Laterality: Left;    REPLACEMENT OF STENT Left 12/23/2021    Procedure: REPLACEMENT, STENT;  Surgeon: Ronel Whittington MD;  [facility];  Service: Urology;  Laterality: Left;    REPLACEMENT OF STENT Left 2/18/2022    Procedure: REPLACEMENT, STENT;  Surgeon: Ronel Whittington MD;  Location: [facility] OR [address] [place] [address];  Service: Urology;  Laterality: Left;    RETROGRADE PYELOGRAPHY Left 2/18/2022    Procedure: PYELOGRAM, RETROGRADE;  Surgeon: Ronel FRAZIER  "MD Pk;  Location: [facility] OR [address] [place] [address];  Service: Urology;  Laterality: Left;    SENTINEL LYMPH NODE BIOPSY Left 8/1/2019    Procedure: BIOPSY, LYMPH NODE, SENTINEL;  Surgeon: Samia Fulton MD;  Location: [facility] OR 2ND FLR;  Service: General;  Laterality: Left;    spt placement      TONSILLECTOMY, ADENOIDECTOMY      TOTAL ABDOMINAL HYSTERECTOMY W/ BILATERAL SALPINGOOPHORECTOMY  1985    URETEROSCOPY Left 2/18/2022    Procedure: URETEROSCOPY;  Surgeon: Ronel Whittington MD;  Location: [facility] OR [address] [place] [address];  Service: Urology;  Laterality: Left;       Review of patient's allergies indicates:  No Known Allergies    Current Facility-Administered Medications on File Prior to Encounter   Medication    epoetin chloe injection 2,000 Units    heparin (porcine) injection 2,000 Units    heparin (porcine) injection 2,000 Units    heparin (porcine) injection 4,000 Units    iron sucrose injection 100 mg     Current Outpatient Medications on File Prior to Encounter   Medication Sig    ELIQUIS 5 mg Tab TAKE 1 TABLET BY MOUTH TWICE A DAY    acetaminophen (TYLENOL) 325 MG tablet Take 2 tablets (650 mg total) by mouth every 6 (six) hours as needed for Pain.    amLODIPine (NORVASC) 10 MG tablet Take 10 mg by mouth.    anastrozole (ARIMIDEX) 1 mg Tab TAKE 1 TABLET BY MOUTH EVERY DAY    atorvastatin (LIPITOR) 80 MG tablet Take 1 tablet (80 mg total) by mouth once daily.    BD INSULIN SYRINGE ULTRA-FINE 0.5 mL 31 gauge x 5/16" Syrg USE WITH INSULIN 4 TIMES A DAY    calcium acetate,phosphat bind, (PHOSLO) 667 mg capsule Take 667 mg by mouth 3 (three) times daily with meals.    carvediloL (COREG) 12.5 MG tablet Take 1 tablet (12.5 mg total) by mouth 2 (two) times daily.    diclofenac sodium (VOLTAREN) 1 % Gel Apply 2 g topically once daily.    DULoxetine (CYMBALTA) 20 MG capsule Take 2 capsules (40 mg total) by mouth once daily.    erenumab-aooe (AIMOVIG AUTOINJECTOR) 140 mg/mL autoinjector 140 mg MONTHLY (route: subcutaneous)    " "ergocalciferol (ERGOCALCIFEROL) 50,000 unit Cap Take 1 capsule (50,000 Units total) by mouth every 7 days.    furosemide (LASIX) 40 MG tablet Take 1 tablet (40 mg total) by mouth once daily.    HYDROcodone-acetaminophen (NORCO)  mg per tablet Take 1 tablet by mouth every 6 (six) hours as needed for Pain.    ipratropium (ATROVENT) 21 mcg (0.03 %) nasal spray 2 sprays by Each Nostril route 2 (two) times daily.    lactulose (CHRONULAC) 20 gram/30 mL Soln Take 30 mLs (20 g total) by mouth every 12 (twelve) hours as needed (for constipation).    LANTUS SOLOSTAR U-100 INSULIN 100 unit/mL (3 mL) InPn pen INJECT 12-15 UNITS UNDER THE SKIN at night    losartan (COZAAR) 100 MG tablet Take 1 tablet (100 mg total) by mouth once daily.    methocarbamoL (ROBAXIN) 750 MG Tab TAKE 1 TO 2 TABLETS(750 TO 1500 MG) BY MOUTH THREE TIMES DAILY AS NEEDED FOR MUSCLE SPASMS    ondansetron (ZOFRAN-ODT) 8 MG TbDL 8 mg EVERY 12 HOURS (route: oral)    oxybutynin (DITROPAN-XL) 10 MG 24 hr tablet TAKE 1 TABLET BY MOUTH EVERY DAY    oxyCODONE (ROXICODONE) 5 MG immediate release tablet Take 1 tablet (5 mg total) by mouth daily as needed (severe pain).    patiromer calcium sorbitex (VELTASSA) 16.8 gram PwPk Take 1 packet (16.8 g total) by mouth once daily. for 90 doses    pen needle, diabetic (BD ULTRA-FINE SHORT PEN NEEDLE) 31 gauge x 5/16" Ndle USE WITH LANTUS DAILY, e 11.65    silver sulfADIAZINE 1% (SILVADENE) 1 % cream Apply topically Every 3 (three) days.    sodium bicarbonate 650 MG tablet Take 1 tablet (650 mg total) by mouth once daily.    sumatriptan (IMITREX) 100 MG tablet Take 1 tablet (100 mg total) by mouth every 2 (two) hours as needed for Migraine (Limit 2 in 14 hours and 9 in one month).    SYNTHROID 50 mcg tablet TAKE 1 TABLET BY MOUTH BEFORE BREAKFAST. ADMINISTER ON AN EMPTY STOMACH AT LEAST 30 MINUTES BEFORE FOOD. IF RECEIVING TUBE FEEDS, HOLD TUBE FEEDS FOR 1 HOUR BEFORE AND AFTER LEVOTHYROXINE ADMINISTRATION.    traZODone " (DESYREL) 100 MG tablet TAKE 1 TABLET BY MOUTH NIGHTLY AS NEEDED FOR INSOMNIA.    UNABLE TO FIND medication name: Tylenol Migraine- APAP, ASA, caffeine    VELPHORO 500 mg Chew CHEW INSERT TABLET 5 TIMES DAILY WITH MEAL AND SNACK     Family History       Problem Relation (Age of Onset)    ALS Mother    Cancer Maternal Grandmother, Paternal Grandfather, Brother    Diabetes Sister, Maternal Aunt, Maternal Uncle    Kidney disease Sister, Maternal Aunt    Prostate cancer Brother    Scoliosis Brother          Tobacco Use    Smoking status: Never    Smokeless tobacco: Never   Substance and Sexual Activity    Alcohol use: No     Alcohol/week: 0.0 standard drinks of alcohol    Drug use: No    Sexual activity: Not Currently     Birth control/protection: Abstinence     Review of Systems   Constitutional:  Positive for activity change, fatigue and fever. Negative for chills.   HENT:  Negative for sore throat and trouble swallowing.    Eyes:  Negative for photophobia and visual disturbance.   Respiratory:  Negative for cough and shortness of breath.    Cardiovascular:  Negative for chest pain, palpitations and leg swelling.   Gastrointestinal:  Negative for abdominal pain, constipation, diarrhea, nausea and vomiting.   Endocrine: Negative for cold intolerance and heat intolerance.   Genitourinary:  Negative for dysuria and frequency.   Musculoskeletal:  Positive for back pain. Negative for arthralgias and myalgias.   Skin:  Negative for rash and wound.   Neurological:  Positive for weakness. Negative for dizziness, syncope and light-headedness.   Psychiatric/Behavioral:  Negative for confusion and hallucinations.    All other systems reviewed and are negative.    Objective:     Vital Signs (Most Recent):  Temp: 99.1 °F (37.3 °C) (12/31/24 1148)  Pulse: 100 (12/31/24 1148)  Resp: 20 (12/31/24 0909)  BP: (!) 122/59 (12/31/24 1148)  SpO2: 96 % (12/31/24 1148) Vital Signs (24h Range):  Temp:  [99.1 °F (37.3 °C)-101.7 °F (38.7 °C)]  "99.1 °F (37.3 °C)  Pulse:  [] 100  Resp:  [20] 20  SpO2:  [96 %-98 %] 96 %  BP: (105-176)/(52-99) 122/59     Weight: 98 kg (216 lb)  Body mass index is 32.84 kg/m².     Physical Exam  Vitals and nursing note reviewed.   Constitutional:       Appearance: Normal appearance. She is obese.   HENT:      Head: Normocephalic and atraumatic.   Eyes:      General: No scleral icterus.  Neck:      Comments: Right tunneled PermCath  Cardiovascular:      Rate and Rhythm: Normal rate and regular rhythm.      Heart sounds: No murmur heard.  Pulmonary:      Effort: Pulmonary effort is normal. No respiratory distress.      Breath sounds: Normal breath sounds. No wheezing.   Abdominal:      General: Bowel sounds are normal. There is no distension.      Palpations: Abdomen is soft.      Tenderness: There is no abdominal tenderness.   Genitourinary:     Comments: Suprapubic catheter  Musculoskeletal:         General: Normal range of motion.      Cervical back: Normal range of motion and neck supple.   Skin:     General: Skin is warm and dry.      Coloration: Skin is pale.   Neurological:      General: No focal deficit present.      Mental Status: She is alert and oriented to person, place, and time. Mental status is at baseline.      Comments: Slow to respond, but answers all questions appropriately and able to hold a conversation.                Significant Labs: All pertinent labs within the past 24 hours have been reviewed.  Blood Culture: No results for input(s): "LABBLOO" in the last 48 hours.  CBC:   Recent Labs   Lab 12/31/24  0937   WBC 20.72*   HGB 11.3*   HCT 36.5*        CMP:   Recent Labs   Lab 12/31/24  0937   *   K 4.1   CL 98   CO2 17*   *   BUN 28*   CREATININE 3.2*   CALCIUM 8.9   PROT 8.0   ALBUMIN 3.0*   BILITOT 0.4   ALKPHOS 158*   AST 12   ALT 12   ANIONGAP 14     Urine Culture: No results for input(s): "LABURIN" in the last 48 hours.    Significant Imaging: I have reviewed all pertinent " imaging results/findings within the past 24 hours.

## 2024-12-31 NOTE — ASSESSMENT & PLAN NOTE
Patient has paroxysmal (<7 days) atrial fibrillation. Patient is currently in sinus rhythm. [id number] Score: 3. The patients heart rate in the last 24 hours is as follows:  Pulse  Min: 52  Max: 104     Antiarrhythmics       Anticoagulants  apixaban tablet 5 mg, 2 times daily, Oral    Plan  - Replete lytes with a goal of K>4, Mg >2  - Patient is anticoagulated, see medications listed above.  - Patient's afib is currently controlled  - Previously had afib, and on Eliquis once a day because of bruising?  As she is being monitored will put on appropriate BID dosing

## 2024-12-31 NOTE — ASSESSMENT & PLAN NOTE
HbA1c 6.2, sugars controlled  Home DM regimen:  Largine 15 qHS  Hold scheduled insulin for now with AMS and decreased oral intake  SSI with POCT accuchecks to achieve blood glucose of 140-180 while hospitalized  Hypoglycemia protocol  Diabetic diet

## 2024-12-31 NOTE — ASSESSMENT & PLAN NOTE
Patient has recurrent depression which is moderate and is currently controlled. Will Continue anti-depressant medications. We will not consult psychiatry at this time. Patient does not display psychosis at this time. Continue to monitor closely and adjust plan of care as needed.      Chronic and stable  Continue Cymbalta

## 2024-12-31 NOTE — ASSESSMENT & PLAN NOTE
74 year old female hx of ESRD on HD MWF presenting for AMS and fever, concern for sepsis.     Nephrology History  iHD Schedule: MF, now MWF per patient  Unit/MD: [facility]/Dr. Hare   Duration: 4 hours   EDW: 98 kg.   Access: JENNY AVF (has TDC scheduled to be removed)  Residual Renal Function: +++    Plan/Recommendations  -No need for emergent RRT, electrolytes and volume status stable. Plan to resume HD MWF tomorrow.   -Will continue iHD thrice weekly unless emergent indication arises  -Agree with removal of TDC if concern for bacteremia, patient dialyzes via LUE AVF  -Hgb goal 10-11, hgb at goal  -Strict I&Os  -Pre & post HD weight  -Check phos  -Renal diet if not NPO

## 2024-12-31 NOTE — ED NOTES
Pt cleaned of fecal incontinence + placed on new jeannine pads. Repositioned in bed. Provided w/ warm blankets + pillow. Fall risk + limb alert bracelets placed to LUE. Personal belongings placed in belongings bag + labeled at bedside.

## 2024-12-31 NOTE — H&P
[facility] - Emergency Dept  Hospital Medicine  History & Physical    Patient Name: Cecile Bowen  MRN: [id number]  Patient Class: IP- Inpatient  Admission Date: 12/31/2024  Attending Physician: Fariha Abraham MD   Primary Care Provider: Lurdes Navarro MD         Patient information was obtained from patient, past medical records, and ER records.     Subjective:     Principal Problem:Severe sepsis    Chief Complaint:   Chief Complaint   Patient presents with    Multiple complaints     Sent from [facility] clinic , was suppose to get perm cath out of r chest, fever, has suprapubic cath        HPI: Ms. Cecile Bowen is a 74 y.o. female with ESRD on HD, T2DM, afib on Eliquis, HTN, obesity, history of breast cancer, chronic suprapubic catheter for neurogenic bladder and chronic back pain who presented to the [facility] ED 12/31 from Vascular Surgery for evaluation of fever and AMS.  History is obtained from patient.  She reports that over the last few days, she has been having fever and lethargy.  She went to see her Vascular Surgeon for right IJ PermCath removal, as her fistula has matured.  She is currently dialyzing only Mondays and Fridays via right IJ PermCath.  However, upon arrival to clinic she was febrile to 100.9F, slow to respond and tachy to 104.  She was sent to the ER for further evaluation.  At baseline, she uses a wheelchair.  She lives with her sister.  She currently endorses back pain that is not escalated from normal.  She is full code.  She took all of her medications prior to going to clinic except her Eliquis.    Upon arrival to the ER, vitals were temp 101.7F, , /77.  Labs showed WBC 20, Hgb 11.3, Sodium 129, BUN/Cr 28/3.2, Lactic 0.8.  UA was dirty.  Suprapubic catheter was exchanged in the ED.  CXR was negative.  She was given Vanc and Zosyn was admitted to Hospital Medicine for further management.    Past Medical History:   Diagnosis Date    Cervicogenic migraine     Chronic  pain     CKD (chronic kidney disease) stage 4, GFR 15-29 ml/min     [facility]    CKD (chronic kidney disease) stage 4, GFR 15-29 ml/min     Diabetes mellitus     Long term use of Insulin, Diabetic Neuropathy    Dialysis patient 1/21/2022    Fibromyalgia     Hydronephrosis     Hyperlipidemia     Hypertension 12/12/2012    Hypothyroidism 12/12/2012    ARON (iron deficiency anemia)     Insomnia     Levoscoliosis     Malignant neoplasm of upper-outer quadrant of left breast in female, estrogen receptor positive     Metabolic acidosis     Mobility impaired     Nuclear sclerosis - Both Eyes 3/24/2014    VIJAYA (obstructive sleep apnea)     Osteopenia     Pulmonary nodule     Recurrent UTI     Renal manifestation of secondary diabetes mellitus     Secondary hyperparathyroidism, renal     Urinary retention        Past Surgical History:   Procedure Laterality Date    ANGIOGRAPHY OF UPPER EXTREMITY N/A 9/19/2024    Procedure: Angiogram Extremity Bilateral;  Surgeon: RODRIGO Friedman III, MD;  Location: [facility] OR 2ND FLR;  Service: Vascular;  Laterality: N/A;  R femoral approach, arch & arm angiogram  168.76mgy  33.1145 gycm2  8.9min    AV FISTULA PLACEMENT Left 2/14/2024    Procedure: CREATION, AV FISTULA;  Surgeon: RODRIGO Friedman III, MD;  Location: [facility] OR 2ND FLR;  Service: Vascular;  Laterality: Left;  LUE AVF creation vs AVG insertion    BIOPSY OF URETER Left 2/18/2022    Procedure: BIOPSY, URETER;  Surgeon: Ronel Whittington MD;  Location: [facility] OR 1ST FLR;  Service: Urology;  Laterality: Left;    BREAST BIOPSY Right     benign    CHOLECYSTECTOMY      COLONOSCOPY N/A 1/13/2017    Procedure: COLONOSCOPY;  Surgeon: Morris Wiseman MD;  Location: [facility] ENDO (4TH FLR);  Service: Endoscopy;  Laterality: N/A;  Renal pt Nephrology advised to avoid phosphate preps    COLONOSCOPY N/A 12/7/2023    Procedure: COLONOSCOPY;  Surgeon: Herve Allen MD;  Location: [facility] ENDO (2ND FLR);  Service: Endoscopy;  Laterality: N/A;   HD MWF; labs prior  suprapubic catheter  pt does not ambulate-uses christina lift- will have sling with her  9/7 ref. by Anastasiia Cmapbell, , PEG, instr. mailed, Eliquis hold approval pending-[facility] to hold Eliquis 2 days per Dr Navarro-GT  1/30-precall complete-MS    CYSTOSCOPY N/A 10/8/2018    Procedure: CYSTOSCOPY;  Surgeon: Ronel Whittington MD;  Location: [facility] OR 1ST FLR;  Service: Urology;  Laterality: N/A;  45 min    CYSTOSCOPY N/A 3/25/2019    Procedure: CYSTOSCOPY;  Surgeon: Ronel Whittington MD;  Location: [facility] OR [facility] FLR;  Service: Urology;  Laterality: N/A;  45 min    CYSTOSCOPY N/A 8/26/2019    Procedure: CYSTOSCOPY;  Surgeon: Ronel Whittington MD;  Location: [facility] OR [facility] FLR;  Service: Urology;  Laterality: N/A;  45 min    CYSTOSCOPY N/A 7/2/2021    Procedure: CYSTOSCOPY;  Surgeon: Ronel Whittington MD;  [facility];  Service: Urology;  Laterality: N/A;    CYSTOSCOPY  12/23/2021    Procedure: CYSTOSCOPY;  Surgeon: Ronel Whittington MD;  [facility];  Service: Urology;;    CYSTOSCOPY  2/18/2022    Procedure: CYSTOSCOPY;  Surgeon: Ronel Whittington MD;  [facility];  Service: Urology;;    CYSTOSCOPY W/ URETERAL STENT PLACEMENT Left 5/15/2021    Procedure: CYSTOSCOPY, WITH URETERAL STENT INSERTION;  Surgeon: Levy Sánchez Jr., MD;  Location: [facility] OR [facility] FLR;  Service: Urology;  Laterality: Left;    CYSTOSCOPY WITH BIOPSY OF BLADDER N/A 1/27/2020    Procedure: CYSTOSCOPY, WITH BLADDER BIOPSY, WITH FULGURATION IF INDICATED;  Surgeon: Ronel Whittington MD;  [facility];  Service: Urology;  Laterality: N/A;    DILATION AND CURETTAGE OF UTERUS      FISTULOGRAM N/A 4/29/2024    Procedure: Fistulogram;  Surgeon: RODRIGO Friedman III, MD;  Location: [facility] CATH LAB;  Service: Peripheral Vascular;  Laterality: N/A;    GALLBLADDER SURGERY  2006    HYSTERECTOMY      INJECTION FOR SENTINEL NODE IDENTIFICATION Left 8/1/2019     Procedure: INJECTION, FOR SENTINEL NODE IDENTIFICATION;  Surgeon: Samia Fulton MD;  Location: [facility] OR 2ND FLR;  Service: General;  Laterality: Left;    INJECTION OF BOTULINUM TOXIN TYPE A N/A 10/8/2018    Procedure: INJECTION, BOTULINUM TOXIN, TYPE A 300 UNITS;  Surgeon: Ronel Whittington MD;  [facility];  Service: Urology;  Laterality: N/A;    INJECTION OF BOTULINUM TOXIN TYPE A N/A 3/25/2019    Procedure: INJECTION, BOTULINUM TOXIN, TYPE A 300 UNITS;  Surgeon: Ronel Whittington MD;  Location: [facility] OR [address] [place] [address];  Service: Urology;  Laterality: N/A;    INJECTION OF BOTULINUM TOXIN TYPE A N/A 8/26/2019    Procedure: INJECTION, BOTULINUM TOXIN, TYPE A 300 UNITS;  Surgeon: Ronel Whittington MD;  Location: [facility] OR [address] [place] [address];  Service: Urology;  Laterality: N/A;    MASTECTOMY Left 8/1/2019    Procedure: MASTECTOMY 23 hour stay;  Surgeon: Samia Fulton MD;  Location: [facility] OR 2ND [facility];  Service: General;  Laterality: Left;    MEDIPORT REMOVAL Right 6/24/2022    Procedure: REMOVAL, CATHETER, CENTRAL VENOUS, TUNNELED, WITH PORT;  Surgeon: Isauro Anderson MD;  Location: [facility] CATH LAB;  Service: Radiology;  Laterality: Right;    OVARIAN CYST SURGERY  1985    PERCUTANEOUS TRANSLUMINAL ANGIOPLASTY OF ARTERIOVENOUS FISTULA Left 4/29/2024    Procedure: PTA, AV FISTULA;  Surgeon: RODRIGO Friedman III, MD;  Location: [facility] CATH LAB;  Service: Peripheral Vascular;  Laterality: Left;    REPLACEMENT OF STENT Left 12/23/2021    Procedure: REPLACEMENT, STENT;  Surgeon: Ronel Whittington MD;  Location: [facility] OR [address] [place] [address];  Service: Urology;  Laterality: Left;    REPLACEMENT OF STENT Left 2/18/2022    Procedure: REPLACEMENT, STENT;  Surgeon: Ronel Whittington MD;  Location: [facility] OR [address] [place] [address];  Service: Urology;  Laterality: Left;    RETROGRADE PYELOGRAPHY Left 2/18/2022    Procedure: PYELOGRAM, RETROGRADE;  Surgeon: Ronel Whittington MD;  Location: [facility] OR [address] [place] [address];  Service: Urology;  Laterality:  "Left;    SENTINEL LYMPH NODE BIOPSY Left 8/1/2019    Procedure: BIOPSY, LYMPH NODE, SENTINEL;  Surgeon: Samia Fulton MD;  Location: [facility] OR 2ND FLR;  Service: General;  Laterality: Left;    spt placement      TONSILLECTOMY, ADENOIDECTOMY      TOTAL ABDOMINAL HYSTERECTOMY W/ BILATERAL SALPINGOOPHORECTOMY  1985    URETEROSCOPY Left 2/18/2022    Procedure: URETEROSCOPY;  Surgeon: Ronel Whittington MD;  Location: [facility] OR 1ST FLR;  Service: Urology;  Laterality: Left;       Review of patient's allergies indicates:  No Known Allergies    Current Facility-Administered Medications on File Prior to Encounter   Medication    epoetin chloe injection 2,000 Units    heparin (porcine) injection 2,000 Units    heparin (porcine) injection 2,000 Units    heparin (porcine) injection 4,000 Units    iron sucrose injection 100 mg     Current Outpatient Medications on File Prior to Encounter   Medication Sig    ELIQUIS 5 mg Tab TAKE 1 TABLET BY MOUTH TWICE A DAY    acetaminophen (TYLENOL) 325 MG tablet Take 2 tablets (650 mg total) by mouth every 6 (six) hours as needed for Pain.    amLODIPine (NORVASC) 10 MG tablet Take 10 mg by mouth.    anastrozole (ARIMIDEX) 1 mg Tab TAKE 1 TABLET BY MOUTH EVERY DAY    atorvastatin (LIPITOR) 80 MG tablet Take 1 tablet (80 mg total) by mouth once daily.    BD INSULIN SYRINGE ULTRA-FINE 0.5 mL 31 gauge x 5/16" Syrg USE WITH INSULIN 4 TIMES A DAY    calcium acetate,phosphat bind, (PHOSLO) 667 mg capsule Take 667 mg by mouth 3 (three) times daily with meals.    carvediloL (COREG) 12.5 MG tablet Take 1 tablet (12.5 mg total) by mouth 2 (two) times daily.    diclofenac sodium (VOLTAREN) 1 % Gel Apply 2 g topically once daily.    DULoxetine (CYMBALTA) 20 MG capsule Take 2 capsules (40 mg total) by mouth once daily.    erenumab-aooe (AIMOVIG AUTOINJECTOR) 140 mg/mL autoinjector 140 mg MONTHLY (route: subcutaneous)    ergocalciferol (ERGOCALCIFEROL) 50,000 unit Cap Take 1 capsule (50,000 Units total) by " "mouth every 7 days.    furosemide (LASIX) 40 MG tablet Take 1 tablet (40 mg total) by mouth once daily.    HYDROcodone-acetaminophen (NORCO)  mg per tablet Take 1 tablet by mouth every 6 (six) hours as needed for Pain.    ipratropium (ATROVENT) 21 mcg (0.03 %) nasal spray 2 sprays by Each Nostril route 2 (two) times daily.    lactulose (CHRONULAC) 20 gram/30 mL Soln Take 30 mLs (20 g total) by mouth every 12 (twelve) hours as needed (for constipation).    LANTUS SOLOSTAR U-100 INSULIN 100 unit/mL (3 mL) InPn pen INJECT 12-15 UNITS UNDER THE SKIN at night    losartan (COZAAR) 100 MG tablet Take 1 tablet (100 mg total) by mouth once daily.    methocarbamoL (ROBAXIN) 750 MG Tab TAKE 1 TO 2 TABLETS(750 TO 1500 MG) BY MOUTH THREE TIMES DAILY AS NEEDED FOR MUSCLE SPASMS    ondansetron (ZOFRAN-ODT) 8 MG TbDL 8 mg EVERY 12 HOURS (route: oral)    oxybutynin (DITROPAN-XL) 10 MG 24 hr tablet TAKE 1 TABLET BY MOUTH EVERY DAY    oxyCODONE (ROXICODONE) 5 MG immediate release tablet Take 1 tablet (5 mg total) by mouth daily as needed (severe pain).    patiromer calcium sorbitex (VELTASSA) 16.8 gram PwPk Take 1 packet (16.8 g total) by mouth once daily. for 90 doses    pen needle, diabetic (BD ULTRA-FINE SHORT PEN NEEDLE) 31 gauge x 5/16" Ndle USE WITH LANTUS DAILY, e 11.65    silver sulfADIAZINE 1% (SILVADENE) 1 % cream Apply topically Every 3 (three) days.    sodium bicarbonate 650 MG tablet Take 1 tablet (650 mg total) by mouth once daily.    sumatriptan (IMITREX) 100 MG tablet Take 1 tablet (100 mg total) by mouth every 2 (two) hours as needed for Migraine (Limit 2 in 14 hours and 9 in one month).    SYNTHROID 50 mcg tablet TAKE 1 TABLET BY MOUTH BEFORE BREAKFAST. ADMINISTER ON AN EMPTY STOMACH AT LEAST 30 MINUTES BEFORE FOOD. IF RECEIVING TUBE FEEDS, HOLD TUBE FEEDS FOR 1 HOUR BEFORE AND AFTER LEVOTHYROXINE ADMINISTRATION.    traZODone (DESYREL) 100 MG tablet TAKE 1 TABLET BY MOUTH NIGHTLY AS NEEDED FOR INSOMNIA.    " UNABLE TO FIND medication name: Tylenol Migraine- APAP, ASA, caffeine    VELPHORO 500 mg Chew CHEW INSERT TABLET 5 TIMES DAILY WITH MEAL AND SNACK     Family History       Problem Relation (Age of Onset)    ALS Mother    Cancer Maternal Grandmother, Paternal Grandfather, Brother    Diabetes Sister, Maternal Aunt, Maternal Uncle    Kidney disease Sister, Maternal Aunt    Prostate cancer Brother    Scoliosis Brother          Tobacco Use    Smoking status: Never    Smokeless tobacco: Never   Substance and Sexual Activity    Alcohol use: No     Alcohol/week: 0.0 standard drinks of alcohol    Drug use: No    Sexual activity: Not Currently     Birth control/protection: Abstinence     Review of Systems   Constitutional:  Positive for activity change, fatigue and fever. Negative for chills.   HENT:  Negative for sore throat and trouble swallowing.    Eyes:  Negative for photophobia and visual disturbance.   Respiratory:  Negative for cough and shortness of breath.    Cardiovascular:  Negative for chest pain, palpitations and leg swelling.   Gastrointestinal:  Negative for abdominal pain, constipation, diarrhea, nausea and vomiting.   Endocrine: Negative for cold intolerance and heat intolerance.   Genitourinary:  Negative for dysuria and frequency.   Musculoskeletal:  Positive for back pain. Negative for arthralgias and myalgias.   Skin:  Negative for rash and wound.   Neurological:  Positive for weakness. Negative for dizziness, syncope and light-headedness.   Psychiatric/Behavioral:  Negative for confusion and hallucinations.    All other systems reviewed and are negative.    Objective:     Vital Signs (Most Recent):  Temp: 99.1 °F (37.3 °C) (12/31/24 1148)  Pulse: 100 (12/31/24 1148)  Resp: 20 (12/31/24 0909)  BP: (!) 122/59 (12/31/24 1148)  SpO2: 96 % (12/31/24 1148) Vital Signs (24h Range):  Temp:  [99.1 °F (37.3 °C)-101.7 °F (38.7 °C)] 99.1 °F (37.3 °C)  Pulse:  [] 100  Resp:  [20] 20  SpO2:  [96 %-98 %] 96  "%  BP: (105-176)/(52-99) 122/59     Weight: 98 kg (216 lb)  Body mass index is 32.84 kg/m².     Physical Exam  Vitals and nursing note reviewed.   Constitutional:       Appearance: Normal appearance. She is obese.   HENT:      Head: Normocephalic and atraumatic.   Eyes:      General: No scleral icterus.  Neck:      Comments: Right tunneled PermCath  Cardiovascular:      Rate and Rhythm: Normal rate and regular rhythm.      Heart sounds: No murmur heard.  Pulmonary:      Effort: Pulmonary effort is normal. No respiratory distress.      Breath sounds: Normal breath sounds. No wheezing.   Abdominal:      General: Bowel sounds are normal. There is no distension.      Palpations: Abdomen is soft.      Tenderness: There is no abdominal tenderness.   Genitourinary:     Comments: Suprapubic catheter  Musculoskeletal:         General: Normal range of motion.      Cervical back: Normal range of motion and neck supple.   Skin:     General: Skin is warm and dry.      Coloration: Skin is pale.   Neurological:      General: No focal deficit present.      Mental Status: She is alert and oriented to person, place, and time. Mental status is at baseline.      Comments: Slow to respond, but answers all questions appropriately and able to hold a conversation.                Significant Labs: All pertinent labs within the past 24 hours have been reviewed.  Blood Culture: No results for input(s): "LABBLOO" in the last 48 hours.  CBC:   Recent Labs   Lab 12/31/24  0937   WBC 20.72*   HGB 11.3*   HCT 36.5*        CMP:   Recent Labs   Lab 12/31/24  0937   *   K 4.1   CL 98   CO2 17*   *   BUN 28*   CREATININE 3.2*   CALCIUM 8.9   PROT 8.0   ALBUMIN 3.0*   BILITOT 0.4   ALKPHOS 158*   AST 12   ALT 12   ANIONGAP 14     Urine Culture: No results for input(s): "LABURIN" in the last 48 hours.    Significant Imaging: I have reviewed all pertinent imaging results/findings within the past 24 hours.  Assessment/Plan:     * " Severe sepsis  Severe sepsis 2/2 CAUTI  On arrival febrile to 101.7F, tachycardic 102 with WBC 21 and slow to respond (encephalopathy)  Suprapubic catheter changed in the ED  Urine cx NGTD  Blood cx NGTD  Continue Zosyn:  Chart review shows history of Pseudomonas, as well as ESBL UTIs    Toxic encephalopathy  Not confused but slow to respond 2/2 questions, able to hold a conversation and answer questions appropriately  Likely 2/2 sepsis  Treatment of infection as above      Catheter-associated urinary tract infection  See severe sepsis      HTN (hypertension)  Patient's blood pressure range in the last 24 hours was: BP  Min: 105/52  Max: 176/77.The patient's inpatient anti-hypertensive regimen is listed below:  Current Antihypertensives       Plan  - BP is controlled, no changes needed to their regimen  - As she took her morning BP meds and with sepsis, will hold on scheduled BP meds for now and restart when able    Positive JASON (antinuclear antibody)  History noted      Personal history of malignant neoplasm of breast  History noted  Continue Arimidex      ESRD (end stage renal disease) on dialysis  Creatine stable for now. BMP reviewed- noted Estimated Creatinine Clearance: 19.4 mL/min (A) (based on SCr of 3.2 mg/dL (H)). according to latest data. Based on current GFR, CKD stage is end stage.  Monitor UOP and serial BMP and adjust therapy as needed. Renally dose meds. Avoid nephrotoxic medications and procedures.    Nephro consult  Has been getting HD MF via right IJ PermCath  Needs f/u with Vascular Surgery for IJ removal    Pressure injury of right buttock, stage 3  Wound Care      Anemia in ESRD (end-stage renal disease)  Anemia is likely due to chronic disease due to ESRD. Most recent hemoglobin and hematocrit are listed below.  Recent Labs     12/31/24  0937   HGB 11.3*   HCT 36.5*     Plan  - Monitor serial CBC: Daily  - Transfuse PRBC if patient becomes hemodynamically unstable, symptomatic or H/H drops  below 7/21.  - Patient has not received any PRBC transfusions to date  - Patient's anemia is currently stable    Unspecified atrial fibrillation  Patient has paroxysmal (<7 days) atrial fibrillation. Patient is currently in sinus rhythm. [id number] Score: 3. The patients heart rate in the last 24 hours is as follows:  Pulse  Min: 52  Max: 104     Antiarrhythmics       Anticoagulants  apixaban tablet 5 mg, 2 times daily, Oral    Plan  - Replete lytes with a goal of K>4, Mg >2  - Patient is anticoagulated, see medications listed above.  - Patient's afib is currently controlled  - Previously had afib, and on Eliquis once a day because of bruising?  As she is being monitored will put on appropriate BID dosing    Type 2 diabetes mellitus treated with insulin  HbA1c 6.2, sugars controlled  Home DM regimen:  Largine 15 qHS  Hold scheduled insulin for now with AMS and decreased oral intake  SSI with POCT accuchecks to achieve blood glucose of 140-180 while hospitalized  Hypoglycemia protocol  Diabetic diet        Prophylactic use of anastrozole (Arimidex)  On Arimidex for history of breast cancer      Obesity, diabetes, and hypertension syndrome  Noted    Chronic pain  2/2 lumbar spondylosis  See above      Lumbar spondylosis  Chronic issue  Continue Cymbalta  Continue Robaxin TID PRN  Hold home prn Norco for now with acute encephalopathy      Class 1 obesity due to excess calories with serious comorbidity in adult  Body mass index is 32.84 kg/m². Morbid obesity complicates all aspects of disease management from diagnostic modalities to treatment. Weight loss encouraged and health benefits explained to patient.         Major depressive disorder, recurrent, moderate  Patient has recurrent depression which is moderate and is currently controlled. Will Continue anti-depressant medications. We will not consult psychiatry at this time. Patient does not display psychosis at this time. Continue to monitor closely and adjust plan  of care as needed.      Chronic and stable  Continue Cymbalta      Neurogenic bladder  Chronic issue  Has suprapubic cath  Continue Oxybutynin      Hyponatremia  Hyponatremia is likely due to volume overload from ESRD. The patient's most recent sodium results are listed below.  Recent Labs     12/31/24  0937   *     Plan  - Correct the sodium by 4-6mEq in 24 hours.   - Will treat the hyponatremia with HD  - Monitor sodium Daily.   - Patient hyponatremia is stable    Hyperlipidemia  Chronic and stable  Continue Lipitor    Hypothyroidism  Chronic and stable  Continue Synthroid  Check TSH        VTE Risk Mitigation (From admission, onward)           Ordered     apixaban tablet 5 mg  2 times daily         12/31/24 1251                                    Fariha Abraham MD  Department of Hospital Medicine  [facility] - Emergency Dept

## 2024-12-31 NOTE — PROGRESS NOTES
VASCULAR SURGERY SERVICE    REFERRING DOCTOR: Lavinia Nieto NP     CHIEF COMPLAINT:  End-stage renal disease    HISTORY OF PRESENT ILLNESS: Cecile Bowen is a 74 y.o. female with prior simple mastectomy on the left, sentinel node biopsy, no axillary node dissection, with end-stage renal disease times 3 months via right IJ PermCath who is sent for permanent dialysis access.  She is currently dialyzing only Mondays and Fridays.    She is right-handed.  She has no history of AICD or pacemaker placement.  She does have known bilateral carpal tunnel syndrome with the occasional numbness in her hands but has not had a release.    She takes Eliquis 5 mg but only once a day because of easy bruising.  She is on clear why she is on Eliquis    Finally she takes chronic narcotics for back pain    S/p    1a.  Diagnostic left arm arteriogram 09/19/2024  1/ trans radial PTA, left AV fistula 04/29/2024  2. left brachiocephalic AV fistula creation 02/14/2024      10/15/2024:   She now returns after diagnostic arteriogram of the left arm to evaluate for steal.  In the interim she has also started to cannulate her AV fistula has used exclusively for 2 weeks.  She is now complaining of left hand pain at night over the last 7-10 days which was not prior before.  Notably the small ulceration she had had a nailbed have healed however.  She does have prior carpal tunnel on this side and wears a chronic brace    12/03/2024:  A banding procedure but had to cancel because of transportation issues.  Notably however her intermittent hand pain has resolved and she now only having fingertip numbness.  All ulceration fingers also healed.  She was using the fistula essentially exclusively but 2-3 days ago had a episode of extravasation    12/31/2024:  She returns to potentially to remove her PermCath.  , she reports using the PermCath only yesterday.  On presentation here she is noted to be tachycardic with a pulse of 104 with a  temperature of 100.9°, the patient is quite somnolent although can answer questions appropriately then nods off sometimes during mid sentence.  This is not her baseline    Past Medical History:   Diagnosis Date    Cervicogenic migraine     Chronic pain     CKD (chronic kidney disease) stage 4, GFR 15-29 ml/min     [facility]    CKD (chronic kidney disease) stage 4, GFR 15-29 ml/min     Diabetes mellitus     Long term use of Insulin, Diabetic Neuropathy    Dialysis patient 1/21/2022    Fibromyalgia     Hydronephrosis     Hyperlipidemia     Hypertension 12/12/2012    Hypothyroidism 12/12/2012    ARON (iron deficiency anemia)     Insomnia     Levoscoliosis     Malignant neoplasm of upper-outer quadrant of left breast in female, estrogen receptor positive     Metabolic acidosis     Mobility impaired     Nuclear sclerosis - Both Eyes 3/24/2014    VIJAYA (obstructive sleep apnea)     Osteopenia     Pulmonary nodule     Recurrent UTI     Renal manifestation of secondary diabetes mellitus     Secondary hyperparathyroidism, renal     Urinary retention        Past Surgical History:   Procedure Laterality Date    ANGIOGRAPHY OF UPPER EXTREMITY N/A 9/19/2024    Procedure: Angiogram Extremity Bilateral;  Surgeon: RODRIGO Friedman III, MD;  Location: [facility] OR 2ND FLR;  Service: Vascular;  Laterality: N/A;  R femoral approach, arch & arm angiogram  168.76mgy  33.1145 gycm2  8.9min    AV FISTULA PLACEMENT Left 2/14/2024    Procedure: CREATION, AV FISTULA;  Surgeon: RODRIGO Friedman III, MD;  Location: [facility] OR 2ND FLR;  Service: Vascular;  Laterality: Left;  LUE AVF creation vs AVG insertion    BIOPSY OF URETER Left 2/18/2022    Procedure: BIOPSY, URETER;  Surgeon: Ronel Whittington MD;  Location: [facility] OR 1ST FLR;  Service: Urology;  Laterality: Left;    BREAST BIOPSY Right     benign    CHOLECYSTECTOMY      COLONOSCOPY N/A 1/13/2017    Procedure: COLONOSCOPY;  Surgeon: Morris Wiseman MD;  Location: [facility] ENDO (4TH FLR);   Service: Endoscopy;  Laterality: N/A;  Renal pt Nephrology advised to avoid phosphate preps    COLONOSCOPY N/A 12/7/2023    Procedure: COLONOSCOPY;  Surgeon: Herve Allen MD;  Location: [facility] ENDO (2ND FLR);  Service: Endoscopy;  Laterality: N/A;  HD MWF; labs prior  suprapubic catheter  pt does not ambulate-uses christina lift- will have sling with her  9/7 ref. by Anastasiia Campbell, , PEG, instr. mailed, Eliquis hold approval pending-[facility] to hold Eliquis 2 days per Dr Navarro-GT  1/30-precall complete-MS    CYSTOSCOPY N/A 10/8/2018    Procedure: CYSTOSCOPY;  Surgeon: Ronel Whittington MD;  Location: [facility] OR 1ST FLR;  Service: Urology;  Laterality: N/A;  45 min    CYSTOSCOPY N/A 3/25/2019    Procedure: CYSTOSCOPY;  Surgeon: Ronel Whittington MD;  Location: [facility] OR 1ST FLR;  Service: Urology;  Laterality: N/A;  45 min    CYSTOSCOPY N/A 8/26/2019    Procedure: CYSTOSCOPY;  Surgeon: Ronel Whittington MD;  Location: [facility] OR 1ST FLR;  Service: Urology;  Laterality: N/A;  45 min    CYSTOSCOPY N/A 7/2/2021    Procedure: CYSTOSCOPY;  Surgeon: Ronel Whittington MD;  Location: [facility] OR 1ST FLR;  Service: Urology;  Laterality: N/A;    CYSTOSCOPY  12/23/2021    Procedure: CYSTOSCOPY;  Surgeon: Ronel Whittington MD;  Location: [facility] OR 1ST FLR;  Service: Urology;;    CYSTOSCOPY  2/18/2022    Procedure: CYSTOSCOPY;  Surgeon: Ronel Whittington MD;  [facility];  Service: Urology;;    CYSTOSCOPY W/ URETERAL STENT PLACEMENT Left 5/15/2021    Procedure: CYSTOSCOPY, WITH URETERAL STENT INSERTION;  Surgeon: Levy Sánchez Jr., MD;  Location: [facility] OR 1ST FLR;  Service: Urology;  Laterality: Left;    CYSTOSCOPY WITH BIOPSY OF BLADDER N/A 1/27/2020    Procedure: CYSTOSCOPY, WITH BLADDER BIOPSY, WITH FULGURATION IF INDICATED;  Surgeon: Ronel Whittington MD;  Location: [facility] OR [address] [place] [address];  Service: Urology;  Laterality: N/A;    DILATION AND CURETTAGE OF UTERUS      FISTULOGRAM N/A  4/29/2024    Procedure: Fistulogram;  Surgeon: RODRIGO Friedman III, MD;  Location: [facility] CATH LAB;  Service: Peripheral Vascular;  Laterality: N/A;    GALLBLADDER SURGERY  2006    HYSTERECTOMY      INJECTION FOR SENTINEL NODE IDENTIFICATION Left 8/1/2019    Procedure: INJECTION, FOR SENTINEL NODE IDENTIFICATION;  Surgeon: Samia Fulton MD;  Location: [facility] OR [address] [place] [address];  Service: General;  Laterality: Left;    INJECTION OF BOTULINUM TOXIN TYPE A N/A 10/8/2018    Procedure: INJECTION, BOTULINUM TOXIN, TYPE A 300 UNITS;  Surgeon: Ronel Whittington MD;  Location: [facility] OR [address] [place] [address];  Service: Urology;  Laterality: N/A;    INJECTION OF BOTULINUM TOXIN TYPE A N/A 3/25/2019    Procedure: INJECTION, BOTULINUM TOXIN, TYPE A 300 UNITS;  Surgeon: Ronel Whittington MD;  Location: [facility] OR [address] [place] [address];  Service: Urology;  Laterality: N/A;    INJECTION OF BOTULINUM TOXIN TYPE A N/A 8/26/2019    Procedure: INJECTION, BOTULINUM TOXIN, TYPE A 300 UNITS;  Surgeon: Ronel Whittington MD;  Location: [facility] OR [address] [place] [address];  Service: Urology;  Laterality: N/A;    MASTECTOMY Left 8/1/2019    Procedure: MASTECTOMY 23 hour stay;  Surgeon: Samia Fulton MD;  Location: [facility] OR [address] [place] [address];  Service: General;  Laterality: Left;    MEDIPORT REMOVAL Right 6/24/2022    Procedure: REMOVAL, CATHETER, CENTRAL VENOUS, TUNNELED, WITH PORT;  Surgeon: Isauro Anderson MD;  Location: [facility] CATH LAB;  Service: Radiology;  Laterality: Right;    OVARIAN CYST SURGERY  1985    PERCUTANEOUS TRANSLUMINAL ANGIOPLASTY OF ARTERIOVENOUS FISTULA Left 4/29/2024    Procedure: PTA, AV FISTULA;  Surgeon: RODRIGO Friedman III, MD;  Location: [facility] CATH LAB;  Service: Peripheral Vascular;  Laterality: Left;    REPLACEMENT OF STENT Left 12/23/2021    Procedure: REPLACEMENT, STENT;  Surgeon: Ronel Whittington MD;  Location: [address];  Service: Urology;  Laterality: Left;    REPLACEMENT OF STENT Left 2/18/2022    Procedure: REPLACEMENT, STENT;  Surgeon: Ronel FRAZIER  "MD Pk;  Location: [facility] OR 1ST FLR;  Service: Urology;  Laterality: Left;    RETROGRADE PYELOGRAPHY Left 2/18/2022    Procedure: PYELOGRAM, RETROGRADE;  Surgeon: Ronel Whittington MD;  Location: [facility] OR 1ST FLR;  Service: Urology;  Laterality: Left;    SENTINEL LYMPH NODE BIOPSY Left 8/1/2019    Procedure: BIOPSY, LYMPH NODE, SENTINEL;  Surgeon: Samia Fulton MD;  Location: [facility] OR 2ND FLR;  Service: General;  Laterality: Left;    spt placement      TONSILLECTOMY, ADENOIDECTOMY      TOTAL ABDOMINAL HYSTERECTOMY W/ BILATERAL SALPINGOOPHORECTOMY  1985    URETEROSCOPY Left 2/18/2022    Procedure: URETEROSCOPY;  Surgeon: Ronel Whittington MD;  [facility];  Service: Urology;  Laterality: Left;         Current Outpatient Medications:     acetaminophen (TYLENOL) 325 MG tablet, Take 2 tablets (650 mg total) by mouth every 6 (six) hours as needed for Pain., Disp: , Rfl:     amLODIPine (NORVASC) 10 MG tablet, Take 10 mg by mouth., Disp: , Rfl:     anastrozole (ARIMIDEX) 1 mg Tab, TAKE 1 TABLET BY MOUTH EVERY DAY, Disp: 90 tablet, Rfl: 2    atorvastatin (LIPITOR) 80 MG tablet, Take 1 tablet (80 mg total) by mouth once daily., Disp: 90 tablet, Rfl: 3    BD INSULIN SYRINGE ULTRA-FINE 0.5 mL 31 gauge x 5/16" Syrg, USE WITH INSULIN 4 TIMES A DAY, Disp: 100 each, Rfl: 12    calcium acetate,phosphat bind, (PHOSLO) 667 mg capsule, Take 667 mg by mouth 3 (three) times daily with meals., Disp: , Rfl:     carvediloL (COREG) 12.5 MG tablet, Take 1 tablet (12.5 mg total) by mouth 2 (two) times daily., Disp: 180 tablet, Rfl: 3    diclofenac sodium (VOLTAREN) 1 % Gel, Apply 2 g topically once daily., Disp: , Rfl:     DULoxetine (CYMBALTA) 20 MG capsule, Take 2 capsules (40 mg total) by mouth once daily., Disp: 180 capsule, Rfl: 1    ELIQUIS 5 mg Tab, TAKE 1 TABLET BY MOUTH TWICE A DAY, Disp: 180 tablet, Rfl: 3    erenumab-aooe (AIMOVIG AUTOINJECTOR) 140 mg/mL autoinjector, 140 mg MONTHLY (route: " "subcutaneous), Disp: 1 each, Rfl: 11    ergocalciferol (ERGOCALCIFEROL) 50,000 unit Cap, Take 1 capsule (50,000 Units total) by mouth every 7 days., Disp: 12 capsule, Rfl: 3    HYDROcodone-acetaminophen (NORCO)  mg per tablet, Take 1 tablet by mouth every 6 (six) hours as needed for Pain., Disp: 120 tablet, Rfl: 0    ipratropium (ATROVENT) 21 mcg (0.03 %) nasal spray, 2 sprays by Each Nostril route 2 (two) times daily., Disp: 30 mL, Rfl: 0    lactulose (CHRONULAC) 20 gram/30 mL Soln, Take 30 mLs (20 g total) by mouth every 12 (twelve) hours as needed (for constipation)., Disp: 900 mL, Rfl: 0    LANTUS SOLOSTAR U-100 INSULIN 100 unit/mL (3 mL) InPn pen, INJECT 12-15 UNITS UNDER THE SKIN at night, Disp: 15 mL, Rfl: 3    losartan (COZAAR) 100 MG tablet, Take 1 tablet (100 mg total) by mouth once daily., Disp: 90 tablet, Rfl: 3    methocarbamoL (ROBAXIN) 750 MG Tab, TAKE 1 TO 2 TABLETS(750 TO 1500 MG) BY MOUTH THREE TIMES DAILY AS NEEDED FOR MUSCLE SPASMS, Disp: 180 tablet, Rfl: 1    ondansetron (ZOFRAN-ODT) 8 MG TbDL, 8 mg EVERY 12 HOURS (route: oral), Disp: , Rfl:     oxybutynin (DITROPAN-XL) 10 MG 24 hr tablet, TAKE 1 TABLET BY MOUTH EVERY DAY, Disp: 90 tablet, Rfl: 4    oxyCODONE (ROXICODONE) 5 MG immediate release tablet, Take 1 tablet (5 mg total) by mouth daily as needed (severe pain)., Disp: 30 tablet, Rfl: 0    patiromer calcium sorbitex (VELTASSA) 16.8 gram PwPk, Take 1 packet (16.8 g total) by mouth once daily. for 90 doses, Disp: 30 packet, Rfl: 2    pen needle, diabetic (BD ULTRA-FINE SHORT PEN NEEDLE) 31 gauge x 5/16" Ndle, USE WITH LANTUS DAILY, e 11.65, Disp: 100 each, Rfl: 3    silver sulfADIAZINE 1% (SILVADENE) 1 % cream, Apply topically Every 3 (three) days., Disp: 20 g, Rfl: 0    sodium bicarbonate 650 MG tablet, Take 1 tablet (650 mg total) by mouth once daily., Disp: 30 tablet, Rfl: 11    sumatriptan (IMITREX) 100 MG tablet, Take 1 tablet (100 mg total) by mouth every 2 (two) hours as needed " for Migraine (Limit 2 in 14 hours and 9 in one month)., Disp: 30 tablet, Rfl: 1    SYNTHROID 50 mcg tablet, TAKE 1 TABLET BY MOUTH BEFORE BREAKFAST. ADMINISTER ON AN EMPTY STOMACH AT LEAST 30 MINUTES BEFORE FOOD. IF RECEIVING TUBE FEEDS, HOLD TUBE FEEDS FOR 1 HOUR BEFORE AND AFTER LEVOTHYROXINE ADMINISTRATION., Disp: 90 tablet, Rfl: 2    traZODone (DESYREL) 100 MG tablet, TAKE 1 TABLET BY MOUTH NIGHTLY AS NEEDED FOR INSOMNIA., Disp: 90 tablet, Rfl: 2    UNABLE TO FIND, medication name: Tylenol Migraine- APAP, ASA, caffeine, Disp: , Rfl:     VELPHORO 500 mg Chew, CHEW INSERT TABLET 5 TIMES DAILY WITH MEAL AND SNACK, Disp: , Rfl:     furosemide (LASIX) 40 MG tablet, Take 1 tablet (40 mg total) by mouth once daily., Disp: 30 tablet, Rfl: 11  No current facility-administered medications for this visit.    Facility-Administered Medications Ordered in Other Visits:     epoetin chloe injection 2,000 Units, 2,000 Units, Intravenous, 1 time in Clinic/[facility], Emmanuel Hare Jr., MD    heparin (porcine) injection 2,000 Units, 2,000 Units, Intravenous, Once, Emmanuel Hare Jr., MD    heparin (porcine) injection 2,000 Units, 2,000 Units, Intravenous, Once, Emmanuel Hare Jr., MD    heparin (porcine) injection 4,000 Units, 4,000 Units, Intra-Catheter, 1 time in Clinic/[facility], Emmanuel Hare Jr., MD    iron sucrose injection 100 mg, 100 mg, Intravenous, 1 time in Clinic/[facility], Emmanuel Hare Jr., MD    Review of patient's allergies indicates:  No Known Allergies    Family History   Problem Relation Name Age of Onset    Diabetes Sister Kat     Kidney disease Sister Kat         CKD III    ALS Mother          d.    Cancer Maternal Grandmother          d. colon    Cancer Paternal Grandfather          d. lung    Scoliosis Brother [place]         increased pain    Prostate cancer Brother [place]         cured s/p surgery    Cancer Brother [place]         rare cancer that got into the bones    Diabetes Maternal Aunt       Kidney disease Maternal Aunt      Diabetes Maternal Uncle      Amblyopia Neg Hx      Blindness Neg Hx      Cataracts Neg Hx      Glaucoma Neg Hx      Macular degeneration Neg Hx      Retinal detachment Neg Hx      Strabismus Neg Hx         Social History     Tobacco Use    Smoking status: Never    Smokeless tobacco: Never   Substance Use Topics    Alcohol use: No     Alcohol/week: 0.0 standard drinks of alcohol    Drug use: No     PHYSICAL EXAM:   BP (!) 160/71 (BP Location: Right arm, Patient Position: Sitting)   Pulse 104   Temp (!) 100.9 °F (38.3 °C) (Oral)   Constitutional:  Patient is somnolent  In no distress.  Traveling by wheelchair   Neurological: Normal speech  no focal findings  CN II - XII grossly intact.    Psychiatric: Mood and affect appropriate and symmetric.   HEENT: Normocephalic / atraumatic  PERRLA  Midline trachea  No scars across the neck   Cardiac: Regular rate and rhythm.   Pulmonary: Normal pulmonary effort.   Abdomen: Soft, not distended.     Skin: Warm and well perfused.    Vascular:  Radial pulse on the left today is trace 1+ palpable, although it clearly stronger with fistula compression   Extremities/  Musculoskeletal: Left arm:  Left brachiocephalic AV fistula has significant excellent thrill with no significant pulsatility.  Moderate hematoma in the mid upper arm with some ecchymosis consistent with recent extravasation event.  Is unchanged from prior still    Left hand 5/5  strength.     2024      IMAGIN/3/24 Duplex of the fistula showed to be patent without stenosis with a flow volume of 1.9 L.  Small hematoma is noted    Prior Left finger PPG is are completely flat in all digits.  This does improve somewhat with fistula compression  Duplex of the fistula demonstrates no stenosis, flow volume of 1.9 L  Diameter 8.9 proximal, 8.3 mid, 7.6 distal  Depth 4.1 proximal, 5.9 minute, 6.5 distal      IMPRESSION:   Pt With altered mental status, fever and tachycardia.  No  clinical evidence of PermCath infection on inspection  Improved vascular steal.  Patient was not having any hand pain now.  We will not reschedule the banding procedure.  Patient has a stable flow volume of 1.9 L in this fistula.  Recent extravasation event.  The patient is not comfortable taking the PermCath that at this time  3.  Narcotic dependence for chronic back pain.  4.  Nonambulatory status x1 year  5.  On chronic Eliquis, evidently for atrial fibrillation diagnosed in 2021.  By clinical exam she does not feel that she is in AFib.  She was unaware of why she is on the Eliquis       PLAN:    To ED for evaluation of her somnolence fever and tachycardia  Follow up with me in 3 weeks for PermCath removal if using The fistula exclusively   Hold eliquis 2 days prior to clinic follow-up    WALE Friedman III, MD, FACS  Professor and Chief, Vascular and Endovascular Surgery

## 2024-12-31 NOTE — HPI
Ms. Cecile Bowen is a 74 y.o. female with ESRD on HD, T2DM, afib on Eliquis, HTN, obesity, history of breast cancer, chronic suprapubic catheter for neurogenic bladder and chronic back pain who [place] [facility] [place] from Vascular Surgery for evaluation of fever and AMS.  History is obtained from patient.  She reports that over the last few days, she has been having fever and lethargy.  She went to see her Vascular Surgeon for right IJ PermCath removal, as her fistula has matured.  She is currently dialyzing only Mondays and Fridays via right IJ PermCath.  However, upon arrival to clinic she was febrile to 100.9F, slow to respond and tachy to 104.  She was sent to the ER for further evaluation.  At baseline, she uses a wheelchair.  She lives with her sister.  She currently endorses back pain that is not escalated from normal.  She is full code.  She took all of her medications prior to going to clinic except her Eliquis.    Upon arrival to the ER, vitals were temp 101.7F, , /77.  Labs showed WBC 20, Hgb 11.3, Sodium 129, BUN/Cr 28/3.2, Lactic 0.8.  UA was dirty.  Suprapubic catheter was exchanged in the ED.  CXR was negative.  She was given Vanc and Zosyn was admitted to Hospital Medicine for further management.

## 2024-12-31 NOTE — ED PROVIDER NOTES
"Encounter Date: 12/31/2024       History     Chief Complaint   Patient presents with    Multiple complaints     Sent from vasc clinic , was suppose to get perm cath out of r chest, fever, has suprapubic cath     74 y.o. female w/ PMH of ESRD on HD M/F, hypertension, hyperlipidemia, ARON, breast CA, chronic indwelling urinary catheter d/t neurogenic bladder who presents to the ED for concern for catheter associated infection. Patient presented to her vascular surgeon this morning for a right IJ PermCath removal and upon arrival, the patient was found to have altered mental status and increasingly somnolent. Her temperature was taken at that time at was 102F where they told her to present to the ED. Patient reports feeling "not well" for about a week and complains of back pain and lower extremity swelling that is chronic. Patient denies feeling feverish, chills, nausea, or diarrhea however presented to the ED in heavy blankets and bowel incontinence.     Patient has a functioning AV fistula in the left arm. Upon arrival to the ED, the patient's suprapubic catheter was replaced and a clean catch urine sample was obtained. Patient denies any pain or swelling from the right IJ PermCath site or the suprapubic cath.     The history is provided by the patient.     Review of patient's allergies indicates:  No Known Allergies  Past Medical History:   Diagnosis Date    Cervicogenic migraine     Chronic pain     CKD (chronic kidney disease) stage 4, GFR 15-29 ml/min     [facility]    CKD (chronic kidney disease) stage 4, GFR 15-29 ml/min     Diabetes mellitus     Long term use of Insulin, Diabetic Neuropathy    Dialysis patient 1/21/2022    Fibromyalgia     Hydronephrosis     Hyperlipidemia     Hypertension 12/12/2012    Hypothyroidism 12/12/2012    ARON (iron deficiency anemia)     Insomnia     Levoscoliosis     Malignant neoplasm of upper-outer quadrant of left breast in female, estrogen receptor positive     Metabolic " acidosis     Mobility impaired     Nuclear sclerosis - Both Eyes 3/24/2014    VIJAYA (obstructive sleep apnea)     Osteopenia     Pulmonary nodule     Recurrent UTI     Renal manifestation of secondary diabetes mellitus     Secondary hyperparathyroidism, renal     Urinary retention      Past Surgical History:   Procedure Laterality Date    ANGIOGRAPHY OF UPPER EXTREMITY N/A 9/19/2024    Procedure: Angiogram Extremity Bilateral;  Surgeon: RODRIGO Friedman III, MD;  Location: [facility] OR 2ND FLR;  Service: Vascular;  Laterality: N/A;  R femoral approach, arch & arm angiogram  168.76mgy  33.1145 gycm2  8.9min    AV FISTULA PLACEMENT Left 2/14/2024    Procedure: CREATION, AV FISTULA;  Surgeon: RODRIGO Friedman III, MD;  Location: [facility] OR 2ND FLR;  Service: Vascular;  Laterality: Left;  LUE AVF creation vs AVG insertion    BIOPSY OF URETER Left 2/18/2022    Procedure: BIOPSY, URETER;  Surgeon: Ronel Whittington MD;  [facility];  Service: Urology;  Laterality: Left;    BREAST BIOPSY Right     benign    CHOLECYSTECTOMY      COLONOSCOPY N/A 1/13/2017    Procedure: COLONOSCOPY;  Surgeon: Morris Wiseman MD;  Location: [facility] ENDO (4TH FLR);  Service: Endoscopy;  Laterality: N/A;  Renal pt Nephrology advised to avoid phosphate preps    COLONOSCOPY N/A 12/7/2023    Procedure: COLONOSCOPY;  Surgeon: Herve Allen MD;  Location: [facility] ENDO (2ND FLR);  Service: Endoscopy;  Laterality: N/A;  HD MWF; labs prior  suprapubic catheter  pt does not ambulate-uses christina lift- will have sling with her  9/7 ref. by Anastasiia Campbell DO, PEG, instr. mailed, Eliquis hold approval pending-st  ok to hold Eliquis 2 days per Dr Navarro-GT  1/30-precall complete-MS    CYSTOSCOPY N/A 10/8/2018    Procedure: CYSTOSCOPY;  Surgeon: Ronel Whittington MD;  [facility];  Service: Urology;  Laterality: N/A;  45 min    CYSTOSCOPY N/A 3/25/2019    Procedure: CYSTOSCOPY;  Surgeon: Ronel Whittington MD;   Location: [facility] OR 1ST FLR;  Service: Urology;  Laterality: N/A;  45 min    CYSTOSCOPY N/A 8/26/2019    Procedure: CYSTOSCOPY;  Surgeon: Ronel Whittington MD;  Location: [facility] OR 1ST FLR;  Service: Urology;  Laterality: N/A;  45 min    CYSTOSCOPY N/A 7/2/2021    Procedure: CYSTOSCOPY;  Surgeon: Ronel Whittington MD;  [facility];  Service: Urology;  Laterality: N/A;    CYSTOSCOPY  12/23/2021    Procedure: CYSTOSCOPY;  Surgeon: Ronel Whittington MD;  [facility];  Service: Urology;;    CYSTOSCOPY  2/18/2022    Procedure: CYSTOSCOPY;  Surgeon: Ronel Whittington MD;  [facility];  Service: Urology;;    CYSTOSCOPY W/ URETERAL STENT PLACEMENT Left 5/15/2021    Procedure: CYSTOSCOPY, WITH URETERAL STENT INSERTION;  Surgeon: Levy Sánchez Jr., MD;  [facility];  Service: Urology;  Laterality: Left;    CYSTOSCOPY WITH BIOPSY OF BLADDER N/A 1/27/2020    Procedure: CYSTOSCOPY, WITH BLADDER BIOPSY, WITH FULGURATION IF INDICATED;  Surgeon: Ronel Whittington MD;  Location: [facility] OR [address] [place] [address];  Service: Urology;  Laterality: N/A;    DILATION AND CURETTAGE OF UTERUS      FISTULOGRAM N/A 4/29/2024    Procedure: Fistulogram;  Surgeon: RODRIGO Friedman III, MD;  Location: [facility] CATH LAB;  Service: Peripheral Vascular;  Laterality: N/A;    GALLBLADDER SURGERY  2006    HYSTERECTOMY      INJECTION FOR SENTINEL NODE IDENTIFICATION Left 8/1/2019    Procedure: INJECTION, FOR SENTINEL NODE IDENTIFICATION;  Surgeon: Samia Fulton MD;  Location: [facility] OR 2ND [facility];  Service: General;  Laterality: Left;    INJECTION OF BOTULINUM TOXIN TYPE A N/A 10/8/2018    Procedure: INJECTION, BOTULINUM TOXIN, TYPE A 300 UNITS;  Surgeon: Ronel Whittington MD;  Location: [facility] OR 1ST FLR;  Service: Urology;  Laterality: N/A;    INJECTION OF BOTULINUM TOXIN TYPE A N/A 3/25/2019    Procedure: INJECTION, BOTULINUM TOXIN, TYPE A 300 UNITS;  Surgeon: Ronel Whittington MD;   [address]  Service: Urology;  Laterality: N/A;    INJECTION OF BOTULINUM TOXIN TYPE A N/A 8/26/2019    Procedure: INJECTION, BOTULINUM TOXIN, TYPE A 300 UNITS;  Surgeon: Ronel Whittington MD;  [facility];  Service: Urology;  Laterality: N/A;    MASTECTOMY Left 8/1/2019    Procedure: MASTECTOMY 23 hour stay;  Surgeon: Samia Fulton MD;  Location: [facility] OR 2ND FLR;  Service: General;  Laterality: Left;    MEDIPORT REMOVAL Right 6/24/2022    Procedure: REMOVAL, CATHETER, CENTRAL VENOUS, TUNNELED, WITH PORT;  Surgeon: Isauro Anderson MD;  Location: [facility] CATH LAB;  Service: Radiology;  Laterality: Right;    OVARIAN CYST SURGERY  1985    PERCUTANEOUS TRANSLUMINAL ANGIOPLASTY OF ARTERIOVENOUS FISTULA Left 4/29/2024    Procedure: PTA, AV FISTULA;  Surgeon: RODRIGO Friedman III, MD;  Location: [facility] CATH LAB;  Service: Peripheral Vascular;  Laterality: Left;    REPLACEMENT OF STENT Left 12/23/2021    Procedure: REPLACEMENT, STENT;  Surgeon: Ronel Whittington MD;  Location: [address];  Service: Urology;  Laterality: Left;    REPLACEMENT OF STENT Left 2/18/2022    Procedure: REPLACEMENT, STENT;  Surgeon: Ronel Whittington MD;  Location: [facility] OR [address] [place] [address];  Service: Urology;  Laterality: Left;    RETROGRADE PYELOGRAPHY Left 2/18/2022    Procedure: PYELOGRAM, RETROGRADE;  Surgeon: Ronel Whittington MD;  Location: [address];  Service: Urology;  Laterality: Left;    SENTINEL LYMPH NODE BIOPSY Left 8/1/2019    Procedure: BIOPSY, LYMPH NODE, SENTINEL;  Surgeon: Samia Fulton MD;  [facility];  Service: General;  Laterality: Left;    spt placement      TONSILLECTOMY, ADENOIDECTOMY      TOTAL ABDOMINAL HYSTERECTOMY W/ BILATERAL SALPINGOOPHORECTOMY  1985    URETEROSCOPY Left 2/18/2022    Procedure: URETEROSCOPY;  Surgeon: Ronel Whittington MD;  Location: [facility] OR [address] [place] [address];  Service: Urology;  Laterality: Left;     Family History   Problem Relation Name  Age of Onset    Diabetes Sister Kta     Kidney disease Sister Kat         CKD III    ALS Mother          d.    Cancer Maternal Grandmother          d. colon    Cancer Paternal Grandfather          d. lung    Scoliosis Brother [place]         increased pain    Prostate cancer Brother [place]         cured s/p surgery    Cancer Brother [place]         rare cancer that got into the bones    Diabetes Maternal Aunt      Kidney disease Maternal Aunt      Diabetes Maternal Uncle      Amblyopia Neg Hx      Blindness Neg Hx      Cataracts Neg Hx      Glaucoma Neg Hx      Macular degeneration Neg Hx      Retinal detachment Neg Hx      Strabismus Neg Hx       Social History     Tobacco Use    Smoking status: Never    Smokeless tobacco: Never   Substance Use Topics    Alcohol use: No     Alcohol/week: 0.0 standard drinks of alcohol    Drug use: No     Review of Systems   Constitutional:  Positive for fatigue. Negative for chills and fever.   HENT: Negative.     Eyes: Negative.    Respiratory: Negative.  Negative for cough, chest tightness and shortness of breath.    Cardiovascular:  Positive for leg swelling. Negative for chest pain and palpitations.   Gastrointestinal:  Positive for abdominal pain. Negative for anal bleeding, blood in stool, constipation, diarrhea, nausea, rectal pain and vomiting.   Endocrine: Negative.    Genitourinary: Negative.    All other systems reviewed and are negative.      Physical Exam     Initial Vitals [12/31/24 0909]   BP Pulse Resp Temp SpO2   (!) 176/77 102 20 (!) 101.7 °F (38.7 °C) 96 %      MAP       --         Physical Exam    Nursing note and vitals reviewed.  Constitutional: She appears well-developed. She appears listless. She is diaphoretic. She is Obese . She has a sickly appearance. She appears ill.   HENT:   Head: Normocephalic and atraumatic.   Eyes: EOM are normal.   Neck: Neck supple.   Normal range of motion.  Cardiovascular:  Normal rate, regular rhythm, normal heart sounds and  intact distal pulses.           Pulmonary/Chest: Breath sounds normal.   Abdominal: Abdomen is soft. She exhibits no distension. There is no abdominal tenderness.   Musculoskeletal:         General: Normal range of motion.      Cervical back: Normal range of motion and neck supple.      Right lower leg: Swelling and tenderness present. 1+ Pitting Edema present.      Left lower leg: Swelling present. 1+ Pitting Edema present.        Legs:       Comments: Mild warmth and tenderness to the right lower leg     Neurological: She appears listless. GCS eye subscore is 4. GCS verbal subscore is 5. GCS motor subscore is 6.   Skin: Skin is warm.   Psychiatric: Her mood appears anxious. She is inattentive.         ED Course   Suprapubic Tube Replacement    Date/Time: 12/31/2024 10:53 AM    Performed by: Alex Zuniga MD  Authorized by: Michelle Mosher MD  Consent: The procedure was performed in an emergent situation. Verbal consent obtained.  Consent given by: patient  Patient understanding: patient states understanding of the procedure being performed  Patient consent: the patient's understanding of the procedure matches consent given  Required items: required blood products, implants, devices, and special equipment available  Patient identity confirmed: verbally with patient  Indications: catheter change, neurogenic bladder, urine output monitoring and urine specimen collection  Local anesthesia used: no    Anesthesia:  Local anesthesia used: no    Sedation:  Patient sedated: no    Preparation: Patient was prepped and draped in the usual sterile fashion.  Suprapubic aspiration by: catheter  Catheter type: Tiwari  Catheter size: 16 Fr  Number of attempts: 1  Patient tolerance: patient tolerated the procedure well with no immediate complications        Labs Reviewed   CBC W/ AUTO DIFFERENTIAL - Abnormal       Result Value    WBC 20.72 (*)     RBC 3.76 (*)     Hemoglobin 11.3 (*)     Hematocrit 36.5 (*)     MCV 97      MCH  30.1      MCHC 31.0 (*)     RDW 13.0      Platelets 335      MPV 8.5 (*)     Immature Granulocytes 1.2 (*)     Gran # (ANC) 19.1 (*)     Immature Grans (Abs) 0.25 (*)     Lymph # 0.7 (*)     Mono # 0.6      Eos # 0.1      Baso # 0.05      nRBC 0      Gran % 92.1 (*)     Lymph % 3.5 (*)     Mono % 2.7 (*)     Eosinophil % 0.3      Basophil % 0.2      Differential Method Automated     COMPREHENSIVE METABOLIC PANEL - Abnormal    Sodium 129 (*)     Potassium 4.1      Chloride 98      CO2 17 (*)     Glucose 169 (*)     BUN 28 (*)     Creatinine 3.2 (*)     Calcium 8.9      Total Protein 8.0      Albumin 3.0 (*)     Total Bilirubin 0.4      Alkaline Phosphatase 158 (*)     AST 12      ALT 12      eGFR 14.8 (*)     Anion Gap 14     URINALYSIS, REFLEX TO URINE CULTURE - Abnormal    Specimen UA Urine, Catheterized      Color, UA Yellow      Appearance, UA Cloudy (*)     pH, UA 6.0      Specific Gravity, UA 1.025      Protein, UA 3+ (*)     Glucose, UA Trace (*)     Ketones, UA 1+ (*)     Bilirubin (UA) Negative      Occult Blood UA 2+ (*)     Nitrite, UA Negative      Leukocytes, UA 3+ (*)     Narrative:     Specimen Source->Urine   APTT - Abnormal    aPTT 38.7 (*)    URINALYSIS MICROSCOPIC - Abnormal    RBC, UA 14 (*)     WBC, UA >100 (*)     WBC Clumps, UA Occasional (*)     Bacteria Many (*)     Squam Epithel, UA 11      Hyaline Casts, UA 0      Microscopic Comment SEE COMMENT      Narrative:     Specimen Source->Urine   PROCALCITONIN - Abnormal    Procalcitonin 1.41 (*)    HEMOGLOBIN A1C - Abnormal    Hemoglobin A1C 5.9 (*)     Estimated Avg Glucose 123      Narrative:     add on [facility] per MARGARITA BARLOW MD. ORDER # [id number]   POCT GLUCOSE - Abnormal    POCT Glucose 148 (*)    PROTIME-INR    Prothrombin Time 11.4      INR 1.0     TROPONIN I HIGH SENSITIVITY    Troponin I High Sensitivity 10     SARS-COV2 (COVID) WITH FLU/RSV BY PCR    SARS-CoV2 (COVID-19) Qualitative PCR Not Detected      Influenza A, Molecular Not  Detected      Influenza B, Molecular Not Detected      RSV Ag by Molecular Method Not Detected     TSH    TSH 1.631     LACTIC ACID, PLASMA    Lactate (Lactic Acid) 0.8     ISTAT LACTATE    POC Lactate 1.01      Sample VENOUS      Site Other      Allens Test N/A     POCT GLUCOSE MONITORING CONTINUOUS        ECG Results              EKG 12-lead (Final result)        Collection Time Result Time QRS Duration OHS QTC Calculation    12/31/24 09:33:53 12/31/24 14:17:21 78 432                     Final result by Interface, Lab In [facility] (12/31/24 14:17:28)                   Narrative:    Test Reason : Z13.6,    Vent. Rate : 103 BPM     Atrial Rate : 103 BPM     P-R Int : 188 ms          QRS Dur :  78 ms      QT Int : 330 ms       P-R-T Axes :   8 -18  13 degrees    QTcB Int : 432 ms    Sinus tachycardia  Voltage criteria for left ventricular hypertrophy  Possible Lateral infarct ,age undetermined  Inferior infarct ,age undetermined  Abnormal ECG  When compared with ECG of 12-Jun-2024 15:18,  Nonspecific ST abnormality present in inferior leads    Confirmed by Cesar Sotelo ([id number]) on 12/31/2024 2:17:16 PM    Referred By: AAAREFERRAL SELF           Confirmed By: Cesar Sotelo                                  Imaging Results              X-Ray Chest AP Portable (Final result)  Result time 12/31/24 12:18:31      Final result by Nithin Taylor MD (12/31/24 12:18:31)                   Impression:      See above      Electronically signed by: Nithin Taylor MD  Date:    12/31/2024  Time:    12:18               Narrative:    EXAMINATION:  XR CHEST AP PORTABLE    CLINICAL HISTORY:  Sepsis;    TECHNIQUE:  Single frontal view of the chest was performed.    COMPARISON:  N 06/12/2024 one    FINDINGS:  Central venous catheter in the SVC.  Heart size normal.  No significant airspace consolidation pleural effusion identified                                       Medications   glucose chewable tablet 16 g (has no administration in time  range)   glucose chewable tablet 24 g (has no administration in time range)   dextrose 50% injection 12.5 g (has no administration in time range)   dextrose 50% injection 25 g (has no administration in time range)   glucagon (human recombinant) injection 1 mg (has no administration in time range)   anastrozole tablet 1 mg (1 mg Oral Given 1/1/25 0901)   atorvastatin tablet 80 mg (80 mg Oral Given 1/1/25 0901)   DULoxetine DR capsule 40 mg (40 mg Oral Given 1/1/25 1059)   apixaban tablet 5 mg (5 mg Oral Given 1/1/25 2051)   ipratropium 42 mcg (0.06 %) nasal spray 2 spray (2 sprays Each Nostril Given 1/1/25 2101)   methocarbamoL tablet 750 mg (750 mg Oral Given 1/1/25 0901)   oxybutynin 24 hr tablet 10 mg (10 mg Oral Given 1/1/25 0901)   sodium bicarbonate tablet 650 mg (650 mg Oral Given 1/1/25 0902)   levothyroxine tablet 50 mcg (50 mcg Oral Given 1/2/25 0500)   traZODone tablet 100 mg (has no administration in time range)   sodium chloride 0.9% flush 5 mL (has no administration in time range)   ondansetron injection 8 mg (8 mg Intravenous Given 1/1/25 1439)   polyethylene glycol packet 17 g (has no administration in time range)   acetaminophen tablet 650 mg (650 mg Oral Given 1/1/25 1316)   naloxone 0.4 mg/mL injection 0.02 mg (has no administration in time range)   HYDROcodone-acetaminophen  mg per tablet 1 tablet (1 tablet Oral Given 1/2/25 0456)   piperacillin-tazobactam (ZOSYN) 4.5 g in D5W 100 mL IVPB (MB+) (0 g Intravenous Stopped 1/2/25 0057)   DAPTOmycin (CUBICIN) 970 mg in 0.9% NaCl SolP 50 mL IVPB (has no administration in time range)   heparin (porcine) injection 1,000 Units (1,000 Units Intra-Catheter Given 1/1/25 1832)   vancomycin (VANCOCIN) 2,500 mg in 0.9% NaCl 500 mL IVPB (0 mg Intravenous Stopped 12/31/24 1437)   acetaminophen tablet 1,000 mg (1,000 mg Oral Given 12/31/24 1033)     Medical Decision Making  74 y.o. female with PMH of ESRD on HD M/F and a chronic indwelling catheter presents  to the ED with 2/4 positive SIRS criteria with somnolence and AMS.     This patient does have evidence of infective focus  My overall impression is sepsis.  Source: Urinary Tract and Skin and Soft Tissue (location: right permacath IJ)  Antibiotics given- Antibiotics (72h ago, onward)    Start     Stop Route Frequency Ordered    12/31/24 1045  vancomycin (VANCOCIN) 2,500 mg in 0.9% NaCl 500 mL IVPB           -- IV Once 12/31/24 0935    12/31/24 0934  piperacillin-tazobactam (ZOSYN) 4.5 g in D5W 100 mL IVPB   (MB+)         -- IV Every 8 hours (non-standard times) 12/31/24 0935      Latest lactate reviewed-  Lab             12/31/24                       0939          POCLAC       1.01          Organ dysfunction indicated by Encephalopathy    Fluid challenge Contraindicated- Fluid bolus is contraindicated in this patient due to End Stage Renal Disease     Post- resuscitation assessment No - Post resuscitation assessment not needed       Will Not start Pressors- Levophed for MAP of 65  Source control achieved by: replacement of suprapubic cath    Admit to hospital medicine for management of sepsis in the setting of chronic indwelling catheters.       Amount and/or Complexity of Data Reviewed  Labs: ordered.     Details: WBC increased to 21, Na decreased to 129  Radiology: ordered and independent interpretation performed.     Details: CXR reviewed. No acute changes.     Risk  OTC drugs.  Prescription drug management.  Drug therapy requiring intensive monitoring for toxicity.  Decision regarding hospitalization.    Critical Care  Total time providing critical care: 30 minutes              Attending Attestation:   Physician Attestation Statement for Resident:  As the supervising MD   Physician Attestation Statement: I have personally seen and examined this patient.   I agree with the above history.  -:   As the supervising MD I agree with the above PE.     As the supervising MD I agree with the above treatment, course,  plan, and disposition.                                           Clinical Impression:  Final diagnoses:  [Z13.6] Screening for cardiovascular condition  [A41.9] Sepsis, due to unspecified organism, unspecified whether acute organ dysfunction present (Primary)          ED Disposition Condition    Admit                 Alex Zuniga MD  Resident  12/31/24 1109       Michelle Mosher MD  01/02/25 [id number]

## 2024-12-31 NOTE — ASSESSMENT & PLAN NOTE
Anemia is likely due to chronic disease due to ESRD. Most recent hemoglobin and hematocrit are listed below.  Recent Labs     12/31/24  0937   HGB 11.3*   HCT 36.5*     Plan  - Monitor serial CBC: Daily  - Transfuse PRBC if patient becomes hemodynamically unstable, symptomatic or H/H drops below 7/21.  - Patient has not received any PRBC transfusions to date  - Patient's anemia is currently stable

## 2024-12-31 NOTE — SUBJECTIVE & OBJECTIVE
Past Medical History:   Diagnosis Date    Cervicogenic migraine     Chronic pain     CKD (chronic kidney disease) stage 4, GFR 15-29 ml/min     [facility]    CKD (chronic kidney disease) stage 4, GFR 15-29 ml/min     Diabetes mellitus     Long term use of Insulin, Diabetic Neuropathy    Dialysis patient 1/21/2022    Fibromyalgia     Hydronephrosis     Hyperlipidemia     Hypertension 12/12/2012    Hypothyroidism 12/12/2012    ARON (iron deficiency anemia)     Insomnia     Levoscoliosis     Malignant neoplasm of upper-outer quadrant of left breast in female, estrogen receptor positive     Metabolic acidosis     Mobility impaired     Nuclear sclerosis - Both Eyes 3/24/2014    VIJAYA (obstructive sleep apnea)     Osteopenia     Pulmonary nodule     Recurrent UTI     Renal manifestation of secondary diabetes mellitus     Secondary hyperparathyroidism, renal     Urinary retention        Past Surgical History:   Procedure Laterality Date    ANGIOGRAPHY OF UPPER EXTREMITY N/A 9/19/2024    Procedure: Angiogram Extremity Bilateral;  Surgeon: RODRIGO Friedman III, MD;  [facility];  Service: Vascular;  Laterality: N/A;  R femoral approach, arch & arm angiogram  168.76mgy  33.1145 gycm2  8.9min    AV FISTULA PLACEMENT Left 2/14/2024    Procedure: CREATION, AV FISTULA;  Surgeon: RODRIGO Friedman III, MD;  Location: [facility] OR 2ND FLR;  Service: Vascular;  Laterality: Left;  LUE AVF creation vs AVG insertion    BIOPSY OF URETER Left 2/18/2022    Procedure: BIOPSY, URETER;  Surgeon: Ronel Whittington MD;  Location: [facility] OR 1ST FLR;  Service: Urology;  Laterality: Left;    BREAST BIOPSY Right     benign    CHOLECYSTECTOMY      COLONOSCOPY N/A 1/13/2017    Procedure: COLONOSCOPY;  Surgeon: Morris Wiseman MD;  Location: [facility] ENDO (4TH FLR);  Service: Endoscopy;  Laterality: N/A;  Renal pt Nephrology advised to avoid phosphate preps    COLONOSCOPY N/A 12/7/2023    Procedure: COLONOSCOPY;  Surgeon: Herve Allen  MD TIM;  Location: [facility] ENDO (2ND FLR);  Service: Endoscopy;  Laterality: N/A;  HD MWF; labs prior  suprapubic catheter  pt does not ambulate-uses christina lift- will have sling with her  9/7 ref. by Anastasiia Campbell DO, PEG, instr. mailed, Eliquis hold approval pending-st  ok to hold Eliquis 2 days per Dr Navarro-GT  1/30-precall complete-MS    CYSTOSCOPY N/A 10/8/2018    Procedure: CYSTOSCOPY;  Surgeon: Ronel Whittington MD;  Location: [facility] OR 1ST FLR;  Service: Urology;  Laterality: N/A;  45 min    CYSTOSCOPY N/A 3/25/2019    Procedure: CYSTOSCOPY;  Surgeon: Ronel Whittington MD;  Location: [facility] OR 1ST FLR;  Service: Urology;  Laterality: N/A;  45 min    CYSTOSCOPY N/A 8/26/2019    Procedure: CYSTOSCOPY;  Surgeon: Ronel Whittington MD;  [facility];  Service: Urology;  Laterality: N/A;  45 min    CYSTOSCOPY N/A 7/2/2021    Procedure: CYSTOSCOPY;  Surgeon: Ronel Whittington MD;  [facility];  Service: Urology;  Laterality: N/A;    CYSTOSCOPY  12/23/2021    Procedure: CYSTOSCOPY;  Surgeon: Ronel Whittington MD;  [facility];  Service: Urology;;    CYSTOSCOPY  2/18/2022    Procedure: CYSTOSCOPY;  Surgeon: Ronel Whittington MD;  [facility];  Service: Urology;;    CYSTOSCOPY W/ URETERAL STENT PLACEMENT Left 5/15/2021    Procedure: CYSTOSCOPY, WITH URETERAL STENT INSERTION;  Surgeon: Levy Sánchez Jr., MD;  Location: [facility] OR [facility] FLR;  Service: Urology;  Laterality: Left;    CYSTOSCOPY WITH BIOPSY OF BLADDER N/A 1/27/2020    Procedure: CYSTOSCOPY, WITH BLADDER BIOPSY, WITH FULGURATION IF INDICATED;  Surgeon: Ronel Whittington MD;  Location: [facility] OR [facility] FLR;  Service: Urology;  Laterality: N/A;    DILATION AND CURETTAGE OF UTERUS      FISTULOGRAM N/A 4/29/2024    Procedure: Fistulogram;  Surgeon: RODRIGO Friedman III, MD;  Location: [facility] CATH LAB;  Service: Peripheral Vascular;  Laterality: N/A;    GALLBLADDER SURGERY  2006     HYSTERECTOMY      INJECTION FOR SENTINEL NODE IDENTIFICATION Left 8/1/2019    Procedure: INJECTION, FOR SENTINEL NODE IDENTIFICATION;  Surgeon: Samia Fulton MD;  Location: [facility] OR 2ND FLR;  Service: General;  Laterality: Left;    INJECTION OF BOTULINUM TOXIN TYPE A N/A 10/8/2018    Procedure: INJECTION, BOTULINUM TOXIN, TYPE A 300 UNITS;  Surgeon: Ronel Whittington MD;  [facility];  Service: Urology;  Laterality: N/A;    INJECTION OF BOTULINUM TOXIN TYPE A N/A 3/25/2019    Procedure: INJECTION, BOTULINUM TOXIN, TYPE A 300 UNITS;  Surgeon: Ronel Whittington MD;  [facility];  Service: Urology;  Laterality: N/A;    INJECTION OF BOTULINUM TOXIN TYPE A N/A 8/26/2019    Procedure: INJECTION, BOTULINUM TOXIN, TYPE A 300 UNITS;  Surgeon: Ronel Whittington MD;  Location: [facility] OR [address] [place] [address];  Service: Urology;  Laterality: N/A;    MASTECTOMY Left 8/1/2019    Procedure: MASTECTOMY 23 hour stay;  Surgeon: Samia Fulton MD;  Location: [facility] OR 2ND FLR;  Service: General;  Laterality: Left;    MEDIPORT REMOVAL Right 6/24/2022    Procedure: REMOVAL, CATHETER, CENTRAL VENOUS, TUNNELED, WITH PORT;  Surgeon: Isauro Anderson MD;  Location: [facility] CATH LAB;  Service: Radiology;  Laterality: Right;    OVARIAN CYST SURGERY  1985    PERCUTANEOUS TRANSLUMINAL ANGIOPLASTY OF ARTERIOVENOUS FISTULA Left 4/29/2024    Procedure: PTA, AV FISTULA;  Surgeon: RODRIGO Friedman III, MD;  Location: [facility] CATH LAB;  Service: Peripheral Vascular;  Laterality: Left;    REPLACEMENT OF STENT Left 12/23/2021    Procedure: REPLACEMENT, STENT;  Surgeon: Ronel Whittington MD;  [facility];  Service: Urology;  Laterality: Left;    REPLACEMENT OF STENT Left 2/18/2022    Procedure: REPLACEMENT, STENT;  Surgeon: Ronel Whittington MD;  Location: [facility] OR [address] [place] [address];  Service: Urology;  Laterality: Left;    RETROGRADE PYELOGRAPHY Left 2/18/2022    Procedure: PYELOGRAM, RETROGRADE;  Surgeon: Ronel FRAZIER  MD Pk;  Location: [facility] OR [address] [place] [address];  Service: Urology;  Laterality: Left;    SENTINEL LYMPH NODE BIOPSY Left 8/1/2019    Procedure: BIOPSY, LYMPH NODE, SENTINEL;  Surgeon: Samia Fulton MD;  Location: [facility] OR 2ND [facility];  Service: General;  Laterality: Left;    spt placement      TONSILLECTOMY, ADENOIDECTOMY      TOTAL ABDOMINAL HYSTERECTOMY W/ BILATERAL SALPINGOOPHORECTOMY  1985    URETEROSCOPY Left 2/18/2022    Procedure: URETEROSCOPY;  Surgeon: Ronel Whittington MD;  Location: [facility] OR [address] [place] [address];  Service: Urology;  Laterality: Left;       Review of patient's allergies indicates:  No Known Allergies  Current Facility-Administered Medications   Medication Frequency    acetaminophen tablet 650 mg Q4H PRN    [START ON 1/1/2025] anastrozole tablet 1 mg Daily    apixaban tablet 5 mg BID    [START ON 1/1/2025] atorvastatin tablet 80 mg Daily    dextrose 50% injection 12.5 g PRN    dextrose 50% injection 12.5 g PRN    dextrose 50% injection 25 g PRN    dextrose 50% injection 25 g PRN    [START ON 1/1/2025] DULoxetine DR capsule 40 mg Daily    glucagon (human recombinant) injection 1 mg PRN    glucagon (human recombinant) injection 1 mg PRN    glucose chewable tablet 16 g PRN    glucose chewable tablet 16 g PRN    glucose chewable tablet 24 g PRN    glucose chewable tablet 24 g PRN    insulin aspart U-100 pen 0-5 Units QID (AC + HS) PRN    ipratropium 42 mcg (0.06 %) nasal spray 2 spray BID    [START ON 1/1/2025] levothyroxine tablet 50 mcg Before breakfast    methocarbamoL tablet 750 mg TID PRN    naloxone 0.4 mg/mL injection 0.02 mg PRN    ondansetron injection 8 mg Q6H PRN    [START ON 1/1/2025] oxybutynin 24 hr tablet 10 mg Daily    piperacillin-tazobactam (ZOSYN) 4.5 g in D5W 100 mL IVPB (MB+) Q8H    polyethylene glycol packet 17 g Daily PRN    [START ON 1/1/2025] sodium bicarbonate tablet 650 mg Daily    sodium chloride 0.9% flush 5 mL PRN    [START ON 1/1/2025] sodium zirconium cyclosilicate packet 5 g  "Daily    traZODone tablet 100 mg Nightly PRN     Current Outpatient Medications   Medication    ELIQUIS 5 mg Tab    acetaminophen (TYLENOL) 325 MG tablet    amLODIPine (NORVASC) 10 MG tablet    anastrozole (ARIMIDEX) 1 mg Tab    atorvastatin (LIPITOR) 80 MG tablet    BD INSULIN SYRINGE ULTRA-FINE 0.5 mL 31 gauge x 5/16" Syrg    calcium acetate,phosphat bind, (PHOSLO) 667 mg capsule    carvediloL (COREG) 12.5 MG tablet    diclofenac sodium (VOLTAREN) 1 % Gel    DULoxetine (CYMBALTA) 20 MG capsule    erenumab-aooe (AIMOVIG AUTOINJECTOR) 140 mg/mL autoinjector    ergocalciferol (ERGOCALCIFEROL) 50,000 unit Cap    furosemide (LASIX) 40 MG tablet    HYDROcodone-acetaminophen (NORCO)  mg per tablet    ipratropium (ATROVENT) 21 mcg (0.03 %) nasal spray    lactulose (CHRONULAC) 20 gram/30 mL Soln    LANTUS SOLOSTAR U-100 INSULIN 100 unit/mL (3 mL) InPn pen    losartan (COZAAR) 100 MG tablet    methocarbamoL (ROBAXIN) 750 MG Tab    ondansetron (ZOFRAN-ODT) 8 MG TbDL    oxybutynin (DITROPAN-XL) 10 MG 24 hr tablet    oxyCODONE (ROXICODONE) 5 MG immediate release tablet    patiromer calcium sorbitex (VELTASSA) 16.8 gram PwPk    pen needle, diabetic (BD ULTRA-FINE SHORT PEN NEEDLE) 31 gauge x 5/16" Ndle    silver sulfADIAZINE 1% (SILVADENE) 1 % cream    sodium bicarbonate 650 MG tablet    sumatriptan (IMITREX) 100 MG tablet    SYNTHROID 50 mcg tablet    traZODone (DESYREL) 100 MG tablet    UNABLE TO FIND    VELPHORO 500 mg Chew     Facility-Administered Medications Ordered in Other Encounters   Medication Frequency    epoetin chloe injection 2,000 Units 1 time in Clinic/HOD    heparin (porcine) injection 2,000 Units Once    heparin (porcine) injection 2,000 Units Once    heparin (porcine) injection 4,000 Units 1 time in Clinic/HOD    iron sucrose injection 100 mg 1 time in Clinic/HOD     Family History       Problem Relation (Age of Onset)    ALS Mother    Cancer Maternal Grandmother, Paternal Grandfather, Brother    " Diabetes Sister, Maternal Aunt, Maternal Uncle    Kidney disease Sister, Maternal Aunt    Prostate cancer Brother    Scoliosis Brother          Tobacco Use    Smoking status: Never    Smokeless tobacco: Never   Substance and Sexual Activity    Alcohol use: No     Alcohol/week: 0.0 standard drinks of alcohol    Drug use: No    Sexual activity: Not Currently     Birth control/protection: Abstinence     Review of Systems   Constitutional:  Positive for fever.   HENT:  Negative for congestion.    Respiratory:  Negative for shortness of breath.    Cardiovascular:  Negative for chest pain.   Gastrointestinal:  Negative for abdominal pain, nausea and vomiting.   Genitourinary:  Positive for decreased urine volume (ESRD).     Objective:     Vital Signs (Most Recent):  Temp: 99.1 °F (37.3 °C) (12/31/24 1148)  Pulse: 104 (12/31/24 1401)  Resp: 18 (12/31/24 1401)  BP: (!) 163/74 (12/31/24 1401)  SpO2: 98 % (12/31/24 1401) Vital Signs (24h Range):  Temp:  [99.1 °F (37.3 °C)-101.7 °F (38.7 °C)] 99.1 °F (37.3 °C)  Pulse:  [] 104  Resp:  [18-20] 18  SpO2:  [95 %-98 %] 98 %  BP: (122-176)/(59-77) 163/74     Weight: 98 kg (216 lb) (12/31/24 0909)  Body mass index is 32.84 kg/m².  Body surface area is 2.17 meters squared.    No intake/output data recorded.     Physical Exam  Vitals reviewed.   Constitutional:       Appearance: She is ill-appearing.   HENT:      Head: Normocephalic.   Eyes:      Conjunctiva/sclera: Conjunctivae normal.   Cardiovascular:      Rate and Rhythm: Tachycardia present.      Arteriovenous access: Left arteriovenous access is present.     Comments: LUE AVF + thrill  Pulmonary:      Effort: Pulmonary effort is normal. No respiratory distress.   Chest:      Comments: TDC R chest wall with no surrounding erythema  Abdominal:      Palpations: Abdomen is soft.      Tenderness: There is no abdominal tenderness.   Musculoskeletal:      Right lower leg: Edema present.      Left lower leg: Edema present.  Medical Necessity Clause: This procedure was medically necessary because the lesions that were treated were:   Skin:     General: Skin is warm and dry.   Neurological:      Mental Status: She is alert.   Psychiatric:         Mood and Affect: Mood normal.          Significant Labs:  CBC:   Recent Labs   Lab 12/31/24  0937   WBC 20.72*   RBC 3.76*   HGB 11.3*   HCT 36.5*      MCV 97   MCH 30.1   MCHC 31.0*     CMP:   Recent Labs   Lab 12/31/24  0937   *   CALCIUM 8.9   ALBUMIN 3.0*   PROT 8.0   *   K 4.1   CO2 17*   CL 98   BUN 28*   CREATININE 3.2*   ALKPHOS 158*   ALT 12   AST 12   BILITOT 0.4     All labs within the past 24 hours have been reviewed.     Skin normal color for race, warm, dry and intact. No evidence of rash.

## 2024-12-31 NOTE — ASSESSMENT & PLAN NOTE
Hyponatremia is likely due to volume overload from ESRD. The patient's most recent sodium results are listed below.  Recent Labs     12/31/24  0937   *     Plan  - Correct the sodium by 4-6mEq in 24 hours.   - Will treat the hyponatremia with HD  - Monitor sodium Daily.   - Patient hyponatremia is stable

## 2024-12-31 NOTE — ASSESSMENT & PLAN NOTE
Body mass index is 32.84 kg/m². Morbid obesity complicates all aspects of disease management from diagnostic modalities to treatment. Weight loss encouraged and health benefits explained to patient.

## 2025-01-01 PROBLEM — B95.5 STREPTOCOCCAL BACTEREMIA: Status: ACTIVE | Noted: 2021-09-28

## 2025-01-01 PROBLEM — B95.2 BACTEREMIA DUE TO ENTEROCOCCUS: Status: ACTIVE | Noted: 2021-09-28

## 2025-01-01 LAB
ACINETOBACTER CALCOACETICUS/BAUMANNII COMPLEX: NOT DETECTED
ALBUMIN SERPL BCP-MCNC: 2.2 G/DL (ref 3.5–5.2)
ALP SERPL-CCNC: 122 U/L (ref 40–150)
ALT SERPL W/O P-5'-P-CCNC: 8 U/L (ref 10–44)
ANION GAP SERPL CALC-SCNC: 13 MMOL/L (ref 8–16)
AST SERPL-CCNC: 10 U/L (ref 10–40)
BACTEROIDES FRAGILIS: NOT DETECTED
BASOPHILS # BLD AUTO: 0.03 K/UL (ref 0–0.2)
BASOPHILS NFR BLD: 0.2 % (ref 0–1.9)
BILIRUB SERPL-MCNC: 0.3 MG/DL (ref 0.1–1)
BUN SERPL-MCNC: 38 MG/DL (ref 8–23)
CALCIUM SERPL-MCNC: 8 MG/DL (ref 8.7–10.5)
CANDIDA ALBICANS: NOT DETECTED
CANDIDA AURIS: NOT DETECTED
CANDIDA GLABRATA: NOT DETECTED
CANDIDA KRUSEI: NOT DETECTED
CANDIDA PARAPSILOSIS: NOT DETECTED
CANDIDA TROPICALIS: NOT DETECTED
CHLORIDE SERPL-SCNC: 100 MMOL/L (ref 95–110)
CO2 SERPL-SCNC: 17 MMOL/L (ref 23–29)
CREAT SERPL-MCNC: 4.3 MG/DL (ref 0.5–1.4)
CRYPTOCOCCUS NEOFORMANS/GATTII: NOT DETECTED
CTX-M GENE (ESBL PRODUCER): ABNORMAL
DIFFERENTIAL METHOD BLD: ABNORMAL
ENTEROBACTER CLOACAE COMPLEX: NOT DETECTED
ENTEROBACTERALES: NOT DETECTED
ENTEROCOCCUS FAECALIS: DETECTED
ENTEROCOCCUS FAECIUM: NOT DETECTED
EOSINOPHIL # BLD AUTO: 0.2 K/UL (ref 0–0.5)
EOSINOPHIL NFR BLD: 1.3 % (ref 0–8)
ERYTHROCYTE [DISTWIDTH] IN BLOOD BY AUTOMATED COUNT: 13.1 % (ref 11.5–14.5)
ESCHERICHIA COLI: NOT DETECTED
EST. GFR  (NO RACE VARIABLE): 10.4 ML/MIN/1.73 M^2
GLUCOSE SERPL-MCNC: 159 MG/DL (ref 70–110)
HAEMOPHILUS INFLUENZAE: NOT DETECTED
HCT VFR BLD AUTO: 27.8 % (ref 37–48.5)
HGB BLD-MCNC: 8.7 G/DL (ref 12–16)
IMM GRANULOCYTES # BLD AUTO: 0.1 K/UL (ref 0–0.04)
IMM GRANULOCYTES NFR BLD AUTO: 0.8 % (ref 0–0.5)
IMP GENE (CARBAPENEM RESISTANT): ABNORMAL
KLEBSIELLA AEROGENES: NOT DETECTED
KLEBSIELLA OXYTOCA: NOT DETECTED
KLEBSIELLA PNEUMONIAE GROUP: NOT DETECTED
KPC RESISTANCE GENE (CARBAPENEM): ABNORMAL
LISTERIA MONOCYTOGENES: NOT DETECTED
LYMPHOCYTES # BLD AUTO: 1.6 K/UL (ref 1–4.8)
LYMPHOCYTES NFR BLD: 13.2 % (ref 18–48)
MAGNESIUM SERPL-MCNC: 1.6 MG/DL (ref 1.6–2.6)
MCH RBC QN AUTO: 30.2 PG (ref 27–31)
MCHC RBC AUTO-ENTMCNC: 31.3 G/DL (ref 32–36)
MCR-1: ABNORMAL
MCV RBC AUTO: 97 FL (ref 82–98)
MEC A/C AND MREJ (MRSA): ABNORMAL
MEC A/C: ABNORMAL
MONOCYTES # BLD AUTO: 0.9 K/UL (ref 0.3–1)
MONOCYTES NFR BLD: 7.1 % (ref 4–15)
NDM GENE (CARBAPENEM RESISTANT): ABNORMAL
NEISSERIA MENINGITIDIS: NOT DETECTED
NEUTROPHILS # BLD AUTO: 9.5 K/UL (ref 1.8–7.7)
NEUTROPHILS NFR BLD: 77.4 % (ref 38–73)
NRBC BLD-RTO: 0 /100 WBC
OXA-48-LIKE (CARBAPENEM RESISTANT): ABNORMAL
PHOSPHATE SERPL-MCNC: 3.7 MG/DL (ref 2.7–4.5)
PLATELET # BLD AUTO: 254 K/UL (ref 150–450)
PMV BLD AUTO: 9 FL (ref 9.2–12.9)
POCT GLUCOSE: 164 MG/DL (ref 70–110)
POCT GLUCOSE: 192 MG/DL (ref 70–110)
POCT GLUCOSE: 233 MG/DL (ref 70–110)
POTASSIUM SERPL-SCNC: 3.4 MMOL/L (ref 3.5–5.1)
PROT SERPL-MCNC: 5.9 G/DL (ref 6–8.4)
PROTEUS SPECIES: NOT DETECTED
PSEUDOMONAS AERUGINOSA: NOT DETECTED
RBC # BLD AUTO: 2.88 M/UL (ref 4–5.4)
SALMONELLA SP: NOT DETECTED
SERRATIA MARCESCENS: NOT DETECTED
SODIUM SERPL-SCNC: 130 MMOL/L (ref 136–145)
STAPHYLOCOCCUS AUREUS: NOT DETECTED
STAPHYLOCOCCUS EPIDERMIDIS: NOT DETECTED
STAPHYLOCOCCUS LUGDUNESIS: NOT DETECTED
STAPHYLOCOCCUS SPECIES: NOT DETECTED
STENOTROPHOMONAS MALTOPHILIA: NOT DETECTED
STREPTOCOCCUS AGALACTIAE: NOT DETECTED
STREPTOCOCCUS PNEUMONIAE: NOT DETECTED
STREPTOCOCCUS PYOGENES: NOT DETECTED
STREPTOCOCCUS SPECIES: NOT DETECTED
VAN A/B (VRE GENE): NOT DETECTED
VIM GENE (CARBAPENEM RESISTANT): ABNORMAL
WBC # BLD AUTO: 12.28 K/UL (ref 3.9–12.7)

## 2025-01-01 PROCEDURE — 36415 COLL VENOUS BLD VENIPUNCTURE: CPT | Mod: HCNC | Performed by: HOSPITALIST

## 2025-01-01 PROCEDURE — 80053 COMPREHEN METABOLIC PANEL: CPT | Mod: HCNC | Performed by: HOSPITALIST

## 2025-01-01 PROCEDURE — 63600175 PHARM REV CODE 636 W HCPCS: Mod: HCNC

## 2025-01-01 PROCEDURE — 83735 ASSAY OF MAGNESIUM: CPT | Mod: HCNC | Performed by: HOSPITALIST

## 2025-01-01 PROCEDURE — 5A1D70Z PERFORMANCE OF URINARY FILTRATION, INTERMITTENT, LESS THAN 6 HOURS PER DAY: ICD-10-PCS | Performed by: HOSPITALIST

## 2025-01-01 PROCEDURE — 25000003 PHARM REV CODE 250: Mod: HCNC

## 2025-01-01 PROCEDURE — 63600175 PHARM REV CODE 636 W HCPCS: Performed by: HOSPITALIST

## 2025-01-01 PROCEDURE — 99223 1ST HOSP IP/OBS HIGH 75: CPT | Mod: ,,, | Performed by: INTERNAL MEDICINE

## 2025-01-01 PROCEDURE — 84100 ASSAY OF PHOSPHORUS: CPT | Mod: HCNC | Performed by: HOSPITALIST

## 2025-01-01 PROCEDURE — 85025 COMPLETE CBC W/AUTO DIFF WBC: CPT | Mod: HCNC | Performed by: HOSPITALIST

## 2025-01-01 PROCEDURE — 90935 HEMODIALYSIS ONE EVALUATION: CPT | Mod: HCNC,,,

## 2025-01-01 PROCEDURE — 25000003 PHARM REV CODE 250: Mod: HCNC | Performed by: HOSPITALIST

## 2025-01-01 PROCEDURE — 21400001 HC TELEMETRY ROOM: Mod: HCNC

## 2025-01-01 RX ORDER — HEPARIN SODIUM 1000 [USP'U]/ML
1000 INJECTION, SOLUTION INTRAVENOUS; SUBCUTANEOUS
Status: DISCONTINUED | OUTPATIENT
Start: 2025-01-01 | End: 2025-01-11 | Stop reason: HOSPADM

## 2025-01-01 RX ORDER — HYDROCODONE BITARTRATE AND ACETAMINOPHEN 10; 325 MG/1; MG/1
1 TABLET ORAL EVERY 4 HOURS PRN
Status: DISCONTINUED | OUTPATIENT
Start: 2025-01-01 | End: 2025-01-11 | Stop reason: HOSPADM

## 2025-01-01 RX ADMIN — APIXABAN 5 MG: 5 TABLET, FILM COATED ORAL at 08:01

## 2025-01-01 RX ADMIN — ACETAMINOPHEN 650 MG: 325 TABLET ORAL at 09:01

## 2025-01-01 RX ADMIN — PIPERACILLIN SODIUM AND TAZOBACTAM SODIUM 4.5 G: 4; .5 INJECTION, POWDER, LYOPHILIZED, FOR SOLUTION INTRAVENOUS at 08:01

## 2025-01-01 RX ADMIN — DULOXETINE HYDROCHLORIDE 40 MG: 20 CAPSULE, DELAYED RELEASE ORAL at 10:01

## 2025-01-01 RX ADMIN — OXYBUTYNIN CHLORIDE 10 MG: 10 TABLET, EXTENDED RELEASE ORAL at 09:01

## 2025-01-01 RX ADMIN — APIXABAN 5 MG: 5 TABLET, FILM COATED ORAL at 09:01

## 2025-01-01 RX ADMIN — PIPERACILLIN SODIUM AND TAZOBACTAM SODIUM 4.5 G: 4; .5 INJECTION, POWDER, LYOPHILIZED, FOR SOLUTION INTRAVENOUS at 09:01

## 2025-01-01 RX ADMIN — METHOCARBAMOL 750 MG: 750 TABLET ORAL at 09:01

## 2025-01-01 RX ADMIN — ACETAMINOPHEN 650 MG: 325 TABLET ORAL at 01:01

## 2025-01-01 RX ADMIN — HYDROCODONE BITARTRATE AND ACETAMINOPHEN 1 TABLET: 10; 325 TABLET ORAL at 03:01

## 2025-01-01 RX ADMIN — IPRATROPIUM BROMIDE 2 SPRAY: 42 SPRAY, METERED NASAL at 09:01

## 2025-01-01 RX ADMIN — ATORVASTATIN CALCIUM 80 MG: 40 TABLET, FILM COATED ORAL at 09:01

## 2025-01-01 RX ADMIN — HYDROCODONE BITARTRATE AND ACETAMINOPHEN 1 TABLET: 10; 325 TABLET ORAL at 10:01

## 2025-01-01 RX ADMIN — LEVOTHYROXINE SODIUM 50 MCG: 0.05 TABLET ORAL at 06:01

## 2025-01-01 RX ADMIN — ANASTROZOLE 1 MG: 1 TABLET, COATED ORAL at 09:01

## 2025-01-01 RX ADMIN — HYDROCODONE BITARTRATE AND ACETAMINOPHEN 1 TABLET: 10; 325 TABLET ORAL at 08:01

## 2025-01-01 RX ADMIN — ONDANSETRON 8 MG: 2 INJECTION INTRAMUSCULAR; INTRAVENOUS at 02:01

## 2025-01-01 RX ADMIN — SODIUM BICARBONATE 650 MG TABLET 650 MG: at 09:01

## 2025-01-01 RX ADMIN — HEPARIN SODIUM 1000 UNITS: 1000 INJECTION, SOLUTION INTRAVENOUS; SUBCUTANEOUS at 06:01

## 2025-01-01 RX ADMIN — PIPERACILLIN SODIUM AND TAZOBACTAM SODIUM 4.5 G: 4; .5 INJECTION, POWDER, LYOPHILIZED, FOR SOLUTION INTRAVENOUS at 01:01

## 2025-01-01 NOTE — PROGRESS NOTES
1515 Arterial needle unable to tolerate flows, moved to Permcath; AMY GIL notified    1526 Venous needle infiltrating, line moved to permcath. Ice applied to area and needles removed; AMY GIL aware

## 2025-01-01 NOTE — ASSESSMENT & PLAN NOTE
Hyponatremia is likely due to volume overload from ESRD. The patient's most recent sodium results are listed below.  Recent Labs     12/31/24  0937 01/01/25  0419   * 130*       Plan  - Correct the sodium by 4-6mEq in 24 hours.   - Will treat the hyponatremia with HD  - Monitor sodium Daily.   - Patient hyponatremia is stable

## 2025-01-01 NOTE — ASSESSMENT & PLAN NOTE
Creatine stable for now. BMP reviewed- noted Estimated Creatinine Clearance: 14.4 mL/min (A) (based on SCr of 4.3 mg/dL (H)). according to latest data. Based on current GFR, CKD stage is end stage.  Monitor UOP and serial BMP and adjust therapy as needed. Renally dose meds. Avoid nephrotoxic medications and procedures.    Nephro consult  Has been getting HD MF via right IJ PermCath  Needs f/u with Vascular Surgery for IJ removal

## 2025-01-01 NOTE — HOSPITAL COURSE
Ms. Bowen is a 74 y.o. female with ESRD on HD, T2DM, afib on Eliquis, HTN, obesity, history of breast cancer, chronic suprapubic catheter for neurogenic bladder and chronic back pain who was admitted for severe sepsis 2/2 Enterococcus bacteremia and suprapubic UTI.  She was started on Zosyn.  After her blood cx returned positive, ID was consulted and she was started on Dapto for Enterococcus. Switched to Vanc . Plan for 8 weeks of Vanc with HD from discharge Urine cx returned with ESBL Klebsiella and Proteus, unclear if this is a UTI or just colonization. S/p one dose of gentamicin per ID Had issues with AVF for HD during stay. Vascular surgery was consulted s/p fistulogram 1/6 with good flow. RN unable to cannulate AVF with arterial and venous needle despite fistulogram  s/p line removal by IR today for 24-48 hr line holiday; Plan for TDC placement by IR in am NPO after MN will need OP follow up with Vascular    1/9 Severe sepsis 2/2 Enterococcus bacteremia and suprapubic UTI.   ID was consulted and she was started on Dapto for Enterococcus. Switched to Vancomycin .  Had issues with AVF for HD during stay. Vascular surgery was consulted s/p fistulogram 1/6 with good flow. RN unable to cannulate AVF with arterial and venous needle despite fistulogram.   s/p line removal by IR 01/07 for 24-48 hr line holiday; Plan for 8 weeks of Vanc with HD from discharge per ID recs.   Vancomycin 500 mg IV after HD. Re-dose when the random level is less than 20. s/p DC placement by IR today. HD today after TDC placement. sodium trended down to 123. Fluid restriction 1L/24h . UF with goal 2L today. needs HD today after TDC placement. hold eliquis today for TDC.  needs OP follow up with Vascular surgery   1/10 sodium 125 .HD today   1/11 sats 98% on RA. sodium improved from  125 to 127.  Nephrology follow up - started lasix 80mg PO QDaily. Follow up with outpatient HD on 1/12. Discharge home

## 2025-01-01 NOTE — PROGRESS NOTES
[facility]     Patient currently on hemodialysis for removal of uremic toxins and volume.     Patient seen and evaluated on hemodialysis, tolerating treatment, see HD flowsheet for vitals and assessments.     Labs have been reviewed and the dialysate bath has been adjusted.        Assessment/Plan:     -Patient seen on HD, tolerating treatment well, w/o complaints   -UF goal of 2 L as tolerated  -AVF difficult to cannulate, clot aspirated from venous, infiltrated on HD. Switched to TDC as patient has not yet had TDC removed. Recommend vascular consult for possible fistulogram.   -OK for line holiday after HD if recommended per ID  -Renal diet, if not NPO   -Strict I/O's and daily weights  -Daily renal function panels  -Keep MAP >65 while on HD   -Hgb goal 10-11, hgb 8.3 from 11.3, trend hgb, transfuse for hgb < 7.0  -Phos controlled not on binders  -Will continue to follow while inpatient      Jordyn Herrera PA-C  Nephrology

## 2025-01-01 NOTE — CONSULTS
[facility] - Internal [facility] Telemetry  Infectious Disease  Consult Note    Patient Name: Cecile Bowen  MRN: [id number]  Admission Date: 12/31/2024  Hospital Length of Stay: 1 days  Attending Physician: Fariha Abraham MD  Primary Care Provider: Lurdes Navarro MD     Isolation Status: No active isolations    Patient information was obtained from patient, past medical records, and ER records.      Consults: ID for bacteremia    Assessment/Plan:     ID  * Severe sepsis  Ms. Bowen is a pleasant 73 yo woman with ESRD admitted with enterococcal sepsis. Etiology is likely her urine and urine culture is pending. If not, then her Permacath would be the next best bet.    Recommendations  Continue abx - Zosyn and daptomycin for Enterococcus pending AST (plan to de-escalate once final AST has returned, if not sooner)  Repeat blood cultures  If her urine does not grow Enterococcus or if her bacteremia fails to clear, would remove her Permacath to help clear her bacteremia    Thank you for your consult. I will follow-up with patient. Please contact us if you have any additional questions.    Dominic Darby MD  Infectious Disease  [facility] - Internal [facility] Telemetry    Subjective:     Principal Problem: Severe sepsis    HPI: Ms. Bowen is a pleasant 74 y.o. woman with ESRD on HD,and chronic suprapubic catheter for neurogenic bladder who presented to the [facility] ED 12/31 from Vascular Surgery for evaluation of fever and AMS.  She reports that she was well until she went to see her Vascular Surgeon for right IJ PermCath removal. She is currently dialyzing only Mondays and Fridays via right IJ PermCath.  However, upon arrival to clinic she was febrile to 100.9F, slow to respond and tachy to 104.  She was sent to the ER for further evaluation.  In the ED, she was febrile. Blood and urine studies were obtained and the patient was admitted with likely sepsis. Today, her blood cultures are growing GPC in chains,  likely Enterococcus by BCID. ID is consulted for bacteremia. Today, the patient is clear and feeling better. She tells me that she usually notices a change in the color of her urine when she has a UTI and that her urine has been clear. No rigors today. Her Permacath remains in place.    Past Medical History:   Diagnosis Date    Cervicogenic migraine     Chronic pain     CKD (chronic kidney disease) stage 4, GFR 15-29 ml/min     [facility]    CKD (chronic kidney disease) stage 4, GFR 15-29 ml/min     Diabetes mellitus     Long term use of Insulin, Diabetic Neuropathy    Dialysis patient 1/21/2022    Fibromyalgia     Hydronephrosis     Hyperlipidemia     Hypertension 12/12/2012    Hypothyroidism 12/12/2012    ARON (iron deficiency anemia)     Insomnia     Levoscoliosis     Malignant neoplasm of upper-outer quadrant of left breast in female, estrogen receptor positive     Metabolic acidosis     Mobility impaired     Nuclear sclerosis - Both Eyes 3/24/2014    VIJAYA (obstructive sleep apnea)     Osteopenia     Pulmonary nodule     Recurrent UTI     Renal manifestation of secondary diabetes mellitus     Secondary hyperparathyroidism, renal     Urinary retention        Past Surgical History:   Procedure Laterality Date    ANGIOGRAPHY OF UPPER EXTREMITY N/A 9/19/2024    Procedure: Angiogram Extremity Bilateral;  Surgeon: RODRIGO Friedman III, MD;  Location: [facility] OR [address] [place] [address];  Service: Vascular;  Laterality: N/A;  R femoral approach, arch & arm angiogram  168.76mgy  33.1145 gycm2  8.9min    AV FISTULA PLACEMENT Left 2/14/2024    Procedure: CREATION, AV FISTULA;  Surgeon: RODRIGO Friedman III, MD;  [facility];  Service: Vascular;  Laterality: Left;  LUE AVF creation vs AVG insertion    BIOPSY OF URETER Left 2/18/2022    Procedure: BIOPSY, URETER;  Surgeon: Ronel Whittington MD;  [facility];  Service: Urology;  Laterality: Left;    BREAST BIOPSY Right     benign    CHOLECYSTECTOMY       COLONOSCOPY N/A 1/13/2017    Procedure: COLONOSCOPY;  Surgeon: Morris Wiseman MD;  Location: [facility] ENDO (4TH FLR);  Service: Endoscopy;  Laterality: N/A;  Renal pt Nephrology advised to avoid phosphate preps    COLONOSCOPY N/A 12/7/2023    Procedure: COLONOSCOPY;  Surgeon: Herve Allen MD;  Location: [facility] ENDO (2ND FLR);  Service: Endoscopy;  Laterality: N/A;  HD MWF; labs prior  suprapubic catheter  pt does not ambulate-uses christina lift- will have sling with her  9/7 ref. by Anastasiia Campbell, , PEG, instr. mailed, Eliquis hold approval pending-[facility] to hold Eliquis 2 days per Dr Navarro-GT  1/30-precall complete-MS    CYSTOSCOPY N/A 10/8/2018    Procedure: CYSTOSCOPY;  Surgeon: Ronel Whittington MD;  Location: [facility] OR 1ST FLR;  Service: Urology;  Laterality: N/A;  45 min    CYSTOSCOPY N/A 3/25/2019    Procedure: CYSTOSCOPY;  Surgeon: Ronel Whittington MD;  [facility];  Service: Urology;  Laterality: N/A;  45 min    CYSTOSCOPY N/A 8/26/2019    Procedure: CYSTOSCOPY;  Surgeon: Ronel Whittington MD;  [facility];  Service: Urology;  Laterality: N/A;  45 min    CYSTOSCOPY N/A 7/2/2021    Procedure: CYSTOSCOPY;  Surgeon: Ronel Whittington MD;  [facility];  Service: Urology;  Laterality: N/A;    CYSTOSCOPY  12/23/2021    Procedure: CYSTOSCOPY;  Surgeon: Ronel Whittington MD;  Location: [facility] OR [facility] FLR;  Service: Urology;;    CYSTOSCOPY  2/18/2022    Procedure: CYSTOSCOPY;  Surgeon: Ronel Whittington MD;  Location: [facility] OR [facility] FLR;  Service: Urology;;    CYSTOSCOPY W/ URETERAL STENT PLACEMENT Left 5/15/2021    Procedure: CYSTOSCOPY, WITH URETERAL STENT INSERTION;  Surgeon: Levy Sánchez Jr., MD;  Location: [facility] OR [address] [place] [address];  Service: Urology;  Laterality: Left;    CYSTOSCOPY WITH BIOPSY OF BLADDER N/A 1/27/2020    Procedure: CYSTOSCOPY, WITH BLADDER BIOPSY, WITH FULGURATION IF INDICATED;  Surgeon: Ronel Whittington MD;   Location: [address];  Service: Urology;  Laterality: N/A;    DILATION AND CURETTAGE OF UTERUS      FISTULOGRAM N/A 4/29/2024    Procedure: Fistulogram;  Surgeon: RODRIGO Friedman III, MD;  Location: [facility] CATH LAB;  Service: Peripheral Vascular;  Laterality: N/A;    GALLBLADDER SURGERY  2006    HYSTERECTOMY      INJECTION FOR SENTINEL NODE IDENTIFICATION Left 8/1/2019    Procedure: INJECTION, FOR SENTINEL NODE IDENTIFICATION;  Surgeon: Samia Fulton MD;  Location: [facility] OR [address] [place] [address];  Service: General;  Laterality: Left;    INJECTION OF BOTULINUM TOXIN TYPE A N/A 10/8/2018    Procedure: INJECTION, BOTULINUM TOXIN, TYPE A 300 UNITS;  Surgeon: Ronel Whittington MD;  Location: [address];  Service: Urology;  Laterality: N/A;    INJECTION OF BOTULINUM TOXIN TYPE A N/A 3/25/2019    Procedure: INJECTION, BOTULINUM TOXIN, TYPE A 300 UNITS;  Surgeon: Ronel Whittington MD;  Location: [address];  Service: Urology;  Laterality: N/A;    INJECTION OF BOTULINUM TOXIN TYPE A N/A 8/26/2019    Procedure: INJECTION, BOTULINUM TOXIN, TYPE A 300 UNITS;  Surgeon: Ronel Whittington MD;  Location: [address];  Service: Urology;  Laterality: N/A;    MASTECTOMY Left 8/1/2019    Procedure: MASTECTOMY 23 hour stay;  Surgeon: Samia Fulton MD;  Location: [address];  Service: General;  Laterality: Left;    MEDIPORT REMOVAL Right 6/24/2022    Procedure: REMOVAL, CATHETER, CENTRAL VENOUS, TUNNELED, WITH PORT;  Surgeon: Isauro Anderson MD;  Location: [facility] CATH LAB;  Service: Radiology;  Laterality: Right;    OVARIAN CYST SURGERY  1985    PERCUTANEOUS TRANSLUMINAL ANGIOPLASTY OF ARTERIOVENOUS FISTULA Left 4/29/2024    Procedure: PTA, AV FISTULA;  Surgeon: RODRIGO Friedman III, MD;  Location: [facility] CATH LAB;  Service: Peripheral Vascular;  Laterality: Left;    REPLACEMENT OF STENT Left 12/23/2021    Procedure: REPLACEMENT, STENT;  Surgeon: Ronel Whittington MD;  Location: [address];  Service:  "Urology;  Laterality: Left;    REPLACEMENT OF STENT Left 2/18/2022    Procedure: REPLACEMENT, STENT;  Surgeon: Ronel Whittington MD;  Location: [facility] OR 1ST FLR;  Service: Urology;  Laterality: Left;    RETROGRADE PYELOGRAPHY Left 2/18/2022    Procedure: PYELOGRAM, RETROGRADE;  Surgeon: Ronel Whittington MD;  Location: [facility] OR 1ST FLR;  Service: Urology;  Laterality: Left;    SENTINEL LYMPH NODE BIOPSY Left 8/1/2019    Procedure: BIOPSY, LYMPH NODE, SENTINEL;  Surgeon: Samia Fulton MD;  Location: [facility] OR 2ND FLR;  Service: General;  Laterality: Left;    spt placement      TONSILLECTOMY, ADENOIDECTOMY      TOTAL ABDOMINAL HYSTERECTOMY W/ BILATERAL SALPINGOOPHORECTOMY  1985    URETEROSCOPY Left 2/18/2022    Procedure: URETEROSCOPY;  Surgeon: Ronel Whittington MD;  [facility];  Service: Urology;  Laterality: Left;       Review of patient's allergies indicates:  No Known Allergies    Medications:  Medications Prior to Admission   Medication Sig    ELIQUIS 5 mg Tab TAKE 1 TABLET BY MOUTH TWICE A DAY    acetaminophen (TYLENOL) 325 MG tablet Take 2 tablets (650 mg total) by mouth every 6 (six) hours as needed for Pain.    amLODIPine (NORVASC) 10 MG tablet Take 10 mg by mouth.    anastrozole (ARIMIDEX) 1 mg Tab TAKE 1 TABLET BY MOUTH EVERY DAY    atorvastatin (LIPITOR) 80 MG tablet Take 1 tablet (80 mg total) by mouth once daily.    BD INSULIN SYRINGE ULTRA-FINE 0.5 mL 31 gauge x 5/16" Syrg USE WITH INSULIN 4 TIMES A DAY    calcium acetate,phosphat bind, (PHOSLO) 667 mg capsule Take 667 mg by mouth 3 (three) times daily with meals.    carvediloL (COREG) 12.5 MG tablet Take 1 tablet (12.5 mg total) by mouth 2 (two) times daily.    diclofenac sodium (VOLTAREN) 1 % Gel Apply 2 g topically once daily.    DULoxetine (CYMBALTA) 20 MG capsule Take 2 capsules (40 mg total) by mouth once daily.    erenumab-aooe (AIMOVIG AUTOINJECTOR) 140 mg/mL autoinjector 140 mg MONTHLY (route: subcutaneous)    " "ergocalciferol (ERGOCALCIFEROL) 50,000 unit Cap Take 1 capsule (50,000 Units total) by mouth every 7 days.    furosemide (LASIX) 40 MG tablet Take 1 tablet (40 mg total) by mouth once daily.    HYDROcodone-acetaminophen (NORCO)  mg per tablet Take 1 tablet by mouth every 6 (six) hours as needed for Pain.    ipratropium (ATROVENT) 21 mcg (0.03 %) nasal spray 2 sprays by Each Nostril route 2 (two) times daily.    lactulose (CHRONULAC) 20 gram/30 mL Soln Take 30 mLs (20 g total) by mouth every 12 (twelve) hours as needed (for constipation).    LANTUS SOLOSTAR U-100 INSULIN 100 unit/mL (3 mL) InPn pen INJECT 12-15 UNITS UNDER THE SKIN at night    losartan (COZAAR) 100 MG tablet Take 1 tablet (100 mg total) by mouth once daily.    methocarbamoL (ROBAXIN) 750 MG Tab TAKE 1 TO 2 TABLETS(750 TO 1500 MG) BY MOUTH THREE TIMES DAILY AS NEEDED FOR MUSCLE SPASMS    ondansetron (ZOFRAN-ODT) 8 MG TbDL 8 mg EVERY 12 HOURS (route: oral)    oxybutynin (DITROPAN-XL) 10 MG 24 hr tablet TAKE 1 TABLET BY MOUTH EVERY DAY    oxyCODONE (ROXICODONE) 5 MG immediate release tablet Take 1 tablet (5 mg total) by mouth daily as needed (severe pain).    patiromer calcium sorbitex (VELTASSA) 16.8 gram PwPk Take 1 packet (16.8 g total) by mouth once daily. for 90 doses    pen needle, diabetic (BD ULTRA-FINE SHORT PEN NEEDLE) 31 gauge x 5/16" Ndle USE WITH LANTUS DAILY, e 11.65    silver sulfADIAZINE 1% (SILVADENE) 1 % cream Apply topically Every 3 (three) days.    sodium bicarbonate 650 MG tablet Take 1 tablet (650 mg total) by mouth once daily.    sumatriptan (IMITREX) 100 MG tablet Take 1 tablet (100 mg total) by mouth every 2 (two) hours as needed for Migraine (Limit 2 in 14 hours and 9 in one month).    SYNTHROID 50 mcg tablet TAKE 1 TABLET BY MOUTH BEFORE BREAKFAST. ADMINISTER ON AN EMPTY STOMACH AT LEAST 30 MINUTES BEFORE FOOD. IF RECEIVING TUBE FEEDS, HOLD TUBE FEEDS FOR 1 HOUR BEFORE AND AFTER LEVOTHYROXINE ADMINISTRATION.    traZODone " (DESYREL) 100 MG tablet TAKE 1 TABLET BY MOUTH NIGHTLY AS NEEDED FOR INSOMNIA.    UNABLE TO FIND medication name: Tylenol Migraine- APAP, ASA, caffeine    VELPHORO 500 mg Chew CHEW INSERT TABLET 5 TIMES DAILY WITH MEAL AND SNACK     Antibiotics (From admission, onward)      Start     Stop Route Frequency Ordered    01/01/25 2145  piperacillin-tazobactam (ZOSYN) 4.5 g in D5W 100 mL IVPB (MB+)         -- IV Every 12 hours (non-standard times) 01/01/25 0935    01/01/25 1600  DAPTOmycin (CUBICIN) 970 mg in 0.9% NaCl SolP 50 mL IVPB         -- IV Every 48 hours (non-standard times) 01/01/25 1119          Antifungals (From admission, onward)      None          Antivirals (From admission, onward)      None             Immunization History   Administered Date(s) Administered    COVID-19 Vaccine 02/25/2021, 03/25/2021    COVID-19, MRNA, LN-S, PF (MODERNA FULL 0.5 ML DOSE) 02/25/2021, 03/25/2021    COVID-19, MRNA, LN-S, PF (Pfizer) (Purple Cap) 12/24/2021    Hepatitis B 11/03/2023, 12/08/2023    Hepatitis B, Adult 11/03/2023, 12/08/2023, 07/19/2024, 08/16/2024, 09/20/2024, 12/20/2024    Influenza (FLUAD) - Quadrivalent - Adjuvanted - PF *Preferred* (65+) 01/30/2023    Influenza - Quadrivalent 10/01/2014    Influenza - Quadrivalent - PF *Preferred* (6 months and older) 09/27/2023    Influenza - Trivalent - Fluarix, Flulaval, Fluzone, Afluria - PF 09/27/2024    Influenza - Trivalent - Fluzone High Dose - PF (65 years and older) 09/27/2017, 10/31/2018, 09/25/2019    PPD Test 07/01/2021, 12/21/2021, 01/03/2022, 01/25/2022    Pneumococcal Conjugate - 13 Valent 09/27/2017    Pneumococcal Polysaccharide - 23 Valent 09/19/2016, 01/30/2023    Zoster 12/16/2013       Family History       Problem Relation (Age of Onset)    ALS Mother    Cancer Maternal Grandmother, Paternal Grandfather, Brother    Diabetes Sister, Maternal Aunt, Maternal Uncle    Kidney disease Sister, Maternal Aunt    Prostate cancer Brother    Scoliosis Brother           Social History     Socioeconomic History    Marital status:     Number of children: 0    Highest education level: Master's degree (e.g., MA, MS, Lelo, MEd, MSW, BANDAR)   Occupational History    Occupation: teacher     Comment: retired   Tobacco Use    Smoking status: Never    Smokeless tobacco: Never   Substance and Sexual Activity    Alcohol use: No     Alcohol/week: 0.0 standard drinks of alcohol    Drug use: No    Sexual activity: Not Currently     Birth control/protection: Abstinence   Social History Narrative    Single ()             Brother passed away last year.  Pt lives her her sister. (5/27)     Social Drivers of Health     Financial Resource Strain: Low Risk  (12/31/2024)    Overall Financial Resource Strain (CARDIA)     Difficulty of Paying Living Expenses: Not hard at all   Food Insecurity: No Food Insecurity (12/31/2024)    Hunger Vital Sign     Worried About Running Out of Food in the Last Year: Never true     Ran Out of Food in the Last Year: Never true   Transportation Needs: No Transportation Needs (12/31/2024)    TRANSPORTATION NEEDS     Transportation : No   Physical Activity: Patient Declined (6/13/2024)    Exercise Vital Sign     Days of Exercise per Week: Patient declined     Minutes of Exercise per Session: Patient declined   Stress: No Stress Concern Present (12/31/2024)    [employer] - Occupational Stress Questionnaire     Feeling of Stress : Not at all   Housing Stability: Low Risk  (12/31/2024)    Housing Stability Vital Sign     Unable to Pay for Housing in the Last Year: No     Homeless in the Last Year: No     Review of Systems   All other systems reviewed and are negative.    Objective:     Vital Signs (Most Recent):  Temp: 98.7 °F (37.1 °C) (01/01/25 1053)  Pulse: 79 (01/01/25 1057)  Resp: 18 (01/01/25 1059)  BP: (!) 158/71 (01/01/25 1053)  SpO2: 99 % (01/01/25 1053) Vital Signs (24h Range):  Temp:  [98.6 °F (37 °C)-99.6 °F (37.6 °C)]  98.7 °F (37.1 °C)  Pulse:  [] 79  Resp:  [17-18] 18  SpO2:  [95 %-100 %] 99 %  BP: (107-163)/(61-76) 158/71     Weight: 97.1 kg (214 lb)  Body mass index is 32.54 kg/m².    Estimated Creatinine Clearance: 14.4 mL/min (A) (based on SCr of 4.3 mg/dL (H)).     Physical Exam  Vitals and nursing note reviewed.   Constitutional:       Appearance: Normal appearance.   HENT:      Head: Normocephalic and atraumatic.   Eyes:      Extraocular Movements: Extraocular movements intact.      Pupils: Pupils are equal, round, and reactive to light.   Cardiovascular:      Rate and Rhythm: Normal rate.      Heart sounds: No murmur heard.  Pulmonary:      Breath sounds: No wheezing or rhonchi.   Abdominal:      Tenderness: There is no guarding or rebound.   Skin:     Findings: No erythema.   Neurological:      General: No focal deficit present.      Mental Status: She is alert.   Psychiatric:         Mood and Affect: Mood normal.         Behavior: Behavior normal.         Thought Content: Thought content normal.         Judgment: Judgment normal.          Significant Labs: BMP:   Recent Labs   Lab 01/01/25  0419   *   *   K 3.4*      CO2 17*   BUN 38*   CREATININE 4.3*   CALCIUM 8.0*   MG 1.6     CBC:   Recent Labs   Lab 12/31/24  0937 01/01/25  0419   WBC 20.72* 12.28   HGB 11.3* 8.7*   HCT 36.5* 27.8*    254     Microbiology Results (last 7 days)       Procedure Component Value Units Date/Time    Rapid Organism ID by PCR (from Blood culture) [[id number]]  (Abnormal) Collected: 12/31/24 0937    Order Status: Completed Updated: 01/01/25 1149     Enterococcus faecalis Detected     Enterococcus faecium Not Detected     Listeria monocytogenes Not Detected     Staphylococcus spp. Not Detected     Staphylococcus aureus Not Detected     Staphylococcus epidermidis Not Detected     Staphylococcus lugdunensis Not Detected     Streptococcus species Not Detected     Streptococcus agalactiae Not Detected      Streptococcus pneumoniae Not Detected     Streptococcus pyogenes Not Detected     Acinetobacter calcoaceticus/baumannii complex Not Detected     Bacteroides fragilis Not Detected     Enterobacterales Not Detected     Enterobacter cloacae complex Not Detected     Escherichia coli Not Detected     Klebsiella aerogenes Not Detected     Klebsiella oxytoca Not Detected     Klebsiella pneumoniae group Not Detected     Proteus Not Detected     Salmonella sp Not Detected     Serratia marcescens Not Detected     Haemophilus influenzae Not Detected     Neisseria meningtidis Not Detected     Pseudomonas aeruginosa Not Detected     Stenotrophomonas maltophilia Not Detected     Candida albicans Not Detected     Candida auris Not Detected     Candida glabrata Not Detected     Candida krusei Not Detected     Candida parapsilosis Not Detected     Candida tropicalis Not Detected     Cryptococcus neoformans/gattii Not Detected     CTX-M (ESBL ) Test Not Applicable     IMP (Carbapenem resistant) Test Not Applicable     KPC resistance gene (Carbapenem resistant) Test Not Applicable     mcr-1  Test Not Applicable     mec A/C  Test Not Applicable     mec A/C and MREJ (MRSA) gene Test Not Applicable     NDM (Carbapenem resistant) Test Not Applicable     OXA-48-like (Carbapenem resistant) Test Not Applicable     van A/B (VRE gene) Not Detected     VIM (Carbapenem resistant) Test Not Applicable    Narrative:      Aerobic and anaerobic    Blood culture x two cultures. Draw prior to antibiotics. [[id number]] Collected: 12/31/24 0937    Order Status: Completed Specimen: Blood from Peripheral, Antecubital, Right Updated: 01/01/25 1032     Blood Culture, Routine Gram stain aer bottle: Gram positive cocci in chains resembling Strep      Gram stain zelda bottle: Gram positive cocci in chains resembling Strep      Results called to and read back by: Sean Hernandez RN  01/01/2025  10:31    Narrative:      Aerobic and anaerobic    Blood culture  x two cultures. Draw prior to antibiotics. [[id number]] Collected: 12/31/24 0937    Order Status: Completed Specimen: Blood from Peripheral, Antecubital, Right Updated: 01/01/25 1032     Blood Culture, Routine Gram stain aer bottle: Gram positive cocci in chains resembling Strep      Gram stain zelda bottle: Gram positive cocci in chains resembling Strep      Results called to and read back by: Sean Hernandez RN  01/01/2025  10:31    Narrative:      Aerobic and anaerobic    Urine Culture High Risk [[id number]]     Order Status: Completed Specimen: Urine, Catheterized     Urine culture [[id number]] Collected: 12/31/24 1049    Order Status: No result Specimen: Urine Updated: 12/31/24 1132            Significant Imaging: I have reviewed all pertinent imaging results/findings within the past 24 hours.

## 2025-01-01 NOTE — ASSESSMENT & PLAN NOTE
Patient's blood pressure range in the last 24 hours was: BP  Min: 107/61  Max: 163/74.The patient's inpatient anti-hypertensive regimen is listed below:  Current Antihypertensives       Plan  - BP is controlled, no changes needed to their regimen  - As she took her morning BP meds and with sepsis, will hold on scheduled BP meds for now and restart when able

## 2025-01-01 NOTE — PROGRESS NOTES
[facility] - Internal Medicine [facility]  Progress Note    Patient Name: Cecile Bowen  MRN: [id number]  Patient Class: IP- Inpatient   Admission Date: 12/31/2024  Length of Stay: 1 days  Attending Physician: Fariha Abraham MD  Primary Care Provider: Lurdes Navarro MD        Subjective     Principal Problem:Severe sepsis        HPI:  Ms. Cecile Bowen is a 74 y.o. female with ESRD on HD, T2DM, afib on Eliquis, HTN, obesity, history of breast cancer, chronic suprapubic catheter for neurogenic bladder and chronic back pain who presented to the [facility] ED 12/31 from Vascular Surgery for evaluation of fever and AMS.  History is obtained from patient.  She reports that over the last few days, she has been having fever and lethargy.  She went to see her Vascular Surgeon for right IJ PermCath removal, as her fistula has matured.  She is currently dialyzing only Mondays and Fridays via right IJ PermCath.  However, upon arrival to clinic she was febrile to 100.9F, slow to respond and tachy to 104.  She was sent to the ER for further evaluation.  At baseline, she uses a wheelchair.  She lives with her sister.  She currently endorses back pain that is not escalated from normal.  She is full code.  She took all of her medications prior to going to clinic except her Eliquis.    Upon arrival to the ER, vitals were temp 101.7F, , /77.  Labs showed WBC 20, Hgb 11.3, Sodium 129, BUN/Cr 28/3.2, Lactic 0.8.  UA was dirty.  Suprapubic catheter was exchanged in the ED.  CXR was negative.  She was given Vanc and Zosyn was admitted to Hospital Medicine for further management.    Overview/Hospital Course:  Ms. Bowen was admitted to Hospital Medicine for management of severe sepsis 2/2 Strep bacteremia and a suprapubic UTI.  She was started on Zosyn.  After her blood cx returned positive, ID was consulted.    Interval History: No acute events overnight.  Feels much better today, doesn't feel  confused or slowed.  On Zosyn.  Her only complaint is back pain as her Norco has been held because of her acute encephalopathy.  Later in the morning, blood cx returned positive for Enterococcus.  Echo ordered.  Repeats ordered for the morning of 1/1.  ID consulted.     Review of Systems   Constitutional:  Negative for chills, fatigue and fever.   Respiratory:  Negative for cough and shortness of breath.    Cardiovascular:  Negative for chest pain, palpitations and leg swelling.   Gastrointestinal:  Negative for abdominal pain, diarrhea, nausea and vomiting.   Genitourinary:  Negative for dysuria and urgency.   Musculoskeletal:  Positive for back pain.   Neurological:  Negative for dizziness and headaches.   All other systems reviewed and are negative.    Objective:     Vital Signs (Most Recent):  Temp: 98.7 °F (37.1 °C) (01/01/25 1053)  Pulse: 79 (01/01/25 1057)  Resp: 18 (01/01/25 1059)  BP: (!) 158/71 (01/01/25 1053)  SpO2: 99 % (01/01/25 1053) Vital Signs (24h Range):  Temp:  [98.6 °F (37 °C)-99.6 °F (37.6 °C)] 98.7 °F (37.1 °C)  Pulse:  [] 79  Resp:  [17-18] 18  SpO2:  [95 %-100 %] 99 %  BP: (107-163)/(59-76) 158/71     Weight: 97.1 kg (214 lb)  Body mass index is 32.54 kg/m².    Intake/Output Summary (Last 24 hours) at 1/1/2025 1109  Last data filed at 1/1/2025 0538  Gross per 24 hour   Intake --   Output 200 ml   Net -200 ml      Physical Exam  Constitutional:       Appearance: Normal appearance. She is obese.   HENT:      Head: Normocephalic and atraumatic.   Cardiovascular:      Rate and Rhythm: Normal rate and regular rhythm.      Heart sounds: No murmur heard.  Pulmonary:      Effort: Pulmonary effort is normal. No respiratory distress.      Breath sounds: Normal breath sounds. No wheezing or rales.   Abdominal:      General: There is no distension.      Palpations: Abdomen is soft.      Tenderness: There is no abdominal tenderness.   Genitourinary:     Comments: Suprapubic  catheter  Musculoskeletal:         General: No deformity.   Skin:     General: Skin is warm and dry.      Coloration: Skin is pale.   Neurological:      General: No focal deficit present.      Mental Status: She is alert and oriented to person, place, and time. Mental status is at baseline.         MELD 3.0: 25 at 1/1/2025  4:19 AM  MELD-Na: 25 at 1/1/2025  4:19 AM  Calculated from:  Serum Creatinine: On dialysis. Using the maximum value.  Serum Sodium: 130 mmol/L at 1/1/2025  4:19 AM  Total Bilirubin: 0.3 mg/dL (Using min of 1 mg/dL) at 1/1/2025  4:19 AM  Serum Albumin: 2.2 g/dL at 1/1/2025  4:19 AM  INR(ratio): 1 at 12/31/2024  9:37 AM  Age at listing (hypothetical): 74 years  Sex: Female at 1/1/2025  4:19 AM      Significant Labs:  CBC:  Recent Labs   Lab 12/31/24  0937 01/01/25  0419   WBC 20.72* 12.28   HGB 11.3* 8.7*   HCT 36.5* 27.8*    254     CMP:  Recent Labs   Lab 12/31/24  0937 01/01/25  0419   * 130*   K 4.1 3.4*   CL 98 100   CO2 17* 17*   * 159*   BUN 28* 38*   CREATININE 3.2* 4.3*   CALCIUM 8.9 8.0*   PROT 8.0 5.9*   ALBUMIN 3.0* 2.2*   BILITOT 0.4 0.3   ALKPHOS 158* 122   AST 12 10   ALT 12 8*   ANIONGAP 14 13     PTINR:  Recent Labs   Lab 12/31/24  0937   INR 1.0           Assessment and Plan     * Severe sepsis  Severe sepsis 2/2 CAUTI  On arrival febrile to 101.7F, tachycardic 102 with WBC 21 and slow to respond (encephalopathy)  Suprapubic catheter changed in the ED  WBC down to 12, Procal 1.4  Urine cx NGTD  Blood cx 12.31 with Enterococcus  Of note, does have a tunneled line, will need to determine if it needs to be removed vs treated through.  Vascular Surgery was planning to remove the line on 12/31  Repeat blood cx 1.2  Continue Zosyn:  Chart review shows history of Pseudomonas, as well as ESBL UTIs    Toxic encephalopathy  Not confused but slow to respond 2/2 questions, able to hold a conversation and answer questions appropriately  Likely 2/2 sepsis  Treatment of  infection as above  Mentation back to baseline    Bacteremia due to Enterococcus  See severe sepsis      Catheter-associated urinary tract infection  See severe sepsis      HTN (hypertension)  Patient's blood pressure range in the last 24 hours was: BP  Min: 107/61  Max: 163/74.The patient's inpatient anti-hypertensive regimen is listed below:  Current Antihypertensives       Plan  - BP is controlled, no changes needed to their regimen  - As she took her morning BP meds and with sepsis, will hold on scheduled BP meds for now and restart when able    Positive JASON (antinuclear antibody)  History noted      Personal history of malignant neoplasm of breast  History noted  Continue Arimidex      ESRD (end stage renal disease) on dialysis  Creatine stable for now. BMP reviewed- noted Estimated Creatinine Clearance: 14.4 mL/min (A) (based on SCr of 4.3 mg/dL (H)). according to latest data. Based on current GFR, CKD stage is end stage.  Monitor UOP and serial BMP and adjust therapy as needed. Renally dose meds. Avoid nephrotoxic medications and procedures.    Nephro consult  Has been getting HD MF via right IJ PermCath  Needs f/u with Vascular Surgery for IJ removal    Pressure injury of right buttock, stage 3  Wound Care      Anemia in ESRD (end-stage renal disease)  Anemia is likely due to chronic disease due to ESRD. Most recent hemoglobin and hematocrit are listed below.  Recent Labs     12/31/24  0937 01/01/25  0419   HGB 11.3* 8.7*   HCT 36.5* 27.8*       Plan  - Monitor serial CBC: Daily  - Transfuse PRBC if patient becomes hemodynamically unstable, symptomatic or H/H drops below 7/21.  - Patient has not received any PRBC transfusions to date  - Patient's anemia is currently dropping, no source of bleeding possibly a lab error, monitor for now    Unspecified atrial fibrillation  Patient has paroxysmal (<7 days) atrial fibrillation. Patient is currently in sinus rhythm. [id number] Score: 3. The patients heart rate in  the last 24 hours is as follows:  Pulse  Min: 69  Max: 104     Antiarrhythmics       Anticoagulants  apixaban tablet 5 mg, 2 times daily, Oral    Plan  - Replete lytes with a goal of K>4, Mg >2  - Patient is anticoagulated, see medications listed above.  - Patient's afib is currently controlled  - Previously had afib, and on Eliquis once a day because of bruising?  As she is being monitored will put on appropriate BID dosing    Type 2 diabetes mellitus treated with insulin  HbA1c 6.2, sugars controlled  Home DM regimen:  Largine 15 qHS  Hold scheduled insulin for now with AMS and decreased oral intake  SSI with POCT accuchecks to achieve blood glucose of 140-180 while hospitalized  Hypoglycemia protocol  Diabetic diet        Prophylactic use of anastrozole (Arimidex)  On Arimidex for history of breast cancer      Obesity, diabetes, and hypertension syndrome  Noted    Chronic pain  2/2 lumbar spondylosis  See above      Lumbar spondylosis  Chronic issue  Continue Cymbalta  Continue Robaxin TID PRN  Continue Norco prn      Class 1 obesity due to excess calories with serious comorbidity in adult  Body mass index is 32.54 kg/m². Morbid obesity complicates all aspects of disease management from diagnostic modalities to treatment. Weight loss encouraged and health benefits explained to patient.         Major depressive disorder, recurrent, moderate  Patient has recurrent depression which is moderate and is currently controlled. Will Continue anti-depressant medications. We will not consult psychiatry at this time. Patient does not display psychosis at this time. Continue to monitor closely and adjust plan of care as needed.      Chronic and stable  Continue Cymbalta      Neurogenic bladder  Chronic issue  Has suprapubic cath  Continue Oxybutynin      Hyponatremia  Hyponatremia is likely due to volume overload from ESRD. The patient's most recent sodium results are listed below.  Recent Labs     12/31/24  4628  01/01/25  0419   * 130*       Plan  - Correct the sodium by 4-6mEq in 24 hours.   - Will treat the hyponatremia with HD  - Monitor sodium Daily.   - Patient hyponatremia is stable    Hyperlipidemia  Chronic and stable  Continue Lipitor    Hypothyroidism  Chronic and stable  Continue Synthroid  TSH 1.631        VTE Risk Mitigation (From admission, onward)           Ordered     apixaban tablet 5 mg  2 times daily         12/31/24 1251                    Discharge Planning   FLORENTINO: 1/4/2025     Code Status: Full Code   Medical Readiness for Discharge Date:                            Fariha Abraham MD  Department of Hospital Medicine   [facility] - Internal Medicine Telemetry

## 2025-01-01 NOTE — ASSESSMENT & PLAN NOTE
Severe sepsis 2/2 CAUTI and Enterococcus bacteremia  On arrival febrile to 101.7F, tachycardic 102 with WBC 21 and slow to respond (encephalopathy)  Suprapubic catheter changed in the ED  WBC down to 12, Procal 1.4  Urine cx with GNR  Blood cx 12.31 with Enterococcus  Of note, does have a tunneled line, will need to determine if it needs to be removed vs treated through.  Vascular Surgery was planning to remove the line on 12/31  Repeat blood cx 1.2  Continue Zosyn:  Chart review shows history of Pseudomonas, as well as ESBL UTIs

## 2025-01-01 NOTE — ASSESSMENT & PLAN NOTE
Anemia is likely due to chronic disease due to ESRD. Most recent hemoglobin and hematocrit are listed below.  Recent Labs     12/31/24  0937 01/01/25  0419   HGB 11.3* 8.7*   HCT 36.5* 27.8*       Plan  - Monitor serial CBC: Daily  - Transfuse PRBC if patient becomes hemodynamically unstable, symptomatic or H/H drops below 7/21.  - Patient has not received any PRBC transfusions to date  - Patient's anemia is currently dropping, no source of bleeding possibly a lab error, monitor for now

## 2025-01-01 NOTE — ASSESSMENT & PLAN NOTE
Patient has paroxysmal (<7 days) atrial fibrillation. Patient is currently in sinus rhythm. [id number] Score: 3. The patients heart rate in the last 24 hours is as follows:  Pulse  Min: 69  Max: 104     Antiarrhythmics       Anticoagulants  apixaban tablet 5 mg, 2 times daily, Oral    Plan  - Replete lytes with a goal of K>4, Mg >2  - Patient is anticoagulated, see medications listed above.  - Patient's afib is currently controlled  - Previously had afib, and on Eliquis once a day because of bruising?  As she is being monitored will put on appropriate BID dosing

## 2025-01-01 NOTE — ASSESSMENT & PLAN NOTE
Ms. Bowen is a pleasant 73 yo woman with ESRD admitted with enterococcal sepsis. Etiology is likely her urine and urine culture is pending. If not, then her Permacath would be the next best bet.    Recommendations  Continue abx - Zosyn and daptomycin for Enterococcus pending AST (plan to de-escalate once final AST has returned, if not sooner)  Repeat blood cultures  If her urine does not grow Enterococcus or if her bacteremia fails to clear, would remove her Permacath to help clear her bacteremia

## 2025-01-01 NOTE — PROGRESS NOTES
Pharmacist Renal Dose Adjustment Note    Cecile Bowen is a 74 y.o. female being treated with the medication piperacillin-tazobactam     Patient Data:    Vital Signs (Most Recent):  Temp: 98.9 °F (37.2 °C) (01/01/25 0807)  Pulse: 77 (01/01/25 0807)  Resp: 17 (01/01/25 0807)  BP: (!) 152/76 (01/01/25 0807)  SpO2: 100 % (01/01/25 0807) Vital Signs (72h Range):  Temp:  [98.6 °F (37 °C)-101.7 °F (38.7 °C)]   Pulse:  []   Resp:  [17-20]   BP: (105-176)/(52-99)   SpO2:  [95 %-100 %]      Recent Labs   Lab 12/31/24  0937 01/01/25 0419   CREATININE 3.2* 4.3*     Serum creatinine: 4.3 mg/dL (H) 01/01/25 0419  Estimated creatinine clearance: 14.4 mL/min (A)    Piperacillin-tazobactam 4.5 g IV q8h will be changed to q12h    Pharmacist's Name: Fariha Hightower  Pharmacist's Extension: [phone]

## 2025-01-01 NOTE — PROGRESS NOTES
1426 Arrived to [facility] via stretcher    1455 Maintenance HD started via [facility]. Venous access took 2 attempts to cannulate with the first removing clots

## 2025-01-01 NOTE — HPI
Ms. Bowen is a pleasant 74 y.o. woman with ESRD on HD,and chronic suprapubic catheter for neurogenic bladder who presented to the [facility] 12/31 from Vascular Surgery for evaluation of fever and AMS.  She reports that she was well until she went to see her Vascular Surgeon for right IJ PermCath removal. She is currently dialyzing only Mondays and Fridays via right IJ PermCath.  However, upon arrival to clinic she was febrile to 100.9F, slow to respond and tachy to 104.  She was sent to the ER for further evaluation.  In the ED, she was febrile. Blood and urine studies were obtained and the patient was admitted with likely sepsis. Today, her blood cultures are growing GPC in chains, likely Enterococcus by BCID. ID is consulted for bacteremia. Today, the patient is clear and feeling better. She tells me that she usually notices a change in the color of her urine when she has a UTI and that her urine has been clear. No rigors today. Her Permacath remains in place.

## 2025-01-01 NOTE — ASSESSMENT & PLAN NOTE
Severe sepsis 2/2 CAUTI  On arrival febrile to 101.7F, tachycardic 102 with WBC 21 and slow to respond (encephalopathy)  Suprapubic catheter changed in the ED  WBC down to 12, Procal 1.4  Urine cx NGTD  Blood cx 12.31 with Strep  Of note, does have a tunneled line, will need to determine if it needs to be removed vs treated through.  Vascular Surgery was planning to remove the line on 12/31  Repeat blood cx 1.2  Continue Zosyn:  Chart review shows history of Pseudomonas, as well as ESBL UTIs

## 2025-01-01 NOTE — SUBJECTIVE & OBJECTIVE
Past Medical History:   Diagnosis Date    Cervicogenic migraine     Chronic pain     CKD (chronic kidney disease) stage 4, GFR 15-29 ml/min     [facility]    CKD (chronic kidney disease) stage 4, GFR 15-29 ml/min     Diabetes mellitus     Long term use of Insulin, Diabetic Neuropathy    Dialysis patient 1/21/2022    Fibromyalgia     Hydronephrosis     Hyperlipidemia     Hypertension 12/12/2012    Hypothyroidism 12/12/2012    ARON (iron deficiency anemia)     Insomnia     Levoscoliosis     Malignant neoplasm of upper-outer quadrant of left breast in female, estrogen receptor positive     Metabolic acidosis     Mobility impaired     Nuclear sclerosis - Both Eyes 3/24/2014    VIJAYA (obstructive sleep apnea)     Osteopenia     Pulmonary nodule     Recurrent UTI     Renal manifestation of secondary diabetes mellitus     Secondary hyperparathyroidism, renal     Urinary retention        Past Surgical History:   Procedure Laterality Date    ANGIOGRAPHY OF UPPER EXTREMITY N/A 9/19/2024    Procedure: Angiogram Extremity Bilateral;  Surgeon: RODRIGO Friedman III, MD;  [facility];  Service: Vascular;  Laterality: N/A;  R femoral approach, arch & arm angiogram  168.76mgy  33.1145 gycm2  8.9min    AV FISTULA PLACEMENT Left 2/14/2024    Procedure: CREATION, AV FISTULA;  Surgeon: RODRIGO Friedman III, MD;  Location: [facility] OR 2ND FLR;  Service: Vascular;  Laterality: Left;  LUE AVF creation vs AVG insertion    BIOPSY OF URETER Left 2/18/2022    Procedure: BIOPSY, URETER;  Surgeon: Ronel Whittington MD;  Location: [facility] OR 1ST FLR;  Service: Urology;  Laterality: Left;    BREAST BIOPSY Right     benign    CHOLECYSTECTOMY      COLONOSCOPY N/A 1/13/2017    Procedure: COLONOSCOPY;  Surgeon: Morris Wiseman MD;  Location: [facility] ENDO (4TH FLR);  Service: Endoscopy;  Laterality: N/A;  Renal pt Nephrology advised to avoid phosphate preps    COLONOSCOPY N/A 12/7/2023    Procedure: COLONOSCOPY;  Surgeon: Herve Allen  MD TIM;  Location: [facility] ENDO (2ND FLR);  Service: Endoscopy;  Laterality: N/A;  HD MWF; labs prior  suprapubic catheter  pt does not ambulate-uses christina lift- will have sling with her  9/7 ref. by Anastasiia Campbell DO, PEG, instr. mailed, Eliquis hold approval pending-st  ok to hold Eliquis 2 days per Dr Navarro-GT  1/30-precall complete-MS    CYSTOSCOPY N/A 10/8/2018    Procedure: CYSTOSCOPY;  Surgeon: Ronel Whittington MD;  Location: [facility] OR 1ST FLR;  Service: Urology;  Laterality: N/A;  45 min    CYSTOSCOPY N/A 3/25/2019    Procedure: CYSTOSCOPY;  Surgeon: Ronel Whittington MD;  Location: [facility] OR 1ST FLR;  Service: Urology;  Laterality: N/A;  45 min    CYSTOSCOPY N/A 8/26/2019    Procedure: CYSTOSCOPY;  Surgeon: Ronel Whittington MD;  [facility];  Service: Urology;  Laterality: N/A;  45 min    CYSTOSCOPY N/A 7/2/2021    Procedure: CYSTOSCOPY;  Surgeon: Ronel Whittington MD;  [facility];  Service: Urology;  Laterality: N/A;    CYSTOSCOPY  12/23/2021    Procedure: CYSTOSCOPY;  Surgeon: Ronel Whittington MD;  [facility];  Service: Urology;;    CYSTOSCOPY  2/18/2022    Procedure: CYSTOSCOPY;  Surgeon: Ronel Whittington MD;  [facility];  Service: Urology;;    CYSTOSCOPY W/ URETERAL STENT PLACEMENT Left 5/15/2021    Procedure: CYSTOSCOPY, WITH URETERAL STENT INSERTION;  Surgeon: Levy Sánchez Jr., MD;  Location: [facility] OR [facility] FLR;  Service: Urology;  Laterality: Left;    CYSTOSCOPY WITH BIOPSY OF BLADDER N/A 1/27/2020    Procedure: CYSTOSCOPY, WITH BLADDER BIOPSY, WITH FULGURATION IF INDICATED;  Surgeon: Ronel Whittington MD;  Location: [facility] OR [facility] FLR;  Service: Urology;  Laterality: N/A;    DILATION AND CURETTAGE OF UTERUS      FISTULOGRAM N/A 4/29/2024    Procedure: Fistulogram;  Surgeon: RODRIGO Friedman III, MD;  Location: [facility] CATH LAB;  Service: Peripheral Vascular;  Laterality: N/A;    GALLBLADDER SURGERY  2006     HYSTERECTOMY      INJECTION FOR SENTINEL NODE IDENTIFICATION Left 8/1/2019    Procedure: INJECTION, FOR SENTINEL NODE IDENTIFICATION;  Surgeon: Samia Fulton MD;  Location: [facility] OR 2ND FLR;  Service: General;  Laterality: Left;    INJECTION OF BOTULINUM TOXIN TYPE A N/A 10/8/2018    Procedure: INJECTION, BOTULINUM TOXIN, TYPE A 300 UNITS;  Surgeon: Ronel Whittington MD;  [facility];  Service: Urology;  Laterality: N/A;    INJECTION OF BOTULINUM TOXIN TYPE A N/A 3/25/2019    Procedure: INJECTION, BOTULINUM TOXIN, TYPE A 300 UNITS;  Surgeon: Ronel Whittington MD;  [facility];  Service: Urology;  Laterality: N/A;    INJECTION OF BOTULINUM TOXIN TYPE A N/A 8/26/2019    Procedure: INJECTION, BOTULINUM TOXIN, TYPE A 300 UNITS;  Surgeon: Ronel Whittington MD;  Location: [facility] OR [address] [place] [address];  Service: Urology;  Laterality: N/A;    MASTECTOMY Left 8/1/2019    Procedure: MASTECTOMY 23 hour stay;  Surgeon: Samia Fulton MD;  Location: [facility] OR 2ND FLR;  Service: General;  Laterality: Left;    MEDIPORT REMOVAL Right 6/24/2022    Procedure: REMOVAL, CATHETER, CENTRAL VENOUS, TUNNELED, WITH PORT;  Surgeon: Isauro Anderson MD;  Location: [facility] CATH LAB;  Service: Radiology;  Laterality: Right;    OVARIAN CYST SURGERY  1985    PERCUTANEOUS TRANSLUMINAL ANGIOPLASTY OF ARTERIOVENOUS FISTULA Left 4/29/2024    Procedure: PTA, AV FISTULA;  Surgeon: RODRIGO Friedman III, MD;  Location: [facility] CATH LAB;  Service: Peripheral Vascular;  Laterality: Left;    REPLACEMENT OF STENT Left 12/23/2021    Procedure: REPLACEMENT, STENT;  Surgeon: Ronel Whittington MD;  [facility];  Service: Urology;  Laterality: Left;    REPLACEMENT OF STENT Left 2/18/2022    Procedure: REPLACEMENT, STENT;  Surgeon: Ronel Whittington MD;  Location: [facility] OR [address] [place] [address];  Service: Urology;  Laterality: Left;    RETROGRADE PYELOGRAPHY Left 2/18/2022    Procedure: PYELOGRAM, RETROGRADE;  Surgeon: Ronel FRAZIER  "MD Pk;  Location: [facility] OR [address] [place] [address];  Service: Urology;  Laterality: Left;    SENTINEL LYMPH NODE BIOPSY Left 8/1/2019    Procedure: BIOPSY, LYMPH NODE, SENTINEL;  Surgeon: Samia Fulton MD;  Location: [facility] OR 2ND [facility];  Service: General;  Laterality: Left;    spt placement      TONSILLECTOMY, ADENOIDECTOMY      TOTAL ABDOMINAL HYSTERECTOMY W/ BILATERAL SALPINGOOPHORECTOMY  1985    URETEROSCOPY Left 2/18/2022    Procedure: URETEROSCOPY;  Surgeon: Ronel Whittington MD;  Location: [facility] OR [address] [place] [address];  Service: Urology;  Laterality: Left;       Review of patient's allergies indicates:  No Known Allergies    Medications:  Medications Prior to Admission   Medication Sig    ELIQUIS 5 mg Tab TAKE 1 TABLET BY MOUTH TWICE A DAY    acetaminophen (TYLENOL) 325 MG tablet Take 2 tablets (650 mg total) by mouth every 6 (six) hours as needed for Pain.    amLODIPine (NORVASC) 10 MG tablet Take 10 mg by mouth.    anastrozole (ARIMIDEX) 1 mg Tab TAKE 1 TABLET BY MOUTH EVERY DAY    atorvastatin (LIPITOR) 80 MG tablet Take 1 tablet (80 mg total) by mouth once daily.    BD INSULIN SYRINGE ULTRA-FINE 0.5 mL 31 gauge x 5/16" Syrg USE WITH INSULIN 4 TIMES A DAY    calcium acetate,phosphat bind, (PHOSLO) 667 mg capsule Take 667 mg by mouth 3 (three) times daily with meals.    carvediloL (COREG) 12.5 MG tablet Take 1 tablet (12.5 mg total) by mouth 2 (two) times daily.    diclofenac sodium (VOLTAREN) 1 % Gel Apply 2 g topically once daily.    DULoxetine (CYMBALTA) 20 MG capsule Take 2 capsules (40 mg total) by mouth once daily.    erenumab-aooe (AIMOVIG AUTOINJECTOR) 140 mg/mL autoinjector 140 mg MONTHLY (route: subcutaneous)    ergocalciferol (ERGOCALCIFEROL) 50,000 unit Cap Take 1 capsule (50,000 Units total) by mouth every 7 days.    furosemide (LASIX) 40 MG tablet Take 1 tablet (40 mg total) by mouth once daily.    HYDROcodone-acetaminophen (NORCO)  mg per tablet Take 1 tablet by mouth every 6 (six) hours as needed for " "Pain.    ipratropium (ATROVENT) 21 mcg (0.03 %) nasal spray 2 sprays by Each Nostril route 2 (two) times daily.    lactulose (CHRONULAC) 20 gram/30 mL Soln Take 30 mLs (20 g total) by mouth every 12 (twelve) hours as needed (for constipation).    LANTUS SOLOSTAR U-100 INSULIN 100 unit/mL (3 mL) InPn pen INJECT 12-15 UNITS UNDER THE SKIN at night    losartan (COZAAR) 100 MG tablet Take 1 tablet (100 mg total) by mouth once daily.    methocarbamoL (ROBAXIN) 750 MG Tab TAKE 1 TO 2 TABLETS(750 TO 1500 MG) BY MOUTH THREE TIMES DAILY AS NEEDED FOR MUSCLE SPASMS    ondansetron (ZOFRAN-ODT) 8 MG TbDL 8 mg EVERY 12 HOURS (route: oral)    oxybutynin (DITROPAN-XL) 10 MG 24 hr tablet TAKE 1 TABLET BY MOUTH EVERY DAY    oxyCODONE (ROXICODONE) 5 MG immediate release tablet Take 1 tablet (5 mg total) by mouth daily as needed (severe pain).    patiromer calcium sorbitex (VELTASSA) 16.8 gram PwPk Take 1 packet (16.8 g total) by mouth once daily. for 90 doses    pen needle, diabetic (BD ULTRA-FINE SHORT PEN NEEDLE) 31 gauge x 5/16" Ndle USE WITH LANTUS DAILY, e 11.65    silver sulfADIAZINE 1% (SILVADENE) 1 % cream Apply topically Every 3 (three) days.    sodium bicarbonate 650 MG tablet Take 1 tablet (650 mg total) by mouth once daily.    sumatriptan (IMITREX) 100 MG tablet Take 1 tablet (100 mg total) by mouth every 2 (two) hours as needed for Migraine (Limit 2 in 14 hours and 9 in one month).    SYNTHROID 50 mcg tablet TAKE 1 TABLET BY MOUTH BEFORE BREAKFAST. ADMINISTER ON AN EMPTY STOMACH AT LEAST 30 MINUTES BEFORE FOOD. IF RECEIVING TUBE FEEDS, HOLD TUBE FEEDS FOR 1 HOUR BEFORE AND AFTER LEVOTHYROXINE ADMINISTRATION.    traZODone (DESYREL) 100 MG tablet TAKE 1 TABLET BY MOUTH NIGHTLY AS NEEDED FOR INSOMNIA.    UNABLE TO FIND medication name: Tylenol Migraine- APAP, ASA, caffeine    VELPHORO 500 mg Chew CHEW INSERT TABLET 5 TIMES DAILY WITH MEAL AND SNACK     Antibiotics (From admission, onward)      Start     Stop Route Frequency " Ordered    01/01/25 2145  piperacillin-tazobactam (ZOSYN) 4.5 g in D5W 100 mL IVPB (MB+)         -- IV Every 12 hours (non-standard times) 01/01/25 0935    01/01/25 1600  DAPTOmycin (CUBICIN) 970 mg in 0.9% NaCl SolP 50 mL IVPB         -- IV Every 48 hours (non-standard times) 01/01/25 1119          Antifungals (From admission, onward)      None          Antivirals (From admission, onward)      None             Immunization History   Administered Date(s) Administered    COVID-19 Vaccine 02/25/2021, 03/25/2021    COVID-19, MRNA, LN-S, PF (MODERNA FULL 0.5 ML DOSE) 02/25/2021, 03/25/2021    COVID-19, MRNA, LN-S, PF (Pfizer) (Purple Cap) 12/24/2021    Hepatitis B 11/03/2023, 12/08/2023    Hepatitis B, Adult 11/03/2023, 12/08/2023, 07/19/2024, 08/16/2024, 09/20/2024, 12/20/2024    Influenza (FLUAD) - Quadrivalent - Adjuvanted - PF *Preferred* (65+) 01/30/2023    Influenza - Quadrivalent 10/01/2014    Influenza - Quadrivalent - PF *Preferred* (6 months and older) 09/27/2023    Influenza - Trivalent - Fluarix, Flulaval, Fluzone, Afluria - PF 09/27/2024    Influenza - Trivalent - Fluzone High Dose - PF (65 years and older) 09/27/2017, 10/31/2018, 09/25/2019    PPD Test 07/01/2021, 12/21/2021, 01/03/2022, 01/25/2022    Pneumococcal Conjugate - 13 Valent 09/27/2017    Pneumococcal Polysaccharide - 23 Valent 09/19/2016, 01/30/2023    Zoster 12/16/2013       Family History       Problem Relation (Age of Onset)    ALS Mother    Cancer Maternal Grandmother, Paternal Grandfather, Brother    Diabetes Sister, Maternal Aunt, Maternal Uncle    Kidney disease Sister, Maternal Aunt    Prostate cancer Brother    Scoliosis Brother          Social History     Socioeconomic History    Marital status:     Number of children: 0    Highest education level: Master's degree (e.g., MA, MS, Lelo, MEd, MSW, BANDAR)   Occupational History    Occupation: teacher     Comment: retired   Tobacco Use    Smoking status: Never    Smokeless tobacco:  Never   Substance and Sexual Activity    Alcohol use: No     Alcohol/week: 0.0 standard drinks of alcohol    Drug use: No    Sexual activity: Not Currently     Birth control/protection: Abstinence   Social History Narrative    Single ()             Brother passed away last year.  Pt lives her her sister. (5/27)     Social Drivers of Health     Financial Resource Strain: Low Risk  (12/31/2024)    Overall Financial Resource Strain (CARDIA)     Difficulty of Paying Living Expenses: Not hard at all   Food Insecurity: No Food Insecurity (12/31/2024)    Hunger Vital Sign     Worried About Running Out of Food in the Last Year: Never true     Ran Out of Food in the Last Year: Never true   Transportation Needs: No Transportation Needs (12/31/2024)    TRANSPORTATION NEEDS     Transportation : No   Physical Activity: Patient Declined (6/13/2024)    Exercise Vital Sign     Days of Exercise per Week: Patient declined     Minutes of Exercise per Session: Patient declined   Stress: No Stress Concern Present (12/31/2024)    [employer] [place] [employer] - Occupational Stress Questionnaire     Feeling of Stress : Not at all   Housing Stability: Low Risk  (12/31/2024)    Housing Stability Vital Sign     Unable to Pay for Housing in the Last Year: No     Homeless in the Last Year: No     Review of Systems   All other systems reviewed and are negative.    Objective:     Vital Signs (Most Recent):  Temp: 98.7 °F (37.1 °C) (01/01/25 1053)  Pulse: 79 (01/01/25 1057)  Resp: 18 (01/01/25 1059)  BP: (!) 158/71 (01/01/25 1053)  SpO2: 99 % (01/01/25 1053) Vital Signs (24h Range):  Temp:  [98.6 °F (37 °C)-99.6 °F (37.6 °C)] 98.7 °F (37.1 °C)  Pulse:  [] 79  Resp:  [17-18] 18  SpO2:  [95 %-100 %] 99 %  BP: (107-163)/(61-76) 158/71     Weight: 97.1 kg (214 lb)  Body mass index is 32.54 kg/m².    Estimated Creatinine Clearance: 14.4 mL/min (A) (based on SCr of 4.3 mg/dL (H)).     Physical Exam  Vitals and nursing note  reviewed.   Constitutional:       Appearance: Normal appearance.   HENT:      Head: Normocephalic and atraumatic.   Eyes:      Extraocular Movements: Extraocular movements intact.      Pupils: Pupils are equal, round, and reactive to light.   Cardiovascular:      Rate and Rhythm: Normal rate.      Heart sounds: No murmur heard.  Pulmonary:      Breath sounds: No wheezing or rhonchi.   Abdominal:      Tenderness: There is no guarding or rebound.   Skin:     Findings: No erythema.   Neurological:      General: No focal deficit present.      Mental Status: She is alert.   Psychiatric:         Mood and Affect: Mood normal.         Behavior: Behavior normal.         Thought Content: Thought content normal.         Judgment: Judgment normal.          Significant Labs: BMP:   Recent Labs   Lab 01/01/25  0419   *   *   K 3.4*      CO2 17*   BUN 38*   CREATININE 4.3*   CALCIUM 8.0*   MG 1.6     CBC:   Recent Labs   Lab 12/31/24  0937 01/01/25  0419   WBC 20.72* 12.28   HGB 11.3* 8.7*   HCT 36.5* 27.8*    254     Microbiology Results (last 7 days)       Procedure Component Value Units Date/Time    Rapid Organism ID by PCR (from Blood culture) [[id number]]  (Abnormal) Collected: 12/31/24 0937    Order Status: Completed Updated: 01/01/25 1149     Enterococcus faecalis Detected     Enterococcus faecium Not Detected     Listeria monocytogenes Not Detected     Staphylococcus spp. Not Detected     Staphylococcus aureus Not Detected     Staphylococcus epidermidis Not Detected     Staphylococcus lugdunensis Not Detected     Streptococcus species Not Detected     Streptococcus agalactiae Not Detected     Streptococcus pneumoniae Not Detected     Streptococcus pyogenes Not Detected     Acinetobacter calcoaceticus/baumannii complex Not Detected     Bacteroides fragilis Not Detected     Enterobacterales Not Detected     Enterobacter cloacae complex Not Detected     Escherichia coli Not Detected     Klebsiella  aerogenes Not Detected     Klebsiella oxytoca Not Detected     Klebsiella pneumoniae group Not Detected     Proteus Not Detected     Salmonella sp Not Detected     Serratia marcescens Not Detected     Haemophilus influenzae Not Detected     Neisseria meningtidis Not Detected     Pseudomonas aeruginosa Not Detected     Stenotrophomonas maltophilia Not Detected     Candida albicans Not Detected     Candida auris Not Detected     Candida glabrata Not Detected     Candida krusei Not Detected     Candida parapsilosis Not Detected     Candida tropicalis Not Detected     Cryptococcus neoformans/gattii Not Detected     CTX-M (ESBL ) Test Not Applicable     IMP (Carbapenem resistant) Test Not Applicable     KPC resistance gene (Carbapenem resistant) Test Not Applicable     mcr-1  Test Not Applicable     mec A/C  Test Not Applicable     mec A/C and MREJ (MRSA) gene Test Not Applicable     NDM (Carbapenem resistant) Test Not Applicable     OXA-48-like (Carbapenem resistant) Test Not Applicable     van A/B (VRE gene) Not Detected     VIM (Carbapenem resistant) Test Not Applicable    Narrative:      Aerobic and anaerobic    Blood culture x two cultures. Draw prior to antibiotics. [[id number]] Collected: 12/31/24 0937    Order Status: Completed Specimen: Blood from Peripheral, Antecubital, Right Updated: 01/01/25 1032     Blood Culture, Routine Gram stain aer bottle: Gram positive cocci in chains resembling Strep      Gram stain zelda bottle: Gram positive cocci in chains resembling Strep      Results called to and read back by: Sean Hernandez RN  01/01/2025  10:31    Narrative:      Aerobic and anaerobic    Blood culture x two cultures. Draw prior to antibiotics. [[id number]] Collected: 12/31/24 0937    Order Status: Completed Specimen: Blood from Peripheral, Antecubital, Right Updated: 01/01/25 1032     Blood Culture, Routine Gram stain aer bottle: Gram positive cocci in chains resembling Strep      Gram stain zelda bottle:  Gram positive cocci in chains resembling Strep      Results called to and read back by: Sean Hernandez RN  01/01/2025  10:31    Narrative:      Aerobic and anaerobic    Urine Culture High Risk [[id number]]     Order Status: Completed Specimen: Urine, Catheterized     Urine culture [[id number]] Collected: 12/31/24 1049    Order Status: No result Specimen: Urine Updated: 12/31/24 1132            Significant Imaging: I have reviewed all pertinent imaging results/findings within the past 24 hours.

## 2025-01-01 NOTE — CONSULTS
GPC bacteremia  Follow up identification  Recommend addition of daptomycin 10mg/kg for enterococcal coverage while awaiting organism ID   Obtain TTE  Repeat blood cx tomorrow AM to assess for clearance  Full consult tomorrow

## 2025-01-01 NOTE — ASSESSMENT & PLAN NOTE
Not confused but slow to respond 2/2 questions, able to hold a conversation and answer questions appropriately  Likely 2/2 sepsis  Treatment of infection as above  Mentation back to baseline

## 2025-01-01 NOTE — SUBJECTIVE & OBJECTIVE
Interval History: No acute events overnight.  Feels much better today, doesn't feel confused or slowed.  On Zosyn.  Her only complaint is back pain as her Norco has been held because of her acute encephalopathy.  Later in the morning, blood cx returned positive for Strep.  Echo ordered.  Repeats ordered for the morning of 1/1.  ID consulted.     Review of Systems   Constitutional:  Negative for chills, fatigue and fever.   Respiratory:  Negative for cough and shortness of breath.    Cardiovascular:  Negative for chest pain, palpitations and leg swelling.   Gastrointestinal:  Negative for abdominal pain, diarrhea, nausea and vomiting.   Genitourinary:  Negative for dysuria and urgency.   Musculoskeletal:  Positive for back pain.   Neurological:  Negative for dizziness and headaches.   All other systems reviewed and are negative.    Objective:     Vital Signs (Most Recent):  Temp: 98.7 °F (37.1 °C) (01/01/25 1053)  Pulse: 79 (01/01/25 1057)  Resp: 18 (01/01/25 1059)  BP: (!) 158/71 (01/01/25 1053)  SpO2: 99 % (01/01/25 1053) Vital Signs (24h Range):  Temp:  [98.6 °F (37 °C)-99.6 °F (37.6 °C)] 98.7 °F (37.1 °C)  Pulse:  [] 79  Resp:  [17-18] 18  SpO2:  [95 %-100 %] 99 %  BP: (107-163)/(59-76) 158/71     Weight: 97.1 kg (214 lb)  Body mass index is 32.54 kg/m².    Intake/Output Summary (Last 24 hours) at 1/1/2025 1109  Last data filed at 1/1/2025 0538  Gross per 24 hour   Intake --   Output 200 ml   Net -200 ml      Physical Exam  Constitutional:       Appearance: Normal appearance. She is obese.   HENT:      Head: Normocephalic and atraumatic.   Cardiovascular:      Rate and Rhythm: Normal rate and regular rhythm.      Heart sounds: No murmur heard.  Pulmonary:      Effort: Pulmonary effort is normal. No respiratory distress.      Breath sounds: Normal breath sounds. No wheezing or rales.   Abdominal:      General: There is no distension.      Palpations: Abdomen is soft.      Tenderness: There is no abdominal  tenderness.   Genitourinary:     Comments: Suprapubic catheter  Musculoskeletal:         General: No deformity.   Skin:     General: Skin is warm and dry.      Coloration: Skin is pale.   Neurological:      General: No focal deficit present.      Mental Status: She is alert and oriented to person, place, and time. Mental status is at baseline.         MELD 3.0: 25 at 1/1/2025  4:19 AM  MELD-Na: 25 at 1/1/2025  4:19 AM  Calculated from:  Serum Creatinine: On dialysis. Using the maximum value.  Serum Sodium: 130 mmol/L at 1/1/2025  4:19 AM  Total Bilirubin: 0.3 mg/dL (Using min of 1 mg/dL) at 1/1/2025  4:19 AM  Serum Albumin: 2.2 g/dL at 1/1/2025  4:19 AM  INR(ratio): 1 at 12/31/2024  9:37 AM  Age at listing (hypothetical): 74 years  Sex: Female at 1/1/2025  4:19 AM      Significant Labs:  CBC:  Recent Labs   Lab 12/31/24  0937 01/01/25  0419   WBC 20.72* 12.28   HGB 11.3* 8.7*   HCT 36.5* 27.8*    254     CMP:  Recent Labs   Lab 12/31/24  0937 01/01/25  0419   * 130*   K 4.1 3.4*   CL 98 100   CO2 17* 17*   * 159*   BUN 28* 38*   CREATININE 3.2* 4.3*   CALCIUM 8.9 8.0*   PROT 8.0 5.9*   ALBUMIN 3.0* 2.2*   BILITOT 0.4 0.3   ALKPHOS 158* 122   AST 12 10   ALT 12 8*   ANIONGAP 14 13     PTINR:  Recent Labs   Lab 12/31/24  0937   INR 1.0

## 2025-01-02 LAB
ALBUMIN SERPL BCP-MCNC: 2.3 G/DL (ref 3.5–5.2)
ALP SERPL-CCNC: 92 U/L (ref 40–150)
ALT SERPL W/O P-5'-P-CCNC: 10 U/L (ref 10–44)
ANION GAP SERPL CALC-SCNC: 9 MMOL/L (ref 8–16)
ASCENDING AORTA: 3.78 CM
AST SERPL-CCNC: 13 U/L (ref 10–40)
AV AREA BY CONTINUOUS VTI: 2.1 CM2
AV INDEX (PROSTH): 0.58
AV LVOT MEAN GRADIENT: 4 MMHG
AV LVOT PEAK GRADIENT: 7 MMHG
AV MEAN GRADIENT: 9.6 MMHG
AV PEAK GRADIENT: 16 MMHG
AV VALVE AREA BY VELOCITY RATIO: 2 CM²
AV VALVE AREA: 1.8 CM²
AV VELOCITY RATIO: 0.65
BASOPHILS # BLD AUTO: 0.04 K/UL (ref 0–0.2)
BASOPHILS NFR BLD: 0.6 % (ref 0–1.9)
BILIRUB SERPL-MCNC: 0.2 MG/DL (ref 0.1–1)
BSA FOR ECHO PROCEDURE: 2.16 M2
BUN SERPL-MCNC: 21 MG/DL (ref 8–23)
CALCIUM SERPL-MCNC: 7.8 MG/DL (ref 8.7–10.5)
CHLORIDE SERPL-SCNC: 98 MMOL/L (ref 95–110)
CK SERPL-CCNC: 62 U/L (ref 20–180)
CO2 SERPL-SCNC: 23 MMOL/L (ref 23–29)
CREAT SERPL-MCNC: 2.5 MG/DL (ref 0.5–1.4)
CV ECHO LV RWT: 0.33 CM
DIFFERENTIAL METHOD BLD: ABNORMAL
DOP CALC AO PEAK VEL: 2 M/S
DOP CALC AO VTI: 50.1 CM
DOP CALC LVOT AREA: 3.1 CM2
DOP CALC LVOT DIAMETER: 2 CM
DOP CALC LVOT PEAK VEL: 1.3 M/S
DOP CALC LVOT STROKE VOLUME: 90.7 CM3
DOP CALCLVOT PEAK VEL VTI: 28.9 CM
E WAVE DECELERATION TIME: 207.44 MS
E/A RATIO: 0.95
E/E' RATIO: 15.5 M/S
ECHO EF ESTIMATED: 55 %
ECHO LV POSTERIOR WALL: 0.8 CM (ref 0.6–1.1)
EOSINOPHIL # BLD AUTO: 0.2 K/UL (ref 0–0.5)
EOSINOPHIL NFR BLD: 3.4 % (ref 0–8)
ERYTHROCYTE [DISTWIDTH] IN BLOOD BY AUTOMATED COUNT: 13.1 % (ref 11.5–14.5)
EST. GFR  (NO RACE VARIABLE): 19.9 ML/MIN/1.73 M^2
FRACTIONAL SHORTENING: 28.6 % (ref 28–44)
GLUCOSE SERPL-MCNC: 152 MG/DL (ref 70–110)
HCT VFR BLD AUTO: 26.2 % (ref 37–48.5)
HGB BLD-MCNC: 8.5 G/DL (ref 12–16)
IMM GRANULOCYTES # BLD AUTO: 0.06 K/UL (ref 0–0.04)
IMM GRANULOCYTES NFR BLD AUTO: 0.9 % (ref 0–0.5)
INTERVENTRICULAR SEPTUM: 1 CM (ref 0.6–1.1)
LA MAJOR: 5.6 CM
LA MINOR: 5.82 CM
LA WIDTH: 4.52 CM
LEFT ATRIUM SIZE: 4.36 CM
LEFT ATRIUM VOLUME INDEX MOD: 42.2 ML/M2
LEFT ATRIUM VOLUME INDEX: 45.5 ML/M2
LEFT ATRIUM VOLUME MOD: 88.61 ML
LEFT ATRIUM VOLUME: 95.61 CM3
LEFT INTERNAL DIMENSION IN SYSTOLE: 3.5 CM (ref 2.1–4)
LEFT VENTRICLE DIASTOLIC VOLUME INDEX: 54.61 ML/M2
LEFT VENTRICLE DIASTOLIC VOLUME: 114.69 ML
LEFT VENTRICLE MASS INDEX: 72.8 G/M2
LEFT VENTRICLE SYSTOLIC VOLUME INDEX: 24.5 ML/M2
LEFT VENTRICLE SYSTOLIC VOLUME: 51.52 ML
LEFT VENTRICULAR INTERNAL DIMENSION IN DIASTOLE: 4.9 CM (ref 3.5–6)
LEFT VENTRICULAR MASS: 153 G
LV LATERAL E/E' RATIO: 13.78
LV SEPTAL E/E' RATIO: 17.71
LYMPHOCYTES # BLD AUTO: 1.3 K/UL (ref 1–4.8)
LYMPHOCYTES NFR BLD: 19.3 % (ref 18–48)
MAGNESIUM SERPL-MCNC: 1.6 MG/DL (ref 1.6–2.6)
MCH RBC QN AUTO: 29.9 PG (ref 27–31)
MCHC RBC AUTO-ENTMCNC: 32.4 G/DL (ref 32–36)
MCV RBC AUTO: 92 FL (ref 82–98)
MONOCYTES # BLD AUTO: 0.8 K/UL (ref 0.3–1)
MONOCYTES NFR BLD: 11.1 % (ref 4–15)
MV A" WAVE DURATION": 133.21 MS
MV PEAK A VEL: 1.3 M/S
MV PEAK E VEL: 1.24 M/S
NEUTROPHILS # BLD AUTO: 4.4 K/UL (ref 1.8–7.7)
NEUTROPHILS NFR BLD: 64.7 % (ref 38–73)
NRBC BLD-RTO: 0 /100 WBC
OHS CV RV/LV RATIO: 0.76 CM
PHOSPHATE SERPL-MCNC: 3.1 MG/DL (ref 2.7–4.5)
PISA TR MAX VEL: 2.56 M/S
PLATELET # BLD AUTO: 245 K/UL (ref 150–450)
PMV BLD AUTO: 8.8 FL (ref 9.2–12.9)
POCT GLUCOSE: 156 MG/DL (ref 70–110)
POCT GLUCOSE: 164 MG/DL (ref 70–110)
POCT GLUCOSE: 177 MG/DL (ref 70–110)
POTASSIUM SERPL-SCNC: 3.5 MMOL/L (ref 3.5–5.1)
PROT SERPL-MCNC: 5.9 G/DL (ref 6–8.4)
PULM VEIN A" WAVE DURATION": 133.21 MS
PULM VEIN S/D RATIO: 1.37
PULMONIC VEIN PEAK A VELOCITY: 0.3 M/S
PV PEAK D VEL: 0.38 M/S
PV PEAK S VEL: 0.52 M/S
RA MAJOR: 3.7 CM
RA PRESSURE ESTIMATED: 0 MMHG
RA WIDTH: 3.6 CM
RBC # BLD AUTO: 2.84 M/UL (ref 4–5.4)
RIGHT VENTRICLE DIASTOLIC BASEL DIMENSION: 3.7 CM
RV TB RVSP: 3 MMHG
SINUS: 3.21 CM
SODIUM SERPL-SCNC: 130 MMOL/L (ref 136–145)
STJ: 2.52 CM
TDI LATERAL: 0.09 M/S
TDI SEPTAL: 0.07 M/S
TDI: 0.08 M/S
TR MAX PG: 26 MMHG
TRICUSPID ANNULAR PLANE SYSTOLIC EXCURSION: 1.6 CM
TV PEAK GRADIENT: 26 MMHG
TV REST PULMONARY ARTERY PRESSURE: 26 MMHG
WBC # BLD AUTO: 6.84 K/UL (ref 3.9–12.7)
Z-SCORE OF LEFT VENTRICULAR DIMENSION IN END DIASTOLE: -2.87
Z-SCORE OF LEFT VENTRICULAR DIMENSION IN END SYSTOLE: -1.03

## 2025-01-02 PROCEDURE — 94761 N-INVAS EAR/PLS OXIMETRY MLT: CPT | Mod: HCNC

## 2025-01-02 PROCEDURE — 25000003 PHARM REV CODE 250: Mod: HCNC | Performed by: HOSPITALIST

## 2025-01-02 PROCEDURE — 85025 COMPLETE CBC W/AUTO DIFF WBC: CPT | Mod: HCNC | Performed by: HOSPITALIST

## 2025-01-02 PROCEDURE — 99233 SBSQ HOSP IP/OBS HIGH 50: CPT | Mod: HCNC,,, | Performed by: INTERNAL MEDICINE

## 2025-01-02 PROCEDURE — 36415 COLL VENOUS BLD VENIPUNCTURE: CPT | Mod: HCNC | Performed by: HOSPITALIST

## 2025-01-02 PROCEDURE — 80053 COMPREHEN METABOLIC PANEL: CPT | Mod: HCNC | Performed by: HOSPITALIST

## 2025-01-02 PROCEDURE — 83735 ASSAY OF MAGNESIUM: CPT | Mod: HCNC | Performed by: HOSPITALIST

## 2025-01-02 PROCEDURE — 63600175 PHARM REV CODE 636 W HCPCS: Mod: HCNC | Performed by: HOSPITALIST

## 2025-01-02 PROCEDURE — 82550 ASSAY OF CK (CPK): CPT | Mod: HCNC | Performed by: HOSPITALIST

## 2025-01-02 PROCEDURE — 21400001 HC TELEMETRY ROOM: Mod: HCNC

## 2025-01-02 PROCEDURE — 93990 DOPPLER FLOW TESTING: CPT | Mod: HCNC | Performed by: SURGERY

## 2025-01-02 PROCEDURE — 99232 SBSQ HOSP IP/OBS MODERATE 35: CPT | Mod: HCNC,,, | Performed by: NURSE PRACTITIONER

## 2025-01-02 PROCEDURE — 0T2BX0Z CHANGE DRAINAGE DEVICE IN BLADDER, EXTERNAL APPROACH: ICD-10-PCS | Performed by: EMERGENCY MEDICINE

## 2025-01-02 PROCEDURE — 99222 1ST HOSP IP/OBS MODERATE 55: CPT | Mod: HCNC,,, | Performed by: SURGERY

## 2025-01-02 PROCEDURE — 87040 BLOOD CULTURE FOR BACTERIA: CPT | Mod: 59,HCNC | Performed by: HOSPITALIST

## 2025-01-02 PROCEDURE — 84100 ASSAY OF PHOSPHORUS: CPT | Mod: HCNC | Performed by: HOSPITALIST

## 2025-01-02 RX ORDER — SODIUM CHLORIDE 9 MG/ML
INJECTION, SOLUTION INTRAVENOUS ONCE
Status: CANCELLED | OUTPATIENT
Start: 2025-01-03

## 2025-01-02 RX ADMIN — PIPERACILLIN SODIUM AND TAZOBACTAM SODIUM 4.5 G: 4; .5 INJECTION, POWDER, LYOPHILIZED, FOR SOLUTION INTRAVENOUS at 09:01

## 2025-01-02 RX ADMIN — ANASTROZOLE 1 MG: 1 TABLET, COATED ORAL at 08:01

## 2025-01-02 RX ADMIN — APIXABAN 5 MG: 5 TABLET, FILM COATED ORAL at 08:01

## 2025-01-02 RX ADMIN — TRAZODONE HYDROCHLORIDE 100 MG: 100 TABLET ORAL at 08:01

## 2025-01-02 RX ADMIN — LEVOTHYROXINE SODIUM 50 MCG: 0.05 TABLET ORAL at 05:01

## 2025-01-02 RX ADMIN — IPRATROPIUM BROMIDE 2 SPRAY: 42 SPRAY, METERED NASAL at 09:01

## 2025-01-02 RX ADMIN — HYDROCODONE BITARTRATE AND ACETAMINOPHEN 1 TABLET: 10; 325 TABLET ORAL at 04:01

## 2025-01-02 RX ADMIN — SODIUM BICARBONATE 650 MG TABLET 650 MG: at 08:01

## 2025-01-02 RX ADMIN — HYDROCODONE BITARTRATE AND ACETAMINOPHEN 1 TABLET: 10; 325 TABLET ORAL at 11:01

## 2025-01-02 RX ADMIN — OXYBUTYNIN CHLORIDE 10 MG: 10 TABLET, EXTENDED RELEASE ORAL at 08:01

## 2025-01-02 RX ADMIN — DULOXETINE HYDROCHLORIDE 40 MG: 20 CAPSULE, DELAYED RELEASE ORAL at 08:01

## 2025-01-02 RX ADMIN — ATORVASTATIN CALCIUM 80 MG: 40 TABLET, FILM COATED ORAL at 08:01

## 2025-01-02 RX ADMIN — HYDROCODONE BITARTRATE AND ACETAMINOPHEN 1 TABLET: 10; 325 TABLET ORAL at 08:01

## 2025-01-02 NOTE — ASSESSMENT & PLAN NOTE
74 year old female hx of ESRD on HD MWF presenting for AMS and fever, concern for sepsis.     Nephrology History  iHD Schedule: MF, now MWF per patient  Unit/MD: [facility]/Dr. Hare   Duration: 4 hours   EDW: 98 kg.   Access: JENNY AVF (has TDC scheduled to be removed)  Residual Renal Function: +++    Plan/Recommendations  -No need for emergent RRT, electrolytes and volume status stable. Plan to resume HD MWF tomorrow.   -Will continue iHD thrice weekly unless emergent indication arises  -Agree with removal of TDC if concern for bacteremia, ID following. Repeat blood cultures pending.    -Vascular consulted due to issues w cannulation of LUE AVF yesterday; US pending.   -Hgb goal 10-11, hgb 8.5. Will plan for EPO with HD.   -Strict I&Os  -phos 3.1, no binders.   -Renal diet if not NPO

## 2025-01-02 NOTE — PROGRESS NOTES
01/01/25 1845   Post-Hemodialysis Assessment   Blood Volume Processed (Liters) 60.6 L   Duration of Treatment 210 minutes   Net Fluid Removal 2000     HD tx completed without issue, blood returned and PC heparin locked without issue. Report given to primary RN. Waiting on transport back to unit.

## 2025-01-02 NOTE — HPI
Cecile Bowen is a 74 y.o. female with ESRD on HD via left left brachiocephalic AV fistula (2/14/2024) and RIJ permacath, T2DM, afib on Eliquis, HTN, obesity, history of breast cancer, chronic suprapubic catheter for neurogenic bladder and chronic back pain. She was admitted to hospital medicine for management of severe sepsis 2/2 Enterococcus bacteremia and a suprapubic UTI.  Her HD while inpatient was complicated by clots in her LUE AVG. She says she uses the AVG and right IJ permacath for HD. Vascular surgery was consult for complications with her MERLINE AVG

## 2025-01-02 NOTE — PROGRESS NOTES
[facility] - Internal Medicine Telemetry  Nephrology  Progress Note    Patient Name: Cecile Bowen  MRN: [id number]  Admission Date: 12/31/2024  Hospital Length of Stay: 2 days  Attending Provider: Fariha Abraham MD   Primary Care Physician: Lurdes Navarro MD  Principal Problem:Severe sepsis    Subjective:     HPI: Ms. Cecile Bowen is a 74 y.o. female with ESRD on HD, T2DM, afib on Eliquis, HTN, obesity, history of breast cancer, chronic suprapubic catheter for neurogenic bladder and chronic back pain who presented to the [facility] ED 12/31 from Vascular Surgery for evaluation of fever and AMS.  History is obtained from patient.  She reports that over the last few days, she has been having fever and lethargy.  She went to see her Vascular Surgeon for right IJ PermCath removal, as her fistula has matured.  She is currently dialyzing only Mondays and Fridays via right IJ PermCath.  However, upon arrival to clinic she was febrile to 100.9F, slow to respond and tachy to 104.  She was sent to the ER for further evaluation.  At baseline, she uses a wheelchair.  She lives with her sister.  She currently endorses back pain that is not escalated from normal.  She is full code.  She took all of her medications prior to going to clinic except her Eliquis.     Upon arrival to the ER, vitals were temp 101.7F, , /77.  Labs showed WBC 20, Hgb 11.3, Sodium 129, BUN/Cr 28/3.2, Lactic 0.8.  UA was dirty.  Suprapubic catheter was exchanged in the ED.  CXR was negative.  She was given Vanc and Zosyn was admitted to Hospital Medicine for further management. Nephrology consulted for HD management.     Interval History:   HD completed yesterday with no issues. Net UF 2 L. Electrolytes non emergent. Difficulty with cannulation yesterday, Vascular consulted. US of HD access pending.     ID following; not recommending removal of TDC at this time. Remain on IV abx. Repeat blood cultures in process.     Review of  patient's allergies indicates:  No Known Allergies  Current Facility-Administered Medications   Medication Frequency    acetaminophen tablet 650 mg Q4H PRN    anastrozole tablet 1 mg Daily    apixaban tablet 5 mg BID    atorvastatin tablet 80 mg Daily    DAPTOmycin (CUBICIN) 970 mg in 0.9% NaCl SolP 50 mL IVPB Q48H    dextrose 50% injection 12.5 g PRN    dextrose 50% injection 25 g PRN    DULoxetine DR capsule 40 mg Daily    glucagon (human recombinant) injection 1 mg PRN    glucose chewable tablet 16 g PRN    glucose chewable tablet 24 g PRN    heparin (porcine) injection 1,000 Units PRN    HYDROcodone-acetaminophen  mg per tablet 1 tablet Q4H PRN    ipratropium 42 mcg (0.06 %) nasal spray 2 spray BID    levothyroxine tablet 50 mcg Before breakfast    methocarbamoL tablet 750 mg TID PRN    naloxone 0.4 mg/mL injection 0.02 mg PRN    ondansetron injection 8 mg Q6H PRN    oxybutynin 24 hr tablet 10 mg Daily    piperacillin-tazobactam (ZOSYN) 4.5 g in D5W 100 mL IVPB (MB+) Q12H    polyethylene glycol packet 17 g Daily PRN    sodium bicarbonate tablet 650 mg Daily    sodium chloride 0.9% flush 5 mL PRN    traZODone tablet 100 mg Nightly PRN     Facility-Administered Medications Ordered in Other Encounters   Medication Frequency    epoetin chloe injection 2,000 Units 1 time in Clinic/HOD    heparin (porcine) injection 2,000 Units Once    heparin (porcine) injection 2,000 Units Once    heparin (porcine) injection 4,000 Units 1 time in Clinic/HOD    iron sucrose injection 100 mg 1 time in Clinic/HOD       Objective:     Vital Signs (Most Recent):  Temp: 99 °F (37.2 °C) (01/02/25 0727)  Pulse: 78 (01/02/25 0727)  Resp: 18 (01/02/25 0727)  BP: (!) 147/73 (01/02/25 0727)  SpO2: 98 % (01/02/25 0727) Vital Signs (24h Range):  Temp:  [98.5 °F (36.9 °C)-99 °F (37.2 °C)] 99 °F (37.2 °C)  Pulse:  [73-84] 78  Resp:  [17-18] 18  SpO2:  [96 %-99 %] 98 %  BP: ()/(50-74) 147/73     Weight: 97.1 kg (214 lb) (01/01/25  0538)  Body mass index is 32.54 kg/m².  Body surface area is 2.16 meters squared.    I/O last 3 completed shifts:  In: 500 [Other:500]  Out: 3225 [Urine:725; Other:2500]     Physical Exam  Vitals and nursing note reviewed.   Constitutional:       Appearance: She is ill-appearing.   HENT:      Head: Normocephalic.   Eyes:      Conjunctiva/sclera: Conjunctivae normal.   Cardiovascular:      Rate and Rhythm: Normal rate.      Arteriovenous access: Left arteriovenous access is present.     Comments: LUE AVF + thrill  Pulmonary:      Effort: Pulmonary effort is normal. No respiratory distress.   Chest:      Comments: TDC R chest wall with no surrounding erythema  Abdominal:      Palpations: Abdomen is soft.      Tenderness: There is no abdominal tenderness.   Musculoskeletal:      Right lower leg: Edema present.      Left lower leg: Edema present.   Skin:     General: Skin is warm and dry.   Neurological:      Mental Status: She is alert.   Psychiatric:         Mood and Affect: Mood normal.          Significant Labs:  CBC:   Recent Labs   Lab 01/02/25  0526   WBC 6.84   RBC 2.84*   HGB 8.5*   HCT 26.2*      MCV 92   MCH 29.9   MCHC 32.4     CMP:   Recent Labs   Lab 01/02/25  0526   *   CALCIUM 7.8*   ALBUMIN 2.3*   PROT 5.9*   *   K 3.5   CO2 23   CL 98   BUN 21   CREATININE 2.5*   ALKPHOS 92   ALT 10   AST 13   BILITOT 0.2     All labs within the past 24 hours have been reviewed.     Assessment/Plan:     Renal/  ESRD (end stage renal disease) on dialysis  74 year old female hx of ESRD on HD MWF presenting for AMS and fever, concern for sepsis.     Nephrology History  iHD Schedule: MF, now MWF per patient  Unit/MD: [facility]/Dr. Hare   Duration: 4 hours   EDW: 98 kg.   Access: JENNY AVF (has TDC scheduled to be removed)  Residual Renal Function: +++    Plan/Recommendations  -No need for emergent RRT, electrolytes and volume status stable. Plan to resume HD MWF tomorrow.   -Will continue iHD thrice weekly  unless emergent indication arises  -Agree with removal of TDC if concern for bacteremia, ID following. Repeat blood cultures pending.    -Vascular consulted due to issues w cannulation of LUE AVF yesterday; US pending.   -Hgb goal 10-11, hgb 8.5. Will plan for EPO with HD.   -Strict I&Os  -phos 3.1, no binders.   -Renal diet if not NPO     ID  * Severe sepsis  -management per primary    Bacteremia due to Enterococcus  - ID following        Thank you for your consult. I will follow-up with patient. Please contact us if you have any additional questions.    Mayra Porter DNP, FNP-C  Nephrology  [facility] - Internal Medicine Telemetry

## 2025-01-02 NOTE — ASSESSMENT & PLAN NOTE
Hyponatremia is likely due to volume overload from ESRD. The patient's most recent sodium results are listed below.  Recent Labs     12/31/24  0937 01/01/25  0419 01/02/25  0526   * 130* 130*       Plan  - Correct the sodium by 4-6mEq in 24 hours.   - Will treat the hyponatremia with HD  - Monitor sodium Daily.   - Patient hyponatremia is stable

## 2025-01-02 NOTE — ASSESSMENT & PLAN NOTE
73yo female with ESRD on HD 2x/week on Monday and Friday. She has been using the LUE AVG and right IJ permacath for HD but occasionally the LUE graft with clot and not work for HD. She takes eliquis currently.     HD U/S without hemodynamically significant stenosis, small overlying hematoma from apparent infiltration at dialysis.     May cannulate fistula.   Can eat today.   Appears bacteremia is 2/2 to UTI and not TDC.

## 2025-01-02 NOTE — ASSESSMENT & PLAN NOTE
Patient has paroxysmal (<7 days) atrial fibrillation. Patient is currently in sinus rhythm. [id number] Score: 3. The patients heart rate in the last 24 hours is as follows:  Pulse  Min: 73  Max: 84     Antiarrhythmics       Anticoagulants  apixaban tablet 5 mg, 2 times daily, Oral  heparin (porcine) injection 1,000 Units, As needed (PRN), Intra-Catheter    Plan  - Replete lytes with a goal of K>4, Mg >2  - Patient is anticoagulated, see medications listed above.  - Patient's afib is currently controlled  - Previously had afib, and on Eliquis once a day because of bruising?  As she is being monitored will put on appropriate BID dosing

## 2025-01-02 NOTE — PLAN OF CARE
Problem: Adult Inpatient Plan of Care  Goal: Plan of Care Review  Outcome: Progressing  Goal: Patient-Specific Goal (Individualized)  Outcome: Progressing  Goal: Absence of Hospital-Acquired Illness or Injury  Outcome: Progressing  Goal: Optimal Comfort and Wellbeing  Outcome: Progressing  Goal: Readiness for Transition of Care  Outcome: Progressing     Problem: Skin Injury Risk Increased  Goal: Skin Health and Integrity  Outcome: Progressing     Problem: Hemodialysis  Goal: Safe, Effective Therapy Delivery  Outcome: Progressing  Goal: Effective Tissue Perfusion  Outcome: Progressing  Goal: Absence of Infection Signs and Symptoms  Outcome: Progressing     Problem: Diabetes Comorbidity  Goal: Blood Glucose Level Within Targeted Range  Outcome: Progressing     Problem: Sepsis/Septic Shock  Goal: Optimal Coping  Outcome: Progressing  Goal: Absence of Bleeding  Outcome: Progressing  Goal: Blood Glucose Level Within Targeted Range  Outcome: Progressing  Goal: Absence of Infection Signs and Symptoms  Outcome: Progressing  Goal: Optimal Nutrition Intake  Outcome: Progressing     Problem: Acute Kidney Injury/Impairment  Goal: Fluid and Electrolyte Balance  Outcome: Progressing  Goal: Improved Oral Intake  Outcome: Progressing  Goal: Effective Renal Function  Outcome: Progressing     Problem: Infection  Goal: Absence of Infection Signs and Symptoms  Outcome: Progressing     Problem: Wound  Goal: Optimal Coping  Outcome: Progressing  Goal: Optimal Functional Ability  Outcome: Progressing  Goal: Absence of Infection Signs and Symptoms  Outcome: Progressing  Goal: Improved Oral Intake  Outcome: Progressing  Goal: Optimal Pain Control and Function  Outcome: Progressing  Goal: Skin Health and Integrity  Outcome: Progressing  Goal: Optimal Wound Healing  Outcome: Progressing

## 2025-01-02 NOTE — ASSESSMENT & PLAN NOTE
Patient's blood pressure range in the last 24 hours was: BP  Min: 91/50  Max: 158/67.The patient's inpatient anti-hypertensive regimen is listed below:  Current Antihypertensives       Plan  - BP is controlled, no changes needed to their regimen  - As she took her morning BP meds and with sepsis, will hold on scheduled BP meds for now and restart when able

## 2025-01-02 NOTE — CONSULTS
[facility] - Internal Medicine Telemetry  Wound Care    Patient Name:  Cecile Bowen   MRN:  [id number]  Date: 1/2/2025  Diagnosis: Severe sepsis    History:     Past Medical History:   Diagnosis Date    Cervicogenic migraine     Chronic pain     CKD (chronic kidney disease) stage 4, GFR 15-29 ml/min     [facility]    CKD (chronic kidney disease) stage 4, GFR 15-29 ml/min     Diabetes mellitus     Long term use of Insulin, Diabetic Neuropathy    Dialysis patient 1/21/2022    Fibromyalgia     Hydronephrosis     Hyperlipidemia     Hypertension 12/12/2012    Hypothyroidism 12/12/2012    ARON (iron deficiency anemia)     Insomnia     Levoscoliosis     Malignant neoplasm of upper-outer quadrant of left breast in female, estrogen receptor positive     Metabolic acidosis     Mobility impaired     Nuclear sclerosis - Both Eyes 3/24/2014    VIJAYA (obstructive sleep apnea)     Osteopenia     Pulmonary nodule     Recurrent UTI     Renal manifestation of secondary diabetes mellitus     Secondary hyperparathyroidism, renal     Urinary retention        Social History     Socioeconomic History    Marital status:     Number of children: 0    Highest education level: Master's degree (e.g., MA, MS, Lelo, MEd, MSW, BANDAR)   Occupational History    Occupation: teacher     Comment: retired   Tobacco Use    Smoking status: Never    Smokeless tobacco: Never   Substance and Sexual Activity    Alcohol use: No     Alcohol/week: 0.0 standard drinks of alcohol    Drug use: No    Sexual activity: Not Currently     Birth control/protection: Abstinence   Social History Narrative    Single ()             Brother passed away last year.  Pt lives her her sister. (5/27)     Social Drivers of Health     Financial Resource Strain: Low Risk  (12/31/2024)    Overall Financial Resource Strain (CARDIA)     Difficulty of Paying Living Expenses: Not hard at all   Food Insecurity: No Food Insecurity (12/31/2024)    Hunger Vital Sign     Worried About  Running Out of Food in the Last Year: Never true     Ran Out of Food in the Last Year: Never true   Transportation Needs: No Transportation Needs (12/31/2024)    TRANSPORTATION NEEDS     Transportation : No   Physical Activity: Patient Declined (6/13/2024)    Exercise Vital Sign     Days of Exercise per Week: Patient declined     Minutes of Exercise per Session: Patient declined   Stress: No Stress Concern Present (12/31/2024)    [place] [employer] - Occupational Stress Questionnaire     Feeling of Stress : Not at all   Housing Stability: Low Risk  (12/31/2024)    Housing Stability Vital Sign     Unable to Pay for Housing in the Last Year: No     Homeless in the Last Year: No       Precautions:     Allergies as of 12/31/2024    (No Known Allergies)       [facility] Assessment Details/Treatment     Patient seen for inpatient wound care consult. Patient sitting up in bed and agreeable to assessment at this time. Upon assessment, blanchable erythema noted to sacrum and bilateral buttocks indicative of moisture. No open wounds or active drainage noted.        Reviewed chart for this encounter.  See flowsheet for findings.      Recommendations: Cleanse sacrum and buttocks with sterile normal saline and pat dry. Apply triad BID and PRN if soiled for moisture barrier. Immerse surface ordered for pressure redistribution and microclimate. Nursing to maintain pressure injury prevention measures. Will follow up 1/9/2025 or sooner if needed.        Discussed POC with patient and primary nurse.  See EMR for orders and patient education.    Bedside nursing to continue care and monitoring.  Bedside to maintain pressure injury prevention interventions.        01/02/25 1000   WOCN Assessment   WOCN Total Time (mins) 30   Visit Date 01/02/25   Visit Time 1000   Consult Type New   WOCN Speciality Wound   Intervention assessed;changed;applied;chart review;coordination of care;orders   Teaching on-going        Wound  12/31/24 1201 Moisture associated dermatitis Buttocks   Date First Assessed/Time First Assessed: 12/31/24 1201   Present on Original Admission: Yes  Primary Wound Type: Moisture associated dermatitis  Location: Buttocks  Is this injury device related?: No   Wound Image    Dressing Appearance Open to air   Drainage Amount None   Drainage Characteristics/Odor No odor   Appearance Pink;Red   Care Cleansed with:;Sterile normal saline;Applied:;Skin Barrier  (Triad)       Gavin CHOIN, RN, [facility]  01/02/2025

## 2025-01-02 NOTE — SUBJECTIVE & OBJECTIVE
Interval History: Feels okay. Getting TTE. No fever or chills. Confusion resolved.    Review of Systems   All other systems reviewed and are negative.    Objective:     Vital Signs (Most Recent):  Temp: 99 °F (37.2 °C) (01/02/25 0727)  Pulse: 78 (01/02/25 0727)  Resp: 18 (01/02/25 1121)  BP: (!) 147/73 (01/02/25 0727)  SpO2: 98 % (01/02/25 0727) Vital Signs (24h Range):  Temp:  [98.5 °F (36.9 °C)-99 °F (37.2 °C)] 99 °F (37.2 °C)  Pulse:  [73-84] 78  Resp:  [18] 18  SpO2:  [96 %-98 %] 98 %  BP: ()/(50-74) 147/73     Weight: 97.1 kg (214 lb)  Body mass index is 32.54 kg/m².    Estimated Creatinine Clearance: 24.8 mL/min (A) (based on SCr of 2.5 mg/dL (H)).     Physical Exam  Vitals and nursing note reviewed.   Constitutional:       Appearance: Normal appearance.   HENT:      Head: Normocephalic and atraumatic.   Neurological:      General: No focal deficit present.      Mental Status: She is alert and oriented to person, place, and time.   Psychiatric:         Mood and Affect: Mood normal.         Behavior: Behavior normal.         Thought Content: Thought content normal.         Judgment: Judgment normal.          Significant Labs: BMP:   Recent Labs   Lab 01/02/25  0526   *   *   K 3.5   CL 98   CO2 23   BUN 21   CREATININE 2.5*   CALCIUM 7.8*   MG 1.6     CBC:   Recent Labs   Lab 01/01/25  0419 01/02/25  0526   WBC 12.28 6.84   HGB 8.7* 8.5*   HCT 27.8* 26.2*    245     Microbiology Results (last 7 days)       Procedure Component Value Units Date/Time    Blood culture x two cultures. Draw prior to antibiotics. [[id number]]  (Abnormal) Collected: 12/31/24 0941    Order Status: Completed Specimen: Blood from Peripheral, Antecubital, Right Updated: 01/02/25 0826     Blood Culture, Routine Gram stain aer bottle: Gram positive cocci in chains resembling Strep      Gram stain zelda bottle: Gram positive cocci in chains resembling Strep      Results called to and read back by: Sean Hernandez RN   01/01/2025  10:31      ENTEROCOCCUS SPECIES  Identification pending  For susceptibility see order #[id number]      Narrative:      Aerobic and anaerobic    Blood culture x two cultures. Draw prior to antibiotics. [[id number]]  (Abnormal) Collected: 12/31/24 0937    Order Status: Completed Specimen: Blood from Peripheral, Antecubital, Right Updated: 01/02/25 0824     Blood Culture, Routine Gram stain aer bottle: Gram positive cocci in chains resembling Strep      Gram stain zelda bottle: Gram positive cocci in chains resembling Strep      Results called to and read back by: Sean Hernandez RN  01/01/2025  10:31      ENTEROCOCCUS SPECIES  Identification and susceptibility pending      Narrative:      Aerobic and anaerobic    Blood culture [[id number]] Collected: 01/02/25 0529    Order Status: Sent Specimen: Blood from Peripheral, Hand, Right Updated: 01/02/25 0556    Blood culture [[id number]] Collected: 01/02/25 0526    Order Status: Sent Specimen: Blood from Peripheral, Antecubital, Right Updated: 01/02/25 0556    Urine culture [[id number]]  (Abnormal) Collected: 12/31/24 1049    Order Status: Completed Specimen: Urine Updated: 01/01/25 1751     Urine Culture, Routine GRAM NEGATIVE FERNANDEZ  50,000 - 99,999 cfu/ml  Identification and susceptibility pending      Narrative:      Specimen Source->Urine    Rapid Organism ID by PCR (from Blood culture) [[id number]]  (Abnormal) Collected: 12/31/24 0937    Order Status: Completed Updated: 01/01/25 1149     Enterococcus faecalis Detected     Enterococcus faecium Not Detected     Listeria monocytogenes Not Detected     Staphylococcus spp. Not Detected     Staphylococcus aureus Not Detected     Staphylococcus epidermidis Not Detected     Staphylococcus lugdunensis Not Detected     Streptococcus species Not Detected     Streptococcus agalactiae Not Detected     Streptococcus pneumoniae Not Detected     Streptococcus pyogenes Not Detected     Acinetobacter calcoaceticus/baumannii  complex Not Detected     Bacteroides fragilis Not Detected     Enterobacterales Not Detected     Enterobacter cloacae complex Not Detected     Escherichia coli Not Detected     Klebsiella aerogenes Not Detected     Klebsiella oxytoca Not Detected     Klebsiella pneumoniae group Not Detected     Proteus Not Detected     Salmonella sp Not Detected     Serratia marcescens Not Detected     Haemophilus influenzae Not Detected     Neisseria meningtidis Not Detected     Pseudomonas aeruginosa Not Detected     Stenotrophomonas maltophilia Not Detected     Candida albicans Not Detected     Candida auris Not Detected     Candida glabrata Not Detected     Candida krusei Not Detected     Candida parapsilosis Not Detected     Candida tropicalis Not Detected     Cryptococcus neoformans/gattii Not Detected     CTX-M (ESBL ) Test Not Applicable     IMP (Carbapenem resistant) Test Not Applicable     KPC resistance gene (Carbapenem resistant) Test Not Applicable     mcr-1  Test Not Applicable     mec A/C  Test Not Applicable     mec A/C and MREJ (MRSA) gene Test Not Applicable     NDM (Carbapenem resistant) Test Not Applicable     OXA-48-like (Carbapenem resistant) Test Not Applicable     van A/B (VRE gene) Not Detected     VIM (Carbapenem resistant) Test Not Applicable    Narrative:      Aerobic and anaerobic    Urine Culture High Risk [[id number]]     Order Status: Completed Specimen: Urine, Catheterized             Significant Imaging: I have reviewed all pertinent imaging results/findings within the past 24 hours.

## 2025-01-02 NOTE — PROGRESS NOTES
[facility] - Internal Medicine [facility] Medicine  Progress Note    Patient Name: Cecile Bowen  MRN: [id number]  Patient Class: IP- Inpatient   Admission Date: 12/31/2024  Length of Stay: 2 days  Attending Physician: Fariha Abraham MD  Primary Care Provider: Lurdes Navarro MD        Subjective     Principal Problem:Severe sepsis        HPI:  Ms. Cecile Bowen is a 74 y.o. female with ESRD on HD, T2DM, afib on Eliquis, HTN, obesity, history of breast cancer, chronic suprapubic catheter for neurogenic bladder and chronic back pain who presented to the [facility] 12/31 from Vascular Surgery for evaluation of fever and AMS.  History is obtained from patient.  She reports that over the last few days, she has been having fever and lethargy.  She went to see her Vascular Surgeon for right IJ PermCath removal, as her fistula has matured.  She is currently dialyzing only Mondays and Fridays via right IJ PermCath.  However, upon arrival to clinic she was febrile to 100.9F, slow to respond and tachy to 104.  She was sent to the ER for further evaluation.  At baseline, she uses a wheelchair.  She lives with her sister.  She currently endorses back pain that is not escalated from normal.  She is full code.  She took all of her medications prior to going to clinic except her Eliquis.    Upon arrival to the ER, vitals were temp 101.7F, , /77.  Labs showed WBC 20, Hgb 11.3, Sodium 129, BUN/Cr 28/3.2, Lactic 0.8.  UA was dirty.  Suprapubic catheter was exchanged in the ED.  CXR was negative.  She was given Vanc and Zosyn was admitted to Hospital Medicine for further management.    Overview/Hospital Course:  Ms. Bowen was admitted to Hospital Medicine for management of severe sepsis 2/2 Enterococcus bacteremia and a suprapubic UTI.  She was started on Zosyn.  After her blood cx returned positive, ID was consulted and she was started on Dapto for Enterococcus.    Interval History: No acute events  overnight.  Waiting to get US of her HD access given difficulty with cannulation and removal of a clot. Vascular Surgery consulted.  Blood cx with Enterococcus.  Remains on Dapto. Urine cx with GNR, on Zosyn.  Discussed need to remain in house while waiting on blood cx to clear.  Hopeful to not need permacath removal, as she reports her fistula access is inconsistent.    Review of Systems   Constitutional:  Negative for chills, fatigue and fever.   Respiratory:  Negative for cough and shortness of breath.    Cardiovascular:  Negative for chest pain, palpitations and leg swelling.   Gastrointestinal:  Negative for abdominal pain, diarrhea, nausea and vomiting.   Genitourinary:  Negative for dysuria and urgency.   Musculoskeletal:  Positive for back pain.   Neurological:  Negative for dizziness and headaches.   All other systems reviewed and are negative.    Objective:     Vital Signs (Most Recent):  Temp: 99 °F (37.2 °C) (01/02/25 0727)  Pulse: 78 (01/02/25 0727)  Resp: 18 (01/02/25 0727)  BP: (!) 147/73 (01/02/25 0727)  SpO2: 98 % (01/02/25 0727) Vital Signs (24h Range):  Temp:  [98.5 °F (36.9 °C)-99 °F (37.2 °C)] 99 °F (37.2 °C)  Pulse:  [73-84] 78  Resp:  [17-18] 18  SpO2:  [96 %-99 %] 98 %  BP: ()/(50-74) 147/73     Weight: 97.1 kg (214 lb)  Body mass index is 32.54 kg/m².    Intake/Output Summary (Last 24 hours) at 1/2/2025 0942  Last data filed at 1/2/2025 0639  Gross per 24 hour   Intake 500 ml   Output 3025 ml   Net -2525 ml      Physical Exam  Constitutional:       Appearance: Normal appearance. She is obese.   HENT:      Head: Normocephalic and atraumatic.   Cardiovascular:      Rate and Rhythm: Normal rate and regular rhythm.      Heart sounds: No murmur heard.  Pulmonary:      Effort: Pulmonary effort is normal. No respiratory distress.      Breath sounds: Normal breath sounds. No wheezing or rales.   Abdominal:      General: There is no distension.      Palpations: Abdomen is soft.      Tenderness:  "There is no abdominal tenderness.   Genitourinary:     Comments: Suprapubic catheter  Musculoskeletal:         General: No deformity.   Skin:     General: Skin is warm and dry.      Coloration: Skin is pale.   Neurological:      General: No focal deficit present.      Mental Status: She is alert and oriented to person, place, and time. Mental status is at baseline.         MELD 3.0: 25 at 1/2/2025  5:26 AM  MELD-Na: 25 at 1/2/2025  5:26 AM  Calculated from:  Serum Creatinine: On dialysis. Using the maximum value.  Serum Sodium: 130 mmol/L at 1/2/2025  5:26 AM  Total Bilirubin: 0.2 mg/dL (Using min of 1 mg/dL) at 1/2/2025  5:26 AM  Serum Albumin: 2.3 g/dL at 1/2/2025  5:26 AM  INR(ratio): 1 at 12/31/2024  9:37 AM  Age at listing (hypothetical): 74 years  Sex: Female at 1/2/2025  5:26 AM      Significant Labs:  CBC:  Recent Labs   Lab 01/01/25  0419 01/02/25  0526   WBC 12.28 6.84   HGB 8.7* 8.5*   HCT 27.8* 26.2*    245     CMP:  Recent Labs   Lab 01/01/25  0419 01/02/25  0526   * 130*   K 3.4* 3.5    98   CO2 17* 23   * 152*   BUN 38* 21   CREATININE 4.3* 2.5*   CALCIUM 8.0* 7.8*   PROT 5.9* 5.9*   ALBUMIN 2.2* 2.3*   BILITOT 0.3 0.2   ALKPHOS 122 92   AST 10 13   ALT 8* 10   ANIONGAP 13 9     PTINR:  No results for input(s): "INR" in the last 48 hours.          Assessment and Plan     * Severe sepsis  Severe sepsis 2/2 CAUTI and Enterococcus bacteremia  On arrival febrile to 101.7F, tachycardic 102 with WBC 21 and slow to respond (encephalopathy)  Suprapubic catheter changed in the ED  WBC down to 12, Procal 1.4  Urine cx with GNR  Blood cx 12.31 with Enterococcus  Of note, does have a tunneled line, will need to determine if it needs to be removed vs treated through.  Vascular Surgery was planning to remove the line on 12/31  Repeat blood cx 1.2  Continue Zosyn:  Chart review shows history of Pseudomonas, as well as ESBL UTIs    Toxic encephalopathy  Not confused but slow to respond 2/2 " questions, able to hold a conversation and answer questions appropriately  Likely 2/2 sepsis  Treatment of infection as above  Mentation back to baseline    Bacteremia due to Enterococcus  See severe sepsis      Catheter-associated urinary tract infection  See severe sepsis      HTN (hypertension)  Patient's blood pressure range in the last 24 hours was: BP  Min: 91/50  Max: 158/67.The patient's inpatient anti-hypertensive regimen is listed below:  Current Antihypertensives       Plan  - BP is controlled, no changes needed to their regimen  - As she took her morning BP meds and with sepsis, will hold on scheduled BP meds for now and restart when able    Positive JASON (antinuclear antibody)  History noted      Personal history of malignant neoplasm of breast  History noted  Continue Arimidex      ESRD (end stage renal disease) on dialysis  Creatine stable for now. BMP reviewed- noted Estimated Creatinine Clearance: 24.8 mL/min (A) (based on SCr of 2.5 mg/dL (H)). according to latest data. Based on current GFR, CKD stage is end stage.  Monitor UOP and serial BMP and adjust therapy as needed. Renally dose meds. Avoid nephrotoxic medications and procedures.    Nephro consult  Has been getting HD MF via right IJ PermCath  Needs f/u with Vascular Surgery for IJ removal  Vascular Surgery consulted for problem cannulating HD access    Pressure injury of right buttock, stage 3  Wound Care      Anemia in ESRD (end-stage renal disease)  Anemia is likely due to chronic disease due to ESRD. Most recent hemoglobin and hematocrit are listed below.  Recent Labs     12/31/24  0937 01/01/25  0419 01/02/25  0526   HGB 11.3* 8.7* 8.5*   HCT 36.5* 27.8* 26.2*       Plan  - Monitor serial CBC: Daily  - Transfuse PRBC if patient becomes hemodynamically unstable, symptomatic or H/H drops below 7/21.  - Patient has not received any PRBC transfusions to date  - Patient's anemia is currently dropping, no source of bleeding possibly a lab  error, monitor for now    Unspecified atrial fibrillation  Patient has paroxysmal (<7 days) atrial fibrillation. Patient is currently in sinus rhythm. [id number] Score: 3. The patients heart rate in the last 24 hours is as follows:  Pulse  Min: 73  Max: 84     Antiarrhythmics       Anticoagulants  apixaban tablet 5 mg, 2 times daily, Oral  heparin (porcine) injection 1,000 Units, As needed (PRN), Intra-Catheter    Plan  - Replete lytes with a goal of K>4, Mg >2  - Patient is anticoagulated, see medications listed above.  - Patient's afib is currently controlled  - Previously had afib, and on Eliquis once a day because of bruising?  As she is being monitored will put on appropriate BID dosing    Type 2 diabetes mellitus treated with insulin  HbA1c 6.2, sugars controlled  Home DM regimen:  Largine 15 qHS  Hold scheduled insulin for now with AMS and decreased oral intake  SSI with POCT accuchecks to achieve blood glucose of 140-180 while hospitalized  Hypoglycemia protocol  Diabetic diet        Prophylactic use of anastrozole (Arimidex)  On Arimidex for history of breast cancer      Obesity, diabetes, and hypertension syndrome  Noted    Chronic pain  2/2 lumbar spondylosis  See above      Lumbar spondylosis  Chronic issue  Continue Cymbalta  Continue Robaxin TID PRN  Continue Norco prn      Class 1 obesity due to excess calories with serious comorbidity in adult  Body mass index is 32.54 kg/m². Morbid obesity complicates all aspects of disease management from diagnostic modalities to treatment. Weight loss encouraged and health benefits explained to patient.         Major depressive disorder, recurrent, moderate  Patient has recurrent depression which is moderate and is currently controlled. Will Continue anti-depressant medications. We will not consult psychiatry at this time. Patient does not display psychosis at this time. Continue to monitor closely and adjust plan of care as needed.      Chronic and  stable  Continue Cymbalta      Neurogenic bladder  Chronic issue  Has suprapubic cath  Continue Oxybutynin      Hyponatremia  Hyponatremia is likely due to volume overload from ESRD. The patient's most recent sodium results are listed below.  Recent Labs     12/31/24  0937 01/01/25  0419 01/02/25  0526   * 130* 130*       Plan  - Correct the sodium by 4-6mEq in 24 hours.   - Will treat the hyponatremia with HD  - Monitor sodium Daily.   - Patient hyponatremia is stable    Hyperlipidemia  Chronic and stable  Continue Lipitor    Hypothyroidism  Chronic and stable  Continue Synthroid  TSH 1.631        VTE Risk Mitigation (From admission, onward)           Ordered     heparin (porcine) injection 1,000 Units  As needed (PRN)         01/01/25 1610     apixaban tablet 5 mg  2 times daily         12/31/24 1251                    Discharge Planning   FLORENTINO: 1/4/2025     Code Status: Full Code   Medical Readiness for Discharge Date:                            Fariha Abraham MD  Department of Hospital Medicine   [facility] - Internal Medicine Telemetry

## 2025-01-02 NOTE — ASSESSMENT & PLAN NOTE
Anemia is likely due to chronic disease due to ESRD. Most recent hemoglobin and hematocrit are listed below.  Recent Labs     12/31/24  0937 01/01/25  0419 01/02/25  0526   HGB 11.3* 8.7* 8.5*   HCT 36.5* 27.8* 26.2*       Plan  - Monitor serial CBC: Daily  - Transfuse PRBC if patient becomes hemodynamically unstable, symptomatic or H/H drops below 7/21.  - Patient has not received any PRBC transfusions to date  - Patient's anemia is currently dropping, no source of bleeding possibly a lab error, monitor for now

## 2025-01-02 NOTE — CONSULTS
[facility] - Internal Medicine Telemetry  Vascular Surgery  Consult Note    Consults  Subjective:     Chief Complaint/Reason for Admission: Sepsis    History of Present Illness: Cecile Bowen is a 74 y.o. female with ESRD on HD via left left brachiocephalic AV fistula (2/14/2024) and RIJ permacath, T2DM, afib on Eliquis, HTN, obesity, history of breast cancer, chronic suprapubic catheter for neurogenic bladder and chronic back pain. She was admitted to hospital medicine for management of severe sepsis 2/2 Enterococcus bacteremia and a suprapubic UTI.  Her HD while inpatient was complicated by clots in her LUE AVG. She says she uses the AVG and right IJ permacath for HD. Vascular surgery was consult for complications with her MERLINE AVG    Medications:  Continuous Infusions:  Scheduled Meds:   anastrozole  1 mg Oral Daily    apixaban  5 mg Oral BID    atorvastatin  80 mg Oral Daily    DAPTOmycin (CUBICIN) IV (PEDS and ADULTS)  10 mg/kg Intravenous Q48H    DULoxetine  40 mg Oral Daily    ipratropium  2 spray Each Nostril BID    levothyroxine  50 mcg Oral Before breakfast    oxybutynin  10 mg Oral Daily    piperacillin-tazobactam (Zosyn) IV (PEDS and ADULTS) (extended infusion is not appropriate)  4.5 g Intravenous Q12H    sodium bicarbonate  650 mg Oral Daily     PRN Meds:  Current Facility-Administered Medications:     acetaminophen, 650 mg, Oral, Q4H PRN    dextrose 50%, 12.5 g, Intravenous, PRN    dextrose 50%, 25 g, Intravenous, PRN    glucagon (human recombinant), 1 mg, Intramuscular, PRN    glucose, 16 g, Oral, PRN    glucose, 24 g, Oral, PRN    heparin (porcine), 1,000 Units, Intra-Catheter, PRN    HYDROcodone-acetaminophen, 1 tablet, Oral, Q4H PRN    methocarbamoL, 750 mg, Oral, TID PRN    naloxone, 0.02 mg, Intravenous, PRN    ondansetron, 8 mg, Intravenous, Q6H PRN    polyethylene glycol, 17 g, Oral, Daily PRN    sodium chloride 0.9%, 5 mL, Intravenous, PRN    traZODone, 100 mg, Oral, Nightly PRN     Objective:      Vital Signs (Most Recent):  Temp: 99 °F (37.2 °C) (01/02/25 0727)  Pulse: 78 (01/02/25 0727)  Resp: 18 (01/02/25 0727)  BP: (!) 147/73 (01/02/25 0727)  SpO2: 98 % (01/02/25 0727) Vital Signs (24h Range):  Temp:  [98.5 °F (36.9 °C)-99 °F (37.2 °C)] 99 °F (37.2 °C)  Pulse:  [70-84] 78  Resp:  [17-18] 18  SpO2:  [96 %-99 %] 98 %  BP: ()/(50-74) 147/73          Physical Exam    Physical Exam  Constitutional:       General: She is not in acute distress.     Appearance: Normal appearance. She is not ill-appearing.   HENT:      Head: Normocephalic and atraumatic.      Nose: Nose normal.      Mouth/Throat:      Mouth: Mucous membranes are moist.   Eyes:      Extraocular Movements: Extraocular movements intact.      Conjunctiva/sclera: Conjunctivae normal.   Cardiovascular:      Rate and Rhythm: Normal rate and regular rhythm.   Pulmonary:      Effort: Pulmonary effort is normal. No respiratory distress.   Abdominal:      General: There is no distension.      Palpations: Abdomen is soft.   Musculoskeletal:      Cervical back: Normal range of motion.   Skin:     General: Skin is warm and dry.   Neurological:      General: No focal deficit present. L hand 5/5  strength     Mental Status: She is alert and oriented to person, place, and time. Mental status is at baseline.   Vascular:     LUE brachiocephalic AV fistula with thrill but no significant pulsality     Significant Labs:  All pertinent labs from the last 24 hours have been reviewed.    Significant Diagnostics:  U/S: No results found in the last 24 hours.    I have reviewed all pertinent imaging results/findings within the past 24 hours.  Assessment/Plan:     ESRD (end stage renal disease) on dialysis  75yo female with ESRD on HD 2x/week on Monday and Friday. She has been using the LUE AVG and right IJ permacath for HD but occasionally the LUE graft with clot and not work for HD. She takes eliquis currently.     HD U/S without hemodynamically significant  stenosis, small overlying hematoma from apparent infiltration at dialysis.     May cannulate fistula.   Can eat today.   Appears bacteremia is 2/2 to UTI and not TDC.         Thank you for your consult.     Ten Cerda MD  Vascular Surgery Fellow  [facility] - Internal Medicine Telemetry

## 2025-01-02 NOTE — SUBJECTIVE & OBJECTIVE
Interval History:   HD completed yesterday with no issues. Net UF 2 L. Electrolytes non emergent. Difficulty with cannulation yesterday, Vascular consulted. US of HD access pending.     ID following; not recommending removal of TDC at this time. Remain on IV abx. Repeat blood cultures in process.     Review of patient's allergies indicates:  No Known Allergies  Current Facility-Administered Medications   Medication Frequency    acetaminophen tablet 650 mg Q4H PRN    anastrozole tablet 1 mg Daily    apixaban tablet 5 mg BID    atorvastatin tablet 80 mg Daily    DAPTOmycin (CUBICIN) 970 mg in 0.9% NaCl SolP 50 mL IVPB Q48H    dextrose 50% injection 12.5 g PRN    dextrose 50% injection 25 g PRN    DULoxetine DR capsule 40 mg Daily    glucagon (human recombinant) injection 1 mg PRN    glucose chewable tablet 16 g PRN    glucose chewable tablet 24 g PRN    heparin (porcine) injection 1,000 Units PRN    HYDROcodone-acetaminophen  mg per tablet 1 tablet Q4H PRN    ipratropium 42 mcg (0.06 %) nasal spray 2 spray BID    levothyroxine tablet 50 mcg Before breakfast    methocarbamoL tablet 750 mg TID PRN    naloxone 0.4 mg/mL injection 0.02 mg PRN    ondansetron injection 8 mg Q6H PRN    oxybutynin 24 hr tablet 10 mg Daily    piperacillin-tazobactam (ZOSYN) 4.5 g in D5W 100 mL IVPB (MB+) Q12H    polyethylene glycol packet 17 g Daily PRN    sodium bicarbonate tablet 650 mg Daily    sodium chloride 0.9% flush 5 mL PRN    traZODone tablet 100 mg Nightly PRN     Facility-Administered Medications Ordered in Other Encounters   Medication Frequency    epoetin chloe injection 2,000 Units 1 time in Clinic/HOD    heparin (porcine) injection 2,000 Units Once    heparin (porcine) injection 2,000 Units Once    heparin (porcine) injection 4,000 Units 1 time in Clinic/HOD    iron sucrose injection 100 mg 1 time in Clinic/HOD       Objective:     Vital Signs (Most Recent):  Temp: 99 °F (37.2 °C) (01/02/25 0727)  Pulse: 78 (01/02/25  0727)  Resp: 18 (01/02/25 0727)  BP: (!) 147/73 (01/02/25 0727)  SpO2: 98 % (01/02/25 0727) Vital Signs (24h Range):  Temp:  [98.5 °F (36.9 °C)-99 °F (37.2 °C)] 99 °F (37.2 °C)  Pulse:  [73-84] 78  Resp:  [17-18] 18  SpO2:  [96 %-99 %] 98 %  BP: ()/(50-74) 147/73     Weight: 97.1 kg (214 lb) (01/01/25 0538)  Body mass index is 32.54 kg/m².  Body surface area is 2.16 meters squared.    I/O last 3 completed shifts:  In: 500 [Other:500]  Out: 3225 [Urine:725; Other:2500]     Physical Exam  Vitals and nursing note reviewed.   Constitutional:       Appearance: She is ill-appearing.   HENT:      Head: Normocephalic.   Eyes:      Conjunctiva/sclera: Conjunctivae normal.   Cardiovascular:      Rate and Rhythm: Normal rate.      Arteriovenous access: Left arteriovenous access is present.     Comments: LUE AVF + thrill  Pulmonary:      Effort: Pulmonary effort is normal. No respiratory distress.   Chest:      Comments: TDC R chest wall with no surrounding erythema  Abdominal:      Palpations: Abdomen is soft.      Tenderness: There is no abdominal tenderness.   Musculoskeletal:      Right lower leg: Edema present.      Left lower leg: Edema present.   Skin:     General: Skin is warm and dry.   Neurological:      Mental Status: She is alert.   Psychiatric:         Mood and Affect: Mood normal.          Significant Labs:  CBC:   Recent Labs   Lab 01/02/25  0526   WBC 6.84   RBC 2.84*   HGB 8.5*   HCT 26.2*      MCV 92   MCH 29.9   MCHC 32.4     CMP:   Recent Labs   Lab 01/02/25  0526   *   CALCIUM 7.8*   ALBUMIN 2.3*   PROT 5.9*   *   K 3.5   CO2 23   CL 98   BUN 21   CREATININE 2.5*   ALKPHOS 92   ALT 10   AST 13   BILITOT 0.2     All labs within the past 24 hours have been reviewed.

## 2025-01-02 NOTE — PROGRESS NOTES
[facility]  Infectious Disease  Progress Note    Patient Name: Cecile Bowen  MRN: [id number]  Admission Date: 12/31/2024  Length of Stay: 2 days  Attending Physician: Fariha Abraham MD  Primary Care Provider: Lurdes Navarro MD    Isolation Status: No active isolations    Assessment/Plan:      ID  * Severe sepsis  Ms. Bowen is a pleasant 75 yo woman with ESRD admitted with enterococcal sepsis. Unfortunately, her urine is growing a GNB (her Permacath would be the next best bet)    Recommendations  Continue abx - Zosyn (also covers GNB in urine) and daptomycin for Enterococcus pending AST (plan to de-escalate once final AST has returned, if not sooner)  FU repeat blood cultures  If her Permacath is the source, it should be removed    Dominic Darby MD  Infectious Disease  [facility]    Subjective:     Principal Problem:Severe sepsis    HPI: Ms. Bowen is a pleasant 74 y.o. woman with ESRD on HD,and chronic suprapubic catheter for neurogenic bladder who presented to the [facility] 12/31 from Vascular Surgery for evaluation of fever and AMS.  She reports that she was well until she went to see her Vascular Surgeon for right IJ PermCath removal. She is currently dialyzing only Mondays and Fridays via right IJ PermCath.  However, upon arrival to clinic she was febrile to 100.9F, slow to respond and tachy to 104.  She was sent to the ER for further evaluation.  In the ED, she was febrile. Blood and urine studies were obtained and the patient was admitted with likely sepsis. Today, her blood cultures are growing GPC in chains, likely Enterococcus by BCID. ID is consulted for bacteremia. Today, the patient is clear and feeling better. She tells me that she usually notices a change in the color of her urine when she has a UTI and that her urine has been clear. No rigors today. Her Permacath remains in place.  Interval History: Feels okay. Getting TTE.  No fever or chills. Confusion resolved.    Review of Systems   All other systems reviewed and are negative.    Objective:     Vital Signs (Most Recent):  Temp: 99 °F (37.2 °C) (01/02/25 0727)  Pulse: 78 (01/02/25 0727)  Resp: 18 (01/02/25 1121)  BP: (!) 147/73 (01/02/25 0727)  SpO2: 98 % (01/02/25 0727) Vital Signs (24h Range):  Temp:  [98.5 °F (36.9 °C)-99 °F (37.2 °C)] 99 °F (37.2 °C)  Pulse:  [73-84] 78  Resp:  [18] 18  SpO2:  [96 %-98 %] 98 %  BP: ()/(50-74) 147/73     Weight: 97.1 kg (214 lb)  Body mass index is 32.54 kg/m².    Estimated Creatinine Clearance: 24.8 mL/min (A) (based on SCr of 2.5 mg/dL (H)).     Physical Exam  Vitals and nursing note reviewed.   Constitutional:       Appearance: Normal appearance.   HENT:      Head: Normocephalic and atraumatic.   Neurological:      General: No focal deficit present.      Mental Status: She is alert and oriented to person, place, and time.   Psychiatric:         Mood and Affect: Mood normal.         Behavior: Behavior normal.         Thought Content: Thought content normal.         Judgment: Judgment normal.          Significant Labs: BMP:   Recent Labs   Lab 01/02/25  0526   *   *   K 3.5   CL 98   CO2 23   BUN 21   CREATININE 2.5*   CALCIUM 7.8*   MG 1.6     CBC:   Recent Labs   Lab 01/01/25  0419 01/02/25  0526   WBC 12.28 6.84   HGB 8.7* 8.5*   HCT 27.8* 26.2*    245     Microbiology Results (last 7 days)       Procedure Component Value Units Date/Time    Blood culture x two cultures. Draw prior to antibiotics. [[id number]]  (Abnormal) Collected: 12/31/24 0984    Order Status: Completed Specimen: Blood from Peripheral, Antecubital, Right Updated: 01/02/25 0826     Blood Culture, Routine Gram stain aer bottle: Gram positive cocci in chains resembling Strep      Gram stain zelda bottle: Gram positive cocci in chains resembling Strep      Results called to and read back by: Sean Hernandez RN  01/01/2025  10:31      ENTEROCOCCUS  SPECIES  Identification pending  For susceptibility see order #[id number]      Narrative:      Aerobic and anaerobic    Blood culture x two cultures. Draw prior to antibiotics. [[id number]]  (Abnormal) Collected: 12/31/24 0937    Order Status: Completed Specimen: Blood from Peripheral, Antecubital, Right Updated: 01/02/25 0824     Blood Culture, Routine Gram stain aer bottle: Gram positive cocci in chains resembling Strep      Gram stain zelda bottle: Gram positive cocci in chains resembling Strep      Results called to and read back by: Sean Hernandez RN  01/01/2025  10:31      ENTEROCOCCUS SPECIES  Identification and susceptibility pending      Narrative:      Aerobic and anaerobic    Blood culture [[id number]] Collected: 01/02/25 0529    Order Status: Sent Specimen: Blood from Peripheral, Hand, Right Updated: 01/02/25 0556    Blood culture [[id number]] Collected: 01/02/25 0526    Order Status: Sent Specimen: Blood from Peripheral, Antecubital, Right Updated: 01/02/25 0556    Urine culture [[id number]]  (Abnormal) Collected: 12/31/24 1049    Order Status: Completed Specimen: Urine Updated: 01/01/25 1751     Urine Culture, Routine GRAM NEGATIVE FERNANDEZ  50,000 - 99,999 cfu/ml  Identification and susceptibility pending      Narrative:      Specimen Source->Urine    Rapid Organism ID by PCR (from Blood culture) [[id number]]  (Abnormal) Collected: 12/31/24 0937    Order Status: Completed Updated: 01/01/25 1149     Enterococcus faecalis Detected     Enterococcus faecium Not Detected     Listeria monocytogenes Not Detected     Staphylococcus spp. Not Detected     Staphylococcus aureus Not Detected     Staphylococcus epidermidis Not Detected     Staphylococcus lugdunensis Not Detected     Streptococcus species Not Detected     Streptococcus agalactiae Not Detected     Streptococcus pneumoniae Not Detected     Streptococcus pyogenes Not Detected     Acinetobacter calcoaceticus/baumannii complex Not Detected     Bacteroides  fragilis Not Detected     Enterobacterales Not Detected     Enterobacter cloacae complex Not Detected     Escherichia coli Not Detected     Klebsiella aerogenes Not Detected     Klebsiella oxytoca Not Detected     Klebsiella pneumoniae group Not Detected     Proteus Not Detected     Salmonella sp Not Detected     Serratia marcescens Not Detected     Haemophilus influenzae Not Detected     Neisseria meningtidis Not Detected     Pseudomonas aeruginosa Not Detected     Stenotrophomonas maltophilia Not Detected     Candida albicans Not Detected     Candida auris Not Detected     Candida glabrata Not Detected     Candida krusei Not Detected     Candida parapsilosis Not Detected     Candida tropicalis Not Detected     Cryptococcus neoformans/gattii Not Detected     CTX-M (ESBL ) Test Not Applicable     IMP (Carbapenem resistant) Test Not Applicable     KPC resistance gene (Carbapenem resistant) Test Not Applicable     mcr-1  Test Not Applicable     mec A/C  Test Not Applicable     mec A/C and MREJ (MRSA) gene Test Not Applicable     NDM (Carbapenem resistant) Test Not Applicable     OXA-48-like (Carbapenem resistant) Test Not Applicable     van A/B (VRE gene) Not Detected     VIM (Carbapenem resistant) Test Not Applicable    Narrative:      Aerobic and anaerobic    Urine Culture High Risk [[id number]]     Order Status: Completed Specimen: Urine, Catheterized             Significant Imaging: I have reviewed all pertinent imaging results/findings within the past 24 hours.

## 2025-01-02 NOTE — SUBJECTIVE & OBJECTIVE
1/2/25: No acute events overnight. Pending vascular US for hemodialysis access.     Medications:  Continuous Infusions:  Scheduled Meds:   anastrozole  1 mg Oral Daily    apixaban  5 mg Oral BID    atorvastatin  80 mg Oral Daily    DAPTOmycin (CUBICIN) IV (PEDS and ADULTS)  10 mg/kg Intravenous Q48H    DULoxetine  40 mg Oral Daily    ipratropium  2 spray Each Nostril BID    levothyroxine  50 mcg Oral Before breakfast    oxybutynin  10 mg Oral Daily    piperacillin-tazobactam (Zosyn) IV (PEDS and ADULTS) (extended infusion is not appropriate)  4.5 g Intravenous Q12H    sodium bicarbonate  650 mg Oral Daily     PRN Meds:  Current Facility-Administered Medications:     acetaminophen, 650 mg, Oral, Q4H PRN    dextrose 50%, 12.5 g, Intravenous, PRN    dextrose 50%, 25 g, Intravenous, PRN    glucagon (human recombinant), 1 mg, Intramuscular, PRN    glucose, 16 g, Oral, PRN    glucose, 24 g, Oral, PRN    heparin (porcine), 1,000 Units, Intra-Catheter, PRN    HYDROcodone-acetaminophen, 1 tablet, Oral, Q4H PRN    methocarbamoL, 750 mg, Oral, TID PRN    naloxone, 0.02 mg, Intravenous, PRN    ondansetron, 8 mg, Intravenous, Q6H PRN    polyethylene glycol, 17 g, Oral, Daily PRN    sodium chloride 0.9%, 5 mL, Intravenous, PRN    traZODone, 100 mg, Oral, Nightly PRN     Objective:     Vital Signs (Most Recent):  Temp: 99 °F (37.2 °C) (01/02/25 0727)  Pulse: 78 (01/02/25 0727)  Resp: 18 (01/02/25 0727)  BP: (!) 147/73 (01/02/25 0727)  SpO2: 98 % (01/02/25 0727) Vital Signs (24h Range):  Temp:  [98.5 °F (36.9 °C)-99 °F (37.2 °C)] 99 °F (37.2 °C)  Pulse:  [70-84] 78  Resp:  [17-18] 18  SpO2:  [96 %-99 %] 98 %  BP: ()/(50-74) 147/73          Physical Exam    Physical Exam  Constitutional:       General: She is not in acute distress.     Appearance: Normal appearance. She is not ill-appearing.   HENT:      Head: Normocephalic and atraumatic.      Nose: Nose normal.      Mouth/Throat:      Mouth: Mucous membranes are moist.    Eyes:      Extraocular Movements: Extraocular movements intact.      Conjunctiva/sclera: Conjunctivae normal.   Cardiovascular:      Rate and Rhythm: Normal rate and regular rhythm.   Pulmonary:      Effort: Pulmonary effort is normal. No respiratory distress.   Abdominal:      General: There is no distension.      Palpations: Abdomen is soft.   Musculoskeletal:      Cervical back: Normal range of motion.   Skin:     General: Skin is warm and dry.   Neurological:      General: No focal deficit present. L hand 5/5  strength     Mental Status: She is alert and oriented to person, place, and time. Mental status is at baseline.   Vascular:     LUE brachiocephalic AV fistula with thrill but no significant pulsality     Significant Labs:  All pertinent labs from the last 24 hours have been reviewed.    Significant Diagnostics:  U/S: No results found in the last 24 hours.    I have reviewed all pertinent imaging results/findings within the past 24 hours.

## 2025-01-02 NOTE — ASSESSMENT & PLAN NOTE
Creatine stable for now. BMP reviewed- noted Estimated Creatinine Clearance: 24.8 mL/min (A) (based on SCr of 2.5 mg/dL (H)). according to latest data. Based on current GFR, CKD stage is end stage.  Monitor UOP and serial BMP and adjust therapy as needed. Renally dose meds. Avoid nephrotoxic medications and procedures.    Nephro consult  Has been getting HD MF via right IJ PermCath  Needs f/u with Vascular Surgery for IJ removal  Vascular Surgery consulted for problem cannulating HD access

## 2025-01-03 LAB
ALBUMIN SERPL BCP-MCNC: 2.4 G/DL (ref 3.5–5.2)
ALP SERPL-CCNC: 95 U/L (ref 40–150)
ALT SERPL W/O P-5'-P-CCNC: 11 U/L (ref 10–44)
ANION GAP SERPL CALC-SCNC: 10 MMOL/L (ref 8–16)
AST SERPL-CCNC: 17 U/L (ref 10–40)
BACTERIA BLD CULT: ABNORMAL
BASOPHILS # BLD AUTO: 0.05 K/UL (ref 0–0.2)
BASOPHILS NFR BLD: 0.7 % (ref 0–1.9)
BILIRUB SERPL-MCNC: 0.3 MG/DL (ref 0.1–1)
BUN SERPL-MCNC: 33 MG/DL (ref 8–23)
CALCIUM SERPL-MCNC: 8.1 MG/DL (ref 8.7–10.5)
CHLORIDE SERPL-SCNC: 95 MMOL/L (ref 95–110)
CO2 SERPL-SCNC: 22 MMOL/L (ref 23–29)
CREAT SERPL-MCNC: 2.9 MG/DL (ref 0.5–1.4)
DIFFERENTIAL METHOD BLD: ABNORMAL
EOSINOPHIL # BLD AUTO: 0.3 K/UL (ref 0–0.5)
EOSINOPHIL NFR BLD: 4.5 % (ref 0–8)
ERYTHROCYTE [DISTWIDTH] IN BLOOD BY AUTOMATED COUNT: 13.2 % (ref 11.5–14.5)
EST. GFR  (NO RACE VARIABLE): 16.7 ML/MIN/1.73 M^2
GLUCOSE SERPL-MCNC: 120 MG/DL (ref 70–110)
HCT VFR BLD AUTO: 30.4 % (ref 37–48.5)
HGB BLD-MCNC: 9.3 G/DL (ref 12–16)
IMM GRANULOCYTES # BLD AUTO: 0.08 K/UL (ref 0–0.04)
IMM GRANULOCYTES NFR BLD AUTO: 1.1 % (ref 0–0.5)
LYMPHOCYTES # BLD AUTO: 1.9 K/UL (ref 1–4.8)
LYMPHOCYTES NFR BLD: 24.8 % (ref 18–48)
MAGNESIUM SERPL-MCNC: 1.6 MG/DL (ref 1.6–2.6)
MCH RBC QN AUTO: 29.5 PG (ref 27–31)
MCHC RBC AUTO-ENTMCNC: 30.6 G/DL (ref 32–36)
MCV RBC AUTO: 97 FL (ref 82–98)
MONOCYTES # BLD AUTO: 0.8 K/UL (ref 0.3–1)
MONOCYTES NFR BLD: 10.7 % (ref 4–15)
NEUTROPHILS # BLD AUTO: 4.4 K/UL (ref 1.8–7.7)
NEUTROPHILS NFR BLD: 58.2 % (ref 38–73)
NRBC BLD-RTO: 0 /100 WBC
PHOSPHATE SERPL-MCNC: 5.1 MG/DL (ref 2.7–4.5)
PLATELET # BLD AUTO: 235 K/UL (ref 150–450)
PMV BLD AUTO: 8.8 FL (ref 9.2–12.9)
POCT GLUCOSE: 138 MG/DL (ref 70–110)
POCT GLUCOSE: 169 MG/DL (ref 70–110)
POCT GLUCOSE: 195 MG/DL (ref 70–110)
POCT GLUCOSE: 216 MG/DL (ref 70–110)
POTASSIUM SERPL-SCNC: 4.2 MMOL/L (ref 3.5–5.1)
PROT SERPL-MCNC: 6.5 G/DL (ref 6–8.4)
RBC # BLD AUTO: 3.15 M/UL (ref 4–5.4)
SODIUM SERPL-SCNC: 127 MMOL/L (ref 136–145)
WBC # BLD AUTO: 7.51 K/UL (ref 3.9–12.7)

## 2025-01-03 PROCEDURE — 63600175 PHARM REV CODE 636 W HCPCS: Mod: HCNC | Performed by: HOSPITALIST

## 2025-01-03 PROCEDURE — 21400001 HC TELEMETRY ROOM: Mod: HCNC

## 2025-01-03 PROCEDURE — 80053 COMPREHEN METABOLIC PANEL: CPT | Mod: HCNC | Performed by: HOSPITALIST

## 2025-01-03 PROCEDURE — 25000003 PHARM REV CODE 250: Mod: HCNC | Performed by: HOSPITALIST

## 2025-01-03 PROCEDURE — 25000003 PHARM REV CODE 250: Mod: HCNC

## 2025-01-03 PROCEDURE — 85025 COMPLETE CBC W/AUTO DIFF WBC: CPT | Mod: HCNC | Performed by: HOSPITALIST

## 2025-01-03 PROCEDURE — 36415 COLL VENOUS BLD VENIPUNCTURE: CPT | Mod: HCNC | Performed by: HOSPITALIST

## 2025-01-03 PROCEDURE — 84100 ASSAY OF PHOSPHORUS: CPT | Mod: HCNC | Performed by: HOSPITALIST

## 2025-01-03 PROCEDURE — 83735 ASSAY OF MAGNESIUM: CPT | Mod: HCNC | Performed by: HOSPITALIST

## 2025-01-03 PROCEDURE — 99232 SBSQ HOSP IP/OBS MODERATE 35: CPT | Mod: HCNC,,, | Performed by: INTERNAL MEDICINE

## 2025-01-03 RX ORDER — MUPIROCIN 20 MG/G
OINTMENT TOPICAL 2 TIMES DAILY
Status: DISPENSED | OUTPATIENT
Start: 2025-01-03 | End: 2025-01-08

## 2025-01-03 RX ORDER — CARVEDILOL 12.5 MG/1
12.5 TABLET ORAL 2 TIMES DAILY
Status: DISCONTINUED | OUTPATIENT
Start: 2025-01-03 | End: 2025-01-11 | Stop reason: HOSPADM

## 2025-01-03 RX ORDER — LOSARTAN POTASSIUM 50 MG/1
100 TABLET ORAL DAILY
Status: DISCONTINUED | OUTPATIENT
Start: 2025-01-03 | End: 2025-01-11 | Stop reason: HOSPADM

## 2025-01-03 RX ADMIN — HYDROCODONE BITARTRATE AND ACETAMINOPHEN 1 TABLET: 10; 325 TABLET ORAL at 08:01

## 2025-01-03 RX ADMIN — CARVEDILOL 12.5 MG: 12.5 TABLET, FILM COATED ORAL at 12:01

## 2025-01-03 RX ADMIN — DULOXETINE HYDROCHLORIDE 40 MG: 20 CAPSULE, DELAYED RELEASE ORAL at 09:01

## 2025-01-03 RX ADMIN — IPRATROPIUM BROMIDE 2 SPRAY: 42 SPRAY, METERED NASAL at 09:01

## 2025-01-03 RX ADMIN — PIPERACILLIN SODIUM AND TAZOBACTAM SODIUM 4.5 G: 4; .5 INJECTION, POWDER, LYOPHILIZED, FOR SOLUTION INTRAVENOUS at 05:01

## 2025-01-03 RX ADMIN — APIXABAN 5 MG: 5 TABLET, FILM COATED ORAL at 08:01

## 2025-01-03 RX ADMIN — MUPIROCIN: 20 OINTMENT TOPICAL at 09:01

## 2025-01-03 RX ADMIN — APIXABAN 5 MG: 5 TABLET, FILM COATED ORAL at 09:01

## 2025-01-03 RX ADMIN — SODIUM BICARBONATE 650 MG TABLET 650 MG: at 08:01

## 2025-01-03 RX ADMIN — IPRATROPIUM BROMIDE 2 SPRAY: 42 SPRAY, METERED NASAL at 12:01

## 2025-01-03 RX ADMIN — HYDROCODONE BITARTRATE AND ACETAMINOPHEN 1 TABLET: 10; 325 TABLET ORAL at 09:01

## 2025-01-03 RX ADMIN — ANASTROZOLE 1 MG: 1 TABLET, COATED ORAL at 08:01

## 2025-01-03 RX ADMIN — HYDROCODONE BITARTRATE AND ACETAMINOPHEN 1 TABLET: 10; 325 TABLET ORAL at 02:01

## 2025-01-03 RX ADMIN — LEVOTHYROXINE SODIUM 50 MCG: 0.05 TABLET ORAL at 05:01

## 2025-01-03 RX ADMIN — TRAZODONE HYDROCHLORIDE 100 MG: 100 TABLET ORAL at 09:01

## 2025-01-03 RX ADMIN — OXYBUTYNIN CHLORIDE 10 MG: 10 TABLET, EXTENDED RELEASE ORAL at 08:01

## 2025-01-03 RX ADMIN — LOSARTAN POTASSIUM 100 MG: 50 TABLET, FILM COATED ORAL at 12:01

## 2025-01-03 RX ADMIN — ATORVASTATIN CALCIUM 80 MG: 40 TABLET, FILM COATED ORAL at 08:01

## 2025-01-03 RX ADMIN — CARVEDILOL 12.5 MG: 12.5 TABLET, FILM COATED ORAL at 09:01

## 2025-01-03 NOTE — ASSESSMENT & PLAN NOTE
Anemia is likely due to chronic disease due to ESRD. Most recent hemoglobin and hematocrit are listed below.  Recent Labs     01/01/25  0419 01/02/25  0526 01/03/25  0554   HGB 8.7* 8.5* 9.3*   HCT 27.8* 26.2* 30.4*       Plan  - Monitor serial CBC: Daily  - Transfuse PRBC if patient becomes hemodynamically unstable, symptomatic or H/H drops below 7/21.  - Patient has not received any PRBC transfusions to date  - Patient's anemia is currently dropping, no source of bleeding possibly a lab error, monitor for now

## 2025-01-03 NOTE — ASSESSMENT & PLAN NOTE
Patient's blood pressure range in the last 24 hours was: BP  Min: 149/76  Max: 190/81.The patient's inpatient anti-hypertensive regimen is listed below:  Current Antihypertensives  losartan tablet 100 mg, Daily, Oral  carvediloL tablet 12.5 mg, 2 times daily, Oral    Plan  - BP increasing since holding BP meds for sepsis  - Restart Coreg and Losartan.  Can consider addition of Norvasc pending trend

## 2025-01-03 NOTE — PROGRESS NOTES
[facility]  Infectious Disease  Progress Note    Patient Name: Cecile Bowen  MRN: [id number]  Admission Date: 12/31/2024  Length of Stay: 3 days  Attending Physician: Fariha Abraham MD  Primary Care Provider: Lurdes Navarro MD    Isolation Status: No active isolations  Assessment/Plan:      ID  * Severe sepsis  Ms. Bowen is a pleasant 75 yo woman with ESRD admitted with enterococcal bacteremia. The most likely source is her permacath.Her urine is growing Proteus.    Recommendations  Continue abx - Zosyn (also covers GNB in urine) and daptomycin for Enterococcus  Remove permacath if no longer needed  FU repeat blood cultures  Await sensitivities for E.faecalis - may discharge on vancomycin with dialysis.     Discussed with primary team.      Thank you for your consult. I will follow-up with patient. Please contact us if you have any additional questions.    Manan Finch MD  Infectious Disease  [facility]    Subjective:     Principal Problem:Severe sepsis    HPI: Ms. Bowen is a pleasant 74 y.o. woman with ESRD on HD,and chronic suprapubic catheter for neurogenic bladder who presented to the [facility] ED 12/31 from Vascular Surgery for evaluation of fever and AMS.  She reports that she was well until she went to see her Vascular Surgeon for right IJ PermCath removal. She is currently dialyzing only Mondays and Fridays via right IJ PermCath.  However, upon arrival to clinic she was febrile to 100.9F, slow to respond and tachy to 104.  She was sent to the ER for further evaluation.  In the ED, she was febrile. Blood and urine studies were obtained and the patient was admitted with likely sepsis. Today, her blood cultures are growing GPC in chains, likely Enterococcus by BCID. ID is consulted for bacteremia. Today, the patient is clear and feeling better. She tells me that she usually notices a change in the color of her urine when she has a UTI and  that her urine has been clear. No rigors today. Her Permacath remains in place.  Interval History: Afebrile. Still awaiting blood culture sensitivities.    Review of Systems   All other systems reviewed and are negative.    Objective:     Vital Signs (Most Recent):  Temp: 98.8 °F (37.1 °C) (01/03/25 1606)  Pulse: 98 (01/03/25 1606)  Resp: 18 (01/03/25 1606)  BP: (!) 176/91 (01/03/25 1606)  SpO2: (!) 78 % (01/03/25 1606) Vital Signs (24h Range):  Temp:  [97.5 °F (36.4 °C)-98.8 °F (37.1 °C)] 98.8 °F (37.1 °C)  Pulse:  [69-98] 98  Resp:  [18] 18  SpO2:  [78 %-99 %] 78 %  BP: (149-190)/(76-95) 176/91     Weight: 97.1 kg (214 lb)  Body mass index is 32.54 kg/m².    Estimated Creatinine Clearance: 21.4 mL/min (A) (based on SCr of 2.9 mg/dL (H)).     Physical Exam  Vitals and nursing note reviewed.          Significant Labs: Blood Culture:   Recent Labs   Lab 12/31/24  0937 01/02/25  0526 01/02/25  0529   LABBLOO Gram stain aer bottle: Gram positive cocci in chains resembling Strep  Gram stain zelda bottle: Gram positive cocci in chains resembling Strep  Results called to and read back by: Sean Hernandez RN  01/01/2025  10:31  ENTEROCOCCUS FAECALIS  For susceptibility see order #[id number]  *  Gram stain aer bottle: Gram positive cocci in chains resembling Strep  Gram stain zelda bottle: Gram positive cocci in chains resembling Strep  Results called to and read back by: Sean Hernandez RN  01/01/2025  10:31  ENTEROCOCCUS FAECALIS* No Growth to date  No Growth to date No Growth to date  No Growth to date     Urine Culture:   Recent Labs   Lab 12/31/24  1049   LABURIN GRAM NEGATIVE FERNANDEZ  50,000 - 99,999 cfu/ml  Identification and susceptibility pending  *  PROTEUS MIRABILIS  10,000 - 49,999 cfu/ml  Identification pending  *       Significant Imaging: None

## 2025-01-03 NOTE — SUBJECTIVE & OBJECTIVE
Interval History: No acute events overnight.  Urine cx returned with GNR and Proteus. On Zosyn.  Blood cx 1/2 NGTD.  Hopeful to DC this weekend if blood cx remain negative and cleared by ID for outpatient IV abx with HD.    Review of Systems   Constitutional:  Negative for chills, fatigue and fever.   Respiratory:  Negative for cough and shortness of breath.    Cardiovascular:  Negative for chest pain, palpitations and leg swelling.   Gastrointestinal:  Negative for abdominal pain, diarrhea, nausea and vomiting.   Genitourinary:  Negative for dysuria and urgency.   Musculoskeletal:  Positive for back pain.   Neurological:  Negative for dizziness and headaches.   All other systems reviewed and are negative.    Objective:     Vital Signs (Most Recent):  Temp: 98.6 °F (37 °C) (01/03/25 0747)  Pulse: 74 (01/03/25 0747)  Resp: 18 (01/03/25 0844)  BP: (!) 164/76 (01/03/25 0845)  SpO2: 98 % (01/03/25 0747) Vital Signs (24h Range):  Temp:  [97.5 °F (36.4 °C)-98.8 °F (37.1 °C)] 98.6 °F (37 °C)  Pulse:  [69-81] 74  Resp:  [18] 18  SpO2:  [95 %-98 %] 98 %  BP: (149-190)/(70-95) 164/76     Weight: 97.1 kg (214 lb)  Body mass index is 32.54 kg/m².    Intake/Output Summary (Last 24 hours) at 1/3/2025 0968  Last data filed at 1/3/2025 0654  Gross per 24 hour   Intake --   Output 450 ml   Net -450 ml      Physical Exam  Constitutional:       Appearance: Normal appearance. She is obese.   HENT:      Head: Normocephalic and atraumatic.   Cardiovascular:      Rate and Rhythm: Normal rate and regular rhythm.      Heart sounds: No murmur heard.  Pulmonary:      Effort: Pulmonary effort is normal. No respiratory distress.      Breath sounds: Normal breath sounds. No wheezing or rales.   Abdominal:      General: There is no distension.      Palpations: Abdomen is soft.      Tenderness: There is no abdominal tenderness.   Genitourinary:     Comments: Suprapubic catheter  Musculoskeletal:         General: No deformity.   Skin:     General:  "Skin is warm and dry.      Coloration: Skin is pale.   Neurological:      General: No focal deficit present.      Mental Status: She is alert and oriented to person, place, and time. Mental status is at baseline.         MELD 3.0: 25 at 1/2/2025  5:26 AM  MELD-Na: 25 at 1/2/2025  5:26 AM  Calculated from:  Serum Creatinine: On dialysis. Using the maximum value.  Serum Sodium: 130 mmol/L at 1/2/2025  5:26 AM  Total Bilirubin: 0.2 mg/dL (Using min of 1 mg/dL) at 1/2/2025  5:26 AM  Serum Albumin: 2.3 g/dL at 1/2/2025  5:26 AM  INR(ratio): 1 at 12/31/2024  9:37 AM  Age at listing (hypothetical): 74 years  Sex: Female at 1/2/2025  5:26 AM      Significant Labs:  CBC:  Recent Labs   Lab 01/02/25  0526 01/03/25  0554   WBC 6.84 7.51   HGB 8.5* 9.3*   HCT 26.2* 30.4*    235     CMP:  Recent Labs   Lab 01/02/25  0526 01/03/25  0554   * 127*   K 3.5 4.2   CL 98 95   CO2 23 22*   * 120*   BUN 21 33*   CREATININE 2.5* 2.9*   CALCIUM 7.8* 8.1*   PROT 5.9* 6.5   ALBUMIN 2.3* 2.4*   BILITOT 0.2 0.3   ALKPHOS 92 95   AST 13 17   ALT 10 11   ANIONGAP 9 10     PTINR:  No results for input(s): "INR" in the last 48 hours.        "

## 2025-01-03 NOTE — ASSESSMENT & PLAN NOTE
Severe sepsis 2/2 CAUTI and Enterococcus bacteremia  On arrival febrile to 101.7F, tachycardic 102 with WBC 21 and slow to respond (encephalopathy)  Suprapubic catheter changed in the ED  WBC down to 12, Procal 1.4  Urine cx with GNR and Proteus.  Continue Zosyn:  Chart review shows history of Pseudomonas, as well as ESBL UTIs  Blood cx 12.31 with Enterococcus faecalis  Of note, does have a tunneled line, will need to determine if it needs to be removed vs treated through.  Vascular Surgery was planning to remove the line on 12/31  Blood cx 1.2 NGTD  Continue Dapto for Enterococcus faecalis bacteremia

## 2025-01-03 NOTE — ASSESSMENT & PLAN NOTE
Ms. Bowen is a pleasant 73 yo woman with ESRD admitted with enterococcal bacteremia. The most likely source is her permacath.Her urine is growing Proteus.    Recommendations  Continue abx - Zosyn (also covers GNB in urine) and daptomycin for Enterococcus  Remove permacath if no longer needed  FU repeat blood cultures  Await sensitivities for E.faecalis - may discharge on vancomycin with dialysis.

## 2025-01-03 NOTE — ASSESSMENT & PLAN NOTE
Hyponatremia is likely due to volume overload from ESRD. The patient's most recent sodium results are listed below.  Recent Labs     01/01/25  0419 01/02/25  0526 01/03/25  0554   * 130* 127*       Plan  - Correct the sodium by 4-6mEq in 24 hours.   - Will treat the hyponatremia with HD  - Monitor sodium Daily.   - Patient hyponatremia is stable

## 2025-01-03 NOTE — SUBJECTIVE & OBJECTIVE
Medications:  Continuous Infusions:  Scheduled Meds:   anastrozole  1 mg Oral Daily    apixaban  5 mg Oral BID    atorvastatin  80 mg Oral Daily    DAPTOmycin (CUBICIN) IV (PEDS and ADULTS)  10 mg/kg Intravenous Q48H    DULoxetine  40 mg Oral Daily    epoetin chloe-ebpx (RETACRIT) injection  50 Units/kg Intravenous Every Mon, Wed, Fri    ipratropium  2 spray Each Nostril BID    levothyroxine  50 mcg Oral Before breakfast    oxybutynin  10 mg Oral Daily    piperacillin-tazobactam (Zosyn) IV (PEDS and ADULTS) (extended infusion is not appropriate)  4.5 g Intravenous Q12H    sodium bicarbonate  650 mg Oral Daily     PRN Meds:  Current Facility-Administered Medications:     acetaminophen, 650 mg, Oral, Q4H PRN    dextrose 50%, 12.5 g, Intravenous, PRN    dextrose 50%, 25 g, Intravenous, PRN    glucagon (human recombinant), 1 mg, Intramuscular, PRN    glucose, 16 g, Oral, PRN    glucose, 24 g, Oral, PRN    heparin (porcine), 1,000 Units, Intra-Catheter, PRN    HYDROcodone-acetaminophen, 1 tablet, Oral, Q4H PRN    methocarbamoL, 750 mg, Oral, TID PRN    naloxone, 0.02 mg, Intravenous, PRN    ondansetron, 8 mg, Intravenous, Q6H PRN    polyethylene glycol, 17 g, Oral, Daily PRN    sodium chloride 0.9%, 5 mL, Intravenous, PRN    traZODone, 100 mg, Oral, Nightly PRN     Objective:     Vital Signs (Most Recent):  Temp: 97.5 °F (36.4 °C) (01/03/25 0431)  Pulse: 74 (01/03/25 0535)  Resp: 18 (01/03/25 0431)  BP: (!) 153/79 (01/03/25 0431)  SpO2: 96 % (01/03/25 0431) Vital Signs (24h Range):  Temp:  [97.5 °F (36.4 °C)-99 °F (37.2 °C)] 97.5 °F (36.4 °C)  Pulse:  [69-81] 74  Resp:  [18] 18  SpO2:  [95 %-98 %] 96 %  BP: (147-175)/(70-95) 153/79          Physical Exam  Constitutional:       General: She is not in acute distress.     Appearance: Normal appearance. She is not ill-appearing.   HENT:      Head: Normocephalic and atraumatic.      Nose: Nose normal.      Mouth/Throat:      Mouth: Mucous membranes are moist.   Eyes:       Extraocular Movements: Extraocular movements intact.      Conjunctiva/sclera: Conjunctivae normal.   Cardiovascular:      Rate and Rhythm: Normal rate and regular rhythm.      Comments: Palpable thrill in LUE AVF  Pulmonary:      Effort: Pulmonary effort is normal. No respiratory distress.   Abdominal:      General: There is no distension.      Palpations: Abdomen is soft.   Musculoskeletal:      Cervical back: Normal range of motion.   Skin:     General: Skin is warm and dry.   Neurological:      General: No focal deficit present.      Mental Status: She is alert and oriented to person, place, and time. Mental status is at baseline.          Significant Labs:  CBC:   Recent Labs   Lab 01/05/25  0511   WBC 8.49   RBC 3.01*   HGB 9.1*   HCT 28.2*      MCV 94   MCH 30.2   MCHC 32.3     CMP:   Recent Labs   Lab 01/04/25  0839   *   CALCIUM 7.7*   ALBUMIN 2.3*   *   K 4.2   CO2 18*   CL 94*   BUN 46*   CREATININE 3.2*       Significant Diagnostics:  I have reviewed all pertinent imaging results/findings within the past 24 hours.

## 2025-01-03 NOTE — ASSESSMENT & PLAN NOTE
Creatine stable for now. BMP reviewed- noted Estimated Creatinine Clearance: 21.4 mL/min (A) (based on SCr of 2.9 mg/dL (H)). according to latest data. Based on current GFR, CKD stage is end stage.  Monitor UOP and serial BMP and adjust therapy as needed. Renally dose meds. Avoid nephrotoxic medications and procedures.    Nephro consult  Has been getting HD MF via right IJ PermCath  Needs f/u with Vascular Surgery for IJ removal  Vascular Surgery consulted for problem cannulating HD access - Said OK to cannulate

## 2025-01-03 NOTE — SUBJECTIVE & OBJECTIVE
Interval History: Afebrile. Still awaiting blood culture sensitivities.    Review of Systems   All other systems reviewed and are negative.    Objective:     Vital Signs (Most Recent):  Temp: 98.8 °F (37.1 °C) (01/03/25 1606)  Pulse: 98 (01/03/25 1606)  Resp: 18 (01/03/25 1606)  BP: (!) 176/91 (01/03/25 1606)  SpO2: (!) 78 % (01/03/25 1606) Vital Signs (24h Range):  Temp:  [97.5 °F (36.4 °C)-98.8 °F (37.1 °C)] 98.8 °F (37.1 °C)  Pulse:  [69-98] 98  Resp:  [18] 18  SpO2:  [78 %-99 %] 78 %  BP: (149-190)/(76-95) 176/91     Weight: 97.1 kg (214 lb)  Body mass index is 32.54 kg/m².    Estimated Creatinine Clearance: 21.4 mL/min (A) (based on SCr of 2.9 mg/dL (H)).     Physical Exam  Vitals and nursing note reviewed.          Significant Labs: Blood Culture:   Recent Labs   Lab 12/31/24  0937 01/02/25  0526 01/02/25  0529   LABBLOO Gram stain aer bottle: Gram positive cocci in chains resembling Strep  Gram stain zelda bottle: Gram positive cocci in chains resembling Strep  Results called to and read back by: Sean Hernandez RN  01/01/2025  10:31  ENTEROCOCCUS FAECALIS  For susceptibility see order #[id number]  *  Gram stain aer bottle: Gram positive cocci in chains resembling Strep  Gram stain zelda bottle: Gram positive cocci in chains resembling Strep  Results called to and read back by: Sean Hernandez RN  01/01/2025  10:31  ENTEROCOCCUS FAECALIS* No Growth to date  No Growth to date No Growth to date  No Growth to date     Urine Culture:   Recent Labs   Lab 12/31/24  1049   LABURIN GRAM NEGATIVE FERNANDEZ  50,000 - 99,999 cfu/ml  Identification and susceptibility pending  *  PROTEUS MIRABILIS  10,000 - 49,999 cfu/ml  Identification pending  *       Significant Imaging: None

## 2025-01-03 NOTE — PROGRESS NOTES
[facility] - Internal Medicine [facility]  Progress Note    Patient Name: Cecile Bowen  MRN: [id number]  Patient Class: IP- Inpatient   Admission Date: 12/31/2024  Length of Stay: 3 days  Attending Physician: Fariha Abraham MD  Primary Care Provider: Lurdes Navarro MD        Subjective     Principal Problem:Severe sepsis        HPI:  Ms. Cecile Bowen is a 74 y.o. female with ESRD on HD, T2DM, afib on Eliquis, HTN, obesity, history of breast cancer, chronic suprapubic catheter for neurogenic bladder and chronic back pain who presented to the [facility] ED 12/31 from Vascular Surgery for evaluation of fever and AMS.  History is obtained from patient.  She reports that over the last few days, she has been having fever and lethargy.  She went to see her Vascular Surgeon for right IJ PermCath removal, as her fistula has matured.  She is currently dialyzing only Mondays and Fridays via right IJ PermCath.  However, upon arrival to clinic she was febrile to 100.9F, slow to respond and tachy to 104.  She was sent to the ER for further evaluation.  At baseline, she uses a wheelchair.  She lives with her sister.  She currently endorses back pain that is not escalated from normal.  She is full code.  She took all of her medications prior to going to clinic except her Eliquis.    Upon arrival to the ER, vitals were temp 101.7F, , /77.  Labs showed WBC 20, Hgb 11.3, Sodium 129, BUN/Cr 28/3.2, Lactic 0.8.  UA was dirty.  Suprapubic catheter was exchanged in the ED.  CXR was negative.  She was given Vanc and Zosyn was admitted to Hospital Medicine for further management.    Overview/Hospital Course:  Ms. Bowen was admitted to Hospital Medicine for management of severe sepsis 2/2 Enterococcus bacteremia and a suprapubic UTI.  She was started on Zosyn.  After her blood cx returned positive, ID was consulted and she was started on Dapto for Enterococcus. Urine cx returned with GNR and  Proteus.    Interval History: No acute events overnight.  Urine cx returned with GNR and Proteus. On Zosyn.  Blood cx 1/2 NGTD.  Hopeful to DC this weekend if blood cx remain negative and cleared by ID for outpatient IV abx with HD.    Review of Systems   Constitutional:  Negative for chills, fatigue and fever.   Respiratory:  Negative for cough and shortness of breath.    Cardiovascular:  Negative for chest pain, palpitations and leg swelling.   Gastrointestinal:  Negative for abdominal pain, diarrhea, nausea and vomiting.   Genitourinary:  Negative for dysuria and urgency.   Musculoskeletal:  Positive for back pain.   Neurological:  Negative for dizziness and headaches.   All other systems reviewed and are negative.    Objective:     Vital Signs (Most Recent):  Temp: 98.6 °F (37 °C) (01/03/25 0747)  Pulse: 74 (01/03/25 0747)  Resp: 18 (01/03/25 0844)  BP: (!) 164/76 (01/03/25 0845)  SpO2: 98 % (01/03/25 0747) Vital Signs (24h Range):  Temp:  [97.5 °F (36.4 °C)-98.8 °F (37.1 °C)] 98.6 °F (37 °C)  Pulse:  [69-81] 74  Resp:  [18] 18  SpO2:  [95 %-98 %] 98 %  BP: (149-190)/(70-95) 164/76     Weight: 97.1 kg (214 lb)  Body mass index is 32.54 kg/m².    Intake/Output Summary (Last 24 hours) at 1/3/2025 0927  Last data filed at 1/3/2025 0654  Gross per 24 hour   Intake --   Output 450 ml   Net -450 ml      Physical Exam  Constitutional:       Appearance: Normal appearance. She is obese.   HENT:      Head: Normocephalic and atraumatic.   Cardiovascular:      Rate and Rhythm: Normal rate and regular rhythm.      Heart sounds: No murmur heard.  Pulmonary:      Effort: Pulmonary effort is normal. No respiratory distress.      Breath sounds: Normal breath sounds. No wheezing or rales.   Abdominal:      General: There is no distension.      Palpations: Abdomen is soft.      Tenderness: There is no abdominal tenderness.   Genitourinary:     Comments: Suprapubic catheter  Musculoskeletal:         General: No deformity.   Skin:    "  General: Skin is warm and dry.      Coloration: Skin is pale.   Neurological:      General: No focal deficit present.      Mental Status: She is alert and oriented to person, place, and time. Mental status is at baseline.         MELD 3.0: 25 at 1/2/2025  5:26 AM  MELD-Na: 25 at 1/2/2025  5:26 AM  Calculated from:  Serum Creatinine: On dialysis. Using the maximum value.  Serum Sodium: 130 mmol/L at 1/2/2025  5:26 AM  Total Bilirubin: 0.2 mg/dL (Using min of 1 mg/dL) at 1/2/2025  5:26 AM  Serum Albumin: 2.3 g/dL at 1/2/2025  5:26 AM  INR(ratio): 1 at 12/31/2024  9:37 AM  Age at listing (hypothetical): 74 years  Sex: Female at 1/2/2025  5:26 AM      Significant Labs:  CBC:  Recent Labs   Lab 01/02/25  0526 01/03/25  0554   WBC 6.84 7.51   HGB 8.5* 9.3*   HCT 26.2* 30.4*    235     CMP:  Recent Labs   Lab 01/02/25  0526 01/03/25  0554   * 127*   K 3.5 4.2   CL 98 95   CO2 23 22*   * 120*   BUN 21 33*   CREATININE 2.5* 2.9*   CALCIUM 7.8* 8.1*   PROT 5.9* 6.5   ALBUMIN 2.3* 2.4*   BILITOT 0.2 0.3   ALKPHOS 92 95   AST 13 17   ALT 10 11   ANIONGAP 9 10     PTINR:  No results for input(s): "INR" in the last 48 hours.          Assessment and Plan     * Severe sepsis  Severe sepsis 2/2 CAUTI and Enterococcus bacteremia  On arrival febrile to 101.7F, tachycardic 102 with WBC 21 and slow to respond (encephalopathy)  Suprapubic catheter changed in the ED  WBC down to 12, Procal 1.4  Urine cx with GNR and Proteus.  Continue Zosyn:  Chart review shows history of Pseudomonas, as well as ESBL UTIs  Blood cx 12.31 with Enterococcus faecalis  Of note, does have a tunneled line, will need to determine if it needs to be removed vs treated through.  Vascular Surgery was planning to remove the line on 12/31  Blood cx 1.2 NGTD  Continue Dapto for Enterococcus faecalis bacteremia    Toxic encephalopathy  Not confused but slow to respond 2/2 questions, able to hold a conversation and answer questions " appropriately  Likely 2/2 sepsis  Treatment of infection as above  Mentation back to baseline    Bacteremia due to Enterococcus  See severe sepsis      Catheter-associated urinary tract infection  See severe sepsis      HTN (hypertension)  Patient's blood pressure range in the last 24 hours was: BP  Min: 149/76  Max: 190/81.The patient's inpatient anti-hypertensive regimen is listed below:  Current Antihypertensives  losartan tablet 100 mg, Daily, Oral  carvediloL tablet 12.5 mg, 2 times daily, Oral    Plan  - BP increasing since holding BP meds for sepsis  - Restart Coreg and Losartan.  Can consider addition of Norvasc pending trend    Positive JASON (antinuclear antibody)  History noted      Personal history of malignant neoplasm of breast  History noted  Continue Arimidex      ESRD (end stage renal disease) on dialysis  Creatine stable for now. BMP reviewed- noted Estimated Creatinine Clearance: 21.4 mL/min (A) (based on SCr of 2.9 mg/dL (H)). according to latest data. Based on current GFR, CKD stage is end stage.  Monitor UOP and serial BMP and adjust therapy as needed. Renally dose meds. Avoid nephrotoxic medications and procedures.    Nephro consult  Has been getting HD MF via right IJ PermCath  Needs f/u with Vascular Surgery for IJ removal  Vascular Surgery consulted for problem cannulating HD access - Said OK to cannulate    Pressure injury of right buttock, stage 3  Wound Care      Anemia in ESRD (end-stage renal disease)  Anemia is likely due to chronic disease due to ESRD. Most recent hemoglobin and hematocrit are listed below.  Recent Labs     01/01/25  0419 01/02/25  0526 01/03/25  0554   HGB 8.7* 8.5* 9.3*   HCT 27.8* 26.2* 30.4*       Plan  - Monitor serial CBC: Daily  - Transfuse PRBC if patient becomes hemodynamically unstable, symptomatic or H/H drops below 7/21.  - Patient has not received any PRBC transfusions to date  - Patient's anemia is currently dropping, no source of bleeding possibly a lab  error, monitor for now    Unspecified atrial fibrillation  Patient has paroxysmal (<7 days) atrial fibrillation. Patient is currently in sinus rhythm. [id number] Score: 3. The patients heart rate in the last 24 hours is as follows:  Pulse  Min: 69  Max: 81     Antiarrhythmics       Anticoagulants  apixaban tablet 5 mg, 2 times daily, Oral  heparin (porcine) injection 1,000 Units, As needed (PRN), Intra-Catheter    Plan  - Replete lytes with a goal of K>4, Mg >2  - Patient is anticoagulated, see medications listed above.  - Patient's afib is currently controlled  - Previously had afib, and on Eliquis once a day because of bruising?  As she is being monitored will put on appropriate BID dosing    Type 2 diabetes mellitus treated with insulin  HbA1c 6.2, sugars controlled  Home DM regimen:  Largine 15 qHS  Hold scheduled insulin for now with AMS and decreased oral intake  SSI with POCT accuchecks to achieve blood glucose of 140-180 while hospitalized  Hypoglycemia protocol  Diabetic diet        Prophylactic use of anastrozole (Arimidex)  On Arimidex for history of breast cancer      Obesity, diabetes, and hypertension syndrome  Noted    Chronic pain  2/2 lumbar spondylosis  See above      Lumbar spondylosis  Chronic issue  Continue Cymbalta  Continue Robaxin TID PRN  Continue Norco prn      Class 1 obesity due to excess calories with serious comorbidity in adult  Body mass index is 32.54 kg/m². Morbid obesity complicates all aspects of disease management from diagnostic modalities to treatment. Weight loss encouraged and health benefits explained to patient.         Major depressive disorder, recurrent, moderate  Patient has recurrent depression which is moderate and is currently controlled. Will Continue anti-depressant medications. We will not consult psychiatry at this time. Patient does not display psychosis at this time. Continue to monitor closely and adjust plan of care as needed.      Chronic and  stable  Continue Cymbalta      Neurogenic bladder  Chronic issue  Has suprapubic cath  Continue Oxybutynin      Hyponatremia  Hyponatremia is likely due to volume overload from ESRD. The patient's most recent sodium results are listed below.  Recent Labs     01/01/25  0419 01/02/25  0526 01/03/25  0554   * 130* 127*       Plan  - Correct the sodium by 4-6mEq in 24 hours.   - Will treat the hyponatremia with HD  - Monitor sodium Daily.   - Patient hyponatremia is stable    Hyperlipidemia  Chronic and stable  Continue Lipitor    Hypothyroidism  Chronic and stable  Continue Synthroid  TSH 1.631        VTE Risk Mitigation (From admission, onward)           Ordered     heparin (porcine) injection 1,000 Units  As needed (PRN)         01/01/25 1610     apixaban tablet 5 mg  2 times daily         12/31/24 1251                    Discharge Planning   FLORENTINO: 1/5/2025     Code Status: Full Code   Medical Readiness for Discharge Date:                            Fariha Abraham MD  Department of Hospital Medicine   [facility] - Internal Medicine Telemetry

## 2025-01-03 NOTE — PROGRESS NOTES
AAOx4. POC reviewed, No acute events on shift  Admit Dx:Severe Sepsis  Afebrile. No acute events on shift. No deficits noted.  IV access & IVF: I  RIJ permacath site clean dry intact placed DMITRY 20 IPIV loss this  am Picc teamabout 5pm. Spoke to Adithya Ph-D in Inpatient pharmacy and requested time for Daptomycin be shifted  but per Adithya since zosyn running will need to call to request Dapto be shift once zosyns done. Will notify night shift   BM: 1/1/25  : Suprapubic Catheter site open to air .  Site dry intact, scant yellow drainage.  Bed alarm set, fall precautions maintained.  Bed locked, in lowest position, side rails up X2, call light w/in reach, environment clear of obstruction, personal items @ bedside.  Encouraged pt to use call light/call RN with any concerns.  Safety measures maintained

## 2025-01-04 PROBLEM — G92.9 TOXIC ENCEPHALOPATHY: Status: RESOLVED | Noted: 2024-12-31 | Resolved: 2025-01-04

## 2025-01-04 LAB
ALBUMIN SERPL BCP-MCNC: 2.3 G/DL (ref 3.5–5.2)
ANION GAP SERPL CALC-SCNC: 13 MMOL/L (ref 8–16)
BUN SERPL-MCNC: 46 MG/DL (ref 8–23)
CALCIUM SERPL-MCNC: 7.7 MG/DL (ref 8.7–10.5)
CHLORIDE SERPL-SCNC: 94 MMOL/L (ref 95–110)
CO2 SERPL-SCNC: 18 MMOL/L (ref 23–29)
CREAT SERPL-MCNC: 3.2 MG/DL (ref 0.5–1.4)
EST. GFR  (NO RACE VARIABLE): 14.8 ML/MIN/1.73 M^2
GLUCOSE SERPL-MCNC: 150 MG/DL (ref 70–110)
PHOSPHATE SERPL-MCNC: 5.8 MG/DL (ref 2.7–4.5)
POCT GLUCOSE: 142 MG/DL (ref 70–110)
POCT GLUCOSE: 191 MG/DL (ref 70–110)
POCT GLUCOSE: 219 MG/DL (ref 70–110)
POCT GLUCOSE: 229 MG/DL (ref 70–110)
POTASSIUM SERPL-SCNC: 4.2 MMOL/L (ref 3.5–5.1)
SODIUM SERPL-SCNC: 125 MMOL/L (ref 136–145)

## 2025-01-04 PROCEDURE — 63600175 PHARM REV CODE 636 W HCPCS: Mod: HCNC

## 2025-01-04 PROCEDURE — 27000207 HC ISOLATION: Mod: HCNC

## 2025-01-04 PROCEDURE — 25000003 PHARM REV CODE 250: Mod: HCNC

## 2025-01-04 PROCEDURE — 80069 RENAL FUNCTION PANEL: CPT | Mod: HCNC

## 2025-01-04 PROCEDURE — 63600175 PHARM REV CODE 636 W HCPCS: Mod: HCNC | Performed by: HOSPITALIST

## 2025-01-04 PROCEDURE — 99232 SBSQ HOSP IP/OBS MODERATE 35: CPT | Mod: HCNC,,, | Performed by: INTERNAL MEDICINE

## 2025-01-04 PROCEDURE — 90935 HEMODIALYSIS ONE EVALUATION: CPT | Mod: HCNC,,,

## 2025-01-04 PROCEDURE — 25000003 PHARM REV CODE 250: Mod: HCNC | Performed by: HOSPITALIST

## 2025-01-04 PROCEDURE — 21400001 HC TELEMETRY ROOM: Mod: HCNC

## 2025-01-04 RX ORDER — AMLODIPINE BESYLATE 10 MG/1
10 TABLET ORAL DAILY
Status: DISCONTINUED | OUTPATIENT
Start: 2025-01-04 | End: 2025-01-11 | Stop reason: HOSPADM

## 2025-01-04 RX ORDER — VANCOMYCIN 2 GRAM/500 ML IN 0.9 % SODIUM CHLORIDE INTRAVENOUS
20 ONCE
Status: DISCONTINUED | OUTPATIENT
Start: 2025-01-04 | End: 2025-01-04

## 2025-01-04 RX ADMIN — LEVOTHYROXINE SODIUM 50 MCG: 0.05 TABLET ORAL at 05:01

## 2025-01-04 RX ADMIN — ONDANSETRON 8 MG: 2 INJECTION INTRAMUSCULAR; INTRAVENOUS at 04:01

## 2025-01-04 RX ADMIN — DAPTOMYCIN 970 MG: 350 INJECTION, POWDER, LYOPHILIZED, FOR SOLUTION INTRAVENOUS at 02:01

## 2025-01-04 RX ADMIN — APIXABAN 5 MG: 5 TABLET, FILM COATED ORAL at 08:01

## 2025-01-04 RX ADMIN — CARVEDILOL 12.5 MG: 12.5 TABLET, FILM COATED ORAL at 11:01

## 2025-01-04 RX ADMIN — GENTAMICIN SULFATE 386 MG: 40 INJECTION, SOLUTION INTRAMUSCULAR; INTRAVENOUS at 06:01

## 2025-01-04 RX ADMIN — PIPERACILLIN SODIUM AND TAZOBACTAM SODIUM 4.5 G: 4; .5 INJECTION, POWDER, LYOPHILIZED, FOR SOLUTION INTRAVENOUS at 05:01

## 2025-01-04 RX ADMIN — HYDROCODONE BITARTRATE AND ACETAMINOPHEN 1 TABLET: 10; 325 TABLET ORAL at 09:01

## 2025-01-04 RX ADMIN — CARVEDILOL 12.5 MG: 12.5 TABLET, FILM COATED ORAL at 08:01

## 2025-01-04 RX ADMIN — HYDROCODONE BITARTRATE AND ACETAMINOPHEN 1 TABLET: 10; 325 TABLET ORAL at 07:01

## 2025-01-04 RX ADMIN — HEPARIN SODIUM 1000 UNITS: 1000 INJECTION, SOLUTION INTRAVENOUS; SUBCUTANEOUS at 11:01

## 2025-01-04 RX ADMIN — AMLODIPINE BESYLATE 10 MG: 10 TABLET ORAL at 01:01

## 2025-01-04 RX ADMIN — MUPIROCIN: 20 OINTMENT TOPICAL at 08:01

## 2025-01-04 RX ADMIN — VANCOMYCIN HYDROCHLORIDE 1250 MG: 1.25 INJECTION, POWDER, LYOPHILIZED, FOR SOLUTION INTRAVENOUS at 11:01

## 2025-01-04 RX ADMIN — IPRATROPIUM BROMIDE 2 SPRAY: 42 SPRAY, METERED NASAL at 08:01

## 2025-01-04 RX ADMIN — HYDROCODONE BITARTRATE AND ACETAMINOPHEN 1 TABLET: 10; 325 TABLET ORAL at 02:01

## 2025-01-04 RX ADMIN — TRAZODONE HYDROCHLORIDE 100 MG: 100 TABLET ORAL at 08:01

## 2025-01-04 RX ADMIN — LOSARTAN POTASSIUM 100 MG: 50 TABLET, FILM COATED ORAL at 11:01

## 2025-01-04 NOTE — PROGRESS NOTES
01/04/25 0827 01/04/25 0830 01/04/25 0833        Hemodialysis Catheter 08/08/23 0827 right subclavian   Placement Date/Time: 08/08/23 0827   Present Prior to Hospital Arrival?: No  Hand Hygiene: Performed  Barrier Precautions: Performed  Skin Antisepsis: ChloraPrep  Hemodialysis Catheter Type: Tunneled catheter  Location: right subclavian  :...   Site Assessment  --  No drainage  --    Line Securement Device  --  Secured with sutureless device  --    Dressing Type  --  CHG impregnated dressing/sponge  --    Dressing Status  --  Clean;Dry;Intact  --    Dressing Intervention  --  Integrity maintained  --    Date on Dressing  --  01/01/25  --    Dressing Due to be Changed  --  01/08/25  --    Venous Patency/Care  --  accessed;blood return present;flushed w/o difficulty  --    Arterial Patency/Care  --  heparin locked  (not accssed)  --         Hemodialysis AV Fistula 02/14/24 1034 Left forearm   Placement Date/Time: 02/14/24 1034   Present Prior to Hospital Arrival?: Yes  Hand Hygiene: Performed  Barrier Precautions: Performed  Skin Antisepsis: ChloraPrep  Location: Left forearm   Needle Size 16ga  --   --    Site Assessment Hematoma  --   --    Patency Present;Thrill;Bruit  --   --    Status Accessed  --   --    Flows Good  --   --    Site Condition Other (Comment)  (unable to cannulate venus needle, clots pulled, JEFFERY Herrera notified.)  --   --    During Hemodialysis Assessment   Blood Flow Rate (mL/min)  --   --  350 mL/min   Dialysate Flow Rate (mL/min)  --   --  700 ml/min   Ultrafiltration Rate (mL/Hr)  --   --  880 mL/Hr   Blood Pump Running  --   --  Yes   Arteriovenous Lines Secure  --   --  Yes   Arterial Pressure (mmHg)  --   --  -180 mmHg   Venous Pressure (mmHg)  --   --  110   Blood Volume Processed (Liters)  --   --  0 L   UF Removed (mL)  --   --  0 mL   TMP  --   --  40   Venous Line in Air Detector  --   --  Yes   Intake (mL)  --   --  250 mL   [place] Clip On  --   --  Yes    Transducer Dry  --   --  Yes   Access Visible  --   --  Yes    notified of access issue?  --   --  Yes   Heparin given?  --   --  N/A   Intra-Hemodialysis Comments  --   --  HD started, unable to cannulate x2, PA notified.     Patient arrived JORGE by stretcher. Cannulated arterial needle without difficulty, cannulated venus needle x 2, clots pulled, unable to use. accessed cvc venus port without difficulty, HD started using both AVF and CVC.

## 2025-01-04 NOTE — ASSESSMENT & PLAN NOTE
Severe sepsis 2/2 CAUTI and Enterococcus bacteremia  On arrival febrile to 101.7F, tachycardic 102 with WBC 21 and slow to respond (encephalopathy)  Suprapubic catheter changed in the ED  WBC down to 12, Procal 1.4  Urine cx with GNR and Proteus. Continue Zosyn:  Chart review shows history of Pseudomonas, as well as ESBL UTIs  Blood cx 12.31 with Enterococcus faecalis  Of note, does have a tunneled line, will need to determine if it needs to be removed vs treated through. Will currently keep given HD needs and will consider removal on 1/6 after fistulogram  Blood cx 1.2 NGTD  Continue Dapto for Enterococcus faecalis bacteremia, f/u ID recommendations

## 2025-01-04 NOTE — ASSESSMENT & PLAN NOTE
Patient's blood pressure range in the last 24 hours was: BP  Min: 148/76  Max: 184/74.The patient's inpatient anti-hypertensive regimen is listed below:  Current Antihypertensives  losartan tablet 100 mg, Daily, Oral  carvediloL tablet 12.5 mg, 2 times daily, Oral    Plan  - BP increasing since holding BP meds for sepsis  - continue Coreg and Losartan.  - restarting home norvasc

## 2025-01-04 NOTE — PROGRESS NOTES
[facility] - Internal [facility] Telemetry  Infectious Disease  Progress Note    Patient Name: Cecile Bowen  MRN: [id number]  Admission Date: 12/31/2024  Length of Stay: 4 days  Attending Physician: Cole Leggett MD  Primary Care Provider: Lurdes Navarro MD    Isolation Status: No active isolations  Assessment/Plan:      ID  * Severe sepsis  Ms. Bowen is a pleasant 73 yo woman with ESRD admitted with enterococcal bacteremia. The most likely source is her permacath. Her urine is growing Proteus.    Her blood is growing Enterococcus that is sensitive to vancomycin and ampicillin    Recommendations  Stop daptomycin for Enterococcus  Start vancomycin for Enterococcus - will continue as outpatient with dialysis  Remove permacath if transitioning to fistula (I do not see any reason to keep the catheter at this time)  Repeat blood cultures are negative so far    Will treat with vancomycin for 8 weeks from discharge, with dialysis. Dose adjustments to be determined by outpatient nephrology at the dialysis center.    Urine is growing 50,000 - 99,000 cfu/ml ESBL K.pneumoniae - unclear if this is a UTI or just colonization. Recommend one dose of gentamicin with dialysis. Consider suprapubic catheter exchange.      Thank you for your consult. I will sign off. Please contact us if you have any additional questions.    Manan Finch MD  Infectious Disease  [facility] - Internal [facility] Telemetry    Subjective:     Principal Problem:Severe sepsis    HPI: Ms. Bowen is a pleasant 74 y.o. woman with ESRD on HD,and chronic suprapubic catheter for neurogenic bladder who [place] [facility] [place] from Vascular Surgery for evaluation of fever and AMS.  She reports that she was well until she went to see her Vascular Surgeon for right IJ PermCath removal. She is currently dialyzing only Mondays and Fridays via right IJ PermCath.  However, upon arrival to clinic she was febrile to 100.9F, slow to respond and tachy to  104.  She was sent to the ER for further evaluation.  In the ED, she was febrile. Blood and urine studies were obtained and the patient was admitted with likely sepsis. Today, her blood cultures are growing GPC in chains, likely Enterococcus by BCID. ID is consulted for bacteremia. Today, the patient is clear and feeling better. She tells me that she usually notices a change in the color of her urine when she has a UTI and that her urine has been clear. No rigors today. Her Permacath remains in place.  Interval History: Afebrile    Review of Systems  Objective:     Vital Signs (Most Recent):  Temp: 98 °F (36.7 °C) (01/04/25 1208)  Pulse: 90 (01/04/25 1343)  Resp: 18 (01/04/25 1208)  BP: (!) 162/67 (01/04/25 1343)  SpO2: 98 % (01/04/25 0509) Vital Signs (24h Range):  Temp:  [97.5 °F (36.4 °C)-98.8 °F (37.1 °C)] 98 °F (36.7 °C)  Pulse:  [68-98] 90  Resp:  [18] 18  SpO2:  [78 %-98 %] 98 %  BP: (132-184)/(57-91) 162/67     Weight: 97.1 kg (214 lb)  Body mass index is 32.54 kg/m².    Estimated Creatinine Clearance: 19.4 mL/min (A) (based on SCr of 3.2 mg/dL (H)).     Physical Exam  Vitals and nursing note reviewed.          Significant Labs: Blood Culture:   Recent Labs   Lab 12/31/24  0937 01/02/25  0526 01/02/25  0529   LABBLOO Gram stain aer bottle: Gram positive cocci in chains resembling Strep  Gram stain zelda bottle: Gram positive cocci in chains resembling Strep  Results called to and read back by: Sean Hernandez RN  01/01/2025  10:31  ENTEROCOCCUS FAECALIS  For susceptibility see order #[id number]  *  Gram stain aer bottle: Gram positive cocci in chains resembling Strep  Gram stain zelda bottle: Gram positive cocci in chains resembling Strep  Results called to and read back by: Sean Hernandez RN  01/01/2025  10:31  ENTEROCOCCUS FAECALIS* No Growth to date  No Growth to date  No Growth to date No Growth to date  No Growth to date  No Growth to date     CBC:   Recent Labs   Lab 01/03/25  0554   WBC 7.51    HGB 9.3*   HCT 30.4*        CMP:   Recent Labs   Lab 01/03/25  0554 01/04/25  0839   * 125*   K 4.2 4.2   CL 95 94*   CO2 22* 18*   * 150*   BUN 33* 46*   CREATININE 2.9* 3.2*   CALCIUM 8.1* 7.7*   PROT 6.5  --    ALBUMIN 2.4* 2.3*   BILITOT 0.3  --    ALKPHOS 95  --    AST 17  --    ALT 11  --    ANIONGAP 10 13       Significant Imaging: None

## 2025-01-04 NOTE — ASSESSMENT & PLAN NOTE
Creatine stable for now. BMP reviewed- noted Estimated Creatinine Clearance: 19.4 mL/min (A) (based on SCr of 3.2 mg/dL (H)). according to latest data. Based on current GFR, CKD stage is end stage.  Monitor UOP and serial BMP and adjust therapy as needed. Renally dose meds. Avoid nephrotoxic medications and procedures.    Nephro consult  Has been getting HD MF via right IJ PermCath  Needs f/u with Vascular Surgery for IJ removal  Vascular Surgery consulted for problem cannulating HD access, plans for fistulogram on 1/6

## 2025-01-04 NOTE — PROGRESS NOTES
01/04/25 1201 01/04/25 1208        Hemodialysis Catheter 08/08/23 0827 right subclavian   Placement Date/Time: 08/08/23 0827   Present Prior to Hospital Arrival?: No  Hand Hygiene: Performed  Barrier Precautions: Performed  Skin Antisepsis: ChloraPrep  Hemodialysis Catheter Type: Tunneled catheter  Location: right subclavian  :...   Site Assessment  --  No drainage   Line Securement Device  --  Secured with sutureless device   Dressing Type  --  CHG impregnated dressing/sponge   Dressing Status  --  Clean;Dry;Intact   Dressing Intervention  --  Integrity maintained   Date on Dressing  --  01/01/25   Dressing Due to be Changed  --  01/08/25   Venous Patency/Care  --  deaccessed;flushed w/o difficulty;heparin locked   Arterial Patency/Care  --  deaccessed;flushed w/o difficulty;heparin locked        Hemodialysis AV Fistula 02/14/24 1034 Left forearm   Placement Date/Time: 02/14/24 1034   Present Prior to Hospital Arrival?: Yes  Hand Hygiene: Performed  Barrier Precautions: Performed  Skin Antisepsis: ChloraPrep  Location: Left forearm   Site Assessment  --  Clean;Dry;Intact   Patency  --  Present;Thrill;Bruit   Status  --  Deaccessed   Site Condition  --  No complications   During Hemodialysis Assessment   Blood Flow Rate (mL/min) 350 mL/min  --    Dialysate Flow Rate (mL/min) 700 ml/min  --    Ultrafiltration Rate (mL/Hr) 1190 mL/Hr  --    Blood Pump Running Yes  --    Arteriovenous Lines Secure Yes  --    Arterial Pressure (mmHg) -180 mmHg  --    Venous Pressure (mmHg) 110  --    Blood Volume Processed (Liters) 68.7 L  --    UF Removed (mL) 3650 mL  --    TMP 40  --    Venous Line in Air Detector Yes  --    Intake (mL) 250 mL  --    [place] Clip On Yes  --    Transducer Dry Yes  --    Access Visible Yes  --     notified of access issue? N/A  --    Heparin given? N/A  --    Intra-Hemodialysis Comments HD completed  --    Post-Hemodialysis Assessment   Rinseback Volume (mL) 250 mL  --    Blood  Digital Medicine: Health  Introduction    Introduced Marvin Chacon to Digital Medicine. Discussed health  role and recommended lifestyle modifications.    The history is provided by the patient.           Additional Enrollment Details: Patient stated that he was feeling a little anxious and a bit annoyed while taking his readings yesterday, and believes that is why his readings were a bit elevated. The patient and I reviewed the proper BP technique. Also encouraged the patient to wait at least 1 hour after medication/exercise to take a reading, and 30 minutes after food/caffeine consumption. The patient was receptive.     Informed patient that he should not have to manually input any of his readings, and that they should automatically come through to us. The patient was receptive.     HYPERTENSION  Explained hypertension digital medicine goals including BP goal less than or equal to 130/80mmHg, improved convenience of BP management and reduced risk of heart attack, kidney failure, stroke, eye disease, dementia, and death.      Explained non-pharmacologic therapies like low salt diet and physical activity can reduce blood pressure. .      Explained that we expect patient to submit several blood pressure readings per week at random times of the day, but at least 30 minutes after taking blood pressure medications. Instructed patient not to allow anyone else to use their blood pressure monitor and phone as data submitted is directly entered into medical record. Reviewed and confirmed appropriate blood pressure monitoring technique.         Explained to the patient that the Digital Medicine team is not available for emergencies. Advised patient call [facility] On [facility] ([phone] or [phone]) or [phone] if needed.                 Diet-Not assessed          Physical Activity-Not assessed    Medication Adherence-Medication adherence was assessed.  Patient continue taking medication as  Volume Processed (Liters) 68.7 L  --    Dialyzer Clearance Moderately streaked  --    Duration of Treatment 210 minutes  --    Total UF (mL) 3650 mL  --    Net Fluid Removal 3000  --    Patient Response to Treatment Catalina. well  --      HD completed. Patient left JORGE by ciarra in [facility].   prescribed.          Patient stated that for the last 6 weeks or so, he has been taking irbesartan at night and metoprolol during the day. He takes metoprolol once in the morning and once in the evening.       Additional monitoring needed.  Provided patient education.  Reviewed Device Techniques.     Addressed patient questions and patient has my contact information if needed prior to next outreach. Patient verbalizes understanding.      Explained the importance of self-monitoring and medication adherence. Encouraged the patient to communicate with their health  for lifestyle modifications to help improve or maintain a healthy lifestyle.        Explained to the patient that the Digital Medicine team is not available for emergencies. Advised patient call [facility] ([phone] or [phone]) or [phone] if needed.            There are no preventive care reminders to display for this patient.      Last 5 Patient Entered Readings                                      Current 30 Day Average: 161/85     Recent Readings 10/20/2020 10/20/2020 10/20/2020 10/20/2020 10/20/2020    SBP (mmHg) - 161 161  - 165    DBP (mmHg) - 85 85 - 85    Pulse 63 63 - 66 66

## 2025-01-04 NOTE — CARE UPDATE
Vascular Surgery Note:     Will plan for LUE fistulagram w/ possible intervention on Monday in Cath lab @ 9AM due to ongoing dialysis access difficulties.     Prashant PGY6  Vascular Surgery Fellow

## 2025-01-04 NOTE — SUBJECTIVE & OBJECTIVE
Interval History: Afebrile    Review of Systems  Objective:     Vital Signs (Most Recent):  Temp: 98 °F (36.7 °C) (01/04/25 1208)  Pulse: 90 (01/04/25 1343)  Resp: 18 (01/04/25 1208)  BP: (!) 162/67 (01/04/25 1343)  SpO2: 98 % (01/04/25 0509) Vital Signs (24h Range):  Temp:  [97.5 °F (36.4 °C)-98.8 °F (37.1 °C)] 98 °F (36.7 °C)  Pulse:  [68-98] 90  Resp:  [18] 18  SpO2:  [78 %-98 %] 98 %  BP: (132-184)/(57-91) 162/67     Weight: 97.1 kg (214 lb)  Body mass index is 32.54 kg/m².    Estimated Creatinine Clearance: 19.4 mL/min (A) (based on SCr of 3.2 mg/dL (H)).     Physical Exam  Vitals and nursing note reviewed.          Significant Labs: Blood Culture:   Recent Labs   Lab 12/31/24  0937 01/02/25  0526 01/02/25  0529   LABBLOO Gram stain aer bottle: Gram positive cocci in chains resembling Strep  Gram stain zelda bottle: Gram positive cocci in chains resembling Strep  Results called to and read back by: Sean Hernandez RN  01/01/2025  10:31  ENTEROCOCCUS FAECALIS  For susceptibility see order #[id number]  *  Gram stain aer bottle: Gram positive cocci in chains resembling Strep  Gram stain zelda bottle: Gram positive cocci in chains resembling Strep  Results called to and read back by: Sean Hernandez RN  01/01/2025  10:31  ENTEROCOCCUS FAECALIS* No Growth to date  No Growth to date  No Growth to date No Growth to date  No Growth to date  No Growth to date     CBC:   Recent Labs   Lab 01/03/25  0554   WBC 7.51   HGB 9.3*   HCT 30.4*        CMP:   Recent Labs   Lab 01/03/25  0554 01/04/25  0839   * 125*   K 4.2 4.2   CL 95 94*   CO2 22* 18*   * 150*   BUN 33* 46*   CREATININE 2.9* 3.2*   CALCIUM 8.1* 7.7*   PROT 6.5  --    ALBUMIN 2.4* 2.3*   BILITOT 0.3  --    ALKPHOS 95  --    AST 17  --    ALT 11  --    ANIONGAP 10 13       Significant Imaging: None

## 2025-01-04 NOTE — SUBJECTIVE & OBJECTIVE
Interval History: NAEON and VSS.     Review of Systems   Constitutional:  Negative for chills and fever.   HENT:  Negative for ear pain and sinus pain.    Eyes:  Negative for pain.   Respiratory:  Negative for cough and shortness of breath.    Cardiovascular:  Negative for chest pain and leg swelling.   Gastrointestinal:  Negative for abdominal pain, constipation, diarrhea, nausea, rectal pain and vomiting.   Genitourinary:  Negative for dysuria and flank pain.   Musculoskeletal:  Negative for arthralgias, back pain, joint swelling, myalgias and neck pain.   Neurological:  Negative for weakness, numbness and headaches.   Psychiatric/Behavioral:  Negative for agitation, dysphoric mood and sleep disturbance. The patient is not nervous/anxious.      Objective:     Vital Signs (Most Recent):  Temp: 98 °F (36.7 °C) (01/04/25 0817)  Pulse: 80 (01/04/25 1015)  Resp: 18 (01/04/25 0955)  BP: (!) 173/75 (01/04/25 1015)  SpO2: 98 % (01/04/25 0509) Vital Signs (24h Range):  Temp:  [97.5 °F (36.4 °C)-98.8 °F (37.1 °C)] 98 °F (36.7 °C)  Pulse:  [68-98] 80  Resp:  [18] 18  SpO2:  [78 %-99 %] 98 %  BP: (148-184)/(65-91) 173/75     Weight: 97.1 kg (214 lb)  Body mass index is 32.54 kg/m².    Intake/Output Summary (Last 24 hours) at 1/4/2025 1101  Last data filed at 1/4/2025 0542  Gross per 24 hour   Intake --   Output 825 ml   Net -825 ml      Physical Exam  Constitutional:       General: She is not in acute distress.     Appearance: Normal appearance. She is obese. She is not ill-appearing, toxic-appearing or diaphoretic.   HENT:      Head: Normocephalic and atraumatic.      Right Ear: External ear normal.      Left Ear: External ear normal.      Nose: Nose normal.      Mouth/Throat:      Mouth: Mucous membranes are moist.   Eyes:      General: No scleral icterus.        Right eye: No discharge.         Left eye: No discharge.      Extraocular Movements: Extraocular movements intact.   Cardiovascular:      Rate and Rhythm: Normal  "rate.      Heart sounds: Normal heart sounds. No murmur heard.     No friction rub.   Pulmonary:      Effort: Pulmonary effort is normal. No respiratory distress.      Breath sounds: No stridor. Rales present. No wheezing or rhonchi.   Abdominal:      General: Abdomen is flat. Bowel sounds are normal. There is distension.   Musculoskeletal:         General: No swelling.      Cervical back: Normal range of motion.      Right lower leg: Edema present.      Left lower leg: Edema present.   Skin:     General: Skin is warm and dry.      Capillary Refill: Capillary refill takes less than 2 seconds.      Coloration: Skin is not jaundiced or pale.      Findings: No bruising.   Neurological:      General: No focal deficit present.      Mental Status: She is alert and oriented to person, place, and time.      Motor: No weakness.   Psychiatric:         Mood and Affect: Mood normal.         Behavior: Behavior normal.         Thought Content: Thought content normal.         MELD 3.0: 25 at 1/2/2025  5:26 AM  MELD-Na: 25 at 1/2/2025  5:26 AM  Calculated from:  Serum Creatinine: On dialysis. Using the maximum value.  Serum Sodium: 130 mmol/L at 1/2/2025  5:26 AM  Total Bilirubin: 0.2 mg/dL (Using min of 1 mg/dL) at 1/2/2025  5:26 AM  Serum Albumin: 2.3 g/dL at 1/2/2025  5:26 AM  INR(ratio): 1 at 12/31/2024  9:37 AM  Age at listing (hypothetical): 74 years  Sex: Female at 1/2/2025  5:26 AM      Significant Labs:  CBC:  Recent Labs   Lab 01/03/25  0554   WBC 7.51   HGB 9.3*   HCT 30.4*        CMP:  Recent Labs   Lab 01/03/25  0554 01/04/25  0839   * 125*   K 4.2 4.2   CL 95 94*   CO2 22* 18*   * 150*   BUN 33* 46*   CREATININE 2.9* 3.2*   CALCIUM 8.1* 7.7*   PROT 6.5  --    ALBUMIN 2.4* 2.3*   BILITOT 0.3  --    ALKPHOS 95  --    AST 17  --    ALT 11  --    ANIONGAP 10 13     PTINR:  No results for input(s): "INR" in the last 48 hours.    Significant Procedures:   Dobutamine Stress Test with Color Flow: No " results found. However, due to the size of the patient record, not all encounters were searched. Please check Results Review for a complete set of results.    None

## 2025-01-04 NOTE — PROGRESS NOTES
[facility] HEMODIALYSIS NOTE     Patient currently on hemodialysis for removal of uremic toxins and volume.     Patient seen and evaluated on hemodialysis, tolerating treatment, see HD flowsheet for vitals and assessments.     Labs have been reviewed and the dialysate bath has been adjusted.        Assessment/Plan:     -Patient ESRD, seen on HD, tolerating treatment well, w/o complaints   -UF goal of 3 L, 3.5 hours as tolerated  -Na 125, Na bath adjusted, likely secondary to hypervolemia, UF with HD  -May d/c sodium bicarb tablets, bicarb bath with HD   -Primary planning for removal of TDC due to bacteremia, still having difficulties cannulating AVF, vascular planning for fistulogram on Monday  -1 L daily fluid restrictions, low K diet   -Renal diet, if not NPO   -Strict I/O's and daily weights  -Daily renal function panels  -Keep MAP >65 while on HD   -Hgb goal 10-11, hgb near goal, continue Epo  -Phos 5.8, not currently on binders, within goal  -Will continue to follow while inpatient      Jordyn Herrera PA-C  Nephrology

## 2025-01-04 NOTE — ASSESSMENT & PLAN NOTE
Ms. Bowen is a pleasant 73 yo woman with ESRD admitted with enterococcal bacteremia. The most likely source is her permacath. Her urine is growing Proteus.    Her blood is growing Enterococcus that is sensitive to vancomycin and ampicillin    Recommendations  Stop daptomycin for Enterococcus  Start vancomycin for Enterococcus - will continue as outpatient with dialysis  Remove permacath if transitioning to fistula (I do not see any reason to keep the catheter at this time)  Repeat blood cultures are negative so far    Will treat with vancomycin for 8 weeks from discharge, with dialysis. Dose adjustments to be determined by outpatient nephrology at the dialysis center.

## 2025-01-04 NOTE — PROGRESS NOTES
[facility] - Internal Medicine [facility] Medicine  Progress Note    Patient Name: Cecile Bowen  MRN: [id number]  Patient Class: IP- Inpatient   Admission Date: 12/31/2024  Length of Stay: 4 days  Attending Physician: Cole Leggett MD  Primary Care Provider: Lurdes Navarro MD        Subjective     Principal Problem:Severe sepsis        HPI:  Ms. Cecile Bowen is a 74 y.o. female with ESRD on HD, T2DM, afib on Eliquis, HTN, obesity, history of breast cancer, chronic suprapubic catheter for neurogenic bladder and chronic back pain who presented to the [facility] ED 12/31 from Vascular Surgery for evaluation of fever and AMS.  History is obtained from patient.  She reports that over the last few days, she has been having fever and lethargy.  She went to see her Vascular Surgeon for right IJ PermCath removal, as her fistula has matured.  She is currently dialyzing only Mondays and Fridays via right IJ PermCath.  However, upon arrival to clinic she was febrile to 100.9F, slow to respond and tachy to 104.  She was sent to the ER for further evaluation.  At baseline, she uses a wheelchair.  She lives with her sister.  She currently endorses back pain that is not escalated from normal.  She is full code.  She took all of her medications prior to going to clinic except her Eliquis.    Upon arrival to the ER, vitals were temp 101.7F, , /77.  Labs showed WBC 20, Hgb 11.3, Sodium 129, BUN/Cr 28/3.2, Lactic 0.8.  UA was dirty.  Suprapubic catheter was exchanged in the ED.  CXR was negative.  She was given Vanc and Zosyn was admitted to Hospital Medicine for further management.    Overview/Hospital Course:  Ms. Bowen was admitted to Hospital Medicine for management of severe sepsis 2/2 Enterococcus bacteremia and a suprapubic UTI.  She was started on Zosyn.  After her blood cx returned positive, ID was consulted and she was started on Dapto for Enterococcus. Urine cx returned with ESBL Klebsiella and  Proteus. Had issues with AVF for HD during stay. Vascular surgery was consulted with plans for fistulogram on 1/6.     Interval History: NAEON and VSS.     Review of Systems   Constitutional:  Negative for chills and fever.   HENT:  Negative for ear pain and sinus pain.    Eyes:  Negative for pain.   Respiratory:  Negative for cough and shortness of breath.    Cardiovascular:  Negative for chest pain and leg swelling.   Gastrointestinal:  Negative for abdominal pain, constipation, diarrhea, nausea, rectal pain and vomiting.   Genitourinary:  Negative for dysuria and flank pain.   Musculoskeletal:  Negative for arthralgias, back pain, joint swelling, myalgias and neck pain.   Neurological:  Negative for weakness, numbness and headaches.   Psychiatric/Behavioral:  Negative for agitation, dysphoric mood and sleep disturbance. The patient is not nervous/anxious.      Objective:     Vital Signs (Most Recent):  Temp: 98 °F (36.7 °C) (01/04/25 0817)  Pulse: 80 (01/04/25 1015)  Resp: 18 (01/04/25 0955)  BP: (!) 173/75 (01/04/25 1015)  SpO2: 98 % (01/04/25 0509) Vital Signs (24h Range):  Temp:  [97.5 °F (36.4 °C)-98.8 °F (37.1 °C)] 98 °F (36.7 °C)  Pulse:  [68-98] 80  Resp:  [18] 18  SpO2:  [78 %-99 %] 98 %  BP: (148-184)/(65-91) 173/75     Weight: 97.1 kg (214 lb)  Body mass index is 32.54 kg/m².    Intake/Output Summary (Last 24 hours) at 1/4/2025 1101  Last data filed at 1/4/2025 0542  Gross per 24 hour   Intake --   Output 825 ml   Net -825 ml      Physical Exam  Constitutional:       General: She is not in acute distress.     Appearance: Normal appearance. She is obese. She is not ill-appearing, toxic-appearing or diaphoretic.   HENT:      Head: Normocephalic and atraumatic.      Right Ear: External ear normal.      Left Ear: External ear normal.      Nose: Nose normal.      Mouth/Throat:      Mouth: Mucous membranes are moist.   Eyes:      General: No scleral icterus.        Right eye: No discharge.         Left eye:  No discharge.      Extraocular Movements: Extraocular movements intact.   Cardiovascular:      Rate and Rhythm: Normal rate.      Heart sounds: Normal heart sounds. No murmur heard.     No friction rub.   Pulmonary:      Effort: Pulmonary effort is normal. No respiratory distress.      Breath sounds: No stridor. Rales present. No wheezing or rhonchi.   Abdominal:      General: Abdomen is flat. Bowel sounds are normal. There is distension.   Musculoskeletal:         General: No swelling.      Cervical back: Normal range of motion.      Right lower leg: Edema present.      Left lower leg: Edema present.   Skin:     General: Skin is warm and dry.      Capillary Refill: Capillary refill takes less than 2 seconds.      Coloration: Skin is not jaundiced or pale.      Findings: No bruising.   Neurological:      General: No focal deficit present.      Mental Status: She is alert and oriented to person, place, and time.      Motor: No weakness.   Psychiatric:         Mood and Affect: Mood normal.         Behavior: Behavior normal.         Thought Content: Thought content normal.         MELD 3.0: 25 at 1/2/2025  5:26 AM  MELD-Na: 25 at 1/2/2025  5:26 AM  Calculated from:  Serum Creatinine: On dialysis. Using the maximum value.  Serum Sodium: 130 mmol/L at 1/2/2025  5:26 AM  Total Bilirubin: 0.2 mg/dL (Using min of 1 mg/dL) at 1/2/2025  5:26 AM  Serum Albumin: 2.3 g/dL at 1/2/2025  5:26 AM  INR(ratio): 1 at 12/31/2024  9:37 AM  Age at listing (hypothetical): 74 years  Sex: Female at 1/2/2025  5:26 AM      Significant Labs:  CBC:  Recent Labs   Lab 01/03/25  0554   WBC 7.51   HGB 9.3*   HCT 30.4*        CMP:  Recent Labs   Lab 01/03/25  0554 01/04/25  0839   * 125*   K 4.2 4.2   CL 95 94*   CO2 22* 18*   * 150*   BUN 33* 46*   CREATININE 2.9* 3.2*   CALCIUM 8.1* 7.7*   PROT 6.5  --    ALBUMIN 2.4* 2.3*   BILITOT 0.3  --    ALKPHOS 95  --    AST 17  --    ALT 11  --    ANIONGAP 10 13     PTINR:  No results  "for input(s): "INR" in the last 48 hours.    Significant Procedures:   Dobutamine Stress Test with Color Flow: No results found. However, due to the size of the patient record, not all encounters were searched. Please check Results Review for a complete set of results.    None    Assessment and Plan     * Severe sepsis  Severe sepsis 2/2 CAUTI and Enterococcus bacteremia  On arrival febrile to 101.7F, tachycardic 102 with WBC 21 and slow to respond (encephalopathy)  Suprapubic catheter changed in the ED  WBC down to 12, Procal 1.4  Urine cx with GNR and Proteus. Continue Zosyn:  Chart review shows history of Pseudomonas, as well as ESBL UTIs  Blood cx 12.31 with Enterococcus faecalis  Of note, does have a tunneled line, will need to determine if it needs to be removed vs treated through. Will currently keep given HD needs and will consider removal on 1/6 after fistulogram  Blood cx 1.2 NGTD  Continue Dapto for Enterococcus faecalis bacteremia, f/u ID recommendations    ESRD (end stage renal disease) on dialysis  Creatine stable for now. BMP reviewed- noted Estimated Creatinine Clearance: 19.4 mL/min (A) (based on SCr of 3.2 mg/dL (H)). according to latest data. Based on current GFR, CKD stage is end stage.  Monitor UOP and serial BMP and adjust therapy as needed. Renally dose meds. Avoid nephrotoxic medications and procedures.    Nephro consult  Has been getting HD MF via right IJ PermCath  Needs f/u with Vascular Surgery for IJ removal  Vascular Surgery consulted for problem cannulating HD access, plans for fistulogram on 1/6    HTN (hypertension)  Patient's blood pressure range in the last 24 hours was: BP  Min: 148/76  Max: 184/74.The patient's inpatient anti-hypertensive regimen is listed below:  Current Antihypertensives  losartan tablet 100 mg, Daily, Oral  carvediloL tablet 12.5 mg, 2 times daily, Oral    Plan  - BP increasing since holding BP meds for sepsis  - continue Coreg and Losartan.  - restarting home " norvasc    Positive JASON (antinuclear antibody)  History noted      Personal history of malignant neoplasm of breast  History noted  Continue Arimidex      Pressure injury of right buttock, stage 3  Wound Care      Anemia in ESRD (end-stage renal disease)  Anemia is likely due to chronic disease due to ESRD. Most recent hemoglobin and hematocrit are listed below.  Recent Labs     01/01/25  0419 01/02/25  0526 01/03/25  0554   HGB 8.7* 8.5* 9.3*   HCT 27.8* 26.2* 30.4*       Plan  - Monitor serial CBC: Daily  - Transfuse PRBC if patient becomes hemodynamically unstable, symptomatic or H/H drops below 7/21.  - Patient has not received any PRBC transfusions to date  - Patient's anemia is currently dropping, no source of bleeding possibly a lab error, monitor for now    Unspecified atrial fibrillation  Patient has paroxysmal (<7 days) atrial fibrillation. Patient is currently in sinus rhythm. [id number] Score: 3. The patients heart rate in the last 24 hours is as follows:  Pulse  Min: 69  Max: 81     Antiarrhythmics       Anticoagulants  apixaban tablet 5 mg, 2 times daily, Oral  heparin (porcine) injection 1,000 Units, As needed (PRN), Intra-Catheter    Plan  - Replete lytes with a goal of K>4, Mg >2  - Patient is anticoagulated, see medications listed above.  - Patient's afib is currently controlled  - Previously had afib, and on Eliquis once a day because of bruising?  As she is being monitored will put on appropriate BID dosing    Bacteremia due to Enterococcus  See severe sepsis      Catheter-associated urinary tract infection  See severe sepsis      Type 2 diabetes mellitus treated with insulin  HbA1c 6.2, sugars controlled  Home DM regimen:  Largine 15 qHS  Hold scheduled insulin for now with AMS and decreased oral intake  SSI with POCT accuchecks to achieve blood glucose of 140-180 while hospitalized  Hypoglycemia protocol  Diabetic diet        Prophylactic use of anastrozole (Arimidex)  On Arimidex for history  of breast cancer      Obesity, diabetes, and hypertension syndrome  Noted    Chronic pain  2/2 lumbar spondylosis  See above      Lumbar spondylosis  Chronic issue  Continue Cymbalta  Continue Robaxin TID PRN  Continue Norco prn      Class 1 obesity due to excess calories with serious comorbidity in adult  Body mass index is 32.54 kg/m². Morbid obesity complicates all aspects of disease management from diagnostic modalities to treatment. Weight loss encouraged and health benefits explained to patient.         Major depressive disorder, recurrent, moderate  Patient has recurrent depression which is moderate and is currently controlled. Will Continue anti-depressant medications. We will not consult psychiatry at this time. Patient does not display psychosis at this time. Continue to monitor closely and adjust plan of care as needed.      Chronic and stable  Continue Cymbalta      Neurogenic bladder  Chronic issue  Has suprapubic cath  Continue Oxybutynin      Hyponatremia  Hyponatremia is likely due to volume overload from ESRD. The patient's most recent sodium results are listed below.  Recent Labs     01/01/25  0419 01/02/25  0526 01/03/25  0554   * 130* 127*       Plan  - Correct the sodium by 4-6mEq in 24 hours.   - Will treat the hyponatremia with HD  - Monitor sodium Daily.   - Patient hyponatremia is stable    Hyperlipidemia  Chronic and stable  Continue Lipitor    Hypothyroidism  Chronic and stable  Continue Synthroid  TSH 1.631        VTE Risk Mitigation (From admission, onward)           Ordered     heparin (porcine) injection 1,000 Units  As needed (PRN)         01/01/25 1610     apixaban tablet 5 mg  2 times daily         12/31/24 1251                    Discharge Planning   FLORENTINO: 1/5/2025     Code Status: Full Code   Medical Readiness for Discharge Date:   Discharge Plan A: Home Health                        Cole Leggett MD  Department of Hospital Medicine   [facility] - Internal Medicine  Telemetry

## 2025-01-05 LAB
BACTERIA UR CULT: ABNORMAL
BACTERIA UR CULT: ABNORMAL
ERYTHROCYTE [DISTWIDTH] IN BLOOD BY AUTOMATED COUNT: 12.9 % (ref 11.5–14.5)
HCT VFR BLD AUTO: 28.2 % (ref 37–48.5)
HGB BLD-MCNC: 9.1 G/DL (ref 12–16)
MCH RBC QN AUTO: 30.2 PG (ref 27–31)
MCHC RBC AUTO-ENTMCNC: 32.3 G/DL (ref 32–36)
MCV RBC AUTO: 94 FL (ref 82–98)
PLATELET # BLD AUTO: 229 K/UL (ref 150–450)
PMV BLD AUTO: 8.9 FL (ref 9.2–12.9)
POCT GLUCOSE: 160 MG/DL (ref 70–110)
POCT GLUCOSE: 186 MG/DL (ref 70–110)
POCT GLUCOSE: 189 MG/DL (ref 70–110)
POCT GLUCOSE: 241 MG/DL (ref 70–110)
RBC # BLD AUTO: 3.01 M/UL (ref 4–5.4)
WBC # BLD AUTO: 8.49 K/UL (ref 3.9–12.7)

## 2025-01-05 PROCEDURE — 25000003 PHARM REV CODE 250: Mod: HCNC

## 2025-01-05 PROCEDURE — 36415 COLL VENOUS BLD VENIPUNCTURE: CPT | Mod: HCNC

## 2025-01-05 PROCEDURE — 27000207 HC ISOLATION: Mod: HCNC

## 2025-01-05 PROCEDURE — 94761 N-INVAS EAR/PLS OXIMETRY MLT: CPT | Mod: HCNC

## 2025-01-05 PROCEDURE — 85027 COMPLETE CBC AUTOMATED: CPT | Mod: HCNC

## 2025-01-05 PROCEDURE — 21400001 HC TELEMETRY ROOM: Mod: HCNC

## 2025-01-05 PROCEDURE — 25000003 PHARM REV CODE 250: Mod: HCNC | Performed by: HOSPITALIST

## 2025-01-05 RX ORDER — METHOCARBAMOL 500 MG/1
500 TABLET, FILM COATED ORAL 4 TIMES DAILY
Status: COMPLETED | OUTPATIENT
Start: 2025-01-05 | End: 2025-01-05

## 2025-01-05 RX ADMIN — DULOXETINE HYDROCHLORIDE 40 MG: 20 CAPSULE, DELAYED RELEASE ORAL at 11:01

## 2025-01-05 RX ADMIN — MUPIROCIN: 20 OINTMENT TOPICAL at 10:01

## 2025-01-05 RX ADMIN — METHOCARBAMOL 500 MG: 500 TABLET ORAL at 08:01

## 2025-01-05 RX ADMIN — HYDROCODONE BITARTRATE AND ACETAMINOPHEN 1 TABLET: 10; 325 TABLET ORAL at 06:01

## 2025-01-05 RX ADMIN — METHOCARBAMOL 500 MG: 500 TABLET ORAL at 06:01

## 2025-01-05 RX ADMIN — CARVEDILOL 12.5 MG: 12.5 TABLET, FILM COATED ORAL at 08:01

## 2025-01-05 RX ADMIN — APIXABAN 5 MG: 5 TABLET, FILM COATED ORAL at 08:01

## 2025-01-05 RX ADMIN — HYDROCODONE BITARTRATE AND ACETAMINOPHEN 1 TABLET: 10; 325 TABLET ORAL at 11:01

## 2025-01-05 RX ADMIN — OXYBUTYNIN CHLORIDE 10 MG: 10 TABLET, EXTENDED RELEASE ORAL at 10:01

## 2025-01-05 RX ADMIN — IPRATROPIUM BROMIDE 2 SPRAY: 42 SPRAY, METERED NASAL at 10:01

## 2025-01-05 RX ADMIN — METHOCARBAMOL 500 MG: 500 TABLET ORAL at 10:01

## 2025-01-05 RX ADMIN — IPRATROPIUM BROMIDE 2 SPRAY: 42 SPRAY, METERED NASAL at 08:01

## 2025-01-05 RX ADMIN — TRAZODONE HYDROCHLORIDE 100 MG: 100 TABLET ORAL at 08:01

## 2025-01-05 RX ADMIN — SODIUM BICARBONATE 650 MG TABLET 650 MG: at 10:01

## 2025-01-05 RX ADMIN — ANASTROZOLE 1 MG: 1 TABLET, COATED ORAL at 10:01

## 2025-01-05 RX ADMIN — CARVEDILOL 12.5 MG: 12.5 TABLET, FILM COATED ORAL at 10:01

## 2025-01-05 RX ADMIN — MUPIROCIN: 20 OINTMENT TOPICAL at 08:01

## 2025-01-05 RX ADMIN — METHOCARBAMOL 500 MG: 500 TABLET ORAL at 01:01

## 2025-01-05 RX ADMIN — ATORVASTATIN CALCIUM 80 MG: 40 TABLET, FILM COATED ORAL at 10:01

## 2025-01-05 RX ADMIN — AMLODIPINE BESYLATE 10 MG: 10 TABLET ORAL at 10:01

## 2025-01-05 RX ADMIN — LEVOTHYROXINE SODIUM 50 MCG: 0.05 TABLET ORAL at 05:01

## 2025-01-05 RX ADMIN — APIXABAN 5 MG: 5 TABLET, FILM COATED ORAL at 10:01

## 2025-01-05 RX ADMIN — LOSARTAN POTASSIUM 100 MG: 50 TABLET, FILM COATED ORAL at 10:01

## 2025-01-05 NOTE — SUBJECTIVE & OBJECTIVE
Interval History: NAEON and VSS.     Review of Systems   Constitutional:  Negative for chills and fever.   HENT:  Negative for ear pain and sinus pain.    Eyes:  Negative for pain.   Respiratory:  Negative for cough and shortness of breath.    Cardiovascular:  Negative for chest pain and leg swelling.   Gastrointestinal:  Negative for abdominal pain, constipation, diarrhea, nausea, rectal pain and vomiting.   Genitourinary:  Negative for dysuria and flank pain.   Musculoskeletal:  Negative for arthralgias, back pain, joint swelling, myalgias and neck pain.   Neurological:  Negative for weakness, numbness and headaches.   Psychiatric/Behavioral:  Negative for agitation, dysphoric mood and sleep disturbance. The patient is not nervous/anxious.      Objective:     Vital Signs (Most Recent):  Temp: 98.6 °F (37 °C) (01/05/25 0822)  Pulse: 91 (01/05/25 0822)  Resp: 18 (01/05/25 0822)  BP: (!) 169/79 (01/05/25 0822)  SpO2: 96 % (01/05/25 0822) Vital Signs (24h Range):  Temp:  [97.9 °F (36.6 °C)-98.8 °F (37.1 °C)] 98.6 °F (37 °C)  Pulse:  [68-91] 91  Resp:  [18] 18  SpO2:  [94 %-96 %] 96 %  BP: (125-177)/(57-91) 169/79     Weight: 97.1 kg (214 lb)  Body mass index is 32.54 kg/m².    Intake/Output Summary (Last 24 hours) at 1/5/2025 1017  Last data filed at 1/5/2025 0905  Gross per 24 hour   Intake 240 ml   Output 4300 ml   Net -4060 ml      Physical Exam  Constitutional:       General: She is not in acute distress.     Appearance: Normal appearance. She is obese. She is not ill-appearing, toxic-appearing or diaphoretic.   HENT:      Head: Normocephalic and atraumatic.      Right Ear: External ear normal.      Left Ear: External ear normal.      Nose: Nose normal.      Mouth/Throat:      Mouth: Mucous membranes are moist.   Eyes:      General: No scleral icterus.        Right eye: No discharge.         Left eye: No discharge.      Extraocular Movements: Extraocular movements intact.   Cardiovascular:      Rate and Rhythm:  "Normal rate.      Heart sounds: Normal heart sounds. No murmur heard.     No friction rub.   Pulmonary:      Effort: Pulmonary effort is normal. No respiratory distress.      Breath sounds: No stridor. Rales present. No wheezing or rhonchi.   Abdominal:      General: Abdomen is flat. Bowel sounds are normal. There is distension.   Musculoskeletal:         General: No swelling.      Cervical back: Normal range of motion.      Right lower leg: Edema present.      Left lower leg: Edema present.   Skin:     General: Skin is warm and dry.      Capillary Refill: Capillary refill takes less than 2 seconds.      Coloration: Skin is not jaundiced or pale.      Findings: No bruising.   Neurological:      General: No focal deficit present.      Mental Status: She is alert and oriented to person, place, and time.      Motor: No weakness.   Psychiatric:         Mood and Affect: Mood normal.         Behavior: Behavior normal.         Thought Content: Thought content normal.         MELD 3.0: 25 at 1/2/2025  5:26 AM  MELD-Na: 25 at 1/2/2025  5:26 AM  Calculated from:  Serum Creatinine: On dialysis. Using the maximum value.  Serum Sodium: 130 mmol/L at 1/2/2025  5:26 AM  Total Bilirubin: 0.2 mg/dL (Using min of 1 mg/dL) at 1/2/2025  5:26 AM  Serum Albumin: 2.3 g/dL at 1/2/2025  5:26 AM  INR(ratio): 1 at 12/31/2024  9:37 AM  Age at listing (hypothetical): 74 years  Sex: Female at 1/2/2025  5:26 AM      Significant Labs:  CBC:  Recent Labs   Lab 01/05/25  0511   WBC 8.49   HGB 9.1*   HCT 28.2*        CMP:  Recent Labs   Lab 01/04/25  0839   *   K 4.2   CL 94*   CO2 18*   *   BUN 46*   CREATININE 3.2*   CALCIUM 7.7*   ALBUMIN 2.3*   ANIONGAP 13     PTINR:  No results for input(s): "INR" in the last 48 hours.    Significant Procedures:   Dobutamine Stress Test with Color Flow: No results found. However, due to the size of the patient record, not all encounters were searched. Please check Results Review for a " complete set of results.    None

## 2025-01-05 NOTE — PROGRESS NOTES
Pharmacokinetic Initial Assessment: IV Vancomycin    Assessment/Plan:    Patient received vancomycin load on 12/31 of 2500 mg.   Patient has received two dialysis sessions since that time.   Suspect there is still vancomycin in system so dose was reduced to 1250 mg once.   Re-dose when level is less than 20, draw random on 1/6 with AM labs before scheduled dialysis.   Vancomycin level goal is 15 - 20 for bacteremia.   Pharmacy will continue to follow and monitor vancomycin.      Please contact pharmacy at extension [phone] with any questions regarding this assessment.     Thank you for the consult,   Roseline Jovel       Patient brief summary:  Cecile Bowen is a 74 y.o. female initiated on antimicrobial therapy with IV Vancomycin for treatment of suspected bacteremia    Drug Allergies:   Review of patient's allergies indicates:  No Known Allergies    Actual Body Weight:   97.1 kg     Renal Function:   Estimated Creatinine Clearance: 19.4 mL/min (A) (based on SCr of 3.2 mg/dL (H)).,     Dialysis Method (if applicable):  intermittent HD    CBC (last 72 hours):  Recent Labs   Lab Result Units 01/02/25  0526 01/03/25  0554   WBC K/uL 6.84 7.51   Hemoglobin g/dL 8.5* 9.3*   Hematocrit % 26.2* 30.4*   Platelets K/uL 245 235   Gran % % 64.7 58.2   Lymph % % 19.3 24.8   Mono % % 11.1 10.7   Eosinophil % % 3.4 4.5   Basophil % % 0.6 0.7   Differential Method  Automated Automated       Metabolic Panel (last 72 hours):  Recent Labs   Lab Result Units 01/02/25  0526 01/03/25  0554 01/04/25  0839   Sodium mmol/L 130* 127* 125*   Potassium mmol/L 3.5 4.2 4.2   Chloride mmol/L 98 95 94*   CO2 mmol/L 23 22* 18*   Glucose mg/dL 152* 120* 150*   BUN mg/dL 21 33* 46*   Creatinine mg/dL 2.5* 2.9* 3.2*   Albumin g/dL 2.3* 2.4* 2.3*   Total Bilirubin mg/dL 0.2 0.3  --    Alkaline Phosphatase U/L 92 95  --    AST U/L 13 17  --    ALT U/L 10 11  --    Magnesium mg/dL 1.6 1.6  --    Phosphorus mg/dL 3.1 5.1* 5.8*       Drug levels (last 3  "results):  No results for input(s): "VANCOMYCINRA", "VANCORANDOM", "VANCOMYCINPE", "VANCOPEAK", "VANCOMYCINTR", "VANCOTROUGH" in the last 72 hours.    Microbiologic Results:  Microbiology Results (last 7 days)       Procedure Component Value Units Date/Time    Urine culture [[id number]]  (Abnormal)  (Susceptibility) Collected: 12/31/24 1049    Order Status: Completed Specimen: Urine Updated: 01/04/25 0921     Urine Culture, Routine KLEBSIELLA PNEUMONIAE ESBL  50,000 - 99,999 cfu/ml        PROTEUS MIRABILIS  10,000 - 49,999 cfu/ml      Narrative:      Specimen Source->Urine    Blood culture [[id number]] Collected: 01/02/25 0529    Order Status: Completed Specimen: Blood from Peripheral, Hand, Right Updated: 01/04/25 0812     Blood Culture, Routine No Growth to date      No Growth to date      No Growth to date    Blood culture [[id number]] Collected: 01/02/25 0526    Order Status: Completed Specimen: Blood from Peripheral, Antecubital, Right Updated: 01/04/25 0812     Blood Culture, Routine No Growth to date      No Growth to date      No Growth to date    Blood culture x two cultures. Draw prior to antibiotics. [[id number]]  (Abnormal) Collected: 12/31/24 0937    Order Status: Completed Specimen: Blood from Peripheral, Antecubital, Right Updated: 01/03/25 1431     Blood Culture, Routine Gram stain aer bottle: Gram positive cocci in chains resembling Strep      Gram stain zelda bottle: Gram positive cocci in chains resembling Strep      Results called to and read back by: Sean Hernandez RN  01/01/2025  10:31      ENTEROCOCCUS FAECALIS  For susceptibility see order #[id number]      Narrative:      Aerobic and anaerobic    Blood culture x two cultures. Draw prior to antibiotics. [[id number]]  (Abnormal)  (Susceptibility) Collected: 12/31/24 0937    Order Status: Completed Specimen: Blood from Peripheral, Antecubital, Right Updated: 01/03/25 1429     Blood Culture, Routine Gram stain aer bottle: Gram positive cocci in " chains resembling Strep      Gram stain zelda bottle: Gram positive cocci in chains resembling Strep      Results called to and read back by: Sean Hernandez RN  01/01/2025  10:31      ENTEROCOCCUS FAECALIS    Narrative:      Aerobic and anaerobic    Rapid Organism ID by PCR (from Blood culture) [[id number]]  (Abnormal) Collected: 12/31/24 0937    Order Status: Completed Updated: 01/01/25 1149     Enterococcus faecalis Detected     Enterococcus faecium Not Detected     Listeria monocytogenes Not Detected     Staphylococcus spp. Not Detected     Staphylococcus aureus Not Detected     Staphylococcus epidermidis Not Detected     Staphylococcus lugdunensis Not Detected     Streptococcus species Not Detected     Streptococcus agalactiae Not Detected     Streptococcus pneumoniae Not Detected     Streptococcus pyogenes Not Detected     Acinetobacter calcoaceticus/baumannii complex Not Detected     Bacteroides fragilis Not Detected     Enterobacterales Not Detected     Enterobacter cloacae complex Not Detected     Escherichia coli Not Detected     Klebsiella aerogenes Not Detected     Klebsiella oxytoca Not Detected     Klebsiella pneumoniae group Not Detected     Proteus Not Detected     Salmonella sp Not Detected     Serratia marcescens Not Detected     Haemophilus influenzae Not Detected     Neisseria meningtidis Not Detected     Pseudomonas aeruginosa Not Detected     Stenotrophomonas maltophilia Not Detected     Candida albicans Not Detected     Candida auris Not Detected     Candida glabrata Not Detected     Candida krusei Not Detected     Candida parapsilosis Not Detected     Candida tropicalis Not Detected     Cryptococcus neoformans/gattii Not Detected     CTX-M (ESBL ) Test Not Applicable     IMP (Carbapenem resistant) Test Not Applicable     KPC resistance gene (Carbapenem resistant) Test Not Applicable     mcr-1  Test Not Applicable     mec A/C  Test Not Applicable     mec A/C and MREJ (MRSA) gene Test Not  Applicable     NDM (Carbapenem resistant) Test Not Applicable     OXA-48-like (Carbapenem resistant) Test Not Applicable     van A/B (VRE gene) Not Detected     VIM (Carbapenem resistant) Test Not Applicable    Narrative:      Aerobic and anaerobic    Urine Culture High Risk [[id number]]     Order Status: Completed Specimen: Urine, Catheterized

## 2025-01-05 NOTE — PROGRESS NOTES
[facility] - Internal Medicine Telemetry  Vascular Surgery  Progress Note    Patient Name: Cecile Bowen  MRN: [id number]  Admission Date: 12/31/2024  Primary Care Provider: Lurdes Navarro MD    Subjective:     Interval History: Patient with continued difficulty cannulating fistula for dialysis. Denies pain this morning. She is feeling much better.    Post-Op Info:  * No surgery found *         Medications:  Continuous Infusions:  Scheduled Meds:   anastrozole  1 mg Oral Daily    apixaban  5 mg Oral BID    atorvastatin  80 mg Oral Daily    DAPTOmycin (CUBICIN) IV (PEDS and ADULTS)  10 mg/kg Intravenous Q48H    DULoxetine  40 mg Oral Daily    epoetin chloe-ebpx (RETACRIT) injection  50 Units/kg Intravenous Every Mon, Wed, Fri    ipratropium  2 spray Each Nostril BID    levothyroxine  50 mcg Oral Before breakfast    oxybutynin  10 mg Oral Daily    piperacillin-tazobactam (Zosyn) IV (PEDS and ADULTS) (extended infusion is not appropriate)  4.5 g Intravenous Q12H    sodium bicarbonate  650 mg Oral Daily     PRN Meds:  Current Facility-Administered Medications:     acetaminophen, 650 mg, Oral, Q4H PRN    dextrose 50%, 12.5 g, Intravenous, PRN    dextrose 50%, 25 g, Intravenous, PRN    glucagon (human recombinant), 1 mg, Intramuscular, PRN    glucose, 16 g, Oral, PRN    glucose, 24 g, Oral, PRN    heparin (porcine), 1,000 Units, Intra-Catheter, PRN    HYDROcodone-acetaminophen, 1 tablet, Oral, Q4H PRN    methocarbamoL, 750 mg, Oral, TID PRN    naloxone, 0.02 mg, Intravenous, PRN    ondansetron, 8 mg, Intravenous, Q6H PRN    polyethylene glycol, 17 g, Oral, Daily PRN    sodium chloride 0.9%, 5 mL, Intravenous, PRN    traZODone, 100 mg, Oral, Nightly PRN     Objective:     Vital Signs (Most Recent):  Temp: 97.5 °F (36.4 °C) (01/03/25 0431)  Pulse: 74 (01/03/25 0535)  Resp: 18 (01/03/25 0431)  BP: (!) 153/79 (01/03/25 0431)  SpO2: 96 % (01/03/25 0431) Vital Signs (24h Range):  Temp:  [97.5 °F (36.4 °C)-99 °F (37.2  °C)] 97.5 °F (36.4 °C)  Pulse:  [69-81] 74  Resp:  [18] 18  SpO2:  [95 %-98 %] 96 %  BP: (147-175)/(70-95) 153/79          Physical Exam  Constitutional:       General: She is not in acute distress.     Appearance: Normal appearance. She is not ill-appearing.   HENT:      Head: Normocephalic and atraumatic.      Nose: Nose normal.      Mouth/Throat:      Mouth: Mucous membranes are moist.   Eyes:      Extraocular Movements: Extraocular movements intact.      Conjunctiva/sclera: Conjunctivae normal.   Cardiovascular:      Rate and Rhythm: Normal rate and regular rhythm.      Comments: Palpable thrill in LUE AVF  Pulmonary:      Effort: Pulmonary effort is normal. No respiratory distress.   Abdominal:      General: There is no distension.      Palpations: Abdomen is soft.   Musculoskeletal:      Cervical back: Normal range of motion.   Skin:     General: Skin is warm and dry.   Neurological:      General: No focal deficit present.      Mental Status: She is alert and oriented to person, place, and time. Mental status is at baseline.          Significant Labs:  CBC:   Recent Labs   Lab 01/05/25  0511   WBC 8.49   RBC 3.01*   HGB 9.1*   HCT 28.2*      MCV 94   MCH 30.2   MCHC 32.3     CMP:   Recent Labs   Lab 01/04/25  0839   *   CALCIUM 7.7*   ALBUMIN 2.3*   *   K 4.2   CO2 18*   CL 94*   BUN 46*   CREATININE 3.2*       Significant Diagnostics:  I have reviewed all pertinent imaging results/findings within the past 24 hours.  Assessment/Plan:     ESRD (end stage renal disease) on dialysis  75yo female with ESRD on HD 2x/week on Monday and Friday. She has been using the LUE AVG and right IJ permacath for HD but occasionally the LUE graft with clot and not work for HD. She takes eliquis currently.     HD U/S without hemodynamically significant stenosis, small overlying hematoma from apparent infiltration at dialysis. Has continued to have difficulty cannulating for dialysis.    - Plan for fistulagram  tomorrow morning  - Please make NPO at midnight  - Patient has been consented and marked  - Okay to continue her AC for procedure        Abigail Smith MD  Vascular Surgery  [facility] - [facility] Medicine Telemetry

## 2025-01-05 NOTE — ASSESSMENT & PLAN NOTE
75yo female with ESRD on HD 2x/week on Monday and Friday. She has been using the LUE AVG and right IJ permacath for HD but occasionally the LUE graft with clot and not work for HD. She takes eliquis currently.     HD U/S without hemodynamically significant stenosis, small overlying hematoma from apparent infiltration at dialysis. Has continued to have difficulty cannulating for dialysis.    - Plan for fistulagram tomorrow morning  - Please make NPO at midnight  - Patient has been consented and marked  - Okay to continue her AC for procedure

## 2025-01-05 NOTE — PROGRESS NOTES
[facility] - Internal Medicine [facility]  Progress Note    Patient Name: Cecile Bowen  MRN: [id number]  Patient Class: IP- Inpatient   Admission Date: 12/31/2024  Length of Stay: 5 days  Attending Physician: Cole Leggett MD  Primary Care Provider: Lurdes Navarro MD        Subjective     Principal Problem:Severe sepsis        HPI:  Ms. Cecile Bowen is a 74 y.o. female with ESRD on HD, T2DM, afib on Eliquis, HTN, obesity, history of breast cancer, chronic suprapubic catheter for neurogenic bladder and chronic back pain who presented to the [facility] ED 12/31 from Vascular Surgery for evaluation of fever and AMS.  History is obtained from patient.  She reports that over the last few days, she has been having fever and lethargy.  She went to see her Vascular Surgeon for right IJ PermCath removal, as her fistula has matured.  She is currently dialyzing only Mondays and Fridays via right IJ PermCath.  However, upon arrival to clinic she was febrile to 100.9F, slow to respond and tachy to 104.  She was sent to the ER for further evaluation.  At baseline, she uses a wheelchair.  She lives with her sister.  She currently endorses back pain that is not escalated from normal.  She is full code.  She took all of her medications prior to going to clinic except her Eliquis.    Upon arrival to the ER, vitals were temp 101.7F, , /77.  Labs showed WBC 20, Hgb 11.3, Sodium 129, BUN/Cr 28/3.2, Lactic 0.8.  UA was dirty.  Suprapubic catheter was exchanged in the ED.  CXR was negative.  She was given Vanc and Zosyn was admitted to Hospital Medicine for further management.    Overview/Hospital Course:  Ms. Bowen was admitted to Hospital Medicine for management of severe sepsis 2/2 Enterococcus bacteremia and a suprapubic UTI.  She was started on Zosyn.  After her blood cx returned positive, ID was consulted and she was started on Dapto for Enterococcus. Urine cx returned with ESBL Klebsiella and  Proteus. Had issues with AVF for HD during stay. Vascular surgery was consulted with plans for fistulogram on 1/6.     Interval History: NAEON and VSS.     Review of Systems   Constitutional:  Negative for chills and fever.   HENT:  Negative for ear pain and sinus pain.    Eyes:  Negative for pain.   Respiratory:  Negative for cough and shortness of breath.    Cardiovascular:  Negative for chest pain and leg swelling.   Gastrointestinal:  Negative for abdominal pain, constipation, diarrhea, nausea, rectal pain and vomiting.   Genitourinary:  Negative for dysuria and flank pain.   Musculoskeletal:  Negative for arthralgias, back pain, joint swelling, myalgias and neck pain.   Neurological:  Negative for weakness, numbness and headaches.   Psychiatric/Behavioral:  Negative for agitation, dysphoric mood and sleep disturbance. The patient is not nervous/anxious.      Objective:     Vital Signs (Most Recent):  Temp: 98.6 °F (37 °C) (01/05/25 0822)  Pulse: 91 (01/05/25 0822)  Resp: 18 (01/05/25 0822)  BP: (!) 169/79 (01/05/25 0822)  SpO2: 96 % (01/05/25 0822) Vital Signs (24h Range):  Temp:  [97.9 °F (36.6 °C)-98.8 °F (37.1 °C)] 98.6 °F (37 °C)  Pulse:  [68-91] 91  Resp:  [18] 18  SpO2:  [94 %-96 %] 96 %  BP: (125-177)/(57-91) 169/79     Weight: 97.1 kg (214 lb)  Body mass index is 32.54 kg/m².    Intake/Output Summary (Last 24 hours) at 1/5/2025 1017  Last data filed at 1/5/2025 0905  Gross per 24 hour   Intake 240 ml   Output 4300 ml   Net -4060 ml      Physical Exam  Constitutional:       General: She is not in acute distress.     Appearance: Normal appearance. She is obese. She is not ill-appearing, toxic-appearing or diaphoretic.   HENT:      Head: Normocephalic and atraumatic.      Right Ear: External ear normal.      Left Ear: External ear normal.      Nose: Nose normal.      Mouth/Throat:      Mouth: Mucous membranes are moist.   Eyes:      General: No scleral icterus.        Right eye: No discharge.         Left  "eye: No discharge.      Extraocular Movements: Extraocular movements intact.   Cardiovascular:      Rate and Rhythm: Normal rate.      Heart sounds: Normal heart sounds. No murmur heard.     No friction rub.   Pulmonary:      Effort: Pulmonary effort is normal. No respiratory distress.      Breath sounds: No stridor. Rales present. No wheezing or rhonchi.   Abdominal:      General: Abdomen is flat. Bowel sounds are normal. There is distension.   Musculoskeletal:         General: No swelling.      Cervical back: Normal range of motion.      Right lower leg: Edema present.      Left lower leg: Edema present.   Skin:     General: Skin is warm and dry.      Capillary Refill: Capillary refill takes less than 2 seconds.      Coloration: Skin is not jaundiced or pale.      Findings: No bruising.   Neurological:      General: No focal deficit present.      Mental Status: She is alert and oriented to person, place, and time.      Motor: No weakness.   Psychiatric:         Mood and Affect: Mood normal.         Behavior: Behavior normal.         Thought Content: Thought content normal.         MELD 3.0: 25 at 1/2/2025  5:26 AM  MELD-Na: 25 at 1/2/2025  5:26 AM  Calculated from:  Serum Creatinine: On dialysis. Using the maximum value.  Serum Sodium: 130 mmol/L at 1/2/2025  5:26 AM  Total Bilirubin: 0.2 mg/dL (Using min of 1 mg/dL) at 1/2/2025  5:26 AM  Serum Albumin: 2.3 g/dL at 1/2/2025  5:26 AM  INR(ratio): 1 at 12/31/2024  9:37 AM  Age at listing (hypothetical): 74 years  Sex: Female at 1/2/2025  5:26 AM      Significant Labs:  CBC:  Recent Labs   Lab 01/05/25  0511   WBC 8.49   HGB 9.1*   HCT 28.2*        CMP:  Recent Labs   Lab 01/04/25  0839   *   K 4.2   CL 94*   CO2 18*   *   BUN 46*   CREATININE 3.2*   CALCIUM 7.7*   ALBUMIN 2.3*   ANIONGAP 13     PTINR:  No results for input(s): "INR" in the last 48 hours.    Significant Procedures:   Dobutamine Stress Test with Color Flow: No results found. " However, due to the size of the patient record, not all encounters were searched. Please check Results Review for a complete set of results.    None    Assessment and Plan     * Severe sepsis  Pt admitted for severe sepsis 2/2 CAUTI and Enterococcus bacteremia. On arrival febrile to 101.7F, tachycardic 102 with WBC 21 and slow to respond (encephalopathy). Suprapubic catheter changed in the ED. WBC down to 12, Procal 1.4. Urine cx with ESBL Klebsiella and Proteus. Blood cx 12.31 with Enterococcus faecalis. Blood cx 1.2 NGTD.     - ID consulted, appreciate recommendations:  -- 1 time dose of gentamicin  -- Continue vanc for Enterococcus faecalis bacteremia  -- Of note, does have a tunneled line which is likely the culprit of infection however there are issues with her AVF (see problem for further management plans) Will currently keep given HD needs and will consider removal on 1/6 after fistulogram      ESRD (end stage renal disease) on dialysis  Creatine stable for now. BMP reviewed- noted Estimated Creatinine Clearance: 19.4 mL/min (A) (based on SCr of 3.2 mg/dL (H)). according to latest data. Based on current GFR, CKD stage is end stage.  Monitor UOP and serial BMP and adjust therapy as needed. Renally dose meds. Avoid nephrotoxic medications and procedures.    Nephro consult  Has been getting HD MF via right IJ PermCath  Needs f/u with Vascular Surgery for IJ removal  Vascular Surgery consulted for problem cannulating HD access, plans for fistulogram on 1/6    HTN (hypertension)  Patient's blood pressure range in the last 24 hours was: BP  Min: 148/76  Max: 184/74.The patient's inpatient anti-hypertensive regimen is listed below:  Current Antihypertensives  losartan tablet 100 mg, Daily, Oral  carvediloL tablet 12.5 mg, 2 times daily, Oral    Plan  - BP increasing since holding BP meds for sepsis  - continue Coreg and Losartan.  - restarting home norvasc    Positive JASON (antinuclear antibody)  History  noted      Personal history of malignant neoplasm of breast  History noted  Continue Arimidex      Pressure injury of right buttock, stage 3  Wound Care      Anemia in ESRD (end-stage renal disease)  Anemia is likely due to chronic disease due to ESRD. Most recent hemoglobin and hematocrit are listed below.  Recent Labs     01/01/25  0419 01/02/25  0526 01/03/25  0554   HGB 8.7* 8.5* 9.3*   HCT 27.8* 26.2* 30.4*       Plan  - Monitor serial CBC: Daily  - Transfuse PRBC if patient becomes hemodynamically unstable, symptomatic or H/H drops below 7/21.  - Patient has not received any PRBC transfusions to date  - Patient's anemia is currently dropping, no source of bleeding possibly a lab error, monitor for now    Unspecified atrial fibrillation  Patient has paroxysmal (<7 days) atrial fibrillation. Patient is currently in sinus rhythm. [id number] Score: 3. The patients heart rate in the last 24 hours is as follows:  Pulse  Min: 69  Max: 81     Antiarrhythmics       Anticoagulants  apixaban tablet 5 mg, 2 times daily, Oral  heparin (porcine) injection 1,000 Units, As needed (PRN), Intra-Catheter    Plan  - Replete lytes with a goal of K>4, Mg >2  - Patient is anticoagulated, see medications listed above.  - Patient's afib is currently controlled  - Previously had afib, and on Eliquis once a day because of bruising?  As she is being monitored will put on appropriate BID dosing    Bacteremia due to Enterococcus  See severe sepsis      Catheter-associated urinary tract infection  See severe sepsis      Type 2 diabetes mellitus treated with insulin  HbA1c 6.2, sugars controlled  Home DM regimen:  Largine 15 qHS  Hold scheduled insulin for now with AMS and decreased oral intake  SSI with POCT accuchecks to achieve blood glucose of 140-180 while hospitalized  Hypoglycemia protocol  Diabetic diet        Prophylactic use of anastrozole (Arimidex)  On Arimidex for history of breast cancer      Obesity, diabetes, and hypertension  syndrome  Noted    Chronic pain  2/2 lumbar spondylosis  See above      Lumbar spondylosis  Chronic issue  Continue Cymbalta  Continue Robaxin TID PRN  Continue Norco prn      Class 1 obesity due to excess calories with serious comorbidity in adult  Body mass index is 32.54 kg/m². Morbid obesity complicates all aspects of disease management from diagnostic modalities to treatment. Weight loss encouraged and health benefits explained to patient.         Major depressive disorder, recurrent, moderate  Patient has recurrent depression which is moderate and is currently controlled. Will Continue anti-depressant medications. We will not consult psychiatry at this time. Patient does not display psychosis at this time. Continue to monitor closely and adjust plan of care as needed.      Chronic and stable  Continue Cymbalta      Neurogenic bladder  Chronic issue  Has suprapubic cath  Continue Oxybutynin      Hyponatremia  Hyponatremia is likely due to volume overload from ESRD. The patient's most recent sodium results are listed below.  Recent Labs     01/01/25  0419 01/02/25  0526 01/03/25  0554   * 130* 127*       Plan  - Correct the sodium by 4-6mEq in 24 hours.   - Will treat the hyponatremia with HD  - Monitor sodium Daily.   - Patient hyponatremia is stable    Hyperlipidemia  Chronic and stable  Continue Lipitor    Hypothyroidism  Chronic and stable  Continue Synthroid  TSH 1.631        VTE Risk Mitigation (From admission, onward)           Ordered     heparin (porcine) injection 1,000 Units  As needed (PRN)         01/01/25 1610     apixaban tablet 5 mg  2 times daily         12/31/24 1251                    Discharge Planning   FLORENTINO: 1/5/2025     Code Status: Full Code   Medical Readiness for Discharge Date:   Discharge Plan A: Home Health                        oCle Leggett MD  Department of Hospital Medicine   [facility] - Internal Medicine Telemetry

## 2025-01-05 NOTE — PLAN OF CARE
Problem: Adult Inpatient Plan of Care  Goal: Plan of Care Review  Outcome: Progressing  Goal: Patient-Specific Goal (Individualized)  Outcome: Progressing  Goal: Absence of Hospital-Acquired Illness or Injury  Outcome: Progressing  Goal: Optimal Comfort and Wellbeing  Outcome: Progressing  Goal: Readiness for Transition of Care  Outcome: Progressing     Problem: Skin Injury Risk Increased  Goal: Skin Health and Integrity  Outcome: Progressing     Problem: Hemodialysis  Goal: Safe, Effective Therapy Delivery  Outcome: Progressing  Goal: Effective Tissue Perfusion  Outcome: Progressing  Goal: Absence of Infection Signs and Symptoms  Outcome: Progressing     Problem: Diabetes Comorbidity  Goal: Blood Glucose Level Within Targeted Range  Outcome: Progressing     Problem: Sepsis/Septic Shock  Goal: Optimal Coping  Outcome: Progressing  Goal: Absence of Bleeding  Outcome: Progressing  Goal: Blood Glucose Level Within Targeted Range  Outcome: Progressing  Goal: Absence of Infection Signs and Symptoms  Outcome: Progressing  Goal: Optimal Nutrition Intake  Outcome: Progressing     Problem: Acute Kidney Injury/Impairment  Goal: Fluid and Electrolyte Balance  Outcome: Progressing  Goal: Improved Oral Intake  Outcome: Progressing  Goal: Effective Renal Function  Outcome: Progressing     Problem: Infection  Goal: Absence of Infection Signs and Symptoms  Outcome: Progressing     Problem: Wound  Goal: Optimal Functional Ability  Outcome: Progressing  Goal: Absence of Infection Signs and Symptoms  Outcome: Progressing  Goal: Improved Oral Intake  Outcome: Progressing  Goal: Optimal Pain Control and Function  Outcome: Progressing  Goal: Skin Health and Integrity  Outcome: Progressing  Goal: Optimal Wound Healing  Outcome: Progressing     Problem: Chronic Kidney Disease  Goal: Electrolyte Balance  Outcome: Progressing  Goal: Fluid Balance  Outcome: Progressing

## 2025-01-05 NOTE — ASSESSMENT & PLAN NOTE
Pt admitted for severe sepsis 2/2 CAUTI and Enterococcus bacteremia. On arrival febrile to 101.7F, tachycardic 102 with WBC 21 and slow to respond (encephalopathy). Suprapubic catheter changed in the ED. WBC down to 12, Procal 1.4. Urine cx with ESBL Klebsiella and Proteus. Blood cx 12.31 with Enterococcus faecalis. Blood cx 1.2 NGTD.     - ID consulted, appreciate recommendations:  -- 1 time dose of gentamicin  -- Continue vanc for Enterococcus faecalis bacteremia  -- Of note, does have a tunneled line which is likely the culprit of infection however there are issues with her AVF (see problem for further management plans) Will currently keep given HD needs and will consider removal on 1/6 after fistulogram

## 2025-01-05 NOTE — PLAN OF CARE
Problem: Adult Inpatient Plan of Care  Goal: Plan of Care Review  Outcome: Progressing  Goal: Optimal Comfort and Wellbeing  Outcome: Progressing  Goal: Readiness for Transition of Care  Outcome: Progressing     Problem: Hemodialysis  Goal: Safe, Effective Therapy Delivery  Outcome: Progressing     Problem: Sepsis/Septic Shock  Goal: Optimal Coping  Outcome: Progressing     Problem: Infection  Goal: Absence of Infection Signs and Symptoms  Outcome: Progressing     Problem: Wound  Goal: Optimal Pain Control and Function  Outcome: Progressing  Goal: Skin Health and Integrity  Outcome: Progressing  Goal: Optimal Wound Healing  Outcome: Progressing     Pt observed lying in bed in no apparent distress. No c/o pain or discomfort on morning assessment. Pt was transferred via stretcher to dialysis. 3L removed, pt tolerated well. VSS. Back pain managed with PRN pain meds. Bed locked, in lowest position, call light in reach.

## 2025-01-06 LAB
ALBUMIN SERPL BCP-MCNC: 2.4 G/DL (ref 3.5–5.2)
ALP SERPL-CCNC: 78 U/L (ref 40–150)
ALT SERPL W/O P-5'-P-CCNC: 15 U/L (ref 10–44)
ANION GAP SERPL CALC-SCNC: 9 MMOL/L (ref 8–16)
AST SERPL-CCNC: 15 U/L (ref 10–40)
BASOPHILS # BLD AUTO: 0.06 K/UL (ref 0–0.2)
BASOPHILS NFR BLD: 0.8 % (ref 0–1.9)
BILIRUB SERPL-MCNC: 0.3 MG/DL (ref 0.1–1)
BUN SERPL-MCNC: 35 MG/DL (ref 8–23)
CALCIUM SERPL-MCNC: 8 MG/DL (ref 8.7–10.5)
CHLORIDE SERPL-SCNC: 94 MMOL/L (ref 95–110)
CO2 SERPL-SCNC: 22 MMOL/L (ref 23–29)
CREAT SERPL-MCNC: 2.9 MG/DL (ref 0.5–1.4)
DIFFERENTIAL METHOD BLD: ABNORMAL
EOSINOPHIL # BLD AUTO: 0.4 K/UL (ref 0–0.5)
EOSINOPHIL NFR BLD: 4.5 % (ref 0–8)
ERYTHROCYTE [DISTWIDTH] IN BLOOD BY AUTOMATED COUNT: 12.9 % (ref 11.5–14.5)
EST. GFR  (NO RACE VARIABLE): 16.7 ML/MIN/1.73 M^2
GLUCOSE SERPL-MCNC: 133 MG/DL (ref 70–110)
HCT VFR BLD AUTO: 30.1 % (ref 37–48.5)
HGB BLD-MCNC: 9.4 G/DL (ref 12–16)
IMM GRANULOCYTES # BLD AUTO: 0.17 K/UL (ref 0–0.04)
IMM GRANULOCYTES NFR BLD AUTO: 2.2 % (ref 0–0.5)
LYMPHOCYTES # BLD AUTO: 2 K/UL (ref 1–4.8)
LYMPHOCYTES NFR BLD: 26.2 % (ref 18–48)
MAGNESIUM SERPL-MCNC: 1.6 MG/DL (ref 1.6–2.6)
MCH RBC QN AUTO: 29.8 PG (ref 27–31)
MCHC RBC AUTO-ENTMCNC: 31.2 G/DL (ref 32–36)
MCV RBC AUTO: 96 FL (ref 82–98)
MONOCYTES # BLD AUTO: 0.9 K/UL (ref 0.3–1)
MONOCYTES NFR BLD: 11.5 % (ref 4–15)
NEUTROPHILS # BLD AUTO: 4.3 K/UL (ref 1.8–7.7)
NEUTROPHILS NFR BLD: 54.8 % (ref 38–73)
NRBC BLD-RTO: 0 /100 WBC
PHOSPHATE SERPL-MCNC: 5.6 MG/DL (ref 2.7–4.5)
PLATELET # BLD AUTO: 249 K/UL (ref 150–450)
PMV BLD AUTO: 8.8 FL (ref 9.2–12.9)
POCT GLUCOSE: 127 MG/DL (ref 70–110)
POCT GLUCOSE: 146 MG/DL (ref 70–110)
POCT GLUCOSE: 213 MG/DL (ref 70–110)
POCT GLUCOSE: 247 MG/DL (ref 70–110)
POTASSIUM SERPL-SCNC: 4.2 MMOL/L (ref 3.5–5.1)
PROT SERPL-MCNC: 6.1 G/DL (ref 6–8.4)
RBC # BLD AUTO: 3.15 M/UL (ref 4–5.4)
SODIUM SERPL-SCNC: 125 MMOL/L (ref 136–145)
VANCOMYCIN SERPL-MCNC: 21.7 UG/ML
WBC # BLD AUTO: 7.8 K/UL (ref 3.9–12.7)

## 2025-01-06 PROCEDURE — 36901 INTRO CATH DIALYSIS CIRCUIT: CPT | Mod: HCNC,,, | Performed by: SURGERY

## 2025-01-06 PROCEDURE — B51WYZZ FLUOROSCOPY OF DIALYSIS SHUNT/FISTULA USING OTHER CONTRAST: ICD-10-PCS | Performed by: SURGERY

## 2025-01-06 PROCEDURE — 63600175 PHARM REV CODE 636 W HCPCS: Mod: TB,HCNC | Performed by: NURSE PRACTITIONER

## 2025-01-06 PROCEDURE — 84100 ASSAY OF PHOSPHORUS: CPT | Mod: HCNC

## 2025-01-06 PROCEDURE — 90935 HEMODIALYSIS ONE EVALUATION: CPT | Mod: HCNC,,, | Performed by: NURSE PRACTITIONER

## 2025-01-06 PROCEDURE — 80100014 HC HEMODIALYSIS 1:1: Mod: HCNC

## 2025-01-06 PROCEDURE — 25000003 PHARM REV CODE 250: Mod: HCNC | Performed by: NURSE PRACTITIONER

## 2025-01-06 PROCEDURE — 80202 ASSAY OF VANCOMYCIN: CPT | Mod: HCNC

## 2025-01-06 PROCEDURE — 63600175 PHARM REV CODE 636 W HCPCS: Mod: HCNC

## 2025-01-06 PROCEDURE — 99232 SBSQ HOSP IP/OBS MODERATE 35: CPT | Mod: 25,HCNC,, | Performed by: SURGERY

## 2025-01-06 PROCEDURE — 25500020 PHARM REV CODE 255: Mod: HCNC | Performed by: SURGERY

## 2025-01-06 PROCEDURE — 25000003 PHARM REV CODE 250: Mod: HCNC

## 2025-01-06 PROCEDURE — 83735 ASSAY OF MAGNESIUM: CPT | Mod: HCNC

## 2025-01-06 PROCEDURE — C1894 INTRO/SHEATH, NON-LASER: HCPCS | Mod: HCNC | Performed by: SURGERY

## 2025-01-06 PROCEDURE — 27000207 HC ISOLATION: Mod: HCNC

## 2025-01-06 PROCEDURE — 80053 COMPREHEN METABOLIC PANEL: CPT | Mod: HCNC

## 2025-01-06 PROCEDURE — 63600175 PHARM REV CODE 636 W HCPCS: Mod: HCNC | Performed by: SURGERY

## 2025-01-06 PROCEDURE — 63600175 PHARM REV CODE 636 W HCPCS: Mod: HCNC | Performed by: STUDENT IN AN ORGANIZED HEALTH CARE EDUCATION/TRAINING PROGRAM

## 2025-01-06 PROCEDURE — 85025 COMPLETE CBC W/AUTO DIFF WBC: CPT | Mod: HCNC

## 2025-01-06 PROCEDURE — 36901 INTRO CATH DIALYSIS CIRCUIT: CPT | Mod: HCNC | Performed by: SURGERY

## 2025-01-06 PROCEDURE — 99152 MOD SED SAME PHYS/QHP 5/>YRS: CPT | Mod: HCNC,,, | Performed by: SURGERY

## 2025-01-06 PROCEDURE — 25000003 PHARM REV CODE 250: Mod: HCNC | Performed by: HOSPITALIST

## 2025-01-06 PROCEDURE — 21400001 HC TELEMETRY ROOM: Mod: HCNC

## 2025-01-06 PROCEDURE — 25000003 PHARM REV CODE 250: Mod: HCNC | Performed by: STUDENT IN AN ORGANIZED HEALTH CARE EDUCATION/TRAINING PROGRAM

## 2025-01-06 RX ORDER — IBUPROFEN 200 MG
24 TABLET ORAL
Status: DISCONTINUED | OUTPATIENT
Start: 2025-01-06 | End: 2025-01-11 | Stop reason: HOSPADM

## 2025-01-06 RX ORDER — MIDAZOLAM HYDROCHLORIDE 1 MG/ML
INJECTION, SOLUTION INTRAMUSCULAR; INTRAVENOUS
Status: DISCONTINUED | OUTPATIENT
Start: 2025-01-06 | End: 2025-01-06 | Stop reason: HOSPADM

## 2025-01-06 RX ORDER — FENTANYL CITRATE 50 UG/ML
INJECTION, SOLUTION INTRAMUSCULAR; INTRAVENOUS
Status: DISCONTINUED | OUTPATIENT
Start: 2025-01-06 | End: 2025-01-06 | Stop reason: HOSPADM

## 2025-01-06 RX ORDER — SODIUM CHLORIDE 9 MG/ML
INJECTION, SOLUTION INTRAVENOUS ONCE
Status: COMPLETED | OUTPATIENT
Start: 2025-01-06 | End: 2025-01-06

## 2025-01-06 RX ORDER — SEVELAMER CARBONATE 800 MG/1
800 TABLET, FILM COATED ORAL
Status: DISCONTINUED | OUTPATIENT
Start: 2025-01-06 | End: 2025-01-11 | Stop reason: HOSPADM

## 2025-01-06 RX ORDER — INSULIN ASPART 100 [IU]/ML
1-10 INJECTION, SOLUTION INTRAVENOUS; SUBCUTANEOUS
Status: DISCONTINUED | OUTPATIENT
Start: 2025-01-06 | End: 2025-01-11 | Stop reason: HOSPADM

## 2025-01-06 RX ORDER — GLUCAGON 1 MG
1 KIT INJECTION
Status: DISCONTINUED | OUTPATIENT
Start: 2025-01-06 | End: 2025-01-11 | Stop reason: HOSPADM

## 2025-01-06 RX ORDER — IBUPROFEN 200 MG
16 TABLET ORAL
Status: DISCONTINUED | OUTPATIENT
Start: 2025-01-06 | End: 2025-01-11 | Stop reason: HOSPADM

## 2025-01-06 RX ORDER — LIDOCAINE HYDROCHLORIDE 20 MG/ML
INJECTION, SOLUTION INFILTRATION; PERINEURAL
Status: DISCONTINUED | OUTPATIENT
Start: 2025-01-06 | End: 2025-01-06 | Stop reason: HOSPADM

## 2025-01-06 RX ORDER — HEPARIN SOD,PORCINE/0.9 % NACL 1000/500ML
INTRAVENOUS SOLUTION INTRAVENOUS
Status: DISCONTINUED | OUTPATIENT
Start: 2025-01-06 | End: 2025-01-06 | Stop reason: HOSPADM

## 2025-01-06 RX ADMIN — SODIUM CHLORIDE: 9 INJECTION, SOLUTION INTRAVENOUS at 02:01

## 2025-01-06 RX ADMIN — ANASTROZOLE 1 MG: 1 TABLET, COATED ORAL at 08:01

## 2025-01-06 RX ADMIN — EPOETIN ALFA-EPBX 4900 UNITS: 10000 INJECTION, SOLUTION INTRAVENOUS; SUBCUTANEOUS at 05:01

## 2025-01-06 RX ADMIN — IPRATROPIUM BROMIDE 2 SPRAY: 42 SPRAY, METERED NASAL at 08:01

## 2025-01-06 RX ADMIN — INSULIN ASPART 4 UNITS: 100 INJECTION, SOLUTION INTRAVENOUS; SUBCUTANEOUS at 12:01

## 2025-01-06 RX ADMIN — TRAZODONE HYDROCHLORIDE 100 MG: 100 TABLET ORAL at 08:01

## 2025-01-06 RX ADMIN — HYDROCODONE BITARTRATE AND ACETAMINOPHEN 1 TABLET: 10; 325 TABLET ORAL at 05:01

## 2025-01-06 RX ADMIN — AMLODIPINE BESYLATE 10 MG: 10 TABLET ORAL at 08:01

## 2025-01-06 RX ADMIN — SEVELAMER CARBONATE 800 MG: 800 TABLET, FILM COATED ORAL at 06:01

## 2025-01-06 RX ADMIN — VANCOMYCIN HYDROCHLORIDE 500 MG: 500 INJECTION, POWDER, LYOPHILIZED, FOR SOLUTION INTRAVENOUS at 06:01

## 2025-01-06 RX ADMIN — APIXABAN 5 MG: 5 TABLET, FILM COATED ORAL at 08:01

## 2025-01-06 RX ADMIN — MUPIROCIN: 20 OINTMENT TOPICAL at 08:01

## 2025-01-06 RX ADMIN — SODIUM BICARBONATE 650 MG TABLET 650 MG: at 08:01

## 2025-01-06 RX ADMIN — ATORVASTATIN CALCIUM 80 MG: 40 TABLET, FILM COATED ORAL at 08:01

## 2025-01-06 RX ADMIN — HYDROCODONE BITARTRATE AND ACETAMINOPHEN 1 TABLET: 10; 325 TABLET ORAL at 07:01

## 2025-01-06 RX ADMIN — LEVOTHYROXINE SODIUM 50 MCG: 0.05 TABLET ORAL at 05:01

## 2025-01-06 RX ADMIN — CARVEDILOL 12.5 MG: 12.5 TABLET, FILM COATED ORAL at 08:01

## 2025-01-06 RX ADMIN — LOSARTAN POTASSIUM 100 MG: 50 TABLET, FILM COATED ORAL at 08:01

## 2025-01-06 RX ADMIN — ACETAMINOPHEN 650 MG: 325 TABLET ORAL at 04:01

## 2025-01-06 RX ADMIN — HEPARIN SODIUM 1000 UNITS: 1000 INJECTION, SOLUTION INTRAVENOUS; SUBCUTANEOUS at 05:01

## 2025-01-06 RX ADMIN — HYDROCODONE BITARTRATE AND ACETAMINOPHEN 1 TABLET: 10; 325 TABLET ORAL at 09:01

## 2025-01-06 RX ADMIN — OXYBUTYNIN CHLORIDE 10 MG: 10 TABLET, EXTENDED RELEASE ORAL at 08:01

## 2025-01-06 RX ADMIN — METHOCARBAMOL 750 MG: 750 TABLET ORAL at 08:01

## 2025-01-06 RX ADMIN — DULOXETINE HYDROCHLORIDE 40 MG: 20 CAPSULE, DELAYED RELEASE ORAL at 09:01

## 2025-01-06 NOTE — PLAN OF CARE
Problem: Adult Inpatient Plan of Care  Goal: Plan of Care Review  Outcome: Progressing  Goal: Patient-Specific Goal (Individualized)  Outcome: Progressing  Goal: Absence of Hospital-Acquired Illness or Injury  Outcome: Progressing  Goal: Optimal Comfort and Wellbeing  Outcome: Progressing  Goal: Readiness for Transition of Care  Outcome: Progressing     Problem: Skin Injury Risk Increased  Goal: Skin Health and Integrity  Outcome: Progressing     Problem: Hemodialysis  Goal: Safe, Effective Therapy Delivery  Outcome: Progressing  Goal: Effective Tissue Perfusion  Outcome: Progressing  Goal: Absence of Infection Signs and Symptoms  Outcome: Progressing     Problem: Diabetes Comorbidity  Goal: Blood Glucose Level Within Targeted Range  Outcome: Progressing     Problem: Sepsis/Septic Shock  Goal: Optimal Coping  Outcome: Progressing  Goal: Absence of Bleeding  Outcome: Progressing  Goal: Blood Glucose Level Within Targeted Range  Outcome: Progressing  Goal: Absence of Infection Signs and Symptoms  Outcome: Progressing  Goal: Optimal Nutrition Intake  Outcome: Progressing     Problem: Acute Kidney Injury/Impairment  Goal: Fluid and Electrolyte Balance  Outcome: Progressing  Goal: Improved Oral Intake  Outcome: Progressing  Goal: Effective Renal Function  Outcome: Progressing     Problem: Infection  Goal: Absence of Infection Signs and Symptoms  Outcome: Progressing     Problem: Wound  Goal: Optimal Functional Ability  Outcome: Progressing  Goal: Absence of Infection Signs and Symptoms  Outcome: Progressing  Goal: Improved Oral Intake  Outcome: Progressing  Goal: Skin Health and Integrity  Outcome: Progressing  Goal: Optimal Wound Healing  Outcome: Progressing     Problem: Chronic Kidney Disease  Goal: Electrolyte Balance  Outcome: Progressing  Goal: Fluid Balance  Outcome: Progressing

## 2025-01-06 NOTE — ASSESSMENT & PLAN NOTE
Patient's blood pressure range in the last 24 hours was: BP  Min: 123/68  Max: 169/71.The patient's inpatient anti-hypertensive regimen is listed below:  Current Antihypertensives  losartan tablet 100 mg, Daily, Oral  carvediloL tablet 12.5 mg, 2 times daily, Oral  amLODIPine tablet 10 mg, Daily, Oral    Plan  - BP increasing since holding BP meds for sepsis  - continue Coreg and Losartan.  - restarting home norvasc

## 2025-01-06 NOTE — ASSESSMENT & PLAN NOTE
75yo female with ESRD on HD 2x/week on Monday and Friday. She has been using the LUE AVG and right IJ permacath for HD but occasionally the LUE graft with clot and not work for HD. She takes eliquis currently.     HD U/S without hemodynamically significant stenosis, small overlying hematoma from apparent infiltration at dialysis. Has continued to have difficulty cannulating for dialysis.    - OR today for fistulogram  - NPO since MN  - Patient has been consented and marked  - Okay to continue her AC for procedure

## 2025-01-06 NOTE — ASSESSMENT & PLAN NOTE
Hyponatremia is likely due to volume overload from ESRD. The patient's most recent sodium results are listed below.  Recent Labs     01/04/25  0839 01/06/25  0439   * 125*       Plan  - Correct the sodium by 4-6mEq in 24 hours.   - Will treat the hyponatremia with HD  - Monitor sodium Daily.   - Patient hyponatremia is stable

## 2025-01-06 NOTE — ASSESSMENT & PLAN NOTE
Pt admitted for severe sepsis 2/2 CAUTI and Enterococcus bacteremia. On arrival febrile to 101.7F, tachycardic 102 with WBC 21 and slow to respond (encephalopathy). Suprapubic catheter changed in the ED. WBC down to 12, Procal 1.4. Urine cx with ESBL Klebsiella and Proteus. Blood cx 12.31 with Enterococcus faecalis. Blood cx 1.2 NGTD.     - ID consulted, appreciate recommendations:  -- 1 time dose of gentamicin  -- Continue vanc for Enterococcus faecalis bacteremia  -- Of note, does have a tunneled line which is likely the culprit of infection however there are issues with her AVF (see problem for further management plans) Will currently keep given HD needs and will consider removal

## 2025-01-06 NOTE — PROGRESS NOTES
[facility] - Internal Medicine [facility]  Progress Note    Patient Name: Cecile Bowen  MRN: [id number]  Patient Class: IP- Inpatient   Admission Date: 12/31/2024  Length of Stay: 6 days  Attending Physician: Marcela Vicente,*  Primary Care Provider: Lurdes Navarro MD        Subjective     Principal Problem:Severe sepsis        HPI:  Ms. Cecile Bowen is a 74 y.o. female with ESRD on HD, T2DM, afib on Eliquis, HTN, obesity, history of breast cancer, chronic suprapubic catheter for neurogenic bladder and chronic back pain who presented to the [facility] ED 12/31 from Vascular Surgery for evaluation of fever and AMS.  History is obtained from patient.  She reports that over the last few days, she has been having fever and lethargy.  She went to see her Vascular Surgeon for right IJ PermCath removal, as her fistula has matured.  She is currently dialyzing only Mondays and Fridays via right IJ PermCath.  However, upon arrival to clinic she was febrile to 100.9F, slow to respond and tachy to 104.  She was sent to the ER for further evaluation.  At baseline, she uses a wheelchair.  She lives with her sister.  She currently endorses back pain that is not escalated from normal.  She is full code.  She took all of her medications prior to going to clinic except her Eliquis.    Upon arrival to the ER, vitals were temp 101.7F, , /77.  Labs showed WBC 20, Hgb 11.3, Sodium 129, BUN/Cr 28/3.2, Lactic 0.8.  UA was dirty.  Suprapubic catheter was exchanged in the ED.  CXR was negative.  She was given Vanc and Zosyn was admitted to Hospital Medicine for further management.    Overview/Hospital Course:  Ms. Bowen is a 74 y.o. female with ESRD on HD, T2DM, afib on Eliquis, HTN, obesity, history of breast cancer, chronic suprapubic catheter for neurogenic bladder and chronic back pain who was admitted for severe sepsis 2/2 Enterococcus bacteremia and suprapubic UTI.  She was started on  Zosyn.  After her blood cx returned positive, ID was consulted and she was started on Dapto for Enterococcus. Switched to Vanc . Plan for 8 weeks of Vanc with HD from discharge Urine cx returned with ESBL Klebsiella and Proteus, unclear if this is a UTI or just colonization. S/p one dose of gentamicin per ID Had issues with AVF for HD during stay. Vascular surgery was consulted s/p fistulogram 1/6 with good flow. Pending recs on permcath removal    Interval History:     Review of Systems  Objective:     Vital Signs (Most Recent):  Temp: 98.4 °F (36.9 °C) (01/06/25 1119)  Pulse: 71 (01/06/25 1119)  Resp: 18 (01/06/25 1119)  BP: 123/68 (01/06/25 1119)  SpO2: 95 % (01/06/25 1119) Vital Signs (24h Range):  Temp:  [97.6 °F (36.4 °C)-98.7 °F (37.1 °C)] 98.4 °F (36.9 °C)  Pulse:  [66-80] 71  Resp:  [17-18] 18  SpO2:  [94 %-99 %] 95 %  BP: (123-169)/(68-75) 123/68     Weight: 97.1 kg (214 lb)  Body mass index is 32.54 kg/m².    Intake/Output Summary (Last 24 hours) at 1/6/2025 1310  Last data filed at 1/6/2025 1229  Gross per 24 hour   Intake 480 ml   Output 700 ml   Net -220 ml      Physical Exam  Constitutional:       General: She is not in acute distress.  Cardiovascular:      Rate and Rhythm: Normal rate.      Pulses: Normal pulses.   Pulmonary:      Effort: No respiratory distress.      Breath sounds: Normal breath sounds. No wheezing.   Abdominal:      General: Bowel sounds are normal. There is no distension.      Palpations: Abdomen is soft.      Tenderness: There is no abdominal tenderness.   Musculoskeletal:      Right lower leg: No edema.      Left lower leg: No edema.   Neurological:      Mental Status: She is alert. Mental status is at baseline.         MELD 3.0: 25 at 1/2/2025  5:26 AM  MELD-Na: 25 at 1/2/2025  5:26 AM  Calculated from:  Serum Creatinine: On dialysis. Using the maximum value.  Serum Sodium: 130 mmol/L at 1/2/2025  5:26 AM  Total Bilirubin: 0.2 mg/dL (Using min of 1 mg/dL) at 1/2/2025  5:26  "AM  Serum Albumin: 2.3 g/dL at 1/2/2025  5:26 AM  INR(ratio): 1 at 12/31/2024  9:37 AM  Age at listing (hypothetical): 74 years  Sex: Female at 1/2/2025  5:26 AM      Significant Labs:  CBC:  Recent Labs   Lab 01/05/25  0511 01/06/25  0439   WBC 8.49 7.80   HGB 9.1* 9.4*   HCT 28.2* 30.1*    249     CMP:  Recent Labs   Lab 01/06/25  0439   *   K 4.2   CL 94*   CO2 22*   *   BUN 35*   CREATININE 2.9*   CALCIUM 8.0*   PROT 6.1   ALBUMIN 2.4*   BILITOT 0.3   ALKPHOS 78   AST 15   ALT 15   ANIONGAP 9     PTINR:  No results for input(s): "INR" in the last 48 hours.        Assessment and Plan     * Severe sepsis  Pt admitted for severe sepsis 2/2 CAUTI and Enterococcus bacteremia. On arrival febrile to 101.7F, tachycardic 102 with WBC 21 and slow to respond (encephalopathy). Suprapubic catheter changed in the ED. WBC down to 12, Procal 1.4. Urine cx with ESBL Klebsiella and Proteus. Blood cx 12.31 with Enterococcus faecalis. Blood cx 1.2 NGTD.     - ID consulted, appreciate recommendations:  -- 1 time dose of gentamicin  -- Continue vanc for Enterococcus faecalis bacteremia  -- Of note, does have a tunneled line which is likely the culprit of infection however there are issues with her AVF (see problem for further management plans) Will currently keep given HD needs and will consider removal     HTN (hypertension)  Patient's blood pressure range in the last 24 hours was: BP  Min: 123/68  Max: 169/71.The patient's inpatient anti-hypertensive regimen is listed below:  Current Antihypertensives  losartan tablet 100 mg, Daily, Oral  carvediloL tablet 12.5 mg, 2 times daily, Oral  amLODIPine tablet 10 mg, Daily, Oral    Plan  - BP increasing since holding BP meds for sepsis  - continue Coreg and Losartan.  - restarting home norvasc    Positive JASON (antinuclear antibody)  History noted      Personal history of malignant neoplasm of breast  History noted  Continue Arimidex      ESRD (end stage renal disease) " on dialysis  Creatine stable for now. BMP reviewed- noted Estimated Creatinine Clearance: 21.4 mL/min (A) (based on SCr of 2.9 mg/dL (H)). according to latest data. Based on current GFR, CKD stage is end stage.  Monitor UOP and serial BMP and adjust therapy as needed. Renally dose meds. Avoid nephrotoxic medications and procedures.    Nephro consult  Has been getting HD MF via right IJ PermCath  Needs f/u with Vascular Surgery for IJ removal  Vascular Surgery consulted for problem cannulating HD access   s/p fistulogram 1/6 with good flow. Pending recs on permcath removal    Pressure injury of right buttock, stage 3  Wound Care      Anemia in ESRD (end-stage renal disease)  Anemia is likely due to chronic disease due to ESRD. Most recent hemoglobin and hematocrit are listed below.  Recent Labs     01/05/25  0511 01/06/25  0439   HGB 9.1* 9.4*   HCT 28.2* 30.1*       Plan  - Monitor serial CBC: Daily  - Transfuse PRBC if patient becomes hemodynamically unstable, symptomatic or H/H drops below 7/21.  - Patient has not received any PRBC transfusions to date      Unspecified atrial fibrillation  Patient has paroxysmal (<7 days) atrial fibrillation. Patient is currently in sinus rhythm. [id number] Score: 3. The patients heart rate in the last 24 hours is as follows:  Pulse  Min: 66  Max: 80     Antiarrhythmics       Anticoagulants  apixaban tablet 5 mg, 2 times daily, Oral  heparin (porcine) injection 1,000 Units, As needed (PRN), Intra-Catheter    Plan  - Replete lytes with a goal of K>4, Mg >2  - Patient is anticoagulated, see medications listed above.  - Patient's afib is currently controlled  - Previously had afib, and on Eliquis once a day because of bruising?  As she is being monitored will put on appropriate BID dosing    Bacteremia due to Enterococcus  See severe sepsis      Catheter-associated urinary tract infection  See severe sepsis      Type 2 diabetes mellitus treated with insulin  HbA1c 6.2, sugars  controlled  Home DM regimen:  Largine 15 qHS  Hold scheduled insulin for now with AMS and decreased oral intake  SSI with POCT accuchecks to achieve blood glucose of 140-180 while hospitalized  Hypoglycemia protocol  Diabetic diet        Prophylactic use of anastrozole (Arimidex)  On Arimidex for history of breast cancer      Obesity, diabetes, and hypertension syndrome  Noted    Chronic pain  2/2 lumbar spondylosis  See above      Lumbar spondylosis  Chronic issue  Continue Cymbalta  Continue Robaxin TID PRN  Continue Norco prn      Class 1 obesity due to excess calories with serious comorbidity in adult  Body mass index is 32.54 kg/m². Morbid obesity complicates all aspects of disease management from diagnostic modalities to treatment. Weight loss encouraged and health benefits explained to patient.         Major depressive disorder, recurrent, moderate  Patient has recurrent depression which is moderate and is currently controlled. Will Continue anti-depressant medications. We will not consult psychiatry at this time. Patient does not display psychosis at this time. Continue to monitor closely and adjust plan of care as needed.      Chronic and stable  Continue Cymbalta      Neurogenic bladder  Chronic issue  Has suprapubic cath  Continue Oxybutynin      Hyponatremia  Hyponatremia is likely due to volume overload from ESRD. The patient's most recent sodium results are listed below.  Recent Labs     01/04/25  0839 01/06/25  0439   * 125*       Plan  - Correct the sodium by 4-6mEq in 24 hours.   - Will treat the hyponatremia with HD  - Monitor sodium Daily.   - Patient hyponatremia is stable    Hyperlipidemia  Chronic and stable  Continue Lipitor    Hypothyroidism  Chronic and stable  Continue Synthroid  TSH 1.631        VTE Risk Mitigation (From admission, onward)           Ordered     heparin (porcine) injection 1,000 Units  As needed (PRN)         01/01/25 1610     apixaban tablet 5 mg  2 times daily          12/31/24 1251                    Discharge Planning   FLORENTINO: 1/7/2025     Code Status: Full Code   Medical Readiness for Discharge Date:   Discharge Plan A: Home Health                        Marcela Vicente MD  Department of Hospital Medicine   [facility] - Internal Medicine Telemetry

## 2025-01-06 NOTE — SUBJECTIVE & OBJECTIVE
Medications:  Continuous Infusions:  Scheduled Meds:   amLODIPine  10 mg Oral Daily    anastrozole  1 mg Oral Daily    apixaban  5 mg Oral BID    atorvastatin  80 mg Oral Daily    carvediloL  12.5 mg Oral BID    DULoxetine  40 mg Oral Daily    epoetin chloe-ebpx (RETACRIT) injection  50 Units/kg Intravenous Every Mon, Wed, Fri    ipratropium  2 spray Each Nostril BID    levothyroxine  50 mcg Oral Before breakfast    losartan  100 mg Oral Daily    mupirocin   Nasal BID    oxybutynin  10 mg Oral Daily    sodium bicarbonate  650 mg Oral Daily     PRN Meds:  Current Facility-Administered Medications:     acetaminophen, 650 mg, Oral, Q4H PRN    dextrose 50%, 12.5 g, Intravenous, PRN    dextrose 50%, 25 g, Intravenous, PRN    glucagon (human recombinant), 1 mg, Intramuscular, PRN    glucose, 16 g, Oral, PRN    glucose, 24 g, Oral, PRN    heparin (porcine), 1,000 Units, Intra-Catheter, PRN    HYDROcodone-acetaminophen, 1 tablet, Oral, Q4H PRN    methocarbamoL, 750 mg, Oral, TID PRN    naloxone, 0.02 mg, Intravenous, PRN    ondansetron, 8 mg, Intravenous, Q6H PRN    polyethylene glycol, 17 g, Oral, Daily PRN    sodium chloride 0.9%, 5 mL, Intravenous, PRN    traZODone, 100 mg, Oral, Nightly PRN    Pharmacy to dose Vancomycin consult, , , Once **AND** vancomycin - pharmacy to dose, , Intravenous, pharmacy to manage frequency     Objective:     Vital Signs (Most Recent):  Temp: 97.6 °F (36.4 °C) (01/06/25 0521)  Pulse: 77 (01/06/25 0521)  Resp: 18 (01/06/25 0542)  BP: (!) 151/72 (01/06/25 0521)  SpO2: (!) 94 % (01/06/25 0521) Vital Signs (24h Range):  Temp:  [97.6 °F (36.4 °C)-98.8 °F (37.1 °C)] 97.6 °F (36.4 °C)  Pulse:  [66-91] 77  Resp:  [18] 18  SpO2:  [94 %-99 %] 94 %  BP: (146-169)/(66-79) 151/72          Physical Exam  Constitutional:       General: She is not in acute distress.     Appearance: Normal appearance. She is not ill-appearing.   HENT:      Head: Normocephalic and atraumatic.      Nose: Nose normal.       Mouth/Throat:      Mouth: Mucous membranes are moist.   Eyes:      Extraocular Movements: Extraocular movements intact.      Conjunctiva/sclera: Conjunctivae normal.   Cardiovascular:      Rate and Rhythm: Normal rate and regular rhythm.      Comments: Palpable thrill in LUE AVF  Pulmonary:      Effort: Pulmonary effort is normal. No respiratory distress.   Abdominal:      General: There is no distension.      Palpations: Abdomen is soft.   Musculoskeletal:      Cervical back: Normal range of motion.   Skin:     General: Skin is warm and dry.   Neurological:      General: No focal deficit present.      Mental Status: She is alert and oriented to person, place, and time. Mental status is at baseline.          Significant Labs:  CBC:   Recent Labs   Lab 01/05/25  0511   WBC 8.49   RBC 3.01*   HGB 9.1*   HCT 28.2*      MCV 94   MCH 30.2   MCHC 32.3     CMP:   Recent Labs   Lab 01/04/25  0839   *   CALCIUM 7.7*   ALBUMIN 2.3*   *   K 4.2   CO2 18*   CL 94*   BUN 46*   CREATININE 3.2*       Significant Diagnostics:  I have reviewed all pertinent imaging results/findings within the past 24 hours.

## 2025-01-06 NOTE — CONSULTS
Vascular and Interventional Radiology History & Physical    Date:  1/6/2025    Chief Complaint:   Infected line    History of Present Illness:  Cecile Bowen is a 74 y.o. female with ESRD on HD, T2DM, afib on Eliquis, HTN, obesity, history of breast cancer, chronic suprapubic catheter for neurogenic bladder and chronic back pain who presented to the [facility] ED 12/31 for evaluation of fever and AMS. She was found to be septic and was found to have bacteremia 2/2 enterococcus with most likely source being her tunneled line. IR has been consulted for tunneled line removal.     Past Medical History:  Past Medical History:   Diagnosis Date    Cervicogenic migraine     Chronic pain     CKD (chronic kidney disease) stage 4, GFR 15-29 ml/min     [facility]    CKD (chronic kidney disease) stage 4, GFR 15-29 ml/min     Diabetes mellitus     Long term use of Insulin, Diabetic Neuropathy    Dialysis patient 1/21/2022    Fibromyalgia     Hydronephrosis     Hyperlipidemia     Hypertension 12/12/2012    Hypothyroidism 12/12/2012    ARON (iron deficiency anemia)     Insomnia     Levoscoliosis     Malignant neoplasm of upper-outer quadrant of left breast in female, estrogen receptor positive     Metabolic acidosis     Mobility impaired     Nuclear sclerosis - Both Eyes 3/24/2014    VIJAYA (obstructive sleep apnea)     Osteopenia     Pulmonary nodule     Recurrent UTI     Renal manifestation of secondary diabetes mellitus     Secondary hyperparathyroidism, renal     Urinary retention        Past Surgical History:  Past Surgical History:   Procedure Laterality Date    ANGIOGRAPHY OF UPPER EXTREMITY N/A 9/19/2024    Procedure: Angiogram Extremity Bilateral;  Surgeon: RODRIGO Friedman III, MD;  Location: [facility] OR [address] [place] [address];  Service: Vascular;  Laterality: N/A;  R femoral approach, arch & arm angiogram  168.76mgy  33.1145 gycm2  8.9min    AV FISTULA PLACEMENT Left 2/14/2024    Procedure: CREATION, AV FISTULA;  Surgeon: RODRIGO Friedman III  MD;  Location: [facility] OR 2ND FLR;  Service: Vascular;  Laterality: Left;  LUE AVF creation vs AVG insertion    BIOPSY OF URETER Left 2/18/2022    Procedure: BIOPSY, URETER;  Surgeon: Ronel Whittington MD;  Location: [facility] OR 1ST FLR;  Service: Urology;  Laterality: Left;    BREAST BIOPSY Right     benign    CHOLECYSTECTOMY      COLONOSCOPY N/A 1/13/2017    Procedure: COLONOSCOPY;  Surgeon: Morris Wiseman MD;  Location: [facility] ENDO (4TH FLR);  Service: Endoscopy;  Laterality: N/A;  Renal pt Nephrology advised to avoid phosphate preps    COLONOSCOPY N/A 12/7/2023    Procedure: COLONOSCOPY;  Surgeon: Herve Allen MD;  Location: [facility] ENDO (2ND FLR);  Service: Endoscopy;  Laterality: N/A;  HD MWF; labs prior  suprapubic catheter  pt does not ambulate-uses christina lift- will have sling with her  9/7 ref. by Anastasiia Campbell, , PEG, instr. mailed, Eliquis hold approval pending-[facility] to hold Eliquis 2 days per Dr Navarro-GT  1/30-precall complete-MS    CYSTOSCOPY N/A 10/8/2018    Procedure: CYSTOSCOPY;  Surgeon: Ronel Whittington MD;  [facility];  Service: Urology;  Laterality: N/A;  45 min    CYSTOSCOPY N/A 3/25/2019    Procedure: CYSTOSCOPY;  Surgeon: Ronel Whittington MD;  [facility];  Service: Urology;  Laterality: N/A;  45 min    CYSTOSCOPY N/A 8/26/2019    Procedure: CYSTOSCOPY;  Surgeon: Ronel Whittington MD;  [facility];  Service: Urology;  Laterality: N/A;  45 min    CYSTOSCOPY N/A 7/2/2021    Procedure: CYSTOSCOPY;  Surgeon: Ronel Whittington MD;  [facility];  Service: Urology;  Laterality: N/A;    CYSTOSCOPY  12/23/2021    Procedure: CYSTOSCOPY;  Surgeon: Ronel Whittington MD;  [facility];  Service: Urology;;    CYSTOSCOPY  2/18/2022    Procedure: CYSTOSCOPY;  Surgeon: Ronel Whittington MD;  Location: [facility] OR [address] [place] [address];  Service: Urology;;    CYSTOSCOPY W/ URETERAL STENT PLACEMENT Left 5/15/2021     Procedure: CYSTOSCOPY, WITH URETERAL STENT INSERTION;  Surgeon: Levy Sánchez Jr., MD;  Location: [address];  Service: Urology;  Laterality: Left;    CYSTOSCOPY WITH BIOPSY OF BLADDER N/A 1/27/2020    Procedure: CYSTOSCOPY, WITH BLADDER BIOPSY, WITH FULGURATION IF INDICATED;  Surgeon: Ronel Whittington MD;  Location: [address];  Service: Urology;  Laterality: N/A;    DILATION AND CURETTAGE OF UTERUS      FISTULOGRAM N/A 4/29/2024    Procedure: Fistulogram;  Surgeon: RODRIGO Friedman III, MD;  Location: [facility] CATH LAB;  Service: Peripheral Vascular;  Laterality: N/A;    GALLBLADDER SURGERY  2006    HYSTERECTOMY      INJECTION FOR SENTINEL NODE IDENTIFICATION Left 8/1/2019    Procedure: INJECTION, FOR SENTINEL NODE IDENTIFICATION;  Surgeon: Samia Fulton MD;  Location: [address];  Service: General;  Laterality: Left;    INJECTION OF BOTULINUM TOXIN TYPE A N/A 10/8/2018    Procedure: INJECTION, BOTULINUM TOXIN, TYPE A 300 UNITS;  Surgeon: Ronel Whittington MD;  Location: [address];  Service: Urology;  Laterality: N/A;    INJECTION OF BOTULINUM TOXIN TYPE A N/A 3/25/2019    Procedure: INJECTION, BOTULINUM TOXIN, TYPE A 300 UNITS;  Surgeon: Ronel Whittington MD;  Location: [address];  Service: Urology;  Laterality: N/A;    INJECTION OF BOTULINUM TOXIN TYPE A N/A 8/26/2019    Procedure: INJECTION, BOTULINUM TOXIN, TYPE A 300 UNITS;  Surgeon: Ronel Whittington MD;  Location: [address];  Service: Urology;  Laterality: N/A;    MASTECTOMY Left 8/1/2019    Procedure: MASTECTOMY 23 hour stay;  Surgeon: Samia Fulton MD;  Location: [address];  Service: General;  Laterality: Left;    MEDIPORT REMOVAL Right 6/24/2022    Procedure: REMOVAL, CATHETER, CENTRAL VENOUS, TUNNELED, WITH PORT;  Surgeon: Isauro Anderson MD;  Location: [facility] CATH LAB;  Service: Radiology;  Laterality: Right;    OVARIAN CYST SURGERY  1985    PERCUTANEOUS TRANSLUMINAL ANGIOPLASTY OF  ARTERIOVENOUS FISTULA Left 4/29/2024    Procedure: PTA, AV FISTULA;  Surgeon: RODRIGO Friedman III, MD;  Location: [facility] CATH LAB;  Service: Peripheral Vascular;  Laterality: Left;    REPLACEMENT OF STENT Left 12/23/2021    Procedure: REPLACEMENT, STENT;  Surgeon: Ronel Whittington MD;  Location: [facility] OR [facility] FLR;  Service: Urology;  Laterality: Left;    REPLACEMENT OF STENT Left 2/18/2022    Procedure: REPLACEMENT, STENT;  Surgeon: Ronel Whittington MD;  Location: [facility] OR 1ST FLR;  Service: Urology;  Laterality: Left;    RETROGRADE PYELOGRAPHY Left 2/18/2022    Procedure: PYELOGRAM, RETROGRADE;  Surgeon: Ronel Whittington MD;  Location: [facility] OR [facility] FLR;  Service: Urology;  Laterality: Left;    SENTINEL LYMPH NODE BIOPSY Left 8/1/2019    Procedure: BIOPSY, LYMPH NODE, SENTINEL;  Surgeon: Samia Fulton MD;  [facility];  Service: General;  Laterality: Left;    spt placement      TONSILLECTOMY, ADENOIDECTOMY      TOTAL ABDOMINAL HYSTERECTOMY W/ BILATERAL SALPINGOOPHORECTOMY  1985    URETEROSCOPY Left 2/18/2022    Procedure: URETEROSCOPY;  Surgeon: Ronel Whittington MD;  [facility];  Service: Urology;  Laterality: Left;        Sedation History:    Denies any adverse reactions.  Denies problems laying flat.    Social History:  Social History     Tobacco Use    Smoking status: Never    Smokeless tobacco: Never   Substance Use Topics    Alcohol use: No     Alcohol/week: 0.0 standard drinks of alcohol    Drug use: No        Home Medications:   Prior to Admission medications    Medication Sig Start Date End Date Taking? Authorizing Provider   ELIQUIS 5 mg Tab TAKE 1 TABLET BY MOUTH TWICE A DAY 12/17/24  Yes Catherine Mccartney NP   acetaminophen (TYLENOL) 325 MG tablet Take 2 tablets (650 mg total) by mouth every 6 (six) hours as needed for Pain. 9/19/24   Beltran Piña MD   amLODIPine (NORVASC) 10 MG tablet Take 10 mg by mouth. 4/19/24   Provider, Historical   anastrozole  "(ARIMIDEX) 1 mg Tab TAKE 1 TABLET BY MOUTH EVERY DAY 10/16/24   Remy Keating MD   atorvastatin (LIPITOR) 80 MG tablet Take 1 tablet (80 mg total) by mouth once daily. 4/17/24 4/17/25  Lurdes Navarro MD   BD INSULIN SYRINGE ULTRA-FINE 0.5 mL 31 gauge x 5/16" Syrg USE WITH INSULIN 4 TIMES A DAY 10/8/20   Lurdes Navarro MD   calcium acetate,phosphat bind, (PHOSLO) 667 mg capsule Take 667 mg by mouth 3 (three) times daily with meals.    Provider, Historical   carvediloL (COREG) 12.5 MG tablet Take 1 tablet (12.5 mg total) by mouth 2 (two) times daily. 6/15/24 6/15/25  Johanna Alberto PA-C   diclofenac sodium (VOLTAREN) 1 % Gel Apply 2 g topically once daily.    Provider, Historical   DULoxetine (CYMBALTA) 20 MG capsule Take 2 capsules (40 mg total) by mouth once daily. 8/19/24   Tesha Stafford MD   erenumab-aooe (AIMOVIG AUTOINJECTOR) 140 mg/mL autoinjector 140 mg MONTHLY (route: subcutaneous) 2/27/24   Enrique Henao MD   ergocalciferol (ERGOCALCIFEROL) 50,000 unit Cap Take 1 capsule (50,000 Units total) by mouth every 7 days. 6/10/24   Brooke Gabriel APRN, JOCELINEP   furosemide (LASIX) 40 MG tablet Take 1 tablet (40 mg total) by mouth once daily. 8/12/23 11/1/24  Marge Jennings MD   HYDROcodone-acetaminophen (NORCO)  mg per tablet Take 1 tablet by mouth every 6 (six) hours as needed for Pain. 12/29/24 1/28/25  Tesha Stafford MD   ipratropium (ATROVENT) 21 mcg (0.03 %) nasal spray 2 sprays by Each Nostril route 2 (two) times daily. 12/5/24   Luzma-Watson, Rylee A., FNP   lactulose (CHRONULAC) 20 gram/30 mL Soln Take 30 mLs (20 g total) by mouth every 12 (twelve) hours as needed (for constipation). 12/16/24 3/16/25  Emmanuel Hare Jr., MD LANTUS SOLOSTAR U-100 INSULIN 100 unit/mL (3 mL) InPn pen INJECT 12-15 UNITS UNDER THE SKIN at night 6/9/24   Brooke Gabriel, APRN, FNP   losartan (COZAAR) 100 MG tablet Take 1 tablet (100 mg total) by " "mouth once daily. 3/18/24 3/18/25  Emmanuel Hare Jr., MD   methocarbamoL (ROBAXIN) 750 MG Tab TAKE 1 TO 2 TABLETS(750 TO 1500 MG) BY MOUTH THREE TIMES DAILY AS NEEDED FOR MUSCLE SPASMS 12/29/24   Tesha Stafford MD   ondansetron (ZOFRAN-ODT) 8 MG TbDL 8 mg EVERY 12 HOURS (route: oral) 4/18/24   Provider, Darrell   oxybutynin (DITROPAN-XL) 10 MG 24 hr tablet TAKE 1 TABLET BY MOUTH EVERY DAY 12/1/23   Brooke Gabriel APRN, PRUDENCE   oxyCODONE (ROXICODONE) 5 MG immediate release tablet Take 1 tablet (5 mg total) by mouth daily as needed (severe pain). 12/20/24 1/19/25  Tesha Stafford MD   patiromer calcium sorbitex (VELTASSA) 16.8 gram PwPk Take 1 packet (16.8 g total) by mouth once daily. for 90 doses 10/30/24 1/28/25  Emmanuel Hare Jr., MD   pen needle, diabetic (BD ULTRA-FINE SHORT PEN NEEDLE) 31 gauge x 5/16" Ndle USE WITH LANTUS DAILY, e 11.65 1/31/23   Brooke Gabriel APRN, JOCELINEP   silver sulfADIAZINE 1% (SILVADENE) 1 % cream Apply topically Every 3 (three) days. 11/14/24   Catherine Mccartney, NP   sodium bicarbonate 650 MG tablet Take 1 tablet (650 mg total) by mouth once daily. 6/14/24 6/14/25  Johanna Alberto PA-C   sumatriptan (IMITREX) 100 MG tablet Take 1 tablet (100 mg total) by mouth every 2 (two) hours as needed for Migraine (Limit 2 in 14 hours and 9 in one month). 8/22/24   Enrique Henao MD   SYNTHROID 50 mcg tablet TAKE 1 TABLET BY MOUTH BEFORE BREAKFAST. ADMINISTER ON AN EMPTY STOMACH AT LEAST 30 MINUTES BEFORE FOOD. IF RECEIVING TUBE FEEDS, HOLD TUBE FEEDS FOR 1 HOUR BEFORE AND AFTER LEVOTHYROXINE ADMINISTRATION. 9/23/24   Gabriel, Irielle L., APRN, FNP   traZODone (DESYREL) 100 MG tablet TAKE 1 TABLET BY MOUTH NIGHTLY AS NEEDED FOR INSOMNIA. 8/22/24   Lurdes Navarro MD   UNABLE TO FIND medication name: Tylenol Migraine- APAP, ASA, caffeine    Provider, Historical   VELPHORO 500 mg Chew CHEW INSERT TABLET 5 TIMES DAILY WITH MEAL AND SNACK 5/3/24   " Provider, Historical       Inpatient Medications:    Current Facility-Administered Medications:     acetaminophen tablet 650 mg, 650 mg, Oral, Q4H PRJENNY, Fariha Abraham MD, 650 mg at 01/01/25 1316    amLODIPine tablet 10 mg, 10 mg, Oral, Daily, Cole Leggett MD, 10 mg at 01/06/25 0842    anastrozole tablet 1 mg, 1 mg, Oral, Daily, Fariha Abraham MD, 1 mg at 01/06/25 0842    apixaban tablet 5 mg, 5 mg, Oral, BID, Fariha Abraham MD, 5 mg at 01/06/25 0842    atorvastatin tablet 80 mg, 80 mg, Oral, Daily, Fariha Abraham MD, 80 mg at 01/06/25 0842    carvediloL tablet 12.5 mg, 12.5 mg, Oral, BID, Fariha Abraham MD, 12.5 mg at 01/06/25 0842    dextrose 50% injection 12.5 g, 12.5 g, Intravenous, PRN, Fariha Abraham MD    dextrose 50% injection 12.5 g, 12.5 g, Intravenous, PRN, Marcela Vicente MD    dextrose 50% injection 25 g, 25 g, Intravenous, PRN, Fariha Abraham MD    dextrose 50% injection 25 g, 25 g, Intravenous, PRN, Marcela Vicente MD    DULoxetine DR capsule 40 mg, 40 mg, Oral, Daily, Fariha Abraham MD, 40 mg at 01/06/25 0950    epoetin chloe-epbx injection 4,900 Units, 50 Units/kg, Intravenous, Every Mon, Wed, Fri, Mayra Porter, DNP, FNP-C    glucagon (human recombinant) injection 1 mg, 1 mg, Intramuscular, PRN, Fariha Abraham MD    glucagon (human recombinant) injection 1 mg, 1 mg, Intramuscular, PRN, Marcela Vicente MD    glucose chewable tablet 16 g, 16 g, Oral, PRN, Fariha Abraham MD    glucose chewable tablet 16 g, 16 g, Oral, PRN, Marcela Vicente MD    glucose chewable tablet 24 g, 24 g, Oral, PRN, Fariha Abraham MD    glucose chewable tablet 24 g, 24 g, Oral, PRN, Marcela Vicente MD    heparin (porcine) injection 1,000 Units, 1,000 Units, Intra-Catheter, PRN, Jordyn Herrera PA-C, 1,000 Units at 01/04/25 1141    HYDROcodone-acetaminophen  mg per tablet 1 tablet, 1 tablet, Oral, Q4H PRN, Fariha Abraham MD, 1 tablet  at 01/06/25 0953    insulin aspart U-100 pen 1-10 Units, 1-10 Units, Subcutaneous, QID (AC + HS) PRN, Marcela Vicente MD, 4 Units at 01/06/25 1226    ipratropium 42 mcg (0.06 %) nasal spray 2 spray, 2 spray, Each Nostril, BID, Fariha Abraham MD, 2 spray at 01/06/25 0845    levothyroxine tablet 50 mcg, 50 mcg, Oral, Before breakfast, Fariha Abraham MD, 50 mcg at 01/06/25 0528    losartan tablet 100 mg, 100 mg, Oral, Daily, Fariha Abraham MD, 100 mg at 01/06/25 0842    methocarbamoL tablet 750 mg, 750 mg, Oral, TID PRN, Fariha Abraham MD, 750 mg at 01/06/25 0841    mupirocin 2 % ointment, , Nasal, BID, Hetal Aguilar PA-C, Given at 01/06/25 0844    naloxone 0.4 mg/mL injection 0.02 mg, 0.02 mg, Intravenous, PRN, Fariha Abraham MD    ondansetron injection 8 mg, 8 mg, Intravenous, Q6H PRN, Fariha Abraham MD, 8 mg at 01/04/25 1635    oxybutynin 24 hr tablet 10 mg, 10 mg, Oral, Daily, Fariha Abraham MD, 10 mg at 01/06/25 0842    polyethylene glycol packet 17 g, 17 g, Oral, Daily PRN, Fariha Abraham MD    sevelamer carbonate tablet 800 mg, 800 mg, Oral, TID WM, Mayra Porter DNP, FNP-C    sodium bicarbonate tablet 650 mg, 650 mg, Oral, Daily, Fariha Abraham MD, 650 mg at 01/06/25 0842    sodium chloride 0.9% flush 5 mL, 5 mL, Intravenous, PRN, Fariha Abraham MD    traZODone tablet 100 mg, 100 mg, Oral, Nightly PRN, Fariha Abraham MD, 100 mg at 01/05/25 2010    vancomycin (VANCOCIN) 500 mg in D5W 100 mL IVPB (MB+), 500 mg, Intravenous, Once, Marcela Vicente MD    Pharmacy to dose Vancomycin consult, , , Once **AND** vancomycin - pharmacy to dose, , Intravenous, pharmacy to manage frequency, Cole Leggett MD    Facility-Administered Medications Ordered in Other Encounters:     epoetin chloe injection 2,000 Units, 2,000 Units, Intravenous, 1 time in Clinic/HOD, Emmanuel Hare Jr., MD    heparin (porcine) injection 2,000 Units, 2,000 Units, Intravenous, Once,  Emmanuel Hare Jr., MD    heparin (porcine) injection 2,000 Units, 2,000 Units, Intravenous, Once, Emmanuel Hare Jr., MD    heparin (porcine) injection 4,000 Units, 4,000 Units, Intra-Catheter, 1 time in Clinic/HOD, Emmanuel Hare Jr., MD    iron sucrose injection 100 mg, 100 mg, Intravenous, 1 time in Clinic/HOD, Emmanuel Hare Jr., MD     Anticoagulants/Antiplatelets:   Eliquis    Allergies:   Review of patient's allergies indicates:  No Known Allergies    Review of Systems:   As documented in primary provider H&P.    Vital Signs (Most Recent):  Temp: 98.4 °F (36.9 °C) (01/06/25 1119)  Pulse: 79 (01/06/25 1545)  Resp: 18 (01/06/25 1119)  BP: 137/65 (01/06/25 1530)  SpO2: 95 % (01/06/25 1119)    Physical Exam:  No acute distress, laying comfortably in bed, pleasant and cooperative  Regular rate and rhythm  Breathing unlabored  Abdomen benign  Extremities warm and well perfused  R chest CVC    Sedation Exam:  ASA: II - Patient appears to have mild systemic disease, adequately controlled   Mallampati: III (soft and hard palate and base of uvula visible)     Laboratory:  Lab Results   Component Value Date    INR 1.0 12/31/2024       Lab Results   Component Value Date    WBC 7.80 01/06/2025    HGB 9.4 (L) 01/06/2025    HCT 30.1 (L) 01/06/2025    MCV 96 01/06/2025     01/06/2025      Lab Results   Component Value Date     (H) 01/06/2025     (L) 01/06/2025    K 4.2 01/06/2025    CL 94 (L) 01/06/2025    CO2 22 (L) 01/06/2025    BUN 35 (H) 01/06/2025    CREATININE 2.9 (H) 01/06/2025    CALCIUM 8.0 (L) 01/06/2025    MG 1.6 01/06/2025    ALT 15 01/06/2025    AST 15 01/06/2025    ALBUMIN 2.4 (L) 01/06/2025    BILITOT 0.3 01/06/2025    BILIDIR 0.1 01/18/2024       Imaging:  Reviewed.      ASSESSMENT/PLAN:   Cecile Bowen is a 74 y.o. female with ESRD on HD, T2DM, afib on Eliquis, HTN, obesity, history of breast cancer, chronic suprapubic catheter for neurogenic bladder and chronic  back pain who was admitted for sepsis 2/2 enterococcus with likely source her tunneled CVC. IR has been consulted for line removal.    -Plan to proceed with line removal tomorrow, 1/7  -AC reviewed  -Pertinent labs and imaging reviewed                      Sedation Plan: local with lidocaine      Ki Zavala MD  R[facility]

## 2025-01-06 NOTE — ASSESSMENT & PLAN NOTE
Patient has paroxysmal (<7 days) atrial fibrillation. Patient is currently in sinus rhythm. [id number] Score: 3. The patients heart rate in the last 24 hours is as follows:  Pulse  Min: 66  Max: 80     Antiarrhythmics       Anticoagulants  apixaban tablet 5 mg, 2 times daily, Oral  heparin (porcine) injection 1,000 Units, As needed (PRN), Intra-Catheter    Plan  - Replete lytes with a goal of K>4, Mg >2  - Patient is anticoagulated, see medications listed above.  - Patient's afib is currently controlled  - Previously had afib, and on Eliquis once a day because of bruising?  As she is being monitored will put on appropriate BID dosing

## 2025-01-06 NOTE — OP NOTE
[facility] - Cath Lab  Operative Note     SUMMARY      Surgery Date: 1/6/2025      Surgeons and Role:     * RODRIGO Friedman III, MD - Primary     Assisting Surgeon: None     Pre-op Diagnosis:  ESRD (end stage renal disease) on dialysis [N18.6, Z99.2]     Post-op Diagnosis:  Post-Op Diagnosis Codes:     * ESRD (end stage renal disease) on dialysis [N18.6, Z99.2]     Procedure(s) (LRB):  Fistulogram (Left)  Conscious Sedation     Anesthesia: RN IV Sedation     Implants:  * No implants in log *     Operative Findings: well matured AVF, no stenosis, good flow     Estimated Blood Loss: <3cc               Specimens:   Specimen (24h ago, onward)        None     Indications:  74-year-old the female with a AV fistula which has been difficult to cannulate on the venous end.  Duplex ultrasound does not suggest outflow stenosis.      Procedure: Patient brought to cath lab placed in supine position.  After sterile prep and drape and infiltration with 1% lidocaine, the left brachiocephalic AV fistula was accessed with a micropuncture set and fistulogram performed.  This demonstrated a uniformly well matured fistula with no stenosis no thrombus no central venous stenosis.    A 4-0 Monocryl was used to obtain hemostasis of the micropuncture site sterile dressing was applied the patient was taken to recovery room in stable condition    MODERATE SEDATION IN CATH LAB   I was present for moderate sedation, and monitored the patients cardio respiratory functions during the moderate sedation   See nurses notes for Intra-service start and end times and for the log of the name of the RN who administered the medicines for the moderate sedation, including their doseage and route.     Time of sedation:  15 minutes     WALE Friedman III, MD, FACS  Professor and Chief, Vascular and Endovascular Surgery

## 2025-01-06 NOTE — SUBJECTIVE & OBJECTIVE
Interval History:     Review of Systems  Objective:     Vital Signs (Most Recent):  Temp: 98.4 °F (36.9 °C) (01/06/25 1119)  Pulse: 71 (01/06/25 1119)  Resp: 18 (01/06/25 1119)  BP: 123/68 (01/06/25 1119)  SpO2: 95 % (01/06/25 1119) Vital Signs (24h Range):  Temp:  [97.6 °F (36.4 °C)-98.7 °F (37.1 °C)] 98.4 °F (36.9 °C)  Pulse:  [66-80] 71  Resp:  [17-18] 18  SpO2:  [94 %-99 %] 95 %  BP: (123-169)/(68-75) 123/68     Weight: 97.1 kg (214 lb)  Body mass index is 32.54 kg/m².    Intake/Output Summary (Last 24 hours) at 1/6/2025 1310  Last data filed at 1/6/2025 1229  Gross per 24 hour   Intake 480 ml   Output 700 ml   Net -220 ml      Physical Exam  Constitutional:       General: She is not in acute distress.  Cardiovascular:      Rate and Rhythm: Normal rate.      Pulses: Normal pulses.   Pulmonary:      Effort: No respiratory distress.      Breath sounds: Normal breath sounds. No wheezing.   Abdominal:      General: Bowel sounds are normal. There is no distension.      Palpations: Abdomen is soft.      Tenderness: There is no abdominal tenderness.   Musculoskeletal:      Right lower leg: No edema.      Left lower leg: No edema.   Neurological:      Mental Status: She is alert. Mental status is at baseline.         MELD 3.0: 25 at 1/2/2025  5:26 AM  MELD-Na: 25 at 1/2/2025  5:26 AM  Calculated from:  Serum Creatinine: On dialysis. Using the maximum value.  Serum Sodium: 130 mmol/L at 1/2/2025  5:26 AM  Total Bilirubin: 0.2 mg/dL (Using min of 1 mg/dL) at 1/2/2025  5:26 AM  Serum Albumin: 2.3 g/dL at 1/2/2025  5:26 AM  INR(ratio): 1 at 12/31/2024  9:37 AM  Age at listing (hypothetical): 74 years  Sex: Female at 1/2/2025  5:26 AM      Significant Labs:  CBC:  Recent Labs   Lab 01/05/25  0511 01/06/25 0439   WBC 8.49 7.80   HGB 9.1* 9.4*   HCT 28.2* 30.1*    249     CMP:  Recent Labs   Lab 01/06/25 0439   *   K 4.2   CL 94*   CO2 22*   *   BUN 35*   CREATININE 2.9*   CALCIUM 8.0*   PROT 6.1  "  ALBUMIN 2.4*   BILITOT 0.3   ALKPHOS 78   AST 15   ALT 15   ANIONGAP 9     PTINR:  No results for input(s): "INR" in the last 48 hours.      "

## 2025-01-06 NOTE — ASSESSMENT & PLAN NOTE
Creatine stable for now. BMP reviewed- noted Estimated Creatinine Clearance: 21.4 mL/min (A) (based on SCr of 2.9 mg/dL (H)). according to latest data. Based on current GFR, CKD stage is end stage.  Monitor UOP and serial BMP and adjust therapy as needed. Renally dose meds. Avoid nephrotoxic medications and procedures.    Nephro consult  Has been getting HD MF via right IJ PermCath  Needs f/u with Vascular Surgery for IJ removal  Vascular Surgery consulted for problem cannulating HD access   s/p fistulogram 1/6 with good flow. Pending recs on permcath removal

## 2025-01-06 NOTE — PLAN OF CARE
Problem: Adult Inpatient Plan of Care  Goal: Plan of Care Review  Outcome: Progressing  Goal: Patient-Specific Goal (Individualized)  Outcome: Progressing  Goal: Absence of Hospital-Acquired Illness or Injury  Outcome: Progressing  Goal: Optimal Comfort and Wellbeing  Outcome: Progressing  Goal: Readiness for Transition of Care  Outcome: Progressing     Problem: Skin Injury Risk Increased  Goal: Skin Health and Integrity  Outcome: Progressing     Problem: Sepsis/Septic Shock  Goal: Optimal Coping  Outcome: Progressing  Goal: Optimal Nutrition Intake  Outcome: Progressing     Problem: Wound  Goal: Optimal Functional Ability  Outcome: Progressing  Goal: Absence of Infection Signs and Symptoms  Outcome: Progressing    Pt lying supine in asleep. Awakens on voice. Pt AAOX4 in no apparent distress. Pain managed with Prn pain meds. Bed locked, in lowest position, call light in reach.

## 2025-01-06 NOTE — PROGRESS NOTES
Pharmacokinetic Assessment Follow Up: IV Vancomycin    Vancomycin serum concentration assessment(s)/Regimen Plan:    The random level was drawn correctly and can be used to guide therapy at this time.   Vancomycin random is above the desired definitive target range of 15 to 20 mcg/mL.  HD scheduled for 1/6  Will order Vancomycin 500 mg IV x1 to be given after HD   Vancomycin random to be drawn with AM labs 1/8    Drug levels (last 3 results):  Recent Labs   Lab Result Units 01/06/25  0439   Vancomycin, Random ug/mL 21.7       Pharmacy will continue to follow and monitor vancomycin.    Please contact pharmacy at extension [phone] for questions regarding this assessment.    Thank you for the consult,   Melida Roche       Patient brief summary:  Cecile Bowen is a 74 y.o. female initiated on antimicrobial therapy with IV Vancomycin for treatment of bacteremia    The patient's current regimen is pulse dose    Drug Allergies:   Review of patient's allergies indicates:  No Known Allergies    Actual Body Weight:   97.1 kg    Renal Function:   Estimated Creatinine Clearance: 21.4 mL/min (A) (based on SCr of 2.9 mg/dL (H)).,     Dialysis Method (if applicable):  intermittent HD    CBC (last 72 hours):  Recent Labs   Lab Result Units 01/05/25  0511   WBC K/uL 8.49   Hemoglobin g/dL 9.1*   Hematocrit % 28.2*   Platelets K/uL 229       Metabolic Panel (last 72 hours):  Recent Labs   Lab Result Units 01/04/25  0839 01/06/25  0439   Sodium mmol/L 125* 125*   Potassium mmol/L 4.2 4.2   Chloride mmol/L 94* 94*   CO2 mmol/L 18* 22*   Glucose mg/dL 150* 133*   BUN mg/dL 46* 35*   Creatinine mg/dL 3.2* 2.9*   Albumin g/dL 2.3* 2.4*   Total Bilirubin mg/dL  --  0.3   Alkaline Phosphatase U/L  --  78   AST U/L  --  15   ALT U/L  --  15   Magnesium mg/dL  --  1.6   Phosphorus mg/dL 5.8* 5.6*       Vancomycin Administrations:  vancomycin given in the last 96 hours                     vancomycin 1,250 mg in 0.9% NaCl 250 mL IVPB  (admixture device) (mg) 1,250 mg New Bag 01/04/25 2314                    Microbiologic Results:  Microbiology Results (last 7 days)       Procedure Component Value Units Date/Time    Urine culture [[id number]]  (Abnormal)  (Susceptibility) Collected: 12/31/24 1049    Order Status: Completed Specimen: Urine Updated: 01/05/25 1445     Urine Culture, Routine KLEBSIELLA PNEUMONIAE ESBL  50,000 - 99,999 cfu/ml        PROTEUS MIRABILIS  10,000 - 49,999 cfu/ml      Narrative:      Specimen Source->Urine    Blood culture [[id number]] Collected: 01/02/25 0526    Order Status: Completed Specimen: Blood from Peripheral, Antecubital, Right Updated: 01/05/25 0812     Blood Culture, Routine No Growth to date      No Growth to date      No Growth to date      No Growth to date    Blood culture [[id number]] Collected: 01/02/25 0529    Order Status: Completed Specimen: Blood from Peripheral, Hand, Right Updated: 01/05/25 0812     Blood Culture, Routine No Growth to date      No Growth to date      No Growth to date      No Growth to date    Blood culture x two cultures. Draw prior to antibiotics. [[id number]]  (Abnormal) Collected: 12/31/24 0937    Order Status: Completed Specimen: Blood from Peripheral, Antecubital, Right Updated: 01/03/25 1431     Blood Culture, Routine Gram stain aer bottle: Gram positive cocci in chains resembling Strep      Gram stain zelda bottle: Gram positive cocci in chains resembling Strep      Results called to and read back by: Sean Hernandez RN  01/01/2025  10:31      ENTEROCOCCUS FAECALIS  For susceptibility see order #[id number]      Narrative:      Aerobic and anaerobic    Blood culture x two cultures. Draw prior to antibiotics. [[id number]]  (Abnormal)  (Susceptibility) Collected: 12/31/24 0937    Order Status: Completed Specimen: Blood from Peripheral, Antecubital, Right Updated: 01/03/25 1429     Blood Culture, Routine Gram stain aer bottle: Gram positive cocci in chains resembling Strep      Gram  stain zelda bottle: Gram positive cocci in chains resembling Strep      Results called to and read back by: Sean Hernandez RN  01/01/2025  10:31      ENTEROCOCCUS FAECALIS    Narrative:      Aerobic and anaerobic    Rapid Organism ID by PCR (from Blood culture) [[id number]]  (Abnormal) Collected: 12/31/24 0937    Order Status: Completed Updated: 01/01/25 1149     Enterococcus faecalis Detected     Enterococcus faecium Not Detected     Listeria monocytogenes Not Detected     Staphylococcus spp. Not Detected     Staphylococcus aureus Not Detected     Staphylococcus epidermidis Not Detected     Staphylococcus lugdunensis Not Detected     Streptococcus species Not Detected     Streptococcus agalactiae Not Detected     Streptococcus pneumoniae Not Detected     Streptococcus pyogenes Not Detected     Acinetobacter calcoaceticus/baumannii complex Not Detected     Bacteroides fragilis Not Detected     Enterobacterales Not Detected     Enterobacter cloacae complex Not Detected     Escherichia coli Not Detected     Klebsiella aerogenes Not Detected     Klebsiella oxytoca Not Detected     Klebsiella pneumoniae group Not Detected     Proteus Not Detected     Salmonella sp Not Detected     Serratia marcescens Not Detected     Haemophilus influenzae Not Detected     Neisseria meningtidis Not Detected     Pseudomonas aeruginosa Not Detected     Stenotrophomonas maltophilia Not Detected     Candida albicans Not Detected     Candida auris Not Detected     Candida glabrata Not Detected     Candida krusei Not Detected     Candida parapsilosis Not Detected     Candida tropicalis Not Detected     Cryptococcus neoformans/gattii Not Detected     CTX-M (ESBL ) Test Not Applicable     IMP (Carbapenem resistant) Test Not Applicable     KPC resistance gene (Carbapenem resistant) Test Not Applicable     mcr-1  Test Not Applicable     mec A/C  Test Not Applicable     mec A/C and MREJ (MRSA) gene Test Not Applicable     NDM (Carbapenem  resistant) Test Not Applicable     OXA-48-like (Carbapenem resistant) Test Not Applicable     van A/B (VRE gene) Not Detected     VIM (Carbapenem resistant) Test Not Applicable    Narrative:      Aerobic and anaerobic    Urine Culture High Risk [[id number]]     Order Status: Completed Specimen: Urine, Catheterized

## 2025-01-06 NOTE — ASSESSMENT & PLAN NOTE
Anemia is likely due to chronic disease due to ESRD. Most recent hemoglobin and hematocrit are listed below.  Recent Labs     01/05/25  0511 01/06/25  0439   HGB 9.1* 9.4*   HCT 28.2* 30.1*       Plan  - Monitor serial CBC: Daily  - Transfuse PRBC if patient becomes hemodynamically unstable, symptomatic or H/H drops below 7/21.  - Patient has not received any PRBC transfusions to date

## 2025-01-06 NOTE — PROGRESS NOTES
[facility]  Vascular Surgery  Progress Note    Patient Name: Cecile Bowen  MRN: [id number]  Admission Date: 12/31/2024  Primary Care Provider: Lurdes Navarro MD    Subjective:     Interval History: 1/6/25: Neuro intact, vascular exam stable. OR today for fistulogram    Post-Op Info:  Procedure(s) (LRB):  PTA, AV FISTULA (Left)         Medications:  Continuous Infusions:  Scheduled Meds:   amLODIPine  10 mg Oral Daily    anastrozole  1 mg Oral Daily    apixaban  5 mg Oral BID    atorvastatin  80 mg Oral Daily    carvediloL  12.5 mg Oral BID    DULoxetine  40 mg Oral Daily    epoetin chloe-ebpx (RETACRIT) injection  50 Units/kg Intravenous Every Mon, Wed, Fri    ipratropium  2 spray Each Nostril BID    levothyroxine  50 mcg Oral Before breakfast    losartan  100 mg Oral Daily    mupirocin   Nasal BID    oxybutynin  10 mg Oral Daily    sodium bicarbonate  650 mg Oral Daily     PRN Meds:  Current Facility-Administered Medications:     acetaminophen, 650 mg, Oral, Q4H PRN    dextrose 50%, 12.5 g, Intravenous, PRN    dextrose 50%, 25 g, Intravenous, PRN    glucagon (human recombinant), 1 mg, Intramuscular, PRN    glucose, 16 g, Oral, PRN    glucose, 24 g, Oral, PRN    heparin (porcine), 1,000 Units, Intra-Catheter, PRN    HYDROcodone-acetaminophen, 1 tablet, Oral, Q4H PRN    methocarbamoL, 750 mg, Oral, TID PRN    naloxone, 0.02 mg, Intravenous, PRN    ondansetron, 8 mg, Intravenous, Q6H PRN    polyethylene glycol, 17 g, Oral, Daily PRN    sodium chloride 0.9%, 5 mL, Intravenous, PRN    traZODone, 100 mg, Oral, Nightly PRN    Pharmacy to dose Vancomycin consult, , , Once **AND** vancomycin - pharmacy to dose, , Intravenous, pharmacy to manage frequency     Objective:     Vital Signs (Most Recent):  Temp: 97.6 °F (36.4 °C) (01/06/25 0521)  Pulse: 77 (01/06/25 0521)  Resp: 18 (01/06/25 0542)  BP: (!) 151/72 (01/06/25 0521)  SpO2: (!) 94 % (01/06/25 0521) Vital Signs (24h  Range):  Temp:  [97.6 °F (36.4 °C)-98.8 °F (37.1 °C)] 97.6 °F (36.4 °C)  Pulse:  [66-91] 77  Resp:  [18] 18  SpO2:  [94 %-99 %] 94 %  BP: (146-169)/(66-79) 151/72          Physical Exam  Constitutional:       General: She is not in acute distress.     Appearance: Normal appearance. She is not ill-appearing.   HENT:      Head: Normocephalic and atraumatic.      Nose: Nose normal.      Mouth/Throat:      Mouth: Mucous membranes are moist.   Eyes:      Extraocular Movements: Extraocular movements intact.      Conjunctiva/sclera: Conjunctivae normal.   Cardiovascular:      Rate and Rhythm: Normal rate and regular rhythm.      Comments: Palpable thrill in LUE AVF  Pulmonary:      Effort: Pulmonary effort is normal. No respiratory distress.   Abdominal:      General: There is no distension.      Palpations: Abdomen is soft.   Musculoskeletal:      Cervical back: Normal range of motion.   Skin:     General: Skin is warm and dry.   Neurological:      General: No focal deficit present.      Mental Status: She is alert and oriented to person, place, and time. Mental status is at baseline.          Significant Labs:  CBC:   Recent Labs   Lab 01/05/25  0511   WBC 8.49   RBC 3.01*   HGB 9.1*   HCT 28.2*      MCV 94   MCH 30.2   MCHC 32.3     CMP:   Recent Labs   Lab 01/04/25  0839   *   CALCIUM 7.7*   ALBUMIN 2.3*   *   K 4.2   CO2 18*   CL 94*   BUN 46*   CREATININE 3.2*       Significant Diagnostics:  I have reviewed all pertinent imaging results/findings within the past 24 hours.  Assessment/Plan:     ESRD (end stage renal disease) on dialysis  75yo female with ESRD on HD 2x/week on Monday and Friday. She has been using the LUE AVG and right IJ permacath for HD but occasionally the LUE graft with clot and not work for HD. She takes eliquis currently.     HD U/S without hemodynamically significant stenosis, small overlying hematoma from apparent infiltration at dialysis. Has continued to have difficulty  cannulating for dialysis.    - OR today for fistulogram  - NPO since MN  - Patient has been consented and marked  - Okay to continue her AC for procedure        Fadia Turpin MD  Vascular Surgery  [facility]

## 2025-01-06 NOTE — NURSING
Patient arrived in bed . Left  upper arm fistula accessed  for for  arterial  line and  HD  catheter used  for  venous  line .   Dialysis treatment  started .   Report received from Primary Nurse .

## 2025-01-06 NOTE — PROGRESS NOTES
[facility] NOTE     Patient currently on hemodialysis for removal of uremic toxins .     Patient seen and evaluated on hemodialysis, tolerating treatment, see HD flowsheet for vitals and assessments.      No Hypotension, chest pain, shortness of breath, cramping, nausea or vomiting.     Following patient for the management of ESRD    Target UF: 2 L as tolerated, keep MAP >65.  Sp fistulogram today; RN unable to cannulate both needles, therefore, using catheter and AVF today (arterial line to AVF, venous line to catheter). Message sent to vascular. Would defer removal of CVC at this time. Will likely need temporary access until patient is able to get another tunneled line once infection clears. ID with recommendations to remove current CVC for source control.   Continue EPO, Hgb 9.4  Phos 5.6, will start binders.   Remain on sodium bicarbonate. Can continue for now.   Consider renal diet restrictions; please limit daily intake to 32 oz per day.   No lab stick/BP intake on access site  Continue to monitor intake and output, daily weights   Please avoid gadolinium, fleets, phos-based laxatives, NSAIDs  Will follow closely and continue dialysis treatments while in-patient    JUAN Porter DNP, APRN, FNP-C  Department of Nephrology  [facility]  Pager: [phone]

## 2025-01-06 NOTE — BRIEF OP NOTE
[facility]  Brief Operative Note    SUMMARY     Surgery Date: 1/6/2025     Surgeons and Role:     * RODRIGO Friedman III, MD - Primary    Assisting Surgeon: None    Pre-op Diagnosis:  ESRD (end stage renal disease) on dialysis [N18.6, Z99.2]    Post-op Diagnosis:  Post-Op Diagnosis Codes:     * ESRD (end stage renal disease) on dialysis [N18.6, Z99.2]    Procedure(s) (LRB):  Fistulogram (Left)  Conscious Sedation    Anesthesia: RN IV Sedation    Implants:  * No implants in log *    Operative Findings: well matured AVF, no stenosis, good flow    Estimated Blood Loss: <3cc             Specimens:   Specimen (24h ago, onward)      None            [id number]

## 2025-01-07 LAB
ALBUMIN SERPL BCP-MCNC: 2.4 G/DL (ref 3.5–5.2)
ALP SERPL-CCNC: 78 U/L (ref 40–150)
ALT SERPL W/O P-5'-P-CCNC: 15 U/L (ref 10–44)
ANION GAP SERPL CALC-SCNC: 8 MMOL/L (ref 8–16)
AST SERPL-CCNC: 14 U/L (ref 10–40)
BACTERIA BLD CULT: NORMAL
BACTERIA BLD CULT: NORMAL
BASOPHILS # BLD AUTO: 0.04 K/UL (ref 0–0.2)
BASOPHILS NFR BLD: 0.6 % (ref 0–1.9)
BILIRUB SERPL-MCNC: 0.2 MG/DL (ref 0.1–1)
BUN SERPL-MCNC: 19 MG/DL (ref 8–23)
CALCIUM SERPL-MCNC: 8.3 MG/DL (ref 8.7–10.5)
CHLORIDE SERPL-SCNC: 97 MMOL/L (ref 95–110)
CO2 SERPL-SCNC: 22 MMOL/L (ref 23–29)
CREAT SERPL-MCNC: 2 MG/DL (ref 0.5–1.4)
DIFFERENTIAL METHOD BLD: ABNORMAL
EOSINOPHIL # BLD AUTO: 0.3 K/UL (ref 0–0.5)
EOSINOPHIL NFR BLD: 3.9 % (ref 0–8)
ERYTHROCYTE [DISTWIDTH] IN BLOOD BY AUTOMATED COUNT: 12.9 % (ref 11.5–14.5)
EST. GFR  (NO RACE VARIABLE): 26.1 ML/MIN/1.73 M^2
GLUCOSE SERPL-MCNC: 124 MG/DL (ref 70–110)
HCT VFR BLD AUTO: 28.9 % (ref 37–48.5)
HGB BLD-MCNC: 9.2 G/DL (ref 12–16)
IMM GRANULOCYTES # BLD AUTO: 0.09 K/UL (ref 0–0.04)
IMM GRANULOCYTES NFR BLD AUTO: 1.3 % (ref 0–0.5)
LYMPHOCYTES # BLD AUTO: 1.6 K/UL (ref 1–4.8)
LYMPHOCYTES NFR BLD: 21.8 % (ref 18–48)
MAGNESIUM SERPL-MCNC: 1.6 MG/DL (ref 1.6–2.6)
MCH RBC QN AUTO: 29.6 PG (ref 27–31)
MCHC RBC AUTO-ENTMCNC: 31.8 G/DL (ref 32–36)
MCV RBC AUTO: 93 FL (ref 82–98)
MONOCYTES # BLD AUTO: 1 K/UL (ref 0.3–1)
MONOCYTES NFR BLD: 13.4 % (ref 4–15)
NEUTROPHILS # BLD AUTO: 4.2 K/UL (ref 1.8–7.7)
NEUTROPHILS NFR BLD: 59 % (ref 38–73)
NRBC BLD-RTO: 0 /100 WBC
PHOSPHATE SERPL-MCNC: 4.2 MG/DL (ref 2.7–4.5)
PLATELET # BLD AUTO: 227 K/UL (ref 150–450)
PMV BLD AUTO: 9.1 FL (ref 9.2–12.9)
POCT GLUCOSE: 147 MG/DL (ref 70–110)
POCT GLUCOSE: 201 MG/DL (ref 70–110)
POCT GLUCOSE: 207 MG/DL (ref 70–110)
POCT GLUCOSE: 213 MG/DL (ref 70–110)
POTASSIUM SERPL-SCNC: 3.9 MMOL/L (ref 3.5–5.1)
PROT SERPL-MCNC: 5.9 G/DL (ref 6–8.4)
RBC # BLD AUTO: 3.11 M/UL (ref 4–5.4)
SODIUM SERPL-SCNC: 127 MMOL/L (ref 136–145)
WBC # BLD AUTO: 7.15 K/UL (ref 3.9–12.7)

## 2025-01-07 PROCEDURE — 80053 COMPREHEN METABOLIC PANEL: CPT | Mod: HCNC

## 2025-01-07 PROCEDURE — 27000207 HC ISOLATION: Mod: HCNC

## 2025-01-07 PROCEDURE — 99232 SBSQ HOSP IP/OBS MODERATE 35: CPT | Mod: HCNC,,, | Performed by: NURSE PRACTITIONER

## 2025-01-07 PROCEDURE — 83735 ASSAY OF MAGNESIUM: CPT | Mod: HCNC

## 2025-01-07 PROCEDURE — 85025 COMPLETE CBC W/AUTO DIFF WBC: CPT | Mod: HCNC

## 2025-01-07 PROCEDURE — 25000003 PHARM REV CODE 250: Mod: HCNC | Performed by: HOSPITALIST

## 2025-01-07 PROCEDURE — 0BP1XDZ REMOVAL OF INTRALUMINAL DEVICE FROM TRACHEA, EXTERNAL APPROACH: ICD-10-PCS | Performed by: RADIOLOGY

## 2025-01-07 PROCEDURE — 36415 COLL VENOUS BLD VENIPUNCTURE: CPT | Mod: HCNC

## 2025-01-07 PROCEDURE — 25000003 PHARM REV CODE 250: Mod: HCNC | Performed by: STUDENT IN AN ORGANIZED HEALTH CARE EDUCATION/TRAINING PROGRAM

## 2025-01-07 PROCEDURE — 25000003 PHARM REV CODE 250: Mod: HCNC | Performed by: NURSE PRACTITIONER

## 2025-01-07 PROCEDURE — 84100 ASSAY OF PHOSPHORUS: CPT | Mod: HCNC

## 2025-01-07 PROCEDURE — 21400001 HC TELEMETRY ROOM: Mod: HCNC

## 2025-01-07 PROCEDURE — 25000003 PHARM REV CODE 250: Mod: HCNC

## 2025-01-07 PROCEDURE — 63600175 PHARM REV CODE 636 W HCPCS: Mod: HCNC | Performed by: RADIOLOGY

## 2025-01-07 RX ORDER — FENTANYL CITRATE 50 UG/ML
INJECTION, SOLUTION INTRAMUSCULAR; INTRAVENOUS
Status: COMPLETED | OUTPATIENT
Start: 2025-01-07 | End: 2025-01-07

## 2025-01-07 RX ADMIN — SODIUM BICARBONATE 650 MG TABLET 650 MG: at 08:01

## 2025-01-07 RX ADMIN — HYDROCODONE BITARTRATE AND ACETAMINOPHEN 1 TABLET: 10; 325 TABLET ORAL at 06:01

## 2025-01-07 RX ADMIN — APIXABAN 2.5 MG: 2.5 TABLET, FILM COATED ORAL at 08:01

## 2025-01-07 RX ADMIN — DULOXETINE HYDROCHLORIDE 40 MG: 20 CAPSULE, DELAYED RELEASE ORAL at 08:01

## 2025-01-07 RX ADMIN — ATORVASTATIN CALCIUM 80 MG: 40 TABLET, FILM COATED ORAL at 08:01

## 2025-01-07 RX ADMIN — AMLODIPINE BESYLATE 10 MG: 10 TABLET ORAL at 08:01

## 2025-01-07 RX ADMIN — IPRATROPIUM BROMIDE 2 SPRAY: 42 SPRAY, METERED NASAL at 08:01

## 2025-01-07 RX ADMIN — MUPIROCIN: 20 OINTMENT TOPICAL at 08:01

## 2025-01-07 RX ADMIN — OXYBUTYNIN CHLORIDE 10 MG: 10 TABLET, EXTENDED RELEASE ORAL at 08:01

## 2025-01-07 RX ADMIN — LOSARTAN POTASSIUM 100 MG: 50 TABLET, FILM COATED ORAL at 08:01

## 2025-01-07 RX ADMIN — LEVOTHYROXINE SODIUM 50 MCG: 0.05 TABLET ORAL at 05:01

## 2025-01-07 RX ADMIN — CARVEDILOL 12.5 MG: 12.5 TABLET, FILM COATED ORAL at 08:01

## 2025-01-07 RX ADMIN — SEVELAMER CARBONATE 800 MG: 800 TABLET, FILM COATED ORAL at 12:01

## 2025-01-07 RX ADMIN — HYDROCODONE BITARTRATE AND ACETAMINOPHEN 1 TABLET: 10; 325 TABLET ORAL at 04:01

## 2025-01-07 RX ADMIN — SEVELAMER CARBONATE 800 MG: 800 TABLET, FILM COATED ORAL at 04:01

## 2025-01-07 RX ADMIN — ANASTROZOLE 1 MG: 1 TABLET, COATED ORAL at 08:01

## 2025-01-07 RX ADMIN — TRAZODONE HYDROCHLORIDE 100 MG: 100 TABLET ORAL at 08:01

## 2025-01-07 RX ADMIN — FENTANYL CITRATE 50 MCG: 0.05 INJECTION, SOLUTION INTRAMUSCULAR; INTRAVENOUS at 11:01

## 2025-01-07 RX ADMIN — METHOCARBAMOL 750 MG: 750 TABLET ORAL at 08:01

## 2025-01-07 RX ADMIN — HYDROCODONE BITARTRATE AND ACETAMINOPHEN 1 TABLET: 10; 325 TABLET ORAL at 12:01

## 2025-01-07 NOTE — SUBJECTIVE & OBJECTIVE
Interval History:     Review of Systems  Objective:     Vital Signs (Most Recent):  Temp: 98.4 °F (36.9 °C) (01/07/25 1406)  Pulse: 73 (01/07/25 1406)  Resp: 18 (01/07/25 1406)  BP: 117/70 (01/07/25 1406)  SpO2: 96 % (01/07/25 1406) Vital Signs (24h Range):  Temp:  [98.2 °F (36.8 °C)-98.4 °F (36.9 °C)] 98.4 °F (36.9 °C)  Pulse:  [65-82] 73  Resp:  [17-21] 18  SpO2:  [95 %-100 %] 96 %  BP: (117-181)/(58-81) 117/70     Weight: 97.1 kg (214 lb)  Body mass index is 32.54 kg/m².    Intake/Output Summary (Last 24 hours) at 1/7/2025 1451  Last data filed at 1/7/2025 1300  Gross per 24 hour   Intake 890.07 ml   Output 2558 ml   Net -1667.93 ml      Physical Exam  Constitutional:       General: She is not in acute distress.  Cardiovascular:      Rate and Rhythm: Normal rate.      Pulses: Normal pulses.   Pulmonary:      Effort: No respiratory distress.      Breath sounds: Normal breath sounds. No wheezing.   Abdominal:      General: Bowel sounds are normal. There is no distension.      Palpations: Abdomen is soft.      Tenderness: There is no abdominal tenderness.   Musculoskeletal:      Right lower leg: No edema.      Left lower leg: No edema.   Neurological:      Mental Status: She is alert and oriented to person, place, and time. Mental status is at baseline.         MELD 3.0: 25 at 1/2/2025  5:26 AM  MELD-Na: 25 at 1/2/2025  5:26 AM  Calculated from:  Serum Creatinine: On dialysis. Using the maximum value.  Serum Sodium: 130 mmol/L at 1/2/2025  5:26 AM  Total Bilirubin: 0.2 mg/dL (Using min of 1 mg/dL) at 1/2/2025  5:26 AM  Serum Albumin: 2.3 g/dL at 1/2/2025  5:26 AM  INR(ratio): 1 at 12/31/2024  9:37 AM  Age at listing (hypothetical): 74 years  Sex: Female at 1/2/2025  5:26 AM      Significant Labs:  CBC:  Recent Labs   Lab 01/06/25  0439 01/07/25  0336   WBC 7.80 7.15   HGB 9.4* 9.2*   HCT 30.1* 28.9*    227     CMP:  Recent Labs   Lab 01/06/25  0439 01/07/25  0336   * 127*   K 4.2 3.9   CL 94* 97   CO2  "22* 22*   * 124*   BUN 35* 19   CREATININE 2.9* 2.0*   CALCIUM 8.0* 8.3*   PROT 6.1 5.9*   ALBUMIN 2.4* 2.4*   BILITOT 0.3 0.2   ALKPHOS 78 78   AST 15 14   ALT 15 15   ANIONGAP 9 8     PTINR:  No results for input(s): "INR" in the last 48 hours.    Significant Procedures:   Dobutamine Stress Test with Color Flow: No results found. However, due to the size of the patient record, not all encounters were searched. Please check Results Review for a complete set of results.    "

## 2025-01-07 NOTE — PROGRESS NOTES
[facility]  Nephrology  Progress Note    Patient Name: Cecile Bowen  MRN: [id number]  Admission Date: 12/31/2024  Hospital Length of Stay: 7 days  Attending Provider: Marcela Vicente,*   Primary Care Physician: Lurdes Navarro MD  Principal Problem:Severe sepsis    Subjective:     Interval History:   HD completed yesterday via AVF and R CVC. 2 L removed. No distress on exam.   RN unable to cannulate AVF with arterial and venous needle despite fistulogram yesterday.   Plans for line removal for 24-48 hr line holiday; will need line replaced prior to dc and OP follow up with Vascular.     Review of patient's allergies indicates:  No Known Allergies  Current Facility-Administered Medications   Medication Frequency    acetaminophen tablet 650 mg Q4H PRN    amLODIPine tablet 10 mg Daily    anastrozole tablet 1 mg Daily    atorvastatin tablet 80 mg Daily    carvediloL tablet 12.5 mg BID    dextrose 50% injection 12.5 g PRN    dextrose 50% injection 12.5 g PRN    dextrose 50% injection 25 g PRN    dextrose 50% injection 25 g PRN    DULoxetine DR capsule 40 mg Daily    epoetin chloe-epbx injection 4,900 Units Every Mon, Wed, Fri    glucagon (human recombinant) injection 1 mg PRN    glucagon (human recombinant) injection 1 mg PRN    glucose chewable tablet 16 g PRN    glucose chewable tablet 16 g PRN    glucose chewable tablet 24 g PRN    glucose chewable tablet 24 g PRN    heparin (porcine) injection 1,000 Units PRN    HYDROcodone-acetaminophen  mg per tablet 1 tablet Q4H PRN    insulin aspart U-100 pen 1-10 Units QID (AC + HS) PRN    ipratropium 42 mcg (0.06 %) nasal spray 2 spray BID    levothyroxine tablet 50 mcg Before breakfast    losartan tablet 100 mg Daily    methocarbamoL tablet 750 mg TID PRN    mupirocin 2 % ointment BID    naloxone 0.4 mg/mL injection 0.02 mg PRN    ondansetron injection 8 mg Q6H PRN    oxybutynin 24 hr tablet 10 mg Daily    polyethylene  glycol packet 17 g Daily PRN    sevelamer carbonate tablet 800 mg TID WM    sodium bicarbonate tablet 650 mg Daily    sodium chloride 0.9% flush 5 mL PRN    traZODone tablet 100 mg Nightly PRN    vancomycin - pharmacy to dose pharmacy to manage frequency     Facility-Administered Medications Ordered in Other Encounters   Medication Frequency    epoetin chloe injection 2,000 Units 1 time in Clinic/HOD    heparin (porcine) injection 2,000 Units Once    heparin (porcine) injection 2,000 Units Once    heparin (porcine) injection 4,000 Units 1 time in Clinic/HOD    iron sucrose injection 100 mg 1 time in Clinic/HOD       Objective:     Vital Signs (Most Recent):  Temp: 98.3 °F (36.8 °C) (01/07/25 0750)  Pulse: 82 (01/07/25 1228)  Resp: 17 (01/07/25 1217)  BP: (!) 164/70 (01/07/25 1155)  SpO2: 97 % (01/07/25 1155) Vital Signs (24h Range):  Temp:  [98.2 °F (36.8 °C)-98.3 °F (36.8 °C)] 98.3 °F (36.8 °C)  Pulse:  [65-82] 82  Resp:  [17-21] 17  SpO2:  [95 %-100 %] 97 %  BP: (125-181)/(58-81) 164/70     Weight: 97.1 kg (214 lb) (01/01/25 0538)  Body mass index is 32.54 kg/m².  Body surface area is 2.16 meters squared.    I/O last 3 completed shifts:  In: 1130.1 [P.O.:480; I.V.:350.1; Other:300]  Out: 3258 [Urine:700; Other:2558]     Physical Exam  Vitals and nursing note reviewed.   Constitutional:       Appearance: She is ill-appearing.   HENT:      Head: Normocephalic.   Eyes:      Conjunctiva/sclera: Conjunctivae normal.   Cardiovascular:      Rate and Rhythm: Normal rate.      Arteriovenous access: Left arteriovenous access is present.     Comments: LUE AVF + thrill  Pulmonary:      Effort: Pulmonary effort is normal. No respiratory distress.   Abdominal:      Palpations: Abdomen is soft.      Tenderness: There is no abdominal tenderness.   Musculoskeletal:      Right lower leg: Edema present.      Left lower leg: Edema present.   Skin:     General: Skin is warm and dry.   Neurological:      Mental Status: She is alert.    Psychiatric:         Mood and Affect: Mood normal.          Significant Labs:  CBC:   Recent Labs   Lab 01/07/25  0336   WBC 7.15   RBC 3.11*   HGB 9.2*   HCT 28.9*      MCV 93   MCH 29.6   MCHC 31.8*     CMP:   Recent Labs   Lab 01/07/25  0336   *   CALCIUM 8.3*   ALBUMIN 2.4*   PROT 5.9*   *   K 3.9   CO2 22*   CL 97   BUN 19   CREATININE 2.0*   ALKPHOS 78   ALT 15   AST 14   BILITOT 0.2     All labs within the past 24 hours have been reviewed.     Assessment/Plan:     Renal/  ESRD (end stage renal disease) on dialysis  74 year old female hx of ESRD on HD MWF presenting for AMS and fever, concern for sepsis.     Nephrology History  iHD Schedule: , now MWF per patient  Unit/MD: [facility]/Dr. Hare   Duration: 4 hours   EDW: 98 kg.   Access: JENNY AVF (has TDC scheduled to be removed)  Residual Renal Function: +++    Plan/Recommendations  -No emergent need for HD today; sp fistulogram yesterday but unable to be successfully cannulated with both needles. Will need FU with vascular. Plans for line removal today with IR and 24-48 hr line holiday. Will need line replaced prior to dc with plans for FU with vascular in the OP setting.   -Hgb goal 10-11, hgb 9.2. EPO with HD.   -Strict I&Os  -phos 4.2, no binders.   -Renal diet if not NPO     ID  * Severe sepsis  -management per primary        Thank you for your consult. I will follow-up with patient. Please contact us if you have any additional questions.    Mayra Porter, MUNA, FNP-C  Nephrology  [facility]

## 2025-01-07 NOTE — ASSESSMENT & PLAN NOTE
Pt admitted for severe sepsis 2/2 CAUTI and Enterococcus bacteremia. On arrival febrile to 101.7F, tachycardic 102 with WBC 21 and slow to respond (encephalopathy). Suprapubic catheter changed in the ED. WBC down to 12, Procal 1.4. Urine cx with ESBL Klebsiella and Proteus. Blood cx 12.31 with Enterococcus faecalis. Blood cx 1.2 NGTD.     - ID consulted, appreciate recommendations:  -- 1 time dose of gentamicin  -- Continue vanc for Enterococcus faecalis bacteremia  -- Of note, does have a tunneled line which is likely the culprit of infection however there are issues with her AVF     s/p line removal by IR today for 24-48 hr line holiday; followed by line replacement will need OP follow up with Vascular

## 2025-01-07 NOTE — ASSESSMENT & PLAN NOTE
Creatine stable for now. BMP reviewed- noted Estimated Creatinine Clearance: 31 mL/min (A) (based on SCr of 2 mg/dL (H)). according to latest data. Based on current GFR, CKD stage is end stage.  Monitor UOP and serial BMP and adjust therapy as needed. Renally dose meds. Avoid nephrotoxic medications and procedures.    Nephro consult  Has been getting HD MF via right IJ PermCath  Needs f/u with Vascular Surgery for IJ removal  Vascular Surgery consulted for problem cannulating HD access   s/p fistulogram 1/6 with good flow.RN unable to cannulate AVF with arterial and venous needle despite fistulogram    s/p line removal by IR today for 24-48 hr line holiday; followed by line replacement will need OP follow up with Vascular

## 2025-01-07 NOTE — NURSING
3.5 hours dialysis treatment  completed . 2 L  UF removed.  HD  needle was  deaccessed and  pressure held for  10 minutes .  Venous lumens of HD  catheter was  flushed and heparin locked  and  wrapped  with tape  and gauze .   Report  given to Primary Nurse and Patient was transported in bed .

## 2025-01-07 NOTE — SEDATION DOCUMENTATION
RIGHT subclavian tunneled HD line removed. Pt tolerated procedure moderately. NAD VSS Pt's primary nurse given report via EPIC chat.

## 2025-01-07 NOTE — SUBJECTIVE & OBJECTIVE
Interval History:   HD completed yesterday via AVF and R CVC. 2 L removed. No distress on exam.   RN unable to cannulate AVF with arterial and venous needle despite fistulogram yesterday.   Plans for line removal for 24-48 hr line holiday; will need line replaced prior to dc and OP follow up with Vascular.     Review of patient's allergies indicates:  No Known Allergies  Current Facility-Administered Medications   Medication Frequency    acetaminophen tablet 650 mg Q4H PRN    amLODIPine tablet 10 mg Daily    anastrozole tablet 1 mg Daily    atorvastatin tablet 80 mg Daily    carvediloL tablet 12.5 mg BID    dextrose 50% injection 12.5 g PRN    dextrose 50% injection 12.5 g PRN    dextrose 50% injection 25 g PRN    dextrose 50% injection 25 g PRN    DULoxetine DR capsule 40 mg Daily    epoetin chloe-epbx injection 4,900 Units Every Mon, Wed, Fri    glucagon (human recombinant) injection 1 mg PRN    glucagon (human recombinant) injection 1 mg PRN    glucose chewable tablet 16 g PRN    glucose chewable tablet 16 g PRN    glucose chewable tablet 24 g PRN    glucose chewable tablet 24 g PRN    heparin (porcine) injection 1,000 Units PRN    HYDROcodone-acetaminophen  mg per tablet 1 tablet Q4H PRN    insulin aspart U-100 pen 1-10 Units QID (AC + HS) PRN    ipratropium 42 mcg (0.06 %) nasal spray 2 spray BID    levothyroxine tablet 50 mcg Before breakfast    losartan tablet 100 mg Daily    methocarbamoL tablet 750 mg TID PRN    mupirocin 2 % ointment BID    naloxone 0.4 mg/mL injection 0.02 mg PRN    ondansetron injection 8 mg Q6H PRN    oxybutynin 24 hr tablet 10 mg Daily    polyethylene glycol packet 17 g Daily PRN    sevelamer carbonate tablet 800 mg TID WM    sodium bicarbonate tablet 650 mg Daily    sodium chloride 0.9% flush 5 mL PRN    traZODone tablet 100 mg Nightly PRN    vancomycin - pharmacy to dose pharmacy to manage frequency     Facility-Administered Medications Ordered in Other Encounters   Medication  Frequency    epoetin chloe injection 2,000 Units 1 time in Clinic/HOD    heparin (porcine) injection 2,000 Units Once    heparin (porcine) injection 2,000 Units Once    heparin (porcine) injection 4,000 Units 1 time in Clinic/HOD    iron sucrose injection 100 mg 1 time in Clinic/HOD       Objective:     Vital Signs (Most Recent):  Temp: 98.3 °F (36.8 °C) (01/07/25 0750)  Pulse: 82 (01/07/25 1228)  Resp: 17 (01/07/25 1217)  BP: (!) 164/70 (01/07/25 1155)  SpO2: 97 % (01/07/25 1155) Vital Signs (24h Range):  Temp:  [98.2 °F (36.8 °C)-98.3 °F (36.8 °C)] 98.3 °F (36.8 °C)  Pulse:  [65-82] 82  Resp:  [17-21] 17  SpO2:  [95 %-100 %] 97 %  BP: (125-181)/(58-81) 164/70     Weight: 97.1 kg (214 lb) (01/01/25 0538)  Body mass index is 32.54 kg/m².  Body surface area is 2.16 meters squared.    I/O last 3 completed shifts:  In: 1130.1 [P.O.:480; I.V.:350.1; Other:300]  Out: 3258 [Urine:700; Other:2558]     Physical Exam  Vitals and nursing note reviewed.   Constitutional:       Appearance: She is ill-appearing.   HENT:      Head: Normocephalic.   Eyes:      Conjunctiva/sclera: Conjunctivae normal.   Cardiovascular:      Rate and Rhythm: Normal rate.      Arteriovenous access: Left arteriovenous access is present.     Comments: LUE AVF + thrill  Pulmonary:      Effort: Pulmonary effort is normal. No respiratory distress.   Abdominal:      Palpations: Abdomen is soft.      Tenderness: There is no abdominal tenderness.   Musculoskeletal:      Right lower leg: Edema present.      Left lower leg: Edema present.   Skin:     General: Skin is warm and dry.   Neurological:      Mental Status: She is alert.   Psychiatric:         Mood and Affect: Mood normal.          Significant Labs:  CBC:   Recent Labs   Lab 01/07/25  0336   WBC 7.15   RBC 3.11*   HGB 9.2*   HCT 28.9*      MCV 93   MCH 29.6   MCHC 31.8*     CMP:   Recent Labs   Lab 01/07/25  0336   *   CALCIUM 8.3*   ALBUMIN 2.4*   PROT 5.9*   *   K 3.9   CO2 22*    CL 97   BUN 19   CREATININE 2.0*   ALKPHOS 78   ALT 15   AST 14   BILITOT 0.2     All labs within the past 24 hours have been reviewed.

## 2025-01-07 NOTE — ASSESSMENT & PLAN NOTE
74 year old female hx of ESRD on HD MWF presenting for AMS and fever, concern for sepsis.     Nephrology History  iHD Schedule: MF, now MWF per patient  Unit/MD: [facility]/Dr. Hare   Duration: 4 hours   EDW: 98 kg.   Access: JENNY AVF (has TDC scheduled to be removed)  Residual Renal Function: +++    Plan/Recommendations  -No emergent need for HD today; sp fistulogram yesterday but unable to be successfully cannulated with both needles. Will need FU with vascular. Plans for line removal today with IR and 24-48 hr line holiday. Will need line replaced prior to dc with plans for FU with vascular in the OP setting.   -Hgb goal 10-11, hgb 9.2. EPO with HD.   -Strict I&Os  -phos 4.2, no binders.   -Renal diet if not NPO

## 2025-01-07 NOTE — PROGRESS NOTES
[facility] - Internal Medicine [facility] Medicine  Progress Note    Patient Name: Cecile Bowen  MRN: [id number]  Patient Class: IP- Inpatient   Admission Date: 12/31/2024  Length of Stay: 7 days  Attending Physician: Marcela Vicente,*  Primary Care Provider: Lurdes Navarro MD        Subjective     Principal Problem:Severe sepsis        HPI:  Ms. Cecile Bowen is a 74 y.o. female with ESRD on HD, T2DM, afib on Eliquis, HTN, obesity, history of breast cancer, chronic suprapubic catheter for neurogenic bladder and chronic back pain who presented to the [facility] ED 12/31 from Vascular Surgery for evaluation of fever and AMS.  History is obtained from patient.  She reports that over the last few days, she has been having fever and lethargy.  She went to see her Vascular Surgeon for right IJ PermCath removal, as her fistula has matured.  She is currently dialyzing only Mondays and Fridays via right IJ PermCath.  However, upon arrival to clinic she was febrile to 100.9F, slow to respond and tachy to 104.  She was sent to the ER for further evaluation.  At baseline, she uses a wheelchair.  She lives with her sister.  She currently endorses back pain that is not escalated from normal.  She is full code.  She took all of her medications prior to going to clinic except her Eliquis.    Upon arrival to the ER, vitals were temp 101.7F, , /77.  Labs showed WBC 20, Hgb 11.3, Sodium 129, BUN/Cr 28/3.2, Lactic 0.8.  UA was dirty.  Suprapubic catheter was exchanged in the ED.  CXR was negative.  She was given Vanc and Zosyn was admitted to Hospital Medicine for further management.    Overview/Hospital Course:  Ms. Bowen is a 74 y.o. female with ESRD on HD, T2DM, afib on Eliquis, HTN, obesity, history of breast cancer, chronic suprapubic catheter for neurogenic bladder and chronic back pain who was admitted for severe sepsis 2/2 Enterococcus bacteremia and suprapubic UTI.  She was started on  Zosyn.  After her blood cx returned positive, ID was consulted and she was started on Dapto for Enterococcus. Switched to Vanc . Plan for 8 weeks of Vanc with HD from discharge Urine cx returned with ESBL Klebsiella and Proteus, unclear if this is a UTI or just colonization. S/p one dose of gentamicin per ID Had issues with AVF for HD during stay. Vascular surgery was consulted s/p fistulogram 1/6 with good flow. RN unable to cannulate AVF with arterial and venous needle despite fistulogram  s/p line removal by IR today for 24-48 hr line holiday; followed by line replacement will need OP follow up with Vascular    Interval History:     Review of Systems  Objective:     Vital Signs (Most Recent):  Temp: 98.4 °F (36.9 °C) (01/07/25 1406)  Pulse: 73 (01/07/25 1406)  Resp: 18 (01/07/25 1406)  BP: 117/70 (01/07/25 1406)  SpO2: 96 % (01/07/25 1406) Vital Signs (24h Range):  Temp:  [98.2 °F (36.8 °C)-98.4 °F (36.9 °C)] 98.4 °F (36.9 °C)  Pulse:  [65-82] 73  Resp:  [17-21] 18  SpO2:  [95 %-100 %] 96 %  BP: (117-181)/(58-81) 117/70     Weight: 97.1 kg (214 lb)  Body mass index is 32.54 kg/m².    Intake/Output Summary (Last 24 hours) at 1/7/2025 1451  Last data filed at 1/7/2025 1300  Gross per 24 hour   Intake 890.07 ml   Output 2558 ml   Net -1667.93 ml      Physical Exam  Constitutional:       General: She is not in acute distress.  Cardiovascular:      Rate and Rhythm: Normal rate.      Pulses: Normal pulses.   Pulmonary:      Effort: No respiratory distress.      Breath sounds: Normal breath sounds. No wheezing.   Abdominal:      General: Bowel sounds are normal. There is no distension.      Palpations: Abdomen is soft.      Tenderness: There is no abdominal tenderness.   Musculoskeletal:      Right lower leg: No edema.      Left lower leg: No edema.   Neurological:      Mental Status: She is alert and oriented to person, place, and time. Mental status is at baseline.         MELD 3.0: 25 at 1/2/2025  5:26 AM  MELD-Na:  "25 at 1/2/2025  5:26 AM  Calculated from:  Serum Creatinine: On dialysis. Using the maximum value.  Serum Sodium: 130 mmol/L at 1/2/2025  5:26 AM  Total Bilirubin: 0.2 mg/dL (Using min of 1 mg/dL) at 1/2/2025  5:26 AM  Serum Albumin: 2.3 g/dL at 1/2/2025  5:26 AM  INR(ratio): 1 at 12/31/2024  9:37 AM  Age at listing (hypothetical): 74 years  Sex: Female at 1/2/2025  5:26 AM      Significant Labs:  CBC:  Recent Labs   Lab 01/06/25  0439 01/07/25  0336   WBC 7.80 7.15   HGB 9.4* 9.2*   HCT 30.1* 28.9*    227     CMP:  Recent Labs   Lab 01/06/25  0439 01/07/25  0336   * 127*   K 4.2 3.9   CL 94* 97   CO2 22* 22*   * 124*   BUN 35* 19   CREATININE 2.9* 2.0*   CALCIUM 8.0* 8.3*   PROT 6.1 5.9*   ALBUMIN 2.4* 2.4*   BILITOT 0.3 0.2   ALKPHOS 78 78   AST 15 14   ALT 15 15   ANIONGAP 9 8     PTINR:  No results for input(s): "INR" in the last 48 hours.    Significant Procedures:   Dobutamine Stress Test with Color Flow: No results found. However, due to the size of the patient record, not all encounters were searched. Please check Results Review for a complete set of results.      Assessment and Plan     * Severe sepsis  Pt admitted for severe sepsis 2/2 CAUTI and Enterococcus bacteremia. On arrival febrile to 101.7F, tachycardic 102 with WBC 21 and slow to respond (encephalopathy). Suprapubic catheter changed in the ED. WBC down to 12, Procal 1.4. Urine cx with ESBL Klebsiella and Proteus. Blood cx 12.31 with Enterococcus faecalis. Blood cx 1.2 NGTD.     - ID consulted, appreciate recommendations:  -- 1 time dose of gentamicin  -- Continue vanc for Enterococcus faecalis bacteremia  -- Of note, does have a tunneled line which is likely the culprit of infection however there are issues with her AVF     s/p line removal by IR today for 24-48 hr line holiday; followed by line replacement will need OP follow up with Vascular    HTN (hypertension)  Patient's blood pressure range in the last 24 hours was: BP  " Min: 123/68  Max: 169/71.The patient's inpatient anti-hypertensive regimen is listed below:  Current Antihypertensives  losartan tablet 100 mg, Daily, Oral  carvediloL tablet 12.5 mg, 2 times daily, Oral  amLODIPine tablet 10 mg, Daily, Oral    Plan  - BP increasing since holding BP meds for sepsis  - continue Coreg and Losartan.  - restarting home norvasc    Positive JASON (antinuclear antibody)  History noted      Personal history of malignant neoplasm of breast  History noted  Continue Arimidex      ESRD (end stage renal disease) on dialysis  Creatine stable for now. BMP reviewed- noted Estimated Creatinine Clearance: 31 mL/min (A) (based on SCr of 2 mg/dL (H)). according to latest data. Based on current GFR, CKD stage is end stage.  Monitor UOP and serial BMP and adjust therapy as needed. Renally dose meds. Avoid nephrotoxic medications and procedures.    Nephro consult  Has been getting HD MF via right IJ PermCath  Needs f/u with Vascular Surgery for IJ removal  Vascular Surgery consulted for problem cannulating HD access   s/p fistulogram 1/6 with good flow.RN unable to cannulate AVF with arterial and venous needle despite fistulogram    s/p line removal by IR today for 24-48 hr line holiday; followed by line replacement will need OP follow up with Vascular    Pressure injury of right buttock, stage 3  Wound Care      Anemia in ESRD (end-stage renal disease)  Anemia is likely due to chronic disease due to ESRD. Most recent hemoglobin and hematocrit are listed below.  Recent Labs     01/05/25  0511 01/06/25  0439   HGB 9.1* 9.4*   HCT 28.2* 30.1*       Plan  - Monitor serial CBC: Daily  - Transfuse PRBC if patient becomes hemodynamically unstable, symptomatic or H/H drops below 7/21.  - Patient has not received any PRBC transfusions to date      Unspecified atrial fibrillation  Patient has paroxysmal (<7 days) atrial fibrillation. Patient is currently in sinus rhythm. [id number] Score: 3. The patients heart rate  in the last 24 hours is as follows:  Pulse  Min: 66  Max: 80     Antiarrhythmics       Anticoagulants  apixaban tablet 5 mg, 2 times daily, Oral  heparin (porcine) injection 1,000 Units, As needed (PRN), Intra-Catheter    Plan  - Replete lytes with a goal of K>4, Mg >2  - Patient is anticoagulated, see medications listed above.  - Patient's afib is currently controlled  - Previously had afib, and on Eliquis once a day because of bruising?  As she is being monitored will put on appropriate BID dosing    Bacteremia due to Enterococcus  See severe sepsis      Catheter-associated urinary tract infection  See severe sepsis      Type 2 diabetes mellitus treated with insulin  HbA1c 6.2, sugars controlled  Home DM regimen:  Largine 15 qHS  Hold scheduled insulin for now with AMS and decreased oral intake  SSI with POCT accuchecks to achieve blood glucose of 140-180 while hospitalized  Hypoglycemia protocol  Diabetic diet        Prophylactic use of anastrozole (Arimidex)  On Arimidex for history of breast cancer      Obesity, diabetes, and hypertension syndrome  Noted    Chronic pain  2/2 lumbar spondylosis  See above      Lumbar spondylosis  Chronic issue  Continue Cymbalta  Continue Robaxin TID PRN  Continue Norco prn      Class 1 obesity due to excess calories with serious comorbidity in adult  Body mass index is 32.54 kg/m². Morbid obesity complicates all aspects of disease management from diagnostic modalities to treatment. Weight loss encouraged and health benefits explained to patient.         Major depressive disorder, recurrent, moderate  Patient has recurrent depression which is moderate and is currently controlled. Will Continue anti-depressant medications. We will not consult psychiatry at this time. Patient does not display psychosis at this time. Continue to monitor closely and adjust plan of care as needed.      Chronic and stable  Continue Cymbalta      Neurogenic bladder  Chronic issue  Has suprapubic cath,  replaced 12/31  Continue Oxybutynin      Hyponatremia  Hyponatremia is likely due to volume overload from ESRD. The patient's most recent sodium results are listed below.  Recent Labs     01/04/25  0839 01/06/25  0439   * 125*       Plan  - Correct the sodium by 4-6mEq in 24 hours.   - Will treat the hyponatremia with HD  - Monitor sodium Daily.   - Patient hyponatremia is stable    Hyperlipidemia  Chronic and stable  Continue Lipitor    Hypothyroidism  Chronic and stable  Continue Synthroid  TSH 1.631        VTE Risk Mitigation (From admission, onward)           Ordered     apixaban tablet 5 mg  2 times daily         01/07/25 1450     heparin (porcine) injection 1,000 Units  As needed (PRN)         01/01/25 1610                    Discharge Planning   FLORENTINO: 1/10/2025     Code Status: Full Code   Medical Readiness for Discharge Date:   Discharge Plan A: Home Health                        Marcela Vicente MD  Department of Hospital Medicine   [facility] - Internal Medicine Telemetry

## 2025-01-08 LAB
ALBUMIN SERPL BCP-MCNC: 2.7 G/DL (ref 3.5–5.2)
ALP SERPL-CCNC: 85 U/L (ref 40–150)
ALT SERPL W/O P-5'-P-CCNC: 14 U/L (ref 10–44)
ANION GAP SERPL CALC-SCNC: 12 MMOL/L (ref 8–16)
AST SERPL-CCNC: 21 U/L (ref 10–40)
BASOPHILS # BLD AUTO: 0.04 K/UL (ref 0–0.2)
BASOPHILS NFR BLD: 0.5 % (ref 0–1.9)
BILIRUB SERPL-MCNC: 0.3 MG/DL (ref 0.1–1)
BUN SERPL-MCNC: 29 MG/DL (ref 8–23)
CALCIUM SERPL-MCNC: 8.2 MG/DL (ref 8.7–10.5)
CHLORIDE SERPL-SCNC: 93 MMOL/L (ref 95–110)
CO2 SERPL-SCNC: 19 MMOL/L (ref 23–29)
CREAT SERPL-MCNC: 2.6 MG/DL (ref 0.5–1.4)
DIFFERENTIAL METHOD BLD: ABNORMAL
EOSINOPHIL # BLD AUTO: 0.4 K/UL (ref 0–0.5)
EOSINOPHIL NFR BLD: 4.5 % (ref 0–8)
ERYTHROCYTE [DISTWIDTH] IN BLOOD BY AUTOMATED COUNT: 12.8 % (ref 11.5–14.5)
EST. GFR  (NO RACE VARIABLE): 19 ML/MIN/1.73 M^2
GLUCOSE SERPL-MCNC: 151 MG/DL (ref 70–110)
HCT VFR BLD AUTO: 29.9 % (ref 37–48.5)
HGB BLD-MCNC: 9.5 G/DL (ref 12–16)
IMM GRANULOCYTES # BLD AUTO: 0.09 K/UL (ref 0–0.04)
IMM GRANULOCYTES NFR BLD AUTO: 1.1 % (ref 0–0.5)
LYMPHOCYTES # BLD AUTO: 1.9 K/UL (ref 1–4.8)
LYMPHOCYTES NFR BLD: 24.2 % (ref 18–48)
MAGNESIUM SERPL-MCNC: 1.6 MG/DL (ref 1.6–2.6)
MCH RBC QN AUTO: 29.5 PG (ref 27–31)
MCHC RBC AUTO-ENTMCNC: 31.8 G/DL (ref 32–36)
MCV RBC AUTO: 93 FL (ref 82–98)
MONOCYTES # BLD AUTO: 1.1 K/UL (ref 0.3–1)
MONOCYTES NFR BLD: 13.9 % (ref 4–15)
NEUTROPHILS # BLD AUTO: 4.4 K/UL (ref 1.8–7.7)
NEUTROPHILS NFR BLD: 55.8 % (ref 38–73)
NRBC BLD-RTO: 0 /100 WBC
PHOSPHATE SERPL-MCNC: 4.5 MG/DL (ref 2.7–4.5)
PLATELET # BLD AUTO: 276 K/UL (ref 150–450)
PMV BLD AUTO: 9.1 FL (ref 9.2–12.9)
POCT GLUCOSE: 185 MG/DL (ref 70–110)
POCT GLUCOSE: 217 MG/DL (ref 70–110)
POCT GLUCOSE: 233 MG/DL (ref 70–110)
POCT GLUCOSE: 239 MG/DL (ref 70–110)
POTASSIUM SERPL-SCNC: 4.1 MMOL/L (ref 3.5–5.1)
PROT SERPL-MCNC: 6.4 G/DL (ref 6–8.4)
RBC # BLD AUTO: 3.22 M/UL (ref 4–5.4)
SODIUM SERPL-SCNC: 124 MMOL/L (ref 136–145)
VANCOMYCIN SERPL-MCNC: 18.7 UG/ML
WBC # BLD AUTO: 7.94 K/UL (ref 3.9–12.7)

## 2025-01-08 PROCEDURE — 27000207 HC ISOLATION: Mod: HCNC

## 2025-01-08 PROCEDURE — 21400001 HC TELEMETRY ROOM: Mod: HCNC

## 2025-01-08 PROCEDURE — 25000003 PHARM REV CODE 250: Mod: HCNC | Performed by: STUDENT IN AN ORGANIZED HEALTH CARE EDUCATION/TRAINING PROGRAM

## 2025-01-08 PROCEDURE — 99223 1ST HOSP IP/OBS HIGH 75: CPT | Mod: HCNC,,, | Performed by: FAMILY MEDICINE

## 2025-01-08 PROCEDURE — 80202 ASSAY OF VANCOMYCIN: CPT | Mod: HCNC | Performed by: STUDENT IN AN ORGANIZED HEALTH CARE EDUCATION/TRAINING PROGRAM

## 2025-01-08 PROCEDURE — 27000221 HC OXYGEN, UP TO 24 HOURS: Mod: HCNC

## 2025-01-08 PROCEDURE — 80053 COMPREHEN METABOLIC PANEL: CPT | Mod: HCNC

## 2025-01-08 PROCEDURE — 83735 ASSAY OF MAGNESIUM: CPT | Mod: HCNC

## 2025-01-08 PROCEDURE — 84100 ASSAY OF PHOSPHORUS: CPT | Mod: HCNC

## 2025-01-08 PROCEDURE — 85025 COMPLETE CBC W/AUTO DIFF WBC: CPT | Mod: HCNC

## 2025-01-08 PROCEDURE — 99232 SBSQ HOSP IP/OBS MODERATE 35: CPT | Mod: HCNC,,, | Performed by: NURSE PRACTITIONER

## 2025-01-08 PROCEDURE — 25000003 PHARM REV CODE 250: Mod: HCNC | Performed by: HOSPITALIST

## 2025-01-08 PROCEDURE — 25000003 PHARM REV CODE 250: Mod: HCNC

## 2025-01-08 PROCEDURE — 36415 COLL VENOUS BLD VENIPUNCTURE: CPT | Mod: HCNC

## 2025-01-08 PROCEDURE — 94761 N-INVAS EAR/PLS OXIMETRY MLT: CPT | Mod: HCNC

## 2025-01-08 PROCEDURE — 25000003 PHARM REV CODE 250: Mod: HCNC | Performed by: NURSE PRACTITIONER

## 2025-01-08 RX ADMIN — HYDROCODONE BITARTRATE AND ACETAMINOPHEN 1 TABLET: 10; 325 TABLET ORAL at 06:01

## 2025-01-08 RX ADMIN — TRAZODONE HYDROCHLORIDE 100 MG: 100 TABLET ORAL at 08:01

## 2025-01-08 RX ADMIN — APIXABAN 5 MG: 5 TABLET, FILM COATED ORAL at 11:01

## 2025-01-08 RX ADMIN — LOSARTAN POTASSIUM 100 MG: 50 TABLET, FILM COATED ORAL at 09:01

## 2025-01-08 RX ADMIN — IPRATROPIUM BROMIDE 2 SPRAY: 42 SPRAY, METERED NASAL at 08:01

## 2025-01-08 RX ADMIN — OXYBUTYNIN CHLORIDE 10 MG: 10 TABLET, EXTENDED RELEASE ORAL at 09:01

## 2025-01-08 RX ADMIN — APIXABAN 5 MG: 5 TABLET, FILM COATED ORAL at 08:01

## 2025-01-08 RX ADMIN — LEVOTHYROXINE SODIUM 50 MCG: 0.05 TABLET ORAL at 06:01

## 2025-01-08 RX ADMIN — SODIUM BICARBONATE 650 MG TABLET 650 MG: at 09:01

## 2025-01-08 RX ADMIN — SEVELAMER CARBONATE 800 MG: 800 TABLET, FILM COATED ORAL at 11:01

## 2025-01-08 RX ADMIN — SEVELAMER CARBONATE 800 MG: 800 TABLET, FILM COATED ORAL at 09:01

## 2025-01-08 RX ADMIN — METHOCARBAMOL 750 MG: 750 TABLET ORAL at 08:01

## 2025-01-08 RX ADMIN — ATORVASTATIN CALCIUM 80 MG: 40 TABLET, FILM COATED ORAL at 09:01

## 2025-01-08 RX ADMIN — HYDROCODONE BITARTRATE AND ACETAMINOPHEN 1 TABLET: 10; 325 TABLET ORAL at 02:01

## 2025-01-08 RX ADMIN — IPRATROPIUM BROMIDE 2 SPRAY: 42 SPRAY, METERED NASAL at 09:01

## 2025-01-08 RX ADMIN — AMLODIPINE BESYLATE 10 MG: 10 TABLET ORAL at 09:01

## 2025-01-08 RX ADMIN — SEVELAMER CARBONATE 800 MG: 800 TABLET, FILM COATED ORAL at 06:01

## 2025-01-08 RX ADMIN — ANASTROZOLE 1 MG: 1 TABLET, COATED ORAL at 09:01

## 2025-01-08 RX ADMIN — CARVEDILOL 12.5 MG: 12.5 TABLET, FILM COATED ORAL at 09:01

## 2025-01-08 RX ADMIN — HYDROCODONE BITARTRATE AND ACETAMINOPHEN 1 TABLET: 10; 325 TABLET ORAL at 11:01

## 2025-01-08 RX ADMIN — CARVEDILOL 12.5 MG: 12.5 TABLET, FILM COATED ORAL at 08:01

## 2025-01-08 RX ADMIN — DULOXETINE HYDROCHLORIDE 40 MG: 20 CAPSULE, DELAYED RELEASE ORAL at 09:01

## 2025-01-08 NOTE — ASSESSMENT & PLAN NOTE
Creatine stable for now. BMP reviewed- noted Estimated Creatinine Clearance: 23.8 mL/min (A) (based on SCr of 2.6 mg/dL (H)). according to latest data. Based on current GFR, CKD stage is end stage.  Monitor UOP and serial BMP and adjust therapy as needed. Renally dose meds. Avoid nephrotoxic medications and procedures.    Nephro consult  Has been getting HD MF via right IJ PermCath  Needs f/u with Vascular Surgery for IJ removal  Vascular Surgery consulted for problem cannulating HD access   s/p fistulogram 1/6 with good flow.RN unable to cannulate AVF with arterial and venous needle despite fistulogram    s/p line removal by IR today for 24-48 hr line holiday   Plan for TDC placement by IR in am NPO after MN

## 2025-01-08 NOTE — ASSESSMENT & PLAN NOTE
Pt admitted for severe sepsis 2/2 CAUTI and Enterococcus bacteremia. On arrival febrile to 101.7F, tachycardic 102 with WBC 21 and slow to respond (encephalopathy). Suprapubic catheter changed in the ED. WBC down to 12, Procal 1.4. Urine cx with ESBL Klebsiella and Proteus. Blood cx 12.31 with Enterococcus faecalis. Blood cx 1.2 NGTD.     - ID consulted, appreciate recommendations:  -- 1 time dose of gentamicin  -- Continue vanc for Enterococcus faecalis bacteremia  -- Of note, does have a tunneled line which is likely the culprit of infection however there are issues with her AVF     s/p line removal by IR today for 24-48 hr line holiday;  Plan for TDC placement by IR in am NPO after MN

## 2025-01-08 NOTE — PROGRESS NOTES
[facility] - Internal Medicine [facility]  Progress Note    Patient Name: Cceile Bowen  MRN: [id number]  Patient Class: IP- Inpatient   Admission Date: 12/31/2024  Length of Stay: 8 days  Attending Physician: Marcela Vicente,*  Primary Care Provider: Lurdes Navarro MD        Subjective     Principal Problem:Severe sepsis        HPI:  Ms. Cecile Bowen is a 74 y.o. female with ESRD on HD, T2DM, afib on Eliquis, HTN, obesity, history of breast cancer, chronic suprapubic catheter for neurogenic bladder and chronic back pain who presented to the [facility] [place]31 from Vascular Surgery for evaluation of fever and AMS.  History is obtained from patient.  She reports that over the last few days, she has been having fever and lethargy.  She went to see her Vascular Surgeon for right IJ PermCath removal, as her fistula has matured.  She is currently dialyzing only Mondays and Fridays via right IJ PermCath.  However, upon arrival to clinic she was febrile to 100.9F, slow to respond and tachy to 104.  She was sent to the ER for further evaluation.  At baseline, she uses a wheelchair.  She lives with her sister.  She currently endorses back pain that is not escalated from normal.  She is full code.  She took all of her medications prior to going to clinic except her Eliquis.    Upon arrival to the ER, vitals were temp 101.7F, , /77.  Labs showed WBC 20, Hgb 11.3, Sodium 129, BUN/Cr 28/3.2, Lactic 0.8.  UA was dirty.  Suprapubic catheter was exchanged in the ED.  CXR was negative.  She was given Vanc and Zosyn was admitted to Hospital Medicine for further management.    Overview/Hospital Course:  Ms. Bowen is a 74 y.o. female with ESRD on HD, T2DM, afib on Eliquis, HTN, obesity, history of breast cancer, chronic suprapubic catheter for neurogenic bladder and chronic back pain who was admitted for severe sepsis 2/2 Enterococcus bacteremia and suprapubic UTI.  She was started on  Zosyn.  After her blood cx returned positive, ID was consulted and she was started on Dapto for Enterococcus. Switched to Vanc . Plan for 8 weeks of Vanc with HD from discharge Urine cx returned with ESBL Klebsiella and Proteus, unclear if this is a UTI or just colonization. S/p one dose of gentamicin per ID Had issues with AVF for HD during stay. Vascular surgery was consulted s/p fistulogram 1/6 with good flow. RN unable to cannulate AVF with arterial and venous needle despite fistulogram  s/p line removal by IR today for 24-48 hr line holiday; Plan for TDC placement by IR in am NPO after MN will need OP follow up with Vascular    Interval History:     Review of Systems  Objective:     Vital Signs (Most Recent):  Temp: 98.1 °F (36.7 °C) (01/08/25 1141)  Pulse: 76 (01/08/25 1141)  Resp: 18 (01/08/25 1146)  BP: (!) 129/57 (01/08/25 1141)  SpO2: 99 % (01/08/25 1141) Vital Signs (24h Range):  Temp:  [97.7 °F (36.5 °C)-98.6 °F (37 °C)] 98.1 °F (36.7 °C)  Pulse:  [65-83] 76  Resp:  [17-18] 18  SpO2:  [95 %-100 %] 99 %  BP: (117-188)/(57-78) 129/57     Weight: 97.1 kg (214 lb)  Body mass index is 32.54 kg/m².    Intake/Output Summary (Last 24 hours) at 1/8/2025 1339  Last data filed at 1/8/2025 0213  Gross per 24 hour   Intake 240 ml   Output 400 ml   Net -160 ml      Physical Exam  Constitutional:       General: She is not in acute distress.  Cardiovascular:      Rate and Rhythm: Normal rate.      Pulses: Normal pulses.   Pulmonary:      Effort: No respiratory distress.      Breath sounds: Normal breath sounds. No wheezing.   Abdominal:      General: Bowel sounds are normal. There is no distension.      Palpations: Abdomen is soft.      Tenderness: There is no abdominal tenderness.   Musculoskeletal:      Right lower leg: No edema.      Left lower leg: No edema.   Neurological:      Mental Status: She is alert and oriented to person, place, and time. Mental status is at baseline.         MELD 3.0: 25 at 1/2/2025  5:26  "AM  MELD-Na: 25 at 1/2/2025  5:26 AM  Calculated from:  Serum Creatinine: On dialysis. Using the maximum value.  Serum Sodium: 130 mmol/L at 1/2/2025  5:26 AM  Total Bilirubin: 0.2 mg/dL (Using min of 1 mg/dL) at 1/2/2025  5:26 AM  Serum Albumin: 2.3 g/dL at 1/2/2025  5:26 AM  INR(ratio): 1 at 12/31/2024  9:37 AM  Age at listing (hypothetical): 74 years  Sex: Female at 1/2/2025  5:26 AM      Significant Labs:  CBC:  Recent Labs   Lab 01/07/25  0336 01/08/25  0232   WBC 7.15 7.94   HGB 9.2* 9.5*   HCT 28.9* 29.9*    276     CMP:  Recent Labs   Lab 01/07/25  0336 01/08/25  0232   * 124*   K 3.9 4.1   CL 97 93*   CO2 22* 19*   * 151*   BUN 19 29*   CREATININE 2.0* 2.6*   CALCIUM 8.3* 8.2*   PROT 5.9* 6.4   ALBUMIN 2.4* 2.7*   BILITOT 0.2 0.3   ALKPHOS 78 85   AST 14 21   ALT 15 14   ANIONGAP 8 12     PTINR:  No results for input(s): "INR" in the last 48 hours.      Assessment and Plan     * Severe sepsis  Pt admitted for severe sepsis 2/2 CAUTI and Enterococcus bacteremia. On arrival febrile to 101.7F, tachycardic 102 with WBC 21 and slow to respond (encephalopathy). Suprapubic catheter changed in the ED. WBC down to 12, Procal 1.4. Urine cx with ESBL Klebsiella and Proteus. Blood cx 12.31 with Enterococcus faecalis. Blood cx 1.2 NGTD.     - ID consulted, appreciate recommendations:  -- 1 time dose of gentamicin  -- Continue vanc for Enterococcus faecalis bacteremia  -- Of note, does have a tunneled line which is likely the culprit of infection however there are issues with her AVF     s/p line removal by IR today for 24-48 hr line holiday;  Plan for TDC placement by IR in am NPO after MN    HTN (hypertension)  Patient's blood pressure range in the last 24 hours was: BP  Min: 123/68  Max: 169/71.The patient's inpatient anti-hypertensive regimen is listed below:  Current Antihypertensives  losartan tablet 100 mg, Daily, Oral  carvediloL tablet 12.5 mg, 2 times daily, Oral  amLODIPine tablet 10 mg, " Daily, Oral    Plan  - BP increasing since holding BP meds for sepsis  - continue Coreg and Losartan.  - restarting home norvasc    Positive JASON (antinuclear antibody)  History noted      Personal history of malignant neoplasm of breast  History noted  Continue Arimidex      ESRD (end stage renal disease) on dialysis  Creatine stable for now. BMP reviewed- noted Estimated Creatinine Clearance: 23.8 mL/min (A) (based on SCr of 2.6 mg/dL (H)). according to latest data. Based on current GFR, CKD stage is end stage.  Monitor UOP and serial BMP and adjust therapy as needed. Renally dose meds. Avoid nephrotoxic medications and procedures.    Nephro consult  Has been getting HD MF via right IJ PermCath  Needs f/u with Vascular Surgery for IJ removal  Vascular Surgery consulted for problem cannulating HD access   s/p fistulogram 1/6 with good flow.RN unable to cannulate AVF with arterial and venous needle despite fistulogram    s/p line removal by IR today for 24-48 hr line holiday   Plan for TDC placement by IR in am NPO after MN    Pressure injury of right buttock, stage 3  Wound Care      Anemia in ESRD (end-stage renal disease)  Anemia is likely due to chronic disease due to ESRD. Most recent hemoglobin and hematocrit are listed below.  Recent Labs     01/05/25  0511 01/06/25  0439   HGB 9.1* 9.4*   HCT 28.2* 30.1*       Plan  - Monitor serial CBC: Daily  - Transfuse PRBC if patient becomes hemodynamically unstable, symptomatic or H/H drops below 7/21.  - Patient has not received any PRBC transfusions to date      Unspecified atrial fibrillation  Patient has paroxysmal (<7 days) atrial fibrillation. Patient is currently in sinus rhythm. [id number] Score: 3. The patients heart rate in the last 24 hours is as follows:  Pulse  Min: 66  Max: 80     Antiarrhythmics       Anticoagulants  apixaban tablet 5 mg, 2 times daily, Oral  heparin (porcine) injection 1,000 Units, As needed (PRN), Intra-Catheter    Plan  - Replete lytes  with a goal of K>4, Mg >2  - Patient is anticoagulated, see medications listed above.  - Patient's afib is currently controlled  - Previously had afib, and on Eliquis once a day because of bruising?  As she is being monitored will put on appropriate BID dosing    Bacteremia due to Enterococcus  See severe sepsis      Catheter-associated urinary tract infection  See severe sepsis      Type 2 diabetes mellitus treated with insulin  HbA1c 6.2, sugars controlled  Home DM regimen:  Largine 15 qHS  Hold scheduled insulin for now with AMS and decreased oral intake  SSI with POCT accuchecks to achieve blood glucose of 140-180 while hospitalized  Hypoglycemia protocol  Diabetic diet        Prophylactic use of anastrozole (Arimidex)  On Arimidex for history of breast cancer      Obesity, diabetes, and hypertension syndrome  Noted    Chronic pain  2/2 lumbar spondylosis  See above      Lumbar spondylosis  Chronic issue  Continue Cymbalta  Continue Robaxin TID PRN  Continue Norco prn      Class 1 obesity due to excess calories with serious comorbidity in adult  Body mass index is 32.54 kg/m². Morbid obesity complicates all aspects of disease management from diagnostic modalities to treatment. Weight loss encouraged and health benefits explained to patient.         Major depressive disorder, recurrent, moderate  Patient has recurrent depression which is moderate and is currently controlled. Will Continue anti-depressant medications. We will not consult psychiatry at this time. Patient does not display psychosis at this time. Continue to monitor closely and adjust plan of care as needed.      Chronic and stable  Continue Cymbalta      Neurogenic bladder  Chronic issue  Has suprapubic cath, replaced 12/31  Continue Oxybutynin      Hyponatremia  Hyponatremia is likely due to volume overload from ESRD. The patient's most recent sodium results are listed below.  Recent Labs     01/04/25  0839 01/06/25  0439   * 125*        Plan  - Correct the sodium by 4-6mEq in 24 hours.   - Will treat the hyponatremia with HD  - Monitor sodium Daily.   - Patient hyponatremia is stable    Hyperlipidemia  Chronic and stable  Continue Lipitor    Hypothyroidism  Chronic and stable  Continue Synthroid  TSH 1.631        VTE Risk Mitigation (From admission, onward)           Ordered     apixaban tablet 5 mg  2 times daily         01/08/25 1045     heparin (porcine) injection 1,000 Units  As needed (PRN)         01/01/25 1610                    Discharge Planning   FLORENTINO: 1/10/2025     Code Status: Full Code   Medical Readiness for Discharge Date:   Discharge Plan A: Home Health                        Marcela Vicente MD  Department of Hospital Medicine   [facility] - Internal Medicine Telemetry

## 2025-01-08 NOTE — SUBJECTIVE & OBJECTIVE
Interval History:     Review of Systems  Objective:     Vital Signs (Most Recent):  Temp: 98.1 °F (36.7 °C) (01/08/25 1141)  Pulse: 76 (01/08/25 1141)  Resp: 18 (01/08/25 1146)  BP: (!) 129/57 (01/08/25 1141)  SpO2: 99 % (01/08/25 1141) Vital Signs (24h Range):  Temp:  [97.7 °F (36.5 °C)-98.6 °F (37 °C)] 98.1 °F (36.7 °C)  Pulse:  [65-83] 76  Resp:  [17-18] 18  SpO2:  [95 %-100 %] 99 %  BP: (117-188)/(57-78) 129/57     Weight: 97.1 kg (214 lb)  Body mass index is 32.54 kg/m².    Intake/Output Summary (Last 24 hours) at 1/8/2025 1339  Last data filed at 1/8/2025 0213  Gross per 24 hour   Intake 240 ml   Output 400 ml   Net -160 ml      Physical Exam  Constitutional:       General: She is not in acute distress.  Cardiovascular:      Rate and Rhythm: Normal rate.      Pulses: Normal pulses.   Pulmonary:      Effort: No respiratory distress.      Breath sounds: Normal breath sounds. No wheezing.   Abdominal:      General: Bowel sounds are normal. There is no distension.      Palpations: Abdomen is soft.      Tenderness: There is no abdominal tenderness.   Musculoskeletal:      Right lower leg: No edema.      Left lower leg: No edema.   Neurological:      Mental Status: She is alert and oriented to person, place, and time. Mental status is at baseline.         MELD 3.0: 25 at 1/2/2025  5:26 AM  MELD-Na: 25 at 1/2/2025  5:26 AM  Calculated from:  Serum Creatinine: On dialysis. Using the maximum value.  Serum Sodium: 130 mmol/L at 1/2/2025  5:26 AM  Total Bilirubin: 0.2 mg/dL (Using min of 1 mg/dL) at 1/2/2025  5:26 AM  Serum Albumin: 2.3 g/dL at 1/2/2025  5:26 AM  INR(ratio): 1 at 12/31/2024  9:37 AM  Age at listing (hypothetical): 74 years  Sex: Female at 1/2/2025  5:26 AM      Significant Labs:  CBC:  Recent Labs   Lab 01/07/25  0336 01/08/25  0232   WBC 7.15 7.94   HGB 9.2* 9.5*   HCT 28.9* 29.9*    276     CMP:  Recent Labs   Lab 01/07/25  0336 01/08/25  0232   * 124*   K 3.9 4.1   CL 97 93*   CO2 22* 19*  "  * 151*   BUN 19 29*   CREATININE 2.0* 2.6*   CALCIUM 8.3* 8.2*   PROT 5.9* 6.4   ALBUMIN 2.4* 2.7*   BILITOT 0.2 0.3   ALKPHOS 78 85   AST 14 21   ALT 15 14   ANIONGAP 8 12     PTINR:  No results for input(s): "INR" in the last 48 hours.    "

## 2025-01-08 NOTE — CONSULTS
Interventional Radiology  Consult/History & Physical Note    Consult Requested By: Marcela Vicente MD   Reason for Consult: TDC placement    SUBJECTIVE:     Chief Complaint:  ESRD    History of Present Illness:  Cecile Bowen is a 74 y.o. female with a PMHx of ESRD on HD, T2DM, afib on Eliquis, HTN, obesity, history of breast cancer, chronic suprapubic catheter for neurogenic bladder and chronic back pain  who was admitted on 12/31/2024 for concerns for catheter associated infection secondary to altered mental status. Hospital course notable for sepsis secondary to strep bacteremia and suprapubic UTI; her previous TDC was removed on 1/7/2025 for a line holiday. Interventional Radiology has been consulted for TDC placement for HD access. Pt has initiated HD and currently does not have a temporary HD line in place. Her last HD session was on 1/6/2025. The pt has had a TDC in the past. The pt's Cr is 2.6 and her K is 4.1. Her WBC is 7.94 from 1/8/2025, last set of blood cultures on 1/2/2025 revealed no growth. Pt is afebrile and hemodynamically stable. She denies a history of VIJAYA requiring nightly CPAP and difficulty breathing when lying flat.She has not been NPO since midnight.    Review of Systems   Constitutional:  Negative for chills and fever.   Respiratory:  Negative for cough and shortness of breath.    Cardiovascular:  Positive for leg swelling. Negative for chest pain.   Gastrointestinal:  Negative for abdominal pain.       Scheduled Meds:   amLODIPine  10 mg Oral Daily    anastrozole  1 mg Oral Daily    apixaban  5 mg Oral BID    atorvastatin  80 mg Oral Daily    carvediloL  12.5 mg Oral BID    DULoxetine  40 mg Oral Daily    epoetin chloe-ebpx (RETACRIT) injection  50 Units/kg Intravenous Every Mon, Wed, Fri    ipratropium  2 spray Each Nostril BID    levothyroxine  50 mcg Oral Before breakfast    losartan  100 mg Oral Daily    oxybutynin  10 mg Oral Daily    sevelamer carbonate  800 mg Oral TID WM     sodium bicarbonate  650 mg Oral Daily     Continuous Infusions:  PRN Meds:  Current Facility-Administered Medications:     acetaminophen, 650 mg, Oral, Q4H PRN    dextrose 50%, 12.5 g, Intravenous, PRN    dextrose 50%, 12.5 g, Intravenous, PRN    dextrose 50%, 25 g, Intravenous, PRN    dextrose 50%, 25 g, Intravenous, PRN    glucagon (human recombinant), 1 mg, Intramuscular, PRN    glucagon (human recombinant), 1 mg, Intramuscular, PRN    glucose, 16 g, Oral, PRN    glucose, 16 g, Oral, PRN    glucose, 24 g, Oral, PRN    glucose, 24 g, Oral, PRN    heparin (porcine), 1,000 Units, Intra-Catheter, PRN    HYDROcodone-acetaminophen, 1 tablet, Oral, Q4H PRN    insulin aspart U-100, 1-10 Units, Subcutaneous, QID (AC + HS) PRN    methocarbamoL, 750 mg, Oral, TID PRN    naloxone, 0.02 mg, Intravenous, PRN    ondansetron, 8 mg, Intravenous, Q6H PRN    polyethylene glycol, 17 g, Oral, Daily PRN    sodium chloride 0.9%, 5 mL, Intravenous, PRN    traZODone, 100 mg, Oral, Nightly PRN    Pharmacy to dose Vancomycin consult, , , Once **AND** vancomycin - pharmacy to dose, , Intravenous, pharmacy to manage frequency    Review of patient's allergies indicates:  No Known Allergies    Past Medical History:   Diagnosis Date    Cervicogenic migraine     Chronic pain     CKD (chronic kidney disease) stage 4, GFR 15-29 ml/min     [facility]    CKD (chronic kidney disease) stage 4, GFR 15-29 ml/min     Diabetes mellitus     Long term use of Insulin, Diabetic Neuropathy    Dialysis patient 1/21/2022    Fibromyalgia     Hydronephrosis     Hyperlipidemia     Hypertension 12/12/2012    Hypothyroidism 12/12/2012    ARON (iron deficiency anemia)     Insomnia     Levoscoliosis     Malignant neoplasm of upper-outer quadrant of left breast in female, estrogen receptor positive     Metabolic acidosis     Mobility impaired     Nuclear sclerosis - Both Eyes 3/24/2014    VIJAYA (obstructive sleep apnea)     Osteopenia     Pulmonary nodule      Recurrent UTI     Renal manifestation of secondary diabetes mellitus     Secondary hyperparathyroidism, renal     Urinary retention      Past Surgical History:   Procedure Laterality Date    ANGIOGRAPHY OF UPPER EXTREMITY N/A 9/19/2024    Procedure: Angiogram Extremity Bilateral;  Surgeon: RODRIGO Friedman III, MD;  Location: [facility] OR 2ND FLR;  Service: Vascular;  Laterality: N/A;  R femoral approach, arch & arm angiogram  168.76mgy  33.1145 gycm2  8.9min    AV FISTULA PLACEMENT Left 2/14/2024    Procedure: CREATION, AV FISTULA;  Surgeon: RODRIGO Friedman III, MD;  Location: [facility] OR 2ND FLR;  Service: Vascular;  Laterality: Left;  LUE AVF creation vs AVG insertion    BIOPSY OF URETER Left 2/18/2022    Procedure: BIOPSY, URETER;  Surgeon: Ronel Whittington MD;  Location: [facility] OR 1ST FLR;  Service: Urology;  Laterality: Left;    BREAST BIOPSY Right     benign    CHOLECYSTECTOMY      COLONOSCOPY N/A 1/13/2017    Procedure: COLONOSCOPY;  Surgeon: Morris Wiseman MD;  Location: [facility] ENDO (4TH FLR);  Service: Endoscopy;  Laterality: N/A;  Renal pt Nephrology advised to avoid phosphate preps    COLONOSCOPY N/A 12/7/2023    Procedure: COLONOSCOPY;  Surgeon: Herve Allen MD;  Location: [facility] ENDO (2ND FLR);  Service: Endoscopy;  Laterality: N/A;  HD MWF; labs prior  suprapubic catheter  pt does not ambulate-uses christina lift- will have sling with her  9/7 ref. by Anastasiia Campbell, , PEG, instr. mailed, Eliquis hold approval pending-[facility] to hold Eliquis 2 days per Dr Navarro-GT  1/30-precall complete-MS    CYSTOSCOPY N/A 10/8/2018    Procedure: CYSTOSCOPY;  Surgeon: Ronel Whittington MD;  [facility];  Service: Urology;  Laterality: N/A;  45 min    CYSTOSCOPY N/A 3/25/2019    Procedure: CYSTOSCOPY;  Surgeon: Ronel Whittington MD;  [facility];  Service: Urology;  Laterality: N/A;  45 min    CYSTOSCOPY N/A 8/26/2019    Procedure: CYSTOSCOPY;  Surgeon: Ronel FRAZIER  MD Pk;  [facility];  Service: Urology;  Laterality: N/A;  45 min    CYSTOSCOPY N/A 7/2/2021    Procedure: CYSTOSCOPY;  Surgeon: Ronel Whittington MD;  [facility];  Service: Urology;  Laterality: N/A;    CYSTOSCOPY  12/23/2021    Procedure: CYSTOSCOPY;  Surgeon: Ronel Whittington MD;  Location: [facility] OR [address] [place] [address];  Service: Urology;;    CYSTOSCOPY  2/18/2022    Procedure: CYSTOSCOPY;  Surgeon: Ronel Whittington MD;  Location: [facility] OR [address] [place] [address];  Service: Urology;;    CYSTOSCOPY W/ URETERAL STENT PLACEMENT Left 5/15/2021    Procedure: CYSTOSCOPY, WITH URETERAL STENT INSERTION;  Surgeon: Levy Sánchez Jr., MD;  Location: [facility] OR [address] [place] [address];  Service: Urology;  Laterality: Left;    CYSTOSCOPY WITH BIOPSY OF BLADDER N/A 1/27/2020    Procedure: CYSTOSCOPY, WITH BLADDER BIOPSY, WITH FULGURATION IF INDICATED;  Surgeon: Ronel Whittington MD;  Location: [facility] OR [address] [place] [address];  Service: Urology;  Laterality: N/A;    DILATION AND CURETTAGE OF UTERUS      FISTULOGRAM N/A 4/29/2024    Procedure: Fistulogram;  Surgeon: RODRIGO Friedman III, MD;  Location: [facility] CATH LAB;  Service: Peripheral Vascular;  Laterality: N/A;    FISTULOGRAM Left 1/6/2025    Procedure: Fistulogram;  Surgeon: RODRIGO Friedman III, MD;  Location: [facility] CATH LAB;  Service: Peripheral Vascular;  Laterality: Left;    GALLBLADDER SURGERY  2006    HYSTERECTOMY      INJECTION FOR SENTINEL NODE IDENTIFICATION Left 8/1/2019    Procedure: INJECTION, FOR SENTINEL NODE IDENTIFICATION;  Surgeon: Samia Fulton MD;  Location: [facility] OR 2ND [facility];  Service: General;  Laterality: Left;    INJECTION OF BOTULINUM TOXIN TYPE A N/A 10/8/2018    Procedure: INJECTION, BOTULINUM TOXIN, TYPE A 300 UNITS;  Surgeon: Ronel Whittington MD;  Location: [facility] OR [address] [place] [address];  Service: Urology;  Laterality: N/A;    INJECTION OF BOTULINUM TOXIN TYPE A N/A 3/25/2019    Procedure: INJECTION, BOTULINUM TOXIN, TYPE A 300 UNITS;  Surgeon: Ronel FRAZIER  MD Pk;  [facility];  Service: Urology;  Laterality: N/A;    INJECTION OF BOTULINUM TOXIN TYPE A N/A 8/26/2019    Procedure: INJECTION, BOTULINUM TOXIN, TYPE A 300 UNITS;  Surgeon: Ronel Whittington MD;  Location: [facility] OR 1ST FLR;  Service: Urology;  Laterality: N/A;    MASTECTOMY Left 8/1/2019    Procedure: MASTECTOMY 23 hour stay;  Surgeon: Samia Fulton MD;  Location: [facility] OR 2ND FLR;  Service: General;  Laterality: Left;    MEDIPORT REMOVAL Right 6/24/2022    Procedure: REMOVAL, CATHETER, CENTRAL VENOUS, TUNNELED, WITH PORT;  Surgeon: Isauro Anderson MD;  Location: [facility] CATH LAB;  Service: Radiology;  Laterality: Right;    OVARIAN CYST SURGERY  1985    PERCUTANEOUS TRANSLUMINAL ANGIOPLASTY OF ARTERIOVENOUS FISTULA Left 4/29/2024    Procedure: PTA, AV FISTULA;  Surgeon: RODRIGO Friedman III, MD;  Location: [facility] CATH LAB;  Service: Peripheral Vascular;  Laterality: Left;    REPLACEMENT OF STENT Left 12/23/2021    Procedure: REPLACEMENT, STENT;  Surgeon: Ronel Whittington MD;  [facility];  Service: Urology;  Laterality: Left;    REPLACEMENT OF STENT Left 2/18/2022    Procedure: REPLACEMENT, STENT;  Surgeon: Ronel Whittington MD;  [facility];  Service: Urology;  Laterality: Left;    RETROGRADE PYELOGRAPHY Left 2/18/2022    Procedure: PYELOGRAM, RETROGRADE;  Surgeon: Ronel Whittington MD;  [facility];  Service: Urology;  Laterality: Left;    SENTINEL LYMPH NODE BIOPSY Left 8/1/2019    Procedure: BIOPSY, LYMPH NODE, SENTINEL;  Surgeon: Samia Fulton MD;  Location: [facility] OR 2ND FLR;  Service: General;  Laterality: Left;    spt placement      TONSILLECTOMY, ADENOIDECTOMY      TOTAL ABDOMINAL HYSTERECTOMY W/ BILATERAL SALPINGOOPHORECTOMY  1985    URETEROSCOPY Left 2/18/2022    Procedure: URETEROSCOPY;  Surgeon: Ronel Whittington MD;  Location: [facility] OR [address] [place] [address];  Service: Urology;  Laterality: Left;     Family History    Problem Relation Name Age of Onset    Diabetes Sister Kat     Kidney disease Sister Kat         CKD III    ALS Mother          d.    Cancer Maternal Grandmother          d. colon    Cancer Paternal Grandfather          d. lung    Scoliosis Brother [place]         increased pain    Prostate cancer Brother [place]         cured s/p surgery    Cancer Brother [place]         rare cancer that got into the bones    Diabetes Maternal Aunt      Kidney disease Maternal Aunt      Diabetes Maternal Uncle      Amblyopia Neg Hx      Blindness Neg Hx      Cataracts Neg Hx      Glaucoma Neg Hx      Macular degeneration Neg Hx      Retinal detachment Neg Hx      Strabismus Neg Hx       Social History     Tobacco Use    Smoking status: Never    Smokeless tobacco: Never   Substance Use Topics    Alcohol use: No     Alcohol/week: 0.0 standard drinks of alcohol    Drug use: No       OBJECTIVE:     Vital Signs (Most Recent)  Temp: 98.1 °F (36.7 °C) (01/08/25 1141)  Pulse: 76 (01/08/25 1141)  Resp: 18 (01/08/25 1146)  BP: (!) 129/57 (01/08/25 1141)  SpO2: 99 % (01/08/25 1141)    Physical Exam:  Physical Exam  Constitutional:       General: She is not in acute distress.  HENT:      Head: Normocephalic and atraumatic.   Cardiovascular:      Pulses: Normal pulses.   Pulmonary:      Effort: Pulmonary effort is normal. No respiratory distress.   Abdominal:      General: There is no distension.   Skin:     General: Skin is warm and dry.   Neurological:      Mental Status: She is alert and oriented to person, place, and time.         Laboratory  I have reviewed all pertinent lab results within the past 24 hours.    ASA/Mallampati  ASA: 3  Mallampati: 1    Imaging:  Recent imaging studies reviewed.     ASSESSMENT/PLAN:     Assessment:  74 y.o. female with a PMHx of ESRD on HD, T2DM, afib on Eliquis, HTN, obesity, history of breast cancer, chronic suprapubic catheter for neurogenic bladder and chronic back pain who has been referred to IR for TDC  placement for HD access. The procedure was discussed in great detail with the patient including thorough explanations of the potential risks and benefits of TDC placement. Risks include bleeding at the puncture site, infection, catheter related thrombus, catheter dysfunction, central vein stenosis and need for additional procedures. The patient is a candidate for TDC placement under moderate sedation. Plan discussed with ordering physician and pt verbalized understanding of the plan and would like to proceed.    Plan:  Will proceed with TDC placement under moderate sedation on 1/9/2025.   Please keep pt NPO starting at midnight on 1/9/2025.   Anticoagulation history reviewed.   Coagulation labs reviewed. INR 1.0 on 12/31/2024 and plt count 276 on 1/8/2025.  Thank you for the consult. Please contact with questions via [employer] secure chat    DIANN Schultz, JOCELINEP  Interventional Radiology

## 2025-01-08 NOTE — SUBJECTIVE & OBJECTIVE
Interval History:   Sp line removal yesterday, holding HD unit CVC placement with IR. No distress this AM. K 4.1.  24 hr  mL. No distress on exam. Oxygenating well on RA.     Review of patient's allergies indicates:  No Known Allergies  Current Facility-Administered Medications   Medication Frequency    acetaminophen tablet 650 mg Q4H PRN    amLODIPine tablet 10 mg Daily    anastrozole tablet 1 mg Daily    [START ON 1/9/2025] apixaban tablet 5 mg BID    atorvastatin tablet 80 mg Daily    carvediloL tablet 12.5 mg BID    dextrose 50% injection 12.5 g PRN    dextrose 50% injection 12.5 g PRN    dextrose 50% injection 25 g PRN    dextrose 50% injection 25 g PRN    DULoxetine DR capsule 40 mg Daily    epoetin chloe-epbx injection 4,900 Units Every Mon, Wed, Fri    glucagon (human recombinant) injection 1 mg PRN    glucagon (human recombinant) injection 1 mg PRN    glucose chewable tablet 16 g PRN    glucose chewable tablet 16 g PRN    glucose chewable tablet 24 g PRN    glucose chewable tablet 24 g PRN    heparin (porcine) injection 1,000 Units PRN    HYDROcodone-acetaminophen  mg per tablet 1 tablet Q4H PRN    insulin aspart U-100 pen 1-10 Units QID (AC + HS) PRN    ipratropium 42 mcg (0.06 %) nasal spray 2 spray BID    levothyroxine tablet 50 mcg Before breakfast    losartan tablet 100 mg Daily    methocarbamoL tablet 750 mg TID PRN    naloxone 0.4 mg/mL injection 0.02 mg PRN    ondansetron injection 8 mg Q6H PRN    oxybutynin 24 hr tablet 10 mg Daily    polyethylene glycol packet 17 g Daily PRN    sevelamer carbonate tablet 800 mg TID WM    sodium bicarbonate tablet 650 mg Daily    sodium chloride 0.9% flush 5 mL PRN    traZODone tablet 100 mg Nightly PRN    vancomycin - pharmacy to dose pharmacy to manage frequency     Facility-Administered Medications Ordered in Other Encounters   Medication Frequency    epoetin chloe injection 2,000 Units 1 time in Clinic/HOD    heparin (porcine) injection 2,000 Units  Once    heparin (porcine) injection 2,000 Units Once    heparin (porcine) injection 4,000 Units 1 time in Clinic/HOD    iron sucrose injection 100 mg 1 time in Clinic/HOD       Objective:     Vital Signs (Most Recent):  Temp: 97.7 °F (36.5 °C) (01/08/25 0409)  Pulse: 72 (01/08/25 0945)  Resp: 17 (01/08/25 0945)  BP: (!) 188/71 (01/08/25 0945)  SpO2: 100 % (01/08/25 0945) Vital Signs (24h Range):  Temp:  [97.7 °F (36.5 °C)-98.6 °F (37 °C)] 97.7 °F (36.5 °C)  Pulse:  [65-83] 72  Resp:  [17-21] 17  SpO2:  [95 %-100 %] 100 %  BP: (117-188)/(61-81) 188/71     Weight: 97.1 kg (214 lb) (01/01/25 0538)  Body mass index is 32.54 kg/m².  Body surface area is 2.16 meters squared.    I/O last 3 completed shifts:  In: 830.1 [P.O.:480; I.V.:350.1]  Out: 400 [Urine:400]     Physical Exam  Vitals and nursing note reviewed.   Constitutional:       Appearance: She is ill-appearing.   HENT:      Head: Normocephalic.   Eyes:      Conjunctiva/sclera: Conjunctivae normal.   Cardiovascular:      Rate and Rhythm: Normal rate.      Arteriovenous access: Left arteriovenous access is present.     Comments: LUE AVF + thrill  Pulmonary:      Effort: Pulmonary effort is normal. No respiratory distress.   Abdominal:      Palpations: Abdomen is soft.      Tenderness: There is no abdominal tenderness.   Musculoskeletal:      Right lower leg: Edema present.      Left lower leg: Edema present.   Skin:     General: Skin is warm and dry.   Neurological:      Mental Status: She is alert.   Psychiatric:         Mood and Affect: Mood normal.          Significant Labs:  CBC:   Recent Labs   Lab 01/08/25  0232   WBC 7.94   RBC 3.22*   HGB 9.5*   HCT 29.9*      MCV 93   MCH 29.5   MCHC 31.8*     CMP:   Recent Labs   Lab 01/08/25  0232   *   CALCIUM 8.2*   ALBUMIN 2.7*   PROT 6.4   *   K 4.1   CO2 19*   CL 93*   BUN 29*   CREATININE 2.6*   ALKPHOS 85   ALT 14   AST 21   BILITOT 0.3     All labs within the past 24 hours have been reviewed.

## 2025-01-08 NOTE — PROGRESS NOTES
Pharmacokinetic Assessment Follow Up: IV Vancomycin    Vancomycin serum concentration assessment(s)/Regimen Plan:    Vancomycin random 18.7  Level is within the desired definitive target range of 15 to 20 mcg/mL.  HD given 1/7  Vancomycin level currently w/in therapeutic range  HD not ordered for today  Random ordered 1/10 w/AM labs- or sooner if needed    Drug levels (last 3 results):  Recent Labs   Lab Result Units 01/06/25  0439 01/08/25  0232   Vancomycin, Random ug/mL 21.7 18.7       Pharmacy will continue to follow and monitor vancomycin.    Please contact pharmacy at extension [phone] for questions regarding this assessment.    Thank you for the consult,   Melida Roche       Patient brief summary:  Cecile Bowen is a 74 y.o. female initiated on antimicrobial therapy with IV Vancomycin for treatment of bacteremia    The patient's current regimen is Pulse dose    Drug Allergies:   Review of patient's allergies indicates:  No Known Allergies    Actual Body Weight:   97.1 kg    Renal Function:   Estimated Creatinine Clearance: 23.8 mL/min (A) (based on SCr of 2.6 mg/dL (H)).,     Dialysis Method (if applicable):  intermittent HD    CBC (last 72 hours):  Recent Labs   Lab Result Units 01/06/25 0439 01/07/25  0336 01/08/25  0232   WBC K/uL 7.80 7.15 7.94   Hemoglobin g/dL 9.4* 9.2* 9.5*   Hematocrit % 30.1* 28.9* 29.9*   Platelets K/uL 249 227 276   Gran % % 54.8 59.0 55.8   Lymph % % 26.2 21.8 24.2   Mono % % 11.5 13.4 13.9   Eosinophil % % 4.5 3.9 4.5   Basophil % % 0.8 0.6 0.5   Differential Method  Automated Automated Automated       Metabolic Panel (last 72 hours):  Recent Labs   Lab Result Units 01/06/25 0439 01/07/25  0336 01/08/25  0232   Sodium mmol/L 125* 127* 124*   Potassium mmol/L 4.2 3.9 4.1   Chloride mmol/L 94* 97 93*   CO2 mmol/L 22* 22* 19*   Glucose mg/dL 133* 124* 151*   BUN mg/dL 35* 19 29*   Creatinine mg/dL 2.9* 2.0* 2.6*   Albumin g/dL 2.4* 2.4* 2.7*   Total Bilirubin mg/dL 0.3 0.2 0.3    Alkaline Phosphatase U/L 78 78 85   AST U/L 15 14 21   ALT U/L 15 15 14   Magnesium mg/dL 1.6 1.6 1.6   Phosphorus mg/dL 5.6* 4.2 4.5       Vancomycin Administrations:  vancomycin given in the last 96 hours                     vancomycin (VANCOCIN) 500 mg in D5W 100 mL IVPB (MB+) (mg) 500 mg New Bag 01/06/25 1859    vancomycin 1,250 mg in 0.9% NaCl 250 mL IVPB (admixture device) (mg) 1,250 mg New Bag 01/04/25 2314                    Microbiologic Results:  Microbiology Results (last 7 days)       Procedure Component Value Units Date/Time    Blood culture [[id number]] Collected: 01/02/25 0529    Order Status: Completed Specimen: Blood from Peripheral, Hand, Right Updated: 01/07/25 0812     Blood Culture, Routine No growth after 5 days.    Blood culture [[id number]] Collected: 01/02/25 0526    Order Status: Completed Specimen: Blood from Peripheral, Antecubital, Right Updated: 01/07/25 0812     Blood Culture, Routine No growth after 5 days.    Urine culture [[id number]]  (Abnormal)  (Susceptibility) Collected: 12/31/24 1049    Order Status: Completed Specimen: Urine Updated: 01/05/25 1445     Urine Culture, Routine KLEBSIELLA PNEUMONIAE ESBL  50,000 - 99,999 cfu/ml        PROTEUS MIRABILIS  10,000 - 49,999 cfu/ml      Narrative:      Specimen Source->Urine    Blood culture x two cultures. Draw prior to antibiotics. [[id number]]  (Abnormal) Collected: 12/31/24 0937    Order Status: Completed Specimen: Blood from Peripheral, Antecubital, Right Updated: 01/03/25 1431     Blood Culture, Routine Gram stain aer bottle: Gram positive cocci in chains resembling Strep      Gram stain zelda bottle: Gram positive cocci in chains resembling Strep      Results called to and read back by: Sean Hernandez RN  01/01/2025  10:31      ENTEROCOCCUS FAECALIS  For susceptibility see order #[id number]      Narrative:      Aerobic and anaerobic    Blood culture x two cultures. Draw prior to antibiotics. [[id number]]  (Abnormal)   (Susceptibility) Collected: 12/31/24 0937    Order Status: Completed Specimen: Blood from Peripheral, Antecubital, Right Updated: 01/03/25 1429     Blood Culture, Routine Gram stain aer bottle: Gram positive cocci in chains resembling Strep      Gram stain zelda bottle: Gram positive cocci in chains resembling Strep      Results called to and read back by: Sean Hernandez RN  01/01/2025  10:31      ENTEROCOCCUS FAECALIS    Narrative:      Aerobic and anaerobic    Rapid Organism ID by PCR (from Blood culture) [[id number]]  (Abnormal) Collected: 12/31/24 0937    Order Status: Completed Updated: 01/01/25 1149     Enterococcus faecalis Detected     Enterococcus faecium Not Detected     Listeria monocytogenes Not Detected     Staphylococcus spp. Not Detected     Staphylococcus aureus Not Detected     Staphylococcus epidermidis Not Detected     Staphylococcus lugdunensis Not Detected     Streptococcus species Not Detected     Streptococcus agalactiae Not Detected     Streptococcus pneumoniae Not Detected     Streptococcus pyogenes Not Detected     Acinetobacter calcoaceticus/baumannii complex Not Detected     Bacteroides fragilis Not Detected     Enterobacterales Not Detected     Enterobacter cloacae complex Not Detected     Escherichia coli Not Detected     Klebsiella aerogenes Not Detected     Klebsiella oxytoca Not Detected     Klebsiella pneumoniae group Not Detected     Proteus Not Detected     Salmonella sp Not Detected     Serratia marcescens Not Detected     Haemophilus influenzae Not Detected     Neisseria meningtidis Not Detected     Pseudomonas aeruginosa Not Detected     Stenotrophomonas maltophilia Not Detected     Candida albicans Not Detected     Candida auris Not Detected     Candida glabrata Not Detected     Candida krusei Not Detected     Candida parapsilosis Not Detected     Candida tropicalis Not Detected     Cryptococcus neoformans/gattii Not Detected     CTX-M (ESBL ) Test Not Applicable     IMP  (Carbapenem resistant) Test Not Applicable     KPC resistance gene (Carbapenem resistant) Test Not Applicable     mcr-1  Test Not Applicable     mec A/C  Test Not Applicable     mec A/C and MREJ (MRSA) gene Test Not Applicable     NDM (Carbapenem resistant) Test Not Applicable     OXA-48-like (Carbapenem resistant) Test Not Applicable     van A/B (VRE gene) Not Detected     VIM (Carbapenem resistant) Test Not Applicable    Narrative:      Aerobic and anaerobic

## 2025-01-08 NOTE — PROGRESS NOTES
[facility] Telemetry  Nephrology  Progress Note    Patient Name: Cecile Bowen  MRN: [id number]  Admission Date: 12/31/2024  Hospital Length of Stay: 8 days  Attending Provider: Marcela Vicente,*   Primary Care Physician: Lurdes Navarro MD  Principal Problem:Severe sepsis    Subjective:     Interval History:   Sp line removal yesterday, holding HD unit CVC placement with IR. No distress this AM. K 4.1.  24 hr  mL. No distress on exam. Oxygenating well on RA.     Review of patient's allergies indicates:  No Known Allergies  Current Facility-Administered Medications   Medication Frequency    acetaminophen tablet 650 mg Q4H PRN    amLODIPine tablet 10 mg Daily    anastrozole tablet 1 mg Daily    [START ON 1/9/2025] apixaban tablet 5 mg BID    atorvastatin tablet 80 mg Daily    carvediloL tablet 12.5 mg BID    dextrose 50% injection 12.5 g PRN    dextrose 50% injection 12.5 g PRN    dextrose 50% injection 25 g PRN    dextrose 50% injection 25 g PRN    DULoxetine DR capsule 40 mg Daily    epoetin chloe-epbx injection 4,900 Units Every Mon, Wed, Fri    glucagon (human recombinant) injection 1 mg PRN    glucagon (human recombinant) injection 1 mg PRN    glucose chewable tablet 16 g PRN    glucose chewable tablet 16 g PRN    glucose chewable tablet 24 g PRN    glucose chewable tablet 24 g PRN    heparin (porcine) injection 1,000 Units PRN    HYDROcodone-acetaminophen  mg per tablet 1 tablet Q4H PRN    insulin aspart U-100 pen 1-10 Units QID (AC + HS) PRN    ipratropium 42 mcg (0.06 %) nasal spray 2 spray BID    levothyroxine tablet 50 mcg Before breakfast    losartan tablet 100 mg Daily    methocarbamoL tablet 750 mg TID PRN    naloxone 0.4 mg/mL injection 0.02 mg PRN    ondansetron injection 8 mg Q6H PRN    oxybutynin 24 hr tablet 10 mg Daily    polyethylene glycol packet 17 g Daily PRN    sevelamer carbonate tablet 800 mg TID WM    sodium bicarbonate tablet 650 mg Daily     sodium chloride 0.9% flush 5 mL PRN    traZODone tablet 100 mg Nightly PRN    vancomycin - pharmacy to dose pharmacy to manage frequency     Facility-Administered Medications Ordered in Other Encounters   Medication Frequency    epoetin chloe injection 2,000 Units 1 time in Clinic/HOD    heparin (porcine) injection 2,000 Units Once    heparin (porcine) injection 2,000 Units Once    heparin (porcine) injection 4,000 Units 1 time in Clinic/HOD    iron sucrose injection 100 mg 1 time in Clinic/HOD       Objective:     Vital Signs (Most Recent):  Temp: 97.7 °F (36.5 °C) (01/08/25 0409)  Pulse: 72 (01/08/25 0945)  Resp: 17 (01/08/25 0945)  BP: (!) 188/71 (01/08/25 0945)  SpO2: 100 % (01/08/25 0945) Vital Signs (24h Range):  Temp:  [97.7 °F (36.5 °C)-98.6 °F (37 °C)] 97.7 °F (36.5 °C)  Pulse:  [65-83] 72  Resp:  [17-21] 17  SpO2:  [95 %-100 %] 100 %  BP: (117-188)/(61-81) 188/71     Weight: 97.1 kg (214 lb) (01/01/25 0538)  Body mass index is 32.54 kg/m².  Body surface area is 2.16 meters squared.    I/O last 3 completed shifts:  In: 830.1 [P.O.:480; I.V.:350.1]  Out: 400 [Urine:400]     Physical Exam  Vitals and nursing note reviewed.   Constitutional:       Appearance: She is ill-appearing.   HENT:      Head: Normocephalic.   Eyes:      Conjunctiva/sclera: Conjunctivae normal.   Cardiovascular:      Rate and Rhythm: Normal rate.      Arteriovenous access: Left arteriovenous access is present.     Comments: LUE AVF + thrill  Pulmonary:      Effort: Pulmonary effort is normal. No respiratory distress.   Abdominal:      Palpations: Abdomen is soft.      Tenderness: There is no abdominal tenderness.   Musculoskeletal:      Right lower leg: Edema present.      Left lower leg: Edema present.   Skin:     General: Skin is warm and dry.   Neurological:      Mental Status: She is alert.   Psychiatric:         Mood and Affect: Mood normal.          Significant Labs:  CBC:   Recent Labs   Lab 01/08/25  0232   WBC 7.94   RBC 3.22*    HGB 9.5*   HCT 29.9*      MCV 93   MCH 29.5   MCHC 31.8*     CMP:   Recent Labs   Lab 01/08/25  0232   *   CALCIUM 8.2*   ALBUMIN 2.7*   PROT 6.4   *   K 4.1   CO2 19*   CL 93*   BUN 29*   CREATININE 2.6*   ALKPHOS 85   ALT 14   AST 21   BILITOT 0.3     All labs within the past 24 hours have been reviewed.     Assessment/Plan:     Renal/  ESRD (end stage renal disease) on dialysis  74 year old female hx of ESRD on HD MWF presenting for AMS and fever, concern for sepsis.     Nephrology History  iHD Schedule: MF, now MWF per patient  Unit/MD: [facility]/Dr. Hare   Duration: 4 hours   EDW: 98 kg.   Access: JENNY AVF (has TDC scheduled to be removed)  Residual Renal Function: +++    Plan/Recommendations  -No emergent need for HD today;sp line removal yesterday. On 24-48 hr line holiday. Plans for CVC with IR. Will hold HD until line placement tomorrow or Friday. Will monitor closely until line placement.   -Hgb goal 10-11, hgb 9.5. EPO with HD.   -Strict I&Os  -phos 4.5, on binders.  -Renal diet if not NPO     ID  * Severe sepsis  -management per primary        Thank you for your consult. I will follow-up with patient. Please contact us if you have any additional questions.    Mayra Porter, MUNA, FNP-C  Nephrology  [facility] Tele[facility]

## 2025-01-08 NOTE — ASSESSMENT & PLAN NOTE
74 year old female hx of ESRD on HD MWF presenting for AMS and fever, concern for sepsis.     Nephrology History  iHD Schedule: MF, now MWF per patient  Unit/MD: [facility]/Dr. Hare   Duration: 4 hours   EDW: 98 kg.   Access: JENNY AVF (has TDC scheduled to be removed)  Residual Renal Function: +++    Plan/Recommendations  -No emergent need for HD today;sp line removal yesterday. On 24-48 hr line holiday. Plans for CVC with IR. Will hold HD until line placement tomorrow or Friday. Will monitor closely until line placement.   -Hgb goal 10-11, hgb 9.5. EPO with HD.   -Strict I&Os  -phos 4.5, on binders.  -Renal diet if not NPO

## 2025-01-09 LAB
ALBUMIN SERPL BCP-MCNC: 2.3 G/DL (ref 3.5–5.2)
ALP SERPL-CCNC: 78 U/L (ref 40–150)
ALT SERPL W/O P-5'-P-CCNC: 13 U/L (ref 10–44)
ANION GAP SERPL CALC-SCNC: 9 MMOL/L (ref 8–16)
AST SERPL-CCNC: 10 U/L (ref 10–40)
BASOPHILS # BLD AUTO: 0.03 K/UL (ref 0–0.2)
BASOPHILS NFR BLD: 0.4 % (ref 0–1.9)
BILIRUB SERPL-MCNC: 0.3 MG/DL (ref 0.1–1)
BUN SERPL-MCNC: 34 MG/DL (ref 8–23)
CALCIUM SERPL-MCNC: 8.1 MG/DL (ref 8.7–10.5)
CHLORIDE SERPL-SCNC: 93 MMOL/L (ref 95–110)
CO2 SERPL-SCNC: 21 MMOL/L (ref 23–29)
CREAT SERPL-MCNC: 3 MG/DL (ref 0.5–1.4)
DIFFERENTIAL METHOD BLD: ABNORMAL
EOSINOPHIL # BLD AUTO: 0.4 K/UL (ref 0–0.5)
EOSINOPHIL NFR BLD: 4.9 % (ref 0–8)
ERYTHROCYTE [DISTWIDTH] IN BLOOD BY AUTOMATED COUNT: 13 % (ref 11.5–14.5)
EST. GFR  (NO RACE VARIABLE): 16 ML/MIN/1.73 M^2
GLUCOSE SERPL-MCNC: 172 MG/DL (ref 70–110)
HCT VFR BLD AUTO: 27.4 % (ref 37–48.5)
HGB BLD-MCNC: 8.8 G/DL (ref 12–16)
IMM GRANULOCYTES # BLD AUTO: 0.06 K/UL (ref 0–0.04)
IMM GRANULOCYTES NFR BLD AUTO: 0.8 % (ref 0–0.5)
INR PPP: 1.1 (ref 0.8–1.2)
LYMPHOCYTES # BLD AUTO: 1.6 K/UL (ref 1–4.8)
LYMPHOCYTES NFR BLD: 21.8 % (ref 18–48)
MAGNESIUM SERPL-MCNC: 1.6 MG/DL (ref 1.6–2.6)
MCH RBC QN AUTO: 29.5 PG (ref 27–31)
MCHC RBC AUTO-ENTMCNC: 32.1 G/DL (ref 32–36)
MCV RBC AUTO: 92 FL (ref 82–98)
MONOCYTES # BLD AUTO: 0.8 K/UL (ref 0.3–1)
MONOCYTES NFR BLD: 11 % (ref 4–15)
NEUTROPHILS # BLD AUTO: 4.5 K/UL (ref 1.8–7.7)
NEUTROPHILS NFR BLD: 61.1 % (ref 38–73)
NRBC BLD-RTO: 0 /100 WBC
PHOSPHATE SERPL-MCNC: 5 MG/DL (ref 2.7–4.5)
PLATELET # BLD AUTO: 240 K/UL (ref 150–450)
PMV BLD AUTO: 9.1 FL (ref 9.2–12.9)
POCT GLUCOSE: 139 MG/DL (ref 70–110)
POCT GLUCOSE: 184 MG/DL (ref 70–110)
POCT GLUCOSE: 298 MG/DL (ref 70–110)
POTASSIUM SERPL-SCNC: 4.6 MMOL/L (ref 3.5–5.1)
PROT SERPL-MCNC: 5.5 G/DL (ref 6–8.4)
PROTHROMBIN TIME: 11.8 SEC (ref 9–12.5)
RBC # BLD AUTO: 2.98 M/UL (ref 4–5.4)
SODIUM SERPL-SCNC: 123 MMOL/L (ref 136–145)
WBC # BLD AUTO: 7.29 K/UL (ref 3.9–12.7)

## 2025-01-09 PROCEDURE — 25000003 PHARM REV CODE 250: Mod: HCNC | Performed by: HOSPITALIST

## 2025-01-09 PROCEDURE — 36415 COLL VENOUS BLD VENIPUNCTURE: CPT | Mod: HCNC | Performed by: STUDENT IN AN ORGANIZED HEALTH CARE EDUCATION/TRAINING PROGRAM

## 2025-01-09 PROCEDURE — 25000003 PHARM REV CODE 250: Mod: HCNC | Performed by: NURSE PRACTITIONER

## 2025-01-09 PROCEDURE — 85610 PROTHROMBIN TIME: CPT | Mod: HCNC | Performed by: STUDENT IN AN ORGANIZED HEALTH CARE EDUCATION/TRAINING PROGRAM

## 2025-01-09 PROCEDURE — 63600175 PHARM REV CODE 636 W HCPCS: Mod: HCNC | Performed by: HOSPITALIST

## 2025-01-09 PROCEDURE — 80053 COMPREHEN METABOLIC PANEL: CPT | Mod: HCNC

## 2025-01-09 PROCEDURE — 0JH63XZ INSERTION OF TUNNELED VASCULAR ACCESS DEVICE INTO CHEST SUBCUTANEOUS TISSUE AND FASCIA, PERCUTANEOUS APPROACH: ICD-10-PCS | Performed by: HOSPITALIST

## 2025-01-09 PROCEDURE — 83735 ASSAY OF MAGNESIUM: CPT | Mod: HCNC

## 2025-01-09 PROCEDURE — 85025 COMPLETE CBC W/AUTO DIFF WBC: CPT | Mod: HCNC

## 2025-01-09 PROCEDURE — 90935 HEMODIALYSIS ONE EVALUATION: CPT | Mod: HCNC

## 2025-01-09 PROCEDURE — 27000207 HC ISOLATION: Mod: HCNC

## 2025-01-09 PROCEDURE — 21400001 HC TELEMETRY ROOM: Mod: HCNC

## 2025-01-09 PROCEDURE — 90935 HEMODIALYSIS ONE EVALUATION: CPT | Mod: HCNC,,, | Performed by: NURSE PRACTITIONER

## 2025-01-09 PROCEDURE — 02HV33Z INSERTION OF INFUSION DEVICE INTO SUPERIOR VENA CAVA, PERCUTANEOUS APPROACH: ICD-10-PCS | Performed by: HOSPITALIST

## 2025-01-09 PROCEDURE — 63600175 PHARM REV CODE 636 W HCPCS: Mod: HCNC | Performed by: RADIOLOGY

## 2025-01-09 PROCEDURE — 25000003 PHARM REV CODE 250: Mod: HCNC

## 2025-01-09 PROCEDURE — 25500020 PHARM REV CODE 255: Mod: HCNC | Performed by: HOSPITALIST

## 2025-01-09 PROCEDURE — 63600175 PHARM REV CODE 636 W HCPCS: Mod: HCNC

## 2025-01-09 PROCEDURE — 84100 ASSAY OF PHOSPHORUS: CPT | Mod: HCNC

## 2025-01-09 RX ORDER — HEPARIN SODIUM 1000 [USP'U]/ML
INJECTION, SOLUTION INTRAVENOUS; SUBCUTANEOUS
Status: COMPLETED | OUTPATIENT
Start: 2025-01-09 | End: 2025-01-09

## 2025-01-09 RX ORDER — MIDAZOLAM HYDROCHLORIDE 1 MG/ML
INJECTION, SOLUTION INTRAMUSCULAR; INTRAVENOUS
Status: COMPLETED | OUTPATIENT
Start: 2025-01-09 | End: 2025-01-09

## 2025-01-09 RX ORDER — FENTANYL CITRATE 50 UG/ML
INJECTION, SOLUTION INTRAMUSCULAR; INTRAVENOUS
Status: COMPLETED | OUTPATIENT
Start: 2025-01-09 | End: 2025-01-09

## 2025-01-09 RX ORDER — SODIUM CHLORIDE 9 MG/ML
INJECTION, SOLUTION INTRAVENOUS ONCE
Status: CANCELLED | OUTPATIENT
Start: 2025-01-09 | End: 2025-01-09

## 2025-01-09 RX ADMIN — HYDROCODONE BITARTRATE AND ACETAMINOPHEN 1 TABLET: 10; 325 TABLET ORAL at 08:01

## 2025-01-09 RX ADMIN — INSULIN ASPART 3 UNITS: 100 INJECTION, SOLUTION INTRAVENOUS; SUBCUTANEOUS at 08:01

## 2025-01-09 RX ADMIN — ANASTROZOLE 1 MG: 1 TABLET, COATED ORAL at 08:01

## 2025-01-09 RX ADMIN — CARVEDILOL 12.5 MG: 12.5 TABLET, FILM COATED ORAL at 08:01

## 2025-01-09 RX ADMIN — ATORVASTATIN CALCIUM 80 MG: 40 TABLET, FILM COATED ORAL at 08:01

## 2025-01-09 RX ADMIN — IPRATROPIUM BROMIDE 2 SPRAY: 42 SPRAY, METERED NASAL at 08:01

## 2025-01-09 RX ADMIN — IOHEXOL 10 ML: 300 INJECTION, SOLUTION INTRAVENOUS at 10:01

## 2025-01-09 RX ADMIN — HYDROCODONE BITARTRATE AND ACETAMINOPHEN 1 TABLET: 10; 325 TABLET ORAL at 12:01

## 2025-01-09 RX ADMIN — METHOCARBAMOL 750 MG: 750 TABLET ORAL at 08:01

## 2025-01-09 RX ADMIN — DULOXETINE HYDROCHLORIDE 40 MG: 20 CAPSULE, DELAYED RELEASE ORAL at 08:01

## 2025-01-09 RX ADMIN — HEPARIN SODIUM 1000 UNITS: 1000 INJECTION, SOLUTION INTRAVENOUS; SUBCUTANEOUS at 04:01

## 2025-01-09 RX ADMIN — LEVOTHYROXINE SODIUM 50 MCG: 0.05 TABLET ORAL at 06:01

## 2025-01-09 RX ADMIN — HYDROCODONE BITARTRATE AND ACETAMINOPHEN 1 TABLET: 10; 325 TABLET ORAL at 06:01

## 2025-01-09 RX ADMIN — VANCOMYCIN HYDROCHLORIDE 500 MG: 500 INJECTION, POWDER, LYOPHILIZED, FOR SOLUTION INTRAVENOUS at 05:01

## 2025-01-09 RX ADMIN — AMLODIPINE BESYLATE 10 MG: 10 TABLET ORAL at 08:01

## 2025-01-09 RX ADMIN — OXYBUTYNIN CHLORIDE 10 MG: 10 TABLET, EXTENDED RELEASE ORAL at 08:01

## 2025-01-09 RX ADMIN — FENTANYL CITRATE 100 MCG: 50 INJECTION, SOLUTION INTRAMUSCULAR; INTRAVENOUS at 09:01

## 2025-01-09 RX ADMIN — SEVELAMER CARBONATE 800 MG: 800 TABLET, FILM COATED ORAL at 05:01

## 2025-01-09 RX ADMIN — SODIUM BICARBONATE 650 MG TABLET 650 MG: at 08:01

## 2025-01-09 RX ADMIN — HEPARIN SODIUM 3200 UNITS: 1000 INJECTION, SOLUTION INTRAVENOUS; SUBCUTANEOUS at 10:01

## 2025-01-09 RX ADMIN — TRAZODONE HYDROCHLORIDE 100 MG: 100 TABLET ORAL at 08:01

## 2025-01-09 RX ADMIN — MIDAZOLAM 2 MG: 1 INJECTION INTRAMUSCULAR; INTRAVENOUS at 09:01

## 2025-01-09 RX ADMIN — LOSARTAN POTASSIUM 100 MG: 50 TABLET, FILM COATED ORAL at 08:01

## 2025-01-09 NOTE — PROGRESS NOTES
Pharmacokinetic Assessment Follow Up: IV Vancomycin    Vancomycin serum concentration assessment(s)/Regimen Plan:    HD scheduled for 1/9.  No Vancomycin given since last random 1/8  Can use random 18.7 to redose  Based on vancomycin random, will redose Vancomycin 500 mg IV after HD  Next VR ordered 1/10 w/AM lab  Re-dose when the random level is less than 20    Drug levels (last 3 results):  Recent Labs   Lab Result Units 01/08/25  0232   Vancomycin, Random ug/mL 18.7       Pharmacy will continue to follow and monitor vancomycin.    Please contact pharmacy at extension [phone] for questions regarding this assessment.    Thank you for the consult,   Melida Roche       Patient brief summary:  Cecile Bowen is a 74 y.o. female initiated on antimicrobial therapy with IV Vancomycin for treatment of bacteremia    The patient's current regimen is pulse dose    Drug Allergies:   Review of patient's allergies indicates:  No Known Allergies    Actual Body Weight:   97.1 kg    Renal Function:   Estimated Creatinine Clearance: 20.7 mL/min (A) (based on SCr of 3 mg/dL (H)).,     Dialysis Method (if applicable):  intermittent HD    CBC (last 72 hours):  Recent Labs   Lab Result Units 01/07/25  0336 01/08/25 0232 01/09/25  0343   WBC K/uL 7.15 7.94 7.29   Hemoglobin g/dL 9.2* 9.5* 8.8*   Hematocrit % 28.9* 29.9* 27.4*   Platelets K/uL 227 276 240   Gran % % 59.0 55.8 61.1   Lymph % % 21.8 24.2 21.8   Mono % % 13.4 13.9 11.0   Eosinophil % % 3.9 4.5 4.9   Basophil % % 0.6 0.5 0.4   Differential Method  Automated Automated Automated       Metabolic Panel (last 72 hours):  Recent Labs   Lab Result Units 01/07/25  0336 01/08/25  0232 01/09/25  0343   Sodium mmol/L 127* 124* 123*   Potassium mmol/L 3.9 4.1 4.6   Chloride mmol/L 97 93* 93*   CO2 mmol/L 22* 19* 21*   Glucose mg/dL 124* 151* 172*   BUN mg/dL 19 29* 34*   Creatinine mg/dL 2.0* 2.6* 3.0*   Albumin g/dL 2.4* 2.7* 2.3*   Total Bilirubin mg/dL 0.2 0.3 0.3   Alkaline  Phosphatase U/L 78 85 78   AST U/L 14 21 10   ALT U/L 15 14 13   Magnesium mg/dL 1.6 1.6 1.6   Phosphorus mg/dL 4.2 4.5 5.0*       Vancomycin Administrations:  vancomycin given in the last 96 hours                     vancomycin (VANCOCIN) 500 mg in D5W 100 mL IVPB (MB+) (mg) 500 mg New Bag 01/06/25 1859                    Microbiologic Results:  Microbiology Results (last 7 days)       Procedure Component Value Units Date/Time    Blood culture [[id number]] Collected: 01/02/25 0529    Order Status: Completed Specimen: Blood from Peripheral, Hand, Right Updated: 01/07/25 0812     Blood Culture, Routine No growth after 5 days.    Blood culture [[id number]] Collected: 01/02/25 0526    Order Status: Completed Specimen: Blood from Peripheral, Antecubital, Right Updated: 01/07/25 0812     Blood Culture, Routine No growth after 5 days.    Urine culture [[id number]]  (Abnormal)  (Susceptibility) Collected: 12/31/24 1049    Order Status: Completed Specimen: Urine Updated: 01/05/25 1445     Urine Culture, Routine KLEBSIELLA PNEUMONIAE ESBL  50,000 - 99,999 cfu/ml        PROTEUS MIRABILIS  10,000 - 49,999 cfu/ml      Narrative:      Specimen Source->Urine    Blood culture x two cultures. Draw prior to antibiotics. [[id number]]  (Abnormal) Collected: 12/31/24 0937    Order Status: Completed Specimen: Blood from Peripheral, Antecubital, Right Updated: 01/03/25 1431     Blood Culture, Routine Gram stain aer bottle: Gram positive cocci in chains resembling Strep      Gram stain zelda bottle: Gram positive cocci in chains resembling Strep      Results called to and read back by: Sean Hernandez RN  01/01/2025  10:31      ENTEROCOCCUS FAECALIS  For susceptibility see order #[id number]      Narrative:      Aerobic and anaerobic    Blood culture x two cultures. Draw prior to antibiotics. [[id number]]  (Abnormal)  (Susceptibility) Collected: 12/31/24 0937    Order Status: Completed Specimen: Blood from Peripheral, Antecubital, Right  Updated: 01/03/25 1429     Blood Culture, Routine Gram stain aer bottle: Gram positive cocci in chains resembling Strep      Gram stain zelda bottle: Gram positive cocci in chains resembling Strep      Results called to and read back by: Sean Hernandez RN  01/01/2025  10:31      ENTEROCOCCUS FAECALIS    Narrative:      Aerobic and anaerobic

## 2025-01-09 NOTE — PROGRESS NOTES
Petros Shaffer - Internal Medicine [facility] Medicine  Progress Note    Patient Name: Cecile Bowen  MRN: [id number]  Patient Class: IP- Inpatient   Admission Date: 12/31/2024  Length of Stay: 9 days  Attending Physician: Milton Julian MD  Primary Care Provider: Lurdes Navarro MD        Subjective     Principal Problem:Severe sepsis        HPI:  Ms. Cecile Bowen is a 74 y.o. female with ESRD on HD, T2DM, afib on Eliquis, HTN, obesity, history of breast cancer, chronic suprapubic catheter for neurogenic bladder and chronic back pain who [place] [facility] [place] Vascular Surgery for evaluation of fever and AMS.  History is obtained from patient.  She reports that over the last few days, she has been having fever and lethargy.  She went to see her Vascular Surgeon for right IJ PermCath removal, as her fistula has matured.  She is currently dialyzing only Mondays and Fridays via right IJ PermCath.  However, upon arrival to clinic she was febrile to 100.9F, slow to respond and tachy to 104.  She was sent to the ER for further evaluation.  At baseline, she uses a wheelchair.  She lives with her sister.  She currently endorses back pain that is not escalated from normal.  She is full code.  She took all of her medications prior to going to clinic except her Eliquis.    Upon arrival to the ER, vitals were temp 101.7F, , /77.  Labs showed WBC 20, Hgb 11.3, Sodium 129, BUN/Cr 28/3.2, Lactic 0.8.  UA was dirty.  Suprapubic catheter was exchanged in the ED.  CXR was negative.  She was given Vanc and Zosyn was admitted to Hospital Medicine for further management.    Overview/Hospital Course:  Ms. Bowen is a 74 y.o. female with ESRD on HD, T2DM, afib on Eliquis, HTN, obesity, history of breast cancer, chronic suprapubic catheter for neurogenic bladder and chronic back pain who was admitted for severe sepsis 2/2 Enterococcus bacteremia and suprapubic UTI.  She was started on Zosyn.   After her blood cx returned positive, ID was consulted and she was started on Dapto for Enterococcus. Switched to Vanc . Plan for 8 weeks of Vanc with HD from discharge Urine cx returned with ESBL Klebsiella and Proteus, unclear if this is a UTI or just colonization. S/p one dose of gentamicin per ID Had issues with AVF for HD during stay. Vascular surgery was consulted s/p fistulogram 1/6 with good flow. RN unable to cannulate AVF with arterial and venous needle despite fistulogram  s/p line removal by IR today for 24-48 hr line holiday; Plan for TDC placement by IR in am NPO after MN will need OP follow up with Vascular    1/9 Severe sepsis 2/2 Enterococcus bacteremia and suprapubic UTI.   ID was consulted and she was started on Dapto for Enterococcus. Switched to Vancomycin .  Had issues with AVF for HD during stay. Vascular surgery was consulted s/p fistulogram 1/6 with good flow. RN unable to cannulate AVF with arterial and venous needle despite fistulogram.   s/p line removal by IR 01/07 for 24-48 hr line holiday; Plan for 8 weeks of Vanc with HD from discharge per ID recs.   Vancomycin 500 mg IV after HD. Re-dose when the random level is less than 20. s/p DC placement by IR today. HD today after TDC placement. sodium trended down to 123. Fluid restriction 1L/24h . UF with goal 2L today. needs HD today after TDC placement. hold eliquis today for TDC.  needs OP follow up with Vascular surgery           Review of Systems:   Pain scale:   Constitutional:  fever,  chills, headache, vision loss, hearing loss, weight loss, Generalized weakness, falls, loss of smell, loss of taste, poor appetite,  sore throat  Respiratory: cough, shortness of breath.   Cardiovascular: chest pain, dizziness, palpitations, orthopnea, swelling of feet, syncope  Gastrointestinal: nausea, vomiting, abdominal pain, diarrhea, black stool,  blood in stool, change in bowel habits, constipation  Genitourinary: hematuria, dysuria, urgency,  frequency  Integument/Breast: rash,  pruritis  Hematologic/Lymphatic: easy bruising, lymphadenopathy  Musculoskeletal: arthralgias , myalgias, back pain, neck pain, knee pain  Neurological: confusion, seizures, tremors, slurred speech  Behavioral/Psych:  depression, anxiety, auditory or visual hallucinations     OBJECTIVE:     Physical Exam:  Body mass index is 32.54 kg/m².    Constitutional: Appears well-developed and well-nourished. obesity  Head: Normocephalic and atraumatic.   Neck: Normal range of motion. Neck supple.   Cardiovascular: Normal heart rate.  Regular heart rhythm.  Pulmonary/Chest: Effort normal.   Abdominal: No distension.  No tenderness.suprapubic catheter   Musculoskeletal: Normal range of motion. No edema.   Neurological: Alert and oriented to person, place, and time.   Skin: Skin is warm and dry.   Psychiatric: Normal mood and affect. Behavior is normal.                  Vital Signs  Temp: 98.2 °F (36.8 °C) (01/09/25 1015)  Pulse: 73 (01/09/25 1655)  Resp: 16 (01/09/25 1211)  BP: (!) 181/74 (01/09/25 1655)  SpO2: 100 % (01/09/25 1655)     24 Hour VS Range    Temp:  [98.1 °F (36.7 °C)-98.3 °F (36.8 °C)]   Pulse:  [65-82]   Resp:  [16-18]   BP: (107-181)/()   SpO2:  [95 %-100 %]     Intake/Output Summary (Last 24 hours) at 1/9/2025 1832  Last data filed at 1/9/2025 1819  Gross per 24 hour   Intake 280 ml   Output 3850 ml   Net -3570 ml         I/O This Shift:  I/O this shift:  In: 280 [P.O.:280]  Out: 3050 [Urine:400; Other:2650]    Wt Readings from Last 3 Encounters:   01/01/25 97.1 kg (214 lb)   09/19/24 103.7 kg (228 lb 9.9 oz)   06/13/24 103.7 kg (228 lb 9.9 oz)       I have personally reviewed the vitals and recorded Intake/Output     Laboratory/Diagnostic Data:    CBC/Anemia Labs: Coags:    Recent Labs   Lab 01/07/25  0336 01/08/25  0232 01/09/25  0343   WBC 7.15 7.94 7.29   HGB 9.2* 9.5* 8.8*   HCT 28.9* 29.9* 27.4*    276 240   MCV 93 93 92   RDW 12.9 12.8 13.0    Recent  "Labs   Lab 01/09/25  0343   INR 1.1        Chemistries: ABG:   Recent Labs   Lab 01/07/25  0336 01/08/25  0232 01/09/25  0343   * 124* 123*   K 3.9 4.1 4.6   CL 97 93* 93*   CO2 22* 19* 21*   BUN 19 29* 34*   CREATININE 2.0* 2.6* 3.0*   CALCIUM 8.3* 8.2* 8.1*   PROT 5.9* 6.4 5.5*   BILITOT 0.2 0.3 0.3   ALKPHOS 78 85 78   ALT 15 14 13   AST 14 21 10   MG 1.6 1.6 1.6   PHOS 4.2 4.5 5.0*    No results for input(s): "PH", "PCO2", "PO2", "HCO3", "POCSATURATED", "BE" in the last 168 hours.     POCT Glucose: HbA1c:    Recent Labs   Lab 01/08/25  0946 01/08/25  1145 01/08/25  1526 01/08/25  1914 01/09/25  0740 01/09/25  1620   POCTGLUCOSE 185* 217* 233* 239* 184* 139*    Hemoglobin A1C   Date Value Ref Range Status   12/31/2024 5.9 (H) 4.0 - 5.6 % Final     Comment:     ADA Screening Guidelines:  5.7-6.4%  Consistent with prediabetes  >or=6.5%  Consistent with diabetes    High levels of fetal hemoglobin interfere with the HbA1C  assay. Heterozygous hemoglobin variants (HbS, HgC, etc)do  not significantly interfere with this assay.   However, presence of multiple variants may affect accuracy.     04/22/2024 6.2 (H) 4.8 - 5.9 % Final   10/06/2023 5.0 4.8 - 5.9 % Final        Cardiac Enzymes: Ejection Fractions:    No results for input(s): "CPK", "CPKMB", "MB", "TROPONINI" in the last 72 hours. EF   Date Value Ref Range Status   08/07/2023 60 % Final   11/29/2021 60 % Final          No results for input(s): "COLORU", "APPEARANCEUA", "PHUR", "SPECGRAV", "PROTEINUA", "GLUCUA", "KETONESU", "BILIRUBINUA", "OCCULTUA", "NITRITE", "UROBILINOGEN", "LEUKOCYTESUR", "RBCUA", "WBCUA", "BACTERIA", "SQUAMEPITHEL", "HYALINECASTS" in the last 48 hours.    Invalid input(s): "WRIGHTSUR"    Procalcitonin (ng/mL)   Date Value   12/31/2024 1.41 (H)   02/11/2022 0.12   12/06/2021 0.69 (H)     Lactate (Lactic Acid) (mmol/L)   Date Value   12/31/2024 0.8   08/17/2023 0.6   08/06/2023 0.8   02/11/2022 1.4   02/11/2022 1.8     BNP (pg/mL)   Date " "Value   08/17/2023 495 (H)   08/06/2023 390 (H)   01/21/2022 167 (H)   01/20/2022 190 (H)   11/28/2021 773 (H)     CRP (mg/L)   Date Value   06/06/2024 13.5 (H)   06/30/2010 6.4   06/11/2010 4.9     Sed Rate   Date Value   06/06/2024 44 mm/Hr (H)   03/12/2015 110 mm/Hr (H)   06/11/2010 48 mm/hr (H)   02/09/2004 35 mm/hr (H)     No results found for: "DDIMER"  Ferritin (ng/mL)   Date Value   12/04/2024 626 (H)   11/06/2024 333 (H)   10/04/2024 594 (H)   09/04/2024 546 (H)   08/12/2024 510 (H)   07/01/2024 525 (H)   06/07/2024 554 (H)   05/01/2024 697 (H)   04/05/2024 483 (H)   03/08/2024 357 (H)     LD (U/L)   Date Value   03/12/2015 150     Troponin I (ng/mL)   Date Value   08/07/2023 0.066 (H)   08/06/2023 0.066 (H)   08/06/2023 0.063 (H)   01/21/2022 0.018   01/20/2022 0.014   11/29/2021 0.155 (H)   11/28/2021 0.181 (H)   11/28/2021 0.201 (H)     CPK (U/L)   Date Value   01/02/2025 62   11/14/2023 56   11/29/2021 789 (H)   09/28/2021 32   09/05/2021 43   02/09/2004 82     Results for orders placed or performed in visit on 01/05/24   Vitamin D    Collection Time: 01/05/24 12:00 AM   Result Value Ref Range    Vit D, 25-Hydroxy 44.9 30.0 - 100.0 ng/mL   Results for orders placed or performed during the hospital encounter of 08/06/23   Vitamin D    Collection Time: 08/09/23 12:25 PM   Result Value Ref Range    Vit D, 25-Hydroxy 15 (L) 30 - 96 ng/mL     *Note: Due to a large number of results and/or encounters for the requested time period, some results have not been displayed. A complete set of results can be found in Results Review.     SARS-CoV2 (COVID-19) Qualitative PCR (no units)   Date Value   12/31/2024 Not Detected   02/15/2022 Not Detected   12/28/2021 Not Detected   12/21/2021 Not Detected   09/05/2021 Not Detected     POC Rapid COVID (no units)   Date Value   02/11/2022 Negative   01/21/2022 Negative   01/20/2022 Negative   11/28/2021 Negative   09/28/2021 Negative   09/05/2021 Negative   05/14/2021 " Negative       Microbiology labs for the last week  Microbiology Results (last 7 days)       Procedure Component Value Units Date/Time    Blood culture [[id number]] Collected: 01/02/25 0529    Order Status: Completed Specimen: Blood from Peripheral, Hand, Right Updated: 01/07/25 0812     Blood Culture, Routine No growth after 5 days.    Blood culture [[id number]] Collected: 01/02/25 0526    Order Status: Completed Specimen: Blood from Peripheral, Antecubital, Right Updated: 01/07/25 0812     Blood Culture, Routine No growth after 5 days.    Urine culture [[id number]]  (Abnormal)  (Susceptibility) Collected: 12/31/24 1049    Order Status: Completed Specimen: Urine Updated: 01/05/25 1445     Urine Culture, Routine KLEBSIELLA PNEUMONIAE ESBL  50,000 - 99,999 cfu/ml        PROTEUS MIRABILIS  10,000 - 49,999 cfu/ml      Narrative:      Specimen Source->Urine    Blood culture x two cultures. Draw prior to antibiotics. [[id number]]  (Abnormal) Collected: 12/31/24 0937    Order Status: Completed Specimen: Blood from Peripheral, Antecubital, Right Updated: 01/03/25 1431     Blood Culture, Routine Gram stain aer bottle: Gram positive cocci in chains resembling Strep      Gram stain zelda bottle: Gram positive cocci in chains resembling Strep      Results called to and read back by: Sean Hernandez RN  01/01/2025  10:31      ENTEROCOCCUS FAECALIS  For susceptibility see order #[id number]      Narrative:      Aerobic and anaerobic    Blood culture x two cultures. Draw prior to antibiotics. [[id number]]  (Abnormal)  (Susceptibility) Collected: 12/31/24 0937    Order Status: Completed Specimen: Blood from Peripheral, Antecubital, Right Updated: 01/03/25 1429     Blood Culture, Routine Gram stain aer bottle: Gram positive cocci in chains resembling Strep      Gram stain zelda bottle: Gram positive cocci in chains resembling Strep      Results called to and read back by: Sean Hernandez RN  01/01/2025  10:31      ENTEROCOCCUS FAECALIS     Narrative:      Aerobic and anaerobic            Reviewed and noted in plan where applicable- Please see chart for full lab data.    Lines/Drains:       Peripheral IV - Single Lumen 01/03/25 1708 20 G 1 3/4 in No Anterior;Right Upper Arm (Active)   Site Assessment Clean;Dry;Intact 01/08/25 2123   Line Securement Device Secured with sutureless device 01/08/25 2123   Extremity Assessment Distal to IV No abnormal discoloration;No redness;No swelling;No warmth 01/08/25 2123   Line Status Saline locked 01/08/25 2123   Dressing Status Clean;Intact;Dry 01/08/25 2123   Dressing Intervention Integrity maintained 01/08/25 2123   Dressing Change Due 01/07/25 01/08/25 2123   Site Change Due 01/07/25 01/08/25 2123   Reason Not Rotated Poor venous access 01/08/25 2123   Number of days: 5            Hemodialysis AV Fistula 02/14/24 1034 Left forearm (Active)   Needle Size 15ga 01/06/25 1447   Site Assessment Clean;Dry;Intact;No redness;No swelling 01/07/25 0912   Patency Present;Thrill 01/07/25 2030   Status Deaccessed 01/07/25 0912   Flows Good 01/06/25 1803   Dressing Intervention Integrity maintained 01/07/25 0912   Dressing Status Clean;Dry;Intact 01/07/25 0912   Site Condition No complications 01/06/25 1803   Number of days: 329            Suprapubic Catheter 12/31/24 1050 16 Fr. (Active)   Clamp Status unclamped 01/08/25 2123   Dressing no dressing 01/08/25 2123   Drainage yellow drainage 01/06/25 1022   Urine Color Yellow 01/08/25 2123   Collection Container Standard drainage bag 01/08/25 2123   Securement secured to abdomen w/ adhesive device 01/08/25 2123   Output (mL) 550 mL 01/08/25 2056   Number of days: 8       Imaging  ECG Results              EKG 12-lead (Final result)        Collection Time Result Time QRS Duration OHS QTC Calculation    12/31/24 09:33:53 12/31/24 14:17:21 78 432                     Final result by Interface, Lab In [facility] (12/31/24 14:17:28)                   Narrative:    Test Reason :  Z13.6,    Vent. Rate : 103 BPM     Atrial Rate : 103 BPM     P-R Int : 188 ms          QRS Dur :  78 ms      QT Int : 330 ms       P-R-T Axes :   8 -18  13 degrees    QTcB Int : 432 ms    Sinus tachycardia  Voltage criteria for left ventricular hypertrophy  Possible Lateral infarct ,age undetermined  Inferior infarct ,age undetermined  Abnormal ECG  When compared with ECG of 12-Jun-2024 15:18,  Nonspecific ST abnormality present in inferior leads    Confirmed by Cesar Sotelo ([id number]) on 12/31/2024 2:17:16 PM    Referred By: AAAREFERRAL SELF           Confirmed By: Cesar Sotelo                                    Results for orders placed during the hospital encounter of 12/31/24    Echo    Interpretation Summary    Left Ventricle: The left ventricle is normal in size. Normal wall thickness. Normal wall motion. There is normal systolic function with a visually estimated ejection fraction of 60 - 65%. Grade II diastolic dysfunction.    Right Ventricle: Normal right ventricular cavity size. Wall thickness is normal. Systolic function is normal.    Left Atrium: Left atrium is moderately dilated.    Aortic Valve: The aortic valve is a trileaflet valve. There is moderate aortic valve sclerosis. Mildly restricted motion. There is mild stenosis. Aortic valve area by VTI is 1.8 cm². Aortic valve peak velocity is 2.0 m/s. Mean gradient is 9.6 mmHg. The dimensionless index is 0.58.    Pulmonary Artery: The estimated pulmonary artery systolic pressure is 26 mmHg.    IVC/SVC: IVC was not well visualized due to poor acoustic window.      IR Tunneled Catheter Insert w/o Port  Narrative: EXAMINATION:  Tunneled central venous catheter placement    Procedural Personnel    Attending physician(s): Froy Garza    Fellow physician(s): None    Resident physician(s): None    Advanced practice provider(s): None    Pre-procedure diagnosis: Renal failure    Post-procedure diagnosis: Same    Indication: Performance of hemodialysis    Additional  clinical history: None    Complications: No immediate complications.    TECHNIQUE:  - Venous access with ultrasound guidance    - Tunneled central venous catheter insertion with fluoroscopic guidance    FINDINGS:  Pre-procedure    History and imaging of central venous access reviewed (QCDR): Yes    Consent: Informed consent for the procedure was obtained and time-out was performed prior to the procedure.    Prophylactic antibiotic administered: None    Preparation (MIPS): The site was prepared and draped using all elements of maximal sterile barrier technique including sterile gloves, sterile gown, cap, mask, large sterile sheet, sterile ultrasound probe cover, hand hygiene and cutaneous antisepsis with 2% chlorhexidine.    Medical reason for site preparation exception (MIPS): Not applicable    Anesthesia/sedation    The IR procedural team has confirmed the patient ID and re-evaluated the patient and sedation plan confirming it is suitable for the patient's condition and procedure.    Level of anesthesia/sedation: Moderate sedation (conscious sedation)    Anesthesia/sedation administered by: Nurse or other independent trained observer    Total intra-service sedation time (minutes): 15    Access    Local anesthesia was administered. The vessel was sonographically evaluated and determined to be patent. Real time ultrasound was used to visualize needle entry into the vessel and a permanent image was stored.    Vein accessed: Internal jugular vein    Access technique: Micropuncture set with 21 gauge needle    Venography    Indication for venography: Confirmation of position or mapping of vascular anatomy    Catheter tip position for venography: SVC    Venous segment imaged: SVC    Venogram demonstrates appropriate antegrade flow into the right atrium    Catheter placement    An incision was made near the venous access site and the catheter was tunneled subcutaneously to the venous access site . The catheter was advanced  via a peel-away sheath into the vein under fluoroscopic guidance. Catheter tip location was fluoroscopically verified and a permanent image was stored.    Catheter placed: Bard Glidepath    Catheter size ([place] citizen): 14.5    Catheter cuff-to-tip or trimmed length (cm): 19    Catheter tip position: 2.5 VBUs below kaity.    Unique Device Identifier (TIFF): Not available    Catheter flush: Heparin (1000 units/mL)    Closure    A sterile dressing was applied.    Access site closure technique: Tissue adhesive    Catheter securement technique: Non-absorbable suture    Contrast    Contrast agent: None    Contrast volume (mL): 0    Radiation Dose    Fluoroscopy time ( minutes): 1.6    Reference air kerma ( mGy): 15.3    Kerma area product ( uGy-m2): 475.02    Additional Details    Additional description of procedure: None    Equipment details: None    Specimens removed: None    Estimated blood loss (mL): Less than 10    Standardized report: [email or web address]    Attestation    Signer name: Froy Garza    I attest that I was present for the entire procedure. I reviewed the stored images and agree with the report as written.  Impression: Insertion of right-sided supradiaphragmatic dual-lumen tunneled dialysis catheter, with tip in the expected location of the cavoatrial junction.    Plan:    The catheter may be used immediately.    _______________________________________________________________    Electronically signed by: Froy Garza  Date:    01/09/2025  Time:    17:36      Labs, Imaging, EKG and Diagnostic results from 1/9/2025 were reviewed.    Medications:  Medication list was reviewed and changes noted under Assessment/Plan.  Current Facility-Administered Medications on File Prior to Encounter   Medication Dose Route Frequency Provider Last Rate Last Admin    epoetin chloe injection 2,000 Units  2,000 Units Intravenous 1 time in Clinic/HOD Emmanuel Hare Jr., MD        heparin (porcine) injection 2,000 Units   "2,000 Units Intravenous Once Emmanuel Hare Jr., MD        heparin (porcine) injection 2,000 Units  2,000 Units Intravenous Once Emmanuel Hare Jr., MD        heparin (porcine) injection 4,000 Units  4,000 Units Intra-Catheter 1 time in Clinic/HOD Emmanuel Hare Jr., MD        iron sucrose injection 100 mg  100 mg Intravenous 1 time in Clinic/HOD Emmanuel Hare Jr., MD         Current Outpatient Medications on File Prior to Encounter   Medication Sig Dispense Refill    ELIQUIS 5 mg Tab TAKE 1 TABLET BY MOUTH TWICE A  tablet 3    acetaminophen (TYLENOL) 325 MG tablet Take 2 tablets (650 mg total) by mouth every 6 (six) hours as needed for Pain.      amLODIPine (NORVASC) 10 MG tablet Take 10 mg by mouth.      anastrozole (ARIMIDEX) 1 mg Tab TAKE 1 TABLET BY MOUTH EVERY DAY 90 tablet 2    atorvastatin (LIPITOR) 80 MG tablet Take 1 tablet (80 mg total) by mouth once daily. 90 tablet 3    BD INSULIN SYRINGE ULTRA-FINE 0.5 mL 31 gauge x 5/16" Syrg USE WITH INSULIN 4 TIMES A  each 12    calcium acetate,phosphat bind, (PHOSLO) 667 mg capsule Take 667 mg by mouth 3 (three) times daily with meals.      carvediloL (COREG) 12.5 MG tablet Take 1 tablet (12.5 mg total) by mouth 2 (two) times daily. 180 tablet 3    diclofenac sodium (VOLTAREN) 1 % Gel Apply 2 g topically once daily.      DULoxetine (CYMBALTA) 20 MG capsule Take 2 capsules (40 mg total) by mouth once daily. 180 capsule 1    erenumab-aooe (AIMOVIG AUTOINJECTOR) 140 mg/mL autoinjector 140 mg MONTHLY (route: subcutaneous) 1 each 11    ergocalciferol (ERGOCALCIFEROL) 50,000 unit Cap Take 1 capsule (50,000 Units total) by mouth every 7 days. 12 capsule 3    furosemide (LASIX) 40 MG tablet Take 1 tablet (40 mg total) by mouth once daily. 30 tablet 11    HYDROcodone-acetaminophen (NORCO)  mg per tablet Take 1 tablet by mouth every 6 (six) hours as needed for Pain. 120 tablet 0    ipratropium (ATROVENT) 21 mcg (0.03 %) nasal " "spray 2 sprays by Each Nostril route 2 (two) times daily. 30 mL 0    lactulose (CHRONULAC) 20 gram/30 mL Soln Take 30 mLs (20 g total) by mouth every 12 (twelve) hours as needed (for constipation). 900 mL 0    LANTUS SOLOSTAR U-100 INSULIN 100 unit/mL (3 mL) InPn pen INJECT 12-15 UNITS UNDER THE SKIN at night 15 mL 3    losartan (COZAAR) 100 MG tablet Take 1 tablet (100 mg total) by mouth once daily. 90 tablet 3    methocarbamoL (ROBAXIN) 750 MG Tab TAKE 1 TO 2 TABLETS(750 TO 1500 MG) BY MOUTH THREE TIMES DAILY AS NEEDED FOR MUSCLE SPASMS 180 tablet 1    ondansetron (ZOFRAN-ODT) 8 MG TbDL 8 mg EVERY 12 HOURS (route: oral)      oxybutynin (DITROPAN-XL) 10 MG 24 hr tablet TAKE 1 TABLET BY MOUTH EVERY DAY 90 tablet 4    oxyCODONE (ROXICODONE) 5 MG immediate release tablet Take 1 tablet (5 mg total) by mouth daily as needed (severe pain). 30 tablet 0    patiromer calcium sorbitex (VELTASSA) 16.8 gram PwPk Take 1 packet (16.8 g total) by mouth once daily. for 90 doses 30 packet 2    pen needle, diabetic (BD ULTRA-FINE SHORT PEN NEEDLE) 31 gauge x 5/16" Ndle USE WITH LANTUS DAILY, e 11.65 100 each 3    silver sulfADIAZINE 1% (SILVADENE) 1 % cream Apply topically Every 3 (three) days. 20 g 0    sodium bicarbonate 650 MG tablet Take 1 tablet (650 mg total) by mouth once daily. 30 tablet 11    sumatriptan (IMITREX) 100 MG tablet Take 1 tablet (100 mg total) by mouth every 2 (two) hours as needed for Migraine (Limit 2 in 14 hours and 9 in one month). 30 tablet 1    SYNTHROID 50 mcg tablet TAKE 1 TABLET BY MOUTH BEFORE BREAKFAST. ADMINISTER ON AN EMPTY STOMACH AT LEAST 30 MINUTES BEFORE FOOD. IF RECEIVING TUBE FEEDS, HOLD TUBE FEEDS FOR 1 HOUR BEFORE AND AFTER LEVOTHYROXINE ADMINISTRATION. 90 tablet 2    traZODone (DESYREL) 100 MG tablet TAKE 1 TABLET BY MOUTH NIGHTLY AS NEEDED FOR INSOMNIA. 90 tablet 2    UNABLE TO FIND medication name: Tylenol Migraine- APAP, ASA, caffeine      VELPHORO 500 mg Chew CHEW INSERT TABLET 5 TIMES " DAILY WITH MEAL AND SNACK       Scheduled Medications:  Current Facility-Administered Medications   Medication Dose Route Frequency    amLODIPine  10 mg Oral Daily    anastrozole  1 mg Oral Daily    [START ON 1/10/2025] apixaban  5 mg Oral BID    atorvastatin  80 mg Oral Daily    carvediloL  12.5 mg Oral BID    DULoxetine  40 mg Oral Daily    epoetin chloe-ebpx (RETACRIT) injection  50 Units/kg Intravenous Every Mon, Wed, Fri    ipratropium  2 spray Each Nostril BID    levothyroxine  50 mcg Oral Before breakfast    losartan  100 mg Oral Daily    oxybutynin  10 mg Oral Daily    sevelamer carbonate  800 mg Oral TID WM    sodium bicarbonate  650 mg Oral Daily     PRN:   Current Facility-Administered Medications:     acetaminophen, 650 mg, Oral, Q4H PRN    dextrose 50%, 12.5 g, Intravenous, PRN    dextrose 50%, 12.5 g, Intravenous, PRN    dextrose 50%, 25 g, Intravenous, PRN    dextrose 50%, 25 g, Intravenous, PRN    glucagon (human recombinant), 1 mg, Intramuscular, PRN    glucagon (human recombinant), 1 mg, Intramuscular, PRN    glucose, 16 g, Oral, PRN    glucose, 16 g, Oral, PRN    glucose, 24 g, Oral, PRN    glucose, 24 g, Oral, PRN    heparin (porcine), 1,000 Units, Intra-Catheter, PRN    HYDROcodone-acetaminophen, 1 tablet, Oral, Q4H PRN    insulin aspart U-100, 1-10 Units, Subcutaneous, QID (AC + HS) PRN    methocarbamoL, 750 mg, Oral, TID PRN    naloxone, 0.02 mg, Intravenous, PRN    ondansetron, 8 mg, Intravenous, Q6H PRN    polyethylene glycol, 17 g, Oral, Daily PRN    sodium chloride 0.9%, 5 mL, Intravenous, PRN    traZODone, 100 mg, Oral, Nightly PRN    Pharmacy to dose Vancomycin consult, , , Once **AND** vancomycin - pharmacy to dose, , Intravenous, pharmacy to manage frequency  Infusions:   Estimated Creatinine Clearance: 20.7 mL/min (A) (based on SCr of 3 mg/dL (H)).           Assessment and Plan     * Severe sepsis  Pt admitted for severe sepsis 2/2 CAUTI and Enterococcus bacteremia. On arrival febrile  to 101.7F, tachycardic 102 with WBC 21 and slow to respond (encephalopathy). Suprapubic catheter changed in the ED. WBC down to 12, Procal 1.4. Urine cx with ESBL Klebsiella and Proteus. Blood cx 12.31 with Enterococcus faecalis. Blood cx 1.2 NGTD.     - ID consulted, appreciate recommendations:  -- 1 time dose of gentamicin  -- Continue vanc for Enterococcus faecalis bacteremia  -- Of note, does have a tunneled line which is likely the culprit of infection however there are issues with her AVF     s/p line removal by IR today for 24-48 hr line holiday;  Plan for TDC placement by IR in am NPO after MN    1/9 Severe sepsis 2/2 Enterococcus bacteremia and suprapubic UTI.   ID was consulted and she was started on Dapto for Enterococcus. Switched to Vancomycin .  Had issues with AVF for HD during stay. Vascular surgery was consulted s/p fistulogram 1/6 with good flow. RN unable to cannulate AVF with arterial and venous needle despite fistulogram.   s/p line removal by IR 01/07 for 24-48 hr line holiday; Plan for 8 weeks of Vanc with HD from discharge per ID recs    HTN (hypertension)  Patient's blood pressure range in the last 24 hours was: BP  Min: 122/67  Max: 188/71.The patient's inpatient anti-hypertensive regimen is listed below:  Current Antihypertensives  losartan tablet 100 mg, Daily, Oral  carvediloL tablet 12.5 mg, 2 times daily, Oral  amLODIPine tablet 10 mg, Daily, Oral    Plan  - BP increasing since holding BP meds for sepsis  - continue Coreg and Losartan.  - restarting home norvasc    Positive JASON (antinuclear antibody)  History noted      Personal history of malignant neoplasm of breast  History noted  Continue Arimidex      ESRD (end stage renal disease) on dialysis  Creatine stable for now. BMP reviewed- noted Estimated Creatinine Clearance: 20.7 mL/min (A) (based on SCr of 3 mg/dL (H)). according to latest data. Based on current GFR, CKD stage is end stage.  Monitor UOP and serial BMP and adjust  therapy as needed. Renally dose meds. Avoid nephrotoxic medications and procedures.    Nephro consult  Has been getting HD MF via right IJ PermCath  Needs f/u with Vascular Surgery for IJ removal  Vascular Surgery consulted for problem cannulating HD access   s/p fistulogram 1/6 with good flow.RN unable to cannulate AVF with arterial and venous needle despite fistulogram    s/p line removal by IR today for 24-48 hr line holiday   Plan for TDC placement by IR in am NPO after MN    Pressure injury of right buttock, stage 3  Wound Care      Anemia in ESRD (end-stage renal disease)  Anemia is likely due to chronic disease due to ESRD. Most recent hemoglobin and hematocrit are listed below.  Recent Labs     01/07/25  0336 01/08/25  0232 01/09/25  0343   HGB 9.2* 9.5* 8.8*   HCT 28.9* 29.9* 27.4*       Plan  - Monitor serial CBC: Daily  - Transfuse PRBC if patient becomes hemodynamically unstable, symptomatic or H/H drops below 7/21.  - Patient has not received any PRBC transfusions to date      Unspecified atrial fibrillation  Patient has paroxysmal (<7 days) atrial fibrillation. Patient is currently in sinus rhythm. [id number] Score: 3. The patients heart rate in the last 24 hours is as follows:  Pulse  Min: 65  Max: 76     Antiarrhythmics       Anticoagulants  heparin (porcine) injection 1,000 Units, As needed (PRN), Intra-Catheter  apixaban tablet 5 mg, 2 times daily, Oral    Plan  - Replete lytes with a goal of K>4, Mg >2  - Patient is anticoagulated, see medications listed above.  - Patient's afib is currently controlled  - Previously had afib, and on Eliquis once a day because of bruising?  As she is being monitored will put on appropriate BID dosing    Bacteremia due to Enterococcus  See severe sepsis      Catheter-associated urinary tract infection  See severe sepsis  1/9 Severe sepsis 2/2 Enterococcus bacteremia and suprapubic UTI.   ID was consulted and she was started on Dapto for Enterococcus. Switched to  Vancomycin .  Had issues with AVF for HD during stay. Vascular surgery was consulted s/p fistulogram 1/6 with good flow. RN unable to cannulate AVF with arterial and venous needle despite fistulogram.   s/p line removal by IR 01/07 for 24-48 hr line holiday; Plan for 8 weeks of Vanc with HD from discharge per ID recs. Vancomycin 500 mg IV after HD. Re-dose when the random level is less than 20    Type 2 diabetes mellitus treated with insulin  HbA1c 6.2, sugars controlled  Home DM regimen:  Largine 15 qHS  Hold scheduled insulin for now with AMS and decreased oral intake  SSI with POCT accuchecks to achieve blood glucose of 140-180 while hospitalized  Hypoglycemia protocol  Diabetic diet        Prophylactic use of anastrozole (Arimidex)  On Arimidex for history of breast cancer      Obesity, diabetes, and hypertension syndrome  Noted    Chronic pain  2/2 lumbar spondylosis  See above      Lumbar spondylosis  Chronic issue  Continue Cymbalta  Continue Robaxin TID PRN  Continue Norco prn      Class 1 obesity due to excess calories with serious comorbidity in adult  Body mass index is 32.54 kg/m². Morbid obesity complicates all aspects of disease management from diagnostic modalities to treatment. Weight loss encouraged and health benefits explained to patient.         Major depressive disorder, recurrent, moderate  Patient has recurrent depression which is moderate and is currently controlled. Will Continue anti-depressant medications. We will not consult psychiatry at this time. Patient does not display psychosis at this time. Continue to monitor closely and adjust plan of care as needed.      Chronic and stable  Continue Cymbalta      Neurogenic bladder  Chronic issue  Has suprapubic cath, replaced 12/31  Continue Oxybutynin      Hyponatremia  Hyponatremia is likely due to volume overload from ESRD. The patient's most recent sodium results are listed below.  Recent Labs     01/07/25  0336 01/08/25  0232  01/09/25  0343   * 124* 123*       Plan  - Correct the sodium by 4-6mEq in 24 hours.   - Will treat the hyponatremia with HD  - Monitor sodium Daily.   - Patient hyponatremia is stable    1/9sodium trended down to 123. . sodium trended down to 123. Fluid restriction 1L/24h . UF with goal 2L today after TDC placement.     Hyperlipidemia  Chronic and stable  Continue Lipitor    Hypothyroidism  Chronic and stable  Continue Synthroid  TSH 1.631        VTE Risk Mitigation (From admission, onward)           Ordered     apixaban tablet 5 mg  2 times daily         01/09/25 0747     heparin (porcine) injection 1,000 Units  As needed (PRN)         01/01/25 1610                    Discharge Planning   FLORENTINO: 1/10/2025     Code Status: Full Code   Medical Readiness for Discharge Date:   Discharge Plan A: Home Health                        Milton Julian MD  Department of Hospital Medicine   [facility] - Internal Medicine Telemetry

## 2025-01-09 NOTE — ASSESSMENT & PLAN NOTE
Anemia is likely due to chronic disease due to ESRD. Most recent hemoglobin and hematocrit are listed below.  Recent Labs     01/07/25  0336 01/08/25  0232 01/09/25  0343   HGB 9.2* 9.5* 8.8*   HCT 28.9* 29.9* 27.4*       Plan  - Monitor serial CBC: Daily  - Transfuse PRBC if patient becomes hemodynamically unstable, symptomatic or H/H drops below 7/21.  - Patient has not received any PRBC transfusions to date

## 2025-01-09 NOTE — PROCEDURES
Radiology Post-Procedure Note    Pre Op Diagnosis: ESRD  Post Op Diagnosis: Same    Procedure: Tunneled HD line placement    Procedure performed by: Froy Garza MD    Written Informed Consent Obtained: Yes  Specimen Removed: NO  Estimated Blood Loss: Minimal    Findings:   R chest wall tunneled HD line placed via EJ access.  No complications.    Patient tolerated procedure well.  Catheter is ready for use.    Froy Garza MD  Interventional Radiologist  Department of Radiology

## 2025-01-09 NOTE — PLAN OF CARE
Problem: Adult Inpatient Plan of Care  Goal: Plan of Care Review  Outcome: Progressing  Goal: Patient-Specific Goal (Individualized)  Outcome: Progressing  Goal: Absence of Hospital-Acquired Illness or Injury  Outcome: Progressing  Goal: Optimal Comfort and Wellbeing  Outcome: Progressing  Goal: Readiness for Transition of Care  Outcome: Progressing     Problem: Skin Injury Risk Increased  Goal: Skin Health and Integrity  Outcome: Progressing     Problem: Hemodialysis  Goal: Safe, Effective Therapy Delivery  Outcome: Progressing  Goal: Effective Tissue Perfusion  Outcome: Progressing  Goal: Absence of Infection Signs and Symptoms  Outcome: Progressing     Problem: Sepsis/Septic Shock  Goal: Optimal Coping  Outcome: Progressing  Goal: Absence of Infection Signs and Symptoms  Outcome: Progressing     Problem: Wound  Goal: Optimal Functional Ability  Outcome: Progressing  Goal: Absence of Infection Signs and Symptoms  Outcome: Progressing  Goal: Skin Health and Integrity  Outcome: Progressing    Pt lying supine in bed watching television in no apparent distress. AAOX4. Pain managed with PRN pain meds.   Bed locked, in lowest position, call light in reach.

## 2025-01-09 NOTE — ASSESSMENT & PLAN NOTE
Patient has paroxysmal (<7 days) atrial fibrillation. Patient is currently in sinus rhythm. [id number] Score: 3. The patients heart rate in the last 24 hours is as follows:  Pulse  Min: 65  Max: 76     Antiarrhythmics       Anticoagulants  heparin (porcine) injection 1,000 Units, As needed (PRN), Intra-Catheter  apixaban tablet 5 mg, 2 times daily, Oral    Plan  - Replete lytes with a goal of K>4, Mg >2  - Patient is anticoagulated, see medications listed above.  - Patient's afib is currently controlled  - Previously had afib, and on Eliquis once a day because of bruising?  As she is being monitored will put on appropriate BID dosing

## 2025-01-09 NOTE — ASSESSMENT & PLAN NOTE
Hyponatremia is likely due to volume overload from ESRD. The patient's most recent sodium results are listed below.  Recent Labs     01/07/25  0336 01/08/25  0232 01/09/25  0343   * 124* 123*       Plan  - Correct the sodium by 4-6mEq in 24 hours.   - Will treat the hyponatremia with HD  - Monitor sodium Daily.   - Patient hyponatremia is stable    1/9sodium trended down to 123. . sodium trended down to 123. Fluid restriction 1L/24h . UF with goal 2L today after TDC placement.

## 2025-01-09 NOTE — PLAN OF CARE
Problem: Adult Inpatient Plan of Care  Goal: Plan of Care Review  Outcome: Progressing  Goal: Patient-Specific Goal (Individualized)  Outcome: Progressing  Goal: Absence of Hospital-Acquired Illness or Injury  Outcome: Progressing  Goal: Optimal Comfort and Wellbeing  Outcome: Progressing  Goal: Readiness for Transition of Care  Outcome: Progressing     Problem: Skin Injury Risk Increased  Goal: Skin Health and Integrity  Outcome: Progressing     Problem: Hemodialysis  Goal: Safe, Effective Therapy Delivery  Outcome: Progressing  Goal: Effective Tissue Perfusion  Outcome: Progressing  Goal: Absence of Infection Signs and Symptoms  Outcome: Progressing     Problem: Diabetes Comorbidity  Goal: Blood Glucose Level Within Targeted Range  Outcome: Progressing     Problem: Sepsis/Septic Shock  Goal: Optimal Coping  Outcome: Progressing  Goal: Absence of Bleeding  Outcome: Progressing  Goal: Blood Glucose Level Within Targeted Range  Outcome: Progressing  Goal: Absence of Infection Signs and Symptoms  Outcome: Progressing  Goal: Optimal Nutrition Intake  Outcome: Progressing     Problem: Acute Kidney Injury/Impairment  Goal: Fluid and Electrolyte Balance  Outcome: Progressing  Goal: Improved Oral Intake  Outcome: Progressing  Goal: Effective Renal Function  Outcome: Progressing     Problem: Infection  Goal: Absence of Infection Signs and Symptoms  Outcome: Progressing     Problem: Chronic Kidney Disease  Goal: Electrolyte Balance  Outcome: Progressing  Goal: Fluid Balance  Outcome: Progressing     Problem: Fall Injury Risk  Goal: Absence of Fall and Fall-Related Injury  Outcome: Progressing     Problem: Chronic Kidney Disease  Goal: Electrolyte Balance  Outcome: Progressing  Goal: Fluid Balance  Outcome: Progressing

## 2025-01-09 NOTE — ASSESSMENT & PLAN NOTE
Pt admitted for severe sepsis 2/2 CAUTI and Enterococcus bacteremia. On arrival febrile to 101.7F, tachycardic 102 with WBC 21 and slow to respond (encephalopathy). Suprapubic catheter changed in the ED. WBC down to 12, Procal 1.4. Urine cx with ESBL Klebsiella and Proteus. Blood cx 12.31 with Enterococcus faecalis. Blood cx 1.2 NGTD.     - ID consulted, appreciate recommendations:  -- 1 time dose of gentamicin  -- Continue vanc for Enterococcus faecalis bacteremia  -- Of note, does have a tunneled line which is likely the culprit of infection however there are issues with her AVF     s/p line removal by IR today for 24-48 hr line holiday;  Plan for TDC placement by IR in am NPO after MN    1/9 Severe sepsis 2/2 Enterococcus bacteremia and suprapubic UTI.   ID was consulted and she was started on Dapto for Enterococcus. Switched to Vancomycin .  Had issues with AVF for HD during stay. Vascular surgery was consulted s/p fistulogram 1/6 with good flow. RN unable to cannulate AVF with arterial and venous needle despite fistulogram.   s/p line removal by IR 01/07 for 24-48 hr line holiday; Plan for 8 weeks of Vanc with HD from discharge per ID recs

## 2025-01-09 NOTE — ASSESSMENT & PLAN NOTE
See severe sepsis  1/9 Severe sepsis 2/2 Enterococcus bacteremia and suprapubic UTI.   ID was consulted and she was started on Dapto for Enterococcus. Switched to Vancomycin .  Had issues with AVF for HD during stay. Vascular surgery was consulted s/p fistulogram 1/6 with good flow. RN unable to cannulate AVF with arterial and venous needle despite fistulogram.   s/p line removal by IR 01/07 for 24-48 hr line holiday; Plan for 8 weeks of Vanc with HD from discharge per ID recs.

## 2025-01-09 NOTE — PROGRESS NOTES
[facility] NOTE     Patient currently on hemodialysis for removal of uremic toxins .     Patient seen and evaluated on hemodialysis, tolerating treatment, see HD flowsheet for vitals and assessments.      No Hypotension, chest pain, shortness of breath, cramping, nausea or vomiting.     Following patient for the management of ESRD    Target UF: 2 L as tolerated, keep MAP >65.  Sp CVC placement with IR. Tolerating a  mL/min at this time. No distress on exam.   Continue EPO, Hgb 8.8  Phos 5.0, on binders  Remain on sodium bicarbonate. Can continue for now.   Consider renal diet restrictions; please limit daily intake to 32 oz per day.   No lab stick/BP intake on access site  Continue to monitor intake and output, daily weights   Please avoid gadolinium, fleets, phos-based laxatives, NSAIDs  Will follow closely and continue dialysis treatments while in-patient    JUAN Porter DNP, APRN, FNP-C  Department of Nephrology  [facility]  Pager: [phone]

## 2025-01-09 NOTE — NURSING
Patient arrived in a stretcher to dialysis unit. AAOx4    Report received from JULIO CESAR Whittington RN.      Hemodialysis initiated using the following:  Dialysis Access: Right IJ Tunneled Catheter     Tolerated well, flows good.     UF goal 2 L as tolerated

## 2025-01-09 NOTE — ASSESSMENT & PLAN NOTE
Creatine stable for now. BMP reviewed- noted Estimated Creatinine Clearance: 20.7 mL/min (A) (based on SCr of 3 mg/dL (H)). according to latest data. Based on current GFR, CKD stage is end stage.  Monitor UOP and serial BMP and adjust therapy as needed. Renally dose meds. Avoid nephrotoxic medications and procedures.    Nephro consult  Has been getting HD MF via right IJ PermCath  Needs f/u with Vascular Surgery for IJ removal  Vascular Surgery consulted for problem cannulating HD access   s/p fistulogram 1/6 with good flow.RN unable to cannulate AVF with arterial and venous needle despite fistulogram    s/p line removal by IR today for 24-48 hr line holiday   Plan for TDC placement by IR in am NPO after MN

## 2025-01-09 NOTE — ASSESSMENT & PLAN NOTE
Patient's blood pressure range in the last 24 hours was: BP  Min: 122/67  Max: 188/71.The patient's inpatient anti-hypertensive regimen is listed below:  Current Antihypertensives  losartan tablet 100 mg, Daily, Oral  carvediloL tablet 12.5 mg, 2 times daily, Oral  amLODIPine tablet 10 mg, Daily, Oral    Plan  - BP increasing since holding BP meds for sepsis  - continue Coreg and Losartan.  - restarting home norvasc

## 2025-01-09 NOTE — NURSING
3.5 hours HD tx completed. 2 L of fluid removed.    Patient tolerated well.    Blood returned. Lines flushed with NS. Catheter locked with Heparin. Clamped, capped and wrapped with sterile gauze.    Report given to JULIO CESAR Whittington RN.

## 2025-01-10 DIAGNOSIS — E78.5 HYPERLIPIDEMIA ASSOCIATED WITH TYPE 2 DIABETES MELLITUS: ICD-10-CM

## 2025-01-10 DIAGNOSIS — E11.69 HYPERLIPIDEMIA ASSOCIATED WITH TYPE 2 DIABETES MELLITUS: ICD-10-CM

## 2025-01-10 LAB
ALBUMIN SERPL BCP-MCNC: 2.6 G/DL (ref 3.5–5.2)
ALP SERPL-CCNC: 92 U/L (ref 40–150)
ALT SERPL W/O P-5'-P-CCNC: 10 U/L (ref 10–44)
ANION GAP SERPL CALC-SCNC: 10 MMOL/L (ref 8–16)
AST SERPL-CCNC: 12 U/L (ref 10–40)
BASOPHILS # BLD AUTO: 0.04 K/UL (ref 0–0.2)
BASOPHILS NFR BLD: 0.4 % (ref 0–1.9)
BILIRUB SERPL-MCNC: 0.3 MG/DL (ref 0.1–1)
BUN SERPL-MCNC: 23 MG/DL (ref 8–23)
CALCIUM SERPL-MCNC: 8.1 MG/DL (ref 8.7–10.5)
CHLORIDE SERPL-SCNC: 92 MMOL/L (ref 95–110)
CO2 SERPL-SCNC: 23 MMOL/L (ref 23–29)
CREAT SERPL-MCNC: 2.1 MG/DL (ref 0.5–1.4)
DIFFERENTIAL METHOD BLD: ABNORMAL
EOSINOPHIL # BLD AUTO: 0.3 K/UL (ref 0–0.5)
EOSINOPHIL NFR BLD: 3.8 % (ref 0–8)
ERYTHROCYTE [DISTWIDTH] IN BLOOD BY AUTOMATED COUNT: 12.9 % (ref 11.5–14.5)
EST. GFR  (NO RACE VARIABLE): 24.6 ML/MIN/1.73 M^2
GLUCOSE SERPL-MCNC: 132 MG/DL (ref 70–110)
HCT VFR BLD AUTO: 28.4 % (ref 37–48.5)
HGB BLD-MCNC: 9 G/DL (ref 12–16)
IMM GRANULOCYTES # BLD AUTO: 0.09 K/UL (ref 0–0.04)
IMM GRANULOCYTES NFR BLD AUTO: 1 % (ref 0–0.5)
LYMPHOCYTES # BLD AUTO: 1.8 K/UL (ref 1–4.8)
LYMPHOCYTES NFR BLD: 20.1 % (ref 18–48)
MAGNESIUM SERPL-MCNC: 1.6 MG/DL (ref 1.6–2.6)
MCH RBC QN AUTO: 29.2 PG (ref 27–31)
MCHC RBC AUTO-ENTMCNC: 31.7 G/DL (ref 32–36)
MCV RBC AUTO: 92 FL (ref 82–98)
MONOCYTES # BLD AUTO: 1.1 K/UL (ref 0.3–1)
MONOCYTES NFR BLD: 12 % (ref 4–15)
NEUTROPHILS # BLD AUTO: 5.6 K/UL (ref 1.8–7.7)
NEUTROPHILS NFR BLD: 62.7 % (ref 38–73)
NRBC BLD-RTO: 0 /100 WBC
PHOSPHATE SERPL-MCNC: 3.6 MG/DL (ref 2.7–4.5)
PLATELET # BLD AUTO: 253 K/UL (ref 150–450)
PMV BLD AUTO: 9.3 FL (ref 9.2–12.9)
POCT GLUCOSE: 129 MG/DL (ref 70–110)
POCT GLUCOSE: 150 MG/DL (ref 70–110)
POCT GLUCOSE: 177 MG/DL (ref 70–110)
POCT GLUCOSE: 245 MG/DL (ref 70–110)
POTASSIUM SERPL-SCNC: 4.1 MMOL/L (ref 3.5–5.1)
PROT SERPL-MCNC: 6.1 G/DL (ref 6–8.4)
RBC # BLD AUTO: 3.08 M/UL (ref 4–5.4)
SODIUM SERPL-SCNC: 125 MMOL/L (ref 136–145)
VANCOMYCIN SERPL-MCNC: 16.3 UG/ML
WBC # BLD AUTO: 8.91 K/UL (ref 3.9–12.7)

## 2025-01-10 PROCEDURE — 94761 N-INVAS EAR/PLS OXIMETRY MLT: CPT | Mod: HCNC

## 2025-01-10 PROCEDURE — 27000207 HC ISOLATION: Mod: HCNC

## 2025-01-10 PROCEDURE — 80202 ASSAY OF VANCOMYCIN: CPT | Mod: HCNC | Performed by: STUDENT IN AN ORGANIZED HEALTH CARE EDUCATION/TRAINING PROGRAM

## 2025-01-10 PROCEDURE — 25000003 PHARM REV CODE 250: Mod: HCNC | Performed by: NURSE PRACTITIONER

## 2025-01-10 PROCEDURE — 84100 ASSAY OF PHOSPHORUS: CPT | Mod: HCNC

## 2025-01-10 PROCEDURE — 80053 COMPREHEN METABOLIC PANEL: CPT | Mod: HCNC

## 2025-01-10 PROCEDURE — 63600175 PHARM REV CODE 636 W HCPCS: Mod: HCNC

## 2025-01-10 PROCEDURE — 25000003 PHARM REV CODE 250: Mod: HCNC

## 2025-01-10 PROCEDURE — 25000003 PHARM REV CODE 250: Mod: HCNC | Performed by: HOSPITALIST

## 2025-01-10 PROCEDURE — 63600175 PHARM REV CODE 636 W HCPCS: Mod: TB,HCNC | Performed by: NURSE PRACTITIONER

## 2025-01-10 PROCEDURE — 21400001 HC TELEMETRY ROOM: Mod: HCNC

## 2025-01-10 PROCEDURE — 90935 HEMODIALYSIS ONE EVALUATION: CPT | Mod: HCNC,,, | Performed by: NURSE PRACTITIONER

## 2025-01-10 PROCEDURE — 83735 ASSAY OF MAGNESIUM: CPT | Mod: HCNC

## 2025-01-10 PROCEDURE — 85025 COMPLETE CBC W/AUTO DIFF WBC: CPT | Mod: HCNC

## 2025-01-10 PROCEDURE — 80100014 HC HEMODIALYSIS 1:1: Mod: HCNC

## 2025-01-10 PROCEDURE — 63600175 PHARM REV CODE 636 W HCPCS: Mod: HCNC | Performed by: HOSPITALIST

## 2025-01-10 RX ORDER — ATORVASTATIN CALCIUM 80 MG/1
80 TABLET, FILM COATED ORAL DAILY
Qty: 90 TABLET | Refills: 0 | Status: SHIPPED | OUTPATIENT
Start: 2025-01-10 | End: 2025-01-11 | Stop reason: HOSPADM

## 2025-01-10 RX ORDER — SODIUM CHLORIDE 9 MG/ML
INJECTION, SOLUTION INTRAVENOUS ONCE
Status: CANCELLED | OUTPATIENT
Start: 2025-01-10 | End: 2025-01-10

## 2025-01-10 RX ADMIN — CARVEDILOL 12.5 MG: 12.5 TABLET, FILM COATED ORAL at 09:01

## 2025-01-10 RX ADMIN — HYDROCODONE BITARTRATE AND ACETAMINOPHEN 1 TABLET: 10; 325 TABLET ORAL at 02:01

## 2025-01-10 RX ADMIN — SEVELAMER CARBONATE 800 MG: 800 TABLET, FILM COATED ORAL at 08:01

## 2025-01-10 RX ADMIN — IPRATROPIUM BROMIDE 2 SPRAY: 42 SPRAY, METERED NASAL at 09:01

## 2025-01-10 RX ADMIN — HEPARIN SODIUM 1000 UNITS: 1000 INJECTION, SOLUTION INTRAVENOUS; SUBCUTANEOUS at 01:01

## 2025-01-10 RX ADMIN — AMLODIPINE BESYLATE 10 MG: 10 TABLET ORAL at 08:01

## 2025-01-10 RX ADMIN — OXYBUTYNIN CHLORIDE 10 MG: 10 TABLET, EXTENDED RELEASE ORAL at 08:01

## 2025-01-10 RX ADMIN — DULOXETINE HYDROCHLORIDE 40 MG: 20 CAPSULE, DELAYED RELEASE ORAL at 08:01

## 2025-01-10 RX ADMIN — INSULIN ASPART 4 UNITS: 100 INJECTION, SOLUTION INTRAVENOUS; SUBCUTANEOUS at 05:01

## 2025-01-10 RX ADMIN — CARVEDILOL 12.5 MG: 12.5 TABLET, FILM COATED ORAL at 08:01

## 2025-01-10 RX ADMIN — HYDROCODONE BITARTRATE AND ACETAMINOPHEN 1 TABLET: 10; 325 TABLET ORAL at 04:01

## 2025-01-10 RX ADMIN — ATORVASTATIN CALCIUM 80 MG: 40 TABLET, FILM COATED ORAL at 08:01

## 2025-01-10 RX ADMIN — VANCOMYCIN HYDROCHLORIDE 750 MG: 750 INJECTION, POWDER, LYOPHILIZED, FOR SOLUTION INTRAVENOUS at 02:01

## 2025-01-10 RX ADMIN — LOSARTAN POTASSIUM 100 MG: 50 TABLET, FILM COATED ORAL at 08:01

## 2025-01-10 RX ADMIN — HYDROCODONE BITARTRATE AND ACETAMINOPHEN 1 TABLET: 10; 325 TABLET ORAL at 09:01

## 2025-01-10 RX ADMIN — METHOCARBAMOL 750 MG: 750 TABLET ORAL at 09:01

## 2025-01-10 RX ADMIN — SODIUM BICARBONATE 650 MG TABLET 650 MG: at 08:01

## 2025-01-10 RX ADMIN — ANASTROZOLE 1 MG: 1 TABLET, COATED ORAL at 08:01

## 2025-01-10 RX ADMIN — METHOCARBAMOL 750 MG: 750 TABLET ORAL at 08:01

## 2025-01-10 RX ADMIN — APIXABAN 5 MG: 5 TABLET, FILM COATED ORAL at 08:01

## 2025-01-10 RX ADMIN — HYDROCODONE BITARTRATE AND ACETAMINOPHEN 1 TABLET: 10; 325 TABLET ORAL at 08:01

## 2025-01-10 RX ADMIN — EPOETIN ALFA-EPBX 4900 UNITS: 10000 INJECTION, SOLUTION INTRAVENOUS; SUBCUTANEOUS at 12:01

## 2025-01-10 RX ADMIN — TRAZODONE HYDROCHLORIDE 100 MG: 100 TABLET ORAL at 09:01

## 2025-01-10 RX ADMIN — LEVOTHYROXINE SODIUM 50 MCG: 0.05 TABLET ORAL at 06:01

## 2025-01-10 RX ADMIN — SEVELAMER CARBONATE 800 MG: 800 TABLET, FILM COATED ORAL at 04:01

## 2025-01-10 RX ADMIN — APIXABAN 5 MG: 5 TABLET, FILM COATED ORAL at 09:01

## 2025-01-10 RX ADMIN — IPRATROPIUM BROMIDE 2 SPRAY: 42 SPRAY, METERED NASAL at 08:01

## 2025-01-10 NOTE — NURSING
Dialysis started to [facility].  Tolerated well.    Contact isolation precautions maintained.

## 2025-01-10 NOTE — ASSESSMENT & PLAN NOTE
Anemia is likely due to chronic disease due to ESRD. Most recent hemoglobin and hematocrit are listed below.  Recent Labs     01/09/25  0343 01/10/25  0230 01/11/25  0514   HGB 8.8* 9.0* 9.1*   HCT 27.4* 28.4* 28.0*     Plan  - Monitor serial CBC: Daily  - Transfuse PRBC if patient becomes hemodynamically unstable, symptomatic or H/H drops below 7/21.  - Patient has not received any PRBC transfusions to date

## 2025-01-10 NOTE — PROGRESS NOTES
Pharmacokinetic Assessment Follow Up: IV Vancomycin    Vancomycin serum concentration assessment(s)/Regimen Plan:    Vancomycin random 16.3   HD ordered 1/10  Will order Vancomycin 750 mg IV x1 for after HD  Re-dose when the random level is less than 20 mcg/mL  Next VR ordered for 1/13 w/AM labs  Will continue to monitor and order VR sooner based on HD schedule    Drug levels (last 3 results):  Recent Labs   Lab Result Units 01/08/25  0232 01/10/25  0230   Vancomycin, Random ug/mL 18.7 16.3       Pharmacy will continue to follow and monitor vancomycin.    Please contact pharmacy at extension [phone] for questions regarding this assessment.    Thank you for the consult,   Melida Roche       Patient brief summary:  Cecile Bowen is a 74 y.o. female initiated on antimicrobial therapy with IV Vancomycin for treatment of bacteremia    The patient's current regimen is dose after HD depending random levels    Drug Allergies:   Review of patient's allergies indicates:  No Known Allergies    Actual Body Weight:   97.1 kg    Renal Function:   Estimated Creatinine Clearance: 29.5 mL/min (A) (based on SCr of 2.1 mg/dL (H)).,     Dialysis Method (if applicable):  intermittent HD    CBC (last 72 hours):  Recent Labs   Lab Result Units 01/08/25 0232 01/09/25  0343 01/10/25  0230   WBC K/uL 7.94 7.29 8.91   Hemoglobin g/dL 9.5* 8.8* 9.0*   Hematocrit % 29.9* 27.4* 28.4*   Platelets K/uL 276 240 253   Gran % % 55.8 61.1 62.7   Lymph % % 24.2 21.8 20.1   Mono % % 13.9 11.0 12.0   Eosinophil % % 4.5 4.9 3.8   Basophil % % 0.5 0.4 0.4   Differential Method  Automated Automated Automated       Metabolic Panel (last 72 hours):  Recent Labs   Lab Result Units 01/08/25  0232 01/09/25  0343 01/10/25  0230   Sodium mmol/L 124* 123* 125*   Potassium mmol/L 4.1 4.6 4.1   Chloride mmol/L 93* 93* 92*   CO2 mmol/L 19* 21* 23   Glucose mg/dL 151* 172* 132*   BUN mg/dL 29* 34* 23   Creatinine mg/dL 2.6* 3.0* 2.1*   Albumin g/dL 2.7* 2.3* 2.6*    Total Bilirubin mg/dL 0.3 0.3 0.3   Alkaline Phosphatase U/L 85 78 92   AST U/L 21 10 12   ALT U/L 14 13 10   Magnesium mg/dL 1.6 1.6 1.6   Phosphorus mg/dL 4.5 5.0* 3.6       Vancomycin Administrations:  vancomycin given in the last 96 hours                     vancomycin (VANCOCIN) 500 mg in D5W 100 mL IVPB (MB+) (mg) 500 mg New Bag 01/09/25 1718    vancomycin (VANCOCIN) 500 mg in D5W 100 mL IVPB (MB+) (mg) 500 mg New Bag 01/06/25 1859                    Microbiologic Results:  Microbiology Results (last 7 days)       Procedure Component Value Units Date/Time    Blood culture [[id number]] Collected: 01/02/25 0529    Order Status: Completed Specimen: Blood from Peripheral, Hand, Right Updated: 01/07/25 0812     Blood Culture, Routine No growth after 5 days.    Blood culture [[id number]] Collected: 01/02/25 0526    Order Status: Completed Specimen: Blood from Peripheral, Antecubital, Right Updated: 01/07/25 0812     Blood Culture, Routine No growth after 5 days.    Urine culture [[id number]]  (Abnormal)  (Susceptibility) Collected: 12/31/24 1049    Order Status: Completed Specimen: Urine Updated: 01/05/25 1445     Urine Culture, Routine KLEBSIELLA PNEUMONIAE ESBL  50,000 - 99,999 cfu/ml        PROTEUS MIRABILIS  10,000 - 49,999 cfu/ml      Narrative:      Specimen Source->Urine    Blood culture x two cultures. Draw prior to antibiotics. [[id number]]  (Abnormal) Collected: 12/31/24 0937    Order Status: Completed Specimen: Blood from Peripheral, Antecubital, Right Updated: 01/03/25 1431     Blood Culture, Routine Gram stain aer bottle: Gram positive cocci in chains resembling Strep      Gram stain zelda bottle: Gram positive cocci in chains resembling Strep      Results called to and read back by: Sean Hernandez RN  01/01/2025  10:31      ENTEROCOCCUS FAECALIS  For susceptibility see order #[id number]      Narrative:      Aerobic and anaerobic    Blood culture x two cultures. Draw prior to antibiotics. [[id number]]   (Abnormal)  (Susceptibility) Collected: 12/31/24 0937    Order Status: Completed Specimen: Blood from Peripheral, Antecubital, Right Updated: 01/03/25 1429     Blood Culture, Routine Gram stain aer bottle: Gram positive cocci in chains resembling Strep      Gram stain zelda bottle: Gram positive cocci in chains resembling Strep      Results called to and read back by: Sean Hernandez RN  01/01/2025  10:31      ENTEROCOCCUS FAECALIS    Narrative:      Aerobic and anaerobic

## 2025-01-10 NOTE — NURSING
4 hour dialysis complete.  Blood returned.  [facility] flushed, heparin locked, capped and wrapped in gauze.    Net UF 1.5L.      Epo given.    Tolerated well.    Transported from [facility] to room 1110 via stretcher by transporter.

## 2025-01-10 NOTE — PLAN OF CARE
Problem: Adult Inpatient Plan of Care  Goal: Plan of Care Review  Outcome: Progressing  Goal: Patient-Specific Goal (Individualized)  Outcome: Progressing  Goal: Absence of Hospital-Acquired Illness or Injury  Outcome: Progressing  Goal: Optimal Comfort and Wellbeing  Outcome: Progressing  Goal: Readiness for Transition of Care  Outcome: Progressing     Problem: Skin Injury Risk Increased  Goal: Skin Health and Integrity  Outcome: Progressing     Problem: Hemodialysis  Goal: Safe, Effective Therapy Delivery  Outcome: Progressing  Goal: Effective Tissue Perfusion  Outcome: Progressing  Goal: Absence of Infection Signs and Symptoms  Outcome: Progressing     Problem: Diabetes Comorbidity  Goal: Blood Glucose Level Within Targeted Range  Outcome: Progressing     Problem: Sepsis/Septic Shock  Goal: Optimal Coping  Outcome: Progressing  Goal: Absence of Bleeding  Outcome: Progressing  Goal: Blood Glucose Level Within Targeted Range  Outcome: Progressing  Goal: Absence of Infection Signs and Symptoms  Outcome: Progressing  Goal: Optimal Nutrition Intake  Outcome: Progressing     Problem: Acute Kidney Injury/Impairment  Goal: Fluid and Electrolyte Balance  Outcome: Progressing  Goal: Improved Oral Intake  Outcome: Progressing  Goal: Effective Renal Function  Outcome: Progressing     Problem: Infection  Goal: Absence of Infection Signs and Symptoms  Outcome: Progressing     Problem: Wound  Goal: Optimal Functional Ability  Outcome: Progressing  Goal: Absence of Infection Signs and Symptoms  Outcome: Progressing  Goal: Improved Oral Intake  Outcome: Progressing  Goal: Skin Health and Integrity  Outcome: Progressing  Goal: Optimal Wound Healing  Outcome: Progressing     Problem: Chronic Kidney Disease  Goal: Electrolyte Balance  Outcome: Progressing  Goal: Fluid Balance  Outcome: Progressing     Problem: Fall Injury Risk  Goal: Absence of Fall and Fall-Related Injury  Outcome: Progressing     Problem: Chronic Kidney  Disease  Goal: Electrolyte Balance  Outcome: Progressing  Goal: Fluid Balance  Outcome: Progressing

## 2025-01-10 NOTE — NURSING
Pt loss IV access- attempt x 1 for new PIV in RFA.  PICC consult placed for US guidance, can only  try  on patients  Right arm.

## 2025-01-10 NOTE — ASSESSMENT & PLAN NOTE
Hyponatremia is likely due to volume overload from ESRD. The patient's most recent sodium results are listed below.  Recent Labs     01/09/25  0343 01/10/25  0230 01/11/25  0514   * 125* 127*     Plan  - Correct the sodium by 4-6mEq in 24 hours.   - Will treat the hyponatremia with HD  - Monitor sodium Daily.   - Patient hyponatremia is stable    1/9 sodium trended down to 123. . sodium trended down to 123. Fluid restriction 1L/24h . UF with goal 2L today after TDC placement.   1/10 sodium improved to 125.   1/11 sodium improved from  125 to 127.  Nephrology follow up - started lasix 80mg PO QDaily. Follow up with outpatient HD on 1/12.

## 2025-01-10 NOTE — PLAN OF CARE
Recommendation/Intervention:   1) Continue current diabetic diet as tolerated, encourage PO intake              - Fluids per MD  2) RD to monitor and follow-up as needed     Goals: Meet % of EEN/EPN by next RD follow-up  Nutrition Goal Status: new  Communication of RD Recs: other (comment) (POC)

## 2025-01-10 NOTE — PLAN OF CARE
01/10/25 1731   Discharge Reassessment   Assessment Type Discharge Planning Reassessment   Did the patient's condition or plan change since previous assessment? No   Discharge Plan discussed with: Patient   Communicated FLORENTINO with patient/caregiver Date not available/Unable to determine   Discharge Plan A Home Health  (will need resumption order for [facility].  To receive Vanc on dialysis days for 8 weeks post discharge)   Why the patient remains in the hospital Requires continued medical care

## 2025-01-10 NOTE — PROGRESS NOTES
"[facility] - Internal Medicine Telemetry  Adult Nutrition  Initial Assessment- LOS    SUMMARY     Recommendation/Intervention:   1) Continue current diabetic diet as tolerated, encourage PO intake   - Fluids per MD  2) RD to monitor and follow-up as needed    Goals: Meet % of EEN/EPN by next RD follow-up  Nutrition Goal Status: new  Communication of RD Recs: other (comment) (POC)    Assessment and Plan    No acute nutrition diagnosis at this time      Reason for Assessment    Reason For Assessment: length of stay  Diagnosis: infection/sepsis  General Information Comments: Pt screened by RD for LOS. PMH of ESRD on HD, T2DM, afib on Eliquis, HTN, obesity, history of breast cancer, chronic suprapubic catheter for neurogenic bladder and chronic back pain. On diabetic diet at this time with 1000 mL fluid restriction, good appetite consuming % of recent meals.  Nutrition Discharge Planning: Diabetic/Renal Diet    Nutrition/Diet History    Food Allergies: NKFA  Nutrition Related Social Determinants of Health: SDOH: Unable to assess at this time.   Food Insecurity: No Food Insecurity (12/31/2024)    Hunger Vital Sign     Worried About Running Out of Food in the Last Year: Never true     Ran Out of Food in the Last Year: Never true       Anthropometrics    Temp: 98.2 °F (36.8 °C)  Height Method: Stated  Height: 5' 8" (172.7 cm)  Height (inches): 68 in  Weight Method: Bed Scale  Weight: 97.1 kg (214 lb)  Weight (lb): 214 lb  Ideal Body Weight (IBW), Female: 140 lb  % Ideal Body Weight, Female (lb): 152.86 %  BMI (Calculated): 32.5  BMI Grade: 30 - 34.9- obesity - grade I       Lab/Procedures/Meds    Pertinent Labs Reviewed: reviewed  Pertinent Labs Comments: Hgb: 9, Hct: 28.4, Na: 125, Creatinine: 2.1, eGFR: 24.6, Glucose: 132, Calcium: 8.1, HBA1C: 5.9  Pertinent Medications Reviewed: reviewed   amLODIPine  10 mg Oral Daily    anastrozole  1 mg Oral Daily    apixaban  5 mg Oral BID    atorvastatin  80 mg Oral Daily "    carvediloL  12.5 mg Oral BID    DULoxetine  40 mg Oral Daily    epoetin chloe-ebpx (RETACRIT) injection  50 Units/kg Intravenous Every Mon, Wed, Fri    ipratropium  2 spray Each Nostril BID    levothyroxine  50 mcg Oral Before breakfast    losartan  100 mg Oral Daily    oxybutynin  10 mg Oral Daily    sevelamer carbonate  800 mg Oral TID WM    sodium bicarbonate  650 mg Oral Daily    vancomycin (VANCOCIN) IV (PEDS and ADULTS)  750 mg Intravenous Once     Estimated/Assessed Needs    Weight Used For Calorie Calculations: 97.1 kg (214 lb 1.1 oz)  Energy Calorie Requirements (kcal): 1836 kcal/day  Energy Need Method: Powell-St Jeor (x 1.2)  Protein Requirements: 95 g (1.5 g/kg IBW)  Weight Used For Protein Calculations: 63.5 kg (140 lb)  Fluid Requirements (mL): 1 mL/kcal or per MD  Estimated Fluid Requirement Method: RDA Method  RDA Method (mL): 1836  CHO Requirement: 230 g      Nutrition Prescription Ordered    Current Diet Order: Diabetic Diet - 2000 kcal  Nutrition Order Comments: 1000 mL fluid restriction    Evaluation of Received Nutrient/Fluid Intake    I/O: - 14.8 L since admit  Energy Calories Required: meeting needs  Protein Required: meeting needs  Fluid Required: other (see comments) (As per MD)  Comments: LBM 1/5  Tolerance: tolerating  % Intake of Estimated Energy Needs: 75 - 100 %  % Meal Intake: 75 - 100 %    Nutrition Risk    Level of Risk/Frequency of Follow-up: low     Monitor and Evaluation    Food and Nutrient Intake: energy intake, food and beverage intake  Food and Nutrient Adminstration: diet order  Anthropometric Measurements: weight, weight change, body mass index  Biochemical Data, Medical Tests and Procedures: electrolyte and renal panel, gastrointestinal profile, glucose/endocrine profile, inflammatory profile, lipid profile  Nutrition-Focused Physical Findings: overall appearance     Nutrition Follow-Up    RD Follow-up?: Yes

## 2025-01-10 NOTE — PROGRESS NOTES
[facility] NOTE     Patient currently on hemodialysis for removal of uremic toxins .     Patient seen and evaluated on hemodialysis, tolerating treatment, see HD flowsheet for vitals and assessments.      No Hypotension, chest pain, shortness of breath, cramping, nausea or vomiting.     Following patient for the management of ESRD    Target UF: 1.5 L as tolerated, keep MAP >65.  Sp CVC placement with IR. Tolerating a  mL/min at this time. No distress on exam.   Na 125 from 123 despite HD treatment yesterday. Repeat HD today for metabolic clearance. Na bath 135. Discussed need for fluid restrictions, recommend 1 L fluid restriction today.   Continue EPO, Hgb 9.0  Phos 3.6, on binders  Remain on sodium bicarbonate. Can continue for now.   Consider renal diet restrictions; please limit daily intake to 32 oz per day.   No lab stick/BP intake on access site  Continue to monitor intake and output, daily weights   Please avoid gadolinium, fleets, phos-based laxatives, NSAIDs  Will follow closely and continue dialysis treatments while in-patient    JUAN Porter DNP, APRN, FNP-C  Department of Nephrology  [facility]  Pager: [phone]

## 2025-01-10 NOTE — NURSING
Unable to complete airstrip- Telemetry in place-  heart rate and rhythm charted in shift assessment.

## 2025-01-10 NOTE — CONSULTS
Received consult for PIV placement.  Pt noted to have received a PIV after consult was placed.  If further services needed, please re-consult.

## 2025-01-10 NOTE — TELEPHONE ENCOUNTER
Refill Routing Note   Medication(s) are not appropriate for processing by [facility] for the following reason(s):        Non-participating provider    [facility] action(s):  Route             Appointments  past 12m or future 3m with PCP    Date Provider   Last Visit   10/2/2024 Lurdes Navarro MD   Next Visit   Visit date not found Lurdes Navarro MD   ED visits in past 90 days: 0        Note composed:9:26 AM 01/10/2025

## 2025-01-10 NOTE — ASSESSMENT & PLAN NOTE
See severe sepsis  1/9 Severe sepsis 2/2 Enterococcus bacteremia and suprapubic UTI.   ID was consulted and she was started on Dapto for Enterococcus. Switched to Vancomycin .  Had issues with AVF for HD during stay. Vascular surgery was consulted s/p fistulogram 1/6 with good flow. RN unable to cannulate AVF with arterial and venous needle despite fistulogram.   s/p line removal by IR 01/07 for 24-48 hr line holiday; Plan for 8 weeks of Vanc with HD from discharge per ID recs. Vancomycin 500 mg IV after HD. Re-dose when the random level is less than 20

## 2025-01-10 NOTE — PROGRESS NOTES
Petros Shaffer - Internal Medicine [facility] Medicine  Progress Note    Patient Name: Cecile Bowen  MRN: [id number]  Patient Class: IP- Inpatient   Admission Date: 12/31/2024  Length of Stay: 10 days  Attending Physician: Milton Julian MD  Primary Care Provider: Lurdes Navarro MD        Subjective     Principal Problem:Severe sepsis        HPI:  Ms. Cecile Bowen is a 74 y.o. female with ESRD on HD, T2DM, afib on Eliquis, HTN, obesity, history of breast cancer, chronic suprapubic catheter for neurogenic bladder and chronic back pain who presented to the [facility] ED 12/31 from Vascular Surgery for evaluation of fever and AMS.  History is obtained from patient.  She reports that over the last few days, she has been having fever and lethargy.  She went to see her Vascular Surgeon for right IJ PermCath removal, as her fistula has matured.  She is currently dialyzing only Mondays and Fridays via right IJ PermCath.  However, upon arrival to clinic she was febrile to 100.9F, slow to respond and tachy to 104.  She was sent to the ER for further evaluation.  At baseline, she uses a wheelchair.  She lives with her sister.  She currently endorses back pain that is not escalated from normal.  She is full code.  She took all of her medications prior to going to clinic except her Eliquis.    Upon arrival to the ER, vitals were temp 101.7F, , /77.  Labs showed WBC 20, Hgb 11.3, Sodium 129, BUN/Cr 28/3.2, Lactic 0.8.  UA was dirty.  Suprapubic catheter was exchanged in the ED.  CXR was negative.  She was given Vanc and Zosyn was admitted to Hospital Medicine for further management.    Overview/Hospital Course:  Ms. Bowen is a 74 y.o. female with ESRD on HD, T2DM, afib on Eliquis, HTN, obesity, history of breast cancer, chronic suprapubic catheter for neurogenic bladder and chronic back pain who was admitted for severe sepsis 2/2 Enterococcus bacteremia and suprapubic UTI.  She was started on Zosyn.   After her blood cx returned positive, ID was consulted and she was started on Dapto for Enterococcus. Switched to Vanc . Plan for 8 weeks of Vanc with HD from discharge Urine cx returned with ESBL Klebsiella and Proteus, unclear if this is a UTI or just colonization. S/p one dose of gentamicin per ID Had issues with AVF for HD during stay. Vascular surgery was consulted s/p fistulogram 1/6 with good flow. RN unable to cannulate AVF with arterial and venous needle despite fistulogram  s/p line removal by IR today for 24-48 hr line holiday; Plan for TDC placement by IR in am NPO after MN will need OP follow up with Vascular    1/9 Severe sepsis 2/2 Enterococcus bacteremia and suprapubic UTI.   ID was consulted and she was started on Dapto for Enterococcus. Switched to Vancomycin .  Had issues with AVF for HD during stay. Vascular surgery was consulted s/p fistulogram 1/6 with good flow. RN unable to cannulate AVF with arterial and venous needle despite fistulogram.   s/p line removal by IR 01/07 for 24-48 hr line holiday; Plan for 8 weeks of Vanc with HD from discharge per ID recs.   Vancomycin 500 mg IV after HD. Re-dose when the random level is less than 20. s/p DC placement by IR today. HD today after TDC placement. sodium trended down to 123. Fluid restriction 1L/24h . UF with goal 2L today. needs HD today after TDC placement. hold eliquis today for TDC.  needs OP follow up with Vascular surgery   1/10 sodium improved to 125. Plan for HD today           Review of Systems:   Pain scale:   Constitutional:  fever,  chills, headache, vision loss, hearing loss, weight loss, Generalized weakness, falls, loss of smell, loss of taste, poor appetite,  sore throat  Respiratory: cough, shortness of breath.   Cardiovascular: chest pain, dizziness, palpitations, orthopnea, swelling of feet, syncope  Gastrointestinal: nausea, vomiting, abdominal pain, diarrhea, black stool,  blood in stool, change in bowel habits,  constipation  Genitourinary: hematuria, dysuria, urgency, frequency  Integument/Breast: rash,  pruritis  Hematologic/Lymphatic: easy bruising, lymphadenopathy  Musculoskeletal: arthralgias , myalgias, back pain, neck pain, knee pain  Neurological: confusion, seizures, tremors, slurred speech  Behavioral/Psych:  depression, anxiety, auditory or visual hallucinations     OBJECTIVE:     Physical Exam:  Body mass index is 32.54 kg/m².    Constitutional: Appears well-developed and well-nourished. obesity  Head: Normocephalic and atraumatic.   Neck: Normal range of motion. Neck supple.   Cardiovascular: Normal heart rate.  Regular heart rhythm.  Pulmonary/Chest: Effort normal.   Abdominal: No distension.  No tenderness.suprapubic catheter   Musculoskeletal: Normal range of motion. No edema.   Neurological: Alert and oriented to person, place, and time.   Skin: Skin is warm and dry.   Psychiatric: Normal mood and affect. Behavior is normal.                  Vital Signs  Temp: 98.2 °F (36.8 °C) (01/10/25 0756)  Pulse: 86 (01/10/25 1315)  Resp: 17 (01/10/25 0850)  BP: (!) 162/70 (01/10/25 1315)  SpO2: 95 % (01/10/25 0756)     24 Hour VS Range    Temp:  [97.8 °F (36.6 °C)-98.2 °F (36.8 °C)]   Pulse:  [66-88]   Resp:  [17-18]   BP: (107-181)/()   SpO2:  [95 %-100 %]     Intake/Output Summary (Last 24 hours) at 1/10/2025 1357  Last data filed at 1/10/2025 1349  Gross per 24 hour   Intake 1670 ml   Output 5450 ml   Net -3780 ml         I/O This Shift:  I/O this shift:  In: 1420 [P.O.:720; Other:700]  Out: 2400 [Urine:200; Other:2200]    Wt Readings from Last 3 Encounters:   01/01/25 97.1 kg (214 lb)   09/19/24 103.7 kg (228 lb 9.9 oz)   06/13/24 103.7 kg (228 lb 9.9 oz)       I have personally reviewed the vitals and recorded Intake/Output     Laboratory/Diagnostic Data:    CBC/Anemia Labs: Coags:    Recent Labs   Lab 01/08/25  0232 01/09/25  0343 01/10/25  0230   WBC 7.94 7.29 8.91   HGB 9.5* 8.8* 9.0*   HCT 29.9* 27.4*  "28.4*    240 253   MCV 93 92 92   RDW 12.8 13.0 12.9    Recent Labs   Lab 01/09/25  0343   INR 1.1        Chemistries: ABG:   Recent Labs   Lab 01/08/25  0232 01/09/25  0343 01/10/25  0230   * 123* 125*   K 4.1 4.6 4.1   CL 93* 93* 92*   CO2 19* 21* 23   BUN 29* 34* 23   CREATININE 2.6* 3.0* 2.1*   CALCIUM 8.2* 8.1* 8.1*   PROT 6.4 5.5* 6.1   BILITOT 0.3 0.3 0.3   ALKPHOS 85 78 92   ALT 14 13 10   AST 21 10 12   MG 1.6 1.6 1.6   PHOS 4.5 5.0* 3.6    No results for input(s): "PH", "PCO2", "PO2", "HCO3", "POCSATURATED", "BE" in the last 168 hours.     POCT Glucose: HbA1c:    Recent Labs   Lab 01/08/25  1914 01/09/25  0740 01/09/25  1620 01/09/25  2018 01/10/25  0758 01/10/25  1247   POCTGLUCOSE 239* 184* 139* 298* 150* 129*    Hemoglobin A1C   Date Value Ref Range Status   12/31/2024 5.9 (H) 4.0 - 5.6 % Final     Comment:     ADA Screening Guidelines:  5.7-6.4%  Consistent with prediabetes  >or=6.5%  Consistent with diabetes    High levels of fetal hemoglobin interfere with the HbA1C  assay. Heterozygous hemoglobin variants (HbS, HgC, etc)do  not significantly interfere with this assay.   However, presence of multiple variants may affect accuracy.     04/22/2024 6.2 (H) 4.8 - 5.9 % Final   10/06/2023 5.0 4.8 - 5.9 % Final        Cardiac Enzymes: Ejection Fractions:    No results for input(s): "CPK", "CPKMB", "MB", "TROPONINI" in the last 72 hours. EF   Date Value Ref Range Status   08/07/2023 60 % Final   11/29/2021 60 % Final          No results for input(s): "COLORU", "APPEARANCEUA", "PHUR", "SPECGRAV", "PROTEINUA", "GLUCUA", "KETONESU", "BILIRUBINUA", "OCCULTUA", "NITRITE", "UROBILINOGEN", "LEUKOCYTESUR", "RBCUA", "WBCUA", "BACTERIA", "SQUAMEPITHEL", "HYALINECASTS" in the last 48 hours.    Invalid input(s): "WRIGHTSUR"    Procalcitonin (ng/mL)   Date Value   12/31/2024 1.41 (H)   02/11/2022 0.12   12/06/2021 0.69 (H)     Lactate (Lactic Acid) (mmol/L)   Date Value   12/31/2024 0.8   08/17/2023 0.6 " "  08/06/2023 0.8   02/11/2022 1.4   02/11/2022 1.8     BNP (pg/mL)   Date Value   08/17/2023 495 (H)   08/06/2023 390 (H)   01/21/2022 167 (H)   01/20/2022 190 (H)   11/28/2021 773 (H)     CRP (mg/L)   Date Value   06/06/2024 13.5 (H)   06/30/2010 6.4   06/11/2010 4.9     Sed Rate   Date Value   06/06/2024 44 mm/Hr (H)   03/12/2015 110 mm/Hr (H)   06/11/2010 48 mm/hr (H)   02/09/2004 35 mm/hr (H)     No results found for: "DDIMER"  Ferritin (ng/mL)   Date Value   12/04/2024 626 (H)   11/06/2024 333 (H)   10/04/2024 594 (H)   09/04/2024 546 (H)   08/12/2024 510 (H)   07/01/2024 525 (H)   06/07/2024 554 (H)   05/01/2024 697 (H)   04/05/2024 483 (H)   03/08/2024 357 (H)     LD (U/L)   Date Value   03/12/2015 150     Troponin I (ng/mL)   Date Value   08/07/2023 0.066 (H)   08/06/2023 0.066 (H)   08/06/2023 0.063 (H)   01/21/2022 0.018   01/20/2022 0.014   11/29/2021 0.155 (H)   11/28/2021 0.181 (H)   11/28/2021 0.201 (H)     CPK (U/L)   Date Value   01/02/2025 62   11/14/2023 56   11/29/2021 789 (H)   09/28/2021 32   09/05/2021 43   02/09/2004 82     Results for orders placed or performed in visit on 01/05/24   Vitamin D    Collection Time: 01/05/24 12:00 AM   Result Value Ref Range    Vit D, 25-Hydroxy 44.9 30.0 - 100.0 ng/mL   Results for orders placed or performed during the hospital encounter of 08/06/23   Vitamin D    Collection Time: 08/09/23 12:25 PM   Result Value Ref Range    Vit D, 25-Hydroxy 15 (L) 30 - 96 ng/mL     *Note: Due to a large number of results and/or encounters for the requested time period, some results have not been displayed. A complete set of results can be found in Results Review.     SARS-CoV2 (COVID-19) Qualitative PCR (no units)   Date Value   12/31/2024 Not Detected   02/15/2022 Not Detected   12/28/2021 Not Detected   12/21/2021 Not Detected   09/05/2021 Not Detected     POC Rapid COVID (no units)   Date Value   02/11/2022 Negative   01/21/2022 Negative   01/20/2022 Negative   11/28/2021 " Negative   09/28/2021 Negative   09/05/2021 Negative   05/14/2021 Negative       Microbiology labs for the last week  Microbiology Results (last 7 days)       Procedure Component Value Units Date/Time    Blood culture [[id number]] Collected: 01/02/25 0529    Order Status: Completed Specimen: Blood from Peripheral, Hand, Right Updated: 01/07/25 0812     Blood Culture, Routine No growth after 5 days.    Blood culture [[id number]] Collected: 01/02/25 0526    Order Status: Completed Specimen: Blood from Peripheral, Antecubital, Right Updated: 01/07/25 0812     Blood Culture, Routine No growth after 5 days.    Urine culture [[id number]]  (Abnormal)  (Susceptibility) Collected: 12/31/24 1049    Order Status: Completed Specimen: Urine Updated: 01/05/25 1445     Urine Culture, Routine KLEBSIELLA PNEUMONIAE ESBL  50,000 - 99,999 cfu/ml        PROTEUS MIRABILIS  10,000 - 49,999 cfu/ml      Narrative:      Specimen Source->Urine    Blood culture x two cultures. Draw prior to antibiotics. [[id number]]  (Abnormal) Collected: 12/31/24 0937    Order Status: Completed Specimen: Blood from Peripheral, Antecubital, Right Updated: 01/03/25 1431     Blood Culture, Routine Gram stain aer bottle: Gram positive cocci in chains resembling Strep      Gram stain zelda bottle: Gram positive cocci in chains resembling Strep      Results called to and read back by: Sean Hernandez RN  01/01/2025  10:31      ENTEROCOCCUS FAECALIS  For susceptibility see order #[id number]      Narrative:      Aerobic and anaerobic    Blood culture x two cultures. Draw prior to antibiotics. [[id number]]  (Abnormal)  (Susceptibility) Collected: 12/31/24 0937    Order Status: Completed Specimen: Blood from Peripheral, Antecubital, Right Updated: 01/03/25 1429     Blood Culture, Routine Gram stain aer bottle: Gram positive cocci in chains resembling Strep      Gram stain zelda bottle: Gram positive cocci in chains resembling Strep      Results called to and read back by:  Sean Hernandez RN  01/01/2025  10:31      ENTEROCOCCUS FAECALIS    Narrative:      Aerobic and anaerobic            Reviewed and noted in plan where applicable- Please see chart for full lab data.    Lines/Drains:       Peripheral IV - Single Lumen 01/03/25 1708 20 G 1 3/4 in No Anterior;Right Upper Arm (Active)   Site Assessment Clean;Dry;Intact 01/08/25 2123   Line Securement Device Secured with sutureless device 01/08/25 2123   Extremity Assessment Distal to IV No abnormal discoloration;No redness;No swelling;No warmth 01/08/25 2123   Line Status Saline locked 01/08/25 2123   Dressing Status Clean;Intact;Dry 01/08/25 2123   Dressing Intervention Integrity maintained 01/08/25 2123   Dressing Change Due 01/07/25 01/08/25 2123   Site Change Due 01/07/25 01/08/25 2123   Reason Not Rotated Poor venous access 01/08/25 2123   Number of days: 5            Hemodialysis AV Fistula 02/14/24 1034 Left forearm (Active)   Needle Size 15ga 01/06/25 1447   Site Assessment Clean;Dry;Intact;No redness;No swelling 01/07/25 0912   Patency Present;Thrill 01/07/25 2030   Status Deaccessed 01/07/25 0912   Flows Good 01/06/25 1803   Dressing Intervention Integrity maintained 01/07/25 0912   Dressing Status Clean;Dry;Intact 01/07/25 0912   Site Condition No complications 01/06/25 1803   Number of days: 329            Suprapubic Catheter 12/31/24 1050 16 Fr. (Active)   Clamp Status unclamped 01/08/25 2123   Dressing no dressing 01/08/25 2123   Drainage yellow drainage 01/06/25 1022   Urine Color Yellow 01/08/25 2123   Collection Container Standard drainage bag 01/08/25 2123   Securement secured to abdomen w/ adhesive device 01/08/25 2123   Output (mL) 550 mL 01/08/25 2056   Number of days: 8       Imaging  ECG Results              EKG 12-lead (Final result)        Collection Time Result Time QRS Duration OHS QTC Calculation    12/31/24 09:33:53 12/31/24 14:17:21 78 432                     Final result by Interface, Lab In [facility]  (12/31/24 14:17:28)                   Narrative:    Test Reason : Z13.6,    Vent. Rate : 103 BPM     Atrial Rate : 103 BPM     P-R Int : 188 ms          QRS Dur :  78 ms      QT Int : 330 ms       P-R-T Axes :   8 -18  13 degrees    QTcB Int : 432 ms    Sinus tachycardia  Voltage criteria for left ventricular hypertrophy  Possible Lateral infarct ,age undetermined  Inferior infarct ,age undetermined  Abnormal ECG  When compared with ECG of 12-Jun-2024 15:18,  Nonspecific ST abnormality present in inferior leads    Confirmed by Cesar Sotelo ([id number]) on 12/31/2024 2:17:16 PM    Referred By: AAAREFERRAL SELF           Confirmed By: Cesar Sotelo                                    Results for orders placed during the hospital encounter of 12/31/24    Echo    Interpretation Summary    Left Ventricle: The left ventricle is normal in size. Normal wall thickness. Normal wall motion. There is normal systolic function with a visually estimated ejection fraction of 60 - 65%. Grade II diastolic dysfunction.    Right Ventricle: Normal right ventricular cavity size. Wall thickness is normal. Systolic function is normal.    Left Atrium: Left atrium is moderately dilated.    Aortic Valve: The aortic valve is a trileaflet valve. There is moderate aortic valve sclerosis. Mildly restricted motion. There is mild stenosis. Aortic valve area by VTI is 1.8 cm². Aortic valve peak velocity is 2.0 m/s. Mean gradient is 9.6 mmHg. The dimensionless index is 0.58.    Pulmonary Artery: The estimated pulmonary artery systolic pressure is 26 mmHg.    IVC/SVC: IVC was not well visualized due to poor acoustic window.      IR Tunneled Catheter Insert w/o Port  Narrative: EXAMINATION:  Tunneled central venous catheter placement    Procedural Personnel    Attending physician(s): Froy Garza    Fellow physician(s): None    Resident physician(s): None    Advanced practice provider(s): None    Pre-procedure diagnosis: Renal failure    Post-procedure  diagnosis: Same    Indication: Performance of hemodialysis    Additional clinical history: None    Complications: No immediate complications.    TECHNIQUE:  - Venous access with ultrasound guidance    - Tunneled central venous catheter insertion with fluoroscopic guidance    FINDINGS:  Pre-procedure    History and imaging of central venous access reviewed (QCDR): Yes    Consent: Informed consent for the procedure was obtained and time-out was performed prior to the procedure.    Prophylactic antibiotic administered: None    Preparation (MIPS): The site was prepared and draped using all elements of maximal sterile barrier technique including sterile gloves, sterile gown, cap, mask, large sterile sheet, sterile ultrasound probe cover, hand hygiene and cutaneous antisepsis with 2% chlorhexidine.    Medical reason for site preparation exception (MIPS): Not applicable    Anesthesia/sedation    The IR procedural team has confirmed the patient ID and re-evaluated the patient and sedation plan confirming it is suitable for the patient's condition and procedure.    Level of anesthesia/sedation: Moderate sedation (conscious sedation)    Anesthesia/sedation administered by: Nurse or other independent trained observer    Total intra-service sedation time (minutes): 15    Access    Local anesthesia was administered. The vessel was sonographically evaluated and determined to be patent. Real time ultrasound was used to visualize needle entry into the vessel and a permanent image was stored.    Vein accessed: Internal jugular vein    Access technique: Micropuncture set with 21 gauge needle    Venography    Indication for venography: Confirmation of position or mapping of vascular anatomy    Catheter tip position for venography: SVC    Venous segment imaged: SVC    Venogram demonstrates appropriate antegrade flow into the right atrium    Catheter placement    An incision was made near the venous access site and the catheter was  tunneled subcutaneously to the venous access site . The catheter was advanced via a peel-away sheath into the vein under fluoroscopic guidance. Catheter tip location was fluoroscopically verified and a permanent image was stored.    Catheter placed: Bard Glidepath    Catheter size (Yi): 14.5    Catheter cuff-to-tip or trimmed length (cm): 19    Catheter tip position: 2.5 VBUs below kaity.    Unique Device Identifier (TIFF): Not available    Catheter flush: Heparin (1000 units/mL)    Closure    A sterile dressing was applied.    Access site closure technique: Tissue adhesive    Catheter securement technique: Non-absorbable suture    Contrast    Contrast agent: None    Contrast volume (mL): 0    Radiation Dose    Fluoroscopy time ( minutes): 1.6    Reference air kerma ( mGy): 15.3    Kerma area product ( uGy-m2): 475.02    Additional Details    Additional description of procedure: None    Equipment details: None    Specimens removed: None    Estimated blood loss (mL): Less than 10    Standardized report: [email or web address]    Attestation    Signer name: Froy Garza    I attest that I was present for the entire procedure. I reviewed the stored images and agree with the report as written.  Impression: Insertion of right-sided supradiaphragmatic dual-lumen tunneled dialysis catheter, with tip in the expected location of the cavoatrial junction.    Plan:    The catheter may be used immediately.    _______________________________________________________________    Electronically signed by: Froy Garza  Date:    01/09/2025  Time:    17:36      Labs, Imaging, EKG and Diagnostic results from 1/10/2025 were reviewed.    Medications:  Medication list was reviewed and changes noted under Assessment/Plan.  Current Facility-Administered Medications on File Prior to Encounter   Medication Dose Route Frequency Provider Last Rate Last Admin    epoetin chloe injection 2,000 Units  2,000 Units Intravenous 1 time in Clinic/HOD  "Emmanuel Hare Jr., MD        heparin (porcine) injection 2,000 Units  2,000 Units Intravenous Once Emmanuel Hare Jr., MD        heparin (porcine) injection 2,000 Units  2,000 Units Intravenous Once Emmanuel Hare Jr., MD        heparin (porcine) injection 4,000 Units  4,000 Units Intra-Catheter 1 time in Clinic/HOD Emmanuel Hare Jr., MD        iron sucrose injection 100 mg  100 mg Intravenous 1 time in Clinic/HOD Emmanuel Hare Jr., MD         Current Outpatient Medications on File Prior to Encounter   Medication Sig Dispense Refill    ELIQUIS 5 mg Tab TAKE 1 TABLET BY MOUTH TWICE A  tablet 3    acetaminophen (TYLENOL) 325 MG tablet Take 2 tablets (650 mg total) by mouth every 6 (six) hours as needed for Pain.      amLODIPine (NORVASC) 10 MG tablet Take 10 mg by mouth.      anastrozole (ARIMIDEX) 1 mg Tab TAKE 1 TABLET BY MOUTH EVERY DAY 90 tablet 2    BD INSULIN SYRINGE ULTRA-FINE 0.5 mL 31 gauge x 5/16" Syrg USE WITH INSULIN 4 TIMES A  each 12    calcium acetate,phosphat bind, (PHOSLO) 667 mg capsule Take 667 mg by mouth 3 (three) times daily with meals.      carvediloL (COREG) 12.5 MG tablet Take 1 tablet (12.5 mg total) by mouth 2 (two) times daily. 180 tablet 3    diclofenac sodium (VOLTAREN) 1 % Gel Apply 2 g topically once daily.      DULoxetine (CYMBALTA) 20 MG capsule Take 2 capsules (40 mg total) by mouth once daily. 180 capsule 1    erenumab-aooe (AIMOVIG AUTOINJECTOR) 140 mg/mL autoinjector 140 mg MONTHLY (route: subcutaneous) 1 each 11    ergocalciferol (ERGOCALCIFEROL) 50,000 unit Cap Take 1 capsule (50,000 Units total) by mouth every 7 days. 12 capsule 3    furosemide (LASIX) 40 MG tablet Take 1 tablet (40 mg total) by mouth once daily. 30 tablet 11    HYDROcodone-acetaminophen (NORCO)  mg per tablet Take 1 tablet by mouth every 6 (six) hours as needed for Pain. 120 tablet 0    ipratropium (ATROVENT) 21 mcg (0.03 %) nasal spray 2 sprays by Each " "Nostril route 2 (two) times daily. 30 mL 0    lactulose (CHRONULAC) 20 gram/30 mL Soln Take 30 mLs (20 g total) by mouth every 12 (twelve) hours as needed (for constipation). 900 mL 0    LANTUS SOLOSTAR U-100 INSULIN 100 unit/mL (3 mL) InPn pen INJECT 12-15 UNITS UNDER THE SKIN at night 15 mL 3    losartan (COZAAR) 100 MG tablet Take 1 tablet (100 mg total) by mouth once daily. 90 tablet 3    methocarbamoL (ROBAXIN) 750 MG Tab TAKE 1 TO 2 TABLETS(750 TO 1500 MG) BY MOUTH THREE TIMES DAILY AS NEEDED FOR MUSCLE SPASMS 180 tablet 1    ondansetron (ZOFRAN-ODT) 8 MG TbDL 8 mg EVERY 12 HOURS (route: oral)      oxybutynin (DITROPAN-XL) 10 MG 24 hr tablet TAKE 1 TABLET BY MOUTH EVERY DAY 90 tablet 4    oxyCODONE (ROXICODONE) 5 MG immediate release tablet Take 1 tablet (5 mg total) by mouth daily as needed (severe pain). 30 tablet 0    patiromer calcium sorbitex (VELTASSA) 16.8 gram PwPk Take 1 packet (16.8 g total) by mouth once daily. for 90 doses 30 packet 2    pen needle, diabetic (BD ULTRA-FINE SHORT PEN NEEDLE) 31 gauge x 5/16" Ndle USE WITH LANTUS DAILY, e 11.65 100 each 3    silver sulfADIAZINE 1% (SILVADENE) 1 % cream Apply topically Every 3 (three) days. 20 g 0    sodium bicarbonate 650 MG tablet Take 1 tablet (650 mg total) by mouth once daily. 30 tablet 11    sumatriptan (IMITREX) 100 MG tablet Take 1 tablet (100 mg total) by mouth every 2 (two) hours as needed for Migraine (Limit 2 in 14 hours and 9 in one month). 30 tablet 1    SYNTHROID 50 mcg tablet TAKE 1 TABLET BY MOUTH BEFORE BREAKFAST. ADMINISTER ON AN EMPTY STOMACH AT LEAST 30 MINUTES BEFORE FOOD. IF RECEIVING TUBE FEEDS, HOLD TUBE FEEDS FOR 1 HOUR BEFORE AND AFTER LEVOTHYROXINE ADMINISTRATION. 90 tablet 2    traZODone (DESYREL) 100 MG tablet TAKE 1 TABLET BY MOUTH NIGHTLY AS NEEDED FOR INSOMNIA. 90 tablet 2    UNABLE TO FIND medication name: Tylenol Migraine- APAP, ASA, caffeine      VELPHORO 500 mg Chew CHEW INSERT TABLET 5 TIMES DAILY WITH MEAL AND " SNACK      [DISCONTINUED] atorvastatin (LIPITOR) 80 MG tablet Take 1 tablet (80 mg total) by mouth once daily. 90 tablet 3     Scheduled Medications:  Current Facility-Administered Medications   Medication Dose Route Frequency    amLODIPine  10 mg Oral Daily    anastrozole  1 mg Oral Daily    apixaban  5 mg Oral BID    atorvastatin  80 mg Oral Daily    carvediloL  12.5 mg Oral BID    DULoxetine  40 mg Oral Daily    epoetin chloe-ebpx (RETACRIT) injection  50 Units/kg Intravenous Every Mon, Wed, Fri    ipratropium  2 spray Each Nostril BID    levothyroxine  50 mcg Oral Before breakfast    losartan  100 mg Oral Daily    oxybutynin  10 mg Oral Daily    sevelamer carbonate  800 mg Oral TID WM    sodium bicarbonate  650 mg Oral Daily    vancomycin (VANCOCIN) IV (PEDS and ADULTS)  750 mg Intravenous Once     PRN:   Current Facility-Administered Medications:     acetaminophen, 650 mg, Oral, Q4H PRN    dextrose 50%, 12.5 g, Intravenous, PRN    dextrose 50%, 12.5 g, Intravenous, PRN    dextrose 50%, 25 g, Intravenous, PRN    dextrose 50%, 25 g, Intravenous, PRN    glucagon (human recombinant), 1 mg, Intramuscular, PRN    glucagon (human recombinant), 1 mg, Intramuscular, PRN    glucose, 16 g, Oral, PRN    glucose, 16 g, Oral, PRN    glucose, 24 g, Oral, PRN    glucose, 24 g, Oral, PRN    heparin (porcine), 1,000 Units, Intra-Catheter, PRN    HYDROcodone-acetaminophen, 1 tablet, Oral, Q4H PRN    insulin aspart U-100, 1-10 Units, Subcutaneous, QID (AC + HS) PRN    methocarbamoL, 750 mg, Oral, TID PRN    naloxone, 0.02 mg, Intravenous, PRN    ondansetron, 8 mg, Intravenous, Q6H PRN    polyethylene glycol, 17 g, Oral, Daily PRN    sodium chloride 0.9%, 5 mL, Intravenous, PRN    traZODone, 100 mg, Oral, Nightly PRN    Pharmacy to dose Vancomycin consult, , , Once **AND** vancomycin - pharmacy to dose, , Intravenous, pharmacy to manage frequency  Infusions:   Estimated Creatinine Clearance: 29.5 mL/min (A) (based on SCr of 2.1  mg/dL (H)).           Assessment and Plan     * Severe sepsis  Pt admitted for severe sepsis 2/2 CAUTI and Enterococcus bacteremia. On arrival febrile to 101.7F, tachycardic 102 with WBC 21 and slow to respond (encephalopathy). Suprapubic catheter changed in the ED. WBC down to 12, Procal 1.4. Urine cx with ESBL Klebsiella and Proteus. Blood cx 12.31 with Enterococcus faecalis. Blood cx 1.2 NGTD.     - ID consulted, appreciate recommendations:  -- 1 time dose of gentamicin  -- Continue vanc for Enterococcus faecalis bacteremia  -- Of note, does have a tunneled line which is likely the culprit of infection however there are issues with her AVF     s/p line removal by IR today for 24-48 hr line holiday;  Plan for TDC placement by IR in am NPO after MN    1/9 Severe sepsis 2/2 Enterococcus bacteremia and suprapubic UTI.   ID was consulted and she was started on Dapto for Enterococcus. Switched to Vancomycin .  Had issues with AVF for HD during stay. Vascular surgery was consulted s/p fistulogram 1/6 with good flow. RN unable to cannulate AVF with arterial and venous needle despite fistulogram.   s/p line removal by IR 01/07 for 24-48 hr line holiday; Plan for 8 weeks of Vanc with HD from discharge per ID recs    HTN (hypertension)  Patient's blood pressure range in the last 24 hours was: BP  Min: 122/67  Max: 188/71.The patient's inpatient anti-hypertensive regimen is listed below:  Current Antihypertensives  losartan tablet 100 mg, Daily, Oral  carvediloL tablet 12.5 mg, 2 times daily, Oral  amLODIPine tablet 10 mg, Daily, Oral    Plan  - BP increasing since holding BP meds for sepsis  - continue Coreg and Losartan.  - restarting home norvasc    Positive JASON (antinuclear antibody)  History noted      Personal history of malignant neoplasm of breast  History noted  Continue Arimidex      ESRD (end stage renal disease) on dialysis  Creatine stable for now. BMP reviewed- noted Estimated Creatinine Clearance: 20.7 mL/min  (A) (based on SCr of 3 mg/dL (H)). according to latest data. Based on current GFR, CKD stage is end stage.  Monitor UOP and serial BMP and adjust therapy as needed. Renally dose meds. Avoid nephrotoxic medications and procedures.    Nephro consult  Has been getting HD MF via right IJ PermCath  Needs f/u with Vascular Surgery for IJ removal  Vascular Surgery consulted for problem cannulating HD access   s/p fistulogram 1/6 with good flow.RN unable to cannulate AVF with arterial and venous needle despite fistulogram    s/p line removal by IR today for 24-48 hr line holiday   Plan for TDC placement by IR in am NPO after MN    Pressure injury of right buttock, stage 3  Wound Care      Anemia in ESRD (end-stage renal disease)  Anemia is likely due to chronic disease due to ESRD. Most recent hemoglobin and hematocrit are listed below.  Recent Labs     01/08/25  0232 01/09/25  0343 01/10/25  0230   HGB 9.5* 8.8* 9.0*   HCT 29.9* 27.4* 28.4*     Plan  - Monitor serial CBC: Daily  - Transfuse PRBC if patient becomes hemodynamically unstable, symptomatic or H/H drops below 7/21.  - Patient has not received any PRBC transfusions to date      Unspecified atrial fibrillation  Patient has paroxysmal (<7 days) atrial fibrillation. Patient is currently in sinus rhythm. [id number] Score: 3. The patients heart rate in the last 24 hours is as follows:  Pulse  Min: 65  Max: 76     Antiarrhythmics       Anticoagulants  heparin (porcine) injection 1,000 Units, As needed (PRN), Intra-Catheter  apixaban tablet 5 mg, 2 times daily, Oral    Plan  - Replete lytes with a goal of K>4, Mg >2  - Patient is anticoagulated, see medications listed above.  - Patient's afib is currently controlled  - Previously had afib, and on Eliquis once a day because of bruising?  As she is being monitored will put on appropriate BID dosing    Bacteremia due to Enterococcus  See severe sepsis      Catheter-associated urinary tract infection  See severe sepsis  1/9  Severe sepsis 2/2 Enterococcus bacteremia and suprapubic UTI.   ID was consulted and she was started on Dapto for Enterococcus. Switched to Vancomycin .  Had issues with AVF for HD during stay. Vascular surgery was consulted s/p fistulogram 1/6 with good flow. RN unable to cannulate AVF with arterial and venous needle despite fistulogram.   s/p line removal by IR 01/07 for 24-48 hr line holiday; Plan for 8 weeks of Vanc with HD from discharge per ID recs. Vancomycin 500 mg IV after HD. Re-dose when the random level is less than 20    Type 2 diabetes mellitus treated with insulin  HbA1c 6.2, sugars controlled  Home DM regimen:  Largine 15 qHS  Hold scheduled insulin for now with AMS and decreased oral intake  SSI with POCT accuchecks to achieve blood glucose of 140-180 while hospitalized  Hypoglycemia protocol  Diabetic diet        Prophylactic use of anastrozole (Arimidex)  On Arimidex for history of breast cancer      Obesity, diabetes, and hypertension syndrome  Noted    Chronic pain  2/2 lumbar spondylosis  See above      Lumbar spondylosis  Chronic issue  Continue Cymbalta  Continue Robaxin TID PRN  Continue Norco prn      Class 1 obesity due to excess calories with serious comorbidity in adult  Body mass index is 32.54 kg/m². Morbid obesity complicates all aspects of disease management from diagnostic modalities to treatment. Weight loss encouraged and health benefits explained to patient.         Major depressive disorder, recurrent, moderate  Patient has recurrent depression which is moderate and is currently controlled. Will Continue anti-depressant medications. We will not consult psychiatry at this time. Patient does not display psychosis at this time. Continue to monitor closely and adjust plan of care as needed.      Chronic and stable  Continue Cymbalta      Neurogenic bladder  Chronic issue  Has suprapubic cath, replaced 12/31  Continue Oxybutynin      Hyponatremia  Hyponatremia is likely due to volume  overload from ESRD. The patient's most recent sodium results are listed below.  Recent Labs     01/08/25  0232 01/09/25  0343 01/10/25  0230   * 123* 125*     Plan  - Correct the sodium by 4-6mEq in 24 hours.   - Will treat the hyponatremia with HD  - Monitor sodium Daily.   - Patient hyponatremia is stable    1/9sodium trended down to 123. . sodium trended down to 123. Fluid restriction 1L/24h . UF with goal 2L today after TDC placement.   1/10 sodium improved to 125.     Hyperlipidemia  Chronic and stable  Continue Lipitor    Hypothyroidism  Chronic and stable  Continue Synthroid  TSH 1.631        VTE Risk Mitigation (From admission, onward)           Ordered     apixaban tablet 5 mg  2 times daily         01/09/25 0747     heparin (porcine) injection 1,000 Units  As needed (PRN)         01/01/25 1610                    Discharge Planning   FLORENTINO: 1/11/2025     Code Status: Full Code   Medical Readiness for Discharge Date:   Discharge Plan A: Home Health                        Milton Julian MD  Department of Hospital Medicine   [facility] - Internal Medicine Telemetry

## 2025-01-10 NOTE — DISCHARGE SUMMARY
Petros Shaffer - Internal Medicine [facility]  Discharge Summary      Patient Name: Cecile Bowen  MRN: [id number]  [facility]: [id number]  Patient Class: IP- Inpatient  Admission Date: 12/31/2024  Hospital Length of Stay: 11 days  Discharge Date and Time:  01/11/2025 6:27 PM  Attending Physician: Milton Julian MD   Discharging Provider: Milton Julian MD  Primary Care Provider: Lurdes Navarro MD  Hospital Medicine Team: [facility] HOSP MED A Milton Julian MD  Primary Care Team: [facility] MED A    HPI:   Ms. Cecile Bowen is a 74 y.o. female with ESRD on HD, T2DM, afib on Eliquis, HTN, obesity, history of breast cancer, chronic suprapubic catheter for neurogenic bladder and chronic back pain who presented to the [facility] ED 12/31 from Vascular Surgery for evaluation of fever and AMS.  History is obtained from patient.  She reports that over the last few days, she has been having fever and lethargy.  She went to see her Vascular Surgeon for right IJ PermCath removal, as her fistula has matured.  She is currently dialyzing only Mondays and Fridays via right IJ PermCath.  However, upon arrival to clinic she was febrile to 100.9F, slow to respond and tachy to 104.  She was sent to the ER for further evaluation.  At baseline, she uses a wheelchair.  She lives with her sister.  She currently endorses back pain that is not escalated from normal.  She is full code.  She took all of her medications prior to going to clinic except her Eliquis.    Upon arrival to the ER, vitals were temp 101.7F, , /77.  Labs showed WBC 20, Hgb 11.3, Sodium 129, BUN/Cr 28/3.2, Lactic 0.8.  UA was dirty.  Suprapubic catheter was exchanged in the ED.  CXR was negative.  She was given Vanc and Zosyn was admitted to Hospital Medicine for further management.    Procedure(s) (LRB):  Fistulogram (Left)      Hospital Course:   Ms. Bowen is a 74 y.o. female with ESRD on HD, T2DM, afib on Eliquis, HTN, obesity, history of  breast cancer, chronic suprapubic catheter for neurogenic bladder and chronic back pain who was admitted for severe sepsis 2/2 Enterococcus bacteremia and suprapubic UTI.  She was started on Zosyn.  After her blood cx returned positive, ID was consulted and she was started on Dapto for Enterococcus. Switched to Vanc . Plan for 8 weeks of Vanc with HD from discharge Urine cx returned with ESBL Klebsiella and Proteus, unclear if this is a UTI or just colonization. S/p one dose of gentamicin per ID Had issues with AVF for HD during stay. Vascular surgery was consulted s/p fistulogram 1/6 with good flow. RN unable to cannulate AVF with arterial and venous needle despite fistulogram  s/p line removal by IR today for 24-48 hr line holiday; Plan for TDC placement by IR in am NPO after MN will need OP follow up with Vascular    1/9 Severe sepsis 2/2 Enterococcus bacteremia and suprapubic UTI.   ID was consulted and she was started on Dapto for Enterococcus. Switched to Vancomycin .  Had issues with AVF for HD during stay. Vascular surgery was consulted s/p fistulogram 1/6 with good flow. RN unable to cannulate AVF with arterial and venous needle despite fistulogram.   s/p line removal by IR 01/07 for 24-48 hr line holiday; Plan for 8 weeks of Vanc with HD from discharge per ID recs.   Vancomycin 500 mg IV after HD. Re-dose when the random level is less than 20. s/p DC placement by IR today. HD today after TDC placement. sodium trended down to 123. Fluid restriction 1L/24h . UF with goal 2L today. needs HD today after TDC placement. hold eliquis today for TDC.  needs OP follow up with Vascular surgery   1/10 sodium 125 .HD today   1/11 sats 98% on RA. sodium improved from  125 to 127.  Nephrology follow up - started lasix 80mg PO QDaily. Follow up with outpatient HD on 1/12. Discharge home        Goals of Care Treatment Preferences:  Code Status: Full Code    Health care agent: Pooja Sánchez (sister)  Health care agent number:  "[phone]                   [facility] Screening:  The patient was screened for utility difficulties, food insecurity, transport difficulties, housing insecurity, and interpersonal safety and there were no concerns identified this admission.     Consults:   Consults (From admission, onward)          Status Ordering Provider     Inpatient consult to PICC team ([facility])  Once        Provider:  (Not yet assigned)    Completed CHAD ALVARADO     Inpatient consult to Interventional Radiology  Once        Provider:  (Not yet assigned)    Completed CHARLEEN HILLS     Inpatient consult to Interventional Radiology  Once        Provider:  (Not yet assigned)    Completed CHARLEEN HILLS     Pharmacy to dose Vancomycin consult  Once        Provider:  (Not yet assigned)   Placed in "And" Linked Group    Acknowledged VESNA PARRISH     Inpatient consult to PICC team ([facility])  Once        Provider:  (Not yet assigned)    Completed MARGARITA BARLOW     Inpatient consult to Infectious Diseases  Once        Provider:  (Not yet assigned)    Completed MARGARITA BARLOW     Inpatient consult to Nephrology  Once        Provider:  (Not yet assigned)    Completed MARGARITA BARLOW            * Severe sepsis  Pt admitted for severe sepsis 2/2 CAUTI and Enterococcus bacteremia. On arrival febrile to 101.7F, tachycardic 102 with WBC 21 and slow to respond (encephalopathy). Suprapubic catheter changed in the ED. WBC down to 12, Procal 1.4. Urine cx with ESBL Klebsiella and Proteus. Blood cx 12.31 with Enterococcus faecalis. Blood cx 1.2 NGTD.     - ID consulted, appreciate recommendations:  -- 1 time dose of gentamicin  -- Continue vanc for Enterococcus faecalis bacteremia  -- Of note, does have a tunneled line which is likely the culprit of infection however there are issues with her AVF     s/p line removal by IR today for 24-48 hr line holiday;  Plan for TDC placement by IR in am NPO after MN    1/9 Severe sepsis 2/2 " Enterococcus bacteremia and suprapubic UTI.   ID was consulted and she was started on Dapto for Enterococcus. Switched to Vancomycin .  Had issues with AVF for HD during stay. Vascular surgery was consulted s/p fistulogram 1/6 with good flow. RN unable to cannulate AVF with arterial and venous needle despite fistulogram.   s/p line removal by IR 01/07 for 24-48 hr line holiday; Plan for 8 weeks of Vanc with HD from discharge per ID recs    HTN (hypertension)  Patient's blood pressure range in the last 24 hours was: BP  Min: 122/67  Max: 188/71.The patient's inpatient anti-hypertensive regimen is listed below:  Current Antihypertensives  losartan tablet 100 mg, Daily, Oral  carvediloL tablet 12.5 mg, 2 times daily, Oral  amLODIPine tablet 10 mg, Daily, Oral    Plan  - BP increasing since holding BP meds for sepsis  - continue Coreg and Losartan.  - restarting home norvasc    Positive JASON (antinuclear antibody)  History noted      Personal history of malignant neoplasm of breast  History noted  Continue Arimidex      ESRD (end stage renal disease) on dialysis  Creatine stable for now. BMP reviewed- noted Estimated Creatinine Clearance: 20.7 mL/min (A) (based on SCr of 3 mg/dL (H)). according to latest data. Based on current GFR, CKD stage is end stage.  Monitor UOP and serial BMP and adjust therapy as needed. Renally dose meds. Avoid nephrotoxic medications and procedures.    Nephro consult  Has been getting HD MF via right IJ PermCath  Needs f/u with Vascular Surgery for IJ removal  Vascular Surgery consulted for problem cannulating HD access   s/p fistulogram 1/6 with good flow.RN unable to cannulate AVF with arterial and venous needle despite fistulogram    s/p line removal by IR today for 24-48 hr line holiday   Plan for TDC placement by IR in am NPO after MN    Pressure injury of right buttock, stage 3  Wound Care      Anemia in ESRD (end-stage renal disease)  Anemia is likely due to chronic disease due to ESRD.  Most recent hemoglobin and hematocrit are listed below.  Recent Labs     01/09/25  0343 01/10/25  0230 01/11/25  0514   HGB 8.8* 9.0* 9.1*   HCT 27.4* 28.4* 28.0*     Plan  - Monitor serial CBC: Daily  - Transfuse PRBC if patient becomes hemodynamically unstable, symptomatic or H/H drops below 7/21.  - Patient has not received any PRBC transfusions to date      Unspecified atrial fibrillation  Patient has paroxysmal (<7 days) atrial fibrillation. Patient is currently in sinus rhythm. [id number] Score: 3. The patients heart rate in the last 24 hours is as follows:  Pulse  Min: 65  Max: 76     Antiarrhythmics       Anticoagulants  heparin (porcine) injection 1,000 Units, As needed (PRN), Intra-Catheter  apixaban tablet 5 mg, 2 times daily, Oral    Plan  - Replete lytes with a goal of K>4, Mg >2  - Patient is anticoagulated, see medications listed above.  - Patient's afib is currently controlled  - Previously had afib, and on Eliquis once a day because of bruising?  As she is being monitored will put on appropriate BID dosing    Bacteremia due to Enterococcus  See severe sepsis      Catheter-associated urinary tract infection  See severe sepsis  1/9 Severe sepsis 2/2 Enterococcus bacteremia and suprapubic UTI.   ID was consulted and she was started on Dapto for Enterococcus. Switched to Vancomycin .  Had issues with AVF for HD during stay. Vascular surgery was consulted s/p fistulogram 1/6 with good flow. RN unable to cannulate AVF with arterial and venous needle despite fistulogram.   s/p line removal by IR 01/07 for 24-48 hr line holiday; Plan for 8 weeks of Vanc with HD from discharge per ID recs. Vancomycin 500 mg IV after HD. Re-dose when the random level is less than 20    Type 2 diabetes mellitus treated with insulin  HbA1c 6.2, sugars controlled  Home DM regimen:  Largine 15 qHS  Hold scheduled insulin for now with AMS and decreased oral intake  SSI with POCT accuchecks to achieve blood glucose of 140-180 while  hospitalized  Hypoglycemia protocol  Diabetic diet        Prophylactic use of anastrozole (Arimidex)  On Arimidex for history of breast cancer      Obesity, diabetes, and hypertension syndrome  Noted    Chronic pain  2/2 lumbar spondylosis  See above      Lumbar spondylosis  Chronic issue  Continue Cymbalta  Continue Robaxin TID PRN  Continue Norco prn      Class 1 obesity due to excess calories with serious comorbidity in adult  Body mass index is 32.54 kg/m². Morbid obesity complicates all aspects of disease management from diagnostic modalities to treatment. Weight loss encouraged and health benefits explained to patient.         Major depressive disorder, recurrent, moderate  Patient has recurrent depression which is moderate and is currently controlled. Will Continue anti-depressant medications. We will not consult psychiatry at this time. Patient does not display psychosis at this time. Continue to monitor closely and adjust plan of care as needed.      Chronic and stable  Continue Cymbalta      Neurogenic bladder  Chronic issue  Has suprapubic cath, replaced 12/31  Continue Oxybutynin      Hyponatremia  Hyponatremia is likely due to volume overload from ESRD. The patient's most recent sodium results are listed below.  Recent Labs     01/09/25  0343 01/10/25  0230 01/11/25  0514   * 125* 127*     Plan  - Correct the sodium by 4-6mEq in 24 hours.   - Will treat the hyponatremia with HD  - Monitor sodium Daily.   - Patient hyponatremia is stable    1/9 sodium trended down to 123. . sodium trended down to 123. Fluid restriction 1L/24h . UF with goal 2L today after TDC placement.   1/10 sodium improved to 125.   1/11 sodium improved from  125 to 127.  Nephrology follow up - started lasix 80mg PO QDaily. Follow up with outpatient HD on 1/12.     Hyperlipidemia  Chronic and stable  Continue Lipitor    Hypothyroidism  Chronic and stable  Continue Synthroid  TSH 1.631        Final Active Diagnoses:    Diagnosis  Date Noted POA    PRINCIPAL PROBLEM:  Severe sepsis [A41.9, R65.20] 06/12/2024 Yes    HTN (hypertension) [I10] 06/12/2024 Yes    Positive JASON (antinuclear antibody) [R76.8] 01/03/2024 Yes    ESRD (end stage renal disease) on dialysis [N18.6, Z99.2] 02/11/2022 Not Applicable    Pressure injury of right buttock, stage 3 [L89.313] 01/26/2022 Yes    Anemia in ESRD (end-stage renal disease) [N18.6, D63.1] 01/25/2022 Yes    Unspecified atrial fibrillation [I48.91] 11/29/2021 Yes    Catheter-associated urinary tract infection [T83.511A, N39.0] 09/29/2021 Yes    Bacteremia due to Enterococcus [R78.81, B95.2] 09/28/2021 Yes    Personal history of malignant neoplasm of breast [Z85.3] 06/05/2021 Not Applicable    Type 2 diabetes mellitus treated with insulin [E11.9, Z79.4] 02/07/2020 Not Applicable    Prophylactic use of anastrozole (Arimidex) [Z79.811] 01/31/2020 Not Applicable    Obesity, diabetes, and hypertension syndrome [E11.69, E66.9, E11.59, I15.2] 09/25/2019 Yes    Chronic pain [G89.29] 03/15/2018 Yes     Chronic    Lumbar spondylosis [M47.816] 02/20/2018 Yes    Class 1 obesity due to excess calories with serious comorbidity in adult [E66.811, E66.09] 01/09/2017 Yes    Major depressive disorder, recurrent, moderate [F33.1] 03/10/2016 Yes    Neurogenic bladder [N31.9] 12/14/2015 Yes     Chronic    Hyponatremia [E87.1] 09/22/2014 Yes     Chronic    Hyperlipidemia [E78.5] 03/11/2014 Yes    Hypothyroidism [E03.9] 12/12/2012 Yes     Chronic      Problems Resolved During this Admission:    Diagnosis Date Noted Date Resolved POA    Toxic encephalopathy [G92.9] 12/31/2024 01/04/2025 Yes       Discharged Condition: fair    Disposition: Home-Health Care Svchome    Follow Up:    Patient Instructions:      Ambulatory referral/consult to Vascular Surgery   Standing Status: Future   Referral Priority: Routine Referral Type: Consultation   Referral Reason: Specialty Services Required   Requested Specialty: Vascular Surgery   Number  "of Visits Requested: 1       Significant Diagnostic Studies: Labs: BMP:   Recent Labs   Lab 01/10/25  0230 01/11/25  0514   * 130*   * 127*   K 4.1 4.2   CL 92* 100   CO2 23 19*   BUN 23 14   CREATININE 2.1* 1.7*   CALCIUM 8.1* 8.0*   MG 1.6 1.6   , CMP   Recent Labs   Lab 01/10/25  0230 01/11/25  0514   * 127*   K 4.1 4.2   CL 92* 100   CO2 23 19*   * 130*   BUN 23 14   CREATININE 2.1* 1.7*   CALCIUM 8.1* 8.0*   PROT 6.1 5.6*   ALBUMIN 2.6* 2.3*   BILITOT 0.3 0.3   ALKPHOS 92 82   AST 12 11   ALT 10 10   ANIONGAP 10 8   , CBC   Recent Labs   Lab 01/10/25  0230 01/11/25  0514   WBC 8.91 6.03   HGB 9.0* 9.1*   HCT 28.4* 28.0*    167   , INR   Lab Results   Component Value Date    INR 1.1 01/09/2025    INR 1.0 12/31/2024    INR 1.0 08/08/2023   , Lipid Panel   Lab Results   Component Value Date    CHOL 144 04/22/2024    HDL 36 04/22/2024    LDLCALC 85 04/22/2024    TRIG 117 04/22/2024    CHOLHDL 3.5 08/21/2023   , Troponin No results for input(s): "TROPONINI" in the last 168 hours., A1C:   Recent Labs   Lab 12/31/24  0937   HGBA1C 5.9*   , and All labs within the past 24 hours have been reviewed  Microbiology: Blood Culture   Lab Results   Component Value Date    LABBLOO No growth after 5 days. 01/02/2025   , Sputum Culture No results found for: "GSRESP", "RESPIRATORYC", and Urine Culture    Lab Results   Component Value Date    LABURIN (A) 12/31/2024     KLEBSIELLA PNEUMONIAE ESBL  50,000 - 99,999 cfu/ml      LABURIN PROTEUS MIRABILIS  10,000 - 49,999 cfu/ml   (A) 12/31/2024       IR Tunneled Catheter Insert w/o Port  Narrative: EXAMINATION:  Tunneled central venous catheter placement    Procedural Personnel    Attending physician(s): Froy Garza    Fellow physician(s): None    Resident physician(s): None    Advanced practice provider(s): None    Pre-procedure diagnosis: Renal failure    Post-procedure diagnosis: Same    Indication: Performance of hemodialysis    Additional clinical " history: None    Complications: No immediate complications.    TECHNIQUE:  - Venous access with ultrasound guidance    - Tunneled central venous catheter insertion with fluoroscopic guidance    FINDINGS:  Pre-procedure    History and imaging of central venous access reviewed (QCDR): Yes    Consent: Informed consent for the procedure was obtained and time-out was performed prior to the procedure.    Prophylactic antibiotic administered: None    Preparation (MIPS): The site was prepared and draped using all elements of maximal sterile barrier technique including sterile gloves, sterile gown, cap, mask, large sterile sheet, sterile ultrasound probe cover, hand hygiene and cutaneous antisepsis with 2% chlorhexidine.    Medical reason for site preparation exception (MIPS): Not applicable    Anesthesia/sedation    The IR procedural team has confirmed the patient ID and re-evaluated the patient and sedation plan confirming it is suitable for the patient's condition and procedure.    Level of anesthesia/sedation: Moderate sedation (conscious sedation)    Anesthesia/sedation administered by: Nurse or other independent trained observer    Total intra-service sedation time (minutes): 15    Access    Local anesthesia was administered. The vessel was sonographically evaluated and determined to be patent. Real time ultrasound was used to visualize needle entry into the vessel and a permanent image was stored.    Vein accessed: Internal jugular vein    Access technique: Micropuncture set with 21 gauge needle    Venography    Indication for venography: Confirmation of position or mapping of vascular anatomy    Catheter tip position for venography: SVC    Venous segment imaged: SVC    Venogram demonstrates appropriate antegrade flow into the right atrium    Catheter placement    An incision was made near the venous access site and the catheter was tunneled subcutaneously to the venous access site . The catheter was advanced via a  peel-away sheath into the vein under fluoroscopic guidance. Catheter tip location was fluoroscopically verified and a permanent image was stored.    Catheter placed: Bard Glidepath    Catheter size (Taiwanese): 14.5    Catheter cuff-to-tip or trimmed length (cm): 19    Catheter tip position: 2.5 VBUs below kaity.    Unique Device Identifier (TIFF): Not available    Catheter flush: Heparin (1000 units/mL)    Closure    A sterile dressing was applied.    Access site closure technique: Tissue adhesive    Catheter securement technique: Non-absorbable suture    Contrast    Contrast agent: None    Contrast volume (mL): 0    Radiation Dose    Fluoroscopy time ( minutes): 1.6    Reference air kerma ( mGy): 15.3    Kerma area product ( uGy-m2): 475.02    Additional Details    Additional description of procedure: None    Equipment details: None    Specimens removed: None    Estimated blood loss (mL): Less than 10    Standardized report: [email or web address]_TunneledCatheter_[email or web address]    Attestation    Signer name: Froy Garza    I attest that I was present for the entire procedure. I reviewed the stored images and agree with the report as written.  Impression: Insertion of right-sided supradiaphragmatic dual-lumen tunneled dialysis catheter, with tip in the expected location of the cavoatrial junction.    Plan:    The catheter may be used immediately.    _______________________________________________________________    Electronically signed by: Froy Garza  Date:    01/09/2025  Time:    17:36       Pending Diagnostic Studies:       None           Medications:  Reconciled Home Medications:      Medication List        START taking these medications      0.9% NaCl PgBk 100 mL with vancomycin 500 mg SolR  Vancomycin random levels before HD  Vancomycin 500 mg IV x1 for after HD on monday, wednesday and friday  Re-dose when the random level is less than 20 mcg/mL            CHANGE how you take these medications      amLODIPine 10 MG tablet  Commonly known  "as: NORVASC  Take 1 tablet (10 mg total) by mouth once daily.  Start taking on: January 12, 2025  What changed: when to take this     furosemide 80 MG tablet  Commonly known as: LASIX  Take 1 tablet (80 mg total) by mouth once daily.  What changed:   medication strength  how much to take            CONTINUE taking these medications      AIMOVIG AUTOINJECTOR 140 mg/mL autoinjector  Generic drug: erenumab-aooe  140 mg MONTHLY (route: subcutaneous)     anastrozole 1 mg Tab  Commonly known as: ARIMIDEX  TAKE 1 TABLET BY MOUTH EVERY DAY     BD INSULIN SYRINGE ULTRA-FINE 0.5 mL 31 gauge x 5/16" Syrg  Generic drug: insulin syringe-needle U-100  USE WITH INSULIN 4 TIMES A DAY     calcium acetate(phosphat bind) 667 mg capsule  Commonly known as: PHOSLO  Take 667 mg by mouth 3 (three) times daily with meals.     carvediloL 12.5 MG tablet  Commonly known as: COREG  Take 1 tablet (12.5 mg total) by mouth 2 (two) times daily.     diclofenac sodium 1 % Gel  Commonly known as: VOLTAREN  Apply 2 g topically once daily.     DULoxetine 20 MG capsule  Commonly known as: CYMBALTA  Take 2 capsules (40 mg total) by mouth once daily.     ELIQUIS 5 mg Tab  Generic drug: apixaban  TAKE 1 TABLET BY MOUTH TWICE A DAY     ergocalciferol 50,000 unit Cap  Commonly known as: ERGOCALCIFEROL  Take 1 capsule (50,000 Units total) by mouth every 7 days.     HYDROcodone-acetaminophen  mg per tablet  Commonly known as: NORCO  Take 1 tablet by mouth every 6 (six) hours as needed for Pain.     ipratropium 21 mcg (0.03 %) nasal spray  Commonly known as: ATROVENT  2 sprays by Each Nostril route 2 (two) times daily.     lactulose 20 gram/30 mL Soln  Commonly known as: CHRONULAC  Take 30 mLs (20 g total) by mouth every 12 (twelve) hours as needed (for constipation).     LANTUS SOLOSTAR U-100 INSULIN 100 unit/mL (3 mL) Inpn pen  Generic drug: insulin glargine U-100 (Lantus)  INJECT 12-15 UNITS UNDER THE SKIN at night     losartan 100 MG tablet  Commonly " "known as: COZAAR  Take 1 tablet (100 mg total) by mouth once daily.     methocarbamoL 750 MG Tab  Commonly known as: ROBAXIN  TAKE 1 TO 2 TABLETS(750 TO 1500 MG) BY MOUTH THREE TIMES DAILY AS NEEDED FOR MUSCLE SPASMS     oxybutynin 10 MG 24 hr tablet  Commonly known as: DITROPAN-XL  TAKE 1 TABLET BY MOUTH EVERY DAY     pen needle, diabetic 31 gauge x 5/16" Ndle  Commonly known as: BD ULTRA-FINE SHORT PEN NEEDLE  USE WITH LANTUS DAILY, e 11.65     sodium bicarbonate 650 MG tablet  Take 1 tablet (650 mg total) by mouth once daily.     sumatriptan 100 MG tablet  Commonly known as: IMITREX  Take 1 tablet (100 mg total) by mouth every 2 (two) hours as needed for Migraine (Limit 2 in 14 hours and 9 in one month).     SYNTHROID 50 mcg tablet  Generic drug: levothyroxine  TAKE 1 TABLET BY MOUTH BEFORE BREAKFAST. ADMINISTER ON AN EMPTY STOMACH AT LEAST 30 MINUTES BEFORE FOOD. IF RECEIVING TUBE FEEDS, HOLD TUBE FEEDS FOR 1 HOUR BEFORE AND AFTER LEVOTHYROXINE ADMINISTRATION.     traZODone 100 MG tablet  Commonly known as: DESYREL  TAKE 1 TABLET BY MOUTH NIGHTLY AS NEEDED FOR INSOMNIA.            STOP taking these medications      acetaminophen 325 MG tablet  Commonly known as: TYLENOL     atorvastatin 80 MG tablet  Commonly known as: LIPITOR     ondansetron 8 MG Tbdl  Commonly known as: ZOFRAN-ODT     oxyCODONE 5 MG immediate release tablet  Commonly known as: ROXICODONE     silver sulfADIAZINE 1% 1 % cream  Commonly known as: SILVADENE     UNABLE TO FIND     VELPHORO 500 mg Chew  Generic drug: sucroferric oxyhydroxide     VELTASSA 16.8 gram Pwpk  Generic drug: patiromer calcium sorbitex              Indwelling Lines/Drains at time of discharge:   Lines/Drains/Airways       Central Venous Catheter Line  Duration                  Hemodialysis Catheter 01/09/25 0955 right internal jugular <1 day              Drain  Duration                  Hemodialysis AV Fistula 02/14/24 1034 Left forearm 330 days         Suprapubic Catheter " 12/31/24 1050 16 Fr. 9 days                    45  minutes of time spent on discharge, including examining the patient, providing discharge instructions, arranging   follow up and documentation        Milton Julian MD  Attending Staff Physician  [facility]  pager- [phone]  [employer] - [phone]

## 2025-01-11 VITALS
OXYGEN SATURATION: 95 % | DIASTOLIC BLOOD PRESSURE: 65 MMHG | RESPIRATION RATE: 18 BRPM | SYSTOLIC BLOOD PRESSURE: 143 MMHG | HEART RATE: 71 BPM | HEIGHT: 68 IN | TEMPERATURE: 99 F | WEIGHT: 214 LBS | BODY MASS INDEX: 32.43 KG/M2

## 2025-01-11 LAB
ALBUMIN SERPL BCP-MCNC: 2.3 G/DL (ref 3.5–5.2)
ALP SERPL-CCNC: 82 U/L (ref 40–150)
ALT SERPL W/O P-5'-P-CCNC: 10 U/L (ref 10–44)
ANION GAP SERPL CALC-SCNC: 8 MMOL/L (ref 8–16)
AST SERPL-CCNC: 11 U/L (ref 10–40)
BASOPHILS # BLD AUTO: 0.04 K/UL (ref 0–0.2)
BASOPHILS NFR BLD: 0.7 % (ref 0–1.9)
BILIRUB SERPL-MCNC: 0.3 MG/DL (ref 0.1–1)
BUN SERPL-MCNC: 14 MG/DL (ref 8–23)
CALCIUM SERPL-MCNC: 8 MG/DL (ref 8.7–10.5)
CHLORIDE SERPL-SCNC: 100 MMOL/L (ref 95–110)
CO2 SERPL-SCNC: 19 MMOL/L (ref 23–29)
CREAT SERPL-MCNC: 1.7 MG/DL (ref 0.5–1.4)
DIFFERENTIAL METHOD BLD: ABNORMAL
EOSINOPHIL # BLD AUTO: 0.3 K/UL (ref 0–0.5)
EOSINOPHIL NFR BLD: 4.5 % (ref 0–8)
ERYTHROCYTE [DISTWIDTH] IN BLOOD BY AUTOMATED COUNT: 13.3 % (ref 11.5–14.5)
EST. GFR  (NO RACE VARIABLE): 31.7 ML/MIN/1.73 M^2
GLUCOSE SERPL-MCNC: 130 MG/DL (ref 70–110)
HCT VFR BLD AUTO: 28 % (ref 37–48.5)
HGB BLD-MCNC: 9.1 G/DL (ref 12–16)
IMM GRANULOCYTES # BLD AUTO: 0.05 K/UL (ref 0–0.04)
IMM GRANULOCYTES NFR BLD AUTO: 0.8 % (ref 0–0.5)
LYMPHOCYTES # BLD AUTO: 1.8 K/UL (ref 1–4.8)
LYMPHOCYTES NFR BLD: 29.2 % (ref 18–48)
MAGNESIUM SERPL-MCNC: 1.6 MG/DL (ref 1.6–2.6)
MCH RBC QN AUTO: 30 PG (ref 27–31)
MCHC RBC AUTO-ENTMCNC: 32.5 G/DL (ref 32–36)
MCV RBC AUTO: 92 FL (ref 82–98)
MONOCYTES # BLD AUTO: 0.9 K/UL (ref 0.3–1)
MONOCYTES NFR BLD: 15.3 % (ref 4–15)
NEUTROPHILS # BLD AUTO: 3 K/UL (ref 1.8–7.7)
NEUTROPHILS NFR BLD: 49.5 % (ref 38–73)
NRBC BLD-RTO: 0 /100 WBC
PHOSPHATE SERPL-MCNC: 3 MG/DL (ref 2.7–4.5)
PLATELET # BLD AUTO: 167 K/UL (ref 150–450)
PMV BLD AUTO: 10.2 FL (ref 9.2–12.9)
POCT GLUCOSE: 154 MG/DL (ref 70–110)
POCT GLUCOSE: 221 MG/DL (ref 70–110)
POTASSIUM SERPL-SCNC: 4.2 MMOL/L (ref 3.5–5.1)
PROT SERPL-MCNC: 5.6 G/DL (ref 6–8.4)
RBC # BLD AUTO: 3.03 M/UL (ref 4–5.4)
SODIUM SERPL-SCNC: 127 MMOL/L (ref 136–145)
WBC # BLD AUTO: 6.03 K/UL (ref 3.9–12.7)

## 2025-01-11 PROCEDURE — 25000003 PHARM REV CODE 250: Mod: HCNC

## 2025-01-11 PROCEDURE — 36415 COLL VENOUS BLD VENIPUNCTURE: CPT | Mod: HCNC

## 2025-01-11 PROCEDURE — 80053 COMPREHEN METABOLIC PANEL: CPT | Mod: HCNC

## 2025-01-11 PROCEDURE — 25000003 PHARM REV CODE 250: Mod: HCNC | Performed by: NURSE PRACTITIONER

## 2025-01-11 PROCEDURE — 83735 ASSAY OF MAGNESIUM: CPT | Mod: HCNC

## 2025-01-11 PROCEDURE — 25000003 PHARM REV CODE 250: Mod: HCNC | Performed by: HOSPITALIST

## 2025-01-11 PROCEDURE — 99233 SBSQ HOSP IP/OBS HIGH 50: CPT | Mod: HCNC,,, | Performed by: NURSE PRACTITIONER

## 2025-01-11 PROCEDURE — 85025 COMPLETE CBC W/AUTO DIFF WBC: CPT | Mod: HCNC

## 2025-01-11 PROCEDURE — 84100 ASSAY OF PHOSPHORUS: CPT | Mod: HCNC

## 2025-01-11 RX ORDER — AMLODIPINE BESYLATE 10 MG/1
10 TABLET ORAL DAILY
Qty: 90 TABLET | Refills: 3 | Status: SHIPPED | OUTPATIENT
Start: 2025-01-12 | End: 2025-01-17

## 2025-01-11 RX ORDER — FUROSEMIDE 80 MG/1
80 TABLET ORAL DAILY
Qty: 30 TABLET | Refills: 11 | Status: SHIPPED | OUTPATIENT
Start: 2025-01-11 | End: 2026-01-11

## 2025-01-11 RX ORDER — FUROSEMIDE 40 MG/1
80 TABLET ORAL DAILY
Status: DISCONTINUED | OUTPATIENT
Start: 2025-01-11 | End: 2025-01-11 | Stop reason: HOSPADM

## 2025-01-11 RX ADMIN — HYDROCODONE BITARTRATE AND ACETAMINOPHEN 1 TABLET: 10; 325 TABLET ORAL at 05:01

## 2025-01-11 RX ADMIN — SODIUM BICARBONATE 650 MG TABLET 650 MG: at 08:01

## 2025-01-11 RX ADMIN — SEVELAMER CARBONATE 800 MG: 800 TABLET, FILM COATED ORAL at 08:01

## 2025-01-11 RX ADMIN — AMLODIPINE BESYLATE 10 MG: 10 TABLET ORAL at 08:01

## 2025-01-11 RX ADMIN — LEVOTHYROXINE SODIUM 50 MCG: 0.05 TABLET ORAL at 06:01

## 2025-01-11 RX ADMIN — INSULIN ASPART 4 UNITS: 100 INJECTION, SOLUTION INTRAVENOUS; SUBCUTANEOUS at 11:01

## 2025-01-11 RX ADMIN — OXYBUTYNIN CHLORIDE 10 MG: 10 TABLET, EXTENDED RELEASE ORAL at 08:01

## 2025-01-11 RX ADMIN — ATORVASTATIN CALCIUM 80 MG: 40 TABLET, FILM COATED ORAL at 08:01

## 2025-01-11 RX ADMIN — POLYETHYLENE GLYCOL 3350 17 G: 17 POWDER, FOR SOLUTION ORAL at 08:01

## 2025-01-11 RX ADMIN — LOSARTAN POTASSIUM 100 MG: 50 TABLET, FILM COATED ORAL at 08:01

## 2025-01-11 RX ADMIN — ANASTROZOLE 1 MG: 1 TABLET, COATED ORAL at 08:01

## 2025-01-11 RX ADMIN — APIXABAN 5 MG: 5 TABLET, FILM COATED ORAL at 08:01

## 2025-01-11 RX ADMIN — SEVELAMER CARBONATE 800 MG: 800 TABLET, FILM COATED ORAL at 11:01

## 2025-01-11 RX ADMIN — CARVEDILOL 12.5 MG: 12.5 TABLET, FILM COATED ORAL at 08:01

## 2025-01-11 RX ADMIN — FUROSEMIDE 80 MG: 40 TABLET ORAL at 12:01

## 2025-01-11 RX ADMIN — DULOXETINE HYDROCHLORIDE 40 MG: 20 CAPSULE, DELAYED RELEASE ORAL at 08:01

## 2025-01-11 RX ADMIN — IPRATROPIUM BROMIDE 2 SPRAY: 42 SPRAY, METERED NASAL at 08:01

## 2025-01-11 NOTE — ASSESSMENT & PLAN NOTE
Hypervolemic Hyponatremia  -chronic and around baseline  -ok to discharge home, enforced fluid restrictions  -discharge with lasix 80 mg PO QD

## 2025-01-11 NOTE — PLAN OF CARE
Problem: Adult Inpatient Plan of Care  Goal: Plan of Care Review  Outcome: Adequate for Care Transition  Goal: Patient-Specific Goal (Individualized)  Outcome: Adequate for Care Transition  Goal: Absence of Hospital-Acquired Illness or Injury  Outcome: Adequate for Care Transition  Goal: Optimal Comfort and Wellbeing  Outcome: Adequate for Care Transition  Goal: Readiness for Transition of Care  Outcome: Adequate for Care Transition     Problem: Skin Injury Risk Increased  Goal: Skin Health and Integrity  Outcome: Adequate for Care Transition     Problem: Hemodialysis  Goal: Safe, Effective Therapy Delivery  Outcome: Adequate for Care Transition  Goal: Effective Tissue Perfusion  Outcome: Adequate for Care Transition  Goal: Absence of Infection Signs and Symptoms  Outcome: Adequate for Care Transition     Problem: Sepsis/Septic Shock  Goal: Optimal Coping  Outcome: Adequate for Care Transition  Goal: Absence of Bleeding  Outcome: Adequate for Care Transition  Goal: Blood Glucose Level Within Targeted Range  Outcome: Adequate for Care Transition  Goal: Absence of Infection Signs and Symptoms  Outcome: Adequate for Care Transition  Goal: Optimal Nutrition Intake  Outcome: Adequate for Care Transition     Problem: Diabetes Comorbidity  Goal: Blood Glucose Level Within Targeted Range  Outcome: Adequate for Care Transition     Problem: Acute Kidney Injury/Impairment  Goal: Fluid and Electrolyte Balance  Outcome: Adequate for Care Transition  Goal: Improved Oral Intake  Outcome: Adequate for Care Transition  Goal: Effective Renal Function  Outcome: Adequate for Care Transition     Problem: Infection  Goal: Absence of Infection Signs and Symptoms  Outcome: Adequate for Care Transition

## 2025-01-11 NOTE — ASSESSMENT & PLAN NOTE
74 year old female hx of ESRD on HD MWF presenting for AMS and fever, concern for sepsis.     Nephrology History  iHD Schedule: MF, now MWF per patient  Unit/MD: [facility]/Dr. Hare   Duration: 4 hours   EDW: 98 kg.   Access: JENNY AVF (has TDC scheduled to be removed)  Residual Renal Function: +++    Plan/Recommendations  -No emergent need for HD today.  Her hyponatremia is chronic in nature.  Need for IP admissions for this.  -discussed importance of fluid restrictions  -she has residual renal fx, please place on Lasix 80 mg PO QD  -Hgb goal 10-11, hgb 9.1. defer LUIS therapy to her OP HD unit   -Strict I&Os           -Renal diet if not NPO

## 2025-01-11 NOTE — PROGRESS NOTES
[facility] Medicine Telemetry  Nephrology  Progress Note    Patient Name: Cecile Bowen  MRN: [id number]  Admission Date: 12/31/2024  Hospital Length of Stay: 11 days  Attending Provider: Milton Julian MD   Primary Care Physician: Lurdes Navarro MD  Principal Problem:Severe sepsis    Subjective:     HPI: Ms. Cecile Bowen is a 74 y.o. female with ESRD on HD, T2DM, afib on Eliquis, HTN, obesity, history of breast cancer, chronic suprapubic catheter for neurogenic bladder and chronic back pain who presented to the [facility] [place]31 from Vascular Surgery for evaluation of fever and AMS.  History is obtained from patient.  She reports that over the last few days, she has been having fever and lethargy.  She went to see her Vascular Surgeon for right IJ PermCath removal, as her fistula has matured.  She is currently dialyzing only Mondays and Fridays via right IJ PermCath.  However, upon arrival to clinic she was febrile to 100.9F, slow to respond and tachy to 104.  She was sent to the ER for further evaluation.  At baseline, she uses a wheelchair.  She lives with her sister.  She currently endorses back pain that is not escalated from normal.  She is full code.  She took all of her medications prior to going to clinic except her Eliquis.     Upon arrival to the ER, vitals were temp 101.7F, , /77.  Labs showed WBC 20, Hgb 11.3, Sodium 129, BUN/Cr 28/3.2, Lactic 0.8.  UA was dirty.  Suprapubic catheter was exchanged in the ED.  CXR was negative.  She was given Vanc and Zosyn was admitted to Hospital Medicine for further management. Nephrology consulted for HD management.     Interval History:   NAEON.  Na improved to 127 this morning after HD yesterday.  She is asymptomatic.    Hypervolemic hyponatremia 2/2 ESRD status.  She does make urine.  Hyponatremia is chronic, dating back to 2021.      Review of patient's allergies indicates:  No Known Allergies  Current Facility-Administered  Medications   Medication Frequency    acetaminophen tablet 650 mg Q4H PRN    amLODIPine tablet 10 mg Daily    anastrozole tablet 1 mg Daily    apixaban tablet 5 mg BID    atorvastatin tablet 80 mg Daily    carvediloL tablet 12.5 mg BID    dextrose 50% injection 12.5 g PRN    dextrose 50% injection 12.5 g PRN    dextrose 50% injection 25 g PRN    dextrose 50% injection 25 g PRN    DULoxetine DR capsule 40 mg Daily    epoetin chloe-epbx injection 4,900 Units Every Mon, Wed, Fri    glucagon (human recombinant) injection 1 mg PRN    glucagon (human recombinant) injection 1 mg PRN    glucose chewable tablet 16 g PRN    glucose chewable tablet 16 g PRN    glucose chewable tablet 24 g PRN    glucose chewable tablet 24 g PRN    heparin (porcine) injection 1,000 Units PRN    HYDROcodone-acetaminophen  mg per tablet 1 tablet Q4H PRN    insulin aspart U-100 pen 1-10 Units QID (AC + HS) PRN    ipratropium 42 mcg (0.06 %) nasal spray 2 spray BID    levothyroxine tablet 50 mcg Before breakfast    losartan tablet 100 mg Daily    methocarbamoL tablet 750 mg TID PRN    naloxone 0.4 mg/mL injection 0.02 mg PRN    ondansetron injection 8 mg Q6H PRN    oxybutynin 24 hr tablet 10 mg Daily    polyethylene glycol packet 17 g Daily PRN    sevelamer carbonate tablet 800 mg TID WM    sodium bicarbonate tablet 650 mg Daily    sodium chloride 0.9% flush 5 mL PRN    traZODone tablet 100 mg Nightly PRN    vancomycin - pharmacy to dose pharmacy to manage frequency     Facility-Administered Medications Ordered in Other Encounters   Medication Frequency    epoetin chloe injection 2,000 Units 1 time in Clinic/HOD    heparin (porcine) injection 2,000 Units Once    heparin (porcine) injection 2,000 Units Once    heparin (porcine) injection 4,000 Units 1 time in Clinic/HOD    iron sucrose injection 100 mg 1 time in Clinic/HOD       Objective:     Vital Signs (Most Recent):  Temp: 98.7 °F (37.1 °C) (01/11/25 1116)  Pulse: 71 (01/11/25 1116)  Resp:  18 (01/11/25 1116)  BP: (!) 143/65 (01/11/25 1116)  SpO2: 95 % (01/11/25 1116) Vital Signs (24h Range):  Temp:  [98.2 °F (36.8 °C)-98.9 °F (37.2 °C)] 98.7 °F (37.1 °C)  Pulse:  [68-87] 71  Resp:  [16-18] 18  SpO2:  [94 %-98 %] 95 %  BP: (117-165)/(61-85) 143/65     Weight: 97.1 kg (214 lb) (01/01/25 0538)  Body mass index is 32.54 kg/m².  Body surface area is 2.16 meters squared.    I/O last 3 completed shifts:  In: 1660 [P.O.:960; Other:700]  Out: 2550 [Urine:350; Other:2200]     Physical Exam  Vitals and nursing note reviewed.   Constitutional:       Appearance: She is ill-appearing.   HENT:      Head: Normocephalic.   Eyes:      Conjunctiva/sclera: Conjunctivae normal.   Cardiovascular:      Rate and Rhythm: Normal rate.      Arteriovenous access: Left arteriovenous access is present.     Comments: LUE AVF + thrill  Pulmonary:      Effort: Pulmonary effort is normal. No respiratory distress.   Abdominal:      Palpations: Abdomen is soft.      Tenderness: There is no abdominal tenderness.   Musculoskeletal:      Right lower leg: Edema present.      Left lower leg: Edema present.   Skin:     General: Skin is warm and dry.   Neurological:      Mental Status: She is alert.   Psychiatric:         Mood and Affect: Mood normal.          Significant Labs:  CBC:   Recent Labs   Lab 01/11/25  0514   WBC 6.03   RBC 3.03*   HGB 9.1*   HCT 28.0*      MCV 92   MCH 30.0   MCHC 32.5     CMP:   Recent Labs   Lab 01/11/25  0514   *   CALCIUM 8.0*   ALBUMIN 2.3*   PROT 5.6*   *   K 4.2   CO2 19*      BUN 14   CREATININE 1.7*   ALKPHOS 82   ALT 10   AST 11   BILITOT 0.3      Assessment/Plan:     Cardiac/Vascular  Hyperlipidemia  Hypervolemic Hyponatremia  -chronic and around baseline  -ok to discharge home, enforced fluid restrictions  -discharge with lasix 80 mg PO QD    Renal/  ESRD (end stage renal disease) on dialysis  74 year old female hx of ESRD on HD MWF presenting for AMS and fever, concern for  sepsis.     Nephrology History  iHD Schedule: MF, now MWF per patient  Unit/MD: [facility]/Dr. Hare   Duration: 4 hours   EDW: 98 kg.   Access: JENNY AVF (has TDC scheduled to be removed)  Residual Renal Function: +++    Plan/Recommendations  -No emergent need for HD today.  Her hyponatremia is chronic in nature.  Need for IP admissions for this.  -discussed importance of fluid restrictions  -she has residual renal fx, please place on Lasix 80 mg PO QD  -Hgb goal 10-11, hgb 9.1. defer LUIS therapy to her OP HD unit   -Strict I&Os           -Renal diet if not NPO         Saul Rogers, NP  Nephrology  [facility] - Internal Medicine Telemetry

## 2025-01-11 NOTE — PROGRESS NOTES
Petros Shaffer - Internal Medicine [facility] Medicine  Progress Note    Patient Name: Cecile Bowen  MRN: [id number]  Patient Class: IP- Inpatient   Admission Date: 12/31/2024  Length of Stay: 11 days  Attending Physician: Milton Julian MD  Primary Care Provider: Lurdes Navarro MD        Subjective     Principal Problem:Severe sepsis        HPI:  Ms. Cecile Bowen is a 74 y.o. female with ESRD on HD, T2DM, afib on Eliquis, HTN, obesity, history of breast cancer, chronic suprapubic catheter for neurogenic bladder and chronic back pain who presented to the [facility] ED 12/31 from Vascular Surgery for evaluation of fever and AMS.  History is obtained from patient.  She reports that over the last few days, she has been having fever and lethargy.  She went to see her Vascular Surgeon for right IJ PermCath removal, as her fistula has matured.  She is currently dialyzing only Mondays and Fridays via right IJ PermCath.  However, upon arrival to clinic she was febrile to 100.9F, slow to respond and tachy to 104.  She was sent to the ER for further evaluation.  At baseline, she uses a wheelchair.  She lives with her sister.  She currently endorses back pain that is not escalated from normal.  She is full code.  She took all of her medications prior to going to clinic except her Eliquis.    Upon arrival to the ER, vitals were temp 101.7F, , /77.  Labs showed WBC 20, Hgb 11.3, Sodium 129, BUN/Cr 28/3.2, Lactic 0.8.  UA was dirty.  Suprapubic catheter was exchanged in the ED.  CXR was negative.  She was given Vanc and Zosyn was admitted to Hospital Medicine for further management.    Overview/Hospital Course:  Ms. Bowen is a 74 y.o. female with ESRD on HD, T2DM, afib on Eliquis, HTN, obesity, history of breast cancer, chronic suprapubic catheter for neurogenic bladder and chronic back pain who was admitted for severe sepsis 2/2 Enterococcus bacteremia and suprapubic UTI.  She was started on Zosyn.   After her blood cx returned positive, ID was consulted and she was started on Dapto for Enterococcus. Switched to Vanc . Plan for 8 weeks of Vanc with HD from discharge Urine cx returned with ESBL Klebsiella and Proteus, unclear if this is a UTI or just colonization. S/p one dose of gentamicin per ID Had issues with AVF for HD during stay. Vascular surgery was consulted s/p fistulogram 1/6 with good flow. RN unable to cannulate AVF with arterial and venous needle despite fistulogram  s/p line removal by IR today for 24-48 hr line holiday; Plan for TDC placement by IR in am NPO after MN will need OP follow up with Vascular    1/9 Severe sepsis 2/2 Enterococcus bacteremia and suprapubic UTI.   ID was consulted and she was started on Dapto for Enterococcus. Switched to Vancomycin .  Had issues with AVF for HD during stay. Vascular surgery was consulted s/p fistulogram 1/6 with good flow. RN unable to cannulate AVF with arterial and venous needle despite fistulogram.   s/p line removal by IR 01/07 for 24-48 hr line holiday; Plan for 8 weeks of Vanc with HD from discharge per ID recs.   Vancomycin 500 mg IV after HD. Re-dose when the random level is less than 20. s/p DC placement by IR today. HD today after TDC placement. sodium trended down to 123. Fluid restriction 1L/24h . UF with goal 2L today. needs HD today after TDC placement. hold eliquis today for TDC.  needs OP follow up with Vascular surgery   1/10 sodium 125 .HD today   1/11 sats 98% on RA. sodium improved from  125 to 127.  Nephrology follow up - started lasix 80mg PO QDaily. Follow up with outpatient HD on 1/12. Discharge home           Review of Systems:   Pain scale:   Constitutional:  fever,  chills, headache, vision loss, hearing loss, weight loss, Generalized weakness, falls, loss of smell, loss of taste, poor appetite,  sore throat  Respiratory: cough, shortness of breath.   Cardiovascular: chest pain, dizziness, palpitations, orthopnea, swelling of  feet, syncope  Gastrointestinal: nausea, vomiting, abdominal pain, diarrhea, black stool,  blood in stool, change in bowel habits, constipation  Genitourinary: hematuria, dysuria, urgency, frequency  Integument/Breast: rash,  pruritis  Hematologic/Lymphatic: easy bruising, lymphadenopathy  Musculoskeletal: arthralgias , myalgias, back pain, neck pain, knee pain  Neurological: confusion, seizures, tremors, slurred speech  Behavioral/Psych:  depression, anxiety, auditory or visual hallucinations     OBJECTIVE:     Physical Exam:  Body mass index is 32.54 kg/m².    Constitutional: Appears well-developed and well-nourished. obesity  Head: Normocephalic and atraumatic.   Neck: Normal range of motion. Neck supple.   Cardiovascular: Normal heart rate.  Regular heart rhythm.  Pulmonary/Chest: Effort normal.   Abdominal: No distension.  No tenderness.suprapubic catheter   Musculoskeletal: Normal range of motion. No edema.   Neurological: Alert and oriented to person, place, and time.   Skin: Skin is warm and dry.   Psychiatric: Normal mood and affect. Behavior is normal.                  Vital Signs  Temp: 98.7 °F (37.1 °C) (01/11/25 1116)  Pulse: 71 (01/11/25 1116)  Resp: 18 (01/11/25 1116)  BP: (!) 143/65 (01/11/25 1116)  SpO2: 95 % (01/11/25 1116)     24 Hour VS Range    Temp:  [98.2 °F (36.8 °C)-98.9 °F (37.2 °C)]   Pulse:  [68-87]   Resp:  [16-18]   BP: (117-165)/(61-85)   SpO2:  [94 %-98 %]     Intake/Output Summary (Last 24 hours) at 1/11/2025 1222  Last data filed at 1/11/2025 1149  Gross per 24 hour   Intake 1420 ml   Output 2650 ml   Net -1230 ml         I/O This Shift:  I/O this shift:  In: 240 [P.O.:240]  Out: 300 [Urine:300]    Wt Readings from Last 3 Encounters:   01/01/25 97.1 kg (214 lb)   09/19/24 103.7 kg (228 lb 9.9 oz)   06/13/24 103.7 kg (228 lb 9.9 oz)       I have personally reviewed the vitals and recorded Intake/Output     Laboratory/Diagnostic Data:    CBC/Anemia Labs: Coags:    Recent Labs   Lab  "01/09/25  0343 01/10/25  0230 01/11/25  0514   WBC 7.29 8.91 6.03   HGB 8.8* 9.0* 9.1*   HCT 27.4* 28.4* 28.0*    253 167   MCV 92 92 92   RDW 13.0 12.9 13.3    Recent Labs   Lab 01/09/25  0343   INR 1.1        Chemistries: ABG:   Recent Labs   Lab 01/09/25  0343 01/10/25  0230 01/11/25  0514   * 125* 127*   K 4.6 4.1 4.2   CL 93* 92* 100   CO2 21* 23 19*   BUN 34* 23 14   CREATININE 3.0* 2.1* 1.7*   CALCIUM 8.1* 8.1* 8.0*   PROT 5.5* 6.1 5.6*   BILITOT 0.3 0.3 0.3   ALKPHOS 78 92 82   ALT 13 10 10   AST 10 12 11   MG 1.6 1.6 1.6   PHOS 5.0* 3.6 3.0    No results for input(s): "PH", "PCO2", "PO2", "HCO3", "POCSATURATED", "BE" in the last 168 hours.     POCT Glucose: HbA1c:    Recent Labs   Lab 01/10/25  0758 01/10/25  1247 01/10/25  1610 01/10/25  2046 01/11/25  0758 01/11/25  1113   POCTGLUCOSE 150* 129* 245* 177* 154* 221*    Hemoglobin A1C   Date Value Ref Range Status   12/31/2024 5.9 (H) 4.0 - 5.6 % Final     Comment:     ADA Screening Guidelines:  5.7-6.4%  Consistent with prediabetes  >or=6.5%  Consistent with diabetes    High levels of fetal hemoglobin interfere with the HbA1C  assay. Heterozygous hemoglobin variants (HbS, HgC, etc)do  not significantly interfere with this assay.   However, presence of multiple variants may affect accuracy.     04/22/2024 6.2 (H) 4.8 - 5.9 % Final   10/06/2023 5.0 4.8 - 5.9 % Final        Cardiac Enzymes: Ejection Fractions:    No results for input(s): "CPK", "CPKMB", "MB", "TROPONINI" in the last 72 hours. EF   Date Value Ref Range Status   08/07/2023 60 % Final   11/29/2021 60 % Final          No results for input(s): "COLORU", "APPEARANCEUA", "PHUR", "SPECGRAV", "PROTEINUA", "GLUCUA", "KETONESU", "BILIRUBINUA", "OCCULTUA", "NITRITE", "UROBILINOGEN", "LEUKOCYTESUR", "RBCUA", "WBCUA", "BACTERIA", "SQUAMEPITHEL", "HYALINECASTS" in the last 48 hours.    Invalid input(s): "WRIGHTSUR"    Procalcitonin (ng/mL)   Date Value   12/31/2024 1.41 (H)   02/11/2022 0.12 " "  12/06/2021 0.69 (H)     Lactate (Lactic Acid) (mmol/L)   Date Value   12/31/2024 0.8   08/17/2023 0.6   08/06/2023 0.8   02/11/2022 1.4   02/11/2022 1.8     BNP (pg/mL)   Date Value   08/17/2023 495 (H)   08/06/2023 390 (H)   01/21/2022 167 (H)   01/20/2022 190 (H)   11/28/2021 773 (H)     CRP (mg/L)   Date Value   06/06/2024 13.5 (H)   06/30/2010 6.4   06/11/2010 4.9     Sed Rate   Date Value   06/06/2024 44 mm/Hr (H)   03/12/2015 110 mm/Hr (H)   06/11/2010 48 mm/hr (H)   02/09/2004 35 mm/hr (H)     No results found for: "DDIMER"  Ferritin (ng/mL)   Date Value   12/04/2024 626 (H)   11/06/2024 333 (H)   10/04/2024 594 (H)   09/04/2024 546 (H)   08/12/2024 510 (H)   07/01/2024 525 (H)   06/07/2024 554 (H)   05/01/2024 697 (H)   04/05/2024 483 (H)   03/08/2024 357 (H)     LD (U/L)   Date Value   03/12/2015 150     Troponin I (ng/mL)   Date Value   08/07/2023 0.066 (H)   08/06/2023 0.066 (H)   08/06/2023 0.063 (H)   01/21/2022 0.018   01/20/2022 0.014   11/29/2021 0.155 (H)   11/28/2021 0.181 (H)   11/28/2021 0.201 (H)     CPK (U/L)   Date Value   01/02/2025 62   11/14/2023 56   11/29/2021 789 (H)   09/28/2021 32   09/05/2021 43   02/09/2004 82     Results for orders placed or performed in visit on 01/05/24   Vitamin D    Collection Time: 01/05/24 12:00 AM   Result Value Ref Range    Vit D, 25-Hydroxy 44.9 30.0 - 100.0 ng/mL   Results for orders placed or performed in visit on 10/02/23   Vitamin D    Collection Time: 10/06/23 12:00 AM   Result Value Ref Range    Vit D, 25-Hydroxy 45.8 30.0 - 100.0 ng/mL     *Note: Due to a large number of results and/or encounters for the requested time period, some results have not been displayed. A complete set of results can be found in Results Review.     SARS-CoV2 (COVID-19) Qualitative PCR (no units)   Date Value   12/31/2024 Not Detected   02/15/2022 Not Detected   12/28/2021 Not Detected   12/21/2021 Not Detected   09/05/2021 Not Detected     POC Rapid COVID (no units) "   Date Value   02/11/2022 Negative   01/21/2022 Negative   01/20/2022 Negative   11/28/2021 Negative   09/28/2021 Negative   09/05/2021 Negative   05/14/2021 Negative       Microbiology labs for the last week  Microbiology Results (last 7 days)       Procedure Component Value Units Date/Time    Blood culture [[id number]] Collected: 01/02/25 0529    Order Status: Completed Specimen: Blood from Peripheral, Hand, Right Updated: 01/07/25 0812     Blood Culture, Routine No growth after 5 days.    Blood culture [[id number]] Collected: 01/02/25 0526    Order Status: Completed Specimen: Blood from Peripheral, Antecubital, Right Updated: 01/07/25 0812     Blood Culture, Routine No growth after 5 days.    Urine culture [[id number]]  (Abnormal)  (Susceptibility) Collected: 12/31/24 1049    Order Status: Completed Specimen: Urine Updated: 01/05/25 1445     Urine Culture, Routine KLEBSIELLA PNEUMONIAE ESBL  50,000 - 99,999 cfu/ml        PROTEUS MIRABILIS  10,000 - 49,999 cfu/ml      Narrative:      Specimen Source->Urine            Reviewed and noted in plan where applicable- Please see chart for full lab data.    Lines/Drains:       Peripheral IV - Single Lumen 01/03/25 1708 20 G 1 3/4 in No Anterior;Right Upper Arm (Active)   Site Assessment Clean;Dry;Intact 01/08/25 2123   Line Securement Device Secured with sutureless device 01/08/25 2123   Extremity Assessment Distal to IV No abnormal discoloration;No redness;No swelling;No warmth 01/08/25 2123   Line Status Saline locked 01/08/25 2123   Dressing Status Clean;Intact;Dry 01/08/25 2123   Dressing Intervention Integrity maintained 01/08/25 2123   Dressing Change Due 01/07/25 01/08/25 2123   Site Change Due 01/07/25 01/08/25 2123   Reason Not Rotated Poor venous access 01/08/25 2123   Number of days: 5            Hemodialysis AV Fistula 02/14/24 1034 Left forearm (Active)   Needle Size 15ga 01/06/25 1447   Site Assessment Clean;Dry;Intact;No redness;No swelling 01/07/25 0912    Patency Present;Thrill 01/07/25 2030   Status Deaccessed 01/07/25 0912   Flows Good 01/06/25 1803   Dressing Intervention Integrity maintained 01/07/25 0912   Dressing Status Clean;Dry;Intact 01/07/25 0912   Site Condition No complications 01/06/25 1803   Number of days: 329            Suprapubic Catheter 12/31/24 1050 16 Fr. (Active)   Clamp Status unclamped 01/08/25 2123   Dressing no dressing 01/08/25 2123   Drainage yellow drainage 01/06/25 1022   Urine Color Yellow 01/08/25 2123   Collection Container Standard drainage bag 01/08/25 2123   Securement secured to abdomen w/ adhesive device 01/08/25 2123   Output (mL) 550 mL 01/08/25 2056   Number of days: 8       Imaging  ECG Results              EKG 12-lead (Final result)        Collection Time Result Time QRS Duration OHS QTC Calculation    12/31/24 09:33:53 12/31/24 14:17:21 78 432                     Final result by Interface, Lab In [facility] (12/31/24 14:17:28)                   Narrative:    Test Reason : Z13.6,    Vent. Rate : 103 BPM     Atrial Rate : 103 BPM     P-R Int : 188 ms          QRS Dur :  78 ms      QT Int : 330 ms       P-R-T Axes :   8 -18  13 degrees    QTcB Int : 432 ms    Sinus tachycardia  Voltage criteria for left ventricular hypertrophy  Possible Lateral infarct ,age undetermined  Inferior infarct ,age undetermined  Abnormal ECG  When compared with ECG of 12-Jun-2024 15:18,  Nonspecific ST abnormality present in inferior leads    Confirmed by Cesar Sotelo ([id number]) on 12/31/2024 2:17:16 PM    Referred By: AAAREFERRAL SELF           Confirmed By: Cesar Sotelo                                    Results for orders placed during the hospital encounter of 12/31/24    Echo    Interpretation Summary    Left Ventricle: The left ventricle is normal in size. Normal wall thickness. Normal wall motion. There is normal systolic function with a visually estimated ejection fraction of 60 - 65%. Grade II diastolic dysfunction.    Right Ventricle: Normal  right ventricular cavity size. Wall thickness is normal. Systolic function is normal.    Left Atrium: Left atrium is moderately dilated.    Aortic Valve: The aortic valve is a trileaflet valve. There is moderate aortic valve sclerosis. Mildly restricted motion. There is mild stenosis. Aortic valve area by VTI is 1.8 cm². Aortic valve peak velocity is 2.0 m/s. Mean gradient is 9.6 mmHg. The dimensionless index is 0.58.    Pulmonary Artery: The estimated pulmonary artery systolic pressure is 26 mmHg.    IVC/SVC: IVC was not well visualized due to poor acoustic window.      IR Tunneled Catheter Insert w/o Port  Narrative: EXAMINATION:  Tunneled central venous catheter placement    Procedural Personnel    Attending physician(s): Froy Garza    Fellow physician(s): None    Resident physician(s): None    Advanced practice provider(s): None    Pre-procedure diagnosis: Renal failure    Post-procedure diagnosis: Same    Indication: Performance of hemodialysis    Additional clinical history: None    Complications: No immediate complications.    TECHNIQUE:  - Venous access with ultrasound guidance    - Tunneled central venous catheter insertion with fluoroscopic guidance    FINDINGS:  Pre-procedure    History and imaging of central venous access reviewed (QCDR): Yes    Consent: Informed consent for the procedure was obtained and time-out was performed prior to the procedure.    Prophylactic antibiotic administered: None    Preparation (MIPS): The site was prepared and draped using all elements of maximal sterile barrier technique including sterile gloves, sterile gown, cap, mask, large sterile sheet, sterile ultrasound probe cover, hand hygiene and cutaneous antisepsis with 2% chlorhexidine.    Medical reason for site preparation exception (MIPS): Not applicable    Anesthesia/sedation    The IR procedural team has confirmed the patient ID and re-evaluated the patient and sedation plan confirming it is suitable for the  patient's condition and procedure.    Level of anesthesia/sedation: Moderate sedation (conscious sedation)    Anesthesia/sedation administered by: Nurse or other independent trained observer    Total intra-service sedation time (minutes): 15    Access    Local anesthesia was administered. The vessel was sonographically evaluated and determined to be patent. Real time ultrasound was used to visualize needle entry into the vessel and a permanent image was stored.    Vein accessed: Internal jugular vein    Access technique: Micropuncture set with 21 gauge needle    Venography    Indication for venography: Confirmation of position or mapping of vascular anatomy    Catheter tip position for venography: SVC    Venous segment imaged: SVC    Venogram demonstrates appropriate antegrade flow into the right atrium    Catheter placement    An incision was made near the venous access site and the catheter was tunneled subcutaneously to the venous access site . The catheter was advanced via a peel-away sheath into the vein under fluoroscopic guidance. Catheter tip location was fluoroscopically verified and a permanent image was stored.    Catheter placed: Bard Glidepath    Catheter size (Vincentian): 14.5    Catheter cuff-to-tip or trimmed length (cm): 19    Catheter tip position: 2.5 VBUs below kaity.    Unique Device Identifier (TIFF): Not available    Catheter flush: Heparin (1000 units/mL)    Closure    A sterile dressing was applied.    Access site closure technique: Tissue adhesive    Catheter securement technique: Non-absorbable suture    Contrast    Contrast agent: None    Contrast volume (mL): 0    Radiation Dose    Fluoroscopy time ( minutes): 1.6    Reference air kerma ( mGy): 15.3    Kerma area product ( uGy-m2): 475.02    Additional Details    Additional description of procedure: None    Equipment details: None    Specimens removed: None    Estimated blood loss (mL): Less than 10    Standardized report:  "[email or web address]    Attestation    Signer name: Froy Garza    I attest that I was present for the entire procedure. I reviewed the stored images and agree with the report as written.  Impression: Insertion of right-sided supradiaphragmatic dual-lumen tunneled dialysis catheter, with tip in the expected location of the cavoatrial junction.    Plan:    The catheter may be used immediately.    _______________________________________________________________    Electronically signed by: Froy Sandhucassia  Date:    01/09/2025  Time:    17:36      Labs, Imaging, EKG and Diagnostic results from 1/11/2025 were reviewed.    Medications:  Medication list was reviewed and changes noted under Assessment/Plan.  Current Facility-Administered Medications on File Prior to Encounter   Medication Dose Route Frequency Provider Last Rate Last Admin    epoetin chloe injection 2,000 Units  2,000 Units Intravenous 1 time in Clinic/HOD Emmanuel Hare Jr., MD        heparin (porcine) injection 2,000 Units  2,000 Units Intravenous Once Emmanuel Hare Jr., MD        heparin (porcine) injection 2,000 Units  2,000 Units Intravenous Once Emmanuel Hare Jr., MD        heparin (porcine) injection 4,000 Units  4,000 Units Intra-Catheter 1 time in Clinic/HOD Emmanuel Hare Jr., MD        iron sucrose injection 100 mg  100 mg Intravenous 1 time in Clinic/HOD Emmanuel Hare Jr., MD         Current Outpatient Medications on File Prior to Encounter   Medication Sig Dispense Refill    ELIQUIS 5 mg Tab TAKE 1 TABLET BY MOUTH TWICE A  tablet 3    acetaminophen (TYLENOL) 325 MG tablet Take 2 tablets (650 mg total) by mouth every 6 (six) hours as needed for Pain.      amLODIPine (NORVASC) 10 MG tablet Take 10 mg by mouth.      anastrozole (ARIMIDEX) 1 mg Tab TAKE 1 TABLET BY MOUTH EVERY DAY 90 tablet 2    BD INSULIN SYRINGE ULTRA-FINE 0.5 mL 31 gauge x 5/16" Syrg USE WITH INSULIN 4 TIMES A  each 12    calcium " acetate,phosphat bind, (PHOSLO) 667 mg capsule Take 667 mg by mouth 3 (three) times daily with meals.      carvediloL (COREG) 12.5 MG tablet Take 1 tablet (12.5 mg total) by mouth 2 (two) times daily. 180 tablet 3    diclofenac sodium (VOLTAREN) 1 % Gel Apply 2 g topically once daily.      DULoxetine (CYMBALTA) 20 MG capsule Take 2 capsules (40 mg total) by mouth once daily. 180 capsule 1    erenumab-aooe (AIMOVIG AUTOINJECTOR) 140 mg/mL autoinjector 140 mg MONTHLY (route: subcutaneous) 1 each 11    ergocalciferol (ERGOCALCIFEROL) 50,000 unit Cap Take 1 capsule (50,000 Units total) by mouth every 7 days. 12 capsule 3    furosemide (LASIX) 40 MG tablet Take 1 tablet (40 mg total) by mouth once daily. 30 tablet 11    HYDROcodone-acetaminophen (NORCO)  mg per tablet Take 1 tablet by mouth every 6 (six) hours as needed for Pain. 120 tablet 0    ipratropium (ATROVENT) 21 mcg (0.03 %) nasal spray 2 sprays by Each Nostril route 2 (two) times daily. 30 mL 0    lactulose (CHRONULAC) 20 gram/30 mL Soln Take 30 mLs (20 g total) by mouth every 12 (twelve) hours as needed (for constipation). 900 mL 0    LANTUS SOLOSTAR U-100 INSULIN 100 unit/mL (3 mL) InPn pen INJECT 12-15 UNITS UNDER THE SKIN at night 15 mL 3    losartan (COZAAR) 100 MG tablet Take 1 tablet (100 mg total) by mouth once daily. 90 tablet 3    methocarbamoL (ROBAXIN) 750 MG Tab TAKE 1 TO 2 TABLETS(750 TO 1500 MG) BY MOUTH THREE TIMES DAILY AS NEEDED FOR MUSCLE SPASMS 180 tablet 1    ondansetron (ZOFRAN-ODT) 8 MG TbDL 8 mg EVERY 12 HOURS (route: oral)      oxybutynin (DITROPAN-XL) 10 MG 24 hr tablet TAKE 1 TABLET BY MOUTH EVERY DAY 90 tablet 4    oxyCODONE (ROXICODONE) 5 MG immediate release tablet Take 1 tablet (5 mg total) by mouth daily as needed (severe pain). 30 tablet 0    patiromer calcium sorbitex (VELTASSA) 16.8 gram PwPk Take 1 packet (16.8 g total) by mouth once daily. for 90 doses 30 packet 2    pen needle, diabetic (BD ULTRA-FINE SHORT PEN  "NEEDLE) 31 gauge x 5/16" Ndle USE WITH LANTUS DAILY, e 11.65 100 each 3    silver sulfADIAZINE 1% (SILVADENE) 1 % cream Apply topically Every 3 (three) days. 20 g 0    sodium bicarbonate 650 MG tablet Take 1 tablet (650 mg total) by mouth once daily. 30 tablet 11    sumatriptan (IMITREX) 100 MG tablet Take 1 tablet (100 mg total) by mouth every 2 (two) hours as needed for Migraine (Limit 2 in 14 hours and 9 in one month). 30 tablet 1    SYNTHROID 50 mcg tablet TAKE 1 TABLET BY MOUTH BEFORE BREAKFAST. ADMINISTER ON AN EMPTY STOMACH AT LEAST 30 MINUTES BEFORE FOOD. IF RECEIVING TUBE FEEDS, HOLD TUBE FEEDS FOR 1 HOUR BEFORE AND AFTER LEVOTHYROXINE ADMINISTRATION. 90 tablet 2    traZODone (DESYREL) 100 MG tablet TAKE 1 TABLET BY MOUTH NIGHTLY AS NEEDED FOR INSOMNIA. 90 tablet 2    UNABLE TO FIND medication name: Tylenol Migraine- APAP, ASA, caffeine      VELPHORO 500 mg Chew CHEW INSERT TABLET 5 TIMES DAILY WITH MEAL AND SNACK       Scheduled Medications:  Current Facility-Administered Medications   Medication Dose Route Frequency    amLODIPine  10 mg Oral Daily    anastrozole  1 mg Oral Daily    apixaban  5 mg Oral BID    atorvastatin  80 mg Oral Daily    carvediloL  12.5 mg Oral BID    DULoxetine  40 mg Oral Daily    epoetin chloe-ebpx (RETACRIT) injection  50 Units/kg Intravenous Every Mon, Wed, Fri    furosemide  80 mg Oral Daily    ipratropium  2 spray Each Nostril BID    levothyroxine  50 mcg Oral Before breakfast    losartan  100 mg Oral Daily    oxybutynin  10 mg Oral Daily    sevelamer carbonate  800 mg Oral TID WM    sodium bicarbonate  650 mg Oral Daily     PRN:   Current Facility-Administered Medications:     acetaminophen, 650 mg, Oral, Q4H PRN    dextrose 50%, 12.5 g, Intravenous, PRN    dextrose 50%, 12.5 g, Intravenous, PRN    dextrose 50%, 25 g, Intravenous, PRN    dextrose 50%, 25 g, Intravenous, PRN    glucagon (human recombinant), 1 mg, Intramuscular, PRN    glucagon (human recombinant), 1 mg, " Intramuscular, PRN    glucose, 16 g, Oral, PRN    glucose, 16 g, Oral, PRN    glucose, 24 g, Oral, PRN    glucose, 24 g, Oral, PRN    heparin (porcine), 1,000 Units, Intra-Catheter, PRN    HYDROcodone-acetaminophen, 1 tablet, Oral, Q4H PRN    insulin aspart U-100, 1-10 Units, Subcutaneous, QID (AC + HS) PRN    methocarbamoL, 750 mg, Oral, TID PRN    naloxone, 0.02 mg, Intravenous, PRN    ondansetron, 8 mg, Intravenous, Q6H PRN    polyethylene glycol, 17 g, Oral, Daily PRN    sodium chloride 0.9%, 5 mL, Intravenous, PRN    traZODone, 100 mg, Oral, Nightly PRN    Pharmacy to dose Vancomycin consult, , , Once **AND** vancomycin - pharmacy to dose, , Intravenous, pharmacy to manage frequency  Infusions:   Estimated Creatinine Clearance: 36.5 mL/min (A) (based on SCr of 1.7 mg/dL (H)).           Assessment and Plan     * Severe sepsis  Pt admitted for severe sepsis 2/2 CAUTI and Enterococcus bacteremia. On arrival febrile to 101.7F, tachycardic 102 with WBC 21 and slow to respond (encephalopathy). Suprapubic catheter changed in the ED. WBC down to 12, Procal 1.4. Urine cx with ESBL Klebsiella and Proteus. Blood cx 12.31 with Enterococcus faecalis. Blood cx 1.2 NGTD.     - ID consulted, appreciate recommendations:  -- 1 time dose of gentamicin  -- Continue vanc for Enterococcus faecalis bacteremia  -- Of note, does have a tunneled line which is likely the culprit of infection however there are issues with her AVF     s/p line removal by IR today for 24-48 hr line holiday;  Plan for TDC placement by IR in am NPO after MN    1/9 Severe sepsis 2/2 Enterococcus bacteremia and suprapubic UTI.   ID was consulted and she was started on Dapto for Enterococcus. Switched to Vancomycin .  Had issues with AVF for HD during stay. Vascular surgery was consulted s/p fistulogram 1/6 with good flow. RN unable to cannulate AVF with arterial and venous needle despite fistulogram.   s/p line removal by IR 01/07 for 24-48 hr line holiday;  Plan for 8 weeks of Vanc with HD from discharge per ID recs    HTN (hypertension)  Patient's blood pressure range in the last 24 hours was: BP  Min: 122/67  Max: 188/71.The patient's inpatient anti-hypertensive regimen is listed below:  Current Antihypertensives  losartan tablet 100 mg, Daily, Oral  carvediloL tablet 12.5 mg, 2 times daily, Oral  amLODIPine tablet 10 mg, Daily, Oral    Plan  - BP increasing since holding BP meds for sepsis  - continue Coreg and Losartan.  - restarting home norvasc    Positive JASON (antinuclear antibody)  History noted      Personal history of malignant neoplasm of breast  History noted  Continue Arimidex      ESRD (end stage renal disease) on dialysis  Creatine stable for now. BMP reviewed- noted Estimated Creatinine Clearance: 20.7 mL/min (A) (based on SCr of 3 mg/dL (H)). according to latest data. Based on current GFR, CKD stage is end stage.  Monitor UOP and serial BMP and adjust therapy as needed. Renally dose meds. Avoid nephrotoxic medications and procedures.    Nephro consult  Has been getting HD MF via right IJ PermCath  Needs f/u with Vascular Surgery for IJ removal  Vascular Surgery consulted for problem cannulating HD access   s/p fistulogram 1/6 with good flow.RN unable to cannulate AVF with arterial and venous needle despite fistulogram    s/p line removal by IR today for 24-48 hr line holiday   Plan for TDC placement by IR in am NPO after MN    Pressure injury of right buttock, stage 3  Wound Care      Anemia in ESRD (end-stage renal disease)  Anemia is likely due to chronic disease due to ESRD. Most recent hemoglobin and hematocrit are listed below.  Recent Labs     01/09/25  0343 01/10/25  0230 01/11/25  0514   HGB 8.8* 9.0* 9.1*   HCT 27.4* 28.4* 28.0*     Plan  - Monitor serial CBC: Daily  - Transfuse PRBC if patient becomes hemodynamically unstable, symptomatic or H/H drops below 7/21.  - Patient has not received any PRBC transfusions to date      Unspecified  atrial fibrillation  Patient has paroxysmal (<7 days) atrial fibrillation. Patient is currently in sinus rhythm. [id number] Score: 3. The patients heart rate in the last 24 hours is as follows:  Pulse  Min: 65  Max: 76     Antiarrhythmics       Anticoagulants  heparin (porcine) injection 1,000 Units, As needed (PRN), Intra-Catheter  apixaban tablet 5 mg, 2 times daily, Oral    Plan  - Replete lytes with a goal of K>4, Mg >2  - Patient is anticoagulated, see medications listed above.  - Patient's afib is currently controlled  - Previously had afib, and on Eliquis once a day because of bruising?  As she is being monitored will put on appropriate BID dosing    Bacteremia due to Enterococcus  See severe sepsis      Catheter-associated urinary tract infection  See severe sepsis  1/9 Severe sepsis 2/2 Enterococcus bacteremia and suprapubic UTI.   ID was consulted and she was started on Dapto for Enterococcus. Switched to Vancomycin .  Had issues with AVF for HD during stay. Vascular surgery was consulted s/p fistulogram 1/6 with good flow. RN unable to cannulate AVF with arterial and venous needle despite fistulogram.   s/p line removal by IR 01/07 for 24-48 hr line holiday; Plan for 8 weeks of Vanc with HD from discharge per ID recs. Vancomycin 500 mg IV after HD. Re-dose when the random level is less than 20    Type 2 diabetes mellitus treated with insulin  HbA1c 6.2, sugars controlled  Home DM regimen:  Largine 15 qHS  Hold scheduled insulin for now with AMS and decreased oral intake  SSI with POCT accuchecks to achieve blood glucose of 140-180 while hospitalized  Hypoglycemia protocol  Diabetic diet        Prophylactic use of anastrozole (Arimidex)  On Arimidex for history of breast cancer      Obesity, diabetes, and hypertension syndrome  Noted    Chronic pain  2/2 lumbar spondylosis  See above      Lumbar spondylosis  Chronic issue  Continue Cymbalta  Continue Robaxin TID PRN  Continue Norco prn      Class 1 obesity  due to excess calories with serious comorbidity in adult  Body mass index is 32.54 kg/m². Morbid obesity complicates all aspects of disease management from diagnostic modalities to treatment. Weight loss encouraged and health benefits explained to patient.         Major depressive disorder, recurrent, moderate  Patient has recurrent depression which is moderate and is currently controlled. Will Continue anti-depressant medications. We will not consult psychiatry at this time. Patient does not display psychosis at this time. Continue to monitor closely and adjust plan of care as needed.      Chronic and stable  Continue Cymbalta      Neurogenic bladder  Chronic issue  Has suprapubic cath, replaced 12/31  Continue Oxybutynin      Hyponatremia  Hyponatremia is likely due to volume overload from ESRD. The patient's most recent sodium results are listed below.  Recent Labs     01/09/25  0343 01/10/25  0230 01/11/25  0514   * 125* 127*     Plan  - Correct the sodium by 4-6mEq in 24 hours.   - Will treat the hyponatremia with HD  - Monitor sodium Daily.   - Patient hyponatremia is stable    1/9 sodium trended down to 123. . sodium trended down to 123. Fluid restriction 1L/24h . UF with goal 2L today after TDC placement.   1/10 sodium improved to 125.   1/11 sodium improved from  125 to 127.  Nephrology follow up - started lasix 80mg PO QDaily. Follow up with outpatient HD on 1/12.     Hyperlipidemia  Chronic and stable  Continue Lipitor    Hypothyroidism  Chronic and stable  Continue Synthroid  TSH 1.631        VTE Risk Mitigation (From admission, onward)           Ordered     apixaban tablet 5 mg  2 times daily         01/09/25 0747     heparin (porcine) injection 1,000 Units  As needed (PRN)         01/01/25 1610                    Discharge Planning   FLORENTINO: 1/12/2025     Code Status: Full Code   Medical Readiness for Discharge Date:   Discharge Plan A: Home Health (will need resumption order for [facility].  To  receive Vanc on dialysis days for 8 weeks post discharge)                        Milton Julian MD  Department of Hospital Medicine   [facility] - Internal Medicine Telemetry

## 2025-01-11 NOTE — PLAN OF CARE
[facility] - Internal Medicine Telemetry      HOME HEALTH ORDERS  FACE TO FACE ENCOUNTER    Patient Name: Cecile Bowen  YOB: 1950    PCP: Lurdes Navarro MD   PCP Address: [address] [facility] / [place] [address]  PCP Phone Number: [phone]  PCP Fax: [phone]    Encounter Date: 12/31/24    Admit to Home Health    Diagnoses:  Active Hospital Problems    Diagnosis  POA    *Severe sepsis [A41.9, R65.20]  Yes    HTN (hypertension) [I10]  Yes    Positive JASON (antinuclear antibody) [R76.8]  Yes    ESRD (end stage renal disease) on dialysis [N18.6, Z99.2]  Not Applicable    Pressure injury of right buttock, stage 3 [L89.313]  Yes    Anemia in ESRD (end-stage renal disease) [N18.6, D63.1]  Yes    Unspecified atrial fibrillation [I48.91]  Yes    Catheter-associated urinary tract infection [T83.511A, N39.0]  Yes    Bacteremia due to Enterococcus [R78.81, B95.2]  Yes    Personal history of malignant neoplasm of breast [Z85.3]  Not Applicable    Type 2 diabetes mellitus treated with insulin [E11.9, Z79.4]  Not Applicable    Prophylactic use of anastrozole (Arimidex) [Z79.811]  Not Applicable    Obesity, diabetes, and hypertension syndrome [E11.69, E66.9, E11.59, I15.2]  Yes    Chronic pain [G89.29]  Yes     Chronic    Lumbar spondylosis [M47.816]  Yes    Class 1 obesity due to excess calories with serious comorbidity in adult [E66.811, E66.09]  Yes    Major depressive disorder, recurrent, moderate [F33.1]  Yes    Neurogenic bladder [N31.9]  Yes     Chronic    Hyponatremia [E87.1]  Yes     Chronic    Hyperlipidemia [E78.5]  Yes    Hypothyroidism [E03.9]  Yes     Chronic      Resolved Hospital Problems    Diagnosis Date Resolved POA    Toxic encephalopathy [G92.9] 01/04/2025 Yes       Follow Up Appointments:  Future Appointments   Date Time Provider Department Center   1/13/2025 11:30 AM CHAIR 27, [facility] KIDNEY CARE [facility] Kidney [place]   1/15/2025 11:30 AM CHAIR Clement, [facility]NEY [facility] [place]   1/17/2025 11:30 AM CHAIR 27, [facility] KIDNEY CARE [facility] [place]   1/20/2025 11:30 AM CHAIR 27, [facility] [place]   1/22/2025 11:30 AM CHAIR 27, [facility] [place]   1/24/2025 11:30 AM CHAIR 27, [facility] [place]   1/27/2025  1:15 PM Ada Soni DPM [facility] POD [address]   1/27/2025  1:30 PM CHAIR 27, [facility] [place]   1/28/2025  1:15 PM RODRIGO Friedman III, MD [facility] [address]   1/29/2025 11:30 AM CHAIR 27, [facility] KIDNEY [facility] Kidney [place]   1/31/2025 11:30 AM CHAIR 27, [facility] KIDNEY CARE [facility] Kidney [place]   2/3/2025 11:30 AM CHAIR 27, [facility] KIDNEY CARE [facility] Kidney [place]   2/5/2025 11:30 AM CHAIR 27, [facility] [place]   2/7/2025 11:30 AM CHAIR 27, [facility] KIDNEY CARE [facility] Kidney [place]   2/10/2025 11:30 AM CHAIR 27, [facility] [place]   2/12/2025 11:30 AM CHAIR 27, [facility] [place]   2/14/2025 11:30 AM CHAIR 27, [facility] [place]   2/17/2025 11:30 AM CHAIR 27, [facility] [place]   2/19/2025 11:30 AM CHAIR 27, [facility] [place]   2/21/2025 11:30 AM CHAIR 27, [facility] [place]   2/24/2025 11:30 AM CHAIR 27, [facility] [place]   2/26/2025 11:30 AM CHAIR 27, [facility] [place]   2/28/2025 11:30 AM CHAIR 27, [facility] KIDNEY [facility] Kidney [place]   3/3/2025 11:30 AM CHAIR 27, [facility] KIDNEY [facility] Kidney [place]   3/5/2025 11:30 AM CHAIR 27, [facility] Kidney [place]   3/7/2025 11:30 AM CHAIR 27, [facility] Kidney [place]   3/10/2025 11:30 AM CHAIR 27, [facility]ney [place]   3/12/2025 11:30 AM CHAIR 27, [facility] Kidney [place]   3/14/2025 11:30 AM CHAIR 27, [facility] Kidney  [place]   3/17/2025 11:30 AM CHAIR 27, [facility] KIDNEY CARE [facility] Kidney [place]   3/19/2025 11:30 AM CHAIR 27, [facility] KIDNEY CARE [facility] Kidney [place]   3/21/2025 11:30 AM CHAIR 27, [facility] KIDNEY CARE [facility] Kidney [place]   3/24/2025 11:30 AM CHAIR 27, [facility] KIDNEY CARE [facility] Kidney [place]   3/26/2025 11:30 AM CHAIR 27, [facility] KIDNEY CARE [facility] Kidney [place]   3/28/2025 11:30 AM CHAIR 27, [facility] KIDNEY [facility] Kidney [place]   3/31/2025 11:30 AM CHAIR 27, [facility] KIDNEY [facility] Kidney [place]   4/2/2025 11:30 AM CHAIR 27, [facility] KIDNEY [facility] Kidney [place]   4/4/2025 11:30 AM CHAIR 27, [facility] KIDNEY [facility] Kidney [place]   4/7/2025 11:30 AM CHAIR 27, [facility] KIDNEY [facility] Kidney [place]   4/9/2025 11:30 AM Brooke Gabriel APRN, JOCELINEP [facility] IM [address] [facility]   4/9/2025 12:00 PM CHAIR 27, [facility] KIDNEY [facility] Kidney [place]   4/11/2025 11:30 AM CHAIR 27, [facility] KIDNEY [facility] Kidney [place]   4/14/2025 11:30 AM CHAIR 27, [facility] KIDNEY [facility] Kidney [place]   4/16/2025 11:30 AM CHAIR 27, [facility] KIDNEY [facility] Kidney [place]   4/18/2025 11:30 AM CHAIR 27, [facility] KIDNEY [facility] Kidney [place]   4/21/2025 11:30 AM CHAIR 27, [facility] KIDNEY CARE [facility] Kidney [place]   4/23/2025 11:30 AM CHAIR 27, [facility] KIDNEY [facility] Kidney [place]   4/25/2025 11:30 AM CHAIR 27, [facility] KIDNEY CARE [facility] Kidney [place]   4/28/2025 11:30 AM CHAIR 27, [facility] KIDNEY [facility] Kidney [place]   4/30/2025 11:30 AM CHAIR 27, [facility] KIDNEY [facility] Kidney [place]       Allergies:Review of patient's allergies indicates:  No Known Allergies    Medications: Review discharge medications with patient and family and provide education.    Current Facility-Administered Medications   Medication Dose Route Frequency Provider Last Rate Last Admin    acetaminophen tablet 650 mg  650 mg Oral Q4H PRN Fariha Abraham MD   650 mg at 01/06/25 8636    amLODIPine tablet 10 mg  10 mg Oral Daily Cole Leggett,  MD   10 mg at 01/11/25 0852    anastrozole tablet 1 mg  1 mg Oral Daily Fariha Abraham MD   1 mg at 01/11/25 0851    apixaban tablet 5 mg  5 mg Oral BID Milton Julian MD   5 mg at 01/11/25 0851    atorvastatin tablet 80 mg  80 mg Oral Daily Fariha Abraham MD   80 mg at 01/11/25 0851    carvediloL tablet 12.5 mg  12.5 mg Oral BID Fariha Abraham MD   12.5 mg at 01/11/25 0851    dextrose 50% injection 12.5 g  12.5 g Intravenous PRN Fariha Abraham MD        dextrose 50% injection 12.5 g  12.5 g Intravenous PRN Marcela Vicente MD        dextrose 50% injection 25 g  25 g Intravenous PRN Fariha Abraham MD        dextrose 50% injection 25 g  25 g Intravenous PRN Marcela Vicente MD        DULoxetine DR capsule 40 mg  40 mg Oral Daily Fariha Abraham MD   40 mg at 01/11/25 0852    epoetin clhoe-epbx injection 4,900 Units  50 Units/kg Intravenous Every Mon, Wed, Fri Mayra Porter, DNP, FNP-C   4,900 Units at 01/10/25 1247    furosemide tablet 80 mg  80 mg Oral Daily Milton Julian MD   80 mg at 01/11/25 1231    glucagon (human recombinant) injection 1 mg  1 mg Intramuscular PRN Fariha Abraham MD        glucagon (human recombinant) injection 1 mg  1 mg Intramuscular PRN aMrcela Vicente MD        glucose chewable tablet 16 g  16 g Oral PRN Fariha Abraham MD        glucose chewable tablet 16 g  16 g Oral PRN Marcela Vicente MD        glucose chewable tablet 24 g  24 g Oral PRN Fariha Abraham MD        glucose chewable tablet 24 g  24 g Oral PRN Marcela Vicente MD        heparin (porcine) injection 1,000 Units  1,000 Units Intra-Catheter PRN Jordyn Herrera PAJacquieC   1,000 Units at 01/10/25 1313    HYDROcodone-acetaminophen  mg per tablet 1 tablet  1 tablet Oral Q4H PRN Fariha Abraham MD   1 tablet at 01/11/25 0528    insulin aspart U-100 pen 1-10 Units  1-10 Units Subcutaneous QID (AC + HS) PRN Marcela Vicente MD   4 Units at  01/11/25 1153    ipratropium 42 mcg (0.06 %) nasal spray 2 spray  2 spray Each Nostril BID Fariha Abraham MD   2 spray at 01/11/25 0852    levothyroxine tablet 50 mcg  50 mcg Oral Before breakfast Fariha Abraham MD   50 mcg at 01/11/25 0600    losartan tablet 100 mg  100 mg Oral Daily Fariha Abraham MD   100 mg at 01/11/25 0852    methocarbamoL tablet 750 mg  750 mg Oral TID PRN Fariha Abraham MD   750 mg at 01/10/25 2100    naloxone 0.4 mg/mL injection 0.02 mg  0.02 mg Intravenous PRN Fariha Abraham MD        ondansetron injection 8 mg  8 mg Intravenous Q6H PRN Fariha Abraham MD   8 mg at 01/04/25 1635    oxybutynin 24 hr tablet 10 mg  10 mg Oral Daily Fariha Abraham MD   10 mg at 01/11/25 0852    polyethylene glycol packet 17 g  17 g Oral Daily PRN Fariha Abraham MD   17 g at 01/11/25 0850    sevelamer carbonate tablet 800 mg  800 mg Oral TID  Mayra Porter, DNP, FNP-C   800 mg at 01/11/25 1149    sodium bicarbonate tablet 650 mg  650 mg Oral Daily Fariha Abraham MD   650 mg at 01/11/25 0852    sodium chloride 0.9% flush 5 mL  5 mL Intravenous PRN Fariha Abraham MD        traZODone tablet 100 mg  100 mg Oral Nightly PRN Fariha Abraham MD   100 mg at 01/10/25 2100    vancomycin - pharmacy to dose   Intravenous pharmacy to manage frequency Cole Leggett MD         Facility-Administered Medications Ordered in Other Encounters   Medication Dose Route Frequency Provider Last Rate Last Admin    epoetin chloe injection 2,000 Units  2,000 Units Intravenous 1 time in Clinic/HOD Emmanuel Hare Jr., MD        heparin (porcine) injection 2,000 Units  2,000 Units Intravenous Once Emmanuel Hare Jr., MD        heparin (porcine) injection 2,000 Units  2,000 Units Intravenous Once Emmanuel Hare Jr., MD        heparin (porcine) injection 4,000 Units  4,000 Units Intra-Catheter 1 time in Clinic/HOD Emmanuel Hare Jr., MD        iron sucrose injection 100 mg  100 mg  "Intravenous 1 time in Clinic/HOD Emmanuel Hare Jr., MD               I have seen and examined this patient within the last 30 days. My clinical findings that support the need for the home health skilled services and home bound status are the following:no   Weakness/numbness causing balance and gait disturbance due to Weakness/Debility making it taxing to leave home.     Diet:   cardiac diet, diabetic diet 2000 calorie, and fluid restriction 1L/24h        Medication List        START taking these medications      0.9% NaCl PgBk 100 mL with vancomycin 500 mg SolR  Vancomycin random levels before HD  Vancomycin 500 mg IV x1 for after HD on monday, wednesday and friday  Re-dose when the random level is less than 20 mcg/mL (end date 3/12/25)            CHANGE how you take these medications      amLODIPine 10 MG tablet  Commonly known as: NORVASC  Take 1 tablet (10 mg total) by mouth once daily.  Start taking on: January 12, 2025  What changed: when to take this     furosemide 80 MG tablet  Commonly known as: LASIX  Take 1 tablet (80 mg total) by mouth once daily.  What changed:   medication strength  how much to take            CONTINUE taking these medications      AIMOVIG AUTOINJECTOR 140 mg/mL autoinjector  Generic drug: erenumab-aooe  140 mg MONTHLY (route: subcutaneous)     anastrozole 1 mg Tab  Commonly known as: ARIMIDEX  TAKE 1 TABLET BY MOUTH EVERY DAY     BD INSULIN SYRINGE ULTRA-FINE 0.5 mL 31 gauge x 5/16" Syrg  Generic drug: insulin syringe-needle U-100  USE WITH INSULIN 4 TIMES A DAY     calcium acetate(phosphat bind) 667 mg capsule  Commonly known as: PHOSLO     carvediloL 12.5 MG tablet  Commonly known as: COREG  Take 1 tablet (12.5 mg total) by mouth 2 (two) times daily.     diclofenac sodium 1 % Gel  Commonly known as: VOLTAREN     DULoxetine 20 MG capsule  Commonly known as: CYMBALTA  Take 2 capsules (40 mg total) by mouth once daily.     ELIQUIS 5 mg Tab  Generic drug: apixaban  TAKE 1 TABLET " "BY MOUTH TWICE A DAY     ergocalciferol 50,000 unit Cap  Commonly known as: ERGOCALCIFEROL  Take 1 capsule (50,000 Units total) by mouth every 7 days.     HYDROcodone-acetaminophen  mg per tablet  Commonly known as: NORCO  Take 1 tablet by mouth every 6 (six) hours as needed for Pain.     ipratropium 21 mcg (0.03 %) nasal spray  Commonly known as: ATROVENT  2 sprays by Each Nostril route 2 (two) times daily.     lactulose 20 gram/30 mL Soln  Commonly known as: CHRONULAC  Take 30 mLs (20 g total) by mouth every 12 (twelve) hours as needed (for constipation).     LANTUS SOLOSTAR U-100 INSULIN 100 unit/mL (3 mL) Inpn pen  Generic drug: insulin glargine U-100 (Lantus)  INJECT 12-15 UNITS UNDER THE SKIN at night     losartan 100 MG tablet  Commonly known as: COZAAR  Take 1 tablet (100 mg total) by mouth once daily.     methocarbamoL 750 MG Tab  Commonly known as: ROBAXIN  TAKE 1 TO 2 TABLETS(750 TO 1500 MG) BY MOUTH THREE TIMES DAILY AS NEEDED FOR MUSCLE SPASMS     oxybutynin 10 MG 24 hr tablet  Commonly known as: DITROPAN-XL  TAKE 1 TABLET BY MOUTH EVERY DAY     pen needle, diabetic 31 gauge x 5/16" Ndle  Commonly known as: BD ULTRA-FINE SHORT PEN NEEDLE  USE WITH LANTUS DAILY, e 11.65     sodium bicarbonate 650 MG tablet  Take 1 tablet (650 mg total) by mouth once daily.     sumatriptan 100 MG tablet  Commonly known as: IMITREX  Take 1 tablet (100 mg total) by mouth every 2 (two) hours as needed for Migraine (Limit 2 in 14 hours and 9 in one month).     SYNTHROID 50 mcg tablet  Generic drug: levothyroxine  TAKE 1 TABLET BY MOUTH BEFORE BREAKFAST. ADMINISTER ON AN EMPTY STOMACH AT LEAST 30 MINUTES BEFORE FOOD. IF RECEIVING TUBE FEEDS, HOLD TUBE FEEDS FOR 1 HOUR BEFORE AND AFTER LEVOTHYROXINE ADMINISTRATION.     traZODone 100 MG tablet  Commonly known as: DESYREL  TAKE 1 TABLET BY MOUTH NIGHTLY AS NEEDED FOR INSOMNIA.            STOP taking these medications      acetaminophen 325 MG tablet  Commonly known as: " TYLENOL     atorvastatin 80 MG tablet  Commonly known as: LIPITOR     ondansetron 8 MG Tbdl  Commonly known as: ZOFRAN-ODT     oxyCODONE 5 MG immediate release tablet  Commonly known as: ROXICODONE     silver sulfADIAZINE 1% 1 % cream  Commonly known as: SILVADENE     UNABLE TO FIND     VELPHORO 500 mg Chew  Generic drug: sucroferric oxyhydroxide     VELTASSA 16.8 gram Pwpk  Generic drug: patiromer calcium sorbitex                Referrals/ Consults  Physical Therapy to evaluate and treat. Evaluate for home safety and equipment needs; Establish/upgrade home exercise program. Perform / instruct on therapeutic exercises, gait training, transfer training, and Range of Motion.  Occupational Therapy to evaluate and treat. Evaluate home environment for safety and equipment needs. Perform/Instruct on transfers, ADL training, ROM, and therapeutic exercises.  Aide to provide assistance with personal care, ADLs, and vital signs.    Activities:   activity as tolerated    Nursing:   Agency to admit patient within 24 hours of hospital discharge unless specified on physician order or at patient request    SN to complete comprehensive assessment including routine vital signs. Instruct on disease process and s/s of complications to report to MD. Review/verify medication list sent home with the patient at time of discharge  and instruct patient/caregiver as needed. Frequency may be adjusted depending on start of care date.     Skilled nurse to perform up to 3 visits PRN for symptoms related to diagnosis    Notify MD if SBP > 160 or < 90; DBP > 90 or < 50; HR > 120 or < 50; Temp > 101; O2 < 88%; Other:   .5    Ok to schedule additional visits based on staff availability and patient request on consecutive days within the home health episode.    When multiple disciplines ordered:    Start of Care occurs on Sunday - Wednesday schedule remaining discipline evaluations as ordered on separate consecutive days following the start of  care.    Thursday SOC -schedule subsequent evaluations Friday and Monday the following week.     Friday - Saturday SOC - schedule subsequent discipline evaluations on consecutive days starting Monday of the following week.    For all post-discharge communication and subsequent orders please contact patient's primary care physician. If unable to reach primary care physician or do not receive response within 30 minutes, please contact [phone]  for clinical staff order clarification    Miscellaneous     suprapubic catheter care ( change every 30 days - last changed 12/31/24)    Routine Skin for Bedridden Patients: Instruct patient/caregiver to apply moisture barrier cream to all skin folds and wet areas in perineal area daily and after baths and all bowel movements.    Home Health Aide:  Nursing every 48 hours, Physical Therapy every 48 hours, Occupational Therapy every 48 hours, and Home Health Aide every 48 hours    Wound Care Orders  Moisture dermtatitis buttocks - Cleanse sacrum and bilateral buttocks with sterile normal saline and pat dry. Apply triad BID and PRN if soiled.    I certify that this patient is confined to her home and needs intermittent skilled nursing care, physical therapy, and occupational therapy.

## 2025-01-11 NOTE — SUBJECTIVE & OBJECTIVE
Interval History:   NAEON.  Na improved to 127 this morning after HD yesterday.  She is asymptomatic.    Hypervolemic hyponatremia 2/2 ESRD status.  She does make urine.  Hyponatremia is chronic, dating back to 2021.      Review of patient's allergies indicates:  No Known Allergies  Current Facility-Administered Medications   Medication Frequency    acetaminophen tablet 650 mg Q4H PRN    amLODIPine tablet 10 mg Daily    anastrozole tablet 1 mg Daily    apixaban tablet 5 mg BID    atorvastatin tablet 80 mg Daily    carvediloL tablet 12.5 mg BID    dextrose 50% injection 12.5 g PRN    dextrose 50% injection 12.5 g PRN    dextrose 50% injection 25 g PRN    dextrose 50% injection 25 g PRN    DULoxetine DR capsule 40 mg Daily    epoetin chloe-epbx injection 4,900 Units Every Mon, Wed, Fri    glucagon (human recombinant) injection 1 mg PRN    glucagon (human recombinant) injection 1 mg PRN    glucose chewable tablet 16 g PRN    glucose chewable tablet 16 g PRN    glucose chewable tablet 24 g PRN    glucose chewable tablet 24 g PRN    heparin (porcine) injection 1,000 Units PRN    HYDROcodone-acetaminophen  mg per tablet 1 tablet Q4H PRN    insulin aspart U-100 pen 1-10 Units QID (AC + HS) PRN    ipratropium 42 mcg (0.06 %) nasal spray 2 spray BID    levothyroxine tablet 50 mcg Before breakfast    losartan tablet 100 mg Daily    methocarbamoL tablet 750 mg TID PRN    naloxone 0.4 mg/mL injection 0.02 mg PRN    ondansetron injection 8 mg Q6H PRN    oxybutynin 24 hr tablet 10 mg Daily    polyethylene glycol packet 17 g Daily PRN    sevelamer carbonate tablet 800 mg TID WM    sodium bicarbonate tablet 650 mg Daily    sodium chloride 0.9% flush 5 mL PRN    traZODone tablet 100 mg Nightly PRN    vancomycin - pharmacy to dose pharmacy to manage frequency     Facility-Administered Medications Ordered in Other Encounters   Medication Frequency    epoetin chloe injection 2,000 Units 1 time in Clinic/HOD    heparin (porcine)  injection 2,000 Units Once    heparin (porcine) injection 2,000 Units Once    heparin (porcine) injection 4,000 Units 1 time in Clinic/HOD    iron sucrose injection 100 mg 1 time in Clinic/HOD       Objective:     Vital Signs (Most Recent):  Temp: 98.7 °F (37.1 °C) (01/11/25 1116)  Pulse: 71 (01/11/25 1116)  Resp: 18 (01/11/25 1116)  BP: (!) 143/65 (01/11/25 1116)  SpO2: 95 % (01/11/25 1116) Vital Signs (24h Range):  Temp:  [98.2 °F (36.8 °C)-98.9 °F (37.2 °C)] 98.7 °F (37.1 °C)  Pulse:  [68-87] 71  Resp:  [16-18] 18  SpO2:  [94 %-98 %] 95 %  BP: (117-165)/(61-85) 143/65     Weight: 97.1 kg (214 lb) (01/01/25 0538)  Body mass index is 32.54 kg/m².  Body surface area is 2.16 meters squared.    I/O last 3 completed shifts:  In: 1660 [P.O.:960; Other:700]  Out: 2550 [Urine:350; Other:2200]     Physical Exam  Vitals and nursing note reviewed.   Constitutional:       Appearance: She is ill-appearing.   HENT:      Head: Normocephalic.   Eyes:      Conjunctiva/sclera: Conjunctivae normal.   Cardiovascular:      Rate and Rhythm: Normal rate.      Arteriovenous access: Left arteriovenous access is present.     Comments: LUE AVF + thrill  Pulmonary:      Effort: Pulmonary effort is normal. No respiratory distress.   Abdominal:      Palpations: Abdomen is soft.      Tenderness: There is no abdominal tenderness.   Musculoskeletal:      Right lower leg: Edema present.      Left lower leg: Edema present.   Skin:     General: Skin is warm and dry.   Neurological:      Mental Status: She is alert.   Psychiatric:         Mood and Affect: Mood normal.          Significant Labs:  CBC:   Recent Labs   Lab 01/11/25 0514   WBC 6.03   RBC 3.03*   HGB 9.1*   HCT 28.0*      MCV 92   MCH 30.0   MCHC 32.5     CMP:   Recent Labs   Lab 01/11/25  0514   *   CALCIUM 8.0*   ALBUMIN 2.3*   PROT 5.6*   *   K 4.2   CO2 19*      BUN 14   CREATININE 1.7*   ALKPHOS 82   ALT 10   AST 11   BILITOT 0.3

## 2025-01-11 NOTE — PLAN OF CARE
Patient will discharge home via ambulance. Updated HH Orders have been sent to [facility]. Transport is scheduled for 4:30 pm     01/11/25 1255   Final Note   Assessment Type Final Discharge Note   Anticipated Discharge Disposition Home-Health   What phone number can be called within the next 1-3 days to see how you are doing after discharge? [phone]   Hospital Resources/Appts/Education Provided Appointments scheduled and added to AVS   Post-Acute Status   Post-Acute Authorization Home Health   Home Health Status Set-up Complete/Auth obtained   Coverage [employer]   Discharge Delays None known at this time     [facility] - Internal Medicine Telemetry  Discharge Final Note    Primary Care Provider: Lurdes Navarro MD    Expected Discharge Date: 1/11/2025    Final Discharge Note (most recent)       Final Note - 01/11/25 1255          Final Note    Assessment Type Final Discharge Note (P)      Anticipated Discharge Disposition Home-Health Care Svc (P)      What phone number can be called within the next 1-3 days to see how you are doing after discharge? [phone] (P)      Hospital Resources/Appts/Education Provided Appointments scheduled and added to AVS (P)         Post-Acute Status    Post-Acute Authorization Home Health (P)      Home Health Status Set-up Complete/Auth obtained (P)      Coverage [employer] (P)      Discharge Delays None known at this time (P)                      Important Message from Medicare

## 2025-01-13 DIAGNOSIS — Z00.00 ENCOUNTER FOR MEDICARE ANNUAL WELLNESS EXAM: ICD-10-CM

## 2025-01-14 ENCOUNTER — PATIENT OUTREACH (OUTPATIENT)
Dept: ADMINISTRATIVE | Facility: CLINIC | Age: 75
End: 2025-01-14
Payer: MEDICARE

## 2025-01-14 ENCOUNTER — PATIENT MESSAGE (OUTPATIENT)
Dept: ADMINISTRATIVE | Facility: CLINIC | Age: 75
End: 2025-01-14
Payer: MEDICARE

## 2025-01-14 NOTE — PROGRESS NOTES
C3 nurse spoke with Cecile Bowen for a TCC post hospital discharge follow up call. The patient has a scheduled HOSFU appointment with Darshan Friedman MD on 01/16/25 @ 1300.

## 2025-01-14 NOTE — PROGRESS NOTES
2nd attempt made to reach patient for TCC call. Pt is unable to conduct TCC call at this time and requested a call back around 4pm.

## 2025-01-14 NOTE — PROGRESS NOTES
C3 nurse attempted to contact Cecile Bowen for a TCC post hospital discharge follow up call. No answer. Left voicemail with callback information. The patient does not have a scheduled HOSFU appointment. Message sent to PCP staff for assistance with scheduling visit with patient.

## 2025-01-15 ENCOUNTER — TELEPHONE (OUTPATIENT)
Dept: INTERNAL MEDICINE | Facility: CLINIC | Age: 75
End: 2025-01-15
Payer: MEDICARE

## 2025-01-15 NOTE — TELEPHONE ENCOUNTER
FYI called and spoke to pt in regards to rash pt was already schedule a Hospital follow up with you on 1/21/25

## 2025-01-23 DIAGNOSIS — Z99.2 ESRD (END STAGE RENAL DISEASE) ON DIALYSIS: Primary | ICD-10-CM

## 2025-01-23 DIAGNOSIS — N18.6 ESRD (END STAGE RENAL DISEASE) ON DIALYSIS: Primary | ICD-10-CM

## 2025-01-25 DIAGNOSIS — N32.89 BLADDER SPASMS: ICD-10-CM

## 2025-01-26 RX ORDER — OXYBUTYNIN CHLORIDE 10 MG/1
10 TABLET, EXTENDED RELEASE ORAL
Qty: 90 TABLET | Refills: 4 | Status: SHIPPED | OUTPATIENT
Start: 2025-01-26

## 2025-01-28 ENCOUNTER — HOSPITAL ENCOUNTER (OUTPATIENT)
Dept: VASCULAR SURGERY | Facility: CLINIC | Age: 75
Discharge: HOME | End: 2025-01-28
Attending: SURGERY
Payer: MEDICARE

## 2025-01-28 ENCOUNTER — OFFICE VISIT (OUTPATIENT)
Dept: VASCULAR SURGERY | Facility: CLINIC | Age: 75
End: 2025-01-28
Payer: MEDICARE

## 2025-01-28 VITALS — SYSTOLIC BLOOD PRESSURE: 138 MMHG | TEMPERATURE: 98 F | DIASTOLIC BLOOD PRESSURE: 63 MMHG | HEART RATE: 81 BPM

## 2025-01-28 DIAGNOSIS — Z99.2 ESRD (END STAGE RENAL DISEASE) ON DIALYSIS: Primary | ICD-10-CM

## 2025-01-28 DIAGNOSIS — Z99.2 ESRD (END STAGE RENAL DISEASE) ON DIALYSIS: ICD-10-CM

## 2025-01-28 DIAGNOSIS — N18.6 ESRD (END STAGE RENAL DISEASE) ON DIALYSIS: ICD-10-CM

## 2025-01-28 DIAGNOSIS — N18.6 ESRD (END STAGE RENAL DISEASE) ON DIALYSIS: Primary | ICD-10-CM

## 2025-01-28 PROCEDURE — 99999 PR PBB SHADOW E&M-EST. PATIENT-LVL IV: CPT | Mod: PBBFAC,HCNC,, | Performed by: SURGERY

## 2025-01-28 PROCEDURE — 93990 DOPPLER FLOW TESTING: CPT | Mod: HCNC,S$GLB,, | Performed by: SURGERY

## 2025-01-28 NOTE — PROGRESS NOTES
VASCULAR SURGERY SERVICE    REFERRING DOCTOR: Lavinia Nieto NP     CHIEF COMPLAINT:  End-stage renal disease    HISTORY OF PRESENT ILLNESS: Cecile Bowen is a 74 y.o. female with prior simple mastectomy on the left, sentinel node biopsy, no axillary node dissection, with end-stage renal disease times 3 months via right IJ PermCath who is sent for permanent dialysis access.  She is currently dialyzing only Mondays and Fridays.    She is right-handed.  She has no history of AICD or pacemaker placement.  She does have known bilateral carpal tunnel syndrome with the occasional numbness in her hands but has not had a release.    She takes Eliquis 5 mg but only once a day because of easy bruising.  She is on clear why she is on Eliquis    Finally she takes chronic narcotics for back pain    S/p    1a.  Diagnostic left arm arteriogram 09/19/2024  1/ trans radial PTA, left AV fistula 04/29/2024  2. left brachiocephalic AV fistula creation 02/14/2024      10/15/2024:   She now returns after diagnostic arteriogram of the left arm to evaluate for steal.  In the interim she has also started to cannulate her AV fistula has used exclusively for 2 weeks.  She is now complaining of left hand pain at night over the last 7-10 days which was not prior before.  Notably the small ulceration she had had a nailbed have healed however.  She does have prior carpal tunnel on this side and wears a chronic brace    12/03/2024:  A banding procedure but had to cancel because of transportation issues.  Notably however her intermittent hand pain has resolved and she now only having fingertip numbness.  All ulceration fingers also healed.  She was using the fistula essentially exclusively but 2-3 days ago had a episode of extravasation    12/31/2024:  She returns to potentially to remove her PermCath.  , she reports using the PermCath only yesterday.  On presentation here she is noted to be tachycardic with a pulse of 104 with a  temperature of 100.9°, the patient is quite somnolent although can answer questions appropriately then nods off sometimes during mid sentence.  This is not her baseline    01/28/2025:  As per my last note when I saw her a month ago she was septic and sent to the ED and subsequently admitted.  During that hospitalization they change her PermCath.  She says he has been using her PermCath only for the last 3-4 weeks    Past Medical History:   Diagnosis Date    Cervicogenic migraine     Chronic pain     CKD (chronic kidney disease) stage 4, GFR 15-29 ml/min     [facility]    CKD (chronic kidney disease) stage 4, GFR 15-29 ml/min     Diabetes mellitus     Long term use of Insulin, Diabetic Neuropathy    Dialysis patient 1/21/2022    Fibromyalgia     Hydronephrosis     Hyperlipidemia     Hypertension 12/12/2012    Hypothyroidism 12/12/2012    ARON (iron deficiency anemia)     Insomnia     Levoscoliosis     Malignant neoplasm of upper-outer quadrant of left breast in female, estrogen receptor positive     Metabolic acidosis     Mobility impaired     Nuclear sclerosis - Both Eyes 3/24/2014    VIJAYA (obstructive sleep apnea)     Osteopenia     Pulmonary nodule     Recurrent UTI     Renal manifestation of secondary diabetes mellitus     Secondary hyperparathyroidism, renal     Urinary retention        Past Surgical History:   Procedure Laterality Date    ANGIOGRAPHY OF UPPER EXTREMITY N/A 9/19/2024    Procedure: Angiogram Extremity Bilateral;  Surgeon: RODRIGO Friedman III, MD;  Location: [facility] OR [address] [place] [address];  Service: Vascular;  Laterality: N/A;  R femoral approach, arch & arm angiogram  168.76mgy  33.1145 gycm2  8.9min    AV FISTULA PLACEMENT Left 2/14/2024    Procedure: CREATION, AV FISTULA;  Surgeon: RODRIGO Friedman III, MD;  Location: [facility] OR [address] [place] [address];  Service: Vascular;  Laterality: Left;  LUE AVF creation vs AVG insertion    BIOPSY OF URETER Left 2/18/2022    Procedure: BIOPSY, URETER;  Surgeon: Ronel Whittington MD;   Location: [facility] OR 1ST FLR;  Service: Urology;  Laterality: Left;    BREAST BIOPSY Right     benign    CHOLECYSTECTOMY      COLONOSCOPY N/A 1/13/2017    Procedure: COLONOSCOPY;  Surgeon: Morris Wiseman MD;  Location: [facility] ENDO (4TH FLR);  Service: Endoscopy;  Laterality: N/A;  Renal pt Nephrology advised to avoid phosphate preps    COLONOSCOPY N/A 12/7/2023    Procedure: COLONOSCOPY;  Surgeon: Herve Allen MD;  Location: [facility] ENDO (2ND FLR);  Service: Endoscopy;  Laterality: N/A;  HD MWF; labs prior  suprapubic catheter  pt does not ambulate-uses christina lift- will have sling with her  9/7 ref. by Anastasiia Campbell DO, PEG, instr. mailed, Eliquis hold approval pending-[facility] to hold Eliquis 2 days per Dr Navarro-GT  1/30-precall complete-MS    CYSTOSCOPY N/A 10/8/2018    Procedure: CYSTOSCOPY;  Surgeon: Ronel Whittington MD;  [facility];  Service: Urology;  Laterality: N/A;  45 min    CYSTOSCOPY N/A 3/25/2019    Procedure: CYSTOSCOPY;  Surgeon: Ronel Whittington MD;  [facility];  Service: Urology;  Laterality: N/A;  45 min    CYSTOSCOPY N/A 8/26/2019    Procedure: CYSTOSCOPY;  Surgeon: Ronel Whittington MD;  [facility];  Service: Urology;  Laterality: N/A;  45 min    CYSTOSCOPY N/A 7/2/2021    Procedure: CYSTOSCOPY;  Surgeon: Ronel Whittington MD;  [facility];  Service: Urology;  Laterality: N/A;    CYSTOSCOPY  12/23/2021    Procedure: CYSTOSCOPY;  Surgeon: Ronel Whittington MD;  [facility];  Service: Urology;;    CYSTOSCOPY  2/18/2022    Procedure: CYSTOSCOPY;  Surgeon: Ronel Whittington MD;  [facility];  Service: Urology;;    CYSTOSCOPY W/ URETERAL STENT PLACEMENT Left 5/15/2021    Procedure: CYSTOSCOPY, WITH URETERAL STENT INSERTION;  Surgeon: Levy Sánchez Jr., MD;  Location: [facility] OR [address] [place] [address];  Service: Urology;  Laterality: Left;    CYSTOSCOPY WITH BIOPSY OF BLADDER N/A 1/27/2020     Procedure: CYSTOSCOPY, WITH BLADDER BIOPSY, WITH FULGURATION IF INDICATED;  Surgeon: Ronel Whittington MD;  Location: [facility] OR [address] [place] [address];  Service: Urology;  Laterality: N/A;    DILATION AND CURETTAGE OF UTERUS      FISTULOGRAM N/A 4/29/2024    Procedure: Fistulogram;  Surgeon: RODRIGO Friedman III, MD;  Location: [facility] CATH LAB;  Service: Peripheral Vascular;  Laterality: N/A;    FISTULOGRAM Left 1/6/2025    Procedure: Fistulogram;  Surgeon: RODRIGO Friedman III, MD;  Location: [facility] CATH LAB;  Service: Peripheral Vascular;  Laterality: Left;    GALLBLADDER SURGERY  2006    HYSTERECTOMY      INJECTION FOR SENTINEL NODE IDENTIFICATION Left 8/1/2019    Procedure: INJECTION, FOR SENTINEL NODE IDENTIFICATION;  Surgeon: Samia Fulton MD;  Location: [facility] OR [address] [place] [address];  Service: General;  Laterality: Left;    INJECTION OF BOTULINUM TOXIN TYPE A N/A 10/8/2018    Procedure: INJECTION, BOTULINUM TOXIN, TYPE A 300 UNITS;  Surgeon: Ronel Whittington MD;  Location: [address];  Service: Urology;  Laterality: N/A;    INJECTION OF BOTULINUM TOXIN TYPE A N/A 3/25/2019    Procedure: INJECTION, BOTULINUM TOXIN, TYPE A 300 UNITS;  Surgeon: Ronel Whittington MD;  Location: [address];  Service: Urology;  Laterality: N/A;    INJECTION OF BOTULINUM TOXIN TYPE A N/A 8/26/2019    Procedure: INJECTION, BOTULINUM TOXIN, TYPE A 300 UNITS;  Surgeon: Ronel Whittington MD;  Location: [address];  Service: Urology;  Laterality: N/A;    MASTECTOMY Left 8/1/2019    Procedure: MASTECTOMY 23 hour stay;  Surgeon: Samia Fulton MD;  Location: [facility] OR [address] [place] [address];  Service: General;  Laterality: Left;    MEDIPORT REMOVAL Right 6/24/2022    Procedure: REMOVAL, CATHETER, CENTRAL VENOUS, TUNNELED, WITH PORT;  Surgeon: Isauro Anderson MD;  Location: [facility] CATH LAB;  Service: Radiology;  Laterality: Right;    OVARIAN CYST SURGERY  1985    PERCUTANEOUS TRANSLUMINAL ANGIOPLASTY OF ARTERIOVENOUS FISTULA Left 4/29/2024     "Procedure: PTA, AV FISTULA;  Surgeon: RODRIGO Friedman III, MD;  Location: [facility] CATH LAB;  Service: Peripheral Vascular;  Laterality: Left;    REPLACEMENT OF STENT Left 12/23/2021    Procedure: REPLACEMENT, STENT;  Surgeon: Ronel Whittington MD;  Location: [facility] OR 1ST FLR;  Service: Urology;  Laterality: Left;    REPLACEMENT OF STENT Left 2/18/2022    Procedure: REPLACEMENT, STENT;  Surgeon: Ronel Whittington MD;  Location: [facility] OR 1ST FLR;  Service: Urology;  Laterality: Left;    RETROGRADE PYELOGRAPHY Left 2/18/2022    Procedure: PYELOGRAM, RETROGRADE;  Surgeon: Ronel Whittington MD;  Location: [facility] OR [facility] FLR;  Service: Urology;  Laterality: Left;    SENTINEL LYMPH NODE BIOPSY Left 8/1/2019    Procedure: BIOPSY, LYMPH NODE, SENTINEL;  Surgeon: Samia Fulton MD;  Location: [facility] OR 2ND FLR;  Service: General;  Laterality: Left;    spt placement      TONSILLECTOMY, ADENOIDECTOMY      TOTAL ABDOMINAL HYSTERECTOMY W/ BILATERAL SALPINGOOPHORECTOMY  1985    URETEROSCOPY Left 2/18/2022    Procedure: URETEROSCOPY;  Surgeon: Ronel Whittington MD;  [facility];  Service: Urology;  Laterality: Left;         Current Outpatient Medications:     0.9% NaCl PgBk 100 mL with vancomycin 500 mg SolR, Vancomycin random levels before HD Vancomycin 500 mg IV x1 for after HD on monday, wednesday and friday Re-dose when the random level is less than 20 mcg/mL, Disp: , Rfl:     amLODIPine (NORVASC) 10 MG tablet, TAKE 1 TABLET BY MOUTH EVERY DAY, Disp: 90 tablet, Rfl: 3    anastrozole (ARIMIDEX) 1 mg Tab, TAKE 1 TABLET BY MOUTH EVERY DAY, Disp: 90 tablet, Rfl: 2    BD INSULIN SYRINGE ULTRA-FINE 0.5 mL 31 gauge x 5/16" Syrg, USE WITH INSULIN 4 TIMES A DAY, Disp: 100 each, Rfl: 12    calcium acetate,phosphat bind, (PHOSLO) 667 mg capsule, Take 667 mg by mouth 3 (three) times daily with meals., Disp: , Rfl:     carvediloL (COREG) 12.5 MG tablet, Take 1 tablet (12.5 mg total) by mouth 2 (two) times daily., " "Disp: 180 tablet, Rfl: 3    diclofenac sodium (VOLTAREN) 1 % Gel, Apply 2 g topically as needed., Disp: , Rfl:     DULoxetine (CYMBALTA) 20 MG capsule, Take 2 capsules (40 mg total) by mouth once daily., Disp: 180 capsule, Rfl: 1    ELIQUIS 5 mg Tab, TAKE 1 TABLET BY MOUTH TWICE A DAY (Patient taking differently: Take 5 mg by mouth Daily. Pt takes once a day), Disp: 180 tablet, Rfl: 3    erenumab-aooe (AIMOVIG AUTOINJECTOR) 140 mg/mL autoinjector, 140 mg MONTHLY (route: subcutaneous), Disp: 1 each, Rfl: 11    ergocalciferol (ERGOCALCIFEROL) 50,000 unit Cap, Take 1 capsule (50,000 Units total) by mouth every 7 days., Disp: 12 capsule, Rfl: 3    furosemide (LASIX) 80 MG tablet, Take 1 tablet (80 mg total) by mouth once daily., Disp: 30 tablet, Rfl: 11    HYDROcodone-acetaminophen (NORCO)  mg per tablet, Take 1 tablet by mouth every 6 (six) hours as needed for Pain., Disp: 120 tablet, Rfl: 0    ipratropium (ATROVENT) 21 mcg (0.03 %) nasal spray, 2 sprays by Each Nostril route 2 (two) times daily. (Patient taking differently: 2 sprays by Each Nostril route 2 (two) times daily as needed for Rhinitis.), Disp: 30 mL, Rfl: 0    lactulose (CHRONULAC) 20 gram/30 mL Soln, Take 30 mLs (20 g total) by mouth every 12 (twelve) hours as needed (for constipation)., Disp: 900 mL, Rfl: 0    LANTUS SOLOSTAR U-100 INSULIN 100 unit/mL (3 mL) InPn pen, INJECT 12-15 UNITS UNDER THE SKIN at night, Disp: 15 mL, Rfl: 3    losartan (COZAAR) 100 MG tablet, Take 1 tablet (100 mg total) by mouth once daily., Disp: 90 tablet, Rfl: 3    methocarbamoL (ROBAXIN) 750 MG Tab, TAKE 1 TO 2 TABLETS(750 TO 1500 MG) BY MOUTH THREE TIMES DAILY AS NEEDED FOR MUSCLE SPASMS, Disp: 180 tablet, Rfl: 1    oxybutynin (DITROPAN-XL) 10 MG 24 hr tablet, TAKE 1 TABLET BY MOUTH EVERY DAY, Disp: 90 tablet, Rfl: 4    pen needle, diabetic (BD ULTRA-FINE SHORT PEN NEEDLE) 31 gauge x 5/16" Ndle, USE WITH LANTUS DAILY, e 11.65, Disp: 100 each, Rfl: 3    sodium " bicarbonate 650 MG tablet, Take 1 tablet (650 mg total) by mouth once daily., Disp: 30 tablet, Rfl: 11    sumatriptan (IMITREX) 100 MG tablet, Take 1 tablet (100 mg total) by mouth every 2 (two) hours as needed for Migraine (Limit 2 in 14 hours and 9 in one month)., Disp: 30 tablet, Rfl: 1    SYNTHROID 50 mcg tablet, TAKE 1 TABLET BY MOUTH BEFORE BREAKFAST. ADMINISTER ON AN EMPTY STOMACH AT LEAST 30 MINUTES BEFORE FOOD. IF RECEIVING TUBE FEEDS, HOLD TUBE FEEDS FOR 1 HOUR BEFORE AND AFTER LEVOTHYROXINE ADMINISTRATION., Disp: 90 tablet, Rfl: 2    traZODone (DESYREL) 100 MG tablet, TAKE 1 TABLET BY MOUTH NIGHTLY AS NEEDED FOR INSOMNIA., Disp: 90 tablet, Rfl: 2  No current facility-administered medications for this visit.    Facility-Administered Medications Ordered in Other Visits:     epoetin chloe injection 2,000 Units, 2,000 Units, Intravenous, 1 time in Clinic/[facility], Emmanuel Hare Jr., MD    heparin (porcine) injection 2,000 Units, 2,000 Units, Intravenous, Once, Emmanuel Hare Jr., MD    heparin (porcine) injection 2,000 Units, 2,000 Units, Intravenous, Once, Emmanuel Hare Jr., MD    heparin (porcine) injection 4,000 Units, 4,000 Units, Intra-Catheter, 1 time in Clinic/[facility], Emmanuel Hare Jr., MD    iron sucrose injection 100 mg, 100 mg, Intravenous, 1 time in Clinic/[facility], Emmanuel Hare Jr., MD    Review of patient's allergies indicates:  No Known Allergies    Family History   Problem Relation Name Age of Onset    Diabetes Sister Kat     Kidney disease Sister Kat         CKD III    ALS Mother          d.    Cancer Maternal Grandmother          d. colon    Cancer Paternal Grandfather          d. lung    Scoliosis Brother [place]         increased pain    Prostate cancer Brother [place]         cured s/p surgery    Cancer Brother [place]         rare cancer that got into the bones    Diabetes Maternal Aunt      Kidney disease Maternal Aunt      Diabetes Maternal Uncle      Amblyopia Neg Hx       Blindness Neg Hx      Cataracts Neg Hx      Glaucoma Neg Hx      Macular degeneration Neg Hx      Retinal detachment Neg Hx      Strabismus Neg Hx         Social History     Tobacco Use    Smoking status: Never    Smokeless tobacco: Never   Substance Use Topics    Alcohol use: No     Alcohol/week: 0.0 standard drinks of alcohol    Drug use: No     PHYSICAL EXAM:   /63 (BP Location: Right arm, Patient Position: Sitting)   Pulse 81   Temp 98.1 °F (36.7 °C) (Oral)   Constitutional:  Patient is quite alert today  In no distress.  Traveling by wheelchair   Neurological: Normal speech  no focal findings  CN II - XII grossly intact.    Psychiatric: Mood and affect appropriate and symmetric.   HEENT: Normocephalic / atraumatic  PERRLA  Midline trachea  No scars across the neck   Cardiac: Regular rate and rhythm.   Pulmonary: Normal pulmonary effort.   Abdomen: Soft, not distended.     Skin: Warm and well perfused.    Vascular:  Radial pulse on the left today is trace 1+ palpable, although it clearly stronger with fistula compression   Extremities/  Musculoskeletal: Left arm:  Left brachiocephalic AV fistula has a soft thrill absolutely no pulsatility    Left hand 5/5  strength.     2024      IMAGIN/3/24 Duplex of the fistula shows a velocity shift 506 para anastomotic region but no B-mode stenosis.  Flow volume is 1.46 L (prior 1.9 L flow volume of 1.9 L.    Prior Left finger PPG is are completely flat in all digits.  This does improve somewhat with fistula compression  Duplex of the fistula demonstrates no stenosis, flow volume of 1.9 L  Diameter 8.9 proximal, 8.3 mid, 7.6 distal  Depth 4.1 proximal, 5.9 minute, 6.5 distal      IMPRESSION:   Patent left AV fistula, should be usable  Improved vascular steal.  Patient was not having any hand pain now.  We will not reschedule the banding procedure.    Narcotic dependence for chronic back pain.  4.  Nonambulatory status x1 year  5.  On chronic  Eliquis, evidently for atrial fibrillation diagnosed in 2021.  By clinical exam she does not feel that she is in AFib.  She was unaware of why she is on the Eliquis       PLAN:    May begin cannulation tomorrow 01/29/2025  Follow up in 4 weeks for PermCath removal if using the fistula exclusively  Hold Eliquis for 2 days prior to clinic visit    WALE Friedman III, MD, FACS  Professor and Chief, Vascular and Endovascular Surgery

## 2025-02-05 ENCOUNTER — DOCUMENT SCAN (OUTPATIENT)
Dept: HOME HEALTH SERVICES | Facility: HOSPITAL | Age: 75
End: 2025-02-05
Payer: MEDICARE

## 2025-02-06 ENCOUNTER — OFFICE VISIT (OUTPATIENT)
Dept: INTERNAL MEDICINE | Facility: CLINIC | Age: 75
End: 2025-02-06
Payer: MEDICARE

## 2025-02-06 ENCOUNTER — TELEPHONE (OUTPATIENT)
Dept: INTERNAL MEDICINE | Facility: CLINIC | Age: 75
End: 2025-02-06

## 2025-02-06 VITALS
WEIGHT: 213.88 LBS | RESPIRATION RATE: 16 BRPM | HEIGHT: 68 IN | SYSTOLIC BLOOD PRESSURE: 130 MMHG | HEART RATE: 78 BPM | BODY MASS INDEX: 32.41 KG/M2 | TEMPERATURE: 99 F | OXYGEN SATURATION: 96 % | DIASTOLIC BLOOD PRESSURE: 72 MMHG

## 2025-02-06 DIAGNOSIS — N18.6 ESRD (END STAGE RENAL DISEASE): ICD-10-CM

## 2025-02-06 DIAGNOSIS — I77.0 AVF (ARTERIOVENOUS FISTULA): ICD-10-CM

## 2025-02-06 DIAGNOSIS — Z09 HOSPITAL DISCHARGE FOLLOW-UP: Primary | ICD-10-CM

## 2025-02-06 PROCEDURE — 1126F AMNT PAIN NOTED NONE PRSNT: CPT | Mod: HCNC,CPTII,GC,S$GLB

## 2025-02-06 PROCEDURE — 1159F MED LIST DOCD IN RCRD: CPT | Mod: HCNC,CPTII,GC,S$GLB

## 2025-02-06 PROCEDURE — 3075F SYST BP GE 130 - 139MM HG: CPT | Mod: HCNC,CPTII,GC,S$GLB

## 2025-02-06 PROCEDURE — 99214 OFFICE O/P EST MOD 30 MIN: CPT | Mod: HCNC,GC,S$GLB,

## 2025-02-06 PROCEDURE — 3066F NEPHROPATHY DOC TX: CPT | Mod: HCNC,CPTII,GC,S$GLB

## 2025-02-06 PROCEDURE — 1111F DSCHRG MED/CURRENT MED MERGE: CPT | Mod: HCNC,CPTII,GC,S$GLB

## 2025-02-06 PROCEDURE — 1101F PT FALLS ASSESS-DOCD LE1/YR: CPT | Mod: HCNC,CPTII,GC,S$GLB

## 2025-02-06 PROCEDURE — 3288F FALL RISK ASSESSMENT DOCD: CPT | Mod: HCNC,CPTII,GC,S$GLB

## 2025-02-06 PROCEDURE — 3044F HG A1C LEVEL LT 7.0%: CPT | Mod: HCNC,CPTII,GC,S$GLB

## 2025-02-06 PROCEDURE — 3072F LOW RISK FOR RETINOPATHY: CPT | Mod: HCNC,CPTII,GC,S$GLB

## 2025-02-06 PROCEDURE — 99999 PR PBB SHADOW E&M-EST. PATIENT-LVL V: CPT | Mod: PBBFAC,HCNC,GC,

## 2025-02-06 PROCEDURE — 3078F DIAST BP <80 MM HG: CPT | Mod: HCNC,CPTII,GC,S$GLB

## 2025-02-06 RX ORDER — LANOLIN ALCOHOL/MO/W.PET/CERES
400 CREAM (GRAM) TOPICAL DAILY
Qty: 30 TABLET | Refills: 0 | Status: SHIPPED | OUTPATIENT
Start: 2025-02-06

## 2025-02-06 NOTE — TELEPHONE ENCOUNTER
Called and assisted pt with scheduling appt on 2/18/25 at 1:30 pm 6 month follow up with . Pt vu that she will see  as well.

## 2025-02-06 NOTE — PROGRESS NOTES
Subjective:       Patient ID: [id number]    Chief Complaint:   Chief Complaint   Patient presents with    Follow-up     Hospital f/u       Cecile Bowen is a 74 y.o. female with HTN, ESRD (HD), chronic pain, neurogenic bladder 2/2 DM s/p suprapubic catheter that presents for hospital f/u. Patient's PCP is Dr. Cancino. Patient is alone during this visit.    Transitional Care Note    Family and/or Caretaker present at visit?  No.  Diagnostic tests reviewed/disposition: I have reviewed all completed as well as pending diagnostic tests at the time of discharge.  Disease/illness education: performed  Home health/community services discussion/referrals: Patient has home health established at [facility], PT/OT .   Establishment or re-establishment of referral orders for community resources: No other necessary community resources.   Discussion with other health care providers: Discussed w Dr. Cain    [place] [facility] [place], HPI and Hospital course below:    HPI: Ms. Cecile Bowen is a 74 y.o. female with ESRD on HD, T2DM, afib on Eliquis, HTN, obesity, history of breast cancer, chronic suprapubic catheter for neurogenic bladder and chronic back pain who [place] [facility] [place] from Vascular Surgery for evaluation of fever and AMS.  History is obtained from patient.  She reports that over the last few days, she has been having fever and lethargy.  She went to see her Vascular Surgeon for right IJ PermCath removal, as her fistula has matured.  She is currently dialyzing only Mondays and Fridays via right IJ PermCath.  However, upon arrival to clinic she was febrile to 100.9F, slow to respond and tachy to 104.  She was sent to the ER for further evaluation.  At baseline, she uses a wheelchair.  She lives with her sister.  She currently endorses back pain that is not escalated from normal.  She is full code.  She took all of her medications prior to going to clinic except her Eliquis.     Upon arrival to the  ER, vitals were temp 101.7F, , /77.  Labs showed WBC 20, Hgb 11.3, Sodium 129, BUN/Cr 28/3.2, Lactic 0.8.  UA was dirty.  Suprapubic catheter was exchanged in the ED.  CXR was negative.  She was given Vanc and Zosyn was admitted to Hospital Medicine for further management.     Hospital Course:   Ms. Bowen is a 74 y.o. female with ESRD on HD, T2DM, afib on Eliquis, HTN, obesity, history of breast cancer, chronic suprapubic catheter for neurogenic bladder and chronic back pain who was admitted for severe sepsis 2/2 Enterococcus bacteremia and suprapubic UTI.  She was started on Zosyn.  After her blood cx returned positive, ID was consulted and she was started on Dapto for Enterococcus. Switched to Vanc . Plan for 8 weeks of Vanc with HD from discharge Urine cx returned with ESBL Klebsiella and Proteus, unclear if this is a UTI or just colonization. S/p one dose of gentamicin per ID Had issues with AVF for HD during stay. Vascular surgery was consulted s/p fistulogram 1/6 with good flow. RN unable to cannulate AVF with arterial and venous needle despite fistulogram  s/p line removal by IR today for 24-48 hr line holiday; Plan for TDC placement by IR in am NPO after MN will need OP follow up with Vascular     1/9 Severe sepsis 2/2 Enterococcus bacteremia and suprapubic UTI.   ID was consulted and she was started on Dapto for Enterococcus. Switched to Vancomycin .  Had issues with AVF for HD during stay. Vascular surgery was consulted s/p fistulogram 1/6 with good flow. RN unable to cannulate AVF with arterial and venous needle despite fistulogram.   s/p line removal by IR 01/07 for 24-48 hr line holiday; Plan for 8 weeks of Vanc with HD from discharge per ID recs.   Vancomycin 500 mg IV after HD. Re-dose when the random level is less than 20. s/p DC placement by IR today. HD today after TDC placement. sodium trended down to 123. Fluid restriction 1L/24h . UF with goal 2L today. needs HD today after TDC  placement. hold eliquis today for TDC.  needs OP follow up with Vascular surgery   1/10 sodium 125 .HD today   1/11 sats 98% on RA. sodium improved from  125 to 127.  Nephrology follow up - started lasix 80mg PO QDaily. Follow up with outpatient HD on 1/12. Discharge home        Since discharge, has been feeling well. Receiving Vanc with HD, labs drawn at HD, sees Nephrology weekly at HD. Swelling at baseline. No issues with suprapubic catheter. No fever, chills. No pain or skin changes at [facility].     Saw Vascular Surgery in clinic 1/28: had only been using perma cath up until then. LUE AVF Fistula patent, vascular steal improved, okay'd cannulation starting next day.    Has been using AVF since then. At both HD sessions this week, patient experienced significant swelling near AVF and L hand numbness. She awoke from sleep last night with severe L hand pain. No ulcerations noted. Has vascular f/u later this month.     Adherent with medications, taking lasix, adequate UOP.       Objective:      Vitals:    02/06/25 1317   BP: 130/72   Pulse: 78   Resp: 16   Temp: 98.8 °F (37.1 °C)       Physical Exam  Constitutional:       Appearance: Normal appearance. She is not ill-appearing.   HENT:      Head: Normocephalic and atraumatic.      Right Ear: External ear normal.      Left Ear: External ear normal.      Nose: Nose normal.   Eyes:      General: No scleral icterus.     Extraocular Movements: Extraocular movements intact.   Cardiovascular:      Rate and Rhythm: Normal rate.   Pulmonary:      Effort: Pulmonary effort is normal. No respiratory distress.   Abdominal:      General: There is no distension.   Musculoskeletal:      Cervical back: Normal range of motion.      Right lower leg: Edema present.      Left lower leg: Edema present.   Skin:     General: Skin is warm.   Neurological:      Mental Status: She is alert.         Current Outpatient Medications on File Prior to Visit   Medication Sig Dispense Refill     "0.9% NaCl PgBk 100 mL with vancomycin 500 mg SolR Vancomycin random levels before HD  Vancomycin 500 mg IV x1 for after HD on monday, wednesday and friday  Re-dose when the random level is less than 20 mcg/mL      amLODIPine (NORVASC) 10 MG tablet TAKE 1 TABLET BY MOUTH EVERY DAY 90 tablet 3    anastrozole (ARIMIDEX) 1 mg Tab TAKE 1 TABLET BY MOUTH EVERY DAY 90 tablet 2    BD INSULIN SYRINGE ULTRA-FINE 0.5 mL 31 gauge x 5/16" Syrg USE WITH INSULIN 4 TIMES A  each 12    calcium acetate,phosphat bind, (PHOSLO) 667 mg capsule Take 667 mg by mouth 3 (three) times daily with meals.      carvediloL (COREG) 12.5 MG tablet Take 1 tablet (12.5 mg total) by mouth 2 (two) times daily. 180 tablet 3    diclofenac sodium (VOLTAREN) 1 % Gel Apply 2 g topically as needed.      DULoxetine (CYMBALTA) 20 MG capsule Take 2 capsules (40 mg total) by mouth once daily. 180 capsule 1    ELIQUIS 5 mg Tab TAKE 1 TABLET BY MOUTH TWICE A DAY (Patient taking differently: Take 5 mg by mouth Daily. Pt takes once a day) 180 tablet 3    erenumab-aooe (AIMOVIG AUTOINJECTOR) 140 mg/mL autoinjector 140 mg MONTHLY (route: subcutaneous) 1 each 11    ergocalciferol (ERGOCALCIFEROL) 50,000 unit Cap Take 1 capsule (50,000 Units total) by mouth every 7 days. 12 capsule 3    furosemide (LASIX) 80 MG tablet Take 1 tablet (80 mg total) by mouth once daily. 30 tablet 11    HYDROcodone-acetaminophen (NORCO)  mg per tablet Take 1 tablet by mouth every 6 (six) hours as needed for Pain. 120 tablet 0    ipratropium (ATROVENT) 21 mcg (0.03 %) nasal spray 2 sprays by Each Nostril route 2 (two) times daily. (Patient taking differently: 2 sprays by Each Nostril route 2 (two) times daily as needed for Rhinitis.) 30 mL 0    lactulose (CHRONULAC) 20 gram/30 mL Soln Take 30 mLs (20 g total) by mouth every 12 (twelve) hours as needed (for constipation). 900 mL 0    LANTUS SOLOSTAR U-100 INSULIN 100 unit/mL (3 mL) InPn pen INJECT 12-15 UNITS UNDER THE SKIN at " "night 15 mL 3    losartan (COZAAR) 100 MG tablet Take 1 tablet (100 mg total) by mouth once daily. 90 tablet 3    methocarbamoL (ROBAXIN) 750 MG Tab TAKE 1 TO 2 TABLETS(750 TO 1500 MG) BY MOUTH THREE TIMES DAILY AS NEEDED FOR MUSCLE SPASMS 180 tablet 1    oxybutynin (DITROPAN-XL) 10 MG 24 hr tablet TAKE 1 TABLET BY MOUTH EVERY DAY 90 tablet 4    pen needle, diabetic (BD ULTRA-FINE SHORT PEN NEEDLE) 31 gauge x 5/16" Ndle USE WITH LANTUS DAILY, e 11.65 100 each 3    sodium bicarbonate 650 MG tablet Take 1 tablet (650 mg total) by mouth once daily. 30 tablet 11    sumatriptan (IMITREX) 100 MG tablet Take 1 tablet (100 mg total) by mouth every 2 (two) hours as needed for Migraine (Limit 2 in 14 hours and 9 in one month). 30 tablet 1    SYNTHROID 50 mcg tablet TAKE 1 TABLET BY MOUTH BEFORE BREAKFAST. ADMINISTER ON AN EMPTY STOMACH AT LEAST 30 MINUTES BEFORE FOOD. IF RECEIVING TUBE FEEDS, HOLD TUBE FEEDS FOR 1 HOUR BEFORE AND AFTER LEVOTHYROXINE ADMINISTRATION. 90 tablet 2    traZODone (DESYREL) 100 MG tablet TAKE 1 TABLET BY MOUTH NIGHTLY AS NEEDED FOR INSOMNIA. 90 tablet 2     Current Facility-Administered Medications on File Prior to Visit   Medication Dose Route Frequency Provider Last Rate Last Admin    epoetin chloe injection 2,000 Units  2,000 Units Intravenous 1 time in Clinic/HOD Emmanuel Hare Jr., MD        heparin (porcine) injection 2,000 Units  2,000 Units Intravenous Once Emmanuel Hare Jr., MD        heparin (porcine) injection 2,000 Units  2,000 Units Intravenous Once Emmanuel Hare Jr., MD        heparin (porcine) injection 4,000 Units  4,000 Units Intra-Catheter 1 time in Clinic/HOD Emmanuel Hare Jr., MD        iron sucrose injection 100 mg  100 mg Intravenous 1 time in Clinic/HOD Emmanuel Hare Jr., MD        [COMPLETED] vancomycin injection 1,000 mg  1,000 mg Intravenous Once in dialysis Lavinia Nieto NP   1,000 mg at 02/05/25 1425    [DISCONTINUED] ondansetron " injection 4 mg  4 mg Intravenous Q15 Min PRN Emmanuel Hare Jr., MD   4 mg at 02/05/25 1430         Assessment and Plan:       Hospital discharge follow-up for ESRD (end stage renal disease) and AVF (arteriovenous fistula)  Recurrence of swelling and hand pain with AVF use. Not tolerating; encouraged patient to use permacath as symptoms are not improving after sessions. Has scheduled Vascular Surgery appt. Low index of suspicion for compartment syndrome or other vascular emergency. Discussed precautions with patient.   -     magnesium oxide (MAG-OX) 400 mg (241.3 mg magnesium) tablet; Take 1 tablet (400 mg total) by mouth once daily.  Dispense: 30 tablet; Refill: 0  Medications reconciled      Follow-up w PCP 2 months, Vascular f/u 2/25/25    Case discussed with Dr. Cain    Time spent today: > 30 minutes on H&P, MRR, and MDM        Junior Fraga MD  Medical Resident  [facility]

## 2025-02-06 NOTE — TELEPHONE ENCOUNTER
----- Message from Thu sent at 2/6/2025  2:46 PM CST -----  Regarding: DANYELLE FARRIS [[id number]]  Good afternoon,  Dr. Fraga is requesting for patient: DANYELLE FARRIS [[id number]] to be seen by Dr. Cancino in 2 months. Please call pt to schedule appt. Thank you in advance.

## 2025-02-07 ENCOUNTER — TELEPHONE (OUTPATIENT)
Dept: PHYSICAL MEDICINE AND REHAB | Facility: CLINIC | Age: 75
End: 2025-02-07
Payer: MEDICARE

## 2025-02-07 DIAGNOSIS — M79.7 FIBROMYALGIA: ICD-10-CM

## 2025-02-07 DIAGNOSIS — M54.50 CHRONIC MIDLINE LOW BACK PAIN WITHOUT SCIATICA: Primary | ICD-10-CM

## 2025-02-07 DIAGNOSIS — G89.29 CHRONIC MIDLINE LOW BACK PAIN WITHOUT SCIATICA: Primary | ICD-10-CM

## 2025-02-07 DIAGNOSIS — M54.2 CHRONIC NECK PAIN: ICD-10-CM

## 2025-02-07 DIAGNOSIS — G89.29 CHRONIC NECK PAIN: ICD-10-CM

## 2025-02-07 RX ORDER — OXYCODONE HYDROCHLORIDE 5 MG/1
5 TABLET ORAL DAILY PRN
Qty: 30 TABLET | Refills: 0 | Status: SHIPPED | OUTPATIENT
Start: 2025-02-07 | End: 2025-02-26 | Stop reason: SDUPTHER

## 2025-02-07 NOTE — TELEPHONE ENCOUNTER
----- Message from Michelle sent at 2/6/2025  2:51 PM CST -----  Regarding: Rx Refill  Contact: [phone]  DANYELLE FARRIS calling regarding Refills  (message) for #  oxyCODONE (ROXICODONE) 5 MG immediate release tablet      [id number] - [place] - [address] [facility]  [address]  [facility] [address]  Phone: [phone] Fax: [phone]

## 2025-02-10 ENCOUNTER — EXTERNAL HOME HEALTH (OUTPATIENT)
Dept: HOME HEALTH SERVICES | Facility: HOSPITAL | Age: 75
End: 2025-02-10
Payer: MEDICARE

## 2025-02-13 RX ORDER — DULOXETIN HYDROCHLORIDE 20 MG/1
CAPSULE, DELAYED RELEASE ORAL
Qty: 180 CAPSULE | Refills: 1 | Status: SHIPPED | OUTPATIENT
Start: 2025-02-13

## 2025-02-17 RX ORDER — IPRATROPIUM BROMIDE 21 UG/1
2 SPRAY, METERED NASAL 2 TIMES DAILY
Qty: 30 ML | Refills: 0 | Status: SHIPPED | OUTPATIENT
Start: 2025-02-17

## 2025-02-17 NOTE — TELEPHONE ENCOUNTER
Refill Routing Note   Medication(s) are not appropriate for processing by [facility] for the following reason(s):        Non-participating provider    [facility] action(s):  Route               Appointments  past 12m or future 3m with PCP    Date Provider   Last Visit   12/5/2024 Rylee Martínez FNP   Next Visit   Visit date not found Rylee Martínez FNP   ED visits in past 90 days: 0        Note composed:1:37 PM 02/17/2025

## 2025-02-24 ENCOUNTER — TELEPHONE (OUTPATIENT)
Dept: VASCULAR SURGERY | Facility: CLINIC | Age: 75
End: 2025-02-24
Payer: MEDICARE

## 2025-02-24 NOTE — TELEPHONE ENCOUNTER
Contacted in reference to appt tomorrow with Dr. Friedman for possible permcath removal. Pt states she has been able to dialyze exclusively via LUE AVF for the past two weeks and that last dose of Eliquis was taken Saturday 02/22/25 in preparation for appt tomorrow. Confirmed appt details with patient.

## 2025-02-25 ENCOUNTER — PATIENT MESSAGE (OUTPATIENT)
Dept: PHYSICAL MEDICINE AND REHAB | Facility: CLINIC | Age: 75
End: 2025-02-25
Payer: MEDICARE

## 2025-02-25 ENCOUNTER — OFFICE VISIT (OUTPATIENT)
Dept: VASCULAR SURGERY | Facility: CLINIC | Age: 75
End: 2025-02-25
Payer: MEDICARE

## 2025-02-25 VITALS — DIASTOLIC BLOOD PRESSURE: 82 MMHG | HEART RATE: 101 BPM | SYSTOLIC BLOOD PRESSURE: 160 MMHG | TEMPERATURE: 99 F

## 2025-02-25 DIAGNOSIS — M54.50 CHRONIC MIDLINE LOW BACK PAIN WITHOUT SCIATICA: ICD-10-CM

## 2025-02-25 DIAGNOSIS — M79.7 FIBROMYALGIA: ICD-10-CM

## 2025-02-25 DIAGNOSIS — N18.6 ESRD (END STAGE RENAL DISEASE) ON DIALYSIS: Primary | ICD-10-CM

## 2025-02-25 DIAGNOSIS — M54.2 CHRONIC NECK PAIN: ICD-10-CM

## 2025-02-25 DIAGNOSIS — G89.29 CHRONIC MIDLINE LOW BACK PAIN WITHOUT SCIATICA: ICD-10-CM

## 2025-02-25 DIAGNOSIS — G89.29 CHRONIC NECK PAIN: ICD-10-CM

## 2025-02-25 DIAGNOSIS — Z99.2 ESRD (END STAGE RENAL DISEASE) ON DIALYSIS: Primary | ICD-10-CM

## 2025-02-25 PROCEDURE — 1101F PT FALLS ASSESS-DOCD LE1/YR: CPT | Mod: HCNC,CPTII,S$GLB, | Performed by: SURGERY

## 2025-02-25 PROCEDURE — 1160F RVW MEDS BY RX/DR IN RCRD: CPT | Mod: HCNC,CPTII,S$GLB, | Performed by: SURGERY

## 2025-02-25 PROCEDURE — 1125F AMNT PAIN NOTED PAIN PRSNT: CPT | Mod: HCNC,CPTII,S$GLB, | Performed by: SURGERY

## 2025-02-25 PROCEDURE — 36589 REMOVAL TUNNELED CV CATH: CPT | Mod: HCNC,S$GLB,, | Performed by: SURGERY

## 2025-02-25 PROCEDURE — 3044F HG A1C LEVEL LT 7.0%: CPT | Mod: HCNC,CPTII,S$GLB, | Performed by: SURGERY

## 2025-02-25 PROCEDURE — 3066F NEPHROPATHY DOC TX: CPT | Mod: HCNC,CPTII,S$GLB, | Performed by: SURGERY

## 2025-02-25 PROCEDURE — 99999 PR PBB SHADOW E&M-EST. PATIENT-LVL IV: CPT | Mod: PBBFAC,HCNC,, | Performed by: SURGERY

## 2025-02-25 PROCEDURE — 3072F LOW RISK FOR RETINOPATHY: CPT | Mod: HCNC,CPTII,S$GLB, | Performed by: SURGERY

## 2025-02-25 PROCEDURE — 3288F FALL RISK ASSESSMENT DOCD: CPT | Mod: HCNC,CPTII,S$GLB, | Performed by: SURGERY

## 2025-02-25 PROCEDURE — 3079F DIAST BP 80-89 MM HG: CPT | Mod: HCNC,CPTII,S$GLB, | Performed by: SURGERY

## 2025-02-25 PROCEDURE — 3077F SYST BP >= 140 MM HG: CPT | Mod: HCNC,CPTII,S$GLB, | Performed by: SURGERY

## 2025-02-25 PROCEDURE — 99213 OFFICE O/P EST LOW 20 MIN: CPT | Mod: 25,HCNC,S$GLB, | Performed by: SURGERY

## 2025-02-25 PROCEDURE — 1159F MED LIST DOCD IN RCRD: CPT | Mod: HCNC,CPTII,S$GLB, | Performed by: SURGERY

## 2025-02-25 NOTE — PROGRESS NOTES
VASCULAR SURGERY SERVICE    REFERRING DOCTOR: Lavinia Nieto NP     CHIEF COMPLAINT:  End-stage renal disease    HISTORY OF PRESENT ILLNESS: Cecile Bowen is a 74 y.o. female with prior simple mastectomy on the left, sentinel node biopsy, no axillary node dissection, with end-stage renal disease times 3 months via right IJ PermCath who is sent for permanent dialysis access.  She is currently dialyzing only Mondays and Fridays.    She is right-handed.  She has no history of AICD or pacemaker placement.  She does have known bilateral carpal tunnel syndrome with the occasional numbness in her hands but has not had a release.    She takes Eliquis 5 mg but only once a day because of easy bruising.  She is on clear why she is on Eliquis    Finally she takes chronic narcotics for back pain    S/p    1a.  Diagnostic left arm arteriogram 09/19/2024  1/ trans radial PTA, left AV fistula 04/29/2024  2. left brachiocephalic AV fistula creation 02/14/2024      10/15/2024:   She now returns after diagnostic arteriogram of the left arm to evaluate for steal.  In the interim she has also started to cannulate her AV fistula has used exclusively for 2 weeks.  She is now complaining of left hand pain at night over the last 7-10 days which was not prior before.  Notably the small ulceration she had had a nailbed have healed however.  She does have prior carpal tunnel on this side and wears a chronic brace    12/03/2024:  A banding procedure but had to cancel because of transportation issues.  Notably however her intermittent hand pain has resolved and she now only having fingertip numbness.  All ulceration fingers also healed.  She was using the fistula essentially exclusively but 2-3 days ago had a episode of extravasation    12/31/2024:  She returns to potentially to remove her PermCath.  , she reports using the PermCath only yesterday.  On presentation here she is noted to be tachycardic with a pulse of 104 with a  temperature of 100.9°, the patient is quite somnolent although can answer questions appropriately then nods off sometimes during mid sentence.  This is not her baseline    01/28/2025:  As per my last note when I saw her a month ago she was septic and sent to the ED and subsequently admitted with urosepsis.  During that hospitalization they change her PermCath.  She says he has been using her PermCath only for the last 3-4 weeks    02/25/2025:  She now returns, using her fistula exclusively    Past Medical History:   Diagnosis Date    Cervicogenic migraine     Chronic pain     CKD (chronic kidney disease) stage 4, GFR 15-29 ml/min     [facility]    CKD (chronic kidney disease) stage 4, GFR 15-29 ml/min     Diabetes mellitus     Long term use of Insulin, Diabetic Neuropathy    Dialysis patient 1/21/2022    Fibromyalgia     Hydronephrosis     Hyperlipidemia     Hypertension 12/12/2012    Hypothyroidism 12/12/2012    ARON (iron deficiency anemia)     Insomnia     Levoscoliosis     Malignant neoplasm of upper-outer quadrant of left breast in female, estrogen receptor positive     Metabolic acidosis     Mobility impaired     Nuclear sclerosis - Both Eyes 3/24/2014    VIJAYA (obstructive sleep apnea)     Osteopenia     Pulmonary nodule     Recurrent UTI     Renal manifestation of secondary diabetes mellitus     Secondary hyperparathyroidism, renal     Urinary retention        Past Surgical History:   Procedure Laterality Date    ANGIOGRAPHY OF UPPER EXTREMITY N/A 9/19/2024    Procedure: Angiogram Extremity Bilateral;  Surgeon: RODRIGO Friedman III, MD;  Location: [facility] OR [address] [place] [address];  Service: Vascular;  Laterality: N/A;  R femoral approach, arch & arm angiogram  168.76mgy  33.1145 gycm2  8.9min    AV FISTULA PLACEMENT Left 2/14/2024    Procedure: CREATION, AV FISTULA;  Surgeon: RODRIGO rFiedman III, MD;  Location: [facility] OR [address] [place] [address];  Service: Vascular;  Laterality: Left;  LUE AVF creation vs AVG insertion    BIOPSY OF URETER Left  2/18/2022    Procedure: BIOPSY, URETER;  Surgeon: Ronel Whittington MD;  Location: [facility] OR 1ST FLR;  Service: Urology;  Laterality: Left;    BREAST BIOPSY Right     benign    CHOLECYSTECTOMY      COLONOSCOPY N/A 1/13/2017    Procedure: COLONOSCOPY;  Surgeon: Morirs Wiseman MD;  Location: [facility] ENDO (4TH FLR);  Service: Endoscopy;  Laterality: N/A;  Renal pt Nephrology advised to avoid phosphate preps    COLONOSCOPY N/A 12/7/2023    Procedure: COLONOSCOPY;  Surgeon: Herve Allen MD;  Location: [facility] ENDO (2ND FLR);  Service: Endoscopy;  Laterality: N/A;  HD MWF; labs prior  suprapubic catheter  pt does not ambulate-uses christina lift- will have sling with her  9/7 ref. by Anastasiia Campbell DO, PEG, instr. mailed, Eliquis hold approval pending-st  ok to hold Eliquis 2 days per Dr Navarro-GT  1/30-precall complete-MS    CYSTOSCOPY N/A 10/8/2018    Procedure: CYSTOSCOPY;  Surgeon: Ronel Whittington MD;  [facility];  Service: Urology;  Laterality: N/A;  45 min    CYSTOSCOPY N/A 3/25/2019    Procedure: CYSTOSCOPY;  Surgeon: Ronel Whittington MD;  [facility];  Service: Urology;  Laterality: N/A;  45 min    CYSTOSCOPY N/A 8/26/2019    Procedure: CYSTOSCOPY;  Surgeon: Ronel Whittington MD;  [facility];  Service: Urology;  Laterality: N/A;  45 min    CYSTOSCOPY N/A 7/2/2021    Procedure: CYSTOSCOPY;  Surgeon: Ronel Whittington MD;  [facility];  Service: Urology;  Laterality: N/A;    CYSTOSCOPY  12/23/2021    Procedure: CYSTOSCOPY;  Surgeon: Ronel Whittington MD;  [facility];  Service: Urology;;    CYSTOSCOPY  2/18/2022    Procedure: CYSTOSCOPY;  Surgeon: Ronel Whittington MD;  Location: [facility] OR 1ST FLR;  Service: Urology;;    CYSTOSCOPY W/ URETERAL STENT PLACEMENT Left 5/15/2021    Procedure: CYSTOSCOPY, WITH URETERAL STENT INSERTION;  Surgeon: Levy Sánchez Jr., MD;  Location: [facility] OR [address] [place] [address];  Service:  Urology;  Laterality: Left;    CYSTOSCOPY WITH BIOPSY OF BLADDER N/A 1/27/2020    Procedure: CYSTOSCOPY, WITH BLADDER BIOPSY, WITH FULGURATION IF INDICATED;  Surgeon: Ronel Whittington MD;  Location: [facility] OR [address] [place] [address];  Service: Urology;  Laterality: N/A;    DILATION AND CURETTAGE OF UTERUS      FISTULOGRAM N/A 4/29/2024    Procedure: Fistulogram;  Surgeon: RODRIGO Friedman III, MD;  Location: [facility] CATH LAB;  Service: Peripheral Vascular;  Laterality: N/A;    FISTULOGRAM Left 1/6/2025    Procedure: Fistulogram;  Surgeon: RODRIGO Friedman III, MD;  Location: [facility] CATH LAB;  Service: Peripheral Vascular;  Laterality: Left;    GALLBLADDER SURGERY  2006    HYSTERECTOMY      INJECTION FOR SENTINEL NODE IDENTIFICATION Left 8/1/2019    Procedure: INJECTION, FOR SENTINEL NODE IDENTIFICATION;  Surgeon: Samia Fulton MD;  Location: [address];  Service: General;  Laterality: Left;    INJECTION OF BOTULINUM TOXIN TYPE A N/A 10/8/2018    Procedure: INJECTION, BOTULINUM TOXIN, TYPE A 300 UNITS;  Surgeon: Ronel Whittington MD;  Location: [address];  Service: Urology;  Laterality: N/A;    INJECTION OF BOTULINUM TOXIN TYPE A N/A 3/25/2019    Procedure: INJECTION, BOTULINUM TOXIN, TYPE A 300 UNITS;  Surgeon: Ronel Whittington MD;  Location: [address];  Service: Urology;  Laterality: N/A;    INJECTION OF BOTULINUM TOXIN TYPE A N/A 8/26/2019    Procedure: INJECTION, BOTULINUM TOXIN, TYPE A 300 UNITS;  Surgeon: Ronel Whittington MD;  Location: [facility] OR [address] [place] [address];  Service: Urology;  Laterality: N/A;    MASTECTOMY Left 8/1/2019    Procedure: MASTECTOMY 23 hour stay;  Surgeon: Samia Fulton MD;  Location: [facility] OR [address] [place] [address];  Service: General;  Laterality: Left;    MEDIPORT REMOVAL Right 6/24/2022    Procedure: REMOVAL, CATHETER, CENTRAL VENOUS, TUNNELED, WITH PORT;  Surgeon: Isauro Anderson MD;  Location: [facility] CATH LAB;  Service: Radiology;  Laterality: Right;    OVARIAN CYST SURGERY  1985     "PERCUTANEOUS TRANSLUMINAL ANGIOPLASTY OF ARTERIOVENOUS FISTULA Left 4/29/2024    Procedure: PTA, AV FISTULA;  Surgeon: RODRIGO Friedman III, MD;  Location: [facility] CATH LAB;  Service: Peripheral Vascular;  Laterality: Left;    REPLACEMENT OF STENT Left 12/23/2021    Procedure: REPLACEMENT, STENT;  Surgeon: Ronel Whittington MD;  Location: [facility] OR [facility] FLR;  Service: Urology;  Laterality: Left;    REPLACEMENT OF STENT Left 2/18/2022    Procedure: REPLACEMENT, STENT;  Surgeon: Ronel Whittington MD;  Location: [facility] OR 1ST FLR;  Service: Urology;  Laterality: Left;    RETROGRADE PYELOGRAPHY Left 2/18/2022    Procedure: PYELOGRAM, RETROGRADE;  Surgeon: Ronel Whittington MD;  Location: [facility] OR [facility] FLR;  Service: Urology;  Laterality: Left;    SENTINEL LYMPH NODE BIOPSY Left 8/1/2019    Procedure: BIOPSY, LYMPH NODE, SENTINEL;  Surgeon: Samia Fulton MD;  Location: [facility] OR 2ND FLR;  Service: General;  Laterality: Left;    spt placement      TONSILLECTOMY, ADENOIDECTOMY      TOTAL ABDOMINAL HYSTERECTOMY W/ BILATERAL SALPINGOOPHORECTOMY  1985    URETEROSCOPY Left 2/18/2022    Procedure: URETEROSCOPY;  Surgeon: Ronel Whittington MD;  [facility];  Service: Urology;  Laterality: Left;         Current Outpatient Medications:     0.9% NaCl PgBk 100 mL with vancomycin 500 mg SolR, Vancomycin random levels before HD Vancomycin 500 mg IV x1 for after HD on monday, wednesday and friday Re-dose when the random level is less than 20 mcg/mL, Disp: , Rfl:     amLODIPine (NORVASC) 10 MG tablet, TAKE 1 TABLET BY MOUTH EVERY DAY, Disp: 90 tablet, Rfl: 3    anastrozole (ARIMIDEX) 1 mg Tab, TAKE 1 TABLET BY MOUTH EVERY DAY, Disp: 90 tablet, Rfl: 2    BD INSULIN SYRINGE ULTRA-FINE 0.5 mL 31 gauge x 5/16" Syrg, USE WITH INSULIN 4 TIMES A DAY, Disp: 100 each, Rfl: 12    calcium acetate,phosphat bind, (PHOSLO) 667 mg capsule, Take 667 mg by mouth 3 (three) times daily with meals., Disp: , Rfl:     carvediloL " (COREG) 12.5 MG tablet, Take 1 tablet (12.5 mg total) by mouth 2 (two) times daily., Disp: 180 tablet, Rfl: 3    diclofenac sodium (VOLTAREN) 1 % Gel, Apply 2 g topically as needed., Disp: , Rfl:     DULoxetine (CYMBALTA) 20 MG capsule, TAKE 2 CAPSULES(40 MG) BY MOUTH DAILY, Disp: 180 capsule, Rfl: 1    ELIQUIS 5 mg Tab, TAKE 1 TABLET BY MOUTH TWICE A DAY (Patient taking differently: Take 5 mg by mouth Daily. Pt takes once a day), Disp: 180 tablet, Rfl: 3    erenumab-aooe (AIMOVIG AUTOINJECTOR) 140 mg/mL autoinjector, 140 mg MONTHLY (route: subcutaneous), Disp: 1 each, Rfl: 11    ergocalciferol (ERGOCALCIFEROL) 50,000 unit Cap, Take 1 capsule (50,000 Units total) by mouth every 7 days., Disp: 12 capsule, Rfl: 3    furosemide (LASIX) 80 MG tablet, Take 1 tablet (80 mg total) by mouth once daily., Disp: 30 tablet, Rfl: 11    ipratropium (ATROVENT) 21 mcg (0.03 %) nasal spray, USE 2 SPRAYS IN EACH NOSTRIL TWICE DAILY, Disp: 30 mL, Rfl: 0    lactulose (CHRONULAC) 20 gram/30 mL Soln, Take 30 mLs (20 g total) by mouth every 12 (twelve) hours as needed (for constipation)., Disp: 900 mL, Rfl: 0    LANTUS SOLOSTAR U-100 INSULIN 100 unit/mL (3 mL) InPn pen, INJECT 12-15 UNITS UNDER THE SKIN at night, Disp: 15 mL, Rfl: 3    losartan (COZAAR) 100 MG tablet, Take 1 tablet (100 mg total) by mouth once daily., Disp: 90 tablet, Rfl: 3    magnesium oxide (MAG-OX) 400 mg (241.3 mg magnesium) tablet, Take 1 tablet (400 mg total) by mouth once daily., Disp: 30 tablet, Rfl: 0    methocarbamoL (ROBAXIN) 750 MG Tab, TAKE 1 TO 2 TABLETS(750 TO 1500 MG) BY MOUTH THREE TIMES DAILY AS NEEDED FOR MUSCLE SPASMS, Disp: 180 tablet, Rfl: 1    oxybutynin (DITROPAN-XL) 10 MG 24 hr tablet, TAKE 1 TABLET BY MOUTH EVERY DAY, Disp: 90 tablet, Rfl: 4    oxyCODONE (ROXICODONE) 5 MG immediate release tablet, Take 1 tablet (5 mg total) by mouth daily as needed (severe pain)., Disp: 30 tablet, Rfl: 0    pen needle, diabetic (BD ULTRA-FINE SHORT PEN NEEDLE)  "31 gauge x 5/16" Ndle, USE WITH LANTUS DAILY, e 11.65, Disp: 100 each, Rfl: 3    sodium bicarbonate 650 MG tablet, Take 1 tablet (650 mg total) by mouth once daily., Disp: 30 tablet, Rfl: 11    sumatriptan (IMITREX) 100 MG tablet, Take 1 tablet (100 mg total) by mouth every 2 (two) hours as needed for Migraine (Limit 2 in 14 hours and 9 in one month)., Disp: 30 tablet, Rfl: 1    SYNTHROID 50 mcg tablet, TAKE 1 TABLET BY MOUTH BEFORE BREAKFAST. ADMINISTER ON AN EMPTY STOMACH AT LEAST 30 MINUTES BEFORE FOOD. IF RECEIVING TUBE FEEDS, HOLD TUBE FEEDS FOR 1 HOUR BEFORE AND AFTER LEVOTHYROXINE ADMINISTRATION., Disp: 90 tablet, Rfl: 2    traZODone (DESYREL) 100 MG tablet, TAKE 1 TABLET BY MOUTH NIGHTLY AS NEEDED FOR INSOMNIA., Disp: 90 tablet, Rfl: 2    HYDROcodone-acetaminophen (NORCO)  mg per tablet, Take 1 tablet by mouth every 6 (six) hours as needed for Pain., Disp: 120 tablet, Rfl: 0  No current facility-administered medications for this visit.    Facility-Administered Medications Ordered in Other Visits:     epoetin chloe injection 2,000 Units, 2,000 Units, Intravenous, 1 time in Clinic/HOD, Emmanuel Hare Jr., MD    heparin (porcine) injection 2,000 Units, 2,000 Units, Intravenous, Once, Emmanuel Hare Jr., MD    heparin (porcine) injection 2,000 Units, 2,000 Units, Intravenous, Once, Emmanuel Hare Jr., MD    heparin (porcine) injection 4,000 Units, 4,000 Units, Intra-Catheter, 1 time in Clinic/HOD, Emmanuel Hare Jr., MD    iron sucrose injection 100 mg, 100 mg, Intravenous, 1 time in Clinic/HOD, Emmanuel Hare Jr., MD    Review of patient's allergies indicates:  No Known Allergies    Family History   Problem Relation Name Age of Onset    Diabetes Sister Kat     Kidney disease Sister Kat         CKD III    ALS Mother          d.    Cancer Maternal Grandmother          d. colon    Cancer Paternal Grandfather          d. lung    Scoliosis Brother [place]         increased pain    " Prostate cancer Brother [place]         cured s/p surgery    Cancer Brother [place]         rare cancer that got into the bones    Diabetes Maternal Aunt      Kidney disease Maternal Aunt      Diabetes Maternal Uncle      Amblyopia Neg Hx      Blindness Neg Hx      Cataracts Neg Hx      Glaucoma Neg Hx      Macular degeneration Neg Hx      Retinal detachment Neg Hx      Strabismus Neg Hx         Social History     Tobacco Use    Smoking status: Never    Smokeless tobacco: Never   Substance Use Topics    Alcohol use: No     Alcohol/week: 0.0 standard drinks of alcohol    Drug use: No     PHYSICAL EXAM:   BP (!) 160/82 (BP Location: Right arm, Patient Position: Sitting)   Pulse 101   Temp 98.5 °F (36.9 °C) (Oral)   Constitutional:  Patient is quite alert today  In no distress.  Traveling by wheelchair   Neurological: Normal speech  no focal findings  CN II - XII grossly intact.    Psychiatric: Mood and affect appropriate and symmetric.   HEENT: Normocephalic / atraumatic  PERRLA  Midline trachea  No scars across the neck   Cardiac: Regular rate and rhythm.   Pulmonary: Normal pulmonary effort.   Abdomen: Soft, not distended.     Skin: Warm and well perfused.    Vascular:  Radial pulse on the left today is trace 1+ palpable, although it clearly stronger with fistula compression   Extremities/  Musculoskeletal: Left arm:  Left brachiocephalic AV fistula has a soft thrill absolutely no pulsatility    Left hand 5/5  strength.     2024      IMAGIN/3/24 Duplex of the fistula shows a velocity shift 506 para anastomotic region but no B-mode stenosis.  Flow volume is 1.46 L (prior 1.9 L flow volume of 1.9 L.    Prior Left finger PPG is are completely flat in all digits.  This does improve somewhat with fistula compression  Duplex of the fistula demonstrates no stenosis, flow volume of 1.9 L  Diameter 8.9 proximal, 8.3 mid, 7.6 distal  Depth 4.1 proximal, 5.9 minute, 6.5 distal      IMPRESSION:   Patent left AV  fistula, now being used exclusively  Resolved vascular steal.  Patient was not having any hand pain now.  We will not reschedule the banding procedure.    Narcotic dependence for chronic back pain.  4.  Nonambulatory status x1 year  5.  On chronic Eliquis, evidently for atrial fibrillation diagnosed in 2021.  By clinical exam she does not feel that she is in AFib.  She was unaware of why she is on the Eliquis       PLAN:   Remove PermCath today  Six-month follow up with AV fistula duplex sooner if clinically indicated    PROCEDURE NOTE:   After sterile prep and drape and infiltration with lidocaine, the permacath cuff was freed up using blunt and sharp dissection, the permacath removed, and manual compression used for hemostasis.   No complications     WALE Friedman III, MD, FACS  Professor and Chief, Vascular and Endovascular Surgery

## 2025-02-26 ENCOUNTER — DOCUMENT SCAN (OUTPATIENT)
Dept: HOME HEALTH SERVICES | Facility: HOSPITAL | Age: 75
End: 2025-02-26
Payer: MEDICARE

## 2025-02-26 RX ORDER — HYDROCODONE BITARTRATE AND ACETAMINOPHEN 10; 325 MG/1; MG/1
1 TABLET ORAL EVERY 6 HOURS PRN
Qty: 120 TABLET | Refills: 0 | Status: SHIPPED | OUTPATIENT
Start: 2025-02-26 | End: 2025-03-28

## 2025-02-26 RX ORDER — METHOCARBAMOL 750 MG/1
TABLET, FILM COATED ORAL
Qty: 180 TABLET | Refills: 1 | Status: SHIPPED | OUTPATIENT
Start: 2025-02-26

## 2025-02-26 RX ORDER — OXYCODONE HYDROCHLORIDE 5 MG/1
5 TABLET ORAL DAILY PRN
Qty: 30 TABLET | Refills: 0 | Status: SHIPPED | OUTPATIENT
Start: 2025-02-26 | End: 2025-03-28

## 2025-02-26 NOTE — TELEPHONE ENCOUNTER
Last ordered:  Rindge & Robaxin 12/20/24  Roxicodone 02/07/25    Last seen:11/14/23  Upcoming appt:none

## 2025-02-27 ENCOUNTER — DOCUMENT SCAN (OUTPATIENT)
Dept: HOME HEALTH SERVICES | Facility: HOSPITAL | Age: 75
End: 2025-02-27
Payer: MEDICARE

## 2025-03-05 ENCOUNTER — DOCUMENT SCAN (OUTPATIENT)
Dept: HOME HEALTH SERVICES | Facility: HOSPITAL | Age: 75
End: 2025-03-05
Payer: MEDICARE

## 2025-03-05 ENCOUNTER — DOCUMENT SCAN (OUTPATIENT)
Dept: HOME HEALTH SERVICES | Facility: HOSPITAL | Age: 75
End: 2025-03-05

## 2025-03-16 ENCOUNTER — PATIENT MESSAGE (OUTPATIENT)
Dept: INTERNAL MEDICINE | Facility: CLINIC | Age: 75
End: 2025-03-16
Payer: MEDICARE

## 2025-03-16 DIAGNOSIS — G89.29 CHRONIC MIDLINE LOW BACK PAIN WITHOUT SCIATICA: ICD-10-CM

## 2025-03-16 DIAGNOSIS — M54.50 CHRONIC MIDLINE LOW BACK PAIN WITHOUT SCIATICA: ICD-10-CM

## 2025-03-16 DIAGNOSIS — G89.29 CHRONIC NECK PAIN: ICD-10-CM

## 2025-03-16 DIAGNOSIS — M54.2 CHRONIC NECK PAIN: ICD-10-CM

## 2025-03-16 DIAGNOSIS — M94.0 COSTOCHONDRITIS: ICD-10-CM

## 2025-03-17 RX ORDER — DICLOFENAC SODIUM 10 MG/G
2 GEL TOPICAL 2 TIMES DAILY PRN
Qty: 3 EACH | Refills: 2 | Status: SHIPPED | OUTPATIENT
Start: 2025-03-17 | End: 2025-04-16

## 2025-03-31 ENCOUNTER — PATIENT MESSAGE (OUTPATIENT)
Dept: PHYSICAL MEDICINE AND REHAB | Facility: CLINIC | Age: 75
End: 2025-03-31
Payer: MEDICARE

## 2025-04-01 DIAGNOSIS — M79.7 FIBROMYALGIA: ICD-10-CM

## 2025-04-01 DIAGNOSIS — G89.29 CHRONIC NECK PAIN: ICD-10-CM

## 2025-04-01 DIAGNOSIS — G89.29 CHRONIC MIDLINE LOW BACK PAIN WITHOUT SCIATICA: ICD-10-CM

## 2025-04-01 DIAGNOSIS — M54.50 CHRONIC MIDLINE LOW BACK PAIN WITHOUT SCIATICA: ICD-10-CM

## 2025-04-01 DIAGNOSIS — M54.2 CHRONIC NECK PAIN: ICD-10-CM

## 2025-04-01 RX ORDER — METHOCARBAMOL 750 MG/1
TABLET, FILM COATED ORAL
Qty: 180 TABLET | Refills: 1 | Status: SHIPPED | OUTPATIENT
Start: 2025-04-01

## 2025-04-01 RX ORDER — HYDROCODONE BITARTRATE AND ACETAMINOPHEN 10; 325 MG/1; MG/1
1 TABLET ORAL EVERY 6 HOURS PRN
Qty: 120 TABLET | Refills: 0 | Status: SHIPPED | OUTPATIENT
Start: 2025-04-01 | End: 2025-05-01

## 2025-04-01 NOTE — TELEPHONE ENCOUNTER
Last ordered:HYDROcodone-acetaminophen (NORCO)  mg per tablet 02/26/2025     methocarbamoL (ROBAXIN) 750 MG Tab 02/26/2025     Roxicodone 02/26/2025    Last seen:11/14/2023  Upcoming appt:

## 2025-04-07 NOTE — INTERVAL H&P NOTE
Orders:    Ambulatory Referral to Dermatology; Future     The patient has been examined and the H&P has been reviewed:    I concur with the findings and no changes have occurred since H&P was written.   Has been taking her antibiotics. No n/v/f/c.     Anesthesia/Surgery risks, benefits and alternative options discussed and understood by patient/family.          Active Hospital Problems    Diagnosis  POA    Neurogenic bladder [N31.9]  Yes      Resolved Hospital Problems   No resolved problems to display.

## 2025-04-09 ENCOUNTER — OFFICE VISIT (OUTPATIENT)
Dept: INTERNAL MEDICINE | Facility: CLINIC | Age: 75
End: 2025-04-09
Payer: MEDICARE

## 2025-04-09 DIAGNOSIS — E11.22 TYPE 2 DIABETES MELLITUS WITH CHRONIC KIDNEY DISEASE ON CHRONIC DIALYSIS, WITH LONG-TERM CURRENT USE OF INSULIN: Primary | ICD-10-CM

## 2025-04-09 DIAGNOSIS — N18.6 TYPE 2 DIABETES MELLITUS WITH CHRONIC KIDNEY DISEASE ON CHRONIC DIALYSIS, WITH LONG-TERM CURRENT USE OF INSULIN: Primary | ICD-10-CM

## 2025-04-09 DIAGNOSIS — Z99.2 TYPE 2 DIABETES MELLITUS WITH CHRONIC KIDNEY DISEASE ON CHRONIC DIALYSIS, WITH LONG-TERM CURRENT USE OF INSULIN: Primary | ICD-10-CM

## 2025-04-09 DIAGNOSIS — Z79.4 TYPE 2 DIABETES MELLITUS WITH CHRONIC KIDNEY DISEASE ON CHRONIC DIALYSIS, WITH LONG-TERM CURRENT USE OF INSULIN: Primary | ICD-10-CM

## 2025-04-10 NOTE — PROGRESS NOTES
Audio Only Telehealth Visit   Mail box full -called 3x   Using nadya in the past  Ckd 5/dialysis pt      The reason for the audio only service rather than synchronous audio and video virtual visit was related to technical difficulties or patient preference/necessity.       Each patient to whom I provide medical services by telemedicine is:  (1) informed of the relationship between the physician and patient and the respective role of any other health care provider with respect to management of the patient; and (2) notified that they may decline to receive medical services by telemedicine and may withdraw from such care at any time. Patient verbally consented to receive this service via voice-only telephone call.       HPI: Type 2 Diabetes     Assessment and plan:      1. Type 2 diabetes mellitus with chronic kidney disease on chronic dialysis, with long-term current use of insulin                                  This service was not originating from a related E/M service provided within the previous 7 days nor will  to an E/M service or procedure within the next 24 hours or my soonest available appointment.  Prevailing standard of care was able to be met in this audio-only visit.

## 2025-04-14 ENCOUNTER — EXTERNAL HOME HEALTH (OUTPATIENT)
Dept: HOME HEALTH SERVICES | Facility: HOSPITAL | Age: 75
End: 2025-04-14
Payer: MEDICARE

## 2025-04-15 DIAGNOSIS — M54.50 CHRONIC MIDLINE LOW BACK PAIN WITHOUT SCIATICA: ICD-10-CM

## 2025-04-15 DIAGNOSIS — G89.29 CHRONIC MIDLINE LOW BACK PAIN WITHOUT SCIATICA: ICD-10-CM

## 2025-04-15 DIAGNOSIS — M79.7 FIBROMYALGIA: ICD-10-CM

## 2025-04-15 DIAGNOSIS — M54.2 CHRONIC NECK PAIN: ICD-10-CM

## 2025-04-15 DIAGNOSIS — G89.29 CHRONIC NECK PAIN: ICD-10-CM

## 2025-04-15 RX ORDER — OXYCODONE HYDROCHLORIDE 5 MG/1
5 TABLET ORAL DAILY PRN
Qty: 30 TABLET | Refills: 0 | Status: SHIPPED | OUTPATIENT
Start: 2025-04-15 | End: 2025-05-15

## 2025-04-15 NOTE — TELEPHONE ENCOUNTER
Last ordered:02/26/2025     Last seen:11/14/2023  Upcoming appt:    ----- Message from Dominic sent at 4/15/2025  4:28 PM CDT -----  Regarding: Rx Refill  Contact: [phone]  RX Name:oxyCODONE (ROXICODONE) 5 MG immediate release tablet  How is it taken:  Take 1 tablet (5 mg total) by mouth daily as needed (severe pain). - Oral Quantity: 30 tablet Preferred Pharmacy with phone number: [id number]  [facility] [facility] [address] [facility] Phone: [phone]: [phone]  Ordering Provider: Dr. Stafford Contact Preference:  [phone] Addl info: Trying to get script today

## 2025-04-16 ENCOUNTER — HOSPITAL ENCOUNTER (INPATIENT)
Facility: HOSPITAL | Age: 75
LOS: 1 days | Discharge: HOME | DRG: 698 | End: 2025-04-17
Attending: STUDENT IN AN ORGANIZED HEALTH CARE EDUCATION/TRAINING PROGRAM | Admitting: STUDENT IN AN ORGANIZED HEALTH CARE EDUCATION/TRAINING PROGRAM
Payer: MEDICARE

## 2025-04-16 DIAGNOSIS — R07.9 CHEST PAIN: ICD-10-CM

## 2025-04-16 DIAGNOSIS — N39.0 URINARY TRACT INFECTION ASSOCIATED WITH INDWELLING URETHRAL CATHETER, INITIAL ENCOUNTER: Primary | ICD-10-CM

## 2025-04-16 DIAGNOSIS — T83.511A URINARY TRACT INFECTION ASSOCIATED WITH INDWELLING URETHRAL CATHETER, INITIAL ENCOUNTER: Primary | ICD-10-CM

## 2025-04-16 LAB
ABSOLUTE EOSINOPHIL (OHS): 0.29 K/UL
ABSOLUTE MONOCYTE (OHS): 0.97 K/UL (ref 0.3–1)
ABSOLUTE NEUTROPHIL COUNT (OHS): 4.59 K/UL (ref 1.8–7.7)
ALBUMIN SERPL BCP-MCNC: 2.5 G/DL (ref 3.5–5.2)
ALP SERPL-CCNC: 137 UNIT/L (ref 40–150)
ALT SERPL W/O P-5'-P-CCNC: 18 UNIT/L (ref 10–44)
ANION GAP (OHS): 9 MMOL/L (ref 8–16)
AST SERPL-CCNC: 23 UNIT/L (ref 11–45)
BACTERIA #/AREA URNS AUTO: ABNORMAL /HPF
BASOPHILS # BLD AUTO: 0.03 K/UL
BASOPHILS NFR BLD AUTO: 0.4 %
BILIRUB SERPL-MCNC: 0.3 MG/DL (ref 0.1–1)
BILIRUB UR QL STRIP.AUTO: NEGATIVE
BUN SERPL-MCNC: 18 MG/DL (ref 8–23)
CALCIUM SERPL-MCNC: 8.3 MG/DL (ref 8.7–10.5)
CHLORIDE SERPL-SCNC: 97 MMOL/L (ref 95–110)
CLARITY UR: ABNORMAL
CO2 SERPL-SCNC: 24 MMOL/L (ref 23–29)
COLOR UR AUTO: ABNORMAL
CREAT SERPL-MCNC: 1.9 MG/DL (ref 0.5–1.4)
ERYTHROCYTE [DISTWIDTH] IN BLOOD BY AUTOMATED COUNT: 14.3 % (ref 11.5–14.5)
GFR SERPLBLD CREATININE-BSD FMLA CKD-EPI: 28 ML/MIN/1.73/M2
GLUCOSE SERPL-MCNC: 243 MG/DL (ref 70–110)
GLUCOSE UR QL STRIP: ABNORMAL
HCT VFR BLD AUTO: 37.4 % (ref 37–48.5)
HGB BLD-MCNC: 11.8 GM/DL (ref 12–16)
HGB UR QL STRIP: ABNORMAL
HIV 1+2 AB+HIV1 P24 AG SERPL QL IA: NORMAL
HOLD SPECIMEN: NORMAL
HYALINE CASTS UR QL AUTO: 0 /LPF (ref 0–1)
IMM GRANULOCYTES # BLD AUTO: 0.05 K/UL (ref 0–0.04)
IMM GRANULOCYTES NFR BLD AUTO: 0.7 % (ref 0–0.5)
KETONES UR QL STRIP: NEGATIVE
LEUKOCYTE ESTERASE UR QL STRIP: ABNORMAL
LYMPHOCYTES # BLD AUTO: 0.84 K/UL (ref 1–4.8)
MCH RBC QN AUTO: 29.7 PG (ref 27–31)
MCHC RBC AUTO-ENTMCNC: 31.6 G/DL (ref 32–36)
MCV RBC AUTO: 94 FL (ref 82–98)
MICROSCOPIC COMMENT: ABNORMAL
NITRITE UR QL STRIP: NEGATIVE
NUCLEATED RBC (/100WBC) (OHS): 0 /100 WBC
PH UR STRIP: 7 [PH]
PLATELET # BLD AUTO: 261 K/UL (ref 150–450)
PMV BLD AUTO: 9.8 FL (ref 9.2–12.9)
POTASSIUM SERPL-SCNC: 3.5 MMOL/L (ref 3.5–5.1)
PROT SERPL-MCNC: 6.9 GM/DL (ref 6–8.4)
PROT UR QL STRIP: ABNORMAL
RBC # BLD AUTO: 3.97 M/UL (ref 4–5.4)
RBC #/AREA URNS AUTO: >100 /HPF (ref 0–4)
RELATIVE EOSINOPHIL (OHS): 4.3 %
RELATIVE LYMPHOCYTE (OHS): 12.4 % (ref 18–48)
RELATIVE MONOCYTE (OHS): 14.3 % (ref 4–15)
RELATIVE NEUTROPHIL (OHS): 67.9 % (ref 38–73)
SODIUM SERPL-SCNC: 130 MMOL/L (ref 136–145)
SP GR UR STRIP: 1.02
SQUAMOUS #/AREA URNS AUTO: 4 /HPF
UROBILINOGEN UR STRIP-ACNC: NEGATIVE EU/DL
WBC # BLD AUTO: 6.77 K/UL (ref 3.9–12.7)
WBC #/AREA URNS AUTO: >100 /HPF (ref 0–5)
WBC CLUMPS UR QL AUTO: ABNORMAL
YEAST UR QL AUTO: ABNORMAL /HPF

## 2025-04-16 PROCEDURE — 94761 N-INVAS EAR/PLS OXIMETRY MLT: CPT | Mod: HCNC

## 2025-04-16 PROCEDURE — 25000003 PHARM REV CODE 250: Mod: HCNC

## 2025-04-16 PROCEDURE — 82040 ASSAY OF SERUM ALBUMIN: CPT | Mod: HCNC

## 2025-04-16 PROCEDURE — 81001 URINALYSIS AUTO W/SCOPE: CPT | Mod: HCNC

## 2025-04-16 PROCEDURE — 12000002 HC ACUTE/MED SURGE SEMI-PRIVATE ROOM: Mod: HCNC

## 2025-04-16 PROCEDURE — 87389 HIV-1 AG W/HIV-1&-2 AB AG IA: CPT | Mod: HCNC | Performed by: PHYSICIAN ASSISTANT

## 2025-04-16 PROCEDURE — 87086 URINE CULTURE/COLONY COUNT: CPT | Mod: HCNC

## 2025-04-16 PROCEDURE — 25000003 PHARM REV CODE 250: Mod: HCNC | Performed by: STUDENT IN AN ORGANIZED HEALTH CARE EDUCATION/TRAINING PROGRAM

## 2025-04-16 PROCEDURE — 63600175 PHARM REV CODE 636 W HCPCS: Mod: HCNC | Performed by: STUDENT IN AN ORGANIZED HEALTH CARE EDUCATION/TRAINING PROGRAM

## 2025-04-16 PROCEDURE — 99285 EMERGENCY DEPT VISIT HI MDM: CPT | Mod: HCNC

## 2025-04-16 PROCEDURE — 83036 HEMOGLOBIN GLYCOSYLATED A1C: CPT | Mod: HCNC | Performed by: HOSPITALIST

## 2025-04-16 PROCEDURE — 85025 COMPLETE CBC W/AUTO DIFF WBC: CPT | Mod: HCNC

## 2025-04-16 RX ORDER — TALC
6 POWDER (GRAM) TOPICAL NIGHTLY PRN
Status: CANCELLED | OUTPATIENT
Start: 2025-04-16

## 2025-04-16 RX ORDER — METHOCARBAMOL 750 MG/1
750 TABLET, FILM COATED ORAL
Status: COMPLETED | OUTPATIENT
Start: 2025-04-16 | End: 2025-04-16

## 2025-04-16 RX ORDER — SUMATRIPTAN SUCCINATE 25 MG/1
100 TABLET ORAL ONCE
Status: COMPLETED | OUTPATIENT
Start: 2025-04-16 | End: 2025-04-16

## 2025-04-16 RX ORDER — SODIUM CHLORIDE 0.9 % (FLUSH) 0.9 %
10 SYRINGE (ML) INJECTION
Status: CANCELLED | OUTPATIENT
Start: 2025-04-16

## 2025-04-16 RX ORDER — LACTULOSE 10 G/15ML
20 SOLUTION ORAL; RECTAL 2 TIMES DAILY PRN
COMMUNITY
Start: 2024-12-16

## 2025-04-16 RX ORDER — VANCOMYCIN 2 GRAM/500 ML IN 0.9 % SODIUM CHLORIDE INTRAVENOUS
20 ONCE
Status: COMPLETED | OUTPATIENT
Start: 2025-04-16 | End: 2025-04-17

## 2025-04-16 RX ADMIN — SUMATRIPTAN SUCCINATE 100 MG: 25 TABLET ORAL at 08:04

## 2025-04-16 RX ADMIN — SODIUM CHLORIDE 2000 MG: 9 INJECTION, SOLUTION INTRAVENOUS at 11:04

## 2025-04-16 RX ADMIN — METHOCARBAMOL 750 MG: 750 TABLET ORAL at 08:04

## 2025-04-16 NOTE — ED TRIAGE NOTES
Cecile Bowen, a 74 y.o. female presents to the ED w/ complaint of hematuria.     Pt stated that she noticed it last night. Has a permanent suprapubic catheter. Stated it was last changed 3 weeks and 6 days ago. Pt reports that this happens intermittently. Pt denies any new symptoms or pain. Only reports chronic back pain.     Pt also receives dialysis, MWF. Received full treatment today.     Triage note:  Chief Complaint   Patient presents with    Hematuria     Onset yesterday, from home  Non ambulatory     Review of patient's allergies indicates:  No Known Allergies  Past Medical History:   Diagnosis Date    Cervicogenic migraine     Chronic pain     CKD (chronic kidney disease) stage 4, GFR 15-29 ml/min     [facility]    CKD (chronic kidney disease) stage 4, GFR 15-29 ml/min     Diabetes mellitus     Long term use of Insulin, Diabetic Neuropathy    Dialysis patient 1/21/2022    Fibromyalgia     Hydronephrosis     Hyperlipidemia     Hypertension 12/12/2012    Hypothyroidism 12/12/2012    ARON (iron deficiency anemia)     Insomnia     Levoscoliosis     Malignant neoplasm of upper-outer quadrant of left breast in female, estrogen receptor positive     Metabolic acidosis     Mobility impaired     Nuclear sclerosis - Both Eyes 3/24/2014    VIJAYA (obstructive sleep apnea)     Osteopenia     Pulmonary nodule     Recurrent UTI     Renal manifestation of secondary diabetes mellitus     Secondary hyperparathyroidism, renal     Urinary retention

## 2025-04-16 NOTE — ED PROVIDER NOTES
Encounter Date: 4/16/2025       History     Chief Complaint   Patient presents with    Hematuria     Onset yesterday, from home  Non ambulatory     HPI  74 year old female with a PMHx of HTN, HLD, diabetes, CKD stage 4 (HD MWF), neurogenic bladder, and a suprapubic catheter presents with hematuria. Patient states that she first noticed blood in her urine last night and explains that this is usually how a UTI presents for her, she denies any recent trauma. Of note, patient was recently treated for Enterococcus bacteremia and suprapubic UTI. Urine cultures speciated ESBL Klebsiella and Proteus. She was treated with 8 weeks of Vancomycin. Patient explains she changes her catheter every month and was due to change it today. Patient denies any abdominal pain, flank pain, or urinary symptoms.    Review of patient's allergies indicates:  No Known Allergies  Past Medical History:   Diagnosis Date    Cervicogenic migraine     Chronic pain     CKD (chronic kidney disease) stage 4, GFR 15-29 ml/min     [facility]    CKD (chronic kidney disease) stage 4, GFR 15-29 ml/min     Diabetes mellitus     Long term use of Insulin, Diabetic Neuropathy    Dialysis patient 1/21/2022    Fibromyalgia     Hydronephrosis     Hyperlipidemia     Hypertension 12/12/2012    Hypothyroidism 12/12/2012    ARON (iron deficiency anemia)     Insomnia     Levoscoliosis     Malignant neoplasm of upper-outer quadrant of left breast in female, estrogen receptor positive     Metabolic acidosis     Mobility impaired     Nuclear sclerosis - Both Eyes 3/24/2014    VIJAYA (obstructive sleep apnea)     Osteopenia     Pulmonary nodule     Recurrent UTI     Renal manifestation of secondary diabetes mellitus     Secondary hyperparathyroidism, renal     Urinary retention      Past Surgical History:   Procedure Laterality Date    ANGIOGRAPHY OF UPPER EXTREMITY N/A 9/19/2024    Procedure: Angiogram Extremity Bilateral;  Surgeon: RODRIGO Friedman III, MD;  Location:  [facility] OR 2ND FLR;  Service: Vascular;  Laterality: N/A;  R femoral approach, arch & arm angiogram  168.76mgy  33.1145 gycm2  8.9min    AV FISTULA PLACEMENT Left 2/14/2024    Procedure: CREATION, AV FISTULA;  Surgeon: RODRIGO Friedman III, MD;  Location: [facility] OR 2ND FLR;  Service: Vascular;  Laterality: Left;  LUE AVF creation vs AVG insertion    BIOPSY OF URETER Left 2/18/2022    Procedure: BIOPSY, URETER;  Surgeon: Ronel Whittington MD;  Location: [facility] OR 1ST FLR;  Service: Urology;  Laterality: Left;    BREAST BIOPSY Right     benign    CHOLECYSTECTOMY      COLONOSCOPY N/A 1/13/2017    Procedure: COLONOSCOPY;  Surgeon: Morris Wiseman MD;  Location: [facility] ENDO (4TH FLR);  Service: Endoscopy;  Laterality: N/A;  Renal pt Nephrology advised to avoid phosphate preps    COLONOSCOPY N/A 12/7/2023    Procedure: COLONOSCOPY;  Surgeon: Herve Allen MD;  Location: [facility] ENDO (2ND FLR);  Service: Endoscopy;  Laterality: N/A;  HD MWF; labs prior  suprapubic catheter  pt does not ambulate-uses christina lift- will have sling with her  9/7 ref. by Anastasiia Campbell DO, PEG, instr. mailed, Eliquis hold approval pending-[facility] to hold Eliquis 2 days per Dr Navarro-GT  1/30-precall complete-MS    CYSTOSCOPY N/A 10/8/2018    Procedure: CYSTOSCOPY;  Surgeon: Ronel Whittington MD;  [facility]  Service: Urology;  Laterality: N/A;  45 min    CYSTOSCOPY N/A 3/25/2019    Procedure: CYSTOSCOPY;  Surgeon: Ronel Whittington MD;  [facility];  Service: Urology;  Laterality: N/A;  45 min    CYSTOSCOPY N/A 8/26/2019    Procedure: CYSTOSCOPY;  Surgeon: Ronel Whittington MD;  [facility];  Service: Urology;  Laterality: N/A;  45 min    CYSTOSCOPY N/A 7/2/2021    Procedure: CYSTOSCOPY;  Surgeon: Ronel Whittington MD;  Location: [facility] OR [address] [place] [address];  Service: Urology;  Laterality: N/A;    CYSTOSCOPY  12/23/2021    Procedure: CYSTOSCOPY;  Surgeon: Ronel Whittington MD;   Location: [facility] OR [address] [place] [address];  Service: Urology;;    CYSTOSCOPY  2/18/2022    Procedure: CYSTOSCOPY;  Surgeon: Ronel Whittington MD;  Location: [facility] OR [address] [place] [address];  Service: Urology;;    CYSTOSCOPY W/ URETERAL STENT PLACEMENT Left 5/15/2021    Procedure: CYSTOSCOPY, WITH URETERAL STENT INSERTION;  Surgeon: Levy Sánchez Jr., MD;  Location: [facility] OR [address] [place] [address];  Service: Urology;  Laterality: Left;    CYSTOSCOPY WITH BIOPSY OF BLADDER N/A 1/27/2020    Procedure: CYSTOSCOPY, WITH BLADDER BIOPSY, WITH FULGURATION IF INDICATED;  Surgeon: Ronel Whittington MD;  Location: [facility] OR [address] [place] [address];  Service: Urology;  Laterality: N/A;    DILATION AND CURETTAGE OF UTERUS      FISTULOGRAM N/A 4/29/2024    Procedure: Fistulogram;  Surgeon: RODRIGO Friedman III, MD;  Location: [facility] CATH LAB;  Service: Peripheral Vascular;  Laterality: N/A;    FISTULOGRAM Left 1/6/2025    Procedure: Fistulogram;  Surgeon: RODRIGO Friedman III, MD;  Location: [facility] CATH LAB;  Service: Peripheral Vascular;  Laterality: Left;    GALLBLADDER SURGERY  2006    HYSTERECTOMY      INJECTION FOR SENTINEL NODE IDENTIFICATION Left 8/1/2019    Procedure: INJECTION, FOR SENTINEL NODE IDENTIFICATION;  Surgeon: Samia Fulton MD;  Location: [facility] OR [address] [place] [address];  Service: General;  Laterality: Left;    INJECTION OF BOTULINUM TOXIN TYPE A N/A 10/8/2018    Procedure: INJECTION, BOTULINUM TOXIN, TYPE A 300 UNITS;  Surgeon: Ronel Whittington MD;  Location: [facility] OR [address] [place] [address];  Service: Urology;  Laterality: N/A;    INJECTION OF BOTULINUM TOXIN TYPE A N/A 3/25/2019    Procedure: INJECTION, BOTULINUM TOXIN, TYPE A 300 UNITS;  Surgeon: Ronel Whittington MD;  Location: [facility] OR [address] [place] [address];  Service: Urology;  Laterality: N/A;    INJECTION OF BOTULINUM TOXIN TYPE A N/A 8/26/2019    Procedure: INJECTION, BOTULINUM TOXIN, TYPE A 300 UNITS;  Surgeon: Ronel Whititngton MD;  Location: [facility] OR [address] [place] [address];  Service: Urology;  Laterality: N/A;    MASTECTOMY Left 8/1/2019    Procedure:  MASTECTOMY 23 hour stay;  Surgeon: Samia Fulton MD;  [facility];  Service: General;  Laterality: Left;    MEDIPORT REMOVAL Right 6/24/2022    Procedure: REMOVAL, CATHETER, CENTRAL VENOUS, TUNNELED, WITH PORT;  Surgeon: Isauro Anderson MD;  Location: [facility] CATH LAB;  Service: Radiology;  Laterality: Right;    OVARIAN CYST SURGERY  1985    PERCUTANEOUS TRANSLUMINAL ANGIOPLASTY OF ARTERIOVENOUS FISTULA Left 4/29/2024    Procedure: PTA, AV FISTULA;  Surgeon: RODRIGO Friedman III, MD;  Location: [facility] CATH LAB;  Service: Peripheral Vascular;  Laterality: Left;    REPLACEMENT OF STENT Left 12/23/2021    Procedure: REPLACEMENT, STENT;  Surgeon: Ronel Whittington MD;  Location: [facility] OR [address] [place] [address];  Service: Urology;  Laterality: Left;    REPLACEMENT OF STENT Left 2/18/2022    Procedure: REPLACEMENT, STENT;  Surgeon: Ronel Whittington MD;  Location: [facility] OR [address] [place] [address];  Service: Urology;  Laterality: Left;    RETROGRADE PYELOGRAPHY Left 2/18/2022    Procedure: PYELOGRAM, RETROGRADE;  Surgeon: Ronel Whittington MD;  Location: [facility] OR [address] [place] [address];  Service: Urology;  Laterality: Left;    SENTINEL LYMPH NODE BIOPSY Left 8/1/2019    Procedure: BIOPSY, LYMPH NODE, SENTINEL;  Surgeon: Samia Fulton MD;  [facility];  Service: General;  Laterality: Left;    spt placement      TONSILLECTOMY, ADENOIDECTOMY      TOTAL ABDOMINAL HYSTERECTOMY W/ BILATERAL SALPINGOOPHORECTOMY  1985    URETEROSCOPY Left 2/18/2022    Procedure: URETEROSCOPY;  Surgeon: Ronel Whittington MD;  Location: [facility] OR [address] [place] [address];  Service: Urology;  Laterality: Left;     Family History   Problem Relation Name Age of Onset    Diabetes Sister Kat     Kidney disease Sister Kat         CKD III    ALS Mother          d.    Cancer Maternal Grandmother          d. colon    Cancer Paternal Grandfather          d. lung    Scoliosis Brother [place]         increased pain    Prostate cancer Brother [place]         cured s/p surgery     Cancer Brother [place]         rare cancer that got into the bones    Diabetes Maternal Aunt      Kidney disease Maternal Aunt      Diabetes Maternal Uncle      Amblyopia Neg Hx      Blindness Neg Hx      Cataracts Neg Hx      Glaucoma Neg Hx      Macular degeneration Neg Hx      Retinal detachment Neg Hx      Strabismus Neg Hx       Social History[1]  Review of Systems   Constitutional:  Negative for activity change, appetite change, chills, diaphoresis, fatigue and fever.   Respiratory:  Negative for cough, chest tightness and shortness of breath.    Cardiovascular:  Positive for leg swelling. Negative for chest pain.   Gastrointestinal:  Positive for constipation. Negative for abdominal distention, abdominal pain, diarrhea, nausea and vomiting.   Genitourinary:  Positive for hematuria. Negative for dysuria, flank pain and pelvic pain.       Physical Exam     Initial Vitals [04/16/25 1659]   BP Pulse Resp Temp SpO2   (!) 152/90 84 17 98.2 °F (36.8 °C) 100 %      MAP       --         Physical Exam    Constitutional: She appears well-developed and well-nourished. She is not diaphoretic. No distress.   HENT:   Head: Normocephalic and atraumatic.   Eyes: Conjunctivae are normal.   Neck: Neck supple.   Normal range of motion.  Cardiovascular:  Normal rate and regular rhythm.           Pulmonary/Chest: Breath sounds normal. No respiratory distress.   Abdominal: Abdomen is soft. Bowel sounds are normal. She exhibits no distension. There is no abdominal tenderness.   Genitourinary:    Genitourinary Comments: Gross hematuria present. Suprapubic catheter in place with some sanguinous drainage noted around the catheter insertion site.      Musculoskeletal:         General: Edema present. No tenderness.      Cervical back: Normal range of motion and neck supple.      Comments: Patient with significant swelling in the lower extremities R>L     Neurological: She is alert and oriented to person, place, and time.   Psychiatric: She  has a normal mood and affect.       ED Course   Procedures  Labs Reviewed   COMPREHENSIVE METABOLIC PANEL - Abnormal       Result Value    Sodium 130 (*)     Potassium 3.5      Chloride 97      CO2 24      Glucose 243 (*)     BUN 18      Creatinine 1.9 (*)     Calcium 8.3 (*)     Protein Total 6.9      Albumin 2.5 (*)     Bilirubin Total 0.3            AST 23      ALT 18      Anion Gap 9      eGFR 28 (*)    URINALYSIS, REFLEX TO URINE CULTURE - Abnormal    Color, UA Orange (*)     Appearance, UA Cloudy (*)     pH, UA 7.0      Spec Grav UA 1.020      Protein, UA 3+ (*)     Glucose, UA 2+ (*)     Ketones, UA Negative      Bilirubin, UA Negative      Blood, UA 3+ (*)     Nitrites, UA Negative      Urobilinogen, UA Negative      Leukocyte Esterase, UA 3+ (*)    CBC WITH DIFFERENTIAL - Abnormal    WBC 6.77      RBC 3.97 (*)     HGB 11.8 (*)     HCT 37.4      MCV 94      MCH 29.7      MCHC 31.6 (*)     RDW 14.3      Platelet Count 261      MPV 9.8      Nucleated RBC 0      Neut % 67.9      Lymph % 12.4 (*)     Mono % 14.3      Eos % 4.3      Basophil % 0.4      Imm Grans % 0.7 (*)     Neut # 4.59      Lymph # 0.84 (*)     Mono # 0.97      Eos # 0.29      Baso # 0.03      Imm Grans # 0.05 (*)    URINALYSIS MICROSCOPIC - Abnormal    RBC, UA >100 (*)     WBC, UA >100 (*)     WBC Clumps, UA Moderate (*)     Bacteria, UA Many (*)     Yeast, UA Rare (*)     Squamous Epithelial Cells, UA 4      Hyaline Casts, UA 0      Microscopic Comment       HIV 1 / 2 ANTIBODY - Normal    HIV 1/2 Ag/Ab Non-Reactive     CULTURE, URINE   CBC W/ AUTO DIFFERENTIAL    Narrative:     The following orders were created for panel order CBC auto differential.  Procedure                               Abnormality         Status                     ---------                               -----------         ------                     CBC with Differential[[id number]]       Abnormal            Final result                 Please view results for  these tests on the individual orders.   GREY TOP URINE HOLD   GENTAMICIN, PEAK          Imaging Results    None          Medications   gentamicin - pharmacy to dose (has no administration in time range)   vancomycin - pharmacy to dose (has no administration in time range)   gentamicin (GARAMYCIN) 154 mg in 0.9% NaCl 100 mL IVPB (has no administration in time range)   vancomycin 2 g in 0.9% sodium chloride 500 mL IVPB (has no administration in time range)   methocarbamoL tablet 750 mg (750 mg Oral Given 4/16/25 2059)   sumatriptan tablet 100 mg (100 mg Oral Given 4/16/25 2059)     Medical Decision Making  74 year old female with a PMHx of HTN, HLD, diabetes, CKD stage 4 (HD MWF), neurogenic bladder, and a suprapubic catheter presents with hematuria. Patient presents with no other urinary symptoms. Sanguinous drainage noted at the suprapubic catheter site. Labs including urinalysis ordered. Urinalysis demonstrates UTI. Ordered Vancomycin and Gentamicin based on previous cultures. Admit to hospital medicine.    Amount and/or Complexity of Data Reviewed  Labs: ordered.    Risk  Prescription drug management.  Decision regarding hospitalization.                              Clinical Impression:  Final diagnoses:  [T83.511A, N39.0] Urinary tract infection associated with indwelling urethral catheter, initial encounter (Primary)        ED Disposition Condition    Admit                   Chantel Lopez MD  Resident  04/16/25 8704         [1]   Social History  Tobacco Use    Smoking status: Never    Smokeless tobacco: Never   Substance Use Topics    Alcohol use: No     Alcohol/week: 0.0 standard drinks of alcohol    Drug use: No        Chantel Lopez MD  Resident  04/17/25 0705

## 2025-04-17 VITALS
TEMPERATURE: 98 F | OXYGEN SATURATION: 96 % | RESPIRATION RATE: 18 BRPM | WEIGHT: 242.5 LBS | HEART RATE: 66 BPM | BODY MASS INDEX: 36.75 KG/M2 | SYSTOLIC BLOOD PRESSURE: 119 MMHG | DIASTOLIC BLOOD PRESSURE: 73 MMHG | HEIGHT: 68 IN

## 2025-04-17 PROBLEM — N18.9 CHRONIC KIDNEY DISEASE: Status: RESOLVED | Noted: 2022-01-25 | Resolved: 2025-04-17

## 2025-04-17 PROBLEM — B95.2 BACTEREMIA DUE TO ENTEROCOCCUS: Status: RESOLVED | Noted: 2021-09-28 | Resolved: 2025-04-17

## 2025-04-17 PROBLEM — R74.8 ELEVATED ALKALINE PHOSPHATASE LEVEL: Status: RESOLVED | Noted: 2024-01-02 | Resolved: 2025-04-17

## 2025-04-17 PROBLEM — N30.01 ACUTE CYSTITIS WITH HEMATURIA: Status: RESOLVED | Noted: 2022-02-11 | Resolved: 2025-04-17

## 2025-04-17 PROBLEM — R31.9 HEMATURIA: Status: ACTIVE | Noted: 2025-04-17

## 2025-04-17 PROBLEM — E87.70 VOLUME OVERLOAD: Status: RESOLVED | Noted: 2023-08-17 | Resolved: 2025-04-17

## 2025-04-17 PROBLEM — I10 PRIMARY HYPERTENSION: Chronic | Status: ACTIVE | Noted: 2024-06-12

## 2025-04-17 PROBLEM — Z93.59 CHRONIC SUPRAPUBIC CATHETER: Status: ACTIVE | Noted: 2025-04-17

## 2025-04-17 PROBLEM — T83.511A CATHETER-ASSOCIATED URINARY TRACT INFECTION: Status: RESOLVED | Noted: 2021-09-29 | Resolved: 2025-04-17

## 2025-04-17 PROBLEM — R65.20 SEVERE SEPSIS: Status: RESOLVED | Noted: 2024-06-12 | Resolved: 2025-04-17

## 2025-04-17 PROBLEM — R82.71 BACTERIA IN URINE: Status: RESOLVED | Noted: 2023-08-06 | Resolved: 2025-04-17

## 2025-04-17 PROBLEM — N30.01 ACUTE CYSTITIS WITH HEMATURIA: Status: ACTIVE | Noted: 2025-04-17

## 2025-04-17 PROBLEM — R74.01 TRANSAMINITIS: Status: RESOLVED | Noted: 2021-09-05 | Resolved: 2025-04-17

## 2025-04-17 PROBLEM — L24.A2 IRRITANT CONTACT DERMATITIS DUE TO FECAL, URINARY OR DUAL INCONTINENCE: Status: RESOLVED | Noted: 2023-08-09 | Resolved: 2025-04-17

## 2025-04-17 PROBLEM — Z51.5 ENCOUNTER FOR PALLIATIVE CARE: Status: RESOLVED | Noted: 2021-12-01 | Resolved: 2025-04-17

## 2025-04-17 PROBLEM — E87.5 HYPERKALEMIA: Status: RESOLVED | Noted: 2021-07-04 | Resolved: 2025-04-17

## 2025-04-17 PROBLEM — R79.89 ELEVATED LFTS: Status: RESOLVED | Noted: 2023-10-31 | Resolved: 2025-04-17

## 2025-04-17 PROBLEM — K59.00 CONSTIPATION: Status: RESOLVED | Noted: 2021-12-27 | Resolved: 2025-04-17

## 2025-04-17 PROBLEM — A41.9 SEVERE SEPSIS: Status: RESOLVED | Noted: 2024-06-12 | Resolved: 2025-04-17

## 2025-04-17 PROBLEM — N39.0 CATHETER-ASSOCIATED URINARY TRACT INFECTION: Status: RESOLVED | Noted: 2021-09-29 | Resolved: 2025-04-17

## 2025-04-17 PROBLEM — R78.81 BACTEREMIA DUE TO ENTEROCOCCUS: Status: RESOLVED | Noted: 2021-09-28 | Resolved: 2025-04-17

## 2025-04-17 PROBLEM — Z79.4 LONG TERM CURRENT USE OF INSULIN: Status: RESOLVED | Noted: 2021-06-05 | Resolved: 2025-04-17

## 2025-04-17 LAB
ABSOLUTE EOSINOPHIL (OHS): 0.25 K/UL
ABSOLUTE MONOCYTE (OHS): 0.93 K/UL (ref 0.3–1)
ABSOLUTE NEUTROPHIL COUNT (OHS): 4.81 K/UL (ref 1.8–7.7)
ALBUMIN SERPL BCP-MCNC: 2.2 G/DL (ref 3.5–5.2)
ANION GAP (OHS): 8 MMOL/L (ref 8–16)
APTT PPP: 32.5 SECONDS (ref 21–32)
BASOPHILS # BLD AUTO: 0.03 K/UL
BASOPHILS NFR BLD AUTO: 0.4 %
BUN SERPL-MCNC: 23 MG/DL (ref 8–23)
CALCIUM SERPL-MCNC: 8.1 MG/DL (ref 8.7–10.5)
CHLORIDE SERPL-SCNC: 101 MMOL/L (ref 95–110)
CO2 SERPL-SCNC: 22 MMOL/L (ref 23–29)
CREAT SERPL-MCNC: 2.1 MG/DL (ref 0.5–1.4)
EAG (OHS): 108 MG/DL (ref 68–131)
ERYTHROCYTE [DISTWIDTH] IN BLOOD BY AUTOMATED COUNT: 14 % (ref 11.5–14.5)
GENTAMICIN PEAK SERPL-MCNC: 5.9 UG/ML (ref 5–30)
GFR SERPLBLD CREATININE-BSD FMLA CKD-EPI: 25 ML/MIN/1.73/M2
GLUCOSE SERPL-MCNC: 139 MG/DL (ref 70–110)
GRAM STN SPEC: NORMAL
HBA1C MFR BLD: 5.4 % (ref 4–5.6)
HCT VFR BLD AUTO: 37.3 % (ref 37–48.5)
HGB BLD-MCNC: 11.4 GM/DL (ref 12–16)
IMM GRANULOCYTES # BLD AUTO: 0.08 K/UL (ref 0–0.04)
IMM GRANULOCYTES NFR BLD AUTO: 1.1 % (ref 0–0.5)
INDIRECT COOMBS: NORMAL
INR PPP: 1 (ref 0.8–1.2)
LYMPHOCYTES # BLD AUTO: 1.08 K/UL (ref 1–4.8)
MAGNESIUM SERPL-MCNC: 1.7 MG/DL (ref 1.6–2.6)
MCH RBC QN AUTO: 29 PG (ref 27–31)
MCHC RBC AUTO-ENTMCNC: 30.6 G/DL (ref 32–36)
MCV RBC AUTO: 95 FL (ref 82–98)
NUCLEATED RBC (/100WBC) (OHS): 0 /100 WBC
PHOSPHATE SERPL-MCNC: 2.9 MG/DL (ref 2.7–4.5)
PLATELET # BLD AUTO: 254 K/UL (ref 150–450)
PMV BLD AUTO: 8.9 FL (ref 9.2–12.9)
POCT GLUCOSE: 118 MG/DL (ref 70–110)
POCT GLUCOSE: 151 MG/DL (ref 70–110)
POCT GLUCOSE: 156 MG/DL (ref 70–110)
POCT GLUCOSE: 192 MG/DL (ref 70–110)
POTASSIUM SERPL-SCNC: 3.6 MMOL/L (ref 3.5–5.1)
PROTHROMBIN TIME: 11.2 SECONDS (ref 9–12.5)
RBC # BLD AUTO: 3.93 M/UL (ref 4–5.4)
RELATIVE EOSINOPHIL (OHS): 3.5 %
RELATIVE LYMPHOCYTE (OHS): 15 % (ref 18–48)
RELATIVE MONOCYTE (OHS): 13 % (ref 4–15)
RELATIVE NEUTROPHIL (OHS): 67 % (ref 38–73)
RH BLD: NORMAL
SODIUM SERPL-SCNC: 131 MMOL/L (ref 136–145)
SPECIMEN OUTDATE: NORMAL
WBC # BLD AUTO: 7.18 K/UL (ref 3.9–12.7)

## 2025-04-17 PROCEDURE — 25000003 PHARM REV CODE 250: Mod: HCNC | Performed by: STUDENT IN AN ORGANIZED HEALTH CARE EDUCATION/TRAINING PROGRAM

## 2025-04-17 PROCEDURE — 25000003 PHARM REV CODE 250: Mod: HCNC | Performed by: HOSPITALIST

## 2025-04-17 PROCEDURE — 63600175 PHARM REV CODE 636 W HCPCS: Mod: HCNC | Performed by: STUDENT IN AN ORGANIZED HEALTH CARE EDUCATION/TRAINING PROGRAM

## 2025-04-17 PROCEDURE — 5A1D70Z PERFORMANCE OF URINARY FILTRATION, INTERMITTENT, LESS THAN 6 HOURS PER DAY: ICD-10-PCS | Performed by: HOSPITALIST

## 2025-04-17 PROCEDURE — 86901 BLOOD TYPING SEROLOGIC RH(D): CPT | Mod: HCNC | Performed by: HOSPITALIST

## 2025-04-17 PROCEDURE — 63600175 PHARM REV CODE 636 W HCPCS: Mod: HCNC | Performed by: HOSPITALIST

## 2025-04-17 PROCEDURE — 80170 ASSAY OF GENTAMICIN: CPT | Mod: HCNC | Performed by: STUDENT IN AN ORGANIZED HEALTH CARE EDUCATION/TRAINING PROGRAM

## 2025-04-17 PROCEDURE — 85610 PROTHROMBIN TIME: CPT | Mod: HCNC | Performed by: HOSPITALIST

## 2025-04-17 PROCEDURE — 85025 COMPLETE CBC W/AUTO DIFF WBC: CPT | Mod: HCNC | Performed by: HOSPITALIST

## 2025-04-17 PROCEDURE — 82310 ASSAY OF CALCIUM: CPT | Mod: HCNC | Performed by: HOSPITALIST

## 2025-04-17 PROCEDURE — 87205 SMEAR GRAM STAIN: CPT | Mod: HCNC | Performed by: HOSPITALIST

## 2025-04-17 PROCEDURE — 83735 ASSAY OF MAGNESIUM: CPT | Mod: HCNC | Performed by: HOSPITALIST

## 2025-04-17 PROCEDURE — 85730 THROMBOPLASTIN TIME PARTIAL: CPT | Mod: HCNC | Performed by: HOSPITALIST

## 2025-04-17 RX ORDER — NALOXONE HCL 0.4 MG/ML
0.02 VIAL (ML) INJECTION
Status: DISCONTINUED | OUTPATIENT
Start: 2025-04-17 | End: 2025-04-17 | Stop reason: HOSPADM

## 2025-04-17 RX ORDER — ONDANSETRON HYDROCHLORIDE 2 MG/ML
4 INJECTION, SOLUTION INTRAVENOUS EVERY 8 HOURS PRN
Status: DISCONTINUED | OUTPATIENT
Start: 2025-04-17 | End: 2025-04-17 | Stop reason: HOSPADM

## 2025-04-17 RX ORDER — SODIUM CHLORIDE 0.9 % (FLUSH) 0.9 %
10 SYRINGE (ML) INJECTION EVERY 6 HOURS PRN
Status: DISCONTINUED | OUTPATIENT
Start: 2025-04-17 | End: 2025-04-17 | Stop reason: HOSPADM

## 2025-04-17 RX ORDER — IBUPROFEN 200 MG
16 TABLET ORAL
Status: DISCONTINUED | OUTPATIENT
Start: 2025-04-17 | End: 2025-04-17 | Stop reason: HOSPADM

## 2025-04-17 RX ORDER — IPRATROPIUM BROMIDE 42 UG/1
2 SPRAY, METERED NASAL 2 TIMES DAILY
Status: DISCONTINUED | OUTPATIENT
Start: 2025-04-17 | End: 2025-04-17 | Stop reason: HOSPADM

## 2025-04-17 RX ORDER — ERGOCALCIFEROL 1.25 MG/1
50000 CAPSULE ORAL
Status: DISCONTINUED | OUTPATIENT
Start: 2025-04-22 | End: 2025-04-17 | Stop reason: HOSPADM

## 2025-04-17 RX ORDER — TALC
6 POWDER (GRAM) TOPICAL NIGHTLY PRN
Status: DISCONTINUED | OUTPATIENT
Start: 2025-04-17 | End: 2025-04-17 | Stop reason: HOSPADM

## 2025-04-17 RX ORDER — AMLODIPINE BESYLATE 10 MG/1
10 TABLET ORAL DAILY
Status: DISCONTINUED | OUTPATIENT
Start: 2025-04-17 | End: 2025-04-17 | Stop reason: HOSPADM

## 2025-04-17 RX ORDER — OXYBUTYNIN CHLORIDE 10 MG/1
10 TABLET, EXTENDED RELEASE ORAL DAILY
Status: DISCONTINUED | OUTPATIENT
Start: 2025-04-17 | End: 2025-04-17 | Stop reason: HOSPADM

## 2025-04-17 RX ORDER — LANOLIN ALCOHOL/MO/W.PET/CERES
400 CREAM (GRAM) TOPICAL DAILY
Status: DISCONTINUED | OUTPATIENT
Start: 2025-04-17 | End: 2025-04-17 | Stop reason: HOSPADM

## 2025-04-17 RX ORDER — LEVOTHYROXINE SODIUM 50 UG/1
50 TABLET ORAL
Status: DISCONTINUED | OUTPATIENT
Start: 2025-04-17 | End: 2025-04-17 | Stop reason: HOSPADM

## 2025-04-17 RX ORDER — SUCROFERRIC OXYHYDROXIDE 500 MG/1
TABLET, CHEWABLE ORAL
COMMUNITY

## 2025-04-17 RX ORDER — LOSARTAN POTASSIUM 50 MG/1
100 TABLET ORAL DAILY
Status: DISCONTINUED | OUTPATIENT
Start: 2025-04-17 | End: 2025-04-17 | Stop reason: HOSPADM

## 2025-04-17 RX ORDER — DICLOFENAC SODIUM 10 MG/G
2 GEL TOPICAL 2 TIMES DAILY PRN
Status: DISCONTINUED | OUTPATIENT
Start: 2025-04-17 | End: 2025-04-17 | Stop reason: HOSPADM

## 2025-04-17 RX ORDER — GLUCAGON 1 MG
1 KIT INJECTION
Status: DISCONTINUED | OUTPATIENT
Start: 2025-04-17 | End: 2025-04-17 | Stop reason: HOSPADM

## 2025-04-17 RX ORDER — SUMATRIPTAN SUCCINATE 50 MG/1
100 TABLET ORAL
Status: DISCONTINUED | OUTPATIENT
Start: 2025-04-17 | End: 2025-04-17 | Stop reason: HOSPADM

## 2025-04-17 RX ORDER — SODIUM CHLORIDE 9 MG/ML
INJECTION, SOLUTION INTRAVENOUS ONCE
Status: CANCELLED | OUTPATIENT
Start: 2025-04-18

## 2025-04-17 RX ORDER — METHOCARBAMOL 750 MG/1
1500 TABLET, FILM COATED ORAL 3 TIMES DAILY
Status: DISCONTINUED | OUTPATIENT
Start: 2025-04-17 | End: 2025-04-17 | Stop reason: HOSPADM

## 2025-04-17 RX ORDER — DULOXETIN HYDROCHLORIDE 20 MG/1
40 CAPSULE, DELAYED RELEASE ORAL DAILY
Status: DISCONTINUED | OUTPATIENT
Start: 2025-04-17 | End: 2025-04-17 | Stop reason: HOSPADM

## 2025-04-17 RX ORDER — OXYCODONE HYDROCHLORIDE 5 MG/1
5 TABLET ORAL DAILY PRN
Refills: 0 | Status: DISCONTINUED | OUTPATIENT
Start: 2025-04-17 | End: 2025-04-17 | Stop reason: HOSPADM

## 2025-04-17 RX ORDER — ALUMINUM HYDROXIDE, MAGNESIUM HYDROXIDE, AND SIMETHICONE 1200; 120; 1200 MG/30ML; MG/30ML; MG/30ML
30 SUSPENSION ORAL 4 TIMES DAILY PRN
Status: DISCONTINUED | OUTPATIENT
Start: 2025-04-17 | End: 2025-04-17 | Stop reason: HOSPADM

## 2025-04-17 RX ORDER — HYDROCODONE BITARTRATE AND ACETAMINOPHEN 10; 325 MG/1; MG/1
1 TABLET ORAL EVERY 6 HOURS PRN
Refills: 0 | Status: DISCONTINUED | OUTPATIENT
Start: 2025-04-17 | End: 2025-04-17 | Stop reason: HOSPADM

## 2025-04-17 RX ORDER — PROCHLORPERAZINE EDISYLATE 5 MG/ML
5 INJECTION INTRAMUSCULAR; INTRAVENOUS EVERY 6 HOURS PRN
Status: DISCONTINUED | OUTPATIENT
Start: 2025-04-17 | End: 2025-04-17 | Stop reason: HOSPADM

## 2025-04-17 RX ORDER — ACETAMINOPHEN 325 MG/1
650 TABLET ORAL EVERY 4 HOURS PRN
Status: DISCONTINUED | OUTPATIENT
Start: 2025-04-17 | End: 2025-04-17 | Stop reason: HOSPADM

## 2025-04-17 RX ORDER — INSULIN ASPART 100 [IU]/ML
0-5 INJECTION, SOLUTION INTRAVENOUS; SUBCUTANEOUS
Status: DISCONTINUED | OUTPATIENT
Start: 2025-04-17 | End: 2025-04-17 | Stop reason: HOSPADM

## 2025-04-17 RX ORDER — TRAZODONE HYDROCHLORIDE 100 MG/1
100 TABLET ORAL NIGHTLY
Status: DISCONTINUED | OUTPATIENT
Start: 2025-04-17 | End: 2025-04-17 | Stop reason: HOSPADM

## 2025-04-17 RX ORDER — INSULIN GLARGINE 100 [IU]/ML
10 INJECTION, SOLUTION SUBCUTANEOUS NIGHTLY
Status: DISCONTINUED | OUTPATIENT
Start: 2025-04-17 | End: 2025-04-17 | Stop reason: HOSPADM

## 2025-04-17 RX ORDER — ANASTROZOLE 1 MG/1
1 TABLET ORAL DAILY
Status: DISCONTINUED | OUTPATIENT
Start: 2025-04-17 | End: 2025-04-17 | Stop reason: HOSPADM

## 2025-04-17 RX ORDER — AMOXICILLIN 250 MG
1 CAPSULE ORAL 2 TIMES DAILY PRN
Status: DISCONTINUED | OUTPATIENT
Start: 2025-04-17 | End: 2025-04-17 | Stop reason: HOSPADM

## 2025-04-17 RX ORDER — CARVEDILOL 12.5 MG/1
12.5 TABLET ORAL 2 TIMES DAILY WITH MEALS
Status: DISCONTINUED | OUTPATIENT
Start: 2025-04-17 | End: 2025-04-17 | Stop reason: HOSPADM

## 2025-04-17 RX ORDER — CALCIUM ACETATE 667 MG/1
667 CAPSULE ORAL
Status: DISCONTINUED | OUTPATIENT
Start: 2025-04-17 | End: 2025-04-17 | Stop reason: HOSPADM

## 2025-04-17 RX ORDER — IBUPROFEN 200 MG
24 TABLET ORAL
Status: DISCONTINUED | OUTPATIENT
Start: 2025-04-17 | End: 2025-04-17 | Stop reason: HOSPADM

## 2025-04-17 RX ORDER — FUROSEMIDE 20 MG/1
80 TABLET ORAL DAILY
Status: DISCONTINUED | OUTPATIENT
Start: 2025-04-17 | End: 2025-04-17 | Stop reason: HOSPADM

## 2025-04-17 RX ADMIN — LEVOTHYROXINE SODIUM 50 MCG: 0.05 TABLET ORAL at 05:04

## 2025-04-17 RX ADMIN — Medication 400 MG: at 08:04

## 2025-04-17 RX ADMIN — CARVEDILOL 12.5 MG: 12.5 TABLET, FILM COATED ORAL at 05:04

## 2025-04-17 RX ADMIN — METHOCARBAMOL 1500 MG: 500 TABLET ORAL at 05:04

## 2025-04-17 RX ADMIN — INSULIN GLARGINE 10 UNITS: 100 INJECTION, SOLUTION SUBCUTANEOUS at 04:04

## 2025-04-17 RX ADMIN — LOSARTAN POTASSIUM 100 MG: 50 TABLET, FILM COATED ORAL at 08:04

## 2025-04-17 RX ADMIN — GENTAMICIN SULFATE 154 MG: 40 INJECTION, SOLUTION INTRAMUSCULAR; INTRAVENOUS at 01:04

## 2025-04-17 RX ADMIN — OXYCODONE 5 MG: 5 TABLET ORAL at 04:04

## 2025-04-17 RX ADMIN — OXYBUTYNIN CHLORIDE 10 MG: 10 TABLET, EXTENDED RELEASE ORAL at 08:04

## 2025-04-17 RX ADMIN — METHOCARBAMOL 1500 MG: 500 TABLET ORAL at 08:04

## 2025-04-17 RX ADMIN — HYDROCODONE BITARTRATE AND ACETAMINOPHEN 1 TABLET: 10; 325 TABLET ORAL at 05:04

## 2025-04-17 RX ADMIN — AMLODIPINE BESYLATE 10 MG: 10 TABLET ORAL at 08:04

## 2025-04-17 RX ADMIN — CALCIUM ACETATE 667 MG: 667 CAPSULE ORAL at 05:04

## 2025-04-17 RX ADMIN — ANASTROZOLE 1 MG: 1 TABLET, COATED ORAL at 08:04

## 2025-04-17 RX ADMIN — CALCIUM ACETATE 667 MG: 667 CAPSULE ORAL at 12:04

## 2025-04-17 RX ADMIN — HYDROCODONE BITARTRATE AND ACETAMINOPHEN 1 TABLET: 10; 325 TABLET ORAL at 10:04

## 2025-04-17 RX ADMIN — CARVEDILOL 12.5 MG: 12.5 TABLET, FILM COATED ORAL at 04:04

## 2025-04-17 RX ADMIN — CALCIUM ACETATE 667 MG: 667 CAPSULE ORAL at 07:04

## 2025-04-17 RX ADMIN — FUROSEMIDE 80 MG: 40 TABLET ORAL at 08:04

## 2025-04-17 RX ADMIN — DULOXETINE HYDROCHLORIDE 40 MG: 20 CAPSULE, DELAYED RELEASE ORAL at 08:04

## 2025-04-17 NOTE — SUBJECTIVE & OBJECTIVE
Past Medical History:   Diagnosis Date    Bacteremia due to Enterococcus 09/28/2021    Catheter-associated urinary tract infection 09/29/2021    Cervicogenic migraine     Chronic pain     CKD (chronic kidney disease) stage 4, GFR 15-29 ml/min     [facility]    CKD (chronic kidney disease) stage 4, GFR 15-29 ml/min     Diabetes mellitus     Long term use of Insulin, Diabetic Neuropathy    Dialysis patient 01/21/2022    Fibromyalgia     Hydronephrosis     Hyperlipidemia     Hypertension 12/12/2012    Hypothyroidism 12/12/2012    ARON (iron deficiency anemia)     Insomnia     Levoscoliosis     Malignant neoplasm of upper-outer quadrant of left breast in female, estrogen receptor positive     Metabolic acidosis     Mobility impaired     Nuclear sclerosis - Both Eyes 03/24/2014    VIJAYA (obstructive sleep apnea)     Osteopenia     Pulmonary nodule     Recurrent UTI     Renal manifestation of secondary diabetes mellitus     Secondary hyperparathyroidism, renal     Urinary retention     Urinary tract infection due to ESBL Klebsiella        Past Surgical History:   Procedure Laterality Date    ANGIOGRAPHY OF UPPER EXTREMITY N/A 9/19/2024    Procedure: Angiogram Extremity Bilateral;  Surgeon: RODRIGO Friedman III, MD;  Location: [facility] OR 2ND FLR;  Service: Vascular;  Laterality: N/A;  R femoral approach, arch & arm angiogram  168.76mgy  33.1145 gycm2  8.9min    AV FISTULA PLACEMENT Left 2/14/2024    Procedure: CREATION, AV FISTULA;  Surgeon: RODRIGO Friedman III, MD;  Location: [facility] OR 2ND FLR;  Service: Vascular;  Laterality: Left;  LUE AVF creation vs AVG insertion    BIOPSY OF URETER Left 2/18/2022    Procedure: BIOPSY, URETER;  Surgeon: Ronel Whittington MD;  Location: [facility] OR 1ST FLR;  Service: Urology;  Laterality: Left;    BREAST BIOPSY Right     benign    CHOLECYSTECTOMY      COLONOSCOPY N/A 1/13/2017    Procedure: COLONOSCOPY;  Surgeon: Morris Wiseman MD;  Location: [facility] ENDO (4TH FLR);  Service: Endoscopy;   Laterality: N/A;  Renal pt Nephrology advised to avoid phosphate preps    COLONOSCOPY N/A 12/7/2023    Procedure: COLONOSCOPY;  Surgeon: Herve Allen MD;  Location: [facility] ENDO (2ND FLR);  Service: Endoscopy;  Laterality: N/A;  HD MWF; labs prior  suprapubic catheter  pt does not ambulate-uses christina lift- will have sling with her  9/7 ref. by Anastasiia Campbell, , PEG, instr. mailed, Eliquis hold approval pending-[facility] to hold Eliquis 2 days per Dr Navarro-GT  1/30-precall complete-MS    CYSTOSCOPY N/A 10/8/2018    Procedure: CYSTOSCOPY;  Surgeon: Ronel Whittington MD;  Location: [facility] OR 1ST FLR;  Service: Urology;  Laterality: N/A;  45 min    CYSTOSCOPY N/A 3/25/2019    Procedure: CYSTOSCOPY;  Surgeon: Ronel Whittington MD;  Location: [facility] OR 1ST FLR;  Service: Urology;  Laterality: N/A;  45 min    CYSTOSCOPY N/A 8/26/2019    Procedure: CYSTOSCOPY;  Surgeon: Ronel Whittington MD;  [facility]  Service: Urology;  Laterality: N/A;  45 min    CYSTOSCOPY N/A 7/2/2021    Procedure: CYSTOSCOPY;  Surgeon: Roenl Whittington MD;  [facility];  Service: Urology;  Laterality: N/A;    CYSTOSCOPY  12/23/2021    Procedure: CYSTOSCOPY;  Surgeon: Ronel Whittington MD;  [facility];  Service: Urology;;    CYSTOSCOPY  2/18/2022    Procedure: CYSTOSCOPY;  Surgeon: Ronel Whittington MD;  [facility];  Service: Urology;;    CYSTOSCOPY W/ URETERAL STENT PLACEMENT Left 5/15/2021    Procedure: CYSTOSCOPY, WITH URETERAL STENT INSERTION;  Surgeon: Levy Sánchez Jr., MD;  Location: [facility] OR 1ST FLR;  Service: Urology;  Laterality: Left;    CYSTOSCOPY WITH BIOPSY OF BLADDER N/A 1/27/2020    Procedure: CYSTOSCOPY, WITH BLADDER BIOPSY, WITH FULGURATION IF INDICATED;  Surgeon: Ronel Whittington MD;  Location: [facility] OR [address] [place] [address];  Service: Urology;  Laterality: N/A;    DILATION AND CURETTAGE OF UTERUS      FISTULOGRAM N/A 4/29/2024    Procedure:  Fistulogram;  Surgeon: RODRIGO Friedman III, MD;  Location: [facility] CATH LAB;  Service: Peripheral Vascular;  Laterality: N/A;    FISTULOGRAM Left 1/6/2025    Procedure: Fistulogram;  Surgeon: RODRIGO Friedman III, MD;  Location: [facility] CATH LAB;  Service: Peripheral Vascular;  Laterality: Left;    GALLBLADDER SURGERY  2006    HYSTERECTOMY      INJECTION FOR SENTINEL NODE IDENTIFICATION Left 8/1/2019    Procedure: INJECTION, FOR SENTINEL NODE IDENTIFICATION;  Surgeon: Samia Fulton MD;  Location: [facility] OR [address] [place] [address];  Service: General;  Laterality: Left;    INJECTION OF BOTULINUM TOXIN TYPE A N/A 10/8/2018    Procedure: INJECTION, BOTULINUM TOXIN, TYPE A 300 UNITS;  Surgeon: Ronel Whittington MD;  Location: [address];  Service: Urology;  Laterality: N/A;    INJECTION OF BOTULINUM TOXIN TYPE A N/A 3/25/2019    Procedure: INJECTION, BOTULINUM TOXIN, TYPE A 300 UNITS;  Surgeon: Ronel Whittington MD;  Location: [address];  Service: Urology;  Laterality: N/A;    INJECTION OF BOTULINUM TOXIN TYPE A N/A 8/26/2019    Procedure: INJECTION, BOTULINUM TOXIN, TYPE A 300 UNITS;  Surgeon: Ronel Whittington MD;  Location: [address];  Service: Urology;  Laterality: N/A;    MASTECTOMY Left 8/1/2019    Procedure: MASTECTOMY 23 hour stay;  Surgeon: Samia Fulton MD;  Location: [facility] OR [address] [place] [address];  Service: General;  Laterality: Left;    MEDIPORT REMOVAL Right 6/24/2022    Procedure: REMOVAL, CATHETER, CENTRAL VENOUS, TUNNELED, WITH PORT;  Surgeon: Isauro Anderson MD;  Location: [facility] CATH LAB;  Service: Radiology;  Laterality: Right;    OVARIAN CYST SURGERY  1985    PERCUTANEOUS TRANSLUMINAL ANGIOPLASTY OF ARTERIOVENOUS FISTULA Left 4/29/2024    Procedure: PTA, AV FISTULA;  Surgeon: RODRIGO Friedman III, MD;  Location: [facility] CATH LAB;  Service: Peripheral Vascular;  Laterality: Left;    REPLACEMENT OF STENT Left 12/23/2021    Procedure: REPLACEMENT, STENT;  Surgeon: Ronel Whittington MD;  Location: [facility]  "OR 1ST FLR;  Service: Urology;  Laterality: Left;    REPLACEMENT OF STENT Left 2/18/2022    Procedure: REPLACEMENT, STENT;  Surgeon: Ronel Whittington MD;  Location: [facility] OR 1ST FLR;  Service: Urology;  Laterality: Left;    RETROGRADE PYELOGRAPHY Left 2/18/2022    Procedure: PYELOGRAM, RETROGRADE;  Surgeon: Ronel Whittington MD;  Location: [facility] OR 1ST FLR;  Service: Urology;  Laterality: Left;    SENTINEL LYMPH NODE BIOPSY Left 8/1/2019    Procedure: BIOPSY, LYMPH NODE, SENTINEL;  Surgeon: Samia Fulton MD;  Location: [facility] OR 2ND FLR;  Service: General;  Laterality: Left;    spt placement      TONSILLECTOMY, ADENOIDECTOMY      TOTAL ABDOMINAL HYSTERECTOMY W/ BILATERAL SALPINGOOPHORECTOMY  1985    URETEROSCOPY Left 2/18/2022    Procedure: URETEROSCOPY;  Surgeon: Ronel Whittington MD;  [facility];  Service: Urology;  Laterality: Left;       Review of patient's allergies indicates:  No Known Allergies    Medications:  (Not in a hospital admission)    Antibiotics (From admission, onward)      Start     Stop Route Frequency Ordered    04/16/25 2241  vancomycin - pharmacy to dose  (vancomycin IVPB (PEDS and ADULTS))        Placed in "And" Linked Group    -- IV pharmacy to manage frequency 04/16/25 2142          Antifungals (From admission, onward)      None          Antivirals (From admission, onward)      None             Immunization History   Administered Date(s) Administered    COVID-19 Vaccine 02/25/2021, 03/25/2021    COVID-19, MRNA, LN-S, PF (MODERNA FULL 0.5 ML DOSE) 02/25/2021, 03/25/2021    COVID-19, MRNA, LN-S, PF (Pfizer) (Purple Cap) 12/24/2021    Hepatitis B 11/03/2023, 12/08/2023    Hepatitis B, Adult 11/03/2023, 12/08/2023, 07/19/2024, 08/16/2024, 09/20/2024, 12/20/2024    Influenza (FLUAD) - Quadrivalent - Adjuvanted - PF *Preferred* (65+) 01/30/2023    Influenza - Quadrivalent 10/01/2014    Influenza - Quadrivalent - PF *Preferred* (6 months and older) 09/27/2023    " Influenza - Trivalent - Fluarix, Flulaval, Fluzone, Afluria - PF 09/27/2024    Influenza - Trivalent - Fluzone High Dose - PF (65 years and older) 09/27/2017, 10/31/2018, 09/25/2019    PPD Test 07/01/2021, 12/21/2021, 01/03/2022, 01/25/2022    Pneumococcal Conjugate - 13 Valent 09/27/2017    Pneumococcal Polysaccharide - 23 Valent 09/19/2016, 01/30/2023    Zoster 12/16/2013       Family History       Problem Relation (Age of Onset)    ALS Mother    Cancer Maternal Grandmother, Paternal Grandfather, Brother    Diabetes Sister, Maternal Aunt, Maternal Uncle    Kidney disease Sister, Maternal Aunt    Prostate cancer Brother    Scoliosis Brother          Social History     Socioeconomic History    Marital status:     Number of children: 0    Highest education level: Master's degree (e.g., MA, MS, Lelo, MEd, MSW, BANDAR)   Occupational History    Occupation: teacher     Comment: retired   Tobacco Use    Smoking status: Never    Smokeless tobacco: Never   Substance and Sexual Activity    Alcohol use: No     Alcohol/week: 0.0 standard drinks of alcohol    Drug use: No    Sexual activity: Not Currently     Birth control/protection: Abstinence   Social History Narrative    Single ()             Brother passed away last year.  Pt lives her her sister. (5/27)     Social Drivers of Health     Financial Resource Strain: Low Risk  (1/19/2025)    Overall Financial Resource Strain (CARDIA)     Difficulty of Paying Living Expenses: Not very hard   Food Insecurity: No Food Insecurity (1/19/2025)    Hunger Vital Sign     Worried About Running Out of Food in the Last Year: Never true     Ran Out of Food in the Last Year: Never true   Transportation Needs: No Transportation Needs (12/31/2024)    TRANSPORTATION NEEDS     Transportation : No   Physical Activity: Inactive (1/19/2025)    Exercise Vital Sign     Days of Exercise per Week: 0 days     Minutes of Exercise per Session: 0 min   Stress: No Stress Concern Present  (1/19/2025)    [employer] [place] [employer] - Occupational Stress Questionnaire     Feeling of Stress : Only a little   Housing Stability: Unknown (1/19/2025)    Housing Stability Vital Sign     Unable to Pay for Housing in the Last Year: No     Homeless in the Last Year: No     Review of Systems   Constitutional:  Positive for fatigue. Negative for chills and fever.   Respiratory:  Negative for cough and shortness of breath.    Cardiovascular:  Negative for chest pain.   Gastrointestinal:  Negative for abdominal pain, constipation, diarrhea, nausea and vomiting.   Genitourinary:  Negative for dysuria and hematuria.   Musculoskeletal:  Negative for arthralgias and myalgias.   Skin:  Negative for rash.   Neurological:  Negative for weakness and headaches.     Objective:     Vital Signs (Most Recent):  Temp: 98.5 °F (36.9 °C) (04/17/25 0957)  Pulse: 75 (04/17/25 1100)  Resp: 20 (04/17/25 1009)  BP: (!) 166/77 (04/17/25 1000)  SpO2: 99 % (04/17/25 1100) Vital Signs (24h Range):  Temp:  [97.5 °F (36.4 °C)-98.6 °F (37 °C)] 98.5 °F (36.9 °C)  Pulse:  [60-94] 75  Resp:  [11-20] 20  SpO2:  [95 %-100 %] 99 %  BP: (128-194)/(62-99) 166/77     Weight: 96.6 kg (213 lb)  Body mass index is 32.39 kg/m².    Estimated Creatinine Clearance: 29.4 mL/min (A) (based on SCr of 2.1 mg/dL (H)).     Physical Exam  Vitals reviewed.   Constitutional:       General: She is not in acute distress.     Appearance: Normal appearance. She is not ill-appearing.   HENT:      Head: Normocephalic and atraumatic.   Eyes:      Extraocular Movements: Extraocular movements intact.      Conjunctiva/sclera: Conjunctivae normal.   Cardiovascular:      Rate and Rhythm: Normal rate and regular rhythm.      Heart sounds: No murmur heard.  Pulmonary:      Effort: Pulmonary effort is normal. No respiratory distress.      Breath sounds: Normal breath sounds. No wheezing.   Abdominal:      General: Abdomen is flat. Bowel sounds are normal.       Palpations: Abdomen is soft.      Tenderness: There is no abdominal tenderness.      Comments: Suprapubic catheter in place   Musculoskeletal:      Cervical back: Normal range of motion.      Comments: SAMIR GARCIA   Skin:     General: Skin is warm and dry.   Neurological:      General: No focal deficit present.      Mental Status: She is alert and oriented to person, place, and time.   Psychiatric:         Mood and Affect: Mood normal.         Behavior: Behavior normal.         Thought Content: Thought content normal.          Significant Labs: All pertinent labs within the past 24 hours have been reviewed.  Recent Lab Results  (Last 5 results in the past 24 hours)        04/17/25  0814   04/17/25  0453   04/17/25  0402   04/17/25  0233   04/17/25  0231        Albumin   2.2             Anion Gap   8             PTT   32.5  Comment: Refer to local heparin nomogram for intensity/dose specific therapeutic range.             Baso #   0.03             Basophil %   0.4             BUN   23             Calcium   8.1             Chloride   101             CO2   22             Creatinine   2.1             eGFR   25  Comment: Estimated GFR calculated using the CKD-EPI creatinine (2021) equation.             Eos #   0.25             Eos %   3.5             Gentamicin, Peak       5.9         Glucose   139             GRAM STAIN       Rare WBC seen                Many Gram Negative Rods                Moderate Budding yeast         Gran # (ANC)   4.81             Group & Rh   A POS             Hematocrit   37.3             Hemoglobin   11.4             Immature Grans (Abs)   0.08  Comment: Mild elevation in immature granulocytes is non specific and can be seen in a variety of conditions including stress response, acute inflammation, trauma and pregnancy. Correlation with other laboratory and clinical findings is essential.             Immature Granulocytes   1.1             INDIRECT ALEXEY   NEG             INR   1.0  Comment:  Coumadin Therapy:    2.0 - 3.0 for INR for all indicators except mechanical heart valves    and antiphospholipid syndromes which should use 2.5 - 3.5.             Lymph #   1.08             Lymph %   15.0             Magnesium    1.7             MCH   29.0             MCHC   30.6             MCV   95             Mono #   0.93             Mono %   13.0             MPV   8.9             Neut %   67.0             nRBC   0             Phosphorus Level   2.9             Platelet Count   254             POCT Glucose 118     156     192       Potassium   3.6             PT   11.2             RBC   3.93             RDW   14.0             Sodium   131             Specimen Outdate   04/20/2025 23:59             WBC   7.18                                    Significant Imaging: I have reviewed all pertinent imaging results/findings within the past 24 hours.

## 2025-04-17 NOTE — HPI
"Ms. Bowen is a 74F with PMH of DM2, HTN, ESRD on HD MWF via LUE AVF, neurogenic bladder with suprapubic catheter in place, afib, presents with hematuria. Infectious disease consulted for "hx of MDR ESBL, VSE recurrent UTI - chronic suprapubic catheter. assist with empiric antibiotic therapy".     Patient has history of ESBL Kleb pneumo and Proteus UTIs, most recently in Jan 2025, treated with gentamicin x1. Was also bacteremic with E faecalis at the time, and was treated with vancomycin. This admission, has received 1 dose of gentamicin in the ER. Currently on vancomycin. Has been afebrile, on room air, no leukocytosis. Suprapubic catheter exchanged in the ED. UCX sent post-exchange, gram stain with GNR and budding yeast.       "

## 2025-04-17 NOTE — CONSULTS
[facility] Emergency Dept  Nephrology  Consult Note    Patient Name: Cecile Bowen  MRN: [id number]  Admission Date: 4/16/2025  Hospital Length of Stay: 1 days  Attending Provider: Fariha Abraham MD   Primary Care Physician: Lurdes Navarro MD  Principal Problem:Acute cystitis with hematuria    Inpatient consult to Nephrology  Consult performed by: Lamar Negro DNP  Consult ordered by: Hernesto White MD  Reason for consult: ESRD        Subjective:     HPI: 75 y/o WF w/ ESRD on HD (m/f LUE AVF) Neurogenic bladder w/ suprapubic catheter, HTN, Type 2 DM, Afib presents to the ED for hematuria.  Patient noted blood in urine last night and during HD session today - HD staff noted pt with hematuria, patient referred to ED.  Report that on arrival pt with appearance of gross hematuria, SP catheter per patient due for exchange, patient has exchanged monthly.   She also reports noting some blood at site of cystostomy, that is now not apparent since catheter change. She is referred for admission for concern of acute cystitis w/ hematuria. She denies any fevers/chills, patient with chronic debility, requires assitance with modbility & ADLs, she lives with her sister, uses wheelchair for mobility.  She cannot transfer independently requires assistance.  She is on Apixaban 5mg po daily. Last HD prior to presentation was yesterday 4/16. Electrolytes stable. Nephrology consulted for ESRD.     HPI obtained via EMR and patient interview     Past Medical History:   Diagnosis Date    Bacteremia due to Enterococcus 09/28/2021    Catheter-associated urinary tract infection 09/29/2021    Cervicogenic migraine     Chronic pain     CKD (chronic kidney disease) stage 4, GFR 15-29 ml/min     [facility]    CKD (chronic kidney disease) stage 4, GFR 15-29 ml/min     Diabetes mellitus     Long term use of Insulin, Diabetic Neuropathy    Dialysis patient 01/21/2022    Fibromyalgia     Hydronephrosis     Hyperlipidemia      Hypertension 12/12/2012    Hypothyroidism 12/12/2012    ARON (iron deficiency anemia)     Insomnia     Levoscoliosis     Malignant neoplasm of upper-outer quadrant of left breast in female, estrogen receptor positive     Metabolic acidosis     Mobility impaired     Nuclear sclerosis - Both Eyes 03/24/2014    VIJAYA (obstructive sleep apnea)     Osteopenia     Pulmonary nodule     Recurrent UTI     Renal manifestation of secondary diabetes mellitus     Secondary hyperparathyroidism, renal     Urinary retention     Urinary tract infection due to ESBL Klebsiella        Past Surgical History:   Procedure Laterality Date    ANGIOGRAPHY OF UPPER EXTREMITY N/A 9/19/2024    Procedure: Angiogram Extremity Bilateral;  Surgeon: RODRIGO Friedman III, MD;  Location: [facility] OR 2ND FLR;  Service: Vascular;  Laterality: N/A;  R femoral approach, arch & arm angiogram  168.76mgy  33.1145 gycm2  8.9min    AV FISTULA PLACEMENT Left 2/14/2024    Procedure: CREATION, AV FISTULA;  Surgeon: RODRIGO Friedman III, MD;  Location: [facility] OR 2ND FLR;  Service: Vascular;  Laterality: Left;  LUE AVF creation vs AVG insertion    BIOPSY OF URETER Left 2/18/2022    Procedure: BIOPSY, URETER;  Surgeon: Ronel Whititngton MD;  Location: [facility] OR 1ST FLR;  Service: Urology;  Laterality: Left;    BREAST BIOPSY Right     benign    CHOLECYSTECTOMY      COLONOSCOPY N/A 1/13/2017    Procedure: COLONOSCOPY;  Surgeon: Morris Wiseman MD;  Location: [facility] ENDO (4TH FLR);  Service: Endoscopy;  Laterality: N/A;  Renal pt Nephrology advised to avoid phosphate preps    COLONOSCOPY N/A 12/7/2023    Procedure: COLONOSCOPY;  Surgeon: Herve Allen MD;  Location: [facility] ENDO (2ND FLR);  Service: Endoscopy;  Laterality: N/A;  HD MWF; labs prior  suprapubic catheter  pt does not ambulate-uses christina lift- will have sling with her  9/7 ref. by Anastasiia Campbell, , PEG, instr. mailed, Eliquis hold approval pending-st packer to hold Eliquis 2 days per   [id number]  1/30-precall complete-MS    CYSTOSCOPY N/A 10/8/2018    Procedure: CYSTOSCOPY;  Surgeon: Ronel Whittington MD;  Location: [facility] OR 1ST FLR;  Service: Urology;  Laterality: N/A;  45 min    CYSTOSCOPY N/A 3/25/2019    Procedure: CYSTOSCOPY;  Surgeon: Ronel Whittington MD;  Location: [facility] OR 1ST FLR;  Service: Urology;  Laterality: N/A;  45 min    CYSTOSCOPY N/A 8/26/2019    Procedure: CYSTOSCOPY;  Surgeon: Ronel Whittington MD;  Location: [facility] OR 1ST FLR;  Service: Urology;  Laterality: N/A;  45 min    CYSTOSCOPY N/A 7/2/2021    Procedure: CYSTOSCOPY;  Surgeon: Ronel Whittington MD;  Location: [facility] OR [facility] FLR;  Service: Urology;  Laterality: N/A;    CYSTOSCOPY  12/23/2021    Procedure: CYSTOSCOPY;  Surgeon: Ronel Whittington MD;  [facility];  Service: Urology;;    CYSTOSCOPY  2/18/2022    Procedure: CYSTOSCOPY;  Surgeon: Ronel Whittington MD;  [facility];  Service: Urology;;    CYSTOSCOPY W/ URETERAL STENT PLACEMENT Left 5/15/2021    Procedure: CYSTOSCOPY, WITH URETERAL STENT INSERTION;  Surgeon: Levy Sánchez Jr., MD;  [facility];  Service: Urology;  Laterality: Left;    CYSTOSCOPY WITH BIOPSY OF BLADDER N/A 1/27/2020    Procedure: CYSTOSCOPY, WITH BLADDER BIOPSY, WITH FULGURATION IF INDICATED;  Surgeon: Ronel Whittington MD;  Location: [facility] OR 1ST FLR;  Service: Urology;  Laterality: N/A;    DILATION AND CURETTAGE OF UTERUS      FISTULOGRAM N/A 4/29/2024    Procedure: Fistulogram;  Surgeon: RODRIGO Friedman III, MD;  Location: [facility] CATH LAB;  Service: Peripheral Vascular;  Laterality: N/A;    FISTULOGRAM Left 1/6/2025    Procedure: Fistulogram;  Surgeon: RODRIGO Friedman III, MD;  Location: [facility] CATH LAB;  Service: Peripheral Vascular;  Laterality: Left;    GALLBLADDER SURGERY  2006    HYSTERECTOMY      INJECTION FOR SENTINEL NODE IDENTIFICATION Left 8/1/2019    Procedure: INJECTION, FOR SENTINEL NODE  IDENTIFICATION;  Surgeon: Samia Fulton MD;  Location: [facility] OR 2ND FLR;  Service: General;  Laterality: Left;    INJECTION OF BOTULINUM TOXIN TYPE A N/A 10/8/2018    Procedure: INJECTION, BOTULINUM TOXIN, TYPE A 300 UNITS;  Surgeon: Ronel Whittington MD;  Location: [facility] OR 1ST FLR;  Service: Urology;  Laterality: N/A;    INJECTION OF BOTULINUM TOXIN TYPE A N/A 3/25/2019    Procedure: INJECTION, BOTULINUM TOXIN, TYPE A 300 UNITS;  Surgeon: Ronel Whittington MD;  Location: [facility] OR [address] [place] [address];  Service: Urology;  Laterality: N/A;    INJECTION OF BOTULINUM TOXIN TYPE A N/A 8/26/2019    Procedure: INJECTION, BOTULINUM TOXIN, TYPE A 300 UNITS;  Surgeon: Ronel Whittington MD;  Location: [facility] OR [address] [place] [address];  Service: Urology;  Laterality: N/A;    MASTECTOMY Left 8/1/2019    Procedure: MASTECTOMY 23 hour stay;  Surgeon: Samia Fulton MD;  Location: [facility] OR 2ND FLR;  Service: General;  Laterality: Left;    MEDIPORT REMOVAL Right 6/24/2022    Procedure: REMOVAL, CATHETER, CENTRAL VENOUS, TUNNELED, WITH PORT;  Surgeon: Isauro Anderson MD;  Location: [facility] CATH LAB;  Service: Radiology;  Laterality: Right;    OVARIAN CYST SURGERY  1985    PERCUTANEOUS TRANSLUMINAL ANGIOPLASTY OF ARTERIOVENOUS FISTULA Left 4/29/2024    Procedure: PTA, AV FISTULA;  Surgeon: RODRIGO Friedman III, MD;  Location: [facility] CATH LAB;  Service: Peripheral Vascular;  Laterality: Left;    REPLACEMENT OF STENT Left 12/23/2021    Procedure: REPLACEMENT, STENT;  Surgeon: Ronel Whittington MD;  Location: [facility] OR [address] [place] [address];  Service: Urology;  Laterality: Left;    REPLACEMENT OF STENT Left 2/18/2022    Procedure: REPLACEMENT, STENT;  Surgeon: Ronel Whittington MD;  Location: [facility] OR [address] [place] [address];  Service: Urology;  Laterality: Left;    RETROGRADE PYELOGRAPHY Left 2/18/2022    Procedure: PYELOGRAM, RETROGRADE;  Surgeon: Ronel Whittington MD;  Location: [facility] OR [address] [place] [address];  Service: Urology;  Laterality: Left;    SENTINEL LYMPH NODE BIOPSY Left  8/1/2019    Procedure: BIOPSY, LYMPH NODE, SENTINEL;  Surgeon: Samia Fulton MD;  Location: [facility] OR 2ND FLR;  Service: General;  Laterality: Left;    spt placement      TONSILLECTOMY, ADENOIDECTOMY      TOTAL ABDOMINAL HYSTERECTOMY W/ BILATERAL SALPINGOOPHORECTOMY  1985    URETEROSCOPY Left 2/18/2022    Procedure: URETEROSCOPY;  Surgeon: Ronel Whittington MD;  Location: [facility] OR 1ST FLR;  Service: Urology;  Laterality: Left;       Review of patient's allergies indicates:  No Known Allergies  Current Facility-Administered Medications   Medication Frequency    acetaminophen tablet 650 mg Q4H PRN    aluminum-magnesium hydroxide-simethicone 200-200-20 mg/5 mL suspension 30 mL QID PRN    amLODIPine tablet 10 mg Daily    anastrozole tablet 1 mg Daily    apixaban tablet 5 mg Daily    calcium acetate(phosphat bind) capsule 667 mg TID WM    carvediloL tablet 12.5 mg BID WM    dextrose 50% injection 12.5 g PRN    dextrose 50% injection 25 g PRN    diclofenac sodium 1 % gel 2 g BID PRN    DULoxetine DR capsule 40 mg Daily    [START ON 4/22/2025] ergocalciferol capsule 50,000 Units Q7 Days    furosemide tablet 80 mg Daily    glucagon (human recombinant) injection 1 mg PRN    glucose chewable tablet 16 g PRN    glucose chewable tablet 24 g PRN    HYDROcodone-acetaminophen  mg per tablet 1 tablet Q6H PRN    insulin aspart U-100 pen 0-5 Units QID (AC + HS) PRN    insulin glargine U-100 (Lantus) pen 10 Units QHS    ipratropium 42 mcg (0.06 %) nasal spray 2 spray BID    lactulose 20 gram/30 mL solution Soln 20 g BID PRN    levothyroxine tablet 50 mcg Before breakfast    losartan tablet 100 mg Daily    magnesium oxide tablet 400 mg Daily    melatonin tablet 6 mg Nightly PRN    methocarbamoL tablet 1,500 mg TID    naloxone 0.4 mg/mL injection 0.02 mg PRN    ondansetron injection 4 mg Q8H PRN    oxybutynin 24 hr tablet 10 mg Daily    oxyCODONE immediate release tablet 5 mg Daily PRN    prochlorperazine injection Soln 5 mg  "Q6H PRN    senna-docusate 8.6-50 mg per tablet 1 tablet BID PRN    sodium chloride 0.9% flush 10 mL Q6H PRN    sumatriptan tablet 100 mg Q2H PRN    traZODone tablet 100 mg QHS    vancomycin - pharmacy to dose pharmacy to manage frequency     Current Outpatient Medications   Medication    amLODIPine (NORVASC) 10 MG tablet    anastrozole (ARIMIDEX) 1 mg Tab    carvediloL (COREG) 12.5 MG tablet    diclofenac sodium (VOLTAREN) 1 % Gel    DULoxetine (CYMBALTA) 20 MG capsule    ELIQUIS 5 mg Tab    ergocalciferol (ERGOCALCIFEROL) 50,000 unit Cap    furosemide (LASIX) 80 MG tablet    HYDROcodone-acetaminophen (NORCO)  mg per tablet    lactulose (CHRONULAC) 10 gram/15 mL solution    LANTUS SOLOSTAR U-100 INSULIN 100 unit/mL (3 mL) InPn pen    losartan (COZAAR) 100 MG tablet    magnesium oxide (MAG-OX) 400 mg (241.3 mg magnesium) tablet    methocarbamoL (ROBAXIN) 750 MG Tab    oxybutynin (DITROPAN-XL) 10 MG 24 hr tablet    oxyCODONE (ROXICODONE) 5 MG immediate release tablet    sucroferric oxyhydroxide (VELPHORO) 500 mg Chew    sumatriptan (IMITREX) 100 MG tablet    SYNTHROID 50 mcg tablet    traZODone (DESYREL) 100 MG tablet    BD INSULIN SYRINGE ULTRA-FINE 0.5 mL 31 gauge x 5/16" Syrg    erenumab-aooe (AIMOVIG AUTOINJECTOR) 140 mg/mL autoinjector    ipratropium (ATROVENT) 21 mcg (0.03 %) nasal spray    pen needle, diabetic (BD ULTRA-FINE SHORT PEN NEEDLE) 31 gauge x 5/16" Ndle    sodium bicarbonate 650 MG tablet     Facility-Administered Medications Ordered in Other Encounters   Medication Frequency    epoetin chloe injection 2,000 Units 1 time in Clinic/HOD    heparin (porcine) injection 2,000 Units Once    heparin (porcine) injection 2,000 Units Once    heparin (porcine) injection 4,000 Units 1 time in Clinic/HOD    iron sucrose injection 100 mg 1 time in Clinic/HOD     Family History       Problem Relation (Age of Onset)    ALS Mother    Cancer Maternal Grandmother, Paternal Grandfather, Brother    Diabetes Sister, " Maternal Aunt, Maternal Uncle    Kidney disease Sister, Maternal Aunt    Prostate cancer Brother    Scoliosis Brother          Tobacco Use    Smoking status: Never    Smokeless tobacco: Never   Substance and Sexual Activity    Alcohol use: No     Alcohol/week: 0.0 standard drinks of alcohol    Drug use: No    Sexual activity: Not Currently     Birth control/protection: Abstinence     Review of Systems   Constitutional:  Positive for fatigue. Negative for chills and fever.   Respiratory:  Negative for cough and shortness of breath.    Cardiovascular:  Negative for chest pain and leg swelling.   Gastrointestinal:  Negative for abdominal pain, diarrhea, nausea and vomiting.   Genitourinary:  Positive for hematuria.   Musculoskeletal:  Positive for back pain. Negative for arthralgias and myalgias.   Neurological:  Negative for weakness and headaches.   Psychiatric/Behavioral: Negative.     Objective:     Vital Signs (Most Recent):  Temp: 98.5 °F (36.9 °C) (04/17/25 0957)  Pulse: 75 (04/17/25 1100)  Resp: 20 (04/17/25 1009)  BP: (!) 166/77 (04/17/25 1000)  SpO2: 99 % (04/17/25 1100) Vital Signs (24h Range):  Temp:  [97.5 °F (36.4 °C)-98.6 °F (37 °C)] 98.5 °F (36.9 °C)  Pulse:  [60-94] 75  Resp:  [11-20] 20  SpO2:  [95 %-100 %] 99 %  BP: (128-194)/(62-99) 166/77     Weight: 96.6 kg (213 lb) (04/16/25 1659)  Body mass index is 32.39 kg/m².  Body surface area is 2.15 meters squared.    No intake/output data recorded.     Physical Exam  Vitals and nursing note reviewed.   Constitutional:       Appearance: She is well-developed.   Eyes:      Pupils: Pupils are equal, round, and reactive to light.   Cardiovascular:      Rate and Rhythm: Normal rate and regular rhythm.   Pulmonary:      Effort: Pulmonary effort is normal.      Breath sounds: No wheezing.      Comments: Slight wheezing to RLL.   Abdominal:      Palpations: Abdomen is soft.      Tenderness: There is no abdominal tenderness.   Musculoskeletal:         General: No  tenderness.      Right lower leg: Edema present.      Left lower leg: Edema present.      Comments: 2+ pitting edema to BLE.    Skin:     General: Skin is warm and dry.   Neurological:      Mental Status: She is alert and oriented to person, place, and time.   Psychiatric:         Behavior: Behavior normal.      Significant Labs:  CBC:   Recent Labs   Lab 04/17/25  0453   WBC 7.18   RBC 3.93*   HGB 11.4*   HCT 37.3      MCV 95   MCH 29.0   MCHC 30.6*     CMP:   Recent Labs   Lab 04/16/25  1841 04/17/25  0453   CALCIUM 8.3* 8.1*   ALBUMIN 2.5* 2.2*   * 131*   K 3.5 3.6   CO2 24 22*   CL 97 101   BUN 18 23   CREATININE 1.9* 2.1*   ALKPHOS 137  --    ALT 18  --    AST 23  --    BILITOT 0.3  --      All labs within the past 24 hours have been reviewed.      Assessment/Plan:     Renal/  * Acute cystitis with hematuria  - management per primary     ESRD (end stage renal disease) on dialysis  Nephrology History  iHD Schedule: MF  Unit/MD: [facility]/Dr. Hare   Duration: 4 hours   EDW: 98 kg.   Access: JENNY AVF   Residual Renal Function: present       Plan/Recommendations:     - No urgent indication for RRT. Next HD tomorrow 4/18  - Hgb goal 10-11, at goal  - Hyponatremia in setting of ESRD, 1L fluid restrictions   - continue sevelamer               Thank you for your consult. I will follow-up with patient. Please contact us if you have any additional questions.    Lamar Negro, MUNA  Nephrology  [facility] - Emergency Dept

## 2025-04-17 NOTE — ASSESSMENT & PLAN NOTE
Body mass index is 32.39 kg/m². Morbid obesity complicates all aspects of disease management from diagnostic modalities to treatment.

## 2025-04-17 NOTE — PLAN OF CARE
SW received notification from [facility] that patient requires stretcher transport home and ready for dc at this time. SW placed PFC orders for patient transport to Home. Patient notified and agreeable to dc plan.     No additional post-acute needs identified.                HARVEY Blanchard, LMSW  [facility]  Case Management  Ext. [phone]

## 2025-04-17 NOTE — PROGRESS NOTES
Therapy with vancomycin complete and/or consult discontinued by provider.  Pharmacy will sign off, please re-consult as needed.     Thanks,      Zion Casas, LibanD

## 2025-04-17 NOTE — PROGRESS NOTES
"Pharmacokinetic Initial Assessment: IV Vancomycin    Assessment/Plan:    Initiate intravenous vancomycin with loading dose of 2000 mg once with subsequent doses when random concentrations are less than 20 mcg/mL.  Desired empiric serum trough concentration is 10 to 20 mcg/mL.  Draw vancomycin random level with AM labs on 04/18/2025.  Pharmacy will continue to follow and monitor vancomycin.      Please contact pharmacy at extension [phone] with any questions regarding this assessment.     Thank you for the consult,   Silvino Churchill       Patient brief summary:  Cecile Bowen is a 74 y.o. female initiated on antimicrobial therapy with IV Vancomycin for treatment of suspected urinary tract infection.    Drug Allergies:   Review of patient's allergies indicates:  No Known Allergies    Actual Body Weight:   96.6 kg    Renal Function:   Estimated Creatinine Clearance: 32.5 mL/min (A) (based on SCr of 1.9 mg/dL (H)).    Dialysis Method (if applicable):  intermittent HD, home schedule MWF    CBC (last 72 hours):  Recent Labs   Lab Result Units 04/16/25  1841   WBC K/uL 6.77   HGB gm/dL 11.8*   HCT % 37.4   Platelet Count K/uL 261   Lymph % % 12.4*   Mono % % 14.3   Eos % % 4.3   Basophil % % 0.4       Metabolic Panel (last 72 hours):  Recent Labs   Lab Result Units 04/16/25  1841 04/16/25  2106   Sodium mmol/L 130*  --    Potassium mmol/L 3.5  --    Chloride mmol/L 97  --    CO2 mmol/L 24  --    Glucose mg/dL 243*  --    Glucose, UA   --  2+*   BUN mg/dL 18  --    Creatinine mg/dL 1.9*  --    Albumin g/dL 2.5*  --    Bilirubin Total mg/dL 0.3  --    ALP unit/L 137  --    AST unit/L 23  --    ALT unit/L 18  --        Drug levels (last 3 results):  No results for input(s): "VANCOMYCINRA", "VANCORANDOM", "VANCOMYCINPE", "VANCOPEAK", "VANCOMYCINTR", "VANCOTROUGH" in the last 72 hours.    Microbiologic Results:  Microbiology Results (last 7 days)       Procedure Component Value Units Date/Time    Urine culture [[id number]] " Collected: 04/16/25 2106    Order Status: Sent Specimen: Urine Updated: 04/16/25 2123

## 2025-04-17 NOTE — ASSESSMENT & PLAN NOTE
Nephrology History  iHD Schedule: MF  Unit/MD: [facility]/Dr. Hare   Duration: 4 hours   EDW: 98 kg.   Access: JENNY AVF   Residual Renal Function: present       Plan/Recommendations:     - No urgent indication for RRT. Next HD tomorrow 4/18  - Hgb goal 10-11, at goal  - Hyponatremia in setting of ESRD, 1L fluid restrictions   - continue sevelamer

## 2025-04-17 NOTE — ASSESSMENT & PLAN NOTE
-s/p doses of Garamycin/vancomycin in ED, will hold further garamcyin pending ID consultation   -s/p SP catheter change and ED reported prior to change pt with gross hematuria - Hgb is stable, check coags, if gross hematuria recurs - consult Urology for evaluation   -HOLD apixaban at this time   -f/u pending urine culture, add-on urine gram stain placed

## 2025-04-17 NOTE — PROGRESS NOTES
[facility] - Emergency Dept  [facility] Medicine  Progress Note    Patient Name: Cecile Bowen  MRN: [id number]  Patient Class: IP- Inpatient   Admission Date: 4/16/2025  Length of Stay: 1 days  Attending Physician: Fariha Abraham MD  Primary Care Provider: Lurdes Navarro MD        Subjective     Principal Problem:Acute cystitis with hematuria        HPI:  75 y/o WF w/ ESRD on HD (m/w/f LUE avf) Neurogenic bladder w/ suprapubic catheter, HTN, Type 2 DM, Afib presents to the ED for hematuria.  Patient noted blood in urine last night and during HD session today - HD staff noted pt with hematuria, patient referred to ED.  Report that on arrival pt with appearance of gross hematuria, SP catheter per patient due for exchange, patient has exchanged monthly.   She also reports noting some blood at site of cystostomy, that is now not apparent since catheter change.   Urine tonight now appears yellow and Urine microscopy with >100 RBC and >100WBC.  She is referred for admission for concern of acute cystitis w/ hematuria.   Patient reported that when hematuria develops it is often sign of developing infection.     She denies any fevers/chills, patient with chronic debility, requires assitance with modbility & ADLs, she lives with her sister, uses wheelchair for mobility.  She cannot transfer independently requires assistance.  She is on Apixaban 5mg po daily.     Her last admit to [facility] 12/2024-01/2025 for severe sepsis with enterococcus bacteremia, treated with daptomycin, had MDR ESBL Klebsiella in urien <100k CFU had one dose garamycin.  Tonight patient received Vancomycin and Garamycin x 1 and referred for admission.       Overview/Hospital Course:  Ms. Bowen was admitted for acute cystitis. CBC unremarkable. CMP consistent with ESRD. UA infectious, cultures pending. FC exchanged 4/16. ID consulted, appreciate recs. Will continue vanc.     Interval History: NAEON. HDS. Patient reports feeling well this morning.  Notes hematuria resolved after FC exchange last night. Denies any suprapubic pain or dysuria. No fever or chills. Notes chronic BLE edema, she is on lasix. Urine cultures pending. ID consulted, jacque main. Will continue vanc.     Review of Systems   Constitutional:  Negative for chills and fever.   Respiratory:  Negative for chest tightness and shortness of breath.    Cardiovascular:  Negative for chest pain and leg swelling.   Gastrointestinal:  Negative for abdominal pain and nausea.   Musculoskeletal:  Positive for back pain.   Neurological:  Negative for dizziness and weakness.     Objective:     Vital Signs (Most Recent):  Temp: 98.6 °F (37 °C) (04/17/25 0724)  Pulse: 78 (04/17/25 0900)  Resp: 20 (04/17/25 0724)  BP: (!) 192/67 (04/17/25 0900)  SpO2: 99 % (04/17/25 0900) Vital Signs (24h Range):  Temp:  [97.5 °F (36.4 °C)-98.6 °F (37 °C)] 98.6 °F (37 °C)  Pulse:  [60-94] 78  Resp:  [11-20] 20  SpO2:  [95 %-100 %] 99 %  BP: (125-194)/(62-99) 192/67     Weight: 96.6 kg (213 lb)  Body mass index is 32.39 kg/m².  No intake or output data in the 24 hours ending 04/17/25 0938      Physical Exam  Vitals and nursing note reviewed.   Constitutional:       Appearance: She is well-developed.   Eyes:      Pupils: Pupils are equal, round, and reactive to light.   Cardiovascular:      Rate and Rhythm: Normal rate and regular rhythm.   Pulmonary:      Effort: Pulmonary effort is normal.      Breath sounds: Wheezing present.      Comments: Slight wheezing to RLL.   Abdominal:      Palpations: Abdomen is soft.      Tenderness: There is no abdominal tenderness.   Musculoskeletal:         General: No tenderness.      Right lower leg: Edema present.      Left lower leg: Edema present.      Comments: 2+ pitting edema to BLE.    Skin:     General: Skin is warm and dry.   Neurological:      Mental Status: She is alert and oriented to person, place, and time.   Psychiatric:         Behavior: Behavior normal.                Significant Labs: All pertinent labs within the past 24 hours have been reviewed.  CBC:   Recent Labs   Lab 04/16/25  1841 04/17/25  0453   WBC 6.77 7.18   HGB 11.8* 11.4*   HCT 37.4 37.3    254     CMP:   Recent Labs   Lab 04/16/25  1841 04/17/25  0453   * 131*   K 3.5 3.6   CL 97 101   CO2 24 22*   BUN 18 23   CREATININE 1.9* 2.1*   CALCIUM 8.3* 8.1*   ALBUMIN 2.5* 2.2*   BILITOT 0.3  --    ALKPHOS 137  --    AST 23  --    ALT 18  --    ANIONGAP 9 8       Significant Imaging: I have reviewed all pertinent imaging results/findings within the past 24 hours.      Assessment & Plan  Acute cystitis with hematuria  - S/p doses of Garamycin/vancomycin in ED, will hold further garamcyin pending ID consultation   - S/p SP catheter change and ED reported prior to change pt with gross hematuria - Hgb is stable, check coags, if gross hematuria recurs - consult Urology for evaluation   - HOLD apixaban at this time   - FC exchanged 4/16  - UA infectious, cultures pending  - ID consulted, appreciate recs  - Continue vanc for now.    Neurogenic bladder  Chronic suprapubic catheter   - S/p SP catheter exchange 4/16  - Routine catheter care  - Continue home oxybutynin    Chronic suprapubic catheter      Type 2 diabetes mellitus treated with insulin  Patient's FSGs are controlled on current medication regimen.  Last A1c reviewed-   Lab Results   Component Value Date    HGBA1C 5.4 04/16/2025     Most recent fingerstick glucose reviewed-   Recent Labs   Lab 04/17/25  0231 04/17/25  0402 04/17/25  0814   POCTGLUCOSE 192* 156* 118*     Current correctional scale  Low  Maintain anti-hyperglycemic dose as follows-   Antihyperglycemics (From admission, onward)      Start     Stop Route Frequency Ordered    04/17/25 0300  insulin glargine U-100 (Lantus) pen 10 Units         -- SubQ Nightly 04/17/25 0259    04/17/25 0111  insulin aspart U-100 pen 0-5 Units         -- SubQ Before meals & nightly PRN 04/17/25 0012           Hold Oral hypoglycemics while patient is in the hospital.      Hyponatremia  Hyponatremia is likely due to renal insufficiency. The patient's most recent sodium results are listed below.  Recent Labs     04/16/25 1841 04/17/25  0453   * 131*     Plan  -   - Will treat the hyponatremia with Fluid restriction of:  1.5 liter per day  - Monitor sodium Daily.   - Patient hyponatremia is stable    ESRD (end stage renal disease) on dialysis  - HD on MWF. Due tomorrow.   - Nephrology consulted, appreciate recs  - Patient reports her phos binder is VELPHORO now - exact dose unknown, use [facility] formulary substitute TID with meals.   Anemia in ESRD (end-stage renal disease)  Anemia is likely due to chronic disease due to Chronic Kidney Disease. Most recent hemoglobin and hematocrit are listed below.  Recent Labs     04/16/25 1841 04/17/25 0453   HGB 11.8* 11.4*   HCT 37.4 37.3     Plan  - Monitor serial CBC: Daily  - Report of gross hematuria - that may be resolving already.  - Hold eliquis   - Trend CBC  Unspecified atrial fibrillation  Patient has paroxysmal (<7 days) atrial fibrillation. Patient is currently in sinus rhythm. [id number] Score: 3. The patients heart rate in the last 24 hours is as follows:  Pulse  Min: 60  Max: 94     Antiarrhythmics       Anticoagulants  apixaban tablet 5 mg, Daily, Oral    Plan  - Replete lytes with a goal of K>4, Mg >2  - Patient is not anticoagulated due to report of Hematuria  - Patient's afib is currently controlled        Primary hypertension  Patient's blood pressure range in the last 24 hours was: BP  Min: 125/67  Max: 194/71.The patient's inpatient anti-hypertensive regimen is listed below:  Current Antihypertensives  amLODIPine tablet 10 mg, Daily, Oral  carvediloL tablet 12.5 mg, 2 times daily with meals, Oral  furosemide tablet 80 mg, Daily, Oral  losartan tablet 100 mg, Daily, Oral    Plan  - BP is uncontrolled, will adjust as follows: resume home  medications    Hypothyroidism  - Resume home levothyroxine 50mcg ac breakfast     VIJAYA (obstructive sleep apnea)  - She reports not using CPAP/BIPAP as an outpatient.     Class 1 obesity due to excess calories with serious comorbidity in adult  Body mass index is 32.39 kg/m². Morbid obesity complicates all aspects of disease management from diagnostic modalities to treatment.       Malignant neoplasm of left breast, estrogen receptor positive  Follows with oncology  Continue anastrazole    Pressure injury of right buttock, stage 3  - Q2 turns  - Order low air loss mattress  - Elevate heels off bet  - Wound care consulted, appreciate recs    VTE Risk Mitigation (From admission, onward)           Ordered     apixaban tablet 5 mg  Daily         04/17/25 0259     Reason for No Pharmacological VTE Prophylaxis  Once        Question:  Reasons:  Answer:  Already adequately anticoagulated on oral Anticoagulants    04/17/25 0259     IP VTE HIGH RISK PATIENT  Once         04/17/25 0259     Place sequential compression device  Until discontinued         04/17/25 0259                    Discharge Planning   FLORENTINO:      Code Status: Full Code   Medical Readiness for Discharge Date:                  Preet Isaacs PA-C  Department of Hospital Medicine   [facility] - Emergency Dept

## 2025-04-17 NOTE — DISCHARGE INSTRUCTIONS
.Our goal at [facility] is to always give you outstanding care and exceptional service. You may receive a survey from [employer] by mail, text or e-mail in the next 24-48 hours asking about the care you received with us. The survey should only take 5-10 minutes to complete and is very important to us.     Your feedback provides us with a way to recognize our staff who work tirelessly to provide the best care! Also, your responses help us learn how to improve when your experience was below our aspiration of excellence. We are always looking for ways to improve your stay. We WILL use your feedback to continue making improvements to help us provide the highest quality care. We keep your personal information and feedback confidential. We appreciate your time completing this survey and can't wait to hear from you!!!    We look forward to your continued care with us! Thanks so much for choosing [facility] for your healthcare needs!

## 2025-04-17 NOTE — PLAN OF CARE
[facility] - Internal Medicine Telemetry  Initial Discharge Assessment       Primary Care Provider: Lurdes Navarro MD    Admission Diagnosis: Chest pain [R07.9]  Urinary tract infection associated with indwelling urethral catheter, initial encounter [T83.511A, N39.0]    Admission Date: 4/16/2025  Expected Discharge Date: 4/17/2025    Transition of Care Barriers: None    Payor: HUMANA MANAGED MEDICARE / Plan: HUMANA MEDICARE HMO / Product Type: Capitation /     Extended Emergency Contact Information  Primary Emergency Contact: Kat Sánchez  Address: [address] [place] [address] [place]  Mobile Phone: [phone]  Relation: Sister    Discharge Plan A: Home with family, Home Health  Discharge Plan B: Home with family      [id number] [place] - [address] AT [facility] [address]  [address] [place] [address]  Phone: [phone] Fax: [phone]    [id number] - [place] - [address]  [address] [address]  [place] [address]  Phone: [phone] Fax: [phone]      Initial Assessment (most recent)       Adult Discharge Assessment - 04/17/25 1520          Discharge Assessment    Assessment Type Discharge Planning Assessment     Confirmed/corrected address, phone number and insurance Yes     Confirmed Demographics Correct on Facesheet     Source of Information patient     Communicated FLORENTINO with patient/caregiver Yes     People in Home sibling(s)   Kat Sánchez (Sister)  [phone]    Do you expect to return to your current living situation? Yes     Do you have help at home or someone to help you manage your care at home? Yes     Who are your caregiver(s) and their phone number(s)? Kat Sánchez (Sister)  [phone]     Prior to hospitilization cognitive status: Alert/Oriented     Current cognitive status: Alert/Oriented     Walking or Climbing Stairs Difficulty yes     Walking or Climbing  Stairs ambulation difficulty, requires equipment     Mobility Management cane, walker, wc     Dressing/Bathing Difficulty yes     Dressing/Bathing Management sister assist as needed     Home Accessibility wheelchair accessible     Home Layout Able to live on 1st floor     Equipment Currently Used at Home wheelchair;walker, rolling;cane, straight;hospital bed     Readmission within 30 days? No     Patient currently being followed by outpatient case management? No     Do you take prescription medications? Yes     Do you have prescription coverage? Yes     Coverage HUMANA MANAGED MEDICARE - HUMANA MEDICARE HMO - CAPITATED     Do you have any problems affording any of your prescribed medications? No     Is the patient taking medications as prescribed? yes     How do you get to doctors appointments? public transportation;other (see comments)   Orders oop transportation through non-emergency medical transportation company    Are you on dialysis? Yes     Dialysis Name and Scheduled days OK MWF     Discharge Plan A Home with family;Home Health     Discharge Plan B Home with family     DME Needed Upon Discharge  none     Discharge Plan discussed with: Patient     Transition of Care Barriers None        Physical Activity    On average, how many days per week do you engage in moderate to strenuous exercise (like a brisk walk)? 0 days     On average, how many minutes do you engage in exercise at this level? 0 min        Financial Resource Strain    How hard is it for you to pay for the very basics like food, housing, medical care, and heating? Somewhat hard        Housing Stability    In the last 12 months, was there a time when you were not able to pay the mortgage or rent on time? No     At any time in the past 12 months, were you homeless or living in a shelter (including now)? No        Alcohol Use    Q1: How often do you have a drink containing alcohol? Never     Q2: How many drinks containing alcohol do you have on a  typical day when you are drinking? Patient does not drink     Q3: How often do you have six or more drinks on one occasion? Never        Health Literacy    How often do you need to have someone help you when you read instructions, pamphlets, or other written material from your doctor or pharmacy? Never        OTHER    Name(s) of People in Home Sister Kat                 Discharge Plan A and Plan B have been determined by review of patient's clinical status, future medical and therapeutic needs, and coverage/benefits for post-acute care in coordination with multidisciplinary team members.                           HARVEY Blanchard, LMSW  [facility]  Case Management  Ext. [phone]

## 2025-04-17 NOTE — PROGRESS NOTES
Pharmacokinetic Initial Assessment: Gentamicin    Assessment:  Weight utilized for dose calculation: Adjusted Body Weight  Dosing method utilized: Dosing by random level    Plan: Traditional dosing regimen: Gentamicin 154 mg (2 mg/kg) IV once, followed by a serum peak level scheduled 60 minutes after the end of the dose for calculation Vd (if necessary) to be drawn on 04/17/2025 at 0200.  Goal Peak level is 8-10 mg/L. Trough monitoring to follow 2 after completion of HD.    Pharmacy will continue to monitor.    Please contact pharmacy at extension [phone] with any questions regarding this assessment.    Thank you for the consult,    Silvino Churchill       Patient brief summary:  Cecile Bowen is a 74 y.o. female initiated on aminoglycoside therapy for treatment of suspected urinary tract infection.    Drug Allergies:   Review of patient's allergies indicates:  No Known Allergies    Actual Body Weight:   96.6 kg    Adjust Body Weight:   77 kg    Ideal Body Weight:  63.9 kg    Renal Function:   Estimated Creatinine Clearance: 32.5 mL/min (A) (based on SCr of 1.9 mg/dL (H)).    Dialysis Method (if applicable):  intermittent HD, home schedule MWF    CBC (last 72 hours):  Recent Labs   Lab Result Units 04/16/25  1841   WBC K/uL 6.77   HGB gm/dL 11.8*   HCT % 37.4   Platelet Count K/uL 261   Lymph % % 12.4*   Mono % % 14.3   Eos % % 4.3   Basophil % % 0.4       Metabolic Panel (last 72 hours):  Recent Labs   Lab Result Units 04/16/25  1841 04/16/25  2106   Sodium mmol/L 130*  --    Potassium mmol/L 3.5  --    Chloride mmol/L 97  --    CO2 mmol/L 24  --    Glucose mg/dL 243*  --    Glucose, UA   --  2+*   BUN mg/dL 18  --    Creatinine mg/dL 1.9*  --    Albumin g/dL 2.5*  --    Bilirubin Total mg/dL 0.3  --    ALP unit/L 137  --    AST unit/L 23  --    ALT unit/L 18  --        Microbiologic Results:  Microbiology Results (last 7 days)       Procedure Component Value Units Date/Time    Urine culture [[id number]]  Collected: 04/16/25 2106    Order Status: Sent Specimen: Urine Updated: 04/16/25 2123

## 2025-04-17 NOTE — ASSESSMENT & PLAN NOTE
- HD on MWF. Due tomorrow.   - Nephrology consulted, appreciate recs  - Patient reports her phos binder is VELPHORO now - exact dose unknown, use [facility] substitute TID with meals.

## 2025-04-17 NOTE — ASSESSMENT & PLAN NOTE
Hyponatremia is likely due to renal insufficiency. The patient's most recent sodium results are listed below.  Recent Labs     04/16/25  1841 04/17/25  0453   * 131*     Plan  -   - Will treat the hyponatremia with Fluid restriction of:  1.5 liter per day  - Monitor sodium Daily.   - Patient hyponatremia is stable

## 2025-04-17 NOTE — ASSESSMENT & PLAN NOTE
Chronic suprapubic catheter   - S/p SP catheter exchange 4/16  - Routine catheter care  - Continue home oxybutynin

## 2025-04-17 NOTE — ED NOTES
Pt identifiers Cecile A Jessi were checked and are correct  supra pubic catheter noted with yellow colored urine draining to  bag  Waffle mattress in place   LOC: The patient is awake, alert, aware of environment with an appropriate affect. Oriented x4, speaking appropriately  APPEARANCE: Pt resting comfortably, in no acute distress, pt is clean and well groomed, clothing properly fastened  SKIN: Skin warm, dry and intact, normal skin turgor, moist mucus membranes  Dry skin with skin break down noted to buttocks   RESPIRATORY: Airway is open and patent, respirations are spontaneous, even and unlabored, normal effort and rate  CARDIAC: Normal rate and rhythm, 1 +  peripheral edema noted, capillary refill < 3 seconds, bilateral radial pulses 2+  Pt is on a cardiac monitor and pulse oximetry  Left upper arm fistula noted with palpable thrill  ABDOMEN: Soft, nontender, nondistended. Bowel sounds present   NEUROLOGIC: PERRL, facial expression is symmetrical, patient moving all extremities spontaneously, normal sensation in all extremities when touched with a finger.  Follows all commands appropriately  MUSCULOSKELETAL: No obvious deformities.

## 2025-04-17 NOTE — HPI
75 y/o WF w/ ESRD on HD (m/w/f LUE avf) Neurogenic bladder w/ suprapubic catheter, HTN, Type 2 DM, Afib presents to the ED for hematuria.  Patient noted blood in urine last night and during HD session today - HD staff noted pt with hematuria, patient referred to ED.  Report that on arrival pt with appearance of gross hematuria, SP catheter per patient due for exchange, patient has exchanged monthly.   She also reports noting some blood at site of cystostomy, that is now not apparent since catheter change.   Urine tonight now appears yellow and Urine microscopy with >100 RBC and >100WBC.  She is referred for admission for concern of acute cystitis w/ hematuria.   Patient reported that when hematuria develops it is often sign of developing infection.     She denies any fevers/chills, patient with chronic debility, requires assitance with modbility & ADLs, she lives with her sister, uses wheelchair for mobility.  She cannot transfer independently requires assistance.  She is on Apixaban 5mg po daily.     Her last admit to [facility] 12/2024-01/2025 for severe sepsis with enterococcus bacteremia, treated with daptomycin, had MDR ESBL Klebsiella in urien <100k CFU had one dose garamycin.  Tonight patient received Vancomycin and Garamycin x 1 and referred for admission.

## 2025-04-17 NOTE — SUBJECTIVE & OBJECTIVE
Past Medical History:   Diagnosis Date    Bacteremia due to Enterococcus 09/28/2021    Catheter-associated urinary tract infection 09/29/2021    Cervicogenic migraine     Chronic pain     CKD (chronic kidney disease) stage 4, GFR 15-29 ml/min     [facility]    CKD (chronic kidney disease) stage 4, GFR 15-29 ml/min     Diabetes mellitus     Long term use of Insulin, Diabetic Neuropathy    Dialysis patient 01/21/2022    Fibromyalgia     Hydronephrosis     Hyperlipidemia     Hypertension 12/12/2012    Hypothyroidism 12/12/2012    ARON (iron deficiency anemia)     Insomnia     Levoscoliosis     Malignant neoplasm of upper-outer quadrant of left breast in female, estrogen receptor positive     Metabolic acidosis     Mobility impaired     Nuclear sclerosis - Both Eyes 03/24/2014    VIJAYA (obstructive sleep apnea)     Osteopenia     Pulmonary nodule     Recurrent UTI     Renal manifestation of secondary diabetes mellitus     Secondary hyperparathyroidism, renal     Urinary retention     Urinary tract infection due to ESBL Klebsiella        Past Surgical History:   Procedure Laterality Date    ANGIOGRAPHY OF UPPER EXTREMITY N/A 9/19/2024    Procedure: Angiogram Extremity Bilateral;  Surgeon: RODRIGO Friedman III, MD;  Location: [facility] OR 2ND FLR;  Service: Vascular;  Laterality: N/A;  R femoral approach, arch & arm angiogram  168.76mgy  33.1145 gycm2  8.9min    AV FISTULA PLACEMENT Left 2/14/2024    Procedure: CREATION, AV FISTULA;  Surgeon: RODRIGO Friedman III, MD;  Location: [facility] OR 2ND FLR;  Service: Vascular;  Laterality: Left;  LUE AVF creation vs AVG insertion    BIOPSY OF URETER Left 2/18/2022    Procedure: BIOPSY, URETER;  Surgeon: Ronel Whittington MD;  Location: [facility] OR 1ST FLR;  Service: Urology;  Laterality: Left;    BREAST BIOPSY Right     benign    CHOLECYSTECTOMY      COLONOSCOPY N/A 1/13/2017    Procedure: COLONOSCOPY;  Surgeon: Morris Wiseman MD;  Location: [facility] ENDO (4TH FLR);  Service: Endoscopy;   Laterality: N/A;  Renal pt Nephrology advised to avoid phosphate preps    COLONOSCOPY N/A 12/7/2023    Procedure: COLONOSCOPY;  Surgeon: Herve Allen MD;  Location: [facility] ENDO (2ND FLR);  Service: Endoscopy;  Laterality: N/A;  HD MWF; labs prior  suprapubic catheter  pt does not ambulate-uses christina lift- will have sling with her  9/7 ref. by Anastasiia Campbell, , PEG, instr. mailed, Eliquis hold approval pending-[facility] [place] to hold Eliquis 2 days per Dr Navarro-GT  1/30-precall complete-MS    CYSTOSCOPY N/A 10/8/2018    Procedure: CYSTOSCOPY;  Surgeon: Ronel Whittington MD;  Location: [facility] OR 1ST FLR;  Service: Urology;  Laterality: N/A;  45 min    CYSTOSCOPY N/A 3/25/2019    Procedure: CYSTOSCOPY;  Surgeon: Ronel Whittington MD;  Location: [facility] OR 1ST FLR;  Service: Urology;  Laterality: N/A;  45 min    CYSTOSCOPY N/A 8/26/2019    Procedure: CYSTOSCOPY;  Surgeon: Ronel Whittington MD;  [facility];  Service: Urology;  Laterality: N/A;  45 min    CYSTOSCOPY N/A 7/2/2021    Procedure: CYSTOSCOPY;  Surgeon: Ronel Whittington MD;  [facility];  Service: Urology;  Laterality: N/A;    CYSTOSCOPY  12/23/2021    Procedure: CYSTOSCOPY;  Surgeon: Ronel Whittington MD;  [facility];  Service: Urology;;    CYSTOSCOPY  2/18/2022    Procedure: CYSTOSCOPY;  Surgeon: Ronel Whittington MD;  [facility];  Service: Urology;;    CYSTOSCOPY W/ URETERAL STENT PLACEMENT Left 5/15/2021    Procedure: CYSTOSCOPY, WITH URETERAL STENT INSERTION;  Surgeon: Levy Sánchez Jr., MD;  Location: [facility] OR 1ST FLR;  Service: Urology;  Laterality: Left;    CYSTOSCOPY WITH BIOPSY OF BLADDER N/A 1/27/2020    Procedure: CYSTOSCOPY, WITH BLADDER BIOPSY, WITH FULGURATION IF INDICATED;  Surgeon: Ronel Whittington MD;  Location: [facility] OR [address] [place] [address];  Service: Urology;  Laterality: N/A;    DILATION AND CURETTAGE OF UTERUS      FISTULOGRAM N/A 4/29/2024    Procedure:  Fistulogram;  Surgeon: RODRIGO Friedman III, MD;  Location: [facility] CATH LAB;  Service: Peripheral Vascular;  Laterality: N/A;    FISTULOGRAM Left 1/6/2025    Procedure: Fistulogram;  Surgeon: RODRIGO Friedman III, MD;  Location: [facility] CATH LAB;  Service: Peripheral Vascular;  Laterality: Left;    GALLBLADDER SURGERY  2006    HYSTERECTOMY      INJECTION FOR SENTINEL NODE IDENTIFICATION Left 8/1/2019    Procedure: INJECTION, FOR SENTINEL NODE IDENTIFICATION;  Surgeon: Samia Fulton MD;  Location: [facility] OR [address] [place] [address];  Service: General;  Laterality: Left;    INJECTION OF BOTULINUM TOXIN TYPE A N/A 10/8/2018    Procedure: INJECTION, BOTULINUM TOXIN, TYPE A 300 UNITS;  Surgeon: Ronel Whittington MD;  Location: [address];  Service: Urology;  Laterality: N/A;    INJECTION OF BOTULINUM TOXIN TYPE A N/A 3/25/2019    Procedure: INJECTION, BOTULINUM TOXIN, TYPE A 300 UNITS;  Surgeon: Ronel Whittington MD;  Location: [address];  Service: Urology;  Laterality: N/A;    INJECTION OF BOTULINUM TOXIN TYPE A N/A 8/26/2019    Procedure: INJECTION, BOTULINUM TOXIN, TYPE A 300 UNITS;  Surgeon: Ronel Whittington MD;  Location: [address];  Service: Urology;  Laterality: N/A;    MASTECTOMY Left 8/1/2019    Procedure: MASTECTOMY 23 hour stay;  Surgeon: Samia Fulton MD;  Location: [facility] OR [address] [place] [address];  Service: General;  Laterality: Left;    MEDIPORT REMOVAL Right 6/24/2022    Procedure: REMOVAL, CATHETER, CENTRAL VENOUS, TUNNELED, WITH PORT;  Surgeon: Isauro Anderson MD;  Location: [facility] CATH LAB;  Service: Radiology;  Laterality: Right;    OVARIAN CYST SURGERY  1985    PERCUTANEOUS TRANSLUMINAL ANGIOPLASTY OF ARTERIOVENOUS FISTULA Left 4/29/2024    Procedure: PTA, AV FISTULA;  Surgeon: RODRIGO Friedman III, MD;  Location: [facility] CATH LAB;  Service: Peripheral Vascular;  Laterality: Left;    REPLACEMENT OF STENT Left 12/23/2021    Procedure: REPLACEMENT, STENT;  Surgeon: Ronel Whittington MD;  Location: [facility]  OR 1ST FLR;  Service: Urology;  Laterality: Left;    REPLACEMENT OF STENT Left 2022    Procedure: REPLACEMENT, STENT;  Surgeon: Ronel Whittington MD;  Location: [facility] OR 1ST FLR;  Service: Urology;  Laterality: Left;    RETROGRADE PYELOGRAPHY Left 2022    Procedure: PYELOGRAM, RETROGRADE;  Surgeon: Ronel Whittington MD;  Location: [facility] OR 1ST FLR;  Service: Urology;  Laterality: Left;    SENTINEL LYMPH NODE BIOPSY Left 2019    Procedure: BIOPSY, LYMPH NODE, SENTINEL;  Surgeon: Samia Fulton MD;  Location: [facility] OR 2ND FLR;  Service: General;  Laterality: Left;    spt placement      TONSILLECTOMY, ADENOIDECTOMY      TOTAL ABDOMINAL HYSTERECTOMY W/ BILATERAL SALPINGOOPHORECTOMY  1985    URETEROSCOPY Left 2022    Procedure: URETEROSCOPY;  Surgeon: Ronel Whittington MD;  [facility];  Service: Urology;  Laterality: Left;       Review of patient's allergies indicates:  No Known Allergies    Current Facility-Administered Medications on File Prior to Encounter   Medication    epoetin chloe injection 2,000 Units    [COMPLETED] epoetin chloe injection 4,000 Units    [] heparin (porcine) injection 1,000 Units    heparin (porcine) injection 2,000 Units    heparin (porcine) injection 2,000 Units    [COMPLETED] heparin (porcine) injection 2,000 Units    heparin (porcine) injection 4,000 Units    iron sucrose injection 100 mg     Current Outpatient Medications on File Prior to Encounter   Medication Sig    amLODIPine (NORVASC) 10 MG tablet TAKE 1 TABLET BY MOUTH EVERY DAY    anastrozole (ARIMIDEX) 1 mg Tab TAKE 1 TABLET BY MOUTH EVERY DAY (Patient taking differently: Take 1 mg by mouth once daily.)    carvediloL (COREG) 12.5 MG tablet Take 1 tablet (12.5 mg total) by mouth 2 (two) times daily.    diclofenac sodium (VOLTAREN) 1 % Gel Apply 2 g topically 2 (two) times daily as needed (arthritis).    DULoxetine (CYMBALTA) 20 MG capsule TAKE 2 CAPSULES(40 MG) BY MOUTH DAILY (Patient  taking differently: Take 40 mg by mouth once daily.)    ELIQUIS 5 mg Tab TAKE 1 TABLET BY MOUTH TWICE A DAY (Patient taking differently: Take 5 mg by mouth Daily. Pt takes once a day)    ergocalciferol (ERGOCALCIFEROL) 50,000 unit Cap Take 1 capsule (50,000 Units total) by mouth every 7 days.    furosemide (LASIX) 80 MG tablet Take 1 tablet (80 mg total) by mouth once daily.    HYDROcodone-acetaminophen (NORCO)  mg per tablet Take 1 tablet by mouth every 6 (six) hours as needed for Pain.    lactulose (CHRONULAC) 10 gram/15 mL solution Take 20 g by mouth 2 (two) times daily as needed (constipation).    LANTUS SOLOSTAR U-100 INSULIN 100 unit/mL (3 mL) InPn pen INJECT 12-15 UNITS UNDER THE SKIN at night    losartan (COZAAR) 100 MG tablet TAKE 1 TABLET(100 MG) BY MOUTH DAILY    magnesium oxide (MAG-OX) 400 mg (241.3 mg magnesium) tablet Take 1 tablet (400 mg total) by mouth once daily.    methocarbamoL (ROBAXIN) 750 MG Tab TAKE 1 TO 2 TABLETS(750 TO 1500 MG) BY MOUTH THREE TIMES DAILY AS NEEDED FOR MUSCLE SPASMS (Patient taking differently: Take 1,500 mg by mouth 3 (three) times daily. TAKE 1 TO 2 TABLETS(750 TO 1500 MG) BY MOUTH THREE TIMES DAILY AS NEEDED FOR MUSCLE SPASMS)    oxybutynin (DITROPAN-XL) 10 MG 24 hr tablet TAKE 1 TABLET BY MOUTH EVERY DAY (Patient taking differently: Take 10 mg by mouth once daily.)    oxyCODONE (ROXICODONE) 5 MG immediate release tablet Take 1 tablet (5 mg total) by mouth daily as needed (severe pain).    sumatriptan (IMITREX) 100 MG tablet Take 1 tablet (100 mg total) by mouth every 2 (two) hours as needed for Migraine (Limit 2 in 14 hours and 9 in one month).    SYNTHROID 50 mcg tablet TAKE 1 TABLET BY MOUTH BEFORE BREAKFAST. ADMINISTER ON AN EMPTY STOMACH AT LEAST 30 MINUTES BEFORE FOOD. IF RECEIVING TUBE FEEDS, HOLD TUBE FEEDS FOR 1 HOUR BEFORE AND AFTER LEVOTHYROXINE ADMINISTRATION.    traZODone (DESYREL) 100 MG tablet TAKE 1 TABLET BY MOUTH NIGHTLY AS NEEDED FOR INSOMNIA.  "(Patient taking differently: Take 100 mg by mouth every evening.)    BD INSULIN SYRINGE ULTRA-FINE 0.5 mL 31 gauge x 5/16" Syrg USE WITH INSULIN 4 TIMES A DAY    erenumab-aooe (AIMOVIG AUTOINJECTOR) 140 mg/mL autoinjector 140 mg MONTHLY (route: subcutaneous)    ipratropium (ATROVENT) 21 mcg (0.03 %) nasal spray USE 2 SPRAYS IN EACH NOSTRIL TWICE DAILY    pen needle, diabetic (BD ULTRA-FINE SHORT PEN NEEDLE) 31 gauge x 5/16" Ndle USE WITH LANTUS DAILY, e 11.65    sodium bicarbonate 650 MG tablet Take 1 tablet (650 mg total) by mouth once daily.    [DISCONTINUED] calcium acetate,phosphat bind, (PHOSLO) 667 mg capsule Take 667 mg by mouth 3 (three) times daily with meals.     Family History       Problem Relation (Age of Onset)    ALS Mother    Cancer Maternal Grandmother, Paternal Grandfather, Brother    Diabetes Sister, Maternal Aunt, Maternal Uncle    Kidney disease Sister, Maternal Aunt    Prostate cancer Brother    Scoliosis Brother          Tobacco Use    Smoking status: Never    Smokeless tobacco: Never   Substance and Sexual Activity    Alcohol use: No     Alcohol/week: 0.0 standard drinks of alcohol    Drug use: No    Sexual activity: Not Currently     Birth control/protection: Abstinence     Review of Systems   Constitutional:  Negative for activity change.   HENT: Negative.     Respiratory: Negative.     Cardiovascular: Negative.    Gastrointestinal: Negative.    Genitourinary:  Negative for dysuria.   Musculoskeletal: Negative.    Psychiatric/Behavioral: Negative.       Objective:     Vital Signs (Most Recent):  Temp: 98.6 °F (37 °C) (04/16/25 2300)  Pulse: 76 (04/17/25 0300)  Resp: 20 (04/16/25 2330)  BP: (!) 156/69 (04/17/25 0300)  SpO2: 97 % (04/17/25 0300) Vital Signs (24h Range):  Temp:  [98 °F (36.7 °C)-98.6 °F (37 °C)] 98.6 °F (37 °C)  Pulse:  [60-85] 76  Resp:  [17-20] 20  SpO2:  [95 %-100 %] 97 %  BP: (125-194)/(62-90) 156/69     Weight: 96.6 kg (213 lb)  Body mass index is 32.39 kg/m².   " "  Physical Exam  Vitals and nursing note reviewed.   Constitutional:       General: She is not in acute distress.     Appearance: She is obese.   HENT:      Head: Normocephalic and atraumatic.   Eyes:      General: No scleral icterus.  Cardiovascular:      Rate and Rhythm: Normal rate and regular rhythm.      Comments: LUE AV-fistula w/ thrill/bruit  Pulmonary:      Effort: Pulmonary effort is normal. No respiratory distress.      Breath sounds: No wheezing.   Musculoskeletal:      Right lower leg: 3+ Pitting Edema (mid-shin) present.      Left lower leg: 3+ Pitting Edema (mid-shin) present.   Skin:     General: Skin is warm.      Coloration: Skin is pale.   Neurological:      General: No focal deficit present.      Mental Status: She is alert and oriented to person, place, and time.   Psychiatric:         Mood and Affect: Mood normal.         Behavior: Behavior normal.                Significant Labs: All pertinent labs within the past 24 hours have been reviewed.  CBC:   Recent Labs   Lab 04/16/25  1841   WBC 6.77   HGB 11.8*   HCT 37.4        CMP:   Recent Labs   Lab 04/16/25  1841   *   K 3.5   CL 97   CO2 24   BUN 18   CREATININE 1.9*   CALCIUM 8.3*   ALBUMIN 2.5*   BILITOT 0.3   ALKPHOS 137   AST 23   ALT 18   ANIONGAP 9     Cardiac Markers: No results for input(s): "CKMB", "MYOGLOBIN", "BNP", "TROPISTAT" in the last 48 hours.  Coagulation: No results for input(s): "PT", "INR", "APTT" in the last 48 hours.  Lactic Acid: No results for input(s): "LACTATE" in the last 48 hours.  Magnesium: No results for input(s): "MG" in the last 48 hours.  Urine Culture: No results for input(s): "LABURIN" in the last 48 hours.  Urine Studies:   Recent Labs   Lab 04/16/25  2106   COLORU [place]*   APPEARANCEUA Cloudy*   PHUR 7.0   SPECGRAV 1.020   PROTEINUA 3+*   GLUCUA 2+*   BILIRUBINUA Negative   OCCULTUA 3+*   NITRITE Negative   UROBILINOGEN Negative   LEUKOCYTESUR 3+*   RBCUA >100*   WBCUA >100*   BACTERIA Many* "   HYALINECASTS 0       Significant Imaging: I have reviewed all pertinent imaging results/findings within the past 24 hours.

## 2025-04-17 NOTE — ASSESSMENT & PLAN NOTE
Hub staff attempted to follow warm transfer process and was unsuccessful     Caller: Irais Barrett    Relationship to patient: Self    Best call back number: [phone]    Patient is needing: APPOINTMENT IN February, BEFORE FEB 19, SHE WILL BE LEAVING [facility] FOR 3 WEEKS TILL AFTER MARCH 15           Patient's FSGs are controlled on current medication regimen.  Last A1c reviewed-   Lab Results   Component Value Date    HGBA1C 5.4 04/16/2025     Most recent fingerstick glucose reviewed-   Recent Labs   Lab 04/17/25  0231 04/17/25  0402 04/17/25  0814   POCTGLUCOSE 192* 156* 118*     Current correctional scale  Low  Maintain anti-hyperglycemic dose as follows-   Antihyperglycemics (From admission, onward)      Start     Stop Route Frequency Ordered    04/17/25 0300  insulin glargine U-100 (Lantus) pen 10 Units         -- SubQ Nightly 04/17/25 0259    04/17/25 0111  insulin aspart U-100 pen 0-5 Units         -- SubQ Before meals & nightly PRN 04/17/25 0012          Hold Oral hypoglycemics while patient is in the hospital.

## 2025-04-17 NOTE — PLAN OF CARE
Problem: Skin Injury Risk Increased  Goal: Skin Health and Integrity  Outcome: Progressing     Problem: Adult Inpatient Plan of Care  Goal: Plan of Care Review  Outcome: Progressing  Goal: Patient-Specific Goal (Individualized)  Outcome: Progressing  Goal: Absence of Hospital-Acquired Illness or Injury  Outcome: Progressing  Goal: Optimal Comfort and Wellbeing  Outcome: Progressing  Goal: Readiness for Transition of Care  Outcome: Progressing     Problem: Infection  Goal: Absence of Infection Signs and Symptoms  Outcome: Progressing     Problem: Hemodialysis  Goal: Safe, Effective Therapy Delivery  Outcome: Progressing  Goal: Effective Tissue Perfusion  Outcome: Progressing  Goal: Absence of Infection Signs and Symptoms  Outcome: Progressing     Problem: Diabetes Comorbidity  Goal: Blood Glucose Level Within Targeted Range  Outcome: Progressing     Problem: Acute Kidney Injury/Impairment  Goal: Fluid and Electrolyte Balance  Outcome: Progressing  Goal: Improved Oral Intake  Outcome: Progressing  Goal: Effective Renal Function  Outcome: Progressing     Problem: Wound  Goal: Optimal Coping  Outcome: Progressing  Goal: Optimal Functional Ability  Outcome: Progressing  Goal: Absence of Infection Signs and Symptoms  Outcome: Progressing  Goal: Improved Oral Intake  Outcome: Progressing  Goal: Optimal Pain Control and Function  Outcome: Progressing  Goal: Skin Health and Integrity  Outcome: Progressing  Goal: Optimal Wound Healing  Outcome: Progressing

## 2025-04-17 NOTE — CONSULTS
"[facility] - Emergency Dept  Infectious Disease  Consult Note    Patient Name: Cecile Bowen  MRN: [id number]  Admission Date: 4/16/2025  Hospital Length of Stay: 1 days  Attending Physician: Fariha Abraham MD  Primary Care Provider: Lurdes Navarro MD     Isolation Status: No active isolations    Patient information was obtained from patient and ER records.      Inpatient consult to Infectious Diseases  Consult performed by: Danielle Fragoso DO  Consult ordered by: Hernesto White MD        Assessment/Plan:     Renal/  * Acute cystitis with hematuria  74F with PMH of ESRD on HD MWF via LUE AVF, neurogenic bladder with suprapubic catheter in place, presents with hematuria. Patient has history of ESBL Kleb pneumo and Proteus UTIs, most recently in Jan 2025, treated with gentamicin x1. Was also bacteremic with E faecalis at the time, and was treated with vancomycin. This admission, has received 1 dose of gentamicin in the ER. Currently on vancomycin. Has been afebrile, on room air, no leukocytosis. Suprapubic catheter exchanged in the ED. UCX sent post-exchange, gram stain with GNR and budding yeast.     Can stop vancomycin since gram stain only has GNR and yeast. Has already received 1 dose of gentamicin that should cover the GNR.     Recommendations  Stop vancomycin   Follow up UCX        Thank you for your consult. I will sign off. Please contact us if you have any additional questions.    Danielle Fragoso DO  Infectious Disease  [facility] Emergency Dept    Subjective:     Principal Problem: Acute cystitis with hematuria    HPI: Ms. Bowen is a 74F with PMH of DM2, HTN, ESRD on HD MWF via LUE AVF, neurogenic bladder with suprapubic catheter in place, afib, presents with hematuria. Infectious disease consulted for "hx of MDR ESBL, VSE recurrent UTI - chronic suprapubic catheter. assist with empiric antibiotic therapy".     Patient has history of ESBL Kleb pneumo and Proteus UTIs, most recently in Jan " 2025, treated with gentamicin x1. Was also bacteremic with E faecalis at the time, and was treated with vancomycin. This admission, has received 1 dose of gentamicin in the ER. Currently on vancomycin. Has been afebrile, on room air, no leukocytosis. Suprapubic catheter exchanged in the ED. UCX sent post-exchange, gram stain with GNR and budding yeast.         Past Medical History:   Diagnosis Date    Bacteremia due to Enterococcus 09/28/2021    Catheter-associated urinary tract infection 09/29/2021    Cervicogenic migraine     Chronic pain     CKD (chronic kidney disease) stage 4, GFR 15-29 ml/min     [facility]    CKD (chronic kidney disease) stage 4, GFR 15-29 ml/min     Diabetes mellitus     Long term use of Insulin, Diabetic Neuropathy    Dialysis patient 01/21/2022    Fibromyalgia     Hydronephrosis     Hyperlipidemia     Hypertension 12/12/2012    Hypothyroidism 12/12/2012    ARON (iron deficiency anemia)     Insomnia     Levoscoliosis     Malignant neoplasm of upper-outer quadrant of left breast in female, estrogen receptor positive     Metabolic acidosis     Mobility impaired     Nuclear sclerosis - Both Eyes 03/24/2014    VIJAYA (obstructive sleep apnea)     Osteopenia     Pulmonary nodule     Recurrent UTI     Renal manifestation of secondary diabetes mellitus     Secondary hyperparathyroidism, renal     Urinary retention     Urinary tract infection due to ESBL Klebsiella        Past Surgical History:   Procedure Laterality Date    ANGIOGRAPHY OF UPPER EXTREMITY N/A 9/19/2024    Procedure: Angiogram Extremity Bilateral;  Surgeon: RODRIGO Friedman III, MD;  Location: [facility] OR [address] [place] [address];  Service: Vascular;  Laterality: N/A;  R femoral approach, arch & arm angiogram  168.76mgy  33.1145 gycm2  8.9min    AV FISTULA PLACEMENT Left 2/14/2024    Procedure: CREATION, AV FISTULA;  Surgeon: RODRIGO Friedman III, MD;  Location: [facility] OR [address] [place] [address];  Service: Vascular;  Laterality: Left;  LUE AVF creation vs AVG insertion     BIOPSY OF URETER Left 2/18/2022    Procedure: BIOPSY, URETER;  Surgeon: Ronel Whittington MD;  Location: [facility] OR 1ST FLR;  Service: Urology;  Laterality: Left;    BREAST BIOPSY Right     benign    CHOLECYSTECTOMY      COLONOSCOPY N/A 1/13/2017    Procedure: COLONOSCOPY;  Surgeon: Morris Wiseman MD;  Location: [facility] ENDO (4TH FLR);  Service: Endoscopy;  Laterality: N/A;  Renal pt Nephrology advised to avoid phosphate preps    COLONOSCOPY N/A 12/7/2023    Procedure: COLONOSCOPY;  Surgeon: Herve Allen MD;  Location: [facility] ENDO (2ND FLR);  Service: Endoscopy;  Laterality: N/A;  HD MWF; labs prior  suprapubic catheter  pt does not ambulate-uses christina lift- will have sling with her  9/7 ref. by Anastasiia Campbell DO, PEG, instr. mailed, Eliquis hold approval pending-[facility] to hold Eliquis 2 days per Dr Navarro-GT  1/30-precall complete-MS    CYSTOSCOPY N/A 10/8/2018    Procedure: CYSTOSCOPY;  Surgeon: Ronel Whittington MD;  [facility];  Service: Urology;  Laterality: N/A;  45 min    CYSTOSCOPY N/A 3/25/2019    Procedure: CYSTOSCOPY;  Surgeon: Ronel Whittington MD;  [facility];  Service: Urology;  Laterality: N/A;  45 min    CYSTOSCOPY N/A 8/26/2019    Procedure: CYSTOSCOPY;  Surgeon: Ronel Whittington MD;  [facility];  Service: Urology;  Laterality: N/A;  45 min    CYSTOSCOPY N/A 7/2/2021    Procedure: CYSTOSCOPY;  Surgeon: Ronel Whittington MD;  [facility];  Service: Urology;  Laterality: N/A;    CYSTOSCOPY  12/23/2021    Procedure: CYSTOSCOPY;  Surgeon: Ronel Whittington MD;  [facility];  Service: Urology;;    CYSTOSCOPY  2/18/2022    Procedure: CYSTOSCOPY;  Surgeon: Ronel Whittington MD;  Location: [facility] OR [address] [place] [address];  Service: Urology;;    CYSTOSCOPY W/ URETERAL STENT PLACEMENT Left 5/15/2021    Procedure: CYSTOSCOPY, WITH URETERAL STENT INSERTION;  Surgeon: Levy Sánchez Jr., MD;  [facility];  Service: Urology;  Laterality: Left;    CYSTOSCOPY WITH BIOPSY OF BLADDER N/A 1/27/2020    Procedure: CYSTOSCOPY, WITH BLADDER BIOPSY, WITH FULGURATION IF INDICATED;  Surgeon: Ronel Whittington MD;  [facility];  Service: Urology;  Laterality: N/A;    DILATION AND CURETTAGE OF UTERUS      FISTULOGRAM N/A 4/29/2024    Procedure: Fistulogram;  Surgeon: RODRIGO Friedman III, MD;  Location: [facility] CATH LAB;  Service: Peripheral Vascular;  Laterality: N/A;    FISTULOGRAM Left 1/6/2025    Procedure: Fistulogram;  Surgeon: RODRIGO Friedman III, MD;  Location: [facility] CATH LAB;  Service: Peripheral Vascular;  Laterality: Left;    GALLBLADDER SURGERY  2006    HYSTERECTOMY      INJECTION FOR SENTINEL NODE IDENTIFICATION Left 8/1/2019    Procedure: INJECTION, FOR SENTINEL NODE IDENTIFICATION;  Surgeon: Samia Fulton MD;  Location: [facility] OR [address] [place] [address];  Service: General;  Laterality: Left;    INJECTION OF BOTULINUM TOXIN TYPE A N/A 10/8/2018    Procedure: INJECTION, BOTULINUM TOXIN, TYPE A 300 UNITS;  Surgeon: Ronel Whittington MD;  Location: [address];  Service: Urology;  Laterality: N/A;    INJECTION OF BOTULINUM TOXIN TYPE A N/A 3/25/2019    Procedure: INJECTION, BOTULINUM TOXIN, TYPE A 300 UNITS;  Surgeon: Ronel Whittington MD;  Location: [address];  Service: Urology;  Laterality: N/A;    INJECTION OF BOTULINUM TOXIN TYPE A N/A 8/26/2019    Procedure: INJECTION, BOTULINUM TOXIN, TYPE A 300 UNITS;  Surgeon: Ronel Whittington MD;  Location: [address];  Service: Urology;  Laterality: N/A;    MASTECTOMY Left 8/1/2019    Procedure: MASTECTOMY 23 hour stay;  Surgeon: Samia Fulton MD;  Location: [facility] OR 2ND [facility];  Service: General;  Laterality: Left;    MEDIPORT REMOVAL Right 6/24/2022    Procedure: REMOVAL, CATHETER, CENTRAL VENOUS, TUNNELED, WITH PORT;  Surgeon: Isauro Anderson MD;  Location: [facility] CATH LAB;  Service: Radiology;  Laterality: Right;    OVARIAN CYST SURGERY  " 1985    PERCUTANEOUS TRANSLUMINAL ANGIOPLASTY OF ARTERIOVENOUS FISTULA Left 4/29/2024    Procedure: PTA, AV FISTULA;  Surgeon: RODRIGO Friedman III, MD;  Location: [facility] CATH LAB;  Service: Peripheral Vascular;  Laterality: Left;    REPLACEMENT OF STENT Left 12/23/2021    Procedure: REPLACEMENT, STENT;  Surgeon: Ronel Whittington MD;  Location: [facility] OR [facility] FLR;  Service: Urology;  Laterality: Left;    REPLACEMENT OF STENT Left 2/18/2022    Procedure: REPLACEMENT, STENT;  Surgeon: Ronel Whittington MD;  Location: [facility] OR 1ST FLR;  Service: Urology;  Laterality: Left;    RETROGRADE PYELOGRAPHY Left 2/18/2022    Procedure: PYELOGRAM, RETROGRADE;  Surgeon: Ronel Whittington MD;  Location: [facility] OR [facility] FLR;  Service: Urology;  Laterality: Left;    SENTINEL LYMPH NODE BIOPSY Left 8/1/2019    Procedure: BIOPSY, LYMPH NODE, SENTINEL;  Surgeon: Samia Fulton MD;  Location: [facility] OR 2ND FLR;  Service: General;  Laterality: Left;    spt placement      TONSILLECTOMY, ADENOIDECTOMY      TOTAL ABDOMINAL HYSTERECTOMY W/ BILATERAL SALPINGOOPHORECTOMY  1985    URETEROSCOPY Left 2/18/2022    Procedure: URETEROSCOPY;  Surgeon: Ronel Whittington MD;  [facility];  Service: Urology;  Laterality: Left;       Review of patient's allergies indicates:  No Known Allergies    Medications:  (Not in a hospital admission)    Antibiotics (From admission, onward)      Start     Stop Route Frequency Ordered    04/16/25 2241  vancomycin - pharmacy to dose  (vancomycin IVPB (PEDS and ADULTS))        Placed in "And" Linked Group    -- IV pharmacy to manage frequency 04/16/25 2142          Antifungals (From admission, onward)      None          Antivirals (From admission, onward)      None             Immunization History   Administered Date(s) Administered    COVID-19 Vaccine 02/25/2021, 03/25/2021    COVID-19, MRNA, LN-S, PF (MODERNA FULL 0.5 ML DOSE) 02/25/2021, 03/25/2021    COVID-19, MRNA, LN-S, PF (Pfizer) (Purple " Cap) 12/24/2021    Hepatitis B 11/03/2023, 12/08/2023    Hepatitis B, Adult 11/03/2023, 12/08/2023, 07/19/2024, 08/16/2024, 09/20/2024, 12/20/2024    Influenza (FLUAD) - Quadrivalent - Adjuvanted - PF *Preferred* (65+) 01/30/2023    Influenza - Quadrivalent 10/01/2014    Influenza - Quadrivalent - PF *Preferred* (6 months and older) 09/27/2023    Influenza - Trivalent - Fluarix, Flulaval, Fluzone, Afluria - PF 09/27/2024    Influenza - Trivalent - Fluzone High Dose - PF (65 years and older) 09/27/2017, 10/31/2018, 09/25/2019    PPD Test 07/01/2021, 12/21/2021, 01/03/2022, 01/25/2022    Pneumococcal Conjugate - 13 Valent 09/27/2017    Pneumococcal Polysaccharide - 23 Valent 09/19/2016, 01/30/2023    Zoster 12/16/2013       Family History       Problem Relation (Age of Onset)    ALS Mother    Cancer Maternal Grandmother, Paternal Grandfather, Brother    Diabetes Sister, Maternal Aunt, Maternal Uncle    Kidney disease Sister, Maternal Aunt    Prostate cancer Brother    Scoliosis Brother          Social History     Socioeconomic History    Marital status:     Number of children: 0    Highest education level: Master's degree (e.g., MA, MS, [place], MEd, MSW, BANDAR)   Occupational History    Occupation: teacher     Comment: retired   Tobacco Use    Smoking status: Never    Smokeless tobacco: Never   Substance and Sexual Activity    Alcohol use: No     Alcohol/week: 0.0 standard drinks of alcohol    Drug use: No    Sexual activity: Not Currently     Birth control/protection: Abstinence   Social History Narrative    Single ()             Brother passed away last year.  Pt lives her her sister. (5/27)     Social Drivers of Health     Financial Resource Strain: Low Risk  (1/19/2025)    Overall Financial Resource Strain (CARDIA)     Difficulty of Paying Living Expenses: Not very hard   Food Insecurity: No Food Insecurity (1/19/2025)    Hunger Vital Sign     Worried About Running Out of Food in the Last Year: Never  true     Ran Out of Food in the Last Year: Never true   Transportation Needs: No Transportation Needs (12/31/2024)    TRANSPORTATION NEEDS     Transportation : No   Physical Activity: Inactive (1/19/2025)    Exercise Vital Sign     Days of Exercise per Week: 0 days     Minutes of Exercise per Session: 0 min   Stress: No Stress Concern Present (1/19/2025)    [employer] [place] [employer] - Occupational Stress Questionnaire     Feeling of Stress : Only a little   Housing Stability: Unknown (1/19/2025)    Housing Stability Vital Sign     Unable to Pay for Housing in the Last Year: No     Homeless in the Last Year: No     Review of Systems   Constitutional:  Positive for fatigue. Negative for chills and fever.   Respiratory:  Negative for cough and shortness of breath.    Cardiovascular:  Negative for chest pain.   Gastrointestinal:  Negative for abdominal pain, constipation, diarrhea, nausea and vomiting.   Genitourinary:  Negative for dysuria and hematuria.   Musculoskeletal:  Negative for arthralgias and myalgias.   Skin:  Negative for rash.   Neurological:  Negative for weakness and headaches.     Objective:     Vital Signs (Most Recent):  Temp: 98.5 °F (36.9 °C) (04/17/25 0957)  Pulse: 75 (04/17/25 1100)  Resp: 20 (04/17/25 1009)  BP: (!) 166/77 (04/17/25 1000)  SpO2: 99 % (04/17/25 1100) Vital Signs (24h Range):  Temp:  [97.5 °F (36.4 °C)-98.6 °F (37 °C)] 98.5 °F (36.9 °C)  Pulse:  [60-94] 75  Resp:  [11-20] 20  SpO2:  [95 %-100 %] 99 %  BP: (128-194)/(62-99) 166/77     Weight: 96.6 kg (213 lb)  Body mass index is 32.39 kg/m².    Estimated Creatinine Clearance: 29.4 mL/min (A) (based on SCr of 2.1 mg/dL (H)).     Physical Exam  Vitals reviewed.   Constitutional:       General: She is not in acute distress.     Appearance: Normal appearance. She is not ill-appearing.   HENT:      Head: Normocephalic and atraumatic.   Eyes:      Extraocular Movements: Extraocular movements intact.      Conjunctiva/sclera:  Conjunctivae normal.   Cardiovascular:      Rate and Rhythm: Normal rate and regular rhythm.      Heart sounds: No murmur heard.  Pulmonary:      Effort: Pulmonary effort is normal. No respiratory distress.      Breath sounds: Normal breath sounds. No wheezing.   Abdominal:      General: Abdomen is flat. Bowel sounds are normal.      Palpations: Abdomen is soft.      Tenderness: There is no abdominal tenderness.      Comments: Suprapubic catheter in place   Musculoskeletal:      Cervical back: Normal range of motion.      Comments: LUE AVF   Skin:     General: Skin is warm and dry.   Neurological:      General: No focal deficit present.      Mental Status: She is alert and oriented to person, place, and time.   Psychiatric:         Mood and Affect: Mood normal.         Behavior: Behavior normal.         Thought Content: Thought content normal.          Significant Labs: All pertinent labs within the past 24 hours have been reviewed.  Recent Lab Results  (Last 5 results in the past 24 hours)        04/17/25  0814   04/17/25  0453   04/17/25  0402   04/17/25  0233   04/17/25  0231        Albumin   2.2             Anion Gap   8             PTT   32.5  Comment: Refer to local heparin nomogram for intensity/dose specific therapeutic range.             Baso #   0.03             Basophil %   0.4             BUN   23             Calcium   8.1             Chloride   101             CO2   22             Creatinine   2.1             eGFR   25  Comment: Estimated GFR calculated using the CKD-EPI creatinine (2021) equation.             Eos #   0.25             Eos %   3.5             Gentamicin, Peak       5.9         Glucose   139             GRAM STAIN       Rare WBC seen                Many Gram Negative Rods                Moderate Budding yeast         Gran # (ANC)   4.81             Group & Rh   A POS             Hematocrit   37.3             Hemoglobin   11.4             Immature Grans (Abs)   0.08  Comment: Mild  elevation in immature granulocytes is non specific and can be seen in a variety of conditions including stress response, acute inflammation, trauma and pregnancy. Correlation with other laboratory and clinical findings is essential.             Immature Granulocytes   1.1             INDIRECT ALEXEY   NEG             INR   1.0  Comment: Coumadin Therapy:    2.0 - 3.0 for INR for all indicators except mechanical heart valves    and antiphospholipid syndromes which should use 2.5 - 3.5.             Lymph #   1.08             Lymph %   15.0             Magnesium    1.7             MCH   29.0             MCHC   30.6             MCV   95             Mono #   0.93             Mono %   13.0             MPV   8.9             Neut %   67.0             nRBC   0             Phosphorus Level   2.9             Platelet Count   254             POCT Glucose 118     156     192       Potassium   3.6             PT   11.2             RBC   3.93             RDW   14.0             Sodium   131             Specimen Outdate   04/20/2025 23:59             WBC   7.18                                    Significant Imaging: I have reviewed all pertinent imaging results/findings within the past 24 hours.

## 2025-04-17 NOTE — ASSESSMENT & PLAN NOTE
Anemia is likely due to chronic disease due to Chronic Kidney Disease. Most recent hemoglobin and hematocrit are listed below.  Recent Labs     04/16/25  1841   HGB 11.8*   HCT 37.4     Plan  - Monitor serial CBC: Daily  - Report of gross hematuria - that may be resolving already. Hold eliquis, check type & screen in AM - trend CBC

## 2025-04-17 NOTE — ASSESSMENT & PLAN NOTE
Patient's blood pressure range in the last 24 hours was: BP  Min: 125/67  Max: 194/71.The patient's inpatient anti-hypertensive regimen is listed below:  Current Antihypertensives  amLODIPine tablet 10 mg, Daily, Oral  carvediloL tablet 12.5 mg, 2 times daily with meals, Oral  furosemide tablet 80 mg, Daily, Oral  losartan tablet 100 mg, Daily, Oral    Plan  - BP is uncontrolled, will adjust as follows: resume home medications

## 2025-04-17 NOTE — SUBJECTIVE & OBJECTIVE
Past Medical History:   Diagnosis Date    Bacteremia due to Enterococcus 09/28/2021    Catheter-associated urinary tract infection 09/29/2021    Cervicogenic migraine     Chronic pain     CKD (chronic kidney disease) stage 4, GFR 15-29 ml/min     [facility]    CKD (chronic kidney disease) stage 4, GFR 15-29 ml/min     Diabetes mellitus     Long term use of Insulin, Diabetic Neuropathy    Dialysis patient 01/21/2022    Fibromyalgia     Hydronephrosis     Hyperlipidemia     Hypertension 12/12/2012    Hypothyroidism 12/12/2012    ARON (iron deficiency anemia)     Insomnia     Levoscoliosis     Malignant neoplasm of upper-outer quadrant of left breast in female, estrogen receptor positive     Metabolic acidosis     Mobility impaired     Nuclear sclerosis - Both Eyes 03/24/2014    VIJAYA (obstructive sleep apnea)     Osteopenia     Pulmonary nodule     Recurrent UTI     Renal manifestation of secondary diabetes mellitus     Secondary hyperparathyroidism, renal     Urinary retention     Urinary tract infection due to ESBL Klebsiella        Past Surgical History:   Procedure Laterality Date    ANGIOGRAPHY OF UPPER EXTREMITY N/A 9/19/2024    Procedure: Angiogram Extremity Bilateral;  Surgeon: RODRIGO Friedman III, MD;  Location: [facility] OR 2ND FLR;  Service: Vascular;  Laterality: N/A;  R femoral approach, arch & arm angiogram  168.76mgy  33.1145 gycm2  8.9min    AV FISTULA PLACEMENT Left 2/14/2024    Procedure: CREATION, AV FISTULA;  Surgeon: RODRIGO Friedman III, MD;  Location: [facility] OR 2ND FLR;  Service: Vascular;  Laterality: Left;  LUE AVF creation vs AVG insertion    BIOPSY OF URETER Left 2/18/2022    Procedure: BIOPSY, URETER;  Surgeon: Ronel Whittington MD;  Location: [facility] OR 1ST FLR;  Service: Urology;  Laterality: Left;    BREAST BIOPSY Right     benign    CHOLECYSTECTOMY      COLONOSCOPY N/A 1/13/2017    Procedure: COLONOSCOPY;  Surgeon: Morris Wiseman MD;  Location: [facility] ENDO (4TH FLR);  Service: Endoscopy;   Laterality: N/A;  Renal pt Nephrology advised to avoid phosphate preps    COLONOSCOPY N/A 12/7/2023    Procedure: COLONOSCOPY;  Surgeon: Herve Allen MD;  Location: [facility] ENDO (2ND FLR);  Service: Endoscopy;  Laterality: N/A;  HD MWF; labs prior  suprapubic catheter  pt does not ambulate-uses christina lift- will have sling with her  9/7 ref. by Anastasiia Campbell, , PEG, instr. mailed, Eliquis hold approval pending-[facility] to hold Eliquis 2 days per Dr Navarro-GT  1/30-precall complete-MS    CYSTOSCOPY N/A 10/8/2018    Procedure: CYSTOSCOPY;  Surgeon: Ronel Whittington MD;  Location: [facility] OR 1ST FLR;  Service: Urology;  Laterality: N/A;  45 min    CYSTOSCOPY N/A 3/25/2019    Procedure: CYSTOSCOPY;  Surgeon: Ronel Whittington MD;  Location: [facility] OR 1ST FLR;  Service: Urology;  Laterality: N/A;  45 min    CYSTOSCOPY N/A 8/26/2019    Procedure: CYSTOSCOPY;  Surgeon: Ronel Whittington MD;  [facility];  Service: Urology;  Laterality: N/A;  45 min    CYSTOSCOPY N/A 7/2/2021    Procedure: CYSTOSCOPY;  Surgeon: Ronel Whittington MD;  [facility];  Service: Urology;  Laterality: N/A;    CYSTOSCOPY  12/23/2021    Procedure: CYSTOSCOPY;  Surgeon: Ronel Whittington MD;  [facility];  Service: Urology;;    CYSTOSCOPY  2/18/2022    Procedure: CYSTOSCOPY;  Surgeon: Ronel Whittington MD;  [facility];  Service: Urology;;    CYSTOSCOPY W/ URETERAL STENT PLACEMENT Left 5/15/2021    Procedure: CYSTOSCOPY, WITH URETERAL STENT INSERTION;  Surgeon: Levy Sánchez Jr., MD;  Location: [facility] OR 1ST FLR;  Service: Urology;  Laterality: Left;    CYSTOSCOPY WITH BIOPSY OF BLADDER N/A 1/27/2020    Procedure: CYSTOSCOPY, WITH BLADDER BIOPSY, WITH FULGURATION IF INDICATED;  Surgeon: Ronel Whittington MD;  Location: [facility] OR [address] [place] [address];  Service: Urology;  Laterality: N/A;    DILATION AND CURETTAGE OF UTERUS      FISTULOGRAM N/A 4/29/2024    Procedure:  Fistulogram;  Surgeon: RODRIGO Friedman III, MD;  Location: [facility] CATH LAB;  Service: Peripheral Vascular;  Laterality: N/A;    FISTULOGRAM Left 1/6/2025    Procedure: Fistulogram;  Surgeon: RODRIGO Friedman III, MD;  Location: [facility] CATH LAB;  Service: Peripheral Vascular;  Laterality: Left;    GALLBLADDER SURGERY  2006    HYSTERECTOMY      INJECTION FOR SENTINEL NODE IDENTIFICATION Left 8/1/2019    Procedure: INJECTION, FOR SENTINEL NODE IDENTIFICATION;  Surgeon: Samia Fulton MD;  Location: [facility] OR [address] [place] [address];  Service: General;  Laterality: Left;    INJECTION OF BOTULINUM TOXIN TYPE A N/A 10/8/2018    Procedure: INJECTION, BOTULINUM TOXIN, TYPE A 300 UNITS;  Surgeon: Ronel Whittington MD;  Location: [address];  Service: Urology;  Laterality: N/A;    INJECTION OF BOTULINUM TOXIN TYPE A N/A 3/25/2019    Procedure: INJECTION, BOTULINUM TOXIN, TYPE A 300 UNITS;  Surgeon: Ronel Whittington MD;  Location: [address];  Service: Urology;  Laterality: N/A;    INJECTION OF BOTULINUM TOXIN TYPE A N/A 8/26/2019    Procedure: INJECTION, BOTULINUM TOXIN, TYPE A 300 UNITS;  Surgeon: Ronel Whittington MD;  Location: [address];  Service: Urology;  Laterality: N/A;    MASTECTOMY Left 8/1/2019    Procedure: MASTECTOMY 23 hour stay;  Surgeon: Samia Fulton MD;  Location: [facility] OR [address] [place] [address];  Service: General;  Laterality: Left;    MEDIPORT REMOVAL Right 6/24/2022    Procedure: REMOVAL, CATHETER, CENTRAL VENOUS, TUNNELED, WITH PORT;  Surgeon: Isauro Anderson MD;  Location: [facility] CATH LAB;  Service: Radiology;  Laterality: Right;    OVARIAN CYST SURGERY  1985    PERCUTANEOUS TRANSLUMINAL ANGIOPLASTY OF ARTERIOVENOUS FISTULA Left 4/29/2024    Procedure: PTA, AV FISTULA;  Surgeon: RODRIGO Friedman III, MD;  Location: [facility] CATH LAB;  Service: Peripheral Vascular;  Laterality: Left;    REPLACEMENT OF STENT Left 12/23/2021    Procedure: REPLACEMENT, STENT;  Surgeon: Ronel Whittington MD;  Location: [facility]  OR 1ST FLR;  Service: Urology;  Laterality: Left;    REPLACEMENT OF STENT Left 2/18/2022    Procedure: REPLACEMENT, STENT;  Surgeon: Ronel Whittington MD;  Location: [facility] OR 1ST FLR;  Service: Urology;  Laterality: Left;    RETROGRADE PYELOGRAPHY Left 2/18/2022    Procedure: PYELOGRAM, RETROGRADE;  Surgeon: Ronel Whittington MD;  Location: [facility] OR 1ST FLR;  Service: Urology;  Laterality: Left;    SENTINEL LYMPH NODE BIOPSY Left 8/1/2019    Procedure: BIOPSY, LYMPH NODE, SENTINEL;  Surgeon: Samia Fulton MD;  Location: [facility] OR 2ND FLR;  Service: General;  Laterality: Left;    spt placement      TONSILLECTOMY, ADENOIDECTOMY      TOTAL ABDOMINAL HYSTERECTOMY W/ BILATERAL SALPINGOOPHORECTOMY  1985    URETEROSCOPY Left 2/18/2022    Procedure: URETEROSCOPY;  Surgeon: Ronel Whittington MD;  [facility];  Service: Urology;  Laterality: Left;       Review of patient's allergies indicates:  No Known Allergies  Current Facility-Administered Medications   Medication Frequency    acetaminophen tablet 650 mg Q4H PRN    aluminum-magnesium hydroxide-simethicone 200-200-20 mg/5 mL suspension 30 mL QID PRN    amLODIPine tablet 10 mg Daily    anastrozole tablet 1 mg Daily    apixaban tablet 5 mg Daily    calcium acetate(phosphat bind) capsule 667 mg TID WM    carvediloL tablet 12.5 mg BID WM    dextrose 50% injection 12.5 g PRN    dextrose 50% injection 25 g PRN    diclofenac sodium 1 % gel 2 g BID PRN    DULoxetine DR capsule 40 mg Daily    [START ON 4/22/2025] ergocalciferol capsule 50,000 Units Q7 Days    furosemide tablet 80 mg Daily    glucagon (human recombinant) injection 1 mg PRN    glucose chewable tablet 16 g PRN    glucose chewable tablet 24 g PRN    HYDROcodone-acetaminophen  mg per tablet 1 tablet Q6H PRN    insulin aspart U-100 pen 0-5 Units QID (AC + HS) PRN    insulin glargine U-100 (Lantus) pen 10 Units QHS    ipratropium 42 mcg (0.06 %) nasal spray 2 spray BID    lactulose 20  "gram/30 mL solution Soln 20 g BID PRN    levothyroxine tablet 50 mcg Before breakfast    losartan tablet 100 mg Daily    magnesium oxide tablet 400 mg Daily    melatonin tablet 6 mg Nightly PRN    methocarbamoL tablet 1,500 mg TID    naloxone 0.4 mg/mL injection 0.02 mg PRN    ondansetron injection 4 mg Q8H PRN    oxybutynin 24 hr tablet 10 mg Daily    oxyCODONE immediate release tablet 5 mg Daily PRN    prochlorperazine injection Soln 5 mg Q6H PRN    senna-docusate 8.6-50 mg per tablet 1 tablet BID PRN    sodium chloride 0.9% flush 10 mL Q6H PRN    sumatriptan tablet 100 mg Q2H PRN    traZODone tablet 100 mg QHS    vancomycin - pharmacy to dose pharmacy to manage frequency     Current Outpatient Medications   Medication    amLODIPine (NORVASC) 10 MG tablet    anastrozole (ARIMIDEX) 1 mg Tab    carvediloL (COREG) 12.5 MG tablet    diclofenac sodium (VOLTAREN) 1 % Gel    DULoxetine (CYMBALTA) 20 MG capsule    ELIQUIS 5 mg Tab    ergocalciferol (ERGOCALCIFEROL) 50,000 unit Cap    furosemide (LASIX) 80 MG tablet    HYDROcodone-acetaminophen (NORCO)  mg per tablet    lactulose (CHRONULAC) 10 gram/15 mL solution    LANTUS SOLOSTAR U-100 INSULIN 100 unit/mL (3 mL) InPn pen    losartan (COZAAR) 100 MG tablet    magnesium oxide (MAG-OX) 400 mg (241.3 mg magnesium) tablet    methocarbamoL (ROBAXIN) 750 MG Tab    oxybutynin (DITROPAN-XL) 10 MG 24 hr tablet    oxyCODONE (ROXICODONE) 5 MG immediate release tablet    sucroferric oxyhydroxide (VELPHORO) 500 mg Chew    sumatriptan (IMITREX) 100 MG tablet    SYNTHROID 50 mcg tablet    traZODone (DESYREL) 100 MG tablet    BD INSULIN SYRINGE ULTRA-FINE 0.5 mL 31 gauge x 5/16" Syrg    erenumab-aooe (AIMOVIG AUTOINJECTOR) 140 mg/mL autoinjector    ipratropium (ATROVENT) 21 mcg (0.03 %) nasal spray    pen needle, diabetic (BD ULTRA-FINE SHORT PEN NEEDLE) 31 gauge x 5/16" Ndle    sodium bicarbonate 650 MG tablet     Facility-Administered Medications Ordered in Other Encounters "   Medication Frequency    epoetin chloe injection 2,000 Units 1 time in Clinic/HOD    heparin (porcine) injection 2,000 Units Once    heparin (porcine) injection 2,000 Units Once    heparin (porcine) injection 4,000 Units 1 time in Clinic/HOD    iron sucrose injection 100 mg 1 time in Clinic/HOD     Family History       Problem Relation (Age of Onset)    ALS Mother    Cancer Maternal Grandmother, Paternal Grandfather, Brother    Diabetes Sister, Maternal Aunt, Maternal Uncle    Kidney disease Sister, Maternal Aunt    Prostate cancer Brother    Scoliosis Brother          Tobacco Use    Smoking status: Never    Smokeless tobacco: Never   Substance and Sexual Activity    Alcohol use: No     Alcohol/week: 0.0 standard drinks of alcohol    Drug use: No    Sexual activity: Not Currently     Birth control/protection: Abstinence     Review of Systems   Constitutional:  Positive for fatigue. Negative for chills and fever.   Respiratory:  Negative for cough and shortness of breath.    Cardiovascular:  Negative for chest pain and leg swelling.   Gastrointestinal:  Negative for abdominal pain, diarrhea, nausea and vomiting.   Genitourinary:  Positive for hematuria.   Musculoskeletal:  Positive for back pain. Negative for arthralgias and myalgias.   Neurological:  Negative for weakness and headaches.   Psychiatric/Behavioral: Negative.     Objective:     Vital Signs (Most Recent):  Temp: 98.5 °F (36.9 °C) (04/17/25 0957)  Pulse: 75 (04/17/25 1100)  Resp: 20 (04/17/25 1009)  BP: (!) 166/77 (04/17/25 1000)  SpO2: 99 % (04/17/25 1100) Vital Signs (24h Range):  Temp:  [97.5 °F (36.4 °C)-98.6 °F (37 °C)] 98.5 °F (36.9 °C)  Pulse:  [60-94] 75  Resp:  [11-20] 20  SpO2:  [95 %-100 %] 99 %  BP: (128-194)/(62-99) 166/77     Weight: 96.6 kg (213 lb) (04/16/25 1659)  Body mass index is 32.39 kg/m².  Body surface area is 2.15 meters squared.    No intake/output data recorded.     Physical Exam  Vitals and nursing note reviewed.    Constitutional:       Appearance: She is well-developed.   Eyes:      Pupils: Pupils are equal, round, and reactive to light.   Cardiovascular:      Rate and Rhythm: Normal rate and regular rhythm.   Pulmonary:      Effort: Pulmonary effort is normal.      Breath sounds: No wheezing.      Comments: Slight wheezing to RLL.   Abdominal:      Palpations: Abdomen is soft.      Tenderness: There is no abdominal tenderness.   Musculoskeletal:         General: No tenderness.      Right lower leg: Edema present.      Left lower leg: Edema present.      Comments: 2+ pitting edema to BLE.    Skin:     General: Skin is warm and dry.   Neurological:      Mental Status: She is alert and oriented to person, place, and time.   Psychiatric:         Behavior: Behavior normal.      Significant Labs:  CBC:   Recent Labs   Lab 04/17/25  0453   WBC 7.18   RBC 3.93*   HGB 11.4*   HCT 37.3      MCV 95   MCH 29.0   MCHC 30.6*     CMP:   Recent Labs   Lab 04/16/25  1841 04/17/25  0453   CALCIUM 8.3* 8.1*   ALBUMIN 2.5* 2.2*   * 131*   K 3.5 3.6   CO2 24 22*   CL 97 101   BUN 18 23   CREATININE 1.9* 2.1*   ALKPHOS 137  --    ALT 18  --    AST 23  --    BILITOT 0.3  --      All labs within the past 24 hours have been reviewed.

## 2025-04-17 NOTE — ASSESSMENT & PLAN NOTE
"Patient's FSGs are controlled on current medication regimen.  Last A1c reviewed-   Lab Results   Component Value Date    HGBA1C 5.4 04/16/2025     Most recent fingerstick glucose reviewed- No results for input(s): "POCTGLUCOSE" in the last 24 hours.  Current correctional scale  Low  Maintain anti-hyperglycemic dose as follows-   Antihyperglycemics (From admission, onward)      Start     Stop Route Frequency Ordered    04/17/25 0300  insulin glargine U-100 (Lantus) pen 10 Units         -- SubQ Nightly 04/17/25 0259    04/17/25 0111  insulin aspart U-100 pen 0-5 Units         -- SubQ Before meals & nightly PRN 04/17/25 0012          Hold Oral hypoglycemics while patient is in the hospital.      "

## 2025-04-17 NOTE — SUBJECTIVE & OBJECTIVE
Interval History: NAEON. HDS. Patient reports feeling well this morning. Notes hematuria resolved after FC exchange last night. Denies any suprapubic pain or dysuria. No fever or chills. Notes chronic BLE edema, she is on lasix. Urine cultures pending. ID consulted, jacque main. Will continue vanc.     Review of Systems   Constitutional:  Negative for chills and fever.   Respiratory:  Negative for chest tightness and shortness of breath.    Cardiovascular:  Negative for chest pain and leg swelling.   Gastrointestinal:  Negative for abdominal pain and nausea.   Musculoskeletal:  Positive for back pain.   Neurological:  Negative for dizziness and weakness.     Objective:     Vital Signs (Most Recent):  Temp: 98.6 °F (37 °C) (04/17/25 0724)  Pulse: 78 (04/17/25 0900)  Resp: 20 (04/17/25 0724)  BP: (!) 192/67 (04/17/25 0900)  SpO2: 99 % (04/17/25 0900) Vital Signs (24h Range):  Temp:  [97.5 °F (36.4 °C)-98.6 °F (37 °C)] 98.6 °F (37 °C)  Pulse:  [60-94] 78  Resp:  [11-20] 20  SpO2:  [95 %-100 %] 99 %  BP: (125-194)/(62-99) 192/67     Weight: 96.6 kg (213 lb)  Body mass index is 32.39 kg/m².  No intake or output data in the 24 hours ending 04/17/25 0938      Physical Exam  Vitals and nursing note reviewed.   Constitutional:       Appearance: She is well-developed.   Eyes:      Pupils: Pupils are equal, round, and reactive to light.   Cardiovascular:      Rate and Rhythm: Normal rate and regular rhythm.   Pulmonary:      Effort: Pulmonary effort is normal.      Breath sounds: Wheezing present.      Comments: Slight wheezing to RLL.   Abdominal:      Palpations: Abdomen is soft.      Tenderness: There is no abdominal tenderness.   Musculoskeletal:         General: No tenderness.      Right lower leg: Edema present.      Left lower leg: Edema present.      Comments: 2+ pitting edema to BLE.    Skin:     General: Skin is warm and dry.   Neurological:      Mental Status: She is alert and oriented to person, place, and time.    Psychiatric:         Behavior: Behavior normal.               Significant Labs: All pertinent labs within the past 24 hours have been reviewed.  CBC:   Recent Labs   Lab 04/16/25  1841 04/17/25  0453   WBC 6.77 7.18   HGB 11.8* 11.4*   HCT 37.4 37.3    254     CMP:   Recent Labs   Lab 04/16/25  1841 04/17/25  0453   * 131*   K 3.5 3.6   CL 97 101   CO2 24 22*   BUN 18 23   CREATININE 1.9* 2.1*   CALCIUM 8.3* 8.1*   ALBUMIN 2.5* 2.2*   BILITOT 0.3  --    ALKPHOS 137  --    AST 23  --    ALT 18  --    ANIONGAP 9 8       Significant Imaging: I have reviewed all pertinent imaging results/findings within the past 24 hours.

## 2025-04-17 NOTE — ASSESSMENT & PLAN NOTE
Patient has paroxysmal (<7 days) atrial fibrillation. Patient is currently in sinus rhythm. [id number] Score: 3. The patients heart rate in the last 24 hours is as follows:  Pulse  Min: 60  Max: 94     Antiarrhythmics       Anticoagulants  apixaban tablet 5 mg, Daily, Oral    Plan  - Replete lytes with a goal of K>4, Mg >2  - Patient is not anticoagulated due to report of Hematuria  - Patient's afib is currently controlled

## 2025-04-17 NOTE — HPI
75 y/o WF w/ ESRD on HD (m/f LUE AVF) Neurogenic bladder w/ suprapubic catheter, HTN, Type 2 DM, Afib presents to the ED for hematuria.  Patient noted blood in urine last night and during HD session today - HD staff noted pt with hematuria, patient referred to ED.  Report that on arrival pt with appearance of gross hematuria, SP catheter per patient due for exchange, patient has exchanged monthly.   She also reports noting some blood at site of cystostomy, that is now not apparent since catheter change. She is referred for admission for concern of acute cystitis w/ hematuria. She denies any fevers/chills, patient with chronic debility, requires assitance with modbility & ADLs, she lives with her sister, uses wheelchair for mobility.  She cannot transfer independently requires assistance.  She is on Apixaban 5mg po daily. Last HD prior to presentation was yesterday 4/16. Electrolytes stable. Nephrology consulted for ESRD.

## 2025-04-17 NOTE — ASSESSMENT & PLAN NOTE
74F with PMH of ESRD on HD MWF via LUE AVF, neurogenic bladder with suprapubic catheter in place, presents with hematuria. Patient has history of ESBL Kleb pneumo and Proteus UTIs, most recently in Jan 2025, treated with gentamicin x1. Was also bacteremic with E faecalis at the time, and was treated with vancomycin. This admission, has received 1 dose of gentamicin in the ER. Currently on vancomycin. Has been afebrile, on room air, no leukocytosis. Suprapubic catheter exchanged in the ED. UCX sent post-exchange, gram stain with GNR and budding yeast.     Can stop vancomycin since gram stain only has GNR and yeast. Has already received 1 dose of gentamicin that should cover the GNR.     Recommendations  Stop vancomycin   Follow up UCX

## 2025-04-17 NOTE — ASSESSMENT & PLAN NOTE
Patient would like a blood pregnancy test.  She had a baby in February and her periods have been irregular. She isn't sure when her LMP was.  Advised to get a pregnancy test from the store and if positive to call us.  She then states her sister has Covid and she is wondering what to do.  Covid hotline given and she should not go to get pregnancy test till she calls the hotline.   Patient's blood pressure range in the last 24 hours was: BP  Min: 125/67  Max: 194/71.The patient's inpatient anti-hypertensive regimen is listed below:  Current Antihypertensives  amLODIPine tablet 10 mg, Daily, Oral  carvediloL tablet 12.5 mg, 2 times daily with meals, Oral  furosemide tablet 80 mg, Daily, Oral  losartan tablet 100 mg, Daily, Oral    Plan  - BP is uncontrolled, will adjust as follows: resume home medications

## 2025-04-17 NOTE — ASSESSMENT & PLAN NOTE
Patient has paroxysmal (<7 days) atrial fibrillation. Patient is currently in sinus rhythm. [id number] Score: 3. The patients heart rate in the last 24 hours is as follows:  Pulse  Min: 60  Max: 85     Antiarrhythmics       Anticoagulants  apixaban tablet 5 mg, Daily, Oral    Plan  - Replete lytes with a goal of K>4, Mg >2  - Patient is not anticoagulated due to report of Hematuria  - Patient's afib is currently controlled

## 2025-04-17 NOTE — ED NOTES
Pt turned on left side Sponge wedge applied to back for support  Side rails up times two , Call bell is with in reach of pt and bed is in low position

## 2025-04-17 NOTE — ASSESSMENT & PLAN NOTE
-consult nephrology   -patient reports her phos binder is VELPHORO now - exact dose unknown, [facility]

## 2025-04-17 NOTE — ASSESSMENT & PLAN NOTE
Hyponatremia is likely due to renal insufficiency. The patient's most recent sodium results are listed below.  Recent Labs     04/16/25  1841   *     Plan  -   - Will treat the hyponatremia with Fluid restriction of:  1.5 liter per day  - Monitor sodium Daily.   - Patient hyponatremia is stable

## 2025-04-17 NOTE — PROGRESS NOTES
Gentamicin consult discontinued by provider.  Pharmacy will sign off, please re-consult as needed.  Thank you

## 2025-04-17 NOTE — ASSESSMENT & PLAN NOTE
- S/p doses of Garamycin/vancomycin in ED, will hold further garamcyin pending ID consultation   - S/p SP catheter change and ED reported prior to change pt with gross hematuria - Hgb is stable, check coags, if gross hematuria recurs - consult Urology for evaluation   - HOLD apixaban at this time   - FC exchanged 4/16  - UA infectious, cultures pending  - ID consulted, appreciate recs  - Continue vanc for now.

## 2025-04-17 NOTE — ASSESSMENT & PLAN NOTE
- Q2 turns  - Order low air loss mattress  - Elevate heels off bet  - Wound care consulted, appreciate recs

## 2025-04-17 NOTE — NURSING
Patient awaiting on [facility] for departure back home. AVS reviewed with patient per virtual nurse. PIV removed.

## 2025-04-17 NOTE — ED NOTES
Assumed care of patient at this time. Pt placed in hospital gown and currently lying in stretcher. Vital signs are stable, provided pt with warm blanket. Pt denied restroom use. No other complaints from pt at this time.     Review of patient's allergies indicates:  No Known Allergies  Past Medical History:   Diagnosis Date    Cervicogenic migraine     Chronic pain     CKD (chronic kidney disease) stage 4, GFR 15-29 ml/min     [facility]    CKD (chronic kidney disease) stage 4, GFR 15-29 ml/min     Diabetes mellitus     Long term use of Insulin, Diabetic Neuropathy    Dialysis patient 1/21/2022    Fibromyalgia     Hydronephrosis     Hyperlipidemia     Hypertension 12/12/2012    Hypothyroidism 12/12/2012    ARON (iron deficiency anemia)     Insomnia     Levoscoliosis     Malignant neoplasm of upper-outer quadrant of left breast in female, estrogen receptor positive     Metabolic acidosis     Mobility impaired     Nuclear sclerosis - Both Eyes 3/24/2014    VIJAYA (obstructive sleep apnea)     Osteopenia     Pulmonary nodule     Recurrent UTI     Renal manifestation of secondary diabetes mellitus     Secondary hyperparathyroidism, renal     Urinary retention

## 2025-04-17 NOTE — ASSESSMENT & PLAN NOTE
Anemia is likely due to chronic disease due to Chronic Kidney Disease. Most recent hemoglobin and hematocrit are listed below.  Recent Labs     04/16/25  1841 04/17/25  0453   HGB 11.8* 11.4*   HCT 37.4 37.3     Plan  - Monitor serial CBC: Daily  - Report of gross hematuria - that may be resolving already.  - Hold eliquis   - Trend CBC

## 2025-04-17 NOTE — HOSPITAL COURSE
Ms. Bowen was admitted for acute cystitis. CBC unremarkable. CMP consistent with ESRD. UA infectious, cultures pending. FC exchanged 4/16. ID consulted, appreciate recs. ID consulted who suggested that she can be DCed home given GNR on urine cx that can be treated with Gent.  Patient agreeable to DC.    Ms. Cecile Bowen was deemed appropriate for discharge.  At the time of discharge, the plan of care was discussed with patient/family, who were agreeable and amenable.  All medications were verbally reviewed and discussed with the patient/family.  They endorsed understanding and compliance.  Informed them that these changes will be available on their discharge paperwork, as well.  Outpatient follow-ups were scheduled, or if unable to be scheduled ambulatory referrals were placed, and ER return precautions were given.  All questions were answered to the patient/family's satisfaction.  Ms. Cecile Bowen was subsequently discharged in stable condition.

## 2025-04-18 NOTE — NURSING
Patient left via [facility] stretcher at 1900, patient left in stable condition with all personal belongings.

## 2025-04-18 NOTE — ASSESSMENT & PLAN NOTE
Anemia is likely due to chronic disease due to Chronic Kidney Disease. Most recent hemoglobin and hematocrit are listed below.  Recent Labs     04/16/25  0000 04/16/25  1841 04/17/25  0453   HGB 11.2* 11.8* 11.4*   HCT 35.8* 37.4 37.3     Plan  - Monitor serial CBC: Daily  - Report of gross hematuria - that may be resolving already.  - Hold eliquis   - Trend CBC

## 2025-04-18 NOTE — ASSESSMENT & PLAN NOTE
Patient's FSGs are controlled on current medication regimen.  Last A1c reviewed-   Lab Results   Component Value Date    HGBA1C 5.4 04/16/2025     Most recent fingerstick glucose reviewed-   Recent Labs   Lab 04/17/25  1527   POCTGLUCOSE 151*     Current correctional scale  Low  Maintain anti-hyperglycemic dose as follows-   Antihyperglycemics (From admission, onward)      None          Hold Oral hypoglycemics while patient is in the hospital.

## 2025-04-18 NOTE — DISCHARGE SUMMARY
[facility] - Internal Medicine [facility] Medicine  Discharge Summary      Patient Name: Cecile Bowen  MRN: [id number]  LISA: [id number]  Patient Class: IP- Inpatient  Admission Date: 4/16/2025  Hospital Length of Stay: 1 days  Discharge Date and Time: 4/17/2025  7:11 PM  Attending Physician: No att. providers found   Discharging Provider: Fariha Abraham MD  Primary Care Provider: Lurdes Navarro MD  Hospital Medicine Team: [facility] [place] HOSP MED E Fariha Abrhaam MD  Primary Care Team: [facility] [place] HOSP MED E    HPI:   75 y/o WF w/ ESRD on HD (m/w/f LUE avf) Neurogenic bladder w/ suprapubic catheter, HTN, Type 2 DM, Afib presents to the ED for hematuria.  Patient noted blood in urine last night and during HD session today - HD staff noted pt with hematuria, patient referred to ED.  Report that on arrival pt with appearance of gross hematuria, SP catheter per patient due for exchange, patient has exchanged monthly.   She also reports noting some blood at site of cystostomy, that is now not apparent since catheter change.   Urine tonight now appears yellow and Urine microscopy with >100 RBC and >100WBC.  She is referred for admission for concern of acute cystitis w/ hematuria.   Patient reported that when hematuria develops it is often sign of developing infection.     She denies any fevers/chills, patient with chronic debility, requires assitance with modbility & ADLs, she lives with her sister, uses wheelchair for mobility.  She cannot transfer independently requires assistance.  She is on Apixaban 5mg po daily.     Her last admit to [facility] [place] 12/2024-01/2025 for severe sepsis with enterococcus bacteremia, treated with daptomycin, had MDR ESBL Klebsiella in urien <100k CFU had one dose garamycin.  Tonight patient received Vancomycin and Garamycin x 1 and referred for admission.       * No surgery found *      Hospital Course:   Ms. Bowen was admitted for acute cystitis. CBC unremarkable. CMP consistent with ESRD. UA  infectious, cultures pending. FC exchanged 4/16. ID consulted, appreciate recs. ID consulted who suggested that she can be DCed home given GNR on urine cx that can be treated with Gent.  Patient agreeable to DC.    Ms. Cecile Bowen was deemed appropriate for discharge.  At the time of discharge, the plan of care was discussed with patient/family, who were agreeable and amenable.  All medications were verbally reviewed and discussed with the patient/family.  They endorsed understanding and compliance.  Informed them that these changes will be available on their discharge paperwork, as well.  Outpatient follow-ups were scheduled, or if unable to be scheduled ambulatory referrals were placed, and ER return precautions were given.  All questions were answered to the patient/family's satisfaction.  Ms. Cecile Bowen was subsequently discharged in stable condition.       Goals of Care Treatment Preferences:  Code Status: Full Code    Health care agent: Pooja Sánchez (sister)  Health care agent number: [phone] cell                   SDOH Screening:  The patient was screened for utility difficulties, food insecurity, transport difficulties, housing insecurity, and interpersonal safety and there were no concerns identified this admission.     Consults:   Consults (From admission, onward)          Status Ordering Provider     Inpatient consult to Nephrology  Once        Provider:  (Not yet assigned)    Completed SHIKHA BLACKWOOD     Inpatient consult to Infectious Diseases  Once        Provider:  (Not yet assigned)    Completed SHIKHA BLACKWOOD            Assessment & Plan  Acute cystitis with hematuria  - S/p doses of Garamycin/vancomycin in ED, will hold further garamcyin pending ID consultation   - S/p SP catheter change and ED reported prior to change pt with gross hematuria - Hgb is stable, check coags, if gross hematuria recurs - consult Urology for evaluation   - HOLD apixaban at this time   - FC exchanged  4/16  - UA infectious, cultures pending  - ID consulted, appreciate recs  - Continue vanc for now.    Neurogenic bladder  Chronic suprapubic catheter   - S/p SP catheter exchange 4/16  - Routine catheter care  - Continue home oxybutynin    Chronic suprapubic catheter      Type 2 diabetes mellitus treated with insulin  Patient's FSGs are controlled on current medication regimen.  Last A1c reviewed-   Lab Results   Component Value Date    HGBA1C 5.4 04/16/2025     Most recent fingerstick glucose reviewed-   Recent Labs   Lab 04/17/25  1527   POCTGLUCOSE 151*     Current correctional scale  Low  Maintain anti-hyperglycemic dose as follows-   Antihyperglycemics (From admission, onward)      None          Hold Oral hypoglycemics while patient is in the hospital.      Hyponatremia  Hyponatremia is likely due to renal insufficiency. The patient's most recent sodium results are listed below.  Recent Labs     04/16/25  1841 04/17/25  0453   * 131*     Plan  -   - Will treat the hyponatremia with Fluid restriction of:  1.5 liter per day  - Monitor sodium Daily.   - Patient hyponatremia is stable    ESRD (end stage renal disease) on dialysis  - HD on MWF. Due tomorrow.   - Nephrology consulted, appreciate recs  - Patient reports her phos binder is VELPHORO now - exact dose unknown, use [facility] substitute TID with meals.   Anemia in ESRD (end-stage renal disease)  Anemia is likely due to chronic disease due to Chronic Kidney Disease. Most recent hemoglobin and hematocrit are listed below.  Recent Labs     04/16/25  0000 04/16/25  1841 04/17/25  0453   HGB 11.2* 11.8* 11.4*   HCT 35.8* 37.4 37.3     Plan  - Monitor serial CBC: Daily  - Report of gross hematuria - that may be resolving already.  - Hold eliquis   - Trend CBC  Unspecified atrial fibrillation  Patient has paroxysmal (<7 days) atrial fibrillation. Patient is currently in sinus rhythm. [id number] Score: 3. The patients heart rate in the last 24  hours is as follows:  Pulse  Min: 66  Max: 66     Antiarrhythmics       Anticoagulants       Plan  - Replete lytes with a goal of K>4, Mg >2  - Patient is not anticoagulated due to report of Hematuria  - Patient's afib is currently controlled        Primary hypertension  Patient's blood pressure range in the last 24 hours was: BP  Min: 119/73  Max: 119/73.The patient's inpatient anti-hypertensive regimen is listed below:  Current Antihypertensives       Plan  - BP is uncontrolled, will adjust as follows: resume home medications    Hypothyroidism  - Resume home levothyroxine 50mcg ac breakfast     VIJAYA (obstructive sleep apnea)  - She reports not using CPAP/BIPAP as an outpatient.     Class 1 obesity due to excess calories with serious comorbidity in adult  Body mass index is 36.87 kg/m². Morbid obesity complicates all aspects of disease management from diagnostic modalities to treatment.       Malignant neoplasm of left breast, estrogen receptor positive  Follows with oncology  Continue anastrazole    Pressure injury of right buttock, stage 3  - Q2 turns  - Order low air loss mattress  - Elevate heels off bet  - Wound care consulted, appreciate recs    Final Active Diagnoses:    Diagnosis Date Noted POA    PRINCIPAL PROBLEM:  Acute cystitis with hematuria [N30.01] 04/17/2025 Yes    Chronic suprapubic catheter [Z93.59] 04/17/2025 Not Applicable    Primary hypertension [I10] 06/12/2024 Yes     Chronic    ESRD (end stage renal disease) on dialysis [N18.6, Z99.2] 02/11/2022 Not Applicable    Pressure injury of right buttock, stage 3 [L89.313] 01/26/2022 Yes    Anemia in ESRD (end-stage renal disease) [N18.6, D63.1] 01/25/2022 Yes    Unspecified atrial fibrillation [I48.91] 11/29/2021 Yes    Type 2 diabetes mellitus treated with insulin [E11.9, Z79.4] 02/07/2020 Not Applicable    Malignant neoplasm of left breast, estrogen receptor positive [C50.912, Z17.0] 08/01/2019 Not Applicable     Chronic    Class 1 obesity due to  excess calories with serious comorbidity in adult [E66.811, E66.09] 01/09/2017 Yes    Neurogenic bladder [N31.9] 12/14/2015 Yes     Chronic    Hyponatremia [E87.1] 09/22/2014 Yes     Chronic    VIJAYA (obstructive sleep apnea) [G47.33] 03/17/2014 Yes     Chronic    Hypothyroidism [E03.9] 12/12/2012 Yes     Chronic      Problems Resolved During this Admission:       Discharged Condition: stable    Disposition: Home or Self Care    Follow Up:   Follow-up Information       Lurdes Navarro MD. Schedule an appointment as soon as possible for a visit in 1 week(s).    Specialty: Internal Medicine  Contact information:  [address] [facility] [address]  [phone]                           Patient Instructions:      Notify your health care provider if you experience any of the following:  temperature >100.4     Notify your health care provider if you experience any of the following:  redness, tenderness, or signs of infection (pain, swelling, redness, odor or green/yellow discharge around incision site)     Activity as tolerated       Significant Diagnostic Studies: Microbiology: Urine Culture    Lab Results   Component Value Date    LABURIN >100,000 cfu/ml Gram-negative Rods (A) 04/16/2025       Pending Diagnostic Studies:       None           Medications:  Reconciled Home Medications:      Medication List        CHANGE how you take these medications      DULoxetine 20 MG capsule  Commonly known as: CYMBALTA  TAKE 2 CAPSULES(40 MG) BY MOUTH DAILY  What changed: See the new instructions.     methocarbamoL 750 MG Tab  Commonly known as: ROBAXIN  TAKE 1 TO 2 TABLETS(750 TO 1500 MG) BY MOUTH THREE TIMES DAILY AS NEEDED FOR MUSCLE SPASMS  What changed:   how much to take  how to take this  when to take this            CONTINUE taking these medications      AIMOVIG AUTOINJECTOR 140 mg/mL autoinjector  Generic drug: erenumab-aooe  140 mg MONTHLY (route: subcutaneous)     amLODIPine 10 MG tablet  Commonly  "known as: NORVASC  TAKE 1 TABLET BY MOUTH EVERY DAY     anastrozole 1 mg Tab  Commonly known as: ARIMIDEX  TAKE 1 TABLET BY MOUTH EVERY DAY     BD INSULIN SYRINGE ULTRA-FINE 0.5 mL 31 gauge x 5/16" Syrg  Generic drug: insulin syringe-needle U-100  USE WITH INSULIN 4 TIMES A DAY     carvediloL 12.5 MG tablet  Commonly known as: COREG  Take 1 tablet (12.5 mg total) by mouth 2 (two) times daily.     diclofenac sodium 1 % Gel  Commonly known as: VOLTAREN  Apply 2 g topically 2 (two) times daily as needed (arthritis).     ELIQUIS 5 mg Tab  Generic drug: apixaban  TAKE 1 TABLET BY MOUTH TWICE A DAY     ergocalciferol 50,000 unit Cap  Commonly known as: ERGOCALCIFEROL  Take 1 capsule (50,000 Units total) by mouth every 7 days.     furosemide 80 MG tablet  Commonly known as: LASIX  Take 1 tablet (80 mg total) by mouth once daily.     HYDROcodone-acetaminophen  mg per tablet  Commonly known as: NORCO  Take 1 tablet by mouth every 6 (six) hours as needed for Pain.     ipratropium 21 mcg (0.03 %) nasal spray  Commonly known as: ATROVENT  USE 2 SPRAYS IN EACH NOSTRIL TWICE DAILY     lactulose 10 gram/15 mL solution  Commonly known as: CHRONULAC  Take 20 g by mouth 2 (two) times daily as needed (constipation).     LANTUS SOLOSTAR U-100 INSULIN 100 unit/mL (3 mL) Inpn pen  Generic drug: insulin glargine U-100 (Lantus)  INJECT 12-15 UNITS UNDER THE SKIN at night     losartan 100 MG tablet  Commonly known as: COZAAR  TAKE 1 TABLET(100 MG) BY MOUTH DAILY     magnesium oxide 400 mg (241.3 mg magnesium) tablet  Commonly known as: MAG-OX  Take 1 tablet (400 mg total) by mouth once daily.     oxybutynin 10 MG 24 hr tablet  Commonly known as: DITROPAN-XL  TAKE 1 TABLET BY MOUTH EVERY DAY     oxyCODONE 5 MG immediate release tablet  Commonly known as: ROXICODONE  Take 1 tablet (5 mg total) by mouth daily as needed (severe pain).     pen needle, diabetic 31 gauge x 5/16" Ndle  Commonly known as: BD ULTRA-FINE SHORT PEN NEEDLE  USE " WITH LANTUS DAILY, e 11.65     sodium bicarbonate 650 MG tablet  Take 1 tablet (650 mg total) by mouth once daily.     sumatriptan 100 MG tablet  Commonly known as: IMITREX  Take 1 tablet (100 mg total) by mouth every 2 (two) hours as needed for Migraine (Limit 2 in 14 hours and 9 in one month).     SYNTHROID 50 mcg tablet  Generic drug: levothyroxine  TAKE 1 TABLET BY MOUTH BEFORE BREAKFAST. ADMINISTER ON AN EMPTY STOMACH AT LEAST 30 MINUTES BEFORE FOOD. IF RECEIVING TUBE FEEDS, HOLD TUBE FEEDS FOR 1 HOUR BEFORE AND AFTER LEVOTHYROXINE ADMINISTRATION.     traZODone 100 MG tablet  Commonly known as: DESYREL  TAKE 1 TABLET BY MOUTH NIGHTLY AS NEEDED FOR INSOMNIA.     VELPHORO 500 mg Chew  Generic drug: sucroferric oxyhydroxide  Take by mouth 3 (three) times daily with meals.              Indwelling Lines/Drains at time of discharge:   Lines/Drains/Airways       Drain  Duration                  Hemodialysis AV Fistula 02/14/24 1034 Left forearm 429 days         Suprapubic Catheter 04/16/25 1853 16 Fr. 1 day                    Time spent on the discharge of patient: 35 minutes         Fariha Abraham MD  Department of Hospital Medicine  [facility] - Internal Medicine Telemetry

## 2025-04-18 NOTE — ASSESSMENT & PLAN NOTE
Patient's blood pressure range in the last 24 hours was: BP  Min: 119/73  Max: 119/73.The patient's inpatient anti-hypertensive regimen is listed below:  Current Antihypertensives       Plan  - BP is uncontrolled, will adjust as follows: resume home medications

## 2025-04-18 NOTE — ASSESSMENT & PLAN NOTE
Body mass index is 36.87 kg/m². Morbid obesity complicates all aspects of disease management from diagnostic modalities to treatment.

## 2025-04-18 NOTE — ASSESSMENT & PLAN NOTE
Patient has paroxysmal (<7 days) atrial fibrillation. Patient is currently in sinus rhythm. [id number] Score: 3. The patients heart rate in the last 24 hours is as follows:  Pulse  Min: 66  Max: 66     Antiarrhythmics       Anticoagulants       Plan  - Replete lytes with a goal of K>4, Mg >2  - Patient is not anticoagulated due to report of Hematuria  - Patient's afib is currently controlled

## 2025-04-21 ENCOUNTER — PATIENT OUTREACH (OUTPATIENT)
Dept: ADMINISTRATIVE | Facility: CLINIC | Age: 75
End: 2025-04-21
Payer: MEDICARE

## 2025-04-21 LAB
BACTERIA UR CULT: ABNORMAL
BACTERIA UR CULT: ABNORMAL

## 2025-04-21 NOTE — PROGRESS NOTES
C3 nurse attempted to contact Cecile Bowen  for a TCC post hospital discharge follow up call. No answer. LVM requesting a callback at [phone].    The patient does not have a scheduled HOSFU appointment. Message sent to PCP's staff to assist with HOSFU appointment scheduling.

## 2025-04-22 NOTE — PROGRESS NOTES
C3 nurse spoke with Cecile Bowen  for a TCC post hospital discharge follow up call. The patient has a scheduled [facility] appointment with Michele Harris MD on 4/29/2025 at 3:30 PM.

## 2025-05-01 RX ORDER — LANOLIN ALCOHOL/MO/W.PET/CERES
400 CREAM (GRAM) TOPICAL DAILY
Qty: 30 TABLET | Refills: 0 | Status: SHIPPED | OUTPATIENT
Start: 2025-05-01

## 2025-05-01 NOTE — TELEPHONE ENCOUNTER
Refill Routing Note   Medication(s) are not appropriate for processing by [facility] for the following reason(s):        Outside of protocol  Non-participating provider    [facility] action(s):  Route               Appointments  past 12m or future 3m with PCP    Date Provider   Last Visit   10/2/2024 Lurdes Navarro MD   Next Visit   Visit date not found Lurdes Navarro MD   ED visits in past 90 days: 0        Note composed:12:07 PM 05/01/2025

## 2025-05-07 DIAGNOSIS — G89.29 CHRONIC NECK PAIN: ICD-10-CM

## 2025-05-07 DIAGNOSIS — M54.2 CHRONIC NECK PAIN: ICD-10-CM

## 2025-05-07 DIAGNOSIS — G89.29 CHRONIC MIDLINE LOW BACK PAIN WITHOUT SCIATICA: ICD-10-CM

## 2025-05-07 DIAGNOSIS — M54.50 CHRONIC MIDLINE LOW BACK PAIN WITHOUT SCIATICA: ICD-10-CM

## 2025-05-07 RX ORDER — HYDROCODONE BITARTRATE AND ACETAMINOPHEN 10; 325 MG/1; MG/1
1 TABLET ORAL EVERY 6 HOURS PRN
Qty: 120 TABLET | Refills: 0 | Status: SHIPPED | OUTPATIENT
Start: 2025-05-07 | End: 2025-06-06

## 2025-05-07 NOTE — TELEPHONE ENCOUNTER
"----- Message from Richelle sent at 5/7/2025 12:08 PM CDT -----  Regarding: refill  Contact: [phone]  .Name Of Caller: Self Contact Preference?:[phone] What is the nature of the call?: Requesting refill for Hydrocodone, but needing a new script sent over  pls call .LIA #[id number]  [place], [place] - [address] [place] [address] [facility]  [place] [id number] [place] [address]Thshc: [phone] Fax: [phone]   Additional Notes:"Thank you for all that you do for our patients"  "

## 2025-05-08 ENCOUNTER — PATIENT MESSAGE (OUTPATIENT)
Dept: PHYSICAL MEDICINE AND REHAB | Facility: CLINIC | Age: 75
End: 2025-05-08
Payer: MEDICARE

## 2025-05-08 ENCOUNTER — PATIENT MESSAGE (OUTPATIENT)
Dept: NEUROLOGY | Facility: CLINIC | Age: 75
End: 2025-05-08
Payer: MEDICARE

## 2025-05-13 DIAGNOSIS — G43.711 INTRACTABLE CHRONIC MIGRAINE WITHOUT AURA AND WITH STATUS MIGRAINOSUS: Primary | ICD-10-CM

## 2025-05-17 ENCOUNTER — HOSPITAL ENCOUNTER (OUTPATIENT)
Facility: HOSPITAL | Age: 75
Discharge: HOME | End: 2025-05-18
Attending: EMERGENCY MEDICINE | Admitting: EMERGENCY MEDICINE
Payer: MEDICARE

## 2025-05-17 DIAGNOSIS — R07.9 CHEST PAIN: ICD-10-CM

## 2025-05-17 DIAGNOSIS — R06.02 SOB (SHORTNESS OF BREATH): ICD-10-CM

## 2025-05-17 DIAGNOSIS — J44.1 COPD EXACERBATION: Primary | ICD-10-CM

## 2025-05-17 LAB
ABSOLUTE EOSINOPHIL (OHS): 0.56 K/UL
ABSOLUTE MONOCYTE (OHS): 0.73 K/UL (ref 0.3–1)
ABSOLUTE NEUTROPHIL COUNT (OHS): 5.3 K/UL (ref 1.8–7.7)
ALBUMIN SERPL BCP-MCNC: 2.7 G/DL (ref 3.5–5.2)
ALP SERPL-CCNC: 146 UNIT/L (ref 40–150)
ALT SERPL W/O P-5'-P-CCNC: 13 UNIT/L (ref 10–44)
ANION GAP (OHS): 9 MMOL/L (ref 8–16)
AST SERPL-CCNC: 17 UNIT/L (ref 11–45)
BASOPHILS # BLD AUTO: 0.04 K/UL
BASOPHILS NFR BLD AUTO: 0.5 %
BILIRUB SERPL-MCNC: 0.3 MG/DL (ref 0.1–1)
BIPAP: 0
BNP SERPL-MCNC: 911 PG/ML (ref 0–99)
BUN SERPL-MCNC: 20 MG/DL (ref 6–30)
BUN SERPL-MCNC: 20 MG/DL (ref 8–23)
CALCIUM SERPL-MCNC: 8.5 MG/DL (ref 8.7–10.5)
CHLORIDE SERPL-SCNC: 95 MMOL/L (ref 95–110)
CHLORIDE SERPL-SCNC: 98 MMOL/L (ref 95–110)
CO2 SERPL-SCNC: 25 MMOL/L (ref 23–29)
CORRECTED TEMPERATURE (PCO2): 59.8 MMHG
CORRECTED TEMPERATURE (PH): 7.31
CORRECTED TEMPERATURE (PO2): 22.7 MMHG
CREAT SERPL-MCNC: 2 MG/DL (ref 0.5–1.4)
CREAT SERPL-MCNC: 2.2 MG/DL (ref 0.5–1.4)
ERYTHROCYTE [DISTWIDTH] IN BLOOD BY AUTOMATED COUNT: 15.1 % (ref 11.5–14.5)
FIO2: 21 %
GFR SERPLBLD CREATININE-BSD FMLA CKD-EPI: 26 ML/MIN/1.73/M2
GLUCOSE SERPL-MCNC: 164 MG/DL (ref 70–110)
GLUCOSE SERPL-MCNC: 175 MG/DL (ref 70–110)
GLUCOSE SERPL-MCNC: 305 MG/DL (ref 70–110)
HCT VFR BLD AUTO: 34.6 % (ref 37–48.5)
HCT VFR BLD CALC: 34 %PCV (ref 36–54)
HGB BLD-MCNC: 10.7 GM/DL (ref 12–16)
HOLD SPECIMEN: NORMAL
IMM GRANULOCYTES # BLD AUTO: 0.03 K/UL (ref 0–0.04)
IMM GRANULOCYTES NFR BLD AUTO: 0.4 % (ref 0–0.5)
INFLUENZA A MOLECULAR (OHS): NEGATIVE
INFLUENZA B MOLECULAR (OHS): NEGATIVE
LYMPHOCYTES # BLD AUTO: 1.42 K/UL (ref 1–4.8)
MCH RBC QN AUTO: 29 PG (ref 27–31)
MCHC RBC AUTO-ENTMCNC: 30.9 G/DL (ref 32–36)
MCV RBC AUTO: 94 FL (ref 82–98)
NUCLEATED RBC (/100WBC) (OHS): 0 /100 WBC
PCO2 BLDA: 59.8 MMHG
PH SMN: 7.31 [PH]
PLATELET # BLD AUTO: 281 K/UL (ref 150–450)
PMV BLD AUTO: 8.8 FL (ref 9.2–12.9)
PO2 BLDA: 22.7 MMHG
POC BASE DEFICIT: 3 MMOL/L
POC HCO3: 25.9 MMOL/L
POC IONIZED CALCIUM: 1.13 MMOL/L (ref 1.06–1.42)
POC PERFORMED BY: NORMAL
POC TCO2 (MEASURED): 27 MMOL/L (ref 23–29)
POC TEMPERATURE: 37 C
POCT GLUCOSE: 230 MG/DL (ref 70–110)
POTASSIUM BLD-SCNC: 4 MMOL/L (ref 3.5–5.1)
POTASSIUM SERPL-SCNC: 4 MMOL/L (ref 3.5–5.1)
PROT SERPL-MCNC: 7.1 GM/DL (ref 6–8.4)
RBC # BLD AUTO: 3.69 M/UL (ref 4–5.4)
RELATIVE EOSINOPHIL (OHS): 6.9 %
RELATIVE LYMPHOCYTE (OHS): 17.6 % (ref 18–48)
RELATIVE MONOCYTE (OHS): 9 % (ref 4–15)
RELATIVE NEUTROPHIL (OHS): 65.6 % (ref 38–73)
SAMPLE: ABNORMAL
SARS-COV-2 RDRP RESP QL NAA+PROBE: NEGATIVE
SODIUM BLD-SCNC: 132 MMOL/L (ref 136–145)
SODIUM SERPL-SCNC: 132 MMOL/L (ref 136–145)
SPECIMEN SOURCE: NORMAL
TROPONIN I SERPL HS-MCNC: 11 NG/L
WBC # BLD AUTO: 8.08 K/UL (ref 3.9–12.7)

## 2025-05-17 PROCEDURE — 80047 BASIC METABLC PNL IONIZED CA: CPT | Mod: HCNC

## 2025-05-17 PROCEDURE — 82962 GLUCOSE BLOOD TEST: CPT | Mod: HCNC

## 2025-05-17 PROCEDURE — 96372 THER/PROPH/DIAG INJ SC/IM: CPT | Performed by: PHYSICIAN ASSISTANT

## 2025-05-17 PROCEDURE — G0378 HOSPITAL OBSERVATION PER HR: HCPCS | Mod: HCNC

## 2025-05-17 PROCEDURE — 25000242 PHARM REV CODE 250 ALT 637 W/ HCPCS: Mod: HCNC | Performed by: PHYSICIAN ASSISTANT

## 2025-05-17 PROCEDURE — 99900035 HC TECH TIME PER 15 MIN (STAT): Mod: HCNC

## 2025-05-17 PROCEDURE — 80053 COMPREHEN METABOLIC PANEL: CPT | Mod: HCNC

## 2025-05-17 PROCEDURE — U0002 COVID-19 LAB TEST NON-CDC: HCPCS | Mod: HCNC

## 2025-05-17 PROCEDURE — 87502 INFLUENZA DNA AMP PROBE: CPT | Mod: HCNC

## 2025-05-17 PROCEDURE — 63600175 PHARM REV CODE 636 W HCPCS: Mod: HCNC | Performed by: PHYSICIAN ASSISTANT

## 2025-05-17 PROCEDURE — 83880 ASSAY OF NATRIURETIC PEPTIDE: CPT | Mod: HCNC

## 2025-05-17 PROCEDURE — 27000221 HC OXYGEN, UP TO 24 HOURS: Mod: HCNC

## 2025-05-17 PROCEDURE — 94761 N-INVAS EAR/PLS OXIMETRY MLT: CPT | Mod: HCNC

## 2025-05-17 PROCEDURE — 84484 ASSAY OF TROPONIN QUANT: CPT | Mod: HCNC

## 2025-05-17 PROCEDURE — 94640 AIRWAY INHALATION TREATMENT: CPT | Mod: HCNC,XB

## 2025-05-17 PROCEDURE — 25000003 PHARM REV CODE 250: Mod: HCNC | Performed by: PHYSICIAN ASSISTANT

## 2025-05-17 PROCEDURE — 25000242 PHARM REV CODE 250 ALT 637 W/ HCPCS: Mod: HCNC

## 2025-05-17 PROCEDURE — 96374 THER/PROPH/DIAG INJ IV PUSH: CPT | Mod: HCNC

## 2025-05-17 PROCEDURE — 93005 ELECTROCARDIOGRAM TRACING: CPT | Mod: HCNC

## 2025-05-17 PROCEDURE — 63600175 PHARM REV CODE 636 W HCPCS: Mod: JZ,TB,HCNC

## 2025-05-17 PROCEDURE — A4216 STERILE WATER/SALINE, 10 ML: HCPCS | Mod: HCNC | Performed by: PHYSICIAN ASSISTANT

## 2025-05-17 PROCEDURE — 93010 ELECTROCARDIOGRAM REPORT: CPT | Mod: HCNC,,, | Performed by: INTERNAL MEDICINE

## 2025-05-17 PROCEDURE — 85025 COMPLETE CBC W/AUTO DIFF WBC: CPT | Mod: HCNC

## 2025-05-17 PROCEDURE — 82803 BLOOD GASES ANY COMBINATION: CPT | Mod: HCNC

## 2025-05-17 PROCEDURE — 99285 EMERGENCY DEPT VISIT HI MDM: CPT | Mod: 25,HCNC

## 2025-05-17 PROCEDURE — 94640 AIRWAY INHALATION TREATMENT: CPT | Mod: HCNC

## 2025-05-17 RX ORDER — TALC
6 POWDER (GRAM) TOPICAL NIGHTLY PRN
Status: DISCONTINUED | OUTPATIENT
Start: 2025-05-17 | End: 2025-05-18 | Stop reason: HOSPADM

## 2025-05-17 RX ORDER — ACETAMINOPHEN 325 MG/1
650 TABLET ORAL EVERY 4 HOURS PRN
Status: DISCONTINUED | OUTPATIENT
Start: 2025-05-17 | End: 2025-05-18 | Stop reason: HOSPADM

## 2025-05-17 RX ORDER — LEVOTHYROXINE SODIUM 50 UG/1
50 TABLET ORAL
Status: DISCONTINUED | OUTPATIENT
Start: 2025-05-18 | End: 2025-05-18 | Stop reason: HOSPADM

## 2025-05-17 RX ORDER — ONDANSETRON 8 MG/1
8 TABLET, ORALLY DISINTEGRATING ORAL EVERY 8 HOURS PRN
Status: DISCONTINUED | OUTPATIENT
Start: 2025-05-17 | End: 2025-05-18 | Stop reason: HOSPADM

## 2025-05-17 RX ORDER — SIMETHICONE 80 MG
1 TABLET,CHEWABLE ORAL 4 TIMES DAILY PRN
Status: DISCONTINUED | OUTPATIENT
Start: 2025-05-17 | End: 2025-05-18 | Stop reason: HOSPADM

## 2025-05-17 RX ORDER — OXYBUTYNIN CHLORIDE 10 MG/1
10 TABLET, EXTENDED RELEASE ORAL DAILY
Status: DISCONTINUED | OUTPATIENT
Start: 2025-05-18 | End: 2025-05-18 | Stop reason: HOSPADM

## 2025-05-17 RX ORDER — HYDROCODONE BITARTRATE AND ACETAMINOPHEN 10; 325 MG/1; MG/1
1 TABLET ORAL EVERY 6 HOURS PRN
Refills: 0 | Status: DISCONTINUED | OUTPATIENT
Start: 2025-05-17 | End: 2025-05-18 | Stop reason: HOSPADM

## 2025-05-17 RX ORDER — ANASTROZOLE 1 MG/1
1 TABLET ORAL DAILY
Status: DISCONTINUED | OUTPATIENT
Start: 2025-05-18 | End: 2025-05-18 | Stop reason: HOSPADM

## 2025-05-17 RX ORDER — TRAZODONE HYDROCHLORIDE 100 MG/1
100 TABLET ORAL NIGHTLY
Status: DISCONTINUED | OUTPATIENT
Start: 2025-05-17 | End: 2025-05-18 | Stop reason: HOSPADM

## 2025-05-17 RX ORDER — SODIUM BICARBONATE 650 MG/1
650 TABLET ORAL DAILY
Status: DISCONTINUED | OUTPATIENT
Start: 2025-05-18 | End: 2025-05-18 | Stop reason: HOSPADM

## 2025-05-17 RX ORDER — CARVEDILOL 12.5 MG/1
12.5 TABLET ORAL 2 TIMES DAILY
Status: DISCONTINUED | OUTPATIENT
Start: 2025-05-17 | End: 2025-05-18 | Stop reason: HOSPADM

## 2025-05-17 RX ORDER — GLUCAGON 1 MG
1 KIT INJECTION
Status: DISCONTINUED | OUTPATIENT
Start: 2025-05-17 | End: 2025-05-18 | Stop reason: HOSPADM

## 2025-05-17 RX ORDER — IBUPROFEN 200 MG
16 TABLET ORAL
Status: DISCONTINUED | OUTPATIENT
Start: 2025-05-17 | End: 2025-05-18 | Stop reason: HOSPADM

## 2025-05-17 RX ORDER — IPRATROPIUM BROMIDE 42 UG/1
2 SPRAY, METERED NASAL 2 TIMES DAILY
Status: DISCONTINUED | OUTPATIENT
Start: 2025-05-17 | End: 2025-05-18 | Stop reason: HOSPADM

## 2025-05-17 RX ORDER — IBUPROFEN 200 MG
24 TABLET ORAL
Status: DISCONTINUED | OUTPATIENT
Start: 2025-05-17 | End: 2025-05-18 | Stop reason: HOSPADM

## 2025-05-17 RX ORDER — SODIUM CHLORIDE 0.9 % (FLUSH) 0.9 %
10 SYRINGE (ML) INJECTION EVERY 8 HOURS
Status: DISCONTINUED | OUTPATIENT
Start: 2025-05-17 | End: 2025-05-18 | Stop reason: HOSPADM

## 2025-05-17 RX ORDER — LOSARTAN POTASSIUM 50 MG/1
100 TABLET ORAL DAILY
Status: DISCONTINUED | OUTPATIENT
Start: 2025-05-18 | End: 2025-05-18 | Stop reason: HOSPADM

## 2025-05-17 RX ORDER — IPRATROPIUM BROMIDE AND ALBUTEROL SULFATE 2.5; .5 MG/3ML; MG/3ML
3 SOLUTION RESPIRATORY (INHALATION) EVERY 6 HOURS PRN
Status: DISCONTINUED | OUTPATIENT
Start: 2025-05-17 | End: 2025-05-17

## 2025-05-17 RX ORDER — ALUMINUM HYDROXIDE, MAGNESIUM HYDROXIDE, AND SIMETHICONE 1200; 120; 1200 MG/30ML; MG/30ML; MG/30ML
30 SUSPENSION ORAL 4 TIMES DAILY PRN
Status: DISCONTINUED | OUTPATIENT
Start: 2025-05-17 | End: 2025-05-18 | Stop reason: HOSPADM

## 2025-05-17 RX ORDER — PREDNISONE 20 MG/1
40 TABLET ORAL DAILY
Status: DISCONTINUED | OUTPATIENT
Start: 2025-05-18 | End: 2025-05-18 | Stop reason: HOSPADM

## 2025-05-17 RX ORDER — IPRATROPIUM BROMIDE AND ALBUTEROL SULFATE 2.5; .5 MG/3ML; MG/3ML
3 SOLUTION RESPIRATORY (INHALATION)
Status: COMPLETED | OUTPATIENT
Start: 2025-05-17 | End: 2025-05-17

## 2025-05-17 RX ORDER — POLYETHYLENE GLYCOL 3350 17 G/17G
17 POWDER, FOR SOLUTION ORAL DAILY PRN
Status: DISCONTINUED | OUTPATIENT
Start: 2025-05-17 | End: 2025-05-18 | Stop reason: HOSPADM

## 2025-05-17 RX ORDER — INSULIN ASPART 100 [IU]/ML
0-5 INJECTION, SOLUTION INTRAVENOUS; SUBCUTANEOUS
Status: DISCONTINUED | OUTPATIENT
Start: 2025-05-17 | End: 2025-05-18 | Stop reason: HOSPADM

## 2025-05-17 RX ORDER — IPRATROPIUM BROMIDE AND ALBUTEROL SULFATE 2.5; .5 MG/3ML; MG/3ML
3 SOLUTION RESPIRATORY (INHALATION)
Status: DISCONTINUED | OUTPATIENT
Start: 2025-05-17 | End: 2025-05-18 | Stop reason: HOSPADM

## 2025-05-17 RX ORDER — DULOXETIN HYDROCHLORIDE 20 MG/1
40 CAPSULE, DELAYED RELEASE ORAL DAILY
Status: DISCONTINUED | OUTPATIENT
Start: 2025-05-18 | End: 2025-05-18 | Stop reason: HOSPADM

## 2025-05-17 RX ORDER — NALOXONE HCL 0.4 MG/ML
0.02 VIAL (ML) INJECTION
Status: DISCONTINUED | OUTPATIENT
Start: 2025-05-17 | End: 2025-05-18 | Stop reason: HOSPADM

## 2025-05-17 RX ORDER — FUROSEMIDE 40 MG/1
80 TABLET ORAL DAILY
Status: DISCONTINUED | OUTPATIENT
Start: 2025-05-18 | End: 2025-05-18 | Stop reason: HOSPADM

## 2025-05-17 RX ORDER — IPRATROPIUM BROMIDE AND ALBUTEROL SULFATE 2.5; .5 MG/3ML; MG/3ML
3 SOLUTION RESPIRATORY (INHALATION)
Status: DISCONTINUED | OUTPATIENT
Start: 2025-05-17 | End: 2025-05-17

## 2025-05-17 RX ORDER — AMLODIPINE BESYLATE 10 MG/1
10 TABLET ORAL DAILY
Status: DISCONTINUED | OUTPATIENT
Start: 2025-05-18 | End: 2025-05-18 | Stop reason: HOSPADM

## 2025-05-17 RX ORDER — ALBUTEROL SULFATE 2.5 MG/.5ML
10 SOLUTION RESPIRATORY (INHALATION) ONCE
Status: COMPLETED | OUTPATIENT
Start: 2025-05-17 | End: 2025-05-17

## 2025-05-17 RX ORDER — METHOCARBAMOL 750 MG/1
1500 TABLET, FILM COATED ORAL 3 TIMES DAILY
Status: DISCONTINUED | OUTPATIENT
Start: 2025-05-17 | End: 2025-05-18 | Stop reason: HOSPADM

## 2025-05-17 RX ORDER — METHYLPREDNISOLONE SOD SUCC 125 MG
125 VIAL (EA) INJECTION
Status: COMPLETED | OUTPATIENT
Start: 2025-05-17 | End: 2025-05-17

## 2025-05-17 RX ORDER — PROCHLORPERAZINE EDISYLATE 5 MG/ML
5 INJECTION INTRAMUSCULAR; INTRAVENOUS EVERY 6 HOURS PRN
Status: DISCONTINUED | OUTPATIENT
Start: 2025-05-17 | End: 2025-05-18 | Stop reason: HOSPADM

## 2025-05-17 RX ADMIN — INSULIN ASPART 2 UNITS: 100 INJECTION, SOLUTION INTRAVENOUS; SUBCUTANEOUS at 11:05

## 2025-05-17 RX ADMIN — IPRATROPIUM BROMIDE AND ALBUTEROL SULFATE 3 ML: 2.5; .5 SOLUTION RESPIRATORY (INHALATION) at 07:05

## 2025-05-17 RX ADMIN — ALBUTEROL SULFATE 10 MG: 2.5 SOLUTION RESPIRATORY (INHALATION) at 02:05

## 2025-05-17 RX ADMIN — Medication 10 ML: at 09:05

## 2025-05-17 RX ADMIN — IPRATROPIUM BROMIDE 2 SPRAY: 42 SPRAY, METERED NASAL at 08:05

## 2025-05-17 RX ADMIN — INSULIN ASPART 2 UNITS: 100 INJECTION, SOLUTION INTRAVENOUS; SUBCUTANEOUS at 05:05

## 2025-05-17 RX ADMIN — IPRATROPIUM BROMIDE AND ALBUTEROL SULFATE 3 ML: 2.5; .5 SOLUTION RESPIRATORY (INHALATION) at 01:05

## 2025-05-17 RX ADMIN — TRAZODONE HYDROCHLORIDE 100 MG: 100 TABLET ORAL at 08:05

## 2025-05-17 RX ADMIN — CARVEDILOL 12.5 MG: 12.5 TABLET, FILM COATED ORAL at 08:05

## 2025-05-17 RX ADMIN — APIXABAN 5 MG: 5 TABLET, FILM COATED ORAL at 08:05

## 2025-05-17 RX ADMIN — METHYLPREDNISOLONE SODIUM SUCCINATE 125 MG: 125 INJECTION, POWDER, FOR SOLUTION INTRAMUSCULAR; INTRAVENOUS at 02:05

## 2025-05-17 RX ADMIN — METHOCARBAMOL 1500 MG: 750 TABLET ORAL at 08:05

## 2025-05-17 RX ADMIN — HYDROCODONE BITARTRATE AND ACETAMINOPHEN 1 TABLET: 10; 325 TABLET ORAL at 07:05

## 2025-05-17 NOTE — ED NOTES
Pt identifiers Cecile Bowen were checked and are correct  LOC: The patient is awake, alert, aware of environment with an appropriate affect. Oriented x4, speaking appropriately  APPEARANCE: Pt rates back pain an 8/10  in no acute distress, pt is clean and well groomed, clothing properly fastened  Suprapubic catheter to  bag patent draining yellow colored urine with white colored sediment   SKIN: Skin warm, dry and intact, normal skin turgor, moist mucus membranes  RESPIRATORY: Airway is open and patent, respirations are spontaneous, even and unlabored, normal effort and rate Crackles auscultated to lower lung fields  O2 at 2L/min per nasal cannula in progress   CARDIAC: Normal rate and rhythm, 1 + peripheral edema noted, capillary refill < 3 seconds, bilateral radial pulses 2+  AV fistula to left upper arm noted with palpable thrill  ABDOMEN: Soft, nontender, nondistended. Bowel sounds present   NEUROLOGIC: PERRL, facial expression is symmetrical, patient moving all extremities spontaneously, normal sensation in all extremities when touched with a finger.  Follows all commands appropriately  MUSCULOSKELETAL: right leg larger than left leg

## 2025-05-17 NOTE — ED NOTES
Received report from STACEY Michel RN- pt transported from ER #[id number]. AAOx4, skin w/d, resp wnl,  SP murillo to gu bag w/ cloudy yellow urine w/ strong odor.  IV site  rt wrist  asymptomatic  , Dialysis access left arm w/ thrill. Pt able to turn side to side independently.  Excoriated skin  noted to both buttocks  and  under both breast . Offers no c/o's at this time. Personal belongings  bagged and labeled.   . Side rails up x2, call bell in reach, bed in low position with brake engaged.   LOC: The patient is awake and alert; oriented x 3 and speaking appropriately.  APPEARANCE: Patient resting comfortably, patient is clean and well groomed  SKIN: warm and dry, normal skin turgor & moist mucus membranes, excoriated skin on both buttocks and under breasts  MUSCULOSKELETAL: Patient moving all extremities well, no obvious swelling or deformities noted, bedridden but urns independently  RESPIRATORY: Airway is open and patent, ; respirations are spontaneous, normal effort and rate, on O2 2l/nc  CARDIAC: Patient has a normal rate, no peripheral edema noted, capillary refill < 3 seconds; No complaints of chest pain   ABDOMEN: Soft and non tender to palpation, no distention noted. SP murillo in place to  bag w/ cloudy yellow urine w/ strong odor

## 2025-05-17 NOTE — ED NOTES
I-STAT Chem-8+ Results:   Value Reference Range   Sodium 132 136-145 mmol/L   Potassium  4.0 3.5-5.1 mmol/L   Chloride 95  mmol/L   Ionized Calcium 1.13 1.06-1.42 mmol/L   CO2 (measured) 27 23-29 mmol/L   Glucose 175  mg/dL   BUN 20 6-30 mg/dL   Creatinine 2.2 0.5-1.4 mg/dL   Hematocrit 34 36-54%

## 2025-05-17 NOTE — Clinical Note
Diagnosis: COPD exacerbation [263320]   Future Attending Provider: JACKSON COTTER [[id number]]   Special Needs:: No Special Needs [1]

## 2025-05-17 NOTE — ED TRIAGE NOTES
Pt had dialysis yesterday  Pt complained of shortness of breath times two days  and was given a duo neb respiratory treatment by EMS  Pt arrived with O2 at 3L/min per nasal cannula by EMS

## 2025-05-17 NOTE — H&P
ED Observation Unit  History and Physical      I assumed care of this patient from the Main ED at onset of observation time, 17:00 on 05/17/2025.       History of Present Illness:    74 year old female with PMHx significant for ESRD on HD MWF, hypertension, hyperlipidemia, ARON, breast CA, chronic indwelling urinary catheter d/t neurogenic bladder who presents for shortness of breath that has worsened over the last day. Associated cough. Pt with inspiratory and expiratory wheezing per EMS, treated with duoneb x1 with mild improvment. Pt with continued wheezing on arrival to the ED. Treated with duoneb x2 and steroids. Labs without leukocytosis, clinicially significant anemia, or electrolyte abnormality. EKG without ST elevation. Trop negative. CXR without acute process. Continued wheezing again on reassessment, will treat with continuous albuterol. Pt with mild hypoxia to 92% on room air. improved to 98% on 2L nasal cannula.     I reviewed the ED Provider Note dated 5/17 prior to my evaluation of this patient.  I reviewed all labs and imaging performed in the Main ED, prior to patient being placed in Observation. Patient was placed in the ED Observation Unit for COPD exacerbation.    PMHx   Past Medical History:   Diagnosis Date    Bacteremia due to Enterococcus 09/28/2021    Catheter-associated urinary tract infection 09/29/2021    Cervicogenic migraine     Chronic pain     CKD (chronic kidney disease) stage 4, GFR 15-29 ml/min     [facility]    CKD (chronic kidney disease) stage 4, GFR 15-29 ml/min     Diabetes mellitus     Long term use of Insulin, Diabetic Neuropathy    Dialysis patient 01/21/2022    Fibromyalgia     Hydronephrosis     Hyperlipidemia     Hypertension 12/12/2012    Hypothyroidism 12/12/2012    ARON (iron deficiency anemia)     Insomnia     Levoscoliosis     Malignant neoplasm of upper-outer quadrant of left breast in female, estrogen receptor positive     Metabolic acidosis     Mobility impaired      Nuclear sclerosis - Both Eyes 03/24/2014    VIJAYA (obstructive sleep apnea)     Osteopenia     Pulmonary nodule     Recurrent UTI     Renal manifestation of secondary diabetes mellitus     Secondary hyperparathyroidism, renal     Urinary retention     Urinary tract infection due to ESBL Klebsiella       Past Surgical History:   Procedure Laterality Date    ANGIOGRAPHY OF UPPER EXTREMITY N/A 9/19/2024    Procedure: Angiogram Extremity Bilateral;  Surgeon: RODRIGO Friedman III, MD;  Location: [facility] OR 2ND FLR;  Service: Vascular;  Laterality: N/A;  R femoral approach, arch & arm angiogram  168.76mgy  33.1145 gycm2  8.9min    AV FISTULA PLACEMENT Left 2/14/2024    Procedure: CREATION, AV FISTULA;  Surgeon: RODRIGO Friedman III, MD;  Location: [facility] OR 2ND FLR;  Service: Vascular;  Laterality: Left;  LUE AVF creation vs AVG insertion    BIOPSY OF URETER Left 2/18/2022    Procedure: BIOPSY, URETER;  Surgeon: Ronel Whittington MD;  Location: [facility] OR 1ST FLR;  Service: Urology;  Laterality: Left;    BREAST BIOPSY Right     benign    CHOLECYSTECTOMY      COLONOSCOPY N/A 1/13/2017    Procedure: COLONOSCOPY;  Surgeon: Morris Wiseman MD;  Location: [facility] ENDO (4TH FLR);  Service: Endoscopy;  Laterality: N/A;  Renal pt Nephrology advised to avoid phosphate preps    COLONOSCOPY N/A 12/7/2023    Procedure: COLONOSCOPY;  Surgeon: Herve Allen MD;  Location: [facility] ENDO (2ND FLR);  Service: Endoscopy;  Laterality: N/A;  HD MWF; labs prior  suprapubic catheter  pt does not ambulate-uses christina lift- will have sling with her  9/7 ref. by Anastasiia Campbell, , PEG, instr. mailed, Eliquis hold approval pending-[facility] to hold Eliquis 2 days per Dr Navarro-GT  1/30-precall complete-MS    CYSTOSCOPY N/A 10/8/2018    Procedure: CYSTOSCOPY;  Surgeon: Ronel Whittington MD;  Location: [facility] OR 1ST FLR;  Service: Urology;  Laterality: N/A;  45 min    CYSTOSCOPY N/A 3/25/2019    Procedure: CYSTOSCOPY;  Surgeon: Ronel FRAZIER  MD Pk;  Location: [facility] OR [facility] FLR;  Service: Urology;  Laterality: N/A;  45 min    CYSTOSCOPY N/A 8/26/2019    Procedure: CYSTOSCOPY;  Surgeon: Ronel Whittington MD;  [facility];  Service: Urology;  Laterality: N/A;  45 min    CYSTOSCOPY N/A 7/2/2021    Procedure: CYSTOSCOPY;  Surgeon: Ronel Whittington MD;  [facility];  Service: Urology;  Laterality: N/A;    CYSTOSCOPY  12/23/2021    Procedure: CYSTOSCOPY;  Surgeon: Ronel Whittington MD;  [facility];  Service: Urology;;    CYSTOSCOPY  2/18/2022    Procedure: CYSTOSCOPY;  Surgeon: Ronel Whittington MD;  [facility];  Service: Urology;;    CYSTOSCOPY W/ URETERAL STENT PLACEMENT Left 5/15/2021    Procedure: CYSTOSCOPY, WITH URETERAL STENT INSERTION;  Surgeon: Levy Sánchez Jr., MD;  Location: [facility] OR [address] [place] [address];  Service: Urology;  Laterality: Left;    CYSTOSCOPY WITH BIOPSY OF BLADDER N/A 1/27/2020    Procedure: CYSTOSCOPY, WITH BLADDER BIOPSY, WITH FULGURATION IF INDICATED;  Surgeon: Ronel Whittington MD;  Location: [facility] OR [address] [place] [address];  Service: Urology;  Laterality: N/A;    DILATION AND CURETTAGE OF UTERUS      FISTULOGRAM N/A 4/29/2024    Procedure: Fistulogram;  Surgeon: RODRIGO Friedman III, MD;  Location: [facility] CATH LAB;  Service: Peripheral Vascular;  Laterality: N/A;    FISTULOGRAM Left 1/6/2025    Procedure: Fistulogram;  Surgeon: RODRIGO Friedman III, MD;  Location: [facility] CATH LAB;  Service: Peripheral Vascular;  Laterality: Left;    GALLBLADDER SURGERY  2006    HYSTERECTOMY      INJECTION FOR SENTINEL NODE IDENTIFICATION Left 8/1/2019    Procedure: INJECTION, FOR SENTINEL NODE IDENTIFICATION;  Surgeon: Samia Fulton MD;  Location: [facility] OR 2ND [facility];  Service: General;  Laterality: Left;    INJECTION OF BOTULINUM TOXIN TYPE A N/A 10/8/2018    Procedure: INJECTION, BOTULINUM TOXIN, TYPE A 300 UNITS;  Surgeon: Ronel Whittington MD;  Location: [facility] OR [address] [place] [address];  Service:  Urology;  Laterality: N/A;    INJECTION OF BOTULINUM TOXIN TYPE A N/A 3/25/2019    Procedure: INJECTION, BOTULINUM TOXIN, TYPE A 300 UNITS;  Surgeon: Ronel Whittington MD;  Location: [facility] OR [address] [place] [address];  Service: Urology;  Laterality: N/A;    INJECTION OF BOTULINUM TOXIN TYPE A N/A 8/26/2019    Procedure: INJECTION, BOTULINUM TOXIN, TYPE A 300 UNITS;  Surgeon: Ronel Whittington MD;  [facility];  Service: Urology;  Laterality: N/A;    MASTECTOMY Left 8/1/2019    Procedure: MASTECTOMY 23 hour stay;  Surgeon: Samia Fulton MD;  Location: [facility] OR 2ND FLR;  Service: General;  Laterality: Left;    MEDIPORT REMOVAL Right 6/24/2022    Procedure: REMOVAL, CATHETER, CENTRAL VENOUS, TUNNELED, WITH PORT;  Surgeon: Isauro Anderson MD;  Location: [facility] CATH LAB;  Service: Radiology;  Laterality: Right;    OVARIAN CYST SURGERY  1985    PERCUTANEOUS TRANSLUMINAL ANGIOPLASTY OF ARTERIOVENOUS FISTULA Left 4/29/2024    Procedure: PTA, AV FISTULA;  Surgeon: RODRIGO Friedman III, MD;  Location: [facility] CATH LAB;  Service: Peripheral Vascular;  Laterality: Left;    REPLACEMENT OF STENT Left 12/23/2021    Procedure: REPLACEMENT, STENT;  Surgeon: Ronel Whittington MD;  [facility];  Service: Urology;  Laterality: Left;    REPLACEMENT OF STENT Left 2/18/2022    Procedure: REPLACEMENT, STENT;  Surgeon: Ronel Whittington MD;  [facility];  Service: Urology;  Laterality: Left;    RETROGRADE PYELOGRAPHY Left 2/18/2022    Procedure: PYELOGRAM, RETROGRADE;  Surgeon: Ronel Whittington MD;  [facility];  Service: Urology;  Laterality: Left;    SENTINEL LYMPH NODE BIOPSY Left 8/1/2019    Procedure: BIOPSY, LYMPH NODE, SENTINEL;  Surgeon: Samia Fulton MD;  Location: [facility] OR 2ND FLR;  Service: General;  Laterality: Left;    spt placement      TONSILLECTOMY, ADENOIDECTOMY      TOTAL ABDOMINAL HYSTERECTOMY W/ BILATERAL SALPINGOOPHORECTOMY  1985    URETEROSCOPY Left  2/18/2022    Procedure: URETEROSCOPY;  Surgeon: Ronel Whittington MD;  Location: [facility] OR [address] [place] [address];  Service: Urology;  Laterality: Left;        Family Hx   Family History   Problem Relation Name Age of Onset    Diabetes Sister Kat     Kidney disease Sister Kat         CKD III    ALS Mother          d.    Cancer Maternal Grandmother          d. colon    Cancer Paternal Grandfather          d. lung    Scoliosis Brother [place]         increased pain    Prostate cancer Brother [place]         cured s/p surgery    Cancer Brother [place]         rare cancer that got into the bones    Diabetes Maternal Aunt      Kidney disease Maternal Aunt      Diabetes Maternal Uncle      Amblyopia Neg Hx      Blindness Neg Hx      Cataracts Neg Hx      Glaucoma Neg Hx      Macular degeneration Neg Hx      Retinal detachment Neg Hx      Strabismus Neg Hx          Social Hx   Social History     Socioeconomic History    Marital status:     Number of children: 0    Highest education level: Master's degree (e.g., MA, MS, Lelo, MEd, MSW, BANDAR)   Occupational History    Occupation: teacher     Comment: retired   Tobacco Use    Smoking status: Never    Smokeless tobacco: Never   Substance and Sexual Activity    Alcohol use: No     Alcohol/week: 0.0 standard drinks of alcohol    Drug use: No    Sexual activity: Not Currently     Birth control/protection: Abstinence   Social History Narrative    Single ()             Brother passed away last year.  Pt lives her her sister. (5/27)     Social Drivers of Health     Financial Resource Strain: Medium Risk (4/17/2025)    Overall Financial Resource Strain (CARDIA)     Difficulty of Paying Living Expenses: Somewhat hard   Food Insecurity: No Food Insecurity (4/17/2025)    Hunger Vital Sign     Worried About Running Out of Food in the Last Year: Never true     Ran Out of Food in the Last Year: Never true   Transportation Needs: No Transportation Needs (4/17/2025)    PRAPARE -  Transportation     Lack of Transportation (Medical): No     Lack of Transportation (Non-Medical): No   Physical Activity: Inactive (4/17/2025)    Exercise Vital Sign     Days of Exercise per Week: 0 days     Minutes of Exercise per Session: 0 min   Stress: No Stress Concern Present (4/17/2025)    [employer] [place] [employer] - Occupational Stress Questionnaire     Feeling of Stress : Only a little   Housing Stability: Low Risk  (4/17/2025)    Housing Stability Vital Sign     Unable to Pay for Housing in the Last Year: No     Number of Times Moved in the Last Year: 0     Homeless in the Last Year: No        Vital Signs   Vitals:    05/17/25 1500 05/17/25 1600 05/17/25 1629 05/17/25 1707   BP:   (!) 145/65 (!) 169/71   BP Location:    Right arm   Patient Position:    Sitting   Pulse: 97 85 92 90   Resp:    16   Temp:   98.8 °F (37.1 °C) 98.9 °F (37.2 °C)   TempSrc:    Oral   SpO2: (!) 92% 100% 100% 100%   Weight:       Height:            Review of Systems  Review of Systems   Constitutional:  Negative for chills, fever and malaise/fatigue.   Respiratory:  Positive for cough, shortness of breath and wheezing. Negative for sputum production.    Cardiovascular:  Negative for chest pain.   Musculoskeletal:  Positive for back pain.   Neurological:  Negative for weakness.       Physical Exam  Physical Exam  Vitals and nursing note reviewed.   Constitutional:       General: She is not in acute distress.     Appearance: She is well-developed. She is obese.      Interventions: Nasal cannula in place.   HENT:      Head: Normocephalic.   Eyes:      Extraocular Movements: Extraocular movements intact.   Cardiovascular:      Rate and Rhythm: Normal rate and regular rhythm.   Pulmonary:      Effort: Pulmonary effort is normal.      Breath sounds: Normal breath sounds. No wheezing.   Abdominal:      General: Bowel sounds are normal.      Palpations: Abdomen is soft.   Musculoskeletal:      Cervical back: Normal range of  motion.   Skin:     General: Skin is warm.   Neurological:      General: No focal deficit present.      Mental Status: She is alert and oriented to person, place, and time.         Medications:   Scheduled Meds:   albuterol-ipratropium  3 mL Nebulization Q6H WAKE    [START ON 5/18/2025] amLODIPine  10 mg Oral Daily    [START ON 5/18/2025] anastrozole  1 mg Oral Daily    apixaban  5 mg Oral BID    carvediloL  12.5 mg Oral BID    [START ON 5/18/2025] DULoxetine  40 mg Oral Daily    [START ON 5/18/2025] furosemide  80 mg Oral Daily    ipratropium  2 spray Each Nostril BID    [START ON 5/18/2025] levothyroxine  50 mcg Oral Before breakfast    [START ON 5/18/2025] losartan  100 mg Oral Daily    methocarbamoL  1,500 mg Oral TID    [START ON 5/18/2025] oxybutynin  10 mg Oral Daily    [START ON 5/18/2025] predniSONE  40 mg Oral Daily    [START ON 5/18/2025] sodium bicarbonate  650 mg Oral Daily    sodium chloride 0.9%  10 mL Intravenous Q8H    traZODone  100 mg Oral QHS     Continuous Infusions:  PRN Meds:.  Current Facility-Administered Medications:     acetaminophen, 650 mg, Oral, Q4H PRN    aluminum-magnesium hydroxide-simethicone, 30 mL, Oral, QID PRN    dextrose 50%, 12.5 g, Intravenous, PRN    dextrose 50%, 25 g, Intravenous, PRN    glucagon (human recombinant), 1 mg, Intramuscular, PRN    glucose, 16 g, Oral, PRN    glucose, 24 g, Oral, PRN    HYDROcodone-acetaminophen, 1 tablet, Oral, Q6H PRN    insulin aspart U-100, 0-5 Units, Subcutaneous, QID (AC + HS) PRN    melatonin, 6 mg, Oral, Nightly PRN    naloxone, 0.02 mg, Intravenous, PRN    ondansetron, 8 mg, Oral, Q8H PRN    polyethylene glycol, 17 g, Oral, Daily PRN    prochlorperazine, 5 mg, Intravenous, Q6H PRN    simethicone, 1 tablet, Oral, QID PRN      Assessment/Plan:  COPD exacerbation  Patient's COPD is with exacerbation noted by continued dyspnea and worsening of baseline hypoxia currently. Continue scheduled inhalers Steroids and Supplemental oxygen and  monitor respiratory status closely.      - AFVSS on 2L NC  - no leukocytosis  - continue duonebs q6h   - s/p solumedrol in ED, prednisone 40 mg qd  - incentive spirometry  - tele  - continue to monitor    Case was discussed with the ED provider, Dr. Walters.

## 2025-05-17 NOTE — ED PROVIDER NOTES
Encounter Date: 5/17/2025       History     Chief Complaint   Patient presents with    Shortness of Breath     Pt comes from home w/ c/o SOB x 2 days and wheezing since last night.      Pt is a 74 year old female with PMHx significant for ESRD on HD MWF,HTN, HLD, ARON, breast CA, chronic indwelling urinary catheter d/t neurogenic bladder who presents for shortness of breath that has worsened over the last day. Associated cough. No fever, chills, chest pain, nausea, vomiting, or abdominal pain. No hx of similar symptoms. Treated with duoneb x1 with EMS with mild improvement. She has used her sister's albuterol inhaler with mild relief     The history is provided by the patient.     Review of patient's allergies indicates:  No Known Allergies  Past Medical History:   Diagnosis Date    Bacteremia due to Enterococcus 09/28/2021    Catheter-associated urinary tract infection 09/29/2021    Cervicogenic migraine     Chronic pain     CKD (chronic kidney disease) stage 4, GFR 15-29 ml/min     [facility]    CKD (chronic kidney disease) stage 4, GFR 15-29 ml/min     Diabetes mellitus     Long term use of Insulin, Diabetic Neuropathy    Dialysis patient 01/21/2022    Fibromyalgia     Hydronephrosis     Hyperlipidemia     Hypertension 12/12/2012    Hypothyroidism 12/12/2012    ARON (iron deficiency anemia)     Insomnia     Levoscoliosis     Malignant neoplasm of upper-outer quadrant of left breast in female, estrogen receptor positive     Metabolic acidosis     Mobility impaired     Nuclear sclerosis - Both Eyes 03/24/2014    VIJAYA (obstructive sleep apnea)     Osteopenia     Pulmonary nodule     Recurrent UTI     Renal manifestation of secondary diabetes mellitus     Secondary hyperparathyroidism, renal     Urinary retention     Urinary tract infection due to ESBL Klebsiella      Past Surgical History:   Procedure Laterality Date    ANGIOGRAPHY OF UPPER EXTREMITY N/A 9/19/2024    Procedure: Angiogram Extremity Bilateral;   Surgeon: RODRIGO Friedman III, MD;  Location: [facility] OR 2ND FLR;  Service: Vascular;  Laterality: N/A;  R femoral approach, arch & arm angiogram  168.76mgy  33.1145 gycm2  8.9min    AV FISTULA PLACEMENT Left 2/14/2024    Procedure: CREATION, AV FISTULA;  Surgeon: RODRIGO Friedman III, MD;  Location: [facility] OR 2ND FLR;  Service: Vascular;  Laterality: Left;  LUE AVF creation vs AVG insertion    BIOPSY OF URETER Left 2/18/2022    Procedure: BIOPSY, URETER;  Surgeon: Ronel Whittington MD;  Location: [facility] OR 1ST FLR;  Service: Urology;  Laterality: Left;    BREAST BIOPSY Right     benign    CHOLECYSTECTOMY      COLONOSCOPY N/A 1/13/2017    Procedure: COLONOSCOPY;  Surgeon: Morris Wiseman MD;  Location: [facility] ENDO (4TH FLR);  Service: Endoscopy;  Laterality: N/A;  Renal pt Nephrology advised to avoid phosphate preps    COLONOSCOPY N/A 12/7/2023    Procedure: COLONOSCOPY;  Surgeon: Herve Allen MD;  Location: [facility] ENDO (2ND FLR);  Service: Endoscopy;  Laterality: N/A;  HD MWF; labs prior  suprapubic catheter  pt does not ambulate-uses christina lift- will have sling with her  9/7 ref. by Anastasiia Campbell DO, RIKKI, instr. mailed, Eliquis hold approval pending-[facility] to hold Eliquis 2 days per Dr Navarro-GT  1/30-precall complete-MS    CYSTOSCOPY N/A 10/8/2018    Procedure: CYSTOSCOPY;  Surgeon: Ronel Whittington MD;  Location: [facility] OR 1ST FLR;  Service: Urology;  Laterality: N/A;  45 min    CYSTOSCOPY N/A 3/25/2019    Procedure: CYSTOSCOPY;  Surgeon: Ronel Whittington MD;  [facility];  Service: Urology;  Laterality: N/A;  45 min    CYSTOSCOPY N/A 8/26/2019    Procedure: CYSTOSCOPY;  Surgeon: Ronel Whittington MD;  Location: [facility] OR 1ST FLR;  Service: Urology;  Laterality: N/A;  45 min    CYSTOSCOPY N/A 7/2/2021    Procedure: CYSTOSCOPY;  Surgeon: Ronel Whittington MD;  Location: [facility] OR [address] [place] [address];  Service: Urology;  Laterality: N/A;    CYSTOSCOPY  12/23/2021    Procedure:  CYSTOSCOPY;  Surgeon: Ronel Whittington MD;  Location: [facility] OR [address] [place] [address];  Service: Urology;;    CYSTOSCOPY  2/18/2022    Procedure: CYSTOSCOPY;  Surgeon: Ronel Whittington MD;  Location: [facility] OR [address] [place] [address];  Service: Urology;;    CYSTOSCOPY W/ URETERAL STENT PLACEMENT Left 5/15/2021    Procedure: CYSTOSCOPY, WITH URETERAL STENT INSERTION;  Surgeon: Levy Sánchez Jr., MD;  Location: [facility] OR [address] [place] [address];  Service: Urology;  Laterality: Left;    CYSTOSCOPY WITH BIOPSY OF BLADDER N/A 1/27/2020    Procedure: CYSTOSCOPY, WITH BLADDER BIOPSY, WITH FULGURATION IF INDICATED;  Surgeon: Ronel Whittington MD;  Location: [facility] OR [address] [place] [address];  Service: Urology;  Laterality: N/A;    DILATION AND CURETTAGE OF UTERUS      FISTULOGRAM N/A 4/29/2024    Procedure: Fistulogram;  Surgeon: RODRIGO Friedman III, MD;  Location: [facility] CATH LAB;  Service: Peripheral Vascular;  Laterality: N/A;    FISTULOGRAM Left 1/6/2025    Procedure: Fistulogram;  Surgeon: RODRIGO Friedman III, MD;  Location: [facility] CATH LAB;  Service: Peripheral Vascular;  Laterality: Left;    GALLBLADDER SURGERY  2006    HYSTERECTOMY      INJECTION FOR SENTINEL NODE IDENTIFICATION Left 8/1/2019    Procedure: INJECTION, FOR SENTINEL NODE IDENTIFICATION;  Surgeon: Samia Fulton MD;  Location: [facility] OR [address] [place] [address];  Service: General;  Laterality: Left;    INJECTION OF BOTULINUM TOXIN TYPE A N/A 10/8/2018    Procedure: INJECTION, BOTULINUM TOXIN, TYPE A 300 UNITS;  Surgeon: Ronel Whittington MD;  Location: [facility] OR [address] [place] [address];  Service: Urology;  Laterality: N/A;    INJECTION OF BOTULINUM TOXIN TYPE A N/A 3/25/2019    Procedure: INJECTION, BOTULINUM TOXIN, TYPE A 300 UNITS;  Surgeon: Ronel Whittington MD;  Location: [facility] OR [address] [place] [address];  Service: Urology;  Laterality: N/A;    INJECTION OF BOTULINUM TOXIN TYPE A N/A 8/26/2019    Procedure: INJECTION, BOTULINUM TOXIN, TYPE A 300 UNITS;  Surgeon: Ronel Whittington MD;  Location: [facility] OR [address] [place] [address];  Service: Urology;   Laterality: N/A;    MASTECTOMY Left 8/1/2019    Procedure: MASTECTOMY 23 hour stay;  Surgeon: Samia Fulton MD;  [facility];  Service: General;  Laterality: Left;    MEDIPORT REMOVAL Right 6/24/2022    Procedure: REMOVAL, CATHETER, CENTRAL VENOUS, TUNNELED, WITH PORT;  Surgeon: Isauro Anderson MD;  Location: [facility] CATH LAB;  Service: Radiology;  Laterality: Right;    OVARIAN CYST SURGERY  1985    PERCUTANEOUS TRANSLUMINAL ANGIOPLASTY OF ARTERIOVENOUS FISTULA Left 4/29/2024    Procedure: PTA, AV FISTULA;  Surgeon: RODRIGO Friedman III, MD;  Location: [facility] CATH LAB;  Service: Peripheral Vascular;  Laterality: Left;    REPLACEMENT OF STENT Left 12/23/2021    Procedure: REPLACEMENT, STENT;  Surgeon: Ronel Whittington MD;  [facility];  Service: Urology;  Laterality: Left;    REPLACEMENT OF STENT Left 2/18/2022    Procedure: REPLACEMENT, STENT;  Surgeon: Ronel Whittington MD;  [facility];  Service: Urology;  Laterality: Left;    RETROGRADE PYELOGRAPHY Left 2/18/2022    Procedure: PYELOGRAM, RETROGRADE;  Surgeon: Ronel Whittington MD;  Location: [facility] OR [address] [place] [address];  Service: Urology;  Laterality: Left;    SENTINEL LYMPH NODE BIOPSY Left 8/1/2019    Procedure: BIOPSY, LYMPH NODE, SENTINEL;  Surgeon: Samia Fulton MD;  [facility];  Service: General;  Laterality: Left;    spt placement      TONSILLECTOMY, ADENOIDECTOMY      TOTAL ABDOMINAL HYSTERECTOMY W/ BILATERAL SALPINGOOPHORECTOMY  1985    URETEROSCOPY Left 2/18/2022    Procedure: URETEROSCOPY;  Surgeon: Ronel Whittington MD;  Location: [facility] OR [address] [place] [address];  Service: Urology;  Laterality: Left;     Family History   Problem Relation Name Age of Onset    Diabetes Sister Kat     Kidney disease Sister Kat         CKD III    ALS Mother          d.    Cancer Maternal Grandmother          d. colon    Cancer Paternal Grandfather          d. lung    Scoliosis Brother [place]         increased pain     Prostate cancer Brother [place]         cured s/p surgery    Cancer Brother [place]         rare cancer that got into the bones    Diabetes Maternal Aunt      Kidney disease Maternal Aunt      Diabetes Maternal Uncle      Amblyopia Neg Hx      Blindness Neg Hx      Cataracts Neg Hx      Glaucoma Neg Hx      Macular degeneration Neg Hx      Retinal detachment Neg Hx      Strabismus Neg Hx       Social History[1]  Review of Systems    Physical Exam     Initial Vitals [05/17/25 1255]   BP Pulse Resp Temp SpO2   (!) 142/54 80 (!) 22 98.8 °F (37.1 °C) 100 %      MAP       --         Physical Exam    Nursing note and vitals reviewed.  Constitutional: She appears well-developed and well-nourished. No distress.   HENT:   Head: Normocephalic and atraumatic.   Eyes: EOM are normal. Pupils are equal, round, and reactive to light.   Neck: Neck supple.   Normal range of motion.  Cardiovascular:  Normal rate, regular rhythm and intact distal pulses.           Pulmonary/Chest: No respiratory distress. She has wheezes.   Abdominal: Abdomen is soft. She exhibits no distension. There is no abdominal tenderness.   Musculoskeletal:         General: No edema. Normal range of motion.      Cervical back: Normal range of motion and neck supple.     Neurological: She is alert and oriented to person, place, and time.   Skin: Skin is warm and dry.         ED Course   Procedures  Labs Reviewed   COMPREHENSIVE METABOLIC PANEL - Abnormal       Result Value    Sodium 132 (*)     Potassium 4.0      Chloride 98      CO2 25      Glucose 164 (*)     BUN 20      Creatinine 2.0 (*)     Calcium 8.5 (*)     Protein Total 7.1      Albumin 2.7 (*)     Bilirubin Total 0.3            AST 17      ALT 13      Anion Gap 9      eGFR 26 (*)    B-TYPE NATRIURETIC PEPTIDE - Abnormal     (*)    CBC WITH DIFFERENTIAL - Abnormal    WBC 8.08      RBC 3.69 (*)     HGB 10.7 (*)     HCT 34.6 (*)     MCV 94      MCH 29.0      MCHC 30.9 (*)     RDW 15.1 (*)      Platelet Count 281      MPV 8.8 (*)     Nucleated RBC 0      Neut % 65.6      Lymph % 17.6 (*)     Mono % 9.0      Eos % 6.9      Basophil % 0.5      Imm Grans % 0.4      Neut # 5.30      Lymph # 1.42      Mono # 0.73      Eos # 0.56 (*)     Baso # 0.04      Imm Grans # 0.03     BASIC METABOLIC PANEL - Abnormal    Sodium 130 (*)     Potassium 4.5      Chloride 98      CO2 23      Glucose 256 (*)     BUN 32 (*)     Creatinine 2.8 (*)     Calcium 8.0 (*)     Anion Gap 9      eGFR 17 (*)    CBC WITH DIFFERENTIAL - Abnormal    WBC 5.49      RBC 3.16 (*)     HGB 9.1 (*)     HCT 29.4 (*)     MCV 93      MCH 28.8      MCHC 31.0 (*)     RDW 14.9 (*)     Platelet Count 266      MPV 9.0 (*)     Nucleated RBC 0      Neut % 86.4 (*)     Lymph % 10.7 (*)     Mono % 2.2 (*)     Eos % 0.0      Basophil % 0.2      Imm Grans % 0.5      Neut # 4.74      Lymph # 0.59 (*)     Mono # 0.12 (*)     Eos # 0.00      Baso # 0.01      Imm Grans # 0.03     POCT GLUCOSE, HAND-HELD DEVICE - Abnormal    POC Glucose 305 (*)    ISTAT PROCEDURE - Abnormal    POC Glucose 175 (*)     POC BUN 20      POC Creatinine 2.2 (*)     POC Sodium 132 (*)     POC Potassium 4.0      POC Chloride 95      POC TCO2 (MEASURED) 27      POC Ionized Calcium 1.13      POC Hematocrit 34 (*)     Sample HUMPHREY     POCT GLUCOSE - Abnormal    POCT Glucose 230 (*)    POCT GLUCOSE - Abnormal    POCT Glucose 305 (*)    POCT GLUCOSE - Abnormal    POCT Glucose 225 (*)    POCT GLUCOSE - Abnormal    POCT Glucose 265 (*)    INFLUENZA A & B BY MOLECULAR - Normal    INFLUENZA A MOLECULAR Negative      INFLUENZA B MOLECULAR  Negative     TROPONIN I HIGH SENSITIVITY - Normal    Troponin High Sensitive 11     SARS-COV-2 RNA AMPLIFICATION, QUAL - Normal    SARS COV-2 Molecular Negative     MAGNESIUM - Normal    Magnesium  2.1     CBC W/ AUTO DIFFERENTIAL    Narrative:     The following orders were created for panel order CBC auto differential.  Procedure                                Abnormality         Status                     ---------                               -----------         ------                     CBC with Differential[[id number]]       Abnormal            Final result                 Please view results for these tests on the individual orders.   EXTRA TUBES    Narrative:     The following orders were created for panel order EXTRA TUBES.  Procedure                               Abnormality         Status                     ---------                               -----------         ------                     Light Green Top Hold[[id number]]                            Final result                 Please view results for these tests on the individual orders.   LIGHT GREEN TOP HOLD    Extra Tube Hold for add-ons.     CBC W/ AUTO DIFFERENTIAL    Narrative:     The following orders were created for panel order CBC with Automated Differential.  Procedure                               Abnormality         Status                     ---------                               -----------         ------                     CBC with Differential[[id number]]       Abnormal            Final result                 Please view results for these tests on the individual orders.        ECG Results              EKG 12-lead (Final result)        Collection Time Result Time QRS Duration OHS QTC Calculation    05/17/25 13:14:10 05/18/25 10:16:16 82 425                     Final result by Interface, Lab In [facility] (05/18/25 10:16:24)                   Narrative:    Test Reason : R06.02,    Vent. Rate :  81 BPM     Atrial Rate :  81 BPM     P-R Int : 186 ms          QRS Dur :  82 ms      QT Int : 366 ms       P-R-T Axes :   6  -5  58 degrees    QTcB Int : 425 ms    Normal sinus rhythm  Minimal voltage criteria for LVH, may be normal variant  Inferior infarct (cited on or before 31-Dec-2024)  Abnormal ECG  When compared with ECG of 31-Dec-2024 09:33,  Borderline criteria for Lateral infarct are no longer  Present  T wave inversion no longer evident in Inferior leads  Confirmed by Bere Caputo ([id number]) on 5/18/2025 10:16:14 AM    Referred By: AAAREFERRAL SELF           Confirmed By: Bere Caputo                                  Imaging Results              X-Ray Chest AP Portable (Final result)  Result time 05/17/25 14:27:47      Final result by Kristopher Mcpherson MD (05/17/25 14:27:47)                   Impression:      No acute abnormality.      Electronically signed by: Kristopher Mcpherson MD  Date:    05/17/2025  Time:    14:27               Narrative:    EXAMINATION:  XR CHEST AP PORTABLE    CLINICAL HISTORY:  shortness of breath;    TECHNIQUE:  Single frontal view of the chest was performed.    COMPARISON:  Prior exams dating back to 2005    FINDINGS:  Unchanged elevation of the right hemidiaphragm compared to the left side.    Lungs are clear.  No focal consolidation.    No effusion or pneumothorax.    Unchanged cardiomediastinal silhouette.    No acute bone abnormality.                                       Medications   albuterol-ipratropium 2.5 mg-0.5 mg/3 mL nebulizer solution 3 mL (3 mLs Nebulization Given 5/17/25 1317)   methylPREDNISolone sodium succinate injection 125 mg (125 mg Intravenous Given 5/17/25 1404)   albuterol sulfate nebulizer solution 10 mg (10 mg Nebulization Given by Other 5/17/25 9907)     Medical Decision Making  Pt presents for shortness of breath. Ddx: covid, flu, pneumonia, ACS, electrolyte abnormality, anemia, CHF, COPD. Pt well appearing and in no acute distress. Afebrile and hemodynamically stable. Pt with inspiratory and expiratory wheezing per EMS, treated with duoneb x1 with mild improvment. Pt with continued wheezing on arrival to the ED. Treated with duoneb x2 and steroids. Labs without leukocytosis, clinicially significant anemia, or electrolyte abnormality. EKG without ST elevation. Trop negative. CXR without acute process. Continued wheezing again on reassessment, will  treat with continuous albuterol. Pt with mild hypoxia to 92% on room air. improved to 98% on 2L nasal cannula. Case discussed with EDOU provider, plan for admission for further management and evaluation of suspected COPD exacerbation     Amount and/or Complexity of Data Reviewed  Labs: ordered.  Radiology: ordered.    Risk  Prescription drug management.                                      Clinical Impression:  Final diagnoses:  [R06.02] SOB (shortness of breath)  [J44.1] COPD exacerbation (Primary)  [R07.9] Chest pain          ED Disposition Condition    Observation                     [1]   Social History  Tobacco Use    Smoking status: Never    Smokeless tobacco: Never   Substance Use Topics    Alcohol use: No     Alcohol/week: 0.0 standard drinks of alcohol    Drug use: No        Mike Jorge Jr., MD  Resident  05/20/25 3425

## 2025-05-18 VITALS
OXYGEN SATURATION: 99 % | TEMPERATURE: 99 F | RESPIRATION RATE: 16 BRPM | HEIGHT: 68 IN | BODY MASS INDEX: 32.74 KG/M2 | WEIGHT: 216 LBS | HEART RATE: 80 BPM | SYSTOLIC BLOOD PRESSURE: 145 MMHG | DIASTOLIC BLOOD PRESSURE: 69 MMHG

## 2025-05-18 LAB
ABSOLUTE EOSINOPHIL (OHS): 0 K/UL
ABSOLUTE MONOCYTE (OHS): 0.12 K/UL (ref 0.3–1)
ABSOLUTE NEUTROPHIL COUNT (OHS): 4.74 K/UL (ref 1.8–7.7)
ANION GAP (OHS): 9 MMOL/L (ref 8–16)
BASOPHILS # BLD AUTO: 0.01 K/UL
BASOPHILS NFR BLD AUTO: 0.2 %
BUN SERPL-MCNC: 32 MG/DL (ref 8–23)
CALCIUM SERPL-MCNC: 8 MG/DL (ref 8.7–10.5)
CHLORIDE SERPL-SCNC: 98 MMOL/L (ref 95–110)
CO2 SERPL-SCNC: 23 MMOL/L (ref 23–29)
CREAT SERPL-MCNC: 2.8 MG/DL (ref 0.5–1.4)
ERYTHROCYTE [DISTWIDTH] IN BLOOD BY AUTOMATED COUNT: 14.9 % (ref 11.5–14.5)
GFR SERPLBLD CREATININE-BSD FMLA CKD-EPI: 17 ML/MIN/1.73/M2
GLUCOSE SERPL-MCNC: 256 MG/DL (ref 70–110)
HCT VFR BLD AUTO: 29.4 % (ref 37–48.5)
HGB BLD-MCNC: 9.1 GM/DL (ref 12–16)
IMM GRANULOCYTES # BLD AUTO: 0.03 K/UL (ref 0–0.04)
IMM GRANULOCYTES NFR BLD AUTO: 0.5 % (ref 0–0.5)
LYMPHOCYTES # BLD AUTO: 0.59 K/UL (ref 1–4.8)
MAGNESIUM SERPL-MCNC: 2.1 MG/DL (ref 1.6–2.6)
MCH RBC QN AUTO: 28.8 PG (ref 27–31)
MCHC RBC AUTO-ENTMCNC: 31 G/DL (ref 32–36)
MCV RBC AUTO: 93 FL (ref 82–98)
NUCLEATED RBC (/100WBC) (OHS): 0 /100 WBC
OHS QRS DURATION: 82 MS
OHS QTC CALCULATION: 425 MS
PLATELET # BLD AUTO: 266 K/UL (ref 150–450)
PMV BLD AUTO: 9 FL (ref 9.2–12.9)
POCT GLUCOSE: 225 MG/DL (ref 70–110)
POCT GLUCOSE: 265 MG/DL (ref 70–110)
POCT GLUCOSE: 305 MG/DL (ref 70–110)
POTASSIUM SERPL-SCNC: 4.5 MMOL/L (ref 3.5–5.1)
RBC # BLD AUTO: 3.16 M/UL (ref 4–5.4)
RELATIVE EOSINOPHIL (OHS): 0 %
RELATIVE LYMPHOCYTE (OHS): 10.7 % (ref 18–48)
RELATIVE MONOCYTE (OHS): 2.2 % (ref 4–15)
RELATIVE NEUTROPHIL (OHS): 86.4 % (ref 38–73)
SODIUM SERPL-SCNC: 130 MMOL/L (ref 136–145)
WBC # BLD AUTO: 5.49 K/UL (ref 3.9–12.7)

## 2025-05-18 PROCEDURE — A4216 STERILE WATER/SALINE, 10 ML: HCPCS | Mod: HCNC | Performed by: PHYSICIAN ASSISTANT

## 2025-05-18 PROCEDURE — 80048 BASIC METABOLIC PNL TOTAL CA: CPT | Mod: HCNC | Performed by: PHYSICIAN ASSISTANT

## 2025-05-18 PROCEDURE — 25000242 PHARM REV CODE 250 ALT 637 W/ HCPCS: Mod: HCNC | Performed by: PHYSICIAN ASSISTANT

## 2025-05-18 PROCEDURE — 85025 COMPLETE CBC W/AUTO DIFF WBC: CPT | Mod: HCNC | Performed by: PHYSICIAN ASSISTANT

## 2025-05-18 PROCEDURE — 94664 DEMO&/EVAL PT USE INHALER: CPT | Mod: HCNC,XB

## 2025-05-18 PROCEDURE — 94761 N-INVAS EAR/PLS OXIMETRY MLT: CPT | Mod: HCNC

## 2025-05-18 PROCEDURE — 99900035 HC TECH TIME PER 15 MIN (STAT): Mod: HCNC

## 2025-05-18 PROCEDURE — G0378 HOSPITAL OBSERVATION PER HR: HCPCS | Mod: HCNC

## 2025-05-18 PROCEDURE — 94799 UNLISTED PULMONARY SVC/PX: CPT | Mod: HCNC

## 2025-05-18 PROCEDURE — 82962 GLUCOSE BLOOD TEST: CPT | Mod: HCNC

## 2025-05-18 PROCEDURE — 63600175 PHARM REV CODE 636 W HCPCS: Mod: HCNC | Performed by: PHYSICIAN ASSISTANT

## 2025-05-18 PROCEDURE — 25000003 PHARM REV CODE 250: Mod: HCNC | Performed by: PHYSICIAN ASSISTANT

## 2025-05-18 PROCEDURE — 94640 AIRWAY INHALATION TREATMENT: CPT | Mod: HCNC,XB

## 2025-05-18 PROCEDURE — 83735 ASSAY OF MAGNESIUM: CPT | Mod: HCNC | Performed by: PHYSICIAN ASSISTANT

## 2025-05-18 RX ORDER — ALBUTEROL SULFATE 90 UG/1
1-2 INHALANT RESPIRATORY (INHALATION) EVERY 6 HOURS PRN
Qty: 8 G | Refills: 1 | Status: SHIPPED | OUTPATIENT
Start: 2025-05-18 | End: 2025-05-18

## 2025-05-18 RX ORDER — ALBUTEROL SULFATE 90 UG/1
1-2 INHALANT RESPIRATORY (INHALATION) EVERY 6 HOURS PRN
Qty: 8.5 G | Refills: 1 | Status: SHIPPED | OUTPATIENT
Start: 2025-05-18

## 2025-05-18 RX ORDER — PREDNISONE 20 MG/1
40 TABLET ORAL DAILY
Qty: 8 TABLET | Refills: 0 | Status: SHIPPED | OUTPATIENT
Start: 2025-05-18 | End: 2025-05-18

## 2025-05-18 RX ORDER — PREDNISONE 20 MG/1
40 TABLET ORAL DAILY
Qty: 8 TABLET | Refills: 0 | Status: SHIPPED | OUTPATIENT
Start: 2025-05-18 | End: 2025-05-22

## 2025-05-18 RX ADMIN — METHOCARBAMOL 1500 MG: 750 TABLET ORAL at 03:05

## 2025-05-18 RX ADMIN — LEVOTHYROXINE SODIUM 50 MCG: 0.05 TABLET ORAL at 06:05

## 2025-05-18 RX ADMIN — AMLODIPINE BESYLATE 10 MG: 10 TABLET ORAL at 09:05

## 2025-05-18 RX ADMIN — INSULIN ASPART 3 UNITS: 100 INJECTION, SOLUTION INTRAVENOUS; SUBCUTANEOUS at 12:05

## 2025-05-18 RX ADMIN — METHOCARBAMOL 1500 MG: 750 TABLET ORAL at 09:05

## 2025-05-18 RX ADMIN — SODIUM BICARBONATE 650 MG TABLET 650 MG: at 09:05

## 2025-05-18 RX ADMIN — LOSARTAN POTASSIUM 100 MG: 50 TABLET, FILM COATED ORAL at 09:05

## 2025-05-18 RX ADMIN — FUROSEMIDE 80 MG: 40 TABLET ORAL at 09:05

## 2025-05-18 RX ADMIN — OXYBUTYNIN CHLORIDE 10 MG: 10 TABLET, EXTENDED RELEASE ORAL at 09:05

## 2025-05-18 RX ADMIN — HYDROCODONE BITARTRATE AND ACETAMINOPHEN 1 TABLET: 10; 325 TABLET ORAL at 03:05

## 2025-05-18 RX ADMIN — CARVEDILOL 12.5 MG: 12.5 TABLET, FILM COATED ORAL at 09:05

## 2025-05-18 RX ADMIN — APIXABAN 5 MG: 5 TABLET, FILM COATED ORAL at 09:05

## 2025-05-18 RX ADMIN — Medication 10 ML: at 06:05

## 2025-05-18 RX ADMIN — HYDROCODONE BITARTRATE AND ACETAMINOPHEN 1 TABLET: 10; 325 TABLET ORAL at 06:05

## 2025-05-18 RX ADMIN — IPRATROPIUM BROMIDE AND ALBUTEROL SULFATE 3 ML: 2.5; .5 SOLUTION RESPIRATORY (INHALATION) at 08:05

## 2025-05-18 RX ADMIN — ANASTROZOLE 1 MG: 1 TABLET, COATED ORAL at 09:05

## 2025-05-18 RX ADMIN — PREDNISONE 40 MG: 20 TABLET ORAL at 09:05

## 2025-05-18 RX ADMIN — DULOXETINE HYDROCHLORIDE 40 MG: 20 CAPSULE, DELAYED RELEASE ORAL at 09:05

## 2025-05-18 NOTE — DISCHARGE SUMMARY
"ED Observation Unit  Discharge Summary        History of Present Illness:    "74 year old female with PMHx significant for ESRD on HD MWF, hypertension, hyperlipidemia, ARON, breast CA, chronic indwelling urinary catheter d/t neurogenic bladder who presents for shortness of breath that has worsened over the last day. Associated cough. Pt with inspiratory and expiratory wheezing per EMS, treated with duoneb x1 with mild improvment. Pt with continued wheezing on arrival to the ED. Treated with duoneb x2 and steroids. Labs without leukocytosis, clinicially significant anemia, or electrolyte abnormality. EKG without ST elevation. Trop negative. CXR without acute process. Continued wheezing again on reassessment, will treat with continuous albuterol. Pt with mild hypoxia to 92% on room air. improved to 98% on 2L nasal cannula. "     Observation Course:    Patient was admitted to the emergency room observation unit for possible COPD exacerbation.  Chest x-ray without infiltrate and viral studies negative.  Patient with significant inspiratory and expiratory wheezing on admission, but did improve with scheduled duo-nebs.  Did require supplemental oxygen briefly, but was weaned off without difficulty.  At the time of discharge she was saturating 100% on room air.  Mild hypercapnia on admission slightly above baseline.  She does have a diagnosis of obstructive sleep apnea.  Patient states she has never been diagnosed with COPD, and denies any extensive smoking history.  No PFTs in her chart.  Ambulatory referral to pulmonology for PFTs possible treatment of COPD.  Patient was discharged home on a 4 day course of p.o. prednisone and albuterol HFA.      Consultants:    None    Final Diagnosis:  COPD exacerbation  Acute respiratory failure with hypoxia and hypercapnia    Discharge Condition: Stable    Disposition: Home or Self Care     Time spent on the discharge of the patient including review of hospital course with the " patient. reviewing discharge medications and arranging follow-up care 35 minutes.  Patient was seen and examined on the date of discharge and determined to be suitable for discharge.    Follow Up:  Future Appointments   Date Time Provider Department Center   5/19/2025 10:45 AM CHAIR 13, [facility] KIDNEY [facility] [address]   5/20/2025 11:15 AM Ronel Whittington MD [facility] UROLOGY [address]   5/21/2025 10:45 AM CHAIR 13, [facility] KIDNEY [facility] [address]   5/23/2025 10:45 AM CHAIR 13, [facility] KIDNEY [facility] [address]   5/26/2025 10:45 AM CHAIR 13, [facility] KIDNEY [facility] [address]   5/28/2025 10:45 AM CHAIR 13, [facility] KIDNEY [facility] [address]   5/30/2025 10:45 AM CHAIR 13, [facility] KIDNEY [facility] [address]   6/2/2025 10:45 AM CHAIR 13, [facility] KIDNEY [facility] [address]   6/4/2025 10:45 AM CHAIR 13, [facility] KIDNEY [facility] [address]   6/6/2025 10:45 AM CHAIR 13, [facility] KIDNEY [facility] [address]   6/9/2025 10:45 AM CHAIR 13, [facility] KIDNEY [facility] [address]   6/11/2025 10:45 AM CHAIR 13, [facility] KIDNEY [facility] [address]   6/13/2025 10:45 AM CHAIR 13, [facility] KIDNEY CARE [facility]ney [address]   6/16/2025 10:45 AM CHAIR 13, [facility] KIDNEY CARE [facility] [address]   6/18/2025 10:45 AM CHAIR 13, [facility] KIDNEY CARE [facility] [address]   6/20/2025 10:45 AM CHAIR 13, [facility] KIDNEY CARE [facility] [address]   6/23/2025 10:45 AM CHAIR 13, [facility] KIDNEY CARE [facility] [address]   6/25/2025 10:45 AM CHAIR 13, [facility] KIDNEY [facility] [address]   6/27/2025 10:45 AM CHAIR 13, [facility] KIDNEY [facility] [address]   6/30/2025 10:45 AM CHAIR 13, [facility] KIDNEY [facility] [address]   7/2/2025 10:45 AM CHAIR 13, [facility] [address]   7/4/2025 10:45 AM CHAIR 13, [facility] [address]   7/7/2025 10:45 AM CHAIR 13, [facility] [address]   7/9/2025 10:45 AM CHAIR 13, [facility] [address]   7/11/2025 10:45 AM CHAIR 13, [facility] [address]   7/14/2025 10:45 AM CHAIR 13, [facility] [address]   7/16/2025 10:45 AM CHAIR 13, [facility] [address]   7/18/2025 10:45 AM CHAIR 13, [facility] [address]   7/21/2025 10:45 AM CHAIR 13, [facility] [address]   7/23/2025 10:45 AM CHAIR 13, [facility] [address]   7/25/2025 10:45 AM CHAIR 13, [facility] [address]   7/28/2025 10:45 AM CHAIR 13, [facility] [address]   7/30/2025 10:45 AM CHAIR 13, [facility] KIDNEY CARE [facility] [address]   8/1/2025 10:45 AM CHAIR 13, [facility] [address]   8/4/2025 10:45 AM CHAIR 13, [facility] [address]   8/6/2025 10:45 AM CHAIR 13, [facility] [address]   8/8/2025 10:45 AM CHAIR 13, [facility] [address]   8/11/2025 10:45 AM CHAIR 13, [facility] [address]   8/13/2025 10:45 AM CHAIR 13, [facility] KIDNEY CARE [facility] [address]   8/15/2025 10:45 AM CHAIR 13, [facility] [address]   8/18/2025 10:45 AM CHAIR 13, [facility] KIDNEY CARE [facility] [address]   8/20/2025 10:45 AM CHAIR 13, [facility] KIDNEY CARE [facility] [address]   8/22/2025 10:45 AM CHAIR 13, [facility] [address]   8/25/2025 10:45 AM CHAIR 13, [facility] KIDNEY CARE [facility] [address]   8/26/2025  9:45 AM VASCULAR, LAB [facility]ASCL[facility] [address]   8/26/2025 10:30 AM RODRIGO Friedman III, MD [facility] VASCSUR [address]   8/27/2025 10:45 AM CHAIR 13, [facility] [address]   8/29/2025 10:45 AM CHAIR 13, [facility] [address]     DENISHA Howell AUGUSTO

## 2025-05-18 NOTE — ED NOTES
Took report from Nallely VELASQUEZ, and assumed care of pt at this time. Pt resting comfortably and independently repositioned in stretcher with bed locked in lowest position for safety. NAD noted at this time. Respirations even and unlabored and visible chest rise noted.  Patient offered bathroom assistance and denies need at this time. Pt instructed to call if assistance is needed. Pt on continuous cardiac monitoring. Call light within reach. No needs at this time. Will continue to monitor.

## 2025-05-18 NOTE — PROGRESS NOTES
"ED Observation Unit  Progress Note      HPI   "74 year old female with PMHx significant for ESRD on HD MWF, hypertension, hyperlipidemia, ARON, breast CA, chronic indwelling urinary catheter d/t neurogenic bladder who presents for shortness of breath that has worsened over the last day. Associated cough. Pt with inspiratory and expiratory wheezing per EMS, treated with duoneb x1 with mild improvment. Pt with continued wheezing on arrival to the ED. Treated with duoneb x2 and steroids. Labs without leukocytosis, clinicially significant anemia, or electrolyte abnormality. EKG without ST elevation. Trop negative. CXR without acute process. Continued wheezing again on reassessment, will treat with continuous albuterol. Pt with mild hypoxia to 92% on room air. improved to 98% on 2L nasal cannula. "    Interval History   Vital signs stable, afebrile.  Patient is saturating 100% on 2 L nasal cannula.  Nasal cannula weaned, patient is currently on room air saturating greater than 95%.  We will observe closely over the next couple of hours.  She states her shortness of breath and cough have improved.  She is complaining of some mild back pain, but did have some relief after hydrocodone.    PMHx   Past Medical History:   Diagnosis Date    Bacteremia due to Enterococcus 09/28/2021    Catheter-associated urinary tract infection 09/29/2021    Cervicogenic migraine     Chronic pain     CKD (chronic kidney disease) stage 4, GFR 15-29 ml/min     [facility]    CKD (chronic kidney disease) stage 4, GFR 15-29 ml/min     Diabetes mellitus     Long term use of Insulin, Diabetic Neuropathy    Dialysis patient 01/21/2022    Fibromyalgia     Hydronephrosis     Hyperlipidemia     Hypertension 12/12/2012    Hypothyroidism 12/12/2012    ARON (iron deficiency anemia)     Insomnia     Levoscoliosis     Malignant neoplasm of upper-outer quadrant of left breast in female, estrogen receptor positive     Metabolic acidosis     Mobility impaired     " CARE TRANSITIONS (HRTIC) NOTE    Attempted to contact patient for final call in Care Transitions Program. Unable to reach. Left a voicemail with instructions to call back if my services are needed. Will close the case at this time. No readmissions in 30 days.   Nuclear sclerosis - Both Eyes 03/24/2014    VIJAYA (obstructive sleep apnea)     Osteopenia     Pulmonary nodule     Recurrent UTI     Renal manifestation of secondary diabetes mellitus     Secondary hyperparathyroidism, renal     Urinary retention     Urinary tract infection due to ESBL Klebsiella       Past Surgical History:   Procedure Laterality Date    ANGIOGRAPHY OF UPPER EXTREMITY N/A 9/19/2024    Procedure: Angiogram Extremity Bilateral;  Surgeon: RODRIGO Friedman III, MD;  Location: [facility] OR 2ND FLR;  Service: Vascular;  Laterality: N/A;  R femoral approach, arch & arm angiogram  168.76mgy  33.1145 gycm2  8.9min    AV FISTULA PLACEMENT Left 2/14/2024    Procedure: CREATION, AV FISTULA;  Surgeon: RODRIGO Friedman III, MD;  Location: [facility] OR 2ND FLR;  Service: Vascular;  Laterality: Left;  LUE AVF creation vs AVG insertion    BIOPSY OF URETER Left 2/18/2022    Procedure: BIOPSY, URETER;  Surgeon: Ronel Whittington MD;  Location: [facility] OR 1ST FLR;  Service: Urology;  Laterality: Left;    BREAST BIOPSY Right     benign    CHOLECYSTECTOMY      COLONOSCOPY N/A 1/13/2017    Procedure: COLONOSCOPY;  Surgeon: Morris Wiseman MD;  Location: [facility] ENDO (4TH FLR);  Service: Endoscopy;  Laterality: N/A;  Renal pt Nephrology advised to avoid phosphate preps    COLONOSCOPY N/A 12/7/2023    Procedure: COLONOSCOPY;  Surgeon: Herve Allen MD;  Location: [facility] ENDO (2ND FLR);  Service: Endoscopy;  Laterality: N/A;  HD MWF; labs prior  suprapubic catheter  pt does not ambulate-uses christina lift- will have sling with her  9/7 ref. by Anastasiia Campbell, , PEG, instr. mailed, Eliquis hold approval pending-[facility] to hold Eliquis 2 days per Dr Navarro-GT  1/30-precall complete-MS    CYSTOSCOPY N/A 10/8/2018    Procedure: CYSTOSCOPY;  Surgeon: Ronel Whittington MD;  Location: [facility] OR 1ST FLR;  Service: Urology;  Laterality: N/A;  45 min    CYSTOSCOPY N/A 3/25/2019    Procedure: CYSTOSCOPY;  Surgeon: Ronel FRAZIER  MD Pk;  Location: [facility] OR [facility] FLR;  Service: Urology;  Laterality: N/A;  45 min    CYSTOSCOPY N/A 8/26/2019    Procedure: CYSTOSCOPY;  Surgeon: Ronel Whittington MD;  [facility];  Service: Urology;  Laterality: N/A;  45 min    CYSTOSCOPY N/A 7/2/2021    Procedure: CYSTOSCOPY;  Surgeon: Ronel Whittington MD;  [facility];  Service: Urology;  Laterality: N/A;    CYSTOSCOPY  12/23/2021    Procedure: CYSTOSCOPY;  Surgeon: Ronel Whittington MD;  [facility];  Service: Urology;;    CYSTOSCOPY  2/18/2022    Procedure: CYSTOSCOPY;  Surgeon: Ronel Whittington MD;  [facility];  Service: Urology;;    CYSTOSCOPY W/ URETERAL STENT PLACEMENT Left 5/15/2021    Procedure: CYSTOSCOPY, WITH URETERAL STENT INSERTION;  Surgeon: Levy Sánchez Jr., MD;  Location: [facility] OR [address] [place] [address];  Service: Urology;  Laterality: Left;    CYSTOSCOPY WITH BIOPSY OF BLADDER N/A 1/27/2020    Procedure: CYSTOSCOPY, WITH BLADDER BIOPSY, WITH FULGURATION IF INDICATED;  Surgeon: Ronel Whittington MD;  Location: [facility] OR [address] [place] [address];  Service: Urology;  Laterality: N/A;    DILATION AND CURETTAGE OF UTERUS      FISTULOGRAM N/A 4/29/2024    Procedure: Fistulogram;  Surgeon: RODRIGO Friedman III, MD;  Location: [facility] CATH LAB;  Service: Peripheral Vascular;  Laterality: N/A;    FISTULOGRAM Left 1/6/2025    Procedure: Fistulogram;  Surgeon: RODRIGO Friedman III, MD;  Location: [facility] CATH LAB;  Service: Peripheral Vascular;  Laterality: Left;    GALLBLADDER SURGERY  2006    HYSTERECTOMY      INJECTION FOR SENTINEL NODE IDENTIFICATION Left 8/1/2019    Procedure: INJECTION, FOR SENTINEL NODE IDENTIFICATION;  Surgeon: Samia Fulton MD;  Location: [facility] OR 2ND [facility];  Service: General;  Laterality: Left;    INJECTION OF BOTULINUM TOXIN TYPE A N/A 10/8/2018    Procedure: INJECTION, BOTULINUM TOXIN, TYPE A 300 UNITS;  Surgeon: Ronel Whittington MD;  Location: [facility] OR [address] [place] [address];  Service:  Urology;  Laterality: N/A;    INJECTION OF BOTULINUM TOXIN TYPE A N/A 3/25/2019    Procedure: INJECTION, BOTULINUM TOXIN, TYPE A 300 UNITS;  Surgeon: Ronel Whittington MD;  Location: [facility] OR [address] [place] [address];  Service: Urology;  Laterality: N/A;    INJECTION OF BOTULINUM TOXIN TYPE A N/A 8/26/2019    Procedure: INJECTION, BOTULINUM TOXIN, TYPE A 300 UNITS;  Surgeon: Ronel Whittington MD;  [facility];  Service: Urology;  Laterality: N/A;    MASTECTOMY Left 8/1/2019    Procedure: MASTECTOMY 23 hour stay;  Surgeon: Samia Fulton MD;  Location: [facility] OR 2ND FLR;  Service: General;  Laterality: Left;    MEDIPORT REMOVAL Right 6/24/2022    Procedure: REMOVAL, CATHETER, CENTRAL VENOUS, TUNNELED, WITH PORT;  Surgeon: Isauro Anderson MD;  Location: [facility] CATH LAB;  Service: Radiology;  Laterality: Right;    OVARIAN CYST SURGERY  1985    PERCUTANEOUS TRANSLUMINAL ANGIOPLASTY OF ARTERIOVENOUS FISTULA Left 4/29/2024    Procedure: PTA, AV FISTULA;  Surgeon: RODRIGO Friedman III, MD;  Location: [facility] CATH LAB;  Service: Peripheral Vascular;  Laterality: Left;    REPLACEMENT OF STENT Left 12/23/2021    Procedure: REPLACEMENT, STENT;  Surgeon: Ronel Whittington MD;  [facility];  Service: Urology;  Laterality: Left;    REPLACEMENT OF STENT Left 2/18/2022    Procedure: REPLACEMENT, STENT;  Surgeon: Ronel Whittington MD;  [facility];  Service: Urology;  Laterality: Left;    RETROGRADE PYELOGRAPHY Left 2/18/2022    Procedure: PYELOGRAM, RETROGRADE;  Surgeon: Ronel Whittington MD;  [facility];  Service: Urology;  Laterality: Left;    SENTINEL LYMPH NODE BIOPSY Left 8/1/2019    Procedure: BIOPSY, LYMPH NODE, SENTINEL;  Surgeon: Samia Fulton MD;  Location: [facility] OR 2ND FLR;  Service: General;  Laterality: Left;    spt placement      TONSILLECTOMY, ADENOIDECTOMY      TOTAL ABDOMINAL HYSTERECTOMY W/ BILATERAL SALPINGOOPHORECTOMY  1985    URETEROSCOPY Left  2/18/2022    Procedure: URETEROSCOPY;  Surgeon: Ronel Whittington MD;  Location: [facility] OR [address] [place] [address];  Service: Urology;  Laterality: Left;        Family Hx   Family History   Problem Relation Name Age of Onset    Diabetes Sister Kat     Kidney disease Sister Kat         CKD III    ALS Mother          d.    Cancer Maternal Grandmother          d. colon    Cancer Paternal Grandfather          d. lung    Scoliosis Brother [place]         increased pain    Prostate cancer Brother [place]         cured s/p surgery    Cancer Brother [place]         rare cancer that got into the bones    Diabetes Maternal Aunt      Kidney disease Maternal Aunt      Diabetes Maternal Uncle      Amblyopia Neg Hx      Blindness Neg Hx      Cataracts Neg Hx      Glaucoma Neg Hx      Macular degeneration Neg Hx      Retinal detachment Neg Hx      Strabismus Neg Hx          Social Hx   Social History     Socioeconomic History    Marital status:     Number of children: 0    Highest education level: Master's degree (e.g., MA, MS, Lelo, MEd, MSW, BANDAR)   Occupational History    Occupation: teacher     Comment: retired   Tobacco Use    Smoking status: Never    Smokeless tobacco: Never   Substance and Sexual Activity    Alcohol use: No     Alcohol/week: 0.0 standard drinks of alcohol    Drug use: No    Sexual activity: Not Currently     Birth control/protection: Abstinence   Social History Narrative    Single ()             Brother passed away last year.  Pt lives her her sister. (5/27)     Social Drivers of Health     Financial Resource Strain: Medium Risk (4/17/2025)    Overall Financial Resource Strain (CARDIA)     Difficulty of Paying Living Expenses: Somewhat hard   Food Insecurity: No Food Insecurity (4/17/2025)    Hunger Vital Sign     Worried About Running Out of Food in the Last Year: Never true     Ran Out of Food in the Last Year: Never true   Transportation Needs: No Transportation Needs (4/17/2025)    PRAPARE -  Transportation     Lack of Transportation (Medical): No     Lack of Transportation (Non-Medical): No   Physical Activity: Inactive (4/17/2025)    Exercise Vital Sign     Days of Exercise per Week: 0 days     Minutes of Exercise per Session: 0 min   Stress: No Stress Concern Present (4/17/2025)    [employer] [place] [employer] - Occupational Stress Questionnaire     Feeling of Stress : Only a little   Housing Stability: Low Risk  (4/17/2025)    Housing Stability Vital Sign     Unable to Pay for Housing in the Last Year: No     Number of Times Moved in the Last Year: 0     Homeless in the Last Year: No        Vital Signs   Vitals:    05/17/25 2008 05/17/25 2336 05/18/25 0403 05/18/25 0639   BP: (!) 158/70 125/72 131/70    BP Location:       Patient Position:       Pulse: 94 79 70    Resp:  18 19 16   Temp:  98.2 °F (36.8 °C) 97.6 °F (36.4 °C)    TempSrc:  Oral Oral    SpO2:  99% 99%    Weight:       Height:            Review of Systems  Review of Systems   Musculoskeletal:  Positive for back pain (Chronic).   All other systems reviewed and are negative.      Brief Physical Exam/Reassessment   Physical Exam  Constitutional:       General: She is not in acute distress.     Appearance: She is not ill-appearing.   HENT:      Head: Normocephalic and atraumatic.   Cardiovascular:      Rate and Rhythm: Normal rate and regular rhythm.   Pulmonary:      Effort: Pulmonary effort is normal.      Breath sounds: Normal breath sounds.   Abdominal:      General: Bowel sounds are normal.      Palpations: Abdomen is soft.   Musculoskeletal:      Right lower leg: Edema present.      Left lower leg: Edema present.   Skin:     General: Skin is warm and dry.   Neurological:      General: No focal deficit present.      Mental Status: She is alert and oriented to person, place, and time.   Psychiatric:         Mood and Affect: Mood normal.         Labs/Imaging   Labs Reviewed   COMPREHENSIVE METABOLIC PANEL - Abnormal        Result Value    Sodium 132 (*)     Potassium 4.0      Chloride 98      CO2 25      Glucose 164 (*)     BUN 20      Creatinine 2.0 (*)     Calcium 8.5 (*)     Protein Total 7.1      Albumin 2.7 (*)     Bilirubin Total 0.3            AST 17      ALT 13      Anion Gap 9      eGFR 26 (*)    B-TYPE NATRIURETIC PEPTIDE - Abnormal     (*)    CBC WITH DIFFERENTIAL - Abnormal    WBC 8.08      RBC 3.69 (*)     HGB 10.7 (*)     HCT 34.6 (*)     MCV 94      MCH 29.0      MCHC 30.9 (*)     RDW 15.1 (*)     Platelet Count 281      MPV 8.8 (*)     Nucleated RBC 0      Neut % 65.6      Lymph % 17.6 (*)     Mono % 9.0      Eos % 6.9      Basophil % 0.5      Imm Grans % 0.4      Neut # 5.30      Lymph # 1.42      Mono # 0.73      Eos # 0.56 (*)     Baso # 0.04      Imm Grans # 0.03     BASIC METABOLIC PANEL - Abnormal    Sodium 130 (*)     Potassium 4.5      Chloride 98      CO2 23      Glucose 256 (*)     BUN 32 (*)     Creatinine 2.8 (*)     Calcium 8.0 (*)     Anion Gap 9      eGFR 17 (*)    CBC WITH DIFFERENTIAL - Abnormal    WBC 5.49      RBC 3.16 (*)     HGB 9.1 (*)     HCT 29.4 (*)     MCV 93      MCH 28.8      MCHC 31.0 (*)     RDW 14.9 (*)     Platelet Count 266      MPV 9.0 (*)     Nucleated RBC 0      Neut % 86.4 (*)     Lymph % 10.7 (*)     Mono % 2.2 (*)     Eos % 0.0      Basophil % 0.2      Imm Grans % 0.5      Neut # 4.74      Lymph # 0.59 (*)     Mono # 0.12 (*)     Eos # 0.00      Baso # 0.01      Imm Grans # 0.03     POCT GLUCOSE, HAND-HELD DEVICE - Abnormal    POC Glucose 305 (*)    ISTAT PROCEDURE - Abnormal    POC Glucose 175 (*)     POC BUN 20      POC Creatinine 2.2 (*)     POC Sodium 132 (*)     POC Potassium 4.0      POC Chloride 95      POC TCO2 (MEASURED) 27      POC Ionized Calcium 1.13      POC Hematocrit 34 (*)     Sample HUMPHREY     POCT GLUCOSE - Abnormal    POCT Glucose 230 (*)    POCT GLUCOSE - Abnormal    POCT Glucose 305 (*)    POCT GLUCOSE - Abnormal    POCT Glucose 225 (*)    INFLUENZA  A & B BY MOLECULAR - Normal    INFLUENZA A MOLECULAR Negative      INFLUENZA B MOLECULAR  Negative     TROPONIN I HIGH SENSITIVITY - Normal    Troponin High Sensitive 11     SARS-COV-2 RNA AMPLIFICATION, QUAL - Normal    SARS COV-2 Molecular Negative     MAGNESIUM - Normal    Magnesium  2.1     CBC W/ AUTO DIFFERENTIAL    Narrative:     The following orders were created for panel order CBC auto differential.  Procedure                               Abnormality         Status                     ---------                               -----------         ------                     CBC with Differential[[id number]]       Abnormal            Final result                 Please view results for these tests on the individual orders.   EXTRA TUBES    Narrative:     The following orders were created for panel order EXTRA TUBES.  Procedure                               Abnormality         Status                     ---------                               -----------         ------                     Light Green Top Hold[[id number]]                            Final result                 Please view results for these tests on the individual orders.   LIGHT GREEN TOP HOLD    Extra Tube Hold for add-ons.     CBC W/ AUTO DIFFERENTIAL    Narrative:     The following orders were created for panel order CBC with Automated Differential.  Procedure                               Abnormality         Status                     ---------                               -----------         ------                     CBC with Differential[[id number]]       Abnormal            Final result                 Please view results for these tests on the individual orders.   POCT GLUCOSE, HAND-HELD DEVICE   POCT GLUCOSE, HAND-HELD DEVICE   POCT GLUCOSE, HAND-HELD DEVICE   ISTAT CHEM8      Imaging Results              X-Ray Chest AP Portable (Final result)  Result time 05/17/25 14:27:47      Final result by Kristopher Mcpherson MD (05/17/25 14:27:47)                    Impression:      No acute abnormality.      Electronically signed by: Kristopher Mcpherson MD  Date:    05/17/2025  Time:    14:27               Narrative:    EXAMINATION:  XR CHEST AP PORTABLE    CLINICAL HISTORY:  shortness of breath;    TECHNIQUE:  Single frontal view of the chest was performed.    COMPARISON:  Prior exams dating back to 2005    FINDINGS:  Unchanged elevation of the right hemidiaphragm compared to the left side.    Lungs are clear.  No focal consolidation.    No effusion or pneumothorax.    Unchanged cardiomediastinal silhouette.    No acute bone abnormality.                                       I reviewed all labs, imaging, EKGs.     Plan   COPD exacerbation  Acute respiratory failure with hypoxia and hypercapnia- resolved    Patient's COPD is with exacerbation noted by continued dyspnea and worsening of baseline hypoxia currently. Continue scheduled inhalers Steroids and Supplemental oxygen and monitor respiratory status closely.       - AFVSS, currently on room air and tolerating  - pCO2 yesterday elevated above baseline at 59.8.  Mentation intact.  - no leukocytosis or signs of infection  - continue duonebs q6h   - s/p solumedrol in ED, prednisone 40 mg qd  - incentive spirometry  - tele  - continue to monitor   - disposition:  Home if able to main maintain O2 saturation of greater than 90%.    I have discussed this case with DENISHA Portillo.

## 2025-05-18 NOTE — DISCHARGE INSTRUCTIONS
You were prescribed prednisone 40 mg.  You will take this once a day for the next 4 days.    You were also prescribed an albuterol inhaler which you can use every 4-6 hours as needed for shortness of breath or wheezing.    A referral to pulmonology was made on your behalf.  They should be contacting you soon to discuss an appointment.

## 2025-05-18 NOTE — ED NOTES
Assumed care of the patient. Report received from RON Gill. Pt on continuous cardiac monitoring. Pt in hospital gown, side rails up X2, bed low and locked, and call light is placed within reach. No family/visitors at bedside at this time. Pt denies any complaints or needs.

## 2025-05-21 ENCOUNTER — PATIENT MESSAGE (OUTPATIENT)
Dept: PHYSICAL MEDICINE AND REHAB | Facility: CLINIC | Age: 75
End: 2025-05-21
Payer: MEDICARE

## 2025-05-22 ENCOUNTER — TELEPHONE (OUTPATIENT)
Dept: PULMONOLOGY | Facility: CLINIC | Age: 75
End: 2025-05-22
Payer: MEDICARE

## 2025-05-22 DIAGNOSIS — J44.9 CHRONIC OBSTRUCTIVE PULMONARY DISEASE, UNSPECIFIED COPD TYPE: Primary | ICD-10-CM

## 2025-05-24 ENCOUNTER — LAB REQUISITION (OUTPATIENT)
Dept: LAB | Facility: HOSPITAL | Age: 75
End: 2025-05-24
Payer: MEDICARE

## 2025-05-24 DIAGNOSIS — N39.0 URINARY TRACT INFECTION, SITE NOT SPECIFIED: ICD-10-CM

## 2025-05-24 LAB
BACTERIA #/AREA URNS AUTO: ABNORMAL /HPF
BILIRUB UR QL STRIP.AUTO: NEGATIVE
CLARITY UR: ABNORMAL
COLOR UR AUTO: ABNORMAL
GLUCOSE UR QL STRIP: ABNORMAL
HGB UR QL STRIP: ABNORMAL
HOLD SPECIMEN: NORMAL
HYALINE CASTS UR QL AUTO: 0 /LPF (ref 0–1)
KETONES UR QL STRIP: NEGATIVE
LEUKOCYTE ESTERASE UR QL STRIP: ABNORMAL
MICROSCOPIC COMMENT: ABNORMAL
NITRITE UR QL STRIP: NEGATIVE
PH UR STRIP: 6 [PH]
PROT UR QL STRIP: ABNORMAL
RBC #/AREA URNS AUTO: >100 /HPF (ref 0–4)
SP GR UR STRIP: 1.01
SQUAMOUS #/AREA URNS AUTO: 28 /HPF
UROBILINOGEN UR STRIP-ACNC: NEGATIVE EU/DL
WBC #/AREA URNS AUTO: >100 /HPF (ref 0–5)
WBC CLUMPS UR QL AUTO: ABNORMAL

## 2025-05-24 PROCEDURE — 81001 URINALYSIS AUTO W/SCOPE: CPT | Mod: HCNC | Performed by: NURSE PRACTITIONER

## 2025-05-24 PROCEDURE — 87086 URINE CULTURE/COLONY COUNT: CPT | Mod: HCNC | Performed by: NURSE PRACTITIONER

## 2025-05-25 LAB — BACTERIA UR CULT: NORMAL

## 2025-05-28 ENCOUNTER — HOSPITAL ENCOUNTER (EMERGENCY)
Facility: HOSPITAL | Age: 75
Discharge: HOME | End: 2025-05-28
Attending: EMERGENCY MEDICINE
Payer: MEDICARE

## 2025-05-28 VITALS
TEMPERATURE: 98 F | HEART RATE: 68 BPM | OXYGEN SATURATION: 97 % | DIASTOLIC BLOOD PRESSURE: 70 MMHG | WEIGHT: 216 LBS | SYSTOLIC BLOOD PRESSURE: 124 MMHG | HEIGHT: 68 IN | RESPIRATION RATE: 18 BRPM | BODY MASS INDEX: 32.74 KG/M2

## 2025-05-28 DIAGNOSIS — M25.561 RIGHT KNEE PAIN: ICD-10-CM

## 2025-05-28 DIAGNOSIS — M79.89 PAIN AND SWELLING OF RIGHT LOWER LEG: ICD-10-CM

## 2025-05-28 DIAGNOSIS — M79.661 PAIN AND SWELLING OF RIGHT LOWER LEG: ICD-10-CM

## 2025-05-28 PROCEDURE — 25000003 PHARM REV CODE 250: Mod: HCNC | Performed by: EMERGENCY MEDICINE

## 2025-05-28 PROCEDURE — 99284 EMERGENCY DEPT VISIT MOD MDM: CPT | Mod: 25

## 2025-05-28 RX ORDER — HYDROCODONE BITARTRATE AND ACETAMINOPHEN 5; 325 MG/1; MG/1
1 TABLET ORAL
Refills: 0 | Status: COMPLETED | OUTPATIENT
Start: 2025-05-28 | End: 2025-05-28

## 2025-05-28 RX ORDER — METHOCARBAMOL 500 MG/1
500 TABLET, FILM COATED ORAL
Status: COMPLETED | OUTPATIENT
Start: 2025-05-28 | End: 2025-05-28

## 2025-05-28 RX ADMIN — METHOCARBAMOL 500 MG: 500 TABLET ORAL at 01:05

## 2025-05-28 RX ADMIN — HYDROCODONE BITARTRATE AND ACETAMINOPHEN 1 TABLET: 5; 325 TABLET ORAL at 11:05

## 2025-05-29 ENCOUNTER — PATIENT OUTREACH (OUTPATIENT)
Facility: OTHER | Age: 75
End: 2025-05-29
Payer: MEDICARE

## 2025-05-29 RX ORDER — LANOLIN ALCOHOL/MO/W.PET/CERES
CREAM (GRAM) TOPICAL
Qty: 30 TABLET | Refills: 0 | Status: SHIPPED | OUTPATIENT
Start: 2025-05-29

## 2025-05-31 ENCOUNTER — PATIENT MESSAGE (OUTPATIENT)
Dept: PHYSICAL MEDICINE AND REHAB | Facility: CLINIC | Age: 75
End: 2025-05-31
Payer: MEDICARE

## 2025-05-31 DIAGNOSIS — M79.7 FIBROMYALGIA: ICD-10-CM

## 2025-05-31 DIAGNOSIS — M54.2 CHRONIC NECK PAIN: ICD-10-CM

## 2025-05-31 DIAGNOSIS — G89.29 CHRONIC MIDLINE LOW BACK PAIN WITHOUT SCIATICA: ICD-10-CM

## 2025-05-31 DIAGNOSIS — M54.50 CHRONIC MIDLINE LOW BACK PAIN WITHOUT SCIATICA: ICD-10-CM

## 2025-05-31 DIAGNOSIS — G89.29 CHRONIC NECK PAIN: ICD-10-CM

## 2025-06-02 RX ORDER — HYDROCODONE BITARTRATE AND ACETAMINOPHEN 10; 325 MG/1; MG/1
1 TABLET ORAL EVERY 6 HOURS PRN
Qty: 120 TABLET | Refills: 0 | Status: SHIPPED | OUTPATIENT
Start: 2025-06-07 | End: 2025-07-07

## 2025-06-02 RX ORDER — OXYCODONE HYDROCHLORIDE 10 MG/1
10 TABLET ORAL DAILY PRN
Qty: 30 TABLET | Refills: 0 | Status: SHIPPED | OUTPATIENT
Start: 2025-06-02

## 2025-06-02 RX ORDER — METHOCARBAMOL 750 MG/1
TABLET, FILM COATED ORAL
Qty: 180 TABLET | Refills: 1 | Status: SHIPPED | OUTPATIENT
Start: 2025-06-02

## 2025-06-03 NOTE — SUBJECTIVE & OBJECTIVE
Telemedicine  This service was provided by Virtual Visit.    Patient was transferred to [facility] Medicine on:  2/17/22     Chief Complaint   Patient presents with    Hypotension     Arrived [facility] dialysis center with c/o hypotension SBP 80s, sent for possible urosepsis, recent UTI diagnosis, did not receive dialysis today, last dialyzed wednesday       The patient location is: [facility]   Admitted 2/11/2022  8:18 AM  Present with the patient at the time of the telemed/virtual assessment: n/a    Interval History / Events Overnight:   The patient is able to provide adequate history. Additional history was obtained from past medical records. No significant events reported by Nursing.   Patient complains of nothing specific. Symptoms have improved since yesterday. Associated symptoms include: fatigue. Symptoms are decreasing in severity.     Lab test(s) reviewed: hyperkalemia and sCr increased    Review of Systems   Constitutional:  Negative for fever.   Respiratory:  Negative for shortness of breath.      Objective:     Vital Signs (Most Recent):  Temp: 98.7 °F (37.1 °C) (02/19/22 1111)  Pulse: 64 (02/19/22 1111)  Resp: 18 (02/19/22 1111)  BP: 131/60 (02/19/22 1111)  SpO2: 99 % (02/19/22 1111) Vital Signs (24h Range):  Temp:  [98.3 °F (36.8 °C)-98.8 °F (37.1 °C)] 98.7 °F (37.1 °C)  Pulse:  [54-70] 64  Resp:  [16-20] 18  SpO2:  [92 %-100 %] 99 %  BP: (127-182)/(60-78) 131/60     Weight: 104.8 kg (231 lb)  Body mass index is 40.92 kg/m².    Intake/Output Summary (Last 24 hours) at 2/19/2022 1405  Last data filed at 2/19/2022 0512  Gross per 24 hour   Intake --   Output 2200 ml   Net -2200 ml        Physical Exam  Constitutional:       General: She is not in acute distress.     Appearance: Normal appearance. She is not diaphoretic.   Eyes:      General: Lids are normal. No scleral icterus.        Right eye: No discharge.         Left eye: No discharge.      Conjunctiva/sclera: Conjunctivae normal.    Cardiovascular:      Rate and Rhythm: Normal rate.   Pulmonary:      Effort: Pulmonary effort is normal. No tachypnea, accessory muscle usage or respiratory distress.   Abdominal:      General: There is no distension.   Skin:     Coloration: Skin is not cyanotic.   Neurological:      Mental Status: She is alert. She is not disoriented.   Psychiatric:         Attention and Perception: Attention normal.         Mood and Affect: Affect normal.         Behavior: Behavior is cooperative.       Significant Labs:     Recent Labs   Lab 09/05/21  2139 12/21/21  0803 01/03/22  0000   HGBA1C 6.6* 5.3 5.4     :   Recent Labs   Lab 02/18/22 2025 02/19/22  0726 02/19/22  1113   POCTGLUCOSE 181* 152* 123*       Recent Labs   Lab 02/13/22  0514 02/14/22  0355   WBC 5.58 5.76   HGB 9.2* 9.2*   HCT 29.7* 30.4*    332       Recent Labs   Lab 02/13/22  0514 02/14/22  0355   GRAN 43.2  2.4 43.5  2.5   LYMPH 33.2  1.9 32.5  1.9   MONO 14.9  0.8 14.1  0.8   EOS 0.4 0.5       Recent Labs   Lab 02/15/22  0818 02/16/22  0358 02/17/22  0333 02/18/22  0231 02/18/22  1812 02/19/22  0316   *   < > 132* 130* 131* 127*   K 4.5   < > 4.6 5.3* 5.3* 5.5*      < > 100 102 104 99   CO2 25   < > 21* 20* 19* 21*   BUN 27*   < > 34* 35* 31* 35*   CREATININE 2.0*   < > 2.1* 2.1* 2.1* 2.2*   *   < > 143* 131* 197* 139*   CALCIUM 9.5   < > 9.1 8.7 8.5* 8.5*   ALBUMIN 2.0*  --  2.0* 1.8*  --  1.9*   MG  --   --  2.0  --   --   --    PHOS 4.4  --  4.5  --   --   --     < > = values in this interval not displayed.       Recent Labs   Lab 11/1952 11/29/21  0413 12/06/21  0758 01/03/22  0000 01/23/22  0332 02/09/22  0000 02/11/22  0849 02/11/22  1304   PROCAL  --   --  0.69*  --   --   --  0.12  --    LACTATE 0.8  --   --   --   --   --  1.8 1.4   FERRITIN  --    < >  --  353* 812* 470*  --   --     < > = values in this interval not displayed.       Results for orders placed or performed in visit on 02/09/22   Vitamin D    Result Value Ref Range    Vit D, 25-Hydroxy 58.8 30.0 - 100.0 ng/mL     SARS-CoV2 (COVID-19) Qualitative PCR (no units)   Date Value   02/15/2022 Not Detected   12/28/2021 Not Detected   12/21/2021 Not Detected   09/05/2021 Not Detected     POC Rapid COVID (no units)   Date Value   02/11/2022 Negative   01/21/2022 Negative   01/20/2022 Negative   11/28/2021 Negative   09/28/2021 Negative   09/05/2021 Negative   05/14/2021 Negative       ECG Results              EKG 12-lead (Final result)  Result time 02/12/22 09:41:30      Final result by Interface, Lab In [facility] (02/12/22 09:41:30)                   Narrative:    Test Reason :     Vent. Rate : 057 BPM     Atrial Rate : 059 BPM     P-R Int : 184 ms          QRS Dur : 080 ms      QT Int : 426 ms       P-R-T Axes : -40 -13 -15 degrees     QTc Int : 415 ms    Sinus bradycardia  Voltage criteria for left ventricular hypertrophy  Lateral infarct (cited on or before 11-FEB-2022)  Abnormal ECG  When compared with ECG of 11-FEB-2022 08:39,  No significant change was found  Confirmed by KEVYN GARSIA MD ([id number]) on 2/12/2022 9:41:21 AM    Referred By: [email or web address]   SELF           Confirmed By:KEVYN GARSIA MD                                     EKG 12-lead (Final result)  Result time 02/12/22 09:39:20      Final result by Interface, Lab In [facility] (02/12/22 09:39:20)                   Narrative:    Test Reason : I95.9,    Vent. Rate : 066 BPM     Atrial Rate : 068 BPM     P-R Int : 000 ms          QRS Dur : 072 ms      QT Int : 402 ms       P-R-T Axes : 000 -15 -08 degrees     QTc Int : 422 ms    Technically poor ECG tracing  Sinus rhythm  Voltage criteria for left ventricular hypertrophy  Lateral infarct ,age undetermined  Abnormal ECG  When compared with ECG of 21-JAN-2022 12:55,  Questionable change in in R wave progression  Lateral infarct is now Present  T wave inversion now evident in Inferior leads  Confirmed by KEVYN GARSIA MD ([id number]) on 2/12/2022 9:39:06  AM    Referred By: [email or web address]   SELF           Confirmed By:KEVYN GARSIA MD                                    Results for orders placed during the hospital encounter of 11/28/21    Echo    Interpretation Summary  · The left ventricle is normal in size with normal systolic function. The estimated ejection fraction is 60%.  · Normal right ventricular size with normal right ventricular systolic function.  · Normal left ventricular diastolic function.  · Mechanically ventilated; cannot use inferior caval vein diameter to estimate central venous pressure.      SURG FL Surgery Fluoro Usage  See OP Notes for results.     IMPRESSION: See OP Notes for results.     This procedure was auto-finalized by: Virtual Radiologist      Labs and Imaging within the last 24 hours listed above were reviewed.       Diet: Diet diabetic [facility]; 1500 Calorie; Renal; Fluid - 800mL  Significant LDAs:   IV Access Type: Dialysis Access  Urinary Catheter Indication if present: Patient Does Not Have Urinary Catheter  Other Lines/Tubes/Drains: SPC    HIGH RISK CONDITION(S):   Patient has an abrupt change in neurologic status: Weakness     Goals of Care:    Previous admission:  1/21/22  Likely prognosis:  Fair  Code Status: Full Code  Comfort Only: No  Hospice: No  Goals at discharge: remain at home, with physician follow-up    Discharge Planning   FLORENTINO: 2/22/2022     Code Status: Full Code   Is the patient medically ready for discharge?: No    Reason for patient still in hospital (select all that apply): Patient trending condition and Pending disposition  Discharge Plan A: Home Health         Attending Attestation (For Attendings USE Only)...

## 2025-06-04 ENCOUNTER — TELEPHONE (OUTPATIENT)
Dept: INTERNAL MEDICINE | Facility: CLINIC | Age: 75
End: 2025-06-04
Payer: MEDICARE

## 2025-06-09 ENCOUNTER — HOSPITAL ENCOUNTER (INPATIENT)
Facility: HOSPITAL | Age: 75
LOS: 2 days | Discharge: HOME | DRG: 689 | End: 2025-06-12
Attending: STUDENT IN AN ORGANIZED HEALTH CARE EDUCATION/TRAINING PROGRAM | Admitting: STUDENT IN AN ORGANIZED HEALTH CARE EDUCATION/TRAINING PROGRAM
Payer: MEDICARE

## 2025-06-09 DIAGNOSIS — R07.9 CHEST PAIN: ICD-10-CM

## 2025-06-09 DIAGNOSIS — N39.0 URINARY TRACT INFECTION WITHOUT HEMATURIA, SITE UNSPECIFIED: ICD-10-CM

## 2025-06-09 DIAGNOSIS — N30.01 ACUTE CYSTITIS WITH HEMATURIA: ICD-10-CM

## 2025-06-09 DIAGNOSIS — Z99.2 ESRD (END STAGE RENAL DISEASE) ON DIALYSIS: Primary | ICD-10-CM

## 2025-06-09 DIAGNOSIS — T83.511A URINARY TRACT INFECTION ASSOCIATED WITH INDWELLING URETHRAL CATHETER, INITIAL ENCOUNTER: ICD-10-CM

## 2025-06-09 DIAGNOSIS — R06.02 SOB (SHORTNESS OF BREATH): ICD-10-CM

## 2025-06-09 DIAGNOSIS — J44.1 COPD EXACERBATION: ICD-10-CM

## 2025-06-09 DIAGNOSIS — N39.0 UTI (URINARY TRACT INFECTION): ICD-10-CM

## 2025-06-09 DIAGNOSIS — N39.0 URINARY TRACT INFECTION ASSOCIATED WITH INDWELLING URETHRAL CATHETER, INITIAL ENCOUNTER: ICD-10-CM

## 2025-06-09 DIAGNOSIS — N18.6 ESRD (END STAGE RENAL DISEASE) ON DIALYSIS: Primary | ICD-10-CM

## 2025-06-09 LAB
ABSOLUTE EOSINOPHIL (OHS): 0.74 K/UL
ABSOLUTE MONOCYTE (OHS): 0.8 K/UL (ref 0.3–1)
ABSOLUTE NEUTROPHIL COUNT (OHS): 4.67 K/UL (ref 1.8–7.7)
ALBUMIN SERPL BCP-MCNC: 3 G/DL (ref 3.5–5.2)
ALP SERPL-CCNC: 153 UNIT/L (ref 40–150)
ALT SERPL W/O P-5'-P-CCNC: 13 UNIT/L (ref 10–44)
ANION GAP (OHS): 9 MMOL/L (ref 8–16)
AST SERPL-CCNC: 29 UNIT/L (ref 11–45)
BACTERIA #/AREA URNS AUTO: ABNORMAL /HPF
BASOPHILS # BLD AUTO: 0.05 K/UL
BASOPHILS NFR BLD AUTO: 0.6 %
BILIRUB SERPL-MCNC: 0.3 MG/DL (ref 0.1–1)
BILIRUB UR QL STRIP.AUTO: NEGATIVE
BNP SERPL-MCNC: 766 PG/ML (ref 0–99)
BUN SERPL-MCNC: 14 MG/DL (ref 8–23)
CALCIUM SERPL-MCNC: 8.4 MG/DL (ref 8.7–10.5)
CHLORIDE SERPL-SCNC: 102 MMOL/L (ref 95–110)
CLARITY UR: ABNORMAL
CO2 SERPL-SCNC: 22 MMOL/L (ref 23–29)
COLOR UR AUTO: ABNORMAL
CREAT SERPL-MCNC: 1.8 MG/DL (ref 0.5–1.4)
ERYTHROCYTE [DISTWIDTH] IN BLOOD BY AUTOMATED COUNT: 15.2 % (ref 11.5–14.5)
GFR SERPLBLD CREATININE-BSD FMLA CKD-EPI: 30 ML/MIN/1.73/M2
GLUCOSE SERPL-MCNC: 120 MG/DL (ref 70–110)
GLUCOSE SERPL-MCNC: 213 MG/DL (ref 70–110)
GLUCOSE UR QL STRIP: NEGATIVE
HCT VFR BLD AUTO: 34 % (ref 37–48.5)
HGB BLD-MCNC: 10.6 GM/DL (ref 12–16)
HGB UR QL STRIP: ABNORMAL
HOLD SPECIMEN: NORMAL
HYALINE CASTS UR QL AUTO: 0 /LPF (ref 0–1)
IMM GRANULOCYTES # BLD AUTO: 0.04 K/UL (ref 0–0.04)
IMM GRANULOCYTES NFR BLD AUTO: 0.5 % (ref 0–0.5)
INFLUENZA A MOLECULAR (OHS): NEGATIVE
INFLUENZA B MOLECULAR (OHS): NEGATIVE
KETONES UR QL STRIP: NEGATIVE
LEUKOCYTE ESTERASE UR QL STRIP: ABNORMAL
LYMPHOCYTES # BLD AUTO: 1.49 K/UL (ref 1–4.8)
MAGNESIUM SERPL-MCNC: 2 MG/DL (ref 1.6–2.6)
MCH RBC QN AUTO: 29 PG (ref 27–31)
MCHC RBC AUTO-ENTMCNC: 31.2 G/DL (ref 32–36)
MCV RBC AUTO: 93 FL (ref 82–98)
MICROSCOPIC COMMENT: ABNORMAL
NITRITE UR QL STRIP: NEGATIVE
NUCLEATED RBC (/100WBC) (OHS): 0 /100 WBC
PH UR STRIP: 8 [PH]
PHOSPHATE SERPL-MCNC: 3.5 MG/DL (ref 2.7–4.5)
PLATELET # BLD AUTO: 206 K/UL (ref 150–450)
PMV BLD AUTO: 8.4 FL (ref 9.2–12.9)
POTASSIUM SERPL-SCNC: 4 MMOL/L (ref 3.5–5.1)
PROT SERPL-MCNC: 7.2 GM/DL (ref 6–8.4)
PROT UR QL STRIP: ABNORMAL
RBC # BLD AUTO: 3.66 M/UL (ref 4–5.4)
RBC #/AREA URNS AUTO: >100 /HPF (ref 0–4)
RELATIVE EOSINOPHIL (OHS): 9.5 %
RELATIVE LYMPHOCYTE (OHS): 19.1 % (ref 18–48)
RELATIVE MONOCYTE (OHS): 10.3 % (ref 4–15)
RELATIVE NEUTROPHIL (OHS): 60 % (ref 38–73)
SARS-COV-2 RDRP RESP QL NAA+PROBE: NEGATIVE
SODIUM SERPL-SCNC: 133 MMOL/L (ref 136–145)
SP GR UR STRIP: 1.02
TROPONIN I SERPL HS-MCNC: 14 NG/L
UROBILINOGEN UR STRIP-ACNC: NEGATIVE EU/DL
WBC # BLD AUTO: 7.79 K/UL (ref 3.9–12.7)
WBC #/AREA URNS AUTO: >100 /HPF (ref 0–5)
WBC CLUMPS UR QL AUTO: ABNORMAL
YEAST UR QL AUTO: ABNORMAL /HPF

## 2025-06-09 PROCEDURE — 25000242 PHARM REV CODE 250 ALT 637 W/ HCPCS: Performed by: STUDENT IN AN ORGANIZED HEALTH CARE EDUCATION/TRAINING PROGRAM

## 2025-06-09 PROCEDURE — 25000242 PHARM REV CODE 250 ALT 637 W/ HCPCS: Performed by: NURSE PRACTITIONER

## 2025-06-09 PROCEDURE — 84100 ASSAY OF PHOSPHORUS: CPT | Performed by: NURSE PRACTITIONER

## 2025-06-09 PROCEDURE — 94761 N-INVAS EAR/PLS OXIMETRY MLT: CPT

## 2025-06-09 PROCEDURE — 27000221 HC OXYGEN, UP TO 24 HOURS

## 2025-06-09 PROCEDURE — U0002 COVID-19 LAB TEST NON-CDC: HCPCS | Performed by: STUDENT IN AN ORGANIZED HEALTH CARE EDUCATION/TRAINING PROGRAM

## 2025-06-09 PROCEDURE — 63600175 PHARM REV CODE 636 W HCPCS: Performed by: STUDENT IN AN ORGANIZED HEALTH CARE EDUCATION/TRAINING PROGRAM

## 2025-06-09 PROCEDURE — 94640 AIRWAY INHALATION TREATMENT: CPT

## 2025-06-09 PROCEDURE — 93010 ELECTROCARDIOGRAM REPORT: CPT | Mod: ,,, | Performed by: INTERNAL MEDICINE

## 2025-06-09 PROCEDURE — G0378 HOSPITAL OBSERVATION PER HR: HCPCS

## 2025-06-09 PROCEDURE — 25000003 PHARM REV CODE 250: Performed by: STUDENT IN AN ORGANIZED HEALTH CARE EDUCATION/TRAINING PROGRAM

## 2025-06-09 PROCEDURE — 93005 ELECTROCARDIOGRAM TRACING: CPT

## 2025-06-09 PROCEDURE — 87502 INFLUENZA DNA AMP PROBE: CPT | Performed by: STUDENT IN AN ORGANIZED HEALTH CARE EDUCATION/TRAINING PROGRAM

## 2025-06-09 PROCEDURE — 81001 URINALYSIS AUTO W/SCOPE: CPT | Performed by: STUDENT IN AN ORGANIZED HEALTH CARE EDUCATION/TRAINING PROGRAM

## 2025-06-09 PROCEDURE — 99285 EMERGENCY DEPT VISIT HI MDM: CPT | Mod: 25,HCNC

## 2025-06-09 PROCEDURE — 85025 COMPLETE CBC W/AUTO DIFF WBC: CPT | Performed by: STUDENT IN AN ORGANIZED HEALTH CARE EDUCATION/TRAINING PROGRAM

## 2025-06-09 PROCEDURE — 82962 GLUCOSE BLOOD TEST: CPT | Mod: HCNC

## 2025-06-09 PROCEDURE — 80053 COMPREHEN METABOLIC PANEL: CPT | Performed by: STUDENT IN AN ORGANIZED HEALTH CARE EDUCATION/TRAINING PROGRAM

## 2025-06-09 PROCEDURE — 84484 ASSAY OF TROPONIN QUANT: CPT | Performed by: STUDENT IN AN ORGANIZED HEALTH CARE EDUCATION/TRAINING PROGRAM

## 2025-06-09 PROCEDURE — 96372 THER/PROPH/DIAG INJ SC/IM: CPT | Performed by: NURSE PRACTITIONER

## 2025-06-09 PROCEDURE — 83880 ASSAY OF NATRIURETIC PEPTIDE: CPT | Performed by: STUDENT IN AN ORGANIZED HEALTH CARE EDUCATION/TRAINING PROGRAM

## 2025-06-09 PROCEDURE — 25000003 PHARM REV CODE 250: Performed by: NURSE PRACTITIONER

## 2025-06-09 PROCEDURE — 99900035 HC TECH TIME PER 15 MIN (STAT)

## 2025-06-09 PROCEDURE — 83735 ASSAY OF MAGNESIUM: CPT | Performed by: NURSE PRACTITIONER

## 2025-06-09 PROCEDURE — 63600175 PHARM REV CODE 636 W HCPCS: Performed by: NURSE PRACTITIONER

## 2025-06-09 PROCEDURE — 87086 URINE CULTURE/COLONY COUNT: CPT | Performed by: STUDENT IN AN ORGANIZED HEALTH CARE EDUCATION/TRAINING PROGRAM

## 2025-06-09 PROCEDURE — 94640 AIRWAY INHALATION TREATMENT: CPT | Mod: XB

## 2025-06-09 RX ORDER — TRAZODONE HYDROCHLORIDE 100 MG/1
100 TABLET ORAL NIGHTLY
Status: DISCONTINUED | OUTPATIENT
Start: 2025-06-09 | End: 2025-06-12 | Stop reason: HOSPADM

## 2025-06-09 RX ORDER — IPRATROPIUM BROMIDE AND ALBUTEROL SULFATE 2.5; .5 MG/3ML; MG/3ML
9 SOLUTION RESPIRATORY (INHALATION) ONCE
Status: COMPLETED | OUTPATIENT
Start: 2025-06-09 | End: 2025-06-09

## 2025-06-09 RX ORDER — INSULIN ASPART 100 [IU]/ML
0-5 INJECTION, SOLUTION INTRAVENOUS; SUBCUTANEOUS
Status: DISCONTINUED | OUTPATIENT
Start: 2025-06-09 | End: 2025-06-12 | Stop reason: HOSPADM

## 2025-06-09 RX ORDER — CEPHALEXIN 500 MG/1
500 CAPSULE ORAL
Status: COMPLETED | OUTPATIENT
Start: 2025-06-09 | End: 2025-06-09

## 2025-06-09 RX ORDER — IPRATROPIUM BROMIDE AND ALBUTEROL SULFATE 2.5; .5 MG/3ML; MG/3ML
3 SOLUTION RESPIRATORY (INHALATION)
Status: DISCONTINUED | OUTPATIENT
Start: 2025-06-09 | End: 2025-06-09

## 2025-06-09 RX ORDER — HYDROCODONE BITARTRATE AND ACETAMINOPHEN 10; 325 MG/1; MG/1
1 TABLET ORAL EVERY 6 HOURS PRN
Refills: 0 | Status: DISCONTINUED | OUTPATIENT
Start: 2025-06-09 | End: 2025-06-12 | Stop reason: HOSPADM

## 2025-06-09 RX ORDER — AMLODIPINE BESYLATE 10 MG/1
10 TABLET ORAL DAILY
Status: DISCONTINUED | OUTPATIENT
Start: 2025-06-10 | End: 2025-06-12 | Stop reason: HOSPADM

## 2025-06-09 RX ORDER — INSULIN GLARGINE 100 [IU]/ML
5 INJECTION, SOLUTION SUBCUTANEOUS NIGHTLY
Status: DISCONTINUED | OUTPATIENT
Start: 2025-06-09 | End: 2025-06-11

## 2025-06-09 RX ORDER — METHOCARBAMOL 750 MG/1
750 TABLET, FILM COATED ORAL 3 TIMES DAILY PRN
Status: DISCONTINUED | OUTPATIENT
Start: 2025-06-09 | End: 2025-06-12 | Stop reason: HOSPADM

## 2025-06-09 RX ORDER — ANASTROZOLE 1 MG/1
1 TABLET ORAL DAILY
Status: DISCONTINUED | OUTPATIENT
Start: 2025-06-10 | End: 2025-06-12 | Stop reason: HOSPADM

## 2025-06-09 RX ORDER — CARVEDILOL 12.5 MG/1
12.5 TABLET ORAL 2 TIMES DAILY
Status: DISCONTINUED | OUTPATIENT
Start: 2025-06-09 | End: 2025-06-12 | Stop reason: HOSPADM

## 2025-06-09 RX ORDER — LOSARTAN POTASSIUM 50 MG/1
100 TABLET ORAL DAILY
Status: DISCONTINUED | OUTPATIENT
Start: 2025-06-10 | End: 2025-06-12 | Stop reason: HOSPADM

## 2025-06-09 RX ORDER — OXYCODONE HYDROCHLORIDE 10 MG/1
10 TABLET ORAL DAILY PRN
Refills: 0 | Status: DISCONTINUED | OUTPATIENT
Start: 2025-06-09 | End: 2025-06-12 | Stop reason: HOSPADM

## 2025-06-09 RX ORDER — SODIUM CHLORIDE 0.9 % (FLUSH) 0.9 %
10 SYRINGE (ML) INJECTION EVERY 12 HOURS PRN
Status: DISCONTINUED | OUTPATIENT
Start: 2025-06-09 | End: 2025-06-12 | Stop reason: HOSPADM

## 2025-06-09 RX ORDER — OXYBUTYNIN CHLORIDE 10 MG/1
10 TABLET, EXTENDED RELEASE ORAL DAILY
Status: DISCONTINUED | OUTPATIENT
Start: 2025-06-10 | End: 2025-06-12 | Stop reason: HOSPADM

## 2025-06-09 RX ORDER — PREDNISONE 20 MG/1
40 TABLET ORAL
Status: COMPLETED | OUTPATIENT
Start: 2025-06-09 | End: 2025-06-09

## 2025-06-09 RX ORDER — NALOXONE HCL 0.4 MG/ML
0.02 VIAL (ML) INJECTION
Status: DISCONTINUED | OUTPATIENT
Start: 2025-06-09 | End: 2025-06-12 | Stop reason: HOSPADM

## 2025-06-09 RX ORDER — IPRATROPIUM BROMIDE AND ALBUTEROL SULFATE 2.5; .5 MG/3ML; MG/3ML
3 SOLUTION RESPIRATORY (INHALATION)
Status: DISCONTINUED | OUTPATIENT
Start: 2025-06-09 | End: 2025-06-12 | Stop reason: HOSPADM

## 2025-06-09 RX ORDER — ACETAMINOPHEN 325 MG/1
650 TABLET ORAL EVERY 6 HOURS PRN
Status: DISCONTINUED | OUTPATIENT
Start: 2025-06-09 | End: 2025-06-12 | Stop reason: HOSPADM

## 2025-06-09 RX ORDER — GLUCAGON 1 MG
1 KIT INJECTION
Status: DISCONTINUED | OUTPATIENT
Start: 2025-06-09 | End: 2025-06-12 | Stop reason: HOSPADM

## 2025-06-09 RX ORDER — IBUPROFEN 200 MG
16 TABLET ORAL
Status: DISCONTINUED | OUTPATIENT
Start: 2025-06-09 | End: 2025-06-12 | Stop reason: HOSPADM

## 2025-06-09 RX ORDER — DULOXETIN HYDROCHLORIDE 20 MG/1
40 CAPSULE, DELAYED RELEASE ORAL DAILY
Status: DISCONTINUED | OUTPATIENT
Start: 2025-06-10 | End: 2025-06-12 | Stop reason: HOSPADM

## 2025-06-09 RX ORDER — IBUPROFEN 200 MG
24 TABLET ORAL
Status: DISCONTINUED | OUTPATIENT
Start: 2025-06-09 | End: 2025-06-12 | Stop reason: HOSPADM

## 2025-06-09 RX ORDER — ONDANSETRON HYDROCHLORIDE 2 MG/ML
4 INJECTION, SOLUTION INTRAVENOUS EVERY 8 HOURS PRN
Status: DISCONTINUED | OUTPATIENT
Start: 2025-06-09 | End: 2025-06-12 | Stop reason: HOSPADM

## 2025-06-09 RX ORDER — FUROSEMIDE 80 MG/1
80 TABLET ORAL DAILY
Status: DISCONTINUED | OUTPATIENT
Start: 2025-06-10 | End: 2025-06-12 | Stop reason: HOSPADM

## 2025-06-09 RX ORDER — LEVOTHYROXINE SODIUM 50 UG/1
50 TABLET ORAL
Status: DISCONTINUED | OUTPATIENT
Start: 2025-06-10 | End: 2025-06-12 | Stop reason: HOSPADM

## 2025-06-09 RX ADMIN — IPRATROPIUM BROMIDE AND ALBUTEROL SULFATE 9 ML: .5; 3 SOLUTION RESPIRATORY (INHALATION) at 06:06

## 2025-06-09 RX ADMIN — TRAZODONE HYDROCHLORIDE 100 MG: 50 TABLET ORAL at 09:06

## 2025-06-09 RX ADMIN — IPRATROPIUM BROMIDE AND ALBUTEROL SULFATE 3 ML: 2.5; .5 SOLUTION RESPIRATORY (INHALATION) at 09:06

## 2025-06-09 RX ADMIN — PREDNISONE 40 MG: 20 TABLET ORAL at 07:06

## 2025-06-09 RX ADMIN — INSULIN GLARGINE 5 UNITS: 100 INJECTION, SOLUTION SUBCUTANEOUS at 09:06

## 2025-06-09 RX ADMIN — CEPHALEXIN 500 MG: 500 CAPSULE ORAL at 08:06

## 2025-06-09 RX ADMIN — APIXABAN 5 MG: 5 TABLET, FILM COATED ORAL at 09:06

## 2025-06-09 RX ADMIN — CARVEDILOL 12.5 MG: 12.5 TABLET, FILM COATED ORAL at 09:06

## 2025-06-09 RX ADMIN — INSULIN ASPART 1 UNITS: 100 INJECTION, SOLUTION INTRAVENOUS; SUBCUTANEOUS at 09:06

## 2025-06-09 NOTE — ED NOTES
Patient comes into the emergency department by EMS with complaints of hematuria. Patient states that started this morning, pt has suprapubic catheter. Had full dialysis session today. Denies any pain to site and bladder discomfort.    LOC: The patient is awake, alert and aware of environment with an appropriate affect, the patient is oriented x 3 and speaking appropriately.   APPEARANCE: Patient appears comfortable and in no acute distress, patient is clean and well groomed.  SKIN: The skin is warm and dry, color consistent with ethnicity, patient has normal skin turgor and moist mucus membranes, redness to bottom and groin. Fistula to LUE.  MUSCULOSKELETAL: Patient moving all extremities spontaneously, no swelling noted. Completely bedbound.  RESPIRATORY: Airway is open and patent, respirations are spontaneous, patient has a normal effort and rate, no accessory muscle. On 2L NC.  CARDIAC: Pt placed on cardiac monitor. Patient has a normal rate and regular rhythm, no edema noted, capillary refill < 3 seconds.   GASTRO: Soft and non tender to palpation, no distention noted.  : Pt denies any pain or frequency with urination. Suprapubic catheter in place with hematuria.  NEURO: Pt opens eyes spontaneously, behavior appropriate to situation, follows commands, facial expression symmetrical, bilateral hand grasp equal and even, purposeful motor response noted, normal sensation in all extremities when touched with a finger.

## 2025-06-09 NOTE — ED PROVIDER NOTES
Chief Complaint   Hematuria (Blood in urine starting today. Pt also endorses some wheezes. Pt has been taking PRN prescribed albuterol at home. Denies other c/o. Sent from dialysis clinic. Was able to complete dialysis today. )      History Of Present Illness   Cecile Bowen is a 74 y.o. female with a PMHx including  end-stage renal disease on hemodialysis, hypertension, hyperlipidemia, DM presenting with hematuria and SOB.  Patient went to her dialysis appointment today and had a 4 hour session completed.  They advised her to come to the ED afterwards due to concern that her shortness of breath and was not secondary to fluid overload as she is under her usual weight. Also advised to come to ED due to hematuria. Patient reports right a week of increasing shortness of breath, and cough productive of clear sputum.  She reports no fevers, chills, congestion, rhinorrhea, chest pain, lightheadedness, dizziness, or loss of consciousness.  Patient states that she has upcoming follow up with a pulmonologist but has not been formally diagnosed with COPD yet.  States that she has not albuterol rescue inhaler at home that she has been using with minimal relief.  Patient states that she had dialysis today and is under her usual dry weight.  Patient additionally notes that her urine has looked dark and bloody for the past day.  She reports no dysuria and states that she urinates about once a day which is her baseline.  She otherwise reports no abdominal pain, flank pain, fevers, or chills. Patient does have suprapubic catheter and states that this is last exchanged about 2-3 weeks ago.    Independent Historian: Yes  Other Historian or Collateral: Chart review  Interpretor: No      Review of patient's allergies indicates:  No Known Allergies    Medications Ordered Prior to Encounter[1]    Past History   As per HPI and below:  Past Medical History:   Diagnosis Date    Bacteremia due to Enterococcus 09/28/2021     Catheter-associated urinary tract infection 09/29/2021    Cervicogenic migraine     Chronic pain     CKD (chronic kidney disease) stage 4, GFR 15-29 ml/min     [facility]    CKD (chronic kidney disease) stage 4, GFR 15-29 ml/min     Diabetes mellitus     Long term use of Insulin, Diabetic Neuropathy    Dialysis patient 01/21/2022    Fibromyalgia     Hydronephrosis     Hyperlipidemia     Hypertension 12/12/2012    Hypothyroidism 12/12/2012    ARON (iron deficiency anemia)     Insomnia     Levoscoliosis     Malignant neoplasm of upper-outer quadrant of left breast in female, estrogen receptor positive     Metabolic acidosis     Mobility impaired     Nuclear sclerosis - Both Eyes 03/24/2014    VIJAYA (obstructive sleep apnea)     Osteopenia     Pulmonary nodule     Recurrent UTI     Renal manifestation of secondary diabetes mellitus     Secondary hyperparathyroidism, renal     Urinary retention     Urinary tract infection due to ESBL Klebsiella      Past Surgical History:   Procedure Laterality Date    ANGIOGRAPHY OF UPPER EXTREMITY N/A 9/19/2024    Procedure: Angiogram Extremity Bilateral;  Surgeon: RODRIGO Friedman III, MD;  Location: [facility] OR 2ND FLR;  Service: Vascular;  Laterality: N/A;  R femoral approach, arch & arm angiogram  168.76mgy  33.1145 gycm2  8.9min    AV FISTULA PLACEMENT Left 2/14/2024    Procedure: CREATION, AV FISTULA;  Surgeon: RODRIGO Friedman III, MD;  Location: [facility] OR 2ND FLR;  Service: Vascular;  Laterality: Left;  LUE AVF creation vs AVG insertion    BIOPSY OF URETER Left 2/18/2022    Procedure: BIOPSY, URETER;  Surgeon: Ronel Whittington MD;  Location: [facility] OR 1ST FLR;  Service: Urology;  Laterality: Left;    BREAST BIOPSY Right     benign    CHOLECYSTECTOMY      COLONOSCOPY N/A 1/13/2017    Procedure: COLONOSCOPY;  Surgeon: Morris Wiseman MD;  Location: [facility] ENDO (4TH FLR);  Service: Endoscopy;  Laterality: N/A;  Renal pt Nephrology advised to avoid phosphate preps    COLONOSCOPY N/A  12/7/2023    Procedure: COLONOSCOPY;  Surgeon: Herve Allen MD;  Location: [facility] ENDO (2ND FLR);  Service: Endoscopy;  Laterality: N/A;  HD MWF; labs prior  suprapubic catheter  pt does not ambulate-uses christina lift- will have sling with her  9/7 ref. by Anastasiia Campbell, , PEG, instr. mailed, Eliquis hold approval pending-[facility] to hold Eliquis 2 days per Dr Navarro-GT  1/30-precall complete-MS    CYSTOSCOPY N/A 10/8/2018    Procedure: CYSTOSCOPY;  Surgeon: Ronel Whittington MD;  Location: [facility] OR 1ST FLR;  Service: Urology;  Laterality: N/A;  45 min    CYSTOSCOPY N/A 3/25/2019    Procedure: CYSTOSCOPY;  Surgeon: Ronel Whittington MD;  Location: [facility] OR 1ST FLR;  Service: Urology;  Laterality: N/A;  45 min    CYSTOSCOPY N/A 8/26/2019    Procedure: CYSTOSCOPY;  Surgeon: Ronel Whittington MD;  Location: [facility] OR 1ST FLR;  Service: Urology;  Laterality: N/A;  45 min    CYSTOSCOPY N/A 7/2/2021    Procedure: CYSTOSCOPY;  Surgeon: Ronel Whittington MD;  Location: [facility] OR 1ST FLR;  Service: Urology;  Laterality: N/A;    CYSTOSCOPY  12/23/2021    Procedure: CYSTOSCOPY;  Surgeon: Ronel Whittington MD;  Location: [facility] OR 1ST FLR;  Service: Urology;;    CYSTOSCOPY  2/18/2022    Procedure: CYSTOSCOPY;  Surgeon: Ronel Whittington MD;  Location: [facility] OR [facility] FLR;  Service: Urology;;    CYSTOSCOPY W/ URETERAL STENT PLACEMENT Left 5/15/2021    Procedure: CYSTOSCOPY, WITH URETERAL STENT INSERTION;  Surgeon: Levy Sánchez Jr., MD;  Location: [facility] OR 1ST FLR;  Service: Urology;  Laterality: Left;    CYSTOSCOPY WITH BIOPSY OF BLADDER N/A 1/27/2020    Procedure: CYSTOSCOPY, WITH BLADDER BIOPSY, WITH FULGURATION IF INDICATED;  Surgeon: Ronel Whittington MD;  Location: [facility] OR [address] [place] [address];  Service: Urology;  Laterality: N/A;    DILATION AND CURETTAGE OF UTERUS      FISTULOGRAM N/A 4/29/2024    Procedure: Fistulogram;  Surgeon: RODRIGO Friedman III, MD;  Location: [facility] CATH LAB;  Service:  Peripheral Vascular;  Laterality: N/A;    FISTULOGRAM Left 1/6/2025    Procedure: Fistulogram;  Surgeon: RODRIGO Friedman III, MD;  Location: [facility] CATH LAB;  Service: Peripheral Vascular;  Laterality: Left;    GALLBLADDER SURGERY  2006    HYSTERECTOMY      INJECTION FOR SENTINEL NODE IDENTIFICATION Left 8/1/2019    Procedure: INJECTION, FOR SENTINEL NODE IDENTIFICATION;  Surgeon: Samia Fulton MD;  Location: [facility] OR [address] [place] [address];  Service: General;  Laterality: Left;    INJECTION OF BOTULINUM TOXIN TYPE A N/A 10/8/2018    Procedure: INJECTION, BOTULINUM TOXIN, TYPE A 300 UNITS;  Surgeon: Ronel Whittington MD;  Location: [facility] OR [address] [place] [address];  Service: Urology;  Laterality: N/A;    INJECTION OF BOTULINUM TOXIN TYPE A N/A 3/25/2019    Procedure: INJECTION, BOTULINUM TOXIN, TYPE A 300 UNITS;  Surgeon: Ronel Whittington MD;  Location: [facility] OR [address] [place] [address];  Service: Urology;  Laterality: N/A;    INJECTION OF BOTULINUM TOXIN TYPE A N/A 8/26/2019    Procedure: INJECTION, BOTULINUM TOXIN, TYPE A 300 UNITS;  Surgeon: Ronel Whittington MD;  Location: [facility] OR [address] [place] [address];  Service: Urology;  Laterality: N/A;    MASTECTOMY Left 8/1/2019    Procedure: MASTECTOMY 23 hour stay;  Surgeon: Samia Fulton MD;  Location: [facility] OR [address] [place] [address];  Service: General;  Laterality: Left;    MEDIPORT REMOVAL Right 6/24/2022    Procedure: REMOVAL, CATHETER, CENTRAL VENOUS, TUNNELED, WITH PORT;  Surgeon: Isauro Anderson MD;  Location: [facility] CATH LAB;  Service: Radiology;  Laterality: Right;    OVARIAN CYST SURGERY  1985    PERCUTANEOUS TRANSLUMINAL ANGIOPLASTY OF ARTERIOVENOUS FISTULA Left 4/29/2024    Procedure: PTA, AV FISTULA;  Surgeon: RODRIGO Friedman III, MD;  Location: [facility] CATH LAB;  Service: Peripheral Vascular;  Laterality: Left;    REPLACEMENT OF STENT Left 12/23/2021    Procedure: REPLACEMENT, STENT;  Surgeon: Ronel Whittington MD;  Location: [address];  Service: Urology;  Laterality: Left;    REPLACEMENT OF STENT Left  2/18/2022    Procedure: REPLACEMENT, STENT;  Surgeon: Ronel Whittington MD;  Location: [facility] OR 1ST FLR;  Service: Urology;  Laterality: Left;    RETROGRADE PYELOGRAPHY Left 2/18/2022    Procedure: PYELOGRAM, RETROGRADE;  Surgeon: Ronel Whittington MD;  Location: [facility] OR 1ST FLR;  Service: Urology;  Laterality: Left;    SENTINEL LYMPH NODE BIOPSY Left 8/1/2019    Procedure: BIOPSY, LYMPH NODE, SENTINEL;  Surgeon: Samia Fulton MD;  Location: [facility] OR 2ND FLR;  Service: General;  Laterality: Left;    spt placement      TONSILLECTOMY, ADENOIDECTOMY      TOTAL ABDOMINAL HYSTERECTOMY W/ BILATERAL SALPINGOOPHORECTOMY  1985    URETEROSCOPY Left 2/18/2022    Procedure: URETEROSCOPY;  Surgeon: Ronel Whittington MD;  Location: [facility] OR [address] [place] [address];  Service: Urology;  Laterality: Left;       Social History     Socioeconomic History    Marital status:     Number of children: 0    Highest education level: Master's degree (e.g., MA, MS, Lelo, MEd, MSW, BANDAR)   Occupational History    Occupation: teacher     Comment: retired   Tobacco Use    Smoking status: Never     Passive exposure: Past    Smokeless tobacco: Never   Substance and Sexual Activity    Alcohol use: No     Alcohol/week: 0.0 standard drinks of alcohol    Drug use: No    Sexual activity: Not Currently     Birth control/protection: Abstinence   Social History Narrative    Single ()             Brother passed away last year.  Pt lives her her sister. (5/27)     Social Drivers of Health     Financial Resource Strain: Low Risk  (6/10/2025)    Overall Financial Resource Strain (CARDIA)     Difficulty of Paying Living Expenses: Not hard at all   Recent Concern: Financial Resource Strain - Medium Risk (4/17/2025)    Overall Financial Resource Strain (CARDIA)     Difficulty of Paying Living Expenses: Somewhat hard   Food Insecurity: No Food Insecurity (6/10/2025)    Hunger Vital Sign     Worried About Running Out of Food in the Last Year: Never true      Ran Out of Food in the Last Year: Never true   Transportation Needs: No Transportation Needs (6/10/2025)    PRAPARE - Transportation     Lack of Transportation (Medical): No     Lack of Transportation (Non-Medical): No   Physical Activity: Inactive (4/17/2025)    Exercise Vital Sign     Days of Exercise per Week: 0 days     Minutes of Exercise per Session: 0 min   Stress: No Stress Concern Present (6/10/2025)    [employer] [place] [employer] - Occupational Stress Questionnaire     Feeling of Stress : Not at all   Housing Stability: Low Risk  (6/10/2025)    Housing Stability Vital Sign     Unable to Pay for Housing in the Last Year: No     Number of Times Moved in the Last Year: 0     Homeless in the Last Year: No       Family History   Problem Relation Name Age of Onset    Diabetes Sister Kat     Kidney disease Sister Kat         CKD III    ALS Mother          d.    Cancer Maternal Grandmother          d. colon    Cancer Paternal Grandfather          d. lung    Scoliosis Brother [place]         increased pain    Prostate cancer Brother [place]         cured s/p surgery    Cancer Brother [place]         rare cancer that got into the bones    Diabetes Maternal Aunt      Kidney disease Maternal Aunt      Diabetes Maternal Uncle      Amblyopia Neg Hx      Blindness Neg Hx      Cataracts Neg Hx      Glaucoma Neg Hx      Macular degeneration Neg Hx      Retinal detachment Neg Hx      Strabismus Neg Hx         Physical Exam     Vitals:    06/12/25 0507 06/12/25 0602 06/12/25 0749 06/12/25 0822   BP: (!) 158/68  (!) 143/74    BP Location: Right arm      Patient Position: Lying      Pulse: 64  75 66   Resp: 18 18 18 18   Temp: 98 °F (36.7 °C)  98 °F (36.7 °C)    TempSrc: Oral      SpO2: 98%  97% 99%   Weight:       Height:           Physical Exam  Constitutional:       General: She is not in acute distress.     Appearance: She is not toxic-appearing.   HENT:      Head: Normocephalic and atraumatic.      Right Ear:  External ear normal.      Left Ear: External ear normal.      Nose: No congestion.   Eyes:      General: No scleral icterus.     Extraocular Movements: Extraocular movements intact.      Pupils: Pupils are equal, round, and reactive to light.   Cardiovascular:      Rate and Rhythm: Normal rate and regular rhythm.   Pulmonary:      Breath sounds: Wheezing (end expiratory wheezing) present.      Comments: Sating 88% on RA, placed on 2L NC  Abdominal:      General: There is no distension.      Tenderness: There is no abdominal tenderness. There is no right CVA tenderness, left CVA tenderness or guarding.   Musculoskeletal:         General: No swelling or deformity. Normal range of motion.      Cervical back: No rigidity.   Skin:     General: Skin is warm.   Neurological:      General: No focal deficit present.      Mental Status: She is alert and oriented to person, place, and time.      Cranial Nerves: No cranial nerve deficit.      Sensory: No sensory deficit.      Motor: No weakness.             Results     Labs Reviewed   COMPREHENSIVE METABOLIC PANEL - Abnormal       Result Value    Sodium 133 (*)     Potassium 4.0      Chloride 102      CO2 22 (*)     Glucose 120 (*)     BUN 14      Creatinine 1.8 (*)     Calcium 8.4 (*)     Protein Total 7.2      Albumin 3.0 (*)     Bilirubin Total 0.3       (*)     AST 29      ALT 13      Anion Gap 9      eGFR 30 (*)    B-TYPE NATRIURETIC PEPTIDE - Abnormal     (*)    URINALYSIS, REFLEX TO URINE CULTURE - Abnormal    Color, UA Brown (*)     Appearance, UA Cloudy (*)     pH, UA 8.0      Spec Grav UA 1.020      Protein, UA 3+ (*)     Glucose, UA Negative      Ketones, UA Negative      Bilirubin, UA Negative      Blood, UA 3+ (*)     Nitrites, UA Negative      Urobilinogen, UA Negative      Leukocyte Esterase, UA 3+ (*)    CBC WITH DIFFERENTIAL - Abnormal    WBC 7.79      RBC 3.66 (*)     HGB 10.6 (*)     HCT 34.0 (*)     MCV 93      MCH 29.0      MCHC 31.2 (*)      RDW 15.2 (*)     Platelet Count 206      MPV 8.4 (*)     Nucleated RBC 0      Neut % 60.0      Lymph % 19.1      Mono % 10.3      Eos % 9.5 (*)     Basophil % 0.6      Imm Grans % 0.5      Neut # 4.67      Lymph # 1.49      Mono # 0.80      Eos # 0.74 (*)     Baso # 0.05      Imm Grans # 0.04     URINALYSIS MICROSCOPIC - Abnormal    RBC, UA >100 (*)     WBC, UA >100 (*)     WBC Clumps, UA Many (*)     Bacteria, UA Many (*)     Yeast, UA None      Hyaline Casts, UA 0      Microscopic Comment       POCT GLUCOSE, HAND-HELD DEVICE - Abnormal    POC Glucose 213 (*)    INFLUENZA A & B BY MOLECULAR - Normal    INFLUENZA A MOLECULAR Negative      INFLUENZA B MOLECULAR  Negative     TROPONIN I HIGH SENSITIVITY - Normal    Troponin High Sensitive 14     SARS-COV-2 RNA AMPLIFICATION, QUAL - Normal    SARS COV-2 Molecular Negative     PHOSPHORUS - Normal    Phosphorus Level 3.5     MAGNESIUM - Normal    Magnesium  2.0     CULTURE, RESPIRATORY   CBC W/ AUTO DIFFERENTIAL    Narrative:     The following orders were created for panel order CBC auto differential.                  Procedure                               Abnormality         Status                                     ---------                               -----------         ------                                     CBC with Differential[[id number]]       Abnormal            Final result                                                 Please view results for these tests on the individual orders.   GREY TOP URINE HOLD    Extra Tube Hold for add-ons.         Imaging Results               CT Chest Without Contrast (Final result)  Result time 06/10/25 03:24:37      Final result by Ion Roth MD (06/10/25 03:24:37)                   Impression:      1. New scattered ground-glass opacities, nonspecific but may reflect a nonspecific infectious or non-infectious inflammatory process.  Pulmonary edema may present similarly.  Correlation advised.  2. Air in the left  renal collecting system with urothelial thickening concerning for a gas-forming urinary tract infection.  Recommend correlation with urinalysis and any history of  tract instrumentation.  3. Several unchanged bilateral pulmonary nodules.  4. Similar mosaic attenuation throughout the lungs possibly representing small airways disease.  5. Enlargement of the pulmonary trunk which may be seen the setting of pulmonary hypertension.  6. Additional findings as above.  This report was flagged in Epic as abnormal.    Electronically signed by resident: Ki Zavala  Date:    06/10/2025  Time:    02:18    Electronically signed by: Ion Roth  Date:    06/10/2025  Time:    03:24               Narrative:    EXAMINATION:  CT CHEST WITHOUT CONTRAST    CLINICAL HISTORY:  Cough, persistent    TECHNIQUE:  Low dose axial images, sagittal and coronal reformations were obtained from the thoracic inlet to the lung bases. Contrast was not administered.    COMPARISON:  Chest x-ray 06/09/2025, CT renal stone study 06/30/2021, CT chest 05/22/2021    FINDINGS:  SOFT TISSUES: No axillary or subpectoral lymphadenopathy. Several thyroid calcifications, unchanged.  Dependent body wall edema overlying the flanks.    HEART AND MEDIASTINUM: No mediastinal or hilar lymphadenopathy. Heart is normal size.  No pericardial effusion.  Multi-vessel coronary artery calcific atherosclerosis.  Dense calcification of the mitral annulus.  Aortic valvular calcification.  Pulmonary trunk is enlarged measuring 4.4 cm.  Left-sided aortic arch.    LUNGS, PLEURA, AND AIRWAYS: Airways are patent. Mosaic attenuation throughout the lungs, similar to prior.  Stable 0.6 cm solid nodule in the left lower lobe.  Stable pulmonary micronodule in the right upper lobe (series 302 image 156) and several additional stable smaller micro nodules.  Patchy ground-glass opacities throughout bilateral lungs not definitely seen on prior, for example in the right upper lobe  (series 302, image 91) and left upper lobe (series 302, image 192).No pleural effusion or pneumothorax.    UPPER ABDOMEN: Cholecystectomy.  Bilateral atrophic kidneys.  Air in the left renal collecting system with mildly asymmetric left perinephric fat stranding.  Left urothelial thickening.    BONES: Degenerative changes.  No acute fracture or aggressive osseous lesion.                        Preliminary result by Ki Zavala MD (06/10/25 02:29:18)                   Impression:      1. New scattered ground-glass opacities, nonspecific but may reflect a nonspecific infectious or non-infectious inflammatory process.  Pulmonary edema may present similarly.  Correlation advised.  2. Air in the left renal collecting system with urothelial thickening concerning for a gas-forming urinary tract infection.  Recommend correlation with urinalysis and any history of  tract instrumentation.  3. Several unchanged bilateral pulmonary nodules.  4. Similar mosaic attenuation throughout the lungs possibly representing small airways disease.  5. Enlargement of the pulmonary trunk which may be seen the setting of pulmonary hypertension.  6. Additional findings as above.  This report was flagged in Epic as abnormal.    Electronically signed by resident: Ki Zavala  Date:    06/10/2025  Time:    02:18                 Narrative:    EXAMINATION:  CT CHEST WITHOUT CONTRAST    CLINICAL HISTORY:  Cough, persistent;Respiratory illness, nondiagnostic xray;    TECHNIQUE:  Low dose axial images, sagittal and coronal reformations were obtained from the thoracic inlet to the lung bases. Contrast was not administered.    COMPARISON:  Chest x-ray 06/09/2025, CT renal stone study 06/30/2021, CT chest 05/22/2021    FINDINGS:  SOFT TISSUES: No axillary or subpectoral lymphadenopathy. Several thyroid calcifications, unchanged.  Dependent body wall edema overlying the flanks.    HEART AND MEDIASTINUM: No mediastinal or hilar lymphadenopathy.  Heart is normal size.  No pericardial effusion.  Multi-vessel coronary artery calcific atherosclerosis.  Dense calcification of the mitral annulus.  Aortic valvular calcification.  Pulmonary trunk is enlarged measuring 4.4 cm.  Left-sided aortic arch.    LUNGS, PLEURA, AND AIRWAYS: Airways are patent. Mosaic attenuation throughout the lungs, similar to prior.  Stable 0.6 cm solid nodule in the left lower lobe.  Stable pulmonary micronodule in the right upper lobe (series 302 image 156) and several additional stable smaller micro nodules.  Patchy ground-glass opacities throughout bilateral lungs not definitely seen on prior, for example in the right upper lobe (series 302, image 91) and left upper lobe (series 302, image 192).No pleural effusion or pneumothorax.    UPPER ABDOMEN: Cholecystectomy.  Bilateral atrophic kidneys.  Air in the left renal collecting system with mildly asymmetric left perinephric fat stranding.  Left urothelial thickening.    BONES: Degenerative changes.  No acute fracture or aggressive osseous lesion.                                       X-Ray Chest AP Portable (Final result)  Result time 06/09/25 19:44:26      Final result by Saul Garcia MD (06/09/25 19:44:26)                   Impression:      1. Pulmonary findings suggest edema, bronchial airway inflammation or infection not excluded.  No large focal consolidation.      Electronically signed by: Saul Garcia MD  Date:    06/09/2025  Time:    19:44               Narrative:    EXAMINATION:  XR CHEST AP PORTABLE    CLINICAL HISTORY:  Shortness of breath    TECHNIQUE:  Single frontal view of the chest was performed.    COMPARISON:  05/17/2025    FINDINGS:  The cardiomediastinal silhouette is not enlarged noting calcification of the aorta.  There is elevation of the right hemidiaphragm..  There is no pleural effusion.  The trachea is midline.  The lungs are symmetrically expanded bilaterally with coarse interstitial attenuation,  similar to the previous exam.  There is mild bilateral peribronchial thickening..  No large focal consolidation seen.  There is no pneumothorax.  The osseous structures are remarkable for degenerative change..                                        Initial [facility]   Medical Decision Making  Patient is a 74-year-old woman presenting with dark urine.  Most concerning for hematuria versus infectious process.  Will get UA to further evaluate.  Lower suspicion for stone given no abdominal pain or flank pain on exam.  Patient additionally presenting with cough and shortness of breath.  Patient with a wheezing on exam.  Patient has a significant smoking history.  Most concerning for COPD exacerbation.  Will treat with DuoNebs and reassess.   Also consider infectious etiology including pneumonia.  Lower suspicion for fluid overload given that patient just had her dialysis today.    Amount and/or Complexity of Data Reviewed  Labs: ordered. Decision-making details documented in ED Course.  Radiology: ordered. Decision-making details documented in ED Course.    Risk  Prescription drug management.                     Medications Given / Interventions     Medications   amLODIPine tablet 10 mg (10 mg Oral Given 6/12/25 0815)   carvediloL tablet 12.5 mg (12.5 mg Oral Given 6/12/25 0815)   anastrozole tablet 1 mg (1 mg Oral Given 6/12/25 0815)   DULoxetine DR capsule 40 mg (40 mg Oral Given 6/12/25 0813)   apixaban tablet 5 mg (5 mg Oral Given 6/12/25 0814)   furosemide tablet 80 mg (80 mg Oral Given 6/12/25 0814)   losartan tablet 100 mg (100 mg Oral Given 6/12/25 0814)   lactulose 20 gram/30 mL solution Soln 20 g (has no administration in time range)   methocarbamoL tablet 750 mg (750 mg Oral Given 6/11/25 2151)   oxybutynin 24 hr tablet 10 mg (10 mg Oral Given 6/12/25 0814)   levothyroxine tablet 50 mcg (50 mcg Oral Given 6/12/25 0601)   traZODone tablet 100 mg (100 mg Oral Given 6/11/25 2132)   sodium chloride 0.9% flush 10 mL (has  no administration in time range)   naloxone 0.4 mg/mL injection 0.02 mg (has no administration in time range)   glucose chewable tablet 16 g (has no administration in time range)   glucose chewable tablet 24 g (has no administration in time range)   dextrose 50% injection 12.5 g (has no administration in time range)   dextrose 50% injection 25 g (has no administration in time range)   glucagon (human recombinant) injection 1 mg (has no administration in time range)   acetaminophen tablet 650 mg (has no administration in time range)   ondansetron injection 4 mg (has no administration in time range)   insulin aspart U-100 pen 0-5 Units (3 Units Subcutaneous Given 6/11/25 1835)   HYDROcodone-acetaminophen  mg per tablet 1 tablet (1 tablet Oral Given 6/12/25 0602)   oxyCODONE immediate release tablet Tab 10 mg (10 mg Oral Given 6/10/25 1049)   albuterol-ipratropium 2.5 mg-0.5 mg/3 mL nebulizer solution 3 mL (3 mLs Nebulization Given 6/12/25 0822)   senna-docusate 8.6-50 mg per tablet 1 tablet (1 tablet Oral Given 6/11/25 2133)   polyethylene glycol packet 17 g (17 g Oral Not Given 6/11/25 0850)   dextromethorphan-guaiFENesin  mg per 12 hr tablet 1 tablet (1 tablet Oral Given 6/12/25 0814)   predniSONE tablet 40 mg (40 mg Oral Given 6/12/25 0813)   gentamicin - pharmacy to dose (has no administration in time range)   guaiFENesin 100 mg/5 ml syrup 200 mg (200 mg Oral Given 6/1952)   insulin glargine U-100 (Lantus) pen 8 Units (8 Units Subcutaneous Given 6/11/25 2134)   epoetin chloe injection 9,800 Units (9,800 Units Intravenous Given 6/11/25 1203)   LIDOcaine 5 % patch 1 patch (1 patch Transdermal Patch Applied 6/11/25 2130)   albuterol-ipratropium 2.5 mg-0.5 mg/3 mL nebulizer solution 9 mL (9 mLs Nebulization Given 6/9/25 1807)   predniSONE tablet 40 mg (40 mg Oral Given 6/9/25 1907)   cephALEXin capsule 500 mg (500 mg Oral Given 6/9/25 2007)   gentamicin (GARAMYCIN) 155.2 mg in 0.9% NaCl 100 mL IVPB (0  mg Intravenous Stopped 6/10/25 1125)       Procedures     ED POCUS Performed: No    Reassessment and ED Course     ED Course as of 06/12/25 0923   Mon Jun 09, 2025 1854 SARS COV-2 MOLECULAR: Negative [CH]   1854 Influenza A, Molecular: Negative [CH]   1854 Influenza B, Molecular: Negative [CH]   1854 Troponin I High Sensitivity: 14 [CH]   1854 BNP(!): 766  Chronically elevated [CH]   1854 CBC with a baseline chronic anemia [CH]   1855 CMP consistent with known ESRD [CH]   1855 X-Ray Chest AP Portable  No focal consolidation, significant pulmonary edema, or effusions noted on independent interpretation [CH]      ED Course User Index  [CH] Roula Ventura MD     Given new oxygen requirement, discussed with hospital medicine for admission.         Final diagnoses:  [R06.02] SOB (shortness of breath)  [J44.1] COPD exacerbation  [T83.511A, N39.0] Urinary tract infection associated with indwelling urethral catheter, initial encounter                 Dispo      ED Disposition Condition    Observation                                [1]   Current Facility-Administered Medications on File Prior to Encounter   Medication Dose Route Frequency Provider Last Rate Last Admin    epoetin chloe injection 2,000 Units  2,000 Units Intravenous 1 time in Clinic/HOD Emmanuel Hare Jr., MD        heparin (porcine) injection 2,000 Units  2,000 Units Intravenous Once Emmanuel Hare Jr., MD        heparin (porcine) injection 2,000 Units  2,000 Units Intravenous Once Emmanuel Hare Jr., MD        heparin (porcine) injection 4,000 Units  4,000 Units Intra-Catheter 1 time in Clinic/HOD Emmanuel Hare Jr., MD        iron sucrose injection 100 mg  100 mg Intravenous 1 time in Clinic/HOD Emmanuel Hare Jr., MD         Current Outpatient Medications on File Prior to Encounter   Medication Sig Dispense Refill    albuterol (PROVENTIL/VENTOLIN HFA) 90 mcg/actuation inhaler Inhale 1-2 puffs into the lungs every 6  "(six) hours as needed for Wheezing or Shortness of Breath. Rescue 8.5 g 1    amLODIPine (NORVASC) 10 MG tablet TAKE 1 TABLET BY MOUTH EVERY DAY 90 tablet 3    anastrozole (ARIMIDEX) 1 mg Tab TAKE 1 TABLET BY MOUTH EVERY DAY 90 tablet 2    BD INSULIN SYRINGE ULTRA-FINE 0.5 mL 31 gauge x 5/16" Syrg USE WITH INSULIN 4 TIMES A  each 12    carvediloL (COREG) 12.5 MG tablet Take 1 tablet (12.5 mg total) by mouth 2 (two) times daily. 180 tablet 3    diclofenac sodium (VOLTAREN) 1 % Gel Apply 2 g topically 2 (two) times daily as needed (arthritis). 3 each 2    DULoxetine (CYMBALTA) 20 MG capsule TAKE 2 CAPSULES(40 MG) BY MOUTH DAILY 180 capsule 1    ELIQUIS 5 mg Tab TAKE 1 TABLET BY MOUTH TWICE A  tablet 3    erenumab-aooe (AIMOVIG AUTOINJECTOR) 140 mg/mL autoinjector 140 mg MONTHLY (route: subcutaneous) 1 each 11    ergocalciferol (ERGOCALCIFEROL) 50,000 unit Cap Take 1 capsule (50,000 Units total) by mouth every 7 days. 12 capsule 3    furosemide (LASIX) 80 MG tablet Take 1 tablet (80 mg total) by mouth once daily. 30 tablet 11    galcanezumab-gnlm 120 mg/mL Syrg Inject 120 mg into the skin every 28 days. maintenance dose 1 mL 11    HYDROcodone-acetaminophen (NORCO)  mg per tablet Take 1 tablet by mouth every 6 (six) hours as needed for Pain. 120 tablet 0    ipratropium (ATROVENT) 21 mcg (0.03 %) nasal spray USE 2 SPRAYS IN EACH NOSTRIL TWICE DAILY 30 mL 0    lactulose (CHRONULAC) 10 gram/15 mL solution Take 20 g by mouth 2 (two) times daily as needed (constipation).      LANTUS SOLOSTAR U-100 INSULIN 100 unit/mL (3 mL) InPn pen INJECT 12-15 UNITS UNDER THE SKIN at night 15 mL 3    losartan (COZAAR) 100 MG tablet TAKE 1 TABLET(100 MG) BY MOUTH DAILY 90 tablet 3    magnesium oxide (MAG-OX) 400 mg (241.3 mg magnesium) tablet TAKE 1 TABLET(400 MG) BY MOUTH DAILY 30 tablet 0    methocarbamoL (ROBAXIN) 750 MG Tab TAKE 1 TO 2 TABLETS(750 TO 1500 MG) BY MOUTH THREE TIMES DAILY AS NEEDED FOR MUSCLE SPASMS 180 " "tablet 1    oxybutynin (DITROPAN-XL) 10 MG 24 hr tablet TAKE 1 TABLET BY MOUTH EVERY DAY 90 tablet 4    oxyCODONE (ROXICODONE) 10 mg Tab immediate release tablet Take 1 tablet (10 mg total) by mouth daily as needed (Severe pain). 30 tablet 0    pen needle, diabetic (BD ULTRA-FINE SHORT PEN NEEDLE) 31 gauge x 5/16" Ndle USE WITH LANTUS DAILY, e 11.65 100 each 3    sodium bicarbonate 650 MG tablet Take 1 tablet (650 mg total) by mouth once daily. 30 tablet 11    sucroferric oxyhydroxide (VELPHORO) 500 mg Chew Take by mouth 3 (three) times daily with meals.      sumatriptan (IMITREX) 100 MG tablet Take 1 tablet (100 mg total) by mouth every 2 (two) hours as needed for Migraine (Limit 2 in 14 hours and 9 in one month). 30 tablet 1    SYNTHROID 50 mcg tablet TAKE 1 TABLET BY MOUTH BEFORE BREAKFAST. ADMINISTER ON AN EMPTY STOMACH AT LEAST 30 MINUTES BEFORE FOOD. IF RECEIVING TUBE FEEDS, HOLD TUBE FEEDS FOR 1 HOUR BEFORE AND AFTER LEVOTHYROXINE ADMINISTRATION. 90 tablet 2    traZODone (DESYREL) 100 MG tablet TAKE 1 TABLET BY MOUTH NIGHTLY AS NEEDED FOR INSOMNIA. 90 tablet 2        Roula Ventura MD  06/12/25 0923    "

## 2025-06-10 ENCOUNTER — TELEPHONE (OUTPATIENT)
Dept: UROLOGY | Facility: CLINIC | Age: 75
End: 2025-06-10
Payer: MEDICARE

## 2025-06-10 PROBLEM — R06.02 SHORTNESS OF BREATH: Status: ACTIVE | Noted: 2025-05-17

## 2025-06-10 PROBLEM — R31.9 HEMATURIA: Status: ACTIVE | Noted: 2025-06-10

## 2025-06-10 LAB
ABSOLUTE EOSINOPHIL (OHS): 0 K/UL
ABSOLUTE MONOCYTE (OHS): 0.08 K/UL (ref 0.3–1)
ABSOLUTE NEUTROPHIL COUNT (OHS): 4.68 K/UL (ref 1.8–7.7)
ALBUMIN SERPL BCP-MCNC: 3 G/DL (ref 3.5–5.2)
ALP SERPL-CCNC: 150 UNIT/L (ref 40–150)
ALT SERPL W/O P-5'-P-CCNC: 11 UNIT/L (ref 10–44)
ANION GAP (OHS): 11 MMOL/L (ref 8–16)
AST SERPL-CCNC: 13 UNIT/L (ref 11–45)
BACTERIA #/AREA URNS AUTO: ABNORMAL /HPF
BASOPHILS # BLD AUTO: 0.02 K/UL
BASOPHILS NFR BLD AUTO: 0.4 %
BILIRUB SERPL-MCNC: 0.2 MG/DL (ref 0.1–1)
BILIRUB UR QL STRIP.AUTO: NEGATIVE
BUN SERPL-MCNC: 19 MG/DL (ref 8–23)
CALCIUM SERPL-MCNC: 8.3 MG/DL (ref 8.7–10.5)
CHLORIDE SERPL-SCNC: 99 MMOL/L (ref 95–110)
CLARITY UR: ABNORMAL
CO2 SERPL-SCNC: 20 MMOL/L (ref 23–29)
COLOR UR AUTO: ABNORMAL
CREAT SERPL-MCNC: 2.4 MG/DL (ref 0.5–1.4)
ERYTHROCYTE [DISTWIDTH] IN BLOOD BY AUTOMATED COUNT: 15 % (ref 11.5–14.5)
GFR SERPLBLD CREATININE-BSD FMLA CKD-EPI: 21 ML/MIN/1.73/M2
GLUCOSE SERPL-MCNC: 176 MG/DL (ref 70–110)
GLUCOSE UR QL STRIP: ABNORMAL
HCT VFR BLD AUTO: 36.9 % (ref 37–48.5)
HGB BLD-MCNC: 11.1 GM/DL (ref 12–16)
HGB UR QL STRIP: ABNORMAL
HOLD SPECIMEN: NORMAL
HYALINE CASTS UR QL AUTO: 0 /LPF (ref 0–1)
IMM GRANULOCYTES # BLD AUTO: 0.04 K/UL (ref 0–0.04)
IMM GRANULOCYTES NFR BLD AUTO: 0.7 % (ref 0–0.5)
KETONES UR QL STRIP: NEGATIVE
LEUKOCYTE ESTERASE UR QL STRIP: ABNORMAL
LYMPHOCYTES # BLD AUTO: 0.63 K/UL (ref 1–4.8)
MAGNESIUM SERPL-MCNC: 2 MG/DL (ref 1.6–2.6)
MCH RBC QN AUTO: 28.7 PG (ref 27–31)
MCHC RBC AUTO-ENTMCNC: 30.1 G/DL (ref 32–36)
MCV RBC AUTO: 95 FL (ref 82–98)
MICROSCOPIC COMMENT: ABNORMAL
NITRITE UR QL STRIP: NEGATIVE
NUCLEATED RBC (/100WBC) (OHS): 0 /100 WBC
OHS QRS DURATION: 78 MS
OHS QTC CALCULATION: 433 MS
PH UR STRIP: 6 [PH]
PHOSPHATE SERPL-MCNC: 4.4 MG/DL (ref 2.7–4.5)
PLATELET # BLD AUTO: 198 K/UL (ref 150–450)
PMV BLD AUTO: 8.7 FL (ref 9.2–12.9)
POCT GLUCOSE: 220 MG/DL (ref 70–110)
POCT GLUCOSE: 235 MG/DL (ref 70–110)
POCT GLUCOSE: 241 MG/DL (ref 70–110)
POCT GLUCOSE: 253 MG/DL (ref 70–110)
POTASSIUM SERPL-SCNC: 3.9 MMOL/L (ref 3.5–5.1)
PROT SERPL-MCNC: 6.9 GM/DL (ref 6–8.4)
PROT UR QL STRIP: ABNORMAL
RBC # BLD AUTO: 3.87 M/UL (ref 4–5.4)
RBC #/AREA URNS AUTO: >100 /HPF (ref 0–4)
RELATIVE EOSINOPHIL (OHS): 0 %
RELATIVE LYMPHOCYTE (OHS): 11.6 % (ref 18–48)
RELATIVE MONOCYTE (OHS): 1.5 % (ref 4–15)
RELATIVE NEUTROPHIL (OHS): 85.8 % (ref 38–73)
SODIUM SERPL-SCNC: 130 MMOL/L (ref 136–145)
SP GR UR STRIP: 1.02
SQUAMOUS #/AREA URNS AUTO: 19 /HPF
UROBILINOGEN UR STRIP-ACNC: NEGATIVE EU/DL
WBC # BLD AUTO: 5.45 K/UL (ref 3.9–12.7)
WBC #/AREA URNS AUTO: >100 /HPF (ref 0–5)
YEAST UR QL AUTO: ABNORMAL /HPF

## 2025-06-10 PROCEDURE — 63600175 PHARM REV CODE 636 W HCPCS: Performed by: STUDENT IN AN ORGANIZED HEALTH CARE EDUCATION/TRAINING PROGRAM

## 2025-06-10 PROCEDURE — 25000003 PHARM REV CODE 250: Performed by: NURSE PRACTITIONER

## 2025-06-10 PROCEDURE — 81003 URINALYSIS AUTO W/O SCOPE: CPT | Performed by: NURSE PRACTITIONER

## 2025-06-10 PROCEDURE — 99900035 HC TECH TIME PER 15 MIN (STAT)

## 2025-06-10 PROCEDURE — 94761 N-INVAS EAR/PLS OXIMETRY MLT: CPT

## 2025-06-10 PROCEDURE — 11000001 HC ACUTE MED/SURG PRIVATE ROOM: Mod: HCNC

## 2025-06-10 PROCEDURE — 84100 ASSAY OF PHOSPHORUS: CPT | Performed by: NURSE PRACTITIONER

## 2025-06-10 PROCEDURE — 25000242 PHARM REV CODE 250 ALT 637 W/ HCPCS: Performed by: NURSE PRACTITIONER

## 2025-06-10 PROCEDURE — 82947 ASSAY GLUCOSE BLOOD QUANT: CPT | Performed by: NURSE PRACTITIONER

## 2025-06-10 PROCEDURE — 36415 COLL VENOUS BLD VENIPUNCTURE: CPT | Performed by: NURSE PRACTITIONER

## 2025-06-10 PROCEDURE — 63600175 PHARM REV CODE 636 W HCPCS: Performed by: NURSE PRACTITIONER

## 2025-06-10 PROCEDURE — 25000003 PHARM REV CODE 250

## 2025-06-10 PROCEDURE — 85025 COMPLETE CBC W/AUTO DIFF WBC: CPT | Performed by: NURSE PRACTITIONER

## 2025-06-10 PROCEDURE — 27000221 HC OXYGEN, UP TO 24 HOURS

## 2025-06-10 PROCEDURE — 94640 AIRWAY INHALATION TREATMENT: CPT | Mod: XB

## 2025-06-10 PROCEDURE — 83735 ASSAY OF MAGNESIUM: CPT | Performed by: NURSE PRACTITIONER

## 2025-06-10 PROCEDURE — 87086 URINE CULTURE/COLONY COUNT: CPT | Performed by: NURSE PRACTITIONER

## 2025-06-10 PROCEDURE — 99214 OFFICE O/P EST MOD 30 MIN: CPT | Mod: ,,, | Performed by: NURSE PRACTITIONER

## 2025-06-10 PROCEDURE — 96365 THER/PROPH/DIAG IV INF INIT: CPT | Mod: HCNC

## 2025-06-10 PROCEDURE — 25000003 PHARM REV CODE 250: Performed by: STUDENT IN AN ORGANIZED HEALTH CARE EDUCATION/TRAINING PROGRAM

## 2025-06-10 RX ORDER — GUAIFENESIN 100 MG/5ML
200 LIQUID ORAL EVERY 4 HOURS PRN
Status: DISCONTINUED | OUTPATIENT
Start: 2025-06-10 | End: 2025-06-12 | Stop reason: HOSPADM

## 2025-06-10 RX ORDER — AMOXICILLIN 250 MG
1 CAPSULE ORAL 2 TIMES DAILY
Status: DISCONTINUED | OUTPATIENT
Start: 2025-06-10 | End: 2025-06-12 | Stop reason: HOSPADM

## 2025-06-10 RX ORDER — PREDNISONE 20 MG/1
40 TABLET ORAL DAILY
Status: DISCONTINUED | OUTPATIENT
Start: 2025-06-10 | End: 2025-06-12 | Stop reason: HOSPADM

## 2025-06-10 RX ORDER — POLYETHYLENE GLYCOL 3350 17 G/17G
17 POWDER, FOR SOLUTION ORAL DAILY
Status: DISCONTINUED | OUTPATIENT
Start: 2025-06-10 | End: 2025-06-12 | Stop reason: HOSPADM

## 2025-06-10 RX ADMIN — LEVOTHYROXINE SODIUM 50 MCG: 0.05 TABLET ORAL at 05:06

## 2025-06-10 RX ADMIN — AMLODIPINE BESYLATE 10 MG: 10 TABLET ORAL at 10:06

## 2025-06-10 RX ADMIN — GUAIFENESIN 200 MG: 200 SOLUTION ORAL at 10:06

## 2025-06-10 RX ADMIN — GUAIFENESIN, DEXTROMETHORPHAN HBR 1 TABLET: 600; 30 TABLET ORAL at 10:06

## 2025-06-10 RX ADMIN — DULOXETINE HYDROCHLORIDE 40 MG: 20 CAPSULE, DELAYED RELEASE ORAL at 10:06

## 2025-06-10 RX ADMIN — INSULIN GLARGINE 5 UNITS: 100 INJECTION, SOLUTION SUBCUTANEOUS at 10:06

## 2025-06-10 RX ADMIN — OXYBUTYNIN CHLORIDE 10 MG: 10 TABLET, EXTENDED RELEASE ORAL at 10:06

## 2025-06-10 RX ADMIN — IPRATROPIUM BROMIDE AND ALBUTEROL SULFATE 3 ML: 2.5; .5 SOLUTION RESPIRATORY (INHALATION) at 02:06

## 2025-06-10 RX ADMIN — CARVEDILOL 12.5 MG: 12.5 TABLET, FILM COATED ORAL at 08:06

## 2025-06-10 RX ADMIN — GUAIFENESIN 200 MG: 200 SOLUTION ORAL at 07:06

## 2025-06-10 RX ADMIN — IPRATROPIUM BROMIDE AND ALBUTEROL SULFATE 3 ML: 2.5; .5 SOLUTION RESPIRATORY (INHALATION) at 08:06

## 2025-06-10 RX ADMIN — TRAZODONE HYDROCHLORIDE 100 MG: 50 TABLET ORAL at 08:06

## 2025-06-10 RX ADMIN — GUAIFENESIN, DEXTROMETHORPHAN HBR 1 TABLET: 600; 30 TABLET ORAL at 08:06

## 2025-06-10 RX ADMIN — SENNOSIDES AND DOCUSATE SODIUM 1 TABLET: 50; 8.6 TABLET ORAL at 10:06

## 2025-06-10 RX ADMIN — APIXABAN 5 MG: 5 TABLET, FILM COATED ORAL at 10:06

## 2025-06-10 RX ADMIN — HYDROCODONE BITARTRATE AND ACETAMINOPHEN 1 TABLET: 10; 325 TABLET ORAL at 07:06

## 2025-06-10 RX ADMIN — APIXABAN 5 MG: 5 TABLET, FILM COATED ORAL at 08:06

## 2025-06-10 RX ADMIN — CARVEDILOL 12.5 MG: 12.5 TABLET, FILM COATED ORAL at 10:06

## 2025-06-10 RX ADMIN — LOSARTAN POTASSIUM 100 MG: 50 TABLET, FILM COATED ORAL at 10:06

## 2025-06-10 RX ADMIN — INSULIN ASPART 2 UNITS: 100 INJECTION, SOLUTION INTRAVENOUS; SUBCUTANEOUS at 12:06

## 2025-06-10 RX ADMIN — OXYCODONE HYDROCHLORIDE 10 MG: 10 TABLET ORAL at 10:06

## 2025-06-10 RX ADMIN — PREDNISONE 40 MG: 20 TABLET ORAL at 10:06

## 2025-06-10 RX ADMIN — IPRATROPIUM BROMIDE AND ALBUTEROL SULFATE 3 ML: 2.5; .5 SOLUTION RESPIRATORY (INHALATION) at 09:06

## 2025-06-10 RX ADMIN — ANASTROZOLE 1 MG: 1 TABLET, COATED ORAL at 10:06

## 2025-06-10 RX ADMIN — INSULIN ASPART 1 UNITS: 100 INJECTION, SOLUTION INTRAVENOUS; SUBCUTANEOUS at 10:06

## 2025-06-10 RX ADMIN — FUROSEMIDE 80 MG: 80 TABLET ORAL at 10:06

## 2025-06-10 RX ADMIN — INSULIN ASPART 2 UNITS: 100 INJECTION, SOLUTION INTRAVENOUS; SUBCUTANEOUS at 05:06

## 2025-06-10 RX ADMIN — GENTAMICIN SULFATE 155.2 MG: 40 INJECTION, SOLUTION INTRAMUSCULAR; INTRAVENOUS at 10:06

## 2025-06-10 NOTE — PROGRESS NOTES
"[facility] [address] [place] [address] Medicine  Progress Note    Patient Name: Cecile Bowen  MRN: [id number]  Patient Class: OP- Observation   Admission Date: 6/9/2025  Length of Stay: 0 days  Attending Physician: Sabina Carranza MD  Primary Care Provider: Lurdes Navarro MD        Subjective     Principal Problem:Shortness of breath        HPI:  Cecile Bowen is a 74 y.o. female with a PMHx of HTN, HLD, hypothyroidism, DM2, migraines, anemia, fibromyalgia, insomnia, depression, chronic pain, VIJAYA, urinary retention s/p suprapubic catheter placement, and ESRD on HD (MWF) who presents for evaluation of hematuria, shortness of breath, and cough. She reports shortness of breath, intermittent wheezing, and cough productive of "greyish" sputum x6-7 days. Notes she has had this issue several times in the past and thought she may have some extra fluid. She went to her dialysis appointment today and had a 4 hour session completed. She reports she was still having the symptoms after HD and is under her usual dry weight. She also notes hematuria beginning this morning. Reports she typically has this when she is getting a UTI. She denies abdominal pain, fever/chills, N/VD, CP, congestion, or sore throat. She does endorse BLE edema, right leg greater than left, that is chronic and better than baseline. Pt reports she is wheelchair bound at baseline and uses a christina lift to transfer.    In the ED, VSS, afebrile. CBC with stable anemia. Na 133. Bicarb 22. Glucose 120. Cr 1.8 (hx of ESRD). Calcium 8.4. Albumin 3.0. . . HS troponin 14. EKG shows SR w/ PACs, 78 bpm, no ST elevation. Flu/COVID negative. UA with 3+ leukocytes, >100 RBCs, >100 WBCs, and many bacteria; urine sample obtained from chronic suprapubic catheter that was last changed about 4 weeks ago. Will have nursing exchange catheter and obtain new UA. CXR with pulmonary findings suggest edema, bronchial airway inflammation or infection not " excluded. No large focal consolidation. The patient received a duo neb treatment, prednisone 40mg, and keflex.    Overview/Hospital Course:  Patient admitted to hospital medicine for SOB and UTI. Patient SOB 2/2 volume overload as she recently ran out of her lasix. Nephrology consulted for HD. Continued lasix, attempt to ween O2 back to RA as tolerated. Patient noted to have UTI, HX of ESBL and MDRO. Started gentamicin for now based on previous cultures.    Interval History: Patient examined at bedside who is resting comfortably. VSS, no acute distress. No overnight events reported. Patient reports mild improvement of SOB overnight. Still requiring supplemental O2 to maintain sats. Continue current regimen of lasix and HD. Patient with UTI, based on previous cultures started on gentamicin for now. Follow cultures. Suprapubic cath replaced in ED      Review of Systems   Constitutional:  Negative for chills and fever.   Respiratory:  Positive for cough and shortness of breath. Negative for chest tightness.    Cardiovascular:  Negative for chest pain and leg swelling.   Gastrointestinal:  Negative for abdominal pain and nausea.   Genitourinary:  Positive for hematuria.   Neurological:  Negative for dizziness and weakness.     Objective:     Vital Signs (Most Recent):  Temp: 98.2 °F (36.8 °C) (06/10/25 1030)  Pulse: 87 (06/10/25 1030)  Resp: 18 (06/10/25 1049)  BP: (!) 172/82 (06/10/25 1030)  SpO2: 97 % (06/10/25 1030) Vital Signs (24h Range):  Temp:  [97.4 °F (36.3 °C)-98.8 °F (37.1 °C)] 98.2 °F (36.8 °C)  Pulse:  [66-87] 87  Resp:  [16-28] 18  SpO2:  [94 %-100 %] 97 %  BP: (100-172)/(58-82) 172/82     Weight: 98 kg (216 lb 0.8 oz)  Body mass index is 32.85 kg/m².    Intake/Output Summary (Last 24 hours) at 6/10/2025 1305  Last data filed at 6/10/2025 0533  Gross per 24 hour   Intake 420 ml   Output 70 ml   Net 350 ml         Physical Exam  Vitals and nursing note reviewed.   Constitutional:       General: She is not  in acute distress.     Appearance: Normal appearance.   HENT:      Head: Normocephalic and atraumatic.   Cardiovascular:      Rate and Rhythm: Normal rate and regular rhythm.      Heart sounds: Normal heart sounds. No murmur heard.  Pulmonary:      Effort: Pulmonary effort is normal. No respiratory distress.      Breath sounds: Normal breath sounds.      Comments: On 2L NC  Abdominal:      General: Abdomen is flat.      Tenderness: There is no abdominal tenderness.   Musculoskeletal:         General: Normal range of motion.      Right lower leg: No edema.      Left lower leg: No edema.   Skin:     General: Skin is warm and dry.   Neurological:      General: No focal deficit present.      Mental Status: She is alert. Mental status is at baseline.   Psychiatric:         Mood and Affect: Mood normal.         Behavior: Behavior normal.               Significant Labs: All pertinent labs within the past 24 hours have been reviewed.  CBC:   Recent Labs   Lab 06/09/25 1756 06/10/25  0731   WBC 7.79 5.45   HGB 10.6* 11.1*   HCT 34.0* 36.9*    198     CMP:   Recent Labs   Lab 06/09/25  1756 06/10/25  0731   * 130*   K 4.0 3.9    99   CO2 22* 20*   * 176*   BUN 14 19   CREATININE 1.8* 2.4*   CALCIUM 8.4* 8.3*   PROT 7.2 6.9   ALBUMIN 3.0* 3.0*   BILITOT 0.3 0.2   ALKPHOS 153* 150   AST 29 13   ALT 13 11   ANIONGAP 9 11       Significant Imaging: I have reviewed all pertinent imaging results/findings within the past 24 hours.      Assessment & Plan  Shortness of breath  Pt reports shortness of breath and productive cough x6-7 days. Intermittent wheezing. Denies rhinorrhea, sore throat, fever, or chills. Possible bronchitis or undiagnosed COPD or hypervolemia; pt reports she has never been a smoker but notes her father and brother that she previously lived with did smoke.  -Afebrile, no leukocytosis.  -  -CXR with pulmonary findings suggest edema, bronchial airway inflammation or infection not  excluded. No large focal consolidation.   -CT chest with scatter groudn glass opacities, enlargement of pulmonary trunk  -Sputum cx pending.  -Flu/COVID negative.  -Duo nebs q6h while awake and PRN.   -Will start mucinex DM and prednisone 40mg qd  -PFTs ordered outpatient but have not been completed.  UTI (urinary tract infection)  Hematuria  Chronic suprapubic catheter  -Pt reports hematuria starting today. Denies abdominal pain or fever/chills.  -Afebrile, no leukocytosis; 0/4 SIRS  -Initial UA obtained from chronic suprapubic murillo, concern for contaminated sample given catheter has been in place for almost 4 weeks.  -Nursing exchanged suprapubic catheter 6/9.  -Repeat UA infectious  -start gentamicin based on previous cultures  -Suprapubic catheter care per nursing.  Hypothyroidism  Patient has chronic hypothyroidism. TFTs reviewed-   Lab Results   Component Value Date    TSH 1.631 12/31/2024   . Will continue chronic levothyroxine and adjust for and clinical changes.  Hyperlipidemia   Patient is not chronically on statin.Monitor clinically. Last LDL was   Lab Results   Component Value Date    LDLCALC 85 04/22/2024     VIJAYA (obstructive sleep apnea)  -Reports history of PTSD so unable to tolerate CPAP.  Major depressive disorder, recurrent, moderate  Patient has recurrent depression which is moderate and is currently controlled. Will Continue anti-depressant medications. We will not consult psychiatry at this time. Patient does not display psychosis at this time. Continue to monitor closely and adjust plan of care as needed.  -Continue home cymbalta and trazodone.  Insomnia  -Chronic, stable.  -Continue trazodone.  Class 1 obesity due to excess calories with serious comorbidity in adult  Body mass index is 32.85 kg/m². Obesity complicates all aspects of disease management from diagnostic modalities to treatment. Weight loss encouraged and health benefits explained to patient.   Chronic daily headache  -Chronic  issue.   -Continue PRN tylenol and sumatriptan.  Chronic pain  Fibromyalgia  -Chronic, controlled.  - reviewed, continue home norco, oxy and robaxin prn  Malignant neoplasm of left breast, estrogen receptor positive  Prophylactic use of anastrozole (Arimidex)  Grade I invasive ductal carcinoma ER+, CT+ s/p radical mastectomy with sentinel lymph node bx   -Follows with oncology  -Continue anastrazole  Anemia in ESRD (end-stage renal disease)  Anemia is likely due to chronic disease due to ESRD. Most recent hemoglobin and hematocrit are listed below.  Recent Labs     06/09/25  1756 06/10/25  0731   HGB 10.6* 11.1*   HCT 34.0* 36.9*     Plan  - Monitor serial CBC: Daily  - Transfuse PRBC if patient becomes hemodynamically unstable, symptomatic or H/H drops below 7/21.  - Patient has not received any PRBC transfusions to date  - Patient's anemia is currently stable  ESRD (end stage renal disease) on dialysis  MWF schedule, last dialyzed 6/9  Residual renal function?- Yes    - Nephrology consulted  - Continue chronic hemodialysis  - Monitor daily electrolytes and defer dialysis orders to nephrology  - Renally dose medications  - Renal diet  - Continue phosphorus binders  - Daily weights/strict I&Os  - 1.5L FR  Primary hypertension  Patient's blood pressure range in the last 24 hours was: BP  Min: 100/63  Max: 172/82.The patient's inpatient anti-hypertensive regimen is listed below:  Current Antihypertensives  amLODIPine tablet 10 mg, Daily, Oral  carvediloL tablet 12.5 mg, 2 times daily, Oral  furosemide tablet 80 mg, Daily, Oral  losartan tablet 100 mg, Daily, Oral    Plan  - BP is controlled, no changes needed to their regimen  Type 2 diabetes mellitus with kidney complication, with long-term current use of insulin  Patient's FSGs are controlled on current medication regimen.  Last A1c reviewed-   Lab Results   Component Value Date    HGBA1C 5.4 04/16/2025     Most recent fingerstick glucose reviewed-   Recent Labs    Lab 06/10/25  0815 06/10/25  1124   POCTGLUCOSE 253* 220*     Current correctional scale  Low  Maintain anti-hyperglycemic dose as follows-   Antihyperglycemics (From admission, onward)      Start     Stop Route Frequency Ordered    06/09/25 2215  insulin glargine U-100 (Lantus) pen 5 Units         -- SubQ Nightly 06/09/25 2110    06/09/25 2136  insulin aspart U-100 pen 0-5 Units         -- SubQ Before meals & nightly PRN 06/09/25 2037          Hold Oral hypoglycemics while patient is in the hospital.  -Accuchecks AC/HS  Unspecified atrial fibrillation  Patient has paroxysmal (<7 days) atrial fibrillation. Patient is currently in sinus rhythm. [id number] Score: 3. The patients heart rate in the last 24 hours is as follows:  Pulse  Min: 66  Max: 87     Antiarrhythmics   coreg 12.5mg, bid, oral    Anticoagulants  apixaban tablet 5 mg, 2 times daily, Oral    Plan  - Replete lytes with a goal of K>4, Mg >2  - Patient is anticoagulated, see medications listed above.  - Patient's afib is currently controlled  VTE Risk Mitigation (From admission, onward)           Ordered     apixaban tablet 5 mg  2 times daily         06/09/25 2037     IP VTE HIGH RISK PATIENT  Once         06/09/25 2037     Place sequential compression device  Until discontinued         06/09/25 2037                    Discharge Planning   FLORENTINO: 6/12/2025     Code Status: Full Code   Medical Readiness for Discharge Date:   Discharge Plan A: Home Health                        Fletcher Bowen PA-C  Department of Hospital Medicine   [facility]

## 2025-06-10 NOTE — PLAN OF CARE
[facility]  Discharge Assessment    Primary Care Provider: Lurdes Navarro MD     Discharge Assessment (most recent)       BRIEF DISCHARGE ASSESSMENT - 06/10/25 1237          Discharge Planning    Assessment Type Discharge Planning Brief Assessment     Resource/Environmental Concerns none     Support Systems Family members     Equipment Currently Used at Home wheelchair;bedside commode;raised toilet;shower chair;cane, straight     Current Living Arrangements home     Patient/Family Anticipates Transition to home;home with help/services     Patient/Family Anticipated Services at Transition home health care     DME Needed Upon Discharge  none     Discharge Plan A Home Health     Discharge Plan B Home Health                   Pt is a 74 y.female admitted with SOB and has a PMH of ESRD ([facility]). She lives with her sister in a single story house with no steps. She requires assistance with her self care. She will need an ambulance to transfer her home. [facility] Discharge Packet given to patient and/or family with understanding verbalized.   name and number and estimated discharge date written on white board in patient's room with request to call for any questions or concerns.  Will continue to follow for needs.  Discharge Plan A and Plan B have been determined by review of patient's clinical status, future medical and therapeutic needs, and coverage/benefits for post-acute care in coordination with multidisciplinary team members.  Nithin Botello RN,BSN

## 2025-06-10 NOTE — ASSESSMENT & PLAN NOTE
Grade I invasive ductal carcinoma ER+, [place]+ s/p radical mastectomy with sentinel lymph node bx   -Follows with oncology  -Continue anastrazole

## 2025-06-10 NOTE — H&P
"Petros devante - Emergency Dept  [facility] Medicine  History & Physical    Patient Name: Cecile Bowen  MRN: [id number]  Patient Class: OP- Observation  Admission Date: 6/9/2025  Attending Physician: Sabina Carranza MD   Primary Care Provider: Lurdes Navarro MD         Patient information was obtained from patient, past medical records, and ER records.     Subjective:     Principal Problem:Shortness of breath    Chief Complaint:   Chief Complaint   Patient presents with    Hematuria     Blood in urine starting today. Pt also endorses some wheezes. Pt has been taking PRN prescribed albuterol at home. Denies other c/o. Sent from dialysis clinic. Was able to complete dialysis today.         HPI: Cecile Bowen is a 74 y.o. female with a PMHx of HTN, HLD, hypothyroidism, DM2, migraines, anemia, fibromyalgia, insomnia, depression, chronic pain, VIJAYA, urinary retention s/p suprapubic catheter placement, and ESRD on HD (MWF) who presents for evaluation of hematuria, shortness of breath, and cough. She reports shortness of breath, intermittent wheezing, and cough productive of "greyish" sputum x6-7 days. Notes she has had this issue several times in the past and thought she may have some extra fluid. She went to her dialysis appointment today and had a 4 hour session completed. She reports she was still having the symptoms after HD and is under her usual dry weight. She also notes hematuria beginning this morning. Reports she typically has this when she is getting a UTI. She denies abdominal pain, fever/chills, N/VD, CP, congestion, or sore throat. She does endorse BLE edema, right leg greater than left, that is chronic and better than baseline. Pt reports she is wheelchair bound at baseline and uses a christina lift to transfer.    In the ED, VSS, afebrile. CBC with stable anemia. Na 133. Bicarb 22. Glucose 120. Cr 1.8 (hx of ESRD). Calcium 8.4. Albumin 3.0. . . HS troponin 14. EKG shows SR w/ PACs, 78 bpm, " no ST elevation. Flu/COVID negative. UA with 3+ leukocytes, >100 RBCs, >100 WBCs, and many bacteria; urine sample obtained from chronic suprapubic catheter that was last changed about 4 weeks ago. Will have nursing exchange catheter and obtain new UA. CXR with pulmonary findings suggest edema, bronchial airway inflammation or infection not excluded. No large focal consolidation. The patient received a duo neb treatment, prednisone 40mg, and keflex.    Past Medical History:   Diagnosis Date    Bacteremia due to Enterococcus 09/28/2021    Catheter-associated urinary tract infection 09/29/2021    Cervicogenic migraine     Chronic pain     CKD (chronic kidney disease) stage 4, GFR 15-29 ml/min     [facility]    CKD (chronic kidney disease) stage 4, GFR 15-29 ml/min     Diabetes mellitus     Long term use of Insulin, Diabetic Neuropathy    Dialysis patient 01/21/2022    Fibromyalgia     Hydronephrosis     Hyperlipidemia     Hypertension 12/12/2012    Hypothyroidism 12/12/2012    ARON (iron deficiency anemia)     Insomnia     Levoscoliosis     Malignant neoplasm of upper-outer quadrant of left breast in female, estrogen receptor positive     Metabolic acidosis     Mobility impaired     Nuclear sclerosis - Both Eyes 03/24/2014    VIJAYA (obstructive sleep apnea)     Osteopenia     Pulmonary nodule     Recurrent UTI     Renal manifestation of secondary diabetes mellitus     Secondary hyperparathyroidism, renal     Urinary retention     Urinary tract infection due to ESBL Klebsiella        Past Surgical History:   Procedure Laterality Date    ANGIOGRAPHY OF UPPER EXTREMITY N/A 9/19/2024    Procedure: Angiogram Extremity Bilateral;  Surgeon: RODRIGO Friedman III, MD;  Location: [facility] OR [address] [place] [address];  Service: Vascular;  Laterality: N/A;  R femoral approach, arch & arm angiogram  168.76mgy  33.1145 gycm2  8.9min    AV FISTULA PLACEMENT Left 2/14/2024    Procedure: CREATION, AV FISTULA;  Surgeon: RODRIGO Friedman III, MD;  Location:  [facility] OR 2ND FLR;  Service: Vascular;  Laterality: Left;  LUE AVF creation vs AVG insertion    BIOPSY OF URETER Left 2/18/2022    Procedure: BIOPSY, URETER;  Surgeon: Ronel Whittington MD;  Location: [facility] OR 1ST FLR;  Service: Urology;  Laterality: Left;    BREAST BIOPSY Right     benign    CHOLECYSTECTOMY      COLONOSCOPY N/A 1/13/2017    Procedure: COLONOSCOPY;  Surgeon: Morris Wiseman MD;  Location: [facility] ENDO (4TH FLR);  Service: Endoscopy;  Laterality: N/A;  Renal pt Nephrology advised to avoid phosphate preps    COLONOSCOPY N/A 12/7/2023    Procedure: COLONOSCOPY;  Surgeon: Herve Allen MD;  Location: [facility] ENDO (2ND FLR);  Service: Endoscopy;  Laterality: N/A;  HD MWF; labs prior  suprapubic catheter  pt does not ambulate-uses christina lift- will have sling with her  9/7 ref. by Anastasiia Campbell, , PEG, instr. mailed, Eliquis hold approval pending-[facility] to hold Eliquis 2 days per Dr Navarro-GT  1/30-precall complete-MS    CYSTOSCOPY N/A 10/8/2018    Procedure: CYSTOSCOPY;  Surgeon: Ronel Whittington MD;  [facility];  Service: Urology;  Laterality: N/A;  45 min    CYSTOSCOPY N/A 3/25/2019    Procedure: CYSTOSCOPY;  Surgeon: Ronel Whittington MD;  [facility];  Service: Urology;  Laterality: N/A;  45 min    CYSTOSCOPY N/A 8/26/2019    Procedure: CYSTOSCOPY;  Surgeon: Rnoel Whittington MD;  [facility];  Service: Urology;  Laterality: N/A;  45 min    CYSTOSCOPY N/A 7/2/2021    Procedure: CYSTOSCOPY;  Surgeon: Ronel Whittington MD;  [facility];  Service: Urology;  Laterality: N/A;    CYSTOSCOPY  12/23/2021    Procedure: CYSTOSCOPY;  Surgeon: Ronel Whittington MD;  [facility];  Service: Urology;;    CYSTOSCOPY  2/18/2022    Procedure: CYSTOSCOPY;  Surgeon: Ronel Whittington MD;  Location: [facility] OR [address] [place] [address];  Service: Urology;;    CYSTOSCOPY W/ URETERAL STENT PLACEMENT Left 5/15/2021    Procedure:  CYSTOSCOPY, WITH URETERAL STENT INSERTION;  Surgeon: Levy Sánchez Jr., MD;  Location: [facility] OR [address] [place] [address];  Service: Urology;  Laterality: Left;    CYSTOSCOPY WITH BIOPSY OF BLADDER N/A 1/27/2020    Procedure: CYSTOSCOPY, WITH BLADDER BIOPSY, WITH FULGURATION IF INDICATED;  Surgeon: Ronel Whittington MD;  Location: [facility] OR [address] [place] [address];  Service: Urology;  Laterality: N/A;    DILATION AND CURETTAGE OF UTERUS      FISTULOGRAM N/A 4/29/2024    Procedure: Fistulogram;  Surgeon: RODRIGO Friedman III, MD;  Location: [facility] CATH LAB;  Service: Peripheral Vascular;  Laterality: N/A;    FISTULOGRAM Left 1/6/2025    Procedure: Fistulogram;  Surgeon: RODRIGO Friedman III, MD;  Location: [facility] CATH LAB;  Service: Peripheral Vascular;  Laterality: Left;    GALLBLADDER SURGERY  2006    HYSTERECTOMY      INJECTION FOR SENTINEL NODE IDENTIFICATION Left 8/1/2019    Procedure: INJECTION, FOR SENTINEL NODE IDENTIFICATION;  Surgeon: Samia Fulton MD;  Location: [facility] OR [address] [place] [address];  Service: General;  Laterality: Left;    INJECTION OF BOTULINUM TOXIN TYPE A N/A 10/8/2018    Procedure: INJECTION, BOTULINUM TOXIN, TYPE A 300 UNITS;  Surgeon: Ronel Whittington MD;  Location: [facility] OR [address] [place] [address];  Service: Urology;  Laterality: N/A;    INJECTION OF BOTULINUM TOXIN TYPE A N/A 3/25/2019    Procedure: INJECTION, BOTULINUM TOXIN, TYPE A 300 UNITS;  Surgeon: Ronel Whittington MD;  Location: [facility] OR [address] [place] [address];  Service: Urology;  Laterality: N/A;    INJECTION OF BOTULINUM TOXIN TYPE A N/A 8/26/2019    Procedure: INJECTION, BOTULINUM TOXIN, TYPE A 300 UNITS;  Surgeon: Ronel Whittington MD;  Location: [facility] OR [address] [place] [address];  Service: Urology;  Laterality: N/A;    MASTECTOMY Left 8/1/2019    Procedure: MASTECTOMY 23 hour stay;  Surgeon: Samia Fulton MD;  Location: [facility] OR [address] [place] [address];  Service: General;  Laterality: Left;    MEDIPORT REMOVAL Right 6/24/2022    Procedure: REMOVAL, CATHETER, CENTRAL VENOUS, TUNNELED, WITH PORT;  Surgeon: Isauro ZAMORANO  MD Justin;  Location: [facility] CATH LAB;  Service: Radiology;  Laterality: Right;    OVARIAN CYST SURGERY      PERCUTANEOUS TRANSLUMINAL ANGIOPLASTY OF ARTERIOVENOUS FISTULA Left 2024    Procedure: PTA, AV FISTULA;  Surgeon: RODRIGO Friedman III, MD;  Location: [facility] CATH LAB;  Service: Peripheral Vascular;  Laterality: Left;    REPLACEMENT OF STENT Left 2021    Procedure: REPLACEMENT, STENT;  Surgeon: Ronel Whittington MD;  Location: [facility] OR [facility] FLR;  Service: Urology;  Laterality: Left;    REPLACEMENT OF STENT Left 2022    Procedure: REPLACEMENT, STENT;  Surgeon: Ronel Whittington MD;  Location: [facility] OR [facility] FLR;  Service: Urology;  Laterality: Left;    RETROGRADE PYELOGRAPHY Left 2022    Procedure: PYELOGRAM, RETROGRADE;  Surgeon: Ronel Whittington MD;  [facility];  Service: Urology;  Laterality: Left;    SENTINEL LYMPH NODE BIOPSY Left 2019    Procedure: BIOPSY, LYMPH NODE, SENTINEL;  Surgeon: Samia Fulton MD;  Location: [facility] OR 2ND FLR;  Service: General;  Laterality: Left;    spt placement      TONSILLECTOMY, ADENOIDECTOMY      TOTAL ABDOMINAL HYSTERECTOMY W/ BILATERAL SALPINGOOPHORECTOMY      URETEROSCOPY Left 2022    Procedure: URETEROSCOPY;  Surgeon: Ronel Whittington MD;  [facility];  Service: Urology;  Laterality: Left;       Review of patient's allergies indicates:  No Known Allergies    Current Facility-Administered Medications on File Prior to Encounter   Medication    epoetin chloe injection 2,000 Units    [COMPLETED] epoetin chloe injection 4,000 Units    [] heparin (porcine) injection 1,000 Units    heparin (porcine) injection 2,000 Units    heparin (porcine) injection 2,000 Units    [COMPLETED] heparin (porcine) injection 2,000 Units    heparin (porcine) injection 4,000 Units    iron sucrose injection 100 mg    [COMPLETED] iron sucrose Soln 100 mg     Current Outpatient Medications on File Prior to Encounter  "  Medication Sig    albuterol (PROVENTIL/VENTOLIN HFA) 90 mcg/actuation inhaler Inhale 1-2 puffs into the lungs every 6 (six) hours as needed for Wheezing or Shortness of Breath. Rescue    amLODIPine (NORVASC) 10 MG tablet TAKE 1 TABLET BY MOUTH EVERY DAY    anastrozole (ARIMIDEX) 1 mg Tab TAKE 1 TABLET BY MOUTH EVERY DAY    BD INSULIN SYRINGE ULTRA-FINE 0.5 mL 31 gauge x 5/16" Syrg USE WITH INSULIN 4 TIMES A DAY    carvediloL (COREG) 12.5 MG tablet Take 1 tablet (12.5 mg total) by mouth 2 (two) times daily.    diclofenac sodium (VOLTAREN) 1 % Gel Apply 2 g topically 2 (two) times daily as needed (arthritis).    DULoxetine (CYMBALTA) 20 MG capsule TAKE 2 CAPSULES(40 MG) BY MOUTH DAILY    ELIQUIS 5 mg Tab TAKE 1 TABLET BY MOUTH TWICE A DAY    erenumab-aooe (AIMOVIG AUTOINJECTOR) 140 mg/mL autoinjector 140 mg MONTHLY (route: subcutaneous)    ergocalciferol (ERGOCALCIFEROL) 50,000 unit Cap Take 1 capsule (50,000 Units total) by mouth every 7 days.    furosemide (LASIX) 80 MG tablet Take 1 tablet (80 mg total) by mouth once daily.    galcanezumab-gnlm 120 mg/mL Syrg Inject 120 mg into the skin every 28 days. maintenance dose    HYDROcodone-acetaminophen (NORCO)  mg per tablet Take 1 tablet by mouth every 6 (six) hours as needed for Pain.    ipratropium (ATROVENT) 21 mcg (0.03 %) nasal spray USE 2 SPRAYS IN EACH NOSTRIL TWICE DAILY    lactulose (CHRONULAC) 10 gram/15 mL solution Take 20 g by mouth 2 (two) times daily as needed (constipation).    LANTUS SOLOSTAR U-100 INSULIN 100 unit/mL (3 mL) InPn pen INJECT 12-15 UNITS UNDER THE SKIN at night    losartan (COZAAR) 100 MG tablet TAKE 1 TABLET(100 MG) BY MOUTH DAILY    magnesium oxide (MAG-OX) 400 mg (241.3 mg magnesium) tablet TAKE 1 TABLET(400 MG) BY MOUTH DAILY    methocarbamoL (ROBAXIN) 750 MG Tab TAKE 1 TO 2 TABLETS(750 TO 1500 MG) BY MOUTH THREE TIMES DAILY AS NEEDED FOR MUSCLE SPASMS    oxybutynin (DITROPAN-XL) 10 MG 24 hr tablet TAKE 1 TABLET BY MOUTH EVERY " "DAY    oxyCODONE (ROXICODONE) 10 mg Tab immediate release tablet Take 1 tablet (10 mg total) by mouth daily as needed (Severe pain).    pen needle, diabetic (BD ULTRA-FINE SHORT PEN NEEDLE) 31 gauge x 5/16" Ndle USE WITH LANTUS DAILY, e 11.65    sodium bicarbonate 650 MG tablet Take 1 tablet (650 mg total) by mouth once daily.    sucroferric oxyhydroxide (VELPHORO) 500 mg Chew Take by mouth 3 (three) times daily with meals.    sumatriptan (IMITREX) 100 MG tablet Take 1 tablet (100 mg total) by mouth every 2 (two) hours as needed for Migraine (Limit 2 in 14 hours and 9 in one month).    SYNTHROID 50 mcg tablet TAKE 1 TABLET BY MOUTH BEFORE BREAKFAST. ADMINISTER ON AN EMPTY STOMACH AT LEAST 30 MINUTES BEFORE FOOD. IF RECEIVING TUBE FEEDS, HOLD TUBE FEEDS FOR 1 HOUR BEFORE AND AFTER LEVOTHYROXINE ADMINISTRATION.    traZODone (DESYREL) 100 MG tablet TAKE 1 TABLET BY MOUTH NIGHTLY AS NEEDED FOR INSOMNIA.     Family History       Problem Relation (Age of Onset)    ALS Mother    Cancer Maternal Grandmother, Paternal Grandfather, Brother    Diabetes Sister, Maternal Aunt, Maternal Uncle    Kidney disease Sister, Maternal Aunt    Prostate cancer Brother    Scoliosis Brother          Tobacco Use    Smoking status: Never    Smokeless tobacco: Never   Substance and Sexual Activity    Alcohol use: No     Alcohol/week: 0.0 standard drinks of alcohol    Drug use: No    Sexual activity: Not Currently     Birth control/protection: Abstinence     Review of Systems   Constitutional:  Negative for appetite change, chills, diaphoresis, fatigue and fever.   HENT:  Negative for congestion, rhinorrhea and sore throat.    Eyes:  Negative for photophobia and visual disturbance.   Respiratory:  Positive for cough, shortness of breath and wheezing.    Cardiovascular:  Positive for leg swelling. Negative for chest pain and palpitations.   Gastrointestinal:  Negative for abdominal distention, abdominal pain, diarrhea, nausea and vomiting. "   Genitourinary:  Positive for decreased urine volume (ESRD on HD) and hematuria.   Musculoskeletal:  Positive for back pain (chronic) and gait problem (chronic, wheelchair bound). Negative for myalgias and neck pain.   Skin:  Negative for color change, pallor and rash.   Neurological:  Negative for syncope, light-headedness, numbness and headaches.   Psychiatric/Behavioral:  Negative for confusion and hallucinations. The patient is not nervous/anxious.      Objective:     Vital Signs (Most Recent):  Temp: 98.8 °F (37.1 °C) (06/09/25 1900)  Pulse: 80 (06/09/25 2000)  Resp: 20 (06/09/25 1900)  BP: 136/61 (06/09/25 2000)  SpO2: 98 % (06/09/25 2000) Vital Signs (24h Range):  Temp:  [98.6 °F (37 °C)-98.8 °F (37.1 °C)] 98.8 °F (37.1 °C)  Pulse:  [57-80] 80  Resp:  [18-20] 20  SpO2:  [95 %-100 %] 98 %  BP: (100-166)/(58-93) 136/61     Weight: 98 kg (216 lb)  Body mass index is 32.84 kg/m².     Physical Exam  Vitals and nursing note reviewed.   Constitutional:       General: She is not in acute distress.     Appearance: She is obese. She is not toxic-appearing or diaphoretic.      Comments: Chronically ill appearing   HENT:      Head: Normocephalic and atraumatic.      Nose: Nose normal.      Mouth/Throat:      Mouth: Mucous membranes are moist.   Eyes:      Pupils: Pupils are equal, round, and reactive to light.   Cardiovascular:      Rate and Rhythm: Normal rate and regular rhythm.      Pulses: Normal pulses.      Arteriovenous access: Left arteriovenous access is present.     Comments: +thrill  Pulmonary:      Effort: Pulmonary effort is normal. No respiratory distress.      Breath sounds: Examination of the right-upper field reveals wheezing. Examination of the left-upper field reveals wheezing. Examination of the right-lower field reveals decreased breath sounds. Examination of the left-lower field reveals decreased breath sounds. Decreased breath sounds and wheezing present. No rhonchi.   Abdominal:      General:  Bowel sounds are normal. There is no distension.      Palpations: Abdomen is soft.      Tenderness: There is no abdominal tenderness. There is no guarding.      Comments: Suprapubic catheter currently being exchanged by nursing   Musculoskeletal:         General: Normal range of motion.      Cervical back: Normal range of motion.      Right lower leg: Edema present.      Left lower leg: Edema present.      Comments: BLE edema, Right greater than left which is chronic per pt   Neurological:      Mental Status: She is alert.              CRANIAL NERVES     CN III, IV, VI   Pupils are equal, round, and reactive to light.       Significant Labs: All pertinent labs within the past 24 hours have been reviewed.  CBC:   Recent Labs   Lab 06/09/25 1756   WBC 7.79   HGB 10.6*   HCT 34.0*        CMP:   Recent Labs   Lab 06/09/25 1756   *   K 4.0      CO2 22*   *   BUN 14   CREATININE 1.8*   CALCIUM 8.4*   PROT 7.2   ALBUMIN 3.0*   BILITOT 0.3   ALKPHOS 153*   AST 29   ALT 13   ANIONGAP 9     Cardiac Markers:   Recent Labs   Lab 06/09/25  1756   *     Troponin:   Recent Labs   Lab 06/09/25  1756   TROPONINIHS 14     Urine Studies:   Recent Labs   Lab 06/09/25  1908   COLORU Brown*   APPEARANCEUA Cloudy*   PHUR 8.0   SPECGRAV 1.020   PROTEINUA 3+*   GLUCUA Negative   BILIRUBINUA Negative   OCCULTUA 3+*   NITRITE Negative   UROBILINOGEN Negative   LEUKOCYTESUR 3+*   RBCUA >100*   WBCUA >100*   BACTERIA Many*   HYALINECASTS 0       Significant Imaging: I have reviewed all pertinent imaging results/findings within the past 24 hours.  Imaging Results              X-Ray Chest AP Portable (Final result)  Result time 06/09/25 19:44:26      Final result by Saul Garcia MD (06/09/25 19:44:26)                   Impression:      1. Pulmonary findings suggest edema, bronchial airway inflammation or infection not excluded.  No large focal consolidation.      Electronically signed by: Saul Garcia  MD  Date:    06/09/2025  Time:    19:44               Narrative:    EXAMINATION:  XR CHEST AP PORTABLE    CLINICAL HISTORY:  Shortness of breath    TECHNIQUE:  Single frontal view of the chest was performed.    COMPARISON:  05/17/2025    FINDINGS:  The cardiomediastinal silhouette is not enlarged noting calcification of the aorta.  There is elevation of the right hemidiaphragm..  There is no pleural effusion.  The trachea is midline.  The lungs are symmetrically expanded bilaterally with coarse interstitial attenuation, similar to the previous exam.  There is mild bilateral peribronchial thickening..  No large focal consolidation seen.  There is no pneumothorax.  The osseous structures are remarkable for degenerative change..                                      Assessment/Plan:     Assessment & Plan  Shortness of breath  Pt reports shortness of breath and productive cough x6-7 days. Intermittent wheezing. Denies rhinorrhea, sore throat, fever, or chills. Possible bronchitis or undiagnosed COPD or hypervolemia; pt reports she has never been a smoker but notes her father and brother that she previously lived with did smoke.  -Afebrile, no leukocytosis.  -  -CXR with pulmonary findings suggest edema, bronchial airway inflammation or infection not excluded. No large focal consolidation.   -CT chest pending.  -Sputum cx pending.  -Flu/COVID negative.  -Duo nebs q6h while awake and PRN.   -Will start mucinex DM and prednisone 40mg qd  -PFTs ordered outpatient but have not been completed.  Hematuria  Chronic suprapubic catheter  -Pt reports hematuria starting today. Denies abdominal pain or fever/chills.  -Afebrile, no leukocytosis; 0/4 SIRS  -Initial UA obtained from chronic suprapubic murillo, concern for contaminated sample given catheter has been in place for almost 4 weeks.  -Nursing exchanged suprapubic catheter 6/9.  -Repeat UA pending.  -Will review UA and consider possible abx.  -Suprapubic catheter care per  nursing.  Hypothyroidism  Patient has chronic hypothyroidism. TFTs reviewed-   Lab Results   Component Value Date    TSH 1.631 12/31/2024   . Will continue chronic levothyroxine and adjust for and clinical changes.  Hyperlipidemia   Patient is not chronically on statin.Monitor clinically. Last LDL was   Lab Results   Component Value Date    LDLCALC 85 04/22/2024     VIJAYA (obstructive sleep apnea)  -Reports history of PTSD so unable to tolerate CPAP.  Major depressive disorder, recurrent, moderate  Patient has recurrent depression which is moderate and is currently controlled. Will Continue anti-depressant medications. We will not consult psychiatry at this time. Patient does not display psychosis at this time. Continue to monitor closely and adjust plan of care as needed.  -Continue home cymbalta and trazodone.  Insomnia  -Chronic, stable.  -Continue trazodone.  Class 1 obesity due to excess calories with serious comorbidity in adult  Body mass index is 32.84 kg/m². Obesity complicates all aspects of disease management from diagnostic modalities to treatment. Weight loss encouraged and health benefits explained to patient.   Chronic daily headache  -Chronic issue.   -Continue PRN tylenol and sumatriptan.  Chronic pain  Fibromyalgia  -Chronic, controlled.  - reviewed, continue home norco, oxy and robaxin prn  Malignant neoplasm of left breast, estrogen receptor positive  Prophylactic use of anastrozole (Arimidex)  Grade I invasive ductal carcinoma ER+, CO+ s/p radical mastectomy with sentinel lymph node bx   -Follows with oncology  -Continue anastrazole  Anemia in ESRD (end-stage renal disease)  Anemia is likely due to chronic disease due to ESRD. Most recent hemoglobin and hematocrit are listed below.  Recent Labs     06/09/25  1756   HGB 10.6*   HCT 34.0*     Plan  - Monitor serial CBC: Daily  - Transfuse PRBC if patient becomes hemodynamically unstable, symptomatic or H/H drops below 7/21.  - Patient has not  "received any PRBC transfusions to date  - Patient's anemia is currently stable  ESRD (end stage renal disease) on dialysis  MWF schedule, last dialyzed 6/9  Residual renal function?- Yes    - Nephrology consulted  - Continue chronic hemodialysis  - Monitor daily electrolytes and defer dialysis orders to nephrology  - Renally dose medications  - Renal diet  - Continue phosphorus binders  - Daily weights/strict I&Os  - 1.5L FR  Primary hypertension  Patient's blood pressure range in the last 24 hours was: BP  Min: 100/63  Max: 166/74.The patient's inpatient anti-hypertensive regimen is listed below:  Current Antihypertensives  amLODIPine tablet 10 mg, Daily, Oral  carvediloL tablet 12.5 mg, 2 times daily, Oral  furosemide tablet 80 mg, Daily, Oral  losartan tablet 100 mg, Daily, Oral    Plan  - BP is controlled, no changes needed to their regimen  Type 2 diabetes mellitus with kidney complication, with long-term current use of insulin  Patient's FSGs are controlled on current medication regimen.  Last A1c reviewed-   Lab Results   Component Value Date    HGBA1C 5.4 04/16/2025     Most recent fingerstick glucose reviewed- No results for input(s): "POCTGLUCOSE" in the last 24 hours.  Current correctional scale  Low  Maintain anti-hyperglycemic dose as follows-   Antihyperglycemics (From admission, onward)      Start     Stop Route Frequency Ordered    06/09/25 2215  insulin glargine U-100 (Lantus) pen 5 Units         -- SubQ Nightly 06/09/25 2110 06/09/25 2136  insulin aspart U-100 pen 0-5 Units         -- SubQ Before meals & nightly PRN 06/09/25 2037          Hold Oral hypoglycemics while patient is in the hospital.  -Accuchecks AC/HS  Unspecified atrial fibrillation  Patient has paroxysmal (<7 days) atrial fibrillation. Patient is currently in sinus rhythm. [id number] Score: 3. The patients heart rate in the last 24 hours is as follows:  Pulse  Min: 57  Max: 81     Antiarrhythmics   coreg 12.5mg, bid, " oral    Anticoagulants  apixaban tablet 5 mg, 2 times daily, Oral    Plan  - Replete lytes with a goal of K>4, Mg >2  - Patient is anticoagulated, see medications listed above.  - Patient's afib is currently controlled  VTE Risk Mitigation (From admission, onward)           Ordered     apixaban tablet 5 mg  2 times daily         06/09/25 2037     IP VTE HIGH RISK PATIENT  Once         06/09/25 2037     Place sequential compression device  Until discontinued         06/09/25 2037                         On 06/09/2025, patient should be placed in hospital observation services under my care in collaboration with Reece Newby MD.           Jocelyne Acevedo, NP  Department of Hospital Medicine  [facility] - Emergency Dept

## 2025-06-10 NOTE — ASSESSMENT & PLAN NOTE
Patient has paroxysmal (<7 days) atrial fibrillation. Patient is currently in sinus rhythm. [id number] Score: 3. The patients heart rate in the last 24 hours is as follows:  Pulse  Min: 66  Max: 87     Antiarrhythmics   coreg 12.5mg, bid, oral    Anticoagulants  apixaban tablet 5 mg, 2 times daily, Oral    Plan  - Replete lytes with a goal of K>4, Mg >2  - Patient is anticoagulated, see medications listed above.  - Patient's afib is currently controlled

## 2025-06-10 NOTE — ED NOTES
Per Dr. Ventura, no need to change suprapubic catheter at this time. Okay to take urine sample from existing murillo.

## 2025-06-10 NOTE — ASSESSMENT & PLAN NOTE
Pt reports shortness of breath and productive cough x6-7 days. Intermittent wheezing. Denies rhinorrhea, sore throat, fever, or chills. Possible bronchitis or undiagnosed COPD or hypervolemia; pt reports she has never been a smoker but notes her father and brother that she previously lived with did smoke.  -Afebrile, no leukocytosis.  -  -CXR with pulmonary findings suggest edema, bronchial airway inflammation or infection not excluded. No large focal consolidation.   -CT chest with scatter groudn glass opacities, enlargement of pulmonary trunk  -Sputum cx pending.  -Flu/COVID negative.  -Duo nebs q6h while awake and PRN.   -Will start mucinex DM and prednisone 40mg qd  -PFTs ordered outpatient but have not been completed.

## 2025-06-10 NOTE — ASSESSMENT & PLAN NOTE
Patient has paroxysmal (<7 days) atrial fibrillation. Patient is currently in sinus rhythm. [id number] Score: 3. The patients heart rate in the last 24 hours is as follows:  Pulse  Min: 57  Max: 81     Antiarrhythmics   coreg 12.5mg, bid, oral    Anticoagulants  apixaban tablet 5 mg, 2 times daily, Oral    Plan  - Replete lytes with a goal of K>4, Mg >2  - Patient is anticoagulated, see medications listed above.  - Patient's afib is currently controlled

## 2025-06-10 NOTE — CONSULTS
"[facility]  Nephrology  Consult Note    Patient Name: Cecile Bowen  MRN: [id number]  Admission Date: 6/9/2025  Hospital Length of Stay: 0 days  Attending Provider: Sabina Carranza MD   Primary Care Physician: Lurdes Navarro MD  Principal Problem:Shortness of breath    Inpatient consult to Nephrology  Consult performed by: Lamar Negro DNP  Consult ordered by: Jocelyne Acevedo, NP  Reason for consult: ESRD        Subjective:     HPI: Cecile Bowen is a 74 y.o. female with a PMHx of HTN, HLD, hypothyroidism, DM2, migraines, anemia, fibromyalgia, insomnia, depression, chronic pain, VIJAYA, urinary retention s/p suprapubic catheter placement, and ESRD on HD (MWF) who presents for evaluation of hematuria, shortness of breath, and cough. She reports shortness of breath, intermittent wheezing, and cough productive of "greyish" sputum x6-7 days. Notes she has had this issue several times in the past and thought she may have some extra fluid. She went to her dialysis appointment  yesterday and had a 4 hour session completed. EDW 98 kg. Nephrology consulted for ESRD.     HPI obtained via EMR and patient interview       Past Medical History:   Diagnosis Date    Bacteremia due to Enterococcus 09/28/2021    Catheter-associated urinary tract infection 09/29/2021    Cervicogenic migraine     Chronic pain     CKD (chronic kidney disease) stage 4, GFR 15-29 ml/min     [facility]    CKD (chronic kidney disease) stage 4, GFR 15-29 ml/min     Diabetes mellitus     Long term use of Insulin, Diabetic Neuropathy    Dialysis patient 01/21/2022    Fibromyalgia     Hydronephrosis     Hyperlipidemia     Hypertension 12/12/2012    Hypothyroidism 12/12/2012    ARON (iron deficiency anemia)     Insomnia     Levoscoliosis     Malignant neoplasm of upper-outer quadrant of left breast in female, estrogen receptor positive     Metabolic acidosis     Mobility impaired     Nuclear sclerosis - Both Eyes 03/24/2014 "    VIJAYA (obstructive sleep apnea)     Osteopenia     Pulmonary nodule     Recurrent UTI     Renal manifestation of secondary diabetes mellitus     Secondary hyperparathyroidism, renal     Urinary retention     Urinary tract infection due to ESBL Klebsiella        Past Surgical History:   Procedure Laterality Date    ANGIOGRAPHY OF UPPER EXTREMITY N/A 9/19/2024    Procedure: Angiogram Extremity Bilateral;  Surgeon: RODRIGO Friedman III, MD;  Location: [facility] OR 2ND FLR;  Service: Vascular;  Laterality: N/A;  R femoral approach, arch & arm angiogram  168.76mgy  33.1145 gycm2  8.9min    AV FISTULA PLACEMENT Left 2/14/2024    Procedure: CREATION, AV FISTULA;  Surgeon: RODRIGO Friedman III, MD;  Location: [facility] OR 2ND FLR;  Service: Vascular;  Laterality: Left;  LUE AVF creation vs AVG insertion    BIOPSY OF URETER Left 2/18/2022    Procedure: BIOPSY, URETER;  Surgeon: Ronel Whittington MD;  Location: [facility] OR [address] [place] [address];  Service: Urology;  Laterality: Left;    BREAST BIOPSY Right     benign    CHOLECYSTECTOMY      COLONOSCOPY N/A 1/13/2017    Procedure: COLONOSCOPY;  Surgeon: Morris Wiseman MD;  Location: [facility] ENDO (4TH FLR);  Service: Endoscopy;  Laterality: N/A;  Renal pt Nephrology advised to avoid phosphate preps    COLONOSCOPY N/A 12/7/2023    Procedure: COLONOSCOPY;  Surgeon: Herve Allen MD;  Location: [facility] ENDO (2ND FLR);  Service: Endoscopy;  Laterality: N/A;  HD MWF; labs prior  suprapubic catheter  pt does not ambulate-uses christina lift- will have sling with her  9/7 ref. by Anastasiia Campbell, , PEG, instr. mailed, Eliquis hold approval pending-[facility] to hold Eliquis 2 days per Dr Navarro-GT  1/30-precall complete-MS    CYSTOSCOPY N/A 10/8/2018    Procedure: CYSTOSCOPY;  Surgeon: Ronel Whittington MD;  Location: [facility] OR [address] [place] [address];  Service: Urology;  Laterality: N/A;  45 min    CYSTOSCOPY N/A 3/25/2019    Procedure: CYSTOSCOPY;  Surgeon: Ronel Whittington MD;  Location: [facility] OR [address] [place] [address];   Service: Urology;  Laterality: N/A;  45 min    CYSTOSCOPY N/A 8/26/2019    Procedure: CYSTOSCOPY;  Surgeon: Ronel Whittington MD;  [facility];  Service: Urology;  Laterality: N/A;  45 min    CYSTOSCOPY N/A 7/2/2021    Procedure: CYSTOSCOPY;  Surgeon: Ronel Whittington MD;  [facility];  Service: Urology;  Laterality: N/A;    CYSTOSCOPY  12/23/2021    Procedure: CYSTOSCOPY;  Surgeon: Ronel Whittington MD;  [facility];  Service: Urology;;    CYSTOSCOPY  2/18/2022    Procedure: CYSTOSCOPY;  Surgeon: Ronel Whittington MD;  Location: [facility] OR [address] [place] [address];  Service: Urology;;    CYSTOSCOPY W/ URETERAL STENT PLACEMENT Left 5/15/2021    Procedure: CYSTOSCOPY, WITH URETERAL STENT INSERTION;  Surgeon: Levy Sánchez Jr., MD;  Location: [facility] OR [address] [place] [address];  Service: Urology;  Laterality: Left;    CYSTOSCOPY WITH BIOPSY OF BLADDER N/A 1/27/2020    Procedure: CYSTOSCOPY, WITH BLADDER BIOPSY, WITH FULGURATION IF INDICATED;  Surgeon: Ronel Whittington MD;  Location: [address];  Service: Urology;  Laterality: N/A;    DILATION AND CURETTAGE OF UTERUS      FISTULOGRAM N/A 4/29/2024    Procedure: Fistulogram;  Surgeon: RODRIGO Friedman III, MD;  Location: [facility] CATH LAB;  Service: Peripheral Vascular;  Laterality: N/A;    FISTULOGRAM Left 1/6/2025    Procedure: Fistulogram;  Surgeon: RODRIGO Friedman III, MD;  Location: [facility] CATH LAB;  Service: Peripheral Vascular;  Laterality: Left;    GALLBLADDER SURGERY  2006    HYSTERECTOMY      INJECTION FOR SENTINEL NODE IDENTIFICATION Left 8/1/2019    Procedure: INJECTION, FOR SENTINEL NODE IDENTIFICATION;  Surgeon: Samia Fulton MD;  Location: [facility] OR 2ND [facility];  Service: General;  Laterality: Left;    INJECTION OF BOTULINUM TOXIN TYPE A N/A 10/8/2018    Procedure: INJECTION, BOTULINUM TOXIN, TYPE A 300 UNITS;  Surgeon: Ronel Whittington MD;  Location: [facility] OR [address] [place] [address];  Service: Urology;  Laterality: N/A;    INJECTION OF  BOTULINUM TOXIN TYPE A N/A 3/25/2019    Procedure: INJECTION, BOTULINUM TOXIN, TYPE A 300 UNITS;  Surgeon: Ronel Whittington MD;  Location: [facility] OR [address] [place] [address];  Service: Urology;  Laterality: N/A;    INJECTION OF BOTULINUM TOXIN TYPE A N/A 8/26/2019    Procedure: INJECTION, BOTULINUM TOXIN, TYPE A 300 UNITS;  Surgeon: Ronel Whittington MD;  Location: [facility] OR [address] [place] [address];  Service: Urology;  Laterality: N/A;    MASTECTOMY Left 8/1/2019    Procedure: MASTECTOMY 23 hour stay;  Surgeon: Samia Fulton MD;  Location: [facility] OR 2ND FLR;  Service: General;  Laterality: Left;    MEDIPORT REMOVAL Right 6/24/2022    Procedure: REMOVAL, CATHETER, CENTRAL VENOUS, TUNNELED, WITH PORT;  Surgeon: Isauro Anderson MD;  Location: [facility] CATH LAB;  Service: Radiology;  Laterality: Right;    OVARIAN CYST SURGERY  1985    PERCUTANEOUS TRANSLUMINAL ANGIOPLASTY OF ARTERIOVENOUS FISTULA Left 4/29/2024    Procedure: PTA, AV FISTULA;  Surgeon: RODRIGO Friedman III, MD;  Location: [facility] CATH LAB;  Service: Peripheral Vascular;  Laterality: Left;    REPLACEMENT OF STENT Left 12/23/2021    Procedure: REPLACEMENT, STENT;  Surgeon: Ronel Whittington MD;  [facility];  Service: Urology;  Laterality: Left;    REPLACEMENT OF STENT Left 2/18/2022    Procedure: REPLACEMENT, STENT;  Surgeon: Ronel Whittington MD;  Location: [facility] OR [address] [place] [address];  Service: Urology;  Laterality: Left;    RETROGRADE PYELOGRAPHY Left 2/18/2022    Procedure: PYELOGRAM, RETROGRADE;  Surgeon: Ronel Whittington MD;  Location: [facility] OR [address] [place] [address];  Service: Urology;  Laterality: Left;    SENTINEL LYMPH NODE BIOPSY Left 8/1/2019    Procedure: BIOPSY, LYMPH NODE, SENTINEL;  Surgeon: Samia Fulton MD;  [facility];  Service: General;  Laterality: Left;    spt placement      TONSILLECTOMY, ADENOIDECTOMY      TOTAL ABDOMINAL HYSTERECTOMY W/ BILATERAL SALPINGOOPHORECTOMY  1985    URETEROSCOPY Left 2/18/2022    Procedure: URETEROSCOPY;  Surgeon:  Ronel Whittington MD;  [facility] [address] [place] [address];  Service: Urology;  Laterality: Left;       Review of patient's allergies indicates:  No Known Allergies  Current Facility-Administered Medications   Medication Frequency    acetaminophen tablet 650 mg Q6H PRN    albuterol-ipratropium 2.5 mg-0.5 mg/3 mL nebulizer solution 3 mL Q6H WAKE    amLODIPine tablet 10 mg Daily    anastrozole tablet 1 mg Daily    apixaban tablet 5 mg BID    carvediloL tablet 12.5 mg BID    dextromethorphan-guaiFENesin  mg per 12 hr tablet 1 tablet BID    dextrose 50% injection 12.5 g PRN    dextrose 50% injection 25 g PRN    DULoxetine DR capsule 40 mg Daily    furosemide tablet 80 mg Daily    gentamicin (GARAMYCIN) 155.2 mg in 0.9% NaCl 100 mL IVPB Once    gentamicin - pharmacy to dose pharmacy to manage frequency    glucagon (human recombinant) injection 1 mg PRN    glucose chewable tablet 16 g PRN    glucose chewable tablet 24 g PRN    guaiFENesin 100 mg/5 ml syrup 200 mg Q4H PRN    HYDROcodone-acetaminophen  mg per tablet 1 tablet Q6H PRN    insulin aspart U-100 pen 0-5 Units QID (AC + HS) PRN    insulin glargine U-100 (Lantus) pen 5 Units QHS    lactulose 20 gram/30 mL solution Soln 20 g BID PRN    levothyroxine tablet 50 mcg Before breakfast    losartan tablet 100 mg Daily    methocarbamoL tablet 750 mg TID PRN    naloxone 0.4 mg/mL injection 0.02 mg PRN    ondansetron injection 4 mg Q8H PRN    oxybutynin 24 hr tablet 10 mg Daily    oxyCODONE immediate release tablet Tab 10 mg Daily PRN    polyethylene glycol packet 17 g Daily    predniSONE tablet 40 mg Daily    senna-docusate 8.6-50 mg per tablet 1 tablet BID    sodium chloride 0.9% flush 10 mL Q12H PRN    traZODone tablet 100 mg QHS     Facility-Administered Medications Ordered in Other Encounters   Medication Frequency    epoetin chloe injection 2,000 Units 1 time in Clinic/HOD    heparin (porcine) injection 2,000 Units Once    heparin (porcine) injection 2,000 Units  Once    heparin (porcine) injection 4,000 Units 1 time in Clinic/HOD    iron sucrose injection 100 mg 1 time in Clinic/HOD     Family History       Problem Relation (Age of Onset)    ALS Mother    Cancer Maternal Grandmother, Paternal Grandfather, Brother    Diabetes Sister, Maternal Aunt, Maternal Uncle    Kidney disease Sister, Maternal Aunt    Prostate cancer Brother    Scoliosis Brother          Tobacco Use    Smoking status: Never     Passive exposure: Past    Smokeless tobacco: Never   Substance and Sexual Activity    Alcohol use: No     Alcohol/week: 0.0 standard drinks of alcohol    Drug use: No    Sexual activity: Not Currently     Birth control/protection: Abstinence     Review of Systems   Constitutional:  Negative for chills, diaphoresis, fatigue and fever.   Respiratory:  Positive for cough. Negative for shortness of breath and wheezing.    Cardiovascular:  Positive for leg swelling. Negative for chest pain.   Gastrointestinal:  Negative for diarrhea and nausea.   Genitourinary:  Positive for decreased urine volume.   Neurological:  Negative for weakness.   Objective:     Vital Signs (Most Recent):  Temp: 98.2 °F (36.8 °C) (06/10/25 1030)  Pulse: 87 (06/10/25 1030)  Resp: 18 (06/10/25 1030)  BP: (!) 172/82 (06/10/25 1030)  SpO2: 97 % (06/10/25 1030) Vital Signs (24h Range):  Temp:  [97.4 °F (36.3 °C)-98.8 °F (37.1 °C)] 98.2 °F (36.8 °C)  Pulse:  [57-87] 87  Resp:  [16-28] 18  SpO2:  [94 %-100 %] 97 %  BP: (100-172)/(58-93) 172/82     Weight: 98 kg (216 lb 0.8 oz) (06/10/25 0555)  Body mass index is 32.85 kg/m².  Body surface area is 2.17 meters squared.    I/O last 3 completed shifts:  In: 420 [P.O.:420]  Out: 70 [Urine:70]     Physical Exam  Vitals and nursing note reviewed.   Constitutional:       Appearance: She is obese.   HENT:      Mouth/Throat:      Pharynx: Oropharynx is clear.   Cardiovascular:      Rate and Rhythm: Normal rate.      Pulses: Normal pulses.   Pulmonary:      Effort: Pulmonary  effort is normal. No respiratory distress.      Breath sounds: No wheezing.   Abdominal:      Palpations: Abdomen is soft.   Musculoskeletal:      Right lower leg: No edema.      Left lower leg: No edema.   Skin:     General: Skin is dry.   Neurological:      Mental Status: She is alert and oriented to person, place, and time.   Psychiatric:         Mood and Affect: Mood normal.        Significant Labs:  CBC:   Recent Labs   Lab 06/10/25  0731   WBC 5.45   RBC 3.87*   HGB 11.1*   HCT 36.9*      MCV 95   MCH 28.7   MCHC 30.1*     CMP:   Recent Labs   Lab 06/10/25  0731   *   CALCIUM 8.3*   ALBUMIN 3.0*   PROT 6.9   *   K 3.9   CO2 20*   CL 99   BUN 19   CREATININE 2.4*   ALKPHOS 150   ALT 11   AST 13   BILITOT 0.2     All labs within the past 24 hours have been reviewed.      Assessment/Plan:     Pulmonary  * Shortness of breath  - per primary     Renal/  ESRD (end stage renal disease) on dialysis  Nephrology History  iHD Schedule: MF  Unit/MD: [facility]/Dr. Hare   Duration: 4 hours   EDW: 98 kg.   Access: JENNY AVF   Residual Renal Function: present   Last HD prior to presentation: 6/9/25        Plan/Recommendations:      - No urgent indication for RRT. Next HD tomorrow 6/11  - Hgb goal 10-11, at goal  - Hyponatremia in setting of ESRD, 1L fluid restrictions. UF with HD tomorrow   - continue lasix   - continue sevelamer        Thank you for your consult. I will follow-up with patient. Please contact us if you have any additional questions.    Lamar Negro DNP  Nephrology  [facility]

## 2025-06-10 NOTE — ASSESSMENT & PLAN NOTE
Patient's blood pressure range in the last 24 hours was: BP  Min: 100/63  Max: 166/74.The patient's inpatient anti-hypertensive regimen is listed below:  Current Antihypertensives  amLODIPine tablet 10 mg, Daily, Oral  carvediloL tablet 12.5 mg, 2 times daily, Oral  furosemide tablet 80 mg, Daily, Oral  losartan tablet 100 mg, Daily, Oral    Plan  - BP is controlled, no changes needed to their regimen

## 2025-06-10 NOTE — ASSESSMENT & PLAN NOTE
-Pt reports hematuria starting today. Denies abdominal pain or fever/chills.  -Afebrile, no leukocytosis; 0/4 SIRS  -Initial UA obtained from chronic suprapubic murillo, concern for contaminated sample given catheter has been in place for almost 4 weeks.  -Nursing exchanged suprapubic catheter 6/9.  -Repeat UA pending.  -Will review UA and consider possible abx.  -Suprapubic catheter care per nursing.

## 2025-06-10 NOTE — ASSESSMENT & PLAN NOTE
Patient's blood pressure range in the last 24 hours was: BP  Min: 100/63  Max: 172/82.The patient's inpatient anti-hypertensive regimen is listed below:  Current Antihypertensives  amLODIPine tablet 10 mg, Daily, Oral  carvediloL tablet 12.5 mg, 2 times daily, Oral  furosemide tablet 80 mg, Daily, Oral  losartan tablet 100 mg, Daily, Oral    Plan  - BP is controlled, no changes needed to their regimen

## 2025-06-10 NOTE — ASSESSMENT & PLAN NOTE
Grade I invasive ductal carcinoma ER+, IN+ s/p radical mastectomy with sentinel lymph node bx   -Follows with oncology  -Continue anastrazole

## 2025-06-10 NOTE — ASSESSMENT & PLAN NOTE
Nephrology History  iHD Schedule: MF  Unit/MD: [facility]/Dr. Hare   Duration: 4 hours   EDW: 98 kg.   Access: JENNY AVF   Residual Renal Function: present   Last HD prior to presentation: 6/9/25        Plan/Recommendations:      - No urgent indication for RRT. Next HD tomorrow 6/11  - Hgb goal 10-11, at goal  - Hyponatremia in setting of ESRD, 1L fluid restrictions. UF with HD tomorrow   - continue sevelamer

## 2025-06-10 NOTE — PROGRESS NOTES
Pharmacokinetic Initial Assessment: Gentamicin    Assessment:  Weight utilized for dose calculation: Adjusted Body Weight  Dosing method utilized: Dosing by random level  Patient history of   ESRD on HD MWF  Last HD 6/9/25     Plan:   Traditional dosing regimen: Gentamicin 155.2 mg  mg IV once (2mg/kg)   Re dose after HD when gentamicin levels is <1 mg/L  Level to be drawn 2 hrs post dialysis on 6/11 to determine  re dosing time frame    Pharmacy will continue to monitor.    Please contact pharmacy at extension [phone] with any questions regarding this assessment.    Thank you for the consult,    Jovana Jones       Patient brief summary:  Cecile Bowen is a 74 y.o. female initiated on aminoglycoside therapy for treatment of suspected urinary tract infection    Drug Allergies:   Review of patient's allergies indicates:  No Known Allergies    Actual Body Weight:   98 kg    Adjust Body Weight:   77.5 kg    Ideal Body Weight:  63.9 kg     Renal Function:   Estimated Creatinine Clearance: 25.9 mL/min (A) (based on SCr of 2.4 mg/dL (H)).,     Dialysis Method (if applicable):  intermittent HD M/W/F    CBC (last 72 hours):  Recent Labs   Lab Result Units 06/09/25  1756 06/10/25  0731   WBC K/uL 7.79 5.45   HGB gm/dL 10.6* 11.1*   HCT % 34.0* 36.9*   Platelet Count K/uL 206 198   Lymph % % 19.1 11.6*   Mono % % 10.3 1.5*   Eos % % 9.5* 0.0   Basophil % % 0.6 0.4       Metabolic Panel (last 72 hours):  Recent Labs   Lab Result Units 06/09/25  1756 06/09/25  1908 06/10/25  0238 06/10/25  0731   Sodium mmol/L 133*  --   --  130*   Potassium mmol/L 4.0  --   --  3.9   Chloride mmol/L 102  --   --  99   CO2 mmol/L 22*  --   --  20*   Glucose mg/dL 120*  --   --  176*   Glucose, UA   --  Negative 2+*  --    BUN mg/dL 14  --   --  19   Creatinine mg/dL 1.8*  --   --  2.4*   Albumin g/dL 3.0*  --   --  3.0*   Bilirubin Total mg/dL 0.3  --   --  0.2   ALP unit/L 153*  --   --  150   AST unit/L 29  --   --  13   ALT unit/L 13  --    --  11   Magnesium  mg/dL 2.0  --   --  2.0   Phosphorus Level mg/dL 3.5  --   --  4.4       Microbiologic Results:  Microbiology Results (last 7 days)       Procedure Component Value Units Date/Time    Urine culture [[id number]] Collected: 06/10/25 0238    Order Status: Sent Specimen: Urine, Supra Pubic Updated: 06/10/25 0431    Culture, Respiratory with Gram Stain [[id number]]     Order Status: Sent Specimen: Respiratory     Urine culture [[id number]] Collected: 06/09/25 1908    Order Status: Sent Specimen: Urine Updated: 06/09/25 1932    Influenza A & B by Molecular [[id number]]  (Normal) Collected: 06/09/25 1807    Order Status: Completed Specimen: Nasopharyngeal Swab Updated: 06/09/25 1849     INFLUENZA A MOLECULAR Negative     INFLUENZA B MOLECULAR  Negative

## 2025-06-10 NOTE — TELEPHONE ENCOUNTER
Copied from CRM #[id number]. Topic: General Inquiry - Patient Advice  >> Toni 10, 2025 10:13 AM Michelle wrote:  CONSULT/ADVISORY    Name of Caller: pt    Contact Preference: [phone]      Nature of Call:  Pt has an appt for today 06/10/2025 and want to tell Dr. Whittington that she has been admitted into the hospital.  She is in room 730.  Thank you

## 2025-06-10 NOTE — ASSESSMENT & PLAN NOTE
Patient is not chronically on statin.Monitor clinically. Last LDL was   Lab Results   Component Value Date    LDLCALC 85 04/22/2024

## 2025-06-10 NOTE — ASSESSMENT & PLAN NOTE
"Patient's FSGs are controlled on current medication regimen.  Last A1c reviewed-   Lab Results   Component Value Date    HGBA1C 5.4 04/16/2025     Most recent fingerstick glucose reviewed- No results for input(s): "POCTGLUCOSE" in the last 24 hours.  Current correctional scale  Low  Maintain anti-hyperglycemic dose as follows-   Antihyperglycemics (From admission, onward)      Start     Stop Route Frequency Ordered    06/09/25 2215  insulin glargine U-100 (Lantus) pen 5 Units         -- SubQ Nightly 06/09/25 2110 06/09/25 2136  insulin aspart U-100 pen 0-5 Units         -- SubQ Before meals & nightly PRN 06/09/25 2037          Hold Oral hypoglycemics while patient is in the hospital.  -Accuchecks AC/HS  "

## 2025-06-10 NOTE — PROGRESS NOTES
Pharmacokinetic Initial Assessment: Gentamicin    Assessment:  Weight utilized for dose calculation: Adjusted Body Weight  Dosing method utilized: extended interval dosing    Plan: Extended interval dosing regimen: Gentamicin 540 mg IV once, followed by a random level to be drawn on 6/10/25 at 2200, 8-12 hours after the first dose.    Pharmacy will continue to monitor.    Please contact pharmacy at extension [phone] with any questions regarding this assessment.    Thank you for the consult,    Jovana Jones       Patient brief summary:  Cecile Bowen is a 74 y.o. female initiated on aminoglycoside therapy for treatment of suspected urinary tract infection    Drug Allergies:   Review of patient's allergies indicates:  No Known Allergies    Actual Body Weight:   98 kg    Adjust Body Weight:   77.5 kg    Ideal Body Weight:  63.9 kg    Renal Function:   Estimated Creatinine Clearance: 25.9 mL/min (A) (based on SCr of 2.4 mg/dL (H)).,     Dialysis Method (if applicable):  N/A    CBC (last 72 hours):  Recent Labs   Lab Result Units 06/09/25  1756 06/10/25  0731   WBC K/uL 7.79 5.45   HGB gm/dL 10.6* 11.1*   HCT % 34.0* 36.9*   Platelet Count K/uL 206 198   Lymph % % 19.1 11.6*   Mono % % 10.3 1.5*   Eos % % 9.5* 0.0   Basophil % % 0.6 0.4       Metabolic Panel (last 72 hours):  Recent Labs   Lab Result Units 06/09/25  1756 06/09/25  1908 06/10/25  0238 06/10/25  0731   Sodium mmol/L 133*  --   --  130*   Potassium mmol/L 4.0  --   --  3.9   Chloride mmol/L 102  --   --  99   CO2 mmol/L 22*  --   --  20*   Glucose mg/dL 120*  --   --  176*   Glucose, UA   --  Negative 2+*  --    BUN mg/dL 14  --   --  19   Creatinine mg/dL 1.8*  --   --  2.4*   Albumin g/dL 3.0*  --   --  3.0*   Bilirubin Total mg/dL 0.3  --   --  0.2   ALP unit/L 153*  --   --  150   AST unit/L 29  --   --  13   ALT unit/L 13  --   --  11   Magnesium  mg/dL 2.0  --   --  2.0   Phosphorus Level mg/dL 3.5  --   --  4.4       Microbiologic  Results:  Microbiology Results (last 7 days)       Procedure Component Value Units Date/Time    Urine culture [[id number]] Collected: 06/10/25 0238    Order Status: Sent Specimen: Urine, Supra Pubic Updated: 06/10/25 0431    Culture, Respiratory with Gram Stain [[id number]]     Order Status: Sent Specimen: Respiratory     Urine culture [[id number]] Collected: 06/09/25 1908    Order Status: Sent Specimen: Urine Updated: 06/09/25 1932    Influenza A & B by Molecular [[id number]]  (Normal) Collected: 06/09/25 1807    Order Status: Completed Specimen: Nasopharyngeal Swab Updated: 06/09/25 1849     INFLUENZA A MOLECULAR Negative     INFLUENZA B MOLECULAR  Negative

## 2025-06-10 NOTE — ASSESSMENT & PLAN NOTE
Anemia is likely due to chronic disease due to ESRD. Most recent hemoglobin and hematocrit are listed below.  Recent Labs     06/09/25  1756   HGB 10.6*   HCT 34.0*     Plan  - Monitor serial CBC: Daily  - Transfuse PRBC if patient becomes hemodynamically unstable, symptomatic or H/H drops below 7/21.  - Patient has not received any PRBC transfusions to date  - Patient's anemia is currently stable

## 2025-06-10 NOTE — ASSESSMENT & PLAN NOTE
Grade I invasive ductal carcinoma ER+, MS+ s/p radical mastectomy with sentinel lymph node bx   -Follows with oncology  -Continue anastrazole

## 2025-06-10 NOTE — SUBJECTIVE & OBJECTIVE
Interval History: Patient examined at bedside who is resting comfortably. VSS, no acute distress. No overnight events reported. Patient reports mild improvement of SOB overnight. Still requiring supplemental O2 to maintain sats. Continue current regimen of lasix and HD. Patient with UTI, based on previous cultures started on gentamicin for now. Follow cultures. Suprapubic cath replaced in ED      Review of Systems   Constitutional:  Negative for chills and fever.   Respiratory:  Positive for cough and shortness of breath. Negative for chest tightness.    Cardiovascular:  Negative for chest pain and leg swelling.   Gastrointestinal:  Negative for abdominal pain and nausea.   Genitourinary:  Positive for hematuria.   Neurological:  Negative for dizziness and weakness.     Objective:     Vital Signs (Most Recent):  Temp: 98.2 °F (36.8 °C) (06/10/25 1030)  Pulse: 87 (06/10/25 1030)  Resp: 18 (06/10/25 1049)  BP: (!) 172/82 (06/10/25 1030)  SpO2: 97 % (06/10/25 1030) Vital Signs (24h Range):  Temp:  [97.4 °F (36.3 °C)-98.8 °F (37.1 °C)] 98.2 °F (36.8 °C)  Pulse:  [66-87] 87  Resp:  [16-28] 18  SpO2:  [94 %-100 %] 97 %  BP: (100-172)/(58-82) 172/82     Weight: 98 kg (216 lb 0.8 oz)  Body mass index is 32.85 kg/m².    Intake/Output Summary (Last 24 hours) at 6/10/2025 1309  Last data filed at 6/10/2025 0533  Gross per 24 hour   Intake 420 ml   Output 70 ml   Net 350 ml         Physical Exam  Vitals and nursing note reviewed.   Constitutional:       General: She is not in acute distress.     Appearance: Normal appearance.   HENT:      Head: Normocephalic and atraumatic.   Cardiovascular:      Rate and Rhythm: Normal rate and regular rhythm.      Heart sounds: Normal heart sounds. No murmur heard.  Pulmonary:      Effort: Pulmonary effort is normal. No respiratory distress.      Breath sounds: Normal breath sounds.      Comments: On 2L NC  Abdominal:      General: Abdomen is flat.      Tenderness: There is no abdominal  tenderness.   Musculoskeletal:         General: Normal range of motion.      Right lower leg: No edema.      Left lower leg: No edema.   Skin:     General: Skin is warm and dry.   Neurological:      General: No focal deficit present.      Mental Status: She is alert. Mental status is at baseline.   Psychiatric:         Mood and Affect: Mood normal.         Behavior: Behavior normal.               Significant Labs: All pertinent labs within the past 24 hours have been reviewed.  CBC:   Recent Labs   Lab 06/09/25  1756 06/10/25  0731   WBC 7.79 5.45   HGB 10.6* 11.1*   HCT 34.0* 36.9*    198     CMP:   Recent Labs   Lab 06/09/25  1756 06/10/25  0731   * 130*   K 4.0 3.9    99   CO2 22* 20*   * 176*   BUN 14 19   CREATININE 1.8* 2.4*   CALCIUM 8.4* 8.3*   PROT 7.2 6.9   ALBUMIN 3.0* 3.0*   BILITOT 0.3 0.2   ALKPHOS 153* 150   AST 29 13   ALT 13 11   ANIONGAP 9 11       Significant Imaging: I have reviewed all pertinent imaging results/findings within the past 24 hours.

## 2025-06-10 NOTE — ED NOTES
Assumed care of the patient. Report received from RON Solares. Pt on continuous cardiac monitoring. Pt in hospital gown, side rails up X2, bed low and locked, and call light is placed within reach. No family/visitors at bedside at this time. Pt denies any complaints or needs.     Cecile Bowen, a 74 y.o. female presents to the ED w/ complaint of Hematuria     Triage note:  Chief Complaint   Patient presents with    Hematuria     Blood in urine starting today. Pt also endorses some wheezes. Pt has been taking PRN prescribed albuterol at home. Denies other c/o. Sent from dialysis clinic. Was able to complete dialysis today.      Review of patient's allergies indicates:  No Known Allergies  Past Medical History:   Diagnosis Date    Bacteremia due to Enterococcus 09/28/2021    Catheter-associated urinary tract infection 09/29/2021    Cervicogenic migraine     Chronic pain     CKD (chronic kidney disease) stage 4, GFR 15-29 ml/min     [facility]    CKD (chronic kidney disease) stage 4, GFR 15-29 ml/min     Diabetes mellitus     Long term use of Insulin, Diabetic Neuropathy    Dialysis patient 01/21/2022    Fibromyalgia     Hydronephrosis     Hyperlipidemia     Hypertension 12/12/2012    Hypothyroidism 12/12/2012    ARON (iron deficiency anemia)     Insomnia     Levoscoliosis     Malignant neoplasm of upper-outer quadrant of left breast in female, estrogen receptor positive     Metabolic acidosis     Mobility impaired     Nuclear sclerosis - Both Eyes 03/24/2014    VIJAYA (obstructive sleep apnea)     Osteopenia     Pulmonary nodule     Recurrent UTI     Renal manifestation of secondary diabetes mellitus     Secondary hyperparathyroidism, renal     Urinary retention     Urinary tract infection due to ESBL Klebsiella

## 2025-06-10 NOTE — ASSESSMENT & PLAN NOTE
Body mass index is 32.85 kg/m². Obesity complicates all aspects of disease management from diagnostic modalities to treatment. Weight loss encouraged and health benefits explained to patient.

## 2025-06-10 NOTE — ASSESSMENT & PLAN NOTE
Patient's FSGs are controlled on current medication regimen.  Last A1c reviewed-   Lab Results   Component Value Date    HGBA1C 5.4 04/16/2025     Most recent fingerstick glucose reviewed-   Recent Labs   Lab 06/10/25  0815 06/10/25  1124   POCTGLUCOSE 253* 220*     Current correctional scale  Low  Maintain anti-hyperglycemic dose as follows-   Antihyperglycemics (From admission, onward)      Start     Stop Route Frequency Ordered    06/09/25 2215  insulin glargine U-100 (Lantus) pen 5 Units         -- SubQ Nightly 06/09/25 2110 06/09/25 2136  insulin aspart U-100 pen 0-5 Units         -- SubQ Before meals & nightly PRN 06/09/25 2037          Hold Oral hypoglycemics while patient is in the hospital.  -Accuchleigha AC/HS

## 2025-06-10 NOTE — ASSESSMENT & PLAN NOTE
MWF schedule, last dialyzed 6/9  Residual renal function?- Yes    - Nephrology consulted  - Continue chronic hemodialysis  - Monitor daily electrolytes and defer dialysis orders to nephrology  - Renally dose medications  - Renal diet  - Continue phosphorus binders  - Daily weights/strict I&Os  - 1.5L FR

## 2025-06-10 NOTE — SUBJECTIVE & OBJECTIVE
Past Medical History:   Diagnosis Date    Bacteremia due to Enterococcus 09/28/2021    Catheter-associated urinary tract infection 09/29/2021    Cervicogenic migraine     Chronic pain     CKD (chronic kidney disease) stage 4, GFR 15-29 ml/min     [facility]    CKD (chronic kidney disease) stage 4, GFR 15-29 ml/min     Diabetes mellitus     Long term use of Insulin, Diabetic Neuropathy    Dialysis patient 01/21/2022    Fibromyalgia     Hydronephrosis     Hyperlipidemia     Hypertension 12/12/2012    Hypothyroidism 12/12/2012    ARON (iron deficiency anemia)     Insomnia     Levoscoliosis     Malignant neoplasm of upper-outer quadrant of left breast in female, estrogen receptor positive     Metabolic acidosis     Mobility impaired     Nuclear sclerosis - Both Eyes 03/24/2014    VIJAYA (obstructive sleep apnea)     Osteopenia     Pulmonary nodule     Recurrent UTI     Renal manifestation of secondary diabetes mellitus     Secondary hyperparathyroidism, renal     Urinary retention     Urinary tract infection due to ESBL Klebsiella        Past Surgical History:   Procedure Laterality Date    ANGIOGRAPHY OF UPPER EXTREMITY N/A 9/19/2024    Procedure: Angiogram Extremity Bilateral;  Surgeon: RODRIGO Friedman III, MD;  Location: [facility] OR 2ND FLR;  Service: Vascular;  Laterality: N/A;  R femoral approach, arch & arm angiogram  168.76mgy  33.1145 gycm2  8.9min    AV FISTULA PLACEMENT Left 2/14/2024    Procedure: CREATION, AV FISTULA;  Surgeon: RODRIGO Friedman III, MD;  Location: [facility] OR 2ND FLR;  Service: Vascular;  Laterality: Left;  LUE AVF creation vs AVG insertion    BIOPSY OF URETER Left 2/18/2022    Procedure: BIOPSY, URETER;  Surgeon: Ronel Whittington MD;  Location: [facility] OR 1ST FLR;  Service: Urology;  Laterality: Left;    BREAST BIOPSY Right     benign    CHOLECYSTECTOMY      COLONOSCOPY N/A 1/13/2017    Procedure: COLONOSCOPY;  Surgeon: Morris Wiseman MD;  Location: [facility] ENDO (4TH FLR);  Service: Endoscopy;   Laterality: N/A;  Renal pt Nephrology advised to avoid phosphate preps    COLONOSCOPY N/A 12/7/2023    Procedure: COLONOSCOPY;  Surgeon: Herve Allen MD;  Location: [facility] ENDO (2ND FLR);  Service: Endoscopy;  Laterality: N/A;  HD MWF; labs prior  suprapubic catheter  pt does not ambulate-uses christina lift- will have sling with her  9/7 ref. by Anastasiia Campbell, , PEG, instr. mailed, Eliquis hold approval pending-[facility] to hold Eliquis 2 days per Dr Navarro-GT  1/30-precall complete-MS    CYSTOSCOPY N/A 10/8/2018    Procedure: CYSTOSCOPY;  Surgeon: Ronel Whittington MD;  Location: [facility] OR 1ST FLR;  Service: Urology;  Laterality: N/A;  45 min    CYSTOSCOPY N/A 3/25/2019    Procedure: CYSTOSCOPY;  Surgeon: Ronel Whittington MD;  Location: [facility] OR 1ST FLR;  Service: Urology;  Laterality: N/A;  45 min    CYSTOSCOPY N/A 8/26/2019    Procedure: CYSTOSCOPY;  Surgeon: Ronel Whittington MD;  [facility];  Service: Urology;  Laterality: N/A;  45 min    CYSTOSCOPY N/A 7/2/2021    Procedure: CYSTOSCOPY;  Surgeon: Ronel Whittington MD;  [facility];  Service: Urology;  Laterality: N/A;    CYSTOSCOPY  12/23/2021    Procedure: CYSTOSCOPY;  Surgeon: Ronel Whittington MD;  [facility];  Service: Urology;;    CYSTOSCOPY  2/18/2022    Procedure: CYSTOSCOPY;  Surgeon: Ronel Whittington MD;  [facility];  Service: Urology;;    CYSTOSCOPY W/ URETERAL STENT PLACEMENT Left 5/15/2021    Procedure: CYSTOSCOPY, WITH URETERAL STENT INSERTION;  Surgeon: Levy Sánchez Jr., MD;  Location: [facility] OR 1ST FLR;  Service: Urology;  Laterality: Left;    CYSTOSCOPY WITH BIOPSY OF BLADDER N/A 1/27/2020    Procedure: CYSTOSCOPY, WITH BLADDER BIOPSY, WITH FULGURATION IF INDICATED;  Surgeon: Ronel Whittington MD;  Location: [facility] OR [address] [place] [address];  Service: Urology;  Laterality: N/A;    DILATION AND CURETTAGE OF UTERUS      FISTULOGRAM N/A 4/29/2024    Procedure:  Fistulogram;  Surgeon: RODRIGO Friedman III, MD;  Location: [facility] CATH LAB;  Service: Peripheral Vascular;  Laterality: N/A;    FISTULOGRAM Left 1/6/2025    Procedure: Fistulogram;  Surgeon: RODRIGO Friedman III, MD;  Location: [facility] CATH LAB;  Service: Peripheral Vascular;  Laterality: Left;    GALLBLADDER SURGERY  2006    HYSTERECTOMY      INJECTION FOR SENTINEL NODE IDENTIFICATION Left 8/1/2019    Procedure: INJECTION, FOR SENTINEL NODE IDENTIFICATION;  Surgeon: Samia Fulton MD;  Location: [facility] OR [address] [place] [address];  Service: General;  Laterality: Left;    INJECTION OF BOTULINUM TOXIN TYPE A N/A 10/8/2018    Procedure: INJECTION, BOTULINUM TOXIN, TYPE A 300 UNITS;  Surgeon: Ronel Whittington MD;  Location: [address];  Service: Urology;  Laterality: N/A;    INJECTION OF BOTULINUM TOXIN TYPE A N/A 3/25/2019    Procedure: INJECTION, BOTULINUM TOXIN, TYPE A 300 UNITS;  Surgeon: Ronel Whittington MD;  Location: [address];  Service: Urology;  Laterality: N/A;    INJECTION OF BOTULINUM TOXIN TYPE A N/A 8/26/2019    Procedure: INJECTION, BOTULINUM TOXIN, TYPE A 300 UNITS;  Surgeon: Ronel Whittington MD;  Location: [address];  Service: Urology;  Laterality: N/A;    MASTECTOMY Left 8/1/2019    Procedure: MASTECTOMY 23 hour stay;  Surgeon: Samia Fulton MD;  Location: [facility] OR [address] [place] [address];  Service: General;  Laterality: Left;    MEDIPORT REMOVAL Right 6/24/2022    Procedure: REMOVAL, CATHETER, CENTRAL VENOUS, TUNNELED, WITH PORT;  Surgeon: Isauro Anderson MD;  Location: [facility] CATH LAB;  Service: Radiology;  Laterality: Right;    OVARIAN CYST SURGERY  1985    PERCUTANEOUS TRANSLUMINAL ANGIOPLASTY OF ARTERIOVENOUS FISTULA Left 4/29/2024    Procedure: PTA, AV FISTULA;  Surgeon: RODRIGO Friedman III, MD;  Location: [facility] CATH LAB;  Service: Peripheral Vascular;  Laterality: Left;    REPLACEMENT OF STENT Left 12/23/2021    Procedure: REPLACEMENT, STENT;  Surgeon: Ronel Whittington MD;  Location: [facility]  OR 1ST FLR;  Service: Urology;  Laterality: Left;    REPLACEMENT OF STENT Left 2/18/2022    Procedure: REPLACEMENT, STENT;  Surgeon: Ronel Whittington MD;  Location: [facility] OR 1ST FLR;  Service: Urology;  Laterality: Left;    RETROGRADE PYELOGRAPHY Left 2/18/2022    Procedure: PYELOGRAM, RETROGRADE;  Surgeon: Ronel Whittington MD;  Location: [facility] OR 1ST FLR;  Service: Urology;  Laterality: Left;    SENTINEL LYMPH NODE BIOPSY Left 8/1/2019    Procedure: BIOPSY, LYMPH NODE, SENTINEL;  Surgeon: Samia Fulton MD;  Location: [facility] OR 2ND FLR;  Service: General;  Laterality: Left;    spt placement      TONSILLECTOMY, ADENOIDECTOMY      TOTAL ABDOMINAL HYSTERECTOMY W/ BILATERAL SALPINGOOPHORECTOMY  1985    URETEROSCOPY Left 2/18/2022    Procedure: URETEROSCOPY;  Surgeon: Ronel Whittington MD;  [facility];  Service: Urology;  Laterality: Left;       Review of patient's allergies indicates:  No Known Allergies  Current Facility-Administered Medications   Medication Frequency    acetaminophen tablet 650 mg Q6H PRN    albuterol-ipratropium 2.5 mg-0.5 mg/3 mL nebulizer solution 3 mL Q6H WAKE    amLODIPine tablet 10 mg Daily    anastrozole tablet 1 mg Daily    apixaban tablet 5 mg BID    carvediloL tablet 12.5 mg BID    dextromethorphan-guaiFENesin  mg per 12 hr tablet 1 tablet BID    dextrose 50% injection 12.5 g PRN    dextrose 50% injection 25 g PRN    DULoxetine DR capsule 40 mg Daily    furosemide tablet 80 mg Daily    gentamicin (GARAMYCIN) 155.2 mg in 0.9% NaCl 100 mL IVPB Once    gentamicin - pharmacy to dose pharmacy to manage frequency    glucagon (human recombinant) injection 1 mg PRN    glucose chewable tablet 16 g PRN    glucose chewable tablet 24 g PRN    guaiFENesin 100 mg/5 ml syrup 200 mg Q4H PRN    HYDROcodone-acetaminophen  mg per tablet 1 tablet Q6H PRN    insulin aspart U-100 pen 0-5 Units QID (AC + HS) PRN    insulin glargine U-100 (Lantus) pen 5 Units QHS     lactulose 20 gram/30 mL solution Soln 20 g BID PRN    levothyroxine tablet 50 mcg Before breakfast    losartan tablet 100 mg Daily    methocarbamoL tablet 750 mg TID PRN    naloxone 0.4 mg/mL injection 0.02 mg PRN    ondansetron injection 4 mg Q8H PRN    oxybutynin 24 hr tablet 10 mg Daily    oxyCODONE immediate release tablet Tab 10 mg Daily PRN    polyethylene glycol packet 17 g Daily    predniSONE tablet 40 mg Daily    senna-docusate 8.6-50 mg per tablet 1 tablet BID    sodium chloride 0.9% flush 10 mL Q12H PRN    traZODone tablet 100 mg QHS     Facility-Administered Medications Ordered in Other Encounters   Medication Frequency    epoetin chloe injection 2,000 Units 1 time in Clinic/HOD    heparin (porcine) injection 2,000 Units Once    heparin (porcine) injection 2,000 Units Once    heparin (porcine) injection 4,000 Units 1 time in Clinic/HOD    iron sucrose injection 100 mg 1 time in Clinic/HOD     Family History       Problem Relation (Age of Onset)    ALS Mother    Cancer Maternal Grandmother, Paternal Grandfather, Brother    Diabetes Sister, Maternal Aunt, Maternal Uncle    Kidney disease Sister, Maternal Aunt    Prostate cancer Brother    Scoliosis Brother          Tobacco Use    Smoking status: Never     Passive exposure: Past    Smokeless tobacco: Never   Substance and Sexual Activity    Alcohol use: No     Alcohol/week: 0.0 standard drinks of alcohol    Drug use: No    Sexual activity: Not Currently     Birth control/protection: Abstinence     Review of Systems   Constitutional:  Negative for chills, diaphoresis, fatigue and fever.   Respiratory:  Positive for cough. Negative for shortness of breath and wheezing.    Cardiovascular:  Positive for leg swelling. Negative for chest pain.   Gastrointestinal:  Negative for diarrhea and nausea.   Genitourinary:  Positive for decreased urine volume.   Neurological:  Negative for weakness.   Objective:     Vital Signs (Most Recent):  Temp: 98.2 °F (36.8 °C)  (06/10/25 1030)  Pulse: 87 (06/10/25 1030)  Resp: 18 (06/10/25 1030)  BP: (!) 172/82 (06/10/25 1030)  SpO2: 97 % (06/10/25 1030) Vital Signs (24h Range):  Temp:  [97.4 °F (36.3 °C)-98.8 °F (37.1 °C)] 98.2 °F (36.8 °C)  Pulse:  [57-87] 87  Resp:  [16-28] 18  SpO2:  [94 %-100 %] 97 %  BP: (100-172)/(58-93) 172/82     Weight: 98 kg (216 lb 0.8 oz) (06/10/25 0555)  Body mass index is 32.85 kg/m².  Body surface area is 2.17 meters squared.    I/O last 3 completed shifts:  In: 420 [P.O.:420]  Out: 70 [Urine:70]     Physical Exam  Vitals and nursing note reviewed.   Constitutional:       Appearance: She is obese.   HENT:      Mouth/Throat:      Pharynx: Oropharynx is clear.   Cardiovascular:      Rate and Rhythm: Normal rate.      Pulses: Normal pulses.   Pulmonary:      Effort: Pulmonary effort is normal. No respiratory distress.      Breath sounds: No wheezing.   Abdominal:      Palpations: Abdomen is soft.   Musculoskeletal:      Right lower leg: No edema.      Left lower leg: No edema.   Skin:     General: Skin is dry.   Neurological:      Mental Status: She is alert and oriented to person, place, and time.   Psychiatric:         Mood and Affect: Mood normal.        Significant Labs:  CBC:   Recent Labs   Lab 06/10/25  0731   WBC 5.45   RBC 3.87*   HGB 11.1*   HCT 36.9*      MCV 95   MCH 28.7   MCHC 30.1*     CMP:   Recent Labs   Lab 06/10/25  0731   *   CALCIUM 8.3*   ALBUMIN 3.0*   PROT 6.9   *   K 3.9   CO2 20*   CL 99   BUN 19   CREATININE 2.4*   ALKPHOS 150   ALT 11   AST 13   BILITOT 0.2     All labs within the past 24 hours have been reviewed.

## 2025-06-10 NOTE — HOSPITAL COURSE
Patient admitted to hospital medicine for SOB and UTI. Patient SOB 2/2 volume overload as she recently ran out of her lasix. Nephrology consulted for HD. Patient noted to have UTI, HX of ESBL and MDRO. Started gentamicin for now based on previous cultures.  Final urine cultures show ecoli. ID consulted, ertapenem started. Plan for IV abx after HD outpatient. Qeaned of oxygen, tolerating RA well and satting in high 90s. Follow up requested for ACAH and HH infusion.     Plan and medication changes discussed with patient; agreeable to plan. ER precautions were given. All questions were answered to the patient's satisfaction and she was subsequently discharged.      Physical Exam  Gen: in NAD, appears stated age  Neuro: AAOx3, motor, sensory, and strength grossly intact BL  HEENT: EOMI, PERRLA; no JVD appreciated  CVS: RRR, no m/r/g  Resp: lungs CTAB, no w/r/r; no belabored breathing or accessory muscle use appreciated   Abd: NTND, soft to palpation  Extrem: no UE or LE edema BL

## 2025-06-10 NOTE — ASSESSMENT & PLAN NOTE
Patient has recurrent depression which is moderate and is currently controlled. Will Continue anti-depressant medications. We will not consult psychiatry at this time. Patient does not display psychosis at this time. Continue to monitor closely and adjust plan of care as needed.  -Continue home cymbalta and trazodone.

## 2025-06-10 NOTE — PLAN OF CARE
Problem: Skin Injury Risk Increased  Goal: Skin Health and Integrity  Outcome: Progressing     Problem: Adult Inpatient Plan of Care  Goal: Plan of Care Review  Outcome: Progressing  Goal: Patient-Specific Goal (Individualized)  Outcome: Progressing  Goal: Absence of Hospital-Acquired Illness or Injury  Outcome: Progressing  Goal: Optimal Comfort and Wellbeing  Outcome: Progressing  Goal: Readiness for Transition of Care  Outcome: Progressing     Problem: Diabetes Comorbidity  Goal: Blood Glucose Level Within Targeted Range  Outcome: Progressing     Problem: Acute Kidney Injury/Impairment  Goal: Fluid and Electrolyte Balance  Outcome: Progressing  Goal: Improved Oral Intake  Outcome: Progressing  Goal: Effective Renal Function  Outcome: Progressing     Problem: Wound  Goal: Optimal Coping  Outcome: Progressing  Goal: Optimal Functional Ability  Outcome: Progressing  Goal: Absence of Infection Signs and Symptoms  Outcome: Progressing  Goal: Improved Oral Intake  Outcome: Progressing  Goal: Optimal Pain Control and Function  Outcome: Progressing  Goal: Skin Health and Integrity  Outcome: Progressing  Goal: Optimal Wound Healing  Outcome: Progressing     Problem: Fall Injury Risk  Goal: Absence of Fall and Fall-Related Injury  Outcome: Progressing     Problem: Infection  Goal: Absence of Infection Signs and Symptoms  Outcome: Progressing     Problem: Hypertension Acute  Goal: Blood Pressure Within Desired Range  Outcome: Progressing     Problem: Self-Care Deficit  Goal: Improved Ability to Complete Activities of Daily Living  Outcome: Progressing     Problem: Activity Intolerance  Goal: Enhanced Capacity and Energy  Outcome: Progressing     Problem: Hemodialysis  Goal: Safe, Effective Therapy Delivery  Outcome: Progressing  Goal: Effective Tissue Perfusion  Outcome: Progressing  Goal: Absence of Infection Signs and Symptoms  Outcome: Progressing

## 2025-06-10 NOTE — HPI
"Ceclie Bowen is a 74 y.o. female with a PMHx of HTN, HLD, hypothyroidism, DM2, migraines, anemia, fibromyalgia, insomnia, depression, chronic pain, VIJAYA, urinary retention s/p suprapubic catheter placement, and ESRD on HD (MWF) who presents for evaluation of hematuria, shortness of breath, and cough. She reports shortness of breath, intermittent wheezing, and cough productive of "greyish" sputum x6-7 days. Notes she has had this issue several times in the past and thought she may have some extra fluid. She went to her dialysis appointment  yesterday and had a 4 hour session completed. EDW 98 kg. Nephrology consulted for ESRD.     HPI obtained via EMR and patient interview     "

## 2025-06-10 NOTE — HPI
"Cecile Bwoen is a 74 y.o. female with a PMHx of HTN, HLD, hypothyroidism, DM2, migraines, anemia, fibromyalgia, insomnia, depression, chronic pain, VIJAYA, urinary retention s/p suprapubic catheter placement, and ESRD on HD (MWF) who presents for evaluation of hematuria, shortness of breath, and cough. She reports shortness of breath, intermittent wheezing, and cough productive of "greyish" sputum x6-7 days. Notes she has had this issue several times in the past and thought she may have some extra fluid. She went to her dialysis appointment today and had a 4 hour session completed. She reports she was still having the symptoms after HD and is under her usual dry weight. She also notes hematuria beginning this morning. Reports she typically has this when she is getting a UTI. She denies abdominal pain, fever/chills, N/VD, CP, congestion, or sore throat. She does endorse BLE edema, right leg greater than left, that is chronic and better than baseline. Pt reports she is wheelchair bound at baseline and uses a christina lift to transfer.    In the ED, VSS, afebrile. CBC with stable anemia. Na 133. Bicarb 22. Glucose 120. Cr 1.8 (hx of ESRD). Calcium 8.4. Albumin 3.0. . . HS troponin 14. EKG shows SR w/ PACs, 78 bpm, no ST elevation. Flu/COVID negative. UA with 3+ leukocytes, >100 RBCs, >100 WBCs, and many bacteria; urine sample obtained from chronic suprapubic catheter that was last changed about 4 weeks ago. Will have nursing exchange catheter and obtain new UA. CXR with pulmonary findings suggest edema, bronchial airway inflammation or infection not excluded. No large focal consolidation. The patient received a duo neb treatment, prednisone 40mg, and keflex.  "

## 2025-06-10 NOTE — ASSESSMENT & PLAN NOTE
Pt reports shortness of breath and productive cough x6-7 days. Intermittent wheezing. Denies rhinorrhea, sore throat, fever, or chills. Possible bronchitis or undiagnosed COPD or hypervolemia; pt reports she has never been a smoker but notes her father and brother that she previously lived with did smoke.  -Afebrile, no leukocytosis.  -  -CXR with pulmonary findings suggest edema, bronchial airway inflammation or infection not excluded. No large focal consolidation.   -CT chest pending.  -Sputum cx pending.  -Flu/COVID negative.  -Duo nebs q6h while awake and PRN.   -Will start mucinex DM and prednisone 40mg qd  -PFTs ordered outpatient but have not been completed.

## 2025-06-10 NOTE — ASSESSMENT & PLAN NOTE
Anemia is likely due to chronic disease due to ESRD. Most recent hemoglobin and hematocrit are listed below.  Recent Labs     06/09/25  1756 06/10/25  0731   HGB 10.6* 11.1*   HCT 34.0* 36.9*     Plan  - Monitor serial CBC: Daily  - Transfuse PRBC if patient becomes hemodynamically unstable, symptomatic or H/H drops below 7/21.  - Patient has not received any PRBC transfusions to date  - Patient's anemia is currently stable

## 2025-06-10 NOTE — ASSESSMENT & PLAN NOTE
Body mass index is 32.84 kg/m². Obesity complicates all aspects of disease management from diagnostic modalities to treatment. Weight loss encouraged and health benefits explained to patient.

## 2025-06-10 NOTE — ASSESSMENT & PLAN NOTE
Patient has chronic hypothyroidism. TFTs reviewed-   Lab Results   Component Value Date    TSH 1.631 12/31/2024   . Will continue chronic levothyroxine and adjust for and clinical changes.

## 2025-06-10 NOTE — ASSESSMENT & PLAN NOTE
-Pt reports hematuria starting today. Denies abdominal pain or fever/chills.  -Afebrile, no leukocytosis; 0/4 SIRS  -Initial UA obtained from chronic suprapubic murillo, concern for contaminated sample given catheter has been in place for almost 4 weeks.  -Nursing exchanged suprapubic catheter 6/9.  -Repeat UA infectious  -start gentamicin based on previous cultures  -Suprapubic catheter care per nursing.

## 2025-06-10 NOTE — ASSESSMENT & PLAN NOTE
Grade I invasive ductal carcinoma ER+, CA+ s/p radical mastectomy with sentinel lymph node bx   -Follows with oncology  -Continue anastrazole

## 2025-06-10 NOTE — SUBJECTIVE & OBJECTIVE
Past Medical History:   Diagnosis Date    Bacteremia due to Enterococcus 09/28/2021    Catheter-associated urinary tract infection 09/29/2021    Cervicogenic migraine     Chronic pain     CKD (chronic kidney disease) stage 4, GFR 15-29 ml/min     [facility]    CKD (chronic kidney disease) stage 4, GFR 15-29 ml/min     Diabetes mellitus     Long term use of Insulin, Diabetic Neuropathy    Dialysis patient 01/21/2022    Fibromyalgia     Hydronephrosis     Hyperlipidemia     Hypertension 12/12/2012    Hypothyroidism 12/12/2012    ARON (iron deficiency anemia)     Insomnia     Levoscoliosis     Malignant neoplasm of upper-outer quadrant of left breast in female, estrogen receptor positive     Metabolic acidosis     Mobility impaired     Nuclear sclerosis - Both Eyes 03/24/2014    VIJAYA (obstructive sleep apnea)     Osteopenia     Pulmonary nodule     Recurrent UTI     Renal manifestation of secondary diabetes mellitus     Secondary hyperparathyroidism, renal     Urinary retention     Urinary tract infection due to ESBL Klebsiella        Past Surgical History:   Procedure Laterality Date    ANGIOGRAPHY OF UPPER EXTREMITY N/A 9/19/2024    Procedure: Angiogram Extremity Bilateral;  Surgeon: RODRIGO Friedman III, MD;  Location: [facility] OR 2ND FLR;  Service: Vascular;  Laterality: N/A;  R femoral approach, arch & arm angiogram  168.76mgy  33.1145 gycm2  8.9min    AV FISTULA PLACEMENT Left 2/14/2024    Procedure: CREATION, AV FISTULA;  Surgeon: RODRIGO Friedman III, MD;  Location: [facility] OR 2ND FLR;  Service: Vascular;  Laterality: Left;  LUE AVF creation vs AVG insertion    BIOPSY OF URETER Left 2/18/2022    Procedure: BIOPSY, URETER;  Surgeon: Ronel Whittington MD;  Location: [facility] OR 1ST FLR;  Service: Urology;  Laterality: Left;    BREAST BIOPSY Right     benign    CHOLECYSTECTOMY      COLONOSCOPY N/A 1/13/2017    Procedure: COLONOSCOPY;  Surgeon: Morris Wiseman MD;  Location: [facility] ENDO (4TH FLR);  Service: Endoscopy;   Laterality: N/A;  Renal pt Nephrology advised to avoid phosphate preps    COLONOSCOPY N/A 12/7/2023    Procedure: COLONOSCOPY;  Surgeon: Herve Allen MD;  Location: [facility] ENDO (2ND FLR);  Service: Endoscopy;  Laterality: N/A;  HD MWF; labs prior  suprapubic catheter  pt does not ambulate-uses christina lift- will have sling with her  9/7 ref. by Anastasiia Campbell, , PEG, instr. mailed, Eliquis hold approval pending-[facility] to hold Eliquis 2 days per Dr Navarro-GT  1/30-precall complete-MS    CYSTOSCOPY N/A 10/8/2018    Procedure: CYSTOSCOPY;  Surgeon: Ronel Whittington MD;  Location: [facility] OR 1ST FLR;  Service: Urology;  Laterality: N/A;  45 min    CYSTOSCOPY N/A 3/25/2019    Procedure: CYSTOSCOPY;  Surgeon: Ronel Whittington MD;  Location: [facility] OR 1ST FLR;  Service: Urology;  Laterality: N/A;  45 min    CYSTOSCOPY N/A 8/26/2019    Procedure: CYSTOSCOPY;  Surgeon: Ronel Whittington MD;  [facility];  Service: Urology;  Laterality: N/A;  45 min    CYSTOSCOPY N/A 7/2/2021    Procedure: CYSTOSCOPY;  Surgeon: Ronel Whittington MD;  [facility];  Service: Urology;  Laterality: N/A;    CYSTOSCOPY  12/23/2021    Procedure: CYSTOSCOPY;  Surgeon: Ronel Whittington MD;  [facility];  Service: Urology;;    CYSTOSCOPY  2/18/2022    Procedure: CYSTOSCOPY;  Surgeon: Ronel Whittington MD;  [facility];  Service: Urology;;    CYSTOSCOPY W/ URETERAL STENT PLACEMENT Left 5/15/2021    Procedure: CYSTOSCOPY, WITH URETERAL STENT INSERTION;  Surgeon: Levy Sánchez Jr., MD;  Location: [facility] OR 1ST FLR;  Service: Urology;  Laterality: Left;    CYSTOSCOPY WITH BIOPSY OF BLADDER N/A 1/27/2020    Procedure: CYSTOSCOPY, WITH BLADDER BIOPSY, WITH FULGURATION IF INDICATED;  Surgeon: Ronel Whittington MD;  Location: [facility] OR [address] [place] [address];  Service: Urology;  Laterality: N/A;    DILATION AND CURETTAGE OF UTERUS      FISTULOGRAM N/A 4/29/2024    Procedure:  Fistulogram;  Surgeon: RODRIGO Friedman III, MD;  Location: [facility] CATH LAB;  Service: Peripheral Vascular;  Laterality: N/A;    FISTULOGRAM Left 1/6/2025    Procedure: Fistulogram;  Surgeon: RODRIGO Friedman III, MD;  Location: [facility] CATH LAB;  Service: Peripheral Vascular;  Laterality: Left;    GALLBLADDER SURGERY  2006    HYSTERECTOMY      INJECTION FOR SENTINEL NODE IDENTIFICATION Left 8/1/2019    Procedure: INJECTION, FOR SENTINEL NODE IDENTIFICATION;  Surgeon: Samia Fulton MD;  Location: [facility] OR [address] [place] [address];  Service: General;  Laterality: Left;    INJECTION OF BOTULINUM TOXIN TYPE A N/A 10/8/2018    Procedure: INJECTION, BOTULINUM TOXIN, TYPE A 300 UNITS;  Surgeon: Ronel Whittington MD;  Location: [address];  Service: Urology;  Laterality: N/A;    INJECTION OF BOTULINUM TOXIN TYPE A N/A 3/25/2019    Procedure: INJECTION, BOTULINUM TOXIN, TYPE A 300 UNITS;  Surgeon: Ronel Whittington MD;  Location: [address];  Service: Urology;  Laterality: N/A;    INJECTION OF BOTULINUM TOXIN TYPE A N/A 8/26/2019    Procedure: INJECTION, BOTULINUM TOXIN, TYPE A 300 UNITS;  Surgeon: Ronel Whittington MD;  Location: [address];  Service: Urology;  Laterality: N/A;    MASTECTOMY Left 8/1/2019    Procedure: MASTECTOMY 23 hour stay;  Surgeon: Samia Fulton MD;  Location: [facility] OR [address] [place] [address];  Service: General;  Laterality: Left;    MEDIPORT REMOVAL Right 6/24/2022    Procedure: REMOVAL, CATHETER, CENTRAL VENOUS, TUNNELED, WITH PORT;  Surgeon: Isauro Anderson MD;  Location: [facility] CATH LAB;  Service: Radiology;  Laterality: Right;    OVARIAN CYST SURGERY  1985    PERCUTANEOUS TRANSLUMINAL ANGIOPLASTY OF ARTERIOVENOUS FISTULA Left 4/29/2024    Procedure: PTA, AV FISTULA;  Surgeon: RODRIGO Friedman III, MD;  Location: [facility] CATH LAB;  Service: Peripheral Vascular;  Laterality: Left;    REPLACEMENT OF STENT Left 12/23/2021    Procedure: REPLACEMENT, STENT;  Surgeon: Ronel Whittington MD;  Location: [facility]  "OR 1ST FLR;  Service: Urology;  Laterality: Left;    REPLACEMENT OF STENT Left 2022    Procedure: REPLACEMENT, STENT;  Surgeon: Ronel Whittington MD;  Location: [facility] OR 1ST FLR;  Service: Urology;  Laterality: Left;    RETROGRADE PYELOGRAPHY Left 2022    Procedure: PYELOGRAM, RETROGRADE;  Surgeon: Ronel Whittington MD;  Location: [facility] OR 1ST FLR;  Service: Urology;  Laterality: Left;    SENTINEL LYMPH NODE BIOPSY Left 2019    Procedure: BIOPSY, LYMPH NODE, SENTINEL;  Surgeon: Samia Fulton MD;  Location: [facility] OR 2ND FLR;  Service: General;  Laterality: Left;    spt placement      TONSILLECTOMY, ADENOIDECTOMY      TOTAL ABDOMINAL HYSTERECTOMY W/ BILATERAL SALPINGOOPHORECTOMY  1985    URETEROSCOPY Left 2022    Procedure: URETEROSCOPY;  Surgeon: Ronel Whittington MD;  Location: [facility] OR 1ST FLR;  Service: Urology;  Laterality: Left;       Review of patient's allergies indicates:  No Known Allergies    Current Facility-Administered Medications on File Prior to Encounter   Medication    epoetin chloe injection 2,000 Units    [COMPLETED] epoetin chloe injection 4,000 Units    [] heparin (porcine) injection 1,000 Units    heparin (porcine) injection 2,000 Units    heparin (porcine) injection 2,000 Units    [COMPLETED] heparin (porcine) injection 2,000 Units    heparin (porcine) injection 4,000 Units    iron sucrose injection 100 mg    [COMPLETED] iron sucrose Soln 100 mg     Current Outpatient Medications on File Prior to Encounter   Medication Sig    albuterol (PROVENTIL/VENTOLIN HFA) 90 mcg/actuation inhaler Inhale 1-2 puffs into the lungs every 6 (six) hours as needed for Wheezing or Shortness of Breath. Rescue    amLODIPine (NORVASC) 10 MG tablet TAKE 1 TABLET BY MOUTH EVERY DAY    anastrozole (ARIMIDEX) 1 mg Tab TAKE 1 TABLET BY MOUTH EVERY DAY    BD INSULIN SYRINGE ULTRA-FINE 0.5 mL 31 gauge x 5/16" Syrg USE WITH INSULIN 4 TIMES A DAY    carvediloL (COREG) 12.5 MG tablet Take 1 " "tablet (12.5 mg total) by mouth 2 (two) times daily.    diclofenac sodium (VOLTAREN) 1 % Gel Apply 2 g topically 2 (two) times daily as needed (arthritis).    DULoxetine (CYMBALTA) 20 MG capsule TAKE 2 CAPSULES(40 MG) BY MOUTH DAILY    ELIQUIS 5 mg Tab TAKE 1 TABLET BY MOUTH TWICE A DAY    erenumab-aooe (AIMOVIG AUTOINJECTOR) 140 mg/mL autoinjector 140 mg MONTHLY (route: subcutaneous)    ergocalciferol (ERGOCALCIFEROL) 50,000 unit Cap Take 1 capsule (50,000 Units total) by mouth every 7 days.    furosemide (LASIX) 80 MG tablet Take 1 tablet (80 mg total) by mouth once daily.    galcanezumab-gnlm 120 mg/mL Syrg Inject 120 mg into the skin every 28 days. maintenance dose    HYDROcodone-acetaminophen (NORCO)  mg per tablet Take 1 tablet by mouth every 6 (six) hours as needed for Pain.    ipratropium (ATROVENT) 21 mcg (0.03 %) nasal spray USE 2 SPRAYS IN EACH NOSTRIL TWICE DAILY    lactulose (CHRONULAC) 10 gram/15 mL solution Take 20 g by mouth 2 (two) times daily as needed (constipation).    LANTUS SOLOSTAR U-100 INSULIN 100 unit/mL (3 mL) InPn pen INJECT 12-15 UNITS UNDER THE SKIN at night    losartan (COZAAR) 100 MG tablet TAKE 1 TABLET(100 MG) BY MOUTH DAILY    magnesium oxide (MAG-OX) 400 mg (241.3 mg magnesium) tablet TAKE 1 TABLET(400 MG) BY MOUTH DAILY    methocarbamoL (ROBAXIN) 750 MG Tab TAKE 1 TO 2 TABLETS(750 TO 1500 MG) BY MOUTH THREE TIMES DAILY AS NEEDED FOR MUSCLE SPASMS    oxybutynin (DITROPAN-XL) 10 MG 24 hr tablet TAKE 1 TABLET BY MOUTH EVERY DAY    oxyCODONE (ROXICODONE) 10 mg Tab immediate release tablet Take 1 tablet (10 mg total) by mouth daily as needed (Severe pain).    pen needle, diabetic (BD ULTRA-FINE SHORT PEN NEEDLE) 31 gauge x 5/16" Ndle USE WITH LANTUS DAILY, e 11.65    sodium bicarbonate 650 MG tablet Take 1 tablet (650 mg total) by mouth once daily.    sucroferric oxyhydroxide (VELPHORO) 500 mg Chew Take by mouth 3 (three) times daily with meals.    sumatriptan (IMITREX) 100 MG " tablet Take 1 tablet (100 mg total) by mouth every 2 (two) hours as needed for Migraine (Limit 2 in 14 hours and 9 in one month).    SYNTHROID 50 mcg tablet TAKE 1 TABLET BY MOUTH BEFORE BREAKFAST. ADMINISTER ON AN EMPTY STOMACH AT LEAST 30 MINUTES BEFORE FOOD. IF RECEIVING TUBE FEEDS, HOLD TUBE FEEDS FOR 1 HOUR BEFORE AND AFTER LEVOTHYROXINE ADMINISTRATION.    traZODone (DESYREL) 100 MG tablet TAKE 1 TABLET BY MOUTH NIGHTLY AS NEEDED FOR INSOMNIA.     Family History       Problem Relation (Age of Onset)    ALS Mother    Cancer Maternal Grandmother, Paternal Grandfather, Brother    Diabetes Sister, Maternal Aunt, Maternal Uncle    Kidney disease Sister, Maternal Aunt    Prostate cancer Brother    Scoliosis Brother          Tobacco Use    Smoking status: Never    Smokeless tobacco: Never   Substance and Sexual Activity    Alcohol use: No     Alcohol/week: 0.0 standard drinks of alcohol    Drug use: No    Sexual activity: Not Currently     Birth control/protection: Abstinence     Review of Systems   Constitutional:  Negative for appetite change, chills, diaphoresis, fatigue and fever.   HENT:  Negative for congestion, rhinorrhea and sore throat.    Eyes:  Negative for photophobia and visual disturbance.   Respiratory:  Positive for cough, shortness of breath and wheezing.    Cardiovascular:  Positive for leg swelling. Negative for chest pain and palpitations.   Gastrointestinal:  Negative for abdominal distention, abdominal pain, diarrhea, nausea and vomiting.   Genitourinary:  Positive for decreased urine volume (ESRD on HD) and hematuria.   Musculoskeletal:  Positive for back pain (chronic) and gait problem (chronic, wheelchair bound). Negative for myalgias and neck pain.   Skin:  Negative for color change, pallor and rash.   Neurological:  Negative for syncope, light-headedness, numbness and headaches.   Psychiatric/Behavioral:  Negative for confusion and hallucinations. The patient is not nervous/anxious.       Objective:     Vital Signs (Most Recent):  Temp: 98.8 °F (37.1 °C) (06/09/25 1900)  Pulse: 80 (06/09/25 2000)  Resp: 20 (06/09/25 1900)  BP: 136/61 (06/09/25 2000)  SpO2: 98 % (06/09/25 2000) Vital Signs (24h Range):  Temp:  [98.6 °F (37 °C)-98.8 °F (37.1 °C)] 98.8 °F (37.1 °C)  Pulse:  [57-80] 80  Resp:  [18-20] 20  SpO2:  [95 %-100 %] 98 %  BP: (100-166)/(58-93) 136/61     Weight: 98 kg (216 lb)  Body mass index is 32.84 kg/m².     Physical Exam  Vitals and nursing note reviewed.   Constitutional:       General: She is not in acute distress.     Appearance: She is obese. She is not toxic-appearing or diaphoretic.      Comments: Chronically ill appearing   HENT:      Head: Normocephalic and atraumatic.      Nose: Nose normal.      Mouth/Throat:      Mouth: Mucous membranes are moist.   Eyes:      Pupils: Pupils are equal, round, and reactive to light.   Cardiovascular:      Rate and Rhythm: Normal rate and regular rhythm.      Pulses: Normal pulses.      Arteriovenous access: Left arteriovenous access is present.     Comments: +thrill  Pulmonary:      Effort: Pulmonary effort is normal. No respiratory distress.      Breath sounds: Examination of the right-upper field reveals wheezing. Examination of the left-upper field reveals wheezing. Examination of the right-lower field reveals decreased breath sounds. Examination of the left-lower field reveals decreased breath sounds. Decreased breath sounds and wheezing present. No rhonchi.   Abdominal:      General: Bowel sounds are normal. There is no distension.      Palpations: Abdomen is soft.      Tenderness: There is no abdominal tenderness. There is no guarding.      Comments: Suprapubic catheter currently being exchanged by nursing   Musculoskeletal:         General: Normal range of motion.      Cervical back: Normal range of motion.      Right lower leg: Edema present.      Left lower leg: Edema present.      Comments: BLE edema, Right greater than left which  is chronic per pt   Neurological:      Mental Status: She is alert.              CRANIAL NERVES     CN III, IV, VI   Pupils are equal, round, and reactive to light.       Significant Labs: All pertinent labs within the past 24 hours have been reviewed.  CBC:   Recent Labs   Lab 06/09/25 1756   WBC 7.79   HGB 10.6*   HCT 34.0*        CMP:   Recent Labs   Lab 06/09/25 1756   *   K 4.0      CO2 22*   *   BUN 14   CREATININE 1.8*   CALCIUM 8.4*   PROT 7.2   ALBUMIN 3.0*   BILITOT 0.3   ALKPHOS 153*   AST 29   ALT 13   ANIONGAP 9     Cardiac Markers:   Recent Labs   Lab 06/09/25 1756   *     Troponin:   Recent Labs   Lab 06/09/25 1756   TROPONINIHS 14     Urine Studies:   Recent Labs   Lab 06/09/25  1908   COLORU Brown*   APPEARANCEUA Cloudy*   PHUR 8.0   SPECGRAV 1.020   PROTEINUA 3+*   GLUCUA Negative   BILIRUBINUA Negative   OCCULTUA 3+*   NITRITE Negative   UROBILINOGEN Negative   LEUKOCYTESUR 3+*   RBCUA >100*   WBCUA >100*   BACTERIA Many*   HYALINECASTS 0       Significant Imaging: I have reviewed all pertinent imaging results/findings within the past 24 hours.  Imaging Results              X-Ray Chest AP Portable (Final result)  Result time 06/09/25 19:44:26      Final result by Saul Garcia MD (06/09/25 19:44:26)                   Impression:      1. Pulmonary findings suggest edema, bronchial airway inflammation or infection not excluded.  No large focal consolidation.      Electronically signed by: Sual Garcia MD  Date:    06/09/2025  Time:    19:44               Narrative:    EXAMINATION:  XR CHEST AP PORTABLE    CLINICAL HISTORY:  Shortness of breath    TECHNIQUE:  Single frontal view of the chest was performed.    COMPARISON:  05/17/2025    FINDINGS:  The cardiomediastinal silhouette is not enlarged noting calcification of the aorta.  There is elevation of the right hemidiaphragm..  There is no pleural effusion.  The trachea is midline.  The lungs are  symmetrically expanded bilaterally with coarse interstitial attenuation, similar to the previous exam.  There is mild bilateral peribronchial thickening..  No large focal consolidation seen.  There is no pneumothorax.  The osseous structures are remarkable for degenerative change..

## 2025-06-10 NOTE — PLAN OF CARE
Inpatient Upgrade Note    Cecile Bowen has warranted treatment spanning two or more midnights of hospital level care for the management of UTI with Hematuria, SOB. She continues to require IV antibiotics, daily labs, supplemental oxygen, further testing/imaging, monitoring of vital signs, medication adjustments, further evaluation by consultants, and Nebs. Her condition is also complicated by the following comorbidities: HTN, HLD, hypothyroidism, DM2, migraines, anemia, fibromyalgia, insomnia, depression, chronic pain, VIJAYA, urinary retention s/p suprapubic catheter placement, and ESRD on HD (MWF.

## 2025-06-11 ENCOUNTER — DOCUMENT SCAN (OUTPATIENT)
Dept: HOME HEALTH SERVICES | Facility: HOSPITAL | Age: 75
End: 2025-06-11
Payer: MEDICARE

## 2025-06-11 LAB
ABSOLUTE EOSINOPHIL (OHS): 0 K/UL
ABSOLUTE MONOCYTE (OHS): 0.22 K/UL (ref 0.3–1)
ABSOLUTE NEUTROPHIL COUNT (OHS): 5.31 K/UL (ref 1.8–7.7)
ALBUMIN SERPL BCP-MCNC: 2.5 G/DL (ref 3.5–5.2)
ALP SERPL-CCNC: 114 UNIT/L (ref 40–150)
ALT SERPL W/O P-5'-P-CCNC: 11 UNIT/L (ref 10–44)
ANION GAP (OHS): 12 MMOL/L (ref 8–16)
AST SERPL-CCNC: 12 UNIT/L (ref 11–45)
BASOPHILS # BLD AUTO: 0.01 K/UL
BASOPHILS NFR BLD AUTO: 0.2 %
BILIRUB SERPL-MCNC: 0.2 MG/DL (ref 0.1–1)
BUN SERPL-MCNC: 35 MG/DL (ref 8–23)
CALCIUM SERPL-MCNC: 7.7 MG/DL (ref 8.7–10.5)
CHLORIDE SERPL-SCNC: 98 MMOL/L (ref 95–110)
CO2 SERPL-SCNC: 17 MMOL/L (ref 23–29)
CREAT SERPL-MCNC: 3.1 MG/DL (ref 0.5–1.4)
ERYTHROCYTE [DISTWIDTH] IN BLOOD BY AUTOMATED COUNT: 14.9 % (ref 11.5–14.5)
GENTAMICIN SERPL-MCNC: 1.2 UG/ML (ref ?–25)
GFR SERPLBLD CREATININE-BSD FMLA CKD-EPI: 15 ML/MIN/1.73/M2
GLUCOSE SERPL-MCNC: 195 MG/DL (ref 70–110)
HCT VFR BLD AUTO: 27.7 % (ref 37–48.5)
HGB BLD-MCNC: 9 GM/DL (ref 12–16)
IMM GRANULOCYTES # BLD AUTO: 0.13 K/UL (ref 0–0.04)
IMM GRANULOCYTES NFR BLD AUTO: 2 % (ref 0–0.5)
LYMPHOCYTES # BLD AUTO: 0.75 K/UL (ref 1–4.8)
MAGNESIUM SERPL-MCNC: 2 MG/DL (ref 1.6–2.6)
MCH RBC QN AUTO: 29.5 PG (ref 27–31)
MCHC RBC AUTO-ENTMCNC: 32.5 G/DL (ref 32–36)
MCV RBC AUTO: 91 FL (ref 82–98)
NUCLEATED RBC (/100WBC) (OHS): 0 /100 WBC
PHOSPHATE SERPL-MCNC: 5 MG/DL (ref 2.7–4.5)
PLATELET # BLD AUTO: 238 K/UL (ref 150–450)
PMV BLD AUTO: 9.6 FL (ref 9.2–12.9)
POCT GLUCOSE: 154 MG/DL (ref 70–110)
POCT GLUCOSE: 207 MG/DL (ref 70–110)
POCT GLUCOSE: 291 MG/DL (ref 70–110)
POCT GLUCOSE: 336 MG/DL (ref 70–110)
POTASSIUM SERPL-SCNC: 3.8 MMOL/L (ref 3.5–5.1)
PROT SERPL-MCNC: 5.9 GM/DL (ref 6–8.4)
RBC # BLD AUTO: 3.05 M/UL (ref 4–5.4)
RELATIVE EOSINOPHIL (OHS): 0 %
RELATIVE LYMPHOCYTE (OHS): 11.7 % (ref 18–48)
RELATIVE MONOCYTE (OHS): 3.4 % (ref 4–15)
RELATIVE NEUTROPHIL (OHS): 82.7 % (ref 38–73)
SODIUM SERPL-SCNC: 127 MMOL/L (ref 136–145)
WBC # BLD AUTO: 6.42 K/UL (ref 3.9–12.7)

## 2025-06-11 PROCEDURE — 25000003 PHARM REV CODE 250: Mod: HCNC

## 2025-06-11 PROCEDURE — 99900035 HC TECH TIME PER 15 MIN (STAT): Mod: HCNC

## 2025-06-11 PROCEDURE — 80170 ASSAY OF GENTAMICIN: CPT | Mod: HCNC | Performed by: STUDENT IN AN ORGANIZED HEALTH CARE EDUCATION/TRAINING PROGRAM

## 2025-06-11 PROCEDURE — 36415 COLL VENOUS BLD VENIPUNCTURE: CPT | Mod: HCNC | Performed by: NURSE PRACTITIONER

## 2025-06-11 PROCEDURE — 27000221 HC OXYGEN, UP TO 24 HOURS: Mod: HCNC

## 2025-06-11 PROCEDURE — 80100016 HC MAINTENANCE HEMODIALYSIS: Mod: HCNC

## 2025-06-11 PROCEDURE — 36415 COLL VENOUS BLD VENIPUNCTURE: CPT | Mod: HCNC | Performed by: STUDENT IN AN ORGANIZED HEALTH CARE EDUCATION/TRAINING PROGRAM

## 2025-06-11 PROCEDURE — 25000003 PHARM REV CODE 250: Mod: HCNC | Performed by: NURSE PRACTITIONER

## 2025-06-11 PROCEDURE — 90935 HEMODIALYSIS ONE EVALUATION: CPT | Mod: HCNC,,, | Performed by: NURSE PRACTITIONER

## 2025-06-11 PROCEDURE — 94761 N-INVAS EAR/PLS OXIMETRY MLT: CPT | Mod: HCNC

## 2025-06-11 PROCEDURE — 63600175 PHARM REV CODE 636 W HCPCS: Mod: HCNC | Performed by: NURSE PRACTITIONER

## 2025-06-11 PROCEDURE — 94640 AIRWAY INHALATION TREATMENT: CPT | Mod: HCNC

## 2025-06-11 PROCEDURE — 5A1D70Z PERFORMANCE OF URINARY FILTRATION, INTERMITTENT, LESS THAN 6 HOURS PER DAY: ICD-10-PCS | Performed by: STUDENT IN AN ORGANIZED HEALTH CARE EDUCATION/TRAINING PROGRAM

## 2025-06-11 PROCEDURE — 80053 COMPREHEN METABOLIC PANEL: CPT | Mod: HCNC | Performed by: NURSE PRACTITIONER

## 2025-06-11 PROCEDURE — 11000001 HC ACUTE MED/SURG PRIVATE ROOM: Mod: HCNC

## 2025-06-11 PROCEDURE — 85025 COMPLETE CBC W/AUTO DIFF WBC: CPT | Mod: HCNC | Performed by: NURSE PRACTITIONER

## 2025-06-11 PROCEDURE — 83735 ASSAY OF MAGNESIUM: CPT | Mod: HCNC | Performed by: NURSE PRACTITIONER

## 2025-06-11 PROCEDURE — 84100 ASSAY OF PHOSPHORUS: CPT | Mod: HCNC | Performed by: NURSE PRACTITIONER

## 2025-06-11 PROCEDURE — 25000242 PHARM REV CODE 250 ALT 637 W/ HCPCS: Mod: HCNC | Performed by: NURSE PRACTITIONER

## 2025-06-11 PROCEDURE — 63600175 PHARM REV CODE 636 W HCPCS: Mod: HCNC | Performed by: STUDENT IN AN ORGANIZED HEALTH CARE EDUCATION/TRAINING PROGRAM

## 2025-06-11 RX ORDER — INSULIN GLARGINE 100 [IU]/ML
8 INJECTION, SOLUTION SUBCUTANEOUS NIGHTLY
Status: DISCONTINUED | OUTPATIENT
Start: 2025-06-11 | End: 2025-06-12 | Stop reason: HOSPADM

## 2025-06-11 RX ORDER — LIDOCAINE 50 MG/G
1 PATCH TOPICAL
Status: DISCONTINUED | OUTPATIENT
Start: 2025-06-11 | End: 2025-06-12 | Stop reason: HOSPADM

## 2025-06-11 RX ORDER — SODIUM CHLORIDE 9 MG/ML
INJECTION, SOLUTION INTRAVENOUS ONCE
Status: CANCELLED | OUTPATIENT
Start: 2025-06-11 | End: 2025-06-11

## 2025-06-11 RX ADMIN — AMLODIPINE BESYLATE 10 MG: 10 TABLET ORAL at 08:06

## 2025-06-11 RX ADMIN — CARVEDILOL 12.5 MG: 12.5 TABLET, FILM COATED ORAL at 08:06

## 2025-06-11 RX ADMIN — APIXABAN 5 MG: 5 TABLET, FILM COATED ORAL at 09:06

## 2025-06-11 RX ADMIN — FUROSEMIDE 80 MG: 80 TABLET ORAL at 08:06

## 2025-06-11 RX ADMIN — PREDNISONE 40 MG: 20 TABLET ORAL at 08:06

## 2025-06-11 RX ADMIN — INSULIN GLARGINE 8 UNITS: 100 INJECTION, SOLUTION SUBCUTANEOUS at 09:06

## 2025-06-11 RX ADMIN — DULOXETINE HYDROCHLORIDE 40 MG: 20 CAPSULE, DELAYED RELEASE ORAL at 08:06

## 2025-06-11 RX ADMIN — TRAZODONE HYDROCHLORIDE 100 MG: 50 TABLET ORAL at 09:06

## 2025-06-11 RX ADMIN — OXYBUTYNIN CHLORIDE 10 MG: 10 TABLET, EXTENDED RELEASE ORAL at 08:06

## 2025-06-11 RX ADMIN — GUAIFENESIN, DEXTROMETHORPHAN HBR 1 TABLET: 600; 30 TABLET ORAL at 08:06

## 2025-06-11 RX ADMIN — CARVEDILOL 12.5 MG: 12.5 TABLET, FILM COATED ORAL at 09:06

## 2025-06-11 RX ADMIN — HYDROCODONE BITARTRATE AND ACETAMINOPHEN 1 TABLET: 10; 325 TABLET ORAL at 09:06

## 2025-06-11 RX ADMIN — HYDROCODONE BITARTRATE AND ACETAMINOPHEN 1 TABLET: 10; 325 TABLET ORAL at 03:06

## 2025-06-11 RX ADMIN — METHOCARBAMOL 750 MG: 750 TABLET ORAL at 03:06

## 2025-06-11 RX ADMIN — ANASTROZOLE 1 MG: 1 TABLET, COATED ORAL at 08:06

## 2025-06-11 RX ADMIN — SENNOSIDES AND DOCUSATE SODIUM 1 TABLET: 50; 8.6 TABLET ORAL at 09:06

## 2025-06-11 RX ADMIN — INSULIN ASPART 2 UNITS: 100 INJECTION, SOLUTION INTRAVENOUS; SUBCUTANEOUS at 08:06

## 2025-06-11 RX ADMIN — SENNOSIDES AND DOCUSATE SODIUM 1 TABLET: 50; 8.6 TABLET ORAL at 08:06

## 2025-06-11 RX ADMIN — GUAIFENESIN, DEXTROMETHORPHAN HBR 1 TABLET: 600; 30 TABLET ORAL at 09:06

## 2025-06-11 RX ADMIN — ERYTHROPOIETIN 9800 UNITS: 10000 INJECTION, SOLUTION INTRAVENOUS; SUBCUTANEOUS at 12:06

## 2025-06-11 RX ADMIN — HYDROCODONE BITARTRATE AND ACETAMINOPHEN 1 TABLET: 10; 325 TABLET ORAL at 04:06

## 2025-06-11 RX ADMIN — LEVOTHYROXINE SODIUM 50 MCG: 0.05 TABLET ORAL at 05:06

## 2025-06-11 RX ADMIN — METHOCARBAMOL 750 MG: 750 TABLET ORAL at 09:06

## 2025-06-11 RX ADMIN — LOSARTAN POTASSIUM 100 MG: 50 TABLET, FILM COATED ORAL at 08:06

## 2025-06-11 RX ADMIN — IPRATROPIUM BROMIDE AND ALBUTEROL SULFATE 3 ML: 2.5; .5 SOLUTION RESPIRATORY (INHALATION) at 08:06

## 2025-06-11 RX ADMIN — INSULIN ASPART 3 UNITS: 100 INJECTION, SOLUTION INTRAVENOUS; SUBCUTANEOUS at 06:06

## 2025-06-11 RX ADMIN — APIXABAN 5 MG: 5 TABLET, FILM COATED ORAL at 08:06

## 2025-06-11 RX ADMIN — LIDOCAINE 1 PATCH: 50 PATCH CUTANEOUS at 09:06

## 2025-06-11 NOTE — SUBJECTIVE & OBJECTIVE
"Interval History: Patient examined at bedside who is resting comfortably. VSS, no acute distress. No overnight events reported. Patient feels "okay" this morning. Currently on 1.5L, adjusted to 1L during exam. Continue to attempt to ween back to RA. Patient mildly hyponatremic today, asymptomatic. HD this morning, monitor post HD. UC with GNR, pending ID and susceptibility      Review of Systems   Constitutional:  Negative for chills and fever.   Respiratory:  Positive for cough and shortness of breath (improving). Negative for chest tightness.    Cardiovascular:  Negative for chest pain and leg swelling.   Gastrointestinal:  Negative for abdominal pain and nausea.   Genitourinary:  Positive for hematuria.   Neurological:  Negative for dizziness and weakness.     Objective:     Vital Signs (Most Recent):  Temp: 98.2 °F (36.8 °C) (06/11/25 0738)  Pulse: 67 (06/11/25 1030)  Resp: 18 (06/11/25 0738)  BP: (!) 148/62 (06/11/25 1030)  SpO2: 100 % (06/11/25 0738) Vital Signs (24h Range):  Temp:  [97.7 °F (36.5 °C)-99.7 °F (37.6 °C)] 98.2 °F (36.8 °C)  Pulse:  [67-88] 67  Resp:  [16-19] 18  SpO2:  [95 %-100 %] 100 %  BP: (139-170)/(58-79) 148/62     Weight: 98.2 kg (216 lb 7.9 oz)  Body mass index is 32.92 kg/m².    Intake/Output Summary (Last 24 hours) at 6/11/2025 1040  Last data filed at 6/11/2025 0546  Gross per 24 hour   Intake 60 ml   Output 450 ml   Net -390 ml         Physical Exam  Vitals and nursing note reviewed.   Constitutional:       General: She is not in acute distress.     Appearance: Normal appearance.   HENT:      Head: Normocephalic and atraumatic.   Cardiovascular:      Rate and Rhythm: Normal rate and regular rhythm.      Heart sounds: Normal heart sounds. No murmur heard.  Pulmonary:      Effort: Pulmonary effort is normal. No respiratory distress.      Breath sounds: Normal breath sounds. No wheezing or rales.      Comments: On 1L NC  Abdominal:      General: Abdomen is flat.      Tenderness: There " is no abdominal tenderness.   Musculoskeletal:         General: Normal range of motion.      Right lower leg: No edema.      Left lower leg: No edema.   Skin:     General: Skin is warm and dry.   Neurological:      General: No focal deficit present.      Mental Status: She is alert. Mental status is at baseline.   Psychiatric:         Mood and Affect: Mood normal.         Behavior: Behavior normal.               Significant Labs: All pertinent labs within the past 24 hours have been reviewed.  CBC:   Recent Labs   Lab 06/09/25  1756 06/10/25  0731 06/11/25  0322   WBC 7.79 5.45 6.42   HGB 10.6* 11.1* 9.0*   HCT 34.0* 36.9* 27.7*    198 238     CMP:   Recent Labs   Lab 06/09/25  1756 06/10/25  0731 06/11/25  0322   * 130* 127*   K 4.0 3.9 3.8    99 98   CO2 22* 20* 17*   * 176* 195*   BUN 14 19 35*   CREATININE 1.8* 2.4* 3.1*   CALCIUM 8.4* 8.3* 7.7*   PROT 7.2 6.9 5.9*   ALBUMIN 3.0* 3.0* 2.5*   BILITOT 0.3 0.2 0.2   ALKPHOS 153* 150 114   AST 29 13 12   ALT 13 11 11   ANIONGAP 9 11 12       Significant Imaging: I have reviewed all pertinent imaging results/findings within the past 24 hours.

## 2025-06-11 NOTE — PROGRESS NOTES
HD TX completed.  Net fluid removed 2 liters.  VSS.  Tolerated Tx.  Needles removed from exit sites.  Pressure applied and hemostasis achieved.  Returning to 7th floor by stretcher.

## 2025-06-11 NOTE — PROGRESS NOTES
"[facility] [address] [place] [address] Medicine  Progress Note    Patient Name: Cecile Bowen  MRN: [id number]  Patient Class: IP- Inpatient   Admission Date: 6/9/2025  Length of Stay: 1 days  Attending Physician: Sabina Carranza MD  Primary Care Provider: Lurdes Navarro MD        Subjective     Principal Problem:UTI (urinary tract infection)        HPI:  Cecile Bowen is a 74 y.o. female with a PMHx of HTN, HLD, hypothyroidism, DM2, migraines, anemia, fibromyalgia, insomnia, depression, chronic pain, VIJAYA, urinary retention s/p suprapubic catheter placement, and ESRD on HD (MWF) who presents for evaluation of hematuria, shortness of breath, and cough. She reports shortness of breath, intermittent wheezing, and cough productive of "greyish" sputum x6-7 days. Notes she has had this issue several times in the past and thought she may have some extra fluid. She went to her dialysis appointment today and had a 4 hour session completed. She reports she was still having the symptoms after HD and is under her usual dry weight. She also notes hematuria beginning this morning. Reports she typically has this when she is getting a UTI. She denies abdominal pain, fever/chills, N/VD, CP, congestion, or sore throat. She does endorse BLE edema, right leg greater than left, that is chronic and better than baseline. Pt reports she is wheelchair bound at baseline and uses a christina lift to transfer.    In the ED, VSS, afebrile. CBC with stable anemia. Na 133. Bicarb 22. Glucose 120. Cr 1.8 (hx of ESRD). Calcium 8.4. Albumin 3.0. . . HS troponin 14. EKG shows SR w/ PACs, 78 bpm, no ST elevation. Flu/COVID negative. UA with 3+ leukocytes, >100 RBCs, >100 WBCs, and many bacteria; urine sample obtained from chronic suprapubic catheter that was last changed about 4 weeks ago. Will have nursing exchange catheter and obtain new UA. CXR with pulmonary findings suggest edema, bronchial airway inflammation or infection " "not excluded. No large focal consolidation. The patient received a duo neb treatment, prednisone 40mg, and keflex.    Overview/Hospital Course:  Patient admitted to hospital medicine for SOB and UTI. Patient SOB 2/2 volume overload as she recently ran out of her lasix. Nephrology consulted for HD. Continued lasix, attempt to ween O2 back to RA as tolerated. Patient noted to have UTI, HX of ESBL and MDRO. Started gentamicin for now based on previous cultures. Cultures with GNR, ID and susceptibility pending    Interval History: Patient examined at bedside who is resting comfortably. VSS, no acute distress. No overnight events reported. Patient feels "okay" this morning. Currently on 1.5L, adjusted to 1L during exam. Continue to attempt to ween back to RA. Patient mildly hyponatremic today, asymptomatic. HD this morning, monitor post HD. UC with GNR, pending ID and susceptibility      Review of Systems   Constitutional:  Negative for chills and fever.   Respiratory:  Positive for cough and shortness of breath (improving). Negative for chest tightness.    Cardiovascular:  Negative for chest pain and leg swelling.   Gastrointestinal:  Negative for abdominal pain and nausea.   Genitourinary:  Positive for hematuria.   Neurological:  Negative for dizziness and weakness.     Objective:     Vital Signs (Most Recent):  Temp: 98.2 °F (36.8 °C) (06/11/25 0738)  Pulse: 67 (06/11/25 1030)  Resp: 18 (06/11/25 0738)  BP: (!) 148/62 (06/11/25 1030)  SpO2: 100 % (06/11/25 0738) Vital Signs (24h Range):  Temp:  [97.7 °F (36.5 °C)-99.7 °F (37.6 °C)] 98.2 °F (36.8 °C)  Pulse:  [67-88] 67  Resp:  [16-19] 18  SpO2:  [95 %-100 %] 100 %  BP: (139-170)/(58-79) 148/62     Weight: 98.2 kg (216 lb 7.9 oz)  Body mass index is 32.92 kg/m².    Intake/Output Summary (Last 24 hours) at 6/11/2025 1040  Last data filed at 6/11/2025 0546  Gross per 24 hour   Intake 60 ml   Output 450 ml   Net -390 ml         Physical Exam  Vitals and nursing note " reviewed.   Constitutional:       General: She is not in acute distress.     Appearance: Normal appearance.   HENT:      Head: Normocephalic and atraumatic.   Cardiovascular:      Rate and Rhythm: Normal rate and regular rhythm.      Heart sounds: Normal heart sounds. No murmur heard.  Pulmonary:      Effort: Pulmonary effort is normal. No respiratory distress.      Breath sounds: Normal breath sounds. No wheezing or rales.      Comments: On 1L NC  Abdominal:      General: Abdomen is flat.      Tenderness: There is no abdominal tenderness.   Musculoskeletal:         General: Normal range of motion.      Right lower leg: No edema.      Left lower leg: No edema.   Skin:     General: Skin is warm and dry.   Neurological:      General: No focal deficit present.      Mental Status: She is alert. Mental status is at baseline.   Psychiatric:         Mood and Affect: Mood normal.         Behavior: Behavior normal.               Significant Labs: All pertinent labs within the past 24 hours have been reviewed.  CBC:   Recent Labs   Lab 06/09/25  1756 06/10/25  0731 06/11/25  0322   WBC 7.79 5.45 6.42   HGB 10.6* 11.1* 9.0*   HCT 34.0* 36.9* 27.7*    198 238     CMP:   Recent Labs   Lab 06/09/25  1756 06/10/25  0731 06/11/25  0322   * 130* 127*   K 4.0 3.9 3.8    99 98   CO2 22* 20* 17*   * 176* 195*   BUN 14 19 35*   CREATININE 1.8* 2.4* 3.1*   CALCIUM 8.4* 8.3* 7.7*   PROT 7.2 6.9 5.9*   ALBUMIN 3.0* 3.0* 2.5*   BILITOT 0.3 0.2 0.2   ALKPHOS 153* 150 114   AST 29 13 12   ALT 13 11 11   ANIONGAP 9 11 12       Significant Imaging: I have reviewed all pertinent imaging results/findings within the past 24 hours.      Assessment & Plan  Shortness of breath  Pt reports shortness of breath and productive cough x6-7 days. Intermittent wheezing. Denies rhinorrhea, sore throat, fever, or chills. Possible bronchitis or undiagnosed COPD or hypervolemia; pt reports she has never been a smoker but notes her  father and brother that she previously lived with did smoke.  -Afebrile, no leukocytosis.  -  -CXR with pulmonary findings suggest edema, bronchial airway inflammation or infection not excluded. No large focal consolidation.   -CT chest with scatter groudn glass opacities, enlargement of pulmonary trunk  -Sputum cx pending.  -Flu/COVID negative.  -Duo nebs q6h while awake and PRN.   -Will start mucinex DM and prednisone 40mg qd  -PFTs ordered outpatient but have not been completed.  - likely 2/2 volume overload   - Currently on 1L, ween as tolerated  UTI (urinary tract infection)  Hematuria  Chronic suprapubic catheter  -Pt reports hematuria starting today. Denies abdominal pain or fever/chills.  -Afebrile, no leukocytosis; 0/4 SIRS  -Initial UA obtained from chronic suprapubic murillo, concern for contaminated sample given catheter has been in place for almost 4 weeks.  -Nursing exchanged suprapubic catheter 6/9.  -Repeat UA infectious  -start gentamicin based on previous cultures  -Suprapubic catheter care per nursing.  - preliminary with GNR, ID and susceptibility pending  Hypothyroidism  Patient has chronic hypothyroidism. TFTs reviewed-   Lab Results   Component Value Date    TSH 1.631 12/31/2024   . Will continue chronic levothyroxine and adjust for and clinical changes.  Hyperlipidemia   Patient is not chronically on statin.Monitor clinically. Last LDL was   Lab Results   Component Value Date    LDLCALC 85 04/22/2024     VIJAYA (obstructive sleep apnea)  -Reports history of PTSD so unable to tolerate CPAP.  Major depressive disorder, recurrent, moderate  Patient has recurrent depression which is moderate and is currently controlled. Will Continue anti-depressant medications. We will not consult psychiatry at this time. Patient does not display psychosis at this time. Continue to monitor closely and adjust plan of care as needed.  -Continue home cymbalta and trazodone.  Insomnia  -Chronic, stable.  -Continue  trazodone.  Class 1 obesity due to excess calories with serious comorbidity in adult  Body mass index is 32.92 kg/m². Obesity complicates all aspects of disease management from diagnostic modalities to treatment. Weight loss encouraged and health benefits explained to patient.   Chronic daily headache  -Chronic issue.   -Continue PRN tylenol and sumatriptan.  Chronic pain  Fibromyalgia  -Chronic, controlled.  - reviewed, continue home norco, oxy and robaxin prn  Malignant neoplasm of left breast, estrogen receptor positive  Prophylactic use of anastrozole (Arimidex)  Grade I invasive ductal carcinoma ER+, [place]+ s/p radical mastectomy with sentinel lymph node bx   -Follows with oncology  -Continue anastrazole  Anemia in ESRD (end-stage renal disease)  Anemia is likely due to chronic disease due to ESRD. Most recent hemoglobin and hematocrit are listed below.  Recent Labs     06/09/25  1756 06/10/25  0731 06/11/25  0322   HGB 10.6* 11.1* 9.0*   HCT 34.0* 36.9* 27.7*     Plan  - Monitor serial CBC: Daily  - Transfuse PRBC if patient becomes hemodynamically unstable, symptomatic or H/H drops below 7/21.  - Patient has not received any PRBC transfusions to date  - Patient's anemia is currently stable  ESRD (end stage renal disease) on dialysis  MWF schedule, last dialyzed 6/9  Residual renal function?- Yes    - Nephrology consulted  - Continue chronic hemodialysis  - Monitor daily electrolytes and defer dialysis orders to nephrology  - Renally dose medications  - Renal diet  - Continue phosphorus binders  - Daily weights/strict I&Os  - 1.5L FR  Primary hypertension  Patient's blood pressure range in the last 24 hours was: BP  Min: 139/62  Max: 170/79.The patient's inpatient anti-hypertensive regimen is listed below:  Current Antihypertensives  amLODIPine tablet 10 mg, Daily, Oral  carvediloL tablet 12.5 mg, 2 times daily, Oral  furosemide tablet 80 mg, Daily, Oral  losartan tablet 100 mg, Daily, Oral    Plan  - BP is  controlled, no changes needed to their regimen  Type 2 diabetes mellitus with kidney complication, with long-term current use of insulin  Patient's FSGs are controlled on current medication regimen.  Last A1c reviewed-   Lab Results   Component Value Date    HGBA1C 5.4 04/16/2025     Most recent fingerstick glucose reviewed-   Recent Labs   Lab 06/10/25  1124 06/10/25  1533 06/10/25  2104 06/11/25  0738   POCTGLUCOSE 220* 241* 235* 207*     Current correctional scale  Low  Maintain anti-hyperglycemic dose as follows-   Antihyperglycemics (From admission, onward)      Start     Stop Route Frequency Ordered    06/11/25 2100  insulin glargine U-100 (Lantus) pen 8 Units         -- SubQ Nightly 06/11/25 0926 06/09/25 2136  insulin aspart U-100 pen 0-5 Units         -- SubQ Before meals & nightly PRN 06/09/25 2037          Hold Oral hypoglycemics while patient is in the hospital.  -Accuchecks AC/HS  Unspecified atrial fibrillation  Patient has paroxysmal (<7 days) atrial fibrillation. Patient is currently in sinus rhythm. [id number] Score: 3. The patients heart rate in the last 24 hours is as follows:  Pulse  Min: 67  Max: 88     Antiarrhythmics   coreg 12.5mg, bid, oral    Anticoagulants  apixaban tablet 5 mg, 2 times daily, Oral    Plan  - Replete lytes with a goal of K>4, Mg >2  - Patient is anticoagulated, see medications listed above.  - Patient's afib is currently controlled  Hyponatremia  Hyponatremia is likely due to hyervolemia. The patient's most recent sodium results are listed below.  Recent Labs     06/09/25  1756 06/10/25  0731 06/11/25  0322   * 130* 127*     Plan  - currently asymptomatic  - HD later today  - monitor daily  VTE Risk Mitigation (From admission, onward)           Ordered     apixaban tablet 5 mg  2 times daily         06/09/25 2037     IP VTE HIGH RISK PATIENT  Once         06/09/25 2037     Place sequential compression device  Until discontinued         06/09/25 2037                     Discharge Planning   FLORENTINO: 6/12/2025     Code Status: Full Code   Medical Readiness for Discharge Date:   Discharge Plan A: Home Health                        Fletcher Bowen PA-C  Department of Hospital Medicine   [facility] - [facility] 11H

## 2025-06-11 NOTE — ASSESSMENT & PLAN NOTE
Body mass index is 32.92 kg/m². Obesity complicates all aspects of disease management from diagnostic modalities to treatment. Weight loss encouraged and health benefits explained to patient.

## 2025-06-11 NOTE — ASSESSMENT & PLAN NOTE
Hyponatremia is likely due to hyervolemia. The patient's most recent sodium results are listed below.  Recent Labs     06/09/25  1756 06/10/25  0731 06/11/25  0322   * 130* 127*     Plan  - currently asymptomatic  - HD later today  - monitor daily

## 2025-06-11 NOTE — ASSESSMENT & PLAN NOTE
Anemia is likely due to chronic disease due to ESRD. Most recent hemoglobin and hematocrit are listed below.  Recent Labs     06/09/25  1756 06/10/25  0731 06/11/25  0322   HGB 10.6* 11.1* 9.0*   HCT 34.0* 36.9* 27.7*     Plan  - Monitor serial CBC: Daily  - Transfuse PRBC if patient becomes hemodynamically unstable, symptomatic or H/H drops below 7/21.  - Patient has not received any PRBC transfusions to date  - Patient's anemia is currently stable

## 2025-06-11 NOTE — PROGRESS NOTES
Patient arrived by stretcher.  Pt awake, alert and responsive.  Vss.  HD maintenance TX  MWF started via JENNY using 16 g needles.

## 2025-06-11 NOTE — PROGRESS NOTES
Pharmacokinetic Follow Up: Gentamicin    Regimen Plan:   -Day 2 of therapy, no EOT as of yet.   -UCx 6/9 & 6/10 with GNR, pending speciation.   -Level drawn ~3 hrs post-iHD session and above goal (<1 mcg/mL).   -Will hold dosing for today and get another gentamicin random on 6/12 @0430 with AM labs.   -Re-dose with an additional 1.5 mg/kg using AdjBW (116 mg) once level is <1 mcg/mL.     Recent Labs   Lab Result Units 06/11/25  1625   Gentamicin, Random ug/mL 1.2       Pharmacy will continue to monitor.    Please contact pharmacy at extension [phone] with any questions regarding this assessment.    Thank you for the consult,   Tony Jeong      Patient brief summary:  Cecile Bowen is a 74 y.o. female initiated on aminoglycoside therapy for treatment of urinary tract infection    Drug Allergies:   Review of patient's allergies indicates:  No Known Allergies    Actual Body Weight:   98.2 kg    Adjust Body Weight:   77.6 kg    Ideal Body Weight:  63.9 kg    Renal Function:   Estimated Creatinine Clearance: 20.1 mL/min (A) (based on SCr of 3.1 mg/dL (H)).,     Dialysis Method (if applicable):  intermittent HD MWF

## 2025-06-11 NOTE — ASSESSMENT & PLAN NOTE
Grade I invasive ductal carcinoma ER+, [place]+ s/p radical mastectomy with sentinel lymph node bx   -Follows with oncology  -Continue anastrazole

## 2025-06-11 NOTE — ASSESSMENT & PLAN NOTE
Patient's blood pressure range in the last 24 hours was: BP  Min: 139/62  Max: 170/79.The patient's inpatient anti-hypertensive regimen is listed below:  Current Antihypertensives  amLODIPine tablet 10 mg, Daily, Oral  carvediloL tablet 12.5 mg, 2 times daily, Oral  furosemide tablet 80 mg, Daily, Oral  losartan tablet 100 mg, Daily, Oral    Plan  - BP is controlled, no changes needed to their regimen

## 2025-06-11 NOTE — ASSESSMENT & PLAN NOTE
Pt reports shortness of breath and productive cough x6-7 days. Intermittent wheezing. Denies rhinorrhea, sore throat, fever, or chills. Possible bronchitis or undiagnosed COPD or hypervolemia; pt reports she has never been a smoker but notes her father and brother that she previously lived with did smoke.  -Afebrile, no leukocytosis.  -  -CXR with pulmonary findings suggest edema, bronchial airway inflammation or infection not excluded. No large focal consolidation.   -CT chest with scatter groudn glass opacities, enlargement of pulmonary trunk  -Sputum cx pending.  -Flu/COVID negative.  -Duo nebs q6h while awake and PRN.   -Will start mucinex DM and prednisone 40mg qd  -PFTs ordered outpatient but have not been completed.  - likely 2/2 volume overload   - Currently on 1L, ween as tolerated

## 2025-06-11 NOTE — DIALYSIS ROUNDING
[facility] H[facility] NOTE    Cecile Bowen is a 74 y.o. female currently on hemodialysis for removal of uremic toxins and volume.     Patient seen and evaluated on hemodialysis, tolerating treatment, see HD flowsheet for vitals and assessments.    No Hypotension, chest pain, shortness of breath, cramping, nausea or vomiting.      Labs have been reviewed and the dialysate bath has been adjusted.     Labs:     Recent Labs   Lab 06/09/25  1756 06/10/25  0731 06/11/25  0322   * 130* 127*   K 4.0 3.9 3.8    99 98   CO2 22* 20* 17*   BUN 14 19 35*   CREATININE 1.8* 2.4* 3.1*   CALCIUM 8.4* 8.3* 7.7*   PHOS 3.5 4.4 5.0*     Recent Labs   Lab 06/09/25  1756 06/10/25  0731 06/11/25  0322   WBC 7.79 5.45 6.42   HGB 10.6* 11.1* 9.0*   HCT 34.0* 36.9* 27.7*    198 238     Lab Results   Component Value Date    FESATURATED 24 08/10/2023    FERRITIN 497 (H) 06/04/2025       Following patient for the management of ESRD     Assessment/Plan      Ultrafiltration goal: Liters: 3L. Duration: 3.5 hrs    Monday/Wednesday/Friday    - Seen on dialysis today, tolerating session with current UFR, no complications.    - Admitted to  with SOB and UTI.   - Encouraged patient to limit fluid intake to 32 oz per day.   - No lab stick/BP intake on access site  - Continue to monitor intake and output, daily weights   - Please avoid gadolinium, fleets, phos-based laxatives, NSAIDs  - Will follow closely and continue dialysis treatments while in-patient    Anemia  - Hgb 9.0, will start LUIS with dialysis treatments    BMM  - Renal diet with protein intake goal 1.5 g/kg/d if appropriate   - Novasource with meals   - F/U PO4, Mg, Calcium. And albumin levels.   - Phos 5.0. Will consider binders.       Mayra Porter, MUNA, APRN, FNP-C  Nephrology Department  Pager:  [phone]

## 2025-06-11 NOTE — ASSESSMENT & PLAN NOTE
-Pt reports hematuria starting today. Denies abdominal pain or fever/chills.  -Afebrile, no leukocytosis; 0/4 SIRS  -Initial UA obtained from chronic suprapubic murillo, concern for contaminated sample given catheter has been in place for almost 4 weeks.  -Nursing exchanged suprapubic catheter 6/9.  -Repeat UA infectious  -start gentamicin based on previous cultures  -Suprapubic catheter care per nursing.  - preliminary with GNR, ID and susceptibility pending

## 2025-06-11 NOTE — ASSESSMENT & PLAN NOTE
Patient's FSGs are controlled on current medication regimen.  Last A1c reviewed-   Lab Results   Component Value Date    HGBA1C 5.4 04/16/2025     Most recent fingerstick glucose reviewed-   Recent Labs   Lab 06/10/25  1124 06/10/25  1533 06/10/25  2104 06/11/25  0738   POCTGLUCOSE 220* 241* 235* 207*     Current correctional scale  Low  Maintain anti-hyperglycemic dose as follows-   Antihyperglycemics (From admission, onward)      Start     Stop Route Frequency Ordered    06/11/25 2100  insulin glargine U-100 (Lantus) pen 8 Units         -- SubQ Nightly 06/11/25 0926    06/09/25 2136  insulin aspart U-100 pen 0-5 Units         -- SubQ Before meals & nightly PRN 06/09/25 2037          Hold Oral hypoglycemics while patient is in the hospital.  -[place]uchleigha AC/HS

## 2025-06-11 NOTE — ASSESSMENT & PLAN NOTE
Patient has paroxysmal (<7 days) atrial fibrillation. Patient is currently in sinus rhythm. [id number] Score: 3. The patients heart rate in the last 24 hours is as follows:  Pulse  Min: 67  Max: 88     Antiarrhythmics   coreg 12.5mg, bid, oral    Anticoagulants  apixaban tablet 5 mg, 2 times daily, Oral    Plan  - Replete lytes with a goal of K>4, Mg >2  - Patient is anticoagulated, see medications listed above.  - Patient's afib is currently controlled

## 2025-06-11 NOTE — ASSESSMENT & PLAN NOTE
Grade I invasive ductal carcinoma ER+, [place]+ s/p radical mastectomy with sentinel lymph node bx   -Follows with oncology  -Continue anastrazole

## 2025-06-12 ENCOUNTER — TELEPHONE (OUTPATIENT)
Dept: INFECTIOUS DISEASES | Facility: CLINIC | Age: 75
End: 2025-06-12
Payer: MEDICARE

## 2025-06-12 VITALS
HEART RATE: 81 BPM | BODY MASS INDEX: 32.81 KG/M2 | SYSTOLIC BLOOD PRESSURE: 145 MMHG | TEMPERATURE: 98 F | OXYGEN SATURATION: 96 % | WEIGHT: 216.5 LBS | HEIGHT: 68 IN | DIASTOLIC BLOOD PRESSURE: 88 MMHG | RESPIRATION RATE: 18 BRPM

## 2025-06-12 LAB
ABSOLUTE EOSINOPHIL (OHS): 0 K/UL
ABSOLUTE MONOCYTE (OHS): 0.71 K/UL (ref 0.3–1)
ABSOLUTE NEUTROPHIL COUNT (OHS): 7.2 K/UL (ref 1.8–7.7)
ALBUMIN SERPL BCP-MCNC: 2.8 G/DL (ref 3.5–5.2)
ALP SERPL-CCNC: 109 UNIT/L (ref 40–150)
ALT SERPL W/O P-5'-P-CCNC: 12 UNIT/L (ref 10–44)
ANION GAP (OHS): 11 MMOL/L (ref 8–16)
AST SERPL-CCNC: 27 UNIT/L (ref 11–45)
BASOPHILS # BLD AUTO: 0.02 K/UL
BASOPHILS NFR BLD AUTO: 0.2 %
BILIRUB SERPL-MCNC: 0.2 MG/DL (ref 0.1–1)
BUN SERPL-MCNC: 33 MG/DL (ref 8–23)
CALCIUM SERPL-MCNC: 7.7 MG/DL (ref 8.7–10.5)
CHLORIDE SERPL-SCNC: 99 MMOL/L (ref 95–110)
CO2 SERPL-SCNC: 18 MMOL/L (ref 23–29)
CREAT SERPL-MCNC: 2.8 MG/DL (ref 0.5–1.4)
ERYTHROCYTE [DISTWIDTH] IN BLOOD BY AUTOMATED COUNT: 15.5 % (ref 11.5–14.5)
GENTAMICIN SERPL-MCNC: 1.1 UG/ML (ref ?–25)
GFR SERPLBLD CREATININE-BSD FMLA CKD-EPI: 17 ML/MIN/1.73/M2
GLUCOSE SERPL-MCNC: 199 MG/DL (ref 70–110)
HCT VFR BLD AUTO: 30.2 % (ref 37–48.5)
HGB BLD-MCNC: 9.5 GM/DL (ref 12–16)
IMM GRANULOCYTES # BLD AUTO: 0.05 K/UL (ref 0–0.04)
IMM GRANULOCYTES NFR BLD AUTO: 0.5 % (ref 0–0.5)
LYMPHOCYTES # BLD AUTO: 1.33 K/UL (ref 1–4.8)
MAGNESIUM SERPL-MCNC: 1.9 MG/DL (ref 1.6–2.6)
MCH RBC QN AUTO: 30.2 PG (ref 27–31)
MCHC RBC AUTO-ENTMCNC: 31.5 G/DL (ref 32–36)
MCV RBC AUTO: 96 FL (ref 82–98)
NUCLEATED RBC (/100WBC) (OHS): 0 /100 WBC
PHOSPHATE SERPL-MCNC: 3.9 MG/DL (ref 2.7–4.5)
PLATELET # BLD AUTO: 263 K/UL (ref 150–450)
PMV BLD AUTO: 9.4 FL (ref 9.2–12.9)
POCT GLUCOSE: 143 MG/DL (ref 70–110)
POCT GLUCOSE: 235 MG/DL (ref 70–110)
POCT GLUCOSE: 279 MG/DL (ref 70–110)
POTASSIUM SERPL-SCNC: 4.1 MMOL/L (ref 3.5–5.1)
PROT SERPL-MCNC: 6.3 GM/DL (ref 6–8.4)
RBC # BLD AUTO: 3.15 M/UL (ref 4–5.4)
RELATIVE EOSINOPHIL (OHS): 0 %
RELATIVE LYMPHOCYTE (OHS): 14.3 % (ref 18–48)
RELATIVE MONOCYTE (OHS): 7.6 % (ref 4–15)
RELATIVE NEUTROPHIL (OHS): 77.4 % (ref 38–73)
SODIUM SERPL-SCNC: 128 MMOL/L (ref 136–145)
WBC # BLD AUTO: 9.31 K/UL (ref 3.9–12.7)

## 2025-06-12 PROCEDURE — 84100 ASSAY OF PHOSPHORUS: CPT | Mod: HCNC | Performed by: NURSE PRACTITIONER

## 2025-06-12 PROCEDURE — 25000003 PHARM REV CODE 250: Mod: HCNC

## 2025-06-12 PROCEDURE — 80170 ASSAY OF GENTAMICIN: CPT | Mod: HCNC | Performed by: STUDENT IN AN ORGANIZED HEALTH CARE EDUCATION/TRAINING PROGRAM

## 2025-06-12 PROCEDURE — G0545 PR VISIT INHERENT TO INPT OR OBS CARE, INFECTIOUS DISEASE: HCPCS | Mod: HCNC,,,

## 2025-06-12 PROCEDURE — 25000003 PHARM REV CODE 250: Mod: HCNC | Performed by: STUDENT IN AN ORGANIZED HEALTH CARE EDUCATION/TRAINING PROGRAM

## 2025-06-12 PROCEDURE — 85025 COMPLETE CBC W/AUTO DIFF WBC: CPT | Mod: HCNC | Performed by: NURSE PRACTITIONER

## 2025-06-12 PROCEDURE — 99232 SBSQ HOSP IP/OBS MODERATE 35: CPT | Mod: HCNC,,, | Performed by: NURSE PRACTITIONER

## 2025-06-12 PROCEDURE — 94640 AIRWAY INHALATION TREATMENT: CPT | Mod: HCNC

## 2025-06-12 PROCEDURE — 99900035 HC TECH TIME PER 15 MIN (STAT): Mod: HCNC

## 2025-06-12 PROCEDURE — 63600175 PHARM REV CODE 636 W HCPCS: Mod: HCNC

## 2025-06-12 PROCEDURE — 36415 COLL VENOUS BLD VENIPUNCTURE: CPT | Mod: HCNC | Performed by: STUDENT IN AN ORGANIZED HEALTH CARE EDUCATION/TRAINING PROGRAM

## 2025-06-12 PROCEDURE — 25000242 PHARM REV CODE 250 ALT 637 W/ HCPCS: Mod: HCNC | Performed by: NURSE PRACTITIONER

## 2025-06-12 PROCEDURE — 83735 ASSAY OF MAGNESIUM: CPT | Mod: HCNC | Performed by: NURSE PRACTITIONER

## 2025-06-12 PROCEDURE — 25000003 PHARM REV CODE 250: Mod: HCNC | Performed by: NURSE PRACTITIONER

## 2025-06-12 PROCEDURE — 94761 N-INVAS EAR/PLS OXIMETRY MLT: CPT | Mod: HCNC

## 2025-06-12 PROCEDURE — 36415 COLL VENOUS BLD VENIPUNCTURE: CPT | Mod: HCNC | Performed by: NURSE PRACTITIONER

## 2025-06-12 PROCEDURE — 63600175 PHARM REV CODE 636 W HCPCS: Mod: HCNC | Performed by: NURSE PRACTITIONER

## 2025-06-12 PROCEDURE — 99223 1ST HOSP IP/OBS HIGH 75: CPT | Mod: HCNC,,,

## 2025-06-12 PROCEDURE — 82040 ASSAY OF SERUM ALBUMIN: CPT | Mod: HCNC | Performed by: NURSE PRACTITIONER

## 2025-06-12 PROCEDURE — 27000221 HC OXYGEN, UP TO 24 HOURS: Mod: HCNC

## 2025-06-12 RX ORDER — SEVELAMER CARBONATE 800 MG/1
800 TABLET, FILM COATED ORAL
Status: DISCONTINUED | OUTPATIENT
Start: 2025-06-12 | End: 2025-06-12 | Stop reason: HOSPADM

## 2025-06-12 RX ORDER — MUPIROCIN 20 MG/G
OINTMENT TOPICAL 2 TIMES DAILY
Status: DISCONTINUED | OUTPATIENT
Start: 2025-06-12 | End: 2025-06-12 | Stop reason: HOSPADM

## 2025-06-12 RX ORDER — PREDNISONE 20 MG/1
40 TABLET ORAL DAILY
Start: 2025-06-13 | End: 2025-06-14

## 2025-06-12 RX ORDER — SODIUM CHLORIDE 9 MG/ML
INJECTION, SOLUTION INTRAVENOUS ONCE
Status: CANCELLED | OUTPATIENT
Start: 2025-06-13

## 2025-06-12 RX ADMIN — INSULIN ASPART 3 UNITS: 100 INJECTION, SOLUTION INTRAVENOUS; SUBCUTANEOUS at 04:06

## 2025-06-12 RX ADMIN — AMLODIPINE BESYLATE 10 MG: 10 TABLET ORAL at 08:06

## 2025-06-12 RX ADMIN — LIDOCAINE 1 PATCH: 50 PATCH CUTANEOUS at 04:06

## 2025-06-12 RX ADMIN — IPRATROPIUM BROMIDE AND ALBUTEROL SULFATE 3 ML: 2.5; .5 SOLUTION RESPIRATORY (INHALATION) at 02:06

## 2025-06-12 RX ADMIN — ANASTROZOLE 1 MG: 1 TABLET, COATED ORAL at 08:06

## 2025-06-12 RX ADMIN — DULOXETINE HYDROCHLORIDE 40 MG: 20 CAPSULE, DELAYED RELEASE ORAL at 08:06

## 2025-06-12 RX ADMIN — APIXABAN 5 MG: 5 TABLET, FILM COATED ORAL at 08:06

## 2025-06-12 RX ADMIN — INSULIN ASPART 2 UNITS: 100 INJECTION, SOLUTION INTRAVENOUS; SUBCUTANEOUS at 11:06

## 2025-06-12 RX ADMIN — PREDNISONE 40 MG: 20 TABLET ORAL at 08:06

## 2025-06-12 RX ADMIN — LOSARTAN POTASSIUM 100 MG: 50 TABLET, FILM COATED ORAL at 08:06

## 2025-06-12 RX ADMIN — FUROSEMIDE 80 MG: 80 TABLET ORAL at 08:06

## 2025-06-12 RX ADMIN — GUAIFENESIN, DEXTROMETHORPHAN HBR 1 TABLET: 600; 30 TABLET ORAL at 08:06

## 2025-06-12 RX ADMIN — SEVELAMER CARBONATE 800 MG: 800 TABLET, FILM COATED ORAL at 11:06

## 2025-06-12 RX ADMIN — ERTAPENEM 500 MG: 1 INJECTION INTRAMUSCULAR; INTRAVENOUS at 11:06

## 2025-06-12 RX ADMIN — IPRATROPIUM BROMIDE AND ALBUTEROL SULFATE 3 ML: 2.5; .5 SOLUTION RESPIRATORY (INHALATION) at 08:06

## 2025-06-12 RX ADMIN — SEVELAMER CARBONATE 800 MG: 800 TABLET, FILM COATED ORAL at 04:06

## 2025-06-12 RX ADMIN — MUPIROCIN: 20 OINTMENT TOPICAL at 11:06

## 2025-06-12 RX ADMIN — OXYBUTYNIN CHLORIDE 10 MG: 10 TABLET, EXTENDED RELEASE ORAL at 08:06

## 2025-06-12 RX ADMIN — LEVOTHYROXINE SODIUM 50 MCG: 0.05 TABLET ORAL at 06:06

## 2025-06-12 RX ADMIN — HYDROCODONE BITARTRATE AND ACETAMINOPHEN 1 TABLET: 10; 325 TABLET ORAL at 06:06

## 2025-06-12 RX ADMIN — CARVEDILOL 12.5 MG: 12.5 TABLET, FILM COATED ORAL at 08:06

## 2025-06-12 NOTE — SUBJECTIVE & OBJECTIVE
Interval History: HD yesterday with 2 L removed. No distress on exam.     Review of patient's allergies indicates:  No Known Allergies  Current Facility-Administered Medications   Medication Frequency    acetaminophen tablet 650 mg Q6H PRN    albuterol-ipratropium 2.5 mg-0.5 mg/3 mL nebulizer solution 3 mL Q6H WAKE    amLODIPine tablet 10 mg Daily    anastrozole tablet 1 mg Daily    apixaban tablet 5 mg BID    carvediloL tablet 12.5 mg BID    dextromethorphan-guaiFENesin  mg per 12 hr tablet 1 tablet BID    dextrose 50% injection 12.5 g PRN    dextrose 50% injection 25 g PRN    DULoxetine DR capsule 40 mg Daily    epoetin chloe injection 9,800 Units Every Mon, Wed, Fri    ertapenem (INVANZ) 500 mg in 0.9% NaCl 100 mL IVPB Q24H    furosemide tablet 80 mg Daily    glucagon (human recombinant) injection 1 mg PRN    glucose chewable tablet 16 g PRN    glucose chewable tablet 24 g PRN    guaiFENesin 100 mg/5 ml syrup 200 mg Q4H PRN    HYDROcodone-acetaminophen  mg per tablet 1 tablet Q6H PRN    insulin aspart U-100 pen 0-5 Units QID (AC + HS) PRN    insulin glargine U-100 (Lantus) pen 8 Units QHS    lactulose 20 gram/30 mL solution Soln 20 g BID PRN    levothyroxine tablet 50 mcg Before breakfast    LIDOcaine 5 % patch 1 patch Q24H    losartan tablet 100 mg Daily    methocarbamoL tablet 750 mg TID PRN    mupirocin 2 % ointment BID    naloxone 0.4 mg/mL injection 0.02 mg PRN    ondansetron injection 4 mg Q8H PRN    oxybutynin 24 hr tablet 10 mg Daily    oxyCODONE immediate release tablet Tab 10 mg Daily PRN    polyethylene glycol packet 17 g Daily    predniSONE tablet 40 mg Daily    senna-docusate 8.6-50 mg per tablet 1 tablet BID    sevelamer carbonate tablet 800 mg TID WM    sodium chloride 0.9% flush 10 mL Q12H PRN    traZODone tablet 100 mg QHS     Facility-Administered Medications Ordered in Other Encounters   Medication Frequency    epoetin chloe injection 2,000 Units 1 time in Clinic/HOD    heparin  (porcine) injection 2,000 Units Once    heparin (porcine) injection 2,000 Units Once    heparin (porcine) injection 4,000 Units 1 time in Clinic/HOD    iron sucrose injection 100 mg 1 time in Clinic/HOD       Objective:     Vital Signs (Most Recent):  Temp: 98.3 °F (36.8 °C) (06/12/25 1129)  Pulse: 81 (06/12/25 1129)  Resp: 18 (06/12/25 1129)  BP: (!) 148/67 (06/12/25 1129)  SpO2: 97 % (06/12/25 1129) Vital Signs (24h Range):  Temp:  [98 °F (36.7 °C)-99 °F (37.2 °C)] 98.3 °F (36.8 °C)  Pulse:  [64-89] 81  Resp:  [17-20] 18  SpO2:  [95 %-99 %] 97 %  BP: (143-181)/(67-89) 148/67     Weight: 98.2 kg (216 lb 7.9 oz) (06/11/25 0400)  Body mass index is 32.92 kg/m².  Body surface area is 2.17 meters squared.    I/O last 3 completed shifts:  In: 360 [P.O.:60; Other:300]  Out: 3300 [Urine:700; Other:2600]     Physical Exam  Vitals and nursing note reviewed.   Constitutional:       Appearance: She is obese.   HENT:      Mouth/Throat:      Pharynx: Oropharynx is clear.   Cardiovascular:      Rate and Rhythm: Normal rate.      Pulses: Normal pulses.   Pulmonary:      Effort: Pulmonary effort is normal. No respiratory distress.      Breath sounds: No wheezing.   Abdominal:      Palpations: Abdomen is soft.   Musculoskeletal:      Right lower leg: No edema.      Left lower leg: No edema.   Skin:     General: Skin is dry.   Neurological:      Mental Status: She is alert and oriented to person, place, and time.   Psychiatric:         Mood and Affect: Mood normal.          Significant Labs:  CBC:   Recent Labs   Lab 06/12/25  0326   WBC 9.31   RBC 3.15*   HGB 9.5*   HCT 30.2*      MCV 96   MCH 30.2   MCHC 31.5*     CMP:   Recent Labs   Lab 06/12/25  0326   *   CALCIUM 7.7*   ALBUMIN 2.8*   PROT 6.3   *   K 4.1   CO2 18*   CL 99   BUN 33*   CREATININE 2.8*   ALKPHOS 109   ALT 12   AST 27   BILITOT 0.2     All labs within the past 24 hours have been reviewed.

## 2025-06-12 NOTE — PLAN OF CARE
TC to [facility] to determine if pt can receive Ertapenem post HD. Informed by head nurse that they do not carry that med. CM informed team and sent referral for home infusion to [facility].   Will continue to update plan as needed.  Nithin Botello RN,BSN

## 2025-06-12 NOTE — CONSULTS
[facility]  Infectious Disease  Consult Note    Patient Name: Cecile Bowen  MRN: [id number]  Admission Date: 6/9/2025  Hospital Length of Stay: 2 days  Attending Physician: Isauro Curran MD  Primary Care Provider: Lurdes Navarro MD     Isolation Status: No active isolations    Patient information was obtained from patient, past medical records, and ER records.      Inpatient consult to Infectious Diseases  Consult performed by: Steffanie Jarrett PA-C  Consult ordered by: Preet Isaacs PA-C        Assessment/Plan:     Renal/  * UTI (urinary tract infection)  74 y.o. female, wheelchair bound, with a PMHx of HTN, HLD, hypothyroidism, DM2, urinary retention s/p suprapubic catheter placement, and ESRD on HD (MWF). Admitted for concern for shortness of breath and UTI. Afebrile. No leukocytosis. CXR and CT with pulmonary edema and new scattered ground-glass opacities. Pt on NC. UA collected after murillo exchanged, now with urine culture growing E coli ESBL. S/p dose of gentamicin based on prior cultures. Urinary symptoms resolved.      Recommendations / Plan:  Start IV Ertapenem 500 mg q24h daily for duration of 10 days, to continue on discharge  after HD sessions (MW) (last dose on 6/20/25). She is doing an additional HD session on 6/14.  See antibiotic plan below.  Needs outpatient labs, please fax to HD center      -- Discussed with ID staff and primary team.  -- ID will sign off at this time.       Outpatient Antibiotic Therapy Plan:    Please send referral to [facility].    1) Infection: E coli ESBL UTI with hx of suprapubic catheter     2) Discharge Antibiotics:    Intravenous antibiotics:  Ertapenem 500 mg IV q 24 hours     3) Therapy Duration:  10 days    Estimated end date of IV antibiotics: 6/20/25    4) Outpatient Weekly Labs:    Order the following labs to be drawn on Mondays:   CBC  CMP     5) Fax Lab Results to Infectious Diseases  Provider: JEFFERY Bal    [facility] Fax Number: [phone]    6) Outpatient Infectious Diseases Follow-up    Follow-up with ID as needed for recurrent UTIs.      Thank you for your consult. I will sign off. Please contact us if you have any additional questions.    Steffanie Jarrett PA-C  Infectious Disease  [facility]    Subjective:     Principal Problem: UTI (urinary tract infection)    HPI: 74 y.o. female, wheelchair bound, with a PMHx of HTN, HLD, hypothyroidism, DM2, migraines, anemia, fibromyalgia, insomnia, depression, chronic pain, VIJAYA, urinary retention s/p suprapubic catheter placement, and ESRD on HD (MWF).    Presented for hematuria that started this morning, and about a week of shortness of breath, wheezing, and productive cough. No improvement in respiratory symptoms after HD session. Reports hematuria or pink tinged urine is her ONLY typical UTI symptoms. She gets frequent UTIs. She denies dysuria, flank pain, abdominal pain, fever/chills, N/VD, CP, congestion, or sore throat. Her SPC was last exchanged in April (~4/17/25).       In the ED, Afebrile. No leukocytosis. UA with >100 RBCs, >100 WBCs, and many bacteria (urine sample obtained from chronic suprapubic catheter that was last changed about 4 weeks ago). Urine culture from first UA is growing multiple GNR. Suprapubic catheter exchanged 6/9. New urine specimen collected after new murillo. Prior urine cultures from 4/16 grew E coli and E. Cloacae. Patient was given dose of gentamicin on 6/10. Most recent urine culture resulted in E coli ESBL.    CXR with pulmonary findings suggest edema, but no large focal consolidation. CT chest with new scattered ground-glass opacities and urothelial thickening concerning UTI.     ID consulted for antibiotic recs. Reports her symptoms have much improved since arrival. No longer on oxygen and her urine is clear.    Past Medical History:   Diagnosis Date    Bacteremia due to  Enterococcus 09/28/2021    Catheter-associated urinary tract infection 09/29/2021    Cervicogenic migraine     Chronic pain     CKD (chronic kidney disease) stage 4, GFR 15-29 ml/min     [facility]    CKD (chronic kidney disease) stage 4, GFR 15-29 ml/min     Diabetes mellitus     Long term use of Insulin, Diabetic Neuropathy    Dialysis patient 01/21/2022    Fibromyalgia     Hydronephrosis     Hyperlipidemia     Hypertension 12/12/2012    Hypothyroidism 12/12/2012    ARON (iron deficiency anemia)     Insomnia     Levoscoliosis     Malignant neoplasm of upper-outer quadrant of left breast in female, estrogen receptor positive     Metabolic acidosis     Mobility impaired     Nuclear sclerosis - Both Eyes 03/24/2014    VIJAYA (obstructive sleep apnea)     Osteopenia     Pulmonary nodule     Recurrent UTI     Renal manifestation of secondary diabetes mellitus     Secondary hyperparathyroidism, renal     Urinary retention     Urinary tract infection due to ESBL Klebsiella        Past Surgical History:   Procedure Laterality Date    ANGIOGRAPHY OF UPPER EXTREMITY N/A 9/19/2024    Procedure: Angiogram Extremity Bilateral;  Surgeon: RODRIGO Friedman III, MD;  Location: [facility] OR 2ND FLR;  Service: Vascular;  Laterality: N/A;  R femoral approach, arch & arm angiogram  168.76mgy  33.1145 gycm2  8.9min    AV FISTULA PLACEMENT Left 2/14/2024    Procedure: CREATION, AV FISTULA;  Surgeon: RODRIGO Friedman III, MD;  Location: [facility] OR 2ND FLR;  Service: Vascular;  Laterality: Left;  LUE AVF creation vs AVG insertion    BIOPSY OF URETER Left 2/18/2022    Procedure: BIOPSY, URETER;  Surgeon: Ronel Whittington MD;  Location: [facility] OR 1ST FLR;  Service: Urology;  Laterality: Left;    BREAST BIOPSY Right     benign    CHOLECYSTECTOMY      COLONOSCOPY N/A 1/13/2017    Procedure: COLONOSCOPY;  Surgeon: Morris Wiseman MD;  Location: [facility] ENDO (4TH FLR);  Service: Endoscopy;  Laterality: N/A;  Renal pt Nephrology advised to avoid  phosphate preps    COLONOSCOPY N/A 12/7/2023    Procedure: COLONOSCOPY;  Surgeon: Herve Allen MD;  Location: [facility] ENDO (2ND FLR);  Service: Endoscopy;  Laterality: N/A;  HD MWF; labs prior  suprapubic catheter  pt does not ambulate-uses christina lift- will have sling with her  9/7 ref. by Anastasiia Campbell, DO, PEG, instr. mailed, Eliquis hold approval pending-[facility] to hold Eliquis 2 days per Dr Navarro-GT  1/30-precall complete-MS    CYSTOSCOPY N/A 10/8/2018    Procedure: CYSTOSCOPY;  Surgeon: Ronel Whittington MD;  Location: [facility] OR 1ST FLR;  Service: Urology;  Laterality: N/A;  45 min    CYSTOSCOPY N/A 3/25/2019    Procedure: CYSTOSCOPY;  Surgeon: Ronel Whittington MD;  Location: [facility] OR 1ST FLR;  Service: Urology;  Laterality: N/A;  45 min    CYSTOSCOPY N/A 8/26/2019    Procedure: CYSTOSCOPY;  Surgeon: Ronel Whittington MD;  Location: [facility] OR 1ST FLR;  Service: Urology;  Laterality: N/A;  45 min    CYSTOSCOPY N/A 7/2/2021    Procedure: CYSTOSCOPY;  Surgeon: Ronel Whittington MD;  Location: [facility] OR 1ST FLR;  Service: Urology;  Laterality: N/A;    CYSTOSCOPY  12/23/2021    Procedure: CYSTOSCOPY;  Surgeon: Ronel Whittington MD;  Location: [facility] OR 1ST FLR;  Service: Urology;;    CYSTOSCOPY  2/18/2022    Procedure: CYSTOSCOPY;  Surgeon: Ronel Whittington MD;  Location: [facility] OR 1ST FLR;  Service: Urology;;    CYSTOSCOPY W/ URETERAL STENT PLACEMENT Left 5/15/2021    Procedure: CYSTOSCOPY, WITH URETERAL STENT INSERTION;  Surgeon: Levy Sánchez Jr., MD;  Location: [facility] OR 1ST FLR;  Service: Urology;  Laterality: Left;    CYSTOSCOPY WITH BIOPSY OF BLADDER N/A 1/27/2020    Procedure: CYSTOSCOPY, WITH BLADDER BIOPSY, WITH FULGURATION IF INDICATED;  Surgeon: Roenl Whittington MD;  Location: [facility] OR [address] [place] [address];  Service: Urology;  Laterality: N/A;    DILATION AND CURETTAGE OF UTERUS      FISTULOGRAM N/A 4/29/2024    Procedure: Fistulogram;  Surgeon: RODRIGO Friedman III, MD;   Location: [facility] CATH LAB;  Service: Peripheral Vascular;  Laterality: N/A;    FISTULOGRAM Left 1/6/2025    Procedure: Fistulogram;  Surgeon: RODRIGO Friedman III, MD;  Location: [facility] CATH LAB;  Service: Peripheral Vascular;  Laterality: Left;    GALLBLADDER SURGERY  2006    HYSTERECTOMY      INJECTION FOR SENTINEL NODE IDENTIFICATION Left 8/1/2019    Procedure: INJECTION, FOR SENTINEL NODE IDENTIFICATION;  Surgeon: Samia Fulton MD;  Location: [facility] OR [address] [place] [address];  Service: General;  Laterality: Left;    INJECTION OF BOTULINUM TOXIN TYPE A N/A 10/8/2018    Procedure: INJECTION, BOTULINUM TOXIN, TYPE A 300 UNITS;  Surgeon: Ronel Whittington MD;  Location: [facility] OR [address] [place] [address];  Service: Urology;  Laterality: N/A;    INJECTION OF BOTULINUM TOXIN TYPE A N/A 3/25/2019    Procedure: INJECTION, BOTULINUM TOXIN, TYPE A 300 UNITS;  Surgeon: Ronel Whittington MD;  Location: [address];  Service: Urology;  Laterality: N/A;    INJECTION OF BOTULINUM TOXIN TYPE A N/A 8/26/2019    Procedure: INJECTION, BOTULINUM TOXIN, TYPE A 300 UNITS;  Surgeon: Ronel Whittington MD;  Location: [facility] OR [address] [place] [address];  Service: Urology;  Laterality: N/A;    MASTECTOMY Left 8/1/2019    Procedure: MASTECTOMY 23 hour stay;  Surgeon: Samia Fulton MD;  Location: [facility] OR [address] [place] [address];  Service: General;  Laterality: Left;    MEDIPORT REMOVAL Right 6/24/2022    Procedure: REMOVAL, CATHETER, CENTRAL VENOUS, TUNNELED, WITH PORT;  Surgeon: Isauro Anderson MD;  Location: [facility] CATH LAB;  Service: Radiology;  Laterality: Right;    OVARIAN CYST SURGERY  1985    PERCUTANEOUS TRANSLUMINAL ANGIOPLASTY OF ARTERIOVENOUS FISTULA Left 4/29/2024    Procedure: PTA, AV FISTULA;  Surgeon: RODRIGO Friedman III, MD;  Location: [facility] CATH LAB;  Service: Peripheral Vascular;  Laterality: Left;    REPLACEMENT OF STENT Left 12/23/2021    Procedure: REPLACEMENT, STENT;  Surgeon: Ronel Whittington MD;  Location: [address];  Service: Urology;  Laterality: Left;  "   REPLACEMENT OF STENT Left 2/18/2022    Procedure: REPLACEMENT, STENT;  Surgeon: Ronel Whittington MD;  Location: [facility] OR 1ST FLR;  Service: Urology;  Laterality: Left;    RETROGRADE PYELOGRAPHY Left 2/18/2022    Procedure: PYELOGRAM, RETROGRADE;  Surgeon: Ronel Whittington MD;  Location: [facility] OR 1ST FLR;  Service: Urology;  Laterality: Left;    SENTINEL LYMPH NODE BIOPSY Left 8/1/2019    Procedure: BIOPSY, LYMPH NODE, SENTINEL;  Surgeon: Samia Fulton MD;  Location: [facility] OR 2ND FLR;  Service: General;  Laterality: Left;    spt placement      TONSILLECTOMY, ADENOIDECTOMY      TOTAL ABDOMINAL HYSTERECTOMY W/ BILATERAL SALPINGOOPHORECTOMY  1985    URETEROSCOPY Left 2/18/2022    Procedure: URETEROSCOPY;  Surgeon: Ronel Whittington MD;  [facility];  Service: Urology;  Laterality: Left;       Review of patient's allergies indicates:  No Known Allergies    Medications:  Medications Prior to Admission   Medication Sig    albuterol (PROVENTIL/VENTOLIN HFA) 90 mcg/actuation inhaler Inhale 1-2 puffs into the lungs every 6 (six) hours as needed for Wheezing or Shortness of Breath. Rescue    amLODIPine (NORVASC) 10 MG tablet TAKE 1 TABLET BY MOUTH EVERY DAY    anastrozole (ARIMIDEX) 1 mg Tab TAKE 1 TABLET BY MOUTH EVERY DAY    BD INSULIN SYRINGE ULTRA-FINE 0.5 mL 31 gauge x 5/16" Syrg USE WITH INSULIN 4 TIMES A DAY    carvediloL (COREG) 12.5 MG tablet Take 1 tablet (12.5 mg total) by mouth 2 (two) times daily.    diclofenac sodium (VOLTAREN) 1 % Gel Apply 2 g topically 2 (two) times daily as needed (arthritis).    DULoxetine (CYMBALTA) 20 MG capsule TAKE 2 CAPSULES(40 MG) BY MOUTH DAILY    ELIQUIS 5 mg Tab TAKE 1 TABLET BY MOUTH TWICE A DAY    erenumab-aooe (AIMOVIG AUTOINJECTOR) 140 mg/mL autoinjector 140 mg MONTHLY (route: subcutaneous)    ergocalciferol (ERGOCALCIFEROL) 50,000 unit Cap Take 1 capsule (50,000 Units total) by mouth every 7 days.    furosemide (LASIX) 80 MG tablet Take 1 tablet " "(80 mg total) by mouth once daily.    galcanezumab-gnlm 120 mg/mL Syrg Inject 120 mg into the skin every 28 days. maintenance dose    HYDROcodone-acetaminophen (NORCO)  mg per tablet Take 1 tablet by mouth every 6 (six) hours as needed for Pain.    ipratropium (ATROVENT) 21 mcg (0.03 %) nasal spray USE 2 SPRAYS IN EACH NOSTRIL TWICE DAILY    lactulose (CHRONULAC) 10 gram/15 mL solution Take 20 g by mouth 2 (two) times daily as needed (constipation).    LANTUS SOLOSTAR U-100 INSULIN 100 unit/mL (3 mL) InPn pen INJECT 12-15 UNITS UNDER THE SKIN at night    losartan (COZAAR) 100 MG tablet TAKE 1 TABLET(100 MG) BY MOUTH DAILY    magnesium oxide (MAG-OX) 400 mg (241.3 mg magnesium) tablet TAKE 1 TABLET(400 MG) BY MOUTH DAILY    methocarbamoL (ROBAXIN) 750 MG Tab TAKE 1 TO 2 TABLETS(750 TO 1500 MG) BY MOUTH THREE TIMES DAILY AS NEEDED FOR MUSCLE SPASMS    oxybutynin (DITROPAN-XL) 10 MG 24 hr tablet TAKE 1 TABLET BY MOUTH EVERY DAY    oxyCODONE (ROXICODONE) 10 mg Tab immediate release tablet Take 1 tablet (10 mg total) by mouth daily as needed (Severe pain).    pen needle, diabetic (BD ULTRA-FINE SHORT PEN NEEDLE) 31 gauge x 5/16" Ndle USE WITH LANTUS DAILY, e 11.65    sodium bicarbonate 650 MG tablet Take 1 tablet (650 mg total) by mouth once daily.    sucroferric oxyhydroxide (VELPHORO) 500 mg Chew Take by mouth 3 (three) times daily with meals.    sumatriptan (IMITREX) 100 MG tablet Take 1 tablet (100 mg total) by mouth every 2 (two) hours as needed for Migraine (Limit 2 in 14 hours and 9 in one month).    SYNTHROID 50 mcg tablet TAKE 1 TABLET BY MOUTH BEFORE BREAKFAST. ADMINISTER ON AN EMPTY STOMACH AT LEAST 30 MINUTES BEFORE FOOD. IF RECEIVING TUBE FEEDS, HOLD TUBE FEEDS FOR 1 HOUR BEFORE AND AFTER LEVOTHYROXINE ADMINISTRATION.    traZODone (DESYREL) 100 MG tablet TAKE 1 TABLET BY MOUTH NIGHTLY AS NEEDED FOR INSOMNIA.     Antibiotics (From admission, onward)      Start     Stop Route Frequency Ordered    " 06/12/25 1100  ertapenem (INVANZ) 500 mg in 0.9% NaCl 100 mL IVPB         -- IV Every 24 hours (non-standard times) 06/12/25 0955    06/12/25 1100  mupirocin 2 % ointment         06/17/25 0859 Nasl 2 times daily 06/12/25 0956          Antifungals (From admission, onward)      None          Antivirals (From admission, onward)      None             Immunization History   Administered Date(s) Administered    COVID-19 Vaccine 02/25/2021, 03/25/2021    COVID-19, MRNA, LN-S, PF (MODERNA FULL 0.5 ML DOSE) 02/25/2021, 03/25/2021    COVID-19, MRNA, LN-S, PF (Pfizer) (Purple Cap) 12/24/2021    Hepatitis B 11/03/2023, 12/08/2023    Hepatitis B, Adult 11/03/2023, 12/08/2023, 07/19/2024, 08/16/2024, 09/20/2024, 12/20/2024    Influenza (FLUAD) - Quadrivalent - Adjuvanted - PF *Preferred* (65+) 01/30/2023    Influenza - Quadrivalent 10/01/2014    Influenza - Quadrivalent - PF *Preferred* (6 months and older) 09/27/2023    Influenza - Trivalent - Fluarix, Flulaval, Fluzone, Afluria - PF 09/27/2024    Influenza - Trivalent - Fluzone High Dose - PF (65 years and older) 09/27/2017, 10/31/2018, 09/25/2019    PPD Test 07/01/2021, 12/21/2021, 01/03/2022, 01/25/2022    Pneumococcal Conjugate - 13 Valent 09/27/2017    Pneumococcal Polysaccharide - 23 Valent 09/19/2016, 01/30/2023    Zoster 12/16/2013       Family History       Problem Relation (Age of Onset)    ALS Mother    Cancer Maternal Grandmother, Paternal Grandfather, Brother    Diabetes Sister, Maternal Aunt, Maternal Uncle    Kidney disease Sister, Maternal Aunt    Prostate cancer Brother    Scoliosis Brother          Social History     Socioeconomic History    Marital status:     Number of children: 0    Highest education level: Master's degree (e.g., MA, MS, Lelo, MEd, MSW, BANDAR)   Occupational History    Occupation: teacher     Comment: retired   Tobacco Use    Smoking status: Never     Passive exposure: Past    Smokeless tobacco: Never   Substance and Sexual Activity     Alcohol use: No     Alcohol/week: 0.0 standard drinks of alcohol    Drug use: No    Sexual activity: Not Currently     Birth control/protection: Abstinence   Social History Narrative    Single ()             Brother passed away last year.  Pt lives her her sister. (5/27)     Social Drivers of Health     Financial Resource Strain: Low Risk  (6/10/2025)    Overall Financial Resource Strain (CARDIA)     Difficulty of Paying Living Expenses: Not hard at all   Recent Concern: Financial Resource Strain - Medium Risk (4/17/2025)    Overall Financial Resource Strain (CARDIA)     Difficulty of Paying Living Expenses: Somewhat hard   Food Insecurity: No Food Insecurity (6/10/2025)    Hunger Vital Sign     Worried About Running Out of Food in the Last Year: Never true     Ran Out of Food in the Last Year: Never true   Transportation Needs: No Transportation Needs (6/10/2025)    PRAPARE - Transportation     Lack of Transportation (Medical): No     Lack of Transportation (Non-Medical): No   Physical Activity: Inactive (4/17/2025)    Exercise Vital Sign     Days of Exercise per Week: 0 days     Minutes of Exercise per Session: 0 min   Stress: No Stress Concern Present (6/10/2025)    [employer] - Occupational Stress Questionnaire     Feeling of Stress : Not at all   Housing Stability: Low Risk  (6/10/2025)    Housing Stability Vital Sign     Unable to Pay for Housing in the Last Year: No     Number of Times Moved in the Last Year: 0     Homeless in the Last Year: No     Review of Systems   Constitutional:  Negative for chills, diaphoresis and fever.   HENT:  Negative for sore throat.    Respiratory:  Negative for cough, shortness of breath and stridor.    Gastrointestinal:  Negative for abdominal pain, diarrhea, nausea and vomiting.   Genitourinary:  Positive for hematuria. Negative for frequency.   Musculoskeletal:  Negative for arthralgias.   Skin:  Negative for color change and wound.    Neurological:  Negative for weakness.   Psychiatric/Behavioral:  Negative for confusion.    All other systems reviewed and are negative.    Objective:     Vital Signs (Most Recent):  Temp: 98 °F (36.7 °C) (06/12/25 0749)  Pulse: 66 (06/12/25 0822)  Resp: 18 (06/12/25 0822)  BP: (!) 143/74 (06/12/25 0749)  SpO2: 99 % (06/12/25 0822) Vital Signs (24h Range):  Temp:  [98 °F (36.7 °C)-99 °F (37.2 °C)] 98 °F (36.7 °C)  Pulse:  [64-89] 66  Resp:  [17-20] 18  SpO2:  [95 %-99 %] 99 %  BP: (143-181)/() 143/74     Weight: 98.2 kg (216 lb 7.9 oz)  Body mass index is 32.92 kg/m².    Estimated Creatinine Clearance: 22.2 mL/min (A) (based on SCr of 2.8 mg/dL (H)).     Physical Exam  Vitals and nursing note reviewed.   Constitutional:       General: She is not in acute distress.     Appearance: Normal appearance.   HENT:      Head: Normocephalic and atraumatic.      Nose: Nose normal.      Mouth/Throat:      Mouth: Mucous membranes are moist.   Eyes:      Extraocular Movements: Extraocular movements intact.      Conjunctiva/sclera: Conjunctivae normal.   Cardiovascular:      Rate and Rhythm: Normal rate and regular rhythm.      Heart sounds: Normal heart sounds. No murmur heard.  Pulmonary:      Effort: Pulmonary effort is normal. No respiratory distress.      Breath sounds: Normal breath sounds.      Comments: On 2L NC  Abdominal:      General: Abdomen is flat. There is no distension.      Tenderness: There is no abdominal tenderness.      Comments: SPC in place, no surrounding redness or drainage, urine yellow  Some moisture dermatitis in abdominal fold   Musculoskeletal:         General: Normal range of motion.      Cervical back: Normal range of motion.   Skin:     General: Skin is warm and dry.      Capillary Refill: Capillary refill takes less than 2 seconds.   Neurological:      Mental Status: She is alert and oriented to person, place, and time.   Psychiatric:         Mood and Affect: Mood normal.         Behavior:  Behavior normal.          Significant Labs: Blood Culture:   Recent Labs   Lab 12/31/24  0937 01/02/25  0526 01/02/25  0529   LABBLOO Gram stain aer bottle: Gram positive cocci in chains resembling Strep  Gram stain zelda bottle: Gram positive cocci in chains resembling Strep  Results called to and read back by: Sean Hernandez RN  01/01/2025  10:31  ENTEROCOCCUS FAECALIS  For susceptibility see order #[id number]  *  Gram stain aer bottle: Gram positive cocci in chains resembling Strep  Gram stain zelda bottle: Gram positive cocci in chains resembling Strep  Results called to and read back by: Sean Hernandez RN  01/01/2025  10:31  ENTEROCOCCUS FAECALIS* No growth after 5 days. No growth after 5 days.     CBC:   Recent Labs   Lab 06/11/25  0322 06/12/25  0326   WBC 6.42 9.31   HGB 9.0* 9.5*   HCT 27.7* 30.2*    263     CMP:   Recent Labs   Lab 06/11/25  0322 06/12/25  0326   * 128*   K 3.8 4.1   CL 98 99   CO2 17* 18*   * 199*   BUN 35* 33*   CREATININE 3.1* 2.8*   CALCIUM 7.7* 7.7*   PROT 5.9* 6.3   ALBUMIN 2.5* 2.8*   BILITOT 0.2 0.2   ALKPHOS 114 109   AST 12 27   ALT 11 12   ANIONGAP 12 11     Microbiology Results (last 7 days)       Procedure Component Value Units Date/Time    Urine culture [[id number]]  (Abnormal)  (Susceptibility) Collected: 06/10/25 0238    Order Status: Completed Specimen: Urine, Supra Pubic Updated: 06/12/25 0815     Urine Culture 50,000 - 99,999 cfu/ml Escherichia coli ESBL     Comment: No other significant isolate.       Urine culture [[id number]]  (Abnormal) Collected: 06/09/25 1908    Order Status: Completed Specimen: Urine Updated: 06/10/25 2202     Urine Culture 50,000 - 99,999 cfu/ml Gram-negative Rods     Comment: Identification and susceptibility pending         50,000 - 99,999 cfu/ml Gram-negative Rods     Comment: Identification and susceptibility pending       Culture, Respiratory with Gram Stain [[id number]]     Order Status: Sent Specimen: Respiratory      Influenza A & B by Molecular [[id number]]  (Normal) Collected: 06/09/25 1807    Order Status: Completed Specimen: Nasopharyngeal Swab Updated: 06/09/25 1849     INFLUENZA A MOLECULAR Negative     INFLUENZA B MOLECULAR  Negative          Urine Culture:   Recent Labs   Lab 12/31/24  1049 04/16/25  2106 05/24/25  1130 06/09/25  1908 06/10/25  0238   LABURIN KLEBSIELLA PNEUMONIAE ESBL  50,000 - 99,999 cfu/ml  *  PROTEUS MIRABILIS  10,000 - 49,999 cfu/ml  * >100,000 cfu/ml Escherichia coli*  >100,000 cfu/ml Enterobacter cloacae* Multiple organisms isolated. None in predominance. Repeat if clinically necessary. 50,000 - 99,999 cfu/ml Gram-negative Rods*  50,000 - 99,999 cfu/ml Gram-negative Rods* 50,000 - 99,999 cfu/ml Escherichia coli ESBL*     Urine Studies:   Recent Labs   Lab 12/31/24  1049 04/16/25  2106 06/10/25  0238   COLORU Yellow   < > Brown*   APPEARANCEUA Cloudy*   < > Cloudy*   PHUR 6.0   < > 6.0   SPECGRAV 1.025   < > 1.020   PROTEINUA 3+*   < > 3+*   GLUCUA Trace*   < > 2+*   KETONESU 1+*  --   --    BILIRUBINUA Negative   < > Negative   OCCULTUA 2+*   < > 3+*   NITRITE Negative   < > Negative   UROBILINOGEN  --    < > Negative   LEUKOCYTESUR 3+*   < > 3+*   RBCUA 14*   < > >100*   WBCUA >100*   < > >100*   BACTERIA Many*   < > Rare   SQUAMEPITHEL 11  --   --    HYALINECASTS 0   < > 0    < > = values in this interval not displayed.       Significant Imaging: I have reviewed all pertinent imaging results/findings within the past 24 hours.

## 2025-06-12 NOTE — TELEPHONE ENCOUNTER
Review until follow up: JEFFERY Bal     Reviewer notes: does not need end of care appt, will follow labs from afar

## 2025-06-12 NOTE — ASSESSMENT & PLAN NOTE
Patient's blood pressure range in the last 24 hours was: BP  Min: 143/74  Max: 181/89.The patient's inpatient anti-hypertensive regimen is listed below:  Current Antihypertensives  amLODIPine tablet 10 mg, Daily, Oral  carvediloL tablet 12.5 mg, 2 times daily, Oral  furosemide tablet 80 mg, Daily, Oral  losartan tablet 100 mg, Daily, Oral    Plan  - BP is controlled, no changes needed to their regimen

## 2025-06-12 NOTE — ASSESSMENT & PLAN NOTE
Patient's FSGs are controlled on current medication regimen.  Last A1c reviewed-   Lab Results   Component Value Date    HGBA1C 5.4 04/16/2025     Most recent fingerstick glucose reviewed-   Recent Labs   Lab 06/11/25  1638 06/11/25  2055 06/12/25  0748 06/12/25  1126   POCTGLUCOSE 291* 336* 143* 235*     Current correctional scale  Low  Maintain anti-hyperglycemic dose as follows-   Antihyperglycemics (From admission, onward)      Start     Stop Route Frequency Ordered    06/11/25 2100  insulin glargine U-100 (Lantus) pen 8 Units         -- SubQ Nightly 06/11/25 0926    06/09/25 2136  insulin aspart U-100 pen 0-5 Units         -- SubQ Before meals & nightly PRN 06/09/25 2037          Hold Oral hypoglycemics while patient is in the hospital.  -Accuchleigha AC/HS

## 2025-06-12 NOTE — ASSESSMENT & PLAN NOTE
Patient has paroxysmal (<7 days) atrial fibrillation. Patient is currently in sinus rhythm. [id number] Score: 3. The patients heart rate in the last 24 hours is as follows:  Pulse  Min: 64  Max: 89     Antiarrhythmics   coreg 12.5mg, bid, oral    Anticoagulants  apixaban tablet 5 mg, 2 times daily, Oral    Plan  - Replete lytes with a goal of K>4, Mg >2  - Patient is anticoagulated, see medications listed above.  - Patient's afib is currently controlled

## 2025-06-12 NOTE — PROGRESS NOTES
"[facility] [address] [place] [address] Medicine  Progress Note    Patient Name: Cecile Bowen  MRN: [id number]  Patient Class: IP- Inpatient   Admission Date: 6/9/2025  Length of Stay: 2 days  Attending Physician: Isauro Curran MD  Primary Care Provider: Lurdes Navarro MD        Subjective     Principal Problem:UTI (urinary tract infection)        HPI:  Cecile Boewn is a 74 y.o. female with a PMHx of HTN, HLD, hypothyroidism, DM2, migraines, anemia, fibromyalgia, insomnia, depression, chronic pain, VIJAYA, urinary retention s/p suprapubic catheter placement, and ESRD on HD (MWF) who presents for evaluation of hematuria, shortness of breath, and cough. She reports shortness of breath, intermittent wheezing, and cough productive of "greyish" sputum x6-7 days. Notes she has had this issue several times in the past and thought she may have some extra fluid. She went to her dialysis appointment today and had a 4 hour session completed. She reports she was still having the symptoms after HD and is under her usual dry weight. She also notes hematuria beginning this morning. Reports she typically has this when she is getting a UTI. She denies abdominal pain, fever/chills, N/VD, CP, congestion, or sore throat. She does endorse BLE edema, right leg greater than left, that is chronic and better than baseline. Pt reports she is wheelchair bound at baseline and uses a christina lift to transfer.    In the ED, VSS, afebrile. CBC with stable anemia. Na 133. Bicarb 22. Glucose 120. Cr 1.8 (hx of ESRD). Calcium 8.4. Albumin 3.0. . . HS troponin 14. EKG shows SR w/ PACs, 78 bpm, no ST elevation. Flu/COVID negative. UA with 3+ leukocytes, >100 RBCs, >100 WBCs, and many bacteria; urine sample obtained from chronic suprapubic catheter that was last changed about 4 weeks ago. Will have nursing exchange catheter and obtain new UA. CXR with pulmonary findings suggest edema, bronchial airway inflammation or infection " not excluded. No large focal consolidation. The patient received a duo neb treatment, prednisone 40mg, and keflex.    Overview/Hospital Course:  Patient admitted to hospital medicine for SOB and UTI. Patient SOB 2/2 volume overload as she recently ran out of her lasix. Nephrology consulted for HD. Continued lasix, attempt to ween O2 back to RA as tolerated. Patient noted to have UTI, HX of ESBL and MDRO. Started gentamicin for now based on previous cultures.  Final urine cultures show ecoli. ID consulted, ertapenem started. Plan for Iv abx after HD outpatient. Weaned off oxygen. Tolerating RA and satting at 97%.       Interval History: NAEON. HDS. Patient reports feeling well. Notes breathing has been improving. Weaned of oxygen. Urine cultures show ecoli, started on ertapenem. ID following, appreciate recs. Plan to continue IV antibiotics with HD session outpatient.     Review of Systems   Constitutional:  Negative for chills and fever.   Respiratory:  Positive for cough. Negative for chest tightness and shortness of breath (improving).    Cardiovascular:  Negative for chest pain and leg swelling.   Gastrointestinal:  Negative for abdominal pain and nausea.   Genitourinary:  Positive for hematuria.   Neurological:  Negative for dizziness and weakness.     Objective:     Vital Signs (Most Recent):  Temp: 98.3 °F (36.8 °C) (06/12/25 1129)  Pulse: 81 (06/12/25 1129)  Resp: 18 (06/12/25 1129)  BP: (!) 148/67 (06/12/25 1129)  SpO2: 97 % (06/12/25 1129) Vital Signs (24h Range):  Temp:  [98 °F (36.7 °C)-99 °F (37.2 °C)] 98.3 °F (36.8 °C)  Pulse:  [64-89] 81  Resp:  [18-20] 18  SpO2:  [95 %-99 %] 97 %  BP: (143-181)/(67-89) 148/67     Weight: 98.2 kg (216 lb 7.9 oz)  Body mass index is 32.92 kg/m².    Intake/Output Summary (Last 24 hours) at 6/12/2025 3005  Last data filed at 6/12/2025 0534  Gross per 24 hour   Intake --   Output 250 ml   Net -250 ml         Physical Exam  Vitals and nursing note reviewed.    Constitutional:       Appearance: She is well-developed.   Eyes:      Pupils: Pupils are equal, round, and reactive to light.   Cardiovascular:      Rate and Rhythm: Normal rate and regular rhythm.   Pulmonary:      Effort: Pulmonary effort is normal.      Breath sounds: Normal breath sounds.   Abdominal:      Palpations: Abdomen is soft.      Tenderness: There is no abdominal tenderness.   Musculoskeletal:         General: No tenderness.   Skin:     General: Skin is warm and dry.   Neurological:      Mental Status: She is alert and oriented to person, place, and time.   Psychiatric:         Behavior: Behavior normal.               Significant Labs: All pertinent labs within the past 24 hours have been reviewed.  CBC:   Recent Labs   Lab 06/11/25  0322 06/12/25  0326   WBC 6.42 9.31   HGB 9.0* 9.5*   HCT 27.7* 30.2*    263     CMP:   Recent Labs   Lab 06/11/25 0322 06/12/25  0326   * 128*   K 3.8 4.1   CL 98 99   CO2 17* 18*   * 199*   BUN 35* 33*   CREATININE 3.1* 2.8*   CALCIUM 7.7* 7.7*   PROT 5.9* 6.3   ALBUMIN 2.5* 2.8*   BILITOT 0.2 0.2   ALKPHOS 114 109   AST 12 27   ALT 11 12   ANIONGAP 12 11       Significant Imaging: I have reviewed all pertinent imaging results/findings within the past 24 hours.  CT Chest Without Contrast  Narrative: EXAMINATION:  CT CHEST WITHOUT CONTRAST    CLINICAL HISTORY:  Cough, persistent    TECHNIQUE:  Low dose axial images, sagittal and coronal reformations were obtained from the thoracic inlet to the lung bases. Contrast was not administered.    COMPARISON:  Chest x-ray 06/09/2025, CT renal stone study 06/30/2021, CT chest 05/22/2021    FINDINGS:  SOFT TISSUES: No axillary or subpectoral lymphadenopathy. Several thyroid calcifications, unchanged.  Dependent body wall edema overlying the flanks.    HEART AND MEDIASTINUM: No mediastinal or hilar lymphadenopathy. Heart is normal size.  No pericardial effusion.  Multi-vessel coronary artery calcific  atherosclerosis.  Dense calcification of the mitral annulus.  Aortic valvular calcification.  Pulmonary trunk is enlarged measuring 4.4 cm.  Left-sided aortic arch.    LUNGS, PLEURA, AND AIRWAYS: Airways are patent. Mosaic attenuation throughout the lungs, similar to prior.  Stable 0.6 cm solid nodule in the left lower lobe.  Stable pulmonary micronodule in the right upper lobe (series 302 image 156) and several additional stable smaller micro nodules.  Patchy ground-glass opacities throughout bilateral lungs not definitely seen on prior, for example in the right upper lobe (series 302, image 91) and left upper lobe (series 302, image 192).No pleural effusion or pneumothorax.    UPPER ABDOMEN: Cholecystectomy.  Bilateral atrophic kidneys.  Air in the left renal collecting system with mildly asymmetric left perinephric fat stranding.  Left urothelial thickening.    BONES: Degenerative changes.  No acute fracture or aggressive osseous lesion.  Impression: 1. New scattered ground-glass opacities, nonspecific but may reflect a nonspecific infectious or non-infectious inflammatory process.  Pulmonary edema may present similarly.  Correlation advised.  2. Air in the left renal collecting system with urothelial thickening concerning for a gas-forming urinary tract infection.  Recommend correlation with urinalysis and any history of  tract instrumentation.  3. Several unchanged bilateral pulmonary nodules.  4. Similar mosaic attenuation throughout the lungs possibly representing small airways disease.  5. Enlargement of the pulmonary trunk which may be seen the setting of pulmonary hypertension.  6. Additional findings as above.  This report was flagged in Epic as abnormal.    Electronically signed by resident: Ki Zavala  Date:    06/10/2025  Time:    02:18    Electronically signed by: Ion Roth  Date:    06/10/2025  Time:    03:24          Assessment & Plan  Shortness of breath  Pt reports shortness of breath  and productive cough x6-7 days. Intermittent wheezing. Denies rhinorrhea, sore throat, fever, or chills. Possible bronchitis or undiagnosed COPD or hypervolemia; pt reports she has never been a smoker but notes her father and brother that she previously lived with did smoke.  -Afebrile, no leukocytosis.  -  -CXR with pulmonary findings suggest edema, bronchial airway inflammation or infection not excluded. No large focal consolidation.   -CT chest with scatter groudn glass opacities, enlargement of pulmonary trunk  -Sputum cx pending.  -Flu/COVID negative.  -Duo nebs q6h while awake and PRN.   -Will start mucinex DM and prednisone 40mg qd  -PFTs ordered outpatient but have not been completed.  - likely 2/2 volume overload   - Sob improving  - Now satting 97% on RA  UTI (urinary tract infection)  Hematuria  Chronic suprapubic catheter  -Pt reports hematuria starting today. Denies abdominal pain or fever/chills.  -Afebrile, no leukocytosis; 0/4 SIRS  -Initial UA obtained from chronic suprapubic murillo, concern for contaminated sample given catheter has been in place for almost 4 weeks.  -Nursing exchanged suprapubic catheter 6/9.  -Repeat UA infectious  -start gentamicin based on previous cultures  -Suprapubic catheter care per nursing.  -urine cultures show ecoli, started on ertapenem.   - ID consulted, appreciate recs  - Plan to continue IV abx outpatient after HD session.   Hypothyroidism  Patient has chronic hypothyroidism. TFTs reviewed-   Lab Results   Component Value Date    TSH 1.631 12/31/2024   . Will continue chronic levothyroxine and adjust for and clinical changes.  Hyperlipidemia   Patient is not chronically on statin.Monitor clinically. Last LDL was   Lab Results   Component Value Date    LDLCALC 85 04/22/2024     VIJAYA (obstructive sleep apnea)  -Reports history of PTSD so unable to tolerate CPAP.  Major depressive disorder, recurrent, moderate  Patient has recurrent depression which is moderate  and is currently controlled. Will Continue anti-depressant medications. We will not consult psychiatry at this time. Patient does not display psychosis at this time. Continue to monitor closely and adjust plan of care as needed.  -Continue home cymbalta and trazodone.  Insomnia  -Chronic, stable.  -Continue trazodone.  Class 1 obesity due to excess calories with serious comorbidity in adult  Body mass index is 32.92 kg/m². Obesity complicates all aspects of disease management from diagnostic modalities to treatment. Weight loss encouraged and health benefits explained to patient.   Chronic daily headache  -Chronic issue.   -Continue PRN tylenol and sumatriptan.  Chronic pain  Fibromyalgia  -Chronic, controlled.  - reviewed, continue home norco, oxy and robaxin prn  Malignant neoplasm of left breast, estrogen receptor positive  Prophylactic use of anastrozole (Arimidex)  Grade I invasive ductal carcinoma ER+, MI+ s/p radical mastectomy with sentinel lymph node bx   -Follows with oncology  -Continue anastrazole  Anemia in ESRD (end-stage renal disease)  Anemia is likely due to chronic disease due to ESRD. Most recent hemoglobin and hematocrit are listed below.  Recent Labs     06/10/25  0731 06/11/25  0322 06/12/25  0326   HGB 11.1* 9.0* 9.5*   HCT 36.9* 27.7* 30.2*     Plan  - Monitor serial CBC: Daily  - Transfuse PRBC if patient becomes hemodynamically unstable, symptomatic or H/H drops below 7/21.  - Patient has not received any PRBC transfusions to date  - Patient's anemia is currently stable  ESRD (end stage renal disease) on dialysis  MWF schedule, last dialyzed 6/9  Residual renal function?- Yes    - Nephrology consulted  - Continue chronic hemodialysis  - Monitor daily electrolytes and defer dialysis orders to nephrology  - Renally dose medications  - Renal diet  - Continue phosphorus binders  - Daily weights/strict I&Os  - 1.5L FR  Primary hypertension  Patient's blood pressure range in the last 24 hours  was: BP  Min: 143/74  Max: 181/89.The patient's inpatient anti-hypertensive regimen is listed below:  Current Antihypertensives  amLODIPine tablet 10 mg, Daily, Oral  carvediloL tablet 12.5 mg, 2 times daily, Oral  furosemide tablet 80 mg, Daily, Oral  losartan tablet 100 mg, Daily, Oral    Plan  - BP is controlled, no changes needed to their regimen  Type 2 diabetes mellitus with kidney complication, with long-term current use of insulin  Patient's FSGs are controlled on current medication regimen.  Last A1c reviewed-   Lab Results   Component Value Date    HGBA1C 5.4 04/16/2025     Most recent fingerstick glucose reviewed-   Recent Labs   Lab 06/11/25  1638 06/11/25  2055 06/12/25  0748 06/12/25  1126   POCTGLUCOSE 291* 336* 143* 235*     Current correctional scale  Low  Maintain anti-hyperglycemic dose as follows-   Antihyperglycemics (From admission, onward)      Start     Stop Route Frequency Ordered    06/11/25 2100  insulin glargine U-100 (Lantus) pen 8 Units         -- SubQ Nightly 06/11/25 0926    06/09/25 2136  insulin aspart U-100 pen 0-5 Units         -- SubQ Before meals & nightly PRN 06/09/25 2037          Hold Oral hypoglycemics while patient is in the hospital.  -Accuchecks AC/HS  Unspecified atrial fibrillation  Patient has paroxysmal (<7 days) atrial fibrillation. Patient is currently in sinus rhythm. [id number] Score: 3. The patients heart rate in the last 24 hours is as follows:  Pulse  Min: 64  Max: 89     Antiarrhythmics   coreg 12.5mg, bid, oral    Anticoagulants  apixaban tablet 5 mg, 2 times daily, Oral    Plan  - Replete lytes with a goal of K>4, Mg >2  - Patient is anticoagulated, see medications listed above.  - Patient's afib is currently controlled  Hyponatremia  Hyponatremia is likely due to hyervolemia. The patient's most recent sodium results are listed below.  Recent Labs     06/10/25  0731 06/11/25  0322 06/12/25  0326   * 127* 128*     Plan  - currently asymptomatic  - HD  later today  - monitor daily  VTE Risk Mitigation (From admission, onward)           Ordered     apixaban tablet 5 mg  2 times daily         06/09/25 2037     IP VTE HIGH RISK PATIENT  Once         06/09/25 2037     Place sequential compression device  Until discontinued         06/09/25 2037                    Discharge Planning   FLORENTINO: 6/12/2025     Code Status: Full Code   Medical Readiness for Discharge Date:   Discharge Plan A: Home Health                        Preet Isaacs PA-C  Department of Hospital Medicine   [facility]

## 2025-06-12 NOTE — ASSESSMENT & PLAN NOTE
Nephrology History  iHD Schedule: MF  Unit/MD: [facility]/Dr. Hare   Duration: 4 hours   EDW: 98 kg.   Access: JENNY AVF   Residual Renal Function: present   Last HD prior to presentation: 6/9/25        Plan/Recommendations:      - No urgent indication for RRT. Next HD tomorrow 6/12.  - Hgb goal 10-11, at goal  - Hyponatremia in setting of ESRD, 1L fluid restrictions. UF with HD tomorrow   - continue sevelamer

## 2025-06-12 NOTE — PLAN OF CARE
[facility]  Discharge Final Note    Primary Care Provider: Lurdes Navarro MD    Expected Discharge Date: 6/12/2025    Final Discharge Note (most recent)       Final Note - 06/12/25 1508          Final Note    Assessment Type Final Discharge Note     Anticipated Discharge Disposition Home-Health Care [facility]     Hospital Resources/Appts/Education Provided Provided patient/caregiver with written discharge plan information        Post-Acute Status    Post-Acute Authorization Home Health;IV Infusion     Home Health Status Set-up Complete/Auth obtained     IV Infusion Status Set-up Complete/Auth obtained     Discharge Delays None known at this time                   Future Appointments   Date Time Provider Department Center   6/13/2025 10:45 AM CHAIR 13, [facility] K[facility]NEY [facility]   6/16/2025 10:45 AM CHAIR 13, [facility]   6/18/2025 10:45 AM CHAIR 13, [facility]   6/20/2025 10:45 AM CHAIR 13, [facility]   6/23/2025 10:45 AM CHAIR 13, [facility]   6/25/2025 10:45 AM CHAIR 13, [facility]   6/27/2025 10:45 AM CHAIR 13, [facility]   6/30/2025 10:45 AM CHAIR 13, [facility]   7/2/2025 10:45 AM CHAIR 13, [facility]   7/3/2025 10:00 AM Tesha Stafford MD [facility] PHYSMED [facility] [address]   7/4/2025 10:45 AM CHAIR 13, [facility]   7/7/2025 10:45 AM CHAIR 13, [facility]   7/8/2025 To Be Determined Emmanuelle Paul, NP [facility] C3[facility] [place]   7/9/2025 10:45 AM CHAIR 13, [facility]   7/11/2025 10:45 AM CHAIR 13, [facility]   7/14/2025 10:45 AM CHAIR 13, [facility] KIDNEY CARE [facility]   7/15/2025 12:00 PM PULMONARY FUNCTION [facility] PULMLAB  [address]   7/15/2025 12:15 PM PULMONARY FUNCTION [facility] PULMLAB [address]   7/15/2025 12:30 PM PULMONARY FUNCTION [facility] PULMLAB [address]   7/15/2025  1:00 PM Elias Pearson MD [facility] PULMSVC [facility] [address]   7/16/2025 10:45 AM CHAIR 13, [facility]   7/18/2025 10:45 AM CHAIR 13, [facility] KIDNEY CARE [facility]   7/21/2025 10:45 AM CHAIR 13, [facility] KIDNEY CARE [facility]   7/23/2025 10:45 AM CHAIR 13, [facility] KIDNEY CARE [facility]   7/25/2025 10:45 AM CHAIR 13, [facility] KIDNEY CARE [facility]   7/28/2025 10:45 AM CHAIR 13, [facility] KIDNEY CARE [facility]   7/30/2025 10:45 AM CHAIR 13, [facility] KIDNEY CARE [facility]   8/1/2025 10:45 AM CHAIR 13, [facility] KIDNEY CARE [facility]   8/4/2025 10:45 AM CHAIR 13, [facility] KIDNEY CARE [facility]   8/6/2025 10:45 AM CHAIR 13, [facility]   8/8/2025 10:45 AM CHAIR 13, [facility]   8/11/2025 10:45 AM CHAIR 13, [facility]   8/13/2025 10:45 AM CHAIR 13, [facility] KIDNEY CARE [facility]   8/15/2025 10:45 AM CHAIR 13, [facility]   8/18/2025 10:45 AM CHAIR 13, [facility] KIDNEY CARE [facility]   8/20/2025 10:45 AM CHAIR 13, [facility]   8/22/2025 10:45 AM CHAIR 13, [facility]   8/25/2025 10:45 AM CHAIR 13, [facility] KIDNEY CARE [facility]   8/26/2025  9:45 AM VASCULAR, LAB [facility]   8/26/2025 10:30 AM RODRIGO Friedman III, MD [facility] S[facility]INOCSZAHRAA [facility]   8/27/2025 10:45 AM CHAIR 13, [facility]   8/29/2025 10:45 AM CHAIR 13, [facility]     Pt discharged home with home infusion provided by [facility] and resumption of Home health with [facility].   Nithin Botello, RN,BSN        Important Message from  Medicare  Important Message from Medicare regarding Discharge Appeal Rights: Explained to patient/caregiver, Signed/date by patient/caregiver     Date IMM was signed: 06/12/25  Time IMM was signed: 1303     Follow-up providers       Francois Provider   Specialty: Internal Medicine    [address]  [address]  [facility] [address]       Next Steps: Follow up    Lurdes Navarro MD   Specialty: Internal Medicine   Relationship: PCP - General    [address]  [place] [address]   Phone: [phone]       Next Steps: Follow up              After-discharge care                Dialysis/Infusion       [facility] [place]   Service: Infusion and IV Therapy    [address] [address]   Phone: [phone]                 Home Medical Care       *[facility]   Service: Home Health Services    [address]  [place] [address]   Phone: [phone]       Instructions: A representative will be calling you for your first visit. If you do not hear from them within 24 hours of discharge, please call them with the number provided.

## 2025-06-12 NOTE — SUBJECTIVE & OBJECTIVE
Past Medical History:   Diagnosis Date    Bacteremia due to Enterococcus 09/28/2021    Catheter-associated urinary tract infection 09/29/2021    Cervicogenic migraine     Chronic pain     CKD (chronic kidney disease) stage 4, GFR 15-29 ml/min     [facility]    CKD (chronic kidney disease) stage 4, GFR 15-29 ml/min     Diabetes mellitus     Long term use of Insulin, Diabetic Neuropathy    Dialysis patient 01/21/2022    Fibromyalgia     Hydronephrosis     Hyperlipidemia     Hypertension 12/12/2012    Hypothyroidism 12/12/2012    ARON (iron deficiency anemia)     Insomnia     Levoscoliosis     Malignant neoplasm of upper-outer quadrant of left breast in female, estrogen receptor positive     Metabolic acidosis     Mobility impaired     Nuclear sclerosis - Both Eyes 03/24/2014    VIJAYA (obstructive sleep apnea)     Osteopenia     Pulmonary nodule     Recurrent UTI     Renal manifestation of secondary diabetes mellitus     Secondary hyperparathyroidism, renal     Urinary retention     Urinary tract infection due to ESBL Klebsiella        Past Surgical History:   Procedure Laterality Date    ANGIOGRAPHY OF UPPER EXTREMITY N/A 9/19/2024    Procedure: Angiogram Extremity Bilateral;  Surgeon: RODRIGO Friedman III, MD;  Location: [facility] OR 2ND FLR;  Service: Vascular;  Laterality: N/A;  R femoral approach, arch & arm angiogram  168.76mgy  33.1145 gycm2  8.9min    AV FISTULA PLACEMENT Left 2/14/2024    Procedure: CREATION, AV FISTULA;  Surgeon: RODRIGO Friedman III, MD;  Location: [facility] OR 2ND FLR;  Service: Vascular;  Laterality: Left;  LUE AVF creation vs AVG insertion    BIOPSY OF URETER Left 2/18/2022    Procedure: BIOPSY, URETER;  Surgeon: Ronel Whittington MD;  Location: [facility] OR 1ST FLR;  Service: Urology;  Laterality: Left;    BREAST BIOPSY Right     benign    CHOLECYSTECTOMY      COLONOSCOPY N/A 1/13/2017    Procedure: COLONOSCOPY;  Surgeon: Morris Wiseman MD;  Location: [facility] ENDO (4TH FLR);  Service: Endoscopy;   Laterality: N/A;  Renal pt Nephrology advised to avoid phosphate preps    COLONOSCOPY N/A 12/7/2023    Procedure: COLONOSCOPY;  Surgeon: Herve Allen MD;  Location: [facility] ENDO (2ND FLR);  Service: Endoscopy;  Laterality: N/A;  HD MWF; labs prior  suprapubic catheter  pt does not ambulate-uses christina lift- will have sling with her  9/7 ref. by Anastasiia Campbell, , PEG, instr. mailed, Eliquis hold approval pending-[facility] to hold Eliquis 2 days per Dr Navarro-GT  1/30-precall complete-MS    CYSTOSCOPY N/A 10/8/2018    Procedure: CYSTOSCOPY;  Surgeon: Ronel Whittington MD;  Location: [facility] OR 1ST FLR;  Service: Urology;  Laterality: N/A;  45 min    CYSTOSCOPY N/A 3/25/2019    Procedure: CYSTOSCOPY;  Surgeon: Ronel Whittington MD;  Location: [facility] OR 1ST FLR;  Service: Urology;  Laterality: N/A;  45 min    CYSTOSCOPY N/A 8/26/2019    Procedure: CYSTOSCOPY;  Surgeon: Ronel Whittington MD;  [facility];  Service: Urology;  Laterality: N/A;  45 min    CYSTOSCOPY N/A 7/2/2021    Procedure: CYSTOSCOPY;  Surgeon: Ronel Whittington MD;  [facility];  Service: Urology;  Laterality: N/A;    CYSTOSCOPY  12/23/2021    Procedure: CYSTOSCOPY;  Surgeon: Ronel Whittington MD;  [facility];  Service: Urology;;    CYSTOSCOPY  2/18/2022    Procedure: CYSTOSCOPY;  Surgeon: Ronel Whittington MD;  [facility];  Service: Urology;;    CYSTOSCOPY W/ URETERAL STENT PLACEMENT Left 5/15/2021    Procedure: CYSTOSCOPY, WITH URETERAL STENT INSERTION;  Surgeon: Levy Sánchez Jr., MD;  Location: [facility] OR 1ST FLR;  Service: Urology;  Laterality: Left;    CYSTOSCOPY WITH BIOPSY OF BLADDER N/A 1/27/2020    Procedure: CYSTOSCOPY, WITH BLADDER BIOPSY, WITH FULGURATION IF INDICATED;  Surgeon: Ronel Whittington MD;  Location: [facility] OR [address] [place] [address];  Service: Urology;  Laterality: N/A;    DILATION AND CURETTAGE OF UTERUS      FISTULOGRAM N/A 4/29/2024    Procedure:  Fistulogram;  Surgeon: RODRIGO Friedman III, MD;  Location: [facility] CATH LAB;  Service: Peripheral Vascular;  Laterality: N/A;    FISTULOGRAM Left 1/6/2025    Procedure: Fistulogram;  Surgeon: RODRIGO Friedman III, MD;  Location: [facility] CATH LAB;  Service: Peripheral Vascular;  Laterality: Left;    GALLBLADDER SURGERY  2006    HYSTERECTOMY      INJECTION FOR SENTINEL NODE IDENTIFICATION Left 8/1/2019    Procedure: INJECTION, FOR SENTINEL NODE IDENTIFICATION;  Surgeon: Samia Fulton MD;  Location: [facility] OR [address] [place] [address];  Service: General;  Laterality: Left;    INJECTION OF BOTULINUM TOXIN TYPE A N/A 10/8/2018    Procedure: INJECTION, BOTULINUM TOXIN, TYPE A 300 UNITS;  Surgeon: Ronel Whittington MD;  Location: [address];  Service: Urology;  Laterality: N/A;    INJECTION OF BOTULINUM TOXIN TYPE A N/A 3/25/2019    Procedure: INJECTION, BOTULINUM TOXIN, TYPE A 300 UNITS;  Surgeon: Ronel Whittington MD;  Location: [address];  Service: Urology;  Laterality: N/A;    INJECTION OF BOTULINUM TOXIN TYPE A N/A 8/26/2019    Procedure: INJECTION, BOTULINUM TOXIN, TYPE A 300 UNITS;  Surgeon: Ronel Whittington MD;  Location: [address];  Service: Urology;  Laterality: N/A;    MASTECTOMY Left 8/1/2019    Procedure: MASTECTOMY 23 hour stay;  Surgeon: Samia Fluton MD;  Location: [facility] OR [address] [place] [address];  Service: General;  Laterality: Left;    MEDIPORT REMOVAL Right 6/24/2022    Procedure: REMOVAL, CATHETER, CENTRAL VENOUS, TUNNELED, WITH PORT;  Surgeon: Isauro Anderson MD;  Location: [facility] CATH LAB;  Service: Radiology;  Laterality: Right;    OVARIAN CYST SURGERY  1985    PERCUTANEOUS TRANSLUMINAL ANGIOPLASTY OF ARTERIOVENOUS FISTULA Left 4/29/2024    Procedure: PTA, AV FISTULA;  Surgeon: RODRIGO Friedman III, MD;  Location: [facility] CATH LAB;  Service: Peripheral Vascular;  Laterality: Left;    REPLACEMENT OF STENT Left 12/23/2021    Procedure: REPLACEMENT, STENT;  Surgeon: Ronel Whittington MD;  Location: [facility]  "OR 1ST FLR;  Service: Urology;  Laterality: Left;    REPLACEMENT OF STENT Left 2/18/2022    Procedure: REPLACEMENT, STENT;  Surgeon: Ronel Whittington MD;  Location: [facility] OR 1ST FLR;  Service: Urology;  Laterality: Left;    RETROGRADE PYELOGRAPHY Left 2/18/2022    Procedure: PYELOGRAM, RETROGRADE;  Surgeon: Ronel Whittington MD;  Location: [facility] OR 1ST FLR;  Service: Urology;  Laterality: Left;    SENTINEL LYMPH NODE BIOPSY Left 8/1/2019    Procedure: BIOPSY, LYMPH NODE, SENTINEL;  Surgeon: Samia Fulton MD;  Location: [facility] OR 2ND FLR;  Service: General;  Laterality: Left;    spt placement      TONSILLECTOMY, ADENOIDECTOMY      TOTAL ABDOMINAL HYSTERECTOMY W/ BILATERAL SALPINGOOPHORECTOMY  1985    URETEROSCOPY Left 2/18/2022    Procedure: URETEROSCOPY;  Surgeon: Ronel Whittington MD;  [facility];  Service: Urology;  Laterality: Left;       Review of patient's allergies indicates:  No Known Allergies    Medications:  Medications Prior to Admission   Medication Sig    albuterol (PROVENTIL/VENTOLIN HFA) 90 mcg/actuation inhaler Inhale 1-2 puffs into the lungs every 6 (six) hours as needed for Wheezing or Shortness of Breath. Rescue    amLODIPine (NORVASC) 10 MG tablet TAKE 1 TABLET BY MOUTH EVERY DAY    anastrozole (ARIMIDEX) 1 mg Tab TAKE 1 TABLET BY MOUTH EVERY DAY    BD INSULIN SYRINGE ULTRA-FINE 0.5 mL 31 gauge x 5/16" Syrg USE WITH INSULIN 4 TIMES A DAY    carvediloL (COREG) 12.5 MG tablet Take 1 tablet (12.5 mg total) by mouth 2 (two) times daily.    diclofenac sodium (VOLTAREN) 1 % Gel Apply 2 g topically 2 (two) times daily as needed (arthritis).    DULoxetine (CYMBALTA) 20 MG capsule TAKE 2 CAPSULES(40 MG) BY MOUTH DAILY    ELIQUIS 5 mg Tab TAKE 1 TABLET BY MOUTH TWICE A DAY    erenumab-aooe (AIMOVIG AUTOINJECTOR) 140 mg/mL autoinjector 140 mg MONTHLY (route: subcutaneous)    ergocalciferol (ERGOCALCIFEROL) 50,000 unit Cap Take 1 capsule (50,000 Units total) by mouth every 7 days. " "   furosemide (LASIX) 80 MG tablet Take 1 tablet (80 mg total) by mouth once daily.    galcanezumab-gnlm 120 mg/mL Syrg Inject 120 mg into the skin every 28 days. maintenance dose    HYDROcodone-acetaminophen (NORCO)  mg per tablet Take 1 tablet by mouth every 6 (six) hours as needed for Pain.    ipratropium (ATROVENT) 21 mcg (0.03 %) nasal spray USE 2 SPRAYS IN EACH NOSTRIL TWICE DAILY    lactulose (CHRONULAC) 10 gram/15 mL solution Take 20 g by mouth 2 (two) times daily as needed (constipation).    LANTUS SOLOSTAR U-100 INSULIN 100 unit/mL (3 mL) InPn pen INJECT 12-15 UNITS UNDER THE SKIN at night    losartan (COZAAR) 100 MG tablet TAKE 1 TABLET(100 MG) BY MOUTH DAILY    magnesium oxide (MAG-OX) 400 mg (241.3 mg magnesium) tablet TAKE 1 TABLET(400 MG) BY MOUTH DAILY    methocarbamoL (ROBAXIN) 750 MG Tab TAKE 1 TO 2 TABLETS(750 TO 1500 MG) BY MOUTH THREE TIMES DAILY AS NEEDED FOR MUSCLE SPASMS    oxybutynin (DITROPAN-XL) 10 MG 24 hr tablet TAKE 1 TABLET BY MOUTH EVERY DAY    oxyCODONE (ROXICODONE) 10 mg Tab immediate release tablet Take 1 tablet (10 mg total) by mouth daily as needed (Severe pain).    pen needle, diabetic (BD ULTRA-FINE SHORT PEN NEEDLE) 31 gauge x 5/16" Ndle USE WITH LANTUS DAILY, e 11.65    sodium bicarbonate 650 MG tablet Take 1 tablet (650 mg total) by mouth once daily.    sucroferric oxyhydroxide (VELPHORO) 500 mg Chew Take by mouth 3 (three) times daily with meals.    sumatriptan (IMITREX) 100 MG tablet Take 1 tablet (100 mg total) by mouth every 2 (two) hours as needed for Migraine (Limit 2 in 14 hours and 9 in one month).    SYNTHROID 50 mcg tablet TAKE 1 TABLET BY MOUTH BEFORE BREAKFAST. ADMINISTER ON AN EMPTY STOMACH AT LEAST 30 MINUTES BEFORE FOOD. IF RECEIVING TUBE FEEDS, HOLD TUBE FEEDS FOR 1 HOUR BEFORE AND AFTER LEVOTHYROXINE ADMINISTRATION.    traZODone (DESYREL) 100 MG tablet TAKE 1 TABLET BY MOUTH NIGHTLY AS NEEDED FOR INSOMNIA.     Antibiotics (From admission, onward)      " Start     Stop Route Frequency Ordered    06/12/25 1100  ertapenem (INVANZ) 500 mg in 0.9% NaCl 100 mL IVPB         -- IV Every 24 hours (non-standard times) 06/12/25 0955    06/12/25 1100  mupirocin 2 % ointment         06/17/25 0859 Nasl 2 times daily 06/12/25 0956          Antifungals (From admission, onward)      None          Antivirals (From admission, onward)      None             Immunization History   Administered Date(s) Administered    COVID-19 Vaccine 02/25/2021, 03/25/2021    COVID-19, MRNA, LN-S, PF (MODERNA FULL 0.5 ML DOSE) 02/25/2021, 03/25/2021    COVID-19, MRNA, LN-S, PF (Pfizer) (Purple Cap) 12/24/2021    Hepatitis B 11/03/2023, 12/08/2023    Hepatitis B, Adult 11/03/2023, 12/08/2023, 07/19/2024, 08/16/2024, 09/20/2024, 12/20/2024    Influenza (FLUAD) - Quadrivalent - Adjuvanted - PF *Preferred* (65+) 01/30/2023    Influenza - Quadrivalent 10/01/2014    Influenza - Quadrivalent - PF *Preferred* (6 months and older) 09/27/2023    Influenza - Trivalent - Fluarix, Flulaval, Fluzone, Afluria - PF 09/27/2024    Influenza - Trivalent - Fluzone High Dose - PF (65 years and older) 09/27/2017, 10/31/2018, 09/25/2019    PPD Test 07/01/2021, 12/21/2021, 01/03/2022, 01/25/2022    Pneumococcal Conjugate - 13 Valent 09/27/2017    Pneumococcal Polysaccharide - 23 Valent 09/19/2016, 01/30/2023    Zoster 12/16/2013       Family History       Problem Relation (Age of Onset)    ALS Mother    Cancer Maternal Grandmother, Paternal Grandfather, Brother    Diabetes Sister, Maternal Aunt, Maternal Uncle    Kidney disease Sister, Maternal Aunt    Prostate cancer Brother    Scoliosis Brother          Social History     Socioeconomic History    Marital status:     Number of children: 0    Highest education level: Master's degree (e.g., MA, MS, Lelo, MEd, MSW, BANDAR)   Occupational History    Occupation: teacher     Comment: retired   Tobacco Use    Smoking status: Never     Passive exposure: Past    Smokeless  tobacco: Never   Substance and Sexual Activity    Alcohol use: No     Alcohol/week: 0.0 standard drinks of alcohol    Drug use: No    Sexual activity: Not Currently     Birth control/protection: Abstinence   Social History Narrative    Single ()             Brother passed away last year.  Pt lives her her sister. (5/27)     Social Drivers of Health     Financial Resource Strain: Low Risk  (6/10/2025)    Overall Financial Resource Strain (CARDIA)     Difficulty of Paying Living Expenses: Not hard at all   Recent Concern: Financial Resource Strain - Medium Risk (4/17/2025)    Overall Financial Resource Strain (CARDIA)     Difficulty of Paying Living Expenses: Somewhat hard   Food Insecurity: No Food Insecurity (6/10/2025)    Hunger Vital Sign     Worried About Running Out of Food in the Last Year: Never true     Ran Out of Food in the Last Year: Never true   Transportation Needs: No Transportation Needs (6/10/2025)    PRAPARE - Transportation     Lack of Transportation (Medical): No     Lack of Transportation (Non-Medical): No   Physical Activity: Inactive (4/17/2025)    Exercise Vital Sign     Days of Exercise per Week: 0 days     Minutes of Exercise per Session: 0 min   Stress: No Stress Concern Present (6/10/2025)    [employer] - Occupational Stress Questionnaire     Feeling of Stress : Not at all   Housing Stability: Low Risk  (6/10/2025)    Housing Stability Vital Sign     Unable to Pay for Housing in the Last Year: No     Number of Times Moved in the Last Year: 0     Homeless in the Last Year: No     Review of Systems   Constitutional:  Negative for chills, diaphoresis and fever.   HENT:  Negative for sore throat.    Respiratory:  Negative for cough, shortness of breath and stridor.    Gastrointestinal:  Negative for abdominal pain, diarrhea, nausea and vomiting.   Genitourinary:  Positive for hematuria. Negative for frequency.   Musculoskeletal:  Negative for arthralgias.    Skin:  Negative for color change and wound.   Neurological:  Negative for weakness.   Psychiatric/Behavioral:  Negative for confusion.    All other systems reviewed and are negative.    Objective:     Vital Signs (Most Recent):  Temp: 98 °F (36.7 °C) (06/12/25 0749)  Pulse: 66 (06/12/25 0822)  Resp: 18 (06/12/25 0822)  BP: (!) 143/74 (06/12/25 0749)  SpO2: 99 % (06/12/25 0822) Vital Signs (24h Range):  Temp:  [98 °F (36.7 °C)-99 °F (37.2 °C)] 98 °F (36.7 °C)  Pulse:  [64-89] 66  Resp:  [17-20] 18  SpO2:  [95 %-99 %] 99 %  BP: (143-181)/() 143/74     Weight: 98.2 kg (216 lb 7.9 oz)  Body mass index is 32.92 kg/m².    Estimated Creatinine Clearance: 22.2 mL/min (A) (based on SCr of 2.8 mg/dL (H)).     Physical Exam  Vitals and nursing note reviewed.   Constitutional:       General: She is not in acute distress.     Appearance: Normal appearance.   HENT:      Head: Normocephalic and atraumatic.      Nose: Nose normal.      Mouth/Throat:      Mouth: Mucous membranes are moist.   Eyes:      Extraocular Movements: Extraocular movements intact.      Conjunctiva/sclera: Conjunctivae normal.   Cardiovascular:      Rate and Rhythm: Normal rate and regular rhythm.      Heart sounds: Normal heart sounds. No murmur heard.  Pulmonary:      Effort: Pulmonary effort is normal. No respiratory distress.      Breath sounds: Normal breath sounds.      Comments: On 2L NC  Abdominal:      General: Abdomen is flat. There is no distension.      Tenderness: There is no abdominal tenderness.      Comments: SPC in place, no surrounding redness or drainage, urine yellow  Some moisture dermatitis in abdominal fold   Musculoskeletal:         General: Normal range of motion.      Cervical back: Normal range of motion.   Skin:     General: Skin is warm and dry.      Capillary Refill: Capillary refill takes less than 2 seconds.   Neurological:      Mental Status: She is alert and oriented to person, place, and time.   Psychiatric:          Mood and Affect: Mood normal.         Behavior: Behavior normal.          Significant Labs: Blood Culture:   Recent Labs   Lab 12/31/24  0937 01/02/25  0526 01/02/25  0529   LABBLOO Gram stain aer bottle: Gram positive cocci in chains resembling Strep  Gram stain zelda bottle: Gram positive cocci in chains resembling Strep  Results called to and read back by: Sean Hernandez RN  01/01/2025  10:31  ENTEROCOCCUS FAECALIS  For susceptibility see order #[id number]  *  Gram stain aer bottle: Gram positive cocci in chains resembling Strep  Gram stain zelda bottle: Gram positive cocci in chains resembling Strep  Results called to and read back by: Sean Hernandez RN  01/01/2025  10:31  ENTEROCOCCUS FAECALIS* No growth after 5 days. No growth after 5 days.     CBC:   Recent Labs   Lab 06/11/25  0322 06/12/25  0326   WBC 6.42 9.31   HGB 9.0* 9.5*   HCT 27.7* 30.2*    263     CMP:   Recent Labs   Lab 06/11/25  0322 06/12/25  0326   * 128*   K 3.8 4.1   CL 98 99   CO2 17* 18*   * 199*   BUN 35* 33*   CREATININE 3.1* 2.8*   CALCIUM 7.7* 7.7*   PROT 5.9* 6.3   ALBUMIN 2.5* 2.8*   BILITOT 0.2 0.2   ALKPHOS 114 109   AST 12 27   ALT 11 12   ANIONGAP 12 11     Microbiology Results (last 7 days)       Procedure Component Value Units Date/Time    Urine culture [[id number]]  (Abnormal)  (Susceptibility) Collected: 06/10/25 0238    Order Status: Completed Specimen: Urine, Supra Pubic Updated: 06/12/25 0815     Urine Culture 50,000 - 99,999 cfu/ml Escherichia coli ESBL     Comment: No other significant isolate.       Urine culture [[id number]]  (Abnormal) Collected: 06/09/25 1908    Order Status: Completed Specimen: Urine Updated: 06/10/25 2202     Urine Culture 50,000 - 99,999 cfu/ml Gram-negative Rods     Comment: Identification and susceptibility pending         50,000 - 99,999 cfu/ml Gram-negative Rods     Comment: Identification and susceptibility pending       Culture, Respiratory with Gram Stain [[id number]]      Order Status: Sent Specimen: Respiratory     Influenza A & B by Molecular [[id number]]  (Normal) Collected: 06/09/25 1807    Order Status: Completed Specimen: Nasopharyngeal Swab Updated: 06/09/25 1849     INFLUENZA A MOLECULAR Negative     INFLUENZA B MOLECULAR  Negative          Urine Culture:   Recent Labs   Lab 12/31/24  1049 04/16/25  2106 05/24/25  1130 06/09/25  1908 06/10/25  0238   LABURIN KLEBSIELLA PNEUMONIAE ESBL  50,000 - 99,999 cfu/ml  *  PROTEUS MIRABILIS  10,000 - 49,999 cfu/ml  * >100,000 cfu/ml Escherichia coli*  >100,000 cfu/ml Enterobacter cloacae* Multiple organisms isolated. None in predominance. Repeat if clinically necessary. 50,000 - 99,999 cfu/ml Gram-negative Rods*  50,000 - 99,999 cfu/ml Gram-negative Rods* 50,000 - 99,999 cfu/ml Escherichia coli ESBL*     Urine Studies:   Recent Labs   Lab 12/31/24  1049 04/16/25  2106 06/10/25  0238   COLORU Yellow   < > Brown*   APPEARANCEUA Cloudy*   < > Cloudy*   PHUR 6.0   < > 6.0   SPECGRAV 1.025   < > 1.020   PROTEINUA 3+*   < > 3+*   GLUCUA Trace*   < > 2+*   KETONESU 1+*  --   --    BILIRUBINUA Negative   < > Negative   OCCULTUA 2+*   < > 3+*   NITRITE Negative   < > Negative   UROBILINOGEN  --    < > Negative   LEUKOCYTESUR 3+*   < > 3+*   RBCUA 14*   < > >100*   WBCUA >100*   < > >100*   BACTERIA Many*   < > Rare   SQUAMEPITHEL 11  --   --    HYALINECASTS 0   < > 0    < > = values in this interval not displayed.       Significant Imaging: I have reviewed all pertinent imaging results/findings within the past 24 hours.

## 2025-06-12 NOTE — PROGRESS NOTES
[facility]    The patient will need Ertapenem 500 mg after HD on MWF until 7/14/25. I spoke to Linda at [facility] and they will administer it. The medication will be delivered to the facility.    [facility]  Virginia Fair RN, Clinical Educator  Cell [phone]  Office [phone]  Fax [phone]

## 2025-06-12 NOTE — PROGRESS NOTES
Therapy with gentamicin complete and/or consult discontinued by provider.  Pharmacy will sign off, please re-consult as needed.    Thanks,      Zion Casas, LibanD

## 2025-06-12 NOTE — ASSESSMENT & PLAN NOTE
Anemia is likely due to chronic disease due to ESRD. Most recent hemoglobin and hematocrit are listed below.  Recent Labs     06/10/25  0731 06/11/25  0322 06/12/25  0326   HGB 11.1* 9.0* 9.5*   HCT 36.9* 27.7* 30.2*     Plan  - Monitor serial CBC: Daily  - Transfuse PRBC if patient becomes hemodynamically unstable, symptomatic or H/H drops below 7/21.  - Patient has not received any PRBC transfusions to date  - Patient's anemia is currently stable

## 2025-06-12 NOTE — PROGRESS NOTES
[facility]  Nephrology  Progress Note    Patient Name: Cecile Bowen  MRN: [id number]  Admission Date: 6/9/2025  Hospital Length of Stay: 2 days  Attending Provider: Isauro Curran MD   Primary Care Physician: Lurdes Navarro MD  Principal Problem:UTI (urinary tract infection)    Subjective:     Interval History: HD yesterday with 2 L removed. No distress on exam.     Review of patient's allergies indicates:  No Known Allergies  Current Facility-Administered Medications   Medication Frequency    acetaminophen tablet 650 mg Q6H PRN    albuterol-ipratropium 2.5 mg-0.5 mg/3 mL nebulizer solution 3 mL Q6H WAKE    amLODIPine tablet 10 mg Daily    anastrozole tablet 1 mg Daily    apixaban tablet 5 mg BID    carvediloL tablet 12.5 mg BID    dextromethorphan-guaiFENesin  mg per 12 hr tablet 1 tablet BID    dextrose 50% injection 12.5 g PRN    dextrose 50% injection 25 g PRN    DULoxetine DR capsule 40 mg Daily    epoetin chloe injection 9,800 Units Every Mon, Wed, Fri    ertapenem (INVANZ) 500 mg in 0.9% NaCl 100 mL IVPB Q24H    furosemide tablet 80 mg Daily    glucagon (human recombinant) injection 1 mg PRN    glucose chewable tablet 16 g PRN    glucose chewable tablet 24 g PRN    guaiFENesin 100 mg/5 ml syrup 200 mg Q4H PRN    HYDROcodone-acetaminophen  mg per tablet 1 tablet Q6H PRN    insulin aspart U-100 pen 0-5 Units QID (AC + HS) PRN    insulin glargine U-100 (Lantus) pen 8 Units QHS    lactulose 20 gram/30 mL solution Soln 20 g BID PRN    levothyroxine tablet 50 mcg Before breakfast    LIDOcaine 5 % patch 1 patch Q24H    losartan tablet 100 mg Daily    methocarbamoL tablet 750 mg TID PRN    mupirocin 2 % ointment BID    naloxone 0.4 mg/mL injection 0.02 mg PRN    ondansetron injection 4 mg Q8H PRN    oxybutynin 24 hr tablet 10 mg Daily    oxyCODONE immediate release tablet Tab 10 mg Daily PRN    polyethylene glycol packet 17 g Daily    predniSONE tablet 40 mg Daily     senna-docusate 8.6-50 mg per tablet 1 tablet BID    sevelamer carbonate tablet 800 mg TID WM    sodium chloride 0.9% flush 10 mL Q12H PRN    traZODone tablet 100 mg QHS     Facility-Administered Medications Ordered in Other Encounters   Medication Frequency    epoetin chloe injection 2,000 Units 1 time in Clinic/HOD    heparin (porcine) injection 2,000 Units Once    heparin (porcine) injection 2,000 Units Once    heparin (porcine) injection 4,000 Units 1 time in Clinic/HOD    iron sucrose injection 100 mg 1 time in Clinic/HOD       Objective:     Vital Signs (Most Recent):  Temp: 98.3 °F (36.8 °C) (06/12/25 1129)  Pulse: 81 (06/12/25 1129)  Resp: 18 (06/12/25 1129)  BP: (!) 148/67 (06/12/25 1129)  SpO2: 97 % (06/12/25 1129) Vital Signs (24h Range):  Temp:  [98 °F (36.7 °C)-99 °F (37.2 °C)] 98.3 °F (36.8 °C)  Pulse:  [64-89] 81  Resp:  [17-20] 18  SpO2:  [95 %-99 %] 97 %  BP: (143-181)/(67-89) 148/67     Weight: 98.2 kg (216 lb 7.9 oz) (06/11/25 0400)  Body mass index is 32.92 kg/m².  Body surface area is 2.17 meters squared.    I/O last 3 completed shifts:  In: 360 [P.O.:60; Other:300]  Out: 3300 [Urine:700; Other:2600]     Physical Exam  Vitals and nursing note reviewed.   Constitutional:       Appearance: She is obese.   HENT:      Mouth/Throat:      Pharynx: Oropharynx is clear.   Cardiovascular:      Rate and Rhythm: Normal rate.      Pulses: Normal pulses.   Pulmonary:      Effort: Pulmonary effort is normal. No respiratory distress.      Breath sounds: No wheezing.   Abdominal:      Palpations: Abdomen is soft.   Musculoskeletal:      Right lower leg: No edema.      Left lower leg: No edema.   Skin:     General: Skin is dry.   Neurological:      Mental Status: She is alert and oriented to person, place, and time.   Psychiatric:         Mood and Affect: Mood normal.          Significant Labs:  CBC:   Recent Labs   Lab 06/12/25  0326   WBC 9.31   RBC 3.15*   HGB 9.5*   HCT 30.2*      MCV 96   MCH 30.2    MCHC 31.5*     CMP:   Recent Labs   Lab 06/12/25  0326   *   CALCIUM 7.7*   ALBUMIN 2.8*   PROT 6.3   *   K 4.1   CO2 18*   CL 99   BUN 33*   CREATININE 2.8*   ALKPHOS 109   ALT 12   AST 27   BILITOT 0.2     All labs within the past 24 hours have been reviewed.     Assessment/Plan:     Renal/  * UTI (urinary tract infection)  - defer to primary team     ESRD (end stage renal disease) on dialysis  Nephrology History  iHD Schedule: [facility]  Unit/MD: [facility]/Dr. Hare   Duration: 4 hours   EDW: 98 kg.   Access: [facility] AVF   Residual Renal Function: present   Last HD prior to presentation: 6/9/25        Plan/Recommendations:      - No urgent indication for RRT. Next HD tomorrow 6/13.  - Hgb goal 10-11, at goal  - Hyponatremia in setting of ESRD, 1L fluid restrictions. UF with HD tomorrow   - continue sevelamer        Thank you for your consult. I will follow-up with patient. Please contact us if you have any additional questions.    Mayra Porter DNP, FNP-C  Nephrology  [facility]

## 2025-06-12 NOTE — ASSESSMENT & PLAN NOTE
-Pt reports hematuria starting today. Denies abdominal pain or fever/chills.  -Afebrile, no leukocytosis; 0/4 SIRS  -Initial UA obtained from chronic suprapubic murillo, concern for contaminated sample given catheter has been in place for almost 4 weeks.  -Nursing exchanged suprapubic catheter 6/9.  -Repeat UA infectious  -start gentamicin based on previous cultures  -Suprapubic catheter care per nursing.  -urine cultures show ecoli, started on ertapenem.   - ID consulted, appreciate recs  - Plan to continue IV abx outpatient after HD session.

## 2025-06-12 NOTE — NURSING
Pt discharged home via [facility] transport. IV dc'd. DC instructions reviewed with patientg. Pt. Verbalized understanding.

## 2025-06-12 NOTE — ASSESSMENT & PLAN NOTE
Grade I invasive ductal carcinoma ER+, HI+ s/p radical mastectomy with sentinel lymph node bx   -Follows with oncology  -Continue anastrazole

## 2025-06-12 NOTE — ASSESSMENT & PLAN NOTE
Pt reports shortness of breath and productive cough x6-7 days. Intermittent wheezing. Denies rhinorrhea, sore throat, fever, or chills. Possible bronchitis or undiagnosed COPD or hypervolemia; pt reports she has never been a smoker but notes her father and brother that she previously lived with did smoke.  -Afebrile, no leukocytosis.  -  -CXR with pulmonary findings suggest edema, bronchial airway inflammation or infection not excluded. No large focal consolidation.   -CT chest with scatter groudn glass opacities, enlargement of pulmonary trunk  -Sputum cx pending.  -Flu/COVID negative.  -Duo nebs q6h while awake and PRN.   -Will start mucinex DM and prednisone 40mg qd  -PFTs ordered outpatient but have not been completed.  - likely 2/2 volume overload   - Sob improving  - Now satting 97% on RA

## 2025-06-12 NOTE — ASSESSMENT & PLAN NOTE
74 y.o. female, wheelchair bound, with a PMHx of HTN, HLD, hypothyroidism, DM2, urinary retention s/p suprapubic catheter placement, and ESRD on HD ([facility]). Admitted for concern for shortness of breath and UTI. Afebrile. No leukocytosis. CXR and CT with pulmonary edema and new scattered ground-glass opacities. Pt on NC. UA collected after murillo exchanged, now with urine culture growing E coli ESBL. S/p dose of gentamicin based on prior cultures. Urinary symptoms resolved.      Recommendations / Plan:  Start IV Ertapenem 500 mg q24h daily for duration of 10 days, to continue on discharge  after HD sessions ([facility]) (last dose on 6/20/25). She is doing an additional HD session on 6/14.  See antibiotic plan below.  Needs outpatient labs, please fax to HD center      -- Discussed with ID staff and primary team.  -- ID will sign off at this time.       Outpatient Antibiotic Therapy Plan:    Please send referral to [facility].    1) Infection: E coli ESBL UTI with hx of suprapubic catheter     2) Discharge Antibiotics:    Intravenous antibiotics:  Ertapenem 500 mg IV q 24 hours     3) Therapy Duration:  10 days    Estimated end date of IV antibiotics: 6/20/25    4) Outpatient Weekly Labs:    Order the following labs to be drawn on Mondays:   CBC  CMP     5) Fax Lab Results to Infectious Diseases Provider: JEFFERY Bal    [facility] Fax Number: [phone]    6) Outpatient Infectious Diseases Follow-up    Follow-up appointment will be arranged by the ID clinic and will be found in the patient's appointments tab.    Prior to discharge, please ensure the patient's follow-up has been scheduled.    If there is still no follow-up scheduled prior to discharge, please send an [facility]gent [facility] message to [facility] [place] or Call Infectious Diseases Dept.

## 2025-06-12 NOTE — ASSESSMENT & PLAN NOTE
Hyponatremia is likely due to hyervolemia. The patient's most recent sodium results are listed below.  Recent Labs     06/10/25  0731 06/11/25  0322 06/12/25  0326   * 127* 128*     Plan  - currently asymptomatic  - HD later today  - monitor daily

## 2025-06-12 NOTE — PLAN OF CARE
Problem: Skin Injury Risk Increased  Goal: Skin Health and Integrity  Outcome: Progressing     Problem: Adult Inpatient Plan of Care  Goal: Plan of Care Review  Outcome: Progressing  Goal: Patient-Specific Goal (Individualized)  Outcome: Progressing  Goal: Absence of Hospital-Acquired Illness or Injury  Outcome: Progressing  Goal: Optimal Comfort and Wellbeing  Outcome: Progressing     Problem: Diabetes Comorbidity  Goal: Blood Glucose Level Within Targeted Range  Outcome: Progressing     Problem: Acute Kidney Injury/Impairment  Goal: Fluid and Electrolyte Balance  Outcome: Progressing  Goal: Improved Oral Intake  Outcome: Progressing     Problem: Wound  Goal: Optimal Coping  Outcome: Progressing  Goal: Optimal Functional Ability  Outcome: Progressing  Goal: Absence of Infection Signs and Symptoms  Outcome: Progressing  Goal: Optimal Pain Control and Function  Outcome: Progressing

## 2025-06-12 NOTE — SUBJECTIVE & OBJECTIVE
Interval History: NAEON. HDS. Patient reports feeling well. Notes breathing has been improving. Weaned of oxygen. Urine cultures show ecoli, started on ertapenem. ID following, appreciate recs. Plan to continue IV antibiotics with HD session outpatient.     Review of Systems   Constitutional:  Negative for chills and fever.   Respiratory:  Positive for cough. Negative for chest tightness and shortness of breath (improving).    Cardiovascular:  Negative for chest pain and leg swelling.   Gastrointestinal:  Negative for abdominal pain and nausea.   Genitourinary:  Positive for hematuria.   Neurological:  Negative for dizziness and weakness.     Objective:     Vital Signs (Most Recent):  Temp: 98.3 °F (36.8 °C) (06/12/25 1129)  Pulse: 81 (06/12/25 1129)  Resp: 18 (06/12/25 1129)  BP: (!) 148/67 (06/12/25 1129)  SpO2: 97 % (06/12/25 1129) Vital Signs (24h Range):  Temp:  [98 °F (36.7 °C)-99 °F (37.2 °C)] 98.3 °F (36.8 °C)  Pulse:  [64-89] 81  Resp:  [18-20] 18  SpO2:  [95 %-99 %] 97 %  BP: (143-181)/(67-89) 148/67     Weight: 98.2 kg (216 lb 7.9 oz)  Body mass index is 32.92 kg/m².    Intake/Output Summary (Last 24 hours) at 6/12/2025 1408  Last data filed at 6/12/2025 0534  Gross per 24 hour   Intake --   Output 250 ml   Net -250 ml         Physical Exam  Vitals and nursing note reviewed.   Constitutional:       Appearance: She is well-developed.   Eyes:      Pupils: Pupils are equal, round, and reactive to light.   Cardiovascular:      Rate and Rhythm: Normal rate and regular rhythm.   Pulmonary:      Effort: Pulmonary effort is normal.      Breath sounds: Normal breath sounds.   Abdominal:      Palpations: Abdomen is soft.      Tenderness: There is no abdominal tenderness.   Musculoskeletal:         General: No tenderness.   Skin:     General: Skin is warm and dry.   Neurological:      Mental Status: She is alert and oriented to person, place, and time.   Psychiatric:         Behavior: Behavior normal.                Significant Labs: All pertinent labs within the past 24 hours have been reviewed.  CBC:   Recent Labs   Lab 06/11/25  0322 06/12/25  0326   WBC 6.42 9.31   HGB 9.0* 9.5*   HCT 27.7* 30.2*    263     CMP:   Recent Labs   Lab 06/11/25  0322 06/12/25  0326   * 128*   K 3.8 4.1   CL 98 99   CO2 17* 18*   * 199*   BUN 35* 33*   CREATININE 3.1* 2.8*   CALCIUM 7.7* 7.7*   PROT 5.9* 6.3   ALBUMIN 2.5* 2.8*   BILITOT 0.2 0.2   ALKPHOS 114 109   AST 12 27   ALT 11 12   ANIONGAP 12 11       Significant Imaging: I have reviewed all pertinent imaging results/findings within the past 24 hours.  CT Chest Without Contrast  Narrative: EXAMINATION:  CT CHEST WITHOUT CONTRAST    CLINICAL HISTORY:  Cough, persistent    TECHNIQUE:  Low dose axial images, sagittal and coronal reformations were obtained from the thoracic inlet to the lung bases. Contrast was not administered.    COMPARISON:  Chest x-ray 06/09/2025, CT renal stone study 06/30/2021, CT chest 05/22/2021    FINDINGS:  SOFT TISSUES: No axillary or subpectoral lymphadenopathy. Several thyroid calcifications, unchanged.  Dependent body wall edema overlying the flanks.    HEART AND MEDIASTINUM: No mediastinal or hilar lymphadenopathy. Heart is normal size.  No pericardial effusion.  Multi-vessel coronary artery calcific atherosclerosis.  Dense calcification of the mitral annulus.  Aortic valvular calcification.  Pulmonary trunk is enlarged measuring 4.4 cm.  Left-sided aortic arch.    LUNGS, PLEURA, AND AIRWAYS: Airways are patent. Mosaic attenuation throughout the lungs, similar to prior.  Stable 0.6 cm solid nodule in the left lower lobe.  Stable pulmonary micronodule in the right upper lobe (series 302 image 156) and several additional stable smaller micro nodules.  Patchy ground-glass opacities throughout bilateral lungs not definitely seen on prior, for example in the right upper lobe (series 302, image 91) and left upper lobe (series 302, image  192).No pleural effusion or pneumothorax.    UPPER ABDOMEN: Cholecystectomy.  Bilateral atrophic kidneys.  Air in the left renal collecting system with mildly asymmetric left perinephric fat stranding.  Left urothelial thickening.    BONES: Degenerative changes.  No acute fracture or aggressive osseous lesion.  Impression: 1. New scattered ground-glass opacities, nonspecific but may reflect a nonspecific infectious or non-infectious inflammatory process.  Pulmonary edema may present similarly.  Correlation advised.  2. Air in the left renal collecting system with urothelial thickening concerning for a gas-forming urinary tract infection.  Recommend correlation with urinalysis and any history of  tract instrumentation.  3. Several unchanged bilateral pulmonary nodules.  4. Similar mosaic attenuation throughout the lungs possibly representing small airways disease.  5. Enlargement of the pulmonary trunk which may be seen the setting of pulmonary hypertension.  6. Additional findings as above.  This report was flagged in Epic as abnormal.    Electronically signed by resident: Ki Zavala  Date:    06/10/2025  Time:    02:18    Electronically signed by: Ion Roth  Date:    06/10/2025  Time:    03:24

## 2025-06-12 NOTE — PLAN OF CARE
Problem: Skin Injury Risk Increased  Goal: Skin Health and Integrity  6/12/2025 1740 by Gogo Ray RN  Outcome: Adequate for Care Transition  6/12/2025 1305 by Gogo Ray RN  Outcome: Progressing     Problem: Adult Inpatient Plan of Care  Goal: Plan of Care Review  6/12/2025 1740 by Gogo Ray RN  Outcome: Adequate for Care Transition  6/12/2025 1305 by Gogo Ray RN  Outcome: Progressing  Goal: Patient-Specific Goal (Individualized)  6/12/2025 1740 by Gogo Ray RN  Outcome: Adequate for Care Transition  6/12/2025 1305 by Gogo Ray RN  Outcome: Progressing  Goal: Absence of Hospital-Acquired Illness or Injury  6/12/2025 1740 by Gogo Ray RN  Outcome: Adequate for Care Transition  6/12/2025 1305 by Gogo Ray RN  Outcome: Progressing  Goal: Optimal Comfort and Wellbeing  6/12/2025 1740 by Gogo Ray RN  Outcome: Adequate for Care Transition  6/12/2025 1305 by Gogo Ray RN  Outcome: Progressing  Goal: Readiness for Transition of Care  6/12/2025 1740 by Gogo Ray RN  Outcome: Adequate for Care Transition  6/12/2025 1305 by Gogo Ray RN  Outcome: Progressing     Problem: Diabetes Comorbidity  Goal: Blood Glucose Level Within Targeted Range  6/12/2025 1740 by Gogo Ray RN  Outcome: Adequate for Care Transition  6/12/2025 1305 by Gogo Ray RN  Outcome: Progressing     Problem: Acute Kidney Injury/Impairment  Goal: Fluid and Electrolyte Balance  6/12/2025 1740 by Gogo Ray RN  Outcome: Adequate for Care Transition  6/12/2025 1305 by Gogo Ray RN  Outcome: Progressing  Goal: Improved Oral Intake  6/12/2025 1740 by Gogo Ray RN  Outcome: Adequate for Care Transition  6/12/2025 1305 by Gogo Ray RN  Outcome: Progressing  Goal: Effective Renal Function  6/12/2025 1740 by Cory  Gogo REYES RN  Outcome: Adequate for Care Transition  6/12/2025 1305 by Gogo Ray RN  Outcome: Progressing     Problem: Wound  Goal: Optimal Coping  6/12/2025 1740 by Gogo Ray RN  Outcome: Adequate for Care Transition  6/12/2025 1305 by Gogo Ray RN  Outcome: Progressing  Goal: Optimal Functional Ability  6/12/2025 1740 by Gogo Ray RN  Outcome: Adequate for Care Transition  6/12/2025 1305 by Gogo Ray RN  Outcome: Progressing  Goal: Absence of Infection Signs and Symptoms  6/12/2025 1740 by Gogo Ray RN  Outcome: Adequate for Care Transition  6/12/2025 1305 by Gogo Ray RN  Outcome: Progressing  Goal: Improved Oral Intake  6/12/2025 1740 by Gogo Ray RN  Outcome: Adequate for Care Transition  6/12/2025 1305 by Gogo Ray RN  Outcome: Progressing  Goal: Optimal Pain Control and Function  6/12/2025 1740 by Gogo Ray RN  Outcome: Adequate for Care Transition  6/12/2025 1305 by Gogo Ray RN  Outcome: Progressing  Goal: Skin Health and Integrity  6/12/2025 1740 by Gogo Ray RN  Outcome: Adequate for Care Transition  6/12/2025 1305 by Gogo Ray RN  Outcome: Progressing  Goal: Optimal Wound Healing  6/12/2025 1740 by Gogo Ray RN  Outcome: Adequate for Care Transition  6/12/2025 1305 by Gogo Ray RN  Outcome: Progressing     Problem: Fall Injury Risk  Goal: Absence of Fall and Fall-Related Injury  6/12/2025 1740 by Gogo Ray RN  Outcome: Adequate for Care Transition  6/12/2025 1305 by Gogo Ray RN  Outcome: Progressing     Problem: Infection  Goal: Absence of Infection Signs and Symptoms  6/12/2025 1740 by Gogo Ray RN  Outcome: Adequate for Care Transition  6/12/2025 1305 by Ray, Gogo S, RN  Outcome: Progressing     Problem: Hypertension Acute  Goal: Blood  Pressure Within Desired Range  6/12/2025 1740 by Gogo Ray RN  Outcome: Adequate for Care Transition  6/12/2025 1305 by Gogo Ray RN  Outcome: Progressing     Problem: Self-Care Deficit  Goal: Improved Ability to Complete Activities of Daily Living  6/12/2025 1740 by Gogo Ray RN  Outcome: Adequate for Care Transition  6/12/2025 1305 by Gogo Ray RN  Outcome: Progressing     Problem: Activity Intolerance  Goal: Enhanced Capacity and Energy  6/12/2025 1740 by Gogo Ray RN  Outcome: Adequate for Care Transition  6/12/2025 1305 by Gogo Ray RN  Outcome: Progressing     Problem: Hemodialysis  Goal: Safe, Effective Therapy Delivery  6/12/2025 1740 by Gogo Ray RN  Outcome: Adequate for Care Transition  6/12/2025 1305 by Gogo Ray RN  Outcome: Progressing  Goal: Effective Tissue Perfusion  6/12/2025 1740 by Gogo Ray RN  Outcome: Adequate for Care Transition  6/12/2025 1305 by Gogo Ray RN  Outcome: Progressing  Goal: Absence of Infection Signs and Symptoms  6/12/2025 1740 by Gogo Ray RN  Outcome: Adequate for Care Transition  6/12/2025 1305 by Gogo Ray RN  Outcome: Progressing

## 2025-06-12 NOTE — PLAN OF CARE
CM arranged ambulance transport via [facility]. Requested  time is 1530.  Requested  time does not guarantee arrival time.  If transport does not arrive by 1630 please contact assigned SW or on-call for assistance.  Will continue to update plan as needed.  Nithin Botello RN,BSN

## 2025-06-12 NOTE — PLAN OF CARE
[facility] - Observ[facility] 11[facility]      HOME HEALTH ORDERS  FACE TO FACE ENCOUNTER    Patient Name: Cecile Bowen  YOB: 1950    PCP: Lurdes Navarro MD   PCP Address: [address] [facility] / [place] [address]  PCP Phone Number: [phone]  PCP Fax: [phone]    Encounter Date: 6/9/25    Admit to Home Health    Diagnoses:  Active Hospital Problems    Diagnosis  POA    *UTI (urinary tract infection) [N39.0]  Yes    Hematuria [R31.9]  Yes    Shortness of breath [R06.02]  Yes    Chronic suprapubic catheter [Z93.59]  Not Applicable    Type 2 diabetes mellitus with kidney complication, with long-term current use of insulin [E11.29, Z79.4]  Not Applicable    Primary hypertension [I10]  Yes     Chronic    ESRD (end stage renal disease) on dialysis [N18.6, Z99.2]  Not Applicable    Anemia in ESRD (end-stage renal disease) [N18.6, D63.1]  Yes    Unspecified atrial fibrillation [I48.91]  Yes    Prophylactic use of anastrozole (Arimidex) [Z79.811]  Not Applicable    Malignant neoplasm of left breast, estrogen receptor positive [C50.912, Z17.0]  Not Applicable     Chronic    Chronic pain [G89.29]  Yes     Chronic    Chronic daily headache [R51.9]  Yes    Class 1 obesity due to excess calories with serious comorbidity in adult [E66.811, E66.09]  Yes    Insomnia [G47.00]  Yes    Major depressive disorder, recurrent, moderate [F33.1]  Yes    Hyponatremia [E87.1]  Yes     Chronic    VIJAYA (obstructive sleep apnea) [G47.33]  Yes     Chronic    Hyperlipidemia [E78.5]  Yes    Fibromyalgia [M79.7]  Yes     Chronic    Hypothyroidism [E03.9]  Yes     Chronic      Resolved Hospital Problems   No resolved problems to display.       Follow Up Appointments:  Future Appointments   Date Time Provider Department Center   6/13/2025 10:45 AM CHAIR 13, [facility]   6/16/2025 10:45 AM CHAIR 13, [facility]   6/18/2025 10:45 AM CHAIR 13, [facility] [place]  [facility]   6/20/2025 10:45 AM CHAIR 13, [facility]   6/23/2025 10:45 AM CHAIR 13, [facility] KIDNEY CARE [facility]   6/25/2025 10:45 AM CHAIR 13, [facility]   6/27/2025 10:45 AM CHAIR 13, [facility] K[facility]NEY [facility]   6/30/2025 10:45 AM CHAIR 13, [facility]NEY [facility]   7/2/2025 10:45 AM CHAIR 13, [facility]   7/3/2025 10:00 AM Tesha Stafford MD [facility] PHYSMED [address]   7/4/2025 10:45 AM CHAIR 13, [facility]NEY [facility]   7/7/2025 10:45 AM CHAIR 13, [facility]   7/8/2025 To Be Determined Emmanuelle Paul, NP [address] [place]   7/9/2025 10:45 AM CHAIR 13, [facility]NEY [facility]   7/11/2025 10:45 AM CHAIR 13, [facility]   7/14/2025 10:45 AM CHAIR 13, [facility]NEY [facility]   7/15/2025 12:00 [facility] [address]   7/15/2025 12:15 [facility] [address]   7/15/2025 12:30 [facility] [address]   7/15/2025  1:00 PM Elias Pearson MD [facility] PULMSVC [address]   7/16/2025 10:45 AM CHAIR 13, [facility] KIDNEY [facility]   7/18/2025 10:45 AM CHAIR 13, [facility]NEY [facility]   7/21/2025 10:45 AM CHAIR 13, [facility]   7/23/2025 10:45 AM CHAIR 13, [facility]NEY [facility]   7/25/2025 10:45 AM CHAIR 13, [facility]NEY [facility]   7/28/2025 10:45 AM CHAIR 13, [facility]NEY [facility]   7/30/2025 10:45 AM CHAIR 13, [facility]   8/1/2025 10:45 AM CHAIR 13, [facility]   8/4/2025 10:45 AM CHAIR 13, [facility]   8/6/2025 10:45 AM CHAIR 13, [facility]   8/8/2025 10:45 AM CHAIR 13, [facility]  [facility]   8/11/2025 10:45 AM CHAIR 13, [facility] KIDNEY [facility]   8/13/2025 10:45 AM CHAIR 13, [facility]   8/15/2025 10:45 AM CHAIR 13, [facility]   8/18/2025 10:45 AM CHAIR 13, [facility]   8/20/2025 10:45 AM CHAIR 13, [facility]   8/22/2025 10:45 AM CHAIR 13, [facility]   8/25/2025 10:45 AM CHAIR 13, [facility]   8/26/2025  9:45 AM VASCULAR, LAB [facility] [address]AS[facility] [address]   8/26/2025 10:30 AM RODRIGO Friedman III, MD [facility] VASCSUR [facility] [address]   8/27/2025 10:45 AM CHAIR 13, [facility]   8/29/2025 10:45 AM CHAIR 13, [facility]       Allergies:Review of patient's allergies indicates:  No Known Allergies    Medications: Review discharge medications with patient and family and provide education.    Current Medications[1]     Medication List        START taking these medications      0.9% NaCl SolP 100 mL with ertapenem 1 gram SolR 500 mg  Inject 500 mg into the vein 3 (three) times a week. Ertapenem to be administered three times a week for 9 doses after HD treatment. for 9 doses  Start taking on: June 13, 2025     dextromethorphan-guaiFENesin  mg  mg per 12 hr tablet  Commonly known as: MUCINEX DM  Take 1 tablet by mouth 2 (two) times daily. for 10 days     predniSONE 20 MG tablet  Commonly known as: DELTASONE  Take 2 tablets (40 mg total) by mouth once daily. for 1 day  Start taking on: June 13, 2025            CONTINUE taking these medications      AIMOVIG AUTOINJECTOR 140 mg/mL autoinjector  Generic drug: erenumab-aooe  140 mg MONTHLY (route: subcutaneous)     albuterol 90 mcg/actuation inhaler  Commonly known as: PROVENTIL/VENTOLIN HFA  Inhale 1-2 puffs into the lungs every 6 (six) hours as needed for Wheezing or Shortness of  "Breath. Rescue     amLODIPine 10 MG tablet  Commonly known as: NORVASC  TAKE 1 TABLET BY MOUTH EVERY DAY     anastrozole 1 mg Tab  Commonly known as: ARIMIDEX  TAKE 1 TABLET BY MOUTH EVERY DAY     BD INSULIN SYRINGE ULTRA-FINE 0.5 mL 31 gauge x 5/16" Syrg  Generic drug: insulin syringe-needle U-100  USE WITH INSULIN 4 TIMES A DAY     carvediloL 12.5 MG tablet  Commonly known as: COREG  Take 1 tablet (12.5 mg total) by mouth 2 (two) times daily.     diclofenac sodium 1 % Gel  Commonly known as: VOLTAREN  Apply 2 g topically 2 (two) times daily as needed (arthritis).     DULoxetine 20 MG capsule  Commonly known as: CYMBALTA  TAKE 2 CAPSULES(40 MG) BY MOUTH DAILY     ELIQUIS 5 mg Tab  Generic drug: apixaban  TAKE 1 TABLET BY MOUTH TWICE A DAY     ergocalciferol 50,000 unit Cap  Commonly known as: ERGOCALCIFEROL  Take 1 capsule (50,000 Units total) by mouth every 7 days.     furosemide 80 MG tablet  Commonly known as: LASIX  Take 1 tablet (80 mg total) by mouth once daily.     galcanezumab-gnlm 120 mg/mL Syrg  Inject 120 mg into the skin every 28 days. maintenance dose     HYDROcodone-acetaminophen  mg per tablet  Commonly known as: NORCO  Take 1 tablet by mouth every 6 (six) hours as needed for Pain.     ipratropium 21 mcg (0.03 %) nasal spray  Commonly known as: ATROVENT  USE 2 SPRAYS IN EACH NOSTRIL TWICE DAILY     lactulose 10 gram/15 mL solution  Commonly known as: CHRONULAC  Take 20 g by mouth 2 (two) times daily as needed (constipation).     LANTUS SOLOSTAR U-100 INSULIN 100 unit/mL (3 mL) Inpn pen  Generic drug: insulin glargine U-100 (Lantus)  INJECT 12-15 UNITS UNDER THE SKIN at night     losartan 100 MG tablet  Commonly known as: COZAAR  TAKE 1 TABLET(100 MG) BY MOUTH DAILY     magnesium oxide 400 mg (241.3 mg magnesium) tablet  Commonly known as: MAG-OX  TAKE 1 TABLET(400 MG) BY MOUTH DAILY     methocarbamoL 750 MG Tab  Commonly known as: ROBAXIN  TAKE 1 TO 2 TABLETS(750 TO 1500 MG) BY MOUTH THREE " "TIMES DAILY AS NEEDED FOR MUSCLE SPASMS     oxybutynin 10 MG 24 hr tablet  Commonly known as: DITROPAN-XL  TAKE 1 TABLET BY MOUTH EVERY DAY     oxyCODONE 10 mg Tab immediate release tablet  Commonly known as: ROXICODONE  Take 1 tablet (10 mg total) by mouth daily as needed (Severe pain).     pen needle, diabetic 31 gauge x 5/16" Ndle  Commonly known as: BD ULTRA-FINE SHORT PEN NEEDLE  USE WITH LANTUS DAILY, e 11.65     sodium bicarbonate 650 MG tablet  Take 1 tablet (650 mg total) by mouth once daily.     sumatriptan 100 MG tablet  Commonly known as: IMITREX  Take 1 tablet (100 mg total) by mouth every 2 (two) hours as needed for Migraine (Limit 2 in 14 hours and 9 in one month).     SYNTHROID 50 mcg tablet  Generic drug: levothyroxine  TAKE 1 TABLET BY MOUTH BEFORE BREAKFAST. ADMINISTER ON AN EMPTY STOMACH AT LEAST 30 MINUTES BEFORE FOOD. IF RECEIVING TUBE FEEDS, HOLD TUBE FEEDS FOR 1 HOUR BEFORE AND AFTER LEVOTHYROXINE ADMINISTRATION.     traZODone 100 MG tablet  Commonly known as: DESYREL  TAKE 1 TABLET BY MOUTH NIGHTLY AS NEEDED FOR INSOMNIA.     VELPHORO 500 mg Chew  Generic drug: sucroferric oxyhydroxide  Take by mouth 3 (three) times daily with meals.                I have seen and examined this patient within the last 30 days. My clinical findings that support the need for the home health skilled services and home bound status are the following:no   Weakness/numbness causing balance and gait disturbance due to Weakness/Debility and ESRD on HD making it taxing to leave home.  Requiring assistive device to leave home due to unsteady gait caused by  Weakness/Debility and ESRD on HD.     Diet:   renal diet    Labs:  SN to perform labs:  CBC: three times a week; 4 weeks and BMP: three times a week; 4 weeks    Fax Lab Results to Infectious Diseases Provider: JEFFERY Bal     [facility] Fax Number: [phone]    Referrals/ Consults  Physical Therapy to evaluate and treat. Evaluate for home safety and " equipment needs; Establish/upgrade home exercise program. Perform / instruct on therapeutic exercises, gait training, transfer training, and Range of Motion.  Occupational Therapy to evaluate and treat. Evaluate home environment for safety and equipment needs. Perform/Instruct on transfers, ADL training, ROM, and therapeutic exercises.  Aide to provide assistance with personal care, ADLs, and vital signs.    Activities:   activity as tolerated    Nursing:   Agency to admit patient within 24 hours of hospital discharge unless specified on physician order or at patient request    SN to complete comprehensive assessment including routine vital signs. Instruct on disease process and s/s of complications to report to MD. Review/verify medication list sent home with the patient at time of discharge  and instruct patient/caregiver as needed. Frequency may be adjusted depending on start of care date.     Skilled nurse to perform up to 3 visits PRN for symptoms related to diagnosis    Notify MD if SBP > 160 or < 90; DBP > 90 or < 50; HR > 120 or < 50; Temp > 101; O2 < 88%; Other:       Ok to schedule additional visits based on staff availability and patient request on consecutive days within the home health episode.    When multiple disciplines ordered:    Start of Care occurs on Sunday - Wednesday schedule remaining discipline evaluations as ordered on separate consecutive days following the start of care.    Thursday SOC -schedule subsequent evaluations Friday and Monday the following week.     Friday - Saturday SOC - schedule subsequent discipline evaluations on consecutive days starting Monday of the following week.    For all post-discharge communication and subsequent orders please contact patient's primary care physician. If unable to reach primary care physician or do not receive response within 30 minutes, please contact [facility] for clinical staff order clarification      Home Health Aide:  Nursing Three times  weekly, Physical Therapy Three times weekly, Occupational Therapy Three times weekly, and Home Health Aide Three times weekly    Wound Care Orders  no    I certify that this patient is confined to her home and needs intermittent skilled nursing care, physical therapy, and occupational therapy.    Preet Isaacs PA-C  Department of Hospital Medicine             [1]   Current Facility-Administered Medications   Medication Dose Route Frequency Provider Last Rate Last Admin    acetaminophen tablet 650 mg  650 mg Oral Q6H PRN Jocelyne Acevedo, NP        albuterol-ipratropium 2.5 mg-0.5 mg/3 mL nebulizer solution 3 mL  3 mL Nebulization Q6H WAKE Jocelyne Acevedo, NP   3 mL at 06/12/25 0822    amLODIPine tablet 10 mg  10 mg Oral Daily Jocelyne Acevedo NP   10 mg at 06/12/25 0815    anastrozole tablet 1 mg  1 mg Oral Daily Jocelyne Acevedo, NP   1 mg at 06/12/25 0815    apixaban tablet 5 mg  5 mg Oral BID Jocelyne Acevedo, NP   5 mg at 06/12/25 0814    carvediloL tablet 12.5 mg  12.5 mg Oral BID Jocelyne Acevedo, NP   12.5 mg at 06/12/25 0815    dextromethorphan-guaiFENesin  mg per 12 hr tablet 1 tablet  1 tablet Oral BID Jocelyne Acevedo, NP   1 tablet at 06/12/25 0814    dextrose 50% injection 12.5 g  12.5 g Intravenous PRN Jocelyne Acevedo NP        dextrose 50% injection 25 g  25 g Intravenous PRN Jocelyne Acevedo NP        DULoxetine DR capsule 40 mg  40 mg Oral Daily Jocelyne Acevedo, NP   40 mg at 06/12/25 0813    epoetin chloe injection 9,800 Units  100 Units/kg Intravenous Every Mon, Wed, Fri Sabina Carranza MD   9,800 Units at 06/11/25 1203    ertapenem (INVANZ) 500 mg in 0.9% NaCl 100 mL IVPB  500 mg Intravenous Q24H Steffanie Jarrett PA-C   Stopped at 06/12/25 1156    furosemide tablet 80 mg  80 mg Oral Daily Jocelyne Acevedo, NP   80 mg at 06/12/25 0814    glucagon (human recombinant) injection 1 mg  1 mg Intramuscular PRN Jocelyne Acevedo, NP        glucose chewable  tablet 16 g  16 g Oral PRN Jocelyne Acevedo NP        glucose chewable tablet 24 g  24 g Oral PRN Jocelyne Acevedo NP        guaiFENesin 100 mg/5 ml syrup 200 mg  200 mg Oral Q4H PRN Fletcher Bowen PA-C   200 mg at 06/1950    HYDROcodone-acetaminophen  mg per tablet 1 tablet  1 tablet Oral Q6H PRN Jocelyne Acevedo NP   1 tablet at 06/12/25 0602    insulin aspart U-100 pen 0-5 Units  0-5 Units Subcutaneous QID (AC + HS) PRN Jocelyne Acevedo NP   2 Units at 06/12/25 1136    insulin glargine U-100 (Lantus) pen 8 Units  8 Units Subcutaneous QHS Fletcher Bowen PA-C   8 Units at 06/11/25 2134    lactulose 20 gram/30 mL solution Soln 20 g  20 g Oral BID PRN Jocelyne Acevedo NP        levothyroxine tablet 50 mcg  50 mcg Oral Before breakfast Jocelyne Acevedo NP   50 mcg at 06/12/25 0601    LIDOcaine 5 % patch 1 patch  1 patch Transdermal Q24H Fletcher Bowen PA-C   1 patch at 06/11/25 2130    losartan tablet 100 mg  100 mg Oral Daily Jocelyne Acevedo NP   100 mg at 06/12/25 0814    methocarbamoL tablet 750 mg  750 mg Oral TID PRN Jocelyne Acevedo NP   750 mg at 06/11/25 2151    mupirocin 2 % ointment   Nasal BID Isauro Curran MD   Given at 06/12/25 1118    naloxone 0.4 mg/mL injection 0.02 mg  0.02 mg Intravenous PRN Jocelyne Acevedo NP        ondansetron injection 4 mg  4 mg Intravenous Q8H PRN Jocelyne Acevedo NP        oxybutynin 24 hr tablet 10 mg  10 mg Oral Daily Jocelyne Acevedo, NP   10 mg at 06/12/25 0814    oxyCODONE immediate release tablet Tab 10 mg  10 mg Oral Daily PRN Jocelyne Acevedo NP   10 mg at 06/10/25 1049    polyethylene glycol packet 17 g  17 g Oral Daily Jocelyne Acevedo, NP        predniSONE tablet 40 mg  40 mg Oral Daily Jocelyne Acevedo, NP   40 mg at 06/12/25 0813    senna-docusate 8.6-50 mg per tablet 1 tablet  1 tablet Oral BID Jocelyne Acevedo, NP   1 tablet at 06/11/25 2133    sevelamer carbonate tablet 800 mg  800 mg Oral TID WM  Preet Isaacs PA-C   800 mg at 06/12/25 1136    sodium chloride 0.9% flush 10 mL  10 mL Intravenous Q12H PRN Jocelyne Acevedo NP        traZODone tablet 100 mg  100 mg Oral QHS Jocelyne Acevedo NP   100 mg at 06/11/25 2132     Facility-Administered Medications Ordered in Other Encounters   Medication Dose Route Frequency Provider Last Rate Last Admin    epoetin chloe injection 2,000 Units  2,000 Units Intravenous 1 time in Clinic/HOD Emmanuel Hare Jr., MD        heparin (porcine) injection 2,000 Units  2,000 Units Intravenous Once Emmanuel Hare Jr., MD        heparin (porcine) injection 2,000 Units  2,000 Units Intravenous Once Emmanuel Hare Jr., MD        heparin (porcine) injection 4,000 Units  4,000 Units Intra-Catheter 1 time in Clinic/HOD Emmanuel Hare Jr., MD        iron sucrose injection 100 mg  100 mg Intravenous 1 time in Clinic/HOD Emmanuel Hare Jr., MD

## 2025-06-12 NOTE — HPI
74 y.o. female, wheelchair bound, with a PMHx of HTN, HLD, hypothyroidism, DM2, migraines, anemia, fibromyalgia, insomnia, depression, chronic pain, VIJAYA, urinary retention s/p suprapubic catheter placement, and ESRD on HD (MWF).    Presented for hematuria that started this morning, and about a week of shortness of breath, wheezing, and productive cough. No improvement in respiratory symptoms after HD session. Reports hematuria or pink tinged urine is her ONLY typical UTI symptoms. She gets frequent UTIs. She denies dysuria, flank pain, abdominal pain, fever/chills, N/VD, CP, congestion, or sore throat. Her SPC was last exchanged in April (~4/17/25).       In the ED, Afebrile. No leukocytosis. UA with >100 RBCs, >100 WBCs, and many bacteria (urine sample obtained from chronic suprapubic catheter that was last changed about 4 weeks ago). Urine culture from first UA is growing multiple GNR. Suprapubic catheter exchanged 6/9. New urine specimen collected after new murillo. Prior urine cultures from 4/16 grew E coli and E. Cloacae. Patient was given dose of gentamicin on 6/10. Most recent urine culture resulted in E coli ESBL.    CXR with pulmonary findings suggest edema, but no large focal consolidation. CT chest with new scattered ground-glass opacities and urothelial thickening concerning UTI.     ID consulted for antibiotic recs. Reports her symptoms have much improved since arrival. No longer on oxygen and her urine is clear.

## 2025-06-13 ENCOUNTER — PATIENT OUTREACH (OUTPATIENT)
Dept: ADMINISTRATIVE | Facility: CLINIC | Age: 75
End: 2025-06-13
Payer: MEDICARE

## 2025-06-13 LAB
BACTERIA UR CULT: ABNORMAL

## 2025-06-13 NOTE — DISCHARGE SUMMARY
"[facility] [address] [place] [address] Medicine  Discharge Summary      Patient Name: Cecile Bowen  MRN: [id number]  LISA: [id number]  Patient Class: IP- Inpatient  Admission Date: 6/9/2025  Hospital Length of Stay: 2 days  Discharge Date and Time: 6/12/2025  5:43 PM  Attending Physician: Ania att. providers found   Discharging Provider: Preet Isaacs PA-C  Primary Care Provider: Lurdes Navarro MD  [facility] Medicine Team: [facility] HOSP MED E Preet Isaacs PA-C  Primary Care Team: [facility] HOSP MED E    HPI:   Cecile Bowen is a 74 y.o. female with a PMHx of HTN, HLD, hypothyroidism, DM2, migraines, anemia, fibromyalgia, insomnia, depression, chronic pain, VIJAYA, urinary retention s/p suprapubic catheter placement, and ESRD on HD (MWF) who presents for evaluation of hematuria, shortness of breath, and cough. She reports shortness of breath, intermittent wheezing, and cough productive of "greyish" sputum x6-7 days. Notes she has had this issue several times in the past and thought she may have some extra fluid. She went to her dialysis appointment today and had a 4 hour session completed. She reports she was still having the symptoms after HD and is under her usual dry weight. She also notes hematuria beginning this morning. Reports she typically has this when she is getting a UTI. She denies abdominal pain, fever/chills, N/VD, CP, congestion, or sore throat. She does endorse BLE edema, right leg greater than left, that is chronic and better than baseline. Pt reports she is wheelchair bound at baseline and uses a christina lift to transfer.    In the ED, VSS, afebrile. CBC with stable anemia. Na 133. Bicarb 22. Glucose 120. Cr 1.8 (hx of ESRD). Calcium 8.4. Albumin 3.0. . . HS troponin 14. EKG shows SR w/ PACs, 78 bpm, no ST elevation. Flu/COVID negative. UA with 3+ leukocytes, >100 RBCs, >100 WBCs, and many bacteria; urine sample obtained from chronic suprapubic catheter that was last changed about " 4 weeks ago. Will have nursing exchange catheter and obtain new UA. CXR with pulmonary findings suggest edema, bronchial airway inflammation or infection not excluded. No large focal consolidation. The patient received a duo neb treatment, prednisone 40mg, and keflex.    * No surgery found *      Hospital Course:   Patient admitted to hospital medicine for SOB and UTI. Patient SOB 2/2 volume overload as she recently ran out of her lasix. Nephrology consulted for HD. Patient noted to have UTI, HX of ESBL and MDRO. Started gentamicin for now based on previous cultures.  Final urine cultures show ecoli. ID consulted, ertapenem started. Plan for IV abx after HD outpatient. Qeaned of oxygen, tolerating RA well and satting in high 90s. Follow up requested for ACAH and HH infusion.     Plan and medication changes discussed with patient; agreeable to plan. ER precautions were given. All questions were answered to the patient's satisfaction and she was subsequently discharged.      Physical Exam  Gen: in NAD, appears stated age  Neuro: AAOx3, motor, sensory, and strength grossly intact BL  HEENT: EOMI, PERRLA; no JVD appreciated  CVS: RRR, no m/r/g  Resp: lungs CTAB, no w/r/r; no belabored breathing or accessory muscle use appreciated   Abd: NTND, soft to palpation  Extrem: no UE or LE edema BL         Goals of Care Treatment Preferences:  Code Status: Full Code    Health care agent: Pooja Sánchez (sister)  Health care agent number: [phone] cell                   SDOH Screening:  The patient was screened for utility difficulties, food insecurity, transport difficulties, housing insecurity, and interpersonal safety and there were no concerns identified this admission.     Consults:   Consults (From admission, onward)          Status Ordering Provider     Inpatient consult to Infectious Diseases  Once        Provider:  (Not yet assigned)    Completed HARRY ALFONSO     Inpatient consult to Nephrology  Once         Provider:  (Not yet assigned)    Completed JOSE CARLOS FULTON            Assessment & Plan    Final Active Diagnoses:    Diagnosis Date Noted POA    PRINCIPAL PROBLEM:  UTI (urinary tract infection) [N39.0] 02/24/2015 Yes    Hematuria [R31.9] 06/10/2025 Yes    Shortness of breath [R06.02] 05/17/2025 Yes    Chronic suprapubic catheter [Z93.59] 04/17/2025 Not Applicable    Type 2 diabetes mellitus with kidney complication, with long-term current use of insulin [E11.29, Z79.4] 06/12/2024 Not Applicable    Primary hypertension [I10] 06/12/2024 Yes     Chronic    ESRD (end stage renal disease) on dialysis [N18.6, Z99.2] 02/11/2022 Not Applicable    Anemia in ESRD (end-stage renal disease) [N18.6, D63.1] 01/25/2022 Yes    Unspecified atrial fibrillation [I48.91] 11/29/2021 Yes    Prophylactic use of anastrozole (Arimidex) [Z79.811] 01/31/2020 Not Applicable    Malignant neoplasm of left breast, estrogen receptor positive [C50.912, Z17.0] 08/01/2019 Not Applicable     Chronic    Chronic pain [G89.29] 03/15/2018 Yes     Chronic    Chronic daily headache [R51.9] 01/20/2017 Yes    Class 1 obesity due to excess calories with serious comorbidity in adult [E66.811, E66.09] 01/09/2017 Yes    Insomnia [G47.00] 03/10/2016 Yes    Major depressive disorder, recurrent, moderate [F33.1] 03/10/2016 Yes    Hyponatremia [E87.1] 09/22/2014 Yes     Chronic    VIJAYA (obstructive sleep apnea) [G47.33] 03/17/2014 Yes     Chronic    Hyperlipidemia [E78.5] 03/11/2014 Yes    Fibromyalgia [M79.7] 12/17/2012 Yes     Chronic    Hypothyroidism [E03.9] 12/12/2012 Yes     Chronic      Problems Resolved During this Admission:       Discharged Condition: good    Disposition: Home or Self Care    Follow Up:   Contact information for follow-up providers       ProviderFrancois    Specialty: Internal Medicine  Contact information:  [address] [facility]  [address]  [facility] [address]               Lurdes Navarro MD Follow up.    Specialty:  Internal Medicine  Contact information:  [address] [facility]  [place] [address]  [phone]                       Contact information for after-discharge care       Dialysis/Infusion       [facility] [place] .    Service: Infusion and IV Therapy  Contact information:  [facility] [address]  [phone]                     Home Medical Care       [facility] .    Service: Home Health Services  Why: A representative will be calling you for your first visit. If you do not hear from them within 24 hours of discharge, please call them with the number provided.  Contact information:  [address] [facility]  [facility] [address]  [phone]                                 Patient Instructions:      Ambulatory referral[facility]ult [facility]   Standing Status: Future   Referral Priority: Routine Referral Type: Consultation   Referred to Provider: KINGA PROVIDER Requested Specialty: Internal Medicine   Number of Visits Requested: 1     Ambulatory referral/consult to Outpatient Infusion and Home Infusion   Standing Status: Future   Referral Priority: Routine Referral Type: Home Health Care   Referral Reason: Specialty Services Required   Requested Specialty: Home Health Services   Number of Visits Requested: 1     Notify your health care provider if you experience any of the following:  temperature >100.4     Notify your health care provider if you experience any of the following:  increased confusion or weakness     Notify your health care provider if you experience any of the following:  persistent dizziness, light-headedness, or visual disturbances     Notify your health care provider if you experience any of the following:  severe uncontrolled pain     Activity as tolerated       Significant Diagnostic Studies: Labs: CMP   Recent Labs   Lab 06/12/25  0326   *   K 4.1   CL 99   CO2 18*   GLU  "199*   BUN 33*   CREATININE 2.8*   CALCIUM 7.7*   PROT 6.3   ALBUMIN 2.8*   BILITOT 0.2   ALKPHOS 109   AST 27   ALT 12   ANIONGAP 11    and CBC   Recent Labs   Lab 06/12/25  0326   WBC 9.31   HGB 9.5*   HCT 30.2*          Pending Diagnostic Studies:       None           Medications:  Reconciled Home Medications:      Medication List        START taking these medications      0.9% NaCl SolP 100 mL with ertapenem 1 gram SolR 500 mg  Inject 500 mg into the vein 3 (three) times a week. Ertapenem to be administered three times a week for 9 doses after HD treatment. for 9 doses     dextromethorphan-guaiFENesin  mg  mg per 12 hr tablet  Commonly known as: MUCINEX DM  Take 1 tablet by mouth 2 (two) times daily. for 10 days     predniSONE 20 MG tablet  Commonly known as: DELTASONE  Take 2 tablets (40 mg total) by mouth once daily. for 1 day            CONTINUE taking these medications      AIMOVIG AUTOINJECTOR 140 mg/mL autoinjector  Generic drug: erenumab-aooe  140 mg MONTHLY (route: subcutaneous)     albuterol 90 mcg/actuation inhaler  Commonly known as: PROVENTIL/VENTOLIN HFA  Inhale 1-2 puffs into the lungs every 6 (six) hours as needed for Wheezing or Shortness of Breath. Rescue     amLODIPine 10 MG tablet  Commonly known as: NORVASC  TAKE 1 TABLET BY MOUTH EVERY DAY     anastrozole 1 mg Tab  Commonly known as: ARIMIDEX  TAKE 1 TABLET BY MOUTH EVERY DAY     BD INSULIN SYRINGE ULTRA-FINE 0.5 mL 31 gauge x 5/16" Syrg  Generic drug: insulin syringe-needle U-100  USE WITH INSULIN 4 TIMES A DAY     carvediloL 12.5 MG tablet  Commonly known as: COREG  Take 1 tablet (12.5 mg total) by mouth 2 (two) times daily.     diclofenac sodium 1 % Gel  Commonly known as: VOLTAREN  Apply 2 g topically 2 (two) times daily as needed (arthritis).     DULoxetine 20 MG capsule  Commonly known as: CYMBALTA  TAKE 2 CAPSULES(40 MG) BY MOUTH DAILY     ELIQUIS 5 mg Tab  Generic drug: apixaban  TAKE 1 TABLET BY MOUTH TWICE A " "DAY     ergocalciferol 50,000 unit Cap  Commonly known as: ERGOCALCIFEROL  Take 1 capsule (50,000 Units total) by mouth every 7 days.     furosemide 80 MG tablet  Commonly known as: LASIX  Take 1 tablet (80 mg total) by mouth once daily.     galcanezumab-gnlm 120 mg/mL Syrg  Inject 120 mg into the skin every 28 days. maintenance dose     HYDROcodone-acetaminophen  mg per tablet  Commonly known as: NORCO  Take 1 tablet by mouth every 6 (six) hours as needed for Pain.     ipratropium 21 mcg (0.03 %) nasal spray  Commonly known as: ATROVENT  USE 2 SPRAYS IN EACH NOSTRIL TWICE DAILY     lactulose 10 gram/15 mL solution  Commonly known as: CHRONULAC  Take 20 g by mouth 2 (two) times daily as needed (constipation).     LANTUS SOLOSTAR U-100 INSULIN 100 unit/mL (3 mL) Inpn pen  Generic drug: insulin glargine U-100 (Lantus)  INJECT 12-15 UNITS UNDER THE SKIN at night     losartan 100 MG tablet  Commonly known as: COZAAR  TAKE 1 TABLET(100 MG) BY MOUTH DAILY     magnesium oxide 400 mg (241.3 mg magnesium) tablet  Commonly known as: MAG-OX  TAKE 1 TABLET(400 MG) BY MOUTH DAILY     methocarbamoL 750 MG Tab  Commonly known as: ROBAXIN  TAKE 1 TO 2 TABLETS(750 TO 1500 MG) BY MOUTH THREE TIMES DAILY AS NEEDED FOR MUSCLE SPASMS     oxybutynin 10 MG 24 hr tablet  Commonly known as: DITROPAN-XL  TAKE 1 TABLET BY MOUTH EVERY DAY     oxyCODONE 10 mg Tab immediate release tablet  Commonly known as: ROXICODONE  Take 1 tablet (10 mg total) by mouth daily as needed (Severe pain).     pen needle, diabetic 31 gauge x 5/16" Ndle  Commonly known as: BD ULTRA-FINE SHORT PEN NEEDLE  USE WITH LANTUS DAILY, e 11.65     sodium bicarbonate 650 MG tablet  Take 1 tablet (650 mg total) by mouth once daily.     sumatriptan 100 MG tablet  Commonly known as: IMITREX  Take 1 tablet (100 mg total) by mouth every 2 (two) hours as needed for Migraine (Limit 2 in 14 hours and 9 in one month).     SYNTHROID 50 mcg tablet  Generic drug: " levothyroxine  TAKE 1 TABLET BY MOUTH BEFORE BREAKFAST. ADMINISTER ON AN EMPTY STOMACH AT LEAST 30 MINUTES BEFORE FOOD. IF RECEIVING TUBE FEEDS, HOLD TUBE FEEDS FOR 1 HOUR BEFORE AND AFTER LEVOTHYROXINE ADMINISTRATION.     traZODone 100 MG tablet  Commonly known as: DESYREL  TAKE 1 TABLET BY MOUTH NIGHTLY AS NEEDED FOR INSOMNIA.     VELPHORO 500 mg Chew  Generic drug: sucroferric oxyhydroxide  Take by mouth 3 (three) times daily with meals.              Indwelling Lines/Drains at time of discharge:   Lines/Drains/Airways       Drain  Duration                  Hemodialysis AV Fistula 02/14/24 1034 Left forearm 485 days         Suprapubic Catheter 06/09/25 2057 latex 16 Fr. 3 days                    Time spent on the discharge of patient: 37 minutes         Preet Isaacs PA-C  Department of Hospital Medicine  [facility]

## 2025-06-13 NOTE — PROGRESS NOTES
C3 nurse spoke with Cecile Bowen for a TCC post hospital discharge follow up call. The patient has a scheduled HOSFU appointment with Lurdes Navarro MD on 06/24/2025 @ 1100.

## 2025-06-17 ENCOUNTER — TELEPHONE (OUTPATIENT)
Dept: INTERNAL MEDICINE | Facility: CLINIC | Age: 75
End: 2025-06-17
Payer: MEDICARE

## 2025-06-17 NOTE — TELEPHONE ENCOUNTER
Copied from CRM #[id number]. Topic: Appointments - Appointment Access  >> Jun 17, 2025 10:55 AM Jordyn wrote:  Patient needs a Hosp follow up appt with their PCP only.       When is the next available appointment:  06/24/25    Symptoms:  bad UTI - on antibiotics at dialysis clinic - blood in urine.    Discharge date: 06/12/25    Needs to be seen by: 06/19/25 Th (if possible)     Would you prefer an answer via [employer]?: call back     Who called and phone number?: [phone] (M)      Comments:  Pt has dialysis on M, W, F (needs tues or thurs asap)   Pt is unaware of current HFU scheduled 06/24/25 - please make aware if that is the only date available for HFU

## 2025-06-17 NOTE — TELEPHONE ENCOUNTER
Spoke to patient and  she will keep the appointment that is schedule for 6/24/ at 11 with Dr Hurtado she dose not want to see anyone else

## 2025-06-19 ENCOUNTER — LAB REQUISITION (OUTPATIENT)
Dept: LAB | Facility: HOSPITAL | Age: 75
End: 2025-06-19
Payer: MEDICARE

## 2025-06-19 DIAGNOSIS — N39.0 URINARY TRACT INFECTION, SITE NOT SPECIFIED: ICD-10-CM

## 2025-06-19 LAB
ALBUMIN SERPL BCP-MCNC: 2.7 G/DL (ref 3.5–5.2)
ALP SERPL-CCNC: 110 UNIT/L (ref 40–150)
ALT SERPL W/O P-5'-P-CCNC: 15 UNIT/L (ref 10–44)
ANION GAP (OHS): 10 MMOL/L (ref 8–16)
AST SERPL-CCNC: 18 UNIT/L (ref 11–45)
BILIRUB SERPL-MCNC: 0.2 MG/DL (ref 0.1–1)
BUN SERPL-MCNC: 23 MG/DL (ref 8–23)
CALCIUM SERPL-MCNC: 8.1 MG/DL (ref 8.7–10.5)
CHLORIDE SERPL-SCNC: 99 MMOL/L (ref 95–110)
CO2 SERPL-SCNC: 24 MMOL/L (ref 23–29)
CREAT SERPL-MCNC: 2.7 MG/DL (ref 0.5–1.4)
ERYTHROCYTE [DISTWIDTH] IN BLOOD BY AUTOMATED COUNT: 16.3 % (ref 11.5–14.5)
GFR SERPLBLD CREATININE-BSD FMLA CKD-EPI: 18 ML/MIN/1.73/M2
GLUCOSE SERPL-MCNC: 135 MG/DL (ref 70–110)
HCT VFR BLD AUTO: 35.7 % (ref 37–48.5)
HGB BLD-MCNC: 10.7 GM/DL (ref 12–16)
MCH RBC QN AUTO: 29.5 PG (ref 27–31)
MCHC RBC AUTO-ENTMCNC: 30 G/DL (ref 32–36)
MCV RBC AUTO: 98 FL (ref 82–98)
PLATELET # BLD AUTO: 308 K/UL (ref 150–450)
PMV BLD AUTO: 9.3 FL (ref 9.2–12.9)
POTASSIUM SERPL-SCNC: 3.6 MMOL/L (ref 3.5–5.1)
PROT SERPL-MCNC: 5.6 GM/DL (ref 6–8.4)
RBC # BLD AUTO: 3.63 M/UL (ref 4–5.4)
SODIUM SERPL-SCNC: 133 MMOL/L (ref 136–145)
WBC # BLD AUTO: 5.86 K/UL (ref 3.9–12.7)

## 2025-06-19 PROCEDURE — 82040 ASSAY OF SERUM ALBUMIN: CPT | Mod: HCNC | Performed by: PHYSICIAN ASSISTANT

## 2025-06-19 PROCEDURE — 85027 COMPLETE CBC AUTOMATED: CPT | Mod: HCNC | Performed by: PHYSICIAN ASSISTANT

## 2025-06-21 NOTE — PROGRESS NOTES
CC: Hosp follow up        74 y.o. female patient presents in f/u to hospitalization       Admitted: 6/9/2025       D/C: 6/12/2025    Family and/or Caretaker present at visit?  N/a  Diagnostic tests reviewed/disposition: lab  Disease/illness education: UTI, respiratory tx  Home health/community services discussion/referrals: n/a   Establishment or re-establishment of referral orders for community resources:  n/a  Discussion with other health care providers:  n/a         75 yo female w/  HTN, HLD, hypothyroidism, DM2, migraines, anemia, fibromyalgia, insomnia, depression, chronic pain, VIJAYA, urinary retention s/p suprapubic catheter placement, and ESRD on HD (MWF) who presented to ED for evaluation of hematuria, shortness of breath, and cough.  Pt admitted for SOB and UTI(hx ESBL and MDRO)            Since discharge:       Sx: hematuria improving, slight   + wheezing improved, using albuterol  Overall, feels at least 75% better              Medcard:       ROS:         No fever, chills, or night sweats         ++ allergies and PND, no ear pain/pressure or sinus         No visual changes or d/c         No dysphagia or early satiety;       Appetite fair             No PND or orthopnea         No chest pain or palpitations         No weight change or LE edema         ++ bruising, especially LUE-fistula, but has improved        Remainder of review negative except as previously noted    PMHX: Reviewed  PSHX: Reviewed  FHX:    Reviewed  SHX:    Reviewed    PE:  VSS  GEN: WDWN, A&O, NAD, conversant and co-operative  EYES: Conj/lids unremarkable, sclera anicteric  ENT: Nasal injected w/o exudate; o/p injected w/o exudate  Sinus NT  NECK: Supple w/o lymphadenopathy or thyromegaly  RESP: Efforts unlabored, lungs scattered rhonchi w/o wheezing or rales  CV: Heart RRR w/o mgr, No carotid bruits noted, 1+/ dim pedal pulses , + LE edema  GI/: Abdomen BS+; suprapubic cath w/ attached reservoir and concentrated urine, no cathie blood  noted  MSK: Gait impaired- wheelchair; No CCE noted  NEURO: DONALD. No facial asymmetry or confusion noted  SKIN: Warm and dry    IMPRESSION:  Hospital d/c f/up  UTI, E. Coli  -ertapenem 1 gm IV 3 x weekly @ HD for a total of 10 treatments- COMPLETED  Hematuria nearly resolved  SOB, improved 75%  -continue albuterol inhaler prn  -Consult Pulmonary pending  Chronic suprapubic catheter, stable  -under care of Urology  T2DM w/ kidney complication on HD, stable -A1C 5.4  -continue Lantus 12-15 U pm   -continue diabetic diet  HTN, stable  -continue losartan 100mg  -continue furosemide 80 qd  -continue Amlodipine 10mg  -continue carvedilol 12.5mg BID    ESRD, on dialysis  Anemia in ESRD, H/H 10.7/35.7  Afib, stable  -continue Eliquis    Prophylactic use anastrozole  Left breast cancer, tx above  Chronic pain, stable  -under care of PMR  Chronic daily HA, stable  -under care Neurology  Class I obesity  Insomnia, stable  -continue trazodone  MDD, stable  -under care Psychiatry  HypoNa @ 128  -to monitor at HD  VIJAYA  HLD assoc DM, stable  Fibromyalgia, stable  -continue Cymbalta  Hypothyroidism, stable  Rhinitis/PND  -resume Flonase  -consider antihistamine if sx persist or exacerbate      PLAN:  Post Hospital D/C Medications have been reconciled  START:    0 .9% NaCl SolP 100 mL with ertapenem 1 gram SolR 500 mg  Inject 500 mg into the vein 3 (three) times a week. Ertapenem to be administered three times a week for 9 doses after HD treatment. for 9 doses- COMPLETED-      Dextromethorphan-guaiFENesin  mg  mg per 12 hr tablet  Commonly known as: MUCINEX DM-NEVER STARTED-  Take 1 tablet by mouth 2 (two) times daily. for 10 days     PredniSONE 20 MG tablet-COMPLETED-  Commonly known as: DELTASONE  Take 2 tablets (40 mg total) by mouth once daily. for 1 day    Review and recs as noted above

## 2025-06-22 PROCEDURE — G0179 MD RECERTIFICATION HHA PT: HCPCS | Mod: ,,, | Performed by: INTERNAL MEDICINE

## 2025-06-23 ENCOUNTER — TELEPHONE (OUTPATIENT)
Dept: UROLOGY | Facility: CLINIC | Age: 75
End: 2025-06-23
Payer: MEDICARE

## 2025-06-23 NOTE — TELEPHONE ENCOUNTER
Spoke with patient today states she would like to wait before coming in if her urine have blood in it again she will call to make an appointment.

## 2025-06-24 ENCOUNTER — OFFICE VISIT (OUTPATIENT)
Dept: INTERNAL MEDICINE | Facility: CLINIC | Age: 75
End: 2025-06-24
Payer: MEDICARE

## 2025-06-24 VITALS
OXYGEN SATURATION: 97 % | HEART RATE: 77 BPM | TEMPERATURE: 98 F | HEIGHT: 68 IN | WEIGHT: 220 LBS | DIASTOLIC BLOOD PRESSURE: 62 MMHG | SYSTOLIC BLOOD PRESSURE: 112 MMHG | RESPIRATION RATE: 18 BRPM | BODY MASS INDEX: 33.34 KG/M2

## 2025-06-24 DIAGNOSIS — Z12.31 ENCOUNTER FOR SCREENING MAMMOGRAM FOR BREAST CANCER: ICD-10-CM

## 2025-06-24 DIAGNOSIS — N18.6 STAGE 5 CHRONIC KIDNEY DISEASE ON CHRONIC DIALYSIS: ICD-10-CM

## 2025-06-24 DIAGNOSIS — N18.6 TYPE 2 DIABETES MELLITUS WITH CHRONIC KIDNEY DISEASE ON CHRONIC DIALYSIS, WITH LONG-TERM CURRENT USE OF INSULIN: ICD-10-CM

## 2025-06-24 DIAGNOSIS — E66.09 CLASS 1 OBESITY DUE TO EXCESS CALORIES WITH SERIOUS COMORBIDITY AND BODY MASS INDEX (BMI) OF 33.0 TO 33.9 IN ADULT: ICD-10-CM

## 2025-06-24 DIAGNOSIS — M79.7 FIBROMYALGIA: ICD-10-CM

## 2025-06-24 DIAGNOSIS — G89.4 CHRONIC PAIN SYNDROME: Chronic | ICD-10-CM

## 2025-06-24 DIAGNOSIS — N18.6 ANEMIA IN ESRD (END-STAGE RENAL DISEASE): ICD-10-CM

## 2025-06-24 DIAGNOSIS — F33.1 MAJOR DEPRESSIVE DISORDER, RECURRENT, MODERATE: ICD-10-CM

## 2025-06-24 DIAGNOSIS — Z99.2 STAGE 5 CHRONIC KIDNEY DISEASE ON CHRONIC DIALYSIS: ICD-10-CM

## 2025-06-24 DIAGNOSIS — E11.69 HYPERLIPIDEMIA ASSOCIATED WITH TYPE 2 DIABETES MELLITUS: ICD-10-CM

## 2025-06-24 DIAGNOSIS — E78.5 HYPERLIPIDEMIA ASSOCIATED WITH TYPE 2 DIABETES MELLITUS: ICD-10-CM

## 2025-06-24 DIAGNOSIS — Z09 HOSPITAL DISCHARGE FOLLOW-UP: Primary | ICD-10-CM

## 2025-06-24 DIAGNOSIS — R06.02 SOB (SHORTNESS OF BREATH): ICD-10-CM

## 2025-06-24 DIAGNOSIS — E11.22 TYPE 2 DIABETES MELLITUS WITH CHRONIC KIDNEY DISEASE ON CHRONIC DIALYSIS, WITH LONG-TERM CURRENT USE OF INSULIN: ICD-10-CM

## 2025-06-24 DIAGNOSIS — I48.91 ATRIAL FIBRILLATION, UNSPECIFIED TYPE: ICD-10-CM

## 2025-06-24 DIAGNOSIS — E03.9 ACQUIRED HYPOTHYROIDISM: ICD-10-CM

## 2025-06-24 DIAGNOSIS — E87.1 HYPONATREMIA: Chronic | ICD-10-CM

## 2025-06-24 DIAGNOSIS — R31.9 URINARY TRACT INFECTION WITH HEMATURIA, SITE UNSPECIFIED: ICD-10-CM

## 2025-06-24 DIAGNOSIS — Z93.59 CHRONIC SUPRAPUBIC CATHETER: ICD-10-CM

## 2025-06-24 DIAGNOSIS — Z17.0 MALIGNANT NEOPLASM OF LEFT BREAST IN FEMALE, ESTROGEN RECEPTOR POSITIVE, UNSPECIFIED SITE OF BREAST: Chronic | ICD-10-CM

## 2025-06-24 DIAGNOSIS — D63.1 ANEMIA IN ESRD (END-STAGE RENAL DISEASE): ICD-10-CM

## 2025-06-24 DIAGNOSIS — E66.811 CLASS 1 OBESITY DUE TO EXCESS CALORIES WITH SERIOUS COMORBIDITY AND BODY MASS INDEX (BMI) OF 33.0 TO 33.9 IN ADULT: ICD-10-CM

## 2025-06-24 DIAGNOSIS — I10 HYPERTENSION, UNSPECIFIED TYPE: ICD-10-CM

## 2025-06-24 DIAGNOSIS — Z79.4 TYPE 2 DIABETES MELLITUS WITH CHRONIC KIDNEY DISEASE ON CHRONIC DIALYSIS, WITH LONG-TERM CURRENT USE OF INSULIN: ICD-10-CM

## 2025-06-24 DIAGNOSIS — R51.9 CHRONIC DAILY HEADACHE: ICD-10-CM

## 2025-06-24 DIAGNOSIS — F51.01 PRIMARY INSOMNIA: ICD-10-CM

## 2025-06-24 DIAGNOSIS — Z79.811 PROPHYLACTIC USE OF ANASTROZOLE (ARIMIDEX): ICD-10-CM

## 2025-06-24 DIAGNOSIS — C50.912 MALIGNANT NEOPLASM OF LEFT BREAST IN FEMALE, ESTROGEN RECEPTOR POSITIVE, UNSPECIFIED SITE OF BREAST: Chronic | ICD-10-CM

## 2025-06-24 DIAGNOSIS — N39.0 URINARY TRACT INFECTION WITH HEMATURIA, SITE UNSPECIFIED: ICD-10-CM

## 2025-06-24 DIAGNOSIS — Z99.2 TYPE 2 DIABETES MELLITUS WITH CHRONIC KIDNEY DISEASE ON CHRONIC DIALYSIS, WITH LONG-TERM CURRENT USE OF INSULIN: ICD-10-CM

## 2025-06-24 PROCEDURE — 99999 PR PBB SHADOW E&M-EST. PATIENT-LVL III: CPT | Mod: PBBFAC,HCNC,, | Performed by: INTERNAL MEDICINE

## 2025-06-24 RX ORDER — TENAPANOR 21.3 MG/1
TABLET, FILM COATED ORAL
COMMUNITY

## 2025-06-24 RX ORDER — ERTAPENEM 1 G/1
INJECTION, POWDER, LYOPHILIZED, FOR SOLUTION INTRAMUSCULAR; INTRAVENOUS
COMMUNITY
Start: 2025-06-13

## 2025-06-24 RX ORDER — ATORVASTATIN CALCIUM 80 MG/1
80 TABLET, FILM COATED ORAL
COMMUNITY

## 2025-06-24 RX ORDER — SILVER SULFADIAZINE 10 G/1000G
CREAM TOPICAL
COMMUNITY

## 2025-06-26 ENCOUNTER — LAB REQUISITION (OUTPATIENT)
Dept: LAB | Facility: HOSPITAL | Age: 75
End: 2025-06-26
Payer: MEDICARE

## 2025-06-26 DIAGNOSIS — D63.1 ANEMIA IN CHRONIC KIDNEY DISEASE (CODE): ICD-10-CM

## 2025-06-26 LAB
ABSOLUTE EOSINOPHIL (OHS): 0.3 K/UL
ABSOLUTE MONOCYTE (OHS): 0.58 K/UL (ref 0.3–1)
ABSOLUTE NEUTROPHIL COUNT (OHS): 3.06 K/UL (ref 1.8–7.7)
ALBUMIN SERPL BCP-MCNC: 2.8 G/DL (ref 3.5–5.2)
ALP SERPL-CCNC: 133 UNIT/L (ref 40–150)
ALT SERPL W/O P-5'-P-CCNC: 10 UNIT/L (ref 10–44)
ANION GAP (OHS): 10 MMOL/L (ref 8–16)
AST SERPL-CCNC: 12 UNIT/L (ref 11–45)
BASOPHILS # BLD AUTO: 0.05 K/UL
BASOPHILS NFR BLD AUTO: 0.9 %
BILIRUB SERPL-MCNC: 0.3 MG/DL (ref 0.1–1)
BUN SERPL-MCNC: 18 MG/DL (ref 8–23)
CALCIUM SERPL-MCNC: 8.1 MG/DL (ref 8.7–10.5)
CHLORIDE SERPL-SCNC: 99 MMOL/L (ref 95–110)
CO2 SERPL-SCNC: 23 MMOL/L (ref 23–29)
CREAT SERPL-MCNC: 2.7 MG/DL (ref 0.5–1.4)
ERYTHROCYTE [DISTWIDTH] IN BLOOD BY AUTOMATED COUNT: 15.9 % (ref 11.5–14.5)
GFR SERPLBLD CREATININE-BSD FMLA CKD-EPI: 18 ML/MIN/1.73/M2
GLUCOSE SERPL-MCNC: 119 MG/DL (ref 70–110)
HCT VFR BLD AUTO: 34.8 % (ref 37–48.5)
HGB BLD-MCNC: 11 GM/DL (ref 12–16)
IMM GRANULOCYTES # BLD AUTO: 0.03 K/UL (ref 0–0.04)
IMM GRANULOCYTES NFR BLD AUTO: 0.6 % (ref 0–0.5)
LYMPHOCYTES # BLD AUTO: 1.4 K/UL (ref 1–4.8)
MCH RBC QN AUTO: 29.7 PG (ref 27–31)
MCHC RBC AUTO-ENTMCNC: 31.6 G/DL (ref 32–36)
MCV RBC AUTO: 94 FL (ref 82–98)
NUCLEATED RBC (/100WBC) (OHS): 0 /100 WBC
PLATELET # BLD AUTO: 254 K/UL (ref 150–450)
PMV BLD AUTO: 9.7 FL (ref 9.2–12.9)
POTASSIUM SERPL-SCNC: 4.1 MMOL/L (ref 3.5–5.1)
PROT SERPL-MCNC: 6.1 GM/DL (ref 6–8.4)
RBC # BLD AUTO: 3.7 M/UL (ref 4–5.4)
RELATIVE EOSINOPHIL (OHS): 5.5 %
RELATIVE LYMPHOCYTE (OHS): 25.8 % (ref 18–48)
RELATIVE MONOCYTE (OHS): 10.7 % (ref 4–15)
RELATIVE NEUTROPHIL (OHS): 56.5 % (ref 38–73)
SODIUM SERPL-SCNC: 132 MMOL/L (ref 136–145)
WBC # BLD AUTO: 5.42 K/UL (ref 3.9–12.7)

## 2025-06-26 PROCEDURE — 85025 COMPLETE CBC W/AUTO DIFF WBC: CPT | Mod: HCNC | Performed by: PHYSICIAN ASSISTANT

## 2025-06-26 PROCEDURE — 80053 COMPREHEN METABOLIC PANEL: CPT | Mod: HCNC | Performed by: PHYSICIAN ASSISTANT

## 2025-06-27 ENCOUNTER — RESULTS FOLLOW-UP (OUTPATIENT)
Dept: INFECTIOUS DISEASES | Facility: CLINIC | Age: 75
End: 2025-06-27

## 2025-06-27 ENCOUNTER — EXTERNAL HOME HEALTH (OUTPATIENT)
Dept: HOME HEALTH SERVICES | Facility: HOSPITAL | Age: 75
End: 2025-06-27
Payer: MEDICARE

## 2025-07-02 RX ORDER — LANOLIN ALCOHOL/MO/W.PET/CERES
CREAM (GRAM) TOPICAL
Qty: 30 TABLET | Refills: 0 | Status: SHIPPED | OUTPATIENT
Start: 2025-07-02

## 2025-07-03 ENCOUNTER — LAB REQUISITION (OUTPATIENT)
Dept: LAB | Facility: HOSPITAL | Age: 75
End: 2025-07-03
Payer: MEDICARE

## 2025-07-03 ENCOUNTER — OFFICE VISIT (OUTPATIENT)
Dept: PHYSICAL MEDICINE AND REHAB | Facility: CLINIC | Age: 75
End: 2025-07-03
Payer: MEDICARE

## 2025-07-03 VITALS — BODY MASS INDEX: 33.34 KG/M2 | HEIGHT: 68 IN | WEIGHT: 220 LBS

## 2025-07-03 DIAGNOSIS — G89.29 CHRONIC MIDLINE LOW BACK PAIN WITH RIGHT-SIDED SCIATICA: Primary | ICD-10-CM

## 2025-07-03 DIAGNOSIS — Z79.891 CHRONIC USE OF OPIATE FOR THERAPEUTIC PURPOSE: ICD-10-CM

## 2025-07-03 DIAGNOSIS — M12.811 RIGHT ROTATOR CUFF TEAR ARTHROPATHY: ICD-10-CM

## 2025-07-03 DIAGNOSIS — M47.812 SPONDYLOSIS OF CERVICAL REGION WITHOUT MYELOPATHY OR RADICULOPATHY: ICD-10-CM

## 2025-07-03 DIAGNOSIS — M54.6 CHRONIC MIDLINE THORACIC BACK PAIN: ICD-10-CM

## 2025-07-03 DIAGNOSIS — M79.7 FIBROMYALGIA: ICD-10-CM

## 2025-07-03 DIAGNOSIS — M54.2 CHRONIC NECK PAIN: ICD-10-CM

## 2025-07-03 DIAGNOSIS — G89.29 CHRONIC NECK PAIN: ICD-10-CM

## 2025-07-03 DIAGNOSIS — G56.03 BILATERAL CARPAL TUNNEL SYNDROME: ICD-10-CM

## 2025-07-03 DIAGNOSIS — D63.1 ANEMIA IN CHRONIC KIDNEY DISEASE (CODE): ICD-10-CM

## 2025-07-03 DIAGNOSIS — Z78.9 IMPAIRED MOBILITY AND ADLS: ICD-10-CM

## 2025-07-03 DIAGNOSIS — Z74.09 IMPAIRED MOBILITY AND ADLS: ICD-10-CM

## 2025-07-03 DIAGNOSIS — M47.26 OSTEOARTHRITIS OF SPINE WITH RADICULOPATHY, LUMBAR REGION: ICD-10-CM

## 2025-07-03 DIAGNOSIS — G89.29 CHRONIC MIDLINE THORACIC BACK PAIN: ICD-10-CM

## 2025-07-03 DIAGNOSIS — M75.101 RIGHT ROTATOR CUFF TEAR ARTHROPATHY: ICD-10-CM

## 2025-07-03 DIAGNOSIS — M54.41 CHRONIC MIDLINE LOW BACK PAIN WITH RIGHT-SIDED SCIATICA: Primary | ICD-10-CM

## 2025-07-03 DIAGNOSIS — R53.81 PHYSICAL DECONDITIONING: ICD-10-CM

## 2025-07-03 LAB
ABSOLUTE EOSINOPHIL (OHS): 0.65 K/UL
ABSOLUTE MONOCYTE (OHS): 0.69 K/UL (ref 0.3–1)
ABSOLUTE NEUTROPHIL COUNT (OHS): 3.42 K/UL (ref 1.8–7.7)
ALBUMIN SERPL BCP-MCNC: 2.8 G/DL (ref 3.5–5.2)
ALP SERPL-CCNC: 137 UNIT/L (ref 40–150)
ALT SERPL W/O P-5'-P-CCNC: 8 UNIT/L (ref 10–44)
ANION GAP (OHS): 10 MMOL/L (ref 8–16)
AST SERPL-CCNC: 18 UNIT/L (ref 11–45)
BASOPHILS # BLD AUTO: 0.04 K/UL
BASOPHILS NFR BLD AUTO: 0.6 %
BILIRUB SERPL-MCNC: 0.2 MG/DL (ref 0.1–1)
BUN SERPL-MCNC: 23 MG/DL (ref 8–23)
CALCIUM SERPL-MCNC: 8.1 MG/DL (ref 8.7–10.5)
CHLORIDE SERPL-SCNC: 100 MMOL/L (ref 95–110)
CO2 SERPL-SCNC: 16 MMOL/L (ref 23–29)
CREAT SERPL-MCNC: 2.8 MG/DL (ref 0.5–1.4)
ERYTHROCYTE [DISTWIDTH] IN BLOOD BY AUTOMATED COUNT: 15.7 % (ref 11.5–14.5)
GFR SERPLBLD CREATININE-BSD FMLA CKD-EPI: 17 ML/MIN/1.73/M2
GLUCOSE SERPL-MCNC: 223 MG/DL (ref 70–110)
HCT VFR BLD AUTO: 33.9 % (ref 37–48.5)
HGB BLD-MCNC: 10.7 GM/DL (ref 12–16)
IMM GRANULOCYTES # BLD AUTO: 0.02 K/UL (ref 0–0.04)
IMM GRANULOCYTES NFR BLD AUTO: 0.3 % (ref 0–0.5)
LYMPHOCYTES # BLD AUTO: 1.69 K/UL (ref 1–4.8)
MCH RBC QN AUTO: 30.3 PG (ref 27–31)
MCHC RBC AUTO-ENTMCNC: 31.6 G/DL (ref 32–36)
MCV RBC AUTO: 96 FL (ref 82–98)
NUCLEATED RBC (/100WBC) (OHS): 0 /100 WBC
PLATELET # BLD AUTO: 201 K/UL (ref 150–450)
PMV BLD AUTO: 9 FL (ref 9.2–12.9)
POTASSIUM SERPL-SCNC: 4 MMOL/L (ref 3.5–5.1)
PROT SERPL-MCNC: 6.3 GM/DL (ref 6–8.4)
RBC # BLD AUTO: 3.53 M/UL (ref 4–5.4)
RELATIVE EOSINOPHIL (OHS): 10 %
RELATIVE LYMPHOCYTE (OHS): 26 % (ref 18–48)
RELATIVE MONOCYTE (OHS): 10.6 % (ref 4–15)
RELATIVE NEUTROPHIL (OHS): 52.5 % (ref 38–73)
SODIUM SERPL-SCNC: 126 MMOL/L (ref 136–145)
WBC # BLD AUTO: 6.51 K/UL (ref 3.9–12.7)

## 2025-07-03 PROCEDURE — 1159F MED LIST DOCD IN RCRD: CPT | Mod: CPTII,HCNC,S$GLB, | Performed by: PHYSICAL MEDICINE & REHABILITATION

## 2025-07-03 PROCEDURE — 3072F LOW RISK FOR RETINOPATHY: CPT | Mod: CPTII,HCNC,S$GLB, | Performed by: PHYSICAL MEDICINE & REHABILITATION

## 2025-07-03 PROCEDURE — 82040 ASSAY OF SERUM ALBUMIN: CPT | Mod: HCNC | Performed by: PHYSICIAN ASSISTANT

## 2025-07-03 PROCEDURE — 3288F FALL RISK ASSESSMENT DOCD: CPT | Mod: CPTII,HCNC,S$GLB, | Performed by: PHYSICAL MEDICINE & REHABILITATION

## 2025-07-03 PROCEDURE — 4010F ACE/ARB THERAPY RXD/TAKEN: CPT | Mod: CPTII,HCNC,S$GLB, | Performed by: PHYSICAL MEDICINE & REHABILITATION

## 2025-07-03 PROCEDURE — 1111F DSCHRG MED/CURRENT MED MERGE: CPT | Mod: CPTII,HCNC,S$GLB, | Performed by: PHYSICAL MEDICINE & REHABILITATION

## 2025-07-03 PROCEDURE — 1101F PT FALLS ASSESS-DOCD LE1/YR: CPT | Mod: CPTII,HCNC,S$GLB, | Performed by: PHYSICAL MEDICINE & REHABILITATION

## 2025-07-03 PROCEDURE — 1125F AMNT PAIN NOTED PAIN PRSNT: CPT | Mod: CPTII,HCNC,S$GLB, | Performed by: PHYSICAL MEDICINE & REHABILITATION

## 2025-07-03 PROCEDURE — 85025 COMPLETE CBC W/AUTO DIFF WBC: CPT | Mod: HCNC | Performed by: PHYSICIAN ASSISTANT

## 2025-07-03 PROCEDURE — 99999 PR PBB SHADOW E&M-EST. PATIENT-LVL II: CPT | Mod: PBBFAC,HCNC,, | Performed by: PHYSICAL MEDICINE & REHABILITATION

## 2025-07-03 PROCEDURE — 3066F NEPHROPATHY DOC TX: CPT | Mod: CPTII,HCNC,S$GLB, | Performed by: PHYSICAL MEDICINE & REHABILITATION

## 2025-07-03 PROCEDURE — 3044F HG A1C LEVEL LT 7.0%: CPT | Mod: CPTII,HCNC,S$GLB, | Performed by: PHYSICAL MEDICINE & REHABILITATION

## 2025-07-03 PROCEDURE — 99214 OFFICE O/P EST MOD 30 MIN: CPT | Mod: HCNC,S$GLB,, | Performed by: PHYSICAL MEDICINE & REHABILITATION

## 2025-07-03 PROCEDURE — 3008F BODY MASS INDEX DOCD: CPT | Mod: CPTII,HCNC,S$GLB, | Performed by: PHYSICAL MEDICINE & REHABILITATION

## 2025-07-03 RX ORDER — OXYCODONE HYDROCHLORIDE 10 MG/1
10 TABLET ORAL DAILY PRN
Qty: 30 TABLET | Refills: 0 | Status: SHIPPED | OUTPATIENT
Start: 2025-07-07

## 2025-07-03 RX ORDER — METHOCARBAMOL 750 MG/1
750-1500 TABLET, FILM COATED ORAL 3 TIMES DAILY PRN
Qty: 180 TABLET | Refills: 3 | Status: SHIPPED | OUTPATIENT
Start: 2025-07-03

## 2025-07-03 RX ORDER — HYDROCODONE BITARTRATE AND ACETAMINOPHEN 10; 325 MG/1; MG/1
1 TABLET ORAL EVERY 6 HOURS PRN
Qty: 120 TABLET | Refills: 0 | Status: SHIPPED | OUTPATIENT
Start: 2025-07-07 | End: 2025-08-06

## 2025-07-03 NOTE — PROGRESS NOTES
Subjective:       Patient ID: Cecile Bowen is a 74 y.o. female.      Chief Complaint: Back Pain and Knee Pain    HPI     Ms. Bowen is a 74-year-old white female with past medical history of HTN, DM2, ESRD on HD (since 8/2023), chronic anticoagulation with Eliquis (since 8/2023), hypothyroidism, fibromyalgia, status post left mastectomy in 08/2019, severe obesity and OA.  She also has history of hospitalization for blockage of the urethra with hydronephrosis and multiple medical complications in 06/2021, followed by intensive rehabilitation program but persistent impairment of her activities of daily living and mobility. She is followed up in  the [facility] for chronic low back pain due to DJD, status post multiple procedures including RFAs and ESIs, chronic thoracic pain, chronic neck pain, fibromyalgia and bilateral carpal tunnel syndrome.  Her last visit was on 11/14/2023. She was maintained on duloxetine, p.r.n. Hydrocodone/APAP, p.r.n. Methocarbamol and diclofenac gel.      The patient was hospitalized from 06/09/2025 to 06/12/2025 for fluid overload and UTI.  Was discharged home.      T the patient was lost to follow-up.  She is coming today to the clinic to reestablish care for her pain.  Her fibromyalgia symptoms including generalized muscle and joint aching, stiffness, and fatigue have been under good control.    Her back pain has been slightly worse.  It is a constant throbbing, stabbing and stiffness in the lumbar spine and across her back.  She has occasional radiation to to the right foot. It is worse with activity. Her maximum pain is 9/10 and minimum 6/10.  Today, it is 7/10.  Her bilateral lower extremity weakness has been worse.  She has adult diapers.  She is status post suprapubic catheter due to neurogenic bladder.    Her neck pain has been better.  It is an intermittent aching pain in the cervical spine.  She denies any radiation to her arms.  It is worse with neck movement,  especially rotation. Her maximum pain is 7/10 and minimum 5/10.  Today, it is 5/10.  The patient denies any upper extremity weakness.  She has occasional hand numbness.        The patient continues to require assistance for her activities of daily living including toileting and bathing.  She uses a manual Ray lift for transfers.  Her insurance did Sdid not cover an electric Ray lift.  She has a suprapubic catheter.  She wears adult diapers.  She is getting home health therapy.    She is currently taking:  - duloxetine 20 mg capsules, 2 capsules once daily  - hydrocodone/APAP 10/325 p.r.n. four times per day  - methocarbamol 750 mg p.o. p.r.n., usually 2 tablets 3 times per day  - Voltaren gel topically p.r.n. for painful joints.      Past Medical History:   Diagnosis Date    Bacteremia due to Enterococcus 09/28/2021    Catheter-associated urinary tract infection 09/29/2021    Cervicogenic migraine     Chronic pain     CKD (chronic kidney disease) stage 4, GFR 15-29 ml/min     [facility]    CKD (chronic kidney disease) stage 4, GFR 15-29 ml/min     Diabetes mellitus     Long term use of Insulin, Diabetic Neuropathy    Dialysis patient 01/21/2022    Fibromyalgia     Hydronephrosis     Hyperlipidemia     Hypertension 12/12/2012    Hypothyroidism 12/12/2012    ARON (iron deficiency anemia)     Insomnia     Levoscoliosis     Malignant neoplasm of upper-outer quadrant of left breast in female, estrogen receptor positive     Metabolic acidosis     Mobility impaired     Nuclear sclerosis - Both Eyes 03/24/2014    VIJAYA (obstructive sleep apnea)     Osteopenia     Pulmonary nodule     Recurrent UTI     Renal manifestation of secondary diabetes mellitus     Secondary hyperparathyroidism, renal     Urinary retention     Urinary tract infection due to ESBL Klebsiella          Review of Systems   Constitutional:  Positive for fatigue. Negative for chills and fever.   Eyes:  Negative for visual disturbance.   Respiratory:   Negative for shortness of breath.    Cardiovascular:  Negative for chest pain.   Gastrointestinal:  Positive for constipation. Negative for nausea and vomiting.   Genitourinary:         S/p suprapubic catheter   Musculoskeletal:  Positive for arthralgias, back pain, gait problem and neck pain. Negative for joint swelling.   Neurological:  Positive for weakness and headaches. Negative for dizziness.   Psychiatric/Behavioral:  Positive for sleep disturbance. Negative for behavioral problems.        Objective:      Physical Exam  Vitals reviewed.   Constitutional:       Appearance: She is well-developed.      Comments: Coming to the clinic in a manual wheelchair   HENT:      Head: Normocephalic and atraumatic.   Eyes:      Extraocular Movements: Extraocular movements intact.   Musculoskeletal:      Cervical back: No tenderness.      Comments: BUE:  ROM:   RUE: decreased shoulder abduction (90 deg active)   LUE: full.  Strength:    RUE: 3/5 at shoulder abduction, 5- elbow flexion, 5- elbow extension, 5- hand .   LUE: 4/5 at shoulder abduction, 5- elbow flexion, 5- elbow extension, 5- hand .  Sensation to pinprick:   RUE: intact.   LUE: intact.  DTR:    RUE: +1 biceps, +1 triceps.   LUE:  +1 biceps, +1 triceps.    BLE:  ROM:   RLE: increased tone.   LLE: increased tone.   BLE edema, R>L.  Strength:    RLE: 4-/5 at hip flexion, 4 knee extension, 4 ankle DF, 4 PF.   LLE: 4-/5 at hip flexion, 4+ knee extension, 4+ ankle DF, 4+ PF.  Sensation to pinprick:     RLE: intact.      LLE: intact.   DTR:     RLE: +1 knee, +1 ankle.    LLE: +1 knee, +1 ankle.  SLR (sitting):      RLE: -ve.      LLE: -ve.       Skin:     General: Skin is warm.   Neurological:      General: No focal deficit present.      Mental Status: She is alert.   Psychiatric:         Behavior: Behavior normal.             Assessment:       1. Chronic midline low back pain with right-sided sciatica    2. Osteoarthritis of spine with radiculopathy, lumbar  region    3. Chronic neck pain    4. Spondylosis of cervical region without myelopathy or radiculopathy    5. Chronic midline thoracic back pain    6. Fibromyalgia    7. Bilateral carpal tunnel syndrome    8. Right rotator cuff tear arthropathy    9. Impaired mobility and ADLs    10. Physical deconditioning    11. Chronic use of opiate for therapeutic purpose        Plan:       - NSAIDs will be avoided due to ESRD and anticoagulation with Eliquis.  - Continue DULoxetine 20 mg CpDR; Take 2 capsules (40 mg by mouth once) daily.  - Coontinue HYDROcodone-acetaminophen (NORCO)  mg per tablet; Take 1 tablet by mouth every 6 (six) hours as needed for moderate pain.  - Continue oxyCODONE (ROXICODONE) 10 mg Tab immediate release tablet; Take 1 tablet (10 mg total) by mouth daily as needed (Severe pain).  - Continue methocarbamoL (ROBAXIN) 750 MG Tab; Take 1-2 tablets (750-1,500 mg total) by mouth 3 (three) times daily as needed (muscle spasms).  - Her radiculopathy is mild.  I will hold off on adding gabapentin to her medications at this time.  - Continue Home Health Physical therapy, followed by a regular home exercise program.  - Follow up in about 4 months (around 11/3/2025).         This was a 30 minute visit (including review of records), 50% of which was spent educating the patient about the diagnosis and the treatment plan.      This note was partly generated with [employer] voice recognition software. I apologize for any possible typographical errors.

## 2025-07-07 ENCOUNTER — TELEPHONE (OUTPATIENT)
Dept: HOME HEALTH SERVICES | Facility: CLINIC | Age: 75
End: 2025-07-07
Payer: MEDICARE

## 2025-07-08 ENCOUNTER — OFFICE VISIT (OUTPATIENT)
Dept: HOME HEALTH SERVICES | Facility: CLINIC | Age: 75
End: 2025-07-08
Payer: MEDICARE

## 2025-07-08 VITALS
OXYGEN SATURATION: 96 % | WEIGHT: 220 LBS | RESPIRATION RATE: 16 BRPM | TEMPERATURE: 97 F | DIASTOLIC BLOOD PRESSURE: 70 MMHG | BODY MASS INDEX: 33.34 KG/M2 | HEART RATE: 77 BPM | HEIGHT: 68 IN | SYSTOLIC BLOOD PRESSURE: 132 MMHG

## 2025-07-08 DIAGNOSIS — N18.6 ANEMIA IN ESRD (END-STAGE RENAL DISEASE): ICD-10-CM

## 2025-07-08 DIAGNOSIS — Z00.00 ENCOUNTER FOR MEDICARE ANNUAL WELLNESS EXAM: Primary | ICD-10-CM

## 2025-07-08 DIAGNOSIS — E11.59 HYPERTENSION ASSOCIATED WITH DIABETES: ICD-10-CM

## 2025-07-08 DIAGNOSIS — G47.00 INSOMNIA, UNSPECIFIED TYPE: ICD-10-CM

## 2025-07-08 DIAGNOSIS — G47.33 OSA (OBSTRUCTIVE SLEEP APNEA): Chronic | ICD-10-CM

## 2025-07-08 DIAGNOSIS — F33.0 MDD (MAJOR DEPRESSIVE DISORDER), RECURRENT EPISODE, MILD: ICD-10-CM

## 2025-07-08 DIAGNOSIS — Z93.59 CHRONIC SUPRAPUBIC CATHETER: ICD-10-CM

## 2025-07-08 DIAGNOSIS — N25.81 SECONDARY HYPERPARATHYROIDISM, RENAL: ICD-10-CM

## 2025-07-08 DIAGNOSIS — E03.9 ACQUIRED HYPOTHYROIDISM: Chronic | ICD-10-CM

## 2025-07-08 DIAGNOSIS — E11.22 TYPE 2 DIABETES MELLITUS WITH CHRONIC KIDNEY DISEASE ON CHRONIC DIALYSIS, WITH LONG-TERM CURRENT USE OF INSULIN: ICD-10-CM

## 2025-07-08 DIAGNOSIS — Z99.2 TYPE 2 DIABETES MELLITUS WITH CHRONIC KIDNEY DISEASE ON CHRONIC DIALYSIS, WITH LONG-TERM CURRENT USE OF INSULIN: ICD-10-CM

## 2025-07-08 DIAGNOSIS — I15.2 HYPERTENSION ASSOCIATED WITH DIABETES: ICD-10-CM

## 2025-07-08 DIAGNOSIS — D63.1 ANEMIA IN ESRD (END-STAGE RENAL DISEASE): ICD-10-CM

## 2025-07-08 DIAGNOSIS — M15.0 PRIMARY OSTEOARTHRITIS INVOLVING MULTIPLE JOINTS: ICD-10-CM

## 2025-07-08 DIAGNOSIS — F11.20 UNCOMPLICATED OPIOID DEPENDENCE: ICD-10-CM

## 2025-07-08 DIAGNOSIS — R26.9 ABNORMALITY OF GAIT AND MOBILITY: ICD-10-CM

## 2025-07-08 DIAGNOSIS — N18.6 ESRD (END STAGE RENAL DISEASE) ON DIALYSIS: ICD-10-CM

## 2025-07-08 DIAGNOSIS — I70.0 AORTIC ATHEROSCLEROSIS: ICD-10-CM

## 2025-07-08 DIAGNOSIS — I15.2 OBESITY, DIABETES, AND HYPERTENSION SYNDROME: ICD-10-CM

## 2025-07-08 DIAGNOSIS — E66.09 CLASS 1 OBESITY DUE TO EXCESS CALORIES WITH SERIOUS COMORBIDITY AND BODY MASS INDEX (BMI) OF 33.0 TO 33.9 IN ADULT: ICD-10-CM

## 2025-07-08 DIAGNOSIS — L89.313 PRESSURE INJURY OF RIGHT BUTTOCK, STAGE 3: ICD-10-CM

## 2025-07-08 DIAGNOSIS — D64.3 SIDEROBLASTIC ANEMIA: ICD-10-CM

## 2025-07-08 DIAGNOSIS — Z99.2 ESRD (END STAGE RENAL DISEASE) ON DIALYSIS: ICD-10-CM

## 2025-07-08 DIAGNOSIS — Z99.3 DEPENDENCE ON WHEELCHAIR: ICD-10-CM

## 2025-07-08 DIAGNOSIS — K76.0 FATTY LIVER DISEASE, NONALCOHOLIC: ICD-10-CM

## 2025-07-08 DIAGNOSIS — G89.29 OTHER CHRONIC PAIN: Chronic | ICD-10-CM

## 2025-07-08 DIAGNOSIS — M79.7 FIBROMYALGIA: Chronic | ICD-10-CM

## 2025-07-08 DIAGNOSIS — E66.9 OBESITY, DIABETES, AND HYPERTENSION SYNDROME: ICD-10-CM

## 2025-07-08 DIAGNOSIS — Z78.0 POST-MENOPAUSE: ICD-10-CM

## 2025-07-08 DIAGNOSIS — Z79.811 PROPHYLACTIC USE OF ANASTROZOLE (ARIMIDEX): ICD-10-CM

## 2025-07-08 DIAGNOSIS — E11.59 OBESITY, DIABETES, AND HYPERTENSION SYNDROME: ICD-10-CM

## 2025-07-08 DIAGNOSIS — E66.811 CLASS 1 OBESITY DUE TO EXCESS CALORIES WITH SERIOUS COMORBIDITY AND BODY MASS INDEX (BMI) OF 33.0 TO 33.9 IN ADULT: ICD-10-CM

## 2025-07-08 DIAGNOSIS — N18.6 TYPE 2 DIABETES MELLITUS WITH CHRONIC KIDNEY DISEASE ON CHRONIC DIALYSIS, WITH LONG-TERM CURRENT USE OF INSULIN: ICD-10-CM

## 2025-07-08 DIAGNOSIS — E11.69 OBESITY, DIABETES, AND HYPERTENSION SYNDROME: ICD-10-CM

## 2025-07-08 DIAGNOSIS — E55.9 VITAMIN D DEFICIENCY DISEASE: ICD-10-CM

## 2025-07-08 DIAGNOSIS — I48.91 ATRIAL FIBRILLATION, UNSPECIFIED TYPE: ICD-10-CM

## 2025-07-08 DIAGNOSIS — M85.80 OSTEOPENIA, UNSPECIFIED LOCATION: ICD-10-CM

## 2025-07-08 DIAGNOSIS — E11.69 HYPERLIPIDEMIA ASSOCIATED WITH TYPE 2 DIABETES MELLITUS: ICD-10-CM

## 2025-07-08 DIAGNOSIS — E78.5 HYPERLIPIDEMIA ASSOCIATED WITH TYPE 2 DIABETES MELLITUS: ICD-10-CM

## 2025-07-08 DIAGNOSIS — Z79.4 TYPE 2 DIABETES MELLITUS WITH CHRONIC KIDNEY DISEASE ON CHRONIC DIALYSIS, WITH LONG-TERM CURRENT USE OF INSULIN: ICD-10-CM

## 2025-07-08 DIAGNOSIS — E11.42 DIABETIC POLYNEUROPATHY ASSOCIATED WITH TYPE 2 DIABETES MELLITUS: ICD-10-CM

## 2025-07-08 DIAGNOSIS — J44.9 CHRONIC OBSTRUCTIVE PULMONARY DISEASE, UNSPECIFIED COPD TYPE: ICD-10-CM

## 2025-07-08 PROCEDURE — 1160F RVW MEDS BY RX/DR IN RCRD: CPT | Mod: CPTII,S$GLB,,

## 2025-07-08 PROCEDURE — 1158F ADVNC CARE PLAN TLK DOCD: CPT | Mod: CPTII,S$GLB,,

## 2025-07-08 PROCEDURE — 3066F NEPHROPATHY DOC TX: CPT | Mod: CPTII,S$GLB,,

## 2025-07-08 PROCEDURE — 3288F FALL RISK ASSESSMENT DOCD: CPT | Mod: CPTII,S$GLB,,

## 2025-07-08 PROCEDURE — 3078F DIAST BP <80 MM HG: CPT | Mod: CPTII,S$GLB,,

## 2025-07-08 PROCEDURE — 3072F LOW RISK FOR RETINOPATHY: CPT | Mod: CPTII,S$GLB,,

## 2025-07-08 PROCEDURE — 1125F AMNT PAIN NOTED PAIN PRSNT: CPT | Mod: CPTII,S$GLB,,

## 2025-07-08 PROCEDURE — 1101F PT FALLS ASSESS-DOCD LE1/YR: CPT | Mod: CPTII,S$GLB,,

## 2025-07-08 PROCEDURE — 4010F ACE/ARB THERAPY RXD/TAKEN: CPT | Mod: CPTII,S$GLB,,

## 2025-07-08 PROCEDURE — 1159F MED LIST DOCD IN RCRD: CPT | Mod: CPTII,S$GLB,,

## 2025-07-08 PROCEDURE — 3075F SYST BP GE 130 - 139MM HG: CPT | Mod: CPTII,S$GLB,,

## 2025-07-08 PROCEDURE — 1170F FXNL STATUS ASSESSED: CPT | Mod: CPTII,S$GLB,,

## 2025-07-08 PROCEDURE — 3044F HG A1C LEVEL LT 7.0%: CPT | Mod: CPTII,S$GLB,,

## 2025-07-08 PROCEDURE — G0439 PPPS, SUBSEQ VISIT: HCPCS | Mod: S$GLB,,,

## 2025-07-08 NOTE — PATIENT INSTRUCTIONS
Counseling and Referral of Other Preventative  (Italic type indicates deductible and co-insurance are waived)    Patient Name: Cecile Bowen  Today's Date: 7/8/2025    Health Maintenance         Date Due Completion Date    TETANUS VACCINE Never done ---    RSV Vaccine (Age 60+ and Pregnant patients) (1 - Risk 60-74 years 1-dose series) Never done ---    Shingles Vaccine (1 of 2) 02/10/2014 12/16/2013    DEXA Scan 10/29/2021 10/29/2019    Mammogram 03/08/2024 3/8/2023    COVID-19 Vaccine (6 - 2024-25 season) 09/01/2024 12/24/2021    Lipid Panel 04/22/2025 4/22/2024    Foot Exam 08/13/2025 8/13/2024    Override on 1/30/2023: Done    Override on 10/11/2019: Done    Override on 9/27/2017: Done    Override on 10/6/2016: Done    Override on 9/19/2016: Done    Influenza Vaccine (1) 09/01/2025 9/27/2024    Hemoglobin A1c 10/16/2025 4/16/2025    Diabetic Eye Exam 11/19/2025 11/19/2024    Colorectal Cancer Screening 12/07/2028 12/7/2023          Orders Placed This Encounter   Procedures    DXA Bone Density Axial Skeleton 1 or more sites    Lipid Panel     The following information is provided to all patients.  This information is to help you find resources for any of the problems found today that may be affecting your health:                  Living healthy guide: www.[facility].[place].gov      Understanding Diabetes: www.diabetes.org      Eating healthy: www.cdc.gov/healthyweight      CDC home safety checklist: www.cdc.gov/steadi/patient.html      Agency on Aging: www.goea.[place].gov      Alcoholics anonymous (AA): www.aa.org      Physical Activity: www.zenon.nih.gov/gj3omyi      Tobacco use: www.quitwithusla.org

## 2025-07-08 NOTE — PROGRESS NOTES
"Cecile Bowen presented for a follow-up Medicare AWV today. The following components were reviewed and updated:    Medical history  Family History  Social history  Allergies and Current Medications  Health Risk Assessment  Health Maintenance  Care Team    **See Completed Assessments for Annual Wellness visit with in the encounter summary    The following assessments were completed:  Depression Screening  Cognitive function Screening    Timed Get Up Test: Deferred, impaired mobility  Whisper Test      Opioid documentation:      Patient does have a current opioid prescription.      Patient accepted further discussion regarding opioid medication use.      Patient is currently taking hydrocodone and oxycodone narcotic for back pain.        Pain level today is 6/10.       In addition to narcotic pain medications, patient is also using NSAIDs for pain control.       Patient is followed by a specialist currently for their pain and will not be referred today.       Patient's opioid risk potential based on ORT-OUD tool:       Isaak each box that applies   No   Yes     Family history of substance abuse   Alcohol [x] []   Illegal drugs [x] []   Rx drugs [x] []     Personal history of substance abuse   Alcohol [x] []   Illegal drugs [x] []   Rx drugs [x] []     Age between 16-45 years   [x]   []     Patient with ADD, OCD, Bipolar disorder, schizoprenia   [x]   []     Patient with depression   []   [x]                         Scoring total                                                        1         Non-opioid treatment options have been discussed today and added to the patient's after visit summary.          Vitals:    07/08/25 0836   BP: 132/70   BP Location: Right arm   Patient Position: Sitting   Pulse: 77   Resp: 16   Temp: 97 °F (36.1 °C)   SpO2: 96%   Weight: 99.8 kg (220 lb 0.3 oz)   Height: 5' 8" (1.727 m)     Body mass index is 33.45 kg/m².       Physical Exam  Vitals reviewed.   Constitutional:       General: She " is not in acute distress.     Appearance: Normal appearance. She is obese.   HENT:      Head: Normocephalic.   Cardiovascular:      Rate and Rhythm: Normal rate and regular rhythm.      Pulses: Normal pulses.      Heart sounds: Normal heart sounds.   Pulmonary:      Effort: Pulmonary effort is normal. No respiratory distress.      Breath sounds: Normal breath sounds. No wheezing.   Abdominal:      General: Bowel sounds are normal.      Tenderness: There is no abdominal tenderness.   Musculoskeletal:         General: Normal range of motion.      Cervical back: Normal range of motion.      Right lower leg: No edema.      Left lower leg: No edema.   Skin:     General: Skin is warm and dry.      Capillary Refill: Capillary refill takes less than 2 seconds.      Findings: Bruising (over dialysis catheter) present.   Neurological:      General: No focal deficit present.      Mental Status: She is alert and oriented to person, place, and time.   Psychiatric:         Mood and Affect: Mood normal.         Behavior: Behavior normal.           Diagnoses and health risks identified today and associated recommendations/orders:    1. Encounter for Medicare annual wellness exam  Assessments completed.  HM recommendations reviewed.  F/u with PCP as instructed.      - Ambulatory Referral/Consult to Enhanced Annual Wellness Visit (eAWV)    2. Chronic suprapubic catheter  Chronic, stable on current regimen. Followed by Urology.     3. Pressure injury of right buttock, stage 3  Chronic, stable on current regimen. Followed by PCP.     4. ESRD (end stage renal disease) on dialysis  Chronic, stable on current regimen. Followed by Dialysis.   Lab Results   Component Value Date    CREATININE 2.8 (H) 07/03/2025    BUN 23 07/03/2025     (L) 07/03/2025    K 4.0 07/03/2025     07/03/2025    CO2 16 (L) 07/03/2025     5. Chronic obstructive pulmonary disease, unspecified COPD type  Chronic, stable on current albuterol regimen.  Followed by PCP.     6. Atrial fibrillation, unspecified type  Chronic, stable on current Eliquis regimen. Followed by Cardiology.     7. Uncomplicated opioid dependence  Chronic, stable on current Hydrocodone, Oxycodone, Robaxin regimen. Followed by Pain Medicine.     8. Type 2 diabetes mellitus with chronic kidney disease on chronic dialysis, with long-term current use of insulin  Chronic, stable on current Lantus regimen. Followed by PCP.   Lab Results   Component Value Date    HGBA1C 5.4 04/16/2025     9. Hypertension associated with diabetes  Chronic, stable on current Amlodipine, Losartan regimen. Followed by PCP.     10. Hyperlipidemia associated with type 2 diabetes mellitus  Chronic, stable on current statin regimen. Followed by PCP.   - Lipid Panel; Future    11. Diabetic polyneuropathy associated with type 2 diabetes mellitus  Chronic, stable on current Cymbalta regimen. Followed by Pain Medicine.     12. Obesity, diabetes, and hypertension syndrome  Chronic, stable on current regimen. The patient is asked to make an attempt to improve diet and exercise patterns to aid in medical management of this problem. Followed by PCP.     13. Secondary hyperparathyroidism, renal  Chronic, stable on current regimen. Followed by Dialysis.   Lab Results   Component Value Date     (H) 07/02/2025    CALCIUM 8.1 (L) 07/03/2025    CAION 1.10 12/13/2021    PHOS 6.0 (H) 07/02/2025     14. Aortic atherosclerosis  Chronic, stable on current statin regimen. Followed by PCP.     15. Prophylactic use of anastrozole (Arimidex)  Chronic, stable on current Arimidex regimen. Followed by Hem/Onc.     16. Anemia in ESRD (end-stage renal disease)  Chronic, stable on current regimen. Followed by Dialysis.   Lab Results   Component Value Date    WBC 6.51 07/03/2025    HGB 10.7 (L) 07/03/2025    HCT 33.9 (L) 07/03/2025    MCV 96 07/03/2025     07/03/2025     17. Sideroblastic anemia  Chronic, stable on current regimen.  Followed by PCP.   Lab Results   Component Value Date    WBC 6.51 07/03/2025    HGB 10.7 (L) 07/03/2025    HCT 33.9 (L) 07/03/2025    MCV 96 07/03/2025     07/03/2025     18. Acquired hypothyroidism  Chronic, stable on current Synthroid regimen. Followed by PCP.   Lab Results   Component Value Date    TSH 1.631 12/31/2024     19. Vitamin D deficiency disease  Chronic, stable on current Vit D regimen. Followed by PCP.     20. Class 1 obesity due to excess calories with serious comorbidity and body mass index (BMI) of 33.0 to 33.9 in adult  Chronic, stable on current regimen. The patient is asked to make an attempt to improve diet and exercise patterns to aid in medical management of this problem. Followed by PCP.     21. Fatty liver disease, nonalcoholic  Chronic, stable on current regimen. Followed by PCP.   Lab Results   Component Value Date    ALT 8 (L) 07/03/2025    AST 18 07/03/2025    ALKPHOS 137 07/03/2025    BILITOT 0.2 07/03/2025     22. Osteopenia, unspecified location  Chronic, stable on current regimen. Followed by PCP.     - DXA Bone Density Axial Skeleton 1 or more sites; Future    23. Post-menopause  Chronic, stable on current regimen. Followed by PCP.     - DXA Bone Density Axial Skeleton 1 or more sites; Future    24. VIJAYA (obstructive sleep apnea)  Chronic, stable on current regimen. Followed by PCP.     25. Insomnia, unspecified type  Chronic, stable on current Trazodone regimen. Followed by PCP.     26. MDD (major depressive disorder), recurrent episode, mild  Chronic, stable on current regimen. Followed by PCP.     27. Primary osteoarthritis involving multiple joints  Chronic, stable on current Hydrocodone, Oxycodone, Robaxin regimen. Followed by Pain Medicine.     28. Fibromyalgia  Chronic, stable on current Hydrocodone, Oxycodone, Robaxin regimen. Followed by Pain Medicine.     29. Other chronic pain  Chronic, stable on current Hydrocodone, Oxycodone, Robaxin regimen. Followed by Pain  Medicine.     30. Abnormality of gait and mobility  Unable to safely ambulate without assistance.     31. Dependence on wheelchair  Uses wheeled walker.       Provided Cecile with a 5-10 year written screening schedule and personal prevention plan. Recommendations were developed using the USPSTF age appropriate recommendations. Education, counseling, and referrals were provided as needed.  After Visit Summary printed and given to patient which includes a list of additional screenings\tests needed.    Follow up in about 1 year (around 7/8/2026) for Medicare AWV.      Emmanuelel Paul NP    I offered to discuss advanced care planning, including how to pick a person who would make decisions for you if you were unable to make them for yourself, called a health care power of , and what kind of decisions you might make such as use of life sustaining treatments such as ventilators and tube feeding when faced with a life limiting illness recorded on a living will that they will need to know. (How you want to be cared for as you near the end of your natural life)     X Patient is interested in learning more about how to make advanced directives.  I provided them paperwork and offered to discuss this with them.

## 2025-07-10 DIAGNOSIS — C50.612 MALIGNANT NEOPLASM OF AXILLARY TAIL OF LEFT BREAST IN FEMALE, ESTROGEN RECEPTOR POSITIVE: Chronic | ICD-10-CM

## 2025-07-10 DIAGNOSIS — Z17.0 MALIGNANT NEOPLASM OF AXILLARY TAIL OF LEFT BREAST IN FEMALE, ESTROGEN RECEPTOR POSITIVE: Chronic | ICD-10-CM

## 2025-07-11 RX ORDER — ANASTROZOLE 1 MG/1
1 TABLET ORAL
Qty: 90 TABLET | Refills: 2 | Status: SHIPPED | OUTPATIENT
Start: 2025-07-11

## 2025-07-12 ENCOUNTER — LAB REQUISITION (OUTPATIENT)
Dept: LAB | Facility: HOSPITAL | Age: 75
End: 2025-07-12
Payer: MEDICARE

## 2025-07-12 DIAGNOSIS — N18.6 END STAGE RENAL DISEASE: ICD-10-CM

## 2025-07-12 LAB
ABSOLUTE EOSINOPHIL (OHS): 0.59 K/UL
ABSOLUTE MONOCYTE (OHS): 0.73 K/UL (ref 0.3–1)
ABSOLUTE NEUTROPHIL COUNT (OHS): 3.3 K/UL (ref 1.8–7.7)
ALBUMIN SERPL BCP-MCNC: 2.7 G/DL (ref 3.5–5.2)
ALP SERPL-CCNC: 133 UNIT/L (ref 40–150)
ALT SERPL W/O P-5'-P-CCNC: 8 UNIT/L (ref 10–44)
ANION GAP (OHS): 9 MMOL/L (ref 8–16)
AST SERPL-CCNC: 11 UNIT/L (ref 11–45)
BASOPHILS # BLD AUTO: 0.05 K/UL
BASOPHILS NFR BLD AUTO: 0.8 %
BILIRUB SERPL-MCNC: 0.2 MG/DL (ref 0.1–1)
BUN SERPL-MCNC: 19 MG/DL (ref 8–23)
CALCIUM SERPL-MCNC: 7.7 MG/DL (ref 8.7–10.5)
CHLORIDE SERPL-SCNC: 101 MMOL/L (ref 95–110)
CO2 SERPL-SCNC: 22 MMOL/L (ref 23–29)
CREAT SERPL-MCNC: 2.4 MG/DL (ref 0.5–1.4)
ERYTHROCYTE [DISTWIDTH] IN BLOOD BY AUTOMATED COUNT: 15.3 % (ref 11.5–14.5)
GFR SERPLBLD CREATININE-BSD FMLA CKD-EPI: 21 ML/MIN/1.73/M2
GLUCOSE SERPL-MCNC: 228 MG/DL (ref 70–110)
HCT VFR BLD AUTO: 32.6 % (ref 37–48.5)
HGB BLD-MCNC: 10.4 GM/DL (ref 12–16)
IMM GRANULOCYTES # BLD AUTO: 0.02 K/UL (ref 0–0.04)
IMM GRANULOCYTES NFR BLD AUTO: 0.3 % (ref 0–0.5)
LYMPHOCYTES # BLD AUTO: 1.78 K/UL (ref 1–4.8)
MCH RBC QN AUTO: 30.4 PG (ref 27–31)
MCHC RBC AUTO-ENTMCNC: 31.9 G/DL (ref 32–36)
MCV RBC AUTO: 95 FL (ref 82–98)
NUCLEATED RBC (/100WBC) (OHS): 0 /100 WBC
PLATELET # BLD AUTO: 229 K/UL (ref 150–450)
PMV BLD AUTO: 9 FL (ref 9.2–12.9)
POTASSIUM SERPL-SCNC: 3.3 MMOL/L (ref 3.5–5.1)
PROT SERPL-MCNC: 5.5 GM/DL (ref 6–8.4)
RBC # BLD AUTO: 3.42 M/UL (ref 4–5.4)
RELATIVE EOSINOPHIL (OHS): 9.1 %
RELATIVE LYMPHOCYTE (OHS): 27.5 % (ref 18–48)
RELATIVE MONOCYTE (OHS): 11.3 % (ref 4–15)
RELATIVE NEUTROPHIL (OHS): 51 % (ref 38–73)
SODIUM SERPL-SCNC: 132 MMOL/L (ref 136–145)
WBC # BLD AUTO: 6.47 K/UL (ref 3.9–12.7)

## 2025-07-12 PROCEDURE — 85025 COMPLETE CBC W/AUTO DIFF WBC: CPT | Mod: HCNC | Performed by: INTERNAL MEDICINE

## 2025-07-12 PROCEDURE — 80053 COMPREHEN METABOLIC PANEL: CPT | Mod: HCNC | Performed by: INTERNAL MEDICINE

## 2025-07-15 ENCOUNTER — HOSPITAL ENCOUNTER (OUTPATIENT)
Dept: PULMONOLOGY | Facility: CLINIC | Age: 75
Discharge: HOME | End: 2025-07-15
Payer: MEDICARE

## 2025-07-15 ENCOUNTER — OFFICE VISIT (OUTPATIENT)
Dept: PULMONOLOGY | Facility: CLINIC | Age: 75
End: 2025-07-15
Payer: MEDICARE

## 2025-07-15 VITALS
DIASTOLIC BLOOD PRESSURE: 62 MMHG | OXYGEN SATURATION: 94 % | BODY MASS INDEX: 33.34 KG/M2 | HEART RATE: 80 BPM | WEIGHT: 220 LBS | HEIGHT: 68 IN | SYSTOLIC BLOOD PRESSURE: 130 MMHG

## 2025-07-15 DIAGNOSIS — R91.1 PULMONARY NODULE: ICD-10-CM

## 2025-07-15 DIAGNOSIS — J44.9 CHRONIC OBSTRUCTIVE PULMONARY DISEASE, UNSPECIFIED COPD TYPE: ICD-10-CM

## 2025-07-15 DIAGNOSIS — R06.02 SHORTNESS OF BREATH: Primary | ICD-10-CM

## 2025-07-15 DIAGNOSIS — G47.33 OSA (OBSTRUCTIVE SLEEP APNEA): Chronic | ICD-10-CM

## 2025-07-15 PROCEDURE — 3072F LOW RISK FOR RETINOPATHY: CPT | Mod: CPTII,HCNC,S$GLB, | Performed by: STUDENT IN AN ORGANIZED HEALTH CARE EDUCATION/TRAINING PROGRAM

## 2025-07-15 PROCEDURE — 99999 PR PBB SHADOW E&M-EST. PATIENT-LVL V: CPT | Mod: PBBFAC,HCNC,, | Performed by: STUDENT IN AN ORGANIZED HEALTH CARE EDUCATION/TRAINING PROGRAM

## 2025-07-15 PROCEDURE — 3078F DIAST BP <80 MM HG: CPT | Mod: CPTII,HCNC,S$GLB, | Performed by: STUDENT IN AN ORGANIZED HEALTH CARE EDUCATION/TRAINING PROGRAM

## 2025-07-15 PROCEDURE — 3288F FALL RISK ASSESSMENT DOCD: CPT | Mod: CPTII,HCNC,S$GLB, | Performed by: STUDENT IN AN ORGANIZED HEALTH CARE EDUCATION/TRAINING PROGRAM

## 2025-07-15 PROCEDURE — 3044F HG A1C LEVEL LT 7.0%: CPT | Mod: CPTII,HCNC,S$GLB, | Performed by: STUDENT IN AN ORGANIZED HEALTH CARE EDUCATION/TRAINING PROGRAM

## 2025-07-15 PROCEDURE — 3008F BODY MASS INDEX DOCD: CPT | Mod: CPTII,HCNC,S$GLB, | Performed by: STUDENT IN AN ORGANIZED HEALTH CARE EDUCATION/TRAINING PROGRAM

## 2025-07-15 PROCEDURE — 3075F SYST BP GE 130 - 139MM HG: CPT | Mod: CPTII,HCNC,S$GLB, | Performed by: STUDENT IN AN ORGANIZED HEALTH CARE EDUCATION/TRAINING PROGRAM

## 2025-07-15 PROCEDURE — 94060 EVALUATION OF WHEEZING: CPT | Mod: HCNC,S$GLB,, | Performed by: INTERNAL MEDICINE

## 2025-07-15 PROCEDURE — 1125F AMNT PAIN NOTED PAIN PRSNT: CPT | Mod: CPTII,HCNC,S$GLB, | Performed by: STUDENT IN AN ORGANIZED HEALTH CARE EDUCATION/TRAINING PROGRAM

## 2025-07-15 PROCEDURE — 3066F NEPHROPATHY DOC TX: CPT | Mod: CPTII,HCNC,S$GLB, | Performed by: STUDENT IN AN ORGANIZED HEALTH CARE EDUCATION/TRAINING PROGRAM

## 2025-07-15 PROCEDURE — 1159F MED LIST DOCD IN RCRD: CPT | Mod: CPTII,HCNC,S$GLB, | Performed by: STUDENT IN AN ORGANIZED HEALTH CARE EDUCATION/TRAINING PROGRAM

## 2025-07-15 PROCEDURE — 1101F PT FALLS ASSESS-DOCD LE1/YR: CPT | Mod: CPTII,HCNC,S$GLB, | Performed by: STUDENT IN AN ORGANIZED HEALTH CARE EDUCATION/TRAINING PROGRAM

## 2025-07-15 PROCEDURE — 99204 OFFICE O/P NEW MOD 45 MIN: CPT | Mod: HCNC,25,S$GLB, | Performed by: STUDENT IN AN ORGANIZED HEALTH CARE EDUCATION/TRAINING PROGRAM

## 2025-07-15 PROCEDURE — 94729 DIFFUSING CAPACITY: CPT | Mod: HCNC,S$GLB,, | Performed by: INTERNAL MEDICINE

## 2025-07-15 PROCEDURE — 4010F ACE/ARB THERAPY RXD/TAKEN: CPT | Mod: CPTII,HCNC,S$GLB, | Performed by: STUDENT IN AN ORGANIZED HEALTH CARE EDUCATION/TRAINING PROGRAM

## 2025-07-15 RX ORDER — BUDESONIDE AND FORMOTEROL FUMARATE DIHYDRATE 160; 4.5 UG/1; UG/1
2 AEROSOL RESPIRATORY (INHALATION) EVERY 12 HOURS
Qty: 30.6 G | Refills: 11 | Status: SHIPPED | OUTPATIENT
Start: 2025-07-15 | End: 2026-07-15

## 2025-07-15 RX ORDER — IPRATROPIUM BROMIDE AND ALBUTEROL SULFATE 2.5; .5 MG/3ML; MG/3ML
3 SOLUTION RESPIRATORY (INHALATION) EVERY 6 HOURS PRN
Qty: 180 ML | Status: SHIPPED | OUTPATIENT
Start: 2025-07-15 | End: 2026-07-15

## 2025-07-15 RX ORDER — BUDESONIDE AND FORMOTEROL FUMARATE DIHYDRATE 160; 4.5 UG/1; UG/1
2 AEROSOL RESPIRATORY (INHALATION) EVERY 12 HOURS
Qty: 10.2 G | Refills: 11 | Status: SHIPPED | OUTPATIENT
Start: 2025-07-15 | End: 2025-07-15

## 2025-07-15 NOTE — PROGRESS NOTES
"Subjective:     Reason for visit:  Wheezing    Patient ID:  Cecile Bowen is a 75 y.o. female with obesity, HTN hyperlipidemia, hypothyroidism, T2 dm, fibromyalgia, insomnia, depression, migraine, chronic pain, VIJAYA    History of ESBL and MDRO  ESRD on HD (MWF)    Interval History:  Recent hospitalization from 06/09/2025 to 06/12/2025 for UTI and shortness of breath attributed to volume overload after having run out of her Lasix.  7 other hospital encounters since Dec 2024 for various reasons.   Reports being in a wheelchair for the past 2 years after being hospitalized for 4 months.  Says that she did not complete physical therapy during that time and became gravely disabled.  Since that time has done physical therapy twice with very little improvement.  She is relying upon her sister for all ADLs.  Uses a Ray lift and straps at home.  Wheezing for the past 3 weeks.  Says it occurs intermittently in mainly around allergies.  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  Uses ray lift and sling. No carpet and no feather bedding.  Weather changes are trigger  Tobacco/Smoking:  never     Objective:     Vitals:    07/15/25 1313   BP: 130/62   BP Location: Right arm   Patient Position: Sitting   Pulse: 80   SpO2: (!) 94%   Weight: 99.8 kg (220 lb)   Height: 5' 8" (1.727 m)         Physical Exam  Vitals and nursing note reviewed.   Constitutional:       General: She is not in acute distress.     Appearance: She is obese. She is ill-appearing (chronically). She is not toxic-appearing or diaphoretic.      Comments: Sitting in a wheelchair  Significant kyphosis   HENT:      Head: Normocephalic and atraumatic.      Nose: No rhinorrhea.      Mouth/Throat:      Mouth: Mucous membranes are moist.   Eyes:      General: No scleral icterus.     Extraocular " Movements: Extraocular movements intact.   Cardiovascular:      Rate and Rhythm: Normal rate and regular rhythm.   Pulmonary:      Effort: Pulmonary effort is normal. No tachypnea, accessory muscle usage, respiratory distress or retractions.   Abdominal:      General: There is no distension.   Musculoskeletal:      Right lower leg: Edema present.      Left lower leg: Edema present.   Skin:     General: Skin is warm and dry.      Coloration: Skin is not jaundiced.      Findings: No rash.   Neurological:      General: No focal deficit present.      Mental Status: Mental status is at baseline.          Personal Diagnostic Review and Interpretation  06/10/2025 CT chest:  Stable subcentimeter nodules, largest 6 mm in LLL.  Mosaicism.  Patchy GGOs bilaterally    05/22/2021 CT chest:  Few subcentimeter nodules bilaterally, largest 8 mm in RML.  Mosaic attenuation with bronchial wall thickening      Pertinent Studies Reviewed & Interpreted:     Pulmonary Function Tests:   07/15/2025:  FEV1 35, FVC 37, FEV1/FVC 74, FEF 62.  Unable to do lung volumes.  DLCO 47    _____________________________________________________________________________    6 Minute Walk Tests:   None    _____________________________________________________________________________    Echocardiograms:   01/02/2025: EF 60-65% with G2 DD, JOHN mod 42; moderate aortic sclerosis; PASP 26    _____________________________________________________________________________    Other Pertinent Laboratories:  06/09/2025 [facility] 740    06/09/2025 .  05/17/2025 .  08/17/2023     2024 autoimmune workup:   - JASON positive 1:320, homogeneous  - anti double-stranded DNA positive  - reflex panel otherwise negative  - C3, C4 negative  - RF, CCP negative    2023 autoimmune workup:   - JASON positive 1:640, homogeneous.  Reflex panel negative  - RF negative      Assessment & Plan:       Problem List Items Addressed This Visit          Pulmonary    Pulmonary nodule     Overview   2025 CT chest with subcentimeter nodules, largest 6 mm in the LLL stable since 05/22/2021 CT chest         Current Assessment & Plan   - stability of nodules over the past 4 years supports a benign diagnosis         Shortness of breath - Primary    Overview   Recently hospitalized for decompensated heart failure.  .  Echo with G2 DD, JOHN mod 42 and PASP 26.  CT chest with GGOs and mosaicism  PFTs with severe reduction in FEV1 and FVC in parallel but unable to complete lung volumes; moderately reduced mid flows and severely reduced DLCO         Current Assessment & Plan   Multifactorial issue including obesity, severe deconditioning, chronic diastolic heart failure in the setting of ESRD on HD and recent hospitalization for decompensated heart failure.    - continue Lasix 80 mg daily.  Reports minimal UOP.  Needs ongoing volume removal with RRT  - given mosaicism and history of teen asthma, dedicated CT chest with ILD protocol ordered but will need to allow 8 weeks following recent hospitalization  - trial of LABA ICS and nebulizer treatments for mosaicism, peripheral eosinophilia of 740 and moderately reduced mid flows         Relevant Medications    albuterol-ipratropium (DUO-NEB) 2.5 mg-0.5 mg/3 mL nebulizer solution    budesonide-formoterol 160-4.5 mcg (SYMBICORT) 160-4.5 mcg/actuation HFAA    Other Relevant Orders    NEBULIZER KIT (SUPPLIES) FOR HOME USE    NEBULIZER FOR HOME USE    CT Chest Without Contrast    RESOLVED: COPD (chronic obstructive pulmonary disease)    Overview   Never smoker.  No emphysema on CT.  Patient does not have COPD            Other    VIJAYA (obstructive sleep apnea) (Chronic)    Overview   2013 polysomnogram with AHI 10.8 and SpO2 uli of 87%.  Reportedly intolerant of CPAP             RETURN TO CLINIC IN 3 MONTHS       Portions of the record may have been created with voice-recognition software. Occasional wrong-word or sound-a-like substitutions may have occurred  due to the inherent limitations of voice-recognition software. Read the chart carefully and recognize, using context, where substitutions have occurred.

## 2025-07-15 NOTE — ASSESSMENT & PLAN NOTE
Multifactorial issue including obesity, severe deconditioning, chronic diastolic heart failure in the setting of ESRD on HD and recent hospitalization for decompensated heart failure.    - continue Lasix 80 mg daily.  Reports minimal UOP.  Needs ongoing volume removal with RRT  - given mosaicism and history of teen asthma, dedicated CT chest with ILD protocol ordered but will need to allow 8 weeks following recent hospitalization  - trial of LABA ICS and nebulizer treatments for mosaicism, peripheral eosinophilia of 740 and moderately reduced mid flows

## 2025-07-17 ENCOUNTER — HOSPITAL ENCOUNTER (OUTPATIENT)
Dept: RADIOLOGY | Facility: HOSPITAL | Age: 75
Discharge: HOME | End: 2025-07-17
Attending: INTERNAL MEDICINE
Payer: MEDICARE

## 2025-07-17 DIAGNOSIS — Z12.31 ENCOUNTER FOR SCREENING MAMMOGRAM FOR BREAST CANCER: ICD-10-CM

## 2025-07-17 PROCEDURE — 77063 BREAST TOMOSYNTHESIS BI: CPT | Mod: TC,52,HCNC

## 2025-07-24 ENCOUNTER — LAB REQUISITION (OUTPATIENT)
Dept: LAB | Facility: HOSPITAL | Age: 75
End: 2025-07-24
Payer: MEDICARE

## 2025-07-24 DIAGNOSIS — E11.22 TYPE 2 DIABETES MELLITUS WITH DIABETIC CHRONIC KIDNEY DISEASE: ICD-10-CM

## 2025-07-24 LAB
ABSOLUTE EOSINOPHIL (OHS): 0.29 K/UL
ABSOLUTE MONOCYTE (OHS): 0.57 K/UL (ref 0.3–1)
ABSOLUTE NEUTROPHIL COUNT (OHS): 4.03 K/UL (ref 1.8–7.7)
ALBUMIN SERPL BCP-MCNC: 2.7 G/DL (ref 3.5–5.2)
ALP SERPL-CCNC: 118 UNIT/L (ref 40–150)
ALT SERPL W/O P-5'-P-CCNC: <8 UNIT/L (ref 10–44)
ANION GAP (OHS): 7 MMOL/L (ref 8–16)
AST SERPL-CCNC: 22 UNIT/L (ref 11–45)
BASOPHILS # BLD AUTO: 0.04 K/UL
BASOPHILS NFR BLD AUTO: 0.6 %
BILIRUB SERPL-MCNC: 0.2 MG/DL (ref 0.1–1)
BUN SERPL-MCNC: 16 MG/DL (ref 8–23)
CALCIUM SERPL-MCNC: 8 MG/DL (ref 8.7–10.5)
CHLORIDE SERPL-SCNC: 102 MMOL/L (ref 95–110)
CO2 SERPL-SCNC: 25 MMOL/L (ref 23–29)
CREAT SERPL-MCNC: 2.5 MG/DL (ref 0.5–1.4)
ERYTHROCYTE [DISTWIDTH] IN BLOOD BY AUTOMATED COUNT: 15.9 % (ref 11.5–14.5)
GFR SERPLBLD CREATININE-BSD FMLA CKD-EPI: 20 ML/MIN/1.73/M2
GLUCOSE SERPL-MCNC: 180 MG/DL (ref 70–110)
HCT VFR BLD AUTO: 34.9 % (ref 37–48.5)
HGB BLD-MCNC: 11 GM/DL (ref 12–16)
IMM GRANULOCYTES # BLD AUTO: 0.01 K/UL (ref 0–0.04)
IMM GRANULOCYTES NFR BLD AUTO: 0.2 % (ref 0–0.5)
LYMPHOCYTES # BLD AUTO: 1.34 K/UL (ref 1–4.8)
MCH RBC QN AUTO: 30.1 PG (ref 27–31)
MCHC RBC AUTO-ENTMCNC: 31.5 G/DL (ref 32–36)
MCV RBC AUTO: 96 FL (ref 82–98)
NUCLEATED RBC (/100WBC) (OHS): 0 /100 WBC
PLATELET # BLD AUTO: 260 K/UL (ref 150–450)
PMV BLD AUTO: 9.3 FL (ref 9.2–12.9)
POTASSIUM SERPL-SCNC: 3.7 MMOL/L (ref 3.5–5.1)
PROT SERPL-MCNC: 6 GM/DL (ref 6–8.4)
RBC # BLD AUTO: 3.65 M/UL (ref 4–5.4)
RELATIVE EOSINOPHIL (OHS): 4.6 %
RELATIVE LYMPHOCYTE (OHS): 21.3 % (ref 18–48)
RELATIVE MONOCYTE (OHS): 9.1 % (ref 4–15)
RELATIVE NEUTROPHIL (OHS): 64.2 % (ref 38–73)
SODIUM SERPL-SCNC: 134 MMOL/L (ref 136–145)
WBC # BLD AUTO: 6.28 K/UL (ref 3.9–12.7)

## 2025-07-24 PROCEDURE — 85025 COMPLETE CBC W/AUTO DIFF WBC: CPT | Mod: HCNC | Performed by: PHYSICIAN ASSISTANT

## 2025-07-24 PROCEDURE — 84075 ASSAY ALKALINE PHOSPHATASE: CPT | Mod: HCNC | Performed by: PHYSICIAN ASSISTANT

## 2025-07-25 ENCOUNTER — RESULTS FOLLOW-UP (OUTPATIENT)
Dept: INTERNAL MEDICINE | Facility: CLINIC | Age: 75
End: 2025-07-25
Payer: MEDICARE

## 2025-07-29 ENCOUNTER — DOCUMENT SCAN (OUTPATIENT)
Dept: HOME HEALTH SERVICES | Facility: HOSPITAL | Age: 75
End: 2025-07-29
Payer: MEDICARE

## 2025-07-31 DIAGNOSIS — M79.7 FIBROMYALGIA: ICD-10-CM

## 2025-07-31 DIAGNOSIS — G89.29 CHRONIC NECK PAIN: ICD-10-CM

## 2025-07-31 DIAGNOSIS — M54.2 CHRONIC NECK PAIN: ICD-10-CM

## 2025-07-31 RX ORDER — METHOCARBAMOL 750 MG/1
750-1500 TABLET, FILM COATED ORAL 3 TIMES DAILY PRN
Qty: 180 TABLET | Refills: 3 | Status: SHIPPED | OUTPATIENT
Start: 2025-07-31

## 2025-07-31 RX ORDER — OXYCODONE HYDROCHLORIDE 10 MG/1
10 TABLET ORAL DAILY PRN
Qty: 30 TABLET | Refills: 0 | Status: SHIPPED | OUTPATIENT
Start: 2025-08-06

## 2025-07-31 RX ORDER — HYDROCODONE BITARTRATE AND ACETAMINOPHEN 10; 325 MG/1; MG/1
1 TABLET ORAL EVERY 6 HOURS PRN
Qty: 120 TABLET | Refills: 0 | Status: SHIPPED | OUTPATIENT
Start: 2025-08-06 | End: 2025-09-05

## 2025-07-31 NOTE — TELEPHONE ENCOUNTER
Copied from CRM #[id number]. Topic: Medications - Medication Refill  >> Jul 31, 2025  3:21 PM Rae wrote:  Type:  RX Refill Request    Who Called: Cecile Bowen    Refill or New Rx:refill  RX Name and Strength:HYDROcodone-acetaminophen (NORCO)  mg per tablet    methocarbamoL (ROBAXIN) 750 MG Tab    oxyCODONE (ROXICODONE) 10 mg Tab immediate release tablet      Preferred Pharmacy with phone number:  [id number] - [place], [place] - [facility] [place] [facility] [place] [facility]  [address] [place] [address]  [place] [address]  Phone: [phone] Fax: [phone]        Local or Mail Order:local  Ordering Provider:Rivera  Would the patient rather a call back or a response via [facility]? Call back  Best Call Back Number: Contact Information  [phone] (Mobile)       Additional Information: Pt has only a couple of dosages left.

## 2025-08-10 RX ORDER — ERGOCALCIFEROL 1.25 MG/1
CAPSULE ORAL
Qty: 12 CAPSULE | Refills: 3 | Status: SHIPPED | OUTPATIENT
Start: 2025-08-10

## 2025-08-12 RX ORDER — TRAZODONE HYDROCHLORIDE 100 MG/1
100 TABLET ORAL NIGHTLY
Qty: 90 TABLET | Refills: 3 | Status: SHIPPED | OUTPATIENT
Start: 2025-08-12

## 2025-08-18 ENCOUNTER — TELEPHONE (OUTPATIENT)
Dept: VASCULAR SURGERY | Facility: CLINIC | Age: 75
End: 2025-08-18
Payer: MEDICARE

## 2025-08-19 ENCOUNTER — TELEPHONE (OUTPATIENT)
Dept: INTERNAL MEDICINE | Facility: CLINIC | Age: 75
End: 2025-08-19
Payer: MEDICARE

## 2025-08-21 RX ORDER — DULOXETIN HYDROCHLORIDE 20 MG/1
20 CAPSULE, DELAYED RELEASE ORAL 2 TIMES DAILY
Qty: 180 CAPSULE | Refills: 1 | Status: SHIPPED | OUTPATIENT
Start: 2025-08-21

## 2025-08-26 ENCOUNTER — HOSPITAL ENCOUNTER (OUTPATIENT)
Dept: VASCULAR SURGERY | Facility: CLINIC | Age: 75
Discharge: HOME | End: 2025-08-26
Attending: SURGERY
Payer: MEDICARE

## 2025-08-26 ENCOUNTER — OFFICE VISIT (OUTPATIENT)
Dept: VASCULAR SURGERY | Facility: CLINIC | Age: 75
End: 2025-08-26
Attending: SURGERY
Payer: MEDICARE

## 2025-08-26 VITALS — SYSTOLIC BLOOD PRESSURE: 156 MMHG | HEART RATE: 81 BPM | DIASTOLIC BLOOD PRESSURE: 74 MMHG | TEMPERATURE: 98 F

## 2025-08-26 DIAGNOSIS — N18.6 ESRD (END STAGE RENAL DISEASE) ON DIALYSIS: Primary | ICD-10-CM

## 2025-08-26 DIAGNOSIS — T82.898D STEAL SYNDROME AS COMPLICATION OF DIALYSIS ACCESS, SUBSEQUENT ENCOUNTER: ICD-10-CM

## 2025-08-26 DIAGNOSIS — N18.6 ESRD (END STAGE RENAL DISEASE) ON DIALYSIS: ICD-10-CM

## 2025-08-26 DIAGNOSIS — Z99.2 ESRD (END STAGE RENAL DISEASE) ON DIALYSIS: ICD-10-CM

## 2025-08-26 DIAGNOSIS — Z99.2 ESRD (END STAGE RENAL DISEASE) ON DIALYSIS: Primary | ICD-10-CM

## 2025-08-26 PROCEDURE — 3066F NEPHROPATHY DOC TX: CPT | Mod: CPTII,HCNC,S$GLB, | Performed by: SURGERY

## 2025-08-26 PROCEDURE — 4010F ACE/ARB THERAPY RXD/TAKEN: CPT | Mod: CPTII,HCNC,S$GLB, | Performed by: SURGERY

## 2025-08-26 PROCEDURE — 3077F SYST BP >= 140 MM HG: CPT | Mod: CPTII,HCNC,S$GLB, | Performed by: SURGERY

## 2025-08-26 PROCEDURE — 1160F RVW MEDS BY RX/DR IN RCRD: CPT | Mod: CPTII,HCNC,S$GLB, | Performed by: SURGERY

## 2025-08-26 PROCEDURE — 3044F HG A1C LEVEL LT 7.0%: CPT | Mod: CPTII,HCNC,S$GLB, | Performed by: SURGERY

## 2025-08-26 PROCEDURE — 1101F PT FALLS ASSESS-DOCD LE1/YR: CPT | Mod: CPTII,HCNC,S$GLB, | Performed by: SURGERY

## 2025-08-26 PROCEDURE — 93990 DOPPLER FLOW TESTING: CPT | Mod: HCNC,S$GLB,, | Performed by: SURGERY

## 2025-08-26 PROCEDURE — 99999 PR PBB SHADOW E&M-EST. PATIENT-LVL II: CPT | Mod: PBBFAC,HCNC,, | Performed by: SURGERY

## 2025-08-26 PROCEDURE — 1126F AMNT PAIN NOTED NONE PRSNT: CPT | Mod: CPTII,HCNC,S$GLB, | Performed by: SURGERY

## 2025-08-26 PROCEDURE — 3072F LOW RISK FOR RETINOPATHY: CPT | Mod: CPTII,HCNC,S$GLB, | Performed by: SURGERY

## 2025-08-26 PROCEDURE — 1159F MED LIST DOCD IN RCRD: CPT | Mod: CPTII,HCNC,S$GLB, | Performed by: SURGERY

## 2025-08-26 PROCEDURE — 3288F FALL RISK ASSESSMENT DOCD: CPT | Mod: CPTII,HCNC,S$GLB, | Performed by: SURGERY

## 2025-08-26 PROCEDURE — 99214 OFFICE O/P EST MOD 30 MIN: CPT | Mod: HCNC,S$GLB,, | Performed by: SURGERY

## 2025-08-26 PROCEDURE — 3078F DIAST BP <80 MM HG: CPT | Mod: CPTII,HCNC,S$GLB, | Performed by: SURGERY

## 2025-08-27 ENCOUNTER — EXTERNAL HOME HEALTH (OUTPATIENT)
Dept: HOME HEALTH SERVICES | Facility: HOSPITAL | Age: 75
End: 2025-08-27
Payer: MEDICARE

## 2025-08-27 PROCEDURE — 87086 URINE CULTURE/COLONY COUNT: CPT | Mod: HCNC | Performed by: NURSE PRACTITIONER

## 2025-08-27 PROCEDURE — 81003 URINALYSIS AUTO W/O SCOPE: CPT | Mod: HCNC | Performed by: NURSE PRACTITIONER

## 2025-08-28 ENCOUNTER — DOCUMENT SCAN (OUTPATIENT)
Dept: HOME HEALTH SERVICES | Facility: HOSPITAL | Age: 75
End: 2025-08-28
Payer: MEDICARE

## 2025-08-28 ENCOUNTER — TELEPHONE (OUTPATIENT)
Dept: UROLOGY | Facility: CLINIC | Age: 75
End: 2025-08-28
Payer: MEDICARE

## 2025-08-28 ENCOUNTER — LAB REQUISITION (OUTPATIENT)
Dept: LAB | Facility: HOSPITAL | Age: 75
End: 2025-08-28
Payer: MEDICARE

## 2025-08-28 DIAGNOSIS — N31.9 NEUROMUSCULAR DYSFUNCTION OF BLADDER, UNSPECIFIED: ICD-10-CM

## 2025-08-28 LAB
BACTERIA #/AREA URNS AUTO: ABNORMAL /HPF
BILIRUB UR QL STRIP.AUTO: NEGATIVE
CLARITY UR: ABNORMAL
COLOR UR AUTO: ABNORMAL
GLUCOSE UR QL STRIP: NEGATIVE
HGB UR QL STRIP: ABNORMAL
HYALINE CASTS UR QL AUTO: 0 /LPF (ref 0–1)
KETONES UR QL STRIP: NEGATIVE
LEUKOCYTE ESTERASE UR QL STRIP: ABNORMAL
MICROSCOPIC COMMENT: ABNORMAL
NITRITE UR QL STRIP: NEGATIVE
PH UR STRIP: 8 [PH]
PROT UR QL STRIP: ABNORMAL
RBC #/AREA URNS AUTO: >100 /HPF (ref 0–4)
SP GR UR STRIP: 1.01
SQUAMOUS #/AREA URNS AUTO: 0 /HPF
UROBILINOGEN UR STRIP-ACNC: NEGATIVE EU/DL
WBC #/AREA URNS AUTO: >100 /HPF (ref 0–5)
WBC CLUMPS UR QL AUTO: ABNORMAL
YEAST UR QL AUTO: ABNORMAL /HPF

## 2025-08-28 PROCEDURE — 87088 URINE BACTERIA CULTURE: CPT | Mod: HCNC | Performed by: UROLOGY

## 2025-08-28 PROCEDURE — 81003 URINALYSIS AUTO W/O SCOPE: CPT | Mod: HCNC | Performed by: UROLOGY

## 2025-08-29 ENCOUNTER — TELEPHONE (OUTPATIENT)
Dept: INTERNAL MEDICINE | Facility: CLINIC | Age: 75
End: 2025-08-29
Payer: MEDICARE

## 2025-08-29 LAB — HOLD SPECIMEN: NORMAL

## 2025-09-02 ENCOUNTER — TELEPHONE (OUTPATIENT)
Dept: UROLOGY | Facility: CLINIC | Age: 75
End: 2025-09-02
Payer: MEDICARE

## 2025-09-02 LAB
BACTERIA UR CULT: ABNORMAL
BACTERIA UR CULT: ABNORMAL

## 2025-09-02 RX ORDER — CEFPODOXIME PROXETIL 100 MG/1
100 TABLET, FILM COATED ORAL 2 TIMES DAILY
Qty: 14 TABLET | Refills: 0 | Status: SHIPPED | OUTPATIENT
Start: 2025-09-02 | End: 2025-09-09

## 2025-09-04 DIAGNOSIS — R82.71 BACTERIA IN URINE: Primary | ICD-10-CM

## 2025-09-04 RX ORDER — DOXYCYCLINE 100 MG/1
100 CAPSULE ORAL EVERY 12 HOURS
Qty: 20 CAPSULE | Refills: 0 | Status: SHIPPED | OUTPATIENT
Start: 2025-09-04 | End: 2025-09-14

## (undated) DEVICE — APPLICATOR CHLORAPREP CLR 10.5

## (undated) DEVICE — SEE MEDLINE ITEM 157187

## (undated) DEVICE — PACK CYSTO

## (undated) DEVICE — SUT 3-0 12-18IN SILK

## (undated) DEVICE — SUT VICRYL 3-0 27 SH

## (undated) DEVICE — GUIDE WIRE MOTION .035 X 150CM

## (undated) DEVICE — SUT VICRYL PLUS 4-0 P3 18IN

## (undated) DEVICE — BAG URINARY DRAINAGE 2000ML

## (undated) DEVICE — Device

## (undated) DEVICE — GOWN SMARTGOWN LVL4 X-LONG XL

## (undated) DEVICE — SYR MARK 7 ARTERION 150ML

## (undated) DEVICE — DRAPE PED LAP SURG 108X77IN

## (undated) DEVICE — TRAY CYSTO BASIN

## (undated) DEVICE — OMNIPAQUE CONTRAST 350MG/100ML

## (undated) DEVICE — SEE MEDLINE ITEM 146417

## (undated) DEVICE — SYR 10CC LUER LOCK

## (undated) DEVICE — GLIDESHEATH SLENDER SS 5FR10CM

## (undated) DEVICE — DRESSING LEUKOPLAST FLEX 1X3IN

## (undated) DEVICE — SUT SILK 3-0 SH 18IN BLACK

## (undated) DEVICE — BAG LINGEMAN DRAIN UROLOGY

## (undated) DEVICE — CUP MEDICINE STERILE 2OZ

## (undated) DEVICE — UNDERGLOVES BIOGEL PI SZ 7 LF

## (undated) DEVICE — SUT ETHILON 3-0 PS2 18 BLK

## (undated) DEVICE — ELECTRODE REM PLYHSV RETURN 9

## (undated) DEVICE — SET IRR URLGY 2LINE UNIV SPIKE

## (undated) DEVICE — COVERS PROBE NR-48 STERILE

## (undated) DEVICE — NDL 18GA X1 1/2 REG BEVEL

## (undated) DEVICE — SUT 2-0 VICRYL / SH (J417)

## (undated) DEVICE — SPONGE LAP 18X18 PREWASHED

## (undated) DEVICE — BAND TR WITH INFLATOR

## (undated) DEVICE — COVER PROBE US 5.5X58L NON LTX

## (undated) DEVICE — SEE MEDLINE ITEM 157128

## (undated) DEVICE — COVER INSTR ELASTIC BAND 40X20

## (undated) DEVICE — UNDERGLOVES BIOGEL PI SIZE 7.5

## (undated) DEVICE — GUIDEWIRE STF .035X260CM ANG

## (undated) DEVICE — NDL SPINAL 22GA X 3 1/2 IN

## (undated) DEVICE — ADHESIVE DERMABOND ADVANCED

## (undated) DEVICE — SET CYSTO IRRIGATION UNIV SPIK

## (undated) DEVICE — PACK AV VASCULAR ACCESS OMC

## (undated) DEVICE — SET CYSTO IRRIGATING

## (undated) DEVICE — STOCKINET 4INX48

## (undated) DEVICE — BLADE 4IN EDGE INSULATED

## (undated) DEVICE — NDL WILLIAMS CYSTOSCOPIC

## (undated) DEVICE — TRAY CATH LAB OMC

## (undated) DEVICE — GUIDEWIRE STR TIP HIWIRE 150CM

## (undated) DEVICE — TRAY MINOR GEN SURG

## (undated) DEVICE — NDL SPINAL 26G X 3 1/2 IN

## (undated) DEVICE — GAUZE WOVEN STRL 12-PLY 4X4IN

## (undated) DEVICE — KIT INTRO MICRO NIT VSI 4FR

## (undated) DEVICE — SOL IRR WATER STRL 3000 ML

## (undated) DEVICE — SOL 9P NACL IRR PIC IL

## (undated) DEVICE — CATH POLLACK OPEN-END FLEXI-TI

## (undated) DEVICE — CONNECTOR DBL. MALE LUER LOCK

## (undated) DEVICE — INFLATOR ENCORE 26 BLLN INFL

## (undated) DEVICE — SOL NS 1000CC

## (undated) DEVICE — CATH ANGLED GLIDE CATH 5FR

## (undated) DEVICE — GUIDEWIRE NITINOL

## (undated) DEVICE — SUT CTD VICRYL 4-0 BR PS-2

## (undated) DEVICE — SUT PROLENE 6-0 24 BV-1

## (undated) DEVICE — EVACUATOR WOUND BULB 100CC

## (undated) DEVICE — CONTRAST VISIPAQUE 150ML

## (undated) DEVICE — SHEATH FLEXOR URET 10.7FRX35CM

## (undated) DEVICE — DRAPE THYROID WITH ARMBOARD

## (undated) DEVICE — SUT SILK 2-0 SH 18IN BLACK

## (undated) DEVICE — DRESSING XEROFORM FOIL PK 1X8

## (undated) DEVICE — NDL 25GA 5FR 35MM

## (undated) DEVICE — SUT MCRYL PLUS 4-0 PS2 27IN

## (undated) DEVICE — VALVE ENDOSCOPIC(SURESEAL II)

## (undated) DEVICE — BANDAGE ADHESIVE

## (undated) DEVICE — KIT MICROINTRO 4F .018X40X7CM

## (undated) DEVICE — LABEL GU BAG

## (undated) DEVICE — DRESSING TRANS 4X4 TEGADERM

## (undated) DEVICE — GAUZE FLUFF XXLG 36X36 2 PLY

## (undated) DEVICE — SUT ETHILON 2-0 PSLX 30IN

## (undated) DEVICE — GLOVE BIOGEL 7.5

## (undated) DEVICE — SYR MED RAD 150ML

## (undated) DEVICE — SOL IRR NACL .9% 3000ML

## (undated) DEVICE — DRAPE STERI INSTRUMENT 1018

## (undated) DEVICE — ADAPTER HOSE 10FT 8MM

## (undated) DEVICE — PACK UNIVERSAL SPLIT II

## (undated) DEVICE — SUT LIGACLIP SMALL XTRA

## (undated) DEVICE — SEE MEDLINE ITEM 152572

## (undated) DEVICE — GOWN X-LG STERILE BACK

## (undated) DEVICE — LOOP VESSEL BLUE MAXI

## (undated) DEVICE — EXTRACTOR TIPLESS 2.4FRX1115CM

## (undated) DEVICE — SOL NACL IRR 3000ML

## (undated) DEVICE — UNDERGLOVES BIOGEL PI SZ 6 LF

## (undated) DEVICE — GOWN SMART IMP BREATHABLE XXLG

## (undated) DEVICE — DRAIN CHANNEL ROUND 19FR

## (undated) DEVICE — DRESSING TEGADERM 2 3/8 X 2.75

## (undated) DEVICE — FIBER MOSES 200 DFL

## (undated) DEVICE — CATH ULTRAVERSE 035 5X20X75

## (undated) DEVICE — DECANTER FLUID TRNSF WHITE 9IN

## (undated) DEVICE — SOL SOD CHLORIDE 0.9% 10ML

## (undated) DEVICE — SYS LABEL CORRECT MED

## (undated) DEVICE — WIRE GUIDE 0.038OLD

## (undated) DEVICE — CLIP MED TICALL

## (undated) DEVICE — NEOGUARD COVER 4X30CM STERILE

## (undated) DEVICE — SUT PERCLOSE PROSTYLE MEDIATE

## (undated) DEVICE — GAUZE SPONGE 4X4 12PLY

## (undated) DEVICE — SOL WATER STRL IRR 1000ML

## (undated) DEVICE — PLUG CATHETER STERILE FOLEY

## (undated) DEVICE — LOOP VESSEL BLUE MINI 2/CARD

## (undated) DEVICE — INTRODUCER VASC RADPQ 5FRX10CM

## (undated) DEVICE — SHEATH FLEXOR RAABE 6FRX90CM

## (undated) DEVICE — DRAPE U SPLIT SHEET 54X76IN

## (undated) DEVICE — SEE MEDLINE ITEM 152622

## (undated) DEVICE — ELECTRODE BLADE INSULATED 1 IN

## (undated) DEVICE — SUT CTD VICRYL 3-0 CR/SH

## (undated) DEVICE — GUIDEWIRE STF .035X180CM ANG

## (undated) DEVICE — SYR DISP LL 5CC

## (undated) DEVICE — SUT MONOCRYL 3-0 SH U/D